# Patient Record
Sex: MALE | Race: WHITE | NOT HISPANIC OR LATINO | Employment: OTHER | URBAN - METROPOLITAN AREA
[De-identification: names, ages, dates, MRNs, and addresses within clinical notes are randomized per-mention and may not be internally consistent; named-entity substitution may affect disease eponyms.]

---

## 2017-01-21 ENCOUNTER — APPOINTMENT (OUTPATIENT)
Dept: CT IMAGING | Facility: CLINIC | Age: 57
DRG: 871 | End: 2017-01-21
Attending: EMERGENCY MEDICINE
Payer: MEDICARE

## 2017-01-21 ENCOUNTER — TRANSFERRED RECORDS (OUTPATIENT)
Dept: HEALTH INFORMATION MANAGEMENT | Facility: CLINIC | Age: 57
End: 2017-01-21

## 2017-01-21 ENCOUNTER — APPOINTMENT (OUTPATIENT)
Dept: GENERAL RADIOLOGY | Facility: CLINIC | Age: 57
DRG: 871 | End: 2017-01-21
Attending: EMERGENCY MEDICINE
Payer: MEDICARE

## 2017-01-21 ENCOUNTER — HOSPITAL ENCOUNTER (INPATIENT)
Facility: CLINIC | Age: 57
LOS: 2 days | Discharge: ANOTHER HOSPITAL | DRG: 871 | End: 2017-01-23
Attending: EMERGENCY MEDICINE | Admitting: INTERNAL MEDICINE
Payer: MEDICARE

## 2017-01-21 DIAGNOSIS — K59.00 CONSTIPATION, UNSPECIFIED CONSTIPATION TYPE: ICD-10-CM

## 2017-01-21 DIAGNOSIS — E03.9 HYPOTHYROIDISM, UNSPECIFIED TYPE: ICD-10-CM

## 2017-01-21 DIAGNOSIS — R45.1 AGITATION REQUIRING SEDATION PROTOCOL: ICD-10-CM

## 2017-01-21 DIAGNOSIS — J18.9 PNEUMONIA DUE TO INFECTIOUS ORGANISM, UNSPECIFIED LATERALITY, UNSPECIFIED PART OF LUNG: ICD-10-CM

## 2017-01-21 DIAGNOSIS — R65.21 SEPTIC SHOCK (H): ICD-10-CM

## 2017-01-21 DIAGNOSIS — K21.00 GASTROESOPHAGEAL REFLUX DISEASE WITH ESOPHAGITIS: ICD-10-CM

## 2017-01-21 DIAGNOSIS — G40.803 INTRACTABLE EPILEPSY DUE TO EXTERNAL CAUSES WITH STATUS EPILEPTICUS (H): Primary | ICD-10-CM

## 2017-01-21 DIAGNOSIS — A41.9 SEPTIC SHOCK (H): ICD-10-CM

## 2017-01-21 DIAGNOSIS — K85.90 ACUTE PANCREATITIS, UNSPECIFIED COMPLICATION STATUS, UNSPECIFIED PANCREATITIS TYPE: ICD-10-CM

## 2017-01-21 DIAGNOSIS — E23.0 PANHYPOPITUITARISM (H): ICD-10-CM

## 2017-01-21 LAB
ALBUMIN SERPL-MCNC: 2.1 G/DL (ref 3.4–5)
ALP SERPL-CCNC: 63 U/L (ref 40–150)
ALT SERPL W P-5'-P-CCNC: 23 U/L (ref 0–70)
ANION GAP SERPL CALCULATED.3IONS-SCNC: 15 MMOL/L (ref 3–14)
AST SERPL W P-5'-P-CCNC: 21 U/L (ref 0–45)
BILIRUB SERPL-MCNC: 1 MG/DL (ref 0.2–1.3)
BUN SERPL-MCNC: 24 MG/DL (ref 7–30)
CALCIUM SERPL-MCNC: 6.8 MG/DL (ref 8.5–10.1)
CHLORIDE SERPL-SCNC: 111 MMOL/L (ref 94–109)
CO2 BLDCOV-SCNC: 16 MMOL/L (ref 21–28)
CO2 SERPL-SCNC: 18 MMOL/L (ref 20–32)
CREAT SERPL-MCNC: 1.14 MG/DL (ref 0.66–1.25)
GFR SERPL CREATININE-BSD FRML MDRD: 67 ML/MIN/1.7M2
GLUCOSE BLDC GLUCOMTR-MCNC: 208 MG/DL (ref 70–99)
GLUCOSE BLDC GLUCOMTR-MCNC: 90 MG/DL (ref 70–99)
GLUCOSE SERPL-MCNC: 106 MG/DL (ref 70–99)
INTERPRETATION ECG - MUSE: NORMAL
LACTATE BLD-SCNC: 10 MMOL/L (ref 0.7–2.1)
LACTATE BLD-SCNC: 8.5 MMOL/L (ref 0.7–2.1)
LIPASE SERPL-CCNC: 789 U/L (ref 73–393)
PCO2 BLDV: 45 MM HG (ref 40–50)
PH BLDV: 7.17 PH (ref 7.32–7.43)
PLATELET # BLD AUTO: 224 10E9/L (ref 150–450)
PO2 BLDV: 28 MM HG (ref 25–47)
POTASSIUM SERPL-SCNC: 4.5 MMOL/L (ref 3.4–5.3)
PROT SERPL-MCNC: 6.1 G/DL (ref 6.8–8.8)
SAO2 % BLDV FROM PO2: 39 %
SODIUM SERPL-SCNC: 144 MMOL/L (ref 133–144)
TROPONIN I SERPL-MCNC: 0.03 UG/L (ref 0–0.04)

## 2017-01-21 PROCEDURE — 82330 ASSAY OF CALCIUM: CPT | Performed by: INTERNAL MEDICINE

## 2017-01-21 PROCEDURE — 80053 COMPREHEN METABOLIC PANEL: CPT | Performed by: EMERGENCY MEDICINE

## 2017-01-21 PROCEDURE — 74177 CT ABD & PELVIS W/CONTRAST: CPT

## 2017-01-21 PROCEDURE — 87640 STAPH A DNA AMP PROBE: CPT | Performed by: INTERNAL MEDICINE

## 2017-01-21 PROCEDURE — 87040 BLOOD CULTURE FOR BACTERIA: CPT | Performed by: EMERGENCY MEDICINE

## 2017-01-21 PROCEDURE — 82803 BLOOD GASES ANY COMBINATION: CPT

## 2017-01-21 PROCEDURE — 84484 ASSAY OF TROPONIN QUANT: CPT | Performed by: EMERGENCY MEDICINE

## 2017-01-21 PROCEDURE — 40000275 ZZH STATISTIC RCP TIME EA 10 MIN

## 2017-01-21 PROCEDURE — 36415 COLL VENOUS BLD VENIPUNCTURE: CPT

## 2017-01-21 PROCEDURE — 25500064 ZZH RX 255 OP 636: Performed by: EMERGENCY MEDICINE

## 2017-01-21 PROCEDURE — 25000132 ZZH RX MED GY IP 250 OP 250 PS 637: Mod: GY | Performed by: INTERNAL MEDICINE

## 2017-01-21 PROCEDURE — 25800025 ZZH RX 258: Performed by: INTERNAL MEDICINE

## 2017-01-21 PROCEDURE — 93010 ELECTROCARDIOGRAM REPORT: CPT | Performed by: INTERNAL MEDICINE

## 2017-01-21 PROCEDURE — 40000281 ZZH STATISTIC TRANSPORT TIME EA 15 MIN

## 2017-01-21 PROCEDURE — 3E043XZ INTRODUCTION OF VASOPRESSOR INTO CENTRAL VEIN, PERCUTANEOUS APPROACH: ICD-10-PCS | Performed by: INTERNAL MEDICINE

## 2017-01-21 PROCEDURE — A9270 NON-COVERED ITEM OR SERVICE: HCPCS | Mod: GY | Performed by: EMERGENCY MEDICINE

## 2017-01-21 PROCEDURE — 96365 THER/PROPH/DIAG IV INF INIT: CPT

## 2017-01-21 PROCEDURE — A9270 NON-COVERED ITEM OR SERVICE: HCPCS | Mod: GY | Performed by: INTERNAL MEDICINE

## 2017-01-21 PROCEDURE — 0F9G3ZX DRAINAGE OF PANCREAS, PERCUTANEOUS APPROACH, DIAGNOSTIC: ICD-10-PCS | Performed by: RADIOLOGY

## 2017-01-21 PROCEDURE — 82805 BLOOD GASES W/O2 SATURATION: CPT | Performed by: INTERNAL MEDICINE

## 2017-01-21 PROCEDURE — 25000125 ZZHC RX 250: Performed by: INTERNAL MEDICINE

## 2017-01-21 PROCEDURE — 85049 AUTOMATED PLATELET COUNT: CPT | Performed by: EMERGENCY MEDICINE

## 2017-01-21 PROCEDURE — 00000146 ZZHCL STATISTIC GLUCOSE BY METER IP

## 2017-01-21 PROCEDURE — 83605 ASSAY OF LACTIC ACID: CPT

## 2017-01-21 PROCEDURE — 93005 ELECTROCARDIOGRAM TRACING: CPT

## 2017-01-21 PROCEDURE — 5A1945Z RESPIRATORY VENTILATION, 24-96 CONSECUTIVE HOURS: ICD-10-PCS | Performed by: INTERNAL MEDICINE

## 2017-01-21 PROCEDURE — 25800025 ZZH RX 258: Performed by: EMERGENCY MEDICINE

## 2017-01-21 PROCEDURE — 71010 XR CHEST PORT 1 VW: CPT

## 2017-01-21 PROCEDURE — 96366 THER/PROPH/DIAG IV INF ADDON: CPT

## 2017-01-21 PROCEDURE — 36620 INSERTION CATHETER ARTERY: CPT | Performed by: INTERNAL MEDICINE

## 2017-01-21 PROCEDURE — 84484 ASSAY OF TROPONIN QUANT: CPT | Performed by: INTERNAL MEDICINE

## 2017-01-21 PROCEDURE — 83605 ASSAY OF LACTIC ACID: CPT | Performed by: INTERNAL MEDICINE

## 2017-01-21 PROCEDURE — 99291 CRITICAL CARE FIRST HOUR: CPT | Mod: 25 | Performed by: INTERNAL MEDICINE

## 2017-01-21 PROCEDURE — 20000003 ZZH R&B ICU

## 2017-01-21 PROCEDURE — 94002 VENT MGMT INPAT INIT DAY: CPT

## 2017-01-21 PROCEDURE — 25000132 ZZH RX MED GY IP 250 OP 250 PS 637: Mod: GY | Performed by: EMERGENCY MEDICINE

## 2017-01-21 PROCEDURE — 99285 EMERGENCY DEPT VISIT HI MDM: CPT | Mod: 25

## 2017-01-21 PROCEDURE — 87641 MR-STAPH DNA AMP PROBE: CPT | Performed by: INTERNAL MEDICINE

## 2017-01-21 PROCEDURE — 80048 BASIC METABOLIC PNL TOTAL CA: CPT | Performed by: INTERNAL MEDICINE

## 2017-01-21 PROCEDURE — 40000008 ZZH STATISTIC AIRWAY CARE

## 2017-01-21 PROCEDURE — 25000125 ZZHC RX 250: Performed by: EMERGENCY MEDICINE

## 2017-01-21 PROCEDURE — 83690 ASSAY OF LIPASE: CPT | Performed by: EMERGENCY MEDICINE

## 2017-01-21 RX ORDER — DESMOPRESSIN ACETATE 0.1 MG/1
100 TABLET ORAL DAILY
Status: DISCONTINUED | OUTPATIENT
Start: 2017-01-22 | End: 2017-01-23 | Stop reason: HOSPADM

## 2017-01-21 RX ORDER — AMINO ACIDS/PROTEIN HYDROLYS 15G-100/30
1 LIQUID (ML) ORAL 2 TIMES DAILY
Status: DISCONTINUED | OUTPATIENT
Start: 2017-01-21 | End: 2017-01-23 | Stop reason: HOSPADM

## 2017-01-21 RX ORDER — CHLORHEXIDINE GLUCONATE ORAL RINSE 1.2 MG/ML
15 SOLUTION DENTAL EVERY 12 HOURS
Status: DISCONTINUED | OUTPATIENT
Start: 2017-01-21 | End: 2017-01-23 | Stop reason: HOSPADM

## 2017-01-21 RX ORDER — IOPAMIDOL 755 MG/ML
60 INJECTION, SOLUTION INTRAVASCULAR ONCE
Status: COMPLETED | OUTPATIENT
Start: 2017-01-21 | End: 2017-01-21

## 2017-01-21 RX ORDER — BISACODYL 10 MG
10 SUPPOSITORY, RECTAL RECTAL DAILY
Status: DISCONTINUED | OUTPATIENT
Start: 2017-01-21 | End: 2017-01-23 | Stop reason: HOSPADM

## 2017-01-21 RX ORDER — DEXTROSE, SODIUM CHLORIDE, SODIUM LACTATE, POTASSIUM CHLORIDE, AND CALCIUM CHLORIDE 5; .6; .31; .03; .02 G/100ML; G/100ML; G/100ML; G/100ML; G/100ML
INJECTION, SOLUTION INTRAVENOUS CONTINUOUS
Status: DISCONTINUED | OUTPATIENT
Start: 2017-01-21 | End: 2017-01-22 | Stop reason: CLARIF

## 2017-01-21 RX ORDER — AMOXICILLIN 250 MG
1-2 CAPSULE ORAL 2 TIMES DAILY PRN
Status: DISCONTINUED | OUTPATIENT
Start: 2017-01-21 | End: 2017-01-23 | Stop reason: HOSPADM

## 2017-01-21 RX ORDER — HEPARIN SODIUM 5000 [USP'U]/.5ML
5000 INJECTION, SOLUTION INTRAVENOUS; SUBCUTANEOUS EVERY 8 HOURS
Status: DISCONTINUED | OUTPATIENT
Start: 2017-01-21 | End: 2017-01-21

## 2017-01-21 RX ORDER — POLYETHYLENE GLYCOL 3350 17 G/17G
17 POWDER, FOR SOLUTION ORAL DAILY PRN
Status: DISCONTINUED | OUTPATIENT
Start: 2017-01-21 | End: 2017-01-21 | Stop reason: DRUGHIGH

## 2017-01-21 RX ORDER — FENTANYL CITRATE 50 UG/ML
INJECTION, SOLUTION INTRAMUSCULAR; INTRAVENOUS
Status: DISPENSED
Start: 2017-01-21 | End: 2017-01-22

## 2017-01-21 RX ORDER — POLYETHYLENE GLYCOL 3350 17 G/17G
17 POWDER, FOR SOLUTION ORAL 2 TIMES DAILY PRN
Status: DISCONTINUED | OUTPATIENT
Start: 2017-01-21 | End: 2017-01-23 | Stop reason: HOSPADM

## 2017-01-21 RX ORDER — MINERAL OIL/HYDROPHIL PETROLAT
OINTMENT (GRAM) TOPICAL DAILY
Status: ON HOLD | COMMUNITY
End: 2017-01-25

## 2017-01-21 RX ORDER — TESTOSTERONE CYPIONATE 200 MG/ML
200 INJECTION, SOLUTION INTRAMUSCULAR WEEKLY
Status: DISCONTINUED | OUTPATIENT
Start: 2017-01-21 | End: 2017-01-23 | Stop reason: HOSPADM

## 2017-01-21 RX ORDER — LEVOTHYROXINE SODIUM 137 UG/1
137 TABLET ORAL
Status: DISCONTINUED | OUTPATIENT
Start: 2017-01-22 | End: 2017-01-23 | Stop reason: HOSPADM

## 2017-01-21 RX ORDER — SODIUM CHLORIDE, SODIUM LACTATE, POTASSIUM CHLORIDE, CALCIUM CHLORIDE 600; 310; 30; 20 MG/100ML; MG/100ML; MG/100ML; MG/100ML
INJECTION, SOLUTION INTRAVENOUS CONTINUOUS
Status: DISCONTINUED | OUTPATIENT
Start: 2017-01-21 | End: 2017-01-21

## 2017-01-21 RX ORDER — CHLORHEXIDINE GLUCONATE ORAL RINSE 1.2 MG/ML
15 SOLUTION DENTAL 2 TIMES DAILY
Status: DISCONTINUED | OUTPATIENT
Start: 2017-01-21 | End: 2017-01-21

## 2017-01-21 RX ORDER — MEROPENEM 500 MG/1
500 INJECTION, POWDER, FOR SOLUTION INTRAVENOUS EVERY 6 HOURS
Status: DISCONTINUED | OUTPATIENT
Start: 2017-01-21 | End: 2017-01-23 | Stop reason: HOSPADM

## 2017-01-21 RX ORDER — ACETAMINOPHEN 650 MG/1
650 SUPPOSITORY RECTAL ONCE
Status: COMPLETED | OUTPATIENT
Start: 2017-01-21 | End: 2017-01-21

## 2017-01-21 RX ORDER — HEPARIN SODIUM 5000 [USP'U]/.5ML
5000 INJECTION, SOLUTION INTRAVENOUS; SUBCUTANEOUS EVERY 8 HOURS
Status: DISCONTINUED | OUTPATIENT
Start: 2017-01-21 | End: 2017-01-22

## 2017-01-21 RX ORDER — FENTANYL CITRATE 50 UG/ML
50 INJECTION, SOLUTION INTRAMUSCULAR; INTRAVENOUS ONCE
Status: COMPLETED | OUTPATIENT
Start: 2017-01-21 | End: 2017-01-21

## 2017-01-21 RX ORDER — NALOXONE HYDROCHLORIDE 0.4 MG/ML
.1-.4 INJECTION, SOLUTION INTRAMUSCULAR; INTRAVENOUS; SUBCUTANEOUS
Status: DISCONTINUED | OUTPATIENT
Start: 2017-01-21 | End: 2017-01-23 | Stop reason: HOSPADM

## 2017-01-21 RX ORDER — CARBAMAZEPINE 100 MG/1
200 TABLET, CHEWABLE ORAL 4 TIMES DAILY
Status: DISCONTINUED | OUTPATIENT
Start: 2017-01-21 | End: 2017-01-23 | Stop reason: HOSPADM

## 2017-01-21 RX ORDER — VANCOMYCIN HYDROCHLORIDE 1 G/200ML
1000 INJECTION, SOLUTION INTRAVENOUS EVERY 12 HOURS
Status: DISCONTINUED | OUTPATIENT
Start: 2017-01-21 | End: 2017-01-23 | Stop reason: HOSPADM

## 2017-01-21 RX ORDER — POTASSIUM CHLORIDE 20MEQ/15ML
30 LIQUID (ML) ORAL 2 TIMES DAILY
Status: DISCONTINUED | OUTPATIENT
Start: 2017-01-21 | End: 2017-01-21

## 2017-01-21 RX ORDER — NALOXONE HYDROCHLORIDE 0.4 MG/ML
.1-.4 INJECTION, SOLUTION INTRAMUSCULAR; INTRAVENOUS; SUBCUTANEOUS
Status: DISCONTINUED | OUTPATIENT
Start: 2017-01-21 | End: 2017-01-22

## 2017-01-21 RX ADMIN — VANCOMYCIN HYDROCHLORIDE 1000 MG: 1 INJECTION, SOLUTION INTRAVENOUS at 23:12

## 2017-01-21 RX ADMIN — Medication 40 MG: at 20:55

## 2017-01-21 RX ADMIN — CHLORHEXIDINE GLUCONATE 15 ML: 1.2 RINSE ORAL at 20:51

## 2017-01-21 RX ADMIN — SODIUM CHLORIDE 60 ML: 9 INJECTION, SOLUTION INTRAVENOUS at 16:24

## 2017-01-21 RX ADMIN — SODIUM CHLORIDE, POTASSIUM CHLORIDE, SODIUM LACTATE AND CALCIUM CHLORIDE: 600; 310; 30; 20 INJECTION, SOLUTION INTRAVENOUS at 16:39

## 2017-01-21 RX ADMIN — SODIUM BICARBONATE 150 MEQ: 84 INJECTION, SOLUTION INTRAVENOUS at 20:21

## 2017-01-21 RX ADMIN — BISACODYL 10 MG: 10 SUPPOSITORY RECTAL at 20:50

## 2017-01-21 RX ADMIN — NOREPINEPHRINE BITARTRATE 0.06 MCG/KG/MIN: 1 INJECTION INTRAVENOUS at 15:56

## 2017-01-21 RX ADMIN — Medication 30 MEQ: at 20:52

## 2017-01-21 RX ADMIN — SODIUM BICARBONATE: 84 INJECTION, SOLUTION INTRAVENOUS at 23:54

## 2017-01-21 RX ADMIN — FENTANYL CITRATE 50 MCG: 50 INJECTION, SOLUTION INTRAMUSCULAR; INTRAVENOUS at 19:55

## 2017-01-21 RX ADMIN — SODIUM CHLORIDE, SODIUM LACTATE, POTASSIUM CHLORIDE, CALCIUM CHLORIDE AND DEXTROSE MONOHYDRATE: 5; 600; 310; 30; 20 INJECTION, SOLUTION INTRAVENOUS at 20:30

## 2017-01-21 RX ADMIN — MIDAZOLAM HYDROCHLORIDE 2 MG: 1 INJECTION, SOLUTION INTRAMUSCULAR; INTRAVENOUS at 19:55

## 2017-01-21 RX ADMIN — HYDROCORTISONE SODIUM SUCCINATE 100 MG: 100 INJECTION, POWDER, FOR SOLUTION INTRAMUSCULAR; INTRAVENOUS at 23:33

## 2017-01-21 RX ADMIN — ACETAMINOPHEN 650 MG: 160 SOLUTION ORAL at 21:16

## 2017-01-21 RX ADMIN — VASOPRESSIN 2.4 UNITS/HR: 20 INJECTION INTRAVENOUS at 19:45

## 2017-01-21 RX ADMIN — CARBAMAZEPINE 200 MG: 100 TABLET, CHEWABLE ORAL at 20:51

## 2017-01-21 RX ADMIN — MEROPENEM 500 MG: 500 INJECTION, POWDER, FOR SOLUTION INTRAVENOUS at 22:11

## 2017-01-21 RX ADMIN — IOPAMIDOL 60 ML: 755 INJECTION, SOLUTION INTRAVENOUS at 16:23

## 2017-01-21 RX ADMIN — BRIVARACETAM 50 MG: 50 INJECTION, SUSPENSION INTRAVENOUS at 21:37

## 2017-01-21 RX ADMIN — MIDAZOLAM HYDROCHLORIDE 2 MG: 1 INJECTION, SOLUTION INTRAMUSCULAR; INTRAVENOUS at 21:30

## 2017-01-21 RX ADMIN — SODIUM CHLORIDE, POTASSIUM CHLORIDE, SODIUM LACTATE AND CALCIUM CHLORIDE 1000 ML: 600; 310; 30; 20 INJECTION, SOLUTION INTRAVENOUS at 15:58

## 2017-01-21 RX ADMIN — CALCIUM CARBONATE 1250 MG: 1250 SUSPENSION ORAL at 20:50

## 2017-01-21 RX ADMIN — HEPARIN SODIUM 5000 UNITS: 10000 INJECTION, SOLUTION INTRAVENOUS; SUBCUTANEOUS at 21:17

## 2017-01-21 RX ADMIN — Medication 650 MG: at 16:01

## 2017-01-21 RX ADMIN — FENTANYL CITRATE 100 MCG/HR: 50 INJECTION, SOLUTION INTRAMUSCULAR; INTRAVENOUS at 21:44

## 2017-01-21 RX ADMIN — MIDAZOLAM HYDROCHLORIDE 2 MG: 1 INJECTION, SOLUTION INTRAMUSCULAR; INTRAVENOUS at 23:19

## 2017-01-21 ASSESSMENT — ENCOUNTER SYMPTOMS
ABDOMINAL PAIN: 0
CONSTIPATION: 0
DYSURIA: 0
FREQUENCY: 0
HEMATURIA: 0
DIARRHEA: 0
BLOOD IN STOOL: 0
DIFFICULTY URINATING: 0
SHORTNESS OF BREATH: 0
FEVER: 1
COUGH: 0

## 2017-01-21 NOTE — ED NOTES
[facility]  ED Nurse Handoff Report    ED Chief complaint: Hypotension and Fever      ED Diagnosis:   Final diagnoses:   Septic shock (H)       Code Status: Full Code    Allergies:   Allergies   Allergen Reactions     Dilantin [Phenytoin Sodium]        Activity level:  Total Care     Needed?: No    Isolation: No  Infection: Not Applicable    Bariatric?: No      Vital Signs:   Filed Vitals:    01/21/17 1534 01/21/17 1558   BP: 71/52 95/44   Temp: 100.7  F (38.2  C)    TempSrc: Rectal    Resp: 24 38   Weight: 54.432 kg (120 lb)    SpO2: 95% 94%       Cardiac Rhythm: ,   Cardiac  Cardiac Rhythm: Sinus tachycardia    Pain level: 0-10 Pain Scale: 5    Is this patient confused?: No    Patient Report: Initial Complaint: Pt transported from [facility] via EMS for fever and hypotension.  Pt has hx of seizures, TBI, V-arrest, Kidney injury. Pt BP dropped last night and pt administered 2000cc bolus NS last night.  This AM he spiked temp of 102 with  BP 90/60. Pt BP dropped as low as 60 systolic with HR in 150's  Throughout the night.  Pt was given another 2000 cc bolus and started on Levophed which he responded to.  BP before arrival to ED was 92/65. Pt also c/o abdominal pain. Septic shock suspected due to pneumonia and pt started on broad spectrum antibiotics.        Focused Assessment: Pt has trach and is on vent- pt is awake and alert but non-verbal. Pt has PICC line in left AC and 22G in right AC. Pt has feeding tube and catheter in place. Pt BP upon arrival to ED was 70's systolic and pt was started on  Levophed at 0.06. CT and chest xray pending. Vent settings: , Peep 5, Fio2 50%. Pt has G/J tube in place. G tube is hooked to drainage.   Tests Performed: Labs, EKG, CT, Xray  Abnormal Results: Lactic 8.5, VBG PH 7.17, Chest xray, CT showing possible pancreatitis   Treatments provided: LR bolus, Levophed, rectal tylenol    Family Comments: Mother here    OBS brochure/video  discussed/provided to patient: N/A    ED Medications:   Medications   lactated ringers infusion (not administered)   norepinephrine (LEVOPHED) 16 mg in D5W 250 mL infusion (0.06 mcg/kg/min × 54.4 kg Intravenous New Bag 1/21/17 9152)   iopamidol (ISOVUE-370) solution 60 mL (not administered)   sodium chloride 0.9 % for CT scan flush dose 60 mL (not administered)   acetaminophen (TYLENOL) Suppository 650 mg (650 mg Rectal Given 1/21/17 1601)   lactated ringers BOLUS 1,632 mL (1,000 mLs Intravenous New Bag 1/21/17 4896)       Drips infusing?:  Yes      ED NURSE PHONE NUMBER: STAB nurse

## 2017-01-21 NOTE — ED PROVIDER NOTES
History     Chief Complaint:  Sepsis     HPI   Keyon Farias is a 56 year old male who presents via [facility] for evaluation of symptoms concerning for sepsis. Per EMS report, the patient received 2,000 mL bolus normal saline last night before midnight. This morning he was brought to ICU at [facility] because at 3:45am the patient had a temperature of 102.0  F, hear rate of 135, and blood pressure of 90/60. The patient dropped as low as 60 systolic and had a heart rate in the 150's throughout the night. The patient was given 2,000 more of normal saline and started on Levophed via PICC line and the patient was responsive to this as his BP increased. Patient was then given a fifth liter. Last blood pressure per EMS was 92/65. Per EMS, there were complaints of abdominal pain and possibly some guarding, though here the patient denies any abdominal pain, chest pain, cough or shortness of breath. He has a GJ tube in place and they drained about 800 cc of bile fluid from it. Patient was already given Zosyn and Vancomycin just prior to arriving here in the ED. He has been producing normal, non-bloody stool and urine regularly.       [facility] Labs resulted at 10:00am today:  White cell count: 31   Hemoglobin: 11.6   Platelets: 245    Lactate: 5.8    Troponin: <0.045     Sodium: 141  Potassium: 4  Chloride: 104  CO2: 27  BUN: 26  Creatinine: 0.94  Glucose: 71  Calcium: 8.5  Anion Gap: 10  Albumin: 2.7  Total bilirubin: 2.5  Alkphos: 85  AST: 24  ALT: 35    UA: Positive for Nitrites, 1-4 white cells, bacteria present, and mucous threads     Chest Xray results from [facility]:   Slightly more prominent air space in the right midlung which may represent infiltrate. There are persistent opacities in both posterior medical bases which may represent atelectasis or infiltrate.       Mother is contact    This morning at 5:14    Allergies:  Dilantin     Medications:    valproic acid (DEPAKENE) 250  MG/5ML SYRP syrup   pantoprazole (PROTONIX) 2 mg/mL suspension   hydrocortisone sodium succinate PF (SOLU-CORTEF) 100 MG injection   hydrocortisone sodium succinate PF (SOLU-CORTEF) 100 MG injection   levothyroxine (SYNTHROID/LEVOTHROID) 137 MCG tablet   potassium & sodium phosphates (NEUTRA-PHOS) 280-160-250 MG Packet   melatonin 1 MG TABS tablet   HYDROcodone-acetaminophen (NORCO) 5-325 MG per tablet   aspirin 10 mg/mL   ipratropium - albuterol 0.5 mg/2.5 mg/3 mL (DUONEB) 0.5-2.5 (3) MG/3ML neb solution   Heparin Sodium, Porcine, (HEPARIN SODIUM PF) 5000 UNIT/0.5ML injection   miconazole (MICATIN; MICRO GUARD) 2 % powder   bisacodyl (DULCOLAX) 10 MG Suppository   polyethylene glycol (MIRALAX/GLYCOLAX) Packet   protein modular (PROSTAT SUGAR FREE)   pantoprazole (PROTONIX) 40 MG EC tablet   insulin aspart (NOVOLOG PEN) 100 UNIT/ML injection   hydrocortisone sodium succinate PF (SOLU-CORTEF) 100 MG injection   valproic acid (DEPAKENE) 250 MG/5ML SYRP syrup   carBAMazepine (TEGRETOL) 100 MG chewable tablet   Potassium Chloride 40 MEQ/15ML (20%) SOLN   ACETAMINOPHEN PO   calcium carbonate (TUMS) 500 MG chewable tablet   desmopressin (DDAVP) 4 MCG/ML injection   testosterone cypionate (DEPOTESTOTERONE CYPIONATE) 200 MG/ML injection   chlorhexidine (PERIDEX) 0.12 % solution   levothyroxine (SYNTHROID, LEVOTHROID) 137 MCG tablet     Past Medical History:    TBI  GERD  Seizures  Thyroid disease  Panhypopituitarism  Ventricular tachyarrhythmia  Ventricular fibrillation  Septic shock  Pneumonia  DVT  Tracheostomy  Spastic hemiplegia affecting dominant side  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   Cardiac arrest  Acute respiratory with hypoxia    Past Surgical History:    Past Surgical History   Procedure Laterality Date     Orthopedic surgery       right hand repair     Head & neck surgery       reconstructive facial surgery following accident in 1989     Vascular surgery       Laparoscopic appendectomy  7/30/2013     Procedure:  LAPAROSCOPIC APPENDECTOMY;  LAPAROSCOPIC APPENDECTOMY;  Surgeon: Manish Pierce MD;  Location:  OR     Esophagoscopy, gastroscopy, duodenoscopy (egd), combined  3/13/2014     Procedure: COMBINED ESOPHAGOSCOPY, GASTROSCOPY, DUODENOSCOPY (EGD), BIOPSY SINGLE OR MULTIPLE;  gastroscopy;  Surgeon: Digna Rhodes MD;  Location:  GI     Tracheostomy N/A 9/3/2016     Procedure: TRACHEOSTOMY;  Surgeon: João Ortiz MD;  Location:  OR     Laparoscopic assisted insertion tube gastrotomy N/A 9/7/2016     Procedure: LAPAROSCOPIC ASSISTED INSERTION TUBE GASTROSTOMY;  Surgeon: Manish Pierce MD;  Location:  OR     Tracheostomy N/A 12/2/2016     Procedure: TRACHEOSTOMY;  Surgeon: João Ortiz MD;  Location:  OR     Esophagoscopy, gastroscopy, duodenoscopy (egd), combined N/A 12/6/2016     Procedure: COMBINED ESOPHAGOSCOPY, GASTROSCOPY, DUODENOSCOPY (EGD);  Surgeon: Digna Rhodes MD;  Location:  GI       Family History:    History reviewed. No past pertinent family history.    Social History:  Tobacco Use: Former smoker   Alcohol Use: No   Marital status: Single   Presents alone via EMS  PCP:  Julee Roberson      Review of Systems   Constitutional: Positive for fever.   Respiratory: Negative for cough and shortness of breath.    Cardiovascular: Negative for chest pain.   Gastrointestinal: Negative for abdominal pain, diarrhea, constipation and blood in stool.   Genitourinary: Negative for dysuria, urgency, frequency, hematuria, decreased urine volume and difficulty urinating.   All other systems reviewed and are negative.    Physical Exam     Patient Vitals for the past 24 hrs:   BP Temp Temp src Heart Rate Resp SpO2 Weight   01/21/17 1816 93/59 mmHg - - 144 29 93 % -   01/21/17 1800 105/71 mmHg - - 144 22 99 % -   01/21/17 1745 (!) 87/58 mmHg - - 135 26 - -   01/21/17 1740 93/48 mmHg - - 138 30 - -   01/21/17 1730 92/70 mmHg - - 134 24 - -   01/21/17 1725 94/69  mmHg - - - - - -   01/21/17 1720 90/62 mmHg - - 135 (!) 33 97 % -   01/21/17 1715 91/62 mmHg - - 135 20 97 % -   01/21/17 1710 (!) 87/62 mmHg - - 135 - 98 % -   01/21/17 1705 93/73 mmHg - - 130 - (!) 84 % -   01/21/17 1700 105/61 mmHg - - 133 (!) 31 98 % -   01/21/17 1655 (!) 83/66 mmHg - - 135 - 94 % -   01/21/17 1645 (!) 89/60 mmHg - - 131 24 97 % -   01/21/17 1628 90/46 mmHg - - - - - -   01/21/17 1558 95/44 mmHg - - 147 (!) 38 94 % -   01/21/17 1534 (!) 71/52 mmHg 100.7  F (38.2  C) Rectal 143 24 95 % 54.432 kg (120 lb)        Physical Exam  General:  Laying on gurney. Cachetic and chronically debilitated-appearing.    HENT:  No obvious trauma to head. Tracheostomy in place and pt on vent.  Right Ear:  External ear normal.   Left Ear:  External ear normal.   Nose:  Nose normal.   Eyes:  Conjunctivae and EOM are normal. Pupils are equal, round, and reactive.   Neck: Normal range of motion. Neck supple. No tracheal deviation present.   CV:  Normal heart sounds. No murmur heard.  Pulm/Chest: Effort normal and breath sounds normal.   Abd: Soft. No distension. There is no tenderness. There is no rigidity, no rebound and no guarding. GJ tube in place and no surrounding erythema or drainage from site.  M/S: Normal range of motion.   Neuro: Alert., shakes head yes and know to questions.  Skin: Skin is warm and dry. No rash noted. Not diaphoretic.   Psych: Normal mood and affect. Behavior is normal.     Emergency Department Course   ECG:  Indication: tachycardia.  Rate 145 bpm. UT interval 124. QRS duration 80. QT/QTc 276/428. P-R-T axes 61 -66 78. Sinus tachycardia. Left axis deviation. Abnormal ECG. Agree with interpretation. Performed at 1601. Read by Tyler Epperson DO, at 1602.     Imaging:  Radiographic findings were communicated with the patient, family and Admitting MD who voiced understanding of the findings.   Chest XR, 1 view PORTABLE:  Patchy bibasilar opacities persist and may represent multifocal  pneumonia and/or atelectasis. Endotracheal tube in appropriate position. Left PICC line tip ends in the region of the right atrium. Consider retraction of this line by approximately 3.8 cm for placement in the region of the cavoatrial junction. Preliminary reading per Radiology. Final report to follow.    CT Abdomen Pelvis w/ contrast:   1. Development of prominent complex fluid collections replacing much of the pancreatic body and tail, along with a new fluid collection at the pancreatic head. This is consistent with pancreatitis and acute  peripancreatic necrotic fluid collections. There is some residual normal enhancing pancreatic parenchyma at the neck, regions of the head, and uncinate process.  2. Bilateral lower lobe patchy consolidation worrisome for multifocal pneumonia.  3. Ascites in the abdomen and pelvis is of small volume and is stable versus just slightly smaller.  4. Other fluid collections noted adjacent to the stomach related to pancreatitis.   Preliminary reading per Radiology. Final report to follow.    Laboratory:  BMP: Chloride: 111 (high), Carbon Dioxide: 18 (low), Anion Gap: 15 (high), Glucose: 106 (high), Calcium: 6.8 (low), Creatinine: 1.14 (WNL), O/W WNL.      ISTAT gases lactate venous POCT @1610: Ph Venous: 7.17 (very low), PCO2 Venous: 45, PO2 Venous: 28, Bicarbonate Venous: 16 (low), O2 Sat Venous: 39, Lactic Acid: 8.5 (very high)    Troponin I @1600: 0.028    Lipase: 789 (high)    Blood Culture: Pending  Blood Culture: Pending    Interventions:   1556 Levophed 0.06 mcg/kg/min  1558 Sodium chloride 0.9% 1,000 mL IV  1601 Tylenol 650 mg Rectal  1637  Levophed 0.007 mcg/kg/min  1639 Lactated ringers 150 mL/hr IV  1642 Levophed 0.009 mcg/kg/min    Emergency Department Course:   The patient was placed on pulse oximetry  The patient was placed on continuous cardiac monitoring  ECG was obtained upon patient's arrival.   IV access was established. Blood drawn for laboratory testing  1188  Pharmacist consulted  1622 Recheck. I spoke with the patient's mother and informed her of the critical lactate and increased risk for mortality.  1639 Recheck. I reviewed the results of the patient's chest Xray to the patient and mother  1652 I discussed the case with Dr. Green of ICU.  Findings and plan explained to the Patient and mother who consents to admission. Discussed the patient with Dr. Green, who will admit the patient to a adult ICU bed for further monitoring, evaluation, and treatment.     SEVERE SEPSIS and SEPTIC SHOCK EARLY MANAGEMENT BUNDLE    SEVERE SEPSIS:     Keyon Farias meets criteria for severe sepsis as evidenced by:     1. 2 SIRS criteria, AND    2. Suspected infection, AND     3.  ACUTE Organ dysfunction:  SBP <90 or MAP < 65, Lactic Acid >2, Cr >2, Plt count   < 100k, Bilirubin > 2, INR >1.5     Time severe sepsis present = on presentation per report    SEPTIC SHOCK:     Keyon Farias does meet criteria for septic shock (hypotension despite adequate fluid   administration or Lactic Acid >4).       Time septic shock present = on arrival based upon outside lactate, but lactate drawn at   1610 and resulted at 1639 found to be 8.5      3 Hour Bundle Completion (Severe Sepsis and Septic Shock):  1. Initial Lactic Acid Result: 5.8 at outside facility, here it is 8.5  2. Blood Cultures before Antibiotics: unknown, pt just received zosyn and vancomycin at  outside facility just PTA, but 2 cultures were obtained upon arriving in ED  3. Broad Spectrum Antibiotics Administered: Yes, Zosyn just given at outside facility.  4. Administer 30 mL/kg for hypotension or Lactate > 4 mmol/L: YES, at outside facility and  here a second 30 ml/kg fluid bolus was provided.      Impression & Plan    Medical Decision Making:  Keyon Farias is a 56 year old male who presents from [facility] where he is chronically trached and vented. The patient became hypotensive yesterday. He received two liters of IV fluids last  night, another two this morning, and a fifth liter of fluids was started just prior to transfer. The patient was also initiated on Levophed through his PICC line. The patient had a 31,000 white count. Chest Xray showed questionable infiltrate, so he was started on Zosyn and Vancomycin and had already received these just piror to arrival, so these do not need to be repeated. I cannot see if blood cultures were obtained, therefore two blood cultures were obtained immediately upon presentation. Screening ECG shows a sinus tachycardia. The patient had a negative troponin at the facility, and a potassium was normal, but the screening ECG also had some peaked T waves, so a repeat BMP and troponin were added-on and normal. The patient currently denies any pain. [facility] was concerned about possible abdominal pain. They drained 800 cc from the GJ tube. Here the patient has a benign non-surgical abdomen and does not indicate pain. Partly due to concern from outside facility and intensivist recommendation, I ordered a CT. This was performed a reveals evidence of what is likely pancreatitis. Lipase was then checked and found to be elevated. A chest Xray was repeated because the patient is more hydrated compared to the image obtained at [facility] earlier this morning. The chest Xray reveals patchy bibasilar opacities persist and may represent multifocal pneumonia and/or atelectasis. The patient is maintained on the Levophed. He is acutely sick. The patient will be sent to the ICU for continued evaluation and treatment. The lactate was repeated when he presented and after the outside facilities 5,000 ml of fluids and abx, the pts lactate increased to 8.5. The pts interventions here were then started and this can be recheck in the ICU. The patient is already vented and is doing well on the vent. He already has central access from his PICC line which is infusing well. EKG confirmed that this is a sinus rhythm and no SVT or a  fib RVR. A urinalysis has already been performed this morning that shows no evidence of UTI. The patient has no headache to suggest meningitis. I spoke to the intensivist, Dr. Green, who has agreed to admit the patient to the ICU for continued evaluation and treatment. I reviewed this with the patient and mother, and they both agreed. I discussed that with a lactate >4 patients have a 50% chance of mortality.      >>>Critical Care time:  Total critical care time exclusive of procedures was 40 minutes for this patient.<<<      Diagnosis:  Visit Diagnosis, Associated Orders, and Comments     ICD-10-CM    1. Septic shock (H) A41.9 Blood culture   2. Pneumonia due to infectious organism, unspecified laterality, unspecified part of lung J18.9    3. Acute pancreatitis, unspecified complication status, unspecified pancreatitis type K85.90        Disposition:  Admitted to the ICU as noted above.      I, Mark Renee, am serving as a Scribe on 1/21/2017 at 3:30 PM to personally document the services performed by Tyler Epperson DO, based upon my observations and the provider's statements to me.    1/21/2017    EMERGENCY DEPARTMENT        Tyler Epperson DO  01/21/17 1929

## 2017-01-21 NOTE — ED NOTES
Bed: [facility]  Expected date: 1/21/17  Expected time: 3:13 PM  Means of arrival: Ambulance  Comments:  [address] Sepsis  Vent pt/  66 male

## 2017-01-22 ENCOUNTER — APPOINTMENT (OUTPATIENT)
Dept: CT IMAGING | Facility: CLINIC | Age: 57
DRG: 871 | End: 2017-01-22
Attending: SURGERY
Payer: MEDICARE

## 2017-01-22 LAB
ANION GAP SERPL CALCULATED.3IONS-SCNC: 15 MMOL/L (ref 3–14)
APTT PPP: 52 SEC (ref 22–37)
BASE DEFICIT BLDA-SCNC: 2 MMOL/L
BASE DEFICIT BLDA-SCNC: 4.7 MMOL/L
BLD PROD TYP BPU: NORMAL
BLD UNIT ID BPU: 0
BLD UNIT ID BPU: 0
BLOOD PRODUCT CODE: NORMAL
BLOOD PRODUCT CODE: NORMAL
BPU ID: NORMAL
BPU ID: NORMAL
BUN SERPL-MCNC: 22 MG/DL (ref 7–30)
CA-I BLD-MCNC: 4.5 MG/DL (ref 4.4–5.2)
CA-I SERPL ISE-MCNC: 3.7 MG/DL (ref 4.4–5.2)
CALCIUM SERPL-MCNC: 6.1 MG/DL (ref 8.5–10.1)
CHLORIDE SERPL-SCNC: 109 MMOL/L (ref 94–109)
CO2 SERPL-SCNC: 20 MMOL/L (ref 20–32)
CREAT SERPL-MCNC: 0.91 MG/DL (ref 0.66–1.25)
CREAT SERPL-MCNC: 1.04 MG/DL (ref 0.66–1.25)
ERYTHROCYTE [DISTWIDTH] IN BLOOD BY AUTOMATED COUNT: 19.8 % (ref 10–15)
FIBRINOGEN PPP-MCNC: 456 MG/DL (ref 200–420)
GFR SERPL CREATININE-BSD FRML MDRD: 74 ML/MIN/1.7M2
GFR SERPL CREATININE-BSD FRML MDRD: 87 ML/MIN/1.7M2
GLUCOSE BLDC GLUCOMTR-MCNC: 108 MG/DL (ref 70–99)
GLUCOSE BLDC GLUCOMTR-MCNC: 111 MG/DL (ref 70–99)
GLUCOSE BLDC GLUCOMTR-MCNC: 116 MG/DL (ref 70–99)
GLUCOSE BLDC GLUCOMTR-MCNC: 117 MG/DL (ref 70–99)
GLUCOSE BLDC GLUCOMTR-MCNC: 119 MG/DL (ref 70–99)
GLUCOSE BLDC GLUCOMTR-MCNC: 130 MG/DL (ref 70–99)
GLUCOSE BLDC GLUCOMTR-MCNC: 130 MG/DL (ref 70–99)
GLUCOSE BLDC GLUCOMTR-MCNC: 135 MG/DL (ref 70–99)
GLUCOSE BLDC GLUCOMTR-MCNC: 135 MG/DL (ref 70–99)
GLUCOSE BLDC GLUCOMTR-MCNC: 150 MG/DL (ref 70–99)
GLUCOSE BLDC GLUCOMTR-MCNC: 151 MG/DL (ref 70–99)
GLUCOSE BLDC GLUCOMTR-MCNC: 166 MG/DL (ref 70–99)
GLUCOSE BLDC GLUCOMTR-MCNC: 176 MG/DL (ref 70–99)
GLUCOSE BLDC GLUCOMTR-MCNC: 185 MG/DL (ref 70–99)
GLUCOSE BLDC GLUCOMTR-MCNC: 191 MG/DL (ref 70–99)
GLUCOSE SERPL-MCNC: 202 MG/DL (ref 70–99)
GRAM STN SPEC: ABNORMAL
GRAM STN SPEC: NORMAL
HCO3 BLD-SCNC: 19 MMOL/L (ref 21–28)
HCO3 BLD-SCNC: 20 MMOL/L (ref 21–28)
HCT VFR BLD AUTO: 30.1 % (ref 40–53)
HGB BLD-MCNC: 8.9 G/DL (ref 13.3–17.7)
INR PPP: 2.48 (ref 0.86–1.14)
INR PPP: 2.58 (ref 0.86–1.14)
LACTATE BLD-SCNC: 4.2 MMOL/L (ref 0.7–2.1)
LACTATE BLD-SCNC: 7.1 MMOL/L (ref 0.7–2.1)
LACTATE BLD-SCNC: 9.4 MMOL/L (ref 0.7–2.1)
LIPASE SERPL-CCNC: 291 U/L (ref 73–393)
MAGNESIUM SERPL-MCNC: 1.6 MG/DL (ref 1.6–2.3)
MCH RBC QN AUTO: 23.5 PG (ref 26.5–33)
MCHC RBC AUTO-ENTMCNC: 29.6 G/DL (ref 31.5–36.5)
MCV RBC AUTO: 80 FL (ref 78–100)
MICRO REPORT STATUS: ABNORMAL
MICRO REPORT STATUS: NORMAL
MRSA DNA SPEC QL NAA+PROBE: NORMAL
NUM BPU REQUESTED: 2
OXYHGB MFR BLD: 98 % (ref 92–100)
OXYHGB MFR BLD: 99 % (ref 92–100)
PCO2 BLD: 22 MM HG (ref 35–45)
PCO2 BLD: 26 MM HG (ref 35–45)
PH BLD: 7.47 PH (ref 7.35–7.45)
PH BLD: 7.56 PH (ref 7.35–7.45)
PHOSPHATE SERPL-MCNC: 1.9 MG/DL (ref 2.5–4.5)
PLATELET # BLD AUTO: 153 10E9/L (ref 150–450)
PLATELET # BLD AUTO: 196 10E9/L (ref 150–450)
PO2 BLD: 117 MM HG (ref 80–105)
PO2 BLD: 133 MM HG (ref 80–105)
POTASSIUM SERPL-SCNC: 4.5 MMOL/L (ref 3.4–5.3)
RBC # BLD AUTO: 3.78 10E12/L (ref 4.4–5.9)
SODIUM SERPL-SCNC: 144 MMOL/L (ref 133–144)
SPECIMEN SOURCE: ABNORMAL
SPECIMEN SOURCE: NORMAL
SPECIMEN SOURCE: NORMAL
TRANSFUSION STATUS PATIENT QL: NORMAL
TROPONIN I SERPL-MCNC: 0.02 UG/L (ref 0–0.04)
TROPONIN I SERPL-MCNC: 0.03 UG/L (ref 0–0.04)
WBC # BLD AUTO: 24.1 10E9/L (ref 4–11)

## 2017-01-22 PROCEDURE — 40000344 ZZHCL STATISTIC THAWING COMPONENT: Performed by: SURGERY

## 2017-01-22 PROCEDURE — 87186 SC STD MICRODIL/AGAR DIL: CPT | Performed by: INTERNAL MEDICINE

## 2017-01-22 PROCEDURE — P9059 PLASMA, FRZ BETWEEN 8-24HOUR: HCPCS | Performed by: SURGERY

## 2017-01-22 PROCEDURE — 84100 ASSAY OF PHOSPHORUS: CPT | Performed by: INTERNAL MEDICINE

## 2017-01-22 PROCEDURE — 86901 BLOOD TYPING SEROLOGIC RH(D): CPT | Performed by: SURGERY

## 2017-01-22 PROCEDURE — 82565 ASSAY OF CREATININE: CPT | Performed by: INTERNAL MEDICINE

## 2017-01-22 PROCEDURE — 83735 ASSAY OF MAGNESIUM: CPT | Performed by: INTERNAL MEDICINE

## 2017-01-22 PROCEDURE — 86850 RBC ANTIBODY SCREEN: CPT | Performed by: SURGERY

## 2017-01-22 PROCEDURE — 25000132 ZZH RX MED GY IP 250 OP 250 PS 637: Mod: GY | Performed by: INTERNAL MEDICINE

## 2017-01-22 PROCEDURE — 40000257 ZZH STATISTIC CONSULT NO CHARGE VASC ACCESS

## 2017-01-22 PROCEDURE — 85610 PROTHROMBIN TIME: CPT | Performed by: INTERNAL MEDICINE

## 2017-01-22 PROCEDURE — 83605 ASSAY OF LACTIC ACID: CPT | Performed by: INTERNAL MEDICINE

## 2017-01-22 PROCEDURE — 25000125 ZZHC RX 250: Performed by: INTERNAL MEDICINE

## 2017-01-22 PROCEDURE — 40000275 ZZH STATISTIC RCP TIME EA 10 MIN

## 2017-01-22 PROCEDURE — 82330 ASSAY OF CALCIUM: CPT | Performed by: INTERNAL MEDICINE

## 2017-01-22 PROCEDURE — 85384 FIBRINOGEN ACTIVITY: CPT | Performed by: ANESTHESIOLOGY

## 2017-01-22 PROCEDURE — 40000008 ZZH STATISTIC AIRWAY CARE

## 2017-01-22 PROCEDURE — 85610 PROTHROMBIN TIME: CPT | Performed by: ANESTHESIOLOGY

## 2017-01-22 PROCEDURE — 20000003 ZZH R&B ICU

## 2017-01-22 PROCEDURE — 86900 BLOOD TYPING SEROLOGIC ABO: CPT | Performed by: SURGERY

## 2017-01-22 PROCEDURE — 94003 VENT MGMT INPAT SUBQ DAY: CPT

## 2017-01-22 PROCEDURE — 85049 AUTOMATED PLATELET COUNT: CPT | Performed by: ANESTHESIOLOGY

## 2017-01-22 PROCEDURE — 87040 BLOOD CULTURE FOR BACTERIA: CPT | Performed by: INTERNAL MEDICINE

## 2017-01-22 PROCEDURE — 84484 ASSAY OF TROPONIN QUANT: CPT | Performed by: INTERNAL MEDICINE

## 2017-01-22 PROCEDURE — 94640 AIRWAY INHALATION TREATMENT: CPT | Mod: 76

## 2017-01-22 PROCEDURE — 40000239 ZZH STATISTIC VAT ROUNDS

## 2017-01-22 PROCEDURE — 25000128 H RX IP 250 OP 636: Performed by: INTERNAL MEDICINE

## 2017-01-22 PROCEDURE — 87075 CULTR BACTERIA EXCEPT BLOOD: CPT | Performed by: INTERNAL MEDICINE

## 2017-01-22 PROCEDURE — 87070 CULTURE OTHR SPECIMN AEROBIC: CPT | Performed by: INTERNAL MEDICINE

## 2017-01-22 PROCEDURE — 82805 BLOOD GASES W/O2 SATURATION: CPT | Performed by: INTERNAL MEDICINE

## 2017-01-22 PROCEDURE — 83690 ASSAY OF LIPASE: CPT | Performed by: INTERNAL MEDICINE

## 2017-01-22 PROCEDURE — 86923 COMPATIBILITY TEST ELECTRIC: CPT | Performed by: SURGERY

## 2017-01-22 PROCEDURE — 87077 CULTURE AEROBIC IDENTIFY: CPT | Performed by: INTERNAL MEDICINE

## 2017-01-22 PROCEDURE — A9270 NON-COVERED ITEM OR SERVICE: HCPCS | Mod: GY | Performed by: INTERNAL MEDICINE

## 2017-01-22 PROCEDURE — 99222 1ST HOSP IP/OBS MODERATE 55: CPT | Performed by: SURGERY

## 2017-01-22 PROCEDURE — 40000281 ZZH STATISTIC TRANSPORT TIME EA 15 MIN

## 2017-01-22 PROCEDURE — 85027 COMPLETE CBC AUTOMATED: CPT | Performed by: INTERNAL MEDICINE

## 2017-01-22 PROCEDURE — 87205 SMEAR GRAM STAIN: CPT | Performed by: INTERNAL MEDICINE

## 2017-01-22 PROCEDURE — 85730 THROMBOPLASTIN TIME PARTIAL: CPT | Performed by: ANESTHESIOLOGY

## 2017-01-22 PROCEDURE — 49406 IMAGE CATH FLUID PERI/RETRO: CPT

## 2017-01-22 PROCEDURE — 00000146 ZZHCL STATISTIC GLUCOSE BY METER IP

## 2017-01-22 RX ORDER — HEPARIN SODIUM,PORCINE 10 UNIT/ML
2-5 VIAL (ML) INTRAVENOUS
Status: DISCONTINUED | OUTPATIENT
Start: 2017-01-22 | End: 2017-01-22 | Stop reason: CLARIF

## 2017-01-22 RX ORDER — SODIUM CHLORIDE 9 MG/ML
INJECTION, SOLUTION INTRAVENOUS CONTINUOUS
Status: DISCONTINUED | OUTPATIENT
Start: 2017-01-22 | End: 2017-01-23 | Stop reason: HOSPADM

## 2017-01-22 RX ORDER — LIDOCAINE HYDROCHLORIDE 10 MG/ML
17 INJECTION, SOLUTION EPIDURAL; INFILTRATION; INTRACAUDAL; PERINEURAL ONCE
Status: COMPLETED | OUTPATIENT
Start: 2017-01-22 | End: 2017-01-22

## 2017-01-22 RX ORDER — DEXTROSE MONOHYDRATE 25 G/50ML
25-50 INJECTION, SOLUTION INTRAVENOUS
Status: DISCONTINUED | OUTPATIENT
Start: 2017-01-22 | End: 2017-01-23 | Stop reason: HOSPADM

## 2017-01-22 RX ORDER — LIDOCAINE 40 MG/G
CREAM TOPICAL
Status: DISCONTINUED | OUTPATIENT
Start: 2017-01-22 | End: 2017-01-22 | Stop reason: CLARIF

## 2017-01-22 RX ORDER — NICOTINE POLACRILEX 4 MG
15-30 LOZENGE BUCCAL
Status: DISCONTINUED | OUTPATIENT
Start: 2017-01-22 | End: 2017-01-23 | Stop reason: HOSPADM

## 2017-01-22 RX ORDER — MAGNESIUM SULFATE HEPTAHYDRATE 40 MG/ML
4 INJECTION, SOLUTION INTRAVENOUS EVERY 4 HOURS PRN
Status: DISCONTINUED | OUTPATIENT
Start: 2017-01-22 | End: 2017-01-23

## 2017-01-22 RX ADMIN — HYDROCORTISONE SODIUM SUCCINATE 100 MG: 100 INJECTION, POWDER, FOR SOLUTION INTRAMUSCULAR; INTRAVENOUS at 07:42

## 2017-01-22 RX ADMIN — Medication 1 PACKET: at 21:40

## 2017-01-22 RX ADMIN — Medication 81 MG: at 09:05

## 2017-01-22 RX ADMIN — CALCIUM CHLORIDE 1 G: 100 INJECTION INTRAVENOUS; INTRAVENTRICULAR at 00:30

## 2017-01-22 RX ADMIN — CARBAMAZEPINE 200 MG: 100 TABLET, CHEWABLE ORAL at 06:08

## 2017-01-22 RX ADMIN — LEVOTHYROXINE SODIUM 137 MCG: 137 TABLET ORAL at 07:41

## 2017-01-22 RX ADMIN — DESMOPRESSIN ACETATE 100 MCG: 0.1 TABLET ORAL at 08:36

## 2017-01-22 RX ADMIN — Medication 0.2 MCG/KG/MIN: at 06:34

## 2017-01-22 RX ADMIN — CARBAMAZEPINE 200 MG: 100 TABLET, CHEWABLE ORAL at 19:03

## 2017-01-22 RX ADMIN — CHLORHEXIDINE GLUCONATE 15 ML: 1.2 RINSE ORAL at 07:42

## 2017-01-22 RX ADMIN — BISACODYL 10 MG: 10 SUPPOSITORY RECTAL at 08:36

## 2017-01-22 RX ADMIN — CALCIUM CARBONATE 1250 MG: 1250 SUSPENSION ORAL at 08:36

## 2017-01-22 RX ADMIN — Medication 40 MG: at 20:55

## 2017-01-22 RX ADMIN — SODIUM CHLORIDE: 9 INJECTION, SOLUTION INTRAVENOUS at 02:49

## 2017-01-22 RX ADMIN — MEROPENEM 500 MG: 500 INJECTION, POWDER, FOR SOLUTION INTRAVENOUS at 15:58

## 2017-01-22 RX ADMIN — FENTANYL CITRATE 100 MCG/HR: 50 INJECTION, SOLUTION INTRAMUSCULAR; INTRAVENOUS at 23:46

## 2017-01-22 RX ADMIN — CHLORHEXIDINE GLUCONATE 15 ML: 1.2 RINSE ORAL at 19:19

## 2017-01-22 RX ADMIN — HEPARIN SODIUM 5000 UNITS: 10000 INJECTION, SOLUTION INTRAVENOUS; SUBCUTANEOUS at 06:08

## 2017-01-22 RX ADMIN — CALCIUM CARBONATE 1250 MG: 1250 SUSPENSION ORAL at 11:45

## 2017-01-22 RX ADMIN — FENTANYL CITRATE 100 MCG/HR: 50 INJECTION, SOLUTION INTRAMUSCULAR; INTRAVENOUS at 10:18

## 2017-01-22 RX ADMIN — BRIVARACETAM 50 MG: 50 INJECTION, SUSPENSION INTRAVENOUS at 09:06

## 2017-01-22 RX ADMIN — SODIUM CHLORIDE: 9 INJECTION, SOLUTION INTRAVENOUS at 23:47

## 2017-01-22 RX ADMIN — MEROPENEM 500 MG: 500 INJECTION, POWDER, FOR SOLUTION INTRAVENOUS at 10:39

## 2017-01-22 RX ADMIN — VANCOMYCIN HYDROCHLORIDE 1000 MG: 1 INJECTION, SOLUTION INTRAVENOUS at 11:44

## 2017-01-22 RX ADMIN — HYDROCORTISONE SODIUM SUCCINATE 100 MG: 100 INJECTION, POWDER, FOR SOLUTION INTRAMUSCULAR; INTRAVENOUS at 23:31

## 2017-01-22 RX ADMIN — MEROPENEM 500 MG: 500 INJECTION, POWDER, FOR SOLUTION INTRAVENOUS at 21:46

## 2017-01-22 RX ADMIN — SODIUM PHOSPHATE, MONOBASIC, MONOHYDRATE 20 MMOL: 276; 142 INJECTION, SOLUTION INTRAVENOUS at 07:43

## 2017-01-22 RX ADMIN — CALCIUM CARBONATE 1250 MG: 1250 SUSPENSION ORAL at 18:02

## 2017-01-22 RX ADMIN — Medication 40 MG: at 08:36

## 2017-01-22 RX ADMIN — CARBAMAZEPINE 200 MG: 100 TABLET, CHEWABLE ORAL at 15:58

## 2017-01-22 RX ADMIN — MEROPENEM 500 MG: 500 INJECTION, POWDER, FOR SOLUTION INTRAVENOUS at 04:50

## 2017-01-22 RX ADMIN — LIDOCAINE HYDROCHLORIDE 17 ML: 10 INJECTION, SOLUTION EPIDURAL; INFILTRATION; INTRACAUDAL; PERINEURAL at 15:12

## 2017-01-22 RX ADMIN — VANCOMYCIN HYDROCHLORIDE 1000 MG: 1 INJECTION, SOLUTION INTRAVENOUS at 23:31

## 2017-01-22 RX ADMIN — Medication 1 PACKET: at 08:37

## 2017-01-22 RX ADMIN — BRIVARACETAM 50 MG: 50 INJECTION, SUSPENSION INTRAVENOUS at 21:14

## 2017-01-22 RX ADMIN — CARBAMAZEPINE 200 MG: 100 TABLET, CHEWABLE ORAL at 11:44

## 2017-01-22 RX ADMIN — VASOPRESSIN 2.4 UNITS/HR: 20 INJECTION INTRAVENOUS at 06:06

## 2017-01-22 RX ADMIN — HYDROCORTISONE SODIUM SUCCINATE 100 MG: 100 INJECTION, POWDER, FOR SOLUTION INTRAMUSCULAR; INTRAVENOUS at 15:57

## 2017-01-22 RX ADMIN — SODIUM CHLORIDE 2 UNITS/HR: 9 INJECTION, SOLUTION INTRAVENOUS at 00:22

## 2017-01-22 NOTE — PLAN OF CARE
Problem: Sepsis (Adult)  Goal: Signs and Symptoms of Listed Potential Problems Will be Absent or Manageable (Sepsis)  Signs and symptoms of listed potential problems will be absent or manageable by discharge/transition of care (reference Sepsis (Adult) CPG).   Outcome: Improving  Pt remains lightly sedated on fent gtt. Pt awakens and follows, denies pain. Abdomin much less distended today, palpation much less painful per pt. Afebrile SR-ST. Levo gtt weaned down to 0.05 from 0.25, vaso gtt remains at 2.4. Abdominal site intact post CT guided needle aspiration.LA decreased as well to 4.2 from 7.5. Insulin gtt back on at 0.5 units an hour. Plan continues to be to transfer pt to  of . Pt's mother has been updated though out the day. continue to monitor. csccrn

## 2017-01-22 NOTE — PLAN OF CARE
Problem: Sepsis (Adult)  Goal: Signs and Symptoms of Listed Potential Problems Will be Absent or Manageable (Sepsis)  Signs and symptoms of listed potential problems will be absent or manageable by discharge/transition of care (reference Sepsis (Adult) CPG).  Outcome: No Change  Afebrile, ST = 104's. Levo gtt @ 0.1, titrate to effect. csccrn

## 2017-01-22 NOTE — PROGRESS NOTES
[facility]  CRITICAL CARE PROGRESS NOTE    Keyon Farias MRN: [id number]  1960  Date of Admission:1/21/2017          Problem List:   1. Septic shock, lactic acidosis   2. Pancreatitis with necrotic/fluid filled cyst  3. Acute respiratory failure      24-Hour Goals   1. Cont supportive care  2. Plan transfer to [facility] when bed is available      Overnight Events   No acute issues overnight. Pressor weaning.        Lines/Tubes/Draines/Devices   CVC: Location PICC P  IBP: radial    Tracheostomy  GJ tube  Lerma: yes      ICU Prophylaxis:   1. DVT: Hep Subq  2. VAP: HOB 30 degrees, chlorhexidine rinse  3. Stress Ulcer: PPI  4. Restraints: Nonviolent soft two point restraints required and necessary for patient safety and continued cares and good effect as patient continues to pull at necessary lines, tubes despite education and distraction. Will readdress daily.    5. IV Access - central access required and necessary for continued patient cares  6. Feeding - NPO      Medications:       levothyroxine  137 mcg Per Feeding Tube QAM AC     testosterone cypionate  200 mg Intramuscular Weekly     carBAMazepine  200 mg Per G Tube 4x Daily     aspirin  81 mg Oral Daily     bisacodyl  10 mg Rectal Daily     protein modular  1 packet Per Feeding Tube BID     pantoprazole  40 mg Per Feeding Tube BID     calcium carbonate  1,250 mg Oral TID w/meals     desmopressin (DDAVP) tablet 100 mcg  100 mcg Oral Daily     heparin  5,000 Units Subcutaneous Q8H     chlorhexidine  15 mL Mouth/Throat Q12H     fentaNYL Citrate (PF)         midazolam         Brivaracetam  50 mg Intravenous BID     meropenem  500 mg Intravenous Q6H     vancomycin (VANCOCIN) IV  1,000 mg Intravenous Q12H     hydrocortisone sodium succinate PF  100 mg Intravenous Q8H     IV fluid REPLACEMENT ONLY, glucose **OR** dextrose **OR** glucagon, lidocaine, lidocaine 4%, sodium chloride (PF), heparin lock flush, acetaminophen, miconazole, polyethylene glycol,  naloxone, senna-docusate, naloxone, midazolam      Review of Systems:   Unable to assess due to critical illness          Physical Exam:   Temp:  [99.7  F (37.6  C)-102  F (38.9  C)] 100.6  F (38.1  C)  Heart Rate:  [] 110  Resp:  [15-38] 22  BP: ()/(44-73) 87/64 mmHg  MAP:  [65 mmHg-193 mmHg] 79 mmHg  Arterial Line BP: ()/() 113/26 mmHg  FiO2 (%):  [50 %] 50 %  SpO2:  [17 %-100 %] 100 %    Intake/Output Summary (Last 24 hours) at 01/22/17 0627  Last data filed at 01/22/17 0500   Gross per 24 hour   Intake 3402.8 ml   Output   1175 ml   Net 2227.8 ml     Wt Readings from Last 4 Encounters:   01/21/17 54.432 kg (120 lb)   12/09/16 79.5 kg (175 lb 4.3 oz)   10/31/16 72.661 kg (160 lb 3 oz)   09/09/16 93.8 kg (206 lb 12.7 oz)     Arterial Line BP: ()/() 113/26 mmHg  MAP:  [65 mmHg-193 mmHg] 79 mmHg  BP - Mean:  [48-85] 73  CVP:  [8 mmHg-9 mmHg] 8 mmHg  Location:  [-]   Ventilation Mode: CMV/AC  FiO2 (%): 50 %  Rate Set (breaths/minute): 22 breaths/min  Tidal Volume Set (mL): 550 mL  PEEP (cm H2O): 5 cmH2O  Oxygen Concentration (%): 50 %  Resp: 22    Recent Labs  Lab 01/22/17  0500 01/21/17  2348   PH 7.56* 7.47*   PCO2 22* 26*   PO2 133* 117*   HCO3 20* 19*       GEN: no acute distress   HEENT: head ncat, sclera anicteric, OP patent, trachea midline    PULM: unlabored synchronous with vent, clear anteriorly    CV/COR: RRR S1S2 no gallop,  No rub, no murmur  ABD: soft, diffusely tender to palpation, hypoactive bowel sounds, no mass  EXT:  Edema   warm  NEURO: grossly intact  SKIN: no obvious rash      Assessment and plan :     Neurology/Psychiatry/Pain/Sedation:    1. Sedation for vent synchrony. Cont versed/fentanyl for RASS -2.  2. TBI/seizure. Cont tegratol and briviact    3. Panhypopituitarism. Cont desmopressin, synthroid, testosterone. Will initiate stress dose steroids for shock.     Cardiovascular/Hemodynamics/Pulmonary/GI/ID/Renal:    1. Septic shock in setting of  necrotic/cystic pancreatitis. Currently on levophed/vasopressin. IVF TKO overnight due to concern for IAH. Bladder pressures 14, 15 overnight. Making urine. Easy to ventilate/oxygenate. Will decrease RR given resp alkalosis. Lactate improving. Broad spectrum abx pending cultures. NPO due to shock, will start jejunal feeds once pressor requirements improve. Will await GI and surgery recs to help optimize management while here.     Endocrine  1. ICU glucose protocol.    Disposition/Code Status/Other  1. Critically ill due to pancreatitis/septic shock. Plan to cont supportive care and transfer to [facility] for GI/surgery evaluation when bed is available.    2. Code: FullI have personally reviewed the daily labs, imaging studies, cultures and discussed the case with referring physician and consulting physicians.     This patient is critically ill and I have provided 30 minutes of critical care time on January 22, 2017.     Marquez Green MD   Critical Care Staff  [phone]      Data:   All data and imaging reviewed     ROUTINE ICU LABS (Last four results)  CMP  Recent Labs  Lab 01/22/17  0500 01/21/17 2348 01/21/17  1600   NA  --  144 144   POTASSIUM  --  4.5 4.5   CHLORIDE  --  109 111*   CO2  --  20 18*   ANIONGAP  --  15* 15*   GLC  --  202* 106*   BUN  --  22 24   CR 0.91 1.04 1.14   GFRESTIMATED 87 74 67   GFRESTBLACK >90African American GFR Calc 90 81   STEVE  --  6.1* 6.8*   MAG 1.6  --   --    PHOS 1.9*  --   --    PROTTOTAL  --   --  6.1*   ALBUMIN  --   --  2.1*   BILITOTAL  --   --  1.0   ALKPHOS  --   --  63   AST  --   --  21   ALT  --   --  23     CBC  Recent Labs  Lab 01/22/17  0500 01/21/17  1600   WBC 24.1*  --    RBC 3.78*  --    HGB 8.9*  --    HCT 30.1*  --    MCV 80  --    MCH 23.5*  --    MCHC 29.6*  --    RDW 19.8*  --     224     INR  Recent Labs  Lab 01/22/17  0500   INR 2.48*     Arterial Blood Gas  Recent Labs  Lab 01/22/17  0500 01/21/17 2348   PH 7.56* 7.47*   PCO2 22* 26*   PO2 133* 117*   HCO3  20* 19*       All cultures:    Recent Labs  Lab 01/21/17  1608 01/21/17  1600   CULT No growth after 6 hours No growth after 6 hours     Recent Results (from the past 24 hour(s))   CT Abdomen Pelvis w Contrast    Narrative    CT ABDOMEN PELVIS WITH CONTRAST   1/21/2017 4:27 PM     HISTORY: Diffuse abdominal pain.    TECHNIQUE:  CT abdomen and pelvis with 60 mL Isovue-370 IV. Radiation  dose for this scan was reduced using automated exposure control,  adjustment of the mA and/or kV according to patient size, or iterative  reconstruction technique.    COMPARISON: CT abdomen and pelvis 11/28/2016.    FINDINGS: There are patchy areas of consolidation at the bilateral  lower lobe bases series 2 image 6 and adjacent images. There is small  ascites again noted, slightly smaller in the interval at the abdominal  level. There is a percutaneous gastrostomy in place. Enteric feeding  tube tip ends at the proximal jejunum. There are multiple fluid  collections there are new or significantly larger involving the  pancreas. For example, a large complex septated fluid collection  occupies the majority of the pancreatic body and tail. There is no  definable discrete fluid in this region on the prior exam. It is  currently approximately 11.8 x 4.7 cm in transverse plane series 2  image 32. An additional new fluid collection at the pancreatic head is  2.3 x 2.1 cm series 2 image 39. Much of the pancreas parenchyma is  replaced by these collections, but there is normal enhancement of the  remaining solid appearing portions of the pancreas in the region of  the pancreatic neck and head, and uncinate process. Pancreatic duct is  obscured.    The liver, gallbladder, spleen, and kidneys show no acute findings.  There is additional focal fluid abutting the posterior aspect of the  stomach. One example measures 4.7 x 1.8 cm image 40. Another locule  can be seen towards the left image 33. There is edema throughout the  mesentery. There is no  bowel obstruction identified. No free air.      Impression    IMPRESSION:  1. Development of prominent complex fluid collections replacing much  of the pancreatic body and tail, along with a new fluid collection at  the pancreatic head. This is consistent with pancreatitis and acute  peripancreatic necrotic fluid collections. There is some residual  normal enhancing pancreatic parenchyma at the neck, regions of the  head, and uncinate process.  2. Bilateral lower lobe patchy consolidation worrisome for multifocal  pneumonia.  3. Ascites in the abdomen and pelvis is of small volume and is stable  versus just slightly smaller.  4. Other fluid collections noted adjacent to the stomach related to  pancreatitis.     ZAIRE HILL MD   Chest  XR, 1 view PORTABLE    Narrative    CHEST PORTABLE ONE VIEW    1/21/2017 4:38 PM     HISTORY: Vented patient, fever, sepsis, question aspiration. CXR  earlier prior to fluids showed questionable infiltrate in RML and  chronic posterior medial base infiltrates.    COMPARISON: Chest x-ray 11/28/2016.      Impression    IMPRESSION: Patchy bibasilar opacities persist and may represent  multifocal pneumonia and/or atelectasis. Endotracheal tube in  appropriate position. Left PICC line tip ends in the region of the  right atrium. Consider retraction of this line by approximately 3.8 cm  for placement in the region of the cavoatrial junction.     ZAIRE HILL MD

## 2017-01-22 NOTE — H&P
[facility]   CRITICAL CARE ADMISSION/CONSULTATION    Keyon Farias MRN: [id number]  1960  Date of Admission:1/21/2017          HPI   Keyon Farias IS a 56 year old male admitted on 1/21/2017 with significant history for TBI, seizure disorder, panhypopituitarism, CVA who transferred from [facility] due to fever, worsening respiratory failure ans hypotension. He was recently DC'd from [facility] on 12/9 following an extensive stay for seizure complicated by sepsis, non-ischemic vfib arrest (deferred AICD due to sepsis). Apparently, he has been progressing at [facility], whereby his vent was weaned to trach dome. Today, he had increasing emesis in addition to abdominal pain complicated by hypotension requiring IVF+levophed, acute respiratory failure requiring mechanical ventilation and fever with initiation of broad spectrum abx. He was sent to the ED to r/o an acute abdomen. ED reported soft abdomen and CT a/p demonstrates large pancreatic cysts and necrosis. Of note, he had CT back in November that was suggestive of pancreatitis. He received ~5L IVF.            Past Medical History:      Past Medical History   Diagnosis Date     Traumatic brain injury (H) 1989     Gastro-oesophageal reflux disease      Unspecified cerebral artery occlusion with cerebral infarction 1989     Seizures (H)      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     Thyroid disease      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      Ventricular tachyarrhythmia (H)      Ventricular fibrillation (H)      Septic shock (H)      Pneumonia      UTI (urinary tract infection)      DVT of upper extremity (deep vein thrombosis) (H)      Tracheostomy care (H)      Spastic hemiplegia affecting dominant side (H)      related to wil injury     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)      Patient has little porductive speech but at baseline can understand simple commands consistently              Past Surgical History:      Past Surgical History   Procedure Laterality Date     Orthopedic surgery       right hand repair     Head & neck surgery       reconstructive facial surgery following accident in 1989     Vascular surgery       Laparoscopic appendectomy  7/30/2013     Procedure: LAPAROSCOPIC APPENDECTOMY;  LAPAROSCOPIC APPENDECTOMY;  Surgeon: Manish Pierce MD;  Location:  OR     Esophagoscopy, gastroscopy, duodenoscopy (egd), combined  3/13/2014     Procedure: COMBINED ESOPHAGOSCOPY, GASTROSCOPY, DUODENOSCOPY (EGD), BIOPSY SINGLE OR MULTIPLE;  gastroscopy;  Surgeon: Digna Rhodes MD;  Location:  GI     Tracheostomy N/A 9/3/2016     Procedure: TRACHEOSTOMY;  Surgeon: João Ortiz MD;  Location:  OR     Laparoscopic assisted insertion tube gastrotomy N/A 9/7/2016     Procedure: LAPAROSCOPIC ASSISTED INSERTION TUBE GASTROSTOMY;  Surgeon: Manish Pierce MD;  Location:  OR     Tracheostomy N/A 12/2/2016     Procedure: TRACHEOSTOMY;  Surgeon: João Ortiz MD;  Location:  OR     Esophagoscopy, gastroscopy, duodenoscopy (egd), combined N/A 12/6/2016     Procedure: COMBINED ESOPHAGOSCOPY, GASTROSCOPY, DUODENOSCOPY (EGD);  Surgeon: Digna Rhodes MD;  Location:  GI            Social History:     Social History   Substance Use Topics     Smoking status: Former Smoker     Quit date: 04/23/1989     Smokeless tobacco: Not on file     Alcohol Use: No          Family History:   No family history on file.          Allergies:   Please see allergy list which was reviewed this admission.         Medications:      .  Current Facility-Administered Medications   Medication     lactated ringers infusion     norepinephrine (LEVOPHED) 16 mg in D5W 250 mL infusion     Current Outpatient Prescriptions   Medication     calcium carbonate 1250 (500 CA) MG/5ML SUSP suspension     DESMOPRESSIN ACETATE PO     Hydrocortisone Sod Succinate (SOLU-CORTEF IJ)      PREDNISONE PO     Brivaracetam (BRIVIACT) 10 MG/ML solution     pantoprazole (PROTONIX) 2 mg/mL suspension     potassium & sodium phosphates (NEUTRA-PHOS) 280-160-250 MG Packet     melatonin 1 MG TABS tablet     aspirin 10 mg/mL     Heparin Sodium, Porcine, (HEPARIN SODIUM PF) 5000 UNIT/0.5ML injection     miconazole (MICATIN; MICRO GUARD) 2 % powder     bisacodyl (DULCOLAX) 10 MG Suppository     polyethylene glycol (MIRALAX/GLYCOLAX) Packet     carBAMazepine (TEGRETOL) 100 MG chewable tablet     Potassium Chloride 40 MEQ/15ML (20%) SOLN     ACETAMINOPHEN PO     testosterone cypionate (DEPOTESTOTERONE CYPIONATE) 200 MG/ML injection     chlorhexidine (PERIDEX) 0.12 % solution     levothyroxine (SYNTHROID, LEVOTHROID) 137 MCG tablet     [DISCONTINUED] levothyroxine (SYNTHROID/LEVOTHROID) 137 MCG tablet     protein modular (PROSTAT SUGAR FREE)     insulin aspart (NOVOLOG PEN) 100 UNIT/ML injection          Review of Systems:   Unable to assess due to critical illness         Physical Examination:   Temp:  [100.7  F (38.2  C)] 100.7  F (38.2  C)  Heart Rate:  [130-147] 144  Resp:  [20-38] 22  BP: ()/(44-73) 105/71 mmHg  SpO2:  [84 %-99 %] 99 %    Intake/Output Summary (Last 24 hours) at 01/21/17 1808  Last data filed at 01/21/17 1732   Gross per 24 hour   Intake   1632 ml   Output      0 ml   Net   1632 ml     Wt Readings from Last 4 Encounters:   01/21/17 54.432 kg (120 lb)   12/09/16 79.5 kg (175 lb 4.3 oz)   10/31/16 72.661 kg (160 lb 3 oz)   09/09/16 93.8 kg (206 lb 12.7 oz)     BP - Mean:  [48-85] 81  Location:  [-]   Ventilation Mode: CMV/AC  Rate Set (breaths/minute): 12 breaths/min  Tidal Volume Set (mL): 550 mL  PEEP (cm H2O): 5 cmH2O  Oxygen Concentration (%): 50 %  Resp: 22  No lab results found in last 7 days.    GEN: no acute distress   HEENT: head ncat, sclera anicteric, OP patent, trachea midline   PULM: unlabored synchronous with vent, clear anteriorly    CV/COR: RRR S1S2 no gallop,  No rub, no  murmur  ABD: soft, diffusely tender to palpation, hypoactive bowel sounds, no mass  EXT:  Edema   warm  NEURO: grossly intact  SKIN: no obvious rash      Assessment and plan :     Neurology/Psychiatry/Pain/Sedation:   1. Sedation for vent synchrony. Cont versed/fentanyl for RASS -2.  2. TBI/seizure. Cont tegratol and briviact   3. Panhypopituitarism. Cont desmopressin, synthroid, testosterone. Will initiate stress dose steroids for shock.     Cardiovascular/Hemodynamics/Pulmonary/GI/ID/Renal:   1. Septic shock in setting of necrotic/cystic pancreatitis. Currently on levophed/vasopressin. Has modest response to volume. Cont D5LR at 200/hr. Check lactate q4 anabel. Discussed case with GI. Will transfer to [facility] for further evaluation/management of pancreatitis. Easy to ventilate/oxygenate. CXR in am. UOP adequate and cont to monitor. Initiate meropenem and vanco for sepsis pending cx. NPO due to shock, will start jejunal feeds once pressor requirements improve. Will consult GI and surgery here to help optimize management while here.    Endocrine  1. ICU glucose protocol.    Disposition/Code Status/Other  1. Critically ill due to pancreatitis/septic shock. Plan to cont supportive care and transfer to [facility] for GI/surgery evaluation.   2. Code: Full    ICU Prophylaxis:   1. DVT: Hep Subq  2. VAP: HOB 30 degrees, chlorhexidine rinse  3. Stress Ulcer: PPI  4. Restraints: Nonviolent soft two point restraints required and necessary for patient safety and continued cares and good effect as patient continues to pull at necessary lines, tubes despite education and distraction. Will readdress daily.   5. IV Access - central access required and necessary for continued patient cares  6. Feeding - NPO    I have personally reviewed the daily labs, imaging studies, cultures and discussed the case with referring physician and consulting physicians.     This patient is critically ill and I have provided 30 minutes of critical care time on  January 21, 2017.     Marquez Green MD   Critical Care Staff  [phone]         Data:   All data and imaging reviewed     ROUTINE ICU LABS (Last four results)  CMP  Recent Labs  Lab 01/21/17  1600      POTASSIUM 4.5   CHLORIDE 111*   CO2 18*   ANIONGAP 15*   *   BUN 24   CR 1.14   GFRESTIMATED 67   GFRESTBLACK 81   STEVE 6.8*     All cultures:  No results for input(s): CULT in the last 168 hours.  Recent Results (from the past 24 hour(s))   CT Abdomen Pelvis w Contrast    Narrative    CT ABDOMEN PELVIS WITH CONTRAST   1/21/2017 4:27 PM     HISTORY: Diffuse abdominal pain.    TECHNIQUE:  CT abdomen and pelvis with 60 mL Isovue-370 IV. Radiation  dose for this scan was reduced using automated exposure control,  adjustment of the mA and/or kV according to patient size, or iterative  reconstruction technique.    COMPARISON: CT abdomen and pelvis 11/28/2016.    FINDINGS: There are patchy areas of consolidation at the bilateral  lower lobe bases series 2 image 6 and adjacent images. There is small  ascites again noted, slightly smaller in the interval at the abdominal  level. There is a percutaneous gastrostomy in place. Enteric feeding  tube tip ends at the proximal jejunum. There are multiple fluid  collections there are new or significantly larger involving the  pancreas. For example, a large complex septated fluid collection  occupies the majority of the pancreatic body and tail. There is no  definable discrete fluid in this region on the prior exam. It is  currently approximately 11.8 x 4.7 cm in transverse plane series 2  image 32. An additional new fluid collection at the pancreatic head is  2.3 x 2.1 cm series 2 image 39. Much of the pancreas parenchyma is  replaced by these collections, but there is normal enhancement of the  remaining solid appearing portions of the pancreas in the region of  the pancreatic neck and head, and uncinate process. Pancreatic duct is  obscured.    The liver, gallbladder,  spleen, and kidneys show no acute findings.  There is additional focal fluid abutting the posterior aspect of the  stomach. One example measures 4.7 x 1.8 cm image 40. Another locule  can be seen towards the left image 33. There is edema throughout the  mesentery. There is no bowel obstruction identified. No free air.      Impression    IMPRESSION:  1. Development of prominent complex fluid collections replacing much  of the pancreatic body and tail, along with a new fluid collection at  the pancreatic head. This is consistent with pancreatitis and acute  peripancreatic necrotic fluid collections. There is some residual  normal enhancing pancreatic parenchyma at the neck, regions of the  head, and uncinate process.  2. Bilateral lower lobe patchy consolidation worrisome for multifocal  pneumonia.  3. Ascites in the abdomen and pelvis is of small volume and is stable  versus just slightly smaller.  4. Other fluid collections noted adjacent to the stomach related to  pancreatitis.    Chest  XR, 1 view PORTABLE    Narrative    CHEST PORTABLE ONE VIEW    1/21/2017 4:38 PM     HISTORY: Vented patient, fever, sepsis, question aspiration. CXR  earlier prior to fluids showed questionable infiltrate in RML and  chronic posterior medial base infiltrates.    COMPARISON: Chest x-ray 11/28/2016.      Impression    IMPRESSION: Patchy bibasilar opacities persist and may represent  multifocal pneumonia and/or atelectasis. Endotracheal tube in  appropriate position. Left PICC line tip ends in the region of the  right atrium. Consider retraction of this line by approximately 3.8 cm  for placement in the region of the cavoatrial junction.

## 2017-01-22 NOTE — PROVIDER NOTIFICATION
All labs reported to Dr Ash. Pt T/C then to receive FFP, plan for IR later today. Pt's mother updated through out the am. csccrn

## 2017-01-22 NOTE — PROGRESS NOTES
Pt remains on full vent support throughout the night. No PS this shift. Trach remains in place and trach site remains clean. No other significant events. Current vent settings Ventilation Mode: CMV/AC  FiO2 (%): 50 %  Rate Set (breaths/minute): 22 breaths/min  Tidal Volume Set (mL): 550 mL  PEEP (cm H2O): 5 cmH2O  Oxygen Concentration (%): 50 %  Resp: 22  Will continue to follow.    Bartolo Pepe

## 2017-01-22 NOTE — CONSULTS
[facility] General Surgery Consultation    Keyon Farias MRN# [id number]   YOB: 1960 Age: 56 year old      Date of Admission:  1/21/2017  Date of Consult: 1/22/2017         Assessment and Plan:   Patient is a 56 year old male with a complicated PMH including h/o TBI, seizures and recent cardiac arrest who presented from [facility] with fever, hypotension and abdominal pain. Abdominal CT reveals development of prominent complex fluid collections replacing much of the pancreatic body and tail, along with a new fluid collection at the pancreatic head, consistent with pancreatitis and acute peripancreatic necrotic fluid collections., He also has bilateral lung infiltrates concerning for pna. His LFTs are normal.    Surgery was consulted due to the new pancreatic necrosis and fluid collections. Unclear etiology of acute necrotic pancreatitis, recommend abdominal US for assessment of gallbladder. Consider IR consult for FNA to determine if infected vs sterile necrosis. Continue aggressive medical management in this critically ill patient. Surgery would be a last resort if no improvement with medical management. Agree with transfer to [facility] when bed available.     PLAN:  NPO, Gastrostomy tube to low intermittent suction  Abdominal US  Consult IR for FNA vs drain placement    Addendum: I spoke with Dr. Marquez with IR and plan for CT guided drainage of fluid with cultures for gram stain/aerobe/anaeobe. Pt INR is 2.5 - 2U FFP ordered stat - would like at least one transfused and 2nd unit infusing prior to procedure. Order placed for CT guided drainage.            Requesting Physician:      Dr. Green         Chief Complaint:     Chief Complaint   Patient presents with     Hypotension     sent from [facility] for low BP, fever      Fever          History of Present Illness:   Keyon Farias is a 56 year old male who was seen in consultation at the request of  who presented with  "fever, abdominal pain, hypotension from his LTACH. He has a very complicated PMH. All information gathered from chart. Pt able to respond yes/no to questions at bedside. He currently denies abdominal pain. No nausea. No further vomiting. .            Physical Exam:   Blood pressure 110/71, temperature 99.5  F (37.5  C), temperature source Rectal, resp. rate 13, height 1.651 m (5' 5\"), weight 54.432 kg (120 lb), SpO2 100 %.  120 lbs 0 oz  General: eyes closed, opens to voice  Psych: unable to assess  Eyes: Sclera clear  Neck: tracheostomy in place  Respiratory:  Mechanical ventilation  Cardiovascular:  Regular Rate and Rhythm   GI: abdomen firm, tender to palpation in epigastrium, no rebound or guarding, ecchymosis across lower abdomen, gastrostomy tube in place  Lymphatic/Hematologic/Immune:  No femoral or cervical lymphadenopathy.  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      Ventricular tachyarrhythmia (H)      Ventricular fibrillation (H)      Septic shock (H)      Pneumonia      UTI (urinary tract infection)      DVT of upper extremity (deep vein thrombosis) (H)      Tracheostomy care (H)      Spastic hemiplegia affecting dominant side (H)      related to wil injury     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)      Patient has little porductive speech but at baseline can understand simple commands consistently            Past Surgical History:     Past Surgical History   Procedure Laterality Date     Orthopedic surgery       right hand repair     Head & neck surgery       reconstructive facial surgery following accident " in 1989     Vascular surgery       Laparoscopic appendectomy  7/30/2013     Procedure: LAPAROSCOPIC APPENDECTOMY;  LAPAROSCOPIC APPENDECTOMY;  Surgeon: Manish Pierce MD;  Location:  OR     Esophagoscopy, gastroscopy, duodenoscopy (egd), combined  3/13/2014     Procedure: COMBINED ESOPHAGOSCOPY, GASTROSCOPY, DUODENOSCOPY (EGD), BIOPSY SINGLE OR MULTIPLE;  gastroscopy;  Surgeon: Digna Rhodes MD;  Location:  GI     Tracheostomy N/A 9/3/2016     Procedure: TRACHEOSTOMY;  Surgeon: João Ortiz MD;  Location:  OR     Laparoscopic assisted insertion tube gastrotomy N/A 9/7/2016     Procedure: LAPAROSCOPIC ASSISTED INSERTION TUBE GASTROSTOMY;  Surgeon: Manish Pierce MD;  Location:  OR     Tracheostomy N/A 12/2/2016     Procedure: TRACHEOSTOMY;  Surgeon: João Ortiz MD;  Location:  OR     Esophagoscopy, gastroscopy, duodenoscopy (egd), combined N/A 12/6/2016     Procedure: COMBINED ESOPHAGOSCOPY, GASTROSCOPY, DUODENOSCOPY (EGD);  Surgeon: Digna Rhodes MD;  Location:  GI            Current Medications:           levothyroxine  137 mcg Per Feeding Tube QAM AC     testosterone cypionate  200 mg Intramuscular Weekly     carBAMazepine  200 mg Per G Tube 4x Daily     aspirin  81 mg Oral Daily     bisacodyl  10 mg Rectal Daily     protein modular  1 packet Per Feeding Tube BID     pantoprazole  40 mg Per Feeding Tube BID     calcium carbonate  1,250 mg Oral TID w/meals     desmopressin (DDAVP) tablet 100 mcg  100 mcg Oral Daily     heparin  5,000 Units Subcutaneous Q8H     chlorhexidine  15 mL Mouth/Throat Q12H     Brivaracetam  50 mg Intravenous BID     meropenem  500 mg Intravenous Q6H     vancomycin (VANCOCIN) IV  1,000 mg Intravenous Q12H     hydrocortisone sodium succinate PF  100 mg Intravenous Q8H       IV fluid REPLACEMENT ONLY, glucose **OR** dextrose **OR** glucagon, sodium phosphate (NaPHOS) intermittent infusion, sodium phosphate (NaPHOS)  intermittent infusion, sodium phosphate (NaPHOS) intermittent infusion, magnesium sulfate, acetaminophen, miconazole, polyethylene glycol, senna-docusate, naloxone, midazolam         Home Medications:     Prior to Admission medications    Medication Sig Last Dose Taking? Auth Provider   calcium carbonate 1250 (500 CA) MG/5ML SUSP suspension Take 1,250 mg by mouth 3 times daily (with meals) 1/21/2017 at am Yes Unknown, Entered By History   DESMOPRESSIN ACETATE PO Take 100 mcg by mouth daily  1/21/2017 at am Yes Unknown, Entered By History   Hydrocortisone Sod Succinate (SOLU-CORTEF IJ) Inject 75 mg into the vein every 6 hours Ordered  but never given 1/21/2017 at 1203 Yes Unknown, Entered By History   PREDNISONE PO Take by mouth 2 times daily Take 20 mg every AM and 10 mg every pm     (discontinued 1/21/2017 AM) 1/21/2017 at 09:00 Yes Unknown, Entered By History   Brivaracetam (BRIVIACT) 10 MG/ML solution Take 50 mg by mouth 2 times daily 1/21/2017 at am Yes Unknown, Entered By History   mineral oil-hydrophilic petrolatum (AQUAPHOR) Apply topically daily 1/21/2017 at am Yes Unknown, Entered By History   Clotrimazole (DESENEX EX) Externally apply topically 2 times daily 1/21/2017 at am Yes Unknown, Entered By History   VITAMIN D, CHOLECALCIFEROL, PO Take 2,000 Units by mouth daily 1/21/2017 at am Yes Unknown, Entered By History   pantoprazole (PROTONIX) 2 mg/mL suspension 20 mLs (40 mg) by Per Feeding Tube route 2 times daily 1/21/2017 at am Yes Elen Carrillo MD   potassium & sodium phosphates (NEUTRA-PHOS) 280-160-250 MG Packet Take 1 packet by mouth 4 times daily 1/21/2017 at 1203 Yes Elen Carrillo MD   melatonin 1 MG TABS tablet Take 6 tablets (6 mg) by mouth At Bedtime 1/20/2017 at hs Yes Alicia Roca APRN CNP   aspirin 10 mg/mL Take 8.1 mLs (81 mg) by mouth daily 1/21/2017 at am Yes Alicia Roca APRN CNP   Heparin Sodium, Porcine, (HEPARIN SODIUM PF) 5000 UNIT/0.5ML injection  Inject 0.5 mLs (5,000 Units) Subcutaneous every 8 hours 1/21/2017 at 5 am Yes Alicia Roca APRN CNP   miconazole (MICATIN; MICRO GUARD) 2 % powder Apply topically every hour as needed for other (topical candidiasis) prn Yes Alicia Roca APRN CNP   bisacodyl (DULCOLAX) 10 MG Suppository Place 1 suppository (10 mg) rectally daily 1/21/2017 at am Yes Alicia Roca APRN CNP   polyethylene glycol (MIRALAX/GLYCOLAX) Packet Take 17 g by mouth 2 times daily as needed (constipation) prn Yes Alicia Roca APRN CNP   protein modular (PROSTAT SUGAR FREE) 1 packet by Per Feeding Tube route 2 times daily 1/21/2017 at am Yes Alicia Roca APRN CNP   carBAMazepine (TEGRETOL) 100 MG chewable tablet 200 mg by Per G Tube route 4 times daily 0600, 1100, 1500, 1900. Administer 1 hour before and 1 hour after tube feeding 1/21/2017 at 1202 Yes Unknown, Entered By History   Potassium Chloride 40 MEQ/15ML (20%) SOLN Take 30 mEq by mouth 2 times daily 1/21/2017 at am Yes Unknown, Entered By History   ACETAMINOPHEN  mg by Per Feeding Tube route every 4 hours as needed for pain prn Yes Unknown, Entered By History   testosterone cypionate (DEPOTESTOTERONE CYPIONATE) 200 MG/ML injection Inject 200 mg into the muscle once a week Friday 1/20/2017 at 0900 Yes Unknown, Entered By History   chlorhexidine (PERIDEX) 0.12 % solution Swish and spit 15 mLs in mouth 2 times daily 1/21/2017 at am Yes Shea Xiong MD   levothyroxine (SYNTHROID, LEVOTHROID) 137 MCG tablet 1 tablet (137 mcg) by Per Feeding Tube route every morning (before breakfast) 1/21/2017 at am Yes Shea Xiong MD            Allergies:     Allergies   Allergen Reactions     Dilantin [Phenytoin Sodium]             Family History:   No family history on file.        Social History:   Keyon Farias  reports that he quit smoking about 27 years ago. He does not have any smokeless tobacco history on file. He reports  that he does not drink alcohol or use illicit drugs.          Review of Systems:   The 10 point Review of Systems is negative other than noted in the HPI.         Labs/Imaging   All new lab and imaging data was reviewed.   I have personally reviewed the imaging studies  .  TIME SPENT:  60 minutes was spent with patient and greater than 50% of the time was spent counseling and coordination of care.      Diya Reilly MD

## 2017-01-22 NOTE — DISCHARGE SUMMARY
Please refer to DC summary on 1/23/17    Marquez Green MD  Pulmonary, Allergy and Critical Care Medicine  [facility]  [phone]

## 2017-01-22 NOTE — PROGRESS NOTES
GI    I received a call to see this patient in consultation for pancreatitis.  On reviewing his EHR, he appears to be followed by [facility]. I spoke with Dr. Jossie Gary, and she will see him in consultation.     Manish Pelayo MD

## 2017-01-22 NOTE — PHARMACY-ADMISSION MEDICATION HISTORY
Admission medication history interview status for the 1/21/2017  admission is complete. See EPIC admission navigator for prior to admission medications     Medication history source reliability:Good    Actions taken by pharmacist (provider contacted, etc): Reviewed meds with Anna RN and charge RN      Additional medication history information not noted on PTA med list : Anna RN stated patient had been hydrocortisone IV ordered this AM but did not start  Because poral prednsione had just been given.  RN identified patient was no longer taking insulin or valproic acid and patient was taking Briviact and . Desmopressin 0.1 mg daily     Medication reconciliation/reorder completed by provider prior to medication history? Yes    Time spent in this activity:  1 hour    Prior to Admission medications    Medication Sig Last Dose Taking? Auth Provider   calcium carbonate 1250 (500 CA) MG/5ML SUSP suspension Take 1,250 mg by mouth 3 times daily (with meals) 1/21/2017 at Unknown time Yes Unknown, Entered By History   DESMOPRESSIN ACETATE PO Take 100 mcg by mouth daily Per [facility] RN 1/21/2017 at am Yes Unknown, Entered By History   Hydrocortisone Sod Succinate (SOLU-CORTEF IJ) Inject 75 mg as directed every 6 hours Ordered  but never given  at Unknown time Yes Unknown, Entered By History   PREDNISONE PO Take by mouth 2 times daily Take 20 mg every AM and 10 mg every pm     (discontinued 1/21/2017 AM) 1/21/2017 at 09:00 Yes Unknown, Entered By History   Brivaracetam (BRIVIACT) 10 MG/ML solution Take 50 mg by mouth 2 times daily 1/21/2017 at Unknown time Yes Unknown, Entered By History   pantoprazole (PROTONIX) 2 mg/mL suspension 20 mLs (40 mg) by Per Feeding Tube route 2 times daily 1/21/2017 at am Yes Elen Carrillo MD   potassium & sodium phosphates (NEUTRA-PHOS) 280-160-250 MG Packet Take 1 packet by mouth 4 times daily 1/21/2017 at am Yes Elen Carrillo MD   melatonin 1 MG TABS tablet Take 6 tablets (6 mg)  by mouth At Bedtime 1/20/2017 at hs Yes Alicia Roca APRN CNP   aspirin 10 mg/mL Take 8.1 mLs (81 mg) by mouth daily 1/21/2017 at am Yes Alicia Roca APRN CNP   Heparin Sodium, Porcine, (HEPARIN SODIUM PF) 5000 UNIT/0.5ML injection Inject 0.5 mLs (5,000 Units) Subcutaneous every 8 hours 1/21/2017 at 5 am Yes Alicia Roca APRN CNP   miconazole (MICATIN; MICRO GUARD) 2 % powder Apply topically every hour as needed for other (topical candidiasis) prn Yes Alicia Roca APRN CNP   bisacodyl (DULCOLAX) 10 MG Suppository Place 1 suppository (10 mg) rectally daily 1/20/2017 at am Yes Alicia Roca APRN CNP   polyethylene glycol (MIRALAX/GLYCOLAX) Packet Take 17 g by mouth 2 times daily as needed (constipation) prn Yes Alicia Roca APRN CNP   carBAMazepine (TEGRETOL) 100 MG chewable tablet 200 mg by Per G Tube route 4 times daily 0600, 1100, 1500, 1900. Administer 1 hour before and 1 hour after tube feeding 1/21/2017 at am Yes Unknown, Entered By History   Potassium Chloride 40 MEQ/15ML (20%) SOLN Take 30 mEq by mouth 2 times daily 1/21/2017 at am Yes Unknown, Entered By History   ACETAMINOPHEN  mg by Per Feeding Tube route every 4 hours as needed for pain prn Yes Unknown, Entered By History   testosterone cypionate (DEPOTESTOTERONE CYPIONATE) 200 MG/ML injection Inject 200 mg into the muscle once a week Friday 1/13/2017 at Unknown time Yes Unknown, Entered By History   chlorhexidine (PERIDEX) 0.12 % solution Swish and spit 15 mLs in mouth 2 times daily 1/21/2017 at am Yes Shea Xiong MD   levothyroxine (SYNTHROID, LEVOTHROID) 137 MCG tablet 1 tablet (137 mcg) by Per Feeding Tube route every morning (before breakfast) 1/21/2017 at am Yes Shea Xiong MD   protein modular (PROSTAT SUGAR FREE) 1 packet by Per Feeding Tube route 2 times daily   Roca, NATHANIEL Bee CNP

## 2017-01-22 NOTE — PROGRESS NOTES
7:30 pm on 1/21/2017    Pt accepted for transfer to ICU at [facility].     Pending arrangement of transfer.     Smith Cordero MD  Pulmonary and Critical Care  [facility]  Pager:  [phone]

## 2017-01-22 NOTE — PROGRESS NOTES
Tele-ICU Progress Note:     Reason for rounding / chart review:  All by bedside nursing, routine rounding  Vital signs reviewed:  YES  Labs reviewed:  YES  Tele-ICU camera used:  NO    Keyon Farias is a 56 year old male with increasing abdominal discomfort found to have a large pancreatic cyst and pancreatic necrosis with worsening lactic acidosis.  PH 7.17.  He is currently on two vasopressors including Levophed and vasopressin.  Lactic acidosis worsening.  Plan at this time is to change fluids to D5 with sodium bicarbonate, increased respiratory rate on the ventilator, repeat ABG, serial troponins, INS calcium, obtain CVP.  Add stress dose steroids.     --> see above    Smith Cordero MD  Pulmonary and Critical Care  [facility]  Pager:  [phone]

## 2017-01-22 NOTE — PROCEDURES
RADIOLOGY PROCEDURE NOTE  Patient name: Keyon Farias  MRN: [id number]  : 1960    Pre-procedure diagnosis: pancreatic fluid collection  Post-procedure diagnosis: Same    Procedure Date/Time: 2017  3:01 PM  Procedure: CT guided CT aspiration of fluid collection at/near the pancreatic tail.  100 ml of beige fluid removed.  No apparent complication.  INR 2.5 earlier, treated with 2 units of FFP.  Did not place drain for concern this collection may be a pseudocyst.  Therefore, decision made for aspiration for diagnostic purposes.  Sample sent to lab for evaluation.  Estimated blood loss: < 5 ml  Specimen(s) collected with description: 100 ml of beige fluid  The patient tolerated the procedure well with no immediate complications.  Significant findings:  Please see above.    See imaging dictation for procedural details.    Provider name: Brandon Villafana  Assistant(s):None

## 2017-01-22 NOTE — PROCEDURES
Procedure/Surgery Information  [facility]    Bedside Procedure Note  Date of Service (when I performed the procedure): 01/22/2017    Keyon Farias is a 56 year old male patient.  1. Septic shock (H)    2. Pneumonia due to infectious organism, unspecified laterality, unspecified part of lung    3. 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      Ventricular tachyarrhythmia (H)      Ventricular fibrillation (H)      Septic shock (H)      Pneumonia      UTI (urinary tract infection)      DVT of upper extremity (deep vein thrombosis) (H)      Tracheostomy care (H)      Spastic hemiplegia affecting dominant side (H)      related to wil injury     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 Written consent obtained.  Risks and benefits: risks, benefits and alternatives were discussed  Consent given by: guardian  Relevant documents: relevant documents present and verified  Test results: test results available and properly labeled  Site marked: the operative site was marked  Imaging studies: imaging studies available  Required items: required blood products, implants, devices, and special equipment  "available  Patient identity confirmed: arm band, provided demographic data and hospital-assigned identification number  Time out: Immediately prior to procedure a \"time out\" was called to verify the correct patient, procedure, equipment, support staff and site/side marked as required.  Preparation: Patient was prepped and draped in the usual sterile fashion.  Indications: multiple ABGs, respiratory failure and hemodynamic monitoring  Location: right radial  Dominic's test normal: yes  Needle gauge: 20  Seldinger technique: Seldinger technique used  Number of attempts: 1  Post-procedure: line sutured and dressing applied         Marquez Green"

## 2017-01-22 NOTE — PHARMACY-VANCOMYCIN DOSING SERVICE
Pharmacy Vancomycin Initial Note  Date of Service 2017  Patient's  1960  56 year old, male    Indication: Sepsis    Current estimated CrCl = Estimated Creatinine Clearance: 57 mL/min (based on Cr of 1.14). - may not be accurate representation of renal function as patient is wheelchair bound  Creatinine for last 3 days  2017:  4:00 PM Creatinine 1.14 mg/dL    Nephrotoxins and other renal medications (Future)    Start     Dose/Rate Route Frequency Ordered Stop    17 2300  vancomycin (VANCOCIN) 1000 mg in dextrose 5% 200 mL PREMIX      1,000 mg Intravenous EVERY 12 HOURS 17 2231      17 1845  norepinephrine (LEVOPHED) 16 mg in D5W 250 mL infusion      0.03-0.4 mcg/kg/min × 54.4 kg  1.5-20.4 mL/hr  Intravenous CONTINUOUS 17 1833      17 1845  vasopressin (PITRESSIN) 40 Units in D5W 40 mL infusion      2.4 Units/hr  2.4 mL/hr  Intravenous CONTINUOUS 17 1833            Contrast Orders - past 72 hours (72h ago through future)    Start     Dose/Rate Route Frequency Ordered Stop    17 1607  iopamidol (ISOVUE-370) solution 60 mL      60 mL Intravenous ONCE 17 1606 17 1623         Plan:  1.  Patient had Vanco 1gm at [facility] at 0507 today. Start vancomycin 1000 mg IV q12h.   2.  Goal Trough Level: 15-20 mg/L   3.  Pharmacy will check trough levels as appropriate in 3-4 doses.    4. Serum creatinine levels will be ordered daily for the first week of therapy and at least twice weekly for subsequent weeks.    5. Barbeau method utilized to dose vancomycin therapy: Method 2    Jean Paul Sarabia

## 2017-01-22 NOTE — PROVIDER NOTIFICATION
Spoke with Dr Cordero in Tele Re: placing PIV in foot of patient because of limited access. Md domingo'd use of foot, IV to be removed after 12 hours, (1200 1/22/17).

## 2017-01-22 NOTE — CONSULTS
GASTROENTEROLOGY CONSULTATION     Keyon Farias  [address] [place] [address]  56 year old male    Admission Date/Time: 1/21/2017  Primary Care Provider: Julee Roberson    We were asked to see the patient in consultation by Dr. Green for evaluation of severe pancreatitis.        HPI:  Keyon Farias is a 56 year old male who has a complex past medical history.  He was initially admitted in November 2016 with a history of increasingly poorly controlled seizures.  Initially on that admission he was hypotensive and then underwent a vfib cardiac arrest. Subsequent coronary angiogram unrevealing.  EGD done 12/6/16 due to concern for GI bleeding revealed esophagitis.  He was discharged in December to a nursing home with a trach.  Yesterday he reportedly had emesis, abdominal pain, hypotension and respiratory failure requiring the ventilator again.    CT done in November during previous hospitalization for rising tube feed residuals showed stranding around the pancreas with a normal appearing gallbladder. CT yesterday showed concern for necrotizing pancreatitis.      ROS: A comprehensive ten point review of systems was negative aside from those in mentioned in the HPI.      MEDICATIONS:   No current facility-administered medications on file prior to encounter.  Current Outpatient Prescriptions on File Prior to Encounter:  pantoprazole (PROTONIX) 2 mg/mL suspension 20 mLs (40 mg) by Per Feeding Tube route 2 times daily   potassium & sodium phosphates (NEUTRA-PHOS) 280-160-250 MG Packet Take 1 packet by mouth 4 times daily   melatonin 1 MG TABS tablet Take 6 tablets (6 mg) by mouth At Bedtime   aspirin 10 mg/mL Take 8.1 mLs (81 mg) by mouth daily   Heparin Sodium, Porcine, (HEPARIN SODIUM PF) 5000 UNIT/0.5ML injection Inject 0.5 mLs (5,000 Units) Subcutaneous every 8 hours   miconazole (MICATIN; MICRO GUARD) 2 % powder Apply topically every hour as needed for other (topical candidiasis)   bisacodyl (DULCOLAX) 10 MG  Suppository Place 1 suppository (10 mg) rectally daily   polyethylene glycol (MIRALAX/GLYCOLAX) Packet Take 17 g by mouth 2 times daily as needed (constipation)   protein modular (PROSTAT SUGAR FREE) 1 packet by Per Feeding Tube route 2 times daily   carBAMazepine (TEGRETOL) 100 MG chewable tablet 200 mg by Per G Tube route 4 times daily 0600, 1100, 1500, 1900. Administer 1 hour before and 1 hour after tube feeding   Potassium Chloride 40 MEQ/15ML (20%) SOLN Take 30 mEq by mouth 2 times daily   ACETAMINOPHEN  mg by Per Feeding Tube route every 4 hours as needed for pain   testosterone cypionate (DEPOTESTOTERONE CYPIONATE) 200 MG/ML injection Inject 200 mg into the muscle once a week Friday   chlorhexidine (PERIDEX) 0.12 % solution Swish and spit 15 mLs in mouth 2 times daily   levothyroxine (SYNTHROID, LEVOTHROID) 137 MCG tablet 1 tablet (137 mcg) by Per Feeding Tube route every morning (before breakfast)       ALLERGIES:   Allergies   Allergen Reactions     Dilantin [Phenytoin Sodium]        Past Medical History   Diagnosis Date     Traumatic brain injury (H) 1989     Gastro-oesophageal reflux disease      Unspecified cerebral artery occlusion with cerebral infarction 1989     Seizures (H)      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     Thyroid disease      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      Ventricular tachyarrhythmia (H)      Ventricular fibrillation (H)      Septic shock (H)      Pneumonia      UTI (urinary tract infection)      DVT of upper extremity (deep vein thrombosis) (H)      Tracheostomy care (H)      Spastic hemiplegia affecting dominant side (H)      related to wil injury     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)      Patient has little porductive speech but at baseline can understand simple commands consistently       Past Surgical History   Procedure Laterality Date     Orthopedic surgery       right hand repair  "    Head & neck surgery       reconstructive facial surgery following accident in 1989     Vascular surgery       Laparoscopic appendectomy  7/30/2013     Procedure: LAPAROSCOPIC APPENDECTOMY;  LAPAROSCOPIC APPENDECTOMY;  Surgeon: Manish Pierce MD;  Location:  OR     Esophagoscopy, gastroscopy, duodenoscopy (egd), combined  3/13/2014     Procedure: COMBINED ESOPHAGOSCOPY, GASTROSCOPY, DUODENOSCOPY (EGD), BIOPSY SINGLE OR MULTIPLE;  gastroscopy;  Surgeon: Digna Rhodes MD;  Location:  GI     Tracheostomy N/A 9/3/2016     Procedure: TRACHEOSTOMY;  Surgeon: João Ortiz MD;  Location:  OR     Laparoscopic assisted insertion tube gastrotomy N/A 9/7/2016     Procedure: LAPAROSCOPIC ASSISTED INSERTION TUBE GASTROSTOMY;  Surgeon: Manish Pierce MD;  Location:  OR     Tracheostomy N/A 12/2/2016     Procedure: TRACHEOSTOMY;  Surgeon: João Ortiz MD;  Location:  OR     Esophagoscopy, gastroscopy, duodenoscopy (egd), combined N/A 12/6/2016     Procedure: COMBINED ESOPHAGOSCOPY, GASTROSCOPY, DUODENOSCOPY (EGD);  Surgeon: Digna Rhodes MD;  Location:  GI         SOCIAL HISTORY:  Social History   Substance Use Topics     Smoking status: Former Smoker     Quit date: 04/23/1989     Smokeless tobacco: Not on file     Alcohol Use: No       FAMILY HISTORY:  No known family history of GI diseas    PHYSICAL EXAM:   /84 mmHg  Temp(Src) 99  F (37.2  C) (Rectal)  Resp 13  Ht 1.651 m (5' 5\")  Wt 54.432 kg (120 lb)  BMI 19.97 kg/m2  SpO2 98%    Constitutional: intubated, sedated  Cardiovascular: RRR, normal S1 and S2, no r/c/g/m  Respiratory: coarse airsounds  Psychiatric:  sedated  Head: Normocephalic. Atraumatic.    Neck: tach in place  Eyes:   no icterus  ENT: dry mouth  Abdomen:   Auscultation: no bowel sounds, g-tube  Appearance: moderately distended  Palpation: no pain reaction on palpation   NEURO: cannot examine  SKIN: no suspicious lesions or " luis m            ADDITIONAL COMMENTS:   I reviewed the patient's new clinical lab test results.   Recent Labs   Lab Test  01/22/17   1113  01/22/17   1003  01/22/17   0500  01/21/17   1600  12/09/16   0418  12/08/16   0410  12/05/16   1415   12/01/16   0400   WBC   --    --   24.1*   --    --   19.8*  24.6*   < >  26.1*   HGB   --    --   8.9*   --   7.7*  7.9*  8.2*   < >  8.1*   MCV   --    --   80   --    --   77*  75*   < >  76*   PLT  153   --   196  224   --   491*  356   < >  224   INR   --   2.58*  2.48*   --    --    --    --    --   1.30*    < > = values in this interval not displayed.     Recent Labs   Lab Test  01/22/17   0500  01/21/17   2348  01/21/17   1600  12/09/16   0418  12/08/16   0410   NA   --   144  144   --   139   POTASSIUM   --   4.5  4.5  4.1  3.8   CHLORIDE   --   109  111*   --   105   CO2   --   20  18*   --   28   BUN   --   22  24   --   21   CR  0.91  1.04  1.14   --   0.59*   ANIONGAP   --   15*  15*   --   6   STEVE   --   6.1*  6.8*   --   6.0*   GLC   --   202*  106*  133*  144*     Recent Labs   Lab Test  01/22/17   0500  01/21/17   1600  12/08/16   0410  12/03/16   0300   11/23/16   1555   09/04/16   0630  08/23/16   1902   ALBUMIN   --   2.1*  2.0*  1.4*   < >   --    < >   --    --    BILITOTAL   --   1.0  0.7  1.3   < >   --    < >   --    --    ALT   --   23  37  39   < >   --    < >   --    --    AST   --   21  40  75*   < >   --    < >   --    --    ALKPHOS   --   63  152*  315*   < >   --    < >   --    --    PROTEIN   --    --    --    --    --   30*   --   30*  100*   LIPASE  291  789*   --   318   < >   --    --    --    --     < > = values in this interval not displayed.       CT Abdomen 1/21/17:  1. Development of prominent complex fluid collections replacing much  of the pancreatic body and tail, along with a new fluid collection at  the pancreatic head. This is consistent with pancreatitis and acute  peripancreatic necrotic fluid collections. There is some  residual  normal enhancing pancreatic parenchyma at the neck, regions of the  head, and uncinate process.  2. Bilateral lower lobe patchy consolidation worrisome for multifocal  pneumonia.  3. Ascites in the abdomen and pelvis is of small volume and is stable  versus just slightly smaller.  4. Other fluid collections noted adjacent to the stomach related to  pancreatitis.        .    CONSULTATION ASSESSMENT AND PLAN:    Active Problems:    Pancreatitis and necrotizing pancreatitis     Assessment: Patient complex past medical history and long hospitalization 6 weeks ago including uncontrolled seizures and vfib arrest now admitted from [facility] with fever, abdominal pain, respiratory distress and CT concerning for necrotizing pancreatitis.  Unlikely biliary obstruction with normal LFTs, gallbladder reported as normal on 11/16 CT.  Unclear what triggered this but during last hospitalization CT 11/23/17 with findings of acute pancreatitis.   Triglycerides checked at that time and were normal.  Has G-J tube, nurse reports J-limb will not flush.    Plan:   - agree with plan to transfer to [facility] if able as patient may need necrosectomy, in the meantime will await results of IR aspiration of peripancreatic fluid.         Jossie Gary MD  [facility]  Office:  [phone]  Cell/Pager:  [phone]

## 2017-01-22 NOTE — PROGRESS NOTES
Patient on vasopressin and levophed to keep MAP > 65, fentanyl gtt for comfort, patient denies pain, T max of 38.9 blood cultures drawn off PICC line, arterial line placed, mother updated through the night,

## 2017-01-23 ENCOUNTER — HOSPITAL ENCOUNTER (INPATIENT)
Facility: CLINIC | Age: 57
LOS: 18 days | Discharge: NURSING, REHAB OR LONG-TERM CARE | DRG: 405 | End: 2017-02-10
Attending: INTERNAL MEDICINE | Admitting: SURGERY
Payer: MEDICARE

## 2017-01-23 VITALS
RESPIRATION RATE: 17 BRPM | WEIGHT: 163.14 LBS | DIASTOLIC BLOOD PRESSURE: 74 MMHG | SYSTOLIC BLOOD PRESSURE: 115 MMHG | BODY MASS INDEX: 27.18 KG/M2 | HEIGHT: 65 IN | OXYGEN SATURATION: 100 % | TEMPERATURE: 98.1 F

## 2017-01-23 DIAGNOSIS — E23.0 PANHYPOPITUITARISM (H): ICD-10-CM

## 2017-01-23 DIAGNOSIS — R56.9 CONVULSIONS, UNSPECIFIED CONVULSION TYPE (H): ICD-10-CM

## 2017-01-23 DIAGNOSIS — Z00.00 ROUTINE ADULT HEALTH MAINTENANCE: ICD-10-CM

## 2017-01-23 DIAGNOSIS — E46 MALNUTRITION (H): ICD-10-CM

## 2017-01-23 DIAGNOSIS — K59.00 CONSTIPATION, UNSPECIFIED CONSTIPATION TYPE: ICD-10-CM

## 2017-01-23 DIAGNOSIS — G47.00 INSOMNIA, UNSPECIFIED TYPE: Primary | ICD-10-CM

## 2017-01-23 DIAGNOSIS — Z29.9 PROPHYLACTIC MEASURE: ICD-10-CM

## 2017-01-23 DIAGNOSIS — K85.91 NECROTIZING PANCREATITIS: ICD-10-CM

## 2017-01-23 LAB
ABO + RH BLD: NORMAL
ABO + RH BLD: NORMAL
ANION GAP SERPL CALCULATED.3IONS-SCNC: 10 MMOL/L (ref 3–14)
ANION GAP SERPL CALCULATED.3IONS-SCNC: 8 MMOL/L (ref 3–14)
BACTERIA SPEC CULT: NORMAL
BLD GP AB SCN SERPL QL: NORMAL
BLD PROD TYP BPU: NORMAL
BLD PROD TYP BPU: NORMAL
BLD UNIT ID BPU: 0
BLOOD BANK CMNT PATIENT-IMP: NORMAL
BLOOD PRODUCT CODE: NORMAL
BPU ID: NORMAL
BUN SERPL-MCNC: 26 MG/DL (ref 7–30)
BUN SERPL-MCNC: 28 MG/DL (ref 7–30)
CALCIUM SERPL-MCNC: 7 MG/DL (ref 8.5–10.1)
CALCIUM SERPL-MCNC: 7.2 MG/DL (ref 8.5–10.1)
CHLORIDE SERPL-SCNC: 106 MMOL/L (ref 94–109)
CHLORIDE SERPL-SCNC: 107 MMOL/L (ref 94–109)
CO2 SERPL-SCNC: 25 MMOL/L (ref 20–32)
CO2 SERPL-SCNC: 28 MMOL/L (ref 20–32)
CREAT SERPL-MCNC: 0.81 MG/DL (ref 0.66–1.25)
CREAT SERPL-MCNC: 0.82 MG/DL (ref 0.66–1.25)
CREAT SERPL-MCNC: 0.88 MG/DL (ref 0.66–1.25)
ERYTHROCYTE [DISTWIDTH] IN BLOOD BY AUTOMATED COUNT: 19.5 % (ref 10–15)
ERYTHROCYTE [DISTWIDTH] IN BLOOD BY AUTOMATED COUNT: 20.1 % (ref 10–15)
GFR SERPL CREATININE-BSD FRML MDRD: 90 ML/MIN/1.7M2
GFR SERPL CREATININE-BSD FRML MDRD: ABNORMAL ML/MIN/1.7M2
GFR SERPL CREATININE-BSD FRML MDRD: ABNORMAL ML/MIN/1.7M2
GLUCOSE BLDC GLUCOMTR-MCNC: 108 MG/DL (ref 70–99)
GLUCOSE BLDC GLUCOMTR-MCNC: 111 MG/DL (ref 70–99)
GLUCOSE BLDC GLUCOMTR-MCNC: 113 MG/DL (ref 70–99)
GLUCOSE BLDC GLUCOMTR-MCNC: 125 MG/DL (ref 70–99)
GLUCOSE BLDC GLUCOMTR-MCNC: 128 MG/DL (ref 70–99)
GLUCOSE BLDC GLUCOMTR-MCNC: 128 MG/DL (ref 70–99)
GLUCOSE BLDC GLUCOMTR-MCNC: 132 MG/DL (ref 70–99)
GLUCOSE BLDC GLUCOMTR-MCNC: 135 MG/DL (ref 70–99)
GLUCOSE BLDC GLUCOMTR-MCNC: 146 MG/DL (ref 70–99)
GLUCOSE BLDC GLUCOMTR-MCNC: 149 MG/DL (ref 70–99)
GLUCOSE BLDC GLUCOMTR-MCNC: 92 MG/DL (ref 70–99)
GLUCOSE SERPL-MCNC: 100 MG/DL (ref 70–99)
GLUCOSE SERPL-MCNC: 141 MG/DL (ref 70–99)
HCT VFR BLD AUTO: 19.4 % (ref 40–53)
HCT VFR BLD AUTO: 23.4 % (ref 40–53)
HGB BLD-MCNC: 6 G/DL (ref 13.3–17.7)
HGB BLD-MCNC: 6 G/DL (ref 13.3–17.7)
HGB BLD-MCNC: 7.2 G/DL (ref 13.3–17.7)
INR PPP: 1.53 (ref 0.86–1.14)
LACTATE BLD-SCNC: 1.6 MMOL/L (ref 0.7–2.1)
LACTATE BLD-SCNC: 2.4 MMOL/L (ref 0.7–2.1)
LACTATE BLD-SCNC: 2.5 MMOL/L (ref 0.7–2.1)
MAGNESIUM SERPL-MCNC: 1.7 MG/DL (ref 1.6–2.3)
MAGNESIUM SERPL-MCNC: 2.3 MG/DL (ref 1.6–2.3)
MCH RBC QN AUTO: 24.1 PG (ref 26.5–33)
MCH RBC QN AUTO: 24.2 PG (ref 26.5–33)
MCHC RBC AUTO-ENTMCNC: 30.8 G/DL (ref 31.5–36.5)
MCHC RBC AUTO-ENTMCNC: 30.9 G/DL (ref 31.5–36.5)
MCV RBC AUTO: 78 FL (ref 78–100)
MCV RBC AUTO: 79 FL (ref 78–100)
MICRO REPORT STATUS: NORMAL
MRSA DNA SPEC QL NAA+PROBE: NORMAL
NUM BPU REQUESTED: 1
PHOSPHATE SERPL-MCNC: 1.9 MG/DL (ref 2.5–4.5)
PHOSPHATE SERPL-MCNC: 2 MG/DL (ref 2.5–4.5)
PLATELET # BLD AUTO: 115 10E9/L (ref 150–450)
PLATELET # BLD AUTO: 115 10E9/L (ref 150–450)
POTASSIUM SERPL-SCNC: 3 MMOL/L (ref 3.4–5.3)
POTASSIUM SERPL-SCNC: 3 MMOL/L (ref 3.4–5.3)
RBC # BLD AUTO: 2.49 10E12/L (ref 4.4–5.9)
RBC # BLD AUTO: 2.98 10E12/L (ref 4.4–5.9)
SODIUM SERPL-SCNC: 142 MMOL/L (ref 133–144)
SODIUM SERPL-SCNC: 142 MMOL/L (ref 133–144)
SPECIMEN EXP DATE BLD: NORMAL
SPECIMEN SOURCE: NORMAL
SPECIMEN SOURCE: NORMAL
TRANSFUSION STATUS PATIENT QL: NORMAL
TRANSFUSION STATUS PATIENT QL: NORMAL
VANCOMYCIN SERPL-MCNC: 17.1 MG/L
WBC # BLD AUTO: 7.3 10E9/L (ref 4–11)
WBC # BLD AUTO: 8 10E9/L (ref 4–11)

## 2017-01-23 PROCEDURE — 40000239 ZZH STATISTIC VAT ROUNDS

## 2017-01-23 PROCEDURE — 25000132 ZZH RX MED GY IP 250 OP 250 PS 637: Mod: GY | Performed by: INTERNAL MEDICINE

## 2017-01-23 PROCEDURE — 80048 BASIC METABOLIC PNL TOTAL CA: CPT | Performed by: SURGERY

## 2017-01-23 PROCEDURE — 80202 ASSAY OF VANCOMYCIN: CPT | Performed by: INTERNAL MEDICINE

## 2017-01-23 PROCEDURE — 99291 CRITICAL CARE FIRST HOUR: CPT | Mod: GC | Performed by: SURGERY

## 2017-01-23 PROCEDURE — 80048 BASIC METABOLIC PNL TOTAL CA: CPT | Performed by: INTERNAL MEDICINE

## 2017-01-23 PROCEDURE — 25000125 ZZHC RX 250: Performed by: SURGERY

## 2017-01-23 PROCEDURE — 85027 COMPLETE CBC AUTOMATED: CPT | Performed by: SURGERY

## 2017-01-23 PROCEDURE — P9047 ALBUMIN (HUMAN), 25%, 50ML: HCPCS | Performed by: PHYSICIAN ASSISTANT

## 2017-01-23 PROCEDURE — 83735 ASSAY OF MAGNESIUM: CPT | Performed by: INTERNAL MEDICINE

## 2017-01-23 PROCEDURE — 25000132 ZZH RX MED GY IP 250 OP 250 PS 637: Mod: GY | Performed by: SURGERY

## 2017-01-23 PROCEDURE — 83605 ASSAY OF LACTIC ACID: CPT | Performed by: INTERNAL MEDICINE

## 2017-01-23 PROCEDURE — P9016 RBC LEUKOCYTES REDUCED: HCPCS | Performed by: SURGERY

## 2017-01-23 PROCEDURE — 25000128 H RX IP 250 OP 636: Performed by: PHYSICIAN ASSISTANT

## 2017-01-23 PROCEDURE — 40000008 ZZH STATISTIC AIRWAY CARE

## 2017-01-23 PROCEDURE — 82565 ASSAY OF CREATININE: CPT | Performed by: INTERNAL MEDICINE

## 2017-01-23 PROCEDURE — 40000275 ZZH STATISTIC RCP TIME EA 10 MIN

## 2017-01-23 PROCEDURE — A9270 NON-COVERED ITEM OR SERVICE: HCPCS | Mod: GY | Performed by: SURGERY

## 2017-01-23 PROCEDURE — 87640 STAPH A DNA AMP PROBE: CPT | Performed by: SURGERY

## 2017-01-23 PROCEDURE — 85027 COMPLETE CBC AUTOMATED: CPT | Performed by: PHYSICIAN ASSISTANT

## 2017-01-23 PROCEDURE — 84100 ASSAY OF PHOSPHORUS: CPT | Performed by: INTERNAL MEDICINE

## 2017-01-23 PROCEDURE — A9270 NON-COVERED ITEM OR SERVICE: HCPCS | Mod: GY | Performed by: INTERNAL MEDICINE

## 2017-01-23 PROCEDURE — 94003 VENT MGMT INPAT SUBQ DAY: CPT

## 2017-01-23 PROCEDURE — 20000004 ZZH R&B ICU UMMC

## 2017-01-23 PROCEDURE — 85018 HEMOGLOBIN: CPT | Performed by: PHYSICIAN ASSISTANT

## 2017-01-23 PROCEDURE — 94002 VENT MGMT INPAT INIT DAY: CPT

## 2017-01-23 PROCEDURE — 87641 MR-STAPH DNA AMP PROBE: CPT | Performed by: SURGERY

## 2017-01-23 PROCEDURE — 99238 HOSP IP/OBS DSCHRG MGMT 30/<: CPT | Performed by: INTERNAL MEDICINE

## 2017-01-23 PROCEDURE — 25000125 ZZHC RX 250: Performed by: INTERNAL MEDICINE

## 2017-01-23 PROCEDURE — 83735 ASSAY OF MAGNESIUM: CPT | Performed by: SURGERY

## 2017-01-23 PROCEDURE — 25000128 H RX IP 250 OP 636: Performed by: INTERNAL MEDICINE

## 2017-01-23 PROCEDURE — 00000146 ZZHCL STATISTIC GLUCOSE BY METER IP

## 2017-01-23 PROCEDURE — 25000125 ZZHC RX 250: Performed by: PHYSICIAN ASSISTANT

## 2017-01-23 PROCEDURE — 3E0H76Z INTRODUCTION OF NUTRITIONAL SUBSTANCE INTO LOWER GI, VIA NATURAL OR ARTIFICIAL OPENING: ICD-10-PCS | Performed by: SURGERY

## 2017-01-23 PROCEDURE — 84100 ASSAY OF PHOSPHORUS: CPT | Performed by: SURGERY

## 2017-01-23 PROCEDURE — 85610 PROTHROMBIN TIME: CPT | Performed by: SURGERY

## 2017-01-23 PROCEDURE — 25800025 ZZH RX 258: Performed by: SURGERY

## 2017-01-23 RX ORDER — NALOXONE HYDROCHLORIDE 0.4 MG/ML
.1-.4 INJECTION, SOLUTION INTRAMUSCULAR; INTRAVENOUS; SUBCUTANEOUS
Status: DISCONTINUED | OUTPATIENT
Start: 2017-01-23 | End: 2017-02-10 | Stop reason: HOSPADM

## 2017-01-23 RX ORDER — LEVOTHYROXINE SODIUM 137 UG/1
137 TABLET ORAL
Qty: 30 TABLET | Status: ON HOLD
Start: 2017-01-23 | End: 2017-02-10

## 2017-01-23 RX ORDER — MAGNESIUM SULFATE HEPTAHYDRATE 40 MG/ML
4 INJECTION, SOLUTION INTRAVENOUS EVERY 4 HOURS PRN
Status: DISCONTINUED | OUTPATIENT
Start: 2017-01-23 | End: 2017-02-10 | Stop reason: HOSPADM

## 2017-01-23 RX ORDER — POTASSIUM CHLORIDE 7.45 MG/ML
10 INJECTION INTRAVENOUS
Status: DISCONTINUED | OUTPATIENT
Start: 2017-01-23 | End: 2017-01-23 | Stop reason: HOSPADM

## 2017-01-23 RX ORDER — LEVOTHYROXINE SODIUM 137 UG/1
137 TABLET ORAL
Status: DISCONTINUED | OUTPATIENT
Start: 2017-01-24 | End: 2017-01-27

## 2017-01-23 RX ORDER — POTASSIUM CHLORIDE 29.8 MG/ML
20 INJECTION INTRAVENOUS
Status: DISCONTINUED | OUTPATIENT
Start: 2017-01-23 | End: 2017-02-10 | Stop reason: HOSPADM

## 2017-01-23 RX ORDER — ALBUTEROL SULFATE 0.83 MG/ML
2.5 SOLUTION RESPIRATORY (INHALATION)
Status: DISCONTINUED | OUTPATIENT
Start: 2017-01-23 | End: 2017-02-10 | Stop reason: HOSPADM

## 2017-01-23 RX ORDER — NALOXONE HYDROCHLORIDE 0.4 MG/ML
.1-.4 INJECTION, SOLUTION INTRAMUSCULAR; INTRAVENOUS; SUBCUTANEOUS
Status: DISCONTINUED | OUTPATIENT
Start: 2017-01-23 | End: 2017-01-23

## 2017-01-23 RX ORDER — HYDROMORPHONE HYDROCHLORIDE 1 MG/ML
.3-.5 INJECTION, SOLUTION INTRAMUSCULAR; INTRAVENOUS; SUBCUTANEOUS
Status: DISCONTINUED | OUTPATIENT
Start: 2017-01-23 | End: 2017-02-10 | Stop reason: HOSPADM

## 2017-01-23 RX ORDER — ALBUMIN (HUMAN) 12.5 G/50ML
12.5 SOLUTION INTRAVENOUS ONCE
Status: COMPLETED | OUTPATIENT
Start: 2017-01-23 | End: 2017-01-23

## 2017-01-23 RX ORDER — ACETAMINOPHEN 325 MG/1
650 TABLET ORAL EVERY 6 HOURS
Status: DISCONTINUED | OUTPATIENT
Start: 2017-01-23 | End: 2017-01-27

## 2017-01-23 RX ORDER — MAGNESIUM SULFATE HEPTAHYDRATE 40 MG/ML
4 INJECTION, SOLUTION INTRAVENOUS EVERY 4 HOURS PRN
Status: DISCONTINUED | OUTPATIENT
Start: 2017-01-23 | End: 2017-01-23 | Stop reason: HOSPADM

## 2017-01-23 RX ORDER — POTASSIUM CHLORIDE 1.5 G/1.58G
20-40 POWDER, FOR SOLUTION ORAL
Status: DISCONTINUED | OUTPATIENT
Start: 2017-01-23 | End: 2017-02-10 | Stop reason: HOSPADM

## 2017-01-23 RX ORDER — BISACODYL 10 MG
10 SUPPOSITORY, RECTAL RECTAL DAILY
Status: DISCONTINUED | OUTPATIENT
Start: 2017-01-24 | End: 2017-02-01

## 2017-01-23 RX ORDER — HEPARIN SODIUM 5000 [USP'U]/.5ML
5000 INJECTION, SOLUTION INTRAVENOUS; SUBCUTANEOUS EVERY 8 HOURS
Status: DISCONTINUED | OUTPATIENT
Start: 2017-01-23 | End: 2017-01-27

## 2017-01-23 RX ORDER — LANOLIN ALCOHOL/MO/W.PET/CERES
6 CREAM (GRAM) TOPICAL AT BEDTIME
Status: DISCONTINUED | OUTPATIENT
Start: 2017-01-23 | End: 2017-01-27

## 2017-01-23 RX ORDER — AMOXICILLIN 250 MG
1-2 CAPSULE ORAL 2 TIMES DAILY PRN
Qty: 100 TABLET | Status: ON HOLD | DISCHARGE
Start: 2017-01-23 | End: 2017-01-25

## 2017-01-23 RX ORDER — POTASSIUM CHLORIDE 7.45 MG/ML
10 INJECTION INTRAVENOUS
Status: DISCONTINUED | OUTPATIENT
Start: 2017-01-23 | End: 2017-02-10 | Stop reason: HOSPADM

## 2017-01-23 RX ORDER — MEROPENEM 500 MG/1
500 INJECTION, POWDER, FOR SOLUTION INTRAVENOUS EVERY 6 HOURS
Qty: 60 EACH | Status: ON HOLD
Start: 2017-01-23 | End: 2017-01-27

## 2017-01-23 RX ORDER — TESTOSTERONE CYPIONATE 200 MG/ML
200 INJECTION, SOLUTION INTRAMUSCULAR WEEKLY
Status: DISCONTINUED | OUTPATIENT
Start: 2017-01-27 | End: 2017-02-10 | Stop reason: HOSPADM

## 2017-01-23 RX ORDER — SODIUM CHLORIDE 9 MG/ML
INJECTION, SOLUTION INTRAVENOUS
Status: DISCONTINUED
Start: 2017-01-23 | End: 2017-02-04 | Stop reason: HOSPADM

## 2017-01-23 RX ORDER — ONDANSETRON 4 MG/1
4 TABLET, ORALLY DISINTEGRATING ORAL EVERY 6 HOURS PRN
Status: DISCONTINUED | OUTPATIENT
Start: 2017-01-23 | End: 2017-02-10 | Stop reason: HOSPADM

## 2017-01-23 RX ORDER — CARBAMAZEPINE 100 MG/1
200 TABLET, CHEWABLE ORAL 4 TIMES DAILY
Status: DISCONTINUED | OUTPATIENT
Start: 2017-01-23 | End: 2017-01-27

## 2017-01-23 RX ORDER — POTASSIUM CHLORIDE 29.8 MG/ML
20 INJECTION INTRAVENOUS
Status: DISCONTINUED | OUTPATIENT
Start: 2017-01-23 | End: 2017-01-23 | Stop reason: HOSPADM

## 2017-01-23 RX ORDER — VANCOMYCIN HYDROCHLORIDE 1 G/200ML
1000 INJECTION, SOLUTION INTRAVENOUS EVERY 12 HOURS
Status: DISCONTINUED | OUTPATIENT
Start: 2017-01-24 | End: 2017-01-24

## 2017-01-23 RX ORDER — PROCHLORPERAZINE 25 MG
25 SUPPOSITORY, RECTAL RECTAL EVERY 12 HOURS PRN
Status: DISCONTINUED | OUTPATIENT
Start: 2017-01-23 | End: 2017-02-10 | Stop reason: HOSPADM

## 2017-01-23 RX ORDER — POTASSIUM CHLORIDE 1.5 G/1.58G
20-40 POWDER, FOR SOLUTION ORAL
Status: DISCONTINUED | OUTPATIENT
Start: 2017-01-23 | End: 2017-01-23 | Stop reason: HOSPADM

## 2017-01-23 RX ORDER — MEROPENEM 500 MG/1
500 INJECTION, POWDER, FOR SOLUTION INTRAVENOUS EVERY 6 HOURS
Status: DISCONTINUED | OUTPATIENT
Start: 2017-01-23 | End: 2017-01-24

## 2017-01-23 RX ORDER — VANCOMYCIN HYDROCHLORIDE 1 G/200ML
1000 INJECTION, SOLUTION INTRAVENOUS EVERY 12 HOURS
Qty: 4000 ML | Status: ON HOLD | DISCHARGE
Start: 2017-01-23 | End: 2017-01-25

## 2017-01-23 RX ORDER — POTASSIUM CHLORIDE 750 MG/1
20-40 TABLET, EXTENDED RELEASE ORAL
Status: DISCONTINUED | OUTPATIENT
Start: 2017-01-23 | End: 2017-02-10 | Stop reason: HOSPADM

## 2017-01-23 RX ORDER — PROCHLORPERAZINE MALEATE 5 MG
5-10 TABLET ORAL EVERY 6 HOURS PRN
Status: DISCONTINUED | OUTPATIENT
Start: 2017-01-23 | End: 2017-02-10 | Stop reason: HOSPADM

## 2017-01-23 RX ORDER — POTASSIUM CHLORIDE 1500 MG/1
20-40 TABLET, EXTENDED RELEASE ORAL
Status: DISCONTINUED | OUTPATIENT
Start: 2017-01-23 | End: 2017-01-23 | Stop reason: HOSPADM

## 2017-01-23 RX ORDER — ONDANSETRON 2 MG/ML
4 INJECTION INTRAMUSCULAR; INTRAVENOUS EVERY 6 HOURS PRN
Status: DISCONTINUED | OUTPATIENT
Start: 2017-01-23 | End: 2017-02-10 | Stop reason: HOSPADM

## 2017-01-23 RX ADMIN — HEPARIN SODIUM 5000 UNITS: 5000 INJECTION, SOLUTION INTRAVENOUS; SUBCUTANEOUS at 20:12

## 2017-01-23 RX ADMIN — Medication 81 MG: at 11:03

## 2017-01-23 RX ADMIN — Medication 40 MG: at 09:20

## 2017-01-23 RX ADMIN — MEROPENEM 500 MG: 500 INJECTION, POWDER, FOR SOLUTION INTRAVENOUS at 11:05

## 2017-01-23 RX ADMIN — POTASSIUM CHLORIDE 40 MEQ: 1.5 POWDER, FOR SOLUTION ORAL at 20:17

## 2017-01-23 RX ADMIN — ALBUMIN (HUMAN) 12.5 G: 12.5 SOLUTION INTRAVENOUS at 14:10

## 2017-01-23 RX ADMIN — CALCIUM CARBONATE 1250 MG: 1250 SUSPENSION ORAL at 13:21

## 2017-01-23 RX ADMIN — VASOPRESSIN 2.4 UNITS/HR: 20 INJECTION INTRAVENOUS at 01:43

## 2017-01-23 RX ADMIN — HYDROCORTISONE SODIUM SUCCINATE 100 MG: 100 INJECTION, POWDER, FOR SOLUTION INTRAMUSCULAR; INTRAVENOUS at 09:05

## 2017-01-23 RX ADMIN — Medication 40 MG: at 20:13

## 2017-01-23 RX ADMIN — MEROPENEM 500 MG: 500 INJECTION, POWDER, FOR SOLUTION INTRAVENOUS at 04:15

## 2017-01-23 RX ADMIN — SODIUM CHLORIDE: 9 INJECTION, SOLUTION INTRAVENOUS at 09:17

## 2017-01-23 RX ADMIN — SODIUM CHLORIDE 0.5 UNITS/HR: 9 INJECTION, SOLUTION INTRAVENOUS at 09:13

## 2017-01-23 RX ADMIN — CARBAMAZEPINE 200 MG: 100 TABLET, CHEWABLE ORAL at 05:51

## 2017-01-23 RX ADMIN — MELATONIN TAB 3 MG 6 MG: 3 TAB at 22:31

## 2017-01-23 RX ADMIN — CARBAMAZEPINE 200 MG: 100 TABLET, CHEWABLE ORAL at 20:12

## 2017-01-23 RX ADMIN — CARBAMAZEPINE 200 MG: 100 TABLET, CHEWABLE ORAL at 14:28

## 2017-01-23 RX ADMIN — CALCIUM CARBONATE 1250 MG: 1250 SUSPENSION ORAL at 11:04

## 2017-01-23 RX ADMIN — VANCOMYCIN HYDROCHLORIDE 1000 MG: 1 INJECTION, SOLUTION INTRAVENOUS at 11:37

## 2017-01-23 RX ADMIN — Medication 2 G: at 12:59

## 2017-01-23 RX ADMIN — ACETAMINOPHEN 650 MG: 325 TABLET, FILM COATED ORAL at 20:17

## 2017-01-23 RX ADMIN — HYDROMORPHONE HYDROCHLORIDE 0.5 MG: 10 INJECTION, SOLUTION INTRAMUSCULAR; INTRAVENOUS; SUBCUTANEOUS at 20:49

## 2017-01-23 RX ADMIN — BRIVARACETAM 50 MG: 50 INJECTION, SUSPENSION INTRAVENOUS at 10:44

## 2017-01-23 RX ADMIN — HYDROCORTISONE SODIUM SUCCINATE 100 MG: 100 INJECTION, POWDER, FOR SOLUTION INTRAMUSCULAR; INTRAVENOUS at 18:59

## 2017-01-23 RX ADMIN — DEXTROSE MONOHYDRATE AND SODIUM CHLORIDE: 5; .45 INJECTION, SOLUTION INTRAVENOUS at 19:04

## 2017-01-23 RX ADMIN — MEROPENEM 500 MG: 500 INJECTION, POWDER, FOR SOLUTION INTRAVENOUS at 23:36

## 2017-01-23 RX ADMIN — Medication 1 PACKET: at 11:14

## 2017-01-23 RX ADMIN — POTASSIUM CHLORIDE 20 MEQ: 29.8 INJECTION, SOLUTION INTRAVENOUS at 13:06

## 2017-01-23 RX ADMIN — HYDROMORPHONE HYDROCHLORIDE 0.5 MG: 10 INJECTION, SOLUTION INTRAMUSCULAR; INTRAVENOUS; SUBCUTANEOUS at 22:34

## 2017-01-23 RX ADMIN — SODIUM PHOSPHATE, MONOBASIC, MONOHYDRATE 20 MMOL: 276; 142 INJECTION, SOLUTION INTRAVENOUS at 05:53

## 2017-01-23 RX ADMIN — POTASSIUM CHLORIDE 20 MEQ: 1.5 POWDER, FOR SOLUTION ORAL at 22:31

## 2017-01-23 RX ADMIN — BISACODYL 10 MG: 10 SUPPOSITORY RECTAL at 09:42

## 2017-01-23 RX ADMIN — MEROPENEM 500 MG: 500 INJECTION, POWDER, FOR SOLUTION INTRAVENOUS at 18:59

## 2017-01-23 RX ADMIN — DESMOPRESSIN ACETATE 100 MCG: 0.1 TABLET ORAL at 09:20

## 2017-01-23 RX ADMIN — LEVOTHYROXINE SODIUM 137 MCG: 137 TABLET ORAL at 09:00

## 2017-01-23 RX ADMIN — CARBAMAZEPINE 200 MG: 100 TABLET, CHEWABLE ORAL at 11:01

## 2017-01-23 RX ADMIN — SODIUM PHOSPHATE, MONOBASIC, MONOHYDRATE AND SODIUM PHOSPHATE, DIBASIC, ANHYDROUS 15 MMOL: 276; 142 INJECTION, SOLUTION INTRAVENOUS at 20:52

## 2017-01-23 RX ADMIN — CHLORHEXIDINE GLUCONATE 15 ML: 1.2 RINSE ORAL at 09:47

## 2017-01-23 RX ADMIN — BRIVARACETAM 50 MG: 50 INJECTION, SUSPENSION INTRAVENOUS at 22:31

## 2017-01-23 NOTE — DISCHARGE SUMMARY
[facility]    Discharge Summary  Intensive Care Unit     Date of Admission:  1/21/2017  Date of Discharge:  1/23/2017  Discharging Provider: Shaneka Clark  Date of Service (when I saw the patient): 01/23/2017    Discharge Diagnoses  Pancreatic cyst and concern for necrosis  Septic Shock  Acute hypoxic respiratory failure     History of Present Illness  Keyon Farias is a 56 year old male with PMHx of TBI resulting in seizure disorder,spastic hemiplegia, and aphasia, v-fib cardiac arrest, panhypopituitarism and chronic trach who was admitted on 1/21/2017 from his [facility] with increasingly abdominal discomfort found to have a large pancreatic cyst and pancreatic necrosis, hypotension with lactic acidosis and respiratory failure. Transferred to the [facility] ICU for resuscitation. The patient has been maintained on full vent support and on NE and vasopressin for BP support.  IR completed a FNA of the pancreatis fluid collection. During admission, there has also been concern for intra-abdominal pressures, with bladder pressures measuring around 14, but stable.     Hospital Course  Keyon Farias was admitted on 1/21/2017.  The following problems were addressed during his hospitalization:    Neuro  1. Hx of TBI/Hx of seizure disorder/ Spastic hemiplegia, and aphasia  2. Hx of panhypopituitarism    3. Acute pain  4. Sedation  Plan:  -- Sedation with versed and fentanyl gtt  -- Tegratol and briviact    -- Cont desmopressin, synthroid, testosterone for panhypopituitarism     CV  1. Hypotension secondary to septic shock.  Plan:  -- continue to wean vasopressin. Off of NE currently   -- Continue stress dose steroids     Resp:  1. Acute on chronic hypoxic respiratory failure s/p trach. Previously weaning to trach dome at [facility], now requiring full ventilator support.  2. Concern for pneumonia. CXR with patchy opacities   Plan:  -- Current settings are: CMV/AC 14/480/5/50%. Reduced TV given elevated IAP  -- Continue  abx as below   -- PST when hemodynamically stable    GI/Nutrition  1. Necrotic/cystic pancreatitis.  CT abdomen shows prominent fluid collection replacing much of the pancreatis body and tail consistent with pancreatitis and acute memo-pancreatic necrotic fluid collections. S/p IR FNA of peripancreatic fluid collections on 1/22  2. Concern for elevated intra-abdominal pressure. Bladder pressures 14-15  3. Protein/Calorie/ Malnutrition. Patient has a G-J tube in place   4. History of linear erosive esophagitis in mid and distal esophagus seen on EGD after 2 episodes of coffee-ground emesis during admission Nov 2016  Plan:  -- NPO, will need initiation of enteral feeding through J port once transferred   -- Holding IVF given concern for elevated IAP  -- Continue to monitor for elevated  IAP with bladder pressures   -- Transfer to [facility] for further evaluation of necrotizing pancreatitis     Renal  1. Oliguria. Decrease in UOP  2. Hypokalemia/ Hypomagnesemia/ hypophosphatemia   Plan:  -- Repeat electrolytes prior to transfer, on repletion protocol   -- give one dose of 25% albumin     Endocrine  1. Stress Hyperglycemia  2. Panhypopituitarism   Plan:  --  Insulin gtt per protocol, keep BG  <180 for optimal healing  -- Continue desmopressin, synthroid, testosterone for panhypopituitarism   -- Continue stress dose steroids     Heme:  1. Acute blood loss anemia. Hgb 6.0 (from 8.9 on 1/22). No signs of active bleeding.   2. Coagulopathy  Plan:  -- Give 1 unit of PRBCs and recheck Hgb on arrival  -- Monitor hemoglobin.  -- Transfuse to keep > 7.0    ID  1. Afebrile, WBC stable at 7.3  Plan:  -- F/U blood and sputum cx  -- F/U fluid collection cultures   -- Continue empiric abx with meropenem and vanco    Shaneka Clark PA-C  Pager: [phone]    Pending Results  These results will be followed up by [facility]  Unresulted Labs Ordered in the Past 30 Days of this Admission     Date and Time Order Name Status Description    1/22/2017 1500  Anaerobic bacterial culture Preliminary     1/22/2017 1500 Abscess Culture Aerobic Bacterial In process     1/22/2017 0020 Blood culture Preliminary     1/21/2017 1549 Blood culture Preliminary     1/21/2017 1549 Blood culture Preliminary           Code Status  Full Code    Primary Care Physician  Julee Roberson    GEN:  male, resting in bed   EYES: PERRL, Anicteric sclera.   HEENT:  Normocephalic, atraumatic, trachea midline, ETT secure  CV: RRR, no gallops, rubs, or murmurs  PULM/CHEST: Diminished breath sounds in the bases, some crackles in both lung fields.    GI:  Hypoactive bowel sounds, soft, non-tender   : aaron catheter in place, urine yellow and clear  EXTREMITIES: 1+ peripheral edema, moving all extremities on R side   NEURO: diminished movement on the left side   SKIN: warm and dry  ECG- NSR on tele       Time Spent on This Encounter  Shaneka DYKES, personally saw the patient today and spent greater than 30 minutes discharging this patient.    Discharge Disposition  Transferred to intensive care ([facility])  Condition at discharge: Stable    Consultations This Hospital Stay  PHARMACY TO DOSE VANCO  GASTROENTEROLOGY IP CONSULT  SURGERY GENERAL IP CONSULT  PHARMACY IP CONSULT  VASCULAR ACCESS ADULT IP CONSULT    Discharge Orders  No discharge procedures on file.  Discharge Medications  Current Discharge Medication List      CONTINUE these medications which have NOT CHANGED    Details   calcium carbonate 1250 (500 CA) MG/5ML SUSP suspension Take 1,250 mg by mouth 3 times daily (with meals)      DESMOPRESSIN ACETATE PO Take 100 mcg by mouth daily       Hydrocortisone Sod Succinate (SOLU-CORTEF IJ) Inject 75 mg into the vein every 6 hours Ordered  but never given      PREDNISONE PO Take by mouth 2 times daily Take 20 mg every AM and 10 mg every pm     (discontinued 1/21/2017 AM)      Brivaracetam (BRIVIACT) 10 MG/ML solution Take 50 mg by mouth 2 times daily      mineral oil-hydrophilic petrolatum  (AQUAPHOR) Apply topically daily      !! Clotrimazole (DESENEX EX) Externally apply topically 2 times daily      VITAMIN D, CHOLECALCIFEROL, PO Take 2,000 Units by mouth daily      pantoprazole (PROTONIX) 2 mg/mL suspension 20 mLs (40 mg) by Per Feeding Tube route 2 times daily    Associated Diagnoses: Gastroesophageal reflux disease with esophagitis      potassium & sodium phosphates (NEUTRA-PHOS) 280-160-250 MG Packet Take 1 packet by mouth 4 times daily  Qty: 120 each    Associated Diagnoses: Nutritional deficiency      melatonin 1 MG TABS tablet Take 6 tablets (6 mg) by mouth At Bedtime    Associated Diagnoses: Encephalopathy      aspirin 10 mg/mL Take 8.1 mLs (81 mg) by mouth daily    Associated Diagnoses: Cardiac arrest (H)      Heparin Sodium, Porcine, (HEPARIN SODIUM PF) 5000 UNIT/0.5ML injection Inject 0.5 mLs (5,000 Units) Subcutaneous every 8 hours    Associated Diagnoses: Prophylactic measure      !! miconazole (MICATIN; MICRO GUARD) 2 % powder Apply topically every hour as needed for other (topical candidiasis)    Associated Diagnoses: Rash      bisacodyl (DULCOLAX) 10 MG Suppository Place 1 suppository (10 mg) rectally daily  Qty: 30 suppository    Associated Diagnoses: Constipation, unspecified constipation type      polyethylene glycol (MIRALAX/GLYCOLAX) Packet Take 17 g by mouth 2 times daily as needed (constipation)  Qty: 7 packet    Associated Diagnoses: Constipation, unspecified constipation type      protein modular (PROSTAT SUGAR FREE) 1 packet by Per Feeding Tube route 2 times daily  Qty: 900 packet    Associated Diagnoses: Nutritional deficiency      carBAMazepine (TEGRETOL) 100 MG chewable tablet 200 mg by Per G Tube route 4 times daily 0600, 1100, 1500, 1900. Administer 1 hour before and 1 hour after tube feeding      Potassium Chloride 40 MEQ/15ML (20%) SOLN Take 30 mEq by mouth 2 times daily      ACETAMINOPHEN  mg by Per Feeding Tube route every 4 hours as needed for pain       testosterone cypionate (DEPOTESTOTERONE CYPIONATE) 200 MG/ML injection Inject 200 mg into the muscle once a week Friday      chlorhexidine (PERIDEX) 0.12 % solution Swish and spit 15 mLs in mouth 2 times daily    Associated Diagnoses: Tracheostomy care (H)      levothyroxine (SYNTHROID, LEVOTHROID) 137 MCG tablet 1 tablet (137 mcg) by Per Feeding Tube route every morning (before breakfast)  Qty: 30 tablet    Associated Diagnoses: Panhypopituitarism (H)       !! - Potential duplicate medications found. Please discuss with provider.        Allergies  Allergies   Allergen Reactions     Dilantin [Phenytoin Sodium]      Data  Most Recent 3 CBC's:  Recent Labs   Lab Test  01/23/17   1015  01/22/17   1113  01/22/17   0500   12/09/16   0418  12/08/16   0410   WBC  7.3   --   24.1*   --    --   19.8*   HGB  6.0*   --   8.9*   --   7.7*  7.9*   MCV  78   --   80   --    --   77*   PLT  115*  153  196   < >   --   491*    < > = values in this interval not displayed.      Most Recent 3 BMP's:  Recent Labs   Lab Test  01/23/17   1000  01/23/17   0412  01/22/17   0500  01/21/17   2348  01/21/17   1600   NA  142   --    --   144  144   POTASSIUM  3.0*   --    --   4.5  4.5   CHLORIDE  107   --    --   109  111*   CO2  25   --    --   20  18*   BUN  28   --    --   22  24   CR  0.81  0.88  0.91  1.04  1.14   ANIONGAP  10   --    --   15*  15*   STEVE  7.0*   --    --   6.1*  6.8*   GLC  141*   --    --   202*  106*     Most Recent 2 LFT's:  Recent Labs   Lab Test  01/21/17   1600  12/08/16   0410   AST  21  40   ALT  23  37   ALKPHOS  63  152*   BILITOTAL  1.0  0.7     Most Recent INR's and Anticoagulation Dosing History:  Anticoagulation Dose History     Recent Dosing and Labs Latest Ref Rng 1/4/2016 1/6/2016 11/23/2016 11/23/2016 12/1/2016 1/22/2017 1/22/2017    INR 0.86 - 1.14 1.10 1.31(H) 1.34(H) 1.34(H) 1.30(H) 2.48(H) 2.58(H)        Most Recent 3 Troponin's:  Recent Labs   Lab Test  01/22/17   0500  01/21/17   6399   01/21/17   1600   TROPI  0.017  0.025  0.028     Most Recent Cholesterol Panel:  Recent Labs   Lab Test  11/29/16   0430   TRIG  100     Most Recent 6 Bacteria Isolates From Any Culture (See EPIC Reports for Culture Details):  Recent Labs   Lab Test  01/22/17   1430  01/22/17   0038  01/21/17   1608  01/21/17   1600  11/29/16   0836  11/28/16   0530   CULT  Pending  No growth after 20 hours  No growth after 2 days  No growth after 2 days  Light growth Normal jaime  Heavy growth Pseudomonas aeruginosa  *  No growth  No growth     Most Recent TSH, T4 and A1c Labs:  Recent Labs   Lab Test  12/01/16   0400   11/24/16   0405   TSH  <0.01*   --    --    T4  0.73*   < >   --    A1C   --    --   5.1    < > = values in this interval not displayed.

## 2017-01-23 NOTE — PLAN OF CARE
Problem: Goal Outcome Summary  Goal: Goal Outcome Summary  Outcome: Improving  Neuro: patient follows simple commands, answers yes/no questions.  Hemiplegia right side.  Afebrile  CV: NSR, no ectopy noted.  MAP of 65 maintained with vasopressin drip, levophed drip weaned off overnight.   Pulm: full vent support.  Coarse lung sounds with diminished bases-intermittent inspiratory wheezes bilaterally.  GI/: adequate urine output.  NPO. Gtube to gravity drainage-150mL out overnight.

## 2017-01-23 NOTE — IP AVS SNAPSHOT
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 Care 10/31/16 1236    Primary Care MD Name  New: Dr Julee Roberson 01/06/16 1503   / CAREGIVER      LIVING ENVIRONMENT     Filed Complexity Screen Score  12 01/27/17 0846    Lives With  parent(s) 01/29/17 1413   ABUSE RISK SCREEN      Living Arrangements  house 01/29/17 1413   QUESTION TO PATIENT:  Has a member of your family or a partner(now or in the past) intimidated, hurt, manipulated, or controlled you in any way?  patient declined to answer or is unable to answer 01/25/17 1453    Provides Primary Care For  no one, unable/limited ability to care for self 08/25/16 1135   QUESTION TO PATIENT: Do you feel safe going back to the place where you are living?  patient declined to answer or is unable to answer 01/25/17 1453    COPING/STRESS     OBSERVATION: Is there reason to believe there has been maltreatment of a vulnerable adult (ie. Physical/Sexual/Emotional abuse, self neglect, lack of adequate food, shelter, medical care, or financial exploitation)?  no 01/25/17 1453    Major Change/Loss/Stressor  hospitalization 01/25/17 1451   (R) MENTAL HEALTH SUICIDE RISK      DISCHARGE PLANNING     Are you  depressed or being treated for depression?  No 01/25/17 1455    Transportation Available  family or friend will provide 01/29/17 1413                       [address] [facility] BANK: [phone]            Medication Administration Report for Keyon Farias as of 02/10/17 1254   Legend:    Given Hold Not Given Due Canceled Entry Other Actions    Time Time (Time) Time  Time-Action       Inactive    Active    Linked        Medications 02/04/17 02/05/17 02/06/17 02/07/17 02/08/17 02/09/17 02/10/17    acetaminophen (TYLENOL) solution 650 mg  Dose: 650 mg Freq: EVERY 6 HOURS PRN Route: PER J TUBE  PRN Reasons: mild pain,fever  Start: 01/30/17 0635   Admin Instructions: Maximum acetaminophen dose from all sources= 75 mg/kg/day not to exceed 4 grams/day.        1132-Auto Hold       1603-Unhold              albuterol neb solution 2.5 mg  Dose: 2.5 mg Freq: EVERY 2 HOURS PRN Route: NEBULIZATION  PRN Reason: shortness of breath / dyspnea  Start: 01/23/17 1927       1132-Auto Hold       1603-Unhold              amylase-lipase-protease (CREON 24) 12465 UNITS per EC capsule 24,000-48,000 Units  Dose: 1-2 capsule Freq: EVERY 4 HOURS Route: PER J TUBE  Start: 01/24/17 1045   Admin Instructions: --If TF rate is running @ 10-30 ml/hr, administer 1 capsule q 4 hrs  --Once TF rate advances 40-60 ml/hr, increase to 2 capsule q 4 hrs.  Open capsule and empty contents into 15 ml sodium bicarb solution (see sodium bicarb order), let dissolve for about 20-30 minutes and then add this solution to the amount of TF formula hung in TF bag every 4 hrs (i.e., once TF @ goal infusion 60 ml/hr will mix 2 capsules into 240 ml of TF formula every 4 hrs).     0351 (48,000 Units)-Given       0853 (24,000 Units)-Given       1254 (24,000 Units)-Given       1704 (48,000 Units)-Given       1945 (48,000 Units)-Given        0024 (48,000 Units)-Given       0435 (48,000 Units)-Given       0854 (24,000 Units)-Given       1318 (24,000 Units)-Given       1635  (48,000 Units)-Given       1930 (48,000 Units)-Given        0045 (48,000 Units)-Given       0423 (48,000 Units)-Given       0812 (48,000 Units)-Given       1310 (48,000 Units)-Given       1726 (48,000 Units)-Given       (2230)-Not Given [C]        (0120)-Not Given              (0858)-Not Given       1132-Auto Hold       1200-Automatically Held       1600-Automatically Held       1603-Unhold       1806 (48,000 Units)-Given               0045 (24,000 Units)-Given       0510 (48,000 Units)-Given       0846 (48,000 Units)-Given       1222 (48,000 Units)-Given       1603 (48,000 Units)-Given       2200 (48,000 Units)-Given        0038 (48,000 Units)-Given [C]       0459 (24,000 Units)-Given [C]       0933 (24,000 Units)-Given       1257 (24,000 Units)-Given       1632 (48,000 Units)-Given       2050 (48,000 Units)-Given        0042 (48,000 Units)-Given       0434 (48,000 Units)-Given       0941 (24,000 Units)-Given       (1236)-Not Given       [ ] 1600       [ ] 2000          And  sodium bicarbonate tablet 325 mg  Dose: 325 mg Freq: EVERY 4 HOURS Route: PER J TUBE  Start: 01/24/17 1045   Admin Instructions: Once begin TFs, begin the following pancreatic enzyme regimen and recommend order each of the following:   (please copy and paste administration instructions into each order)  Crush 1 tablet and mix into 15 ml of warm water and use this solution to mix with Creon pancreatic enzymes. DO NOT administer directly into Jtube (to be mixed into TF formula with Creon enzyme - see Creon enzyme order)     0356 (325 mg)-Given       0850 (325 mg)-Given       1256 (325 mg)-Given       1704 (325 mg)-Given       1945 (325 mg)-Given        0024 (325 mg)-Given       0435 (325 mg)-Given       0855 (325 mg)-Given       1319 (325 mg)-Given       1636 (325 mg)-Given       1931 (325 mg)-Given        0045 (325 mg)-Given       0423 (325 mg)-Given       0812 (325 mg)-Given       1309 (325 mg)-Given       1727 (325 mg)-Given       (2217)-Not  Given [C]        (0120)-Not Given              (0858)-Not Given       1132-Auto Hold       1200-Automatically Held       1600-Automatically Held       1603-Unhold       1810 (325 mg)-Given               0045 (325 mg)-Given       0510 (325 mg)-Given       0846 (325 mg)-Given       1224 (325 mg)-Given       1602 (325 mg)-Given       2200 (325 mg)-Given        0038 (325 mg)-Given       0458 (325 mg)-Given       0938 (325 mg)-Given       1257 (325 mg)-Given       1633 (325 mg)-Given       2050 (325 mg)-Given        0042 (325 mg)-Given       0434 (325 mg)-Given       0941 (325 mg)-Given       (1237)-Not Given       [ ] 1600       [ ] 2000           aspirin suspension 81 mg  Dose: 81 mg Freq: DAILY Route: PER J TUBE  Start: 01/30/17 0800   Admin Instructions: Shake well.     0852 (81 mg)-Given        0854 (81 mg)-Given        0815 (81 mg)-Given        0901-Hold       1132-Auto Hold       1603-Unhold        0847 (81 mg)-Given        0933 (81 mg)-Given        0940 (81 mg)-Given           Brivaracetam (BRIVIACT) injection 50 mg  Dose: 50 mg Freq: 2 TIMES DAILY Route: IV  Start: 01/26/17 2000   Admin Instructions: May administer undiluted over 2- 15 minutes.       1245 (50 mg)-Given       1948 (50 mg)-Given        (1748)-Not Given       1931 (50 mg)-Given        0816 (50 mg)-Given       2220 (50 mg)-Given        1132-Auto Hold       1603-Unhold       (1733)-Not Given       2117 (50 mg)-Given        0847 (50 mg)-Given       2049 (50 mg)-Given        0938 (50 mg)-Given       2050 (50 mg)-Given        0941 (50 mg)-Given       [ ] 2000           calcium carbonate suspension 1,250 mg  Dose: 1,250 mg Freq: 3 TIMES DAILY WITH MEALS Route: PER J TUBE  Start: 01/30/17 0800   Admin Instructions: Shake Well     0850 (1,250 mg)-Given       1253 (1,250 mg)-Given       1703 (1,250 mg)-Given        0855 (1,250 mg)-Given       1319 (1,250 mg)-Given       1700 (1,250 mg)-Given        0815 (1,250 mg)-Given       1310 (1,250 mg)-Given        1728 (1,250 mg)-Given        (0859)-Not Given       1132-Auto Hold       1200-Automatically Held       1603-Unhold       1808 (1,250 mg)-Given        0846 (1,250 mg)-Given       1223 (1,250 mg)-Given       2049 (1,250 mg)-Given        0936 (1,250 mg)-Given       1258 (1,250 mg)-Given       1633 (1,250 mg)-Given               0939 (1,250 mg)-Given       1246 (1,250 mg)-Given       [ ] 1800           carBAMazepine (TEGretol) suspension 200 mg  Dose: 200 mg Freq: 4 TIMES DAILY Route: PER J TUBE  Start: 01/30/17 0800   Admin Instructions: Shake well. Dilute carbamazepine suspension 1:1 with water if administered via a feeding tube     0851 (200 mg)-Given       1257 (200 mg)-Given       1703 (200 mg)-Given       1948 (200 mg)-Given        0854 (200 mg)-Given       1319 (200 mg)-Given       1637 (200 mg)-Given       1930 (200 mg)-Given        0816 (200 mg)-Given       1310 (200 mg)-Given       1727 (200 mg)-Given       2216 (200 mg)-Given        0923 (200 mg)-Given       1132-Auto Hold       1200-Automatically Held       1600-Automatically Held       1603-Unhold       1808 (200 mg)-Given       2117 (200 mg)-Given        0847 (200 mg)-Given       1223 (200 mg)-Given       1603 (200 mg)-Given       2049 (200 mg)-Given        0934 (200 mg)-Given       1258 (200 mg)-Given       1633 (200 mg)-Given       2049 (200 mg)-Given        0940 (200 mg)-Given       1246 (200 mg)-Given       [ ] 1600       [ ] 2000           ciprofloxacin (CIPRO) infusion 400 mg  Dose: 400 mg Freq: EVERY 12 HOURS Route: IV  Indications of Use: INTRA-ABDOMINAL INFECTION  Last Dose: 400 mg (02/10/17 0352)  Start: 02/08/17 1600   End: 02/15/17 1559        1646 (400 mg)-New Bag        0459 (400 mg)-New Bag       1632 (400 mg)-New Bag        0352 (400 mg)-New Bag       [ ] 1600           desmopressin (DDAVP) tablet 100 mcg  Dose: 100 mcg Freq: EVERY 8 HOURS Route: ORAL OR FEED  Start: 02/01/17 1045    0003 (100 mcg)-Given       0847 (100 mcg)-Given        1703 (100 mcg)-Given        0207 (100 mcg)-Given       0855 (100 mcg)-Given       1700 (100 mcg)-Given        0233 (100 mcg)-Given       1309 (100 mcg)-Given       1727 (100 mcg)-Given        0117 (100 mcg)-Given       0923 (100 mcg)-Given       1132-Auto Hold       1603-Unhold       1806 (100 mcg)-Given        0103 (100 mcg)-Given       0847 (100 mcg)-Given       2048 (100 mcg)-Given        0038 (100 mcg)-Given       0937 (100 mcg)-Given       1633 (100 mcg)-Given        0042 (100 mcg)-Given       0945 (100 mcg)-Given       [ ] 1730           dextrose 10 % 1,000 mL infusion  Freq: CONTINUOUS PRN Route: IV  PRN Comment: Hypoglycemia prevention  Last Dose: Stopped (01/30/17 1736)  Start: 01/24/17 1033   Admin Instructions: For Hypoglycemia Prevention for patients on long-acting subcutaneous basal insulin (Glargine, Detemir, NPH) or continuous insulin infusion. Whenever nutrition support is held or interrupted:   1) Infuse IV D10W at nutrition support rate  2) Notify MD for further instructions               fiber modular (NUTRISOURCE FIBER) packet 1 packet  Dose: 1 packet Freq: 3 TIMES DAILY Route: PER FEEDING   Start: 02/02/17 1400   Admin Instructions: Mix each packet with 60 mL water before administering. Supplied by nutrition department.     (1148)-Not Given [C]       1703 (1 packet)-Given       2024 (1 packet)-Given        0855 (1 packet)-Given       (1627)-Not Given       1930 (1 packet)-Given        0811 (1 packet)-Given       1444 (1 packet)-Given       2229 (1 packet)-Given        (0900)-Not Given       1132-Auto Hold       1400-Automatically Held       1603-Unhold       2117 (1 packet)-Given        0846 (1 packet)-Given       1602 (1 packet)-Given       2048 (1 packet)-Given        0936 (1 packet)-Given       1302 (1 packet)-Given       2051 (1 packet)-Given        0940 (1 packet)-Given       [ ] 1400       [ ] 2000           glucose 40 % gel 15-30 g  Dose: 15-30 g Freq: EVERY 15 MIN PRN Route: PO  PRN  Reason: low blood sugar  Start: 01/28/17 0903   Admin Instructions: Give 15 g for BG 51 to 69 mg/dL IF patient is conscious and able to swallow. Give 30 g for BG less than or equal to 50 mg/dL IF patient is conscious and able to swallow. Do NOT give glucose gel via enteral tube.  IF patient has enteral tube: give apple juice 120 mL (4 oz or 15 g of CHO) via enteral tube for BG 51 to 69 mg/dL.  Give apple juice 240 mL (8 oz or 30 g of CHO) via enteral tube for BG less than or equal to 50 mg/dL.        1132-Auto Hold       1603-Unhold             Or  dextrose 50 % injection 25-50 mL  Dose: 25-50 mL Freq: EVERY 15 MIN PRN Route: IV  PRN Reason: low blood sugar  Start: 01/28/17 0903   Admin Instructions: Use if have IV access, BG less than 70 mg/dL and meet dose criteria below:  Dose if conscious and alert (or disorientated) and NPO = 25 mL  Dose if unconscious / not alert = 50 mL        1132-Auto Hold       1603-Unhold             Or  glucagon injection 1 mg  Dose: 1 mg Freq: EVERY 15 MIN PRN Route: SC  PRN Reason: low blood sugar  PRN Comment: May repeat x 1 only  Start: 01/28/17 0903   Admin Instructions: May give SQ or IM. IF BG less than or equal to 50 mg/dL and no IV access.  ONLY use glucagon IF patient has NO IV access AND is UNABLE to swallow.        1132-Auto Hold       1603-Unhold              heparin lock flush 10 UNIT/ML injection 2-5 mL  Dose: 2-5 mL Freq: ONCE PRN Route: IK  PRN Reason: line flush  PRN Comment: for locking each dormant lumen with line placement  Start: 01/25/17 1132   Admin Instructions: May repeat x 1        1132-Auto Hold       1603-Unhold              heparin lock flush 10 UNIT/ML injection 5-10 mL  Dose: 5-10 mL Freq: EVERY 1 HOUR PRN Route: IK  PRN Reason: other  PRN Comment: to lock each CVC - Open Ended (Tunneled and Non-Tunneled) dormant lumen.  Start: 01/25/17 1705   Admin Instructions: MAX: 5 mL per lumen.      1323 (5 mL)-Given [C]       1325 (5 mL)-Given [C]       1326 (5  mL)-Given [C]        0223 (5 mL)-Given        1132-Auto Hold       1603-Unhold        0637 (5 mL)-Given             heparin lock flush 10 UNIT/ML injection 5-10 mL  Dose: 5-10 mL Freq: EVERY 24 HOURS Route: IK  Start: 01/25/17 1715   Admin Instructions: To lock each CVC - Open Ended (Tunneled and Non-Tunneled) dormant lumen. Check PRN heparin flush order to see when last dose of PRN heparin was given before administering.  MAX: 5 mL per lumen..     (1708)-Not Given        1636 (5 mL)-Given        0809 (5 mL)-Given [C]       2229 (5 mL)-Given [C]        1132-Auto Hold       1603-Unhold       1807 (15 mL)-Given [C]        2048 (5 mL)-Given        (1735)-Not Given        [ ] 1715           hydrocortisone (CORTEF) suspension 10 mg  Dose: 10 mg Freq: DAILY Route: PO  Start: 02/03/17 1630   Admin Instructions: Shake well.     1707 (10 mg)-Given [C]        1637 (10 mg)-Given        1728 (10 mg)-Given        1132-Auto Hold       1600-Automatically Held       1603-Unhold       1808 (10 mg)-Given        1605 (10 mg)-Given        1633 (10 mg)-Given        [ ] 1600           hydrocortisone (CORTEF) suspension 20 mg  Dose: 20 mg Freq: DAILY Route: PER J TUBE  Start: 02/04/17 0800   Admin Instructions: Shake well.     1127 (20 mg)-Given        1123 (20 mg)-Given        0816 (20 mg)-Given        0923 (20 mg)-Given       1132-Auto Hold       1603-Unhold        0847 (20 mg)-Given        0935 (20 mg)-Given        0940 (20 mg)-Given           HYDROmorphone (PF) (DILAUDID) injection 0.3-0.5 mg  Dose: 0.3-0.5 mg Freq: EVERY 2 HOURS PRN Route: IV  PRN Reason: severe pain  Start: 01/23/17 1802   Admin Instructions: Hold while on PCA.        1132-Auto Hold       1603-Unhold              levothyroxine (SYNTHROID) suspension 150 mcg  Dose: 150 mcg Freq: EVERY MORNING BEFORE BREAKFAST Route: PER J TUBE  Start: 01/30/17 0730    0701 (150 mcg)-Given        0854 (150 mcg)-Given        0816 (150 mcg)-Given        0923 (150 mcg)-Given      "  1132-Auto Hold       1603-Unhold        0846 (150 mcg)-Given        0936 (150 mcg)-Given        0940 (150 mcg)-Given           lidocaine (LMX4) kit  Freq: EVERY 1 HOUR PRN Route: Top  PRN Reason: pain  PRN Comment: with VAD insertion or accessing implanted port.  Start: 01/30/17 0731   Admin Instructions: Do NOT give if patient has a history of allergy to any local anesthetic or any \"doreen\" product.   Apply 30 minutes prior to VAD insertion or port access.  MAX Dose:  2.5 g (  of 5 g tube)        1132-Auto Hold       1603-Unhold              lidocaine 1 % 1 mL  Dose: 1 mL Freq: EVERY 1 HOUR PRN Route: OTHER  PRN Comment: mild pain with VAD insertion or accessing implanted port  Start: 01/30/17 0731   Admin Instructions: Do NOT give if patient has a history of allergy to any local anesthetic or any \"doreen\" product. MAX dose 1 mL subcutaneous OR intradermal in divided doses.        1132-Auto Hold       1603-Unhold              magnesium sulfate 2 g in NS intermittent infusion (PharMEDium or FV Cmpd)  Dose: 2 g Freq: DAILY PRN Route: IV  PRN Reason: magnesium supplementation  Start: 01/23/17 1803   Admin Instructions: For Serum Mg++ 1.6 - 2 mg/dL  Give 2 g and recheck magnesium level next AM.      1637 (2 g)-New Bag         1132-Auto Hold       1603-Unhold              magnesium sulfate 4 g in 100 mL sterile water (premade)  Dose: 4 g Freq: EVERY 4 HOURS PRN Route: IV  PRN Reason: magnesium supplementation  Start: 01/23/17 1803   Admin Instructions: For serum Mg++ less than 1.6 mg/dL  Give 4 g and recheck magnesium level 2 hours after dose, and next AM.        1132-Auto Hold       1603-Unhold              May continue current IV fluids if patient has IV fluids infusing.  Freq: CONTINUOUS PRN Route: XX  Start: 02/07/17 1654              melatonin liquid 6 mg  Dose: 6 mg Freq: EVERY EVENING Route: PER J TUBE  Start: 01/31/17 2100 2010 (6 mg)-Given        2115 (6 mg)-Given        2217 (6 mg)-Given        1132-Auto " Hold       1603-Unhold       2117 (6 mg)-Given        2049 (6 mg)-Given        2049 (6 mg)-Given        [ ] 2100           metoclopramide (REGLAN) tablet 10 mg  Dose: 10 mg Freq: EVERY 6 HOURS PRN Route: PO  PRN Comment: nausea and vomiting  Start: 01/30/17 1508   Admin Instructions: This is Step 3 of nausea and vomiting management.  Give if nausea not resolved 15 minutes after giving prochlorperazine (COMPAZINE).  If nausea not resolved in 15-30 minutes, Notify provider.        1132-Auto Hold       1603-Unhold             Or  metoclopramide (REGLAN) injection 10 mg  Dose: 10 mg Freq: EVERY 6 HOURS PRN Route: IV  PRN Comment: nausea and vomiting  Start: 01/30/17 1508   Admin Instructions: This is Step 3 of nausea and vomiting management.  Give if nausea not resolved 15 minutes after giving prochlorperazine (COMPAZINE).  If nausea not resolved in 15-30 minutes, Notify provider.        1132-Auto Hold       1603-Unhold              miconazole (MICATIN; MICRO GUARD) 2 % powder  Freq: EVERY 1 HOUR PRN Route: Top  PRN Reason: other  PRN Comment: topical candidiasis  Start: 01/23/17 1733   Admin Instructions: Apply to affected area        1132-Auto Hold       1603-Unhold              morphine 0.1% in solosite topical gel  Freq: EVERY 4 HOURS PRN Route: TD  PRN Reason: moderate pain  Start: 02/04/17 1130   Admin Instructions: Apply to perineum as needed for pain        1132-Auto Hold       1603-Unhold              multivitamins with minerals (CERTAVITE/CEROVITE) liquid 15 mL  Dose: 15 mL Freq: DAILY Route: PER J TUBE  Start: 01/24/17 1045    0859 (15 mL)-Given        0854 (15 mL)-Given        0815 (15 mL)-Given        (0900)-Not Given       1132-Auto Hold       1603-Unhold        0846 (15 mL)-Given        0934 (15 mL)-Given        0939 (15 mL)-Given           naloxone (NARCAN) injection 0.1-0.4 mg  Dose: 0.1-0.4 mg Freq: EVERY 2 MIN PRN Route: IV  PRN Reason: opioid reversal  Start: 01/23/17 2441   Admin Instructions: For  respiratory rate LESS than or EQUAL to 8.  Partial reversal dose:  0.1 mg titrated q 2 minutes for Analgesia Side Effects Monitoring Sedation Level of 3 (frequently drowsy, arousable, drifts to sleep during conversation).Full reversal dose:  0.4 mg bolus for Analgesia Side Effects Monitoring Sedation Level of 4 (somnolent, minimal or no response to stimulation).        1132-Auto Hold       1603-Unhold              ondansetron (ZOFRAN-ODT) ODT tab 4 mg  Dose: 4 mg Freq: EVERY 6 HOURS PRN Route: PO  PRN Reason: nausea  Start: 01/23/17 1802   Admin Instructions: This is Step 1 of nausea and vomiting management.  If nausea not resolved in 15 minutes, go to Step 2 prochlorperazine (COMPAZINE). Do not push through foil backing. Peel back foil and gently remove. Place on tongue immediately. Administration with liquid unnecessary        1132-Auto Hold       1603-Unhold             Or  ondansetron (ZOFRAN) injection 4 mg  Dose: 4 mg Freq: EVERY 6 HOURS PRN Route: IV  PRN Reasons: nausea,vomiting  Start: 01/23/17 1802   Admin Instructions: This is Step 1 of nausea and vomiting management.  If nausea not resolved in 15 minutes, go to Step 2 prochlorperazine (COMPAZINE).        1132-Auto Hold       1603-Unhold              oxyCODONE (ROXICODONE) solution 5-10 mg  Dose: 5-10 mg Freq: EVERY 4 HOURS PRN Route: PER J TUBE  PRN Reason: moderate to severe pain  Start: 01/30/17 0636       1132-Auto Hold       1603-Unhold              pantoprazole (PROTONIX) 2 mg/mL suspension 40 mg  Dose: 40 mg Freq: 2 TIMES DAILY Route: PER FEEDING   Start: 01/23/17 2000    0900 (40 mg)-Given       1948 (40 mg)-Given        0853 (40 mg)-Given       1930 (40 mg)-Given        0816 (40 mg)-Given       2216 (40 mg)-Given        (0911)-Not Given       1132-Auto Hold       1603-Unhold       2117 (40 mg)-Given        0846 (40 mg)-Given       2049 (40 mg)-Given        0936 (40 mg)-Given       2051 (40 mg)-Given        0940 (40 mg)-Given       [ ] 2000            phosphorus tablet 250 mg (K PHOS NEUTRAL) per tablet 500 mg  Dose: 500 mg Freq: 2 TIMES DAILY Route: PO  Start: 02/10/17 1130   Admin Instructions: 250 mg phosphorus per tablet.           1246 (500 mg)-Given       [ ] 2000           potassium chloride (KLOR-CON) Packet 20-40 mEq  Dose: 20-40 mEq Freq: EVERY 2 HOURS PRN Route: ORAL OR FEED  PRN Reason: potassium supplementation  Start: 01/23/17 1803   Admin Instructions: Use if unable to tolerate tablets.    If Serum K+ 3.4-4.0, dose = 20 mEq x1. Recheck K+ level the next AM.  If Serum K+ 3.0-3.3, dose = 60 mEq po total dose (40 mEq x 1 followed in 2 hours by 20 mEq X1). Recheck K+ level 4 hours after dose and the next AM.  If Serum K+ 2.5-2.9, dose = 80 mEq po total dose (40 mEq Q2H x2). Recheck K+ level 4 hours after dose and the next AM.  If Serum K+ less than 2.5, See IV order.  Dissolve packet contents in 4-8 ounces of cold water or juice.     0700 (20 mEq)-Given          1132-Auto Hold       1603-Unhold        1224 (20 mEq)-Given             potassium chloride 10 mEq in 100 mL intermittent infusion  Dose: 10 mEq Freq: EVERY 1 HOUR PRN Route: IV  PRN Reason: potassium supplementation  Start: 01/23/17 1803   Admin Instructions: Infuse via PERIPHERAL LINE or CENTRAL LINE. Use for central line replacement if patient weight less than 65 kg, if patient is on TPN with high potassium content or if unit does not stock 20 mEq bags.  If Serum K+ 3.4-4.0, dose = 10 mEq/hr x2 doses. Recheck K+ level the next AM.  If Serum K+ 3.0-3.3, dose = 10 mEq/hr x4 doses (40 mEq IV total dose). Recheck K+ level 2 hours after dose and the next AM.  If Serum K+ less than 3.0, dose = 10 mEq/hr x6 doses (60 mEq IV total dose). Recheck K+ level 2 hours after dose and the next AM.        1132-Auto Hold       1603-Unhold              potassium chloride 10 mEq in 100 mL intermittent infusion with 10 mg lidocaine  Dose: 10 mEq Freq: EVERY 1 HOUR PRN Route: IV  PRN Reason: potassium  supplementation  Start: 01/23/17 1803   Admin Instructions: Infuse via PERIPHERAL LINE. Use potassium with lidocaine for pain with peripheral administration.  If Serum K+ 3.4-4.0, dose = 10 mEq/hr x2 doses. Recheck K+ level the next AM.  If Serum K+ 3.0-3.3, dose = 10 mEq/hr x4 doses (40 mEq IV total dose). Recheck K+ level 2 hours after dose and the next AM.  If Serum K+ less than 3.0, dose = 10 mEq/hr x6 doses (60 mEq IV total dose). Recheck K+ level 2 hours after dose and the next AM.        1132-Auto Hold       1603-Unhold              potassium chloride 20 mEq in 50 mL intermittent infusion  Dose: 20 mEq Freq: EVERY 1 HOUR PRN Route: IV  PRN Reason: potassium supplementation  Last Dose: 20 mEq (01/31/17 0613)  Start: 01/23/17 1803   Admin Instructions: Infuse via CENTRAL LINE Only.  May need EKG if less than 65 kg or on TPN - Max rate is 0.3 mEq/kg/hr for patients not on EKG monitoring.    If Serum K+ 3.4-4.0, dose = 20 mEq/hr x1 doses. Recheck K+ level the next AM.  If Serum K+ 3.0-3.3, dose = 20 mEq/hr x2 doses (40 mEq IV total dose).  Recheck K+ level 2 hours after dose and the next AM.  If Serum K+ less than 3.0, dose = 20 mEq/hr x3 doses (60 mEq IV total dose). Recheck K+ level 2 hours after dose and the next AM.        1132-Auto Hold       1603-Unhold              potassium chloride SA (K-DUR/KLOR-CON M) CR tablet 20-40 mEq  Dose: 20-40 mEq Freq: EVERY 2 HOURS PRN Route: PO  PRN Reason: potassium supplementation  Start: 01/23/17 1803   Admin Instructions: Use if able to take PO.   If Serum K+ 3.4-4.0, dose = 20 mEq x1. Recheck K+ level the next AM.  If Serum K+ 3.0-3.3, dose = 60 mEq po total dose (40 mEq x1 followed in 2 hours by 20 mEq x1). Recheck K+ level 4 hours after dose and the next AM.  If Serum K+ 2.5-2.9, dose = 80 mEq po total dose (40 mEq Q2H x2). Recheck K+ level 4 hours after dose and the next AM.  If Serum K+ less than 2.5, See IV order.  DO NOT CRUSH.        1132-Auto Hold        1603-Unhold              potassium phosphate 10 mmol in D5W 250 mL intermittent infusion  Dose: 10 mmol Freq: DAILY PRN Route: IV  PRN Reason: phosphorous supplementation  Last Dose: 10 mmol (01/26/17 0651)  Start: 01/25/17 0844   Admin Instructions: For serum phosphorus level 2.5-2.7  Do not infuse Phosphorus in the same line as TPN.   Give 10 mmol and recheck phosphorus level the next AM.        1132-Auto Hold       1603-Unhold              potassium phosphate 15 mmol in D5W 250 mL intermittent infusion  Dose: 15 mmol Freq: DAILY PRN Route: IV  PRN Reason: phosphorous supplementation  Last Dose: 15 mmol (02/06/17 2305)  Start: 01/25/17 0844   Admin Instructions: For serum phosphorus level 2.0-2.4  Do not infuse Phosphorus in the same line as TPN.   Give 15 mmol and recheck phosphorus level next AM.     2112 (15 mmol)-New Bag [C]         2305 (15 mmol)-New Bag        1132-Auto Hold       1603-Unhold              potassium phosphate 20 mmol in D5W 250 mL intermittent infusion  Dose: 20 mmol Freq: EVERY 6 HOURS PRN Route: IV  PRN Reason: phosphorous supplementation  Last Dose: 20 mmol (01/25/17 1126)  Start: 01/25/17 0844   Admin Instructions: For serum phosphorus level 1.1-1.9  For CENTRAL Line ONLY  Do not infuse Phosphorus in the same line as TPN.   Give 20 mmol and recheck phosphorus level 2 hours after dose and next AM.      1931 (20 mmol)-New Bag         1132-Auto Hold       1603-Unhold              potassium phosphate 20 mmol in D5W 500 mL intermittent infusion  Dose: 20 mmol Freq: EVERY 6 HOURS PRN Route: IV  PRN Reason: phosphorous supplementation  Start: 01/25/17 0844   Admin Instructions: For serum phosphorus level 1.1-1.9  For peripheral line  Do not infuse Phosphorus in the same line as TPN.   Give 20 mmol and recheck phosphorus level 2 hours after dose and next AM. Repeat if necessary.        1132-Auto Hold       1603-Unhold              potassium phosphate 25 mmol in D5W 500 mL intermittent  infusion  Dose: 25 mmol Freq: EVERY 8 HOURS PRN Route: IV  PRN Reason: phosphorous supplementation  Start: 01/25/17 0844   Admin Instructions: For serum phosphorus level less than 1.1  Do not infuse Phosphorus in the same line as TPN.   Give 25 mmol and recheck phosphorus level 2 hours after dose and next AM.        1132-Auto Hold       1603-Unhold              potassium phosphate 3 mmole/ml oral solution 18 mmol  Dose: 18 mmol Freq: EVERY 8 HOURS SCHEDULED Route: PER J TUBE  Start: 02/01/17 1400    0701 (18 mmol)-Given       1703 (18 mmol)-Given       2112 (18 mmol)-Given        1123 (18 mmol)-Given       1319 (18 mmol)-Given       2115 (18 mmol)-Given        0815 (18 mmol)-Given       1444 (18 mmol)-Given       (2217)-Not Given [C]       2311 (18 mmol)-Given        (0900)-Not Given       1132-Auto Hold       1600-Automatically Held       1603-Unhold       1808 (18 mmol)-Given        0103 (18 mmol)-Given       0847 (18 mmol)-Given       1602 (18 mmol)-Given               0051 (18 mmol)-Given       0935 (18 mmol)-Given       1633 (18 mmol)-Given        0042 (18 mmol)-Given       0941 (18 mmol)-Given       [ ] 1600           prochlorperazine (COMPAZINE) injection 5-10 mg  Dose: 5-10 mg Freq: EVERY 6 HOURS PRN Route: IV  PRN Reasons: nausea,vomiting  Start: 01/23/17 1802   Admin Instructions: This is Step 2 of nausea and vomiting management.   If nausea not resolved in 15 minutes, give metoclopramide (REGLAN) if ordered (step 3 of nausea and vomiting management)        1132-Auto Hold       1603-Unhold             Or  prochlorperazine (COMPAZINE) tablet 5-10 mg  Dose: 5-10 mg Freq: EVERY 6 HOURS PRN Route: PO  PRN Reason: vomiting  Start: 01/23/17 1802   Admin Instructions: This is Step 2 of nausea and vomiting management.   If nausea not resolved in 15 minutes, give metoclopramide (REGLANI) if ordered (step 3 of nausea and vomiting management)        1132-Auto Hold       1603-Unhold             Or  prochlorperazine  (COMPAZINE) Suppository 25 mg  Dose: 25 mg Freq: EVERY 12 HOURS PRN Route: RE  PRN Reasons: nausea,vomiting  Start: 01/23/17 1802   Admin Instructions: This is Step 2 of nausea and vomiting management.   If nausea not resolved in 15 minutes, give metoclopramide (REGLAN) if ordered (step 3 of nausea and vomiting management)        1132-Auto Hold       1603-Unhold              QUEtiapine (SEROquel) tablet 50 mg  Dose: 50 mg Freq: 3 TIMES DAILY PRN Route: PER J TUBE  PRN Comment: agitation  Start: 01/30/17 0637       1132-Auto Hold       1603-Unhold              sodium chloride (PF) 0.9% PF flush 10-20 mL  Dose: 10-20 mL Freq: EVERY 1 HOUR PRN Route: IK  PRN Reasons: line flush,post meds or blood draw  Start: 01/25/17 1705   Admin Instructions: to flush CVC - Open Ended (Tunneled and Non-Tunneled).   10 mL post IV meds; 20 mL post blood draw.      1320 (20 mL)-Given        0223 (20 mL)-Given       0808 (30 mL)-Given [C]        1132-Auto Hold       1603-Unhold        0642 (20 mL)-Given         0636 (30 mL)-Given [C]           sodium chloride (PF) 0.9% PF flush 3 mL  Dose: 3 mL Freq: EVERY 1 MIN PRN Route: IV  PRN Reason: line flush  PRN Comment: after medication administration. For peripheral IV flush post IV meds  Start: 02/07/17 1654              sodium chloride (PF) 0.9% PF flush 3 mL  Dose: 3 mL Freq: EVERY 8 HOURS Route: IK  Start: 01/30/17 0731   Admin Instructions: And Q1H PRN, to lock peripheral IV dormant line.     1245 (3 mL)-Given       1708 (3 mL)-Given        0208 (3 mL)-Given       0856 (3 mL)-Given       1649 (3 mL)-Given        0000 (3 mL)-Given       0815 (3 mL)-Given       2313 (3 mL)-Given        0120 (3 mL)-Given       1132-Auto Hold       1600-Automatically Held       1603-Unhold       (1733)-Not Given        0112 (3 mL)-Given       1224 (3 mL)-Given       1651 (3 mL)-Given        0040 (3 mL)-Given       (1202)-Not Given       (1600)-Not Given        0043 (3 mL)-Given       [ ] 0800       [ ]  1600           sodium chloride (PF) 0.9% PF flush 3 mL  Dose: 3 mL Freq: EVERY 1 HOUR PRN Route: IK  PRN Reason: line flush  PRN Comment: for peripheral IV flush post IV meds  Start: 01/30/17 0731       1132-Auto Hold       1603-Unhold              sodium chloride (PF) 0.9% PF flush 5-50 mL  Dose: 5-50 mL Freq: ONCE PRN Route: IK  PRN Reason: line flush  PRN Comment: to flush each lumen with line placement  Start: 01/25/17 1132   Admin Instructions: May repeat x 1        1132-Auto Hold       1603-Unhold              testosterone cypionate (DEPOTESTOTERONE CYPIONATE) injection 200 mg  Dose: 200 mg Freq: WEEKLY Route: IM  Start: 01/27/17 0900       1132-Auto Hold       1603-Unhold          1249 (200 mg)-Given           zinc oxide (DESITIN) 40 % ointment  Freq: EVERY 1 HOUR PRN Route: Top  PRN Reason: irritation  PRN Comment: as needed to buttocks  Start: 02/04/17 1804   Admin Instructions: Apply to buttocks for irritation, redness     2330 ( )-Given        1701 ( )-Given         1132-Auto Hold       1603-Unhold        2255 ( )-Given            Discontinued Medications  Medications 02/04/17 02/05/17 02/06/17 02/07/17 02/08/17 02/09/17 02/10/17         Dose: 25-50 mcg Freq: EVERY 5 MIN PRN Route: IV  PRN Reason: other  PRN Comment: acute pain  Start: 02/07/17 1509   End: 02/07/17 1603   Admin Instructions: MAX cumulative dose = 250 mcg.    Use Fentanyl initially, as a short acting agent for acute pain control.  If insufficient, or a longer acting agent is needed, begin Morphine or Hydromorphone if ordered.        1603-Med Discontinued            Dose: 0.2 mg Freq: EVERY 1 MIN PRN Route: IV  PRN Reason: benzodiazepine reversal  PRN Comment: over sedation  Start: 02/07/17 1654   End: 02/08/17 0453   Admin Instructions: Give over 15 seconds. If inadequate response after 45 seconds, may repeat up to a MAX total dose of 1 mg. Continue monitoring until discharge criteria are met for a minimum of 2 hours         0453-Med  "Discontinued           Freq: PRN  Start: 02/07/17 1354   End: 02/07/17 1603       1354 (50 mL)-Given       1603-Med Discontinued            Rate: 100 mL/hr Freq: CONTINUOUS Route: IV  Start: 02/07/17 1515   End: 02/07/17 1603   Admin Instructions: Continue until IV catheter is weaned               1603-Med Discontinued            Freq: EVERY 1 HOUR PRN Route: Top  PRN Reason: pain  PRN Comment: with VAD insertion or accessing implanted port.  Start: 02/07/17 0147   End: 02/07/17 1500   Admin Instructions: Do NOT give if patient has a history of allergy to any local anesthetic or any \"doreen\" product.   Apply 30 minutes prior to VAD insertion or port access.  MAX Dose:  2.5 g (  of 5 g tube)        1500-Med Discontinued            Dose: 1 mL Freq: EVERY 1 HOUR PRN Route: OTHER  PRN Comment: mild pain with VAD insertion or accessing implanted port  Start: 02/07/17 0147   End: 02/07/17 1500   Admin Instructions: Do NOT give if patient has a history of allergy to any local anesthetic or any \"doreen\" product. MAX dose 1 mL subcutaneous OR intradermal in divided doses.        1500-Med Discontinued            Freq: CONTINUOUS PRN Route: XX  Start: 02/06/17 2322   End: 02/07/17 1500       1500-Med Discontinued            Freq: CONTINUOUS PRN Route: XX  Start: 02/06/17 2322   End: 02/07/17 1500   Admin Instructions: May take regular AM medications except those listed below        1500-Med Discontinued            Dose: 0.1-0.4 mg Freq: EVERY 2 MIN PRN Route: IV  PRN Reason: opioid reversal  Start: 02/07/17 1654   End: 02/08/17 1653   Admin Instructions: For apnea or imminent respiratory arrest: give 0.4 mg IV undiluted Q 2 minutes PRN until desired degree of reversal is obtained, stop opioid and notify provider. Continue monitoring until discharge criteria are met for a minimum of 2 hours.  For severe sedation, decrease in respiratory depth, quality or Respiratory Rate less than 8: give 0.1 mg IV Q 2 minutes x 3 doses, stop " opioid and notify provider.  Try to minimize reversal of analgesia especially in end-of-life patients.  Continue monitoring until discharge criteria are met for a minimum of 2 hours         1653-Med Discontinued           Dose: 4 mg Freq: EVERY 30 MIN PRN Route: PO  PRN Reason: nausea  Start: 02/07/17 1509   End: 02/07/17 1603   Admin Instructions: MAX total dose = 8 mg, including OR dosing. If not resolved in 15 minutes, then go to step 2 (Prochlorperazine if ordered).        1603-Med Discontinued         Or    Dose: 4 mg Freq: EVERY 30 MIN PRN Route: IV  PRN Reason: nausea  Start: 02/07/17 1509   End: 02/07/17 1603   Admin Instructions: MAX total dose = 8 mg, including OR dosing. If not resolved in 15 minutes, then go to step 2 (Prochlorperazine if ordered).        1603-Med Discontinued            Freq: CONTINUOUS PRN Route: XX  Start: 01/23/17 1801   End: 02/08/17 1507        1507-Med Discontinued           Freq: PRN  Start: 02/07/17 1353   End: 02/07/17 1603       1353 (4 mL)-Given [C]       1603-Med Discontinued            Dose: 3 mL Freq: EVERY 8 HOURS Route: IK  Start: 02/07/17 0147   End: 02/07/17 1500   Admin Instructions: And Q1H PRN, to lock peripheral IV dormant line.        (0431)-Not Given              1500-Med Discontinued            Dose: 3 mL Freq: EVERY 1 HOUR PRN Route: IK  PRN Reason: line flush  PRN Comment: for peripheral IV flush post IV meds  Start: 02/07/17 0147   End: 02/07/17 1500       1500-Med Discontinued            Dose: 3 mL Freq: EVERY 1 MIN PRN Route: IV  PRN Reason: line flush  PRN Comment: after medication administration. For peripheral IV flush post IV meds  Start: 01/30/17 1545   End: 02/08/17 1507       1132-Auto Hold       1603-Unhold        1507-Med Discontinued           Freq: PRN  Start: 02/07/17 1354   End: 02/07/17 1603       1354 (200 mL)-Given       1603-Med Discontinued       Medications 02/04/17 02/05/17 02/06/17 02/07/17 02/08/17 02/09/17 02/10/17                INTERAGENCY TRANSFER FORM - NOTES (H&P, Discharge Summary, Consults, Procedures, Therapies)   1/23/2017                       [address] [facility] [facility] BANK: [phone]               History & Physicals      H&P by Ankush Scott RN at 2/3/2017 10:04 AM     Author:  Ankush Scott RN Service:  (none) Author Type:  Care Coordinator    Filed:  2/3/2017 10:22 AM Note Time:  2/3/2017 10:04 AM Status:  Signed    :  Ankush Scott RN (Care Coordinator)             Care Coordinator Progress Note     Admission Date/Time:  1/23/2017  Attending MD:  Kenneth Obrien MD     Data  Chart reviewed, discussed with interdisciplinary team.   Patient transferred form [facility] due to Necrotizing pancreatitis and hypoxemic respiratory failure.      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  Pt transferred from [facility] with Necrotizing pancreatitis and hypoxemic respiratory failure.   Pt remain in ICU vented via trach.  Per morning report and chart review, plan for repeat EGD/necrosectomy on 2/7 with Dr. Marcus.      Plan  Anticipated Discharge Date:  TBD.  Anticipated Discharge Plan:   Transfer back to [facility] when medically stable.  CC will cont to follow plan of care.      Ankush Scott RN, BSN  4A and 4E/ ICU  Care Coordinator  Phone: [phone]  Pager: [phone]               H&P by Yoel Montgomery MD at 1/23/2017 12:33 PM     Author:  Yoel Montgomery MD Service:  Surgery Author Type:  Resident    Filed:  1/24/2017  6:52 AM Note Time:  1/23/2017 12:33 PM Status:  Attested    :  Yoel Montgomery MD (Resident) Cosigner:  Kenneth Obrien MD at 1/25/2017 12:46 PM    Attestation signed by Kenneth Obrien MD at 1/25/2017 12:46 PM        Attestation:  Physician Attestation  Kenneth DYKES, saw this patient with the resident and agree with the resident s findings and plan of care as  documented in the resident s note.      I personally reviewed vital signs, medications, labs and imaging.    Key findings:   1.  Necrotizing pancreatitis: concern for infected necrosis.  On antibiotics, GI to consult for possible internal drainage  2.  Seizures: antiseizure meds  3.  Hyperglycemia: insulin  4.  Protein calorie malnutrition: feeding tube, initiate postpyloric feeds    Evaluation and management time exclusive of procedures was 30 minutes critical care time including:  examination with the ICU team, discussion of the patient's condition with other physicians and members of the care team, reviewing all data related to the patient, and time utilizing the EMR for documentation of this patient's care.    Kenneth Obrien  Date of Service (when I saw the patient): Jan 23, 2017                          SURGICAL ICU ADMISSION NOTE  1/23/2017    PRIMARY TEAM: General surgery  PRIMARY PHYSICIAN: Dr. Lundberg    REASON FOR CRITICAL CARE ADMISSION: Necrotizing pancreatitis and hypoxemic respiratory failure   ADMITTING PHYSICIAN: Dr. Obrien    ASSESSMENT: 56yM with TBI resulting in seizure disorder,spastic hemiplegia, and aphasia, v-fib cardiac arrest, panhypopituitarism and chronic trach who presents as a transfer for necrotizing pancreatitis. Fluid collection at the tail of the pancreas was aspirated and grew enterobacter. Patient is doing much better after fluid was aspirated and on minimal vent settings.    PLAN:   Neuro/ pain/ sedation:  - Monitor neurological status. Notify the MD for any acute changes in exam.  - Anti-seizure: Carbamazepine and brivaracetam  - Panhypopituitarism: desmopressin, synthroid, testosterone     Pulmonary care:   -Wean FiO2 to keep saturation above 90%.  -Vent settings: /14/5/50%  -PST today once settled  -Will work toward wean to trach dome     Cardiovascular:    - Monitor hemodynamic status.   - Continue to wean vasopressin     GI care:   - NPO.  - Initiate tube feeds  per J tube if working. Will use J tube for meds too.     Fluids/ Electrolytes/ Nutrition:   - D5LR for IV fluid hydration  - ICU electrolyte replacement protocol  - No indication for parenteral nutrition.  - Nutrition consulted. Appreciate recs     Renal/ Fluid Balance:    - Will monitor intake and output.  - Continue aaron     Endocrine:    - Continue steroids 100mg TID. Will plan to wean tomorrow to 50 TID  - Arrived with insulin drip. Hold until tube feeds start, then resume     ID/ Antibiotics:  - Merrem and vanco for pancreatic necrosis. Will likely d/c vanco in AM.     Heme:     - Recheck cbc as patient needed 1u pRBC      Prophylaxis:    - Mechanical prophylaxis for DVT.  - Hep TID  - PPI BID     MSK:    - PT and OT consulted. Appreciate recs.     Lines/ tubes/ drains:  - galen Hernandez, PIV x2, G-J tube     Disposition:  - Surgical ICU.    Patient seen, findings and plan discussed with surgical ICU staff.    Yoel Montgomery MD  General Surgery PGY-2    - - - - - - - - - - - - - - - - - - - - - - - - - - - - - - - - - - - - - - - - - - - - - - - - - - - - - - - - - - - - - - - - - - - - - - - -     HISTORY PRESENTING ILLNESS: Taken from patient and chart as patient is vented with trach  Keyon Farias is a 56 year old male with a history of TBI resulting in seizure disorder and panhypopituitarism, CVA, and acute pancreatitis who was admitted to OSH for fever, worsening respiratory failure and hypotension. Of note, he was recently hospitalized at OSH in Nov/Dec for an extensive stay after seizure complicated by sepsis, non-ischemic vfib arrest (deferred AICD due to sepsis). At OSH, he was restarted on the ventilator from trach lamont, treated for sepsis. He was found on CT to have large pancreatic cysts with concern for necrosis. Of note, he had CT back in November that was suggestive of pancreatitis. GI and Surgery were consulted and recommended drainage of pancreatic fluid collection. IR elected to not leave a drain  and aspirated beige fluid with GPCs and GNRs. He was transferred here for further work up of pancreatic necrosis and concern for high intraabdominal pressure.    On arrival, patient complained of no pain or other symptoms. The blood pressor medication had been stopped prior to transfer and he was on minimal vent settings. His J tube was found to not be clogged. Patient was feeling well.    REVIEW OF SYSTEMS: 10 point ROS neg other than the symptoms noted above in the HPI.    PAST MEDICAL HISTORY:   Past Medical History   Diagnosis Date     Traumatic brain injury (H) 1989     Gastro-oesophageal reflux disease      Unspecified cerebral artery occlusion with cerebral infarction 1989     Seizures (H)      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     Thyroid disease      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      Ventricular tachyarrhythmia (H)      Ventricular fibrillation (H)      Septic shock (H)      Pneumonia      UTI (urinary tract infection)      DVT of upper extremity (deep vein thrombosis) (H)      Tracheostomy care (H)      Spastic hemiplegia affecting dominant side (H)      related to wil injury     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)      Patient has little porductive speech but at baseline can understand simple commands consistently       SURGICAL HISTORY:   Past Surgical History   Procedure Laterality Date     Orthopedic surgery       right hand repair     Head & neck surgery       reconstructive facial surgery following accident in 1989     Vascular surgery       Laparoscopic appendectomy  7/30/2013     Procedure: LAPAROSCOPIC APPENDECTOMY;  LAPAROSCOPIC APPENDECTOMY;  Surgeon: Manish Pierce MD;  Location:  OR     Esophagoscopy, gastroscopy, duodenoscopy (egd), combined  3/13/2014     Procedure: COMBINED ESOPHAGOSCOPY, GASTROSCOPY, DUODENOSCOPY (EGD), BIOPSY SINGLE OR MULTIPLE;  gastroscopy;  Surgeon: Digna Rhodes,  MD;  Location:  GI     Tracheostomy N/A 9/3/2016     Procedure: TRACHEOSTOMY;  Surgeon: João Ortiz MD;  Location:  OR     Laparoscopic assisted insertion tube gastrotomy N/A 9/7/2016     Procedure: LAPAROSCOPIC ASSISTED INSERTION TUBE GASTROSTOMY;  Surgeon: Manish Pierce MD;  Location:  OR     Tracheostomy N/A 12/2/2016     Procedure: TRACHEOSTOMY;  Surgeon: João Ortiz MD;  Location:  OR     Esophagoscopy, gastroscopy, duodenoscopy (egd), combined N/A 12/6/2016     Procedure: COMBINED ESOPHAGOSCOPY, GASTROSCOPY, DUODENOSCOPY (EGD);  Surgeon: Digna Rhodes MD;  Location:  GI       SOCIAL HISTORY:   Social History     Marital Status: Single     Spouse Name: N/A     Number of Children: N/A     Years of Education: N/A     Social History Main Topics     Smoking status: Former Smoker     Quit date: 04/23/1989     Smokeless tobacco: Not on file     Alcohol Use: No     Drug Use: No     Sexual Activity: No     FAMILY HISTORY: No bleeding/clotting disorders nor problems with anesthesia.    ALLERGIES:      Allergies   Allergen Reactions     Dilantin [Phenytoin Sodium]      MEDICATIONS:  Current Outpatient Prescriptions on File Prior to Encounter:  hydrocortisone sodium succinate PF (SOLU-CORTEF) 100 MG injection Inject 100 mg into the vein every 8 hours   Brivaracetam (BRIVIACT) 50 MG/5ML injection Inject 5 mLs (50 mg) into the vein 2 times daily   midazolam (VERSED) 1 MG/ML injection Inject 2-4 mLs (2-4 mg) into the vein every hour as needed for sedation   senna-docusate (SENOKOT-S;PERICOLACE) 8.6-50 MG per tablet Take 1-2 tablets by mouth 2 times daily as needed (constipation )   meropenem (MERREM) 500 MG vial Inject 500 mg into the vein every 6 hours   vancomycin (VANCOCIN) 1000 mg in dextrose 5% 200 mL PREMIX Inject 200 mLs (1,000 mg) into the vein every 12 hours   vasopressin (PITRESSIN) 40 Units in D5W 40 mL Inject 2.4 Units/hr into the vein continuous    norepinephrine (LEVOPHED) 0.064 mg/mL SOLN Inject 2.22-29.6 mcg/min into the vein continuous   levothyroxine (SYNTHROID/LEVOTHROID) 137 MCG tablet 1 tablet (137 mcg) by Per Feeding Tube route every morning (before breakfast)   pantoprazole (PROTONIX) 2 mg/mL suspension 20 mLs (40 mg) by Per Feeding Tube route 2 times daily   calcium carbonate 1250 (500 CA) MG/5ML SUSP suspension Take 1,250 mg by mouth 3 times daily (with meals)   Hydrocortisone Sod Succinate (SOLU-CORTEF IJ) Inject 75 mg into the vein every 6 hours Ordered  but never given   Brivaracetam (BRIVIACT) 10 MG/ML solution Take 50 mg by mouth 2 times daily   mineral oil-hydrophilic petrolatum (AQUAPHOR) Apply topically daily   Clotrimazole (DESENEX EX) Externally apply topically 2 times daily   melatonin 1 MG TABS tablet Take 6 tablets (6 mg) by mouth At Bedtime   aspirin 10 mg/mL Take 8.1 mLs (81 mg) by mouth daily   Heparin Sodium, Porcine, (HEPARIN SODIUM PF) 5000 UNIT/0.5ML injection Inject 0.5 mLs (5,000 Units) Subcutaneous every 8 hours   miconazole (MICATIN; MICRO GUARD) 2 % powder Apply topically every hour as needed for other (topical candidiasis)   bisacodyl (DULCOLAX) 10 MG Suppository Place 1 suppository (10 mg) rectally daily   polyethylene glycol (MIRALAX/GLYCOLAX) Packet Take 17 g by mouth 2 times daily as needed (constipation)   protein modular (PROSTAT SUGAR FREE) 1 packet by Per Feeding Tube route 2 times daily   carBAMazepine (TEGRETOL) 100 MG chewable tablet 200 mg by Per G Tube route 4 times daily 0600, 1100, 1500, 1900. Administer 1 hour before and 1 hour after tube feeding   ACETAMINOPHEN  mg by Per Feeding Tube route every 4 hours as needed for pain   testosterone cypionate (DEPOTESTOTERONE CYPIONATE) 200 MG/ML injection Inject 200 mg into the muscle once a week Friday   [DISCONTINUED] levothyroxine (SYNTHROID, LEVOTHROID) 137 MCG tablet 1 tablet (137 mcg) by Per Feeding Tube route every morning (before breakfast)        PHYSICAL EXAMINATION:  Temp:  [97.3  F (36.3  C)-99.3  F (37.4  C)] 97.9  F (36.6  C)  Heart Rate:  [] 71  Resp:  [10-26] 13  BP: ()/(51-84) 106/61 mmHg  FiO2 (%):  [50 %] 50 %  SpO2:  [95 %-100 %] 100 %    General: Awake and alert  HEENT: Trach in place  Neuro: Follows commands.  CV: RRR  Pulm: Ventilated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 CARE TEST, GLUCOSE    Specimen Information    Type Source Collected On     02/07/17 1141           Components       Value Reference Range Flag Lab   Glucose 96 70 - 99 mg/dL  170   Comment:  Dr/RN Notified            Phosphorus [[id number]]  Resulted: 02/07/17 0542, Result status: Final result    Ordering provider: Melanie Shankar MD  02/07/17 0452 Resulting lab: [facility]    Specimen Information    Type Source Collected On     02/07/17 0452          Components       Value Reference Range Flag Lab   Phosphorus 4.3 2.5 - 4.5 mg/dL  51            Urea nitrogen [[id number]]  Resulted: 02/07/17 0526, Result status: Final result    Ordering provider: Melanie Shankar MD  02/07/17 0426 Resulting lab: [facility]    Specimen Information    Type Source Collected On   Blood  02/07/17 0452          Components       Value Reference Range Flag Lab   Urea Nitrogen 20 7 - 30 mg/dL  51            Calcium [[id number]] (Abnormal)  Resulted: 02/07/17 0526, Result status: Final result    Ordering provider: Melanie Shankar MD  02/07/17 0452 Resulting lab: [facility]    Specimen Information    Type Source Collected On     02/07/17 0452          Components       Value Reference Range Flag Lab   Calcium 7.7 8.5 - 10.1 mg/dL L 51            Chloride [[id number]]  Resulted: 02/07/17 0526, Result status: Final result    Ordering provider: Melanie Shankar MD  02/07/17 0452 Resulting lab: [facility]    Specimen Information    Type Source Collected On     02/07/17 0452          Components       Value Reference Range Flag Lab   Chloride 100 94 - 109 mmol/L  51            Co2 Total [[id number]]  Resulted: 02/07/17 0526, Result status: Final result    Ordering provider: Melanie Shankar MD  02/07/17 0452 Resulting lab: [facility]    Specimen Information    Type Source Collected On     02/07/17 0452          Components       Value Reference Range Flag Lab   Carbon Dioxide 26 20 -  32 mmol/L  51            Creatinine [[id number]]  Resulted: 02/07/17 0526, Result status: Final result    Ordering provider: Melanie Shankar MD  02/07/17 0452 Resulting lab: [facility]    Specimen Information    Type Source Collected On     02/07/17 0452          Components       Value Reference Range Flag Lab   Creatinine 0.69 0.66 - 1.25 mg/dL  51   GFR Estimate -- >60 mL/min/1.7m2  51   Result:         >90  Non  GFR Calc     GFR Estimate If Black -- >60 mL/min/1.7m2  51   Result:         >90   GFR Calc              Glucose [[id number]]  Resulted: 02/07/17 0526, Result status: Final result    Ordering provider: Melanie Shankar MD  02/07/17 0452 Resulting lab: [facility]    Specimen Information    Type Source Collected On     02/07/17 0452          Components       Value Reference Range Flag Lab   Glucose 85 70 - 99 mg/dL  51            Potassium [[id number]]  Resulted: 02/07/17 0526, Result status: Final result    Ordering provider: Melanie Shankar MD  02/07/17 0452 Resulting lab: [facility]    Specimen Information    Type Source Collected On     02/07/17 0452          Components       Value Reference Range Flag Lab   Potassium 4.2 3.4 - 5.3 mmol/L  51            Sodium [[id number]]  Resulted: 02/07/17 0526, Result status: Final result    Ordering provider: Melanie Shankar MD  02/07/17 0452 Resulting lab: [facility]    Specimen Information    Type Source Collected On     02/07/17 0452          Components       Value Reference Range Flag Lab   Sodium 135 133 - 144 mmol/L  51            INR [[id number]] (Abnormal)  Resulted: 02/07/17 0523, Result status: Final result    Ordering provider: Melanie Shankar MD  02/07/17 0426 Resulting lab: [facility]    Specimen Information    Type Source Collected On   Blood  02/07/17  0452          Components       Value Reference Range Flag Lab   INR 1.27 0.86 - 1.14  H 51            CBC with platelets [[id number]] (Abnormal)  Resulted: 02/07/17 0504, Result status: Final result    Ordering provider: Melanie Shankar MD  02/07/17 0426 Resulting lab: [facility]    Specimen Information    Type Source Collected On   Blood  02/07/17 0452          Components       Value Reference Range Flag Lab   WBC 11.7 4.0 - 11.0 10e9/L H 51   RBC Count 3.86 4.4 - 5.9 10e12/L L 51   Hemoglobin 9.4 13.3 - 17.7 g/dL L 51   Hematocrit 31.8 40.0 - 53.0 % L 51   MCV 82 78 - 100 fl  51   MCH 24.4 26.5 - 33.0 pg L 51   MCHC 29.6 31.5 - 36.5 g/dL L 51   RDW 20.5 10.0 - 15.0 % H 51   Platelet Count 363 150 - 450 10e9/L  51            Glucose by meter [[id number]]  Resulted: 02/07/17 0256, Result status: Final result    Ordering provider: Kenneth Obrien MD  02/07/17 0242 Resulting lab: POINT OF CARE TEST, GLUCOSE    Specimen Information    Type Source Collected On     02/07/17 0242          Components       Value Reference Range Flag Lab   Glucose 97 70 - 99 mg/dL  170            Testing Performed By     Lab - Abbreviation Name Director Address Valid Date Range    51 - Unknown [facility] Unknown [address] [facility] [id number] 12/31/14 1010 - Present    75 - Unknown [facility] Unknown [address] [facility] [address] 01/15/15 1019 - Present    170 - Unknown POINT OF CARE TEST, GLUCOSE Unknown Unknown 10/31/11 1114 - Present    225 - Unknown [facility] Unknown [address] [facility] [address] 12/19/14 0954 - Present    226 - Unknown MICRO RAPID TESTING LAB Unknown [address] [facility] [address] 12/19/14 0955 - Present               Imaging Results - 3 Days (02/07/17 - 02/07/17)      XR Surgery FRANCISCA Fluoro L/T 5 Min w Stills [[id number]]  Resulted: 02/07/17 1502, Result  status: Final result    Ordering provider: Jack Marcus MD  02/07/17 1319 Performed: 02/07/17 1319 - 02/07/17 1501    Resulting lab: [facility]      Narrative:       This exam was marked as non-reportable because it will not be read by a   radiologist or a [place] non-radiologist provider.              Specimen Information    Type Source Collected On                  Testing Performed By     Lab - Abbreviation Name Director Address Valid Date Range    178 - [facility] Unknown Unknown 07/20/12 1135 - Present            Encounter-Level Documents:     There are no encounter-level documents.      Order-Level Documents:     There are no order-level documents.

## 2017-01-23 NOTE — PROGRESS NOTES
GI    Current plan to transfer the patient to the [facility] for further cares.  Will check back.    Yanely Crystal, PAC  [facility]  Office:  [phone] call if needed after 5PM  Cell:  [phone], not available after 5PM at this number

## 2017-01-23 NOTE — PROGRESS NOTES
Social Work Progress Note  Pt chart reviewed. Pt discussed in interdisciplinary rounds.     Intervention: Per [facility], pt will be transferring to the [facility] ICU 4A. Sw contacted [facility] and arranged transportation for 1400 per CM request. Per CM, pt is currently on a vent-full support, Insulin drip, and a VasoPressin drip. Sw completed PCS form, faxed, and placed in pt's chart.     Team members notified:CC    Plan:  Anticipated Discharge Placement: Transferred to the [facility].  Barriers: None identified at this time.   Follow-up plan:  No further plans to follow. Sw available should a need arise.     GILMER Mireles, [facility]  [phone] 1215

## 2017-01-23 NOTE — PROGRESS NOTES
Restraints were removed because patient demonstrated ability to cooperate, follow instructions and leave remaining therapeutic lines and tubes alone while awake, thus meeting discontinuation criteria. Mother also requested off.

## 2017-01-23 NOTE — PHARMACY-VANCOMYCIN DOSING SERVICE
Pharmacy Vancomycin Note  Date of Service 2017  Patient's  1960   56 year old, male    Indication: Sepsis  Goal Trough Level: 15-20 mg/L  Day of Therapy: 3  Current Vancomycin regimen:  1000 mg IV q12h    Current estimated CrCl = Estimated Creatinine Clearance: 98.1 mL/min (based on Cr of 0.81).    Creatinine for last 3 days  2017: 11:48 PM Creatinine 1.04 mg/dL;  4:00 PM Creatinine 1.14 mg/dL  2017:  5:00 AM Creatinine 0.91 mg/dL  2017: 10:00 AM Creatinine 0.81 mg/dL;  4:12 AM Creatinine 0.88 mg/dL    Recent Vancomycin Levels (past 3 days)  2017: 10:00 AM Vancomycin Level 17.1 mg/L    Vancomycin IV Administrations (past 72 hours)                   vancomycin (VANCOCIN) 1000 mg in dextrose 5% 200 mL PREMIX (mg) 1,000 mg New Bag 17 2331     1,000 mg New Bag  1144     1,000 mg New Bag 17 2312                Nephrotoxins and other renal medications (Future)    Start     Dose/Rate Route Frequency Ordered Stop    17 2300  vancomycin (VANCOCIN) 1000 mg in dextrose 5% 200 mL PREMIX      1,000 mg Intravenous EVERY 12 HOURS 17 22317 1845  norepinephrine (LEVOPHED) 16 mg in D5W 250 mL infusion      0.03-0.4 mcg/kg/min × 54.4 kg  1.5-20.4 mL/hr  Intravenous CONTINUOUS 17 1833      17 1845  vasopressin (PITRESSIN) 40 Units in D5W 40 mL infusion      2.4 Units/hr  2.4 mL/hr  Intravenous CONTINUOUS 17 1833               Contrast Orders - past 72 hours (72h ago through future)    Start     Dose/Rate Route Frequency Ordered Stop    17 1607  iopamidol (ISOVUE-370) solution 60 mL      60 mL Intravenous ONCE 17 1606 17 1623          Interpretation of levels and current regimen:  Trough level is  Therapeutic    Has serum creatinine changed > 50% in last 72 hours: No    Urine output:  good urine output    Renal Function: Stable    Plan:  1.  Continue Current Dose  2.  Pharmacy will check trough levels as appropriate in 1-3  Days.    3. Serum creatinine levels will be ordered daily for the first week of therapy and at least twice weekly for subsequent weeks.      Ingris [place] PharmD        .

## 2017-01-23 NOTE — PROGRESS NOTES
[facility] ICU RESPIRATORY NOTE  Date of Admission: 01/21/2017  Date of Intubation (most recent): Hx: trach  Reason for Mechanical Ventilation: Respiratory failure, septic shock  Number of Days on Mechanical Ventilation: 2  Met Criteria for Pressure Support Trial: no  Length of Pressure Support Trial: None  Reason for Stopping Pressure Support Trial:  Reason for No Pressure Support Trial: per MD    Significant Events Today: CT   Ventilation Mode: CMV/AC  FiO2 (%): 50 %  Rate Set (breaths/minute): 14 breaths/min  Tidal Volume Set (mL): 550 mL  PEEP (cm H2O): 5 cmH2O  Oxygen Concentration (%): 50 %  Resp: 13    ABG Results:     Recent Labs  Lab 01/22/17  0500 01/21/17  2348   PH 7.56* 7.47*   PCO2 22* 26*   PO2 133* 117*   HCO3 20* 19*     ETT appearance on chest x-ray: Trach site care done. Trach clean, secure and patent.    Plan:  Continue full mechanical ventilatory support.  1/22/2017  Brenda Mon
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
## 2017-01-23 NOTE — PROGRESS NOTES
Pt remains on full vent support throughout the night. No PS this shift. Trach remains in place; trach site remains clean. No other significant events. Current vent settings Ventilation Mode: CMV/AC  FiO2 (%): 50 %  Rate Set (breaths/minute): 14 breaths/min  Tidal Volume Set (mL): 550 mL  PEEP (cm H2O): 5 cmH2O  Oxygen Concentration (%): 50 %  Resp: 14  Will continue to follow.    Bartolo Pepe

## 2017-01-23 NOTE — IP AVS SNAPSHOT
[address] [address]    Phone:  [phone]                                       After Visit Summary   1/23/2017    Keyon Farias    MRN: [id number]           After Visit Summary Signature Page     I have received my discharge instructions, and my questions have been answered. I have discussed any challenges I see with this plan with the nurse or doctor.    ..........................................................................................................................................  Patient/Patient Representative Signature      ..........................................................................................................................................  Patient Representative Print Name and Relationship to Patient    ..................................................               ................................................  Date                                            Time    ..........................................................................................................................................  Reviewed by Signature/Title    ...................................................              ..............................................  Date                                                            Time

## 2017-01-23 NOTE — H&P
SURGICAL ICU ADMISSION NOTE  1/23/2017    PRIMARY TEAM: General surgery  PRIMARY PHYSICIAN: Dr. Lundberg    REASON FOR CRITICAL CARE ADMISSION: Necrotizing pancreatitis and hypoxemic respiratory failure   ADMITTING PHYSICIAN: Dr. Obrien    ASSESSMENT: 56yM with TBI resulting in seizure disorder,spastic hemiplegia, and aphasia, v-fib cardiac arrest, panhypopituitarism and chronic trach who presents as a transfer for necrotizing pancreatitis. Fluid collection at the tail of the pancreas was aspirated and grew enterobacter. Patient is doing much better after fluid was aspirated and on minimal vent settings.    PLAN:   Neuro/ pain/ sedation:  - Monitor neurological status. Notify the MD for any acute changes in exam.  - Anti-seizure: Carbamazepine and brivaracetam  - Panhypopituitarism: desmopressin, synthroid, testosterone     Pulmonary care:   -Wean FiO2 to keep saturation above 90%.  -Vent settings: /14/5/50%  -PST today once settled  -Will work toward wean to trach dome     Cardiovascular:    - Monitor hemodynamic status.   - Continue to wean vasopressin     GI care:   - NPO.  - Initiate tube feeds per J tube if working. Will use J tube for meds too.     Fluids/ Electrolytes/ Nutrition:   - D5LR for IV fluid hydration  - ICU electrolyte replacement protocol  - No indication for parenteral nutrition.  - Nutrition consulted. Appreciate recs     Renal/ Fluid Balance:    - Will monitor intake and output.  - Continue aaron     Endocrine:    - Continue steroids 100mg TID. Will plan to wean tomorrow to 50 TID  - Arrived with insulin drip. Hold until tube feeds start, then resume     ID/ Antibiotics:  - Merrem and vanco for pancreatic necrosis. Will likely d/c vanco in AM.     Heme:     - Recheck cbc as patient needed 1u pRBC      Prophylaxis:    - Mechanical prophylaxis for DVT.  - Hep TID  - PPI BID     MSK:    - PT and OT consulted. Appreciate recs.     Lines/ tubes/ drains:  - Trach, aaron, PIV x2, G-J tube      Disposition:  - Surgical ICU.    Patient seen, findings and plan discussed with surgical ICU staff.    Yoel Montgomery MD  General Surgery PGY-2    - - - - - - - - - - - - - - - - - - - - - - - - - - - - - - - - - - - - - - - - - - - - - - - - - - - - - - - - - - - - - - - - - - - - - - - -     HISTORY PRESENTING ILLNESS: Taken from patient and chart as patient is vented with trach  Keyon Farias is a 56 year old male with a history of TBI resulting in seizure disorder and panhypopituitarism, CVA, and acute pancreatitis who was admitted to OSH for fever, worsening respiratory failure and hypotension. Of note, he was recently hospitalized at OSH in Nov/Dec for an extensive stay after seizure complicated by sepsis, non-ischemic vfib arrest (deferred AICD due to sepsis). At OSH, he was restarted on the ventilator from [facility], treated for sepsis. He was found on CT to have large pancreatic cysts with concern for necrosis. Of note, he had CT back in November that was suggestive of pancreatitis. GI and Surgery were consulted and recommended drainage of pancreatic fluid collection. IR elected to not leave a drain and aspirated beige fluid with GPCs and GNRs. He was transferred here for further work up of pancreatic necrosis and concern for high intraabdominal pressure.    On arrival, patient complained of no pain or other symptoms. The blood pressor medication had been stopped prior to transfer and he was on minimal vent settings. His J tube was found to not be clogged. Patient was feeling well.    REVIEW OF SYSTEMS: 10 point ROS neg other than the symptoms noted above in the HPI.    PAST MEDICAL HISTORY:   Past Medical History   Diagnosis Date     Traumatic brain injury (H) 1989     Gastro-oesophageal reflux disease      Unspecified cerebral artery occlusion with cerebral infarction 1989     Seizures (H)      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     Thyroid disease      Panhypopituitarism  (H)      Secondary to Traumatic Brain Injury      Ventricular tachyarrhythmia (H)      Ventricular fibrillation (H)      Septic shock (H)      Pneumonia      UTI (urinary tract infection)      DVT of upper extremity (deep vein thrombosis) (H)      Tracheostomy care (H)      Spastic hemiplegia affecting dominant side (H)      related to wil injury     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)      Patient has little porductive speech but at baseline can understand simple commands consistently       SURGICAL HISTORY:   Past Surgical History   Procedure Laterality Date     Orthopedic surgery       right hand repair     Head & neck surgery       reconstructive facial surgery following accident in 1989     Vascular surgery       Laparoscopic appendectomy  7/30/2013     Procedure: LAPAROSCOPIC APPENDECTOMY;  LAPAROSCOPIC APPENDECTOMY;  Surgeon: Manish Pierce MD;  Location:  OR     Esophagoscopy, gastroscopy, duodenoscopy (egd), combined  3/13/2014     Procedure: COMBINED ESOPHAGOSCOPY, GASTROSCOPY, DUODENOSCOPY (EGD), BIOPSY SINGLE OR MULTIPLE;  gastroscopy;  Surgeon: Digna Rhodes MD;  Location:  GI     Tracheostomy N/A 9/3/2016     Procedure: TRACHEOSTOMY;  Surgeon: João Ortiz MD;  Location:  OR     Laparoscopic assisted insertion tube gastrotomy N/A 9/7/2016     Procedure: LAPAROSCOPIC ASSISTED INSERTION TUBE GASTROSTOMY;  Surgeon: Manish Pierce MD;  Location:  OR     Tracheostomy N/A 12/2/2016     Procedure: TRACHEOSTOMY;  Surgeon: João Ortiz MD;  Location:  OR     Esophagoscopy, gastroscopy, duodenoscopy (egd), combined N/A 12/6/2016     Procedure: COMBINED ESOPHAGOSCOPY, GASTROSCOPY, DUODENOSCOPY (EGD);  Surgeon: Digna Rhodes MD;  Location:  GI       SOCIAL HISTORY:   Social History     Marital Status: Single     Spouse Name: N/A     Number of Children: N/A     Years of Education: N/A     Social History Main Topics     Smoking  status: Former Smoker     Quit date: 04/23/1989     Smokeless tobacco: Not on file     Alcohol Use: No     Drug Use: No     Sexual Activity: No     FAMILY HISTORY: No bleeding/clotting disorders nor problems with anesthesia.    ALLERGIES:      Allergies   Allergen Reactions     Dilantin [Phenytoin Sodium]      MEDICATIONS:  Current Outpatient Prescriptions on File Prior to Encounter:  hydrocortisone sodium succinate PF (SOLU-CORTEF) 100 MG injection Inject 100 mg into the vein every 8 hours   Brivaracetam (BRIVIACT) 50 MG/5ML injection Inject 5 mLs (50 mg) into the vein 2 times daily   midazolam (VERSED) 1 MG/ML injection Inject 2-4 mLs (2-4 mg) into the vein every hour as needed for sedation   senna-docusate (SENOKOT-S;PERICOLACE) 8.6-50 MG per tablet Take 1-2 tablets by mouth 2 times daily as needed (constipation )   meropenem (MERREM) 500 MG vial Inject 500 mg into the vein every 6 hours   vancomycin (VANCOCIN) 1000 mg in dextrose 5% 200 mL PREMIX Inject 200 mLs (1,000 mg) into the vein every 12 hours   vasopressin (PITRESSIN) 40 Units in D5W 40 mL Inject 2.4 Units/hr into the vein continuous   norepinephrine (LEVOPHED) 0.064 mg/mL SOLN Inject 2.22-29.6 mcg/min into the vein continuous   levothyroxine (SYNTHROID/LEVOTHROID) 137 MCG tablet 1 tablet (137 mcg) by Per Feeding Tube route every morning (before breakfast)   pantoprazole (PROTONIX) 2 mg/mL suspension 20 mLs (40 mg) by Per Feeding Tube route 2 times daily   calcium carbonate 1250 (500 CA) MG/5ML SUSP suspension Take 1,250 mg by mouth 3 times daily (with meals)   Hydrocortisone Sod Succinate (SOLU-CORTEF IJ) Inject 75 mg into the vein every 6 hours Ordered  but never given   Brivaracetam (BRIVIACT) 10 MG/ML solution Take 50 mg by mouth 2 times daily   mineral oil-hydrophilic petrolatum (AQUAPHOR) Apply topically daily   Clotrimazole (DESENEX EX) Externally apply topically 2 times daily   melatonin 1 MG TABS tablet Take 6 tablets (6 mg) by mouth At Bedtime    aspirin 10 mg/mL Take 8.1 mLs (81 mg) by mouth daily   Heparin Sodium, Porcine, (HEPARIN SODIUM PF) 5000 UNIT/0.5ML injection Inject 0.5 mLs (5,000 Units) Subcutaneous every 8 hours   miconazole (MICATIN; MICRO GUARD) 2 % powder Apply topically every hour as needed for other (topical candidiasis)   bisacodyl (DULCOLAX) 10 MG Suppository Place 1 suppository (10 mg) rectally daily   polyethylene glycol (MIRALAX/GLYCOLAX) Packet Take 17 g by mouth 2 times daily as needed (constipation)   protein modular (PROSTAT SUGAR FREE) 1 packet by Per Feeding Tube route 2 times daily   carBAMazepine (TEGRETOL) 100 MG chewable tablet 200 mg by Per G Tube route 4 times daily 0600, 1100, 1500, 1900. Administer 1 hour before and 1 hour after tube feeding   ACETAMINOPHEN  mg by Per Feeding Tube route every 4 hours as needed for pain   testosterone cypionate (DEPOTESTOTERONE CYPIONATE) 200 MG/ML injection Inject 200 mg into the muscle once a week Friday   [DISCONTINUED] levothyroxine (SYNTHROID, LEVOTHROID) 137 MCG tablet 1 tablet (137 mcg) by Per Feeding Tube route every morning (before breakfast)       PHYSICAL EXAMINATION:  Temp:  [97.3  F (36.3  C)-99.3  F (37.4  C)] 97.9  F (36.6  C)  Heart Rate:  [] 71  Resp:  [10-26] 13  BP: ()/(51-84) 106/61 mmHg  FiO2 (%):  [50 %] 50 %  SpO2:  [95 %-100 %] 100 %    General: Awake and alert  HEENT: Trach in place  Neuro: Follows commands.  CV: RRR  Pulm: Ventilated  Abd: Soft; non-tender; non-distended; G-J tube in place; no peritoneal signs  Extremities: WWP    LABS: Reviewed.   Arterial Blood Gases     Recent Labs  Lab 01/22/17  0500 01/21/17  2348   PH 7.56* 7.47*   PCO2 22* 26*   PO2 133* 117*   HCO3 20* 19*     Complete Blood Count     Recent Labs  Lab 01/23/17  1130 01/23/17  1015 01/22/17  1113 01/22/17  0500 01/21/17  1600   WBC  --  7.3  --  24.1*  --    HGB 6.0* 6.0*  --  8.9*  --    PLT  --  115* 153 196 224     Basic Metabolic Panel    Recent Labs  Lab  01/23/17  1000 01/23/17  0412 01/22/17  0500 01/21/17  2348 01/21/17  1600     --   --  144 144   POTASSIUM 3.0*  --   --  4.5 4.5   CHLORIDE 107  --   --  109 111*   CO2 25  --   --  20 18*   BUN 28  --   --  22 24   CR 0.81 0.88 0.91 1.04 1.14   *  --   --  202* 106*     Liver Function Tests    Recent Labs  Lab 01/22/17  1003 01/22/17  0500 01/21/17  1600   AST  --   --  21   ALT  --   --  23   ALKPHOS  --   --  63   BILITOTAL  --   --  1.0   ALBUMIN  --   --  2.1*   INR 2.58* 2.48*  --      Pancreatic Enzymes    Recent Labs  Lab 01/22/17  0500 01/21/17  1600   LIPASE 291 789*     Coagulation Profile    Recent Labs  Lab 01/22/17  1003 01/22/17  0500   INR 2.58* 2.48*   PTT 52*  --        IMAGING:  Recent Results (from the past 24 hour(s))   CT Abdomen Peritoneum Abscess Drainage    Narrative    CT ABDOMEN PERITONEUM ABSCESS DRAIN WITH CATH PLACEMENT January 22, 2017 at 1544 hours    HISTORY: 56-year-old male with presentation of septic shock. Patient  has numerous scattered fluid collections in the upper abdomen. There  is some concern for pancreatic pseudocysts. For diagnostic purposes,  request made for sample of fluid from upper abdomen fluid collection.  Given possibility of pseudocyst, plan was not to proceed with  placement of drainage catheter at this point. This is due to possible  subsequent complications of fistulization persistent drain output with  difficulty in eventually removing the drain. Nevertheless, patient is  considerably ill and diagnostic information required to determine if  infection present. Therefore, determination was made aspiration of the  largest collection at/near the pancreatic tail.    TECHNIQUE: Patient was brought to the CT department and informed  consent obtained with patient's mother. Patient was placed in a supine  position. Skin overlying the left upper quadrant was prepped and  draped in standard sterile fashion. 1% lidocaine was used for local  anesthesia.  With CT guidance, a 19-gauge Yueh needle was advanced into  the largest fluid collection at the pancreatic tail. 100 mL of beige  fluid was aspirated. No significant residual fluid at completion. The  Yueh catheter was withdrawn. No apparent or visible complication at  completion. Patient's vital signs remained stable.    Sedation: No additional sedation administered. Patient's ICU nurse was  present throughout the procedure.  Contrast: None.  Please note the patient's vital signs were monitored and remained  stable throughout the procedure of ICU nursing staff, while in the CT  department.    FINDINGS: Noncontrast CT images were obtained throughout the upper  abdomen during placement of Yueh needle and catheter. Fluid collection  is considerably smaller by completion. Previous CT exam the day prior  demonstrates multiple small fluid collections throughout the upper  abdomen.      Impression    IMPRESSION: Successful CT-guided aspiration of largest  pancreatic/peripancreatic fluid collection at the pancreatic tail. 100  mL of beige fluid was removed and sent to the lab for evaluation.  Decision was made to not place a drain at this point given concern and  possibility for underlying pseudocyst.    EDER SINHA MD

## 2017-01-23 NOTE — PROGRESS NOTES
SP[facility]UAL [facility]  Progress Note  [facility] ICU    No family available today.  Patient sleeping.  Plan: SH will continue to be available    Lucia Carvalho  Chaplain Resident  Pager [phone]

## 2017-01-23 NOTE — PROGRESS NOTES
Care Coordination:    ALBERT Bird has requested care coordination to assist in transfer to the [facility], stating pt would require pancreatic and surgical specialties.  (This provider can be reached at pager [phone]-->provided to admission for [id number] report).    + I contacted [facility] admissions [phone] and presented the patient information  + The case will be reviewed by Dr. Obrien  + The case acceptance is approved, pt accepted to ICU unit 4A.  + Nurse-to-nurse report can be called to [phone].  + SW aware, ride will be set with ventilator/tele/insulin gtt/vasopressin drip originally 1230--changed to 1400 per MD request as 1u blood to be given (see notes)  + Care Coordinator has completed EMTALA transfer form (on chart)    Brooke Ivory RN, BSN  [facility] Care Coordinator   Mobile Phone: [phone]

## 2017-01-23 NOTE — PROVIDER NOTIFICATION
1045 A Amber PRICE notified of HGB 6, 1155 Redrawn with HGB cont 6.0. Still question dilution.  I: A Amber PRICE notified and 1 unit PRBC given.

## 2017-01-24 LAB
ANION GAP SERPL CALCULATED.3IONS-SCNC: 10 MMOL/L (ref 3–14)
BUN SERPL-MCNC: 20 MG/DL (ref 7–30)
CALCIUM SERPL-MCNC: 7.5 MG/DL (ref 8.5–10.1)
CHLORIDE SERPL-SCNC: 108 MMOL/L (ref 94–109)
CO2 SERPL-SCNC: 24 MMOL/L (ref 20–32)
CREAT SERPL-MCNC: 0.75 MG/DL (ref 0.66–1.25)
ERYTHROCYTE [DISTWIDTH] IN BLOOD BY AUTOMATED COUNT: 19.4 % (ref 10–15)
GFR SERPL CREATININE-BSD FRML MDRD: ABNORMAL ML/MIN/1.7M2
GLUCOSE BLDC GLUCOMTR-MCNC: 121 MG/DL (ref 70–99)
GLUCOSE BLDC GLUCOMTR-MCNC: 142 MG/DL (ref 70–99)
GLUCOSE BLDC GLUCOMTR-MCNC: 90 MG/DL (ref 70–99)
GLUCOSE BLDC GLUCOMTR-MCNC: 97 MG/DL (ref 70–99)
GLUCOSE SERPL-MCNC: 104 MG/DL (ref 70–99)
HCT VFR BLD AUTO: 24.9 % (ref 40–53)
HGB BLD-MCNC: 7.4 G/DL (ref 13.3–17.7)
INTERPRETATION ECG - MUSE: NORMAL
MAGNESIUM SERPL-MCNC: 2.3 MG/DL (ref 1.6–2.3)
MAGNESIUM SERPL-MCNC: 2.4 MG/DL (ref 1.6–2.3)
MAGNESIUM SERPL-MCNC: 2.5 MG/DL (ref 1.6–2.3)
MCH RBC QN AUTO: 23.5 PG (ref 26.5–33)
MCHC RBC AUTO-ENTMCNC: 29.7 G/DL (ref 31.5–36.5)
MCV RBC AUTO: 79 FL (ref 78–100)
PHOSPHATE SERPL-MCNC: 1.8 MG/DL (ref 2.5–4.5)
PHOSPHATE SERPL-MCNC: 2.3 MG/DL (ref 2.5–4.5)
PHOSPHATE SERPL-MCNC: 2.4 MG/DL (ref 2.5–4.5)
PLATELET # BLD AUTO: 117 10E9/L (ref 150–450)
POTASSIUM SERPL-SCNC: 3 MMOL/L (ref 3.4–5.3)
POTASSIUM SERPL-SCNC: 3.3 MMOL/L (ref 3.4–5.3)
POTASSIUM SERPL-SCNC: 3.3 MMOL/L (ref 3.4–5.3)
POTASSIUM SERPL-SCNC: 3.5 MMOL/L (ref 3.4–5.3)
RBC # BLD AUTO: 3.15 10E12/L (ref 4.4–5.9)
SODIUM SERPL-SCNC: 142 MMOL/L (ref 133–144)
WBC # BLD AUTO: 6.7 10E9/L (ref 4–11)

## 2017-01-24 PROCEDURE — 25000132 ZZH RX MED GY IP 250 OP 250 PS 637: Mod: GY | Performed by: SURGERY

## 2017-01-24 PROCEDURE — 99291 CRITICAL CARE FIRST HOUR: CPT | Mod: GC | Performed by: SURGERY

## 2017-01-24 PROCEDURE — 40000275 ZZH STATISTIC RCP TIME EA 10 MIN

## 2017-01-24 PROCEDURE — 25000125 ZZHC RX 250: Performed by: SURGERY

## 2017-01-24 PROCEDURE — 94003 VENT MGMT INPAT SUBQ DAY: CPT

## 2017-01-24 PROCEDURE — 83735 ASSAY OF MAGNESIUM: CPT | Performed by: SURGERY

## 2017-01-24 PROCEDURE — 25800025 ZZH RX 258

## 2017-01-24 PROCEDURE — A9270 NON-COVERED ITEM OR SERVICE: HCPCS | Mod: GY | Performed by: SURGERY

## 2017-01-24 PROCEDURE — A9270 NON-COVERED ITEM OR SERVICE: HCPCS | Mod: GY

## 2017-01-24 PROCEDURE — 27210437 ZZH NUTRITION PRODUCT SEMIELEM INTERMED LITER

## 2017-01-24 PROCEDURE — 25000132 ZZH RX MED GY IP 250 OP 250 PS 637: Mod: GY

## 2017-01-24 PROCEDURE — 85027 COMPLETE CBC AUTOMATED: CPT | Performed by: SURGERY

## 2017-01-24 PROCEDURE — 20000004 ZZH R&B ICU UMMC

## 2017-01-24 PROCEDURE — 84132 ASSAY OF SERUM POTASSIUM: CPT | Performed by: SURGERY

## 2017-01-24 PROCEDURE — 80048 BASIC METABOLIC PNL TOTAL CA: CPT | Performed by: SURGERY

## 2017-01-24 PROCEDURE — 00000146 ZZHCL STATISTIC GLUCOSE BY METER IP

## 2017-01-24 PROCEDURE — 84100 ASSAY OF PHOSPHORUS: CPT | Performed by: SURGERY

## 2017-01-24 PROCEDURE — 25000125 ZZHC RX 250

## 2017-01-24 RX ORDER — QUETIAPINE FUMARATE 50 MG/1
50 TABLET, FILM COATED ORAL 3 TIMES DAILY PRN
Status: DISCONTINUED | OUTPATIENT
Start: 2017-01-24 | End: 2017-01-30

## 2017-01-24 RX ORDER — ERTAPENEM 1 G/1
1 INJECTION, POWDER, LYOPHILIZED, FOR SOLUTION INTRAMUSCULAR; INTRAVENOUS EVERY 24 HOURS
Status: DISCONTINUED | OUTPATIENT
Start: 2017-01-24 | End: 2017-02-01

## 2017-01-24 RX ORDER — SODIUM BICARBONATE 325 MG/1
325 TABLET ORAL EVERY 4 HOURS
Status: DISCONTINUED | OUTPATIENT
Start: 2017-01-24 | End: 2017-02-10 | Stop reason: HOSPADM

## 2017-01-24 RX ADMIN — HYDROCORTISONE SODIUM SUCCINATE 100 MG: 100 INJECTION, POWDER, FOR SOLUTION INTRAMUSCULAR; INTRAVENOUS at 09:22

## 2017-01-24 RX ADMIN — ACETAMINOPHEN 650 MG: 325 TABLET, FILM COATED ORAL at 02:41

## 2017-01-24 RX ADMIN — CARBAMAZEPINE 200 MG: 100 TABLET, CHEWABLE ORAL at 16:12

## 2017-01-24 RX ADMIN — CALCIUM CARBONATE 1250 MG: 1250 SUSPENSION ORAL at 12:55

## 2017-01-24 RX ADMIN — QUETIAPINE 50 MG: 50 TABLET, FILM COATED ORAL at 20:09

## 2017-01-24 RX ADMIN — BRIVARACETAM 50 MG: 50 INJECTION, SUSPENSION INTRAVENOUS at 10:20

## 2017-01-24 RX ADMIN — Medication 15 ML: at 13:00

## 2017-01-24 RX ADMIN — MEROPENEM 500 MG: 500 INJECTION, POWDER, FOR SOLUTION INTRAVENOUS at 12:53

## 2017-01-24 RX ADMIN — HYDROCORTISONE SODIUM SUCCINATE 100 MG: 100 INJECTION, POWDER, FOR SOLUTION INTRAMUSCULAR; INTRAVENOUS at 01:40

## 2017-01-24 RX ADMIN — MEROPENEM 500 MG: 500 INJECTION, POWDER, FOR SOLUTION INTRAVENOUS at 05:16

## 2017-01-24 RX ADMIN — CALCIUM CARBONATE 1250 MG: 1250 SUSPENSION ORAL at 08:40

## 2017-01-24 RX ADMIN — VANCOMYCIN HYDROCHLORIDE 1000 MG: 1 INJECTION, SOLUTION INTRAVENOUS at 12:57

## 2017-01-24 RX ADMIN — CALCIUM CARBONATE 1250 MG: 1250 SUSPENSION ORAL at 17:35

## 2017-01-24 RX ADMIN — PANCRELIPASE 24000 UNITS: 24000; 76000; 120000 CAPSULE, DELAYED RELEASE PELLETS ORAL at 20:08

## 2017-01-24 RX ADMIN — Medication 40 MG: at 08:40

## 2017-01-24 RX ADMIN — HEPARIN SODIUM 5000 UNITS: 5000 INJECTION, SOLUTION INTRAVENOUS; SUBCUTANEOUS at 01:43

## 2017-01-24 RX ADMIN — POTASSIUM CHLORIDE 40 MEQ: 1.5 POWDER, FOR SOLUTION ORAL at 23:25

## 2017-01-24 RX ADMIN — POTASSIUM CHLORIDE 20 MEQ: 29.8 INJECTION, SOLUTION INTRAVENOUS at 01:37

## 2017-01-24 RX ADMIN — HYDROCORTISONE SODIUM SUCCINATE 50 MG: 100 INJECTION, POWDER, FOR SOLUTION INTRAMUSCULAR; INTRAVENOUS at 17:35

## 2017-01-24 RX ADMIN — SODIUM PHOSPHATE, MONOBASIC, MONOHYDRATE AND SODIUM PHOSPHATE, DIBASIC, ANHYDROUS 15 MMOL: 276; 142 INJECTION, SOLUTION INTRAVENOUS at 17:41

## 2017-01-24 RX ADMIN — SODIUM BICARBONATE 325 MG: 325 TABLET ORAL at 20:08

## 2017-01-24 RX ADMIN — HEPARIN SODIUM 5000 UNITS: 5000 INJECTION, SOLUTION INTRAVENOUS; SUBCUTANEOUS at 17:35

## 2017-01-24 RX ADMIN — CARBAMAZEPINE 200 MG: 100 TABLET, CHEWABLE ORAL at 08:41

## 2017-01-24 RX ADMIN — CARBAMAZEPINE 200 MG: 100 TABLET, CHEWABLE ORAL at 20:12

## 2017-01-24 RX ADMIN — POTASSIUM CHLORIDE 20 MEQ: 29.8 INJECTION, SOLUTION INTRAVENOUS at 17:07

## 2017-01-24 RX ADMIN — VANCOMYCIN HYDROCHLORIDE 1000 MG: 1 INJECTION, SOLUTION INTRAVENOUS at 00:30

## 2017-01-24 RX ADMIN — SODIUM CHLORIDE, POTASSIUM CHLORIDE, SODIUM LACTATE AND CALCIUM CHLORIDE: 600; 310; 30; 20 INJECTION, SOLUTION INTRAVENOUS at 11:26

## 2017-01-24 RX ADMIN — ACETAMINOPHEN 650 MG: 325 TABLET, FILM COATED ORAL at 08:40

## 2017-01-24 RX ADMIN — Medication 40 MG: at 20:12

## 2017-01-24 RX ADMIN — ACETAMINOPHEN 650 MG: 325 TABLET, FILM COATED ORAL at 20:09

## 2017-01-24 RX ADMIN — CARBAMAZEPINE 200 MG: 100 TABLET, CHEWABLE ORAL at 12:55

## 2017-01-24 RX ADMIN — SODIUM BICARBONATE 325 MG: 325 TABLET ORAL at 16:11

## 2017-01-24 RX ADMIN — POTASSIUM & SODIUM PHOSPHATES POWDER PACK 280-160-250 MG 1 PACKET: 280-160-250 PACK at 12:59

## 2017-01-24 RX ADMIN — Medication 81 MG: at 08:40

## 2017-01-24 RX ADMIN — ERTAPENEM SODIUM 1 G: 1 INJECTION, POWDER, LYOPHILIZED, FOR SOLUTION INTRAMUSCULAR; INTRAVENOUS at 16:10

## 2017-01-24 RX ADMIN — POTASSIUM & SODIUM PHOSPHATES POWDER PACK 280-160-250 MG 1 PACKET: 280-160-250 PACK at 20:09

## 2017-01-24 RX ADMIN — HEPARIN SODIUM 5000 UNITS: 5000 INJECTION, SOLUTION INTRAVENOUS; SUBCUTANEOUS at 09:22

## 2017-01-24 RX ADMIN — BRIVARACETAM 50 MG: 50 INJECTION, SUSPENSION INTRAVENOUS at 22:18

## 2017-01-24 RX ADMIN — LEVOTHYROXINE SODIUM 137 MCG: 137 TABLET ORAL at 08:40

## 2017-01-24 RX ADMIN — MELATONIN TAB 3 MG 6 MG: 3 TAB at 22:19

## 2017-01-24 RX ADMIN — ACETAMINOPHEN 650 MG: 325 TABLET, FILM COATED ORAL at 16:09

## 2017-01-24 RX ADMIN — PANCRELIPASE 24000 UNITS: 24000; 76000; 120000 CAPSULE, DELAYED RELEASE PELLETS ORAL at 16:10

## 2017-01-24 RX ADMIN — POTASSIUM CHLORIDE 20 MEQ: 29.8 INJECTION, SOLUTION INTRAVENOUS at 18:32

## 2017-01-24 RX ADMIN — POTASSIUM & SODIUM PHOSPHATES POWDER PACK 280-160-250 MG 1 PACKET: 280-160-250 PACK at 16:11

## 2017-01-24 RX ADMIN — POTASSIUM CHLORIDE 20 MEQ: 29.8 INJECTION, SOLUTION INTRAVENOUS at 06:31

## 2017-01-24 RX ADMIN — SODIUM PHOSPHATE, MONOBASIC, MONOHYDRATE AND SODIUM PHOSPHATE, DIBASIC, ANHYDROUS 20 MMOL: 276; 142 INJECTION, SOLUTION INTRAVENOUS at 06:51

## 2017-01-24 RX ADMIN — POTASSIUM CHLORIDE 20 MEQ: 29.8 INJECTION, SOLUTION INTRAVENOUS at 02:39

## 2017-01-24 NOTE — PHARMACY-VANCOMYCIN DOSING SERVICE
Pharmacy Vancomycin Initial Note  Date of Service 2017  Patient's  1960  56 year old, male    Indication: Intra-abdominal infection.     Current estimated CrCl = CrCl cannot be calculated (Unknown ideal weight.).    Creatinine for last 3 days  2017: 11:48 PM Creatinine 1.04 mg/dL;  4:00 PM Creatinine 1.14 mg/dL  2017:  5:00 AM Creatinine 0.91 mg/dL  2017:  5:20 PM Creatinine 0.82 mg/dL; 10:00 AM Creatinine 0.81 mg/dL;  4:12 AM Creatinine 0.88 mg/dL    Recent Vancomycin Level(s) for last 3 days  2017: 10:00 AM Vancomycin Level 17.1 mg/L      Vancomycin IV Administrations (past 72 hours)                   vancomycin (VANCOCIN) 1000 mg in dextrose 5% 200 mL PREMIX (mg) 1,000 mg New Bag 17 1137     1,000 mg New Bag 17 2331     1,000 mg New Bag  1144     1,000 mg New Bag 17 2312                Nephrotoxins and other renal medications (Future)    Start     Dose/Rate Route Frequency Ordered Stop    17 0000  vancomycin (VANCOCIN) 1000 mg in dextrose 5% 200 mL PREMIX      1,000 mg  over 1 Hours Intravenous EVERY 12 HOURS 17 1804            Contrast Orders - past 72 hours     None                Plan:  1.  Patient has been on vancomycin 1000 mg IV q12h since 17. Will continue vancomycin 1000 mg IV q12h due to improving serum creatinine.   2.  Goal Trough Level: 10-15 mg/L   3.  Pharmacy will check trough levels as appropriate in 1-3 Days.    4. Serum creatinine levels will be ordered a minimum of twice weekly.    5. [place] method utilized to dose vancomycin therapy: Based on previous dosing from [facility].    Brissa Petit, PharmD

## 2017-01-24 NOTE — CONSULTS
"  GASTROENTEROLOGY CONSULTATION      Date of Admission:  1/23/2017  Reason for Admission: Necrotizing pancreatitis  Date of Consult  1/24/2017   Requesting Physician:  Kenneth Obrien MD           ASSESSMENT AND RECOMMENDATIONS:   Assessment:  56 year old male with a history of TBI with resultant seizures, aphasia, right sided hemiplegia, v-fib cardiac arrest, panhypopituitarism, and chronic tracheostomy who is transferred from [facility] where he was admitted 1/21 from his LTACH with increasing abd discomfort, fevers and resp distress and was found to have necrotizing pancreatitis based on CT findings of multiple locations of walled off necrosis. He did undergo CT guided aspiration of the largest fluid collection in the pancreatic tail in which cultures grew enterobacter cloacae.     Clinically, the patient has improved, has remained afebrile since 1/22 and is currently denying abdominal pain. His PEG-J tube has been functioning since admission.     Recommendations:  Plan for EUS with cystgastrostomy placement (planned for tomorrow), possible PEG-J exchange (if it becomes dislodged)  Hold TF 6 hours prior to procedure  Cont abx for now  Continue PERT in the presence of PEG-J TF  Patient's mother in agreement with plan    Discussed with primary SICU team    Gastroenterology follow up recommendations: TBD    Thank you for involving us in this patient's care. Please do not hesitate to contact the GI service with any questions or concerns.     Pt care plan discussed with Dr. Marcus, GI staff physician.    Meliza Lofton PA-C  Therapeutic Endoscopy/Pancreaticobiliary [facility]  [facility]  Pager *[phone]  -------------------------------------------------------------------------------------------------------------------       Chief Complaint:   \"Nec panc; has anterior drain from OSH\"           History of Present Illness:   Patient seen and examined at 0930. Due to the " patient's medical history, information is mainly obtained from nursing staff, the medical chart and the patient's mother ([place]).    Keyon Farias is a 56 year old male with a PMH significant for TBI with resultant seizures, aphasia, right sided hemiplegia, v-fib cardiac arrest, panhypopituitarism, and chronic tracheostomy who is transferred from [facility] where he was admitted 1/21 from his LTACH with increasing abd discomfort, fevers and resp distress. CT of the abdomen and pelvis was completed on admission which showed development of prominent complex fluid collections replacing much of the pancreatic body and tail, along with new fluid collection at the pancreatic head, consistent with pancreatic necrosis.     Of note, the patient was hospitalized 11/23-12/9 for respiratory failure, seizures, hypotension, underwent CT scan of head, chest/abd/pelvis initially which did not show any s/s pancreatitis. Repeat CT scan of abd was completed 11/28 due to increased gastric residuals (patient has G-J tube), suggesting acute pancreatitis (fat stranding surrounding the pancreas and mild hypoechogenicity in the panc tail). There were no loculated fluid collections at that time. Etiology was thought to be related to trauma from CPR. Lipase was checked after the CT scan which was elevated to 871 but not >3xULN.        Previous Procedures:  EGD - 12/6 Dr. Rhodes ([facility])   - Moderately severe reflux esophagitis. Likley cause of bleeding. No active or recent bleeding    - No gross lesions in the stomach.    - Normal examined duodenum.            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     Thyroid disease       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      Ventricular tachyarrhythmia (H)      Ventricular fibrillation (H)      Septic shock (H)      Pneumonia      UTI (urinary tract infection)      DVT of upper extremity (deep vein thrombosis) (H)      Tracheostomy care (H)      Spastic hemiplegia affecting dominant side (H)      related to wil injury     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)      Patient has little porductive speech but at baseline can understand simple commands consistently            Past Surgical History:   Reviewed and edited as appropriate   Past Surgical History   Procedure Laterality Date     Orthopedic surgery       right hand repair     Head & neck surgery       reconstructive facial surgery following accident in 1989     Vascular surgery       Laparoscopic appendectomy  7/30/2013     Procedure: LAPAROSCOPIC APPENDECTOMY;  LAPAROSCOPIC APPENDECTOMY;  Surgeon: Manish Pierce MD;  Location:  OR     Esophagoscopy, gastroscopy, duodenoscopy (egd), combined  3/13/2014     Procedure: COMBINED ESOPHAGOSCOPY, GASTROSCOPY, DUODENOSCOPY (EGD), BIOPSY SINGLE OR MULTIPLE;  gastroscopy;  Surgeon: Digna Rohdes MD;  Location:  GI     Tracheostomy N/A 9/3/2016     Procedure: TRACHEOSTOMY;  Surgeon: João Ortiz MD;  Location:  OR     Laparoscopic assisted insertion tube gastrotomy N/A 9/7/2016     Procedure: LAPAROSCOPIC ASSISTED INSERTION TUBE GASTROSTOMY;  Surgeon: Manish Pierce MD;  Location:  OR     Tracheostomy N/A 12/2/2016     Procedure: TRACHEOSTOMY;  Surgeon: João Ortiz MD;  Location:  OR     Esophagoscopy, gastroscopy, duodenoscopy (egd), combined N/A 12/6/2016     Procedure: COMBINED ESOPHAGOSCOPY, GASTROSCOPY, DUODENOSCOPY (EGD);  Surgeon: Digna Rhodes MD;  Location:  GI              Social History:   The patient lives in [place], [place] with his mother who is his primary care taker  Employer: He is  disabled           Family History:   Unable to obtain due to mental status         Allergies:   Reviewed and edited as appropriate     Allergies   Allergen Reactions     Dilantin [Phenytoin Sodium]             Medications:     Current Facility-Administered Medications   Medication     QUEtiapine (SEROquel) tablet 50 mg     lactated ringers 1,000 mL infusion     dextrose 10 % 1,000 mL infusion     multivitamins with minerals (CERTAVITE/CEROVITE) liquid 15 mL     amylase-lipase-protease (CREON 24) 97403 UNITS per EC capsule 24,000-48,000 Units    And     sodium bicarbonate tablet 325 mg     hydrocortisone sodium succinate PF (Solu-CORTEF) injection 50 mg     potassium & sodium phosphates (NEUTRA-PHOS) Packet 1 packet     aspirin suspension 81 mg     acetaminophen (TYLENOL) tablet 650 mg     bisacodyl (DULCOLAX) Suppository 10 mg     Brivaracetam (BRIVIACT) injection 50 mg     carBAMazepine (TEGretol) chewable tablet 200 mg     calcium carbonate suspension 1,250 mg     heparin sodium PF injection 5,000 Units     levothyroxine (SYNTHROID/LEVOTHROID) tablet 137 mcg     melatonin tablet 6 mg     meropenem (MERREM) 500 mg vial to attach to  mL bag for ADULTS or 25 mL bag for PEDS     miconazole (MICATIN; MICRO GUARD) 2 % powder     pantoprazole (PROTONIX) 2 mg/mL suspension 40 mg     [START ON 1/27/2017] testosterone cypionate (DEPOTESTOTERONE CYPIONATE) injection 200 mg     vancomycin (VANCOCIN) 1000 mg in dextrose 5% 200 mL PREMIX     naloxone (NARCAN) injection 0.1-0.4 mg     albuterol neb solution 2.5 mg     Patient is already receiving anticoagulation with heparin, enoxaparin (LOVENOX), warfarin (COUMADIN)  or other anticoagulant medication     HYDROmorphone (PF) (DILAUDID) injection 0.3-0.5 mg     ondansetron (ZOFRAN-ODT) ODT tab 4 mg    Or     ondansetron (ZOFRAN) injection 4 mg     prochlorperazine (COMPAZINE) injection 5-10 mg    Or     prochlorperazine (COMPAZINE) tablet 5-10 mg    Or     prochlorperazine  (COMPAZINE) Suppository 25 mg     potassium chloride SA (K-DUR/KLOR-CON M) CR tablet 20-40 mEq     potassium chloride (KLOR-CON) Packet 20-40 mEq     potassium chloride 10 mEq in 100 mL intermittent infusion     potassium chloride 10 mEq in 100 mL intermittent infusion with 10 mg lidocaine     potassium chloride 20 mEq in 50 mL intermittent infusion     magnesium sulfate 2 g in NS intermittent infusion (PharMEDium or FV Cmpd)     magnesium sulfate 4 g in 100 mL sterile water (premade)     sodium phosphate 10 mmol in D5W intermittent infusion     sodium phosphate 15 mmol in D5W intermittent infusion     sodium phosphate 20 mmol in D5W intermittent infusion     sodium phosphate 25 mmol in D5W intermittent infusion     NaCl 0.9 % infusion             Review of Systems:   Unable to perform due to TBI         Physical Exam:   Temp: 96.9  F (36.1  C) Temp src: Axillary BP: 120/80 mmHg   Heart Rate: 90 Resp: 18 SpO2: 100 % O2 Device: Mechanical Ventilator    Wt:   Wt Readings from Last 2 Encounters:   01/24/17 74 kg (163 lb 2.3 oz)   01/23/17 74 kg (163 lb 2.3 oz)        General: Chronically ill appearing male in NAD.  He is alert, awake and follows commands  HEENT: Head is AT/NC. Sclera anicteric. No conjunctival injection.  Oropharynx is clear, moist and w/o exudate or lesions. Tracheostomy present  Lungs: Clear to auscultation anterior  Heart: Regular rate and rhythm.  No murmurs, gallops or rubs.  Normal S1 and S2.  Abdomen: Soft, non-tender, non-distended.  BS +.  PEG-J present  Extremities: No pedal edema.    Skin: No jaundice, rash  Neurologic: right spastic hemiplegia           Data:   Labs and imaging below were independently reviewed and interpreted    LAB WORK:    BMP  Recent Labs  Lab 01/24/17  0400 01/24/17  0025 01/23/17  1720 01/23/17  1000 01/23/17  0412  01/21/17  2348     --  142 142  --   --  144   POTASSIUM 3.5 3.0* 3.0* 3.0*  --   --  4.5   CHLORIDE 108  --  106 107  --   --  109   STEVE 7.5*  --   7.2* 7.0*  --   --  6.1*   CO2 24  --  28 25  --   --  20   BUN 20  --  26 28  --   --  22   CR 0.75  --  0.82 0.81 0.88  < > 1.04   *  --  100* 141*  --   --  202*   < > = values in this interval not displayed.  CBC  Recent Labs  Lab 01/24/17  0400 01/23/17  1720  01/23/17  1015 01/22/17  1113 01/22/17  0500   WBC 6.7 8.0  --  7.3  --  24.1*   RBC 3.15* 2.98*  --  2.49*  --  3.78*   HGB 7.4* 7.2*  < > 6.0*  --  8.9*   HCT 24.9* 23.4*  --  19.4*  --  30.1*   MCV 79 79  --  78  --  80   MCH 23.5* 24.2*  --  24.1*  --  23.5*   MCHC 29.7* 30.8*  --  30.9*  --  29.6*   RDW 19.4* 19.5*  --  20.1*  --  19.8*   * 115*  --  115* 153 196   < > = values in this interval not displayed.  INR  Recent Labs  Lab 01/23/17  1720 01/22/17  1003 01/22/17  0500   INR 1.53* 2.58* 2.48*     LFTs  Recent Labs  Lab 01/21/17  1600   ALKPHOS 63   AST 21   ALT 23   BILITOTAL 1.0   PROTTOTAL 6.1*   ALBUMIN 2.1*      PANC  Recent Labs  Lab 01/22/17  0500 01/21/17  1600   LIPASE 291 789*       IMAGING:  CT ABDOMEN PELVIS WITH CONTRAST   1/21/2017 4:27 PM       HISTORY: Diffuse abdominal pain.     TECHNIQUE:  CT abdomen and pelvis with 60 mL Isovue-370 IV. Radiation  dose for this scan was reduced using automated exposure control,  adjustment of the mA and/or kV according to patient size, or iterative  reconstruction technique.     COMPARISON: CT abdomen and pelvis 11/28/2016.     FINDINGS: There are patchy areas of consolidation at the bilateral  lower lobe bases series 2 image 6 and adjacent images. There is small  ascites again noted, slightly smaller in the interval at the abdominal  level. There is a percutaneous gastrostomy in place. Enteric feeding  tube tip ends at the proximal jejunum. There are multiple fluid  collections there are new or significantly larger involving the  pancreas. For example, a large complex septated fluid collection  occupies the majority of the pancreatic body and tail. There is no  definable  discrete fluid in this region on the prior exam. It is  currently approximately 11.8 x 4.7 cm in transverse plane series 2  image 32. An additional new fluid collection at the pancreatic head is  2.3 x 2.1 cm series 2 image 39. Much of the pancreas parenchyma is  replaced by these collections, but there is normal enhancement of the  remaining solid appearing portions of the pancreas in the region of  the pancreatic neck and head, and uncinate process. Pancreatic duct is  obscured.     The liver, gallbladder, spleen, and kidneys show no acute findings.  There is additional focal fluid abutting the posterior aspect of the  stomach. One example measures 4.7 x 1.8 cm image 40. Another locule  can be seen towards the left image 33. There is edema throughout the  mesentery. There is no bowel obstruction identified. No free air.                                                                       IMPRESSION:  1. Development of prominent complex fluid collections replacing much  of the pancreatic body and tail, along with a new fluid collection at  the pancreatic head. This is consistent with pancreatitis and acute  peripancreatic necrotic fluid collections. There is some residual  normal enhancing pancreatic parenchyma at the neck, regions of the  head, and uncinate process.  2. Bilateral lower lobe patchy consolidation worrisome for multifocal  pneumonia.  3. Ascites in the abdomen and pelvis is of small volume and is stable  versus just slightly smaller.  4. Other fluid collections noted adjacent to the stomach related to  pancreatitis.             =======================================================================     Pt seen with PA as joint consult. Agree w below findings. Key findings and recs as below       ASSESSMENT AND RECOMMENDATIONS:   Assessment:  56 year old male with a history of TBI with resultant seizures, aphasia, right sided hemiplegia, v-fib cardiac arrest, panhypopituitarism, and chronic  tracheostomy who is transferred from [facility] where he was admitted 1/21 from his LTACH with increasing abd discomfort, fevers and resp distress and was found to have necrotizing pancreatitis based on CT findings of multiple locations of walled off necrosis. He did undergo CT guided aspiration of the largest fluid collection in the pancreatic tail in which cultures grew enterobacter cloacae.     Clinically, the patient has improved, has remained afebrile since 1/22 and is currently denying abdominal pain. His PEG-J tube has been functioning since admission.     Recommendations:  Plan for EUS with cystgastrostomy placement (planned for tomorrow), possible PEG-J exchange (if it becomes dislodged)  Hold TF 6 hours prior to procedure  Cont abx for now  Continue PERT in the presence of PEG-J TF  Patient's mother in agreement with plan    Jack Marcus MD GI Staff

## 2017-01-24 NOTE — PHARMACY-CONSULT NOTE
Pharmacy Tube Feeding Consult    Medication reviewed for administration by feeding tube and for potential food/drug interactions.    Recommendation: No changes are needed at this time.     Pharmacy will continue to follow as new medications are ordered.    Ricky Rosales, JustinD

## 2017-01-24 NOTE — PLAN OF CARE
Problem: Patient Care Overview (Adult)  Goal: Plan of Care Review  S:  Pt admitted via ground transport from [facility] at 1700.  B:  Pt arrived on monitor ventilated via trach, VSS on arrival, pt appeared alert, followed commands on LUE and LLE.  PIV present in R AC and L foot w/ NS infusing TKO via R PIV, left ACC PICC capped.  Lerma in place.       Pt denied pain, abd slightly firm but not distended.  GJ tube in place----both ports irrigated easily with water then capped.      Labs sent per order.  See critical care flow sheet for remainder of assessment.  A:  Admission in progress  R:  Cont w/ care plan.

## 2017-01-24 NOTE — PLAN OF CARE
Problem: Goal Outcome Summary  Goal: Goal Outcome Summary  Neuro: R pupil 4/L pupil 3. Baseline R hemiplegia. Follows commands on L side. Patient swinging arms at times and pulling at trach tube, mitt placed on L hand. Patient able to be redirected. Restraint order placed if needed.      CV: HR NSR. BPS stable. Afebrile.    Resp; On vent, CMV/AC 14RR, 550TV, 5PEEP, 50 FiO2. Suctioning minimal secretions from trach tube. Attempted pressure support this am, but patient went apneic. Will re-attempt.     : Lerma output adequate.     GI: PEG-G-tube infusing Tube feeds at 30 ml/hr. Rectal tube patent with some leaking around tube.     Skin: Mepilix placed on coccyx and R heel.     Labs: K+/Phos replaced per protocol, continue to monitor.     Monitor patient status. Update MD as appropriate.

## 2017-01-24 NOTE — PROGRESS NOTES
CLINICAL NUTRITION SERVICES - ASSESSMENT NOTE     Nutrition Prescription    RECOMMENDATIONS FOR MDs/PROVIDERS TO ORDER:  1.  Change Nutrition Consult for TF to assess/ORDER to enable RDs to order/help manage TF therapy per all recs below.     2.  Order enteral Phos supplementation (NeutraPhos 1 pkt TID-QID; each 1 pkt provides 8 mmol Phos, 160 mg Na+ and 280 mg K+) in addition to IV replacement.     3.  If pt continues to require IVF supplementation in addition to TF/H2O flushes but Phos continues to decline < nrml value of 2.5 mg/dL, change to non-dextrose containing IVF    4.  Consult WOC if approp/suspect PIs/wounds (note this svc was following at previous OSH from 11-12/2016 but unclear if wounds resolved and noted RN comments below)    Malnutrition Status:    Non-severe (at least) malnutrition in the context of chronic illness.    Recommendations already ordered by Registered Dietitian (RD):  1.  Change TF regimen to the following given necrotizing pancreatitis (anticipate benefit from omega-3 FA, high PRO): begin @ 30 ml/hr and hold at this rate until Phos replaced >1.9 mg/dL and then proceed with adv by 10 ml q 8 hrs to  Impact Peptide @ goal 60 ml/hr (1440 ml/day) to provide 2160 kcals (29 kcal/kg), 135 g PRO (1.8 g/kg/day), 1109 ml free H2O, 92 g Fat (50% from MCTs), 202 g CHO and no Fiber daily.   --Order to HOLD advance of TF rate if K+/Mg++ < nrml and Phos <2 mg/dL.      2.  Order to obtain baseline acute phase PRO (CRP) and recheck every Monday to evaluate trend with duration/volumes of immune-modulating TF received and ongoing approp of specialized TF therapy vs. approp to change to maintenance-type formula.     3.  Order multivitamin/mineral (15 ml/day via FT) to help ensure micronutrient needs being met with suspected hypermetabolic demands and potential interruptions to TF infusions.     4.  Once begin TFs, begin the following  pancreatic enzyme replacement therapy (PERT)    A) Sodium Bicarb tablet  (325 mg), 1 tablet q 4 hrs via Jtube. Administration Instructions: Crush 1 tablet and mix into 15 ml of warm water and use this solution to mix with Creon pancreatic enzymes. DO NOT administer directly into Jtube (to be mixed into TF formula with Creon enzyme - see Creon enzyme order)   B) Creon 24, 1- 2 capsules q 4 hrs via Jtube. Administration Instructions: If TF rate is running @ 10-30 ml/hr, administer 1 capsule q 4 hrs; once TF rate advances 40-60 ml/hr, increase to 2 capsule q 4 hrs.  Open capsule and empty contents into 15 ml sodium bicarb solution (see sodium bicarb order), let dissolve for about 20-30 minutes and then add this solution to the amount of TF formula hung in TF bag every 4 hrs (i.e., once TF @ goal infusion 60 ml/hr will mix 2 capsules into 240 ml of TF formula every 4 hrs).   *Note: this enzyme regimen with TF @ goal infusion will provide approx 3130 units of lipase/gram of total Fat daily and approp dosing initially for pancreatic insufficiency with more elemental TF formula.      Future/Additional Recommendations:  If/when TF advances to goal and if pt remains on the vent, order metabolic cart study to better evaluate REE (kcal) needs and approp of energy provisions.        REASON FOR ASSESSMENT  Keyon Farias is a/an 56 year old male assessed by the dietitian for Provider Order - Registered Dietitian to Assess and MONITOR TF per Medical Nutrition Therapy Protocol    NUTRITION HISTORY  -Noted pt previously admitted to [facility] 11/23/16-12/9/17 and followed by RDs during that adm.  Per review of RD notes/information obtained the following: was on vent via trach (placed 12/2/16) with intermittent propofol gtt; initially had 18 Fr TORY Gtube placed prior to this adm on 10/31/16 but was converted to GJ on 12/2/16; per 12/5/16 RD note, described course of on/off TF due to ileus and pancreatitis but inability to adv >10-15 ml/hr; was prescribed Isosource 1.5 @ goal 55 ml/hr (1320 ml/day)  "to provide 1980 kcal (27 kcal per kg), 90 g protein (1.2 g per kg), 232 g CHO, 20 g fiber, 1003 mL H2O.  Also noted earlier in adm pt fed with Promote with Fiber (due to propofol kcal intakes).  WO had followed on 11-12/2016 previous adm for LUE wound appears to be superficial unroofed blister, clean, no drainage, no memo-wound erythema .    -Noted pt readmitted to [facility] where has been adm there 1/21-1/23 before transfer here and no nutrition/RD notes from this adm but per RD communication tools noted that RDs had received report from Anna SOMERS that prior to adm there pt had been transition to Jevity 1.5 @ 65 ml/hr x 16 hrs and infusion was stopped for med administration (did not specify which med TFs required holding for) + Prostat BID.  Also noted comments that d/t previous ileus with reductions in TF x 2 wks were trying to play \"catch up\" on nutritional intakes.     -Per H&P this adm, noted pt with dx of necrotizing pancreatitis and hypoxemic resp failure and remains on vent via trach.  Also noted hx of CVA and TBI with baseline R hemiplegia and per RN notes pt intermittently agitated/pulling at tube/swinging arms this AM and now restrained.    CURRENT NUTRITION ORDERS  -Diet: NPO    -Enteral access: GJ tube (converted to GJ on 12/2/16)    -EN: restarted TF last evening and currently @ 30 ml/hr; MDs ordered to restart Peptamen 1.5 20 ml/hr and adv by 10 mlq  4 hrs to goal 55 ml/hr (1320 ml/day) to provide 1980 kcals (27 kcal/kg/day), 90 g PRO (1.2 g/kg/day), 1016 ml free H2O, 74 g Fat (70% from MCTs), 248 g CHO and no Fiber daily (per dosing wt below of 74 kg)    -MIVF: D5 1/2 NS @ 100 ml/hr    LABS  -Phos 1.8, K+ 3.5 mg/dL (today) - Note receiving IV NaPhos and KCL replacement this AM    -Alb 2.1 (1/21/17), 1.4 (12/3/16) - suspect high degree of catabolism with depressed negative phase PROs (although no acute phase PRO markers currently available to help evaluate inflammatory response).  Would opt " "for high PRO/ensure LBM-sparing energy (see est needs below) given necrotizing pancreatitis.    -No fecal elastase results noted in EMRs but given nature of necrotizing pancreatitis now (i.e., suspect pancreatic exocrine deficiency), pt more appropriate for peptide/MCT based TF with pancreatic enzyme replacement therapy (PERT) and would likely benefit from omega-3 FA supplementation    MEDICATIONS  Medications reviewed   *No micronutrient supplementation nor PERT currently ordered  *Note: no meds currently ordered that this RD (nor Pharmacy) identifies as warranting holding of TF rate.    ANTHROPOMETRICS  Height: 0 cm (Data Unavailable) - per review of OSH RD notes, pt with height of:  Ht Readings from Last 2 Encounters:   01/21/17 1.651 m (5' 5\")   11/25/16 1.651 m (5' 5\")   *This RD entered this height into this adm.  Most Recent Weight: No weight checked yet this adm.  Reviewed OSH weight records and noted the following: (although anticipate 1/21/17 weight inaccurate given weight hx below)  01/23/17 0600 74 kg (163 lb 2.3 oz) AA     01/21/17 1534 54.432 kg (120 lb)       IBW: 62 kg (OSH adm wt of 74 kg @ 119%)  BMI: 27.1 kg/m2 (per OSH adm wt of 74 kg) = Overweight BMI 25-29.9  Weight History:   Wt Readings from Last 10 Encounters:   01/24/17 74 kg (163 lb 2.3 oz)   01/23/17 74 kg (163 lb 2.3 oz)   12/09/16 79.5 kg (175 lb 4.3 oz)   10/31/16 72.661 kg (160 lb 3 oz)   09/09/16 93.8 kg (206 lb 12.7 oz)   01/06/16 79.1 kg (174 lb 6.1 oz)   11/18/15 76.567 kg (168 lb 12.8 oz)   04/27/15 72.576 kg (160 lb)   03/08/15 70.308 kg (155 lb)   07/04/14 70.308 kg (155 lb)   11/23/16 72.7 kg     Dosing Weight: 74 kg (actual = OSH adm wt on 1/23/17)    ASSESSED NUTRITION NEEDS  Estimated Energy Needs: 9734-0748+ kcals/day (25 - 30+ kcals/kg)  Justification: Maintenance; (+) as anticipate pt may have increased needs with suspected hypermetabolism/catabolism assoc with necrotizing pancreatitis  Estimated Protein Needs: 111-148 " grams protein/day (1.5 - 2 grams of pro/kg)  Justification: Hypercatabolism with critical illness and hypercatabolism assoc with pancreatitis dx  Estimated Fluid Needs: (1 mL/kcal) or Per provider pending fluid status    PHYSICAL FINDINGS  See malnutrition section below.  -Skin: per RN notes, Mepilix placed on coccyx and R heel (no WOC consult ordered this adm)    MALNUTRITION  % Intake: Unable to assess (TF intakes @ OSH and previous LTAC unknown)  % Weight Loss: None noted (per available wt records)  Subcutaneous Fat Loss: Upper arm and Thoracic/intercostal:  Mild  Muscle Loss: Temporal, Scapular bone, Thoracic region (clavicle, acromium bone, deltoid, trapezius, pectoral), Upper arm (bicep, tricep), Upper leg (quadricep, hamstring), Patellar region and Posterior calf: all with at least Mild (note shoulder joint/squaring and leg/arms may be borderline Moderate)  Fluid Accumulation/Edema: Trace in LE (ankles/feet)  Malnutrition Diagnosis: Non-severe (at least) malnutrition in the context of chronic illness.    NUTRITION DIAGNOSIS  Inadequate protein-energy intake r/t hx of ileus lukaszley inhibiting overall TF intakes since 11/2016 and suspect possible inadequate absorption of EN with necrotizing pancreatitis AEB TF intakes @ OSH/LTAC unknown but noted possible sx of refeeding with TF @ only 30 ml/hr but also with IV dex in IVF and sx of fat/muscle wasting (mild to moderate)    INTERVENTIONS  Implementation  -Nutrition Education: Not appropriate at this time due to patient condition     -Collaboration and Referral of Nutrition care - Discussed plan for FEN/GI on rounds with MDs and given suspected necrotizing pancreatitis (noted low Bicarb on ABG), MDs agreed/approved RD recs above to change EN regimen to new TF formula (with omega-3)/begin PERT regimen with Na+ bicarb and to change IVF to LR/decrease rate as TF advances and ordered NeutraPhos and WOC consult per RD recs/RN input.    Goals  1.  Tolerate adv of TF to  goal infusion without sx of refeeding within the next 1-2 days.  2.  Total ave nutrition intakes (EN) to provide minimum 25 kcal/kg/day and 1.5 g PRO/kg/day (per 74 kg).     Monitoring/Evaluation  Progress toward goals will be monitored and evaluated per protocol.     Laine Will ,RD, LD, [facility] (pgr [phone])

## 2017-01-24 NOTE — SIGNIFICANT EVENT
01/24/17 1011   Visit Information   Visit Made By Priest Solis   Type of Visit Initial   Visited Patient   Interventions   Basic Spiritual Interventions    introduction/orientation to Spiritual Health Services;Prayer   Ritual/Sacramental Encounter  Worship anointing   Advanced Assessments/Interventions   Presenting Concerns/Issues Spiritual/Yazidism/emotional support   SPIRITUAL HEALTH SERVICES Significant Event  Worship Sacrament of ANOINTING  [facility] 4A    I anointed patient per request of patient  From chart notes, Keyon arrived here from OSH for further care management of complicated medical needs. Keyon is up in chair at time of visit. He has a trach, and so his communication is limited to hand and head gestures. He is alert, smiles when I enter. I note he lives in [place] and he affirms that he was raised there. His mother, his primary care giver, was not present at time of visit. I proposed prayer for strength and healing and he nodded in the affirmative. I subsequently anointed him.    Father Keyon Teri Solis

## 2017-01-24 NOTE — PROGRESS NOTES
SURGICAL ICU PROGRESS NOTE  January 24, 2017      CO-MORBIDITIES:   TBI  Seizure disorder  Hemiplegia  Panhypopituitarism  Chronic tracheostomy    ASSESSMENT: 56yM with TBI resulting in seizure disorder,spastic hemiplegia, and aphasia, v-fib cardiac arrest, panhypopituitarism and chronic trach who presents as a transfer for necrotizing pancreatitis. Fluid collection at the tail of the pancreas was aspirated and grew enterobacter. Patient is doing much better after fluid was aspirated and on minimal vent settings.    PLAN:   Neuro/ pain/ sedation:  - Monitor neurological status. Notify the MD for any acute changes in exam.  - Anti-seizure: Carbamazepine and brivaracetam  - Panhypopituitarism: desmopressin, synthroid, testosterone  - Seroquel for sedation    Pulmonary care:   - Wean FiO2 to keep saturation above 90%.  - Will work toward wean to trach dome     Cardiovascular:    - Monitor hemodynamic status.      GI care:   - NPO.  - Advance tube feeds to goal per protocol  - Meds per J tube  - Follow up GI recs     Fluids/ Electrolytes/ Nutrition:   - Stop IVF  - ICU electrolyte replacement protocol  - No indication for parenteral nutrition.  - Nutrition consulted. Appreciate recs    Renal/ Fluid Balance:     - Will monitor intake and output.  - Discontinue aaron     Endocrine:    - Decrease steroids to 50mg TID  - SSI     ID/ Antibiotics:  - Erta and vanco for pancreatic necrosis.   - Necrotic pancreatitis with fluid culture growing enterobacter and enterococcus     Heme:     - No indication for intervention     Prophylaxis:    - Mechanical prophylaxis for DVT.  - Hep TID  - PPI BID     MSK:     - PT and OT consulted. Appreciate recs.     Lines/ tubes/ drains:  - Trach, PIV x2, G-J tube     Disposition:  - Surgical ICU.    Patient seen, findings and plan discussed with surgical ICU staff.    Yoel Montgomery MD  General Surgery PGY-2    ====================================    TODAY'S PROGRESS:   SUBJECTIVE:   Feels  good. Tolerating tube feeds. Having BM. Voiding. No pain    OBJECTIVE:   1. VITAL SIGNS:   Temp:  [96.6  F (35.9  C)-98.1  F (36.7  C)] 96.9  F (36.1  C)  Heart Rate:  [] 90  Resp:  [12-29] 18  BP: ()/() 120/80 mmHg  FiO2 (%):  [50 %] 50 %  SpO2:  [99 %-100 %] 100 %  Ventilation Mode: CPAP/PS  FiO2 (%): 50 %  Rate Set (breaths/minute): 14 breaths/min  Tidal Volume Set (mL): 550 mL  PEEP (cm H2O): 5 cmH2O  Pressure Support (cm H2O): 10 cmH2O  Oxygen Concentration (%): 50 %  Resp: 18    2. INTAKE/ OUTPUT:   I/O last 3 completed shifts:  In: 2150 [I.V.:2000]  Out: 1680 [Urine:1680]    3. PHYSICAL EXAMINATION:   General: Awake and alert  HEENT: Trach in place, on pressure support  Neuro: Follows commands.  CV: RRR  Pulm: Ventilated  Abd: Soft; non-tender; non-distended; G-J tube in place; no peritoneal signs  Extremities: WWP    4. INVESTIGATIONS:   Arterial Blood Gases     Recent Labs  Lab 01/22/17  0500 01/21/17  2348   PH 7.56* 7.47*   PCO2 22* 26*   PO2 133* 117*   HCO3 20* 19*     Complete Blood Count     Recent Labs  Lab 01/24/17  0400 01/23/17  1720 01/23/17  1130 01/23/17  1015 01/22/17  1113 01/22/17  0500   WBC 6.7 8.0  --  7.3  --  24.1*   HGB 7.4* 7.2* 6.0* 6.0*  --  8.9*   * 115*  --  115* 153 196     Basic Metabolic Panel    Recent Labs  Lab 01/24/17  0400 01/24/17  0025 01/23/17  1720 01/23/17  1000 01/23/17  0412  01/21/17  2348     --  142 142  --   --  144   POTASSIUM 3.5 3.0* 3.0* 3.0*  --   --  4.5   CHLORIDE 108  --  106 107  --   --  109   CO2 24  --  28 25  --   --  20   BUN 20  --  26 28  --   --  22   CR 0.75  --  0.82 0.81 0.88  < > 1.04   *  --  100* 141*  --   --  202*   < > = values in this interval not displayed.  Liver Function Tests    Recent Labs  Lab 01/23/17  1720 01/22/17  1003 01/22/17  0500 01/21/17  1600   AST  --   --   --  21   ALT  --   --   --  23   ALKPHOS  --   --   --  63   BILITOTAL  --   --   --  1.0   ALBUMIN  --   --   --  2.1*   INR  1.53* 2.58* 2.48*  --      Pancreatic Enzymes    Recent Labs  Lab 01/22/17  0500 01/21/17  1600   LIPASE 291 789*     Coagulation Profile    Recent Labs  Lab 01/23/17  1720 01/22/17  1003 01/22/17  0500   INR 1.53* 2.58* 2.48*   PTT  --  52*  --      =========================================

## 2017-01-24 NOTE — PLAN OF CARE
Problem: Goal Outcome Summary  Goal: Goal Outcome Summary  Outcome: No Change  S;  Pressure supported, afebrile, no pain  B:  Pressure supported initially requiring 12 of PS then 10 to have MV in desired range.  Terminated after 3.5 hours when tackypneic.  Sux q 2 h, moderate amts, lungs remain coarse.       Afebrile, denies pain, does not appear in pain.       VSS. See critical care flow sheet.      Mother in most of day and updated by MDs.     Coccyx reddened w/ blanchable and nonblanchable areas, cushion ordered, wound care consult ordered.  A:  As noted.  R:  Cont w/ supportive care

## 2017-01-25 ENCOUNTER — APPOINTMENT (OUTPATIENT)
Dept: GENERAL RADIOLOGY | Facility: CLINIC | Age: 57
DRG: 405 | End: 2017-01-25
Attending: INTERNAL MEDICINE
Payer: MEDICARE

## 2017-01-25 ENCOUNTER — APPOINTMENT (OUTPATIENT)
Dept: CT IMAGING | Facility: CLINIC | Age: 57
DRG: 405 | End: 2017-01-25
Attending: PHYSICIAN ASSISTANT
Payer: MEDICARE

## 2017-01-25 LAB
ANION GAP SERPL CALCULATED.3IONS-SCNC: 6 MMOL/L (ref 3–14)
ANION GAP SERPL CALCULATED.3IONS-SCNC: 8 MMOL/L (ref 3–14)
BACTERIA SPEC CULT: ABNORMAL
BUN SERPL-MCNC: 15 MG/DL (ref 7–30)
BUN SERPL-MCNC: 16 MG/DL (ref 7–30)
CALCIUM SERPL-MCNC: 8.2 MG/DL (ref 8.5–10.1)
CALCIUM SERPL-MCNC: 8.6 MG/DL (ref 8.5–10.1)
CHLORIDE SERPL-SCNC: 135 MMOL/L (ref 94–109)
CHLORIDE SERPL-SCNC: 136 MMOL/L (ref 94–109)
CO2 SERPL-SCNC: 28 MMOL/L (ref 20–32)
CO2 SERPL-SCNC: 29 MMOL/L (ref 20–32)
CREAT SERPL-MCNC: 0.8 MG/DL (ref 0.66–1.25)
CREAT SERPL-MCNC: 0.82 MG/DL (ref 0.66–1.25)
CRP SERPL-MCNC: 170 MG/L (ref 0–8)
ERYTHROCYTE [DISTWIDTH] IN BLOOD BY AUTOMATED COUNT: 19.4 % (ref 10–15)
GFR SERPL CREATININE-BSD FRML MDRD: ABNORMAL ML/MIN/1.7M2
GFR SERPL CREATININE-BSD FRML MDRD: ABNORMAL ML/MIN/1.7M2
GLUCOSE BLDC GLUCOMTR-MCNC: 122 MG/DL (ref 70–99)
GLUCOSE SERPL-MCNC: 127 MG/DL (ref 70–99)
GLUCOSE SERPL-MCNC: 138 MG/DL (ref 70–99)
HCT VFR BLD AUTO: 26.5 % (ref 40–53)
HGB BLD-MCNC: 7.8 G/DL (ref 13.3–17.7)
INR PPP: 1.49 (ref 0.86–1.14)
INTERPRETATION ECG - MUSE: NORMAL
MAGNESIUM SERPL-MCNC: 2.3 MG/DL (ref 1.6–2.3)
MAGNESIUM SERPL-MCNC: 2.6 MG/DL (ref 1.6–2.3)
MAGNESIUM SERPL-MCNC: 2.8 MG/DL (ref 1.6–2.3)
MCH RBC QN AUTO: 23.7 PG (ref 26.5–33)
MCHC RBC AUTO-ENTMCNC: 29.4 G/DL (ref 31.5–36.5)
MCV RBC AUTO: 81 FL (ref 78–100)
MICRO REPORT STATUS: ABNORMAL
MICROORGANISM SPEC CULT: ABNORMAL
MICROORGANISM SPEC CULT: ABNORMAL
OSMOLALITY UR: 175 MMOL/KG (ref 100–1200)
PHOSPHATE SERPL-MCNC: 1.6 MG/DL (ref 2.5–4.5)
PHOSPHATE SERPL-MCNC: 1.8 MG/DL (ref 2.5–4.5)
PHOSPHATE SERPL-MCNC: 2.6 MG/DL (ref 2.5–4.5)
PLATELET # BLD AUTO: 162 10E9/L (ref 150–450)
POTASSIUM SERPL-SCNC: 3 MMOL/L (ref 3.4–5.3)
POTASSIUM SERPL-SCNC: 3.3 MMOL/L (ref 3.4–5.3)
POTASSIUM SERPL-SCNC: 3.4 MMOL/L (ref 3.4–5.3)
RBC # BLD AUTO: 3.29 10E12/L (ref 4.4–5.9)
SODIUM SERPL-SCNC: 164 MMOL/L (ref 133–144)
SODIUM SERPL-SCNC: 168 MMOL/L (ref 133–144)
SODIUM SERPL-SCNC: 170 MMOL/L (ref 133–144)
SODIUM SERPL-SCNC: 171 MMOL/L (ref 133–144)
SODIUM SERPL-SCNC: 172 MMOL/L (ref 133–144)
SODIUM SERPL-SCNC: 175 MMOL/L (ref 133–144)
SODIUM SERPL-SCNC: 179 MMOL/L (ref 133–144)
SPECIMEN SOURCE: ABNORMAL
WBC # BLD AUTO: 7.2 10E9/L (ref 4–11)

## 2017-01-25 PROCEDURE — 99291 CRITICAL CARE FIRST HOUR: CPT | Mod: GC | Performed by: SURGERY

## 2017-01-25 PROCEDURE — 93010 ELECTROCARDIOGRAM REPORT: CPT | Mod: 77 | Performed by: INTERNAL MEDICINE

## 2017-01-25 PROCEDURE — 25000125 ZZHC RX 250

## 2017-01-25 PROCEDURE — 25000132 ZZH RX MED GY IP 250 OP 250 PS 637: Mod: GY | Performed by: SURGERY

## 2017-01-25 PROCEDURE — 84295 ASSAY OF SERUM SODIUM: CPT | Performed by: SURGERY

## 2017-01-25 PROCEDURE — 00000146 ZZHCL STATISTIC GLUCOSE BY METER IP

## 2017-01-25 PROCEDURE — 84295 ASSAY OF SERUM SODIUM: CPT | Performed by: PHYSICIAN ASSISTANT

## 2017-01-25 PROCEDURE — 84100 ASSAY OF PHOSPHORUS: CPT | Performed by: PHYSICIAN ASSISTANT

## 2017-01-25 PROCEDURE — 40000281 ZZH STATISTIC TRANSPORT TIME EA 15 MIN

## 2017-01-25 PROCEDURE — 85027 COMPLETE CBC AUTOMATED: CPT | Performed by: SURGERY

## 2017-01-25 PROCEDURE — 85610 PROTHROMBIN TIME: CPT | Performed by: SURGERY

## 2017-01-25 PROCEDURE — 86923 COMPATIBILITY TEST ELECTRIC: CPT | Performed by: SURGERY

## 2017-01-25 PROCEDURE — 84100 ASSAY OF PHOSPHORUS: CPT | Performed by: SURGERY

## 2017-01-25 PROCEDURE — 93010 ELECTROCARDIOGRAM REPORT: CPT | Performed by: INTERNAL MEDICINE

## 2017-01-25 PROCEDURE — A9270 NON-COVERED ITEM OR SERVICE: HCPCS | Mod: GY

## 2017-01-25 PROCEDURE — 36584 COMPL RPLCMT PICC RS&I: CPT

## 2017-01-25 PROCEDURE — 20000004 ZZH R&B ICU UMMC

## 2017-01-25 PROCEDURE — A9270 NON-COVERED ITEM OR SERVICE: HCPCS | Mod: GY | Performed by: SURGERY

## 2017-01-25 PROCEDURE — 93005 ELECTROCARDIOGRAM TRACING: CPT

## 2017-01-25 PROCEDURE — 83935 ASSAY OF URINE OSMOLALITY: CPT | Performed by: SURGERY

## 2017-01-25 PROCEDURE — 86900 BLOOD TYPING SEROLOGIC ABO: CPT | Performed by: SURGERY

## 2017-01-25 PROCEDURE — 40000940 XR CHEST PORT 1 VW

## 2017-01-25 PROCEDURE — 74176 CT ABD & PELVIS W/O CONTRAST: CPT

## 2017-01-25 PROCEDURE — 94003 VENT MGMT INPAT SUBQ DAY: CPT

## 2017-01-25 PROCEDURE — 40000275 ZZH STATISTIC RCP TIME EA 10 MIN

## 2017-01-25 PROCEDURE — 86140 C-REACTIVE PROTEIN: CPT | Performed by: SURGERY

## 2017-01-25 PROCEDURE — 25000125 ZZHC RX 250: Performed by: SURGERY

## 2017-01-25 PROCEDURE — 84132 ASSAY OF SERUM POTASSIUM: CPT | Performed by: PHYSICIAN ASSISTANT

## 2017-01-25 PROCEDURE — 25000132 ZZH RX MED GY IP 250 OP 250 PS 637: Mod: GY

## 2017-01-25 PROCEDURE — 80048 BASIC METABOLIC PNL TOTAL CA: CPT | Performed by: SURGERY

## 2017-01-25 PROCEDURE — 86901 BLOOD TYPING SEROLOGIC RH(D): CPT | Performed by: SURGERY

## 2017-01-25 PROCEDURE — 25000125 ZZHC RX 250: Performed by: NURSE PRACTITIONER

## 2017-01-25 PROCEDURE — 83735 ASSAY OF MAGNESIUM: CPT | Performed by: PHYSICIAN ASSISTANT

## 2017-01-25 PROCEDURE — 83735 ASSAY OF MAGNESIUM: CPT | Performed by: SURGERY

## 2017-01-25 PROCEDURE — 27210197 ZZH KIT POWER PICC TRIPLE LUMEN

## 2017-01-25 PROCEDURE — 86850 RBC ANTIBODY SCREEN: CPT | Performed by: SURGERY

## 2017-01-25 PROCEDURE — 25000128 H RX IP 250 OP 636: Performed by: PHYSICIAN ASSISTANT

## 2017-01-25 PROCEDURE — 99211 OFF/OP EST MAY X REQ PHY/QHP: CPT

## 2017-01-25 PROCEDURE — 27210436 ZZH NUTRITION PRODUCT SEMIELEM INTERMED CAN

## 2017-01-25 RX ORDER — HEPARIN SODIUM,PORCINE 10 UNIT/ML
2-5 VIAL (ML) INTRAVENOUS
Status: DISCONTINUED | OUTPATIENT
Start: 2017-01-25 | End: 2017-02-10 | Stop reason: HOSPADM

## 2017-01-25 RX ORDER — HEPARIN SODIUM,PORCINE 10 UNIT/ML
5-10 VIAL (ML) INTRAVENOUS EVERY 24 HOURS
Status: DISCONTINUED | OUTPATIENT
Start: 2017-01-25 | End: 2017-02-10 | Stop reason: HOSPADM

## 2017-01-25 RX ORDER — QUETIAPINE FUMARATE 25 MG/1
25 TABLET, FILM COATED ORAL AT BEDTIME
Status: COMPLETED | OUTPATIENT
Start: 2017-01-25 | End: 2017-01-25

## 2017-01-25 RX ORDER — HEPARIN SODIUM,PORCINE 10 UNIT/ML
5-10 VIAL (ML) INTRAVENOUS
Status: DISCONTINUED | OUTPATIENT
Start: 2017-01-25 | End: 2017-02-10 | Stop reason: HOSPADM

## 2017-01-25 RX ORDER — LIDOCAINE 40 MG/G
CREAM TOPICAL
Status: DISCONTINUED | OUTPATIENT
Start: 2017-01-25 | End: 2017-01-31

## 2017-01-25 RX ORDER — DEXTROSE MONOHYDRATE 50 MG/ML
INJECTION, SOLUTION INTRAVENOUS CONTINUOUS
Status: DISCONTINUED | OUTPATIENT
Start: 2017-01-25 | End: 2017-01-26

## 2017-01-25 RX ADMIN — PANCRELIPASE 24000 UNITS: 24000; 76000; 120000 CAPSULE, DELAYED RELEASE PELLETS ORAL at 23:57

## 2017-01-25 RX ADMIN — HYDROCORTISONE SODIUM SUCCINATE 50 MG: 100 INJECTION, POWDER, FOR SOLUTION INTRAMUSCULAR; INTRAVENOUS at 11:10

## 2017-01-25 RX ADMIN — CARBAMAZEPINE 200 MG: 100 TABLET, CHEWABLE ORAL at 15:17

## 2017-01-25 RX ADMIN — CALCIUM CARBONATE 1250 MG: 1250 SUSPENSION ORAL at 11:17

## 2017-01-25 RX ADMIN — PANCRELIPASE 24000 UNITS: 24000; 76000; 120000 CAPSULE, DELAYED RELEASE PELLETS ORAL at 04:02

## 2017-01-25 RX ADMIN — CALCIUM CARBONATE 1250 MG: 1250 SUSPENSION ORAL at 17:54

## 2017-01-25 RX ADMIN — LEVOTHYROXINE SODIUM 137 MCG: 137 TABLET ORAL at 08:27

## 2017-01-25 RX ADMIN — POTASSIUM CHLORIDE 20 MEQ: 1.5 POWDER, FOR SOLUTION ORAL at 15:16

## 2017-01-25 RX ADMIN — CARBAMAZEPINE 200 MG: 100 TABLET, CHEWABLE ORAL at 11:17

## 2017-01-25 RX ADMIN — HEPARIN SODIUM 5000 UNITS: 5000 INJECTION, SOLUTION INTRAVENOUS; SUBCUTANEOUS at 17:59

## 2017-01-25 RX ADMIN — SODIUM BICARBONATE 325 MG: 325 TABLET ORAL at 20:12

## 2017-01-25 RX ADMIN — POTASSIUM CHLORIDE 40 MEQ: 1.5 POWDER, FOR SOLUTION ORAL at 11:06

## 2017-01-25 RX ADMIN — Medication 15 ML: at 08:22

## 2017-01-25 RX ADMIN — HYDROCORTISONE SODIUM SUCCINATE 25 MG: 100 INJECTION, POWDER, FOR SOLUTION INTRAMUSCULAR; INTRAVENOUS at 17:59

## 2017-01-25 RX ADMIN — LINEZOLID 600 MG: 600 INJECTION, SOLUTION INTRAVENOUS at 09:41

## 2017-01-25 RX ADMIN — ERTAPENEM SODIUM 1 G: 1 INJECTION, POWDER, LYOPHILIZED, FOR SOLUTION INTRAMUSCULAR; INTRAVENOUS at 17:54

## 2017-01-25 RX ADMIN — DEXTROSE MONOHYDRATE: 50 INJECTION, SOLUTION INTRAVENOUS at 06:16

## 2017-01-25 RX ADMIN — ACETAMINOPHEN 650 MG: 325 TABLET, FILM COATED ORAL at 02:20

## 2017-01-25 RX ADMIN — HEPARIN SODIUM 5000 UNITS: 5000 INJECTION, SOLUTION INTRAVENOUS; SUBCUTANEOUS at 11:06

## 2017-01-25 RX ADMIN — POTASSIUM CHLORIDE 20 MEQ: 1.5 POWDER, FOR SOLUTION ORAL at 23:14

## 2017-01-25 RX ADMIN — POTASSIUM CHLORIDE 40 MEQ: 1.5 POWDER, FOR SOLUTION ORAL at 21:33

## 2017-01-25 RX ADMIN — CALCIUM CARBONATE 1250 MG: 1250 SUSPENSION ORAL at 08:28

## 2017-01-25 RX ADMIN — MELATONIN TAB 3 MG 6 MG: 3 TAB at 21:35

## 2017-01-25 RX ADMIN — BRIVARACETAM 50 MG: 50 INJECTION, SUSPENSION INTRAVENOUS at 21:49

## 2017-01-25 RX ADMIN — SODIUM BICARBONATE 325 MG: 325 TABLET ORAL at 16:35

## 2017-01-25 RX ADMIN — CARBAMAZEPINE 200 MG: 100 TABLET, CHEWABLE ORAL at 20:13

## 2017-01-25 RX ADMIN — CARBAMAZEPINE 200 MG: 100 TABLET, CHEWABLE ORAL at 08:27

## 2017-01-25 RX ADMIN — SODIUM BICARBONATE 325 MG: 325 TABLET ORAL at 04:02

## 2017-01-25 RX ADMIN — Medication 81 MG: at 08:28

## 2017-01-25 RX ADMIN — POTASSIUM CHLORIDE 20 MEQ: 1.5 POWDER, FOR SOLUTION ORAL at 02:20

## 2017-01-25 RX ADMIN — PANCRELIPASE 24000 UNITS: 24000; 76000; 120000 CAPSULE, DELAYED RELEASE PELLETS ORAL at 12:35

## 2017-01-25 RX ADMIN — PANCRELIPASE 24000 UNITS: 24000; 76000; 120000 CAPSULE, DELAYED RELEASE PELLETS ORAL at 20:12

## 2017-01-25 RX ADMIN — BRIVARACETAM 50 MG: 50 INJECTION, SUSPENSION INTRAVENOUS at 12:29

## 2017-01-25 RX ADMIN — HYDROMORPHONE HYDROCHLORIDE 0.5 MG: 10 INJECTION, SOLUTION INTRAMUSCULAR; INTRAVENOUS; SUBCUTANEOUS at 12:29

## 2017-01-25 RX ADMIN — POTASSIUM PHOSPHATE, MONOBASIC AND POTASSIUM PHOSPHATE, DIBASIC 20 MMOL: 224; 236 INJECTION, SOLUTION INTRAVENOUS at 11:26

## 2017-01-25 RX ADMIN — PANCRELIPASE 24000 UNITS: 24000; 76000; 120000 CAPSULE, DELAYED RELEASE PELLETS ORAL at 08:20

## 2017-01-25 RX ADMIN — SODIUM BICARBONATE 325 MG: 325 TABLET ORAL at 12:35

## 2017-01-25 RX ADMIN — Medication 40 MG: at 20:13

## 2017-01-25 RX ADMIN — VASOPRESSIN 2.4 UNITS/HR: 20 INJECTION, SOLUTION INTRAMUSCULAR; SUBCUTANEOUS at 21:58

## 2017-01-25 RX ADMIN — DEXTROSE MONOHYDRATE: 50 INJECTION, SOLUTION INTRAVENOUS at 11:26

## 2017-01-25 RX ADMIN — SODIUM BICARBONATE 325 MG: 325 TABLET ORAL at 08:20

## 2017-01-25 RX ADMIN — VASOPRESSIN 2.4 UNITS/HR: 20 INJECTION, SOLUTION INTRAMUSCULAR; SUBCUTANEOUS at 09:06

## 2017-01-25 RX ADMIN — PANCRELIPASE 24000 UNITS: 24000; 76000; 120000 CAPSULE, DELAYED RELEASE PELLETS ORAL at 16:35

## 2017-01-25 RX ADMIN — POTASSIUM PHOSPHATE, MONOBASIC AND POTASSIUM PHOSPHATE, DIBASIC 10 MMOL: 224; 236 INJECTION, SOLUTION INTRAVENOUS at 23:11

## 2017-01-25 RX ADMIN — ACETAMINOPHEN 650 MG: 325 TABLET, FILM COATED ORAL at 20:12

## 2017-01-25 RX ADMIN — ACETAMINOPHEN 650 MG: 325 TABLET, FILM COATED ORAL at 08:19

## 2017-01-25 RX ADMIN — QUETIAPINE FUMARATE 25 MG: 25 TABLET, FILM COATED ORAL at 03:44

## 2017-01-25 RX ADMIN — QUETIAPINE 50 MG: 50 TABLET, FILM COATED ORAL at 21:35

## 2017-01-25 RX ADMIN — HEPARIN SODIUM 5000 UNITS: 5000 INJECTION, SOLUTION INTRAVENOUS; SUBCUTANEOUS at 02:20

## 2017-01-25 RX ADMIN — DEXTROSE MONOHYDRATE: 50 INJECTION, SOLUTION INTRAVENOUS at 21:39

## 2017-01-25 RX ADMIN — SODIUM BICARBONATE 325 MG: 325 TABLET ORAL at 00:38

## 2017-01-25 RX ADMIN — Medication 40 MG: at 08:29

## 2017-01-25 RX ADMIN — SODIUM BICARBONATE 325 MG: 325 TABLET ORAL at 23:57

## 2017-01-25 RX ADMIN — QUETIAPINE 50 MG: 50 TABLET, FILM COATED ORAL at 02:32

## 2017-01-25 RX ADMIN — POTASSIUM CHLORIDE 40 MEQ: 1.5 POWDER, FOR SOLUTION ORAL at 21:34

## 2017-01-25 RX ADMIN — HYDROMORPHONE HYDROCHLORIDE 0.5 MG: 10 INJECTION, SOLUTION INTRAMUSCULAR; INTRAVENOUS; SUBCUTANEOUS at 02:32

## 2017-01-25 RX ADMIN — PANCRELIPASE 24000 UNITS: 24000; 76000; 120000 CAPSULE, DELAYED RELEASE PELLETS ORAL at 00:38

## 2017-01-25 RX ADMIN — ACETAMINOPHEN 650 MG: 325 TABLET, FILM COATED ORAL at 15:16

## 2017-01-25 RX ADMIN — HYDROCORTISONE SODIUM SUCCINATE 50 MG: 100 INJECTION, POWDER, FOR SOLUTION INTRAMUSCULAR; INTRAVENOUS at 02:20

## 2017-01-25 RX ADMIN — LINEZOLID 600 MG: 600 INJECTION, SOLUTION INTRAVENOUS at 21:37

## 2017-01-25 RX ADMIN — LIDOCAINE HYDROCHLORIDE 2 ML: 10 INJECTION, SOLUTION INFILTRATION; PERINEURAL at 13:55

## 2017-01-25 ASSESSMENT — ACTIVITIES OF DAILY LIVING (ADL)
RETIRED_EATING: 2-->ASSISTIVE PERSON
SWALLOWING: 2-->DIFFICULTY SWALLOWING FOODS
TOILETING: 3-->ASSISTIVE EQUIPMENT AND PERSON
AMBULATION: 4-->COMPLETELY DEPENDENT
RETIRED_COMMUNICATION: 2-->DIFFICULTY SPEAKING (NOT RELATED TO LANGUAGE BARRIER)
BATHING: 3-->ASSISTIVE EQUIPMENT AND PERSON
COGNITION: 2 - DIFFICULTY WITH ORGANIZING THOUGHTS
DRESS: 3-->ASSISTIVE EQUIPMENT AND PERSON
TRANSFERRING: 3-->ASSISTIVE EQUIPMENT AND PERSON

## 2017-01-25 NOTE — PLAN OF CARE
Problem: Goal Outcome Summary  Goal: Goal Outcome Summary  PT 4A: PT holding per discussion with RN, pt is not following commands/actively participating. OT planning to see today. Will hold until pt's activity tolerance improves.

## 2017-01-25 NOTE — PROGRESS NOTES
"                                                  GASTROENTEROLOGY PROGRESS NOTE    Date of Admission: 1/23/2017  Reason for Admission: necrotizing pancreatitis    Assessment:  56 year old male with a history of TBI with resultant seizures, aphasia, right sided hemiplegia, v-fib cardiac arrest, panhypopituitarism, and chronic tracheostomy who is transferred from [facility] where he was admitted 1/21 from his LTACH with increasing abd discomfort, fevers and resp distress and was found to have necrotizing pancreatitis based on CT findings of multiple locations of walled off necrosis. He did undergo CT guided aspiration of the largest fluid collection in the pancreatic tail in which cultures grew enterobacter cloacae complex + VRE.     Clinically, the patient has improved, has remained afebrile since 1/22 and is currently denying abdominal pain. His PEG-J tube has been functioning since admission.    Patient profoundly hypernatremic, likely iatrogenic diabetes insipidus.       Recommendations:  Agree with repeat CT scan abd/pelv prior to EUS  Plan for EUS with cystgastrostomy placement, possible PEG-J exchange (if it becomes dislodged) - timing dependent on electrolyte correction  Hold TF 6 hours prior to procedure  Cont abx for now  Continue PERT in the presence of PEG-J TF  Patient's mother in agreement with plan    Discussed with primary SICU team    Gastroenterology follow up recommendations: TBD    Pt care plan discussed with Dr. Marcus, GI staff physician.    Meliza Lofton PA-C  Therapeutic Endoscopy/Pancreaticobiliary [facility]  [facility]  Pager *[phone]  _______________________________________________________________      Subjective: Patient seen and examined at 1000. Patient shakes head \"no\" when asked about abd pain. Mother not at bedside. Sodium profoundly elevated to 179 this morning (normal yesterday), mental status seems to be at baseline.    ROS:   10 pt ROS " "negative unless noted in subjective.     Objective:  Blood pressure 128/74, temperature 98.9  F (37.2  C), temperature source Axillary, resp. rate 14, height 1.651 m (5' 5\"), weight 71.7 kg (158 lb 1.1 oz), SpO2 100 %.  Gen: NAD  HEENT: No icertus  Abd: Soft, NT, ND. Not TTP  Ext: WWP  Skin: No jaundice        LABS:  BMP  Recent Labs  Lab 01/25/17  0727 01/25/17  0705 01/25/17  0521 01/25/17  0426 01/24/17  2135 01/24/17  1442 01/24/17  0400  01/23/17  1720   * 175* 171* 172*  --   --  142  --  142   POTASSIUM  --   --  3.3* 3.4 3.3* 3.3* 3.5  < > 3.0*   CHLORIDE  --   --  135* 136*  --   --  108  --  106   STEVE  --   --  8.6 8.2*  --   --  7.5*  --  7.2*   CO2  --   --  29 28  --   --  24  --  28   BUN  --   --  16 15  --   --  20  --  26   CR  --   --  0.80 0.82  --   --  0.75  --  0.82   GLC  --   --  138* 127*  --   --  104*  --  100*   < > = values in this interval not displayed.  CBC  Recent Labs  Lab 01/25/17  0426 01/24/17  0400 01/23/17  1720  01/23/17  1015   WBC 7.2 6.7 8.0  --  7.3   RBC 3.29* 3.15* 2.98*  --  2.49*   HGB 7.8* 7.4* 7.2*  < > 6.0*   HCT 26.5* 24.9* 23.4*  --  19.4*   MCV 81 79 79  --  78   MCH 23.7* 23.5* 24.2*  --  24.1*   MCHC 29.4* 29.7* 30.8*  --  30.9*   RDW 19.4* 19.4* 19.5*  --  20.1*    117* 115*  --  115*   < > = values in this interval not displayed.  INR  Recent Labs  Lab 01/25/17  0426 01/23/17  1720 01/22/17  1003 01/22/17  0500   INR 1.49* 1.53* 2.58* 2.48*     LFTs  Recent Labs  Lab 01/21/17  1600   ALKPHOS 63   AST 21   ALT 23   BILITOTAL 1.0   PROTTOTAL 6.1*   ALBUMIN 2.1*      PANC  Recent Labs  Lab 01/22/17  0500 01/21/17  1600   LIPASE 291 789*         IMAGING:  CT ABDOMEN PELVIS W/O CONTRAST 1/25/2017 9:34 AM     History: necrotizing pancreatitis, planning for EUS/necrosectomy     Comparison: 1/22/2017, 1/21/2017     Technique: Helical CT images were obtained from the dome of the liver  to the since the without contrast.  Images were reconstructed " in  multiple planes using multiple reconstruction algorithms.       Findings:  Lung bases: Micronodularity in the left lung base, with atelectasis  and/or consolidation in both lung bases. Small left pleural effusion.  Partially visualized central venous catheter with tip at the superior  atriocaval junction.     Abdomen and pelvis: GJ tube with tip in the proximal jejunum. The  majority of the pancreatic parenchyma is replaced by a heterogeneous  fat containing collection measuring approximately 11.5 x 4.8 x 5.7 cm  with multiple apparent loculations. In the pancreatic head, there  appears to be a second separate heterogeneous collection measuring 2.9  cm in diameter. At the root of the mesentery inferior to the duodenal  sweep (series 5 image 259), there is a 4.6 x 2.0 cm more simple  appearing fluid collection with intermediate density. Along the  inferior margin of the greater curvature of the stomach adjacent to  the G-tube, there is a 7.3 x 3.6 x 5.1 cm collection, which is larger  than on the prior exam, and shows intermediate density. No dilated  loops of small or large bowel. Mild, likely reactive lymphadenopathy  in the abdomen. Postoperative evidence of partial hepatectomy. Fluid  in the rectovesical pouch. Noncontrast evaluation kidneys (except for  a nonobstructing 2 mm stone which is stable since 7/30/2013), spleen,  urinary bladder, prostate, small bowel, large bowel are unremarkable.     Subcutaneous soft tissues: Tiny fat-containing umbilical hernia. Mild  anasarca. Partially visualized left-sided PICC.     Bones: Mild multilevel degenerative change of the visualized spine  without aggressive appearing bony lesion or acute fracture. Several  chronic appearing compression deformities of the superior endplates of  the upper lumbar spine                                                                       Impression:    1. The pancreatic parenchyma is nearly completely replaced by a  heterogeneous,  multilobulated necrotic collection which is not  significantly changed in size when compared to 1/21/2017.  2. Separate collection along the inferior/lateral margin of the  stomach at the level of the G-tube is slightly larger than on  1/21/2017, now measuring approximately 7.3 x 3.6 x 5.1 cm.  3. Other collections in the pancreatic head and root of the mesentery  are not significantly changed.  4. Mild ascites along the spleen and in the pelvis.  5. Reticulonodular appearance of the right lung base with associated  consolidation, concerning for infection or aspiration.  6. Small left pleural effusion with associated left basilar  atelectasis and/or consolidation.

## 2017-01-25 NOTE — PROGRESS NOTES
WOC Nurse Inpatient Adult Wound Assessment    Initial Assessment  Reason for consultation: Evaluate and treat coccyx wound    Assessment:   Coccyx wound due to Moisture Associated Skin Damage (MASD)    Status: initial assessment and is stable    Last Photo: n/a    TREATMENT  PLAN  Coccyx wound:   Start cleanse with microklenz and pat dry, apply sacral mepilex for protection.  Change every third day.  Orders Written  WOC Nurse follow-up plan: Will sign off, please re-consult if needed.   Nursing to notify the Provider(s) and re-consult the WOC Nurse if wound(s) deteriorates or new skin concern.    Patient History  According to provider note(s):  56 year old male with a history of TBI with resultant seizures, aphasia, right sided hemiplegia, v-fib cardiac arrest, panhypopituitarism, and chronic tracheostomy who is transferred from [facility] where he was admitted  from his LTACH with increasing abd discomfort, fevers and resp distress and was found to have necrotizing pancreatitis based on CT findings of multiple locations of walled off necrosis. He did undergo CT guided aspiration of the largest fluid collection in the pancreatic tail in which cultures grew enterobacter cloacae complex + VRE.     Objective Data   Containment of urine/stool: Incontinence Program and Indwelling catheter    Current Diet/ Nutrition    Active Diet Order    Active Diet Order  NPO    Output:  I/O last 3 completed shifts:  In: 3298.33 [I.V.:2213.33; NG/GT:365]  Out: 5300 [Urine:5250; Emesis/NG output:50]    Risk Assessment:   Ricci Ricci Score  Av  Min: 12  Max: 12  Ricci Q No Data Recorded     NSRAS No Data Recorded                                  Labs:     Recent Labs  Lab 17  0426   HGB 7.8*   INR 1.49*   WBC 7.2   .0*       Recent Labs  Lab 17  1720 17  1430 17  0038 17  1608 17  1600   CULT Canceled, Test credited Duplicate request PCR WAS ALREADY ORDERED Heavy  growth Enterobacter cloacae complexHeavy growth Enterococcus faecium (VRE)Critical Value/Significant Value called to and read back by Phylicia Lamar RN, @ 6414 01.24.2017 BL*  Culture negative monitoring continues No growth after 3 days No growth after 4 days No growth after 4 days       Physical Exam  Skin assessment:   Focused skin inspection: coccyx,   Full head-to-toe completed due to patient being high risk for pressure injury development secondary to hemiplegia    Wound Location:  coccyx  Wound History: charted as both blanchable and non-blanchable, only blanchable erythema seen on assessment   Measurements (length x width x depth, in cm) 2 small open areas 0.3 x 0.2 x 0 cm and 0.2 x 0.2 x 0 cm  Wound Base:  epidermis surrounded by blanchable erythema  Palpation of the wound bed: normal   Periwound skin: intact and erythema  Color: normal and consistent with surrounding tissue  Temperature: normal   Drainage: none  Description of drainage: none  Odor: none  Pain: no grimacing or signs of discomfort    Interventions  Current support surface: Standard  Atmos Air  Current off-loading measures: Chair cushion and Pillows under calves  Visual inspection of wounds(s) completed.  Wound Care: was done per plan of care.  Supplies: in room  Education provided today: n/a    Discussed plan of care with Nurse  Face to face time: 15 minutes

## 2017-01-25 NOTE — PROVIDER NOTIFICATION
Resident notified about pt's heart rate dropping to 45 bpm, orders received for an EKG that showed pt was in sinus bradycardia, blood pressure stable at 130/80

## 2017-01-25 NOTE — PLAN OF CARE
Problem: Goal Outcome Summary  Goal: Goal Outcome Summary  OT: attempt to see pt x3 today with pt busy in procedure of another caregiver each time.

## 2017-01-25 NOTE — PROGRESS NOTES
SURGICAL ICU PROGRESS NOTE  January 25, 2017      CO-MORBIDITIES:   TBI  Seizure disorder  Hemiplegia  Panhypopituitarism  Chronic tracheostomy    ASSESSMENT: 56yM with TBI resulting in seizure disorder,spastic hemiplegia, and aphasia, v-fib cardiac arrest, panhypopituitarism and chronic trach who presents as a transfer for necrotizing pancreatitis. Fluid collection at the tail of the pancreas was aspirated and grew enterobacter. Patient's home desmopressin was inadvertently not restarted and developed hypernatremia this AM. Will work to correct this with free water and resumption of vasopressin and eventual restarting of desmopressin    PLAN:   Neuro/ pain/ sedation:  - Monitor neurological status. Notify the MD for any acute changes in exam.  - Anti-seizure: Carbamazepine and brivaracetam  - Panhypopituitarism: synthroid, testosterone  - Seroquel for sedation    Pulmonary care:   - Wean FiO2 to keep saturation above 90%.  - Will work toward wean to trach dome     Cardiovascular:    - Monitor hemodynamic status.      GI care:   - NPO.  - Advance tube feeds to goal per protocol  - Meds per J tube  - Appreciate GI recs. Will get CT scan today and plan for cystgastrostomy once Na corrects.     Fluids/ Electrolytes/ Nutrition:   - Hypernatremia found today to 179. Home desmopressin was not restarted on admission.  - Start D5W at 200cc  - Check sodium levels q4h. Goal to decrease sodium by 10mmol per day. Will titrate D5W to achieve that goal.  - Vasopressin started at 2.4/hr  - ICU electrolyte replacement protocol  - No indication for parenteral nutrition.  - Nutrition consulted. Appreciate recs    Renal/ Fluid Balance:     - Will monitor intake and output.  - Condom catheter     Endocrine:    - Decrease steroids to 25mg TID  - SSI     ID/ Antibiotics:  - Erta and linezolid for pancreatic necrosis   - Necrotic pancreatitis with fluid culture growing enterobacter and VRE     Heme:     - No indication for  intervention     Prophylaxis:    - Mechanical prophylaxis for DVT.  - Hep TID  - PPI BID     MSK:     - PT and OT consulted. Appreciate recs.     Lines/ tubes/ drains:  - Trach, PIV x2, G-J tube, L PICC     Disposition:  - Surgical ICU.    Patient seen, findings and plan discussed with Dr. Obrien.    Yoel Montgomery MD  General Surgery PGY-2    ====================================    TODAY'S PROGRESS:   SUBJECTIVE:   Patient appears unchanged neurostatus despite hypernatremia. No pain. Tolerating tube feeds. Voiding to condom cath.    OBJECTIVE:   1. VITAL SIGNS:   Temp:  [97  F (36.1  C)-98.9  F (37.2  C)] 98.9  F (37.2  C)  Heart Rate:  [] 48  Resp:  [10-20] 14  BP: (105-141)/(63-86) 116/64 mmHg  FiO2 (%):  [30 %-50 %] 30 %  SpO2:  [96 %-100 %] 100 %  Ventilation Mode: CMV/AC  FiO2 (%): 30 %  Rate Set (breaths/minute): 14 breaths/min  Tidal Volume Set (mL): 550 mL  PEEP (cm H2O): 5 cmH2O  Pressure Support (cm H2O): 10 cmH2O  Oxygen Concentration (%): 50 %  Resp: 14    2. INTAKE/ OUTPUT:   I/O last 3 completed shifts:  In: 3298.33 [I.V.:2213.33; NG/GT:365]  Out: 5300 [Urine:5250; Emesis/NG output:50]    3. PHYSICAL EXAMINATION:   General: Awake and alert  HEENT: Trach in place  Neuro: Follows commands.  CV: RRR  Pulm: Ventilated on CMV  Abd: Soft; non-tender; non-distended; G-J tube in place; no peritoneal signs  Extremities: WWP    4. INVESTIGATIONS:   Arterial Blood Gases     Recent Labs  Lab 01/22/17  0500 01/21/17  2348   PH 7.56* 7.47*   PCO2 22* 26*   PO2 133* 117*   HCO3 20* 19*     Complete Blood Count     Recent Labs  Lab 01/25/17  0426 01/24/17  0400 01/23/17  1720 01/23/17  1130 01/23/17  1015   WBC 7.2 6.7 8.0  --  7.3   HGB 7.8* 7.4* 7.2* 6.0* 6.0*    117* 115*  --  115*     Basic Metabolic Panel    Recent Labs  Lab 01/25/17  0727 01/25/17  0705 01/25/17  0521 01/25/17  0426 01/24/17  2135 01/24/17  1442 01/24/17  0400  01/23/17  1720   * 175* 171* 172*  --   --  142  --  142    POTASSIUM  --   --  3.3* 3.4 3.3* 3.3* 3.5  < > 3.0*   CHLORIDE  --   --  135* 136*  --   --  108  --  106   CO2  --   --  29 28  --   --  24  --  28   BUN  --   --  16 15  --   --  20  --  26   CR  --   --  0.80 0.82  --   --  0.75  --  0.82   GLC  --   --  138* 127*  --   --  104*  --  100*   < > = values in this interval not displayed.  Liver Function Tests    Recent Labs  Lab 01/25/17  0426 01/23/17  1720 01/22/17  1003 01/22/17  0500 01/21/17  1600   AST  --   --   --   --  21   ALT  --   --   --   --  23   ALKPHOS  --   --   --   --  63   BILITOTAL  --   --   --   --  1.0   ALBUMIN  --   --   --   --  2.1*   INR 1.49* 1.53* 2.58* 2.48*  --      Pancreatic Enzymes    Recent Labs  Lab 01/22/17  0500 01/21/17  1600   LIPASE 291 789*     Coagulation Profile    Recent Labs  Lab 01/25/17  0426 01/23/17  1720 01/22/17  1003 01/22/17  0500   INR 1.49* 1.53* 2.58* 2.48*   PTT  --   --  52*  --          5. RADIOLOGY:   Recent Results (from the past 24 hour(s))   CT Abdomen Pelvis w/o Contrast    Narrative    CT ABDOMEN PELVIS W/O CONTRAST 1/25/2017 9:34 AM    History: necrotizing pancreatitis, planning for EUS/necrosectomy    Comparison: 1/22/2017, 1/21/2017    Technique: Helical CT images were obtained from the dome of the liver  to the since the without contrast.  Images were reconstructed in  multiple planes using multiple reconstruction algorithms.     Findings:  Lung bases: Micronodularity in the left lung base, with atelectasis  and/or consolidation in both lung bases. Small left pleural effusion.  Partially visualized central venous catheter with tip at the superior  atriocaval junction.    Abdomen and pelvis: GJ tube with tip in the proximal jejunum. The  majority of the pancreatic parenchyma is replaced by a heterogeneous  fat containing collection measuring approximately 11.5 x 4.8 x 5.7 cm  with multiple apparent loculations. In the pancreatic head, there  appears to be a second separate heterogeneous  collection measuring 2.9  cm in diameter. At the root of the mesentery inferior to the duodenal  sweep (series 5 image 259), there is a 4.6 x 2.0 cm more simple  appearing fluid collection with intermediate density. Along the  inferior margin of the greater curvature of the stomach adjacent to  the G-tube, there is a 7.3 x 3.6 x 5.1 cm collection, which is larger  than on the prior exam, and shows intermediate density. No dilated  loops of small or large bowel. Mild, likely reactive lymphadenopathy  in the abdomen. Postoperative evidence of partial hepatectomy. Fluid  in the rectovesical pouch. Noncontrast evaluation kidneys (except for  a nonobstructing 2 mm stone which is stable since 7/30/2013), spleen,  urinary bladder, prostate, small bowel, large bowel are unremarkable.    Subcutaneous soft tissues: Tiny fat-containing umbilical hernia. Mild  anasarca. Partially visualized left-sided PICC.    Bones: Mild multilevel degenerative change of the visualized spine  without aggressive appearing bony lesion or acute fracture. Several  chronic appearing compression deformities of the superior endplates of  the upper lumbar spine      Impression    Impression:   1. The pancreatic parenchyma is nearly completely replaced by a  heterogeneous, multilobulated necrotic collection which is not  significantly changed in size when compared to 1/21/2017.  2. Separate collection along the inferior/lateral margin of the  stomach at the level of the G-tube is slightly larger than on  1/21/2017, now measuring approximately 7.3 x 3.6 x 5.1 cm.  3. Other collections in the pancreatic head and root of the mesentery  are not significantly changed.  4. Mild ascites along the spleen and in the pelvis.  5. Reticulonodular appearance of the right lung base with associated  consolidation, concerning for infection or aspiration.  6. Small left pleural effusion with associated left basilar  atelectasis and/or consolidation.    KADE MENJIVAR        =========================================

## 2017-01-25 NOTE — PLAN OF CARE
Problem: Goal Outcome Summary  Goal: Goal Outcome Summary  D: Adm 1/23 from OSH w/necrotizing pancreatitis.  A/I:  Neuro: Restless, agitated, confused throughout night, given 2 doses of seroquel without any notable change, MD contacted for restraint, placed restraint on LUE and given extra dose seroquel. Pt had been swinging arm trying to hit, disconnect self from vent x2, took off pulse ox x2, and pulled off condom catheter. Mitt was unsuccessful, was still able to rub off equipment with mitt.   CV: Stable. EKG done after extra seroquel was ordered results=NSR w/sinus arrhythmia, QTc prolonged at 459ms.  Resp: CMV overnight. FiO2 weaned to 30%. Failed PS trial this morning, became immediately apneic.   GI: TF remain at 30mL/hr, unable to advance d/t lytes. 2 stools.   : Heavily incontinent during night, when finally able to successfully measure urine via condom cath, found pt is now producing up to 1L/hr of clear light yellow urine.  Labs: Sodium 172: Redrawn and MD notified: orders placed for urine osmolality, D5W@200mL/hr, Hourly Na checks, and monitor for seizure activity and changes in mental status.   Abscess culture from OSH growing MD BIN notified.  P: Await labs. Continue to monitor and provide critical cares.

## 2017-01-26 ENCOUNTER — APPOINTMENT (OUTPATIENT)
Dept: OCCUPATIONAL THERAPY | Facility: CLINIC | Age: 57
DRG: 405 | End: 2017-01-26
Attending: INTERNAL MEDICINE
Payer: MEDICARE

## 2017-01-26 LAB
ABO + RH BLD: NORMAL
ABO + RH BLD: NORMAL
ANION GAP SERPL CALCULATED.3IONS-SCNC: 9 MMOL/L (ref 3–14)
BLD GP AB SCN SERPL QL: NORMAL
BLD PROD TYP BPU: NORMAL
BLD PROD TYP BPU: NORMAL
BLD UNIT ID BPU: 0
BLOOD BANK CMNT PATIENT-IMP: NORMAL
BLOOD PRODUCT CODE: NORMAL
BPU ID: NORMAL
BUN SERPL-MCNC: 18 MG/DL (ref 7–30)
CALCIUM SERPL-MCNC: 7.2 MG/DL (ref 8.5–10.1)
CHLORIDE SERPL-SCNC: 127 MMOL/L (ref 94–109)
CO2 SERPL-SCNC: 26 MMOL/L (ref 20–32)
CREAT SERPL-MCNC: 0.75 MG/DL (ref 0.66–1.25)
ERYTHROCYTE [DISTWIDTH] IN BLOOD BY AUTOMATED COUNT: 20.2 % (ref 10–15)
GFR SERPL CREATININE-BSD FRML MDRD: ABNORMAL ML/MIN/1.7M2
GLUCOSE SERPL-MCNC: 108 MG/DL (ref 70–99)
HCT VFR BLD AUTO: 23.2 % (ref 40–53)
HGB BLD-MCNC: 6.6 G/DL (ref 13.3–17.7)
HGB BLD-MCNC: 7.6 G/DL (ref 13.3–17.7)
INTERPRETATION ECG - MUSE: NORMAL
MAGNESIUM SERPL-MCNC: 2 MG/DL (ref 1.6–2.3)
MAGNESIUM SERPL-MCNC: 2.2 MG/DL (ref 1.6–2.3)
MCH RBC QN AUTO: 23.3 PG (ref 26.5–33)
MCHC RBC AUTO-ENTMCNC: 28.4 G/DL (ref 31.5–36.5)
MCV RBC AUTO: 82 FL (ref 78–100)
NUM BPU REQUESTED: 1
PHOSPHATE SERPL-MCNC: 2.2 MG/DL (ref 2.5–4.5)
PHOSPHATE SERPL-MCNC: 2.6 MG/DL (ref 2.5–4.5)
PLATELET # BLD AUTO: 141 10E9/L (ref 150–450)
POTASSIUM SERPL-SCNC: 3 MMOL/L (ref 3.4–5.3)
POTASSIUM SERPL-SCNC: 3.4 MMOL/L (ref 3.4–5.3)
RBC # BLD AUTO: 2.83 10E12/L (ref 4.4–5.9)
SODIUM SERPL-SCNC: 153 MMOL/L (ref 133–144)
SODIUM SERPL-SCNC: 154 MMOL/L (ref 133–144)
SODIUM SERPL-SCNC: 156 MMOL/L (ref 133–144)
SODIUM SERPL-SCNC: 159 MMOL/L (ref 133–144)
SODIUM SERPL-SCNC: 162 MMOL/L (ref 133–144)
SODIUM SERPL-SCNC: NORMAL MMOL/L (ref 133–144)
SPECIMEN EXP DATE BLD: NORMAL
TRANSFUSION STATUS PATIENT QL: NORMAL
TRANSFUSION STATUS PATIENT QL: NORMAL
WBC # BLD AUTO: 8.1 10E9/L (ref 4–11)

## 2017-01-26 PROCEDURE — 85018 HEMOGLOBIN: CPT | Performed by: SURGERY

## 2017-01-26 PROCEDURE — 97167 OT EVAL HIGH COMPLEX 60 MIN: CPT | Mod: GO | Performed by: OCCUPATIONAL THERAPIST

## 2017-01-26 PROCEDURE — A9270 NON-COVERED ITEM OR SERVICE: HCPCS | Mod: GY

## 2017-01-26 PROCEDURE — 84132 ASSAY OF SERUM POTASSIUM: CPT | Performed by: SURGERY

## 2017-01-26 PROCEDURE — 20000004 ZZH R&B ICU UMMC

## 2017-01-26 PROCEDURE — 3E043XZ INTRODUCTION OF VASOPRESSOR INTO CENTRAL VEIN, PERCUTANEOUS APPROACH: ICD-10-PCS | Performed by: SURGERY

## 2017-01-26 PROCEDURE — P9016 RBC LEUKOCYTES REDUCED: HCPCS | Performed by: SURGERY

## 2017-01-26 PROCEDURE — 25000132 ZZH RX MED GY IP 250 OP 250 PS 637: Mod: GY | Performed by: SURGERY

## 2017-01-26 PROCEDURE — 83735 ASSAY OF MAGNESIUM: CPT | Performed by: SURGERY

## 2017-01-26 PROCEDURE — 25000125 ZZHC RX 250: Performed by: SURGERY

## 2017-01-26 PROCEDURE — A9270 NON-COVERED ITEM OR SERVICE: HCPCS | Mod: GY | Performed by: SURGERY

## 2017-01-26 PROCEDURE — 25000128 H RX IP 250 OP 636: Performed by: PHYSICIAN ASSISTANT

## 2017-01-26 PROCEDURE — 97530 THERAPEUTIC ACTIVITIES: CPT | Mod: GO | Performed by: OCCUPATIONAL THERAPIST

## 2017-01-26 PROCEDURE — 40000275 ZZH STATISTIC RCP TIME EA 10 MIN

## 2017-01-26 PROCEDURE — 40000133 ZZH STATISTIC OT WARD VISIT: Performed by: OCCUPATIONAL THERAPIST

## 2017-01-26 PROCEDURE — 94003 VENT MGMT INPAT SUBQ DAY: CPT

## 2017-01-26 PROCEDURE — 97110 THERAPEUTIC EXERCISES: CPT | Mod: GO | Performed by: OCCUPATIONAL THERAPIST

## 2017-01-26 PROCEDURE — 84295 ASSAY OF SERUM SODIUM: CPT | Performed by: SURGERY

## 2017-01-26 PROCEDURE — 85027 COMPLETE CBC AUTOMATED: CPT | Performed by: SURGERY

## 2017-01-26 PROCEDURE — 80048 BASIC METABOLIC PNL TOTAL CA: CPT | Performed by: SURGERY

## 2017-01-26 PROCEDURE — 99291 CRITICAL CARE FIRST HOUR: CPT | Mod: GC | Performed by: SURGERY

## 2017-01-26 PROCEDURE — 25000125 ZZHC RX 250: Performed by: NURSE PRACTITIONER

## 2017-01-26 PROCEDURE — 25000132 ZZH RX MED GY IP 250 OP 250 PS 637: Mod: GY

## 2017-01-26 PROCEDURE — 25000125 ZZHC RX 250

## 2017-01-26 PROCEDURE — 84100 ASSAY OF PHOSPHORUS: CPT | Performed by: SURGERY

## 2017-01-26 PROCEDURE — 27210436 ZZH NUTRITION PRODUCT SEMIELEM INTERMED CAN

## 2017-01-26 RX ORDER — OXYCODONE HCL 5 MG/5 ML
5-10 SOLUTION, ORAL ORAL EVERY 4 HOURS PRN
Status: DISCONTINUED | OUTPATIENT
Start: 2017-01-26 | End: 2017-01-30

## 2017-01-26 RX ORDER — LINEZOLID 100 MG/5ML
600 GRANULE, FOR SUSPENSION ORAL EVERY 12 HOURS SCHEDULED
Status: DISCONTINUED | OUTPATIENT
Start: 2017-01-26 | End: 2017-01-30

## 2017-01-26 RX ORDER — DEXTROSE MONOHYDRATE 50 MG/ML
INJECTION, SOLUTION INTRAVENOUS CONTINUOUS
Status: DISCONTINUED | OUTPATIENT
Start: 2017-01-26 | End: 2017-01-27

## 2017-01-26 RX ADMIN — POTASSIUM CHLORIDE 40 MEQ: 1.5 POWDER, FOR SOLUTION ORAL at 06:21

## 2017-01-26 RX ADMIN — ACETAMINOPHEN 650 MG: 325 TABLET, FILM COATED ORAL at 21:47

## 2017-01-26 RX ADMIN — POTASSIUM PHOSPHATE, MONOBASIC AND POTASSIUM PHOSPHATE, DIBASIC 15 MMOL: 224; 236 INJECTION, SOLUTION INTRAVENOUS at 23:17

## 2017-01-26 RX ADMIN — PANCRELIPASE 24000 UNITS: 24000; 76000; 120000 CAPSULE, DELAYED RELEASE PELLETS ORAL at 13:01

## 2017-01-26 RX ADMIN — HEPARIN SODIUM 5000 UNITS: 5000 INJECTION, SOLUTION INTRAVENOUS; SUBCUTANEOUS at 02:36

## 2017-01-26 RX ADMIN — Medication 40 MG: at 08:16

## 2017-01-26 RX ADMIN — ACETAMINOPHEN 650 MG: 325 TABLET, FILM COATED ORAL at 02:36

## 2017-01-26 RX ADMIN — VASOPRESSIN 2.4 UNITS/HR: 20 INJECTION, SOLUTION INTRAMUSCULAR; SUBCUTANEOUS at 13:00

## 2017-01-26 RX ADMIN — HEPARIN SODIUM 5000 UNITS: 5000 INJECTION, SOLUTION INTRAVENOUS; SUBCUTANEOUS at 18:15

## 2017-01-26 RX ADMIN — CALCIUM CARBONATE 1250 MG: 1250 SUSPENSION ORAL at 08:16

## 2017-01-26 RX ADMIN — PANCRELIPASE 24000 UNITS: 24000; 76000; 120000 CAPSULE, DELAYED RELEASE PELLETS ORAL at 04:03

## 2017-01-26 RX ADMIN — PANCRELIPASE 24000 UNITS: 24000; 76000; 120000 CAPSULE, DELAYED RELEASE PELLETS ORAL at 18:01

## 2017-01-26 RX ADMIN — SODIUM BICARBONATE 325 MG: 325 TABLET ORAL at 08:09

## 2017-01-26 RX ADMIN — DEXTROSE MONOHYDRATE: 50 INJECTION, SOLUTION INTRAVENOUS at 22:00

## 2017-01-26 RX ADMIN — SODIUM BICARBONATE 325 MG: 325 TABLET ORAL at 21:47

## 2017-01-26 RX ADMIN — Medication 2 G: at 21:47

## 2017-01-26 RX ADMIN — POTASSIUM CHLORIDE 20 MEQ: 1.5 POWDER, FOR SOLUTION ORAL at 21:47

## 2017-01-26 RX ADMIN — SODIUM BICARBONATE 325 MG: 325 TABLET ORAL at 04:03

## 2017-01-26 RX ADMIN — Medication 40 MG: at 22:01

## 2017-01-26 RX ADMIN — HYDROCORTISONE SODIUM SUCCINATE 25 MG: 100 INJECTION, POWDER, FOR SOLUTION INTRAMUSCULAR; INTRAVENOUS at 02:36

## 2017-01-26 RX ADMIN — POTASSIUM CHLORIDE 20 MEQ: 1.5 POWDER, FOR SOLUTION ORAL at 08:16

## 2017-01-26 RX ADMIN — CARBAMAZEPINE 200 MG: 100 TABLET, CHEWABLE ORAL at 08:16

## 2017-01-26 RX ADMIN — SODIUM BICARBONATE 325 MG: 325 TABLET ORAL at 18:01

## 2017-01-26 RX ADMIN — HEPARIN SODIUM 5000 UNITS: 5000 INJECTION, SOLUTION INTRAVENOUS; SUBCUTANEOUS at 13:02

## 2017-01-26 RX ADMIN — QUETIAPINE 50 MG: 50 TABLET, FILM COATED ORAL at 21:47

## 2017-01-26 RX ADMIN — ACETAMINOPHEN 650 MG: 325 TABLET, FILM COATED ORAL at 18:00

## 2017-01-26 RX ADMIN — PANCRELIPASE 48000 UNITS: 24000; 76000; 120000 CAPSULE, DELAYED RELEASE PELLETS ORAL at 21:56

## 2017-01-26 RX ADMIN — ACETAMINOPHEN 650 MG: 325 TABLET, FILM COATED ORAL at 08:16

## 2017-01-26 RX ADMIN — CARBAMAZEPINE 200 MG: 100 TABLET, CHEWABLE ORAL at 13:02

## 2017-01-26 RX ADMIN — POTASSIUM PHOSPHATE, MONOBASIC AND POTASSIUM PHOSPHATE, DIBASIC 9 MMOL: 224; 236 INJECTION, SOLUTION INTRAVENOUS at 18:02

## 2017-01-26 RX ADMIN — CALCIUM CARBONATE 1250 MG: 1250 SUSPENSION ORAL at 13:02

## 2017-01-26 RX ADMIN — MELATONIN TAB 3 MG 6 MG: 3 TAB at 21:47

## 2017-01-26 RX ADMIN — CARBAMAZEPINE 200 MG: 100 TABLET, CHEWABLE ORAL at 18:03

## 2017-01-26 RX ADMIN — POTASSIUM PHOSPHATE, MONOBASIC AND POTASSIUM PHOSPHATE, DIBASIC 10 MMOL: 224; 236 INJECTION, SOLUTION INTRAVENOUS at 06:51

## 2017-01-26 RX ADMIN — PANCRELIPASE 24000 UNITS: 24000; 76000; 120000 CAPSULE, DELAYED RELEASE PELLETS ORAL at 08:09

## 2017-01-26 RX ADMIN — Medication 10 MG: at 21:57

## 2017-01-26 RX ADMIN — BRIVARACETAM 50 MG: 50 INJECTION, SUSPENSION INTRAVENOUS at 20:15

## 2017-01-26 RX ADMIN — Medication 81 MG: at 08:16

## 2017-01-26 RX ADMIN — LINEZOLID 600 MG: 600 INJECTION, SOLUTION INTRAVENOUS at 08:19

## 2017-01-26 RX ADMIN — LEVOTHYROXINE SODIUM 137 MCG: 137 TABLET ORAL at 08:16

## 2017-01-26 RX ADMIN — POTASSIUM PHOSPHATE, MONOBASIC AND POTASSIUM PHOSPHATE, DIBASIC 9 MMOL: 224; 236 INJECTION, SOLUTION INTRAVENOUS at 21:58

## 2017-01-26 RX ADMIN — CALCIUM CARBONATE 1250 MG: 1250 SUSPENSION ORAL at 18:00

## 2017-01-26 RX ADMIN — ERTAPENEM SODIUM 1 G: 1 INJECTION, POWDER, LYOPHILIZED, FOR SOLUTION INTRAMUSCULAR; INTRAVENOUS at 19:16

## 2017-01-26 RX ADMIN — LINEZOLID 600 MG: 100 SUSPENSION ORAL at 21:58

## 2017-01-26 RX ADMIN — Medication 15 ML: at 08:16

## 2017-01-26 RX ADMIN — QUETIAPINE 50 MG: 50 TABLET, FILM COATED ORAL at 13:06

## 2017-01-26 RX ADMIN — SODIUM BICARBONATE 325 MG: 325 TABLET ORAL at 13:01

## 2017-01-26 RX ADMIN — CARBAMAZEPINE 200 MG: 100 TABLET, CHEWABLE ORAL at 21:59

## 2017-01-26 NOTE — PLAN OF CARE
Problem: Goal Outcome Summary  Goal: Goal Outcome Summary  OT 4A: OT eval completed and POC established. Pt instructed in bilateral rolling to don sling for dependent lift transfer to chair. Pt requires max A to roll R and total A to roll L. Able to maintain neck co-contraction with min A during transfer. Pt had difficulty tolerating hip flexion >80 resulting in spasticity and posterior lean; utilized reclined position with feet elevated and pt able to remain in chair at end of session. Instructed pt in use of call light and communication board; demonstrates ability to use standard call light with set-up however difficulty with using channel buttons noted. Pt able to write his name with min A using communication board. Encouraged pt to continue to express his needs.  Provided PROM with low-load long-duration technique to RUE to increase ROM and prevent further contractures; limited tolerance.  Recommend discharge to TCU, when medically appropriate, with continued OT in sub-acute setting.

## 2017-01-26 NOTE — PROGRESS NOTES
" 01/26/17 1338   Quick Adds   Type of Visit Initial Occupational Therapy Evaluation   Living Environment   Lives With parent(s)   Living Arrangements house   Transportation Available family or friend will provide;car   Living Environment Comment Will clarify with family when available. Pt able to confirm some information with yes/no nods or thumbs up/down.    Self-Care   Dominant Hand left   Usual Activity Tolerance fair   Current Activity Tolerance poor   Equipment Currently Used at Home bath bench;grab bar;power chair;wheelchair   Activity/Exercise/Self-Care Comment Pt has been participating with OT and PT at [facility] prio to admission.    Functional Level Prior   Ambulation 4-->completely dependent   Transferring 3-->assistive equipment and person   Toileting 3-->assistive equipment and person   Bathing 3-->assistive equipment and person   Dressing 3-->assistive equipment and person   Eating 4-->completely dependent   Communication 2-->difficulty speaking (not related to language barrier)   Swallowing 2-->difficulty swallowing foods   Which of the above functional risks had a recent onset or change? ambulation;transferring;toileting;bathing;dressing;eating;swallowing   Prior Functional Level Comment PLOF and therapy history to be verified with pt's family when available.  Per PT evaluation from [facility] 12/8/2016 Pt was admitted from TCU at that time \" Per mom, pt was transferring wtih SBA and use of grab bars for bed <> w/c and toilet transfers. Has roll in shower wt flip down bath bench, mom assists with washing hair and set up for showers and pt does body washing. Pt ablet o assist with clothing managment for dressing/memo cares but mom assists. Uses manual w/c indep wtih L UE/LE in the home, has a power chair for community\"  Pt was dependent with all mobility upon that evaluation and d/c to LTACH. Unclear progress made in LTACH. Pt may be progressing toward a new baseline functional status.    General " Information   Onset of Illness/Injury or Date of Surgery - Date 01/23/17   Referring Physician Yoel Montgomery MD   Patient/Family Goals Statement Per previous therapy evals, pt's mother would like to care for pt at home.  Will verify home set-up and goals for therapy with his mother. Pt reports today he wants less pain and spasticity.    Additional Occupational Profile Info/Pertinent History of Current Problem Per chart review: 56 year old male with a history of TBI with resultant seizures, aphasia, right sided hemiplegia, v-fib cardiac arrest, panhypopituitarism, and chronic tracheostomy who is transferred from [facility] where he was admitted 1/21 from his LTACH with increasing abd discomfort, fevers and resp distress and was found to have necrotizing pancreatitis based on CT findings of multiple locations of walled off necrosis. He did undergo CT guided aspiration of the largest fluid collection in the pancreatic tail in which cultures grew enterobacter cloacae complex + VRE.    Precautions/Limitations oxygen therapy device and L/min;fall precautions  (vented through trach on CPAP)   Cognitive Status Examination   Orientation person   Level of Consciousness alert;agitated  (agitated with pain and increased tone)   Able to Follow Commands success, 1-step commands;mild impairment   Cognitive Comment Hx of TBI   Pain Assessment   Patient Currently in Pain No   Integumentary/Edema   Integumentary/Edema no deficits were identifed   Range of Motion (ROM)   ROM Comment Contractures noted in R hand/UE at all DIP joints, thumb IP, elbow in 60 degrees flexion and shoulder in internal rotation. Pt tolerates limited PROM.    Strength   Strength Comments LUE grossly 4/5; RUE unable to test d/t hypertonicity   Muscle Tone Assessment   Muscle Tone Quick Adds RUE;RLE   Muscle Tone Assessment - RUE severely increased tone;hypertonic   Muscle Tone Assessment - RLE mildly increased tone;hypertonic    Mobility   Bed Mobility Bed mobility skill: Rolling/Turning   Bed Mobility Skill: Rolling/Turning   Level of [place] - Bed Mobility Skill Rolling Turning maximum assist (25% patients effort)   Assistive Device:  Rolling/Turning bed rails   Transfer Skill: Bed to Chair/Chair to Bed   Level of [place]: Bed to Chair dependent (less than 25% patients effort)   Balance   Balance Quick Add Sitting balance: static;Sitting balance: dynamic   Sitting Balance: Static poor balance   Sitting Balance: Dynamic poor balance   Balance Comments Limited d/t hypertonicity. Pt develops high tone with hip flexion in sitting and only tolerates chair with reclined position.    Grooming   Level of [place]: Grooming maximum assist (25% patients effort)   Instrumental Activities of Daily Living (IADL)   IADL Comments Pt's mother completes IADL at baseline.    Activities of Daily Living Analysis   Impairments Contributing to Impaired Activities of Daily Living motor control impaired;muscle tone abnormal;pain;strength decreased;ROM decreased;coordination impaired;cognition impaired;balance impaired   General Therapy Interventions   Planned Therapy Interventions ADL retraining;prosthetic fitting/training;ROM;stretching   Clinical Impression   Criteria for Skilled Therapeutic Interventions Met yes, treatment indicated   OT Diagnosis Decreased ADL independence   Influenced by the following impairments motor control impaired;muscle tone abnormal;pain;strength decreased;ROM decreased;coordination impaired;cognition impaired;balance impaired   Assessment of Occupational Performance 5 or more Performance Deficits   Identified Performance Deficits motor control impaired;muscle tone abnormal;pain;strength decreased;ROM decreased;coordination impaired;cognition impaired;balance impaired   Clinical Decision Making (Complexity) High complexity   Therapy Frequency 5 times/wk   Predicted Duration of Therapy Intervention (days/wks) 2 weeks  "  Anticipated Equipment Needs at Discharge (will continue to assess)   Anticipated Discharge Disposition Long Term Care Facility;Transitional Care Facility   Risks and Benefits of Treatment have been explained. Yes   Patient, Family & other staff in agreement with plan of care Yes   Clinical Impression Comments Will verify care plan with pt's mother when available. Recommend continued IP rehab at this time unless pt's home is accessible and his current equipment is appropriate for current level of function.    [facility] AM-PAC TM \"6 Clicks\"   2016, Trustees of [facility], under license to [employer].  All rights reserved.   6 Clicks Short Forms Daily Activity Inpatient Short Form   [facility] AM-PAC  \"6 Clicks\" Daily Activity Inpatient Short Form   1. Putting on and taking off regular lower body clothing? 1 - Total   2. Bathing (including washing, rinsing, drying)? 1 - Total   3. Toileting, which includes using toilet, bedpan or urinal? 1 - Total   4. Putting on and taking off regular upper body clothing? 1 - Total   5. Taking care of personal grooming such as brushing teeth? 1 - Total   6. Eating meals? 1 - Total   Daily Activity Raw Score (Score out of 24.Lower scores equate to lower levels of function) 6   Total Evaluation Time   Total Evaluation Time (Minutes) 14     "

## 2017-01-26 NOTE — PROGRESS NOTES
01/26/17 1300   Visit Information   Visit Made By Priest Solis   Type of Visit Follow-up;On-call;Staff consultation/triage   Visited Patient   Interventions   Plan of Care Review   With patient/family/proxy   Basic Spiritual Interventions   Assessment of spiritual needs/resources;Reflective conversation;Prayer   Advanced Assessments/Interventions   Presenting Concerns/Issues Spiritual/Yazidism/emotional support   SPIRITUAL HEALTH SERVICES Progress Note  [facility] 4A       DATA:    Follow up visit with Keyon (pt) per epic request of hospital . After consultation with his nurse, I visited with Keyon whose speech is limited because of trach. Keyon was alert during this visit and welcomed prayer.        INTERVENTION:    Consultation with pt's nurse; assessed pt's needs of SHS; offered spiritual support and shared a prayer.       OUTCOME:    Keyon expressed gratitude.       PLAN:    Yazidi priests will continue to f/u this pt 1-2x/wk while in the ICU.                                                                                                                                             Father Rolando Solis

## 2017-01-26 NOTE — PHARMACY-ADMISSION MEDICATION HISTORY
Admission medication history interview status for the 2017 admission is complete. See Epic admission navigator for allergy information, pharmacy, prior to admission medications and immunization status.     Medication history interview sources:  Patient's previous medication history note per [facility] request    Changes made to PTA medication list (reason)  Added:   - Neutra-phos  - Desmopressin  Deleted:   -Bricaracetam 50 mg/5 ml injection: inject 5 mls into the vein 2 times daily  - Clotrimazole: apply topically 2 times daily  - Hydrocortisone sodium succinate  mg injection: inject 100 mg into the vein every 8 hours  - meropenum 500 mg vial: inject 500 mg into the vein every 6 hours  - Midazolam 1mg/ml injection: inject 2-4 mls into the vein every hour as needed for sedation  - Aquaphor: apply topically daily  - Norepinephrine 0.064 mg/ml solution: inject 2.22-29.6 mcg/min into the vein continuously  - Senna-docusate 8.6-100 m-2 tablets by mouth 2 times daily as needed  - Vancomycin 1000 mg in dextrose 5% 200 ml: inject 200 mls into the vein every 12 hours  - Vasopressin 40 units in D5W 40 mls: inject 2.4 units/hr into the vein continuously    Changed:   - None    Additional medication history information (including reliability of information, actions taken by pharmacist):  - Was instructed to use the patients old medication history note by pharmacist      Prior to Admission medications    Medication Sig Last Dose Taking? Auth Provider   DESMOPRESSIN ACETATE PO Take 100 mcg by mouth once 2017 at Unknown time Yes Unknown, Entered By History   potassium & sodium phosphates (NEUTRA-PHOS) 280-160-250 MG Packet Take 1 packet by mouth 4 times daily 2017 at Unknown time Yes Unknown, Entered By History   levothyroxine (SYNTHROID/LEVOTHROID) 137 MCG tablet 1 tablet (137 mcg) by Per Feeding Tube route every morning (before breakfast) 2017 at Unknown time Yes Shaneka Clark PA-C   pantoprazole  (PROTONIX) 2 mg/mL suspension 20 mLs (40 mg) by Per Feeding Tube route 2 times daily 1/21/2017 at Unknown time Yes Shaneka Clark PA-C   calcium carbonate 1250 (500 CA) MG/5ML SUSP suspension Take 1,250 mg by mouth 3 times daily (with meals) 1/21/2017 at Unknown time Yes Unknown, Entered By History   Brivaracetam (BRIVIACT) 10 MG/ML solution Take 50 mg by mouth 2 times daily 1/21/2017 at Unknown time Yes Unknown, Entered By History   melatonin 1 MG TABS tablet Take 6 tablets (6 mg) by mouth At Bedtime 1/20/2017 at Unknown time Yes Alicia Roca APRN CNP   aspirin 10 mg/mL Take 8.1 mLs (81 mg) by mouth daily 1/21/2017 at Unknown time Yes Alicia Roca APRN CNP   Heparin Sodium, Porcine, (HEPARIN SODIUM PF) 5000 UNIT/0.5ML injection Inject 0.5 mLs (5,000 Units) Subcutaneous every 8 hours 1/21/2017 at Unknown time Yes Alicia Roca APRN CNP   bisacodyl (DULCOLAX) 10 MG Suppository Place 1 suppository (10 mg) rectally daily 1/20/2017 at Unknown time Yes Alicia Roca APRN CNP   carBAMazepine (TEGRETOL) 100 MG chewable tablet 200 mg by Per G Tube route 4 times daily 0600, 1100, 1500, 1900. Administer 1 hour before and 1 hour after tube feeding 1/21/2017 at Unknown time Yes Unknown, Entered By History   testosterone cypionate (DEPOTESTOTERONE CYPIONATE) 200 MG/ML injection Inject 200 mg into the muscle once a week Friday 1/13/2017 at Unknown time Yes Unknown, Entered By History   meropenem (MERREM) 500 MG vial Inject 500 mg into the vein every 6 hours   Shaneka Clark PA-C   Hydrocortisone Sod Succinate (SOLU-CORTEF IJ) Inject 75 mg into the vein every 6 hours Ordered  but never given Unknown at Not given  Unknown, Entered By History   miconazole (MICATIN; MICRO GUARD) 2 % powder Apply topically every hour as needed for other (topical candidiasis) Unknown at Unknown time  Roca, Alicia Anali, APRN CNP   polyethylene glycol (MIRALAX/GLYCOLAX) Packet Take 17 g by mouth 2 times daily as needed  (constipation) Unknown at Unknown time  Alicia Roca APRN CNP   protein modular (PROSTAT SUGAR FREE) 1 packet by Per Feeding Tube route 2 times daily Unknown at Unknown time  Alicia Roca APRN CNP   ACETAMINOPHEN  mg by Per Feeding Tube route every 4 hours as needed for pain Unknown at Unknown time  Unknown, Entered By History         Medication history completed by: KITTY

## 2017-01-26 NOTE — CONSULTS
Social Work: Assessment with Discharge Plan    Patient Name:  Keyon Farias  :  1960  Age:  56 year old  MRN:  [id number]  Risk/Complexity Score:     Completed assessment with:  Pt's mother Savannah, chart review    Presenting Information   Reason for Referral:  insurance questions  Date of Intake:  2017  Referral Source:  Nurse  Decision Maker:  Pt's mother Savannah Farias ([phone])  Alternate Decision Maker:  Pt's sister Stephania Babin is legal NOK following his mother.  Health Care Directive:  Health Care Directive Agent (if patient not able to make decisions)  Living Situation:  pt was re-admitted from [facility].  He has had several admissions since  and has been between [facility], [facility], [facility], and [facility].  Prior to recent admissions, pt was living with his mother in her house in [place] where she was his caregiver.  Previous Functional Status:  Assistance with all ADLs including ambulation (wheelchair), transfers, toileting, bathing, dressing, and eating.  Prior to recent admissions, pt was attending a day program during the week and was working two days a week as part of the day program.  Pt was able to wheel himself around in his wheelchair, per mother's report.  Patient and family understanding of hospitalization:  Restorative  Cultural/Language/Spiritual Considerations:  Yazidism  Adjustment to Illness:  Unable to assess pt; pt's mother Savannah appears to be coping appropriately though endorses feeling overwhelmed with lengthy admissions.    Physical Health  Reason for Admission:  No diagnosis found.  Services Needed/Recommended:  Other:  likely will need to return to [facility] since pt was placed back on the [facility].    Mental Health/Chemical Dependency  Diagnosis:  N/A  Support/Services in Place:  N/A  Services Needed/Recommended:  N/A    Support System  Significant relationship at present time:  N/A  Family of origin is available for  "support:  Yes, pt lives with his mother Savannah at baseline and she has been his caregiver.  She is pt's main support.  Pt's sister Stephania is also supportive and available.  Stephania lives three houses down from pt's mother.  Other support available:  N/A  Gaps in support system:  none  Patient is caregiver to:  None     Provider Information   Primary Care Physician:  Julee Roberson   [phone]   Clinic:  [place] [address]    / [facility] [address]      :  unknown    Financial   Income Source:  SSI, on disability  Financial Concerns:  None mentioned.  Though pt's mother does state that pt's insurance is changing from [employer] MA to straight MA around the beginning of April.  Pt  Has a $416 spend down from January and this will be his monthly premium amount.  Pt's [facility] case # is [id number] and team # is [id number].  Insurance:    Payor/Plan Subscriber Name Rel Member # Group #   MEDICARE - MEDICARE BERT BARAJAS  [id number]       ATTN CLAIMS, [address]   MEDICA - MEDICA ACCES* BERT BARAJAS  [id number]      [address] [address]       Discharge Plan   Patient and family discharge goal:  Did not specifically discuss, but likely will return to [facility].  Pt's mother states that [facility] provided \"terrible care\" and the rehab was a \"joke.\"    Provided education on discharge plan:  YES  Patient agreeable to discharge plan:  YES  Barriers to discharge:  Medical readiness, new pancreatitis.    Discharge Recommendations   Anticipated Disposition:  Facility:  likely will return to [facility]     Additional comments   Placed call to pt's mother to address insurance concerns.  She relays pt's insurance is switching from [employer] MA to straight MA, though appears that his ID # will remain the same.  Answered questions and offered support.  She is understandably struggling with pt's multiple readmissions, but states that she has good support from her dtr Stephania.    GILMER Byrd, " [facility]  Adult ICU Clinical   Pager [phone]

## 2017-01-26 NOTE — PROGRESS NOTES
SURGICAL ICU PROGRESS NOTE  January 26, 2017      CO-MORBIDITIES:   TBI  Seizure disorder  Hemiplegia  Panhypopituitarism  Chronic tracheostomy    ASSESSMENT: 56yM with TBI resulting in seizure disorder,spastic hemiplegia, and aphasia, v-fib cardiac arrest, panhypopituitarism and chronic trach who presents as a transfer for necrotizing pancreatitis. Fluid collection at the tail of the pancreas was aspirated and grew enterobacter. Patient's home desmopressin was inadvertently not restarted and developed hypernatremia this AM. Will continue to correct this with free water and vasopressin and eventual restarting of desmopressin. Patient otherwise doing well except new anemia today.    PLAN:   Neuro/ pain/ sedation:  - Monitor neurological status. Notify the MD for any acute changes in exam.  - Anti-seizure: Carbamazepine and brivaracetam  - Panhypopituitarism: synthroid, testosterone  - Seroquel for sedation    Pulmonary care:   - Wean FiO2 to keep saturation above 90%.  - Will work toward wean to trach dome     Cardiovascular:    - Monitor hemodynamic status.      GI care:   - NPO.  - Advance tube feeds to goal per protocol  - Meds per J tube  - Appreciate GI recs. Will get CT scan today and plan for cystgastrostomy once Na corrects; at this point planned for Monday     Fluids/ Electrolytes/ Nutrition:   - Hypernatremia improved to 162. Will continue to hold home desmopressin until normalized and treat with vasopressin  - D5W at 200cc  - Check sodium levels q4h. Goal to decrease sodium by 10mmol per day. Will titrate D5W to achieve that goal.  - Vasopressin started at 2.4/hr  - ICU electrolyte replacement protocol  - No indication for parenteral nutrition.  - Nutrition consulted. Appreciate recs    Renal/ Fluid Balance:     - Will monitor intake and output.  - Condom catheter     Endocrine:    - Switch steroids to home regimen: hydrocortisone 20mg in AM and 10 mg in PM  - SSI     ID/ Antibiotics:  - Ertapenem and  linezolid for pancreatic necrosis   - Necrotic pancreatitis with fluid culture growing enterobacter and VRE     Heme:     - Hgb 6.6. Will transfuse one unit an recheck this PM     Prophylaxis:    - Mechanical prophylaxis for DVT.  - Hep TID  - PPI BID     MSK:     - PT and OT consulted. Appreciate recs.     Lines/ tubes/ drains:  - Trach, PIV x2, G-J tube, L PICC     Disposition:  - Surgical ICU.    Patient seen, findings and plan discussed with Dr. Obrien.    Yoel Montgomery MD  General Surgery PGY-2    ====================================    TODAY'S PROGRESS:   SUBJECTIVE:   Patient appears unchanged neurostatus with improving hypernatremia. No pain. Tolerating tube feeds. Voiding to condom cath. Having BM    OBJECTIVE:   1. VITAL SIGNS:   Temp:  [97.1  F (36.2  C)-98.9  F (37.2  C)] 97.6  F (36.4  C)  Heart Rate:  [] 47  Resp:  [14-21] 16  BP: (109-141)/(61-84) 121/67 mmHg  FiO2 (%):  [30 %] 30 %  SpO2:  [97 %-100 %] 100 %  Ventilation Mode: CMV/AC  FiO2 (%): 30 %  Rate Set (breaths/minute): 14 breaths/min  Tidal Volume Set (mL): 550 mL  PEEP (cm H2O): 5 cmH2O  Pressure Support (cm H2O): 10 cmH2O  Oxygen Concentration (%): 30 %  Resp: 16    2. INTAKE/ OUTPUT:   I/O last 3 completed shifts:  In: 4555.96 [I.V.:3065.96; NG/GT:770]  Out: 6100 [Urine:6050; Emesis/NG output:50]    3. PHYSICAL EXAMINATION:   General: Awake and alert  HEENT: Trach in place  Neuro: Follows commands.  CV: RRR  Pulm: Ventilated on CMV  Abd: Soft; minimally-tender; non-distended; G-J tube in place; no peritoneal signs  Extremities: WWP    4. INVESTIGATIONS:   Arterial Blood Gases     Recent Labs  Lab 01/22/17  0500 01/21/17  2348   PH 7.56* 7.47*   PCO2 22* 26*   PO2 133* 117*   HCO3 20* 19*     Complete Blood Count     Recent Labs  Lab 01/26/17  0403 01/25/17  0426 01/24/17  0400 01/23/17  1720   WBC 8.1 7.2 6.7 8.0   HGB 6.6* 7.8* 7.4* 7.2*   * 162 117* 115*     Basic Metabolic Panel    Recent Labs  Lab 01/26/17  0403  01/25/17  2347 01/25/17  1959 01/25/17  1539  01/25/17  0521 01/25/17  0426  01/24/17  0400   * 154* 164* 168*  < > 171* 172*  --  142   POTASSIUM 3.0*  --  3.0*  --   --  3.3* 3.4  < > 3.5   CHLORIDE 127*  --   --   --   --  135* 136*  --  108   CO2 26  --   --   --   --  29 28  --  24   BUN 18  --   --   --   --  16 15  --  20   CR 0.75  --   --   --   --  0.80 0.82  --  0.75   *  --   --   --   --  138* 127*  --  104*   < > = values in this interval not displayed.  Liver Function Tests    Recent Labs  Lab 01/25/17  0426 01/23/17  1720 01/22/17  1003 01/22/17  0500 01/21/17  1600   AST  --   --   --   --  21   ALT  --   --   --   --  23   ALKPHOS  --   --   --   --  63   BILITOTAL  --   --   --   --  1.0   ALBUMIN  --   --   --   --  2.1*   INR 1.49* 1.53* 2.58* 2.48*  --      Pancreatic Enzymes    Recent Labs  Lab 01/22/17  0500 01/21/17  1600   LIPASE 291 789*     Coagulation Profile    Recent Labs  Lab 01/25/17  0426 01/23/17  1720 01/22/17  1003 01/22/17  0500   INR 1.49* 1.53* 2.58* 2.48*   PTT  --   --  52*  --          5. RADIOLOGY:   Recent Results (from the past 24 hour(s))   CT Abdomen Pelvis w/o Contrast    Narrative    CT ABDOMEN PELVIS W/O CONTRAST 1/25/2017 9:34 AM    History: necrotizing pancreatitis, planning for EUS/necrosectomy    Comparison: 1/22/2017, 1/21/2017    Technique: Helical CT images were obtained from the dome of the liver  to the since the without contrast.  Images were reconstructed in  multiple planes using multiple reconstruction algorithms.     Findings:  Lung bases: Micronodularity in the left lung base, with atelectasis  and/or consolidation in both lung bases. Small left pleural effusion.  Partially visualized central venous catheter with tip at the superior  atriocaval junction.    Abdomen and pelvis: GJ tube with tip in the proximal jejunum. The  majority of the pancreatic parenchyma is replaced by a heterogeneous  fat containing collection measuring  approximately 11.5 x 4.8 x 5.7 cm  with multiple apparent loculations. In the pancreatic head, there  appears to be a second separate heterogeneous collection measuring 2.9  cm in diameter. At the root of the mesentery inferior to the duodenal  sweep (series 5 image 259), there is a 4.6 x 2.0 cm more simple  appearing fluid collection with intermediate density. Along the  inferior margin of the greater curvature of the stomach adjacent to  the G-tube, there is a 7.3 x 3.6 x 5.1 cm collection, which is larger  than on the prior exam, and shows intermediate density. No dilated  loops of small or large bowel. Mild, likely reactive lymphadenopathy  in the abdomen. Postoperative evidence of partial hepatectomy. Fluid  in the rectovesical pouch. Noncontrast evaluation kidneys (except for  a nonobstructing 2 mm stone which is stable since 7/30/2013), spleen,  urinary bladder, prostate, small bowel, large bowel are unremarkable.    Subcutaneous soft tissues: Tiny fat-containing umbilical hernia. Mild  anasarca. Partially visualized left-sided PICC.    Bones: Mild multilevel degenerative change of the visualized spine  without aggressive appearing bony lesion or acute fracture. Several  chronic appearing compression deformities of the superior endplates of  the upper lumbar spine      Impression    Impression:   1. The pancreatic parenchyma is nearly completely replaced by a  heterogeneous, multilobulated necrotic collection which is not  significantly changed in size when compared to 1/21/2017.  2. Separate collection along the inferior/lateral margin of the  stomach at the level of the G-tube is slightly larger than on  1/21/2017, now measuring approximately 7.3 x 3.6 x 5.1 cm.  3. Other collections in the pancreatic head and root of the mesentery  are not significantly changed.  4. Mild ascites along the spleen and in the pelvis.  5. Reticulonodular appearance of the right lung base with associated  consolidation,  concerning for infection or aspiration.  6. Small left pleural effusion with associated left basilar  atelectasis and/or consolidation.    KADE MENJIVAR   XR Chest Port 1 View    Narrative    Exam:  XR CHEST PORT 1 VW, 1/25/2017 3:36 PM    History: RN placed PICC - verify tip placement    Comparison:  Chest radiograph from 1/21/2017.    Findings:  Single AP view of the chest is obtained. Stable  tracheostomy with tip in the midthoracic trachea. Left PICC tip  terminates at the superior intracaval junction. The cardiomediastinal  silhouette is within normal limits. Low lung volumes. No significant  change in perihilar and diffuse interstitial opacities likely  representing pulmonary edema. Small left pleural effusion. No  pneumothorax. Visualized upper abdomen is unremarkable.      Impression    Impression:    1. Left PICC tip terminates at the superior atriocaval junction.  2. Left greater than right bibasilar opacities may represent  atelectasis and/or consolidation.  3. Bilateral perihilar and diffuse interstitial opacities are  unchanged and likely represent pulmonary edema versus infection.  4. Small left pleural effusion.    I have personally reviewed the examination and initial interpretation  and I agree with the findings.    JACKIE GARCIA MD       =========================================

## 2017-01-26 NOTE — PROGRESS NOTES
GASTROENTEROLOGY PROGRESS NOTE    Date of Admission: 1/23/2017  Reason for Admission: necrotizing pancreatitis    Assessment:  56 year old male with a history of TBI with resultant seizures, aphasia, right sided hemiplegia, v-fib cardiac arrest, panhypopituitarism, and chronic tracheostomy who is transferred from [facility] where he was admitted 1/21 from his LTACH with increasing abd discomfort, fevers and resp distress and was found to have necrotizing pancreatitis based on CT findings of multiple locations of walled off necrosis. He did undergo CT guided aspiration of the largest fluid collection in the pancreatic tail in which cultures grew enterobacter cloacae complex + VRE.     Clinically, the patient has improved, has remained afebrile since 1/22 and notes LUQ/Epigastric pain but appetite is intact. His PEG-J tube has been functioning since admission.    Patient profoundly hypernatremic on 1/25/17, likely iatrogenic diabetes insipidus. Will need this corrected prior to necrosectomy.  Given stability, tentative plan for necrosectomy early next week.      Recommendations:  Plan for EUS with cystgastrostomy placement, possible PEG-J exchange (if it becomes dislodged) - timing dependent on electrolyte correction, likely early next week given stability.  Hold TF 6 hours prior to procedure  Cont abx for now  Continue PERT in the presence of PEG-J TF  Patient's mother in agreement with plan        Gastroenterology follow up recommendations: TBD    Pt care plan discussed with Dr. Marcus, GI staff physician.    Washington Pandey MD  GI fellow  [facility]  Pager *[phone]  _______________________________________________________________      Subjective: No acute events overnight.  Patient reports some LUQ and epigastric discomfort.  Appetite intact, no nausea, no vomiting, Stool output normal.   Mother not at bedside. mental status  "seems to be at baseline. No headache    ROS:   10 pt ROS negative unless noted in subjective.     Objective:  Blood pressure 122/63, temperature 97.9  F (36.6  C), temperature source Axillary, resp. rate 12, height 1.651 m (5' 5\"), weight 71.5 kg (157 lb 10.1 oz), SpO2 100 %.  Gen: NAD  HEENT: No icertus  Abd: Soft, NT, ND. TTP on epigastric and left upper quadrant.  No rebound  Ext: WWP  Skin: No jaundice        LABS:  BMP  Recent Labs  Lab 01/26/17  0936 01/26/17  0811 01/26/17  0403 01/25/17  2347 01/25/17  1959  01/25/17  0521 01/25/17  0426  01/24/17  0400   * Canceled, Test credited Unsatisfactory specimen - contaminated 162* 154* 164*  < > 171* 172*  --  142   POTASSIUM  --   --  3.0*  --  3.0*  --  3.3* 3.4  < > 3.5   CHLORIDE  --   --  127*  --   --   --  135* 136*  --  108   STEVE  --   --  7.2*  --   --   --  8.6 8.2*  --  7.5*   CO2  --   --  26  --   --   --  29 28  --  24   BUN  --   --  18  --   --   --  16 15  --  20   CR  --   --  0.75  --   --   --  0.80 0.82  --  0.75   GLC  --   --  108*  --   --   --  138* 127*  --  104*   < > = values in this interval not displayed.  CBC    Recent Labs  Lab 01/26/17  0403 01/25/17  0426 01/24/17  0400 01/23/17  1720   WBC 8.1 7.2 6.7 8.0   RBC 2.83* 3.29* 3.15* 2.98*   HGB 6.6* 7.8* 7.4* 7.2*   HCT 23.2* 26.5* 24.9* 23.4*   MCV 82 81 79 79   MCH 23.3* 23.7* 23.5* 24.2*   MCHC 28.4* 29.4* 29.7* 30.8*   RDW 20.2* 19.4* 19.4* 19.5*   * 162 117* 115*     INR    Recent Labs  Lab 01/25/17  0426 01/23/17  1720 01/22/17  1003 01/22/17  0500   INR 1.49* 1.53* 2.58* 2.48*     LFTs    Recent Labs  Lab 01/21/17  1600   ALKPHOS 63   AST 21   ALT 23   BILITOTAL 1.0   PROTTOTAL 6.1*   ALBUMIN 2.1*      PANC    Recent Labs  Lab 01/22/17  0500 01/21/17  1600   LIPASE 291 789*         IMAGING:  CT ABDOMEN PELVIS W/O CONTRAST 1/25/2017 9:34 AM     History: necrotizing pancreatitis, planning for EUS/necrosectomy     Comparison: 1/22/2017, 1/21/2017     Technique: " Helical CT images were obtained from the dome of the liver  to the since the without contrast.  Images were reconstructed in  multiple planes using multiple reconstruction algorithms.       Findings:  Lung bases: Micronodularity in the left lung base, with atelectasis  and/or consolidation in both lung bases. Small left pleural effusion.  Partially visualized central venous catheter with tip at the superior  atriocaval junction.     Abdomen and pelvis: GJ tube with tip in the proximal jejunum. The  majority of the pancreatic parenchyma is replaced by a heterogeneous  fat containing collection measuring approximately 11.5 x 4.8 x 5.7 cm  with multiple apparent loculations. In the pancreatic head, there  appears to be a second separate heterogeneous collection measuring 2.9  cm in diameter. At the root of the mesentery inferior to the duodenal  sweep (series 5 image 259), there is a 4.6 x 2.0 cm more simple  appearing fluid collection with intermediate density. Along the  inferior margin of the greater curvature of the stomach adjacent to  the G-tube, there is a 7.3 x 3.6 x 5.1 cm collection, which is larger  than on the prior exam, and shows intermediate density. No dilated  loops of small or large bowel. Mild, likely reactive lymphadenopathy  in the abdomen. Postoperative evidence of partial hepatectomy. Fluid  in the rectovesical pouch. Noncontrast evaluation kidneys (except for  a nonobstructing 2 mm stone which is stable since 7/30/2013), spleen,  urinary bladder, prostate, small bowel, large bowel are unremarkable.     Subcutaneous soft tissues: Tiny fat-containing umbilical hernia. Mild  anasarca. Partially visualized left-sided PICC.     Bones: Mild multilevel degenerative change of the visualized spine  without aggressive appearing bony lesion or acute fracture. Several  chronic appearing compression deformities of the superior endplates of  the upper lumbar  spine                                                                       Impression:    1. The pancreatic parenchyma is nearly completely replaced by a  heterogeneous, multilobulated necrotic collection which is not  significantly changed in size when compared to 1/21/2017.  2. Separate collection along the inferior/lateral margin of the  stomach at the level of the G-tube is slightly larger than on  1/21/2017, now measuring approximately 7.3 x 3.6 x 5.1 cm.  3. Other collections in the pancreatic head and root of the mesentery  are not significantly changed.  4. Mild ascites along the spleen and in the pelvis.  5. Reticulonodular appearance of the right lung base with associated  consolidation, concerning for infection or aspiration.  6. Small left pleural effusion with associated left basilar  atelectasis and/or consolidation.

## 2017-01-26 NOTE — PLAN OF CARE
Problem: Goal Outcome Summary  Goal: Goal Outcome Summary    D: Adm 1/23 from OSH w/necrotizing pancreatitis.  A/I:  Neuro: Restless & uncooperative overnight. Orientation unclear. Pt picks and chooses when to answer questions and cooperate with cares. Seroquel given x1 at bedtime. Aggressively attempts to harm staff with LUE when free during turns, therefore remains in restraint. Restraint added to RUE--this extremity with hemiplegia but is somehow able to get hand over to condom cath to try to remove.   CV: SB to sinus arrhythmia in 40s-60s. PACs more last evening. Lowest HR seen=37. Occasionally and briefly trended to 38-39 over the last few hours. K replaced with 60mEq last night, requiring same replacement this morning. Phos also replaced x2.   Resp: Immediately failed PS trial again this morning d/t apnea.  GI: TF remain at 30mL/hr, unable to advance d/t lytes. Rectal tube placed for frequent watery stools.  : Two urine occurrences of 340mL & 350mL of cloudy/hazy darell urine via condom cath.   Labs: Sodium 164, 154, this morning 162. D5W now at 50mL/hr. Phos replacement currently piggybacked on this at 62.5mL/hr.   P: Re-attempt pressure support. Closely monitor labs.

## 2017-01-26 NOTE — PLAN OF CARE
Problem: Goal Outcome Summary  Goal: Goal Outcome Summary  Outcome: Improving  Pt started on vasopressin today for high serum sodium levels and high urine output, urine output decreased throughout shift and went from clear to yellow in color.  Pt failed pressure support and continues on CMV. Electrolytes replaced.

## 2017-01-27 LAB
ANION GAP SERPL CALCULATED.3IONS-SCNC: 10 MMOL/L (ref 3–14)
BACTERIA SPEC CULT: NO GROWTH
BACTERIA SPEC CULT: NO GROWTH
BUN SERPL-MCNC: 22 MG/DL (ref 7–30)
CALCIUM SERPL-MCNC: 6.9 MG/DL (ref 8.5–10.1)
CHLORIDE SERPL-SCNC: 119 MMOL/L (ref 94–109)
CO2 SERPL-SCNC: 23 MMOL/L (ref 20–32)
CREAT SERPL-MCNC: 0.78 MG/DL (ref 0.66–1.25)
ERYTHROCYTE [DISTWIDTH] IN BLOOD BY AUTOMATED COUNT: 19.3 % (ref 10–15)
GFR SERPL CREATININE-BSD FRML MDRD: ABNORMAL ML/MIN/1.7M2
GLUCOSE SERPL-MCNC: 136 MG/DL (ref 70–99)
HCT VFR BLD AUTO: 25.6 % (ref 40–53)
HGB BLD-MCNC: 7.6 G/DL (ref 13.3–17.7)
Lab: NORMAL
Lab: NORMAL
MAGNESIUM SERPL-MCNC: 2.5 MG/DL (ref 1.6–2.3)
MCH RBC QN AUTO: 24.3 PG (ref 26.5–33)
MCHC RBC AUTO-ENTMCNC: 29.7 G/DL (ref 31.5–36.5)
MCV RBC AUTO: 82 FL (ref 78–100)
MICRO REPORT STATUS: NORMAL
MICRO REPORT STATUS: NORMAL
PHOSPHATE SERPL-MCNC: 2.8 MG/DL (ref 2.5–4.5)
PLATELET # BLD AUTO: 140 10E9/L (ref 150–450)
POTASSIUM SERPL-SCNC: 3.4 MMOL/L (ref 3.4–5.3)
RBC # BLD AUTO: 3.13 10E12/L (ref 4.4–5.9)
SODIUM SERPL-SCNC: 134 MMOL/L (ref 133–144)
SODIUM SERPL-SCNC: 145 MMOL/L (ref 133–144)
SODIUM SERPL-SCNC: 145 MMOL/L (ref 133–144)
SODIUM SERPL-SCNC: 150 MMOL/L (ref 133–144)
SODIUM SERPL-SCNC: 152 MMOL/L (ref 133–144)
SODIUM SERPL-SCNC: 152 MMOL/L (ref 133–144)
SPECIMEN SOURCE: NORMAL
SPECIMEN SOURCE: NORMAL
WBC # BLD AUTO: 9.9 10E9/L (ref 4–11)

## 2017-01-27 PROCEDURE — 83735 ASSAY OF MAGNESIUM: CPT | Performed by: SURGERY

## 2017-01-27 PROCEDURE — 99291 CRITICAL CARE FIRST HOUR: CPT | Mod: GC | Performed by: SURGERY

## 2017-01-27 PROCEDURE — 27210436 ZZH NUTRITION PRODUCT SEMIELEM INTERMED CAN

## 2017-01-27 PROCEDURE — A9270 NON-COVERED ITEM OR SERVICE: HCPCS | Mod: GY | Performed by: SURGERY

## 2017-01-27 PROCEDURE — 40000275 ZZH STATISTIC RCP TIME EA 10 MIN

## 2017-01-27 PROCEDURE — 20000004 ZZH R&B ICU UMMC

## 2017-01-27 PROCEDURE — 25000132 ZZH RX MED GY IP 250 OP 250 PS 637: Mod: GY

## 2017-01-27 PROCEDURE — 80048 BASIC METABOLIC PNL TOTAL CA: CPT | Performed by: SURGERY

## 2017-01-27 PROCEDURE — 84295 ASSAY OF SERUM SODIUM: CPT | Performed by: SURGERY

## 2017-01-27 PROCEDURE — 84100 ASSAY OF PHOSPHORUS: CPT | Performed by: SURGERY

## 2017-01-27 PROCEDURE — A9270 NON-COVERED ITEM OR SERVICE: HCPCS | Mod: GY | Performed by: ANESTHESIOLOGY

## 2017-01-27 PROCEDURE — 94003 VENT MGMT INPAT SUBQ DAY: CPT

## 2017-01-27 PROCEDURE — 25000132 ZZH RX MED GY IP 250 OP 250 PS 637: Mod: GY | Performed by: ANESTHESIOLOGY

## 2017-01-27 PROCEDURE — 25000132 ZZH RX MED GY IP 250 OP 250 PS 637: Mod: GY | Performed by: SURGERY

## 2017-01-27 PROCEDURE — 25000125 ZZHC RX 250: Performed by: SURGERY

## 2017-01-27 PROCEDURE — A9270 NON-COVERED ITEM OR SERVICE: HCPCS | Mod: GY

## 2017-01-27 PROCEDURE — 85027 COMPLETE CBC AUTOMATED: CPT | Performed by: SURGERY

## 2017-01-27 PROCEDURE — 25000125 ZZHC RX 250

## 2017-01-27 RX ORDER — ACETAMINOPHEN 325 MG/1
650 TABLET ORAL EVERY 6 HOURS PRN
Status: DISCONTINUED | OUTPATIENT
Start: 2017-01-27 | End: 2017-01-27

## 2017-01-27 RX ORDER — CARBAMAZEPINE 100 MG/5ML
200 SUSPENSION ORAL 4 TIMES DAILY
Status: DISCONTINUED | OUTPATIENT
Start: 2017-01-27 | End: 2017-01-30

## 2017-01-27 RX ORDER — DESMOPRESSIN ACETATE 0.1 MG/1
100 TABLET ORAL 2 TIMES DAILY
Status: DISCONTINUED | OUTPATIENT
Start: 2017-01-27 | End: 2017-01-28

## 2017-01-27 RX ADMIN — DESMOPRESSIN ACETATE 100 MCG: 0.1 TABLET ORAL at 20:07

## 2017-01-27 RX ADMIN — POTASSIUM CHLORIDE 20 MEQ: 1.5 POWDER, FOR SOLUTION ORAL at 05:49

## 2017-01-27 RX ADMIN — DEXTROSE MONOHYDRATE: 50 INJECTION, SOLUTION INTRAVENOUS at 09:45

## 2017-01-27 RX ADMIN — ENOXAPARIN SODIUM 40 MG: 40 INJECTION SUBCUTANEOUS at 10:14

## 2017-01-27 RX ADMIN — OXYCODONE HYDROCHLORIDE 5 MG: 5 SOLUTION ORAL at 10:15

## 2017-01-27 RX ADMIN — OXYCODONE HYDROCHLORIDE 5 MG: 5 SOLUTION ORAL at 01:33

## 2017-01-27 RX ADMIN — CARBAMAZEPINE 200 MG: 100 SUSPENSION ORAL at 12:56

## 2017-01-27 RX ADMIN — OXYCODONE HYDROCHLORIDE 5 MG: 5 SOLUTION ORAL at 20:03

## 2017-01-27 RX ADMIN — POTASSIUM PHOSPHATE, MONOBASIC AND POTASSIUM PHOSPHATE, DIBASIC 9 MMOL: 224; 236 INJECTION, SOLUTION INTRAVENOUS at 16:53

## 2017-01-27 RX ADMIN — Medication 6 MG: at 21:56

## 2017-01-27 RX ADMIN — TESTOSTERONE CYPIONATE 200 MG: 200 INJECTION, SOLUTION INTRAMUSCULAR at 18:37

## 2017-01-27 RX ADMIN — ERTAPENEM SODIUM 1 G: 1 INJECTION, POWDER, LYOPHILIZED, FOR SOLUTION INTRAMUSCULAR; INTRAVENOUS at 16:53

## 2017-01-27 RX ADMIN — SODIUM BICARBONATE 325 MG: 325 TABLET ORAL at 18:17

## 2017-01-27 RX ADMIN — BRIVARACETAM 50 MG: 50 INJECTION, SUSPENSION INTRAVENOUS at 20:17

## 2017-01-27 RX ADMIN — CARBAMAZEPINE 200 MG: 100 SUSPENSION ORAL at 16:53

## 2017-01-27 RX ADMIN — CALCIUM CARBONATE 1250 MG: 1250 SUSPENSION ORAL at 09:21

## 2017-01-27 RX ADMIN — SODIUM BICARBONATE 325 MG: 325 TABLET ORAL at 05:49

## 2017-01-27 RX ADMIN — Medication 15 ML: at 09:07

## 2017-01-27 RX ADMIN — CALCIUM CARBONATE 1250 MG: 1250 SUSPENSION ORAL at 18:38

## 2017-01-27 RX ADMIN — BRIVARACETAM 50 MG: 50 INJECTION, SUSPENSION INTRAVENOUS at 09:22

## 2017-01-27 RX ADMIN — CALCIUM CARBONATE 1250 MG: 1250 SUSPENSION ORAL at 12:56

## 2017-01-27 RX ADMIN — SODIUM BICARBONATE 325 MG: 325 TABLET ORAL at 01:49

## 2017-01-27 RX ADMIN — ACETAMINOPHEN 650 MG: 325 TABLET, FILM COATED ORAL at 01:33

## 2017-01-27 RX ADMIN — HEPARIN SODIUM 5000 UNITS: 5000 INJECTION, SOLUTION INTRAVENOUS; SUBCUTANEOUS at 01:33

## 2017-01-27 RX ADMIN — PANCRELIPASE 48000 UNITS: 24000; 76000; 120000 CAPSULE, DELAYED RELEASE PELLETS ORAL at 09:45

## 2017-01-27 RX ADMIN — PANCRELIPASE 48000 UNITS: 24000; 76000; 120000 CAPSULE, DELAYED RELEASE PELLETS ORAL at 01:49

## 2017-01-27 RX ADMIN — SODIUM BICARBONATE 325 MG: 325 TABLET ORAL at 09:46

## 2017-01-27 RX ADMIN — ACETAMINOPHEN 650 MG: 325 TABLET, FILM COATED ORAL at 09:07

## 2017-01-27 RX ADMIN — LEVOTHYROXINE SODIUM 137 MCG: 137 TABLET ORAL at 09:21

## 2017-01-27 RX ADMIN — Medication 81 MG: at 09:22

## 2017-01-27 RX ADMIN — CARBAMAZEPINE 200 MG: 100 TABLET, CHEWABLE ORAL at 09:22

## 2017-01-27 RX ADMIN — LINEZOLID 600 MG: 100 SUSPENSION ORAL at 09:21

## 2017-01-27 RX ADMIN — Medication 20 MG: at 09:21

## 2017-01-27 RX ADMIN — PANCRELIPASE 48000 UNITS: 24000; 76000; 120000 CAPSULE, DELAYED RELEASE PELLETS ORAL at 15:00

## 2017-01-27 RX ADMIN — POTASSIUM PHOSPHATE, MONOBASIC AND POTASSIUM PHOSPHATE, DIBASIC 9 MMOL: 224; 236 INJECTION, SOLUTION INTRAVENOUS at 20:08

## 2017-01-27 RX ADMIN — Medication 10 MG: at 20:07

## 2017-01-27 RX ADMIN — PANCRELIPASE 48000 UNITS: 24000; 76000; 120000 CAPSULE, DELAYED RELEASE PELLETS ORAL at 21:54

## 2017-01-27 RX ADMIN — LINEZOLID 600 MG: 100 SUSPENSION ORAL at 20:08

## 2017-01-27 RX ADMIN — PANCRELIPASE 48000 UNITS: 24000; 76000; 120000 CAPSULE, DELAYED RELEASE PELLETS ORAL at 05:49

## 2017-01-27 RX ADMIN — POTASSIUM PHOSPHATE, MONOBASIC AND POTASSIUM PHOSPHATE, DIBASIC 9 MMOL: 224; 236 INJECTION, SOLUTION INTRAVENOUS at 09:21

## 2017-01-27 RX ADMIN — SODIUM BICARBONATE 325 MG: 325 TABLET ORAL at 21:54

## 2017-01-27 RX ADMIN — SODIUM BICARBONATE 325 MG: 325 TABLET ORAL at 15:00

## 2017-01-27 RX ADMIN — Medication 40 MG: at 20:07

## 2017-01-27 RX ADMIN — Medication 40 MG: at 09:21

## 2017-01-27 RX ADMIN — PANCRELIPASE 48000 UNITS: 24000; 76000; 120000 CAPSULE, DELAYED RELEASE PELLETS ORAL at 18:17

## 2017-01-27 RX ADMIN — CARBAMAZEPINE 200 MG: 100 SUSPENSION ORAL at 20:07

## 2017-01-27 ASSESSMENT — ACTIVITIES OF DAILY LIVING (ADL): FALL_HISTORY_WITHIN_LAST_SIX_MONTHS: NO

## 2017-01-27 ASSESSMENT — PAIN DESCRIPTION - DESCRIPTORS: DESCRIPTORS: TENDER

## 2017-01-27 NOTE — PLAN OF CARE
Problem: Goal Outcome Summary  Goal: Goal Outcome Summary  PT 4A: Attempted to see pt to initiate PT this afternoon, per RN pt has just returned to bed after sitting up in chair for majority of day. PT will re-attempt tomorrow.

## 2017-01-27 NOTE — PLAN OF CARE
Problem: Pancreatitis, Acute/Chronic (Adult)  Goal: Signs and Symptoms of Listed Potential Problems Will be Absent or Manageable (Pancreatitis, Acute/Chronic)  Signs and symptoms of listed potential problems will be absent or manageable by discharge/transition of care (reference Pancreatitis, Acute/Chronic (Adult) CPG).   Outcome: No Change    01/25/17 2000   Pancreatitis, Acute/Chronic   Problems Assessed (Pancreatitis) all   Problems Present (Pancreatitis) diarrhea;fluid imbalance;infection         Problem: Goal Outcome Summary  Goal: Goal Outcome Summary  Outcome: No Change  D:  Upon taking over care of pt at 08:00, pt with hgb=6.9.  Pt with orders to transfuse with a unit of PRBC's.  I:  RN administered PrBC's per nursing/hospital orders.  A; Pt's hgb recheck came back at 7.9 no further blood transfusion necessary.  P:  Continue with current plan of care.    Problem: Restraint for Non-Violent/Non-Self-Destructive Behavior  Goal: Prevent/Manage Potential Problems  Maintain safety of patient and others during period of restraint.  Promote psychological and physical wellbeing.  Prevent injury to skin and involved body parts.  Promote nutrition, hydration, and elimination.   Outcome: No Change  Pt continues to grab onto his tubes, he pulled off his condom catheter x1 and attempted to pull out her dignishield rectal tube during the shift despite having his hand with soft wrist restraint.  Pt requires soft wrist restraint to left hand.

## 2017-01-27 NOTE — PROGRESS NOTES
1/27/2017 Gastroenterology Brief Note      Assessment:  56 year old male with a history of TBI with resultant seizures, aphasia, right sided hemiplegia, v-fib cardiac arrest, panhypopituitarism, and chronic tracheostomy who is transferred from [facility] where he was admitted 1/21 from his LTACH with increasing abd discomfort, fevers and resp distress and was found to have necrotizing pancreatitis based on CT findings of multiple locations of walled off necrosis. He did undergo CT guided aspiration of the largest fluid collection in the pancreatic tail in which cultures grew enterobacter cloacae complex + VRE.     Clinically, the patient has improved, has remained afebrile since 1/22 and notes LUQ/Epigastric pain but appetite is intact. His PEG-J tube has been functioning since admission.    Patient profoundly hypernatremic on 1/25/17, d/t home desmopressin not being administered on transfer. Will need this corrected prior to procedure.  Given stability, tentative plan for EUS with cystgastrostomy early next week.      Recommendations:  Plan for EUS with cystgastrostomy placement, possible PEG-J exchange scheduled for Monday 1/30/16  Please contact GI fellow over the weekend if patient shows s/s deterioration  Hold TF 6 hours prior to procedure  Cont abx for now  Continue PERT in the presence of PEG-J TF  Patient's mother in agreement with plan      Gastroenterology follow up recommendations: TBD    Pt care plan discussed with Dr. Marcus, GI staff physician.    Meliza Lofton PA-C  Interventional Gastroenterology/Pancreaticobiliary Service  Pager *[phone]    _____

## 2017-01-27 NOTE — PLAN OF CARE
Problem: Goal Outcome Summary  Goal: Goal Outcome Summary  D: Adm 1/23 from OSH w/necrotizing pancreatitis.  A/I:  Restless & uncooperative. Is very determined to remove condom cath and rectal tube. Requiring bilateral wrist restraints despite right sided hemiplegia.    C/o abdominal tenderness. Grimaces w/palpation. Oxycodone given x1 + scheduled tylenol. Seroquel given at bedtime. Slept most of the night.  HR 60-90s, mostly seen in a Sinus Arrhythmia, at times more regular in a SR w/rare-occasional PACs.  Tmax 99.9ax.  TF advanced to 50mL/hr (goal 60); met K/mag/phos goals as outlined in TF order. All still replaced per protocol.   Rectal tube w/400mL/hr watery stool.  Overnight voided total 450mL into condom cath + a large unmeasurable volume that leaked around cath.  No non-blanchable areas found on coccyx, all blanchable.  Sodiums trended down to 152. D5W currently at 200mL/hr.   P: Pressure support. Q4h sodiums.

## 2017-01-27 NOTE — PROGRESS NOTES
SURGICAL ICU PROGRESS NOTE  January 27, 2017      CO-MORBIDITIES:   TBI  Seizure disorder  Hemiplegia  Panhypopituitarism  Chronic tracheostomy    ASSESSMENT: 56yM with TBI resulting in seizure disorder,spastic hemiplegia, and aphasia, v-fib cardiac arrest, panhypopituitarism and chronic trach who presents as a transfer for necrotizing pancreatitis. Fluid collection at the tail of the pancreas was aspirated and grew enterobacter. Patient's home desmopressin was inadvertently not restarted and developed hypernatremia. Will continue to correct this with free water. Stop vasopressin and start of desmopressin. Patient otherwise doing well.    PLAN:   Neuro/ pain/ sedation:  - Monitor neurological status. Notify the MD for any acute changes in exam.  - Anti-seizure: Carbamazepine and brivaracetam  - Panhypopituitarism: synthroid, testosterone, desmopression  - Seroquel for sedation    Pulmonary care:   - Wean FiO2 to keep saturation above 90%.  - Will work toward wean to trach dome     Cardiovascular:    - Monitor hemodynamic status.      GI care:   - NPO.  - Tube feeds at goal  - Meds per J tube  - Appreciate GI recs. Plan for cystgastrostomy once Na corrects; at this point planned for Monday     Fluids/ Electrolytes/ Nutrition:   - Hypernatremia improved to 152. Will start home desmopressin and stop vasopressin  - D5W at 200cc/hr  - Check sodium levels q4h. Goal Na <145. Will titrate D5W to achieve that goal.  - ICU electrolyte replacement protocol  - No indication for parenteral nutrition.  - Nutrition consulted. Appreciate recs    Renal/ Fluid Balance:     - Will monitor intake and output.  - Condom catheter     Endocrine:    - Hydrocortisone 20mg in AM and 10 mg in PM  - SSI     ID/ Antibiotics:  - Ertapenem and linezolid for pancreatic necrosis   - Necrotic pancreatitis with fluid culture growing enterobacter and VRE     Heme:     - Hgb 7.6.      Prophylaxis:    - Mechanical prophylaxis for DVT.  - Lovenox  daily  - PPI BID     MSK:     - PT and OT consulted. Appreciate recs.     Lines/ tubes/ drains:  - Trach, PIV x2, G-J tube, L PICC     Disposition:  - Surgical ICU.    Patient seen, findings and plan discussed with Dr. Obrien.    Yoel Montgomery MD  General Surgery PGY-2    ====================================    TODAY'S PROGRESS:   SUBJECTIVE:   Patient appears unchanged neurostatus with improving hypernatremia. Denies pain, but has minimal tenderness on exam. Tolerating tube feeds. Voiding to condom cath. Having BM with rectal tube in place    OBJECTIVE:   1. VITAL SIGNS:   Temp:  [97.2  F (36.2  C)-99.8  F (37.7  C)] 99.8  F (37.7  C)  Heart Rate:  [47-89] 77  Resp:  [12-18] 14  BP: ()/(54-83) 128/74 mmHg  FiO2 (%):  [30 %] 30 %  SpO2:  [95 %-100 %] 100 %  Ventilation Mode: CPAP/PS  FiO2 (%): 30 %  Rate Set (breaths/minute): 14 breaths/min  Tidal Volume Set (mL): 500 mL  PEEP (cm H2O): 5 cmH2O  Pressure Support (cm H2O): 7 cmH2O  Oxygen Concentration (%): 30 %  Resp: 14    2. INTAKE/ OUTPUT:   I/O last 3 completed shifts:  In: 3440.1 [I.V.:1915.1; NG/GT:725]  Out: 1675 [Urine:975; Stool:700]    3. PHYSICAL EXAMINATION:   General: Awake and alert  HEENT: Trach in place  Neuro: Follows commands.  CV: RRR  Pulm: Ventilated on CPAP during exam  Abd: Soft; minimally-tender; non-distended; G-J tube in place; no peritoneal signs  Extremities: WWP    4. INVESTIGATIONS:   Arterial Blood Gases     Recent Labs  Lab 01/22/17  0500 01/21/17  2348   PH 7.56* 7.47*   PCO2 22* 26*   PO2 133* 117*   HCO3 20* 19*     Complete Blood Count     Recent Labs  Lab 01/27/17  0400 01/26/17  1757 01/26/17  0403 01/25/17  0426 01/24/17  0400   WBC 9.9  --  8.1 7.2 6.7   HGB 7.6* 7.6* 6.6* 7.8* 7.4*   *  --  141* 162 117*     Basic Metabolic Panel    Recent Labs  Lab 01/27/17  0400 01/27/17 01/26/17 2020 01/26/17  1757  01/26/17  0403  01/25/17  1959  01/25/17  0521 01/25/17  0426   * 152* 156* 153*  < > 162*  < > 164*  <  > 171* 172*   POTASSIUM 3.4  --  3.4  --   --  3.0*  --  3.0*  --  3.3* 3.4   CHLORIDE 119*  --   --   --   --  127*  --   --   --  135* 136*   CO2 23  --   --   --   --  26  --   --   --  29 28   BUN 22  --   --   --   --  18  --   --   --  16 15   CR 0.78  --   --   --   --  0.75  --   --   --  0.80 0.82   *  --   --   --   --  108*  --   --   --  138* 127*   < > = values in this interval not displayed.  Liver Function Tests    Recent Labs  Lab 01/25/17 0426 01/23/17 1720 01/22/17 1003 01/22/17  0500 01/21/17  1600   AST  --   --   --   --  21   ALT  --   --   --   --  23   ALKPHOS  --   --   --   --  63   BILITOTAL  --   --   --   --  1.0   ALBUMIN  --   --   --   --  2.1*   INR 1.49* 1.53* 2.58* 2.48*  --      Pancreatic Enzymes    Recent Labs  Lab 01/22/17 0500 01/21/17  1600   LIPASE 291 789*     Coagulation Profile    Recent Labs  Lab 01/25/17 0426 01/23/17 1720 01/22/17 1003 01/22/17  0500   INR 1.49* 1.53* 2.58* 2.48*   PTT  --   --  52*  --        =========================================

## 2017-01-28 LAB
ALBUMIN UR-MCNC: 10 MG/DL
ANION GAP SERPL CALCULATED.3IONS-SCNC: 10 MMOL/L (ref 3–14)
APPEARANCE UR: CLEAR
BACTERIA SPEC CULT: NO GROWTH
BILIRUB UR QL STRIP: NEGATIVE
BUN SERPL-MCNC: 22 MG/DL (ref 7–30)
CALCIUM SERPL-MCNC: 7.3 MG/DL (ref 8.5–10.1)
CHLORIDE SERPL-SCNC: 127 MMOL/L (ref 94–109)
CO2 SERPL-SCNC: 25 MMOL/L (ref 20–32)
COLOR UR AUTO: ABNORMAL
CREAT SERPL-MCNC: 0.88 MG/DL (ref 0.66–1.25)
ERYTHROCYTE [DISTWIDTH] IN BLOOD BY AUTOMATED COUNT: 19.8 % (ref 10–15)
GFR SERPL CREATININE-BSD FRML MDRD: 90 ML/MIN/1.7M2
GLUCOSE BLDC GLUCOMTR-MCNC: 108 MG/DL (ref 70–99)
GLUCOSE BLDC GLUCOMTR-MCNC: 126 MG/DL (ref 70–99)
GLUCOSE BLDC GLUCOMTR-MCNC: 209 MG/DL (ref 70–99)
GLUCOSE BLDC GLUCOMTR-MCNC: 228 MG/DL (ref 70–99)
GLUCOSE SERPL-MCNC: 122 MG/DL (ref 70–99)
GLUCOSE UR STRIP-MCNC: 300 MG/DL
HCT VFR BLD AUTO: 32.4 % (ref 40–53)
HGB BLD-MCNC: 9.6 G/DL (ref 13.3–17.7)
HGB UR QL STRIP: NEGATIVE
KETONES UR STRIP-MCNC: NEGATIVE MG/DL
LEUKOCYTE ESTERASE UR QL STRIP: NEGATIVE
Lab: NORMAL
MAGNESIUM SERPL-MCNC: 2.6 MG/DL (ref 1.6–2.3)
MAGNESIUM SERPL-MCNC: 2.7 MG/DL (ref 1.6–2.3)
MCH RBC QN AUTO: 24.2 PG (ref 26.5–33)
MCHC RBC AUTO-ENTMCNC: 29.6 G/DL (ref 31.5–36.5)
MCV RBC AUTO: 82 FL (ref 78–100)
MICRO REPORT STATUS: NORMAL
NITRATE UR QL: NEGATIVE
OSMOLALITY SERPL: 336 MMOL/KG (ref 275–295)
OSMOLALITY UR: 571 MMOL/KG (ref 100–1200)
PH UR STRIP: 7 PH (ref 5–7)
PHOSPHATE SERPL-MCNC: 1.8 MG/DL (ref 2.5–4.5)
PHOSPHATE SERPL-MCNC: 2 MG/DL (ref 2.5–4.5)
PLATELET # BLD AUTO: 198 10E9/L (ref 150–450)
POTASSIUM SERPL-SCNC: 3.6 MMOL/L (ref 3.4–5.3)
POTASSIUM SERPL-SCNC: 3.6 MMOL/L (ref 3.4–5.3)
RBC # BLD AUTO: 3.96 10E12/L (ref 4.4–5.9)
SODIUM SERPL-SCNC: 152 MMOL/L (ref 133–144)
SODIUM SERPL-SCNC: 154 MMOL/L (ref 133–144)
SODIUM SERPL-SCNC: 155 MMOL/L (ref 133–144)
SODIUM SERPL-SCNC: 160 MMOL/L (ref 133–144)
SODIUM SERPL-SCNC: 163 MMOL/L (ref 133–144)
SODIUM SERPL-SCNC: 167 MMOL/L (ref 133–144)
SODIUM UR-SCNC: 131 MMOL/L
SP GR UR STRIP: 1.01 (ref 1–1.03)
SPECIMEN SOURCE: NORMAL
URN SPEC COLLECT METH UR: ABNORMAL
UROBILINOGEN UR STRIP-MCNC: NORMAL MG/DL (ref 0–2)
WBC # BLD AUTO: 15.7 10E9/L (ref 4–11)

## 2017-01-28 PROCEDURE — 27210436 ZZH NUTRITION PRODUCT SEMIELEM INTERMED CAN

## 2017-01-28 PROCEDURE — 25000125 ZZHC RX 250: Performed by: SURGERY

## 2017-01-28 PROCEDURE — 84100 ASSAY OF PHOSPHORUS: CPT | Performed by: SURGERY

## 2017-01-28 PROCEDURE — 85027 COMPLETE CBC AUTOMATED: CPT | Performed by: SURGERY

## 2017-01-28 PROCEDURE — A9270 NON-COVERED ITEM OR SERVICE: HCPCS | Mod: GY | Performed by: SURGERY

## 2017-01-28 PROCEDURE — 81003 URINALYSIS AUTO W/O SCOPE: CPT | Performed by: ANESTHESIOLOGY

## 2017-01-28 PROCEDURE — 99291 CRITICAL CARE FIRST HOUR: CPT | Mod: GC | Performed by: SURGERY

## 2017-01-28 PROCEDURE — 94003 VENT MGMT INPAT SUBQ DAY: CPT

## 2017-01-28 PROCEDURE — 25000125 ZZHC RX 250: Performed by: STUDENT IN AN ORGANIZED HEALTH CARE EDUCATION/TRAINING PROGRAM

## 2017-01-28 PROCEDURE — A9270 NON-COVERED ITEM OR SERVICE: HCPCS | Mod: GY

## 2017-01-28 PROCEDURE — 83735 ASSAY OF MAGNESIUM: CPT | Performed by: ANESTHESIOLOGY

## 2017-01-28 PROCEDURE — 84295 ASSAY OF SERUM SODIUM: CPT | Performed by: SURGERY

## 2017-01-28 PROCEDURE — 25000132 ZZH RX MED GY IP 250 OP 250 PS 637: Mod: GY | Performed by: SURGERY

## 2017-01-28 PROCEDURE — 84300 ASSAY OF URINE SODIUM: CPT | Performed by: ANESTHESIOLOGY

## 2017-01-28 PROCEDURE — 20000004 ZZH R&B ICU UMMC

## 2017-01-28 PROCEDURE — 25000128 H RX IP 250 OP 636: Performed by: ANESTHESIOLOGY

## 2017-01-28 PROCEDURE — 84100 ASSAY OF PHOSPHORUS: CPT | Performed by: ANESTHESIOLOGY

## 2017-01-28 PROCEDURE — 25000125 ZZHC RX 250: Performed by: ANESTHESIOLOGY

## 2017-01-28 PROCEDURE — 40000275 ZZH STATISTIC RCP TIME EA 10 MIN

## 2017-01-28 PROCEDURE — 80048 BASIC METABOLIC PNL TOTAL CA: CPT | Performed by: SURGERY

## 2017-01-28 PROCEDURE — 25000132 ZZH RX MED GY IP 250 OP 250 PS 637: Mod: GY

## 2017-01-28 PROCEDURE — 83935 ASSAY OF URINE OSMOLALITY: CPT | Performed by: ANESTHESIOLOGY

## 2017-01-28 PROCEDURE — 84132 ASSAY OF SERUM POTASSIUM: CPT | Performed by: ANESTHESIOLOGY

## 2017-01-28 PROCEDURE — 00000146 ZZHCL STATISTIC GLUCOSE BY METER IP

## 2017-01-28 PROCEDURE — 25000125 ZZHC RX 250: Performed by: NURSE PRACTITIONER

## 2017-01-28 PROCEDURE — 84295 ASSAY OF SERUM SODIUM: CPT | Performed by: ANESTHESIOLOGY

## 2017-01-28 PROCEDURE — 83930 ASSAY OF BLOOD OSMOLALITY: CPT | Performed by: ANESTHESIOLOGY

## 2017-01-28 PROCEDURE — 83735 ASSAY OF MAGNESIUM: CPT | Performed by: SURGERY

## 2017-01-28 PROCEDURE — 25000125 ZZHC RX 250

## 2017-01-28 PROCEDURE — 25000132 ZZH RX MED GY IP 250 OP 250 PS 637: Mod: GY | Performed by: ANESTHESIOLOGY

## 2017-01-28 RX ORDER — DEXTROSE MONOHYDRATE 50 MG/ML
INJECTION, SOLUTION INTRAVENOUS CONTINUOUS
Status: DISCONTINUED | OUTPATIENT
Start: 2017-01-28 | End: 2017-01-29

## 2017-01-28 RX ORDER — NICOTINE POLACRILEX 4 MG
15-30 LOZENGE BUCCAL
Status: DISCONTINUED | OUTPATIENT
Start: 2017-01-28 | End: 2017-02-10 | Stop reason: HOSPADM

## 2017-01-28 RX ORDER — DESMOPRESSIN ACETATE 4 UG/ML
1 INJECTION, SOLUTION INTRAVENOUS; SUBCUTANEOUS 2 TIMES DAILY
Status: DISCONTINUED | OUTPATIENT
Start: 2017-01-29 | End: 2017-02-01

## 2017-01-28 RX ORDER — DEXTROSE MONOHYDRATE 25 G/50ML
25-50 INJECTION, SOLUTION INTRAVENOUS
Status: DISCONTINUED | OUTPATIENT
Start: 2017-01-28 | End: 2017-02-10 | Stop reason: HOSPADM

## 2017-01-28 RX ORDER — DESMOPRESSIN ACETATE 4 UG/ML
2 INJECTION, SOLUTION INTRAVENOUS; SUBCUTANEOUS 2 TIMES DAILY
Status: DISCONTINUED | OUTPATIENT
Start: 2017-01-28 | End: 2017-01-28

## 2017-01-28 RX ADMIN — DESMOPRESSIN ACETATE 100 MCG: 0.1 TABLET ORAL at 08:08

## 2017-01-28 RX ADMIN — LINEZOLID 600 MG: 100 SUSPENSION ORAL at 20:13

## 2017-01-28 RX ADMIN — PANCRELIPASE 48000 UNITS: 24000; 76000; 120000 CAPSULE, DELAYED RELEASE PELLETS ORAL at 10:24

## 2017-01-28 RX ADMIN — Medication 20 MG: at 08:02

## 2017-01-28 RX ADMIN — VASOPRESSIN 2.4 UNITS/HR: 20 INJECTION, SOLUTION INTRAMUSCULAR; SUBCUTANEOUS at 08:41

## 2017-01-28 RX ADMIN — PANCRELIPASE 48000 UNITS: 24000; 76000; 120000 CAPSULE, DELAYED RELEASE PELLETS ORAL at 21:52

## 2017-01-28 RX ADMIN — QUETIAPINE 50 MG: 50 TABLET, FILM COATED ORAL at 08:06

## 2017-01-28 RX ADMIN — SODIUM BICARBONATE 325 MG: 325 TABLET ORAL at 06:24

## 2017-01-28 RX ADMIN — LINEZOLID 600 MG: 100 SUSPENSION ORAL at 08:03

## 2017-01-28 RX ADMIN — ERTAPENEM SODIUM 1 G: 1 INJECTION, POWDER, LYOPHILIZED, FOR SOLUTION INTRAMUSCULAR; INTRAVENOUS at 18:05

## 2017-01-28 RX ADMIN — CALCIUM CARBONATE 1250 MG: 1250 SUSPENSION ORAL at 17:13

## 2017-01-28 RX ADMIN — Medication 40 MG: at 20:15

## 2017-01-28 RX ADMIN — ENOXAPARIN SODIUM 40 MG: 40 INJECTION SUBCUTANEOUS at 06:54

## 2017-01-28 RX ADMIN — POTASSIUM CHLORIDE 20 MEQ: 1.5 POWDER, FOR SOLUTION ORAL at 06:25

## 2017-01-28 RX ADMIN — POTASSIUM PHOSPHATE, MONOBASIC AND POTASSIUM PHOSPHATE, DIBASIC 9 MMOL: 224; 236 INJECTION, SOLUTION INTRAVENOUS at 08:07

## 2017-01-28 RX ADMIN — Medication 6 MG: at 21:53

## 2017-01-28 RX ADMIN — POTASSIUM PHOSPHATE, MONOBASIC AND POTASSIUM PHOSPHATE, DIBASIC 20 MMOL: 224; 236 INJECTION, SOLUTION INTRAVENOUS at 08:04

## 2017-01-28 RX ADMIN — Medication 137 MCG: at 06:31

## 2017-01-28 RX ADMIN — CARBAMAZEPINE 200 MG: 100 SUSPENSION ORAL at 20:12

## 2017-01-28 RX ADMIN — POTASSIUM PHOSPHATE, MONOBASIC AND POTASSIUM PHOSPHATE, DIBASIC 15 MMOL: 224; 236 INJECTION, SOLUTION INTRAVENOUS at 22:50

## 2017-01-28 RX ADMIN — BRIVARACETAM 50 MG: 50 INJECTION, SUSPENSION INTRAVENOUS at 20:12

## 2017-01-28 RX ADMIN — SODIUM BICARBONATE 325 MG: 325 TABLET ORAL at 10:24

## 2017-01-28 RX ADMIN — CARBAMAZEPINE 200 MG: 100 SUSPENSION ORAL at 08:05

## 2017-01-28 RX ADMIN — Medication 10 MG: at 20:14

## 2017-01-28 RX ADMIN — ACETAMINOPHEN 650 MG: 160 SOLUTION ORAL at 08:06

## 2017-01-28 RX ADMIN — POTASSIUM PHOSPHATE, MONOBASIC AND POTASSIUM PHOSPHATE, DIBASIC 9 MMOL: 224; 236 INJECTION, SOLUTION INTRAVENOUS at 20:12

## 2017-01-28 RX ADMIN — Medication 40 MG: at 08:05

## 2017-01-28 RX ADMIN — SODIUM BICARBONATE 325 MG: 325 TABLET ORAL at 21:52

## 2017-01-28 RX ADMIN — PANCRELIPASE 48000 UNITS: 24000; 76000; 120000 CAPSULE, DELAYED RELEASE PELLETS ORAL at 13:52

## 2017-01-28 RX ADMIN — POTASSIUM PHOSPHATE, MONOBASIC AND POTASSIUM PHOSPHATE, DIBASIC 9 MMOL: 224; 236 INJECTION, SOLUTION INTRAVENOUS at 13:03

## 2017-01-28 RX ADMIN — CALCIUM CARBONATE 1250 MG: 1250 SUSPENSION ORAL at 08:01

## 2017-01-28 RX ADMIN — SODIUM BICARBONATE 325 MG: 325 TABLET ORAL at 13:52

## 2017-01-28 RX ADMIN — PANCRELIPASE 48000 UNITS: 24000; 76000; 120000 CAPSULE, DELAYED RELEASE PELLETS ORAL at 06:24

## 2017-01-28 RX ADMIN — CALCIUM CARBONATE 1250 MG: 1250 SUSPENSION ORAL at 13:03

## 2017-01-28 RX ADMIN — SODIUM BICARBONATE 325 MG: 325 TABLET ORAL at 01:55

## 2017-01-28 RX ADMIN — SODIUM CHLORIDE, PRESERVATIVE FREE 10 ML: 5 INJECTION INTRAVENOUS at 17:02

## 2017-01-28 RX ADMIN — CARBAMAZEPINE 200 MG: 100 SUSPENSION ORAL at 13:02

## 2017-01-28 RX ADMIN — POTASSIUM CHLORIDE 20 MEQ: 29.8 INJECTION, SOLUTION INTRAVENOUS at 18:50

## 2017-01-28 RX ADMIN — PANCRELIPASE 48000 UNITS: 24000; 76000; 120000 CAPSULE, DELAYED RELEASE PELLETS ORAL at 01:55

## 2017-01-28 RX ADMIN — DESMOPRESSIN ACETATE 2 MCG: 4 INJECTION INTRAVENOUS at 20:12

## 2017-01-28 RX ADMIN — SODIUM BICARBONATE 325 MG: 325 TABLET ORAL at 17:03

## 2017-01-28 RX ADMIN — PANCRELIPASE 48000 UNITS: 24000; 76000; 120000 CAPSULE, DELAYED RELEASE PELLETS ORAL at 17:03

## 2017-01-28 RX ADMIN — Medication 15 ML: at 08:06

## 2017-01-28 RX ADMIN — INSULIN ASPART 2 UNITS: 100 INJECTION, SOLUTION INTRAVENOUS; SUBCUTANEOUS at 13:51

## 2017-01-28 RX ADMIN — Medication 81 MG: at 08:02

## 2017-01-28 RX ADMIN — CARBAMAZEPINE 200 MG: 100 SUSPENSION ORAL at 17:02

## 2017-01-28 RX ADMIN — DEXTROSE MONOHYDRATE: 50 INJECTION, SOLUTION INTRAVENOUS at 13:54

## 2017-01-28 RX ADMIN — DEXTROSE MONOHYDRATE: 50 INJECTION, SOLUTION INTRAVENOUS at 20:52

## 2017-01-28 RX ADMIN — BRIVARACETAM 50 MG: 50 INJECTION, SUSPENSION INTRAVENOUS at 08:31

## 2017-01-28 RX ADMIN — OXYCODONE HYDROCHLORIDE 5 MG: 5 SOLUTION ORAL at 03:14

## 2017-01-28 NOTE — PLAN OF CARE
Problem: Goal Outcome Summary  Goal: Goal Outcome Summary  Outcome: No Change  D/I:  Patient admitted with necrotizing pancreatitis.  Patient neuro right side is out, and left side has full movement.  Pupils equal and reactive.  Patient having large urine outputs, SICU notified, Vasopressin and D5 at 100 ml/hr started.  Patient has tube feeds with enzymes at goal.  Patient is trached at baseline on CMV 30% FiO2.  Patient did pressure support trial at 06:00 and was having low MV and apnea alarms at 06:15 was put back on CMV.  Patient getting oxycodone prn for pain control.  Potasium replaced per protocol.    A/P:  Continue to monitor respiratory status and labs.  Continue to monitor urinary output.

## 2017-01-28 NOTE — CONSULTS
Reason for visit/consult: panhypopituitarism and hypernatremia    Primary care provider: Julee Roberson    HPI:  56 year old Male with complicated medical history including panhypopituitarism due to TBI, seizure and spastic hemiplegia. He was admitted on 1/21/17 with increased emesis and respiratory failure requiring mechanical ventilation. CT scan was done showed concerning findings of necrotizing pancreatitis. He did undergo CT guided aspiration of the largest fluid collection in the pancreatic tail in which cultures grew enterobacter cloacae  Reportedly, DDAVP was not resumed on admission. It was restarted on 1/22 and 1/23/17, Dc'd again till 1/27/17. Meanwhile pt was given IV vasopressin.   Na is fluctuating. Highest level was 179. Now down to 167. Pt is unable to give any information due to confusion and mechanical ventilation.     Past Medical/Surgical History:  Past Medical History   Diagnosis Date     Traumatic brain injury (H) 1989     Gastro-oesophageal reflux disease      Unspecified cerebral artery occlusion with cerebral infarction 1989     Seizures (H)      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     Thyroid disease      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      Ventricular tachyarrhythmia (H)      Ventricular fibrillation (H)      Septic shock (H)      Pneumonia      UTI (urinary tract infection)      DVT of upper extremity (deep vein thrombosis) (H)      Tracheostomy care (H)      Spastic hemiplegia affecting dominant side (H)      related to wil injury     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)      Patient has little porductive speech but at baseline can understand simple commands consistently     Past Surgical History   Procedure Laterality Date     Orthopedic surgery       right hand repair     Head & neck surgery       reconstructive facial surgery following accident in 1989     Vascular surgery       Laparoscopic  appendectomy  7/30/2013     Procedure: LAPAROSCOPIC APPENDECTOMY;  LAPAROSCOPIC APPENDECTOMY;  Surgeon: Manish Pierce MD;  Location:  OR     Esophagoscopy, gastroscopy, duodenoscopy (egd), combined  3/13/2014     Procedure: COMBINED ESOPHAGOSCOPY, GASTROSCOPY, DUODENOSCOPY (EGD), BIOPSY SINGLE OR MULTIPLE;  gastroscopy;  Surgeon: Digna Rhodes MD;  Location:  GI     Tracheostomy N/A 9/3/2016     Procedure: TRACHEOSTOMY;  Surgeon: João Ortiz MD;  Location:  OR     Laparoscopic assisted insertion tube gastrotomy N/A 9/7/2016     Procedure: LAPAROSCOPIC ASSISTED INSERTION TUBE GASTROSTOMY;  Surgeon: Manish Pierce MD;  Location:  OR     Tracheostomy N/A 12/2/2016     Procedure: TRACHEOSTOMY;  Surgeon: João Ortiz MD;  Location:  OR     Esophagoscopy, gastroscopy, duodenoscopy (egd), combined N/A 12/6/2016     Procedure: COMBINED ESOPHAGOSCOPY, GASTROSCOPY, DUODENOSCOPY (EGD);  Surgeon: Digna Rhodes MD;  Location:  GI       Allergies:  Allergies   Allergen Reactions     Dilantin [Phenytoin Sodium]        Current Medications   Current Facility-Administered Medications   Medication     dextrose 5% infusion     vasopressin (PITRESSIN) 40 Units in D5W 40 mL infusion     glucose 40 % gel 15-30 g    Or     dextrose 50 % injection 25-50 mL    Or     glucagon injection 1 mg     insulin aspart (NovoLOG) inj (RAPID ACTING)     enoxaparin (LOVENOX) injection 40 mg     acetaminophen (TYLENOL) solution 650 mg     carBAMazepine (TEGretol) suspension 200 mg     levothyroxine (SYNTHROID) suspension 137 mcg     melatonin liquid 6 mg     desmopressin (DDAVP) tablet 100 mcg     hydrocortisone (CORTEF) suspension 20 mg     hydrocortisone (CORTEF) suspension 10 mg     potassium phosphate 3 mmole/ml oral solution 9 mmol     linezolid (ZYVOX) suspension 600 mg     oxyCODONE (ROXICODONE) solution 5-10 mg     Brivaracetam (BRIVIACT) injection 50 mg     potassium phosphate  10 mmol in D5W 250 mL intermittent infusion     potassium phosphate 15 mmol in D5W 250 mL intermittent infusion     potassium phosphate 20 mmol in D5W 500 mL intermittent infusion     potassium phosphate 20 mmol in D5W 250 mL intermittent infusion     potassium phosphate 25 mmol in D5W 500 mL intermittent infusion     lidocaine (LMX4) kit     sodium chloride (PF) 0.9% PF flush 5-50 mL     heparin lock flush 10 UNIT/ML injection 2-5 mL     sodium chloride (PF) 0.9% PF flush 10-20 mL     heparin lock flush 10 UNIT/ML injection 5-10 mL     heparin lock flush 10 UNIT/ML injection 5-10 mL     QUEtiapine (SEROquel) tablet 50 mg     dextrose 10 % 1,000 mL infusion     multivitamins with minerals (CERTAVITE/CEROVITE) liquid 15 mL     amylase-lipase-protease (CREON 24) 67322 UNITS per EC capsule 24,000-48,000 Units    And     sodium bicarbonate tablet 325 mg     ertapenem (INVanz) 1 g vial to attach to  mL bag     aspirin suspension 81 mg     bisacodyl (DULCOLAX) Suppository 10 mg     calcium carbonate suspension 1,250 mg     miconazole (MICATIN; MICRO GUARD) 2 % powder     pantoprazole (PROTONIX) 2 mg/mL suspension 40 mg     testosterone cypionate (DEPOTESTOTERONE CYPIONATE) injection 200 mg     naloxone (NARCAN) injection 0.1-0.4 mg     albuterol neb solution 2.5 mg     Patient is already receiving anticoagulation with heparin, enoxaparin (LOVENOX), warfarin (COUMADIN)  or other anticoagulant medication     HYDROmorphone (PF) (DILAUDID) injection 0.3-0.5 mg     ondansetron (ZOFRAN-ODT) ODT tab 4 mg    Or     ondansetron (ZOFRAN) injection 4 mg     prochlorperazine (COMPAZINE) injection 5-10 mg    Or     prochlorperazine (COMPAZINE) tablet 5-10 mg    Or     prochlorperazine (COMPAZINE) Suppository 25 mg     potassium chloride SA (K-DUR/KLOR-CON M) CR tablet 20-40 mEq     potassium chloride (KLOR-CON) Packet 20-40 mEq     potassium chloride 10 mEq in 100 mL intermittent infusion     potassium chloride 10 mEq in 100 mL  "intermittent infusion with 10 mg lidocaine     potassium chloride 20 mEq in 50 mL intermittent infusion     magnesium sulfate 2 g in NS intermittent infusion (PharMEDium or FV Cmpd)     magnesium sulfate 4 g in 100 mL sterile water (premade)       Family History:  No family history on file.    Social History:  Social History   Substance Use Topics     Smoking status: Former Smoker     Quit date: 04/23/1989     Smokeless tobacco: Not on file     Alcohol Use: No       ROS:  Unable to obtain due to confusion    Exam  Blood pressure 130/74, temperature 101.5  F (38.6  C), temperature source Oral, resp. rate 19, height 1.651 m (5' 5\"), weight 73 kg (160 lb 15 oz), SpO2 98 %.  General: confused  HEENT: Trach in place  Neuro: altered  CV: RRR  Pulm: Mechanically ventilated on exam  Abd: Soft; minimally-tender; non-distended; G-J tube in place; no peritoneal signs  Extremities: no edema  +rectal tube  +condom cath    Labs/Imaging    TSH   Date Value Ref Range Status   12/01/2016 <0.01* 0.40 - 4.00 mU/L Final   11/23/2016 <0.01* 0.40 - 4.00 mU/L Final   09/06/2016 <0.01* 0.40 - 4.00 mU/L Final   07/24/2016 <0.01* 0.40 - 4.00 mU/L Final   12/14/2011 <0.03* 0.4 - 5.0 mU/L Final     T4 FREE   Date Value Ref Range Status   12/01/2016 0.73* 0.76 - 1.46 ng/dL Final   11/24/2016 0.77 0.76 - 1.46 ng/dL Final   09/06/2016 0.71* 0.76 - 1.46 ng/dL Final   07/24/2016 0.91 0.76 - 1.46 ng/dL Final   12/14/2011 1.28 0.70 - 1.85 ng/dL Final     A1C      5.1   11/24/2016  A1C      5.8   8/24/2016      Assessment and Plan  This is a patient with history of TBI and subsequent panhypopituitarism. The pt is currently critically ill and has signs of severe hypernatremia due to under-treated DI and underlying dehydration from his pancreatitis. Patient is having hyperglycemia.    1-Obtain urine osm and specific gravity. Continue to record accurate I&O, consider changing condom cath to aaron for better monitoring. Check Na every 6 hrs.  2-Switch " DDAVP from PO to IV. Give 2 mcg BID  3-Would discontinue Vasopressin unless needed from the hemodynamic standpoint.    4-Check FT4 and continue with levothyroxine 137 mcg daily for now  5-Continue with hydrocortisone 20 mg in AM and 10 mg PM. We can consider stress dose if pt becomes hypotensive or hemodynamically unstable.  6- Continue sliding scale insulin. Will consider long acting insulin depending on BG data.    Patient seen and examined with staff endocrinologist Dr Amilcar Painter MD  Diabetes, Metabolism and Endocrinology Fellow  Pager: [phone]    ATTENDING NOTE    I have seen and examined the patient, reviewed and edited the fellow's note, and agree with the plan of care.    Kerri Fitzgerald MD PhD    Division of Endocrinology and Diabetes

## 2017-01-28 NOTE — PLAN OF CARE
Problem: Goal Outcome Summary  Goal: Goal Outcome Summary  Outcome: No Change  D;  Upon taking over care of pt at 07:00, pt with Vasopressin and D5W running @ 200cc/hour.  Pt's sodium levels continue to decrease.  I:  RN monitored pt's labs during the shift.  RN titrated gtt's per md orders .  A;  Pt's electrolytes are stabilized today which allowed the RN to increase pt's tube feedings to goal rate of 60 cc/hour (see lab values and flow pages in I and O)  Pt continues with low grade temperature of 100.2.  RN stopped pt's vasopressin as pt transitioned to DDAVP oral for feeding tube.  Last sodium level was 145.  Tube feeding free water was changed to 60 cc/4 hours.  Pt sating the chair today from 10:00 unit 13:00, pressure supported on fio2=30% 7/5.  P;  Pt to have an endoscopy on Monday per Dr Pandey from GI dependent on his continued lab values.Continue with current plan of care.    Problem: Restraint for Non-Violent/Non-Self-Destructive Behavior  Goal: Prevent/Manage Potential Problems  Maintain safety of patient and others during period of restraint.  Promote psychological and physical wellbeing.  Prevent injury to skin and involved body parts.  Promote nutrition, hydration, and elimination.   Outcome: No Change  Pt continues to want to pull at his condom catheter as well as trying to pull out his dignishield rectal tube.  Pt continues to have left hand restrained with soft wrist restraints.

## 2017-01-28 NOTE — PROGRESS NOTES
SURGICAL ICU PROGRESS NOTE  January 28, 2017      CO-MORBIDITIES:   TBI  Seizure disorder  Hemiplegia  Panhypopituitarism  Chronic tracheostomy    ASSESSMENT: 56yM with TBI resulting in seizure disorder,spastic hemiplegia, and aphasia, v-fib cardiac arrest, panhypopituitarism and chronic trach who presents as a transfer for necrotizing pancreatitis. Fluid collection at the tail of the pancreas was aspirated and grew enterobacter. Patient's home desmopressin was inadvertently not restarted and developed hypernatremia. Resolved gradually over 48 hours, now hypernatremic again, after discontinuing free water and vasopressin. Resumed vasopressin today, increased free water with goal to gradually correct, otherwise doing well.     PLAN:   Neuro/ pain/ sedation:  - Monitor neurological status. Notify the MD for any acute changes in exam.  - Anti-seizure: Carbamazepine and brivaracetam  - Panhypopituitarism: synthroid, testosterone, desmopression PO (Previously on 100mc PO once daily at [facility])  - Seroquel for sedation    Pulmonary care:   - Wean FiO2 to keep saturation above 90%.  - Will work toward wean to trach dome     Cardiovascular:    - Monitor hemodynamic status.      GI care:   - NPO.  - Tube feeds at goal  - Meds per J tube  - Appreciate GI recs. Plan for cystgastrostomy once Na corrects; at this point planned for Monday     Fluids/ Electrolytes/ Nutrition:   - Hypernatremia improved to 134 yesterday, however now 167 after discontinuation of D5W and vasopressin yesterday. Resumed Vasopressin 2.4 units/hr today as well as D5W at 200cc/hr.   - Endocrine consult pending, will see patient today and make recs regarding desmopressin management, concerned he is not absorbing PO in setting of pancreatitis given its already low oral bioavailability.   - Check sodium levels q4h. Goal Na 155 today. Will titrate D5W to achieve that goal.  - ICU electrolyte replacement protocol  - No indication for  parenteral nutrition.  - Nutrition consulted. Appreciate recs    Renal/ Fluid Balance:     - Will monitor intake and output.  - Condom catheter     Endocrine:    - Hydrocortisone 20mg in AM and 10 mg in PM  - SSI      ID/ Antibiotics:  - Ertapenem and linezolid for pancreatic necrosis   - Necrotic pancreatitis with fluid culture growing enterobacter and VRE     Heme:     - Hgb 9.6      Prophylaxis:    - Mechanical prophylaxis for DVT.  - Lovenox daily  - PPI BID     MSK:     - PT and OT consulted. Appreciate recs.     Lines/ tubes/ drains:  - Trach, PIV x2, G-J tube, L PICC, aaron     Disposition:  - Surgical ICU.    Patient seen, findings and plan discussed with Dr. Obrien.    Nguyễn Eugene MD  CA-1/PGY-2 Anesthesiology  [phone]    ====================================    TODAY'S PROGRESS:   SUBJECTIVE:   Patient appears unchanged neurostatus with now worsened hyponatremia. Denies pain, but has minimal tenderness on exam. Tolerating tube feeds. Voiding to condom cath. Having BM with rectal tube in place    OBJECTIVE:   1. VITAL SIGNS:   Temp:  [99.3  F (37.4  C)-101.7  F (38.7  C)] 101.5  F (38.6  C)  Heart Rate:  [] 145  Resp:  [18-24] 19  BP: ()/(52-91) 130/74 mmHg  FiO2 (%):  [30 %] 30 %  SpO2:  [98 %-100 %] 98 %  Ventilation Mode: CMV/AC  FiO2 (%): 30 %  Rate Set (breaths/minute): 14 breaths/min  Tidal Volume Set (mL): 500 mL  PEEP (cm H2O): 5 cmH2O  Pressure Support (cm H2O): 7 cmH2O  Oxygen Concentration (%): 30 %  Resp: 19    2. INTAKE/ OUTPUT:   I/O last 3 completed shifts:  In: 2872.09 [I.V.:902.09; NG/GT:770]  Out: 5650 [Urine:4850; Stool:800]    3. PHYSICAL EXAMINATION:   General: Awake and alert  HEENT: Trach in place  Neuro: Follows commands.  CV: RRR  Pulm: Mechanically ventilated on exam  Abd: Soft; minimally-tender; non-distended; G-J tube in place; no peritoneal signs  Extremities: WWP    4. INVESTIGATIONS:   Arterial Blood Gases     Recent Labs  Lab 01/22/17  0500  01/21/17  2348   PH 7.56* 7.47*   PCO2 22* 26*   PO2 133* 117*   HCO3 20* 19*     Complete Blood Count     Recent Labs  Lab 01/28/17  0355 01/27/17  0400 01/26/17  1757 01/26/17  0403 01/25/17  0426   WBC 15.7* 9.9  --  8.1 7.2   HGB 9.6* 7.6* 7.6* 6.6* 7.8*    140*  --  141* 162     Basic Metabolic Panel    Recent Labs  Lab 01/28/17  0759 01/28/17  0355 01/28/17  0013 01/27/17  2036  01/27/17  0400  01/26/17 2020 01/26/17 0403 01/25/17  0521   * 163* 152* 150*  < > 152*  < > 156*  < > 162*  < > 171*   POTASSIUM  --  3.6  --   --   --  3.4  --  3.4  --  3.0*  < > 3.3*   CHLORIDE  --  127*  --   --   --  119*  --   --   --  127*  --  135*   CO2  --  25  --   --   --  23  --   --   --  26  --  29   BUN  --  22  --   --   --  22  --   --   --  18  --  16   CR  --  0.88  --   --   --  0.78  --   --   --  0.75  --  0.80   GLC  --  122*  --   --   --  136*  --   --   --  108*  --  138*   < > = values in this interval not displayed.  Liver Function Tests    Recent Labs  Lab 01/25/17  0426 01/23/17  1720 01/22/17  1003 01/22/17  0500 01/21/17  1600   AST  --   --   --   --  21   ALT  --   --   --   --  23   ALKPHOS  --   --   --   --  63   BILITOTAL  --   --   --   --  1.0   ALBUMIN  --   --   --   --  2.1*   INR 1.49* 1.53* 2.58* 2.48*  --      Pancreatic Enzymes    Recent Labs  Lab 01/22/17  0500 01/21/17  1600   LIPASE 291 789*     Coagulation Profile    Recent Labs  Lab 01/25/17  0426 01/23/17  1720 01/22/17  1003 01/22/17  0500   INR 1.49* 1.53* 2.58* 2.48*   PTT  --   --  52*  --        =========================================

## 2017-01-28 NOTE — PROVIDER NOTIFICATION
0800 serum sodium called 167, Dr Obrien notified. Also notified him of pt maintaining HR approx 140 post pressure support trial and no prn med to give for it. He stated he has no orders at this time. Will monitor.

## 2017-01-29 ENCOUNTER — APPOINTMENT (OUTPATIENT)
Dept: PHYSICAL THERAPY | Facility: CLINIC | Age: 57
DRG: 405 | End: 2017-01-29
Attending: INTERNAL MEDICINE
Payer: MEDICARE

## 2017-01-29 ENCOUNTER — APPOINTMENT (OUTPATIENT)
Dept: GENERAL RADIOLOGY | Facility: CLINIC | Age: 57
DRG: 405 | End: 2017-01-29
Attending: INTERNAL MEDICINE
Payer: MEDICARE

## 2017-01-29 ENCOUNTER — APPOINTMENT (OUTPATIENT)
Dept: OCCUPATIONAL THERAPY | Facility: CLINIC | Age: 57
DRG: 405 | End: 2017-01-29
Attending: INTERNAL MEDICINE
Payer: MEDICARE

## 2017-01-29 LAB
ALBUMIN SERPL-MCNC: 1.6 G/DL (ref 3.4–5)
ANION GAP SERPL CALCULATED.3IONS-SCNC: 10 MMOL/L (ref 3–14)
BACTERIA SPEC CULT: NORMAL
BUN SERPL-MCNC: 19 MG/DL (ref 7–30)
CALCIUM SERPL-MCNC: 6.6 MG/DL (ref 8.5–10.1)
CHLORIDE SERPL-SCNC: 116 MMOL/L (ref 94–109)
CO2 SERPL-SCNC: 24 MMOL/L (ref 20–32)
CREAT SERPL-MCNC: 0.78 MG/DL (ref 0.66–1.25)
ERYTHROCYTE [DISTWIDTH] IN BLOOD BY AUTOMATED COUNT: 20 % (ref 10–15)
GFR SERPL CREATININE-BSD FRML MDRD: ABNORMAL ML/MIN/1.7M2
GLUCOSE BLDC GLUCOMTR-MCNC: 101 MG/DL (ref 70–99)
GLUCOSE BLDC GLUCOMTR-MCNC: 111 MG/DL (ref 70–99)
GLUCOSE BLDC GLUCOMTR-MCNC: 119 MG/DL (ref 70–99)
GLUCOSE BLDC GLUCOMTR-MCNC: 126 MG/DL (ref 70–99)
GLUCOSE BLDC GLUCOMTR-MCNC: 134 MG/DL (ref 70–99)
GLUCOSE BLDC GLUCOMTR-MCNC: 145 MG/DL (ref 70–99)
GLUCOSE BLDC GLUCOMTR-MCNC: 151 MG/DL (ref 70–99)
GLUCOSE SERPL-MCNC: 103 MG/DL (ref 70–99)
HCT VFR BLD AUTO: 27 % (ref 40–53)
HGB BLD-MCNC: 8.1 G/DL (ref 13.3–17.7)
Lab: NORMAL
MAGNESIUM SERPL-MCNC: 2.1 MG/DL (ref 1.6–2.3)
MCH RBC QN AUTO: 24.5 PG (ref 26.5–33)
MCHC RBC AUTO-ENTMCNC: 30 G/DL (ref 31.5–36.5)
MCV RBC AUTO: 82 FL (ref 78–100)
MICRO REPORT STATUS: NORMAL
OSMOLALITY UR: 663 MMOL/KG (ref 100–1200)
PHOSPHATE SERPL-MCNC: 1.4 MG/DL (ref 2.5–4.5)
PHOSPHATE SERPL-MCNC: 2.1 MG/DL (ref 2.5–4.5)
PLATELET # BLD AUTO: 168 10E9/L (ref 150–450)
POTASSIUM BLD-SCNC: 3.4 MMOL/L (ref 3.4–5.3)
POTASSIUM SERPL-SCNC: 3.3 MMOL/L (ref 3.4–5.3)
RBC # BLD AUTO: 3.31 10E12/L (ref 4.4–5.9)
SODIUM SERPL-SCNC: 140 MMOL/L (ref 133–144)
SODIUM SERPL-SCNC: 142 MMOL/L (ref 133–144)
SODIUM SERPL-SCNC: 144 MMOL/L (ref 133–144)
SODIUM SERPL-SCNC: 150 MMOL/L (ref 133–144)
SODIUM SERPL-SCNC: 150 MMOL/L (ref 133–144)
SPECIMEN SOURCE: NORMAL
T4 FREE SERPL-MCNC: 0.54 NG/DL (ref 0.76–1.46)
WBC # BLD AUTO: 13.3 10E9/L (ref 4–11)

## 2017-01-29 PROCEDURE — 40000275 ZZH STATISTIC RCP TIME EA 10 MIN

## 2017-01-29 PROCEDURE — 25000125 ZZHC RX 250: Performed by: INTERNAL MEDICINE

## 2017-01-29 PROCEDURE — 97530 THERAPEUTIC ACTIVITIES: CPT | Mod: GO | Performed by: OCCUPATIONAL THERAPIST

## 2017-01-29 PROCEDURE — A9270 NON-COVERED ITEM OR SERVICE: HCPCS | Mod: GY

## 2017-01-29 PROCEDURE — A9270 NON-COVERED ITEM OR SERVICE: HCPCS | Mod: GY | Performed by: SURGERY

## 2017-01-29 PROCEDURE — 97162 PT EVAL MOD COMPLEX 30 MIN: CPT | Mod: GP | Performed by: PHYSICAL THERAPIST

## 2017-01-29 PROCEDURE — 93010 ELECTROCARDIOGRAM REPORT: CPT | Performed by: INTERNAL MEDICINE

## 2017-01-29 PROCEDURE — 25000132 ZZH RX MED GY IP 250 OP 250 PS 637: Mod: GY | Performed by: SURGERY

## 2017-01-29 PROCEDURE — 97530 THERAPEUTIC ACTIVITIES: CPT | Mod: GP | Performed by: PHYSICAL THERAPIST

## 2017-01-29 PROCEDURE — 00000146 ZZHCL STATISTIC GLUCOSE BY METER IP

## 2017-01-29 PROCEDURE — 80048 BASIC METABOLIC PNL TOTAL CA: CPT | Performed by: SURGERY

## 2017-01-29 PROCEDURE — 84295 ASSAY OF SERUM SODIUM: CPT | Performed by: SURGERY

## 2017-01-29 PROCEDURE — 85027 COMPLETE CBC AUTOMATED: CPT | Performed by: SURGERY

## 2017-01-29 PROCEDURE — 74000 XR ABDOMEN PORT F1 VW: CPT

## 2017-01-29 PROCEDURE — 25000128 H RX IP 250 OP 636: Performed by: SURGERY

## 2017-01-29 PROCEDURE — 25000132 ZZH RX MED GY IP 250 OP 250 PS 637: Mod: GY

## 2017-01-29 PROCEDURE — 25000125 ZZHC RX 250: Performed by: STUDENT IN AN ORGANIZED HEALTH CARE EDUCATION/TRAINING PROGRAM

## 2017-01-29 PROCEDURE — 94003 VENT MGMT INPAT SUBQ DAY: CPT

## 2017-01-29 PROCEDURE — 84439 ASSAY OF FREE THYROXINE: CPT | Performed by: SURGERY

## 2017-01-29 PROCEDURE — 83935 ASSAY OF URINE OSMOLALITY: CPT | Performed by: SURGERY

## 2017-01-29 PROCEDURE — 25000125 ZZHC RX 250: Performed by: SURGERY

## 2017-01-29 PROCEDURE — 25000125 ZZHC RX 250

## 2017-01-29 PROCEDURE — 20000004 ZZH R&B ICU UMMC

## 2017-01-29 PROCEDURE — 84100 ASSAY OF PHOSPHORUS: CPT | Performed by: SURGERY

## 2017-01-29 PROCEDURE — 27210436 ZZH NUTRITION PRODUCT SEMIELEM INTERMED CAN

## 2017-01-29 PROCEDURE — 40000133 ZZH STATISTIC OT WARD VISIT: Performed by: OCCUPATIONAL THERAPIST

## 2017-01-29 PROCEDURE — 82040 ASSAY OF SERUM ALBUMIN: CPT | Performed by: SURGERY

## 2017-01-29 PROCEDURE — 84295 ASSAY OF SERUM SODIUM: CPT | Performed by: ANESTHESIOLOGY

## 2017-01-29 PROCEDURE — 84132 ASSAY OF SERUM POTASSIUM: CPT | Performed by: SURGERY

## 2017-01-29 PROCEDURE — 25000125 ZZHC RX 250: Performed by: NURSE PRACTITIONER

## 2017-01-29 PROCEDURE — 93005 ELECTROCARDIOGRAM TRACING: CPT

## 2017-01-29 PROCEDURE — 40000193 ZZH STATISTIC PT WARD VISIT: Performed by: PHYSICAL THERAPIST

## 2017-01-29 PROCEDURE — 99291 CRITICAL CARE FIRST HOUR: CPT | Mod: GC | Performed by: SURGERY

## 2017-01-29 PROCEDURE — 83735 ASSAY OF MAGNESIUM: CPT | Performed by: SURGERY

## 2017-01-29 RX ORDER — CHLORPROMAZINE HCL 50 MG
50 TABLET ORAL 3 TIMES DAILY
Status: DISCONTINUED | OUTPATIENT
Start: 2017-01-29 | End: 2017-01-30

## 2017-01-29 RX ORDER — CHLORPROMAZINE HCL 50 MG
50 TABLET ORAL 3 TIMES DAILY
Status: DISCONTINUED | OUTPATIENT
Start: 2017-01-29 | End: 2017-01-29

## 2017-01-29 RX ADMIN — POTASSIUM PHOSPHATE, MONOBASIC AND POTASSIUM PHOSPHATE, DIBASIC 9 MMOL: 224; 236 INJECTION, SOLUTION INTRAVENOUS at 08:34

## 2017-01-29 RX ADMIN — QUETIAPINE 50 MG: 50 TABLET, FILM COATED ORAL at 23:39

## 2017-01-29 RX ADMIN — INSULIN ASPART 1 UNITS: 100 INJECTION, SOLUTION INTRAVENOUS; SUBCUTANEOUS at 12:22

## 2017-01-29 RX ADMIN — BRIVARACETAM 50 MG: 50 INJECTION, SUSPENSION INTRAVENOUS at 21:03

## 2017-01-29 RX ADMIN — SODIUM BICARBONATE 325 MG: 325 TABLET ORAL at 19:42

## 2017-01-29 RX ADMIN — CALCIUM CARBONATE 1250 MG: 1250 SUSPENSION ORAL at 18:51

## 2017-01-29 RX ADMIN — Medication 81 MG: at 08:34

## 2017-01-29 RX ADMIN — POTASSIUM PHOSPHATE, MONOBASIC AND POTASSIUM PHOSPHATE, DIBASIC 9 MMOL: 224; 236 INJECTION, SOLUTION INTRAVENOUS at 19:46

## 2017-01-29 RX ADMIN — CARBAMAZEPINE 200 MG: 100 SUSPENSION ORAL at 08:34

## 2017-01-29 RX ADMIN — BRIVARACETAM 50 MG: 50 INJECTION, SUSPENSION INTRAVENOUS at 12:10

## 2017-01-29 RX ADMIN — PANCRELIPASE 48000 UNITS: 24000; 76000; 120000 CAPSULE, DELAYED RELEASE PELLETS ORAL at 06:22

## 2017-01-29 RX ADMIN — PHYTONADIONE 10 MG: 10 INJECTION, EMULSION INTRAMUSCULAR; INTRAVENOUS; SUBCUTANEOUS at 18:49

## 2017-01-29 RX ADMIN — ENOXAPARIN SODIUM 40 MG: 40 INJECTION SUBCUTANEOUS at 06:29

## 2017-01-29 RX ADMIN — POTASSIUM CHLORIDE 20 MEQ: 29.8 INJECTION, SOLUTION INTRAVENOUS at 06:41

## 2017-01-29 RX ADMIN — SODIUM CHLORIDE, PRESERVATIVE FREE 5 ML: 5 INJECTION INTRAVENOUS at 17:10

## 2017-01-29 RX ADMIN — PANCRELIPASE 48000 UNITS: 24000; 76000; 120000 CAPSULE, DELAYED RELEASE PELLETS ORAL at 15:45

## 2017-01-29 RX ADMIN — DEXTROSE MONOHYDRATE: 50 INJECTION, SOLUTION INTRAVENOUS at 20:22

## 2017-01-29 RX ADMIN — SODIUM BICARBONATE 325 MG: 325 TABLET ORAL at 08:31

## 2017-01-29 RX ADMIN — Medication 137 MCG: at 06:32

## 2017-01-29 RX ADMIN — SODIUM BICARBONATE 325 MG: 325 TABLET ORAL at 12:16

## 2017-01-29 RX ADMIN — CALCIUM CARBONATE 1250 MG: 1250 SUSPENSION ORAL at 08:33

## 2017-01-29 RX ADMIN — CARBAMAZEPINE 200 MG: 100 SUSPENSION ORAL at 19:45

## 2017-01-29 RX ADMIN — DESMOPRESSIN ACETATE 1 MCG: 4 SOLUTION INTRAVENOUS at 09:44

## 2017-01-29 RX ADMIN — Medication 10 MG: at 19:42

## 2017-01-29 RX ADMIN — POTASSIUM CHLORIDE 20 MEQ: 29.8 INJECTION, SOLUTION INTRAVENOUS at 08:38

## 2017-01-29 RX ADMIN — PROCHLORPERAZINE EDISYLATE 10 MG: 5 INJECTION INTRAMUSCULAR; INTRAVENOUS at 22:54

## 2017-01-29 RX ADMIN — Medication 50 MG: at 22:54

## 2017-01-29 RX ADMIN — ONDANSETRON 4 MG: 2 INJECTION INTRAMUSCULAR; INTRAVENOUS at 22:09

## 2017-01-29 RX ADMIN — POTASSIUM PHOSPHATE, MONOBASIC AND POTASSIUM PHOSPHATE, DIBASIC 15 MMOL: 224; 236 INJECTION, SOLUTION INTRAVENOUS at 09:47

## 2017-01-29 RX ADMIN — ACETAMINOPHEN 650 MG: 160 SOLUTION ORAL at 04:13

## 2017-01-29 RX ADMIN — PANCRELIPASE 48000 UNITS: 24000; 76000; 120000 CAPSULE, DELAYED RELEASE PELLETS ORAL at 01:58

## 2017-01-29 RX ADMIN — SODIUM BICARBONATE 325 MG: 325 TABLET ORAL at 01:58

## 2017-01-29 RX ADMIN — CARBAMAZEPINE 200 MG: 100 SUSPENSION ORAL at 12:16

## 2017-01-29 RX ADMIN — PANCRELIPASE 48000 UNITS: 24000; 76000; 120000 CAPSULE, DELAYED RELEASE PELLETS ORAL at 19:45

## 2017-01-29 RX ADMIN — LINEZOLID 600 MG: 100 SUSPENSION ORAL at 19:47

## 2017-01-29 RX ADMIN — INSULIN ASPART 1 UNITS: 100 INJECTION, SOLUTION INTRAVENOUS; SUBCUTANEOUS at 00:14

## 2017-01-29 RX ADMIN — CALCIUM CARBONATE 1250 MG: 1250 SUSPENSION ORAL at 12:18

## 2017-01-29 RX ADMIN — Medication 20 MG: at 08:34

## 2017-01-29 RX ADMIN — OXYCODONE HYDROCHLORIDE 5 MG: 5 SOLUTION ORAL at 23:39

## 2017-01-29 RX ADMIN — DESMOPRESSIN ACETATE 1 MCG: 4 SOLUTION INTRAVENOUS at 19:44

## 2017-01-29 RX ADMIN — Medication 40 MG: at 19:46

## 2017-01-29 RX ADMIN — Medication 15 ML: at 08:31

## 2017-01-29 RX ADMIN — PANCRELIPASE 48000 UNITS: 24000; 76000; 120000 CAPSULE, DELAYED RELEASE PELLETS ORAL at 12:16

## 2017-01-29 RX ADMIN — ERTAPENEM SODIUM 1 G: 1 INJECTION, POWDER, LYOPHILIZED, FOR SOLUTION INTRAMUSCULAR; INTRAVENOUS at 17:12

## 2017-01-29 RX ADMIN — Medication 40 MG: at 08:34

## 2017-01-29 RX ADMIN — DEXTROSE MONOHYDRATE: 50 INJECTION, SOLUTION INTRAVENOUS at 14:42

## 2017-01-29 RX ADMIN — POTASSIUM PHOSPHATE, MONOBASIC AND POTASSIUM PHOSPHATE, DIBASIC 9 MMOL: 224; 236 INJECTION, SOLUTION INTRAVENOUS at 14:43

## 2017-01-29 RX ADMIN — SODIUM BICARBONATE 325 MG: 325 TABLET ORAL at 15:46

## 2017-01-29 RX ADMIN — LINEZOLID 600 MG: 100 SUSPENSION ORAL at 08:36

## 2017-01-29 RX ADMIN — PANCRELIPASE 48000 UNITS: 24000; 76000; 120000 CAPSULE, DELAYED RELEASE PELLETS ORAL at 08:32

## 2017-01-29 RX ADMIN — Medication 6 MG: at 22:54

## 2017-01-29 RX ADMIN — SODIUM BICARBONATE 325 MG: 325 TABLET ORAL at 06:22

## 2017-01-29 RX ADMIN — CARBAMAZEPINE 200 MG: 100 SUSPENSION ORAL at 15:45

## 2017-01-29 RX ADMIN — DEXTROSE MONOHYDRATE: 50 INJECTION, SOLUTION INTRAVENOUS at 01:57

## 2017-01-29 NOTE — PLAN OF CARE
Problem: Goal Outcome Summary  Goal: Goal Outcome Summary  PT 4A: PT eval completed and treatment initiated. PROM performed to the LEs with increased tone noted in the bilateral hip extensors and ankles. Pt with an extensive BM during the PT session, time spent rolling bilaterally multiple times for clean up. Needs mod Ax1 to roll, often gets agitated. All VSS throughout with 35% FiO2 and 5 PEEP. PT to continue to follow to address ROM and transfers training to aide in recovery of ability to transfer to the w/c. Anticipate discharge back to LTACH vs home with 24 hour care when medically stable.

## 2017-01-29 NOTE — PLAN OF CARE
Problem: Goal Outcome Summary  Goal: Goal Outcome Summary  OT 4AB:  Pt. A X 2 with lift transf. bed- chair on PS at start of session.  Pt.s RRs increased to 50s up in the chair; RT changed back to vent 30% Fio2 RR back into 20s.  Recommend d/c to rehab.

## 2017-01-29 NOTE — PROGRESS NOTES
"                                                                   ENDOCRINE PROGRESS NOTE      Subject:  Doing better today. More alert and awake.    Objective:  Na has improved down to 150 today. Urine output is still high.     Exam  Blood pressure 104/65, temperature 101.7  F (38.7  C), temperature source Oral, resp. rate 18, height 1.651 m (5' 5\"), weight 73 kg (160 lb 15 oz), SpO2 98 %.  General: confused  HEENT: Trach in place  Neuro: altered  CV: RRR  Pulm: Mechanically ventilated on exam  Abd: Soft; minimally-tender; non-distended; G-J tube in place; no peritoneal signs  Extremities: no edema  +rectal tube  +condom cath    Labs/Imaging    TSH   Date Value Ref Range Status   12/01/2016 <0.01* 0.40 - 4.00 mU/L Final   11/23/2016 <0.01* 0.40 - 4.00 mU/L Final   09/06/2016 <0.01* 0.40 - 4.00 mU/L Final   07/24/2016 <0.01* 0.40 - 4.00 mU/L Final   12/14/2011 <0.03* 0.4 - 5.0 mU/L Final     T4 FREE   Date Value Ref Range Status   01/29/2017 0.54* 0.76 - 1.46 ng/dL Final   12/01/2016 0.73* 0.76 - 1.46 ng/dL Final   11/24/2016 0.77 0.76 - 1.46 ng/dL Final   09/06/2016 0.71* 0.76 - 1.46 ng/dL Final   07/24/2016 0.91 0.76 - 1.46 ng/dL Final   A1C      5.1   11/24/2016  A1C      5.8   8/24/2016      Assessment and Plan  This is a patient with history of TBI and subsequent panhypopituitarism. Severe hypernatremia is due to a combination of under-treated DI and underlying dehydration from his pancreatitis. Patient is also having hyperglycemia and other electrolytes abnormalities, most notably hypocalcemia due to pancreatitis and malnutrition during his acute illness.     1-We decreased IV DDAVP to 1 mcg BID, will continue to monitor Na and adjust free water and IV fluids to avoid hyponatremia.   2-Increase levothyroxine to 150 mcg daily  3-Continue with hydrocortisone 20 mg in AM and 10 mg PM.   4-Cont to replace calcium and phosphorus. Consider IV calcium for replacement instead of oral as pt has severe diarrhea and " malabsorption with his pancreatitis. Evaluate EKG.  5- Continue sliding scale insulin. Will consider long acting insulin depending on BG data      Patient seen and examined with staff endocrinologist Dr Amilcar Painter MD  Diabetes, Metabolism and Endocrinology Fellow  Pager: [phone]    ATTENDING NOTE    I have seen and examined the patient, reviewed and edited the fellow's note, and agree with the plan of care.    Kerri Fitzgerald MD PhD    Division of Endocrinology and Diabetes

## 2017-01-29 NOTE — PROGRESS NOTES
01/29/17 1400   Quick Adds   Type of Visit Initial PT Evaluation   Living Environment   Lives With parent(s)   Living Arrangements house   Home Accessibility no concerns   Transportation Available family or friend will provide   Living Environment Comment No family present for eval. However, as per chart review, pt is admitted from [facility]. Has assistance from his mom as needed at home   Self-Care   Dominant Hand left   Usual Activity Tolerance fair   Current Activity Tolerance poor   Regular Exercise yes   Activity/Exercise Type (PT/OT at [facility])   Equipment Currently Used at Home bath bench;grab bar;wheelchair   Activity/Exercise/Self-Care Comment As per chart review, pt had previously be able to stand and pivot with SBA to a w/c. House if handicap accessisble, has help with ADLs as needed   Functional Level Prior   Ambulation 4-->completely dependent   Transferring 2-->assistive person   Toileting 3-->assistive equipment and person   Bathing 3-->assistive equipment and person   Dressing 3-->assistive equipment and person   Cognition 2 - difficulty with organizing thoughts   Fall history within last six months no   Which of the above functional risks had a recent onset or change? transferring   Prior Functional Level Comment Unclear of progress made at [facility], pt may be dependent for all transfers at this time   General Information   Onset of Illness/Injury or Date of Surgery - Date 01/23/17   Referring Physician Yoel Montgomery MD   Patient/Family Goals Statement none stated   Pertinent History of Current Problem (include personal factors and/or comorbidities that impact the POC) This is a patient with history of TBI and subsequent panhypopituitarism. Severe hypernatremia is due to a combination of under-treated DI and underlying dehydration from his pancreatitis. Patient is also having hyperglycemia and other electrolytes abnormalities, most notably hypocalcemia due to pancreatitis and malnutrition during  his acute illness.    Precautions/Limitations fall precautions;oxygen therapy device and L/min   Cognitive Status Examination   Orientation not oriented to person, place or time   Level of Consciousness alert;agitated   Follows Commands and Answers Questions 50% of the time;able to follow single-step instructions   Personal Safety and Judgment impaired   Memory impaired   Cognitive Comment Pt at times agitated, not following all commands   Pain Assessment   Patient Currently in Pain No   Integumentary/Edema   Integumentary/Edema Comments mild LE edema noted, this may be baseline for the pt   Posture    Posture Comments unable to determine sitting posture   Range of Motion (ROM)   ROM Comment limited ankle DF noted, rooke boots ordered   Strength   Strength Comments LE strength of 3/5, able to move antigravity   Bed Mobility   Bed Mobility Comments needs mod Ax1 to roll bilaterally, needs cues for set up   Transfer Skills   Transfer Comments NT   Gait   Gait Comments NT   Balance   Balance Comments NT   Sensory Examination   Sensory Perception Comments appears to have normal sensation throughout   Coordination   Coordination no deficits were identified   Muscle Tone   Muscle Tone Comments increased tone noted in the ankle and hip flexors    General Therapy Interventions   Planned Therapy Interventions balance training;bed mobility training;neuromuscular re-education;strengthening;transfer training   Clinical Impression   Criteria for Skilled Therapeutic Intervention yes, treatment indicated   PT Diagnosis decreased functional mobility   Influenced by the following impairments increased tone and decreased strength affecting mobility   Functional limitations due to impairments inability to return to the home safely   Clinical Presentation Unstable/Unpredictable   Clinical Presentation Rationale PMH affecting mobility   Clinical Decision Making (Complexity) High complexity   Therapy Frequency` (4x/week)   Predicted  Duration of Therapy Intervention (days/wks) 2 weeks   Anticipated Discharge Disposition Long Term Care Facility;Home with Assist   Risk & Benefits of therapy have been explained Yes   Patient, Family & other staff in agreement with plan of care Yes   Clinical Impression Comments PT eval completed and tx initiated   Total Evaluation Time   Total Evaluation Time (Minutes) 5

## 2017-01-29 NOTE — PLAN OF CARE
Pt has been awake, interactive. Failed PST this AM quickly d/t rate in 40s and low volumes, able to occasionally take slower, deeper breaths with lots of prompting. Tried PST again later in day with more positive results - still on at this time. Pt sat in chair majority of afternoon. Endocrinology consulted for recommendations regarding sodium/DDAVP - changed dose to IV d/t suspected poor absorption with the pancreatitis, d/c vasopressin. Decrease D5W to 150 now that UOP has stabilized. Condom cath changed to Lerma for closer monitoring. Spoke with pt's mother regarding home DDAVP dose, made SICU team aware. Plan to monitor sodium q4h, watch potassium levels and UOP, LOC/confusion, continue trying PST trials.    Pt also continues to paw left hand at vent tubing and reach for condom cath, thus requiring continued use of restraint as he is not consistently redirected.

## 2017-01-29 NOTE — PROVIDER NOTIFICATION
SICU Resident Mima Medrano at bedside, verified that she is aware of pt's WBC and also notified her of pt's home DDAVP dose being 150mcg 1x/day and 100 mcg 2x/day. Pt's mother at bedside to discuss care.

## 2017-01-29 NOTE — PROGRESS NOTES
SURGICAL ICU PROGRESS NOTE  January 29, 2017      CO-MORBIDITIES:   TBI  Seizure disorder  Hemiplegia  Panhypopituitarism  Chronic tracheostomy    ASSESSMENT: 56yM with TBI resulting in seizure disorder,spastic hemiplegia, and aphasia, v-fib cardiac arrest, panhypopituitarism and chronic trach who presents as a transfer for necrotizing pancreatitis. Fluid collection at the tail of the pancreas was aspirated and grew enterobacter. Patient's home desmopressin was inadvertently not restarted and developed hypernatremia. Resolved gradually over 48 hours, now hypernatremic again, after discontinuing free water and vasopressin. Resumed vasopressin, titrating free water with goal to gradually correct to NA around 145. Somewhat more tender today and has been more febrile in the past couple days with new leukocytosis. Will benefit from cystgastrostomy tomorrow.    PLAN:   Neuro/ pain/ sedation:  - Monitor neurological status. Notify the MD for any acute changes in exam.  - Anti-seizure: Carbamazepine and brivaracetam  - Panhypopituitarism: synthroid, testosterone, desmopression IV; vasopression  - Seroquel for sedation    Pulmonary care:   - Wean FiO2 to keep saturation above 90%.  - Will work toward wean to trach dome  - Will pressure support again this AM once in chair     Cardiovascular:    - Monitor hemodynamic status.   - Not using vasopression for blood pressure, but rather for its therapeutic effects treating central DI.    GI care:   - NPO.  - Tube feeds at goal  - Meds per J tube  - Appreciate GI recs. Plan for cystgastrostomy once Na corrects; at this point planned for Monday     Fluids/ Electrolytes/ Nutrition:   - Hypernatremia worsened to 167 yesterday, and endocrine consulted. Na 150 this AM.   - Check sodium levels q6h. Goal Na 145 today. Will titrate D5W to achieve that goal.  - ICU electrolyte replacement protocol  - No indication for parenteral nutrition.  - Nutrition consulted. Appreciate recs    Renal/  Fluid Balance:     - Will monitor intake and output.  - Aaron in place     Endocrine:    - Appreciate endocrine recs  - Hydrocortisone 20mg in AM and 10 mg in PM  - SSI      ID/ Antibiotics:  - Ertapenem and linezolid for pancreatic necrosis   - Necrotic pancreatitis with fluid culture growing enterobacter and VRE     Heme:     - Hgb stable; no indication for treatment      Prophylaxis:    - Mechanical prophylaxis for DVT.  - Lovenox daily  - PPI BID     MSK:     - PT and OT consulted. Appreciate recs.     Lines/ tubes/ drains:  - Trach, PIV x2, G-J tube, L PICC, aaron, rectal tube     Disposition:  - Surgical ICU.    Patient seen, findings and plan discussed with Dr. Obrien.  Yoel Montgomery MD  General Surgery PGY-2    ====================================    TODAY'S PROGRESS:   SUBJECTIVE:   Denies pain again tender. No complaints. Tolerating tube feeds. Voiding and having BM. Fever yesterday.    OBJECTIVE:   1. VITAL SIGNS:   Temp:  [98.3  F (36.8  C)-101.7  F (38.7  C)] 101.7  F (38.7  C)  Heart Rate:  [] 98  Resp:  [14-27] 18  BP: ()/(48-90) 94/55 mmHg  FiO2 (%):  [30 %] 30 %  SpO2:  [98 %-100 %] 99 %  Ventilation Mode: CPAP/PS  FiO2 (%): 30 %  Rate Set (breaths/minute): 14 breaths/min  Tidal Volume Set (mL): 500 mL  PEEP (cm H2O): 5 cmH2O  Pressure Support (cm H2O): 7 cmH2O  Oxygen Concentration (%): 30 %  Resp: 18    2. INTAKE/ OUTPUT:   I/O last 3 completed shifts:  In: 6139.67 [I.V.:3779.67; NG/GT:920]  Out: 3950 [Urine:3350; Stool:600]    3. PHYSICAL EXAMINATION:   General: Awake and alert  HEENT: Trach in place  Neuro: Follows commands.  CV: RRR  Pulm: Mechanically ventilated on exam  Abd: Soft; moderately-tender; non-distended; G-J tube in place; no peritoneal signs  Extremities: WWP    4. INVESTIGATIONS:   Arterial Blood Gases   No lab results found in last 7 days.  Complete Blood Count     Recent Labs  Lab 01/29/17  0403 01/28/17  0355 01/27/17  0400 01/26/17  1757 01/26/17  0403   WBC  13.3* 15.7* 9.9  --  8.1   HGB 8.1* 9.6* 7.6* 7.6* 6.6*    198 140*  --  141*     Basic Metabolic Panel    Recent Labs  Lab 01/29/17  0403 01/29/17  0015 01/28/17 2022 01/28/17  1620  01/28/17  0355  01/27/17  0400  01/26/17  0403   * 150* 155* 154*  < > 163*  < > 152*  < > 162*   POTASSIUM 3.3*  --   --  3.6  --  3.6  --  3.4  < > 3.0*   CHLORIDE 116*  --   --   --   --  127*  --  119*  --  127*   CO2 24  --   --   --   --  25  --  23  --  26   BUN 19  --   --   --   --  22  --  22  --  18   CR 0.78  --   --   --   --  0.88  --  0.78  --  0.75   *  --   --   --   --  122*  --  136*  --  108*   < > = values in this interval not displayed.  Liver Function Tests    Recent Labs  Lab 01/25/17 0426 01/23/17  1720 01/22/17  1003   INR 1.49* 1.53* 2.58*     Coagulation Profile    Recent Labs  Lab 01/25/17 0426 01/23/17  1720 01/22/17  1003   INR 1.49* 1.53* 2.58*   PTT  --   --  52*       =========================================

## 2017-01-29 NOTE — PLAN OF CARE
Problem: Goal Outcome Summary  Goal: Goal Outcome Summary  Outcome: Improving  D/I:  Patient admitted with necrotizing pancreatitis.  Patient baseline neuro's of right side hemiplegia, left side purposeful movement.  Patient follows commands.  Patient is alert, and slept well throughout the shift.  Patient has a aaron with adequate urine output, after desmopressin urine output was 50-75 ml/hr.  Patient has a rectal tube with watery brown stools, moderate amount.  Patient's phosphorus was replaced and patients potassium was replaced.  Patient has a baseline trach #7 [place], remains on vent CMV overnight, pressure support trial initiated at 06:00 switched back to CMV at 06:55.  When asked about washing up this morning patient refused, indicated he wanted to get washed up later in the day.    A/P:  Continue to monitor labs and urinary output, replace labs as needed.  Continue to trial pressure support.  Encourage activity during the day.

## 2017-01-30 ENCOUNTER — ANESTHESIA EVENT (OUTPATIENT)
Dept: SURGERY | Facility: CLINIC | Age: 57
DRG: 405 | End: 2017-01-30
Payer: MEDICARE

## 2017-01-30 ENCOUNTER — APPOINTMENT (OUTPATIENT)
Dept: GENERAL RADIOLOGY | Facility: CLINIC | Age: 57
DRG: 405 | End: 2017-01-30
Attending: INTERNAL MEDICINE
Payer: MEDICARE

## 2017-01-30 ENCOUNTER — APPOINTMENT (OUTPATIENT)
Dept: GENERAL RADIOLOGY | Facility: CLINIC | Age: 57
DRG: 405 | End: 2017-01-30
Attending: SURGERY
Payer: MEDICARE

## 2017-01-30 ENCOUNTER — APPOINTMENT (OUTPATIENT)
Dept: OCCUPATIONAL THERAPY | Facility: CLINIC | Age: 57
DRG: 405 | End: 2017-01-30
Attending: INTERNAL MEDICINE
Payer: MEDICARE

## 2017-01-30 ENCOUNTER — ANESTHESIA (OUTPATIENT)
Dept: SURGERY | Facility: CLINIC | Age: 57
DRG: 405 | End: 2017-01-30
Payer: MEDICARE

## 2017-01-30 LAB
ABO + RH BLD: NORMAL
ABO + RH BLD: NORMAL
ANION GAP SERPL CALCULATED.3IONS-SCNC: 11 MMOL/L (ref 3–14)
BASE DEFICIT BLDA-SCNC: NORMAL MMOL/L
BASE DEFICIT BLDV-SCNC: 1.1 MMOL/L
BASE EXCESS BLDA CALC-SCNC: NORMAL MMOL/L
BLD GP AB SCN SERPL QL: NORMAL
BLOOD BANK CMNT PATIENT-IMP: NORMAL
BUN SERPL-MCNC: 18 MG/DL (ref 7–30)
CA-I BLD-MCNC: 4.8 MG/DL (ref 4.4–5.2)
CA-I BLD-MCNC: NORMAL MG/DL (ref 4.4–5.2)
CALCIUM SERPL-MCNC: 6.3 MG/DL (ref 8.5–10.1)
CHLORIDE SERPL-SCNC: 108 MMOL/L (ref 94–109)
CO2 SERPL-SCNC: 22 MMOL/L (ref 20–32)
CREAT SERPL-MCNC: 0.74 MG/DL (ref 0.66–1.25)
CRP SERPL-MCNC: 180 MG/L (ref 0–8)
ERYTHROCYTE [DISTWIDTH] IN BLOOD BY AUTOMATED COUNT: 19.8 % (ref 10–15)
GFR SERPL CREATININE-BSD FRML MDRD: ABNORMAL ML/MIN/1.7M2
GLUCOSE BLD-MCNC: 136 MG/DL (ref 70–99)
GLUCOSE BLD-MCNC: NORMAL MG/DL (ref 70–99)
GLUCOSE BLDC GLUCOMTR-MCNC: 122 MG/DL (ref 70–99)
GLUCOSE BLDC GLUCOMTR-MCNC: 124 MG/DL (ref 70–99)
GLUCOSE BLDC GLUCOMTR-MCNC: 128 MG/DL (ref 70–99)
GLUCOSE BLDC GLUCOMTR-MCNC: 89 MG/DL (ref 70–99)
GLUCOSE SERPL-MCNC: 106 MG/DL (ref 70–99)
GRAM STN SPEC: NORMAL
HCO3 BLD-SCNC: NORMAL MMOL/L (ref 21–28)
HCO3 BLDV-SCNC: 24 MMOL/L (ref 21–28)
HCT VFR BLD AUTO: 25.6 % (ref 40–53)
HGB BLD-MCNC: 7.1 G/DL (ref 13.3–17.7)
HGB BLD-MCNC: 7.6 G/DL (ref 13.3–17.7)
HGB BLD-MCNC: NORMAL G/DL (ref 13.3–17.7)
INR PPP: 1.32 (ref 0.86–1.14)
MAGNESIUM SERPL-MCNC: 1.8 MG/DL (ref 1.6–2.3)
MCH RBC QN AUTO: 23.9 PG (ref 26.5–33)
MCHC RBC AUTO-ENTMCNC: 29.7 G/DL (ref 31.5–36.5)
MCV RBC AUTO: 81 FL (ref 78–100)
MICRO REPORT STATUS: NORMAL
O2/TOTAL GAS SETTING VFR VENT: 40 %
O2/TOTAL GAS SETTING VFR VENT: NORMAL %
PCO2 BLD: NORMAL MM HG (ref 35–45)
PCO2 BLDV: 38 MM HG (ref 40–50)
PH BLD: NORMAL PH (ref 7.35–7.45)
PH BLDV: 7.4 PH (ref 7.32–7.43)
PHOSPHATE SERPL-MCNC: 2.6 MG/DL (ref 2.5–4.5)
PLATELET # BLD AUTO: 163 10E9/L (ref 150–450)
PO2 BLD: NORMAL MM HG (ref 80–105)
PO2 BLDV: 49 MM HG (ref 25–47)
POTASSIUM BLD-SCNC: 3.6 MMOL/L (ref 3.4–5.3)
POTASSIUM BLD-SCNC: NORMAL MMOL/L (ref 3.4–5.3)
POTASSIUM SERPL-SCNC: 3.8 MMOL/L (ref 3.4–5.3)
RBC # BLD AUTO: 3.18 10E12/L (ref 4.4–5.9)
SODIUM BLD-SCNC: 135 MMOL/L (ref 133–144)
SODIUM BLD-SCNC: NORMAL MMOL/L (ref 133–144)
SODIUM SERPL-SCNC: 140 MMOL/L (ref 133–144)
SODIUM SERPL-SCNC: 141 MMOL/L (ref 133–144)
SPECIMEN EXP DATE BLD: NORMAL
SPECIMEN SOURCE: NORMAL
UPPER EUS: NORMAL
WBC # BLD AUTO: 10.7 10E9/L (ref 4–11)

## 2017-01-30 PROCEDURE — 25800025 ZZH RX 258: Performed by: SURGERY

## 2017-01-30 PROCEDURE — 85027 COMPLETE CBC AUTOMATED: CPT | Performed by: SURGERY

## 2017-01-30 PROCEDURE — 82803 BLOOD GASES ANY COMBINATION: CPT | Performed by: ANESTHESIOLOGY

## 2017-01-30 PROCEDURE — 40000275 ZZH STATISTIC RCP TIME EA 10 MIN

## 2017-01-30 PROCEDURE — 25000132 ZZH RX MED GY IP 250 OP 250 PS 637: Mod: GY | Performed by: PHYSICIAN ASSISTANT

## 2017-01-30 PROCEDURE — 25000132 ZZH RX MED GY IP 250 OP 250 PS 637: Mod: GY | Performed by: SURGERY

## 2017-01-30 PROCEDURE — 87070 CULTURE OTHR SPECIMN AEROBIC: CPT | Performed by: INTERNAL MEDICINE

## 2017-01-30 PROCEDURE — 25000125 ZZHC RX 250: Performed by: SURGERY

## 2017-01-30 PROCEDURE — 82947 ASSAY GLUCOSE BLOOD QUANT: CPT | Performed by: ANESTHESIOLOGY

## 2017-01-30 PROCEDURE — A9270 NON-COVERED ITEM OR SERVICE: HCPCS | Mod: GY | Performed by: SURGERY

## 2017-01-30 PROCEDURE — 0F9G30Z DRAINAGE OF PANCREAS WITH DRAINAGE DEVICE, PERCUTANEOUS APPROACH: ICD-10-PCS | Performed by: INTERNAL MEDICINE

## 2017-01-30 PROCEDURE — A9270 NON-COVERED ITEM OR SERVICE: HCPCS | Mod: GY | Performed by: PHYSICIAN ASSISTANT

## 2017-01-30 PROCEDURE — 27210794 ZZH OR GENERAL SUPPLY STERILE: Performed by: INTERNAL MEDICINE

## 2017-01-30 PROCEDURE — 25500064 ZZH RX 255 OP 636: Performed by: INTERNAL MEDICINE

## 2017-01-30 PROCEDURE — 80048 BASIC METABOLIC PNL TOTAL CA: CPT | Performed by: SURGERY

## 2017-01-30 PROCEDURE — 25000132 ZZH RX MED GY IP 250 OP 250 PS 637: Mod: GY

## 2017-01-30 PROCEDURE — 87106 FUNGI IDENTIFICATION YEAST: CPT | Performed by: INTERNAL MEDICINE

## 2017-01-30 PROCEDURE — 25800025 ZZH RX 258

## 2017-01-30 PROCEDURE — 71010 XR CHEST PORT 1 VW: CPT

## 2017-01-30 PROCEDURE — 94003 VENT MGMT INPAT SUBQ DAY: CPT

## 2017-01-30 PROCEDURE — 84295 ASSAY OF SERUM SODIUM: CPT | Performed by: SURGERY

## 2017-01-30 PROCEDURE — 25000125 ZZHC RX 250: Performed by: NURSE ANESTHETIST, CERTIFIED REGISTERED

## 2017-01-30 PROCEDURE — 36000051 ZZH SURGERY LEVEL 2 1ST 30 MIN - UMMC: Performed by: INTERNAL MEDICINE

## 2017-01-30 PROCEDURE — 20000004 ZZH R&B ICU UMMC

## 2017-01-30 PROCEDURE — C1725 CATH, TRANSLUMIN NON-LASER: HCPCS | Performed by: INTERNAL MEDICINE

## 2017-01-30 PROCEDURE — 25000128 H RX IP 250 OP 636: Performed by: NURSE ANESTHETIST, CERTIFIED REGISTERED

## 2017-01-30 PROCEDURE — C1769 GUIDE WIRE: HCPCS | Performed by: INTERNAL MEDICINE

## 2017-01-30 PROCEDURE — A9270 NON-COVERED ITEM OR SERVICE: HCPCS | Mod: GY

## 2017-01-30 PROCEDURE — 36000053 ZZH SURGERY LEVEL 2 EA 15 ADDTL MIN - UMMC: Performed by: INTERNAL MEDICINE

## 2017-01-30 PROCEDURE — 83735 ASSAY OF MAGNESIUM: CPT | Performed by: SURGERY

## 2017-01-30 PROCEDURE — 86900 BLOOD TYPING SEROLOGIC ABO: CPT | Performed by: SURGERY

## 2017-01-30 PROCEDURE — C1876 STENT, NON-COA/NON-COV W/DEL: HCPCS | Performed by: INTERNAL MEDICINE

## 2017-01-30 PROCEDURE — 87186 SC STD MICRODIL/AGAR DIL: CPT | Performed by: INTERNAL MEDICINE

## 2017-01-30 PROCEDURE — 87075 CULTR BACTERIA EXCEPT BLOOD: CPT | Performed by: INTERNAL MEDICINE

## 2017-01-30 PROCEDURE — 25000132 ZZH RX MED GY IP 250 OP 250 PS 637: Mod: GY | Performed by: INTERNAL MEDICINE

## 2017-01-30 PROCEDURE — 37000008 ZZH ANESTHESIA TECHNICAL FEE, 1ST 30 MIN: Performed by: INTERNAL MEDICINE

## 2017-01-30 PROCEDURE — 27210436 ZZH NUTRITION PRODUCT SEMIELEM INTERMED CAN

## 2017-01-30 PROCEDURE — 25000125 ZZHC RX 250: Performed by: INTERNAL MEDICINE

## 2017-01-30 PROCEDURE — 97110 THERAPEUTIC EXERCISES: CPT | Mod: GO | Performed by: OCCUPATIONAL THERAPIST

## 2017-01-30 PROCEDURE — 87077 CULTURE AEROBIC IDENTIFY: CPT | Performed by: INTERNAL MEDICINE

## 2017-01-30 PROCEDURE — 40000133 ZZH STATISTIC OT WARD VISIT: Performed by: OCCUPATIONAL THERAPIST

## 2017-01-30 PROCEDURE — 00000146 ZZHCL STATISTIC GLUCOSE BY METER IP

## 2017-01-30 PROCEDURE — 82330 ASSAY OF CALCIUM: CPT | Performed by: ANESTHESIOLOGY

## 2017-01-30 PROCEDURE — 84100 ASSAY OF PHOSPHORUS: CPT | Performed by: SURGERY

## 2017-01-30 PROCEDURE — 99291 CRITICAL CARE FIRST HOUR: CPT | Mod: GC | Performed by: SURGERY

## 2017-01-30 PROCEDURE — 85610 PROTHROMBIN TIME: CPT | Performed by: SURGERY

## 2017-01-30 PROCEDURE — 40000277 XR SURGERY CARM FLUORO LESS THAN 5 MIN W STILLS: Mod: TC

## 2017-01-30 PROCEDURE — 84295 ASSAY OF SERUM SODIUM: CPT | Performed by: ANESTHESIOLOGY

## 2017-01-30 PROCEDURE — 86140 C-REACTIVE PROTEIN: CPT | Performed by: SURGERY

## 2017-01-30 PROCEDURE — P9041 ALBUMIN (HUMAN),5%, 50ML: HCPCS | Performed by: NURSE ANESTHETIST, CERTIFIED REGISTERED

## 2017-01-30 PROCEDURE — 86850 RBC ANTIBODY SCREEN: CPT | Performed by: SURGERY

## 2017-01-30 PROCEDURE — 0F7D3DZ DILATION OF PANCREATIC DUCT WITH INTRALUMINAL DEVICE, PERCUTANEOUS APPROACH: ICD-10-PCS | Performed by: INTERNAL MEDICINE

## 2017-01-30 PROCEDURE — 86901 BLOOD TYPING SEROLOGIC RH(D): CPT | Performed by: SURGERY

## 2017-01-30 PROCEDURE — 25000125 ZZHC RX 250: Performed by: NURSE PRACTITIONER

## 2017-01-30 PROCEDURE — 25000125 ZZHC RX 250

## 2017-01-30 PROCEDURE — 84132 ASSAY OF SERUM POTASSIUM: CPT | Performed by: ANESTHESIOLOGY

## 2017-01-30 PROCEDURE — 37000009 ZZH ANESTHESIA TECHNICAL FEE, EACH ADDTL 15 MIN: Performed by: INTERNAL MEDICINE

## 2017-01-30 PROCEDURE — 25000565 ZZH ISOFLURANE, EA 15 MIN: Performed by: INTERNAL MEDICINE

## 2017-01-30 DEVICE — STENT SOLUS BILIARY 10FRX01CM DBL PIGTAIL W/INTRO G26829: Type: IMPLANTABLE DEVICE | Status: FUNCTIONAL

## 2017-01-30 DEVICE — STENT ESU AXIOS W/DEL SYS 15MMX10MM 10.8FR 138CM M00553650
Type: IMPLANTABLE DEVICE | Status: NON-FUNCTIONAL
Removed: 2017-02-07

## 2017-01-30 RX ORDER — FENTANYL CITRATE 50 UG/ML
25-50 INJECTION, SOLUTION INTRAMUSCULAR; INTRAVENOUS
Status: DISCONTINUED | OUTPATIENT
Start: 2017-01-30 | End: 2017-01-30 | Stop reason: CLARIF

## 2017-01-30 RX ORDER — LINEZOLID 100 MG/5ML
600 GRANULE, FOR SUSPENSION ORAL EVERY 12 HOURS SCHEDULED
Status: DISCONTINUED | OUTPATIENT
Start: 2017-01-30 | End: 2017-02-03

## 2017-01-30 RX ORDER — QUETIAPINE FUMARATE 50 MG/1
50 TABLET, FILM COATED ORAL 3 TIMES DAILY PRN
Status: DISCONTINUED | OUTPATIENT
Start: 2017-01-30 | End: 2017-02-10 | Stop reason: HOSPADM

## 2017-01-30 RX ORDER — FENTANYL CITRATE 50 UG/ML
INJECTION, SOLUTION INTRAMUSCULAR; INTRAVENOUS PRN
Status: DISCONTINUED | OUTPATIENT
Start: 2017-01-30 | End: 2017-01-30

## 2017-01-30 RX ORDER — DEXAMETHASONE SODIUM PHOSPHATE 4 MG/ML
4 INJECTION, SOLUTION INTRA-ARTICULAR; INTRALESIONAL; INTRAMUSCULAR; INTRAVENOUS; SOFT TISSUE
Status: DISCONTINUED | OUTPATIENT
Start: 2017-01-30 | End: 2017-01-30 | Stop reason: CLARIF

## 2017-01-30 RX ORDER — ONDANSETRON 4 MG/1
4 TABLET, ORALLY DISINTEGRATING ORAL EVERY 30 MIN PRN
Status: DISCONTINUED | OUTPATIENT
Start: 2017-01-30 | End: 2017-01-30

## 2017-01-30 RX ORDER — OXYCODONE HCL 5 MG/5 ML
5-10 SOLUTION, ORAL ORAL EVERY 4 HOURS PRN
Status: DISCONTINUED | OUTPATIENT
Start: 2017-01-30 | End: 2017-02-10 | Stop reason: HOSPADM

## 2017-01-30 RX ORDER — LIDOCAINE 40 MG/G
CREAM TOPICAL
Status: DISCONTINUED | OUTPATIENT
Start: 2017-01-30 | End: 2017-02-10 | Stop reason: HOSPADM

## 2017-01-30 RX ORDER — ALBUMIN, HUMAN INJ 5% 5 %
SOLUTION INTRAVENOUS CONTINUOUS PRN
Status: DISCONTINUED | OUTPATIENT
Start: 2017-01-30 | End: 2017-01-30

## 2017-01-30 RX ORDER — IOPAMIDOL 408 MG/ML
INJECTION, SOLUTION INTRAVASCULAR PRN
Status: DISCONTINUED | OUTPATIENT
Start: 2017-01-30 | End: 2017-01-30

## 2017-01-30 RX ORDER — HYDROMORPHONE HYDROCHLORIDE 1 MG/ML
.3-.5 INJECTION, SOLUTION INTRAMUSCULAR; INTRAVENOUS; SUBCUTANEOUS EVERY 5 MIN PRN
Status: DISCONTINUED | OUTPATIENT
Start: 2017-01-30 | End: 2017-01-30 | Stop reason: CLARIF

## 2017-01-30 RX ORDER — NALOXONE HYDROCHLORIDE 0.4 MG/ML
.1-.4 INJECTION, SOLUTION INTRAMUSCULAR; INTRAVENOUS; SUBCUTANEOUS
Status: DISCONTINUED | OUTPATIENT
Start: 2017-01-30 | End: 2017-01-30

## 2017-01-30 RX ORDER — CARBAMAZEPINE 100 MG/5ML
200 SUSPENSION ORAL 4 TIMES DAILY
Status: DISCONTINUED | OUTPATIENT
Start: 2017-01-30 | End: 2017-02-10 | Stop reason: HOSPADM

## 2017-01-30 RX ORDER — HYDRALAZINE HYDROCHLORIDE 20 MG/ML
2.5-5 INJECTION INTRAMUSCULAR; INTRAVENOUS EVERY 10 MIN PRN
Status: DISCONTINUED | OUTPATIENT
Start: 2017-01-30 | End: 2017-01-30 | Stop reason: CLARIF

## 2017-01-30 RX ORDER — SODIUM CHLORIDE, SODIUM LACTATE, POTASSIUM CHLORIDE, CALCIUM CHLORIDE 600; 310; 30; 20 MG/100ML; MG/100ML; MG/100ML; MG/100ML
1000 INJECTION, SOLUTION INTRAVENOUS CONTINUOUS
Status: DISCONTINUED | OUTPATIENT
Start: 2017-01-30 | End: 2017-01-30

## 2017-01-30 RX ORDER — SODIUM CHLORIDE 9 MG/ML
INJECTION, SOLUTION INTRAVENOUS
Status: DISCONTINUED
Start: 2017-01-30 | End: 2017-02-04 | Stop reason: HOSPADM

## 2017-01-30 RX ORDER — METOCLOPRAMIDE HYDROCHLORIDE 5 MG/ML
10 INJECTION INTRAMUSCULAR; INTRAVENOUS EVERY 6 HOURS PRN
Status: DISCONTINUED | OUTPATIENT
Start: 2017-01-30 | End: 2017-02-10 | Stop reason: HOSPADM

## 2017-01-30 RX ORDER — CALCIUM CHLORIDE 100 MG/ML
INJECTION INTRAVENOUS; INTRAVENTRICULAR PRN
Status: DISCONTINUED | OUTPATIENT
Start: 2017-01-30 | End: 2017-01-30

## 2017-01-30 RX ORDER — DIMENHYDRINATE 50 MG/ML
25 INJECTION, SOLUTION INTRAMUSCULAR; INTRAVENOUS
Status: DISCONTINUED | OUTPATIENT
Start: 2017-01-30 | End: 2017-01-30 | Stop reason: CLARIF

## 2017-01-30 RX ORDER — FLUMAZENIL 0.1 MG/ML
0.2 INJECTION, SOLUTION INTRAVENOUS
Status: DISCONTINUED | OUTPATIENT
Start: 2017-01-30 | End: 2017-01-31

## 2017-01-30 RX ORDER — METOCLOPRAMIDE 10 MG/1
10 TABLET ORAL EVERY 6 HOURS PRN
Status: DISCONTINUED | OUTPATIENT
Start: 2017-01-30 | End: 2017-02-10 | Stop reason: HOSPADM

## 2017-01-30 RX ORDER — ONDANSETRON 2 MG/ML
4 INJECTION INTRAMUSCULAR; INTRAVENOUS EVERY 30 MIN PRN
Status: DISCONTINUED | OUTPATIENT
Start: 2017-01-30 | End: 2017-01-30

## 2017-01-30 RX ADMIN — SODIUM CHLORIDE, POTASSIUM CHLORIDE, SODIUM LACTATE AND CALCIUM CHLORIDE: 600; 310; 30; 20 INJECTION, SOLUTION INTRAVENOUS at 13:25

## 2017-01-30 RX ADMIN — Medication 6 MG: at 20:05

## 2017-01-30 RX ADMIN — Medication 50 MG: at 20:03

## 2017-01-30 RX ADMIN — ROCURONIUM BROMIDE 30 MG: 10 INJECTION INTRAVENOUS at 13:50

## 2017-01-30 RX ADMIN — POTASSIUM PHOSPHATE, MONOBASIC AND POTASSIUM PHOSPHATE, DIBASIC 15 MMOL: 224; 236 INJECTION, SOLUTION INTRAVENOUS at 15:12

## 2017-01-30 RX ADMIN — BRIVARACETAM 50 MG: 50 INJECTION, SUSPENSION INTRAVENOUS at 20:09

## 2017-01-30 RX ADMIN — ALBUMIN (HUMAN): 12.5 SOLUTION INTRAVENOUS at 14:19

## 2017-01-30 RX ADMIN — DESMOPRESSIN ACETATE 1 MCG: 4 SOLUTION INTRAVENOUS at 21:02

## 2017-01-30 RX ADMIN — HYDROCORTISONE SODIUM SUCCINATE 50 MG: 100 INJECTION, POWDER, FOR SOLUTION INTRAMUSCULAR; INTRAVENOUS at 15:18

## 2017-01-30 RX ADMIN — Medication 40 MG: at 07:58

## 2017-01-30 RX ADMIN — PHENYLEPHRINE HYDROCHLORIDE 100 MCG: 10 INJECTION, SOLUTION INTRAMUSCULAR; INTRAVENOUS; SUBCUTANEOUS at 14:08

## 2017-01-30 RX ADMIN — Medication 50 MG: at 08:02

## 2017-01-30 RX ADMIN — ERTAPENEM SODIUM 1 G: 1 INJECTION, POWDER, LYOPHILIZED, FOR SOLUTION INTRAMUSCULAR; INTRAVENOUS at 17:31

## 2017-01-30 RX ADMIN — FENTANYL CITRATE 100 MCG: 50 INJECTION, SOLUTION INTRAMUSCULAR; INTRAVENOUS at 13:50

## 2017-01-30 RX ADMIN — BRIVARACETAM 50 MG: 50 INJECTION, SUSPENSION INTRAVENOUS at 10:00

## 2017-01-30 RX ADMIN — DESMOPRESSIN ACETATE 1 MCG: 4 SOLUTION INTRAVENOUS at 08:15

## 2017-01-30 RX ADMIN — ROCURONIUM BROMIDE 10 MG: 10 INJECTION INTRAVENOUS at 14:57

## 2017-01-30 RX ADMIN — POTASSIUM PHOSPHATE, MONOBASIC AND POTASSIUM PHOSPHATE, DIBASIC 10 MMOL: 224; 236 INJECTION, SOLUTION INTRAVENOUS at 05:56

## 2017-01-30 RX ADMIN — LINEZOLID 600 MG: 100 SUSPENSION ORAL at 20:02

## 2017-01-30 RX ADMIN — CARBAMAZEPINE 200 MG: 100 SUSPENSION ORAL at 07:59

## 2017-01-30 RX ADMIN — Medication 10 MG: at 20:04

## 2017-01-30 RX ADMIN — PHENYLEPHRINE HYDROCHLORIDE 100 MCG: 10 INJECTION, SOLUTION INTRAMUSCULAR; INTRAVENOUS; SUBCUTANEOUS at 14:12

## 2017-01-30 RX ADMIN — MIDAZOLAM HYDROCHLORIDE 1 MG: 1 INJECTION, SOLUTION INTRAMUSCULAR; INTRAVENOUS at 13:37

## 2017-01-30 RX ADMIN — SODIUM CHLORIDE, POTASSIUM CHLORIDE, SODIUM LACTATE AND CALCIUM CHLORIDE 500 ML: 600; 310; 30; 20 INJECTION, SOLUTION INTRAVENOUS at 00:23

## 2017-01-30 RX ADMIN — DEXTROSE MONOHYDRATE: 100 INJECTION, SOLUTION INTRAVENOUS at 08:14

## 2017-01-30 RX ADMIN — SODIUM BICARBONATE 325 MG: 325 TABLET ORAL at 15:09

## 2017-01-30 RX ADMIN — SODIUM BICARBONATE 325 MG: 325 TABLET ORAL at 20:00

## 2017-01-30 RX ADMIN — Medication 81 MG: at 07:57

## 2017-01-30 RX ADMIN — POTASSIUM CHLORIDE 10 MEQ: 14.9 INJECTION, SOLUTION, CONCENTRATE PARENTERAL at 02:37

## 2017-01-30 RX ADMIN — POTASSIUM CHLORIDE 10 MEQ: 14.9 INJECTION, SOLUTION, CONCENTRATE PARENTERAL at 01:20

## 2017-01-30 RX ADMIN — CARBAMAZEPINE 200 MG: 100 SUSPENSION ORAL at 11:58

## 2017-01-30 RX ADMIN — POTASSIUM PHOSPHATE, MONOBASIC AND POTASSIUM PHOSPHATE, DIBASIC 20 MMOL: 224; 236 INJECTION, SOLUTION INTRAVENOUS at 01:15

## 2017-01-30 RX ADMIN — Medication 150 MCG: at 08:03

## 2017-01-30 RX ADMIN — ROCURONIUM BROMIDE 10 MG: 10 INJECTION INTRAVENOUS at 14:38

## 2017-01-30 RX ADMIN — Medication 9 MMOL: at 07:59

## 2017-01-30 RX ADMIN — CALCIUM CARBONATE 1250 MG: 1250 SUSPENSION ORAL at 17:31

## 2017-01-30 RX ADMIN — PANCRELIPASE 48000 UNITS: 24000; 76000; 120000 CAPSULE, DELAYED RELEASE PELLETS ORAL at 19:59

## 2017-01-30 RX ADMIN — Medication 20 MG: at 07:57

## 2017-01-30 RX ADMIN — LINEZOLID 600 MG: 100 SUSPENSION ORAL at 08:00

## 2017-01-30 RX ADMIN — CALCIUM CARBONATE 1250 MG: 1250 SUSPENSION ORAL at 11:59

## 2017-01-30 RX ADMIN — CALCIUM CARBONATE 1250 MG: 1250 SUSPENSION ORAL at 07:57

## 2017-01-30 RX ADMIN — Medication 50 MG: at 15:12

## 2017-01-30 RX ADMIN — POTASSIUM CHLORIDE 20 MEQ: 29.8 INJECTION, SOLUTION INTRAVENOUS at 04:45

## 2017-01-30 RX ADMIN — CARBAMAZEPINE 200 MG: 100 SUSPENSION ORAL at 20:03

## 2017-01-30 RX ADMIN — Medication 40 MG: at 20:02

## 2017-01-30 RX ADMIN — CALCIUM CHLORIDE 500 MG: 100 INJECTION, SOLUTION INTRAVENOUS at 14:23

## 2017-01-30 RX ADMIN — HYDROCORTISONE SODIUM SUCCINATE 50 MG: 100 INJECTION, POWDER, FOR SOLUTION INTRAMUSCULAR; INTRAVENOUS at 22:51

## 2017-01-30 RX ADMIN — PANCRELIPASE 24000 UNITS: 24000; 76000; 120000 CAPSULE, DELAYED RELEASE PELLETS ORAL at 15:11

## 2017-01-30 RX ADMIN — POTASSIUM PHOSPHATE, MONOBASIC AND POTASSIUM PHOSPHATE, DIBASIC 15 MMOL: 224; 236 INJECTION, SOLUTION INTRAVENOUS at 22:51

## 2017-01-30 RX ADMIN — Medication 15 ML: at 07:54

## 2017-01-30 RX ADMIN — Medication 2 G: at 05:19

## 2017-01-30 RX ADMIN — CALCIUM CHLORIDE 500 MG: 100 INJECTION, SOLUTION INTRAVENOUS at 14:30

## 2017-01-30 RX ADMIN — CARBAMAZEPINE 200 MG: 100 SUSPENSION ORAL at 15:13

## 2017-01-30 RX ADMIN — PHENYLEPHRINE HYDROCHLORIDE 100 MCG: 10 INJECTION, SOLUTION INTRAMUSCULAR; INTRAVENOUS; SUBCUTANEOUS at 14:02

## 2017-01-30 ASSESSMENT — ENCOUNTER SYMPTOMS: SEIZURES: 1

## 2017-01-30 NOTE — ANESTHESIA CARE TRANSFER NOTE
Patient: Keyon Farias    ENDOSCOPIC ULTRASOUND, ESOPHAGOSCOPY / UPPER GASTROINTESTINAL TRACT (GI) (N/A Esophagus)  Additional InformationProcedure(s):  Endoscopic Ultrasound With Cyst Gastrostomy and balloon dilation of the cyst Gastrostomy with Two stent Placement(vented) - Wound Class: II-Clean Contaminated    Diagnosis: Necrotizing Pancreatitis   Diagnosis Additional Information: No value filed.    Anesthesia Type:   No value filed.     Note:  Airway :Tracheostomy  Patient transferred to:ICU  Comments: Pt on ICU bed to 4A, with Biv[place] trach in place, VSS upon arrival, placed on previous vent settings, report to RN, care accepted.      Vitals: (Last set prior to Anesthesia Care Transfer)              Electronically Signed By: NATHANIEL Walker CRNA  January 30, 2017  3:26 PM

## 2017-01-30 NOTE — PROGRESS NOTES
SPIRITUAL HEALTH SERVICES  [facility] ([place] 4A   PRE-SURGERY VISIT    Had pre-surgery visit with pt Keyon and mother Savannah based on referral through 3C request list. Provided spiritual support, prayer. Priest hdz notified by voicemail of Keyon's surgery plans for today.    Carolynn Guy M.T.S.  Associate   Pager [phone]

## 2017-01-30 NOTE — ANESTHESIA PREPROCEDURE EVALUATION
Anesthesia Evaluation     .        ROS/MED HX    ENT/Pulmonary:     (+), recent URI . .    Neurologic:     (+)seizures CVA TBI, panhypoptuitarty.     Cardiovascular:         METS/Exercise Tolerance:     Hematologic:  - neg hematologic  ROS       Musculoskeletal:  - neg musculoskeletal ROS       GI/Hepatic:  - neg GI/hepatic ROS   (+) Other GI/Hepatic pancreatitis      Renal/Genitourinary:  - ROS Renal section negative       Endo:  - neg endo ROS   (+) Other Endocrine Disorder pan hypopituitary 2nd to TBI.      Psychiatric:        Psychiatric history: , hemiplegia.   Infectious Disease:   (+) Other Infectious Disease pneumonit, pancreatitis      Malignancy:         Other:                              Anesthesia Plan      History & Physical Review  History and physical reviewed and following examination; no interval change.    ASA Status:  3 .        Plan for General          Postoperative Care      Consents

## 2017-01-30 NOTE — ANESTHESIA POSTPROCEDURE EVALUATION
Patient: Keyon Farias    ENDOSCOPIC ULTRASOUND, ESOPHAGOSCOPY / UPPER GASTROINTESTINAL TRACT (GI) (N/A Esophagus)  Additional InformationProcedure(s):  Endoscopic Ultrasound With Cyst Gastrostomy and balloon dilation of the cyst Gastrostomy with Two stent Placement(vented) - Wound Class: II-Clean Contaminated    Diagnosis:Necrotizing Pancreatitis   Diagnosis Additional Information: No value filed.    Anesthesia Type:  No value filed.    Note:  Anesthesia Post Evaluation    Patient location during evaluation: ICU  Patient participation: Unable to evaluate secondary to administered sedation  Pain management: unable to assess  Airway patency: patent  Respiratory status: ventilator  Hydration status: acceptable  PONV: unable to assess     Anesthetic complications: None    Comments: Transported to ICU on ventilator. Care of patient handed to ICU staff after detailed report        Last vitals:  Filed Vitals:    01/30/17 1300 01/30/17 1530 01/30/17 1600   BP: 108/67 115/65 114/66   Temp:   36.2  C (97.2  F)   Resp:   14   SpO2: 100%  100%       Electronically Signed By: Chandrakant Rooney MD  January 30, 2017  5:34 PM

## 2017-01-30 NOTE — PLAN OF CARE
Problem: Restraint for Non-Violent/Non-Self-Destructive Behavior  Goal: Prevent/Manage Potential Problems  Maintain safety of patient and others during period of restraint.  Promote psychological and physical wellbeing.  Prevent injury to skin and involved body parts.  Promote nutrition, hydration, and elimination.   Outcome: No Change  Pt continues to try and pull at aaron and trach when repositioning. Will continue to reevaluate the need for restraints.

## 2017-01-30 NOTE — PLAN OF CARE
Problem: Pancreatitis, Acute/Chronic (Adult)  Goal: Signs and Symptoms of Listed Potential Problems Will be Absent or Manageable (Pancreatitis, Acute/Chronic)  Signs and symptoms of listed potential problems will be absent or manageable by discharge/transition of care (reference Pancreatitis, Acute/Chronic (Adult) CPG).   Outcome: No Change  Pt down to OR for ERCP today. Two stents placed. VSS, afebrile. EBL 5cc. Plan to pressure support again this afternoon when pt more awake. Will continue to monitor and notify MD of any acute concerns.

## 2017-01-30 NOTE — PROGRESS NOTES
[facility] Progress Note          Assessment and Plan:   55 yo male patient with remote history of TBI and subsequent panhypopituitarism admitted with necrotizing pancreatitis; has been on antibiotics and supportive treatment and plan for cystogastrostomy today. Consulted for panhypopituitarism management.       Plan:   Diabetes Insipidus:  Na stable now, urine osmolality in the 600's yesterday, net positive 2.6 liters yesterday.  He is currently on DDAVP 1 mcg BID. Continue same dose for now.  Will follow along.  Continue home dose at the time of discharge.     Secondary adrenal insufficiency: consider doubling or tripling maintenance dose of hydrocortisone prior to procedures or during acute clinical status changes (acute stress). Give hydrocortisone 50 mg IV prior to planned procedure today. Increase maintenance dose of hydrocortisone to 25 PO BID (instead of 20 and 10). Will reassess in am.     Secondary hypothyroidism: Continue Levothyroxine 150 mcg daily for now; dose increased from 137 mcg daily yesterday.  Recheck free T4 in two weeks.          Secondary hypogonadism: continue with PTA replacement dose.     Hyperglycemia in a setting of critical illness: stable.  Continue ISS.      Endocrinologist:  Dr. Adarsh Luu. Follow up as an outpatient with Endocrinology.         Interval History:   Met with patient's mother and patient.  Diagnosed 20+ years after TBI.  On replacement at home hydrocortisone 20 am and 10 PM, DDAVP oral 100 TID PRN, testosterone 200 mcg q two weeks, levothyroxine 137 mcg daily.    His mom asking if his steroid dose is appropriate and adequate?    Open to switch to DDAVP intranasal if needed.   Endocrinologist:  Dr. Adarsh Luu.               Medications:       sodium chloride (PF)  3 mL Intracatheter Q8H     linezolid  600 mg Oral or J tube Q12H [facility]     hydrocortisone  20 mg Per J Tube Daily     hydrocortisone  10 mg Per J Tube QPM     carBAMazepine  200 mg Per J  "Tube 4x Daily     calcium carbonate  1,250 mg Per J Tube TID w/meals     aspirin  81 mg Per J Tube Daily     levothyroxine  150 mcg Per J Tube QAM AC     potassium phosphate  15 mmol Per J Tube Q8H ALONDRA     melatonin  6 mg Per J Tube QPM     chlorproMAZINE  50 mg Per J Tube TID     insulin aspart  1-6 Units Subcutaneous Q4H     desmopressin  1 mcg Intravenous BID     enoxaparin  40 mg Subcutaneous Q24H     Brivaracetam  50 mg Intravenous BID     heparin lock flush  5-10 mL Intracatheter Q24H     multivitamins with minerals  15 mL Per J Tube Daily     amylase-lipase-protease  1-2 capsule Per J Tube Q4H    And     sodium bicarbonate  325 mg Per J Tube Q4H     ertapenem (INVanz) IV  1 g Intravenous Q24H     bisacodyl  10 mg Rectal Daily     pantoprazole  40 mg Per Feeding Tube BID     testosterone cypionate  200 mg Intramuscular Weekly        Physical Examinations:  BP 92/58 mmHg  Temp(Src) 98.1  F (36.7  C) (Axillary)  Resp 20  Ht 1.651 m (5' 5\")  Wt 73 kg (160 lb 15 oz)  BMI 26.78 kg/m2  SpO2 100%  Body mass index is 26.78 kg/(m^2).  Constitutional: pleasant, chronic trach.     Neurological: alert, follows instructions and responds to questions appropriately.           Data:   Last Basic Metabolic Panel:  NA      140   1/30/2017   POTASSIUM      3.8   1/30/2017  CHLORIDE      108   1/30/2017  STEVE      6.3   1/30/2017  CO2       22   1/30/2017  BUN       18   1/30/2017  CR     0.74   1/30/2017  GLC      106   1/30/2017    Patient seen and discussed with Endocrinology attending Dr. Miller.       Faizan Mclean MD  Endocrinology Fellow /[phone]      Patient discussed with fellow Dr Mclean.  Findings and plan as above.    Neville Miller MD   [phone]      "

## 2017-01-30 NOTE — PLAN OF CARE
Problem: Individualization  Goal: Patient Preferences  Outcome: No Change  D/I:Neuro status remains unchanged. Right side weak and spastic and left side works well. Patient is very impulsive and continues to grab at aaron and rectal tube. Afebrile throughout the day. Sinus tach. BPs stable: 100s/60s-70s. Patient pressure supported for 3 hrs-became tachypneic and was switched back to CMV. Minimal secretions throughout the day. Coarse lung sounds. Jtube difficult to flush but continues to be at goal with tube feeds. Patient had a medium emesis Rectal tube leaking- balloon reinflated and is currently working. Loose, seedy BMs. Adequate urine output approx 75ml/hr. Coccyx appears to be reddened-new mepilex applied. Left arm scab assessed- asked for wound consult.  Patient in chair for approx 3 hrs with lift. Restless throughout the day.     A/P: Assess neuro status. Promote independence. Promote respiratory toilet. Keep hemodynamically stable. Treat pain. Educate patient and family

## 2017-01-30 NOTE — PLAN OF CARE
Problem: Goal Outcome Summary  Goal: Goal Outcome Summary  Outcome: Improving  Neuro: Pupils equal and reactive. Follows commands. +5 on RUE, can wiggle toes on right side. Left side does not move at all and has no sensation per pt. Impulsive, grabs at Lerma and trach. Mitt and restraint on left arm.   CV: Pt became tachy. -140 after large emesis. MD notified. Per MD given 500 bolus of LR. Around 0300 HR decreased to 90-110s. BP stable. 90-100s SBP.   Resp: Lung sounds are clear, diminished and course at the bases. After emesis pt had some green secretions from his trach. After this was suctioned pt had clear secretions. Pt had hiccups throughout the night that is thought to possibly be the trigger for emesis.   GI: Pt has 2 emesis episodes overnight. 1 very large episode.EKG, Ab and Chest xray were done.  JG tube. G tube to continuous medium suction per MD. J tube TF. Prior to emesis pt TF setting was 60cc hr with 60 cc flushes Q4. Now per MD TF are 10 cc/hr and 10 flushes Q4. Active bowel sounds. Rectal tube in place. Seedy output. Ab is distended.   :Lerma in place. Minimal output about 25cc/hr.   Pain: After 2nd large emesis pt became increasingly agitated, non-verbal indicators of pain were present. Would nod head  yes  when asked if stomach hurt. Given Seroquel and Oxy overnight.  Non- verbal indicators of pain resolved and pt slept through the night.   Skin: Blanchable coccyx with mepliex. Left arm has eschar  scab.  IV/Gtts: Right triple lumen PICC.  TKO of D5 @10.   Plan: ERCP this AM. WOC consult for left eschar scab.

## 2017-01-30 NOTE — PROGRESS NOTES
SURGICAL ICU PROGRESS NOTE  January 30, 2017      CO-MORBIDITIES:   TBI  Seizure disorder  Hemiplegia  Panhypopituitarism  Chronic tracheostomy    ASSESSMENT: 56yM with TBI resulting in seizure disorder,spastic hemiplegia, and aphasia, v-fib cardiac arrest, panhypopituitarism and chronic trach who presents as a transfer for necrotizing pancreatitis. Fluid collection at the tail of the pancreas was aspirated and grew enterobacter. Patient's home desmopressin was inadvertently not restarted and developed hypernatremia. Now resolved and stable in the low 140s. Tender today and has been more febrile in the past couple days. Leukocytosis has resolved. Will benefit from cystgastrostomy today.    PLAN:   Neuro/ pain/ sedation:  - Monitor neurological status. Notify the MD for any acute changes in exam.  - Anti-seizure: Carbamazepine and brivaracetam  - Panhypopituitarism: synthroid, testosterone, desmopression IV  - Seroquel for sedation    Pulmonary care:   - Wean FiO2 to keep saturation above 90%.  - Will work toward wean to trach dome  - Will pressure support again this AM once in chair     Cardiovascular:    - Monitor hemodynamic status.      GI care:   - NPO.  - Tube feeds at goal  - Meds per J tube  - Appreciate GI recs. Plan for cystgastrostomy today     Fluids/ Electrolytes/ Nutrition:   - Hypernatremia resolved yesterday.    - Check sodium levels q6h. Goal Na 140 today.  - ICU electrolyte replacement protocol  - No indication for parenteral nutrition.  - Nutrition consulted. Appreciate recs    Renal/ Fluid Balance:     - Will monitor intake and output.  - Lerma in place     Endocrine:    - Appreciate endocrine recs  - Hydrocortisone 20mg in AM and 10 mg in PM  - SSI      ID/ Antibiotics:  - Ertapenem and linezolid for pancreatic necrosis   - Necrotic pancreatitis with fluid culture growing enterobacter and VRE     Heme:     - Hgb stable; no indication for treatment      Prophylaxis:    - Mechanical prophylaxis  for DVT.  - Lovenox daily - will hold today for GI procedure  - PPI BID     MSK:     - PT and OT consulted. Appreciate recs.     Lines/ tubes/ drains:  - Trach, PIV x2, G-J tube, L PICC, aaron, rectal tube     Disposition:  - Surgical ICU.    Patient seen, findings and plan discussed with Dr. Montez.    Yoel Montgomery MD  General Surgery PGY-2    ====================================    TODAY'S PROGRESS:   SUBJECTIVE:   Emesis x3 overnight. Tube feeds decreased. AXR and CXR unremarkable. Denies pain. No complaints. Voiding and having BM. Low grade fever yesterday.    OBJECTIVE:   1. VITAL SIGNS:   Temp:  [98.1  F (36.7  C)-99.8  F (37.7  C)] 98.1  F (36.7  C)  Heart Rate:  [] 85  Resp:  [15-24] 20  BP: ()/(55-81) 84/64 mmHg  FiO2 (%):  [30 %] 30 %  SpO2:  [98 %-100 %] 100 %  Ventilation Mode: CMV/AC  FiO2 (%): 30 %  Rate Set (breaths/minute): 14 breaths/min  Tidal Volume Set (mL): 500 mL  PEEP (cm H2O): 5 cmH2O  Pressure Support (cm H2O): 10 cmH2O  Oxygen Concentration (%): 30 %  Resp: 20    2. INTAKE/ OUTPUT:   I/O last 3 completed shifts:  In: 4525 [I.V.:2875; NG/GT:560]  Out: 2210 [Urine:1310; Emesis/NG output:400; Stool:500]    3. PHYSICAL EXAMINATION:   General: Awake and alert  HEENT: Trach in place  Neuro: Follows commands.  CV: RRR  Pulm: Mechanically ventilated on exam  Abd: Soft; moderately-tender; non-distended; G-J tube in place; no peritoneal signs  Extremities: WWP    4. INVESTIGATIONS:   Arterial Blood Gases   No lab results found in last 7 days.  Complete Blood Count     Recent Labs  Lab 01/30/17  0340 01/29/17  0403 01/28/17  0355 01/27/17  0400   WBC 10.7 13.3* 15.7* 9.9   HGB 7.6* 8.1* 9.6* 7.6*    168 198 140*     Basic Metabolic Panel    Recent Labs  Lab 01/30/17  0340 01/29/17  2150 01/29/17  1759 01/29/17  1255 01/29/17  0403  01/28/17  1620  01/28/17  0355  01/27/17  0400    140 142 144 150*  < > 154*  < > 163*  < > 152*   POTASSIUM 3.8 3.4  --   --  3.3*  --  3.6  --   3.6  --  3.4   CHLORIDE 108  --   --   --  116*  --   --   --  127*  --  119*   CO2 22  --   --   --  24  --   --   --  25  --  23   BUN 18  --   --   --  19  --   --   --  22  --  22   CR 0.74  --   --   --  0.78  --   --   --  0.88  --  0.78   *  --   --   --  103*  --   --   --  122*  --  136*   < > = values in this interval not displayed.  Liver Function Tests    Recent Labs  Lab 01/30/17  0340 01/29/17  1255 01/25/17  0426 01/23/17  1720   ALBUMIN  --  1.6*  --   --    INR 1.32*  --  1.49* 1.53*     Coagulation Profile    Recent Labs  Lab 01/30/17  0340 01/25/17  0426 01/23/17  1720   INR 1.32* 1.49* 1.53*     5. RADIOLOGY:   Recent Results (from the past 24 hour(s))   XR Abdomen Port 1 View    Narrative    EXAMINATION: XR ABDOMEN PORT F1 VW  1/29/2017 10:49 PM      CLINICAL HISTORY: hiccups, vomiting     COMPARISON: CT 1/25/2017        FINDINGS:  Frontal view of the abdomen. Gas tracheostomy tube is in place with  the tip projecting over proximal jejunum. Nonspecific paucity of air  within the small bowel with visualized gas within the colon. No  appreciable pneumatosis or portal venous gas. Partially visualized  lungs demonstrates bibasilar opacity.        Impression    IMPRESSION:  1. Nonspecific bowel gas pattern with paucity of air within the small  bowel and visualized gas within the colon.  2. Partially visualized bibasilar lung opacities.    I have personally reviewed the examination and initial interpretation  and I agree with the findings.    KADE KYLE MD   XR Chest Port 1 View    Narrative    EXAMINATION: XR CHEST PORT 1 VW  1/30/2017 1:27 AM      CLINICAL HISTORY: Vomiting with possible aspiration     COMPARISON: 1/25/2017        FINDINGS:  Single portable frontal view of the chest. Tracheostomy tube tip  projects over mid thoracic trachea. Left PICC tip over mid SVC. The  cardiac silhouette is largely obscured. Continued low lungs volumes.  No significant change in left greater  than right bilateral perihilar  and bibasilar opacities. A small left pleural effusion, unchanged.       Impression    IMPRESSION:  1. Low lung volumes. Bibasilar and perihilar opacities are unchanged.  2. Small left pleural effusion.    I have personally reviewed the examination and initial interpretation  and I agree with the findings.    YOVANY RODRIGUEZ MD       =========================================

## 2017-01-30 NOTE — PLAN OF CARE
Problem: Restraint for Non-Violent/Non-Self-Destructive Behavior  Goal: Prevent/Manage Potential Problems  Maintain safety of patient and others during period of restraint.  Promote psychological and physical wellbeing.  Prevent injury to skin and involved body parts.  Promote nutrition, hydration, and elimination.   Outcome: No Change  Pt continues to pull at lines.RN attempted to disguise lines and reorient pt without success. Mitt and restraint continued on left arm.

## 2017-01-30 NOTE — PROGRESS NOTES
01/30/17 1300   Visit Information   Visit Made By Priest Solis   Type of Visit Follow-up;On-call;Staff consultation/triage;Surgical   Visited Patient   Interventions   Basic Spiritual Interventions   Assessment of spiritual needs/resources;Prayer   Ritual/Sacramental Encounter  Other   Advanced Assessments/Interventions   Presenting Concerns/Issues Spiritual/Restoration/emotional support;Challenged coping   SPIRITUAL HEALTH SERVICES Progress Note  [facility] ([place] 4A       DATA:    Visited with pt per staff  referral. Pt was alert and in pain during this visit. This is a pt I had anointed just few days ago.       INTERVENTION:    Offered spiritual support and prayer.        OUTCOME:    Pt wasn't able to talk during this visit, but able to make a sign of the cross at the beginning and the end of the prayer. He obviously was happy that I stopped by and prayed with him.       PLAN:    I will continue to f/u 1-2x/wk while pt remains in ICU.                                                                                                                                             Father Rolando Solis

## 2017-01-30 NOTE — PLAN OF CARE
Problem: Goal Outcome Summary  Goal: Goal Outcome Summary  Outcome: Therapy, progress toward functional goals as expected  OT 4A: Provided PROM with low-load long-duration technique to RUE to increase ROM and prevent further contractures. Focus on R shoulder in external rotation, flexion, MCP extension and DIP extention and PIP flexion. Recommend discharge to TCU era medically appropriate.

## 2017-01-30 NOTE — BRIEF OP NOTE
[facility], [place]    Brief Operative Note    Pre-operative diagnosis: Necrotizing Pancreatitis   Post-operative diagnosis * No post-op diagnosis entered *  Procedure: Procedure(s):  Endoscopic Ultrasound With Cyst Gastrostomy and balloon dilation of the cyst Gastrostomy with Two stent Placement(vented) - Wound Class: II-Clean Contaminated  Surgeon: Surgeon(s) and Role:     * Jus Montana MD - Primary     * Melanie Shankar MD - Assisting  Anesthesia: General   Estimated blood loss: Minimal  Drains: None  Specimens:   ID Type Source Tests Collected by Time Destination   1 : Necrotic collection from Pancreas Fluid Other ANAEROBIC BACTERIAL CULTURE, FLUID CULTURE AEROBIC BACTERIAL Jus Montana MD 1/30/2017  2:53 PM      Findings:   Two complex cavities noted adjacent to pancreatic body and tail. Distal cavity had a pulsating mass and was avoided. Hot axios placed, dilated to 12. 10 FR x 1 cm DPT solus stent placed through it.     Complications: None.  Implants: None.

## 2017-01-31 ENCOUNTER — APPOINTMENT (OUTPATIENT)
Dept: PHYSICAL THERAPY | Facility: CLINIC | Age: 57
DRG: 405 | End: 2017-01-31
Attending: INTERNAL MEDICINE
Payer: MEDICARE

## 2017-01-31 ENCOUNTER — APPOINTMENT (OUTPATIENT)
Dept: OCCUPATIONAL THERAPY | Facility: CLINIC | Age: 57
DRG: 405 | End: 2017-01-31
Attending: INTERNAL MEDICINE
Payer: MEDICARE

## 2017-01-31 ENCOUNTER — APPOINTMENT (OUTPATIENT)
Dept: CT IMAGING | Facility: CLINIC | Age: 57
DRG: 405 | End: 2017-01-31
Attending: INTERNAL MEDICINE
Payer: MEDICARE

## 2017-01-31 LAB
ANION GAP SERPL CALCULATED.3IONS-SCNC: 7 MMOL/L (ref 3–14)
BUN SERPL-MCNC: 18 MG/DL (ref 7–30)
CALCIUM SERPL-MCNC: 6.6 MG/DL (ref 8.5–10.1)
CHLORIDE SERPL-SCNC: 106 MMOL/L (ref 94–109)
CO2 SERPL-SCNC: 24 MMOL/L (ref 20–32)
CREAT SERPL-MCNC: 0.63 MG/DL (ref 0.66–1.25)
ERYTHROCYTE [DISTWIDTH] IN BLOOD BY AUTOMATED COUNT: 19.7 % (ref 10–15)
GFR SERPL CREATININE-BSD FRML MDRD: ABNORMAL ML/MIN/1.7M2
GLUCOSE BLDC GLUCOMTR-MCNC: 109 MG/DL (ref 70–99)
GLUCOSE BLDC GLUCOMTR-MCNC: 113 MG/DL (ref 70–99)
GLUCOSE BLDC GLUCOMTR-MCNC: 119 MG/DL (ref 70–99)
GLUCOSE BLDC GLUCOMTR-MCNC: 121 MG/DL (ref 70–99)
GLUCOSE BLDC GLUCOMTR-MCNC: 152 MG/DL (ref 70–99)
GLUCOSE BLDC GLUCOMTR-MCNC: 91 MG/DL (ref 70–99)
GLUCOSE SERPL-MCNC: 142 MG/DL (ref 70–99)
HCT VFR BLD AUTO: 24.3 % (ref 40–53)
HGB BLD-MCNC: 7.4 G/DL (ref 13.3–17.7)
INTERPRETATION ECG - MUSE: NORMAL
MCH RBC QN AUTO: 24.3 PG (ref 26.5–33)
MCHC RBC AUTO-ENTMCNC: 30.5 G/DL (ref 31.5–36.5)
MCV RBC AUTO: 80 FL (ref 78–100)
PHOSPHATE SERPL-MCNC: 2.3 MG/DL (ref 2.5–4.5)
PLATELET # BLD AUTO: 167 10E9/L (ref 150–450)
POTASSIUM SERPL-SCNC: 3.8 MMOL/L (ref 3.4–5.3)
RBC # BLD AUTO: 3.05 10E12/L (ref 4.4–5.9)
SODIUM SERPL-SCNC: 136 MMOL/L (ref 133–144)
WBC # BLD AUTO: 8.1 10E9/L (ref 4–11)

## 2017-01-31 PROCEDURE — 80048 BASIC METABOLIC PNL TOTAL CA: CPT | Performed by: INTERNAL MEDICINE

## 2017-01-31 PROCEDURE — 25000128 H RX IP 250 OP 636: Performed by: NURSE PRACTITIONER

## 2017-01-31 PROCEDURE — 40000193 ZZH STATISTIC PT WARD VISIT: Performed by: PHYSICAL THERAPIST

## 2017-01-31 PROCEDURE — 85027 COMPLETE CBC AUTOMATED: CPT | Performed by: INTERNAL MEDICINE

## 2017-01-31 PROCEDURE — A9270 NON-COVERED ITEM OR SERVICE: HCPCS | Mod: GY

## 2017-01-31 PROCEDURE — 25000125 ZZHC RX 250: Performed by: INTERNAL MEDICINE

## 2017-01-31 PROCEDURE — 97110 THERAPEUTIC EXERCISES: CPT | Mod: GP | Performed by: PHYSICAL THERAPIST

## 2017-01-31 PROCEDURE — 99233 SBSQ HOSP IP/OBS HIGH 50: CPT | Performed by: NURSE PRACTITIONER

## 2017-01-31 PROCEDURE — 40000281 ZZH STATISTIC TRANSPORT TIME EA 15 MIN

## 2017-01-31 PROCEDURE — 25000132 ZZH RX MED GY IP 250 OP 250 PS 637: Mod: GY | Performed by: SURGERY

## 2017-01-31 PROCEDURE — A9270 NON-COVERED ITEM OR SERVICE: HCPCS | Mod: GY | Performed by: NURSE PRACTITIONER

## 2017-01-31 PROCEDURE — 25000132 ZZH RX MED GY IP 250 OP 250 PS 637: Mod: GY | Performed by: PHYSICIAN ASSISTANT

## 2017-01-31 PROCEDURE — 25000132 ZZH RX MED GY IP 250 OP 250 PS 637: Mod: GY | Performed by: NURSE PRACTITIONER

## 2017-01-31 PROCEDURE — A9270 NON-COVERED ITEM OR SERVICE: HCPCS | Mod: GY | Performed by: PHYSICIAN ASSISTANT

## 2017-01-31 PROCEDURE — 84100 ASSAY OF PHOSPHORUS: CPT | Performed by: INTERNAL MEDICINE

## 2017-01-31 PROCEDURE — 94003 VENT MGMT INPAT SUBQ DAY: CPT

## 2017-01-31 PROCEDURE — 97530 THERAPEUTIC ACTIVITIES: CPT | Mod: GO | Performed by: OCCUPATIONAL THERAPIST

## 2017-01-31 PROCEDURE — 94681 O2 UPTK CO2 OUTP % O2 XTRC: CPT

## 2017-01-31 PROCEDURE — 25000125 ZZHC RX 250: Performed by: SURGERY

## 2017-01-31 PROCEDURE — A9270 NON-COVERED ITEM OR SERVICE: HCPCS | Mod: GY | Performed by: SURGERY

## 2017-01-31 PROCEDURE — 40000275 ZZH STATISTIC RCP TIME EA 10 MIN

## 2017-01-31 PROCEDURE — 25000125 ZZHC RX 250

## 2017-01-31 PROCEDURE — 25000132 ZZH RX MED GY IP 250 OP 250 PS 637: Mod: GY

## 2017-01-31 PROCEDURE — 74174 CTA ABD&PLVS W/CONTRAST: CPT

## 2017-01-31 PROCEDURE — 27210436 ZZH NUTRITION PRODUCT SEMIELEM INTERMED CAN

## 2017-01-31 PROCEDURE — 25500064 ZZH RX 255 OP 636: Performed by: RADIOLOGY

## 2017-01-31 PROCEDURE — 25000125 ZZHC RX 250: Performed by: RADIOLOGY

## 2017-01-31 PROCEDURE — 20000004 ZZH R&B ICU UMMC

## 2017-01-31 PROCEDURE — 00000146 ZZHCL STATISTIC GLUCOSE BY METER IP

## 2017-01-31 PROCEDURE — 97530 THERAPEUTIC ACTIVITIES: CPT | Mod: GP | Performed by: PHYSICAL THERAPIST

## 2017-01-31 PROCEDURE — 40000133 ZZH STATISTIC OT WARD VISIT: Performed by: OCCUPATIONAL THERAPIST

## 2017-01-31 RX ORDER — IOPAMIDOL 755 MG/ML
100 INJECTION, SOLUTION INTRAVASCULAR ONCE
Status: COMPLETED | OUTPATIENT
Start: 2017-01-31 | End: 2017-01-31

## 2017-01-31 RX ORDER — GUAR GUM
1 PACKET (EA) ORAL EVERY 4 HOURS
Status: DISCONTINUED | OUTPATIENT
Start: 2017-01-31 | End: 2017-02-02

## 2017-01-31 RX ADMIN — POTASSIUM PHOSPHATE, MONOBASIC AND POTASSIUM PHOSPHATE, DIBASIC 15 MMOL: 224; 236 INJECTION, SOLUTION INTRAVENOUS at 13:41

## 2017-01-31 RX ADMIN — SODIUM BICARBONATE 325 MG: 325 TABLET ORAL at 12:24

## 2017-01-31 RX ADMIN — SODIUM CHLORIDE, PRESERVATIVE FREE 100 ML: 5 INJECTION INTRAVENOUS at 15:38

## 2017-01-31 RX ADMIN — PANCRELIPASE 48000 UNITS: 24000; 76000; 120000 CAPSULE, DELAYED RELEASE PELLETS ORAL at 12:23

## 2017-01-31 RX ADMIN — Medication 1 PACKET: at 12:19

## 2017-01-31 RX ADMIN — PANCRELIPASE 48000 UNITS: 24000; 76000; 120000 CAPSULE, DELAYED RELEASE PELLETS ORAL at 15:11

## 2017-01-31 RX ADMIN — QUETIAPINE FUMARATE 50 MG: 50 TABLET, FILM COATED ORAL at 00:52

## 2017-01-31 RX ADMIN — SODIUM BICARBONATE 325 MG: 325 TABLET ORAL at 00:47

## 2017-01-31 RX ADMIN — Medication 20 MG: at 10:13

## 2017-01-31 RX ADMIN — PANCRELIPASE 48000 UNITS: 24000; 76000; 120000 CAPSULE, DELAYED RELEASE PELLETS ORAL at 04:16

## 2017-01-31 RX ADMIN — LINEZOLID 600 MG: 100 SUSPENSION ORAL at 20:34

## 2017-01-31 RX ADMIN — Medication 40 MG: at 20:35

## 2017-01-31 RX ADMIN — Medication 150 MCG: at 08:26

## 2017-01-31 RX ADMIN — Medication 15 ML: at 08:09

## 2017-01-31 RX ADMIN — SODIUM BICARBONATE 325 MG: 325 TABLET ORAL at 17:48

## 2017-01-31 RX ADMIN — BRIVARACETAM 50 MG: 50 INJECTION, SUSPENSION INTRAVENOUS at 08:09

## 2017-01-31 RX ADMIN — CALCIUM CARBONATE 1250 MG: 1250 SUSPENSION ORAL at 08:11

## 2017-01-31 RX ADMIN — CARBAMAZEPINE 200 MG: 100 SUSPENSION ORAL at 20:34

## 2017-01-31 RX ADMIN — SODIUM BICARBONATE 325 MG: 325 TABLET ORAL at 04:16

## 2017-01-31 RX ADMIN — ERTAPENEM SODIUM 1 G: 1 INJECTION, POWDER, LYOPHILIZED, FOR SOLUTION INTRAMUSCULAR; INTRAVENOUS at 17:50

## 2017-01-31 RX ADMIN — PANCRELIPASE 48000 UNITS: 24000; 76000; 120000 CAPSULE, DELAYED RELEASE PELLETS ORAL at 00:47

## 2017-01-31 RX ADMIN — Medication 40 MG: at 08:11

## 2017-01-31 RX ADMIN — Medication 6 MG: at 22:22

## 2017-01-31 RX ADMIN — Medication 81 MG: at 08:26

## 2017-01-31 RX ADMIN — Medication 1 PACKET: at 13:40

## 2017-01-31 RX ADMIN — BRIVARACETAM 50 MG: 50 INJECTION, SUSPENSION INTRAVENOUS at 20:49

## 2017-01-31 RX ADMIN — IOPAMIDOL 100 ML: 755 INJECTION, SOLUTION INTRAVENOUS at 15:37

## 2017-01-31 RX ADMIN — LINEZOLID 600 MG: 100 SUSPENSION ORAL at 08:10

## 2017-01-31 RX ADMIN — Medication 1 PACKET: at 22:22

## 2017-01-31 RX ADMIN — CARBAMAZEPINE 200 MG: 100 SUSPENSION ORAL at 15:10

## 2017-01-31 RX ADMIN — POTASSIUM PHOSPHATE, MONOBASIC AND POTASSIUM PHOSPHATE, DIBASIC 15 MMOL: 224; 236 INJECTION, SOLUTION INTRAVENOUS at 22:26

## 2017-01-31 RX ADMIN — Medication 1 PACKET: at 17:49

## 2017-01-31 RX ADMIN — INSULIN ASPART 1 UNITS: 100 INJECTION, SOLUTION INTRAVENOUS; SUBCUTANEOUS at 04:19

## 2017-01-31 RX ADMIN — CALCIUM CARBONATE 1250 MG: 1250 SUSPENSION ORAL at 17:49

## 2017-01-31 RX ADMIN — DESMOPRESSIN ACETATE 1 MCG: 4 SOLUTION INTRAVENOUS at 20:49

## 2017-01-31 RX ADMIN — SODIUM BICARBONATE 325 MG: 325 TABLET ORAL at 08:09

## 2017-01-31 RX ADMIN — POTASSIUM CHLORIDE 20 MEQ: 29.8 INJECTION, SOLUTION INTRAVENOUS at 06:13

## 2017-01-31 RX ADMIN — SODIUM CHLORIDE, PRESERVATIVE FREE 5 ML: 5 INJECTION INTRAVENOUS at 17:48

## 2017-01-31 RX ADMIN — HYDROCORTISONE SODIUM SUCCINATE 50 MG: 100 INJECTION, POWDER, FOR SOLUTION INTRAMUSCULAR; INTRAVENOUS at 06:16

## 2017-01-31 RX ADMIN — CARBAMAZEPINE 200 MG: 100 SUSPENSION ORAL at 08:27

## 2017-01-31 RX ADMIN — POTASSIUM PHOSPHATE, MONOBASIC AND POTASSIUM PHOSPHATE, DIBASIC 15 MMOL: 224; 236 INJECTION, SOLUTION INTRAVENOUS at 06:25

## 2017-01-31 RX ADMIN — Medication 25 MG: at 22:53

## 2017-01-31 RX ADMIN — PANCRELIPASE 48000 UNITS: 24000; 76000; 120000 CAPSULE, DELAYED RELEASE PELLETS ORAL at 08:10

## 2017-01-31 RX ADMIN — CALCIUM CARBONATE 1250 MG: 1250 SUSPENSION ORAL at 12:19

## 2017-01-31 RX ADMIN — DESMOPRESSIN ACETATE 1 MCG: 4 SOLUTION INTRAVENOUS at 08:09

## 2017-01-31 RX ADMIN — CARBAMAZEPINE 200 MG: 100 SUSPENSION ORAL at 12:20

## 2017-01-31 RX ADMIN — SODIUM BICARBONATE 325 MG: 325 TABLET ORAL at 20:23

## 2017-01-31 RX ADMIN — PANCRELIPASE 48000 UNITS: 24000; 76000; 120000 CAPSULE, DELAYED RELEASE PELLETS ORAL at 20:23

## 2017-01-31 NOTE — PLAN OF CARE
"Problem: Goal Outcome Summary  Goal: Goal Outcome Summary  D/I= 55 yo male w/ pancreatitus had ERCP yesterday and 2 stents were placed. Skilled nursing assessments completed and medications adm as ordered. A/P= Denies pain. Suctioned infrequently for white secretions. HR sometimes bradys down to 40's and this is not new for pt. Leaks around rectal tube and has large amt green seedy loose stool out rectal tube . Continues cares. F/u noe; \"pulsating mass\" seen yesterday during surgery.        "

## 2017-01-31 NOTE — PROGRESS NOTES
SURGICAL ICU PROGRESS NOTE  January 31, 2017      CO-MORBIDITIES:   TBI  Seizure disorder  Hemiplegia  Panhypopituitarism  Chronic tracheostomy    ASSESSMENT: 56yM with TBI resulting in seizure disorder,spastic hemiplegia, and aphasia, v-fib cardiac arrest, panhypopituitarism and chronic trach who presents as a transfer for necrotizing pancreatitis. Fluid collection at the tail of the pancreas was aspirated and grew enterobacter. Patient's home desmopressin was inadvertently not restarted and developed hypernatremia. Now resolved and stable in the low 140s. Minimal pain although taut abd today, afebrile. Leukocytosis has resolved. PPD#1 s/p cystgastrostomy and doing well    PLAN:   Neuro/ pain/ sedation:  - Monitor neurological status. Notify the MD for any acute changes in exam.  - Anti-seizure: Carbamazepine and brivaracetam  - Panhypopituitarism: synthroid, testosterone, desmopression IV  - Seroquel for sedation  - Melatonin for sleep- adjust timing today     Pulmonary care:   - Wean FiO2 to keep saturation above 90%.  - Will work toward wean to trach dome  - Will pressure support again this AM once in chair     Cardiovascular:    - Monitor hemodynamic status.      GI care:   - NPO.  - Tube feeds at goal  - Meds per J tube  - Appreciate GI recs. S/p cystgastrostomy  - CTA today to further evaluate pulsatile mass seen during cystgastrostomy     Fluids/ Electrolytes/ Nutrition:   - Hypernatremia resolved. Will discuss with endocrine regarding route of desmopressin (IV v PO).   - Check sodium levels daily.  - ICU electrolyte replacement protocol  - No indication for parenteral nutrition.  - Nutrition consulted. Appreciate recs    Renal/ Fluid Balance:     - Will monitor intake and output.  - Lerma in place     Endocrine:    - Appreciate endocrine recs  - Hydrocortisone 25mg BID now  (Changed from 20mg AM and 10mg PM)   - SSI      ID/ Antibiotics:  - Ertapenem and linezolid for pancreatic necrosis; stop date 2/3  -  Necrotic pancreatitis with fluid culture growing enterobacter and VRE     Heme:     - Hgb stable; no indication for treatment      Prophylaxis:    - Mechanical prophylaxis for DVT.  - Lovenox hold for 3 days total per GI. Will discuss regrading starting anticoagulation earlier  - PPI BID     MSK:     - PT and OT consulted. Appreciate recs.     Lines/ tubes/ drains:  - Trach, PIV x2, G-J tube, L PICC, aaron, rectal tube     Disposition:  - Surgical ICU.    Patient seen, findings and plan discussed with Dr. Montez.    Yoni Raphael, NP-C   Critical Care Nurse Practitioner  ====================================    TODAY'S PROGRESS:   SUBJECTIVE:   More somnolent today. Reports not sleeping overnight. Having BM per rectal tube and voiding to aaron. Did well after procedure. Abd is taut but denies pain.     OBJECTIVE:   1. VITAL SIGNS:   Temp:  [96.6  F (35.9  C)-98  F (36.7  C)] 96.6  F (35.9  C)  Heart Rate:  [51-91] 72  Resp:  [14-18] 14  BP: ()/(45-73) 115/73 mmHg  FiO2 (%):  [30 %] 30 %  SpO2:  [99 %-100 %] 99 %  Ventilation Mode: CMV/AC  FiO2 (%): 30 %  Rate Set (breaths/minute): 14 breaths/min  Tidal Volume Set (mL): 500 mL  PEEP (cm H2O): 5 cmH2O  Pressure Support (cm H2O): 10 cmH2O  Oxygen Concentration (%): 30 %  Resp: 14    2. INTAKE/ OUTPUT:   I/O last 3 completed shifts:  In: 2774 [I.V.:754; NG/GT:410; IV Piggyback:500]  Out: 1685 [Urine:1120; Emesis/NG output:460; Stool:100; Blood:5]    3. PHYSICAL EXAMINATION:   General: Awake and alert  HEENT: Trach in place  Neuro: Follows commands.  CV: RRR  Pulm: Mechanically ventilated on exam  Abd: Soft; minimally tender; non-distended; G-J tube in place; no peritoneal signs  Extremities: WWP    4. INVESTIGATIONS:   Arterial Blood Gases     Recent Labs  Lab 01/30/17  1437   PH Wrong specimen type. Communicated with OR18 at 1439 1/30/17 kh.CORRECTED ON 01/30 AT 1451: PREVIOUSLY REPORTED AS 7.40   PCO2 Wrong specimen type. Communicated with OR18 at 1439 1/30/17  .CORRECTED ON 01/30 AT 1451: PREVIOUSLY REPORTED AS 38   PO2 Wrong specimen type. Communicated with OR18 at [facility] 1/30/17 .CORRECTED ON 01/30 AT 1451: PREVIOUSLY REPORTED AS 49   HCO3 Wrong specimen type. Communicated with OR18 at [facility] 1/30/17 .CORRECTED ON 01/30 AT 1451: PREVIOUSLY REPORTED AS 24     Complete Blood Count     Recent Labs  Lab 01/31/17  0340 01/30/17  1437 01/30/17  1430 01/30/17  0340 01/29/17  0403 01/28/17  0355   WBC 8.1  --   --  10.7 13.3* 15.7*   HGB 7.4* Wrong specimen type. Communicated with OR18 at [facility] 1/30/17 .CORRECTED ON 01/30 AT 1451: PREVIOUSLY REPORTED AS 7.1 7.1* 7.6* 8.1* 9.6*     --   --  163 168 198     Basic Metabolic Panel    Recent Labs  Lab 01/31/17  0340 01/30/17  1437 01/30/17  1430 01/30/17  1013 01/30/17  0340  01/29/17  0403  01/28/17  0355    Wrong specimen type. Communicated with OR18 at [facility] 1/30/17 .CORRECTED ON 01/30 AT 1451: PREVIOUSLY REPORTED  135 140 141  < > 150*  < > 163*   POTASSIUM 3.8 Wrong specimen type. Communicated with OR18 at [facility] 1/30/17 .CORRECTED ON 01/30 AT 1451: PREVIOUSLY REPORTED AS 3.6 3.6  --  3.8  < > 3.3*  < > 3.6   CHLORIDE 106  --   --   --  108  --  116*  --  127*   CO2 24  --   --   --  22  --  24  --  25   BUN 18  --   --   --  18  --  19  --  22   CR 0.63*  --   --   --  0.74  --  0.78  --  0.88   * Wrong specimen type. Communicated with OR18 at [facility] 1/30/17 .CORRECTED ON 01/30 AT 1451: PREVIOUSLY REPORTED  136*  --  106*  --  103*  --  122*   < > = values in this interval not displayed.  Liver Function Tests    Recent Labs  Lab 01/30/17  0340 01/29/17  1255 01/25/17  0426   ALBUMIN  --  1.6*  --    INR 1.32*  --  1.49*     Coagulation Profile    Recent Labs  Lab 01/30/17  0340 01/25/17  0426   INR 1.32* 1.49*         5. RADIOLOGY:   Recent Results (from the past 24 hour(s))   XR Surgery FRANCISCA Fluoro L/T 5 Min w Stills    Narrative    This exam was marked as non-reportable because it will not  be read by a   radiologist or a [place] non-radiologist provider.                 =========================================

## 2017-01-31 NOTE — PROGRESS NOTES
[facility] Progress Note          Assessment and Plan:   55 yo male patient with remote history of TBI and subsequent panhypopituitarism admitted with necrotizing pancreatitis; has been on antibiotics and supportive treatment and plan for cystogastrostomy today. Consulted for panhypopituitarism management.       Plan:   Diabetes Insipidus:  Na stable now, ok to switch to PTA oral dose.  Noted MAR re: oral medication given so far; he got a dose of oral 22, 23 and no dose 24, 25, again one dose on 1/28.  He was taking at home desmopressin 0.1 mg (100 mcg) three times per day.  Please start desmopressin at the same dose.  If available in solution form that might be the preferred form; PEG tube.      Secondary adrenal insufficiency: continue current dose of hydrocortisone to 25 PO BID (instead of 20 and 10).     Secondary hypothyroidism: Continue Levothyroxine 150 mcg daily for now; dose increased from 137 mcg daily.  Recheck free T4 in two weeks.            Secondary hypogonadism: continue with PTA replacement dose.     Hyperglycemia in a setting of critical illness: stable.  Continue ISS.      Endocrinologist:  Dr. Adarsh Luu. Follow up as an outpatient with Endocrinology.          Interval History:   Met with patient. No new events per nurse.            Medications:       melatonin  6 mg Per J Tube QPM     fiber modular  1 packet Per Feeding Tube Q4H     hydrocortisone  25 mg Per J Tube BID     sodium chloride (PF)  3 mL Intracatheter Q8H     linezolid  600 mg Oral or J tube Q12H ALONDRA     carBAMazepine  200 mg Per J Tube 4x Daily     calcium carbonate  1,250 mg Per J Tube TID w/meals     aspirin  81 mg Per J Tube Daily     levothyroxine  150 mcg Per J Tube QAM AC     potassium phosphate  15 mmol Per J Tube Q8H ALONDRA     insulin aspart  1-6 Units Subcutaneous Q4H     desmopressin  1 mcg Intravenous BID     Brivaracetam  50 mg Intravenous BID     heparin lock flush  5-10 mL Intracatheter Q24H      "multivitamins with minerals  15 mL Per J Tube Daily     amylase-lipase-protease  1-2 capsule Per J Tube Q4H    And     sodium bicarbonate  325 mg Per J Tube Q4H     ertapenem (INVanz) IV  1 g Intravenous Q24H     bisacodyl  10 mg Rectal Daily     pantoprazole  40 mg Per Feeding Tube BID     testosterone cypionate  200 mg Intramuscular Weekly        Physical Examinations:  /75 mmHg  Temp(Src) 97  F (36.1  C) (Axillary)  Resp 16  Ht 1.651 m (5' 5\")  Wt 73 kg (160 lb 15 oz)  BMI 26.78 kg/m2  SpO2 99%  Body mass index is 26.78 kg/(m^2).     Neurological: alert, follows instructions and responds to questions by nodding his head.             Data:   Last Basic Metabolic Panel:  NA      136   1/31/2017   POTASSIUM      3.8   1/31/2017  CHLORIDE      106   1/31/2017  STEVE      6.6   1/31/2017  CO2       24   1/31/2017  BUN       18   1/31/2017  CR     0.63   1/31/2017  GLC      142   1/31/2017    Patient seen and discussed with Endocrinology attending Dr. Miller.      Faizan Mclean MD  Endocrinology Fellow /[phone]        Patient seen by me with fellow Dr Mclean.  Pt appears adequately replaced in terms of pituitary replacement - plan to transition to oral DDAVP replacement starting at home regimen.  Findings and plan as above.    Neville Miller MD   [phone]  "

## 2017-01-31 NOTE — PLAN OF CARE
Problem: Goal Outcome Summary  Goal: Goal Outcome Summary  OT 4AB: pt.  A X 2 with ceiling lift transf. Bed-chair, tolerated RUE PROM/streteches well.  VSS throughout session on vent 30% Fio2.  Recommend d/c to rehab.

## 2017-01-31 NOTE — PROGRESS NOTES
CLINICAL NUTRITION SERVICES - REASSESSMENT NOTE     Nutrition Prescription    RECOMMENDATIONS FOR MDs/PROVIDERS TO ORDER:  1.  If Phos remains <2.7 mg/dL, continue with enteral KPhos and re-order to check Phos lab checks until see values consistently WNL.  Consider d/c enteral KPhos only once Phos trending towards middle to higher ends of nrml range.    2.  If loose stools persist would discontinue scheduled Dulcolax order to avoid administration.    Malnutrition Status:     Non-severe malnutrition in the context of acute on chronic illness.    Recommendations already ordered by Registered Dietitian (RD):  1.  Ordered metabolic cart study this AM  (see results and interpretation below)    2.  Ordered add on Phos to AM labs and to repeat daily the next 3 days to evaluate ongoing approp of enteral KPhos supplementation.    3.  Ordered the following initial fiber supplementation with fiber-free TF formula: Nutrisource Fiber 1 pkt q 4 hrs (6 pkts/day = 18 gm soluble fiber daily)    Future/Additional Recommendations:  If remains on the vent with poor vent wean progress, order to recheck metabolic cart study.     EVALUATION OF THE PROGRESS TOWARD GOALS   -Diet: NPO    -Enteral access: GJ tube (converted to GJ on 12/2/16)    -EN: changed to new immune-modulating (with omega-3 FA)/peptide/MCT based TF regimen on 1/24 and advanced to goal on 1/27 (noted briefly held for Synthroid dose on 1/27), held on 1/29 due to vomiting and restarted @ 10 ml/hr until help again for OR on 1/30 and then restarted @ 60 ml/hr postop ; currently receiving Impact Peptide @ goal 60 ml/hr (1440 ml/day) to provide 2160 kcals (29 kcal/kg or 161% of MREE? - see below), 135 g PRO (1.8 g/kg/day), 1109 ml free H2O, 92 g Fat (50% from MCTs), 202 g CHO and no Fiber daily.      -Meds: Certavite, Na+ Bicarb with Creon 24 (2 capsules q 4 hrs = 3130 units of lipase/gram of fat in daily goal TF volume), oral KPhos solution (increased on 1/30 to 15 mmol q 8  hrs)    -Intakes & Tolerance: Received ave of 1036 ml TF daily the last 5 days providing ave of 1554 kcals (21 kcal/kg/day or 116% MREE per results below) and 97 g PRO (1.3 g/kg/day).   (1/25) and re-check 180 (1/30 pre-op and anticipate could be even higher given now postop status - see GI below) and would continue with anti-inflammatory TF profile until see improvements/do not anticipate further GI procedures.  BUN 18/Cr 0.6 mg/dL with average TF intakes and higher PRO provisions and would continue with minimally 1.5 g PRO/kg/day given hypercatabolism with necrotizing pancreatitis and muscle wasting sx.     NEW FINDINGS   --FEN: Phos 1.4 (1/29), 2.6 (1/30) and noted no lab recheck today.  Noted increase enteral KPhos supplementation yesterday.    --Resp & Estimated Needs: Remains on the vent via trach postop with FiO2 @ 30%.  Obtained metabolic cart study today (1/31/2017) @ ~09:00 with the following results: MREE = 1343 kcals/day (equiv to 19 kcal/kg/day) with RQ = 0.81.  Pt received 1020 ml of TF (70% of goal volume) in 24 hours preceding the study providing 1530 kcals (114 % MREE) and 96 g PRO (1.3 g/kg/day).  RQ is within a physiologic range; RQ somewhat logical given PRO intakes and energy intakes modestly > MREE prior to study.  However, given possible weight declines with necrotizing pancreatitis dx, MREE is significantly lower than would have expected and would continue to aim energy intakes 25-30 kcal/kg/day of new dosing wt (see below) while maintaining higher PRO intakes (minimally 1.5 g/kg/day) and re-check metabolic cart study if observe sx of overfeeding or poor vent wean progress observed postop.    --GI: Noted 1/30/17, S/p Cyst Gastrostomy and balloon dilation of the cyst Gastrostomy with Two stent Placement.  Stool outputs 400-700 ml daily the last 3 days.  Also noted currently prescribed scheduled Dulcolax (ordered since 1/24 but has not been given since ordered per MAR).  Observed to be  watery/loose/brown.    --Weight: 74 kg upon adm (1/24/16), declined to 71.5 kg on 1/26 and 73 kg on 1/27 (last documented value); potentially  With some weight loss but may also have component of scale variation as documented 2 kg loss since OSH adm wt of 74 kg (3% loss x <1 wk).  Will change to lowest documented wt this adm of 72 kg and will need to recheck weights to see if ongoing trend downward.    MALNUTRITION  % Intake: Decreased intake does not meet criteria  % Weight Loss: > 2% in 1 week (severe) - suspect some but may not be severe (at least non-severe)  Subcutaneous Fat Loss: Upper arm and Thoracic/intercostal:  Mild  Muscle Loss: Temporal, Scapular bone, Thoracic region (clavicle, acromium bone, deltoid, trapezius, pectoral), Upper arm (bicep, tricep), Upper leg (quadricep, hamstring), Patellar region and Posterior calf: all with at least Mild (note shoulder joint/squaring and leg/arms may be borderline Moderate)  Fluid Accumulation/Edema: None noted  Malnutrition Diagnosis: Non-severe malnutrition in the context of acute on chronic illness.    Previous Goals   1.  Tolerate adv of TF to goal infusion without sx of refeeding within the next 1-2 days.  Evaluation: Not met (took >2 days to adv to goal)  2.  Total ave nutrition intakes (EN) to provide minimum 25 kcal/kg/day and 1.5 g PRO/kg/day (per 74 kg).   Evaluation: Not met    Previous Nutrition Diagnosis  Inadequate protein-energy intake  Evaluation: Improving but ongoing.    CURRENT NUTRITION DIAGNOSIS  Inadequate protein-energy intake r/t only recently advanced to goal TF and interruptions to TF with surgical procedures and sx of intol (N/V) AEB total ave TF intakes the last 5 days providing ave of 1554 kcals (21 kcal/kg/day) and 97 g PRO (1.3 g/kg/day) meeting < RD goals for intake and possible with wt declines over the course of [facility] adm (~2 kg loss)    INTERVENTIONS  Implementation  Collaboration and Referral of Nutrition care -  Discussed plan for FEN/GI on rounds with MDs who agree to continue with same/current energy provisions despite initial MREE and begin/order fiber supplementation.  RN to repeat IV Phos supplementation pending Phos recheck results.      Goals  Total ave nutrition intakes (EN) to provide minimum 25 kcal/kg/day or 80% of valid MREE (as repeat results available) and 1.5 g PRO/kg/day (per 72 kg).     Monitoring/Evaluation  Progress toward goals will be monitored and evaluated per protocol.     Laine Will ,YONIS, LD, CNSC (pgr [phone])

## 2017-01-31 NOTE — PLAN OF CARE
Problem: Goal Outcome Summary  Goal: Goal Outcome Summary  PT/4AB: Patient seen by PT for progression of functional mobility and strength. Performed dependent transfer from bed<>chair. Rolled in bed to B sides with Mod A x 1 and use of bed rail. Lots of cuing to avoid pulling at lines/tubes. Performed LE strengthening and stretching exercises in bed x 10-12 reps. Tolerated session but fatigued at end. Recommended d/c to TCU once medically ready.

## 2017-01-31 NOTE — PROGRESS NOTES
"  GASTROENTEROLOGY PROGRESS NOTE    Date of Admission: 1/23/2017  Reason for Admission: necrotizing pancreatitis      Assessment:  56 year old male with a history of TBI with resultant seizures, aphasia, right sided hemiplegia, v-fib cardiac arrest, panhypopituitarism, and chronic tracheostomy who is transferred from [facility] where he was admitted 1/21 from his LTACH with increasing abd discomfort, fevers and resp distress and was found to have necrotizing pancreatitis based on CT findings of multiple locations of walled off necrosis. He did undergo CT guided aspiration of the largest fluid collection in the pancreatic tail 1/22 in which cultures grew enterobacter cloacae complex + VRE.     Now s/p EUS with cystgastrostomy placement (hot axios) and 10Fr x 1cm solus stent placed through it. Fluid sent for culture. Due to distal cavity showing pulsating mass/vessel recommend CT angiogram of abdomen for further evaluation    Recommendations:  CT abdomen angiogram today  Cont abx  Follow up aerobic/anaerobic cultures   Continue PERT   Continue PEG-J TF      The patient was discussed and plan agreed upon with GI staff, Dr Marcus.      Meliza Lofton PA-C   Therapeutic Endoscopy/Pancreaticobiliary RADHA  [facility]  Pager *[phone]  _______________________________________________________________      Objective:  Blood pressure 115/73, temperature 96.6  F (35.9  C), temperature source Axillary, resp. rate 14, height 1.651 m (5' 5\"), weight 73 kg (160 lb 15 oz), SpO2 99 %.  Patient not seen         PROCEDURES:  EUS 1/30/2017 - Dr. Montana   - Focused exam during which two large complex walled collections were demonstrated in the expected regions of the pancreatic neck, body and tail, possibly communicating with one another; the downstream (more medial) collection was avoided due to a large pulsating   vessel seen within the collection and layering solid content suggesting " recent bleeding. The upstream collection was then approached, and cystogastrostomy created by hot technique and secured by Axios and Solus stent placement. Approximately 1L of infected solid and liquid contect was removed (a small amount of which was sent for cultures).         - Well positioned GJ not manipulated     LABS:  BMP  Recent Labs  Lab 01/31/17  0340 01/30/17  1437 01/30/17  1430 01/30/17  1013 01/30/17  0340  01/29/17  0403  01/28/17  0355    Wrong specimen type. Communicated with OR18 at 1439 1/30/17 .CORRECTED ON 01/30 AT 1451: PREVIOUSLY REPORTED  135 140 141  < > 150*  < > 163*   POTASSIUM 3.8 Wrong specimen type. Communicated with OR18 at [facility]9 1/30/17 .CORRECTED ON 01/30 AT 1451: PREVIOUSLY REPORTED AS 3.6 3.6  --  3.8  < > 3.3*  < > 3.6   CHLORIDE 106  --   --   --  108  --  116*  --  127*   STEVE 6.6*  --   --   --  6.3*  --  6.6*  --  7.3*   CO2 24  --   --   --  22  --  24  --  25   BUN 18  --   --   --  18  --  19  --  22   CR 0.63*  --   --   --  0.74  --  0.78  --  0.88   * Wrong specimen type. Communicated with OR18 at [facility] 1/30/17 .CORRECTED ON 01/30 AT 1451: PREVIOUSLY REPORTED  136*  --  106*  --  103*  --  122*   < > = values in this interval not displayed.  CBC  Recent Labs  Lab 01/31/17  0340  01/30/17  0340 01/29/17  0403 01/28/17  0355   WBC 8.1  --  10.7 13.3* 15.7*   RBC 3.05*  --  3.18* 3.31* 3.96*   HGB 7.4*  < > 7.6* 8.1* 9.6*   HCT 24.3*  --  25.6* 27.0* 32.4*   MCV 80  --  81 82 82   MCH 24.3*  --  23.9* 24.5* 24.2*   MCHC 30.5*  --  29.7* 30.0* 29.6*   RDW 19.7*  --  19.8* 20.0* 19.8*     --  163 168 198   < > = values in this interval not displayed.  INR  Recent Labs  Lab 01/30/17  0340 01/25/17  0426   INR 1.32* 1.49*     LFTs  Recent Labs  Lab 01/29/17  1255   ALBUMIN 1.6*        IMAGING:  Reviewed

## 2017-02-01 ENCOUNTER — APPOINTMENT (OUTPATIENT)
Dept: PHYSICAL THERAPY | Facility: CLINIC | Age: 57
DRG: 405 | End: 2017-02-01
Attending: INTERNAL MEDICINE
Payer: MEDICARE

## 2017-02-01 ENCOUNTER — APPOINTMENT (OUTPATIENT)
Dept: OCCUPATIONAL THERAPY | Facility: CLINIC | Age: 57
DRG: 405 | End: 2017-02-01
Attending: INTERNAL MEDICINE
Payer: MEDICARE

## 2017-02-01 LAB
ANION GAP SERPL CALCULATED.3IONS-SCNC: 9 MMOL/L (ref 3–14)
BUN SERPL-MCNC: 20 MG/DL (ref 7–30)
CALCIUM SERPL-MCNC: 6.6 MG/DL (ref 8.5–10.1)
CHLORIDE SERPL-SCNC: 108 MMOL/L (ref 94–109)
CO2 SERPL-SCNC: 24 MMOL/L (ref 20–32)
CREAT SERPL-MCNC: 0.66 MG/DL (ref 0.66–1.25)
ERYTHROCYTE [DISTWIDTH] IN BLOOD BY AUTOMATED COUNT: 19.7 % (ref 10–15)
GFR SERPL CREATININE-BSD FRML MDRD: ABNORMAL ML/MIN/1.7M2
GLUCOSE BLDC GLUCOMTR-MCNC: 103 MG/DL (ref 70–99)
GLUCOSE BLDC GLUCOMTR-MCNC: 107 MG/DL (ref 70–99)
GLUCOSE BLDC GLUCOMTR-MCNC: 111 MG/DL (ref 70–99)
GLUCOSE BLDC GLUCOMTR-MCNC: 117 MG/DL (ref 70–99)
GLUCOSE BLDC GLUCOMTR-MCNC: 85 MG/DL (ref 70–99)
GLUCOSE BLDC GLUCOMTR-MCNC: 92 MG/DL (ref 70–99)
GLUCOSE SERPL-MCNC: 121 MG/DL (ref 70–99)
HCT VFR BLD AUTO: 26 % (ref 40–53)
HGB BLD-MCNC: 7.9 G/DL (ref 13.3–17.7)
LACTATE BLD-SCNC: 1.9 MMOL/L (ref 0.7–2.1)
MCH RBC QN AUTO: 24.8 PG (ref 26.5–33)
MCHC RBC AUTO-ENTMCNC: 30.4 G/DL (ref 31.5–36.5)
MCV RBC AUTO: 82 FL (ref 78–100)
PHOSPHATE SERPL-MCNC: 1.7 MG/DL (ref 2.5–4.5)
PLATELET # BLD AUTO: 249 10E9/L (ref 150–450)
POTASSIUM SERPL-SCNC: 3.6 MMOL/L (ref 3.4–5.3)
RBC # BLD AUTO: 3.19 10E12/L (ref 4.4–5.9)
SODIUM SERPL-SCNC: 141 MMOL/L (ref 133–144)
WBC # BLD AUTO: 11.2 10E9/L (ref 4–11)

## 2017-02-01 PROCEDURE — 25000132 ZZH RX MED GY IP 250 OP 250 PS 637: Mod: GY | Performed by: SURGERY

## 2017-02-01 PROCEDURE — 20000004 ZZH R&B ICU UMMC

## 2017-02-01 PROCEDURE — 94003 VENT MGMT INPAT SUBQ DAY: CPT

## 2017-02-01 PROCEDURE — 25000132 ZZH RX MED GY IP 250 OP 250 PS 637: Mod: GY | Performed by: PHYSICIAN ASSISTANT

## 2017-02-01 PROCEDURE — 00000146 ZZHCL STATISTIC GLUCOSE BY METER IP

## 2017-02-01 PROCEDURE — A9270 NON-COVERED ITEM OR SERVICE: HCPCS | Mod: GY | Performed by: NURSE PRACTITIONER

## 2017-02-01 PROCEDURE — 40000133 ZZH STATISTIC OT WARD VISIT: Performed by: OCCUPATIONAL THERAPIST

## 2017-02-01 PROCEDURE — 97110 THERAPEUTIC EXERCISES: CPT | Mod: GO | Performed by: OCCUPATIONAL THERAPIST

## 2017-02-01 PROCEDURE — 85027 COMPLETE CBC AUTOMATED: CPT | Performed by: INTERNAL MEDICINE

## 2017-02-01 PROCEDURE — 25000132 ZZH RX MED GY IP 250 OP 250 PS 637: Mod: GY | Performed by: NURSE PRACTITIONER

## 2017-02-01 PROCEDURE — A9270 NON-COVERED ITEM OR SERVICE: HCPCS | Mod: GY | Performed by: SURGERY

## 2017-02-01 PROCEDURE — A9270 NON-COVERED ITEM OR SERVICE: HCPCS | Mod: GY | Performed by: PHYSICIAN ASSISTANT

## 2017-02-01 PROCEDURE — A9270 NON-COVERED ITEM OR SERVICE: HCPCS | Mod: GY

## 2017-02-01 PROCEDURE — 25000125 ZZHC RX 250: Performed by: SURGERY

## 2017-02-01 PROCEDURE — 25000132 ZZH RX MED GY IP 250 OP 250 PS 637: Mod: GY

## 2017-02-01 PROCEDURE — 99233 SBSQ HOSP IP/OBS HIGH 50: CPT | Performed by: NURSE PRACTITIONER

## 2017-02-01 PROCEDURE — 27210436 ZZH NUTRITION PRODUCT SEMIELEM INTERMED CAN

## 2017-02-01 PROCEDURE — 40000275 ZZH STATISTIC RCP TIME EA 10 MIN

## 2017-02-01 PROCEDURE — 40000193 ZZH STATISTIC PT WARD VISIT: Performed by: PHYSICAL THERAPIST

## 2017-02-01 PROCEDURE — 83605 ASSAY OF LACTIC ACID: CPT | Performed by: NURSE PRACTITIONER

## 2017-02-01 PROCEDURE — 97110 THERAPEUTIC EXERCISES: CPT | Mod: GP | Performed by: PHYSICAL THERAPIST

## 2017-02-01 PROCEDURE — 97530 THERAPEUTIC ACTIVITIES: CPT | Mod: GP | Performed by: PHYSICAL THERAPIST

## 2017-02-01 PROCEDURE — 25000125 ZZHC RX 250: Performed by: NURSE PRACTITIONER

## 2017-02-01 PROCEDURE — 25000125 ZZHC RX 250

## 2017-02-01 PROCEDURE — 84100 ASSAY OF PHOSPHORUS: CPT | Performed by: INTERNAL MEDICINE

## 2017-02-01 PROCEDURE — 25000128 H RX IP 250 OP 636: Performed by: SURGERY

## 2017-02-01 PROCEDURE — 80048 BASIC METABOLIC PNL TOTAL CA: CPT | Performed by: INTERNAL MEDICINE

## 2017-02-01 RX ORDER — DESMOPRESSIN ACETATE 0.1 MG/1
100 TABLET ORAL EVERY 8 HOURS
Status: DISCONTINUED | OUTPATIENT
Start: 2017-02-01 | End: 2017-02-10 | Stop reason: HOSPADM

## 2017-02-01 RX ADMIN — POTASSIUM PHOSPHATE, MONOBASIC AND POTASSIUM PHOSPHATE, DIBASIC 18 MMOL: 224; 236 INJECTION, SOLUTION INTRAVENOUS at 17:35

## 2017-02-01 RX ADMIN — Medication 100 MCG: at 12:06

## 2017-02-01 RX ADMIN — HYDROMORPHONE HYDROCHLORIDE 0.5 MG: 10 INJECTION, SOLUTION INTRAMUSCULAR; INTRAVENOUS; SUBCUTANEOUS at 18:13

## 2017-02-01 RX ADMIN — Medication 6 MG: at 20:45

## 2017-02-01 RX ADMIN — BRIVARACETAM 50 MG: 50 INJECTION, SUSPENSION INTRAVENOUS at 22:43

## 2017-02-01 RX ADMIN — Medication 25 MG: at 20:45

## 2017-02-01 RX ADMIN — QUETIAPINE FUMARATE 50 MG: 50 TABLET, FILM COATED ORAL at 03:05

## 2017-02-01 RX ADMIN — SODIUM CHLORIDE, PRESERVATIVE FREE 5 ML: 5 INJECTION INTRAVENOUS at 17:36

## 2017-02-01 RX ADMIN — OXYCODONE HYDROCHLORIDE 10 MG: 5 SOLUTION ORAL at 22:12

## 2017-02-01 RX ADMIN — POTASSIUM CHLORIDE 20 MEQ: 1.5 POWDER, FOR SOLUTION ORAL at 05:57

## 2017-02-01 RX ADMIN — CARBAMAZEPINE 200 MG: 100 SUSPENSION ORAL at 08:11

## 2017-02-01 RX ADMIN — POTASSIUM PHOSPHATE, MONOBASIC AND POTASSIUM PHOSPHATE, DIBASIC 18 MMOL: 224; 236 INJECTION, SOLUTION INTRAVENOUS at 22:10

## 2017-02-01 RX ADMIN — Medication 1 PACKET: at 15:04

## 2017-02-01 RX ADMIN — LINEZOLID 600 MG: 100 SUSPENSION ORAL at 20:39

## 2017-02-01 RX ADMIN — PANCRELIPASE 48000 UNITS: 24000; 76000; 120000 CAPSULE, DELAYED RELEASE PELLETS ORAL at 15:05

## 2017-02-01 RX ADMIN — CARBAMAZEPINE 200 MG: 100 SUSPENSION ORAL at 12:07

## 2017-02-01 RX ADMIN — PANCRELIPASE 48000 UNITS: 24000; 76000; 120000 CAPSULE, DELAYED RELEASE PELLETS ORAL at 04:11

## 2017-02-01 RX ADMIN — Medication 40 MG: at 20:40

## 2017-02-01 RX ADMIN — Medication 1 PACKET: at 22:08

## 2017-02-01 RX ADMIN — Medication 1 PACKET: at 02:59

## 2017-02-01 RX ADMIN — PANCRELIPASE 48000 UNITS: 24000; 76000; 120000 CAPSULE, DELAYED RELEASE PELLETS ORAL at 12:06

## 2017-02-01 RX ADMIN — PANCRELIPASE 48000 UNITS: 24000; 76000; 120000 CAPSULE, DELAYED RELEASE PELLETS ORAL at 00:11

## 2017-02-01 RX ADMIN — Medication 1 PACKET: at 17:35

## 2017-02-01 RX ADMIN — BRIVARACETAM 50 MG: 50 INJECTION, SUSPENSION INTRAVENOUS at 08:10

## 2017-02-01 RX ADMIN — Medication 25 MG: at 08:12

## 2017-02-01 RX ADMIN — SODIUM BICARBONATE 325 MG: 325 TABLET ORAL at 15:05

## 2017-02-01 RX ADMIN — SODIUM BICARBONATE 325 MG: 325 TABLET ORAL at 00:01

## 2017-02-01 RX ADMIN — Medication 15 ML: at 08:10

## 2017-02-01 RX ADMIN — CARBAMAZEPINE 200 MG: 100 SUSPENSION ORAL at 20:38

## 2017-02-01 RX ADMIN — Medication 150 MCG: at 08:11

## 2017-02-01 RX ADMIN — CARBAMAZEPINE 200 MG: 100 SUSPENSION ORAL at 15:04

## 2017-02-01 RX ADMIN — SODIUM BICARBONATE 325 MG: 325 TABLET ORAL at 20:37

## 2017-02-01 RX ADMIN — Medication 100 MCG: at 20:37

## 2017-02-01 RX ADMIN — LINEZOLID 600 MG: 100 SUSPENSION ORAL at 08:12

## 2017-02-01 RX ADMIN — Medication 40 MG: at 08:13

## 2017-02-01 RX ADMIN — CALCIUM CARBONATE 1250 MG: 1250 SUSPENSION ORAL at 08:12

## 2017-02-01 RX ADMIN — Medication 81 MG: at 08:13

## 2017-02-01 RX ADMIN — Medication 1 PACKET: at 06:00

## 2017-02-01 RX ADMIN — POTASSIUM PHOSPHATE, MONOBASIC AND POTASSIUM PHOSPHATE, DIBASIC 20 MMOL: 224; 236 INJECTION, SOLUTION INTRAVENOUS at 06:41

## 2017-02-01 RX ADMIN — MEROPENEM 2 G: 1 INJECTION, POWDER, FOR SOLUTION INTRAVENOUS at 18:13

## 2017-02-01 RX ADMIN — PANCRELIPASE 48000 UNITS: 24000; 76000; 120000 CAPSULE, DELAYED RELEASE PELLETS ORAL at 08:13

## 2017-02-01 RX ADMIN — SODIUM BICARBONATE 325 MG: 325 TABLET ORAL at 12:06

## 2017-02-01 RX ADMIN — POTASSIUM PHOSPHATE, MONOBASIC AND POTASSIUM PHOSPHATE, DIBASIC 15 MMOL: 224; 236 INJECTION, SOLUTION INTRAVENOUS at 06:02

## 2017-02-01 RX ADMIN — SODIUM BICARBONATE 325 MG: 325 TABLET ORAL at 08:10

## 2017-02-01 RX ADMIN — CALCIUM CARBONATE 1250 MG: 1250 SUSPENSION ORAL at 12:08

## 2017-02-01 RX ADMIN — ACETAMINOPHEN 650 MG: 160 SOLUTION ORAL at 03:05

## 2017-02-01 RX ADMIN — Medication 1 PACKET: at 12:10

## 2017-02-01 RX ADMIN — SODIUM BICARBONATE 325 MG: 325 TABLET ORAL at 04:11

## 2017-02-01 RX ADMIN — PANCRELIPASE 48000 UNITS: 24000; 76000; 120000 CAPSULE, DELAYED RELEASE PELLETS ORAL at 20:37

## 2017-02-01 RX ADMIN — CALCIUM CARBONATE 1250 MG: 1250 SUSPENSION ORAL at 17:37

## 2017-02-01 RX ADMIN — PROCHLORPERAZINE EDISYLATE 10 MG: 5 INJECTION INTRAMUSCULAR; INTRAVENOUS at 10:53

## 2017-02-01 NOTE — PROGRESS NOTES
"  GASTROENTEROLOGY PROGRESS NOTE    Date of Admission: 1/23/2017  Reason for Admission: necrotizing pancreatitis      Assessment:  56 year old male with a history of TBI with resultant seizures, aphasia, right sided hemiplegia, v-fib cardiac arrest, panhypopituitarism, and chronic tracheostomy who is transferred from [facility] where he was admitted 1/21 from his LTACH with increasing abd discomfort, fevers and resp distress and was found to have necrotizing pancreatitis based on CT findings of multiple locations of walled off necrosis. He did undergo CT guided aspiration of the largest fluid collection in the pancreatic tail 1/22 in which cultures grew enterobacter cloacae complex + VRE.     Now s/p EUS with cystgastrostomy placement (hot axios) and 10Fr x 1cm solus stent placed through it 1/30/2017. Fluid sent for culture -- prelim showing moderate growth pseudomonas aeruginosa, light growth Enterobacter cloacae complex, candida albicans and moderate growth enterococcus faecium (susceptibility testing in process)    Recommendations:  Plan for repeat EUS/necrosectomy early next week, timing TBD (sooner if shows s/s deterioration)  Cont abx and tailor towards culture sens  Continue PERT   Continue PEG-J TF      The patient was discussed and plan agreed upon with GI staff, Dr Marcus.      Meliza Lofton PA-C   Therapeutic Endoscopy/Pancreaticobiliary RADHA  [facility]  Pager *[phone]  _______________________________________________________________    Interim since last evaluated: Patient seen and examined at 1315. Denies abdominal pain (shakes head no). Remains afebrile, vitals stable.       Objective:  Blood pressure 98/59, temperature 97.8  F (36.6  C), temperature source Axillary, resp. rate 18, height 1.651 m (5' 5\"), weight 73 kg (160 lb 15 oz), SpO2 99 %.  General: NAD, cooperative  Abd: Soft, NT, ND         PROCEDURES:  EUS 1/30/2017 - Dr. Montana   - Focused exam " during which two large complex walled collections were demonstrated in the expected regions of the pancreatic neck, body and tail, possibly communicating with one another; the downstream (more medial) collection was avoided due to a large pulsating   vessel seen within the collection and layering solid content suggesting recent bleeding. The upstream collection was then approached, and cystogastrostomy created by hot technique and secured by Axios and Solus stent placement. Approximately 1L of infected solid and liquid contect was removed (a small amount of which was sent for cultures).         - Well positioned GJ not manipulated     LABS:  BMP  Recent Labs  Lab 02/01/17  0400 01/31/17  0340 01/30/17  1430  01/30/17  0340  01/29/17  0403    136 135  < > 141  < > 150*   POTASSIUM 3.6 3.8 3.6  --  3.8  < > 3.3*   CHLORIDE 108 106  --   --  108  --  116*   STEVE 6.6* 6.6*  --   --  6.3*  --  6.6*   CO2 24 24  --   --  22  --  24   BUN 20 18  --   --  18  --  19   CR 0.66 0.63*  --   --  0.74  --  0.78   * 142* 136*  --  106*  --  103*   < > = values in this interval not displayed.  CBC  Recent Labs  Lab 02/01/17  0400 01/31/17  0340 01/30/17  0340 01/29/17  0403   WBC 11.2* 8.1  --  10.7 13.3*   RBC 3.19* 3.05*  --  3.18* 3.31*   HGB 7.9* 7.4*  < > 7.6* 8.1*   HCT 26.0* 24.3*  --  25.6* 27.0*   MCV 82 80  --  81 82   MCH 24.8* 24.3*  --  23.9* 24.5*   MCHC 30.4* 30.5*  --  29.7* 30.0*   RDW 19.7* 19.7*  --  19.8* 20.0*    167  --  163 168   < > = values in this interval not displayed.  INR    Recent Labs  Lab 01/30/17  0340   INR 1.32*     LFTs    Recent Labs  Lab 01/29/17  1255   ALBUMIN 1.6*        IMAGING:  Reviewed

## 2017-02-01 NOTE — PLAN OF CARE
Problem: Goal Outcome Summary  Goal: Goal Outcome Summary    4A: Recommend discharge to TCU vs LTACH pending medical needs at discharge. Pt able to tolerate PROM/stretch to RUE for contracture management and resistance exercises to LUE for strengthening. Per RN, pt had already been up in chair per their agreement for the day. Discussed having family bring in pt's w/c for optimizing comfort and reducing pressure ulcers with OOB - RN has spoke with family regarding this issue.

## 2017-02-01 NOTE — PLAN OF CARE
Problem: Goal Outcome Summary  Goal: Goal Outcome Summary  Pt remains unchanged. Abdominal CT done today. Urine output is low. VSS. TF remains at goal. Lung sounds clear. Frequent suctioning. Continue to monitor, notify MD with any concerns.

## 2017-02-01 NOTE — PLAN OF CARE
Problem: Goal Outcome Summary  Goal: Goal Outcome Summary  PT 4A- pt seen for in bed LE exercise and stretching. Pt with spasticity in LE. Pt rolled with mod/max A of ! And positioned in universal sling. Pt was dependently lifted to chair and positioned for comfort. Pt will need TCU at discharge.

## 2017-02-01 NOTE — PROGRESS NOTES
SURGICAL ICU PROGRESS NOTE  February 1, 2017      CO-MORBIDITIES:   TBI  Seizure disorder  Hemiplegia  Panhypopituitarism  Chronic tracheostomy    ASSESSMENT: 56yM with TBI resulting in seizure disorder,spastic hemiplegia, and aphasia, v-fib cardiac arrest, panhypopituitarism and chronic trach who presents as a transfer for necrotizing pancreatitis. Fluid collection at the tail of the pancreas was aspirated and grew enterobacter. Patient's home desmopressin was inadvertently not restarted and developed hypernatremia. Now resolved and stable in the low 140s. Minimal pain although taut abd today, afebrile. Leukocytosis has resolved. PPD#2 s/p cystgastrostomy, doing well    PLAN:   Neuro/ pain/ sedation:  - Monitor neurological status. Notify the MD for any acute changes in exam.  - Anti-seizure: Carbamazepine and brivaracetam  - Panhypopituitarism: synthroid, testosterone, desmopression IV  - Seroquel for sedation  - Melatonin for sleep- adjust timing today      Pulmonary care:   - Wean FiO2 to keep saturation above 90%.  - Will work toward wean to trach dome  - PST BID  - Nebs     Cardiovascular:    - Monitor hemodynamic status.      GI care:   - NPO.  - Tube feeds at goal  - Meds per J tube  - Appreciate GI recs. S/p cystgastrostomy  - CTA negative for pulsatile mass  - G-tube gravity today      Fluids/ Electrolytes/ Nutrition:   - Hypernatremia resolved. Resume home DDAVP TID  - Check sodium levels daily.  - ICU electrolyte replacement protocol  - No indication for parenteral nutrition.  - Nutrition consulted. Appreciate recs    Renal/ Fluid Balance:     - Will monitor intake and output.  - Lerma in place  - Add oral phosphate for persistent hypophosphatemia      Endocrine:    - Appreciate endocrine recs  - Hydrocortisone 25mg BID now  (Changed from 20mg AM and 10mg PM)    - SSI   - DDAVP  mcg      ID/ Antibiotics:  - Ertapenem and linezolid for pancreatic necrosis; stop date 2/3  - Necrotic pancreatitis  with fluid culture growing enterobacter and VRE     Heme:     - Hgb stable; no indication for treatment      Prophylaxis:    - Mechanical prophylaxis for DVT.  - Lovenox hold for 3 days total per GI. Start tomorrow  - PPI BID     MSK:     - PT and OT consulted. Appreciate recs.     Lines/ tubes/ drains:  - Trach, PIV x2, G-J tube, L PICC, aaron, rectal tube     Disposition:  - Surgical ICU.  - Referral to [facility]     Patient seen, findings and plan discussed with Dr. Montez.    Yoni Raphael, NP-C    Critical Care Nurse Practitioner    ====================================    TODAY'S PROGRESS:   SUBJECTIVE:   No acute events note. No complaints per patient. Denies dyspnea, chest pain, palpitations, dizziness, vomiting.     OBJECTIVE:   1. VITAL SIGNS:   Temp:  [97.7  F (36.5  C)-98.5  F (36.9  C)] 97.8  F (36.6  C)  Heart Rate:  [] 70  Resp:  [14-22] 18  BP: ()/(47-85) 98/59 mmHg  FiO2 (%):  [30 %] 30 %  SpO2:  [99 %-100 %] 99 %  Ventilation Mode: CMV/AC  FiO2 (%): 30 %  Rate Set (breaths/minute): 14 breaths/min  Tidal Volume Set (mL): 500 mL  PEEP (cm H2O): 5 cmH2O  Pressure Support (cm H2O): 10 cmH2O  Oxygen Concentration (%): 30 %  Resp: 18    2. INTAKE/ OUTPUT:   I/O last 3 completed shifts:  In: 2210 [I.V.:170; NG/GT:660]  Out: 1849 [Urine:824; Emesis/NG output:375; Stool:650]    3. PHYSICAL EXAMINATION:   General: Awake and alert  HEENT: Trach in place  Neuro: Follows commands.  CV: RRR  Pulm: Mechanically ventilated on exam  Abd: Soft; minimally tender; non-distended; G-J tube in place; no peritoneal signs  Extremities: WWP    4. INVESTIGATIONS:   Arterial Blood Gases     Recent Labs  Lab 01/30/17  1437   PH Wrong specimen type. Communicated with OR18 at 1439 1/30/17 kh.CORRECTED ON 01/30 AT 1451: PREVIOUSLY REPORTED AS 7.40   PCO2 Wrong specimen type. Communicated with OR18 at 1439 1/30/17 kh.CORRECTED ON 01/30 AT 1451: PREVIOUSLY REPORTED AS 38   PO2 Wrong specimen type. Communicated with OR18 at  [facility] 1/30/17 .CORRECTED ON 01/30 AT 1451: PREVIOUSLY REPORTED AS 49   HCO3 Wrong specimen type. Communicated with OR18 at [facility] 1/30/17 .CORRECTED ON 01/30 AT 1451: PREVIOUSLY REPORTED AS 24     Complete Blood Count     Recent Labs  Lab 02/01/17  0400 01/31/17  0340 01/30/17  1437 01/30/17  1430 01/30/17  0340 01/29/17  0403   WBC 11.2* 8.1  --   --  10.7 13.3*   HGB 7.9* 7.4* Wrong specimen type. Communicated with OR18 at [facility] 1/30/17 .CORRECTED ON 01/30 AT 1451: PREVIOUSLY REPORTED AS 7.1 7.1* 7.6* 8.1*    167  --   --  163 168     Basic Metabolic Panel    Recent Labs  Lab 02/01/17  0400 01/31/17  0340 01/30/17  1437 01/30/17  1430  01/30/17  0340  01/29/17  0403    136 Wrong specimen type. Communicated with OR18 at [facility] 1/30/17 .CORRECTED ON 01/30 AT 1451: PREVIOUSLY REPORTED  135  < > 141  < > 150*   POTASSIUM 3.6 3.8 Wrong specimen type. Communicated with OR18 at [facility] 1/30/17 .CORRECTED ON 01/30 AT 1451: PREVIOUSLY REPORTED AS 3.6 3.6  --  3.8  < > 3.3*   CHLORIDE 108 106  --   --   --  108  --  116*   CO2 24 24  --   --   --  22  --  24   BUN 20 18  --   --   --  18  --  19   CR 0.66 0.63*  --   --   --  0.74  --  0.78   * 142* Wrong specimen type. Communicated with OR18 at [facility] 1/30/17 .CORRECTED ON 01/30 AT 1451: PREVIOUSLY REPORTED  136*  --  106*  --  103*   < > = values in this interval not displayed.  Liver Function Tests    Recent Labs  Lab 01/30/17  0340 01/29/17  1255   ALBUMIN  --  1.6*   INR 1.32*  --      Pancreatic Enzymes  No lab results found in last 7 days.  Coagulation Profile    Recent Labs  Lab 01/30/17  0340   INR 1.32*         5. RADIOLOGY:   Recent Results (from the past 24 hour(s))   CT Abdomen/Pelvis Angio wo & w Contrast    Narrative    CTA ANGIOGRAM ABDOMEN PELVIS, 1/31/2017 4:46 PM    TECHNIQUE:  Helical acquisition of CT images from the lung bases to  symphysis pubis were obtained with the use of IV contrast.  Three-dimensional (3D)  post-processed angiographic images were  reconstructed, archived to PACS and used in the interpretation of this  study.    COMPARISON: 1/25/2017    HISTORY: pulsatile mass within pancreatic fluid collection    FINDINGS:    Small left-sided pleural effusion. Micronodularity in the lung bases.  Consolidation in the posterior/medial lung bases.     The pancreatic parenchyma continues to be replaced by heterogeneous  necrosis. This is similar in appearance to 1/25/2017. Cystogastrostomy  tube from the necrotic collection in the pancreatic tail to the  stomach. This collection is smaller, now measuring 4.6 x 2.9 cm,  previously 6.1 x 4.6 cm. Collection in the pancreatic head is slightly  larger measuring 3.0 x 2.4 cm, previously 2.5 x 2.1 cm. Collection  adjacent the greater curvature of the stomach is not being drained,  measuring 7.3 x 4.1 cm compared to 6.6 x 3.5 cm.    Arterial phase imaging does not reveal any aneurysm or worrisome  arterial findings about the pancreas or adjacent the perigastric  collection. Normal branching pattern of the celiac axis. The proximal  celiac axis, SMA, bilateral renal arteries, and WILLIAM are patent.  Accessory superior right renal artery.     Percutaneous gastrojejunal jejunostomy tube with tip in the proximal  jejunum. Simple fluid within the right subphrenic space tracking along  the margins of the liver and down the right paracolic gutter and into  the pelvis, and within the left subphrenic space. The liver,  gallbladder, spleen, adrenal glands, and right kidney are  unremarkable. Tiny subcentimeter cortically based cyst within the left  kidney too small to adequately characterize. The catheter within a  decompressed bladder. Small amount of air within the bladder  consistent with catheterization. No focal bowel dilatation to suggest  obstruction. Scattered anasarca.    Nondisplaced left-sided 7th rib fracture.      Impression    IMPRESSION:   1. Necrotic pancreatitis replacing the  entire pancreatic parenchyma.  2. Decrease in size of collection in the pancreatic tail drained by  the cystogastrostomy tube. Collections in the pancreatic head and  along the greater curvature of the stomach are slightly larger. No  arterial abnormality or aneurysm is identified.  3. Continued reticulonodular opacities in the right base with  posterior medial consolidation worrisome for infection/aspiration.  4. Small left-sided pleural effusion.    I have personally reviewed the examination and initial interpretation  and I agree with the findings.    ALEJO THORPE MD       =========================================

## 2017-02-02 ENCOUNTER — APPOINTMENT (OUTPATIENT)
Dept: PHYSICAL THERAPY | Facility: CLINIC | Age: 57
DRG: 405 | End: 2017-02-02
Attending: INTERNAL MEDICINE
Payer: MEDICARE

## 2017-02-02 LAB
ANION GAP SERPL CALCULATED.3IONS-SCNC: 9 MMOL/L (ref 3–14)
BUN SERPL-MCNC: 17 MG/DL (ref 7–30)
CALCIUM SERPL-MCNC: 6.8 MG/DL (ref 8.5–10.1)
CHLORIDE SERPL-SCNC: 109 MMOL/L (ref 94–109)
CO2 SERPL-SCNC: 24 MMOL/L (ref 20–32)
CREAT SERPL-MCNC: 0.65 MG/DL (ref 0.66–1.25)
ERYTHROCYTE [DISTWIDTH] IN BLOOD BY AUTOMATED COUNT: 20.3 % (ref 10–15)
GFR SERPL CREATININE-BSD FRML MDRD: ABNORMAL ML/MIN/1.7M2
GLUCOSE BLDC GLUCOMTR-MCNC: 123 MG/DL (ref 70–99)
GLUCOSE BLDC GLUCOMTR-MCNC: 128 MG/DL (ref 70–99)
GLUCOSE BLDC GLUCOMTR-MCNC: 86 MG/DL (ref 70–99)
GLUCOSE SERPL-MCNC: 90 MG/DL (ref 70–99)
HCT VFR BLD AUTO: 28.2 % (ref 40–53)
HGB BLD-MCNC: 8.3 G/DL (ref 13.3–17.7)
MCH RBC QN AUTO: 23.9 PG (ref 26.5–33)
MCHC RBC AUTO-ENTMCNC: 29.4 G/DL (ref 31.5–36.5)
MCV RBC AUTO: 81 FL (ref 78–100)
PHOSPHATE SERPL-MCNC: 2 MG/DL (ref 2.5–4.5)
PLATELET # BLD AUTO: 344 10E9/L (ref 150–450)
POTASSIUM SERPL-SCNC: 4 MMOL/L (ref 3.4–5.3)
RBC # BLD AUTO: 3.47 10E12/L (ref 4.4–5.9)
SODIUM SERPL-SCNC: 142 MMOL/L (ref 133–144)
WBC # BLD AUTO: 12.4 10E9/L (ref 4–11)

## 2017-02-02 PROCEDURE — 25000125 ZZHC RX 250

## 2017-02-02 PROCEDURE — A9270 NON-COVERED ITEM OR SERVICE: HCPCS | Mod: GY | Performed by: PHYSICIAN ASSISTANT

## 2017-02-02 PROCEDURE — 25000132 ZZH RX MED GY IP 250 OP 250 PS 637: Mod: GY

## 2017-02-02 PROCEDURE — A9270 NON-COVERED ITEM OR SERVICE: HCPCS | Mod: GY | Performed by: NURSE PRACTITIONER

## 2017-02-02 PROCEDURE — 25000125 ZZHC RX 250: Performed by: NURSE PRACTITIONER

## 2017-02-02 PROCEDURE — 25000128 H RX IP 250 OP 636: Performed by: SURGERY

## 2017-02-02 PROCEDURE — 25000132 ZZH RX MED GY IP 250 OP 250 PS 637: Mod: GY | Performed by: NURSE PRACTITIONER

## 2017-02-02 PROCEDURE — 97530 THERAPEUTIC ACTIVITIES: CPT | Mod: GP

## 2017-02-02 PROCEDURE — 84100 ASSAY OF PHOSPHORUS: CPT | Performed by: INTERNAL MEDICINE

## 2017-02-02 PROCEDURE — 25000132 ZZH RX MED GY IP 250 OP 250 PS 637: Mod: GY | Performed by: PHYSICIAN ASSISTANT

## 2017-02-02 PROCEDURE — A9270 NON-COVERED ITEM OR SERVICE: HCPCS | Mod: GY | Performed by: SURGERY

## 2017-02-02 PROCEDURE — 85027 COMPLETE CBC AUTOMATED: CPT | Performed by: INTERNAL MEDICINE

## 2017-02-02 PROCEDURE — 80048 BASIC METABOLIC PNL TOTAL CA: CPT | Performed by: SURGERY

## 2017-02-02 PROCEDURE — A9270 NON-COVERED ITEM OR SERVICE: HCPCS | Mod: GY

## 2017-02-02 PROCEDURE — 25000132 ZZH RX MED GY IP 250 OP 250 PS 637: Mod: GY | Performed by: SURGERY

## 2017-02-02 PROCEDURE — 40000193 ZZH STATISTIC PT WARD VISIT

## 2017-02-02 PROCEDURE — 25000128 H RX IP 250 OP 636

## 2017-02-02 PROCEDURE — 99233 SBSQ HOSP IP/OBS HIGH 50: CPT | Performed by: NURSE PRACTITIONER

## 2017-02-02 PROCEDURE — 25000125 ZZHC RX 250: Performed by: SURGERY

## 2017-02-02 PROCEDURE — 20000004 ZZH R&B ICU UMMC

## 2017-02-02 PROCEDURE — 00000146 ZZHCL STATISTIC GLUCOSE BY METER IP

## 2017-02-02 PROCEDURE — 97110 THERAPEUTIC EXERCISES: CPT | Mod: GP

## 2017-02-02 PROCEDURE — 80048 BASIC METABOLIC PNL TOTAL CA: CPT | Performed by: INTERNAL MEDICINE

## 2017-02-02 PROCEDURE — 84100 ASSAY OF PHOSPHORUS: CPT | Performed by: SURGERY

## 2017-02-02 PROCEDURE — 27210436 ZZH NUTRITION PRODUCT SEMIELEM INTERMED CAN

## 2017-02-02 PROCEDURE — 94003 VENT MGMT INPAT SUBQ DAY: CPT

## 2017-02-02 PROCEDURE — 40000275 ZZH STATISTIC RCP TIME EA 10 MIN

## 2017-02-02 PROCEDURE — 83735 ASSAY OF MAGNESIUM: CPT | Performed by: SURGERY

## 2017-02-02 PROCEDURE — 25000128 H RX IP 250 OP 636: Performed by: ANESTHESIOLOGY

## 2017-02-02 RX ORDER — SODIUM CHLORIDE 9 MG/ML
INJECTION, SOLUTION INTRAVENOUS
Status: COMPLETED
Start: 2017-02-02 | End: 2017-02-02

## 2017-02-02 RX ORDER — GUAR GUM
1 PACKET (EA) ORAL 3 TIMES DAILY
Status: DISCONTINUED | OUTPATIENT
Start: 2017-02-02 | End: 2017-02-10 | Stop reason: HOSPADM

## 2017-02-02 RX ORDER — MEROPENEM 1 G/1
1 INJECTION, POWDER, FOR SOLUTION INTRAVENOUS EVERY 8 HOURS
Status: DISCONTINUED | OUTPATIENT
Start: 2017-02-02 | End: 2017-02-03

## 2017-02-02 RX ADMIN — CARBAMAZEPINE 200 MG: 100 SUSPENSION ORAL at 07:47

## 2017-02-02 RX ADMIN — PANCRELIPASE 48000 UNITS: 24000; 76000; 120000 CAPSULE, DELAYED RELEASE PELLETS ORAL at 23:31

## 2017-02-02 RX ADMIN — SODIUM BICARBONATE 325 MG: 325 TABLET ORAL at 00:09

## 2017-02-02 RX ADMIN — POTASSIUM PHOSPHATE, MONOBASIC AND POTASSIUM PHOSPHATE, DIBASIC 15 MMOL: 224; 236 INJECTION, SOLUTION INTRAVENOUS at 06:17

## 2017-02-02 RX ADMIN — Medication 100 MCG: at 16:45

## 2017-02-02 RX ADMIN — Medication 40 MG: at 07:47

## 2017-02-02 RX ADMIN — Medication 40 MG: at 19:38

## 2017-02-02 RX ADMIN — BRIVARACETAM 50 MG: 50 INJECTION, SUSPENSION INTRAVENOUS at 19:38

## 2017-02-02 RX ADMIN — SODIUM BICARBONATE 325 MG: 325 TABLET ORAL at 16:46

## 2017-02-02 RX ADMIN — LINEZOLID 600 MG: 100 SUSPENSION ORAL at 19:38

## 2017-02-02 RX ADMIN — Medication 1 PACKET: at 06:19

## 2017-02-02 RX ADMIN — MEROPENEM 1 G: 1 INJECTION, POWDER, FOR SOLUTION INTRAVENOUS at 17:21

## 2017-02-02 RX ADMIN — OXYCODONE HYDROCHLORIDE 10 MG: 5 SOLUTION ORAL at 19:38

## 2017-02-02 RX ADMIN — CARBAMAZEPINE 200 MG: 100 SUSPENSION ORAL at 16:45

## 2017-02-02 RX ADMIN — SODIUM BICARBONATE 325 MG: 325 TABLET ORAL at 04:02

## 2017-02-02 RX ADMIN — POTASSIUM PHOSPHATE, MONOBASIC AND POTASSIUM PHOSPHATE, DIBASIC 18 MMOL: 224; 236 INJECTION, SOLUTION INTRAVENOUS at 22:08

## 2017-02-02 RX ADMIN — CALCIUM CARBONATE 1250 MG: 1250 SUSPENSION ORAL at 17:20

## 2017-02-02 RX ADMIN — PANCRELIPASE 48000 UNITS: 24000; 76000; 120000 CAPSULE, DELAYED RELEASE PELLETS ORAL at 16:45

## 2017-02-02 RX ADMIN — ENOXAPARIN SODIUM 40 MG: 40 INJECTION SUBCUTANEOUS at 11:14

## 2017-02-02 RX ADMIN — PANCRELIPASE 48000 UNITS: 24000; 76000; 120000 CAPSULE, DELAYED RELEASE PELLETS ORAL at 00:08

## 2017-02-02 RX ADMIN — MEROPENEM 2 G: 1 INJECTION, POWDER, FOR SOLUTION INTRAVENOUS at 01:43

## 2017-02-02 RX ADMIN — Medication 15 ML: at 07:47

## 2017-02-02 RX ADMIN — PANCRELIPASE 48000 UNITS: 24000; 76000; 120000 CAPSULE, DELAYED RELEASE PELLETS ORAL at 04:02

## 2017-02-02 RX ADMIN — POTASSIUM PHOSPHATE, MONOBASIC AND POTASSIUM PHOSPHATE, DIBASIC 18 MMOL: 224; 236 INJECTION, SOLUTION INTRAVENOUS at 14:43

## 2017-02-02 RX ADMIN — Medication 1 PACKET: at 19:38

## 2017-02-02 RX ADMIN — Medication 6 MG: at 20:52

## 2017-02-02 RX ADMIN — LINEZOLID 600 MG: 100 SUSPENSION ORAL at 07:50

## 2017-02-02 RX ADMIN — SODIUM BICARBONATE 325 MG: 325 TABLET ORAL at 13:03

## 2017-02-02 RX ADMIN — POTASSIUM PHOSPHATE, MONOBASIC AND POTASSIUM PHOSPHATE, DIBASIC 18 MMOL: 224; 236 INJECTION, SOLUTION INTRAVENOUS at 06:19

## 2017-02-02 RX ADMIN — Medication 1 PACKET: at 02:10

## 2017-02-02 RX ADMIN — Medication 25 MG: at 13:09

## 2017-02-02 RX ADMIN — SODIUM BICARBONATE 325 MG: 325 TABLET ORAL at 08:05

## 2017-02-02 RX ADMIN — Medication 25 MG: at 19:38

## 2017-02-02 RX ADMIN — SODIUM CHLORIDE 1000 ML: 9 INJECTION, SOLUTION INTRAVENOUS at 11:18

## 2017-02-02 RX ADMIN — BRIVARACETAM 50 MG: 50 INJECTION, SUSPENSION INTRAVENOUS at 07:49

## 2017-02-02 RX ADMIN — PANCRELIPASE 48000 UNITS: 24000; 76000; 120000 CAPSULE, DELAYED RELEASE PELLETS ORAL at 13:02

## 2017-02-02 RX ADMIN — Medication 81 MG: at 07:48

## 2017-02-02 RX ADMIN — CALCIUM CARBONATE 1250 MG: 1250 SUSPENSION ORAL at 13:03

## 2017-02-02 RX ADMIN — SODIUM BICARBONATE 325 MG: 325 TABLET ORAL at 19:35

## 2017-02-02 RX ADMIN — PANCRELIPASE 48000 UNITS: 24000; 76000; 120000 CAPSULE, DELAYED RELEASE PELLETS ORAL at 08:06

## 2017-02-02 RX ADMIN — Medication 100 MCG: at 04:03

## 2017-02-02 RX ADMIN — CARBAMAZEPINE 200 MG: 100 SUSPENSION ORAL at 19:40

## 2017-02-02 RX ADMIN — PANCRELIPASE 48000 UNITS: 24000; 76000; 120000 CAPSULE, DELAYED RELEASE PELLETS ORAL at 19:35

## 2017-02-02 RX ADMIN — MEROPENEM 2 G: 1 INJECTION, POWDER, FOR SOLUTION INTRAVENOUS at 11:10

## 2017-02-02 RX ADMIN — POTASSIUM CHLORIDE 20 MEQ: 1.5 POWDER, FOR SOLUTION ORAL at 06:29

## 2017-02-02 RX ADMIN — Medication 100 MCG: at 23:32

## 2017-02-02 RX ADMIN — CALCIUM CARBONATE 1250 MG: 1250 SUSPENSION ORAL at 07:47

## 2017-02-02 RX ADMIN — Medication 1 PACKET: at 14:42

## 2017-02-02 RX ADMIN — CARBAMAZEPINE 200 MG: 100 SUSPENSION ORAL at 13:03

## 2017-02-02 RX ADMIN — Medication 150 MCG: at 07:48

## 2017-02-02 RX ADMIN — SODIUM BICARBONATE 325 MG: 325 TABLET ORAL at 23:31

## 2017-02-02 RX ADMIN — SODIUM CHLORIDE, PRESERVATIVE FREE 5 ML: 5 INJECTION INTRAVENOUS at 17:21

## 2017-02-02 NOTE — PROGRESS NOTES
Nutrition Progress Note - f/u for progress towards previous nutrition POC (see previous 1/31 reassessment for details)    -EN: currently receiving Impact Peptide @ goal 60 ml/hr (note this formula fiber-free) + Nutrisource Fiber (1 pkt q 4 hrs, started 1/31)  -GI: noted possibly decreasing stool outputs the last 2 days (350 ml out yesterday, 500 ml on 1/31) but per this writer's observation of rectal tube/containment system, increased gas in tubing and bag.  Ongoing watery/brown stool outputs (sediment on bottom/watery layer on top but did not appreciate any significant sx of steatorrhea).    Interventions:  Collaboration and Referral of Nutrition care - Discussed plan for FEN/GI on rounds with MDs who would like to continue with some fiber but decreased frequency to 1 pkt Nutrisource Fiber TID (3 pkts/day = 9 gm soluble fiber).  Will follow for improvements in sx and need to possibly consider anti-gas medications and/or decrease fiber further.    Laine Will ,RD, LD, CNSC (pgr [phone])

## 2017-02-02 NOTE — PLAN OF CARE
Problem: Goal Outcome Summary  Goal: Goal Outcome Summary  Outcome: No Change  Pt put on soft left wrist restrain. Pt pulling tube, informed pt not to pull tube, not compliant. Order placed for restrain.

## 2017-02-02 NOTE — PLAN OF CARE
Problem: Goal Outcome Summary  Goal: Goal Outcome Summary  Pt remains unchanged. Good urine output. Up in chair for 2 hours. Rectal tube in place. VSS. PS x2. Continue to monitor, notify MD with any concerns.

## 2017-02-02 NOTE — PROGRESS NOTES
GASTROENTEROLOGY PROGRESS NOTE    Date of Admission: 1/23/2017  Reason for Admission: necrotizing pancreatitis      Assessment:  56 year old male with a history of TBI with resultant seizures, aphasia, right sided hemiplegia, v-fib cardiac arrest, panhypopituitarism, and chronic tracheostomy who is transferred from [facility] where he was admitted 1/21 from his LTACH with increasing abd discomfort, fevers and resp distress and was found to have necrotizing pancreatitis based on CT findings of multiple locations of walled off necrosis. He underwent CT guided aspiration of the largest fluid collection in the pancreatic tail 1/22 in which cultures grew enterobacter cloacae complex + VRE.     Now s/p EUS with cystgastrostomy placement (hot axios) and 10Fr x 1cm solus stent placed through it 1/30/2017. Fluid sent for culture -- prelim showing moderate growth pseudomonas aeruginosa, light growth Enterobacter cloacae complex, candida albicans and moderate growth enterococcus faecium (susceptibility testing in process)    Recommendations:  Plan for repeat EGD/necrosectomy 2/7 with Dr. Marcus  Cont abx and tailor towards culture sens  Continue PERT   Continue PEG-J TF    Patient not seen today    PROCEDURES:  EUS 1/30/2017 - Dr. [place]   - Focused exam during which two large complex walled collections were demonstrated in the expected regions of the pancreatic neck, body and tail, possibly communicating with one another; the downstream (more medial) collection was avoided due to a large pulsating   vessel seen within the collection and layering solid content suggesting recent bleeding. The upstream collection was then approached, and cystogastrostomy created by hot technique and secured by Axios and Solus stent placement. Approximately 1L of infected solid and liquid contect was removed (a small amount of which was sent for cultures).         - Well positioned GJ not manipulated     LABS:  BMP  Recent  Labs  Lab 02/01/17  0400 01/31/17  0340 01/30/17  1430  01/30/17  0340  01/29/17  0403    136 135  < > 141  < > 150*   POTASSIUM 3.6 3.8 3.6  --  3.8  < > 3.3*   CHLORIDE 108 106  --   --  108  --  116*   STEVE 6.6* 6.6*  --   --  6.3*  --  6.6*   CO2 24 24  --   --  22  --  24   BUN 20 18  --   --  18  --  19   CR 0.66 0.63*  --   --  0.74  --  0.78   * 142* 136*  --  106*  --  103*   < > = values in this interval not displayed.  CBC  Recent Labs  Lab 02/02/17  0358 02/01/17 0400 01/31/17  0340 01/30/17  0340   WBC 12.4* 11.2* 8.1  --  10.7   RBC 3.47* 3.19* 3.05*  --  3.18*   HGB 8.3* 7.9* 7.4*  < > 7.6*   HCT 28.2* 26.0* 24.3*  --  25.6*   MCV 81 82 80  --  81   MCH 23.9* 24.8* 24.3*  --  23.9*   MCHC 29.4* 30.4* 30.5*  --  29.7*   RDW 20.3* 19.7* 19.7*  --  19.8*    249 167  --  163   < > = values in this interval not displayed.  INR    Recent Labs  Lab 01/30/17  0340   INR 1.32*     LFTs    Recent Labs  Lab 01/29/17  1255   ALBUMIN 1.6*        IMAGING:  Reviewed

## 2017-02-02 NOTE — PROGRESS NOTES
SURGICAL ICU PROGRESS NOTE  February 2, 2017      CO-MORBIDITIES:   TBI  Seizure disorder  Hemiplegia  Panhypopituitarism  Chronic tracheostomy    ASSESSMENT: 56yM with TBI resulting in seizure disorder,spastic hemiplegia, and aphasia, v-fib cardiac arrest, panhypopituitarism and chronic trach who presents as a transfer for necrotizing pancreatitis. Fluid collection at the tail of the pancreas was aspirated and grew enterobacter. Patient's home desmopressin was inadvertently not restarted and developed hypernatremia. Now resolved and stable in the low 140s. Minimal pain although taut abd today, afebrile. Leukocytosis has resolved. PPD#3 s/p cystgastrostomy, doing well    PLAN:   Neuro/ pain/ sedation:  - Monitor neurological status. Notify the MD for any acute changes in exam.  - Anti-seizure: Carbamazepine and brivaracetam  - Panhypopituitarism: synthroid, testosterone, desmopression PO  - Seroquel for sedation PRN  - Melatonin for sleep     Pulmonary care:   - Wean FiO2 to keep saturation above 90%.  - PST. Will trial trach dome today  - Duonebs     Cardiovascular:    - Monitor hemodynamic status.- Stable     GI care:   - NPO.  - Tube feeds at goal  - Meds per J tube  - G-tube gravity   - Appreciate GI recs. S/p cystgastrostomy  - CTA negative for pulsatile mass  - Plan for repeat cystgastrostomy on Friday. Possible repeat CT prior     Fluids/ Electrolytes/ Nutrition:   - Hypernatremia resolved. Resume home DDAVP TID  - Check sodium levels daily.  - ICU electrolyte replacement protocol  - No indication for parenteral nutrition.  - Nutrition consulted. Appreciate recs    Renal/ Fluid Balance:     - Will monitor intake and output.  - Lerma in place  - Oral phosphate for persistent hypophosphatemia      Endocrine:    - Appreciate endocrine recs  - Hydrocortisone 25mg BID now  (Changed from 20mg AM and 10mg PM)    - SSI    - DDAVP  mcg      ID/ Antibiotics:  - Pancreatic cultures now growing pseudomonas and  candida. Will defer treating candida now since doing well clinically.  - Meropenem for pseudomonas and linezolid for pancreatic necrosis; stop date 2/3  - Necrotic pancreatitis with fluid culture growing enterobacter and VRE     Heme:     - Hgb stable; no indication for treatment      Prophylaxis:    - Mechanical prophylaxis for DVT.  - Lovenox 40mg today. Will hold tomorrow morning  - PPI BID     MSK:     - PT and OT consulted. Appreciate recs.     Lines/ tubes/ drains:  - Trach, PIV x2, G-J tube, L PICC, aaron, rectal tube     Disposition:  - Surgical ICU.  - Referral to LTACH     Patient seen, findings and plan discussed with Dr. Montez.    Yoni Raphael, NP-C    Critical Care Nurse Practitioner  ====================================    TODAY'S PROGRESS:   SUBJECTIVE:   No acute events noted. VSS. Patient has no complaints this AM. Denies dyspnea, chest pain, palpitations, dizziness, vomiting.     OBJECTIVE:   1. VITAL SIGNS:   Temp:  [96.9  F (36.1  C)-98.6  F (37  C)] 96.9  F (36.1  C)  Heart Rate:  [] 76  Resp:  [14-22] 22  BP: ()/(47-93) 109/60 mmHg  FiO2 (%):  [30 %] 30 %  SpO2:  [99 %-100 %] 100 %  Ventilation Mode: CPAP/PS  FiO2 (%): 30 %  Rate Set (breaths/minute): 14 breaths/min  Tidal Volume Set (mL): 500 mL  PEEP (cm H2O): 5 cmH2O  Pressure Support (cm H2O): 10 cmH2O  Oxygen Concentration (%): 30 %  Resp: 22    2. INTAKE/ OUTPUT:   I/O last 3 completed shifts:  In: 2235 [I.V.:285; NG/GT:510]  Out: 2340 [Urine:2140; Stool:200]    3. PHYSICAL EXAMINATION:   General: Awake and alert  HEENT: Trach in place  Neuro: Follows commands.  CV: RRR  Pulm: Mechanically ventilated on exam  Abd: Soft; minimally tender; non-distended; G-J tube in place; no peritoneal signs  Extremities: WWP    4. INVESTIGATIONS:   Arterial Blood Gases     Recent Labs  Lab 01/30/17  1437   PH Wrong specimen type. Communicated with OR18 at 1439 1/30/17 kh.CORRECTED ON 01/30 AT 1451: PREVIOUSLY REPORTED AS 7.40   PCO2 Wrong  specimen type. Communicated with OR18 at [facility] 1/30/17 .CORRECTED ON 01/30 AT 1451: PREVIOUSLY REPORTED AS 38   PO2 Wrong specimen type. Communicated with OR18 at [facility] 1/30/17 .CORRECTED ON 01/30 AT 1451: PREVIOUSLY REPORTED AS 49   HCO3 Wrong specimen type. Communicated with OR18 at [facility] 1/30/17 .CORRECTED ON 01/30 AT 1451: PREVIOUSLY REPORTED AS 24     Complete Blood Count     Recent Labs  Lab 02/02/17  0358 02/01/17  0400 01/31/17  0340 01/30/17  1437  01/30/17  0340   WBC 12.4* 11.2* 8.1  --   --  10.7   HGB 8.3* 7.9* 7.4* Wrong specimen type. Communicated with OR18 at [facility] 1/30/17 .CORRECTED ON 01/30 AT 1451: PREVIOUSLY REPORTED AS 7.1  < > 7.6*    249 167  --   --  163   < > = values in this interval not displayed.  Basic Metabolic Panel    Recent Labs  Lab 02/02/17  0358 02/01/17  0400 01/31/17  0340 01/30/17  1437 01/30/17  0340    141 136 Wrong specimen type. Communicated with OR18 at [facility] 1/30/17 .CORRECTED ON 01/30 AT 1451: PREVIOUSLY REPORTED   < > 141   POTASSIUM 4.0 3.6 3.8 Wrong specimen type. Communicated with OR18 at [facility] 1/30/17 .CORRECTED ON 01/30 AT 1451: PREVIOUSLY REPORTED AS 3.6  < > 3.8   CHLORIDE 109 108 106  --   --  108   CO2 24 24 24  --   --  22   BUN 17 20 18  --   --  18   CR 0.65* 0.66 0.63*  --   --  0.74   GLC 90 121* 142* Wrong specimen type. Communicated with OR18 at [facility] 1/30/17 .CORRECTED ON 01/30 AT 1451: PREVIOUSLY REPORTED   < > 106*   < > = values in this interval not displayed.  Liver Function Tests    Recent Labs  Lab 01/30/17  0340 01/29/17  1255   ALBUMIN  --  1.6*   INR 1.32*  --      Coagulation Profile    Recent Labs  Lab 01/30/17  0340   INR 1.32*     =========================================

## 2017-02-02 NOTE — PLAN OF CARE
Problem: Goal Outcome Summary  Goal: Goal Outcome Summary  PT 4A: Supine<>sit with max Ax2, pt requires cues to remain calm while sitting EOB, as pt is often trying to grab onto something, reassurance provided that pt is supported. Sits EOB with mod-max A. Pt performed seated hip flex on L, but pt very protective of R LE appearing in pain when moved. Completed LE supine in bed, pt counts along reps on fingers and able to participate with RLE with encouragement in limited range. VSS throughout session.    Discharge recommendation: TCU when medically appropriate for increased safety and independence with functional mobility.

## 2017-02-03 LAB
ABO + RH BLD: NORMAL
ABO + RH BLD: NORMAL
ANION GAP SERPL CALCULATED.3IONS-SCNC: 10 MMOL/L (ref 3–14)
ANION GAP SERPL CALCULATED.3IONS-SCNC: 6 MMOL/L (ref 3–14)
BLD GP AB SCN SERPL QL: NORMAL
BLOOD BANK CMNT PATIENT-IMP: NORMAL
BUN SERPL-MCNC: 14 MG/DL (ref 7–30)
BUN SERPL-MCNC: 15 MG/DL (ref 7–30)
CALCIUM SERPL-MCNC: 7 MG/DL (ref 8.5–10.1)
CALCIUM SERPL-MCNC: 7.6 MG/DL (ref 8.5–10.1)
CHLORIDE SERPL-SCNC: 110 MMOL/L (ref 94–109)
CHLORIDE SERPL-SCNC: 110 MMOL/L (ref 94–109)
CO2 SERPL-SCNC: 23 MMOL/L (ref 20–32)
CO2 SERPL-SCNC: 25 MMOL/L (ref 20–32)
CREAT SERPL-MCNC: 0.65 MG/DL (ref 0.66–1.25)
CREAT SERPL-MCNC: 0.7 MG/DL (ref 0.66–1.25)
ERYTHROCYTE [DISTWIDTH] IN BLOOD BY AUTOMATED COUNT: 21 % (ref 10–15)
GFR SERPL CREATININE-BSD FRML MDRD: ABNORMAL ML/MIN/1.7M2
GFR SERPL CREATININE-BSD FRML MDRD: ABNORMAL ML/MIN/1.7M2
GLUCOSE BLDC GLUCOMTR-MCNC: 135 MG/DL (ref 70–99)
GLUCOSE SERPL-MCNC: 128 MG/DL (ref 70–99)
GLUCOSE SERPL-MCNC: 139 MG/DL (ref 70–99)
HCT VFR BLD AUTO: 28.9 % (ref 40–53)
HGB BLD-MCNC: 8.6 G/DL (ref 13.3–17.7)
MAGNESIUM SERPL-MCNC: 2.3 MG/DL (ref 1.6–2.3)
MCH RBC QN AUTO: 24.5 PG (ref 26.5–33)
MCHC RBC AUTO-ENTMCNC: 29.8 G/DL (ref 31.5–36.5)
MCV RBC AUTO: 82 FL (ref 78–100)
PHOSPHATE SERPL-MCNC: 2.2 MG/DL (ref 2.5–4.5)
PHOSPHATE SERPL-MCNC: 2.9 MG/DL (ref 2.5–4.5)
PLATELET # BLD AUTO: 351 10E9/L (ref 150–450)
POTASSIUM SERPL-SCNC: 3.9 MMOL/L (ref 3.4–5.3)
POTASSIUM SERPL-SCNC: 4 MMOL/L (ref 3.4–5.3)
RBC # BLD AUTO: 3.51 10E12/L (ref 4.4–5.9)
SODIUM SERPL-SCNC: 141 MMOL/L (ref 133–144)
SODIUM SERPL-SCNC: 144 MMOL/L (ref 133–144)
SPECIMEN EXP DATE BLD: NORMAL
WBC # BLD AUTO: 9.4 10E9/L (ref 4–11)

## 2017-02-03 PROCEDURE — 25000125 ZZHC RX 250: Performed by: NURSE PRACTITIONER

## 2017-02-03 PROCEDURE — 86900 BLOOD TYPING SEROLOGIC ABO: CPT | Performed by: SURGERY

## 2017-02-03 PROCEDURE — 25000132 ZZH RX MED GY IP 250 OP 250 PS 637: Mod: GY

## 2017-02-03 PROCEDURE — 25000128 H RX IP 250 OP 636: Performed by: SURGERY

## 2017-02-03 PROCEDURE — 25000132 ZZH RX MED GY IP 250 OP 250 PS 637: Mod: GY | Performed by: SURGERY

## 2017-02-03 PROCEDURE — A9270 NON-COVERED ITEM OR SERVICE: HCPCS | Mod: GY | Performed by: NURSE PRACTITIONER

## 2017-02-03 PROCEDURE — 25000125 ZZHC RX 250

## 2017-02-03 PROCEDURE — 86850 RBC ANTIBODY SCREEN: CPT | Performed by: SURGERY

## 2017-02-03 PROCEDURE — 84100 ASSAY OF PHOSPHORUS: CPT | Performed by: INTERNAL MEDICINE

## 2017-02-03 PROCEDURE — A9270 NON-COVERED ITEM OR SERVICE: HCPCS | Mod: GY | Performed by: SURGERY

## 2017-02-03 PROCEDURE — 27210436 ZZH NUTRITION PRODUCT SEMIELEM INTERMED CAN

## 2017-02-03 PROCEDURE — 25000132 ZZH RX MED GY IP 250 OP 250 PS 637: Mod: GY | Performed by: NURSE PRACTITIONER

## 2017-02-03 PROCEDURE — 80048 BASIC METABOLIC PNL TOTAL CA: CPT | Performed by: INTERNAL MEDICINE

## 2017-02-03 PROCEDURE — A9270 NON-COVERED ITEM OR SERVICE: HCPCS | Mod: GY | Performed by: PHYSICIAN ASSISTANT

## 2017-02-03 PROCEDURE — 25000132 ZZH RX MED GY IP 250 OP 250 PS 637: Mod: GY | Performed by: PHYSICIAN ASSISTANT

## 2017-02-03 PROCEDURE — 25000125 ZZHC RX 250: Performed by: SURGERY

## 2017-02-03 PROCEDURE — 40000275 ZZH STATISTIC RCP TIME EA 10 MIN

## 2017-02-03 PROCEDURE — A9270 NON-COVERED ITEM OR SERVICE: HCPCS | Mod: GY

## 2017-02-03 PROCEDURE — 20000004 ZZH R&B ICU UMMC

## 2017-02-03 PROCEDURE — 85027 COMPLETE CBC AUTOMATED: CPT | Performed by: INTERNAL MEDICINE

## 2017-02-03 PROCEDURE — 86901 BLOOD TYPING SEROLOGIC RH(D): CPT | Performed by: SURGERY

## 2017-02-03 PROCEDURE — 00000146 ZZHCL STATISTIC GLUCOSE BY METER IP

## 2017-02-03 PROCEDURE — 99233 SBSQ HOSP IP/OBS HIGH 50: CPT | Performed by: NURSE PRACTITIONER

## 2017-02-03 PROCEDURE — 94003 VENT MGMT INPAT SUBQ DAY: CPT

## 2017-02-03 RX ADMIN — BRIVARACETAM 50 MG: 50 INJECTION, SUSPENSION INTRAVENOUS at 21:24

## 2017-02-03 RX ADMIN — PANCRELIPASE 48000 UNITS: 24000; 76000; 120000 CAPSULE, DELAYED RELEASE PELLETS ORAL at 23:56

## 2017-02-03 RX ADMIN — CARBAMAZEPINE 200 MG: 100 SUSPENSION ORAL at 08:32

## 2017-02-03 RX ADMIN — QUETIAPINE FUMARATE 50 MG: 50 TABLET, FILM COATED ORAL at 21:29

## 2017-02-03 RX ADMIN — Medication 25 MG: at 08:33

## 2017-02-03 RX ADMIN — POTASSIUM CHLORIDE 20 MEQ: 1.5 POWDER, FOR SOLUTION ORAL at 12:55

## 2017-02-03 RX ADMIN — Medication 1 PACKET: at 14:53

## 2017-02-03 RX ADMIN — PANCRELIPASE 48000 UNITS: 24000; 76000; 120000 CAPSULE, DELAYED RELEASE PELLETS ORAL at 08:29

## 2017-02-03 RX ADMIN — Medication 1 PACKET: at 21:34

## 2017-02-03 RX ADMIN — Medication 1 PACKET: at 08:35

## 2017-02-03 RX ADMIN — Medication 15 ML: at 08:31

## 2017-02-03 RX ADMIN — LINEZOLID 600 MG: 100 SUSPENSION ORAL at 08:33

## 2017-02-03 RX ADMIN — Medication 40 MG: at 21:30

## 2017-02-03 RX ADMIN — CARBAMAZEPINE 200 MG: 100 SUSPENSION ORAL at 12:54

## 2017-02-03 RX ADMIN — POTASSIUM PHOSPHATE, MONOBASIC AND POTASSIUM PHOSPHATE, DIBASIC 18 MMOL: 224; 236 INJECTION, SOLUTION INTRAVENOUS at 21:29

## 2017-02-03 RX ADMIN — SODIUM BICARBONATE 325 MG: 325 TABLET ORAL at 16:14

## 2017-02-03 RX ADMIN — SODIUM BICARBONATE 325 MG: 325 TABLET ORAL at 23:55

## 2017-02-03 RX ADMIN — OXYCODONE HYDROCHLORIDE 10 MG: 5 SOLUTION ORAL at 01:59

## 2017-02-03 RX ADMIN — SODIUM BICARBONATE 325 MG: 325 TABLET ORAL at 04:01

## 2017-02-03 RX ADMIN — POTASSIUM PHOSPHATE, MONOBASIC AND POTASSIUM PHOSPHATE, DIBASIC 18 MMOL: 224; 236 INJECTION, SOLUTION INTRAVENOUS at 06:42

## 2017-02-03 RX ADMIN — POTASSIUM PHOSPHATE, MONOBASIC AND POTASSIUM PHOSPHATE, DIBASIC 18 MMOL: 224; 236 INJECTION, SOLUTION INTRAVENOUS at 14:54

## 2017-02-03 RX ADMIN — Medication 100 MCG: at 08:34

## 2017-02-03 RX ADMIN — PANCRELIPASE 48000 UNITS: 24000; 76000; 120000 CAPSULE, DELAYED RELEASE PELLETS ORAL at 04:01

## 2017-02-03 RX ADMIN — ENOXAPARIN SODIUM 40 MG: 40 INJECTION SUBCUTANEOUS at 10:38

## 2017-02-03 RX ADMIN — PANCRELIPASE 48000 UNITS: 24000; 76000; 120000 CAPSULE, DELAYED RELEASE PELLETS ORAL at 16:14

## 2017-02-03 RX ADMIN — SODIUM BICARBONATE 325 MG: 325 TABLET ORAL at 12:52

## 2017-02-03 RX ADMIN — PANCRELIPASE 48000 UNITS: 24000; 76000; 120000 CAPSULE, DELAYED RELEASE PELLETS ORAL at 21:20

## 2017-02-03 RX ADMIN — Medication 81 MG: at 08:32

## 2017-02-03 RX ADMIN — TESTOSTERONE CYPIONATE 200 MG: 200 INJECTION, SOLUTION INTRAMUSCULAR at 10:38

## 2017-02-03 RX ADMIN — SODIUM CHLORIDE, PRESERVATIVE FREE 5 ML: 5 INJECTION INTRAVENOUS at 18:20

## 2017-02-03 RX ADMIN — Medication 150 MCG: at 08:33

## 2017-02-03 RX ADMIN — CARBAMAZEPINE 200 MG: 100 SUSPENSION ORAL at 16:16

## 2017-02-03 RX ADMIN — Medication 100 MCG: at 16:14

## 2017-02-03 RX ADMIN — Medication 40 MG: at 08:32

## 2017-02-03 RX ADMIN — POTASSIUM PHOSPHATE, MONOBASIC AND POTASSIUM PHOSPHATE, DIBASIC 15 MMOL: 224; 236 INJECTION, SOLUTION INTRAVENOUS at 01:58

## 2017-02-03 RX ADMIN — SODIUM BICARBONATE 325 MG: 325 TABLET ORAL at 08:29

## 2017-02-03 RX ADMIN — BRIVARACETAM 50 MG: 50 INJECTION, SUSPENSION INTRAVENOUS at 08:31

## 2017-02-03 RX ADMIN — Medication 10 MG: at 21:22

## 2017-02-03 RX ADMIN — CALCIUM CARBONATE 1250 MG: 1250 SUSPENSION ORAL at 12:54

## 2017-02-03 RX ADMIN — PANCRELIPASE 48000 UNITS: 24000; 76000; 120000 CAPSULE, DELAYED RELEASE PELLETS ORAL at 12:53

## 2017-02-03 RX ADMIN — MEROPENEM 1 G: 1 INJECTION, POWDER, FOR SOLUTION INTRAVENOUS at 11:08

## 2017-02-03 RX ADMIN — Medication 6 MG: at 21:29

## 2017-02-03 RX ADMIN — CALCIUM CARBONATE 1250 MG: 1250 SUSPENSION ORAL at 18:20

## 2017-02-03 RX ADMIN — MEROPENEM 1 G: 1 INJECTION, POWDER, FOR SOLUTION INTRAVENOUS at 01:10

## 2017-02-03 RX ADMIN — CALCIUM CARBONATE 1250 MG: 1250 SUSPENSION ORAL at 08:34

## 2017-02-03 RX ADMIN — CARBAMAZEPINE 200 MG: 100 SUSPENSION ORAL at 21:28

## 2017-02-03 RX ADMIN — SODIUM BICARBONATE 325 MG: 325 TABLET ORAL at 21:21

## 2017-02-03 ASSESSMENT — PAIN DESCRIPTION - DESCRIPTORS: DESCRIPTORS: TENDER

## 2017-02-03 NOTE — PROGRESS NOTES
"  GASTROENTEROLOGY PROGRESS NOTE    Date of Admission: 1/23/2017  Reason for Admission: necrotizing pancreatitis      Assessment:  56 year old male with a history of TBI with resultant seizures, aphasia, right sided hemiplegia, v-fib cardiac arrest, panhypopituitarism, and chronic tracheostomy who is transferred from [facility] where he was admitted 1/21 from his LTACH with increasing abd discomfort, fevers and resp distress and was found to have necrotizing pancreatitis based on CT findings of multiple locations of walled off necrosis. He did undergo CT guided aspiration of the largest fluid collection in the pancreatic tail 1/22 in which cultures grew enterobacter cloacae complex + VRE.     Now s/p EUS with cystgastrostomy placement (hot axios) and 10Fr x 1cm solus stent placed through it 1/30/2017. Fluid sent for culture -- prelim showing moderate growth pseudomonas aeruginosa, light growth Enterobacter cloacae complex, candida albicans and moderate growth enterococcus faecium (VRE). Currently on Linezolid and Meropenem.    Recommendations:  Plan for repeat EUS/necrosectomy Tuesday 2/7 with Dr. Marcus  Repeat CT scan with IV contrast Monday  Cont abx and tailor towards culture sens  Continue PERT   Continue PEG-J TF      The patient was discussed and plan agreed upon with GI staff, Dr Marcus.      Meliza Lofton PA-C   Therapeutic Endoscopy/Pancreaticobiliary [facility]  [facility]  Pager *[phone]  _______________________________________________________________    Interim since last evaluated: Patient seen and examined at 0930. He nods his head \"yes\" when asked but abdominal pain today. No fevers, vitals stable. WBC decreased.    Objective:  Blood pressure 116/71, temperature 98.6  F (37  C), temperature source Axillary, resp. rate 21, height 1.651 m (5' 5\"), weight 74.7 kg (164 lb 10.9 oz), SpO2 100 %.  General: NAD, cooperative  Abd: Soft, tender upper " abdomen        PROCEDURES:  1/22/2017 - CT guided aspiration of necrotic collection ([facility])  1/30/2017 - EUS with cystgastrostomy, hot Axios and solus stent placement       LABS:  BMP  Recent Labs  Lab 02/01/17  0400 01/31/17  0340 01/30/17  1430  01/30/17  0340  01/29/17  0403    136 135  < > 141  < > 150*   POTASSIUM 3.6 3.8 3.6  --  3.8  < > 3.3*   CHLORIDE 108 106  --   --  108  --  116*   STEVE 6.6* 6.6*  --   --  6.3*  --  6.6*   CO2 24 24  --   --  22  --  24   BUN 20 18  --   --  18  --  19   CR 0.66 0.63*  --   --  0.74  --  0.78   * 142* 136*  --  106*  --  103*   < > = values in this interval not displayed.  CBC    Recent Labs  Lab 02/03/17  0408 02/02/17  0358 02/01/17  0400 01/31/17  0340   WBC 9.4 12.4* 11.2* 8.1   RBC 3.51* 3.47* 3.19* 3.05*   HGB 8.6* 8.3* 7.9* 7.4*   HCT 28.9* 28.2* 26.0* 24.3*   MCV 82 81 82 80   MCH 24.5* 23.9* 24.8* 24.3*   MCHC 29.8* 29.4* 30.4* 30.5*   RDW 21.0* 20.3* 19.7* 19.7*    344 249 167     INR    Recent Labs  Lab 01/30/17  0340   INR 1.32*     LFTs    Recent Labs  Lab 01/29/17  1255   ALBUMIN 1.6*        IMAGING:  Reviewed
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
## 2017-02-03 NOTE — PROGRESS NOTES
SURGICAL ICU PROGRESS NOTE  February 3, 2017      CO-MORBIDITIES:   TBI  Seizure disorder  Hemiplegia  Panhypopituitarism  Chronic tracheostomy    ASSESSMENT: 56yM with TBI resulting in seizure disorder,spastic hemiplegia, and aphasia, v-fib cardiac arrest, panhypopituitarism and chronic trach who presents as a transfer for necrotizing pancreatitis. Fluid collection at the tail of the pancreas was aspirated and grew enterobacter. Patient's home desmopressin was inadvertently not restarted and developed hypernatremia. Now resolved and stable in the low 140s. Minimal pain although taut abd today, afebrile. Leukocytosis has resolved. PPD#4 s/p cystgastrostomy, doing well    PLAN:   Neuro/ pain/ sedation:  - Monitor neurological status. Notify the MD for any acute changes in exam.  - Anti-seizure: Carbamazepine and brivaracetam  - Panhypopituitarism: synthroid, testosterone, desmopression PO  - Seroquel for sedation PRN  - Melatonin for sleep     Pulmonary care:   - Wean FiO2 to keep saturation above 90%.  - PST yesterday with period of apnea. PST today, goal to trach dome today  - Rest on CMV overnight  - Tristan     Cardiovascular:    - Monitor hemodynamic status.- Stable     GI care:   - NPO.  - Tube feeds at goal  - Meds per J tube  - G-tube gravity   - Appreciate GI recs. S/p cystgastrostomy  - CTA negative for pulsatile mass  - Plan for EGD/necrosectomy 2/7    Fluids/ Electrolytes/ Nutrition:   - Hypernatremia resolved. Resume home DDAVP TID  - Check sodium levels daily.  - ICU electrolyte replacement protocol  - No indication for parenteral nutrition.  - Nutrition consulted. Appreciate recs    Renal/ Fluid Balance:     - Will monitor intake and output.  - Lerma in place  - Oral phosphate for persistent hypophosphatemia. Phos improved today 2.9     Endocrine:    - Appreciate endocrine recs  - Hydrocortisone 25mg BID now  (Changed from 20mg AM and 10mg PM)    - SSI    - DDAVP  mcg      ID/ Antibiotics:  -  Pancreatic cultures now growing pseudomonas and candida. Will defer treating candida now since doing well clinically.  - Meropenem for pseudomonas and linezolid for pancreatic necrosis; Stop today   - Necrotic pancreatitis with fluid culture growing enterobacter and VRE (completed course of Merropenem and linezolid, end date 2/3)     Heme:     - Hgb stable; no indication for treatment      Prophylaxis:    - Mechanical prophylaxis for DVT.  - Lovenox 40mg qd. Hold pre-procedure on 2/7  - PPI BID      MSK:     - PT and OT consulted. Appreciate recs.     Lines/ tubes/ drains:  - Trach, PIV x2, G-J tube, L PICC, aaron     Disposition:  - Surgical ICU.  - Referral to [facility]     Patient seen, findings and plan discussed with Dr. Montez.    Katrin De La Mater    ====================================    TODAY'S PROGRESS:   SUBJECTIVE:   No acute events noted. VSS. Patient has no complaints this AM. Denies dyspnea, chest pain, palpitations, dizziness, vomiting.     OBJECTIVE:   1. VITAL SIGNS:   Temp:  [96.9  F (36.1  C)-98.6  F (37  C)] 98.6  F (37  C)  Heart Rate:  [56-93] 69  Resp:  [14-21] 21  BP: ()/(54-77) 116/71 mmHg  FiO2 (%):  [30 %] 30 %  SpO2:  [92 %-100 %] 100 %  Ventilation Mode: CPAP/PS  FiO2 (%): 30 %  Rate Set (breaths/minute): 14 breaths/min  Tidal Volume Set (mL): 500 mL  PEEP (cm H2O): 5 cmH2O  Pressure Support (cm H2O): 10 cmH2O  Oxygen Concentration (%): 30 %  Resp: 21    2. INTAKE/ OUTPUT:   I/O last 3 completed shifts:  In: 2650 [I.V.:570; NG/GT:700]  Out: 3895 [Urine:3770; Emesis/NG output:125]    3. PHYSICAL EXAMINATION:   General: Awake and alert, NAD  HEENT: Trach in place secured.  Neuro: Follows commands. Right hemiparesis.   CV: RRR, no murmurs.   Pulm: Mechanically ventilated, diminished in bilateral bases.   Abd: Soft; minimally tender; non-distended; G-J tube in place; no peritoneal signs. G tube open to gravity.   : aaron in place.   Extremities: WWP.   Skin: no lesions, rash      4.  INVESTIGATIONS:   Arterial Blood Gases     Recent Labs  Lab 01/30/17  1437   PH Wrong specimen type. Communicated with OR18 at 1439 1/30/17 kh.CORRECTED ON 01/30 AT 1451: PREVIOUSLY REPORTED AS 7.40   PCO2 Wrong specimen type. Communicated with OR18 at 1439 1/30/17 kh.CORRECTED ON 01/30 AT 1451: PREVIOUSLY REPORTED AS 38   PO2 Wrong specimen type. Communicated with OR18 at 1439 1/30/17 kh.CORRECTED ON 01/30 AT 1451: PREVIOUSLY REPORTED AS 49   HCO3 Wrong specimen type. Communicated with OR18 at 1439 1/30/17 kh.CORRECTED ON 01/30 AT 1451: PREVIOUSLY REPORTED AS 24     Complete Blood Count     Recent Labs  Lab 02/03/17  0408 02/02/17  0358 02/01/17  0400 01/31/17  0340   WBC 9.4 12.4* 11.2* 8.1   HGB 8.6* 8.3* 7.9* 7.4*    344 249 167     Basic Metabolic Panel    Recent Labs  Lab 02/03/17  0408 02/02/17  2306 02/02/17  0358 02/01/17  0400    141 142 141   POTASSIUM 3.9 4.0 4.0 3.6   CHLORIDE 110* 110* 109 108   CO2 23 25 24 24   BUN 15 14 17 20   CR 0.65* 0.70 0.65* 0.66   * 139* 90 121*     Liver Function Tests    Recent Labs  Lab 01/30/17  0340 01/29/17  1255   ALBUMIN  --  1.6*   INR 1.32*  --      Coagulation Profile    Recent Labs  Lab 01/30/17  0340   INR 1.32*     =========================================

## 2017-02-03 NOTE — PLAN OF CARE
Problem: Goal Outcome Summary  Goal: Goal Outcome Summary  D/I:  Pt alert and following commands. Pt back on restraints for pulling tube from trach. Pt on pressure support this morning and back to CMV/AC; going apneic when falling asleep. Rectal tube out at this time, no BM after tube out; pt refuses to have rectal tube back in place. Pt has some discomfort around abdomen and sacrum; repositioned and offered pain medication; pt refused.     A/P:  Continue abx, follow POC. Notify MD for any changes or conern.

## 2017-02-03 NOTE — PLAN OF CARE
Problem: Goal Outcome Summary  Goal: Goal Outcome Summary  Outcome: No Change  D: 56 M admitted with necrotizing pancreatitis.    I/A: Afebrile. SR; HR 40-80s. Soft BP; MAPs >65. Trached on 30% FiO2 and tolerating well. Cough when turning; scant amount of secretions. Uncooperative with cares. Neuros unchanged. TF at goal via G/J. Minimal output from g-tube. UOP trending down, high as 300 ml/hr (MD notified and BMP drawn). Incontinent of stool x1. Red and painful periarea; barrier cream provided. See flowsheets for full assessments.   P: Pressure support this morning with hope to trach dome today. Continue with skin care and repositioning. Update team with concerns.

## 2017-02-03 NOTE — H&P
Care Coordinator Progress Note     Admission Date/Time:  1/23/2017  Attending MD:  Kenneth Obrien MD     Data  Chart reviewed, discussed with interdisciplinary team.   Patient transferred form [facility] due to Necrotizing pancreatitis and hypoxemic respiratory failure.      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  Pt transferred from [facility] with Necrotizing pancreatitis and hypoxemic respiratory failure.   Pt remain in ICU vented via trach.  Per morning report and chart review, plan for repeat EGD/necrosectomy on 2/7 with Dr. Marcus.      Plan  Anticipated Discharge Date:  TBD.  Anticipated Discharge Plan:   Transfer back to [facility] when medically stable.  CC will cont to follow plan of care.      Ankush Scott RN, BSN  4A and 4E/ ICU  Care Coordinator  Phone: [phone]  Pager: [phone]

## 2017-02-04 ENCOUNTER — APPOINTMENT (OUTPATIENT)
Dept: PHYSICAL THERAPY | Facility: CLINIC | Age: 57
DRG: 405 | End: 2017-02-04
Attending: INTERNAL MEDICINE
Payer: MEDICARE

## 2017-02-04 LAB
ANION GAP SERPL CALCULATED.3IONS-SCNC: 9 MMOL/L (ref 3–14)
BUN SERPL-MCNC: 19 MG/DL (ref 7–30)
C DIFF TOX B STL QL: NORMAL
CALCIUM SERPL-MCNC: 7.4 MG/DL (ref 8.5–10.1)
CHLORIDE SERPL-SCNC: 105 MMOL/L (ref 94–109)
CO2 SERPL-SCNC: 25 MMOL/L (ref 20–32)
CREAT SERPL-MCNC: 0.67 MG/DL (ref 0.66–1.25)
ERYTHROCYTE [DISTWIDTH] IN BLOOD BY AUTOMATED COUNT: 20.7 % (ref 10–15)
GFR SERPL CREATININE-BSD FRML MDRD: ABNORMAL ML/MIN/1.7M2
GLUCOSE SERPL-MCNC: 104 MG/DL (ref 70–99)
HCT VFR BLD AUTO: 28.4 % (ref 40–53)
HGB BLD-MCNC: 8.4 G/DL (ref 13.3–17.7)
MAGNESIUM SERPL-MCNC: 2 MG/DL (ref 1.6–2.3)
MCH RBC QN AUTO: 23.9 PG (ref 26.5–33)
MCHC RBC AUTO-ENTMCNC: 29.6 G/DL (ref 31.5–36.5)
MCV RBC AUTO: 81 FL (ref 78–100)
PHOSPHATE SERPL-MCNC: 2.3 MG/DL (ref 2.5–4.5)
PLATELET # BLD AUTO: 357 10E9/L (ref 150–450)
POTASSIUM SERPL-SCNC: 4 MMOL/L (ref 3.4–5.3)
RBC # BLD AUTO: 3.51 10E12/L (ref 4.4–5.9)
SODIUM SERPL-SCNC: 139 MMOL/L (ref 133–144)
SPECIMEN SOURCE: NORMAL
WBC # BLD AUTO: 11 10E9/L (ref 4–11)

## 2017-02-04 PROCEDURE — 40000275 ZZH STATISTIC RCP TIME EA 10 MIN

## 2017-02-04 PROCEDURE — 25000132 ZZH RX MED GY IP 250 OP 250 PS 637: Mod: GY | Performed by: PHYSICIAN ASSISTANT

## 2017-02-04 PROCEDURE — 40000193 ZZH STATISTIC PT WARD VISIT: Performed by: PHYSICAL THERAPIST

## 2017-02-04 PROCEDURE — 84100 ASSAY OF PHOSPHORUS: CPT | Performed by: INTERNAL MEDICINE

## 2017-02-04 PROCEDURE — 25000132 ZZH RX MED GY IP 250 OP 250 PS 637: Mod: GY | Performed by: NURSE PRACTITIONER

## 2017-02-04 PROCEDURE — 80048 BASIC METABOLIC PNL TOTAL CA: CPT | Performed by: INTERNAL MEDICINE

## 2017-02-04 PROCEDURE — 27210436 ZZH NUTRITION PRODUCT SEMIELEM INTERMED CAN

## 2017-02-04 PROCEDURE — A9270 NON-COVERED ITEM OR SERVICE: HCPCS | Mod: GY | Performed by: PHYSICIAN ASSISTANT

## 2017-02-04 PROCEDURE — 25000125 ZZHC RX 250: Performed by: NURSE PRACTITIONER

## 2017-02-04 PROCEDURE — 25000125 ZZHC RX 250

## 2017-02-04 PROCEDURE — 40000047 ZZH STATISTIC CTO2 CONT OXYGEN TECH TIME EA 90 MIN

## 2017-02-04 PROCEDURE — A9270 NON-COVERED ITEM OR SERVICE: HCPCS | Mod: GY | Performed by: INTERNAL MEDICINE

## 2017-02-04 PROCEDURE — 12000001 ZZH R&B MED SURG/OB UMMC

## 2017-02-04 PROCEDURE — 25000132 ZZH RX MED GY IP 250 OP 250 PS 637: Mod: GY | Performed by: INTERNAL MEDICINE

## 2017-02-04 PROCEDURE — 97530 THERAPEUTIC ACTIVITIES: CPT | Mod: GP | Performed by: PHYSICAL THERAPIST

## 2017-02-04 PROCEDURE — 99232 SBSQ HOSP IP/OBS MODERATE 35: CPT | Mod: GC | Performed by: INTERNAL MEDICINE

## 2017-02-04 PROCEDURE — 25000132 ZZH RX MED GY IP 250 OP 250 PS 637: Mod: GY | Performed by: SURGERY

## 2017-02-04 PROCEDURE — 99291 CRITICAL CARE FIRST HOUR: CPT | Mod: GC | Performed by: SURGERY

## 2017-02-04 PROCEDURE — A9270 NON-COVERED ITEM OR SERVICE: HCPCS | Mod: GY

## 2017-02-04 PROCEDURE — 83735 ASSAY OF MAGNESIUM: CPT | Performed by: INTERNAL MEDICINE

## 2017-02-04 PROCEDURE — A9270 NON-COVERED ITEM OR SERVICE: HCPCS | Mod: GY | Performed by: SURGERY

## 2017-02-04 PROCEDURE — 85027 COMPLETE CBC AUTOMATED: CPT | Performed by: INTERNAL MEDICINE

## 2017-02-04 PROCEDURE — 25000132 ZZH RX MED GY IP 250 OP 250 PS 637: Mod: GY

## 2017-02-04 PROCEDURE — 87493 C DIFF AMPLIFIED PROBE: CPT | Performed by: SURGERY

## 2017-02-04 PROCEDURE — 25000128 H RX IP 250 OP 636: Performed by: SURGERY

## 2017-02-04 PROCEDURE — 40000014 ZZH STATISTIC ARTERIAL MONITORING DAILY

## 2017-02-04 PROCEDURE — A9270 NON-COVERED ITEM OR SERVICE: HCPCS | Mod: GY | Performed by: NURSE PRACTITIONER

## 2017-02-04 RX ORDER — ZINC OXIDE
OINTMENT (GRAM) TOPICAL
Status: DISCONTINUED | OUTPATIENT
Start: 2017-02-04 | End: 2017-02-10 | Stop reason: HOSPADM

## 2017-02-04 RX ADMIN — Medication: at 23:30

## 2017-02-04 RX ADMIN — Medication 100 MCG: at 17:03

## 2017-02-04 RX ADMIN — CALCIUM CARBONATE 1250 MG: 1250 SUSPENSION ORAL at 08:50

## 2017-02-04 RX ADMIN — Medication 6 MG: at 20:10

## 2017-02-04 RX ADMIN — Medication 10 MG: at 17:07

## 2017-02-04 RX ADMIN — SODIUM BICARBONATE 325 MG: 325 TABLET ORAL at 19:45

## 2017-02-04 RX ADMIN — POTASSIUM CHLORIDE 20 MEQ: 1.5 POWDER, FOR SOLUTION ORAL at 07:00

## 2017-02-04 RX ADMIN — ENOXAPARIN SODIUM 40 MG: 40 INJECTION SUBCUTANEOUS at 12:35

## 2017-02-04 RX ADMIN — PANCRELIPASE 48000 UNITS: 24000; 76000; 120000 CAPSULE, DELAYED RELEASE PELLETS ORAL at 19:45

## 2017-02-04 RX ADMIN — POTASSIUM PHOSPHATE, MONOBASIC AND POTASSIUM PHOSPHATE, DIBASIC 18 MMOL: 224; 236 INJECTION, SOLUTION INTRAVENOUS at 17:03

## 2017-02-04 RX ADMIN — SODIUM BICARBONATE 325 MG: 325 TABLET ORAL at 12:56

## 2017-02-04 RX ADMIN — Medication 100 MCG: at 08:47

## 2017-02-04 RX ADMIN — CARBAMAZEPINE 200 MG: 100 SUSPENSION ORAL at 12:57

## 2017-02-04 RX ADMIN — Medication 15 ML: at 08:59

## 2017-02-04 RX ADMIN — SODIUM BICARBONATE 325 MG: 325 TABLET ORAL at 03:56

## 2017-02-04 RX ADMIN — Medication 81 MG: at 08:52

## 2017-02-04 RX ADMIN — PANCRELIPASE 24000 UNITS: 24000; 76000; 120000 CAPSULE, DELAYED RELEASE PELLETS ORAL at 08:53

## 2017-02-04 RX ADMIN — POTASSIUM PHOSPHATE, MONOBASIC AND POTASSIUM PHOSPHATE, DIBASIC 15 MMOL: 224; 236 INJECTION, SOLUTION INTRAVENOUS at 21:12

## 2017-02-04 RX ADMIN — CALCIUM CARBONATE 1250 MG: 1250 SUSPENSION ORAL at 12:53

## 2017-02-04 RX ADMIN — Medication 40 MG: at 19:48

## 2017-02-04 RX ADMIN — POTASSIUM PHOSPHATE, MONOBASIC AND POTASSIUM PHOSPHATE, DIBASIC 18 MMOL: 224; 236 INJECTION, SOLUTION INTRAVENOUS at 21:12

## 2017-02-04 RX ADMIN — Medication 40 MG: at 09:00

## 2017-02-04 RX ADMIN — CALCIUM CARBONATE 1250 MG: 1250 SUSPENSION ORAL at 17:03

## 2017-02-04 RX ADMIN — Medication 150 MCG: at 07:01

## 2017-02-04 RX ADMIN — POTASSIUM PHOSPHATE, MONOBASIC AND POTASSIUM PHOSPHATE, DIBASIC 18 MMOL: 224; 236 INJECTION, SOLUTION INTRAVENOUS at 07:01

## 2017-02-04 RX ADMIN — SODIUM BICARBONATE 325 MG: 325 TABLET ORAL at 08:50

## 2017-02-04 RX ADMIN — CARBAMAZEPINE 200 MG: 100 SUSPENSION ORAL at 17:03

## 2017-02-04 RX ADMIN — CARBAMAZEPINE 200 MG: 100 SUSPENSION ORAL at 08:51

## 2017-02-04 RX ADMIN — Medication 100 MCG: at 00:03

## 2017-02-04 RX ADMIN — Medication 20 MG: at 11:27

## 2017-02-04 RX ADMIN — SODIUM BICARBONATE 325 MG: 325 TABLET ORAL at 17:04

## 2017-02-04 RX ADMIN — Medication 1 PACKET: at 17:03

## 2017-02-04 RX ADMIN — BRIVARACETAM 50 MG: 50 INJECTION, SUSPENSION INTRAVENOUS at 19:48

## 2017-02-04 RX ADMIN — CARBAMAZEPINE 200 MG: 100 SUSPENSION ORAL at 19:48

## 2017-02-04 RX ADMIN — PANCRELIPASE 24000 UNITS: 24000; 76000; 120000 CAPSULE, DELAYED RELEASE PELLETS ORAL at 12:54

## 2017-02-04 RX ADMIN — BRIVARACETAM 50 MG: 50 INJECTION, SUSPENSION INTRAVENOUS at 12:45

## 2017-02-04 RX ADMIN — PANCRELIPASE 48000 UNITS: 24000; 76000; 120000 CAPSULE, DELAYED RELEASE PELLETS ORAL at 17:04

## 2017-02-04 RX ADMIN — PANCRELIPASE 48000 UNITS: 24000; 76000; 120000 CAPSULE, DELAYED RELEASE PELLETS ORAL at 03:51

## 2017-02-04 RX ADMIN — Medication 1 PACKET: at 20:24

## 2017-02-04 ASSESSMENT — PAIN DESCRIPTION - DESCRIPTORS
DESCRIPTORS: TENDER
DESCRIPTORS: TENDER

## 2017-02-04 NOTE — PLAN OF CARE
Problem: Goal Outcome Summary  Goal: Goal Outcome Summary  Outcome: Improving  Patient remained on trach dome at 40% all night, tolerated very well. Patient slept comfortably throughout night. Patient heart rate dropped to low 50's/high 40's with deep sleep. No other symptoms presents, SICU notified, continue to monitor. Patient continues to have pain with stool cleanup, does get agitated with perineal cares. See flowsheets for further details.     Plan: Most likely transfer to  for further care, continue to trach dome, continue to manage bowel incontinence. Will update service with changes or concerns regarding patient condition.

## 2017-02-04 NOTE — PLAN OF CARE
Problem: Goal Outcome Summary  Goal: Goal Outcome Summary  PT 4A: Pt required MAXA x 2 for rolling. Sling used for supine>sit given increased assist level noted. Pt participated in edge of bed sitting x ~ 5 min with MODA x 2, cues for safe sitting position. VSS, 95+%, FiO2 40% on trach dome. Increased RR noted by RN with activity. Pt further transferred to chair due to fatigue and was boosted up in chair with cushion underneath. LE spasms noted. RN also cited concerns of skin integrity at buttocks/rectum and pt reported discomfort at site.  Given it was pt's first time up in the chair, encouraged shorter duration of sitting, with trial of sitting ~1/2 hr-1 hr max.     Given pt's reduced strength and current inability to pivot (pt able at baseline) along with medical care needs, recommend LTACH at discharge.

## 2017-02-04 NOTE — PLAN OF CARE
Problem: Goal Outcome Summary  Goal: Goal Outcome Summary  Outcome: Improving  D/I/A: Neuro: Alert and oriented, calls appropriately. Agitated at times, hitting staff with cares.   CV: HR 80s-90s. BP stable.  Pulm: Trach dome at 40% all day, 02 sats 100%.  GI: TF running through J at goal rate of 60/hr. 3x loose BMs this shift. G clamped per order.  : Lerma in place, output 150ml/hr  Skin: Bottom red and sore, nonblanchanble and blanchable redness. Barrier cream applied frequently. Powder applied to groin area.   Plan: Possible transfer to floor tomorrow.

## 2017-02-04 NOTE — PLAN OF CARE
Problem: Goal Outcome Summary  Goal: Goal Outcome Summary  OT 4A Cx Pt with other provider
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
## 2017-02-05 LAB
ANION GAP SERPL CALCULATED.3IONS-SCNC: 9 MMOL/L (ref 3–14)
BUN SERPL-MCNC: 25 MG/DL (ref 7–30)
CALCIUM SERPL-MCNC: 7.3 MG/DL (ref 8.5–10.1)
CHLORIDE SERPL-SCNC: 103 MMOL/L (ref 94–109)
CO2 SERPL-SCNC: 25 MMOL/L (ref 20–32)
CREAT SERPL-MCNC: 0.68 MG/DL (ref 0.66–1.25)
ERYTHROCYTE [DISTWIDTH] IN BLOOD BY AUTOMATED COUNT: 20.9 % (ref 10–15)
GFR SERPL CREATININE-BSD FRML MDRD: ABNORMAL ML/MIN/1.7M2
GLUCOSE BLDC GLUCOMTR-MCNC: 102 MG/DL (ref 70–99)
GLUCOSE BLDC GLUCOMTR-MCNC: 111 MG/DL (ref 70–99)
GLUCOSE SERPL-MCNC: 94 MG/DL (ref 70–99)
HCT VFR BLD AUTO: 30.2 % (ref 40–53)
HGB BLD-MCNC: 9 G/DL (ref 13.3–17.7)
MAGNESIUM SERPL-MCNC: 1.9 MG/DL (ref 1.6–2.3)
MCH RBC QN AUTO: 24.6 PG (ref 26.5–33)
MCHC RBC AUTO-ENTMCNC: 29.8 G/DL (ref 31.5–36.5)
MCV RBC AUTO: 83 FL (ref 78–100)
PHOSPHATE SERPL-MCNC: 1.8 MG/DL (ref 2.5–4.5)
PLATELET # BLD AUTO: 399 10E9/L (ref 150–450)
POTASSIUM SERPL-SCNC: 4.2 MMOL/L (ref 3.4–5.3)
RBC # BLD AUTO: 3.66 10E12/L (ref 4.4–5.9)
SODIUM SERPL-SCNC: 136 MMOL/L (ref 133–144)
WBC # BLD AUTO: 12.6 10E9/L (ref 4–11)

## 2017-02-05 PROCEDURE — 83735 ASSAY OF MAGNESIUM: CPT | Performed by: INTERNAL MEDICINE

## 2017-02-05 PROCEDURE — 80048 BASIC METABOLIC PNL TOTAL CA: CPT | Performed by: INTERNAL MEDICINE

## 2017-02-05 PROCEDURE — 84100 ASSAY OF PHOSPHORUS: CPT | Performed by: INTERNAL MEDICINE

## 2017-02-05 PROCEDURE — 25000132 ZZH RX MED GY IP 250 OP 250 PS 637: Mod: GY | Performed by: PHYSICIAN ASSISTANT

## 2017-02-05 PROCEDURE — A9270 NON-COVERED ITEM OR SERVICE: HCPCS | Mod: GY | Performed by: SURGERY

## 2017-02-05 PROCEDURE — A9270 NON-COVERED ITEM OR SERVICE: HCPCS | Mod: GY | Performed by: NURSE PRACTITIONER

## 2017-02-05 PROCEDURE — 25000125 ZZHC RX 250: Performed by: NURSE PRACTITIONER

## 2017-02-05 PROCEDURE — 25000125 ZZHC RX 250

## 2017-02-05 PROCEDURE — 85027 COMPLETE CBC AUTOMATED: CPT | Performed by: INTERNAL MEDICINE

## 2017-02-05 PROCEDURE — 27210436 ZZH NUTRITION PRODUCT SEMIELEM INTERMED CAN

## 2017-02-05 PROCEDURE — 25000125 ZZHC RX 250: Performed by: SURGERY

## 2017-02-05 PROCEDURE — 25000132 ZZH RX MED GY IP 250 OP 250 PS 637: Mod: GY | Performed by: NURSE PRACTITIONER

## 2017-02-05 PROCEDURE — 12000001 ZZH R&B MED SURG/OB UMMC

## 2017-02-05 PROCEDURE — A9270 NON-COVERED ITEM OR SERVICE: HCPCS | Mod: GY | Performed by: PHYSICIAN ASSISTANT

## 2017-02-05 PROCEDURE — 25000132 ZZH RX MED GY IP 250 OP 250 PS 637: Mod: GY

## 2017-02-05 PROCEDURE — 25000128 H RX IP 250 OP 636: Performed by: SURGERY

## 2017-02-05 PROCEDURE — 25000132 ZZH RX MED GY IP 250 OP 250 PS 637: Mod: GY | Performed by: SURGERY

## 2017-02-05 PROCEDURE — 00000146 ZZHCL STATISTIC GLUCOSE BY METER IP

## 2017-02-05 PROCEDURE — A9270 NON-COVERED ITEM OR SERVICE: HCPCS | Mod: GY

## 2017-02-05 PROCEDURE — 36592 COLLECT BLOOD FROM PICC: CPT | Performed by: INTERNAL MEDICINE

## 2017-02-05 PROCEDURE — 99232 SBSQ HOSP IP/OBS MODERATE 35: CPT | Mod: GC | Performed by: INTERNAL MEDICINE

## 2017-02-05 RX ADMIN — CARBAMAZEPINE 200 MG: 100 SUSPENSION ORAL at 13:19

## 2017-02-05 RX ADMIN — Medication 2 G: at 16:37

## 2017-02-05 RX ADMIN — CARBAMAZEPINE 200 MG: 100 SUSPENSION ORAL at 08:54

## 2017-02-05 RX ADMIN — Medication: at 17:01

## 2017-02-05 RX ADMIN — SODIUM CHLORIDE, PRESERVATIVE FREE 5 ML: 5 INJECTION INTRAVENOUS at 13:23

## 2017-02-05 RX ADMIN — Medication 15 ML: at 08:54

## 2017-02-05 RX ADMIN — PANCRELIPASE 48000 UNITS: 24000; 76000; 120000 CAPSULE, DELAYED RELEASE PELLETS ORAL at 04:35

## 2017-02-05 RX ADMIN — Medication 40 MG: at 08:53

## 2017-02-05 RX ADMIN — Medication 1 PACKET: at 19:30

## 2017-02-05 RX ADMIN — SODIUM CHLORIDE, PRESERVATIVE FREE 5 ML: 5 INJECTION INTRAVENOUS at 13:25

## 2017-02-05 RX ADMIN — Medication 6 MG: at 21:15

## 2017-02-05 RX ADMIN — SODIUM CHLORIDE, PRESERVATIVE FREE 5 ML: 5 INJECTION INTRAVENOUS at 16:36

## 2017-02-05 RX ADMIN — POTASSIUM PHOSPHATE, MONOBASIC AND POTASSIUM PHOSPHATE, DIBASIC 18 MMOL: 224; 236 INJECTION, SOLUTION INTRAVENOUS at 13:19

## 2017-02-05 RX ADMIN — Medication 150 MCG: at 08:54

## 2017-02-05 RX ADMIN — SODIUM BICARBONATE 325 MG: 325 TABLET ORAL at 16:36

## 2017-02-05 RX ADMIN — CARBAMAZEPINE 200 MG: 100 SUSPENSION ORAL at 16:37

## 2017-02-05 RX ADMIN — POTASSIUM PHOSPHATE, MONOBASIC AND POTASSIUM PHOSPHATE, DIBASIC 18 MMOL: 224; 236 INJECTION, SOLUTION INTRAVENOUS at 21:15

## 2017-02-05 RX ADMIN — SODIUM BICARBONATE 325 MG: 325 TABLET ORAL at 13:19

## 2017-02-05 RX ADMIN — SODIUM BICARBONATE 325 MG: 325 TABLET ORAL at 00:24

## 2017-02-05 RX ADMIN — Medication 1 PACKET: at 08:55

## 2017-02-05 RX ADMIN — CALCIUM CARBONATE 1250 MG: 1250 SUSPENSION ORAL at 08:55

## 2017-02-05 RX ADMIN — Medication 20 MG: at 11:23

## 2017-02-05 RX ADMIN — PANCRELIPASE 48000 UNITS: 24000; 76000; 120000 CAPSULE, DELAYED RELEASE PELLETS ORAL at 19:30

## 2017-02-05 RX ADMIN — PANCRELIPASE 48000 UNITS: 24000; 76000; 120000 CAPSULE, DELAYED RELEASE PELLETS ORAL at 00:24

## 2017-02-05 RX ADMIN — Medication 100 MCG: at 02:07

## 2017-02-05 RX ADMIN — CALCIUM CARBONATE 1250 MG: 1250 SUSPENSION ORAL at 13:19

## 2017-02-05 RX ADMIN — Medication 10 MG: at 16:37

## 2017-02-05 RX ADMIN — ENOXAPARIN SODIUM 40 MG: 40 INJECTION SUBCUTANEOUS at 11:23

## 2017-02-05 RX ADMIN — Medication 100 MCG: at 08:55

## 2017-02-05 RX ADMIN — PANCRELIPASE 48000 UNITS: 24000; 76000; 120000 CAPSULE, DELAYED RELEASE PELLETS ORAL at 16:35

## 2017-02-05 RX ADMIN — POTASSIUM PHOSPHATE, MONOBASIC AND POTASSIUM PHOSPHATE, DIBASIC 20 MMOL: 224; 236 INJECTION, SOLUTION INTRAVENOUS at 19:31

## 2017-02-05 RX ADMIN — CARBAMAZEPINE 200 MG: 100 SUSPENSION ORAL at 19:30

## 2017-02-05 RX ADMIN — Medication 40 MG: at 19:30

## 2017-02-05 RX ADMIN — CALCIUM CARBONATE 1250 MG: 1250 SUSPENSION ORAL at 17:00

## 2017-02-05 RX ADMIN — Medication 100 MCG: at 17:00

## 2017-02-05 RX ADMIN — SODIUM BICARBONATE 325 MG: 325 TABLET ORAL at 04:35

## 2017-02-05 RX ADMIN — SODIUM BICARBONATE 325 MG: 325 TABLET ORAL at 19:31

## 2017-02-05 RX ADMIN — SODIUM BICARBONATE 325 MG: 325 TABLET ORAL at 08:55

## 2017-02-05 RX ADMIN — PANCRELIPASE 24000 UNITS: 24000; 76000; 120000 CAPSULE, DELAYED RELEASE PELLETS ORAL at 08:54

## 2017-02-05 RX ADMIN — Medication 81 MG: at 08:54

## 2017-02-05 RX ADMIN — POTASSIUM PHOSPHATE, MONOBASIC AND POTASSIUM PHOSPHATE, DIBASIC 18 MMOL: 224; 236 INJECTION, SOLUTION INTRAVENOUS at 11:23

## 2017-02-05 RX ADMIN — SODIUM CHLORIDE, PRESERVATIVE FREE 5 ML: 5 INJECTION INTRAVENOUS at 13:26

## 2017-02-05 RX ADMIN — BRIVARACETAM 50 MG: 50 INJECTION, SUSPENSION INTRAVENOUS at 19:31

## 2017-02-05 RX ADMIN — PANCRELIPASE 24000 UNITS: 24000; 76000; 120000 CAPSULE, DELAYED RELEASE PELLETS ORAL at 13:18

## 2017-02-05 NOTE — PLAN OF CARE
Problem: Goal Outcome Summary  Goal: Goal Outcome Summary  Outcome: No Change  During the Noc shift the patient required to be suctioned x 1 this shift for increase secretions within the trach. O2 sats were observed to within mid to upper 90's throughout the entire shift patient is receiving 40% oxygen via Trach dome. Patient was turned and repositioned every 2 hours. Lerma catheter is clean dry and intact. Rectal tube bypass x 1 this shift although still in place. Currently patient is receiving tube feeding at the rate of 60 ml/hr without issue.

## 2017-02-05 NOTE — PLAN OF CARE
Problem: Goal Outcome Summary  Goal: Goal Outcome Summary  Outcome: Improving    Neuro: Alert and oriented, communicates with thumbs up or thumbs down. Agitated at times when cleaning around scrotal or rectal area, very sore.    CV: HR 80s-90s. BP stable.  Pulm: Trach dome at 40% all day, 02 sats 100%.  GI: TF running through J at goal rate of 60/hr. Multiple loose stools this am, complicating rash around scrotum and rectal area, pain was intolerable during cleaning. Rectal tube placed for patient comfort until loose stools resolve.   : Lerma in place, output 100-150ml/hr  Skin: Yeasty looking rash both legs around scrotum, very, very red escoriated rectal area. Cleaned barrier cream off, covered with powder & left open to air to dry. Rectal tube in place, area much less red by end of shift.      Plan: Possible transfer to floor 6B tomorrow.    Germaine Lancaster RN  [phone]

## 2017-02-05 NOTE — PLAN OF CARE
Problem: Goal Outcome Summary  Goal: Goal Outcome Summary  Outcome: No Change  Patient transferred upstairs around 1730 with RN and NST.  Report called to RN on 5B.  Patient's VSS, no c/o pain.  All belongings sent up with patient.  Patient's mother at bedside and aware of transfer.
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
## 2017-02-05 NOTE — PLAN OF CARE
Problem: Goal Outcome Summary  Goal: Goal Outcome Summary  Outcome: No Change  Pt transferred to floor from 4A at 1730. VSS, trach dome at 40%, pt nonverbal. Tube feeds running through J tube at goal of 60cc/hr, enzymes dissolved Q4 hours in tube feeds. All medications given through J tube. G tube clamped. Rectal tube came out and was reinserted without issue. Lerma catheter intact and draining adequate amounts. Endotracheal suctioning done at 2115. Phosphorus replacement currently infusing. Phos to be rechecked in AM with morning labs.

## 2017-02-05 NOTE — PLAN OF CARE
Problem: Goal Outcome Summary  Goal: Goal Outcome Summary  Outcome: No Change  D: VSS, Afeb. See Epic for full assessment. O2 sats at 96-98 % trach dome. Good productive cough. Nothing for pain this shift. Tried several times to get obtain briviact several times. Not sent. PM's will fallow up on. Tolerating TF at 60 cc/hr. Has yeast rash on groin, continue to use mycostatin powder.  A/P: Stable at present time, continue routine monitoring.     Gissel Dubois R.N.
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
## 2017-02-06 ENCOUNTER — APPOINTMENT (OUTPATIENT)
Dept: CT IMAGING | Facility: CLINIC | Age: 57
DRG: 405 | End: 2017-02-06
Attending: INTERNAL MEDICINE
Payer: MEDICARE

## 2017-02-06 ENCOUNTER — ANESTHESIA EVENT (OUTPATIENT)
Dept: SURGERY | Facility: CLINIC | Age: 57
DRG: 405 | End: 2017-02-06
Payer: MEDICARE

## 2017-02-06 LAB
BACTERIA SPEC CULT: NORMAL
CRP SERPL-MCNC: 87 MG/L (ref 0–8)
ERYTHROCYTE [DISTWIDTH] IN BLOOD BY AUTOMATED COUNT: 20.5 % (ref 10–15)
HCT VFR BLD AUTO: 32 % (ref 40–53)
HGB BLD-MCNC: 9.9 G/DL (ref 13.3–17.7)
LACTATE BLD-SCNC: 0.7 MMOL/L (ref 0.7–2.1)
Lab: NORMAL
MAGNESIUM SERPL-MCNC: 2.6 MG/DL (ref 1.6–2.3)
MCH RBC QN AUTO: 25.4 PG (ref 26.5–33)
MCHC RBC AUTO-ENTMCNC: 30.9 G/DL (ref 31.5–36.5)
MCV RBC AUTO: 82 FL (ref 78–100)
MICRO REPORT STATUS: NORMAL
PHOSPHATE SERPL-MCNC: 2.4 MG/DL (ref 2.5–4.5)
PHOSPHATE SERPL-MCNC: 3.4 MG/DL (ref 2.5–4.5)
PLATELET # BLD AUTO: 404 10E9/L (ref 150–450)
RBC # BLD AUTO: 3.9 10E12/L (ref 4.4–5.9)
SPECIMEN SOURCE: NORMAL
WBC # BLD AUTO: 12.8 10E9/L (ref 4–11)

## 2017-02-06 PROCEDURE — 25000132 ZZH RX MED GY IP 250 OP 250 PS 637: Mod: GY | Performed by: NURSE PRACTITIONER

## 2017-02-06 PROCEDURE — A9270 NON-COVERED ITEM OR SERVICE: HCPCS | Mod: GY | Performed by: NURSE PRACTITIONER

## 2017-02-06 PROCEDURE — 25000132 ZZH RX MED GY IP 250 OP 250 PS 637: Mod: GY

## 2017-02-06 PROCEDURE — A9270 NON-COVERED ITEM OR SERVICE: HCPCS | Mod: GY | Performed by: PHYSICIAN ASSISTANT

## 2017-02-06 PROCEDURE — 40000983 ZZH STATISTIC HFNC ADULT NON-CPAP

## 2017-02-06 PROCEDURE — A9270 NON-COVERED ITEM OR SERVICE: HCPCS | Mod: GY

## 2017-02-06 PROCEDURE — 25000125 ZZHC RX 250: Performed by: NURSE PRACTITIONER

## 2017-02-06 PROCEDURE — 36415 COLL VENOUS BLD VENIPUNCTURE: CPT | Performed by: INTERNAL MEDICINE

## 2017-02-06 PROCEDURE — 40000558 ZZH STATISTIC CVC DRESSING CHANGE

## 2017-02-06 PROCEDURE — 86140 C-REACTIVE PROTEIN: CPT | Performed by: INTERNAL MEDICINE

## 2017-02-06 PROCEDURE — 27210436 ZZH NUTRITION PRODUCT SEMIELEM INTERMED CAN

## 2017-02-06 PROCEDURE — 25000125 ZZHC RX 250: Performed by: SURGERY

## 2017-02-06 PROCEDURE — 99232 SBSQ HOSP IP/OBS MODERATE 35: CPT | Mod: GC | Performed by: INTERNAL MEDICINE

## 2017-02-06 PROCEDURE — 83605 ASSAY OF LACTIC ACID: CPT | Performed by: INTERNAL MEDICINE

## 2017-02-06 PROCEDURE — 25000132 ZZH RX MED GY IP 250 OP 250 PS 637: Mod: GY | Performed by: PHYSICIAN ASSISTANT

## 2017-02-06 PROCEDURE — 85027 COMPLETE CBC AUTOMATED: CPT | Performed by: INTERNAL MEDICINE

## 2017-02-06 PROCEDURE — 84100 ASSAY OF PHOSPHORUS: CPT | Performed by: INTERNAL MEDICINE

## 2017-02-06 PROCEDURE — 12000001 ZZH R&B MED SURG/OB UMMC

## 2017-02-06 PROCEDURE — 25000125 ZZHC RX 250

## 2017-02-06 PROCEDURE — 25000125 ZZHC RX 250: Performed by: STUDENT IN AN ORGANIZED HEALTH CARE EDUCATION/TRAINING PROGRAM

## 2017-02-06 PROCEDURE — 36592 COLLECT BLOOD FROM PICC: CPT | Performed by: INTERNAL MEDICINE

## 2017-02-06 PROCEDURE — 74177 CT ABD & PELVIS W/CONTRAST: CPT

## 2017-02-06 PROCEDURE — 25000132 ZZH RX MED GY IP 250 OP 250 PS 637: Mod: GY | Performed by: SURGERY

## 2017-02-06 PROCEDURE — 25500064 ZZH RX 255 OP 636: Performed by: STUDENT IN AN ORGANIZED HEALTH CARE EDUCATION/TRAINING PROGRAM

## 2017-02-06 PROCEDURE — A9270 NON-COVERED ITEM OR SERVICE: HCPCS | Mod: GY | Performed by: SURGERY

## 2017-02-06 PROCEDURE — 25000128 H RX IP 250 OP 636: Performed by: SURGERY

## 2017-02-06 PROCEDURE — 83735 ASSAY OF MAGNESIUM: CPT | Performed by: INTERNAL MEDICINE

## 2017-02-06 RX ORDER — IOPAMIDOL 755 MG/ML
93 INJECTION, SOLUTION INTRAVASCULAR ONCE
Status: COMPLETED | OUTPATIENT
Start: 2017-02-06 | End: 2017-02-06

## 2017-02-06 RX ADMIN — Medication 40 MG: at 22:16

## 2017-02-06 RX ADMIN — BRIVARACETAM 50 MG: 50 INJECTION, SUSPENSION INTRAVENOUS at 22:20

## 2017-02-06 RX ADMIN — Medication 20 MG: at 08:16

## 2017-02-06 RX ADMIN — PANCRELIPASE 48000 UNITS: 24000; 76000; 120000 CAPSULE, DELAYED RELEASE PELLETS ORAL at 00:45

## 2017-02-06 RX ADMIN — Medication 100 MCG: at 13:09

## 2017-02-06 RX ADMIN — BRIVARACETAM 50 MG: 50 INJECTION, SUSPENSION INTRAVENOUS at 08:16

## 2017-02-06 RX ADMIN — Medication 100 MCG: at 17:27

## 2017-02-06 RX ADMIN — Medication 40 MG: at 08:16

## 2017-02-06 RX ADMIN — Medication 81 MG: at 08:15

## 2017-02-06 RX ADMIN — CALCIUM CARBONATE 1250 MG: 1250 SUSPENSION ORAL at 13:10

## 2017-02-06 RX ADMIN — ENOXAPARIN SODIUM 40 MG: 40 INJECTION SUBCUTANEOUS at 08:16

## 2017-02-06 RX ADMIN — Medication 150 MCG: at 08:16

## 2017-02-06 RX ADMIN — SODIUM BICARBONATE 325 MG: 325 TABLET ORAL at 08:12

## 2017-02-06 RX ADMIN — Medication 15 ML: at 08:15

## 2017-02-06 RX ADMIN — Medication 1 PACKET: at 22:29

## 2017-02-06 RX ADMIN — CARBAMAZEPINE 200 MG: 100 SUSPENSION ORAL at 22:16

## 2017-02-06 RX ADMIN — IOPAMIDOL 93 ML: 755 INJECTION, SOLUTION INTRAVENOUS at 12:31

## 2017-02-06 RX ADMIN — PANCRELIPASE 48000 UNITS: 24000; 76000; 120000 CAPSULE, DELAYED RELEASE PELLETS ORAL at 04:23

## 2017-02-06 RX ADMIN — Medication 1 PACKET: at 08:11

## 2017-02-06 RX ADMIN — SODIUM BICARBONATE 325 MG: 325 TABLET ORAL at 04:23

## 2017-02-06 RX ADMIN — SODIUM CHLORIDE, PRESERVATIVE FREE 5 ML: 5 INJECTION INTRAVENOUS at 22:29

## 2017-02-06 RX ADMIN — Medication 10 MG: at 17:28

## 2017-02-06 RX ADMIN — SODIUM BICARBONATE 325 MG: 325 TABLET ORAL at 17:27

## 2017-02-06 RX ADMIN — Medication 100 MCG: at 02:33

## 2017-02-06 RX ADMIN — CARBAMAZEPINE 200 MG: 100 SUSPENSION ORAL at 17:27

## 2017-02-06 RX ADMIN — SODIUM CHLORIDE, PRESERVATIVE FREE 5 ML: 5 INJECTION INTRAVENOUS at 08:09

## 2017-02-06 RX ADMIN — Medication 6 MG: at 22:17

## 2017-02-06 RX ADMIN — CARBAMAZEPINE 200 MG: 100 SUSPENSION ORAL at 08:16

## 2017-02-06 RX ADMIN — POTASSIUM PHOSPHATE, MONOBASIC AND POTASSIUM PHOSPHATE, DIBASIC 15 MMOL: 224; 236 INJECTION, SOLUTION INTRAVENOUS at 23:05

## 2017-02-06 RX ADMIN — PANCRELIPASE 48000 UNITS: 24000; 76000; 120000 CAPSULE, DELAYED RELEASE PELLETS ORAL at 13:10

## 2017-02-06 RX ADMIN — CALCIUM CARBONATE 1250 MG: 1250 SUSPENSION ORAL at 08:15

## 2017-02-06 RX ADMIN — POTASSIUM PHOSPHATE, MONOBASIC AND POTASSIUM PHOSPHATE, DIBASIC 18 MMOL: 224; 236 INJECTION, SOLUTION INTRAVENOUS at 14:44

## 2017-02-06 RX ADMIN — POTASSIUM PHOSPHATE, MONOBASIC AND POTASSIUM PHOSPHATE, DIBASIC 18 MMOL: 224; 236 INJECTION, SOLUTION INTRAVENOUS at 23:11

## 2017-02-06 RX ADMIN — SODIUM BICARBONATE 325 MG: 325 TABLET ORAL at 00:45

## 2017-02-06 RX ADMIN — PANCRELIPASE 48000 UNITS: 24000; 76000; 120000 CAPSULE, DELAYED RELEASE PELLETS ORAL at 08:12

## 2017-02-06 RX ADMIN — CALCIUM CARBONATE 1250 MG: 1250 SUSPENSION ORAL at 17:28

## 2017-02-06 RX ADMIN — SODIUM BICARBONATE 325 MG: 325 TABLET ORAL at 13:09

## 2017-02-06 RX ADMIN — PANCRELIPASE 48000 UNITS: 24000; 76000; 120000 CAPSULE, DELAYED RELEASE PELLETS ORAL at 17:26

## 2017-02-06 RX ADMIN — SODIUM CHLORIDE, PRESERVATIVE FREE 5 ML: 5 INJECTION INTRAVENOUS at 02:23

## 2017-02-06 RX ADMIN — Medication 1 PACKET: at 14:44

## 2017-02-06 RX ADMIN — SODIUM CHLORIDE, PRESERVATIVE FREE 73 ML: 5 INJECTION INTRAVENOUS at 12:31

## 2017-02-06 RX ADMIN — CARBAMAZEPINE 200 MG: 100 SUSPENSION ORAL at 13:10

## 2017-02-06 RX ADMIN — POTASSIUM PHOSPHATE, MONOBASIC AND POTASSIUM PHOSPHATE, DIBASIC 18 MMOL: 224; 236 INJECTION, SOLUTION INTRAVENOUS at 08:15

## 2017-02-06 ASSESSMENT — ENCOUNTER SYMPTOMS: SEIZURES: 1

## 2017-02-06 NOTE — PLAN OF CARE
Problem: Goal Outcome Summary  Goal: Goal Outcome Summary  Outcome: No Change  VSS, pt on trach dome at 40%, nonverbal. Tube feeds running at goal of 60cc/hr through J tube, G tube clamped. Rectal tube removed and reinserted, draining adequately. Lerma patent and draining adequate amounts. Medications given through J tube. Desitin applied to reddened bottom, topical morphine also available PRN for bottom. Trach suctioned x 2. Magnesium replaced, phosphorus replacement currently infusing in left PICC line. Frequent rounding done to ensure pt not pulling at rectal tube, bed alarm on for safety.
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
## 2017-02-06 NOTE — PLAN OF CARE
Problem: Pancreatitis, Acute/Chronic (Adult)  Goal: Signs and Symptoms of Listed Potential Problems Will be Absent or Manageable (Pancreatitis, Acute/Chronic)  Signs and symptoms of listed potential problems will be absent or manageable by discharge/transition of care (reference Pancreatitis, Acute/Chronic (Adult) CPG).   Assumed care at 11:00. Patient suctioned once since then. Thick yellow sputum. Had Abd CT today, will be going for necrosectomy, tomorrow . TF will be off at midnight..Spouse was here earlier. Is aware of time of procedure. Routine pre-procedure cares..

## 2017-02-06 NOTE — PROGRESS NOTES
2/6/2017 Gastroenterology Brief Note    Chart reviewed  Repeat CT scan with IV contrast today showing increased size of fluid collection along inferior/posterior margin of stomach. Tmax 99.2, patient asymptomatic, CRP trending down.    Planning for EGD with necrosectomy tomorrow. Possibly will need further drainage (new stent placement) which would require EUS at the same time. Another option would be for percutaneous drainage of the above mentioned fluid collection and I have discussed with IR who will look at images.    NPO at midnight, hold AM Lovenox  Check INR, plt in AM      Above in collaboration with Dr. Guru Johnathon Lofton PA-C  Therapeutic Endoscopy/Pancreaticobiliary Service  Pager *[phone]

## 2017-02-06 NOTE — PLAN OF CARE
Problem: Goal Outcome Summary  Goal: Goal Outcome Summary  Throughout the Noc shift the patient received tube feeding at the rate of 60 ml/hr without issue via G/J tube. Patient removed his rectal tube this staff nurse opted not to replaced the rectal tube per patient head nodding. Lerma catheter patent and intact. Trach dome is oxygen level at 40% suctioned x 2 o2 sats upper 90's.
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
## 2017-02-07 ENCOUNTER — APPOINTMENT (OUTPATIENT)
Dept: GENERAL RADIOLOGY | Facility: CLINIC | Age: 57
DRG: 405 | End: 2017-02-07
Attending: INTERNAL MEDICINE
Payer: MEDICARE

## 2017-02-07 ENCOUNTER — ANESTHESIA (OUTPATIENT)
Dept: SURGERY | Facility: CLINIC | Age: 57
DRG: 405 | End: 2017-02-07
Payer: MEDICARE

## 2017-02-07 LAB
BUN SERPL-MCNC: 20 MG/DL (ref 7–30)
CALCIUM SERPL-MCNC: 7.7 MG/DL (ref 8.5–10.1)
CHLORIDE SERPL-SCNC: 100 MMOL/L (ref 94–109)
CO2 SERPL-SCNC: 26 MMOL/L (ref 20–32)
CREAT SERPL-MCNC: 0.69 MG/DL (ref 0.66–1.25)
ERYTHROCYTE [DISTWIDTH] IN BLOOD BY AUTOMATED COUNT: 20.5 % (ref 10–15)
GFR SERPL CREATININE-BSD FRML MDRD: NORMAL ML/MIN/1.7M2
GLUCOSE BLDC GLUCOMTR-MCNC: 96 MG/DL (ref 70–99)
GLUCOSE BLDC GLUCOMTR-MCNC: 97 MG/DL (ref 70–99)
GLUCOSE SERPL-MCNC: 85 MG/DL (ref 70–99)
HCT VFR BLD AUTO: 31.8 % (ref 40–53)
HGB BLD-MCNC: 9.4 G/DL (ref 13.3–17.7)
INR PPP: 1.27 (ref 0.86–1.14)
MCH RBC QN AUTO: 24.4 PG (ref 26.5–33)
MCHC RBC AUTO-ENTMCNC: 29.6 G/DL (ref 31.5–36.5)
MCV RBC AUTO: 82 FL (ref 78–100)
PHOSPHATE SERPL-MCNC: 4.3 MG/DL (ref 2.5–4.5)
PLATELET # BLD AUTO: 363 10E9/L (ref 150–450)
POTASSIUM SERPL-SCNC: 4.2 MMOL/L (ref 3.4–5.3)
RBC # BLD AUTO: 3.86 10E12/L (ref 4.4–5.9)
SODIUM SERPL-SCNC: 135 MMOL/L (ref 133–144)
WBC # BLD AUTO: 11.7 10E9/L (ref 4–11)

## 2017-02-07 PROCEDURE — 25000132 ZZH RX MED GY IP 250 OP 250 PS 637: Mod: GY | Performed by: NURSE PRACTITIONER

## 2017-02-07 PROCEDURE — 27210995 ZZH RX 272: Performed by: INTERNAL MEDICINE

## 2017-02-07 PROCEDURE — A9270 NON-COVERED ITEM OR SERVICE: HCPCS | Mod: GY | Performed by: NURSE PRACTITIONER

## 2017-02-07 PROCEDURE — C1769 GUIDE WIRE: HCPCS | Performed by: INTERNAL MEDICINE

## 2017-02-07 PROCEDURE — 25000132 ZZH RX MED GY IP 250 OP 250 PS 637: Mod: GY

## 2017-02-07 PROCEDURE — C1725 CATH, TRANSLUMIN NON-LASER: HCPCS | Performed by: INTERNAL MEDICINE

## 2017-02-07 PROCEDURE — 40000275 ZZH STATISTIC RCP TIME EA 10 MIN

## 2017-02-07 PROCEDURE — 12000008 ZZH R&B INTERMEDIATE UMMC

## 2017-02-07 PROCEDURE — 25000132 ZZH RX MED GY IP 250 OP 250 PS 637: Mod: GY | Performed by: PHYSICIAN ASSISTANT

## 2017-02-07 PROCEDURE — 25000125 ZZHC RX 250

## 2017-02-07 PROCEDURE — 25000566 ZZH SEVOFLURANE, EA 15 MIN: Performed by: INTERNAL MEDICINE

## 2017-02-07 PROCEDURE — 40000047 ZZH STATISTIC CTO2 CONT OXYGEN TECH TIME EA 90 MIN

## 2017-02-07 PROCEDURE — A9270 NON-COVERED ITEM OR SERVICE: HCPCS | Mod: GY | Performed by: PHYSICIAN ASSISTANT

## 2017-02-07 PROCEDURE — 0F9G3ZZ DRAINAGE OF PANCREAS, PERCUTANEOUS APPROACH: ICD-10-PCS | Performed by: INTERNAL MEDICINE

## 2017-02-07 PROCEDURE — 71000014 ZZH RECOVERY PHASE 1 LEVEL 2 FIRST HR: Performed by: INTERNAL MEDICINE

## 2017-02-07 PROCEDURE — 84100 ASSAY OF PHOSPHORUS: CPT | Performed by: INTERNAL MEDICINE

## 2017-02-07 PROCEDURE — 25000132 ZZH RX MED GY IP 250 OP 250 PS 637: Mod: GY | Performed by: INTERNAL MEDICINE

## 2017-02-07 PROCEDURE — 80048 BASIC METABOLIC PNL TOTAL CA: CPT | Performed by: INTERNAL MEDICINE

## 2017-02-07 PROCEDURE — 27210794 ZZH OR GENERAL SUPPLY STERILE: Performed by: INTERNAL MEDICINE

## 2017-02-07 PROCEDURE — 85610 PROTHROMBIN TIME: CPT | Performed by: INTERNAL MEDICINE

## 2017-02-07 PROCEDURE — 99233 SBSQ HOSP IP/OBS HIGH 50: CPT | Mod: GC | Performed by: INTERNAL MEDICINE

## 2017-02-07 PROCEDURE — 0F7D3DZ DILATION OF PANCREATIC DUCT WITH INTRALUMINAL DEVICE, PERCUTANEOUS APPROACH: ICD-10-PCS | Performed by: INTERNAL MEDICINE

## 2017-02-07 PROCEDURE — 37000009 ZZH ANESTHESIA TECHNICAL FEE, EACH ADDTL 15 MIN: Performed by: INTERNAL MEDICINE

## 2017-02-07 PROCEDURE — 40000170 ZZH STATISTIC PRE-PROCEDURE ASSESSMENT II: Performed by: INTERNAL MEDICINE

## 2017-02-07 PROCEDURE — 25000132 ZZH RX MED GY IP 250 OP 250 PS 637: Mod: GY | Performed by: SURGERY

## 2017-02-07 PROCEDURE — C1876 STENT, NON-COA/NON-COV W/DEL: HCPCS | Performed by: INTERNAL MEDICINE

## 2017-02-07 PROCEDURE — 36000059 ZZH SURGERY LEVEL 3 EA 15 ADDTL MIN UMMC: Performed by: INTERNAL MEDICINE

## 2017-02-07 PROCEDURE — 37000008 ZZH ANESTHESIA TECHNICAL FEE, 1ST 30 MIN: Performed by: INTERNAL MEDICINE

## 2017-02-07 PROCEDURE — 40000277 XR SURGERY CARM FLUORO LESS THAN 5 MIN W STILLS: Mod: TC

## 2017-02-07 PROCEDURE — 36415 COLL VENOUS BLD VENIPUNCTURE: CPT | Performed by: INTERNAL MEDICINE

## 2017-02-07 PROCEDURE — 25500064 ZZH RX 255 OP 636: Performed by: INTERNAL MEDICINE

## 2017-02-07 PROCEDURE — 25000125 ZZHC RX 250: Performed by: SURGERY

## 2017-02-07 PROCEDURE — 27210436 ZZH NUTRITION PRODUCT SEMIELEM INTERMED CAN

## 2017-02-07 PROCEDURE — 36000057 ZZH SURGERY LEVEL 3 1ST 30 MIN - UMMC: Performed by: INTERNAL MEDICINE

## 2017-02-07 PROCEDURE — 25000128 H RX IP 250 OP 636: Performed by: ANESTHESIOLOGY

## 2017-02-07 PROCEDURE — A9270 NON-COVERED ITEM OR SERVICE: HCPCS | Mod: GY | Performed by: SURGERY

## 2017-02-07 PROCEDURE — 25800025 ZZH RX 258: Performed by: ANESTHESIOLOGY

## 2017-02-07 PROCEDURE — 00000146 ZZHCL STATISTIC GLUCOSE BY METER IP

## 2017-02-07 PROCEDURE — 25000125 ZZHC RX 250: Performed by: ANESTHESIOLOGY

## 2017-02-07 PROCEDURE — 85027 COMPLETE CBC AUTOMATED: CPT | Performed by: INTERNAL MEDICINE

## 2017-02-07 DEVICE — STENT SOLUS BILIARY 10FRX03CM DBL PIGTAIL W/INTRO G25670: Type: IMPLANTABLE DEVICE | Site: STOMACH | Status: FUNCTIONAL

## 2017-02-07 RX ORDER — LIDOCAINE 40 MG/G
CREAM TOPICAL
Status: DISCONTINUED | OUTPATIENT
Start: 2017-02-07 | End: 2017-02-07 | Stop reason: HOSPADM

## 2017-02-07 RX ORDER — SODIUM CHLORIDE, SODIUM LACTATE, POTASSIUM CHLORIDE, CALCIUM CHLORIDE 600; 310; 30; 20 MG/100ML; MG/100ML; MG/100ML; MG/100ML
INJECTION, SOLUTION INTRAVENOUS CONTINUOUS PRN
Status: DISCONTINUED | OUTPATIENT
Start: 2017-02-07 | End: 2017-02-07

## 2017-02-07 RX ORDER — EPHEDRINE SULFATE 50 MG/ML
INJECTION, SOLUTION INTRAMUSCULAR; INTRAVENOUS; SUBCUTANEOUS PRN
Status: DISCONTINUED | OUTPATIENT
Start: 2017-02-07 | End: 2017-02-07

## 2017-02-07 RX ORDER — ONDANSETRON 2 MG/ML
4 INJECTION INTRAMUSCULAR; INTRAVENOUS EVERY 30 MIN PRN
Status: DISCONTINUED | OUTPATIENT
Start: 2017-02-07 | End: 2017-02-07 | Stop reason: HOSPADM

## 2017-02-07 RX ORDER — ONDANSETRON 4 MG/1
4 TABLET, ORALLY DISINTEGRATING ORAL EVERY 30 MIN PRN
Status: DISCONTINUED | OUTPATIENT
Start: 2017-02-07 | End: 2017-02-07 | Stop reason: HOSPADM

## 2017-02-07 RX ORDER — SODIUM CHLORIDE, SODIUM LACTATE, POTASSIUM CHLORIDE, CALCIUM CHLORIDE 600; 310; 30; 20 MG/100ML; MG/100ML; MG/100ML; MG/100ML
INJECTION, SOLUTION INTRAVENOUS CONTINUOUS
Status: DISCONTINUED | OUTPATIENT
Start: 2017-02-07 | End: 2017-02-07 | Stop reason: HOSPADM

## 2017-02-07 RX ORDER — FENTANYL CITRATE 50 UG/ML
INJECTION, SOLUTION INTRAMUSCULAR; INTRAVENOUS PRN
Status: DISCONTINUED | OUTPATIENT
Start: 2017-02-07 | End: 2017-02-07

## 2017-02-07 RX ORDER — FLUMAZENIL 0.1 MG/ML
0.2 INJECTION, SOLUTION INTRAVENOUS
Status: ACTIVE | OUTPATIENT
Start: 2017-02-07 | End: 2017-02-08

## 2017-02-07 RX ORDER — FENTANYL CITRATE 50 UG/ML
25-50 INJECTION, SOLUTION INTRAMUSCULAR; INTRAVENOUS EVERY 5 MIN PRN
Status: DISCONTINUED | OUTPATIENT
Start: 2017-02-07 | End: 2017-02-07 | Stop reason: HOSPADM

## 2017-02-07 RX ORDER — IOPAMIDOL 510 MG/ML
INJECTION, SOLUTION INTRAVASCULAR PRN
Status: DISCONTINUED | OUTPATIENT
Start: 2017-02-07 | End: 2017-02-07 | Stop reason: HOSPADM

## 2017-02-07 RX ORDER — NALOXONE HYDROCHLORIDE 0.4 MG/ML
.1-.4 INJECTION, SOLUTION INTRAMUSCULAR; INTRAVENOUS; SUBCUTANEOUS
Status: ACTIVE | OUTPATIENT
Start: 2017-02-07 | End: 2017-02-08

## 2017-02-07 RX ADMIN — Medication 100 MCG: at 01:17

## 2017-02-07 RX ADMIN — Medication 5 MG: at 13:56

## 2017-02-07 RX ADMIN — PANCRELIPASE 48000 UNITS: 24000; 76000; 120000 CAPSULE, DELAYED RELEASE PELLETS ORAL at 18:06

## 2017-02-07 RX ADMIN — PHENYLEPHRINE HYDROCHLORIDE 100 MCG: 10 INJECTION, SOLUTION INTRAMUSCULAR; INTRAVENOUS; SUBCUTANEOUS at 13:35

## 2017-02-07 RX ADMIN — PHENYLEPHRINE HYDROCHLORIDE 100 MCG: 10 INJECTION, SOLUTION INTRAMUSCULAR; INTRAVENOUS; SUBCUTANEOUS at 14:08

## 2017-02-07 RX ADMIN — POTASSIUM PHOSPHATE, MONOBASIC AND POTASSIUM PHOSPHATE, DIBASIC 18 MMOL: 224; 236 INJECTION, SOLUTION INTRAVENOUS at 18:08

## 2017-02-07 RX ADMIN — CARBAMAZEPINE 200 MG: 100 SUSPENSION ORAL at 09:23

## 2017-02-07 RX ADMIN — Medication 5 MG: at 14:20

## 2017-02-07 RX ADMIN — SODIUM CHLORIDE, POTASSIUM CHLORIDE, SODIUM LACTATE AND CALCIUM CHLORIDE: 600; 310; 30; 20 INJECTION, SOLUTION INTRAVENOUS at 12:55

## 2017-02-07 RX ADMIN — PHENYLEPHRINE HYDROCHLORIDE 100 MCG: 10 INJECTION, SOLUTION INTRAMUSCULAR; INTRAVENOUS; SUBCUTANEOUS at 13:50

## 2017-02-07 RX ADMIN — PHENYLEPHRINE HYDROCHLORIDE 100 MCG: 10 INJECTION, SOLUTION INTRAMUSCULAR; INTRAVENOUS; SUBCUTANEOUS at 13:40

## 2017-02-07 RX ADMIN — Medication 6 MG: at 21:17

## 2017-02-07 RX ADMIN — Medication 40 MG: at 21:17

## 2017-02-07 RX ADMIN — BRIVARACETAM 50 MG: 50 INJECTION, SUSPENSION INTRAVENOUS at 21:17

## 2017-02-07 RX ADMIN — PHENYLEPHRINE HYDROCHLORIDE 100 MCG: 10 INJECTION, SOLUTION INTRAMUSCULAR; INTRAVENOUS; SUBCUTANEOUS at 13:56

## 2017-02-07 RX ADMIN — Medication 5 MG: at 13:40

## 2017-02-07 RX ADMIN — Medication 10 MG: at 18:08

## 2017-02-07 RX ADMIN — CARBAMAZEPINE 200 MG: 100 SUSPENSION ORAL at 21:17

## 2017-02-07 RX ADMIN — CALCIUM CARBONATE 1250 MG: 1250 SUSPENSION ORAL at 18:08

## 2017-02-07 RX ADMIN — PHENYLEPHRINE HYDROCHLORIDE 100 MCG: 10 INJECTION, SOLUTION INTRAMUSCULAR; INTRAVENOUS; SUBCUTANEOUS at 14:19

## 2017-02-07 RX ADMIN — Medication 5 MG: at 13:32

## 2017-02-07 RX ADMIN — CARBAMAZEPINE 200 MG: 100 SUSPENSION ORAL at 18:08

## 2017-02-07 RX ADMIN — SODIUM BICARBONATE 325 MG: 325 TABLET ORAL at 18:10

## 2017-02-07 RX ADMIN — SODIUM CHLORIDE, PRESERVATIVE FREE 15 ML: 5 INJECTION INTRAVENOUS at 18:07

## 2017-02-07 RX ADMIN — Medication 20 MG: at 09:23

## 2017-02-07 RX ADMIN — Medication 1 PACKET: at 21:17

## 2017-02-07 RX ADMIN — FENTANYL CITRATE 50 MCG: 50 INJECTION, SOLUTION INTRAMUSCULAR; INTRAVENOUS at 13:25

## 2017-02-07 RX ADMIN — Medication 150 MCG: at 09:23

## 2017-02-07 RX ADMIN — FENTANYL CITRATE 50 MCG: 50 INJECTION, SOLUTION INTRAMUSCULAR; INTRAVENOUS at 14:03

## 2017-02-07 RX ADMIN — Medication 100 MCG: at 09:23

## 2017-02-07 RX ADMIN — PHENYLEPHRINE HYDROCHLORIDE 100 MCG: 10 INJECTION, SOLUTION INTRAMUSCULAR; INTRAVENOUS; SUBCUTANEOUS at 14:14

## 2017-02-07 RX ADMIN — Medication 100 MCG: at 18:06

## 2017-02-07 RX ADMIN — Medication 5 MG: at 14:08

## 2017-02-07 RX ADMIN — PHENYLEPHRINE HYDROCHLORIDE 100 MCG: 10 INJECTION, SOLUTION INTRAMUSCULAR; INTRAVENOUS; SUBCUTANEOUS at 14:32

## 2017-02-07 RX ADMIN — PHENYLEPHRINE HYDROCHLORIDE 100 MCG: 10 INJECTION, SOLUTION INTRAMUSCULAR; INTRAVENOUS; SUBCUTANEOUS at 13:46

## 2017-02-07 RX ADMIN — PHENYLEPHRINE HYDROCHLORIDE 100 MCG: 10 INJECTION, SOLUTION INTRAMUSCULAR; INTRAVENOUS; SUBCUTANEOUS at 14:24

## 2017-02-07 NOTE — ANESTHESIA CARE TRANSFER NOTE
Patient: Keyon Farias    Procedure(s):  Upper Endoscopy with Necrosectomy, stent placement, cystogastrostomy balloon dilation - Wound Class: II-Clean Contaminated  Endoscopic ultrasound with stent placement - Wound Class: II-Clean Contaminated    Diagnosis: Necrotizing Pancreatits  Diagnosis Additional Information: No value filed.    Anesthesia Type:   Trach, General     Note:  Airway :Tracheostomy  Patient transferred to:PACU  Comments: Anesthesia Care Transfer Note    Patient: Keyon Farias    Transferred to: PACU    Patient vital signs: stable    Airway: none    Monitors on, VSS, pt. Stable, Report given to PACU RN.     Ron Ashraf CRNA  2/7/2017 3:04 PM              Vitals: (Last set prior to Anesthesia Care Transfer)    CRNA VITALS  2/7/2017 1428 - 2/7/2017 1504      2/7/2017             Pulse: 71    SpO2: 100 %    Resp Rate (set): 10                Electronically Signed By: NATHANIEL Del Rio CRNA  February 7, 2017  3:04 PM
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
## 2017-02-07 NOTE — PLAN OF CARE
Problem: Goal Outcome Summary  Goal: Goal Outcome Summary  Outcome: No Change  Trach in place. Suctioned x2, sputum thick, yellow. Pt also able to clear secretions with cough intermittently. Trach cares performed. Lerma cath in place. Pt noted to be holding penis. When asked if having pain at cath site, pt nodded head yes. Lerma cath with adequate, clear yellow urine output. Securement device in place. Lerma cares done. Coccyx reddened, excoriated but blanchable. Redness and excoriation also present on upper, inner thighs. Pt declined to have sacral mepilex applied. Barrier cream applied. Pt plays with continuous pulse ox probe occasionally and sat monitor will alarm. Became frustrated during cares and was hitting at staff, also opened mouth as if to bite staff but did not. Pt requires redirection. TF off, NPO at midnight for necrosectomy tomorrow. Has signed/held orders to be released. Phos replacement currently infusing. Recheck in AM. Continue to assess respiratory status. Monitor for pulling at lines-may need unsecured mitts placed.
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
## 2017-02-07 NOTE — OR NURSING
Dr. Poe here to see Pt.  FSBS at 95.  Dr. Poe aware that Briviact has not been given on 5B due not being available.  Pt. To receive this medication once he has returned to 5B.  Pt. Is calm.  Telephone consent obtained by MD and witnessed by writer.  Pt's mother to be at hospital at 3pm today.

## 2017-02-07 NOTE — PLAN OF CARE
Problem: Goal Outcome Summary  Goal: Goal Outcome Summary  Outcome: No Change  Patient NPO overnight for necrosectomy and EGD today in OR. Both yesterday evening and today, patient was aggressive toward caregivers and took swing at 2 of them(both women). While attempting to wash patient today(pre-op)patient took swing at caregiver. Attempt to wash patient was stopped and patient calmed down. Attempt was made to put protective boots on patient's feet, and he looked angry and made a fist, but did not swing. Once offending boots removed, patient calmed down. MD's were informed about patient's behavior. They saw him before he left for procedure. Otherwise, patient NPO. Only received his most essential AM medications. TF off. Unable to administer Briavaracetam, as it was not available while patient on 5B today. Seizure pads placed on patient's bed, as he is on seizure precautions. Suctioned x1 prior to departure. Patient was picked up around 1115 to go to OR. Routine post-op cares.

## 2017-02-07 NOTE — PLAN OF CARE
"Problem: Goal Outcome Summary  Goal: Goal Outcome Summary  Outcome: No Change  Pt VSS overnight. Slept intermittently between cares. Is NPO for EGD and Necrosectomy at 1245 today. Has #7 Bivona trach. Dressing changed and site care done. Has thick white/yellow secretions. Was suctioned x 4 overnight. Once per RT and 3x per nursing. Sats 100% initially at start of shift on 40% TD. Weaned to 30% and continues to be 100%. Aaron with clear darell urine. Pt occasionally grabs at penis but does not pull at aaron. When asked if having pain pt shakes his head \"no\". Repositioned side to side q 2 hrs. Pt has reddened coccyx with dry skin. Barrier cream applied. No BM overnight. Will continue to monitor pt.         "
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
## 2017-02-07 NOTE — BRIEF OP NOTE
[facility] Brief Operative Note    Pre-operative diagnosis: Necrotizing Pancreatits   Post-operative diagnosis * No post-op diagnosis entered *   Procedure: Procedure(s):  Upper Endoscopy with Necrosectomy, stent placement, cystogastrostomy balloon dilation - Wound Class: II-Clean Contaminated  Endoscopic ultrasound with stent placement - Wound Class: II-Clean Contaminated   Surgeon: Guru Johnathon MD       Estimated blood loss: None    Specimens: None   Findings:    EUS showed a 5.6 cm necrotic collection  EUS guided cystgastrostomy was performed and two 10 Fr by 3 cm stents were placed    Recommenations  No anticoagulation for 3 days  To continue IV antibiotics

## 2017-02-07 NOTE — BRIEF OP NOTE
[facility], [place]    Brief Operative Note    Pre-operative diagnosis: Necrotizing Pancreatits  Post-operative diagnosis * No post-op diagnosis entered *  Procedure: Procedure(s):  Upper Endoscopy with Necrosectomy, stent placement, cystogastrostomy balloon dilation - Wound Class: II-Clean Contaminated  Endoscopic ultrasound with stent placement - Wound Class: II-Clean Contaminated  Surgeon: Surgeon(s) and Role:  Panel 1:     * Jack Marcus MD - Primary     * Melanie Shankar MD    Panel 2:     * Too Thakur MD - Primary     * Guru Jan Diego MD - Assisting  Anesthesia: General   Estimated blood loss: Minimal  Drains: None  Specimens: * No specimens in log *  Findings:   Axios stent removed with forceps. Solus stent remained in place. Guidewire passed through with balloon catheter and contrast injected, showed minimal amount of necrosis. The stent was not removed.   Please refer to EUS report as well.     Complications: None.  Implants: None.

## 2017-02-08 LAB
ALBUMIN SERPL-MCNC: 2 G/DL (ref 3.4–5)
ALP SERPL-CCNC: 59 U/L (ref 40–150)
ALT SERPL W P-5'-P-CCNC: 25 U/L (ref 0–70)
ANION GAP SERPL CALCULATED.3IONS-SCNC: 8 MMOL/L (ref 3–14)
AST SERPL W P-5'-P-CCNC: 17 U/L (ref 0–45)
BILIRUB SERPL-MCNC: 0.6 MG/DL (ref 0.2–1.3)
BUN SERPL-MCNC: 16 MG/DL (ref 7–30)
CALCIUM SERPL-MCNC: 7.6 MG/DL (ref 8.5–10.1)
CHLORIDE SERPL-SCNC: 100 MMOL/L (ref 94–109)
CO2 SERPL-SCNC: 24 MMOL/L (ref 20–32)
CREAT SERPL-MCNC: 0.65 MG/DL (ref 0.66–1.25)
ERYTHROCYTE [DISTWIDTH] IN BLOOD BY AUTOMATED COUNT: 20.1 % (ref 10–15)
GFR SERPL CREATININE-BSD FRML MDRD: ABNORMAL ML/MIN/1.7M2
GLUCOSE BLDC GLUCOMTR-MCNC: 118 MG/DL (ref 70–99)
GLUCOSE SERPL-MCNC: 105 MG/DL (ref 70–99)
HCT VFR BLD AUTO: 29.1 % (ref 40–53)
HGB BLD-MCNC: 8.5 G/DL (ref 13.3–17.7)
MCH RBC QN AUTO: 23.9 PG (ref 26.5–33)
MCHC RBC AUTO-ENTMCNC: 29.2 G/DL (ref 31.5–36.5)
MCV RBC AUTO: 82 FL (ref 78–100)
PLATELET # BLD AUTO: 313 10E9/L (ref 150–450)
POTASSIUM SERPL-SCNC: 3.7 MMOL/L (ref 3.4–5.3)
PROT SERPL-MCNC: 6.4 G/DL (ref 6.8–8.8)
RBC # BLD AUTO: 3.55 10E12/L (ref 4.4–5.9)
SODIUM SERPL-SCNC: 132 MMOL/L (ref 133–144)
WBC # BLD AUTO: 8.8 10E9/L (ref 4–11)

## 2017-02-08 PROCEDURE — A9270 NON-COVERED ITEM OR SERVICE: HCPCS | Mod: GY | Performed by: PHYSICIAN ASSISTANT

## 2017-02-08 PROCEDURE — 25000128 H RX IP 250 OP 636: Performed by: INTERNAL MEDICINE

## 2017-02-08 PROCEDURE — 85027 COMPLETE CBC AUTOMATED: CPT | Performed by: INTERNAL MEDICINE

## 2017-02-08 PROCEDURE — 27210436 ZZH NUTRITION PRODUCT SEMIELEM INTERMED CAN

## 2017-02-08 PROCEDURE — 25000132 ZZH RX MED GY IP 250 OP 250 PS 637: Mod: GY | Performed by: PHYSICIAN ASSISTANT

## 2017-02-08 PROCEDURE — 25000132 ZZH RX MED GY IP 250 OP 250 PS 637: Mod: GY | Performed by: NURSE PRACTITIONER

## 2017-02-08 PROCEDURE — 12000008 ZZH R&B INTERMEDIATE UMMC

## 2017-02-08 PROCEDURE — 00000146 ZZHCL STATISTIC GLUCOSE BY METER IP

## 2017-02-08 PROCEDURE — 25000125 ZZHC RX 250: Performed by: SURGERY

## 2017-02-08 PROCEDURE — A9270 NON-COVERED ITEM OR SERVICE: HCPCS | Mod: GY | Performed by: SURGERY

## 2017-02-08 PROCEDURE — 25000132 ZZH RX MED GY IP 250 OP 250 PS 637: Mod: GY

## 2017-02-08 PROCEDURE — 80053 COMPREHEN METABOLIC PANEL: CPT | Performed by: INTERNAL MEDICINE

## 2017-02-08 PROCEDURE — 99233 SBSQ HOSP IP/OBS HIGH 50: CPT | Mod: GC | Performed by: INTERNAL MEDICINE

## 2017-02-08 PROCEDURE — A9270 NON-COVERED ITEM OR SERVICE: HCPCS | Mod: GY

## 2017-02-08 PROCEDURE — 25000132 ZZH RX MED GY IP 250 OP 250 PS 637: Mod: GY | Performed by: SURGERY

## 2017-02-08 PROCEDURE — 40000047 ZZH STATISTIC CTO2 CONT OXYGEN TECH TIME EA 90 MIN

## 2017-02-08 PROCEDURE — 36592 COLLECT BLOOD FROM PICC: CPT | Performed by: INTERNAL MEDICINE

## 2017-02-08 PROCEDURE — 40000275 ZZH STATISTIC RCP TIME EA 10 MIN

## 2017-02-08 PROCEDURE — A9270 NON-COVERED ITEM OR SERVICE: HCPCS | Mod: GY | Performed by: NURSE PRACTITIONER

## 2017-02-08 PROCEDURE — 25000125 ZZHC RX 250

## 2017-02-08 RX ORDER — CIPROFLOXACIN 2 MG/ML
400 INJECTION, SOLUTION INTRAVENOUS EVERY 12 HOURS
Status: DISCONTINUED | OUTPATIENT
Start: 2017-02-08 | End: 2017-02-10 | Stop reason: HOSPADM

## 2017-02-08 RX ADMIN — Medication 100 MCG: at 08:47

## 2017-02-08 RX ADMIN — PANCRELIPASE 48000 UNITS: 24000; 76000; 120000 CAPSULE, DELAYED RELEASE PELLETS ORAL at 16:03

## 2017-02-08 RX ADMIN — Medication 81 MG: at 08:47

## 2017-02-08 RX ADMIN — PANCRELIPASE 48000 UNITS: 24000; 76000; 120000 CAPSULE, DELAYED RELEASE PELLETS ORAL at 22:00

## 2017-02-08 RX ADMIN — CALCIUM CARBONATE 1250 MG: 1250 SUSPENSION ORAL at 20:49

## 2017-02-08 RX ADMIN — SODIUM BICARBONATE 325 MG: 325 TABLET ORAL at 16:02

## 2017-02-08 RX ADMIN — SODIUM CHLORIDE, PRESERVATIVE FREE 5 ML: 5 INJECTION INTRAVENOUS at 06:37

## 2017-02-08 RX ADMIN — CARBAMAZEPINE 200 MG: 100 SUSPENSION ORAL at 08:47

## 2017-02-08 RX ADMIN — CARBAMAZEPINE 200 MG: 100 SUSPENSION ORAL at 16:03

## 2017-02-08 RX ADMIN — Medication 6 MG: at 20:49

## 2017-02-08 RX ADMIN — SODIUM BICARBONATE 325 MG: 325 TABLET ORAL at 05:10

## 2017-02-08 RX ADMIN — Medication 40 MG: at 20:49

## 2017-02-08 RX ADMIN — POTASSIUM PHOSPHATE, MONOBASIC AND POTASSIUM PHOSPHATE, DIBASIC 18 MMOL: 224; 236 INJECTION, SOLUTION INTRAVENOUS at 08:47

## 2017-02-08 RX ADMIN — Medication 15 ML: at 08:46

## 2017-02-08 RX ADMIN — POTASSIUM PHOSPHATE, MONOBASIC AND POTASSIUM PHOSPHATE, DIBASIC 18 MMOL: 224; 236 INJECTION, SOLUTION INTRAVENOUS at 16:02

## 2017-02-08 RX ADMIN — CALCIUM CARBONATE 1250 MG: 1250 SUSPENSION ORAL at 12:23

## 2017-02-08 RX ADMIN — Medication 10 MG: at 16:05

## 2017-02-08 RX ADMIN — CARBAMAZEPINE 200 MG: 100 SUSPENSION ORAL at 20:49

## 2017-02-08 RX ADMIN — PANCRELIPASE 48000 UNITS: 24000; 76000; 120000 CAPSULE, DELAYED RELEASE PELLETS ORAL at 12:22

## 2017-02-08 RX ADMIN — PANCRELIPASE 24000 UNITS: 24000; 76000; 120000 CAPSULE, DELAYED RELEASE PELLETS ORAL at 00:45

## 2017-02-08 RX ADMIN — CALCIUM CARBONATE 1250 MG: 1250 SUSPENSION ORAL at 08:46

## 2017-02-08 RX ADMIN — Medication 100 MCG: at 20:48

## 2017-02-08 RX ADMIN — PANCRELIPASE 48000 UNITS: 24000; 76000; 120000 CAPSULE, DELAYED RELEASE PELLETS ORAL at 05:10

## 2017-02-08 RX ADMIN — BRIVARACETAM 50 MG: 50 INJECTION, SUSPENSION INTRAVENOUS at 08:47

## 2017-02-08 RX ADMIN — Medication 100 MCG: at 01:03

## 2017-02-08 RX ADMIN — PANCRELIPASE 48000 UNITS: 24000; 76000; 120000 CAPSULE, DELAYED RELEASE PELLETS ORAL at 08:46

## 2017-02-08 RX ADMIN — CIPROFLOXACIN 400 MG: 2 INJECTION, SOLUTION INTRAVENOUS at 16:46

## 2017-02-08 RX ADMIN — Medication 20 MG: at 08:47

## 2017-02-08 RX ADMIN — CARBAMAZEPINE 200 MG: 100 SUSPENSION ORAL at 12:23

## 2017-02-08 RX ADMIN — SODIUM BICARBONATE 325 MG: 325 TABLET ORAL at 12:24

## 2017-02-08 RX ADMIN — Medication 40 MG: at 08:46

## 2017-02-08 RX ADMIN — Medication 1 PACKET: at 16:02

## 2017-02-08 RX ADMIN — POTASSIUM PHOSPHATE, MONOBASIC AND POTASSIUM PHOSPHATE, DIBASIC 18 MMOL: 224; 236 INJECTION, SOLUTION INTRAVENOUS at 01:03

## 2017-02-08 RX ADMIN — SODIUM BICARBONATE 325 MG: 325 TABLET ORAL at 22:00

## 2017-02-08 RX ADMIN — Medication: at 22:55

## 2017-02-08 RX ADMIN — SODIUM CHLORIDE, PRESERVATIVE FREE 5 ML: 5 INJECTION INTRAVENOUS at 20:48

## 2017-02-08 RX ADMIN — Medication 1 PACKET: at 20:48

## 2017-02-08 RX ADMIN — SODIUM BICARBONATE 325 MG: 325 TABLET ORAL at 00:45

## 2017-02-08 RX ADMIN — POTASSIUM CHLORIDE 20 MEQ: 1.5 POWDER, FOR SOLUTION ORAL at 12:24

## 2017-02-08 RX ADMIN — SODIUM BICARBONATE 325 MG: 325 TABLET ORAL at 08:46

## 2017-02-08 RX ADMIN — BRIVARACETAM 50 MG: 50 INJECTION, SUSPENSION INTRAVENOUS at 20:49

## 2017-02-08 RX ADMIN — Medication 1 PACKET: at 08:46

## 2017-02-08 RX ADMIN — Medication 150 MCG: at 08:46

## 2017-02-08 NOTE — PLAN OF CARE
"Problem: Goal Outcome Summary  Goal: Goal Outcome Summary  Outcome: No Change  Pt seems to be A&O. VSS. Pt came back from surgery near beginning of shift. Pt is calm and pleasant for shift. Seemed to express he didn't want trach suctioning but allowed it, suction x1. Patent aaron w/ darell output. Per MD recommendation, TF restarted at 2000 @ 20ml/hr, to increase 10ml/2hr as tolerated to goal of 60ml/hr, now running at 30ml/hr. Pt enjoyed a game of \"thumb war\" with nurse. Will continue to monitor and report changes.  "
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
## 2017-02-08 NOTE — PROGRESS NOTES
GASTROENTEROLOGY PROGRESS NOTE    Date of Admission: 1/23/2017  Reason for Admission: necrotizing pancreatitis      Assessment:  56 year old male with a history of TBI with resultant seizures, aphasia, right sided hemiplegia, v-fib cardiac arrest, panhypopituitarism, and chronic tracheostomy who is transferred from [facility] where he was admitted 1/21 from his LTACH with increasing abd discomfort, fevers and resp distress and was found to have necrotizing pancreatitis based on CT findings of multiple locations of walled off necrosis. He did undergo CT guided aspiration of the largest fluid collection in the pancreatic tail 1/22 in which cultures grew enterobacter cloacae complex + VRE.     Now s/p EUS with cystgastrostomy placement x 2, axios removed and now has three double pig tailed catheters into collections. Fluid from initial cystgastrostomy sent for culture -- mod growth pseudomonas aeruginosa, light growth Enterobacter cloacae, mod growth Candida and mod growth Enterococcus faecium (VRE). Treated with Ertapenem/Meropenem and Linezolid (stopped on 2/3) and clinically has been stable.    EUS drainage 2/7 showing likely infected necrosis - would recommend starting patient back on abx therapy at this time per Dr. Guru Diego.      Recommendations:  Plan for repeat EGD with necrosectomy in ~7 days (not scheduled yet)  Repeat CT scan with IV contrast prior to repeat procedure  Continue PERT   Continue PEG-J TF  Hold AC for 72hrs post procedure   Re-start abx as patient had obvious infected necrosis seen on procedure yesterday      The patient was discussed and plan agreed upon with GI staff, Dr Diego.      Meliza Lofton PA-C   Therapeutic Endoscopy/Pancreaticobiliary [facility]  [facility]  Pager *[phone]  _______________________________________________________________    Interim since last evaluated: Patient seen and examined at 0950. He denies abd pain,  "vitals are stable. WBC decreased.    Objective:  Blood pressure 92/43, pulse 91, temperature 96.1  F (35.6  C), temperature source Oral, resp. rate 18, height 1.651 m (5' 5\"), weight 71.215 kg (157 lb), SpO2 100 %.  General: NAD, cooperative  Abd: Soft, abd nontender        PROCEDURES:  1/22/2017 - CT guided aspiration of necrotic collection ([facility])  1/30/2017 - EUS with cystgastrostomy, hot Axios and solus stent placement  2/7/2017 - EGD with necrosectomy, axios removed and replaced with double pig tailed catheter collection #1    - EUS with placement of 2 double pigtail catheters into collection #2       LABS:  BMP  Recent Labs  Lab 02/01/17  0400 01/31/17  0340 01/30/17  1430  01/30/17  0340  01/29/17  0403    136 135  < > 141  < > 150*   POTASSIUM 3.6 3.8 3.6  --  3.8  < > 3.3*   CHLORIDE 108 106  --   --  108  --  116*   STEVE 6.6* 6.6*  --   --  6.3*  --  6.6*   CO2 24 24  --   --  22  --  24   BUN 20 18  --   --  18  --  19   CR 0.66 0.63*  --   --  0.74  --  0.78   * 142* 136*  --  106*  --  103*   < > = values in this interval not displayed.  CBC    Recent Labs  Lab 02/08/17  0642 02/07/17  0452 02/06/17  0811 02/05/17  1330   WBC 8.8 11.7* 12.8* 12.6*   RBC 3.55* 3.86* 3.90* 3.66*   HGB 8.5* 9.4* 9.9* 9.0*   HCT 29.1* 31.8* 32.0* 30.2*   MCV 82 82 82 83   MCH 23.9* 24.4* 25.4* 24.6*   MCHC 29.2* 29.6* 30.9* 29.8*   RDW 20.1* 20.5* 20.5* 20.9*    363 404 399     INR    Recent Labs  Lab 02/07/17  0452   INR 1.27*     LFTs    Recent Labs  Lab 02/08/17  0642   ALKPHOS 59   AST 17   ALT 25   BILITOTAL 0.6   PROTTOTAL 6.4*   ALBUMIN 2.0*        IMAGING:  Reviewed    "

## 2017-02-08 NOTE — PLAN OF CARE
Problem: Goal Outcome Summary  Goal: Goal Outcome Summary  Outcome: No Change  D/I: Pt is vitally stable. Oriented but non-verbal, however, he can communicates with the hand and with head movements. Follows directions and helps in repositioning. Sats 97-99% on 40% Trach dome.  suction x1. Patent aaron w/ darell output. TF at goal rate of 60ml/hr.  P: Will continue to monitor and care per plan.

## 2017-02-08 NOTE — PLAN OF CARE
Problem: Individualization  Goal: Patient Preferences  Outcome: No Change  Patient calm most of day. He resisted suctioning once by pulling this writer's hand and suction catheter away from him. He appeared to have a mucus plug that was partially obstructing his airway, as he was developing tachypnea with stridor. Sats stayed in upper 90's.  He was suctioned with minimal out once. He still sounded as if he was having difficulty, so he was lavaged and then suctioned, and a moderate amount of thick tan sputum came out. Afterwards, he was breathing effortlessly and gave this RN a thumbs up. TF at 60. Tolerating without nausea. Incontinent of stool x 1. Stool was loose but not watery. Continue TF as ordered. Monitor for fecal incontinence. Suction prn.

## 2017-02-09 ENCOUNTER — APPOINTMENT (OUTPATIENT)
Dept: PHYSICAL THERAPY | Facility: CLINIC | Age: 57
DRG: 405 | End: 2017-02-09
Attending: INTERNAL MEDICINE
Payer: MEDICARE

## 2017-02-09 ENCOUNTER — APPOINTMENT (OUTPATIENT)
Dept: OCCUPATIONAL THERAPY | Facility: CLINIC | Age: 57
DRG: 405 | End: 2017-02-09
Attending: INTERNAL MEDICINE
Payer: MEDICARE

## 2017-02-09 LAB
GLUCOSE BLDC GLUCOMTR-MCNC: 115 MG/DL (ref 70–99)
UPPER EUS: NORMAL
UPPER GI ENDOSCOPY: NORMAL

## 2017-02-09 PROCEDURE — 12000008 ZZH R&B INTERMEDIATE UMMC

## 2017-02-09 PROCEDURE — 00000146 ZZHCL STATISTIC GLUCOSE BY METER IP

## 2017-02-09 PROCEDURE — A9270 NON-COVERED ITEM OR SERVICE: HCPCS | Mod: GY

## 2017-02-09 PROCEDURE — A9270 NON-COVERED ITEM OR SERVICE: HCPCS | Mod: GY | Performed by: PHYSICIAN ASSISTANT

## 2017-02-09 PROCEDURE — 97110 THERAPEUTIC EXERCISES: CPT | Mod: GO

## 2017-02-09 PROCEDURE — A9270 NON-COVERED ITEM OR SERVICE: HCPCS | Mod: GY | Performed by: SURGERY

## 2017-02-09 PROCEDURE — 25000132 ZZH RX MED GY IP 250 OP 250 PS 637: Mod: GY | Performed by: SURGERY

## 2017-02-09 PROCEDURE — 25000132 ZZH RX MED GY IP 250 OP 250 PS 637: Mod: GY

## 2017-02-09 PROCEDURE — 25000132 ZZH RX MED GY IP 250 OP 250 PS 637: Mod: GY | Performed by: PHYSICIAN ASSISTANT

## 2017-02-09 PROCEDURE — 25000132 ZZH RX MED GY IP 250 OP 250 PS 637: Mod: GY | Performed by: NURSE PRACTITIONER

## 2017-02-09 PROCEDURE — A9270 NON-COVERED ITEM OR SERVICE: HCPCS | Mod: GY | Performed by: NURSE PRACTITIONER

## 2017-02-09 PROCEDURE — 40000193 ZZH STATISTIC PT WARD VISIT

## 2017-02-09 PROCEDURE — 40000047 ZZH STATISTIC CTO2 CONT OXYGEN TECH TIME EA 90 MIN

## 2017-02-09 PROCEDURE — 40000133 ZZH STATISTIC OT WARD VISIT

## 2017-02-09 PROCEDURE — 25000128 H RX IP 250 OP 636: Performed by: INTERNAL MEDICINE

## 2017-02-09 PROCEDURE — 40000802 ZZH SITE CHECK

## 2017-02-09 PROCEDURE — 97110 THERAPEUTIC EXERCISES: CPT | Mod: GP

## 2017-02-09 PROCEDURE — 97530 THERAPEUTIC ACTIVITIES: CPT | Mod: GP

## 2017-02-09 PROCEDURE — 99233 SBSQ HOSP IP/OBS HIGH 50: CPT | Mod: GC | Performed by: INTERNAL MEDICINE

## 2017-02-09 PROCEDURE — 27210436 ZZH NUTRITION PRODUCT SEMIELEM INTERMED CAN

## 2017-02-09 PROCEDURE — 25000125 ZZHC RX 250

## 2017-02-09 RX ADMIN — PANCRELIPASE 24000 UNITS: 24000; 76000; 120000 CAPSULE, DELAYED RELEASE PELLETS ORAL at 09:33

## 2017-02-09 RX ADMIN — Medication 20 MG: at 09:35

## 2017-02-09 RX ADMIN — Medication 1 PACKET: at 13:02

## 2017-02-09 RX ADMIN — Medication 40 MG: at 09:36

## 2017-02-09 RX ADMIN — SODIUM BICARBONATE 325 MG: 325 TABLET ORAL at 09:38

## 2017-02-09 RX ADMIN — PANCRELIPASE 48000 UNITS: 24000; 76000; 120000 CAPSULE, DELAYED RELEASE PELLETS ORAL at 20:50

## 2017-02-09 RX ADMIN — Medication 100 MCG: at 00:38

## 2017-02-09 RX ADMIN — Medication 81 MG: at 09:33

## 2017-02-09 RX ADMIN — CALCIUM CARBONATE 1250 MG: 1250 SUSPENSION ORAL at 09:36

## 2017-02-09 RX ADMIN — Medication 6 MG: at 20:49

## 2017-02-09 RX ADMIN — SODIUM BICARBONATE 325 MG: 325 TABLET ORAL at 16:33

## 2017-02-09 RX ADMIN — CALCIUM CARBONATE 1250 MG: 1250 SUSPENSION ORAL at 16:33

## 2017-02-09 RX ADMIN — CARBAMAZEPINE 200 MG: 100 SUSPENSION ORAL at 16:33

## 2017-02-09 RX ADMIN — PANCRELIPASE 48000 UNITS: 24000; 76000; 120000 CAPSULE, DELAYED RELEASE PELLETS ORAL at 16:32

## 2017-02-09 RX ADMIN — CARBAMAZEPINE 200 MG: 100 SUSPENSION ORAL at 20:49

## 2017-02-09 RX ADMIN — BRIVARACETAM 50 MG: 50 INJECTION, SUSPENSION INTRAVENOUS at 09:38

## 2017-02-09 RX ADMIN — Medication 15 ML: at 09:34

## 2017-02-09 RX ADMIN — CARBAMAZEPINE 200 MG: 100 SUSPENSION ORAL at 09:34

## 2017-02-09 RX ADMIN — Medication 40 MG: at 20:51

## 2017-02-09 RX ADMIN — SODIUM BICARBONATE 325 MG: 325 TABLET ORAL at 00:38

## 2017-02-09 RX ADMIN — CARBAMAZEPINE 200 MG: 100 SUSPENSION ORAL at 12:58

## 2017-02-09 RX ADMIN — SODIUM BICARBONATE 325 MG: 325 TABLET ORAL at 12:57

## 2017-02-09 RX ADMIN — SODIUM BICARBONATE 325 MG: 325 TABLET ORAL at 04:58

## 2017-02-09 RX ADMIN — CIPROFLOXACIN 400 MG: 2 INJECTION, SOLUTION INTRAVENOUS at 16:32

## 2017-02-09 RX ADMIN — Medication 150 MCG: at 09:36

## 2017-02-09 RX ADMIN — Medication 10 MG: at 16:33

## 2017-02-09 RX ADMIN — PANCRELIPASE 48000 UNITS: 24000; 76000; 120000 CAPSULE, DELAYED RELEASE PELLETS ORAL at 00:38

## 2017-02-09 RX ADMIN — Medication 100 MCG: at 09:37

## 2017-02-09 RX ADMIN — SODIUM BICARBONATE 325 MG: 325 TABLET ORAL at 20:50

## 2017-02-09 RX ADMIN — POTASSIUM PHOSPHATE, MONOBASIC AND POTASSIUM PHOSPHATE, DIBASIC 18 MMOL: 224; 236 INJECTION, SOLUTION INTRAVENOUS at 09:35

## 2017-02-09 RX ADMIN — PANCRELIPASE 24000 UNITS: 24000; 76000; 120000 CAPSULE, DELAYED RELEASE PELLETS ORAL at 12:57

## 2017-02-09 RX ADMIN — BRIVARACETAM 50 MG: 50 INJECTION, SUSPENSION INTRAVENOUS at 20:50

## 2017-02-09 RX ADMIN — POTASSIUM PHOSPHATE, MONOBASIC AND POTASSIUM PHOSPHATE, DIBASIC 18 MMOL: 224; 236 INJECTION, SOLUTION INTRAVENOUS at 00:51

## 2017-02-09 RX ADMIN — PANCRELIPASE 24000 UNITS: 24000; 76000; 120000 CAPSULE, DELAYED RELEASE PELLETS ORAL at 04:59

## 2017-02-09 RX ADMIN — POTASSIUM PHOSPHATE, MONOBASIC AND POTASSIUM PHOSPHATE, DIBASIC 18 MMOL: 224; 236 INJECTION, SOLUTION INTRAVENOUS at 16:33

## 2017-02-09 RX ADMIN — Medication 1 PACKET: at 09:36

## 2017-02-09 RX ADMIN — CALCIUM CARBONATE 1250 MG: 1250 SUSPENSION ORAL at 12:58

## 2017-02-09 RX ADMIN — Medication 1 PACKET: at 20:51

## 2017-02-09 RX ADMIN — Medication 100 MCG: at 16:33

## 2017-02-09 RX ADMIN — CIPROFLOXACIN 400 MG: 2 INJECTION, SOLUTION INTRAVENOUS at 04:59

## 2017-02-09 NOTE — PLAN OF CARE
Problem: Goal Outcome Summary  Goal: Goal Outcome Summary  5B-PT: Pt performed supine<>sit transfer with mod-max A x 2 and vc for sequencing. He sat EOB for ~10' while PT performed approximation, rhythmic rotations and PROM to promote inc ROM/dec tone in R LE. R UE painful to touch.       Continue to rec LTACH at dc secondary to pt's reduced strength and current inability to pivot (able to at baseline) as well as medical care needs.
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
## 2017-02-09 NOTE — PROGRESS NOTES
GASTROENTEROLOGY PROGRESS NOTE        ASSESSMENT/ RECOMMENDATIONS:    Assessment:  56 year old male with a history of TBI with resultant seizures, aphasia, right sided hemiplegia, v-fib cardiac arrest, panhypopituitarism, and chronic tracheostomy who is transferred from [facility] where he was admitted 1/21 from his LTACH with increasing abd discomfort, fevers and resp distress and was found to have necrotizing pancreatitis based on CT findings of multiple locations of walled off necrosis. He did undergo CT guided aspiration of the largest fluid collection in the pancreatic tail 1/22 in which cultures grew enterobacter cloacae complex + VRE.     PROCEDURES:  1/22/2017 - CT guided aspiration of necrotic collection ([facility])  1/30/2017 - EUS with cystgastrostomy, hot Axios and solus stent placement  2/7/2017 - EGD with necrosectomy, axios removed and replaced with double pig tailed catheter collection #1               - EUS with placement of 2 double pigtail catheters into collection #2    Fluid from initial cystgastrostomy sent for culture -- mod growth pseudomonas aeruginosa, light growth Enterobacter cloacae, mod growth Candida and mod growth Enterococcus faecium (VRE).     Recommendations:    Plan for repeat EGD with necrosectomy sometime next week. Repeat CT scan with IV contrast on Monday before the procedure.     Continue PEG-J TF    Hold anticoagulation for 72hrs post procedure.      Continue antibiotics, currently on ciprofloxacin, plan for total 7 days.     The patient was discussed and plan agreed upon with GI staff, Dr. Diego.     Jian Leyva  [facility]  Gastroenterology Fellow  _______________________________________________________________    S: denied any pain, look in no distress, afebrile, no vomiting or nausea.     O:  Blood pressure 98/60, pulse 86, temperature 97.3  F (36.3  C), temperature source Oral, resp. rate 16, height  "1.651 m (5' 5\"), weight 73.755 kg (162 lb 9.6 oz), SpO2 97 %.    Gen: no distress  CV: normal S1, S2, no murmur  Lungs: CTAB  Abd: soft, non tender, PEG tube present with no surrounding changes.     LABS:  BMP  Recent Labs  Lab 02/08/17  0642 02/07/17  0452 02/05/17  1330 02/04/17  0407   * 135 136 139   POTASSIUM 3.7 4.2 4.2 4.0   CHLORIDE 100 100 103 105   STEVE 7.6* 7.7* 7.3* 7.4*   CO2 24 26 25 25   BUN 16 20 25 19   CR 0.65* 0.69 0.68 0.67   * 85 94 104*     CBC  Recent Labs  Lab 02/08/17  0642 02/07/17  0452 02/06/17  0811 02/05/17  1330   WBC 8.8 11.7* 12.8* 12.6*   RBC 3.55* 3.86* 3.90* 3.66*   HGB 8.5* 9.4* 9.9* 9.0*   HCT 29.1* 31.8* 32.0* 30.2*   MCV 82 82 82 83   MCH 23.9* 24.4* 25.4* 24.6*   MCHC 29.2* 29.6* 30.9* 29.8*   RDW 20.1* 20.5* 20.5* 20.9*    363 404 399     INR  Recent Labs  Lab 02/07/17  0452   INR 1.27*     LFTs  Recent Labs  Lab 02/08/17  0642   ALKPHOS 59   AST 17   ALT 25   BILITOTAL 0.6   PROTTOTAL 6.4*   ALBUMIN 2.0*      PANCNo lab results found in last 7 days.           "

## 2017-02-09 NOTE — PLAN OF CARE
Problem: Goal Outcome Summary  Goal: Goal Outcome Summary  OT/5B: Pt tolerated PROM to RUE and exercises to LUE for increased strength.      Per plan established by the OT, the recommendation for dc location is discharge to TCU vs LTACH pending medical needs at discharge.

## 2017-02-09 NOTE — PROGRESS NOTES
Maintaining SpO2 95-99% on trach dome. Suctioned x3 for thick tan sputum. Lerma catheter removed. Incontinent of urine after catheter removal. Stool x1. Turned every two hours. Desonide applied to rash on buttocks. Denies pain or discomfort.  Tube feeds infusing at 60ml/hour. Bed alarm on.

## 2017-02-09 NOTE — PLAN OF CARE
Problem: Goal Outcome Summary  Goal: Goal Outcome Summary  Outcome: No Change  D/I: Pt is calm and vitally stable. Oriented but non-verbal, however, he can communicates with the hand and with head movements. Follows directions and helps in repositioning. Sats 97-99% on 40% Trach dome.  suction x1. Incontinent of urine x 2. Coccyx is raw and excoriated. Desitin cream applied. Last changed and turned at 0600. TF at goal rate of 60ml/hr.  P: Will continue to monitor and care per plan.

## 2017-02-10 ENCOUNTER — APPOINTMENT (OUTPATIENT)
Dept: OCCUPATIONAL THERAPY | Facility: CLINIC | Age: 57
DRG: 405 | End: 2017-02-10
Attending: INTERNAL MEDICINE
Payer: MEDICARE

## 2017-02-10 ENCOUNTER — APPOINTMENT (OUTPATIENT)
Dept: PHYSICAL THERAPY | Facility: CLINIC | Age: 57
DRG: 405 | End: 2017-02-10
Attending: INTERNAL MEDICINE
Payer: MEDICARE

## 2017-02-10 ENCOUNTER — CARE COORDINATION (OUTPATIENT)
Dept: CARDIOLOGY | Facility: CLINIC | Age: 57
End: 2017-02-10

## 2017-02-10 ENCOUNTER — TRANSFERRED RECORDS (OUTPATIENT)
Dept: HEALTH INFORMATION MANAGEMENT | Facility: CLINIC | Age: 57
End: 2017-02-10

## 2017-02-10 VITALS
DIASTOLIC BLOOD PRESSURE: 58 MMHG | SYSTOLIC BLOOD PRESSURE: 102 MMHG | WEIGHT: 161.5 LBS | TEMPERATURE: 97.8 F | BODY MASS INDEX: 26.91 KG/M2 | RESPIRATION RATE: 18 BRPM | HEART RATE: 61 BPM | OXYGEN SATURATION: 100 % | HEIGHT: 65 IN

## 2017-02-10 LAB
ANION GAP SERPL CALCULATED.3IONS-SCNC: 8 MMOL/L (ref 3–14)
BACTERIA SPEC CULT: ABNORMAL
BUN SERPL-MCNC: 15 MG/DL (ref 7–30)
CALCIUM SERPL-MCNC: 7.2 MG/DL (ref 8.5–10.1)
CHLORIDE SERPL-SCNC: 99 MMOL/L (ref 94–109)
CO2 SERPL-SCNC: 25 MMOL/L (ref 20–32)
CREAT SERPL-MCNC: 0.65 MG/DL (ref 0.66–1.25)
GFR SERPL CREATININE-BSD FRML MDRD: ABNORMAL ML/MIN/1.7M2
GLUCOSE BLDC GLUCOMTR-MCNC: 116 MG/DL (ref 70–99)
GLUCOSE SERPL-MCNC: 114 MG/DL (ref 70–99)
MICRO REPORT STATUS: ABNORMAL
MICROORGANISM SPEC CULT: ABNORMAL
PHOSPHATE SERPL-MCNC: 2.1 MG/DL (ref 2.5–4.5)
POTASSIUM SERPL-SCNC: 3.7 MMOL/L (ref 3.4–5.3)
SODIUM SERPL-SCNC: 132 MMOL/L (ref 133–144)
SPECIMEN SOURCE: ABNORMAL

## 2017-02-10 PROCEDURE — 27210436 ZZH NUTRITION PRODUCT SEMIELEM INTERMED CAN

## 2017-02-10 PROCEDURE — 25000132 ZZH RX MED GY IP 250 OP 250 PS 637: Mod: GY

## 2017-02-10 PROCEDURE — 97530 THERAPEUTIC ACTIVITIES: CPT | Mod: GP

## 2017-02-10 PROCEDURE — 25000132 ZZH RX MED GY IP 250 OP 250 PS 637: Mod: GY | Performed by: NURSE PRACTITIONER

## 2017-02-10 PROCEDURE — A9270 NON-COVERED ITEM OR SERVICE: HCPCS | Mod: GY | Performed by: PHYSICIAN ASSISTANT

## 2017-02-10 PROCEDURE — 25000132 ZZH RX MED GY IP 250 OP 250 PS 637: Mod: GY | Performed by: SURGERY

## 2017-02-10 PROCEDURE — 00000146 ZZHCL STATISTIC GLUCOSE BY METER IP

## 2017-02-10 PROCEDURE — 25000128 H RX IP 250 OP 636: Performed by: INTERNAL MEDICINE

## 2017-02-10 PROCEDURE — 25000125 ZZHC RX 250

## 2017-02-10 PROCEDURE — 25000128 H RX IP 250 OP 636: Performed by: SURGERY

## 2017-02-10 PROCEDURE — 40000133 ZZH STATISTIC OT WARD VISIT

## 2017-02-10 PROCEDURE — 97110 THERAPEUTIC EXERCISES: CPT | Mod: GP

## 2017-02-10 PROCEDURE — 84100 ASSAY OF PHOSPHORUS: CPT | Performed by: INTERNAL MEDICINE

## 2017-02-10 PROCEDURE — 80048 BASIC METABOLIC PNL TOTAL CA: CPT | Performed by: INTERNAL MEDICINE

## 2017-02-10 PROCEDURE — 25000132 ZZH RX MED GY IP 250 OP 250 PS 637: Mod: GY | Performed by: PHYSICIAN ASSISTANT

## 2017-02-10 PROCEDURE — A9270 NON-COVERED ITEM OR SERVICE: HCPCS | Mod: GY

## 2017-02-10 PROCEDURE — 40000047 ZZH STATISTIC CTO2 CONT OXYGEN TECH TIME EA 90 MIN

## 2017-02-10 PROCEDURE — 40000193 ZZH STATISTIC PT WARD VISIT

## 2017-02-10 PROCEDURE — 27210437 ZZH NUTRITION PRODUCT SEMIELEM INTERMED LITER

## 2017-02-10 PROCEDURE — A9270 NON-COVERED ITEM OR SERVICE: HCPCS | Mod: GY | Performed by: INTERNAL MEDICINE

## 2017-02-10 PROCEDURE — A9270 NON-COVERED ITEM OR SERVICE: HCPCS | Mod: GY | Performed by: SURGERY

## 2017-02-10 PROCEDURE — 97110 THERAPEUTIC EXERCISES: CPT | Mod: GO

## 2017-02-10 PROCEDURE — 36592 COLLECT BLOOD FROM PICC: CPT | Performed by: INTERNAL MEDICINE

## 2017-02-10 PROCEDURE — 25000132 ZZH RX MED GY IP 250 OP 250 PS 637: Mod: GY | Performed by: INTERNAL MEDICINE

## 2017-02-10 PROCEDURE — A9270 NON-COVERED ITEM OR SERVICE: HCPCS | Mod: GY | Performed by: NURSE PRACTITIONER

## 2017-02-10 PROCEDURE — 99238 HOSP IP/OBS DSCHRG MGMT 30/<: CPT | Mod: GC | Performed by: INTERNAL MEDICINE

## 2017-02-10 RX ORDER — CARBAMAZEPINE 100 MG/1
200 TABLET, CHEWABLE ORAL 4 TIMES DAILY
Status: ON HOLD | DISCHARGE
Start: 2017-02-10 | End: 2017-06-07

## 2017-02-10 RX ORDER — CIPROFLOXACIN 2 MG/ML
400 INJECTION, SOLUTION INTRAVENOUS EVERY 12 HOURS
Qty: 1600 ML | Refills: 0 | DISCHARGE
Start: 2017-02-10 | End: 2017-02-14

## 2017-02-10 RX ORDER — BISACODYL 10 MG
10 SUPPOSITORY, RECTAL RECTAL DAILY PRN
Qty: 30 SUPPOSITORY | Status: ON HOLD | DISCHARGE
Start: 2017-02-10 | End: 2018-09-02

## 2017-02-10 RX ORDER — DESMOPRESSIN ACETATE 0.1 MG/1
0.1 TABLET ORAL EVERY 8 HOURS
Status: ON HOLD | DISCHARGE
Start: 2017-02-10 | End: 2017-06-05

## 2017-02-10 RX ORDER — GUAR GUM
1 PACKET (EA) ORAL 3 TIMES DAILY
Status: ON HOLD | DISCHARGE
Start: 2017-02-10 | End: 2018-07-05

## 2017-02-10 RX ORDER — ALBUTEROL SULFATE 0.83 MG/ML
2.5 SOLUTION RESPIRATORY (INHALATION)
Qty: 360 ML | Status: ON HOLD | DISCHARGE
Start: 2017-02-10 | End: 2017-06-05

## 2017-02-10 RX ORDER — HEPARIN SODIUM 5000 [USP'U]/.5ML
5000 INJECTION, SOLUTION INTRAVENOUS; SUBCUTANEOUS EVERY 8 HOURS
Status: ON HOLD | DISCHARGE
Start: 2017-02-11 | End: 2017-06-05

## 2017-02-10 RX ORDER — SODIUM BICARBONATE 325 MG/1
325 TABLET ORAL EVERY 4 HOURS
Status: ON HOLD | DISCHARGE
Start: 2017-02-10 | End: 2017-06-05

## 2017-02-10 RX ADMIN — Medication 1 PACKET: at 09:40

## 2017-02-10 RX ADMIN — SODIUM BICARBONATE 325 MG: 325 TABLET ORAL at 09:41

## 2017-02-10 RX ADMIN — PANCRELIPASE 48000 UNITS: 24000; 76000; 120000 CAPSULE, DELAYED RELEASE PELLETS ORAL at 00:42

## 2017-02-10 RX ADMIN — Medication 15 ML: at 09:39

## 2017-02-10 RX ADMIN — PANCRELIPASE 24000 UNITS: 24000; 76000; 120000 CAPSULE, DELAYED RELEASE PELLETS ORAL at 09:41

## 2017-02-10 RX ADMIN — PANCRELIPASE 48000 UNITS: 24000; 76000; 120000 CAPSULE, DELAYED RELEASE PELLETS ORAL at 04:34

## 2017-02-10 RX ADMIN — SODIUM BICARBONATE 325 MG: 325 TABLET ORAL at 04:34

## 2017-02-10 RX ADMIN — Medication 81 MG: at 09:40

## 2017-02-10 RX ADMIN — SODIUM BICARBONATE 325 MG: 325 TABLET ORAL at 00:42

## 2017-02-10 RX ADMIN — Medication 20 MG: at 09:40

## 2017-02-10 RX ADMIN — Medication 150 MCG: at 09:40

## 2017-02-10 RX ADMIN — BRIVARACETAM 50 MG: 50 INJECTION, SUSPENSION INTRAVENOUS at 09:41

## 2017-02-10 RX ADMIN — Medication 100 MCG: at 00:42

## 2017-02-10 RX ADMIN — POTASSIUM PHOSPHATE, MONOBASIC AND POTASSIUM PHOSPHATE, DIBASIC 18 MMOL: 224; 236 INJECTION, SOLUTION INTRAVENOUS at 09:41

## 2017-02-10 RX ADMIN — CALCIUM CARBONATE 1250 MG: 1250 SUSPENSION ORAL at 12:46

## 2017-02-10 RX ADMIN — DIBASIC SODIUM PHOSPHATE, MONOBASIC POTASSIUM PHOSPHATE AND MONOBASIC SODIUM PHOSPHATE 500 MG: 852; 155; 130 TABLET ORAL at 12:46

## 2017-02-10 RX ADMIN — CALCIUM CARBONATE 1250 MG: 1250 SUSPENSION ORAL at 09:39

## 2017-02-10 RX ADMIN — CIPROFLOXACIN 400 MG: 2 INJECTION, SOLUTION INTRAVENOUS at 03:52

## 2017-02-10 RX ADMIN — Medication 100 MCG: at 09:45

## 2017-02-10 RX ADMIN — POTASSIUM PHOSPHATE, MONOBASIC AND POTASSIUM PHOSPHATE, DIBASIC 18 MMOL: 224; 236 INJECTION, SOLUTION INTRAVENOUS at 00:42

## 2017-02-10 RX ADMIN — TESTOSTERONE CYPIONATE 200 MG: 200 INJECTION, SOLUTION INTRAMUSCULAR at 12:49

## 2017-02-10 RX ADMIN — CARBAMAZEPINE 200 MG: 100 SUSPENSION ORAL at 12:46

## 2017-02-10 RX ADMIN — Medication 40 MG: at 09:40

## 2017-02-10 RX ADMIN — CARBAMAZEPINE 200 MG: 100 SUSPENSION ORAL at 09:40
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
## 2017-02-10 NOTE — PLAN OF CARE
Problem: Goal Outcome Summary  Goal: Goal Outcome Summary  Outcome: Improving  D: VSS, Afeb. See Epic for full assessment. Alert all shift. Difficult to assess orientation. Turned q 2 hrs.. Incontinent x3. No stool this shift. Report given to [facility]. Transport here at 1300. D/C'd around 1320.  A/P: Stable when sent with transport.     Gissel Dubois R.N.

## 2017-02-10 NOTE — PROGRESS NOTES
"[facility]  \"Hello, my name is Aurora Stormy , and I am calling from the [facility].  I want to check in and see how you are doing, after leaving the hospital.  You may also receive a call from your Care Coordinator (care team), but I want to make sure you don t have any urgent needs.  I have a couple questions to review with you:     Post-Discharge Outreach                                                    Keyon Farias is a 56 year old male     Follow-up Appointments      Follow Up and recommended labs and tests        BMP, Mg, Phos, CBC on Monday 2/12  CT abd/pevis with contrast on Thursday 2/16                    Care Team:    Patient Care Team       Relationship Specialty Notifications Start End    Julee Roberson MD PCP - General Family Practice  1/6/16     Phone: [phone] Fax: [phone]         [place] [id number]            Transition of Care Review                                                      Did you have a surgery or procedure during your hospital visit? Yes   If yes, do you have any of the following:     Signs of infection:  No:     Pain:  No     Pain Scale (0-10)      Location:     Wound/incision concerns? no    Do you have all of your medications/refills?  Yes    Are you having any side effects or questions about your medication(s)? No    Do you have any new or worsening symptoms?  No    Do you have any future appointments scheduled?   None - patient was transfered to [facility] from [facility] per patient Mother.              Plan                                                      Thanks for your time.  Your Care Coordinator may follow-up within the next couple days.  In the meantime if you have questions, concerns or problems call your care team.        Aurora Burrows      "

## 2017-02-10 NOTE — PLAN OF CARE
Problem: Goal Outcome Summary  Goal: Goal Outcome Summary  OT/5B: Pt tolerated PROM to RUE and exercises to LUE for increased strength.      Per plan established by the OT, the recommendation for dc location is discharge to TCU vs LTACH pending medical needs at discharge

## 2017-02-10 NOTE — PLAN OF CARE
Problem: Goal Outcome Summary  Goal: Goal Outcome Summary  Outcome: No Change  D/I Suctioned x3 for thick creamy sputum.  Maintaining SpO2 95-99% on 30% trach dome. Incontinent of urine (large amounts)  Stool x1.   Turned every two hours. Desonide applied to rash on buttocks. Denies pain or discomfort but swats at staff when cleaning him up.  Tube feeds continue infusing at 60ml/hour. Bed alarm on.  Left TL PICC intact.  Mom here most of the shift.   Continue with POC

## 2017-02-10 NOTE — PROGRESS NOTES
Care Coordinator Progress Note     Admission Date/Time:  1/23/2017  Attending MD:  Chen León MD     Data  Chart reviewed, discussed with interdisciplinary team.   Patient was admitted for: Data Unavailable.    Concerns with insurance coverage for discharge needs: None.  Current Living Situation: Patient came from [facility].  Support System: Supportive and Involved  Services Involved: LTACH  Transportation: Ambulance  Barriers to Discharge: No barriers identified at this time     Assessment  Per MD team, pt is medically stable for discharge today. Pt will discharge back to [facility]. Spoke with Regina Epperson, clinical liaison for [facility], who stated that [facility] did get insurance authorization and transport arranged with [facility] at 1 pm. Pt will go to room 225. MD to MD contact information given to Dr. Venegas and RN to RN contact information given to charge RN. PCS form also given to charge RN. Updated pt and pt's mother, Savannah, and Savannah stated that she plans to come to the hospital this morning.      Plan  Anticipated Discharge Date:  2/10/17  Anticipated Discharge Plan:  Pt will discharge back to [facility].     Cynthia Almazan RN

## 2017-02-10 NOTE — PLAN OF CARE
Problem: Goal Outcome Summary  Goal: Goal Outcome Summary  Physical Therapy Discharge Summary    Reason for therapy discharge:    Discharged to [facility]     Progress towards therapy goal(s). See goals on Care Plan in [facility] electronic health record for goal details.  Goals not met.  Barriers to achieving goals:   discharge from facility.    Therapy recommendation(s):    Continued therapy is recommended.  Rationale/Recommendations:  to progress safe functional mobility..

## 2017-02-10 NOTE — PLAN OF CARE
Problem: Goal Outcome Summary  Goal: Goal Outcome Summary  Outcome: No Change  D/I: Pt is calm and vitally stable. Oriented but non-verbal, however, he can communicates with the hand and with head movements. Follows directions and helps in repositioning. Sats 97-99% on 30% Trach dome.  suction x2. Incontinent of urine x 2. Coccyx is raw and excoriated but improving. Cream applied. Last changed and turned at 0600. TF at goal rate of 60ml/hr.  P: Will continue to monitor and care per plan.

## 2017-02-10 NOTE — PROGRESS NOTES
Mr. Farias is being discharged to LTACH today. Advanced endoscopy team is planning for repeat CT scan later next week (can be completed at his LTACH) and for Dr. Montana to review prior to deciding if patient needs further necrosectomy.    Discussed with primary team    Meliza Lofton PA-C

## 2017-02-10 NOTE — PLAN OF CARE
Problem: Goal Outcome Summary  Goal: Goal Outcome Summary  5B-PT: Pt performed supine<>sit with HOB elevated and mod -min A x 2. He sat EOB for ~ 15' and performed reaching tasks to progress dynamic stting balance (fwd, up, down and rotations). Pt performed seated and supine therEx to promote strengthening and inc ROM. Tolerated session well, but remains limited by activity tolerance and strength.    Continue to rec LTACH at dc secondary to pt's reduced strength and current inability to pivot (able to at baseline) as well as medical care needs.

## 2017-02-11 NOTE — PLAN OF CARE
Problem: Goal Outcome Summary  Goal: Goal Outcome Summary  Occupational Therapy Discharge Summary    Reason for therapy discharge:    Discharged to transitional care facility.    Progress towards therapy goal(s). See goals on Care Plan in [facility] electronic health record for goal details.  Goals not met.  Barriers to achieving goals:   discharge from facility.    Therapy recommendation(s):    Continued therapy is recommended.  Rationale/Recommendations:  To progress B UE function and independence.

## 2017-02-23 DIAGNOSIS — K85.90 ACUTE PANCREATITIS, UNSPECIFIED COMPLICATION STATUS, UNSPECIFIED PANCREATITIS TYPE: Primary | ICD-10-CM

## 2017-03-03 ENCOUNTER — TELEPHONE (OUTPATIENT)
Dept: GASTROENTEROLOGY | Facility: CLINIC | Age: 57
End: 2017-03-03

## 2017-03-03 NOTE — TELEPHONE ENCOUNTER
Patient has been added on to Dr. Montana's schedule on 06/12/2017 at 7:15 AM.  Mom/Guardian informed.    I called and spoke to his mom who is his guardian and she states that she is awaiting a call for results from a CT. I see that he had one on 02/07/2017    Sent that message to Dr. Montana.    SR 03/03/2017  248p

## 2017-03-06 ENCOUNTER — TELEPHONE (OUTPATIENT)
Dept: GASTROENTEROLOGY | Facility: CLINIC | Age: 57
End: 2017-03-06

## 2017-03-06 NOTE — TELEPHONE ENCOUNTER
Jus Montana MD Ramnaryan, Steffane Cc: Gege Soto LPN                   Please communicate that I spoke to his physician at the long [facility] hes at     Things looks stable to improving; no interventions indicated     Jus            Previous Messages       ----- Message -----      From: Susanna Fischer      Sent: 3/3/2017   2:45 PM        To: Gege Soto LPN, Jus Montana MD     Done.   I called and spoke to his mom who is his guardian and she states that she is awaiting a call for results from a CT. I see that he had one on 02/07/2017.     Thanks,   Julisa          Communicated the above with mom.    SR 03/06/2017  1056a

## 2017-05-04 ENCOUNTER — TELEPHONE (OUTPATIENT)
Dept: FAMILY MEDICINE | Facility: CLINIC | Age: 57
End: 2017-05-04

## 2017-05-04 NOTE — TELEPHONE ENCOUNTER
Reason for Call:  Mom (Savannah)    Detailed comments: states Dr.Sarah Xiong spoke to  and he  Approved to take on Keyon and be his PCP: Please verify this and call the pt to  schedule    Phone Number Patient can be reached at: Cell number on file:    Telephone Information:   Mobile [phone]       Best Time: anytime    Can we leave a detailed message on this number? YES    Call taken on 5/4/2017 at 1:19 PM by Ha Salinas

## 2017-05-05 NOTE — TELEPHONE ENCOUNTER
I would suggest that he be referred to the complex care clinic or with [facility].  They would best be able to manage his care as they could offer more time to his office visit appointments.    Brandon Palomares MD

## 2017-05-15 ENCOUNTER — HOSPITAL ENCOUNTER (EMERGENCY)
Facility: CLINIC | Age: 57
Discharge: HOME | End: 2017-05-15
Attending: EMERGENCY MEDICINE | Admitting: EMERGENCY MEDICINE
Payer: MEDICARE

## 2017-05-15 VITALS
OXYGEN SATURATION: 99 % | HEIGHT: 72 IN | BODY MASS INDEX: 22.35 KG/M2 | RESPIRATION RATE: 16 BRPM | DIASTOLIC BLOOD PRESSURE: 71 MMHG | WEIGHT: 165 LBS | TEMPERATURE: 96.8 F | HEART RATE: 51 BPM | SYSTOLIC BLOOD PRESSURE: 119 MMHG

## 2017-05-15 DIAGNOSIS — R56.9 CONVULSIONS, UNSPECIFIED CONVULSION TYPE (H): ICD-10-CM

## 2017-05-15 LAB
ANION GAP SERPL CALCULATED.3IONS-SCNC: 9 MMOL/L (ref 3–14)
BUN SERPL-MCNC: 14 MG/DL (ref 7–30)
CALCIUM SERPL-MCNC: 8.5 MG/DL (ref 8.5–10.1)
CARBAMAZEPINE SERPL-MCNC: 12.8 MG/L (ref 4–12)
CHLORIDE SERPL-SCNC: 91 MMOL/L (ref 94–109)
CO2 SERPL-SCNC: 30 MMOL/L (ref 20–32)
CREAT SERPL-MCNC: 0.95 MG/DL (ref 0.66–1.25)
ERYTHROCYTE [DISTWIDTH] IN BLOOD BY AUTOMATED COUNT: 18.3 % (ref 10–15)
GFR SERPL CREATININE-BSD FRML MDRD: 82 ML/MIN/1.7M2
GLUCOSE SERPL-MCNC: 107 MG/DL (ref 70–99)
HCT VFR BLD AUTO: 38.2 % (ref 40–53)
HGB BLD-MCNC: 12.4 G/DL (ref 13.3–17.7)
INTERPRETATION ECG - MUSE: NORMAL
MCH RBC QN AUTO: 23.9 PG (ref 26.5–33)
MCHC RBC AUTO-ENTMCNC: 32.5 G/DL (ref 31.5–36.5)
MCV RBC AUTO: 74 FL (ref 78–100)
PLATELET # BLD AUTO: 163 10E9/L (ref 150–450)
POTASSIUM SERPL-SCNC: 4.2 MMOL/L (ref 3.4–5.3)
RBC # BLD AUTO: 5.19 10E12/L (ref 4.4–5.9)
SODIUM SERPL-SCNC: 130 MMOL/L (ref 133–144)
WBC # BLD AUTO: 8 10E9/L (ref 4–11)

## 2017-05-15 PROCEDURE — 80048 BASIC METABOLIC PNL TOTAL CA: CPT | Performed by: EMERGENCY MEDICINE

## 2017-05-15 PROCEDURE — 80156 ASSAY CARBAMAZEPINE TOTAL: CPT | Performed by: EMERGENCY MEDICINE

## 2017-05-15 PROCEDURE — 93005 ELECTROCARDIOGRAM TRACING: CPT

## 2017-05-15 PROCEDURE — 99284 EMERGENCY DEPT VISIT MOD MDM: CPT

## 2017-05-15 PROCEDURE — 85027 COMPLETE CBC AUTOMATED: CPT | Performed by: EMERGENCY MEDICINE

## 2017-05-15 ASSESSMENT — ENCOUNTER SYMPTOMS
FEVER: 0
VOMITING: 0
COUGH: 0
SEIZURES: 1
CONSTIPATION: 1
DIARRHEA: 0

## 2017-05-15 NOTE — DISCHARGE INSTRUCTIONS
Increase Briviact to 100 mg twice per day    If find that he is too sleepy from this increase, can go back down to 50 mg in morning and 100 mg at night

## 2017-05-15 NOTE — ED NOTES
Bed: ED13  Expected date:   Expected time:   Means of arrival:   Comments:  jamie - 57 seizure eta 1010     David Parada  05/15/17 1015

## 2017-05-15 NOTE — ED PROVIDER NOTES
History     Chief Complaint:  Seizure      The history is provided by a caregiver. History limited by: patient's nonverbal status.      Keyon Farias is a 56 year old male who presents via EMS after a seizure.  The caregiver reports patient told him he had a bowel movement.  He then tensed his arms up three times in succession and was unresponsive, the episodes of tensing occurred over approximately 1 minute.  Home health nurse measured his heart rate on home monitor and found it in the 40's so she contacted EMS.   He remained post ictal for awhile so EMS called.  On EMS arrival patient was still post ictal.  EMS was unable to place a line or get blood to check sugar en route.  At time of history patient is behaving normally per mother and home health nurse.  Mother notes typical seizures involve him leaning off to the side though did have grand mal seizures late last year.  The patient has not missed any doses of his seizure medications and did not have any recent falls.  Home health nurse reports more frequent small and hard bowel movements of late and wonders if he may be constipated.  He has not had recent cough, fevers, vomiting, or other symptoms.  Family and nurse deny other concern.  Patient unable to give any history.    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 No pertinent family history.      Social History:  Presents with home health nurse, mother, and sister   Has home health care  Tobacco use: Former smoker, QD 1989  Alcohol use: Negative  PCP: Julee Roberson    Neurologist: Dr. Nguyen  Marital Status:  Single       Review of Systems   Unable to perform ROS: Patient nonverbal   Constitutional: Negative for fever.   Respiratory: Negative for cough.    Gastrointestinal: Positive for constipation. Negative for diarrhea and vomiting.   Neurological: Positive for seizures.   All other systems reviewed and are negative.      Physical Exam     Patient Vitals for the past 24 hrs:   BP Temp Temp src Pulse Heart Rate Resp SpO2 Height Weight   05/15/17 1329 119/71 - - - 56 16 99 % - -   05/15/17 1300 - - - - 53 13 98 % - -   05/15/17 1200 - - - - 49 11 100 % - -   05/15/17 1100 - - - - 55 14 - - -   05/15/17 1034 116/69 96.8  F (36  C) Temporal 51 - 18 96 % 1.829 m (6') 74.8 kg (165 lb)      Physical Exam  General: Resting comfortably on the gurney  Eyes:  The pupils are equal and round  ENT:    No head    Moist mucous membranes  Neck:  Normal range of motion  CV:  Bradycardic rate and rhythm    Skin warm and well perfused   Resp:  Lungs are clear    Non-labored    No rales    No wheezing   GI:  Abdomen is soft, there is no rigidity    No distension    No rebound tenderness     No abdominal tenderness  MS:  Normal muscular tone  Skin:  No rash or acute skin lesions noted  Neuro:   Awake, alert.      Does make some vocalizations but no clear words (baseline_    Face is symmetric.     Moves all extremities though moves right lower extremity less than others  (baseline)  Psych:  Normal affect.  Appropriate interactions for patient    Emergency Department Course   ECG (11:09:42):  Rate 53 bpm. DC interval 202. QRS duration 114. QT/QTc 444/416. P-R-T axes 48 -43 26.  Sinus bradycardia.  Left axis deviation.  Minimal voltage criteria for LVH, may be normal variant.  Abnormal ECG.  Agree with computer.  Interpreted at 1116 by Tamara Thomas MD.     Laboratory:  CBC: HGB 12.4 (L) ow WNL (WBC 8.0, )   BMP:  (L), Chloride 91 (L), Glucose 107 (H) ow WNL (Creatinine 0.95)   Carbamazepine total: 12.8 (H)    Emergency Department Course:  1013: The patient arrived in the emergency department via EMS and I received report from EMS.  Past medical records, nursing notes, and vitals reviewed.  1030: I performed an exam of the patient and obtained history, as documented above.   Above evaluation undertaken.  1326: Discussed patient with Dr. Valdes of neurology.   I personally reviewed the laboratory results with the caregiver and answered all related questions prior to discharge.   1349: I rechecked the patient.  Findings and plan explained to the mother. Patient discharged home with instructions regarding supportive care, medications, and reasons to return. The importance of close follow-up was reviewed.      Impression & Plan    Medical Decision Making:  Keyon Farias is a 56 year old male who presents to the emergency department with seizure.  He had what sounds like seizure at home with post ictal period.  The family and caregiver say that he is back to normal by the time I evaluated him.  He does have a seizure disorder history without new head trauma so will not obtain imaging of his head. No fever or recent illness to suggest meningitis. Well appearing in ED and back to baseline mental status. The carbamazepine level was obtained and slightly elevated, the remaining laboratory evaluation were unremarkable.  I did discuss the patient with   Lucio who recommended increasing his Briviact to 100 mg twice per day from 50 mg twice per day.  If he becomes too sleepy with that dose increase, they can go to 50 in the morning and 100 at night.  They will follow up with neurology for further recommendations.  Reasons to return to the emergency department were discussed.     Diagnosis:    ICD-10-CM    1. Convulsions, unspecified convulsion type (H) R56.9        Disposition:  Discharged to home.    Discharge Medications:  New Prescriptions    BRIVARACETAM (BRIVIACT) 100 MG TABLET    Take 1 tablet (100 mg) by mouth 2 times daily         David Parada  5/15/2017    EMERGENCY DEPARTMENT    IDavid am serving as a scribe at 10:32 AM on 5/15/2017 to document services personally performed by Tamara Thomas MD based on my observations and the provider's statements to me.       Tamara Thomas MD  05/15/17 1943

## 2017-05-15 NOTE — ED AVS SNAPSHOT
Emergency Department    [address] [place] [id number]    Phone:  [phone]    Fax:  [phone]                                       Keyon Farias   MRN: [id number]    Department:   Emergency Department   Date of Visit:  5/15/2017           After Visit Summary Signature Page     I have received my discharge instructions, and my questions have been answered. I have discussed any challenges I see with this plan with the nurse or doctor.    ..........................................................................................................................................  Patient/Patient Representative Signature      ..........................................................................................................................................  Patient Representative Print Name and Relationship to Patient    ..................................................               ................................................  Date                                            Time    ..........................................................................................................................................  Reviewed by Signature/Title    ...................................................              ..............................................  Date                                                            Time

## 2017-05-15 NOTE — ED AVS SNAPSHOT
Emergency Department    [address] [facility] [address]    Phone:  [phone]    Fax:  [phone]                                       Keyon Farias   MRN: [id number]    Department:   Emergency Department   Date of Visit:  5/15/2017           Patient Information     Date Of Birth          1960        Your diagnoses for this visit were:     Convulsions, unspecified convulsion type (H)        You were seen by Tamara Thomas MD.      Follow-up Information     Follow up with neurology.        Follow up with  Emergency Department.    Specialty:  EMERGENCY MEDICINE    Why:  If symptoms worsen    Contact information:    [address] [facility] [id number] [phone]        Discharge Instructions       Increase Briviact to 100 mg twice per day    If find that he is too sleepy from this increase, can go back down to 50 mg in morning and 100 mg at night    Future Appointments        Provider Department Dept Phone Center    6/12/2017 7:15 AM Jus Montana MD  [facility] [phone] [facility]      24 Hour Appointment Hotline       To make an appointment at any [facility], call [phone]). If you don't have a family doctor or clinic, we will help you find one. [facility] are conveniently located to serve the needs of you and your family.             Review of your medicines      START taking        Dose / Directions Last dose taken    Brivaracetam 100 MG tablet   Commonly known as:  BRIVIACT   Dose:  100 mg   Quantity:  60 tablet   Replaces:  Brivaracetam 10 MG/ML solution        Take 1 tablet (100 mg) by mouth 2 times daily   Refills:  0          Our records show that you are taking the medicines listed below. If these are incorrect, please call your family doctor or clinic.        Dose / Directions Last dose taken    ACETAMINOPHEN PO   Dose:  650 mg        650 mg by Per Feeding Tube route every 4 hours as  needed for pain   Refills:  0        albuterol (2.5 MG/3ML) 0.083% neb solution   Dose:  2.5 mg   Quantity:  360 mL        Take 1 vial (2.5 mg) by nebulization every 2 hours as needed for shortness of breath / dyspnea   Refills:  0        amylase-lipase-protease 06940 UNITS Cpep per EC capsule   Commonly known as:  CREON 24   Dose:  1-2 capsule   Quantity:  180 capsule        1-2 capsules (24,000-48,000 Units) by Per J Tube route every 4 hours   Refills:  0        aspirin 10 mg/mL Susp   Dose:  81 mg        8.1 mLs (81 mg) by Per J Tube route daily   Refills:  0        bisacodyl 10 MG Suppository   Commonly known as:  DULCOLAX   Dose:  10 mg   Quantity:  30 suppository        Place 1 suppository (10 mg) rectally daily as needed for constipation   Refills:  0        calcium carbonate 1250 (500 CA) MG/5ML Susp suspension   Dose:  1250 mg   Quantity:  450 mL        5 mLs (1,250 mg) by Per J Tube route 3 times daily (with meals)   Refills:  0        carBAMazepine 100 MG chewable tablet   Commonly known as:  TEGretol   Dose:  200 mg        2 tablets (200 mg) by Per J Tube route 4 times daily 0600, 1100, 1500, 1900. Administer 1 hour before and 1 hour after tube feeding   Refills:  0        desmopressin 0.1 MG tablet   Commonly known as:  DDAVP   Dose:  0.1 mg        1 tablet (100 mcg) by Oral or Feeding Tube route every 8 hours   Refills:  0        fiber modular packet   Dose:  1 packet        1 packet by Per Feeding Tube route 3 times daily   Refills:  0        heparin sodium PF 5000 UNIT/0.5ML injection   Dose:  5000 Units        Inject 0.5 mLs (5,000 Units) Subcutaneous every 8 hours   Refills:  0        * hydrocortisone 2 mg/mL Susp   Commonly known as:  CORTEF   Dose:  20 mg        10 mLs (20 mg) by Per J Tube route every morning   Refills:  0        * hydrocortisone 2 mg/mL Susp   Commonly known as:  CORTEF   Dose:  10 mg        Take 5 mLs (10 mg) by mouth every evening   Refills:  0        levothyroxine 25 mcg/mL  Susp   Commonly known as:  SYNTHROID   Dose:  150 mcg        6 mLs (150 mcg) by Per J Tube route every morning (before breakfast)   Refills:  0        melatonin 1 MG/ML Liqd liquid   Dose:  6 mg        6 mLs (6 mg) by Per J Tube route every evening   Refills:  0        miconazole 2 % powder   Commonly known as:  MICATIN; MICRO GUARD        Apply topically every hour as needed for other (topical candidiasis)   Refills:  0        multivitamins with minerals Liqd liquid   Dose:  15 mL        15 mLs by Per J Tube route daily   Refills:  0        pantoprazole Susp suspension   Commonly known as:  PROTONIX   Dose:  40 mg        20 mLs (40 mg) by Per Feeding Tube route 2 times daily   Refills:  0        polyethylene glycol Packet   Commonly known as:  MIRALAX/GLYCOLAX   Dose:  17 g   Quantity:  7 packet        Take 17 g by mouth 2 times daily as needed (constipation)   Refills:  0        potassium phosphate solution   Dose:  18 mmol   Quantity:  200 mL        6 mLs (18 mmol) by Per J Tube route every 8 hours   Refills:  0        sodium bicarbonate 325 MG tablet   Dose:  325 mg        1 tablet (325 mg) by Per J Tube route every 4 hours   Refills:  0        testosterone cypionate 200 MG/ML injection   Commonly known as:  DEPOTESTOTERONE   Dose:  200 mg        Inject 200 mg into the muscle once a week Friday   Refills:  0        * Notice:  This list has 2 medication(s) that are the same as other medications prescribed for you. Read the directions carefully, and ask your doctor or other care provider to review them with you.      STOP taking        Dose Reason for stopping Comments    Brivaracetam 10 MG/ML solution   Commonly known as:  BRIVIACT   Replaced by:  Brivaracetam 100 MG tablet                      Prescriptions were sent or printed at these locations (1 Prescription)                   Other Prescriptions                Printed at Department/Unit printer (1 of 1)         Brivaracetam (BRIVIACT) 100 MG tablet                 Procedures and tests performed during your visit     Basic metabolic panel    CBC (+ platelets, no diff)    Carbamazepine total    EKG 12 lead      Orders Needing Specimen Collection     None      Pending Results     No orders found from 5/13/2017 to 5/16/2017.            Pending Culture Results     No orders found from 5/13/2017 to 5/16/2017.            Pending Results Instructions     If you had any lab results that were not finalized at the time of your Discharge, you can call the ED Lab Result RN at [phone]. You will be contacted by this team for any positive Lab results or changes in treatment. The nurses are available 7 days a week from 10A to 6:30P.  You can leave a message 24 hours per day and they will return your call.        Test Results From Your Hospital Stay        5/15/2017 12:01 PM      Component Results     Component Value Ref Range & Units Status    Sodium 130 (L) 133 - 144 mmol/L Final    Potassium 4.2 3.4 - 5.3 mmol/L Final    Chloride 91 (L) 94 - 109 mmol/L Final    Carbon Dioxide 30 20 - 32 mmol/L Final    Anion Gap 9 3 - 14 mmol/L Final    Glucose 107 (H) 70 - 99 mg/dL Final    Urea Nitrogen 14 7 - 30 mg/dL Final    Creatinine 0.95 0.66 - 1.25 mg/dL Final    GFR Estimate 82 >60 mL/min/1.7m2 Final    Non  GFR Calc    GFR Estimate If Black >90   GFR Calc   >60 mL/min/1.7m2 Final    Calcium 8.5 8.5 - 10.1 mg/dL Final         5/15/2017 12:04 PM      Component Results     Component Value Ref Range & Units Status    Carbamazepine Level 12.8 (H) 4.0 - 12.0 mg/L Final         5/15/2017 12:21 PM      Component Results     Component Value Ref Range & Units Status    WBC 8.0 4.0 - 11.0 10e9/L Final    RBC Count 5.19 4.4 - 5.9 10e12/L Final    Hemoglobin 12.4 (L) 13.3 - 17.7 g/dL Final    Hematocrit 38.2 (L) 40.0 - 53.0 % Final    MCV 74 (L) 78 - 100 fl Final    MCH 23.9 (L) 26.5 - 33.0 pg Final    MCHC 32.5 31.5 - 36.5 g/dL Final    RDW 18.3 (H) 10.0 - 15.0 %  Final    Platelet Count 163 150 - 450 10e9/L Final                Clinical Quality Measure: Blood Pressure Screening     Your blood pressure was checked while you were in the emergency department today. The last reading we obtained was  BP: 119/71 . Please read the guidelines below about what these numbers mean and what you should do about them.  If your systolic blood pressure (the top number) is less than 120 and your diastolic blood pressure (the bottom number) is less than 80, then your blood pressure is normal. There is nothing more that you need to do about it.  If your systolic blood pressure (the top number) is 120-139 or your diastolic blood pressure (the bottom number) is 80-89, your blood pressure may be higher than it should be. You should have your blood pressure rechecked within a year by a primary care provider.  If your systolic blood pressure (the top number) is 140 or greater or your diastolic blood pressure (the bottom number) is 90 or greater, you may have high blood pressure. High blood pressure is treatable, but if left untreated over time it can put you at risk for heart attack, stroke, or kidney failure. You should have your blood pressure rechecked by a primary care provider within the next 4 weeks.  If your provider in the emergency department today gave you specific instructions to follow-up with your doctor or provider even sooner than that, you should follow that instruction and not wait for up to 4 weeks for your follow-up visit.        Thank you for choosing [facility]       Thank you for choosing [facility] for your care. Our goal is always to provide you with excellent care. Hearing back from our patients is one way we can continue to improve our services. Please take a few minutes to complete the written survey that you may receive in the mail after you visit with us. Thank you!        [employer] Information     [employer] lets you send messages to your doctor, view your test results, renew  "your prescriptions, schedule appointments and more. To sign up, go to [email or web address] . Click on \"Log in\" on the left side of the screen, which will take you to the Welcome page. Then click on \"Sign up Now\" on the right side of the page.     You will be asked to enter the access code listed below, as well as some personal information. Please follow the directions to create your username and password.     Your access code is: [email or web address]  Expires: 2017 10:50 AM     Your access code will  in 90 days. If you need help or a new code, please call your [facility] clinic or [phone].        Care EveryWhere ID     This is your Care EveryWhere ID. This could be used by other organizations to access your [facility] medical records  [id number]        After Visit Summary       This is your record. Keep this with you and show to your community pharmacist(s) and doctor(s) at your next visit.                  "

## 2017-06-01 ENCOUNTER — CARE COORDINATION (OUTPATIENT)
Dept: GASTROENTEROLOGY | Facility: CLINIC | Age: 57
End: 2017-06-01

## 2017-06-01 NOTE — PROGRESS NOTES
Confirmed appointment with Jordon ([facility]) for Dr. Montana at 7:15 am on 6/12/2017.    Dora Kemp LPN  [facility]~ Panc/Bili  Dr. Marcus, Dr. Diego & Dr. Montana  [phone]

## 2017-06-04 ENCOUNTER — APPOINTMENT (OUTPATIENT)
Dept: GENERAL RADIOLOGY | Facility: CLINIC | Age: 57
DRG: 177 | End: 2017-06-04
Attending: EMERGENCY MEDICINE
Payer: MEDICARE

## 2017-06-04 ENCOUNTER — HOSPITAL ENCOUNTER (INPATIENT)
Facility: CLINIC | Age: 57
LOS: 3 days | Discharge: HOME HEALTH | DRG: 177 | End: 2017-06-08
Attending: EMERGENCY MEDICINE | Admitting: INTERNAL MEDICINE
Payer: MEDICARE

## 2017-06-04 DIAGNOSIS — K59.01 SLOW TRANSIT CONSTIPATION: ICD-10-CM

## 2017-06-04 DIAGNOSIS — R78.89 ELEVATED TEGRETOL LEVEL: ICD-10-CM

## 2017-06-04 DIAGNOSIS — J18.9 PNEUMONIA OF RIGHT LOWER LOBE DUE TO INFECTIOUS ORGANISM: ICD-10-CM

## 2017-06-04 DIAGNOSIS — R11.2 NON-INTRACTABLE VOMITING WITH NAUSEA, UNSPECIFIED VOMITING TYPE: ICD-10-CM

## 2017-06-04 DIAGNOSIS — E87.1 HYPONATREMIA: ICD-10-CM

## 2017-06-04 LAB
ANION GAP SERPL CALCULATED.3IONS-SCNC: 10 MMOL/L (ref 3–14)
BASOPHILS # BLD AUTO: 0.1 10E9/L (ref 0–0.2)
BASOPHILS NFR BLD AUTO: 0.9 %
BUN SERPL-MCNC: 13 MG/DL (ref 7–30)
CALCIUM SERPL-MCNC: 8.9 MG/DL (ref 8.5–10.1)
CARBAMAZEPINE SERPL-MCNC: 26.8 MG/L (ref 4–12)
CHLORIDE SERPL-SCNC: 90 MMOL/L (ref 94–109)
CO2 SERPL-SCNC: 26 MMOL/L (ref 20–32)
CREAT SERPL-MCNC: 0.77 MG/DL (ref 0.66–1.25)
DIFFERENTIAL METHOD BLD: ABNORMAL
EOSINOPHIL # BLD AUTO: 0.8 10E9/L (ref 0–0.7)
EOSINOPHIL NFR BLD AUTO: 6.8 %
ERYTHROCYTE [DISTWIDTH] IN BLOOD BY AUTOMATED COUNT: 19.1 % (ref 10–15)
GFR SERPL CREATININE-BSD FRML MDRD: ABNORMAL ML/MIN/1.7M2
GLUCOSE SERPL-MCNC: 95 MG/DL (ref 70–99)
HCT VFR BLD AUTO: 42.2 % (ref 40–53)
HGB BLD-MCNC: 14.2 G/DL (ref 13.3–17.7)
IMM GRANULOCYTES # BLD: 0.1 10E9/L (ref 0–0.4)
IMM GRANULOCYTES NFR BLD: 0.5 %
LYMPHOCYTES # BLD AUTO: 3.7 10E9/L (ref 0.8–5.3)
LYMPHOCYTES NFR BLD AUTO: 33.1 %
MCH RBC QN AUTO: 24.5 PG (ref 26.5–33)
MCHC RBC AUTO-ENTMCNC: 33.6 G/DL (ref 31.5–36.5)
MCV RBC AUTO: 73 FL (ref 78–100)
MONOCYTES # BLD AUTO: 1.1 10E9/L (ref 0–1.3)
MONOCYTES NFR BLD AUTO: 10.1 %
NEUTROPHILS # BLD AUTO: 5.4 10E9/L (ref 1.6–8.3)
NEUTROPHILS NFR BLD AUTO: 48.6 %
NRBC # BLD AUTO: 0 10*3/UL
NRBC BLD AUTO-RTO: 0 /100
PLATELET # BLD AUTO: 264 10E9/L (ref 150–450)
POTASSIUM SERPL-SCNC: 4.1 MMOL/L (ref 3.4–5.3)
RBC # BLD AUTO: 5.8 10E12/L (ref 4.4–5.9)
SODIUM SERPL-SCNC: 126 MMOL/L (ref 133–144)
WBC # BLD AUTO: 11.2 10E9/L (ref 4–11)

## 2017-06-04 PROCEDURE — 25000128 H RX IP 250 OP 636

## 2017-06-04 PROCEDURE — 71020 XR CHEST 2 VW: CPT

## 2017-06-04 PROCEDURE — 96365 THER/PROPH/DIAG IV INF INIT: CPT

## 2017-06-04 PROCEDURE — 99285 EMERGENCY DEPT VISIT HI MDM: CPT | Mod: 25

## 2017-06-04 PROCEDURE — 25000128 H RX IP 250 OP 636: Performed by: EMERGENCY MEDICINE

## 2017-06-04 PROCEDURE — 80156 ASSAY CARBAMAZEPINE TOTAL: CPT | Performed by: EMERGENCY MEDICINE

## 2017-06-04 PROCEDURE — 74000 XR ABDOMEN 1 VW: CPT

## 2017-06-04 PROCEDURE — 96375 TX/PRO/DX INJ NEW DRUG ADDON: CPT

## 2017-06-04 PROCEDURE — 85025 COMPLETE CBC W/AUTO DIFF WBC: CPT | Performed by: EMERGENCY MEDICINE

## 2017-06-04 PROCEDURE — 80048 BASIC METABOLIC PNL TOTAL CA: CPT | Performed by: EMERGENCY MEDICINE

## 2017-06-04 RX ORDER — PIPERACILLIN SODIUM, TAZOBACTAM SODIUM 4; .5 G/20ML; G/20ML
4.5 INJECTION, POWDER, LYOPHILIZED, FOR SOLUTION INTRAVENOUS ONCE
Status: COMPLETED | OUTPATIENT
Start: 2017-06-04 | End: 2017-06-04

## 2017-06-04 RX ORDER — ONDANSETRON 2 MG/ML
INJECTION INTRAMUSCULAR; INTRAVENOUS
Status: COMPLETED
Start: 2017-06-04 | End: 2017-06-04

## 2017-06-04 RX ORDER — ONDANSETRON 2 MG/ML
4 INJECTION INTRAMUSCULAR; INTRAVENOUS ONCE
Status: COMPLETED | OUTPATIENT
Start: 2017-06-04 | End: 2017-06-04

## 2017-06-04 RX ADMIN — ONDANSETRON 4 MG: 2 INJECTION INTRAMUSCULAR; INTRAVENOUS at 22:35

## 2017-06-04 RX ADMIN — ONDANSETRON 4 MG: 2 SOLUTION INTRAMUSCULAR; INTRAVENOUS at 22:35

## 2017-06-04 RX ADMIN — PIPERACILLIN AND TAZOBACTAM 4.5 G: 4; .5 INJECTION, POWDER, FOR SOLUTION INTRAVENOUS at 23:02

## 2017-06-04 NOTE — IP AVS SNAPSHOT
[address], [address]    [facility] [address]    Phone:  [phone]                                       After Visit Summary   6/4/2017    Keyon Farias    MRN: [id number]           After Visit Summary Signature Page     I have received my discharge instructions, and my questions have been answered. I have discussed any challenges I see with this plan with the nurse or doctor.    ..........................................................................................................................................  Patient/Patient Representative Signature      ..........................................................................................................................................  Patient Representative Print Name and Relationship to Patient    ..................................................               ................................................  Date                                            Time    ..........................................................................................................................................  Reviewed by Signature/Title    ...................................................              ..............................................  Date                                                            Time

## 2017-06-04 NOTE — IP AVS SNAPSHOT
MRN:[id number]                      After Visit Summary   6/4/2017    Keyon Farias    MRN: [id number]           Thank you!     Thank you for choosing [facility] for your care. Our goal is always to provide you with excellent care. Hearing back from our patients is one way we can continue to improve our services. Please take a few minutes to complete the written survey that you may receive in the mail after you visit with us. Thank you!        Patient Information     Date Of Birth          1960        About your hospital stay     You were admitted on:  June 5, 2017 You last received care in the:  [place] [address] [facility]    You were discharged on:  June 8, 2017        Reason for your hospital stay       Aspiration pneumonia                  Who to Call     For medical emergencies, please call 911.  For non-urgent questions about your medical care, please call your primary care provider or clinic, None          Attending Provider     Provider Specialty    Katie Conley MD Emergency Medicine    [facility], Manish Millard MD Internal Medicine       Primary Care Provider    None Specified      After Care Instructions     Activity       Your activity upon discharge: activity as tolerated            Diet       Follow this diet upon discharge: Orders Placed This Encounter  NPO for Medical/Clinical Reasons Except for: Meds  INTERVENTIONS  Recommendations / Nutrition Prescription  Adult Formula Drip Feeding: Specified Time Isosource 1.5; Jejunostomy; Goal Rate: 110; mL/hr; From: 8:00 PM; 10:00 AM; Medication - Tube Feeding Flush Frequency: At least 15-30 mL water before and after medication administration and with tube clog                  Follow-up Appointments     Follow-up and recommended labs and tests        You are scheduled for a follow up with Dr. Carlos Gomez on Friday June 16th at 10 a.m. Located at:  [address]   Located in [place] [address]  CBC/BMP  recommended    You are scheduled for a Neurology follow up with Dr. Xiong on Wednesday July 26th at 10:45 a.m. Located at:  [facility]   [address], [address]   [place] [id number] [phone] (Neurology services)                  Your next 10 appointments already scheduled     Jun 12, 2017  7:15 AM CDT   (Arrive by 7:00 AM)   Return Visit with Jus Montana MD   [facility] ([facility] [place] [facility])    [address]  4th Floor  [facility] [address] [phone]              Additional Services     Home care nursing referral       Resumption of previous Homecare RN/HHA through [facility].     Your provider has ordered resumption of home care nursing services. If you have not been contacted within 2 days of your discharge please call [facility] at: [phone]                  Further instructions from your care team       CLINICAL NUTRITION SERVICES - REASSESSMENT NOTE        Recommendations Ordered by Registered Dietitian (RD): Increase TF to goal tonight -->   Isosource 1.5 at 110 mL/hr x 14 hours (hold 1 hour before and 1 hour after Tegretol administration) to provide 1320 mL TF product (5.5 cans for home), 1980 kcal (29 kcal per kg), 90 g protein (1.3 g per kg), 232 g CHO, 20 g fiber, 1003 mL H2O   Increase flushes to 120 mL every 4 hours now that IVF has been d/c'd       Patient should administer 5.5 cans of Isosource 1.5 (or equivalent) per night at home -- run at 110 mL/hr until gone (~14-15 hours)  Flush with 120 mL before TF cycle started and once completed.  Then flush with 120 mL 4x during the day.    Pending Results     No orders found from 6/2/2017 to 6/5/2017.            Statement of Approval     Ordered          06/07/17 8370  I have reviewed and agree with all the recommendations and orders detailed in this document.  EFFECTIVE NOW     Approved and electronically signed by:  Hayden Epperson DO            "  Admission Information     Date & Time Department Dept. Phone    2017 [facility]  Oncology [phone]      Your Vitals Were     Blood Pressure Pulse Temperature Respirations Height Weight    120/79 (BP Location: Right arm) 74 97.5  F (36.4  C) (Axillary) 18 1.778 m (5' 10\") 70.7 kg (155 lb 12.8 oz)    Pulse Oximetry BMI (Body Mass Index)                93% 22.35 kg/m2          [email or web address] Information     [employer] lets you send messages to your doctor, view your test results, renew your prescriptions, schedule appointments and more. To sign up, go to [email or web address] . Click on \"Log in\" on the left side of the screen, which will take you to the Welcome page. Then click on \"Sign up Now\" on the right side of the page.     You will be asked to enter the access code listed below, as well as some personal information. Please follow the directions to create your username and password.     Your access code is: [email or web address]  Expires: 2017 10:50 AM     Your access code will  in 90 days. If you need help or a new code, please call your [facility] clinic or [phone].        Care EveryWhere ID     This is your Care EveryWhere ID. This could be used by other organizations to access your [facility] medical records  [id number]           Review of your medicines      START taking        Dose / Directions    amoxicillin-clavulanate 400-57 MG/5ML suspension   Commonly known as:  AUGMENTIN   Indication:  Pneumonia caused by Inhaling a Substance Into the Lungs   Used for:  Pneumonia of right lower lobe due to infectious organism        Dose:  500 mg   Take 6.3 mLs (500 mg) by mouth every 8 hours for 7 days   Quantity:  132.3 mL   Refills:  0       carBAMazepine 100 MG/5ML suspension   Commonly known as:  TEGretol   Used for:  Pneumonia of right lower lobe due to infectious organism   Replaces:  carBAMazepine 100 MG chewable tablet        Dose:  150 mg   Take 7.5 mLs (150 mg) by mouth every 6 hours "   Quantity:  900 mL   Refills:  0         CONTINUE these medicines which may have CHANGED, or have new prescriptions. If we are uncertain of the size of tablets/capsules you have at home, strength may be listed as something that might have changed.        Dose / Directions    * Brivaracetam 10 MG/ML solution   Commonly known as:  BRIVIACT   This may have changed:    - how much to take  - how to take this  - when to take this   Used for:  Pneumonia of right lower lobe due to infectious organism        Dose:  100 mg   10 mLs (100 mg) by Per Feeding Tube route every evening   Quantity:  300 mL   Refills:  0       * Brivaracetam 10 MG/ML solution   Commonly known as:  BRIVIACT   This may have changed:    - how to take this  - when to take this   Used for:  Pneumonia of right lower lobe due to infectious organism        Dose:  100 mg   10 mLs (100 mg) by Per Feeding Tube route every morning   Quantity:  300 mL   Refills:  0       levothyroxine 25 mcg/mL Susp   Commonly known as:  SYNTHROID   This may have changed:  additional instructions   Used for:  Panhypopituitarism (H)        Dose:  150 mcg   6 mLs (150 mcg) by Per J Tube route every morning (before breakfast)   Refills:  0       * Notice:  This list has 2 medication(s) that are the same as other medications prescribed for you. Read the directions carefully, and ask your doctor or other care provider to review them with you.      CONTINUE these medicines which have NOT CHANGED        Dose / Directions    ACETAMINOPHEN PO        Dose:  650 mg   650 mg by Per Feeding Tube route every 4 hours as needed for pain   Refills:  0       aspirin 10 mg/mL Susp   Used for:  Routine adult health maintenance        Dose:  81 mg   8.1 mLs (81 mg) by Per J Tube route daily   Refills:  0       bacitracin ointment        Apply topically daily as needed for wound care To PEG site.   Refills:  0       bisacodyl 10 MG Suppository   Commonly known as:  DULCOLAX   Used for:  Constipation,  unspecified constipation type        Dose:  10 mg   Place 1 suppository (10 mg) rectally daily as needed for constipation   Quantity:  30 suppository   Refills:  0       calcium carbonate 1250 (500 CA) MG/5ML Susp suspension   Used for:  Malnutrition (H)        Dose:  1250 mg   5 mLs (1,250 mg) by Per J Tube route 3 times daily (with meals)   Quantity:  450 mL   Refills:  0       fiber modular packet   Used for:  Necrotizing pancreatitis        Dose:  1 packet   1 packet by Per Feeding Tube route 3 times daily   Refills:  0       * hydrocortisone 2 mg/mL Susp   Commonly known as:  CORTEF   Used for:  Panhypopituitarism (H)        Dose:  20 mg   10 mLs (20 mg) by Per J Tube route every morning   Refills:  0       * hydrocortisone 2 mg/mL Susp   Commonly known as:  CORTEF   Used for:  Panhypopituitarism (H)        Dose:  10 mg   Take 5 mLs (10 mg) by mouth every evening   Refills:  0       melatonin 1 MG/ML Liqd liquid   Used for:  Insomnia, unspecified type        Dose:  6 mg   6 mLs (6 mg) by Per J Tube route every evening   Refills:  0       miconazole 2 % powder   Commonly known as:  MICATIN; MICRO GUARD   Used for:  Rash        Apply topically every hour as needed for other (topical candidiasis)   Refills:  0       multivitamins with minerals Liqd liquid   Used for:  Necrotizing pancreatitis, Malnutrition (H)        Dose:  15 mL   15 mLs by Per J Tube route daily   Refills:  0       pantoprazole Susp suspension   Commonly known as:  PROTONIX   Used for:  Pneumonia of right lower lobe due to infectious organism        Dose:  40 mg   20 mLs (40 mg) by Per Feeding Tube route 2 times daily   Quantity:  1200 mL   Refills:  0       polyethylene glycol Packet   Commonly known as:  MIRALAX/GLYCOLAX   Used for:  Constipation, unspecified constipation type        Dose:  17 g   Take 17 g by mouth 2 times daily as needed (constipation)   Quantity:  7 packet   Refills:  0       potassium & sodium phosphates 280-160-250 MG  Packet   Commonly known as:  NEUTRA-PHOS        Dose:  1 packet   Take 1 packet by mouth 3 times daily 0900, 1500, 2100.   Refills:  0       testosterone cypionate 200 MG/ML injection   Commonly known as:  DEPOTESTOTERONE        Dose:  200 mg   Inject 200 mg into the muscle every 14 days Mondays   Refills:  0       VITAMIN D (CHOLECALCIFEROL) PO        Dose:  2000 Units   Take 2,000 Units by mouth daily   Refills:  0       * Notice:  This list has 2 medication(s) that are the same as other medications prescribed for you. Read the directions carefully, and ask your doctor or other care provider to review them with you.      STOP taking     carBAMazepine 100 MG chewable tablet   Commonly known as:  TEGretol   Replaced by:  carBAMazepine 100 MG/5ML suspension           OMEPRAZOLE PO                Where to get your medicines      These medications were sent to [facility] [place] - [address]  [address], [place] [address]     Phone:  [phone]     amoxicillin-clavulanate 400-57 MG/5ML suspension    pantoprazole Susp suspension         Some of these will need a paper prescription and others can be bought over the counter. Ask your nurse if you have questions.     Bring a paper prescription for each of these medications     Brivaracetam 10 MG/ML solution    Brivaracetam 10 MG/ML solution    carBAMazepine 100 MG/5ML suspension                Protect others around you: Learn how to safely use, store and throw away your medicines at www.disposemymeds.org.             Medication List: This is a list of all your medications and when to take them. Check marks below indicate your daily home schedule. Keep this list as a reference.      Medications           Morning Afternoon Evening Bedtime As Needed    ACETAMINOPHEN PO   650 mg by Per Feeding Tube route every 4 hours as needed for pain                                amoxicillin-clavulanate 400-57 MG/5ML suspension   Commonly known  as:  AUGMENTIN   Take 6.3 mLs (500 mg) by mouth every 8 hours for 7 days   Last time this was given:  500 mg on 6/8/2017 12:53 PM                                aspirin 10 mg/mL Susp   8.1 mLs (81 mg) by Per J Tube route daily                                bacitracin ointment   Apply topically daily as needed for wound care To PEG site.   Last time this was given:  6/8/2017  9:19 AM                                bisacodyl 10 MG Suppository   Commonly known as:  DULCOLAX   Place 1 suppository (10 mg) rectally daily as needed for constipation   Last time this was given:  10 mg on 6/5/2017 10:19 AM                                * Brivaracetam 10 MG/ML solution   Commonly known as:  BRIVIACT   10 mLs (100 mg) by Per Feeding Tube route every evening   Last time this was given:  100 mg on 6/8/2017 10:40 AM                                * Brivaracetam 10 MG/ML solution   Commonly known as:  BRIVIACT   10 mLs (100 mg) by Per Feeding Tube route every morning   Last time this was given:  100 mg on 6/8/2017 10:40 AM                                calcium carbonate 1250 (500 CA) MG/5ML Susp suspension   5 mLs (1,250 mg) by Per J Tube route 3 times daily (with meals)                                carBAMazepine 100 MG/5ML suspension   Commonly known as:  TEGretol   Take 7.5 mLs (150 mg) by mouth every 6 hours   Last time this was given:  150 mg on 6/8/2017 12:53 PM                                fiber modular packet   1 packet by Per Feeding Tube route 3 times daily                                * hydrocortisone 2 mg/mL Susp   Commonly known as:  CORTEF   10 mLs (20 mg) by Per J Tube route every morning                                * hydrocortisone 2 mg/mL Susp   Commonly known as:  CORTEF   Take 5 mLs (10 mg) by mouth every evening                                levothyroxine 25 mcg/mL Susp   Commonly known as:  SYNTHROID   6 mLs (150 mcg) by Per J Tube route every morning (before breakfast)                                 melatonin 1 MG/ML Liqd liquid   6 mLs (6 mg) by Per J Tube route every evening                                miconazole 2 % powder   Commonly known as:  MICATIN; MICRO GUARD   Apply topically every hour as needed for other (topical candidiasis)                                multivitamins with minerals Liqd liquid   15 mLs by Per J Tube route daily                                pantoprazole Susp suspension   Commonly known as:  PROTONIX   20 mLs (40 mg) by Per Feeding Tube route 2 times daily   Last time this was given:  40 mg on 6/8/2017  9:22 AM                                polyethylene glycol Packet   Commonly known as:  MIRALAX/GLYCOLAX   Take 17 g by mouth 2 times daily as needed (constipation)   Last time this was given:  17 g on 6/8/2017 12:53 PM                                potassium & sodium phosphates 280-160-250 MG Packet   Commonly known as:  NEUTRA-PHOS   Take 1 packet by mouth 3 times daily 0900, 1500, 2100.                                testosterone cypionate 200 MG/ML injection   Commonly known as:  DEPOTESTOTERONE   Inject 200 mg into the muscle every 14 days Mondays   Last time this was given:  200 mg on 6/5/2017  9:41 PM                                VITAMIN D (CHOLECALCIFEROL) PO   Take 2,000 Units by mouth daily   Last time this was given:  2,000 Units on 6/8/2017  9:06 AM                                * Notice:  This list has 4 medication(s) that are the same as other medications prescribed for you. Read the directions carefully, and ask your doctor or other care provider to review them with you.

## 2017-06-04 NOTE — IP AVS SNAPSHOT
` [place] [address] ONCOLOGY: [phone]            Medication Administration Report for Keyon Farias as of 06/08/17 1312   Legend:    Given Hold Not Given Due Canceled Entry Other Actions    Time Time (Time) Time  Time-Action       Inactive    Active    Linked        Medications 06/02/17 06/03/17 06/04/17 06/05/17 06/06/17 06/07/17 06/08/17    acetaminophen (TYLENOL) Suppository 650 mg  Dose: 650 mg Freq: EVERY 4 HOURS PRN Route: RE  PRN Reason: mild pain  Start: 06/05/17 0103   Admin Instructions: Alternate ibuprofen (if ordered) with acetaminophen.  Maximum acetaminophen dose from all sources = 75 mg/kg/day not to exceed 4 grams/day.               albuterol neb solution 2.5 mg  Dose: 2.5 mg Freq: EVERY 2 HOURS PRN Route: NEBULIZATION  PRN Reason: shortness of breath / dyspnea  Start: 06/05/17 0058              amoxicillin-clavulanate (AUGMENTIN) 400-57 MG/5ML suspension 500 mg  Dose: 500 mg Freq: EVERY 8 HOURS Route: PO  Indications of Use: ASPIRATION PNEUMONIA  Start: 06/06/17 1200   End: 06/10/17 1159        1419 (500 mg)-Given       2039 (500 mg)-Given        0451 (500 mg)-Given       1237 (500 mg)-Given       2032 (500 mg)-Given        0359 (500 mg)-Given       1253 (500 mg)-Given       [ ] 2000           aspirin chewable tablet 81 mg  Dose: 81 mg Freq: DAILY Route: PER J TUBE  Start: 06/05/17 0900   Admin Instructions: Nurse to crush tab for administration orally or per feeding tube.        1018 (81 mg)-Given        0925 (81 mg)-Given        0912 (81 mg)-Given        0906 (81 mg)-Given           bacitracin ointment  Freq: DAILY Route: Top  Start: 06/05/17 1200   Admin Instructions: Apply around PEG site.        1325 ( )-Given        0923 ( )-Given        0913 ( )-Given        0919 ( )-Given           Brivaracetam (BRIVIACT) solution 100 mg  Dose: 100 mg Freq: EVERY MORNING Route: PER FEEDING   Start: 06/06/17 0900   Admin Instructions: Patient may use own home med.<br>         6913 (103  mg)-Given        0913 (100 mg)-Given        1040 (100 mg)-Given           Brivaracetam (BRIVIACT) solution 100 mg  Dose: 100 mg Freq: EVERY EVENING Route: PER FEEDING   Start: 06/05/17 2000   Admin Instructions: Patient may use own home med.<br>        2140 (100 mg)-Given        2133 (100 mg)-Given        2032 (100 mg)-Given        [ ] 2000           carBAMazepine (TEGretol) suspension 150 mg  Dose: 150 mg Freq: EVERY 6 HOURS SCHEDULED Route: PO  Start: 06/07/17 1800   Admin Instructions: Shake well.  DO NOT need to hold TF before or after.  Dilute carbamazepine suspension 1:1 with water if administered via a feeding tube. Flush adequately after dose to minimize adherence to TF itself.          1854 (150 mg)-Given        0109 (150 mg)-Given       0633 (150 mg)-Given       1253 (150 mg)-Given       [ ] 1800           cholecalciferol (vitamin D) tablet 2,000 Units  Dose: 2,000 Units Freq: DAILY Route: ORAL OR FEED  Start: 06/05/17 1200       1325 (2,000 Units)-Given        0925 (2,000 Units)-Given        0912 (2,000 Units)-Given        0906 (2,000 Units)-Given           dextrose 10 % 1,000 mL infusion  Freq: CONTINUOUS PRN Route: IV  PRN Comment: Hypoglycemia prevention  Start: 06/06/17 1532   Admin Instructions: For Hypoglycemia Prevention for patients on long-acting subcutaneous basal insulin (Glargine, Detemir, NPH) or continuous insulin infusion. Whenever nutrition support is held or interrupted:   1) Infuse IV D10W at nutrition support rate  2) Notify provider for further instructions               docusate (COLACE) 50 MG/5ML liquid 100 mg  Dose: 100 mg Freq: 2 TIMES DAILY Route: PER J TUBE  Start: 06/08/17 0915   Admin Instructions: Should be administered in milk or fruit juice to mask the taste.  Hold for loose stools.           1040 (100 mg)-Given       [ ] 2100           heparin sodium PF injection 5,000 Units  Dose: 5,000 Units Freq: EVERY 8 HOURS Route: SC  Start: 06/05/17 0115   Admin Instructions: High  concentration HEParin. Not for line flush or cath care.        0219 (5,000 Units)-Given       1019 (5,000 Units)-Given       1731 (5,000 Units)-Given        0047 (5,000 Units)-Given       0925 (5,000 Units)-Given       1815 (5,000 Units)-Given        0020 (5,000 Units)-Given       0911 (5,000 Units)-Given       1854 (5,000 Units)-Given        0109 (5,000 Units)-Given       0917 (5,000 Units)-Given       [ ] 1700           hydrocortisone (CORTEF) tablet 10 mg  Dose: 10 mg Freq: EVERY EVENING Route: PER J TUBE  Start: 06/05/17 2000   Admin Instructions: Nurse to crush tab for administration orally or per feeding tube.<br>        2140 (10 mg)-Given        2038 (10 mg)-Given        2032 (10 mg)-Given        [ ] 2000           hydrocortisone (CORTEF) tablet 20 mg  Dose: 20 mg Freq: EVERY MORNING Route: PER J TUBE  Start: 06/05/17 0900   Admin Instructions: Nurse to crush tab for administration orally or per feeding tube.<br>        1019 (20 mg)-Given        0925 (20 mg)-Given        0912 (20 mg)-Given        0906 (20 mg)-Given           levothyroxine (SYNTHROID/LEVOTHROID) tablet 150 mcg  Dose: 150 mcg Freq: EVERY MORNING BEFORE BREAKFAST Route: PER J TUBE  Start: 06/05/17 0730   Admin Instructions: Nurse to crush tab for administration orally or per feeding tube.<br>        1018 (150 mcg)-Given        0636 (150 mcg)-Given        0642 (150 mcg)-Given        0633 (150 mcg)-Given           magnesium citrate solution 148 mL  Dose: 148 mL Freq: ONCE PRN Route: PER FEEDING   PRN Reason: constipation  Start: 06/05/17 0106   Admin Instructions: May repeat in one hour x 1 if insufficient results. Avoid in severe renal disease. Hold for loose stools.  This is the third step of a three step constipation treatment protocol.               melatonin tablet 6 mg  Dose: 6 mg Freq: EVERY EVENING Route: PER J TUBE  Start: 06/05/17 2000   Admin Instructions: Nurse to crush tab for administration orally or per feeding tube.<br>        2140  (6 mg)-Given        2038 (6 mg)-Given        2032 (6 mg)-Given        [ ] 2000           miconazole (MICATIN; MICRO GUARD) 2 % powder  Freq: EVERY 1 HOUR PRN Route: Top  PRN Reason: other  PRN Comment: topical candidiasis  Start: 06/05/17 0059   Admin Instructions: Apply to areas of redness               naloxone (NARCAN) injection 0.1-0.4 mg  Dose: 0.1-0.4 mg Freq: EVERY 2 MIN PRN Route: IV  PRN Reason: opioid reversal  Start: 06/05/17 0100   Admin Instructions: For respiratory rate LESS than or EQUAL to 8.  Partial reversal dose:  0.1 mg titrated q 2 minutes for Analgesia Side Effects Monitoring Sedation Level of 3 (frequently drowsy, arousable, drifts to sleep during conversation).Full reversal dose:  0.4 mg bolus for Analgesia Side Effects Monitoring Sedation Level of 4 (somnolent, minimal or no response to stimulation).               ondansetron (ZOFRAN-ODT) ODT tab 4 mg  Dose: 4 mg Freq: EVERY 6 HOURS PRN Route: PO  PRN Reason: nausea  Start: 06/05/17 0104   Admin Instructions: This is Step 1 of nausea and vomiting management.  If nausea not resolved in 15 minutes, go to Step 2 prochlorperazine (COMPAZINE). Do not push through foil backing. Peel back foil and gently remove. Place on tongue immediately. Administration with liquid unnecessary              Or  ondansetron (ZOFRAN) injection 4 mg  Dose: 4 mg Freq: EVERY 6 HOURS PRN Route: IV  PRN Reasons: nausea,vomiting  Start: 06/05/17 0104   Admin Instructions: This is Step 1 of nausea and vomiting management.  If nausea not resolved in 15 minutes, go to Step 2 prochlorperazine (COMPAZINE).  Irritant.               pantoprazole (PROTONIX) suspension 40 mg  Dose: 40 mg Freq: 2 TIMES DAILY Route: PER FEEDING   Start: 06/05/17 0115       0240 (40 mg)-Given       1019 (40 mg)-Given       2243 (40 mg)-Given        0925 (40 mg)-Given       2040 (40 mg)-Given        1027 (40 mg)-Given       2033 (40 mg)-Given        0922 (40 mg)-Given       [ ] 2100           Patient  is already receiving anticoagulation with heparin, enoxaparin (LOVENOX), warfarin (COUMADIN)  or other anticoagulant medication  Freq: CONTINUOUS PRN Route: XX  Start: 06/05/17 0103              polyethylene glycol (MIRALAX/GLYCOLAX) Packet 17 g  Dose: 17 g Freq: 3 TIMES DAILY Route: PO  Start: 06/05/17 1300   Admin Instructions: 1 Packet = 17 grams. Mixed prescribed dose in 8 ounces of water. Follow with 8 oz. of water.        1326 (17 g)-Given       (1912)-Not Given        (0835)-Not Given       (1332)-Not Given [C]       (1815)-Not Given        (0914)-Not Given       (1228)-Not Given       (1837)-Not Given        0906 (17 g)-Given       1253 (17 g)-Given       [ ] 1900           polyethylene glycol (MIRALAX/GLYCOLAX) Packet 17 g  Dose: 17 g Freq: DAILY PRN Route: PO  PRN Reason: constipation  Start: 06/05/17 0107   Admin Instructions: Give in 8oz of  water, juice, or soda. Hold for loose stools.  This is the second step of a three step constipation treatment protocol.  1 Packet = 17 grams. Mixed prescribed dose in 8 ounces of water. Follow with 8 oz. of water.               senna-docusate (SENOKOT-S;PERICOLACE) 8.6-50 MG per tablet 1-2 tablet  Dose: 1-2 tablet Freq: 2 TIMES DAILY PRN Route: PO  PRN Comment: constipation   Start: 06/05/17 0106   Admin Instructions: If no bowel movement in 24 hours, increase to 2 tablets PO BID.  Hold for loose stools.   This is the first step of a three step constipation treatment protocol.               testosterone cypionate (DEPOTESTOTERONE) injection 200 mg  Dose: 200 mg Freq: EVERY 14 DAYS Route: IM  Start: 06/05/17 2000   Admin Instructions: On Mondays 2141 (200 mg)-Given             Discontinued Medications  Medications 06/02/17 06/03/17 06/04/17 06/05/17 06/06/17 06/07/17 06/08/17         Rate: 100 mL/hr Freq: CONTINUOUS Route: IV  Start: 06/05/17 0115   End: 06/06/17 1813       0219 ( )-New Bag       1414 ( )-New Bag        0047 ( )-New Bag       1132 ( )-New  Bag       1813-Med Discontinued           Dose: 10 mg Freq: DAILY Route: RE  Start: 06/05/17 1000   End: 06/07/17 0859       1019 (10 mg)-Given        (0837)-Not Given [C]        0859-Med Discontinued          Dose: 50 mg Freq: EVERY MORNING Route: PER FEEDING   Start: 06/05/17 1200   End: 06/05/17 1421   Admin Instructions: Patient may use own home med.<br>        1327 (50 mg)-Given       1421-Med Discontinued            Dose: 150 mg Freq: EVERY 6 HOURS SCHEDULED Route: PO  Start: 06/06/17 1800   End: 06/07/17 1733   Admin Instructions: Shake well.         1713 (150 mg)-Given        0020 (150 mg)-Given       0628 (150 mg)-Given       1404 (150 mg)-Given       1733-Med Discontinued          Dose: 150 mg Freq: EVERY 8 HOURS SCHEDULED Route: PO  Start: 06/06/17 2200   End: 06/06/17 1438   Admin Instructions: Shake well.         1438-Med Discontinued           Dose: 150 mg Freq: EVERY 8 HOURS SCHEDULED Route: PO  Start: 06/06/17 1400   End: 06/06/17 1429   Admin Instructions: Shake well.         1419 (150 mg)-Given       1429-Med Discontinued           Dose: 200 mg Freq: EVERY 8 HOURS SCHEDULED Route: PO  Start: 06/06/17 2200   End: 06/06/17 1435   Admin Instructions: Shake well.         1435-Med Discontinued           Dose: 100 mg Freq: 2 TIMES DAILY Route: PO  Start: 06/05/17 0900   End: 06/08/17 0903   Admin Instructions: To prevent constipation.  Hold for loose stools.        1018 (100 mg)-Given       (2239)-Not Given        (0947)-Not Given [C]       (2008)-Not Given [C]        (0912)-Not Given       (2024)-Not Given               0903-Med Discontinued         Dose: 3.375 g Freq: EVERY 6 HOURS Route: IV  Indications of Use: ASPIRATION PNEUMONIA  Last Dose: 3.375 g (06/06/17 0635)  Start: 06/05/17 0500   End: 06/06/17 1156       0549 (3.375 g)-New Bag       1049 (3.375 g)-New Bag       1731 (3.375 g)-New Bag       2243 (3.375 g)-New Bag        0635 (3.375 g)-New Bag       1130 (3.375 g)-New Bag       1156-Med  Discontinued      Medications 06/02/17 06/03/17 06/04/17 06/05/17 06/06/17 06/07/17 06/08/17
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
## 2017-06-05 PROBLEM — J69.0 PNEUMONIA, ASPIRATION (H): Status: ACTIVE | Noted: 2017-06-05

## 2017-06-05 LAB
ANION GAP SERPL CALCULATED.3IONS-SCNC: 9 MMOL/L (ref 3–14)
BUN SERPL-MCNC: 16 MG/DL (ref 7–30)
CALCIUM SERPL-MCNC: 8.5 MG/DL (ref 8.5–10.1)
CHLORIDE SERPL-SCNC: 92 MMOL/L (ref 94–109)
CO2 SERPL-SCNC: 26 MMOL/L (ref 20–32)
CREAT SERPL-MCNC: 0.9 MG/DL (ref 0.66–1.25)
ERYTHROCYTE [DISTWIDTH] IN BLOOD BY AUTOMATED COUNT: 19.1 % (ref 10–15)
GFR SERPL CREATININE-BSD FRML MDRD: 87 ML/MIN/1.7M2
GLUCOSE SERPL-MCNC: 88 MG/DL (ref 70–99)
HCT VFR BLD AUTO: 39.9 % (ref 40–53)
HGB BLD-MCNC: 13.3 G/DL (ref 13.3–17.7)
LACTATE BLD-SCNC: 1.6 MMOL/L (ref 0.7–2.1)
MCH RBC QN AUTO: 24.3 PG (ref 26.5–33)
MCHC RBC AUTO-ENTMCNC: 33.3 G/DL (ref 31.5–36.5)
MCV RBC AUTO: 73 FL (ref 78–100)
PLATELET # BLD AUTO: 161 10E9/L (ref 150–450)
POTASSIUM SERPL-SCNC: 4.2 MMOL/L (ref 3.4–5.3)
RBC # BLD AUTO: 5.47 10E12/L (ref 4.4–5.9)
SODIUM SERPL-SCNC: 127 MMOL/L (ref 133–144)
WBC # BLD AUTO: 12.4 10E9/L (ref 4–11)

## 2017-06-05 PROCEDURE — 80339 ANTIEPILEPTICS NOS 1-3: CPT | Performed by: PSYCHIATRY & NEUROLOGY

## 2017-06-05 PROCEDURE — 36415 COLL VENOUS BLD VENIPUNCTURE: CPT | Performed by: INTERNAL MEDICINE

## 2017-06-05 PROCEDURE — 99207 ZZC APP CREDIT; MD BILLING SHARED VISIT: CPT | Performed by: INTERNAL MEDICINE

## 2017-06-05 PROCEDURE — 25000132 ZZH RX MED GY IP 250 OP 250 PS 637: Mod: GY | Performed by: INTERNAL MEDICINE

## 2017-06-05 PROCEDURE — 95813 EEG EXTND MNTR 61-119 MIN: CPT

## 2017-06-05 PROCEDURE — A9270 NON-COVERED ITEM OR SERVICE: HCPCS | Mod: GY | Performed by: INTERNAL MEDICINE

## 2017-06-05 PROCEDURE — 12000000 ZZH R&B MED SURG/OB

## 2017-06-05 PROCEDURE — 40000061 ZZH STATISTIC EEG TIME EA 10 MIN

## 2017-06-05 PROCEDURE — 25000128 H RX IP 250 OP 636: Performed by: INTERNAL MEDICINE

## 2017-06-05 PROCEDURE — 80156 ASSAY CARBAMAZEPINE TOTAL: CPT | Performed by: PSYCHIATRY & NEUROLOGY

## 2017-06-05 PROCEDURE — 36415 COLL VENOUS BLD VENIPUNCTURE: CPT | Performed by: PSYCHIATRY & NEUROLOGY

## 2017-06-05 PROCEDURE — A9270 NON-COVERED ITEM OR SERVICE: HCPCS | Mod: GY

## 2017-06-05 PROCEDURE — 85027 COMPLETE CBC AUTOMATED: CPT | Performed by: INTERNAL MEDICINE

## 2017-06-05 PROCEDURE — 25000132 ZZH RX MED GY IP 250 OP 250 PS 637: Mod: GY

## 2017-06-05 PROCEDURE — 83605 ASSAY OF LACTIC ACID: CPT | Performed by: INTERNAL MEDICINE

## 2017-06-05 PROCEDURE — 80339 ANTIEPILEPTICS NOS 1-3: CPT | Mod: 91 | Performed by: PSYCHIATRY & NEUROLOGY

## 2017-06-05 PROCEDURE — 99223 1ST HOSP IP/OBS HIGH 75: CPT | Mod: AI | Performed by: INTERNAL MEDICINE

## 2017-06-05 PROCEDURE — 80157 ASSAY CARBAMAZEPINE FREE: CPT | Performed by: PSYCHIATRY & NEUROLOGY

## 2017-06-05 PROCEDURE — 80048 BASIC METABOLIC PNL TOTAL CA: CPT | Performed by: INTERNAL MEDICINE

## 2017-06-05 RX ORDER — BISACODYL 10 MG
10 SUPPOSITORY, RECTAL RECTAL DAILY
Status: DISPENSED | OUTPATIENT
Start: 2017-06-05 | End: 2017-06-07

## 2017-06-05 RX ORDER — ALBUTEROL SULFATE 0.83 MG/ML
2.5 SOLUTION RESPIRATORY (INHALATION)
Status: DISCONTINUED | OUTPATIENT
Start: 2017-06-05 | End: 2017-06-08 | Stop reason: HOSPADM

## 2017-06-05 RX ORDER — POLYETHYLENE GLYCOL 3350 17 G/17G
17 POWDER, FOR SOLUTION ORAL DAILY PRN
Status: DISCONTINUED | OUTPATIENT
Start: 2017-06-05 | End: 2017-06-08 | Stop reason: HOSPADM

## 2017-06-05 RX ORDER — AMOXICILLIN 250 MG
1-2 CAPSULE ORAL 2 TIMES DAILY PRN
Status: DISCONTINUED | OUTPATIENT
Start: 2017-06-05 | End: 2017-06-08 | Stop reason: HOSPADM

## 2017-06-05 RX ORDER — HYDROCORTISONE 10 MG/1
10 TABLET ORAL EVERY EVENING
Status: DISCONTINUED | OUTPATIENT
Start: 2017-06-05 | End: 2017-06-08 | Stop reason: HOSPADM

## 2017-06-05 RX ORDER — HYDROCORTISONE 10 MG/1
10 TABLET ORAL EVERY EVENING
Status: DISCONTINUED | OUTPATIENT
Start: 2017-06-05 | End: 2017-06-05

## 2017-06-05 RX ORDER — DOCUSATE SODIUM 100 MG/1
100 CAPSULE, LIQUID FILLED ORAL 2 TIMES DAILY
Status: DISCONTINUED | OUTPATIENT
Start: 2017-06-05 | End: 2017-06-08

## 2017-06-05 RX ORDER — PIPERACILLIN SODIUM, TAZOBACTAM SODIUM 3; .375 G/15ML; G/15ML
3.38 INJECTION, POWDER, LYOPHILIZED, FOR SOLUTION INTRAVENOUS EVERY 6 HOURS
Status: DISCONTINUED | OUTPATIENT
Start: 2017-06-05 | End: 2017-06-06

## 2017-06-05 RX ORDER — HYDROCORTISONE 20 MG/1
20 TABLET ORAL EVERY MORNING
Status: DISCONTINUED | OUTPATIENT
Start: 2017-06-05 | End: 2017-06-08 | Stop reason: HOSPADM

## 2017-06-05 RX ORDER — DESMOPRESSIN ACETATE 0.1 MG/1
100 TABLET ORAL EVERY 8 HOURS
Status: DISCONTINUED | OUTPATIENT
Start: 2017-06-05 | End: 2017-06-05

## 2017-06-05 RX ORDER — BACITRACIN ZINC 500 [USP'U]/G
OINTMENT TOPICAL DAILY PRN
COMMUNITY
End: 2022-12-01

## 2017-06-05 RX ORDER — ONDANSETRON 4 MG/1
4 TABLET, ORALLY DISINTEGRATING ORAL EVERY 6 HOURS PRN
Status: DISCONTINUED | OUTPATIENT
Start: 2017-06-05 | End: 2017-06-08 | Stop reason: HOSPADM

## 2017-06-05 RX ORDER — ONDANSETRON 2 MG/ML
4 INJECTION INTRAMUSCULAR; INTRAVENOUS EVERY 6 HOURS PRN
Status: DISCONTINUED | OUTPATIENT
Start: 2017-06-05 | End: 2017-06-08 | Stop reason: HOSPADM

## 2017-06-05 RX ORDER — ASPIRIN 81 MG/1
81 TABLET, CHEWABLE ORAL DAILY
Status: DISCONTINUED | OUTPATIENT
Start: 2017-06-05 | End: 2017-06-08 | Stop reason: HOSPADM

## 2017-06-05 RX ORDER — HEPARIN SODIUM 5000 [USP'U]/.5ML
5000 INJECTION, SOLUTION INTRAVENOUS; SUBCUTANEOUS EVERY 8 HOURS
Status: DISCONTINUED | OUTPATIENT
Start: 2017-06-05 | End: 2017-06-08 | Stop reason: HOSPADM

## 2017-06-05 RX ORDER — ACETAMINOPHEN 650 MG/1
650 SUPPOSITORY RECTAL EVERY 4 HOURS PRN
Status: DISCONTINUED | OUTPATIENT
Start: 2017-06-05 | End: 2017-06-08 | Stop reason: HOSPADM

## 2017-06-05 RX ORDER — SODIUM BICARBONATE 325 MG/1
325 TABLET ORAL EVERY 4 HOURS
Status: DISCONTINUED | OUTPATIENT
Start: 2017-06-05 | End: 2017-06-05

## 2017-06-05 RX ORDER — MAGNESIUM CARB/ALUMINUM HYDROX 105-160MG
148 TABLET,CHEWABLE ORAL
Status: DISCONTINUED | OUTPATIENT
Start: 2017-06-05 | End: 2017-06-08 | Stop reason: HOSPADM

## 2017-06-05 RX ORDER — TESTOSTERONE CYPIONATE 200 MG/ML
200 INJECTION, SOLUTION INTRAMUSCULAR WEEKLY
Status: DISCONTINUED | OUTPATIENT
Start: 2017-06-09 | End: 2017-06-05 | Stop reason: CLARIF

## 2017-06-05 RX ORDER — NALOXONE HYDROCHLORIDE 0.4 MG/ML
.1-.4 INJECTION, SOLUTION INTRAMUSCULAR; INTRAVENOUS; SUBCUTANEOUS
Status: DISCONTINUED | OUTPATIENT
Start: 2017-06-05 | End: 2017-06-08 | Stop reason: HOSPADM

## 2017-06-05 RX ORDER — LANOLIN ALCOHOL/MO/W.PET/CERES
6 CREAM (GRAM) TOPICAL EVERY EVENING
Status: DISCONTINUED | OUTPATIENT
Start: 2017-06-05 | End: 2017-06-08 | Stop reason: HOSPADM

## 2017-06-05 RX ORDER — SODIUM CHLORIDE 9 MG/ML
INJECTION, SOLUTION INTRAVENOUS CONTINUOUS
Status: DISCONTINUED | OUTPATIENT
Start: 2017-06-05 | End: 2017-06-06

## 2017-06-05 RX ORDER — LEVOTHYROXINE SODIUM 150 UG/1
150 TABLET ORAL
Status: DISCONTINUED | OUTPATIENT
Start: 2017-06-05 | End: 2017-06-08 | Stop reason: HOSPADM

## 2017-06-05 RX ORDER — POLYETHYLENE GLYCOL 3350 17 G/17G
17 POWDER, FOR SOLUTION ORAL 3 TIMES DAILY
Status: DISCONTINUED | OUTPATIENT
Start: 2017-06-05 | End: 2017-06-08 | Stop reason: HOSPADM

## 2017-06-05 RX ORDER — GINSENG 100 MG
CAPSULE ORAL DAILY
Status: DISCONTINUED | OUTPATIENT
Start: 2017-06-05 | End: 2017-06-08 | Stop reason: HOSPADM

## 2017-06-05 RX ORDER — POLYETHYLENE GLYCOL 3350 17 G/17G
17 POWDER, FOR SOLUTION ORAL 2 TIMES DAILY
Status: DISCONTINUED | OUTPATIENT
Start: 2017-06-05 | End: 2017-06-05

## 2017-06-05 RX ORDER — TESTOSTERONE CYPIONATE 200 MG/ML
200 INJECTION, SOLUTION INTRAMUSCULAR
Status: DISCONTINUED | OUTPATIENT
Start: 2017-06-05 | End: 2017-06-08 | Stop reason: HOSPADM

## 2017-06-05 RX ORDER — BISACODYL 10 MG
10 SUPPOSITORY, RECTAL RECTAL DAILY PRN
Status: DISCONTINUED | OUTPATIENT
Start: 2017-06-05 | End: 2017-06-05

## 2017-06-05 RX ADMIN — SODIUM BICARBONATE 325 MG: 325 TABLET ORAL at 05:50

## 2017-06-05 RX ADMIN — VITAMIN D, TAB 1000IU (100/BT) 2000 UNITS: 25 TAB at 13:25

## 2017-06-05 RX ADMIN — TESTOSTERONE CYPIONATE 200 MG: 200 INJECTION, SOLUTION INTRAMUSCULAR at 21:41

## 2017-06-05 RX ADMIN — LEVOTHYROXINE SODIUM 150 MCG: 150 TABLET ORAL at 10:18

## 2017-06-05 RX ADMIN — POLYETHYLENE GLYCOL 3350 17 G: 17 POWDER, FOR SOLUTION ORAL at 02:19

## 2017-06-05 RX ADMIN — HEPARIN SODIUM 5000 UNITS: 5000 INJECTION, SOLUTION INTRAVENOUS; SUBCUTANEOUS at 02:19

## 2017-06-05 RX ADMIN — DESMOPRESSIN ACETATE 100 MCG: 0.1 TABLET ORAL at 02:41

## 2017-06-05 RX ADMIN — HYDROCORTISONE 10 MG: 10 TABLET ORAL at 21:40

## 2017-06-05 RX ADMIN — SODIUM BICARBONATE 325 MG: 325 TABLET ORAL at 10:18

## 2017-06-05 RX ADMIN — Medication 40 MG: at 22:43

## 2017-06-05 RX ADMIN — PIPERACILLIN SODIUM,TAZOBACTAM SODIUM 3.38 G: 3; .375 INJECTION, POWDER, FOR SOLUTION INTRAVENOUS at 10:49

## 2017-06-05 RX ADMIN — HYDROCORTISONE 20 MG: 20 TABLET ORAL at 10:19

## 2017-06-05 RX ADMIN — POLYETHYLENE GLYCOL 3350 17 G: 17 POWDER, FOR SOLUTION ORAL at 13:26

## 2017-06-05 RX ADMIN — HEPARIN SODIUM 5000 UNITS: 5000 INJECTION, SOLUTION INTRAVENOUS; SUBCUTANEOUS at 17:31

## 2017-06-05 RX ADMIN — PIPERACILLIN SODIUM,TAZOBACTAM SODIUM 3.38 G: 3; .375 INJECTION, POWDER, FOR SOLUTION INTRAVENOUS at 22:43

## 2017-06-05 RX ADMIN — PIPERACILLIN SODIUM,TAZOBACTAM SODIUM 3.38 G: 3; .375 INJECTION, POWDER, FOR SOLUTION INTRAVENOUS at 17:31

## 2017-06-05 RX ADMIN — Medication 40 MG: at 02:40

## 2017-06-05 RX ADMIN — DESMOPRESSIN ACETATE 100 MCG: 0.1 TABLET ORAL at 10:18

## 2017-06-05 RX ADMIN — HEPARIN SODIUM 5000 UNITS: 5000 INJECTION, SOLUTION INTRAVENOUS; SUBCUTANEOUS at 10:19

## 2017-06-05 RX ADMIN — BISACODYL 10 MG: 10 SUPPOSITORY RECTAL at 10:19

## 2017-06-05 RX ADMIN — BACITRACIN: 500 OINTMENT TOPICAL at 13:25

## 2017-06-05 RX ADMIN — DOCUSATE SODIUM 100 MG: 100 CAPSULE, LIQUID FILLED ORAL at 10:18

## 2017-06-05 RX ADMIN — Medication 40 MG: at 10:19

## 2017-06-05 RX ADMIN — ASPIRIN 81 MG 81 MG: 81 TABLET ORAL at 10:18

## 2017-06-05 RX ADMIN — SODIUM CHLORIDE: 9 INJECTION, SOLUTION INTRAVENOUS at 02:19

## 2017-06-05 RX ADMIN — SODIUM BICARBONATE 325 MG: 325 TABLET ORAL at 02:40

## 2017-06-05 RX ADMIN — SODIUM CHLORIDE: 9 INJECTION, SOLUTION INTRAVENOUS at 14:14

## 2017-06-05 RX ADMIN — MELATONIN 6 MG: 3 TAB ORAL at 21:40

## 2017-06-05 RX ADMIN — PIPERACILLIN SODIUM,TAZOBACTAM SODIUM 3.38 G: 3; .375 INJECTION, POWDER, FOR SOLUTION INTRAVENOUS at 05:49

## 2017-06-05 ASSESSMENT — ENCOUNTER SYMPTOMS
FATIGUE: 1
CONFUSION: 1
FEVER: 0
ABDOMINAL PAIN: 0

## 2017-06-05 NOTE — PLAN OF CARE
Problem: Goal Outcome Summary  Goal: Goal Outcome Summary     SLP - EPIC notes were reviewed, and RN was consulted.  Plan to defer a swallow evaluation at this time given patient's longstanding hx of mod-severe to severe dysphagia with high aspiration risk for all po intake (see last SLP note from 7/25/16).  Patient's mother has stated wishes in the past to continue po intake despite high aspiration risk.  Note patient now has a G tube.  Family is not present this am to provide information regarding po intake for pleasure; however, patient will continue to be a high risk for aspiration with all po intake per baseline dysphagia.  Will check patient status/EPIC notes on 6/6 prior to discontinuing SLP orders to determine if additional information desired per MD/family.
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
## 2017-06-05 NOTE — PROGRESS NOTES
[facility] Hospitalist Progress Note    Date of Service (when I saw the patient): 06/05/2017  (nonbilling as patient was admitted earlier today)  Keyon Farias is a 56 year old male who was admitted on 6/4/2017.     Assessment & Plan    1.  Aspiration pneumonia:  The patient has a long history of dysphagia and aspiration risk.  Does receive p.o. for pleasure at times.  Patient is afebrile, borderline leukocytosis and normal O2 sat, chest x-ray has questionable small infiltrate, with patient's new mental status changes and risk for aspiration he will be ill be treated with Zosyn.   Holding further tube feeds until he is having bowel movements.      2.  Constipation:  The patient has not had any bowel movements for 4 days despite being on continuous tube feeds.   Will initiate MiraLAX three times a day until results     3.  Seizure disorder with Tegretol toxicity:   Neurology consultation appreciated as they did a follow-up EEG and optimizing his seizure medication regimen.  Plan daily Tegretol levels.  Will optimize his Briviact.     4.  Chronic dysphagia on tube feeds:  Hold  Creon.  The patient's mother is also interested in cyclic feeding instead of continuous feeding, and so we may need to get our nutritionist to help with respect to bolus feeding, but this will need to wait until the patient is clearly having bowel movements again.     5.  History of panhypopituitarism:  The patient will be continued on his Cortef and DDAVP.       DVT Prophylaxis: Pneumatic Compression Devices  Code Status: Full Code    Disposition:  Expected discharge in 2-3 days once mental status has returned to baseline and bowels moving with reinstitution of tube feeding.    Gael Romero MD      Interval History   Tonight patient feels better, denies shortness of breath abdominal pain or nausea.    -Data reviewed today: I reviewed all new labs and imaging results over the last 24 hours.    Physical Exam   Temp: 98.8  F  (37.1  C) Temp src: Oral BP: 135/64 Pulse: 78 Heart Rate: 63 Resp: 16 SpO2: 95 % O2 Device: None (Room air)    Vitals:    06/05/17 0613   Weight: 70 kg (154 lb 5.2 oz)     Vital Signs with Ranges  Temp:  [97.4  F (36.3  C)-98.9  F (37.2  C)] 98.8  F (37.1  C)  Pulse:  [76-78] 78  Heart Rate:  [] 63  Resp:  [12-22] 16  BP: (106-148)/(54-95) 135/64  SpO2:  [92 %-96 %] 95 %  I/O last 3 completed shifts:  In: 349 [I.V.:349]  Out: -     LUNGS:  He has coarse breath sounds in both lung fields, greater on the right.   CARDIOVASCULAR:  S1, S2, regular rate and rhythm.   ABDOMEN:  He has a G-tube in place.  Abdomen is soft.   EXTREMITIES:  Shows no edema and atrophy.   NEUROLOGIC:  He is nonverbal, follows some commands, he does move his 4 extremities.  He has spasticity.     Medications     - MEDICATION INSTRUCTIONS -       NaCl 100 mL/hr at 06/05/17 1414       heparin sodium PF  5,000 Units Subcutaneous Q8H     hydrocortisone  20 mg Per J Tube QAM     levothyroxine  150 mcg Per J Tube QAM AC     melatonin  6 mg Per J Tube QPM     pantoprazole  40 mg Per Feeding Tube BID     piperacillin-tazobactam  3.375 g Intravenous Q6H     docusate sodium  100 mg Oral BID     aspirin  81 mg Per J Tube Daily     bisacodyl  10 mg Rectal Daily     polyethylene glycol  17 g Oral TID     Brivaracetam  100 mg Per Feeding Tube QPM     cholecalciferol  2,000 Units Oral or Feeding Tube Daily     bacitracin   Topical Daily     testosterone cypionate  200 mg Intramuscular Q14 Days     hydrocortisone  10 mg Per J Tube QPM     [START ON 6/6/2017] Brivaracetam  100 mg Per Feeding Tube QAM       Data     Recent Labs  Lab 06/05/17  0640 06/04/17  2051   WBC 12.4* 11.2*   HGB 13.3 14.2    264   * 126*   POTASSIUM 4.2 4.1   BUN 16 13   CR 0.90 0.77   STEVE 8.5 8.9   GLC 88 95       Imaging:   Recent Results (from the past 24 hour(s))   XR Chest 2 Views    Narrative    CHEST TWO VIEWS   6/4/2017 9:19 PM     HISTORY: Cough, concern for  possible pneumonia (right lower lobe  crackles auscultated).    COMPARISON: 1/30/2017.      Impression    IMPRESSION: Questionable minimal infiltrate in the right lung base.  Left lung is clear. Heart size and pulmonary vasculature are normal.    KADE JENSEN MD   Abdomen XR 1 vw    Narrative    ABDOMEN ONE VIEW   6/4/2017  10:14 PM     HISTORY: Question constipation.    COMPARISON: CT abdomen and pelvis 2/6/2017.      Impression    IMPRESSION: Multiple cyst gastrostomy tubes identified in place at the  left abdomen. Percutaneous gastrostomy also noted. No evidence for  bowel obstruction. A few gas distended small bowel loops could  represent a mild degree of ileus. No free air seen on the provided  images.    ZAIRE HILL MD
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
## 2017-06-05 NOTE — ED NOTES
Bed: ED24  Expected date: 6/4/17  Expected time: 8:30 PM  Means of arrival: Ambulance  Comments:  Kerri JONES 56M constipation

## 2017-06-05 NOTE — PHARMACY-ADMISSION MEDICATION HISTORY
Admission medication history interview status for the 6/4/2017  admission is complete. See EPIC admission navigator for prior to admission medications     Medication history source reliability:Good    Actions taken by pharmacist (provider contacted, etc): Interviewed patient's Mother who cares for patient at home regarding the patient's medications.      Additional medication history information not noted on PTA med list :None    Medication reconciliation/reorder completed by provider prior to medication history? Yes    Time spent in this activity:  45 minutes    Prior to Admission medications    Medication Sig Last Dose Taking? Auth Provider   Brivaracetam (BRIVIACT) 10 MG/ML solution Take 50 mg by mouth every morning  6/4/2017 at am Yes Unknown, Entered By History   Brivaracetam (BRIVIACT) 10 MG/ML solution Take 100 mg by mouth every evening 6/3/2017 at pm Yes Unknown, Entered By History   OMEPRAZOLE PO Take 20 mg by mouth 2 times daily (before meals) 6/4/2017 at am Yes Unknown, Entered By History   potassium & sodium phosphates (NEUTRA-PHOS) 280-160-250 MG Packet Take 1 packet by mouth 3 times daily 0900, 1500, 2100. 6/4/2017 at 1000 Yes Unknown, Entered By History   VITAMIN D, CHOLECALCIFEROL, PO Take 2,000 Units by mouth daily 6/4/2017 at am Yes Unknown, Entered By History   bacitracin ointment Apply topically daily as needed for wound care To PEG site. 6/4/2017 at Unknown time Yes Unknown, Entered By History   melatonin 1 MG/ML LIQD liquid 6 mLs (6 mg) by Per J Tube route every evening 6/3/2017 at 2200 Yes Mariana Venegas MD   aspirin 10 mg/mL 8.1 mLs (81 mg) by Per J Tube route daily 6/4/2017 at am Yes Mariana Venegas MD   multivitamins with minerals (CERTAVITE/CEROVITE) LIQD liquid 15 mLs by Per J Tube route daily 6/4/2017 at am Yes Mariana Venegas MD   carBAMazepine (TEGRETOL) 100 MG chewable tablet 2 tablets (200 mg) by Per J Tube route 4 times daily 0600, 1100, 1500, 1900.  Administer 1 hour before and 1 hour after tube feeding  Patient taking differently: 200 mg by Per J Tube route 4 times daily Hold tube feeding 1 hour prior and 1 hour after medication administration.  Give medication at 0700, 1300, 1900, 2300. 6/4/2017 at 1800 Yes Mariana Venegas MD   fiber modular (NUTRISOURCE FIBER) packet 1 packet by Per Feeding Tube route 3 times daily 6/4/2017 at am Yes Mariana Venegas MD   calcium carbonate 1250 (500 CA) MG/5ML SUSP suspension 5 mLs (1,250 mg) by Per J Tube route 3 times daily (with meals) 6/4/2017 at 1500 Yes Mariana Venegas MD   levothyroxine (SYNTHROID) 25 mcg/mL 6 mLs (150 mcg) by Per J Tube route every morning (before breakfast)  Patient taking differently: 150 mcg by Per J Tube route every morning (before breakfast) Hold tube feeding 1 hour prior and 1 hour after medication administered. 6/4/2017 at am Yes Mariana Venegas MD   hydrocortisone (CORTEF) 2 mg/mL 10 mLs (20 mg) by Per J Tube route every morning 6/4/2017 at am Yes Mariana Venegas MD   hydrocortisone (CORTEF) 2 mg/mL Take 5 mLs (10 mg) by mouth every evening 6/3/2017 at pm Yes Mariana Venegas MD   bisacodyl (DULCOLAX) 10 MG Suppository Place 1 suppository (10 mg) rectally daily as needed for constipation Past Month at Unknown time Yes Mariana Venegas MD   miconazole (MICATIN; MICRO GUARD) 2 % powder Apply topically every hour as needed for other (topical candidiasis) Past Month at Unknown time Yes Alicia Roca APRN CNP   polyethylene glycol (MIRALAX/GLYCOLAX) Packet Take 17 g by mouth 2 times daily as needed (constipation) 6/4/2017 at 10am Yes Alicia Roca APRN CNP   ACETAMINOPHEN  mg by Per Feeding Tube route every 4 hours as needed for pain Past Week at Unknown time Yes Unknown, Entered By History   testosterone cypionate (DEPOTESTOTERONE CYPIONATE) 200 MG/ML injection Inject 200 mg into the muscle every 14 days , on  Mondays 5/22/2017 Yes Unknown, Entered By History       Liz Rodriguez, PharmD, BCPS

## 2017-06-05 NOTE — PLAN OF CARE
Problem: Patient Care Overview (Adult)  Goal: Plan of Care Review  Outcome: No Change   Answers with yes/no answers. LS - LLL is diminished, RML has fine crackles, and RLL has coarse crackles. Red blanchable coccyx using reposition and barrier cream q2H. NPO except for medications. NS infusing with intermittent Abx. VSS. Sodium labs were low at 126 and Carbemazepine is elevated at 26.8. Neurology consulted. Will continue to monitor.
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
## 2017-06-05 NOTE — ED NOTES
"[facility]  ED Nurse Handoff Report    ED Chief complaint: Fatigue (pt \"not himself\" per mother (primary caregiver). difficult to assess 2/2 TBI hx, had an infection last time this happened)      ED Diagnosis:   Final diagnoses:   Pneumonia of right lower lobe due to infectious organism - Suspect aspiration   Non-intractable vomiting with nausea, unspecified vomiting type   Hyponatremia   Elevated Tegretol level       Code Status: Full Code in ER, to be addressed by admitting MD    Allergies:   Allergies   Allergen Reactions     Dilantin [Phenytoin Sodium]      Valproic Acid      Toxicity c bone marrow suspension, elevated ammonia levels        Activity level - Baseline/Home:  Total Care    Activity Level - Current:   Total Care     Needed?: No    Isolation: Yes  Infection: VRE    Bariatric?: No    Vital Signs:   Vitals:    06/04/17 2133 06/04/17 2145 06/04/17 2200 06/04/17 2245   BP: (!) 148/95      Pulse:       Resp: 16 16 20 16   Temp:       TempSrc:       SpO2: 96% 96% 95%        Cardiac Rhythm: ,   Cardiac  Cardiac Rhythm: Normal sinus rhythm    Pain level:      Is this patient confused?: Yes    Patient Report: Initial Complaint: fatigue/ \"not self\" per primary caregiver (mom)  Focused Assessment: hypertensive to 140's/90's, otherwise VSS on R/A. Cardiac sounds WNL, Tele NSR. Lung sounds exhibit coarse crackles in RLL, coarse congested coughing, gurgling respirations at times, otherwise LS diminished throughout. Pt has baseline TBI neuro deficits including RUE/RLE hemiparesis with RUE contraction and RUE/RLE muscular atrophy noted. Unable to vocalize concerns, can answer yes/no questions with head gestures. Large emesis x2, zofran given with good effect.   Tests Performed:  Labs, tegretol level, CXR, Abd XR,   Abnormal Results: WBC 11.2, Na 126, Carbamazepine level 26.8, CXR shows possible R infiltrate, Abd XR shows possible ileus  Treatments provided: zofran 4 mg IV x1, Zosyn 4.5 g " IV infusing    Family Comments:     OBS brochure/video discussed/provided to patient: N/A    ED Medications:   Medications   piperacillin-tazobactam (ZOSYN) 4.5 g vial to attach to  mL bag (4.5 g Intravenous New Bag 6/4/17 2308)   ondansetron (ZOFRAN) injection 4 mg (4 mg Intravenous Given 6/4/17 2235)       Drips infusing?:  No      ED NURSE PHONE NUMBER: [phone]

## 2017-06-05 NOTE — ED NOTES
Pt had 2 large brown emesis episodes, suctioned to maintain airway. Sheets changed, nausea medication to be given.

## 2017-06-05 NOTE — PLAN OF CARE
Problem: Goal Outcome Summary  Goal: Goal Outcome Summary  Outcome: Improving  Pt. A/O but confused. Answers with yes/no answers. No signs of pain. LS - LLL is diminished, RML fine crackles, and RLL coarse crackles. Red blanchable coccyx using reposition and barrier cream q2H. NPO except for medications through G tube. NS infusing with intermittent Abx. VSS on RA. Holding tegretol, Briviact dose increased from 50 to 100 mg. Sodium labs were low at 127. Neurology consulted. Nurse will continue to monitor.

## 2017-06-06 ENCOUNTER — APPOINTMENT (OUTPATIENT)
Dept: SPEECH THERAPY | Facility: CLINIC | Age: 57
DRG: 177 | End: 2017-06-06
Payer: MEDICARE

## 2017-06-06 PROBLEM — G93.40 ENCEPHALOPATHY: Status: ACTIVE | Noted: 2017-06-06

## 2017-06-06 LAB
ANION GAP SERPL CALCULATED.3IONS-SCNC: 6 MMOL/L (ref 3–14)
BUN SERPL-MCNC: 15 MG/DL (ref 7–30)
CALCIUM SERPL-MCNC: 7.5 MG/DL (ref 8.5–10.1)
CARBAMAZEPINE EP FREE SERPL-MCNC: 2.6 UG/ML
CARBAMAZEPINE EP SERPL-MCNC: 5.5 UG/ML
CARBAMAZEPINE FREE SERPL-MCNC: 3.3 UG/ML
CARBAMAZEPINE SERPL-MCNC: 13.7 UG/ML
CARBAMAZEPINE SERPL-MCNC: 9 MG/L (ref 4–12)
CHLORIDE SERPL-SCNC: 98 MMOL/L (ref 94–109)
CO2 SERPL-SCNC: 24 MMOL/L (ref 20–32)
CREAT SERPL-MCNC: 1.01 MG/DL (ref 0.66–1.25)
GFR SERPL CREATININE-BSD FRML MDRD: 77 ML/MIN/1.7M2
GLUCOSE SERPL-MCNC: 85 MG/DL (ref 70–99)
POTASSIUM SERPL-SCNC: 4.2 MMOL/L (ref 3.4–5.3)
PROCALCITONIN SERPL-MCNC: NORMAL NG/ML
SODIUM SERPL-SCNC: 128 MMOL/L (ref 133–144)

## 2017-06-06 PROCEDURE — 25000128 H RX IP 250 OP 636: Performed by: INTERNAL MEDICINE

## 2017-06-06 PROCEDURE — A9270 NON-COVERED ITEM OR SERVICE: HCPCS | Mod: GY | Performed by: PSYCHIATRY & NEUROLOGY

## 2017-06-06 PROCEDURE — G8996 SWALLOW CURRENT STATUS: HCPCS | Mod: GN,CM

## 2017-06-06 PROCEDURE — 25000132 ZZH RX MED GY IP 250 OP 250 PS 637: Mod: GY | Performed by: PSYCHIATRY & NEUROLOGY

## 2017-06-06 PROCEDURE — 36415 COLL VENOUS BLD VENIPUNCTURE: CPT | Performed by: PSYCHIATRY & NEUROLOGY

## 2017-06-06 PROCEDURE — 40000225 ZZH STATISTIC SLP WARD VISIT

## 2017-06-06 PROCEDURE — 80156 ASSAY CARBAMAZEPINE TOTAL: CPT | Performed by: PSYCHIATRY & NEUROLOGY

## 2017-06-06 PROCEDURE — 99233 SBSQ HOSP IP/OBS HIGH 50: CPT | Performed by: INTERNAL MEDICINE

## 2017-06-06 PROCEDURE — 80048 BASIC METABOLIC PNL TOTAL CA: CPT | Performed by: PSYCHIATRY & NEUROLOGY

## 2017-06-06 PROCEDURE — 84145 PROCALCITONIN (PCT): CPT | Performed by: PSYCHIATRY & NEUROLOGY

## 2017-06-06 PROCEDURE — G8997 SWALLOW GOAL STATUS: HCPCS | Mod: GN,CM

## 2017-06-06 PROCEDURE — 92610 EVALUATE SWALLOWING FUNCTION: CPT | Mod: GN

## 2017-06-06 PROCEDURE — 25000132 ZZH RX MED GY IP 250 OP 250 PS 637: Mod: GY

## 2017-06-06 PROCEDURE — 12000000 ZZH R&B MED SURG/OB

## 2017-06-06 PROCEDURE — A9270 NON-COVERED ITEM OR SERVICE: HCPCS | Mod: GY | Performed by: INTERNAL MEDICINE

## 2017-06-06 PROCEDURE — 25000132 ZZH RX MED GY IP 250 OP 250 PS 637: Mod: GY | Performed by: INTERNAL MEDICINE

## 2017-06-06 PROCEDURE — 92526 ORAL FUNCTION THERAPY: CPT | Mod: GN

## 2017-06-06 PROCEDURE — A9270 NON-COVERED ITEM OR SERVICE: HCPCS | Mod: GY

## 2017-06-06 RX ORDER — CARBAMAZEPINE 100 MG/5ML
150 SUSPENSION ORAL EVERY 8 HOURS SCHEDULED
Status: DISCONTINUED | OUTPATIENT
Start: 2017-06-06 | End: 2017-06-06

## 2017-06-06 RX ORDER — CARBAMAZEPINE 100 MG/5ML
200 SUSPENSION ORAL EVERY 8 HOURS SCHEDULED
Status: DISCONTINUED | OUTPATIENT
Start: 2017-06-06 | End: 2017-06-06

## 2017-06-06 RX ORDER — AMOXICILLIN AND CLAVULANATE POTASSIUM 400; 57 MG/5ML; MG/5ML
500 POWDER, FOR SUSPENSION ORAL EVERY 8 HOURS
Status: DISCONTINUED | OUTPATIENT
Start: 2017-06-06 | End: 2017-06-08 | Stop reason: HOSPADM

## 2017-06-06 RX ORDER — CARBAMAZEPINE 100 MG/5ML
150 SUSPENSION ORAL EVERY 6 HOURS SCHEDULED
Status: DISCONTINUED | OUTPATIENT
Start: 2017-06-06 | End: 2017-06-07

## 2017-06-06 RX ADMIN — HEPARIN SODIUM 5000 UNITS: 5000 INJECTION, SOLUTION INTRAVENOUS; SUBCUTANEOUS at 09:25

## 2017-06-06 RX ADMIN — HEPARIN SODIUM 5000 UNITS: 5000 INJECTION, SOLUTION INTRAVENOUS; SUBCUTANEOUS at 00:47

## 2017-06-06 RX ADMIN — AMOXICILLIN AND CLAVULANATE POTASSIUM 500 MG: 400; 57 POWDER, FOR SUSPENSION ORAL at 20:39

## 2017-06-06 RX ADMIN — CARBAMAZEPINE 150 MG: 100 SUSPENSION ORAL at 14:19

## 2017-06-06 RX ADMIN — BACITRACIN: 500 OINTMENT TOPICAL at 09:23

## 2017-06-06 RX ADMIN — LEVOTHYROXINE SODIUM 150 MCG: 150 TABLET ORAL at 06:36

## 2017-06-06 RX ADMIN — HYDROCORTISONE 20 MG: 20 TABLET ORAL at 09:25

## 2017-06-06 RX ADMIN — ASPIRIN 81 MG 81 MG: 81 TABLET ORAL at 09:25

## 2017-06-06 RX ADMIN — Medication 40 MG: at 09:25

## 2017-06-06 RX ADMIN — MELATONIN 6 MG: 3 TAB ORAL at 20:38

## 2017-06-06 RX ADMIN — PIPERACILLIN SODIUM,TAZOBACTAM SODIUM 3.38 G: 3; .375 INJECTION, POWDER, FOR SOLUTION INTRAVENOUS at 06:35

## 2017-06-06 RX ADMIN — HYDROCORTISONE 10 MG: 10 TABLET ORAL at 20:38

## 2017-06-06 RX ADMIN — CARBAMAZEPINE 150 MG: 100 SUSPENSION ORAL at 17:13

## 2017-06-06 RX ADMIN — VITAMIN D, TAB 1000IU (100/BT) 2000 UNITS: 25 TAB at 09:25

## 2017-06-06 RX ADMIN — PIPERACILLIN SODIUM,TAZOBACTAM SODIUM 3.38 G: 3; .375 INJECTION, POWDER, FOR SOLUTION INTRAVENOUS at 11:30

## 2017-06-06 RX ADMIN — HEPARIN SODIUM 5000 UNITS: 5000 INJECTION, SOLUTION INTRAVENOUS; SUBCUTANEOUS at 18:15

## 2017-06-06 RX ADMIN — SODIUM CHLORIDE: 9 INJECTION, SOLUTION INTRAVENOUS at 00:47

## 2017-06-06 RX ADMIN — SODIUM CHLORIDE: 9 INJECTION, SOLUTION INTRAVENOUS at 11:32

## 2017-06-06 RX ADMIN — Medication 40 MG: at 20:40

## 2017-06-06 RX ADMIN — AMOXICILLIN AND CLAVULANATE POTASSIUM 500 MG: 400; 57 POWDER, FOR SUSPENSION ORAL at 14:19

## 2017-06-06 NOTE — PROGRESS NOTES
SPIRITUAL HEALTH SERVICES Progress Note  [facility]    Met with pt and his mother.  Pt was alert, but is largely non-verbal, so I talked with pt's mother.  He has been sick for quite some time (at least 9 months), and pt's shared some of that journey with me.  Per chart, pt was seen by priest hdz at the , and I offered a visit from Fr. Rose here at [facility].  Pt gave me a thumbs up in agreement, so I left a request for Fr. Rose.  Pt's mother was appreciative of conversation, as she acknowledged feelings of exhaustion regarding the pt's on-going health issues.  I engaged in listening and provided support, and will continue to be available per need or request.                                                                                                                                                 Suki Kang M.A.  Staff   Pager [phone]  Phone [phone]

## 2017-06-06 NOTE — CONSULTS
"CLINICAL NUTRITION SERVICES  -  ASSESSMENT NOTE      Recommendations Ordered by Registered Dietitian (RD): Isosource 1.5 (substitution for Jevity 1.5) at 125 mL/hr x 16 hours per day (8 pm to 12 noon) hold TF 1 hour before and after Tegretol for a total of 1750 mL TF per day --> 2625 kcal (39 kcal per kg), 119 g protein (1.75 g per kg), 308 g CHO, 26 g fiber, 1330 mL flushes     Flushes 60 mL every 4 hours while patient receiving IVF   Malnutrition: Severe malnutrition  In Context of:  Chronic illness or disease        REASON FOR ASSESSMENT  Keyon Farias is a 56 year old male seen by Registered Dietitian for Provider Order - Registered Dietitian to Assess and Order TF per Medical Nutrition protocol (Would like to transition from continuous feeding to bolus feeding)      NUTRITION HISTORY  - Information obtained from mother Savannah who is his caretaker at home.  She states that he has a G-J tube but she is not sure if she provides the feeds via G or J tube (and the meds in the opposite tube).  Mother reports that she has been giving 4.5 cans per day of Jevity 1.5 continuously (1600 kcal/day and 68 g protein/day) with flushes of 60 mL whenever she connects or disconnects the tube and will flush with meds as well.  She usually runs TF rate to 65 mL/hr, maybe 85 mL/hr max.  She is interested in doing more of a cyclic feeding regimen in an effort to have patient off the pump for more hours per day.  She does hold TF 1 hour before and after the Tegretol dose daily (gets this 4x per day in hospital).  TF supplies are obtained from [employer].        CURRENT NUTRITION ORDERS  Diet Order:     NPO     Current Intake/Tolerance:  N/A      PHYSICAL FINDINGS  Observed  Muscle Wasting - Temporal, clavicle   Subcutaneous fat loss - Orbital   Obtained from Chart/Interdisciplinary Team  None noted    ANTHROPOMETRICS  Height: 6'0\"  Weight: 67.7 kg (149#)(6/6)  Body mass index is 20.24 kg/(m^2)  Weight Status:  Normal BMI  IBW: " "80.9 kg   % IBW: 84%  Weight History:   Wt Readings from Last 10 Encounters:   06/06/17 67.7 kg (149 lb 4 oz)   05/15/17 74.8 kg (165 lb)   02/09/17 73.3 kg (161 lb 8 oz)   01/23/17 74 kg (163 lb 2.3 oz)   12/09/16 79.5 kg (175 lb 4.3 oz)   10/31/16 72.7 kg (160 lb 3 oz)   09/09/16 93.8 kg (206 lb 12.7 oz)   01/06/16 79.1 kg (174 lb 6.1 oz)   11/18/15 76.6 kg (168 lb 12.8 oz)   04/27/15 72.6 kg (160 lb)   Per mother - patient has not weighed 149# \"for a long time\"  Per above, down 13# over the last 4 months (8%)      LABS  Labs reviewed    MEDICATIONS  Tegretol via FT 4x per day (0000, 0600, 1200, 1800)  IVF at 100 mL/hr    Dosing Weight 67.7 kg     ASSESSED NUTRITION NEEDS PER APPROVED PRACTICE GUIDELINES:  Estimated Energy Needs: 9112-0623 kcals (35-40 Kcal/Kg)  Justification: underweight  Estimated Protein Needs: 100-135 grams protein (1.5-2 g pro/Kg)  Justification: repletion  Estimated Fluid Needs: 4556-5632 mL (1 mL/Kcal)  Justification: maintenance    MALNUTRITION:  % Weight Loss:  > 7.5% in 3 months (severe malnutrition)  % Intake:  </= 75% for >/= 1 month (severe malnutrition)  Subcutaneous Fat Loss:  Orbital region mild depletion  Muscle Loss:  Temporal region mild depletion and Clavicle bone region mild depletion  Fluid Retention:  None noted    Malnutrition Diagnosis: Severe malnutrition  In Context of:  Chronic illness or disease    NUTRITION DIAGNOSIS:  Inadequate protein-energy intake related to NPO, TF to resume as evidenced by meeting 0% needs      NUTRITION INTERVENTIONS  Recommendations / Nutrition Prescription  Isosource 1.5 (substitution for Jevity 1.5) at 125 mL/hr x 16 hours per day (8 pm to 12 noon) hold TF 1 hour before and after Tegretol for a total of 1750 mL TF per day --> 2625 kcal (39 kcal per kg), 119 g protein (1.75 g per kg), 308 g CHO, 26 g fiber, 1330 mL flushes     Flushes 60 mL every 4 hours while patient receiving IVF       Implementation  Nutrition education: Not appropriate at " this time due to patient condition  EN Composition, EN Schedule, Feeding Tube Flush:  Start TF at 85 mL/hr x 16 hours tonight.  If tolerated, will advance by 20 mL every night to goal as above.  Flushes as above.  Collaboration and Referral of Nutrition care:  Discussed with RN.     Nutrition Goals  Patient will meet % needs via nocturnal TF    MONITORING AND EVALUATION:  Progress towards goals will be monitored and evaluated per protocol and Practice Guidelines      Swati Parrish RD, LD, CNSC   Clinical Dietitian - [facility]

## 2017-06-06 NOTE — PLAN OF CARE
Problem: Goal Outcome Summary  Goal: Goal Outcome Summary  Outcome: Improving  Patient alert, answers yes/no questions - appears oriented. VSS on room air. Bedrest, up with lift - pt's mother stated that she would like staff to transfer pt without using lift as this is how she would be transferring the patient at home. Patient denied pain when asked. Turned/repositioned every 2 hours. Incontinent of bowel and bladder, coccyx blanchable redness, barrier cream applied with perineal cares. Lung sounds diminished with coarse crackles at bases. Infrequent, productive cough. NPO, meds given per J-tube. Plan for nutrition consult and to resume tube feed. Discharge plan pending.

## 2017-06-06 NOTE — PROCEDURES
ONE-HOUR ELECTROENCEPHALOGRAM WITH VIDEO      ORDERING PHYSICIAN:  Caridad Valdes MD      EEG # [id number], one hour with video       NAME:  Keyon Farias        DATE OF STUDY:  06/05/2017      This is a 1-hour video EEG done in the standard [employer] 10-20 electrode system.  The background activity shows mainly alpha frequency activity in the right temporal region and a mixture of alpha and delta frequency activity in the left temporal region.  There is no clear posterior dominant rhythm in either hemisphere.  There are intermittent trains of right frontal epileptiform discharges.  There are sharp waves and spike and waves.  These rarely occur individually but when they do occur typically are in trains of 2-10 seconds.  At least one of these trains on this EEG appeared to have a morphology that changed during the episode which would be consistent with a focal seizure.  This particular episode lasted 10 seconds.  The discharges are typically high amplitude sharps with slow wave following at about 2 Hz.  In the example where there was morphology for focal seizure, the amplitude decreased and the frequency increased to about 4 Hz and then ended.  There was no slowing in that right hemisphere after the electrographic event.  The video was reviewed during these EEG changes and no clinical change was present.      IMPRESSION:  This is a markedly abnormal EEG due to   1.  Baseline mild slowing in the right hemisphere and moderate slowing in the left hemisphere consistent with the patient's known history of traumatic brain injury affecting the left hemisphere worse than the right.   2.  Intermittent right frontal epileptiform discharges, some of which frequently occur in trains.  At least 1 train organized into what appeared to be a brief focal seizure without clinical signs on the video recording.   This EEG is suggestive of #1, the chronic traumatic brain injury that is known for this patient and #2, frequent right  epileptiform discharges, some of which may be short focal seizure activity.  Clinical correlation is highly recommended in this complicated patient.         HOUSTON VALDES MD             D: 2017 14:57   T: 2017 08:49   MT: lavern      Name:     BERT BARAJAS   MRN:      [id number]        Account:        [id number]   :      1960           Procedure Date: 2017      Document: [id number]       cc: Houston Valdes MD

## 2017-06-06 NOTE — PLAN OF CARE
"Problem: Goal Outcome Summary  Goal: Goal Outcome Summary  SLP: Paged out to MD to discuss POC for speech therapy. Will wait for pt's mother to arrive. In the past mother has ok'd po for pleasure as pt cont to be high aspiration risk. Will hold bedside swallow eval. Pt receives TF via g-tube.     SLP: Pt seen for bedside swallow evaluation. Mother present and extensive education provided. Pt presents with significant oral pharyngeal dysphagia with s/s of aspiration. Pt is not appropriate for PO diet. Pt's mother states she is ok with small tastes of po on spoon (\"only honey thick liquids and some purees but definately no thin liquids\"). Mother verbalizes understanding of high aspiration risk. Mother also asking about bolus tube feedings vs continuous tube feedings- SLP instructed mom that both MD and nutrition must weigh in on this decision to assess whether it is possible pending pt's tolerance. Mother requested that honey thick liquids be trialed. Tspn dipped into liquid and presented to pt- who opened mouth ( tongue deviates to the right) max tongue pumping followed by delayed swallow with coughing and gurgley vocal quality following swallow reflex. Mom verbalized understanding of this episode. Recommend: Continue NPO. TF to cont pending MD's okay. Assess whether bolus vs cont. Tf is a possibility. Possible care team conf to educate mother on prognosis for this pt.       Discharge Planner SLP   Patient plan for discharge: NPO   Current status: NPO with tube feeding  Barriers to return to prior living situation: family's expectation of cont rehabilitation  Recommendations for discharge: consider bolus tf if pt can tolerate ( at request of mother), cont npo  Rationale for recommendations: high aspiration risk       Entered by: Marina Hyatt 06/06/2017 2:31 PM     "

## 2017-06-06 NOTE — PROGRESS NOTES
[facility] Hospitalist Progress Note    Date of Service (when I saw the patient): 06/06/2017    Keyon Farias is a 56 year old male who was admitted on 6/4/2017.     Assessment & Plan    1.  Aspiration pneumonia:  The patient has a long history of dysphagia and aspiration risk.  Does receive p.o. for pleasure at times.  Patient is afebrile, borderline leukocytosis and normal O2 sat, chest x-ray has questionable small infiltrate, procalcitonin level normal.  Due to patient's mental status changes initially treated with intravenous Zosyn.  Clinically do not feel patient has significant aspiration pneumonia so will switch to oral antibiotics for a total of five days.    2.  Constipation:   Patient had nausea and vomiting related to constipation at admission,   responded to MiraLAX three times a day, will switch to once daily.    3.  Seizure disorder with Tegretol toxicity:   Neurology consultation appreciated as they did a follow-up EEG and optimizing his seizure medication regimen.  Plan daily Tegretol levels now at appropriate level.  Patient's mental status is back to baseline.   Mother notes patient was discharged on 100 mg four times a day but approximate nine days prior to admission the prescription was changed to 200 mg four times a day.  A couple days after this increased dose patient's mental status started changing.  We'll restart dose at 150 mg every eight hours to help with overall compliance, further adjustments by neurology as needed.  Continue  Briviact.     4.  Chronic dysphagia on tube feeds:  The patient's mother is also interested in cyclic feeding instead of continuous feeding, and so we may need to get our nutritionist to help with respect to bolus feeding.    5.  History of panhypopituitarism:  The patient will be continued on his Cortef and DDAVP.       DVT Prophylaxis: Pneumatic Compression Devices  Code Status: Full Code    Disposition:  Expected discharge in 1 day as mental  status has returned to baseline and bowels moving,  will reinstitute tube feeding.    aGel Romero MD      Interval History   Patient is pleasant and communicates with yes and no and hand signals.  Mother confirms patient at his baseline mental status.  He has had several BMs since instituting three times a day MiraLAX.  Denies any abdominal pain.  Denies any cough pleurisy or chest pain. Mother notes patient was discharged on 100 mg four times a day but approximate nine days prior to admission the prescription was changed to 200 mg four times a day.  A couple days after this increased dose patient's mental status started changing.     -Data reviewed today: I reviewed all new labs and imaging results over the last 24 hours.    Physical Exam   Temp: 96.5  F (35.8  C) Temp src: Axillary BP: 102/51 Pulse: 78 Heart Rate: 50 Resp: 16 SpO2: 96 % O2 Device: None (Room air)    Vitals:    06/05/17 0613 06/06/17 0532   Weight: 70 kg (154 lb 5.2 oz) 67.7 kg (149 lb 4 oz)     Vital Signs with Ranges  Temp:  [95.7  F (35.4  C)-98.8  F (37.1  C)] 96.5  F (35.8  C)  Pulse:  [78] 78  Heart Rate:  [50-63] 50  Resp:  [16-18] 16  BP: (102-135)/(51-64) 102/51  SpO2:  [92 %-96 %] 96 %  I/O last 3 completed shifts:  In: 1518 [I.V.:1518]  Out: 250 [Urine:250]    LUNGS:  He has coarse breath sounds in both lung fields, with occasional brief wheeze.   CARDIOVASCULAR:  S1, S2, regular rate and rhythm.   ABDOMEN:  He has a G-tube in place.  Abdomen is soft.   EXTREMITIES:  Shows no edema and atrophy.   NEUROLOGIC:  He is nonverbal except yes,  no and hello, follows commands, he does move his 4 extremities.  He has spasticity.     Medications     - MEDICATION INSTRUCTIONS -       NaCl 100 mL/hr at 06/06/17 1132       heparin sodium PF  5,000 Units Subcutaneous Q8H     hydrocortisone  20 mg Per J Tube QAM     levothyroxine  150 mcg Per J Tube QAM AC     melatonin  6 mg Per J Tube QPM     pantoprazole  40 mg Per Feeding Tube BID      piperacillin-tazobactam  3.375 g Intravenous Q6H     docusate sodium  100 mg Oral BID     aspirin  81 mg Per J Tube Daily     bisacodyl  10 mg Rectal Daily     polyethylene glycol  17 g Oral TID     Brivaracetam  100 mg Per Feeding Tube QPM     cholecalciferol  2,000 Units Oral or Feeding Tube Daily     bacitracin   Topical Daily     testosterone cypionate  200 mg Intramuscular Q14 Days     hydrocortisone  10 mg Per J Tube QPM     Brivaracetam  100 mg Per Feeding Tube QAM       Data     Recent Labs  Lab 06/06/17  0710 06/05/17  0640 06/04/17 2051   WBC  --  12.4* 11.2*   HGB  --  13.3 14.2   PLT  --  161 264   * 127* 126*   POTASSIUM 4.2 4.2 4.1   BUN 15 16 13   CR 1.01 0.90 0.77   STEVE 7.5* 8.5 8.9   GLC 85 88 95       Imaging:   No results found for this or any previous visit (from the past 24 hour(s)).

## 2017-06-06 NOTE — PLAN OF CARE
Problem: Goal Outcome Summary  Goal: Goal Outcome Summary  Outcome: No Change  Pt understands/answers Y/N questions. Denied pain overnight although discomfort apparent when changing brief d/t skin breakdown near coccyx. Turn/repo q2hrs. Incont of bowel and bladder. Soft BMx1 overnight. Speech follow up with pt today. Per MD note tube feed will be resumed once pt is having bowel movements - multiple stools yesterday and overnight. Will need nutrition consult. D/c date pending. Continue to monitor.

## 2017-06-06 NOTE — PROGRESS NOTES
[facility]  Neurology Progress Note          Assessment and Plan:   1. TBI  2. Seizure d/o related to #1  3. Pneumonia    Pt well known to me I follow as outpatient.  Doing quite well today. Carbamazepine level high mother felt he was on 100mg Q6hrs my records suggets 200mg q6hrs. We will compromise at 150mg q 6 hrs, rx changed  Briviact increased due to active EEG           Attestation:  I have reviewed today's vital signs, medications, labs and imaging.          Interval History:   Pt seems alert, smiles and recognizes me.             Review of Systems:   Review of systems not obtained due to patient factors - language barrier          Medications:     Current Facility-Administered Medications Ordered in Epic   Medication Dose Route Frequency Last Rate Last Dose     amoxicillin-clavulanate (AUGMENTIN) 400-57 MG/5ML suspension 500 mg  500 mg Oral Q8H   500 mg at 06/06/17 1419     carBAMazepine (TEGretol) suspension 150 mg  150 mg Oral Q6H ALONDRA         albuterol neb solution 2.5 mg  2.5 mg Nebulization Q2H PRN         heparin sodium PF injection 5,000 Units  5,000 Units Subcutaneous Q8H   5,000 Units at 06/06/17 0925     hydrocortisone (CORTEF) tablet 20 mg  20 mg Per J Tube QAM   20 mg at 06/06/17 0925     levothyroxine (SYNTHROID/LEVOTHROID) tablet 150 mcg  150 mcg Per J Tube QAM AC   150 mcg at 06/06/17 0636     melatonin tablet 6 mg  6 mg Per J Tube QPM   6 mg at 06/05/17 2140     miconazole (MICATIN; MICRO GUARD) 2 % powder   Topical Q1H PRN         pantoprazole (PROTONIX) suspension 40 mg  40 mg Per Feeding Tube BID   40 mg at 06/06/17 0925     naloxone (NARCAN) injection 0.1-0.4 mg  0.1-0.4 mg Intravenous Q2 Min PRN         Patient is already receiving anticoagulation with heparin, enoxaparin (LOVENOX), warfarin (COUMADIN)  or other anticoagulant medication   Does not apply Continuous PRN         0.9% sodium chloride infusion   Intravenous Continuous 100 mL/hr at 06/06/17 7020        acetaminophen (TYLENOL) Suppository 650 mg  650 mg Rectal Q4H PRN         ondansetron (ZOFRAN-ODT) ODT tab 4 mg  4 mg Oral Q6H PRN        Or     ondansetron (ZOFRAN) injection 4 mg  4 mg Intravenous Q6H PRN         docusate sodium (COLACE) capsule 100 mg  100 mg Oral BID   100 mg at 06/05/17 1018     senna-docusate (SENOKOT-S;PERICOLACE) 8.6-50 MG per tablet 1-2 tablet  1-2 tablet Oral BID PRN         magnesium citrate solution 148 mL  148 mL Per Feeding Tube Once PRN         polyethylene glycol (MIRALAX/GLYCOLAX) Packet 17 g  17 g Oral Daily PRN         aspirin chewable tablet 81 mg  81 mg Per J Tube Daily   81 mg at 06/06/17 0925     bisacodyl (DULCOLAX) Suppository 10 mg  10 mg Rectal Daily   10 mg at 06/05/17 1019     polyethylene glycol (MIRALAX/GLYCOLAX) Packet 17 g  17 g Oral TID   17 g at 06/05/17 1326     Brivaracetam (BRIVIACT) solution 100 mg  100 mg Per Feeding Tube QPM   100 mg at 06/05/17 2140     cholecalciferol (vitamin D) tablet 2,000 Units  2,000 Units Oral or Feeding Tube Daily   2,000 Units at 06/06/17 0925     bacitracin ointment   Topical Daily         testosterone cypionate (DEPOTESTOTERONE) injection 200 mg  200 mg Intramuscular Q14 Days   200 mg at 06/05/17 2141     hydrocortisone (CORTEF) tablet 10 mg  10 mg Per J Tube QPM   10 mg at 06/05/17 2140     Brivaracetam (BRIVIACT) solution 100 mg  100 mg Per Feeding Tube QAM   100 mg at 06/06/17 0925     No current [facility]-ordered outpatient prescriptions on file.      PE  /57 (BP Location: Right arm)  Pulse 78  Temp 96.8  F (36  C) (Axillary)  Resp 16  Wt 67.7 kg (149 lb 4 oz)  SpO2 95%  BMI 20.24 kg/m2  Gen; awake, in BED NAD  Skin: red erythematous rash on face, dry skin  Neuro: Smiles, makes verbal noises, sometimes responds to yes/no. Right UMN facial droop. Does not move right arm, minimal movement right leg. Moves left arm and leg to command, has good strength in the left arm  CV: RRR  Chest: CTA B  Ext: No edema             Data:      Results for orders placed or performed during the hospital encounter of 06/04/17 (from the past 24 hour(s))   Basic metabolic panel   Result Value Ref Range    Sodium 128 (L) 133 - 144 mmol/L    Potassium 4.2 3.4 - 5.3 mmol/L    Chloride 98 94 - 109 mmol/L    Carbon Dioxide 24 20 - 32 mmol/L    Anion Gap 6 3 - 14 mmol/L    Glucose 85 70 - 99 mg/dL    Urea Nitrogen 15 7 - 30 mg/dL    Creatinine 1.01 0.66 - 1.25 mg/dL    GFR Estimate 77 >60 mL/min/1.7m2    GFR Estimate If Black >90   GFR Calc   >60 mL/min/1.7m2    Calcium 7.5 (L) 8.5 - 10.1 mg/dL   Carbamazepine total   Result Value Ref Range    Carbamazepine Level 9.0 4.0 - 12.0 mg/L   Procalcitonin   Result Value Ref Range    Procalcitonin  ng/ml     <0.05  <0.05 ng/ml  Normal  Recommendation: Very low risk of bacterial infection.   Discourage antibiotics unless strong clinical suspicion for serious infection.       -  -Shea Xiong MD  [facility]  6/6/2017 2:40 PM

## 2017-06-06 NOTE — PLAN OF CARE
Problem: Goal Outcome Summary  Goal: Goal Outcome Summary  Outcome: No Change  pt answers questions appropriately with yes and no answers and head nods. Denies pain. Bedrest, turn and repositioned every 2 hours. Right sided weakness. Incontinent of bowel and bladder. 3 BM's this shift. NPO. Plan is to continue IV antibiotics. Speech to f/o tomorrow. D/c date pending. Will continue to monitor.

## 2017-06-06 NOTE — PLAN OF CARE
"Problem: Goal Outcome Summary  Goal: Goal Outcome Summary  Outcome: No Change  TBI 26 years ago, total care, up with lift, nonverbal except for \"yes\" and \"no\", incont of bowel and bladder, tegretol levels back to normal this am with level of 9, tegretol restarted today, no seizure activity noted, tube feedings to start at 8pm, coccyx red and blanchable, repositioned Q2hrs, coarse crackles in bases of lungs, bowel meds held d/t loose stools      "

## 2017-06-07 ENCOUNTER — APPOINTMENT (OUTPATIENT)
Dept: SPEECH THERAPY | Facility: CLINIC | Age: 57
DRG: 177 | End: 2017-06-07
Payer: MEDICARE

## 2017-06-07 LAB
C DIFF TOX B STL QL: NORMAL
SPECIMEN SOURCE: NORMAL

## 2017-06-07 PROCEDURE — A9270 NON-COVERED ITEM OR SERVICE: HCPCS | Mod: GY

## 2017-06-07 PROCEDURE — 12000000 ZZH R&B MED SURG/OB

## 2017-06-07 PROCEDURE — A9270 NON-COVERED ITEM OR SERVICE: HCPCS | Mod: GY | Performed by: INTERNAL MEDICINE

## 2017-06-07 PROCEDURE — A9270 NON-COVERED ITEM OR SERVICE: HCPCS | Mod: GY | Performed by: PSYCHIATRY & NEUROLOGY

## 2017-06-07 PROCEDURE — A9270 NON-COVERED ITEM OR SERVICE: HCPCS | Mod: GY | Performed by: HOSPITALIST

## 2017-06-07 PROCEDURE — 87493 C DIFF AMPLIFIED PROBE: CPT | Performed by: HOSPITALIST

## 2017-06-07 PROCEDURE — 25000132 ZZH RX MED GY IP 250 OP 250 PS 637: Mod: GY

## 2017-06-07 PROCEDURE — 25000132 ZZH RX MED GY IP 250 OP 250 PS 637: Mod: GY | Performed by: HOSPITALIST

## 2017-06-07 PROCEDURE — 25000132 ZZH RX MED GY IP 250 OP 250 PS 637: Mod: GY | Performed by: INTERNAL MEDICINE

## 2017-06-07 PROCEDURE — 25000132 ZZH RX MED GY IP 250 OP 250 PS 637: Mod: GY | Performed by: PSYCHIATRY & NEUROLOGY

## 2017-06-07 PROCEDURE — 92526 ORAL FUNCTION THERAPY: CPT | Mod: GN | Performed by: SPEECH-LANGUAGE PATHOLOGIST

## 2017-06-07 PROCEDURE — 27210429 ZZH NUTRITION PRODUCT INTERMEDIATE LITER

## 2017-06-07 PROCEDURE — 25000128 H RX IP 250 OP 636: Performed by: INTERNAL MEDICINE

## 2017-06-07 PROCEDURE — 99232 SBSQ HOSP IP/OBS MODERATE 35: CPT | Performed by: HOSPITALIST

## 2017-06-07 PROCEDURE — 40000225 ZZH STATISTIC SLP WARD VISIT: Performed by: SPEECH-LANGUAGE PATHOLOGIST

## 2017-06-07 RX ORDER — CARBAMAZEPINE 100 MG/5ML
150 SUSPENSION ORAL EVERY 6 HOURS
Qty: 900 ML | Refills: 0 | Status: SHIPPED | OUTPATIENT
Start: 2017-06-07 | End: 2017-06-08

## 2017-06-07 RX ORDER — AMOXICILLIN AND CLAVULANATE POTASSIUM 400; 57 MG/5ML; MG/5ML
500 POWDER, FOR SUSPENSION ORAL EVERY 8 HOURS
Qty: 132.3 ML | Refills: 0 | Status: SHIPPED | OUTPATIENT
Start: 2017-06-07 | End: 2017-06-14

## 2017-06-07 RX ORDER — CARBAMAZEPINE 100 MG/5ML
150 SUSPENSION ORAL EVERY 6 HOURS SCHEDULED
Status: DISCONTINUED | OUTPATIENT
Start: 2017-06-07 | End: 2017-06-08 | Stop reason: HOSPADM

## 2017-06-07 RX ADMIN — Medication 40 MG: at 10:27

## 2017-06-07 RX ADMIN — HYDROCORTISONE 10 MG: 10 TABLET ORAL at 20:32

## 2017-06-07 RX ADMIN — CARBAMAZEPINE 150 MG: 100 SUSPENSION ORAL at 14:04

## 2017-06-07 RX ADMIN — Medication 40 MG: at 20:33

## 2017-06-07 RX ADMIN — HEPARIN SODIUM 5000 UNITS: 5000 INJECTION, SOLUTION INTRAVENOUS; SUBCUTANEOUS at 00:20

## 2017-06-07 RX ADMIN — VITAMIN D, TAB 1000IU (100/BT) 2000 UNITS: 25 TAB at 09:12

## 2017-06-07 RX ADMIN — CARBAMAZEPINE 150 MG: 100 SUSPENSION ORAL at 06:28

## 2017-06-07 RX ADMIN — CARBAMAZEPINE 150 MG: 100 SUSPENSION ORAL at 18:54

## 2017-06-07 RX ADMIN — AMOXICILLIN AND CLAVULANATE POTASSIUM 500 MG: 400; 57 POWDER, FOR SUSPENSION ORAL at 04:51

## 2017-06-07 RX ADMIN — LEVOTHYROXINE SODIUM 150 MCG: 150 TABLET ORAL at 06:42

## 2017-06-07 RX ADMIN — HEPARIN SODIUM 5000 UNITS: 5000 INJECTION, SOLUTION INTRAVENOUS; SUBCUTANEOUS at 09:11

## 2017-06-07 RX ADMIN — AMOXICILLIN AND CLAVULANATE POTASSIUM 500 MG: 400; 57 POWDER, FOR SUSPENSION ORAL at 20:32

## 2017-06-07 RX ADMIN — HEPARIN SODIUM 5000 UNITS: 5000 INJECTION, SOLUTION INTRAVENOUS; SUBCUTANEOUS at 18:54

## 2017-06-07 RX ADMIN — MELATONIN 6 MG: 3 TAB ORAL at 20:32

## 2017-06-07 RX ADMIN — HYDROCORTISONE 20 MG: 20 TABLET ORAL at 09:12

## 2017-06-07 RX ADMIN — BACITRACIN: 500 OINTMENT TOPICAL at 09:13

## 2017-06-07 RX ADMIN — CARBAMAZEPINE 150 MG: 100 SUSPENSION ORAL at 00:20

## 2017-06-07 RX ADMIN — AMOXICILLIN AND CLAVULANATE POTASSIUM 500 MG: 400; 57 POWDER, FOR SUSPENSION ORAL at 12:37

## 2017-06-07 RX ADMIN — ASPIRIN 81 MG 81 MG: 81 TABLET ORAL at 09:12

## 2017-06-07 NOTE — PROGRESS NOTES
SWS  D:  SW received consult request to assist pt with discharge planning.  Pt admitted to hospital with aspiration pneumonia.  CC spoke with pt's mother,who he resides with, and plan is for pt to return home with resumption of [facility] for home RN and HHA services and G tube/TF supplies provided by [facility].  P:  No current SWS needs identified, however, SWS remains available as needed to assist pt with coordination of appropriate discharge plan and/or communication with pt's involved home agencies;  [facility] and [facility].

## 2017-06-07 NOTE — PROVIDER NOTIFICATION
MD Notification    Notified Person:  MD    Notified Persons Name: Dr. Epperson    Notification Date/Time: 6/7/2017 11:44 AM     Notification Interaction:  Talked with Physician    Purpose of Notification: FYI pharmacy unable to fill meds until tomorrow & dietician increased goal rate for TF tonight. D/c tomorrow instead.

## 2017-06-07 NOTE — PLAN OF CARE
Problem: Goal Outcome Summary  Goal: Goal Outcome Summary  Outcome: No Change  Pt understands/answers Y/N questions. Pt denies pain, no signs that indicate pt is in pain. Turn/repo q2hrs. Incont of bowel and bladder. Speech to follow up with pt today. Per MD note tube feed restarted at 2130. Stop TF 1 hour before Tegretol and 1 hour after, TF stopped at 2300, Tegretol at 0000. Will need nutrition consult. D/c date pending. Continue to monitor.

## 2017-06-07 NOTE — PROGRESS NOTES
"CLINICAL NUTRITION SERVICES - REASSESSMENT NOTE      Recommendations Ordered by Registered Dietitian (RD): Increase TF to goal tonight -->   Isosource 1.5 at 110 mL/hr x 14 hours (hold 1 hour before and 1 hour after Tegretol administration) to provide 1320 mL TF product (5.5 cans for home), 1980 kcal (29 kcal per kg), 90 g protein (1.3 g per kg), 232 g CHO, 20 g fiber, 1003 mL H2O   Increase flushes to 120 mL every 4 hours now that IVF has been d/c'd        EVALUATION OF PROGRESS TOWARD GOALS   Diet:  NPO  Nutrition Support:  Patient started on cyclic TF yesterday and continues as follows ~    Nutrition Support Enteral:  Type of Feeding Tube: G-J tube   Enteral Frequency:  Cyclic  Enteral Regimen: Isosource 1.5 at 85 mL/hr x 16 hours (held 1 hour before and after Tegretol dose) = 1190 mL total   Total Enteral Provisions: 1785 kcal (26 kcal per kg) and 81 g protein (1.2 g per kg)  Free Water Flush: 60 mL every 4 hours while on IVF     Intake/Tolerance:    Patient tolerating above TF thus far.   Na 128 (L), Gluc 85  I/O incomplete  BM x 3 6/6      NEW FINDINGS:   Reviewed old RD notes from last fall and winter when patient was hospitalized -- noted those records indicate patient is 5'5\" (not 6'0\" as currently recorded).    Attempted to contact mother x 2 but she did not answer phone.  Patient is non-verbal but he does nod head \"yes\" and \"no\".  Asked patient if he is 6'0\" tall and he nodded \"no\" and then asked if he is 5'5\" tall and he nodded \"yes\".  Did get a tape measure and attempt to measure height with RN but patient had many pillows in bed and was somewhat contorted (legs and arms) making it difficult to measure.  Does appear that he is less than 6'0\" tall the tape measure and also patient confirmation that he is 5'5\".  Therefore, estimated needs adjusted based on Height of 5'5\" -->  Height:  5'5\"  Weight:  67.7 kg  IBW:  61.8 kg (110% IBW)  BMI:  25.93 kg/m^2    Patient has had significant weight loss (see " "yesterday's note for details)  Per mother, patient is not mobile (WC bound)    IVF was d/c'd yesterday    Dosing Weight 67.7 kg      ASSESSED NUTRITION NEEDS PER APPROVED PRACTICE GUIDELINES:  Estimated Energy Needs: 2790-7435 kcals (25-30 Kcal/Kg)  Justification: maintenance  Estimated Protein Needs:  grams protein (1.2-1.5 gram pro/Kg)  Justification: acute illness         Previous Goals (6/6):   Patient will meet % needs via nocturnal TF  Evaluation: Met    Previous Nutrition Diagnosis (6/6):   Inadequate protein-energy intake related to NPO, TF to resume as evidenced by meeting 0% needs  Evaluation: Resolved       CURRENT NUTRITION DIAGNOSIS  Inadequate enteral nutrition infusion related to TF not yet at goal as evidenced by TF at 75% goal rate for discharge    INTERVENTIONS  Recommendations / Nutrition Prescription  Increase TF to goal tonight -->   Isosource 1.5 at 110 mL/hr x 14 hours (hold 1 hour before and 1 hour after Tegretol administration) to provide 1320 mL TF product (5.5 cans for home), 1980 kcal (29 kcal per kg), 90 g protein (1.3 g per kg), 232 g CHO, 20 g fiber, 1003 mL H2O   Increase flushes to 120 mL every 4 hours now that IVF has been d/c'd     Implementation  EN Composition, EN Schedule, Feeding Tube Flush - Increased TF to goal as above    Collaboration and Referral of Nutrition care - Discussed above with RN     Goals  Isosource 1.5 at 110 mL/hr x 14 hours to meet % needs    MONITORING AND EVALUATION:  Progress towards goals will be monitored and evaluated per protocol and Practice Guidelines    Swati Parrish, RD, LD, [facility]   Clinical Dietitian - [facility]         Addendum:  Mother concerned about height of 5'5\" that is currently in EPIC.  We obtained a better tape measure and tried to measure arm span (although still difficult due to his contorted arms).  Measured as best as possible and came up with 5'10\" tall.  Based on this, patient is now 90% IBW but " would continue with above TF goal for home with 25-30 kcal per kg in patient that is WC bound.  Mother Savannah is in agreement with plan (TF for home will be increased from 4.5 cans per day to 5.5 cans per day).  Updated the care coordinator.    Swati Parrish, RD, LD, [facility]   Clinical Dietitian - [facility]

## 2017-06-07 NOTE — PROGRESS NOTES
Called patients mother Savannah at home to discuss d/c planning.  Per Savannah the patient is open to HC RN/HHA services through [facility].  2 RN's Monday 07:00-16:00 and Tuesday & Thursday 07:00-23:00.  TF and supplies through [facility].    Per Savannah she normally brings the patient to appointments with the assistance of HHA. She informed me that prior to Keyon's multiple hospitalizations, he was usually able to assist in repositioning and pivoting but has been so weak that she requires assist of HHA.  Discussed follow up's per Neurology 6-8 weeks with Inga and PCP follow up.  Savannah is okay with CC to schedule these f/up's Will continue to follow for d/c planning.   -Message into Dr. Xiong's CC to schedule Neurology f/up will enter in AVS prior to d/c  -Call into [facility] for f/up will enter in AVS prior to d/c

## 2017-06-07 NOTE — PLAN OF CARE
Problem: Goal Outcome Summary  Goal: Goal Outcome Summary  Discharge Planner SLP   Patient plan for discharge: Home with home health services  Current status: Honey thick liquids dipped in spoon trialed x4 with lingual pumping and delayed swallow. No overt s/sx of aspiration noted with vocal quality remaining clear. Pt requested trial to end. Recommended: continue NPO with comfort trials of honey thick liquids with SLP only. Standard oral cares.   Barriers to return to prior living situation: NA  Recommendations for discharge: Return home with home health services. Continued ST pending pt progress and goals of care.  Rationale for recommendations: Prognosis for improvement poor, however, pt may benefit from ST to determine options for comfort feeding and education for mother.       Entered by: Ryanne Hutton 06/07/2017 4:10 PM

## 2017-06-07 NOTE — PLAN OF CARE
Problem: Goal Outcome Summary  Goal: Goal Outcome Summary  Outcome: No Change  Pt. Alert, seems to understand yes/no questions. Denies pain, no signs that the pt is in pain. T&R q2h, barrier cream applied to blanchable area on coccyx x1. NPO, meds through G tube. Tube feed is running at 85 mL/hr. 120 mL flushes q4h. Stopped TF 1 hour before Tegretol and 1 hour after. L PIV is SL. Will continue to monitor.

## 2017-06-07 NOTE — PLAN OF CARE
Problem: Goal Outcome Summary  Goal: Goal Outcome Summary  Outcome: No Change  Pt alert, understands/answers Y/N questions. Pt denies pain, no signs that indicate pt is in pain. Turn/repo q2hrs, barrier cream applied to blanchable area on coccyx. Incont of bowel and bladder, voided x2, bowel movement x1. NPO, meds through J tube. Tube feed running at 85. Stopped TF 1 hour before Tegretol and 1 hour after. Speech to follow up with pt today. Plan: Expected discharge in 1 day as mental status has returned to baseline and bowels moving per MD note.

## 2017-06-07 NOTE — PROGRESS NOTES
[facility]  Neurology Progress Note          Assessment and Plan:   1. TBI  2. Seizure d/o related to #1  3. Pneumonia    Pt well known to me I follow as outpatient he seems close to baseline from neurological perspective  Carbamazepine level high on admission and his mother felt he was on 100mg Q6hrs my records suggets 200mg q6hrs. We will compromise at 150mg q 6 hrs, rx changed 6/6/17. Recheck level in one week.  Briviact increased due to active EEG from 50 mg BID to 100 mg BID I am fine with this decision. No level needed on this drug    From my perspective the patient is doing well. He can follow up with me as outpatient in 6-8 weeks. Fine to discharge when felt to be stable.         Attestation:  I have reviewed today's vital signs, medications, labs and imaging.          Interval History:   Seems to continue to improve. No seizure per nursing notes.              Review of Systems:   Review of systems not obtained due to patient factors - language barrier due to aphasia          Medications:     Current Facility-Administered Medications Ordered in Epic   Medication Dose Route Frequency Last Rate Last Dose     amoxicillin-clavulanate (AUGMENTIN) 400-57 MG/5ML suspension 500 mg  500 mg Oral Q8H   500 mg at 06/07/17 0451     carBAMazepine (TEGretol) suspension 150 mg  150 mg Oral Q6H ALONDRA   150 mg at 06/07/17 0628     dextrose 10 % 1,000 mL infusion   Intravenous Continuous PRN         albuterol neb solution 2.5 mg  2.5 mg Nebulization Q2H PRN         heparin sodium PF injection 5,000 Units  5,000 Units Subcutaneous Q8H   5,000 Units at 06/07/17 0020     hydrocortisone (CORTEF) tablet 20 mg  20 mg Per J Tube QAM   20 mg at 06/06/17 0925     levothyroxine (SYNTHROID/LEVOTHROID) tablet 150 mcg  150 mcg Per J Tube QAM AC   150 mcg at 06/07/17 0642     melatonin tablet 6 mg  6 mg Per J Tube QPM   6 mg at 06/06/17 2038     miconazole (MICATIN; MICRO GUARD) 2 % powder   Topical Q1H PRN          pantoprazole (PROTONIX) suspension 40 mg  40 mg Per Feeding Tube BID   40 mg at 06/06/17 2040     naloxone (NARCAN) injection 0.1-0.4 mg  0.1-0.4 mg Intravenous Q2 Min PRN         Patient is already receiving anticoagulation with heparin, enoxaparin (LOVENOX), warfarin (COUMADIN)  or other anticoagulant medication   Does not apply Continuous PRN         acetaminophen (TYLENOL) Suppository 650 mg  650 mg Rectal Q4H PRN         ondansetron (ZOFRAN-ODT) ODT tab 4 mg  4 mg Oral Q6H PRN        Or     ondansetron (ZOFRAN) injection 4 mg  4 mg Intravenous Q6H PRN         docusate sodium (COLACE) capsule 100 mg  100 mg Oral BID   100 mg at 06/05/17 1018     senna-docusate (SENOKOT-S;PERICOLACE) 8.6-50 MG per tablet 1-2 tablet  1-2 tablet Oral BID PRN         magnesium citrate solution 148 mL  148 mL Per Feeding Tube Once PRN         polyethylene glycol (MIRALAX/GLYCOLAX) Packet 17 g  17 g Oral Daily PRN         aspirin chewable tablet 81 mg  81 mg Per J Tube Daily   81 mg at 06/06/17 0925     bisacodyl (DULCOLAX) Suppository 10 mg  10 mg Rectal Daily   10 mg at 06/05/17 1019     polyethylene glycol (MIRALAX/GLYCOLAX) Packet 17 g  17 g Oral TID   17 g at 06/05/17 1326     Brivaracetam (BRIVIACT) solution 100 mg  100 mg Per Feeding Tube QPM   100 mg at 06/06/17 2133     cholecalciferol (vitamin D) tablet 2,000 Units  2,000 Units Oral or Feeding Tube Daily   2,000 Units at 06/06/17 0925     bacitracin ointment   Topical Daily         testosterone cypionate (DEPOTESTOTERONE) injection 200 mg  200 mg Intramuscular Q14 Days   200 mg at 06/05/17 2141     hydrocortisone (CORTEF) tablet 10 mg  10 mg Per J Tube QPM   10 mg at 06/06/17 2038     Brivaracetam (BRIVIACT) solution 100 mg  100 mg Per Feeding Tube QAM   100 mg at 06/06/17 0925     No current Epic-ordered outpatient prescriptions on file.      Physical Examination  /48 (BP Location: Left arm)  Pulse 91  Temp 97.1  F (36.2  C) (Oral)  Resp 18  Wt 70.7 kg (155 lb  12.8 oz)  SpO2 95%  BMI 21.13 kg/m2  Gen: in bed, asleep but alerts easily,  NAD  Skin: dry skin on face, less erythematous today  Neuro: Smiles, makes verbal noises, sometimes responds to yes/no. Significant aphasia and does better with verbal cues. Able to protrude tongue and it is midline. Right UMN facial droop. Does not move right arm, minimal movement right leg. Moves left arm and leg to command, has good strength in the left arm and follows commands if given visual cues.   CV: RRR  Chest: CTA B  Ext: No edema             Data:     Results for orders placed or performed during the hospital encounter of 06/04/17 (from the past 24 hour(s))   Nutrition Services Adult IP Consult    Narrative    Swati Parrish RD, LD     6/6/2017  3:31 PM  CLINICAL NUTRITION SERVICES  -  ASSESSMENT NOTE      Recommendations Ordered by Registered Dietitian (RD): Isosource   1.5 (substitution for Jevity 1.5) at 125 mL/hr x 16 hours per day   (8 pm to 12 noon) hold TF 1 hour before and after Tegretol for a   total of 1750 mL TF per day --> 2625 kcal (39 kcal per kg), 119 g   protein (1.75 g per kg), 308 g CHO, 26 g fiber, 1330 mL flushes     Flushes 60 mL every 4 hours while patient receiving IVF   Malnutrition: Severe malnutrition  In Context of:  Chronic illness or disease        REASON FOR ASSESSMENT  Keyon Farias is a 56 year old male seen by Registered Dietitian   for Provider Order - Registered Dietitian to Assess and Order TF   per Medical Nutrition protocol (Would like to transition from   continuous feeding to bolus feeding)      NUTRITION HISTORY  - Information obtained from mother Savannah who is his caretaker   at home.  She states that he has a G-J tube but she is not sure   if she provides the feeds via G or J tube (and the meds in the   opposite tube).  Mother reports that she has been giving 4.5 cans   per day of Jevity 1.5 continuously (1600 kcal/day and 68 g   protein/day) with flushes of 60 mL whenever she  "connects or   disconnects the tube and will flush with meds as well.  She   usually runs TF rate to 65 mL/hr, maybe 85 mL/hr max.  She is   interested in doing more of a cyclic feeding regimen in an effort   to have patient off the pump for more hours per day.  She does   hold TF 1 hour before and after the Tegretol dose daily (gets   this 4x per day in hospital).  TF supplies are obtained from   [facility].        CURRENT NUTRITION ORDERS  Diet Order:     NPO     Current Intake/Tolerance:  N/A      PHYSICAL FINDINGS  Observed  Muscle Wasting - Temporal, clavicle   Subcutaneous fat loss - Orbital   Obtained from Chart/Interdisciplinary Team  None noted    ANTHROPOMETRICS  Height: 6'0\"  Weight: 67.7 kg (149#)(6/6)  Body mass index is 20.24 kg/(m^2)  Weight Status:  Normal BMI  IBW: 80.9 kg   % IBW: 84%  Weight History:   Wt Readings from Last 10 Encounters:   06/06/17 67.7 kg (149 lb 4 oz)   05/15/17 74.8 kg (165 lb)   02/09/17 73.3 kg (161 lb 8 oz)   01/23/17 74 kg (163 lb 2.3 oz)   12/09/16 79.5 kg (175 lb 4.3 oz)   10/31/16 72.7 kg (160 lb 3 oz)   09/09/16 93.8 kg (206 lb 12.7 oz)   01/06/16 79.1 kg (174 lb 6.1 oz)   11/18/15 76.6 kg (168 lb 12.8 oz)   04/27/15 72.6 kg (160 lb)   Per mother - patient has not weighed 149# \"for a long time\"  Per above, down 13# over the last 4 months (8%)      LABS  Labs reviewed    MEDICATIONS  Tegretol via FT 4x per day (0000, 0600, 1200, 1800)  IVF at 100 mL/hr    Dosing Weight 67.7 kg     ASSESSED NUTRITION NEEDS PER APPROVED PRACTICE GUIDELINES:  Estimated Energy Needs: 2843-9415 kcals (35-40 Kcal/Kg)  Justification: underweight  Estimated Protein Needs: 100-135 grams protein (1.5-2 g pro/Kg)  Justification: repletion  Estimated Fluid Needs: 2000-6708 mL (1 mL/Kcal)  Justification: maintenance    MALNUTRITION:  % Weight Loss:  > 7.5% in 3 months (severe malnutrition)  % Intake:  </= 75% for >/= 1 month (severe malnutrition)  Subcutaneous Fat Loss:  Orbital region mild " depletion  Muscle Loss:  Temporal region mild depletion and Clavicle bone   region mild depletion  Fluid Retention:  None noted    Malnutrition Diagnosis: Severe malnutrition  In Context of:  Chronic illness or disease    NUTRITION DIAGNOSIS:  Inadequate protein-energy intake related to NPO, TF to resume as   evidenced by meeting 0% needs      NUTRITION INTERVENTIONS  Recommendations / Nutrition Prescription  Isosource 1.5 (substitution for Jevity 1.5) at 125 mL/hr x 16   hours per day (8 pm to 12 noon) hold TF 1 hour before and after   Tegretol for a total of 1750 mL TF per day --> 2625 kcal (39 kcal   per kg), 119 g protein (1.75 g per kg), 308 g CHO, 26 g fiber,   1330 mL flushes     Flushes 60 mL every 4 hours while patient receiving IVF       Implementation  Nutrition education: Not appropriate at this time due to patient   condition  EN Composition, EN Schedule, Feeding Tube Flush:  Start TF at 85   mL/hr x 16 hours tonight.  If tolerated, will advance by 20 mL   every night to goal as above.  Flushes as above.  Collaboration and Referral of Nutrition care:  Discussed with RN.       Nutrition Goals  Patient will meet % needs via nocturnal TF    MONITORING AND EVALUATION:  Progress towards goals will be monitored and evaluated per   protocol and Practice Guidelines      Swati Parrish RD, LD, CNSC   Clinical Dietitian - [facility]                    -      Shea Xiong MD  [facility]  6/7/2017 8:49 AM

## 2017-06-07 NOTE — PROGRESS NOTES
"[facility]  Hospitalist Progress Note        Hayden Nidhi Zhou, DO  06/07/2017        Interval History:      Discussed with nursing staff, updated on plan of care. Plan for pharmacy to fill medications, will not be able to achieve until tomorrow per report.          Assessment and Plan:        Keyon Farias was admitted on 6/4/2017.  The following problems were addressed during his hospitalization:    Aspiration pneumonia:  The patient has a long history of dysphagia and aspiration risk.  Does receive p.o. for pleasure at times. Due to patient's mental status changes initially treated with intravenous Zosyn.     - Switched to oral antibiotics for a total of five days.    Constipation:   Patient had nausea and vomiting related to constipation at admission, responded to MiraLAX three times a day.    - Continue miralax once daily.      Seizure disorder with Tegretol toxicity: Neurology consultation appreciated as they did a follow-up EEG and optimizing his seizure medication regimen. Patient's mental status back to baseline. Mother notes patient was discharged on 100 mg four times a day but approximate nine days prior to admission the prescription was changed to 200 mg four times a day.     - Tegretol dose adjusted.    - Continue  Briviact.    - Follow up with Neurology as directed.      Chronic dysphagia on tube feeds: Nutrition following.   - Increasing tube feeding to goal prior to discharge.      History of panhypopituitarism: Stable.    - Continued on Cortef and DDAVP.      DVT Prophylaxis: Pneumatic Compression Devices    Code: Full Code     Disposition:  Expected discharge in 1 day as pharmacy states prescriptions cannot be filled until tomorrow.                    Physical Exam:      Heart Rate: 62    Blood pressure 123/53, pulse 91, temperature 96.3  F (35.7  C), temperature source Oral, resp. rate 18, height 1.651 m (5' 5\"), weight 70.7 kg (155 lb 12.8 oz), SpO2 95 %.    Vitals:    06/05/17 " 0613 17 0532 17 0726   Weight: 70 kg (154 lb 5.2 oz) 67.7 kg (149 lb 4 oz) 70.7 kg (155 lb 12.8 oz)       Vital Sign Ranges  Temperature Temp  Av.9  F (36.1  C)  Min: 96.3  F (35.7  C)  Max: 97.2  F (36.2  C)   Blood pressure Systolic (24hrs), Av , Min:100 , Max:123        Diastolic (24hrs), Av, Min:48, Max:61      Pulse Pulse  Av  Min: 91  Max: 91   Respirations Resp  Av.6  Min: 16  Max: 18   Pulse oximetry SpO2  Av %  Min: 94 %  Max: 96 %     Vital Signs with Ranges  Temp:  [96.3  F (35.7  C)-97.2  F (36.2  C)] 96.3  F (35.7  C)  Pulse:  [91] 91  Heart Rate:  [51-64] 62  Resp:  [16-18] 18  BP: (100-123)/(48-61) 123/53  SpO2:  [94 %-96 %] 95 %    I/O Last 3 Shifts:   I/O last 3 completed shifts:  In: 360 [NG/GT:360]  Out: -     I/O past 24 hours:     Intake/Output Summary (Last 24 hours) at 17 1145  Last data filed at 17 1032   Gross per 24 hour   Intake              370 ml   Output                0 ml   Net              370 ml     GENERAL: Alert and oriented. NAD. Conversational, appropriate.   HEENT: Normocephalic. EOMI. No icterus or injection. Nares normal.   LUNGS: Coarse. No dyspnea at rest.   HEART: Regular rate. Extremities perfused.   ABDOMEN: Soft, nontender, and nondistended. Positive bowel sounds. G-tube in place.  EXTREMITIES: No LE edema noted.   NEUROLOGIC: Non-verbal, spasticity noted. Moves extremities x4.          Prior to Admission Medications:        Prescriptions Prior to Admission   Medication Sig Dispense Refill Last Dose     potassium & sodium phosphates (NEUTRA-PHOS) 280-160-250 MG Packet Take 1 packet by mouth 3 times daily 0900, 1500, 2100.   2017 at 1000     VITAMIN D, CHOLECALCIFEROL, PO Take 2,000 Units by mouth daily   2017 at am     bacitracin ointment Apply topically daily as needed for wound care To PEG site.   2017 at Unknown time     melatonin 1 MG/ML LIQD liquid 6 mLs (6 mg) by Per J Tube route every evening    6/3/2017 at 2200     aspirin 10 mg/mL 8.1 mLs (81 mg) by Per J Tube route daily   6/4/2017 at am     multivitamins with minerals (CERTAVITE/CEROVITE) LIQD liquid 15 mLs by Per J Tube route daily   6/4/2017 at am     fiber modular (NUTRISOURCE FIBER) packet 1 packet by Per Feeding Tube route 3 times daily   6/4/2017 at am     calcium carbonate 1250 (500 CA) MG/5ML SUSP suspension 5 mLs (1,250 mg) by Per J Tube route 3 times daily (with meals) 450 mL  6/4/2017 at 1500     levothyroxine (SYNTHROID) 25 mcg/mL 6 mLs (150 mcg) by Per J Tube route every morning (before breakfast) (Patient taking differently: 150 mcg by Per J Tube route every morning (before breakfast) Hold tube feeding 1 hour prior and 1 hour after medication administered.)   6/4/2017 at am     hydrocortisone (CORTEF) 2 mg/mL 10 mLs (20 mg) by Per J Tube route every morning   6/4/2017 at am     hydrocortisone (CORTEF) 2 mg/mL Take 5 mLs (10 mg) by mouth every evening   6/3/2017 at pm     bisacodyl (DULCOLAX) 10 MG Suppository Place 1 suppository (10 mg) rectally daily as needed for constipation 30 suppository  Past Month at Unknown time     miconazole (MICATIN; MICRO GUARD) 2 % powder Apply topically every hour as needed for other (topical candidiasis)   Past Month at Unknown time     polyethylene glycol (MIRALAX/GLYCOLAX) Packet Take 17 g by mouth 2 times daily as needed (constipation) 7 packet  6/4/2017 at 10am     ACETAMINOPHEN  mg by Per Feeding Tube route every 4 hours as needed for pain   Past Week at Unknown time     testosterone cypionate (DEPOTESTOTERONE CYPIONATE) 200 MG/ML injection Inject 200 mg into the muscle every 14 days Mondays 5/22/2017     [DISCONTINUED] Brivaracetam (BRIVIACT) 10 MG/ML solution Take 50 mg by mouth every morning    6/4/2017 at am     [DISCONTINUED] Brivaracetam (BRIVIACT) 10 MG/ML solution Take 100 mg by mouth every evening   6/3/2017 at pm     [DISCONTINUED] OMEPRAZOLE PO Take 20 mg by mouth 2 times daily  (before meals)   6/4/2017 at am     [DISCONTINUED] carBAMazepine (TEGRETOL) 100 MG chewable tablet 2 tablets (200 mg) by Per J Tube route 4 times daily 0600, 1100, 1500, 1900. Administer 1 hour before and 1 hour after tube feeding (Patient taking differently: 200 mg by Per J Tube route 4 times daily Hold tube feeding 1 hour prior and 1 hour after medication administration.  Give medication at 0700, 1300, 1900, 2300.)   6/4/2017 at 1800            Medications:        Current Facility-Administered Medications   Medication Last Rate     IV fluid REPLACEMENT ONLY       - MEDICATION INSTRUCTIONS -       Current Facility-Administered Medications   Medication Dose Route Frequency     amoxicillin-clavulanate  500 mg Oral Q8H     carBAMazepine  150 mg Oral Q6H ALONDRA     heparin sodium PF  5,000 Units Subcutaneous Q8H     hydrocortisone  20 mg Per J Tube QAM     levothyroxine  150 mcg Per J Tube QAM AC     melatonin  6 mg Per J Tube QPM     pantoprazole  40 mg Per Feeding Tube BID     docusate sodium  100 mg Oral BID     aspirin  81 mg Per J Tube Daily     polyethylene glycol  17 g Oral TID     Brivaracetam  100 mg Per Feeding Tube QPM     cholecalciferol  2,000 Units Oral or Feeding Tube Daily     bacitracin   Topical Daily     testosterone cypionate  200 mg Intramuscular Q14 Days     hydrocortisone  10 mg Per J Tube QPM     Brivaracetam  100 mg Per Feeding Tube QAM     Current Facility-Administered Medications   Medication Dose Route Frequency     IV fluid REPLACEMENT ONLY   Intravenous Continuous PRN     albuterol  2.5 mg Nebulization Q2H PRN     miconazole   Topical Q1H PRN     naloxone  0.1-0.4 mg Intravenous Q2 Min PRN     - MEDICATION INSTRUCTIONS -   Does not apply Continuous PRN     acetaminophen  650 mg Rectal Q4H PRN     ondansetron  4 mg Oral Q6H PRN    Or     ondansetron  4 mg Intravenous Q6H PRN     senna-docusate  1-2 tablet Oral BID PRN     magnesium citrate  148 mL Per Feeding Tube Once PRN     polyethylene  glycol  17 g Oral Daily PRN            Data:      Lab data reviewed.     Recent Labs  Lab 06/06/17  0710 06/05/17  0640 06/04/17 2051   HGB  --  13.3 14.2   MCV  --  73* 73*   PLT  --  161 264   * 127* 126*   POTASSIUM 4.2 4.2 4.1   CHLORIDE 98 92* 90*   CO2 24 26 26   BUN 15 16 13   CR 1.01 0.90 0.77   ANIONGAP 6 9 10   STEVE 7.5* 8.5 8.9   GLC 85 88 95           Imaging:      Imaging data reviewed.     JOHN FamO.  [place] Hospitalist  Pager [phone]

## 2017-06-07 NOTE — PROVIDER NOTIFICATION
MD Notification    Notified Person:  MD    Notified Persons Name: Dietician     Notification Date/Time: 6/7/2017 12:51 PM     Notification Interaction:  Talked with dietician    Purpose of Notification: Patient mother at bedside concerned about height & calorie intake; could you address?        Comment: Will address.

## 2017-06-08 VITALS
OXYGEN SATURATION: 93 % | HEART RATE: 74 BPM | RESPIRATION RATE: 18 BRPM | WEIGHT: 155.8 LBS | TEMPERATURE: 97.5 F | SYSTOLIC BLOOD PRESSURE: 120 MMHG | DIASTOLIC BLOOD PRESSURE: 79 MMHG | BODY MASS INDEX: 22.3 KG/M2 | HEIGHT: 70 IN

## 2017-06-08 LAB — GLUCOSE BLDC GLUCOMTR-MCNC: 151 MG/DL (ref 70–99)

## 2017-06-08 PROCEDURE — 99239 HOSP IP/OBS DSCHRG MGMT >30: CPT | Performed by: INTERNAL MEDICINE

## 2017-06-08 PROCEDURE — 25000132 ZZH RX MED GY IP 250 OP 250 PS 637: Mod: GY | Performed by: INTERNAL MEDICINE

## 2017-06-08 PROCEDURE — G8997 SWALLOW GOAL STATUS: HCPCS | Mod: GN,CM

## 2017-06-08 PROCEDURE — A9270 NON-COVERED ITEM OR SERVICE: HCPCS | Mod: GY | Performed by: HOSPITALIST

## 2017-06-08 PROCEDURE — G8996 SWALLOW CURRENT STATUS: HCPCS | Mod: GN,CM

## 2017-06-08 PROCEDURE — 25000128 H RX IP 250 OP 636: Performed by: INTERNAL MEDICINE

## 2017-06-08 PROCEDURE — A9270 NON-COVERED ITEM OR SERVICE: HCPCS | Mod: GY | Performed by: INTERNAL MEDICINE

## 2017-06-08 PROCEDURE — 00000146 ZZHCL STATISTIC GLUCOSE BY METER IP

## 2017-06-08 PROCEDURE — 27210429 ZZH NUTRITION PRODUCT INTERMEDIATE LITER

## 2017-06-08 PROCEDURE — 25000132 ZZH RX MED GY IP 250 OP 250 PS 637: Mod: GY | Performed by: HOSPITALIST

## 2017-06-08 RX ORDER — CARBAMAZEPINE 100 MG/5ML
150 SUSPENSION ORAL EVERY 6 HOURS
Qty: 900 ML | Refills: 0 | Status: ON HOLD | OUTPATIENT
Start: 2017-06-08 | End: 2017-10-07

## 2017-06-08 RX ADMIN — CARBAMAZEPINE 150 MG: 100 SUSPENSION ORAL at 06:33

## 2017-06-08 RX ADMIN — VITAMIN D, TAB 1000IU (100/BT) 2000 UNITS: 25 TAB at 09:06

## 2017-06-08 RX ADMIN — ASPIRIN 81 MG 81 MG: 81 TABLET ORAL at 09:06

## 2017-06-08 RX ADMIN — AMOXICILLIN AND CLAVULANATE POTASSIUM 500 MG: 400; 57 POWDER, FOR SUSPENSION ORAL at 12:53

## 2017-06-08 RX ADMIN — HEPARIN SODIUM 5000 UNITS: 5000 INJECTION, SOLUTION INTRAVENOUS; SUBCUTANEOUS at 01:09

## 2017-06-08 RX ADMIN — POLYETHYLENE GLYCOL 3350 17 G: 17 POWDER, FOR SOLUTION ORAL at 09:06

## 2017-06-08 RX ADMIN — HYDROCORTISONE 20 MG: 20 TABLET ORAL at 09:06

## 2017-06-08 RX ADMIN — POLYETHYLENE GLYCOL 3350 17 G: 17 POWDER, FOR SOLUTION ORAL at 12:53

## 2017-06-08 RX ADMIN — BACITRACIN: 500 OINTMENT TOPICAL at 09:19

## 2017-06-08 RX ADMIN — CARBAMAZEPINE 150 MG: 100 SUSPENSION ORAL at 12:53

## 2017-06-08 RX ADMIN — AMOXICILLIN AND CLAVULANATE POTASSIUM 500 MG: 400; 57 POWDER, FOR SUSPENSION ORAL at 03:59

## 2017-06-08 RX ADMIN — HEPARIN SODIUM 5000 UNITS: 5000 INJECTION, SOLUTION INTRAVENOUS; SUBCUTANEOUS at 09:17

## 2017-06-08 RX ADMIN — CARBAMAZEPINE 150 MG: 100 SUSPENSION ORAL at 01:09

## 2017-06-08 RX ADMIN — DOCUSATE SODIUM 100 MG: 50 LIQUID ORAL at 10:40

## 2017-06-08 RX ADMIN — LEVOTHYROXINE SODIUM 150 MCG: 150 TABLET ORAL at 06:33

## 2017-06-08 RX ADMIN — Medication 40 MG: at 09:22

## 2017-06-08 NOTE — PROGRESS NOTES
Sw:    I/P:  Per notes pt is open to [facility] (ph:  [phone]; fax:  [phone])  and receives TF supplies with [facility] (ph:  [phone]; fax:  [phone]).  Per rounds pt is ready to d/c back home today with mom with HC RN/HHA/SLP orders and new TF orders.  Stewart contacted [facility] and was told initially that pt had been discharged from their agency and that they cannot accept any pts that need HHA and SLP services due to staffing shortages.  Stewart contacted mom to inquire about a HC agency choice and mom stated that she was never told this and did not want to switch agencies due to the relationships her/pt had developed with the nursing staff.  Stewart contacted [facility] back to explain that pt's mom disputes pt being discharged and upon further review rep determined that pt was open to receiving RN and PCA services but stated that they cannot increase services to HHA and SLP.  Care team reviewed pt's record and determined that due to the amount of services pt was/is receiving with [facility] that it would be best to continue with this HC agency despite SLP shortages..  Stewart contacted pt's mom and she agreed that she would like to continue with [facility] despite SLP staffing shortages.  Stewart faxed orders and received verbal confirmation from Lakia at [facility] that they have been received.  [facility] is to reach out to pt's mom today regarding resuming RN/PCA services.  Stewart contacted [facility] and confirmed that pt does receive TF supplies from this agency and that pt recently received the full supply for June.  Stewart faxed the new order and received verbal confirmation that it was received.  Rep stated that this will be forwarded to the person that manages pt's account and this will be adjusted accordingly.  Per pt's mom pt would need a stretcher transport at discharge.  The medical reasons for the stretcher are due to pt's coccyx wound and pt's TBI history.  Pt has [place].  Stewart arranged stretcher  transport with Marina from  for today at 1430.  Per mom the home has a ramp, no stairs; updated [facility].      Update:  Sw updated pt's mom that medications will be filled at [facility]

## 2017-06-08 NOTE — PROVIDER NOTIFICATION
MD Notification    Notified Person:  MD    Notified Persons Name: JESSICA Crocker    Notification Date/Time: 6/8/17 8:39 a.m.    Notification Interaction:  Talked with Physician    Purpose of Notification: d/c orders entered yesterday asking for resumption of HC RN/HHA and addition of SLP with medicare f2f  And d/c order for today.    Orders Received: MD to complete orders Addendum: HC provider unable to provide SLP for d/c patient is currently NPO and SLP recc HC SLP for education will provide information to PCP for further recc.  Comments:

## 2017-06-08 NOTE — PLAN OF CARE
Problem: Goal Outcome Summary  Goal: Goal Outcome Summary  Outcome: No Change  Alert, able to answer yes/no questions. Bedrest. T&R q2h. NPO, TF increased at 2000 to goal rate of 110cc/hr. 120 q4h free water flushes. Oral cares done. G tube site CDI. Blanchable redness on coccyx, barrier cream applied.  Will continue to monitor.
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
## 2017-06-08 NOTE — PLAN OF CARE
Problem: Goal Outcome Summary  Goal: Goal Outcome Summary  Outcome: No Change  Alert, able to answer yes/no questions. VSS. Bedrest. T&R q2h. NPO, TF rate of 110cc/hr. G tube site CDI. Blanchable redness on coccyx, barrier cream applied. Will continue to monitor.

## 2017-06-08 NOTE — PROVIDER NOTIFICATION
MD Notification    Notified Person:  MD    Notified Persons Name: Zhou    Notification Date/Time: 6/8/17 11:31 a.m.    Notification Interaction:  Talked with Physician    Purpose of Notification: asking for rx to be signed for briviact     Orders Received: will deliver for signature to be billed by d/c pharmacy    Comments:

## 2017-06-08 NOTE — PLAN OF CARE
Problem: Goal Outcome Summary  Goal: Goal Outcome Summary  Outcome: Adequate for Discharge Date Met:  06/08/17  A&O, able to answer yes/no questions. VSS. Bedrest. T&R q2h. NPO, TF rate of 110cc/hr until 10:00AM, then flushed and clamped. G tube site CDI. Incontinent of bladder/bowels, changed frequently. Blanchable redness on coccyx, preventative mepilex patch and barrier cream applied. Discharge instructions for continuation of care reviewed with mother. Pt discharged with meds and supplies to home with transport.

## 2017-06-09 NOTE — PLAN OF CARE
Problem: Goal Outcome Summary  Goal: Goal Outcome Summary  Speech Language Therapy Discharge Summary     Reason for therapy discharge:    Discharged to home with home therapy.     Progress towards therapy goal(s). See goals on Care Plan in [facility] electronic health record for goal details.  Goals not met.  Barriers to achieving goals:   discharge from facility.     Therapy recommendation(s):    Continued therapy is recommended.  Rationale/Recommendations:  Pt currently NPO. Pt may benefit from ST to determine options for comfort feeding of HTL via teaspoon and education for mother..

## 2017-06-12 ENCOUNTER — OFFICE VISIT (OUTPATIENT)
Dept: GASTROENTEROLOGY | Facility: CLINIC | Age: 57
End: 2017-06-12

## 2017-06-12 VITALS
TEMPERATURE: 97.3 F | SYSTOLIC BLOOD PRESSURE: 105 MMHG | OXYGEN SATURATION: 92 % | HEIGHT: 70 IN | HEART RATE: 110 BPM | DIASTOLIC BLOOD PRESSURE: 65 MMHG

## 2017-06-12 DIAGNOSIS — K85.01 IDIOPATHIC ACUTE PANCREATITIS WITH UNINFECTED NECROSIS: Primary | ICD-10-CM

## 2017-06-12 ASSESSMENT — PAIN SCALES - GENERAL: PAINLEVEL: MODERATE PAIN (4)
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
## 2017-06-12 NOTE — LETTER
6/12/2017       RE: Keyon Farias  [address] [place] [address]     Dear Colleague,    Thank you for referring your patient, Keyon Farias, to the [facility] at [facility]. Please see a copy of my visit note below.    Therapeutic Endoscopy Clinic Visit    CC/Referring Physician:  Julee Roberson; Carlos Gomez  Question for Consultation:  Follow up for management of necrotizing pancreatitis    Assessment and Plan:  Mr Farias is a comorbid 57yo gentleman who we met for management of necrotizing pancreatitis earlier this year managed by dual cystogastrostomy. He has not had interval imaging since February so it is difficult to know how his abdominal findings have changed. That said we believe we addressed the two major pockets and his clinical course has been reassuring in regards to his abdomen. He has no abdominal complaints and has been without infection save for this known aspiration pneumonia. He does have diarrhea, but this is thought to be iatrogenic. I would be curious to know how much pancreatic parenchyma remains and this may aid in our consideration for pancreatic enzymes. That said, he is primarily getting his nutrition per his GJ tube as he continues to have swallowing issues. His renal function appears intact and we therefore will obtain an abdominal CT to evaluate for residual collections, the placement of the stents, and to view the pancreatic parenchyma. These findings will dictate any need for interval invasive procedures. He does not smoke or drink and is under the full care of his mother. He is not diabetic.    RTC as clinical course dictates.    Thank you for this consultation.  It was a pleasure to participate in the care of this patient; please contact us with any further questions.  A total of 40 minutes was spent with this patient, >50% of which was counseling regarding the above delineated issues.    Jus Montana MD  PhD  Director of Endoscopy   of Medicine  Interventional and Therapeutic Endoscopy    [facility]  Division of Gastroenterology and Hepatology  [facility] [address], [place] [address]    New Consultations  [phone]  Procedure Scheduling [phone]  Clinical Nurse Coordinator [phone]  Clinical Fax   [phone]  Administrative   [phone]  Administrative Fax  [phone]    -------------------------------------------------------------------------------------------------------------------  History of Presentation:   Mr Farias is a 57yo gentleman with a history of traumatic brain injury with resulting seizures, aphasia, right sided hemiplegia, as well as panhypopit who we met in January of this year for management of idiopathic necrotizing pancreatitis managed in large part by dual cystogastrostomy and placement of GJ tube. He last has three plastic cystogastrostomy stents in communication with two separate pockets. The collections were thought to be primarily fluid and subsequent necrosectomy was not indicated. We have not had interval imaging since early February.    He is here with his mother who is speaking for him. In the interval he was admitted once for what is thought to be aspiration pneumonia. He has no abdominal pain. He continues to be tube feed in large part due to swallowing issues not belly pain. He does not have diarrhea save for the past few weeks thought secondary to iatrogenic medicines for constipation. He is not on pancreatic enzymes, as these were stopped elsewhere. His weight is stable outside of his hospitalizations.    Review of systems:  A twelve point review was performed and was otherwise negative beyond what is mentioned in the history above.    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      Pneumonia      Seizures (H)     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     Septic shock (H)      Spastic hemiplegia affecting dominant side (H)     related to wil injury     Thyroid disease      Tracheostomy care (H)      Traumatic brain injury (H) 1989     Unspecified cerebral artery occlusion with cerebral infarction 1989     UTI (urinary tract infection)      Ventricular fibrillation (H)      Ventricular tachyarrhythmia (H)      Previous surgeries:  Past Surgical History:   Procedure Laterality Date     ENDOSCOPIC ULTRASOUND UPPER GASTROINTESTINAL TRACT (GI) N/A 1/30/2017    Procedure: ENDOSCOPIC ULTRASOUND, ESOPHAGOSCOPY / UPPER GASTROINTESTINAL TRACT (GI);  Surgeon: uJs Montana MD;  Location:  OR     ENDOSCOPIC ULTRASOUND, ESOPHAGOSCOPY, GASTROSCOPY, DUODENOSCOPY (EGD), NECROSECTOMY N/A 2/7/2017    Procedure: ENDOSCOPIC ULTRASOUND, ESOPHAGOSCOPY, GASTROSCOPY, DUODENOSCOPY (EGD), NECROSECTOMY;  Surgeon: Jack Marcus MD;  Location:  OR     ESOPHAGOSCOPY, GASTROSCOPY, DUODENOSCOPY (EGD), COMBINED  3/13/2014    Procedure: COMBINED ESOPHAGOSCOPY, GASTROSCOPY, DUODENOSCOPY (EGD), BIOPSY SINGLE OR MULTIPLE;  gastroscopy;  Surgeon: Digna Rhodes MD;  Location: [facility]     ESOPHAGOSCOPY, GASTROSCOPY, DUODENOSCOPY (EGD), COMBINED N/A 12/6/2016    Procedure: COMBINED ESOPHAGOSCOPY, GASTROSCOPY, DUODENOSCOPY (EGD);  Surgeon: Digna Rhodes MD;  Location: [facility]     ESOPHAGOSCOPY, GASTROSCOPY, DUODENOSCOPY (EGD), COMBINED N/A 2/7/2017    Procedure: COMBINED ENDOSCOPIC ULTRASOUND, ESOPHAGOSCOPY, GASTROSCOPY, DUODENOSCOPY (EGD), FINE NEEDLE ASPIRATE/BIOPSY;  Surgeon: Too Thakur MD;  Location:  OR     HEAD &  NECK SURGERY      reconstructive facial surgery following accident in 1989     LAPAROSCOPIC APPENDECTOMY  7/30/2013    Procedure: LAPAROSCOPIC APPENDECTOMY;  LAPAROSCOPIC APPENDECTOMY;  Surgeon: Manish Pierce MD;  Location:  OR     LAPAROSCOPIC ASSISTED INSERTION TUBE GASTROTOMY N/A 9/7/2016    Procedure: LAPAROSCOPIC ASSISTED INSERTION TUBE GASTROSTOMY;  Surgeon: Manish Pierce MD;  Location:  OR     ORTHOPEDIC SURGERY      right hand repair     TRACHEOSTOMY N/A 9/3/2016    Procedure: TRACHEOSTOMY;  Surgeon: João Ortiz MD;  Location:  OR     TRACHEOSTOMY N/A 12/2/2016    Procedure: TRACHEOSTOMY;  Surgeon: João Ortiz MD;  Location:  OR     VASCULAR SURGERY       Current mediations:  Current Outpatient Prescriptions   Medication     carBAMazepine (TEGRETOL) 100 MG/5ML suspension     Brivaracetam (BRIVIACT) 10 MG/ML solution     Brivaracetam (BRIVIACT) 10 MG/ML solution     amoxicillin-clavulanate (AUGMENTIN) 400-57 MG/5ML suspension     pantoprazole (PROTONIX) SUSP suspension     potassium & sodium phosphates (NEUTRA-PHOS) 280-160-250 MG Packet     VITAMIN D, CHOLECALCIFEROL, PO     bacitracin ointment     melatonin 1 MG/ML LIQD liquid     aspirin 10 mg/mL     multivitamins with minerals (CERTAVITE/CEROVITE) LIQD liquid     fiber modular (NUTRISOURCE FIBER) packet     calcium carbonate 1250 (500 CA) MG/5ML SUSP suspension     levothyroxine (SYNTHROID) 25 mcg/mL     hydrocortisone (CORTEF) 2 mg/mL     hydrocortisone (CORTEF) 2 mg/mL     bisacodyl (DULCOLAX) 10 MG Suppository     miconazole (MICATIN; MICRO GUARD) 2 % powder     polyethylene glycol (MIRALAX/GLYCOLAX) Packet     ACETAMINOPHEN PO     testosterone cypionate (DEPOTESTOTERONE CYPIONATE) 200 MG/ML injection     No current facility-administered medications for this visit.        Medications reviewed with patient today, see Medication List/Assessment for details.  No other NSAID/anticoagulation reported by  patient.  No other OTC/herbal/supplements reported by patient.    Social history:  Social History     Social History     Marital status: Single     Spouse name: N/A     Number of children: N/A     Years of education: N/A     Occupational History     Not on file.     Social History Main Topics     Smoking status: Former Smoker     Quit date: 4/23/1989     Smokeless tobacco: Not on file     Alcohol use No     Drug use: No     Sexual activity: No     Other Topics Concern     Not on file     Social History Narrative       Family history:  No family history on file.  Family history reviewed and updated in EPIC  No colon/panc/other GI CA, no other HNPCC-related [place].  No IBD/celiac, no AI/liver disease.    PHYSICAL EXAMINATION:  Vitals reviewed, AFVSS   Wt   Wt Readings from Last 2 Encounters:   06/07/17 70.7 kg (155 lb 12.8 oz)   05/15/17 74.8 kg (165 lb)      Gen: nontoxic, aaox3, cooperative, pleasant, not dyspneic/diaphoretic  HEENT: neck supple, normal op w/o ulcer/exudate, anicteric  Resp/CV unremarkable without significant finding  Abd: GJ tube in place left mid quad, clean, benign, soft, nondistended, intact bs, no hepatosplenomegaly, nontender, no peritoneal signs  Ext: no c/c/e  Skin: warm, perfused, no jaundice  Neuro: grossly intact    Pertinent outside studies:  None      Again, thank you for allowing me to participate in the care of your patient.      Sincerely,    Jus Montana MD

## 2017-06-12 NOTE — MR AVS SNAPSHOT
After Visit Summary   6/12/2017    Keyon Farias    MRN: [id number]           Patient Information     Date Of Birth          1960        Visit Information        Provider Department      6/12/2017 7:15 AM Jus Montana MD [facility] Pancreas and Biliary        Today's Diagnoses     Idiopathic acute pancreatitis with uninfected necrosis    -  1       Follow-ups after your visit        Your next 10 appointments already scheduled     Jun 12, 2017  9:40 AM CDT   (Arrive by 9:25 AM)   CT ABDOMEN PELVIS W CONTRAST with UCCT2   [facility] CT ([facility])    [address] [address] [phone]           Please bring any scans or X-rays taken at other hospitals, if similar tests were done. Also bring a list of your medicines, including vitamins, minerals and over-the-counter drugs. It is safest to leave personal items at home.  Be sure to tell your doctor:   If you have any allergies.   If there s any chance you are pregnant.   If you are breastfeeding.   If you have any special needs.  You may have contrast for this exam. To prepare:   Do not eat or drink for 2 hours before your exam. If you need to take medicine, you may take it with small sips of water. (We may ask you to take liquid medicine as well.)   The day before your exam, drink extra fluids at least six 8-ounce glasses (unless your doctor tells you to restrict your fluids).  Patients over 70 or patients with diabetes or kidney problems:   If you haven t had a blood test (creatinine test) within the last 30 days, go to your clinic or Diagnostic Imaging Department for this test.  If you have diabetes:   If your kidney function is normal, continue taking your metformin (Avandamet, Glucophage, Glucovance, Metaglip) on the day of your exam.   If your kidney function is abnormal, wait 48 hours before restarting this medicine.  You will have oral contrast for this exam:    "You will drink the contrast at home. Get this from your clinic or Diagnostic Imaging Department. Please follow the directions given.  Please wear loose clothing, such as a sweat suit or jogging clothes. Avoid snaps, zippers and other metal. We may ask you to undress and put on a hospital gown.  If you have any questions, please call the Imaging Department where you will have your exam.              Who to contact     Please call your clinic at [phone] to:    Ask questions about your health    Make or cancel appointments    Discuss your medicines    Learn about your test results    Speak to your doctor   If you have compliments or concerns about an experience at your clinic, or if you wish to file a complaint, please contact [facility] Patient Relations at [phone] or email us at [email or web address]         Additional Information About Your Visit        [employer] Information     [employer] is an electronic gateway that provides easy, online access to your medical records. With [employer], you can request a clinic appointment, read your test results, renew a prescription or communicate with your care team.     To sign up for [employer] visit the website at [email or web address]   You will be asked to enter the access code listed below, as well as some personal information. Please follow the directions to create your username and password.     Your access code is: [email or web address]  Expires: 2017 10:50 AM     Your access code will  in 90 days. If you need help or a new code, please contact your [facility] or call [phone] for assistance.        Care EveryWhere ID     This is your Care EveryWhere ID. This could be used by other organizations to access your [facility] medical records  [id number]        Your Vitals Were     Pulse Temperature Height Pulse Oximetry          110 97.3  F (36.3  C) (Oral) 1.778 m (5' 10\") 92%         Blood " Pressure from Last 3 Encounters:   06/12/17 105/65   06/08/17 120/79   05/15/17 119/71    Weight from Last 3 Encounters:   06/07/17 70.7 kg (155 lb 12.8 oz)   05/15/17 74.8 kg (165 lb)   02/09/17 73.3 kg (161 lb 8 oz)                 Today's Medication Changes          These changes are accurate as of: 6/12/17  8:44 AM.  If you have any questions, ask your nurse or doctor.               These medicines have changed or have updated prescriptions.        Dose/Directions    levothyroxine 25 mcg/mL Susp   Commonly known as:  SYNTHROID   This may have changed:  additional instructions   Used for:  Panhypopituitarism (H)        Dose:  150 mcg   6 mLs (150 mcg) by Per J Tube route every morning (before breakfast)   Refills:  0                Primary Care Provider    None Specified       No primary provider on file.        Thank you!     Thank you for choosing [facility] PANCREAS AND BILIARY  for your care. Our goal is always to provide you with excellent care. Hearing back from our patients is one way we can continue to improve our services. Please take a few minutes to complete the written survey that you may receive in the mail after your visit with us. Thank you!             Your Updated Medication List - Protect others around you: Learn how to safely use, store and throw away your medicines at www.disposemymeds.org.          This list is accurate as of: 6/12/17  8:44 AM.  Always use your most recent med list.                   Brand Name Dispense Instructions for use    ACETAMINOPHEN PO      650 mg by Per Feeding Tube route every 4 hours as needed for pain       amoxicillin-clavulanate 400-57 MG/5ML suspension    AUGMENTIN    132.3 mL    Take 6.3 mLs (500 mg) by mouth every 8 hours for 7 days       aspirin 10 mg/mL Susp      8.1 mLs (81 mg) by Per J Tube route daily       bacitracin ointment      Apply topically daily as needed for wound care To PEG site.       bisacodyl 10 MG Suppository    DULCOLAX    30 suppository     Place 1 suppository (10 mg) rectally daily as needed for constipation       * Brivaracetam 10 MG/ML solution    BRIVIACT    300 mL    10 mLs (100 mg) by Per Feeding Tube route every evening       * Brivaracetam 10 MG/ML solution    BRIVIACT    300 mL    10 mLs (100 mg) by Per Feeding Tube route every morning       calcium carbonate 1250 (500 CA) MG/5ML Susp suspension     450 mL    5 mLs (1,250 mg) by Per J Tube route 3 times daily (with meals)       carBAMazepine 100 MG/5ML suspension    TEGretol    900 mL    Take 7.5 mLs (150 mg) by mouth every 6 hours       fiber modular packet      1 packet by Per Feeding Tube route 3 times daily       * hydrocortisone 2 mg/mL Susp    CORTEF     10 mLs (20 mg) by Per J Tube route every morning       * hydrocortisone 2 mg/mL Susp    CORTEF     Take 5 mLs (10 mg) by mouth every evening       levothyroxine 25 mcg/mL Susp    SYNTHROID     6 mLs (150 mcg) by Per J Tube route every morning (before breakfast)       melatonin 1 MG/ML Liqd liquid      6 mLs (6 mg) by Per J Tube route every evening       miconazole 2 % powder    MICATIN; MICRO GUARD     Apply topically every hour as needed for other (topical candidiasis)       multivitamins with minerals Liqd liquid      15 mLs by Per J Tube route daily       pantoprazole Susp suspension    PROTONIX    1200 mL    20 mLs (40 mg) by Per Feeding Tube route 2 times daily       polyethylene glycol Packet    MIRALAX/GLYCOLAX    7 packet    Take 17 g by mouth 2 times daily as needed (constipation)       potassium & sodium phosphates 280-160-250 MG Packet    NEUTRA-PHOS     Take 1 packet by mouth 3 times daily 0900, 1500, 2100.       testosterone cypionate 200 MG/ML injection    DEPOTESTOTERONE     Inject 200 mg into the muscle every 14 days Mondays       VITAMIN D (CHOLECALCIFEROL) PO      Take 2,000 Units by mouth daily       * Notice:  This list has 4 medication(s) that are the same as other medications prescribed for you. Read the directions  carefully, and ask your doctor or other care provider to review them with you.

## 2017-06-13 ENCOUNTER — CARE COORDINATION (OUTPATIENT)
Dept: GASTROENTEROLOGY | Facility: CLINIC | Age: 57
End: 2017-06-13

## 2017-06-13 NOTE — PROGRESS NOTES
Difficult to determine, but given his recent admission for aspiration pneumonia he should inform his PCP.    Jus Montana MD PhD  Director of Endoscopy   of Medicine  Interventional and Therapeutic Endoscopy    [facility]  Division of Gastroenterology and Hepatology  [facility] [address], [place] [address]    New Consultations  [phone]  Procedure Scheduling [phone]  Clinical Nurse Coordinator [phone]  Clinical Fax   [phone]  Administrative   [phone]  Administrative Fax  [phone]

## 2017-06-15 ENCOUNTER — HOSPITAL ENCOUNTER (EMERGENCY)
Facility: CLINIC | Age: 57
Discharge: HOME | End: 2017-06-15
Attending: EMERGENCY MEDICINE | Admitting: EMERGENCY MEDICINE
Payer: MEDICARE

## 2017-06-15 ENCOUNTER — APPOINTMENT (OUTPATIENT)
Dept: GENERAL RADIOLOGY | Facility: CLINIC | Age: 57
End: 2017-06-15
Attending: EMERGENCY MEDICINE
Payer: MEDICARE

## 2017-06-15 VITALS
HEIGHT: 70 IN | DIASTOLIC BLOOD PRESSURE: 78 MMHG | SYSTOLIC BLOOD PRESSURE: 110 MMHG | TEMPERATURE: 97.9 F | BODY MASS INDEX: 22.19 KG/M2 | RESPIRATION RATE: 20 BRPM | WEIGHT: 155 LBS | OXYGEN SATURATION: 95 % | HEART RATE: 95 BPM

## 2017-06-15 DIAGNOSIS — Z93.1 GASTROSTOMY TUBE IN PLACE (H): ICD-10-CM

## 2017-06-15 PROCEDURE — 99283 EMERGENCY DEPT VISIT LOW MDM: CPT

## 2017-06-15 PROCEDURE — 74000 XR ABDOMEN 1 VW: CPT

## 2017-06-15 ASSESSMENT — ENCOUNTER SYMPTOMS
VOMITING: 0
DIARRHEA: 0
ACTIVITY CHANGE: 0

## 2017-06-15 NOTE — DISCHARGE INSTRUCTIONS
You will receive a call from interventional radiology tomorrow to schedule a change in Keyon's G-tube.  Otherwise, you may use it as normal.  Do not hesitate to return to the ER for any other emergent concerns.

## 2017-06-15 NOTE — ED AVS SNAPSHOT
Emergency Department    [address] [facility] [address]    Phone:  [phone]    Fax:  [phone]                                       Keyon Farias   MRN: [id number]    Department:   Emergency Department   Date of Visit:  6/15/2017           Patient Information     Date Of Birth          1960        Your diagnoses for this visit were:     Gastrostomy tube in place (H)        You were seen by Trierweiler, Chad A, MD.      Follow-up Information     Schedule an appointment as soon as possible for a visit with Anuj Dunn MD.    Specialty:  Radiology    Contact information:    [facility]  [address] [facility] [address]  [facility] [id number] [phone]          Follow up with  Emergency Department.    Specialty:  EMERGENCY MEDICINE    Why:  If symptoms worsen    Contact information:    [address] [facility] [id number] [phone]        Discharge Instructions       You will receive a call from interventional radiology tomorrow to schedule a change in Keyon's G-tube.  Otherwise, you may use it as normal.  Do not hesitate to return to the ER for any other emergent concerns.        Discharge References/Attachments     CARING FOR YOUR GASTROSTOMY TUBE (G-TUBE), DISCHARGE INSTRUCTIONS: (ENGLISH)      24 Hour Appointment Hotline       To make an appointment at any [facility], call [phone] ([phone]). If you don't have a family doctor or clinic, we will help you find one. [facility] clinics are conveniently located to serve the needs of you and your family.             Review of your medicines      Our records show that you are taking the medicines listed below. If these are incorrect, please call your family doctor or clinic.        Dose / Directions Last dose taken    ACETAMINOPHEN PO   Dose:  650 mg        650 mg by Per Feeding Tube route every 4 hours as needed for pain   Refills:  0        aspirin 10 mg/mL Susp   Dose:  81 mg        8.1  mLs (81 mg) by Per J Tube route daily   Refills:  0        bacitracin ointment        Apply topically daily as needed for wound care To PEG site.   Refills:  0        bisacodyl 10 MG Suppository   Commonly known as:  DULCOLAX   Dose:  10 mg   Quantity:  30 suppository        Place 1 suppository (10 mg) rectally daily as needed for constipation   Refills:  0        * Brivaracetam 10 MG/ML solution   Commonly known as:  BRIVIACT   Dose:  100 mg   Quantity:  300 mL        10 mLs (100 mg) by Per Feeding Tube route every evening   Refills:  0        * Brivaracetam 10 MG/ML solution   Commonly known as:  BRIVIACT   Dose:  100 mg   Quantity:  300 mL        10 mLs (100 mg) by Per Feeding Tube route every morning   Refills:  0        calcium carbonate 1250 (500 CA) MG/5ML Susp suspension   Dose:  1250 mg   Quantity:  450 mL        5 mLs (1,250 mg) by Per J Tube route 3 times daily (with meals)   Refills:  0        carBAMazepine 100 MG/5ML suspension   Commonly known as:  TEGretol   Dose:  150 mg   Quantity:  900 mL        Take 7.5 mLs (150 mg) by mouth every 6 hours   Refills:  0        fiber modular packet   Dose:  1 packet        1 packet by Per Feeding Tube route 3 times daily   Refills:  0        * hydrocortisone 2 mg/mL Susp   Commonly known as:  CORTEF   Dose:  20 mg        10 mLs (20 mg) by Per J Tube route every morning   Refills:  0        * hydrocortisone 2 mg/mL Susp   Commonly known as:  CORTEF   Dose:  10 mg        Take 5 mLs (10 mg) by mouth every evening   Refills:  0        levothyroxine 25 mcg/mL Susp   Commonly known as:  SYNTHROID   Dose:  150 mcg        6 mLs (150 mcg) by Per J Tube route every morning (before breakfast)   Refills:  0        melatonin 1 MG/ML Liqd liquid   Dose:  6 mg        6 mLs (6 mg) by Per J Tube route every evening   Refills:  0        miconazole 2 % powder   Commonly known as:  MICATIN; MICRO GUARD        Apply topically every hour as needed for other (topical candidiasis)    Refills:  0        multivitamins with minerals Liqd liquid   Dose:  15 mL        15 mLs by Per J Tube route daily   Refills:  0        pantoprazole Susp suspension   Commonly known as:  PROTONIX   Dose:  40 mg   Quantity:  1200 mL        20 mLs (40 mg) by Per Feeding Tube route 2 times daily   Refills:  0        polyethylene glycol Packet   Commonly known as:  MIRALAX/GLYCOLAX   Dose:  17 g   Quantity:  7 packet        Take 17 g by mouth 2 times daily as needed (constipation)   Refills:  0        potassium & sodium phosphates 280-160-250 MG Packet   Commonly known as:  NEUTRA-PHOS   Dose:  1 packet        Take 1 packet by mouth 3 times daily 0900, 1500, 2100.   Refills:  0        testosterone cypionate 200 MG/ML injection   Commonly known as:  DEPOTESTOTERONE   Dose:  200 mg        Inject 200 mg into the muscle every 14 days Mondays   Refills:  0        VITAMIN D (CHOLECALCIFEROL) PO   Dose:  2000 Units        Take 2,000 Units by mouth daily   Refills:  0        * Notice:  This list has 4 medication(s) that are the same as other medications prescribed for you. Read the directions carefully, and ask your doctor or other care provider to review them with you.      ASK your doctor about these medications        Dose / Directions Last dose taken    amoxicillin-clavulanate 400-57 MG/5ML suspension   Commonly known as:  AUGMENTIN   Dose:  500 mg   Indication:  Pneumonia caused by Inhaling a Substance Into the Lungs   Quantity:  132.3 mL   Ask about: Should I take this medication?        Take 6.3 mLs (500 mg) by mouth every 8 hours for 7 days   Refills:  0                Procedures and tests performed during your visit     Abdomen XR 1 vw      Orders Needing Specimen Collection     None      Pending Results     No orders found from 6/13/2017 to 6/16/2017.            Pending Culture Results     No orders found from 6/13/2017 to 6/16/2017.            Pending Results Instructions     If you had any lab results that were not  finalized at the time of your Discharge, you can call the ED Lab Result RN at [phone]. You will be contacted by this team for any positive Lab results or changes in treatment. The nurses are available 7 days a week from 10A to 6:30P.  You can leave a message 24 hours per day and they will return your call.        Test Results From Your Hospital Stay        6/15/2017 11:14 AM      Narrative     XR ABDOMEN 1 VW 6/15/2017 11:06 AM    HISTORY: Tube placement.    COMPARISON: June 4, 2017.        Impression     IMPRESSION: Multiple tubes overlying the left upper abdomen as before,  likely terminating within the stomach. The bowel gas pattern is  unremarkable.    JULISA CONWAY MD                Clinical Quality Measure: Blood Pressure Screening     Your blood pressure was checked while you were in the emergency department today. The last reading we obtained was  BP: 110/78 . Please read the guidelines below about what these numbers mean and what you should do about them.  If your systolic blood pressure (the top number) is less than 120 and your diastolic blood pressure (the bottom number) is less than 80, then your blood pressure is normal. There is nothing more that you need to do about it.  If your systolic blood pressure (the top number) is 120-139 or your diastolic blood pressure (the bottom number) is 80-89, your blood pressure may be higher than it should be. You should have your blood pressure rechecked within a year by a primary care provider.  If your systolic blood pressure (the top number) is 140 or greater or your diastolic blood pressure (the bottom number) is 90 or greater, you may have high blood pressure. High blood pressure is treatable, but if left untreated over time it can put you at risk for heart attack, stroke, or kidney failure. You should have your blood pressure rechecked by a primary care provider within the next 4 weeks.  If your provider in the emergency department today gave you specific  "instructions to follow-up with your doctor or provider even sooner than that, you should follow that instruction and not wait for up to 4 weeks for your follow-up visit.        Thank you for choosing [facility]       Thank you for choosing [facility] for your care. Our goal is always to provide you with excellent care. Hearing back from our patients is one way we can continue to improve our services. Please take a few minutes to complete the written survey that you may receive in the mail after you visit with us. Thank you!        [employer] Information     [employer] lets you send messages to your doctor, view your test results, renew your prescriptions, schedule appointments and more. To sign up, go to [email or web address] . Click on \"Log in\" on the left side of the screen, which will take you to the Welcome page. Then click on \"Sign up Now\" on the right side of the page.     You will be asked to enter the access code listed below, as well as some personal information. Please follow the directions to create your username and password.     Your access code is: [email or web address]  Expires: 2017 10:50 AM     Your access code will  in 90 days. If you need help or a new code, please call your [facility] clinic or [phone].        Care EveryWhere ID     This is your Care EveryWhere ID. This could be used by other organizations to access your [facility] medical records  [id number]        After Visit Summary       This is your record. Keep this with you and show to your community pharmacist(s) and doctor(s) at your next visit.                  "

## 2017-06-15 NOTE — ED PROVIDER NOTES
History     Chief Complaint:  G-tube Problem      HPI   Keyon Farias is a 56 year old male with past medical history significant for TBI with residual aphagia and hemiplegia and seizures who presents with G-tube problem.  The patient's mother reports the patient's G-tube started to come out 2 days ago and his home care nurse said it needed to be replaced.  This is used for all his medications as well as his nutrition.  The tube has continued to flush well and was about due to be changed out anyway since it was placed about 3 months ago.  He is otherwise at baseline and has no other new symptoms or concerns.    Allergies:  Dilantin   Valproic acid - bone marrow suppression, elevated ammonia     Medications:    Carbamazepine  Brivaracetam  Pantoprazole   Potassium & sodium phosphates  Levothyroxine   Hydrocortisone   Testosterone   Polyethylene glycol   Bisacodyl suppository   Aspirin  Tylenol   Multivitamin   Fiber   Calcium carbonate   Melatonin   Vitamin D    Past Medical History:    Aphasia  Spastic hemiplegia   Traumatic brain injury  Panhypopituitarism   Seizures  Cerebral artery occlusion with cerebral infarction  Ventricular fibrillation  Deep vein thrombosis   Necrotizing pancreatitis     Past Surgical History:    Esophagogastroduodenoscopy, endoscopic ultrasound 2/7/17  Endoscopic ultrasound 1/30/17  Esophagogastroduodenoscopy 12/6/16  Tracheostomy 12/2/16  Esophagogastroduodenoscopy 3/13/14  Appendectomy 7/30/13  Facial reconstruction surgery 1989  Vascular surgery    Family History:    History reviewed. No pertinent family history.     Social History:  Marital Status: single  Presents to the ED with his mother.  Tobacco Use: Former smoker, quit 1989  Alcohol Use: No alcohol use.      Review of Systems   Constitutional: Negative for activity change.   Gastrointestinal: Negative for diarrhea and vomiting.   Skin:        Positive for G-tube issue.   ROS limited by non-verbal state and is provided by the  patient's mother.    Physical Exam   First Vitals:  BP: 110/78 mmHg  Heart Rate: 95   Resp: 20  SpO2: 95% RA  Temp: 97.9  F (temporal)       Physical Exam  Eye:  Pupils are equal, round, and reactive.  Extraocular movements intact.    ENT:  No rhinorrhea.  Moist mucus membranes.  Normal tongue and tonsil.    Cardiac:  Regular rate and rhythm.  No murmurs, gallops, or rubs.    Pulmonary:  Clear to auscultation bilaterally.  No wheezes, rales, or rhonchi.    Abdomen:  Positive bowel sounds.  Abdomen is soft and non-distended, without focal tenderness.  G-tube well seated in the left upper abdomen without redness or drainage.      Musculoskeletal:  Normal movement of all extremities without evidence for deficit.    Skin:  Warm and dry without rashes.    Neurologic:  Non-focal exam without asymmetric weakness or numbness. Contractures throughout though at baseline.    Psychiatric:  Nonverbal though at baseline per caregiver.    Emergency Department Course     Imaging:  Abdominal x-ray (1 view):  Multiple tubes overlying the left upper abdomen as before, likely terminating within the stomach. The bowel gas pattern is unremarkable.  Report per radiology.     Radiographic findings were communicated with the family who voiced understanding of the findings.    Emergency Department Course:  Nursing notes and vitals reviewed.  I performed an exam of the patient as documented above.     The patient was sent for an abdominal x-ray while in the emergency department, findings above.      (1022) I spoke with Dr. Dunn of interventional radiology regarding the patient.      Findings and plan explained to the mother. Patient discharged home with instructions regarding supportive care, medications, and reasons to return. The importance of close follow-up was reviewed.    Impression & Plan      Medical Decision Making:  This traumatic brain injury gentleman presents to us for evaluation of his G-tube.  It has been working normally,  though a home health care nurse advised the mother that this should be changed.  The patient otherwise has no complaints and appears clinically well on exam.  An x-ray shows appropriate placement in the stomach without other abnormality.  I spoke with Mona of interventional radiology who notes that their service are too busy today to perform any non-emergent procedures and recommends the patient be discharged with continued use of the G-tube.  Interventional radiology will call the patient and schedule replacement of this G-tube sometime in the next week or 2.  The family is very comfortable with this plan and questions were answered.  They were advised to return to us for any other concerns.      Diagnosis:    ICD-10-CM    1. Gastrostomy tube in place (H) Z93.1      Disposition:  Discharged to home.     Discharge Medications:  None       I, Lisa Rodriguez, am serving as a scribe on 6/15/2017 at 9:50 AM to personally document services performed by Dr. Trierweiler based on my observations and the provider's statements to me.    Lisa Rodriguez  6/15/2017    EMERGENCY DEPARTMENT       Trierweiler, Chad A, MD  06/16/17 1014

## 2017-06-15 NOTE — PROGRESS NOTES
Savannah (mom) notified to update Keyon's PCP.  Verbalized understanding.    Dora Kemp LPN  Advanced Endoscopy~ Panc/Bili  Dr. Marcus, Dr. Diego & Dr. Montana  [phone]

## 2017-06-15 NOTE — ED AVS SNAPSHOT
Emergency Department    [address] [place] [address]    Phone:  [phone]    Fax:  [phone]                                       Keyon Farias   MRN: [id number]    Department:   Emergency Department   Date of Visit:  6/15/2017           After Visit Summary Signature Page     I have received my discharge instructions, and my questions have been answered. I have discussed any challenges I see with this plan with the nurse or doctor.    ..........................................................................................................................................  Patient/Patient Representative Signature      ..........................................................................................................................................  Patient Representative Print Name and Relationship to Patient    ..................................................               ................................................  Date                                            Time    ..........................................................................................................................................  Reviewed by Signature/Title    ...................................................              ..............................................  Date                                                            Time

## 2017-07-20 NOTE — PROGRESS NOTES
[facility]          OUTPATIENT SWALLOW  EVALUATION  PLAN OF TREATMENT FOR OUTPATIENT REHABILITATION  (COMPLETE FOR INITIAL CLAIMS ONLY)  Patient's Last Name, First Name, M.I.  YOB: 1960  SyedKeyon lee     Provider's Name   [facility]   Medical Record No.  [id number]     Start of Care Date:      Onset Date:   6/5/2017   /Type:     ___PT   ____OT  ___X_SLP Medical Diagnosis:        Treatment Diagnosis:    Visits from SOC:  1     _________________________________________________________________________________  Plan of Treatment/Functional Goals:                           Goals   1.                             2.                             3.                             4.                             5.                            6.                              7.                              8.                                      No name on file.       I CERTIFY THE NEED FOR THESE SERVICES FURNISHED UNDER        THIS PLAN OF TREATMENT AND WHILE UNDER MY CARE     (Physician co-signature of this document indicates review and certification of the therapy plan).                                      Initial Assessment        See Epic Evaluation

## 2017-07-26 ENCOUNTER — HOSPITAL ENCOUNTER (OUTPATIENT)
Dept: LAB | Facility: CLINIC | Age: 57
End: 2017-07-26
Attending: RADIOLOGY | Admitting: RADIOLOGY
Payer: MEDICARE

## 2017-07-26 ENCOUNTER — HOSPITAL ENCOUNTER (OUTPATIENT)
Facility: CLINIC | Age: 57
Discharge: HOME | End: 2017-07-26
Attending: RADIOLOGY | Admitting: RADIOLOGY
Payer: MEDICARE

## 2017-07-26 DIAGNOSIS — G40.211: Primary | ICD-10-CM

## 2017-07-26 DIAGNOSIS — Z51.89 TREATMENT: ICD-10-CM

## 2017-07-26 LAB — CARBAMAZEPINE SERPL-MCNC: 14.9 MG/L (ref 4–12)

## 2017-07-26 PROCEDURE — 36415 COLL VENOUS BLD VENIPUNCTURE: CPT | Performed by: PSYCHIATRY & NEUROLOGY

## 2017-07-26 PROCEDURE — 80156 ASSAY CARBAMAZEPINE TOTAL: CPT | Performed by: PSYCHIATRY & NEUROLOGY

## 2017-07-27 ENCOUNTER — APPOINTMENT (OUTPATIENT)
Dept: INTERVENTIONAL RADIOLOGY/VASCULAR | Facility: CLINIC | Age: 57
End: 2017-07-27
Attending: RADIOLOGY
Payer: MEDICARE

## 2017-07-27 ENCOUNTER — HOSPITAL ENCOUNTER (OUTPATIENT)
Facility: CLINIC | Age: 57
Discharge: HOME | End: 2017-07-27
Attending: RADIOLOGY | Admitting: RADIOLOGY
Payer: MEDICARE

## 2017-07-27 VITALS
WEIGHT: 163 LBS | RESPIRATION RATE: 16 BRPM | BODY MASS INDEX: 22.82 KG/M2 | SYSTOLIC BLOOD PRESSURE: 119 MMHG | DIASTOLIC BLOOD PRESSURE: 68 MMHG | TEMPERATURE: 95.3 F | HEIGHT: 71 IN | OXYGEN SATURATION: 95 % | HEART RATE: 64 BPM

## 2017-07-27 VITALS
SYSTOLIC BLOOD PRESSURE: 122 MMHG | DIASTOLIC BLOOD PRESSURE: 72 MMHG | HEART RATE: 82 BPM | RESPIRATION RATE: 18 BRPM | OXYGEN SATURATION: 98 %

## 2017-07-27 PROCEDURE — 27210815 ZZ H TUBE GASTRO CR13

## 2017-07-27 PROCEDURE — 27210742 ZZH CATH CR1

## 2017-07-27 PROCEDURE — 40000854 ZZH STATISTIC SIMPLE TUBE INSERTION/CHARGE, PORT, CATH, FISTULOGRAM

## 2017-07-27 PROCEDURE — C1769 GUIDE WIRE: HCPCS

## 2017-07-27 PROCEDURE — 27210905 ZZH KIT CR7

## 2017-07-27 PROCEDURE — 49452 REPLACE G-J TUBE PERC: CPT

## 2017-07-27 NOTE — PROGRESS NOTES
Pt returned to care suites. All DC instruction given to pt's mom. Pt dressed and left per wc with all belongings.

## 2017-07-27 NOTE — IP AVS SNAPSHOT
[place] [address]    [place] [address]    Phone:  [phone]                                       After Visit Summary   7/27/2017    Keyon Farias    MRN: [id number]           After Visit Summary Signature Page     I have received my discharge instructions, and my questions have been answered. I have discussed any challenges I see with this plan with the nurse or doctor.    ..........................................................................................................................................  Patient/Patient Representative Signature      ..........................................................................................................................................  Patient Representative Print Name and Relationship to Patient    ..................................................               ................................................  Date                                            Time    ..........................................................................................................................................  Reviewed by Signature/Title    ...................................................              ..............................................  Date                                                            Time

## 2017-07-27 NOTE — DISCHARGE INSTRUCTIONS
G Tube Insertion/Exchange Discharge Instructions     After you go home:      You may resume your normal diet    Drink plenty of fluids, especially water    Have an adult stay with you for 6 hours if you received sedation      Care of Tube Site:      For the first 48 hrs, check your puncture site every couple hours while you are awake     Check the tube site twice a day for signs of infection    Keep the dressing around the tube dry    Change the dressing every 2-3 days if it is gauze/tape. If there is a Stay Fix dressing intact - this should be changed weekly.    Change the dressing if it becomes wet or dirty    You may shower but do not get the dressing wet. Place a waterproof cover over the dressing (such as plastic wrap).     No tub baths, whirlpools or swimming until the tube is removed     Activity:      You may go back to normal activity in 24 hours    Wait 48 hours before lifting, straining, exercise or other strenuous activity    Medicines:      You may resume all medications    Resume your Warfarin/Coumadin at your regular dose today. Follow up with your provider to have your INR rechecked    Resume your Platelet Inhibitors and Aspirin tomorrow at your regular dose    For minor pain, you may take Acetaminophen (Tylenol) or Ibuprofen (Advil)               Call the provider who ordered this procedure if:      The site is red, swollen, hot or tender    There is foul-smelling drainage from the tube site    You have pain that is getting worse or that does not improve with pain medication    You have chills or a fever greater than 101 F (38 C)    Fluid stops draining or is leaking around the tube onto your skin    The tube falls out     Any questions or concerns    Call  911 or go to the Emergency Room if you have:      Severe pain or trouble breathing    Bleeding that you cannot control    Other Instructions:      If the tube falls out - cover the opening with gauze & tape    Record drainage output amounts &  bring sheet to return appointments    Take your temperature daily      If you have questions call:          [facility] @ [phone]      The provider who performed your procedure was _________________.

## 2017-07-27 NOTE — IP AVS SNAPSHOT
MRN:[id number]                      After Visit Summary   7/27/2017    Keyon Farias    MRN: [id number]           Visit Information        Department      7/27/2017 10:04 AM [facility]          Review of your medicines      UNREVIEWED medicines. Ask your doctor about these medicines        Dose / Directions    ACETAMINOPHEN PO        Dose:  650 mg   650 mg by Per Feeding Tube route every 4 hours as needed for pain   Refills:  0       aspirin 10 mg/mL Susp   Used for:  Routine adult health maintenance        Dose:  81 mg   8.1 mLs (81 mg) by Per J Tube route daily   Refills:  0       bacitracin ointment        Apply topically daily as needed for wound care To PEG site.   Refills:  0       bisacodyl 10 MG Suppository   Commonly known as:  DULCOLAX   Used for:  Constipation, unspecified constipation type        Dose:  10 mg   Place 1 suppository (10 mg) rectally daily as needed for constipation   Quantity:  30 suppository   Refills:  0       calcium carbonate 1250 (500 CA) MG/5ML Susp suspension   Used for:  Malnutrition (H)        Dose:  1250 mg   5 mLs (1,250 mg) by Per J Tube route 3 times daily (with meals)   Quantity:  450 mL   Refills:  0       carBAMazepine 100 MG/5ML suspension   Commonly known as:  TEGretol   Used for:  Pneumonia of right lower lobe due to infectious organism (H)        Dose:  150 mg   Take 7.5 mLs (150 mg) by mouth every 6 hours   Quantity:  900 mL   Refills:  0       fiber modular packet   Used for:  Necrotizing pancreatitis        Dose:  1 packet   1 packet by Per Feeding Tube route 3 times daily   Refills:  0       * hydrocortisone 2 mg/mL Susp   Commonly known as:  CORTEF   Used for:  Panhypopituitarism (H)        Dose:  20 mg   10 mLs (20 mg) by Per J Tube route every morning   Refills:  0       * hydrocortisone 2 mg/mL Susp   Commonly known as:  CORTEF   Used for:  Panhypopituitarism (H)        Dose:  10 mg   Take 5 mLs (10 mg) by mouth every evening    Refills:  0       levothyroxine 25 mcg/mL Susp   Commonly known as:  SYNTHROID   Used for:  Panhypopituitarism (H)        Dose:  150 mcg   6 mLs (150 mcg) by Per J Tube route every morning (before breakfast)   Refills:  0       melatonin 1 MG/ML Liqd liquid   Used for:  Insomnia, unspecified type        Dose:  6 mg   6 mLs (6 mg) by Per J Tube route every evening   Refills:  0       miconazole 2 % powder   Commonly known as:  MICATIN; MICRO GUARD   Used for:  Rash        Apply topically every hour as needed for other (topical candidiasis)   Refills:  0       multivitamins with minerals Liqd liquid   Used for:  Necrotizing pancreatitis, Malnutrition (H)        Dose:  15 mL   15 mLs by Per J Tube route daily   Refills:  0       pantoprazole Susp suspension   Commonly known as:  PROTONIX        Dose:  20 mg   Take 20 mg by mouth 2 times daily   Refills:  0       polyethylene glycol Packet   Commonly known as:  MIRALAX/GLYCOLAX   Used for:  Constipation, unspecified constipation type        Dose:  17 g   Take 17 g by mouth 2 times daily as needed (constipation)   Quantity:  7 packet   Refills:  0       potassium & sodium phosphates 280-160-250 MG Packet   Commonly known as:  NEUTRA-PHOS        Dose:  1 packet   Take 1 packet by mouth 3 times daily 0900, 1500, 2100.   Refills:  0       testosterone cypionate 200 MG/ML injection   Commonly known as:  DEPOTESTOTERONE        Dose:  200 mg   Inject 200 mg into the muscle every 14 days Mondays   Refills:  0       VITAMIN D (CHOLECALCIFEROL) PO        Dose:  2000 Units   Take 2,000 Units by mouth daily   Refills:  0       * Notice:  This list has 2 medication(s) that are the same as other medications prescribed for you. Read the directions carefully, and ask your doctor or other care provider to review them with you.             Protect others around you: Learn how to safely use, store and throw away your medicines at www.disposemymeds.org.         Follow-ups after your  visit        Your next 10 appointments already scheduled     Jul 27, 2017 11:00 AM CDT   IR GASTRO JEJUNOSTOMY TUBE CHANGE with SHIR1   [facility] ([facility])    [address] [address] [phone]           1. Laboratory test are to be obtained by your doctor prior to the exam (Hgb/Hct, platelet count, and INR) 2. If you are or may be pregnant, contact your doctor or a Radiology nurse prior to the day of the exam. 3. If you have diabetes, check with your doctor or a Radiology nurse to see if your insulin needs to be adjusted for the exam. 4. If you are taking Coumadin (to thin your blood) please contact your doctor or a Radiology nurse at least 5 days before the exam for special instructions. 5. If you are taking aspirin and/or Clopidegrel (Plavix) please contact your doctor at least 7 days before the exam for special instructions. 6. The day before your exam you may eat your regular diet. Drink no alcoholic beverages for 24 hours prior to the exam. 7. You will be asked to drink 1 bottle of oral CT contrast the night before your procedure (12 hours prior to procedure). You can obtain this contrast in the Imaging Department from your exam site 8. Do not eat or drink anything (or take any tube feedings) for 8 hours prior to the exam. It is very important that your stomach be totally empty for this procedure. 9. DO NOT take any medications the morning of the exam. 10. The morning of the exam you may brush your teeth. 11. Bring a list of all drugs you are taking; including supplements and over-the-counter medications. 12. Wear comfortable clothes and leave your valuables at home. 13. Tell the Radiology nurse if you have any allergies. 14. You will be asked to empty your bladder before the exam begins. 15. Following the exam you will be admitted to the hospital for 1-3 days. 16. Nothing to eat or drink for oral intake and gastric feedings for  4  hours after insertion of Percutaneous Gastrostomy Tube (G tube). 17. If the tube was placed for decompression then it will remain attached to a drainage bag. 18. You will be taught to care for your tube before you leave the hospital. You may need to obtain supplies from your local pharmacy.               Care Instructions        Further instructions from your care team         G Tube Insertion/Exchange Discharge Instructions     After you go home:      You may resume your normal diet    Drink plenty of fluids, especially water    Have an adult stay with you for 6 hours if you received sedation      Care of Tube Site:      For the first 48 hrs, check your puncture site every couple hours while you are awake     Check the tube site twice a day for signs of infection    Keep the dressing around the tube dry    Change the dressing every 2-3 days if it is gauze/tape. If there is a Stay Fix dressing intact - this should be changed weekly.    Change the dressing if it becomes wet or dirty    You may shower but do not get the dressing wet. Place a waterproof cover over the dressing (such as plastic wrap).     No tub baths, whirlpools or swimming until the tube is removed     Activity:      You may go back to normal activity in 24 hours    Wait 48 hours before lifting, straining, exercise or other strenuous activity    Medicines:      You may resume all medications    Resume your Warfarin/Coumadin at your regular dose today. Follow up with your provider to have your INR rechecked    Resume your Platelet Inhibitors and Aspirin tomorrow at your regular dose    For minor pain, you may take Acetaminophen (Tylenol) or Ibuprofen (Advil)               Call the provider who ordered this procedure if:      The site is red, swollen, hot or tender    There is foul-smelling drainage from the tube site    You have pain that is getting worse or that does not improve with pain medication    You have chills or a fever greater than 101 F (38  "C)    Fluid stops draining or is leaking around the tube onto your skin    The tube falls out     Any questions or concerns    Call  911 or go to the Emergency Room if you have:      Severe pain or trouble breathing    Bleeding that you cannot control    Other Instructions:      If the tube falls out - cover the opening with gauze & tape    Record drainage output amounts & bring sheet to return appointments    Take your temperature daily      If you have questions call:          [facility] Radiology Dept @ [phone]      The provider who performed your procedure was _________________.           Additional Information About Your Visit        [employer] Information     [employer] lets you send messages to your doctor, view your test results, renew your prescriptions, schedule appointments and more. To sign up, go to [email or web address] . Click on \"Log in\" on the left side of the screen, which will take you to the Welcome page. Then click on \"Sign up Now\" on the right side of the page.     You will be asked to enter the access code listed below, as well as some personal information. Please follow the directions to create your username and password.     Your access code is: [email or web address]  Expires: 2017 10:50 AM     Your access code will  in 90 days. If you need help or a new code, please call your [facility] clinic or [phone].        Care EveryWhere ID     This is your Care EveryWhere ID. This could be used by other organizations to access your [facility] medical records  [id number]        Your Vitals Were     Blood Pressure Pulse Temperature Respirations Height Weight    119/68 (BP Location: Left arm) 64 95.3  F (35.2  C) (Oral) 16 1.803 m (5' 11\") 73.9 kg (163 lb)    Pulse Oximetry BMI (Body Mass Index)                95% 22.73 kg/m2           Primary Care Provider    [facility]      Equal Access to Services     BRENDA LAO AH: Angely Rivas, krishna buenrostro, tobias nova " yaw sofiablanca ludwinmauricio fowler'aan ah. So [facility] [phone].    ATENCIÓN: Si dirkla bernice, tiene a king disposición servicios gratuitos de asistencia lingüística. Marsha al [phone].    We comply with applicable federal civil rights laws and [place] laws. We do not discriminate on the basis of race, color, national origin, age, disability sex, sexual orientation or gender identity.            Thank you!     Thank you for choosing [facility] for your care. Our goal is always to provide you with excellent care. Hearing back from our patients is one way we can continue to improve our services. Please take a few minutes to complete the written survey that you may receive in the mail after you visit with us. Thank you!             Medication List: This is a list of all your medications and when to take them. Check marks below indicate your daily home schedule. Keep this list as a reference.      Medications           Morning Afternoon Evening Bedtime As Needed    ACETAMINOPHEN PO   650 mg by Per Feeding Tube route every 4 hours as needed for pain                                aspirin 10 mg/mL Susp   8.1 mLs (81 mg) by Per J Tube route daily                                bacitracin ointment   Apply topically daily as needed for wound care To PEG site.                                bisacodyl 10 MG Suppository   Commonly known as:  DULCOLAX   Place 1 suppository (10 mg) rectally daily as needed for constipation                                calcium carbonate 1250 (500 CA) MG/5ML Susp suspension   5 mLs (1,250 mg) by Per J Tube route 3 times daily (with meals)                                carBAMazepine 100 MG/5ML suspension   Commonly known as:  TEGretol   Take 7.5 mLs (150 mg) by mouth every 6 hours                                fiber modular packet   1 packet by Per Feeding Tube route 3 times daily                                * hydrocortisone 2 mg/mL Susp   Commonly known as:  CORTEF   10 mLs (20  mg) by Per J Tube route every morning                                * hydrocortisone 2 mg/mL Susp   Commonly known as:  CORTEF   Take 5 mLs (10 mg) by mouth every evening                                levothyroxine 25 mcg/mL Susp   Commonly known as:  SYNTHROID   6 mLs (150 mcg) by Per J Tube route every morning (before breakfast)                                melatonin 1 MG/ML Liqd liquid   6 mLs (6 mg) by Per J Tube route every evening                                miconazole 2 % powder   Commonly known as:  MICATIN; MICRO GUARD   Apply topically every hour as needed for other (topical candidiasis)                                multivitamins with minerals Liqd liquid   15 mLs by Per J Tube route daily                                pantoprazole Susp suspension   Commonly known as:  PROTONIX   Take 20 mg by mouth 2 times daily                                polyethylene glycol Packet   Commonly known as:  MIRALAX/GLYCOLAX   Take 17 g by mouth 2 times daily as needed (constipation)                                potassium & sodium phosphates 280-160-250 MG Packet   Commonly known as:  NEUTRA-PHOS   Take 1 packet by mouth 3 times daily 0900, 1500, 2100.                                testosterone cypionate 200 MG/ML injection   Commonly known as:  DEPOTESTOTERONE   Inject 200 mg into the muscle every 14 days Mondays                                VITAMIN D (CHOLECALCIFEROL) PO   Take 2,000 Units by mouth daily                                * Notice:  This list has 2 medication(s) that are the same as other medications prescribed for you. Read the directions carefully, and ask your doctor or other care provider to review them with you.

## 2017-08-21 ENCOUNTER — HOSPITAL ENCOUNTER (OUTPATIENT)
Facility: CLINIC | Age: 57
Discharge: HOME | End: 2017-08-21
Attending: RADIOLOGY | Admitting: RADIOLOGY
Payer: MEDICARE

## 2017-08-21 ENCOUNTER — APPOINTMENT (OUTPATIENT)
Dept: INTERVENTIONAL RADIOLOGY/VASCULAR | Facility: CLINIC | Age: 57
End: 2017-08-21
Attending: RADIOLOGY
Payer: MEDICARE

## 2017-08-21 VITALS
TEMPERATURE: 97.9 F | OXYGEN SATURATION: 95 % | SYSTOLIC BLOOD PRESSURE: 134 MMHG | DIASTOLIC BLOOD PRESSURE: 74 MMHG | HEART RATE: 74 BPM | RESPIRATION RATE: 18 BRPM

## 2017-08-21 DIAGNOSIS — Z51.89 TREATMENT: ICD-10-CM

## 2017-08-21 PROCEDURE — 49465 FLUORO EXAM OF G/COLON TUBE: CPT

## 2017-08-21 PROCEDURE — 27210815 ZZ H TUBE GASTRO CR13

## 2017-08-21 PROCEDURE — 49452 REPLACE G-J TUBE PERC: CPT

## 2017-08-21 PROCEDURE — 40000854 ZZH STATISTIC SIMPLE TUBE INSERTION/CHARGE, PORT, CATH, FISTULOGRAM

## 2017-08-21 PROCEDURE — C1769 GUIDE WIRE: HCPCS

## 2017-08-21 PROCEDURE — 27210905 ZZH KIT CR7

## 2017-08-21 NOTE — IP AVS SNAPSHOT
[place] [address]    [place] [address]    Phone:  [phone]                                       After Visit Summary   8/21/2017    Keyon Farias    MRN: [id number]           After Visit Summary Signature Page     I have received my discharge instructions, and my questions have been answered. I have discussed any challenges I see with this plan with the nurse or doctor.    ..........................................................................................................................................  Patient/Patient Representative Signature      ..........................................................................................................................................  Patient Representative Print Name and Relationship to Patient    ..................................................               ................................................  Date                                            Time    ..........................................................................................................................................  Reviewed by Signature/Title    ...................................................              ..............................................  Date                                                            Time

## 2017-08-21 NOTE — IP AVS SNAPSHOT
MRN:[id number]                      After Visit Summary   8/21/2017    Keyon Farias    MRN: [id number]           Visit Information        Department      8/21/2017 12:05 PM [facility]          Review of your medicines      UNREVIEWED medicines. Ask your doctor about these medicines        Dose / Directions    ACETAMINOPHEN PO        Dose:  650 mg   650 mg by Per Feeding Tube route every 4 hours as needed for pain   Refills:  0       aspirin 10 mg/mL Susp   Used for:  Routine adult health maintenance        Dose:  81 mg   8.1 mLs (81 mg) by Per J Tube route daily   Refills:  0       bacitracin ointment        Apply topically daily as needed for wound care To PEG site.   Refills:  0       bisacodyl 10 MG Suppository   Commonly known as:  DULCOLAX   Used for:  Constipation, unspecified constipation type        Dose:  10 mg   Place 1 suppository (10 mg) rectally daily as needed for constipation   Quantity:  30 suppository   Refills:  0       BRIVIACT 10 MG/ML solution   Generic drug:  Brivaracetam        Dose:  100 mg   Take 100 mg by mouth 2 times daily   Refills:  0       calcium carbonate 1250 (500 CA) MG/5ML Susp suspension   Used for:  Malnutrition (H)        Dose:  1250 mg   5 mLs (1,250 mg) by Per J Tube route 3 times daily (with meals)   Quantity:  450 mL   Refills:  0       carBAMazepine 100 MG/5ML suspension   Commonly known as:  TEGretol   Used for:  Pneumonia of right lower lobe due to infectious organism (H)        Dose:  150 mg   Take 7.5 mLs (150 mg) by mouth every 6 hours   Quantity:  900 mL   Refills:  0       fiber modular packet   Used for:  Necrotizing pancreatitis        Dose:  1 packet   1 packet by Per Feeding Tube route 3 times daily   Refills:  0       * hydrocortisone 2 mg/mL Susp   Commonly known as:  CORTEF   Used for:  Panhypopituitarism (H)        Dose:  20 mg   10 mLs (20 mg) by Per J Tube route every morning   Refills:  0       * hydrocortisone 2  mg/mL Susp   Commonly known as:  CORTEF   Used for:  Panhypopituitarism (H)        Dose:  10 mg   Take 5 mLs (10 mg) by mouth every evening   Refills:  0       levothyroxine 25 mcg/mL Susp   Commonly known as:  SYNTHROID   Used for:  Panhypopituitarism (H)        Dose:  150 mcg   6 mLs (150 mcg) by Per J Tube route every morning (before breakfast)   Refills:  0       melatonin 1 MG/ML Liqd liquid   Used for:  Insomnia, unspecified type        Dose:  6 mg   6 mLs (6 mg) by Per J Tube route every evening   Refills:  0       miconazole 2 % powder   Commonly known as:  MICATIN; MICRO GUARD   Used for:  Rash        Apply topically every hour as needed for other (topical candidiasis)   Refills:  0       multivitamins with minerals Liqd liquid   Used for:  Necrotizing pancreatitis, Malnutrition (H)        Dose:  15 mL   15 mLs by Per J Tube route daily   Refills:  0       pantoprazole Susp suspension   Commonly known as:  PROTONIX        Dose:  20 mg   Take 20 mg by mouth 2 times daily   Refills:  0       polyethylene glycol Packet   Commonly known as:  MIRALAX/GLYCOLAX   Used for:  Constipation, unspecified constipation type        Dose:  17 g   Take 17 g by mouth 2 times daily as needed (constipation)   Quantity:  7 packet   Refills:  0       potassium & sodium phosphates 280-160-250 MG Packet   Commonly known as:  NEUTRA-PHOS        Dose:  1 packet   Take 1 packet by mouth 3 times daily 0900, 1500, 2100.   Refills:  0       testosterone cypionate 200 MG/ML injection   Commonly known as:  DEPOTESTOTERONE        Dose:  200 mg   Inject 200 mg into the muscle every 14 days Mondays   Refills:  0       VITAMIN D (CHOLECALCIFEROL) PO        Dose:  2000 Units   Take 2,000 Units by mouth daily   Refills:  0       * Notice:  This list has 2 medication(s) that are the same as other medications prescribed for you. Read the directions carefully, and ask your doctor or other care provider to review them with you.             Protect  others around you: Learn how to safely use, store and throw away your medicines at www.disposemymeds.org.         Follow-ups after your visit         Care Instructions        Further instructions from your care team       G-tube Exchange Discharge Instructions     After you go home:      You may resume your normal diet    Care of Insertion Site:      For the first 48 hrs, check your puncture site every couple hours while you are awake     You may remove/change the dressing tomorrow    You may shower tomorrow    No tub baths, whirlpools or swimming until your puncture site has fully healed     Activity       You may go back to normal activity in 24 hours    Wait 48 hours before lifting, straining, exercise or other strenuous activity    Medicines:      You may resume all medications    Resume your Warfarin/Coumadin at your regular dose today. Follow up with your provider to have your INR rechecked    Resume your Platelet Inhibitors and Aspirin tomorrow at your regular dose    For minor pain, you may take Acetaminophen (Tylenol) or Ibuprofen (Advil)                 Call the provider who ordered this procedure if:      Blood or fluid is draining from the site    The site is red, swollen, hot, tender or there is foul-smelling drainage    Chills or a fever greater than 101 F (38 C)    Increased pain at the site    Any questions or concerns    Call  911 or go to the Emergency Room if:      Severe pain or trouble breathing    Bleeding that you cannot control      If you have questions call:          [facility] Radiology Dept @ [phone]        The provider who performed your procedure was _________________.        Caring for your G-tube    Tube Maintenance:    Possible problems with your tube may include:      Clogged with medications or feedings - most obstructions can be cleared with a small (3cc) syringe and warm water. This may be repeated until the tube is unclogged. This can be prevented by frequently  flushing the tube with water (60cc) during the day and always after medications & feedings.      Tube pulls out or falls out -cover the opening with gauze & tape. Call [phone] for further instructions      Skin breakdown and/or yeast infections at the insertion site - use of skin barrier ointments and anti-fungal powders can treat most site irritations.  Ask your pharmacist or provider for assistance (a prescription is not necessary).    In general, tube problems (including pulled tubes) are NOT emergency situations. Unless the pulling out of a tube is accompanied by uncontrollable bleeding, please DO NOT GO TO THE EMERGENCY ROOM!  Call [phone] with problems.    Tube Care:      Change the gauze dressing every 24 hours and if soiled (dirty).  Stabilize all tubes securely by using gauze and tape.  Clean tube site with soap & water using a cotton applicator (Q-tip) as needed to prevent irritation.    Flush feeding tube with 60cc of warm or room temperature water before and after meds.  To prevent the tube from clogging, ask your provider or pharmacist if liquid forms of your medications are available. If not, crush the pills well & be sure to flush the tube before & after all medications.    Flush feeding tube a minimum of every 4 hours and when feeding is completed with 60cc of water to keep the tube clear and avoid clogging.    Pt may use an abdominal (waist) binder to protect the G-tube.    If there is continued oozing or bleeding, redness, yellow/green/foul smelling drainage    STOP the feedings & use of the tube immediately if there is:      Continued oozing or bleeding at the site    Redness    Yellow/green or foul smelling drainage at the site    Uncontrolled stomach pain    Many of the supplies mentioned above can be purchased at your local pharmacy      For issues with your tube, please call:    [facility] at [phone]               Additional Information About Your  "Visit        [employer] Information     [employer] lets you send messages to your doctor, view your test results, renew your prescriptions, schedule appointments and more. To sign up, go to [email or web address] . Click on \"Log in\" on the left side of the screen, which will take you to the Welcome page. Then click on \"Sign up Now\" on the right side of the page.     You will be asked to enter the access code listed below, as well as some personal information. Please follow the directions to create your username and password.     Your access code is: [email or web address]  Expires: 2017  1:10 PM     Your access code will  in 90 days. If you need help or a new code, please call your [facility] clinic or [phone].        Care EveryWhere ID     This is your Care EveryWhere ID. This could be used by other organizations to access your [facility] medical records  [id number]        Your Vitals Were     Blood Pressure Pulse Temperature Respirations Pulse Oximetry       155/76 (BP Location: Right arm) 80 97.9  F (36.6  C) (Axillary) 18 98%        Primary Care Provider    [facility]      Equal Access to Services     [facility]: Hadii aad ku hadasho Solayneali, waaxda luqadaha, qaybta kaalmada adeegyada, yaw moreland . So [facility] [phone].    ATENCIÓN: Si habla español, tiene a king disposición servicios gratuitos de asistencia lingüística. Llame al [phone].    We comply with applicable federal civil rights laws and [place] laws. We do not discriminate on the basis of race, color, national origin, age, disability sex, sexual orientation or gender identity.            Thank you!     Thank you for choosing [facility] for your care. Our goal is always to provide you with excellent care. Hearing back from our patients is one way we can continue to improve our services. Please take a few minutes to complete the written survey that you may receive in the mail after you visit with us. Thank you!   "           Medication List: This is a list of all your medications and when to take them. Check marks below indicate your daily home schedule. Keep this list as a reference.      Medications           Morning Afternoon Evening Bedtime As Needed    ACETAMINOPHEN PO   650 mg by Per Feeding Tube route every 4 hours as needed for pain                                aspirin 10 mg/mL Susp   8.1 mLs (81 mg) by Per J Tube route daily                                bacitracin ointment   Apply topically daily as needed for wound care To PEG site.                                bisacodyl 10 MG Suppository   Commonly known as:  DULCOLAX   Place 1 suppository (10 mg) rectally daily as needed for constipation                                BRIVIACT 10 MG/ML solution   Take 100 mg by mouth 2 times daily   Generic drug:  Brivaracetam                                calcium carbonate 1250 (500 CA) MG/5ML Susp suspension   5 mLs (1,250 mg) by Per J Tube route 3 times daily (with meals)                                carBAMazepine 100 MG/5ML suspension   Commonly known as:  TEGretol   Take 7.5 mLs (150 mg) by mouth every 6 hours                                fiber modular packet   1 packet by Per Feeding Tube route 3 times daily                                * hydrocortisone 2 mg/mL Susp   Commonly known as:  CORTEF   10 mLs (20 mg) by Per J Tube route every morning                                * hydrocortisone 2 mg/mL Susp   Commonly known as:  CORTEF   Take 5 mLs (10 mg) by mouth every evening                                levothyroxine 25 mcg/mL Susp   Commonly known as:  SYNTHROID   6 mLs (150 mcg) by Per J Tube route every morning (before breakfast)                                melatonin 1 MG/ML Liqd liquid   6 mLs (6 mg) by Per J Tube route every evening                                miconazole 2 % powder   Commonly known as:  MICATIN; MICRO GUARD   Apply topically every hour as needed for other (topical candidiasis)                                 multivitamins with minerals Liqd liquid   15 mLs by Per J Tube route daily                                pantoprazole Susp suspension   Commonly known as:  PROTONIX   Take 20 mg by mouth 2 times daily                                polyethylene glycol Packet   Commonly known as:  MIRALAX/GLYCOLAX   Take 17 g by mouth 2 times daily as needed (constipation)                                potassium & sodium phosphates 280-160-250 MG Packet   Commonly known as:  NEUTRA-PHOS   Take 1 packet by mouth 3 times daily 0900, 1500, 2100.                                testosterone cypionate 200 MG/ML injection   Commonly known as:  DEPOTESTOTERONE   Inject 200 mg into the muscle every 14 days Mondays                                VITAMIN D (CHOLECALCIFEROL) PO   Take 2,000 Units by mouth daily                                * Notice:  This list has 2 medication(s) that are the same as other medications prescribed for you. Read the directions carefully, and ask your doctor or other care provider to review them with you.

## 2017-08-21 NOTE — DISCHARGE INSTRUCTIONS
G-tube Exchange Discharge Instructions     After you go home:      You may resume your normal diet    Care of Insertion Site:      For the first 48 hrs, check your puncture site every couple hours while you are awake     You may remove/change the dressing tomorrow    You may shower tomorrow    No tub baths, whirlpools or swimming until your puncture site has fully healed     Activity       You may go back to normal activity in 24 hours    Wait 48 hours before lifting, straining, exercise or other strenuous activity    Medicines:      You may resume all medications    Resume your Warfarin/Coumadin at your regular dose today. Follow up with your provider to have your INR rechecked    Resume your Platelet Inhibitors and Aspirin tomorrow at your regular dose    For minor pain, you may take Acetaminophen (Tylenol) or Ibuprofen (Advil)                 Call the provider who ordered this procedure if:      Blood or fluid is draining from the site    The site is red, swollen, hot, tender or there is foul-smelling drainage    Chills or a fever greater than 101 F (38 C)    Increased pain at the site    Any questions or concerns    Call  911 or go to the Emergency Room if:      Severe pain or trouble breathing    Bleeding that you cannot control      If you have questions call:          [facility] Radiology Dept @ [phone]        The provider who performed your procedure was _________________.        Caring for your G-tube    Tube Maintenance:    Possible problems with your tube may include:      Clogged with medications or feedings - most obstructions can be cleared with a small (3cc) syringe and warm water. This may be repeated until the tube is unclogged. This can be prevented by frequently flushing the tube with water (60cc) during the day and always after medications & feedings.      Tube pulls out or falls out -cover the opening with gauze & tape. Call [phone] for further instructions      Skin breakdown  and/or yeast infections at the insertion site - use of skin barrier ointments and anti-fungal powders can treat most site irritations.  Ask your pharmacist or provider for assistance (a prescription is not necessary).    In general, tube problems (including pulled tubes) are NOT emergency situations. Unless the pulling out of a tube is accompanied by uncontrollable bleeding, please DO NOT GO TO THE EMERGENCY ROOM!  Call [phone] with problems.    Tube Care:      Change the gauze dressing every 24 hours and if soiled (dirty).  Stabilize all tubes securely by using gauze and tape.  Clean tube site with soap & water using a cotton applicator (Q-tip) as needed to prevent irritation.    Flush feeding tube with 60cc of warm or room temperature water before and after meds.  To prevent the tube from clogging, ask your provider or pharmacist if liquid forms of your medications are available. If not, crush the pills well & be sure to flush the tube before & after all medications.    Flush feeding tube a minimum of every 4 hours and when feeding is completed with 60cc of water to keep the tube clear and avoid clogging.    Pt may use an abdominal (waist) binder to protect the G-tube.    If there is continued oozing or bleeding, redness, yellow/green/foul smelling drainage    STOP the feedings & use of the tube immediately if there is:      Continued oozing or bleeding at the site    Redness    Yellow/green or foul smelling drainage at the site    Uncontrolled stomach pain    Many of the supplies mentioned above can be purchased at your local pharmacy      For issues with your tube, please call:    [facility] at [phone]

## 2017-08-21 NOTE — IR NOTE
Interventional Radiology Intra-procedural Nursing Note     Patient Name:  Keyon Farias    Medical Record Number: [id number]  Today's Date:  August 21, 2017  Start Time: 1335  End of procedure time: 1340  Procedure: G-J tube exchange  Report given to: [facility] Jeniffer CHRISTINE  Time pt departs:  1345       Notes:    Patient brought  into IR room# 2 at 1325.      Informed consent obtained by VI Stout MD.      Peripheral pulses checked and documented in Epic Flowsheet.     Patient prepped and draped in supine position with 2% Chlorhexidine.      Debrief was completed by VI Stout MD.       Patient received 0 mg Versed and 0 mcg fentanyl for sedation during procedure.       The patient tolerated the procedure well.         Wilman Dawson RN

## 2017-08-21 NOTE — PROGRESS NOTES
Here for G-tube change. Allergies noted. NPO for 2 days, but has had meds. Reviewed d/c instructions with pt's mom, no questions.

## 2017-08-30 NOTE — PROGRESS NOTES
[facility]      OUTPATIENT SPEECH LANGUAGE PATHOLOGY EVALUATION  PLAN OF TREATMENT FOR OUTPATIENT REHABILITATION  (COMPLETE FOR INITIAL CLAIMS ONLY)  Patient's Last Name, First Name, M.I.  YOB: 1960  Keyon Farias                        Provider's Name  [facility] Medical Record No.  [id number]                               Onset Date:  06/05/17       Start of Care Date: 06/06/17         Type:     ___PT   ___OT   _X_SLP Medical Diagnosis: Aspiration Pnemonia                     SLP Diagnosis:   Dysphagia        Visits from SOC:  1   ________________________________________________________________  Plan of Treatment/Functional Goals    Planned Interventions:   Dysphagia Treatment Modified diet education          follow up with care team and mother but not appropirate for ongoing speech therapy d/t poor prognosis                                                     Goals: See Speech Language Pathology Goals on Care Plan in [facility] electronic health record.    Therapy Frequency:other (see comments) (follow up with care team and mother x1)        Predicted Duration of Therapy Intervention:           ________________________________________________________________________________    I CERTIFY THE NEED FOR THESE SERVICES FURNISHED UNDER        THIS PLAN OF TREATMENT AND WHILE UNDER MY CARE     (Physician co-signature of this document indicates review and certification of the therapy plan).                  Certification date from: 06/06/17 Certification date to: 06/07/17                   Referring Physician: Kalia                     Initial Assessment        See Speech Language Pathology documentation in Epic electronic health record, evaluation dated  06/06/17

## 2017-10-03 ENCOUNTER — HOSPITAL ENCOUNTER (INPATIENT)
Facility: CLINIC | Age: 57
LOS: 3 days | Discharge: HOME HEALTH | DRG: 871 | End: 2017-10-07
Attending: EMERGENCY MEDICINE | Admitting: HOSPITALIST
Payer: MEDICARE

## 2017-10-03 ENCOUNTER — APPOINTMENT (OUTPATIENT)
Dept: GENERAL RADIOLOGY | Facility: CLINIC | Age: 57
DRG: 871 | End: 2017-10-03
Attending: EMERGENCY MEDICINE
Payer: MEDICARE

## 2017-10-03 ENCOUNTER — APPOINTMENT (OUTPATIENT)
Dept: CT IMAGING | Facility: CLINIC | Age: 57
DRG: 871 | End: 2017-10-03
Attending: EMERGENCY MEDICINE
Payer: MEDICARE

## 2017-10-03 DIAGNOSIS — G40.919 BREAKTHROUGH SEIZURE (H): ICD-10-CM

## 2017-10-03 DIAGNOSIS — K85.91 NECROTIZING PANCREATITIS: ICD-10-CM

## 2017-10-03 DIAGNOSIS — J18.9 PNEUMONIA OF BOTH LOWER LOBES DUE TO INFECTIOUS ORGANISM: ICD-10-CM

## 2017-10-03 DIAGNOSIS — R65.21 SEPTIC SHOCK (H): ICD-10-CM

## 2017-10-03 DIAGNOSIS — R56.9 SEIZURE (H): ICD-10-CM

## 2017-10-03 DIAGNOSIS — R56.9 SEIZURES (H): ICD-10-CM

## 2017-10-03 DIAGNOSIS — E87.20 ACIDOSIS: ICD-10-CM

## 2017-10-03 DIAGNOSIS — E23.0 PANHYPOPITUITARISM (H): Primary | ICD-10-CM

## 2017-10-03 DIAGNOSIS — J96.01 ACUTE RESPIRATORY FAILURE WITH HYPOXIA (H): ICD-10-CM

## 2017-10-03 DIAGNOSIS — J96.01 ACUTE RESPIRATORY FAILURE WITH HYPOXIA AND HYPERCAPNIA (H): ICD-10-CM

## 2017-10-03 DIAGNOSIS — E87.1 HYPONATREMIA: ICD-10-CM

## 2017-10-03 DIAGNOSIS — G93.40 ENCEPHALOPATHY: ICD-10-CM

## 2017-10-03 DIAGNOSIS — A41.9 SEPTIC SHOCK (H): ICD-10-CM

## 2017-10-03 DIAGNOSIS — G40.803 INTRACTABLE EPILEPSY DUE TO EXTERNAL CAUSES WITH STATUS EPILEPTICUS (H): ICD-10-CM

## 2017-10-03 DIAGNOSIS — K94.23 PEG TUBE MALFUNCTION (H): ICD-10-CM

## 2017-10-03 DIAGNOSIS — J96.02 ACUTE RESPIRATORY FAILURE WITH HYPOXIA AND HYPERCAPNIA (H): ICD-10-CM

## 2017-10-03 DIAGNOSIS — J18.9 PNEUMONIA OF RIGHT LOWER LOBE DUE TO INFECTIOUS ORGANISM: ICD-10-CM

## 2017-10-03 DIAGNOSIS — I46.9 CARDIAC ARREST (H): ICD-10-CM

## 2017-10-03 DIAGNOSIS — G40.909 RECURRENT SEIZURES (H): ICD-10-CM

## 2017-10-03 LAB
ALBUMIN SERPL-MCNC: 3.8 G/DL (ref 3.4–5)
ALBUMIN UR-MCNC: 30 MG/DL
ALP SERPL-CCNC: 91 U/L (ref 40–150)
ALT SERPL W P-5'-P-CCNC: 36 U/L (ref 0–70)
AMORPH CRY #/AREA URNS HPF: ABNORMAL /HPF
ANION GAP SERPL CALCULATED.3IONS-SCNC: 14 MMOL/L (ref 3–14)
APPEARANCE UR: CLEAR
AST SERPL W P-5'-P-CCNC: 43 U/L (ref 0–45)
BASE DEFICIT BLDA-SCNC: 0.9 MMOL/L
BASOPHILS # BLD AUTO: 0 10E9/L (ref 0–0.2)
BASOPHILS NFR BLD AUTO: 0 %
BILIRUB SERPL-MCNC: 0.4 MG/DL (ref 0.2–1.3)
BILIRUB UR QL STRIP: NEGATIVE
BUN SERPL-MCNC: 16 MG/DL (ref 7–30)
CALCIUM SERPL-MCNC: 9.7 MG/DL (ref 8.5–10.1)
CARBAMAZEPINE SERPL-MCNC: 13.4 MG/L (ref 4–12)
CHLORIDE SERPL-SCNC: 95 MMOL/L (ref 94–109)
CO2 BLDCOV-SCNC: 28 MMOL/L (ref 21–28)
CO2 SERPL-SCNC: 23 MMOL/L (ref 20–32)
COLOR UR AUTO: YELLOW
CREAT SERPL-MCNC: 0.95 MG/DL (ref 0.66–1.25)
DIFFERENTIAL METHOD BLD: ABNORMAL
EOSINOPHIL # BLD AUTO: 0.3 10E9/L (ref 0–0.7)
EOSINOPHIL NFR BLD AUTO: 2 %
ERYTHROCYTE [DISTWIDTH] IN BLOOD BY AUTOMATED COUNT: 17.1 % (ref 10–15)
GFR SERPL CREATININE-BSD FRML MDRD: 82 ML/MIN/1.7M2
GLUCOSE SERPL-MCNC: 70 MG/DL (ref 70–99)
GLUCOSE UR STRIP-MCNC: NEGATIVE MG/DL
HCO3 BLD-SCNC: 29 MMOL/L (ref 21–28)
HCT VFR BLD AUTO: 49.2 % (ref 40–53)
HGB BLD-MCNC: 16.9 G/DL (ref 13.3–17.7)
HGB UR QL STRIP: NEGATIVE
INTERPRETATION ECG - MUSE: NORMAL
KETONES UR STRIP-MCNC: NEGATIVE MG/DL
LACTATE BLD-SCNC: 10.1 MMOL/L (ref 0.7–2)
LEUKOCYTE ESTERASE UR QL STRIP: NEGATIVE
LIPASE SERPL-CCNC: 299 U/L (ref 73–393)
LYMPHOCYTES # BLD AUTO: 6 10E9/L (ref 0.8–5.3)
LYMPHOCYTES NFR BLD AUTO: 35 %
MCH RBC QN AUTO: 29 PG (ref 26.5–33)
MCHC RBC AUTO-ENTMCNC: 34.3 G/DL (ref 31.5–36.5)
MCV RBC AUTO: 85 FL (ref 78–100)
MONOCYTES # BLD AUTO: 0.9 10E9/L (ref 0–1.3)
MONOCYTES NFR BLD AUTO: 5 %
NEUTROPHILS # BLD AUTO: 9.9 10E9/L (ref 1.6–8.3)
NEUTROPHILS NFR BLD AUTO: 58 %
NITRATE UR QL: NEGATIVE
NON-SQ EPI CELLS #/AREA URNS LPF: ABNORMAL /LPF
OXYHGB MFR BLD: 26 % (ref 92–100)
PCO2 BLD: 67 MM HG (ref 35–45)
PCO2 BLDV: 80 MM HG (ref 40–50)
PH BLD: 7.24 PH (ref 7.35–7.45)
PH BLDV: 7.15 PH (ref 7.32–7.43)
PH UR STRIP: 7.5 PH (ref 5–7)
PLATELET # BLD AUTO: 175 10E9/L (ref 150–450)
PLATELET # BLD EST: ABNORMAL 10*3/UL
PO2 BLD: 22 MM HG (ref 80–105)
PO2 BLDV: 19 MM HG (ref 25–47)
POTASSIUM SERPL-SCNC: 5.2 MMOL/L (ref 3.4–5.3)
PROT SERPL-MCNC: 9.2 G/DL (ref 6.8–8.8)
RBC # BLD AUTO: 5.82 10E12/L (ref 4.4–5.9)
RBC #/AREA URNS AUTO: ABNORMAL /HPF
RBC MORPH BLD: NORMAL
SAO2 % BLDV FROM PO2: 17 %
SODIUM SERPL-SCNC: 132 MMOL/L (ref 133–144)
SOURCE: ABNORMAL
SP GR UR STRIP: 1.01 (ref 1–1.03)
UROBILINOGEN UR STRIP-ACNC: 0.2 EU/DL (ref 0.2–1)
WBC # BLD AUTO: 17 10E9/L (ref 4–11)
WBC #/AREA URNS AUTO: ABNORMAL /HPF

## 2017-10-03 PROCEDURE — 71260 CT THORAX DX C+: CPT

## 2017-10-03 PROCEDURE — 80053 COMPREHEN METABOLIC PANEL: CPT | Performed by: EMERGENCY MEDICINE

## 2017-10-03 PROCEDURE — 82803 BLOOD GASES ANY COMBINATION: CPT

## 2017-10-03 PROCEDURE — 74177 CT ABD & PELVIS W/CONTRAST: CPT

## 2017-10-03 PROCEDURE — 25000132 ZZH RX MED GY IP 250 OP 250 PS 637: Mod: GY | Performed by: EMERGENCY MEDICINE

## 2017-10-03 PROCEDURE — A9270 NON-COVERED ITEM OR SERVICE: HCPCS | Mod: GY | Performed by: EMERGENCY MEDICINE

## 2017-10-03 PROCEDURE — 71010 XR CHEST PORT 1 VW: CPT

## 2017-10-03 PROCEDURE — 40000281 ZZH STATISTIC TRANSPORT TIME EA 15 MIN

## 2017-10-03 PROCEDURE — 87040 BLOOD CULTURE FOR BACTERIA: CPT | Performed by: EMERGENCY MEDICINE

## 2017-10-03 PROCEDURE — 25000128 H RX IP 250 OP 636: Performed by: EMERGENCY MEDICINE

## 2017-10-03 PROCEDURE — 82803 BLOOD GASES ANY COMBINATION: CPT | Performed by: EMERGENCY MEDICINE

## 2017-10-03 PROCEDURE — 82810 BLOOD GASES O2 SAT ONLY: CPT | Performed by: EMERGENCY MEDICINE

## 2017-10-03 PROCEDURE — 94660 CPAP INITIATION&MGMT: CPT

## 2017-10-03 PROCEDURE — 83690 ASSAY OF LIPASE: CPT | Performed by: EMERGENCY MEDICINE

## 2017-10-03 PROCEDURE — 36600 WITHDRAWAL OF ARTERIAL BLOOD: CPT

## 2017-10-03 PROCEDURE — 85025 COMPLETE CBC W/AUTO DIFF WBC: CPT | Performed by: EMERGENCY MEDICINE

## 2017-10-03 PROCEDURE — 83605 ASSAY OF LACTIC ACID: CPT | Performed by: EMERGENCY MEDICINE

## 2017-10-03 PROCEDURE — 81001 URINALYSIS AUTO W/SCOPE: CPT | Performed by: EMERGENCY MEDICINE

## 2017-10-03 PROCEDURE — 40000275 ZZH STATISTIC RCP TIME EA 10 MIN

## 2017-10-03 PROCEDURE — 80156 ASSAY CARBAMAZEPINE TOTAL: CPT | Performed by: EMERGENCY MEDICINE

## 2017-10-03 PROCEDURE — 25000125 ZZHC RX 250: Performed by: EMERGENCY MEDICINE

## 2017-10-03 RX ORDER — CIPROFLOXACIN 2 MG/ML
400 INJECTION, SOLUTION INTRAVENOUS ONCE
Status: COMPLETED | OUTPATIENT
Start: 2017-10-03 | End: 2017-10-03

## 2017-10-03 RX ORDER — SODIUM CHLORIDE, SODIUM LACTATE, POTASSIUM CHLORIDE, CALCIUM CHLORIDE 600; 310; 30; 20 MG/100ML; MG/100ML; MG/100ML; MG/100ML
INJECTION, SOLUTION INTRAVENOUS CONTINUOUS
Status: DISCONTINUED | OUTPATIENT
Start: 2017-10-03 | End: 2017-10-04

## 2017-10-03 RX ORDER — LINEZOLID 2 MG/ML
600 INJECTION, SOLUTION INTRAVENOUS ONCE
Status: COMPLETED | OUTPATIENT
Start: 2017-10-03 | End: 2017-10-04

## 2017-10-03 RX ORDER — IOPAMIDOL 755 MG/ML
82 INJECTION, SOLUTION INTRAVASCULAR ONCE
Status: COMPLETED | OUTPATIENT
Start: 2017-10-03 | End: 2017-10-03

## 2017-10-03 RX ORDER — SODIUM CHLORIDE 9 MG/ML
1000 INJECTION, SOLUTION INTRAVENOUS CONTINUOUS
Status: DISCONTINUED | OUTPATIENT
Start: 2017-10-03 | End: 2017-10-03

## 2017-10-03 RX ORDER — ACETAMINOPHEN 650 MG/1
650 SUPPOSITORY RECTAL ONCE
Status: COMPLETED | OUTPATIENT
Start: 2017-10-03 | End: 2017-10-03

## 2017-10-03 RX ADMIN — SODIUM CHLORIDE, POTASSIUM CHLORIDE, SODIUM LACTATE AND CALCIUM CHLORIDE 1000 ML: 600; 310; 30; 20 INJECTION, SOLUTION INTRAVENOUS at 22:02

## 2017-10-03 RX ADMIN — AMIKACIN SULFATE 350 MG: 500 INJECTION, SOLUTION INTRAMUSCULAR; INTRAVENOUS at 23:44

## 2017-10-03 RX ADMIN — IOPAMIDOL 82 ML: 755 INJECTION, SOLUTION INTRAVENOUS at 23:04

## 2017-10-03 RX ADMIN — ACETAMINOPHEN 650 MG: 650 SUPPOSITORY RECTAL at 21:54

## 2017-10-03 RX ADMIN — SODIUM CHLORIDE 65 ML: 9 INJECTION, SOLUTION INTRAVENOUS at 23:04

## 2017-10-03 RX ADMIN — CIPROFLOXACIN 400 MG: 2 INJECTION, SOLUTION INTRAVENOUS at 22:31

## 2017-10-03 RX ADMIN — SODIUM CHLORIDE, POTASSIUM CHLORIDE, SODIUM LACTATE AND CALCIUM CHLORIDE 2217 ML: 600; 310; 30; 20 INJECTION, SOLUTION INTRAVENOUS at 22:20

## 2017-10-03 NOTE — IP AVS SNAPSHOT
MRN:[id number]                      After Visit Summary   10/3/2017    Keyon Farias    MRN: [id number]           Thank you!     Thank you for choosing [facility] for your care. Our goal is always to provide you with excellent care. Hearing back from our patients is one way we can continue to improve our services. Please take a few minutes to complete the written survey that you may receive in the mail after you visit with us. Thank you!        Patient Information     Date Of Birth          1960        Designated Caregiver       Most Recent Value    Caregiver    Will someone help with your care after discharge? yes    Name of designated caregiver Savannah    Phone number of caregiver [phone]    Caregiver address Home address      About your hospital stay     You were admitted on:  October 4, 2017 You last received care in the:  [place] [facility] [address] [facility]    You were discharged on:  October 7, 2017        Reason for your hospital stay       Rt sided PNA                  Who to Call     For medical emergencies, please call 911.  For non-urgent questions about your medical care, please call your primary care provider or clinic, [phone]          Attending Provider     Provider Specialty    Srikanth Ryan MD Emergency Medicine    Manish Meier MD Internal Medicine       Primary Care Provider Office Phone # Fax #    Shauna [facility] [phone]      After Care Instructions     Activity       Your activity upon discharge: activity as tolerated            Diet       Follow this diet upon discharge: Orders Placed This Encounter      Adult Formula Drip Feeding: Specified Time Isosource 1.5; Jejunostomy; Goal Rate: 90 mL/hr x 16 hrs (hold TF 1 hr before and 1 hr after Synthroid administration); mL/hr; From: 10:00 PM; 2:00 PM; Medication - Tube Feeding Flush Frequency: At least ...      NPO for Medical/Clinical Reasons Except for: No Exceptions                   "  Follow-up Appointments     Follow-up and recommended labs and tests       Follow up with primary care provider, Shauna [facility], within 7 days for hospital follow- up.  The following labs/tests are recommended: Carbamazepine level on 10/10/17.                  Additional Services     Home care nursing referral       Resume [facility]            Home care nursing referral       Resume Home Care    Your provider has ordered home care nursing services. If you have not been contacted within 2 days of your discharge please call the inpatient department phone number at [phone] .                  Further instructions from your care team       The following appointment has been scheduled for you:  Dr Gomez  Tuesday 10/10/17 at 2:45pm  [facility]  [phone]    [facility] phone: [phone] or [phone]    Pending Results     Date and Time Order Name Status Description    10/3/2017 2217 Blood culture Preliminary     10/3/2017 2150 Blood culture ONE site Preliminary             Statement of Approval     Ordered          10/07/17 1141  I have reviewed and agree with all the recommendations and orders detailed in this document.  EFFECTIVE NOW     Approved and electronically signed by:  Migel Stoddard MD             Admission Information     Date & Time Provider Department Dept. Phone    10/3/2017 Manish Meier MD [place] [address] [facility] [phone]      Your Vitals Were     Blood Pressure Pulse Temperature Respirations Weight Pulse Oximetry    133/58 (BP Location: Left arm) 61 98.5  F (36.9  C) (Oral) 18 77.3 kg (170 lb 6.7 oz) 96%    BMI (Body Mass Index)                   23.77 kg/m2           Simpler Information     [facility] lets you send messages to your doctor, view your test results, renew your prescriptions, schedule appointments and more. To sign up, go to [email or web address] . Click on \"Log in\" on the left side of the screen, which will take you to the " "Welcome page. Then click on \"Sign up Now\" on the right side of the page.     You will be asked to enter the access code listed below, as well as some personal information. Please follow the directions to create your username and password.     Your access code is: [email or web address]  Expires: 2017  1:10 PM     Your access code will  in 90 days. If you need help or a new code, please call your [facility] clinic or [phone].        Care EveryWhere ID     This is your Care EveryWhere ID. This could be used by other organizations to access your [facility] medical records  [id number]        Equal Access to Services     [facility] : Angely Rivas, krishna buenrostro, tobias sofia, yaw moreland . So [facility] [phone].    ATENCIÓN: Si habla español, tiene a king disposición servicios gratuitos de asistencia lingüística. Llame al [phone].    We comply with applicable federal civil rights laws and [place] laws. We do not discriminate on the basis of race, color, national origin, age, disability, sex, sexual orientation, or gender identity.               Review of your medicines      START taking        Dose / Directions    ciprofloxacin 500 MG tablet   Commonly known as:  CIPRO   Indication:  Healthcare-Associated Pneumonia   Used for:  Pneumonia of right lower lobe due to infectious organism (H)        Dose:  500 mg   1 tablet (500 mg) by Per Feeding Tube route every 12 hours   Quantity:  14 tablet   Refills:  0       minocycline 100 MG capsule   Commonly known as:  MINOCIN/DYNACIN   Indication:  Healthcare-Associated Pneumonia   Used for:  Pneumonia of right lower lobe due to infectious organism (H)        Dose:  100 mg   Take 1 capsule (100 mg) by mouth every 12 hours for 7 days   Quantity:  14 capsule   Refills:  0         CONTINUE these medicines which may have CHANGED, or have new prescriptions. If we are uncertain of the size of tablets/capsules you " have at home, strength may be listed as something that might have changed.        Dose / Directions    carBAMazepine 100 MG/5ML suspension   Commonly known as:  TEGretol   This may have changed:  when to take this   Used for:  Pneumonia of right lower lobe due to infectious organism (H)   Notes to Patient:  7 AM, 3 PM and 11 PM        Dose:  150 mg   Take 7.5 mLs (150 mg) by mouth every 8 hours   Quantity:  900 mL   Refills:  0       levothyroxine 25 mcg/mL Susp   Commonly known as:  SYNTHROID   This may have changed:  additional instructions   Used for:  Panhypopituitarism (H)        Dose:  150 mcg   6 mLs (150 mcg) by Per J Tube route every morning (before breakfast)   Refills:  0         CONTINUE these medicines which have NOT CHANGED        Dose / Directions    ACETAMINOPHEN PO        Dose:  650 mg   650 mg by Per Feeding Tube route every 4 hours as needed for pain   Refills:  0       aspirin 10 mg/mL Susp   Used for:  Routine adult health maintenance        Dose:  81 mg   8.1 mLs (81 mg) by Per J Tube route daily   Refills:  0       bacitracin ointment        Apply topically daily as needed for wound care To PEG site.   Refills:  0       bisacodyl 10 MG Suppository   Commonly known as:  DULCOLAX   Used for:  Constipation, unspecified constipation type        Dose:  10 mg   Place 1 suppository (10 mg) rectally daily as needed for constipation   Quantity:  30 suppository   Refills:  0       BRIVIACT 10 MG/ML solution   Generic drug:  Brivaracetam        Dose:  100 mg   Take 100 mg by mouth 2 times daily   Refills:  0       calcium carbonate 1250 (500 CA) MG/5ML Susp suspension   Used for:  Malnutrition (H)        Dose:  1250 mg   5 mLs (1,250 mg) by Per J Tube route 3 times daily (with meals)   Quantity:  450 mL   Refills:  0       fiber modular packet   Used for:  Necrotizing pancreatitis        Dose:  1 packet   1 packet by Per Feeding Tube route 3 times daily   Refills:  0       * hydrocortisone 2 mg/mL Susp    Commonly known as:  CORTEF   Used for:  Panhypopituitarism (H)        Dose:  20 mg   10 mLs (20 mg) by Per J Tube route every morning   Refills:  0       * hydrocortisone 2 mg/mL Susp   Commonly known as:  CORTEF   Used for:  Panhypopituitarism (H)        Dose:  10 mg   Take 5 mLs (10 mg) by mouth every evening   Refills:  0       melatonin 1 MG/ML Liqd liquid   Used for:  Insomnia, unspecified type        Dose:  6 mg   6 mLs (6 mg) by Per J Tube route every evening   Refills:  0       miconazole 2 % powder   Commonly known as:  MICATIN; MICRO GUARD   Used for:  Rash        Apply topically every hour as needed for other (topical candidiasis)   Refills:  0       multivitamins with minerals Liqd liquid   Used for:  Necrotizing pancreatitis, Malnutrition (H)        Dose:  15 mL   15 mLs by Per J Tube route daily   Refills:  0       pantoprazole Susp suspension   Commonly known as:  PROTONIX        Dose:  20 mg   Take 20 mg by mouth 2 times daily   Refills:  0       polyethylene glycol Packet   Commonly known as:  MIRALAX/GLYCOLAX   Used for:  Constipation, unspecified constipation type        Dose:  17 g   Take 17 g by mouth 2 times daily as needed (constipation)   Quantity:  7 packet   Refills:  0       potassium & sodium phosphates 280-160-250 MG Packet   Commonly known as:  NEUTRA-PHOS        Dose:  1 packet   Take 1 packet by mouth 2 times daily 0900, 1500, 2100.   Refills:  0       testosterone cypionate 200 MG/ML injection   Commonly known as:  DEPOTESTOTERONE        Dose:  100 mg   Inject 100 mg into the muscle once a week Fridays   Refills:  0       VITAMIN D (CHOLECALCIFEROL) PO        Dose:  2000 Units   Take 2,000 Units by mouth daily   Refills:  0       * Notice:  This list has 2 medication(s) that are the same as other medications prescribed for you. Read the directions carefully, and ask your doctor or other care provider to review them with you.         Where to get your medicines      These  medications were sent to [facility] [place], [place] - [address]  [address], [place] [address]     Phone:  [phone]     ciprofloxacin 500 MG tablet    minocycline 100 MG capsule         Some of these will need a paper prescription and others can be bought over the counter. Ask your nurse if you have questions.     You don't need a prescription for these medications     carBAMazepine 100 MG/5ML suspension               ANTIBIOTIC INSTRUCTION     You've Been Prescribed an Antibiotic - Now What?  Your healthcare team thinks that you or your loved one might have an infection. Some infections can be treated with antibiotics, which are powerful, life-saving drugs. Like all medications, antibiotics have side effects and should only be used when necessary. There are some important things you should know about your antibiotic treatment.      Your healthcare team may run tests before you start taking an antibiotic.    Your team may take samples (e.g., from your blood, urine or other areas) to run tests to look for bacteria. These test can be important to determine if you need an antibiotic at all and, if you do, which antibiotic will work best.      Within a few days, your healthcare team might change or even stop your antibiotic.    Your team may start you on an antibiotic while they are working to find out what is making you sick.    Your team might change your antibiotic because test results show that a different antibiotic would be better to treat your infection.    In some cases, once your team has more information, they learn that you do not need an antibiotic at all. They may find out that you don't have an infection, or that the antibiotic you're taking won't work against your infection. For example, an infection caused by a virus can't be treated with antibiotics. Staying on an antibiotic when you don't need it is more likely to be harmful than helpful.      You may experience  side effects from your antibiotic.    Like all medications, antibiotics have side effects. Some of these can be serious.    Let you healthcare team know if you have any known allergies when you are admitted to the hospital.    One significant side effect of nearly all antibiotics is the risk of severe and sometimes deadly diarrhea caused by Clostridium difficile (C. Difficile). This occurs when a person takes antibiotics because some good germs are destroyed. Antibiotic use allows C. diificile to take over, putting patients at high risk for this serious infection.    As a patient or caregiver, it is important to understand your or your loved one's antibiotic treatment. It is especially important for caregivers to speak up when patients can't speak for themselves. Here are some important questions to ask your healthcare team.    What infection is this antibiotic treating and how do you know I have that infection?    What side effects might occur from this antibiotic?    How long will I need to take this antibiotic?    Is it safe to take this antibiotic with other medications or supplements (e.g., vitamins) that I am taking?     Are there any special directions I need to know about taking this antibiotic? For example, should I take it with food?    How will I be monitored to know whether my infection is responding to the antibiotic?    What tests may help to make sure the right antibiotic is prescribed for me?      Information provided by:  www.cdc.gov/getsmart  U.S. Department of Health and Human Services  Centers for disease Control and Prevention  National Center for Emerging and Zoonotic Infectious Diseases  Division of Healthcare Quality Promotion         Protect others around you: Learn how to safely use, store and throw away your medicines at www.disposemymeds.org.             Medication List: This is a list of all your medications and when to take them. Check marks below indicate your daily home schedule. Keep  this list as a reference.      Medications           Morning Afternoon Evening Bedtime As Needed    ACETAMINOPHEN PO   650 mg by Per Feeding Tube route every 4 hours as needed for pain                                   aspirin 10 mg/mL Susp   8.1 mLs (81 mg) by Per J Tube route daily                                   bacitracin ointment   Apply topically daily as needed for wound care To PEG site.                                   bisacodyl 10 MG Suppository   Commonly known as:  DULCOLAX   Place 1 suppository (10 mg) rectally daily as needed for constipation                                   BRIVIACT 10 MG/ML solution   Take 100 mg by mouth 2 times daily   Generic drug:  Brivaracetam                                      calcium carbonate 1250 (500 CA) MG/5ML Susp suspension   5 mLs (1,250 mg) by Per J Tube route 3 times daily (with meals)                                         carBAMazepine 100 MG/5ML suspension   Commonly known as:  TEGretol   Take 7.5 mLs (150 mg) by mouth every 8 hours   Last time this was given:  150 mg on 10/7/2017 10:00 AM   Notes to Patient:  7 AM, 3 PM and 11 PM                                      ciprofloxacin 500 MG tablet   Commonly known as:  CIPRO   1 tablet (500 mg) by Per Feeding Tube route every 12 hours   Last time this was given:  500 mg on 10/7/2017 10:00 AM                                      fiber modular packet   1 packet by Per Feeding Tube route 3 times daily   Last time this was given:  1 packet on 10/7/2017 10:08 AM                                      * hydrocortisone 2 mg/mL Susp   Commonly known as:  CORTEF   10 mLs (20 mg) by Per J Tube route every morning                                   * hydrocortisone 2 mg/mL Susp   Commonly known as:  CORTEF   Take 5 mLs (10 mg) by mouth every evening                                   levothyroxine 25 mcg/mL Susp   Commonly known as:  SYNTHROID   6 mLs (150 mcg) by Per J Tube route every morning (before breakfast)                                    melatonin 1 MG/ML Liqd liquid   6 mLs (6 mg) by Per J Tube route every evening                                   miconazole 2 % powder   Commonly known as:  MICATIN; MICRO GUARD   Apply topically every hour as needed for other (topical candidiasis)                                   minocycline 100 MG capsule   Commonly known as:  MINOCIN/DYNACIN   Take 1 capsule (100 mg) by mouth every 12 hours for 7 days   Last time this was given:  100 mg on 10/7/2017 10:01 AM                                      multivitamins with minerals Liqd liquid   15 mLs by Per J Tube route daily   Last time this was given:  15 mLs on 10/7/2017 10:00 AM                                   pantoprazole Susp suspension   Commonly known as:  PROTONIX   Take 20 mg by mouth 2 times daily   Last time this was given:  40 mg on 10/7/2017 10:00 AM                                      polyethylene glycol Packet   Commonly known as:  MIRALAX/GLYCOLAX   Take 17 g by mouth 2 times daily as needed (constipation)                                   potassium & sodium phosphates 280-160-250 MG Packet   Commonly known as:  NEUTRA-PHOS   Take 1 packet by mouth 2 times daily 0900, 1500, 2100.   Last time this was given:  1 packet on 10/7/2017 10:01 AM                                      testosterone cypionate 200 MG/ML injection   Commonly known as:  DEPOTESTOTERONE   Inject 100 mg into the muscle once a week Fridays                                VITAMIN D (CHOLECALCIFEROL) PO   Take 2,000 Units by mouth daily   Last time this was given:  2,000 Units on 10/7/2017 10:01 AM                                   * Notice:  This list has 2 medication(s) that are the same as other medications prescribed for you. Read the directions carefully, and ask your doctor or other care provider to review them with you.

## 2017-10-03 NOTE — IP AVS SNAPSHOT
"` `           [place] [address] [facility]: [phone]                                              INTERAGENCY TRANSFER FORM - NURSING   10/3/2017                    Hospital Admission Date: 10/3/2017  BERT BARAJAS   : 1960  Sex: Male        Attending Provider: Manish Meier MD     Allergies:  Dilantin [Phenytoin Sodium], Valproic Acid    Infection:  MRSA, VRE   Service:  HOSPITALIST    Ht:  1.803 m (5' 11\")   Wt:  77.3 kg (170 lb 6.7 oz)   Admission Wt:  76.5 kg (168 lb 10.4 oz)    BMI:  23.77 kg/m 2   BSA:  1.97 m 2            Patient PCP Information     Provider PCP Type    [facility]      Current Code Status     Date Active Code Status Order ID Comments User Context       Prior      Code Status History     Date Active Date Inactive Code Status Order ID Comments User Context    10/7/2017 10:09 AM  Full Code [id number]  Migel Stoddard MD Outpatient    10/4/2017  1:08 AM 10/7/2017 10:09 AM Full Code [id number]  Manish Meier MD Inpatient    2017 10:05 AM 10/4/2017  1:08 AM Full Code [id number]  Hayden Epperson DO Outpatient    2017  1:07 AM 2017 10:05 AM Full Code [id number]  Manish Motta MD Inpatient    2017  6:04 PM 2/10/2017  3:21 PM Full Code [id number]  Yoel Montgomery MD Inpatient    2017 12:35 PM 2017  6:04 PM Full Code [id number]  Shaneka Clark PA-C Outpatient    2017  6:33 PM 2017 12:35 PM Full Code [id number]  Marquez Green MD Inpatient    2016 10:25 AM 2017  6:33 PM Full Code [id number]  Elen Carrillo MD Outpatient    2016 11:30 AM 2016 10:25 AM Full Code [id number]  Alicia Roca APRN CNP Outpatient    2016  6:52 PM 2016 11:30 AM Full Code [id number]  Shea Wei MD Inpatient    10/31/2016  1:24 PM 2016  6:52 PM Full Code [id number]  Bindu Barillas PA-C Outpatient    10/31/2016  4:53 AM 10/31/2016  1:24 PM Full Code [id number]  " Regino Valenzuela MD ED    9/9/2016  7:48 AM 10/31/2016  4:53 AM Full Code [id number]  Shea Xiong MD Outpatient    8/23/2016  9:05 PM 9/9/2016  7:48 AM Full Code [id number]  Adonay Fernandez MD Inpatient    7/24/2016 12:45 PM 7/25/2016  7:34 PM Full Code [id number]  Jossie Higgins MD Inpatient    1/6/2016  2:05 PM 7/24/2016 12:45 PM Full Code [id number]  Romero Zamarripa MD Outpatient    1/5/2016 12:18 AM 1/6/2016  2:05 PM Full Code [id number]  Israel Christianson MD Inpatient    11/18/2015 11:13 AM 1/5/2016 12:18 AM Full Code [id number]  Starr Champion MD Outpatient    11/15/2015 11:37 AM 11/18/2015 11:13 AM Full Code [id number]  Abhi Cruz MD Inpatient    3/9/2015 10:35 AM 11/15/2015 11:37 AM Full Code [id number]  Danyelle Pelayo MD Outpatient    7/5/2014 11:32 AM 3/9/2015 10:35 AM Full Code [id number]  Shea Phillips MD Outpatient    7/4/2014 11:49 AM 7/5/2014 11:32 AM Full Code [id number]  Starr Champion MD ED    3/14/2014  2:01 PM 7/4/2014 11:49 AM Full Code [id number]  Starr Champion MD Outpatient    3/13/2014  6:06 PM 3/14/2014  2:01 PM Full Code [id number]  Carrie Hanson RN Inpatient    7/30/2013  9:12 PM 8/1/2013  4:35 PM Full Code [id number]  Sharon Zeng RN Inpatient      Advance Directives        Does patient have a scanned Advance Directive/ACP document in EPIC?           No        Hospital Problems as of 10/7/2017              Priority Class Noted POA    Sepsis (H) Medium  11/15/2015 Yes      Non-Hospital Problems as of 10/7/2017              Priority Class Noted    Appendicitis Medium  7/30/2013    Panhypopituitarism (H) Medium  Unknown    Hematemesis Medium  3/13/2014    Seizure (H) Medium  7/4/2014    Seizures (H) Medium  10/4/2014    Recurrent seizures (H) Medium  3/8/2015    Septic shock (H) Medium  1/6/2016    Pneumonia of both lower lobes due to infectious organism Medium  1/6/2016    Community acquired pneumonia Medium   1/6/2016    Breakthrough seizure (H) Medium  7/24/2016    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  8/23/2016    Hyponatremia Medium  9/9/2016    PEG tube malfunction (H) Medium  10/31/2016    Pneumonia Medium  11/23/2016    Cardiac arrest (H) Medium  11/26/2016    Acute respiratory failure with hypoxia (H) Medium  11/26/2016    Necrotizing pancreatitis Medium  1/23/2017    Pneumonia, aspiration (H) Medium  6/5/2017    Encephalopathy Medium  6/6/2017      Immunizations     Name Date      Influenza (IIV3) 01/23/13     Influenza Vaccine IM 3yrs+ 4 Valent IIV4 10/05/17     Influenza Vaccine IM 3yrs+ 4 Valent IIV4 12/09/16          END      ASSESSMENT     Discharge Profile Flowsheet     EXPECTED DISCHARGE     Referrals Placed  Home Care;Transportation;Other *** (home medical) 06/08/17 1203    Expected Discharge Date  10/07/17 (Home pending Tegretol level) 10/06/17 1033   SKIN      DISCHARGE NEEDS ASSESSMENT     Inspection of bony prominences  Full 10/07/17 1044    # of Referrals Placed by CTS  Homecare;Transportation 06/08/17 1203   Skin WDL  ex 10/07/17 1044    Does Patient Need a Referral for Clinic CC  Yes (No CC at this clinic) 01/06/16 1503   Skin Color/Characteristics  bruised (ecchymotic) 10/07/17 1044    GASTROINTESTINAL (ADULT,PEDIATRIC,OB)     Skin Temperature  warm 10/07/17 1044    GI WDL  WDL 10/07/17 1044   Skin Moisture  dry 10/07/17 1044    Last Bowel Movement  10/07/17 10/07/17 0756   Skin Elasticity  quick return to original state 10/07/17 1044    Passing flatus  yes 10/07/17 1044   Skin Integrity  wound(s) 10/07/17 1044    COMMUNICATION ASSESSMENT     SAFETY      Patient's communication style  spoken language (English or Bilingual) 10/04/17 0116   Safety WDL  WDL 10/07/17 1044    FINAL RESOURCES     All Alarms  alarm(s) activated and audible 10/07/17 1044    Resources List  Transitional Care 10/31/16 1236                      Assessment WDL (Within Defined Limits)  "Definitions           Safety WDL     Effective: 09/28/15    Row Information: <b>WDL Definition:</b> Bed in low position, wheels locked; call light in reach; upper side rails up x 2; ID band on<br> <font color=\"gray\"><i>Item=AS safety wdl>>List=AS safety wdl>>Version=F14</i></font>      Skin WDL     Effective: 09/28/15    Row Information: <b>WDL Definition:</b> Warm; dry; intact; elastic; without discoloration; pressure points without redness<br> <font color=\"gray\"><i>Item=AS skin wdl>>List=AS skin wdl>>Version=F14</i></font>      Vitals     Vital Signs Flowsheet     VITAL SIGNS     Compliance w/ventilator (intubated patients)  Extubated 10/04/17 1553    Temp  98.5  F (36.9  C) 10/07/17 0804   Vocalization (extubated patients)  0 10/04/17 1553    Temp src  Oral 10/07/17 0804   Muscle Tension  0 10/04/17 1553    Resp  18 10/07/17 0804   Total  0 10/04/17 1553    Pulse  61 10/06/17 2357   HEIGHT AND WEIGHT      Heart Rate  74 10/07/17 0804   Weight  77.3 kg (170 lb 6.7 oz) 10/07/17 0601    Pulse/Heart Rate Source  Monitor 10/07/17 0804   POSITIONING      BP  133/58 10/07/17 0804   Body Position  side-lying, right 10/07/17 1243    BP Location  Left arm 10/07/17 0804   Head of Bed (HOB)  HOB at 30 degrees 10/07/17 1243    OXYGEN THERAPY     Positioning/Transfer Devices  pillows;in use 10/07/17 1243    SpO2  96 % 10/07/17 0804   ECG      O2 Device  None (Room air) 10/07/17 0804   ECG Rhythm  Sinus rhythm 10/04/17 1553    FiO2 (%)  50 % 10/04/17 0318   Ectopy  None 10/04/17 1553    Oxygen Delivery  2 LPM 10/04/17 0858   Lead Monitored  Lead II;V 1 10/04/17 1553    PAIN/COMFORT     IVANA COMA SCALE      Patient Currently in Pain  denies 10/07/17 1006   Best Eye Response  3-->(E3) to speech 10/04/17 1553    Preferred Pain Scale  CPOT (Critical-Care Pain Observation Tool) 10/04/17 1553   Best Motor Response  6-->(M6) obeys commands 10/04/17 1553    CRITICAL-CARE PAIN OBSERVATION TOOL (CPOT)     DAILY CARE      Facial " Expression  0 10/04/17 1553   Activity Management  activity adjusted per tolerance;bedrest 10/07/17 1243    Body Movements  0 10/04/17 1553   Activity Assistance Provided  assistance, 2 people 10/07/17 1243            Patient Lines/Drains/Airways Status    Active LINES/DRAINS/AIRWAYS     Name: Placement date: Placement time: Site: Days: Last dressing change:    Gastrostomy/Enterostomy Gastrojejunostomy LUQ 1 18 fr Festus (TORY gastric-jejunal feeding tube) Lot #: [id number] - Expiration: 2018-12-01 08/21/17   1340   LUQ   47     Rectal Tube With balloon 02/04/17   0900      245     Urethral Catheter Non-latex 16 fr 10/03/17   2220   Non-latex   3     Peripheral IV 10/03/17 Left 10/03/17   2202      3     Pressure Injury 01/21/17 Posterior Coccyx reddened 01/21/17       259     Wound 01/23/17 Posterior Coccyx non blanchable area as well as blanchable area  01/23/17   1700   Coccyx   256     Wound 01/25/17 Left;Upper Arm Other (comment) Thick scab 01/25/17   1558   Arm   254             Patient Lines/Drains/Airways Status    Active PICC/CVC     None            Intake/Output Detail Report     Date Intake           Output   Net    Shift P.O. I.V. Other NG/GT IV Piggyback Enteral Total Urine Emesis/NG output Total       Noc 10/05/17 2300 - 10/06/17 0659 -- 350 -- 150 -- 611 1111 850 -- 850 261    Day 10/06/17 0700 - 10/06/17 1459 0 -- -- 270 -- -- 270 750 -- 750 -480    Alejandra 10/06/17 1500 - 10/06/17 2259 -- 750 -- 250 -- -- 1000 400 -- 400 600    Noc 10/06/17 2300 - 10/07/17 0659 -- -- -- 887 -- -- 887 1850 -- 1850 -963    Day 10/07/17 0700 - 10/07/17 1459 -- -- -- 475 --  -- 1550 -850      Last Void/BM       Most Recent Value    Urine Occurrence     Stool Occurrence 2 at 10/05/2017 1400      Case Management/Discharge Planning     Case Management/Discharge Planning Flowsheet     REFERRAL INFORMATION     DISCHARGE PLANNING      Arrived From  home or self-care 06/07/17 1039   Does Patient Need a Referral for  Clinic CC  Yes (No CC at this clinic) 01/06/16 1503    # of Referrals Placed by CTS  Homecare;Transportation 06/08/17 1203   FINAL NOTE      CTS Assigned to Case  -- (Gary Carty) 10/07/17 1249   Final Note  -- (Home with PCA and Home RN) 10/07/17 1249    Primary Care Clinic Name  [facility] 06/07/17 1039   FINAL RESOURCES      Primary Care MD Name  New: Dr Julee Roberson 01/06/16 1503   Resources List  Transitional Care 10/31/16 1236    LIVING ENVIRONMENT     Referrals Placed  Home Care;Transportation;Other *** (home medical) 06/08/17 1203    Lives With  parent(s) 10/06/17 1504   ABUSE RISK SCREEN      Living Arrangements  house 10/06/17 1504   QUESTION TO PATIENT:  Has a member of your family or a partner(now or in the past) intimidated, hurt, manipulated, or controlled you in any way?  patient declined to answer or is unable to answer 10/04/17 0121    COPING/STRESS     QUESTION TO PATIENT: Do you feel safe going back to the place where you are living?  patient declined to answer or is unable to answer 10/04/17 0121    Major Change/Loss/Stressor  hospitalization 10/05/17 1539   OBSERVATION: Is there reason to believe there has been maltreatment of a vulnerable adult (ie. Physical/Sexual/Emotional abuse, self neglect, lack of adequate food, shelter, medical care, or financial exploitation)?  no 10/04/17 0121    EXPECTED DISCHARGE     (R) MENTAL HEALTH SUICIDE RISK      Expected Discharge Date  10/07/17 (Home pending Tegretol level) 10/06/17 1033   Are you depressed or being treated for depression?  No 10/05/17 1538

## 2017-10-03 NOTE — IP AVS SNAPSHOT
[place] [address] [facility]    [address] [place] [address]    Phone:  [phone]                                       After Visit Summary   10/3/2017    Keyon Farias    MRN: [id number]           After Visit Summary Signature Page     I have received my discharge instructions, and my questions have been answered. I have discussed any challenges I see with this plan with the nurse or doctor.    ..........................................................................................................................................  Patient/Patient Representative Signature      ..........................................................................................................................................  Patient Representative Print Name and Relationship to Patient    ..................................................               ................................................  Date                                            Time    ..........................................................................................................................................  Reviewed by Signature/Title    ...................................................              ..............................................  Date                                                            Time

## 2017-10-03 NOTE — IP AVS SNAPSHOT
"    [place] [address] [facility]: [phone]                                              INTERAGENCY TRANSFER FORM - PHYSICIAN ORDERS   10/3/2017                    Hospital Admission Date: 10/3/2017  BERT BARAJAS   : 1960  Sex: Male        Attending Provider: Manish Meier MD     Allergies:  Dilantin [Phenytoin Sodium], Valproic Acid    Infection:  MRSA, VRE   Service:  HOSPITALIST    Ht:  1.803 m (5' 11\")   Wt:  77.3 kg (170 lb 6.7 oz)   Admission Wt:  76.5 kg (168 lb 10.4 oz)    BMI:  23.77 kg/m 2   BSA:  1.97 m 2            Patient PCP Information     Provider PCP Type    [facility]      ED Clinical Impression     Diagnosis Description Comment Added By Time Added    Septic shock (H) [A41.9, R65.21] Septic shock (H) [A41.9, R65.21]  Srikanth Ryan MD 10/3/2017 11:10 PM    Acute respiratory failure with hypoxia and hypercapnia (H) [J96.01, J96.02] Acute respiratory failure with hypoxia and hypercapnia (H) [J96.01, J96.02]  Srikanth Ryan MD 10/3/2017 11:10 PM    Acidosis [E87.2] Acidosis [E87.2]  Srikanth Ryan MD 10/3/2017 11:11 PM      Hospital Problems as of 10/7/2017              Priority Class Noted POA    Sepsis (H) Medium  11/15/2015 Yes      Non-Hospital Problems as of 10/7/2017              Priority Class Noted    Appendicitis Medium  2013    Panhypopituitarism (H) Medium  Unknown    Hematemesis Medium  3/13/2014    Seizure (H) Medium  2014    Seizures (H) Medium  10/4/2014    Recurrent seizures (H) Medium  3/8/2015    Septic shock (H) Medium  2016    Pneumonia of both lower lobes due to infectious organism Medium  2016    Community acquired pneumonia Medium  2016    Breakthrough seizure (H) Medium  2016    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  2016    Hyponatremia Medium  2016    PEG tube malfunction (H) Medium  10/31/2016    Pneumonia Medium  2016    Cardiac arrest (H) " Medium  11/26/2016    Acute respiratory failure with hypoxia (H) Medium  11/26/2016    Necrotizing pancreatitis Medium  1/23/2017    Pneumonia, aspiration (H) Medium  6/5/2017    Encephalopathy Medium  6/6/2017      Code Status History     Date Active Date Inactive Code Status Order ID Comments User Context    10/7/2017 10:09 AM  Full Code [id number]  Migel Stoddard MD Outpatient    10/4/2017  1:08 AM 10/7/2017 10:09 AM Full Code [id number]  Manish Meier MD Inpatient    6/7/2017 10:05 AM 10/4/2017  1:08 AM Full Code [id number]  Hayden Epperson DO Outpatient    6/5/2017  1:07 AM 6/7/2017 10:05 AM Full Code [id number]  Manish Motta MD Inpatient    1/23/2017  6:04 PM 2/10/2017  3:21 PM Full Code [id number]  Yoel Montgomery MD Inpatient    1/23/2017 12:35 PM 1/23/2017  6:04 PM Full Code [id number]  Shaneka Clark PA-C Outpatient    1/21/2017  6:33 PM 1/23/2017 12:35 PM Full Code [id number]  Marquez Green MD Inpatient    12/9/2016 10:25 AM 1/21/2017  6:33 PM Full Code [id number]  Elen Carrillo MD Outpatient    12/8/2016 11:30 AM 12/9/2016 10:25 AM Full Code [id number]  Alicia Roca APRN [facility] Outpatient    11/23/2016  6:52 PM 12/8/2016 11:30 AM Full Code [id number]  Shea Wei MD Inpatient    10/31/2016  1:24 PM 11/23/2016  6:52 PM Full Code [id number]  Bindu Barillas PA-C Outpatient    10/31/2016  4:53 AM 10/31/2016  1:24 PM Full Code [id number]  Regino Valenzuela MD ED    9/9/2016  7:48 AM 10/31/2016  4:53 AM Full Code [id number]  Shea Xiong MD Outpatient    8/23/2016  9:05 PM 9/9/2016  7:48 AM Full Code [id number]  Adonay Fernandez MD Inpatient    7/24/2016 12:45 PM 7/25/2016  7:34 PM Full Code [id number]  Jossie Higgins MD Inpatient    1/6/2016  2:05 PM 7/24/2016 12:45 PM Full Code [id number]  Romero Zamarripa MD Outpatient    1/5/2016 12:18 AM 1/6/2016  2:05 PM Full Code [id number]  Israel Christianson MD Inpatient     11/18/2015 11:13 AM 1/5/2016 12:18 AM Full Code [id number]  Starr Champion MD Outpatient    11/15/2015 11:37 AM 11/18/2015 11:13 AM Full Code [id number]  Abhi Cruz MD Inpatient    3/9/2015 10:35 AM 11/15/2015 11:37 AM Full Code [id number]  Danyelle Pelayo MD Outpatient    7/5/2014 11:32 AM 3/9/2015 10:35 AM Full Code [id number]  Shea Phillips MD Outpatient    7/4/2014 11:49 AM 7/5/2014 11:32 AM Full Code [id number]  Starr Champion MD ED    3/14/2014  2:01 PM 7/4/2014 11:49 AM Full Code [id number]  Starr Champion MD Outpatient    3/13/2014  6:06 PM 3/14/2014  2:01 PM Full Code [id number]  Carrie Hanson RN Inpatient    7/30/2013  9:12 PM 8/1/2013  4:35 PM Full Code [id number]  Sharon Zeng RN Inpatient         Medication Review      START taking        Dose / Directions Comments    ciprofloxacin 500 MG tablet   Commonly known as:  CIPRO   Indication:  Healthcare-Associated Pneumonia   Used for:  Pneumonia of right lower lobe due to infectious organism (H)        Dose:  500 mg   1 tablet (500 mg) by Per Feeding Tube route every 12 hours   Quantity:  14 tablet   Refills:  0        minocycline 100 MG capsule   Commonly known as:  MINOCIN/DYNACIN   Indication:  Healthcare-Associated Pneumonia   Used for:  Pneumonia of right lower lobe due to infectious organism (H)        Dose:  100 mg   Take 1 capsule (100 mg) by mouth every 12 hours for 7 days   Quantity:  14 capsule   Refills:  0          CONTINUE these medications which may have CHANGED, or have new prescriptions. If we are uncertain of the size of tablets/capsules you have at home, strength may be listed as something that might have changed.        Dose / Directions Comments    carBAMazepine 100 MG/5ML suspension   Commonly known as:  TEGretol   This may have changed:  when to take this   Used for:  Pneumonia of right lower lobe due to infectious organism (H)   Notes to Patient:  7 AM, 3 PM and 11 PM        Dose:  150 mg   Take 7.5  mLs (150 mg) by mouth every 8 hours   Quantity:  900 mL   Refills:  0        levothyroxine 25 mcg/mL Susp   Commonly known as:  SYNTHROID   This may have changed:  additional instructions   Used for:  Panhypopituitarism (H)        Dose:  150 mcg   6 mLs (150 mcg) by Per J Tube route every morning (before breakfast)   Refills:  0          CONTINUE these medications which have NOT CHANGED        Dose / Directions Comments    ACETAMINOPHEN PO        Dose:  650 mg   650 mg by Per Feeding Tube route every 4 hours as needed for pain   Refills:  0        aspirin 10 mg/mL Susp   Used for:  Routine adult health maintenance        Dose:  81 mg   8.1 mLs (81 mg) by Per J Tube route daily   Refills:  0        bacitracin ointment        Apply topically daily as needed for wound care To PEG site.   Refills:  0        bisacodyl 10 MG Suppository   Commonly known as:  DULCOLAX   Used for:  Constipation, unspecified constipation type        Dose:  10 mg   Place 1 suppository (10 mg) rectally daily as needed for constipation   Quantity:  30 suppository   Refills:  0        BRIVIACT 10 MG/ML solution   Generic drug:  Brivaracetam        Dose:  100 mg   Take 100 mg by mouth 2 times daily   Refills:  0        calcium carbonate 1250 (500 CA) MG/5ML Susp suspension   Used for:  Malnutrition (H)        Dose:  1250 mg   5 mLs (1,250 mg) by Per J Tube route 3 times daily (with meals)   Quantity:  450 mL   Refills:  0        fiber modular packet   Used for:  Necrotizing pancreatitis        Dose:  1 packet   1 packet by Per Feeding Tube route 3 times daily   Refills:  0        * hydrocortisone 2 mg/mL Susp   Commonly known as:  CORTEF   Used for:  Panhypopituitarism (H)        Dose:  20 mg   10 mLs (20 mg) by Per J Tube route every morning   Refills:  0        * hydrocortisone 2 mg/mL Susp   Commonly known as:  CORTEF   Used for:  Panhypopituitarism (H)        Dose:  10 mg   Take 5 mLs (10 mg) by mouth every evening   Refills:  0         melatonin 1 MG/ML Liqd liquid   Used for:  Insomnia, unspecified type        Dose:  6 mg   6 mLs (6 mg) by Per J Tube route every evening   Refills:  0        miconazole 2 % powder   Commonly known as:  MICATIN; MICRO GUARD   Used for:  Rash        Apply topically every hour as needed for other (topical candidiasis)   Refills:  0        multivitamins with minerals Liqd liquid   Used for:  Necrotizing pancreatitis, Malnutrition (H)        Dose:  15 mL   15 mLs by Per J Tube route daily   Refills:  0        pantoprazole Susp suspension   Commonly known as:  PROTONIX        Dose:  20 mg   Take 20 mg by mouth 2 times daily   Refills:  0        polyethylene glycol Packet   Commonly known as:  MIRALAX/GLYCOLAX   Used for:  Constipation, unspecified constipation type        Dose:  17 g   Take 17 g by mouth 2 times daily as needed (constipation)   Quantity:  7 packet   Refills:  0        potassium & sodium phosphates 280-160-250 MG Packet   Commonly known as:  NEUTRA-PHOS        Dose:  1 packet   Take 1 packet by mouth 2 times daily 0900, 1500, 2100.   Refills:  0        testosterone cypionate 200 MG/ML injection   Commonly known as:  DEPOTESTOTERONE        Dose:  100 mg   Inject 100 mg into the muscle once a week Fridays   Refills:  0        VITAMIN D (CHOLECALCIFEROL) PO        Dose:  2000 Units   Take 2,000 Units by mouth daily   Refills:  0        * Notice:  This list has 2 medication(s) that are the same as other medications prescribed for you. Read the directions carefully, and ask your doctor or other care provider to review them with you.              Further instructions from your care team       The following appointment has been scheduled for you:  Dr Gomez  Tuesday 10/10/17 at 2:45pm  [facility]  [phone]    [facility] phone: [phone] or [phone]    Summary of Visit     Reason for your hospital stay       Rt sided PNA             After Care     Activity       Your activity upon  discharge: activity as tolerated       Diet       Follow this diet upon discharge: Orders Placed This Encounter      Adult Formula Drip Feeding: Specified Time Isosource 1.5; Jejunostomy; Goal Rate: 90 mL/hr x 16 hrs (hold TF 1 hr before and 1 hr after Synthroid administration); mL/hr; From: 10:00 PM; 2:00 PM; Medication - Tube Feeding Flush Frequency: At least ...      NPO for Medical/Clinical Reasons Except for: No Exceptions               Referrals     Home care nursing referral       Resume [facility]       Home care nursing referral       Resume Home Care    Your provider has ordered home care nursing services. If you have not been contacted within 2 days of your discharge please call the inpatient department phone number at [phone] .             Follow-Up Appointment Instructions     Future Labs/Procedures    Follow-up and recommended labs and tests     Comments:    Follow up with primary care provider, [facility], within 7 days for hospital follow- up.  The following labs/tests are recommended: Carbamazepine level on 10/10/17.      Follow-Up Appointment Instructions     Follow-up and recommended labs and tests       Follow up with primary care provider, [facility], within 7 days for hospital follow- up.  The following labs/tests are recommended: Carbamazepine level on 10/10/17.             Statement of Approval     Ordered          10/07/17 1141  I have reviewed and agree with all the recommendations and orders detailed in this document.  EFFECTIVE NOW     Approved and electronically signed by:  Migel Stoddard MD

## 2017-10-03 NOTE — IP AVS SNAPSHOT
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 was getting RN vists and PCA services previously. The Home Care agency is [facility], p. [phone] or [phone] and f.[phone].  I) Spoke with patient's mother Savannah, who provided the PCA's name and phone, as he needs to be present when patient arrives. Reached PCA Keyon Dover at [phone]. He will be present when patient arrives home this afternoon. [facility] transport at 1400. BLS is needed due to patient's TBI and Seizure Disorder which require monitoring, and his right spastic hemiplegia which limits his ability to sit in a standard wheelchair. The PCS form was completed and faxed.  Called [facility] and spoke with Raissa. Faxed the orders and other information as requested.  A) Patient's mother agrees to this plan.  P) D/C home today with continued HHC and PCA coverage.[RH1.1]     Revision History        User Key Date/Time User Provider Type Action    > RH1.1 10/7/2017 12:48 PM Gary Carty  Sign            Progress Notes by Yoni Gaitan MD at 10/7/2017  9:14 AM     Author:  Yoni Gaitan MD Service:   Infectious Disease Author Type:  Physician    Filed:  10/7/2017  9:14 AM Date of Service:  10/7/2017  9:14 AM Creation Time:  10/7/2017  9:14 AM    Status:  Signed :  Yoni Gaitan MD (Physician)         [facility]  Infectious Disease Progress Note          Assessment and Plan:   IMPRESSION:   1.  A 57-year-old male with traumatic brain injury and spastic hemiplegia, admitted with acute fever, hypoxia and found to have infiltrates, consistent with pneumonia, very likely aspiration pneumonia.   2.  Prior history of dysphagia and aspiration pneumonia, previous pneumonias with pseudomonas lung colonization but last was late in 2016.   3.  Necrotizing pancreatitis earlier this year with resistant organisms including a highly resistant pseudomonas and vancomycin-resistant enterococcus, no symptoms of that currently.   4.  Traumatic brain injury with significant aphagia, has a G-tube in place.   5.  Seizure disorder.  6 MRSA colonized incl now       RECOMMENDATIONS:  1 Cultures neg, rapidly better, same tx while here  But OK with me disposition enteral cipro 500 bid and minocin 100 bid for 7 days        Interval History:   no new complaints and doing well; no + cxs, MRSA + nares, fever resolved off O2, WBC 6 K              Medications:       ciprofloxacin  500 mg Per Feeding Tube Q12H ALONDRA     minocycline  100 mg Oral Q12H ALONDRA     calcium carbonate  1,250 mg Oral TID w/meals     carBAMazepine  150 mg Per Feeding Tube Q8H ALONDRA     hydrocortisone  10 mg Oral or Feeding Tube QPM     hydrocortisone  20 mg Per J Tube QAM     aspirin  81 mg Per J Tube Daily     Brivaracetam  100 mg Oral BID     fiber modular  1 packet Per Feeding Tube TID     levothyroxine  150 mcg Per J Tube QAM AC     melatonin  6 mg Per J Tube QPM     multivitamins with minerals  15 mL Per J Tube Daily     potassium & sodium phosphates  1 packet Oral BID     cholecalciferol  2,000 Units Per G Tube Daily     enoxaparin  40 mg  Subcutaneous Q24H     pantoprazole  40 mg Per NG tube Daily     sodium chloride (PF)  3 mL Intracatheter Q8H                  Physical Exam:   Blood pressure 133/58, pulse 61, temperature 98.5  F (36.9  C), temperature source Oral, resp. rate 18, weight 77.3 kg (170 lb 6.7 oz), SpO2 96 %.  Wt Readings from Last 2 Encounters:   10/07/17 77.3 kg (170 lb 6.7 oz)   07/27/17 73.9 kg (163 lb)     Vital Signs with Ranges  Temp:  [97.4  F (36.3  C)-98.5  F (36.9  C)] 98.5  F (36.9  C)  Pulse:  [55-61] 61  Heart Rate:  [74-80] 74  Resp:  [18-20] 18  BP: (110-133)/(58-66) 133/58  SpO2:  [95 %-98 %] 96 %    Constitutional: Awake, alert, baseline neuro   Lungs: Clear to auscultation bilaterally, no crackles or wheezing   Cardiovascular: Regular rate and rhythm, normal S1 and S2, and no murmur noted   Abdomen: Normal bowel sounds, soft, non-distended, non-tender   Skin: No rashes, no cyanosis, no edema   Other:           Data:   All microbiology laboratory data reviewed.  Recent Labs   Lab Test  10/07/17   0752  10/06/17   0825  10/05/17   0815   WBC  5.4  6.2  13.6*   HGB  12.4*  12.3*  12.9*   HCT  37.0*  37.6*  39.3*   MCV  85  86  85   PLT  129*  120*  111*     Recent Labs   Lab Test  10/07/17   0752  10/06/17   0825  10/05/17   0815   CR  0.82  0.85  0.84     No lab results found.  Recent Labs   Lab Test  10/03/17   2205  10/03/17   2155  01/30/17   1453  01/23/17   1720  01/22/17   1430  01/22/17   0038  01/21/17   1608  01/21/17   1600  11/29/16   0836   CULT  No growth after 3 days  No growth after 3 days  Moderate growth Pseudomonas aeruginosa  Light growth Enterobacter cloacae complex  Moderate growth Candida albicans / dubliniensis Candida albicans and Candida   dubliniensis are not routinely speciated Susceptibility testing not routinely   done  Moderate growth Enterococcus faecium (VRE)  *  No anaerobes isolated  Canceled, Test credited Duplicate request PCR WAS ALREADY ORDERED  No anaerobes isolated  Heavy growth  Enterobacter cloacae complex  Heavy growth Enterococcus faecium (VRE)  Critical Value/Significant Value called to and read back by Phylicia Lamar RN, @   6140 01.24.2017 BL  *  No growth  No growth  No growth  Light growth Normal jaime  Heavy growth Pseudomonas aeruginosa  *[SD1.1]          Revision History        User Key Date/Time User Provider Type Action    > SD1.1 10/7/2017  9:14 AM Yoni Gaitan MD Physician Sign            Progress Notes by Marika Villalpando PT at 10/6/2017  6:22 PM     Author:  Marika Villalpando PT Service:  (none) Author Type:  Physical Therapist    Filed:  10/6/2017  6:22 PM Date of Service:  10/6/2017  6:22 PM Creation Time:  10/6/2017  6:22 PM    Status:  Signed :  aMrika Villalpando PT (Physical Therapist)            10/06/17 1501   Quick Adds   Type of Visit Initial PT Evaluation   Living Environment   Lives With parent(s)   Living Arrangements house   Home Accessibility no concerns   Living Environment Comment Patients mom reports Keyon could transfers with assist of just her until her was hospitalized last year for a long time and a lift was used at the hospital to transfer him all the time. Since he has been home he has 11.5 hours of PCA and nursing assist/day and the male caregivers transfer him with assist of one and female caregivers need assist of 2.    Self-Care   Dominant Hand left   Usual Activity Tolerance fair   Current Activity Tolerance fair   Functional Level Prior   Ambulation 4-->completely dependent   Transferring 2-->assistive person   Toileting 2-->assistive person   Bathing 3-->assistive equipment and person   Communication 3-->unable to speak (not related to language barrier)   Cognition 2 - difficulty with organizing thoughts   General Information   Onset of Illness/Injury or Date of Surgery - Date 10/04/17   Referring Physician Manish Meier   Patient/Family Goals Statement Per patients mom, she will take him home with continued assist from  nursing and  PCA's.    Pertinent History of Current Problem (include personal factors and/or comorbidities that impact the POC) Keyon Farias is a 57 year old male with a history of TBI with resultant seizure disorder, aphasia, right sided hemiplegia, and recent treatment for necrotizing pancreatitis who presents to the emergency department today for evaluation of respiratory distress, fever and change in mental status    Precautions/Limitations fall precautions   Weight-Bearing Status - LUE full weight-bearing   Weight-Bearing Status - RUE full weight-bearing   Weight-Bearing Status - LLE full weight-bearing   Weight-Bearing Status - RLE full weight-bearing   General Observations Patient unable to verbalize but can make some of his needs known through actions. Appears to understand to some degrees.    Cognitive Status Examination   Level of Consciousness alert   Follows Commands and Answers Questions able to follow single-step instructions   Cognitive Comment Difficult to assess orientation and memory due to inability to speak, but patient able to follow some very simple commands with left UE and LE and nodded yes and no appropriately.    Posture    Posture Comments Spine rounded and forward head. Tends to lean right.    Range of Motion (ROM)   ROM Comment Keeps right arm flexed by his side and indicated pain if therapist moved it at all. Bilateral LE's with limited DF.    Strength   Strength Comments Able to move left UE and LE against gravity. Did not dorsiflex left foot so question if able. Appears somewhat stuck in plantarflexion.    Bed Mobility   Bed Mobility Comments Max assist of 1 for sup to sit.    Transfer Skills   Transfer Comments Transferred via stand pivot from bed to w/c with max assist of 1 and mod assist of 1. Difficult to get into partial standing position.    Balance   Balance Comments Able to maintain static sitting balance for brief periods but unable to regain balance if he loses it.    Muscle Tone  "  Muscle Tone Comments Increased in right UE and LE.    Clinical Impression   Criteria for Skilled Therapeutic Intervention evaluation only   PT Diagnosis Impaired independence with all mobility   Influenced by the following impairments Generalized weakness throughout. Right hemiparesis, impaired ability to follow all commands   Functional limitations due to impairments Needs assist for all mobility   Clinical Presentation Stable/Uncomplicated   Clinical Decision Making (Complexity) Low complexity   Therapy Frequency` (No further inpatient therapy as patient d/c planned for jae)   Predicted Duration of Therapy Intervention (days/wks) No further inpatient PT as patient anticipated discharge is for tomorrow.   Anticipated Discharge Disposition Home with Home Therapy  (Feel pateint would benefit from home PT to maximize independ)   Risk & Benefits of therapy have been explained Yes   Patient, Family & other staff in agreement with plan of care Yes   [facility] \"6 Clicks\"   2016, Trustees of [facility], under license to [employer].  All rights reserved.   6 Clicks Short Forms Basic Mobility Inpatient Short Form   [facility]  \"6 Clicks\" V.2 Basic Mobility Inpatient Short Form   1. Turning from your back to your side while in a flat bed without using bedrails? 2 - A Lot   2. Moving from lying on your back to sitting on the side of a flat bed without using bedrails? 2 - A Lot   3. Moving to and from a bed to a chair (including a wheelchair)? 2 - A Lot   4. Standing up from a chair using your arms (e.g., wheelchair, or bedside chair)? 2 - A Lot   5. To walk in hospital room? 1 - Total   6. Climbing 3-5 steps with a railing? 1 - Total   Basic Mobility Raw Score (Score out of 24.Lower scores equate to lower levels of function) 10   Total Evaluation Time   Total Evaluation Time (Minutes) 30[SL1.1]        Revision History        User Key Date/Time User Provider Type Action    > SL1.1 " 10/6/2017  6:22 PM Marika Villalpando, PT Physical Therapist Sign            Progress Notes by Migel Stoddard MD at 10/6/2017 11:13 AM     Author:  Migel Stoddard MD Service:  Hospitalist Author Type:  Physician    Filed:  10/6/2017 11:50 AM Date of Service:  10/6/2017 11:13 AM Creation Time:  10/6/2017 11:13 AM    Status:  Signed :  Migel Stoddard MD (Physician)         [facility]    Hospitalist Progress Note[NB1.1]    Assessment & Plan[NB1.2]   Keyon Farias is a 57 year old male with past history TBI with right spastic hemiplegia, aphasia, dysphagia s/p G-tube, seizure disorder, panhypopit and V-tach/V-fib who presented with sepsis due to right middle and lower lobe pneumonia.     Severe sepsis due to right middle and lower lobe pneumonia  Acute hypoxic and hypercapnic respiratory failure  -Presented with fever (102), tachycardia (150's), tachypnea (40's), leukocytosis (17 with left shift) with -ABG demonstrating hypoxia and hypercapnea with CT chest showing right middle and lower lobe infiltrates.    -Lactate elevated at 10.  Given amikacin, linezolid and ciprofloxacin in ED given history of drug-resistant organisms (including VRE and Psuedomonas resistant to Zosyn and meropenem though these were isolated from pancreatic fluid).  -suspect this is most likely a typical aspiration pneumonia given dysphagia and mother reporting she gives him 3-4 bites daily[NB1.1]  -Cultures negative[NB1.3]  -Appreciate infectious disease consult[NB1.1]; switch to oral Cipro and minocycline at discharge[NB1.3]  -Off oxygen[NB1.1]   -Discontinue telemetry[NB1.3]     TBI with aphasia, dysphagia s/p G-tube, right spastic hemiplegia  Able to respond to simple questions.  Nonverbal at baseline  -Continue tube feedings per nutrition consult     Seizure disorder[NB1.1]/supratherapeutic carbamazepine level[NB1.3]  Thought due to TBI.  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  -continue  PTA hydrocortisone and PTA levothyroxine    Mild thrombocytopenia:  -Likely due to the septic process, platelets[NB1.1] stable at 120[NB1.3]      D/W: RN  DVT Prophylaxis: Pneumatic Compression Devices  Code Status:[NB1.1] Full Code[NB1.2]    Disposition: Expected discharge 10/[NB1.1]7[NB1.3]    Migel Stoddard[NB1.2], MD[NB1.1]    Interval History[NB1.2]   Afebrile, no new complaints.  Tegretol level high[NB1.3]    -Data reviewed today: I reviewed all new labs and imaging results over the last 24 hours. I personally reviewed no images or EKG's today.[NB1.1]    Physical Exam   Temp: 97.4  F (36.3  C) Temp src: Oral BP: 122/62 Pulse: 73 Heart Rate: 87 Resp: 18 SpO2: 95 % O2 Device: None (Room air)    Vitals:    10/04/17 0200   Weight: 76.5 kg (168 lb 10.4 oz)[NB1.2]     Vital Signs with Ranges[NB1.1]  Temp:  [97.4  F (36.3  C)-98  F (36.7  C)] 97.4  F (36.3  C)  Pulse:  [65-73] 73  Heart Rate:  [87] 87  Resp:  [18] 18  BP: (116-122)/(56-62) 122/62  SpO2:  [94 %-97 %] 95 %  I/O last 3 completed shifts:  In: 2486 [I.V.:900; NG/GT:975]  Out: 2325 [Urine:2325][NB1.2]    Constitutional: Awake,[NB1.1] verbalizes one or two and comprehensible words-Baseline[NB1.3]  HEENT: Moist oral mucosa, No pallor or icterus  Neck- Supple, Good ROM, No JVD  Respiratory:[NB1.1]  Clear to auscultation[NB1.3]; Normal WOB  Cardiovascular: RRR, No murmur  GI: Soft, Non- tender, feeding tube in place  Skin/Integument: Warm and dry, no rashes  MSK: No joint deformity or swelling, no edema  Neuro: CN- grossly intact[NB1.1]      Medications        hydrocortisone  10 mg Oral or Feeding Tube QPM     hydrocortisone  20 mg Per J Tube QAM     vancomycin (VANCOCIN) IV  1,500 mg Intravenous Q18H      aspirin  81 mg Per J Tube Daily     Brivaracetam  100 mg Oral BID     fiber modular  1 packet Per Feeding Tube TID     levothyroxine  150 mcg Per J Tube QAM AC     melatonin  6 mg Per J Tube QPM     multivitamins with minerals  15 mL Per J Tube Daily     potassium & sodium phosphates  1 packet Oral BID     cholecalciferol  2,000 Units Per G Tube Daily     enoxaparin  40 mg Subcutaneous Q24H     pantoprazole  40 mg Per NG tube Daily     calcium carbonate  1,250 mg Oral TID w/meals     ciprofloxacin  400 mg Intravenous Q12H     meropenem  500 mg Intravenous Q6H     sodium chloride (PF)  3 mL Intracatheter Q8H       Data     Recent Labs  Lab 10/06/17  0825 10/05/17  0815 10/04/17  0610 10/03/17  2151   WBC 6.2 13.6* 17.6* 17.0*   HGB 12.3* 12.9* 14.6 16.9   MCV 86 85 83 85   * 111* 122* 175    146* 135 132*   POTASSIUM 3.8 4.2 4.2 5.2   CHLORIDE 110* 113* 101 95   CO2 27 26 25 23   BUN 15 14 14 16   CR 0.85 0.84 0.98 0.95   ANIONGAP 6 7 9 14   STEVE 8.3* 8.3* 8.7 9.7   * 140* 98 70   ALBUMIN  --   --   --  3.8   PROTTOTAL  --   --   --  9.2*   BILITOTAL  --   --   --  0.4   ALKPHOS  --   --   --  91   ALT  --   --   --  36   AST  --   --   --  43   LIPASE  --   --   --  299       No results found for this or any previous visit (from the past 24 hour(s)).[NB1.2]     Revision History        User Key Date/Time User Provider Type Action    > NB1.3 10/6/2017 11:50 AM Migel Stoddard MD Physician Sign     NB1.2 10/6/2017 11:14 AM Migel Stoddard MD Physician      NB1.1 10/6/2017 11:13 AM Migel Stoddard MD Physician             Progress Notes by Unique Schultz RD, KECIA at 10/6/2017 10:38 AM     Author:  Unique Schultz RD, LD Service:  Nutrition Author Type:  Registered Dietitian    Filed:  10/6/2017 10:49 AM Date of Service:  10/6/2017 10:38 AM Creation Time:  10/6/2017 10:38 AM    Status:  Signed :  Unique Schultz RD, KECIA (Registered Dietitian)         CLINICAL NUTRITION SERVICES -  REASSESSMENT NOTE      EVALUATION OF PROGRESS TOWARD GOALS   Diet:  NPO  Nutrition Support:  - Nocturnal TF Isosource 1.5 at 90 mL/hr x 14 hrs (from 10 pm - 2 pm + hold 1 hr before and 1 hr after Synthroid per mother's request) = 1890 kcals, 86 gm pro (1.1 gm/kg and 93% protein needs), 958 mL H20, 19 gm fiber, 222 gm CHO  - NutriSource Fiber 3 packets per day = 45 kcals, 9 gm fiber  - Total (TF + NutriSource Fiber):  1935 kcals (25 kcal/kg), 28 gm fiber.  - H2O flushes to 150 mL q 4 hours for total fluid (TF + flushes) = 1850 mL/day.    Intake:  Pt continues to tolerate noc TF without issues.  BM x 2 past 24 hours      ASSESSED NUTRITION NEEDS  (Dosing Weight:  76.5 kg - admit wt)  Estimated Energy Needs: 6247-2521 kcals (25-30 Kcal/Kg) - maintenance  Estimated Protein Needs:   grams protein (1.2-1.5 g pro/Kg) - Repletion  Estimated Fluid Needs:  4522-6496 mL (1 mL/Kcal) - maintenance    NEW FINDINGS:   WOCN 10/5: Coccyx: Ricci risk but no identified PI @ this time  No recent weight  Pt continues on VIt D, Certavite, and Neutra-Phos    Previous Goals:   Nocturnal TF will meet % estimated needs.  Evaluation: Met    Previous Nutrition Diagnosis:   Inadequate protein-energy intake related to TF held and severe dysphagia as evidenced by meeting 0% needs with plan for TF resumption   Evaluation: Resolved      CURRENT NUTRITION DIAGNOSIS  No nutrition diagnosis identified at this time     INTERVENTIONS  Recommendations / Nutrition Prescription  Continue current TF, as at home    Implementation  No interventions at this time    Goals  Same: Nocturnal TF will meet % estimated needs.      MONITORING AND EVALUATION:  Progress towards goals will be monitored and evaluated per protocol and Practice Guidelines    Unique Schultz RD  Pager [phone] (M-F)            [phone] (W/E & Hol)[CM1.1]             Revision History        User Key Date/Time User Provider Type Action    > CM1.1 10/6/2017 10:49 AM [place]  Unique ROBIN RD, LD Registered Dietitian Sign            Progress Notes by Yoni Gaitan MD at 10/6/2017 10:44 AM     Author:  Yoni Gaitan MD Service:  Infectious Disease Author Type:  Physician    Filed:  10/6/2017 10:46 AM Date of Service:  10/6/2017 10:44 AM Creation Time:  10/6/2017 10:44 AM    Status:  Signed :  Yoni Gaitan MD (Physician)         [facility]  Infectious Disease Progress Note          Assessment and Plan:   IMPRESSION:   1.  A 57-year-old male with traumatic brain injury and spastic hemiplegia, admitted with acute fever, hypoxia and found to have infiltrates, consistent with pneumonia, very likely aspiration pneumonia.   2.  Prior history of dysphagia and aspiration pneumonia, previous pneumonias with pseudomonas lung colonization but last was late in 2016.   3.  Necrotizing pancreatitis earlier this year with resistant organisms including a highly resistant pseudomonas and vancomycin-resistant enterococcus, no symptoms of that currently.   4.  Traumatic brain injury with significant aphagia, has a G-tube in place.   5.  Seizure disorder.  6 MRSA colonized incl now       RECOMMENDATIONS:  1 Cultures neg, rapidly better, same while here  But OK with me disposition enteral cipro 500 bid and minocin 100 bid for 7 days        Interval History:   no new complaints and doing well; no + cxs, MRSA + nares, fever resolved off O2, WBC 6 K              Medications:       hydrocortisone  10 mg Oral or Feeding Tube QPM     hydrocortisone  20 mg Per J Tube QAM     vancomycin (VANCOCIN) IV  1,500 mg Intravenous Q18H     aspirin  81 mg Per J Tube Daily     Brivaracetam  100 mg Oral BID     fiber modular  1 packet Per Feeding Tube TID     levothyroxine  150 mcg Per J Tube QAM AC     melatonin  6 mg Per J Tube QPM     multivitamins with minerals  15 mL Per J Tube Daily     potassium & sodium phosphates  1 packet Oral BID     cholecalciferol  2,000 Units Per G Tube Daily      enoxaparin  40 mg Subcutaneous Q24H     pantoprazole  40 mg Per NG tube Daily     calcium carbonate  1,250 mg Oral TID w/meals     ciprofloxacin  400 mg Intravenous Q12H     meropenem  500 mg Intravenous Q6H     sodium chloride (PF)  3 mL Intracatheter Q8H                  Physical Exam:   Blood pressure 122/62, pulse 73, temperature 97.4  F (36.3  C), resp. rate 18, weight 76.5 kg (168 lb 10.4 oz), SpO2 95 %.  Wt Readings from Last 2 Encounters:   10/04/17 76.5 kg (168 lb 10.4 oz)   07/27/17 73.9 kg (163 lb)     Vital Signs with Ranges  Temp:  [97.4  F (36.3  C)-98  F (36.7  C)] 97.4  F (36.3  C)  Pulse:  [65-73] 73  Heart Rate:  [87] 87  Resp:  [18] 18  BP: (116-122)/(56-62) 122/62  SpO2:  [94 %-97 %] 95 %    Constitutional: Awake, alert, baseline neuro   Lungs: Clear to auscultation bilaterally, no crackles or wheezing   Cardiovascular: Regular rate and rhythm, normal S1 and S2, and no murmur noted   Abdomen: Normal bowel sounds, soft, non-distended, non-tender   Skin: No rashes, no cyanosis, no edema   Other:           Data:   All microbiology laboratory data reviewed.  Recent Labs   Lab Test  10/06/17   0825  10/05/17   0815  10/04/17   0610   WBC  6.2  13.6*  17.6*   HGB  12.3*  12.9*  14.6   HCT  37.6*  39.3*  42.7   MCV  86  85  83   PLT  120*  111*  122*     Recent Labs   Lab Test  10/06/17   0825  10/05/17   0815  10/04/17   0610   CR  0.85  0.84  0.98     No lab results found.  Recent Labs   Lab Test  10/03/17   2205  10/03/17   2155  01/30/17   1453  01/23/17   1720  01/22/17   1430  01/22/17   0038  01/21/17   1608  01/21/17   1600  11/29/16   0836   CULT  No growth after 2 days  No growth after 2 days  Moderate growth Pseudomonas aeruginosa  Light growth Enterobacter cloacae complex  Moderate growth Candida albicans / dubliniensis Candida albicans and Candida   dubliniensis are not routinely speciated Susceptibility testing not routinely   done  Moderate growth Enterococcus faecium (VRE)  *  No  "anaerobes isolated  Canceled, Test credited Duplicate request PCR WAS ALREADY ORDERED  No anaerobes isolated  Heavy growth Enterobacter cloacae complex  Heavy growth Enterococcus faecium (VRE)  Critical Value/Significant Value called to and read back by Phylicia Lamar RN, @   8187 01.24.2017 BL  *  No growth  No growth  No growth  Light growth Normal jaime  Heavy growth Pseudomonas aeruginosa  *[SD1.1]          Revision History        User Key Date/Time User Provider Type Action    > SD1.1 10/6/2017 10:46 AM Yoni Gaitan MD Physician Sign            Progress Notes by Karen Collins, RN at 10/6/2017  8:04 AM     Author:  Karen Collins, RN Service:  Care Coordinator Author Type:      Filed:  10/6/2017  9:26 AM Date of Service:  10/6/2017  8:04 AM Creation Time:  10/6/2017  8:04 AM    Status:  Addendum :  Karen Collins RN ()         Care Coordinator reviewed pt's records and then called his pt's Mother Savannah yesterday afternoon.  She is aware that pt may discharge today.  A little bit about pt's history is he was able to walk independently with a walker up until 8/2016 when started having more seizure activity.  His baseline is now able to bear weight and pivot transfer.  Savannah shared that pt is allotted 11.5 hrs per day for Nursing and PCA thru [facility] and his home agency is [facility].  Savannah shared that they have had difficulty staffing, so some days go unfilled and she is his caregiver.  She transports pt to appointments in her car with the help of the home care nurse.  Nsg is noting up with a lift.  Pt will need to pivot transfer, as they do not have a lift at home.  [place] is not interested in short term rehab, as she noted \"they practically killed him\".  Will await PT evaluation and connect with his home care agency this AM.[HN1.1]    Addendum:  Care Coordinator has alerted pt's mother that pt will not discharge today due to high Tegretol level.  " She would like to talk with [facility] Hospitalist when she visits this afternoon.[HN1.2]     Revision History        User Key Date/Time User Provider Type Action    > HN1.2 10/6/2017  9:26 AM Karen Collins RN Case Manager Addend     HN1.1 10/6/2017  8:13 AM Karen Collins RN Case Manager Sign            Progress Notes by Mirna Conley RN at 10/5/2017  3:49 PM     Author:  Mirna Conley RN Service:  [facility] Nurse Author Type:  Enterstomal Therapist    Filed:  10/5/2017  4:09 PM Date of Service:  10/5/2017  3:49 PM Creation Time:  10/5/2017  3:49 PM    Status:  Signed :  Mirna Conley RN (Enterstomal Therapist)         [facility] Nurse Inpatient Wound Assessment     Initial Assessment of wound(s) :  Linear area base of coccyx - suspected PI - community acquired                                                                                    Data:   Patient History:       Keyon Farias is a 57 year old male with past history TBI with right spastic hemiplegia, aphasia, dysphagia s/p G-tube, seizure disorder, panhypopit and V-tach/V-fib who presented with sepsis due to right middle and lower lobe pneumonia.        Moisture Management:  Lerma for UIC / Incontinence protocol for FIC    Catheter secured? Yes      Current Diet / Nutrition:            Active Diet Order: TF    Ricci Assessment and sub scores:  Ricci Score  Av.8  Min: 12  Max: 14         Labs          Recent Labs   Lab Test  10/05/17   0815   10/03/17   2151   17   0452   17   0228   16   0405   ALBUMIN   --    --   3.8   < >   --    --    --    < >  2.0*   HGB  12.9*   < >  16.9   < >  9.4*   < >   --    < >  7.6*   INR   --    --    --    --   1.27*   --    --    < >   --    WBC  13.6*   < >  17.0*   < >  11.7*   < >   --    < >  18.0*   A1C   --    --    --    --    --    --    --    --   5.1   CRP   --    --    --    --    --    --   87.0*   < >   --     < > = values in this interval not  displayed.       Wound Assessment (location #2  Coccyx - community acquired  Wound History:       Wound Base: 100% pink dermis    Specific Dimensions (length x width x depth, in cm) butterfly-shaped area of dry  peeling epidermis, slightly blanchable over distal coccyx, remainder is blanchable.     Tunneling:  N/A    Undermining: N/A    Palpation of the wound bed:  firm    Slough appearance:  none    Eschar appearance:  none    Periwound Skin: intact,      Color:consistent with surrounding tissue    Temperature  normal    Drainage:  none    Odor: none        Intervention:     Patient's chart evaluated.      Wound(s) was assessed    Wound Care: was done:   All Cleaned MicroKlenz                                                  Coccyx: Mepilex sacral                                                        Supplies  In floor supply room            Assessment:     Coccyx: Ricci risk but no identified PI @ this time.          Plan:     Nursing to notify the Provider(s) and re-consult the WOC Nurse if wound(s) deteriorate(s) or if the wound care plan needs reevaluation.       Coccyx:           -Change dressing on odd days         - Mepilex sacral         -PIP:   reinforce Rt/LT positioning in bed, HOB @ 30 degrees for TF; Heel suspension @ all times in bed;                    Ricci risk: document interventions; consider Pulsate mattress for extended stay: full skin                     Inspections BID including under tubes and reposition, monitor skin for development candida                          WOC Nurse will return: weekly and prn     Face to face time: 15 minutes[JP1.1]            Revision History        User Key Date/Time User Provider Type Action    > JP1.1 10/5/2017  4:09 PM Mirna Conley RN Enterstomal Therapist Sign            Progress Notes by Migel Stoddard MD at 10/5/2017  9:38 AM     Author:  Migel Stoddard MD Service:  Hospitalist Author Type:  Physician    Filed:  10/5/2017  9:57 AM Date of  Service:  10/5/2017  9:38 AM Creation Time:  10/5/2017  9:38 AM    Status:  Signed :  Migel Stoddard MD (Physician)         [facility]    Hospitalist Progress Note[NB1.1]    Assessment & Plan[NB1.2]   Keyon Farias is a 57 year old male with past history TBI with right spastic hemiplegia, aphasia, dysphagia s/p G-tube, seizure disorder, panhypopit and V-tach/V-fib who present[NB1.1]ed[NB1.3] with sepsis due to right middle and lower lobe pneumonia.     Severe sepsis due to right middle and lower lobe pneumonia  Acute hypoxic and hypercapnic respiratory failure  -Present[NB1.1]ed[NB1.3] with fever (102), tachycardia (150's), tachypnea (40's), leukocytosis (17 with left shift) with -ABG demonstrating hypoxia and hypercapnea with CT chest showing right middle and lower lobe infiltrates.    -Lactate elevated at 10.  Given amikacin, linezolid and ciprofloxacin in ED given history of drug-resistant organisms (including VRE and Psuedomonas resistant to Zosyn and meropenem though these were isolated from pancreatic fluid).  -suspect this is most likely a typical aspiration pneumonia given dysphagia and mother reporting she gives him 3-4 bites daily  -continue with cipro, meropenem and vancomycin  -Appreciate infectious disease consult  -Lactate improved  -[NB1.1]Discontinue stress dose steroids[NB1.3]-[NB1.1]resume prior to admission steroid regimen  -Off oxygen[NB1.3]      Hyponatremia  Admission Na 132, this is slightly improved from recent baseline.  -Improved     TBI with aphasia, dysphagia s/p G-tube, right spastic hemiplegia  Able to respond to simple questions.  Nonverbal at baseline  -[NB1.1]Continue tube feedings per nutrition consult[NB1.3]     Seizure disorder  Thought due to TBI.  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  -continue[NB1.1]  PTA[NB1.3] hydrocortisone and  PTA[NB1.1] l[NB1.3]evothyroxine[NB1.1]    Mild thrombocytopenia:  -Likely due to the septic process, platelets decreased to 111 today, monitor[NB1.3]      D/W: RN  DVT Prophylaxis: Pneumatic Compression Devices  Code Status:[NB1.1] Full Code[NB1.2]    Disposition: Expected discharge[NB1.1] 10/6[NB1.3]    Migel Stoddard[NB1.2], MD[NB1.1]    Interval History[NB1.2]   Continues to be afebrile.  Off oxygen.  No new complaints[NB1.3]    -Data reviewed today: I reviewed all new labs and imaging results over the last 24 hours. I personally reviewed no images or EKG's today.[NB1.1]    Physical Exam   Temp: 97.9  F (36.6  C) Temp src: Oral BP: 114/60 Pulse: 68 Heart Rate: 101 Resp: 18 SpO2: 94 % O2 Device: None (Room air)    Vitals:    10/04/17 0200   Weight: 76.5 kg (168 lb 10.4 oz)[NB1.2]     Vital Signs with Ranges[NB1.1]  Temp:  [97.4  F (36.3  C)-98.4  F (36.9  C)] 97.9  F (36.6  C)  Pulse:  [68-90] 68  Heart Rate:  [] 101  Resp:  [16-29] 18  BP: ()/(59-80) 114/60  SpO2:  [91 %-98 %] 94 %  I/O last 3 completed shifts:  In: 7042.5 [I.V.:6181.5; Other:250]  Out: 4450 [Urine:4450][NB1.2]    Constitutional: Awake, non-verbal  HEENT: Moist oral mucosa, No pallor or icterus  Neck- Supple, Good ROM, No JVD  Respiratory:  Few crackles bilaterally; Normal WOB  Cardiovascular: RRR, No murmur  GI: Soft, Non- tender, feeding tube in place  Skin/Integument: Warm and dry, no rashes  MSK: No joint deformity or swelling, no edema  Neuro: CN- grossly intact[NB1.1]      Medications        aspirin  81 mg Per J Tube Daily     Brivaracetam  100 mg Oral BID     carBAMazepine  150 mg Oral Q6H     fiber modular  1 packet Per Feeding Tube TID     levothyroxine  150 mcg Per J Tube QAM AC     melatonin  6 mg Per J Tube QPM     multivitamins with minerals  15 mL Per J Tube Daily     potassium & sodium phosphates  1 packet Oral BID     cholecalciferol  2,000 Units Per G Tube Daily     enoxaparin  40 mg Subcutaneous Q24H      pantoprazole  40 mg Per NG tube Daily     calcium carbonate  1,250 mg Oral TID w/meals     ciprofloxacin  400 mg Intravenous Q12H     meropenem  500 mg Intravenous Q6H     vancomycin (VANCOCIN) IV  1,500 mg Intravenous Q12H     hydrocortisone sodium succinate PF  50 mg Intravenous Q8H     influenza quadrivalent (PF) vacc age 3 yrs and older  0.5 mL Intramuscular Prior to discharge     sodium chloride (PF)  3 mL Intracatheter Q8H       Data     Recent Labs  Lab 10/05/17  0815 10/04/17  0610 10/03/17  2151   WBC 13.6* 17.6* 17.0*   HGB 12.9* 14.6 16.9   MCV 85 83 85   * 122* 175   * 135 132*   POTASSIUM 4.2 4.2 5.2   CHLORIDE 113* 101 95   CO2 26 25 23   BUN 14 14 16   CR 0.84 0.98 0.95   ANIONGAP 7 9 14   STEVE 8.3* 8.7 9.7   * 98 70   ALBUMIN  --   --  3.8   PROTTOTAL  --   --  9.2*   BILITOTAL  --   --  0.4   ALKPHOS  --   --  91   ALT  --   --  36   AST  --   --  43   LIPASE  --   --  299       No results found for this or any previous visit (from the past 24 hour(s)).[NB1.2]     Revision History        User Key Date/Time User Provider Type Action    > NB1.3 10/5/2017  9:57 AM Migel Stoddard MD Physician Sign     NB1.2 10/5/2017  9:39 AM Migel Stoddard MD Physician      NB1.1 10/5/2017  9:38 AM Migel Stoddard MD Physician             Progress Notes by Yoni Gaitan MD at 10/5/2017  8:40 AM     Author:  Yoni Gaitan MD Service:  Infectious Disease Author Type:  Physician    Filed:  10/5/2017  8:42 AM Date of Service:  10/5/2017  8:40 AM Creation Time:  10/5/2017  8:40 AM    Status:  Signed :  Yoni Gaitan MD (Physician)         [facility]  Infectious Disease Progress Note          Assessment and Plan:   IMPRESSION:   1.  A 57-year-old male with traumatic brain injury and spastic hemiplegia, admitted with acute fever, hypoxia and found to have infiltrates, consistent with pneumonia, very likely aspiration pneumonia.   2.  Prior history of dysphagia  and aspiration pneumonia, previous pneumonias with pseudomonas lung colonization but last was late in 2016.   3.  Necrotizing pancreatitis earlier this year with resistant organisms including a highly resistant pseudomonas and vancomycin-resistant enterococcus, no symptoms of that currently.   4.  Traumatic brain injury with significant aphagia, has a G-tube in place.   5.  Seizure disorder.  6 MRSA colonized incl now       RECOMMENDATIONS:  1 Continue meropenem, vancomycin and Cipro for now based on prior micro and likely micro, is clinically rapidly improved and likely not to have cultures, so probably simplify as clinical picture allows, po as soon as tomorrow assuming still doing well and BC neg.             Interval History:   no new complaints and doing well; no + cxs, MRSA + nares, fever resolved off O2, WBC 17 K              Medications:       aspirin  81 mg Per J Tube Daily     Brivaracetam  100 mg Oral BID     carBAMazepine  150 mg Oral Q6H     fiber modular  1 packet Per Feeding Tube TID     levothyroxine  150 mcg Per J Tube QAM AC     melatonin  6 mg Per J Tube QPM     multivitamins with minerals  15 mL Per J Tube Daily     potassium & sodium phosphates  1 packet Oral BID     cholecalciferol  2,000 Units Per G Tube Daily     enoxaparin  40 mg Subcutaneous Q24H     pantoprazole  40 mg Per NG tube Daily     calcium carbonate  1,250 mg Oral TID w/meals     ciprofloxacin  400 mg Intravenous Q12H     meropenem  500 mg Intravenous Q6H     vancomycin (VANCOCIN) IV  1,500 mg Intravenous Q12H     hydrocortisone sodium succinate PF  50 mg Intravenous Q8H     influenza quadrivalent (PF) vacc age 3 yrs and older  0.5 mL Intramuscular Prior to discharge     sodium chloride (PF)  3 mL Intracatheter Q8H                  Physical Exam:   Blood pressure 114/60, pulse 68, temperature 97.9  F (36.6  C), temperature source Oral, resp. rate 18, weight 76.5 kg (168 lb 10.4 oz), SpO2 94 %.  Wt Readings from Last 2 Encounters:    10/04/17 76.5 kg (168 lb 10.4 oz)   07/27/17 73.9 kg (163 lb)     Vital Signs with Ranges  Temp:  [97.4  F (36.3  C)-98.4  F (36.9  C)] 97.9  F (36.6  C)  Pulse:  [68-90] 68  Heart Rate:  [] 101  Resp:  [14-29] 18  BP: ()/(59-80) 114/60  SpO2:  [91 %-98 %] 94 %    Constitutional: Awake, alert, baseline neuro   Lungs: Clear to auscultation bilaterally, no crackles or wheezing   Cardiovascular: Regular rate and rhythm, normal S1 and S2, and no murmur noted   Abdomen: Normal bowel sounds, soft, non-distended, non-tender   Skin: No rashes, no cyanosis, no edema   Other:           Data:   All microbiology laboratory data reviewed.  Recent Labs   Lab Test  10/04/17   0610  10/03/17   2151  06/05/17   0640   WBC  17.6*  17.0*  12.4*   HGB  14.6  16.9  13.3   HCT  42.7  49.2  39.9*   MCV  83  85  73*   PLT  122*  175  161     Recent Labs   Lab Test  10/04/17   0610  10/03/17   2151 06/06/17   0710   CR  0.98  0.95  1.01     No lab results found.  Recent Labs   Lab Test  10/03/17   2205  10/03/17   2155  01/30/17   1453  01/23/17   1720  01/22/17   1430  01/22/17   0038  01/21/17   1608  01/21/17   1600  11/29/16   0836   CULT  No growth after 1 day  No growth after 1 day  Moderate growth Pseudomonas aeruginosa  Light growth Enterobacter cloacae complex  Moderate growth Candida albicans / dubliniensis Candida albicans and Candida   dubliniensis are not routinely speciated Susceptibility testing not routinely   done  Moderate growth Enterococcus faecium (VRE)  *  No anaerobes isolated  Canceled, Test credited Duplicate request PCR WAS ALREADY ORDERED  No anaerobes isolated  Heavy growth Enterobacter cloacae complex  Heavy growth Enterococcus faecium (VRE)  Critical Value/Significant Value called to and read back by Phylicia Lamar RN, @   5647 01.24.2017 BL  *  No growth  No growth  No growth  Light growth Normal jaime  Heavy growth Pseudomonas aeruginosa  *[SD1.1]          Revision History        User Key  Date/Time User Provider Type Action    > SD1.1 10/5/2017  8:42 AM Yoni Gaitan MD Physician Sign            Progress Notes by Steven Bowles RT at 10/5/2017 12:01 AM     Author:  Steven Bowles RT Service:  (none) Author Type:  Respiratory Therapist    Filed:  10/5/2017 12:01 AM Date of Service:  10/5/2017 12:01 AM Creation Time:  10/5/2017 12:01 AM    Status:  Signed :  Steven Bowles RT (Respiratory Therapist)         Patient refused to placed on CPAP unit and RT is following.    Steven Bowles RT  10/5/2017[SA1.1]       Revision History        User Key Date/Time User Provider Type Action    > SA1.1 10/5/2017 12:01 AM Steven Bowles RT Respiratory Therapist Sign            Progress Notes by Migel Stoddard MD at 10/4/2017  2:35 PM     Author:  Migel Stoddard MD Service:  Hospitalist Author Type:  Physician    Filed:  10/4/2017  2:39 PM Date of Service:  10/4/2017  2:35 PM Creation Time:  10/4/2017  2:35 PM    Status:  Signed :  Migel Stoddard MD (Physician)         [facility]    Hospitalist Progress Note    Assessment & Plan   Keyon Farias is a 57 year old male with past history TBI with right spastic hemiplegia, aphasia, dysphagia s/p G-tube, seizure disorder, panhypopit and V-tach/V-fib who presents with sepsis due to right middle and lower lobe pneumonia.     Severe sepsis due to right middle and lower lobe pneumonia  Acute hypoxic and hypercapnic respiratory failure  -Presents with fever (102), tachycardia (150's), tachypnea (40's), leukocytosis (17 with left shift) with -ABG demonstrating hypoxia and hypercapnea with CT chest showing right middle and lower lobe infiltrates.    -Lactate elevated at 10.  Given amikacin, linezolid and ciprofloxacin in ED given history of drug-resistant organisms (including VRE and Psuedomonas resistant to Zosyn and meropenem though these were isolated from pancreatic fluid).  -suspect this is most likely a typical  aspiration pneumonia given dysphagia and mother reporting she gives him 3-4 bites daily  -continue with cipro, meropenem and vancomycin  -Appreciate infectious disease consult  -Lactate improved  -stress dose steroids for 24 hours  -follow blood cultures  -Off BiPAP  -Transfer to floor      Hyponatremia  Admission Na 132, this is slightly improved from recent baseline.  -Improved     TBI with aphasia, dysphagia s/p G-tube, right spastic hemiplegia  Able to respond to simple questions.  Nonverbal at baseline  -Nutrition consult for nocturnal feedings  -SLP consult     Seizure disorder  Thought due to TBI.  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  -continue stress dose hydrocortisone and PTA evothyroxine      D/W: RN  DVT Prophylaxis: Pneumatic Compression Devices  Code Status: Full Code    Disposition: Expected discharge in 2 + days  Migel Stoddard MD    Interval History   Afebrile this morning.  Oxygen requirement improved to 2 L.     -Data reviewed today: I reviewed all new labs and imaging results over the last 24 hours. I personally reviewed no images or EKG's today.    Physical Exam   Temp: 98.2  F (36.8  C) Temp src: Axillary BP: 107/59 Pulse: 124 Heart Rate: 96 Resp: 29 SpO2: 93 % O2 Device: None (Room air) Oxygen Delivery: 2 LPM  Vitals:    10/04/17 0200   Weight: 76.5 kg (168 lb 10.4 oz)     Vital Signs with Ranges  Temp:  [98.2  F (36.8  C)-102  F (38.9  C)] 98.2  F (36.8  C)  Pulse:  [122-156] 124  Heart Rate:  [] 96  Resp:  [11-42] 29  BP: ()/(43-95) 107/59  FiO2 (%):  [50 %] 50 %  SpO2:  [91 %-100 %] 93 %  I/O last 3 completed shifts:  In: 1175 [I.V.:1175]  Out: 1600 [Urine:1600]    Constitutional: Awake, non-verbal  HEENT: Moist oral mucosa, no oral lesions, No pallor or icterus  Neck- Supple, Good ROM, No JVD  Respiratory:  Few crackles bilaterally; Normal WOB  Cardiovascular: RRR, No murmur  GI:  Soft, Non- tender, feeding tube in place  Skin/Integument: Warm and dry, no rashes  MSK: No joint deformity or swelling, no edema  Neuro: CN- grossly intact      Medications     NaCl 100 mL/hr at 10/04/17 1251       aspirin  81 mg Per J Tube Daily     Brivaracetam  100 mg Oral BID     carBAMazepine  150 mg Oral Q6H     fiber modular  1 packet Per Feeding Tube TID     levothyroxine  150 mcg Per J Tube QAM AC     melatonin  6 mg Per J Tube QPM     multivitamins with minerals  15 mL Per J Tube Daily     potassium & sodium phosphates  1 packet Oral BID     cholecalciferol  2,000 Units Per G Tube Daily     enoxaparin  40 mg Subcutaneous Q24H     pantoprazole  40 mg Per NG tube Daily     calcium carbonate  1,250 mg Oral TID w/meals     ciprofloxacin  400 mg Intravenous Q12H     meropenem  500 mg Intravenous Q6H     vancomycin (VANCOCIN) IV  1,500 mg Intravenous Q12H     hydrocortisone sodium succinate PF  50 mg Intravenous Q8H       Data     Recent Labs  Lab 10/04/17  0610 10/03/17  2151   WBC 17.6* 17.0*   HGB 14.6 16.9   MCV 83 85   * 175    132*   POTASSIUM 4.2 5.2   CHLORIDE 101 95   CO2 25 23   BUN 14 16   CR 0.98 0.95   ANIONGAP 9 14   STEVE 8.7 9.7   GLC 98 70   ALBUMIN  --  3.8   PROTTOTAL  --  9.2*   BILITOTAL  --  0.4   ALKPHOS  --  91   ALT  --  36   AST  --  43   LIPASE  --  299       Recent Results (from the past 24 hour(s))   CT Chest/Abdomen/Pelvis w Contrast    Narrative    CT CHEST/ABDOMEN/PELVIS W CONTRAST  10/3/2017 11:13 PM     HISTORY: Severe sepsis, history of necrotizing pancreatitis, possible  respiratory infection.    TECHNIQUE: Volumetric acquisition through chest, abdomen and pelvis  with IV contrast. 82 mL Isovue-370. Radiation dose for this scan was  reduced using automated exposure control, adjustment of the mA and/or  kV according to patient size, or iterative reconstruction technique.    COMPARISON: None.    FINDINGS:   Chest: Moderate hazy infiltrate in the right mid and lower  lung likely  due to pneumonia. Mild infiltrate or atelectasis at the left lung base  medially. No pleural effusions. Borderline prominent right  paratracheal and subcarinal lymph nodes. Heart size is within normal  limits.    Abdomen and pelvis: Liver, spleen, gallbladder, adrenal glands and  kidneys demonstrate no worrisome findings. Well-circumscribed 2.5 cm  rounded fluid pocket/cystic mass abutting the undersurface of the  pancreatic tail which is likely a pseudocyst. This is new since the  previous study and is at the site where there was previously a drain  in place communicating with the stomach. No significant peripancreatic  inflammatory change to suggest acute pancreatitis. No other fluid  collections. Percutaneous gastrostomy tube.    Lerma catheter in the bladder. Coiled opaque tube in the ascending  colon which should represent migration of the patient's  cystogastrostomy tube.      Impression    IMPRESSION:  1. Fairly extensive right mid and lower lung infiltrate likely  pneumonia. Small area of infiltrate or atelectasis left base medially.  2. Small cystic lesion abutting the pancreatic tail likely a  pseudocyst at the site of a previous drainage catheter. No  peripancreatic inflammatory change or other acute findings.  3. Opaque tubing in the right hemicolon which should represent  migration of the patient's cystogastrostomy tube.    Findings discussed with ER physician at 11:45 PM    PANFILO KING MD   Chest  XR, 1 view PORTABLE    Narrative    XR CHEST PORT 1 VW   10/3/2017 11:15 PM     INDICATION: Dyspnea.    COMPARISON: 6/4/2017.      Impression    IMPRESSION: Shallow inspiration. Mild infiltrate at the right base  better seen on the CT. There is also a small area of infiltrate or  atelectasis at the left base medially. Opaque tubing projecting over  the right colon.    PANFILO KING MD[NB1.1]        Revision History        User Key Date/Time User Provider Type Action    > NB1.1 10/4/2017   2:39 PM Migel Stoddard MD Physician Sign            Progress Notes by Shaneka Clark PA-C at 10/4/2017 11:45 AM     Author:  Shaneka Clark PA-C Service:  ICU Author Type:  Physician Assistant - MATTHEW    Filed:  10/4/2017 11:47 AM Date of Service:  10/4/2017 11:45 AM Creation Time:  10/4/2017 11:45 AM    Status:  Signed :  Shaneka Clark PA-C (Physician Assistant - C)         ICU Multi-Disciplinary Note  57 year old male with past history TBI with right spastic hemiplegia, aphasia, dysphagia s/p G-tube, seizure disorder, panhypopit and V-tach/V-fib who presents with sepsis due to right middle and lower lobe pneumonia. Remains hemodynamically stable, stable from respiratory status. LA down trending.  Patient condition reviewed and discussed while on multidisciplinary rounds today.   Please note these minor interventions that were initiated:  1. D/C home oral hydrocortisone as patient on stress dose IV hydrocortisone  The Critical Care service will continue to follow peripherally while patient is within the ICU. We are readily available should issues arise.  Please feel free to contact us for anything with which we may be of assistance.    Shaneka Clark[AS1.1]        Revision History        User Key Date/Time User Provider Type Action    > AS1.1 10/4/2017 11:47 AM Shaneka Clark PA-C Physician Assistant - MTATHEW Sign            ED Notes signed by Ayala Non-Provider at 10/4/2017  4:54 AM      Author:  Scan Non-Provider Service:  (none) Author Type:  (none)    Filed:  10/4/2017  4:54 AM Date of Service:  10/3/2017 10:30 PM Creation Time:  10/4/2017  4:54 AM    Status:  Signed :  Ayala Non-Provider     Scan on 10/4/2017  4:54 AM by Ayala Non-Provider : PERNELL [facility] DEPARTMENT 1          Revision History        User Key Date/Time User Provider Type Action    > [N/A] 10/4/2017  4:54 AM Scan, Non-Provider (none) Sign            Progress Notes by Mihri Catherine RT at 10/4/2017  4:03 AM     Author:  Mihir Catherine RT  Service:  Respiratory Therapy Author Type:  Respiratory Therapist    Filed:  10/4/2017  4:04 AM Date of Service:  10/4/2017  4:03 AM Creation Time:  10/4/2017  4:03 AM    Status:  Signed :  Mihir Catherine RT (Respiratory Therapist)         Pt stable on Bipap 12/5 50% all shift. BBS diminished. Total facemask in place. Will continue to follow.[TK1.1]     Mihir Catherine[TK1.2] RT[TK1.1]      Revision History        User Key Date/Time User Provider Type Action    > TK1.2 10/4/2017  4:04 AM Mihir Catherine, RT Respiratory Therapist Sign     TK1.1 10/4/2017  4:03 AM Mihir Catherine RT Respiratory Therapist             ED Provider Notes by Srikanth Ryan MD at 10/3/2017  9:38 PM     Author:  Srikanth Ryan MD Service:  Emergency Medicine Author Type:  Physician    Filed: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            Progress Notes by Mihir Catherine RT at 10/4/2017  4:03 AM     Author:  Mihir Catherine RT Service:  Respiratory Therapy Author Type:  Respiratory Therapist    Filed:  10/4/2017  4:04 AM Date of Service:  10/4/2017  4:03 AM Creation Time:  10/4/2017  4:03 AM    " Status:  Signed :  Mihir Catherine, RT (Respiratory Therapist)         Pt stable on Bipap 12/5 50% all shift. BBS diminished. Total facemask in place. Will continue to follow.[TK1.1]     Mihir Catherine[TK1.2] RT[TK1.1]      Revision History        User Key Date/Time User Provider Type Action    > TK1.2 10/4/2017  4:04 AM Mihir Catherine RT Respiratory Therapist Sign     TK1.1 10/4/2017  4:03 AM Mihir Catherine RT Respiratory Therapist

## 2017-10-03 NOTE — IP AVS SNAPSHOT
` [place] [address] [facility]: [phone]            Medication Administration Report for Keyon Farias as of 10/07/17 1908   Legend:    Given Hold Not Given Due Canceled Entry Other Actions    Time Time (Time) Time  Time-Action       Inactive    Active    Linked        Medications 10/01/17 10/02/17 10/03/17 10/04/17 10/05/17 10/06/17 10/07/17    0.9% sodium chloride BOLUS  Dose: 500 mL Freq: ONCE PRN Route: IV  PRN Reason: other  PRN Comment: IF hypotension or hemorrhage.  Start: 10/04/17 0108   Admin Instructions: Administer quickly to patient tolerance IF positioning and increased IV rate are ineffective in patient with massive hemorrhage and hypotension. Discontinue upon discharge from ICU.               acetaminophen (TYLENOL) solution 650 mg  Dose: 650 mg Freq: EVERY 4 HOURS PRN Route: PER FEEDING   PRN Comment: fever or mild pain  Start: 10/06/17 0807   Admin Instructions: Maximum acetaminophen dose from all sources= 75 mg/kg/day not to exceed 4 grams/day.               albuterol neb solution 2.5 mg  Dose: 2.5 mg Freq: EVERY 4 HOURS PRN Route: NEBULIZATION  PRN Reason: shortness of breath / dyspnea  Start: 10/04/17 0108              aspirin chewable tablet 81 mg  Dose: 81 mg Freq: DAILY Route: PER J TUBE  Start: 10/04/17 0900   Admin Instructions: Shake well.        0831 (81 mg)-Given        0801 (81 mg)-Given        0813 (81 mg)-Given        1001 (81 mg)-Given           bisacodyl (DULCOLAX) Suppository 10 mg  Dose: 10 mg Freq: DAILY PRN Route: RE  PRN Reason: constipation  Start: 10/04/17 0108   Admin Instructions: Hold for loose stools.  This is the third step of a three step constipation treatment protocol.               Brivaracetam (BRIVIACT) tablet 100 mg  Dose: 100 mg Freq: 2 TIMES DAILY Route: PO  Start: 10/04/17 0900   Admin Instructions: Swallow tablets whole.  Do not chew or crush.        0829 (100 mg)-Given       2043 (100 mg)-Given        0801 (100 mg)-Given       2027  (100 mg)-Given        0813 (100 mg)-Given       2008 (100 mg)-Given        1000 (100 mg)-Given       [ ] 2100           calcium carbonate suspension 1,250 mg  Dose: 1,250 mg Freq: 3 TIMES DAILY WITH MEALS Route: PO  Start: 10/07/17 1000   Admin Instructions: Shake Well           1000 (1,250 mg)-Given       1403 (1,250 mg)-Given       [ ] 1800           carBAMazepine (TEGretol) suspension 150 mg  Dose: 150 mg Freq: EVERY 8 HOURS SCHEDULED Route: PER FEEDING   Start: 10/07/17 0915   Admin Instructions: Shake well.           1000 (150 mg)-Given       1403 (150 mg)-Given       [ ] 2200           cholecalciferol (vitamin D) tablet 2,000 Units  Dose: 2,000 Units Freq: DAILY Route: PER G TUBE  Start: 10/04/17 0900       0830 (2,000 Units)-Given        0801 (2,000 Units)-Given        0813 (2,000 Units)-Given        1001 (2,000 Units)-Given           ciprofloxacin (CIPRO) tablet 500 mg  Dose: 500 mg Freq: EVERY 12 HOURS SCHEDULED Route: PER FEEDING   Indications of Use: HEALTHCARE-ASSOCIATED PNEUMONIA  Start: 10/07/17 0800   Admin Instructions: Administer at least 2 hours before or 4 hours after aluminum, calcium, iron, zinc or magnesium containing medications. May be taken with food or on an empty stomach.  Do not administer alone with a dairy product like milk or yogurt or calcium-fortified juice,  but may be administered with a meal containing dairy.<br>           1000 (500 mg)-Given       [ ] 2000           enoxaparin (LOVENOX) injection 40 mg  Dose: 40 mg Freq: EVERY 24 HOURS Route: SC  Start: 10/04/17 0900   Admin Instructions: HOLD if platelet count falls below 50% of baseline or less than 100,000/ L and notify provider.        0829 (40 mg)-Given        0802 (40 mg)-Given        0813 (40 mg)-Given        1001 (40 mg)-Given           fiber modular (NUTRISOURCE FIBER) packet 1 packet  Dose: 1 packet Freq: 3 TIMES DAILY Route: PER FEEDING   Start: 10/04/17 0900   Admin Instructions: Mix each packet with 60 mL water  before administering. SUPPLIED BY NUTRITION DEPARTMENT.        (1000)-Not Given       (1643)-Not Given       (2241)-Not Given        1149 (1 packet)-Given       1620 (1 packet)-Given       2203 (1 packet)-Given        0814 (1 packet)-Given       1651 (1 packet)-Given       2227 (1 packet)-Given               1008 (1 packet)-Given       [ ] 1600       [ ] 2200           hydrocortisone (CORTEF) tablet 10 mg  Dose: 10 mg Freq: EVERY EVENING Route: ORAL OR FEED  Start: 10/06/17 2000   Admin Instructions: Shake well.          2008 (10 mg)-Given        [ ] 2000           hydrocortisone (CORTEF) tablet 20 mg  Dose: 20 mg Freq: EVERY MORNING Route: PER J TUBE  Start: 10/06/17 0900   Admin Instructions: Shake well.          0813 (20 mg)-Given        1001 (20 mg)-Given           levothyroxine (SYNTHROID/LEVOTHROID) tablet 150 mcg  Dose: 150 mcg Freq: EVERY MORNING BEFORE BREAKFAST Route: PER J TUBE  Start: 10/04/17 0730   Admin Instructions: HOLD Tube feeding 1 hour before and 1 hour after each dose (Per family request)        0831 (150 mcg)-Given        0645 (150 mcg)-Given        0635 (150 mcg)-Given        0645 (150 mcg)-Given           magnesium sulfate 2 g in NS intermittent infusion ([facility] or [facility])  Dose: 2 g Freq: DAILY PRN Route: IV  PRN Reason: magnesium supplementation  Start: 10/04/17 0108   Admin Instructions: For Serum Mg++ 1.6 - 2 mg/dL  Give 2 g and recheck magnesium level next AM.        0953 (2 g)-New Bag        1701 (2 g)-New Bag         1109 (2 g)-New Bag           magnesium sulfate 4 g in 100 mL sterile water (premade)  Dose: 4 g Freq: EVERY 4 HOURS PRN Route: IV  PRN Reason: magnesium supplementation  Start: 10/04/17 0108   Admin Instructions: For serum Mg++ less than 1.6 mg/dL  Give 4 g and recheck magnesium level 2 hours after dose, and next AM.               melatonin tablet 6 mg  Dose: 6 mg Freq: EVERY EVENING Route: PER J TUBE  Start: 10/04/17 2000       2048 (6 mg)-Given        2027 (6  mg)-Given        2008 (6 mg)-Given        [ ] 2000           minocycline (MINOCIN/DYNACIN) capsule 100 mg  Dose: 100 mg Freq: EVERY 12 HOURS SCHEDULED Route: PO  Indications of Use: HEALTHCARE-ASSOCIATED PNEUMONIA  Start: 10/07/17 0800   Admin Instructions: Administer at least 2 hours before or after aluminum, calcium, iron, zinc or magnesium containing products.           1001 (100 mg)-Given       [ ] 2000           multivitamins with minerals (CERTAVITE/CEROVITE) liquid 15 mL  Dose: 15 mL Freq: DAILY Route: PER J TUBE  Start: 10/04/17 0900       0829 (15 mL)-Given        0802 (15 mL)-Given        0814 (15 mL)-Given        1000 (15 mL)-Given           naloxone (NARCAN) injection 0.1-0.4 mg  Dose: 0.1-0.4 mg Freq: EVERY 2 MIN PRN Route: IV  PRN Reason: opioid reversal  Start: 10/04/17 0108   Admin Instructions: For respiratory rate LESS than or EQUAL to 8.  Partial reversal dose:  0.1 mg titrated q 2 minutes for Analgesia Side Effects Monitoring Sedation Level of 3 (frequently drowsy, arousable, drifts to sleep during conversation).Full reversal dose:  0.4 mg bolus for Analgesia Side Effects Monitoring Sedation Level of 4 (somnolent, minimal or no response to stimulation).               ondansetron (ZOFRAN-ODT) ODT tab 4 mg  Dose: 4 mg Freq: EVERY 6 HOURS PRN Route: PO  PRN Reasons: nausea,vomiting  Start: 10/04/17 0108   Admin Instructions: This is Step 1 of nausea and vomiting management.  If nausea not resolved in 15 minutes, go to Step 2 prochlorperazine (COMPAZINE). Do not push through foil backing. Peel back foil and gently remove. Place on tongue immediately. Administration with liquid unnecessary              Or  ondansetron (ZOFRAN) injection 4 mg  Dose: 4 mg Freq: EVERY 6 HOURS PRN Route: IV  PRN Reasons: nausea,vomiting  Start: 10/04/17 0108   Admin Instructions: This is Step 1 of nausea and vomiting management.  If nausea not resolved in 15 minutes, go to Step 2 prochlorperazine (COMPAZINE).  Irritant.                pantoprazole (PROTONIX) suspension 40 mg  Dose: 40 mg Freq: DAILY Route: PER NG TUBE  Start: 10/04/17 0900       0830 (40 mg)-Given        0801 (40 mg)-Given        1140 (40 mg)-Given        1000 (40 mg)-Given           polyethylene glycol (MIRALAX/GLYCOLAX) Packet 17 g  Dose: 17 g Freq: DAILY PRN Route: PO/GT  PRN Reason: constipation  Start: 10/04/17 0108   Admin Instructions: Give in 8oz of  water, juice, or soda. Hold for loose stools.  This is the second step of a three step constipation treatment protocol.  1 Packet = 17 grams. Mixed prescribed dose in 8 ounces of water. Follow with 8 oz. of water.               potassium & sodium phosphates (NEUTRA-PHOS) Packet 1 packet  Dose: 1 packet Freq: 2 TIMES DAILY Route: PO  Start: 10/04/17 0900       0830 (1 packet)-Given       2050 (1 packet)-Given [C]        0801 (1 packet)-Given       2027 (1 packet)-Given        0813 (1 packet)-Given       2008 (1 packet)-Given        1001 (1 packet)-Given       [ ] 2100           potassium chloride (KLOR-CON) Packet 20-40 mEq  Dose: 20-40 mEq Freq: EVERY 2 HOURS PRN Route: ORAL OR FEED  PRN Reason: potassium supplementation  Start: 10/04/17 0108   Admin Instructions: Use if unable to tolerate tablets.    If Serum K+ 3.4-4.0, dose = 20 mEq x1. Recheck K+ level the next AM.  If Serum K+ 3.0-3.3, dose = 60 mEq po total dose (40 mEq x 1 followed in 2 hours by 20 mEq X1). Recheck K+ level 4 hours after dose and the next AM.  If Serum K+ 2.5-2.9, dose = 80 mEq po total dose (40 mEq Q2H x2). Recheck K+ level 4 hours after dose and the next AM.  If Serum K+ less than 2.5, See IV order.  Dissolve packet contents in 4-8 ounces of cold water or juice.           1001 (20 mEq)-Given           potassium chloride 10 mEq in 100 mL intermittent infusion  Dose: 10 mEq Freq: EVERY 1 HOUR PRN Route: IV  PRN Reason: potassium supplementation  Start: 10/04/17 0108   Admin Instructions: Infuse via PERIPHERAL LINE or CENTRAL LINE. Use for  central line replacement if patient weight less than 65 kg, if patient is on TPN with high potassium content or if unit does not stock 20 mEq bags.  If Serum K+ 3.4-4.0, dose = 10 mEq/hr x2 doses. Recheck K+ level the next AM.  If Serum K+ 3.0-3.3, dose = 10 mEq/hr x4 doses (40 mEq IV total dose). Recheck K+ level 2 hours after dose and the next AM.  If Serum K+ less than 3.0, dose = 10 mEq/hr x6 doses (60 mEq IV total dose). Recheck K+ level 2 hours after dose and the next AM.               potassium chloride 10 mEq in 100 mL intermittent infusion with 10 mg lidocaine  Dose: 10 mEq Freq: EVERY 1 HOUR PRN Route: IV  PRN Reason: potassium supplementation  Start: 10/04/17 0108   Admin Instructions: Infuse via PERIPHERAL LINE. Use potassium with lidocaine for pain with peripheral administration.  If Serum K+ 3.4-4.0, dose = 10 mEq/hr x2 doses. Recheck K+ level the next AM.  If Serum K+ 3.0-3.3, dose = 10 mEq/hr x4 doses (40 mEq IV total dose). Recheck K+ level 2 hours after dose and the next AM.  If Serum K+ less than 3.0, dose = 10 mEq/hr x6 doses (60 mEq IV total dose). Recheck K+ level 2 hours after dose and the next AM.               potassium chloride 20 mEq in 50 mL intermittent infusion  Dose: 20 mEq Freq: EVERY 1 HOUR PRN Route: IV  PRN Reason: potassium supplementation  Start: 10/04/17 0108   Admin Instructions: Infuse via CENTRAL LINE Only.  May need EKG if less than 65 kg or on TPN - Max rate is 0.3 mEq/kg/hr for patients not on EKG monitoring.    If Serum K+ 3.4-4.0, dose = 20 mEq/hr x1 doses. Recheck K+ level the next AM.  If Serum K+ 3.0-3.3, dose = 20 mEq/hr x2 doses (40 mEq IV total dose).  Recheck K+ level 2 hours after dose and the next AM.  If Serum K+ less than 3.0, dose = 20 mEq/hr x3 doses (60 mEq IV total dose). Recheck K+ level 2 hours after dose and the next AM.               potassium chloride SA (K-DUR/KLOR-CON M) CR tablet 20-40 mEq  Dose: 20-40 mEq Freq: EVERY 2 HOURS PRN Route: PO  PRN  Reason: potassium supplementation  Start: 10/04/17 0108   Admin Instructions: Use if able to take PO.   If Serum K+ 3.4-4.0, dose = 20 mEq x1. Recheck K+ level the next AM.  If Serum K+ 3.0-3.3, dose = 60 mEq po total dose (40 mEq x1 followed in 2 hours by 20 mEq x1). Recheck K+ level 4 hours after dose and the next AM.  If Serum K+ 2.5-2.9, dose = 80 mEq po total dose (40 mEq Q2H x2). Recheck K+ level 4 hours after dose and the next AM.  If Serum K+ less than 2.5, See IV order.  DO NOT CRUSH               prochlorperazine (COMPAZINE) injection 5-10 mg  Dose: 5-10 mg Freq: EVERY 6 HOURS PRN Route: IV  PRN Reasons: nausea,vomiting  Start: 10/04/17 0108   Admin Instructions: This is Step 2 of nausea and vomiting management.   If nausea not resolved in 15 minutes, give metoclopramide (REGLAN) if ordered (step 3 of nausea and vomiting management)              Or  prochlorperazine (COMPAZINE) tablet 5-10 mg  Dose: 5-10 mg Freq: EVERY 6 HOURS PRN Route: PO  PRN Reason: vomiting  Start: 10/04/17 0108   Admin Instructions: This is Step 2 of nausea and vomiting management.   If nausea not resolved in 15 minutes, give metoclopramide (REGLANI) if ordered (step 3 of nausea and vomiting management)              Or  prochlorperazine (COMPAZINE) Suppository 25 mg  Dose: 25 mg Freq: EVERY 12 HOURS PRN Route: RE  PRN Reasons: nausea,vomiting  Start: 10/04/17 0108   Admin Instructions: This is Step 2 of nausea and vomiting management.   If nausea not resolved in 15 minutes, give metoclopramide (REGLAN) if ordered (step 3 of nausea and vomiting management)               senna-docusate (SENOKOT-S;PERICOLACE) 8.6-50 MG per tablet 1-2 tablet  Dose: 1-2 tablet Freq: 2 TIMES DAILY PRN Route: PER G TUBE  PRN Comment: constipation   Start: 10/04/17 0108   Admin Instructions: If no bowel movement in 24 hours, increase to 2 tablets PO BID.  Hold for loose stools.   This is the first step of a three step constipation treatment protocol.                sodium chloride (PF) 0.9% PF flush 3 mL  Dose: 3 mL Freq: EVERY 8 HOURS Route: IK  Start: 10/04/17 2245   Admin Instructions: And Q1H PRN, to lock peripheral IV dormant line.        2241 (3 mL)-Given        0607 (3 mL)-Given       (1526)-Not Given       (2203)-Not Given        0633 (3 mL)-Given              2228 (3 mL)-Given        0645 (3 mL)-Given       [ ] 1445       [ ] 2245           sodium chloride (PF) 0.9% PF flush 3 mL  Dose: 3 mL Freq: EVERY 1 HOUR PRN Route: IK  PRN Reason: line flush  Start: 10/04/17 2240   Admin Instructions: for peripheral IV flush post IV meds          1448 (3 mL)-Given            sodium phosphate 10 mmol in D5W intermittent infusion  Dose: 10 mmol Freq: DAILY PRN Route: IV  PRN Reason: phosphorous supplementation  Start: 10/04/17 0108   Admin Instructions: For serum phosphorus level 2.5-2.7  Do not infuse Phosphorus in the same line as TPN.   Give 10 mmol and recheck phosphorus level next AM.               sodium phosphate 15 mmol in D5W intermittent infusion  Dose: 15 mmol Freq: DAILY PRN Route: IV  PRN Reason: phosphorous supplementation  Start: 10/04/17 0108   Admin Instructions: For serum phosphorus level 2.0-2.4  Do not infuse Phosphorus in the same line as TPN.   Give 15 mmol and recheck phosphorus level next AM.         1215 (15 mmol)-New Bag             sodium phosphate 20 mmol in D5W intermittent infusion  Dose: 20 mmol Freq: EVERY 6 HOURS PRN Route: IV  PRN Reason: phosphorous supplementation  PRN Comment: serum phosphorus level 1.1-1.9  Start: 10/04/17 0108   Admin Instructions: For serum phosphorus level 1.1-1.9  Do not infuse Phosphorus in the same line as TPN.   Give 20 mmol and recheck phosphorus level 2 hours after last dose and next AM.               sodium phosphate 25 mmol in D5W intermittent infusion  Dose: 25 mmol Freq: EVERY 8 HOURS PRN Route: IV  PRN Reason: phosphorous supplementation  Start: 10/04/17 0108   Admin Instructions: For serum phosphorus level  less than 1.1  Do not infuse Phosphorus in the same line as TPN.   Give 25 mmol and recheck phosphorus level 2 hours after last dose and next AM.              Completed Medications  Medications 10/01/17 10/02/17 10/03/17 10/04/17 10/05/17 10/06/17 10/07/17         Dose: 0.5 mL Freq: PRIOR TO DISCHARGE Route: IM  Start: 10/05/17 1000   End: 10/05/17 1057   Admin Instructions: Administer when afebrile (less than 100.4 F) x 24 hours, or Prior to Discharge. If not administering when scheduled , change the due time by following the instructions in the reference link below. If patient refuses vaccine, chart as Vaccine Refused.         1057 (0.5 mL)-Given            Discontinued Medications  Medications 10/01/17 10/02/17 10/03/17 10/04/17 10/05/17 10/06/17 10/07/17         Rate: 100 mL/hr Freq: CONTINUOUS Route: IV  Start: 10/04/17 0115   End: 10/05/17 0739       0212 ( )-New Bag       0846 ( )-New Bag       1251 ( )-Rate/Dose Change       1756 ( )-Rate/Dose Verify       1846 ( )-New Bag        0603 ( )-New Bag       0739-Med Discontinued           Dose: 650 mg Freq: EVERY 4 HOURS PRN Route: PER FEEDING   PRN Comment: fever or mild pain  Start: 10/04/17 0108   End: 10/06/17 0807   Admin Instructions: Maximum acetaminophen dose from all sources = 75 mg/kg/day not to exceed 4 grams/day.          0807-Med Discontinued          Dose: 1,250 mg Freq: 3 TIMES DAILY WITH MEALS Route: PO  Start: 10/04/17 0900   End: 10/07/17 0823   Admin Instructions: Shake Well        0829 (1,250 mg)-Given       1122 (1,250 mg)-Given       2040 (1,250 mg)-Given        0802 (1,250 mg)-Given       1321 (1,250 mg)-Given       1702 (1,250 mg)-Given        0813 (1,250 mg)-Given       1405 (1,250 mg)-Given       1838 (1,250 mg)-Given        0823-Med Discontinued         Dose: 150 mg Freq: EVERY 6 HOURS Route: PO  Start: 10/04/17 0200   End: 10/06/17 0917   Admin Instructions: Shake well.        0209 (150 mg)-Given       0831 (150 mg)-Given       1337  (150 mg)-Given       2042 (150 mg)-Given        0101 (150 mg)-Given       0642 (150 mg)-Given       1321 (150 mg)-Given       1923 (150 mg)-Given        0023 (150 mg)-Given       0654 (150 mg)-Given       0917-Med Discontinued          Dose: 400 mg Freq: EVERY 12 HOURS Route: IV  Indications of Use: SEPSIS  Last Dose: 400 mg (10/04/17 2243)  Start: 10/04/17 1000   End: 10/07/17 0738   Admin Instructions: Irritant.        0957 (400 mg)-New Bag       2243 (400 mg)-New Bag        1058 (400 mg)-New Bag       2234 (400 mg)-New Bag        1140 (400 mg)-New Bag       2227 (400 mg)-New Bag        0738-Med Discontinued         Dose: 10 mg Freq: EVERY EVENING Route: PO  Start: 10/05/17 2000   End: 10/06/17 0806   Admin Instructions: Shake well.         2027 (10 mg)-Given        0806-Med Discontinued          Dose: 50 mg Freq: EVERY 8 HOURS Route: IV  Start: 10/04/17 1630   End: 10/05/17 0950       1640 (50 mg)-New Bag       2355 (50 mg)-New Bag        0756 (50 mg)-New Bag       0950-Med Discontinued           Dose: 500 mg Freq: EVERY 6 HOURS Route: IV  Indications of Use: SEPSIS  Last Dose: 500 mg (10/07/17 0342)  Start: 10/04/17 0300   End: 10/07/17 0738       0209 (500 mg)-New Bag       0831 (500 mg)-New Bag       1511 (500 mg)-New Bag       2044 (500 mg)-New Bag        0257 (500 mg)-New Bag       0801 (500 mg)-New Bag       1619 (500 mg)-New Bag       2203 (500 mg)-New Bag        0315 (500 mg)-New Bag       0814 (500 mg)-New Bag       1448 (500 mg)-New Bag       2022 (500 mg)-New Bag        0342 (500 mg)-New Bag       0738-Med Discontinued         Dose: 20 mg Freq: 2 TIMES DAILY Route: PO  Start: 10/05/17 1000   End: 10/05/17 0951        0951-Med Discontinued           Dose: 1,500 mg Freq: EVERY 18 HOURS Route: IV  Indications of Use: SEPSIS  Last Dose: 1,500 mg (10/06/17 1838)  Start: 10/06/17 0000   End: 10/07/17 0738   Admin Instructions: For an adult with peripheral catheter and dose of 2-2.5 g, infuse over 2  hours.  [employer].          0022 (1,500 mg)-New Bag       1838 (1,500 mg)-New Bag        0738-Med Discontinued         Dose: 1,500 mg Freq: EVERY 12 HOURS Route: IV  Indications of Use: SEPSIS  Last Dose: 1,500 mg (10/05/17 0438)  Start: 10/04/17 0400   End: 10/05/17 1648   Admin Instructions: For an adult with peripheral catheter and dose of 2-2.5 g, infuse over 2 hours.  [employer]ant.        0420 (1,500 mg)-New Bag       1654 (1,500 mg)-New Bag       1757 (1,500 mg)-Rate/Dose Verify        0438 (1,500 mg)-New Bag              1648-Med Discontinued      Medications 10/01/17 10/02/17 10/03/17 10/04/17 10/05/17 10/06/17 10/07/17

## 2017-10-03 NOTE — IP AVS SNAPSHOT
` ` Patient Information     Patient Name Sex     Keyon Farias ([id number]) Male 1960       Room Bed    [facility] 6602-01      Patient Demographics     Address Phone    [address] [place] [address] [phone] (Home)  [phone] (Mobile) *Preferred*      Patient Ethnicity & Race     Ethnic Group Patient Race    American White      Emergency Contact(s)     Name Relation Home Work Mobile    Savannah Farias Mother [phone]      Documents on File        Status Date Received Description       Documents for the Patient    Privacy Notice - [place] Received 11     Insurance Card Received 14     Face Sheet Received () 07/27/10     External Medication Information Consent       Patient ID Received 14     Consent for Services - Hospital/Clinic Received () 11/08/10     Face Sheet Received () 10/27/10     Insurance Card Received 10/28/10     Consent for EHR Access  13 Copied from existing Consent for services - IP/ED collected on 2011    [facility] Specified Other       Insurance Card Received 13     Privacy Notice - [place] Received 13     Consent for EHR Access Received 13     Consent for Services - Hospital/Clinic Received () 13     HIM ZORAIDA Authorization  13     Consent for Services - Hospital/Clinic Received () 14     Insurance Card Received 03/08/15     Consent for Services - Hospital/Clinic Received () 09/25/15     Consent for Services/Privacy Notice - Hospital/Clinic Received () 16     HIM ZORAIDA Authorization  16 [facility]    HIM ZORAIDA Authorization  12/10/16 [facility]    Consent for Services/Privacy Notice - Hospital/Clinic Received 17     Consent for Services - [facility]       Speech Therapy Certification Received 17 Eval Only    Speech Therapy Certification Sent (Deleted) 14     Patient Photo   Photo of Patient       Documents for the Encounter     CMS IM for Patient Signature       EMS/Ambulance Record  10/04/17 [place] FIRE DEPARTMENT      Admission Information     Attending Provider Admitting Provider Admission Type Admission Date/Time    Manish Meier MD Bechard, Paul, MD Emergency 10/03/17  2142    Discharge Date Hospital Service Auth/Cert Status Service Area     Hospitalist Incomplete [facility] SERVICES    Unit Room/Bed Admission Status        66 MED SPEC UNIT 6602/6602-01 Admission (Confirmed)       Admission     Complaint    Sepsis (H)      Hospital Account     Name Acct ID Class Status Primary Coverage    Keyon Farias [id number] Inpatient Open MEDICARE - MEDICARE            Guarantor Account (for Hospital Account #[id number])     Name Relation to Pt Service Area Active? Acct Type    Keyon Farias  FCS Yes Personal/Family    Address Phone          [address] [facility] [place] [address] [phone](H)              Coverage Information (for Hospital Account #[id number])     1. MEDICARE/MEDICARE     F/O Payor/Plan Precert #    MEDICARE/MEDICARE     Subscriber Subscriber #    Keyon Farias [id number]    Address Phone    [employer] CLAIMS  [address]  [place] [address] [phone]          2. [place]     F[place]     Subscriber Subscriber #    Keyon Farias [id number]    Address Phone    [address] [address]  [place] [address] [phone]

## 2017-10-03 NOTE — IP AVS SNAPSHOT
"          [place] [address] MEDICAL SPECIALTY UNIT: [phone]                                              INTERAGENCY TRANSFER FORM - LAB / IMAGING / EKG / EMG RESULTS   10/3/2017                    Hospital Admission Date: 10/3/2017  BERT BARAJAS   : 1960  Sex: Male        Attending Provider: Manish Meier MD     Allergies:  Dilantin [Phenytoin Sodium], Valproic Acid    Infection:  MRSA, VRE   Service:  HOSPITALIST    Ht:  1.803 m (5' 11\")   Wt:  77.3 kg (170 lb 6.7 oz)   Admission Wt:  76.5 kg (168 lb 10.4 oz)    BMI:  23.77 kg/m 2   BSA:  1.97 m 2            Patient PCP Information     Provider PCP Type    [facility]         Lab Results - 3 Days      Carbamazepine total [[id number]]  Resulted: 10/07/17 0833, Result status: Final result    Ordering provider: Migel Stoddard MD  10/07/17 0000 Resulting lab: [facility]    Specimen Information    Type Source Collected On   Blood  10/07/17 0752          Components       Value Reference Range Flag Lab   Carbamazepine Level 8.6 4.0 - 12.0 mg/L  FrStHsLb            Basic metabolic panel [[id number]] (Abnormal)  Resulted: 10/07/17 0829, Result status: Final result    Ordering provider: Manish Meier MD  10/07/17 0000 Resulting lab: [facility]    Specimen Information    Type Source Collected On   Blood  10/07/17 0752          Components       Value Reference Range Flag Lab   Sodium 141 133 - 144 mmol/L  FrStHsLb   Potassium 3.9 3.4 - 5.3 mmol/L  FrStHsLb   Chloride 108 94 - 109 mmol/L  FrStHsLb   Carbon Dioxide 28 20 - 32 mmol/L  FrStHsLb   Anion Gap 5 3 - 14 mmol/L  FrStHsLb   Glucose 156 70 - 99 mg/dL H FrStHsLb   Urea Nitrogen 16 7 - 30 mg/dL  FrStHsLb   Creatinine 0.82 0.66 - 1.25 mg/dL  FrStHsLb   GFR Estimate >90 >60 mL/min/1.7m2  FrStHsLb   Comment:  Non  GFR Calc   GFR Estimate If Black >90 >60 mL/min/1.7m2  FrStHsLb   Comment:  African American GFR Calc   Calcium 8.1 8.5 - 10.1 " mg/dL L FrStHsLb            Magnesium [[id number]]  Resulted: 10/07/17 0829, Result status: Final result    Ordering provider: Manish Meier MD  10/07/17 0000 Resulting lab: [facility]    Specimen Information    Type Source Collected On   Blood  10/07/17 0752          Components       Value Reference Range Flag Lab   Magnesium 1.9 1.6 - 2.3 mg/dL  FrStHsLb            Phosphorus [[id number]]  Resulted: 10/07/17 0829, Result status: Final result    Ordering provider: Manish Meier MD  10/07/17 0000 Resulting lab: [facility]    Specimen Information    Type Source Collected On   Blood  10/07/17 0752          Components       Value Reference Range Flag Lab   Phosphorus 2.5 2.5 - 4.5 mg/dL  FrStHsLb            CBC with platelets [[id number]] (Abnormal)  Resulted: 10/07/17 0812, Result status: Final result    Ordering provider: Manish Meier MD  10/07/17 0000 Resulting lab: [facility]    Specimen Information    Type Source Collected On   Blood  10/07/17 0752          Components       Value Reference Range Flag Lab   WBC 5.4 4.0 - 11.0 10e9/L  FrStHsLb   RBC Count 4.38 4.4 - 5.9 10e12/L L FrStHsLb   Hemoglobin 12.4 13.3 - 17.7 g/dL L FrStHsLb   Hematocrit 37.0 40.0 - 53.0 % L FrStHsLb   MCV 85 78 - 100 fl  FrStHsLb   MCH 28.3 26.5 - 33.0 pg  FrStHsLb   MCHC 33.5 31.5 - 36.5 g/dL  FrStHsLb   RDW 17.8 10.0 - 15.0 % H FrStHsLb   Platelet Count 129 150 - 450 10e9/L L FrStHsLb            Blood culture ONE site [[id number]]  Resulted: 10/07/17 0805, Result status: Preliminary result    Ordering provider: Kenneth Estrada MD  10/03/17 2150 Resulting lab: [facility]    Specimen Information    Type Source Collected On   Blood Arm, Left 10/03/17 2155   Comment:  Left Arm          Components       Value Reference Range Flag Lab   Specimen Description Blood Left Arm      Special Requests Aerobic and anaerobic bottles received   [id number]   Culture Micro  No growth after 3 days   75            Blood culture [[id number]]  Resulted: 10/07/17 0805, Result status: Preliminary result    Ordering provider: Srikanth Ryan MD  10/03/17 2217 Resulting lab: [facility]    Specimen Information    Type Source Collected On   Blood Arm, Left 10/03/17 2205   Comment:  Left Arm          Components       Value Reference Range Flag Lab   Specimen Description Blood Left Arm      Special Requests Aerobic and anaerobic bottles received   [id number]   Culture Micro No growth after 3 days   75            Glucose by meter [[id number]] (Abnormal)  Resulted: 10/06/17 1331, Result status: Final result    Ordering provider: Manish Meier MD  10/06/17 1323 Resulting lab: POINT OF CARE TEST, GLUCOSE    Specimen Information    Type Source Collected On     10/06/17 1323          Components       Value Reference Range Flag Lab   Glucose 172 70 - 99 mg/dL H 170            Basic metabolic panel [[id number]] (Abnormal)  Resulted: 10/06/17 0907, Result status: Edited Result - FINAL    Ordering provider: Manish Meier MD  10/06/17 0000 Resulting lab: [facility]    Specimen Information    Type Source Collected On   Blood  10/06/17 0825          Components       Value Reference Range Flag Lab   Sodium 143 133 - 144 mmol/L  FrStHsLb   Potassium 3.8 3.4 - 5.3 mmol/L  FrStHsLb   Chloride 110 94 - 109 mmol/L H FrStHsLb   Carbon Dioxide 27 20 - 32 mmol/L  FrStHsLb   Anion Gap 6 3 - 14 mmol/L  FrStHsLb   Glucose 158 70 - 99 mg/dL H FrStHsLb   Urea Nitrogen 15 7 - 30 mg/dL  FrStHsLb   Creatinine 0.85 0.66 - 1.25 mg/dL  FrStHsLb   GFR Estimate >90 >60 mL/min/1.7m2  FrStHsLb   Comment:         CORRECTED ON 10/06 AT 0906: PREVIOUSLY REPORTED AS >90 Non    GFR Calc     GFR Estimate If Black >90 >60 mL/min/1.7m2  FrStHsLb   Comment:         CORRECTED ON 10/06 AT 0906: PREVIOUSLY REPORTED AS >90  GFR   Calc     Calcium 8.3 8.5 - 10.1 mg/dL L  FrStHsLb            Carbamazepine total [[id number]] (Abnormal)  Resulted: 10/06/17 0907, Result status: Edited Result - FINAL    Ordering provider: Migel Stoddard MD  10/06/17 0000 Resulting lab: [facility]    Specimen Information    Type Source Collected On   Blood  10/06/17 0825          Components       Value Reference Range Flag Lab   Carbamazepine Level 16.0 4.0 - 12.0 mg/L HH FrStHsLb   Comment:         Critical Value called to and read back by  JACQUIE VITAL ON STA 66 AT 0906. AC              Magnesium [[id number]]  Resulted: 10/06/17 0902, Result status: Final result    Ordering provider: Manish Meier MD  10/06/17 0000 Resulting lab: [facility]    Specimen Information    Type Source Collected On   Blood  10/06/17 0825          Components       Value Reference Range Flag Lab   Magnesium 2.3 1.6 - 2.3 mg/dL  FrStHsLb            Phosphorus [[id number]]  Resulted: 10/06/17 0902, Result status: Final result    Ordering provider: Manish Meier MD  10/06/17 0000 Resulting lab: [facility]    Specimen Information    Type Source Collected On   Blood  10/06/17 0825          Components       Value Reference Range Flag Lab   Phosphorus 3.2 2.5 - 4.5 mg/dL  FrStHsLb            CBC with platelets [[id number]] (Abnormal)  Resulted: 10/06/17 0841, Result status: Final result    Ordering provider: Manish Meier MD  10/06/17 0000 Resulting lab: [facility]    Specimen Information    Type Source Collected On   Blood  10/06/17 0825          Components       Value Reference Range Flag Lab   WBC 6.2 4.0 - 11.0 10e9/L  FrStHsLb   RBC Count 4.39 4.4 - 5.9 10e12/L L FrStHsLb   Hemoglobin 12.3 13.3 - 17.7 g/dL L FrStHsLb   Hematocrit 37.6 40.0 - 53.0 % L FrStHsLb   MCV 86 78 - 100 fl  FrStHsLb   MCH 28.0 26.5 - 33.0 pg  FrStHsLb   MCHC 32.7 31.5 - 36.5 g/dL  FrStHsLb   RDW 18.3 10.0 - 15.0 % H FrStHsLb   Platelet Count 120 150 - 450 10e9/L L FrStHsLb             Glucose by meter [[id number]] (Abnormal)  Resulted: 10/05/17 1757, Result status: Final result    Ordering provider: Srikanth Ryan MD  10/05/17 1752 Resulting lab: POINT OF CARE TEST, GLUCOSE    Specimen Information    Type Source Collected On     10/05/17 1752          Components       Value Reference Range Flag Lab   Glucose 110 70 - 99 mg/dL H 170            Vancomycin level [[id number]]  Resulted: 10/05/17 1621, Result status: Final result    Ordering provider: Manish Meier MD  10/05/17 0900 Resulting lab: [facility]    Specimen Information    Type Source Collected On   Blood  10/05/17 1515          Components       Value Reference Range Flag Lab   Vancomycin Level 21.0 mg/L  FrStHsLb   Comment:         Traditional Dosing therapeutic Range:         Trough 8-20 mg/L         Peak 20-50 mg/L              Carbamazepine total [[id number]] (Abnormal)  Resulted: 10/05/17 1101, Result status: Final result    Ordering provider: Manish Meier MD  10/05/17 1003 Resulting lab: [facility]    Specimen Information    Type Source Collected On     10/05/17 1030          Components       Value Reference Range Flag Lab   Carbamazepine Level 14.2 4.0 - 12.0 mg/L H FrStHsLb            Carbamazepine total [[id number]]  Resulted: 10/05/17 1001, Result status: In process    Ordering provider: Manish Meier MD  10/05/17 0916 Resulting lab: [facility]    Specimen Information    Type Source Collected On     10/05/17 0815            CBC with platelets [[id number]] (Abnormal)  Resulted: 10/05/17 0938, Result status: Final result    Ordering provider: Manish Meier MD  10/05/17 0001 Resulting lab: [facility]    Specimen Information    Type Source Collected On   Blood  10/05/17 0815          Components       Value Reference Range Flag Lab   WBC 13.6 4.0 - 11.0 10e9/L H FrStHsLb   RBC Count 4.60 4.4 - 5.9 10e12/L  FrStHsLb   Hemoglobin 12.9 13.3 - 17.7 g/dL L FrStHsLb   Hematocrit 39.3 40.0 -  53.0 % L FrStHsLb   MCV 85 78 - 100 fl  FrStHsLb   MCH 28.0 26.5 - 33.0 pg  FrStHsLb   MCHC 32.8 31.5 - 36.5 g/dL  FrStHsLb   RDW 18.3 10.0 - 15.0 % H FrStHsLb   Platelet Count 111 150 - 450 10e9/L L FrStHsLb            Magnesium [[id number]]  Resulted: 10/05/17 0929, Result status: Final result    Ordering provider: Manish Meier MD  10/05/17 0001 Resulting lab: [facility]    Specimen Information    Type Source Collected On   Blood  10/05/17 0815          Components       Value Reference Range Flag Lab   Magnesium 2.0 1.6 - 2.3 mg/dL  FrStHsLb            Phosphorus [[id number]] (Abnormal)  Resulted: 10/05/17 0929, Result status: Final result    Ordering provider: Manish Meier MD  10/05/17 0001 Resulting lab: [facility]    Specimen Information    Type Source Collected On   Blood  10/05/17 0815          Components       Value Reference Range Flag Lab   Phosphorus 2.0 2.5 - 4.5 mg/dL L FrStHsLb            Basic metabolic panel [[id number]] (Abnormal)  Resulted: 10/05/17 0929, Result status: Final result    Ordering provider: Manish Meier MD  10/05/17 0001 Resulting lab: [facility]    Specimen Information    Type Source Collected On   Blood  10/05/17 0815          Components       Value Reference Range Flag Lab   Sodium 146 133 - 144 mmol/L H FrStHsLb   Potassium 4.2 3.4 - 5.3 mmol/L  FrStHsLb   Chloride 113 94 - 109 mmol/L H FrStHsLb   Carbon Dioxide 26 20 - 32 mmol/L  FrStHsLb   Anion Gap 7 3 - 14 mmol/L  FrStHsLb   Glucose 140 70 - 99 mg/dL H FrStHsLb   Urea Nitrogen 14 7 - 30 mg/dL  FrStHsLb   Creatinine 0.84 0.66 - 1.25 mg/dL  FrStHsLb   GFR Estimate >90 >60 mL/min/1.7m2  FrStHsLb   Comment:  Non  GFR Calc   GFR Estimate If Black >90 >60 mL/min/1.7m2  FrStHsLb   Comment:  African American GFR Calc   Calcium 8.3 8.5 - 10.1 mg/dL L FrStHsLb            Carbamazepine total [[id number]]  Resulted: 10/05/17 0916, Result status: Final result    Ordering  provider: Migel Stoddard MD  10/05/17 0001 Resulting lab: [facility]    Specimen Information    Type Source Collected On   Blood  10/05/17 0815          Components       Value Reference Range Flag Lab   Carbamazepine Level Canceled, Test credited 4.0 - 12.0 mg/L  FrStHsLb   Comment:  Unsatisfactory specimen - collected in wrong container            Lactic acid level STAT [[id number]] (Abnormal)  Resulted: 10/05/17 0040, Result status: Final result    Ordering provider: Migel Stoddard MD  10/04/17 2359 Resulting lab: [facility]    Specimen Information    Type Source Collected On   Blood  10/05/17 0025          Components       Value Reference Range Flag Lab   Lactic Acid 2.1 0.7 - 2.0 mmol/L H FrStHsLb            Methicillin Resistant Staph Aureus PCR [[id number]] (Abnormal)  Resulted: 10/04/17 1222, Result status: Edited Result - FINAL    Ordering provider: Manish Meier MD  10/04/17 0108 Resulting lab: [facility]    Specimen Information    Type Source Collected On   Nares  10/04/17 0600          Components       Value Reference Range Flag Lab   Specimen Description Nares   FrStHsLb   S Aur Meth Resis PCR Positive NEG^Negative A 75   Comment:         MRSA Positive: SA Positive  MRSA and Staphylococcus aureus target DNA   detected, presumed positive for MRSA and SA colonization. A positive test does   not necessarily indicate the presence of viable organisms. It is,however,   presumptive for the presence of MRSA or SA. FDA approved assay performed using   [employer] GeneXpert(R) real-time PCR.  Critical Value/Significant Value called to and read back by  Laine Martinez R.N. on [facility] at 12:21pm on 10.04.17 JT.              Basic metabolic panel [[id number]]  Resulted: 10/04/17 0639, Result status: Final result    Ordering provider: Manish Meier MD  10/04/17 0108 Resulting lab: [facility]    Specimen Information    Type Source  Collected On   Blood  10/04/17 0610          Components       Value Reference Range Flag Lab   Sodium 135 133 - 144 mmol/L  FrStHsLb   Potassium 4.2 3.4 - 5.3 mmol/L  FrStHsLb   Chloride 101 94 - 109 mmol/L  FrStHsLb   Carbon Dioxide 25 20 - 32 mmol/L  FrStHsLb   Anion Gap 9 3 - 14 mmol/L  FrStHsLb   Glucose 98 70 - 99 mg/dL  FrStHsLb   Urea Nitrogen 14 7 - 30 mg/dL  FrStHsLb   Creatinine 0.98 0.66 - 1.25 mg/dL  FrStHsLb   GFR Estimate 79 >60 mL/min/1.7m2  FrStHsLb   Comment:  Non  GFR Calc   GFR Estimate If Black >90 >60 mL/min/1.7m2  FrStHsLb   Comment:  African American GFR Calc   Calcium 8.7 8.5 - 10.1 mg/dL  FrStHsLb            Magnesium [[id number]]  Resulted: 10/04/17 0639, Result status: Final result    Ordering provider: Manish Meier MD  10/04/17 0108 Resulting lab: [facility]    Specimen Information    Type Source Collected On   Blood  10/04/17 0610          Components       Value Reference Range Flag Lab   Magnesium 1.6 1.6 - 2.3 mg/dL  FrStHsLb            Phosphorus [[id number]]  Resulted: 10/04/17 0639, Result status: Final result    Ordering provider: Manish Meier MD  10/04/17 0108 Resulting lab: [facility]    Specimen Information    Type Source Collected On   Blood  10/04/17 0610          Components       Value Reference Range Flag Lab   Phosphorus 2.8 2.5 - 4.5 mg/dL  FrStHsLb            CBC with platelets [[id number]] (Abnormal)  Resulted: 10/04/17 0632, Result status: Final result    Ordering provider: Manish Meier MD  10/04/17 0108 Resulting lab: [facility]    Specimen Information    Type Source Collected On   Blood  10/04/17 0610          Components       Value Reference Range Flag Lab   WBC 17.6 4.0 - 11.0 10e9/L H FrStHsLb   RBC Count 5.15 4.4 - 5.9 10e12/L  FrStHsLb   Hemoglobin 14.6 13.3 - 17.7 g/dL  FrStHsLb   Hematocrit 42.7 40.0 - 53.0 %  FrStHsLb   MCV 83 78 - 100 fl  FrStHsLb   MCH 28.3 26.5 - 33.0 pg  FrStHsLb   MCHC  34.2 31.5 - 36.5 g/dL  FrStHsLb   RDW 17.2 10.0 - 15.0 % H FrStHsLb   Platelet Count 122 150 - 450 10e9/L L FrStHsLb            Lactic acid whole blood [[id number]] (Abnormal)  Resulted: 10/04/17 0627, Result status: Final result    Ordering provider: Manish Meier MD  10/04/17 0515 Resulting lab: [facility]    Specimen Information    Type Source Collected On   Blood  10/04/17 0610          Components       Value Reference Range Flag Lab   Lactic Acid 3.1 0.7 - 2.0 mmol/L H FrStHsLb            Lactic acid whole blood [[id number]] (Abnormal)  Resulted: 10/04/17 0147, Result status: Final result    Ordering provider: Manish Meier MD  10/04/17 0108 Resulting lab: [facility]    Specimen Information    Type Source Collected On   Blood  10/04/17 0135          Components       Value Reference Range Flag Lab   Lactic Acid 3.5 0.7 - 2.0 mmol/L H FrStHsLb            Testing Performed By     Lab - Abbreviation Name Director Address Valid Date Range    14 - FrStHsLb [facility] Unknown [address] [place] [address] 05/08/15 1057 - Present    45 - [id number] [facility] Unknown Unknown 01/28/02 0000 - Present    75 - Unknown [facility] Unknown [address] [facility] [address] 01/15/15 1019 - Present    170 - Unknown POINT OF CARE TEST, GLUCOSE Unknown Unknown 10/31/11 1114 - Present    28[address] - Unknown [facility] Unknown [address] [place] [address] 07/23/15 1146 - Present            Unresulted Labs (24h ago through future)    Start       Ordered    10/09/17 0600  Magnesium  EVERY MONDAY,   Routine     Comments:  Every Monday while on enteral tube feeding.    10/04/17 1443    10/09/17 0600  Phosphorus  EVERY MONDAY,   Routine     Comments:  Every Monday while on enteral tube feeding.    10/04/17 1443    10/09/17 0600  Prealbumin  EVERY MONDAY,   Routine     Comments:  Every Monday while on enteral tube feeding.    10/04/17  "1443    10/07/17 0600  Platelet count  (Pharmacological Prophylaxis - enoxaparin (LOVENOX) *Use only if creatinine clearance is greater than 30 mL/min)  EVERY THREE DAYS,   Routine     Comments:  Repeat every 3 days while on VTE prophylaxis.  Notify provider and hold enoxaparin if platelet count falls by 50% of baseline. If no result is listed, this lab has not been done the past 365 days. LATEST LAB RESULT: Platelet Count (10e9/L)       Date                     Value                 10/03/2017               175              ----------    10/04/17 0108    10/07/17 0000  Creatinine  (Pharmacological Prophylaxis - enoxaparin (LOVENOX) *Use only if creatinine clearance is greater than 30 mL/min)  EVERY THREE DAYS,   Routine     Comments:  Repeat every 3 days while on VTE prophylaxis.    10/04/17 0108    10/04/17 0600  CBC with platelets  DAILY,   Routine     Comments:  Last Lab Result: Hemoglobin (g/dL)       Date                     Value                 10/03/2017               16.9             ----------    10/04/17 0108    10/04/17 0600  Basic metabolic panel  DAILY,   Routine      10/04/17 0108    10/04/17 0600  Magnesium  DAILY,   Routine      10/04/17 0108    10/04/17 0600  Phosphorus  DAILY,   Routine      10/04/17 0108    Unscheduled  Potassium  (Potassium Replacement - \"High\" - Replacement for all levels less than 4.1 mmol/L - UU,UR,UA,RH,SH,PH,WY )  CONDITIONAL (SPECIFY),   Routine     Comments:  Obtain Potassium Level for these conditions:  *IF no potassium result within 24 hrs before initiation of order set, draw potassium level with next lab collect.    *2 HOURS AFTER last IV potassium replacement dose and 4 hours after an oral replacement dose when potassium replacement given for level less than 3.4.  *Next morning after potassium dose.     Repeat Potassium Replacement if necessary.    10/04/17 0108    Unscheduled  Magnesium  (Magnesium Replacement - Adult - \"High\" - Replacement for all levels less than " "or equal to 2 mg/dL)  CONDITIONAL (SPECIFY),   Routine     Comments:  Obtain Magnesium Level for these conditions:  *IF no magnesium result within 24 hrs before initiation of order set, draw magnesium level with next lab collect.    *2 HOURS AFTER last magnesium replacement dose when magnesium replacement given for level less than 1.6  *Next morning after magnesium dose.     Repeat Magnesium Replacement if necessary.    10/04/17 0108    Unscheduled  Phosphorus  (or    SODIUM Phosphate - \"High\" - Replacement for all levels less than 2.8 mg/dL)  CONDITIONAL (SPECIFY),   Routine     Comments:  Obtain Phosphorus Level for these conditions:  *IF no phosphorus result within 24 hrs before initiation of order set, draw phosphorus level with next lab collect.    *2 HOURS AFTER last phosphorus replacement dose when phosphorus replacement given for level less than 2.0  *Next morning after phosphorus dose.     Repeat Phosphorus Replacement if necessary.    10/04/17 0108      Encounter-Level Documents:     There are no encounter-level documents.      Order-Level Documents:     There are no order-level documents.      "

## 2017-10-04 LAB
ANION GAP SERPL CALCULATED.3IONS-SCNC: 9 MMOL/L (ref 3–14)
BUN SERPL-MCNC: 14 MG/DL (ref 7–30)
CALCIUM SERPL-MCNC: 8.7 MG/DL (ref 8.5–10.1)
CHLORIDE SERPL-SCNC: 101 MMOL/L (ref 94–109)
CO2 SERPL-SCNC: 25 MMOL/L (ref 20–32)
CREAT SERPL-MCNC: 0.98 MG/DL (ref 0.66–1.25)
ERYTHROCYTE [DISTWIDTH] IN BLOOD BY AUTOMATED COUNT: 17.2 % (ref 10–15)
GFR SERPL CREATININE-BSD FRML MDRD: 79 ML/MIN/1.7M2
GLUCOSE BLDC GLUCOMTR-MCNC: 95 MG/DL (ref 70–99)
GLUCOSE SERPL-MCNC: 98 MG/DL (ref 70–99)
HCT VFR BLD AUTO: 42.7 % (ref 40–53)
HGB BLD-MCNC: 14.6 G/DL (ref 13.3–17.7)
LACTATE BLD-SCNC: 3.1 MMOL/L (ref 0.7–2)
LACTATE BLD-SCNC: 3.5 MMOL/L (ref 0.7–2)
MAGNESIUM SERPL-MCNC: 1.6 MG/DL (ref 1.6–2.3)
MCH RBC QN AUTO: 28.3 PG (ref 26.5–33)
MCHC RBC AUTO-ENTMCNC: 34.2 G/DL (ref 31.5–36.5)
MCV RBC AUTO: 83 FL (ref 78–100)
MRSA DNA SPEC QL NAA+PROBE: POSITIVE
PHOSPHATE SERPL-MCNC: 2.8 MG/DL (ref 2.5–4.5)
PLATELET # BLD AUTO: 122 10E9/L (ref 150–450)
POTASSIUM SERPL-SCNC: 4.2 MMOL/L (ref 3.4–5.3)
RBC # BLD AUTO: 5.15 10E12/L (ref 4.4–5.9)
SODIUM SERPL-SCNC: 135 MMOL/L (ref 133–144)
SPECIMEN SOURCE: ABNORMAL
WBC # BLD AUTO: 17.6 10E9/L (ref 4–11)

## 2017-10-04 PROCEDURE — 40000281 ZZH STATISTIC TRANSPORT TIME EA 15 MIN

## 2017-10-04 PROCEDURE — 25000128 H RX IP 250 OP 636: Performed by: INTERNAL MEDICINE

## 2017-10-04 PROCEDURE — 25000132 ZZH RX MED GY IP 250 OP 250 PS 637: Mod: GY | Performed by: HOSPITALIST

## 2017-10-04 PROCEDURE — 99207 ZZC APP CREDIT; MD BILLING SHARED VISIT: CPT | Performed by: INTERNAL MEDICINE

## 2017-10-04 PROCEDURE — 83605 ASSAY OF LACTIC ACID: CPT | Performed by: HOSPITALIST

## 2017-10-04 PROCEDURE — 27210429 ZZH NUTRITION PRODUCT INTERMEDIATE LITER

## 2017-10-04 PROCEDURE — 83735 ASSAY OF MAGNESIUM: CPT | Performed by: HOSPITALIST

## 2017-10-04 PROCEDURE — 80048 BASIC METABOLIC PNL TOTAL CA: CPT | Performed by: HOSPITALIST

## 2017-10-04 PROCEDURE — A9270 NON-COVERED ITEM OR SERVICE: HCPCS | Mod: GY | Performed by: HOSPITALIST

## 2017-10-04 PROCEDURE — 25000125 ZZHC RX 250: Performed by: HOSPITALIST

## 2017-10-04 PROCEDURE — 25000128 H RX IP 250 OP 636: Performed by: EMERGENCY MEDICINE

## 2017-10-04 PROCEDURE — 94660 CPAP INITIATION&MGMT: CPT

## 2017-10-04 PROCEDURE — 12000000 ZZH R&B MED SURG/OB

## 2017-10-04 PROCEDURE — 85027 COMPLETE CBC AUTOMATED: CPT | Performed by: HOSPITALIST

## 2017-10-04 PROCEDURE — 99223 1ST HOSP IP/OBS HIGH 75: CPT | Mod: AI | Performed by: HOSPITALIST

## 2017-10-04 PROCEDURE — 00000146 ZZHCL STATISTIC GLUCOSE BY METER IP

## 2017-10-04 PROCEDURE — 25000128 H RX IP 250 OP 636: Performed by: HOSPITALIST

## 2017-10-04 PROCEDURE — 36415 COLL VENOUS BLD VENIPUNCTURE: CPT | Performed by: HOSPITALIST

## 2017-10-04 PROCEDURE — 84100 ASSAY OF PHOSPHORUS: CPT | Performed by: HOSPITALIST

## 2017-10-04 PROCEDURE — 87641 MR-STAPH DNA AMP PROBE: CPT | Performed by: HOSPITALIST

## 2017-10-04 PROCEDURE — 40000275 ZZH STATISTIC RCP TIME EA 10 MIN

## 2017-10-04 PROCEDURE — 87640 STAPH A DNA AMP PROBE: CPT | Performed by: HOSPITALIST

## 2017-10-04 RX ORDER — AMOXICILLIN 250 MG
1-2 CAPSULE ORAL 2 TIMES DAILY PRN
Status: DISCONTINUED | OUTPATIENT
Start: 2017-10-04 | End: 2017-10-07 | Stop reason: HOSPADM

## 2017-10-04 RX ORDER — ACETAMINOPHEN 325 MG/1
650 TABLET ORAL EVERY 4 HOURS PRN
Status: DISCONTINUED | OUTPATIENT
Start: 2017-10-04 | End: 2017-10-06

## 2017-10-04 RX ORDER — GUAR GUM
1 PACKET (EA) ORAL 3 TIMES DAILY
Status: DISCONTINUED | OUTPATIENT
Start: 2017-10-04 | End: 2017-10-07 | Stop reason: HOSPADM

## 2017-10-04 RX ORDER — POTASSIUM CHLORIDE 1.5 G/1.58G
20-40 POWDER, FOR SOLUTION ORAL
Status: DISCONTINUED | OUTPATIENT
Start: 2017-10-04 | End: 2017-10-07 | Stop reason: HOSPADM

## 2017-10-04 RX ORDER — POTASSIUM CHLORIDE 7.45 MG/ML
10 INJECTION INTRAVENOUS
Status: DISCONTINUED | OUTPATIENT
Start: 2017-10-04 | End: 2017-10-07 | Stop reason: HOSPADM

## 2017-10-04 RX ORDER — PROCHLORPERAZINE 25 MG
25 SUPPOSITORY, RECTAL RECTAL EVERY 12 HOURS PRN
Status: DISCONTINUED | OUTPATIENT
Start: 2017-10-04 | End: 2017-10-07 | Stop reason: HOSPADM

## 2017-10-04 RX ORDER — BISACODYL 10 MG
10 SUPPOSITORY, RECTAL RECTAL DAILY PRN
Status: DISCONTINUED | OUTPATIENT
Start: 2017-10-04 | End: 2017-10-07 | Stop reason: HOSPADM

## 2017-10-04 RX ORDER — MEROPENEM 500 MG/1
500 INJECTION, POWDER, FOR SOLUTION INTRAVENOUS EVERY 6 HOURS
Status: DISCONTINUED | OUTPATIENT
Start: 2017-10-04 | End: 2017-10-07

## 2017-10-04 RX ORDER — MAGNESIUM SULFATE HEPTAHYDRATE 40 MG/ML
4 INJECTION, SOLUTION INTRAVENOUS EVERY 4 HOURS PRN
Status: DISCONTINUED | OUTPATIENT
Start: 2017-10-04 | End: 2017-10-07 | Stop reason: HOSPADM

## 2017-10-04 RX ORDER — HYDROCORTISONE 20 MG/1
20 TABLET ORAL EVERY MORNING
Status: DISCONTINUED | OUTPATIENT
Start: 2017-10-04 | End: 2017-10-04

## 2017-10-04 RX ORDER — HYDROCORTISONE 10 MG/1
10 TABLET ORAL EVERY EVENING
Status: DISCONTINUED | OUTPATIENT
Start: 2017-10-04 | End: 2017-10-04

## 2017-10-04 RX ORDER — ONDANSETRON 2 MG/ML
4 INJECTION INTRAMUSCULAR; INTRAVENOUS EVERY 6 HOURS PRN
Status: DISCONTINUED | OUTPATIENT
Start: 2017-10-04 | End: 2017-10-07 | Stop reason: HOSPADM

## 2017-10-04 RX ORDER — NALOXONE HYDROCHLORIDE 0.4 MG/ML
.1-.4 INJECTION, SOLUTION INTRAMUSCULAR; INTRAVENOUS; SUBCUTANEOUS
Status: DISCONTINUED | OUTPATIENT
Start: 2017-10-04 | End: 2017-10-07 | Stop reason: HOSPADM

## 2017-10-04 RX ORDER — CARBAMAZEPINE 100 MG/5ML
150 SUSPENSION ORAL EVERY 6 HOURS
Status: DISCONTINUED | OUTPATIENT
Start: 2017-10-04 | End: 2017-10-06

## 2017-10-04 RX ORDER — POTASSIUM CL/LIDO/0.9 % NACL 10MEQ/0.1L
10 INTRAVENOUS SOLUTION, PIGGYBACK (ML) INTRAVENOUS
Status: DISCONTINUED | OUTPATIENT
Start: 2017-10-04 | End: 2017-10-07 | Stop reason: HOSPADM

## 2017-10-04 RX ORDER — POLYETHYLENE GLYCOL 3350 17 G/17G
17 POWDER, FOR SOLUTION ORAL DAILY PRN
Status: DISCONTINUED | OUTPATIENT
Start: 2017-10-04 | End: 2017-10-07 | Stop reason: HOSPADM

## 2017-10-04 RX ORDER — PROCHLORPERAZINE MALEATE 5 MG
5-10 TABLET ORAL EVERY 6 HOURS PRN
Status: DISCONTINUED | OUTPATIENT
Start: 2017-10-04 | End: 2017-10-07 | Stop reason: HOSPADM

## 2017-10-04 RX ORDER — CALCIUM CARBONATE 1250 MG/5ML
1250 SUSPENSION ORAL
Status: DISCONTINUED | OUTPATIENT
Start: 2017-10-04 | End: 2017-10-07

## 2017-10-04 RX ORDER — CIPROFLOXACIN 2 MG/ML
400 INJECTION, SOLUTION INTRAVENOUS EVERY 12 HOURS
Status: DISCONTINUED | OUTPATIENT
Start: 2017-10-04 | End: 2017-10-07

## 2017-10-04 RX ORDER — ALBUTEROL SULFATE 0.83 MG/ML
2.5 SOLUTION RESPIRATORY (INHALATION) EVERY 4 HOURS PRN
Status: DISCONTINUED | OUTPATIENT
Start: 2017-10-04 | End: 2017-10-07 | Stop reason: HOSPADM

## 2017-10-04 RX ORDER — LEVOTHYROXINE SODIUM 150 UG/1
150 TABLET ORAL
Status: DISCONTINUED | OUTPATIENT
Start: 2017-10-04 | End: 2017-10-07 | Stop reason: HOSPADM

## 2017-10-04 RX ORDER — ONDANSETRON 4 MG/1
4 TABLET, ORALLY DISINTEGRATING ORAL EVERY 6 HOURS PRN
Status: DISCONTINUED | OUTPATIENT
Start: 2017-10-04 | End: 2017-10-07 | Stop reason: HOSPADM

## 2017-10-04 RX ORDER — ASPIRIN 81 MG/1
81 TABLET, CHEWABLE ORAL DAILY
Status: DISCONTINUED | OUTPATIENT
Start: 2017-10-04 | End: 2017-10-07 | Stop reason: HOSPADM

## 2017-10-04 RX ORDER — POTASSIUM CHLORIDE 29.8 MG/ML
20 INJECTION INTRAVENOUS
Status: DISCONTINUED | OUTPATIENT
Start: 2017-10-04 | End: 2017-10-07 | Stop reason: HOSPADM

## 2017-10-04 RX ORDER — POTASSIUM CHLORIDE 1500 MG/1
20-40 TABLET, EXTENDED RELEASE ORAL
Status: DISCONTINUED | OUTPATIENT
Start: 2017-10-04 | End: 2017-10-07 | Stop reason: HOSPADM

## 2017-10-04 RX ORDER — LANOLIN ALCOHOL/MO/W.PET/CERES
6 CREAM (GRAM) TOPICAL EVERY EVENING
Status: DISCONTINUED | OUTPATIENT
Start: 2017-10-04 | End: 2017-10-07 | Stop reason: HOSPADM

## 2017-10-04 RX ORDER — SODIUM CHLORIDE 9 MG/ML
INJECTION, SOLUTION INTRAVENOUS CONTINUOUS
Status: DISCONTINUED | OUTPATIENT
Start: 2017-10-04 | End: 2017-10-05

## 2017-10-04 RX ADMIN — CIPROFLOXACIN 400 MG: 2 INJECTION, SOLUTION INTRAVENOUS at 09:57

## 2017-10-04 RX ADMIN — SODIUM CHLORIDE: 9 INJECTION, SOLUTION INTRAVENOUS at 08:46

## 2017-10-04 RX ADMIN — CALCIUM CARBONATE 1250 MG: 1250 SUSPENSION ORAL at 08:29

## 2017-10-04 RX ADMIN — CALCIUM CARBONATE 1250 MG: 1250 SUSPENSION ORAL at 20:40

## 2017-10-04 RX ADMIN — HYDROCORTISONE 20 MG: 20 TABLET ORAL at 08:30

## 2017-10-04 RX ADMIN — POTASSIUM & SODIUM PHOSPHATES POWDER PACK 280-160-250 MG 1 PACKET: 280-160-250 PACK at 20:50

## 2017-10-04 RX ADMIN — POTASSIUM & SODIUM PHOSPHATES POWDER PACK 280-160-250 MG 1 PACKET: 280-160-250 PACK at 08:30

## 2017-10-04 RX ADMIN — SODIUM CHLORIDE: 9 INJECTION, SOLUTION INTRAVENOUS at 02:12

## 2017-10-04 RX ADMIN — VITAMIN D, TAB 1000IU (100/BT) 2000 UNITS: 25 TAB at 08:30

## 2017-10-04 RX ADMIN — MEROPENEM 500 MG: 500 INJECTION, POWDER, FOR SOLUTION INTRAVENOUS at 02:09

## 2017-10-04 RX ADMIN — HYDROCORTISONE SODIUM SUCCINATE 50 MG: 100 INJECTION, POWDER, FOR SOLUTION INTRAMUSCULAR; INTRAVENOUS at 02:09

## 2017-10-04 RX ADMIN — Medication 2 G: at 09:53

## 2017-10-04 RX ADMIN — SODIUM CHLORIDE, POTASSIUM CHLORIDE, SODIUM LACTATE AND CALCIUM CHLORIDE: 600; 310; 30; 20 INJECTION, SOLUTION INTRAVENOUS at 00:30

## 2017-10-04 RX ADMIN — CIPROFLOXACIN 400 MG: 2 INJECTION, SOLUTION INTRAVENOUS at 22:43

## 2017-10-04 RX ADMIN — VANCOMYCIN HYDROCHLORIDE 1500 MG: 5 INJECTION, POWDER, LYOPHILIZED, FOR SOLUTION INTRAVENOUS at 16:54

## 2017-10-04 RX ADMIN — BRIVARACETAM 100 MG: 50 TABLET, FILM COATED ORAL at 20:43

## 2017-10-04 RX ADMIN — CALCIUM CARBONATE 1250 MG: 1250 SUSPENSION ORAL at 11:22

## 2017-10-04 RX ADMIN — HYDROCORTISONE SODIUM SUCCINATE 50 MG: 100 INJECTION, POWDER, FOR SOLUTION INTRAMUSCULAR; INTRAVENOUS at 08:30

## 2017-10-04 RX ADMIN — ASPIRIN 81 MG 81 MG: 81 TABLET ORAL at 08:31

## 2017-10-04 RX ADMIN — CARBAMAZEPINE 150 MG: 100 SUSPENSION ORAL at 20:42

## 2017-10-04 RX ADMIN — HYDROCORTISONE SODIUM SUCCINATE 50 MG: 100 INJECTION, POWDER, FOR SOLUTION INTRAMUSCULAR; INTRAVENOUS at 16:40

## 2017-10-04 RX ADMIN — CARBAMAZEPINE 150 MG: 100 SUSPENSION ORAL at 08:31

## 2017-10-04 RX ADMIN — ENOXAPARIN SODIUM 40 MG: 40 INJECTION SUBCUTANEOUS at 08:29

## 2017-10-04 RX ADMIN — SODIUM CHLORIDE, POTASSIUM CHLORIDE, SODIUM LACTATE AND CALCIUM CHLORIDE 2217 ML: 600; 310; 30; 20 INJECTION, SOLUTION INTRAVENOUS at 00:16

## 2017-10-04 RX ADMIN — Medication 40 MG: at 08:30

## 2017-10-04 RX ADMIN — MEROPENEM 500 MG: 500 INJECTION, POWDER, FOR SOLUTION INTRAVENOUS at 15:11

## 2017-10-04 RX ADMIN — MELATONIN 6 MG: 3 TAB ORAL at 20:48

## 2017-10-04 RX ADMIN — CARBAMAZEPINE 150 MG: 100 SUSPENSION ORAL at 13:37

## 2017-10-04 RX ADMIN — SODIUM CHLORIDE: 9 INJECTION, SOLUTION INTRAVENOUS at 18:46

## 2017-10-04 RX ADMIN — MEROPENEM 500 MG: 500 INJECTION, POWDER, FOR SOLUTION INTRAVENOUS at 20:44

## 2017-10-04 RX ADMIN — CARBAMAZEPINE 150 MG: 100 SUSPENSION ORAL at 02:09

## 2017-10-04 RX ADMIN — BRIVARACETAM 100 MG: 50 TABLET, FILM COATED ORAL at 08:29

## 2017-10-04 RX ADMIN — HYDROCORTISONE SODIUM SUCCINATE 50 MG: 100 INJECTION, POWDER, FOR SOLUTION INTRAMUSCULAR; INTRAVENOUS at 23:55

## 2017-10-04 RX ADMIN — Medication 15 ML: at 08:29

## 2017-10-04 RX ADMIN — LEVOTHYROXINE SODIUM 150 MCG: 150 TABLET ORAL at 08:31

## 2017-10-04 RX ADMIN — MEROPENEM 500 MG: 500 INJECTION, POWDER, FOR SOLUTION INTRAVENOUS at 08:31

## 2017-10-04 RX ADMIN — VANCOMYCIN HYDROCHLORIDE 1500 MG: 5 INJECTION, POWDER, LYOPHILIZED, FOR SOLUTION INTRAVENOUS at 04:20

## 2017-10-04 RX ADMIN — LINEZOLID 600 MG: 600 INJECTION, SOLUTION INTRAVENOUS at 00:27

## 2017-10-04 NOTE — PROGRESS NOTES
Pt stable on Bipap 12/5 50% all shift. BBS diminished. Total facemask in place. Will continue to follow.     Mihir Catherine RT

## 2017-10-04 NOTE — PROGRESS NOTES
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  Other Pseudomonas cultures in this patient have been sensitive to Meropenem.  - Agree w Meropenem and Vancomycin for now. VRE less likely to be a pathogen than colonizer so agree with not starting Linezolid.  - Will add Ciprofloxacin.  - Agree with ID consult. Complicated patient.

## 2017-10-04 NOTE — PROGRESS NOTES
Attempted ABG, but blood drawn is VBG.       Recent Labs  Lab 10/03/17  2226   PH 7.24*   PCO2 67*   PO2 22*   HCO3 29*         Venous Blood Gas    Recent Labs  Lab 10/03/17  2203   PHV 7.15*   PCO2V 80*   PO2V 19*   HCO3V 28     Bipap settings adjusted to 17/7 RR15 50% per VBG results. Pt much more alert after Bipap placement, color less dusky. Will cont to follow.     10/3/2017  Germaine Zapata RT

## 2017-10-04 NOTE — PLAN OF CARE
Problem: Patient Care Overview  Goal: Individualization & Mutuality     Patient has been ok on room air, allowed oral cares, aaron patent, IV abx given, expressive aphasic, /73 (BP Location: Right arm)  Pulse 90  Temp 97.9  F (36.6  C) (Oral)  Resp 18  Wt 76.5 kg (168 lb 10.4 oz)  SpO2 97%  BMI 23.52 kg/m2, Mother bedside and involved in cares. Transferred to 6th floor

## 2017-10-04 NOTE — ED NOTES
DATE:  10/3/2017   TIME OF RECEIPT FROM LAB:  2206  LAB TEST:  PCO2  LAB VALUE:  80.0  RESULTS GIVEN WITH READ-BACK TO (PROVIDER):  Srikanth Ryan MD  TIME LAB VALUE REPORTED TO PROVIDER:   6299

## 2017-10-04 NOTE — PROGRESS NOTES
ICU Multi-Disciplinary Note  57 year old male with past history TBI with right spastic hemiplegia, aphasia, dysphagia s/p G-tube, seizure disorder, panhypopit and V-tach/V-fib who presents with sepsis due to right middle and lower lobe pneumonia. Remains hemodynamically stable, stable from respiratory status. LA down trending.  Patient condition reviewed and discussed while on multidisciplinary rounds today.   Please note these minor interventions that were initiated:  1. D/C home oral hydrocortisone as patient on stress dose IV hydrocortisone  The Critical Care service will continue to follow peripherally while patient is within the ICU. We are readily available should issues arise.  Please feel free to contact us for anything with which we may be of assistance.    Shaneka Clark

## 2017-10-04 NOTE — PLAN OF CARE
"Problem: Patient Care Overview  Goal: Plan of Care/Patient Progress Review  Outcome: Improving  Patient is alert.  Nonverbal, but will nod head or give thumbs up or down to questions.  Patient does not appear to have pain.  Patient does refuse cares and gets angry when mouth care is done.  Lungs are diminished.  Weaned to RA with O2Sats low to mid 90s. Nonproductive cough.  On abx.  ID following.  Afebrile.  Possible aspiration pneumonia-mother, who is caregiver, visited patient and verbalized that she gives the patient \"tiny bits of food, even though I'm not supposed to, I do it for his comfort.\"  Speech evaluated chart and feel patient should remain NPO.  G-J tube in place and flushes well.  Nutrition consulted to resume TF.  Remains SR without ectopy.  BP stable. Weak pulses.  Mild foot edema.  PCDs were on and patient was able to get one off, so will allow patient a break from them (started at 1400).  On scheduled Lovenox.  Lerma in place with good to high UO.  K WNL.  Mg low and replaced per protocol.  Patient does have reddened, but blanchable, coccyx-mepelex in place.          "

## 2017-10-04 NOTE — PROGRESS NOTES
[facility]    Hospitalist Progress Note    Assessment & Plan   Keyon Farias is a 57 year old male with past history TBI with right spastic hemiplegia, aphasia, dysphagia s/p G-tube, seizure disorder, panhypopit and V-tach/V-fib who presents with sepsis due to right middle and lower lobe pneumonia.     Severe sepsis due to right middle and lower lobe pneumonia  Acute hypoxic and hypercapnic respiratory failure  -Presents with fever (102), tachycardia (150's), tachypnea (40's), leukocytosis (17 with left shift) with -ABG demonstrating hypoxia and hypercapnea with CT chest showing right middle and lower lobe infiltrates.    -Lactate elevated at 10.  Given amikacin, linezolid and ciprofloxacin in ED given history of drug-resistant organisms (including VRE and Psuedomonas resistant to Zosyn and meropenem though these were isolated from pancreatic fluid).  -suspect this is most likely a typical aspiration pneumonia given dysphagia and mother reporting she gives him 3-4 bites daily  -continue with cipro, meropenem and vancomycin  -Appreciate infectious disease consult  -Lactate improved  -stress dose steroids for 24 hours  -follow blood cultures  -Off BiPAP  -Transfer to floor      Hyponatremia  Admission Na 132, this is slightly improved from recent baseline.  -Improved     TBI with aphasia, dysphagia s/p G-tube, right spastic hemiplegia  Able to respond to simple questions.  Nonverbal at baseline  -Nutrition consult for nocturnal feedings  -SLP consult     Seizure disorder  Thought due to TBI.  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  -continue stress dose hydrocortisone and PTA evothyroxine      D/W: RN  DVT Prophylaxis: Pneumatic Compression Devices  Code Status: Full Code    Disposition: Expected discharge in 2 + days  Migel Stoddard MD    Interval History   Afebrile this morning.  Oxygen requirement  improved to 2 L.     -Data reviewed today: I reviewed all new labs and imaging results over the last 24 hours. I personally reviewed no images or EKG's today.    Physical Exam   Temp: 98.2  F (36.8  C) Temp src: Axillary BP: 107/59 Pulse: 124 Heart Rate: 96 Resp: 29 SpO2: 93 % O2 Device: None (Room air) Oxygen Delivery: 2 LPM  Vitals:    10/04/17 0200   Weight: 76.5 kg (168 lb 10.4 oz)     Vital Signs with Ranges  Temp:  [98.2  F (36.8  C)-102  F (38.9  C)] 98.2  F (36.8  C)  Pulse:  [122-156] 124  Heart Rate:  [] 96  Resp:  [11-42] 29  BP: ()/(43-95) 107/59  FiO2 (%):  [50 %] 50 %  SpO2:  [91 %-100 %] 93 %  I/O last 3 completed shifts:  In: 1175 [I.V.:1175]  Out: 1600 [Urine:1600]    Constitutional: Awake, non-verbal  HEENT: Moist oral mucosa, no oral lesions, No pallor or icterus  Neck- Supple, Good ROM, No JVD  Respiratory:  Few crackles bilaterally; Normal WOB  Cardiovascular: RRR, No murmur  GI: Soft, Non- tender, feeding tube in place  Skin/Integument: Warm and dry, no rashes  MSK: No joint deformity or swelling, no edema  Neuro: CN- grossly intact      Medications     NaCl 100 mL/hr at 10/04/17 1251       aspirin  81 mg Per J Tube Daily     Brivaracetam  100 mg Oral BID     carBAMazepine  150 mg Oral Q6H     fiber modular  1 packet Per Feeding Tube TID     levothyroxine  150 mcg Per J Tube QAM AC     melatonin  6 mg Per J Tube QPM     multivitamins with minerals  15 mL Per J Tube Daily     potassium & sodium phosphates  1 packet Oral BID     cholecalciferol  2,000 Units Per G Tube Daily     enoxaparin  40 mg Subcutaneous Q24H     pantoprazole  40 mg Per NG tube Daily     calcium carbonate  1,250 mg Oral TID w/meals     ciprofloxacin  400 mg Intravenous Q12H     meropenem  500 mg Intravenous Q6H     vancomycin (VANCOCIN) IV  1,500 mg Intravenous Q12H     hydrocortisone sodium succinate PF  50 mg Intravenous Q8H       Data     Recent Labs  Lab 10/04/17  0610 10/03/17  2151   WBC 17.6* 17.0*   HGB  14.6 16.9   MCV 83 85   * 175    132*   POTASSIUM 4.2 5.2   CHLORIDE 101 95   CO2 25 23   BUN 14 16   CR 0.98 0.95   ANIONGAP 9 14   STEVE 8.7 9.7   GLC 98 70   ALBUMIN  --  3.8   PROTTOTAL  --  9.2*   BILITOTAL  --  0.4   ALKPHOS  --  91   ALT  --  36   AST  --  43   LIPASE  --  299       Recent Results (from the past 24 hour(s))   CT Chest/Abdomen/Pelvis w Contrast    Narrative    CT CHEST/ABDOMEN/PELVIS W CONTRAST  10/3/2017 11:13 PM     HISTORY: Severe sepsis, history of necrotizing pancreatitis, possible  respiratory infection.    TECHNIQUE: Volumetric acquisition through chest, abdomen and pelvis  with IV contrast. 82 mL Isovue-370. Radiation dose for this scan was  reduced using automated exposure control, adjustment of the mA and/or  kV according to patient size, or iterative reconstruction technique.    COMPARISON: None.    FINDINGS:   Chest: Moderate hazy infiltrate in the right mid and lower lung likely  due to pneumonia. Mild infiltrate or atelectasis at the left lung base  medially. No pleural effusions. Borderline prominent right  paratracheal and subcarinal lymph nodes. Heart size is within normal  limits.    Abdomen and pelvis: Liver, spleen, gallbladder, adrenal glands and  kidneys demonstrate no worrisome findings. Well-circumscribed 2.5 cm  rounded fluid pocket/cystic mass abutting the undersurface of the  pancreatic tail which is likely a pseudocyst. This is new since the  previous study and is at the site where there was previously a drain  in place communicating with the stomach. No significant peripancreatic  inflammatory change to suggest acute pancreatitis. No other fluid  collections. Percutaneous gastrostomy tube.    Lerma catheter in the bladder. Coiled opaque tube in the ascending  colon which should represent migration of the patient's  cystogastrostomy tube.      Impression    IMPRESSION:  1. Fairly extensive right mid and lower lung infiltrate likely  pneumonia. Small area  of infiltrate or atelectasis left base medially.  2. Small cystic lesion abutting the pancreatic tail likely a  pseudocyst at the site of a previous drainage catheter. No  peripancreatic inflammatory change or other acute findings.  3. Opaque tubing in the right hemicolon which should represent  migration of the patient's cystogastrostomy tube.    Findings discussed with ER physician at 11:45 PM    PANFILO KING MD   Chest  XR, 1 view PORTABLE    Narrative    XR CHEST PORT 1 VW   10/3/2017 11:15 PM     INDICATION: Dyspnea.    COMPARISON: 6/4/2017.      Impression    IMPRESSION: Shallow inspiration. Mild infiltrate at the right base  better seen on the CT. There is also a small area of infiltrate or  atelectasis at the left base medially. Opaque tubing projecting over  the right colon.    PANFILO KING MD

## 2017-10-04 NOTE — PROVIDER NOTIFICATION
MD Notification    Notified Person:  MD    Notified Persons Name: Dr. Stoddard    Notification Date/Time: 10/4/17 1335    Notification Interaction:  Talked with Physician    Purpose of Notification: Need clarification on continuous cardiac monitoring order.  Did you want telemetry ordered?    Orders Received: Telemetry for sepsis.    Comments:

## 2017-10-04 NOTE — ED PROVIDER NOTES
History     Chief Complaint:  Respiratory Distress    The history is provided by a parent. The history is limited by the condition of the patient (Patient is nonverbal and critically ill.).      Keyon Farias is a 57 year old male with a history of TBI with resultant seizure disorder, aphasia, right sided hemiplegia, and recent treatment for necrotizing pancreatitis who presents to the emergency department today for evaluation of respiratory distress, fever and change in mental status and is accompanied by his mother. The patient's mother reports several days of a wet cough, and at 1700 after the patient woke from a nap today, noticed that the patient was having difficulty breathing with a congested cough, blue colored skin, and altered mental status from baseline. Here, the patient has needed 10-15 L of oxygen to maintain oxygen saturation. She denies any skin rashes or ulcers. Denies recent vomiting, abdominal pain or problems with the G tube or feedings. No recent worsening diarrhea or change in urination.    Allergies:  Dilantin [Phenytoin Sodium]  Valproic Acid    Medications:    Calcium carbonate  Carbamazepine  Hydrocortisone  Levothyroxine  Brivaracetam  Pantoprazole  Miralax  Potassium & sodium phosphates  Potassium phosphate monobasic  Testosterone cypionate  Triamcinolone  Vitamin D3    Past Medical History:    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)   DVT of upper extremity (deep vein thrombosis) (H)   Gastro-oesophageal reflux disease   Panhypopituitarism (H)   Pneumonia   Seizures (H)   Septic shock (H)   Spastic hemiplegia affecting dominant side (H)   Thyroid disease   Tracheostomy care (H)   Traumatic brain injury (H)   Unspecified cerebral artery occlusion with cerebral infarction   UTI (urinary tract infection)   Ventricular fibrillation (H)   Ventricular tachyarrhythmia (H)     Past Surgical History:    Endoscopic ultrasound upper gastrointestinal tract  EGD combined, x3  Endoscopic  ultrasound, EGD, necrosectomy, combined  Reconstructive facial injury  Laparoscopic appendectomy  Laparoscopic assisted insertion tube gastrostomy  Right hand repair  Tracheostomy z2  Vascular surgery    Family History:    History reviewed. No pertinent family history.     Social History:  The patient was accompanied to the ED by his mother and family.  Smoking Status: Former smoker  Smokeless Tobacco: Never used  Alcohol Use: No  Marital Status:  Single [1]     Review of Systems   Reason unable to perform ROS: Condition of the patient and patient is nonverbal.     Physical Exam   Vitals:  Patient Vitals for the past 24 hrs:   BP Temp Temp src Pulse Heart Rate Resp SpO2   10/04/17 0039 - - - - 117 17 -   10/04/17 0030 126/76 - - - 112 21 100 %   10/04/17 0027 - 100.9  F (38.3  C) Rectal - - - -   10/04/17 0015 122/79 - - - 116 19 97 %   10/04/17 0000 105/75 - - - 118 20 97 %   10/03/17 2345 125/77 - - - 121 19 99 %   10/03/17 2330 (!) 122/95 - - - 122 13 -   10/03/17 2324 - - - - 121 15 -   10/03/17 2323 127/77 - - - - - -   10/03/17 2315 124/67 - - 124 - 20 100 %   10/03/17 2255 116/61 - - 122 - 26 99 %   10/03/17 2230 113/83 - - - 140 (!) 34 97 %   10/03/17 2224 - - - - 142 (!) 32 97 %   10/03/17 2220 - - - - 149 29 96 %   10/03/17 2208 122/63 - - - 147 24 100 %   10/03/17 2200 - - - - 161 (!) 31 99 %   10/03/17 2159 129/89 - - - - - -   10/03/17 2157 - 102  F (38.9  C) Rectal - - - -   10/03/17 2150 - - - - 161 (!) 42 99 %   10/03/17 2144 120/88 101  F (38.3  C) Temporal 156 - (!) 36 95 %     Physical Exam  General: Increased respiratory effort. Patient appears ill with poor perfusion  Head:  Scalp, face, and head appear normal  Eyes:  Pupils are equal, round, and reactive to light    No nystagmus    Conjunctivae non-injected and sclerae white  ENT:    The external nose is normal    Pinnae are normal    The oropharynx is normal, mucous membranes moist    Uvula is in the midline  Neck:  Normal range of  motion    There is no rigidity noted    Trachea is in the midline  CV:  Tachycardic rate, regular rhythm    Normal S1/S2, no S3/S4    No murmur or rub    Capillary refill 7-9 seconds, skin mottled  Resp:  Patient with rapid shallow breathing. Breath sounds diminished bilaterally. Crackles at the bases.    + tachypnea    Mild increased work of breathing, no accessory muscle use or belly breathing    No wheezing, or rhonchi  GI:  Abdomen is soft, no rigidity or guarding    GJ tube present LUQ, no erythema induration or discharge or bleeding. Yellow gastric contents seen in the tube.    No distension, or mass    No tenderness or rebound tenderness   :  Normal circumcised male genitalia. Urethral meatus normal without bleeding or discharge. No lesions or rash. No masses or swelling. No erythema.  MS:  RUE with contracture    Patient spontaneously moves LUE and LLE.    No lower extremity edema  Skin:  No rash or acute skin lesions noted. Mild erythema noted over sacrum no skin breakdown or ulcers. No evidence of skin or soft tissue infection  Neuro: Awake and alert, aphasic, follows commands, responds with 1-2 word responses.    LUE and LLE with 5/5 strength. SILT left side.    Right hemiplegia    Emergency Department Course     ECG:  ECG taken at 2147, ECG read at 2204  Sinus tachycardia with short MN  Right superior axis deviation  Pulmonary disease pattern  ST & T wave abnormality, consider lateral ischemia  Abnormal ECG  Rate 151 bpm. MN interval 100. QRS duration 86. QT/QTc 280/443. P-R-T axes * 263 105.    Imaging:  Radiology findings were communicated with the family and Admitting MD who voiced understanding of the findings.    CT Chest/Abdomen/Pelvis w Contrast  1. Fairly extensive right mid and lower lung infiltrate likely  pneumonia. Small area of infiltrate or atelectasis left base medially.  2. Small cystic lesion abutting the pancreatic tail likely a  pseudocyst at the site of a previous drainage catheter.  No  peripancreatic inflammatory change or other acute findings.  3. Opaque tubing in the right hemicolon which should represent the  patient's cystogastrostomy tube.  Reading per radiology    Chest XR, 1 view portable  Shallow inspiration. Mild infiltrate at the right base  better seen on the CT. There is also a small area of infiltrate or  atelectasis at the left base medially. Opaque tubing projecting over  the right colon.  Reading per radiology    Laboratory:  Laboratory findings were communicated with the family and Admitting MD who voiced understanding of the findings.    CBC: WBC 17.0 o/w WNL. (HGB 16.9, )   CMP: Na 132 (L), Protein Total 9.1 (H) o/w WNL (Creatinine 0.95)  ABG: pH 7.24 (L), pCO2 67 (H), pO2 22 (LL), HCO3 29 (H), BE 0.9  OxyHGB: 26 (L)  ISTAT gases venous POCT: pH 7.15 (LL), HCO3 28, PCO2 80 (HH), PO2 19 (L), O2 sat 17   Lactic Acid (Collected 2151): 10.1  Blood cultures x2: Pending    Lipase: 299  Carbamazepine Total: 13.4 (H)    UA with micro: pH 7.5, yellow, Albumin 30, Amorphous crystals moderate (A) OWNL    Interventions:  2154: Acetaminophen 650 mg, IR  2202: Lactated ringers 1,000 mL, IV  2220: Lactated ringers 2,217 mL, IV   2231: Ciprofloxacin 400 mg, IV   2344: Amikacin 350 mg, IV  0027: Linezolid 600 mg, IV  0030: Lactated ringers 150 mL/hr, IV drip     Emergency Department Course:  Nursing notes and vitals reviewed.  I performed an exam of the patient as documented above.   IV was inserted and blood was drawn for laboratory testing, results above.  The patient provided a urine sample here in the emergency department. This was sent for laboratory testing, findings above.  The patient was sent for a CT Chest/Abdomen/Pelvis w Contrast while in the emergency department, results above.   At 2301 the patient was rechecked.   2343: I spoke with Dr. Sevilla of the radiology service from [facility] regarding patient's presentation, findings, and plan of care.  2359: I spoke  with Dr. Meier of the intensivist service from [facility] regarding patient's presentation, findings, and plan of care.  At 0001 the patient was rechecked and patient's family was updated on the results and plan of care.   I discussed the treatment plan with the patient's family. They expressed understanding of this plan and consented to admission. I discussed the patient with Dr. Meier, who will admit the patient to a monitored ICU bed for further evaluation and treatment.  I personally reviewed the laboratory and imaging results with the patient's family and answered all related questions prior to admission.    Impression & Plan      CMS Diagnoses:   The patient has signs of Septic Shock as evidenced by:    1. Presence of Sepsis, AND  2. Lactic Acid level >4    Time sepsis diagnosis confirmed = 2151 as this was the time whenLactate was resulted and the level was >4      3 Hour Septic Shock Bundle Completion:  1. Initial Lactic Acid Result:   Recent Labs   Lab Test  10/03/17   2151 06/05/17   0855  02/06/17   0811   LACT  10.1*  1.6  0.7     2. Blood Cultures before Antibiotics: Yes  3. Broad Spectrum Antibiotics Administered: Yes, Cipro, Amikacin, Linezolid in the ED.     Anti-infectives (Future)    Start     Dose/Rate Route Frequency Ordered Stop    10/04/17 0115  ciprofloxacin (CIPRO) infusion 400 mg      400 mg  over 1 Hours Intravenous EVERY 12 HOURS 10/04/17 0112      10/04/17 0115  meropenem (MERREM) 500 mg vial to attach to  mL bag for ADULTS or 25 mL bag for PEDS      500 mg  over 30 Minutes Intravenous EVERY 6 HOURS 10/04/17 0112      10/03/17 2223  linezolid (ZYVOX) infusion 600 mg      600 mg  over 60 Minutes Intravenous ONCE 10/03/17 2222          4. 3217 ml of IV fluids.      6 Hour Severe Sepsis Bundle Completion:  1. Repeat Lactic Acid Level: Ordered at 0115 and pending at the time the patient was admitted to the ICU  2. No vasopressors needed  I attest to having  performed a repeat sepsis exam and assessment of perfusion at 2347 and the results demonstrate improved perfusion.    Medical Decision Making:  Keyon Farias is a 57 year old male with a history of TBI with resultant seizure disorder, aphasia, right sided hemiplegia, who was recently treated in June for necrotizing pancreatitis which caused severe illness and infection and looking back at the cultures, has grown multiple multi-drug resistant organisms including enterococcus, pseudomonas, as well as others. Patient presents today with clear severe sepsis resulting in poor perfusion, respiratory failure, severe mixed respiratory and metabolic acidosis. Clinically, patient is following commands with the left side of his body and does respond with short 1-2 word answers. Possible sources are considered including, respiratory, urine, skin, intraabdominal. Will obtain CT scan of the chest/abdomen/pelvis to evaluate for sources of infection given his recent history of necrotizing pancreatitis and multiple abdominal fluid collections. His belly is non peritoneal on examination. He has no wounds that appear to be infected, including no sacral decubitus ulcers. Sepsis protocol initiated. Initial antibiotics were determined based on prior culture data. I selected amikacin, ciprofloxacin, linezolid given his multi-drug resistant organism history. Will monitor frequently. Patient will likely require admission to the ICU. BiPAP was initiated given his hypercapnic respiratory failure and he is tolerating this well.    After initial fluid bolus the patient's HR and perfusion improved. He is tolerating the Bipap well and repeat blood gas shows improved pCO2. Blood pressure has remained stable. CT with no evidence of significant intra-abdominal infection. Note made of tubing present in the colon from history of cystogastrostomy tube.    Critical Care time was 32 minutes for this patient excluding procedures.     Diagnosis:     ICD-10-CM    1. Septic shock (H) A41.9 Carbamazepine total    R65.21 Carbamazepine total     Lipase     Lipase     CANCELED: Lipase     CANCELED: Carbamazepine total   2. Acute respiratory failure with hypoxia and hypercapnia (H) J96.01     J96.02    3. Acidosis E87.2      Disposition:   Admission to ICU    Scribe Disclosure:  Olya DYKES, am serving as a scribe at 10:04 PM on 10/3/2017 to document services personally performed by Srikanth Ryan MD, based on my observations and the provider's statements to me.    10/3/2017    EMERGENCY DEPARTMENT       Srikanth Ryan MD  10/04/17 0333

## 2017-10-04 NOTE — CONSULTS
INFECTIOUS DISEASE CONSULTATION       REQUESTING PHYSICIAN:  Dr. Manish Meier      IMPRESSION:   1.  A 57-year-old male with traumatic brain injury and spastic hemiplegia, admitted with acute fever, hypoxia and found to have infiltrates, consistent with pneumonia, very likely aspiration pneumonia.   2.  Prior history of dysphagia and aspiration pneumonia, previous pneumonias with pseudomonas lung colonization but last was late in 2016.   3.  Necrotizing pancreatitis earlier this year with resistant organisms including a highly resistant pseudomonas and vancomycin-resistant enterococcus, no symptoms of that currently.   4.  Traumatic brain injury with significant aphagia, has a G-tube in place.   5.  Seizure disorder.      RECOMMENDATIONS:  Agree with meropenem, vancomycin and Cipro for now based on prior micro and likely micro, is clinically rapidly improved and likely not to have cultures, so probably simplify as clinical picture allows.      HISTORY OF PRESENT ILLNESS:  Keyon Farias is a 57-year-old male seen in consultation.  He has a complex medical history.  We have not seen the patient previously.  He has a history of traumatic brain injury and spastic paraparesis.  He has a G-tube in place with known dysphagia and aspiration.  He has had previous presumed aspiration pneumonia that has included a pseudomonas lung colonization, last positive sputum cultures in late 2016, it was a sensitive Pseudomonas.  He had a major necrotizing pancreatitis episode earlier this year that required drainage and extended antibiotics that included vancomycin-resistant enterococcus and a highly resistant pseudomonas including carbapenem and piperacillin resistance.  He got over that problem, has not had recurrent issues related to that.  He currently is admitted with acute hypoxia, fever and definite new infiltrates on imaging.  He was started on broad-spectrum antibiotics in the ER and then meropenem and vancomycin in the hospital.   He is already rapidly better down to just 2 liters nasal cannula oxygen and does not really communicate well, so difficult to know from a symptom standpoint but not having major fevers or sepsis symptoms currently.      PAST MEDICAL HISTORY:  Recurrent aspiration pneumonia with known dysphagia, history of traumatic brain injury and spastic paraparesis history of necrotizing pancreatitis earlier this year, prior history of hyponatremia, both now and prior episodes, history of panhypopituitarism.      ALLERGIES:  No antimicrobial allergies listed, Dilantin.      SOCIAL AND  FAMILY HISTORY:  Substantial resistant pathogens as noted above.  No family history obtainable currently.       MEDICATIONS:  As listed.      REVIEW OF SYSTEMS:  Largely unobtainable from the patient.  He appears well, denies any pain or discomfort as best he is able.   HEENT:  No significant focal lesions, no intraoral lesions.   NECK:  Supple, nontender, no meningismus or thyromegaly.   HEART:  Regular rhythm, no particular murmur.   LUNGS:  Relatively clear but rhonchi in the right lateral lung fields.   ABDOMEN:  Soft, nontender, G-tube site unremarkable.   EXTREMITIES:  No particular skin lesions noted.      LABORATORY:  Cultures are pending.  No sputum obtainable.  Lactic acid 10.1 at admission, now down to 3.1.  White count 17,000.      Thank you much for consultation.  Will follow the patient with you.         SAMEER MITTAL MD             D: 10/04/2017 09:19   T: 10/04/2017 09:43   MT: CELIA      Name:     BERT BARAJAS   MRN:      [id number]        Account:       [id number]   :      1960           Consult Date:  10/04/2017      Document: [id number]

## 2017-10-04 NOTE — ED NOTES
DATE:  10/3/2017   TIME OF RECEIPT FROM LAB:  2213  LAB TEST:  Lactate  LAB VALUE:  10.1  RESULTS GIVEN WITH READ-BACK TO (PROVIDER):  Srikanth Ryan MD  TIME LAB VALUE REPORTED TO PROVIDER:   5446

## 2017-10-04 NOTE — CONSULTS
"CLINICAL NUTRITION SERVICES  -  ASSESSMENT NOTE      RECOMMENDATIONS FOR MD/PROVIDER TO ORDER:   Recommend D/C IVF with resumption of nocturnal TF and increase free H20 flushes as appropriate.   Recommendations Ordered by Registered Dietitian (RD):   - Nocturnal TF Isosource 1.5 (substitution for Jevity 1.5) at 90 mL/hr x 16 hrs from 10 pm - 2 pm (hold 1 hr before and 1 hr after Synthroid per mother's request) = 1890 kcals, 86 gm pro (1.1 gm/kg and 93% protein needs), 958 mL H20, 19 gm fiber, 222 gm CHO  - NutriSource Fiber 3 packets per day = 45 kcals, 9 gm fiber  - Total (TF + NutriSource Fiber):  1935 kcals (25 kcal/kg), 28 gm fiber.  - Free H20 60 mL every 4 hrs   Malnutrition:  Non-Severe malnutrition  In Context of:  Chronic illness or disease        REASON FOR ASSESSMENT  Keyon Farias is a 57 year old male seen by Registered Dietitian for Provider Order - Registered Dietitian to Assess and Order TF per Medical Nutrition protocol      NUTRITION HISTORY  - Information obtained from mother Savannah and EPIC records  - Patient is on a pureed with honey thick liquids for pleasure diet at home (bites only).  - Typical food/fluid intake is poor.  - Diet history:  SLP quizzed pt's mother about his eating pattern who stated she is ok with small tastes of po on spoon (\"only honey thick liquids and some purees but definately no thin liquids\"). Mother verbalized understanding of high aspiration risk. She told MD that pt has only 3-4 bites daily.  - Tube feeding history:  Mother states she tries to get 5-5 1/2 cans of Jevity 1.5 per night in via starting TF at 10 pm at 85 mL/hr and increasing the rate to 100 mL/hr in the morning until all cans infused (holds TF 1 hr before and 1 hr after Synthroid administration).  She most commonly gives him 5 cans per night, providin kcals, 76 gm pro (1 gm/kg and 83% pro needs), 256 gm CHO, 912 mL H20, 26 gm fiber.  Pt also receives NutriSource Fiber 3 packets per day = 45 kcals, " 9 gm fiber.  Total (TF + NutriSource Fiber):  1820 kcals (24 kcal/kg and 96% energy needs).  Pt also receives Neutra Phos 2x per day.  Free H20 is 60 mL before and after medications.  - Supplements:  None   - No food allergies or intolerances.  Mom states that pt has gained weight and looks much better than when he was at [facility] in June as he's put on some weight.    CURRENT NUTRITION ORDERS  Diet Order:     NPO   Was asked to resume nocturnal TF via G-J FT    Current Intake/Tolerance:  10/4:  SLP bedside swallow evaluation --> Pt presents with significant oral pharyngeal dysphagia with s/s of aspiration. Pt is not appropriate for PO diet.    PHYSICAL FINDINGS  Observed  Muscle Wasting - clavicle, temporal  Obtained from Chart/Interdisciplinary Team  Edema - trace in feet    ANTHROPOMETRICS  Height:  5 ft 11 in  Weight:  76.5 kg  Body mass index is 23.52 kg/(m^2).  Weight Status:  Normal BMI  IBW:  78.2 kg  % IBW:  99%  Weight History:  Pt had lost 13 lbs from Feb --> June.  Since June, however, he has regained 19 lbs.    Wt Readings from Last 20 Encounters:   10/04/17 76.5 kg (168 lb 10.4 oz)   07/27/17 73.9 kg (163 lb)   06/15/17 70.3 kg (155 lb)   06/07/17 70.7 kg (155 lb 12.8 oz)   05/15/17 74.8 kg (165 lb)   02/09/17 73.3 kg (161 lb 8 oz)   01/23/17 74 kg (163 lb 2.3 oz)   12/09/16 79.5 kg (175 lb 4.3 oz)   10/31/16 72.7 kg (160 lb 3 oz)   09/09/16 93.8 kg (206 lb 12.7 oz)   01/06/16 79.1 kg (174 lb 6.1 oz)   11/18/15 76.6 kg (168 lb 12.8 oz)   04/27/15 72.6 kg (160 lb)   03/08/15 70.3 kg (155 lb)   07/04/14 70.3 kg (155 lb)   03/13/14 70.7 kg (155 lb 14.4 oz)   07/31/13 75.1 kg (165 lb 9.1 oz)   04/23/13 74.8 kg (165 lb)   12/14/11 65.8 kg (145 lb)       LABS  Labs reviewed  Phos 2.8 (NL)    MEDICATIONS  Medications reviewed  Synthroid  Solucortef  IVF NaCl at 100 mL/hr   Neutra Phos BID  Certavite daily via FT  NutriSource Fiber 3 packets per day    Dosing Weight:  76.5 kg (admit wt)    ASSESSED NUTRITION NEEDS  PER APPROVED PRACTICE GUIDELINES:  Estimated Energy Needs: 5682-5981 kcals (25-30 Kcal/Kg)  Justification: maintenance  Estimated Protein Needs:   grams protein (1.2-1.5 g pro/Kg)  Justification: Repletion  Estimated Fluid Needs:  4524-2442 mL (1 mL/Kcal)  Justification: maintenance    MALNUTRITION:  % Weight Loss:  Weight loss does not meet criteria for malnutrition   % Intake:  No decreased intake noted  Subcutaneous Fat Loss:  None observed  Muscle Loss:  Temporal region mild depletion and Clavicle bone region mil depletion  Fluid Retention:  Mild - could be partly nutritional as not quite meeting protein needs    Malnutrition Diagnosis: Non-Severe malnutrition  In Context of:  Chronic illness or disease    NUTRITION DIAGNOSIS:  Inadequate protein-energy intake related to TF held and severe dysphagia as evidenced by meeting 0% needs with plan for TF resumption    NUTRITION INTERVENTIONS  Recommendations / Nutrition Prescription  - Nocturnal TF Isosource 1.5 (substitution for Jevity 1.5) at 90 mL/hr x 16 hrs from 10 pm - 2 pm (hold 1 hr before and 1 hr after Synthroid per mother's request) = 1890 kcals, 86 gm pro (1.1 gm/kg and 93% protein needs), 958 mL H20, 19 gm fiber, 222 gm CHO  - NutriSource Fiber 3 packets per day = 45 kcals, 9 gm fiber  - Total (TF + NutriSource Fiber):  1935 kcals (25 kcal/kg), 28 gm fiber.  - Free H20 60 mL every 4 hrs    - Recommend D/C IVF with resumption of nocturnal TF and increase free H20 flushes as appropriate.    Implementation  Nutrition education: Not appropriate at this time due to patient condition and Provided education to patient's mother on plan for TF resumption and change in product and TF rate/schedule.  Collaboration and Referral of Nutrition care - Discussed pt during ICU interdisciplinary rounds this morning.  EN Composition, EN Schedule, Feeding Tube Flush - Entered TF and H20 flushes in EPIC as above.    Nutrition Goals  Nocturnal TF will meet %  estimated needs.    MONITORING AND EVALUATION:  Progress towards goals will be monitored and evaluated per protocol and Practice Guidelines    Lisa Garcia RD, CNSC
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
## 2017-10-04 NOTE — PHARMACY-ADMISSION MEDICATION HISTORY
Admission Medication History    Admission medication history interview status for the 10/3/2017 admission is complete. See EPIC admission navigator for prior to admission medications     Medication history source reliability:Good    Actions taken by pharmacist (provider contacted, etc):None     Additional medication history information not noted on PTA med list :None    Medication reconciliation/reorder completed by provider prior to medication history? No    Time spent in this activity: 15 minutes    Prior to Admission medications    Medication Sig Last Dose Taking? Auth Provider   Brivaracetam (BRIVIACT) 10 MG/ML solution Take 100 mg by mouth 2 times daily 10/3/2017 at 2100 Yes Reported, Patient   pantoprazole (PROTONIX) SUSP suspension Take 20 mg by mouth 2 times daily 10/3/2017 at x1 Yes Reported, Patient   carBAMazepine (TEGRETOL) 100 MG/5ML suspension Take 7.5 mLs (150 mg) by mouth every 6 hours 10/3/2017 at 1800 Yes Reny Crocker MD   potassium & sodium phosphates (NEUTRA-PHOS) 280-160-250 MG Packet Take 1 packet by mouth 2 times daily 0900, 1500, 2100.  10/3/2017 at x1 Yes Unknown, Entered By History   VITAMIN D, CHOLECALCIFEROL, PO Take 2,000 Units by mouth daily 10/3/2017 at Unknown time Yes Unknown, Entered By History   bacitracin ointment Apply topically daily as needed for wound care To PEG site.  at PRN Yes Unknown, Entered By History   melatonin 1 MG/ML LIQD liquid 6 mLs (6 mg) by Per J Tube route every evening 10/2/2017 at HS Yes Mariana Venegas MD   aspirin 10 mg/mL 8.1 mLs (81 mg) by Per J Tube route daily 10/3/2017 at 0900 Yes Mariana Venegas MD   multivitamins with minerals (CERTAVITE/CEROVITE) LIQD liquid 15 mLs by Per J Tube route daily 10/3/2017 at Unknown time Yes Mariana Venegas MD   fiber modular (NUTRISOURCE FIBER) packet 1 packet by Per Feeding Tube route 3 times daily 10/3/2017 at 0900 Yes Mariana Venegas MD   calcium carbonate 1250 (500  CA) MG/5ML SUSP suspension 5 mLs (1,250 mg) by Per J Tube route 3 times daily (with meals) 10/3/2017 at 0900 Yes Mariana Venegas MD   levothyroxine (SYNTHROID) 25 mcg/mL 6 mLs (150 mcg) by Per J Tube route every morning (before breakfast)  Patient taking differently: 150 mcg by Per J Tube route every morning (before breakfast) Hold tube feeding 1 hour prior and 1 hour after medication administered. 10/3/2017 at 0600 Yes Mariana Venegas MD   hydrocortisone (CORTEF) 2 mg/mL 10 mLs (20 mg) by Per J Tube route every morning 10/3/2017 at 0900 Yes Mariana Venegas MD   hydrocortisone (CORTEF) 2 mg/mL Take 5 mLs (10 mg) by mouth every evening 10/2/2017 at Unknown time Yes Mariana Venegas MD   bisacodyl (DULCOLAX) 10 MG Suppository Place 1 suppository (10 mg) rectally daily as needed for constipation  at PRN Yes Mariana Venegas MD   miconazole (MICATIN; MICRO GUARD) 2 % powder Apply topically every hour as needed for other (topical candidiasis)  at PRN Yes Alicia Roca APRN CNP   polyethylene glycol (MIRALAX/GLYCOLAX) Packet Take 17 g by mouth 2 times daily as needed (constipation)  at PRN Yes Alicia Roca APRN CNP   ACETAMINOPHEN  mg by Per Feeding Tube route every 4 hours as needed for pain  at prn Yes Unknown, Entered By History   testosterone cypionate (DEPOTESTOTERONE CYPIONATE) 200 MG/ML injection Inject 100 mg into the muscle once a week Fridays 9/29/2017 at Friday Yes Unknown, Entered By History       Adarsh Washington, PharmD

## 2017-10-04 NOTE — PLAN OF CARE
"Problem: Patient Care Overview  Goal: Plan of Care/Patient Progress Review  SLP: Pt familiar with ST team. Mother not present at this time. Bedside evaluation held. Please page SLP at [phone] when pt's mother arrives to further educate on profound dysphagia with poor prognosis previously documented and recurrent aspiration pneumonia.      Please see last ST note on 6/6/17 during previous hospitalization:     Problem: Goal Outcome Summary  Goal: Goal Outcome Summary  SLP: Paged out to MD to discuss POC for speech therapy. Will wait for pt's mother to arrive. In the past mother has ok'd po for pleasure as pt cont to be high aspiration risk. Will hold bedside swallow eval. Pt receives TF via g-tube.      SLP: Pt seen for bedside swallow evaluation. Mother present and extensive education provided. Pt presents with significant oral pharyngeal dysphagia with s/s of aspiration. Pt is not appropriate for PO diet. Pt's mother states she is ok with small tastes of po on spoon (\"only honey thick liquids and some purees but definately no thin liquids\"). Mother verbalizes understanding of high aspiration risk. Mother also asking about bolus tube feedings vs continuous tube feedings- SLP instructed mom that both MD and nutrition must weigh in on this decision to assess whether it is possible pending pt's tolerance. Mother requested that honey thick liquids be trialed. Tspn dipped into liquid and presented to pt- who opened mouth ( tongue deviates to the right) max tongue pumping followed by delayed swallow with coughing and gurgley vocal quality following swallow reflex. Mom verbalized understanding of this episode. Recommend: Continue NPO. TF to cont pending MD's okay. Assess whether bolus vs cont. Tf is a possibility. Possible care team conf to educate mother on prognosis for this pt.      Discharge Planner SLP   Patient plan for discharge: NPO   Current status: NPO with tube feeding  Barriers to return to prior living " situation: family's expectation of cont rehabilitation  Recommendations for discharge: consider bolus tf if pt can tolerate ( at request of mother), cont npo  Rationale for recommendations: high aspiration risk       Entered by: Marina Hyatt 06/06/2017 2:31 PM

## 2017-10-04 NOTE — CONSULTS
ID consult dictated IMP 1 58 yo male with complex overall and infection hx, first we have seen him, likely aspiration pneumonia    REc 1 seems rapidly better, same for now

## 2017-10-04 NOTE — PHARMACY-VANCOMYCIN DOSING SERVICE
Pharmacy Vancomycin Initial Note  Date of Service 2017  Patient's  1960  57 year old, male    Indication: Sepsis    Current estimated CrCl = Estimated Creatinine Clearance: 91.8 mL/min (based on Cr of 0.95).    Creatinine for last 3 days  10/3/2017:  9:51 PM Creatinine 0.95 mg/dL    Recent Vancomycin Level(s) for last 3 days  No results found for requested labs within last 72 hours.      Vancomycin IV Administrations (past 72 hours)      No vancomycin orders with administrations in past 72 hours.                Nephrotoxins and other renal medications (Future)    Start     Dose/Rate Route Frequency Ordered Stop    10/04/17 0400  vancomycin (VANCOCIN) 1500 mg in 0.9% NaCl 250 mL PREMIX      1,500 mg  166.7 mL/hr over 90 Minutes Intravenous EVERY 12 HOURS 10/04/17 0137            Contrast Orders - past 72 hours (72h ago through future)    Start     Dose/Rate Route Frequency Ordered Stop    10/03/17 223  iopamidol (ISOVUE-370) solution 82 mL      82 mL Intravenous ONCE 10/03/17 2233 10/03/17 2304                Plan:  1.  Start vancomycin  1500 mg IV q12h.   2.  Goal Trough Level: 15-20 mg/L   3.  Pharmacy will check trough levels as appropriate in 1-3 Days.    4. Serum creatinine levels will be ordered daily for the first week of therapy and at least twice weekly for subsequent weeks.    5. [place] method utilized to dose vancomycin therapy: Method 1    Klever Rodriguez
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
## 2017-10-04 NOTE — ED NOTES
Patient back from CT, patient interacting more appropriately at this time.  Patient tolerate CT well.  Patient's color has improved.  Patient's work of breathing decreased, tolerating BiPap well.  Patient giving thumbs up or squeezing fingers to communicate.  Patient's mother is at bedside, very involved in care.

## 2017-10-04 NOTE — PLAN OF CARE
Pt has been stable since arriving to the floor at 0100.  Wearing full face bipap most of the night until he requested a break this morning at 0600, now maintaining his O2 sats on 2L NC without signs of SOB.  VSS, temp now normalized, good urine output.  Mother updated at bedside last night and over the phone this AM.  Will continue to monitor.

## 2017-10-04 NOTE — ED NOTES
DATE:  10/3/2017   TIME OF RECEIPT FROM LAB:  2206  LAB TEST:  Ph  LAB VALUE:  7.15  RESULTS GIVEN WITH READ-BACK TO (PROVIDER):  Srikanth Ryan MD  TIME LAB VALUE REPORTED TO PROVIDER:   7565

## 2017-10-05 LAB
ANION GAP SERPL CALCULATED.3IONS-SCNC: 7 MMOL/L (ref 3–14)
BUN SERPL-MCNC: 14 MG/DL (ref 7–30)
CALCIUM SERPL-MCNC: 8.3 MG/DL (ref 8.5–10.1)
CARBAMAZEPINE SERPL-MCNC: 14.2 MG/L (ref 4–12)
CARBAMAZEPINE SERPL-MCNC: NORMAL MG/L (ref 4–12)
CHLORIDE SERPL-SCNC: 113 MMOL/L (ref 94–109)
CO2 SERPL-SCNC: 26 MMOL/L (ref 20–32)
CREAT SERPL-MCNC: 0.84 MG/DL (ref 0.66–1.25)
ERYTHROCYTE [DISTWIDTH] IN BLOOD BY AUTOMATED COUNT: 18.3 % (ref 10–15)
GFR SERPL CREATININE-BSD FRML MDRD: >90 ML/MIN/1.7M2
GLUCOSE BLDC GLUCOMTR-MCNC: 110 MG/DL (ref 70–99)
GLUCOSE SERPL-MCNC: 140 MG/DL (ref 70–99)
HCT VFR BLD AUTO: 39.3 % (ref 40–53)
HGB BLD-MCNC: 12.9 G/DL (ref 13.3–17.7)
LACTATE BLD-SCNC: 2.1 MMOL/L (ref 0.7–2)
MAGNESIUM SERPL-MCNC: 2 MG/DL (ref 1.6–2.3)
MCH RBC QN AUTO: 28 PG (ref 26.5–33)
MCHC RBC AUTO-ENTMCNC: 32.8 G/DL (ref 31.5–36.5)
MCV RBC AUTO: 85 FL (ref 78–100)
PHOSPHATE SERPL-MCNC: 2 MG/DL (ref 2.5–4.5)
PLATELET # BLD AUTO: 111 10E9/L (ref 150–450)
POTASSIUM SERPL-SCNC: 4.2 MMOL/L (ref 3.4–5.3)
RBC # BLD AUTO: 4.6 10E12/L (ref 4.4–5.9)
SODIUM SERPL-SCNC: 146 MMOL/L (ref 133–144)
VANCOMYCIN SERPL-MCNC: 21 MG/L
WBC # BLD AUTO: 13.6 10E9/L (ref 4–11)

## 2017-10-05 PROCEDURE — 25000128 H RX IP 250 OP 636: Performed by: INTERNAL MEDICINE

## 2017-10-05 PROCEDURE — 80156 ASSAY CARBAMAZEPINE TOTAL: CPT | Performed by: HOSPITALIST

## 2017-10-05 PROCEDURE — 12000000 ZZH R&B MED SURG/OB

## 2017-10-05 PROCEDURE — A9270 NON-COVERED ITEM OR SERVICE: HCPCS | Mod: GY | Performed by: INTERNAL MEDICINE

## 2017-10-05 PROCEDURE — 99233 SBSQ HOSP IP/OBS HIGH 50: CPT | Performed by: INTERNAL MEDICINE

## 2017-10-05 PROCEDURE — 25000125 ZZHC RX 250: Performed by: HOSPITALIST

## 2017-10-05 PROCEDURE — A9270 NON-COVERED ITEM OR SERVICE: HCPCS | Mod: GY | Performed by: HOSPITALIST

## 2017-10-05 PROCEDURE — 99207 ZZC CDG-MDM COMPONENT: MEETS MODERATE - UP CODED: CPT | Performed by: INTERNAL MEDICINE

## 2017-10-05 PROCEDURE — 25000132 ZZH RX MED GY IP 250 OP 250 PS 637: Mod: GY | Performed by: HOSPITALIST

## 2017-10-05 PROCEDURE — 00000146 ZZHCL STATISTIC GLUCOSE BY METER IP

## 2017-10-05 PROCEDURE — 25000128 H RX IP 250 OP 636: Performed by: HOSPITALIST

## 2017-10-05 PROCEDURE — 83735 ASSAY OF MAGNESIUM: CPT | Performed by: INTERNAL MEDICINE

## 2017-10-05 PROCEDURE — 80048 BASIC METABOLIC PNL TOTAL CA: CPT | Performed by: INTERNAL MEDICINE

## 2017-10-05 PROCEDURE — 25000132 ZZH RX MED GY IP 250 OP 250 PS 637: Mod: GY | Performed by: INTERNAL MEDICINE

## 2017-10-05 PROCEDURE — 85027 COMPLETE CBC AUTOMATED: CPT | Performed by: INTERNAL MEDICINE

## 2017-10-05 PROCEDURE — 99211 OFF/OP EST MAY X REQ PHY/QHP: CPT

## 2017-10-05 PROCEDURE — 36415 COLL VENOUS BLD VENIPUNCTURE: CPT | Performed by: HOSPITALIST

## 2017-10-05 PROCEDURE — 90686 IIV4 VACC NO PRSV 0.5 ML IM: CPT | Performed by: INTERNAL MEDICINE

## 2017-10-05 PROCEDURE — 80202 ASSAY OF VANCOMYCIN: CPT | Performed by: HOSPITALIST

## 2017-10-05 PROCEDURE — 36415 COLL VENOUS BLD VENIPUNCTURE: CPT | Performed by: INTERNAL MEDICINE

## 2017-10-05 PROCEDURE — 83605 ASSAY OF LACTIC ACID: CPT | Performed by: INTERNAL MEDICINE

## 2017-10-05 PROCEDURE — 27210429 ZZH NUTRITION PRODUCT INTERMEDIATE LITER

## 2017-10-05 PROCEDURE — 84100 ASSAY OF PHOSPHORUS: CPT | Performed by: INTERNAL MEDICINE

## 2017-10-05 PROCEDURE — 40000894 ZZH STATISTIC OT IP EVAL DEFER: Performed by: OCCUPATIONAL THERAPIST

## 2017-10-05 RX ORDER — HYDROCORTISONE 10 MG/1
10 TABLET ORAL EVERY EVENING
Status: DISCONTINUED | OUTPATIENT
Start: 2017-10-05 | End: 2017-10-06

## 2017-10-05 RX ORDER — HYDROCORTISONE 20 MG/1
20 TABLET ORAL EVERY MORNING
Status: DISCONTINUED | OUTPATIENT
Start: 2017-10-06 | End: 2017-10-07 | Stop reason: HOSPADM

## 2017-10-05 RX ADMIN — ENOXAPARIN SODIUM 40 MG: 40 INJECTION SUBCUTANEOUS at 08:02

## 2017-10-05 RX ADMIN — CIPROFLOXACIN 400 MG: 2 INJECTION, SOLUTION INTRAVENOUS at 10:58

## 2017-10-05 RX ADMIN — BRIVARACETAM 100 MG: 50 TABLET, FILM COATED ORAL at 20:27

## 2017-10-05 RX ADMIN — CARBAMAZEPINE 150 MG: 100 SUSPENSION ORAL at 19:23

## 2017-10-05 RX ADMIN — VITAMIN D, TAB 1000IU (100/BT) 2000 UNITS: 25 TAB at 08:01

## 2017-10-05 RX ADMIN — HYDROCORTISONE 10 MG: 10 TABLET ORAL at 20:27

## 2017-10-05 RX ADMIN — CARBAMAZEPINE 150 MG: 100 SUSPENSION ORAL at 01:01

## 2017-10-05 RX ADMIN — CALCIUM CARBONATE 1250 MG: 1250 SUSPENSION ORAL at 13:21

## 2017-10-05 RX ADMIN — MEROPENEM 500 MG: 500 INJECTION, POWDER, FOR SOLUTION INTRAVENOUS at 16:19

## 2017-10-05 RX ADMIN — Medication 1 PACKET: at 22:03

## 2017-10-05 RX ADMIN — Medication 15 ML: at 08:02

## 2017-10-05 RX ADMIN — SODIUM PHOSPHATE, MONOBASIC, MONOHYDRATE 15 MMOL: 276; 142 INJECTION, SOLUTION INTRAVENOUS at 12:15

## 2017-10-05 RX ADMIN — POTASSIUM & SODIUM PHOSPHATES POWDER PACK 280-160-250 MG 1 PACKET: 280-160-250 PACK at 08:01

## 2017-10-05 RX ADMIN — MEROPENEM 500 MG: 500 INJECTION, POWDER, FOR SOLUTION INTRAVENOUS at 08:01

## 2017-10-05 RX ADMIN — MEROPENEM 500 MG: 500 INJECTION, POWDER, FOR SOLUTION INTRAVENOUS at 02:57

## 2017-10-05 RX ADMIN — SODIUM CHLORIDE: 9 INJECTION, SOLUTION INTRAVENOUS at 06:03

## 2017-10-05 RX ADMIN — Medication 1 PACKET: at 16:20

## 2017-10-05 RX ADMIN — CARBAMAZEPINE 150 MG: 100 SUSPENSION ORAL at 06:42

## 2017-10-05 RX ADMIN — Medication 40 MG: at 08:01

## 2017-10-05 RX ADMIN — LEVOTHYROXINE SODIUM 150 MCG: 150 TABLET ORAL at 06:45

## 2017-10-05 RX ADMIN — CARBAMAZEPINE 150 MG: 100 SUSPENSION ORAL at 13:21

## 2017-10-05 RX ADMIN — CALCIUM CARBONATE 1250 MG: 1250 SUSPENSION ORAL at 17:02

## 2017-10-05 RX ADMIN — BRIVARACETAM 100 MG: 50 TABLET, FILM COATED ORAL at 08:01

## 2017-10-05 RX ADMIN — HYDROCORTISONE SODIUM SUCCINATE 50 MG: 100 INJECTION, POWDER, FOR SOLUTION INTRAMUSCULAR; INTRAVENOUS at 07:56

## 2017-10-05 RX ADMIN — CALCIUM CARBONATE 1250 MG: 1250 SUSPENSION ORAL at 08:02

## 2017-10-05 RX ADMIN — CIPROFLOXACIN 400 MG: 2 INJECTION, SOLUTION INTRAVENOUS at 22:34

## 2017-10-05 RX ADMIN — ASPIRIN 81 MG 81 MG: 81 TABLET ORAL at 08:01

## 2017-10-05 RX ADMIN — Medication 1 PACKET: at 11:49

## 2017-10-05 RX ADMIN — MELATONIN 6 MG: 3 TAB ORAL at 20:27

## 2017-10-05 RX ADMIN — VANCOMYCIN HYDROCHLORIDE 1500 MG: 5 INJECTION, POWDER, LYOPHILIZED, FOR SOLUTION INTRAVENOUS at 04:38

## 2017-10-05 RX ADMIN — MEROPENEM 500 MG: 500 INJECTION, POWDER, FOR SOLUTION INTRAVENOUS at 22:03

## 2017-10-05 RX ADMIN — POTASSIUM & SODIUM PHOSPHATES POWDER PACK 280-160-250 MG 1 PACKET: 280-160-250 PACK at 20:27

## 2017-10-05 RX ADMIN — Medication 2 G: at 17:01

## 2017-10-05 RX ADMIN — INFLUENZA A VIRUS A/MICHIGAN/45/2015 X-275 (H1N1) ANTIGEN (FORMALDEHYDE INACTIVATED), INFLUENZA A VIRUS A/HONG KONG/4801/2014 X-263B (H3N2) ANTIGEN (FORMALDEHYDE INACTIVATED), INFLUENZA B VIRUS B/PHUKET/3073/2013 ANTIGEN (FORMALDEHYDE INACTIVATED), AND INFLUENZA B VIRUS B/BRISBANE/60/2008 ANTIGEN (FORMALDEHYDE INACTIVATED) 0.5 ML: 15; 15; 15; 15 INJECTION, SUSPENSION INTRAMUSCULAR at 10:57

## 2017-10-05 NOTE — PROGRESS NOTES
[facility]    Hospitalist Progress Note    Assessment & Plan   Keyon Farias is a 57 year old male with past history TBI with right spastic hemiplegia, aphasia, dysphagia s/p G-tube, seizure disorder, panhypopit and V-tach/V-fib who presented with sepsis due to right middle and lower lobe pneumonia.     Severe sepsis due to right middle and lower lobe pneumonia  Acute hypoxic and hypercapnic respiratory failure  -Presented with fever (102), tachycardia (150's), tachypnea (40's), leukocytosis (17 with left shift) with -ABG demonstrating hypoxia and hypercapnea with CT chest showing right middle and lower lobe infiltrates.    -Lactate elevated at 10.  Given amikacin, linezolid and ciprofloxacin in ED given history of drug-resistant organisms (including VRE and Psuedomonas resistant to Zosyn and meropenem though these were isolated from pancreatic fluid).  -suspect this is most likely a typical aspiration pneumonia given dysphagia and mother reporting she gives him 3-4 bites daily  -continue with cipro, meropenem and vancomycin  -Appreciate infectious disease consult  -Lactate improved  -Discontinue stress dose steroids-resume prior to admission steroid regimen  -Off oxygen      Hyponatremia  Admission Na 132, this is slightly improved from recent baseline.  -Improved     TBI with aphasia, dysphagia s/p G-tube, right spastic hemiplegia  Able to respond to simple questions.  Nonverbal at baseline  -Continue tube feedings per nutrition consult     Seizure disorder  Thought due to TBI.  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  -continue  PTA hydrocortisone and PTA levothyroxine    Mild thrombocytopenia:  -Likely due to the septic process, platelets decreased to 111 today, monitor      D/W: RN  DVT Prophylaxis: Pneumatic Compression Devices  Code Status: Full Code    Disposition: Expected discharge  10/6    Migel Stoddard MD    Interval History   Continues to be afebrile.  Off oxygen.  No new complaints    -Data reviewed today: I reviewed all new labs and imaging results over the last 24 hours. I personally reviewed no images or EKG's today.    Physical Exam   Temp: 97.9  F (36.6  C) Temp src: Oral BP: 114/60 Pulse: 68 Heart Rate: 101 Resp: 18 SpO2: 94 % O2 Device: None (Room air)    Vitals:    10/04/17 0200   Weight: 76.5 kg (168 lb 10.4 oz)     Vital Signs with Ranges  Temp:  [97.4  F (36.3  C)-98.4  F (36.9  C)] 97.9  F (36.6  C)  Pulse:  [68-90] 68  Heart Rate:  [] 101  Resp:  [16-29] 18  BP: ()/(59-80) 114/60  SpO2:  [91 %-98 %] 94 %  I/O last 3 completed shifts:  In: 7042.5 [I.V.:6181.5; Other:250]  Out: 4450 [Urine:4450]    Constitutional: Awake, non-verbal  HEENT: Moist oral mucosa, No pallor or icterus  Neck- Supple, Good ROM, No JVD  Respiratory:  Few crackles bilaterally; Normal WOB  Cardiovascular: RRR, No murmur  GI: Soft, Non- tender, feeding tube in place  Skin/Integument: Warm and dry, no rashes  MSK: No joint deformity or swelling, no edema  Neuro: CN- grossly intact      Medications        aspirin  81 mg Per J Tube Daily     Brivaracetam  100 mg Oral BID     carBAMazepine  150 mg Oral Q6H     fiber modular  1 packet Per Feeding Tube TID     levothyroxine  150 mcg Per J Tube QAM AC     melatonin  6 mg Per J Tube QPM     multivitamins with minerals  15 mL Per J Tube Daily     potassium & sodium phosphates  1 packet Oral BID     cholecalciferol  2,000 Units Per G Tube Daily     enoxaparin  40 mg Subcutaneous Q24H     pantoprazole  40 mg Per NG tube Daily     calcium carbonate  1,250 mg Oral TID w/meals     ciprofloxacin  400 mg Intravenous Q12H     meropenem  500 mg Intravenous Q6H     vancomycin (VANCOCIN) IV  1,500 mg Intravenous Q12H     hydrocortisone sodium succinate PF  50 mg Intravenous Q8H     influenza quadrivalent (PF) vacc age 3 yrs and older  0.5 mL Intramuscular Prior  to discharge     sodium chloride (PF)  3 mL Intracatheter Q8H       Data     Recent Labs  Lab 10/05/17  0815 10/04/17  0610 10/03/17  2151   WBC 13.6* 17.6* 17.0*   HGB 12.9* 14.6 16.9   MCV 85 83 85   * 122* 175   * 135 132*   POTASSIUM 4.2 4.2 5.2   CHLORIDE 113* 101 95   CO2 26 25 23   BUN 14 14 16   CR 0.84 0.98 0.95   ANIONGAP 7 9 14   STEVE 8.3* 8.7 9.7   * 98 70   ALBUMIN  --   --  3.8   PROTTOTAL  --   --  9.2*   BILITOTAL  --   --  0.4   ALKPHOS  --   --  91   ALT  --   --  36   AST  --   --  43   LIPASE  --   --  299       No results found for this or any previous visit (from the past 24 hour(s)).

## 2017-10-05 NOTE — PROGRESS NOTES
[facility]  Infectious Disease Progress Note          Assessment and Plan:   IMPRESSION:   1.  A 57-year-old male with traumatic brain injury and spastic hemiplegia, admitted with acute fever, hypoxia and found to have infiltrates, consistent with pneumonia, very likely aspiration pneumonia.   2.  Prior history of dysphagia and aspiration pneumonia, previous pneumonias with pseudomonas lung colonization but last was late in 2016.   3.  Necrotizing pancreatitis earlier this year with resistant organisms including a highly resistant pseudomonas and vancomycin-resistant enterococcus, no symptoms of that currently.   4.  Traumatic brain injury with significant aphagia, has a G-tube in place.   5.  Seizure disorder.  6 MRSA colonized incl now       RECOMMENDATIONS:  1 Continue meropenem, vancomycin and Cipro for now based on prior micro and likely micro, is clinically rapidly improved and likely not to have cultures, so probably simplify as clinical picture allows, po as soon as tomorrow assuming still doing well and BC neg.             Interval History:   no new complaints and doing well; no + cxs, MRSA + nares, fever resolved off O2, WBC 17 K              Medications:       aspirin  81 mg Per J Tube Daily     Brivaracetam  100 mg Oral BID     carBAMazepine  150 mg Oral Q6H     fiber modular  1 packet Per Feeding Tube TID     levothyroxine  150 mcg Per J Tube QAM AC     melatonin  6 mg Per J Tube QPM     multivitamins with minerals  15 mL Per J Tube Daily     potassium & sodium phosphates  1 packet Oral BID     cholecalciferol  2,000 Units Per G Tube Daily     enoxaparin  40 mg Subcutaneous Q24H     pantoprazole  40 mg Per NG tube Daily     calcium carbonate  1,250 mg Oral TID w/meals     ciprofloxacin  400 mg Intravenous Q12H     meropenem  500 mg Intravenous Q6H     vancomycin (VANCOCIN) IV  1,500 mg Intravenous Q12H     hydrocortisone sodium succinate PF  50 mg Intravenous Q8H     influenza  quadrivalent (PF) vacc age 3 yrs and older  0.5 mL Intramuscular Prior to discharge     sodium chloride (PF)  3 mL Intracatheter Q8H                  Physical Exam:   Blood pressure 114/60, pulse 68, temperature 97.9  F (36.6  C), temperature source Oral, resp. rate 18, weight 76.5 kg (168 lb 10.4 oz), SpO2 94 %.  Wt Readings from Last 2 Encounters:   10/04/17 76.5 kg (168 lb 10.4 oz)   07/27/17 73.9 kg (163 lb)     Vital Signs with Ranges  Temp:  [97.4  F (36.3  C)-98.4  F (36.9  C)] 97.9  F (36.6  C)  Pulse:  [68-90] 68  Heart Rate:  [] 101  Resp:  [14-29] 18  BP: ()/(59-80) 114/60  SpO2:  [91 %-98 %] 94 %    Constitutional: Awake, alert, baseline neuro   Lungs: Clear to auscultation bilaterally, no crackles or wheezing   Cardiovascular: Regular rate and rhythm, normal S1 and S2, and no murmur noted   Abdomen: Normal bowel sounds, soft, non-distended, non-tender   Skin: No rashes, no cyanosis, no edema   Other:           Data:   All microbiology laboratory data reviewed.  Recent Labs   Lab Test  10/04/17   0610  10/03/17   2151  06/05/17   0640   WBC  17.6*  17.0*  12.4*   HGB  14.6  16.9  13.3   HCT  42.7  49.2  39.9*   MCV  83  85  73*   PLT  122*  175  161     Recent Labs   Lab Test  10/04/17   0610  10/03/17   2151  06/06/17   0710   CR  0.98  0.95  1.01     No lab results found.  Recent Labs   Lab Test  10/03/17   2205  10/03/17   2155  01/30/17   1453  01/23/17   1720  01/22/17   1430  01/22/17   0038  01/21/17   1608  01/21/17   1600  11/29/16   0836   CULT  No growth after 1 day  No growth after 1 day  Moderate growth Pseudomonas aeruginosa  Light growth Enterobacter cloacae complex  Moderate growth Candida albicans / dubliniensis Candida albicans and Candida   dubliniensis are not routinely speciated Susceptibility testing not routinely   done  Moderate growth Enterococcus faecium (VRE)  *  No anaerobes isolated  Canceled, Test credited Duplicate request PCR WAS ALREADY ORDERED  No anaerobes  isolated  Heavy growth Enterobacter cloacae complex  Heavy growth Enterococcus faecium (VRE)  Critical Value/Significant Value called to and read back by Phylicia Lamar RN, @   9404 01.24.2017 BL  *  No growth  No growth  No growth  Light growth Normal jaime  Heavy growth Pseudomonas aeruginosa  *

## 2017-10-05 NOTE — PLAN OF CARE
Problem: Patient Care Overview  Goal: Plan of Care/Patient Progress Review  PT-Attempted to see. Patient unable to provide prior level of assist and no family present. Attempted to call patients mom but unable to reach her today. Will call mother tomorrow for prior living situation.

## 2017-10-05 NOTE — PLAN OF CARE
Problem: Infection, Risk/Actual (Adult)  Goal: Identify Related Risk Factors and Signs and Symptoms  Related risk factors and signs and symptoms are identified upon initiation of Human Response Clinical Practice Guideline (CPG).   Outcome: Improving  Afebrile, no pain. Tele NSR. Fall risk-total care, turned and repositioned q 2hrs. Lerma is intact. Phosphorus is replaced. Receiving IV abx. TF runs til 2pm. Coccyx wound-WOCN consulted. Right sided hemiplegia. Heals are suspended. One soft brown stool today.

## 2017-10-05 NOTE — PHARMACY-VANCOMYCIN DOSING SERVICE
Pharmacy Vancomycin Note  Date of Service 2017  Patient's  1960   57 year old, male    Indication: Sepsis  Goal Trough Level: 15-20 mg/L  Day of Therapy: 2  Current Vancomycin regimen:  1500 mg IV q12h    Current estimated CrCl = Estimated Creatinine Clearance: 107.5 mL/min (based on Cr of 0.84).    Creatinine for last 3 days  10/3/2017:  9:51 PM Creatinine 0.95 mg/dL  10/4/2017:  6:10 AM Creatinine 0.98 mg/dL  10/5/2017:  8:15 AM Creatinine 0.84 mg/dL    Recent Vancomycin Levels (past 3 days)  10/5/2017:  3:15 PM Vancomycin Level 21.0 mg/L    Vancomycin IV Administrations (past 72 hours)                   vancomycin (VANCOCIN) 1500 mg in 0.9% NaCl 250 mL PREMIX (mg) 1,500 mg New Bag 10/05/17 0438     1,500 mg New Bag 10/04/17 1654     1,500 mg New Bag  0420                Nephrotoxins and other renal medications (Future)    Start     Dose/Rate Route Frequency Ordered Stop    10/06/17 0000  vancomycin (VANCOCIN) 1500 mg in 0.9% NaCl 250 mL PREMIX      1,500 mg  166.7 mL/hr over 90 Minutes Intravenous EVERY 18 HOURS 10/05/17 1651               Contrast Orders - past 72 hours (72h ago through future)    Start     Dose/Rate Route Frequency Ordered Stop    10/03/17 2234  iopamidol (ISOVUE-370) solution 82 mL      82 mL Intravenous ONCE 10/03/17 2233 10/03/17 2304          Interpretation of levels and current regimen:  Trough level is  Supratherapeutic    Has serum creatinine changed > 50% in last 72 hours: No    Urine output:  unable to determine    Renal Function: Stable    Plan:  1.  Decrease Dose to 1500 mg IV q18h  2.  Pharmacy will check trough levels as appropriate in 1-3 Days.    3. Serum creatinine levels will be ordered daily for the first week of therapy and at least twice weekly for subsequent weeks.      Jhoana Gil [facility]        .

## 2017-10-05 NOTE — PROGRESS NOTES
[facility] Nurse Inpatient Wound Assessment     Initial Assessment of wound(s) :  Linear area base of coccyx - suspected PI - community acquired                                                                                    Data:   Patient History:       Keyon Farias is a 57 year old male with past history TBI with right spastic hemiplegia, aphasia, dysphagia s/p G-tube, seizure disorder, panhypopit and V-tach/V-fib who presented with sepsis due to right middle and lower lobe pneumonia.        Moisture Management:  Lerma for UIC / Incontinence protocol for FIC    Catheter secured? Yes      Current Diet / Nutrition:            Active Diet Order: TF    Ricci Assessment and sub scores:  Ricci Score  Av.8  Min: 12  Max: 14         Labs          Recent Labs   Lab Test  10/05/17   0815   10/03/17   2151   17   0452   17   0228   16   0405   ALBUMIN   --    --   3.8   < >   --    --    --    < >  2.0*   HGB  12.9*   < >  16.9   < >  9.4*   < >   --    < >  7.6*   INR   --    --    --    --   1.27*   --    --    < >   --    WBC  13.6*   < >  17.0*   < >  11.7*   < >   --    < >  18.0*   A1C   --    --    --    --    --    --    --    --   5.1   CRP   --    --    --    --    --    --   87.0*   < >   --     < > = values in this interval not displayed.       Wound Assessment (location #2  Coccyx - community acquired  Wound History:       Wound Base: 100% pink dermis    Specific Dimensions (length x width x depth, in cm) butterfly-shaped area of dry  peeling epidermis, slightly blanchable over distal coccyx, remainder is blanchable.     Tunneling:  N/A    Undermining: N/A    Palpation of the wound bed:  firm    Slough appearance:  none    Eschar appearance:  none    Periwound Skin: intact,      Color:consistent with surrounding tissue    Temperature  normal    Drainage:  none    Odor: none        Intervention:     Patient's chart evaluated.      Wound(s) was assessed    Wound  Care: was done:   All Cleaned MicroKlenz                                                  Coccyx: Mepilex sacral                                                        Supplies  In floor supply room            Assessment:     Coccyx: Ricci risk but no identified PI @ this time.          Plan:     Nursing to notify the Provider(s) and re-consult the WOC Nurse if wound(s) deteriorate(s) or if the wound care plan needs reevaluation.       Coccyx:           -Change dressing on odd days         - Mepilex sacral         -PIP:   reinforce Rt/LT positioning in bed, HOB @ 30 degrees for TF; Heel suspension @ all times in bed;                    Ricci risk: document interventions; consider Pulsate mattress for extended stay: full skin                     Inspections BID including under tubes and reposition, monitor skin for development candida                          WOC Nurse will return: weekly and prn     Face to face time: 15 minutes

## 2017-10-05 NOTE — PROVIDER NOTIFICATION
MD Notification    Notified Person:  MD    Notified Persons Name: Dr. Costa    Notification Date/Time: 10/4/17 2006    Notification Interaction:  Talked with Physician    Purpose of Notification: Patient to be started on tube feeding tonight.  Do you want ongoing blood sugar orders beyond the 48 hour order?    Orders Received: No need for tonight.  Has BMP ordered for the morning.    Comments:

## 2017-10-05 NOTE — PLAN OF CARE
Problem: Patient Care Overview  Goal: Plan of Care/Patient Progress Review  Outcome: No Change  Alert.  Orientation unclear secondary to expressive aphasia.  Does yell out at times and during cares.  Left-sided neglect.  Total lift.  Seizure precautions initiated.  NPO.  On tube feeding via J-tube started at 2240.  IV's to left AC and left forearm running antibiotics and fluids respectively.  Indwelling Lerma with good output.  Turn repo Q2H.  Blanchable redness to coccyx; Mepilex in place for preventative protection.   VSS on RA.  Tele: NSR.  Cont pulse ox tolerated.  ID, PT, OT, and ST following.  Discharge pending.

## 2017-10-05 NOTE — PLAN OF CARE
Problem: Patient Care Overview  Goal: Plan of Care/Patient Progress Review  OT: Orders received and chart reviewed. Pt admitted due to sepsis with pneumonia. Spoke with pt's mother by phone. Pt has 24-hour supervision and assist and has 1 PCA and 3 nurses who care for him. Pt requires assist of 1 for transfers to w/c and back. Pt has assist with most all ADLs, except pt is able to brush teeth and wash face and helps with UB dressing. Otherwise pt has assistance with bathing, dressing, and all IADLs. Pt's mother reports that pt's splint for R hand is no longer functional and would like to have a new splint made for pt. Recommend OT at next level of care to assist with baseline with basic grooming and splint fabrication. Will complete IP OT orders and defer to next level of care.

## 2017-10-05 NOTE — PLAN OF CARE
Problem: Patient Care Overview  Goal: Plan of Care/Patient Progress Review  Outcome: No Change  Pt alert with expressive aphasia. VSS on RA ex tachy. Total cares, up with a lift. Turn and repo q2h. Mepilex on coccyx for protection,CDI, blanchable redness. Seizure precuations.  Lerma intact and patent. Sepsis BPA fired. LA 2.1- down from 3.1. Tube feeding via J-tube. Tele sinus tach. IVF infusing. IV antibiotics. ID, PT/OT, speech following. Continue to monitor.

## 2017-10-05 NOTE — PLAN OF CARE
Problem: Patient Care Overview  Goal: Plan of Care/Patient Progress Review  SLP: Patient admitted multiple times for aspiration pneumonia and well known to this department. He has a G-tube for nutrition and hydration and should remain NPO. Spoke with MD, and he is ok with completing the orders.

## 2017-10-06 ENCOUNTER — APPOINTMENT (OUTPATIENT)
Dept: PHYSICAL THERAPY | Facility: CLINIC | Age: 57
DRG: 871 | End: 2017-10-06
Attending: INTERNAL MEDICINE
Payer: MEDICARE

## 2017-10-06 LAB
ANION GAP SERPL CALCULATED.3IONS-SCNC: 6 MMOL/L (ref 3–14)
BUN SERPL-MCNC: 15 MG/DL (ref 7–30)
CALCIUM SERPL-MCNC: 8.3 MG/DL (ref 8.5–10.1)
CARBAMAZEPINE SERPL-MCNC: 16 MG/L (ref 4–12)
CHLORIDE SERPL-SCNC: 110 MMOL/L (ref 94–109)
CO2 SERPL-SCNC: 27 MMOL/L (ref 20–32)
CREAT SERPL-MCNC: 0.85 MG/DL (ref 0.66–1.25)
ERYTHROCYTE [DISTWIDTH] IN BLOOD BY AUTOMATED COUNT: 18.3 % (ref 10–15)
GFR SERPL CREATININE-BSD FRML MDRD: >90 ML/MIN/1.7M2
GLUCOSE BLDC GLUCOMTR-MCNC: 172 MG/DL (ref 70–99)
GLUCOSE SERPL-MCNC: 158 MG/DL (ref 70–99)
HCT VFR BLD AUTO: 37.6 % (ref 40–53)
HGB BLD-MCNC: 12.3 G/DL (ref 13.3–17.7)
MAGNESIUM SERPL-MCNC: 2.3 MG/DL (ref 1.6–2.3)
MCH RBC QN AUTO: 28 PG (ref 26.5–33)
MCHC RBC AUTO-ENTMCNC: 32.7 G/DL (ref 31.5–36.5)
MCV RBC AUTO: 86 FL (ref 78–100)
PHOSPHATE SERPL-MCNC: 3.2 MG/DL (ref 2.5–4.5)
PLATELET # BLD AUTO: 120 10E9/L (ref 150–450)
POTASSIUM SERPL-SCNC: 3.8 MMOL/L (ref 3.4–5.3)
RBC # BLD AUTO: 4.39 10E12/L (ref 4.4–5.9)
SODIUM SERPL-SCNC: 143 MMOL/L (ref 133–144)
WBC # BLD AUTO: 6.2 10E9/L (ref 4–11)

## 2017-10-06 PROCEDURE — 12000000 ZZH R&B MED SURG/OB

## 2017-10-06 PROCEDURE — 40000193 ZZH STATISTIC PT WARD VISIT: Performed by: PHYSICAL THERAPIST

## 2017-10-06 PROCEDURE — 85027 COMPLETE CBC AUTOMATED: CPT | Performed by: INTERNAL MEDICINE

## 2017-10-06 PROCEDURE — 83735 ASSAY OF MAGNESIUM: CPT | Performed by: INTERNAL MEDICINE

## 2017-10-06 PROCEDURE — A9270 NON-COVERED ITEM OR SERVICE: HCPCS | Mod: GY | Performed by: INTERNAL MEDICINE

## 2017-10-06 PROCEDURE — 25000128 H RX IP 250 OP 636: Performed by: INTERNAL MEDICINE

## 2017-10-06 PROCEDURE — 25000132 ZZH RX MED GY IP 250 OP 250 PS 637: Mod: GY | Performed by: HOSPITALIST

## 2017-10-06 PROCEDURE — 36415 COLL VENOUS BLD VENIPUNCTURE: CPT | Performed by: INTERNAL MEDICINE

## 2017-10-06 PROCEDURE — 97161 PT EVAL LOW COMPLEX 20 MIN: CPT | Mod: GP | Performed by: PHYSICAL THERAPIST

## 2017-10-06 PROCEDURE — 27210429 ZZH NUTRITION PRODUCT INTERMEDIATE LITER

## 2017-10-06 PROCEDURE — 25000128 H RX IP 250 OP 636: Performed by: HOSPITALIST

## 2017-10-06 PROCEDURE — 84100 ASSAY OF PHOSPHORUS: CPT | Performed by: INTERNAL MEDICINE

## 2017-10-06 PROCEDURE — A9270 NON-COVERED ITEM OR SERVICE: HCPCS | Mod: GY | Performed by: HOSPITALIST

## 2017-10-06 PROCEDURE — 80156 ASSAY CARBAMAZEPINE TOTAL: CPT | Performed by: INTERNAL MEDICINE

## 2017-10-06 PROCEDURE — 25000132 ZZH RX MED GY IP 250 OP 250 PS 637: Mod: GY | Performed by: INTERNAL MEDICINE

## 2017-10-06 PROCEDURE — 00000146 ZZHCL STATISTIC GLUCOSE BY METER IP

## 2017-10-06 PROCEDURE — 80048 BASIC METABOLIC PNL TOTAL CA: CPT | Performed by: INTERNAL MEDICINE

## 2017-10-06 PROCEDURE — 99207 ZZC CDG-MDM COMPONENT: MEETS MODERATE - UP CODED: CPT | Performed by: INTERNAL MEDICINE

## 2017-10-06 PROCEDURE — 99233 SBSQ HOSP IP/OBS HIGH 50: CPT | Performed by: INTERNAL MEDICINE

## 2017-10-06 RX ORDER — HYDROCORTISONE 10 MG/1
10 TABLET ORAL EVERY EVENING
Status: DISCONTINUED | OUTPATIENT
Start: 2017-10-06 | End: 2017-10-07 | Stop reason: HOSPADM

## 2017-10-06 RX ADMIN — CARBAMAZEPINE 150 MG: 100 SUSPENSION ORAL at 00:23

## 2017-10-06 RX ADMIN — ENOXAPARIN SODIUM 40 MG: 40 INJECTION SUBCUTANEOUS at 08:13

## 2017-10-06 RX ADMIN — Medication 15 ML: at 08:14

## 2017-10-06 RX ADMIN — CALCIUM CARBONATE 1250 MG: 1250 SUSPENSION ORAL at 08:13

## 2017-10-06 RX ADMIN — POTASSIUM & SODIUM PHOSPHATES POWDER PACK 280-160-250 MG 1 PACKET: 280-160-250 PACK at 08:13

## 2017-10-06 RX ADMIN — CIPROFLOXACIN 400 MG: 2 INJECTION, SOLUTION INTRAVENOUS at 11:40

## 2017-10-06 RX ADMIN — MEROPENEM 500 MG: 500 INJECTION, POWDER, FOR SOLUTION INTRAVENOUS at 20:22

## 2017-10-06 RX ADMIN — Medication 1 PACKET: at 22:27

## 2017-10-06 RX ADMIN — VITAMIN D, TAB 1000IU (100/BT) 2000 UNITS: 25 TAB at 08:13

## 2017-10-06 RX ADMIN — MELATONIN 6 MG: 3 TAB ORAL at 20:08

## 2017-10-06 RX ADMIN — MEROPENEM 500 MG: 500 INJECTION, POWDER, FOR SOLUTION INTRAVENOUS at 08:14

## 2017-10-06 RX ADMIN — MEROPENEM 500 MG: 500 INJECTION, POWDER, FOR SOLUTION INTRAVENOUS at 14:48

## 2017-10-06 RX ADMIN — VANCOMYCIN HYDROCHLORIDE 1500 MG: 5 INJECTION, POWDER, LYOPHILIZED, FOR SOLUTION INTRAVENOUS at 18:38

## 2017-10-06 RX ADMIN — CALCIUM CARBONATE 1250 MG: 1250 SUSPENSION ORAL at 14:05

## 2017-10-06 RX ADMIN — Medication 1 PACKET: at 16:51

## 2017-10-06 RX ADMIN — CARBAMAZEPINE 150 MG: 100 SUSPENSION ORAL at 06:54

## 2017-10-06 RX ADMIN — ASPIRIN 81 MG 81 MG: 81 TABLET ORAL at 08:13

## 2017-10-06 RX ADMIN — MEROPENEM 500 MG: 500 INJECTION, POWDER, FOR SOLUTION INTRAVENOUS at 03:15

## 2017-10-06 RX ADMIN — BRIVARACETAM 100 MG: 50 TABLET, FILM COATED ORAL at 20:08

## 2017-10-06 RX ADMIN — HYDROCORTISONE 20 MG: 20 TABLET ORAL at 08:13

## 2017-10-06 RX ADMIN — Medication 1 PACKET: at 08:14

## 2017-10-06 RX ADMIN — CIPROFLOXACIN 400 MG: 2 INJECTION, SOLUTION INTRAVENOUS at 22:27

## 2017-10-06 RX ADMIN — HYDROCORTISONE 10 MG: 10 TABLET ORAL at 20:08

## 2017-10-06 RX ADMIN — Medication 40 MG: at 11:40

## 2017-10-06 RX ADMIN — VANCOMYCIN HYDROCHLORIDE 1500 MG: 5 INJECTION, POWDER, LYOPHILIZED, FOR SOLUTION INTRAVENOUS at 00:22

## 2017-10-06 RX ADMIN — LEVOTHYROXINE SODIUM 150 MCG: 150 TABLET ORAL at 06:35

## 2017-10-06 RX ADMIN — CALCIUM CARBONATE 1250 MG: 1250 SUSPENSION ORAL at 18:38

## 2017-10-06 RX ADMIN — POTASSIUM & SODIUM PHOSPHATES POWDER PACK 280-160-250 MG 1 PACKET: 280-160-250 PACK at 20:08

## 2017-10-06 RX ADMIN — BRIVARACETAM 100 MG: 50 TABLET, FILM COATED ORAL at 08:13

## 2017-10-06 NOTE — PROGRESS NOTES
[facility]  Infectious Disease Progress Note          Assessment and Plan:   IMPRESSION:   1.  A 57-year-old male with traumatic brain injury and spastic hemiplegia, admitted with acute fever, hypoxia and found to have infiltrates, consistent with pneumonia, very likely aspiration pneumonia.   2.  Prior history of dysphagia and aspiration pneumonia, previous pneumonias with pseudomonas lung colonization but last was late in 2016.   3.  Necrotizing pancreatitis earlier this year with resistant organisms including a highly resistant pseudomonas and vancomycin-resistant enterococcus, no symptoms of that currently.   4.  Traumatic brain injury with significant aphagia, has a G-tube in place.   5.  Seizure disorder.  6 MRSA colonized incl now       RECOMMENDATIONS:  1 Cultures neg, rapidly better, same while here  But OK with me disposition enteral cipro 500 bid and minocin 100 bid for 7 days        Interval History:   no new complaints and doing well; no + cxs, MRSA + nares, fever resolved off O2, WBC 6 K              Medications:       hydrocortisone  10 mg Oral or Feeding Tube QPM     hydrocortisone  20 mg Per J Tube QAM     vancomycin (VANCOCIN) IV  1,500 mg Intravenous Q18H     aspirin  81 mg Per J Tube Daily     Brivaracetam  100 mg Oral BID     fiber modular  1 packet Per Feeding Tube TID     levothyroxine  150 mcg Per J Tube QAM AC     melatonin  6 mg Per J Tube QPM     multivitamins with minerals  15 mL Per J Tube Daily     potassium & sodium phosphates  1 packet Oral BID     cholecalciferol  2,000 Units Per G Tube Daily     enoxaparin  40 mg Subcutaneous Q24H     pantoprazole  40 mg Per NG tube Daily     calcium carbonate  1,250 mg Oral TID w/meals     ciprofloxacin  400 mg Intravenous Q12H     meropenem  500 mg Intravenous Q6H     sodium chloride (PF)  3 mL Intracatheter Q8H                  Physical Exam:   Blood pressure 122/62, pulse 73, temperature 97.4  F (36.3  C), resp. rate 18,  weight 76.5 kg (168 lb 10.4 oz), SpO2 95 %.  Wt Readings from Last 2 Encounters:   10/04/17 76.5 kg (168 lb 10.4 oz)   07/27/17 73.9 kg (163 lb)     Vital Signs with Ranges  Temp:  [97.4  F (36.3  C)-98  F (36.7  C)] 97.4  F (36.3  C)  Pulse:  [65-73] 73  Heart Rate:  [87] 87  Resp:  [18] 18  BP: (116-122)/(56-62) 122/62  SpO2:  [94 %-97 %] 95 %    Constitutional: Awake, alert, baseline neuro   Lungs: Clear to auscultation bilaterally, no crackles or wheezing   Cardiovascular: Regular rate and rhythm, normal S1 and S2, and no murmur noted   Abdomen: Normal bowel sounds, soft, non-distended, non-tender   Skin: No rashes, no cyanosis, no edema   Other:           Data:   All microbiology laboratory data reviewed.  Recent Labs   Lab Test  10/06/17   0825  10/05/17   0815  10/04/17   0610   WBC  6.2  13.6*  17.6*   HGB  12.3*  12.9*  14.6   HCT  37.6*  39.3*  42.7   MCV  86  85  83   PLT  120*  111*  122*     Recent Labs   Lab Test  10/06/17   0825  10/05/17   0815  10/04/17   0610   CR  0.85  0.84  0.98     No lab results found.  Recent Labs   Lab Test  10/03/17   2205  10/03/17   2155  01/30/17   1453  01/23/17   1720  01/22/17   1430  01/22/17   0038  01/21/17   1608  01/21/17   1600  11/29/16   0836   CULT  No growth after 2 days  No growth after 2 days  Moderate growth Pseudomonas aeruginosa  Light growth Enterobacter cloacae complex  Moderate growth Candida albicans / dubliniensis Candida albicans and Candida   dubliniensis are not routinely speciated Susceptibility testing not routinely   done  Moderate growth Enterococcus faecium (VRE)  *  No anaerobes isolated  Canceled, Test credited Duplicate request PCR WAS ALREADY ORDERED  No anaerobes isolated  Heavy growth Enterobacter cloacae complex  Heavy growth Enterococcus faecium (VRE)  Critical Value/Significant Value called to and read back by Phylicia Lamar RN, @   6666 01.24.2017 BL  *  No growth  No growth  No growth  Light growth Normal jaime  Heavy growth  Pseudomonas aeruginosa  *

## 2017-10-06 NOTE — PLAN OF CARE
Problem: Patient Care Overview  Goal: Plan of Care/Patient Progress Review  Outcome: No Change  Afebrile, no signs of pain. Fall risk- up with a ceiling lift. Tele discontinued. Right sided hemiplegia. Turned and repositioned q 2hrs. Lerma is intact. Carbamazepine discontinued as levels were high. TF clamped. Diminished lung sounds. No signs of SOB. On room air. Receiving abx. Dressing on coccyx intact.

## 2017-10-06 NOTE — PLAN OF CARE
Problem: Patient Care Overview  Goal: Plan of Care/Patient Progress Review  PT-Read CC's note about home situation and after evaluation called patients mother to verify if one person or 2 transfer Keyon typically. His mom reported patient was able to transfer with just assist of her until about a year ago. It sounds like he was hospitalized for a long time and a lift was used consistently while he was hospitalized. Therefore when he returned home he needed more assist to transfer. She reports his male caregivers can transfer him alone but he has a female caregiver on Mondays and she needs a second person to assist with the transfer. She reports once up, patient used to propel his w/c with his left arm and left leg with his right leg propped over the left but is just now getting back to doing that again.   Discharge Planner PT   Patient plan for discharge: Home with assist of mom as well and 11.5 hours a day of nursing care and PCA per mom. Would be agreeable to HHPT also.   Current status: Patient needed max assist of 1 to get to sitting position. Performed pivot transfer to the w/c with max assist of 1 and mod assist of 1. Left up in chair with chair alarm under him.   Barriers to return to prior living situation: With the amount of assist he has at home and the fact that the caregivers are very familiar with Keyon do not feel there are barriers to home.   Recommendations for discharge: Home with continued assist of PCA and nurse and additional assist of HHPT.   Rationale for recommendations: Feel patient would benefit from HHPT to address strengthening and transfers as he has been more independent in the past and strengthening and transfer training would decrease caregiver burden and maximize patients ability to assist.      Entered by: Marika Villalpando 10/06/2017 2:55 PM

## 2017-10-06 NOTE — PROGRESS NOTES
10/06/17 1501   Quick Adds   Type of Visit Initial PT Evaluation   Living Environment   Lives With parent(s)   Living Arrangements house   Home Accessibility no concerns   Living Environment Comment Patients mom reports Keyon could transfers with assist of just her until her was hospitalized last year for a long time and a lift was used at the hospital to transfer him all the time. Since he has been home he has 11.5 hours of PCA and nursing assist/day and the male caregivers transfer him with assist of one and female caregivers need assist of 2.    Self-Care   Dominant Hand left   Usual Activity Tolerance fair   Current Activity Tolerance fair   Functional Level Prior   Ambulation 4-->completely dependent   Transferring 2-->assistive person   Toileting 2-->assistive person   Bathing 3-->assistive equipment and person   Communication 3-->unable to speak (not related to language barrier)   Cognition 2 - difficulty with organizing thoughts   General Information   Onset of Illness/Injury or Date of Surgery - Date 10/04/17   Referring Physician Manish Meier   Patient/Family Goals Statement Per patients mom, she will take him home with continued assist from  nursing and PCA's.    Pertinent History of Current Problem (include personal factors and/or comorbidities that impact the POC) Keyon Farias is a 57 year old male with a history of TBI with resultant seizure disorder, aphasia, right sided hemiplegia, and recent treatment for necrotizing pancreatitis who presents to the emergency department today for evaluation of respiratory distress, fever and change in mental status    Precautions/Limitations fall precautions   Weight-Bearing Status - LUE full weight-bearing   Weight-Bearing Status - RUE full weight-bearing   Weight-Bearing Status - LLE full weight-bearing   Weight-Bearing Status - RLE full weight-bearing   General Observations Patient unable to verbalize but can make some of his needs known through actions.  Appears to understand to some degrees.    Cognitive Status Examination   Level of Consciousness alert   Follows Commands and Answers Questions able to follow single-step instructions   Cognitive Comment Difficult to assess orientation and memory due to inability to speak, but patient able to follow some very simple commands with left UE and LE and nodded yes and no appropriately.    Posture    Posture Comments Spine rounded and forward head. Tends to lean right.    Range of Motion (ROM)   ROM Comment Keeps right arm flexed by his side and indicated pain if therapist moved it at all. Bilateral LE's with limited DF.    Strength   Strength Comments Able to move left UE and LE against gravity. Did not dorsiflex left foot so question if able. Appears somewhat stuck in plantarflexion.    Bed Mobility   Bed Mobility Comments Max assist of 1 for sup to sit.    Transfer Skills   Transfer Comments Transferred via stand pivot from bed to w/c with max assist of 1 and mod assist of 1. Difficult to get into partial standing position.    Balance   Balance Comments Able to maintain static sitting balance for brief periods but unable to regain balance if he loses it.    Muscle Tone   Muscle Tone Comments Increased in right UE and LE.    Clinical Impression   Criteria for Skilled Therapeutic Intervention evaluation only   PT Diagnosis Impaired independence with all mobility   Influenced by the following impairments Generalized weakness throughout. Right hemiparesis, impaired ability to follow all commands   Functional limitations due to impairments Needs assist for all mobility   Clinical Presentation Stable/Uncomplicated   Clinical Decision Making (Complexity) Low complexity   Therapy Frequency` (No further inpatient therapy as patient d/c planned for jae)   Predicted Duration of Therapy Intervention (days/wks) No further inpatient PT as patient anticipated discharge is for tomorrow.   Anticipated Discharge Disposition Home with  "Home Therapy  (Feel pateint would benefit from home PT to maximize independ)   Risk & Benefits of therapy have been explained Yes   Patient, Family & other staff in agreement with plan of care Yes   [facility] TM \"6 Clicks\"   2016, Trustees of [facility], under license to [employer].  All rights reserved.   6 Clicks Short Forms Basic Mobility Inpatient Short Form   [facility]  \"6 Clicks\" V.2 Basic Mobility Inpatient Short Form   1. Turning from your back to your side while in a flat bed without using bedrails? 2 - A Lot   2. Moving from lying on your back to sitting on the side of a flat bed without using bedrails? 2 - A Lot   3. Moving to and from a bed to a chair (including a wheelchair)? 2 - A Lot   4. Standing up from a chair using your arms (e.g., wheelchair, or bedside chair)? 2 - A Lot   5. To walk in hospital room? 1 - Total   6. Climbing 3-5 steps with a railing? 1 - Total   Basic Mobility Raw Score (Score out of 24.Lower scores equate to lower levels of function) 10   Total Evaluation Time   Total Evaluation Time (Minutes) 30     "

## 2017-10-06 NOTE — PROGRESS NOTES
CLINICAL NUTRITION SERVICES - REASSESSMENT NOTE      EVALUATION OF PROGRESS TOWARD GOALS   Diet:  NPO  Nutrition Support:  - Nocturnal TF Isosource 1.5 at 90 mL/hr x 14 hrs (from 10 pm - 2 pm + hold 1 hr before and 1 hr after Synthroid per mother's request) = 1890 kcals, 86 gm pro (1.1 gm/kg and 93% protein needs), 958 mL H20, 19 gm fiber, 222 gm CHO  - NutriSource Fiber 3 packets per day = 45 kcals, 9 gm fiber  - Total (TF + NutriSource Fiber):  1935 kcals (25 kcal/kg), 28 gm fiber.  - H2O flushes to 150 mL q 4 hours for total fluid (TF + flushes) = 1850 mL/day.    Intake:  Pt continues to tolerate noc TF without issues.  BM x 2 past 24 hours      ASSESSED NUTRITION NEEDS  (Dosing Weight:  76.5 kg - admit wt)  Estimated Energy Needs: 1916-7260 kcals (25-30 Kcal/Kg) - maintenance  Estimated Protein Needs:   grams protein (1.2-1.5 g pro/Kg) - Repletion  Estimated Fluid Needs:  4013-7633 mL (1 mL/Kcal) - maintenance    NEW FINDINGS:   WOCN 10/5: Coccyx: Ricci risk but no identified PI @ this time  No recent weight  Pt continues on VIt D, Certavite, and Neutra-Phos    Previous Goals:   Nocturnal TF will meet % estimated needs.  Evaluation: Met    Previous Nutrition Diagnosis:   Inadequate protein-energy intake related to TF held and severe dysphagia as evidenced by meeting 0% needs with plan for TF resumption   Evaluation: Resolved      CURRENT NUTRITION DIAGNOSIS  No nutrition diagnosis identified at this time     INTERVENTIONS  Recommendations / Nutrition Prescription  Continue current TF, as at home    Implementation  No interventions at this time    Goals  Same: Nocturnal TF will meet % estimated needs.      MONITORING AND EVALUATION:  Progress towards goals will be monitored and evaluated per protocol and Practice Guidelines    Unique Schultz RD  Pager [phone] (M-F)            [phone] (W/E & Hol)

## 2017-10-06 NOTE — PROGRESS NOTES
"Care Coordinator reviewed pt's records and then called his pt's Mother Savannah yesterday afternoon.  She is aware that pt may discharge today.  A little bit about pt's history is he was able to walk independently with a walker up until 8/2016 when started having more seizure activity.  His baseline is now able to bear weight and pivot transfer.  Savannah shared that pt is allotted 11.5 hrs per day for Nursing and PCA thru [facility] and his home agency is [facility].  Savannah shared that they have had difficulty staffing, so some days go unfilled and she is his caregiver.  She transports pt to appointments in her car with the help of the home care nurse.  Nsg is noting up with a lift.  Pt will need to pivot transfer, as they do not have a lift at home.  Savannah is not interested in short term rehab, as she noted \"they practically killed him\".  Will await PT evaluation and connect with his home care agency this AM.    Addendum:  Care Coordinator has alerted pt's mother that pt will not discharge today due to high Tegretol level.  She would like to talk with [facility] Hospitalist when she visits this afternoon.  "

## 2017-10-06 NOTE — PLAN OF CARE
Problem: Patient Care Overview  Goal: Plan of Care/Patient Progress Review  Outcome: No Change  Pt alert, non-verbal, cooperative. VSS on RA, afebrile. denies pain. Fall risk-total care, up with a lift, turn and repo q2h. NPO, TF running until 2pm. Coccyx wound covered with mepilex. Heels suspended. Lerma intact and patent. Tele NSR. ID/PT/OT. IV abx. Possible discharge home with home care today.

## 2017-10-06 NOTE — PLAN OF CARE
Problem: Patient Care Overview  Goal: Plan of Care/Patient Progress Review  Outcome: Improving  Pt non-verbal, calm/cooperative. Right sided hemiplegia.Lift. Fall risk-total care, turned and repositioned q 2hrs. Afebrile, no pain. Tele NSR. Lerma is intact with good output. Phosphorus and Mg replaced; labs at am. Receiving IV abx. TF runs 22-14, NPO. Coccyx wound-WOCN consulted. Heals are suspended. D/c pending home with RN/PCA.

## 2017-10-06 NOTE — DISCHARGE INSTRUCTIONS
The following appointment has been scheduled for you:  Dr Gomez  Tuyazday 10/10/17 at 2:45pm  [facility]  [phone]    [facility] phone: [phone] or [phone]

## 2017-10-06 NOTE — PROGRESS NOTES
[facility]    Hospitalist Progress Note    Assessment & Plan   Keyon Farias is a 57 year old male with past history TBI with right spastic hemiplegia, aphasia, dysphagia s/p G-tube, seizure disorder, panhypopit and V-tach/V-fib who presented with sepsis due to right middle and lower lobe pneumonia.     Severe sepsis due to right middle and lower lobe pneumonia  Acute hypoxic and hypercapnic respiratory failure  -Presented with fever (102), tachycardia (150's), tachypnea (40's), leukocytosis (17 with left shift) with -ABG demonstrating hypoxia and hypercapnea with CT chest showing right middle and lower lobe infiltrates.    -Lactate elevated at 10.  Given amikacin, linezolid and ciprofloxacin in ED given history of drug-resistant organisms (including VRE and Psuedomonas resistant to Zosyn and meropenem though these were isolated from pancreatic fluid).  -suspect this is most likely a typical aspiration pneumonia given dysphagia and mother reporting she gives him 3-4 bites daily  -Cultures negative  -Appreciate infectious disease consult; switch to oral Cipro and minocycline at discharge  -Off oxygen   -Discontinue telemetry     TBI with aphasia, dysphagia s/p G-tube, right spastic hemiplegia  Able to respond to simple questions.  Nonverbal at baseline  -Continue tube feedings per nutrition consult     Seizure disorder/supratherapeutic carbamazepine level  Thought due to TBI.  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  -continue  PTA hydrocortisone and PTA levothyroxine    Mild thrombocytopenia:  -Likely due to the septic process, platelets stable at 120      D/W: RN  DVT Prophylaxis: Pneumatic Compression Devices  Code Status: Full Code    Disposition: Expected  discharge 10/7    Migel Stoddard MD    Interval History   Afebrile, no new complaints.  Tegretol level high    -Data reviewed today: I reviewed all new labs and imaging results over the last 24 hours. I personally reviewed no images or EKG's today.    Physical Exam   Temp: 97.4  F (36.3  C) Temp src: Oral BP: 122/62 Pulse: 73 Heart Rate: 87 Resp: 18 SpO2: 95 % O2 Device: None (Room air)    Vitals:    10/04/17 0200   Weight: 76.5 kg (168 lb 10.4 oz)     Vital Signs with Ranges  Temp:  [97.4  F (36.3  C)-98  F (36.7  C)] 97.4  F (36.3  C)  Pulse:  [65-73] 73  Heart Rate:  [87] 87  Resp:  [18] 18  BP: (116-122)/(56-62) 122/62  SpO2:  [94 %-97 %] 95 %  I/O last 3 completed shifts:  In: 2486 [I.V.:900; NG/GT:975]  Out: 2325 [Urine:2325]    Constitutional: Awake, verbalizes one or two and comprehensible words-Baseline  HEENT: Moist oral mucosa, No pallor or icterus  Neck- Supple, Good ROM, No JVD  Respiratory:  Clear to auscultation; Normal WOB  Cardiovascular: RRR, No murmur  GI: Soft, Non- tender, feeding tube in place  Skin/Integument: Warm and dry, no rashes  MSK: No joint deformity or swelling, no edema  Neuro: CN- grossly intact      Medications        hydrocortisone  10 mg Oral or Feeding Tube QPM     hydrocortisone  20 mg Per J Tube QAM     vancomycin (VANCOCIN) IV  1,500 mg Intravenous Q18H     aspirin  81 mg Per J Tube Daily     Brivaracetam  100 mg Oral BID     fiber modular  1 packet Per Feeding Tube TID     levothyroxine  150 mcg Per J Tube QAM AC     melatonin  6 mg Per J Tube QPM     multivitamins with minerals  15 mL Per J Tube Daily     potassium & sodium phosphates  1 packet Oral BID     cholecalciferol  2,000 Units Per G Tube Daily     enoxaparin  40 mg Subcutaneous Q24H     pantoprazole  40 mg Per NG tube Daily     calcium carbonate  1,250 mg Oral TID w/meals     ciprofloxacin  400 mg Intravenous Q12H     meropenem  500 mg Intravenous Q6H     sodium chloride (PF)  3 mL Intracatheter Q8H       Data      Recent Labs  Lab 10/06/17  0825 10/05/17  0815 10/04/17  0610 10/03/17  2151   WBC 6.2 13.6* 17.6* 17.0*   HGB 12.3* 12.9* 14.6 16.9   MCV 86 85 83 85   * 111* 122* 175    146* 135 132*   POTASSIUM 3.8 4.2 4.2 5.2   CHLORIDE 110* 113* 101 95   CO2 27 26 25 23   BUN 15 14 14 16   CR 0.85 0.84 0.98 0.95   ANIONGAP 6 7 9 14   STEVE 8.3* 8.3* 8.7 9.7   * 140* 98 70   ALBUMIN  --   --   --  3.8   PROTTOTAL  --   --   --  9.2*   BILITOTAL  --   --   --  0.4   ALKPHOS  --   --   --  91   ALT  --   --   --  36   AST  --   --   --  43   LIPASE  --   --   --  299       No results found for this or any previous visit (from the past 24 hour(s)).

## 2017-10-07 VITALS
DIASTOLIC BLOOD PRESSURE: 58 MMHG | WEIGHT: 170.42 LBS | SYSTOLIC BLOOD PRESSURE: 133 MMHG | RESPIRATION RATE: 18 BRPM | OXYGEN SATURATION: 96 % | HEART RATE: 61 BPM | TEMPERATURE: 98.5 F | BODY MASS INDEX: 23.77 KG/M2

## 2017-10-07 LAB
ANION GAP SERPL CALCULATED.3IONS-SCNC: 5 MMOL/L (ref 3–14)
BUN SERPL-MCNC: 16 MG/DL (ref 7–30)
CALCIUM SERPL-MCNC: 8.1 MG/DL (ref 8.5–10.1)
CARBAMAZEPINE SERPL-MCNC: 8.6 MG/L (ref 4–12)
CHLORIDE SERPL-SCNC: 108 MMOL/L (ref 94–109)
CO2 SERPL-SCNC: 28 MMOL/L (ref 20–32)
CREAT SERPL-MCNC: 0.82 MG/DL (ref 0.66–1.25)
ERYTHROCYTE [DISTWIDTH] IN BLOOD BY AUTOMATED COUNT: 17.8 % (ref 10–15)
GFR SERPL CREATININE-BSD FRML MDRD: >90 ML/MIN/1.7M2
GLUCOSE SERPL-MCNC: 156 MG/DL (ref 70–99)
HCT VFR BLD AUTO: 37 % (ref 40–53)
HGB BLD-MCNC: 12.4 G/DL (ref 13.3–17.7)
MAGNESIUM SERPL-MCNC: 1.9 MG/DL (ref 1.6–2.3)
MCH RBC QN AUTO: 28.3 PG (ref 26.5–33)
MCHC RBC AUTO-ENTMCNC: 33.5 G/DL (ref 31.5–36.5)
MCV RBC AUTO: 85 FL (ref 78–100)
PHOSPHATE SERPL-MCNC: 2.5 MG/DL (ref 2.5–4.5)
PLATELET # BLD AUTO: 129 10E9/L (ref 150–450)
POTASSIUM SERPL-SCNC: 3.9 MMOL/L (ref 3.4–5.3)
RBC # BLD AUTO: 4.38 10E12/L (ref 4.4–5.9)
SODIUM SERPL-SCNC: 141 MMOL/L (ref 133–144)
WBC # BLD AUTO: 5.4 10E9/L (ref 4–11)

## 2017-10-07 PROCEDURE — 25000132 ZZH RX MED GY IP 250 OP 250 PS 637: Mod: GY | Performed by: HOSPITALIST

## 2017-10-07 PROCEDURE — 25000128 H RX IP 250 OP 636: Performed by: HOSPITALIST

## 2017-10-07 PROCEDURE — 80048 BASIC METABOLIC PNL TOTAL CA: CPT | Performed by: INTERNAL MEDICINE

## 2017-10-07 PROCEDURE — 36415 COLL VENOUS BLD VENIPUNCTURE: CPT | Performed by: INTERNAL MEDICINE

## 2017-10-07 PROCEDURE — 80156 ASSAY CARBAMAZEPINE TOTAL: CPT | Performed by: INTERNAL MEDICINE

## 2017-10-07 PROCEDURE — 85027 COMPLETE CBC AUTOMATED: CPT | Performed by: INTERNAL MEDICINE

## 2017-10-07 PROCEDURE — 83735 ASSAY OF MAGNESIUM: CPT | Performed by: INTERNAL MEDICINE

## 2017-10-07 PROCEDURE — A9270 NON-COVERED ITEM OR SERVICE: HCPCS | Mod: GY | Performed by: HOSPITALIST

## 2017-10-07 PROCEDURE — A9270 NON-COVERED ITEM OR SERVICE: HCPCS | Mod: GY | Performed by: INTERNAL MEDICINE

## 2017-10-07 PROCEDURE — 84100 ASSAY OF PHOSPHORUS: CPT | Performed by: INTERNAL MEDICINE

## 2017-10-07 PROCEDURE — 25000128 H RX IP 250 OP 636: Performed by: INTERNAL MEDICINE

## 2017-10-07 PROCEDURE — 25000132 ZZH RX MED GY IP 250 OP 250 PS 637: Mod: GY | Performed by: INTERNAL MEDICINE

## 2017-10-07 PROCEDURE — 25000125 ZZHC RX 250: Performed by: HOSPITALIST

## 2017-10-07 PROCEDURE — 99239 HOSP IP/OBS DSCHRG MGMT >30: CPT | Performed by: INTERNAL MEDICINE

## 2017-10-07 RX ORDER — CARBAMAZEPINE 100 MG/5ML
150 SUSPENSION ORAL EVERY 8 HOURS SCHEDULED
Status: DISCONTINUED | OUTPATIENT
Start: 2017-10-07 | End: 2017-10-07 | Stop reason: HOSPADM

## 2017-10-07 RX ORDER — MINOCYCLINE HYDROCHLORIDE 100 MG/1
100 CAPSULE ORAL EVERY 12 HOURS
Qty: 14 CAPSULE | Refills: 0 | Status: SHIPPED | OUTPATIENT
Start: 2017-10-07 | End: 2017-10-14

## 2017-10-07 RX ORDER — CIPROFLOXACIN 500 MG/1
500 TABLET, FILM COATED ORAL EVERY 12 HOURS
Qty: 14 TABLET | Refills: 0 | Status: ON HOLD | OUTPATIENT
Start: 2017-10-07 | End: 2017-10-19

## 2017-10-07 RX ORDER — CIPROFLOXACIN 250 MG/1
500 TABLET, FILM COATED ORAL EVERY 12 HOURS SCHEDULED
Status: DISCONTINUED | OUTPATIENT
Start: 2017-10-07 | End: 2017-10-07 | Stop reason: HOSPADM

## 2017-10-07 RX ORDER — MINOCYCLINE HYDROCHLORIDE 100 MG/1
100 CAPSULE ORAL EVERY 12 HOURS SCHEDULED
Status: DISCONTINUED | OUTPATIENT
Start: 2017-10-07 | End: 2017-10-07 | Stop reason: HOSPADM

## 2017-10-07 RX ORDER — CALCIUM CARBONATE 1250 MG/5ML
1250 SUSPENSION ORAL
Status: DISCONTINUED | OUTPATIENT
Start: 2017-10-07 | End: 2017-10-07 | Stop reason: HOSPADM

## 2017-10-07 RX ORDER — CARBAMAZEPINE 100 MG/5ML
150 SUSPENSION ORAL EVERY 8 HOURS
Qty: 900 ML | Refills: 0
Start: 2017-10-07 | End: 2017-12-26

## 2017-10-07 RX ADMIN — MEROPENEM 500 MG: 500 INJECTION, POWDER, FOR SOLUTION INTRAVENOUS at 03:42

## 2017-10-07 RX ADMIN — ENOXAPARIN SODIUM 40 MG: 40 INJECTION SUBCUTANEOUS at 10:01

## 2017-10-07 RX ADMIN — Medication 40 MG: at 10:00

## 2017-10-07 RX ADMIN — Medication 1 PACKET: at 10:08

## 2017-10-07 RX ADMIN — CIPROFLOXACIN HYDROCHLORIDE 500 MG: 250 TABLET, FILM COATED ORAL at 10:00

## 2017-10-07 RX ADMIN — CARBAMAZEPINE 150 MG: 100 SUSPENSION ORAL at 10:00

## 2017-10-07 RX ADMIN — POTASSIUM CHLORIDE 20 MEQ: 1.5 POWDER, FOR SOLUTION ORAL at 10:01

## 2017-10-07 RX ADMIN — Medication 15 ML: at 10:00

## 2017-10-07 RX ADMIN — LEVOTHYROXINE SODIUM 150 MCG: 150 TABLET ORAL at 06:45

## 2017-10-07 RX ADMIN — MINOCYCLINE HYDROCHLORIDE 100 MG: 100 CAPSULE ORAL at 10:01

## 2017-10-07 RX ADMIN — CALCIUM CARBONATE 1250 MG: 1250 SUSPENSION ORAL at 10:00

## 2017-10-07 RX ADMIN — VITAMIN D, TAB 1000IU (100/BT) 2000 UNITS: 25 TAB at 10:01

## 2017-10-07 RX ADMIN — CARBAMAZEPINE 150 MG: 100 SUSPENSION ORAL at 14:03

## 2017-10-07 RX ADMIN — BRIVARACETAM 100 MG: 50 TABLET, FILM COATED ORAL at 10:00

## 2017-10-07 RX ADMIN — Medication 2 G: at 11:09

## 2017-10-07 RX ADMIN — POTASSIUM & SODIUM PHOSPHATES POWDER PACK 280-160-250 MG 1 PACKET: 280-160-250 PACK at 10:01

## 2017-10-07 RX ADMIN — HYDROCORTISONE 20 MG: 20 TABLET ORAL at 10:01

## 2017-10-07 RX ADMIN — ASPIRIN 81 MG 81 MG: 81 TABLET ORAL at 10:01

## 2017-10-07 RX ADMIN — CALCIUM CARBONATE 1250 MG: 1250 SUSPENSION ORAL at 14:03

## 2017-10-07 NOTE — PROGRESS NOTES
[facility]  Infectious Disease Progress Note          Assessment and Plan:   IMPRESSION:   1.  A 57-year-old male with traumatic brain injury and spastic hemiplegia, admitted with acute fever, hypoxia and found to have infiltrates, consistent with pneumonia, very likely aspiration pneumonia.   2.  Prior history of dysphagia and aspiration pneumonia, previous pneumonias with pseudomonas lung colonization but last was late in 2016.   3.  Necrotizing pancreatitis earlier this year with resistant organisms including a highly resistant pseudomonas and vancomycin-resistant enterococcus, no symptoms of that currently.   4.  Traumatic brain injury with significant aphagia, has a G-tube in place.   5.  Seizure disorder.  6 MRSA colonized incl now       RECOMMENDATIONS:  1 Cultures neg, rapidly better, same tx while here  But OK with me disposition enteral cipro 500 bid and minocin 100 bid for 7 days        Interval History:   no new complaints and doing well; no + cxs, MRSA + nares, fever resolved off O2, WBC 6 K              Medications:       ciprofloxacin  500 mg Per Feeding Tube Q12H ALONDRA     minocycline  100 mg Oral Q12H ALONDRA     calcium carbonate  1,250 mg Oral TID w/meals     carBAMazepine  150 mg Per Feeding Tube Q8H ALONDRA     hydrocortisone  10 mg Oral or Feeding Tube QPM     hydrocortisone  20 mg Per J Tube QAM     aspirin  81 mg Per J Tube Daily     Brivaracetam  100 mg Oral BID     fiber modular  1 packet Per Feeding Tube TID     levothyroxine  150 mcg Per J Tube QAM AC     melatonin  6 mg Per J Tube QPM     multivitamins with minerals  15 mL Per J Tube Daily     potassium & sodium phosphates  1 packet Oral BID     cholecalciferol  2,000 Units Per G Tube Daily     enoxaparin  40 mg Subcutaneous Q24H     pantoprazole  40 mg Per NG tube Daily     sodium chloride (PF)  3 mL Intracatheter Q8H                  Physical Exam:   Blood pressure 133/58, pulse 61, temperature 98.5  F (36.9  C),  temperature source Oral, resp. rate 18, weight 77.3 kg (170 lb 6.7 oz), SpO2 96 %.  Wt Readings from Last 2 Encounters:   10/07/17 77.3 kg (170 lb 6.7 oz)   07/27/17 73.9 kg (163 lb)     Vital Signs with Ranges  Temp:  [97.4  F (36.3  C)-98.5  F (36.9  C)] 98.5  F (36.9  C)  Pulse:  [55-61] 61  Heart Rate:  [74-80] 74  Resp:  [18-20] 18  BP: (110-133)/(58-66) 133/58  SpO2:  [95 %-98 %] 96 %    Constitutional: Awake, alert, baseline neuro   Lungs: Clear to auscultation bilaterally, no crackles or wheezing   Cardiovascular: Regular rate and rhythm, normal S1 and S2, and no murmur noted   Abdomen: Normal bowel sounds, soft, non-distended, non-tender   Skin: No rashes, no cyanosis, no edema   Other:           Data:   All microbiology laboratory data reviewed.  Recent Labs   Lab Test  10/07/17   0752  10/06/17   0825  10/05/17   0815   WBC  5.4  6.2  13.6*   HGB  12.4*  12.3*  12.9*   HCT  37.0*  37.6*  39.3*   MCV  85  86  85   PLT  129*  120*  111*     Recent Labs   Lab Test  10/07/17   0752  10/06/17   0825  10/05/17   0815   CR  0.82  0.85  0.84     No lab results found.  Recent Labs   Lab Test  10/03/17   2205  10/03/17   2155  01/30/17   1453  01/23/17   1720  01/22/17   1430  01/22/17   0038  01/21/17   1608  01/21/17   1600  11/29/16   0836   CULT  No growth after 3 days  No growth after 3 days  Moderate growth Pseudomonas aeruginosa  Light growth Enterobacter cloacae complex  Moderate growth Candida albicans / dubliniensis Candida albicans and Candida   dubliniensis are not routinely speciated Susceptibility testing not routinely   done  Moderate growth Enterococcus faecium (VRE)  *  No anaerobes isolated  Canceled, Test credited Duplicate request PCR WAS ALREADY ORDERED  No anaerobes isolated  Heavy growth Enterobacter cloacae complex  Heavy growth Enterococcus faecium (VRE)  Critical Value/Significant Value called to and read back by Phylicia Lamar RN, @   1188 01.24.2017 BL  *  No growth  No growth  No  growth  Light growth Normal jaime  Heavy growth Pseudomonas aeruginosa  *

## 2017-10-07 NOTE — PLAN OF CARE
Problem: Patient Care Overview  Goal: Plan of Care/Patient Progress Review  Outcome: No Change  Pt non-verbal. Alert. Right sided weakness. VSS on RA. Afebrile. Denies pain. Denies SBO. L/s diminshed. TF running at 90 cc/hr, 150 ml free water flushes q 4hrs. NPO. +BS. Had BM, smear x1. Lerma cath intact, good UOP. Foam dressing to coccyx, CDI. Up with ceiling lift. Fall risk. Turned and repo q 2hrs. Receiving iv abx. Exp d/c to home with home PCA today. Nursing continue to monitor.

## 2017-10-07 NOTE — PLAN OF CARE
Problem: Patient Care Overview  Goal: Plan of Care/Patient Progress Review  Outcome: Improving  Pt non-verbal, calm/cooperative. Right sided hemiplegia.Lift. Fall risk-total care, turned and repositioned q 2hrs. Afebrile, no pain. Lerma is intact with good output. Receiving IV abx. TF runs 22-14, NPO. Coccyx wound, dressing on. Heals are suspended. tegretol on hold, lab level was elevated. D/c pending home with RN/PCA tomorrow.

## 2017-10-07 NOTE — PROVIDER NOTIFICATION
MD Notification    Notified Person:  MD    Notified Persons Name: Dr. Stoddard    Notification Date/Time: 11:35 AM     Notification Interaction:  Talked with Physician    Purpose of Notification: Regarding discharge. Pt. Mother has question regarding medication for MD, please call her #[phone]    Orders Received:    Comments:

## 2017-10-07 NOTE — PROVIDER NOTIFICATION
MD Notification    Notified Person:  MD    Notified Persons Name: Dr. Stoddard     Notification Date/Time: 9 AM 10/07/2017    Notification Interaction:  Talked with Physician    Purpose of Notification: Carbamazepine 8.6, replacing electrolytes. high protocol. K 3.9 Mg 1.9 and Phosphorus 2.5    Orders Received: Pending    Comments:

## 2017-10-07 NOTE — PROGRESS NOTES
CHE GRAHAM) MD has discharged patient home, with orders to resume home care. Patient was getting RN vists and PCA services previously. The Home Care agency is [facility], p. [phone] or [phone] and [phone].  I) Spoke with patient's mother Savannah, who provided the PCA's name and phone, as he needs to be present when patient arrives. Reached PCA Keyon Dover at [phone]. He will be present when patient arrives home this afternoon[facility] BLS is needed due to patient's TBI and Seizure Disorder which require monitoring, and his right spastic hemiplegia which limits his ability to sit in a standard wheelchair. The PCS form was completed and faxed.  Called [facility] and spoke with Raissa. Faxed the orders and other information as requested.  A) Patient's mother agrees to this plan.  P) D/C home today with continued HHC and PCA coverage.

## 2017-10-07 NOTE — PLAN OF CARE
Problem: Patient Care Overview  Goal: Plan of Care/Patient Progress Review  Outcome: Adequate for Discharge Date Met:  10/07/17  Pt. Transferred home via [facility]. Medications being  to his home. Spoke to Mother and UZIEL Ta via phone regarding prescription medication sent home.

## 2017-10-07 NOTE — DISCHARGE SUMMARY
[facility]    Discharge Summary  Hospitalist    Date of Admission:  10/3/2017  Date of Discharge:  10/7/2017  Discharging Provider: Migel Stoddard MD    Discharge Diagnoses         Severe sepsis due to right middle and lower lobe pneumonia  Acute hypoxic and hypercapnic respiratory failure  TBI with aphasia  Dysphagia s/p G-tube,   Right spastic hemiplegia  Seizure disorder/supratherapeutic carbamazepine level   Panhypopituitarism  Mild thrombocytopenia    Hospital Course   Keyon Farias is a 57 year old male with past history TBI with right spastic hemiplegia, aphasia, dysphagia s/p G-tube, seizure disorder, panhypopit and V-tach/V-fib who presented with sepsis due to right middle and lower lobe pneumonia.      Severe sepsis due to right middle and lower lobe pneumonia  Acute hypoxic and hypercapnic respiratory failure  -Presented with fever (102), tachycardia (150's), tachypnea (40's), leukocytosis (17 with left shift) with   --ABG demonstrating hypoxia and hypercapnea with CT chest showing right middle and lower lobe infiltrates.    -Lactate elevated at 10.  Given amikacin, linezolid and ciprofloxacin in ED given history of drug-resistant organisms (including VRE and Psuedomonas resistant to Zosyn and meropenem though these were isolated from pancreatic fluid).  -suspect this was most likely a typical aspiration pneumonia given dysphagia and mother reporting she gives him 34 bites daily  -Cultures negative  -Infectious disease consulted;  initially maintained on IV meropenem, IV Cipro and vancomycin later switched to oral Cipro and minocycline for 1 more week at discharge  -Off oxygen      TBI with aphasia, dysphagia s/p G-tube, right spastic hemiplegia  Able to respond to simple questions.  Nonverbal at baseline  -Continue tube feedings       Seizure disorder/supratherapeutic carbamazepine level  Thought due to TBI.  -Carbamazepine level at presentation was more than 12, increased up to 16,  likely due to concomitant Cipro use  -Held for a day and started at a lower frequency of q. 8 hour versus prior to admission q.6 hours  -Given the concomitant need for Cipro, advised to continue carbamazepine at 150 MG every eight hours  -re- check carbamazepine level with PCP on Tuesday i.e. in three days  -After ciprofloxacin dosing is complete, might need to go back on previous dose of q. six hours.  Explained at length to the mother who voiced understanding of my plan.  -Continue prior to admission Briviact     Panhypopituitarism  Also felt due to TBI.  -Initially placed on stress dose steroids, and transitioned to prior to admission regimen before DC  -continue  PTA hydrocortisone and PTA levothyroxine     Mild thrombocytopenia:  -Likely due to the septic process, platelets stable at 120      Migel Stoddard MD    Significant Results and Procedures   See below    Pending Results     Unresulted Labs Ordered in the Past 30 Days of this Admission     Date and Time Order Name Status Description    10/3/2017 2217 Blood culture Preliminary     10/3/2017 2150 Blood culture ONE site Preliminary           Code Status   Full Code       Primary Care Physician   [facility]    Physical Exam   Temp: 98.5  F (36.9  C) Temp src: Oral BP: 133/58 Pulse: 61 Heart Rate: 74 Resp: 18 SpO2: 96 % O2 Device: None (Room air)      Constitutional: AA; follow one or two simple questions appropriately  Respiratory: CTA B/L, Normal WOB, No crackles or wheezes  Cardiovascular: RRR, No murmur  GI: Soft, Non- tender, PEG tube in place  Skin/Integument: Warm and dry, no rashes  MSK:  spastic hemiplegia     Discharge Disposition   Discharged to home  Condition at discharge: Stable    Consultations This Hospital Stay   PHARMACY IP CONSULT  PHYSICAL THERAPY ADULT IP CONSULT  OCCUPATIONAL THERAPY ADULT IP CONSULT  NUTRITION SERVICES ADULT IP CONSULT  SWALLOW EVAL SPEECH PATH AT BEDSIDE IP CONSULT  INFECTIOUS DISEASES IP CONSULT  PHARMACY  TO DOSE [facility]  NUTRITION SERVICES ADULT IP CONSULT  PHARMACY IP CONSULT  WOUND OSTOMY CONTINENCE NURSE  IP CONSULT    Time Spent on this Encounter   I, Migel Stoddard, personally saw the patient today and spent greater than 30 minutes discharging this patient.    Discharge Orders     Reason for your hospital stay   Rt sided PNA     Follow-up and recommended labs and tests   [facility]- up.  The following labs/tests are recommended: Carbamazepine level on 10/10/17.     Activity   Your activity upon discharge: activity as tolerated     Full Code     Diet   Follow this diet upon discharge: Orders Placed This Encounter     Adult Formula Drip Feeding: Specified Time Isosource 1.5; Jejunostomy; Goal Rate: 90 mL/hr x 16 hrs (hold TF 1 hr before and 1 hr after Synthroid administration); mL/hr; From: 10:00 PM; 2:00 PM; Medication - Tube Feeding Flush Frequency: At least ...     NPO for Medical/Clinical Reasons Except for: No Exceptions         Discharge Medications   Current Discharge Medication List      START taking these medications    Details   ciprofloxacin (CIPRO) 500 MG tablet 1 tablet (500 mg) by Per Feeding Tube route every 12 hours  Qty: 14 tablet, Refills: 0    Associated Diagnoses: Pneumonia of right lower lobe due to infectious organism (H)      minocycline (MINOCIN/DYNACIN) 100 MG capsule Take 1 capsule (100 mg) by mouth every 12 hours for 7 days  Qty: 14 capsule, Refills: 0    Associated Diagnoses: Pneumonia of right lower lobe due to infectious organism (H)         CONTINUE these medications which have CHANGED    Details   carBAMazepine (TEGRETOL) 100 MG/5ML suspension Take 7.5 mLs (150 mg) by mouth every 8 hours  Qty: 900 mL, Refills: 0    Associated Diagnoses: Pneumonia of right lower lobe due to infectious organism (H)         CONTINUE these medications which have NOT CHANGED    Details   Brivaracetam (BRIVIACT) 10 MG/ML  solution Take 100 mg by mouth 2 times daily      pantoprazole (PROTONIX) SUSP suspension Take 20 mg by mouth 2 times daily      potassium & sodium phosphates (NEUTRA-PHOS) 280-160-250 MG Packet Take 1 packet by mouth 2 times daily 0900, 1500, 2100.       VITAMIN D, CHOLECALCIFEROL, PO Take 2,000 Units by mouth daily      bacitracin ointment Apply topically daily as needed for wound care To PEG site.      melatonin 1 MG/ML LIQD liquid 6 mLs (6 mg) by Per J Tube route every evening    Associated Diagnoses: Insomnia, unspecified type      aspirin 10 mg/mL 8.1 mLs (81 mg) by Per J Tube route daily    Associated Diagnoses: Routine adult health maintenance      multivitamins with minerals (CERTAVITE/CEROVITE) LIQD liquid 15 mLs by Per J Tube route daily    Associated Diagnoses: Necrotizing pancreatitis; Malnutrition (H)      fiber modular (NUTRISOURCE FIBER) packet 1 packet by Per Feeding Tube route 3 times daily    Associated Diagnoses: Necrotizing pancreatitis      calcium carbonate 1250 (500 CA) MG/5ML SUSP suspension 5 mLs (1,250 mg) by Per J Tube route 3 times daily (with meals)  Qty: 450 mL    Associated Diagnoses: Malnutrition (H)      levothyroxine (SYNTHROID) 25 mcg/mL 6 mLs (150 mcg) by Per J Tube route every morning (before breakfast)    Associated Diagnoses: Panhypopituitarism (H)      !! hydrocortisone (CORTEF) 2 mg/mL 10 mLs (20 mg) by Per J Tube route every morning    Associated Diagnoses: Panhypopituitarism (H)      !! hydrocortisone (CORTEF) 2 mg/mL Take 5 mLs (10 mg) by mouth every evening    Associated Diagnoses: Panhypopituitarism (H)      bisacodyl (DULCOLAX) 10 MG Suppository Place 1 suppository (10 mg) rectally daily as needed for constipation  Qty: 30 suppository    Associated Diagnoses: Constipation, unspecified constipation type      miconazole (MICATIN; MICRO GUARD) 2 % powder Apply topically every hour as needed for other (topical candidiasis)    Associated Diagnoses: Rash      polyethylene  glycol (MIRALAX/GLYCOLAX) Packet Take 17 g by mouth 2 times daily as needed (constipation)  Qty: 7 packet    Associated Diagnoses: Constipation, unspecified constipation type      ACETAMINOPHEN  mg by Per Feeding Tube route every 4 hours as needed for pain      testosterone cypionate (DEPOTESTOTERONE CYPIONATE) 200 MG/ML injection Inject 100 mg into the muscle once a week Fridays       !! - Potential duplicate medications found. Please discuss with provider.        Allergies   Allergies   Allergen Reactions     Dilantin [Phenytoin Sodium]      Valproic Acid      Toxicity c bone marrow suspension, elevated ammonia levels      Data   Most Recent 3 CBC's:  Recent Labs   Lab Test  10/07/17   0752  10/06/17   0825  10/05/17   0815   WBC  5.4  6.2  13.6*   HGB  12.4*  12.3*  12.9*   MCV  85  86  85   PLT  129*  120*  111*      Most Recent 3 BMP's:  Recent Labs   Lab Test  10/07/17   0752  10/06/17   0825  10/05/17   0815   NA  141  143  146*   POTASSIUM  3.9  3.8  4.2   CHLORIDE  108  110*  113*   CO2  28  27  26   BUN  16  15  14   CR  0.82  0.85  0.84   ANIONGAP  5  6  7   STEVE  8.1*  8.3*  8.3*   GLC  156*  158*  140*     Most Recent 2 LFT's:  Recent Labs   Lab Test  10/03/17   2151  02/08/17   0642   AST  43  17   ALT  36  25   ALKPHOS  91  59   BILITOTAL  0.4  0.6     Most Recent INR's and Anticoagulation Dosing History:  Anticoagulation Dose History     Recent Dosing and Labs Latest Ref Rng & Units 12/1/2016 1/22/2017 1/22/2017 1/23/2017 1/25/2017 1/30/2017 2/7/2017    INR 0.86 - 1.14 1.30(H) 2.48(H) 2.58(H) 1.53(H) 1.49(H) 1.32(H) 1.27(H)        Most Recent 3 Troponin's:  Recent Labs   Lab Test  01/22/17   0500  01/21/17   2348  01/21/17   1600   TROPI  0.017  0.025  0.028     Most Recent Cholesterol Panel:  Recent Labs   Lab Test  11/29/16   0430   TRIG  100     Most Recent 6 Bacteria Isolates From Any Culture (See EPIC Reports for Culture Details):  Recent Labs   Lab Test  10/03/17   2202  10/03/17   6451   01/30/17   1453  01/23/17   1720  01/22/17   1430  01/22/17   0038   CULT  No growth after 3 days  No growth after 3 days  Moderate growth Pseudomonas aeruginosa  Light growth Enterobacter cloacae complex  Moderate growth Candida albicans / dubliniensis Candida albicans and Candida   dubliniensis are not routinely speciated Susceptibility testing not routinely   done  Moderate growth Enterococcus faecium (VRE)  *  No anaerobes isolated  Canceled, Test credited Duplicate request PCR WAS ALREADY ORDERED  No anaerobes isolated  Heavy growth Enterobacter cloacae complex  Heavy growth Enterococcus faecium (VRE)  Critical Value/Significant Value called to and read back by Phylicia Lamar RN, @   2326 01.24.2017 BL  *  No growth     Most Recent TSH, T4 and A1c Labs:  Recent Labs   Lab Test  01/29/17   0403  12/01/16   0400   11/24/16   0405   TSH   --   <0.01*   --    --    T4  0.54*  0.73*   < >   --    A1C   --    --    --   5.1    < > = values in this interval not displayed.       Results for orders placed or performed during the hospital encounter of 10/03/17   Chest  XR, 1 view PORTABLE    Narrative    XR CHEST PORT 1 VW   10/3/2017 11:15 PM     INDICATION: Dyspnea.    COMPARISON: 6/4/2017.      Impression    IMPRESSION: Shallow inspiration. Mild infiltrate at the right base  better seen on the CT. There is also a small area of infiltrate or  atelectasis at the left base medially. Opaque tubing projecting over  the right colon.    PANFILO KING MD   CT Chest/Abdomen/Pelvis w Contrast    Narrative    CT CHEST/ABDOMEN/PELVIS W CONTRAST  10/3/2017 11:13 PM     HISTORY: Severe sepsis, history of necrotizing pancreatitis, possible  respiratory infection.    TECHNIQUE: Volumetric acquisition through chest, abdomen and pelvis  with IV contrast. 82 mL Isovue-370. Radiation dose for this scan was  reduced using automated exposure control, adjustment of the mA and/or  kV according to patient size, or iterative  reconstruction technique.    COMPARISON: None.    FINDINGS:   Chest: Moderate hazy infiltrate in the right mid and lower lung likely  due to pneumonia. Mild infiltrate or atelectasis at the left lung base  medially. No pleural effusions. Borderline prominent right  paratracheal and subcarinal lymph nodes. Heart size is within normal  limits.    Abdomen and pelvis: Liver, spleen, gallbladder, adrenal glands and  kidneys demonstrate no worrisome findings. Well-circumscribed 2.5 cm  rounded fluid pocket/cystic mass abutting the undersurface of the  pancreatic tail which is likely a pseudocyst. This is new since the  previous study and is at the site where there was previously a drain  in place communicating with the stomach. No significant peripancreatic  inflammatory change to suggest acute pancreatitis. No other fluid  collections. Percutaneous gastrostomy tube.    Lerma catheter in the bladder. Coiled opaque tube in the ascending  colon which should represent migration of the patient's  cystogastrostomy tube.      Impression    IMPRESSION:  1. Fairly extensive right mid and lower lung infiltrate likely  pneumonia. Small area of infiltrate or atelectasis left base medially.  2. Small cystic lesion abutting the pancreatic tail likely a  pseudocyst at the site of a previous drainage catheter. No  peripancreatic inflammatory change or other acute findings.  3. Opaque tubing in the right hemicolon which should represent  migration of the patient's cystogastrostomy tube.    Findings discussed with ER physician at 11:45 PM    PANFILO KING MD

## 2017-10-10 LAB
BACTERIA SPEC CULT: NO GROWTH
BACTERIA SPEC CULT: NO GROWTH
Lab: NORMAL
Lab: NORMAL
SPECIMEN SOURCE: NORMAL
SPECIMEN SOURCE: NORMAL

## 2017-10-12 ENCOUNTER — CARE COORDINATION (OUTPATIENT)
Dept: CARE COORDINATION | Facility: CLINIC | Age: 57
End: 2017-10-12

## 2017-10-12 NOTE — PROGRESS NOTES
Clinic Care Coordination Contact  [place] [facility]/Michelle Care Coordination Outreach    Patient was enrolled in [facility] upon Discharged from: Hospital    Program Type: Pneumonia    Program Length: 30 days post discharge    Clinical Data:  Outreach Type: Initial post discharge     (Insert screen shot from [facility] if applicable)      Action: Actions: Left Voicemail                   Clinic Care Coordinator RN reviewed chart. Patient has a TBI. Is non verbal. Patient's mother is caregiver.        Plan: RN CC will continue to monitor patients responses. Will plan to outreach as needed and with any new variances or concerns.

## 2017-10-18 ENCOUNTER — HOSPITAL ENCOUNTER (OUTPATIENT)
Facility: CLINIC | Age: 57
End: 2017-10-18
Admitting: RADIOLOGY

## 2017-10-18 DIAGNOSIS — C18.9 MALIGNANT NEOPLASM OF COLON, UNSPECIFIED PART OF COLON (H): Primary | ICD-10-CM

## 2017-10-19 ENCOUNTER — APPOINTMENT (OUTPATIENT)
Dept: INTERVENTIONAL RADIOLOGY/VASCULAR | Facility: CLINIC | Age: 57
End: 2017-10-19
Attending: NURSE PRACTITIONER
Payer: MEDICARE

## 2017-10-19 ENCOUNTER — HOSPITAL ENCOUNTER (OUTPATIENT)
Facility: CLINIC | Age: 57
Discharge: HOME | End: 2017-10-19
Attending: RADIOLOGY | Admitting: RADIOLOGY
Payer: MEDICARE

## 2017-10-19 VITALS
HEART RATE: 69 BPM | DIASTOLIC BLOOD PRESSURE: 84 MMHG | OXYGEN SATURATION: 99 % | TEMPERATURE: 97 F | SYSTOLIC BLOOD PRESSURE: 153 MMHG | RESPIRATION RATE: 20 BRPM

## 2017-10-19 DIAGNOSIS — C18.9 MALIGNANT NEOPLASM OF COLON, UNSPECIFIED PART OF COLON (H): ICD-10-CM

## 2017-10-19 PROCEDURE — 27210815 ZZ H TUBE GASTRO CR13

## 2017-10-19 PROCEDURE — 27210905 ZZH KIT CR7

## 2017-10-19 PROCEDURE — 49452 REPLACE G-J TUBE PERC: CPT

## 2017-10-19 PROCEDURE — 40000854 ZZH STATISTIC SIMPLE TUBE INSERTION/CHARGE, PORT, CATH, FISTULOGRAM

## 2017-10-19 PROCEDURE — C1769 GUIDE WIRE: HCPCS

## 2017-10-19 NOTE — PROCEDURES
RADIOLOGY PROCEDURE NOTE  Patient name: Keyon Farias  MRN: [id number]  : 1960    Pre-procedure diagnosis: Nutrition needs  Post-procedure diagnosis: Same    Procedure Date/Time: 2017  1:36 PM  Procedure: 18 Fr GJ tube exchange.  In good position, ready for use.  Estimated blood loss: < 5 ml  Specimen(s) collected with description: Existing tube  The patient tolerated the procedure well with no immediate complications.  Significant findings:  Please see above.    See imaging dictation for procedural details.    Provider name: Brandon Villafana  Assistant(s):None

## 2017-10-19 NOTE — IR NOTE
Interventional Radiology Intra-procedural Nursing Note     Patient Name:  Keyon Farias    Medical Record Number: [id number]  Today's Date:  October 19, 2017  Start Time: 1323  End of procedure time: 1331  Procedure: GJ tube replacement  Report given to: care suites RN  Time pt departs:  1340       Notes:    Patient brought  into IR room# 1 at 1300.      Informed consent obtained by CHONG Marquez MD.      Neuro assessment completed and found to be WNL, patient at his baseline.        No sedation was used for the procedure.        Debrief was completed by CHONG Marquez MD.       The patient tolerated the procedure well.     Neuro assessment remains unchanged at procedure end.         Wilman Dawson RN

## 2017-10-19 NOTE — PROGRESS NOTES
Patient post GJ tube exchange.  No sedation given. VS WNL and abdominal site CDI with dressing.  Patients caregivers have no questions regarding tube cares or tube maintenance.  DC home

## 2017-10-23 ENCOUNTER — CARE COORDINATION (OUTPATIENT)
Dept: CARE COORDINATION | Facility: CLINIC | Age: 57
End: 2017-10-23

## 2017-10-23 NOTE — PROGRESS NOTES
Clinic Care Coordination Contact  [place] [facility]/Michelle Care Coordination Outreach    Patient was enrolled in [facility] upon Discharged from: Hospital    Program Type: Pneumonia    Program Length: 30 days post discharge    Clinical Data:  Outreach Type: Variance follow up call     (Insert screen shot from [facility] if applicable)      Action: Actions: Left Voicemail                           Plan: RN CC will continue to monitor patients responses. Will plan to outreach as needed and with any new variances or concerns.

## 2017-11-07 ENCOUNTER — CARE COORDINATION (OUTPATIENT)
Dept: CARE COORDINATION | Facility: CLINIC | Age: 57
End: 2017-11-07

## 2017-11-07 NOTE — PROGRESS NOTES
Clinic Care Coordination Contact  [place] [facility]/Michelle Care Coordination Outreach    Patient was enrolled in [facility] upon Discharged from: Hospital    Program Type: Pneumonia    Program Length: 30 days post discharge    Clinical Data:  Outreach Type: Variance follow up call     (Insert screen shot from [facility] if applicable)      Action: Actions: Left Voicemail             Plan: RN CC will continue to monitor patients responses. Will plan to outreach as needed and with any new variances or concerns.

## 2017-11-25 ENCOUNTER — HOSPITAL ENCOUNTER (OUTPATIENT)
Dept: INTERVENTIONAL RADIOLOGY/VASCULAR | Facility: CLINIC | Age: 57
Discharge: HOME | End: 2017-11-25
Attending: RADIOLOGY | Admitting: RADIOLOGY
Payer: MEDICARE

## 2017-11-25 DIAGNOSIS — C18.9 MALIGNANT NEOPLASM OF COLON, UNSPECIFIED PART OF COLON (H): ICD-10-CM

## 2017-11-25 PROCEDURE — C1769 GUIDE WIRE: HCPCS

## 2017-11-25 PROCEDURE — 27210815 ZZ H TUBE GASTRO CR13

## 2017-11-25 PROCEDURE — 49452 REPLACE G-J TUBE PERC: CPT

## 2017-11-25 PROCEDURE — 27210905 ZZH KIT CR7

## 2017-11-25 NOTE — PROCEDURES
RADIOLOGY PROCEDURE NOTE  Patient name: Keyon Farias  MRN: [id number]  : 1960    Pre-procedure diagnosis: GJ broken  Post-procedure diagnosis: Same    Procedure Date/Time: 2017  10:16 AM  Procedure: GJ replacement  Estimated blood loss: None  Specimen(s) collected with description: none  The patient tolerated the procedure well with no immediate complications.  Significant findings:none    See imaging dictation for procedural details.    Provider name: Yoel Stevenson  Assistant(s):None

## 2017-12-11 ENCOUNTER — APPOINTMENT (OUTPATIENT)
Dept: GENERAL RADIOLOGY | Facility: CLINIC | Age: 57
End: 2017-12-11
Attending: EMERGENCY MEDICINE
Payer: MEDICARE

## 2017-12-11 ENCOUNTER — HOSPITAL ENCOUNTER (EMERGENCY)
Facility: CLINIC | Age: 57
Discharge: HOME | End: 2017-12-11
Attending: EMERGENCY MEDICINE | Admitting: EMERGENCY MEDICINE
Payer: MEDICARE

## 2017-12-11 VITALS
OXYGEN SATURATION: 97 % | DIASTOLIC BLOOD PRESSURE: 75 MMHG | SYSTOLIC BLOOD PRESSURE: 114 MMHG | RESPIRATION RATE: 18 BRPM | HEART RATE: 74 BPM | TEMPERATURE: 98.5 F

## 2017-12-11 DIAGNOSIS — J69.0 ASPIRATION PNEUMONITIS (H): ICD-10-CM

## 2017-12-11 LAB
ALBUMIN SERPL-MCNC: 3.5 G/DL (ref 3.4–5)
ALP SERPL-CCNC: 90 U/L (ref 40–150)
ALT SERPL W P-5'-P-CCNC: 31 U/L (ref 0–70)
ANION GAP SERPL CALCULATED.3IONS-SCNC: 7 MMOL/L (ref 3–14)
AST SERPL W P-5'-P-CCNC: 24 U/L (ref 0–45)
BASOPHILS # BLD AUTO: 0.1 10E9/L (ref 0–0.2)
BASOPHILS NFR BLD AUTO: 0.8 %
BILIRUB SERPL-MCNC: 0.4 MG/DL (ref 0.2–1.3)
BUN SERPL-MCNC: 16 MG/DL (ref 7–30)
CALCIUM SERPL-MCNC: 9.1 MG/DL (ref 8.5–10.1)
CHLORIDE SERPL-SCNC: 101 MMOL/L (ref 94–109)
CO2 SERPL-SCNC: 30 MMOL/L (ref 20–32)
CREAT SERPL-MCNC: 0.86 MG/DL (ref 0.66–1.25)
DIFFERENTIAL METHOD BLD: ABNORMAL
EOSINOPHIL # BLD AUTO: 0.8 10E9/L (ref 0–0.7)
EOSINOPHIL NFR BLD AUTO: 9.4 %
ERYTHROCYTE [DISTWIDTH] IN BLOOD BY AUTOMATED COUNT: 15.2 % (ref 10–15)
GFR SERPL CREATININE-BSD FRML MDRD: >90 ML/MIN/1.7M2
GLUCOSE SERPL-MCNC: 81 MG/DL (ref 70–99)
HCT VFR BLD AUTO: 45.7 % (ref 40–53)
HGB BLD-MCNC: 15.6 G/DL (ref 13.3–17.7)
IMM GRANULOCYTES # BLD: 0 10E9/L (ref 0–0.4)
IMM GRANULOCYTES NFR BLD: 0.2 %
LACTATE BLD-SCNC: 1.5 MMOL/L (ref 0.7–2)
LYMPHOCYTES # BLD AUTO: 3.3 10E9/L (ref 0.8–5.3)
LYMPHOCYTES NFR BLD AUTO: 39.2 %
MCH RBC QN AUTO: 30.5 PG (ref 26.5–33)
MCHC RBC AUTO-ENTMCNC: 34.1 G/DL (ref 31.5–36.5)
MCV RBC AUTO: 89 FL (ref 78–100)
MONOCYTES # BLD AUTO: 0.9 10E9/L (ref 0–1.3)
MONOCYTES NFR BLD AUTO: 11.2 %
NEUTROPHILS # BLD AUTO: 3.3 10E9/L (ref 1.6–8.3)
NEUTROPHILS NFR BLD AUTO: 39.2 %
NRBC # BLD AUTO: 0 10*3/UL
NRBC BLD AUTO-RTO: 0 /100
PLATELET # BLD AUTO: 140 10E9/L (ref 150–450)
POTASSIUM SERPL-SCNC: 3.9 MMOL/L (ref 3.4–5.3)
PROT SERPL-MCNC: 8.3 G/DL (ref 6.8–8.8)
RBC # BLD AUTO: 5.11 10E12/L (ref 4.4–5.9)
SODIUM SERPL-SCNC: 138 MMOL/L (ref 133–144)
WBC # BLD AUTO: 8.4 10E9/L (ref 4–11)

## 2017-12-11 PROCEDURE — 71020 XR CHEST 2 VW: CPT

## 2017-12-11 PROCEDURE — 83605 ASSAY OF LACTIC ACID: CPT | Performed by: EMERGENCY MEDICINE

## 2017-12-11 PROCEDURE — 99284 EMERGENCY DEPT VISIT MOD MDM: CPT | Mod: 25

## 2017-12-11 PROCEDURE — 85025 COMPLETE CBC W/AUTO DIFF WBC: CPT | Performed by: EMERGENCY MEDICINE

## 2017-12-11 PROCEDURE — 80053 COMPREHEN METABOLIC PANEL: CPT | Performed by: EMERGENCY MEDICINE

## 2017-12-11 ASSESSMENT — ENCOUNTER SYMPTOMS: FEVER: 0

## 2017-12-11 NOTE — ED AVS SNAPSHOT
Emergency Department    [address] [place] [id number]    Phone:  [phone]    Fax:  [phone]                                       Keyon Farias   MRN: [id number]    Department:   Emergency Department   Date of Visit:  12/11/2017           After Visit Summary Signature Page     I have received my discharge instructions, and my questions have been answered. I have discussed any challenges I see with this plan with the nurse or doctor.    ..........................................................................................................................................  Patient/Patient Representative Signature      ..........................................................................................................................................  Patient Representative Print Name and Relationship to Patient    ..................................................               ................................................  Date                                            Time    ..........................................................................................................................................  Reviewed by Signature/Title    ...................................................              ..............................................  Date                                                            Time

## 2017-12-11 NOTE — ED AVS SNAPSHOT
Emergency Department    [address] [facility] [address]    Phone:  [phone]    Fax:  [phone]                                       Keyon Farias   MRN: [id number]    Department:   Emergency Department   Date of Visit:  12/11/2017           Patient Information     Date Of Birth          1960        Your diagnoses for this visit were:     Eval for possible aspiration pneumonitis (H)        You were seen by Fam Farnsworth MD.      Follow-up Information     [facility].    Why:  As needed    Contact information:    [address]  [place] [address]  [phone]          Follow up with  Emergency Department.    Specialty:  EMERGENCY MEDICINE    Why:  If symptoms worsen    Contact information:    [address] [facility] [address] [phone]        Discharge Instructions         Pneumonia (Adult)  Pneumonia is an infection deep within the lungs. It is in the small air sacs (alveoli). Pneumonia may be caused by a virus or bacteria. Pneumonia caused by bacteria is usually treated with an antibiotic. Severe cases may need to be treated in the hospital. Milder cases can be treated at home. Symptoms usually start to get better during the first 2 days of treatment.    Home care  Follow these guidelines when caring for yourself at home:    Rest at home for the first 2 to 3 days, or until you feel stronger. Don t let yourself get overly tired when you go back to your activities.    Stay away from cigarette smoke - yours or other people s.    You may use acetaminophen or ibuprofen to control fever or pain, unless another medicine was prescribed. If you have chronic liver or kidney disease, talk with your healthcare provider before using these medicines. Also talk with your provider if you ve had a stomach ulcer or gastrointestinal bleeding. Don t give aspirin to anyone younger than 18 years of age who is ill with a fever. It may  cause severe liver damage.    Your appetite may be poor, so a light diet is fine.    Drink 6 to 8 glasses of fluids every day to make sure you are getting enough fluids. Beverages can include water, sport drinks, sodas without caffeine, juices, tea, or soup. Fluids will help loosen secretions in the lung. This will make it easier for you to cough up the phlegm (sputum). If you also have heart or kidney disease, check with your healthcare provider before you drink extra fluids.    Take antibiotic medicine prescribed until it is all gone, even if you are feeling better after a few days.  Follow-up care  Follow up with your healthcare provider in the next 2 to 3 days, or as advised. This is to be sure the medicine is helping you get better.  If you are 65 or older, you should get a pneumococcal vaccine and a yearly flu (influenza) shot. You should also get these vaccines if you have chronic lung disease like asthma, emphysema, or COPD. Recently, a second type of pneumonia vaccine has become available for everyone over 65 years old. This is in addition to the previous vaccine. Ask your provider about this.  When to seek medical advice  Call your healthcare provider right away if any of these occur:    You don t get better within the first 48 hours of treatment    Shortness of breath gets worse    Rapid breathing (more than 25 breaths per minute)    Coughing up blood    Chest pain gets worse with breathing    Fever of 100.4 F (38 C) or higher that doesn t get better with fever medicine    Weakness, dizziness, or fainting that gets worse    Thirst or dry mouth that gets worse    Sinus pain, headache, or a stiff neck    Chest pain not caused by coughing  Date Last Reviewed: 1/1/2017 2000-2017 [employer]. [address] [place] [address]. All rights reserved. This information is not intended as a substitute for professional medical care. Always follow your healthcare professional's  instructions.          24 Hour Appointment Hotline       To make an appointment at any [facility], call [phone] ([phone]). If you don't have a family doctor or clinic, we will help you find one. [place] clinics are conveniently located to serve the needs of you and your family.             Review of your medicines      Our records show that you are taking the medicines listed below. If these are incorrect, please call your family doctor or clinic.        Dose / Directions Last dose taken    ACETAMINOPHEN PO   Dose:  650 mg        650 mg by Per Feeding Tube route every 4 hours as needed for pain   Refills:  0        aspirin 10 mg/mL Susp   Dose:  81 mg        8.1 mLs (81 mg) by Per J Tube route daily   Refills:  0        bacitracin ointment        Apply topically daily as needed for wound care To PEG site.   Refills:  0        bisacodyl 10 MG Suppository   Commonly known as:  DULCOLAX   Dose:  10 mg   Quantity:  30 suppository        Place 1 suppository (10 mg) rectally daily as needed for constipation   Refills:  0        BRIVIACT 10 MG/ML solution   Dose:  100 mg   Generic drug:  Brivaracetam        Take 100 mg by mouth 2 times daily   Refills:  0        calcium carbonate 1250 (500 CA) MG/5ML Susp suspension   Dose:  1250 mg   Quantity:  450 mL        5 mLs (1,250 mg) by Per J Tube route 3 times daily (with meals)   Refills:  0        carBAMazepine 100 MG/5ML suspension   Commonly known as:  TEGretol   Dose:  150 mg   Quantity:  900 mL        Take 7.5 mLs (150 mg) by mouth every 8 hours   Refills:  0        fiber modular packet   Dose:  1 packet        1 packet by Per Feeding Tube route 3 times daily   Refills:  0        * hydrocortisone 2 mg/mL Susp   Commonly known as:  CORTEF   Dose:  20 mg        10 mLs (20 mg) by Per J Tube route every morning   Refills:  0        * hydrocortisone 2 mg/mL Susp   Commonly known as:  CORTEF   Dose:  10 mg        Take 5 mLs (10 mg) by mouth every evening    Refills:  0        levothyroxine 25 mcg/mL Susp   Commonly known as:  SYNTHROID   Dose:  150 mcg        6 mLs (150 mcg) by Per J Tube route every morning (before breakfast)   Refills:  0        melatonin 1 MG/ML Liqd liquid   Dose:  6 mg        6 mLs (6 mg) by Per J Tube route every evening   Refills:  0        miconazole 2 % powder   Commonly known as:  MICATIN; MICRO GUARD        Apply topically every hour as needed for other (topical candidiasis)   Refills:  0        multivitamins with minerals Liqd liquid   Dose:  15 mL        15 mLs by Per J Tube route daily   Refills:  0        pantoprazole Susp suspension   Commonly known as:  PROTONIX   Dose:  20 mg        Take 20 mg by mouth 2 times daily   Refills:  0        polyethylene glycol Packet   Commonly known as:  MIRALAX/GLYCOLAX   Dose:  17 g   Quantity:  7 packet        Take 17 g by mouth 2 times daily as needed (constipation)   Refills:  0        potassium & sodium phosphates 280-160-250 MG Packet   Commonly known as:  NEUTRA-PHOS   Dose:  1 packet        Take 1 packet by mouth 2 times daily 0900, 1500, 2100.   Refills:  0        testosterone cypionate 200 MG/ML injection   Commonly known as:  DEPOTESTOTERONE   Dose:  100 mg        Inject 100 mg into the muscle once a week Fridays   Refills:  0        VITAMIN D (CHOLECALCIFEROL) PO   Dose:  2000 Units        Take 2,000 Units by mouth daily   Refills:  0        * Notice:  This list has 2 medication(s) that are the same as other medications prescribed for you. Read the directions carefully, and ask your doctor or other care provider to review them with you.            Procedures and tests performed during your visit     CBC with platelets differential    Comprehensive metabolic panel    Draw and hold blood cultures    Lactic acid whole blood    Peripheral IV: Standard    XR Chest 2 Views      Orders Needing Specimen Collection     None      Pending Results     No orders found from 12/9/2017 to 12/12/2017.             Pending Culture Results     No orders found from 12/9/2017 to 12/12/2017.            Pending Results Instructions     If you had any lab results that were not finalized at the time of your Discharge, you can call the ED Lab Result RN at [phone]. You will be contacted by this team for any positive Lab results or changes in treatment. The nurses are available 7 days a week from 10A to 6:30P.  You can leave a message 24 hours per day and they will return your call.        Test Results From Your Hospital Stay        12/11/2017  9:33 AM      Component Results     Component Value Ref Range & Units Status    WBC 8.4 4.0 - 11.0 10e9/L Final    RBC Count 5.11 4.4 - 5.9 10e12/L Final    Hemoglobin 15.6 13.3 - 17.7 g/dL Final    Hematocrit 45.7 40.0 - 53.0 % Final    MCV 89 78 - 100 fl Final    MCH 30.5 26.5 - 33.0 pg Final    MCHC 34.1 31.5 - 36.5 g/dL Final    RDW 15.2 (H) 10.0 - 15.0 % Final    Platelet Count 140 (L) 150 - 450 10e9/L Final    Diff Method Automated Method  Final    % Neutrophils 39.2 % Final    % Lymphocytes 39.2 % Final    % Monocytes 11.2 % Final    % Eosinophils 9.4 % Final    % Basophils 0.8 % Final    % Immature Granulocytes 0.2 % Final    Nucleated RBCs 0 0 /100 Final    Absolute Neutrophil 3.3 1.6 - 8.3 10e9/L Final    Absolute Lymphocytes 3.3 0.8 - 5.3 10e9/L Final    Absolute Monocytes 0.9 0.0 - 1.3 10e9/L Final    Absolute Eosinophils 0.8 (H) 0.0 - 0.7 10e9/L Final    Absolute Basophils 0.1 0.0 - 0.2 10e9/L Final    Abs Immature Granulocytes 0.0 0 - 0.4 10e9/L Final    Absolute Nucleated RBC 0.0  Final         12/11/2017  9:56 AM      Component Results     Component Value Ref Range & Units Status    Sodium 138 133 - 144 mmol/L Final    Potassium 3.9 3.4 - 5.3 mmol/L Final    Chloride 101 94 - 109 mmol/L Final    Carbon Dioxide 30 20 - 32 mmol/L Final    Anion Gap 7 3 - 14 mmol/L Final    Glucose 81 70 - 99 mg/dL Final    Urea Nitrogen 16 7 - 30 mg/dL Final    Creatinine 0.86 0.66 -  1.25 mg/dL Final    GFR Estimate >90 >60 mL/min/1.7m2 Final    Non  GFR Calc    GFR Estimate If Black >90 >60 mL/min/1.7m2 Final    African American GFR Calc    Calcium 9.1 8.5 - 10.1 mg/dL Final    Bilirubin Total 0.4 0.2 - 1.3 mg/dL Final    Albumin 3.5 3.4 - 5.0 g/dL Final    Protein Total 8.3 6.8 - 8.8 g/dL Final    Alkaline Phosphatase 90 40 - 150 U/L Final    ALT 31 0 - 70 U/L Final    AST 24 0 - 45 U/L Final         12/11/2017  9:32 AM      Component Results     Component Value Ref Range & Units Status    Lactic Acid 1.5 0.7 - 2.0 mmol/L Final         12/11/2017  9:36 AM      Narrative     XR CHEST 2 VW 12/11/2017 9:32 AM    HISTORY: Cough.    COMPARISON: 10/3/2017    FINDINGS: Low lung volume. No consolidation, effusion or pneumothorax.  Normal heart size.        Impression     IMPRESSION: No specific evidence of pneumonia.    SAMEER DELGADO MD                Clinical Quality Measure: Blood Pressure Screening     Your blood pressure was checked while you were in the emergency department today. The last reading we obtained was  BP: 114/75 . Please read the guidelines below about what these numbers mean and what you should do about them.  If your systolic blood pressure (the top number) is less than 120 and your diastolic blood pressure (the bottom number) is less than 80, then your blood pressure is normal. There is nothing more that you need to do about it.  If your systolic blood pressure (the top number) is 120-139 or your diastolic blood pressure (the bottom number) is 80-89, your blood pressure may be higher than it should be. You should have your blood pressure rechecked within a year by a primary care provider.  If your systolic blood pressure (the top number) is 140 or greater or your diastolic blood pressure (the bottom number) is 90 or greater, you may have high blood pressure. High blood pressure is treatable, but if left untreated over time it can put you at risk for heart attack,  "stroke, or kidney failure. You should have your blood pressure rechecked by a primary care provider within the next 4 weeks.  If your provider in the emergency department today gave you specific instructions to follow-up with your doctor or provider even sooner than that, you should follow that instruction and not wait for up to 4 weeks for your follow-up visit.        Thank you for choosing [facility]       Thank you for choosing [facility] for your care. Our goal is always to provide you with excellent care. Hearing back from our patients is one way we can continue to improve our services. Please take a few minutes to complete the written survey that you may receive in the mail after you visit with us. Thank you!        [facility] Information     [email or web address] lets you send messages to your doctor, view your test results, renew your prescriptions, schedule appointments and more. To sign up, go to [email or web address] . Click on \"Log in\" on the left side of the screen, which will take you to the Welcome page. Then click on \"Sign up Now\" on the right side of the page.     You will be asked to enter the access code listed below, as well as some personal information. Please follow the directions to create your username and password.     Your access code is: [email or web address]  Expires: 3/11/2018 12:43 PM     Your access code will  in 90 days. If you need help or a new code, please call your [facility] clinic or [phone].        Care EveryWhere ID     This is your Care EveryWhere ID. This could be used by other organizations to access your [facility] medical records  [id number]        Equal Access to Services     [facility] TASHIA : Hadii timi styles Sonikunj, waaxda luqadaha, qaybta kaalmada bismark, yaw contreras. So [facility] [phone].    ATENCIÓN: Si habla español, tiene a king disposición servicios gratuitos de asistencia lingüística. Llame al [phone].    We comply with applicable federal " civil rights laws and [place] laws. We do not discriminate on the basis of race, color, national origin, age, disability, sex, sexual orientation, or gender identity.            After Visit Summary       This is your record. Keep this with you and show to your community pharmacist(s) and doctor(s) at your next visit.

## 2017-12-11 NOTE — ED NOTES
Bed: ED28  Expected date:   Expected time:   Means of arrival:   Comments:  Kerri 2 - 42 F weakness eta 0863

## 2017-12-11 NOTE — ED PROVIDER NOTES
"  History     Chief Complaint:  Possible aspiration      HPI   History is limited due to patient condition  Keyon Farias is a 57 year old male who was brought in by ambulance due to his PCA's concern for aspiration pneumonia. Patient has \"gurgled\" in the past which has signaled possible illness and was noted to be gurgling by his PCA last night. Patient does not have a fever and he has not apparently had a cough. He indicates he feels fine.     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)                DVT of upper extremity (deep vein thrombosis) (H)              Gastro-esophageal reflux disease                Panhypopituitarism (H)                       Pneumonia                  Seizures (H)                Septic shock (H)                     Spastic hemiplegia affecting dominant side (H)                     Thyroid disease                      Tracheostomy care (H)                       Traumatic brain injury (H)                   Unspecified cerebral artery occlusion with cerebral infarction                      UTI (urinary tract infection)                 Ventricular fibrillation (H)                    Ventricular tachyarrhythmia (H)               Past Surgical History:    Endoscopic ultrasound upper gastrointestinal tract  EGD combined, x3  Endoscopic ultrasound, EGD, necronectomy, combined  Reconstructive facial injury  Laparoscopic appendectomy  Laparoscopic assisted insertion tube gastrostomy  Right hand repair  Tracheostomy z2  Vascular surgery     Family History:    History reviewed. No pertinent family history.      Social History:  The patient was accompanied to the ED by his " mother and family.  Smoking Status: Former smoker  Smokeless Tobacco: Never used  Alcohol Use: No  Marital Status:  Single [1]     Review of Systems   Constitutional: Negative for fever.   All other systems reviewed and are negative.    Reason unable to perform ROS: Condition of the patient and patient is nonverbal.     Physical Exam   First Vitals:  BP: 109/77  Pulse: 74  Temp: 98.5  F (36.9  C)  Resp: 18  SpO2: 92 %      Physical Exam  Nursing note and vitals reviewed.  Constitutional:  Awake and alert but non-verbal.    HENT:   Nose:    Nose normal.   Mouth/Throat:   Mucous membranes are normal.   Eyes:    Conjunctivae normal and EOM are normal.      Pupils are equal, round, and reactive to light.   Neck:    Trachea normal.   Cardiovascular:  Normal rate, regular rhythm, normal heart sounds and normal pulses. No murmur heard.  Pulmonary/Chest:  Faint rales in the bases, but normal effort and clear elsewhere.    Abdominal:   Soft. Normal appearance and bowel sounds are normal.      There is no tenderness.      There is no rebound and no CVA tenderness.   Musculoskeletal:  Extremities atraumatic x 4.   Lymphadenopathy:  No cervical adenopathy.   Neurological:   Awake and alert but nonverbal. Able to follow some simple commands but chronic, spastic paralysis to the right upper extremity and bilateral lower extremities.   Skin:    Skin is intact. No rash noted.   Psychiatric:   Normal mood and affect.      Emergency Department Course   Imaging:  XR Chest 2 Views   Final Result   IMPRESSION: No specific evidence of pneumonia.      SAMEER DELGADO MD           Laboratory:  CBC: RDW 15.2 H,  L Absolute eosinophils 0.8 H  CMP: WNL (Creat 0.86)  Lactic Acid 1.5     Emergency Department Course:  Past medical records, nursing notes, and vitals reviewed.  0829: I performed an exam of the patient and obtained history, as documented above.     IV inserted and blood drawn for the above work up to be conducted.     Findings  and plan explained to the Patient. Patient discharged home with instructions regarding supportive care, medications, and reasons to return. The importance of close follow-up was reviewed.      Impression & Plan    Medical Decision Making:  Keyon Farias is a 57 year old male sent in by ambulance due to concerns that he might be having an aspiration pneumonia. He is unable to provide any history himself due to his condition, however he had normal vital signs here and his work up here is unremarkable as well, including blood work and chest X ray. Therefore at this time he does not appear to have pneumonia. I think he is safe for discharge. I recommended follow up with his doctor and return for any worsening symptoms.       Diagnosis:    ICD-10-CM    1. Eval for possible aspiration pneumonitis (H) J69.0        Disposition:  discharged to home    Discharge Medications:  Discharge Medication List as of 12/11/2017 12:43 PM        Amy DYKES am serving as a scribe at 8:29 AM on 12/11/2017 to document services personally performed by Fam Farnsworth MD based on my observations and the provider's statements to me.      EMERGENCY DEPARTMENT       Fam Farnsworth MD  12/11/17 3024

## 2017-12-11 NOTE — DISCHARGE INSTRUCTIONS
Pneumonia (Adult)  Pneumonia is an infection deep within the lungs. It is in the small air sacs (alveoli). Pneumonia may be caused by a virus or bacteria. Pneumonia caused by bacteria is usually treated with an antibiotic. Severe cases may need to be treated in the hospital. Milder cases can be treated at home. Symptoms usually start to get better during the first 2 days of treatment.    Home care  Follow these guidelines when caring for yourself at home:    Rest at home for the first 2 to 3 days, or until you feel stronger. Don t let yourself get overly tired when you go back to your activities.    Stay away from cigarette smoke - yours or other people s.    You may use acetaminophen or ibuprofen to control fever or pain, unless another medicine was prescribed. If you have chronic liver or kidney disease, talk with your healthcare provider before using these medicines. Also talk with your provider if you ve had a stomach ulcer or gastrointestinal bleeding. Don t give aspirin to anyone younger than 18 years of age who is ill with a fever. It may cause severe liver damage.    Your appetite may be poor, so a light diet is fine.    Drink 6 to 8 glasses of fluids every day to make sure you are getting enough fluids. Beverages can include water, sport drinks, sodas without caffeine, juices, tea, or soup. Fluids will help loosen secretions in the lung. This will make it easier for you to cough up the phlegm (sputum). If you also have heart or kidney disease, check with your healthcare provider before you drink extra fluids.    Take antibiotic medicine prescribed until it is all gone, even if you are feeling better after a few days.  Follow-up care  Follow up with your healthcare provider in the next 2 to 3 days, or as advised. This is to be sure the medicine is helping you get better.  If you are 65 or older, you should get a pneumococcal vaccine and a yearly flu (influenza) shot. You should also get these vaccines if  you have chronic lung disease like asthma, emphysema, or COPD. Recently, a second type of pneumonia vaccine has become available for everyone over 65 years old. This is in addition to the previous vaccine. Ask your provider about this.  When to seek medical advice  Call your healthcare provider right away if any of these occur:    You don t get better within the first 48 hours of treatment    Shortness of breath gets worse    Rapid breathing (more than 25 breaths per minute)    Coughing up blood    Chest pain gets worse with breathing    Fever of 100.4 F (38 C) or higher that doesn t get better with fever medicine    Weakness, dizziness, or fainting that gets worse    Thirst or dry mouth that gets worse    Sinus pain, headache, or a stiff neck    Chest pain not caused by coughing  Date Last Reviewed: 1/1/2017 2000-2017 [employer]. [address] [place] [address], [place] [address]. All rights reserved. This information is not intended as a substitute for professional medical care. Always follow your healthcare professional's instructions.

## 2017-12-26 ENCOUNTER — HOSPITAL ENCOUNTER (INPATIENT)
Facility: CLINIC | Age: 57
LOS: 5 days | Discharge: HOME HEALTH | DRG: 178 | End: 2017-12-31
Attending: EMERGENCY MEDICINE | Admitting: INTERNAL MEDICINE
Payer: MEDICARE

## 2017-12-26 ENCOUNTER — APPOINTMENT (OUTPATIENT)
Dept: GENERAL RADIOLOGY | Facility: CLINIC | Age: 57
DRG: 178 | End: 2017-12-26
Attending: EMERGENCY MEDICINE
Payer: MEDICARE

## 2017-12-26 DIAGNOSIS — R50.9 FEVER, UNSPECIFIED FEVER CAUSE: ICD-10-CM

## 2017-12-26 DIAGNOSIS — R65.20 SEVERE SEPSIS (H): ICD-10-CM

## 2017-12-26 DIAGNOSIS — T85.598A OBSTRUCTION OF FEEDING TUBE, INITIAL ENCOUNTER: ICD-10-CM

## 2017-12-26 DIAGNOSIS — A41.9 SEVERE SEPSIS (H): ICD-10-CM

## 2017-12-26 DIAGNOSIS — R05.9 COUGH: ICD-10-CM

## 2017-12-26 LAB
ALBUMIN SERPL-MCNC: 3.6 G/DL (ref 3.4–5)
ALBUMIN UR-MCNC: 10 MG/DL
ALP SERPL-CCNC: 98 U/L (ref 40–150)
ALT SERPL W P-5'-P-CCNC: 35 U/L (ref 0–70)
ANION GAP SERPL CALCULATED.3IONS-SCNC: 6 MMOL/L (ref 3–14)
APPEARANCE UR: CLEAR
AST SERPL W P-5'-P-CCNC: 26 U/L (ref 0–45)
BASOPHILS # BLD AUTO: 0.1 10E9/L (ref 0–0.2)
BASOPHILS NFR BLD AUTO: 0.7 %
BILIRUB DIRECT SERPL-MCNC: 0.1 MG/DL (ref 0–0.2)
BILIRUB SERPL-MCNC: 0.5 MG/DL (ref 0.2–1.3)
BILIRUB UR QL STRIP: NEGATIVE
BUN SERPL-MCNC: 18 MG/DL (ref 7–30)
CALCIUM SERPL-MCNC: 9.4 MG/DL (ref 8.5–10.1)
CHLORIDE SERPL-SCNC: 102 MMOL/L (ref 94–109)
CO2 SERPL-SCNC: 32 MMOL/L (ref 20–32)
COLOR UR AUTO: YELLOW
CREAT SERPL-MCNC: 1.09 MG/DL (ref 0.66–1.25)
DIFFERENTIAL METHOD BLD: NORMAL
EOSINOPHIL # BLD AUTO: 0.6 10E9/L (ref 0–0.7)
EOSINOPHIL NFR BLD AUTO: 5.9 %
ERYTHROCYTE [DISTWIDTH] IN BLOOD BY AUTOMATED COUNT: 14.6 % (ref 10–15)
FLUAV+FLUBV AG SPEC QL: NEGATIVE
FLUAV+FLUBV AG SPEC QL: NEGATIVE
GFR SERPL CREATININE-BSD FRML MDRD: 70 ML/MIN/1.7M2
GLUCOSE SERPL-MCNC: 86 MG/DL (ref 70–99)
GLUCOSE UR STRIP-MCNC: NEGATIVE MG/DL
HCT VFR BLD AUTO: 47.9 % (ref 40–53)
HGB BLD-MCNC: 16.2 G/DL (ref 13.3–17.7)
HGB UR QL STRIP: NEGATIVE
IMM GRANULOCYTES # BLD: 0 10E9/L (ref 0–0.4)
IMM GRANULOCYTES NFR BLD: 0.3 %
KETONES UR STRIP-MCNC: NEGATIVE MG/DL
LACTATE BLD-SCNC: 2 MMOL/L (ref 0.7–2)
LACTATE BLD-SCNC: 2.3 MMOL/L (ref 0.7–2)
LEUKOCYTE ESTERASE UR QL STRIP: NEGATIVE
LIPASE SERPL-CCNC: 363 U/L (ref 73–393)
LYMPHOCYTES # BLD AUTO: 2.9 10E9/L (ref 0.8–5.3)
LYMPHOCYTES NFR BLD AUTO: 28.8 %
MCH RBC QN AUTO: 31 PG (ref 26.5–33)
MCHC RBC AUTO-ENTMCNC: 33.8 G/DL (ref 31.5–36.5)
MCV RBC AUTO: 92 FL (ref 78–100)
MONOCYTES # BLD AUTO: 1.3 10E9/L (ref 0–1.3)
MONOCYTES NFR BLD AUTO: 12.4 %
NEUTROPHILS # BLD AUTO: 5.3 10E9/L (ref 1.6–8.3)
NEUTROPHILS NFR BLD AUTO: 51.9 %
NITRATE UR QL: NEGATIVE
NRBC # BLD AUTO: 0 10*3/UL
NRBC BLD AUTO-RTO: 0 /100
PH UR STRIP: 7 PH (ref 5–7)
PLATELET # BLD AUTO: 155 10E9/L (ref 150–450)
POTASSIUM SERPL-SCNC: 3.8 MMOL/L (ref 3.4–5.3)
PROCALCITONIN SERPL-MCNC: 0.05 NG/ML
PROT SERPL-MCNC: 8.7 G/DL (ref 6.8–8.8)
RBC # BLD AUTO: 5.23 10E12/L (ref 4.4–5.9)
RBC #/AREA URNS AUTO: 2 /HPF (ref 0–2)
SODIUM SERPL-SCNC: 140 MMOL/L (ref 133–144)
SOURCE: ABNORMAL
SP GR UR STRIP: 1.02 (ref 1–1.03)
SPECIMEN SOURCE: NORMAL
UROBILINOGEN UR STRIP-MCNC: 4 MG/DL (ref 0–2)
WBC # BLD AUTO: 10.2 10E9/L (ref 4–11)
WBC #/AREA URNS AUTO: <1 /HPF (ref 0–2)

## 2017-12-26 PROCEDURE — 99207 ZZC NO CHARGE VISIT/PATIENT NOT SEEN: CPT | Performed by: PHYSICIAN ASSISTANT

## 2017-12-26 PROCEDURE — 80048 BASIC METABOLIC PNL TOTAL CA: CPT | Performed by: EMERGENCY MEDICINE

## 2017-12-26 PROCEDURE — 83690 ASSAY OF LIPASE: CPT | Performed by: EMERGENCY MEDICINE

## 2017-12-26 PROCEDURE — 25000125 ZZHC RX 250: Performed by: PHYSICIAN ASSISTANT

## 2017-12-26 PROCEDURE — 25000132 ZZH RX MED GY IP 250 OP 250 PS 637: Mod: GY | Performed by: EMERGENCY MEDICINE

## 2017-12-26 PROCEDURE — 83690 ASSAY OF LIPASE: CPT | Performed by: PHYSICIAN ASSISTANT

## 2017-12-26 PROCEDURE — 96367 TX/PROPH/DG ADDL SEQ IV INF: CPT

## 2017-12-26 PROCEDURE — 87804 INFLUENZA ASSAY W/OPTIC: CPT | Performed by: EMERGENCY MEDICINE

## 2017-12-26 PROCEDURE — 71020 XR CHEST 2 VW: CPT

## 2017-12-26 PROCEDURE — 36415 COLL VENOUS BLD VENIPUNCTURE: CPT

## 2017-12-26 PROCEDURE — 99285 EMERGENCY DEPT VISIT HI MDM: CPT | Mod: 25

## 2017-12-26 PROCEDURE — 96366 THER/PROPH/DIAG IV INF ADDON: CPT

## 2017-12-26 PROCEDURE — A9270 NON-COVERED ITEM OR SERVICE: HCPCS | Mod: GY | Performed by: EMERGENCY MEDICINE

## 2017-12-26 PROCEDURE — 87086 URINE CULTURE/COLONY COUNT: CPT | Performed by: EMERGENCY MEDICINE

## 2017-12-26 PROCEDURE — A9270 NON-COVERED ITEM OR SERVICE: HCPCS | Mod: GY | Performed by: PHYSICIAN ASSISTANT

## 2017-12-26 PROCEDURE — 84145 PROCALCITONIN (PCT): CPT | Performed by: EMERGENCY MEDICINE

## 2017-12-26 PROCEDURE — 81001 URINALYSIS AUTO W/SCOPE: CPT | Performed by: EMERGENCY MEDICINE

## 2017-12-26 PROCEDURE — 25000132 ZZH RX MED GY IP 250 OP 250 PS 637: Mod: GY | Performed by: INTERNAL MEDICINE

## 2017-12-26 PROCEDURE — 96375 TX/PRO/DX INJ NEW DRUG ADDON: CPT

## 2017-12-26 PROCEDURE — 83605 ASSAY OF LACTIC ACID: CPT | Performed by: EMERGENCY MEDICINE

## 2017-12-26 PROCEDURE — 96365 THER/PROPH/DIAG IV INF INIT: CPT

## 2017-12-26 PROCEDURE — 25000125 ZZHC RX 250: Performed by: EMERGENCY MEDICINE

## 2017-12-26 PROCEDURE — 85025 COMPLETE CBC W/AUTO DIFF WBC: CPT | Performed by: EMERGENCY MEDICINE

## 2017-12-26 PROCEDURE — 25000132 ZZH RX MED GY IP 250 OP 250 PS 637: Mod: GY | Performed by: PHYSICIAN ASSISTANT

## 2017-12-26 PROCEDURE — 12000007 ZZH R&B INTERMEDIATE

## 2017-12-26 PROCEDURE — 96361 HYDRATE IV INFUSION ADD-ON: CPT

## 2017-12-26 PROCEDURE — 87040 BLOOD CULTURE FOR BACTERIA: CPT | Performed by: EMERGENCY MEDICINE

## 2017-12-26 PROCEDURE — A9270 NON-COVERED ITEM OR SERVICE: HCPCS | Mod: GY | Performed by: INTERNAL MEDICINE

## 2017-12-26 PROCEDURE — 80076 HEPATIC FUNCTION PANEL: CPT | Performed by: EMERGENCY MEDICINE

## 2017-12-26 PROCEDURE — 99223 1ST HOSP IP/OBS HIGH 75: CPT | Mod: AI | Performed by: PHYSICIAN ASSISTANT

## 2017-12-26 PROCEDURE — 25000128 H RX IP 250 OP 636: Performed by: PHYSICIAN ASSISTANT

## 2017-12-26 PROCEDURE — 25000128 H RX IP 250 OP 636: Performed by: EMERGENCY MEDICINE

## 2017-12-26 RX ORDER — NALOXONE HYDROCHLORIDE 0.4 MG/ML
.1-.4 INJECTION, SOLUTION INTRAMUSCULAR; INTRAVENOUS; SUBCUTANEOUS
Status: DISCONTINUED | OUTPATIENT
Start: 2017-12-26 | End: 2017-12-31 | Stop reason: HOSPADM

## 2017-12-26 RX ORDER — SODIUM CHLORIDE 9 MG/ML
INJECTION, SOLUTION INTRAVENOUS CONTINUOUS
Status: DISCONTINUED | OUTPATIENT
Start: 2017-12-26 | End: 2017-12-27

## 2017-12-26 RX ORDER — CARBAMAZEPINE 100 MG/5ML
150 SUSPENSION ORAL ONCE
Status: DISCONTINUED | OUTPATIENT
Start: 2017-12-26 | End: 2017-12-26

## 2017-12-26 RX ORDER — ONDANSETRON 4 MG/1
4 TABLET, ORALLY DISINTEGRATING ORAL EVERY 6 HOURS PRN
Status: DISCONTINUED | OUTPATIENT
Start: 2017-12-26 | End: 2017-12-31 | Stop reason: HOSPADM

## 2017-12-26 RX ORDER — BACITRACIN ZINC 500 [USP'U]/G
OINTMENT TOPICAL DAILY PRN
Status: DISCONTINUED | OUTPATIENT
Start: 2017-12-26 | End: 2017-12-31 | Stop reason: HOSPADM

## 2017-12-26 RX ORDER — HYDROCORTISONE 10 MG/1
20 TABLET ORAL EVERY MORNING
Status: DISCONTINUED | OUTPATIENT
Start: 2017-12-27 | End: 2017-12-31 | Stop reason: HOSPADM

## 2017-12-26 RX ORDER — GUAR GUM
1 PACKET (EA) ORAL 3 TIMES DAILY
Status: DISCONTINUED | OUTPATIENT
Start: 2017-12-26 | End: 2017-12-31 | Stop reason: HOSPADM

## 2017-12-26 RX ORDER — PROCHLORPERAZINE 25 MG
25 SUPPOSITORY, RECTAL RECTAL EVERY 12 HOURS PRN
Status: DISCONTINUED | OUTPATIENT
Start: 2017-12-26 | End: 2017-12-31 | Stop reason: HOSPADM

## 2017-12-26 RX ORDER — CARBAMAZEPINE 100 MG/5ML
150 SUSPENSION ORAL EVERY 6 HOURS
Status: DISCONTINUED | OUTPATIENT
Start: 2017-12-26 | End: 2017-12-31 | Stop reason: HOSPADM

## 2017-12-26 RX ORDER — ASPIRIN 81 MG/1
81 TABLET, CHEWABLE ORAL DAILY
Status: DISCONTINUED | OUTPATIENT
Start: 2017-12-26 | End: 2017-12-31 | Stop reason: HOSPADM

## 2017-12-26 RX ORDER — CARBAMAZEPINE 100 MG/5ML
150 SUSPENSION ORAL EVERY 6 HOURS
Status: ON HOLD | COMMUNITY
End: 2018-09-21

## 2017-12-26 RX ORDER — ACETAMINOPHEN 325 MG/1
650 TABLET ORAL EVERY 4 HOURS PRN
Status: DISCONTINUED | OUTPATIENT
Start: 2017-12-26 | End: 2017-12-28

## 2017-12-26 RX ORDER — CARBAMAZEPINE 100 MG/5ML
150 SUSPENSION ORAL ONCE
Status: COMPLETED | OUTPATIENT
Start: 2017-12-26 | End: 2017-12-26

## 2017-12-26 RX ORDER — BISACODYL 10 MG
10 SUPPOSITORY, RECTAL RECTAL DAILY PRN
Status: DISCONTINUED | OUTPATIENT
Start: 2017-12-26 | End: 2017-12-31 | Stop reason: HOSPADM

## 2017-12-26 RX ORDER — HYDROCORTISONE 10 MG/1
10 TABLET ORAL EVERY EVENING
Status: DISCONTINUED | OUTPATIENT
Start: 2017-12-26 | End: 2017-12-31 | Stop reason: HOSPADM

## 2017-12-26 RX ORDER — LANOLIN ALCOHOL/MO/W.PET/CERES
6 CREAM (GRAM) TOPICAL
Status: DISCONTINUED | OUTPATIENT
Start: 2017-12-26 | End: 2017-12-31 | Stop reason: HOSPADM

## 2017-12-26 RX ORDER — ONDANSETRON 2 MG/ML
4 INJECTION INTRAMUSCULAR; INTRAVENOUS EVERY 6 HOURS PRN
Status: DISCONTINUED | OUTPATIENT
Start: 2017-12-26 | End: 2017-12-31 | Stop reason: HOSPADM

## 2017-12-26 RX ORDER — CIPROFLOXACIN 2 MG/ML
400 INJECTION, SOLUTION INTRAVENOUS ONCE
Status: COMPLETED | OUTPATIENT
Start: 2017-12-26 | End: 2017-12-26

## 2017-12-26 RX ORDER — LEVOTHYROXINE SODIUM 150 UG/1
150 TABLET ORAL
Status: DISCONTINUED | OUTPATIENT
Start: 2017-12-27 | End: 2017-12-31 | Stop reason: HOSPADM

## 2017-12-26 RX ORDER — PROCHLORPERAZINE MALEATE 10 MG
10 TABLET ORAL EVERY 6 HOURS PRN
Status: DISCONTINUED | OUTPATIENT
Start: 2017-12-26 | End: 2017-12-31 | Stop reason: HOSPADM

## 2017-12-26 RX ORDER — MEROPENEM AND SODIUM CHLORIDE 1 G/50ML
1 INJECTION, SOLUTION INTRAVENOUS ONCE
Status: COMPLETED | OUTPATIENT
Start: 2017-12-26 | End: 2017-12-26

## 2017-12-26 RX ORDER — SODIUM CHLORIDE 9 MG/ML
INJECTION, SOLUTION INTRAVENOUS CONTINUOUS
Status: DISCONTINUED | OUTPATIENT
Start: 2017-12-26 | End: 2017-12-26

## 2017-12-26 RX ADMIN — CIPROFLOXACIN 400 MG: 2 INJECTION, SOLUTION INTRAVENOUS at 11:56

## 2017-12-26 RX ADMIN — BRIVARACETAM 100 MG: 50 INJECTION, SUSPENSION INTRAVENOUS at 14:41

## 2017-12-26 RX ADMIN — SODIUM CHLORIDE: 9 INJECTION, SOLUTION INTRAVENOUS at 17:53

## 2017-12-26 RX ADMIN — SODIUM CHLORIDE: 9 INJECTION, SOLUTION INTRAVENOUS at 21:54

## 2017-12-26 RX ADMIN — SODIUM CHLORIDE 1000 ML: 9 INJECTION, SOLUTION INTRAVENOUS at 10:17

## 2017-12-26 RX ADMIN — SODIUM CHLORIDE 500 ML: 9 INJECTION, SOLUTION INTRAVENOUS at 16:54

## 2017-12-26 RX ADMIN — MEROPENEM AND SODIUM CHLORIDE 1 G: 1 INJECTION, SOLUTION INTRAVENOUS at 11:21

## 2017-12-26 RX ADMIN — VANCOMYCIN HYDROCHLORIDE 1500 MG: 5 INJECTION, POWDER, LYOPHILIZED, FOR SOLUTION INTRAVENOUS at 13:21

## 2017-12-26 RX ADMIN — ASPIRIN 81 MG 81 MG: 81 TABLET ORAL at 17:53

## 2017-12-26 RX ADMIN — ACETAMINOPHEN 650 MG: 325 TABLET, FILM COATED ORAL at 21:42

## 2017-12-26 RX ADMIN — CARBAMAZEPINE 150 MG: 100 SUSPENSION ORAL at 14:34

## 2017-12-26 RX ADMIN — SODIUM CHLORIDE 1000 ML: 9 INJECTION, SOLUTION INTRAVENOUS at 11:55

## 2017-12-26 RX ADMIN — BRIVARACETAM 100 MG: 50 INJECTION, SUSPENSION INTRAVENOUS at 21:41

## 2017-12-26 RX ADMIN — CARBAMAZEPINE 150 MG: 100 SUSPENSION ORAL at 21:42

## 2017-12-26 RX ADMIN — HYDROCORTISONE 10 MG: 10 TABLET ORAL at 21:42

## 2017-12-26 RX ADMIN — CARBAMAZEPINE 150 MG: 100 SUSPENSION ORAL at 17:53

## 2017-12-26 ASSESSMENT — ENCOUNTER SYMPTOMS
VOMITING: 1
FEVER: 1
COUGH: 1

## 2017-12-26 NOTE — ED PROVIDER NOTES
History     Chief Complaint:  Cough and G-J tube problem      HPI  History limited secondary to aphasia (history of TBI)  Keyon Farias is a 57 year old male with a history of sepsis and septic shock who presents to the ED for evaluation of cough.  The patient has had a non-productive cough since 12/24, with an objective fever of 100.8F. Last night, the patient had one episode of vomiting, and this morning, his feeding tube became clogged, prompting his visit to the ED. Of note, the patient was admitted from 10/3-10/7 for severe sepsis secondary to pneumonia and does have history of aspiration.    Allergies:  Dilantin  Valproic acid     Medications:    carBAMazepine (TEGRETOL) 100 MG/5ML suspension  Brivaracetam (BRIVIACT) 10 MG/ML solution  pantoprazole (PROTONIX) SUSP suspension  potassium & sodium phosphates (NEUTRA-PHOS) 280-160-250 MG Packet  VITAMIN D, CHOLECALCIFEROL, PO  bacitracin ointment  melatonin 1 MG/ML LIQD liquid  aspirin 10 mg/mL  multivitamins with minerals (CERTAVITE/CEROVITE) LIQD liquid  fiber modular (NUTRISOURCE FIBER) packet  calcium carbonate 1250 (500 CA) MG/5ML SUSP suspension  levothyroxine (SYNTHROID) 25 mcg/mL  hydrocortisone (CORTEF) 2 mg/mL  hydrocortisone (CORTEF) 2 mg/mL  bisacodyl (DULCOLAX) 10 MG Suppository  miconazole (MICATIN; MICRO GUARD) 2 % powder  polyethylene glycol (MIRALAX/GLYCOLAX) Packet  ACETAMINOPHEN PO  testosterone cypionate (DEPOTESTOTERONE CYPIONATE) 200 MG/ML injection    Past Medical History:    Acute respiratory failure with hypoxia  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)   DVT of upper extremity (deep vein thrombosis) (H)   Gastro-oesophageal reflux disease   Panhypopituitarism (H)   Pneumonia   Seizures (H)   Septic shock (H)   Spastic hemiplegia affecting dominant side (H)   Thyroid disease   Tracheostomy care (H)   Traumatic brain injury (H)   Unspecified cerebral artery occlusion with cerebral infarction   UTI (urinary tract  "infection)   Ventricular fibrillation (H)   Ventricular tachyarrhythmia (H)  PEG tube malfunction    Past Surgical History:    Head and neck surgery  Laparoscopic appendectomy  Laparoscopic assisted insertion tube gastrotomy  Right hand repair  Tracheostomy  Vascular surgery    Family History:    No family history on file.    Social History:  Presents alone.  Former smoker: quit in 1989  Negative for alcohol use.  Marital Status:  Single [1]    Review of Systems   Unable to perform ROS: Other   Constitutional: Positive for fever.   Respiratory: Positive for cough.    Gastrointestinal: Positive for vomiting.        Positive for G-tube problem.      Physical Exam   First Vitals:  BP: 104/69  Pulse: 126  Temp: 100  F (37.8  C)  Resp: 20  Height: 180.3 cm (5' 11\")  Weight: 77.1 kg (170 lb)  SpO2: 94 %    Physical Exam  General: Alert and cooperative with exam. Patient in moderate distress.  Baseline mentation.  Aphasic  Head:  Scalp is NC/AT  Eyes:  No scleral icterus, PERRL  ENT:  The external nose and ears are normal. The oropharynx is normal and without erythema; mucus membranes are somewhat dry. Uvula midline, no evidence of deep space infection.  Neck:  Normal range of motion without rigidity.  CV:  Tachycardic   Resp:  Breath sounds are clear bilaterally, dry cough on exam, exam limited by poor effort    Respirations are labored  GI:  Abdomen is soft, no distension, no tenderness. No peritoneal signs. JG-tube in place, 18 Greek, thick tan sediment in tube.   MS:  No lower extremity edema   Skin:  Warm and dry, No rash or lesions noted.  Neuro: Alert and cooperative with exam.  Expressive aphasia.  Baseline right-sided hemiparesis      Emergency Department Course   Imaging:  Radiographic findings were communicated with the patient who voiced understanding of the findings.    XR Chest, PA and LAT:   IMPRESSION: Minimal right basilar atelectasis. No specific evidence of  pneumonia. As per radiology. "     Laboratory:  1031 Lactic acid whole blood: 2.3 (H)  1352 Lactic acid whole blood: 2.0    CBC: WBC: 10.2, HGB: 16.2, PLT: 155  BMP: Glucose 86, o/w WNL (Creatinine: 1.09)    Influenza A/B antigen: Negative for A/B  Hepatic panel: WNL    Procalcitonin: 0.05    UA with micro: albumin 10 (A), urobilinogen 4.0 (H), o/w negative  Urine culture aerobic bacteria: pending  Blood culture x2: pending    Interventions:  1017 NS 1L IV  1155 NS 1L IV  1121 Merrem 1g IV  1321 Vancocin 1,500 mg IV  1156 Cipro 400 mg IV    Emergency Department Course:  Nursing notes and vitals reviewed. 1015 I performed an exam of the patient as documented above.     IV inserted. Medicine administered as documented above. Blood drawn. This was sent to the lab for further testing, results above. The patient provided a urine sample here in the emergency department. This was sent for laboratory testing, findings above.     The patient was sent for a Chest XR while in the emergency department, findings above.     1104 I rechecked the patient and discussed the results of his workup thus far.     1404  I consulted with Dr. Christianson of the hospitalist services. They are in agreement to accept the patient for admission.    Findings and plan explained to the Patient who consents to admission. Discussed the patient with Dr. Christianson, who will admit the patient to a [facility] bed with contact isolation bed for further monitoring, evaluation, and treatment.    Impression & Plan      CMS Diagnoses:  The patient has signs of Severe Sepsis as evidenced by:    1. 2 SIRS criteria, AND  2. Suspected infection, AND   3. Organ dysfunction: Lactic Acid >2    Time severe sepsis diagnosis confirmed = 1031 as this was the time when Lactate resulted, and the level was >2      3 Hour Severe Sepsis Bundle Completion:  1. Initial Lactic Acid Result:   Recent Labs   Lab Test  12/26/17   0930  12/11/17   0856  10/05/17   0025   LACT  2.3*  1.5  2.1*     2. Blood Cultures before  Antibiotics: Yes  3. Broad Spectrum Antibiotics Administered: Yes - meropenem     Anti-infectives (Future)    Start     Dose/Rate Route Frequency Ordered Stop    12/26/17 1122  vancomycin (VANCOCIN) 1500 mg in 0.9% NaCl 250 mL PREMIX      1,500 mg  166.7 mL/hr over 90 Minutes Intravenous ONCE 12/26/17 1121          4.  Received 2 L IV fluid in the ED prior to admission    6 Hour Severe Sepsis Bundle Completion:  1. Repeat Lactic Acid Level: 2.0  2. MAP>65 after initial IVF bolus, will continue to monitor fluid status and vital signs  I attest to having performed a repeat sepsis exam and assessment of perfusion at 1300 and the results demonstrate improved perfusion.      Medical Decision Making:  Keyon Farias is a 57 year old male who presents with a G-J tube obstruction, productive cough, tachycardia, and reported fever. Patient's medical history and records were reviewed. Initial consideration for, but not limited to, feeding tube obstruction, pneumonia, UTI, other infectious process, dehydration, among others. Labs and imaging were obtained. UA without evidence of infection; urine sent for culture. Blood work was essentially unremarkable, as noted above, with exception of elevated lactic acid (2.3). Procalcitonin demonstrates low risk for systemic infection (0.05). Chest XR demonstrates minimal right basilar atelectasis, however, given the patient's clinical presentation of fever, productive cough, and tachycardia, there is of concern for severe sepsis secondary to developing pneumonia/aspiration. Patient was provided meropenum, vancomycin, and Cipro in the ED, as well as IV fluids. Clogged Zapper was placed in G-J tube with resolution of blockage. Patient will be admitted to the hospital service for further care and evaluation.       Diagnosis:    ICD-10-CM    1. Fever, unspecified fever cause R50.9 Blood culture     Blood culture     Lactic acid whole blood     Lipase     Lipase     CANCELED: Lipase   2. Cough  R05    3. Obstruction of feeding tube, initial encounter T85.598A    4. Severe sepsis (H) A41.9     R65.20        Disposition:  Admitted to Dr. Christianson of the hospitalist service.      I, Caridad Mascorro, am serving as a scribe on 12/26/2017 at 10:15 AM to personally document services performed by Edgar Miles DO based on my observations and the provider's statements to me.       Caridad Mascorro  12/26/2017    EMERGENCY DEPARTMENT       Edgar Miles DO  12/26/17 1832

## 2017-12-26 NOTE — PHARMACY-ADMISSION MEDICATION HISTORY
Admission medication history interview status for the 12/26/2017  admission is complete. See EPIC admission navigator for prior to admission medications     Medication history source reliability:Moderate    Actions taken by pharmacist (provider contacted, etc):discussed med list with mother     Additional medication history information not noted on PTA med list :carbamazepine increased to q6h per mother    Medication reconciliation/reorder completed by provider prior to medication history? No    Time spent in this activity: 20 minutes    Prior to Admission medications    Medication Sig Last Dose Taking? Auth Provider   carBAMazepine (TEGRETOL) 100 MG/5ML suspension Take 150 mg by mouth every 6 hours 12/26/2017 at 0600 Yes Unknown, Entered By History   melatonin (MELATONIN) 1 MG/ML LIQD liquid 6 mg by Jejunal Tube route nightly as needed for sleep  Yes Unknown, Entered By History   Brivaracetam (BRIVIACT) 10 MG/ML solution Take 100 mg by mouth 2 times daily 12/25/2017 at pm Yes Reported, Patient   pantoprazole (PROTONIX) SUSP suspension Take 20 mg by mouth 2 times daily Past Week at Unknown time Yes Reported, Patient   potassium & sodium phosphates (NEUTRA-PHOS) 280-160-250 MG Packet Take 1 packet by mouth 3 times daily 0900, 1500, 2100.  12/25/2017 at Unknown time Yes Unknown, Entered By History   VITAMIN D, CHOLECALCIFEROL, PO Take 2,000 Units by mouth daily 12/25/2017 at Unknown time Yes Unknown, Entered By History   bacitracin ointment Apply topically daily as needed for wound care To PEG site.  Yes Unknown, Entered By History   aspirin 10 mg/mL 8.1 mLs (81 mg) by Per J Tube route daily 12/25/2017 at Unknown time Yes Mariana Venegas MD   multivitamins with minerals (CERTAVITE/CEROVITE) LIQD liquid 15 mLs by Per J Tube route daily 12/25/2017 at am Yes Mariana Venegas MD   calcium carbonate 1250 (500 CA) MG/5ML SUSP suspension 5 mLs (1,250 mg) by Per J Tube route 3 times daily (with meals)  Yes  Mariana Venegas MD   levothyroxine (SYNTHROID) 25 mcg/mL 6 mLs (150 mcg) by Per J Tube route every morning (before breakfast)  Patient taking differently: 150 mcg by Per J Tube route every morning (before breakfast) Hold tube feeding 1 hour prior and 1 hour after medication administered. 12/26/2017 at 0500 Yes Mariana Venegas MD   hydrocortisone (CORTEF) 2 mg/mL 10 mLs (20 mg) by Per J Tube route every morning 12/25/2017 at am Yes Mariana Venegas MD   hydrocortisone (CORTEF) 2 mg/mL Take 5 mLs (10 mg) by mouth every evening 12/25/2017 at pm Yes Mariana Venegas MD   bisacodyl (DULCOLAX) 10 MG Suppository Place 1 suppository (10 mg) rectally daily as needed for constipation  Yes Mariana Venegas MD   miconazole (MICATIN; MICRO GUARD) 2 % powder Apply topically every hour as needed for other (topical candidiasis)  Yes Alicia Roca APRN CNP   ACETAMINOPHEN  mg by Per Feeding Tube route every 4 hours as needed for pain 12/26/2017 at 0600 Yes Unknown, Entered By History   testosterone cypionate (DEPOTESTOTERONE CYPIONATE) 200 MG/ML injection Inject 100 mg into the muscle See Admin Instructions Every three weeks. Unknown due date.  Yes Unknown, Entered By History   fiber modular (NUTRISOURCE FIBER) packet 1 packet by Per Feeding Tube route 3 times daily   Mariana Venegas MD

## 2017-12-26 NOTE — IP AVS SNAPSHOT
MRN:[id number]                      After Visit Summary   12/26/2017    Keyon Farias    MRN: [id number]           Thank you!     Thank you for choosing [facility] for your care. Our goal is always to provide you with excellent care. Hearing back from our patients is one way we can continue to improve our services. Please take a few minutes to complete the written survey that you may receive in the mail after you visit with us. Thank you!        Patient Information     Date Of Birth          1960        Designated Caregiver       Most Recent Value    Caregiver    Will someone help with your care after discharge? yes    Name of designated caregiver [facility]    Phone number of caregiver [phone]    Caregiver address [address]      About your hospital stay     You were admitted on:  December 26, 2017 You last received care in the:  [address] [facility]    You were discharged on:  December 31, 2017        Reason for your hospital stay       Admitted for respiratory distress, diagnosed with RSV.  Now improved and able to go back to home living situation.                  Who to Call     For medical emergencies, please call 911.  For non-urgent questions about your medical care, please call your primary care provider or clinic, [phone]          Attending Provider     Provider Specialty    Edgar Miles DO Emergency Medicine    Israel Chrisitanson MD Internal Medicine       Primary Care Provider Office Phone # Fax #    [facility] [phone]      After Care Instructions     Diet       Follow this diet upon discharge: Orders Placed This Encounter      Adult Formula Drip Feeding: Continuous Isosource 1.5; Jejunostomy; Goal Rate: 60; mL/hr; Medication - Tube Feeding Flush Frequency: At least 15-30 mL water before and after medication administration and with tube clogging; Amout to Send (Nutrition...      NPO for  Medical/Clinical Reasons Except for: Other; Specify: NPO For oral intake and gastric feedings for 6 hours post insertio            Discharge Instructions       If questions or problems arise regarding tube function (e.g. leaking, dislodges, etc.) Contact Interventional Radiology department 24 hours a day.     For procedures that were done at [facility]:    8 AM - 4 PM Monday through Friday Contact the Nurse Line [phone]  For afterhours and weekends [phone]    Ask for the Interventional Radiologist-on call.     For procedures that were done at [facility]:  8 AM - 4 PM Monday through Friday Contact   [phone]  For afterhours and weekends: [phone]   Ask for the Interventional Radiologist on call.     For procedures that were done at [facility]:  Contact  Interventional Radiology -  [phone]  Ask for the Interventional Radiologist on call    If DIRECTED by the RADIOLOGIST, related to specific problems with the tube functioning,  go to the Emergency Department.                  Follow-up Appointments     Follow-up and recommended labs and tests        Follow up with primary care provider, [facility], within 7 days for hospital follow- up.  No follow up labs or test are needed.                  Pending Results     Date and Time Order Name Status Description    12/26/2017 1114 Blood culture Preliminary     12/26/2017 1114 Blood culture Preliminary             Statement of Approval     Ordered          12/31/17 1218  I have reviewed and agree with all the recommendations and orders detailed in this document.  EFFECTIVE NOW     Approved and electronically signed by:  Jossie Higgins MD             Admission Information     Date & Time Provider Department Dept. Phone    12/26/2017 Israel Christianson MD [facility] [address] [facility] [phone]      Your Vitals Were     Blood Pressure Pulse Temperature Respirations Height Weight    99/59 (BP  "Location: Left arm) 86 97.8  F (36.6  C) (Axillary) 20 1.803 m (5' 11\") 76.2 kg (168 lb)    Pulse Oximetry BMI (Body Mass Index)                96% 23.43 kg/m2          Phigital Information     Phigital lets you send messages to your doctor, view your test results, renew your prescriptions, schedule appointments and more. To sign up, go to [email or web address] . Click on \"Log in\" on the left side of the screen, which will take you to the Welcome page. Then click on \"Sign up Now\" on the right side of the page.     You will be asked to enter the access code listed below, as well as some personal information. Please follow the directions to create your username and password.     Your access code is: [email or web address]  Expires: 3/11/2018 12:43 PM     Your access code will  in 90 days. If you need help or a new code, please call your [facility] clinic or [phone].        Care EveryWhere ID     This is your Care EveryWhere ID. This could be used by other organizations to access your [facility] medical records  [id number]        Equal Access to Services     BRENDA LAO AH: Angely Rivas, krishna buenrostro, tobias sofia, yaw contreras. So [facility] [phone].    ATENCIÓN: Si habla español, tiene a king disposición servicios gratuitos de asistencia lingüística. Marsha al [phone].    We comply with applicable federal civil rights laws and [place] laws. We do not discriminate on the basis of race, color, national origin, age, disability, sex, sexual orientation, or gender identity.               Review of your medicines      CONTINUE these medicines which may have CHANGED, or have new prescriptions. If we are uncertain of the size of tablets/capsules you have at home, strength may be listed as something that might have changed.        Dose / Directions    levothyroxine 25 mcg/mL Susp   Commonly known as:  SYNTHROID   This may have changed:  additional instructions "   Used for:  Panhypopituitarism (H)        Dose:  150 mcg   6 mLs (150 mcg) by Per J Tube route every morning (before breakfast)   Refills:  0         CONTINUE these medicines which have NOT CHANGED        Dose / Directions    ACETAMINOPHEN PO        Dose:  650 mg   650 mg by Per Feeding Tube route every 4 hours as needed for pain   Refills:  0       aspirin 10 mg/mL Susp   Used for:  Routine adult health maintenance        Dose:  81 mg   8.1 mLs (81 mg) by Per J Tube route daily   Refills:  0       bacitracin ointment        Apply topically daily as needed for wound care To PEG site.   Refills:  0       bisacodyl 10 MG Suppository   Commonly known as:  DULCOLAX   Used for:  Constipation, unspecified constipation type        Dose:  10 mg   Place 1 suppository (10 mg) rectally daily as needed for constipation   Quantity:  30 suppository   Refills:  0       BRIVIACT 10 MG/ML solution   Generic drug:  Brivaracetam        Dose:  100 mg   Take 100 mg by mouth 2 times daily   Refills:  0       calcium carbonate 1250 (500 CA) MG/5ML Susp suspension   Used for:  Malnutrition (H)        Dose:  1250 mg   5 mLs (1,250 mg) by Per J Tube route 3 times daily (with meals)   Quantity:  450 mL   Refills:  0       carBAMazepine 100 MG/5ML suspension   Commonly known as:  TEGretol        Dose:  150 mg   Take 150 mg by mouth every 6 hours   Refills:  0       fiber modular packet   Used for:  Necrotizing pancreatitis        Dose:  1 packet   1 packet by Per Feeding Tube route 3 times daily   Refills:  0       * hydrocortisone 2 mg/mL Susp   Commonly known as:  CORTEF   Used for:  Panhypopituitarism (H)        Dose:  20 mg   10 mLs (20 mg) by Per J Tube route every morning   Refills:  0       * hydrocortisone 2 mg/mL Susp   Commonly known as:  CORTEF   Used for:  Panhypopituitarism (H)        Dose:  10 mg   Take 5 mLs (10 mg) by mouth every evening   Refills:  0       melatonin 1 MG/ML Liqd liquid        Dose:  6 mg   6 mg by Jejunal  Tube route nightly as needed for sleep   Refills:  0       miconazole 2 % powder   Commonly known as:  MICATIN; MICRO GUARD   Used for:  Rash        Apply topically every hour as needed for other (topical candidiasis)   Refills:  0       multivitamins with minerals Liqd liquid   Used for:  Necrotizing pancreatitis, Malnutrition (H)        Dose:  15 mL   15 mLs by Per J Tube route daily   Refills:  0       pantoprazole Susp suspension   Commonly known as:  PROTONIX        Dose:  20 mg   Take 20 mg by mouth 2 times daily   Refills:  0       potassium & sodium phosphates 280-160-250 MG Packet   Commonly known as:  NEUTRA-PHOS        Dose:  1 packet   Take 1 packet by mouth 3 times daily 0900, 1500, 2100.   Refills:  0       testosterone cypionate 200 MG/ML injection   Commonly known as:  DEPOTESTOTERONE        Dose:  100 mg   Inject 100 mg into the muscle See Admin Instructions Every three weeks. Unknown due date.   Refills:  0       VITAMIN D (CHOLECALCIFEROL) PO        Dose:  2000 Units   Take 2,000 Units by mouth daily   Refills:  0       * Notice:  This list has 2 medication(s) that are the same as other medications prescribed for you. Read the directions carefully, and ask your doctor or other care provider to review them with you.             Protect others around you: Learn how to safely use, store and throw away your medicines at www.disposemymeds.org.             Medication List: This is a list of all your medications and when to take them. Check marks below indicate your daily home schedule. Keep this list as a reference.      Medications           Morning Afternoon Evening Bedtime As Needed    ACETAMINOPHEN PO   650 mg by Per Feeding Tube route every 4 hours as needed for pain   Last time this was given:  650 mg on 12/28/2017 10:32 AM   Next Dose Due:  As needed                                  aspirin 10 mg/mL Susp   8.1 mLs (81 mg) by Per J Tube route daily   Next Dose Due:  1/1                                 bacitracin ointment   Apply topically daily as needed for wound care To PEG site.   Next Dose Due:  As needed                                  bisacodyl 10 MG Suppository   Commonly known as:  DULCOLAX   Place 1 suppository (10 mg) rectally daily as needed for constipation   Next Dose Due:  As needed                                  BRIVIACT 10 MG/ML solution   Take 100 mg by mouth 2 times daily   Last time this was given:  100 mg on 12/31/2017 10:18 AM   Generic drug:  Brivaracetam   Next Dose Due:  12/31                                   calcium carbonate 1250 (500 CA) MG/5ML Susp suspension   5 mLs (1,250 mg) by Per J Tube route 3 times daily (with meals)   Next Dose Due:  Resume                                  carBAMazepine 100 MG/5ML suspension   Commonly known as:  TEGretol   Take 150 mg by mouth every 6 hours   Last time this was given:  150 mg on 12/31/2017 10:18 AM   Next Dose Due:  12/31 4:30 pm                                 fiber modular packet   1 packet by Per Feeding Tube route 3 times daily   Last time this was given:  1 packet on 12/30/2017 10:40 PM   Next Dose Due:  Resume                                  * hydrocortisone 2 mg/mL Susp   Commonly known as:  CORTEF   10 mLs (20 mg) by Per J Tube route every morning   Last time this was given:  12/31 8 am   Next Dose Due:  1/1                                * hydrocortisone 2 mg/mL Susp   Commonly known as:  CORTEF   Take 5 mLs (10 mg) by mouth every evening   Next Dose Due:  12/31                                   levothyroxine 25 mcg/mL Susp   Commonly known as:  SYNTHROID   6 mLs (150 mcg) by Per J Tube route every morning (before breakfast)   Last time this was given:  12/31 7 am   Next Dose Due:  1/1                                melatonin 1 MG/ML Liqd liquid   6 mg by Jejunal Tube route nightly as needed for sleep   Next Dose Due:  12/31                                miconazole 2 % powder   Commonly known as:  MICATIN; MICRO GUARD   Apply  topically every hour as needed for other (topical candidiasis)   Next Dose Due:  As needed                                  multivitamins with minerals Liqd liquid   15 mLs by Per J Tube route daily   Next Dose Due:  1/1                                pantoprazole Susp suspension   Commonly known as:  PROTONIX   Take 20 mg by mouth 2 times daily   Last time this was given:  20 mg on 12/31/2017  8:19 AM   Next Dose Due:  12/31                                potassium & sodium phosphates 280-160-250 MG Packet   Commonly known as:  NEUTRA-PHOS   Take 1 packet by mouth 3 times daily 0900, 1500, 2100.   Next Dose Due:  12/31  1500                                testosterone cypionate 200 MG/ML injection   Commonly known as:  DEPOTESTOTERONE   Inject 100 mg into the muscle See Admin Instructions Every three weeks. Unknown due date.   Next Dose Due:  Resume                                  VITAMIN D (CHOLECALCIFEROL) PO   Take 2,000 Units by mouth daily   Next Dose Due:  1/1                                * Notice:  This list has 2 medication(s) that are the same as other medications prescribed for you. Read the directions carefully, and ask your doctor or other care provider to review them with you.
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
## 2017-12-26 NOTE — IP AVS SNAPSHOT
[address] [facility]    [address] [place] [address]    Phone:  [phone]                                       After Visit Summary   12/26/2017    Keyon Farias    MRN: [id number]           After Visit Summary Signature Page     I have received my discharge instructions, and my questions have been answered. I have discussed any challenges I see with this plan with the nurse or doctor.    ..........................................................................................................................................  Patient/Patient Representative Signature      ..........................................................................................................................................  Patient Representative Print Name and Relationship to Patient    ..................................................               ................................................  Date                                            Time    ..........................................................................................................................................  Reviewed by Signature/Title    ...................................................              ..............................................  Date                                                            Time

## 2017-12-26 NOTE — ED NOTES
"Pt's mother hit call button multiple times. She is requesting pt be \"repositioned\" which pt was via ERT. She is now requesting a new TV remote; will look once I have a second. REG is now @ pt's side.  "

## 2017-12-26 NOTE — ED NOTES
[facility]  ED Nurse Handoff Report    ED Chief complaint: Cough (sick for 2 days with cough, fever at home 100.8, feeding tube became clogged early this morning, vomited last night); Fever; and Gtube Problem      ED Diagnosis:   Final diagnoses:   None       Code Status: to be addressed upon admission    Allergies:   Allergies   Allergen Reactions     Dilantin [Phenytoin Sodium]      Valproic Acid      Toxicity c bone marrow suspension, elevated ammonia levels        Activity level - Baseline/Home:  Total Care    Activity Level - Current:   Total Care     Needed?: No    Isolation: Yes  Infection: MRSA  VRE    Bariatric?: No    Vital Signs:   Vitals:    12/26/17 1104 12/26/17 1130 12/26/17 1200 12/26/17 1201   BP:  110/64 111/63    Pulse:  118  116   Resp:       Temp:    99.9  F (37.7  C)   TempSrc:    Temporal   SpO2: 96% 95%  93%   Weight:       Height:           Cardiac Rhythm: ,        Pain level:      Is this patient confused?: No    Patient Report: Initial Complaint: Fever, cough, clogged GJ tube  Focused Assessment: Pt has been sick for a couple days. Has had a cough. Did vomit last night and mom is afraid he aspirated. Pt has hx of TBI and strokes and lives with his mother who cares for him with assist of home health. Not currently taking anything by mouth. Thinks feeding tube became clogged early this morning as pump for feeding shuts off. Used clog zapper and obstruction cleared and tube functioning again. Pt does have some wheezing and noted to be tachypneic. Unable to say more than yes or no. Has general ill appearance but improved over arrival now, more awake and alert. Of note, pt has not had his morning doses of seizure meds yet and mom is very concerned he get them asap. Pharmacy verifying meds currently.  Tests Performed: Labs, Chest xray, Blood cultures; repeat lactic acid sent no results yet  Abnormal Results: Lactic 2.3  Treatments provided: 2L NS bolus, Merrem 1 g  IVPB, Cipro 400mg IVPB, Vanco 1500mg IVPB running    Family Comments: mother at bedside    OBS brochure/video discussed/provided to patient: N/A    ED Medications:   Medications   0.9% sodium chloride BOLUS (0 mLs Intravenous Stopped 12/26/17 1154)     Followed by   0.9% sodium chloride BOLUS (1,000 mLs Intravenous New Bag 12/26/17 1155)     Followed by   0.9% sodium chloride infusion (not administered)   ciprofloxacin (CIPRO) infusion 400 mg (400 mg Intravenous New Bag 12/26/17 1156)   vancomycin (VANCOCIN) 1500 mg in 0.9% NaCl 250 mL PREMIX (not administered)   meropenem (MERREM) intermittent 1g in NS PREMIX (1 g Intravenous Given 12/26/17 1121)       Drips infusing?:  No      ED NURSE PHONE NUMBER: [phone]

## 2017-12-26 NOTE — ED NOTES
Pt's mother hit call light and is requesting that we wash pt's head; I told her this will be addressed upstairs once pt is admitted.

## 2017-12-26 NOTE — ED NOTES
Bed: ED30  Expected date:   Expected time:   Means of arrival:   Comments:  Kerri  57 M fever/chills  0913

## 2017-12-27 ENCOUNTER — APPOINTMENT (OUTPATIENT)
Dept: GENERAL RADIOLOGY | Facility: CLINIC | Age: 57
DRG: 178 | End: 2017-12-27
Attending: INTERNAL MEDICINE
Payer: MEDICARE

## 2017-12-27 ENCOUNTER — APPOINTMENT (OUTPATIENT)
Dept: PHYSICAL THERAPY | Facility: CLINIC | Age: 57
DRG: 178 | End: 2017-12-27
Attending: PHYSICIAN ASSISTANT
Payer: MEDICARE

## 2017-12-27 LAB
ANION GAP SERPL CALCULATED.3IONS-SCNC: 4 MMOL/L (ref 3–14)
BACTERIA SPEC CULT: NO GROWTH
BASOPHILS # BLD AUTO: 0.1 10E9/L (ref 0–0.2)
BASOPHILS NFR BLD AUTO: 0.9 %
BUN SERPL-MCNC: 14 MG/DL (ref 7–30)
CALCIUM SERPL-MCNC: 8 MG/DL (ref 8.5–10.1)
CHLORIDE SERPL-SCNC: 110 MMOL/L (ref 94–109)
CO2 SERPL-SCNC: 26 MMOL/L (ref 20–32)
CREAT SERPL-MCNC: 0.94 MG/DL (ref 0.66–1.25)
DIFFERENTIAL METHOD BLD: ABNORMAL
EOSINOPHIL # BLD AUTO: 0.6 10E9/L (ref 0–0.7)
EOSINOPHIL NFR BLD AUTO: 10.7 %
ERYTHROCYTE [DISTWIDTH] IN BLOOD BY AUTOMATED COUNT: 14.5 % (ref 10–15)
GFR SERPL CREATININE-BSD FRML MDRD: 84 ML/MIN/1.7M2
GLUCOSE SERPL-MCNC: 103 MG/DL (ref 70–99)
HCT VFR BLD AUTO: 40.6 % (ref 40–53)
HGB BLD-MCNC: 13.5 G/DL (ref 13.3–17.7)
IMM GRANULOCYTES # BLD: 0 10E9/L (ref 0–0.4)
IMM GRANULOCYTES NFR BLD: 0.2 %
LACTATE BLD-SCNC: 1 MMOL/L (ref 0.7–2)
LYMPHOCYTES # BLD AUTO: 1.8 10E9/L (ref 0.8–5.3)
LYMPHOCYTES NFR BLD AUTO: 31.1 %
Lab: NORMAL
MCH RBC QN AUTO: 30.8 PG (ref 26.5–33)
MCHC RBC AUTO-ENTMCNC: 33.3 G/DL (ref 31.5–36.5)
MCV RBC AUTO: 93 FL (ref 78–100)
MONOCYTES # BLD AUTO: 0.7 10E9/L (ref 0–1.3)
MONOCYTES NFR BLD AUTO: 12.8 %
NEUTROPHILS # BLD AUTO: 2.6 10E9/L (ref 1.6–8.3)
NEUTROPHILS NFR BLD AUTO: 44.3 %
NRBC # BLD AUTO: 0 10*3/UL
NRBC BLD AUTO-RTO: 0 /100
PLATELET # BLD AUTO: 107 10E9/L (ref 150–450)
POTASSIUM SERPL-SCNC: 4 MMOL/L (ref 3.4–5.3)
PROCALCITONIN SERPL-MCNC: 0.05 NG/ML
RBC # BLD AUTO: 4.39 10E12/L (ref 4.4–5.9)
SODIUM SERPL-SCNC: 140 MMOL/L (ref 133–144)
SPECIMEN SOURCE: NORMAL
WBC # BLD AUTO: 5.8 10E9/L (ref 4–11)

## 2017-12-27 PROCEDURE — 25000132 ZZH RX MED GY IP 250 OP 250 PS 637: Mod: GY | Performed by: INTERNAL MEDICINE

## 2017-12-27 PROCEDURE — 97110 THERAPEUTIC EXERCISES: CPT | Mod: GP | Performed by: PHYSICAL THERAPIST

## 2017-12-27 PROCEDURE — 83605 ASSAY OF LACTIC ACID: CPT | Performed by: INTERNAL MEDICINE

## 2017-12-27 PROCEDURE — 97161 PT EVAL LOW COMPLEX 20 MIN: CPT | Mod: GP | Performed by: PHYSICAL THERAPIST

## 2017-12-27 PROCEDURE — 71010 XR CHEST PORT 1 VW: CPT

## 2017-12-27 PROCEDURE — 25000125 ZZHC RX 250: Performed by: INTERNAL MEDICINE

## 2017-12-27 PROCEDURE — 40000275 ZZH STATISTIC RCP TIME EA 10 MIN

## 2017-12-27 PROCEDURE — 25000132 ZZH RX MED GY IP 250 OP 250 PS 637: Mod: GY | Performed by: PHYSICIAN ASSISTANT

## 2017-12-27 PROCEDURE — 94640 AIRWAY INHALATION TREATMENT: CPT | Mod: 76

## 2017-12-27 PROCEDURE — 40000193 ZZH STATISTIC PT WARD VISIT: Performed by: PHYSICAL THERAPIST

## 2017-12-27 PROCEDURE — 25000128 H RX IP 250 OP 636: Performed by: PHYSICIAN ASSISTANT

## 2017-12-27 PROCEDURE — 25000125 ZZHC RX 250: Performed by: PHYSICIAN ASSISTANT

## 2017-12-27 PROCEDURE — A9270 NON-COVERED ITEM OR SERVICE: HCPCS | Mod: GY | Performed by: INTERNAL MEDICINE

## 2017-12-27 PROCEDURE — 36415 COLL VENOUS BLD VENIPUNCTURE: CPT | Performed by: INTERNAL MEDICINE

## 2017-12-27 PROCEDURE — 27210995 ZZH RX 272: Performed by: PHYSICIAN ASSISTANT

## 2017-12-27 PROCEDURE — 85025 COMPLETE CBC W/AUTO DIFF WBC: CPT | Performed by: PHYSICIAN ASSISTANT

## 2017-12-27 PROCEDURE — 12000007 ZZH R&B INTERMEDIATE

## 2017-12-27 PROCEDURE — 84145 PROCALCITONIN (PCT): CPT | Performed by: PHYSICIAN ASSISTANT

## 2017-12-27 PROCEDURE — 25000128 H RX IP 250 OP 636: Performed by: INTERNAL MEDICINE

## 2017-12-27 PROCEDURE — 99233 SBSQ HOSP IP/OBS HIGH 50: CPT | Performed by: INTERNAL MEDICINE

## 2017-12-27 PROCEDURE — A9270 NON-COVERED ITEM OR SERVICE: HCPCS | Mod: GY | Performed by: PHYSICIAN ASSISTANT

## 2017-12-27 PROCEDURE — 80048 BASIC METABOLIC PNL TOTAL CA: CPT | Performed by: PHYSICIAN ASSISTANT

## 2017-12-27 PROCEDURE — 27210429 ZZH NUTRITION PRODUCT INTERMEDIATE LITER

## 2017-12-27 PROCEDURE — 36415 COLL VENOUS BLD VENIPUNCTURE: CPT | Performed by: PHYSICIAN ASSISTANT

## 2017-12-27 RX ORDER — MEROPENEM AND SODIUM CHLORIDE 1 G/50ML
1 INJECTION, SOLUTION INTRAVENOUS EVERY 8 HOURS
Status: DISCONTINUED | OUTPATIENT
Start: 2017-12-27 | End: 2017-12-29

## 2017-12-27 RX ORDER — FUROSEMIDE 10 MG/ML
20 INJECTION INTRAMUSCULAR; INTRAVENOUS
Status: COMPLETED | OUTPATIENT
Start: 2017-12-27 | End: 2017-12-28

## 2017-12-27 RX ORDER — IPRATROPIUM BROMIDE AND ALBUTEROL SULFATE 2.5; .5 MG/3ML; MG/3ML
3 SOLUTION RESPIRATORY (INHALATION)
Status: DISCONTINUED | OUTPATIENT
Start: 2017-12-27 | End: 2017-12-31 | Stop reason: HOSPADM

## 2017-12-27 RX ADMIN — CARBAMAZEPINE 150 MG: 100 SUSPENSION ORAL at 04:26

## 2017-12-27 RX ADMIN — VANCOMYCIN HYDROCHLORIDE 1500 MG: 5 INJECTION, POWDER, LYOPHILIZED, FOR SOLUTION INTRAVENOUS at 14:30

## 2017-12-27 RX ADMIN — HYDROCORTISONE 10 MG: 10 TABLET ORAL at 20:36

## 2017-12-27 RX ADMIN — MEROPENEM AND SODIUM CHLORIDE 1 G: 1 INJECTION, SOLUTION INTRAVENOUS at 20:46

## 2017-12-27 RX ADMIN — PANTOPRAZOLE SODIUM 20 MG: 40 TABLET, DELAYED RELEASE ORAL at 08:21

## 2017-12-27 RX ADMIN — IPRATROPIUM BROMIDE AND ALBUTEROL SULFATE 3 ML: .5; 3 SOLUTION RESPIRATORY (INHALATION) at 23:36

## 2017-12-27 RX ADMIN — IPRATROPIUM BROMIDE AND ALBUTEROL SULFATE 3 ML: .5; 3 SOLUTION RESPIRATORY (INHALATION) at 15:00

## 2017-12-27 RX ADMIN — SODIUM CHLORIDE: 9 INJECTION, SOLUTION INTRAVENOUS at 06:56

## 2017-12-27 RX ADMIN — ASPIRIN 81 MG 81 MG: 81 TABLET ORAL at 08:20

## 2017-12-27 RX ADMIN — Medication 1 PACKET: at 15:32

## 2017-12-27 RX ADMIN — FUROSEMIDE 20 MG: 10 INJECTION, SOLUTION INTRAVENOUS at 15:32

## 2017-12-27 RX ADMIN — Medication 1 PACKET: at 09:55

## 2017-12-27 RX ADMIN — PANTOPRAZOLE SODIUM 20 MG: 40 TABLET, DELAYED RELEASE ORAL at 20:36

## 2017-12-27 RX ADMIN — ONDANSETRON 4 MG: 2 INJECTION INTRAMUSCULAR; INTRAVENOUS at 08:41

## 2017-12-27 RX ADMIN — HYDROCORTISONE 20 MG: 10 TABLET ORAL at 08:21

## 2017-12-27 RX ADMIN — IPRATROPIUM BROMIDE AND ALBUTEROL SULFATE 3 ML: .5; 3 SOLUTION RESPIRATORY (INHALATION) at 20:04

## 2017-12-27 RX ADMIN — MEROPENEM AND SODIUM CHLORIDE 1 G: 1 INJECTION, SOLUTION INTRAVENOUS at 13:29

## 2017-12-27 RX ADMIN — CARBAMAZEPINE 150 MG: 100 SUSPENSION ORAL at 21:30

## 2017-12-27 RX ADMIN — BRIVARACETAM 100 MG: 50 INJECTION, SUSPENSION INTRAVENOUS at 08:46

## 2017-12-27 RX ADMIN — Medication 1 PACKET: at 21:29

## 2017-12-27 RX ADMIN — CARBAMAZEPINE 150 MG: 100 SUSPENSION ORAL at 15:32

## 2017-12-27 RX ADMIN — ACETAMINOPHEN 650 MG: 325 TABLET, FILM COATED ORAL at 13:37

## 2017-12-27 RX ADMIN — LEVOTHYROXINE SODIUM 150 MCG: 150 TABLET ORAL at 06:56

## 2017-12-27 RX ADMIN — ACETAMINOPHEN 650 MG: 325 TABLET, FILM COATED ORAL at 21:28

## 2017-12-27 RX ADMIN — BRIVARACETAM 100 MG: 10 SOLUTION ORAL at 21:29

## 2017-12-27 RX ADMIN — CARBAMAZEPINE 150 MG: 100 SUSPENSION ORAL at 09:55

## 2017-12-27 NOTE — PLAN OF CARE
Problem: Patient Care Overview  Goal: Plan of Care/Patient Progress Review  Outcome: No Change  Unable to assess orientation status but per family patient is fully oriented. VSS, HR borderline tachycardic. Pt desatted to 88% overnight, placed on  2L O2 satting 92-93%. R sided hemiplegia, expressive aphasia. Meds through g tube, total cares. Incontinent of urine x2 overnight. Pt had one episode of small emesis at start of shift. Denied pain. Plan pending, continue to monitor.

## 2017-12-27 NOTE — PLAN OF CARE
Tachycardic, slightly febrile, hemiplegia, expressive aphasia, A + O x ?, knows his name and reacts to you. Mom says he is cognizant. Acetaminophen given as 99.9 and felt warm. Meds through tube, full cares. Incontinent of urine x3 this shift.

## 2017-12-27 NOTE — PROGRESS NOTES
[facility]    Hospitalist Progress Note    Date of Service (when I saw the patient): 12/27/2017    Assessment & Plan   Keyon Farias is a 57-year-old gentleman with past medical history of TBI with right spastic hemiplegia, aphasia, dysphagia, status G-tube placement, seizure disorder, panhypopituitarism and history of recurrent aspiration pneumonia as well as necrotizing pancreatitis who presents to the Emergency Department after an episode of vomiting last night with subsequent development of fever and cough with concern for aspiration pneumonia.  T-max 100 degrees Fahrenheit with a tachycardia of 113 beats per minute, all other vitals within normal limits.  The patient is currently stable.      Aspiration pneumonitis Vs developing PNA   Patient with 1 transient episode of vomiting the evening PTA with subsequent development of a fever, T-max of 100.8 degrees Fahrenheit and cough.  History of aspiration pneumonia with pseudomonas lung colonization (Sens to Cipro and Meropenem).  Last hospitalization for this was in 10/2017.  WBC within normal limits.  Procalcitonin 0.05.  Chest x-ray shows right basilar atelectasis.  CMP, UA, influenza negative.  Lactic acid was initially 2.3 but improved to 2.0 after IV fluid hydration resuscitation.  The patient was given a dose of Cipro, meropenem and vancomycin consistent with prior treatment for aspiration pneumonia during last hospitalization in the ER.   -- Given initial procalcitonin 0.05, no fever documented, No leukocytosis and no clear evidence of bacterial infection his Abx were not continued on admit.  -- Repeat Procal unchanged but he has spike a fever of 102.3 12/27 and is noted on my exam to be hypoxic at 87% on RA, tachycardic, with increased work of breathing and coarse BS. Rn reports and episode of vomiting this am and inability to clear secretions. He was also noted to be lying essentially flat on my exam. I suspect he may have aspirated this am.     -- Will hold his tube feeds. HOB > 35 degrees.   -- CXR stat.   -- Will initiate broad abx coverage with Vanco and Meropenem. Follow Sputum Cx.     -- Blood cultures NGTD  -- UA without signs of infection.  -- Duonebs       Traumatic brain injury with aphasia, dysphasia, status post G-tube placement and right spastic hemiplegia:    Patient initially presented with a clogged G-tube, but this was resolved after ED treatment.   -- Nutrition consult for nocturnal feeding.   -- TF on hold as of 12/27 for suspected aspiration event.      Seizure disorder:    -- Thought due to TBI.  Continue PTA Tegretol.      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  Continue PTA hydrocortisone and Synthroid.    -- BP noted to be on the lower side this afternoon (99/52). If no improvement seen will give him a stress dose of steroids.     History of necrotizing pancreatitis:    Hospitalized in 2/2017.  The patient had a cystogastrostomy tube placed at that time with cultures growing highly resistant pseudomonas and vancomycin resistant enterococcus.  This has been stable.   -- Lipase normal and with a normal abd exam.     Thrombocytopenia  -- Plt's 155 on admit and 107 12/27. Have been low in the past as well during times of infection.   Likely diluted and consumption related to the above.   -- Will follow cbc in am.        DVT Prophylaxis: Pneumatic Compression Devices. Consider Hep Sq if Plt's improve.   Code Status: Full Code    Disposition:  CXR now. Start empiric broad spectrum Abx and hold his TF. Check for residuals.  I suspect he aspirated this morning. He may have aspirated slightly PTA but this was a more significant event.  Follow Resp status closely. Will need to consider stress dose steroids if BP's do not improve.           Addendum:  CXR with Pulm vascular congestion. IVF Stopped. Will start iv lasix 20 mg bid x 1 day and reassess tomorrow. Lerma placed for output monitoring. Chart reviewed and EF in 11/2016 was  "30-35% and felt to be related to stress cardiomyopathy. No repeat echo completed since. Echo ordered. Still feel he may have slightly aspirated given his fever. Will continue to follow and consider adjusting Abx/tpering off as coarse progresses.       Israel Christianson       Interval History   Afebrile overnight but did spike to 102.3 this morning. Also had an episode of vomiting per RN. Noted with increased work of breathing since and coarse BS with hypoxia. Makes eye contact but does not speak due to TBI. Moves his hand in a \" so so\" type of manner when asked how he is doing.         -Data reviewed today: I reviewed all new labs and imaging results over the last 24 hours. I personally reviewed no images or EKG's today.    Physical Exam   Temp: 102.3  F (39.1  C) Temp src: Axillary BP: 99/52 Pulse: 100   Resp: 20 SpO2: 90 % O2 Device: None (Room air) Oxygen Delivery: 2 LPM  Vitals:    12/26/17 0935 12/27/17 0648   Weight: 77.1 kg (170 lb) 78 kg (172 lb)     Vital Signs with Ranges  Temp:  [98.8  F (37.1  C)-102.3  F (39.1  C)] 102.3  F (39.1  C)  Pulse:  [] 100  Resp:  [20-32] 20  BP: ()/() 99/52  SpO2:  [88 %-95 %] 90 %       Gen: Patient in mild resp distress with increased work of breathing and saturating at 87% on RA. Coarse cough with secretions noted in upper airway.   Heart:  S1S2+, regular rhythm, tachycardia, No murmurs.  Lungs:  Very coarse BS b/l, faint exp wheeze. Increased work of breathing. + rales.   Abdomen:  Soft, non tender, non distended, bowel sounds positive. G-tube in place without signs of local infection.   Extremities:  No edema. Warm extremities.     Medications     IV fluid REPLACEMENT ONLY       NaCl 100 mL/hr at 12/27/17 0656       aspirin  81 mg Per J Tube Daily     carBAMazepine  150 mg Oral Q6H     fiber modular  1 packet Per Feeding Tube TID     levothyroxine  150 mcg Per J Tube QAM AC     pantoprazole  20 mg Oral BID     hydrocortisone  10 mg Oral QPM     " hydrocortisone  20 mg Oral QAM     Brivaracetam  100 mg Oral BID       Data     Recent Labs  Lab 12/27/17  0643 12/26/17  0930   WBC 5.8 10.2   HGB 13.5 16.2   MCV 93 92   * 155    140   POTASSIUM 4.0 3.8   CHLORIDE 110* 102   CO2 26 32   BUN 14 18   CR 0.94 1.09   ANIONGAP 4 6   STEVE 8.0* 9.4   * 86   ALBUMIN  --  3.6   PROTTOTAL  --  8.7   BILITOTAL  --  0.5   ALKPHOS  --  98   ALT  --  35   AST  --  26   LIPASE  --  363       No results found for this or any previous visit (from the past 24 hour(s)).

## 2017-12-27 NOTE — PROGRESS NOTES
Cross Cover Note    Spoke with pharmacist.  Briviact solution not currently available. Will give IV tonight and tomorrow am until solution is available.    Marivel Douglas PA-C

## 2017-12-27 NOTE — PHARMACY-VANCOMYCIN DOSING SERVICE
Pharmacy Vancomycin Initial Note  Date of Service 2017  Patient's  1960  57 year old, male    Indication: Aspiration Pneumonia    Current estimated CrCl = Estimated Creatinine Clearance: 98 mL/min (based on Cr of 0.94).    Creatinine for last 3 days  2017:  9:30 AM Creatinine 1.09 mg/dL  2017:  6:43 AM Creatinine 0.94 mg/dL    Recent Vancomycin Level(s) for last 3 days  No results found for requested labs within last 72 hours.      Vancomycin IV Administrations (past 72 hours)                   vancomycin (VANCOCIN) 1500 mg in 0.9% NaCl 250 mL PREMIX (mg) 1,500 mg New Bag 17 1321                Nephrotoxins and other renal medications (Future)    Start     Dose/Rate Route Frequency Ordered Stop    17 1400  vancomycin (VANCOCIN) 1500 mg in 0.9% NaCl 250 mL PREMIX      1,500 mg  166.7 mL/hr over 90 Minutes Intravenous EVERY 18 HOURS 17 1318            Contrast Orders - past 72 hours     None                Plan:  1.  Start vancomycin  1500 mg IV q18h.   2.  Goal Trough Level: 15-20 mg/L   3.  Pharmacy will check trough levels as appropriate in 1-3 Days.    4. Serum creatinine levels will be ordered daily for the first week of therapy and at least twice weekly for subsequent weeks.    5. Trimble method utilized to dose vancomycin therapy: Method 2    Mayela Catherine, PharmD

## 2017-12-27 NOTE — PROGRESS NOTES
"   12/27/17 1030   Quick Adds   Type of Visit Initial PT Evaluation   Living Environment   Lives With parent(s)   Living Arrangements house   Home Accessibility no concerns   Living Environment Comment Per chart review pt lives in handicapped acessible home, no family present for home environment information.   Self-Care   Usual Activity Tolerance fair   Current Activity Tolerance fair   Regular Exercise yes   Activity/Exercise Type (per chart was receiving HHPT/OT/SLP)   Activity/Exercise/Self-Care Comment Per chart review from evaluation in October 2017, pt requires max A x 1-2 for stand pivot transfers to w/c at baseline. Has 11.5 hours of PCA cares and assist for ADLs. Was working with HH therapies to progress indep with transfers and mobility as approx a year ago was able to stand pivot with mother's help and self propel w/c with LUE/LE.    Functional Level Prior   Ambulation 4-->completely dependent   Transferring 3-->assistive equipment and person   Toileting 3-->assistive equipment and person   Bathing 3-->assistive equipment and person   Communication 2-->difficulty speaking (not related to language barrier)   Swallowing 2-->difficulty swallowing liquids/foods   Cognition 2 - difficulty with organizing thoughts   Fall history within last six months (unable to assess, no caregiver present)   Which of the above functional risks had a recent onset or change? none  (close to baseline with requiring A x 2 for mobility/transfer)   General Information   Onset of Illness/Injury or Date of Surgery - Date 12/26/17   Referring Physician Lakia Kellogg, PA-C   Patient/Family Goals Statement None stated   Pertinent History of Current Problem (include personal factors and/or comorbidities that impact the POC) Per chart: \" 57-year-old gentleman with past medical history of TBI with right spastic hemiplegia, aphasia, dysphagia with G-tube placement, seizure disorder, panhypopituitarism, GERD and history of " "necrotizing pancreatitis who presented to the Emergency Department today after an episode of vomiting last evening with subsequent fever and cough with mother's concern for aspiration pneumonia given history.  The patient was last hospitalized at [facility] from 10/04 to 10/07 for sepsis secondary to right middle and lower lobe pneumonia.  Infectious Disease followed with him as he has a complicated history of pseudomonas lung colonization as well as necrotizing pancreatitis with resistant organisms including a highly resistant pseudomonas and vancomycin resistant enterococcus. \"   Precautions/Limitations fall precautions   General Info Comments Activity: up with assist   Cognitive Status Examination   Orientation (unable to assess secondary to expressive aphasia)   Level of Consciousness alert   Follows Commands and Answers Questions able to follow single-step instructions;75% of the time   Pain Assessment   Patient Currently in Pain No   Integumentary/Edema   Integumentary/Edema Comments no edema observed in LEs   Posture    Posture Forward head position   Range of Motion (ROM)   ROM Comment Demos B LE contractures with DF on R 10-15 degrees from neutral and DF on L 5 degrees from neutral, B HS tightness with SLR   Strength   Strength Comments R LE hemiplegia, no active contraction observed with attempts for AAROM of RLE; weakness observed in L hip flexion with less then antigravity strength, and 3+/5 in hip abduction/adduction, 4-/5 in L knee extension   Bed Mobility   Bed Mobility Comments max A x 1 for repositioning in supine and roll to R with L UE pull on bed rail   Transfer Skills   Transfer Comments N/A   Gait   Gait Comments N/A   Balance   Balance Comments N/A   Sensory Examination   Sensory Perception Comments unable to assess secondary to expressive aphasia   General Therapy Interventions   Planned Therapy Interventions bed mobility training;ROM;strengthening;stretching;transfer training   Clinical " "Impression   Criteria for Skilled Therapeutic Intervention yes, treatment indicated   PT Diagnosis Difficulty with functional transfers and deconditioning   Influenced by the following impairments contractures, weakness, expressive aphasia, balance impairments   Functional limitations due to impairments Difficulty with functional transfers and decreased tolerance to functional activities   Clinical Presentation Stable/Uncomplicated   Clinical Presentation Rationale see above   Clinical Decision Making (Complexity) Low complexity   Therapy Frequency` 3 times/week   Predicted Duration of Therapy Intervention (days/wks) 5 days   Anticipated Discharge Disposition Home with Assist;Home with Home Therapy   Risk & Benefits of therapy have been explained Yes   Patient, Family & other staff in agreement with plan of care Yes   Clinical Impression Comments Pt likely able to discharge home as has caregivers 11.5 hours a day who are familiar with his transfer techniques/baseline mobility, pending further discussion with family when present.   [facility] [employer] TM \"6 Clicks\"   2016, Trustees of [facility], under license to [employer].  All rights reserved.   6 Clicks Short Forms Basic Mobility Inpatient Short Form   [facility]  \"6 Clicks\" V.2 Basic Mobility Inpatient Short Form   1. Turning from your back to your side while in a flat bed without using bedrails? 2 - A Lot   2. Moving from lying on your back to sitting on the side of a flat bed without using bedrails? 2 - A Lot   3. Moving to and from a bed to a chair (including a wheelchair)? 1 - Total   4. Standing up from a chair using your arms (e.g., wheelchair, or bedside chair)? 1 - Total   5. To walk in hospital room? 1 - Total   6. Climbing 3-5 steps with a railing? 1 - Total   Basic Mobility Raw Score (Score out of 24.Lower scores equate to lower levels of function) 8   Total Evaluation Time   Total Evaluation Time (Minutes) 10     "

## 2017-12-27 NOTE — PLAN OF CARE
Problem: Patient Care Overview  Goal: Plan of Care/Patient Progress Review  PT: PT eval completed, assessment limited as no family present to provide PLOF and pt with expressive aphasia and unable to provide info on baseline mobility. Per chart review pt receives 11.5 hrs PCA services, HHPT/OT/SLP, mother is primary caregiver and have handicapped accessible home. However, no family present to confirm or deny home set up or PLOF at eval this am.    Discharge Planner PT   Patient plan for discharge: none stated  Current status: pt demos R hemiplegia, no active participation in AAROM of R LE this am. LLE demos proximal weakness requiring max A for SLR, min A for heel slide and hip abduction/adduction. Demos B LE contractures with DF on R 10-15 degrees from neutral and DF on L 5 degrees from neutral, B HS tightness with SLR. A x 2 for bed mobility. Holding on OOB assessment until able to determine baseline function from family, recommend use of ceiling lift and  A x2 for OOB with nursing at this time.  Barriers to return to prior living situation: Level of assist for mobility, weakness and LE contractures, unsure of baseline mobility/PLOF and home set up  Recommendations for discharge: pending further mobility assessment and PLOF report from family   Rationale for recommendations: anticipate pt would require continued PT/OT/SLP at discharge, per chart was receiving HH services and may be appropriate to discharge home with HH resumption if lift available/caregivers able to provide level of assist for mobility, otherwise would recommend TCU for daily therapies to progress functional strength and ROM for decreased caregiver burden prior to return home.         Entered by: Gely Banks 12/27/2017 11:23 AM

## 2017-12-27 NOTE — CONSULTS
CLINICAL NUTRITION SERVICES  -  ASSESSMENT NOTE      Recommendations Ordered by Registered Dietitian (RD):    Isosource 1.5 @ 60 mL/hr (hold 1 hr before and after Synthroid administration):   1980 jamel, 90 gm pro (1.2 gm/kg), 20 gm fiber 1000 mL free water  Nutrisource Fiber TID: 45 jamel, 9 gm fiber  TOTAL:  2025 jamel (26 jamel/kg), 29 gm fiber  Free Water Flush: 60 mL q 4 hours.      Future/Additional Recommendations:   Once off IVF, rec increase H2O flushes to 160 mL q 4 hr.     Malnutrition:   Patient does not meet two of the above criteria necessary for diagnosing malnutrition        REASON FOR ASSESSMENT  Keyon Farias is a 57 year old male seen by Registered Dietitian for Provider Order - manage GJ tube feeding      NUTRITION HISTORY  - Information obtained from EMR and pt's mother, Savannah.  Pt lives at home with his mother, he has 3-4 home care RNs that assist with care.  Pt has been on TF since August 2016. He had a G-tube placed 9/7/16 but was later converted to a GJ tube on 12/2/16.  The GJ tube was most recently changed on 11/25/17.    Savannah states that at home they have been changing over from a noc feeding to mostly a daytime feeding.  He gets Jevity 1.5, total of 5.5 cans/day. He starts at 10 AM and runs at 100 mL/hr, providing 1955 jamel, 83 gm protein.  He also gets Nutrisource Fiber TID which provides add'l 45 jamel and 9 gm fiber daily.  Savannah is unsure how much H2O flushes the nurses give.    Savannah reports pt has a good bowel movement every day.    In the past pt was eating small amounts for pleasure but has recently been NPO and SLP has been working with him.    Home meds include the following: Ca, Vit D, Neutra Phos TID, Certavite and Synthroid.  TF is held I hr before and 1 hr after Synthroid administration because that is what was done when pt was at [facility].      CURRENT NUTRITION ORDERS  Diet Order:  None      PHYSICAL FINDINGS  Observed  No nutrition-related physical findings  "observed  Obtained from Chart/Interdisciplinary Team  None noted    ANTHROPOMETRICS  Height: 5' 11\" - There is some question of pt's actual ht: 5' 5\" vs 6'. Will ask nursing to measure him if possible.  Weight: 172 lbs 0 oz (78 kg)  Body mass index is 23.99 kg/(m^2).  Weight Status:  Normal BMI  IBW: 78.2 kg+/- 10%  % IBW: 100%  Weight History:   Weight has been trending up. His current wt is within IBW range if his ht of 5' 11\" is accurate.  Wt Readings from Last 10 Encounters:   12/27/17 78 kg (172 lb)   10/07/17 77.3 kg (170 lb 6.7 oz)   07/27/17 73.9 kg (163 lb)   06/15/17 70.3 kg (155 lb)   06/07/17 70.7 kg (155 lb 12.8 oz)   05/15/17 74.8 kg (165 lb)   02/09/17 73.3 kg (161 lb 8 oz)   01/23/17 74 kg (163 lb 2.3 oz)   12/09/16 79.5 kg (175 lb 4.3 oz)   10/31/16 72.7 kg (160 lb 3 oz)       LABS  Alb 3.6    MEDICATIONS  NS IVF @ 100 mL/hr  Synthroid - hold TF 1 hr before and 1 hr after      Dosing Weight 78 kg - current wt    ASSESSED NUTRITION NEEDS PER APPROVED PRACTICE GUIDELINES:  Estimated Energy Needs: 0737-5951 kcals (25-30 Kcal/Kg)  Justification: maintenance  Estimated Protein Needs:  grams protein (1.2-1.4 g pro/Kg)  Justification: preservation of lean body mass  Estimated Fluid Needs: 0329-0455  mL (1 mL/Kcal)  Justification: maintenance    MALNUTRITION:  % Weight Loss:  None noted  % Intake:  No decreased intake noted  Subcutaneous Fat Loss:  None observed  Muscle Loss:  None observed  Fluid Retention:  None noted    Malnutrition Diagnosis: Patient does not meet two of the above criteria necessary for diagnosing malnutrition      NUTRITION DIAGNOSIS:  No nutrition diagnosis identified at this time       NUTRITION INTERVENTIONS  Recommendations / Nutrition Prescription    Nutrition Support Enteral:  Type of Feeding Tube: GJ tube   Enteral Frequency:  Continuous  Enteral Regimen: Isosource 1.5 @ 60 mL/hr (hold 1 hr before and after Synthroid administration)  Total Enteral Provisions: 1980 jamel, 90 " gm pro (1.2 gm/kg), 20 gm fiber 1000 mL free water  Nutrisource Fiber TID: 45 jamel, 9 gm fiber  TOTAL:  2025 jamel (26 jamel/kg), 29 gm fiber  Free Water Flush: 60 mL q 4 hours. Once off IVF, rec increase to 160 mL q 4 hr.  .      Implementation  Nutrition education: No education needs at this time  EN Schedule - ordered the above TF  Collaboration and Referral of Nutrition care - discussed holding Synthroid with pharmacy      Nutrition Goals  Isosource 1.5  at goal of 60 mL/hr (x22 hr/day) will meet assessed needs      MONITORING AND EVALUATION:  Progress towards goals will be monitored and evaluated per protocol and Practice Guidelines    Unique Schultz RD  Pager [phone] (M-F)            [phone] (W/E & Hol)

## 2017-12-28 ENCOUNTER — APPOINTMENT (OUTPATIENT)
Dept: CARDIOLOGY | Facility: CLINIC | Age: 57
DRG: 178 | End: 2017-12-28
Attending: INTERNAL MEDICINE
Payer: MEDICARE

## 2017-12-28 ENCOUNTER — APPOINTMENT (OUTPATIENT)
Dept: GENERAL RADIOLOGY | Facility: CLINIC | Age: 57
DRG: 178 | End: 2017-12-28
Attending: INTERNAL MEDICINE
Payer: MEDICARE

## 2017-12-28 LAB
CREAT SERPL-MCNC: 1.11 MG/DL (ref 0.66–1.25)
ERYTHROCYTE [DISTWIDTH] IN BLOOD BY AUTOMATED COUNT: 14.4 % (ref 10–15)
FLUAV+FLUBV RNA SPEC QL NAA+PROBE: NEGATIVE
FLUAV+FLUBV RNA SPEC QL NAA+PROBE: NEGATIVE
GFR SERPL CREATININE-BSD FRML MDRD: 69 ML/MIN/1.7M2
HCT VFR BLD AUTO: 39.8 % (ref 40–53)
HGB BLD-MCNC: 13.3 G/DL (ref 13.3–17.7)
MCH RBC QN AUTO: 31 PG (ref 26.5–33)
MCHC RBC AUTO-ENTMCNC: 33.4 G/DL (ref 31.5–36.5)
MCV RBC AUTO: 93 FL (ref 78–100)
PLATELET # BLD AUTO: 114 10E9/L (ref 150–450)
RBC # BLD AUTO: 4.29 10E12/L (ref 4.4–5.9)
RSV RNA SPEC NAA+PROBE: POSITIVE
SPECIMEN SOURCE: ABNORMAL
WBC # BLD AUTO: 5.3 10E9/L (ref 4–11)

## 2017-12-28 PROCEDURE — 40000275 ZZH STATISTIC RCP TIME EA 10 MIN

## 2017-12-28 PROCEDURE — 25000132 ZZH RX MED GY IP 250 OP 250 PS 637: Mod: GY | Performed by: PHYSICIAN ASSISTANT

## 2017-12-28 PROCEDURE — 36415 COLL VENOUS BLD VENIPUNCTURE: CPT | Performed by: INTERNAL MEDICINE

## 2017-12-28 PROCEDURE — 25000132 ZZH RX MED GY IP 250 OP 250 PS 637: Mod: GY | Performed by: INTERNAL MEDICINE

## 2017-12-28 PROCEDURE — 25000128 H RX IP 250 OP 636: Performed by: INTERNAL MEDICINE

## 2017-12-28 PROCEDURE — A9270 NON-COVERED ITEM OR SERVICE: HCPCS | Mod: GY | Performed by: INTERNAL MEDICINE

## 2017-12-28 PROCEDURE — 99233 SBSQ HOSP IP/OBS HIGH 50: CPT | Performed by: INTERNAL MEDICINE

## 2017-12-28 PROCEDURE — 27210429 ZZH NUTRITION PRODUCT INTERMEDIATE LITER

## 2017-12-28 PROCEDURE — 85027 COMPLETE CBC AUTOMATED: CPT | Performed by: INTERNAL MEDICINE

## 2017-12-28 PROCEDURE — 82565 ASSAY OF CREATININE: CPT | Performed by: INTERNAL MEDICINE

## 2017-12-28 PROCEDURE — 25500064 ZZH RX 255 OP 636: Performed by: INTERNAL MEDICINE

## 2017-12-28 PROCEDURE — 94640 AIRWAY INHALATION TREATMENT: CPT | Mod: 76

## 2017-12-28 PROCEDURE — 40000894 ZZH STATISTIC OT IP EVAL DEFER

## 2017-12-28 PROCEDURE — 87631 RESP VIRUS 3-5 TARGETS: CPT | Performed by: INTERNAL MEDICINE

## 2017-12-28 PROCEDURE — 93306 TTE W/DOPPLER COMPLETE: CPT | Mod: 26 | Performed by: INTERNAL MEDICINE

## 2017-12-28 PROCEDURE — 94640 AIRWAY INHALATION TREATMENT: CPT

## 2017-12-28 PROCEDURE — 40000264 ECHO COMPLETE WITH OPTISON

## 2017-12-28 PROCEDURE — 12000007 ZZH R&B INTERMEDIATE

## 2017-12-28 PROCEDURE — A9270 NON-COVERED ITEM OR SERVICE: HCPCS | Mod: GY | Performed by: PHYSICIAN ASSISTANT

## 2017-12-28 PROCEDURE — 27210995 ZZH RX 272: Performed by: PHYSICIAN ASSISTANT

## 2017-12-28 PROCEDURE — 25000125 ZZHC RX 250: Performed by: INTERNAL MEDICINE

## 2017-12-28 PROCEDURE — 71010 XR CHEST PORT 1 VW: CPT

## 2017-12-28 RX ADMIN — CARBAMAZEPINE 150 MG: 100 SUSPENSION ORAL at 05:26

## 2017-12-28 RX ADMIN — Medication 1 PACKET: at 21:59

## 2017-12-28 RX ADMIN — VANCOMYCIN HYDROCHLORIDE 1500 MG: 5 INJECTION, POWDER, LYOPHILIZED, FOR SOLUTION INTRAVENOUS at 08:42

## 2017-12-28 RX ADMIN — IPRATROPIUM BROMIDE AND ALBUTEROL SULFATE 3 ML: .5; 3 SOLUTION RESPIRATORY (INHALATION) at 11:57

## 2017-12-28 RX ADMIN — CARBAMAZEPINE 150 MG: 100 SUSPENSION ORAL at 10:32

## 2017-12-28 RX ADMIN — HYDROCORTISONE 10 MG: 10 TABLET ORAL at 21:58

## 2017-12-28 RX ADMIN — Medication 1 PACKET: at 08:42

## 2017-12-28 RX ADMIN — IPRATROPIUM BROMIDE AND ALBUTEROL SULFATE 3 ML: .5; 3 SOLUTION RESPIRATORY (INHALATION) at 07:33

## 2017-12-28 RX ADMIN — PANTOPRAZOLE SODIUM 20 MG: 40 TABLET, DELAYED RELEASE ORAL at 08:42

## 2017-12-28 RX ADMIN — PANTOPRAZOLE SODIUM 20 MG: 40 TABLET, DELAYED RELEASE ORAL at 21:59

## 2017-12-28 RX ADMIN — HYDROCORTISONE 20 MG: 10 TABLET ORAL at 08:42

## 2017-12-28 RX ADMIN — MEROPENEM AND SODIUM CHLORIDE 1 G: 1 INJECTION, SOLUTION INTRAVENOUS at 05:30

## 2017-12-28 RX ADMIN — IPRATROPIUM BROMIDE AND ALBUTEROL SULFATE 3 ML: .5; 3 SOLUTION RESPIRATORY (INHALATION) at 23:35

## 2017-12-28 RX ADMIN — BRIVARACETAM 100 MG: 10 SOLUTION ORAL at 21:59

## 2017-12-28 RX ADMIN — HUMAN ALBUMIN MICROSPHERES AND PERFLUTREN 2 ML: 10; .22 INJECTION, SOLUTION INTRAVENOUS at 09:55

## 2017-12-28 RX ADMIN — CARBAMAZEPINE 150 MG: 100 SUSPENSION ORAL at 21:59

## 2017-12-28 RX ADMIN — LEVOTHYROXINE SODIUM 150 MCG: 150 TABLET ORAL at 06:21

## 2017-12-28 RX ADMIN — MEROPENEM AND SODIUM CHLORIDE 1 G: 1 INJECTION, SOLUTION INTRAVENOUS at 22:00

## 2017-12-28 RX ADMIN — CARBAMAZEPINE 150 MG: 100 SUSPENSION ORAL at 15:48

## 2017-12-28 RX ADMIN — ACETAMINOPHEN 650 MG: 325 TABLET, FILM COATED ORAL at 10:32

## 2017-12-28 RX ADMIN — BRIVARACETAM 100 MG: 10 SOLUTION ORAL at 09:00

## 2017-12-28 RX ADMIN — ASPIRIN 81 MG 81 MG: 81 TABLET ORAL at 08:42

## 2017-12-28 RX ADMIN — FUROSEMIDE 20 MG: 10 INJECTION, SOLUTION INTRAVENOUS at 08:41

## 2017-12-28 RX ADMIN — MEROPENEM AND SODIUM CHLORIDE 1 G: 1 INJECTION, SOLUTION INTRAVENOUS at 13:35

## 2017-12-28 RX ADMIN — IPRATROPIUM BROMIDE AND ALBUTEROL SULFATE 3 ML: .5; 3 SOLUTION RESPIRATORY (INHALATION) at 15:51

## 2017-12-28 RX ADMIN — Medication 1 PACKET: at 15:48

## 2017-12-28 RX ADMIN — IPRATROPIUM BROMIDE AND ALBUTEROL SULFATE 3 ML: .5; 3 SOLUTION RESPIRATORY (INHALATION) at 20:15

## 2017-12-28 NOTE — PROGRESS NOTES
[facility]    Hospitalist Progress Note    Assessment & Plan   Keyon Farias is a 57 year old male who was admitted on 12/26/2017.         Keyon Farias is a 57-year-old gentleman with past medical history of TBI with right spastic hemiplegia, aphasia, dysphagia, status G-tube placement, seizure disorder, panhypopituitarism and history of recurrent aspiration pneumonia as well as necrotizing pancreatitis who presented to the Emergency Department after an episode of vomiting nith subsequent development of fever and cough with concern for aspiration pneumonia.  T-max 100 degrees Fahrenheit with a tachycardia of 113 beats per minute, all other vitals within normal limits.       Aspiration pneumonitis Vs developing PNA   Patient with 1 transient episode of vomiting the evening PTA with subsequent development of a fever, T-max of 100.8 degrees Fahrenheit and cough.  History of aspiration pneumonia with pseudomonas lung colonization (Sens to Cipro and Meropenem).  Last hospitalization for this was in 10/2017.  WBC within normal limits.  Procalcitonin 0.05.  Chest x-ray shows right basilar atelectasis.  CMP, UA, influenza negative.  Lactic acid was initially 2.3 but improved to 2.0 after IV fluid hydration resuscitation.  The patient was given a dose of Cipro, meropenem and vancomycin consistent with prior treatment for aspiration pneumonia during last hospitalization in the ER.   -- Given initial procalcitonin 0.05, no fever documented, No leukocytosis and no clear evidence of bacterial infection his Abx were not continued on admit.  -- Repeat Procal unchanged but he has spike a fever of 102.3 12/27,  exam  hypoxic at 87% on RA, tachycardic, with increased work of breathing and coarse BS. Rn reports and episode of vomiting 12/27 am and inability to clear secretions, may have aspirated this am 12/27    -- continue HOB > 35 degrees.   -tube feeds held yesterday, would like to restart at 10-15 cc/hr, will have  nutrition see   -- CXR 12/27  With pulm vasc congestion and mild pulm edema  -received 2 doses of iv lasix 20 mg   -- weight is down 75.8<78<77.1  -- 12/27  initiated broad abx coverage with Vanco and Meropenem. Follow Sputum Cx.     -- Blood cultures NGTD  -- UA without signs of infection.  -- Duonebs    -plan to repeat CXR today, hold on further lasix for now until CXR reviewed  -echo pending  -continue daily weights     Traumatic brain injury with aphasia, dysphasia, status post G-tube placement and right spastic hemiplegia:    Patient initially presented with a clogged G-tube, but this was resolved after ED treatment.   -- TF on hold as of 12/27 for suspected aspiration event, would like to restart at 10-15 cc/hr and monitor      Seizure disorder:    -- Thought due to TBI.  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  Continue PTA hydrocortisone and Synthroid.    -- if BP low will start stress dose of steroids but BP ok  For now      History of necrotizing pancreatitis:    Hospitalized in 2/2017.  The patient had a cystogastrostomy tube placed at that time with cultures growing highly resistant pseudomonas and vancomycin resistant enterococcus.  This has been stable.   -- Lipase normal and with a normal abd exam.      Thrombocytopenia  -- Plt's 155 on admit and 107 12/27. Have been low in the past as well during times of infection.   Likely diluted and consumption related to the above.   -- Will follow cbc in am.        DVT Prophylaxis: Pneumatic Compression Devices. Consider Hep Sq if Plt's improve.   Code Status: Full Code          Jossie Higgins MD    Interval History   Alert, interactive.  Coarse cough    -Data reviewed today: I reviewed all new labs and imaging results over the last 24 hours. I personally reviewed the chest CT image(s) showing prom vasc congestion and mild pulm edema, 12/27.    Physical Exam   Temp: 100.6  F (38.1  C) Temp src: Oral BP:  102/58 Pulse: 95   Resp: 20 SpO2: 96 % O2 Device: Nasal cannula Oxygen Delivery: 2 LPM  Vitals:    12/26/17 0935 12/27/17 0648 12/28/17 0028   Weight: 77.1 kg (170 lb) 78 kg (172 lb) 75.8 kg (167 lb)     Vital Signs with Ranges  Temp:  [97.5  F (36.4  C)-102.3  F (39.1  C)] 100.6  F (38.1  C)  Pulse:  [] 95  Resp:  [16-24] 20  BP: ()/(52-70) 102/58  SpO2:  [90 %-99 %] 96 %  I/O last 3 completed shifts:  In: 3187 [I.V.:2535; Other:580]  Out: 2200 [Urine:2200]    Constitutional: alert   Respiratory: coarse rhonchi throughout  Cardiovascular: regular rate and rhythm without murmer or rub   GI:positive bowel sounds, non-tender   Skin/Integumen: no rashes   Other:        Medications     IV fluid REPLACEMENT ONLY         meropenem  1 g Intravenous Q8H     vancomycin (VANCOCIN) IV  1,500 mg Intravenous Q18H     ipratropium - albuterol 0.5 mg/2.5 mg/3 mL  3 mL Nebulization Q4H While awake     aspirin  81 mg Per J Tube Daily     carBAMazepine  150 mg Oral Q6H     fiber modular  1 packet Per Feeding Tube TID     levothyroxine  150 mcg Per J Tube QAM AC     pantoprazole  20 mg Oral BID     hydrocortisone  10 mg Oral QPM     hydrocortisone  20 mg Oral QAM     Brivaracetam  100 mg Oral BID       Data     Recent Labs  Lab 12/28/17  0715 12/27/17  0643 12/26/17  0930   WBC 5.3 5.8 10.2   HGB 13.3 13.5 16.2   MCV 93 93 92   * 107* 155   NA  --  140 140   POTASSIUM  --  4.0 3.8   CHLORIDE  --  110* 102   CO2  --  26 32   BUN  --  14 18   CR 1.11 0.94 1.09   ANIONGAP  --  4 6   STEVE  --  8.0* 9.4   GLC  --  103* 86   ALBUMIN  --   --  3.6   PROTTOTAL  --   --  8.7   BILITOTAL  --   --  0.5   ALKPHOS  --   --  98   ALT  --   --  35   AST  --   --  26   LIPASE  --   --  363       Recent Results (from the past 24 hour(s))   XR Chest Port 1 View    Narrative    XR CHEST PORT 1 VW 12/27/2017 1:25 PM    COMPARISON: 12/26/2017    HISTORY: Hypoxia.      Impression    IMPRESSION: Prominent vascular congestion and mild  pulmonary edema. No  significant pleural effusion on either side. No pneumothorax.    JESSICA JORDAN MD

## 2017-12-28 NOTE — PLAN OF CARE
Problem: Patient Care Overview  Goal: Plan of Care/Patient Progress Review  OT: Order received, chart reviewed, eval attempted. Per chart review, pt receives A for all ADLs from family or PCA. Defer transfer training and discharge recommendations to PT during hospitalization. OT better suited in next level of care.

## 2017-12-28 NOTE — PLAN OF CARE
Problem: Patient Care Overview  Goal: Plan of Care/Patient Progress Review  Outcome: No Change  Unable to assess orientation, patient able to answer yes/no questions with thumbs up/down nonverbal at baseline. On  4L of O2 able to wean to 2L by end of shift. Frequent harsh/moist unproductive cough. Patient able to clear own secretions, suction at bedside. Lung sounds course. Repositioned q2h patients bottom is blanchable red. Contact precautions for MRSA/VRE. Lerma in place, adequate output after IV lasix. IV SL and Tube feedings on hold for night. Meds given through G tube, pt NPO. Tylenol given for temp of 99.8 axillary. Denied pain. Right side Hemiplegia, chronic.

## 2017-12-28 NOTE — PLAN OF CARE
Problem: Patient Care Overview  Goal: Plan of Care/Patient Progress Review  PT: Patient's mom not present for session. Per discussion with RN, patient is awaiting STAT x-ray, will hold PT session. Plan for RN to assist patient to bedside recliner with use of ceiling lift after xray complete.

## 2017-12-28 NOTE — PLAN OF CARE
Problem: Patient Care Overview  Goal: Plan of Care/Patient Progress Review  HECTOR pt orientation, per mother a/o x 4. Non verbal baseline, able to communicate with thumbs up with yes no questions. Elevated temp this afternoon, gave tylenol. Frequent cough increased shortly after addition of TF. IVF and TF have been stopped and waiting to resume per hospitalist. Lungs very coarse crackles. Now placed on 4 L O2 NC.This AM had emesis episode x 1, gave prn zofran. Incontinent of bowel and bladder. Lerma placed as was started on IV lasix and strict I/O. R sided hemiplegic, contractures. Reposition every 2 hours, total care.

## 2017-12-28 NOTE — CONSULTS
"Received Provider Order for Consult - \"would like to resume TF at 10-15 mL/hr\".  Patient was seen yesterday for a complete assessment ---> See RD note dated 12/27 for details.    Orders written to begin Isosource 1.5 at 15 mL/hr to provide:  540 kcal (7 kcal/kg), 24 g protein (0.3 g/kg), 63 g CHO, 5 g fiber, 274 mL H2O  Also receiving Nutrisource Fiber 1 pkt TID= 45 kcal, 9 g fiber  Total= 585 kcal (8 kcal/kg), 14 g fiber     If above tolerated, recommend advance slowly to eventual goal (as documented in yesterday's note) of Isosource 1.5 at 60 mL/hr to provide:  1980 kcal, 90 g protein (1.2 g/kg), 20 g fiber, 1000 mL H2O  Total with Nutrisource Fiber= 2025 kcal (26 kcal/kg), 29 g fiber    Swati Parrish RD, LD, [facility]   Clinical Dietitian - [facility]         "

## 2017-12-29 ENCOUNTER — APPOINTMENT (OUTPATIENT)
Dept: INTERVENTIONAL RADIOLOGY/VASCULAR | Facility: CLINIC | Age: 57
DRG: 178 | End: 2017-12-29
Attending: INTERNAL MEDICINE
Payer: MEDICARE

## 2017-12-29 LAB
ANION GAP SERPL CALCULATED.3IONS-SCNC: 6 MMOL/L (ref 3–14)
BUN SERPL-MCNC: 11 MG/DL (ref 7–30)
CALCIUM SERPL-MCNC: 8.8 MG/DL (ref 8.5–10.1)
CHLORIDE SERPL-SCNC: 101 MMOL/L (ref 94–109)
CO2 SERPL-SCNC: 33 MMOL/L (ref 20–32)
CREAT SERPL-MCNC: 1.02 MG/DL (ref 0.66–1.25)
ERYTHROCYTE [DISTWIDTH] IN BLOOD BY AUTOMATED COUNT: 14.2 % (ref 10–15)
GFR SERPL CREATININE-BSD FRML MDRD: 76 ML/MIN/1.7M2
GLUCOSE SERPL-MCNC: 106 MG/DL (ref 70–99)
HCT VFR BLD AUTO: 41.6 % (ref 40–53)
HGB BLD-MCNC: 14.1 G/DL (ref 13.3–17.7)
MCH RBC QN AUTO: 31.1 PG (ref 26.5–33)
MCHC RBC AUTO-ENTMCNC: 33.9 G/DL (ref 31.5–36.5)
MCV RBC AUTO: 92 FL (ref 78–100)
PLATELET # BLD AUTO: 128 10E9/L (ref 150–450)
POTASSIUM SERPL-SCNC: 3.6 MMOL/L (ref 3.4–5.3)
PROCALCITONIN SERPL-MCNC: <0.05 NG/ML
RBC # BLD AUTO: 4.53 10E12/L (ref 4.4–5.9)
SODIUM SERPL-SCNC: 140 MMOL/L (ref 133–144)
VANCOMYCIN SERPL-MCNC: 22 MG/L
WBC # BLD AUTO: 6 10E9/L (ref 4–11)

## 2017-12-29 PROCEDURE — 27210815 ZZ H TUBE GASTRO CR13

## 2017-12-29 PROCEDURE — 49452 REPLACE G-J TUBE PERC: CPT

## 2017-12-29 PROCEDURE — A9270 NON-COVERED ITEM OR SERVICE: HCPCS | Mod: GY | Performed by: INTERNAL MEDICINE

## 2017-12-29 PROCEDURE — A9270 NON-COVERED ITEM OR SERVICE: HCPCS | Mod: GY | Performed by: PHYSICIAN ASSISTANT

## 2017-12-29 PROCEDURE — 12000007 ZZH R&B INTERMEDIATE

## 2017-12-29 PROCEDURE — 80048 BASIC METABOLIC PNL TOTAL CA: CPT | Performed by: INTERNAL MEDICINE

## 2017-12-29 PROCEDURE — 0D2DXUZ CHANGE FEEDING DEVICE IN LOWER INTESTINAL TRACT, EXTERNAL APPROACH: ICD-10-PCS | Performed by: RADIOLOGY

## 2017-12-29 PROCEDURE — 25000132 ZZH RX MED GY IP 250 OP 250 PS 637: Mod: GY | Performed by: PHYSICIAN ASSISTANT

## 2017-12-29 PROCEDURE — 40000275 ZZH STATISTIC RCP TIME EA 10 MIN

## 2017-12-29 PROCEDURE — C1769 GUIDE WIRE: HCPCS

## 2017-12-29 PROCEDURE — 25000128 H RX IP 250 OP 636: Performed by: INTERNAL MEDICINE

## 2017-12-29 PROCEDURE — 27210429 ZZH NUTRITION PRODUCT INTERMEDIATE LITER

## 2017-12-29 PROCEDURE — 94640 AIRWAY INHALATION TREATMENT: CPT | Mod: 76

## 2017-12-29 PROCEDURE — 99232 SBSQ HOSP IP/OBS MODERATE 35: CPT | Performed by: INTERNAL MEDICINE

## 2017-12-29 PROCEDURE — 25000132 ZZH RX MED GY IP 250 OP 250 PS 637: Mod: GY | Performed by: INTERNAL MEDICINE

## 2017-12-29 PROCEDURE — 80202 ASSAY OF VANCOMYCIN: CPT | Performed by: INTERNAL MEDICINE

## 2017-12-29 PROCEDURE — 85027 COMPLETE CBC AUTOMATED: CPT | Performed by: INTERNAL MEDICINE

## 2017-12-29 PROCEDURE — 27210905 ZZH KIT CR7

## 2017-12-29 PROCEDURE — 36415 COLL VENOUS BLD VENIPUNCTURE: CPT | Performed by: INTERNAL MEDICINE

## 2017-12-29 PROCEDURE — 84145 PROCALCITONIN (PCT): CPT | Performed by: INTERNAL MEDICINE

## 2017-12-29 PROCEDURE — 27210995 ZZH RX 272: Performed by: PHYSICIAN ASSISTANT

## 2017-12-29 PROCEDURE — 25000125 ZZHC RX 250: Performed by: INTERNAL MEDICINE

## 2017-12-29 RX ADMIN — BRIVARACETAM 100 MG: 10 SOLUTION ORAL at 22:02

## 2017-12-29 RX ADMIN — Medication 1 PACKET: at 09:21

## 2017-12-29 RX ADMIN — Medication 1 PACKET: at 17:15

## 2017-12-29 RX ADMIN — CARBAMAZEPINE 150 MG: 100 SUSPENSION ORAL at 17:15

## 2017-12-29 RX ADMIN — CARBAMAZEPINE 150 MG: 100 SUSPENSION ORAL at 11:16

## 2017-12-29 RX ADMIN — IPRATROPIUM BROMIDE AND ALBUTEROL SULFATE 3 ML: .5; 3 SOLUTION RESPIRATORY (INHALATION) at 21:29

## 2017-12-29 RX ADMIN — CARBAMAZEPINE 150 MG: 100 SUSPENSION ORAL at 22:02

## 2017-12-29 RX ADMIN — ASPIRIN 81 MG 81 MG: 81 TABLET ORAL at 09:18

## 2017-12-29 RX ADMIN — PANTOPRAZOLE SODIUM 20 MG: 40 TABLET, DELAYED RELEASE ORAL at 20:44

## 2017-12-29 RX ADMIN — IPRATROPIUM BROMIDE AND ALBUTEROL SULFATE 3 ML: .5; 3 SOLUTION RESPIRATORY (INHALATION) at 07:31

## 2017-12-29 RX ADMIN — VANCOMYCIN HYDROCHLORIDE 1500 MG: 5 INJECTION, POWDER, LYOPHILIZED, FOR SOLUTION INTRAVENOUS at 09:00

## 2017-12-29 RX ADMIN — CARBAMAZEPINE 150 MG: 100 SUSPENSION ORAL at 05:40

## 2017-12-29 RX ADMIN — BRIVARACETAM 100 MG: 10 SOLUTION ORAL at 09:19

## 2017-12-29 RX ADMIN — IPRATROPIUM BROMIDE AND ALBUTEROL SULFATE 3 ML: .5; 3 SOLUTION RESPIRATORY (INHALATION) at 23:38

## 2017-12-29 RX ADMIN — HYDROCORTISONE 20 MG: 10 TABLET ORAL at 09:18

## 2017-12-29 RX ADMIN — LEVOTHYROXINE SODIUM 150 MCG: 150 TABLET ORAL at 09:18

## 2017-12-29 RX ADMIN — Medication 1 PACKET: at 22:02

## 2017-12-29 RX ADMIN — IPRATROPIUM BROMIDE AND ALBUTEROL SULFATE 3 ML: .5; 3 SOLUTION RESPIRATORY (INHALATION) at 11:31

## 2017-12-29 RX ADMIN — MEROPENEM AND SODIUM CHLORIDE 1 G: 1 INJECTION, SOLUTION INTRAVENOUS at 05:40

## 2017-12-29 RX ADMIN — HYDROCORTISONE 10 MG: 10 TABLET ORAL at 20:43

## 2017-12-29 NOTE — PROVIDER NOTIFICATION
Paged hospitalist regarding call from [facility] for positive lab for RSV.     Update to add droplet precaution

## 2017-12-29 NOTE — PROVIDER NOTIFICATION
Brief update:    Paged re: positive RSV    Contact and droplet isolation added    Note pt remains on antibiotics for suspected aspiration event. Will defer discontinuation to rounding provider as pt did have vomiting episodes prior to admission (could have both viral illness and aspiration).    Jerald Villavicencio MD  11:19 PM

## 2017-12-29 NOTE — PLAN OF CARE
Problem: Patient Care Overview  Goal: Plan of Care/Patient Progress Review  Outcome: No Change  Alert to self, non verbal, admitted for fever and aspiration pneumonia, up with ceiling lift, on tube feeding goal advanced to 60 cc, incontinent of bowels, has aaron in place, reposition in bed, on oxygen at 2 L NC, patient refuses to put it at times, on duo nebs.

## 2017-12-29 NOTE — PROGRESS NOTES
[facility]    Hospitalist Progress Note    Assessment & Plan   Keyon Farias is a 57 year old male who was admitted on 12/26/2017.       Keyon Farias is a 57-year-old gentleman with past medical history of TBI with right spastic hemiplegia, aphasia, dysphagia, status G-tube placement, seizure disorder, panhypopituitarism and history of recurrent aspiration pneumonia as well as necrotizing pancreatitis who presented to the Emergency Department after an episode of vomiting nith subsequent development of fever and cough with concern for aspiration pneumonia.  T-max 100 degrees Fahrenheit with a tachycardia of 113 beats per minute, all other vitals within normal limits.       Aspiration pneumonitis Vs developing PNA vs viral pneumonitis  Patient with 1 transient episode of vomiting the evening PTA with subsequent development of a fever, T-max of 100.8 degrees Fahrenheit and cough.  History of aspiration pneumonia with pseudomonas lung colonization (Sens to Cipro and Meropenem).  Last hospitalization for this was in 10/2017.  WBC within normal limits.  Procalcitonin 0.05.  Chest x-ray shows right basilar atelectasis.  CMP, UA, influenza negative.  Lactic acid was initially 2.3 but improved to 2.0 after IV fluid hydration resuscitation.  The patient was given a dose of Cipro, meropenem and vancomycin consistent with prior treatment for aspiration pneumonia during last hospitalization in the ER.   -- Given initial procalcitonin 0.05, no fever documented, No leukocytosis and no clear evidence of bacterial infection his Abx were not continued on admit.  -- Repeat Procal unchanged but he has spike a fever of 102.3 12/27,  exam  hypoxic at 87% on RA, tachycardic, with increased work of breathing and coarse BS. Rn reports and episode of vomiting 12/27 am and inability to clear secretions, may have aspirated this am 12/27    -- continue HOB > 35 degrees.   -tube feeds held 12/27, 12/28restarted at 10-15 cc/hr, will  have nutrition see   -- CXR 12/27  With pulm vasc congestion and mild pulm edema  -received 2 doses of iv lasix 20 mg 12/27-12/28   -- weight is down 75.8<78<77.1  -- 12/27  initiated broad abx coverage with Vanco and Meropenem. Follow Sputum Cx.     -- Blood cultures NGTD  -- UA without signs of infection.  -- Duonebs    -CXR 12/28 with some streaky atelectasis, but no infiltrate  -12/29, afebrile today, procalcitonin <0.05m low risk for bacterial infectio  -influenza neg but RSV did return positive  -due to afebrile, low procalcitonin, normal wbc and pos RSV, will d/c antibiotics 10/29     Traumatic brain injury with aphasia, dysphasia, status post G-tube placement and right spastic hemiplegia:    Patient initially presented with a clogged G-tube, but this was resolved after ED treatment.       Seizure disorder:    -- Thought due to TBI.  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  Continue PTA hydrocortisone and Synthroid.    -- if BP low will start stress dose of steroids but BP ok  For now      History of necrotizing pancreatitis:    Hospitalized in 2/2017.  The patient had a cystogastrostomy tube placed at that time with cultures growing highly resistant pseudomonas and vancomycin resistant enterococcus.  This has been stable.   -- Lipase normal and with a normal abd exam.       Thrombocytopenia  -- Plt's 155 on admit and 107 12/27. Have been low in the past as well during times of infection.   Likely diluted and consumption related to the above.   -- Will follow cbc in am.      FEN  -feeding tube clogged today  -order for IR to replace       DVT Prophylaxis: Pneumatic Compression Devices. Consider Hep Sq if Plt's improve.   Code Status: Full Code         Jossie Higgins MD    Interval History   Appears more alert and cheerful today    -Data reviewed today: I reviewed all new labs and imaging results over the last 24 hours. I personally reviewed no images or  EKG's today.    Physical Exam   Temp: 98.3  F (36.8  C) Temp src: Axillary BP: 93/57 Pulse: 87   Resp: 20 SpO2: 92 % O2 Device: Nasal cannula Oxygen Delivery: 2 LPM  Vitals:    12/26/17 0935 12/27/17 0648 12/28/17 0028   Weight: 77.1 kg (170 lb) 78 kg (172 lb) 75.8 kg (167 lb)     Vital Signs with Ranges  Temp:  [97.3  F (36.3  C)-98.3  F (36.8  C)] 98.3  F (36.8  C)  Pulse:  [] 87  Resp:  [18-20] 20  BP: ()/(57-72) 93/57  SpO2:  [88 %-97 %] 92 %  I/O last 3 completed shifts:  In: 855 [I.V.:50; Other:670]  Out: 1800 [Urine:1800]    Constitutional: alert  Respiratory:  Diffuse rhonchi throughout  Cardiovascular: regular rate and rhythm without murmer or rub   GI:positive bowel sounds, non-tender   Skin/Integumen: no rashes    Other:        Medications     IV fluid REPLACEMENT ONLY         vancomycin (VANCOCIN) IV  1,500 mg Intravenous Q24H     meropenem  1 g Intravenous Q8H     ipratropium - albuterol 0.5 mg/2.5 mg/3 mL  3 mL Nebulization Q4H While awake     aspirin  81 mg Per J Tube Daily     carBAMazepine  150 mg Oral Q6H     fiber modular  1 packet Per Feeding Tube TID     levothyroxine  150 mcg Per J Tube QAM AC     pantoprazole  20 mg Oral BID     hydrocortisone  10 mg Oral QPM     hydrocortisone  20 mg Oral QAM     Brivaracetam  100 mg Oral BID       Data     Recent Labs  Lab 12/29/17  0705 12/28/17  0715 12/27/17  0643 12/26/17  0930   WBC 6.0 5.3 5.8 10.2   HGB 14.1 13.3 13.5 16.2   MCV 92 93 93 92   * 114* 107* 155     --  140 140   POTASSIUM 3.6  --  4.0 3.8   CHLORIDE 101  --  110* 102   CO2 33*  --  26 32   BUN 11  --  14 18   CR 1.02 1.11 0.94 1.09   ANIONGAP 6  --  4 6   STEVE 8.8  --  8.0* 9.4   *  --  103* 86   ALBUMIN  --   --   --  3.6   PROTTOTAL  --   --   --  8.7   BILITOTAL  --   --   --  0.5   ALKPHOS  --   --   --  98   ALT  --   --   --  35   AST  --   --   --  26   LIPASE  --   --   --  363       Recent Results (from the past 24 hour(s))   XR Chest Port 1 View     Narrative    XR CHEST PORT 1 VW 12/28/2017 12:02 PM    HISTORY: Vascular congestion.    COMPARISON: December 27, 2017.      Impression    IMPRESSION: Lung volumes are low with streaky bibasilar opacities,  likely atelectasis. No pleural effusions or pneumothorax appreciated.  Stable heart and mediastinum.    JULISA CONWAY MD

## 2017-12-29 NOTE — PLAN OF CARE
Problem: Patient Care Overview  Goal: Plan of Care/Patient Progress Review  Pt is nonverbal at baseline, HECTOR orientation. Communicates well with thumbs up. Seems much more joyful this AM. Temp of 100.6 this AM soft BPs. Denies any pain. Lungs have improved over yesterday, still crackles and expiratory wheezes. Less frequent coughing through shift. TF restarted at 15 ml /hr, free water flushes set for 60 ml every four hours. Pt was up in recliner with lift. Collect RSV swab, results pending. NPO for intake, GJ tube in place.

## 2017-12-29 NOTE — PROCEDURES
RADIOLOGY PROCEDURE NOTE  Patient name: Keyon Farias  MRN: [id number]  : 1960    Pre-procedure diagnosis: Nutrition needs  Post-procedure diagnosis: Same    Procedure Date/Time: 2017  1:52 PM  Procedure: 18 Fr GJ tube exchange.  No complications.  Ready for immediate use, as before.  Estimated blood loss: < 5ml  Specimen(s) collected with description: Existing GJ tube  The patient tolerated the procedure well with no immediate complications.  Significant findings:  Please see above.    See imaging dictation for procedural details.    Provider name: Brandon Villafana  Assistant(s):None

## 2017-12-29 NOTE — PROVIDER NOTIFICATION
Patient tube feeding not working, clogged, could not get it to open, MD notified, waiting call back.

## 2017-12-29 NOTE — PROGRESS NOTES
CLINICAL NUTRITION SERVICES - REASSESSMENT NOTE      Recommendations Ordered by Registered Dietitian (RD): Goal TF of Isosource 1.5 at 60 mL/hr (x22 hours per day) to provide:  1980 kcal, 90 g protein (1.2 g/kg), 20 g fiber, 1000 mL H2O  NutriSource Fiber TID = 45 kcal, 9 g fiber  Total = 2025 kcal (26 kcal/kg), 29 g fiber        EVALUATION OF PROGRESS TOWARD GOALS   Diet:  NPO  Nutrition Support:  TF off for G-J tube exchanged (was clogged).  Patient was previously receiving TF at low rate as follows ~    Nutrition Support Enteral:  Type of Feeding Tube: G-J tube   Enteral Frequency:  Continuous  Enteral Regimen: Isosource 1.5 at 15 mL/hr  Total Enteral Provisions: 540 kcal (7 kcal/kg), 24 g protein (0.3 g/kg), 63 g CHO, 5 g fiber, 274 mL H2O  Free Water Flush: 160 mL every 4 hours   Also receiving NutriSource Fiber 1 pkt TID= 45 kcal, 9 g fiber  Total (TF + NutriSource Fiber)= 585 kcal (8 kcal/kg), 14 g fiber     Intake/Tolerance:    Labs noted and acceptable  Glucose 106  No stool recorded thus far       Dosing Weight 78 kg - current wt     ASSESSED NUTRITION NEEDS PER APPROVED PRACTICE GUIDELINES:  Estimated Energy Needs: 0652-9371 kcals (25-30 Kcal/Kg)  Justification: maintenance  Estimated Protein Needs:  grams protein (1.2-1.4 g pro/Kg)  Justification: preservation of lean body mass    NEW FINDINGS:   Patient s/p G-J tube exchange in IR today   Holding TF 1 hour before and 1 hour after Synthroid administration     Previous Goals (12/27):   Isosource 1.5  at goal of 60 mL/hr (x22 hr/day) will meet assessed needs  Evaluation: Not met    Previous Nutrition Diagnosis (12/27):   No nutrition diagnosis identified at this time   Evaluation: Declining      CURRENT NUTRITION DIAGNOSIS  Inadequate enteral nutrition infusion related to TF on hold due to clogged tube as evidenced by meeting 0% needs    INTERVENTIONS  Recommendations / Nutrition Prescription  Goal TF of Isosource 1.5 at 60 mL/hr (x22 hours per day)  to provide:  1980 kcal, 90 g protein (1.2 g/kg), 20 g fiber, 1000 mL H2O  NutriSource Fiber TID = 45 kcal, 9 g fiber  Total = 2025 kcal (26 kcal/kg), 29 g fiber     Implementation  EN Composition:  TF orders written to resume Isosource 1.5 at 15 mL/hr and increase every 8 hours by 15 mL to goal     Goals  Isosource 1.5 at 60 mL/hr (x22 hours) will meet % needs    MONITORING AND EVALUATION:  Progress towards goals will be monitored and evaluated per protocol and Practice Guidelines    Swati Parrish RD, LD, [facility]   Clinical Dietitian - [facility]

## 2017-12-29 NOTE — PLAN OF CARE
Problem: Patient Care Overview  Goal: Plan of Care/Patient Progress Review  Outcome: No Change  HECTOR orientation due to nonverbal at baseline. Pt will reply with thumbs up to yes or no. Droplet and Contact precautions. LS fine crackles with expiratory wheeezing at times. Intermittent nonproductiove cough. Tachy HR . 2L NC 96%, tried to wean but O2 86-91%, pt refused NC at times and takes off NC at times. Tube feed at 15ml/hr with scheduled 60ml flushes into pump, GJ tube patent. Pt is NPO due to TF. Lerma patent, incontinent of stool. Total care, mechanical lift,  t/r q 2hrs. Blanchable redness on coccyx. IV SL, infused abx x2 this shift. IV Vanco held until 0900 today due to vanco lab result. Continue to monitor.

## 2017-12-29 NOTE — PHARMACY-VANCOMYCIN DOSING SERVICE
Pharmacy Vancomycin Note  Date of Service 2017  Patient's  [id number]   57 year old, male    Indication: Aspiration Pneumonia  Goal Trough Level: 15-20 mg/L  Day of Therapy: 3  Current Vancomycin regimen:  1500 mg IV q18h    Current estimated CrCl = Estimated Creatinine Clearance: 80.6 mL/min (based on Cr of 1.11).    Creatinine for last 3 days  2017:  9:30 AM Creatinine 1.09 mg/dL  2017:  6:43 AM Creatinine 0.94 mg/dL  2017:  7:15 AM Creatinine 1.11 mg/dL    Recent Vancomycin Levels (past 3 days)  2017: 12:45 AM Vancomycin Level 22.0 mg/L    Vancomycin IV Administrations (past 72 hours)                   vancomycin (VANCOCIN) 1500 mg in 0.9% NaCl 250 mL PREMIX (mg) 1,500 mg New Bag 17 0842     1,500 mg New Bag 17 1430                Nephrotoxins and other renal medications (Future)    Start     Dose/Rate Route Frequency Ordered Stop    17 1400  vancomycin (VANCOCIN) 1500 mg in 0.9% NaCl 250 mL PREMIX      1,500 mg  166.7 mL/hr over 90 Minutes Intravenous EVERY 18 HOURS 17 1318               Contrast Orders - past 72 hours (72h ago through future)    Start     Dose/Rate Route Frequency Ordered Stop    17 1000  perflutren diluted 1mL to 1mL with saline (OPTISON) diluted injection 2 mL      2 mL Intravenous ONCE 17 0954 17 0955          Interpretation of levels and current regimen:  Trough level is  Supratherapeutic    Has serum creatinine changed > 50% in last 72 hours: No    Urine output:      Renal Function: Stable    Plan:  1.  Decrease Dose to 1500mg q24h  2.  Pharmacy will check trough levels as appropriate in 1-3 Days.    3. Serum creatinine levels will be ordered daily for the first week of therapy and at least twice weekly for subsequent weeks.      Adarsh Moncada        .

## 2017-12-29 NOTE — IR NOTE
Interventional Radiology Intra-procedural Nursing Note    Patient Name: Keyon Farias  Medical Record Number: [id number]  Today's Date: December 29, 2017    Start Time: 1340  End of procedure time: 1347  Procedure: G/J TUBE REPLACEMENT  Report given to: NANCI ALLEN ON STATION [address]. NO QUESTIONS AT THIS TIME.  Time pt departs: 1352    Other Notes: NO SEDATION USED. NEW G/J TUBE IN PLACE.   MARIBETH LOVELL

## 2017-12-29 NOTE — PLAN OF CARE
Problem: Patient Care Overview  Goal: Plan of Care/Patient Progress Review  PT: Attempted to see for PT session. Pt recently back from a procedure and received sedation, unable to participate.

## 2017-12-30 LAB
ANION GAP SERPL CALCULATED.3IONS-SCNC: 6 MMOL/L (ref 3–14)
BUN SERPL-MCNC: 10 MG/DL (ref 7–30)
CALCIUM SERPL-MCNC: 8.4 MG/DL (ref 8.5–10.1)
CHLORIDE SERPL-SCNC: 101 MMOL/L (ref 94–109)
CO2 SERPL-SCNC: 30 MMOL/L (ref 20–32)
CREAT SERPL-MCNC: 0.82 MG/DL (ref 0.66–1.25)
ERYTHROCYTE [DISTWIDTH] IN BLOOD BY AUTOMATED COUNT: 13.8 % (ref 10–15)
GFR SERPL CREATININE-BSD FRML MDRD: >90 ML/MIN/1.7M2
GLUCOSE SERPL-MCNC: 90 MG/DL (ref 70–99)
HCT VFR BLD AUTO: 37.7 % (ref 40–53)
HGB BLD-MCNC: 12.9 G/DL (ref 13.3–17.7)
MCH RBC QN AUTO: 30.9 PG (ref 26.5–33)
MCHC RBC AUTO-ENTMCNC: 34.2 G/DL (ref 31.5–36.5)
MCV RBC AUTO: 90 FL (ref 78–100)
PLATELET # BLD AUTO: 132 10E9/L (ref 150–450)
POTASSIUM SERPL-SCNC: 3.4 MMOL/L (ref 3.4–5.3)
RBC # BLD AUTO: 4.18 10E12/L (ref 4.4–5.9)
SODIUM SERPL-SCNC: 137 MMOL/L (ref 133–144)
WBC # BLD AUTO: 6.4 10E9/L (ref 4–11)

## 2017-12-30 PROCEDURE — 12000007 ZZH R&B INTERMEDIATE

## 2017-12-30 PROCEDURE — 25000132 ZZH RX MED GY IP 250 OP 250 PS 637: Mod: GY | Performed by: PHYSICIAN ASSISTANT

## 2017-12-30 PROCEDURE — 25000132 ZZH RX MED GY IP 250 OP 250 PS 637: Mod: GY | Performed by: INTERNAL MEDICINE

## 2017-12-30 PROCEDURE — 36415 COLL VENOUS BLD VENIPUNCTURE: CPT | Performed by: INTERNAL MEDICINE

## 2017-12-30 PROCEDURE — 80048 BASIC METABOLIC PNL TOTAL CA: CPT | Performed by: INTERNAL MEDICINE

## 2017-12-30 PROCEDURE — 40000275 ZZH STATISTIC RCP TIME EA 10 MIN

## 2017-12-30 PROCEDURE — 99232 SBSQ HOSP IP/OBS MODERATE 35: CPT | Performed by: INTERNAL MEDICINE

## 2017-12-30 PROCEDURE — A9270 NON-COVERED ITEM OR SERVICE: HCPCS | Mod: GY | Performed by: INTERNAL MEDICINE

## 2017-12-30 PROCEDURE — 85027 COMPLETE CBC AUTOMATED: CPT | Performed by: INTERNAL MEDICINE

## 2017-12-30 PROCEDURE — 94640 AIRWAY INHALATION TREATMENT: CPT

## 2017-12-30 PROCEDURE — 25000125 ZZHC RX 250: Performed by: INTERNAL MEDICINE

## 2017-12-30 PROCEDURE — 94640 AIRWAY INHALATION TREATMENT: CPT | Mod: 76

## 2017-12-30 PROCEDURE — A9270 NON-COVERED ITEM OR SERVICE: HCPCS | Mod: GY | Performed by: PHYSICIAN ASSISTANT

## 2017-12-30 PROCEDURE — 27210995 ZZH RX 272: Performed by: PHYSICIAN ASSISTANT

## 2017-12-30 RX ADMIN — HYDROCORTISONE 10 MG: 10 TABLET ORAL at 21:14

## 2017-12-30 RX ADMIN — IPRATROPIUM BROMIDE AND ALBUTEROL SULFATE 3 ML: .5; 3 SOLUTION RESPIRATORY (INHALATION) at 07:20

## 2017-12-30 RX ADMIN — LEVOTHYROXINE SODIUM 150 MCG: 150 TABLET ORAL at 09:58

## 2017-12-30 RX ADMIN — CARBAMAZEPINE 150 MG: 100 SUSPENSION ORAL at 05:17

## 2017-12-30 RX ADMIN — CARBAMAZEPINE 150 MG: 100 SUSPENSION ORAL at 22:46

## 2017-12-30 RX ADMIN — PANTOPRAZOLE SODIUM 20 MG: 40 TABLET, DELAYED RELEASE ORAL at 21:14

## 2017-12-30 RX ADMIN — Medication 1 PACKET: at 22:40

## 2017-12-30 RX ADMIN — IPRATROPIUM BROMIDE AND ALBUTEROL SULFATE 3 ML: .5; 3 SOLUTION RESPIRATORY (INHALATION) at 20:52

## 2017-12-30 RX ADMIN — CARBAMAZEPINE 150 MG: 100 SUSPENSION ORAL at 12:57

## 2017-12-30 RX ADMIN — BRIVARACETAM 100 MG: 10 SOLUTION ORAL at 10:24

## 2017-12-30 RX ADMIN — Medication 1 PACKET: at 09:59

## 2017-12-30 RX ADMIN — PANTOPRAZOLE SODIUM 20 MG: 40 TABLET, DELAYED RELEASE ORAL at 12:57

## 2017-12-30 RX ADMIN — HYDROCORTISONE 20 MG: 10 TABLET ORAL at 09:59

## 2017-12-30 RX ADMIN — BRIVARACETAM 100 MG: 10 SOLUTION ORAL at 21:28

## 2017-12-30 RX ADMIN — CARBAMAZEPINE 150 MG: 100 SUSPENSION ORAL at 17:48

## 2017-12-30 RX ADMIN — ASPIRIN 81 MG 81 MG: 81 TABLET ORAL at 09:59

## 2017-12-30 RX ADMIN — IPRATROPIUM BROMIDE AND ALBUTEROL SULFATE 3 ML: .5; 3 SOLUTION RESPIRATORY (INHALATION) at 16:11

## 2017-12-30 RX ADMIN — IPRATROPIUM BROMIDE AND ALBUTEROL SULFATE 3 ML: .5; 3 SOLUTION RESPIRATORY (INHALATION) at 11:44

## 2017-12-30 NOTE — PLAN OF CARE
Problem: Patient Care Overview  Goal: Plan of Care/Patient Progress Review  Outcome: No Change  VSS, alert to self only, nonverbal. Lerma patent. Tube feed increased from 30 to 45cc. Repositioned in bed. Will continue to monitor.

## 2017-12-30 NOTE — PLAN OF CARE
Problem: Pneumonia (Adult)  Goal: Signs and Symptoms of Listed Potential Problems Will be Absent, Minimized or Managed (Pneumonia)  Signs and symptoms of listed potential problems will be absent, minimized or managed by discharge/transition of care (reference Pneumonia (Adult) CPG).   Outcome: Improving  Alert. Nonverbal. Turned/repositioned q3-4 hours. POD1 G/J tube replacement. Hx of TBI; R hemiplegia. Lerma patent. TF infusing at 60 mL/hr. VSS, LS coarse. Unable to produce sputum for sample. Blanchable reddened area on coccyx covered with mepilex.  Yeast-like substance cleaned from groin area and barrier applied to the red area where substance was. Tap pad for call light. Incontinent of BM. Plan to d/c tomorrow.

## 2017-12-30 NOTE — PROGRESS NOTES
[facility]    Hospitalist Progress Note    Assessment & Plan   Keyon Farias is a 57 year old male who was admitted on 12/26/2017.     Keyon Farias is a 57-year-old gentleman with past medical history of TBI with right spastic hemiplegia, aphasia, dysphagia, status G-tube placement, seizure disorder, panhypopituitarism and history of recurrent aspiration pneumonia as well as necrotizing pancreatitis who presented to the Emergency Department after an episode of vomiting nith subsequent development of fever and cough with concern for aspiration pneumonia.  T-max 100 degrees Fahrenheit with a tachycardia of 113 beats per minute, all other vitals within normal limits.       Aspiration pneumonitis Vs developing PNA vs viral pneumonitis  Patient with 1 transient episode of vomiting the evening PTA with subsequent development of a fever, T-max of 100.8 degrees Fahrenheit and cough.  History of aspiration pneumonia with pseudomonas lung colonization (Sens to Cipro and Meropenem).  Last hospitalization for this was in 10/2017.  WBC within normal limits.  Procalcitonin 0.05.  Chest x-ray shows right basilar atelectasis.  CMP, UA, influenza negative.  Lactic acid was initially 2.3 but improved to 2.0 after IV fluid hydration resuscitation.  The patient was given a dose of Cipro, meropenem and vancomycin consistent with prior treatment for aspiration pneumonia during last hospitalization in the ER.   -- Given initial procalcitonin 0.05, no fever documented, No leukocytosis and no clear evidence of bacterial infection his Abx were not continued on admit.  -- Repeat Procal unchanged but he has spike a fever of 102.3 12/27,  exam  hypoxic at 87% on RA, tachycardic, with increased work of breathing and coarse BS. Rn reports and episode of vomiting 12/27 am and inability to clear secretions, may have aspirated this am 12/27    -- continue HOB > 35 degrees.   -tube feeds held 12/27, 12/28restarted at 10-15 cc/hr,   nutrition following  -- CXR 12/27  With pulm vasc congestion and mild pulm edema  -received 2 doses of iv lasix 20 mg 12/27-12/28   -- weight is down 75.8<78<77.1  -- 12/27  initiated broad abx coverage with Vanco and Meropenem. Follow Sputum Cx.     -- Blood cultures NGTD  -- UA without signs of infection.  -- Duonebs    -CXR 12/28 with some streaky atelectasis, but no infiltrate  -12/29, afebrile  procalcitonin <0.05m low risk for bacterial infectio  -influenza neg but RSV did return positive  -due to afebrile, low procalcitonin, normal wbc and pos RSV, d/c antibiotics 10/29  -today is afebrile, off supplemental oxygen, but still with congestion and rhonchi  -called pt mother for update, she is ill at the present time      Traumatic brain injury with aphasia, dysphasia, status post G-tube placement and right spastic hemiplegia:    Patient initially presented with a clogged G-tube, but this was resolved after ED treatment.   -clogged again 12/29, replaced by IR without incident, now on full tube feeds      Seizure disorder:    -- Thought due to TBI.  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  Continue PTA hydrocortisone and Synthroid.    -- if BP low will start stress dose of steroids but BP ok  For now      History of necrotizing pancreatitis:    Hospitalized in 2/2017.  The patient had a cystogastrostomy tube placed at that time with cultures growing highly resistant pseudomonas and vancomycin resistant enterococcus.  This has been stable.   -- Lipase normal and with a normal abd exam.       Thrombocytopenia  -- Plt's 155 on admit and 107 12/27. Have been low in the past as well during times of infection.   Likely diluted and consumption related to the above.   -- Will follow cbc in am.       FEN  -feeding tube clogged 12/29  - IR replaced 18 Yoruba TORY gastrojejunostomy tube  -now back on full feeds      DVT Prophylaxis: Pneumatic Compression Devices. Consider Hep Sq  if Plt's improve.   Code Status: Full Code           Jossie Higgins MD    Interval History   Very interactive, more energy, still with some coughing, wheezing and rhonchi on exam    -Data reviewed today: I reviewed all new labs and imaging results over the last 24 hours. I personally reviewed no images or EKG's today.    Physical Exam   Temp: 98  F (36.7  C) Temp src: Oral BP: 120/58 Pulse: 98   Resp: 20 SpO2: 96 % O2 Device: None (Room air)    Vitals:    12/27/17 0648 12/28/17 0028 12/30/17 0540   Weight: 78 kg (172 lb) 75.8 kg (167 lb) 78 kg (171 lb 14.4 oz)     Vital Signs with Ranges  Temp:  [97.3  F (36.3  C)-98  F (36.7  C)] 98  F (36.7  C)  Pulse:  [] 98  Resp:  [20] 20  BP: ()/(55-69) 120/58  SpO2:  [90 %-96 %] 96 %  I/O last 3 completed shifts:  In: 764 [NG/GT:764]  Out: 650 [Urine:650]    Constitutional: alert   Respiratory: occasional cough, coarse rhonchi throughout R>L  Cardiovascular: regular rate and rhythm without murmer or rub   GI:positive bowel sounds, non-tender   Skin/Integumen: no rashes   Other:        Medications     IV fluid REPLACEMENT ONLY         ipratropium - albuterol 0.5 mg/2.5 mg/3 mL  3 mL Nebulization Q4H While awake     aspirin  81 mg Per J Tube Daily     carBAMazepine  150 mg Oral Q6H     fiber modular  1 packet Per Feeding Tube TID     levothyroxine  150 mcg Per J Tube QAM AC     pantoprazole  20 mg Oral BID     hydrocortisone  10 mg Oral QPM     hydrocortisone  20 mg Oral QAM     Brivaracetam  100 mg Oral BID       Data     Recent Labs  Lab 12/30/17  0735 12/29/17  0705 12/28/17  0715 12/27/17  0643 12/26/17  0930   WBC 6.4 6.0 5.3 5.8 10.2   HGB 12.9* 14.1 13.3 13.5 16.2   MCV 90 92 93 93 92   * 128* 114* 107* 155    140  --  140 140   POTASSIUM 3.4 3.6  --  4.0 3.8   CHLORIDE 101 101  --  110* 102   CO2 30 33*  --  26 32   BUN 10 11  --  14 18   CR 0.82 1.02 1.11 0.94 1.09   ANIONGAP 6 6  --  4 6   STEVE 8.4* 8.8  --  8.0* 9.4   GLC 90 106*  --   103* 86   ALBUMIN  --   --   --   --  3.6   PROTTOTAL  --   --   --   --  8.7   BILITOTAL  --   --   --   --  0.5   ALKPHOS  --   --   --   --  98   ALT  --   --   --   --  35   AST  --   --   --   --  26   LIPASE  --   --   --   --  363       Recent Results (from the past 24 hour(s))   IR Gastro Jejunostomy Tube Change    Narrative    INTERVENTIONAL RADIOGRAPHY  GASTROJEJUNOSTOMY TUBE CHANGE 12/29/2017  1:47 PM    HISTORY: 57-year-old patient with obstructed gastrojejunostomy feeding  tube.    COMPARISON: November 25, 2017.    TECHNIQUE: Patient was brought to the interventional radiology  department and placed supine on the fluoroscopy table. The existing GJ  tube and surrounding skin were prepped and draped in standard sterile  fashion. Attempt was made to inject contrast through the jejunal limb,  though unsuccessful. Contrast was injected through the gastric limb to  confirm appropriate position within the gastric lumen. Glidewire was  advanced through the gastric port and into the jejunum. The balloon  was deflated and existing tube removed. A new 18 [place]ian TORY  gastrojejunostomy tube was then advanced over the Glidewire until tip  in the jejunum. Balloon was inflated and secured in position. Contrast  was injected through both gastric and jejunal ports to confirm  appropriate position.    Sedation: None.  Fluoroscopic time: 0.9 minutes.  Total fluoroscopic dose: 6.8 mGy.  Contrast: 25 mL of Omnipaque 240 administered into the enteric tract  without complication.  Local anesthetic: None.    FINDINGS: Total of 3 spot fluoroscopic images obtained confirming  appropriate position of existing and new gastrojejunostomy tube.      Impression    IMPRESSION: Uncomplicated exchange of 18 Albanian TORY gastrojejunostomy  tube. The tube is ready for immediate use, as before.    EDER SINHA MD

## 2017-12-30 NOTE — PROGRESS NOTES
Patient on room air, SpO2 low 90's.  Lung sounds coarse with exp. Wheezes.  Congested cough.  Getting scheduled nebs. Will continue to follow.

## 2017-12-30 NOTE — PLAN OF CARE
Problem: Patient Care Overview  Goal: Plan of Care/Patient Progress Review  Outcome: No Change  Alert to self, non verbal, admitted for fever and aspiration pneumonia, up with ceiling lift, on tube feeding goal advanced to 60 cc, incontinent of bowels, has aaron in place, reposition in bed, on duo nebs, will continue to monitor.

## 2017-12-31 VITALS
DIASTOLIC BLOOD PRESSURE: 59 MMHG | WEIGHT: 168 LBS | HEIGHT: 71 IN | RESPIRATION RATE: 20 BRPM | OXYGEN SATURATION: 96 % | SYSTOLIC BLOOD PRESSURE: 99 MMHG | BODY MASS INDEX: 23.52 KG/M2 | TEMPERATURE: 97.8 F | HEART RATE: 86 BPM

## 2017-12-31 LAB
CREAT SERPL-MCNC: 0.72 MG/DL (ref 0.66–1.25)
GFR SERPL CREATININE-BSD FRML MDRD: >90 ML/MIN/1.7M2

## 2017-12-31 PROCEDURE — 94640 AIRWAY INHALATION TREATMENT: CPT | Mod: 76

## 2017-12-31 PROCEDURE — 40000275 ZZH STATISTIC RCP TIME EA 10 MIN

## 2017-12-31 PROCEDURE — 99238 HOSP IP/OBS DSCHRG MGMT 30/<: CPT | Performed by: INTERNAL MEDICINE

## 2017-12-31 PROCEDURE — 25000132 ZZH RX MED GY IP 250 OP 250 PS 637: Mod: GY | Performed by: PHYSICIAN ASSISTANT

## 2017-12-31 PROCEDURE — 25000125 ZZHC RX 250: Performed by: INTERNAL MEDICINE

## 2017-12-31 PROCEDURE — A9270 NON-COVERED ITEM OR SERVICE: HCPCS | Mod: GY | Performed by: PHYSICIAN ASSISTANT

## 2017-12-31 PROCEDURE — 25000132 ZZH RX MED GY IP 250 OP 250 PS 637: Mod: GY | Performed by: INTERNAL MEDICINE

## 2017-12-31 PROCEDURE — A9270 NON-COVERED ITEM OR SERVICE: HCPCS | Mod: GY | Performed by: INTERNAL MEDICINE

## 2017-12-31 PROCEDURE — 27210995 ZZH RX 272: Performed by: PHYSICIAN ASSISTANT

## 2017-12-31 PROCEDURE — 36415 COLL VENOUS BLD VENIPUNCTURE: CPT | Performed by: INTERNAL MEDICINE

## 2017-12-31 PROCEDURE — 82565 ASSAY OF CREATININE: CPT | Performed by: INTERNAL MEDICINE

## 2017-12-31 PROCEDURE — 94640 AIRWAY INHALATION TREATMENT: CPT

## 2017-12-31 RX ADMIN — CARBAMAZEPINE 150 MG: 100 SUSPENSION ORAL at 06:06

## 2017-12-31 RX ADMIN — IPRATROPIUM BROMIDE AND ALBUTEROL SULFATE 3 ML: .5; 3 SOLUTION RESPIRATORY (INHALATION) at 00:24

## 2017-12-31 RX ADMIN — IPRATROPIUM BROMIDE AND ALBUTEROL SULFATE 3 ML: .5; 3 SOLUTION RESPIRATORY (INHALATION) at 11:17

## 2017-12-31 RX ADMIN — CARBAMAZEPINE 150 MG: 100 SUSPENSION ORAL at 10:18

## 2017-12-31 RX ADMIN — LEVOTHYROXINE SODIUM 150 MCG: 150 TABLET ORAL at 06:47

## 2017-12-31 RX ADMIN — PANTOPRAZOLE SODIUM 20 MG: 40 TABLET, DELAYED RELEASE ORAL at 08:19

## 2017-12-31 RX ADMIN — BRIVARACETAM 100 MG: 10 SOLUTION ORAL at 10:18

## 2017-12-31 RX ADMIN — IPRATROPIUM BROMIDE AND ALBUTEROL SULFATE 3 ML: .5; 3 SOLUTION RESPIRATORY (INHALATION) at 07:38

## 2017-12-31 RX ADMIN — HYDROCORTISONE 20 MG: 10 TABLET ORAL at 08:17

## 2017-12-31 RX ADMIN — ASPIRIN 81 MG 81 MG: 81 TABLET ORAL at 08:17

## 2017-12-31 NOTE — PROGRESS NOTES
SW    D: Re-faxed discharge orders to include removing aaron catheter. Fax number for [facility] agency is [phone].

## 2017-12-31 NOTE — PROGRESS NOTES
[facility]    Hospitalist Progress Note    Assessment & Plan   Keyon Farias is a 57 year old male who was admitted on 12/26/2017.     Keyon Farias is a 57-year-old gentleman with past medical history of TBI with right spastic hemiplegia, aphasia, dysphagia, status G-tube placement, seizure disorder, panhypopituitarism and history of recurrent aspiration pneumonia as well as necrotizing pancreatitis who presented to the Emergency Department after an episode of vomiting nith subsequent development of fever and cough with concern for aspiration pneumonia.  T-max 100 degrees Fahrenheit with a tachycardia of 113 beats per minute, all other vitals within normal limits.       Aspiration pneumonitis Vs developing PNA vs viral pneumonitis  Patient with 1 transient episode of vomiting the evening PTA with subsequent development of a fever, T-max of 100.8 degrees Fahrenheit and cough.  History of aspiration pneumonia with pseudomonas lung colonization (Sens to Cipro and Meropenem).  Last hospitalization for this was in 10/2017.  WBC within normal limits.  Procalcitonin 0.05.  Chest x-ray shows right basilar atelectasis.  CMP, UA, influenza negative.  Lactic acid was initially 2.3 but improved to 2.0 after IV fluid hydration resuscitation.  The patient was given a dose of Cipro, meropenem and vancomycin consistent with prior treatment for aspiration pneumonia during last hospitalization in the ER.   -- Given initial procalcitonin 0.05, no fever documented, No leukocytosis and no clear evidence of bacterial infection his Abx were not continued on admit.  -- Repeat Procal unchanged but he has spike a fever of 102.3 12/27,  exam  hypoxic at 87% on RA, tachycardic, with increased work of breathing and coarse BS. Rn reports and episode of vomiting 12/27 am and inability to clear secretions, may have aspirated this am 12/27    -- continue HOB > 35 degrees.   -tube feeds held 12/27, 12/28restarted at 10-15 cc/hr,   nutrition following  -- CXR 12/27  With pulm vasc congestion and mild pulm edema  -received 2 doses of iv lasix 20 mg 12/27-12/28   -- weight is down 75.8<78<77.1  -- 12/27  initiated broad abx coverage with Vanco and Meropenem. Follow Sputum Cx.     -- Blood cultures NGTD  -- UA without signs of infection.  -- Duonebs    -CXR 12/28 with some streaky atelectasis, but no infiltrate  -12/29, afebrile  procalcitonin <0.05m low risk for bacterial infectio  -influenza neg but RSV did return positive  -due to afebrile, low procalcitonin, normal wbc and pos RSV, d/c antibiotics 10/29  -today is afebrile, off supplemental oxygen, plan to discharge back to home  -will call mother who is his primary caregiver      Traumatic brain injury with aphasia, dysphasia, status post G-tube placement and right spastic hemiplegia:    Patient initially presented with a clogged G-tube, but this was resolved after ED treatment.   -clogged again 12/29, replaced by IR without incident, now on full tube feeds      Seizure disorder:    -- Thought due to TBI.  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  Continue PTA hydrocortisone and Synthroid.          History of necrotizing pancreatitis:    Hospitalized in 2/2017.  The patient had a cystogastrostomy tube placed at that time with cultures growing highly resistant pseudomonas and vancomycin resistant enterococcus.  This has been stable.   -- Lipase normal and with a normal abd exam.       Thrombocytopenia  -- Plt's 155 on admit and 107 12/27. Have been low in the past as well during times of infection.   Likely diluted and consumption related to the above.   -- Will follow cbc in am.        FEN  -feeding tube clogged 12/29  - IR replaced 18 Czech TORY gastrojejunostomy tube  -now back on full feeds      DVT Prophylaxis: Pneumatic Compression Devices. Consider Hep Sq if Plt's improve.   Code Status: Full Code    Discharge planned for  today    Jossie Higgins MD    Interval History   Looks good, happy that he is going home    -Data reviewed today: I reviewed all new labs and imaging results over the last 24 hours. I personally reviewed no images or EKG's today.    Physical Exam   Temp: 97.5  F (36.4  C) Temp src: Axillary BP: 112/65 Pulse: 66   Resp: 20 SpO2: 96 % O2 Device: None (Room air)    Vitals:    12/28/17 0028 12/30/17 0540 12/31/17 0045   Weight: 75.8 kg (167 lb) 78 kg (171 lb 14.4 oz) 76.2 kg (168 lb)     Vital Signs with Ranges  Temp:  [97.5  F (36.4  C)-98  F (36.7  C)] 97.5  F (36.4  C)  Pulse:  [66-97] 66  Resp:  [20] 20  BP: ()/(57-72) 112/65  SpO2:  [93 %-96 %] 96 %  I/O last 3 completed shifts:  In: 420 [NG/GT:420]  Out: 1500 [Urine:1500]    Constitutional: alert   Respiratory: clear to auscultation, occasional cough, not on oxygen  Cardiovascular: regular rate and rhythm without murmer or rub   GI:positive bowel sounds, non-tender   Skin/Integumen: no rashes    Other:        Medications     IV fluid REPLACEMENT ONLY         ipratropium - albuterol 0.5 mg/2.5 mg/3 mL  3 mL Nebulization Q4H While awake     aspirin  81 mg Per J Tube Daily     carBAMazepine  150 mg Oral Q6H     fiber modular  1 packet Per Feeding Tube TID     levothyroxine  150 mcg Per J Tube QAM AC     pantoprazole  20 mg Oral BID     hydrocortisone  10 mg Oral QPM     hydrocortisone  20 mg Oral QAM     Brivaracetam  100 mg Oral BID       Data     Recent Labs  Lab 12/31/17  0716 12/30/17  0735 12/29/17  0705 12/28/17  0715 12/27/17  0643 12/26/17  0930   WBC  --  6.4 6.0 5.3 5.8 10.2   HGB  --  12.9* 14.1 13.3 13.5 16.2   MCV  --  90 92 93 93 92   PLT  --  132* 128* 114* 107* 155   NA  --  137 140  --  140 140   POTASSIUM  --  3.4 3.6  --  4.0 3.8   CHLORIDE  --  101 101  --  110* 102   CO2  --  30 33*  --  26 32   BUN  --  10 11  --  14 18   CR 0.72 0.82 1.02 1.11 0.94 1.09   ANIONGAP  --  6 6  --  4 6   STEVE  --  8.4* 8.8  --  8.0* 9.4   GLC  --  90  106*  --  103* 86   ALBUMIN  --   --   --   --   --  3.6   PROTTOTAL  --   --   --   --   --  8.7   BILITOTAL  --   --   --   --   --  0.5   ALKPHOS  --   --   --   --   --  98   ALT  --   --   --   --   --  35   AST  --   --   --   --   --  26   LIPASE  --   --   --   --   --  363       No results found for this or any previous visit (from the past 24 hour(s)).

## 2017-12-31 NOTE — PROGRESS NOTES
Spoke with Pt mother over the phone regarding AVS.  Verbalized understanding.  Paperwork sent with belongings to pts home.

## 2017-12-31 NOTE — PROGRESS NOTES
Pt mother called - pt discharged with galen but mother states pt does not normally have it.  Was told pt had it on admission.  Spoke with Dr. Higgins regarding this matter.  Home care to [facility] when seeing pt.  Spoke with Christa BOLTON - she is to relay this to home care agency.

## 2017-12-31 NOTE — PLAN OF CARE
Problem: Patient Care Overview  Goal: Plan of Care/Patient Progress Review  Outcome: No Change  Pt nonverbal. Reposition q 2 hours. Vital sings stable. Meiplex on coccyx area CDI. Lerma patent. Tube feeding stopped for 1 hour prior and after synthroid. Tube feeding should resume by 0800.  Will continue to monitor

## 2017-12-31 NOTE — PROGRESS NOTES
CHE    D: Spoke to patient's mother, who requested patient be transported via [employer] stretcher due to paraplegia and TBI. Faxed PCS form. Patient will be transported home at 1430. CHE also faxed discharge orders to [facility] to resume home nursing.    P: Continue to follow.

## 2018-01-01 NOTE — DISCHARGE SUMMARY
DATE OF ADMISSION:  12/26/2017   DATE OF DISCHARGE:  12/31/2017       PRIMARY CARE PROVIDER:  [facility]       DISCHARGE DIAGNOSES:   1.  Viral pneumonitis respiratory syncytial virus.   2.  Possible aspiration pneumonitis.   3.  History of traumatic brain injury with aphasia, dysphasia.   4.  History of G-tube placement and spastic right hemiplegia.   5.  Replacement of gastrojejunostomy tube this admission.   6.  History of seizure disorder.   7.  History of panhypopituitarism rhythm.   8.  History of necrotizing pancreatitis in the past.   9.  History of thrombocytopenia.   10.  Chronic tube feeds.      DISCHARGE  MEDICATIONS:    Medications which may have changed:    1.  Levothyroxine 25 mcg/mL.  The patient takes 6 mg or 150 mcg per J tube every morning before breakfast.     Medications which have not changed:   1.  Acetaminophen 60 mg per feeding tube q. 4 hours p.r.n. pain.   2.  Aspirin 10 mg/mL suspension, 81 mg or 8.1 mL per J tube daily.   3.  Bacitracin ointment apply topically for wound care at PEG site   4.  Dulcolax suppository 10 mg rectally daily p.r.n. constipation.     5.  Briviact 10 mg/mL, 100 mg b.i.d.   6.  Calcium carbonate 1250 mg by J tube 3 times daily with meals.     7.  Carbamazepine suspension 150 mg q. 6 hours.     8.  Fiber modular packet 1 packet per feeding tube 3 times daily.   9.  Hydrocortisone 2 mg/mL suspension, takes 10 mL or 20 mg per J tube every morning.     10.  Hydrocortisone 2 mg/mL suspension, 10 mg or 5 mL by J tube every evening.   11.  Melatonin 6 mg by jejunal tube nightly p.r.n. sleep.   12.  Miconazole 2% powder, apply every hour p.r.n.   13.  Multivitamin with mineral liquid 15 mL per J tube daily.   14.  Protonix suspension 20 mg down the J tube daily.   15.  Potassium and sodium phosphorus 1 packet 3 times daily.   16.  Testosterone 200 mg/mL, 100 mg IM q. 3 weeks.   17.  Vitamin D 2000 units by GJ tube daily.      The patient is to follow up with  primary care physician within 7 days for hospital followup.      PENDING TEST RESULTS:  Blood culture 12/26/2017:  Final report is pending, no growth to date.      PERTINENT LABORATORY AND IMAGING STUDIES DURING HOSPITALIZATION:  His creatinine on the day of discharge was 0.72.  His last CBC was from 12/30/2017 with a white count 6.4, hemoglobin 12.9, platelet count 132,000.  On 12/29/2017, he had a procalcitonin which was less than 0.05, or very low risk.  Influenza A and B were negative, but RSV was positive on PCR.  Gastrojejunostomy feeding tube was replaced by Interventional Radiology on 12/29/2017.       Chest x-ray 12/28/2017 showed volumes with streaky bibasilar opacities, likely atelectasis.  No pleural effusions or pneumothorax appreciated.  Stable heart and mediastinum.  Chest x-ray 12/27/2017 showed prominent vascular congestion, mild pulmonary edema, no significant pleural effusion on either side, no pneumothorax.  Chest x-ray 12/26/2017 showed minimal right basilar atelectasis.  No specific evidence of pneumonia.      HOSPITAL COURSE:  Please see dictated history and physical for patient's initial presentation.  Keyon Farias is a 57-year-old male with past medical history of traumatic brain injury with right spastic hemiplegia, aphasia, dysphasia status G-tube placement, seizure disorder, panhypopituitarism and history of recurrent aspiration pneumonia as well as a history of necrotizing pancreatitis, who presented to the emergency room on 12/26/2017 after an episode of vomiting with subsequent development of fever and cough with concern for aspiration pneumonia.  His T-max was 100 degrees Fahrenheit in the emergency room and tachycardia with a rate of 113 beats per minute.  His other vital signs were within normal limits.     1.  RSV viral pneumonitis versus aspiration pneumonitis, most likely related to RSV.  The patient did have an episode of transient vomiting on the evening prior to admission with  subsequent development of fever.  He also has a cough.  He does have a history of aspiration pneumonia with pseudomonas lung colonization sensitive to Cipro and meropenem.  He was last hospitalized for this in 10/2017.  The patient had an initial x-ray which showed a right basal atelectasis.  He had a lactic acid initially elevated at 2.3, but improved to 2.0 after IV hydration and resuscitation.  He was initially given a dose of Cipro, meropenem and vancomycin consistent with prior treatment for aspiration pneumonia during his last hospitalization.  Given his very low initial procalcitonin of 0.05, no leukocytosis and no clear evidence of bacterial infection, his antibiotics were not continued after ER admission.  However, the patient then had an episode of vomiting on 12/27/2017 and inability to clear his secretions, and he may have aspirated.  Because of this, the patient was restarted on vancomycin and meropenem.  He also had a chest x-ray on 12/27/2017 which showed mild pulmonary vascular congestion.  He received 2 doses of Lasix IV 20 mg.  The patient's follow-up chest x-ray showed his pulmonary edema had cleared.  He had no infiltrate.  Blood cultures were done, which showed no growth to date.  The UA showed no signs of infection.  The patient did have influenza A, B and RSV PCR; this returned with RSV positive, but influenza was negative.  The patient was afebrile and a low calcitonin, normal WBC and positive RSV, and his antibiotics were discontinued on 12/29/2017.  The last 2 days, the patient has been afebrile.  He is off of supplemental oxygen, and he is able to be discharged back to home.   2.  History of traumatic brain injury with aphagia, dysphagia status post G-tube placement, and right spastic hemiplegia.  The patient initially had a clogged G-tube, which was resolved after ED treatment; however, it clogged again on 12/29/2017 and was replaced by Interventional Radiology without incident.  He is  now back on his full tube feeds.   3.  History of seizure disorder, thought to be secondary to traumatic brain injury.  He was continued on yntcq-cu-clsyktteb Tegretol and brivaracetam.    4.  History of panhypopituitarism.  The patient was on tmoga-nh-irntupyry hydrocortisone and Synthroid.   5.  History of necrotizing pancreatitis.  The patient had a normal lipase and normal abdominal exam this admission.   6.  History of thrombocytopenia.  The patient has had thrombocytopenia in the past.   7.  Fluids and electrolytes.  The patient's feeding tube clogged on 2017, and Interventional Radiology replaced with an 18 [place] TORY gastrojejunostomy tube without any complications.  The patient is now back on full feeds.   8.  CODE STATUS:  Full code.        DISPOSITION:  The patient is being discharged back to his prior setting, which was with his mother with home nursing.      Time for this discharge:  Approximately 30 minutes.         JULIA CELIS MD             D: 2017 12:36   T: 2017 20:53   MT: [id number]      Name:     BERT BARAJAS   MRN:      [id number]        Account:        [id number]   :      1960           Admit Date:                                       Discharge Date: 2017      Document: [id number]       cc: [facility]

## 2018-01-02 NOTE — PLAN OF CARE
Problem: Patient Care Overview  Goal: Plan of Care/Patient Progress Review  Physical Therapy Discharge Summary    Reason for therapy discharge:    Discharged to home. HHPT was recommended.     Progress towards therapy goal(s). See goals on Care Plan in [facility] electronic health record for goal details.  Goals not met.  Barriers to achieving goals:   discharge from facility.    Therapy recommendation(s):    HHPT was recommended on evaluation

## 2018-01-06 ENCOUNTER — APPOINTMENT (OUTPATIENT)
Dept: CT IMAGING | Facility: CLINIC | Age: 58
End: 2018-01-06
Attending: EMERGENCY MEDICINE
Payer: MEDICARE

## 2018-01-06 ENCOUNTER — HOSPITAL ENCOUNTER (EMERGENCY)
Facility: CLINIC | Age: 58
Discharge: HOME | End: 2018-01-06
Attending: EMERGENCY MEDICINE | Admitting: EMERGENCY MEDICINE
Payer: MEDICARE

## 2018-01-06 ENCOUNTER — APPOINTMENT (OUTPATIENT)
Dept: GENERAL RADIOLOGY | Facility: CLINIC | Age: 58
End: 2018-01-06
Attending: EMERGENCY MEDICINE
Payer: MEDICARE

## 2018-01-06 VITALS
SYSTOLIC BLOOD PRESSURE: 106 MMHG | RESPIRATION RATE: 24 BRPM | TEMPERATURE: 98.9 F | OXYGEN SATURATION: 96 % | HEART RATE: 97 BPM | DIASTOLIC BLOOD PRESSURE: 63 MMHG

## 2018-01-06 DIAGNOSIS — J21.9 BRONCHIOLITIS: ICD-10-CM

## 2018-01-06 DIAGNOSIS — D72.829 LEUKOCYTOSIS, UNSPECIFIED TYPE: ICD-10-CM

## 2018-01-06 DIAGNOSIS — R05.9 COUGH: ICD-10-CM

## 2018-01-06 DIAGNOSIS — K62.89 PROCTITIS: ICD-10-CM

## 2018-01-06 LAB
ALBUMIN UR-MCNC: 30 MG/DL
ANION GAP SERPL CALCULATED.3IONS-SCNC: 8 MMOL/L (ref 3–14)
APPEARANCE UR: CLEAR
BASOPHILS # BLD AUTO: 0.1 10E9/L (ref 0–0.2)
BASOPHILS NFR BLD AUTO: 0.8 %
BILIRUB UR QL STRIP: NEGATIVE
BUN SERPL-MCNC: 19 MG/DL (ref 7–30)
CALCIUM SERPL-MCNC: 8.5 MG/DL (ref 8.5–10.1)
CHLORIDE SERPL-SCNC: 102 MMOL/L (ref 94–109)
CO2 SERPL-SCNC: 30 MMOL/L (ref 20–32)
COLOR UR AUTO: YELLOW
CREAT SERPL-MCNC: 1.04 MG/DL (ref 0.66–1.25)
DIFFERENTIAL METHOD BLD: ABNORMAL
EOSINOPHIL # BLD AUTO: 1.2 10E9/L (ref 0–0.7)
EOSINOPHIL NFR BLD AUTO: 9.8 %
ERYTHROCYTE [DISTWIDTH] IN BLOOD BY AUTOMATED COUNT: 14.1 % (ref 10–15)
FLUAV+FLUBV AG SPEC QL: NEGATIVE
FLUAV+FLUBV AG SPEC QL: NEGATIVE
GFR SERPL CREATININE-BSD FRML MDRD: 74 ML/MIN/1.7M2
GLUCOSE SERPL-MCNC: 95 MG/DL (ref 70–99)
GLUCOSE UR STRIP-MCNC: NEGATIVE MG/DL
HCT VFR BLD AUTO: 39.3 % (ref 40–53)
HGB BLD-MCNC: 13.2 G/DL (ref 13.3–17.7)
HGB UR QL STRIP: NEGATIVE
IMM GRANULOCYTES # BLD: 0.1 10E9/L (ref 0–0.4)
IMM GRANULOCYTES NFR BLD: 0.6 %
KETONES UR STRIP-MCNC: NEGATIVE MG/DL
LACTATE BLD-SCNC: 1.5 MMOL/L (ref 0.7–2)
LEUKOCYTE ESTERASE UR QL STRIP: NEGATIVE
LYMPHOCYTES # BLD AUTO: 3.4 10E9/L (ref 0.8–5.3)
LYMPHOCYTES NFR BLD AUTO: 28.6 %
MCH RBC QN AUTO: 31.2 PG (ref 26.5–33)
MCHC RBC AUTO-ENTMCNC: 33.6 G/DL (ref 31.5–36.5)
MCV RBC AUTO: 93 FL (ref 78–100)
MONOCYTES # BLD AUTO: 1.2 10E9/L (ref 0–1.3)
MONOCYTES NFR BLD AUTO: 10.3 %
NEUTROPHILS # BLD AUTO: 5.9 10E9/L (ref 1.6–8.3)
NEUTROPHILS NFR BLD AUTO: 49.9 %
NITRATE UR QL: NEGATIVE
NRBC # BLD AUTO: 0 10*3/UL
NRBC BLD AUTO-RTO: 0 /100
PH UR STRIP: 8 PH (ref 5–7)
PLATELET # BLD AUTO: 190 10E9/L (ref 150–450)
POTASSIUM SERPL-SCNC: 4 MMOL/L (ref 3.4–5.3)
RBC # BLD AUTO: 4.23 10E12/L (ref 4.4–5.9)
RBC #/AREA URNS AUTO: 2 /HPF (ref 0–2)
SODIUM SERPL-SCNC: 140 MMOL/L (ref 133–144)
SOURCE: ABNORMAL
SP GR UR STRIP: 1.02 (ref 1–1.03)
SPECIMEN SOURCE: NORMAL
UROBILINOGEN UR STRIP-MCNC: 2 MG/DL (ref 0–2)
WBC # BLD AUTO: 11.8 10E9/L (ref 4–11)
WBC #/AREA URNS AUTO: 2 /HPF (ref 0–2)

## 2018-01-06 PROCEDURE — 81001 URINALYSIS AUTO W/SCOPE: CPT | Performed by: EMERGENCY MEDICINE

## 2018-01-06 PROCEDURE — 96361 HYDRATE IV INFUSION ADD-ON: CPT

## 2018-01-06 PROCEDURE — 85025 COMPLETE CBC W/AUTO DIFF WBC: CPT | Performed by: EMERGENCY MEDICINE

## 2018-01-06 PROCEDURE — 25000128 H RX IP 250 OP 636: Performed by: EMERGENCY MEDICINE

## 2018-01-06 PROCEDURE — 80048 BASIC METABOLIC PNL TOTAL CA: CPT | Performed by: EMERGENCY MEDICINE

## 2018-01-06 PROCEDURE — 96360 HYDRATION IV INFUSION INIT: CPT | Mod: 59

## 2018-01-06 PROCEDURE — 94640 AIRWAY INHALATION TREATMENT: CPT

## 2018-01-06 PROCEDURE — 99285 EMERGENCY DEPT VISIT HI MDM: CPT | Mod: 25

## 2018-01-06 PROCEDURE — 25000125 ZZHC RX 250: Performed by: EMERGENCY MEDICINE

## 2018-01-06 PROCEDURE — 71046 X-RAY EXAM CHEST 2 VIEWS: CPT

## 2018-01-06 PROCEDURE — 74177 CT ABD & PELVIS W/CONTRAST: CPT

## 2018-01-06 PROCEDURE — 83605 ASSAY OF LACTIC ACID: CPT | Performed by: EMERGENCY MEDICINE

## 2018-01-06 PROCEDURE — 87804 INFLUENZA ASSAY W/OPTIC: CPT | Performed by: EMERGENCY MEDICINE

## 2018-01-06 RX ORDER — IOPAMIDOL 755 MG/ML
84 INJECTION, SOLUTION INTRAVASCULAR ONCE
Status: COMPLETED | OUTPATIENT
Start: 2018-01-06 | End: 2018-01-06

## 2018-01-06 RX ORDER — IPRATROPIUM BROMIDE AND ALBUTEROL SULFATE 2.5; .5 MG/3ML; MG/3ML
3 SOLUTION RESPIRATORY (INHALATION) ONCE
Status: COMPLETED | OUTPATIENT
Start: 2018-01-06 | End: 2018-01-06

## 2018-01-06 RX ORDER — ALBUTEROL SULFATE 0.83 MG/ML
1 SOLUTION RESPIRATORY (INHALATION) EVERY 4 HOURS PRN
Qty: 1 BOX | Refills: 0 | Status: SHIPPED | OUTPATIENT
Start: 2018-01-06 | End: 2019-01-14

## 2018-01-06 RX ADMIN — SODIUM CHLORIDE 66 ML: 9 INJECTION, SOLUTION INTRAVENOUS at 13:08

## 2018-01-06 RX ADMIN — IOPAMIDOL 84 ML: 755 INJECTION, SOLUTION INTRAVENOUS at 13:04

## 2018-01-06 RX ADMIN — SODIUM CHLORIDE 1000 ML: 9 INJECTION, SOLUTION INTRAVENOUS at 11:29

## 2018-01-06 RX ADMIN — IPRATROPIUM BROMIDE AND ALBUTEROL SULFATE 3 ML: .5; 3 SOLUTION RESPIRATORY (INHALATION) at 13:45

## 2018-01-06 NOTE — ED PROVIDER NOTES
History     Chief Complaint:  Cough     HPI Limited due to patient mental status and provided by mother with whom patient lives   Keyon Farias is a 57 year old male who presents with cough and dry mouth.  Patient with history of traumatic brain injury, and G tube for frequent aspiration who has vomited several times in the last 5 days who now has increasing cough and temperature pre mom.  Patient with recent admission for RSV pneumonitis and still recovering from that.  Patient mom states some improvement but last few days continued cough and difficulty sleeping.  Tolerates some tube feeds but other times vomits.  Mom sick with similar symptoms.  No complaints of pain to mom or me on examination.      Allergies:  Dilantin [Phenytoin Sodium]  Valproic Acid     Medications:    Testosterone   Carbamazepine  brivaracetam  Levothyroxine   Pantoprazole  potassium and Na phosphate  Vitamin D  Aspirin  Calcium carbonate  Melatonin     Past Medical History:    Recurrent seizures  Panhypopituitarism   TBI with latent aphasia  CVA  DVT upper extremity  GERD  Thyroid disease   Hx v-fib / v-tach  Hx necrotizing pancreatitis, 1/23/17  Hx aspiration pneumonia, 6/5/17  Hx acute respiratory failure with hypoxia, 11/26/16  Hx cardiac arrest, 11/26/16  Hx septic shock 1/2016  MRSA  VRE    Past Surgical History:    Tracheostomy placement  G-tube insertion  Appendectomy     Family History:    History reviewed. No pertinent family history.      Social History:  Former smoker, quit 1989. Non-drinker.  Marital Status:  Single [1]       Review of Systems  Resp: cough  General:moaning and not sleeping  GI: vomiting  Remainder of symptoms reviewed and negative.     Physical Exam     Patient Vitals for the past 24 hrs:   BP Temp Temp src Pulse Resp SpO2   01/06/18 1231 - - - - - 94 %   01/06/18 1220 - - - - - 95 %   01/06/18 1215 - - - - - 91 %   01/06/18 1211 - - - - - 91 %   01/06/18 1210 - - - - - 95 %   01/06/18 1209 124/68 - - - - (!)  87 %   01/06/18 1141 - - - - - 94 %   01/06/18 1134 - 98.9  F (37.2  C) Axillary - - -   01/06/18 1131 - 98.2  F (36.8  C) Oral - - -   01/06/18 1125 - - - - - 93 %   01/06/18 1103 127/68 99.2  F (37.3  C) Tympanic 97 24 92 %   01/06/18 1102 - - - - - 96 %        Physical Exam  General: Patient is alert and gives yes/no answers, lots of moaning and course cough  HEENT: Head atraumatic    Eyes: pupils equal and reactive. Conjunctiva clear   Nares: patent   Oropharynx: no lesions, uvula midline, no palatal draping, normal voice, no trismus  Neck: Supple without lymphadenopathy, no meningismus  Chest: Heart regular rate and rhythm.   Lungs:rales bilaterally with equal air exchange  Abdomen: Soft, non tender, nondistended, normal bowel sounds  Back: No costovertebral angle tenderness, no midline C, T or L spine tenderness  Neuro: Grossly nonfocal, normal speech, strength equal bilaterally, CN 2-12 intact  Extremities: No deformities, equal radial and DP pulses. No clubbing, cyanosis.  No edema  Skin: Warm and dry with no rash.       Emergency Department Course   Imaging:  Radiographic findings were communicated with the patient who voiced understanding of the findings.  XR Chest 2 views:   Shallow inspiration. Streaky basilar opacities consistent with atelectasis. No significant change since 12/28/2017.     Results per Radiology.      CT Abdomen/Pelvis w/ contrast:    1. Circumferential bowel wall thickening in the rectum is new since the previous exam. This finding could represent an infectious or inflammatory proctitis, and clinical correlation is recommended. Rectal neoplasm cannot be excluded from this appearance.  2. Hazy interstitial and groundglass opacities at both lung bases posteriorly are nonspecific, but could be infectious in etiology. Results per Radiology.      Laboratory:  Laboratory findings were communicated with the patient who voiced understanding of the findings.  CBC w/diff: WBC 11.8 (H), RBC 4.23  (L), Hgb 13.2 (L), Hct 39.3 (L), AEC 1.2 (H), o/w WNL (Plt 190)  BMP: WNL (Cr 1.04)  Influenza A/B Antigen: negative    Lactic acid (1053): 1.5 (WNL)     Interventions:  1129: Normal Saline 0.9% 1L, IV   1345: ipratropium - albuterol 0.5mg/2.5mg/3ml (Duoneb), neb     Emergency Department Course:  Past medical records, nursing notes, and vitals reviewed.   1102: I performed an exam of the patient as documented above.   Contact isolation precautions taken.    Peripheral IV access established. Blood drawn and sent. The above EKG, imaging study, and labs were obtained. Results above.  The patient was given the above interventions with improvement.    I rechecked patient. Clinical findings and plan explained to the Patient. Patient discharged home with instructions regarding supportive care, medications, and reasons to return as well as the importance of close follow-up were reviewed.         Impression & Plan      Medical Decision Making:  Keyon Farias is a 57 year old male with continued cough since discharge one week ago. No ongoing fever.  Patient with difficulty sleeping due to cough.  Patient improved with duoneb in ED.  Patient's chest X-ray with bibasilar opacities likely related to atelectasis.  On CT of abdomen done due to recent vomiting the ground glass basilar opacities are improved on right and stable on the left.  Patient with no true fever noted here.  There is some elevation in his WBC.  No UTI.  Do not suspect new aspiration pneumonia based on imaging at this time.  Patient mother understands this may be early in the disease process and if he gets worse he will need to return for antibiotics and admission. Return precautions reviewed. .  CT did show proctitis.  Recommend outpatient colonoscopy and GI evaluation with mother is aware of.  Patient with no diarrhea in ED.  Large BM today prior to arrival per mom.  Patient discharged with neb machine and albuterol to use as needed at home.      Diagnosis:     ICD-10-CM    1. Cough R05    2. Leukocytosis, unspecified type D72.829    3. Bronchiolitis J21.9        Disposition:   discharged to home     Scribe Disclosure:  I, Giancarlo Avila, am serving as a scribe at 11:03 AM on 1/6/2018 to document services personally performed by Tamara Mcwilliams MD based on my observations and the provider's statements to me.    Giancarlo Avila  1/6/2018   EMERGENCY DEPARTMENT      Tamara Mcwilliams MD  01/06/18 [id number]

## 2018-01-06 NOTE — DISCHARGE INSTRUCTIONS
REturn to ER for increasing fever, shortness of breath or cough.    You need to follow up with GI for thickened rectum for outpatient colonoscopy.

## 2018-01-06 NOTE — ED NOTES
Bed: ED06  Expected date:   Expected time:   Means of arrival:   Comments:  Kerri 1 to rm 6   Cough  5 min   yellow

## 2018-01-06 NOTE — ED NOTES
Patients mother verbalized understanding of using the nebulizer machine after a teaching demonstration.

## 2018-01-06 NOTE — ED AVS SNAPSHOT
Emergency Department    [address] [facility] [address]    Phone:  [phone]    Fax:  [phone]                                       Keyon Farias   MRN: [id number]    Department:   Emergency Department   Date of Visit:  1/6/2018           Patient Information     Date Of Birth          1960        Your diagnoses for this visit were:     Cough     Leukocytosis, unspecified type     Bronchiolitis     Proctitis        You were seen by Tamara Mcwilliams MD.      Follow-up Information     Follow up with [facility]    Contact information:    [address] [place] [address] [phone]          Follow up with  Emergency Department.    Specialty:  EMERGENCY MEDICINE    Why:  If symptoms worsen    Contact information:    [address] [facility] [address] [phone]        Follow up with Beny Davis MD.    Specialty:  Gastroenterology    Contact information:    [facility]  [address] [place] [address] [address] [place] [address] [phone]          Discharge Instructions       REturn to ER for increasing fever, shortness of breath or cough.    You need to follow up with GI for thickened rectum for outpatient colonoscopy.    Discharge References/Attachments     VIRAL RESPIRATORY ILLNESS IN CHILDREN, TREATING (ENGLISH)      24 Hour Appointment Hotline       To make an appointment at any [facility], call [phone] ([phone]). If you don't have a family doctor or clinic, we will help you find one. [facility] clinics are conveniently located to serve the needs of you and your family.             Review of your medicines      Our records show that you are taking the medicines listed below. If these are incorrect, please call your family doctor or clinic.        Dose / Directions Last dose taken    ACETAMINOPHEN PO   Dose:  650 mg        650 mg by Per Feeding Tube route every 4 hours as needed for pain   Refills:  0         aspirin 10 mg/mL Susp   Dose:  81 mg        8.1 mLs (81 mg) by Per J Tube route daily   Refills:  0        bacitracin ointment        Apply topically daily as needed for wound care To PEG site.   Refills:  0        bisacodyl 10 MG Suppository   Commonly known as:  DULCOLAX   Dose:  10 mg   Quantity:  30 suppository        Place 1 suppository (10 mg) rectally daily as needed for constipation   Refills:  0        BRIVIACT 10 MG/ML solution   Dose:  100 mg   Generic drug:  Brivaracetam        Take 100 mg by mouth 2 times daily   Refills:  0        calcium carbonate 1250 (500 CA) MG/5ML Susp suspension   Dose:  1250 mg   Quantity:  450 mL        5 mLs (1,250 mg) by Per J Tube route 3 times daily (with meals)   Refills:  0        carBAMazepine 100 MG/5ML suspension   Commonly known as:  TEGretol   Dose:  150 mg        Take 150 mg by mouth every 6 hours   Refills:  0        fiber modular packet   Dose:  1 packet        1 packet by Per Feeding Tube route 3 times daily   Refills:  0        * hydrocortisone 2 mg/mL Susp   Commonly known as:  CORTEF   Dose:  20 mg        10 mLs (20 mg) by Per J Tube route every morning   Refills:  0        * hydrocortisone 2 mg/mL Susp   Commonly known as:  CORTEF   Dose:  10 mg        Take 5 mLs (10 mg) by mouth every evening   Refills:  0        levothyroxine 25 mcg/mL Susp   Commonly known as:  SYNTHROID   Dose:  150 mcg        6 mLs (150 mcg) by Per J Tube route every morning (before breakfast)   Refills:  0        melatonin 1 MG/ML Liqd liquid   Dose:  6 mg        6 mg by Jejunal Tube route nightly as needed for sleep   Refills:  0        miconazole 2 % powder   Commonly known as:  MICATIN; MICRO GUARD        Apply topically every hour as needed for other (topical candidiasis)   Refills:  0        multivitamins with minerals Liqd liquid   Dose:  15 mL        15 mLs by Per J Tube route daily   Refills:  0        pantoprazole Susp suspension   Commonly known as:  PROTONIX   Dose:  20 mg         Take 20 mg by mouth 2 times daily   Refills:  0        potassium & sodium phosphates 280-160-250 MG Packet   Commonly known as:  NEUTRA-PHOS   Dose:  1 packet        Take 1 packet by mouth 3 times daily 0900, 1500, 2100.   Refills:  0        testosterone cypionate 200 MG/ML injection   Commonly known as:  DEPOTESTOTERONE   Dose:  100 mg        Inject 100 mg into the muscle See Admin Instructions Every three weeks. Unknown due date.   Refills:  0        VITAMIN D (CHOLECALCIFEROL) PO   Dose:  2000 Units        Take 2,000 Units by mouth daily   Refills:  0        * Notice:  This list has 2 medication(s) that are the same as other medications prescribed for you. Read the directions carefully, and ask your doctor or other care provider to review them with you.            Procedures and tests performed during your visit     Basic metabolic panel    CBC with platelets + differential    CT Abdomen Pelvis w Contrast    Chest XR,  PA & LAT    Influenza A/B antigen    Lactic acid    UA reflex to Microscopic and Culture      Orders Needing Specimen Collection     None      Pending Results     No orders found from 1/4/2018 to 1/7/2018.            Pending Culture Results     No orders found from 1/4/2018 to 1/7/2018.            Pending Results Instructions     If you had any lab results that were not finalized at the time of your Discharge, you can call the ED Lab Result RN at [phone]. You will be contacted by this team for any positive Lab results or changes in treatment. The nurses are available 7 days a week from 10A to 6:30P.  You can leave a message 24 hours per day and they will return your call.        Test Results From Your Hospital Stay        1/6/2018  1:39 PM      Narrative     XR CHEST 2 VW   1/6/2018 11:55 AM     HISTORY: cough;     COMPARISON: Film dated 12/28/2017    FINDINGS: The heart is negative.  Patient is taking a shallow  inspiration. There are streaky basilar opacities. These are probably  due to  atelectasis. Patient had a similar finding on 12/28/2017. The  pulmonary vasculature is normal.  The bones and soft tissues are  unremarkable.        Impression     IMPRESSION: Shallow inspiration. Streaky basilar opacities consistent  with atelectasis. No significant change since 12/28/2017.        ASHOK MCBRIDE MD         1/6/2018 11:31 AM      Component Results     Component Value Ref Range & Units Status    Lactic Acid 1.5 0.7 - 2.0 mmol/L Final         1/6/2018 11:30 AM      Component Results     Component Value Ref Range & Units Status    WBC 11.8 (H) 4.0 - 11.0 10e9/L Final    RBC Count 4.23 (L) 4.4 - 5.9 10e12/L Final    Hemoglobin 13.2 (L) 13.3 - 17.7 g/dL Final    Hematocrit 39.3 (L) 40.0 - 53.0 % Final    MCV 93 78 - 100 fl Final    MCH 31.2 26.5 - 33.0 pg Final    MCHC 33.6 31.5 - 36.5 g/dL Final    RDW 14.1 10.0 - 15.0 % Final    Platelet Count 190 150 - 450 10e9/L Final    Diff Method Automated Method  Final    % Neutrophils 49.9 % Final    % Lymphocytes 28.6 % Final    % Monocytes 10.3 % Final    % Eosinophils 9.8 % Final    % Basophils 0.8 % Final    % Immature Granulocytes 0.6 % Final    Nucleated RBCs 0 0 /100 Final    Absolute Neutrophil 5.9 1.6 - 8.3 10e9/L Final    Absolute Lymphocytes 3.4 0.8 - 5.3 10e9/L Final    Absolute Monocytes 1.2 0.0 - 1.3 10e9/L Final    Absolute Eosinophils 1.2 (H) 0.0 - 0.7 10e9/L Final    Absolute Basophils 0.1 0.0 - 0.2 10e9/L Final    Abs Immature Granulocytes 0.1 0 - 0.4 10e9/L Final    Absolute Nucleated RBC 0.0  Final         1/6/2018 11:53 AM      Component Results     Component Value Ref Range & Units Status    Sodium 140 133 - 144 mmol/L Final    Potassium 4.0 3.4 - 5.3 mmol/L Final    Chloride 102 94 - 109 mmol/L Final    Carbon Dioxide 30 20 - 32 mmol/L Final    Anion Gap 8 3 - 14 mmol/L Final    Glucose 95 70 - 99 mg/dL Final    Urea Nitrogen 19 7 - 30 mg/dL Final    Creatinine 1.04 0.66 - 1.25 mg/dL Final    GFR Estimate 74 >60 mL/min/1.7m2 Final    Non   GFR Calc    GFR Estimate If Black 90 >60 mL/min/1.7m2 Final    African American GFR Calc    Calcium 8.5 8.5 - 10.1 mg/dL Final         1/6/2018 11:32 AM      Component Results     Component Value Ref Range & Units Status    Influenza A/B Agn Specimen Left Nares  Final    Influenza A Negative NEG^Negative Final    Influenza B Negative NEG^Negative Final    Test results must be correlated with clinical data. If necessary, results   should be confirmed by a molecular assay or viral culture.           1/6/2018  1:44 PM      Narrative     CT ABDOMEN AND PELVIS WITH CONTRAST   1/6/2018 1:10 PM     HISTORY: Abdominal pain. Leukocytosis.    TECHNIQUE:  84mL Isovue-370 IV were administered. After contrast  administration, volumetric helical sections were acquired from the  lung bases to the ischial tuberosities. Coronal images were also  reconstructed. Radiation dose for this scan was reduced using  automated exposure control, adjustment of the mA and/or kV according  to patient size, or iterative reconstruction technique.    COMPARISON: CT of the abdomen and pelvis performed 10/3/2017.     FINDINGS: There is circumferential bowel wall thickening in the rectum  (series 2 image 94). Few scattered colonic diverticula are noted,  without convincing evidence for diverticulitis.  Tubing is again noted  within the ascending colon, likely representing a migrated  cystogastrostomy tube. Gastrojejunostomy tube is in place. No bowel  obstruction.  No free fluid in the pelvis. Mild atherosclerotic  aortoiliac calcification. The liver, gallbladder, spleen, adrenal  glands, and kidneys are unremarkable. No hydronephrosis.  Hazy  interstitial and groundglass opacities at both lung bases posteriorly  have a similar appearance on the left, but are improved on the right.        Impression     IMPRESSION:   1. Circumferential bowel wall thickening in the rectum is new since  the previous exam. This finding could represent an  infectious or  inflammatory proctitis, and clinical correlation is recommended.  Rectal neoplasm cannot be excluded from this appearance.  2. Hazy interstitial and groundglass opacities at both lung bases  posteriorly are nonspecific, but could be infectious in etiology.    LEANDRA RIVERA MD         1/6/2018 12:37 PM      Component Results     Component Value Ref Range & Units Status    Color Urine Yellow  Final    Appearance Urine Clear  Final    Glucose Urine Negative NEG^Negative mg/dL Final    Bilirubin Urine Negative NEG^Negative Final    Ketones Urine Negative NEG^Negative mg/dL Final    Specific Gravity Urine 1.017 1.003 - 1.035 Final    Blood Urine Negative NEG^Negative Final    pH Urine 8.0 (H) 5.0 - 7.0 pH Final    Protein Albumin Urine 30 (A) NEG^Negative mg/dL Final    Urobilinogen mg/dL 2.0 0.0 - 2.0 mg/dL Final    Nitrite Urine Negative NEG^Negative Final    Leukocyte Esterase Urine Negative NEG^Negative Final    Source Catheterized Urine  Final    RBC Urine 2 0 - 2 /HPF Final    WBC Urine 2 0 - 2 /HPF Final                Clinical Quality Measure: Blood Pressure Screening     Your blood pressure was checked while you were in the emergency department today. The last reading we obtained was  BP: 124/68 . Please read the guidelines below about what these numbers mean and what you should do about them.  If your systolic blood pressure (the top number) is less than 120 and your diastolic blood pressure (the bottom number) is less than 80, then your blood pressure is normal. There is nothing more that you need to do about it.  If your systolic blood pressure (the top number) is 120-139 or your diastolic blood pressure (the bottom number) is 80-89, your blood pressure may be higher than it should be. You should have your blood pressure rechecked within a year by a primary care provider.  If your systolic blood pressure (the top number) is 140 or greater or your diastolic blood pressure (the bottom number)  "is 90 or greater, you may have high blood pressure. High blood pressure is treatable, but if left untreated over time it can put you at risk for heart attack, stroke, or kidney failure. You should have your blood pressure rechecked by a primary care provider within the next 4 weeks.  If your provider in the emergency department today gave you specific instructions to follow-up with your doctor or provider even sooner than that, you should follow that instruction and not wait for up to 4 weeks for your follow-up visit.        Thank you for choosing [facility]       Thank you for choosing [facility] for your care. Our goal is always to provide you with excellent care. Hearing back from our patients is one way we can continue to improve our services. Please take a few minutes to complete the written survey that you may receive in the mail after you visit with us. Thank you!        [employer] Information     [employer] lets you send messages to your doctor, view your test results, renew your prescriptions, schedule appointments and more. To sign up, go to [email or web address] . Click on \"Log in\" on the left side of the screen, which will take you to the Welcome page. Then click on \"Sign up Now\" on the right side of the page.     You will be asked to enter the access code listed below, as well as some personal information. Please follow the directions to create your username and password.     Your access code is: [email or web address]  Expires: 3/11/2018 12:43 PM     Your access code will  in 90 days. If you need help or a new code, please call your [facility] clinic or [phone].        Care EveryWhere ID     This is your Care EveryWhere ID. This could be used by other organizations to access your [facility] medical records  [id number]        Equal Access to Services     BRENDA LAO : krishna Haynes qaybta kaalmada adeegyada, waxay idiin hayaan adeeg kharash la'aan ah. So watiffanie " [phone].    ATENCIÓN: Si habla español, tiene a king disposición servicios gratuitos de asistencia lingüística. Llame al [phone].    We comply with applicable federal civil rights laws and [place] laws. We do not discriminate on the basis of race, color, national origin, age, disability, sex, sexual orientation, or gender identity.            After Visit Summary       This is your record. Keep this with you and show to your community pharmacist(s) and doctor(s) at your next visit.

## 2018-01-06 NOTE — ED AVS SNAPSHOT
Emergency Department    [address] [place] [address]    Phone:  [phone]    Fax:  [phone]                                       Keyon Farias   MRN: [id number]    Department:   Emergency Department   Date of Visit:  1/6/2018           After Visit Summary Signature Page     I have received my discharge instructions, and my questions have been answered. I have discussed any challenges I see with this plan with the nurse or doctor.    ..........................................................................................................................................  Patient/Patient Representative Signature      ..........................................................................................................................................  Patient Representative Print Name and Relationship to Patient    ..................................................               ................................................  Date                                            Time    ..........................................................................................................................................  Reviewed by Signature/Title    ...................................................              ..............................................  Date                                                            Time

## 2018-02-08 ENCOUNTER — HOSPITAL ENCOUNTER (OUTPATIENT)
Facility: CLINIC | Age: 58
End: 2018-02-08
Admitting: RADIOLOGY
Payer: MEDICAID

## 2018-02-14 ENCOUNTER — APPOINTMENT (OUTPATIENT)
Dept: GENERAL RADIOLOGY | Facility: CLINIC | Age: 58
DRG: 871 | End: 2018-02-14
Attending: EMERGENCY MEDICINE
Payer: MEDICARE

## 2018-02-14 ENCOUNTER — HOSPITAL ENCOUNTER (INPATIENT)
Facility: CLINIC | Age: 58
LOS: 7 days | Discharge: HOME HEALTH | DRG: 871 | End: 2018-02-21
Attending: EMERGENCY MEDICINE | Admitting: ANESTHESIOLOGY
Payer: MEDICARE

## 2018-02-14 DIAGNOSIS — B37.2 CANDIDIASIS OF SKIN: Primary | ICD-10-CM

## 2018-02-14 DIAGNOSIS — A41.9 SEPTIC SHOCK (H): ICD-10-CM

## 2018-02-14 DIAGNOSIS — J18.9 PNEUMONIA OF RIGHT LOWER LOBE DUE TO INFECTIOUS ORGANISM: ICD-10-CM

## 2018-02-14 DIAGNOSIS — R65.21 SEPTIC SHOCK (H): ICD-10-CM

## 2018-02-14 LAB
ALBUMIN SERPL-MCNC: 2.3 G/DL (ref 3.4–5)
ALBUMIN SERPL-MCNC: 3.3 G/DL (ref 3.4–5)
ALBUMIN UR-MCNC: 10 MG/DL
ALP SERPL-CCNC: 66 U/L (ref 40–150)
ALP SERPL-CCNC: 98 U/L (ref 40–150)
ALT SERPL W P-5'-P-CCNC: 25 U/L (ref 0–70)
ALT SERPL W P-5'-P-CCNC: 36 U/L (ref 0–70)
ANION GAP SERPL CALCULATED.3IONS-SCNC: 7 MMOL/L (ref 3–14)
ANION GAP SERPL CALCULATED.3IONS-SCNC: 7 MMOL/L (ref 3–14)
APPEARANCE UR: CLEAR
AST SERPL W P-5'-P-CCNC: 18 U/L (ref 0–45)
AST SERPL W P-5'-P-CCNC: 33 U/L (ref 0–45)
BASE DEFICIT BLDA-SCNC: 0.8 MMOL/L
BASE EXCESS BLDV CALC-SCNC: 0 MMOL/L
BASE EXCESS BLDV CALC-SCNC: 3.1 MMOL/L
BASOPHILS # BLD AUTO: 0.1 10E9/L (ref 0–0.2)
BASOPHILS NFR BLD AUTO: 0.3 %
BILIRUB SERPL-MCNC: 0.5 MG/DL (ref 0.2–1.3)
BILIRUB SERPL-MCNC: 0.6 MG/DL (ref 0.2–1.3)
BILIRUB UR QL STRIP: NEGATIVE
BUN SERPL-MCNC: 16 MG/DL (ref 7–30)
BUN SERPL-MCNC: 20 MG/DL (ref 7–30)
CA-I BLD-MCNC: 4 MG/DL (ref 4.4–5.2)
CA-I BLD-MCNC: 4.3 MG/DL (ref 4.4–5.2)
CALCIUM SERPL-MCNC: 7.2 MG/DL (ref 8.5–10.1)
CALCIUM SERPL-MCNC: 8.8 MG/DL (ref 8.5–10.1)
CHLORIDE SERPL-SCNC: 100 MMOL/L (ref 94–109)
CHLORIDE SERPL-SCNC: 110 MMOL/L (ref 94–109)
CO2 SERPL-SCNC: 25 MMOL/L (ref 20–32)
CO2 SERPL-SCNC: 31 MMOL/L (ref 20–32)
COLOR UR AUTO: YELLOW
CREAT SERPL-MCNC: 1.09 MG/DL (ref 0.66–1.25)
CREAT SERPL-MCNC: 1.16 MG/DL (ref 0.66–1.25)
DIFFERENTIAL METHOD BLD: ABNORMAL
EOSINOPHIL # BLD AUTO: 0.7 10E9/L (ref 0–0.7)
EOSINOPHIL NFR BLD AUTO: 4.5 %
ERYTHROCYTE [DISTWIDTH] IN BLOOD BY AUTOMATED COUNT: 13.1 % (ref 10–15)
ERYTHROCYTE [DISTWIDTH] IN BLOOD BY AUTOMATED COUNT: 13.6 % (ref 10–15)
FLUAV+FLUBV AG SPEC QL: NEGATIVE
FLUAV+FLUBV AG SPEC QL: NEGATIVE
GFR SERPL CREATININE-BSD FRML MDRD: 65 ML/MIN/1.7M2
GFR SERPL CREATININE-BSD FRML MDRD: 70 ML/MIN/1.7M2
GLUCOSE BLDC GLUCOMTR-MCNC: 104 MG/DL (ref 70–99)
GLUCOSE BLDC GLUCOMTR-MCNC: 108 MG/DL (ref 70–99)
GLUCOSE SERPL-MCNC: 116 MG/DL (ref 70–99)
GLUCOSE SERPL-MCNC: 95 MG/DL (ref 70–99)
GLUCOSE UR STRIP-MCNC: NEGATIVE MG/DL
HCO3 BLD-SCNC: 24 MMOL/L (ref 21–28)
HCO3 BLDV-SCNC: 25 MMOL/L (ref 21–28)
HCO3 BLDV-SCNC: 28 MMOL/L (ref 21–28)
HCT VFR BLD AUTO: 35.9 % (ref 40–53)
HCT VFR BLD AUTO: 46.3 % (ref 40–53)
HGB BLD-MCNC: 12 G/DL (ref 13.3–17.7)
HGB BLD-MCNC: 16 G/DL (ref 13.3–17.7)
HGB UR QL STRIP: NEGATIVE
IMM GRANULOCYTES # BLD: 0.1 10E9/L (ref 0–0.4)
IMM GRANULOCYTES NFR BLD: 0.3 %
INTERPRETATION ECG - MUSE: NORMAL
KETONES UR STRIP-MCNC: NEGATIVE MG/DL
LACTATE BLD-SCNC: 2 MMOL/L (ref 0.7–2)
LACTATE BLD-SCNC: 2.8 MMOL/L (ref 0.7–2)
LACTATE BLD-SCNC: 3.4 MMOL/L (ref 0.7–2)
LACTATE BLD-SCNC: 4.1 MMOL/L (ref 0.7–2)
LEUKOCYTE ESTERASE UR QL STRIP: ABNORMAL
LYMPHOCYTES # BLD AUTO: 3 10E9/L (ref 0.8–5.3)
LYMPHOCYTES NFR BLD AUTO: 20.6 %
MCH RBC QN AUTO: 30.8 PG (ref 26.5–33)
MCH RBC QN AUTO: 31.4 PG (ref 26.5–33)
MCHC RBC AUTO-ENTMCNC: 33.4 G/DL (ref 31.5–36.5)
MCHC RBC AUTO-ENTMCNC: 34.6 G/DL (ref 31.5–36.5)
MCV RBC AUTO: 91 FL (ref 78–100)
MCV RBC AUTO: 92 FL (ref 78–100)
MONOCYTES # BLD AUTO: 1 10E9/L (ref 0–1.3)
MONOCYTES NFR BLD AUTO: 6.6 %
NEUTROPHILS # BLD AUTO: 10 10E9/L (ref 1.6–8.3)
NEUTROPHILS NFR BLD AUTO: 67.7 %
NITRATE UR QL: NEGATIVE
NRBC # BLD AUTO: 0 10*3/UL
NRBC BLD AUTO-RTO: 0 /100
O2/TOTAL GAS SETTING VFR VENT: NORMAL %
OXYHGB MFR BLD: 96 % (ref 92–100)
OXYHGB MFR BLDV: 74 %
PCO2 BLD: 38 MM HG (ref 35–45)
PCO2 BLDV: 41 MM HG (ref 40–50)
PCO2 BLDV: 42 MM HG (ref 40–50)
PH BLD: 7.4 PH (ref 7.35–7.45)
PH BLDV: 7.38 PH (ref 7.32–7.43)
PH BLDV: 7.44 PH (ref 7.32–7.43)
PH UR STRIP: 5.5 PH (ref 5–7)
PLATELET # BLD AUTO: 111 10E9/L (ref 150–450)
PLATELET # BLD AUTO: 140 10E9/L (ref 150–450)
PO2 BLD: 87 MM HG (ref 80–105)
PO2 BLDV: 40 MM HG (ref 25–47)
PO2 BLDV: 56 MM HG (ref 25–47)
POTASSIUM SERPL-SCNC: 3.8 MMOL/L (ref 3.4–5.3)
POTASSIUM SERPL-SCNC: 3.9 MMOL/L (ref 3.4–5.3)
POTASSIUM SERPL-SCNC: 4.1 MMOL/L (ref 3.4–5.3)
PROT SERPL-MCNC: 5.8 G/DL (ref 6.8–8.8)
PROT SERPL-MCNC: 8 G/DL (ref 6.8–8.8)
RBC # BLD AUTO: 3.89 10E12/L (ref 4.4–5.9)
RBC # BLD AUTO: 5.09 10E12/L (ref 4.4–5.9)
RBC #/AREA URNS AUTO: 1 /HPF (ref 0–2)
SODIUM SERPL-SCNC: 138 MMOL/L (ref 133–144)
SODIUM SERPL-SCNC: 142 MMOL/L (ref 133–144)
SODIUM SERPL-SCNC: 142 MMOL/L (ref 133–144)
SOURCE: ABNORMAL
SP GR UR STRIP: 1.02 (ref 1–1.03)
SPECIMEN SOURCE: NORMAL
SQUAMOUS #/AREA URNS AUTO: <1 /HPF (ref 0–1)
UROBILINOGEN UR STRIP-MCNC: NORMAL MG/DL (ref 0–2)
WBC # BLD AUTO: 14.8 10E9/L (ref 4–11)
WBC # BLD AUTO: 18.7 10E9/L (ref 4–11)
WBC #/AREA URNS AUTO: <1 /HPF (ref 0–2)

## 2018-02-14 PROCEDURE — 51702 INSERT TEMP BLADDER CATH: CPT

## 2018-02-14 PROCEDURE — 87804 INFLUENZA ASSAY W/OPTIC: CPT | Performed by: EMERGENCY MEDICINE

## 2018-02-14 PROCEDURE — 25000125 ZZHC RX 250: Performed by: ANESTHESIOLOGY

## 2018-02-14 PROCEDURE — 99291 CRITICAL CARE FIRST HOUR: CPT | Performed by: ANESTHESIOLOGY

## 2018-02-14 PROCEDURE — 81001 URINALYSIS AUTO W/SCOPE: CPT | Performed by: EMERGENCY MEDICINE

## 2018-02-14 PROCEDURE — 96366 THER/PROPH/DIAG IV INF ADDON: CPT

## 2018-02-14 PROCEDURE — 84100 ASSAY OF PHOSPHORUS: CPT | Performed by: ANESTHESIOLOGY

## 2018-02-14 PROCEDURE — 94640 AIRWAY INHALATION TREATMENT: CPT

## 2018-02-14 PROCEDURE — 25000128 H RX IP 250 OP 636: Performed by: EMERGENCY MEDICINE

## 2018-02-14 PROCEDURE — 36415 COLL VENOUS BLD VENIPUNCTURE: CPT

## 2018-02-14 PROCEDURE — 36415 COLL VENOUS BLD VENIPUNCTURE: CPT | Performed by: EMERGENCY MEDICINE

## 2018-02-14 PROCEDURE — 83605 ASSAY OF LACTIC ACID: CPT | Performed by: EMERGENCY MEDICINE

## 2018-02-14 PROCEDURE — 94660 CPAP INITIATION&MGMT: CPT

## 2018-02-14 PROCEDURE — 27210179 ZZH KIT MONITOR CVP

## 2018-02-14 PROCEDURE — 84295 ASSAY OF SERUM SODIUM: CPT | Performed by: ANESTHESIOLOGY

## 2018-02-14 PROCEDURE — 71045 X-RAY EXAM CHEST 1 VIEW: CPT

## 2018-02-14 PROCEDURE — 25000132 ZZH RX MED GY IP 250 OP 250 PS 637: Mod: GY | Performed by: EMERGENCY MEDICINE

## 2018-02-14 PROCEDURE — 87086 URINE CULTURE/COLONY COUNT: CPT | Performed by: EMERGENCY MEDICINE

## 2018-02-14 PROCEDURE — 27210219 ZZH KIT SHRLOCK 5FR DBL LUM PWR P

## 2018-02-14 PROCEDURE — 93005 ELECTROCARDIOGRAM TRACING: CPT

## 2018-02-14 PROCEDURE — 40000275 ZZH STATISTIC RCP TIME EA 10 MIN

## 2018-02-14 PROCEDURE — 83605 ASSAY OF LACTIC ACID: CPT | Performed by: ANESTHESIOLOGY

## 2018-02-14 PROCEDURE — 99292 CRITICAL CARE ADDL 30 MIN: CPT

## 2018-02-14 PROCEDURE — 25000125 ZZHC RX 250: Performed by: NURSE PRACTITIONER

## 2018-02-14 PROCEDURE — 99291 CRITICAL CARE FIRST HOUR: CPT | Mod: 25

## 2018-02-14 PROCEDURE — 85027 COMPLETE CBC AUTOMATED: CPT | Performed by: ANESTHESIOLOGY

## 2018-02-14 PROCEDURE — 82805 BLOOD GASES W/O2 SATURATION: CPT | Performed by: ANESTHESIOLOGY

## 2018-02-14 PROCEDURE — 82803 BLOOD GASES ANY COMBINATION: CPT | Performed by: EMERGENCY MEDICINE

## 2018-02-14 PROCEDURE — 25000125 ZZHC RX 250: Performed by: EMERGENCY MEDICINE

## 2018-02-14 PROCEDURE — 96375 TX/PRO/DX INJ NEW DRUG ADDON: CPT

## 2018-02-14 PROCEDURE — 85025 COMPLETE CBC W/AUTO DIFF WBC: CPT | Performed by: EMERGENCY MEDICINE

## 2018-02-14 PROCEDURE — 25000132 ZZH RX MED GY IP 250 OP 250 PS 637: Mod: GY | Performed by: ANESTHESIOLOGY

## 2018-02-14 PROCEDURE — 80053 COMPREHEN METABOLIC PANEL: CPT | Performed by: ANESTHESIOLOGY

## 2018-02-14 PROCEDURE — A9270 NON-COVERED ITEM OR SERVICE: HCPCS | Mod: GY | Performed by: ANESTHESIOLOGY

## 2018-02-14 PROCEDURE — 36600 WITHDRAWAL OF ARTERIAL BLOOD: CPT

## 2018-02-14 PROCEDURE — 96361 HYDRATE IV INFUSION ADD-ON: CPT

## 2018-02-14 PROCEDURE — 82330 ASSAY OF CALCIUM: CPT | Performed by: ANESTHESIOLOGY

## 2018-02-14 PROCEDURE — 00000146 ZZHCL STATISTIC GLUCOSE BY METER IP

## 2018-02-14 PROCEDURE — 80053 COMPREHEN METABOLIC PANEL: CPT | Performed by: EMERGENCY MEDICINE

## 2018-02-14 PROCEDURE — 25000128 H RX IP 250 OP 636: Performed by: ANESTHESIOLOGY

## 2018-02-14 PROCEDURE — 94640 AIRWAY INHALATION TREATMENT: CPT | Mod: 76

## 2018-02-14 PROCEDURE — 87040 BLOOD CULTURE FOR BACTERIA: CPT | Performed by: EMERGENCY MEDICINE

## 2018-02-14 PROCEDURE — 36556 INSERT NON-TUNNEL CV CATH: CPT

## 2018-02-14 PROCEDURE — 20000003 ZZH R&B ICU

## 2018-02-14 PROCEDURE — A9270 NON-COVERED ITEM OR SERVICE: HCPCS | Mod: GY | Performed by: EMERGENCY MEDICINE

## 2018-02-14 PROCEDURE — 96365 THER/PROPH/DIAG IV INF INIT: CPT

## 2018-02-14 PROCEDURE — 36569 INSJ PICC 5 YR+ W/O IMAGING: CPT

## 2018-02-14 PROCEDURE — 84132 ASSAY OF SERUM POTASSIUM: CPT | Performed by: ANESTHESIOLOGY

## 2018-02-14 PROCEDURE — 3E043XZ INTRODUCTION OF VASOPRESSOR INTO CENTRAL VEIN, PERCUTANEOUS APPROACH: ICD-10-PCS | Performed by: EMERGENCY MEDICINE

## 2018-02-14 RX ORDER — LANOLIN ALCOHOL/MO/W.PET/CERES
6 CREAM (GRAM) TOPICAL
Status: DISCONTINUED | OUTPATIENT
Start: 2018-02-14 | End: 2018-02-21 | Stop reason: HOSPADM

## 2018-02-14 RX ORDER — NOREPINEPHRINE BITARTRATE/D5W 16MG/250ML
0.03-0.4 PLASTIC BAG, INJECTION (ML) INTRAVENOUS CONTINUOUS
Status: DISCONTINUED | OUTPATIENT
Start: 2018-02-14 | End: 2018-02-14

## 2018-02-14 RX ORDER — LIDOCAINE 40 MG/G
CREAM TOPICAL
Status: DISCONTINUED | OUTPATIENT
Start: 2018-02-14 | End: 2018-02-21 | Stop reason: HOSPADM

## 2018-02-14 RX ORDER — POTASSIUM CHLORIDE 7.45 MG/ML
10 INJECTION INTRAVENOUS
Status: DISCONTINUED | OUTPATIENT
Start: 2018-02-14 | End: 2018-02-21 | Stop reason: HOSPADM

## 2018-02-14 RX ORDER — MAGNESIUM SULFATE HEPTAHYDRATE 40 MG/ML
4 INJECTION, SOLUTION INTRAVENOUS EVERY 4 HOURS PRN
Status: DISCONTINUED | OUTPATIENT
Start: 2018-02-14 | End: 2018-02-21 | Stop reason: HOSPADM

## 2018-02-14 RX ORDER — CARBAMAZEPINE 100 MG/5ML
150 SUSPENSION ORAL ONCE
Status: COMPLETED | OUTPATIENT
Start: 2018-02-14 | End: 2018-02-14

## 2018-02-14 RX ORDER — LEVOTHYROXINE SODIUM 150 UG/1
150 TABLET ORAL DAILY
Status: DISCONTINUED | OUTPATIENT
Start: 2018-02-14 | End: 2018-02-21 | Stop reason: HOSPADM

## 2018-02-14 RX ORDER — HYDROCORTISONE 10 MG/1
10 TABLET ORAL EVERY EVENING
Status: DISCONTINUED | OUTPATIENT
Start: 2018-02-15 | End: 2018-02-15

## 2018-02-14 RX ORDER — BISACODYL 10 MG
10 SUPPOSITORY, RECTAL RECTAL DAILY PRN
Status: DISCONTINUED | OUTPATIENT
Start: 2018-02-14 | End: 2018-02-14

## 2018-02-14 RX ORDER — ASPIRIN 81 MG/1
81 TABLET, CHEWABLE ORAL DAILY
Status: DISCONTINUED | OUTPATIENT
Start: 2018-02-14 | End: 2018-02-19

## 2018-02-14 RX ORDER — BISACODYL 10 MG
10 SUPPOSITORY, RECTAL RECTAL DAILY PRN
Status: DISCONTINUED | OUTPATIENT
Start: 2018-02-14 | End: 2018-02-21 | Stop reason: HOSPADM

## 2018-02-14 RX ORDER — IPRATROPIUM BROMIDE AND ALBUTEROL SULFATE 2.5; .5 MG/3ML; MG/3ML
3 SOLUTION RESPIRATORY (INHALATION) EVERY 6 HOURS
Status: DISCONTINUED | OUTPATIENT
Start: 2018-02-14 | End: 2018-02-14

## 2018-02-14 RX ORDER — POTASSIUM CHLORIDE 1.5 G/1.58G
20-40 POWDER, FOR SOLUTION ORAL
Status: DISCONTINUED | OUTPATIENT
Start: 2018-02-14 | End: 2018-02-21 | Stop reason: HOSPADM

## 2018-02-14 RX ORDER — GUAR GUM
1 PACKET (EA) ORAL 3 TIMES DAILY
Status: DISCONTINUED | OUTPATIENT
Start: 2018-02-14 | End: 2018-02-21 | Stop reason: HOSPADM

## 2018-02-14 RX ORDER — SODIUM CHLORIDE 9 MG/ML
INJECTION, SOLUTION INTRAVENOUS CONTINUOUS
Status: DISCONTINUED | OUTPATIENT
Start: 2018-02-14 | End: 2018-02-18

## 2018-02-14 RX ORDER — IPRATROPIUM BROMIDE AND ALBUTEROL SULFATE 2.5; .5 MG/3ML; MG/3ML
3 SOLUTION RESPIRATORY (INHALATION) ONCE
Status: COMPLETED | OUTPATIENT
Start: 2018-02-14 | End: 2018-02-14

## 2018-02-14 RX ORDER — NALOXONE HYDROCHLORIDE 0.4 MG/ML
.1-.4 INJECTION, SOLUTION INTRAMUSCULAR; INTRAVENOUS; SUBCUTANEOUS
Status: DISCONTINUED | OUTPATIENT
Start: 2018-02-14 | End: 2018-02-21 | Stop reason: HOSPADM

## 2018-02-14 RX ORDER — HYDROMORPHONE HYDROCHLORIDE 1 MG/ML
0.2 INJECTION, SOLUTION INTRAMUSCULAR; INTRAVENOUS; SUBCUTANEOUS
Status: ACTIVE | OUTPATIENT
Start: 2018-02-14 | End: 2018-02-16

## 2018-02-14 RX ORDER — POLYETHYLENE GLYCOL 3350 17 G/17G
17 POWDER, FOR SOLUTION ORAL DAILY PRN
Status: DISCONTINUED | OUTPATIENT
Start: 2018-02-14 | End: 2018-02-21 | Stop reason: HOSPADM

## 2018-02-14 RX ORDER — POTASSIUM CHLORIDE 1500 MG/1
20-40 TABLET, EXTENDED RELEASE ORAL
Status: DISCONTINUED | OUTPATIENT
Start: 2018-02-14 | End: 2018-02-21 | Stop reason: HOSPADM

## 2018-02-14 RX ORDER — HYDROCORTISONE 10 MG/1
20 TABLET ORAL EVERY MORNING
Status: DISCONTINUED | OUTPATIENT
Start: 2018-02-15 | End: 2018-02-21 | Stop reason: HOSPADM

## 2018-02-14 RX ORDER — TESTOSTERONE CYPIONATE 200 MG/ML
100 INJECTION, SOLUTION INTRAMUSCULAR SEE ADMIN INSTRUCTIONS
Status: DISCONTINUED | OUTPATIENT
Start: 2018-02-14 | End: 2018-02-14

## 2018-02-14 RX ORDER — IPRATROPIUM BROMIDE AND ALBUTEROL SULFATE 2.5; .5 MG/3ML; MG/3ML
3 SOLUTION RESPIRATORY (INHALATION) 4 TIMES DAILY
Status: DISCONTINUED | OUTPATIENT
Start: 2018-02-14 | End: 2018-02-21 | Stop reason: HOSPADM

## 2018-02-14 RX ORDER — POTASSIUM CL/LIDO/0.9 % NACL 10MEQ/0.1L
10 INTRAVENOUS SOLUTION, PIGGYBACK (ML) INTRAVENOUS
Status: DISCONTINUED | OUTPATIENT
Start: 2018-02-14 | End: 2018-02-21 | Stop reason: HOSPADM

## 2018-02-14 RX ORDER — KETOROLAC TROMETHAMINE 15 MG/ML
15 INJECTION, SOLUTION INTRAMUSCULAR; INTRAVENOUS ONCE
Status: COMPLETED | OUTPATIENT
Start: 2018-02-14 | End: 2018-02-14

## 2018-02-14 RX ORDER — CARBAMAZEPINE 100 MG/5ML
150 SUSPENSION ORAL EVERY 6 HOURS
Status: DISCONTINUED | OUTPATIENT
Start: 2018-02-14 | End: 2018-02-16

## 2018-02-14 RX ORDER — NOREPINEPHRINE BITARTRATE/D5W 16MG/250ML
.03-.2 PLASTIC BAG, INJECTION (ML) INTRAVENOUS CONTINUOUS
Status: DISCONTINUED | OUTPATIENT
Start: 2018-02-14 | End: 2018-02-16

## 2018-02-14 RX ORDER — HEPARIN SODIUM,PORCINE 10 UNIT/ML
2-5 VIAL (ML) INTRAVENOUS
Status: DISCONTINUED | OUTPATIENT
Start: 2018-02-14 | End: 2018-02-21 | Stop reason: HOSPADM

## 2018-02-14 RX ORDER — POTASSIUM CHLORIDE 29.8 MG/ML
20 INJECTION INTRAVENOUS
Status: DISCONTINUED | OUTPATIENT
Start: 2018-02-14 | End: 2018-02-21 | Stop reason: HOSPADM

## 2018-02-14 RX ADMIN — HYDROCORTISONE SODIUM SUCCINATE 100 MG: 100 INJECTION, POWDER, FOR SOLUTION INTRAMUSCULAR; INTRAVENOUS at 18:28

## 2018-02-14 RX ADMIN — LEVOTHYROXINE SODIUM 150 MCG: 150 TABLET ORAL at 21:42

## 2018-02-14 RX ADMIN — FAMOTIDINE 20 MG: 10 INJECTION, SOLUTION INTRAVENOUS at 15:19

## 2018-02-14 RX ADMIN — NOREPINEPHRINE BITARTRATE 0.06 MCG/KG/MIN: 1 INJECTION INTRAVENOUS at 11:47

## 2018-02-14 RX ADMIN — SODIUM CHLORIDE 1000 ML: 9 INJECTION, SOLUTION INTRAVENOUS at 07:12

## 2018-02-14 RX ADMIN — CARBAMAZEPINE 150 MG: 100 SUSPENSION ORAL at 18:26

## 2018-02-14 RX ADMIN — KETOROLAC TROMETHAMINE 15 MG: 15 INJECTION, SOLUTION INTRAMUSCULAR; INTRAVENOUS at 04:35

## 2018-02-14 RX ADMIN — SODIUM CHLORIDE 1000 ML: 9 INJECTION, SOLUTION INTRAVENOUS at 08:07

## 2018-02-14 RX ADMIN — SODIUM CHLORIDE 1000 ML: 9 INJECTION, SOLUTION INTRAVENOUS at 06:16

## 2018-02-14 RX ADMIN — TAZOBACTAM SODIUM AND PIPERACILLIN SODIUM 4.5 G: 500; 4 INJECTION, SOLUTION INTRAVENOUS at 15:27

## 2018-02-14 RX ADMIN — SODIUM CHLORIDE 1000 ML: 9 INJECTION, SOLUTION INTRAVENOUS at 04:36

## 2018-02-14 RX ADMIN — CARBAMAZEPINE 150 MG: 100 SUSPENSION ORAL at 10:11

## 2018-02-14 RX ADMIN — TAZOBACTAM SODIUM AND PIPERACILLIN SODIUM 4.5 G: 500; 4 INJECTION, SOLUTION INTRAVENOUS at 21:43

## 2018-02-14 RX ADMIN — IPRATROPIUM BROMIDE AND ALBUTEROL SULFATE 3 ML: .5; 3 SOLUTION RESPIRATORY (INHALATION) at 15:37

## 2018-02-14 RX ADMIN — VITAMIN D, TAB 1000IU (100/BT) 2000 UNITS: 25 TAB at 18:26

## 2018-02-14 RX ADMIN — TAZOBACTAM SODIUM AND PIPERACILLIN SODIUM 4.5 G: 500; 4 INJECTION, SOLUTION INTRAVENOUS at 05:32

## 2018-02-14 RX ADMIN — SODIUM CHLORIDE 2244 ML: 9 INJECTION, SOLUTION INTRAVENOUS at 04:07

## 2018-02-14 RX ADMIN — LIDOCAINE HYDROCHLORIDE 1 ML: 10 INJECTION, SOLUTION EPIDURAL; INFILTRATION; INTRACAUDAL; PERINEURAL at 14:40

## 2018-02-14 RX ADMIN — IPRATROPIUM BROMIDE AND ALBUTEROL SULFATE 3 ML: .5; 3 SOLUTION RESPIRATORY (INHALATION) at 04:35

## 2018-02-14 RX ADMIN — BRIVARACETAM 100 MG: 10 SOLUTION ORAL at 21:43

## 2018-02-14 RX ADMIN — BRIVARACETAM 100 MG: 10 SOLUTION ORAL at 10:10

## 2018-02-14 RX ADMIN — NOREPINEPHRINE BITARTRATE 0.03 MCG/KG/MIN: 1 INJECTION INTRAVENOUS at 08:42

## 2018-02-14 RX ADMIN — SODIUM CHLORIDE: 9 INJECTION, SOLUTION INTRAVENOUS at 12:41

## 2018-02-14 RX ADMIN — IPRATROPIUM BROMIDE AND ALBUTEROL SULFATE 3 ML: .5; 3 SOLUTION RESPIRATORY (INHALATION) at 19:48

## 2018-02-14 RX ADMIN — ASPIRIN 81 MG 81 MG: 81 TABLET ORAL at 18:28

## 2018-02-14 NOTE — PROCEDURES
Central Line Placement       PROCEDURE:  Central Line Placement with Ultrasound Guidance.    INDICATIONS: Sepsis (monitoring of mixed venous oxygen saturations)    CONSENT: Risks (including but not limited to: pneumothorax,  bleeding, infection or artery puncture), benefits and alternatives were discussed with  parent(s) and consent for procedure was obtained.    TIMEOUT: Universal protocol was followed. TIME OUT conducted just prior to starting procedure confirmed patient identity, site/side, procedure, patient position, and availability of correct equipment, and implants.?  Yes      MEDICATION: Lidocaine    PROCEDURAL NOTE: L Internal Jugular approach was selected and the L anterior neck was prepped, cleansed and draped in a sterile fashion.  Mask, gown and gloves were used per sterile protocol.  Patient was then placed into Trendelenburg position and lidocaine was used for local anesthesia.  Vascular probe with ultrasound was used in a sterile fashion for guidence.  Introducer needle was then used to gain access to the central venous circulation.  Venous blood returned and attempted to advance wire but unable to given downstream resistance.  Attempted the same technique as previously noted more proximally which was unsuccessful as well.  No further attempts were made at this location, and the L femoral vein was subsequently chosen with the aforementioned process repeated.  Using Seldinger technique the  Triple lumen catheter was placed.  The wire was viewed on ultrasound and noted to be in the vein prior to dilation consistent with venous blood flow noted prior to wire placement.  Catheter port(s) were aspirated and flushed.  Central line was sutured in place and sterile dressing applied.         PATIENT STATUS: Patient tolerated the procedure   moderately well. There were  no complications, 2 failed attempts at L IJ.          Edgar Garcia MD

## 2018-02-14 NOTE — ED NOTES
Bed: ED25  Expected date:   Expected time:   Means of arrival:   Comments:  E1  57M  Fever, TBI hx

## 2018-02-14 NOTE — IP AVS SNAPSHOT
MRN:[id number]                      After Visit Summary   2/14/2018    Keyon Farias    MRN: [id number]           Thank you!     Thank you for choosing [facility] for your care. Our goal is always to provide you with excellent care. Hearing back from our patients is one way we can continue to improve our services. Please take a few minutes to complete the written survey that you may receive in the mail after you visit with us. Thank you!        Patient Information     Date Of Birth          1960        Designated Caregiver       Most Recent Value    Caregiver    Will someone help with your care after discharge? yes    Name of designated caregiver Savannah Farias    Phone number of caregiver [phone]    Caregiver address [address] [place]      About your hospital stay     You were admitted on:  February 14, 2018 You last received care in the:  [place] [address] [facility]    You were discharged on:  February 21, 2018       Who to Call     For medical emergencies, please call 911.  For non-urgent questions about your medical care, please call your primary care provider or clinic, [phone]          Attending Provider     Provider Specialty    Robbie Miller MD Emergency Medicine    [facility], Kenneth Quevedo MD Critical Care Medicine    Demarco Ash MD Anesthesiology       Primary Care Provider Office Phone # Fax #    Carlos Gomez [phone]      After Care Instructions     Activity       Your activity upon discharge: activity as tolerated            Diet       Follow this diet upon discharge: nothing by mouth. Tube feeding            Discharge Instructions       If questions or problems arise regarding tube function (e.g. leaking, dislodges, etc.) Contact Interventional Radiology department 24 hours a day.    For procedures that were done at [facility]:    8 AM - 4 PM Monday through Friday Contact the Nurse Line [phone]  For afterhours and  weekends [phone]    Ask for the Interventional Radiologist-on call.     For procedures that were done at [facility]:  8 AM - 4 PM Monday through Friday Contact   [phone]  For afterhours and weekends: [phone]   Ask for the Interventional Radiologist on call.       IF DIRECTED by the RADIOLOGIST, related to specific problems with the tube functioning,  go to the Emergency Department.                  Follow-up Appointments     Follow-up and recommended labs and tests        [facility], within 3-5 days for hospital follow- up.  The following labs/tests are recommended: basic metabolic panel in 2 days                  Your next 10 appointments already scheduled     Mar 02, 2018 12:30 PM CST   (Arrive by 12:15 PM)   CT ABDOMEN PELVIS W CONTRAST with SHCT1   [facility] CT ([facility])    [address] [place] [id number] [phone]           Please bring any scans or X-rays taken at other hospitals, if similar tests were done. Also bring a list of your medicines, including vitamins, minerals and over-the-counter drugs. It is safest to leave personal items at home.  Be sure to tell your doctor:   If you have any allergies.   If there s any chance you are pregnant.   If you are breastfeeding.  You may have contrast for this exam. To prepare:   Do not eat or drink for 2 hours before your exam. If you need to take medicine, you may take it with small sips of water. (We may ask you to take liquid medicine as well.)   The day before your exam, drink extra fluids at least six 8-ounce glasses (unless your doctor tells you to restrict your fluids).   You will be given instructions on how to drink the contrast.  Patients over 70 or patients with diabetes or kidney problems:   If you haven t had a blood test (creatinine test) within the last 30 days, the Cardiologist/Radiologist may require you to get this test prior to  your exam.  If you have diabetes:   Continue to take your metformin medication on the day of your exam  Please wear loose clothing, such as a sweat suit or jogging clothes. Avoid snaps, zippers and other metal. We may ask you to undress and put on a hospital gown.  If you have any questions, please call the Imaging Department where you will have your exam.              Additional Services     Home care nursing referral       RN skilled nursing visit. RN to assess hydration, nutrition and bowel status.  RN to teach medication management and tube feedings.  RN to provide tube site care and management.    Also resume prior to admission PT/OT and speech    Your provider has ordered home care nursing services. If you have not been contacted within 2 days of your discharge please call the inpatient department phone number at [phone] .                  Further instructions from your care team       [facility] will be following you at home for RN, PT/OT & speech services.  Please call [phone] if you have any questions.    [facility] will be following for TFs supplies and suction machine.  Please call [phone] ext. [phone] if you have any questions.    TF regimen:    Jevity 1.5, total of 5.5 cans/day. He starts at 10 AM and runs at 100 mL/hr, providing 1955 jamel, 83 gm protein  [employer] Fiber 1 packet TID = 45 kcal, 9 fiber       Water flushes: 120 mL every 4 hours      Pending Results     No orders found from 2/12/2018 to 2/15/2018.            Statement of Approval     Ordered          02/21/18 1303  I have reviewed and agree with all the recommendations and orders detailed in this document.  EFFECTIVE NOW     Approved and electronically signed by:  Yogi Irizarry MD             Admission Information     Date & Time Provider Department Dept. Phone    2/14/2018 Demarco Ash MD [place] [address] [facility] [phone]      Your Vitals Were     Blood Pressure Pulse Temperature Respirations Weight  "Pulse Oximetry    107/59 (BP Location: Left arm) 64 96.4  F (35.8  C) (Oral) 18 73 kg (161 lb) 96%    BMI (Body Mass Index)                   22.45 kg/m2           MyChart Information     [employer] lets you send messages to your doctor, view your test results, renew your prescriptions, schedule appointments and more. To sign up, go to [email or web address] . Click on \"Log in\" on the left side of the screen, which will take you to the Welcome page. Then click on \"Sign up Now\" on the right side of the page.     You will be asked to enter the access code listed below, as well as some personal information. Please follow the directions to create your username and password.     Your access code is: [email or web address]  Expires: 3/11/2018 12:43 PM     Your access code will  in 90 days. If you need help or a new code, please call your [place] clinic or [phone].        Care EveryWhere ID     This is your Care EveryWhere ID. This could be used by other organizations to access your [place] medical records  [id number]        Equal Access to Services     BRENDA LAO AH: Hadii timi Rivas, waadolph buenrostro, qatavon kaalmada bismark, yaw contreras. So [facility] [phone].    ATENCIÓN: Si habla español, tiene a king disposición servicios gratuitos de asistencia lingüística. Llame al [phone].    We comply with applicable federal civil rights laws and [place] laws. We do not discriminate on the basis of race, color, national origin, age, disability, sex, sexual orientation, or gender identity.               Review of your medicines      START taking        Dose / Directions    nystatin 361091 UNIT/GM Powd   Commonly known as:  MYCOSTATIN   Used for:  Candidiasis of skin        Apply three times daily to the groin for 5 days.   Quantity:  15 g   Refills:  0         CONTINUE these medicines which may have CHANGED, or have new prescriptions. If we are uncertain of the size of " tablets/capsules you have at home, strength may be listed as something that might have changed.        Dose / Directions    levothyroxine 25 mcg/mL Susp   Commonly known as:  SYNTHROID   This may have changed:  additional instructions   Used for:  Panhypopituitarism (H)        Dose:  150 mcg   6 mLs (150 mcg) by Per J Tube route every morning (before breakfast)   Refills:  0         CONTINUE these medicines which have NOT CHANGED        Dose / Directions    ACETAMINOPHEN PO        Dose:  650 mg   650 mg by Per Feeding Tube route every 4 hours as needed for pain   Refills:  0       albuterol (2.5 MG/3ML) 0.083% neb solution        Dose:  1 vial   Take 1 vial (2.5 mg) by nebulization every 4 hours as needed for shortness of breath / dyspnea   Quantity:  1 Box   Refills:  0       aspirin 10 mg/mL Susp   Used for:  Routine adult health maintenance        Dose:  81 mg   8.1 mLs (81 mg) by Per J Tube route daily   Refills:  0       bacitracin ointment        Apply topically daily as needed for wound care To PEG site.   Refills:  0       bisacodyl 10 MG Suppository   Commonly known as:  DULCOLAX   Used for:  Constipation, unspecified constipation type        Dose:  10 mg   Place 1 suppository (10 mg) rectally daily as needed for constipation   Quantity:  30 suppository   Refills:  0       BRIVIACT 10 MG/ML solution   Generic drug:  Brivaracetam        Dose:  100 mg   Take 100 mg by mouth 2 times daily   Refills:  0       calcium carbonate 1250 (500 CA) MG/5ML Susp suspension   Used for:  Malnutrition (H)        Dose:  1250 mg   5 mLs (1,250 mg) by Per J Tube route 3 times daily (with meals)   Quantity:  450 mL   Refills:  0       carBAMazepine 100 MG/5ML suspension   Commonly known as:  TEGretol        Dose:  150 mg   Take 150 mg by mouth every 6 hours   Refills:  0       fiber modular packet   Used for:  Necrotizing pancreatitis        Dose:  1 packet   1 packet by Per Feeding Tube route 3 times daily   Refills:  0        * hydrocortisone 2 mg/mL Susp   Commonly known as:  CORTEF   Used for:  Panhypopituitarism (H)        Dose:  20 mg   10 mLs (20 mg) by Per J Tube route every morning   Refills:  0       * hydrocortisone 2 mg/mL Susp   Commonly known as:  CORTEF   Used for:  Panhypopituitarism (H)        Dose:  10 mg   Take 5 mLs (10 mg) by mouth every evening   Refills:  0       melatonin 1 MG/ML Liqd liquid        Dose:  6 mg   6 mg by Jejunal Tube route nightly as needed for sleep   Refills:  0       miconazole 2 % powder   Commonly known as:  MICATIN; MICRO GUARD   Used for:  Rash        Apply topically every hour as needed for other (topical candidiasis)   Refills:  0       multivitamins with minerals Liqd liquid   Used for:  Necrotizing pancreatitis, Malnutrition (H)        Dose:  15 mL   15 mLs by Per J Tube route daily   Refills:  0       pantoprazole Susp suspension   Commonly known as:  PROTONIX        Dose:  20 mg   Take 20 mg by mouth daily   Refills:  0       potassium & sodium phosphates 280-160-250 MG Packet   Commonly known as:  NEUTRA-PHOS        Dose:  1 packet   Take 1 packet by mouth 3 times daily 0900, 1500, 2100.   Refills:  0       testosterone cypionate 200 MG/ML injection   Commonly known as:  DEPOTESTOTERONE        Dose:  100 mg   Inject 100 mg into the muscle See Admin Instructions Every 2-3 weeks.   Refills:  0       VITAMIN D (CHOLECALCIFEROL) PO        Dose:  2000 Units   Take 2,000 Units by mouth daily   Refills:  0       * Notice:  This list has 2 medication(s) that are the same as other medications prescribed for you. Read the directions carefully, and ask your doctor or other care provider to review them with you.         Where to get your medicines      These medications were sent to [facility] - [address]  [address], [place] [address]     Phone:  [phone]     nystatin [id number] UNIT/GM Powd                Protect others around you: Learn how to  safely use, store and throw away your medicines at www.disposemymeds.org.             Medication List: This is a list of all your medications and when to take them. Check marks below indicate your daily home schedule. Keep this list as a reference.      Medications           Morning Afternoon Evening Bedtime As Needed    ACETAMINOPHEN PO   650 mg by Per Feeding Tube route every 4 hours as needed for pain                                   albuterol (2.5 MG/3ML) 0.083% neb solution   Take 1 vial (2.5 mg) by nebulization every 4 hours as needed for shortness of breath / dyspnea                                   aspirin 10 mg/mL Susp   8.1 mLs (81 mg) by Per J Tube route daily                                   bacitracin ointment   Apply topically daily as needed for wound care To PEG site.                                   bisacodyl 10 MG Suppository   Commonly known as:  DULCOLAX   Place 1 suppository (10 mg) rectally daily as needed for constipation                                   BRIVIACT 10 MG/ML solution   Take 100 mg by mouth 2 times daily   Last time this was given:  100 mg on 2/21/2018  9:48 AM   Generic drug:  Brivaracetam                                calcium carbonate 1250 (500 CA) MG/5ML Susp suspension   5 mLs (1,250 mg) by Per J Tube route 3 times daily (with meals)                                         carBAMazepine 100 MG/5ML suspension   Commonly known as:  TEGretol   Take 150 mg by mouth every 6 hours   Last time this was given:  150 mg on 2/21/2018 11:57 AM                                            fiber modular packet   1 packet by Per Feeding Tube route 3 times daily   Last time this was given:  1 packet on 2/21/2018 11:58 AM                                         * hydrocortisone 2 mg/mL Susp   Commonly known as:  CORTEF   10 mLs (20 mg) by Per J Tube route every morning                                   * hydrocortisone 2 mg/mL Susp   Commonly known as:  CORTEF   Take 5 mLs (10 mg) by  mouth every evening                                   levothyroxine 25 mcg/mL Susp   Commonly known as:  SYNTHROID   6 mLs (150 mcg) by Per J Tube route every morning (before breakfast)                                melatonin 1 MG/ML Liqd liquid   6 mg by Jejunal Tube route nightly as needed for sleep                                miconazole 2 % powder   Commonly known as:  MICATIN; MICRO GUARD   Apply topically every hour as needed for other (topical candidiasis)   Last time this was given:  2/18/2018  6:41 PM                                multivitamins with minerals Liqd liquid   15 mLs by Per J Tube route daily   Last time this was given:  15 mLs on 2/21/2018  9:47 AM                                nystatin [id number] UNIT/GM Powd   Commonly known as:  MYCOSTATIN   Apply three times daily to the groin for 5 days.                                            pantoprazole Susp suspension   Commonly known as:  PROTONIX   Take 20 mg by mouth daily   Last time this was given:  40 mg on 2/21/2018 11:57 AM                                   potassium & sodium phosphates 280-160-250 MG Packet   Commonly known as:  NEUTRA-PHOS   Take 1 packet by mouth 3 times daily 0900, 1500, 2100.                                         testosterone cypionate 200 MG/ML injection   Commonly known as:  DEPOTESTOTERONE   Inject 100 mg into the muscle See Admin Instructions Every 2-3 weeks.                                VITAMIN D (CHOLECALCIFEROL) PO   Take 2,000 Units by mouth daily   Last time this was given:  2,000 Units on 2/21/2018  9:48 AM                                * Notice:  This list has 2 medication(s) that are the same as other medications prescribed for you. Read the directions carefully, and ask your doctor or other care provider to review them with you.              More Information        Discharge Instructions: Caring for Your Gastrostomy Tube (G-Tube)  You have been discharged with a gastrostomy tube, or G-tube. The G-tube  was inserted through your belly (abdominal) wall and into your stomach. The tube will provide you with food, fluids, and medicine. Your G-tube may move in and out slightly. If the tube comes out all the way in the first few weeks after placement, don t put it back in. Call your healthcare provider right away. Don t wait until the next day. This is important because the G-tube tract through the skin may close very quickly, often within 24 hours. After the first few weeks, if the tube comes out, ask your provider how to get a spare tube. Ask your provider how to replace it at home.   General guidelines for use    Wash your hands thoroughly with soap and warm water before starting your feeding.    During the feeding and for 1 hour after, sit in a chair or sit up in bed.    Before feeding begins, check to see that your stomach is empty. You will need a syringe for the following steps:     Insert the tip of an empty syringe into the end of the G-tube.    Pull back on the syringe to withdraw contents of the stomach.    Don t begin the feeding if more than 100 mL remains from the previous feeding.    Clean the area around the tube with mild soap and water.    Pat the area dry during bathing and as needed.    Clean the area more often if it gets wet or is leaking some discharge (weeping).    Keep the disk (flange) a few millimeters off the skin. This should leave just enough room for a gauze sponge. Pulling the flange too tightly can damage the skin. But leaving the flange too loose leads to leaking around the G-tube. Your healthcare team will go over these guidelines before you leave the hospital.     The name of my feeding supplement/formula is:  ___________________________________________     Amount per feeding:   ___________________________________________     Times per day:  ___________________________________________     Amount of water used to flush tube:  ___________________________________________   Gravity feeding  method    Fill the feeding bag with the prescribed amount of formula. Run the fluid to the end of the tube to clear out any air. Clamp the tube.    Connect the end of the feeding bag tubing to the G-tube.    Hang the bag at least 18 inches above the level of your G-tube.    Open the clamp and allow the formula to flow into the G-tube.    Follow with the prescribed amount of water.    After each feeding, rinse the bag and tubing. Every 24 hours, wash the bag and tubing with soapy water and rinse thoroughly.  Pump feeding method    Fill the feeding bag with the prescribed amount of formula. Run the fluid to the end of the tube to clear out any air. Clamp the tube.    Connect the end of the feeding bag tubing to the G-tube. Set the pump rate of flow to the prescribed rate per hour.    Open the clamp on the tubing; press the start button on your pump.    When feeding is complete, disconnect the feeding set.    Connect the tip of an empty syringe to the feeding tube and slowly push in the prescribed amount of water.    After each feeding, rinse the bag and tubing. Every 24 hours, wash the bag and tubing with soapy water and rinse thoroughly.  Syringe feeding method    Remove the plunger from a syringe and connect the syringe to the G-tube.    Hold the syringe upright and pour the formula into the syringe.    Refill the syringe as the formula reaches the bottom of the syringe.    Repeat the process until the prescribed amount of formula is given.    Follow the feeding with the prescribed amount of water.    After each feeding, rinse the bag and tubing. Every 24 hours, wash the bag and tubing with soapy water and rinse thoroughly.  When to call your provider  Call your healthcare provider right away if you have any of the following:    The tube comes out     The tube is blocked    Vomiting    Fever above 100.4 F (38.0 C)    Diarrhea that lasts more than 2 days    Signs of infection (redness, swelling, or warmth at the tube  site)    Drainage from the tube site   Date Last Reviewed: 8/1/2016 2000-2017 [employer]. [address] [place] [address], [place] [address]. All rights reserved. This information is not intended as a substitute for professional medical care. Always follow your healthcare professional's instructions.                Understanding Aspiration from Dysphagia  Aspiration is when something enters your airway or lungs by accident. It may be food, liquid, or some other material. This can cause serious health problems, such as pneumonia. Aspiration can happen when you have trouble swallowing normally. This is known as dysphagia.  What happens when you swallow?  When you swallow food, it passes from your mouth down into your throat (pharynx). From there, the food moves down through a long tube (esophagus) and into your stomach. This journey is made possible by a series of actions from the muscles in these areas.  The pharynx is also part of the system that brings air into your lungs. When you breathe, air enters your mouth and moves into the pharynx. The air then goes down into your main airway (trachea) and into your lungs. A flap of tissue called the epiglottis sits over the top of the trachea. This flap blocks food and drink from going down into the trachea when you swallow.  What causes aspiration?  Aspiration from dysphagia is caused when the muscles in your throat don t work normally. This lets food or drink enter the trachea when you swallow. This can happen as food goes down when you swallow. Or it can happen if food comes back up from your stomach.  When a person has dysphagia, aspiration is always a risk. You may be at risk for aspiration from dysphagia if you have any of these medical conditions:    Stroke    Severe dental problems    Conditions that lead to less saliva, such as Sjogren s syndrome    Mouth sores    Parkinson disease or other neurologic conditions    Muscular dystrophies    Blockage in the  esophagus, such as a growth from cancer    History of radiation or surgery to treat throat cancer  Symptoms of aspiration from dysphagia  Some people who aspirate do not have any signs or symptoms. This is called silent aspiration. But aspiration can cause symptoms such as:    Sense of food sticking in your throat or coming back into your mouth    Pain when swallowing    Trouble starting a swallow    Coughing or wheezing after eating    Chest discomfort or heartburn    Fever 30 minutes to an hour after eating    Too much saliva    Feeling congested after eating or drinking    Having a wet-sounding voice during or after eating or drinking    Shortness of breath or tiredness while eating    Vomiting blood    Pneumonia that comes back again and again  Symptoms can happen right after eating. Or they may happen over time. You may not have all of these symptoms. They may depend on how often and how much food or drink you aspirate.  Diagnosing aspiration from dysphagia  You will need to be checked for aspiration from dysphagia if you have symptoms. You may also need to be checked if you have had a stroke or other health problem that can cause trouble swallowing. If your healthcare provider thinks you may be aspirating, you may be told to not eat or drink until you are tested.  Your healthcare provider will ask about your medical history and symptoms. This may be done by a speech-language pathologist (SLP). The SLP will try to find out if you have problems with the lower or upper part of your swallowing muscles. The SLP may ask about what foods or drink cause problems, and when your symptoms occur.  You may have a physical exam. This may include an exam of your teeth, lips, jaws, tongue, and cheeks. You may be asked to move these areas in certain ways and make certain sounds. Your SLP may also test how you swallow different types of liquids and solids.  You may also need 1 or more tests. These can help to find the cause of  your dysphagia. Tests are often very helpful in showing cases of silent aspiration. The test may include:    Modified barium swallow test (MBS). This is done to show if material is going into your lungs.    Fiberoptic endoscopic evaluation of swallowing (FEES). This test can also show if material is going into your lungs    Pharyngeal manometry. This test checks the pressure inside your esophagus  Date Last Reviewed: 3/1/2017    [id number] [facility]. [address] [place] [address], [place] [address]. All rights reserved. This information is not intended as a substitute for professional medical care. Always follow your healthcare professional's instructions.
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
## 2018-02-14 NOTE — PHARMACY-ADMISSION MEDICATION HISTORY
Admission medication history interview status for the 2/14/2018  admission is complete. See EPIC admission navigator for prior to admission medications     Medication history source reliability:Good    Actions taken by pharmacist (provider contacted, etc):None     Additional medication history information not noted on PTA med list :None    Medication reconciliation/reorder completed by provider prior to medication history? No    Time spent in this activity: 20    Prior to Admission medications    Medication Sig Last Dose Taking? Auth Provider   carBAMazepine (TEGRETOL) 100 MG/5ML suspension Take 150 mg by mouth every 6 hours 2/14/2018 at Unknown time Yes Unknown, Entered By History   melatonin (MELATONIN) 1 MG/ML LIQD liquid 6 mg by Jejunal Tube route nightly as needed for sleep 2/13/2018 at Unknown time Yes Unknown, Entered By History   Brivaracetam (BRIVIACT) 10 MG/ML solution Take 100 mg by mouth 2 times daily 2/14/2018 at Unknown time Yes Reported, Patient   pantoprazole (PROTONIX) SUSP suspension Take 20 mg by mouth daily  2/13/2018 at Unknown time Yes Reported, Patient   potassium & sodium phosphates (NEUTRA-PHOS) 280-160-250 MG Packet Take 1 packet by mouth 3 times daily 0900, 1500, 2100.  2/13/2018 at Unknown time Yes Unknown, Entered By History   VITAMIN D, CHOLECALCIFEROL, PO Take 2,000 Units by mouth daily 2/13/2018 at Unknown time Yes Unknown, Entered By History   bacitracin ointment Apply topically daily as needed for wound care To PEG site.  Yes Unknown, Entered By History   aspirin 10 mg/mL 8.1 mLs (81 mg) by Per J Tube route daily 2/13/2018 at Unknown time Yes Mariana Venegas MD   multivitamins with minerals (CERTAVITE/CEROVITE) LIQD liquid 15 mLs by Per J Tube route daily 2/13/2018 at Unknown time Yes Mariana Venegas MD   fiber modular (NUTRISOURCE FIBER) packet 1 packet by Per Feeding Tube route 3 times daily  Yes Mariana Venegas MD   calcium carbonate 1250 (500 CA)  MG/5ML SUSP suspension 5 mLs (1,250 mg) by Per J Tube route 3 times daily (with meals) 2/13/2018 at Unknown time Yes Mariana Venegas MD   levothyroxine (SYNTHROID) 25 mcg/mL 6 mLs (150 mcg) by Per J Tube route every morning (before breakfast)  Patient taking differently: 150 mcg by Per J Tube route every morning (before breakfast) Hold tube feeding 1 hour prior and 1 hour after medication administered. 2/13/2018 at Unknown time Yes Mariana Venegas MD   hydrocortisone (CORTEF) 2 mg/mL 10 mLs (20 mg) by Per J Tube route every morning 2/13/2018 at Unknown time Yes Mariana Venegas MD   hydrocortisone (CORTEF) 2 mg/mL Take 5 mLs (10 mg) by mouth every evening 2/13/2018 at Unknown time Yes Mariana Venegas MD   bisacodyl (DULCOLAX) 10 MG Suppository Place 1 suppository (10 mg) rectally daily as needed for constipation  Yes Mariana Venegas MD   miconazole (MICATIN; MICRO GUARD) 2 % powder Apply topically every hour as needed for other (topical candidiasis)  Yes Alicia Roca APRN CNP   ACETAMINOPHEN  mg by Per Feeding Tube route every 4 hours as needed for pain  Yes Unknown, Entered By History   testosterone cypionate (DEPOTESTOTERONE CYPIONATE) 200 MG/ML injection Inject 100 mg into the muscle See Admin Instructions Every 2-3 weeks. 2/13/2018 at Unknown time Yes Unknown, Entered By History   albuterol (2.5 MG/3ML) 0.083% neb solution Take 1 vial (2.5 mg) by nebulization every 4 hours as needed for shortness of breath / dyspnea   Tamara Mcwilliams MD

## 2018-02-14 NOTE — ED PROVIDER NOTES
History     Chief Complaint:  Fever    HPI     The history is limited secondary to nonverbal patient.    Keyon Farias is a 57-year-old male with complicated past medical history noted below who presents for evaluation of fever and shortness of breath.  The patient presents to emergency department with his mother reports that she recorded a temperature of 104 F this morning at 0230.  She states that she then crushed up to regular strength Tylenol and put them through his G-tube at 0315.  She states that he has been coughing as well.  No vomiting.  She has been nebulizing the patient every 4 hours.  She reports he had a pneumonia at this winter, and has had cough intermittently since then, worse in the past couple of days.  The patient denies any pain.    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 No pertinent family history.     Social History:  Marital Status: Single  Presents to the ED via EMS, accompanied by mother  Tobacco Use: Former smoker, quit 1989 no  Alcohol Use: No   PCP: [facility]      Review of Systems   Unable to perform ROS: Patient nonverbal     Physical Exam     Patient Vitals for the past 24 hrs:   BP Temp Temp src Heart Rate Resp SpO2 Weight   02/14/18 0630 (!) 89/55 - - 125 (!) 32 94 % -   02/14/18 0620 101/57 - - 124 27 95 % -   02/14/18 0614 (!) 82/53 - - 125 20 94 % -    02/14/18 0610 (!) 73/57 - - 128 29 94 % -   02/14/18 0600 (!) 82/50 - - - - - -   02/14/18 0550 92/86 - - 126 18 98 % -   02/14/18 0540 98/50 - - 125 (!) 34 95 % -   02/14/18 0530 (!) 89/50 - - 129 24 96 % -   02/14/18 0517 90/59 - - 137 (!) 31 90 % -   02/14/18 0508 (!) 75/58 98.7  F (37.1  C) Temporal 140 17 93 % -   02/14/18 0500 (!) 77/57 - - 137 (!) 32 92 % -   02/14/18 0456 (!) 81/71 - - 138 (!) 34 - -   02/14/18 0449 (!) 81/63 - - 130 15 - -   02/14/18 0445 (!) 81/64 - - 133 23 - -   02/14/18 0430 91/64 - - - - - 74.8 kg (165 lb)   02/14/18 0415 93/65 - - 140 15 93 % -   02/14/18 0400 90/60 - - 138 10 - -   02/14/18 0355 (!) 89/65 - - 137 27 - -   02/14/18 0346 95/56 101.4  F (38.6  C) Temporal 147 (!) 44 (!) 88 % -        Physical Exam  Constitutional:  Appears well-developed and well-nourished. Cooperative. non-verbal. Nods yes/no. Appears dyspenic  HENT:   Head:    Atraumatic.   Mouth/Throat:   Oropharynx is without erythema or exudate and mucous     membranes are dry.   Eyes:    Conjunctivae normal and EOM are normal.      Pupils are equal, round, and reactive to light.   Neck:    Normal range of motion. Neck supple.   Cardiovascular:  Tacky rate, regular rhythm, normal heart sounds and radial and    dorsalis pedis pulses are 2+ and symmetric.    Pulmonary/Chest:  Tachypnea, rales both lung bases right greater than left, rhonchi on the right that change with cough.  Abdominal:   Soft. Bowel sounds are normal.      No splenomegaly or hepatomegaly. No tenderness. No rebound.   Musculoskeletal:  Normal range of motion. No edema and no tenderness.   Neurological:  Alert. Normal strength. No cranial nerve deficit.   Skin:    Skin is warm and dry.   Psychiatric:   Good eye contact, follows commands, difficult to assess due to the patient's nonverbal status      Emergency Department Course   ECG:  @ 0639  Indication: Tachycardia   Vent. Rate 127 bpm. NH interval 132 ms. QRS duration 90 ms. QT/QTc 336/488 ms.  P-R-T axis 49 -46 39.   Sinus tachycardia. Left anterior fascicular block. Abnormal ECG.  Rate improved and lateral ST changes improved when compared to previous ECG from 10/3/17   Read @ 0655 by Dr. Miller.     Imaging:  Chest XR, 1 view Portable  A small to moderate-sized region of hazy opacities in the inferior right lung, new since 1/6/2018.  This likely represents pneumonia.  Report per radiology: Vinny Burrows MD (02/14/18 05:45:21)    Radiographic findings were communicated with the Admitting MD who voiced understanding of the findings.    Laboratory:  Blood:  CBC:  WBC 14.8, HGB 16.0, , otherwise WNL   CMP: Glc 116, Albumin 3.3, otherwise WNL (Creatinine 1.09)   (0350) Lactate: 4.1   Blood Culture: Pending ×2.    (0618) Lactate: 3.4    Venous Blood Gas    Recent Labs  Lab 02/14/18  0450   PHV 7.44*   PCO2V 41   PO2V 56*   HCO3V 28   JAMI 3.1        Nasal Swab:  Influenza A/B Antigen: Negative.      Procedures:  Central line was placed by Dr. Garcia. See procedure note.      Interventions:  (0435) DuoNeb, 3 mL, nebulizer    (0435) Toradol, 15 mg, IV injection   (0406) Normal Saline, 125 mL/hr, IV infusion    (0407) Normal Saline, 2.244 liter, IV bolus   (0532) Zosyn, 4.5 g, IV   (0616) Normal Saline, 1 liter, IV bolus   (0712) Normal Saline, 1 liter, IV bolus     Emergency Department Course:  Nursing notes and vitals reviewed.    (6453) I entered the room with my scribe, obtained the history, and performed an exam of the patient as documented above.    A peripheral IV was established. Blood was drawn from the patient. This was sent for laboratory testing, findings above.      The patient had a portable chest x-ray done in the emergency department.     The patient's nose was swabbed and this sample was sent for influenza screen, findings above.      Findings and plan explained to the mother of the patient who consents to admission.     (9703) I discussed the patient with Dr. Rowe of the  intensivist service, who will admit the patient to an ICU bed for further monitoring, evaluation, and treatment.     (3067) Central line placement was performed in the emergency department by Dr. Garcia.     Impression & Plan      CMS Diagnoses:   The patient has signs of Severe Sepsis as evidenced by:    1. 2 SIRS criteria, AND  2. Suspected infection, AND   3. Organ dysfunction:  SBP <90, MAP < 65, or SBP decrease of >40 from baseline due to infection, Lactic Acid >2 and Urine output <0.5/kg/hr for more than 2 hours despite fluid resuscitation      Time severe sepsis diagnosis confirmed = 0437 as this was the time when Lactate resulted, and the level was >2      3 Hour Severe Sepsis Bundle Completion:  1. Initial Lactic Acid Result:   Recent Labs   Lab Test  02/14/18   0618  02/14/18   0350  01/06/18   1053   LACT  3.4*  4.1*  1.5     2. Blood Cultures before Antibiotics: Yes  3. Broad Spectrum Antibiotics Administered: Yes     4. 4500 ml of IV fluids.  5. Zosyn 4.5 g IV for health care associated pneumonia    6 Hour Severe Sepsis Bundle Completion:  1. Repeat Lactic Acid Level: 3.4  2. Vasopressors started for Persistent Hypotension defined by 2 or more consecutive SBP <90 or MAP <65 in the ONE Hour after 30cc/kg IVF bolus completed, pressures intermittently below MAP of 65. Post fluid admin. Central line and Levophed started in ED.  I attest to having performed a repeat sepsis exam and assessment of perfusion at 0700 and the results demonstrate improved perfusion.           Medical Decision Making:  The patient is a 57-year-old man with a history of TBI, dysphasia and NG tube.  He presents with fever, cough, hypoxemia and increased work of breathing.  He is noted to be tachycardic.  He already had Tylenol per G-tube just prior to his ED arrival.    2 large-bore IVs were started and a 30 cc/kg bolus was ordered with concern for severe sepsis.     2 large-bore IVs were started, and fluid bolus was ordered with  concern for sepsis.  Lactate returned positive at 4.1.  Chest x-ray suggest pneumonia.  IV Zosyn was ordered for likely healthcare associated pneumonia.  The patient also has risk for aspiration pneumonia.    EKG showed sinus tachycardia.  It does not look acutely ischemic.  The patient has no complaint of chest pain.    White cell count returned elevated.  Platelets are borderline low at 140.  There is no report of recent bleeding.  VBG looks fairly unremarkable on supplemental oxygen.  Hemoglobin is normal.  Conference of metabolic panel looks unremarkable.    The patient has not had any urine output despite fluid resuscitation so urinalysis is not obtained.  There is no low abdominal distention or tenderness.  I asked the nurse to start a Lerma catheter.    Despite aggressive fluid resuscitation, fever control, the patient remains tachycardic and blood pressures are intermittently falling below and an MAP of 65.  Because of this a central line was placed by Dr. Garcia.  And Levophed is ordered.  Prior to placing the central line I obtained verbal consent from the patient's mother, who is his healthcare power of .    The patient looks clinically improved from the time of his arrival, with improved color, perfusion and alertness.      I discussed test results and the plan for treatment with the patient's mother first in person and then by phone after she left.  The ICU doctor accepted the patient for admission.          Critical Care time:  70 minutes exclusive of procedures    Diagnosis:    ICD-10-CM    1. Septic shock (H) A41.9 UA with Microscopic    R65.21 Urine Culture Aerobic Bacterial   2. Pneumonia of right lower lobe due to infectious organism (H) J18.1        Disposition:  Admitted to ICU      [facility] EMERGENCY DEPARTMENT       Scribe disclosure:  Sammy DYKES, am serving as a scribe on 2/14/2018 at 4:13 AM to personally document services performed by Dr. Miller based on my  observations and the provider's statements to me.                  Robbie Miller MD  02/14/18 0959

## 2018-02-14 NOTE — H&P
Critical Care  Note      02/14/2018    Name: Keyon Farias MRN#: [id number]   Age: 57 year old YOB: 1960     Hsptl Day# 0  ICU DAY #    MV DAY #             Problem List:   Active Problems:    * No active hospital problems. 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   A central line was placed and the patient was started on pressors     Assessment and plan :     Keyon Farias IS a 57 year old male admitted on 2/14/2018 for sepsis with possible source as lungs   I have personally reviewed the daily labs, imaging studies, cultures and discussed the case with referring physician and consulting physicians.     My assessment and plan by system for this patient is as follows:    Neurology/Psychiatry:   1. Awake and alert and follows commands  2. Analgesia  3. Anxiety  Plan  Currently does not need sedation medications and is cooperative    Cardiovascular:   1.Hemodynamics -started on nor epinephrine  2.Rhythm - sinus tachycardia  3. Ischemia - unlikley  Plan  Fluid resuscitation and pressors as needed  Left ventricular function on echo done was 60-65% in December 2017    Pulmonary/Ventilator Management:   1. Airway native  2. Oxygenation/ventilation/mechanics: consider BIPAP 12/7 as necessary  Will need duonebs QID  Plan  - Currently not in so much of distress that he needs intubation. He may benefit from BIPAP.   Consider duonebs    GI and Nutrition :   1.  Gtube feeds on hold      Renal/Fluids/Electrolytes:   1. Continue to monitor fluid status  2. Electrolyte abnormality  3. Acid/base status, normal  4. Volume status: euvolemic  Plan  -- monitor function and electrolytes as needed with replacement per ICU protocols. - generally avoid nephrotoxic agents such as NSAID, IV contrast unless specifically required  - adjust medications as needed for renal clearance  - follow I/O's as appropriate.    Infectious Disease:   1. Continue zosyn      Endocrine:   1.  Stress induced hyperglycemia    Plan  - ICU insulin protocol, goal sugar <180    Hematology/Oncology:   1. Anemia, no signs, symptoms of active blood loss    Plan  -Continue monitoring daily     IV/Access:   1. Venous access - PICC Line   2. Arterial access - None  3.  Plan  - central access required and necessary      ICU Prophylaxis:   1. DVT: Hep Subq/ LMWH/mechanical  2. VAP: HOB 30 degrees, chlorhexidine rinse  3. Stress Ulcer: PPI/H2 blocker  4. Restraints: Nonviolent soft two point restraints required and necessary for patient safety and continued cares and good effect as patient continues to pull at necessary lines, tubes despite education and distraction. Will readdress daily.   5. Wound care  -   6. Feeding -will start feeding if pressor requirements decrease  7. Family Update:none seen so far  8. Disposition -ICU        Key goals for next 24 hours:   1.  2.  3.               Medical History:     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      Pneumonia      Seizures (H)     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     Septic shock (H)      Spastic hemiplegia affecting  dominant side (H)     related to wil injury     Thyroid disease      Tracheostomy care (H)      Traumatic brain injury (H) 1989     Unspecified cerebral artery occlusion with cerebral infarction 1989     UTI (urinary tract infection)      Ventricular fibrillation (H)      Ventricular tachyarrhythmia (H)      Past Surgical History:   Procedure Laterality Date     ENDOSCOPIC ULTRASOUND UPPER GASTROINTESTINAL TRACT (GI) N/A 1/30/2017    Procedure: ENDOSCOPIC ULTRASOUND, ESOPHAGOSCOPY / UPPER GASTROINTESTINAL TRACT (GI);  Surgeon: Jus Montana MD;  [facility]: [facility]     ENDOSCOPIC ULTRASOUND, ESOPHAGOSCOPY, GASTROSCOPY, DUODENOSCOPY (EGD), NECROSECTOMY N/A 2/7/2017    Procedure: ENDOSCOPIC ULTRASOUND, ESOPHAGOSCOPY, GASTROSCOPY, DUODENOSCOPY (EGD), NECROSECTOMY;  Surgeon: Jack Marcus MD;  Location:  OR     ESOPHAGOSCOPY, GASTROSCOPY, DUODENOSCOPY (EGD), COMBINED  3/13/2014    Procedure: COMBINED ESOPHAGOSCOPY, GASTROSCOPY, DUODENOSCOPY (EGD), BIOPSY SINGLE OR MULTIPLE;  gastroscopy;  Surgeon: Digna Rhodes MD;  Location: [facility]     ESOPHAGOSCOPY, GASTROSCOPY, DUODENOSCOPY (EGD), COMBINED N/A 12/6/2016    Procedure: COMBINED ESOPHAGOSCOPY, GASTROSCOPY, DUODENOSCOPY (EGD);  Surgeon: Digna Rhodes MD;  Location: [facility]     ESOPHAGOSCOPY, GASTROSCOPY, DUODENOSCOPY (EGD), COMBINED N/A 2/7/2017    Procedure: COMBINED ENDOSCOPIC ULTRASOUND, ESOPHAGOSCOPY, GASTROSCOPY, DUODENOSCOPY (EGD), FINE NEEDLE ASPIRATE/BIOPSY;  Surgeon: Too Thakur MD;  Location:  OR     HEAD & NECK SURGERY      reconstructive facial surgery following accident in 1989     LAPAROSCOPIC APPENDECTOMY  7/30/2013    Procedure: LAPAROSCOPIC APPENDECTOMY;  LAPAROSCOPIC APPENDECTOMY;  Surgeon: Manish Pierce MD;  Location:  OR     LAPAROSCOPIC ASSISTED INSERTION TUBE GASTROTOMY N/A 9/7/2016    Procedure: LAPAROSCOPIC ASSISTED INSERTION TUBE GASTROSTOMY;  Surgeon: Manish Pierce MD;  Location:   OR     ORTHOPEDIC SURGERY      right hand repair     TRACHEOSTOMY N/A 9/3/2016    Procedure: TRACHEOSTOMY;  Surgeon: João Ortiz MD;  Location: [facility] OR     TRACHEOSTOMY N/A 12/2/2016    Procedure: TRACHEOSTOMY;  Surgeon: João Ortiz MD;  Location:  OR     VASCULAR SURGERY       Social History     Social History     Marital status: Single     Spouse name: N/A     Number of children: N/A     Years of education: N/A     Occupational History     Not on file.     Social History Main Topics     Smoking status: Former Smoker     Quit date: 4/23/1989     Smokeless tobacco: Never Used     Alcohol use No     Drug use: No     Sexual activity: No     Other Topics Concern     Not on file     Social History Narrative        Allergies   Allergen Reactions     Dilantin [Phenytoin Sodium]      Valproic Acid      Toxicity c bone marrow suspension, elevated ammonia levels               Key Medications:       NaCl 1,000 mL (02/14/18 7136)     norepinephrine 0.06 mcg/kg/min (02/14/18 1147)        Home Meds    No current facility-administered medications on file prior to encounter.   Current Outpatient Prescriptions on File Prior to Encounter:  albuterol (2.5 MG/3ML) 0.083% neb solution Take 1 vial (2.5 mg) by nebulization every 4 hours as needed for shortness of breath / dyspnea   carBAMazepine (TEGRETOL) 100 MG/5ML suspension Take 150 mg by mouth every 6 hours   melatonin (MELATONIN) 1 MG/ML LIQD liquid 6 mg by Jejunal Tube route nightly as needed for sleep   Brivaracetam (BRIVIACT) 10 MG/ML solution Take 100 mg by mouth 2 times daily   pantoprazole (PROTONIX) SUSP suspension Take 20 mg by mouth 2 times daily   potassium & sodium phosphates (NEUTRA-PHOS) 280-160-250 MG Packet Take 1 packet by mouth 3 times daily 0900, 1500, 2100.    VITAMIN D, CHOLECALCIFEROL, PO Take 2,000 Units by mouth daily   bacitracin ointment Apply topically daily as needed for wound care To PEG site.   aspirin 10 mg/mL 8.1 mLs (81 mg)  by Per J Tube route daily   multivitamins with minerals (CERTAVITE/CEROVITE) LIQD liquid 15 mLs by Per J Tube route daily   fiber modular (NUTRISOURCE FIBER) packet 1 packet by Per Feeding Tube route 3 times daily   calcium carbonate 1250 (500 CA) MG/5ML SUSP suspension 5 mLs (1,250 mg) by Per J Tube route 3 times daily (with meals)   levothyroxine (SYNTHROID) 25 mcg/mL 6 mLs (150 mcg) by Per J Tube route every morning (before breakfast) (Patient taking differently: 150 mcg by Per J Tube route every morning (before breakfast) Hold tube feeding 1 hour prior and 1 hour after medication administered.)   hydrocortisone (CORTEF) 2 mg/mL 10 mLs (20 mg) by Per J Tube route every morning   hydrocortisone (CORTEF) 2 mg/mL Take 5 mLs (10 mg) by mouth every evening   bisacodyl (DULCOLAX) 10 MG Suppository Place 1 suppository (10 mg) rectally daily as needed for constipation   miconazole (MICATIN; MICRO GUARD) 2 % powder Apply topically every hour as needed for other (topical candidiasis)   ACETAMINOPHEN  mg by Per Feeding Tube route every 4 hours as needed for pain   testosterone cypionate (DEPOTESTOTERONE CYPIONATE) 200 MG/ML injection Inject 100 mg into the muscle See Admin Instructions Every three weeks. Unknown due date.              Physical Examination:   Temp:  [98.7  F (37.1  C)-101.4  F (38.6  C)] 100  F (37.8  C)  Heart Rate:  [112-147] 113  Resp:  [8-44] 18  BP: ()/(43-86) 91/68  FiO2 (%):  [40 %] 40 %  SpO2:  [88 %-100 %] 95 %  No intake or output data in the 24 hours ending 02/14/18 1224  Wt Readings from Last 4 Encounters:   02/14/18 74.8 kg (165 lb)   12/31/17 76.2 kg (168 lb)   10/07/17 77.3 kg (170 lb 6.7 oz)   07/27/17 73.9 kg (163 lb)     BP - Mean:  [50-94] 77  FiO2 (%): 40 %  Resp: 18  No lab results found in last 7 days.    GEN: no acute distress   HEENT: head ncat, sclera anicteric, OP patent, trachea midline   PULM: unlabored synchronous with vent, clear anteriorly    CV/COR: RRR S1S2 no  gallop,  No rub, no murmur  ABD: soft nontender, hypoactive bowel sounds, no mass  EXT:  Edema   warm  NEURO: grossly intact  SKIN: no obvious rash  LINES: clean, dry intact         Data:   All data and imaging reviewed     ROUTINE ICU LABS (Last four results)  CMP  Recent Labs  Lab 02/14/18  0350      POTASSIUM 3.8   CHLORIDE 100   CO2 31   ANIONGAP 7   *   BUN 20   CR 1.09   GFRESTIMATED 70   GFRESTBLACK 85   STEVE 8.8   PROTTOTAL 8.0   ALBUMIN 3.3*   BILITOTAL 0.5   ALKPHOS 98   AST 33   ALT 36     CBC  Recent Labs  Lab 02/14/18  0350   WBC 14.8*   RBC 5.09   HGB 16.0   HCT 46.3   MCV 91   MCH 31.4   MCHC 34.6   RDW 13.1   *     INRNo lab results found in last 7 days.  Arterial Blood GasNo lab results found in last 7 days.    All cultures:    Recent Labs  Lab 02/14/18  0450 02/14/18  0350   CULT No growth after 2 hours No growth after 2 hours     Recent Results (from the past 24 hour(s))   Chest  XR, 1 view PORTABLE    Narrative    CHEST SINGLE VIEW PORTABLE  2/14/2018 5:30 AM     HISTORY: Shortness of breath, fever and hypoxia.    COMPARISON: 1/6/2018.    FINDINGS: Hypoinflated lungs. A small to moderate-sized region of hazy  opacities in the inferior right lung. The lungs are otherwise clear.  Normal-sized cardiac silhouette.      Impression    IMPRESSION: A small to moderate-sized region of hazy opacities in the  inferior right lung, new since 1/6/2018. This likely represents  pneumonia.    ELODIA ANN MD         Billing: This patient is critically ill: Yes. Total critical care time today 38 min.

## 2018-02-14 NOTE — ED NOTES
Pt has hx of TBI lives at home with his mother. Parent noted increased SOB and temp of 104. Pt given tylenol per Gtube PTA by family. Pt has hx of pneumonia and influenza. SAO2 88% on room air

## 2018-02-14 NOTE — IP AVS SNAPSHOT
` [place] [address] ONCOLOGY: [phone]            Medication Administration Report for Keyon Farias as of 02/21/18 1332   Legend:    Given Hold Not Given Due Canceled Entry Other Actions    Time Time (Time) Time  Time-Action       Inactive    Active    Linked        Medications 02/15/18 02/16/18 02/17/18 02/18/18 02/19/18 02/20/18 02/21/18    artificial saliva (BIOTENE MT) spray 1 spray  Dose: 1 spray  Freq: EVERY 6 HOURS PRN Route: MT  PRN Reason: dry mouth  Start: 02/18/18 1544       1845 (1 spray)-Given              bisacodyl (DULCOLAX) Suppository 10 mg  Dose: 10 mg  Freq: DAILY PRN Route: RE  PRN Reason: constipation  Start: 02/14/18 1234   Admin Instructions: Hold for loose stools.  This is the third step of a three step constipation treatment.               Brivaracetam (BRIVIACT) solution 100 mg  Dose: 100 mg  Freq: 2 TIMES DAILY Route: PER J TUBE  Start: 02/15/18 2100    2050 (100 mg)-Given        0920 (100 mg)-Given       2202 (100 mg)-Given        0945 (100 mg)-Given       2203 (100 mg)-Given [C]        0831 (100 mg)-Given       2130 (100 mg)-Given        1113 (100 mg)-Given       2326 (100 mg)-Given        1040 (100 mg)-Given       2242 (100 mg)-Given        0948 (100 mg)-Given       [ ] 2100           carBAMazepine (TEGretol) suspension 150 mg  Dose: 150 mg  Freq: EVERY 6 HOURS Route: PER J TUBE  Start: 02/16/18 1200   Admin Instructions: Shake well.      1159 (150 mg)-Given       1700 (150 mg)-Given        0010 (150 mg)-Given       0527 (150 mg)-Given       1226 (150 mg)-Given       1843 (150 mg)-Given        0109 (150 mg)-Given       0651 (150 mg)-Given       1553 (150 mg)-Given       1846 (150 mg)-Given        0120 (150 mg)-Given       0612 (150 mg)-Given       1146 (150 mg)-Given       1805 (150 mg)-Given        0030 (150 mg)-Given       0611 (150 mg)-Given       1347 (150 mg)-Given       1839 (150 mg)-Given        0104 (150 mg)-Given       0603 (150 mg)-Given       1157 (150  mg)-Given       [ ] 1800           cholecalciferol (vitamin D3) tablet 2,000 Units  Dose: 2,000 Units  Freq: DAILY Route: PER J TUBE  Start: 02/16/18 0900     0801 (2,000 Units)-Given        0943 (2,000 Units)-Given        0831 (2,000 Units)-Given        1145 (2,000 Units)-Given        1016 (2,000 Units)-Given        0948 (2,000 Units)-Given           dextrose 10 % 1,000 mL infusion  Freq: CONTINUOUS PRN Route: IV  PRN Comment: Hypoglycemia prevention  Start: 02/15/18 1252   Admin Instructions: For Hypoglycemia Prevention for patients on long-acting subcutaneous basal insulin (Glargine, Detemir, NPH) or continuous insulin infusion. Whenever nutrition support is held or interrupted:   1) Infuse IV D10W at nutrition support rate  2) Notify provider for further instructions               fiber modular (NUTRISOURCE FIBER) packet 1 packet  Dose: 1 packet  Freq: 3 TIMES DAILY Route: PER FEEDING   Start: 02/14/18 1645   Admin Instructions: Mix each packet with 60 mL water before administering. SUPPLIED BY NUTRITION DEPARTMENT.     (1341)-Not Given       1453 (1 packet)-Given              2103 (1 packet)-Given        0800 (1 packet)-Given       1501 (1 packet)-Given       2209 (1 packet)-Given               1229 (1 packet)-Given       1638 (1 packet)-Given       2143 (1 packet)-Given        (0900)-Not Given [C]       1557 (1 packet)-Given       2122 (1 packet)-Given        (0900)-Not Given       1801 (1 packet)-Given       (2330)-Not Given        (0900)-Not Given       1839 (1 packet)-Given       2242 (1 packet)-Given        1158 (1 packet)-Given       [ ] 1800       [ ] 2200           glucose 40 % gel 15-30 g  Dose: 15-30 g  Freq: EVERY 15 MIN PRN Route: PO  PRN Reason: low blood sugar  Start: 02/15/18 1106   Admin Instructions: Give 15 g for BG 51 to 69 mg/dL IF patient is conscious and able to swallow. Give 30 g for BG less than or equal to 50 mg/dL IF patient is conscious and able to swallow. Do NOT give glucose gel via  enteral tube.  IF patient has enteral tube: give apple juice 120 mL (4 oz or 15 g of CHO) via enteral tube for BG 51 to 69 mg/dL.  Give apple juice 240 mL (8 oz or 30 g of CHO) via enteral tube for BG less than or equal to 50 mg/dL.    ~Oral gel is preferable for conscious and able to swallow patient.   ~IF gel unavailable or patient refuses may provide apple juice 120 mL (4 oz or 15 g of CHO). Document juice on I and O flowsheet.              Or  dextrose 50 % injection 25-50 mL  Dose: 25-50 mL  Freq: EVERY 15 MIN PRN Route: IV  PRN Reason: low blood sugar  Start: 02/15/18 1106   Admin Instructions: Use if have IV access, BG less than 70 mg/dL and meet dose criteria below:  Dose if conscious and alert (or disorientated) and NPO = 25 mL  Dose if unconscious / not alert = 50 mL  Vesicant. For ordered doses up to 25 mg, give IV Push undiluted. Give each 5g over 1 minute.              Or  glucagon injection 1 mg  Dose: 1 mg  Freq: EVERY 15 MIN PRN Route: SC  PRN Reason: low blood sugar  PRN Comment: May repeat x 1 only  Start: 02/15/18 1106   Admin Instructions: May give SQ or IM. ONLY use glucagon IF patient has NO IV access AND is UNABLE to swallow AND blood glucose is LESS than or EQUAL to 50 mg/dL.  Give IV Push over 1 minute. Reconstitute with 1mL sterile water.               heparin lock flush 10 UNIT/ML injection 2-5 mL  Dose: 2-5 mL  Freq: ONCE PRN Route: IK  PRN Reason: line flush  PRN Comment: for locking each dormant lumen with line placement  Start: 02/14/18 1224   Admin Instructions: May repeat x 1               hydrocortisone (CORTEF) tablet 10 mg  Dose: 10 mg  Freq: EVERY EVENING Route: PER J TUBE  Start: 02/15/18 1800    1701 (10 mg)-Given        1700 (10 mg)-Given        1843 (10 mg)-Given        1846 (10 mg)-Given        1808 (10 mg)-Given        2106 (10 mg)-Given        [ ] 1800           hydrocortisone (CORTEF) tablet 20 mg  Dose: 20 mg  Freq: EVERY MORNING Route: PER J TUBE  Start: 02/15/18 0900     0850 (20 mg)-Given        0800 (20 mg)-Given        0943 (20 mg)-Given        0831 (20 mg)-Given        1145 (20 mg)-Given        1017 (20 mg)-Given        0949 (20 mg)-Given           insulin aspart (NovoLOG) inj (RAPID ACTING)  Dose: 1-6 Units  Freq: EVERY 4 HOURS Route: SC  Start: 02/15/18 1115   Admin Instructions: Correction Scale - MEDIUM INSULIN RESISTANCE DOSING     Do Not give Correction Insulin if BG less than 140.  For  - 189 give 1 unit.  For  - 239 give 2 units.  For  - 289 give 3 units.  For  - 339 give 4 units.  For  - 399 give 5 units.  For BG greater than or equal to 400 give 6 units.  Check blood glucose Q4H and administer based on blood glucose.  Notify provider if glucose greater than or equal to 350 mg/dL after administration of correction dose.  If given at mealtime, must be administered 5 min before meal or immediately after.     (1341)-Not Given       (1647)-Not Given       1958 (1 Units)-Given       (2320)-Not Given        0403 (1 Units)-Given       (0751)-Not Given       1159 (1 Units)-Given       1504 (1 Units)-Given       2028 (1 Units)-Given        0012 (1 Units)-Given [C]       0437 (1 Units)-Given       (0941)-Not Given       1226 (1 Units)-Given [C]       (1623)-Not Given [C]       2142 (1 Units)-Given        0109 (1 Units)-Given [C]       (0507)-Not Given [C]       (0832)-Not Given       (1200)-Not Given       (1803)-Not Given       (2124)-Not Given [C]        0121 (1 Units)-Given [C]       (0422)-Not Given [C]       (0800)-Not Given       (1215)-Not Given [C]       (1834)-Not Given       (2308)-Not Given        (0043)-Not Given       (0347)-Not Given       (0800)-Not Given [C]       (1348)-Not Given [C]       (1723)-Not Given       (2120)-Not Given [C]        (0047)-Not Given [C]       0414 (1 Units)-Given [C]       (1000)-Not Given       [ ] 1200       [ ] 1600       [ ] 2000           ipratropium - albuterol 0.5 mg/2.5 mg/3 mL (DUONEB) neb solution  3 mL  Dose: 3 mL  Freq: 4 TIMES DAILY Route: NEBULIZATION  Start: 02/14/18 1545    0655 (3 mL)-Given       (1140)-Not Given       (1553)-Not Given       (1858)-Not Given        (0658)-Not Given       1207 (3 mL)-Given       1534 (3 mL)-Given       2001 (3 mL)-Given        0712 (3 mL)-Given       1130 (3 mL)-Given       1623 (3 mL)-Given       (2000)-Not Given        0656 (3 mL)-Given       1151 (3 mL)-Given       1713 (3 mL)-Given       1909 (3 mL)-Given        0719 (3 mL)-Given       1042 (3 mL)-Given       1513 (3 mL)-Given       1929 (3 mL)-Given        0726 (3 mL)-Given       1148 (3 mL)-Given       1510 (3 mL)-Given       1948 (3 mL)-Given        0748 (3 mL)-Given       1109 (3 mL)-Given       [ ] 1500       [ ] 1900           lactated ringers BOLUS 500 mL  Dose: 500 mL  Freq: ONCE PRN Route: IV  PRN Reason: other  PRN Comment: IF hypotension or hemorrhage.  Start: 02/14/18 1231   Admin Instructions: Administer quickly to patient tolerance IF positioning and increased IV rate are ineffective in patient with massive hemorrhage and hypotension. Discontinue upon discharge from ICU.               levothyroxine (SYNTHROID/LEVOTHROID) tablet 150 mcg  Dose: 150 mcg  Freq: DAILY Route: PER J TUBE  Start: 02/14/18 1800   Admin Instructions: Separate oral administration of iron- or calcium-containing products and levothyroxine by at least 4 hours.     0600 (150 mcg)-Given        0525 (150 mcg)-Given               0657 (150 mcg)-Given        0831 (150 mcg)-Given        0612 (150 mcg)-Given        0611 (150 mcg)-Given        0603 (150 mcg)-Given           lidocaine (LMX4) cream  Freq: ONCE PRN Route: Top  PRN Reason: moderate pain  PRN Comment: for local anesthetic during PICC insertion  Start: 02/14/18 1224   Admin Instructions: Apply to PICC Insertion Site. Apply 30 minutes prior to procedure. MAX Dose: 2.5 gm  (1/2 of 5 gm tube)                 lidocaine 1 % 0.5-5 mL  Dose: 0.5-5 mL  Freq: ONCE PRN Route: OTHER  PRN  Comment: mild pain For local anesthetic during PICC insertion.  Start: 02/14/18 1224   Admin Instructions: Give Sub-Q/Intradermal.  Give in divided doses as needed.               magnesium sulfate 2 g in NS intermittent infusion (PharMEDium or [facility] Cmpd)  Dose: 2 g  Freq: DAILY PRN Route: IV  PRN Reason: magnesium supplementation  Start: 02/14/18 1236   Admin Instructions: For Serum Mg++ 1.6 - 2 mg/dL  Give 2 g and recheck magnesium level next AM.     0329 (2 g)-New Bag           0613 (2 g)-New Bag             magnesium sulfate 4 g in 100 mL sterile water (premade)  Dose: 4 g  Freq: EVERY 4 HOURS PRN Route: IV  PRN Reason: magnesium supplementation  Start: 02/14/18 1236   Admin Instructions: For serum Mg++ less than 1.6 mg/dL  Give 4 g and recheck magnesium level 2 hours after dose, and next AM.               melatonin tablet 6 mg  Dose: 6 mg  Freq: AT BEDTIME PRN Route: PO  PRN Reason: sleep  Start: 02/14/18 1631              miconazole (MICATIN; MICRO GUARD) 2 % powder  Freq: EVERY 1 HOUR PRN Route: Top  PRN Reason: other  PRN Comment: topical candidiasis  Start: 02/18/18 1544   Admin Instructions: Apply to affected area.          1841 ( )-Given              multivitamins with minerals (CERTAVITE/CEROVITE) liquid 15 mL  Dose: 15 mL  Freq: DAILY Route: PER FEEDING   Start: 02/15/18 1300    1454 (15 mL)-Given        0801 (15 mL)-Given        1226 (15 mL)-Given        0831 (15 mL)-Given        1114 (15 mL)-Given        1020 (15 mL)-Given        0947 (15 mL)-Given           naloxone (NARCAN) injection 0.1-0.4 mg  Dose: 0.1-0.4 mg  Freq: EVERY 2 MIN PRN Route: IV  PRN Reason: opioid reversal  Start: 02/14/18 1226   Admin Instructions: For respiratory rate LESS than or EQUAL to 8.  Partial reversal dose:  0.1 mg titrated q 2 minutes for Analgesia Side Effects Monitoring Sedation Level of 3 (frequently drowsy, arousable, drifts to sleep during conversation).Full reversal dose:  0.4 mg bolus for Analgesia Side Effects  Monitoring Sedation Level of 4 (somnolent, minimal or no response to stimulation).  For ordered doses up to 2mg give IVP. Give each 0.4mg over 15 seconds in emergency situations. For non-emergent situations further dilute in 9mL of NS to facilitate titration of response.               nitroGLYcerin (NITROSTAT) sublingual tablet 0.4 mg  Dose: 0.4 mg  Freq: EVERY 5 MIN PRN Route: SL  PRN Reason: chest pain  Start: 02/16/18 1616   Admin Instructions: Maximum 3 doses in 15 minutes.  Notify provider if no relief after 3 doses.    Do NOT give nitroglycerin SL IF the patient has taken avanafil (STENDRA), sildenafil (VIAGRA) or (REVATIO) within the last 8 hours, vardenafil (LEVITRA) or (STAXYN) within the last 18 hours, tadalafil (CIALIS) or (ADCIRCA) within the last 36 hours. Inform provider if patient has taken one of these medications.  If patient is still having acute angina requiring treatment, an alternative treatment option may be used such as: IV beta-blocker [2.5 mg - 5 mg metoprolol (LOPRESSOR)] if ordered by a provider.               ondansetron (ZOFRAN) injection 4 mg  Dose: 4 mg  Freq: EVERY 6 HOURS PRN Route: IV  PRN Reasons: nausea,vomiting  Start: 02/16/18 0540   Admin Instructions: Irritant. For ordered doses up to 4 mg, give IV Push undiluted over 2-5 minutes.      0733 (4 mg)-Given          1752 (4 mg)-Given       2325 (4 mg)-Given             pantoprazole (PROTONIX) suspension 40 mg  Dose: 40 mg  Freq: 2 TIMES DAILY Route: ORAL OR FEED  Start: 02/21/18 0900   Admin Instructions: Pharmacist Dose Change per: IV-PO Policy.           1157 (40 mg)-Given       [ ] 2100           piperacillin-tazobactam (ZOSYN) infusion 3.375 g  Dose: 3.375 g  Freq: EVERY 6 HOURS Route: IV  Indications of Use: SEPSIS  Last Dose: 3.375 g (02/21/18 0624)  Start: 02/16/18 2200     2202 (3.375 g)-New Bag        0437 (3.375 g)-New Bag       1050 (3.375 g)-New Bag       1635 (3.375 g)-New Bag       2130 (3.375 g)-New Bag        0507  (3.375 g)-New Bag       1115 (3.375 g)-New Bag       1554 (3.375 g)-New Bag       2122 (3.375 g)-New Bag        0424 (3.375 g)-New Bag       1146 (3.375 g)-New Bag       1618 (3.375 g)-New Bag       2330 (3.375 g)-New Bag        0559 (3.375 g)-New Bag       1348 (3.375 g)-New Bag       1832 (3.375 g)-New Bag        0104 (3.375 g)-New Bag       0624 (3.375 g)-New Bag       (1231)-Not Given       [ ] 1800           polyethylene glycol (MIRALAX/GLYCOLAX) Packet 17 g  Dose: 17 g  Freq: DAILY PRN Route: PO  PRN Reason: constipation  Start: 02/14/18 1234   Admin Instructions: Give in 8oz of  water, juice, or soda. Hold for loose stools.  This is the second step of a three step constipation treatment.  1 Packet = 17 grams. Mixed prescribed dose in 8 ounces of water. Follow with 8 oz. of water.               potassium chloride (KLOR-CON) Packet 20-40 mEq  Dose: 20-40 mEq  Freq: EVERY 2 HOURS PRN Route: ORAL OR FEED  PRN Reason: potassium supplementation  Start: 02/14/18 1236   Admin Instructions: Use if unable to tolerate tablets.    If Serum K+ 3.4-4.0, dose = 20 mEq x1. Recheck K+ level the next AM.  If Serum K+ 3.0-3.3, dose = 60 mEq po total dose (40 mEq x 1 followed in 2 hours by 20 mEq X1). Recheck K+ level 4 hours after dose and the next AM.  If Serum K+ 2.5-2.9, dose = 80 mEq po total dose (40 mEq Q2H x2). Recheck K+ level 4 hours after dose and the next AM.  If Serum K+ less than 2.5, See IV order.  Dissolve packet contents in 4-8 ounces of cold water or juice.     2058 (20 mEq)-Given        0801 (20 mEq)-Given       1659 (20 mEq)-Given        1226 (20 mEq)-Given        2121 (20 mEq)-Given        0613 (40 mEq)-Given       1111 (20 mEq)-Given [C]             potassium chloride 10 mEq in 100 mL intermittent infusion with 10 mg lidocaine  Dose: 10 mEq  Freq: EVERY 1 HOUR PRN Route: IV  PRN Reason: potassium supplementation  Start: 02/14/18 1236   Admin Instructions: Infuse via PERIPHERAL LINE. Use potassium with  lidocaine for pain with peripheral administration.  If Serum K+ 3.4-4.0, dose = 10 mEq/hr x2 doses. Recheck K+ level the next AM.  If Serum K+ 3.0-3.3, dose = 10 mEq/hr x4 doses (40 mEq IV total dose). Recheck K+ level 2 hours after dose and the next AM.  If Serum K+ less than 3.0, dose = 10 mEq/hr x6 doses (60 mEq IV total dose). Recheck K+ level 2 hours after dose and the next AM.               potassium chloride 10 mEq in 100 mL sterile water intermittent infusion (premix)  Dose: 10 mEq  Freq: EVERY 1 HOUR PRN Route: IV  PRN Reason: potassium supplementation  Start: 02/14/18 1236   Admin Instructions: Infuse via PERIPHERAL LINE or CENTRAL LINE. Use for central line replacement if patient weight less than 65 kg, if patient is on TPN with high potassium content or if unit does not stock 20 mEq bags.  If Serum K+ 3.4-4.0, dose = 10 mEq/hr x2 doses. Recheck K+ level the next AM.  If Serum K+ 3.0-3.3, dose = 10 mEq/hr x4 doses (40 mEq IV total dose). Recheck K+ level 2 hours after dose and the next AM.  If Serum K+ less than 3.0, dose = 10 mEq/hr x6 doses (60 mEq IV total dose). Recheck K+ level 2 hours after dose and the next AM.               potassium chloride 20 mEq in 50 mL intermittent infusion  Dose: 20 mEq  Freq: EVERY 1 HOUR PRN Route: IV  PRN Reason: potassium supplementation  Start: 02/14/18 1236   Admin Instructions: Infuse via CENTRAL LINE Only.  May need EKG if less than 65 kg or on TPN - Max rate is 0.3 mEq/kg/hr for patients not on EKG monitoring.    If Serum K+ 3.4-4.0, dose = 20 mEq/hr x1 doses. Recheck K+ level the next AM.  If Serum K+ 3.0-3.3, dose = 20 mEq/hr x2 doses (40 mEq IV total dose).  Recheck K+ level 2 hours after dose and the next AM.  If Serum K+ less than 3.0, dose = 20 mEq/hr x3 doses (60 mEq IV total dose). Recheck K+ level 2 hours after dose and the next AM.          0045 (20 mEq)-New Bag       1021 (20 mEq)-New Bag [C]            potassium chloride SA (K-DUR/KLOR-CON M) CR tablet  20-40 mEq  Dose: 20-40 mEq  Freq: EVERY 2 HOURS PRN Route: PO  PRN Reason: potassium supplementation  Start: 02/14/18 1236   Admin Instructions: Use if able to take PO.   If Serum K+ 3.4-4.0, dose = 20 mEq x1. Recheck K+ level the next AM.  If Serum K+ 3.0-3.3, dose = 60 mEq po total dose (40 mEq x1 followed in 2 hours by 20 mEq x1). Recheck K+ level 4 hours after dose and the next AM.  If Serum K+ 2.5-2.9, dose = 80 mEq po total dose (40 mEq Q2H x2). Recheck K+ level 4 hours after dose and the next AM.  If Serum K+ less than 2.5, See IV order.  DO NOT CRUSH           (1217)-Not Given [C]           potassium phosphate 10 mmol in D5W 250 mL intermittent infusion  Dose: 10 mmol  Freq: DAILY PRN Route: IV  PRN Reason: phosphorous supplementation  Start: 02/14/18 1236   Admin Instructions: For serum phosphorus level 2.5-2.7  Do not infuse Phosphorus in the same line as TPN.   Give 10 mmol and recheck phosphorus level the next AM.        2207 (10 mmol)-New Bag              potassium phosphate 15 mmol in D5W 250 mL intermittent infusion  Dose: 15 mmol  Freq: DAILY PRN Route: IV  PRN Reason: phosphorous supplementation  Start: 02/14/18 1236   Admin Instructions: For serum phosphorus level 2.0-2.4  Do not infuse Phosphorus in the same line as TPN.   Give 15 mmol and recheck phosphorus level next AM.     0333 (15 mmol)-New Bag                 potassium phosphate 20 mmol in D5W 250 mL intermittent infusion  Dose: 20 mmol  Freq: EVERY 6 HOURS PRN Route: IV  PRN Reason: phosphorous supplementation  Start: 02/14/18 1236   Admin Instructions: For serum phosphorus level 1.1-1.9  For CENTRAL Line ONLY  Do not infuse Phosphorus in the same line as TPN.   Give 20 mmol and recheck phosphorus level 2 hours after dose and next AM.               potassium phosphate 20 mmol in D5W 500 mL intermittent infusion  Dose: 20 mmol  Freq: EVERY 6 HOURS PRN Route: IV  PRN Reason: phosphorous supplementation  Start: 02/14/18 1236   Admin  Instructions: For serum phosphorus level 1.1-1.9  For peripheral line  Do not infuse Phosphorus in the same line as TPN.   Give 20 mmol and recheck phosphorus level 2 hours after dose and next AM. Repeat if necessary.               potassium phosphate 25 mmol in D5W 500 mL intermittent infusion  Dose: 25 mmol  Freq: EVERY 8 HOURS PRN Route: IV  PRN Reason: phosphorous supplementation  Start: 02/14/18 1236   Admin Instructions: For serum phosphorus level less than 1.1  Do not infuse Phosphorus in the same line as TPN.   Give 25 mmol and recheck phosphorus level 2 hours after dose and next AM.               sodium chloride (PF) 0.9% PF flush 10 mL  Dose: 10 mL  Freq: EVERY 8 HOURS Route: IK  Start: 02/14/18 1330   Admin Instructions: And Q1H PRN, to lock CVC - Open Ended (Tunneled and Non-Tunneled) dormant lumen.     0557 (10 mL)-Given       (1304)-Not Given       2031 (10 mL)-Given        0507 (10 mL)-Given       (1251)-Not Given       2209 (10 mL)-Given        (0648)-Not Given       1054 (10 mL)-Given              2143 (10 mL)-Given        0510 (10 mL)-Given       (1556)-Not Given       2122 (10 mL)-Given        (0431)-Not Given       (1400)-Not Given [C]       2329 (20 mL)-Given        0601 (20 mL)-Given       0619 (10 mL)-Given       1349 (10 mL)-Given       2107 (10 mL)-Given        0608 (10 mL)-Given       [ ] 1400       [ ] 2200           sodium chloride (PF) 0.9% PF flush 10 mL  Dose: 10 mL  Freq: EVERY 8 HOURS Route: IK  Start: 02/14/18 1245   Admin Instructions: And Q1H PRN, to lock CVC - Open Ended (Tunneled and Non-Tunneled) dormant lumen.     0347 (10 mL)-Given       (1303)-Not Given       1955 (10 mL)-Given        0403 (10 mL)-Given       1200 (10 mL)-Given       2031 (10 mL)-Given        (0648)-Not Given                      0510 (10 mL)-Given       (1556)-Not Given       (2123)-Not Given        0424 (10 mL)-Given       1147 (10 mL)-Given       (2308)-Not Given        0619 (10 mL)-Given       1349 (10  mL)-Given       2242 (10 mL)-Given        0608 (10 mL)-Given       [ ] 1400       [ ] 2200           sodium chloride (PF) 0.9% PF flush 10-20 mL  Dose: 10-20 mL  Freq: EVERY 1 HOUR PRN Route: IK  PRN Reasons: line flush,post meds or blood draw  Start: 02/14/18 1233   Admin Instructions: to flush CVC - Open Ended (Tunneled and Non-Tunneled).  10 mL post IV meds; 20 mL post blood draw.         2328 (20 mL)-Given        0020 (10 mL)-Given            sodium chloride (PF) 0.9% PF flush 5-50 mL  Dose: 5-50 mL  Freq: ONCE PRN Route: IK  PRN Reason: line flush  PRN Comment: to flush each lumen with line placement  Start: 02/14/18 1224   Admin Instructions: May repeat x 1              Completed Medications  Medications 02/15/18 02/16/18 02/17/18 02/18/18 02/19/18 02/20/18 02/21/18         Dose: 50 mL  Freq: ONCE Route: PER G TUBE  Start: 02/19/18 0830   End: 02/19/18 0830        0830 (10 mL)-Given [C]            Discontinued Medications  Medications 02/15/18 02/16/18 02/17/18 02/18/18 02/19/18 02/20/18 02/21/18         Rate: 10 mL/hr   Freq: CONTINUOUS Route: IV  Last Dose: Stopped (02/18/18 1554)  Start: 02/14/18 0403   End: 02/18/18 1538   Admin Instructions: Start after NS bolus.     0931 ( )-Rate/Dose Verify        1204 ( )-New Bag        0016 ( )-Rate/Dose Verify       1053 ( )-New Bag       1100 ( )-Rate/Dose Verify        0000 ( )-Rate/Dose Verify       0400 ( )-Rate/Dose Verify       0700 ( )-Rate/Dose Verify       1538-Med Discontinued  1554-Stopped                Dose: 2 mg  Freq: EVERY 2 HOURS Route: IV  Start: 02/19/18 1800   End: 02/19/18 2159   Admin Instructions: Use 10 mL syringe to draw up 2 ML into clotted catheter & allow to dwell for 30 mins, then DRAW BACK and discard contents to assess catheter function. If still occluded, allow mixture to dwell for an additional 90 mins, then DRAW BACK and discard contents to reassess catheter function.  May repeat dose if occlusion persists.  Contact MD if 2nd dose is  unsuccessful. Only use a 10 ml syringe to administer the diluted solution into each lumen. For catheters with lumen volumes greater than the volume dispensed, request additional syringe(s) from pharmacy. To prevent inadvertent embolization of thrombus from the catheter, ALWAYS WITHDRAW tPA at the end of the dwell time before administering any solution through the catheter.         2159-Med Discontinued  2231 (4 mg)-Given [C]       2159-Med Discontinued                 Dose: 81 mg  Freq: DAILY Route: PER J TUBE  Start: 02/14/18 1800   End: 02/19/18 1558    0852 (81 mg)-Given        0801 (81 mg)-Given        0942 (81 mg)-Given        0831 (81 mg)-Given        1144 (81 mg)-Given       1558-Med Discontinued           Rate: 100 mL/hr   Freq: CONTINUOUS Route: IV  Start: 02/18/18 1545   End: 02/19/18 1018       1600 ( )-New Bag       2121 ( )-Rate/Dose Verify        0426 ( )-New Bag       1018-Med Discontinued           Start: 02/19/18 0734   End: 02/19/18 0829   Admin Instructions: Corinna Medley : cabinet override         0829-Med Discontinued           Dose: 40 mg  Freq: 2 TIMES DAILY Route: IV  Start: 02/19/18 2100   End: 02/21/18 0742   Admin Instructions: Irritant. For ordered doses up to 40 mg, give IV Push over 2 minutes when reconstituted with 10mL NS.         2028 (40 mg)-Given        1015 (40 mg)-Given       2106 (40 mg)-Given        0742-Med Discontinued         Dose: 150 mg  Freq: 2 TIMES DAILY Route: ORAL OR FEED  Start: 02/18/18 2100   End: 02/20/18 1605       2121 (150 mg)-Given        1146 (150 mg)-Given       2028 (150 mg)-Given        (1018)-Not Given       1605-Med Discontinued     Medications 02/15/18 02/16/18 02/17/18 02/18/18 02/19/18 02/20/18 02/21/18

## 2018-02-14 NOTE — IP AVS SNAPSHOT
[address], [address]    [facility] [address]    Phone:  [phone]                                       After Visit Summary   2/14/2018    Keyon Farias    MRN: [id number]           After Visit Summary Signature Page     I have received my discharge instructions, and my questions have been answered. I have discussed any challenges I see with this plan with the nurse or doctor.    ..........................................................................................................................................  Patient/Patient Representative Signature      ..........................................................................................................................................  Patient Representative Print Name and Relationship to Patient    ..................................................               ................................................  Date                                            Time    ..........................................................................................................................................  Reviewed by Signature/Title    ...................................................              ..............................................  Date                                                            Time

## 2018-02-15 ENCOUNTER — APPOINTMENT (OUTPATIENT)
Dept: PHYSICAL THERAPY | Facility: CLINIC | Age: 58
DRG: 871 | End: 2018-02-15
Attending: ANESTHESIOLOGY
Payer: MEDICARE

## 2018-02-15 LAB
ANION GAP SERPL CALCULATED.3IONS-SCNC: 5 MMOL/L (ref 3–14)
BACTERIA SPEC CULT: NO GROWTH
BASE EXCESS BLDA CALC-SCNC: 0.8 MMOL/L
BASE EXCESS BLDV CALC-SCNC: 2.2 MMOL/L
BASE EXCESS BLDV CALC-SCNC: 2.4 MMOL/L
BASE EXCESS BLDV CALC-SCNC: 2.9 MMOL/L
BASE EXCESS BLDV CALC-SCNC: 2.9 MMOL/L
BASOPHILS # BLD AUTO: 0.1 10E9/L (ref 0–0.2)
BASOPHILS NFR BLD AUTO: 0.3 %
BUN SERPL-MCNC: 12 MG/DL (ref 7–30)
CA-I BLD-MCNC: 4.3 MG/DL (ref 4.4–5.2)
CA-I BLD-MCNC: 4.6 MG/DL (ref 4.4–5.2)
CA-I BLD-MCNC: 4.7 MG/DL (ref 4.4–5.2)
CA-I BLD-MCNC: 5 MG/DL (ref 4.4–5.2)
CA-I BLD-MCNC: 5.2 MG/DL (ref 4.4–5.2)
CALCIUM SERPL-MCNC: 8.2 MG/DL (ref 8.5–10.1)
CHLORIDE SERPL-SCNC: 115 MMOL/L (ref 94–109)
CO2 SERPL-SCNC: 26 MMOL/L (ref 20–32)
CREAT SERPL-MCNC: 1.15 MG/DL (ref 0.66–1.25)
DIFFERENTIAL METHOD BLD: ABNORMAL
EOSINOPHIL # BLD AUTO: 0.1 10E9/L (ref 0–0.7)
EOSINOPHIL NFR BLD AUTO: 0.8 %
ERYTHROCYTE [DISTWIDTH] IN BLOOD BY AUTOMATED COUNT: 13.5 % (ref 10–15)
GFR SERPL CREATININE-BSD FRML MDRD: 66 ML/MIN/1.7M2
GLUCOSE BLDC GLUCOMTR-MCNC: 123 MG/DL (ref 70–99)
GLUCOSE BLDC GLUCOMTR-MCNC: 134 MG/DL (ref 70–99)
GLUCOSE BLDC GLUCOMTR-MCNC: 157 MG/DL (ref 70–99)
GLUCOSE BLDC GLUCOMTR-MCNC: 172 MG/DL (ref 70–99)
GLUCOSE BLDC GLUCOMTR-MCNC: 173 MG/DL (ref 70–99)
GLUCOSE BLDC GLUCOMTR-MCNC: 216 MG/DL (ref 70–99)
GLUCOSE SERPL-MCNC: 123 MG/DL (ref 70–99)
GLUCOSE SERPL-MCNC: 135 MG/DL (ref 70–99)
GLUCOSE SERPL-MCNC: 142 MG/DL (ref 70–99)
GLUCOSE SERPL-MCNC: 223 MG/DL (ref 70–99)
HBA1C MFR BLD: 6.6 % (ref 4.3–6)
HCO3 BLD-SCNC: 26 MMOL/L (ref 21–28)
HCO3 BLDV-SCNC: 28 MMOL/L (ref 21–28)
HCO3 BLDV-SCNC: 29 MMOL/L (ref 21–28)
HCT VFR BLD AUTO: 37 % (ref 40–53)
HGB BLD-MCNC: 12.2 G/DL (ref 13.3–17.7)
IMM GRANULOCYTES # BLD: 0 10E9/L (ref 0–0.4)
IMM GRANULOCYTES NFR BLD: 0.2 %
LACTATE BLD-SCNC: 1.4 MMOL/L (ref 0.7–2)
LACTATE BLD-SCNC: 1.6 MMOL/L (ref 0.7–2)
LACTATE BLD-SCNC: 2.1 MMOL/L (ref 0.7–2)
LACTATE BLD-SCNC: 2.1 MMOL/L (ref 0.7–2)
LACTATE BLD-SCNC: 2.6 MMOL/L (ref 0.7–2)
LYMPHOCYTES # BLD AUTO: 2.6 10E9/L (ref 0.8–5.3)
LYMPHOCYTES NFR BLD AUTO: 17.8 %
Lab: NORMAL
MAGNESIUM SERPL-MCNC: 1.8 MG/DL (ref 1.6–2.3)
MAGNESIUM SERPL-MCNC: 2.5 MG/DL (ref 1.6–2.3)
MCH RBC QN AUTO: 30.7 PG (ref 26.5–33)
MCHC RBC AUTO-ENTMCNC: 33 G/DL (ref 31.5–36.5)
MCV RBC AUTO: 93 FL (ref 78–100)
MONOCYTES # BLD AUTO: 1 10E9/L (ref 0–1.3)
MONOCYTES NFR BLD AUTO: 7.1 %
MRSA DNA SPEC QL NAA+PROBE: POSITIVE
NEUTROPHILS # BLD AUTO: 10.6 10E9/L (ref 1.6–8.3)
NEUTROPHILS NFR BLD AUTO: 73.8 %
NRBC # BLD AUTO: 0 10*3/UL
NRBC BLD AUTO-RTO: 0 /100
OXYHGB MFR BLD: 91 % (ref 92–100)
OXYHGB MFR BLDV: 76 %
OXYHGB MFR BLDV: 77 %
OXYHGB MFR BLDV: 78 %
OXYHGB MFR BLDV: 89 %
PCO2 BLD: 44 MM HG (ref 35–45)
PCO2 BLDV: 44 MM HG (ref 40–50)
PCO2 BLDV: 46 MM HG (ref 40–50)
PCO2 BLDV: 46 MM HG (ref 40–50)
PCO2 BLDV: 47 MM HG (ref 40–50)
PH BLD: 7.39 PH (ref 7.35–7.45)
PH BLDV: 7.39 PH (ref 7.32–7.43)
PH BLDV: 7.39 PH (ref 7.32–7.43)
PH BLDV: 7.4 PH (ref 7.32–7.43)
PH BLDV: 7.4 PH (ref 7.32–7.43)
PHOSPHATE SERPL-MCNC: 2.3 MG/DL (ref 2.5–4.5)
PHOSPHATE SERPL-MCNC: 2.6 MG/DL (ref 2.5–4.5)
PLATELET # BLD AUTO: 117 10E9/L (ref 150–450)
PO2 BLD: 61 MM HG (ref 80–105)
PO2 BLDV: 41 MM HG (ref 25–47)
PO2 BLDV: 43 MM HG (ref 25–47)
PO2 BLDV: 43 MM HG (ref 25–47)
PO2 BLDV: 56 MM HG (ref 25–47)
POTASSIUM SERPL-SCNC: 3.5 MMOL/L (ref 3.4–5.3)
POTASSIUM SERPL-SCNC: 3.6 MMOL/L (ref 3.4–5.3)
POTASSIUM SERPL-SCNC: 3.9 MMOL/L (ref 3.4–5.3)
POTASSIUM SERPL-SCNC: 3.9 MMOL/L (ref 3.4–5.3)
POTASSIUM SERPL-SCNC: 4.5 MMOL/L (ref 3.4–5.3)
RBC # BLD AUTO: 3.97 10E12/L (ref 4.4–5.9)
SODIUM SERPL-SCNC: 145 MMOL/L (ref 133–144)
SODIUM SERPL-SCNC: 146 MMOL/L (ref 133–144)
SODIUM SERPL-SCNC: 147 MMOL/L (ref 133–144)
SPECIMEN SOURCE: ABNORMAL
SPECIMEN SOURCE: NORMAL
WBC # BLD AUTO: 14.4 10E9/L (ref 4–11)

## 2018-02-15 PROCEDURE — 25000128 H RX IP 250 OP 636: Performed by: ANESTHESIOLOGY

## 2018-02-15 PROCEDURE — 82330 ASSAY OF CALCIUM: CPT | Performed by: ANESTHESIOLOGY

## 2018-02-15 PROCEDURE — 83036 HEMOGLOBIN GLYCOSYLATED A1C: CPT | Performed by: ANESTHESIOLOGY

## 2018-02-15 PROCEDURE — 40000239 ZZH STATISTIC VAT ROUNDS

## 2018-02-15 PROCEDURE — 94640 AIRWAY INHALATION TREATMENT: CPT

## 2018-02-15 PROCEDURE — 40000894 ZZH STATISTIC OT IP EVAL DEFER: Performed by: OCCUPATIONAL THERAPIST

## 2018-02-15 PROCEDURE — 25000132 ZZH RX MED GY IP 250 OP 250 PS 637: Mod: GY | Performed by: NURSE PRACTITIONER

## 2018-02-15 PROCEDURE — 83735 ASSAY OF MAGNESIUM: CPT | Performed by: ANESTHESIOLOGY

## 2018-02-15 PROCEDURE — 25000131 ZZH RX MED GY IP 250 OP 636 PS 637: Mod: GY | Performed by: NURSE PRACTITIONER

## 2018-02-15 PROCEDURE — 85025 COMPLETE CBC W/AUTO DIFF WBC: CPT | Performed by: ANESTHESIOLOGY

## 2018-02-15 PROCEDURE — 20000003 ZZH R&B ICU

## 2018-02-15 PROCEDURE — 27210429 ZZH NUTRITION PRODUCT INTERMEDIATE LITER

## 2018-02-15 PROCEDURE — G0463 HOSPITAL OUTPT CLINIC VISIT: HCPCS

## 2018-02-15 PROCEDURE — 40000257 ZZH STATISTIC CONSULT NO CHARGE VASC ACCESS

## 2018-02-15 PROCEDURE — 82805 BLOOD GASES W/O2 SATURATION: CPT | Performed by: ANESTHESIOLOGY

## 2018-02-15 PROCEDURE — 40000275 ZZH STATISTIC RCP TIME EA 10 MIN

## 2018-02-15 PROCEDURE — 87077 CULTURE AEROBIC IDENTIFY: CPT | Performed by: ANESTHESIOLOGY

## 2018-02-15 PROCEDURE — 25000125 ZZHC RX 250: Performed by: ANESTHESIOLOGY

## 2018-02-15 PROCEDURE — 84295 ASSAY OF SERUM SODIUM: CPT | Performed by: ANESTHESIOLOGY

## 2018-02-15 PROCEDURE — 40000901 ZZH STATISTIC WOC PT EDUCATION, 0-15 MIN

## 2018-02-15 PROCEDURE — 25000132 ZZH RX MED GY IP 250 OP 250 PS 637: Mod: GY | Performed by: ANESTHESIOLOGY

## 2018-02-15 PROCEDURE — 87641 MR-STAPH DNA AMP PROBE: CPT | Performed by: ANESTHESIOLOGY

## 2018-02-15 PROCEDURE — P9041 ALBUMIN (HUMAN),5%, 50ML: HCPCS | Performed by: ANESTHESIOLOGY

## 2018-02-15 PROCEDURE — 87186 SC STD MICRODIL/AGAR DIL: CPT | Performed by: ANESTHESIOLOGY

## 2018-02-15 PROCEDURE — 87640 STAPH A DNA AMP PROBE: CPT | Performed by: ANESTHESIOLOGY

## 2018-02-15 PROCEDURE — 84132 ASSAY OF SERUM POTASSIUM: CPT | Performed by: ANESTHESIOLOGY

## 2018-02-15 PROCEDURE — 40000193 ZZH STATISTIC PT WARD VISIT: Performed by: PHYSICAL THERAPIST

## 2018-02-15 PROCEDURE — 82947 ASSAY GLUCOSE BLOOD QUANT: CPT | Performed by: ANESTHESIOLOGY

## 2018-02-15 PROCEDURE — 00000146 ZZHCL STATISTIC GLUCOSE BY METER IP

## 2018-02-15 PROCEDURE — A9270 NON-COVERED ITEM OR SERVICE: HCPCS | Mod: GY | Performed by: NURSE PRACTITIONER

## 2018-02-15 PROCEDURE — 97530 THERAPEUTIC ACTIVITIES: CPT | Mod: GP | Performed by: PHYSICAL THERAPIST

## 2018-02-15 PROCEDURE — 84100 ASSAY OF PHOSPHORUS: CPT | Performed by: ANESTHESIOLOGY

## 2018-02-15 PROCEDURE — 25000125 ZZHC RX 250: Performed by: NURSE PRACTITIONER

## 2018-02-15 PROCEDURE — A9270 NON-COVERED ITEM OR SERVICE: HCPCS | Mod: GY | Performed by: ANESTHESIOLOGY

## 2018-02-15 PROCEDURE — 83605 ASSAY OF LACTIC ACID: CPT | Performed by: ANESTHESIOLOGY

## 2018-02-15 PROCEDURE — 99291 CRITICAL CARE FIRST HOUR: CPT | Performed by: ANESTHESIOLOGY

## 2018-02-15 PROCEDURE — 80048 BASIC METABOLIC PNL TOTAL CA: CPT | Performed by: ANESTHESIOLOGY

## 2018-02-15 RX ORDER — DEXTROSE MONOHYDRATE 25 G/50ML
25-50 INJECTION, SOLUTION INTRAVENOUS
Status: DISCONTINUED | OUTPATIENT
Start: 2018-02-15 | End: 2018-02-21 | Stop reason: HOSPADM

## 2018-02-15 RX ORDER — NICOTINE POLACRILEX 4 MG
15-30 LOZENGE BUCCAL
Status: DISCONTINUED | OUTPATIENT
Start: 2018-02-15 | End: 2018-02-21 | Stop reason: HOSPADM

## 2018-02-15 RX ORDER — HYDROCORTISONE 10 MG/1
10 TABLET ORAL EVERY EVENING
Status: DISCONTINUED | OUTPATIENT
Start: 2018-02-15 | End: 2018-02-21 | Stop reason: HOSPADM

## 2018-02-15 RX ORDER — ALBUMIN, HUMAN INJ 5% 5 %
25 SOLUTION INTRAVENOUS ONCE
Status: COMPLETED | OUTPATIENT
Start: 2018-02-15 | End: 2018-02-15

## 2018-02-15 RX ADMIN — Medication 1 PACKET: at 14:53

## 2018-02-15 RX ADMIN — Medication 15 ML: at 14:54

## 2018-02-15 RX ADMIN — POTASSIUM CHLORIDE 20 MEQ: 1.5 POWDER, FOR SOLUTION ORAL at 20:58

## 2018-02-15 RX ADMIN — VITAMIN D, TAB 1000IU (100/BT) 2000 UNITS: 25 TAB at 08:50

## 2018-02-15 RX ADMIN — RANITIDINE HYDROCHLORIDE 150 MG: 150 SOLUTION ORAL at 20:00

## 2018-02-15 RX ADMIN — CARBAMAZEPINE 150 MG: 100 SUSPENSION ORAL at 05:56

## 2018-02-15 RX ADMIN — POTASSIUM PHOSPHATE, MONOBASIC AND POTASSIUM PHOSPHATE, DIBASIC 15 MMOL: 224; 236 INJECTION, SOLUTION INTRAVENOUS at 03:33

## 2018-02-15 RX ADMIN — TAZOBACTAM SODIUM AND PIPERACILLIN SODIUM 4.5 G: 500; 4 INJECTION, SOLUTION INTRAVENOUS at 16:59

## 2018-02-15 RX ADMIN — TAZOBACTAM SODIUM AND PIPERACILLIN SODIUM 4.5 G: 500; 4 INJECTION, SOLUTION INTRAVENOUS at 02:00

## 2018-02-15 RX ADMIN — CARBAMAZEPINE 150 MG: 100 SUSPENSION ORAL at 13:06

## 2018-02-15 RX ADMIN — CARBAMAZEPINE 150 MG: 100 SUSPENSION ORAL at 23:14

## 2018-02-15 RX ADMIN — INSULIN ASPART 1 UNITS: 100 INJECTION, SOLUTION INTRAVENOUS; SUBCUTANEOUS at 19:58

## 2018-02-15 RX ADMIN — ASPIRIN 81 MG 81 MG: 81 TABLET ORAL at 08:52

## 2018-02-15 RX ADMIN — BRIVARACETAM 100 MG: 10 SOLUTION ORAL at 20:50

## 2018-02-15 RX ADMIN — FAMOTIDINE 20 MG: 10 INJECTION, SOLUTION INTRAVENOUS at 13:06

## 2018-02-15 RX ADMIN — BRIVARACETAM 100 MG: 10 SOLUTION ORAL at 08:53

## 2018-02-15 RX ADMIN — Medication 1 PACKET: at 21:03

## 2018-02-15 RX ADMIN — IPRATROPIUM BROMIDE AND ALBUTEROL SULFATE 3 ML: .5; 3 SOLUTION RESPIRATORY (INHALATION) at 06:55

## 2018-02-15 RX ADMIN — TAZOBACTAM SODIUM AND PIPERACILLIN SODIUM 4.5 G: 500; 4 INJECTION, SOLUTION INTRAVENOUS at 19:54

## 2018-02-15 RX ADMIN — FAMOTIDINE 20 MG: 10 INJECTION, SOLUTION INTRAVENOUS at 00:38

## 2018-02-15 RX ADMIN — ALBUMIN (HUMAN) 25 G: 12.5 SOLUTION INTRAVENOUS at 12:44

## 2018-02-15 RX ADMIN — CARBAMAZEPINE 150 MG: 100 SUSPENSION ORAL at 17:01

## 2018-02-15 RX ADMIN — LEVOTHYROXINE SODIUM 150 MCG: 150 TABLET ORAL at 06:00

## 2018-02-15 RX ADMIN — CARBAMAZEPINE 150 MG: 100 SUSPENSION ORAL at 00:12

## 2018-02-15 RX ADMIN — HYDROCORTISONE 20 MG: 10 TABLET ORAL at 08:50

## 2018-02-15 RX ADMIN — Medication 2 G: at 03:29

## 2018-02-15 RX ADMIN — HYDROCORTISONE 10 MG: 10 TABLET ORAL at 17:01

## 2018-02-15 RX ADMIN — TAZOBACTAM SODIUM AND PIPERACILLIN SODIUM 4.5 G: 500; 4 INJECTION, SOLUTION INTRAVENOUS at 08:51

## 2018-02-15 NOTE — PROGRESS NOTES
End of Shift Nursing Summary  02/14/18 1230 to 1930   Neuro:  Right side weak-less use of right arm. Alerts to voice and name. Able to shake head yes or no if having pain. Aphasia.  Pain: Denies.  CV:  Levophed to maintain MAP >65. Sinus tachycardia.  Pulm: On 4 L NC. Congested cough-unable to expectorate. Tachypneic.  GI/: Urine output adequate. No BM.  Endocrine: BG within goal range.  Skin: Reddened bottom, multiple scabs. [facility] nurse consulted.   Fever: Afebrile.   Lines/drips: PiCC line this afternoon. Left femoral line removed.     *See EPIC flowsheet for full nursing assessment findings    Mariana Grimm

## 2018-02-15 NOTE — PROGRESS NOTES
SPIRITUAL HEALTH SERVICES Progress Note  [facility] ICU    Brief visit with pt, whom I first met last June when he was in the hospital.  Pt was alone, and unable to have conversation, although he communicated with his eyes, hand gestures and vocal expression.  Pt agreed to offer of a visit from Fr. Rose (which he has appreciated in the past).  SH team will continue to be available for pt/family support, per need or request.                                                                                                                                                 Suki Kang M.A.  Staff   Pager [phone]  Phone [phone]

## 2018-02-15 NOTE — PLAN OF CARE
Problem: Patient Care Overview  Goal: Plan of Care/Patient Progress Review  Discharge Planner PT   Patient plan for discharge: Patient unable to communicate verbally and mother not present.   Current status: Patient transferred sup to sit with mod assist of 2. Needs mod assist to maintain sitting balance. Patient indicated no both times when therapist asked if he wanted to get up to chair. Returned to supine with mod assist of 2.    Barriers to return to prior living situation: Unable to get a hold of patients mom on the phone but from previous admissions aware patient lives in a handicap accessible home with his mother and has 24 hour hired assist so don't anticipate any barriers to returning home.   Recommendations for discharge: Anticipate patient will be able to return home with resuming prior level of assist but will need to confirm with patients mother amount of assist he needed and if this can continue to be provided.  Rationale for recommendations: Based on information available, anticipate patient is close to baseline.        Entered by: Marika Villalpando 02/15/2018 12:45 PM

## 2018-02-15 NOTE — PROVIDER NOTIFICATION
Discussed hemoglobin results (drop from 16.0 to 12.0) with Dr. Ash. No need to recheck later tonight, recheck in morning. Ionized calcium ok, no orders to replace.

## 2018-02-15 NOTE — PLAN OF CARE
Problem: Patient Care Overview  Goal: Plan of Care/Patient Progress Review  Discharge Planner OT   Patient plan for discharge: unknown  Current status: Orders received and chart reviewed. Pt.w/ h/o TBI w/resultant R sided weakenss, aphasia;admitted due to fever, SOB, sepsis. Per chart, pt. WC bound at baseline;per PT, pt. has no skilled OT needs. Spoke w/ nursing, in agreement. Will complete order/sign-off.  Barriers to return to prior living situation: defer to PT  Recommendations for discharge: defer to PT  Rationale for recommendations: defer to PT       Entered by: Annie Gonzalez 02/15/2018 10:55 AM

## 2018-02-15 NOTE — PROGRESS NOTES
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   A central line was placed and the patient was started on pressors      Ricci Assessment and sub scores:   Ricci Score  Av.5  Min: 12  Max: 13    Positioning: Mepilex dressing and Pillows    Mattress:  Standard , Atmos Air mattress    Moisture Management:  Urinary Catheter    Catheter secured? Yes    Current Diet / Nutrition:           Active Diet Order      NPO for Medical/Clinical Reasons Except for: Meds    Labs:   Recent Labs   Lab Test  02/15/18   0610  18   1300   17   0452   17   0228   16   0405   ALBUMIN   --   2.3*   < >   --    --    --    < >  2.0*   HGB  12.2*  12.0*   < >  9.4*   < >   --    < >  7.6*   INR   --    --    --   1.27*   --    --    < >   --    WBC  14.4*  18.7*   < >  11.7*   < >   --    < >  18.0*   A1C   --    --    --    --    --    --    --   5.1   CRP   --    --    --    --    --   87.0*   < >   --     < > = values in this interval not displayed.                                                                                                                          Pressure Injury Assessment  (location):   Coccyx/Buttock    Wound History:   Pt has traumatic brain injury with right spastic hemiplegia. He spends most of his time in a wheel chair    Wound Base: Epidermis intact, red blanchable erythema, dry    Specific Dimensions (length x width x depth, in cm) :   5cm x 6cm x 0cm    Tunneling:  N/A    Undermining:  "N/A    Palpation of the wound bed:  normal    Slough appearance:  none    Eschar appearance:  none    Periwound Skin: intact,      Color: normal and consistent with surrounding tissue    Temperature  normal     Drainage:  none         Odor: none    Pain:  absent         Intervention:     Patient's chart evaluated.      Ricci Interventions:  Current Ricci Interventions and Care Plan reviewed and updated, appropriate at this time.    Orders  Written    Supplies  at bedside    Discussed plan of care with Patient and Nurse           Assessment:     Pt does not currently have a pressure injury. Pt has blanchable redness over coccyx. Pt is able to assist with repositioning but does need assistance to offload. Will apply mepilex for protection and practice strict PIP.          Plan:     Nursing to notify the Provider(s) and re-consult the [facility] Nurse if wound(s) deteriorate(s)or if the wound care plan needs reevaluation.    If pt is refusing to turn or reposition they must be educated on the  potential injury from not off loading pressure.  Then this \"educated refusal\" needs to be documented as an \"educated refusal to turn/ reposition\" and document if alert, etc.  Plan for wound care to wound located on coccyx: change dressing every 2 to 3 days  1. Clean intact skin with gentle soap and water, dry and dry again.  2. Paint with No Sting Skin Prep (#[id number]) and allow to dry thoroughly  3. Press a Mepilex  Border Dressing (#[id number]) to the area, making sure to conform nicely to skin curvatures  4. Time and date dressing change and diamond with a \"P\" for prevention.  5. Reposition pt every 1 to 2 hours when in bed and hourly when up to the chair to relieve pressure and promote perfusion to tissue    NOTE** make sure to continue to assess under the Mepilex Dressing BID and document findings.  If epidermis  opens notify CWOCN's and change dressing to \"T\" for treatment:    [facility] Nurse will return: weekly and PRN    Sammy Rubin " CWOCN

## 2018-02-15 NOTE — CONSULTS
CLINICAL NUTRITION SERVICES  -  ASSESSMENT NOTE      Recommendations Ordered by Registered Dietitian (RD):   Isosource 1.5 @ 60 ml/hr (x 22 hr/day) to provide: 1980 kcal (26 kcal/kg), 90 g pro (1.2 g/kg), 20 g fiber, 1000 ml H2O, standard flushes @ 60 ml/hr q4h   NutriSource Fiber TID = 45 kcal, 9 fiber   Total = 2025 kcal (27 kcal/kg), 29 g fiber  Certavite ordered    Malnutrition:  Patient does not meet two of the above criteria necessary for diagnosing malnutrition.       REASON FOR ASSESSMENT  Keyon Farias is a 57 year old male seen by Registered Dietitian for Provider Order - Registered Dietitian to Assess and Order TF per Medical Nutrition protocol.      NUTRITION HISTORY  Unable to obtain current nutrition history d/t mother not available at the time of visit to provide.    Following information obtained from past RD note (12/27):  -Pt lives at home with his mother, he has 3-4 home care RNs that assist with care.  -Pt has been on TF since August 2016. He had a G-tube placed 9/7/16 but was later converted to a GJ tube on 12/2/16.  -The GJ tube was most recently changed on 11/25/17.     -Savannah (mother) states that at home they have been changing over from a noc feeding to mostly a daytime feeding.  -He gets Jevity 1.5, total of 5.5 cans/day. He starts at 10 AM and runs at 100 mL/hr, providing 1955 jamel, 83 gm protein.  -He also gets Nutrisource Fiber TID which provides add'l 45 jamel and 9 gm fiber daily.  -Savannah is unsure how much H2O flushes the nurses give.  -Savannah reports pt has a good bowel movement every day.     In the past pt was eating small amounts for pleasure but has recently been NPO and SLP has been working with him.     Home meds include the following: Ca, Vit D, Neutra Phos TID, Certavite and Synthroid.  TF is held I hr before and 1 hr after Synthroid administration because that is what was done when pt was at [facility].    Last admit in December TF is as follows:  Isosource 1.5 @ 60 ml/hr (x  "22 hr/day) to provide: 1980 kcal (26 kcal/kg), 90 g pro (1.2 g/kg), 20 g fiber, 1000 ml H2O, 160 ml q4h flush water   NutriSource Fiber TID = 45 kcal, 9 fiber   Total = 2025 kcal (27 kcal/kg), 29 g fiber    CURRENT NUTRITION ORDERS  Diet Order:     NPO       PHYSICAL FINDINGS  Observed  No nutrition-related physical findings observed  Obtained from Chart/Interdisciplinary Team  Blanchable redness over coccyx. -No pressure injury per WOCN eval.     ANTHROPOMETRICS  Height: 5' 11\"   Weight: 74.8 kg   Body mass index is 23.01 kg/(m^2).  Weight Status:  Normal BMI  IBW: 75.5 kg   % IBW: 99%   Weight History:  2% wt loss in 2 months.   3% wt loss in 4 months.    Wt Readings from Last 5 Encounters:   18 74.8 kg (165 lb)   17 76.2 kg (168 lb)   10/07/17 77.3 kg (170 lb 6.7 oz)   17 73.9 kg (163 lb)   06/15/17 70.3 kg (155 lb)       LABS  M.5 (H)   Phos: 2.6 (Low-end of NL)   K+: 3.9 (WNL)  BGMs: 172, 216, 223  (H)- High Resistant SSI        MEDICATIONS  Norepinephrine   Synthroid- hold TF for 1 hr before and after       Dosing Weight 74.8 kg ()     ASSESSED NUTRITION NEEDS PER APPROVED PRACTICE GUIDELINES:  Estimated Energy Needs: 2912-0642 kcals (25-30 Kcal/Kg)  Justification: Increased needs   Estimated Protein Needs:  grams protein (1.2-1.5 g pro/Kg)  Justification: preservation of lean body mass  Estimated Fluid Needs: 0309-7991  mL (1 mL/Kcal)  Justification: per provider pending fluid status    MALNUTRITION:  % Weight Loss:  Weight loss does not meet criteria for malnutrition- 2% wt loss in 2 months.   % Intake:  Suspect no decreased intake noted- pt on home TF regimen.  Subcutaneous Fat Loss:  None observed  Muscle Loss:  None observed  Fluid Retention:  None noted    Malnutrition Diagnosis: Patient does not meet two of the above criteria necessary for diagnosing malnutrition      NUTRITION DIAGNOSIS:  Inadequate enteral nutrition infusion related to TF on hold since admit as " evidenced by 0% of needs met.       NUTRITION INTERVENTIONS  Recommendations / Nutrition Prescription  Resume as follows as pt was receiving last admission:   Isosource 1.5 @ 60 ml/hr (x 22 hr/day) to provide: 1980 kcal (26 kcal/kg), 90 g pro (1.2 g/kg), 20 g fiber, 1000 ml H2O, 60 ml q4h flush water   NutriSource Fiber TID = 45 kcal, 9 fiber   Total = 2025 kcal (27 kcal/kg), 29 g fiber  Certavite daily as per home     Implementation  Nutrition education: educated pt on restarting TF regimen.   Collaboration and Referral of Nutrition care- pt discussed in ICU disciplinary rounds- TF consult ordered.   Enteral Nutrition - Initiate: Start 20 ml/hr q8hr, increase by 20 ml/hr to goal rate.   Medical food supplement therapy- order NutriSource Fiber TID  Certavite ordered     Nutrition Goals  Isosource 1.5 @ 60 ml/hr (x 22 hr/day) to meet % of estimated needs.   BG levels will be monitored to be less than 150 mg/dL.       MONITORING AND EVALUATION:  Progress towards goals will be monitored and evaluated per protocol and Practice Guidelines    Shea Lombardo   Dietetic Intern

## 2018-02-15 NOTE — PLAN OF CARE
Problem: Patient Care Overview  Goal: Plan of Care/Patient Progress Review  Pt remains R sided weakness. Follows some commands. MAP above 65 on Levophed 0.06. Other VSS on 4 LNC. LS coarse. Febrile at the beginning of shift, now resolved. Tele ST/SR. Increased urine output. Foam on coccyx applied. Will cont to monitor.

## 2018-02-15 NOTE — PROGRESS NOTES
Patient on 4L NC.  Lung sounds coarse w/ rhonchi.  Getting scheduled nebs.  Non productive cough.  Will continue to follow.

## 2018-02-15 NOTE — PROGRESS NOTES
02/15/18 1253   Quick Adds   Type of Visit Initial PT Evaluation   Living Environment   Lives With parent(s)  (mom)   Living Arrangements house   Living Environment Comment Per chart review and knowing pateint from prior admissions, patient lives in a handicap accessible home with his mom. Unsure how much hired assist patient gets. Will need to verify with pateints mom but couldn't get a hold of her on the phone.    Self-Care   Dominant Hand left   Usual Activity Tolerance fair   Current Activity Tolerance fair   Functional Level Prior   Ambulation 4-->completely dependent   Transferring 4-->completely dependent   Toileting 4-->completely dependent   Bathing 4-->completely dependent   Dressing 4-->completely dependent   Eating 4-->completely dependent   Communication 2-->difficulty speaking (not related to language barrier)   Prior Functional Level Comment Unsure if pateint still performing stand pivot or using a lift at home. Will need to verify with patients mom.    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    Precautions/Limitations fall precautions   Weight-Bearing Status - LUE full weight-bearing   Weight-Bearing Status - RUE full weight-bearing   Weight-Bearing Status - LLE full weight-bearing   Weight-Bearing Status - RLE full weight-bearing   General Observations Pateint able to make his needs know  thorugh pointing, thumbs up and grunting.    Cognitive Status Examination   Orientation (Difficult to assess since patient non-verbal )   Level of Consciousness alert   Follows Commands and Answers Questions (Needs additional manual cues but can follow some single step)   Personal Safety and Judgment intact   Pain Assessment   Patient Currently in Pain Yes, see Vital Sign flowsheet  (Indicating pain in left arm. )   Integumentary/Edema   Integumentary/Edema Comments Noted scratches up right forearm.    Posture    Posture Comments Keeps right arm across his abdomen.    Range of Motion (ROM)   ROM Comment Right UE limited by tone. Patient indicated for therapist not to move it due to pain. Bilateral DF limited and PRAFO's on.    Strength   Strength Comments Strangth in left UE and LE grossly 4/5. Lifting both off the bed easliy. Rigth UE extremely limited by tone and did not note any movement in right UE. Able to lift right LE off the bed but with diffilculty.    Bed Mobility   Bed Mobility Comments Sup to sit with mod assist of 2.    Transfer Skills   Transfer Comments Patient non verbally indicating he did not want to get up to the chair.    Balance   Balance Comments Mod assist needed for sitting balance.    Muscle Tone   Muscle Tone Comments Increased tone right UE limiting range.    General Therapy Interventions   Planned Therapy Interventions balance training;bed mobility training;transfer training   Clinical Impression   Criteria for Skilled Therapeutic Intervention yes, treatment indicated   PT Diagnosis Impaired functional tolerance and independence   Influenced by the following impairments Impaired bilaterl DF range, unable to use right UE due to weakness and tone.    Functional limitations due to impairments Needs assist with bed mobility, transfers.    Clinical Presentation Stable/Uncomplicated   Clinical Decision Making (Complexity) Low complexity   Therapy Frequency` 5 times/week   Predicted Duration of  "Therapy Intervention (days/wks) 5 days   Anticipated Equipment Needs at Discharge (Anticipate equipment needs already met. )   Anticipated Discharge Disposition Other (see comments)  (Home with resuming prior assist at home. )   Risk & Benefits of therapy have been explained Yes   Patient, Family & other staff in agreement with plan of care Yes   [facility] TM \"6 Clicks\"   2016, Trustees of [facility], under license to [employer].  All rights reserved.   6 Clicks Short Forms Basic Mobility Inpatient Short Form   [facility]  \"6 Clicks\" V.2 Basic Mobility Inpatient Short Form   1. Turning from your back to your side while in a flat bed without using bedrails? 1 - Total   2. Moving from lying on your back to sitting on the side of a flat bed without using bedrails? 1 - Total   3. Moving to and from a bed to a chair (including a wheelchair)? 1 - Total   4. Standing up from a chair using your arms (e.g., wheelchair, or bedside chair)? 1 - Total   5. To walk in hospital room? 1 - Total   6. Climbing 3-5 steps with a railing? 1 - Total   Basic Mobility Raw Score (Score out of 24.Lower scores equate to lower levels of function) 6   Total Evaluation Time   Total Evaluation Time (Minutes) 20     "

## 2018-02-16 LAB
ANION GAP SERPL CALCULATED.3IONS-SCNC: 4 MMOL/L (ref 3–14)
BASE EXCESS BLDV CALC-SCNC: 4.2 MMOL/L
BASOPHILS # BLD AUTO: 0.1 10E9/L (ref 0–0.2)
BASOPHILS NFR BLD AUTO: 0.8 %
BUN SERPL-MCNC: 9 MG/DL (ref 7–30)
CA-I BLD-MCNC: 5.1 MG/DL (ref 4.4–5.2)
CALCIUM SERPL-MCNC: 8.5 MG/DL (ref 8.5–10.1)
CHLORIDE SERPL-SCNC: 113 MMOL/L (ref 94–109)
CO2 SERPL-SCNC: 28 MMOL/L (ref 20–32)
CREAT SERPL-MCNC: 0.99 MG/DL (ref 0.66–1.25)
DIFFERENTIAL METHOD BLD: ABNORMAL
EOSINOPHIL # BLD AUTO: 0.8 10E9/L (ref 0–0.7)
EOSINOPHIL NFR BLD AUTO: 10.7 %
ERYTHROCYTE [DISTWIDTH] IN BLOOD BY AUTOMATED COUNT: 13.6 % (ref 10–15)
GFR SERPL CREATININE-BSD FRML MDRD: 78 ML/MIN/1.7M2
GLUCOSE BLDC GLUCOMTR-MCNC: 146 MG/DL (ref 70–99)
GLUCOSE BLDC GLUCOMTR-MCNC: 152 MG/DL (ref 70–99)
GLUCOSE BLDC GLUCOMTR-MCNC: 160 MG/DL (ref 70–99)
GLUCOSE BLDC GLUCOMTR-MCNC: 170 MG/DL (ref 70–99)
GLUCOSE BLDC GLUCOMTR-MCNC: 81 MG/DL (ref 70–99)
GLUCOSE SERPL-MCNC: 110 MG/DL (ref 70–99)
GLUCOSE SERPL-MCNC: 133 MG/DL (ref 70–99)
HCO3 BLDV-SCNC: 30 MMOL/L (ref 21–28)
HCT VFR BLD AUTO: 34.2 % (ref 40–53)
HGB BLD-MCNC: 11.4 G/DL (ref 13.3–17.7)
IMM GRANULOCYTES # BLD: 0 10E9/L (ref 0–0.4)
IMM GRANULOCYTES NFR BLD: 0.1 %
LACTATE BLD-SCNC: 1.2 MMOL/L (ref 0.7–2)
LYMPHOCYTES # BLD AUTO: 1.8 10E9/L (ref 0.8–5.3)
LYMPHOCYTES NFR BLD AUTO: 23.1 %
MCH RBC QN AUTO: 30.9 PG (ref 26.5–33)
MCHC RBC AUTO-ENTMCNC: 33.3 G/DL (ref 31.5–36.5)
MCV RBC AUTO: 93 FL (ref 78–100)
MONOCYTES # BLD AUTO: 0.5 10E9/L (ref 0–1.3)
MONOCYTES NFR BLD AUTO: 7.1 %
NEUTROPHILS # BLD AUTO: 4.4 10E9/L (ref 1.6–8.3)
NEUTROPHILS NFR BLD AUTO: 58.2 %
NRBC # BLD AUTO: 0 10*3/UL
NRBC BLD AUTO-RTO: 0 /100
OXYHGB MFR BLDV: 68 %
PCO2 BLDV: 47 MM HG (ref 40–50)
PH BLDV: 7.41 PH (ref 7.32–7.43)
PHOSPHATE SERPL-MCNC: 2.7 MG/DL (ref 2.5–4.5)
PLATELET # BLD AUTO: 96 10E9/L (ref 150–450)
PO2 BLDV: 36 MM HG (ref 25–47)
POTASSIUM SERPL-SCNC: 3.6 MMOL/L (ref 3.4–5.3)
POTASSIUM SERPL-SCNC: 3.7 MMOL/L (ref 3.4–5.3)
PROCALCITONIN SERPL-MCNC: 1.85 NG/ML
RBC # BLD AUTO: 3.69 10E12/L (ref 4.4–5.9)
SODIUM SERPL-SCNC: 143 MMOL/L (ref 133–144)
SODIUM SERPL-SCNC: 145 MMOL/L (ref 133–144)
WBC # BLD AUTO: 7.6 10E9/L (ref 4–11)

## 2018-02-16 PROCEDURE — 25000128 H RX IP 250 OP 636: Performed by: INTERNAL MEDICINE

## 2018-02-16 PROCEDURE — 25000128 H RX IP 250 OP 636: Performed by: ANESTHESIOLOGY

## 2018-02-16 PROCEDURE — A9270 NON-COVERED ITEM OR SERVICE: HCPCS | Mod: GY | Performed by: NURSE PRACTITIONER

## 2018-02-16 PROCEDURE — 84132 ASSAY OF SERUM POTASSIUM: CPT | Performed by: ANESTHESIOLOGY

## 2018-02-16 PROCEDURE — 84145 PROCALCITONIN (PCT): CPT | Performed by: ANESTHESIOLOGY

## 2018-02-16 PROCEDURE — 99223 1ST HOSP IP/OBS HIGH 75: CPT | Performed by: INTERNAL MEDICINE

## 2018-02-16 PROCEDURE — 82330 ASSAY OF CALCIUM: CPT | Performed by: ANESTHESIOLOGY

## 2018-02-16 PROCEDURE — 82947 ASSAY GLUCOSE BLOOD QUANT: CPT | Performed by: ANESTHESIOLOGY

## 2018-02-16 PROCEDURE — 25000132 ZZH RX MED GY IP 250 OP 250 PS 637: Mod: GY | Performed by: ANESTHESIOLOGY

## 2018-02-16 PROCEDURE — 25000128 H RX IP 250 OP 636: Performed by: PHYSICIAN ASSISTANT

## 2018-02-16 PROCEDURE — 84295 ASSAY OF SERUM SODIUM: CPT | Performed by: ANESTHESIOLOGY

## 2018-02-16 PROCEDURE — 85025 COMPLETE CBC W/AUTO DIFF WBC: CPT | Performed by: ANESTHESIOLOGY

## 2018-02-16 PROCEDURE — 40000275 ZZH STATISTIC RCP TIME EA 10 MIN

## 2018-02-16 PROCEDURE — 82805 BLOOD GASES W/O2 SATURATION: CPT | Performed by: ANESTHESIOLOGY

## 2018-02-16 PROCEDURE — 84100 ASSAY OF PHOSPHORUS: CPT | Performed by: ANESTHESIOLOGY

## 2018-02-16 PROCEDURE — 94640 AIRWAY INHALATION TREATMENT: CPT | Mod: 76

## 2018-02-16 PROCEDURE — 80048 BASIC METABOLIC PNL TOTAL CA: CPT | Performed by: ANESTHESIOLOGY

## 2018-02-16 PROCEDURE — 83605 ASSAY OF LACTIC ACID: CPT | Performed by: ANESTHESIOLOGY

## 2018-02-16 PROCEDURE — 25000125 ZZHC RX 250: Performed by: NURSE PRACTITIONER

## 2018-02-16 PROCEDURE — 40000239 ZZH STATISTIC VAT ROUNDS

## 2018-02-16 PROCEDURE — 25000132 ZZH RX MED GY IP 250 OP 250 PS 637: Mod: GY | Performed by: NURSE PRACTITIONER

## 2018-02-16 PROCEDURE — A9270 NON-COVERED ITEM OR SERVICE: HCPCS | Mod: GY | Performed by: ANESTHESIOLOGY

## 2018-02-16 PROCEDURE — 27210429 ZZH NUTRITION PRODUCT INTERMEDIATE LITER

## 2018-02-16 PROCEDURE — 21000000 ZZH R&B IMCU HEART CARE

## 2018-02-16 PROCEDURE — 00000146 ZZHCL STATISTIC GLUCOSE BY METER IP

## 2018-02-16 PROCEDURE — 94640 AIRWAY INHALATION TREATMENT: CPT

## 2018-02-16 RX ORDER — NITROGLYCERIN 0.4 MG/1
0.4 TABLET SUBLINGUAL EVERY 5 MIN PRN
Status: DISCONTINUED | OUTPATIENT
Start: 2018-02-16 | End: 2018-02-21 | Stop reason: HOSPADM

## 2018-02-16 RX ORDER — AMOXICILLIN AND CLAVULANATE POTASSIUM 562.5; 437.5; 62.5 MG/1; MG/1; MG/1
2 TABLET, MULTILAYER, EXTENDED RELEASE ORAL EVERY 12 HOURS SCHEDULED
Status: DISCONTINUED | OUTPATIENT
Start: 2018-02-16 | End: 2018-02-16 | Stop reason: RX

## 2018-02-16 RX ORDER — AMOXICILLIN AND CLAVULANATE POTASSIUM 250; 62.5 MG/5ML; MG/5ML
500 POWDER, FOR SUSPENSION ORAL EVERY 8 HOURS
Status: DISCONTINUED | OUTPATIENT
Start: 2018-02-16 | End: 2018-02-16

## 2018-02-16 RX ORDER — CARBAMAZEPINE 100 MG/5ML
150 SUSPENSION ORAL EVERY 6 HOURS
Status: DISCONTINUED | OUTPATIENT
Start: 2018-02-16 | End: 2018-02-21 | Stop reason: HOSPADM

## 2018-02-16 RX ORDER — ONDANSETRON 2 MG/ML
4 INJECTION INTRAMUSCULAR; INTRAVENOUS EVERY 6 HOURS PRN
Status: DISCONTINUED | OUTPATIENT
Start: 2018-02-16 | End: 2018-02-21 | Stop reason: HOSPADM

## 2018-02-16 RX ADMIN — POTASSIUM CHLORIDE 20 MEQ: 1.5 POWDER, FOR SOLUTION ORAL at 08:01

## 2018-02-16 RX ADMIN — TAZOBACTAM SODIUM AND PIPERACILLIN SODIUM 4.5 G: 500; 4 INJECTION, SOLUTION INTRAVENOUS at 07:52

## 2018-02-16 RX ADMIN — HYDROCORTISONE 20 MG: 10 TABLET ORAL at 08:00

## 2018-02-16 RX ADMIN — HYDROCORTISONE 10 MG: 10 TABLET ORAL at 17:00

## 2018-02-16 RX ADMIN — INSULIN ASPART 1 UNITS: 100 INJECTION, SOLUTION INTRAVENOUS; SUBCUTANEOUS at 20:28

## 2018-02-16 RX ADMIN — ONDANSETRON 4 MG: 2 INJECTION INTRAMUSCULAR; INTRAVENOUS at 07:33

## 2018-02-16 RX ADMIN — CARBAMAZEPINE 150 MG: 100 SUSPENSION ORAL at 05:25

## 2018-02-16 RX ADMIN — SODIUM CHLORIDE: 9 INJECTION, SOLUTION INTRAVENOUS at 12:04

## 2018-02-16 RX ADMIN — BRIVARACETAM 100 MG: 10 SOLUTION ORAL at 22:02

## 2018-02-16 RX ADMIN — AMOXICILLIN AND CLAVULANATE POTASSIUM 500 MG: 250; 62.5 POWDER, FOR SUSPENSION ORAL at 16:59

## 2018-02-16 RX ADMIN — INSULIN ASPART 1 UNITS: 100 INJECTION, SOLUTION INTRAVENOUS; SUBCUTANEOUS at 11:59

## 2018-02-16 RX ADMIN — CHOLECALCIFEROL TAB 25 MCG (1000 UNIT) 2000 UNITS: 25 TAB at 08:01

## 2018-02-16 RX ADMIN — Medication 1 PACKET: at 22:09

## 2018-02-16 RX ADMIN — TAZOBACTAM SODIUM AND PIPERACILLIN SODIUM 4.5 G: 500; 4 INJECTION, SOLUTION INTRAVENOUS at 02:43

## 2018-02-16 RX ADMIN — Medication 1 PACKET: at 08:00

## 2018-02-16 RX ADMIN — INSULIN ASPART 1 UNITS: 100 INJECTION, SOLUTION INTRAVENOUS; SUBCUTANEOUS at 04:03

## 2018-02-16 RX ADMIN — LEVOTHYROXINE SODIUM 150 MCG: 150 TABLET ORAL at 05:25

## 2018-02-16 RX ADMIN — IPRATROPIUM BROMIDE AND ALBUTEROL SULFATE 3 ML: .5; 3 SOLUTION RESPIRATORY (INHALATION) at 15:34

## 2018-02-16 RX ADMIN — POTASSIUM CHLORIDE 20 MEQ: 1.5 POWDER, FOR SOLUTION ORAL at 16:59

## 2018-02-16 RX ADMIN — Medication 1 PACKET: at 15:01

## 2018-02-16 RX ADMIN — TAZOBACTAM SODIUM AND PIPERACILLIN SODIUM 3.38 G: 375; 3 INJECTION, SOLUTION INTRAVENOUS at 22:02

## 2018-02-16 RX ADMIN — RANITIDINE HYDROCHLORIDE 150 MG: 150 SOLUTION ORAL at 22:03

## 2018-02-16 RX ADMIN — BRIVARACETAM 100 MG: 10 SOLUTION ORAL at 09:20

## 2018-02-16 RX ADMIN — CARBAMAZEPINE 150 MG: 100 SUSPENSION ORAL at 17:00

## 2018-02-16 RX ADMIN — Medication 15 ML: at 08:01

## 2018-02-16 RX ADMIN — ASPIRIN 81 MG 81 MG: 81 TABLET ORAL at 08:01

## 2018-02-16 RX ADMIN — IPRATROPIUM BROMIDE AND ALBUTEROL SULFATE 3 ML: .5; 3 SOLUTION RESPIRATORY (INHALATION) at 12:07

## 2018-02-16 RX ADMIN — IPRATROPIUM BROMIDE AND ALBUTEROL SULFATE 3 ML: .5; 3 SOLUTION RESPIRATORY (INHALATION) at 20:01

## 2018-02-16 RX ADMIN — RANITIDINE HYDROCHLORIDE 150 MG: 150 SOLUTION ORAL at 08:01

## 2018-02-16 RX ADMIN — INSULIN ASPART 1 UNITS: 100 INJECTION, SOLUTION INTRAVENOUS; SUBCUTANEOUS at 15:04

## 2018-02-16 RX ADMIN — CARBAMAZEPINE 150 MG: 100 SUSPENSION ORAL at 11:59

## 2018-02-16 NOTE — PLAN OF CARE
Problem: Patient Care Overview  Goal: Plan of Care/Patient Progress Review  Outcome: Improving  End of Shift Nursing Summary  02/15/18 0700 to 1930   Neuro:  Unchanged.   Pain: Denies  CV:  SR on tele. Levophed titrated off. Maintaining MAP >60 without vasopressor support  Pulm: coarse right mid and lower lung. On 3 L NC.  GI/: Urine output adequate. Small BM today.   Endocrine: Elevated BG this a.m.  Skin: Rash in pelvic region and blanchable erythema on bilateral buttocks. Multiple small scabs on right arm and left leg.  Fever: AFebrile.  Lines/drips: Left PICC. Lerma catheter.     *See EPIC flowsheet for full nursing assessment findings    Mariana Grimm

## 2018-02-16 NOTE — PROGRESS NOTES
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   A central line was placed and the patient was started on pressors     2/16/2018: No issues overnight.      Assessment and plan :      Keyon Farias IS a 57 year old male admitted on 2/14/2018 for sepsis with possible source as lungs   I have personally reviewed the daily labs, imaging studies, cultures and discussed the case with referring physician and consulting physicians.      My assessment and plan by system for this patient is as follows:     Neurology/Psychiatry:   1. Awake and alert and follows commands    Traumatic brain injury, aphasic, seizure disorder, panhypopituitarism.    Plan  Currently does not need sedation medications and is cooperative     Cardiovascular:   1.Hemodynamics -started on nor epinephrine  2.Rhythm - sinus tachycardia  3. Ischemia - unlikely  Plan  Fluid resuscitation and pressors as needed  Left ventricular function on echo done was 60-65% in December 2017     Pulmonary/Ventilator Management:   1. Airway native  2. Oxygenation/ventilation/mechanics: consider BIPAP 12/7 as necessary  Will need duo nebs QID but shows no distress, but some challenges with secretions  Plan  - Currently not in so much of distress that he needs intubation.        GI and Nutrition :   1. Tube feeds currently at goal  CT scan on 1/6/18 (not this admission)  1. Circumferential bowel wall thickening in the  rectum is new since  the previous exam. This finding could represent an infectious or  inflammatory proctitis, and clinical correlation is recommended.  Rectal neoplasm cannot be excluded from this appearance.  2. Hazy interstitial and groundglass opacities at both lung bases  posteriorly are nonspecific, but could be infectious in etiology.         Renal/Fluids/Electrolytes:   1. Continue to monitor fluid status  2. Electrolyte abnormality  3. Acid/base status, normal  4. Volume status: euosmic  Plan  -- monitor function and electrolytes as needed with replacement per ICU protocols. - generally avoid nephrotoxic agents such as NSAID, IV contrast unless specifically required  - adjust medications as needed for renal clearance  - follow I/O's as appropriate.     Infectious Disease:   1. We will stop Zosyn, no growth noted. Discussed with hospital ist in transition of care. Will consider Augmentin and continue for 4 days (which will be a total of 7 days of antibiotics).   2. Order pro calcitonin     Endocrine:   1.  Stress induced hyperglycemia     Plan  - ICU insulin protocol, goal sugar <180     Hematology/Oncology:   1. Anemia, no signs, symptoms of active blood loss     Plan  -Continue monitoring daily      IV/Access:   1. Venous access - PICC Line   2. Arterial access - None  3.  Plan  - central access required and necessary        ICU Prophylaxis:   1. DVT: Hep Subq/ LMWH/mechanical  2. VAP: HOB 30 degrees, chlorhexidine rinse  3. Stress Ulcer: PPI/H2 blocker  4. Restraints: Nonviolent soft two point restraints required and necessary for patient safety and continued cares and good effect as patient continues to pull at necessary lines, tubes despite education and distraction. Will readdress daily.   5. Wound care  -   6. Feeding -On tube feeds at goal  7. Family Update:  8. Disposition -ICU           Key goals for next 24 hours:   1. Stopped zosyn   2.Transfer care to Hospital ist  3. Continue home medications  for panhypopituitarism               Key Medications:       carBAMazepine  150 mg Per J Tube Q6H     insulin aspart  1-6 Units Subcutaneous Q4H     multivitamins with minerals  15 mL Per Feeding Tube Daily     hydrocortisone  10 mg Per J Tube QPM     cholecalciferol  2,000 Units Per J Tube Daily     Brivaracetam  100 mg Per J Tube BID     rantidine  150 mg Oral BID     sodium chloride (PF)  10 mL Intracatheter Q8H     sodium chloride (PF)  10 mL Intracatheter Q8H     ipratropium - albuterol 0.5 mg/2.5 mg/3 mL  3 mL Nebulization 4x Daily     aspirin  81 mg Per J Tube Daily     fiber modular  1 packet Per Feeding Tube TID     hydrocortisone  20 mg Per J Tube QAM     levothyroxine  150 mcg Per J Tube Daily       IV fluid REPLACEMENT ONLY       sodium chloride 10 mL/hr at 02/16/18 1204     norepinephrine Stopped (02/15/18 1230)     vasopressin (PITRESSIN) infusion ADULT (40 mL) Stopped (02/14/18 1403)               Physical Examination:   Temp:  [97.8  F (36.6  C)-100.8  F (38.2  C)] 100.2  F (37.9  C)  Heart Rate:  [] 90  Resp:  [8-29] 8  BP: ()/() 104/57  SpO2:  [90 %-100 %] 92 %    Intake/Output Summary (Last 24 hours) at 02/16/18 1320  Last data filed at 02/16/18 1200   Gross per 24 hour   Intake             1800 ml   Output             2735 ml   Net             -935 ml     Wt Readings from Last 4 Encounters:   02/16/18 73 kg (160 lb 15 oz)   12/31/17 76.2 kg (168 lb)   10/07/17 77.3 kg (170 lb 6.7 oz)   07/27/17 73.9 kg (163 lb)     BP - Mean:  [] 72  Resp: 8    Recent Labs  Lab 02/15/18  0020 02/14/18  1245   PH 7.39 7.40   PCO2 44 38   PO2 61* 87   HCO3 26 24   O2PER  --  4L       GEN: no acute distress   HEENT: head ncat, sclera anicteric, OP patent, trachea midline   PULM: unlabored synchronous breathing, secretions present    CV/COR: RRR S1S2 no gallop,  No rub, no murmur  ABD: soft nontender, hypoactive bowel sounds, no mass, G tube in place    EXT:  Edema   warm  NEURO: grossly  intact  SKIN: no obvious rash  LINES: clean, dry intact         Data:   All data and imaging reviewed     ROUTINE ICU LABS (Last four results)  CMP  Recent Labs  Lab 02/16/18  0515 02/15/18  2315 02/15/18  1835 02/15/18  1300 02/15/18  0610 02/15/18  0020  02/14/18  1300 02/14/18  0350   * 146* 146* 147* 146* 145*  < > 142 138   POTASSIUM 3.6 3.9 3.5 3.6 3.9 4.5  < > 4.1 3.8   CHLORIDE 113*  --   --   --  115*  --   --  110* 100   CO2 28  --   --   --  26  --   --  25 31   ANIONGAP 4  --   --   --  5  --   --  7 7   * 123* 135* 142* 223*  --   --  95 116*   BUN 9  --   --   --  12  --   --  16 20   CR 0.99  --   --   --  1.15  --   --  1.16 1.09   GFRESTIMATED 78  --   --   --  66  --   --  65 70   GFRESTBLACK >90  --   --   --  80  --   --  79 85   STEVE 8.5  --   --   --  8.2*  --   --  7.2* 8.8   MAG  --   --   --   --  2.5* 1.8  --   --   --    PHOS  --   --   --   --  2.6 2.3*  --   --   --    PROTTOTAL  --   --   --   --   --   --   --  5.8* 8.0   ALBUMIN  --   --   --   --   --   --   --  2.3* 3.3*   BILITOTAL  --   --   --   --   --   --   --  0.6 0.5   ALKPHOS  --   --   --   --   --   --   --  66 98   AST  --   --   --   --   --   --   --  18 33   ALT  --   --   --   --   --   --   --  25 36   < > = values in this interval not displayed.  CBC  Recent Labs  Lab 02/16/18  0515 02/15/18  0610 02/14/18  1300 02/14/18  0350   WBC 7.6 14.4* 18.7* 14.8*   RBC 3.69* 3.97* 3.89* 5.09   HGB 11.4* 12.2* 12.0* 16.0   HCT 34.2* 37.0* 35.9* 46.3   MCV 93 93 92 91   MCH 30.9 30.7 30.8 31.4   MCHC 33.3 33.0 33.4 34.6   RDW 13.6 13.5 13.6 13.1   PLT 96* 117* 111* 140*     INRNo lab results found in last 7 days.  Arterial Blood Gas  Recent Labs  Lab 02/15/18  0020 02/14/18  1245   PH 7.39 7.40   PCO2 44 38   PO2 61* 87   HCO3 26 24   O2PER  --  4L       All cultures:    Recent Labs  Lab 02/15/18  0020 02/14/18  0854 02/14/18  0450 02/14/18  0350   CULT Culture in progress No growth No growth after 2 days No  growth after 2 days     No results found for this or any previous visit (from the past 24 hour(s)).      Billing: This patient is critically ill: No. Total critical care time today 32 min.

## 2018-02-16 NOTE — PLAN OF CARE
Problem: Patient Care Overview  Goal: Plan of Care/Patient Progress Review  Outcome: No Change  Spastic right sided hemiplegia at baseline. Follows some commands. MAP maintained  >60 without gtt. Other VSS on 2 LNC. LS coarse. Tele SR. Episode of vomit this AM. [place] urine output. BM x1. Blanchable erythema on bilateral buttocks, foam on and q 2 hr repositioning provided. Rash in pelvic region and other multiple scabs on arms and legs. Frequent oral cares provided. Will continue to monitor.

## 2018-02-17 ENCOUNTER — APPOINTMENT (OUTPATIENT)
Dept: PHYSICAL THERAPY | Facility: CLINIC | Age: 58
DRG: 871 | End: 2018-02-17
Payer: MEDICARE

## 2018-02-17 LAB
ANION GAP SERPL CALCULATED.3IONS-SCNC: 5 MMOL/L (ref 3–14)
BACTERIA SPEC CULT: ABNORMAL
BASOPHILS # BLD AUTO: 0.1 10E9/L (ref 0–0.2)
BASOPHILS NFR BLD AUTO: 0.8 %
BUN SERPL-MCNC: 12 MG/DL (ref 7–30)
CALCIUM SERPL-MCNC: 8.3 MG/DL (ref 8.5–10.1)
CHLORIDE SERPL-SCNC: 107 MMOL/L (ref 94–109)
CO2 SERPL-SCNC: 30 MMOL/L (ref 20–32)
CREAT SERPL-MCNC: 0.96 MG/DL (ref 0.66–1.25)
DIFFERENTIAL METHOD BLD: ABNORMAL
EOSINOPHIL # BLD AUTO: 0.9 10E9/L (ref 0–0.7)
EOSINOPHIL NFR BLD AUTO: 13.1 %
ERYTHROCYTE [DISTWIDTH] IN BLOOD BY AUTOMATED COUNT: 13.4 % (ref 10–15)
GFR SERPL CREATININE-BSD FRML MDRD: 82 ML/MIN/1.7M2
GLUCOSE BLDC GLUCOMTR-MCNC: 120 MG/DL (ref 70–99)
GLUCOSE BLDC GLUCOMTR-MCNC: 124 MG/DL (ref 70–99)
GLUCOSE BLDC GLUCOMTR-MCNC: 149 MG/DL (ref 70–99)
GLUCOSE BLDC GLUCOMTR-MCNC: 157 MG/DL (ref 70–99)
GLUCOSE BLDC GLUCOMTR-MCNC: 169 MG/DL (ref 70–99)
GLUCOSE BLDC GLUCOMTR-MCNC: 179 MG/DL (ref 70–99)
GLUCOSE SERPL-MCNC: 176 MG/DL (ref 70–99)
HCT VFR BLD AUTO: 35.4 % (ref 40–53)
HGB BLD-MCNC: 11.7 G/DL (ref 13.3–17.7)
IMM GRANULOCYTES # BLD: 0 10E9/L (ref 0–0.4)
IMM GRANULOCYTES NFR BLD: 0.2 %
LYMPHOCYTES # BLD AUTO: 2.2 10E9/L (ref 0.8–5.3)
LYMPHOCYTES NFR BLD AUTO: 32.9 %
MCH RBC QN AUTO: 30.5 PG (ref 26.5–33)
MCHC RBC AUTO-ENTMCNC: 33.1 G/DL (ref 31.5–36.5)
MCV RBC AUTO: 92 FL (ref 78–100)
MONOCYTES # BLD AUTO: 0.6 10E9/L (ref 0–1.3)
MONOCYTES NFR BLD AUTO: 9.4 %
NEUTROPHILS # BLD AUTO: 2.9 10E9/L (ref 1.6–8.3)
NEUTROPHILS NFR BLD AUTO: 43.6 %
NRBC # BLD AUTO: 0 10*3/UL
NRBC BLD AUTO-RTO: 0 /100
PHOSPHATE SERPL-MCNC: 3.3 MG/DL (ref 2.5–4.5)
PLATELET # BLD AUTO: 131 10E9/L (ref 150–450)
POTASSIUM SERPL-SCNC: 3.7 MMOL/L (ref 3.4–5.3)
RBC # BLD AUTO: 3.84 10E12/L (ref 4.4–5.9)
SODIUM SERPL-SCNC: 142 MMOL/L (ref 133–144)
SPECIMEN SOURCE: ABNORMAL
WBC # BLD AUTO: 6.6 10E9/L (ref 4–11)

## 2018-02-17 PROCEDURE — 25000125 ZZHC RX 250: Performed by: NURSE PRACTITIONER

## 2018-02-17 PROCEDURE — 21000000 ZZH R&B IMCU HEART CARE

## 2018-02-17 PROCEDURE — 25000132 ZZH RX MED GY IP 250 OP 250 PS 637: Mod: GY | Performed by: ANESTHESIOLOGY

## 2018-02-17 PROCEDURE — 97110 THERAPEUTIC EXERCISES: CPT | Mod: GP | Performed by: PHYSICAL THERAPIST

## 2018-02-17 PROCEDURE — A9270 NON-COVERED ITEM OR SERVICE: HCPCS | Mod: GY | Performed by: ANESTHESIOLOGY

## 2018-02-17 PROCEDURE — 40000193 ZZH STATISTIC PT WARD VISIT: Performed by: PHYSICAL THERAPIST

## 2018-02-17 PROCEDURE — 99232 SBSQ HOSP IP/OBS MODERATE 35: CPT | Performed by: HOSPITALIST

## 2018-02-17 PROCEDURE — 40000895 ZZH STATISTIC SLP IP EVAL DEFER: Performed by: SPEECH-LANGUAGE PATHOLOGIST

## 2018-02-17 PROCEDURE — 40000275 ZZH STATISTIC RCP TIME EA 10 MIN

## 2018-02-17 PROCEDURE — 25000128 H RX IP 250 OP 636: Performed by: PHYSICIAN ASSISTANT

## 2018-02-17 PROCEDURE — 25000132 ZZH RX MED GY IP 250 OP 250 PS 637: Mod: GY | Performed by: NURSE PRACTITIONER

## 2018-02-17 PROCEDURE — A9270 NON-COVERED ITEM OR SERVICE: HCPCS | Mod: GY | Performed by: NURSE PRACTITIONER

## 2018-02-17 PROCEDURE — 80048 BASIC METABOLIC PNL TOTAL CA: CPT | Performed by: PHYSICIAN ASSISTANT

## 2018-02-17 PROCEDURE — 27210429 ZZH NUTRITION PRODUCT INTERMEDIATE LITER

## 2018-02-17 PROCEDURE — 94640 AIRWAY INHALATION TREATMENT: CPT | Mod: 76

## 2018-02-17 PROCEDURE — 94640 AIRWAY INHALATION TREATMENT: CPT

## 2018-02-17 PROCEDURE — 84100 ASSAY OF PHOSPHORUS: CPT | Performed by: PHYSICIAN ASSISTANT

## 2018-02-17 PROCEDURE — 40000239 ZZH STATISTIC VAT ROUNDS

## 2018-02-17 PROCEDURE — 00000146 ZZHCL STATISTIC GLUCOSE BY METER IP

## 2018-02-17 PROCEDURE — 85025 COMPLETE CBC W/AUTO DIFF WBC: CPT | Performed by: PHYSICIAN ASSISTANT

## 2018-02-17 PROCEDURE — 25000128 H RX IP 250 OP 636: Performed by: ANESTHESIOLOGY

## 2018-02-17 PROCEDURE — 40000884 ZZH STATISTIC STEP DOWN HRS NIGHT

## 2018-02-17 RX ADMIN — BRIVARACETAM 100 MG: 10 SOLUTION ORAL at 22:03

## 2018-02-17 RX ADMIN — CARBAMAZEPINE 150 MG: 100 SUSPENSION ORAL at 12:26

## 2018-02-17 RX ADMIN — RANITIDINE HYDROCHLORIDE 150 MG: 150 SOLUTION ORAL at 21:43

## 2018-02-17 RX ADMIN — TAZOBACTAM SODIUM AND PIPERACILLIN SODIUM 3.38 G: 375; 3 INJECTION, SOLUTION INTRAVENOUS at 10:50

## 2018-02-17 RX ADMIN — CARBAMAZEPINE 150 MG: 100 SUSPENSION ORAL at 05:27

## 2018-02-17 RX ADMIN — CHOLECALCIFEROL TAB 25 MCG (1000 UNIT) 2000 UNITS: 25 TAB at 09:43

## 2018-02-17 RX ADMIN — LEVOTHYROXINE SODIUM 150 MCG: 150 TABLET ORAL at 06:57

## 2018-02-17 RX ADMIN — INSULIN ASPART 1 UNITS: 100 INJECTION, SOLUTION INTRAVENOUS; SUBCUTANEOUS at 21:42

## 2018-02-17 RX ADMIN — IPRATROPIUM BROMIDE AND ALBUTEROL SULFATE 3 ML: .5; 3 SOLUTION RESPIRATORY (INHALATION) at 07:12

## 2018-02-17 RX ADMIN — TAZOBACTAM SODIUM AND PIPERACILLIN SODIUM 3.38 G: 375; 3 INJECTION, SOLUTION INTRAVENOUS at 16:35

## 2018-02-17 RX ADMIN — INSULIN ASPART 1 UNITS: 100 INJECTION, SOLUTION INTRAVENOUS; SUBCUTANEOUS at 00:12

## 2018-02-17 RX ADMIN — HYDROCORTISONE 20 MG: 10 TABLET ORAL at 09:43

## 2018-02-17 RX ADMIN — POTASSIUM CHLORIDE 20 MEQ: 1.5 POWDER, FOR SOLUTION ORAL at 12:26

## 2018-02-17 RX ADMIN — IPRATROPIUM BROMIDE AND ALBUTEROL SULFATE 3 ML: .5; 3 SOLUTION RESPIRATORY (INHALATION) at 16:23

## 2018-02-17 RX ADMIN — RANITIDINE HYDROCHLORIDE 150 MG: 150 SOLUTION ORAL at 09:42

## 2018-02-17 RX ADMIN — TAZOBACTAM SODIUM AND PIPERACILLIN SODIUM 3.38 G: 375; 3 INJECTION, SOLUTION INTRAVENOUS at 04:37

## 2018-02-17 RX ADMIN — Medication 15 ML: at 12:26

## 2018-02-17 RX ADMIN — TAZOBACTAM SODIUM AND PIPERACILLIN SODIUM 3.38 G: 375; 3 INJECTION, SOLUTION INTRAVENOUS at 21:30

## 2018-02-17 RX ADMIN — Medication 1 PACKET: at 16:38

## 2018-02-17 RX ADMIN — Medication 1 PACKET: at 12:29

## 2018-02-17 RX ADMIN — BRIVARACETAM 100 MG: 10 SOLUTION ORAL at 09:45

## 2018-02-17 RX ADMIN — SODIUM CHLORIDE: 9 INJECTION, SOLUTION INTRAVENOUS at 10:53

## 2018-02-17 RX ADMIN — CARBAMAZEPINE 150 MG: 100 SUSPENSION ORAL at 18:43

## 2018-02-17 RX ADMIN — INSULIN ASPART 1 UNITS: 100 INJECTION, SOLUTION INTRAVENOUS; SUBCUTANEOUS at 04:37

## 2018-02-17 RX ADMIN — CARBAMAZEPINE 150 MG: 100 SUSPENSION ORAL at 00:10

## 2018-02-17 RX ADMIN — INSULIN ASPART 1 UNITS: 100 INJECTION, SOLUTION INTRAVENOUS; SUBCUTANEOUS at 12:26

## 2018-02-17 RX ADMIN — HYDROCORTISONE 10 MG: 10 TABLET ORAL at 18:43

## 2018-02-17 RX ADMIN — IPRATROPIUM BROMIDE AND ALBUTEROL SULFATE 3 ML: .5; 3 SOLUTION RESPIRATORY (INHALATION) at 11:30

## 2018-02-17 RX ADMIN — ASPIRIN 81 MG 81 MG: 81 TABLET ORAL at 09:42

## 2018-02-17 RX ADMIN — Medication 1 PACKET: at 21:43

## 2018-02-17 ASSESSMENT — ACTIVITIES OF DAILY LIVING (ADL)
SWALLOWING: 2-->DIFFICULTY SWALLOWING FOODS
RETIRED_EATING: 3-->ASSISTIVE EQUIPMENT AND PERSON
WHICH_OF_THE_ABOVE_FUNCTIONAL_RISKS_HAD_A_RECENT_ONSET_OR_CHANGE?: FALL HISTORY
DRESS: 3-->ASSISTIVE EQUIPMENT AND PERSON
FALL_HISTORY_WITHIN_LAST_SIX_MONTHS: YES
TRANSFERRING: 3-->ASSISTIVE EQUIPMENT AND PERSON
COGNITION: 2 - DIFFICULTY WITH ORGANIZING THOUGHTS
TOILETING: 3-->ASSISTIVE EQUIPMENT AND PERSON
BATHING: 3-->ASSISTIVE EQUIPMENT AND PERSON
AMBULATION: 3-->ASSISTIVE EQUIPMENT AND PERSON
NUMBER_OF_TIMES_PATIENT_HAS_FALLEN_WITHIN_LAST_SIX_MONTHS: 1
RETIRED_COMMUNICATION: 2-->DIFFICULTY SPEAKING (NOT RELATED TO LANGUAGE BARRIER)

## 2018-02-17 NOTE — PLAN OF CARE
Problem: Patient Care Overview  Goal: Plan of Care/Patient Progress Review  Pt transferred to floor from ICU. VSS. Tele SR. Denies pain. Episode of coughing, attempted suction but pt refused. SaO2 93-98% on 5LPM NC. Tube feeding @ 40ml/hr. NS TKO. LS coarse. Continue to monitor.

## 2018-02-17 NOTE — PLAN OF CARE
Problem: Patient Care Overview  Goal: Plan of Care/Patient Progress Review  Discharge Planner SLP   Patient plan for discharge: Mom plans for pt to return home with  SLP/PT/OT/RN services  Current status: SLP: Orders received. Chart reviewed and discussed with RN. Pt familiar to SLP with history of recurrent pneumonia, profound dysphagia, and pt's mom feeding pt for comfort despite risk of aspiration. Lengthy discussion with pt's mom via phone today. Mom reports that since pt's last pneumonia, she has not fed pt orally due to aspiration risk. Home Health SLP is coming 2x/wk and working on oral cares and strengthening with no PO trials attempted. Mom hopeful that pt can progress back to comfort feedings, but does understand that pt will likely need PEG for nutrition. Educated mom on Right Care, Right Time principles. Goals in acute care setting are to improve medically to return home with aggressive therapy. SLP services in hospital are not indicated due to severity of deficits at baseline, no change in baseline status and pt's medical status with difficulty maintaining level of alertness. Mom verbalized agreement with recommended: continue strict NPO with aggressive oral cares. Encourage pt to swallow secretions frequently. Please write orders on discharge for continued Home Health SLP services. ST will sign off at this time. Please page SLP at [phone] if questions.  Barriers to return to prior living situation: None  Recommendations for discharge: Home with  SLP  Rationale for recommendations: Will defer evaluation and treatment to kaur  SLP       Entered by: Ryanne Hutton 02/17/2018 3:07 PM

## 2018-02-17 NOTE — PROGRESS NOTES
[facility]    Hospitalist Progress Note    Assessment & Plan   Keyon Farias is a 57 year old male who was admitted on 2/14/2018 with sepsis, acute hypoxemic respiratory failure in the setting of RLL pneumonia. He was in the ICU for hypotension, but quickly improved with levophed, fluids and antibiotics and transferred to the floor on 2/16 for further cares    subsequently improving and the hospitalist service was consulted for transfer of care.   Sepsis due to right lower lobe pneumonia.  Chest x-ray on February 14th shows a small to moderate-sized hazy opacity in the inferior right lung  Zosyn, Procalcitonin is elevated at 1.85.    - now on room air with WBC normal  - continue zosyn today given his elevated procal awaiting cultures  - possible discharge in the next few days  - there is concern for aspiration, I informed mother that there is not likely much to do given his significant TBI and he will always be at risk, despite the tube feeds. ST consulted but right now they are planning palliative feedings despite aspiration risk. He is npo for now.    Traumatic brain injury with aphasia, dysphasia, status post G-tube placement and right spastic hemiplegia.   - continue the plan with the tube feeds    Chronic medical conditions  Seizure disorder thought due to traumatic brain injury.  Continue prior to admission Tegretol.   Panhypopituitarism: on steroids but back to baseline  History of necrotizing pancreatitis.  noted  Anemia no e/o bleeding, actually increasing  Deep venous thrombosis prophylaxis.  This is with pneumatic compression devices.   Code status:  The patient is listed as FULL CODE  historically.        # Pain Assessment:   Current Pain Score 2/17/2018 2/17/2018 2/17/2018   Patient currently in pain? denies no denies   Pain score (0-10) - - -   Pain location - - -   Pain descriptors - - -   rFLACC pain score - - -   CPOT pain score - - -   - Keyon is unable to participate in a  collaborative plan for pain management due to aphasia.   - Please see the plan for pain management as documented above      DVT Prophylaxis: Pneumatic Compression Devices  Code Status: Full Code    Disposition: Expected discharge in 1-2 days days once he shows continued stability.    Regino Lazcano, DO  Text Page  (7am to 6pm)  Interval History   Transferred to floor  No bleeding  No on room air  History unobtainable secondary to his TBI and aphasi    -Data reviewed today: I reviewed all new labs and imaging results over the last 24 hours. I personally reviewed no images or EKG's today.    Physical Exam   Temp: 98.7  F (37.1  C) Temp src: Axillary BP: 131/84   Heart Rate: 87 Resp: (!) 31 SpO2: 90 % O2 Device: None (Room air) Oxygen Delivery: 5 LPM  Vitals:    02/15/18 1400 02/16/18 0400 02/17/18 0700   Weight: 71.7 kg (158 lb 1.1 oz) 73 kg (160 lb 15 oz) 76 kg (167 lb 8.8 oz)     Vital Signs with Ranges  Temp:  [97.4  F (36.3  C)-100.2  F (37.9  C)] 98.7  F (37.1  C)  Heart Rate:  [] 87  Resp:  [8-37] 31  BP: ()/(56-84) 131/84  SpO2:  [75 %-100 %] 90 %  I/O last 3 completed shifts:  In: 1860 [I.V.:140; NG/GT:600]  Out: 1780 [Urine:1780]    Constitutional: Awake, alert, cooperative, no apparent distress  Respiratory: Clear to auscultation bilaterally, no crackles or wheezing   Cardiovascular: Regular rate and rhythm, normal S1 and S2, and no murmur noted  GI: Normal bowel sounds, soft, non-distended, non-tender  Skin/Integumen: No rashes, no cyanosis, no edema  Other:     Medications     IV fluid REPLACEMENT ONLY       sodium chloride 10 mL/hr at 02/17/18 1100       carBAMazepine  150 mg Per J Tube Q6H     piperacillin-tazobactam  3.375 g Intravenous Q6H     insulin aspart  1-6 Units Subcutaneous Q4H     multivitamins with minerals  15 mL Per Feeding Tube Daily     hydrocortisone  10 mg Per J Tube QPM     cholecalciferol  2,000 Units Per J Tube Daily     Brivaracetam  100 mg Per J Tube BID      rantidine  150 mg Oral BID     sodium chloride (PF)  10 mL Intracatheter Q8H     sodium chloride (PF)  10 mL Intracatheter Q8H     ipratropium - albuterol 0.5 mg/2.5 mg/3 mL  3 mL Nebulization 4x Daily     aspirin  81 mg Per J Tube Daily     fiber modular  1 packet Per Feeding Tube TID     hydrocortisone  20 mg Per J Tube QAM     levothyroxine  150 mcg Per J Tube Daily       Data     Recent Labs  Lab 02/17/18  0530 02/16/18  1536 02/16/18  0515  02/15/18  0610  02/14/18  1300 02/14/18  0350   WBC 6.6  --  7.6  --  14.4*  --  18.7* 14.8*   HGB 11.7*  --  11.4*  --  12.2*  --  12.0* 16.0   MCV 92  --  93  --  93  --  92 91   *  --  96*  --  117*  --  111* 140*    143 145*  < > 146*  < > 142 138   POTASSIUM 3.7 3.7 3.6  < > 3.9  < > 4.1 3.8   CHLORIDE 107  --  113*  --  115*  --  110* 100   CO2 30  --  28  --  26  --  25 31   BUN 12  --  9  --  12  --  16 20   CR 0.96  --  0.99  --  1.15  --  1.16 1.09   ANIONGAP 5  --  4  --  5  --  7 7   STEVE 8.3*  --  8.5  --  8.2*  --  7.2* 8.8   * 110* 133*  < > 223*  --  95 116*   ALBUMIN  --   --   --   --   --   --  2.3* 3.3*   PROTTOTAL  --   --   --   --   --   --  5.8* 8.0   BILITOTAL  --   --   --   --   --   --  0.6 0.5   ALKPHOS  --   --   --   --   --   --  66 98   ALT  --   --   --   --   --   --  25 36   AST  --   --   --   --   --   --  18 33   < > = values in this interval not displayed.    Imaging:   No results found for this or any previous visit (from the past 24 hour(s)).

## 2018-02-17 NOTE — PLAN OF CARE
Problem: Patient Care Overview  Goal: Plan of Care/Patient Progress Review  Baseline neuro status, following commands with left arm. 3L NC sats 91-97%. Will desatt with oxygen off to 87%. At times very strong cough especially when oob to chair. Encouraged to cough and attemped to [place] back of mouth and nasally suction but unable to as patient not compliant. Mother in today and updated on plan of care. Continue with aggressive pulmonary toilet.

## 2018-02-17 NOTE — CONSULTS
Consult Date:  02/16/2018      TRANSFER OF CARE SUMMARY      DATE OF SUMMARY:  02/16/2018      PRIMARY CARE PHYSICIAN:  [facility].      REQUESTING PHYSICIAN:  Demarco Ash MD, of the intensivist service.      REASON FOR CONSULTATION:  Transfer of care.      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  The patient lives with his mother.  She had recorded a temperature of 104 the morning of February 14th. She gave him Tylenol through the G-tube.  She had also noted that he had been coughing a lot.  No vomiting.  The patient has also been receiving frequent nebulizers.  She was concerned as patient had pneumonia this past winter and has had a cough intermittently since then, but it worsened in the previous days. The patient denied any pain.  In the emergency department the patient was found to have a temperature of 101.4 with heart rate in 130s to 140s.  He was hypoxic.  His blood pressures were hypotensive in the 70s to 80s systolic.  His lactic acid was elevated at 4.1.  WBC was 14.8 with hemoglobin 16.0, platelet count 140.  His CMP was relatively unremarkable.  Blood cultures were obtained.  Chest x-ray showed a small to moderate-sized region of hazy opacities in the inferior right lung which were new since previous, representative of pneumonia.  EKG showed sinus tachycardia with left anterior fascicular block.  The patient was admitted to the ICU for further cares.      The patient was managed in the intensive care unit and was placed on IV Zosyn.  He has been encephalopathic but reportedly has been becoming more awake.  He was treated with IV Levophed which was stopped approximately  24 hours ago.  His blood pressure has been soft, but stable.  His temperature is still mildly elevated with T-max of 100.8.  His oxygen saturations are improving with decreasing amounts of oxygen.  Heart rate has also improved.  He is being transferred to [facility] for further cares.      PAST MEDICAL HISTORY:   1.  Ventricular tachy arrhythmia/ventricular fibrillation.   2.  Urinary tract infections.   3.  History of traumatic brain injury with right-sided spasticity and hemiplegia.   4.  Dysphasia with G-tube placement.   5.  Aphasia.    6.  History of tracheostomy with subsequent closure.   7.  Thyroid disease.   8.  History of septic shock and pneumonia.   9.  Partial seizures.   10.  Panhypopituitarism.   11.  GERD.   12.  History of DVT.      PAST SURGICAL HISTORY:    Past Surgical History:   Procedure Laterality Date     ENDOSCOPIC ULTRASOUND UPPER GASTROINTESTINAL TRACT (GI) N/A 1/30/2017    Procedure: ENDOSCOPIC ULTRASOUND, ESOPHAGOSCOPY / UPPER GASTROINTESTINAL TRACT (GI);  Surgeon: Jus Montana MD;  Location: [facility] OR     ENDOSCOPIC ULTRASOUND, ESOPHAGOSCOPY, GASTROSCOPY, DUODENOSCOPY (EGD), NECROSECTOMY N/A 2/7/2017    Procedure: ENDOSCOPIC ULTRASOUND, ESOPHAGOSCOPY, GASTROSCOPY, DUODENOSCOPY (EGD), NECROSECTOMY;  Surgeon: Jack Marcus MD;  Location: [facility] OR     ESOPHAGOSCOPY, GASTROSCOPY, DUODENOSCOPY (EGD), COMBINED  3/13/2014    Procedure: COMBINED ESOPHAGOSCOPY, GASTROSCOPY, DUODENOSCOPY (EGD), BIOPSY SINGLE OR MULTIPLE;  gastroscopy;  Surgeon: Digna Rhodes MD;  Location:  GI     ESOPHAGOSCOPY, GASTROSCOPY, DUODENOSCOPY (EGD), COMBINED N/A 12/6/2016    Procedure: COMBINED ESOPHAGOSCOPY, GASTROSCOPY, DUODENOSCOPY (EGD);  Surgeon: Digna Rhodes MD;  Location:  GI     ESOPHAGOSCOPY, GASTROSCOPY, DUODENOSCOPY (EGD), COMBINED N/A 2/7/2017    Procedure: COMBINED ENDOSCOPIC ULTRASOUND, ESOPHAGOSCOPY, GASTROSCOPY, DUODENOSCOPY (EGD), FINE NEEDLE ASPIRATE/BIOPSY;   Surgeon: Too Thakur MD;  Location:  OR     HEAD & NECK SURGERY      reconstructive facial surgery following accident in 1989     LAPAROSCOPIC APPENDECTOMY  7/30/2013    Procedure: LAPAROSCOPIC APPENDECTOMY;  LAPAROSCOPIC APPENDECTOMY;  Surgeon: Manish Pierce MD;  Location:  OR     LAPAROSCOPIC ASSISTED INSERTION TUBE GASTROTOMY N/A 9/7/2016    Procedure: LAPAROSCOPIC ASSISTED INSERTION TUBE GASTROSTOMY;  Surgeon: Manish Pierce MD;  Location:  OR     ORTHOPEDIC SURGERY      right hand repair     TRACHEOSTOMY N/A 9/3/2016    Procedure: TRACHEOSTOMY;  Surgeon: João Ortiz MD;  Location:  OR     TRACHEOSTOMY N/A 12/2/2016    Procedure: TRACHEOSTOMY;  Surgeon: João Ortiz MD;  Location:  OR     VASCULAR SURGERY           FAMILY HISTORY:  Reviewed and noncontributory to this presentation.      SOCIAL HISTORY:  The patient is a former smoker and quit in 1999.  No alcohol use.  Resides in his mother's home where she is the primary caregiver.      PRIOR TO ADMISSION MEDICATIONS:    Prior to Admission medications    Medication Sig Last Dose Taking? Auth Provider   nystatin (MYCOSTATIN) 612029 UNIT/GM POWD Apply three times daily to the groin for 5 days.  Yes Yogi Irizarry MD   carBAMazepine (TEGRETOL) 100 MG/5ML suspension Take 150 mg by mouth every 6 hours 2/14/2018 at Unknown time Yes Unknown, Entered By History   melatonin (MELATONIN) 1 MG/ML LIQD liquid 6 mg by Jejunal Tube route nightly as needed for sleep 2/13/2018 at Unknown time Yes Unknown, Entered By History   Brivaracetam (BRIVIACT) 10 MG/ML solution Take 100 mg by mouth 2 times daily 2/14/2018 at Unknown time Yes Reported, Patient   pantoprazole (PROTONIX) SUSP suspension Take 20 mg by mouth daily  2/13/2018 at Unknown time Yes Reported, Patient   potassium & sodium phosphates (NEUTRA-PHOS) 280-160-250 MG Packet Take 1 packet by mouth 3 times daily 0900, 1500, 2100.  2/13/2018 at Unknown time Yes  Unknown, Entered By History   VITAMIN D, CHOLECALCIFEROL, PO Take 2,000 Units by mouth daily 2/13/2018 at Unknown time Yes Unknown, Entered By History   bacitracin ointment Apply topically daily as needed for wound care To PEG site.  Yes Unknown, Entered By History   aspirin 10 mg/mL 8.1 mLs (81 mg) by Per J Tube route daily 2/13/2018 at Unknown time Yes Mariana Venegas MD   multivitamins with minerals (CERTAVITE/CEROVITE) LIQD liquid 15 mLs by Per J Tube route daily 2/13/2018 at Unknown time Yes Mariana Venegas MD   fiber modular (NUTRISOURCE FIBER) packet 1 packet by Per Feeding Tube route 3 times daily  Yes Mariana Venegas MD   calcium carbonate 1250 (500 CA) MG/5ML SUSP suspension 5 mLs (1,250 mg) by Per J Tube route 3 times daily (with meals) 2/13/2018 at Unknown time Yes Mariana Venegas MD   levothyroxine (SYNTHROID) 25 mcg/mL 6 mLs (150 mcg) by Per J Tube route every morning (before breakfast)  Patient taking differently: 150 mcg by Per J Tube route every morning (before breakfast) Hold tube feeding 1 hour prior and 1 hour after medication administered. 2/13/2018 at Unknown time Yes Mariana Venegas MD   hydrocortisone (CORTEF) 2 mg/mL 10 mLs (20 mg) by Per J Tube route every morning 2/13/2018 at Unknown time Yes Mariana Venegas MD   hydrocortisone (CORTEF) 2 mg/mL Take 5 mLs (10 mg) by mouth every evening 2/13/2018 at Unknown time Yes Mariana Venegas MD   bisacodyl (DULCOLAX) 10 MG Suppository Place 1 suppository (10 mg) rectally daily as needed for constipation  Yes Mariana Venegas MD   miconazole (MICATIN; MICRO GUARD) 2 % powder Apply topically every hour as needed for other (topical candidiasis)  Yes Alicia Roca APRN CNP   ACETAMINOPHEN  mg by Per Feeding Tube route every 4 hours as needed for pain  Yes Unknown, Entered By History   testosterone cypionate (DEPOTESTOTERONE CYPIONATE) 200 MG/ML injection Inject 100 mg  into the muscle See Admin Instructions Every 2-3 weeks. 2/13/2018 at Unknown time Yes Unknown, Entered By History   albuterol (2.5 MG/3ML) 0.083% neb solution Take 1 vial (2.5 mg) by nebulization every 4 hours as needed for shortness of breath / dyspnea   Tamara Mcwilliams MD     ALLERGIES:  DILANTIN AND VALPROIC ACID.      REVIEW OF SYSTEMS:  A complete 10-point review of systems was performed and is negative other than the items previously mentioned above HPI.      PHYSICAL EXAMINATION:   VITAL SIGNS:  Blood pressure 98/63, heart rate 84 beats per minute, temperature 99.5, respiratory rate 22, oxygen saturation 96% on 3 L of oxygen by nasal cannula.   GENERAL:  The patient is somnolent but arouses to voice.  He is chronically ill-appearing.  Otherwise, calm, lying in bed in no apparent distress.   EYES:  Pupils equal, round and reactive to light.  Extraocular movements grossly intact.   HENT:  Head: Normocephalic.  Throat:  Lips, mucosa and tongue appear dry.   NECK:  Supple.   CARDIOVASCULAR:  Heart regular rate and rhythm, no murmurs, rubs or gallops.  Distal pulses are intact.   PULMONARY:  Coarse breath sounds bilaterally, normal work of breathing.   GASTROINTESTINAL:  Abdomen is soft, mildly distended, nontender with normoactive bowel sounds.  G-tube is present.   MUSCULOSKELETAL:  Spastic right hemiplegia noted otherwise limbs appear atraumatic.   SKIN:  Warm, dry, scattered excoriations but no other rashes.   NEUROLOGIC:  Somnolent right hemiplegia.   PSYCHIATRIC:  Normal mood and affect.  Follows simple 1-2 step commands. Opens eyes to verbal cues.      LABORATORY DATA:  As reviewed in Epic and detailed above.      IMAGING:  Chest x-ray with new right-sided opacity since January 6th.       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  He was found to have a new right-sided opacity on x-ray, new since January 6th and was septic.  Initially he was admitted to the intensive care unit requiring pressors and broad-spectrum IV antibiotics.  He is subsequently improving and the hospitalist service was consulted for transfer of care.     1.  Sepsis due to right lower lobe pneumonia. The patient presenting with high fevers and increased cough from baseline.  Chest x-ray on February 14th shows a small to moderate-sized hazy opacity in the inferior right lung, new since January 6th, likely representative of pneumonia.  The patient is being treated with IV Zosyn, which we will continue for now.  Procalcitonin is elevated at 1.85.  White blood cell count has improved.  His oxygen requirements are decreasing.  Blood pressure is soft, but stable.  T-max of 100.8.  Will continue to monitor.  Pulmonary toilet as able.  Also, continue with nebulizers.     2.  Traumatic brain injury with aphasia, dysphasia, status post G-tube placement and right spastic hemiplegia.  The patient has a G-tube present.  He has been reintroduced on tube feedings.  Will continue these with a nutrition consult.     3.  Seizure disorder thought due to traumatic brain injury.  Continue prior to admission Tegretol.     4.  Panhypopituitarism.  Felt secondary to traumatic brain injury.  Continue with prior to admission hydrocortisone and Synthroid. Stress dose steroids appear to have been given initially but he is now back down to his home dosing, so I would not increase at this point.     5.  History of necrotizing pancreatitis.  The patient was hospitalized in 02/2017.  The patient had a cystogastrostomy tube placed at that time with cultures growing highly resistant pseudomonas and vancomycin-resistant enterococcus.  This has been stable.      6.  Hypernatremia. Improving.  Monitor while on tube feeds.     7.  Anemia.  The patient's hemoglobin on admission was 16.0, now down to 11.4 as of 2016.  No signs of active bleeding.  Potentially this drop could be at least in part dilutional, but will repeat a CBC in the morning and monitor for any other signs of bleeding.     8.  Deep venous thrombosis prophylaxis.  This is with pneumatic compression devices.     9.  Code status:  The patient is listed as FULL CODE  historically.      This patient was discussed with Dr[facility] Jerald Stoddard [facility].  He is in agreement with my assessment and plan of care.         JERALD STODDARD MD       As dictated by EDER BOUDREAUX PA-C            D: 2018   T: 2018   MT: KAYA      Name:     BERT BARAJAS   MRN:      [id number]        Account:       [id number]   :      1960           Consult Date:  2018      Document: [id number]       cc: [facility]

## 2018-02-17 NOTE — PLAN OF CARE
Problem: Patient Care Overview  Goal: Plan of Care/Patient Progress Review  Outcome: Improving  Room air today. Mouth breathing noted when pt sleeping, 2 L oxymask applied intermittently. Speech and PT seeing pt. Turned q2hrs, TF infusing, coccynx, dressing changed with stooling, pink, intact skin. Heel protector boots in place. Removed for skin check. SS consult to resume home cares. Mother would like him home at NC.

## 2018-02-17 NOTE — PLAN OF CARE
Problem: Patient Care Overview  Goal: Plan of Care/Patient Progress Review  Discharge Planner PT   Patient plan for discharge: per mother would like to return home with HHPT ([facility])   Current status: completed supine UE and LE strengthening, stretching and ROM to prevent contracture in setting of immobility and to progress functional strength for bed mobility and transfers. Pt indicating no ROM to R UE despite encouragement, appears too painful with spasticity. Completed PROM/AAROM in all planes of motion to R LE, AROM/AAROM to LLE and resisted exercise to LUE. Pt nods head yes when asked if activity fatiguing, VSS throughout. Called pt's mother at end of session to clarify pt's baseline for functional transfer. Mother reports pt completes sit <> stand and stand pivot with A x 1-2 at baseline, does not use any lift equipment  Barriers to return to prior living situation: none  Recommendations for discharge: close to baseline, appropriate to return home with prior level of assist and resumption of HHPT  Rationale for recommendations: HHPT to continue progression of functional strength and ROM and increased indep with transfers       Entered by: Gely Banks 02/17/2018 4:07 PM

## 2018-02-17 NOTE — PLAN OF CARE
Problem: Patient Care Overview  Goal: Plan of Care/Patient Progress Review  Outcome: Improving  Pt on 5L NC following coughing episode.  KOLTON Coarse.  Report called to Breanna CHRISTINE in CCU.  Pt transferred with flying squad to [facility].  Family updated on pt tx.

## 2018-02-17 NOTE — PROGRESS NOTES
Physician Attestation   I, Migel Stoddard, saw and evaluated Keyon Farias as part of a shared visit.  I have reviewed and discussed with the advanced practice provider their history, physical and plan.    I personally reviewed the vital signs, medications, labs and imaging.    My key history or physical exam findings:   57 year old male with past history TBI with right spastic hemiplegia, aphasia, dysphagia s/p G-tube, seizure disorder, panhypopituitarism with sepsis picture.  Initially on pressors; now off; BP still soft in the high 90s  CXR- new  Rt sided opacity new since 1/6      Temp: 99.5  F (37.5  C) Temp src: Bladder BP: 98/63   Heart Rate: 84 Resp: 22 SpO2: 96 % O2 Device: Nasal cannula Oxygen Delivery: 3 LPM    Gen: Awake; somnolent; opens eyes to verbal cues, follows 1-2 simple commands  HEENT: Supple neck  Resp: coarse BS, nl WOB  CVS- RRR, no murmur, no edema  Abd/GI: mild distension, non-tender. BS- normoactive, G-tube  Skin- Warm  MSK/Neuro- spastic rt hemiplegia      Key management decisions made by me:     D/w intensivist; transfer out of ICU  Procal still high, still with low grade fever;  favor to continue zosyn; hx of infection with resistant organisms.  If improving and cultures continue to stay negative; PO abx in the next 24-48 hrs.   OP f/u recent abnormal CT abd(ER visit)    Migel Stoddard  Date of Service (when I saw the patient): 02/16/18

## 2018-02-17 NOTE — PROGRESS NOTES
Per PT, Patient has [facility] PCA/RN/PT services. Mom would like to continue with same services and same Therapists.  # [phone]

## 2018-02-18 LAB
ANION GAP SERPL CALCULATED.3IONS-SCNC: 7 MMOL/L (ref 3–14)
BASOPHILS # BLD AUTO: 0 10E9/L (ref 0–0.2)
BASOPHILS NFR BLD AUTO: 0.6 %
BUN SERPL-MCNC: 12 MG/DL (ref 7–30)
CALCIUM SERPL-MCNC: 7.9 MG/DL (ref 8.5–10.1)
CHLORIDE SERPL-SCNC: 105 MMOL/L (ref 94–109)
CO2 SERPL-SCNC: 26 MMOL/L (ref 20–32)
CREAT SERPL-MCNC: 0.76 MG/DL (ref 0.66–1.25)
DIFFERENTIAL METHOD BLD: ABNORMAL
EOSINOPHIL # BLD AUTO: 1.2 10E9/L (ref 0–0.7)
EOSINOPHIL NFR BLD AUTO: 16 %
ERYTHROCYTE [DISTWIDTH] IN BLOOD BY AUTOMATED COUNT: 12.9 % (ref 10–15)
GFR SERPL CREATININE-BSD FRML MDRD: >90 ML/MIN/1.7M2
GLUCOSE BLDC GLUCOMTR-MCNC: 107 MG/DL (ref 70–99)
GLUCOSE BLDC GLUCOMTR-MCNC: 120 MG/DL (ref 70–99)
GLUCOSE BLDC GLUCOMTR-MCNC: 124 MG/DL (ref 70–99)
GLUCOSE BLDC GLUCOMTR-MCNC: 159 MG/DL (ref 70–99)
GLUCOSE BLDC GLUCOMTR-MCNC: 166 MG/DL (ref 70–99)
GLUCOSE SERPL-MCNC: 120 MG/DL (ref 70–99)
HCT VFR BLD AUTO: 35.5 % (ref 40–53)
HGB BLD-MCNC: 12.1 G/DL (ref 13.3–17.7)
IMM GRANULOCYTES # BLD: 0 10E9/L (ref 0–0.4)
IMM GRANULOCYTES NFR BLD: 0.4 %
LYMPHOCYTES # BLD AUTO: 2.5 10E9/L (ref 0.8–5.3)
LYMPHOCYTES NFR BLD AUTO: 34.2 %
MCH RBC QN AUTO: 30.8 PG (ref 26.5–33)
MCHC RBC AUTO-ENTMCNC: 34.1 G/DL (ref 31.5–36.5)
MCV RBC AUTO: 90 FL (ref 78–100)
MONOCYTES # BLD AUTO: 0.9 10E9/L (ref 0–1.3)
MONOCYTES NFR BLD AUTO: 11.7 %
NEUTROPHILS # BLD AUTO: 2.7 10E9/L (ref 1.6–8.3)
NEUTROPHILS NFR BLD AUTO: 37.1 %
NRBC # BLD AUTO: 0 10*3/UL
NRBC BLD AUTO-RTO: 0 /100
PHOSPHATE SERPL-MCNC: 2.5 MG/DL (ref 2.5–4.5)
PLATELET # BLD AUTO: 159 10E9/L (ref 150–450)
POTASSIUM SERPL-SCNC: 3.6 MMOL/L (ref 3.4–5.3)
RBC # BLD AUTO: 3.93 10E12/L (ref 4.4–5.9)
SODIUM SERPL-SCNC: 138 MMOL/L (ref 133–144)
WBC # BLD AUTO: 7.3 10E9/L (ref 4–11)

## 2018-02-18 PROCEDURE — 25000125 ZZHC RX 250: Performed by: NURSE PRACTITIONER

## 2018-02-18 PROCEDURE — 40000885 ZZH STATISTIC STEP DOWN HRS EVENING

## 2018-02-18 PROCEDURE — 84100 ASSAY OF PHOSPHORUS: CPT | Performed by: PHYSICIAN ASSISTANT

## 2018-02-18 PROCEDURE — 99232 SBSQ HOSP IP/OBS MODERATE 35: CPT | Performed by: INTERNAL MEDICINE

## 2018-02-18 PROCEDURE — 00000146 ZZHCL STATISTIC GLUCOSE BY METER IP

## 2018-02-18 PROCEDURE — 40000239 ZZH STATISTIC VAT ROUNDS

## 2018-02-18 PROCEDURE — 94640 AIRWAY INHALATION TREATMENT: CPT | Mod: 76

## 2018-02-18 PROCEDURE — A9270 NON-COVERED ITEM OR SERVICE: HCPCS | Mod: GY | Performed by: ANESTHESIOLOGY

## 2018-02-18 PROCEDURE — A9270 NON-COVERED ITEM OR SERVICE: HCPCS | Mod: GY | Performed by: NURSE PRACTITIONER

## 2018-02-18 PROCEDURE — 94640 AIRWAY INHALATION TREATMENT: CPT

## 2018-02-18 PROCEDURE — 40000275 ZZH STATISTIC RCP TIME EA 10 MIN

## 2018-02-18 PROCEDURE — 25000132 ZZH RX MED GY IP 250 OP 250 PS 637: Mod: GY | Performed by: NURSE PRACTITIONER

## 2018-02-18 PROCEDURE — 40000886 ZZH STATISTIC STEP DOWN HRS DAY

## 2018-02-18 PROCEDURE — 25000128 H RX IP 250 OP 636: Performed by: PHYSICIAN ASSISTANT

## 2018-02-18 PROCEDURE — 85025 COMPLETE CBC W/AUTO DIFF WBC: CPT | Performed by: PHYSICIAN ASSISTANT

## 2018-02-18 PROCEDURE — 80048 BASIC METABOLIC PNL TOTAL CA: CPT | Performed by: PHYSICIAN ASSISTANT

## 2018-02-18 PROCEDURE — A9270 NON-COVERED ITEM OR SERVICE: HCPCS | Mod: GY | Performed by: INTERNAL MEDICINE

## 2018-02-18 PROCEDURE — 25000132 ZZH RX MED GY IP 250 OP 250 PS 637: Mod: GY | Performed by: ANESTHESIOLOGY

## 2018-02-18 PROCEDURE — 25000125 ZZHC RX 250: Performed by: ANESTHESIOLOGY

## 2018-02-18 PROCEDURE — 25800025 ZZH RX 258: Performed by: INTERNAL MEDICINE

## 2018-02-18 PROCEDURE — 40000884 ZZH STATISTIC STEP DOWN HRS NIGHT

## 2018-02-18 PROCEDURE — 21000000 ZZH R&B IMCU HEART CARE

## 2018-02-18 PROCEDURE — 25000132 ZZH RX MED GY IP 250 OP 250 PS 637: Mod: GY | Performed by: INTERNAL MEDICINE

## 2018-02-18 PROCEDURE — 99207 ZZC CDG-MDM COMPONENT: MEETS LOW - DOWN CODED: CPT | Performed by: INTERNAL MEDICINE

## 2018-02-18 PROCEDURE — 25000128 H RX IP 250 OP 636: Performed by: ANESTHESIOLOGY

## 2018-02-18 RX ADMIN — Medication 1 PACKET: at 21:22

## 2018-02-18 RX ADMIN — CARBAMAZEPINE 150 MG: 100 SUSPENSION ORAL at 18:46

## 2018-02-18 RX ADMIN — Medication 1 PACKET: at 15:57

## 2018-02-18 RX ADMIN — TAZOBACTAM SODIUM AND PIPERACILLIN SODIUM 3.38 G: 375; 3 INJECTION, SOLUTION INTRAVENOUS at 15:54

## 2018-02-18 RX ADMIN — RANITIDINE HYDROCHLORIDE 150 MG: 150 SOLUTION ORAL at 08:31

## 2018-02-18 RX ADMIN — MICONAZOLE NITRATE: 2 POWDER TOPICAL at 18:41

## 2018-02-18 RX ADMIN — POTASSIUM CHLORIDE 20 MEQ: 1.5 POWDER, FOR SOLUTION ORAL at 21:21

## 2018-02-18 RX ADMIN — CHOLECALCIFEROL TAB 25 MCG (1000 UNIT) 2000 UNITS: 25 TAB at 08:31

## 2018-02-18 RX ADMIN — IPRATROPIUM BROMIDE AND ALBUTEROL SULFATE 3 ML: .5; 3 SOLUTION RESPIRATORY (INHALATION) at 11:51

## 2018-02-18 RX ADMIN — TAZOBACTAM SODIUM AND PIPERACILLIN SODIUM 3.38 G: 375; 3 INJECTION, SOLUTION INTRAVENOUS at 11:15

## 2018-02-18 RX ADMIN — DEXTROSE MONOHYDRATE AND SODIUM CHLORIDE: 5; .45 INJECTION, SOLUTION INTRAVENOUS at 16:00

## 2018-02-18 RX ADMIN — POTASSIUM PHOSPHATE, MONOBASIC AND POTASSIUM PHOSPHATE, DIBASIC 10 MMOL: 224; 236 INJECTION, SOLUTION INTRAVENOUS at 22:07

## 2018-02-18 RX ADMIN — IPRATROPIUM BROMIDE AND ALBUTEROL SULFATE 3 ML: .5; 3 SOLUTION RESPIRATORY (INHALATION) at 06:56

## 2018-02-18 RX ADMIN — TAZOBACTAM SODIUM AND PIPERACILLIN SODIUM 3.38 G: 375; 3 INJECTION, SOLUTION INTRAVENOUS at 05:07

## 2018-02-18 RX ADMIN — LEVOTHYROXINE SODIUM 150 MCG: 150 TABLET ORAL at 08:31

## 2018-02-18 RX ADMIN — ASPIRIN 81 MG 81 MG: 81 TABLET ORAL at 08:31

## 2018-02-18 RX ADMIN — CARBAMAZEPINE 150 MG: 100 SUSPENSION ORAL at 15:53

## 2018-02-18 RX ADMIN — RANITIDINE HYDROCHLORIDE 150 MG: 150 SOLUTION ORAL at 21:21

## 2018-02-18 RX ADMIN — Medication 1 SPRAY: at 18:45

## 2018-02-18 RX ADMIN — TAZOBACTAM SODIUM AND PIPERACILLIN SODIUM 3.38 G: 375; 3 INJECTION, SOLUTION INTRAVENOUS at 21:22

## 2018-02-18 RX ADMIN — BRIVARACETAM 100 MG: 10 SOLUTION ORAL at 21:30

## 2018-02-18 RX ADMIN — IPRATROPIUM BROMIDE AND ALBUTEROL SULFATE 3 ML: .5; 3 SOLUTION RESPIRATORY (INHALATION) at 19:09

## 2018-02-18 RX ADMIN — IPRATROPIUM BROMIDE AND ALBUTEROL SULFATE 3 ML: .5; 3 SOLUTION RESPIRATORY (INHALATION) at 17:13

## 2018-02-18 RX ADMIN — HYDROCORTISONE 20 MG: 10 TABLET ORAL at 08:31

## 2018-02-18 RX ADMIN — CARBAMAZEPINE 150 MG: 100 SUSPENSION ORAL at 01:09

## 2018-02-18 RX ADMIN — INSULIN ASPART 1 UNITS: 100 INJECTION, SOLUTION INTRAVENOUS; SUBCUTANEOUS at 01:09

## 2018-02-18 RX ADMIN — BRIVARACETAM 100 MG: 10 SOLUTION ORAL at 08:31

## 2018-02-18 RX ADMIN — CARBAMAZEPINE 150 MG: 100 SUSPENSION ORAL at 06:51

## 2018-02-18 RX ADMIN — HYDROCORTISONE 10 MG: 10 TABLET ORAL at 18:46

## 2018-02-18 RX ADMIN — Medication 15 ML: at 08:31

## 2018-02-18 NOTE — PROGRESS NOTES
[facility]    Hospitalist Progress Note    Date of Service (when I saw the patient): 02/18/2018    Assessment & Plan   Keyon Farias is a pleasant 57 year old gentlema who was admitted 2/14/2018 with sepsis and acute hypoxemic respiratory failure in the setting of RLL pneumonia. He was initially in the ICU due to hypotension, but quickly improved with levophed, fluids and antibiotics and was transferred to the floor on 2/16 for further care.  Current problems include:     Sepsis due to right lower lobe pneumonia; improving.  - continue zosyn ; sputum culture not sent.  Consider changing to non-IV ABx via G-port prior to discharge  - concern re. Risk aspiration - ongoing.  - appreciate eval. By SLT: continue palliative feedings despite aspiration risk; continue NPO     Traumatic brain injury with aphasia, dysphasia, and increased aspiration risk; stable.  Unable to continue w/ J-port TF today as RN's have been unable to unclog Keyon's J-port, in spite of usual attempts  - Int. Radiology to do GJ tube exchange tomorrow, 2/19  - IVF overnight until able to use GJ or new GJ tube     Chronic medical conditions; stable.    Seizure disorder thought due to traumatic brain injury; stable.  - continue prior to admission Tegretol; have restarted Brevaracitam    Panhypopituitarism; stable.  - continue hydrocortisone 20 mg Q a.m. And 10 mg Q p.m.    Anemia; Hgb stable.    Deep venous thrombosis prophylaxis: continue pneumatic compression devices.   Code status:  FULL CODE     Disposition: Expected discharge in 1-2 days.      JOHN Garcia MD, [facility]     Internal Medicine Hospitalist  Text Page (7am - 6pm)    Interval History   Reviewed care saniya Ta and his RNs at bedside.  No new problems o/n.  No f/c/sob; no chest or abdom. Sx.  RNs trouble-shooting GJ tube issues - not able to infuse TF via J-port.  Had 1 sm. Incontinent stool this a.m.  Occ. Cough; non-productive.  Not up in chair yet; has not been able to  stand/participate in ADL's as prior to admit yet.    -Data reviewed today: I reviewed all new labs and imaging results over the last 24 hours. I personally reviewed no images or EKG's today.    Physical Exam   Temp: 97.2  F (36.2  C) Temp src: Axillary BP: 110/74   Heart Rate: 73 Resp: 20 SpO2: 97 % O2 Device: None (Room air)    Vitals:    02/16/18 0400 02/17/18 0700 02/18/18 0500   Weight: 73 kg (160 lb 15 oz) 76 kg (167 lb 8.8 oz) 76 kg (167 lb 8.8 oz)     Vital Signs with Ranges  Temp:  [97.2  F (36.2  C)-98.9  F (37.2  C)] 97.2  F (36.2  C)  Heart Rate:  [] 73  Resp:  [11-26] 20  BP: ()/(54-87) 110/74  SpO2:  [90 %-97 %] 97 %  I/O last 3 completed shifts:  In: 1170 [I.V.:370; NG/GT:200]  Out: 1400 [Urine:1400]    Constitutional: no acute distress; lying in bed.  HEENT: sclerae clear; EOMI.  Tongue sl. Dry.  Neck: old trache. Site scar.  Respiratory: Good air entry bilaterally; faint inspir. Rhonchi R. Base; no wheezing; strong cough  Cardiovascular: S1, S2 present, regular rate and rhythm, without murmur, rubs or gallops  GI: abd. flat; positive bowel sounds, non-tender, non-distended  Skin/Integument: GJ site c/d/I; no erythema; no cyanosis, no rash  Musculoskeletal: no edema;   Neurologic: awake, answers ?'s w/ L. Thumbs up or down; follows directions well.  R. Arm flaccid; moves pillow around using his L. Arm.     Medications     IV infusion builder WITH additives 100 mL/hr at 02/18/18 2121     IV fluid REPLACEMENT ONLY         rantidine  150 mg Oral or Feeding Tube BID     carBAMazepine  150 mg Per J Tube Q6H     piperacillin-tazobactam  3.375 g Intravenous Q6H     insulin aspart  1-6 Units Subcutaneous Q4H     multivitamins with minerals  15 mL Per Feeding Tube Daily     hydrocortisone  10 mg Per J Tube QPM     cholecalciferol  2,000 Units Per J Tube Daily     Brivaracetam  100 mg Per J Tube BID     sodium chloride (PF)  10 mL Intracatheter Q8H     sodium chloride (PF)  10 mL Intracatheter Q8H      ipratropium - albuterol 0.5 mg/2.5 mg/3 mL  3 mL Nebulization 4x Daily     aspirin  81 mg Per J Tube Daily     fiber modular  1 packet Per Feeding Tube TID     hydrocortisone  20 mg Per J Tube QAM     levothyroxine  150 mcg Per J Tube Daily       Data     Recent Labs  Lab 02/18/18  0515 02/17/18  0530 02/16/18  1536 02/16/18  0515  02/14/18  1300 02/14/18  0350   WBC 7.3 6.6  --  7.6  < > 18.7* 14.8*   HGB 12.1* 11.7*  --  11.4*  < > 12.0* 16.0   MCV 90 92  --  93  < > 92 91    131*  --  96*  < > 111* 140*    142 143 145*  < > 142 138   POTASSIUM 3.6 3.7 3.7 3.6  < > 4.1 3.8   CHLORIDE 105 107  --  113*  < > 110* 100   CO2 26 30  --  28  < > 25 31   BUN 12 12  --  9  < > 16 20   CR 0.76 0.96  --  0.99  < > 1.16 1.09   ANIONGAP 7 5  --  4  < > 7 7   STEVE 7.9* 8.3*  --  8.5  < > 7.2* 8.8   * 176* 110* 133*  < > 95 116*   ALBUMIN  --   --   --   --   --  2.3* 3.3*   PROTTOTAL  --   --   --   --   --  5.8* 8.0   BILITOTAL  --   --   --   --   --  0.6 0.5   ALKPHOS  --   --   --   --   --  66 98   ALT  --   --   --   --   --  25 36   AST  --   --   --   --   --  18 33   < > = values in this interval not displayed.    No results found for this or any previous visit (from the past 24 hour(s)).

## 2018-02-18 NOTE — PROVIDER NOTIFICATION
MD Notification    Notified Person:  MD    Notified Persons Name: Dr. Garcia    Notification Date/Time: 2/18/18 1100 and 1450    Notification Interaction:  Talked with Physician    Purpose of Notification: Pt J tube is clogged. RN tried clot buster kit and coke. G tube flushes, J tube does not.    Orders Received: Ok to use G tube for meds. Hold Tube feed until J tube is replaced. WIll order IR to replace.     Comments:

## 2018-02-18 NOTE — PLAN OF CARE
Problem: Patient Care Overview  Goal: Plan of Care/Patient Progress Review  VSS. Tele SR. Denies pain. Turned repositioned q2h. Pt had loose BM x1. Lerma patent with good UO. TF continues @ 60ml/hr. Frequent cough, used yaunker x1. Pt refused further suctioning attempts. SaO2 92-97% on RA. Continue to monitor.

## 2018-02-18 NOTE — PLAN OF CARE
Problem: Patient Care Overview  Goal: Plan of Care/Patient Progress Review  Outcome: No Change  VSS. Pt denies pain. SR 60-70's. Nonverbal but can follow commands appropriately. R sided hemipelgia. Turned/repositioned q2hrs. Red blanchable coccyx. NPO. Tube feedings Stopped around 1000 due to flow error. J tube clogged. Tried clog zapper kit and coke with no success. G tube flushes and used for meds temporarily. Tube feed stopped. High risk for aspiration. New GJ tube exchange to happen tomorrow in IR. Pt mom is aware. Lerma patent w/ adequate UO, possible to DC tomorrow?. Intermittent cough w/ difficulty clearing mucus at times; suction at bedside. Continue w/ PT/OT w/ goal of returning home w/ assistance. Sat in chair today, up with lift. PT to try and pivot tomorrow. Continue to monitor.

## 2018-02-18 NOTE — PLAN OF CARE
Problem: Pneumonia (Adult)  Goal: Signs and Symptoms of Listed Potential Problems Will be Absent, Minimized or Managed (Pneumonia)  Signs and symptoms of listed potential problems will be absent, minimized or managed by discharge/transition of care (reference Pneumonia (Adult) CPG).   Outcome: No Change  VSS. Pt denies pain. SR 60-70's. Nonverbal but can follow commands appropriately. R sided hemipelgia. Turned/repositioned q2hrs. Red blanchable coccyx. NPO. Tube feedings maintained at 60 mL/hr through J-tube w/ G-tube clamped. Lerma patent w/ adequate UO. Intermittent cough w/ difficulty clearing mucus; suction at bedside. Continue w/ PT/OT w/ goal of returning home w/ assistance. Continue to monitor.

## 2018-02-18 NOTE — PROVIDER NOTIFICATION
2/18/18 7419 Spoke to PT regarding seeing pt today. PT is unable to see until Monday. RN expressed the mother's want to take pt home and not TCU. MD would like PT to see for possible DC Monday.

## 2018-02-19 ENCOUNTER — APPOINTMENT (OUTPATIENT)
Dept: PHYSICAL THERAPY | Facility: CLINIC | Age: 58
DRG: 871 | End: 2018-02-19
Payer: MEDICARE

## 2018-02-19 ENCOUNTER — APPOINTMENT (OUTPATIENT)
Dept: INTERVENTIONAL RADIOLOGY/VASCULAR | Facility: CLINIC | Age: 58
DRG: 871 | End: 2018-02-19
Attending: INTERNAL MEDICINE
Payer: MEDICARE

## 2018-02-19 LAB
ANION GAP SERPL CALCULATED.3IONS-SCNC: 7 MMOL/L (ref 3–14)
BASOPHILS # BLD AUTO: 0.1 10E9/L (ref 0–0.2)
BASOPHILS NFR BLD AUTO: 1.3 %
BUN SERPL-MCNC: 9 MG/DL (ref 7–30)
CALCIUM SERPL-MCNC: 7.2 MG/DL (ref 8.5–10.1)
CHLORIDE SERPL-SCNC: 97 MMOL/L (ref 94–109)
CO2 SERPL-SCNC: 24 MMOL/L (ref 20–32)
CREAT SERPL-MCNC: 0.72 MG/DL (ref 0.66–1.25)
DIFFERENTIAL METHOD BLD: ABNORMAL
EOSINOPHIL # BLD AUTO: 1 10E9/L (ref 0–0.7)
EOSINOPHIL NFR BLD AUTO: 12.3 %
ERYTHROCYTE [DISTWIDTH] IN BLOOD BY AUTOMATED COUNT: 12.7 % (ref 10–15)
GFR SERPL CREATININE-BSD FRML MDRD: >90 ML/MIN/1.7M2
GLUCOSE BLDC GLUCOMTR-MCNC: 101 MG/DL (ref 70–99)
GLUCOSE BLDC GLUCOMTR-MCNC: 103 MG/DL (ref 70–99)
GLUCOSE BLDC GLUCOMTR-MCNC: 109 MG/DL (ref 70–99)
GLUCOSE BLDC GLUCOMTR-MCNC: 112 MG/DL (ref 70–99)
GLUCOSE BLDC GLUCOMTR-MCNC: 116 MG/DL (ref 70–99)
GLUCOSE BLDC GLUCOMTR-MCNC: 132 MG/DL (ref 70–99)
GLUCOSE BLDC GLUCOMTR-MCNC: 139 MG/DL (ref 70–99)
GLUCOSE BLDC GLUCOMTR-MCNC: 153 MG/DL (ref 70–99)
GLUCOSE BLDC GLUCOMTR-MCNC: 97 MG/DL (ref 70–99)
GLUCOSE SERPL-MCNC: 432 MG/DL (ref 70–99)
HCT VFR BLD AUTO: 33.5 % (ref 40–53)
HGB BLD-MCNC: 11.5 G/DL (ref 13.3–17.7)
HGB BLD-MCNC: 14.3 G/DL (ref 13.3–17.7)
IMM GRANULOCYTES # BLD: 0.1 10E9/L (ref 0–0.4)
IMM GRANULOCYTES NFR BLD: 1.2 %
LYMPHOCYTES # BLD AUTO: 3.1 10E9/L (ref 0.8–5.3)
LYMPHOCYTES NFR BLD AUTO: 37 %
MAGNESIUM SERPL-MCNC: 1.7 MG/DL (ref 1.6–2.3)
MCH RBC QN AUTO: 30.5 PG (ref 26.5–33)
MCHC RBC AUTO-ENTMCNC: 34.3 G/DL (ref 31.5–36.5)
MCV RBC AUTO: 89 FL (ref 78–100)
MONOCYTES # BLD AUTO: 0.8 10E9/L (ref 0–1.3)
MONOCYTES NFR BLD AUTO: 9.9 %
NEUTROPHILS # BLD AUTO: 3.2 10E9/L (ref 1.6–8.3)
NEUTROPHILS NFR BLD AUTO: 38.3 %
NRBC # BLD AUTO: 0 10*3/UL
NRBC BLD AUTO-RTO: 0 /100
PHOSPHATE SERPL-MCNC: 2.8 MG/DL (ref 2.5–4.5)
PLATELET # BLD AUTO: 163 10E9/L (ref 150–450)
POTASSIUM SERPL-SCNC: 3.2 MMOL/L (ref 3.4–5.3)
POTASSIUM SERPL-SCNC: 4 MMOL/L (ref 3.4–5.3)
RBC # BLD AUTO: 3.77 10E12/L (ref 4.4–5.9)
SODIUM SERPL-SCNC: 128 MMOL/L (ref 133–144)
WBC # BLD AUTO: 8.4 10E9/L (ref 4–11)

## 2018-02-19 PROCEDURE — 94640 AIRWAY INHALATION TREATMENT: CPT

## 2018-02-19 PROCEDURE — C1769 GUIDE WIRE: HCPCS

## 2018-02-19 PROCEDURE — 85025 COMPLETE CBC W/AUTO DIFF WBC: CPT | Performed by: PHYSICIAN ASSISTANT

## 2018-02-19 PROCEDURE — 00000146 ZZHCL STATISTIC GLUCOSE BY METER IP

## 2018-02-19 PROCEDURE — 84100 ASSAY OF PHOSPHORUS: CPT | Performed by: PHYSICIAN ASSISTANT

## 2018-02-19 PROCEDURE — 85018 HEMOGLOBIN: CPT | Performed by: INTERNAL MEDICINE

## 2018-02-19 PROCEDURE — 25000125 ZZHC RX 250: Performed by: NURSE PRACTITIONER

## 2018-02-19 PROCEDURE — 40000275 ZZH STATISTIC RCP TIME EA 10 MIN

## 2018-02-19 PROCEDURE — 27210815 ZZ H TUBE GASTRO CR13

## 2018-02-19 PROCEDURE — 21000000 ZZH R&B IMCU HEART CARE

## 2018-02-19 PROCEDURE — A9270 NON-COVERED ITEM OR SERVICE: HCPCS | Mod: GY | Performed by: NURSE PRACTITIONER

## 2018-02-19 PROCEDURE — 83735 ASSAY OF MAGNESIUM: CPT | Performed by: PHYSICIAN ASSISTANT

## 2018-02-19 PROCEDURE — 80048 BASIC METABOLIC PNL TOTAL CA: CPT | Performed by: PHYSICIAN ASSISTANT

## 2018-02-19 PROCEDURE — 25800025 ZZH RX 258: Performed by: INTERNAL MEDICINE

## 2018-02-19 PROCEDURE — 94640 AIRWAY INHALATION TREATMENT: CPT | Mod: 76

## 2018-02-19 PROCEDURE — 25000128 H RX IP 250 OP 636: Performed by: HOSPITALIST

## 2018-02-19 PROCEDURE — 84132 ASSAY OF SERUM POTASSIUM: CPT | Performed by: INTERNAL MEDICINE

## 2018-02-19 PROCEDURE — 49452 REPLACE G-J TUBE PERC: CPT

## 2018-02-19 PROCEDURE — 27210742 ZZH CATH CR1

## 2018-02-19 PROCEDURE — 25000132 ZZH RX MED GY IP 250 OP 250 PS 637: Mod: GY | Performed by: ANESTHESIOLOGY

## 2018-02-19 PROCEDURE — 25000132 ZZH RX MED GY IP 250 OP 250 PS 637: Mod: GY | Performed by: NURSE PRACTITIONER

## 2018-02-19 PROCEDURE — 25000128 H RX IP 250 OP 636: Performed by: INTERNAL MEDICINE

## 2018-02-19 PROCEDURE — 0D2DXUZ CHANGE FEEDING DEVICE IN LOWER INTESTINAL TRACT, EXTERNAL APPROACH: ICD-10-PCS | Performed by: RADIOLOGY

## 2018-02-19 PROCEDURE — 99232 SBSQ HOSP IP/OBS MODERATE 35: CPT | Performed by: HOSPITALIST

## 2018-02-19 PROCEDURE — A9270 NON-COVERED ITEM OR SERVICE: HCPCS | Mod: GY | Performed by: ANESTHESIOLOGY

## 2018-02-19 PROCEDURE — 25000125 ZZHC RX 250: Performed by: HOSPITALIST

## 2018-02-19 PROCEDURE — 25000128 H RX IP 250 OP 636: Performed by: PHYSICIAN ASSISTANT

## 2018-02-19 PROCEDURE — 36415 COLL VENOUS BLD VENIPUNCTURE: CPT | Performed by: INTERNAL MEDICINE

## 2018-02-19 PROCEDURE — 40000886 ZZH STATISTIC STEP DOWN HRS DAY

## 2018-02-19 PROCEDURE — 25000125 ZZHC RX 250: Performed by: ANESTHESIOLOGY

## 2018-02-19 PROCEDURE — 40000193 ZZH STATISTIC PT WARD VISIT

## 2018-02-19 PROCEDURE — 97530 THERAPEUTIC ACTIVITIES: CPT | Mod: GP

## 2018-02-19 PROCEDURE — 40000239 ZZH STATISTIC VAT ROUNDS

## 2018-02-19 PROCEDURE — 99207 ZZC CDG-MDM COMPONENT: MEETS LOW - DOWN CODED: CPT | Performed by: HOSPITALIST

## 2018-02-19 PROCEDURE — 27210905 ZZH KIT CR7

## 2018-02-19 RX ORDER — LIDOCAINE HYDROCHLORIDE 10 MG/ML
INJECTION, SOLUTION INFILTRATION; PERINEURAL
Status: DISCONTINUED
Start: 2018-02-19 | End: 2018-02-19 | Stop reason: WASHOUT

## 2018-02-19 RX ADMIN — DEXTROSE MONOHYDRATE AND SODIUM CHLORIDE: 5; .45 INJECTION, SOLUTION INTRAVENOUS at 04:26

## 2018-02-19 RX ADMIN — RANITIDINE HYDROCHLORIDE 150 MG: 150 SOLUTION ORAL at 20:28

## 2018-02-19 RX ADMIN — IPRATROPIUM BROMIDE AND ALBUTEROL SULFATE 3 ML: .5; 3 SOLUTION RESPIRATORY (INHALATION) at 10:42

## 2018-02-19 RX ADMIN — BRIVARACETAM 100 MG: 10 SOLUTION ORAL at 23:26

## 2018-02-19 RX ADMIN — LEVOTHYROXINE SODIUM 150 MCG: 150 TABLET ORAL at 06:12

## 2018-02-19 RX ADMIN — ONDANSETRON 4 MG: 2 INJECTION INTRAMUSCULAR; INTRAVENOUS at 23:25

## 2018-02-19 RX ADMIN — TAZOBACTAM SODIUM AND PIPERACILLIN SODIUM 3.38 G: 375; 3 INJECTION, SOLUTION INTRAVENOUS at 11:46

## 2018-02-19 RX ADMIN — HYDROCORTISONE 10 MG: 10 TABLET ORAL at 18:08

## 2018-02-19 RX ADMIN — ONDANSETRON 4 MG: 2 INJECTION INTRAMUSCULAR; INTRAVENOUS at 17:52

## 2018-02-19 RX ADMIN — PANTOPRAZOLE SODIUM 40 MG: 40 INJECTION, POWDER, FOR SOLUTION INTRAVENOUS at 20:28

## 2018-02-19 RX ADMIN — CARBAMAZEPINE 150 MG: 100 SUSPENSION ORAL at 18:05

## 2018-02-19 RX ADMIN — POTASSIUM CHLORIDE 40 MEQ: 1.5 POWDER, FOR SOLUTION ORAL at 06:13

## 2018-02-19 RX ADMIN — Medication 1 PACKET: at 18:01

## 2018-02-19 RX ADMIN — HYDROCORTISONE 20 MG: 10 TABLET ORAL at 11:45

## 2018-02-19 RX ADMIN — TAZOBACTAM SODIUM AND PIPERACILLIN SODIUM 3.38 G: 375; 3 INJECTION, SOLUTION INTRAVENOUS at 16:18

## 2018-02-19 RX ADMIN — IPRATROPIUM BROMIDE AND ALBUTEROL SULFATE 3 ML: .5; 3 SOLUTION RESPIRATORY (INHALATION) at 15:13

## 2018-02-19 RX ADMIN — Medication 15 ML: at 11:14

## 2018-02-19 RX ADMIN — Medication 2 G: at 06:13

## 2018-02-19 RX ADMIN — IPRATROPIUM BROMIDE AND ALBUTEROL SULFATE 3 ML: .5; 3 SOLUTION RESPIRATORY (INHALATION) at 19:29

## 2018-02-19 RX ADMIN — CARBAMAZEPINE 150 MG: 100 SUSPENSION ORAL at 11:46

## 2018-02-19 RX ADMIN — TAZOBACTAM SODIUM AND PIPERACILLIN SODIUM 3.38 G: 375; 3 INJECTION, SOLUTION INTRAVENOUS at 23:30

## 2018-02-19 RX ADMIN — IPRATROPIUM BROMIDE AND ALBUTEROL SULFATE 3 ML: .5; 3 SOLUTION RESPIRATORY (INHALATION) at 07:19

## 2018-02-19 RX ADMIN — ALTEPLASE 4 MG: 2.2 INJECTION, POWDER, LYOPHILIZED, FOR SOLUTION INTRAVENOUS at 22:31

## 2018-02-19 RX ADMIN — TAZOBACTAM SODIUM AND PIPERACILLIN SODIUM 3.38 G: 375; 3 INJECTION, SOLUTION INTRAVENOUS at 04:24

## 2018-02-19 RX ADMIN — RANITIDINE HYDROCHLORIDE 150 MG: 150 SOLUTION ORAL at 11:46

## 2018-02-19 RX ADMIN — BRIVARACETAM 100 MG: 10 SOLUTION ORAL at 11:13

## 2018-02-19 RX ADMIN — POTASSIUM CHLORIDE 20 MEQ: 1.5 POWDER, FOR SOLUTION ORAL at 11:11

## 2018-02-19 RX ADMIN — CARBAMAZEPINE 150 MG: 100 SUSPENSION ORAL at 01:20

## 2018-02-19 RX ADMIN — CHOLECALCIFEROL TAB 25 MCG (1000 UNIT) 2000 UNITS: 25 TAB at 11:45

## 2018-02-19 RX ADMIN — INSULIN ASPART 1 UNITS: 100 INJECTION, SOLUTION INTRAVENOUS; SUBCUTANEOUS at 01:21

## 2018-02-19 RX ADMIN — CARBAMAZEPINE 150 MG: 100 SUSPENSION ORAL at 06:12

## 2018-02-19 RX ADMIN — ASPIRIN 81 MG 81 MG: 81 TABLET ORAL at 11:44

## 2018-02-19 NOTE — PLAN OF CARE
Problem: Patient Care Overview  Goal: Plan of Care/Patient Progress Review  Discharge Planner PT   Patient plan for discharge: Per mother, would like to return home with HHPT   Current status: Supine to sit with HOB elevated and mod A x 2. Poor seated balance EOB (CGA to mod A x 2). Stand-pivot transfer to chair with max A x 2.   Barriers to return to prior living situation: none  Recommendations for discharge:  Appropriate to return home with prior level of assist (if family can consistently provide Ax2) and resumption of HHPT  Rationale for recommendations: Pt reports moving is more difficult than usual. Anticipate being in a familiar environment with familiar caregivers makes a big difference on transfer ability. Per chart, family also has a nidia lift, although they prefer to not use it with the patient. Pt would benefit from continued HHPT to progress functional strength and to increase independence with transfers.        Entered by: Jenny Greco 02/19/2018 10:14 AM

## 2018-02-19 NOTE — PROGRESS NOTES
Patient tolerated procedure well. New 18F GJ tube placed, flushed with water and checked with fluoro. No sedation given. Dressing clean, dry and intact. Patient back to floor via cart. Report given to Yasmin MCELROY RN.    Corinna Medley RN

## 2018-02-19 NOTE — PLAN OF CARE
Problem: Patient Care Overview  Goal: Plan of Care/Patient Progress Review  Outcome: No Change  Pt has communicated by shaking his head that he has no pain with V/sign stable and maintaining his O2 sats in the mid 90's on Rm air Tube feeding was exchanged this morning and Tube feedings were resumed at original dose 60 cc/ hour with 60 cc of free flush every 4 hours. No stools during the day. Pt did work with PT this morning and was assisted of 2 with lift to the chair. I shared with Pt's mom that patient was very weak and need lift to the chair this morning. Pt had spontaneously emesis when medication were being given per ISA. Pt's mom told me he does that at home also. K+ was replaced and K+ check due at 16:00.  Remains in NSR

## 2018-02-19 NOTE — PLAN OF CARE
Problem: Patient Care Overview  Goal: Plan of Care/Patient Progress Review  Outcome: No Change  Pt alert and communicates nonverbally when spoken to. VSS, RA, SR 60-70's. NPO with D5 1/2NS 100mL/hr d/t clogged GJ tube, meds administered through J tube. GJ exchange in IR in the AM. Lerma patent w/ good output. Cough with trouble clearing mucus, suction at bedside, but pt resistant to use. Discharge pending PT work to return to baseline stand/pivot. Continue to monitor.

## 2018-02-19 NOTE — PROGRESS NOTES
[facility]    Hospitalist Progress Note  Name: Keyon Farias    MRN: [id number]  Provider:  Keyon Mac DO, MPH  Date of Service: 02/19/2018    Summary of Stay: Keyon Farias is a pleasant 57 year old gentlema who was admitted 2/14/2018 with sepsis and acute hypoxemic respiratory failure in the setting of RLL pneumonia. He was initially in the ICU due to hypotension, but quickly improved with levophed, fluids and antibiotics and was transferred to the floor on 2/16 for further care.  Current problems include:      Sepsis due to right lower lobe pneumonia; improving.  - change zosyn to augmentin via FT.  - concern regarding risk aspiration - ongoing.  - appreciate evaluation by SLT: continue palliative feedings despite aspiration risk; continue NPO      Traumatic brain injury with aphasia, dysphasia, and increased aspiration risk; stable.  Unable to continue w/ J-port TF yesterday as RN's have been unable to unclog Keyon's J-port, in spite of usual attempts.  - IR relieved obstruction today  - Stop IVF  - Resume TF    Hypernatremia; Na drop from 138 to 128, likely related to NPO status and hypotonic IVF.  - Stop IVF  - Resume TF  - AM BMP      Chronic medical conditions; stable.     Seizure disorder thought due to traumatic brain injury; stable.  - continue prior to admission Tegretol; have restarted Brevaracitam     Panhypopituitarism; stable.  - continue hydrocortisone 20 mg Q a.m. And 10 mg Q p.m.     Anemia; Hgb stable.    # Pain Assessment:   Current Pain Score 2/19/2018 2/19/2018 2/18/2018   Patient currently in pain? HECTOR denies denies   Pain score (0-10) - - -   Pain location - - -   Pain descriptors - - -   rFLACC pain score - - -   CPOT pain score - - -   Keyon liriano pain level was assessed and he currently denies pain.      DVT Prophylaxis: Pneumatic Compression Devices  Code Status: Full Code  Disposition: Expected discharge in 1 days to home. Goals prior to discharge include monitor Na, restart TF.  "  Family updated today: Yes, updated mother by phone.    Addendum: Informed that patient had \"coffee ground emesis\". Stop TF, continue NPO, d/c ASA, serial Hb. Hold off on GI consult for now but consider pending course overnight.     Interval History   Assumed care from previous hospitalist. The history was fully reviewed.  Patient non verbal but appears well. Had FT unclogged this AM by IR.    -Data reviewed today: I reviewed all new labs and imaging results over the last 24 hours.     Physical Exam   Temp: 97.9  F (36.6  C) Temp src: Oral BP: 130/81   Heart Rate: 67 Resp: 14 SpO2: 97 % O2 Device: Nasal cannula    Vitals:    02/16/18 0400 02/17/18 0700 02/18/18 0500   Weight: 73 kg (160 lb 15 oz) 76 kg (167 lb 8.8 oz) 76 kg (167 lb 8.8 oz)     Vital Signs with Ranges  Temp:  [97  F (36.1  C)-97.9  F (36.6  C)] 97.9  F (36.6  C)  Heart Rate:  [] 67  Resp:  [11-20] 14  BP: (108-142)/(68-87) 130/81  SpO2:  [94 %-100 %] 97 %  I/O last 3 completed shifts:  In: 2123.33 [I.V.:1273.33; NG/GT:140]  Out: 950 [Urine:950]    GENERAL: No apparent distress. Awake, alert, but non verbal.  HEENT: Normocephalic, atraumatic. Extraocular movements intact.  CARDIOVASCULAR: Regular rate and rhythm without murmurs or rubs. No S3.  PULMONARY: Clear bilaterally.  GASTROINTESTINAL: Soft, non-tender, non-distended. Bowel sounds normoactive.   EXTREMITIES: No cyanosis or clubbing. No edema.  NEUROLOGICAL: Rt arm flaccid. No other focal complaints.   DERMATOLOGICAL: No rash, ulcer, bruising, nor jaundice. Atrophic lower extremities.    Medications     IV fluid REPLACEMENT ONLY         rantidine  150 mg Oral or Feeding Tube BID     carBAMazepine  150 mg Per J Tube Q6H     piperacillin-tazobactam  3.375 g Intravenous Q6H     insulin aspart  1-6 Units Subcutaneous Q4H     multivitamins with minerals  15 mL Per Feeding Tube Daily     hydrocortisone  10 mg Per J Tube QPM     cholecalciferol  2,000 Units Per J Tube Daily     Brivaracetam  100 " mg Per J Tube BID     sodium chloride (PF)  10 mL Intracatheter Q8H     sodium chloride (PF)  10 mL Intracatheter Q8H     ipratropium - albuterol 0.5 mg/2.5 mg/3 mL  3 mL Nebulization 4x Daily     aspirin  81 mg Per J Tube Daily     fiber modular  1 packet Per Feeding Tube TID     hydrocortisone  20 mg Per J Tube QAM     levothyroxine  150 mcg Per J Tube Daily     Data     Laboratory:    Recent Labs  Lab 02/19/18  0440 02/18/18  0515 02/17/18  0530   WBC 8.4 7.3 6.6   HGB 11.5* 12.1* 11.7*   HCT 33.5* 35.5* 35.4*   MCV 89 90 92    159 131*       Recent Labs  Lab 02/19/18  0440 02/18/18  0515 02/17/18  0530   * 138 142   POTASSIUM 3.2* 3.6 3.7   CHLORIDE 97 105 107   CO2 24 26 30   ANIONGAP 7 7 5   * 120* 176*   BUN 9 12 12   CR 0.72 0.76 0.96   GFRESTIMATED >90 >90 82   GFRESTBLACK >90 >90 >90   STEVE 7.2* 7.9* 8.3*       Recent Labs  Lab 02/15/18  0020 02/14/18  0854 02/14/18  0450 02/14/18  0350   CULT Light growthMethicillin resistant Staphylococcus aureus (MRSA)This isolate is presumed to be clindamycin resistant based on detection of inducible clindamycin resistance. Erythromycin and clindamycin are resistant, therefore, they are not recommended for use.* No growth No growth after 5 days No growth after 5 days       Imaging:  Recent Results (from the past 24 hour(s))   IR Gastro Jejunostomy Tube Change    Narrative    INTERVENTIONAL RADIOLOGY GASTRO JEJUNOSTOMY TUBE CHANGE  2/19/2018  8:32 AM    HISTORY:  Clogged gastrojejunostomy feeding tube.    FLUOROSCOPY TIME: 1.4 minutes    TOTAL FLUOROSCOPY DOSE: 37 mGy (air kerma)    TECHNIQUE:  Informed consent was obtained from the patient's family. A  timeout was performed.  The indwelling gastrojejunostomy tube and  surrounding skin were prepped and draped in a sterile manner. Contrast  could not be injected through the jejunal limb because it was  completely clogged, so the retention balloon was deflated and the tube  was removed. A 5 Divehi Kumpe  catheter was used to regain access from  the access site to the jejunum. Over a Glidewire, a new 18 Serbian  gastrojejunostomy feeding tube was placed. The balloon was inflated  with 10 mL of dilute contrast. A final image was obtained.    FINDINGS:  Final fluoroscopic image demonstrates appropriate  positioning of the gastrojejunostomy tube with the tip beyond the  ligament of Treitz.      Impression    IMPRESSION:  Replacement of a clogged feeding tube.         Keyon Mac DO MPH  [facility] Hospitalist  [facility] [place] [address]  Pager: [phone]  02/19/2018

## 2018-02-19 NOTE — PROCEDURES
RADIOLOGY PROCEDURE NOTE  Patient name: Keyon Farias  MRN: [id number]  : 1960    Pre-procedure diagnosis: clogged GJ tube  Post-procedure diagnosis: Same    Procedure Date/Time: 2018  8:26 AM  Procedure: GJ replacement  Estimated blood loss: None  Specimen(s) collected with description: none  The patient tolerated the procedure well with no immediate complications.  Significant findings:none    See imaging dictation for procedural details.    Provider name: Yoel Stevenson  Assistant(s):None

## 2018-02-19 NOTE — PROGRESS NOTES
CLINICAL NUTRITION SERVICES - REASSESSMENT NOTE      Recommendations Ordered by Registered Dietitian (RD):     Will increase water flushes to 150 mL every 4 hrs (900 mL)  TOTAL (TF + flushes) = 1900 mL/day     Malnutrition: (2/15)  % Weight Loss:  Weight loss does not meet criteria for malnutrition- 2% wt loss in 2 months.   % Intake:  Suspect no decreased intake noted- pt on home TF regimen.  Subcutaneous Fat Loss:  None observed  Muscle Loss:  None observed  Fluid Retention:  None noted     Malnutrition Diagnosis: Patient does not meet two of the above criteria necessary for diagnosing malnutrition       EVALUATION OF PROGRESS TOWARD GOALS   Diet:  NPO      Nutrition Support Enteral:  Type of Feeding Tube: New 18F GJ tube placed  Enteral Frequency:  Continuous  Enteral Regimen: Isosource 1.5 @ 60 mL/hr (x22 hrs - TF held for synthroid administration)  Total Enteral Provisions: 1980 cals (26 cals/kg), 90 gm pro (1.2 gm/kg), 20 gm fiber, 1000 mL free water  Free Water Flush: 60 mL every 4 hrs  Nutrisource Fiber 1 packet TID = 45 cals, 9 gm fiber  TOTAL: 2025 cals (27 cals/kg), 29 gm fiber    +1 BM past 24 hrs    BGM: 153, 432, 112  (Medium SSI ordered)    J-port was clogged - TF on hold starting yesterday morning - has now been replaced and TF resumed    Daily multivitamin       Dosing Weight 74.8 kg (2/14 - admit)      ASSESSED NUTRITION NEEDS PER APPROVED PRACTICE GUIDELINES:  Estimated Energy Needs: 6264-0241 kcals (25-30 Kcal/Kg)  Justification: Increased needs   Estimated Protein Needs:  grams protein (1.2-1.5 g pro/Kg)  Justification: preservation of lean body mass  Estimated Fluid Needs: 2013-6012  mL (1 mL/Kcal)  Justification: per provider pending fluid status         NEW FINDINGS:   2/19: New 18F GJ tube placed    Vitals:    02/14/18 0430 02/15/18 0400 02/15/18 1400 02/16/18 0400   Weight: 74.8 kg (165 lb) 74.7 kg (164 lb 10.9 oz) 71.7 kg (158 lb 1.1 oz) 73 kg (160 lb 15 oz)    02/17/18 0700 02/18/18  0500   Weight: 76 kg (167 lb 8.8 oz) 76 kg (167 lb 8.8 oz)     Labs: Na 128 (H)            K 3.2 (L)            Mg 1.7            Phos 2.8      Previous Goals (2/15):   Isosource 1.5 @ 60 ml/hr (x 22 hr/day) to meet % of estimated needs.   Evaluation: Met    BG levels will be monitored to be less than 150 mg/dL.   Evaluation: Not met    Previous Nutrition Diagnosis (2/15):   Inadequate enteral nutrition infusion related to TF on hold since admit as evidenced by 0% of needs met.   Evaluation: Improving          CURRENT NUTRITION DIAGNOSIS  No nutrition diagnosis identified at this time       INTERVENTIONS  Recommendations / Nutrition Prescription  NPO  Continue TF as above    Will increase water flushes to 150 mL every 4 hrs (900 mL)  TOTAL (TF + flushes) = 1900 mL/day    Implementation  Updated water flushes in EPIC    Goals  EN of Isosource 1.5 @ 60 mL/hr x 22 hrs to meet % est needs      MONITORING AND EVALUATION:  Progress towards goals will be monitored and evaluated per protocol and Practice Guidelines

## 2018-02-20 ENCOUNTER — APPOINTMENT (OUTPATIENT)
Dept: PHYSICAL THERAPY | Facility: CLINIC | Age: 58
DRG: 871 | End: 2018-02-20
Payer: MEDICARE

## 2018-02-20 LAB
ANION GAP SERPL CALCULATED.3IONS-SCNC: 6 MMOL/L (ref 3–14)
BACTERIA SPEC CULT: NO GROWTH
BACTERIA SPEC CULT: NO GROWTH
BASOPHILS # BLD AUTO: 0.1 10E9/L (ref 0–0.2)
BASOPHILS NFR BLD AUTO: 1 %
BUN SERPL-MCNC: 10 MG/DL (ref 7–30)
CALCIUM SERPL-MCNC: 8.4 MG/DL (ref 8.5–10.1)
CHLORIDE SERPL-SCNC: 97 MMOL/L (ref 94–109)
CO2 SERPL-SCNC: 27 MMOL/L (ref 20–32)
CREAT SERPL-MCNC: 0.98 MG/DL (ref 0.66–1.25)
DIFFERENTIAL METHOD BLD: ABNORMAL
EOSINOPHIL # BLD AUTO: 1.1 10E9/L (ref 0–0.7)
EOSINOPHIL NFR BLD AUTO: 9.8 %
ERYTHROCYTE [DISTWIDTH] IN BLOOD BY AUTOMATED COUNT: 12.8 % (ref 10–15)
GFR SERPL CREATININE-BSD FRML MDRD: 79 ML/MIN/1.7M2
GLUCOSE BLDC GLUCOMTR-MCNC: 105 MG/DL (ref 70–99)
GLUCOSE BLDC GLUCOMTR-MCNC: 132 MG/DL (ref 70–99)
GLUCOSE SERPL-MCNC: 77 MG/DL (ref 70–99)
HCT VFR BLD AUTO: 38.8 % (ref 40–53)
HGB BLD-MCNC: 13.5 G/DL (ref 13.3–17.7)
IMM GRANULOCYTES # BLD: 0.2 10E9/L (ref 0–0.4)
IMM GRANULOCYTES NFR BLD: 1.5 %
LYMPHOCYTES # BLD AUTO: 3.5 10E9/L (ref 0.8–5.3)
LYMPHOCYTES NFR BLD AUTO: 31.8 %
Lab: NORMAL
Lab: NORMAL
MAGNESIUM SERPL-MCNC: 2.4 MG/DL (ref 1.6–2.3)
MCH RBC QN AUTO: 30.8 PG (ref 26.5–33)
MCHC RBC AUTO-ENTMCNC: 34.8 G/DL (ref 31.5–36.5)
MCV RBC AUTO: 88 FL (ref 78–100)
MONOCYTES # BLD AUTO: 1.2 10E9/L (ref 0–1.3)
MONOCYTES NFR BLD AUTO: 11.1 %
NEUTROPHILS # BLD AUTO: 4.9 10E9/L (ref 1.6–8.3)
NEUTROPHILS NFR BLD AUTO: 44.8 %
NRBC # BLD AUTO: 0 10*3/UL
NRBC BLD AUTO-RTO: 0 /100
PLATELET # BLD AUTO: 219 10E9/L (ref 150–450)
POTASSIUM SERPL-SCNC: 4 MMOL/L (ref 3.4–5.3)
RBC # BLD AUTO: 4.39 10E12/L (ref 4.4–5.9)
SODIUM SERPL-SCNC: 130 MMOL/L (ref 133–144)
SPECIMEN SOURCE: NORMAL
SPECIMEN SOURCE: NORMAL
WBC # BLD AUTO: 11 10E9/L (ref 4–11)

## 2018-02-20 PROCEDURE — 00000146 ZZHCL STATISTIC GLUCOSE BY METER IP

## 2018-02-20 PROCEDURE — 94640 AIRWAY INHALATION TREATMENT: CPT

## 2018-02-20 PROCEDURE — 12000000 ZZH R&B MED SURG/OB

## 2018-02-20 PROCEDURE — 25000128 H RX IP 250 OP 636: Performed by: PHYSICIAN ASSISTANT

## 2018-02-20 PROCEDURE — 25000125 ZZHC RX 250: Performed by: NURSE PRACTITIONER

## 2018-02-20 PROCEDURE — 40000239 ZZH STATISTIC VAT ROUNDS

## 2018-02-20 PROCEDURE — 99232 SBSQ HOSP IP/OBS MODERATE 35: CPT | Performed by: INTERNAL MEDICINE

## 2018-02-20 PROCEDURE — 25000128 H RX IP 250 OP 636: Performed by: ANESTHESIOLOGY

## 2018-02-20 PROCEDURE — 80048 BASIC METABOLIC PNL TOTAL CA: CPT | Performed by: PHYSICIAN ASSISTANT

## 2018-02-20 PROCEDURE — 40000275 ZZH STATISTIC RCP TIME EA 10 MIN

## 2018-02-20 PROCEDURE — 85025 COMPLETE CBC W/AUTO DIFF WBC: CPT | Performed by: PHYSICIAN ASSISTANT

## 2018-02-20 PROCEDURE — 25000125 ZZHC RX 250: Performed by: HOSPITALIST

## 2018-02-20 PROCEDURE — 25000132 ZZH RX MED GY IP 250 OP 250 PS 637: Mod: GY | Performed by: NURSE PRACTITIONER

## 2018-02-20 PROCEDURE — 97530 THERAPEUTIC ACTIVITIES: CPT | Mod: GP

## 2018-02-20 PROCEDURE — 25000132 ZZH RX MED GY IP 250 OP 250 PS 637: Mod: GY | Performed by: ANESTHESIOLOGY

## 2018-02-20 PROCEDURE — A9270 NON-COVERED ITEM OR SERVICE: HCPCS | Mod: GY | Performed by: NURSE PRACTITIONER

## 2018-02-20 PROCEDURE — 40000193 ZZH STATISTIC PT WARD VISIT

## 2018-02-20 PROCEDURE — A9270 NON-COVERED ITEM OR SERVICE: HCPCS | Mod: GY | Performed by: ANESTHESIOLOGY

## 2018-02-20 PROCEDURE — 94640 AIRWAY INHALATION TREATMENT: CPT | Mod: 76

## 2018-02-20 PROCEDURE — 83735 ASSAY OF MAGNESIUM: CPT | Performed by: PHYSICIAN ASSISTANT

## 2018-02-20 RX ADMIN — CARBAMAZEPINE 150 MG: 100 SUSPENSION ORAL at 06:11

## 2018-02-20 RX ADMIN — POTASSIUM CHLORIDE 20 MEQ: 400 INJECTION, SOLUTION INTRAVENOUS at 10:21

## 2018-02-20 RX ADMIN — CARBAMAZEPINE 150 MG: 100 SUSPENSION ORAL at 00:30

## 2018-02-20 RX ADMIN — PANTOPRAZOLE SODIUM 40 MG: 40 INJECTION, POWDER, FOR SOLUTION INTRAVENOUS at 10:15

## 2018-02-20 RX ADMIN — PANTOPRAZOLE SODIUM 40 MG: 40 INJECTION, POWDER, FOR SOLUTION INTRAVENOUS at 21:06

## 2018-02-20 RX ADMIN — HYDROCORTISONE 10 MG: 10 TABLET ORAL at 21:06

## 2018-02-20 RX ADMIN — BRIVARACETAM 100 MG: 10 SOLUTION ORAL at 10:40

## 2018-02-20 RX ADMIN — POTASSIUM CHLORIDE 20 MEQ: 400 INJECTION, SOLUTION INTRAVENOUS at 00:45

## 2018-02-20 RX ADMIN — TAZOBACTAM SODIUM AND PIPERACILLIN SODIUM 3.38 G: 375; 3 INJECTION, SOLUTION INTRAVENOUS at 05:59

## 2018-02-20 RX ADMIN — IPRATROPIUM BROMIDE AND ALBUTEROL SULFATE 3 ML: .5; 3 SOLUTION RESPIRATORY (INHALATION) at 11:48

## 2018-02-20 RX ADMIN — HYDROCORTISONE 20 MG: 10 TABLET ORAL at 10:17

## 2018-02-20 RX ADMIN — Medication 1 PACKET: at 22:42

## 2018-02-20 RX ADMIN — CARBAMAZEPINE 150 MG: 100 SUSPENSION ORAL at 13:47

## 2018-02-20 RX ADMIN — TAZOBACTAM SODIUM AND PIPERACILLIN SODIUM 3.38 G: 375; 3 INJECTION, SOLUTION INTRAVENOUS at 13:48

## 2018-02-20 RX ADMIN — IPRATROPIUM BROMIDE AND ALBUTEROL SULFATE 3 ML: .5; 3 SOLUTION RESPIRATORY (INHALATION) at 07:26

## 2018-02-20 RX ADMIN — Medication 1 PACKET: at 18:39

## 2018-02-20 RX ADMIN — BRIVARACETAM 100 MG: 10 SOLUTION ORAL at 22:42

## 2018-02-20 RX ADMIN — CARBAMAZEPINE 150 MG: 100 SUSPENSION ORAL at 18:39

## 2018-02-20 RX ADMIN — IPRATROPIUM BROMIDE AND ALBUTEROL SULFATE 3 ML: .5; 3 SOLUTION RESPIRATORY (INHALATION) at 15:10

## 2018-02-20 RX ADMIN — LEVOTHYROXINE SODIUM 150 MCG: 150 TABLET ORAL at 06:11

## 2018-02-20 RX ADMIN — TAZOBACTAM SODIUM AND PIPERACILLIN SODIUM 3.38 G: 375; 3 INJECTION, SOLUTION INTRAVENOUS at 18:32

## 2018-02-20 RX ADMIN — IPRATROPIUM BROMIDE AND ALBUTEROL SULFATE 3 ML: .5; 3 SOLUTION RESPIRATORY (INHALATION) at 19:48

## 2018-02-20 RX ADMIN — Medication 15 ML: at 10:20

## 2018-02-20 RX ADMIN — CHOLECALCIFEROL TAB 25 MCG (1000 UNIT) 2000 UNITS: 25 TAB at 10:16

## 2018-02-20 NOTE — PLAN OF CARE
Problem: Patient Care Overview  Goal: Plan of Care/Patient Progress Review  Outcome: No Change  Pt has appeared comfortable today with V/signs stable and maintaining his O2 sats in the mid 90's on Rm air. Ax temps 98.6 X 2. Tube feedings remain off and Pt has had no emesis today after Protonix was started yesterday. Zantac was DC per Dr. Irizarry when she was making rounds this afternoon. Pt 's mother was updated this morning per phone and when she came to visit around 13:30. PT did work with Pt and placed him in the chair with assist of 2 and lift. Pt very weak per PT person. Chair alarm on.  Pic- line flushes well. Am K+ 4.0  and replacement was given ( orders are to be over 4.0) with recheck in the morning. Remains in NSR

## 2018-02-20 NOTE — PROGRESS NOTES
[facility]    Hospitalist Progress Note      Assessment & Plan   Keyon Farias is a pleasant 57 year old gentlema who was admitted 2/14/2018 with sepsis and acute hypoxemic respiratory failure in the setting of RLL pneumonia. He was initially in the ICU due to hypotension, but quickly improved with levophed, fluids and antibiotics and was transferred to the floor on 2/16 for further care. Current problems include:      Sepsis due to right lower lobe pneumonia; improving.  - continue with Zosyn today  - concern regarding risk aspiration - ongoing.  - appreciate evaluation by SLT: recommend continued NPO      Traumatic brain injury with aphasia, dysphasia, and increased aspiration risk; stable.    Unable to continue w/ J-port TF yesterday as RN's have been unable to unclog Keyon's J-port, in spite of usual attempts s/p GJ tube replacement on 2/19  - restart TF today and advance as tolerated. which was held due to emesis yesterday  - Resume TF     Hyponatremia; Na drop from 138 to 128, likely related to NPO status and hypotonic IVF.  - Resume TF as above today  - Na at 130 today  - AM BMP ordered      Chronic medical conditions; stable.      Seizure disorder thought due to traumatic brain injury; stable.  - continue prior to admission Tegretol; have restarted Brevaracitam      Panhypopituitarism; stable.  - continue hydrocortisone 20 mg Q a.m. And 10 mg Q p.m.      Anemia; Hgb stable.  # Pain Assessment  Current Pain Score 2/20/2018 2/20/2018 2/20/2018   Patient currently in pain? denies denies denies   Pain score (0-10) - - -   Pain location - - -   Pain descriptors - - -   rFLACC pain score - - -   CPOT pain score - - -   Keyon liriano pain level was assessed and he currently denies pain.      DVT Prophylaxis: Anti-embolisim stockings (TEDs)  Code Status: Full Code    Disposition: Expected discharge in 1-2 day if tolerated feeding tube    Yogi Irizarry    Interval History   Denies pain and responds by  nodding or showing thumbs up.    Breathing is ok. Denies vomiting.   TF still on hold since yesterday.     -Data reviewed today: I reviewed all new labs and imaging results over the last 24 hours. I personally reviewed no images or EKG's today.    Physical Exam   Temp: 98.5  F (36.9  C) Temp src: Oral BP: 105/71   Heart Rate: 80 Resp: 16 SpO2: 98 % O2 Device: None (Room air)    Vitals:    02/17/18 0700 02/18/18 0500 02/20/18 0600   Weight: 76 kg (167 lb 8.8 oz) 76 kg (167 lb 8.8 oz) 72.4 kg (159 lb 11.2 oz)     Vital Signs with Ranges  Temp:  [98.1  F (36.7  C)-98.5  F (36.9  C)] 98.5  F (36.9  C)  Heart Rate:  [70-90] 80  Resp:  [12-36] 16  BP: (105-128)/(65-80) 105/71  SpO2:  [94 %-99 %] 98 %  I/O last 3 completed shifts:  In: 674 [I.V.:229; NG/GT:445]  Out: 1850 [Urine:1450; Emesis/NG output:400]    Constitutional: Alert, awake and no apparent distress  Respiratory: CTA bilaterally  Cardiovascular: regular rate and rhythm  GI: soft and non-tender  Skin/Integumen: right arm flaccid/contracted      Medications     IV fluid REPLACEMENT ONLY         pantoprazole (PROTONIX) IV  40 mg Intravenous BID     carBAMazepine  150 mg Per J Tube Q6H     piperacillin-tazobactam  3.375 g Intravenous Q6H     insulin aspart  1-6 Units Subcutaneous Q4H     multivitamins with minerals  15 mL Per Feeding Tube Daily     hydrocortisone  10 mg Per J Tube QPM     cholecalciferol  2,000 Units Per J Tube Daily     Brivaracetam  100 mg Per J Tube BID     sodium chloride (PF)  10 mL Intracatheter Q8H     sodium chloride (PF)  10 mL Intracatheter Q8H     ipratropium - albuterol 0.5 mg/2.5 mg/3 mL  3 mL Nebulization 4x Daily     fiber modular  1 packet Per Feeding Tube TID     hydrocortisone  20 mg Per J Tube QAM     levothyroxine  150 mcg Per J Tube Daily       Data     Recent Labs  Lab 02/20/18  0558 02/19/18  2305 02/19/18  0440 02/18/18  0515  02/14/18  1300 02/14/18  0350   WBC 11.0  --  8.4 7.3  < > 18.7* 14.8*   HGB 13.5 14.3 11.5* 12.1*   < > 12.0* 16.0   MCV 88  --  89 90  < > 92 91     --  163 159  < > 111* 140*   *  --  128* 138  < > 142 138   POTASSIUM 4.0 4.0 3.2* 3.6  < > 4.1 3.8   CHLORIDE 97  --  97 105  < > 110* 100   CO2 27  --  24 26  < > 25 31   BUN 10  --  9 12  < > 16 20   CR 0.98  --  0.72 0.76  < > 1.16 1.09   ANIONGAP 6  --  7 7  < > 7 7   STEVE 8.4*  --  7.2* 7.9*  < > 7.2* 8.8   GLC 77  --  432* 120*  < > 95 116*   ALBUMIN  --   --   --   --   --  2.3* 3.3*   PROTTOTAL  --   --   --   --   --  5.8* 8.0   BILITOTAL  --   --   --   --   --  0.6 0.5   ALKPHOS  --   --   --   --   --  66 98   ALT  --   --   --   --   --  25 36   AST  --   --   --   --   --  18 33   < > = values in this interval not displayed.    No results found for this or any previous visit (from the past 24 hour(s)).

## 2018-02-20 NOTE — PROGRESS NOTES
Patient is on room air and SpO2 mid to high 90`s. Neb tx given as scheduled. BBS coarse/crackles. Congested cough. Will continue to follow.
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
## 2018-02-20 NOTE — PLAN OF CARE
Problem: Pneumonia (Adult)  Goal: Signs and Symptoms of Listed Potential Problems Will be Absent, Minimized or Managed (Pneumonia)  Signs and symptoms of listed potential problems will be absent, minimized or managed by discharge/transition of care (reference Pneumonia (Adult) CPG).   VSS, alert, can respond to questions with one word answers.  Answers no to pain question.  No emesis or stools overnight--pt has slept well.  Productive cough, can occasionally suction out with yankaur.  Urine output adequate per aaron.  PICC line now working after alteplase.  J-G tube site intact.  Tele is SR.

## 2018-02-20 NOTE — PROVIDER NOTIFICATION
Sent page to MD.  Talked to Dr. Maldonado- stated patient had another coffee ground emesis.  Mother didn't want writer to give G0tube seizure meds until writer spoke to doctor- Mother was nervous patient would throw it up.    Dr. Maldonado stated ok to give seizure med after giving another dose of zofran.

## 2018-02-20 NOTE — PLAN OF CARE
Problem: Patient Care Overview  Goal: Plan of Care/Patient Progress Review  Discharge Planner PT   Patient plan for discharge: Per mother, would like to return home with HHPT   Current status: BP supine 97/72, O2 on RA 93%, HR 82. Supine to sit with HOB elevated, use of bed rail and mod assist x 2 at trunk. Pt unable to maintain sitting balance EOB today without mod/max assist x 1-2 therefore utilized ceiling lift to transfer from EOB to chair at bedside for safety. Pt sitting up with all needs in reach and chair alarm on upon PT exit. RN aware.   Barriers to return to prior living situation: currently requiring lift for transfers and pt's mother states pt is usually Ax1-2 at home and they don't use a lift  Recommendations for discharge: Per the plan established by the Physical Therapist, anticipate return home with prior level of assist if family can consistently provide Ax2 and resumption of HHPT. However, if pt still requiring lift for transfer, pt will likely need TCU.  Rationale for recommendations: Pt reports moving is more difficult than usual. Anticipate being in a familiar environment with familiar caregivers makes a big difference on transfer ability. Pt would benefit from continued HHPT to progress functional strength and to increase independence with transfers.       Entered by: Jody Scott 02/20/2018 2:26 PM

## 2018-02-21 VITALS
BODY MASS INDEX: 22.45 KG/M2 | HEART RATE: 64 BPM | SYSTOLIC BLOOD PRESSURE: 107 MMHG | RESPIRATION RATE: 18 BRPM | TEMPERATURE: 96.4 F | WEIGHT: 161 LBS | DIASTOLIC BLOOD PRESSURE: 59 MMHG | OXYGEN SATURATION: 96 %

## 2018-02-21 LAB
ANION GAP SERPL CALCULATED.3IONS-SCNC: 8 MMOL/L (ref 3–14)
BASOPHILS # BLD AUTO: 0.1 10E9/L (ref 0–0.2)
BASOPHILS NFR BLD AUTO: 0.9 %
BUN SERPL-MCNC: 11 MG/DL (ref 7–30)
CALCIUM SERPL-MCNC: 8.5 MG/DL (ref 8.5–10.1)
CHLORIDE SERPL-SCNC: 95 MMOL/L (ref 94–109)
CO2 SERPL-SCNC: 26 MMOL/L (ref 20–32)
CREAT SERPL-MCNC: 1.02 MG/DL (ref 0.66–1.25)
DIFFERENTIAL METHOD BLD: ABNORMAL
EOSINOPHIL # BLD AUTO: 1 10E9/L (ref 0–0.7)
EOSINOPHIL NFR BLD AUTO: 9.2 %
ERYTHROCYTE [DISTWIDTH] IN BLOOD BY AUTOMATED COUNT: 13 % (ref 10–15)
GFR SERPL CREATININE-BSD FRML MDRD: 76 ML/MIN/1.7M2
GLUCOSE BLDC GLUCOMTR-MCNC: 122 MG/DL (ref 70–99)
GLUCOSE BLDC GLUCOMTR-MCNC: 154 MG/DL (ref 70–99)
GLUCOSE BLDC GLUCOMTR-MCNC: 93 MG/DL (ref 70–99)
GLUCOSE SERPL-MCNC: 88 MG/DL (ref 70–99)
HCT VFR BLD AUTO: 38.4 % (ref 40–53)
HGB BLD-MCNC: 13.3 G/DL (ref 13.3–17.7)
IMM GRANULOCYTES # BLD: 0.2 10E9/L (ref 0–0.4)
IMM GRANULOCYTES NFR BLD: 2.1 %
LYMPHOCYTES # BLD AUTO: 4 10E9/L (ref 0.8–5.3)
LYMPHOCYTES NFR BLD AUTO: 37.9 %
MAGNESIUM SERPL-MCNC: 2.4 MG/DL (ref 1.6–2.3)
MCH RBC QN AUTO: 30.5 PG (ref 26.5–33)
MCHC RBC AUTO-ENTMCNC: 34.6 G/DL (ref 31.5–36.5)
MCV RBC AUTO: 88 FL (ref 78–100)
MONOCYTES # BLD AUTO: 1.1 10E9/L (ref 0–1.3)
MONOCYTES NFR BLD AUTO: 10.6 %
NEUTROPHILS # BLD AUTO: 4.1 10E9/L (ref 1.6–8.3)
NEUTROPHILS NFR BLD AUTO: 39.3 %
NRBC # BLD AUTO: 0 10*3/UL
NRBC BLD AUTO-RTO: 0 /100
PHOSPHATE SERPL-MCNC: 3.3 MG/DL (ref 2.5–4.5)
PLATELET # BLD AUTO: 224 10E9/L (ref 150–450)
POTASSIUM SERPL-SCNC: 3.9 MMOL/L (ref 3.4–5.3)
RBC # BLD AUTO: 4.36 10E12/L (ref 4.4–5.9)
SODIUM SERPL-SCNC: 129 MMOL/L (ref 133–144)
WBC # BLD AUTO: 10.4 10E9/L (ref 4–11)

## 2018-02-21 PROCEDURE — 40000275 ZZH STATISTIC RCP TIME EA 10 MIN

## 2018-02-21 PROCEDURE — 99239 HOSP IP/OBS DSCHRG MGMT >30: CPT | Performed by: INTERNAL MEDICINE

## 2018-02-21 PROCEDURE — A9270 NON-COVERED ITEM OR SERVICE: HCPCS | Mod: GY | Performed by: ANESTHESIOLOGY

## 2018-02-21 PROCEDURE — 00000146 ZZHCL STATISTIC GLUCOSE BY METER IP

## 2018-02-21 PROCEDURE — 94640 AIRWAY INHALATION TREATMENT: CPT

## 2018-02-21 PROCEDURE — A9270 NON-COVERED ITEM OR SERVICE: HCPCS | Mod: GY | Performed by: NURSE PRACTITIONER

## 2018-02-21 PROCEDURE — 94640 AIRWAY INHALATION TREATMENT: CPT | Mod: 76

## 2018-02-21 PROCEDURE — 84100 ASSAY OF PHOSPHORUS: CPT | Performed by: INTERNAL MEDICINE

## 2018-02-21 PROCEDURE — 25000132 ZZH RX MED GY IP 250 OP 250 PS 637: Mod: GY | Performed by: NURSE PRACTITIONER

## 2018-02-21 PROCEDURE — 83735 ASSAY OF MAGNESIUM: CPT | Performed by: INTERNAL MEDICINE

## 2018-02-21 PROCEDURE — 40000239 ZZH STATISTIC VAT ROUNDS

## 2018-02-21 PROCEDURE — 80048 BASIC METABOLIC PNL TOTAL CA: CPT | Performed by: INTERNAL MEDICINE

## 2018-02-21 PROCEDURE — 40000257 ZZH STATISTIC CONSULT NO CHARGE VASC ACCESS

## 2018-02-21 PROCEDURE — 25000132 ZZH RX MED GY IP 250 OP 250 PS 637: Mod: GY | Performed by: ANESTHESIOLOGY

## 2018-02-21 PROCEDURE — 25000128 H RX IP 250 OP 636: Performed by: PHYSICIAN ASSISTANT

## 2018-02-21 PROCEDURE — 85025 COMPLETE CBC W/AUTO DIFF WBC: CPT | Performed by: INTERNAL MEDICINE

## 2018-02-21 PROCEDURE — 25000125 ZZHC RX 250: Performed by: NURSE PRACTITIONER

## 2018-02-21 PROCEDURE — 27210429 ZZH NUTRITION PRODUCT INTERMEDIATE LITER

## 2018-02-21 RX ORDER — NYSTATIN 100000 [USP'U]/G
POWDER TOPICAL
Qty: 15 G | Refills: 0 | Status: SHIPPED | OUTPATIENT
Start: 2018-02-21 | End: 2018-05-01

## 2018-02-21 RX ADMIN — IPRATROPIUM BROMIDE AND ALBUTEROL SULFATE 3 ML: .5; 3 SOLUTION RESPIRATORY (INHALATION) at 11:09

## 2018-02-21 RX ADMIN — LEVOTHYROXINE SODIUM 150 MCG: 150 TABLET ORAL at 06:03

## 2018-02-21 RX ADMIN — Medication 1 PACKET: at 11:58

## 2018-02-21 RX ADMIN — IPRATROPIUM BROMIDE AND ALBUTEROL SULFATE 3 ML: .5; 3 SOLUTION RESPIRATORY (INHALATION) at 07:48

## 2018-02-21 RX ADMIN — HYDROCORTISONE 20 MG: 10 TABLET ORAL at 09:49

## 2018-02-21 RX ADMIN — TAZOBACTAM SODIUM AND PIPERACILLIN SODIUM 3.38 G: 375; 3 INJECTION, SOLUTION INTRAVENOUS at 01:04

## 2018-02-21 RX ADMIN — Medication 40 MG: at 11:57

## 2018-02-21 RX ADMIN — TAZOBACTAM SODIUM AND PIPERACILLIN SODIUM 3.38 G: 375; 3 INJECTION, SOLUTION INTRAVENOUS at 06:24

## 2018-02-21 RX ADMIN — BRIVARACETAM 100 MG: 10 SOLUTION ORAL at 09:48

## 2018-02-21 RX ADMIN — INSULIN ASPART 1 UNITS: 100 INJECTION, SOLUTION INTRAVENOUS; SUBCUTANEOUS at 04:14

## 2018-02-21 RX ADMIN — CHOLECALCIFEROL TAB 25 MCG (1000 UNIT) 2000 UNITS: 25 TAB at 09:48

## 2018-02-21 RX ADMIN — Medication 15 ML: at 09:47

## 2018-02-21 RX ADMIN — CARBAMAZEPINE 150 MG: 100 SUSPENSION ORAL at 01:04

## 2018-02-21 RX ADMIN — CARBAMAZEPINE 150 MG: 100 SUSPENSION ORAL at 11:57

## 2018-02-21 RX ADMIN — CARBAMAZEPINE 150 MG: 100 SUSPENSION ORAL at 06:03

## 2018-02-21 NOTE — PLAN OF CARE
Problem: Patient Care Overview  Goal: Plan of Care/Patient Progress Review  Outcome: No Change  Contact isolation maintained.  Pt unable to speak clearly due to TBI, nods yes/shakes head no, oriented to self only.  Denies pain.  VSS.  Tele: NSR.  Rash in groin, powder applied, buttocks have erythema, blanchable.  Positioning off buttocks.  Turning/repo q2h.  Skin irritation on right arm, powder applied.  Bowel sounds WDL. Incontinent of stool. GJ tube is WDL with enteral feedings at 60mls-tolerating, denies nausea. 150cc free water flushes q4hrs. Lerma is patent, good output.  L PICC is WDL, saline locked, both lumens.  On IV abx. Good blood return noted.  Turn and reposition with assist of 2, pt has wheelchair from home.  Alarm active and audible.  Right sided hemiplegia.  Plan of care: continue to monitor.

## 2018-02-21 NOTE — PLAN OF CARE
Problem: Patient Care Overview  Goal: Plan of Care/Patient Progress Review  PT: Pt is discharging this date.  Will defer further PT to next level of care.  PT does continue to recommend TCU as pt is not at his baseline and is requiring mod-max Ax2 for sitting balance and maxAx2 for SPT from bed > chair.    Physical Therapy Discharge Summary    Reason for therapy discharge:    Discharged to home with home therapy.    Progress towards therapy goal(s). See goals on Care Plan in [facility] electronic health record for goal details.  Goals not met.  Barriers to achieving goals:   discharge from facility.    Therapy recommendation(s):    Continued therapy is recommended.  Rationale/Recommendations:  Strengthening, progression of participation with bed mobility and SPT.

## 2018-02-21 NOTE — PLAN OF CARE
Problem: Patient Care Overview  Goal: Plan of Care/Patient Progress Review  Outcome: Adequate for Discharge Date Met: 02/21/18  Pt A/O x 1-2, non-verbal, hard to assess. Able to answer yes and no questions. PICC line removed by PICC nurse. Incontinent of bowel this shift. MASD, miconazole powder applied to groin. GJ tube in place. Tolerating tube feeds @ 60/hr with 150 Q 4hr flushes. Up with 2 to the chair with ceiling lift. Dressed. Placed in own wheelchair. Discharge packet with pt. Pt to leave @ 1400.

## 2018-02-21 NOTE — PROGRESS NOTES
1700 pt transferred to [id number]. Report called to RN. All belongings sent with pt.Stable at transfer

## 2018-02-21 NOTE — DISCHARGE SUMMARY
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 He was initially in the ICU due to hypotension, but quickly improved with levophed, fluids and antibiotics and was transferred to the floor on 2/16 for further care. Current problems include: See H & P for detail     Hospital Course   Keyon Farias was admitted on 2/14/2018.  The following problems were addressed during his hospitalization:    Sepsis due to right lower lobe pneumonia;   Upon admission, he required brief levophed due to hypotension in the ICU. This quickly  Improved with antibiotic and IVF.  He is not requiring oxygen at time of discharge.   - treated with Zosyn in hospital and will transition to Augmentin for 3 more days to complete a total 10 days course.      Traumatic brain injury with aphasia, dysphagia, and increased aspiration risk; stable.    s/p GJ tube replacement on 2/19 due to clogged tube  Unable to continue w/ J-port TF  On 2/19 and have been unable to unclog Keyon's J-port, in spite of usual attempts. He is now s/p GJ tube replacement on 2/19  - tolerating TF at this time. Continue PTA regimen at discharge.   - continue NPO.   - home RN needed to monitor TF and ongoing tolerance and speech therapy for further addressing aphasia and dysphagia  - also ordered suctioning equipment due to his dysphagia      Hyponatremia; Na drop from 138 to 128, likely related to NPO status and  hypotonic IVF. Na at 129 on day of discharge.   - recommend f/u BMP in 2 days post discharge.     Candidal intertrigo  Rash noted in the groin most consistent with candidal infection.    - Nystatin powder TID    Deconditioning:  Patient is below his baseline functional status with transfers at time of discharge. He will benefit from ongoing PT/OT for improving his functional status at home.     Chronic medical conditions; stable.    Seizure disorder thought due to traumatic brain injury; stable.  - continue prior to admission Tegretol; have restarted Brevaracitam      Panhypopituitarism; stable.  - continue hydrocortisone 20 mg Q a.m. And 10 mg Q p.m.    # Discharge Pain Plan:   - Patient currently has NO PAIN and is not being prescribed pain medications on discharge.    Yogi Irizarry    Significant Results and Procedures   See labs and imaging below    Pending Results     Unresulted Labs Ordered in the Past 30 Days of this Admission     No orders found from 12/16/2017 to 2/15/2018.          Code Status   Full Code       Primary Care Physician   Carlos Gomez    Physical Exam   Temp: 96.4  F (35.8  C) Temp src: Oral BP: 107/59 Pulse: 64 Heart Rate: 69 Resp: 18 SpO2: 96 % O2 Device: None (Room air)    Vitals:    02/18/18 0500 02/20/18 0600 02/21/18 0553   Weight: 76 kg (167 lb 8.8 oz) 72.4 kg (159 lb 11.2 oz) 73 kg (161 lb)     Vital Signs with Ranges  Temp:  [96.3  F (35.7  C)-98.5  F (36.9  C)] 96.4  F (35.8  C)  Pulse:  [64-78] 64  Heart Rate:  [66-80] 69  Resp:  [18-23] 18  BP: (107-118)/(59-76) 107/59  SpO2:  [94 %-98 %] 96 %  I/O last 3 completed shifts:  In: 1218 [P.O.:1; I.V.:300; NG/GT:842]  Out: 1200 [Urine:1200]    Constitutional:  Alert, awake and no apparent distress  Respiratory:   CTA bilaterally  Cardiovascular: regular rate and rhythm  GI: soft and non-tender  Skin/Integumen: right arm flaccid/contracted    Discharge Disposition   Discharged to home  Condition at discharge:  Stable    Consultations This Hospital Stay   VASCULAR ACCESS ADULT IP CONSULT  PHYSICAL THERAPY ADULT IP CONSULT  OCCUPATIONAL THERAPY ADULT IP CONSULT  VASCULAR ACCESS ADULT IP CONSULT  WOUND OSTOMY CONTINENCE NURSE  IP CONSULT  NUTRITION SERVICES ADULT IP CONSULT  SOCIAL WORK IP CONSULT  PHARMACY IP CONSULT  SPEECH LANGUAGE PATH ADULT IP CONSULT  SOCIAL WORK IP CONSULT    Time Spent on this Encounter   Yogi DYKES, personally saw the patient today and spent 45 minutes discharging this patient.    Discharge Orders     Home care nursing referral     Discharge Instructions   If questions or problems arise regarding tube function (e.g. leaking, dislodges, etc.) Contact Interventional Radiology department 24 hours a day.    For procedures that were done at [facility]:    8 AM - 4 PM Monday through Friday Contact the Nurse Line [phone]  For afterhours and weekends [phone]    Ask for the Interventional Radiologist-on call.     For procedures that were done at [facility]:  8 AM - 4 PM Monday through Friday Contact   [phone]  For afterhours and weekends: [phone]   Ask for the Interventional Radiologist on call.       IF DIRECTED by the RADIOLOGIST, related to specific problems with the tube functioning,  go to the Emergency Department.     Follow-up and recommended labs and tests    Follow up with primary care provider, [facility], within 3-5 days for hospital follow- up.  The following labs/tests are recommended: basic metabolic panel in 2 days     Activity   Your activity upon discharge: activity as tolerated     MD face to face encounter   Documentation of Face to Face and Certification for Home Health Services    I certify that patient: Keyon Farias is under my care and that I, or a nurse practitioner or physician's assistant working with me, had a face-to-face encounter that meets the physician face-to-face encounter requirements with this patient on:  2/21/2018.    This encounter with the patient was in whole, or in part, for the following medical condition, which is the primary reason for home health care: pneumonia.    I certify that, based on my findings, the following services are medically necessary home health services: Nursing.    My clinical findings support the need for the above services because: Nurse is needed: To provide assessment and oversight required in the home to assure adherence to the medical plan due to: patient ongoing on tube feeding and ongoing assessment of tolerance and advancement..    Further, I certify that my clinical findings support that this patient is homebound (i.e. absences from home require considerable and taxing effort and are for medical reasons or [employer] services or infrequently or of short duration when for other reasons) because: Requires assistance of another person or specialized equipment to access medical services because patient: Requires supervision of another for safe transfer...    Based on the above findings. I certify that this patient is confined to the home and needs intermittent skilled nursing care, physical therapy and/or speech therapy.  The patient is under my care, and I have initiated the establishment of the plan of care.  This patient will be followed by a physician who will periodically review the plan of care.  Physician/Provider to provide follow up care: Shauna Riley [facility] physician (the Medicare certified [place] provider): Yogi Irizarry  Physician Signature: See electronic signature associated with these discharge orders.  Date: 2/21/2018     Full Code     Diet   Follow this diet upon discharge: nothing by mouth. Tube feeding       Discharge Medications   Current Discharge Medication List      START taking these medications    Details   nystatin (MYCOSTATIN) 183984 UNIT/GM POWD Apply three times daily to the groin for 5 days.  Qty: 15 g, Refills: 0    Associated  Diagnoses: Candidiasis of skin         CONTINUE these medications which have NOT CHANGED    Details   carBAMazepine (TEGRETOL) 100 MG/5ML suspension Take 150 mg by mouth every 6 hours      melatonin (MELATONIN) 1 MG/ML LIQD liquid 6 mg by Jejunal Tube route nightly as needed for sleep      Brivaracetam (BRIVIACT) 10 MG/ML solution Take 100 mg by mouth 2 times daily      pantoprazole (PROTONIX) SUSP suspension Take 20 mg by mouth daily       potassium & sodium phosphates (NEUTRA-PHOS) 280-160-250 MG Packet Take 1 packet by mouth 3 times daily 0900, 1500, 2100.       VITAMIN D, CHOLECALCIFEROL, PO Take 2,000 Units by mouth daily      bacitracin ointment Apply topically daily as needed for wound care To PEG site.      aspirin 10 mg/mL 8.1 mLs (81 mg) by Per J Tube route daily    Associated Diagnoses: Routine adult health maintenance      multivitamins with minerals (CERTAVITE/CEROVITE) LIQD liquid 15 mLs by Per J Tube route daily    Associated Diagnoses: Necrotizing pancreatitis; Malnutrition (H)      fiber modular (NUTRISOURCE FIBER) packet 1 packet by Per Feeding Tube route 3 times daily    Associated Diagnoses: Necrotizing pancreatitis      calcium carbonate 1250 (500 CA) MG/5ML SUSP suspension 5 mLs (1,250 mg) by Per J Tube route 3 times daily (with meals)  Qty: 450 mL    Associated Diagnoses: Malnutrition (H)      levothyroxine (SYNTHROID) 25 mcg/mL 6 mLs (150 mcg) by Per J Tube route every morning (before breakfast)    Associated Diagnoses: Panhypopituitarism (H)      !! hydrocortisone (CORTEF) 2 mg/mL 10 mLs (20 mg) by Per J Tube route every morning    Associated Diagnoses: Panhypopituitarism (H)      !! hydrocortisone (CORTEF) 2 mg/mL Take 5 mLs (10 mg) by mouth every evening    Associated Diagnoses: Panhypopituitarism (H)      bisacodyl (DULCOLAX) 10 MG Suppository Place 1 suppository (10 mg) rectally daily as needed for constipation  Qty: 30 suppository    Associated Diagnoses: Constipation, unspecified  constipation type      miconazole (MICATIN; MICRO GUARD) 2 % powder Apply topically every hour as needed for other (topical candidiasis)    Associated Diagnoses: Rash      ACETAMINOPHEN  mg by Per Feeding Tube route every 4 hours as needed for pain      testosterone cypionate (DEPOTESTOTERONE CYPIONATE) 200 MG/ML injection Inject 100 mg into the muscle See Admin Instructions Every 2-3 weeks.      albuterol (2.5 MG/3ML) 0.083% neb solution Take 1 vial (2.5 mg) by nebulization every 4 hours as needed for shortness of breath / dyspnea  Qty: 1 Box, Refills: 0       !! - Potential duplicate medications found. Please discuss with provider.        Allergies   Allergies   Allergen Reactions     Dilantin [Phenytoin Sodium]      Valproic Acid      Toxicity c bone marrow suspension, elevated ammonia levels      Data   Most Recent 3 CBC's:  Recent Labs   Lab Test  02/21/18   0626  02/20/18   0558  02/19/18   2305  02/19/18   0440   WBC  10.4  11.0   --   8.4   HGB  13.3  13.5  14.3  11.5*   MCV  88  88   --   89   PLT  224  219   --   163      Most Recent 3 BMP's:  Recent Labs   Lab Test  02/21/18   0626  02/20/18   0558  02/19/18   2305  02/19/18   0440   NA  129*  130*   --   128*   POTASSIUM  3.9  4.0  4.0  3.2*   CHLORIDE  95  97   --   97   CO2  26  27   --   24   BUN  11  10   --   9   CR  1.02  0.98   --   0.72   ANIONGAP  8  6   --   7   STEVE  8.5  8.4*   --   7.2*   GLC  88  77   --   432*     Most Recent 2 LFT's:  Recent Labs   Lab Test  02/14/18   1300  02/14/18   0350   AST  18  33   ALT  25  36   ALKPHOS  66  98   BILITOTAL  0.6  0.5     Most Recent INR's and Anticoagulation Dosing History:  Anticoagulation Dose History     Recent Dosing and Labs Latest Ref Rng & Units 12/1/2016 1/22/2017 1/22/2017 1/23/2017 1/25/2017 1/30/2017 2/7/2017    INR 0.86 - 1.14 1.30(H) 2.48(H) 2.58(H) 1.53(H) 1.49(H) 1.32(H) 1.27(H)        Most Recent 3 Troponin's:  Recent Labs   Lab Test  01/22/17   0500  01/21/17   9905   01/21/17   1600   TROPI  0.017  0.025  0.028     Most Recent Cholesterol Panel:  Recent Labs   Lab Test  11/29/16   0430   TRIG  100     Most Recent 6 Bacteria Isolates From Any Culture (See EPIC Reports for Culture Details):  Recent Labs   Lab Test  02/15/18   0020  02/14/18   0854  02/14/18   0450  02/14/18   0350  12/26/17   1115  12/26/17   1105   CULT  Light growth  Methicillin resistant Staphylococcus aureus (MRSA)  This isolate is presumed to be clindamycin resistant based on detection of inducible   clindamycin resistance. Erythromycin and clindamycin are resistant, therefore, they are   not recommended for use.  *  No growth  No growth  No growth  No growth  No growth     Most Recent TSH, T4 and A1c Labs:  Recent Labs   Lab Test  02/15/18   0610  01/29/17   0403  12/01/16   0400   TSH   --    --   <0.01*   T4   --   0.54*  0.73*   A1C  6.6*   --    --      Results for orders placed or performed during the hospital encounter of 02/14/18   Chest  XR, 1 view PORTABLE    Narrative    CHEST SINGLE VIEW PORTABLE  2/14/2018 5:30 AM     HISTORY: Shortness of breath, fever and hypoxia.    COMPARISON: 1/6/2018.    FINDINGS: Hypoinflated lungs. A small to moderate-sized region of hazy  opacities in the inferior right lung. The lungs are otherwise clear.  Normal-sized cardiac silhouette.      Impression    IMPRESSION: A small to moderate-sized region of hazy opacities in the  inferior right lung, new since 1/6/2018. This likely represents  pneumonia.    ELODIA ANN MD   IR Gastro Jejunostomy Tube Change    Narrative    INTERVENTIONAL RADIOLOGY GASTRO JEJUNOSTOMY TUBE CHANGE  2/19/2018  8:32 AM    HISTORY:  Clogged gastrojejunostomy feeding tube.    FLUOROSCOPY TIME: 1.4 minutes    TOTAL FLUOROSCOPY DOSE: 37 mGy (air kerma)    TECHNIQUE:  Informed consent was obtained from the patient's family. A  timeout was performed.  The indwelling gastrojejunostomy tube and  surrounding skin were prepped and draped in a sterile  manner. Contrast  could not be injected through the jejunal limb because it was  completely clogged, so the retention balloon was deflated and the tube  was removed. A 5 Japanese Kumpe catheter was used to regain access from  the access site to the jejunum. Over a Glidewire, a new 18 Japanese  gastrojejunostomy feeding tube was placed. The balloon was inflated  with 10 mL of dilute contrast. A final image was obtained.    FINDINGS:  Final fluoroscopic image demonstrates appropriate  positioning of the gastrojejunostomy tube with the tip beyond the  ligament of Treitz.      Impression    IMPRESSION:  Replacement of a clogged feeding tube.    ANNETTE CASTRO MD

## 2018-02-21 NOTE — PROGRESS NOTES
CHE  D) Patient is set to discharge home today.  I) RN Care Coordinator asked CHE to arrange wheelchair ride home today. Set up [facility] ride at 1400, per his mother's request. His mother, Savannah will be present at home when patient arrives. There is a ramp. Savannah asks that patient's clothes accompany him and new meds be filled here. Patient's wheelchair is in his room. He has medicaid, which typically covers transport from the hospital.  RN CC has arranged the home care.   P) D/C home today at 1400.

## 2018-02-21 NOTE — DISCHARGE INSTRUCTIONS
[facility] will be following you at home for RN, PT/OT & speech services.  Please call [phone] if you have any questions.    [facility] will be following for TFs supplies and suction machine.  Please call [phone] ext. [phone] if you have any questions.    TF regimen:    Jevity 1.5, total of 5.5 cans/day. He starts at 10 AM and runs at 100 mL/hr, providing 1955 jamel, 83 gm protein  NutriSource Fiber 1 packet TID = 45 kcal, 9 fiber       Water flushes: 120 mL every 4 hours

## 2018-02-21 NOTE — PLAN OF CARE
Problem: Patient Care Overview  Goal: Plan of Care/Patient Progress Review  Outcome: No Change  Contact isolation maintained.  Pt unable to speak clearly due to TBI, nods yes/shakes head no, oriented to self only.  Denies pain.  VSS.  Tele: NSR.  Rash in groin, powder applied, buttocks have erythema, blanchable.  Positioning off buttocks.  Turning/repo q2h.  Skin irritation on right arm, powder applied.  Bowel sounds WDL.  Pt unsure if passing flatus.  Large incontinent stool this evening.  GJ tube is WDL with enteral feedings at 20mls/hr, goal is 60. Lerma is patent, good output.  L PICC is WDL, saline locked, both lumens.  Good blood return noted.  Turn and reposition with assist of 2, pt has wheelchair from home.  Alarm active and audible.  Right sided hemiplegia.  Plan of care: continue to monitor.

## 2018-02-21 NOTE — PROGRESS NOTES
Per Md orders for resumption of home care and TFs at discharge called and spoke to pt's Mother Savannah.  Informed Savannah that pt will be discharging home with resumption of home care with [facility] and that pt requires assist of lift to transfer and mentioned that it was noted in chart that she no longer has nidia lift a home.  Offered to have nidia lift ordered for pt, but Savannah declined stating the she has the support at home to     Asked Savannah which agency supports pt for TFs and Savannah stated [facility].  Savannah requested a suction machine for pt at home.  Called [facility] and spoke to Sofia regarding pt discharging home with resumption of TFs and that pt will need suction machine for at home.  Per Sofia, form for Md to sign will be faxed to [facility] and requested form and md note indicating reason for suction be faxed to [phone].  Information requested was faxed to number listed.   Received form for suction machine and had Md complete and sign and faxed back to [facility] at [phone].  Called [facility] to inform agency that pt will be discharging home with resumption of orders. [facility] requested orders and md note indicating why pt needs Home Care be faxed to [phone]. Orders and note faxed to number listed.    Called pt's mother back to inform her the home care and [facility] informed of pt's returning home and suction machine was ordered.  Per Pt's mother she would call and schedule pt's f/u with his PCP.

## 2018-02-22 LAB — GLUCOSE BLDC GLUCOMTR-MCNC: 100 MG/DL (ref 70–99)

## 2018-03-02 ENCOUNTER — HOSPITAL ENCOUNTER (OUTPATIENT)
Dept: CT IMAGING | Facility: CLINIC | Age: 58
Discharge: HOME | End: 2018-03-02
Attending: INTERNAL MEDICINE | Admitting: INTERNAL MEDICINE
Payer: MEDICARE

## 2018-03-02 DIAGNOSIS — K63.9 MURAL THICKENING OF COLON: ICD-10-CM

## 2018-03-02 PROCEDURE — 74177 CT ABD & PELVIS W/CONTRAST: CPT

## 2018-03-02 PROCEDURE — 25000125 ZZHC RX 250: Performed by: INTERNAL MEDICINE

## 2018-03-02 PROCEDURE — 25000128 H RX IP 250 OP 636: Performed by: INTERNAL MEDICINE

## 2018-03-02 RX ORDER — IOPAMIDOL 755 MG/ML
79 INJECTION, SOLUTION INTRAVASCULAR ONCE
Status: COMPLETED | OUTPATIENT
Start: 2018-03-02 | End: 2018-03-02

## 2018-03-02 RX ADMIN — IOPAMIDOL 79 ML: 755 INJECTION, SOLUTION INTRAVENOUS at 12:37

## 2018-03-02 RX ADMIN — SODIUM CHLORIDE 63 ML: 9 INJECTION, SOLUTION INTRAVENOUS at 12:37

## 2018-03-13 ENCOUNTER — APPOINTMENT (OUTPATIENT)
Dept: INTERVENTIONAL RADIOLOGY/VASCULAR | Facility: CLINIC | Age: 58
End: 2018-03-13
Attending: INTERNAL MEDICINE
Payer: MEDICARE

## 2018-03-13 ENCOUNTER — HOSPITAL ENCOUNTER (OUTPATIENT)
Facility: CLINIC | Age: 58
Discharge: HOME | End: 2018-03-13
Attending: RADIOLOGY | Admitting: RADIOLOGY
Payer: MEDICARE

## 2018-03-13 VITALS
OXYGEN SATURATION: 98 % | BODY MASS INDEX: 20.99 KG/M2 | RESPIRATION RATE: 16 BRPM | HEIGHT: 72 IN | WEIGHT: 155 LBS | DIASTOLIC BLOOD PRESSURE: 63 MMHG | SYSTOLIC BLOOD PRESSURE: 106 MMHG | TEMPERATURE: 96.6 F

## 2018-03-13 DIAGNOSIS — Z51.89 TREATMENT: ICD-10-CM

## 2018-03-13 PROCEDURE — 40000854 ZZH STATISTIC SIMPLE TUBE INSERTION/CHARGE, PORT, CATH, FISTULOGRAM

## 2018-03-13 PROCEDURE — 49452 REPLACE G-J TUBE PERC: CPT

## 2018-03-13 PROCEDURE — 27210905 ZZH KIT CR7

## 2018-03-13 PROCEDURE — C1769 GUIDE WIRE: HCPCS

## 2018-03-13 PROCEDURE — 27210815 ZZ H TUBE GASTRO CR13

## 2018-03-13 NOTE — DISCHARGE INSTRUCTIONS
G-tube Exchange Discharge Instructions     After you go home:      You may resume your normal diet    Care of Insertion Site:      For the first 48 hrs, check your puncture site every couple hours while you are awake     You may remove/change the dressing tomorrow    You may shower tomorrow    No tub baths, whirlpools or swimming until your puncture site has fully healed     Activity       You may go back to normal activity in 24 hours    Wait 48 hours before lifting, straining, exercise or other strenuous activity    Medicines:      You may resume all medications    Resume your Warfarin/Coumadin at your regular dose today. Follow up with your provider to have your INR rechecked    Resume your Platelet Inhibitors and Aspirin tomorrow at your regular dose    For minor pain, you may take Acetaminophen (Tylenol) or Ibuprofen (Advil)                 Call the provider who ordered this procedure if:      Blood or fluid is draining from the site    The site is red, swollen, hot, tender or there is foul-smelling drainage    Chills or a fever greater than 101 F (38 C)    Increased pain at the site    Any questions or concerns    Call  911 or go to the Emergency Room if:      Severe pain or trouble breathing    Bleeding that you cannot control      If you have questions call:          [facility] Radiology Dept @ [phone]        The provider who performed your procedure was _________________.        Caring for your G-tube    Tube Maintenance:    Possible problems with your tube may include:      Clogged with medications or feedings - most obstructions can be cleared with a small (3cc) syringe and warm water. This may be repeated until the tube is unclogged. This can be prevented by frequently flushing the tube with water (60cc) during the day and always after medications & feedings.      Tube pulls out or falls out -cover the opening with gauze & tape. Call [phone] for further instructions      Skin breakdown  and/or yeast infections at the insertion site - use of skin barrier ointments and anti-fungal powders can treat most site irritations.  Ask your pharmacist or provider for assistance (a prescription is not necessary).    In general, tube problems (including pulled tubes) are NOT emergency situations. Unless the pulling out of a tube is accompanied by uncontrollable bleeding, please DO NOT GO TO THE EMERGENCY ROOM!  Call [phone] with problems.    Tube Care:      Change the gauze dressing every 24 hours and if soiled (dirty).  Stabilize all tubes securely by using gauze and tape.  Clean tube site with soap & water using a cotton applicator (Q-tip) as needed to prevent irritation.    Flush feeding tube with 60cc of warm or room temperature water before and after meds.  To prevent the tube from clogging, ask your provider or pharmacist if liquid forms of your medications are available. If not, crush the pills well & be sure to flush the tube before & after all medications.    Flush feeding tube a minimum of every 4 hours and when feeding is completed with 60cc of water to keep the tube clear and avoid clogging.    Pt may use an abdominal (waist) binder to protect the G-tube.    If there is continued oozing or bleeding, redness, yellow/green/foul smelling drainage    STOP the feedings & use of the tube immediately if there is:      Continued oozing or bleeding at the site    Redness    Yellow/green or foul smelling drainage at the site    Uncontrolled stomach pain    Many of the supplies mentioned above can be purchased at your local pharmacy      For issues with your tube, please call:    [place] Interventional Radiology Dept at [phone]

## 2018-03-13 NOTE — IP AVS SNAPSHOT
[place] [address]    [place] [address]    Phone:  [phone]                                       After Visit Summary   3/13/2018    Keyon Farias    MRN: [id number]           After Visit Summary Signature Page     I have received my discharge instructions, and my questions have been answered. I have discussed any challenges I see with this plan with the nurse or doctor.    ..........................................................................................................................................  Patient/Patient Representative Signature      ..........................................................................................................................................  Patient Representative Print Name and Relationship to Patient    ..................................................               ................................................  Date                                            Time    ..........................................................................................................................................  Reviewed by Signature/Title    ...................................................              ..............................................  Date                                                            Time

## 2018-03-13 NOTE — PROGRESS NOTES
Here with care givers for G-tube exchange. Care giver states it has came out on Sunday, Has not had food since then, only essential meds through the tube. Pt is non-verbal. Information from Keyon PAULA. Pt has had tube for a long time and care givers are very familiar with tube and the care of it. D/c instructions given to care givers.

## 2018-03-13 NOTE — PROGRESS NOTES
1605 Pt returned from IR. G-tube intact & clamped. Drsg around site CDI. Surrounding skin pink - irritated by tape used prior to tube exchange. No skin tears noted.  1630 Pt discharged per w/c to private vehicle. Accompanied by care giver. All personal belongings taken with pt.

## 2018-03-13 NOTE — IP AVS SNAPSHOT
MRN:[id number]                      After Visit Summary   3/13/2018    Keyon Farias    MRN: [id number]           Visit Information        Department      3/13/2018  1:51 PM [facility]          Review of your medicines      UNREVIEWED medicines. Ask your doctor about these medicines        Dose / Directions    ACETAMINOPHEN PO        Dose:  650 mg   650 mg by Per Feeding Tube route every 4 hours as needed for pain   Refills:  0       albuterol (2.5 MG/3ML) 0.083% neb solution        Dose:  1 vial   Take 1 vial (2.5 mg) by nebulization every 4 hours as needed for shortness of breath / dyspnea   Quantity:  1 Box   Refills:  0       aspirin 10 mg/mL Susp   Used for:  Routine adult health maintenance        Dose:  81 mg   8.1 mLs (81 mg) by Per J Tube route daily   Refills:  0       bacitracin ointment        Apply topically daily as needed for wound care To PEG site.   Refills:  0       bisacodyl 10 MG Suppository   Commonly known as:  DULCOLAX   Used for:  Constipation, unspecified constipation type        Dose:  10 mg   Place 1 suppository (10 mg) rectally daily as needed for constipation   Quantity:  30 suppository   Refills:  0       BRIVIACT 10 MG/ML solution   Generic drug:  Brivaracetam        Dose:  100 mg   Take 100 mg by mouth 2 times daily   Refills:  0       calcium carbonate 1250 (500 CA) MG/5ML Susp suspension   Used for:  Malnutrition (H)        Dose:  1250 mg   5 mLs (1,250 mg) by Per J Tube route 3 times daily (with meals)   Quantity:  450 mL   Refills:  0       carBAMazepine 100 MG/5ML suspension   Commonly known as:  TEGretol        Dose:  150 mg   Take 150 mg by mouth every 6 hours   Refills:  0       fiber modular packet   Used for:  Necrotizing pancreatitis        Dose:  1 packet   1 packet by Per Feeding Tube route 3 times daily   Refills:  0       * hydrocortisone 2 mg/mL Susp   Commonly known as:  CORTEF   Used for:  Panhypopituitarism (H)        Dose:  20  mg   10 mLs (20 mg) by Per J Tube route every morning   Refills:  0       * hydrocortisone 2 mg/mL Susp   Commonly known as:  CORTEF   Used for:  Panhypopituitarism (H)        Dose:  10 mg   Take 5 mLs (10 mg) by mouth every evening   Refills:  0       levothyroxine 25 mcg/mL Susp   Commonly known as:  SYNTHROID   Used for:  Panhypopituitarism (H)        Dose:  150 mcg   6 mLs (150 mcg) by Per J Tube route every morning (before breakfast)   Refills:  0       melatonin 1 MG/ML Liqd liquid        Dose:  6 mg   6 mg by Jejunal Tube route nightly as needed for sleep   Refills:  0       miconazole 2 % powder   Commonly known as:  MICATIN; MICRO GUARD   Used for:  Rash        Apply topically every hour as needed for other (topical candidiasis)   Refills:  0       multivitamins with minerals Liqd liquid   Used for:  Necrotizing pancreatitis, Malnutrition (H)        Dose:  15 mL   15 mLs by Per J Tube route daily   Refills:  0       nystatin 172831 UNIT/GM Powd   Commonly known as:  MYCOSTATIN   Used for:  Candidiasis of skin        Apply three times daily to the groin for 5 days.   Quantity:  15 g   Refills:  0       pantoprazole Susp suspension   Commonly known as:  PROTONIX        Dose:  20 mg   Take 20 mg by mouth daily   Refills:  0       potassium & sodium phosphates 280-160-250 MG Packet   Commonly known as:  NEUTRA-PHOS        Dose:  1 packet   Take 1 packet by mouth 3 times daily 0900, 1500, 2100.   Refills:  0       testosterone cypionate 200 MG/ML injection   Commonly known as:  DEPOTESTOTERONE        Dose:  100 mg   Inject 100 mg into the muscle See Admin Instructions Every 2-3 weeks.   Refills:  0       VITAMIN D (CHOLECALCIFEROL) PO        Dose:  2000 Units   Take 2,000 Units by mouth daily   Refills:  0       * Notice:  This list has 2 medication(s) that are the same as other medications prescribed for you. Read the directions carefully, and ask your doctor or other care provider to review them with you.              Protect others around you: Learn how to safely use, store and throw away your medicines at www.disposemymeds.org.         Follow-ups after your visit         Care Instructions        Further instructions from your care team       G-tube Exchange Discharge Instructions     After you go home:      You may resume your normal diet    Care of Insertion Site:      For the first 48 hrs, check your puncture site every couple hours while you are awake     You may remove/change the dressing tomorrow    You may shower tomorrow    No tub baths, whirlpools or swimming until your puncture site has fully healed     Activity       You may go back to normal activity in 24 hours    Wait 48 hours before lifting, straining, exercise or other strenuous activity    Medicines:      You may resume all medications    Resume your Warfarin/Coumadin at your regular dose today. Follow up with your provider to have your INR rechecked    Resume your Platelet Inhibitors and Aspirin tomorrow at your regular dose    For minor pain, you may take Acetaminophen (Tylenol) or Ibuprofen (Advil)                 Call the provider who ordered this procedure if:      Blood or fluid is draining from the site    The site is red, swollen, hot, tender or there is foul-smelling drainage    Chills or a fever greater than 101 F (38 C)    Increased pain at the site    Any questions or concerns    Call  911 or go to the Emergency Room if:      Severe pain or trouble breathing    Bleeding that you cannot control      If you have questions call:          [facility] Radiology Dept @ [phone]        The provider who performed your procedure was _________________.        Caring for your G-tube    Tube Maintenance:    Possible problems with your tube may include:      Clogged with medications or feedings - most obstructions can be cleared with a small (3cc) syringe and warm water. This may be repeated until the tube is unclogged. This can be prevented  by frequently flushing the tube with water (60cc) during the day and always after medications & feedings.      Tube pulls out or falls out -cover the opening with gauze & tape. Call [phone] for further instructions      Skin breakdown and/or yeast infections at the insertion site - use of skin barrier ointments and anti-fungal powders can treat most site irritations.  Ask your pharmacist or provider for assistance (a prescription is not necessary).    In general, tube problems (including pulled tubes) are NOT emergency situations. Unless the pulling out of a tube is accompanied by uncontrollable bleeding, please DO NOT GO TO THE EMERGENCY ROOM!  Call [phone] with problems.    Tube Care:      Change the gauze dressing every 24 hours and if soiled (dirty).  Stabilize all tubes securely by using gauze and tape.  Clean tube site with soap & water using a cotton applicator (Q-tip) as needed to prevent irritation.    Flush feeding tube with 60cc of warm or room temperature water before and after meds.  To prevent the tube from clogging, ask your provider or pharmacist if liquid forms of your medications are available. If not, crush the pills well & be sure to flush the tube before & after all medications.    Flush feeding tube a minimum of every 4 hours and when feeding is completed with 60cc of water to keep the tube clear and avoid clogging.    Pt may use an abdominal (waist) binder to protect the G-tube.    If there is continued oozing or bleeding, redness, yellow/green/foul smelling drainage    STOP the feedings & use of the tube immediately if there is:      Continued oozing or bleeding at the site    Redness    Yellow/green or foul smelling drainage at the site    Uncontrolled stomach pain    Many of the supplies mentioned above can be purchased at your local pharmacy      For issues with your tube, please call:    [facility] at [phone]               Additional Information  "About Your Visit        [employer] Information     [employer] lets you send messages to your doctor, view your test results, renew your prescriptions, schedule appointments and more. To sign up, go to [email or web address] . Click on \"Log in\" on the left side of the screen, which will take you to the Welcome page. Then click on \"Sign up Now\" on the right side of the page.     You will be asked to enter the access code listed below, as well as some personal information. Please follow the directions to create your username and password.     Your access code is: [email or web address]  Expires: 2018  2:31 PM     Your access code will  in 90 days. If you need help or a new code, please call your [facility] clinic or [phone].        Care EveryWhere ID     This is your Care EveryWhere ID. This could be used by other organizations to access your [facility] medical records  [id number]        Your Vitals Were     Blood Pressure Temperature Respirations Height Weight Pulse Oximetry    106/63 (BP Location: Left arm) 96.6  F (35.9  C) (Oral) 16 1.829 m (6') 70.3 kg (155 lb) 98%    BMI (Body Mass Index)                   21.02 kg/m2            Primary Care Provider Office Phone # Fax #    Carlos Gomez [phone]      Equal Access to Services     [facility]: Hadii timi grijalva hadkristeno Sonikunj, waaxda luqadaha, qaybta kaalmalynn sofia, yaw [place]land . So [facility] [phone].    ATENCIÓN: Si habla español, tiene a king disposición servicios gratuitos de asistencia lingüística. Marsha al [phone].    We comply with applicable federal civil rights laws and [place] laws. We do not discriminate on the basis of race, color, national origin, age, disability, sex, sexual orientation, or gender identity.            Thank you!     Thank you for choosing [facility] for your care. Our goal is always to provide you with excellent care. Hearing back from our patients is one way we can continue to " improve our services. Please take a few minutes to complete the written survey that you may receive in the mail after you visit with us. Thank you!             Medication List: This is a list of all your medications and when to take them. Check marks below indicate your daily home schedule. Keep this list as a reference.      Medications           Morning Afternoon Evening Bedtime As Needed    ACETAMINOPHEN PO   650 mg by Per Feeding Tube route every 4 hours as needed for pain                                albuterol (2.5 MG/3ML) 0.083% neb solution   Take 1 vial (2.5 mg) by nebulization every 4 hours as needed for shortness of breath / dyspnea                                aspirin 10 mg/mL Susp   8.1 mLs (81 mg) by Per J Tube route daily                                bacitracin ointment   Apply topically daily as needed for wound care To PEG site.                                bisacodyl 10 MG Suppository   Commonly known as:  DULCOLAX   Place 1 suppository (10 mg) rectally daily as needed for constipation                                BRIVIACT 10 MG/ML solution   Take 100 mg by mouth 2 times daily   Generic drug:  Brivaracetam                                calcium carbonate 1250 (500 CA) MG/5ML Susp suspension   5 mLs (1,250 mg) by Per J Tube route 3 times daily (with meals)                                carBAMazepine 100 MG/5ML suspension   Commonly known as:  TEGretol   Take 150 mg by mouth every 6 hours                                fiber modular packet   1 packet by Per Feeding Tube route 3 times daily                                * hydrocortisone 2 mg/mL Susp   Commonly known as:  CORTEF   10 mLs (20 mg) by Per J Tube route every morning                                * hydrocortisone 2 mg/mL Susp   Commonly known as:  CORTEF   Take 5 mLs (10 mg) by mouth every evening                                levothyroxine 25 mcg/mL Susp   Commonly known as:  SYNTHROID   6 mLs (150 mcg) by Per J Tube route  every morning (before breakfast)                                melatonin 1 MG/ML Liqd liquid   6 mg by Jejunal Tube route nightly as needed for sleep                                miconazole 2 % powder   Commonly known as:  MICATIN; MICRO GUARD   Apply topically every hour as needed for other (topical candidiasis)                                multivitamins with minerals Liqd liquid   15 mLs by Per J Tube route daily                                nystatin [id number] UNIT/GM Powd   Commonly known as:  MYCOSTATIN   Apply three times daily to the groin for 5 days.                                pantoprazole Susp suspension   Commonly known as:  PROTONIX   Take 20 mg by mouth daily                                potassium & sodium phosphates 280-160-250 MG Packet   Commonly known as:  NEUTRA-PHOS   Take 1 packet by mouth 3 times daily 0900, 1500, 2100.                                testosterone cypionate 200 MG/ML injection   Commonly known as:  DEPOTESTOTERONE   Inject 100 mg into the muscle See Admin Instructions Every 2-3 weeks.                                VITAMIN D (CHOLECALCIFEROL) PO   Take 2,000 Units by mouth daily                                * Notice:  This list has 2 medication(s) that are the same as other medications prescribed for you. Read the directions carefully, and ask your doctor or other care provider to review them with you.

## 2018-03-13 NOTE — PROCEDURES
RADIOLOGY PROCEDURE NOTE  Patient name: Keyon Farias  MRN: [id number]  : 1960    Pre-procedure diagnosis: GJ tube exchange  Post-procedure diagnosis: Same    Procedure Date/Time: 2018  4:02 PM  Procedure: GJ tube exchange.  No complications.  Tolerated well.  Ready for use.  Estimated blood loss: < 5 ml  Specimen(s) collected with description: GJ tube exchange  The patient tolerated the procedure well with no immediate complications.  Significant findings:  Please see above.    See imaging dictation for procedural details.    Provider name: Brandon Villafana  Assistant(s):None

## 2018-03-31 ENCOUNTER — APPOINTMENT (OUTPATIENT)
Dept: GENERAL RADIOLOGY | Facility: CLINIC | Age: 58
DRG: 872 | End: 2018-03-31
Attending: EMERGENCY MEDICINE
Payer: MEDICARE

## 2018-03-31 ENCOUNTER — HOSPITAL ENCOUNTER (INPATIENT)
Facility: CLINIC | Age: 58
LOS: 3 days | Discharge: HOME HEALTH | DRG: 872 | End: 2018-04-04
Attending: EMERGENCY MEDICINE | Admitting: HOSPITALIST
Payer: MEDICARE

## 2018-03-31 DIAGNOSIS — A41.9 SEPSIS, DUE TO UNSPECIFIED ORGANISM: Primary | ICD-10-CM

## 2018-03-31 DIAGNOSIS — R65.20 SEVERE SEPSIS (H): ICD-10-CM

## 2018-03-31 DIAGNOSIS — A41.9 SEVERE SEPSIS (H): ICD-10-CM

## 2018-03-31 DIAGNOSIS — R50.81 FEVER IN OTHER DISEASES: ICD-10-CM

## 2018-03-31 LAB
ALBUMIN SERPL-MCNC: 3.6 G/DL (ref 3.4–5)
ALBUMIN UR-MCNC: 10 MG/DL
ALP SERPL-CCNC: 109 U/L (ref 40–150)
ALT SERPL W P-5'-P-CCNC: 26 U/L (ref 0–70)
ANION GAP SERPL CALCULATED.3IONS-SCNC: 6 MMOL/L (ref 3–14)
APPEARANCE UR: CLEAR
AST SERPL W P-5'-P-CCNC: 19 U/L (ref 0–45)
BASOPHILS # BLD AUTO: 0.1 10E9/L (ref 0–0.2)
BASOPHILS NFR BLD AUTO: 0.4 %
BILIRUB SERPL-MCNC: 0.3 MG/DL (ref 0.2–1.3)
BILIRUB UR QL STRIP: NEGATIVE
BUN SERPL-MCNC: 16 MG/DL (ref 7–30)
CALCIUM SERPL-MCNC: 9 MG/DL (ref 8.5–10.1)
CHLORIDE SERPL-SCNC: 95 MMOL/L (ref 94–109)
CO2 SERPL-SCNC: 31 MMOL/L (ref 20–32)
COLOR UR AUTO: YELLOW
CREAT SERPL-MCNC: 0.95 MG/DL (ref 0.66–1.25)
DIFFERENTIAL METHOD BLD: ABNORMAL
EOSINOPHIL # BLD AUTO: 0.8 10E9/L (ref 0–0.7)
EOSINOPHIL NFR BLD AUTO: 5.4 %
ERYTHROCYTE [DISTWIDTH] IN BLOOD BY AUTOMATED COUNT: 13.7 % (ref 10–15)
FLUAV+FLUBV AG SPEC QL: NEGATIVE
FLUAV+FLUBV AG SPEC QL: NEGATIVE
GFR SERPL CREATININE-BSD FRML MDRD: 82 ML/MIN/1.7M2
GLUCOSE SERPL-MCNC: 92 MG/DL (ref 70–99)
GLUCOSE UR STRIP-MCNC: NEGATIVE MG/DL
HCT VFR BLD AUTO: 39.9 % (ref 40–53)
HGB BLD-MCNC: 13.5 G/DL (ref 13.3–17.7)
HGB UR QL STRIP: NEGATIVE
IMM GRANULOCYTES # BLD: 0 10E9/L (ref 0–0.4)
IMM GRANULOCYTES NFR BLD: 0.3 %
KETONES UR STRIP-MCNC: NEGATIVE MG/DL
LACTATE BLD-SCNC: 2.7 MMOL/L (ref 0.7–2)
LEUKOCYTE ESTERASE UR QL STRIP: NEGATIVE
LYMPHOCYTES # BLD AUTO: 2.9 10E9/L (ref 0.8–5.3)
LYMPHOCYTES NFR BLD AUTO: 20.9 %
MCH RBC QN AUTO: 30.1 PG (ref 26.5–33)
MCHC RBC AUTO-ENTMCNC: 33.8 G/DL (ref 31.5–36.5)
MCV RBC AUTO: 89 FL (ref 78–100)
MONOCYTES # BLD AUTO: 1.1 10E9/L (ref 0–1.3)
MONOCYTES NFR BLD AUTO: 8.2 %
MUCOUS THREADS #/AREA URNS LPF: PRESENT /LPF
NEUTROPHILS # BLD AUTO: 9 10E9/L (ref 1.6–8.3)
NEUTROPHILS NFR BLD AUTO: 64.8 %
NITRATE UR QL: NEGATIVE
NRBC # BLD AUTO: 0 10*3/UL
NRBC BLD AUTO-RTO: 0 /100
PH UR STRIP: 8 PH (ref 5–7)
PLATELET # BLD AUTO: 137 10E9/L (ref 150–450)
POTASSIUM SERPL-SCNC: 4.4 MMOL/L (ref 3.4–5.3)
PROCALCITONIN SERPL-MCNC: <0.05 NG/ML
PROT SERPL-MCNC: 8.4 G/DL (ref 6.8–8.8)
RBC # BLD AUTO: 4.48 10E12/L (ref 4.4–5.9)
RBC #/AREA URNS AUTO: 1 /HPF (ref 0–2)
SODIUM SERPL-SCNC: 132 MMOL/L (ref 133–144)
SOURCE: ABNORMAL
SP GR UR STRIP: 1.02 (ref 1–1.03)
SPECIMEN SOURCE: NORMAL
UROBILINOGEN UR STRIP-MCNC: NORMAL MG/DL (ref 0–2)
WBC # BLD AUTO: 13.8 10E9/L (ref 4–11)
WBC #/AREA URNS AUTO: <1 /HPF (ref 0–5)

## 2018-03-31 PROCEDURE — 87040 BLOOD CULTURE FOR BACTERIA: CPT | Performed by: EMERGENCY MEDICINE

## 2018-03-31 PROCEDURE — 99285 EMERGENCY DEPT VISIT HI MDM: CPT | Mod: 25

## 2018-03-31 PROCEDURE — 96365 THER/PROPH/DIAG IV INF INIT: CPT

## 2018-03-31 PROCEDURE — 96367 TX/PROPH/DG ADDL SEQ IV INF: CPT

## 2018-03-31 PROCEDURE — 87804 INFLUENZA ASSAY W/OPTIC: CPT | Performed by: EMERGENCY MEDICINE

## 2018-03-31 PROCEDURE — 83605 ASSAY OF LACTIC ACID: CPT | Performed by: EMERGENCY MEDICINE

## 2018-03-31 PROCEDURE — A9270 NON-COVERED ITEM OR SERVICE: HCPCS | Mod: GY | Performed by: EMERGENCY MEDICINE

## 2018-03-31 PROCEDURE — 84145 PROCALCITONIN (PCT): CPT | Performed by: EMERGENCY MEDICINE

## 2018-03-31 PROCEDURE — 87086 URINE CULTURE/COLONY COUNT: CPT | Performed by: EMERGENCY MEDICINE

## 2018-03-31 PROCEDURE — 25000132 ZZH RX MED GY IP 250 OP 250 PS 637: Mod: GY | Performed by: EMERGENCY MEDICINE

## 2018-03-31 PROCEDURE — 81001 URINALYSIS AUTO W/SCOPE: CPT | Performed by: EMERGENCY MEDICINE

## 2018-03-31 PROCEDURE — 96361 HYDRATE IV INFUSION ADD-ON: CPT

## 2018-03-31 PROCEDURE — 87631 RESP VIRUS 3-5 TARGETS: CPT | Performed by: EMERGENCY MEDICINE

## 2018-03-31 PROCEDURE — 80053 COMPREHEN METABOLIC PANEL: CPT | Performed by: EMERGENCY MEDICINE

## 2018-03-31 PROCEDURE — 71046 X-RAY EXAM CHEST 2 VIEWS: CPT

## 2018-03-31 PROCEDURE — 85025 COMPLETE CBC W/AUTO DIFF WBC: CPT | Performed by: EMERGENCY MEDICINE

## 2018-03-31 PROCEDURE — 99223 1ST HOSP IP/OBS HIGH 75: CPT | Mod: AI | Performed by: HOSPITALIST

## 2018-03-31 PROCEDURE — 25000128 H RX IP 250 OP 636: Performed by: EMERGENCY MEDICINE

## 2018-03-31 RX ORDER — SODIUM CHLORIDE, SODIUM LACTATE, POTASSIUM CHLORIDE, CALCIUM CHLORIDE 600; 310; 30; 20 MG/100ML; MG/100ML; MG/100ML; MG/100ML
INJECTION, SOLUTION INTRAVENOUS CONTINUOUS
Status: DISCONTINUED | OUTPATIENT
Start: 2018-03-31 | End: 2018-04-01

## 2018-03-31 RX ORDER — PIPERACILLIN SODIUM, TAZOBACTAM SODIUM 4; .5 G/20ML; G/20ML
4.5 INJECTION, POWDER, LYOPHILIZED, FOR SOLUTION INTRAVENOUS ONCE
Status: COMPLETED | OUTPATIENT
Start: 2018-03-31 | End: 2018-03-31

## 2018-03-31 RX ADMIN — ACETAMINOPHEN 650 MG: 160 SUSPENSION ORAL at 22:05

## 2018-03-31 RX ADMIN — PIPERACILLIN SODIUM,TAZOBACTAM SODIUM 4.5 G: 4; .5 INJECTION, POWDER, FOR SOLUTION INTRAVENOUS at 22:39

## 2018-03-31 RX ADMIN — VANCOMYCIN HYDROCHLORIDE 1500 MG: 5 INJECTION, POWDER, LYOPHILIZED, FOR SOLUTION INTRAVENOUS at 23:35

## 2018-03-31 RX ADMIN — SODIUM CHLORIDE, POTASSIUM CHLORIDE, SODIUM LACTATE AND CALCIUM CHLORIDE 2190 ML: 600; 310; 30; 20 INJECTION, SOLUTION INTRAVENOUS at 22:03

## 2018-03-31 NOTE — IP AVS SNAPSHOT
[place] [address] [facility]: [phone]                                              INTERAGENCY TRANSFER FORM - PHYSICIAN ORDERS   3/31/2018                    Hospital Admission Date: 3/31/2018  BERT BARAJAS   : 1960  Sex: Male        Attending Provider: Rupali Costa MD     Allergies:  Dilantin [Phenytoin Sodium], Valproic Acid    Infection:  MRSA, VRE   Service:  HOSPITALIST    Ht:  1.829 m (6')   Wt:  76.1 kg (167 lb 12.3 oz)   Admission Wt:  73 kg (161 lb)    BMI:  22.75 kg/m 2   BSA:  1.97 m 2            Patient PCP Information     Provider PCP Type    Carlos Gomez MD General      ED Clinical Impression     Diagnosis Description Comment Added By Time Added    Severe sepsis (H) [A41.9, R65.20] Severe sepsis (H) [A41.9, R65.20]  Megan Carbone MD 3/31/2018 11:09 PM    Fever in other diseases [R50.81] Fever in other diseases [R50.81]  Megan Carbone MD 3/31/2018 11:09 PM      Hospital Problems as of 2018              Priority Class Noted POA    Sepsis (H) Medium  11/15/2015 Yes      Non-Hospital Problems as of 2018              Priority Class Noted    Appendicitis Medium  2013    Panhypopituitarism (H) Medium  Unknown    Hematemesis Medium  3/13/2014    Seizure (H) Medium  2014    Seizures (H) Medium  10/4/2014    Recurrent seizures (H) Medium  3/8/2015    Septic shock (H) Medium  2016    Pneumonia of both lower lobes due to infectious organism Medium  2016    Community acquired pneumonia Medium  2016    Breakthrough seizure (H) Medium  2016    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  2016    Hyponatremia Medium  2016    PEG tube malfunction (H) Medium  10/31/2016    Pneumonia Medium  2016    Cardiac arrest (H) Medium  2016    Acute respiratory failure with hypoxia (H) Medium  2016    Necrotizing pancreatitis Medium  2017    Pneumonia, aspiration (H) Medium   6/5/2017    Encephalopathy Medium  6/6/2017    Fever Medium  12/26/2017      Code Status History     Date Active Date Inactive Code Status Order ID Comments User Context    4/4/2018  9:53 AM  Full Code [id number]  Migel Stoddard MD Outpatient    4/1/2018 12:47 AM 4/4/2018  9:53 AM Full Code [id number]  Rupali Costa MD Inpatient    2/21/2018 11:38 AM 4/1/2018 12:47 AM Full Code [id number]  Yogi Irizarry MD Outpatient    2/14/2018 12:36 PM 2/21/2018 11:38 AM Full Code [id number]  Demarco Ash MD Inpatient    12/31/2017 12:16 PM 2/14/2018 12:36 PM Full Code [id number]  Jossie Higgins MD Outpatient    12/26/2017  4:20 PM 12/31/2017 12:16 PM Full Code [id number]  Lakia Kellogg PA-C Inpatient    10/7/2017 10:09 AM 12/26/2017  4:20 PM Full Code [id number]  Migel Stoddard MD Outpatient    10/4/2017  1:08 AM 10/7/2017 10:09 AM Full Code [id number]  Manish Meier MD Inpatient    6/7/2017 10:05 AM 10/4/2017  1:08 AM Full Code [id number]  Hayden Epperson DO Outpatient    6/5/2017  1:07 AM 6/7/2017 10:05 AM Full Code [id number]  Manish Motta MD Inpatient    1/23/2017  6:04 PM 2/10/2017  3:21 PM Full Code [id number]  Yoel Montgomery MD Inpatient    1/23/2017 12:35 PM 1/23/2017  6:04 PM Full Code [id number]  Shaneka Clark PA-C Outpatient    1/21/2017  6:33 PM 1/23/2017 12:35 PM Full Code [id number]  Marquez Green MD Inpatient    12/9/2016 10:25 AM 1/21/2017  6:33 PM Full Code [id number]  Elen Carrillo MD Outpatient    12/8/2016 11:30 AM 12/9/2016 10:25 AM Full Code [id number]  Alicia Roca APRN CNP Outpatient    11/23/2016  6:52 PM 12/8/2016 11:30 AM Full Code [id number]  Shea Wei MD Inpatient    10/31/2016  1:24 PM 11/23/2016  6:52 PM Full Code [id number]  Bindu Barillas PA-C Outpatient    10/31/2016  4:53 AM 10/31/2016  1:24 PM Full Code [id number]  Regino Valenzuela MD ED    9/9/2016  7:48 AM 10/31/2016  4:53 AM Full Code [id number]   Shea Xiong MD Outpatient    8/23/2016  9:05 PM 9/9/2016  7:48 AM Full Code [id number]  Adonay Fernandez MD Inpatient    7/24/2016 12:45 PM 7/25/2016  7:34 PM Full Code [id number]  Jossie Higgins MD Inpatient    1/6/2016  2:05 PM 7/24/2016 12:45 PM Full Code [id number]  Romero Zamarripa MD Outpatient    1/5/2016 12:18 AM 1/6/2016  2:05 PM Full Code [id number]  Israel Christianson MD Inpatient    11/18/2015 11:13 AM 1/5/2016 12:18 AM Full Code [id number]  Starr Champion MD Outpatient    11/15/2015 11:37 AM 11/18/2015 11:13 AM Full Code [id number]  Abhi Cruz MD Inpatient    3/9/2015 10:35 AM 11/15/2015 11:37 AM Full Code [id number]  Danyelle Pelayo MD Outpatient    7/5/2014 11:32 AM 3/9/2015 10:35 AM Full Code [id number]  Shea Phillips MD Outpatient    7/4/2014 11:49 AM 7/5/2014 11:32 AM Full Code [id number]  Starr Champion MD ED    3/14/2014  2:01 PM 7/4/2014 11:49 AM Full Code [id number]  Starr Champion MD Outpatient    3/13/2014  6:06 PM 3/14/2014  2:01 PM Full Code [id number]  Carrie Hanson RN Inpatient    7/30/2013  9:12 PM 8/1/2013  4:35 PM Full Code [id number]  Sharon Zeng, RN Inpatient         Medication Review      START taking        Dose / Directions Comments    amoxicillin-clavulanate 400-57 MG/5ML suspension   Commonly known as:  AUGMENTIN   Indication:  Infection with Dysregulated Inflammatory Response        Dose:  600 mg   Take 7.5 mLs (600 mg) by mouth every 12 hours for 3 days   Quantity:  45 mL   Refills:  0          CONTINUE these medications which may have CHANGED, or have new prescriptions. If we are uncertain of the size of tablets/capsules you have at home, strength may be listed as something that might have changed.        Dose / Directions Comments    levothyroxine 25 mcg/mL Susp   Commonly known as:  SYNTHROID   This may have changed:  additional instructions   Used for:  Panhypopituitarism (H)        Dose:  150 mcg   6  mLs (150 mcg) by Per J Tube route every morning (before breakfast)   Refills:  0          CONTINUE these medications which have NOT CHANGED        Dose / Directions Comments    ACETAMINOPHEN PO        Dose:  650 mg   650 mg by Per Feeding Tube route every 4 hours as needed for pain   Refills:  0        * albuterol (2.5 MG/3ML) 0.083% neb solution        Dose:  1 vial   Take 1 vial by nebulization every 4 hours as needed for shortness of breath / dyspnea or wheezing   Refills:  0        * albuterol (2.5 MG/3ML) 0.083% neb solution        Dose:  1 vial   Take 1 vial (2.5 mg) by nebulization every 4 hours as needed for shortness of breath / dyspnea   Quantity:  1 Box   Refills:  0        aspirin 10 mg/mL Susp   Used for:  Routine adult health maintenance        Dose:  81 mg   8.1 mLs (81 mg) by Per J Tube route daily   Refills:  0        bacitracin ointment        Apply topically daily as needed for wound care To PEG site.   Refills:  0        bisacodyl 10 MG Suppository   Commonly known as:  DULCOLAX   Used for:  Constipation, unspecified constipation type        Dose:  10 mg   Place 1 suppository (10 mg) rectally daily as needed for constipation   Quantity:  30 suppository   Refills:  0        BRIVIACT 10 MG/ML solution   Generic drug:  Brivaracetam        Dose:  100 mg   Take 100 mg by mouth 2 times daily   Refills:  0        calcium carbonate 1250 (500 CA) MG/5ML Susp suspension   Used for:  Malnutrition (H)        Dose:  1250 mg   5 mLs (1,250 mg) by Per J Tube route 3 times daily (with meals)   Quantity:  450 mL   Refills:  0        carBAMazepine 100 MG/5ML suspension   Commonly known as:  TEGretol        Dose:  150 mg   Take 150 mg by mouth every 6 hours   Refills:  0        fiber modular packet   Used for:  Necrotizing pancreatitis        Dose:  1 packet   1 packet by Per Feeding Tube route 3 times daily   Refills:  0        * hydrocortisone 2 mg/mL Susp   Commonly known as:  CORTEF   Used for:   Panhypopituitarism (H)        Dose:  20 mg   10 mLs (20 mg) by Per J Tube route every morning   Refills:  0        * hydrocortisone 2 mg/mL Susp   Commonly known as:  CORTEF   Used for:  Panhypopituitarism (H)        Dose:  10 mg   Take 5 mLs (10 mg) by mouth every evening   Refills:  0        melatonin 1 MG/ML Liqd liquid        Dose:  6 mg   6 mg by Jejunal Tube route nightly as needed for sleep   Refills:  0        miconazole 2 % powder   Commonly known as:  MICATIN; MICRO GUARD   Used for:  Rash        Apply topically every hour as needed for other (topical candidiasis)   Refills:  0        multivitamins with minerals Liqd liquid   Used for:  Necrotizing pancreatitis, Malnutrition (H)        Dose:  15 mL   15 mLs by Per J Tube route daily   Refills:  0        nystatin [id number] UNIT/GM Powd   Commonly known as:  MYCOSTATIN   Used for:  Candidiasis of skin        Apply three times daily to the groin for 5 days.   Quantity:  15 g   Refills:  0        pantoprazole Susp suspension   Commonly known as:  PROTONIX        Dose:  20 mg   Take 20 mg by mouth daily   Refills:  0        potassium & sodium phosphates 280-160-250 MG Packet   Commonly known as:  NEUTRA-PHOS        Dose:  1 packet   Take 1 packet by mouth 3 times daily 0900, 1500, 2100.   Refills:  0        testosterone cypionate 200 MG/ML injection   Commonly known as:  DEPOTESTOTERONE        Dose:  100 mg   Inject 100 mg into the muscle See Admin Instructions Every 2-3 weeks.   Refills:  0        VITAMIN D (CHOLECALCIFEROL) PO        Dose:  2000 Units   Take 2,000 Units by mouth daily   Refills:  0        * Notice:  This list has 4 medication(s) that are the same as other medications prescribed for you. Read the directions carefully, and ask your doctor or other care provider to review them with you.            Summary of Visit     Reason for your hospital stay       Sepsis; unclear source             After Care     Activity       Your activity upon discharge:  activity as tolerated       Diet       Follow this diet upon discharge: Orders Placed This Encounter      Adult Formula Drip Feeding: Continuous Isosource 1.5; Jejunostomy; Goal Rate: 60; mL/hr; Medication - Tube Feeding Flush Frequency: At least 15-30 mL water before and after medication administration and with tube clogging; Amout to Send (Nutrition...      NPO for Medical/Clinical Reasons Except for: No Exceptions               Referrals     Home care nursing referral       Resume all previous home care and pca services-pt has tube fdg  Your provider has ordered home care nursing services. If you have not been contacted within 2 days of your discharge please call the inpatient department phone number at [phone] .             Your next 10 appointments already scheduled     Apr 17, 2018  1:30 PM CDT   XR VIDEO SPEECH EVALUATION WITH ESOPHAGRAM with SHXR5   [facility] ([facility])    [address] [place] [id number] [phone]           Please bring a list of your current medicines to your exam. (Include vitamins, minerals and over-the-counter medicines.) Leave your valuables at home.  Tell the doctor if there is a chance you could be pregnant.  Do not eat for 4 hours before the exam. Keep drinking clear liquids until 2 hours before the exam.  You may take pain medicine (with a sip of water) up to 4 hours before the exam.  Do not swallow any other medicines unless your doctor tells you to. Talk to your doctor to be sure it s safe to stop your medicines.  Please call the Imaging Department at your exam site with any questions.            Apr 17, 2018  1:30 PM CDT   Video Swallow with SH SLP OP VFSS   [facility] ([facility])    [facility] [address], [address] [address] [phone]              Follow-Up Appointment Instructions     Future Labs/Procedures    Follow-up and recommended labs and tests     Comments:     Follow up with primary care provider, Carlos Gomez, within 7 days for hospital follow- up.      Follow-Up Appointment Instructions     Follow-up and recommended labs and tests       Follow up with primary care provider, Carlos Gomez, within 7 days for hospital follow- up.             Statement of Approval     Ordered          04/04/18 0953  I have reviewed and agree with all the recommendations and orders detailed in this document.  EFFECTIVE NOW     Approved and electronically signed by:  Migel Stoddard MD

## 2018-03-31 NOTE — IP AVS SNAPSHOT
[place] [address] [facility]    [address] [place] [address]    Phone:  [phone]                                       After Visit Summary   3/31/2018    Keyon Farias    MRN: [id number]           After Visit Summary Signature Page     I have received my discharge instructions, and my questions have been answered. I have discussed any challenges I see with this plan with the nurse or doctor.    ..........................................................................................................................................  Patient/Patient Representative Signature      ..........................................................................................................................................  Patient Representative Print Name and Relationship to Patient    ..................................................               ................................................  Date                                            Time    ..........................................................................................................................................  Reviewed by Signature/Title    ...................................................              ..............................................  Date                                                            Time

## 2018-03-31 NOTE — IP AVS SNAPSHOT
MRN:[id number]                      After Visit Summary   3/31/2018    Keyon Farias    MRN: [id number]           Thank you!     Thank you for choosing [facility] for your care. Our goal is always to provide you with excellent care. Hearing back from our patients is one way we can continue to improve our services. Please take a few minutes to complete the written survey that you may receive in the mail after you visit with us. Thank you!        Patient Information     Date Of Birth          1960        Designated Caregiver       Most Recent Value    Caregiver    Will someone help with your care after discharge? yes    Name of designated caregiver Savannah    Phone number of caregiver     Caregiver address same as pt      About your hospital stay     You were admitted on:  April 1, 2018 You last received care in the:  [place] [address] [facility]    You were discharged on:  April 4, 2018        Reason for your hospital stay       Sepsis; unclear source                  Who to Call     For medical emergencies, please call 911.  For non-urgent questions about your medical care, please call your primary care provider or clinic, [phone]          Attending Provider     Provider Specialty    Megan Carbone MD Emergency Medicine    [facility]       Primary Care Provider Office Phone # Fax #    Carlos Gomez [phone]      After Care Instructions     Activity       Your activity upon discharge: activity as tolerated            Diet       Follow this diet upon discharge: Orders Placed This Encounter      Adult Formula Drip Feeding: Continuous Isosource 1.5; Jejunostomy; Goal Rate: 60; mL/hr; Medication - Tube Feeding Flush Frequency: At least 15-30 mL water before and after medication administration and with tube clogging; Amout to Send (Nutrition...      NPO for Medical/Clinical Reasons Except for: No Exceptions                     Follow-up Appointments     Follow-up and recommended labs and tests       Follow up with primary care provider, Carlos Gomez, within 7 days for hospital follow- up.                  Your next 10 appointments already scheduled     Apr 17, 2018  1:30 PM CDT   XR VIDEO SPEECH EVALUATION WITH ESOPHAGRAM with SHXR5   [facility] ([facility])    [address] [id number] [phone]           Please bring a list of your current medicines to your exam. (Include vitamins, minerals and over-the-counter medicines.) Leave your valuables at home.  Tell the doctor if there is a chance you could be pregnant.  Do not eat for 4 hours before the exam. Keep drinking clear liquids until 2 hours before the exam.  You may take pain medicine (with a sip of water) up to 4 hours before the exam.  Do not swallow any other medicines unless your doctor tells you to. Talk to your doctor to be sure it s safe to stop your medicines.  Please call the Imaging Department at your exam site with any questions.            Apr 17, 2018  1:30 PM CDT   Video Swallow with SH SLP OP VFSS   [facility] Speech Therapy ([facility])    [address] [place], [place] [address], [address] [address] [phone]              Additional Services     Home care nursing referral       Resume all previous home care and pca services-pt has tube fdg  Your provider has ordered home care nursing services. If you have not been contacted within 2 days of your discharge please call the inpatient department phone number at [phone] .                  Further instructions from your care team       Patient has clothing, ring and wheelchair - sent home with patient at discharge.    Pending Results     Date and Time Order Name Status Description    3/31/2018 2153 Blood culture Preliminary     3/31/2018 2153 Blood culture Preliminary             Statement of Approval     Ordered           "[id number]  I have reviewed and agree with all the recommendations and orders detailed in this document.  EFFECTIVE NOW     Approved and electronically signed by:  Migel Stoddard MD             Admission Information     Date & Time Provider Department Dept. Phone    3/31/2018 Rupali Costa MD [place] [address] [facility] [phone]      Your Vitals Were     Blood Pressure Pulse Temperature Respirations Weight Pulse Oximetry    107/60 (BP Location: Left arm) 60 97.7  F (36.5  C) (Oral) 16 76.1 kg (167 lb 12.3 oz) 97%    BMI (Body Mass Index)                   22.75 kg/m2           MyChart Information     "[employer]" lets you send messages to your doctor, view your test results, renew your prescriptions, schedule appointments and more. To sign up, go to [email or web address]" . Click on \"Log in\" on the left side of the screen, which will take you to the Welcome page. Then click on \"Sign up Now\" on the right side of the page.     You will be asked to enter the access code listed below, as well as some personal information. Please follow the directions to create your username and password.     Your access code is: [email or web address]  Expires: 2018  2:31 PM     Your access code will  in 90 days. If you need help or a new code, please call your [place] clinic or [phone].        Care EveryWhere ID     This is your Care EveryWhere ID. This could be used by other organizations to access your [facility] medical records  [id number]        Equal Access to Services     [facility]: Hadii timi styles Sonikunj, waaxda luqadaha, qaybta kaalmayaw guzman . So [facility] [phone].    ATENCIÓN: Si habla español, tiene a king disposición servicios gratuitos de asistencia lingüística. Llame al [phone].    We comply with applicable federal civil rights laws and [place] laws. We do not discriminate on the basis of race, color, national origin, age, " disability, sex, sexual orientation, or gender identity.               Review of your medicines      START taking        Dose / Directions    amoxicillin-clavulanate 400-57 MG/5ML suspension   Commonly known as:  AUGMENTIN   Indication:  Infection with Dysregulated Inflammatory Response        Dose:  600 mg   Take 7.5 mLs (600 mg) by mouth every 12 hours for 3 days   Quantity:  45 mL   Refills:  0         CONTINUE these medicines which may have CHANGED, or have new prescriptions. If we are uncertain of the size of tablets/capsules you have at home, strength may be listed as something that might have changed.        Dose / Directions    levothyroxine 25 mcg/mL Susp   Commonly known as:  SYNTHROID   This may have changed:  additional instructions   Used for:  Panhypopituitarism (H)        Dose:  150 mcg   6 mLs (150 mcg) by Per J Tube route every morning (before breakfast)   Refills:  0         CONTINUE these medicines which have NOT CHANGED        Dose / Directions    ACETAMINOPHEN PO        Dose:  650 mg   650 mg by Per Feeding Tube route every 4 hours as needed for pain   Refills:  0       * albuterol (2.5 MG/3ML) 0.083% neb solution        Dose:  1 vial   Take 1 vial by nebulization every 4 hours as needed for shortness of breath / dyspnea or wheezing   Refills:  0       * albuterol (2.5 MG/3ML) 0.083% neb solution        Dose:  1 vial   Take 1 vial (2.5 mg) by nebulization every 4 hours as needed for shortness of breath / dyspnea   Quantity:  1 Box   Refills:  0       aspirin 10 mg/mL Susp   Used for:  Routine adult health maintenance        Dose:  81 mg   8.1 mLs (81 mg) by Per J Tube route daily   Refills:  0       bacitracin ointment        Apply topically daily as needed for wound care To PEG site.   Refills:  0       bisacodyl 10 MG Suppository   Commonly known as:  DULCOLAX   Used for:  Constipation, unspecified constipation type        Dose:  10 mg   Place 1 suppository (10 mg) rectally daily as needed for  constipation   Quantity:  30 suppository   Refills:  0       BRIVIACT 10 MG/ML solution   Generic drug:  Brivaracetam        Dose:  100 mg   Take 100 mg by mouth 2 times daily   Refills:  0       calcium carbonate 1250 (500 CA) MG/5ML Susp suspension   Used for:  Malnutrition (H)        Dose:  1250 mg   5 mLs (1,250 mg) by Per J Tube route 3 times daily (with meals)   Quantity:  450 mL   Refills:  0       carBAMazepine 100 MG/5ML suspension   Commonly known as:  TEGretol        Dose:  150 mg   Take 150 mg by mouth every 6 hours   Refills:  0       fiber modular packet   Used for:  Necrotizing pancreatitis        Dose:  1 packet   1 packet by Per Feeding Tube route 3 times daily   Refills:  0       * hydrocortisone 2 mg/mL Susp   Commonly known as:  CORTEF   Used for:  Panhypopituitarism (H)        Dose:  20 mg   10 mLs (20 mg) by Per J Tube route every morning   Refills:  0       * hydrocortisone 2 mg/mL Susp   Commonly known as:  CORTEF   Used for:  Panhypopituitarism (H)        Dose:  10 mg   Take 5 mLs (10 mg) by mouth every evening   Refills:  0       melatonin 1 MG/ML Liqd liquid        Dose:  6 mg   6 mg by Jejunal Tube route nightly as needed for sleep   Refills:  0       miconazole 2 % powder   Commonly known as:  MICATIN; MICRO GUARD   Used for:  Rash        Apply topically every hour as needed for other (topical candidiasis)   Refills:  0       multivitamins with minerals Liqd liquid   Used for:  Necrotizing pancreatitis, Malnutrition (H)        Dose:  15 mL   15 mLs by Per J Tube route daily   Refills:  0       nystatin 034747 UNIT/GM Powd   Commonly known as:  MYCOSTATIN   Used for:  Candidiasis of skin        Apply three times daily to the groin for 5 days.   Quantity:  15 g   Refills:  0       pantoprazole Susp suspension   Commonly known as:  PROTONIX        Dose:  20 mg   Take 20 mg by mouth daily   Refills:  0       potassium & sodium phosphates 280-160-250 MG Packet   Commonly known as:   NEUTRA-PHOS        Dose:  1 packet   Take 1 packet by mouth 3 times daily 0900, 1500, 2100.   Refills:  0       testosterone cypionate 200 MG/ML injection   Commonly known as:  DEPOTESTOTERONE        Dose:  100 mg   Inject 100 mg into the muscle See Admin Instructions Every 2-3 weeks.   Refills:  0       VITAMIN D (CHOLECALCIFEROL) PO        Dose:  2000 Units   Take 2,000 Units by mouth daily   Refills:  0       * Notice:  This list has 4 medication(s) that are the same as other medications prescribed for you. Read the directions carefully, and ask your doctor or other care provider to review them with you.         Where to get your medicines      These medications were sent to [facility] [place], [place] - [address]  [address], [place] [address]     Phone:  [phone]     amoxicillin-clavulanate 400-57 MG/5ML suspension                Protect others around you: Learn how to safely use, store and throw away your medicines at www.disposemymeds.org.        ANTIBIOTIC INSTRUCTION     You've Been Prescribed an Antibiotic - Now What?  Your healthcare team thinks that you or your loved one might have an infection. Some infections can be treated with antibiotics, which are powerful, life-saving drugs. Like all medications, antibiotics have side effects and should only be used when necessary. There are some important things you should know about your antibiotic treatment.      Your healthcare team may run tests before you start taking an antibiotic.    Your team may take samples (e.g., from your blood, urine or other areas) to run tests to look for bacteria. These test can be important to determine if you need an antibiotic at all and, if you do, which antibiotic will work best.      Within a few days, your healthcare team might change or even stop your antibiotic.    Your team may start you on an antibiotic while they are working to find out what is making you sick.    Your team might  change your antibiotic because test results show that a different antibiotic would be better to treat your infection.    In some cases, once your team has more information, they learn that you do not need an antibiotic at all. They may find out that you don't have an infection, or that the antibiotic you're taking won't work against your infection. For example, an infection caused by a virus can't be treated with antibiotics. Staying on an antibiotic when you don't need it is more likely to be harmful than helpful.      You may experience side effects from your antibiotic.    Like all medications, antibiotics have side effects. Some of these can be serious.    Let you healthcare team know if you have any known allergies when you are admitted to the hospital.    One significant side effect of nearly all antibiotics is the risk of severe and sometimes deadly diarrhea caused by Clostridium difficile (C. Difficile). This occurs when a person takes antibiotics because some good germs are destroyed. Antibiotic use allows C. diificile to take over, putting patients at high risk for this serious infection.    As a patient or caregiver, it is important to understand your or your loved one's antibiotic treatment. It is especially important for caregivers to speak up when patients can't speak for themselves. Here are some important questions to ask your healthcare team.    What infection is this antibiotic treating and how do you know I have that infection?    What side effects might occur from this antibiotic?    How long will I need to take this antibiotic?    Is it safe to take this antibiotic with other medications or supplements (e.g., vitamins) that I am taking?     Are there any special directions I need to know about taking this antibiotic? For example, should I take it with food?    How will I be monitored to know whether my infection is responding to the antibiotic?    What tests may help to make sure the right  antibiotic is prescribed for me?      Information provided by:  www.cdc.gov/getsmart  U.S. Department of Health and Human Services  Centers for disease Control and Prevention  National Center for Emerging and Zoonotic Infectious Diseases  Division of Healthcare Quality Promotion             Medication List: This is a list of all your medications and when to take them. Check marks below indicate your daily home schedule. Keep this list as a reference.      Medications           Morning Afternoon Evening Bedtime As Needed    ACETAMINOPHEN PO   650 mg by Per Feeding Tube route every 4 hours as needed for pain   Last time this was given:  650 mg on 4/2/2018 11:51 AM                                   * albuterol (2.5 MG/3ML) 0.083% neb solution   Take 1 vial by nebulization every 4 hours as needed for shortness of breath / dyspnea or wheezing                                   * albuterol (2.5 MG/3ML) 0.083% neb solution   Take 1 vial (2.5 mg) by nebulization every 4 hours as needed for shortness of breath / dyspnea                                   amoxicillin-clavulanate 400-57 MG/5ML suspension   Commonly known as:  AUGMENTIN   Take 7.5 mLs (600 mg) by mouth every 12 hours for 3 days   Last time this was given:  600 mg on 4/4/2018 12:50 PM                                      aspirin 10 mg/mL Susp   8.1 mLs (81 mg) by Per J Tube route daily                                   bacitracin ointment   Apply topically daily as needed for wound care To PEG site.                                   bisacodyl 10 MG Suppository   Commonly known as:  DULCOLAX   Place 1 suppository (10 mg) rectally daily as needed for constipation                                   BRIVIACT 10 MG/ML solution   Take 100 mg by mouth 2 times daily   Last time this was given:  100 mg on 4/4/2018  9:16 AM   Generic drug:  Brivaracetam                                      calcium carbonate 1250 (500 CA) MG/5ML Susp suspension   5 mLs (1,250 mg) by Per J  Tube route 3 times daily (with meals)                                         carBAMazepine 100 MG/5ML suspension   Commonly known as:  TEGretol   Take 150 mg by mouth every 6 hours   Last time this was given:  150 mg on 4/4/2018  8:46 AM                                         fiber modular packet   1 packet by Per Feeding Tube route 3 times daily   Last time this was given:  1 packet on 4/4/2018  8:46 AM                                         * hydrocortisone 2 mg/mL Susp   Commonly known as:  CORTEF   10 mLs (20 mg) by Per J Tube route every morning                                   * hydrocortisone 2 mg/mL Susp   Commonly known as:  CORTEF   Take 5 mLs (10 mg) by mouth every evening                                   levothyroxine 25 mcg/mL Susp   Commonly known as:  SYNTHROID   6 mLs (150 mcg) by Per J Tube route every morning (before breakfast)                                   melatonin 1 MG/ML Liqd liquid   6 mg by Jejunal Tube route nightly as needed for sleep                                   miconazole 2 % powder   Commonly known as:  MICATIN; MICRO GUARD   Apply topically every hour as needed for other (topical candidiasis)                                   multivitamins with minerals Liqd liquid   15 mLs by Per J Tube route daily   Last time this was given:  15 mLs on 4/4/2018  8:46 AM                                   nystatin [id number] UNIT/GM Powd   Commonly known as:  MYCOSTATIN   Apply three times daily to the groin for 5 days.                                         pantoprazole Susp suspension   Commonly known as:  PROTONIX   Take 20 mg by mouth daily   Last time this was given:  20 mg on 4/4/2018  8:46 AM                                   potassium & sodium phosphates 280-160-250 MG Packet   Commonly known as:  NEUTRA-PHOS   Take 1 packet by mouth 3 times daily 0900, 1500, 2100.                                         testosterone cypionate 200 MG/ML injection   Commonly known as:  DEPOTESTOTERONE    Inject 100 mg into the muscle See Admin Instructions Every 2-3 weeks.            As previously directed                       VITAMIN D (CHOLECALCIFEROL) PO   Take 2,000 Units by mouth daily   Last time this was given:  2,000 Units on 4/4/2018  8:46 AM                                   * Notice:  This list has 4 medication(s) that are the same as other medications prescribed for you. Read the directions carefully, and ask your doctor or other care provider to review them with you.              More Information        Methicillin-Resistant Staphylococcus aureus (MRSA)  What is MRSA?  Staphylococcus aureus bacteria or  staph  is a very common germ. It is found on the skin or in the nose of many people. Sometimes the bacteria causes no problem, or it causes a mild infection. But it can also cause severe infections of the skin, lungs, blood, or other organs or tissues. Some staph infections can be easily treated with antibiotics. But one type of staph, Methicillin-Resistant staphylococcus areas (MRSA) can t. It s called Methicillin-Resistant because the antibiotic methicillin, which used to be effective treatment, no longer works. MRSA is common in hospitals and nursing homes or long-term care facilities. It is also spreading among healthy children and adults outside the health care system. There are two different ways MRSA can appear in the body:    Colonization: When a person carries the MRSA bacteria (often in nose or on skin), but is healthy. This person can spread MRSA to others.    Infection: When a person gets sick because of the bacteria, it's called being infected with MRSA. This person can also spread MRSA to others. If not treated properly, MRSA infections can be very serious.    What are the symptoms of MRSA infection?  MRSA skin infections start as small red bumps on the skin that look like pimples or spider bites. The small bumps usually get larger and become swollen, painful, warm to the touch and  filled with pus. MRSA can also start in other ways, and it can spread deeper into the body. Common places and symptoms include:    Urinary tract: pain and burning when urinating, the need to urinate more often, fever    Blood: high fever, chills, nausea and vomiting, shortness of breath, confusion    Bone, muscle, or other tissue infections    Lungs: pneumonia in one or both lungs    Surgical wound infections    Heart: Infection of the lining of the heart called endocarditis  Who s at risk?  Anyone can get a staph infection. But certain factors make infection more likely, including:    A current or recent stay in the hospital    Living in a nursing home or long-term care facility or other crowded areas, like  barracks or [facility]    Taking antibiotics    Having certain health conditions, such as diabetes or HIV    Getting kidney dialysis    Sharing sports equipment, razors or other sharp objects  How does MRSA spread?  MRSA usually spreads through skin-to-skin contact, whether through contact with an infected person or on the hands of health care workers who work with infected patients. MRSA can also spread through contact with contaminated objects, such as cart handles and bedrails or shared towels or athletic equipment.  How is MRSA treated?  MRSA infections are usually treated with antibiotics. These may be in pill form or through a vein (intravenous or IV). If you have a skin abscess, we may drain it.   Patients who test positive for MRSA colonization may go through a process called decolonization. We apply a topical antibiotic inside your nose to kill the bacteria. We may also wash your skin with a special soap.  Controlling and Preventing MRSA: In the hospital  Hospitals and nursing homes control and prevent MRSA by doing the following:    Handwashing. This is the single most important way to prevent the spread of germs.    Protective clothing. Health care workers and visitors may wear gloves and a gown  when entering the room of a patient with MRSA. They remove these items before leaving.    Avoid antibiotic overuse. Too much use can cause germs to resist some antibiotics.    Monitoring. Hospitals monitor the spread of MRSA and educate all staff on the best ways to prevent it.  What you can do as a patient    Ask all hospital staff to wash their hands before touching you. Don t be afraid to speak up!    Wash your own hands often with soap and warm water, or use an alcohol-based hand gel. This is especially important:    After using the bathroom    After touching a bandage    Before eating    Encourage family and friends to wash hands as well    If you need to have a test done, such as an X-ray, follow instructions from staff. You may need to change into a clean hospital gown and wash your hands just before leaving your room.  Controlling and Preventing MRSA: at Home  Patients:    Wash your hands often with soap and warm water, or use an alcohol-based hand gel. This is especially important:    After using the bathroom    After touching a bandage    Before eating    Follow instructions we give you for caring for surgical wounds or any tubes that you have, such as a catheter or dialysis port.    Keep cuts and scrapes clean and covered until they heal.    Do not share towels, razors, clothing or athletic equipment.    Take all antibiotics your doctor prescribed. Don t take half doses or stop the antibiotics, even if you feel better.  Caregivers:    Wash your hands well with soap and warm water before and after any contact with the patient. You can use an alcohol-based hand gel if your hands aren t visibly dirty.    Wear disposable gloves when changing a bandage, touching an infected wound or handling dirty laundry. Throw away the gloves after each use. Then wash your hands well.    Change the patient s bedding once a week, or more often if it s soiled with feces or body fluids. Wash and dry it alone in a washer and  dryer using the warmest temperatures recommended on the labels. Use liquid bleach during the wash cycle if the label permits.    Clean surfaces like tabletops and sinks really well. Keep bathrooms, toilets and bedside commodes clean. A mixture of 1/4 cup of bleach to 1 quart of water works great for this.  Understanding drug resistance  Hard-to-kill (resistant) germs, such as MRSA, develop when antibiotics are taken longer than needed. They can also develop when antibiotics are taken when they aren't needed, or are not taken exactly as directed. This is because any germs that survive treatment with an antibiotic can multiply and thus create more resistant germs. The more often antibiotics are used, the more chances resistant germs have to develop. This is why your care team may not prescribe antibiotics unless he or she is certain that they are needed.  Date Last Reviewed: 10/1/2016    [id number] [employer]. [address] [place] [address], [place] [address]. All rights reserved. This information is not intended as a substitute for professional medical care. Always follow your healthcare professional's instructions.  This information has been modified by your health care provider with permission from the publisher.

## 2018-04-01 LAB
ANION GAP SERPL CALCULATED.3IONS-SCNC: 7 MMOL/L (ref 3–14)
BASOPHILS # BLD AUTO: 0.1 10E9/L (ref 0–0.2)
BASOPHILS NFR BLD AUTO: 0.7 %
BUN SERPL-MCNC: 14 MG/DL (ref 7–30)
CALCIUM SERPL-MCNC: 8.3 MG/DL (ref 8.5–10.1)
CHLORIDE SERPL-SCNC: 103 MMOL/L (ref 94–109)
CO2 SERPL-SCNC: 25 MMOL/L (ref 20–32)
CREAT SERPL-MCNC: 0.97 MG/DL (ref 0.66–1.25)
DIFFERENTIAL METHOD BLD: ABNORMAL
EOSINOPHIL # BLD AUTO: 1 10E9/L (ref 0–0.7)
EOSINOPHIL NFR BLD AUTO: 9.6 %
ERYTHROCYTE [DISTWIDTH] IN BLOOD BY AUTOMATED COUNT: 13.7 % (ref 10–15)
FLUAV+FLUBV RNA SPEC QL NAA+PROBE: NEGATIVE
FLUAV+FLUBV RNA SPEC QL NAA+PROBE: NEGATIVE
GFR SERPL CREATININE-BSD FRML MDRD: 80 ML/MIN/1.7M2
GLUCOSE SERPL-MCNC: 75 MG/DL (ref 70–99)
HCT VFR BLD AUTO: 35.8 % (ref 40–53)
HGB BLD-MCNC: 12.1 G/DL (ref 13.3–17.7)
IMM GRANULOCYTES # BLD: 0 10E9/L (ref 0–0.4)
IMM GRANULOCYTES NFR BLD: 0.2 %
LACTATE BLD-SCNC: 2.1 MMOL/L (ref 0.7–2)
LACTATE SERPL-SCNC: 2.6 MMOL/L (ref 0.4–2)
LYMPHOCYTES # BLD AUTO: 2.6 10E9/L (ref 0.8–5.3)
LYMPHOCYTES NFR BLD AUTO: 26 %
MCH RBC QN AUTO: 30 PG (ref 26.5–33)
MCHC RBC AUTO-ENTMCNC: 33.8 G/DL (ref 31.5–36.5)
MCV RBC AUTO: 89 FL (ref 78–100)
MONOCYTES # BLD AUTO: 0.9 10E9/L (ref 0–1.3)
MONOCYTES NFR BLD AUTO: 9 %
NEUTROPHILS # BLD AUTO: 5.4 10E9/L (ref 1.6–8.3)
NEUTROPHILS NFR BLD AUTO: 54.5 %
NRBC # BLD AUTO: 0 10*3/UL
NRBC BLD AUTO-RTO: 0 /100
PLATELET # BLD AUTO: 115 10E9/L (ref 150–450)
POTASSIUM SERPL-SCNC: 4.2 MMOL/L (ref 3.4–5.3)
RBC # BLD AUTO: 4.04 10E12/L (ref 4.4–5.9)
RSV RNA SPEC NAA+PROBE: NEGATIVE
SODIUM SERPL-SCNC: 135 MMOL/L (ref 133–144)
SPECIMEN SOURCE: NORMAL
WBC # BLD AUTO: 10 10E9/L (ref 4–11)

## 2018-04-01 PROCEDURE — 25000128 H RX IP 250 OP 636: Performed by: HOSPITALIST

## 2018-04-01 PROCEDURE — 85025 COMPLETE CBC W/AUTO DIFF WBC: CPT | Performed by: HOSPITALIST

## 2018-04-01 PROCEDURE — 36415 COLL VENOUS BLD VENIPUNCTURE: CPT | Performed by: HOSPITALIST

## 2018-04-01 PROCEDURE — 25000132 ZZH RX MED GY IP 250 OP 250 PS 637: Mod: GY

## 2018-04-01 PROCEDURE — 25000125 ZZHC RX 250: Performed by: HOSPITALIST

## 2018-04-01 PROCEDURE — 12000000 ZZH R&B MED SURG/OB

## 2018-04-01 PROCEDURE — 80048 BASIC METABOLIC PNL TOTAL CA: CPT | Performed by: HOSPITALIST

## 2018-04-01 PROCEDURE — 83605 ASSAY OF LACTIC ACID: CPT | Performed by: HOSPITALIST

## 2018-04-01 PROCEDURE — 99232 SBSQ HOSP IP/OBS MODERATE 35: CPT | Performed by: INTERNAL MEDICINE

## 2018-04-01 PROCEDURE — 25000132 ZZH RX MED GY IP 250 OP 250 PS 637: Mod: GY | Performed by: HOSPITALIST

## 2018-04-01 PROCEDURE — 27210995 ZZH RX 272: Performed by: HOSPITALIST

## 2018-04-01 PROCEDURE — 27210429 ZZH NUTRITION PRODUCT INTERMEDIATE LITER

## 2018-04-01 PROCEDURE — A9270 NON-COVERED ITEM OR SERVICE: HCPCS | Mod: GY | Performed by: HOSPITALIST

## 2018-04-01 RX ORDER — ACETAMINOPHEN 325 MG/1
650 TABLET ORAL EVERY 4 HOURS PRN
Status: DISCONTINUED | OUTPATIENT
Start: 2018-04-01 | End: 2018-04-04 | Stop reason: HOSPADM

## 2018-04-01 RX ORDER — POTASSIUM CL/LIDO/0.9 % NACL 10MEQ/0.1L
10 INTRAVENOUS SOLUTION, PIGGYBACK (ML) INTRAVENOUS
Status: DISCONTINUED | OUTPATIENT
Start: 2018-04-01 | End: 2018-04-04 | Stop reason: HOSPADM

## 2018-04-01 RX ORDER — POTASSIUM CHLORIDE 1500 MG/1
20-40 TABLET, EXTENDED RELEASE ORAL
Status: DISCONTINUED | OUTPATIENT
Start: 2018-04-01 | End: 2018-04-04 | Stop reason: HOSPADM

## 2018-04-01 RX ORDER — NALOXONE HYDROCHLORIDE 0.4 MG/ML
.1-.4 INJECTION, SOLUTION INTRAMUSCULAR; INTRAVENOUS; SUBCUTANEOUS
Status: DISCONTINUED | OUTPATIENT
Start: 2018-04-01 | End: 2018-04-04 | Stop reason: HOSPADM

## 2018-04-01 RX ORDER — ACETAMINOPHEN 650 MG/1
650 SUPPOSITORY RECTAL EVERY 4 HOURS PRN
Status: DISCONTINUED | OUTPATIENT
Start: 2018-04-01 | End: 2018-04-04 | Stop reason: HOSPADM

## 2018-04-01 RX ORDER — POLYETHYLENE GLYCOL 3350 17 G/17G
17 POWDER, FOR SOLUTION ORAL DAILY PRN
Status: DISCONTINUED | OUTPATIENT
Start: 2018-04-01 | End: 2018-04-04 | Stop reason: HOSPADM

## 2018-04-01 RX ORDER — SODIUM CHLORIDE AND POTASSIUM CHLORIDE 150; 900 MG/100ML; MG/100ML
INJECTION, SOLUTION INTRAVENOUS CONTINUOUS
Status: DISCONTINUED | OUTPATIENT
Start: 2018-04-01 | End: 2018-04-03

## 2018-04-01 RX ORDER — HYDROCORTISONE 10 MG/1
10 TABLET ORAL EVERY EVENING
Status: DISCONTINUED | OUTPATIENT
Start: 2018-04-01 | End: 2018-04-04 | Stop reason: HOSPADM

## 2018-04-01 RX ORDER — BISACODYL 10 MG
10 SUPPOSITORY, RECTAL RECTAL DAILY PRN
Status: DISCONTINUED | OUTPATIENT
Start: 2018-04-01 | End: 2018-04-04 | Stop reason: HOSPADM

## 2018-04-01 RX ORDER — LANOLIN ALCOHOL/MO/W.PET/CERES
6 CREAM (GRAM) TOPICAL
Status: DISCONTINUED | OUTPATIENT
Start: 2018-04-01 | End: 2018-04-04 | Stop reason: HOSPADM

## 2018-04-01 RX ORDER — POTASSIUM CHLORIDE 7.45 MG/ML
10 INJECTION INTRAVENOUS
Status: DISCONTINUED | OUTPATIENT
Start: 2018-04-01 | End: 2018-04-04 | Stop reason: HOSPADM

## 2018-04-01 RX ORDER — BACITRACIN ZINC 500 [USP'U]/G
OINTMENT TOPICAL DAILY PRN
Status: DISCONTINUED | OUTPATIENT
Start: 2018-04-01 | End: 2018-04-04 | Stop reason: HOSPADM

## 2018-04-01 RX ORDER — ASPIRIN 81 MG/1
81 TABLET, CHEWABLE ORAL DAILY
Status: DISCONTINUED | OUTPATIENT
Start: 2018-04-01 | End: 2018-04-04 | Stop reason: HOSPADM

## 2018-04-01 RX ORDER — ONDANSETRON 4 MG/1
4 TABLET, ORALLY DISINTEGRATING ORAL EVERY 6 HOURS PRN
Status: DISCONTINUED | OUTPATIENT
Start: 2018-04-01 | End: 2018-04-04 | Stop reason: HOSPADM

## 2018-04-01 RX ORDER — ONDANSETRON 2 MG/ML
4 INJECTION INTRAMUSCULAR; INTRAVENOUS EVERY 6 HOURS PRN
Status: DISCONTINUED | OUTPATIENT
Start: 2018-04-01 | End: 2018-04-04 | Stop reason: HOSPADM

## 2018-04-01 RX ORDER — PIPERACILLIN SODIUM, TAZOBACTAM SODIUM 3; .375 G/15ML; G/15ML
3.38 INJECTION, POWDER, LYOPHILIZED, FOR SOLUTION INTRAVENOUS EVERY 6 HOURS
Status: DISCONTINUED | OUTPATIENT
Start: 2018-04-01 | End: 2018-04-03

## 2018-04-01 RX ORDER — POTASSIUM CHLORIDE 29.8 MG/ML
20 INJECTION INTRAVENOUS
Status: DISCONTINUED | OUTPATIENT
Start: 2018-04-01 | End: 2018-04-04 | Stop reason: HOSPADM

## 2018-04-01 RX ORDER — LEVOTHYROXINE SODIUM 150 UG/1
150 TABLET ORAL
Status: DISCONTINUED | OUTPATIENT
Start: 2018-04-01 | End: 2018-04-04 | Stop reason: HOSPADM

## 2018-04-01 RX ORDER — GUAR GUM
1 PACKET (EA) ORAL 3 TIMES DAILY
Status: DISCONTINUED | OUTPATIENT
Start: 2018-04-01 | End: 2018-04-04 | Stop reason: HOSPADM

## 2018-04-01 RX ORDER — POTASSIUM CHLORIDE 1.5 G/1.58G
20-40 POWDER, FOR SOLUTION ORAL
Status: DISCONTINUED | OUTPATIENT
Start: 2018-04-01 | End: 2018-04-04 | Stop reason: HOSPADM

## 2018-04-01 RX ORDER — ALBUTEROL SULFATE 0.83 MG/ML
2.5 SOLUTION RESPIRATORY (INHALATION)
Status: DISCONTINUED | OUTPATIENT
Start: 2018-04-01 | End: 2018-04-02 | Stop reason: DRUGHIGH

## 2018-04-01 RX ORDER — CARBAMAZEPINE 100 MG/5ML
150 SUSPENSION ORAL EVERY 6 HOURS
Status: DISCONTINUED | OUTPATIENT
Start: 2018-04-01 | End: 2018-04-04 | Stop reason: HOSPADM

## 2018-04-01 RX ORDER — HYDROCORTISONE 10 MG/1
20 TABLET ORAL EVERY MORNING
Status: DISCONTINUED | OUTPATIENT
Start: 2018-04-01 | End: 2018-04-04 | Stop reason: HOSPADM

## 2018-04-01 RX ADMIN — PIPERACILLIN SODIUM,TAZOBACTAM SODIUM 3.38 G: 3; .375 INJECTION, POWDER, FOR SOLUTION INTRAVENOUS at 17:19

## 2018-04-01 RX ADMIN — HYDROCORTISONE 10 MG: 10 TABLET ORAL at 20:28

## 2018-04-01 RX ADMIN — CARBAMAZEPINE 150 MG: 100 SUSPENSION ORAL at 01:48

## 2018-04-01 RX ADMIN — ENOXAPARIN SODIUM 40 MG: 40 INJECTION SUBCUTANEOUS at 08:20

## 2018-04-01 RX ADMIN — Medication 1 PACKET: at 12:26

## 2018-04-01 RX ADMIN — POTASSIUM CHLORIDE AND SODIUM CHLORIDE: 900; 150 INJECTION, SOLUTION INTRAVENOUS at 09:49

## 2018-04-01 RX ADMIN — FAMOTIDINE 20 MG: 10 INJECTION, SOLUTION INTRAVENOUS at 01:55

## 2018-04-01 RX ADMIN — Medication 1 PACKET: at 21:16

## 2018-04-01 RX ADMIN — CARBAMAZEPINE 150 MG: 100 SUSPENSION ORAL at 20:28

## 2018-04-01 RX ADMIN — PIPERACILLIN SODIUM,TAZOBACTAM SODIUM 3.38 G: 3; .375 INJECTION, POWDER, FOR SOLUTION INTRAVENOUS at 11:37

## 2018-04-01 RX ADMIN — POTASSIUM CHLORIDE AND SODIUM CHLORIDE: 900; 150 INJECTION, SOLUTION INTRAVENOUS at 20:27

## 2018-04-01 RX ADMIN — LEVOTHYROXINE SODIUM 150 MCG: 150 TABLET ORAL at 06:26

## 2018-04-01 RX ADMIN — Medication 1 PACKET: at 17:19

## 2018-04-01 RX ADMIN — VITAMIN D, TAB 1000IU (100/BT) 2000 UNITS: 25 TAB at 08:19

## 2018-04-01 RX ADMIN — VANCOMYCIN HYDROCHLORIDE 1500 MG: 5 INJECTION, POWDER, LYOPHILIZED, FOR SOLUTION INTRAVENOUS at 12:27

## 2018-04-01 RX ADMIN — ASPIRIN 81 MG 81 MG: 81 TABLET ORAL at 08:19

## 2018-04-01 RX ADMIN — HYDROCORTISONE 20 MG: 10 TABLET ORAL at 08:18

## 2018-04-01 RX ADMIN — BRIVARACETAM 100 MG: 10 SOLUTION ORAL at 21:16

## 2018-04-01 RX ADMIN — POTASSIUM CHLORIDE AND SODIUM CHLORIDE: 900; 150 INJECTION, SOLUTION INTRAVENOUS at 01:36

## 2018-04-01 RX ADMIN — CARBAMAZEPINE 150 MG: 100 SUSPENSION ORAL at 14:04

## 2018-04-01 RX ADMIN — PANTOPRAZOLE SODIUM 20 MG: 40 TABLET, DELAYED RELEASE ORAL at 09:36

## 2018-04-01 RX ADMIN — CARBAMAZEPINE 150 MG: 100 SUSPENSION ORAL at 09:36

## 2018-04-01 RX ADMIN — PIPERACILLIN SODIUM,TAZOBACTAM SODIUM 3.38 G: 3; .375 INJECTION, POWDER, FOR SOLUTION INTRAVENOUS at 06:22

## 2018-04-01 RX ADMIN — BRIVARACETAM 100 MG: 10 SOLUTION ORAL at 10:15

## 2018-04-01 RX ADMIN — MULTIVITAMIN 15 ML: LIQUID ORAL at 09:37

## 2018-04-01 NOTE — PHARMACY-VANCOMYCIN DOSING SERVICE
Pharmacy Vancomycin Initial Note  Date of Service 2018  Patient's  1960  57 year old, male    Indication: Sepsis    Current estimated CrCl = Estimated Creatinine Clearance: 90.7 mL/min (based on Cr of 0.95).    Creatinine for last 3 days  3/31/2018:  9:44 PM Creatinine 0.95 mg/dL    Recent Vancomycin Level(s) for last 3 days  No results found for requested labs within last 72 hours.      Vancomycin IV Administrations (past 72 hours)                   vancomycin (VANCOCIN) 1500 mg in 0.9% NaCl 250 mL PREMIX (mg) 1,500 mg New Bag 18 2335                Nephrotoxins and other renal medications (Future)    Start     Dose/Rate Route Frequency Ordered Stop    18 1200  vancomycin (VANCOCIN) 1500 mg in 0.9% NaCl 250 mL PREMIX      1,500 mg  166.7 mL/hr over 90 Minutes Intravenous EVERY 12 HOURS 18 0150      18 0600  piperacillin-tazobactam (ZOSYN) 3.375 g vial to attach to  mL bag     Comments:  Pharmacy can adjust dose based on renal function.    3.375 g  over 30 Minutes Intravenous EVERY 6 HOURS 18 0047            Contrast Orders - past 72 hours     None                Plan:  1.  Start vancomycin 1500 mg IV q12h.   2.  Goal Trough Level: 15-20 mg/L   3.  Pharmacy will check trough levels as appropriate in 1-3 Days.    4. Serum creatinine levels will be ordered daily for the first week of therapy and at least twice weekly for subsequent weeks.    5. Alpha method utilized to dose vancomycin therapy: Method 1    Klever Rodriguez

## 2018-04-01 NOTE — ED PROVIDER NOTES
History     Chief Complaint:  Fever       HPI     History given by wife.     Keyon Farias is a 57 year old male who presents with fever, highest of 101.3 axillary. Tonight he was about to go to sleep, when he wife noticed he was warm and took his temperature. She states he had pneumonia in the past with fevers, last of which he was admitted 2/14/18-2/21/18. Patient also has a history of MRSA and VRE. Patient does not get any food through his mouth (uses a g tube) and he has not aspirated recently. Patient is aphasic at baseline from previous stroke. Patient has had a flu vaccination. Wife denies change in BMs or rashes. No other acute changes.  No abdominal pain.  No diarrhea or vomiting.  Patient is at his mental baseline.      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 No pertinent family history.    Social History:  Marital Status:   Presents to the ED with his wife.   Tobacco Use: former smoker (Quit: 4/23/1989)  Alcohol Use: No  PCP: Carlos Gomez      Review of Systems   All other systems reviewed and are negative.      Physical Exam   First Vitals:  BP: 110/63  Pulse: 116  Heart Rate: 116  Temp: 100.1  F (37.8  C)  Resp: 16  Weight: 73 kg (161  lb)  SpO2: 97 %      Physical Exam  General: Resting on the bed.  Ears, Nose, Throat:  External ears normal.  Nose normal.  No pharyngeal erythema, swelling or exudate.  Midline uvula.    Eyes:  Conjunctivae clear.  Pupils are equal, round, and reactive.   Neck: Normal range of motion.  Neck supple.   CV: Regular rate and rhythm.  No murmurs.      Respiratory: Effort normal and breath sounds normal.  No wheezing or crackles.   Gastrointestinal: Soft.  No distension. There is no tenderness.  G tube in place in the LUQ  Neuro: Alert. Moving all extremities appropriately. Non verbal.  2+ patellar reflexes, no clonus  Skin: Skin is warm and dry.  No rash noted.     Emergency Department Course     Imaging:  Radiographic findings were communicated with the patient who voiced understanding of the findings.    XR Chest 2 Views   Final Result   IMPRESSION: Low volume inspiration. There are tubes projecting in the   right upper quadrant. Heart size is normal. Pulmonary vasculature does   not appear distended. No focal infiltrates or definite pleural fluid.      SAMEER WRIGHT MD        Laboratory:  UA: Clear yellow urine, pH 8.0, protein 10, mucous present, otherwise WNL  Urine culture: Pending  CBC:  WBC 13.8 (H), HGB 13.5,  (L)  CMP:  (L), otherwise WNL (Creatinine 0.95)  (2202) Lactic acid: 2.7 (H)  (0009) Lactic acid: 2.6  Influenza A/B antigen: Negative  Influenza A and B and RSV PCR: Pending  Procalcitonin: <0.05  Blood cultures: Pending    Interventions:  2203: Lactated ringers, 2190 mL, IV bolus  2205: Tylenol, 650 mg, oral   2239: Zosyn, 4.5 g, IV injection  2335: Vancomycin, 1500 mg, IV injection    Emergency Department Course:  The patient arrived in triage where vitals were measured and recorded.   The patient was then escorted back to the emergency department.   The patient's medical records were reviewed.  Nursing notes and vitals were reviewed.  2141: I performed an exam of the patient as documented  above.  The above workup was undertaken.  2302: I rechecked the patient and discussed results.   Findings and plan explained to the patient and wife who consent to admission.   2335: I discussed the patient with Dr. Costa of the hospitalist service, who will admit the patient to a [facility] bed for further monitoring, evaluation, and treatment.    Impression & Plan      CMS Diagnoses:   The patient has signs of Severe Sepsis evidenced by:    1. 2 SIRS criteria, AND  2. Suspected infection, AND   3. Organ dysfunction: Lactic Acid > 1.9    Time severe sepsis diagnosis confirmed = 2202 as this was the time when Lactate resulted, and the level was > 1.9      3 Hour Severe Sepsis Bundle Completion:  1. Initial Lactic Acid Result:   Recent Labs   Lab Test  03/31/18   2144  02/16/18   0515  02/15/18   2315   LACT  2.7*  1.2  1.6     2. Blood Cultures before Antibiotics: Yes  3. Broad Spectrum Antibiotics Administered: Yes     Anti-infectives (Future)    Start     Dose/Rate Route Frequency Ordered Stop    03/31/18 2228  vancomycin (VANCOCIN) 1500 mg in 0.9% NaCl 250 mL PREMIX      1,500 mg  166.7 mL/hr over 90 Minutes Intravenous ONCE 03/31/18 2227          4. 2190 ml of IV fluids.  Ideal body weight: 77.6 kg (171 lb 1.2 oz)    Severe Sepsis reassessment:  1. Repeat Lactic Acid Level: 2.6  2. MAP>65 after initial IVF bolus, will continue to monitor fluid status and vital signs  I attest to having performed a repeat sepsis exam and assessment of perfusion at 0010 and the results demonstrate improved perfusion.      and None    Medical Decision Making:  Keyon Farias is a 57 year old male, with a history of TBI and stroke, who presents with fever. Patient was febrile to 102.6 and mildly tachycardic. Broad differential pursuit including, but not limited to, sepsis, infection, influenza, electrolyte or metabolic abnormalities, dehydration, seizure, etc. He is a rather complicated individual as noted above. Initial white count in 13.8  with no anemia. Lactate is elevated to 2.7. With this notable fever, he does meet SIRS criteria and meet severe sepsis given his lactate elevation. Broad spectrum antibiotics were started (vancomycin and Zosyn given history of current aspiration pneumonia and recurrent severe sepsis and septic shock). Blood pressures remain stable therefore he does not meet septic shock criteria. No indication for pressors.  After fluid bolus, repeat lactate shows 2.6 thus slowly improving.  His HR and temperature improved. Urinanalysis returned negative; No evidence of acute infection. Chest XR clear without evidence of pneumonia. Procalcitonin is low. Influenza swab is preliminarily negative. Patient has a non-tender abdomen, not concerning for acute surgical process.  No indication for CT abdomen. BMP shows no acute electrolyte, metabolic or renal abnormalities. LFTs are unremarkable. Patient does have a true fever, leukocytosis and lactic acidosis. Concern with his risk of deterioration that he likely needs observation and serial examination to see if there is any return or worsening symptoms that develop. Discussed with hospitalist who graciously accepts. Added on PCR for Influenza and RSV as in the past this has been an issue for him. Patient appears to be somewhat immunocompromised at baseline and does appear to have recurrent, concerning infections. Patient will be admitted for further fluid hydration, culture analysis and reassessment to see if any other symptoms develop.     Diagnosis:    ICD-10-CM    1. Severe sepsis (H) A41.9 UA with Microscopic    R65.20 Urine Culture Aerobic Bacterial     Blood culture     Influenza A and B and RSV PCR     Influenza A and B and RSV PCR     CANCELED: Influenza A and B and RSV PCR   2. Fever in other diseases R50.81        Disposition:  Admitted to an adult med bed.         Annie DYKES, yobani serving as a scribe on 3/31/2018 at 9:41 PM to personally document services performed by   Raf based on my observations and the provider's statements to me.    EMERGENCY DEPARTMENT       Megan Carbone MD  04/01/18 0045

## 2018-04-01 NOTE — PLAN OF CARE
Problem: Patient Care Overview  Goal: Plan of Care/Patient Progress Review  Outcome: No Change  Pt alert. Aphasic, but follows basic directions/commands. VSS on RA. Afebrile. Total cares. Turn and repo q2h. NPO- history of dysphagia, GJ tube patent, Nutrition consult. Blanchable redness on buttocks. Scabbing/rash on L shin and R forearm. LS diminished.  Tele NSR. IVF infusing @ 125. IV abx. SW consult.

## 2018-04-01 NOTE — ED NOTES
[facility]  ED Nurse Handoff Report    ED Chief complaint: Fever (wife reports fever of 101.3 axillary at home; reports possibly seeming nauseated; aphasic from old stroke at baseline)      ED Diagnosis:   Final diagnoses:   Severe sepsis (H)   Fever in other diseases       Code Status: Full Code    Allergies:   Allergies   Allergen Reactions     Dilantin [Phenytoin Sodium]      Valproic Acid      Toxicity c bone marrow suspension, elevated ammonia levels        Activity level - Baseline/Home:  Total Care    Activity Level - Current:   Total Care     Needed?: No    Isolation: Yes  Infection: Not Applicable  VRE, MRSA    Bariatric?: No    Vital Signs:   Vitals:    03/31/18 2240 03/31/18 2242 03/31/18 2300 03/31/18 2320   BP: 116/74  129/72 122/72   Pulse:       Resp:  23  16   Temp:    99.9  F (37.7  C)   TempSrc:    Oral   SpO2: 97% 96%     Weight:           Cardiac Rhythm:NSR,        Pain level:      Is this patient confused?: No     Patient Report: Initial Complaint: Pt with hx CVA and baseline aphasia presents to ED after feeling warm and exhibiting signs of nausea by opening mouth and sticking out tongue at home per Mother. Pt was last hospitalized on 2/14-21 for pneumonia. Pt had a fever of 102 on arrival. Pt has improved in his time in ED, temperature has declined and pt smiling. Pt able to follow commands.   Focused Assessment: Breathing, easy non-labored. Skin w/d. No distress. Aphasia. Gtube.   Tests Performed: labs,   Abnormal Results: elevated WBC and lactic  Treatments provided: 2190L LR bolus-- pt is receiving the last 200cc of this fluid bolus at this time and after the fluid bolus is finished can receive maintenance fluids, vanco, zosyn, tylenol,,     Family Comments: mother    OBS brochure/video discussed/provided to patient: N/A    ED Medications:   Medications   lactated ringers infusion (not administered)   vancomycin (VANCOCIN) 1500 mg in 0.9% NaCl 250 mL PREMIX (1,500  mg Intravenous New Bag 3/31/18 2335)   lactated ringers BOLUS 2,190 mL (2,190 mLs Intravenous New Bag 3/31/18 2203)   acetaminophen (TYLENOL) solution 650 mg (650 mg Oral Given 3/31/18 2205)   piperacillin-tazobactam (ZOSYN) 4.5 g vial to attach to  mL bag (0 g Intravenous Stopped 3/31/18 2331)       Drips infusing?:  Yes    For the majority of the shift this patient was Green.   Interventions performed were .    Severe Sepsis OR Septic Shock Diagnosis Present:   Yes    Per the ED Provider, Time Zero for severe sepsis or septic shock is:  severe    3 Hour Severe Sepsis Bundle Completion:  1. Initial Lactic Acid Result:   Recent Labs   Lab Test  03/31/18   2144  02/16/18   0515  02/15/18   2315   LACT  2.7*  1.2  1.6     2. Blood Cultures before Antibiotics: Yes  3. Broad Spectrum Antibiotics Administered:     Anti-infectives (Future)    Start     Dose/Rate Route Frequency Ordered Stop    03/31/18 2228  vancomycin (VANCOCIN) 1500 mg in 0.9% NaCl 250 mL PREMIX      1,500 mg  166.7 mL/hr over 90 Minutes Intravenous ONCE 03/31/18 2227          4. 1800 ml of IV fluids have been given so far      6 Hour Severe Sepsis Bundle Completion:    1. Repeat Lactic Acid Level:   Last result   Lab Results   Component Value Date    LACT 2.7 (H) 03/31/2018     2. Patient currently on Vasopressors =  No      ED NURSE PHONE NUMBER: [phone]

## 2018-04-01 NOTE — CONSULTS
"CLINICAL NUTRITION SERVICES  -  ASSESSMENT NOTE      Recommendations Ordered by Registered Dietitian (RD):     TF regimen ---> Isosource 1.5 @ 60 mL/hr x 22 hrs (hold TF for 1 hr before and 1 hr after synthroid administration) = 1980 cals (26 cals/kg), 90 gm pro (1.2 gm/kg), 20 gm fiber, 1003 mL free water  Nutrisource Fiber - 1 pkt TID = 45 cals, 9 gm fiber  TOTAL = 2025 cals (26 cals/kg), 29 gm fiber  Water flushes of 60 mL every 4 hrs (while on IVF)     Malnutrition:   % Weight Loss:  None noted  % Intake:  No decreased intake noted (anticipate pt has been receiving his EN at home)  Subcutaneous Fat Loss:  None observed  Muscle Loss:  None observed  Fluid Retention:  None noted    Malnutrition Diagnosis: Patient does not meet two of the above criteria necessary for diagnosing malnutrition          REASON FOR ASSESSMENT  Keyon Farias is a 57 year old male seen by Registered Dietitian for Provider Order - Registered Dietitian to Assess and Order TF per Medical Nutrition protocol      NUTRITION HISTORY  Unable to obtain diet/TF history from pt (aphasic)  Mother (Savannah) not here - attempted to call Savannah at home, no answer    Per chart review of Feb admit, pt was on Isosource 1.5 @ 60 mL/hr x 22 hrs (TF held for synthroid administration)  Received Nutrisource Fiber 1 packet TID  Water flushes of 150 mL every 4 hrs (900 mL)  Pt had a new 18 Fr G-J tube placed on 2/19/18 (previous tube was clogged)    Pt has home RN services to help with TF      CURRENT NUTRITION ORDERS  Diet Order:     NPO     Mother reported on admit that pt has been tolerating his TF - no diarrhea        PHYSICAL FINDINGS  Observed  No nutrition-related physical findings observed  Obtained from Chart/Interdisciplinary Team  Paraplegia with also contracted upper extremities, upper extremity is spastic  Aphasia       ANTHROPOMETRICS  Height: 6'0\"  Weight:(4/1) 76.1 kg / 167 lbs 12.32 oz  Body mass index is 22.75 kg/(m^2).  Weight Status:  Normal " BMI  IBW: 80.9 kg  % IBW: 94%  Weight History:   Wt Readings from Last 10 Encounters:   04/01/18 76.1 kg (167 lb 12.3 oz)   03/13/18 70.3 kg (155 lb)   02/21/18 73 kg (161 lb)   12/31/17 76.2 kg (168 lb)   10/07/17 77.3 kg (170 lb 6.7 oz)   07/27/17 73.9 kg (163 lb)   06/15/17 70.3 kg (155 lb)   06/07/17 70.7 kg (155 lb 12.8 oz)   05/15/17 74.8 kg (165 lb)   02/09/17 73.3 kg (161 lb 8 oz)         LABS  Labs reviewed    MEDICATIONS  Synthroid once daily  Certavite  Nutrisource Fiber - 1 pkt TID  IVF (NaCl) at 125 mL/hr    Dosing Weight: (4/1) 76.1 kg    ASSESSED NUTRITION NEEDS PER APPROVED PRACTICE GUIDELINES:  Estimated Energy Needs: 5916-9546 kcals (25-30 Kcal/Kg)  Justification: maintenance  Estimated Protein Needs:  grams protein (1.2-1.5 g pro/Kg)  Justification: preservation of lean body mass  Estimated Fluid Needs: 1662-0717  mL (1 mL/Kcal)  Justification: maintenance    MALNUTRITION:  % Weight Loss:  None noted  % Intake:  No decreased intake noted (anticipate pt has been receiving his EN at home)  Subcutaneous Fat Loss:  None observed  Muscle Loss:  None observed  Fluid Retention:  None noted    Malnutrition Diagnosis: Patient does not meet two of the above criteria necessary for diagnosing malnutrition      NUTRITION DIAGNOSIS:  Inadequate protein-energy intake related to TF currently not running as evidenced by pt meeting 0% est needs      NUTRITION INTERVENTIONS  Recommendations / Nutrition Prescription  NPO    TF regimen ---> Isosource 1.5 @ 60 mL/hr x 22 hrs (hold TF for 1 hr before and 1 hr after synthroid administration) = 1980 cals (26 cals/kg), 90 gm pro (1.2 gm/kg), 20 gm fiber, 1003 mL free water  Nutrisource Fiber - 1 pkt TID = 45 cals, 9 gm fiber  TOTAL = 2025 cals (26 cals/kg), 29 gm fiber  Water flushes of 60 mL every 4 hrs (while on IVF)  .      Implementation  Nutrition education: No education needs assessed at this time  EN Composition, EN Schedule: EN ordered in EPIC   Collaboration  and Referral of Nutrition care: reviewed with RN plans to start TF  .      Nutrition Goals  EN of Isosource 1.5 @ 60 mL/hr x 22 hrs to meet % est needs  .      MONITORING AND EVALUATION:  Progress towards goals will be monitored and evaluated per protocol and Practice Guidelines   Follow up with family to determine home TF regimen

## 2018-04-01 NOTE — H&P
[facility]    History and Physical  Hospitalist       Date of Admission:  3/31/2018  Date of Service (when I saw the patient): 3/31/18    Assessment & Plan   Keyon Farias is a 57 year old male with PMH significant for TBI and seizure disorder and dysphagia with G-tube for feeding, panhypopituitarism, hyponatremia, recent hospitalization due to sepsis from right lower lobe pneumonia was brought to the ER by EMS for evaluation of fever, and is being admitted to manage sepsis due to unclear source.    Sepsis unclear source  Presents with fever 102.6, tachycardic to 110s, has leukocytosis but no specific system related symptoms noted.  Unclear source at this point. Chest x-ray, influenza screen, UA unremarkable.   -Blood cultures ×2 sent from ER, he started on vancomycin and Zosyn IV, continue  -Follow blood cultures and narrow antibiotics as able.  -Influenza and RSV PCR pending, follow  -Continue IV hydration, recheck lactic acid in the morning.  -Continue to monitor on telemetry, and with continuous pulse ox.    TBI and seizure disorder  Dysphagia with G-tube for feeding  Extremity contrature  -Prior to admission Briviact and carbamazepine continued.  -Tube feeding to continue  -Nutrition consult for assessment of tube feeding and any further recommendation.  -SW and PT eval for dispo planning    Panhypopituitarism  -Patient is on steroid and thyroid replacement, resumed  -His blood pressure is stable, if becomes hypotensive, will place him on stress dose steroid.    Hyponatremia  Mild, likely due to dehydration.  Sodium was low during his hospitalization last month as well.  -Monitor with IV hydration.     Thrombocytopenia: mild and is likely related to sepsis, monitor.    GERD: PTA PPI continued.    DVT Prophylaxis: Pneumatic Compression Devices and Lovenox     Code Status: Full Code    Disposition: Expected discharge: likely 3-4 days once fever resolves, source of infection identified, and abx  narrowed down/changed to PO, inpatient orders placed.    Above plan was discussed with patient's mom present at bedside.    Rupali Costa MD  Hospitalist  Pager: [phone]    Primary Care Physician   Carlos Gomez    Chief Complaint   Fever    History of Present Illness   Keyon Farias is a 57 year old male with PMH significant for TBI and seizure disorder and dysphagia with G-tube for feeding, panhypopituitarism, hyponatremia, recent hospitalization due to sepsis from right lower lobe pneumonia was brought to the ER by EMS for evaluation of fever. History obtained from patient, discussion with the ED physician Dr Carbone, and chart review.    According to the patient's mom, he had fever the night before with an axillary temperature of 101.3, it subsided and he was afebrile throughout the day yesterday.  See felt he was warm again last night and was febrile again.  Given its recent admission with sepsis and pneumonia, she was concerned and brought him to ER.  Patient has TBI and is not able to verbalize symptoms.  He only expresses yes or no.  He denies pain or dyspnea.  No nausea or vomiting.  No diarrhea.  No runny nose.  He was tolerating tube feeding.  No diarrhea.    In ER patient was evaluated by Dr Carbone.  Patient was febrile with a T-max of 102.6, he was tachycardic to 110s, blood pressure and pulse ox was normal.  He had mild leukocytosis of 13.8, but pro calcitonin was negative, lactic acid was elevated to 2.7.  UA was mostly unremarkable and chest x-ray also did not show any infiltrates or consolidation.  Influenza screen was negative, influenza and RSV PCR was sent.  Blood cultures ×2 were sent and was started on vancomycin and Zosyn IV.  Patient received 3 L of IV normal saline bolus.     Review of Systems   All 10 point systems reviewed and is negative other than noted in the HPI or here.    Past Medical History    I have reviewed this patient's medical history and updated it with pertinent  information as needed.  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      Pneumonia      Seizures (H)     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     Septic shock (H)      Spastic hemiplegia affecting dominant side (H)     related to wil injury     Thyroid disease      Tracheostomy care (H)      Traumatic brain injury (H) 1989     Unspecified cerebral artery occlusion with cerebral infarction 1989     UTI (urinary tract infection)      Ventricular fibrillation (H)      Ventricular tachyarrhythmia (H)        Past Surgical History   I have reviewed this patient's surgical history and updated it with pertinent information as needed.  Past Surgical History:   Procedure Laterality Date     ENDOSCOPIC ULTRASOUND UPPER GASTROINTESTINAL TRACT (GI) N/A 1/30/2017    Procedure: ENDOSCOPIC ULTRASOUND, ESOPHAGOSCOPY / UPPER GASTROINTESTINAL TRACT (GI);  Surgeon: Jus Montana MD;  Location: [facility] OR     ENDOSCOPIC ULTRASOUND, ESOPHAGOSCOPY, GASTROSCOPY, DUODENOSCOPY (EGD), NECROSECTOMY N/A 2/7/2017    Procedure: ENDOSCOPIC ULTRASOUND, ESOPHAGOSCOPY, GASTROSCOPY, DUODENOSCOPY (EGD), NECROSECTOMY;  Surgeon: Jack Marcus MD;  Location: [facility] OR     ESOPHAGOSCOPY, GASTROSCOPY, DUODENOSCOPY (EGD), COMBINED  3/13/2014    Procedure: COMBINED ESOPHAGOSCOPY, GASTROSCOPY, DUODENOSCOPY (EGD), BIOPSY SINGLE OR MULTIPLE;  gastroscopy;  Surgeon: Digna Rhodes MD;  Location: [facility]     ESOPHAGOSCOPY, GASTROSCOPY, DUODENOSCOPY (EGD), COMBINED N/A 12/6/2016    Procedure: COMBINED ESOPHAGOSCOPY, GASTROSCOPY, DUODENOSCOPY (EGD);  Surgeon: Digna Rhodes MD;  Location: [facility]      ESOPHAGOSCOPY, GASTROSCOPY, DUODENOSCOPY (EGD), COMBINED N/A 2017    Procedure: COMBINED ENDOSCOPIC ULTRASOUND, ESOPHAGOSCOPY, GASTROSCOPY, DUODENOSCOPY (EGD), FINE NEEDLE ASPIRATE/BIOPSY;  Surgeon: Too Thakur MD;  Location:  OR     HEAD & NECK SURGERY      reconstructive facial surgery following accident in      LAPAROSCOPIC APPENDECTOMY  2013    Procedure: LAPAROSCOPIC APPENDECTOMY;  LAPAROSCOPIC APPENDECTOMY;  Surgeon: Manish Pierce MD;  Location:  OR     LAPAROSCOPIC ASSISTED INSERTION TUBE GASTROTOMY N/A 2016    Procedure: LAPAROSCOPIC ASSISTED INSERTION TUBE GASTROSTOMY;  Surgeon: Manish Pierce MD;  Location:  OR     ORTHOPEDIC SURGERY      right hand repair     TRACHEOSTOMY N/A 9/3/2016    Procedure: TRACHEOSTOMY;  Surgeon: João Ortiz MD;  Location:  OR     TRACHEOSTOMY N/A 2016    Procedure: TRACHEOSTOMY;  Surgeon: João Ortiz MD;  Location:  OR     VASCULAR SURGERY         Prior to Admission Medications   Prior to Admission Medications   Prescriptions Last Dose Informant Patient Reported? Taking?   ACETAMINOPHEN PO  Mother Yes No   Si mg by Per Feeding Tube route every 4 hours as needed for pain   Brivaracetam (BRIVIACT) 10 MG/ML solution  Mother Yes No   Sig: Take 100 mg by mouth 2 times daily   VITAMIN D, CHOLECALCIFEROL, PO  Mother Yes No   Sig: Take 2,000 Units by mouth daily   albuterol (2.5 MG/3ML) 0.083% neb solution   No No   Sig: Take 1 vial (2.5 mg) by nebulization every 4 hours as needed for shortness of breath / dyspnea   aspirin 10 mg/mL  Mother No No   Si.1 mLs (81 mg) by Per J Tube route daily   bacitracin ointment  Mother Yes No   Sig: Apply topically daily as needed for wound care To PEG site.   bisacodyl (DULCOLAX) 10 MG Suppository  Mother No No   Sig: Place 1 suppository (10 mg) rectally daily as needed for constipation   calcium carbonate 1250 (500 CA) MG/5ML SUSP suspension  Mother No No    Si mLs (1,250 mg) by Per J Tube route 3 times daily (with meals)   carBAMazepine (TEGRETOL) 100 MG/5ML suspension  Mother Yes No   Sig: Take 150 mg by mouth every 6 hours   fiber modular (NUTRISOURCE FIBER) packet  Mother No No   Si packet by Per Feeding Tube route 3 times daily   hydrocortisone (CORTEF) 2 mg/mL  Mother No No   Sig: 10 mLs (20 mg) by Per J Tube route every morning   hydrocortisone (CORTEF) 2 mg/mL  Mother No No   Sig: Take 5 mLs (10 mg) by mouth every evening   levothyroxine (SYNTHROID) 25 mcg/mL  Mother No No   Si mLs (150 mcg) by Per J Tube route every morning (before breakfast)   Patient taking differently: 150 mcg by Per J Tube route every morning (before breakfast) Hold tube feeding 1 hour prior and 1 hour after medication administered.   melatonin (MELATONIN) 1 MG/ML LIQD liquid  Mother Yes No   Si mg by Jejunal Tube route nightly as needed for sleep   miconazole (MICATIN; MICRO GUARD) 2 % powder  Mother No No   Sig: Apply topically every hour as needed for other (topical candidiasis)   multivitamins with minerals (CERTAVITE/CEROVITE) LIQD liquid  Mother No No   Sig: 15 mLs by Per J Tube route daily   nystatin (MYCOSTATIN) 891064 UNIT/GM POWD   No No   Sig: Apply three times daily to the groin for 5 days.   pantoprazole (PROTONIX) SUSP suspension  Mother Yes No   Sig: Take 20 mg by mouth daily    potassium & sodium phosphates (NEUTRA-PHOS) 280-160-250 MG Packet  Mother Yes No   Sig: Take 1 packet by mouth 3 times daily 0900, 1500, 2100.    testosterone cypionate (DEPOTESTOTERONE CYPIONATE) 200 MG/ML injection  Mother Yes No   Sig: Inject 100 mg into the muscle See Admin Instructions Every 2-3 weeks.      Facility-Administered Medications: None     Allergies   Allergies   Allergen Reactions     Dilantin [Phenytoin Sodium]      Valproic Acid      Toxicity c bone marrow suspension, elevated ammonia levels        Social History   I have reviewed this patient's social history and  updated it with pertinent information as needed. Keyon Farias  reports that he quit smoking about 28 years ago. He has never used smokeless tobacco. He reports that he does not drink alcohol or use illicit drugs.    Family History   I have reviewed this patient's family history and is non contributory to his presentation at this time.    Physical Exam   Temp: 99.9  F (37.7  C) Temp src: Oral BP: 122/72 Pulse: 116 Heart Rate: 95 Resp: 16 SpO2: 96 % O2 Device: None (Room air)    Vital Signs with Ranges  Temp:  [99.9  F (37.7  C)-102.6  F (39.2  C)] 99.9  F (37.7  C)  Pulse:  [116] 116  Heart Rate:  [] 95  Resp:  [16-23] 16  BP: (110-129)/(63-74) 122/72  SpO2:  [96 %-97 %] 96 %  161 lbs 0 oz    Constitutional: Alert, awake, aphasic, does not seem to be in distress.  HEENT: PERRLA, EOMI. Oral mucosa is dry.  Healed tracheostomy scar noted. No neck stiffness noted.  Respiratory: Bilateral equal air entry, no wheezing or crackles. Normal work of breathing. On room air.  Cardiovascular: S1S2 regular, No murmur, mildly tachycardic between 9210.  GI: Abdomen is soft, feeding tube site is normal, abdomen is non distended, no guarding, rigidity. Bowel tones active.  Skin: No rash.   Musculoskeletal: Legs appear puffy  Neurologic: Alert and awake, aphasic, but noting his head for yes and no, follows verbal commands appropriately, CN 2-12 intact.  Paraplegia with also contracted upper extremities, upper extremity is spastic.  Psychiatric: Calm, co-operative, appropriate.    Data   Data reviewed today:  I personally reviewed the CXR image(s) showing No infiltrates/consolidation.    Recent Labs  Lab 03/31/18  2144   WBC 13.8*   HGB 13.5   MCV 89   *   *   POTASSIUM 4.4   CHLORIDE 95   CO2 31   BUN 16   CR 0.95   ANIONGAP 6   STEVE 9.0   GLC 92   ALBUMIN 3.6   PROTTOTAL 8.4   BILITOTAL 0.3   ALKPHOS 109   ALT 26   AST 19       Recent Results (from the past 24 hour(s))   XR Chest 2 Views    Narrative    CHEST TWO  VIEWS    3/31/2018 10:41 PM     HISTORY: Fever.    COMPARISON: 2/14/2018 chest x-ray.      Impression    IMPRESSION: Low volume inspiration. There are tubes projecting in the  right upper quadrant. Heart size is normal. Pulmonary vasculature does  not appear distended. No focal infiltrates or definite pleural fluid.    SAMEER WRIGHT MD

## 2018-04-01 NOTE — PLAN OF CARE
Problem: Patient Care Overview  Goal: Plan of Care/Patient Progress Review  Outcome: No Change  Pt alert. Aphasic, but follows basic directions/commands. Hx of TBI. VSS on RA. Total cares. Turn and repo q2h. NPO, GJ tube patent, tube feeding started at 1330, see MAR. Blanchable redness on buttocks, no open areas, mepilex border applied. Scabbing on L shin and R forearm d/t scratching per patients Mom. LS diminished.  Tele NSR. IVF infusing @ 125. IV abx. Mother stopped by for about 20 min. Will continue to monitor.

## 2018-04-01 NOTE — PROGRESS NOTES
[facility]  Hospitalist Progress Note        Erika Mccoy MD  04/01/2018        Interval History:      Fever is going down  Patient is feeling better  She usually responds by nodding his head and with facial expressions  Patient is aphasic         Assessment and Plan:      57 year old male with PMH significant for TBI and seizure disorder and dysphagia with G-tube for feeding, panhypopituitarism, hyponatremia, recent hospitalization on 2/14-2/21 due to sepsis from right lower lobe pneumonia was brought to the ER on 3/31 by EMS for evaluation of fever, and is being admitted to manage sepsis due to unclear source.  Patient also has a history of MRSA and VRE     Sepsis unclear source(but it could be from pneumonia as he has a past history and he is on feeding tube) clinically he is improving  Presented with fever 102.6, tachycardic to 110s, had leukocytosis but no specific system related symptoms noted.  Unclear source at this point. Chest x-ray, influenza screen, UA unremarkable.   -Blood cultures ×2 sent from ER NGTD he was started on vancomycin and Zosyn IV, continue  -Follow blood cultures and narrow antibiotics as able.  -Influenza negative  -Continue IV hydration,  -Continue to monitor on telemetry, and with continuous pulse ox.  -Clinically improving and fever is going down  -Lactic acid numbers have improved slightly and white count has gone down  -We will recheck tomorrow     TBI and seizure disorder  Dysphagia with G-tube for feeding  Extremity contrature  -Prior to admission Briviact and carbamazepine continued.  -Tube feeding to continue  -Nutrition consult for assessment of tube feeding and any further recommendation.  -SW and PT eval for dispo planning     Panhypopituitarism  -Patient is on steroid and thyroid replacement, resumed  -His blood pressure is stable, if becomes hypotensive, will place him on stress dose steroid.     Hyponatremia  Mild, likely due to dehydration.     -Sodium was low during his hospitalization last month as well.  -Monitor with IV hydration.      Thrombocytopenia:   -mild and is likely related to sepsis, monitor.     GERD:   -PTA PPI continued.     DVT Prophylaxis:   -Pneumatic Compression Devices and Lovenox    -Subcutaneous Lovenox    Code Status:   -Full Code     Disposition: Expected discharge: likely 3-4 days once fever resolves, source of infection identified, and abx narrowed down/changed to PO, inpatient orders placed.      His care was discussed with patient's RN                   Physical Exam:      Heart Rate: 66, Blood pressure 108/64, pulse 87, temperature 98.4  F (36.9  C), temperature source Axillary, resp. rate 18, weight 76.1 kg (167 lb 12.3 oz), SpO2 99 %.  Vitals:    03/31/18 2117 04/01/18 0643   Weight: 73 kg (161 lb) 76.1 kg (167 lb 12.3 oz)     Vital Signs with Ranges  Temp:  [98.2  F (36.8  C)-102.6  F (39.2  C)] 98.4  F (36.9  C)  Pulse:  [] 87  Heart Rate:  [] 66  Resp:  [16-23] 18  BP: (108-134)/(63-74) 108/64  SpO2:  [96 %-99 %] 99 %  I/O's Last 24 hours  I/O last 3 completed shifts:  In: 602 [I.V.:602]  Out: -     Constitutional:  He is alert awake and appears comfortable  He has a aphasia and communicate by nodding his head and via his facial expression   Lungs: Good air entry on both sides of lungs     Cardiovascular: S1 and S2 no S3      Abdomen: Abdomen soft no guarding ,no rigidity, bowel sounds are positive     Skin:  He has a scratch diamond on his lower extremity and upper extremity IV site and tube feeding site looks good    No other rashes noted   Other:           Medications:          piperacillin-tazobactam  3.375 g Intravenous Q6H     aspirin  81 mg Per J Tube Daily     carBAMazepine  150 mg Oral Q6H     fiber modular  1 packet Per Feeding Tube TID     levothyroxine  150 mcg Per J Tube QAM AC     pantoprazole  20 mg Per Feeding Tube Daily     cholecalciferol  2,000 Units Oral Daily     multivitamins with  minerals  15 mL Per J Tube Daily     hydrocortisone  10 mg Per J Tube QPM     hydrocortisone  20 mg Per J Tube QAM     vancomycin (VANCOCIN) IV  1,500 mg Intravenous Q12H     enoxaparin  40 mg Subcutaneous Q24H     Brivaracetam  100 mg Per Feeding Tube BID     PRN Meds: naloxone, melatonin, potassium chloride, potassium chloride, potassium chloride, potassium chloride with lidocaine, potassium chloride, acetaminophen, acetaminophen, polyethylene glycol, ondansetron **OR** ondansetron, albuterol, bacitracin, bisacodyl, melatonin         Data:      All new lab and imaging data was reviewed.   Recent Labs   Lab Test  04/01/18   0855  03/31/18   2144  02/21/18   0626   02/07/17   0452   01/30/17   0340   01/25/17   0426   WBC  10.0  13.8*  10.4   < >  11.7*   < >  10.7   < >  7.2   HGB  12.1*  13.5  13.3   < >  9.4*   < >  7.6*   < >  7.8*   MCV  89  89  88   < >  82   < >  81   < >  81   PLT  115*  137*  224   < >  363   < >  163   < >  162   INR   --    --    --    --   1.27*   --   1.32*   --   1.49*    < > = values in this interval not displayed.      Recent Labs   Lab Test  04/01/18   0855  03/31/18 2144 02/21/18   0626   NA  135  132*  129*   POTASSIUM  4.2  4.4  3.9   CHLORIDE  103  95  95   CO2  25  31  26   BUN  14  16  11   CR  0.97  0.95  1.02   ANIONGAP  7  6  8   STEVE  8.3*  9.0  8.5   GLC  75  92  88     Recent Labs   Lab Test  01/22/17   0500  01/21/17   2348  01/21/17   1600   TROPI  0.017  0.025  0.028

## 2018-04-02 ENCOUNTER — APPOINTMENT (OUTPATIENT)
Dept: GENERAL RADIOLOGY | Facility: CLINIC | Age: 58
DRG: 872 | End: 2018-04-02
Attending: INTERNAL MEDICINE
Payer: MEDICARE

## 2018-04-02 LAB
ANION GAP SERPL CALCULATED.3IONS-SCNC: 8 MMOL/L (ref 3–14)
BACTERIA SPEC CULT: NORMAL
BASOPHILS # BLD AUTO: 0.1 10E9/L (ref 0–0.2)
BASOPHILS NFR BLD AUTO: 1.1 %
BUN SERPL-MCNC: 13 MG/DL (ref 7–30)
CALCIUM SERPL-MCNC: 8.3 MG/DL (ref 8.5–10.1)
CHLORIDE SERPL-SCNC: 103 MMOL/L (ref 94–109)
CO2 SERPL-SCNC: 25 MMOL/L (ref 20–32)
CREAT SERPL-MCNC: 0.96 MG/DL (ref 0.66–1.25)
DIFFERENTIAL METHOD BLD: ABNORMAL
EOSINOPHIL # BLD AUTO: 0.9 10E9/L (ref 0–0.7)
EOSINOPHIL NFR BLD AUTO: 14.1 %
ERYTHROCYTE [DISTWIDTH] IN BLOOD BY AUTOMATED COUNT: 13.6 % (ref 10–15)
GFR SERPL CREATININE-BSD FRML MDRD: 81 ML/MIN/1.7M2
GLUCOSE SERPL-MCNC: 102 MG/DL (ref 70–99)
HCT VFR BLD AUTO: 35.5 % (ref 40–53)
HGB BLD-MCNC: 11.9 G/DL (ref 13.3–17.7)
IMM GRANULOCYTES # BLD: 0 10E9/L (ref 0–0.4)
IMM GRANULOCYTES NFR BLD: 0.2 %
LACTATE BLD-SCNC: 2.1 MMOL/L (ref 0.7–2)
LYMPHOCYTES # BLD AUTO: 1.9 10E9/L (ref 0.8–5.3)
LYMPHOCYTES NFR BLD AUTO: 30.6 %
Lab: NORMAL
MAGNESIUM SERPL-MCNC: 2.1 MG/DL (ref 1.6–2.3)
MCH RBC QN AUTO: 29.9 PG (ref 26.5–33)
MCHC RBC AUTO-ENTMCNC: 33.5 G/DL (ref 31.5–36.5)
MCV RBC AUTO: 89 FL (ref 78–100)
MONOCYTES # BLD AUTO: 0.8 10E9/L (ref 0–1.3)
MONOCYTES NFR BLD AUTO: 13.3 %
NEUTROPHILS # BLD AUTO: 2.5 10E9/L (ref 1.6–8.3)
NEUTROPHILS NFR BLD AUTO: 40.7 %
NRBC # BLD AUTO: 0 10*3/UL
NRBC BLD AUTO-RTO: 0 /100
PHOSPHATE SERPL-MCNC: 3.2 MG/DL (ref 2.5–4.5)
PLATELET # BLD AUTO: 118 10E9/L (ref 150–450)
POTASSIUM SERPL-SCNC: 4.1 MMOL/L (ref 3.4–5.3)
RBC # BLD AUTO: 3.98 10E12/L (ref 4.4–5.9)
SODIUM SERPL-SCNC: 136 MMOL/L (ref 133–144)
SPECIMEN SOURCE: NORMAL
WBC # BLD AUTO: 6.2 10E9/L (ref 4–11)

## 2018-04-02 PROCEDURE — 99207 ZZC CDG-MDM COMPONENT: MEETS LOW - DOWN CODED: CPT | Performed by: INTERNAL MEDICINE

## 2018-04-02 PROCEDURE — 27210995 ZZH RX 272: Performed by: HOSPITALIST

## 2018-04-02 PROCEDURE — 25000132 ZZH RX MED GY IP 250 OP 250 PS 637: Mod: GY

## 2018-04-02 PROCEDURE — 85025 COMPLETE CBC W/AUTO DIFF WBC: CPT | Performed by: INTERNAL MEDICINE

## 2018-04-02 PROCEDURE — 83735 ASSAY OF MAGNESIUM: CPT | Performed by: INTERNAL MEDICINE

## 2018-04-02 PROCEDURE — 25000132 ZZH RX MED GY IP 250 OP 250 PS 637: Mod: GY | Performed by: HOSPITALIST

## 2018-04-02 PROCEDURE — 71045 X-RAY EXAM CHEST 1 VIEW: CPT

## 2018-04-02 PROCEDURE — 27210429 ZZH NUTRITION PRODUCT INTERMEDIATE LITER

## 2018-04-02 PROCEDURE — 36415 COLL VENOUS BLD VENIPUNCTURE: CPT | Performed by: INTERNAL MEDICINE

## 2018-04-02 PROCEDURE — 80048 BASIC METABOLIC PNL TOTAL CA: CPT | Performed by: INTERNAL MEDICINE

## 2018-04-02 PROCEDURE — 25000128 H RX IP 250 OP 636: Performed by: HOSPITALIST

## 2018-04-02 PROCEDURE — 99232 SBSQ HOSP IP/OBS MODERATE 35: CPT | Performed by: INTERNAL MEDICINE

## 2018-04-02 PROCEDURE — 12000000 ZZH R&B MED SURG/OB

## 2018-04-02 PROCEDURE — 80202 ASSAY OF VANCOMYCIN: CPT | Performed by: HOSPITALIST

## 2018-04-02 PROCEDURE — 83605 ASSAY OF LACTIC ACID: CPT | Performed by: INTERNAL MEDICINE

## 2018-04-02 PROCEDURE — 84100 ASSAY OF PHOSPHORUS: CPT | Performed by: INTERNAL MEDICINE

## 2018-04-02 PROCEDURE — A9270 NON-COVERED ITEM OR SERVICE: HCPCS | Mod: GY | Performed by: HOSPITALIST

## 2018-04-02 RX ORDER — ALBUTEROL SULFATE 0.83 MG/ML
1 SOLUTION RESPIRATORY (INHALATION) EVERY 4 HOURS PRN
Status: DISCONTINUED | OUTPATIENT
Start: 2018-04-02 | End: 2018-04-04 | Stop reason: HOSPADM

## 2018-04-02 RX ORDER — ALBUTEROL SULFATE 0.83 MG/ML
1 SOLUTION RESPIRATORY (INHALATION) EVERY 4 HOURS PRN
Status: ON HOLD | COMMUNITY
End: 2019-03-12

## 2018-04-02 RX ADMIN — HYDROCORTISONE 20 MG: 10 TABLET ORAL at 08:33

## 2018-04-02 RX ADMIN — CARBAMAZEPINE 150 MG: 100 SUSPENSION ORAL at 21:13

## 2018-04-02 RX ADMIN — CARBAMAZEPINE 150 MG: 100 SUSPENSION ORAL at 02:09

## 2018-04-02 RX ADMIN — ENOXAPARIN SODIUM 40 MG: 40 INJECTION SUBCUTANEOUS at 08:49

## 2018-04-02 RX ADMIN — PIPERACILLIN SODIUM,TAZOBACTAM SODIUM 3.38 G: 3; .375 INJECTION, POWDER, FOR SOLUTION INTRAVENOUS at 17:59

## 2018-04-02 RX ADMIN — BRIVARACETAM 100 MG: 10 SOLUTION ORAL at 21:12

## 2018-04-02 RX ADMIN — ACETAMINOPHEN 650 MG: 325 TABLET, FILM COATED ORAL at 11:51

## 2018-04-02 RX ADMIN — PIPERACILLIN SODIUM,TAZOBACTAM SODIUM 3.38 G: 3; .375 INJECTION, POWDER, FOR SOLUTION INTRAVENOUS at 11:59

## 2018-04-02 RX ADMIN — LEVOTHYROXINE SODIUM 150 MCG: 150 TABLET ORAL at 06:29

## 2018-04-02 RX ADMIN — POTASSIUM CHLORIDE AND SODIUM CHLORIDE: 900; 150 INJECTION, SOLUTION INTRAVENOUS at 09:06

## 2018-04-02 RX ADMIN — PANTOPRAZOLE SODIUM 20 MG: 40 TABLET, DELAYED RELEASE ORAL at 08:34

## 2018-04-02 RX ADMIN — CARBAMAZEPINE 150 MG: 100 SUSPENSION ORAL at 13:44

## 2018-04-02 RX ADMIN — Medication 1 PACKET: at 16:45

## 2018-04-02 RX ADMIN — Medication 1 PACKET: at 11:45

## 2018-04-02 RX ADMIN — Medication 1 PACKET: at 21:13

## 2018-04-02 RX ADMIN — MULTIVITAMIN 15 ML: LIQUID ORAL at 08:34

## 2018-04-02 RX ADMIN — PIPERACILLIN SODIUM,TAZOBACTAM SODIUM 3.38 G: 3; .375 INJECTION, POWDER, FOR SOLUTION INTRAVENOUS at 05:58

## 2018-04-02 RX ADMIN — VANCOMYCIN HYDROCHLORIDE 1500 MG: 5 INJECTION, POWDER, LYOPHILIZED, FOR SOLUTION INTRAVENOUS at 01:00

## 2018-04-02 RX ADMIN — HYDROCORTISONE 10 MG: 10 TABLET ORAL at 21:13

## 2018-04-02 RX ADMIN — PIPERACILLIN SODIUM,TAZOBACTAM SODIUM 3.38 G: 3; .375 INJECTION, POWDER, FOR SOLUTION INTRAVENOUS at 00:15

## 2018-04-02 RX ADMIN — PIPERACILLIN SODIUM,TAZOBACTAM SODIUM 3.38 G: 3; .375 INJECTION, POWDER, FOR SOLUTION INTRAVENOUS at 23:31

## 2018-04-02 RX ADMIN — ASPIRIN 81 MG 81 MG: 81 TABLET ORAL at 08:33

## 2018-04-02 RX ADMIN — BRIVARACETAM 100 MG: 10 SOLUTION ORAL at 08:52

## 2018-04-02 RX ADMIN — CARBAMAZEPINE 150 MG: 100 SUSPENSION ORAL at 08:34

## 2018-04-02 RX ADMIN — POTASSIUM CHLORIDE AND SODIUM CHLORIDE: 900; 150 INJECTION, SOLUTION INTRAVENOUS at 17:58

## 2018-04-02 RX ADMIN — VITAMIN D, TAB 1000IU (100/BT) 2000 UNITS: 25 TAB at 08:33

## 2018-04-02 ASSESSMENT — ACTIVITIES OF DAILY LIVING (ADL)
DRESS: 3-->ASSISTIVE EQUIPMENT AND PERSON
BATHING: 3-->ASSISTIVE EQUIPMENT AND PERSON
TRANSFERRING: 4-->COMPLETELY DEPENDENT
TOILETING: 4-->COMPLETELY DEPENDENT
RETIRED_EATING: 3-->ASSISTIVE EQUIPMENT AND PERSON
RETIRED_COMMUNICATION: 2-->DIFFICULTY SPEAKING (NOT RELATED TO LANGUAGE BARRIER)
AMBULATION: 4-->COMPLETELY DEPENDENT
SWALLOWING: 2-->DIFFICULTY SWALLOWING LIQUIDS
COGNITION: 2 - DIFFICULTY WITH ORGANIZING THOUGHTS
FALL_HISTORY_WITHIN_LAST_SIX_MONTHS: NO

## 2018-04-02 NOTE — PROGRESS NOTES
[facility]    Hospitalist Progress Note    Assessment & Plan   57 year old male with PMH significant for TBI and seizure disorder and dysphagia with G-tube for feeding, panhypopituitarism, hyponatremia, recent hospitalization on 2/14-2/21 due to sepsis from right lower lobe pneumonia was brought to the ER on 3/31 by EMS for evaluation of fever, and is being admitted to manage sepsis due to unclear source.     Sepsis from unclear source:  Presented with fever 102.6, tachycardic to 110s, had leukocytosis but no specific system related symptoms.    -Unclear source at this point.   -Chest x-ray, influenza screen, UA unremarkable.   -Blood cultures-NGTD  -Repeat chest x-ray today (after hydration)  -Clinically improving; afebrile  -Leukocytosis resolved  -Discontinue vancomycin  -Continue Zosyn for today      TBI and seizure disorder  Dysphagia with G-tube for feeding  Extremity contrature  -Prior to admission Briviact and carbamazepine continued.  -Continue tube feeding  -SW and PT eval for dispo planning      Panhypopituitarism  -Continue prior to admission steroid and thyroid replacement      Hyponatremia  Mild, likely due to dehydration.    -Resolved      Thrombocytopenia:   -mild and is likely related to sepsis, monitor.      GERD:   -PTA PPI continued.    # Pain Assessment:  Current Pain Score 4/2/2018   Patient currently in pain? denies   Pain score (0-10) -   Pain location -   Pain descriptors -   rFLACC pain score -   CPOT pain score -   Keyon s pain level was assessed and he currently denies pain.        D/W: RN  DVT Prophylaxis: Pneumatic Compression Devices  Code Status: Full Code    Disposition: Expected discharge in 1-2 days    Migel Stoddard MD    Interval History   Afebrile.  No new culture data.  Patient is nonverbal, no acute concerns per nursing report    -Data reviewed today: I reviewed all new labs and imaging results over the last 24 hours. I personally reviewed no images or EKG's  today.      Physical Exam   Temp: 98  F (36.7  C) Temp src: Oral BP: 114/55 Pulse: 53 Heart Rate: 67 Resp: 16 SpO2: 99 % O2 Device: None (Room air)    Vitals:    03/31/18 2117 04/01/18 0643   Weight: 73 kg (161 lb) 76.1 kg (167 lb 12.3 oz)     Vital Signs with Ranges  Temp:  [97.8  F (36.6  C)-98  F (36.7  C)] 98  F (36.7  C)  Pulse:  [53] 53  Heart Rate:  [67] 67  Resp:  [16-18] 16  BP: (101-114)/(51-63) 114/55  SpO2:  [95 %-99 %] 99 %  I/O last 3 completed shifts:  In: 2978 [I.V.:1843; NG/GT:270]  Out: 1150 [Urine:1150]    Constitutional: Awake, aphasic, follows 1 or 2 simple commands, appears to be comfortable  HEENT: Moist oral mucosa, no oral lesions, No pallor or icterus  Neck- Supple, Good ROM, No JVD  Respiratory: Poor inspiratory effort, normal work of breathing  Cardiovascular: RRR, No murmur  GI: Soft, Non- tender, G-tube in place  Skin/Integument: Warm and dry, no rashes  MSK: Contractures of right upper extremity  Neuro:        Medications       0.9% sodium chloride + KCl 20 mEq/L 125 mL/hr at 04/02/18 0906         piperacillin-tazobactam  3.375 g Intravenous Q6H     aspirin  81 mg Per J Tube Daily     carBAMazepine  150 mg Oral Q6H     fiber modular  1 packet Per Feeding Tube TID     levothyroxine  150 mcg Per J Tube QAM AC     pantoprazole  20 mg Per Feeding Tube Daily     cholecalciferol  2,000 Units Oral Daily     multivitamins with minerals  15 mL Per J Tube Daily     hydrocortisone  10 mg Per J Tube QPM     hydrocortisone  20 mg Per J Tube QAM     vancomycin (VANCOCIN) IV  1,500 mg Intravenous Q12H     enoxaparin  40 mg Subcutaneous Q24H     Brivaracetam  100 mg Per Feeding Tube BID       Data       Recent Labs  Lab 04/02/18  0850 04/01/18  0855 03/31/18  2144   WBC 6.2 10.0 13.8*   HGB 11.9* 12.1* 13.5   MCV 89 89 89   * 115* 137*    135 132*   POTASSIUM 4.1 4.2 4.4   CHLORIDE 103 103 95   CO2 25 25 31   BUN 13 14 16   CR 0.96 0.97 0.95   ANIONGAP 8 7 6   STEVE 8.3* 8.3* 9.0   *  75 92   ALBUMIN  --   --  3.6   PROTTOTAL  --   --  8.4   BILITOTAL  --   --  0.3   ALKPHOS  --   --  109   ALT  --   --  26   AST  --   --  19       No results found for this or any previous visit (from the past 24 hour(s)).

## 2018-04-02 NOTE — PROGRESS NOTES
[facility] Progress Note  [facility]    SH, referral visit. The patient gave a thumbs up to the 's offer to have Father Fehn visit for  support.     SH provided care in presence and processed referral for .       will follow as LOS persists.       Rikki Willams  Chaplain Resident

## 2018-04-02 NOTE — PLAN OF CARE
Problem: Patient Care Overview  Goal: Plan of Care/Patient Progress Review  Outcome: No Change  Pt alert. Aphasic, but follows basic directions/commands. VSS on RA, ex BP soft. Afebrile. Total cares. Turn and repo q2h.  Incontinent of urine, external catheter applied. NPO-GJ tube patent, Continuous feedings. Blanchable redness on buttocks, mepilex CDI. LS diminished.  Tele NSR. IVF infusing @ 125. IV abx. Contact Iso maintained.

## 2018-04-02 NOTE — PHARMACY-ADMISSION MEDICATION HISTORY
Admission medication history interview status for the 3/31/2018  admission is complete. See EPIC admission navigator for prior to admission medications     Medication history source reliability:Moderate    Actions taken by pharmacist (provider contacted, etc): information from discharge avs 3-13-18     Additional medication history information not noted on PTA med list :None    Medication reconciliation/reorder completed by provider prior to medication history? No    Time spent in this activity: 15 min    Prior to Admission medications    Medication Sig Last Dose Taking? Auth Provider   albuterol (2.5 MG/3ML) 0.083% neb solution Take 1 vial by nebulization every 4 hours as needed for shortness of breath / dyspnea or wheezing  Yes Unknown, Entered By History   nystatin (MYCOSTATIN) 553050 UNIT/GM POWD Apply three times daily to the groin for 5 days.  Yes Yogi Irizarry MD   carBAMazepine (TEGRETOL) 100 MG/5ML suspension Take 150 mg by mouth every 6 hours  Yes Unknown, Entered By History   melatonin (MELATONIN) 1 MG/ML LIQD liquid 6 mg by Jejunal Tube route nightly as needed for sleep  Yes Unknown, Entered By History   Brivaracetam (BRIVIACT) 10 MG/ML solution Take 100 mg by mouth 2 times daily  Yes Reported, Patient   pantoprazole (PROTONIX) SUSP suspension Take 20 mg by mouth daily   Yes Reported, Patient   potassium & sodium phosphates (NEUTRA-PHOS) 280-160-250 MG Packet Take 1 packet by mouth 3 times daily 0900, 1500, 2100.   Yes Unknown, Entered By History   VITAMIN D, CHOLECALCIFEROL, PO Take 2,000 Units by mouth daily  Yes Unknown, Entered By History   bacitracin ointment Apply topically daily as needed for wound care To PEG site.  Yes Unknown, Entered By History   aspirin 10 mg/mL 8.1 mLs (81 mg) by Per J Tube route daily  Yes Mariana Venegas MD   multivitamins with minerals (CERTAVITE/CEROVITE) LIQD liquid 15 mLs by Per J Tube route daily  Yes Mariana Venegas MD   calcium carbonate  1250 (500 CA) MG/5ML SUSP suspension 5 mLs (1,250 mg) by Per J Tube route 3 times daily (with meals)  Yes Mariana Venegas MD   levothyroxine (SYNTHROID) 25 mcg/mL 6 mLs (150 mcg) by Per J Tube route every morning (before breakfast)  Patient taking differently: 150 mcg by Per J Tube route every morning (before breakfast) Hold tube feeding 1 hour prior and 1 hour after medication administered.  Yes Mariana Venegas MD   hydrocortisone (CORTEF) 2 mg/mL 10 mLs (20 mg) by Per J Tube route every morning  Yes Mariana Venegas MD   hydrocortisone (CORTEF) 2 mg/mL Take 5 mLs (10 mg) by mouth every evening  Yes Mariana Venegas MD   bisacodyl (DULCOLAX) 10 MG Suppository Place 1 suppository (10 mg) rectally daily as needed for constipation  Yes Mariana Venegas MD   miconazole (MICATIN; MICRO GUARD) 2 % powder Apply topically every hour as needed for other (topical candidiasis)  Yes Alicia Roca APRN CNP   ACETAMINOPHEN  mg by Per Feeding Tube route every 4 hours as needed for pain  Yes Unknown, Entered By History   testosterone cypionate (DEPOTESTOTERONE CYPIONATE) 200 MG/ML injection Inject 100 mg into the muscle See Admin Instructions Every 2-3 weeks.  Yes Unknown, Entered By History   fiber modular (NUTRISOURCE FIBER) packet 1 packet by Per Feeding Tube route 3 times daily   Mariana Venegas MD

## 2018-04-02 NOTE — PLAN OF CARE
Problem: Patient Care Overview  Goal: Plan of Care/Patient Progress Review  Outcome: No Change  HECTOR orientation- pt is aphasic, but able to make needs known with responses to yes/no questions. VSS on RA. Denies pain. T/R q2h; total cares. IVF infusing at 125ml/hr; continues on IV abx. LS diminished. NPO; GJ tube running continuous feedings. Coccyx dressing CDI. Contact iso maintained. Tele NSR. Continue to monitor.

## 2018-04-02 NOTE — PLAN OF CARE
Problem: Patient Care Overview  Goal: Plan of Care/Patient Progress Review  Outcome: No Change  Pt aphasic, but can say yes or no through hand signals. Blanchable redness with blister on coccyx. mepilex applied. GJ tube CDI on continuous feed with flushes q4hr. Position q2hr. Repeat chest x-ray completed to find source of infection. Lung sounds diminished but clear. NS at 125ml/hr

## 2018-04-03 PROCEDURE — 25000128 H RX IP 250 OP 636: Performed by: HOSPITALIST

## 2018-04-03 PROCEDURE — 12000000 ZZH R&B MED SURG/OB

## 2018-04-03 PROCEDURE — 27210995 ZZH RX 272: Performed by: HOSPITALIST

## 2018-04-03 PROCEDURE — A9270 NON-COVERED ITEM OR SERVICE: HCPCS | Mod: GY | Performed by: HOSPITALIST

## 2018-04-03 PROCEDURE — 25000132 ZZH RX MED GY IP 250 OP 250 PS 637: Mod: GY

## 2018-04-03 PROCEDURE — 25000132 ZZH RX MED GY IP 250 OP 250 PS 637: Mod: GY | Performed by: HOSPITALIST

## 2018-04-03 PROCEDURE — 27210429 ZZH NUTRITION PRODUCT INTERMEDIATE LITER

## 2018-04-03 PROCEDURE — 99232 SBSQ HOSP IP/OBS MODERATE 35: CPT | Performed by: INTERNAL MEDICINE

## 2018-04-03 PROCEDURE — G0463 HOSPITAL OUTPT CLINIC VISIT: HCPCS

## 2018-04-03 RX ORDER — AMOXICILLIN AND CLAVULANATE POTASSIUM 400; 57 MG/5ML; MG/5ML
600 POWDER, FOR SUSPENSION ORAL EVERY 12 HOURS
Status: DISCONTINUED | OUTPATIENT
Start: 2018-04-03 | End: 2018-04-04 | Stop reason: HOSPADM

## 2018-04-03 RX ORDER — AMOXICILLIN AND CLAVULANATE POTASSIUM 600; 42.9 MG/5ML; MG/5ML
600 POWDER, FOR SUSPENSION ORAL EVERY 12 HOURS SCHEDULED
Status: DISCONTINUED | OUTPATIENT
Start: 2018-04-03 | End: 2018-04-03

## 2018-04-03 RX ADMIN — Medication 1 PACKET: at 16:41

## 2018-04-03 RX ADMIN — ASPIRIN 81 MG 81 MG: 81 TABLET ORAL at 08:22

## 2018-04-03 RX ADMIN — BRIVARACETAM 100 MG: 10 SOLUTION ORAL at 08:57

## 2018-04-03 RX ADMIN — POTASSIUM CHLORIDE AND SODIUM CHLORIDE: 900; 150 INJECTION, SOLUTION INTRAVENOUS at 04:11

## 2018-04-03 RX ADMIN — CARBAMAZEPINE 150 MG: 100 SUSPENSION ORAL at 14:11

## 2018-04-03 RX ADMIN — CARBAMAZEPINE 150 MG: 100 SUSPENSION ORAL at 20:51

## 2018-04-03 RX ADMIN — VITAMIN D, TAB 1000IU (100/BT) 2000 UNITS: 25 TAB at 08:22

## 2018-04-03 RX ADMIN — BRIVARACETAM 100 MG: 10 SOLUTION ORAL at 20:51

## 2018-04-03 RX ADMIN — HYDROCORTISONE 10 MG: 10 TABLET ORAL at 20:51

## 2018-04-03 RX ADMIN — LEVOTHYROXINE SODIUM 150 MCG: 150 TABLET ORAL at 06:35

## 2018-04-03 RX ADMIN — AMOXICILLIN AND CLAVULANATE POTASSIUM 600 MG: 400; 57 POWDER, FOR SUSPENSION ORAL at 16:41

## 2018-04-03 RX ADMIN — MULTIVITAMIN 15 ML: LIQUID ORAL at 08:23

## 2018-04-03 RX ADMIN — CARBAMAZEPINE 150 MG: 100 SUSPENSION ORAL at 02:07

## 2018-04-03 RX ADMIN — HYDROCORTISONE 20 MG: 10 TABLET ORAL at 08:22

## 2018-04-03 RX ADMIN — PANTOPRAZOLE SODIUM 20 MG: 40 TABLET, DELAYED RELEASE ORAL at 08:23

## 2018-04-03 RX ADMIN — PIPERACILLIN SODIUM,TAZOBACTAM SODIUM 3.38 G: 3; .375 INJECTION, POWDER, FOR SOLUTION INTRAVENOUS at 05:02

## 2018-04-03 RX ADMIN — ENOXAPARIN SODIUM 40 MG: 40 INJECTION SUBCUTANEOUS at 08:32

## 2018-04-03 RX ADMIN — Medication 1 PACKET: at 08:57

## 2018-04-03 RX ADMIN — CARBAMAZEPINE 150 MG: 100 SUSPENSION ORAL at 08:23

## 2018-04-03 RX ADMIN — Medication 1 PACKET: at 21:08

## 2018-04-03 NOTE — PROGRESS NOTES
[facility]    Hospitalist Progress Note    Assessment & Plan   57 year old male with PMH significant for TBI and seizure disorder and dysphagia with G-tube for feeding, panhypopituitarism, hyponatremia, recent hospitalization on 2/14-2/21 due to sepsis from right lower lobe pneumonia was brought to the ER on 3/31 by EMS for evaluation of fever, and is being admitted to manage sepsis due to unclear source.     Sepsis from unclear source:  Presented with fever 102.6, tachycardic to 110s, had leukocytosis but no specific system related symptoms.    -Unclear source at this point.   -Chest x-ray, influenza screen, UA unremarkable.   -Blood cultures-NGTD  -Clinically improving; afebrile  -?  Viral illness  -Leukocytosis resolved  -Initially on vancomycin and Zosyn  -Discontinue Zosyn today, transition to oral Augmentin, if no source apparent anticipate total 7 days course    TBI and seizure disorder  Dysphagia with G-tube for feeding  -Prior to admission Briviact and carbamazepine continued.  -Continue tube feeding      Panhypopituitarism  -Continue prior to admission steroid and thyroid replacement      Thrombocytopenia:   -mild and is likely related to sepsis, stable      GERD:   -continue PTA PPI     # Pain Assessment:  Current Pain Score 4/3/2018   Patient currently in pain? yes   Pain score (0-10) -   Pain location Leg   Pain descriptors -   rFLACC pain score -   CPOT pain score -   Keyon s pain level was assessed and he currently denies pain.        D/W: RN  DVT Prophylaxis: Pneumatic Compression Devices  Code Status: Full Code    Disposition: Expected discharge 4/4    Migel Stoddard MD    Interval History   Afebrile.  No new complaints.      -Data reviewed today: I reviewed all new labs and imaging results over the last 24 hours. I personally reviewed no images or EKG's today.      Physical Exam   Temp: 97.9  F (36.6  C) Temp src: Oral BP: 124/73 Pulse: 70 Heart Rate: 51 Resp: 12 SpO2: 97 % O2  Device: None (Room air)    Vitals:    03/31/18 2117 04/01/18 0643   Weight: 73 kg (161 lb) 76.1 kg (167 lb 12.3 oz)     Vital Signs with Ranges  Temp:  [96.8  F (36  C)-97.9  F (36.6  C)] 97.9  F (36.6  C)  Pulse:  [70] 70  Heart Rate:  [51-55] 51  Resp:  [12-19] 12  BP: (117-142)/(62-73) 124/73  SpO2:  [97 %-99 %] 97 %  I/O last 3 completed shifts:  In: 1972 [I.V.:1134; NG/GT:570]  Out: -     Constitutional: Awake, aphasic, follows 1 or 2 simple commands, appears to be comfortable  Respiratory: Poor inspiratory effort, normal work of breathing  Cardiovascular: RRR, No murmur  GI: Soft, Non- tender, G-tube in place  Skin/Integument: Warm and dry, no rashes  MSK: Contractures of right upper extremity  Neuro: Awake, nonverbal at baseline      Medications           amoxicillin-clavulanate  600 mg Oral Q12H     aspirin  81 mg Per J Tube Daily     carBAMazepine  150 mg Oral Q6H     fiber modular  1 packet Per Feeding Tube TID     levothyroxine  150 mcg Per J Tube QAM AC     pantoprazole  20 mg Per Feeding Tube Daily     cholecalciferol  2,000 Units Oral Daily     multivitamins with minerals  15 mL Per J Tube Daily     hydrocortisone  10 mg Per J Tube QPM     hydrocortisone  20 mg Per J Tube QAM     enoxaparin  40 mg Subcutaneous Q24H     Brivaracetam  100 mg Per Feeding Tube BID       Data       Recent Labs  Lab 04/02/18  0850 04/01/18  0855 03/31/18  2144   WBC 6.2 10.0 13.8*   HGB 11.9* 12.1* 13.5   MCV 89 89 89   * 115* 137*    135 132*   POTASSIUM 4.1 4.2 4.4   CHLORIDE 103 103 95   CO2 25 25 31   BUN 13 14 16   CR 0.96 0.97 0.95   ANIONGAP 8 7 6   STEVE 8.3* 8.3* 9.0   * 75 92   ALBUMIN  --   --  3.6   PROTTOTAL  --   --  8.4   BILITOTAL  --   --  0.3   ALKPHOS  --   --  109   ALT  --   --  26   AST  --   --  19       No results found for this or any previous visit (from the past 24 hour(s)).

## 2018-04-03 NOTE — PLAN OF CARE
Problem: Patient Care Overview  Goal: Plan of Care/Patient Progress Review  Outcome: Improving  Alert but non verbal at baseline. Able to shake head yes and no to questions. Denies pain, VSS on RA. Tele was SB. On continuous tube feeding at 60mL/hr. J-tube patent and the site look c/d/i. On IV abx and IVF running at 125mL/hr. Foam dressing on the coccyx. Turned and repoed Q 2hrs. WOC consult pending. Second PIV saline locked. Incontinent of urine this shift. D/c pending PT and social work eval. Continue to monitor.

## 2018-04-03 NOTE — PROGRESS NOTES
"D;  Coccyx, sacrum intact epidermis with persistent red erythema in a mirrored pattern on, fleshy buttocks of the distal coccyx area.  Some pain with palpation, assessment.   I;; assessed with RN as noted above  A:  No evidence of pressure injury at this time.   P;;    Pressure INJURY Prevention Measures (PIP):  If pt is refusing to turn or reposition they must be educated on the  potential injury from not off loading pressure.  Then this \"educated refusal\" needs to be documented as an \"educated refusal to turn/ reposition\" and document if alert, etc.    1. Repositioning:  Pt must be repositioned for good skin health.  If pt refusing or this is not being done then the charge nurse, nurse manager and md need to be notified to help intervene and to know that there is an issue    Bed:  Reposition pt MINIMALLY every 1-2 hours in bed to relieve pressure and promote perfusion to tissue. Also try to position to get airflow to pt s backside, etc. If necessary consider use of Fluidized Positioner Pads ((190031 small/ [id number] med/ [id number] large) to help maintain pt in good offloading-position.               Use pillows in the lower back and upper thighs to support postioning but keep pillows off butt to minimize any pressure.    Chair:  When up to chair pt should not sit for longer than one hour total before either standing or returning to bed for at least 10 minutes, again to relieve pressure and promote perfusion to tissue.     o Pt should also sit on a chair cushion (#[id number]) when up to the chair.  o Do NOT use a donut to sit on.  This concentrates pressure in a smaller area and creates a higher potential for injury where the cushion is.   o When pt returns to bed he/she should be positioned on side  o Do not sit on a Z-Flow Pad.  This is not a chair cushion, it is for positioning  If pt is refusing to turn or reposition they must be educated on the  potential injury from not off loading pressure.  Then this \"educated " "refusal\" needs to be documented as an \"educated refusal to turn/ reposition\" and document if alert, etc.  2.  Patient's skin must be kept clean and dry- the areas are only clean when you see the base of the skin fold, especially the perineal area.  Moist, macerated tissue is more susceptible to injury.           Follow Incontinence Protocol found in [employer].  Dust with baby powder BID to help with chaffing, decrease warmth from friction and increase comfort.         NO BRIEFS WITH CATHETERS  3.  Follow the bed algorithm to consider a specialty mattress  4.  If able keep head of bed below 30 degrees.   5.  Follow Ricci Risk recommendations  6.  Be aware of the bony prominences!    Elevate heels at all times, consider using heel lift boots, Farhan or Rooke    Apply skin prep to bony prominences such as elbows, heels, hips- and consider wearing long sleeves and/or pants at al times  7.  Remember to perform full skin inspection 2 x / day- unless ordered NOT to order an area skin must be assessed.  If not able to do a full skin inspection then document full inspection along with the exception of what was not assessed.       "

## 2018-04-03 NOTE — PLAN OF CARE
Problem: Patient Care Overview  Goal: Plan of Care/Patient Progress Review  Outcome: Improving  Pt alert. Aphasic, but follows basic directions/commands, baseline for pt. VSS on RA. Total cares. Turn and repo q2h. NPO, GJ tube patent, tube feeding continuous, stopped at 0530 to administer synthroid (see MAR). Mepilex border CDI at sacrum. Scabbing on L shin and R forearm present, no new areas. Mits applied at 0145 d/t pt grabbing penis out of brief and urinating on self. LS diminished.  Tele NSR. IVF infusing @ 125. IV Zosyn given. Contact Iso maintained. Will continue to monitor.

## 2018-04-04 VITALS
TEMPERATURE: 97.6 F | RESPIRATION RATE: 14 BRPM | SYSTOLIC BLOOD PRESSURE: 110 MMHG | WEIGHT: 160 LBS | BODY MASS INDEX: 21.7 KG/M2 | OXYGEN SATURATION: 98 % | DIASTOLIC BLOOD PRESSURE: 69 MMHG | HEART RATE: 60 BPM

## 2018-04-04 PROCEDURE — 25000132 ZZH RX MED GY IP 250 OP 250 PS 637: Mod: GY | Performed by: HOSPITALIST

## 2018-04-04 PROCEDURE — 27210995 ZZH RX 272: Performed by: HOSPITALIST

## 2018-04-04 PROCEDURE — 25000128 H RX IP 250 OP 636: Performed by: HOSPITALIST

## 2018-04-04 PROCEDURE — 27210429 ZZH NUTRITION PRODUCT INTERMEDIATE LITER

## 2018-04-04 PROCEDURE — 25000132 ZZH RX MED GY IP 250 OP 250 PS 637: Mod: GY

## 2018-04-04 PROCEDURE — A9270 NON-COVERED ITEM OR SERVICE: HCPCS | Mod: GY | Performed by: HOSPITALIST

## 2018-04-04 PROCEDURE — 99239 HOSP IP/OBS DSCHRG MGMT >30: CPT | Performed by: INTERNAL MEDICINE

## 2018-04-04 RX ORDER — AMOXICILLIN AND CLAVULANATE POTASSIUM 400; 57 MG/5ML; MG/5ML
600 POWDER, FOR SUSPENSION ORAL EVERY 12 HOURS
Qty: 45 ML | Refills: 0 | Status: SHIPPED | OUTPATIENT
Start: 2018-04-04 | End: 2019-01-31

## 2018-04-04 RX ADMIN — LEVOTHYROXINE SODIUM 150 MCG: 150 TABLET ORAL at 06:40

## 2018-04-04 RX ADMIN — MULTIVITAMIN 15 ML: LIQUID ORAL at 08:46

## 2018-04-04 RX ADMIN — VITAMIN D, TAB 1000IU (100/BT) 2000 UNITS: 25 TAB at 08:46

## 2018-04-04 RX ADMIN — AMOXICILLIN AND CLAVULANATE POTASSIUM 600 MG: 400; 57 POWDER, FOR SUSPENSION ORAL at 12:50

## 2018-04-04 RX ADMIN — ENOXAPARIN SODIUM 40 MG: 40 INJECTION SUBCUTANEOUS at 08:45

## 2018-04-04 RX ADMIN — Medication 1 PACKET: at 14:33

## 2018-04-04 RX ADMIN — Medication 1 PACKET: at 08:46

## 2018-04-04 RX ADMIN — HYDROCORTISONE 20 MG: 10 TABLET ORAL at 08:45

## 2018-04-04 RX ADMIN — PANTOPRAZOLE SODIUM 20 MG: 40 TABLET, DELAYED RELEASE ORAL at 08:46

## 2018-04-04 RX ADMIN — ASPIRIN 81 MG 81 MG: 81 TABLET ORAL at 08:46

## 2018-04-04 RX ADMIN — BRIVARACETAM 100 MG: 10 SOLUTION ORAL at 09:16

## 2018-04-04 RX ADMIN — CARBAMAZEPINE 150 MG: 100 SUSPENSION ORAL at 01:32

## 2018-04-04 RX ADMIN — AMOXICILLIN AND CLAVULANATE POTASSIUM 600 MG: 400; 57 POWDER, FOR SUSPENSION ORAL at 00:36

## 2018-04-04 RX ADMIN — CARBAMAZEPINE 150 MG: 100 SUSPENSION ORAL at 08:46

## 2018-04-04 RX ADMIN — CARBAMAZEPINE 150 MG: 100 SUSPENSION ORAL at 14:33

## 2018-04-04 NOTE — PROGRESS NOTES
Discharge    Patient discharged to home via .  Care plan note:  Afebrile; LS diminished with O2sats 97-98% RA; no SOB or wheezes.  Patient denies pain/N/V.  NPO with frequent oral cares.  TF is infusing at goal rate of 60cc/hr.  Patient is aphasic but responds appropriately by shaking/nodding head; follows simple directions.  Blanchable redness on coccyx and scabs on BUE/BLE noted; otherwise, skin is intact.  DC instructions and prescriptions were provided.  Patient's mom Savannah verbalized understanding of all information received.  Patient is DC'd home via .    Listed belongings gathered and returned to patient. Yes  Care Plan and Patient education resolved: Yes  Prescriptions if needed, hard copies sent with patient  Yes   Home and hospital acquired medications returned to patient:  NA  Medication Bin checked and emptied on discharge Yes  Follow up appointment made for patient: No - patient's mother Savannah will make the appointment.

## 2018-04-04 NOTE — PROGRESS NOTES
Spoke with patient's mother Savannah.  He is open to [facility] [phone]-has tube fdg therapies RN and pca. Savannah has called them to notify the patient's  Return home. She wants transportation provided and I arranged [facility]-cost given at $65 /$4.60 per mile. Savannah agreeable to this. Patient has ramp into the house from the garage-ride set up for 5:45 pm tonight. I will fax orders to [facility] and to his pmd.

## 2018-04-04 NOTE — PLAN OF CARE
Problem: Patient Care Overview  Goal: Plan of Care/Patient Progress Review  Outcome: No Change  Alert but aphasic at baseline. Able to shake head yes and no to questions. Denies pain, VSS on RA. Tele was SB with occasional PACs. On continuous tube feeding at 60mL/hr. J-tube patent and the site look c/d/i. On PO Augmentin now and 2 PIVs saline locked. Blanchable redness on the coccyx covered with powder. Turned and repoed Q 2hrs. WOC orders in. Incontinent of urine with large amount each time requiring linen change. Up with ceiling lift. D/c expected tomorrow. Continue to monitor.

## 2018-04-04 NOTE — DISCHARGE SUMMARY
[facility]    Discharge Summary  Hospitalist    Date of Admission:  3/31/2018  Date of Discharge:  4/4/2018  Discharging Provider: Migel Stoddard MD    Discharge Diagnoses      Sepsis from unclear source   TBI   Seizure disorder  Dysphagia with G-tube for feeding  Panhypopituitarism  Thrombocytopenia    Hospital Course   57 year old male with PMH significant for TBI and seizure disorder and dysphagia with G-tube for feeding, panhypopituitarism, hyponatremia, recent hospitalization on 2/14-2/21 due to sepsis from right lower lobe pneumonia was brought to the ER on 3/31 by EMS for evaluation of fever, and is being admitted to manage sepsis due to unclear source.      Sepsis from unclear source:  Presented with fever 102.6, tachycardic to 110s, had leukocytosis but no specific system related symptoms.    -Unclear source    -Chest x-ray, influenza screen, UA unremarkable.   -Blood cultures-NGTD  -Clinically improving; afebrile  -?  Viral illness  -Leukocytosis resolved  -Initially on vancomycin and Zosyn  -Discontinue Zosyn today, transition to oral Augmentin, total 7 days course.(3 more days)     TBI and seizure disorder  Dysphagia with G-tube for feeding  -Prior to admission Briviact and carbamazepine continued.  -Continue tube feeding      Panhypopituitarism  -Continue prior to admission steroid and thyroid replacement      Thrombocytopenia:   -mild and is likely related to sepsis, stable      # Discharge Pain Plan:   - Patient currently has NO PAIN and is not being prescribed pain medications on discharge.      Migel Stoddard MD    Significant Results and Procedures   See bwlow    Pending Results   none  Unresulted Labs Ordered in the Past 30 Days of this Admission     Date and Time Order Name Status Description    3/31/2018 2153 Blood culture Preliminary     3/31/2018 2153 Blood culture Preliminary           Code Status   Full Code       Primary Care Physician   Carlos Gomez    Physical Exam    Temp: 97.7  F (36.5  C) Temp src: Oral BP: 107/60 Pulse: 60 Heart Rate: 62 Resp: 16 SpO2: 97 % O2 Device: None (Room air)      Constitutional: Non-verbal, NAD  Respiratory: CTA B/L, Normal WOB  Cardiovascular: RRR, No murmur  GI: Soft, Non- tender, BS- normoactive, G-tube in place; site without evidence of infection  Skin/Integument: Warm and dry, no rashes      Discharge Disposition   Discharged to home  Condition at discharge: Stable    Consultations This Hospital Stay   PHARMACY TO DOSE [facility]  SOCIAL WORK IP CONSULT  PHARMACY TO DOSE [facility]  NUTRITION SERVICES ADULT IP CONSULT  PHARMACY IP CONSULT  WOUND OSTOMY CONTINENCE NURSE  IP CONSULT    Time Spent on this Encounter   I, Migel Stoddard, personally saw the patient today and spent greater than 30 minutes discharging this patient.    Discharge Orders     Reason for your hospital stay   Sepsis; unclear source     Follow-up and recommended labs and tests   Follow up with primary care provider, Carlos Gomez, within 7 days for hospital follow- up.     Activity   Your activity upon discharge: activity as tolerated     Full Code     Diet   Follow this diet upon discharge: Orders Placed This Encounter     Adult Formula Drip Feeding: Continuous Isosource 1.5; Jejunostomy; Goal Rate: 60; mL/hr; Medication - Tube Feeding Flush Frequency: At least 15-30 mL water before and after medication administration and with tube clogging; Amout to Send (Nutrition...     NPO for Medical/Clinical Reasons Except for: No Exceptions         Discharge Medications   Current Discharge Medication List      START taking these medications    Details   amoxicillin-clavulanate (AUGMENTIN) 400-57 MG/5ML suspension Take 7.5 mLs (600 mg) by mouth every 12 hours for 3 days  Qty: 45 mL, Refills: 0    Associated Diagnoses: Sepsis, due to unspecified organism (H)         CONTINUE these medications which have NOT CHANGED    Details   albuterol (2.5 MG/3ML) 0.083% neb solution Take 1 vial by  nebulization every 4 hours as needed for shortness of breath / dyspnea or wheezing      nystatin (MYCOSTATIN) 751888 UNIT/GM POWD Apply three times daily to the groin for 5 days.  Qty: 15 g, Refills: 0    Associated Diagnoses: Candidiasis of skin      carBAMazepine (TEGRETOL) 100 MG/5ML suspension Take 150 mg by mouth every 6 hours      melatonin (MELATONIN) 1 MG/ML LIQD liquid 6 mg by Jejunal Tube route nightly as needed for sleep      Brivaracetam (BRIVIACT) 10 MG/ML solution Take 100 mg by mouth 2 times daily      pantoprazole (PROTONIX) SUSP suspension Take 20 mg by mouth daily       potassium & sodium phosphates (NEUTRA-PHOS) 280-160-250 MG Packet Take 1 packet by mouth 3 times daily 0900, 1500, 2100.       VITAMIN D, CHOLECALCIFEROL, PO Take 2,000 Units by mouth daily      bacitracin ointment Apply topically daily as needed for wound care To PEG site.      aspirin 10 mg/mL 8.1 mLs (81 mg) by Per J Tube route daily    Associated Diagnoses: Routine adult health maintenance      multivitamins with minerals (CERTAVITE/CEROVITE) LIQD liquid 15 mLs by Per J Tube route daily    Associated Diagnoses: Necrotizing pancreatitis; Malnutrition (H)      calcium carbonate 1250 (500 CA) MG/5ML SUSP suspension 5 mLs (1,250 mg) by Per J Tube route 3 times daily (with meals)  Qty: 450 mL    Associated Diagnoses: Malnutrition (H)      levothyroxine (SYNTHROID) 25 mcg/mL 6 mLs (150 mcg) by Per J Tube route every morning (before breakfast)    Associated Diagnoses: Panhypopituitarism (H)      !! hydrocortisone (CORTEF) 2 mg/mL 10 mLs (20 mg) by Per J Tube route every morning    Associated Diagnoses: Panhypopituitarism (H)      !! hydrocortisone (CORTEF) 2 mg/mL Take 5 mLs (10 mg) by mouth every evening    Associated Diagnoses: Panhypopituitarism (H)      bisacodyl (DULCOLAX) 10 MG Suppository Place 1 suppository (10 mg) rectally daily as needed for constipation  Qty: 30 suppository    Associated Diagnoses: Constipation, unspecified  constipation type      miconazole (MICATIN; MICRO GUARD) 2 % powder Apply topically every hour as needed for other (topical candidiasis)    Associated Diagnoses: Rash      ACETAMINOPHEN  mg by Per Feeding Tube route every 4 hours as needed for pain      testosterone cypionate (DEPOTESTOTERONE CYPIONATE) 200 MG/ML injection Inject 100 mg into the muscle See Admin Instructions Every 2-3 weeks.      fiber modular (NUTRISOURCE FIBER) packet 1 packet by Per Feeding Tube route 3 times daily    Associated Diagnoses: Necrotizing pancreatitis       !! - Potential duplicate medications found. Please discuss with provider.        Allergies   Allergies   Allergen Reactions     Dilantin [Phenytoin Sodium]      Valproic Acid      Toxicity c bone marrow suspension, elevated ammonia levels      Data   Most Recent 3 CBC's:  Recent Labs   Lab Test  04/02/18   0850  04/01/18   0855  03/31/18   2144   WBC  6.2  10.0  13.8*   HGB  11.9*  12.1*  13.5   MCV  89  89  89   PLT  118*  115*  137*      Most Recent 3 BMP's:  Recent Labs   Lab Test  04/02/18   0850  04/01/18   0855  03/31/18   2144   NA  136  135  132*   POTASSIUM  4.1  4.2  4.4   CHLORIDE  103  103  95   CO2  25  25  31   BUN  13  14  16   CR  0.96  0.97  0.95   ANIONGAP  8  7  6   STEVE  8.3*  8.3*  9.0   GLC  102*  75  92     Most Recent 2 LFT's:  Recent Labs   Lab Test  03/31/18   2144  02/14/18   1300   AST  19  18   ALT  26  25   ALKPHOS  109  66   BILITOTAL  0.3  0.6     Most Recent INR's and Anticoagulation Dosing History:  Anticoagulation Dose History     Recent Dosing and Labs Latest Ref Rng & Units 12/1/2016 1/22/2017 1/22/2017 1/23/2017 1/25/2017 1/30/2017 2/7/2017    INR 0.86 - 1.14 1.30(H) 2.48(H) 2.58(H) 1.53(H) 1.49(H) 1.32(H) 1.27(H)        Most Recent 3 Troponin's:  Recent Labs   Lab Test  01/22/17   0500  01/21/17   2348  01/21/17   1600   TROPI  0.017  0.025  0.028     Most Recent Cholesterol Panel:  Recent Labs   Lab Test  11/29/16   0430   TRIG  100      Most Recent 6 Bacteria Isolates From Any Culture (See EPIC Reports for Culture Details):  Recent Labs   Lab Test  03/31/18   2304  03/31/18   2241  03/31/18   2144  02/15/18   0020  02/14/18   0854  02/14/18   0450   CULT  <1000 colonies/mL  urogenital jaime  Susceptibility testing not routinely done    No growth after 3 days  No growth after 3 days  Light growth  Methicillin resistant Staphylococcus aureus (MRSA)  This isolate is presumed to be clindamycin resistant based on detection of inducible   clindamycin resistance. Erythromycin and clindamycin are resistant, therefore, they are   not recommended for use.  *  No growth  No growth     Most Recent TSH, T4 and A1c Labs:  Recent Labs   Lab Test  02/15/18   0610  01/29/17   0403  12/01/16   0400   TSH   --    --   <0.01*   T4   --   0.54*  0.73*   A1C  6.6*   --    --        Results for orders placed or performed during the hospital encounter of 03/31/18   XR Chest 2 Views    Narrative    CHEST TWO VIEWS    3/31/2018 10:41 PM     HISTORY: Fever.    COMPARISON: 2/14/2018 chest x-ray.      Impression    IMPRESSION: Low volume inspiration. There are tubes projecting in the  right upper quadrant. Heart size is normal. Pulmonary vasculature does  not appear distended. No focal infiltrates or definite pleural fluid.    SAMEER WRIGHT MD   XR Chest Port 1 View    Narrative    XR CHEST PORT 1 VW 4/2/2018 10:44 AM    HISTORY: Febrile episode.    COMPARISON: 3/31/2018, 1/6/2018, 1/30/2017    FINDINGS: No airspace consolidation, pleural effusion or pneumothorax.  Stable heart size.      Impression    IMPRESSION: No specific evidence of pneumonia.    SAMEER DELGADO MD

## 2018-04-04 NOTE — PLAN OF CARE
Problem: Patient Care Overview  Goal: Plan of Care/Patient Progress Review  Outcome: Improving  Pt alert. Aphasic, but follows basic directions/commands, baseline for pt. VSS on RA. Total cares. Turn and repo q2h. NPO, GJ tube patent, tube feeding continuous, stopped at 0530 to administer synthroid (see MAR). Coccyx has blanchable redness, new mepilex border applied, no open areas. +BS in all four quadrants, passing flatus. Incontinent of B/B. Scabbing on L shin and R forearm present, no new areas. LS diminished throughout.  Tele NSR. Abx given via GJ tube. Contact Iso maintained. Will continue to monitor.

## 2018-04-04 NOTE — PLAN OF CARE
Problem: Patient Care Overview  Goal: Plan of Care/Patient Progress Review  Outcome: Adequate for Discharge Date Met: 04/04/18  Afebrile; LS diminished with O2sats 97-98% RA; no SOB or wheezes.  Patient denies pain/N/V.  NPO with frequent oral cares.  TF is infusing at goal rate of 60cc/hr.  Patient is aphasic but responds appropriately by shaking/nodding head; follows simple directions.  Blanchable redness on coccyx and scabs on BUE/BLE noted; otherwise, skin is intact.  DC instructions and prescriptions were provided.  Patient's mom Savannah verbalized understanding of all information received.  Patient is DC'd home via [facility].

## 2018-04-04 NOTE — PROGRESS NOTES
Spoke with [facility] and they have received the discharge orders. They are ok with information faxed. His mother requested to make his appointment with his pmd.

## 2018-05-01 ENCOUNTER — HOSPITAL ENCOUNTER (INPATIENT)
Facility: CLINIC | Age: 58
LOS: 4 days | Discharge: HOME HEALTH | DRG: 871 | End: 2018-05-05
Attending: EMERGENCY MEDICINE | Admitting: INTERNAL MEDICINE
Payer: MEDICARE

## 2018-05-01 ENCOUNTER — APPOINTMENT (OUTPATIENT)
Dept: GENERAL RADIOLOGY | Facility: CLINIC | Age: 58
DRG: 871 | End: 2018-05-01
Attending: EMERGENCY MEDICINE
Payer: MEDICARE

## 2018-05-01 DIAGNOSIS — A41.9 SEPSIS (H): ICD-10-CM

## 2018-05-01 DIAGNOSIS — A41.9 SEVERE SEPSIS (H): ICD-10-CM

## 2018-05-01 DIAGNOSIS — Z76.89 HEALTH CARE HOME: ICD-10-CM

## 2018-05-01 DIAGNOSIS — J69.0 ASPIRATION PNEUMONIA, UNSPECIFIED ASPIRATION PNEUMONIA TYPE, UNSPECIFIED LATERALITY, UNSPECIFIED PART OF LUNG (H): Primary | ICD-10-CM

## 2018-05-01 DIAGNOSIS — Z00.00 ROUTINE ADULT HEALTH MAINTENANCE: ICD-10-CM

## 2018-05-01 DIAGNOSIS — R65.20 SEVERE SEPSIS (H): ICD-10-CM

## 2018-05-01 DIAGNOSIS — J18.9 PNEUMONIA OF LOWER LOBE DUE TO INFECTIOUS ORGANISM, UNSPECIFIED LATERALITY: ICD-10-CM

## 2018-05-01 LAB
ALBUMIN SERPL-MCNC: 3.7 G/DL (ref 3.4–5)
ALBUMIN UR-MCNC: 30 MG/DL
ALP SERPL-CCNC: 112 U/L (ref 40–150)
ALT SERPL W P-5'-P-CCNC: 30 U/L (ref 0–70)
AMORPH CRY #/AREA URNS HPF: ABNORMAL /HPF
ANION GAP SERPL CALCULATED.3IONS-SCNC: 9 MMOL/L (ref 3–14)
APPEARANCE UR: ABNORMAL
AST SERPL W P-5'-P-CCNC: 20 U/L (ref 0–45)
BASOPHILS # BLD AUTO: 0.1 10E9/L (ref 0–0.2)
BASOPHILS NFR BLD AUTO: 0.5 %
BILIRUB SERPL-MCNC: 0.2 MG/DL (ref 0.2–1.3)
BILIRUB UR QL STRIP: NEGATIVE
BUN SERPL-MCNC: 20 MG/DL (ref 7–30)
CALCIUM SERPL-MCNC: 9.1 MG/DL (ref 8.5–10.1)
CHLORIDE SERPL-SCNC: 97 MMOL/L (ref 94–109)
CO2 SERPL-SCNC: 30 MMOL/L (ref 20–32)
COLOR UR AUTO: YELLOW
CREAT SERPL-MCNC: 0.82 MG/DL (ref 0.66–1.25)
DIFFERENTIAL METHOD BLD: ABNORMAL
EOSINOPHIL # BLD AUTO: 0.8 10E9/L (ref 0–0.7)
EOSINOPHIL NFR BLD AUTO: 5.7 %
ERYTHROCYTE [DISTWIDTH] IN BLOOD BY AUTOMATED COUNT: 13.9 % (ref 10–15)
GFR SERPL CREATININE-BSD FRML MDRD: >90 ML/MIN/1.7M2
GLUCOSE SERPL-MCNC: 114 MG/DL (ref 70–99)
GLUCOSE UR STRIP-MCNC: 30 MG/DL
HCT VFR BLD AUTO: 41 % (ref 40–53)
HGB BLD-MCNC: 14.2 G/DL (ref 13.3–17.7)
HGB UR QL STRIP: NEGATIVE
IMM GRANULOCYTES # BLD: 0 10E9/L (ref 0–0.4)
IMM GRANULOCYTES NFR BLD: 0.2 %
INTERPRETATION ECG - MUSE: NORMAL
KETONES UR STRIP-MCNC: NEGATIVE MG/DL
LACTATE BLD-SCNC: 3.3 MMOL/L (ref 0.7–2)
LACTATE SERPL-SCNC: 2.3 MMOL/L (ref 0.4–2)
LEUKOCYTE ESTERASE UR QL STRIP: NEGATIVE
LYMPHOCYTES # BLD AUTO: 3 10E9/L (ref 0.8–5.3)
LYMPHOCYTES NFR BLD AUTO: 21.2 %
MCH RBC QN AUTO: 30.7 PG (ref 26.5–33)
MCHC RBC AUTO-ENTMCNC: 34.6 G/DL (ref 31.5–36.5)
MCV RBC AUTO: 89 FL (ref 78–100)
MONOCYTES # BLD AUTO: 1.1 10E9/L (ref 0–1.3)
MONOCYTES NFR BLD AUTO: 7.9 %
NEUTROPHILS # BLD AUTO: 9 10E9/L (ref 1.6–8.3)
NEUTROPHILS NFR BLD AUTO: 64.5 %
NITRATE UR QL: NEGATIVE
NRBC # BLD AUTO: 0 10*3/UL
NRBC BLD AUTO-RTO: 0 /100
PH UR STRIP: 7.5 PH (ref 5–7)
PLATELET # BLD AUTO: 123 10E9/L (ref 150–450)
POTASSIUM SERPL-SCNC: 3.9 MMOL/L (ref 3.4–5.3)
PROT SERPL-MCNC: 9 G/DL (ref 6.8–8.8)
RBC # BLD AUTO: 4.63 10E12/L (ref 4.4–5.9)
RBC #/AREA URNS AUTO: 0 /HPF (ref 0–2)
SODIUM SERPL-SCNC: 136 MMOL/L (ref 133–144)
SOURCE: ABNORMAL
SP GR UR STRIP: 1.02 (ref 1–1.03)
UROBILINOGEN UR STRIP-MCNC: 2 MG/DL (ref 0–2)
WBC # BLD AUTO: 14 10E9/L (ref 4–11)
WBC #/AREA URNS AUTO: 0 /HPF (ref 0–5)

## 2018-05-01 PROCEDURE — 93005 ELECTROCARDIOGRAM TRACING: CPT

## 2018-05-01 PROCEDURE — 71046 X-RAY EXAM CHEST 2 VIEWS: CPT

## 2018-05-01 PROCEDURE — 25000132 ZZH RX MED GY IP 250 OP 250 PS 637: Mod: GY | Performed by: EMERGENCY MEDICINE

## 2018-05-01 PROCEDURE — 99285 EMERGENCY DEPT VISIT HI MDM: CPT | Mod: 25

## 2018-05-01 PROCEDURE — A9270 NON-COVERED ITEM OR SERVICE: HCPCS | Mod: GY | Performed by: EMERGENCY MEDICINE

## 2018-05-01 PROCEDURE — 25000128 H RX IP 250 OP 636: Performed by: EMERGENCY MEDICINE

## 2018-05-01 PROCEDURE — 96365 THER/PROPH/DIAG IV INF INIT: CPT

## 2018-05-01 PROCEDURE — 36415 COLL VENOUS BLD VENIPUNCTURE: CPT

## 2018-05-01 PROCEDURE — 87040 BLOOD CULTURE FOR BACTERIA: CPT | Performed by: EMERGENCY MEDICINE

## 2018-05-01 PROCEDURE — 83605 ASSAY OF LACTIC ACID: CPT | Performed by: INTERNAL MEDICINE

## 2018-05-01 PROCEDURE — 96361 HYDRATE IV INFUSION ADD-ON: CPT

## 2018-05-01 PROCEDURE — 81001 URINALYSIS AUTO W/SCOPE: CPT | Performed by: EMERGENCY MEDICINE

## 2018-05-01 PROCEDURE — 99223 1ST HOSP IP/OBS HIGH 75: CPT | Mod: AI | Performed by: INTERNAL MEDICINE

## 2018-05-01 PROCEDURE — 96375 TX/PRO/DX INJ NEW DRUG ADDON: CPT

## 2018-05-01 PROCEDURE — 85025 COMPLETE CBC W/AUTO DIFF WBC: CPT | Performed by: EMERGENCY MEDICINE

## 2018-05-01 PROCEDURE — 87086 URINE CULTURE/COLONY COUNT: CPT | Performed by: EMERGENCY MEDICINE

## 2018-05-01 PROCEDURE — 83690 ASSAY OF LIPASE: CPT | Performed by: INTERNAL MEDICINE

## 2018-05-01 PROCEDURE — 80053 COMPREHEN METABOLIC PANEL: CPT | Performed by: EMERGENCY MEDICINE

## 2018-05-01 PROCEDURE — 12000000 ZZH R&B MED SURG/OB

## 2018-05-01 PROCEDURE — 83605 ASSAY OF LACTIC ACID: CPT | Performed by: EMERGENCY MEDICINE

## 2018-05-01 RX ORDER — LEVOTHYROXINE SODIUM 150 UG/1
150 TABLET ORAL
Status: DISCONTINUED | OUTPATIENT
Start: 2018-05-02 | End: 2018-05-05 | Stop reason: HOSPADM

## 2018-05-01 RX ORDER — CALCIUM CARBONATE 1250 MG/5ML
1250 SUSPENSION ORAL 3 TIMES DAILY
Status: DISCONTINUED | OUTPATIENT
Start: 2018-05-02 | End: 2018-05-05 | Stop reason: HOSPADM

## 2018-05-01 RX ORDER — POTASSIUM CHLORIDE 7.45 MG/ML
10 INJECTION INTRAVENOUS
Status: DISCONTINUED | OUTPATIENT
Start: 2018-05-01 | End: 2018-05-05 | Stop reason: HOSPADM

## 2018-05-01 RX ORDER — ALBUTEROL SULFATE 0.83 MG/ML
1 SOLUTION RESPIRATORY (INHALATION) EVERY 4 HOURS PRN
Status: DISCONTINUED | OUTPATIENT
Start: 2018-05-01 | End: 2018-05-05 | Stop reason: HOSPADM

## 2018-05-01 RX ORDER — BACITRACIN ZINC 500 [USP'U]/G
OINTMENT TOPICAL DAILY PRN
Status: DISCONTINUED | OUTPATIENT
Start: 2018-05-01 | End: 2018-05-05 | Stop reason: HOSPADM

## 2018-05-01 RX ORDER — BISACODYL 10 MG
10 SUPPOSITORY, RECTAL RECTAL DAILY PRN
Status: DISCONTINUED | OUTPATIENT
Start: 2018-05-01 | End: 2018-05-01

## 2018-05-01 RX ORDER — POTASSIUM CHLORIDE 1500 MG/1
20-40 TABLET, EXTENDED RELEASE ORAL
Status: DISCONTINUED | OUTPATIENT
Start: 2018-05-01 | End: 2018-05-05 | Stop reason: HOSPADM

## 2018-05-01 RX ORDER — ONDANSETRON 4 MG/1
4 TABLET, ORALLY DISINTEGRATING ORAL EVERY 6 HOURS PRN
Status: DISCONTINUED | OUTPATIENT
Start: 2018-05-01 | End: 2018-05-05 | Stop reason: HOSPADM

## 2018-05-01 RX ORDER — POTASSIUM CHLORIDE 1.5 G/1.58G
20-40 POWDER, FOR SOLUTION ORAL
Status: DISCONTINUED | OUTPATIENT
Start: 2018-05-01 | End: 2018-05-05 | Stop reason: HOSPADM

## 2018-05-01 RX ORDER — ONDANSETRON 2 MG/ML
4 INJECTION INTRAMUSCULAR; INTRAVENOUS EVERY 6 HOURS PRN
Status: DISCONTINUED | OUTPATIENT
Start: 2018-05-01 | End: 2018-05-05 | Stop reason: HOSPADM

## 2018-05-01 RX ORDER — POLYETHYLENE GLYCOL 3350 17 G/17G
17 POWDER, FOR SOLUTION ORAL DAILY PRN
Status: DISCONTINUED | OUTPATIENT
Start: 2018-05-01 | End: 2018-05-05 | Stop reason: HOSPADM

## 2018-05-01 RX ORDER — BISACODYL 10 MG
10 SUPPOSITORY, RECTAL RECTAL DAILY PRN
Status: DISCONTINUED | OUTPATIENT
Start: 2018-05-01 | End: 2018-05-05 | Stop reason: HOSPADM

## 2018-05-01 RX ORDER — GUAR GUM
1 PACKET (EA) ORAL 3 TIMES DAILY
Status: DISCONTINUED | OUTPATIENT
Start: 2018-05-02 | End: 2018-05-05 | Stop reason: HOSPADM

## 2018-05-01 RX ORDER — PIPERACILLIN SODIUM, TAZOBACTAM SODIUM 4; .5 G/20ML; G/20ML
4.5 INJECTION, POWDER, LYOPHILIZED, FOR SOLUTION INTRAVENOUS ONCE
Status: COMPLETED | OUTPATIENT
Start: 2018-05-01 | End: 2018-05-01

## 2018-05-01 RX ORDER — SODIUM CHLORIDE, SODIUM LACTATE, POTASSIUM CHLORIDE, CALCIUM CHLORIDE 600; 310; 30; 20 MG/100ML; MG/100ML; MG/100ML; MG/100ML
INJECTION, SOLUTION INTRAVENOUS CONTINUOUS
Status: DISCONTINUED | OUTPATIENT
Start: 2018-05-01 | End: 2018-05-01

## 2018-05-01 RX ORDER — HYDROCORTISONE 10 MG/1
10 TABLET ORAL ONCE
Status: COMPLETED | OUTPATIENT
Start: 2018-05-01 | End: 2018-05-01

## 2018-05-01 RX ORDER — PIPERACILLIN SODIUM, TAZOBACTAM SODIUM 4; .5 G/20ML; G/20ML
4.5 INJECTION, POWDER, LYOPHILIZED, FOR SOLUTION INTRAVENOUS EVERY 6 HOURS
Status: DISCONTINUED | OUTPATIENT
Start: 2018-05-02 | End: 2018-05-05 | Stop reason: HOSPADM

## 2018-05-01 RX ORDER — LANOLIN ALCOHOL/MO/W.PET/CERES
6 CREAM (GRAM) TOPICAL
Status: DISCONTINUED | OUTPATIENT
Start: 2018-05-01 | End: 2018-05-05 | Stop reason: HOSPADM

## 2018-05-01 RX ORDER — PROCHLORPERAZINE MALEATE 5 MG
10 TABLET ORAL EVERY 6 HOURS PRN
Status: DISCONTINUED | OUTPATIENT
Start: 2018-05-01 | End: 2018-05-05 | Stop reason: HOSPADM

## 2018-05-01 RX ORDER — CARBAMAZEPINE 100 MG/5ML
150 SUSPENSION ORAL EVERY 6 HOURS
Status: DISCONTINUED | OUTPATIENT
Start: 2018-05-01 | End: 2018-05-05 | Stop reason: HOSPADM

## 2018-05-01 RX ORDER — TRIAMCINOLONE ACETONIDE 5 MG/G
CREAM TOPICAL 2 TIMES DAILY
Status: DISCONTINUED | OUTPATIENT
Start: 2018-05-02 | End: 2018-05-02

## 2018-05-01 RX ORDER — POTASSIUM CHLORIDE 29.8 MG/ML
20 INJECTION INTRAVENOUS
Status: DISCONTINUED | OUTPATIENT
Start: 2018-05-01 | End: 2018-05-05 | Stop reason: HOSPADM

## 2018-05-01 RX ORDER — PROCHLORPERAZINE 25 MG
25 SUPPOSITORY, RECTAL RECTAL EVERY 12 HOURS PRN
Status: DISCONTINUED | OUTPATIENT
Start: 2018-05-01 | End: 2018-05-05 | Stop reason: HOSPADM

## 2018-05-01 RX ORDER — SODIUM CHLORIDE 9 MG/ML
INJECTION, SOLUTION INTRAVENOUS CONTINUOUS
Status: DISCONTINUED | OUTPATIENT
Start: 2018-05-01 | End: 2018-05-02

## 2018-05-01 RX ORDER — NALOXONE HYDROCHLORIDE 0.4 MG/ML
.1-.4 INJECTION, SOLUTION INTRAMUSCULAR; INTRAVENOUS; SUBCUTANEOUS
Status: DISCONTINUED | OUTPATIENT
Start: 2018-05-01 | End: 2018-05-05 | Stop reason: HOSPADM

## 2018-05-01 RX ORDER — ACETAMINOPHEN 325 MG/1
650 TABLET ORAL EVERY 4 HOURS PRN
Status: DISCONTINUED | OUTPATIENT
Start: 2018-05-01 | End: 2018-05-02

## 2018-05-01 RX ORDER — ASPIRIN 81 MG/1
81 TABLET, CHEWABLE ORAL DAILY
Status: DISCONTINUED | OUTPATIENT
Start: 2018-05-02 | End: 2018-05-05 | Stop reason: HOSPADM

## 2018-05-01 RX ORDER — POTASSIUM CL/LIDO/0.9 % NACL 10MEQ/0.1L
10 INTRAVENOUS SOLUTION, PIGGYBACK (ML) INTRAVENOUS
Status: DISCONTINUED | OUTPATIENT
Start: 2018-05-01 | End: 2018-05-05 | Stop reason: HOSPADM

## 2018-05-01 RX ORDER — CARBAMAZEPINE 100 MG/5ML
150 SUSPENSION ORAL ONCE
Status: COMPLETED | OUTPATIENT
Start: 2018-05-02 | End: 2018-05-02

## 2018-05-01 RX ADMIN — HYDROCORTISONE SODIUM SUCCINATE 50 MG: 100 INJECTION, POWDER, FOR SOLUTION INTRAMUSCULAR; INTRAVENOUS at 23:22

## 2018-05-01 RX ADMIN — SODIUM CHLORIDE, POTASSIUM CHLORIDE, SODIUM LACTATE AND CALCIUM CHLORIDE 2244 ML: 600; 310; 30; 20 INJECTION, SOLUTION INTRAVENOUS at 21:07

## 2018-05-01 RX ADMIN — SODIUM CHLORIDE 1000 ML: 9 INJECTION, SOLUTION INTRAVENOUS at 20:21

## 2018-05-01 RX ADMIN — SODIUM CHLORIDE 1250 ML: 9 INJECTION, SOLUTION INTRAVENOUS at 21:22

## 2018-05-01 RX ADMIN — HYDROCORTISONE 10 MG: 10 TABLET ORAL at 22:24

## 2018-05-01 RX ADMIN — PIPERACILLIN SODIUM,TAZOBACTAM SODIUM 4.5 G: 4; .5 INJECTION, POWDER, FOR SOLUTION INTRAVENOUS at 21:07

## 2018-05-01 RX ADMIN — VANCOMYCIN HYDROCHLORIDE 1750 MG: 5 INJECTION, POWDER, LYOPHILIZED, FOR SOLUTION INTRAVENOUS at 21:46

## 2018-05-01 RX ADMIN — BRIVARACETAM 100 MG: 10 SOLUTION ORAL at 22:24

## 2018-05-01 ASSESSMENT — ENCOUNTER SYMPTOMS
VOMITING: 1
COUGH: 1
CHILLS: 1
FEVER: 1

## 2018-05-01 NOTE — IP AVS SNAPSHOT
[facility] [address] [facility]    [address] [place] [address]    Phone:  [phone]                                       After Visit Summary   5/1/2018    Keyon Farias    MRN: [id number]           After Visit Summary Signature Page     I have received my discharge instructions, and my questions have been answered. I have discussed any challenges I see with this plan with the nurse or doctor.    ..........................................................................................................................................  Patient/Patient Representative Signature      ..........................................................................................................................................  Patient Representative Print Name and Relationship to Patient    ..................................................               ................................................  Date                                            Time    ..........................................................................................................................................  Reviewed by Signature/Title    ...................................................              ..............................................  Date                                                            Time

## 2018-05-01 NOTE — IP AVS SNAPSHOT
MRN:[id number]                      After Visit Summary   5/1/2018    Keyon Farias    MRN: [id number]           Thank you!     Thank you for choosing [facility] for your care. Our goal is always to provide you with excellent care. Hearing back from our patients is one way we can continue to improve our services. Please take a few minutes to complete the written survey that you may receive in the mail after you visit with us. Thank you!        Patient Information     Date Of Birth          1960        Designated Caregiver       Most Recent Value    Caregiver    Will someone help with your care after discharge? yes    Name of designated caregiver niharika    Phone number of caregiver [phone]    Caregiver address same at pt      About your hospital stay     You were admitted on:  May 1, 2018 You last received care in the:  [facility] [address] [facility]    You were discharged on:  May 5, 2018        Reason for your hospital stay       [facility] Pneumonia                  Who to Call     For medical emergencies, please call 911.  For non-urgent questions about your medical care, please call your primary care provider or clinic, [phone]          Attending Provider     Provider Specialty    Rome Trejo MD Emergency Medicine    Leticia Santiago MD Internal Medicine       Primary Care Provider Office Phone # Fax #    Carlos Gomez [phone]       When to contact your care team       Call your primary doctor or return to ER if you have any of the following: temperature greater than 100.5 or less than 96, chills,  increased shortness of breath, chest pain, lethargy, loss of consciousness, severe diarrhea.                  After Care Instructions     Diet       Follow this diet upon discharge: Orders Placed This Encounter      Adult Formula Drip Feeding: Continuous Isosource 1.5; Jejunostomy; Goal Rate: 60; mL/hr; Medication - Tube Feeding Flush  Frequency: At least 15-30 mL water before and after medication administration and with tube clogging; Amout to Send (Nutrition...      NPO for Medical/Clinical Reasons Except for: No Exceptions            Wound care and dressings       Instructions to care for your wound at home:    - Plan for skin/wound care to coccyx:  BID and prn:  - cleanse with Rita perineal lotion and soft wipes  - apply cortisone cream to reddened area per MD orders  - leave open to air, no dressings at this time  - reposition pt every 1-2 hrs, side to side only  - HOB as low as tolerated                  Follow-up Appointments     Follow-up and recommended labs and tests        Appointment with Dr Gomez   5/9 at 12 noon at the [facility]            Follow-up and recommended labs and tests        Follow up with primary care provider, Carlos Gomez, within 7 days for hospital follow- up.  No follow up labs or test are needed.    Follow up with Video swallow eval as scheduled.                  Additional Services     Home care nursing referral       Resume [facility] services    Your provider has ordered home care nursing services. If you have not been contacted within 2 days of your discharge please call the inpatient department phone number at [phone] .                  Pending Results     Date and Time Order Name Status Description    5/3/2018 1045 EKG 12-lead, tracing only Preliminary     5/1/2018 2011 Blood culture Preliminary     5/1/2018 2011 Blood culture Preliminary             Statement of Approval     Ordered          05/05/18 1104  I have reviewed and agree with all the recommendations and orders detailed in this document.  EFFECTIVE NOW     Approved and electronically signed by:  Cele Maldonado MD             Admission Information     Date & Time Provider Department Dept. Phone    5/1/2018 Leticia Santiago MD [facility] [address] [facility] [phone]      Your Vitals Were     Blood Pressure  "Temperature Respirations Height Weight Pulse Oximetry    112/69 (BP Location: Right arm) 98  F (36.7  C) (Axillary) 20 1.829 m (6') 77.4 kg (170 lb 10.2 oz) 97%    BMI (Body Mass Index)                   23.14 kg/m2           [email or web address] Information     [email or web address] lets you send messages to your doctor, view your test results, renew your prescriptions, schedule appointments and more. To sign up, go to [email or web address] . Click on \"Log in\" on the left side of the screen, which will take you to the Welcome page. Then click on \"Sign up Now\" on the right side of the page.     You will be asked to enter the access code listed below, as well as some personal information. Please follow the directions to create your username and password.     Your access code is: [email or web address]  Expires: 2018  2:31 PM     Your access code will  in 90 days. If you need help or a new code, please call your [facility] clinic or [phone].        Care EveryWhere ID     This is your Care EveryWhere ID. This could be used by other organizations to access your [facility] medical records  [id number]        Equal Access to Services     BRENDA LAO AH: Angely burnetto Sonikunj, wazachda luqadaha, qaybta kaalmada adeegyada, yaw contreras. So [facility] [phone].    ATENCIÓN: Si habla español, tiene a king disposición servicios gratuitos de asistencia lingüística. Llame al [phone].    We comply with applicable federal civil rights laws and [place] laws. We do not discriminate on the basis of race, color, national origin, age, disability, sex, sexual orientation, or gender identity.               Review of your medicines      START taking        Dose / Directions    amoxicillin-clavulanate 875-125 MG per tablet   Commonly known as:  AUGMENTIN        Dose:  1 tablet   1 tablet by Per J Tube route 2 times daily   Quantity:  12 tablet   Refills:  0         CONTINUE these medicines which may have CHANGED, or have new " prescriptions. If we are uncertain of the size of tablets/capsules you have at home, strength may be listed as something that might have changed.        Dose / Directions    levothyroxine 25 mcg/mL Susp   Commonly known as:  SYNTHROID   This may have changed:  additional instructions   Used for:  Panhypopituitarism (H)        Dose:  150 mcg   6 mLs (150 mcg) by Per J Tube route every morning (before breakfast)   Refills:  0         CONTINUE these medicines which have NOT CHANGED        Dose / Directions    ACETAMINOPHEN PO        Dose:  650 mg   650 mg by Per Feeding Tube route every 4 hours as needed for pain   Refills:  0       albuterol (2.5 MG/3ML) 0.083% neb solution        Dose:  1 vial   Take 1 vial by nebulization every 4 hours as needed for shortness of breath / dyspnea or wheezing   Refills:  0       aspirin 10 mg/mL Susp   Used for:  Routine adult health maintenance        Dose:  81 mg   8.1 mLs (81 mg) by Per J Tube route daily   Refills:  0       bacitracin ointment        Apply topically daily as needed for wound care To PEG site.   Refills:  0       bisacodyl 10 MG Suppository   Commonly known as:  DULCOLAX   Used for:  Constipation, unspecified constipation type        Dose:  10 mg   Place 1 suppository (10 mg) rectally daily as needed for constipation   Quantity:  30 suppository   Refills:  0       BRIVIACT 10 MG/ML solution   Generic drug:  Brivaracetam        Dose:  100 mg   Take 100 mg by mouth 2 times daily   Refills:  0       calcium carbonate 1250 (500 Ca) MG/5ML Susp suspension   Used for:  Malnutrition (H)        Dose:  1250 mg   5 mLs (1,250 mg) by Per J Tube route 3 times daily (with meals)   Quantity:  450 mL   Refills:  0       carBAMazepine 100 MG/5ML suspension   Commonly known as:  TEGretol        Dose:  150 mg   Take 150 mg by mouth every 6 hours   Refills:  0       fiber modular packet   Used for:  Necrotizing pancreatitis        Dose:  1 packet   1 packet by Per Feeding Tube route  3 times daily   Refills:  0       * hydrocortisone 2 mg/mL Susp   Commonly known as:  CORTEF   Used for:  Panhypopituitarism (H)        Dose:  20 mg   10 mLs (20 mg) by Per J Tube route every morning   Refills:  0       * hydrocortisone 2 mg/mL Susp   Commonly known as:  CORTEF   Used for:  Panhypopituitarism (H)        Dose:  10 mg   Take 5 mLs (10 mg) by mouth every evening   Refills:  0       melatonin 1 MG/ML Liqd liquid        Dose:  6 mg   6 mg by Jejunal Tube route nightly as needed for sleep   Refills:  0       multivitamins with minerals Liqd liquid   Used for:  Necrotizing pancreatitis, Malnutrition (H)        Dose:  15 mL   15 mLs by Per J Tube route daily   Refills:  0       * pantoprazole Susp suspension   Commonly known as:  PROTONIX        Dose:  10 mg   Take 10 mg by mouth daily   Refills:  0       * pantoprazole Susp suspension   Commonly known as:  PROTONIX        Dose:  10 mg   Take 10 mg by mouth every evening   Refills:  0       potassium & sodium phosphates 280-160-250 MG Packet   Commonly known as:  NEUTRA-PHOS        Dose:  1 packet   Take 1 packet by mouth 3 times daily 0900, 1500, 2100.   Refills:  0       testosterone cypionate 200 MG/ML injection   Commonly known as:  DEPOTESTOTERONE        Dose:  100 mg   Inject 100 mg into the muscle See Admin Instructions Every 2 weeks.   Refills:  0       VITAMIN D (CHOLECALCIFEROL) PO        Dose:  2000 Units   Take 2,000 Units by mouth daily   Refills:  0       * Notice:  This list has 4 medication(s) that are the same as other medications prescribed for you. Read the directions carefully, and ask your doctor or other care provider to review them with you.         Where to get your medicines      These medications were sent to [facility] - [address]  [address], [place] [address]     Phone:  [phone]     amoxicillin-clavulanate 875-125 MG per tablet                Protect others around you: Learn  how to safely use, store and throw away your medicines at www.disposemymeds.org.        ANTIBIOTIC INSTRUCTION     You've Been Prescribed an Antibiotic - Now What?  Your healthcare team thinks that you or your loved one might have an infection. Some infections can be treated with antibiotics, which are powerful, life-saving drugs. Like all medications, antibiotics have side effects and should only be used when necessary. There are some important things you should know about your antibiotic treatment.      Your healthcare team may run tests before you start taking an antibiotic.    Your team may take samples (e.g., from your blood, urine or other areas) to run tests to look for bacteria. These test can be important to determine if you need an antibiotic at all and, if you do, which antibiotic will work best.      Within a few days, your healthcare team might change or even stop your antibiotic.    Your team may start you on an antibiotic while they are working to find out what is making you sick.    Your team might change your antibiotic because test results show that a different antibiotic would be better to treat your infection.    In some cases, once your team has more information, they learn that you do not need an antibiotic at all. They may find out that you don't have an infection, or that the antibiotic you're taking won't work against your infection. For example, an infection caused by a virus can't be treated with antibiotics. Staying on an antibiotic when you don't need it is more likely to be harmful than helpful.      You may experience side effects from your antibiotic.    Like all medications, antibiotics have side effects. Some of these can be serious.    Let you healthcare team know if you have any known allergies when you are admitted to the hospital.    One significant side effect of nearly all antibiotics is the risk of severe and sometimes deadly diarrhea caused by Clostridium difficile (C.  Difficile). This occurs when a person takes antibiotics because some good germs are destroyed. Antibiotic use allows C. diificile to take over, putting patients at high risk for this serious infection.    As a patient or caregiver, it is important to understand your or your loved one's antibiotic treatment. It is especially important for caregivers to speak up when patients can't speak for themselves. Here are some important questions to ask your healthcare team.    What infection is this antibiotic treating and how do you know I have that infection?    What side effects might occur from this antibiotic?    How long will I need to take this antibiotic?    Is it safe to take this antibiotic with other medications or supplements (e.g., vitamins) that I am taking?     Are there any special directions I need to know about taking this antibiotic? For example, should I take it with food?    How will I be monitored to know whether my infection is responding to the antibiotic?    What tests may help to make sure the right antibiotic is prescribed for me?      Information provided by:  www.cdc.gov/getsmart  U.S. Department of Health and Human Services  Centers for disease Control and Prevention  National Center for Emerging and Zoonotic Infectious Diseases  Division of Healthcare Quality Promotion             Medication List: This is a list of all your medications and when to take them. Check marks below indicate your daily home schedule. Keep this list as a reference.      Medications           Morning Afternoon Evening Bedtime As Needed    ACETAMINOPHEN PO   650 mg by Per Feeding Tube route every 4 hours as needed for pain   Last time this was given:  650 mg on 5/2/2018 12:56 AM                                   albuterol (2.5 MG/3ML) 0.083% neb solution   Take 1 vial by nebulization every 4 hours as needed for shortness of breath / dyspnea or wheezing                                   amoxicillin-clavulanate 875-125 MG  per tablet   Commonly known as:  AUGMENTIN   1 tablet by Per J Tube route 2 times daily   Next Dose Due:  Tomorrow morning                                      aspirin 10 mg/mL Susp   8.1 mLs (81 mg) by Per J Tube route daily   Next Dose Due:  Tomorrow morning                                   bacitracin ointment   Apply topically daily as needed for wound care To PEG site.                                   bisacodyl 10 MG Suppository   Commonly known as:  DULCOLAX   Place 1 suppository (10 mg) rectally daily as needed for constipation                                   BRIVIACT 10 MG/ML solution   Take 100 mg by mouth 2 times daily   Last time this was given:  100 mg on 5/5/2018  9:49 AM   Generic drug:  Brivaracetam   Next Dose Due:  Tonight at bedtime                                      calcium carbonate 1250 (500 Ca) MG/5ML Susp suspension   5 mLs (1,250 mg) by Per J Tube route 3 times daily (with meals)   Next Dose Due:  Resume at home                                         carBAMazepine 100 MG/5ML suspension   Commonly known as:  TEGretol   Take 150 mg by mouth every 6 hours   Last time this was given:  150 mg on 5/5/2018 11:42 AM   Last time this was given:  At bedtime today                                         fiber modular packet   1 packet by Per Feeding Tube route 3 times daily   Last time this was given:  1 packet on 5/3/2018  9:12 AM   Next Dose Due:  Restart at home                                         * hydrocortisone 2 mg/mL Susp   Commonly known as:  CORTEF   10 mLs (20 mg) by Per J Tube route every morning   Next Dose Due:  Restart at home                                   * hydrocortisone 2 mg/mL Susp   Commonly known as:  CORTEF   Take 5 mLs (10 mg) by mouth every evening   Next Dose Due:  Restart at home                                     levothyroxine 25 mcg/mL Susp   Commonly known as:  SYNTHROID   6 mLs (150 mcg) by Per J Tube route every morning (before breakfast)   Next Dose Due:   Tomorrow morning                                   melatonin 1 MG/ML Liqd liquid   6 mg by Jejunal Tube route nightly as needed for sleep                                   multivitamins with minerals Liqd liquid   15 mLs by Per J Tube route daily   Next Dose Due:  Restart at home                                   * pantoprazole Susp suspension   Commonly known as:  PROTONIX   Take 10 mg by mouth daily   Last time this was given:  10 mg on 5/5/2018  9:36 AM   Next Dose Due:  Tomorrow morning                                   * pantoprazole Susp suspension   Commonly known as:  PROTONIX   Take 10 mg by mouth every evening   Last time this was given:  10 mg on 5/5/2018  9:36 AM   Next Dose Due:  Today in the  evening                                   potassium & sodium phosphates 280-160-250 MG Packet   Commonly known as:  NEUTRA-PHOS   Take 1 packet by mouth 3 times daily 0900, 1500, 2100.   Last time this was given:  1 packet on 5/5/2018  9:36 AM   Next Dose Due:  Today in the evening                                         testosterone cypionate 200 MG/ML injection   Commonly known as:  DEPOTESTOTERONE   Inject 100 mg into the muscle See Admin Instructions Every 2 weeks.   Next Dose Due:  Restart at home                                VITAMIN D (CHOLECALCIFEROL) PO   Take 2,000 Units by mouth daily   Next Dose Due:  Restart at home                                   * Notice:  This list has 4 medication(s) that are the same as other medications prescribed for you. Read the directions carefully, and ask your doctor or other care provider to review them with you.

## 2018-05-02 LAB
ALBUMIN UR-MCNC: 10 MG/DL
AMORPH CRY #/AREA URNS HPF: ABNORMAL /HPF
ANION GAP SERPL CALCULATED.3IONS-SCNC: 6 MMOL/L (ref 3–14)
APPEARANCE UR: ABNORMAL
BACTERIA SPEC CULT: NO GROWTH
BILIRUB UR QL STRIP: NEGATIVE
BUN SERPL-MCNC: 15 MG/DL (ref 7–30)
CALCIUM SERPL-MCNC: 8.6 MG/DL (ref 8.5–10.1)
CHLORIDE SERPL-SCNC: 110 MMOL/L (ref 94–109)
CO2 SERPL-SCNC: 25 MMOL/L (ref 20–32)
COLOR UR AUTO: ABNORMAL
CREAT SERPL-MCNC: 0.76 MG/DL (ref 0.66–1.25)
ERYTHROCYTE [DISTWIDTH] IN BLOOD BY AUTOMATED COUNT: 14.2 % (ref 10–15)
GFR SERPL CREATININE-BSD FRML MDRD: >90 ML/MIN/1.7M2
GLUCOSE SERPL-MCNC: 144 MG/DL (ref 70–99)
GLUCOSE UR STRIP-MCNC: NEGATIVE MG/DL
HCT VFR BLD AUTO: 36.5 % (ref 40–53)
HGB BLD-MCNC: 12.2 G/DL (ref 13.3–17.7)
HGB UR QL STRIP: NEGATIVE
KETONES UR STRIP-MCNC: NEGATIVE MG/DL
LACTATE BLD-SCNC: 2.2 MMOL/L (ref 0.7–2)
LACTATE BLD-SCNC: 2.6 MMOL/L (ref 0.7–2)
LACTATE BLD-SCNC: 2.8 MMOL/L (ref 0.7–2)
LACTATE BLD-SCNC: 3.2 MMOL/L (ref 0.7–2)
LACTATE BLD-SCNC: 3.5 MMOL/L (ref 0.7–2)
LACTATE SERPL-SCNC: 3.3 MMOL/L (ref 0.4–2)
LEUKOCYTE ESTERASE UR QL STRIP: NEGATIVE
LIPASE SERPL-CCNC: 283 U/L (ref 73–393)
Lab: NORMAL
MCH RBC QN AUTO: 29.9 PG (ref 26.5–33)
MCHC RBC AUTO-ENTMCNC: 33.4 G/DL (ref 31.5–36.5)
MCV RBC AUTO: 90 FL (ref 78–100)
NITRATE UR QL: NEGATIVE
PH UR STRIP: 7.5 PH (ref 5–7)
PLATELET # BLD AUTO: 119 10E9/L (ref 150–450)
POTASSIUM SERPL-SCNC: 4.3 MMOL/L (ref 3.4–5.3)
RBC # BLD AUTO: 4.08 10E12/L (ref 4.4–5.9)
RBC #/AREA URNS AUTO: 0 /HPF (ref 0–2)
SODIUM SERPL-SCNC: 141 MMOL/L (ref 133–144)
SOURCE: ABNORMAL
SP GR UR STRIP: 1 (ref 1–1.03)
SPECIMEN SOURCE: NORMAL
UROBILINOGEN UR STRIP-MCNC: NORMAL MG/DL (ref 0–2)
WBC # BLD AUTO: 15.6 10E9/L (ref 4–11)
WBC #/AREA URNS AUTO: 0 /HPF (ref 0–5)

## 2018-05-02 PROCEDURE — 25000132 ZZH RX MED GY IP 250 OP 250 PS 637: Mod: GY | Performed by: INTERNAL MEDICINE

## 2018-05-02 PROCEDURE — 36415 COLL VENOUS BLD VENIPUNCTURE: CPT | Performed by: INTERNAL MEDICINE

## 2018-05-02 PROCEDURE — A9270 NON-COVERED ITEM OR SERVICE: HCPCS | Mod: GY | Performed by: INTERNAL MEDICINE

## 2018-05-02 PROCEDURE — 36415 COLL VENOUS BLD VENIPUNCTURE: CPT | Performed by: NURSE PRACTITIONER

## 2018-05-02 PROCEDURE — 85027 COMPLETE CBC AUTOMATED: CPT | Performed by: INTERNAL MEDICINE

## 2018-05-02 PROCEDURE — 27210429 ZZH NUTRITION PRODUCT INTERMEDIATE LITER

## 2018-05-02 PROCEDURE — 99232 SBSQ HOSP IP/OBS MODERATE 35: CPT | Performed by: INTERNAL MEDICINE

## 2018-05-02 PROCEDURE — 25000128 H RX IP 250 OP 636

## 2018-05-02 PROCEDURE — 27210995 ZZH RX 272: Performed by: INTERNAL MEDICINE

## 2018-05-02 PROCEDURE — 83605 ASSAY OF LACTIC ACID: CPT | Performed by: NURSE PRACTITIONER

## 2018-05-02 PROCEDURE — A9270 NON-COVERED ITEM OR SERVICE: HCPCS | Mod: GY | Performed by: EMERGENCY MEDICINE

## 2018-05-02 PROCEDURE — 81001 URINALYSIS AUTO W/SCOPE: CPT | Performed by: NURSE PRACTITIONER

## 2018-05-02 PROCEDURE — 25000128 H RX IP 250 OP 636: Performed by: NURSE PRACTITIONER

## 2018-05-02 PROCEDURE — 25000132 ZZH RX MED GY IP 250 OP 250 PS 637: Mod: GY | Performed by: EMERGENCY MEDICINE

## 2018-05-02 PROCEDURE — A9270 NON-COVERED ITEM OR SERVICE: HCPCS | Mod: GY | Performed by: NURSE PRACTITIONER

## 2018-05-02 PROCEDURE — G0463 HOSPITAL OUTPT CLINIC VISIT: HCPCS

## 2018-05-02 PROCEDURE — 80048 BASIC METABOLIC PNL TOTAL CA: CPT | Performed by: INTERNAL MEDICINE

## 2018-05-02 PROCEDURE — 12000000 ZZH R&B MED SURG/OB

## 2018-05-02 PROCEDURE — 25000132 ZZH RX MED GY IP 250 OP 250 PS 637: Mod: GY | Performed by: NURSE PRACTITIONER

## 2018-05-02 PROCEDURE — 25000128 H RX IP 250 OP 636: Performed by: INTERNAL MEDICINE

## 2018-05-02 RX ORDER — HYDROCORTISONE 10 MG/1
10 TABLET ORAL EVERY EVENING
Status: DISCONTINUED | OUTPATIENT
Start: 2018-05-02 | End: 2018-05-05 | Stop reason: HOSPADM

## 2018-05-02 RX ORDER — HYDROCORTISONE 10 MG/1
20 TABLET ORAL EVERY MORNING
Status: DISCONTINUED | OUTPATIENT
Start: 2018-05-03 | End: 2018-05-05 | Stop reason: HOSPADM

## 2018-05-02 RX ORDER — NYSTATIN AND TRIAMCINOLONE ACETONIDE 100000; 1 [USP'U]/G; MG/G
CREAM TOPICAL 2 TIMES DAILY
Status: DISCONTINUED | OUTPATIENT
Start: 2018-05-02 | End: 2018-05-05 | Stop reason: HOSPADM

## 2018-05-02 RX ORDER — BENZOCAINE/MENTHOL 6 MG-10 MG
LOZENGE MUCOUS MEMBRANE 2 TIMES DAILY
Status: DISCONTINUED | OUTPATIENT
Start: 2018-05-02 | End: 2018-05-05 | Stop reason: HOSPADM

## 2018-05-02 RX ORDER — SODIUM CHLORIDE, SODIUM LACTATE, POTASSIUM CHLORIDE, CALCIUM CHLORIDE 600; 310; 30; 20 MG/100ML; MG/100ML; MG/100ML; MG/100ML
INJECTION, SOLUTION INTRAVENOUS CONTINUOUS
Status: DISCONTINUED | OUTPATIENT
Start: 2018-05-02 | End: 2018-05-04

## 2018-05-02 RX ORDER — ACETAMINOPHEN 325 MG/1
650 TABLET ORAL EVERY 4 HOURS PRN
Status: DISCONTINUED | OUTPATIENT
Start: 2018-05-02 | End: 2018-05-05 | Stop reason: HOSPADM

## 2018-05-02 RX ADMIN — POTASSIUM & SODIUM PHOSPHATES POWDER PACK 280-160-250 MG 1 PACKET: 280-160-250 PACK at 22:52

## 2018-05-02 RX ADMIN — HYDROCORTISONE 10 MG: 10 TABLET ORAL at 22:52

## 2018-05-02 RX ADMIN — ENOXAPARIN SODIUM 40 MG: 40 INJECTION SUBCUTANEOUS at 08:24

## 2018-05-02 RX ADMIN — CALCIUM CARBONATE 1250 MG: 1250 SUSPENSION ORAL at 12:35

## 2018-05-02 RX ADMIN — PIPERACILLIN SODIUM,TAZOBACTAM SODIUM 4.5 G: 4; .5 INJECTION, POWDER, FOR SOLUTION INTRAVENOUS at 16:27

## 2018-05-02 RX ADMIN — PIPERACILLIN SODIUM,TAZOBACTAM SODIUM 4.5 G: 4; .5 INJECTION, POWDER, FOR SOLUTION INTRAVENOUS at 04:00

## 2018-05-02 RX ADMIN — BRIVARACETAM 100 MG: 10 SOLUTION ORAL at 09:00

## 2018-05-02 RX ADMIN — SODIUM CHLORIDE, POTASSIUM CHLORIDE, SODIUM LACTATE AND CALCIUM CHLORIDE: 600; 310; 30; 20 INJECTION, SOLUTION INTRAVENOUS at 20:26

## 2018-05-02 RX ADMIN — Medication 2000 UNITS: at 08:23

## 2018-05-02 RX ADMIN — CARBAMAZEPINE 150 MG: 100 SUSPENSION ORAL at 18:26

## 2018-05-02 RX ADMIN — BRIVARACETAM 100 MG: 10 SOLUTION ORAL at 22:52

## 2018-05-02 RX ADMIN — VANCOMYCIN HYDROCHLORIDE 1500 MG: 5 INJECTION, POWDER, LYOPHILIZED, FOR SOLUTION INTRAVENOUS at 11:08

## 2018-05-02 RX ADMIN — CARBAMAZEPINE 150 MG: 100 SUSPENSION ORAL at 00:56

## 2018-05-02 RX ADMIN — HYDROCORTISONE: 1 CREAM TOPICAL at 22:42

## 2018-05-02 RX ADMIN — SODIUM CHLORIDE, POTASSIUM CHLORIDE, SODIUM LACTATE AND CALCIUM CHLORIDE: 600; 310; 30; 20 INJECTION, SOLUTION INTRAVENOUS at 04:46

## 2018-05-02 RX ADMIN — PIPERACILLIN SODIUM,TAZOBACTAM SODIUM 4.5 G: 4; .5 INJECTION, POWDER, FOR SOLUTION INTRAVENOUS at 10:15

## 2018-05-02 RX ADMIN — POTASSIUM & SODIUM PHOSPHATES POWDER PACK 280-160-250 MG 1 PACKET: 280-160-250 PACK at 08:23

## 2018-05-02 RX ADMIN — CARBAMAZEPINE 150 MG: 100 SUSPENSION ORAL at 12:35

## 2018-05-02 RX ADMIN — LEVOTHYROXINE SODIUM 150 MCG: 150 TABLET ORAL at 08:24

## 2018-05-02 RX ADMIN — NYSTATIN AND TRIAMCINOLONE ACETONIDE: 100000; 1 CREAM TOPICAL at 22:43

## 2018-05-02 RX ADMIN — PANTOPRAZOLE SODIUM 10 MG: 40 TABLET, DELAYED RELEASE ORAL at 00:57

## 2018-05-02 RX ADMIN — Medication 1 PACKET: at 22:52

## 2018-05-02 RX ADMIN — PANTOPRAZOLE SODIUM 10 MG: 40 TABLET, DELAYED RELEASE ORAL at 08:23

## 2018-05-02 RX ADMIN — SODIUM CHLORIDE 500 ML: 9 INJECTION, SOLUTION INTRAVENOUS at 18:27

## 2018-05-02 RX ADMIN — CALCIUM CARBONATE 1250 MG: 1250 SUSPENSION ORAL at 22:52

## 2018-05-02 RX ADMIN — CARBAMAZEPINE 150 MG: 100 SUSPENSION ORAL at 05:41

## 2018-05-02 RX ADMIN — SODIUM CHLORIDE 500 ML: 9 INJECTION, SOLUTION INTRAVENOUS at 00:57

## 2018-05-02 RX ADMIN — PIPERACILLIN SODIUM,TAZOBACTAM SODIUM 4.5 G: 4; .5 INJECTION, POWDER, FOR SOLUTION INTRAVENOUS at 22:40

## 2018-05-02 RX ADMIN — POTASSIUM & SODIUM PHOSPHATES POWDER PACK 280-160-250 MG 1 PACKET: 280-160-250 PACK at 16:31

## 2018-05-02 RX ADMIN — HYDROCORTISONE SODIUM SUCCINATE 50 MG: 100 INJECTION, POWDER, FOR SOLUTION INTRAMUSCULAR; INTRAVENOUS at 05:25

## 2018-05-02 RX ADMIN — SODIUM CHLORIDE, POTASSIUM CHLORIDE, SODIUM LACTATE AND CALCIUM CHLORIDE 1000 ML: 600; 310; 30; 20 INJECTION, SOLUTION INTRAVENOUS at 03:43

## 2018-05-02 RX ADMIN — NYSTATIN AND TRIAMCINOLONE ACETONIDE: 100000; 1 CREAM TOPICAL at 08:23

## 2018-05-02 RX ADMIN — PANTOPRAZOLE SODIUM 10 MG: 40 TABLET, DELAYED RELEASE ORAL at 22:51

## 2018-05-02 RX ADMIN — SODIUM CHLORIDE, POTASSIUM CHLORIDE, SODIUM LACTATE AND CALCIUM CHLORIDE: 600; 310; 30; 20 INJECTION, SOLUTION INTRAVENOUS at 08:57

## 2018-05-02 RX ADMIN — SODIUM CHLORIDE, POTASSIUM CHLORIDE, SODIUM LACTATE AND CALCIUM CHLORIDE 500 ML: 600; 310; 30; 20 INJECTION, SOLUTION INTRAVENOUS at 01:45

## 2018-05-02 RX ADMIN — CALCIUM CARBONATE 1250 MG: 1250 SUSPENSION ORAL at 16:30

## 2018-05-02 RX ADMIN — ASPIRIN 81 MG 81 MG: 81 TABLET ORAL at 08:23

## 2018-05-02 RX ADMIN — ACETAMINOPHEN 650 MG: 325 TABLET, FILM COATED ORAL at 00:56

## 2018-05-02 RX ADMIN — HYDROCORTISONE SODIUM SUCCINATE 50 MG: 100 INJECTION, POWDER, FOR SOLUTION INTRAMUSCULAR; INTRAVENOUS at 13:33

## 2018-05-02 ASSESSMENT — ACTIVITIES OF DAILY LIVING (ADL)
NUMBER_OF_TIMES_PATIENT_HAS_FALLEN_WITHIN_LAST_SIX_MONTHS: 1
TRANSFERRING: 4-->COMPLETELY DEPENDENT
SWALLOWING: 2-->DIFFICULTY SWALLOWING FOODS
RETIRED_COMMUNICATION: 2-->DIFFICULTY UNDERSTANDING AND SPEAKING (NOT RELATED TO LANGUAGE BARRIER)
BATHING: 3-->ASSISTIVE EQUIPMENT AND PERSON
COGNITION: 2 - DIFFICULTY WITH ORGANIZING THOUGHTS
AMBULATION: 4-->COMPLETELY DEPENDENT
TOILETING: 4-->COMPLETELY DEPENDENT
FALL_HISTORY_WITHIN_LAST_SIX_MONTHS: YES
DRESS: 3-->ASSISTIVE EQUIPMENT AND PERSON
RETIRED_EATING: 1-->ASSISTIVE EQUIPMENT

## 2018-05-02 NOTE — PROVIDER NOTIFICATION
Spoke to [facility] Officer regarding lactic acid of 2.8-she is aware, will repeat lactic acid at 1600 today.

## 2018-05-02 NOTE — PHARMACY-ADMISSION MEDICATION HISTORY
Admission medication history interview status for the 5/1/2018  admission is complete. See EPIC admission navigator for prior to admission medications     Medication history source reliability:Moderate    Actions taken by pharmacist (provider contacted, etc):  Reviewed medication list with patient's mother.      Additional medication history information not noted on PTA med list :None    Medication reconciliation/reorder completed by provider prior to medication history? No    Time spent in this activity: 15 minutes    Prior to Admission medications    Medication Sig Last Dose Taking? Auth Provider   ACETAMINOPHEN  mg by Per Feeding Tube route every 4 hours as needed for pain 5/1/2018 at Unknown time Yes Unknown, Entered By History   albuterol (2.5 MG/3ML) 0.083% neb solution Take 1 vial by nebulization every 4 hours as needed for shortness of breath / dyspnea or wheezing 5/1/2018 at Unknown time Yes Unknown, Entered By History   aspirin 10 mg/mL 8.1 mLs (81 mg) by Per J Tube route daily 5/1/2018 at 0900 Yes Mariana Venegas MD   bacitracin ointment Apply topically daily as needed for wound care To PEG site.  at prn Yes Unknown, Entered By History   bisacodyl (DULCOLAX) 10 MG Suppository Place 1 suppository (10 mg) rectally daily as needed for constipation  at prn Yes Mariana Venegas MD   Brivaracetam (BRIVIACT) 10 MG/ML solution Take 100 mg by mouth 2 times daily 5/1/2018 at x 2 doses Yes Reported, Patient   calcium carbonate 1250 (500 CA) MG/5ML SUSP suspension 5 mLs (1,250 mg) by Per J Tube route 3 times daily (with meals) 5/1/2018 at x 2 doses Yes Mariana Venegas MD   carBAMazepine (TEGRETOL) 100 MG/5ML suspension Take 150 mg by mouth every 6 hours 5/1/2018 at x 3 doses Yes Unknown, Entered By History   fiber modular (NUTRISOURCE FIBER) packet 1 packet by Per Feeding Tube route 3 times daily 5/1/2018 at x 2 doses Yes Mariana Venegas MD   hydrocortisone (CORTEF) 2 mg/mL 10  mLs (20 mg) by Per J Tube route every morning 5/1/2018 at 0900 Yes Mariana Venegas MD   hydrocortisone (CORTEF) 2 mg/mL Take 5 mLs (10 mg) by mouth every evening 5/1/2018 at administerd in ED Yes Mariana Venegas MD   levothyroxine (SYNTHROID) 25 mcg/mL 6 mLs (150 mcg) by Per J Tube route every morning (before breakfast)  Patient taking differently: 150 mcg by Per J Tube route every morning (before breakfast) Hold tube feeding 1 hour prior and 1 hour after medication administered. 5/1/2018 at 0500 Yes Mariana Venegas MD   melatonin (MELATONIN) 1 MG/ML LIQD liquid 6 mg by Jejunal Tube route nightly as needed for sleep  at prn Yes Unknown, Entered By History   multivitamins with minerals (CERTAVITE/CEROVITE) LIQD liquid 15 mLs by Per J Tube route daily 5/1/2018 at 0900 Yes Mariana Venegas MD   pantoprazole (PROTONIX) SUSP suspension Take 10 mg by mouth daily 5/1/2018 at 0900 Yes Unknown, Entered By History   pantoprazole (PROTONIX) SUSP suspension Take 10 mg by mouth every evening 4/30/2018 at 2100 Yes Unknown, Entered By History   potassium & sodium phosphates (NEUTRA-PHOS) 280-160-250 MG Packet Take 1 packet by mouth 3 times daily 0900, 1500, 2100.  5/1/2018 at x 2 doses Yes Unknown, Entered By History   testosterone cypionate (DEPOTESTOTERONE CYPIONATE) 200 MG/ML injection Inject 100 mg into the muscle See Admin Instructions Every 2 weeks. 4/23/2018 Yes Unknown, Entered By History   VITAMIN D, CHOLECALCIFEROL, PO Take 2,000 Units by mouth daily 5/1/2018 at 0900 Yes Unknown, Entered By History     Mayela Catherine, PharmD, BCPS

## 2018-05-02 NOTE — ED PROVIDER NOTES
History     Chief Complaint:  Fever    HPI   HPI is limited secondary to the patient is nonverbal.  Keyon Farias is a 57 year old male who presents to the ED for the evaluation of a fever. The patient had a fever of 102 at home along with vomiting and chills. The patient's mother reports the patient has a history of frequent infections, where he has had pneumonia previously. She notes the last time he was hospitalized it was never really determined what caused his symptoms. She reports he has been congested over the past few days and suddenly became much more ill today.    Allergies:  Dilantin  Valproic Acid     Medications:    Tylenol  Albuterol  Aspirin  Dulcolax  Briviact  Tegretol  Cortef  Synthroid  Protonix  Depotestoterone    Past Medical History:    Aphasia  DVT  GERD  Panhypopituitarism  Seizures  Septic shock  Spastic hemiplegia  Thyroid disease  Tracheostomy care  TBI  Cerebral artery occlusion  Ventricular fibrillation  Ventricular tachyarrhythmia    Past Surgical History:    Appendectomy  Facial Surgery  Tracheostomy  Vascular Surgery    Family History:    History reviewed. No pertinent family history.     Social History:  Smoking status: Former, quit 1989  Alcohol use: No  Marital Status:  Single [1]     Review of Systems   Unable to perform ROS: Patient nonverbal   Constitutional: Positive for chills and fever.   Respiratory: Positive for cough.    Gastrointestinal: Positive for vomiting.     Physical Exam     Patient Vitals for the past 24 hrs:   BP Temp Temp src Heart Rate Resp SpO2 Height Weight   05/01/18 2148 - 99.2  F (37.3  C) Oral - - - - -   05/01/18 2130 128/66 - - 118 20 97 % - -   05/01/18 2115 107/62 - - 122 20 98 % - -   05/01/18 2045 123/80 - - 123 14 97 % - -   05/01/18 2004 - 102.9  F (39.4  C) Temporal - - - - -   05/01/18 1948 121/63 98.8  F (37.1  C) Oral 141 18 96 % 1.829 m (6') 74.8 kg (165 lb)         Physical Exam  General: Warm to touch  Eyes:  The pupils are equal and  round    Conjunctivae and sclerae are normal  ENT:    The nose is normal    Pinnae are normal  Neck:  Normal range of motion    There is no rigidity noted    Trachea is in the midline  CV:  Tachycardic and regular    No edema  Resp:  Reduced breath sounds at bases and clear elsewhere    Non-labored    No rales  GI:  Abdomen is soft and non-tender, there is no rigidity    No distension  MS:  Normal muscular tone    No asymmetric leg swelling  Skin:  Scabs present on bilateral lower extremities  Neuro:   Awake, alert.      Speech difficult to understand.    Face is symmetric.     Moves all extremities    Emergency Department Course   ECG (20:05:27):  Rate 132 bpm. UT interval 138. QRS duration 88. QT/QTc 308/456. P-R-T axes 46 -71 52. Sinus tachycardia. Left anterior fascicular block. Abnormal ECG. Interpreted at 2007 by Rome Trejo MD.    Imaging:  Radiographic findings were communicated with the patient who voiced understanding of the findings.    X-ray Chest, 2 views:  Lung volumes are low. Mild streaky opacities at the lung  bases best seen on the lateral view could represent atelectasis,  although pneumonia is not excluded. Blunting of the costophrenic  angles which could represent pleural thickening or small pleural  effusions.    Stents partially visualized in the right upper quadrant. Presumed  surgical hardware over the cervical spine.  As read by Radiology.    Laboratory:  CBC: WBC 14.0(H), (L), o/w WNL (HGB 14.2)   CMP: Glucose 114(H), Protein 9.0(H), o/w WNL (Creatinine 0.82)  Lactic Acid (20:29): 3.3(H)  Lactic Acid (22:30): 2.3(H)  UA: GLC 30, pH 7.5(H), Protein Albumin 30, Amorphous Crystals many, o/w Negative    Blood Cultures: Pending  Urine Culture: Pending    Interventions:  2021: NS 1L IV Bolus  2107: Zosyn 4.5 g, IV  2122: NS 1.2L IV Bolus  2146: Vancocin 1750 mg, IV  2224: Cortef 10 mg, Oral  2224: Briviact 100 mg, Oral    Emergency Department Course:  Past medical records,  nursing notes, and vitals reviewed.  8:01pm: I performed an exam of the patient and obtained history, as documented above.     8:29pm: I discussed the patient with his mother of whom is now in the room.  9:48pm: I rechecked the patient. Explained findings to the patient and mother.  10:32pm: I spoke to Dr. Santiago of the hospitalist service who accepts the patient for admission.     Findings and plan explained to the Patient and mother who consents to admission. Discussed the patient with Dr. Santiago, who will admit the patient to a med/surg bed for further monitoring, evaluation, and treatment.     Impression & Plan    CMS Diagnoses:   The patient has signs of Severe Sepsisas evidenced by:    1. 2 SIRS criteria, AND  2. Suspected infection, AND   3. Organ dysfunction: Lactic Acid > 1.9    Time severe sepsis diagnosis confirmed = 20:29 as this was the time when Lactate resulted, and the level was > 1.9      3 Hour Severe Sepsis Bundle Completion:  1. Initial Lactic Acid Result:   Recent Labs   Lab Test  05/01/18   2019  04/02/18   0850  04/01/18   0855   LACT  3.3*  2.1*  2.1*     2. Blood Cultures before Antibiotics: Yes  3. Broad Spectrum Antibiotics Administered: Yes     2107: Zosyn 4.5 g, IV  2146: Vancocin 1750 mg, IV    4. 30 mL/kg ml of IV fluids.  Ideal body weight: 77.6 kg (171 lb 1.2 oz)    Severe Sepsis reassessment:  1. Repeat Lactic Acid Level: 2.3       Medical Decision Making:  Keyon Farias is a 57 year old male with a history of previous of head injury, has been admitted to the hospital and rehabilitation hospitals after having infections. He has had previous pneumonia, urinary tract infections, previous history of VRE and MRSA. On arrival here he is febrile and tachycardic. He feels warm to the touch. He is coughing. Blood pressure has been within the normal ranges here. Heart rate has been in the 1-teens to the 120s, oxygen saturations are normal. Labs returned showing a leukocytosis and elevated  lactic acid level. His lactic acid is 3.3. He was given the bolus of IV fluids of 30 CC's/kg and administered IV antibiotics after blood cultures were obtained. His repeat lactate was performed and clinically he was reassessed and appeared to be improving. He has chest xray which shows some possible early pneumonia and given his history of difficulty swallowing it is possible that he has had aspiration. Given sepsis had a discussion with Dr. Santiago and will administer hydrocortisone IV as he has panhypopituitarism. He was also given his nightly seizure medications. He will be admitted to the hospital.    Diagnosis:    ICD-10-CM   1. Severe sepsis (H) A41.9    R65.20   2. Pneumonia of lower lobe due to infectious organism, unspecified laterality (H) J18.1   3. Sepsis (H) A41.9       Disposition:  Admitted to med/surg    Megan Conway  5/1/2018    EMERGENCY DEPARTMENT  Megan DYKES, am serving as a scribe at 8:01 PM on 5/1/2018 to document services personally performed by Rome Trejo MD based on my observations and the provider's statements to me.        Rome Trejo MD  05/01/18 5259

## 2018-05-02 NOTE — PHARMACY-VANCOMYCIN DOSING SERVICE
Pharmacy Vancomycin Initial Note  Date of Service May 2, 2018  Patient's  1960  57 year old, male    Indication: [facility] Pneumonia    Current estimated CrCl = Estimated Creatinine Clearance: 107.7 mL/min (based on Cr of 0.82).    Creatinine for last 3 days  2018:  8:19 PM Creatinine 0.82 mg/dL    Recent Vancomycin Level(s) for last 3 days  No results found for requested labs within last 72 hours.      Vancomycin IV Administrations (past 72 hours)                   vancomycin (VANCOCIN) 1,750 mg in sodium chloride 0.9 % 500 mL intermittent infusion (mg) 1,750 mg New Bag 18 2146                Nephrotoxins and other renal medications (Future)    Start     Dose/Rate Route Frequency Ordered Stop    18 1000  vancomycin (VANCOCIN) 1,500 mg in sodium chloride 0.9 % 250 mL intermittent infusion      1,500 mg  over 90 Minutes Intravenous EVERY 12 HOURS 18 0023      18 0300  piperacillin-tazobactam (ZOSYN) 4.5 g vial to attach to  mL bag      4.5 g  over 30 Minutes Intravenous EVERY 6 HOURS 18 2343            Contrast Orders - past 72 hours     None                Plan:  1.  Vancomycin  1750 mg IV x 1 given in [facility] ED.  Continue with Vancomycin 1500 mg IV q12h.   2.  Goal Trough Level: 15-20 mg/L   3.  Pharmacy will check trough levels as appropriate in 1-3 Days.    4. Serum creatinine levels will be ordered daily for the first week of therapy and at least twice weekly for subsequent weeks.    5. [place] method utilized to dose vancomycin therapy: Method 2     Adarsh Washington PharmD

## 2018-05-02 NOTE — PROGRESS NOTES
MD Notification    Notified Person: MD    Notified Person Name: Rex MD    Notification Date/Time: 5:28 PM May 2, 2018    Notification Interaction: Paged    Purpose of Notification: Lactic acid recheck is 2.6 @ 1655    Orders Received: Orders placed for bolus and recheck @ 2100     Comments:

## 2018-05-02 NOTE — PROGRESS NOTES
[facility] brief note:    I was paged regarding Mr. Keyon Farias's repeat lactic acid, which appears to still be elevated.     Lactic Acid   Date Value Ref Range Status   05/02/2018 0305 3.2 (H) 0.7 - 2.0 mmol/L Final   05/01/2018 2355 3.3 (H) 0.4 - 2.0 mmol/L Final   05/01/2018 2220 2.3 (H) 0.4 - 2.0 mmol/L Final     I ordered an additional bolus of LR.     Patient has recived 2750 mL of NS and 500 mL of LR, for a total of 3250 mL crystalloid. Patient is now making adequate urine, which was not the case when he arrived from the ED. He received the initial fluid bolus around 8pm last night. His heart rate is decreasing with the additional fluid he is receiving. He is hemodynamically stable with a systolic pressure around 100 mmHg. I will send a repeat lactic acid in 2 hours.    NATHANIEL Cano, CNP  Hospitalist - [place] [facility]  [phone]

## 2018-05-02 NOTE — PROGRESS NOTES
[facility]    Hospitalist Progress Note      Assessment & Plan   Keyon Farias with h/o TBI, aphasia, dysphagia with g-tube, seizure disorder, panhypopit admitted in Feb with sepsis secondary to right lower lobe pneumonia and in April with sepsis of unclear source. He lives with his mother who cares for him appeared in  baseline state of health he developed sudden onset of shaking chills and a fever of 102 and cough.     Sepsis with lactic acidosis due to probable aspiration PNA   - presented with fever and cough  - At presentation his T was 102.9,  (sinus tach) /63, O2 96% on RA.  - CXR - showed streaky basilar infiltrates on the lateral film; UA negative for infection (0 WBC), WBC 14, lactic acid 3.3  - He has been working with a speech therapist with trial of small spoon fulls of food, with no evidence of dysphagia or respiratory problems following. He receives TF from 7 AM to 9 PM and mom keeps HOF up 30 degrees. Suspect aspiration occurs with TFs. 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   - Started on vancomycin and zosyn in the ED.  - S/p 30 cc/kg bolus.  - Lactic acid trending down 3.5--2.8; BC 05/01- pending  - only low grade fever today 99.3  - tachycardia improving   - WBC on admission 14- today up to 15.6 (but he was started on stress doses of steroids)  - continue Zosyn/Vanco for now; consider d/c Vanco if cultures remain negative  - monitor fever curve and WBCs trend  - Follow strict I and Os.   - Continue IVF at 125 cc/h; Further boluses ordered for low UOP or low BP PRN.      Dysphagia, on TF - question recurring aspiration with TF's, leading to recurring presentations for sepsis.   - Nutrition consulted regarding TF's  - HOB up 30 degrees with TF   - Otherwise NPO.   - Hold on further trials with speech therapy at this time given acute infection.  - sister asking for video swallow (aparently was scheduled as outpatient) -but  again- would hold it at this time given acute illness; would try to do it as outpatient in 1 week after d/c once recovered from current infection      Panhypopituitarism    - Place on stress does steroids with 50 mcg TID IV in the context of sepsis- got 3 doses  - will resume PTA  hydrocortisone 20 mg via GT qam and 10 mg qpm  - Continue levothyroxine.   - Resume q 2 weeks Testosterone injections at discharge.      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      H/o necrotizing pancreatitis  - no acute issues, abdominal exam benign  - lipase 283       GERD  - continue PTA PPI     Chronic pruritis with excoriations over left shin and right arm  - Ordered triamcinolone ointment BID with Eucerin PRN for dry skin.      # Pain Assessment:  Current Pain Score 5/2/2018   Patient currently in pain? no   Pain score (0-10) -   Pain location -   Pain descriptors -   rFLACC pain score -   CPOT pain score -   Keyon liriano pain level was assessed and he currently denies pain.      DVT Prophylaxis: Enoxaparin (Lovenox) SQ  Code Status: Full Code    Disposition: Expected discharge in couple of days once clinically better.    Cele Maldonado MD    Interval History   Doing OK, not able to provide any history, looks comfortable, smiling, does not seem to be in pain; discussed with bedside RN    -Data reviewed today: I reviewed all new labs and imaging results over the last 24 hours. I personally reviewed the chest x-ray image(s) showing as above.    Physical Exam   Temp: 97.3  F (36.3  C) Temp src: Axillary BP: 117/64   Heart Rate: 71 Resp: 16 SpO2: 96 % O2 Device: None (Room air)    Vitals:    05/01/18 1948 05/01/18 2342   Weight: 74.8 kg (165 lb) 76 kg (167 lb 8.8 oz)     Vital Signs with Ranges  Temp:  [97.3  F (36.3  C)-102.9  F (39.4  C)] 97.3  F (36.3  C)  Heart Rate:  [] 71  Resp:  [14-22] 16  BP: ()/(49-80) 117/64  SpO2:  [92 %-98 %] 96 %  I/O last 3 completed shifts:  In: 3180  [I.V.:3180]  Out: -     Constitutional: Awake, alert, NAD  Respiratory: Bilateral air entry, no wheezing, no rales, no crackles  Cardiovascular: S1S2, RRR, no murmurs, no rubs  GI: abd- soft, nonT, nonD, BS present; GT in place  Skin/Integumen: excoriations noted over left shin and right forearm without erythema or warmth, no bruising or bleeding, no redness, warmth, or swelling and no rashes  Neuro: AA, unable to do complete neuro exam; follows some commands      Medications     IV fluid REPLACEMENT ONLY       lactated ringers 125 mL/hr at 05/02/18 0857       aspirin  81 mg Per J Tube Daily     Brivaracetam  100 mg Oral BID     calcium carbonate  1,250 mg Oral TID     carBAMazepine  150 mg Oral Q6H     enoxaparin  40 mg Subcutaneous Q24H     ergocalciferol  2,000 Units Oral Daily     fiber modular  1 packet Per Feeding Tube TID     hydrocortisone sodium succinate PF  50 mg Intravenous Q8H     levothyroxine  150 mcg Per J Tube QAM AC     nystatin-triamcinolone   Topical BID     pantoprazole  10 mg Oral BID     piperacillin-tazobactam  4.5 g Intravenous Q6H     potassium & sodium phosphates  1 packet Oral TID     vancomycin (VANCOCIN) IV  1,500 mg Intravenous Q12H       Data     Recent Labs  Lab 05/02/18  0835 05/01/18  2355 05/01/18 2019   WBC 15.6*  --  14.0*   HGB 12.2*  --  14.2   MCV 90  --  89   *  --  123*     --  136   POTASSIUM 4.3  --  3.9   CHLORIDE 110*  --  97   CO2 25  --  30   BUN 15  --  20   CR 0.76  --  0.82   ANIONGAP 6  --  9   STEVE 8.6  --  9.1   *  --  114*   ALBUMIN  --   --  3.7   PROTTOTAL  --   --  9.0*   BILITOTAL  --   --  0.2   ALKPHOS  --   --  112   ALT  --   --  30   AST  --   --  20   LIPASE  --  283  --        Recent Results (from the past 24 hour(s))   XR Chest 2 Views    Narrative    CHEST TWO VIEWS   5/1/2018 8:37 PM     HISTORY: Fever.    COMPARISON: 4/2/2018.      Impression    IMPRESSION: Lung volumes are low. Mild streaky opacities at the lung  bases best  seen on the lateral view could represent atelectasis,  although pneumonia is not excluded. Blunting of the costophrenic  angles which could represent pleural thickening or small pleural  effusions.    Stents partially visualized in the right upper quadrant. Presumed  surgical hardware over the cervical spine.    YELENA MAE MD

## 2018-05-02 NOTE — PROGRESS NOTES
RECEIVING UNIT ED HANDOFF REVIEW    ED Nurse Handoff Report was reviewed by: Oswald Luis on May 1, 2018 at 11:10 PM

## 2018-05-02 NOTE — PROGRESS NOTES
[facility] Nurse Inpatient Adult Pressure Injury (PI) Assessment     Initial Assessment of PI(s) on pt's:   Coccyx/inner buttocks    Data:   Patient History:      per MD note(s): Keyon Farias with h/o TBI, aphasia, dysphagia with g-tube, seizure disorder, panhypopit admitted in Feb with sepsis secondary to right lower lobe pneumonia and in April with sepsis of unclear source. He lives with his mother who cares for him appeared in  baseline state of health he developed sudden onset of shaking chills and a fever of 102. Mother also noted that he has been coughing more during the last couple of days, which she attributed to post nasal drainage. At presentation his T was 102.9,  (sinus tach) /63, O2 96% on RA. CXR personally reviewed showed streaky basilar infiltrates on the lateral film, UA negative for infection (0 WBC), WBC 14, lactic acid 3.3.        Ricci Assessment and sub scores:   Ricci Score  Av.5  Min: 9  Max: 15    Positioning: Pillows    Mattress:  Standard , Atmos Air mattress       Moisture Management:  Incontinence Protocol      Current Diet / Nutrition:           Active Diet Order      NPO for Medical/Clinical Reasons Except for: No Exceptions    Labs:   Recent Labs   Lab Test  18   0835  05/01/18   2019   02/15/18   0610   17   0452   17   0228   ALBUMIN   --   3.7   < >   --    < >   --    --    --    HGB  12.2*  14.2   < >  12.2*   < >  9.4*   < >   --    INR   --    --    --    --    --   1.27*   --    --    WBC  15.6*  14.0*   < >  14.4*   < >  11.7*   < >   --    A1C   --    --    --   6.6*   --    --    --    --    CRP   --    --    --    --    --    --    --   87.0*    < > = values in this interval not displayed.                                                                                                                        Pressure Injury Assessment  (location):   Coccyx/inner buttocks    Wound History:   Redness present on  "admission and is a chronic issue for pt. Pt's mother states they usually use a cortisone cream and it clears up right away.  She does not like to use barrier paste since is often gets globbed on and is difficult to remove.  She also does not like dressings since they get easily soiled if pt does not get to urinal in time etc.  Mother states pt usually is compliant with turns but will wiggle out if not comfortable.      Wound Base: tissue around coccyx and inner upper buttocks is deep pink and slightly dry/rough but is intact and blanchable throughout.  Just above perianal area is a tiny superficial linear fissure.      Drainage:  n/a         Odor: n/a    Pain:  Seems fairly minimal but pt unhappy with palpation of area and was trying to scratch and hit WO during assessment         Intervention:     Patient's chart evaluated.      Ricci Interventions:  Current Ricci Interventions and Care Plan reviewed and updated, appropriate at this time.    Skin assessed, pt repositioned    Orders  Written    Supplies  reviewed    Discussed plan of care with Patient, Family and Nurse           Assessment:     Pressure Injury (PI) located on coccyx: not an active pressure injury, tissue remains intact and blanchable.  Pt at risk for breakdown due to occasional non-compliance and need for nearly total care.    Pt's mother states cortisone cream is what works best for pt and does not want barrier paste or dressings.  Reasonable to try this for now and will monitor for any further issues.           Plan:     Nursing to notify the Provider(s) and re-consult the [facility] Nurse if wound(s) deteriorate(s)or if the wound care plan needs reevaluation.    If pt is refusing to turn or reposition they must be educated on the  potential injury from not off loading pressure.  Then this \"educated refusal\" needs to be documented as an \"educated refusal to turn/ reposition\" and document if alert, etc.      Plan for skin/wound care to coccyx:  BID and " prn:  - cleanse with Rita perineal lotion and soft wipes  - apply cortisone cream to reddened area per MD orders  - leave open to air, no dressings at this time  - reposition pt every 1-2 hrs, side to side only  - HOB as low as tolerated  - consider Z-flow pad (# [id number]) to help keep pt on side  - notify WOC if skin worsening    WOC Nurse will return: weekly and prn

## 2018-05-02 NOTE — ED NOTES
[facility]  ED Nurse Handoff Report    ED Chief complaint: Fever (fever of 102 at home, also c/o vomiting and chills)      ED Diagnosis:   Final diagnoses:   Severe sepsis (H)   Pneumonia of lower lobe due to infectious organism, unspecified laterality (H)   Sepsis (H)       Code Status: Full Code    Allergies:   Allergies   Allergen Reactions     Dilantin [Phenytoin Sodium]      Valproic Acid      Toxicity c bone marrow suspension, elevated ammonia levels        Activity level - Baseline/Home:  Total Care    Activity Level - Current:   Total Care     Needed?: No    Isolation: Yes  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 Mother reports the pt had a fever of 102F at home. Dry cough is noted in the ED. Fever initially 102 in ED and decreases to 99.2 after fluid resuscitation. Pt given total of 2250mL 0.9NS bolus for sepsis protocol.   Focused Assessment: Unable to assess orientation, however, pt is able to follow commands. Skin hot/dry. Non-labored breathing. Pt is baseline aphasic. Pt is happy and smiling.   Tests Performed: cxr, labs, ekg  Abnormal Results: elevated lactic and WBC, crystals in urine, decreased platelets  Treatments provided: 2250mL 0.9NS bolus, briviact and cortef given through Gtube and flushed with water, zosyn IV, vancomycin IV     Family Comments: mother at bedside is helpful with care    OBS brochure/video discussed/provided to patient: N/A    ED Medications:   Medications   lactated ringers infusion (not administered)    vancomycin (VANCOCIN) 1,750 mg in sodium chloride 0.9 % 500 mL intermittent infusion (1,750 mg Intravenous New Bag 5/1/18 2146)   carBAMazepine (TEGretol) suspension 150 mg (not administered)   hydrocortisone sodium succinate PF (solu-CORTEF) injection 50 mg (not administered)   0.9% sodium chloride BOLUS (0 mLs Intravenous Stopped 5/1/18 2121)   lactated ringers BOLUS 2,244 mL (0 mL/kg × 74.8 kg Intravenous Stopped 5/1/18 2119)   piperacillin-tazobactam (ZOSYN) 4.5 g vial to attach to  mL bag (0 g Intravenous Stopped 5/1/18 2146)   0.9% sodium chloride BOLUS (0 mLs Intravenous Stopped 5/1/18 2220)   hydrocortisone (CORTEF) tablet 10 mg (10 mg Oral Given 5/1/18 2224)   Brivaracetam (BRIVIACT) solution 100 mg (100 mg Oral Given 5/1/18 2224)       Drips infusing?:  Yes    For the majority of the shift this patient was Green.   Interventions performed were .    Severe Sepsis OR Septic Shock Diagnosis Present:   Yes    Per the ED Provider, Time Zero for severe sepsis or septic shock is:      3 Hour Severe Sepsis Bundle Completion:  1. Initial Lactic Acid Result:   Recent Labs   Lab Test  05/01/18 2220 05/01/18 2019 04/02/18   0850   LACT  2.3*  3.3*  2.1*     2. Blood Cultures before Antibiotics: Yes  3. Broad Spectrum Antibiotics Administered:     Anti-infectives (Future)    Start     Dose/Rate Route Frequency Ordered Stop    05/01/18 2052  vancomycin (VANCOCIN) 1,750 mg in sodium chloride 0.9 % 500 mL intermittent infusion      1,750 mg  over 2 Hours Intravenous ONCE 05/01/18 2051          4. 2250 ml of IV fluids have been given so far      6 Hour Severe Sepsis Bundle Completion:    1. Repeat Lactic Acid Level:   Last result   Lab Results   Component Value Date    LACT 2.3 (H) 05/01/2018     2. Patient currently on Vasopressors =  No      ED NURSE PHONE NUMBER: [phone]

## 2018-05-02 NOTE — H&P
History and Physical     Keyon Farias MRN# [id number]   YOB: 1960 Age: 57 year old      Date of Admission:  5/1/2018    Primary care provider: Carlos Gomez          Assessment and Plan:     Keyon Farias with h/o TBI, aphasia, dysphagia with g-tube, seizure disorder, panhypopit admitted in Feb with sepsis secondary to right lower lobe pneumonia and in April with sepsis of unclear source. He lives with his mother who cares for him appeared in hs baseline state of health he developed sudden onset of shaking chills and a fever of 102. Mother also noted that he has been coughing more during the last couple of days, which she attributed to post nasal drainage. At presentation his T was 102.9,  (sinus tach) /63, O2 96% on RA. CXR personally reviewed showed streaky basilar infiltrates on the lateral film, UA negative for infection (0 WBC), WBC 14, lactic acid 3.3.     Sepsis with elevated lactic acidosis due to probable aspiration PNA - He has been working with a speech therapist with trial of small spoon fulls of food, with no evidence of dysphagia or respiratory problems following. He receives TF from 7 AM to 9 PM and mom keeps HOF up 30 degrees. Suspect aspiration occurs with TFs. 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   - Started on vancomycin and zosyn in the ED.  - S/p 30 cc/kg bolus.  - Lactic acid trending down, MAP > 65, tachycardia resolving. Follow up lactic acid in 2 hours.   - Follow strict I and Os.   - Continue IVF at 125 cc/h. Further boluses ordered for low UOP or low BP PRN.     Dysphagia, on TF - question recurring aspiration with TF's, leading to recurring presentations for sepsis.   - Nutrition consulted regarding TF's. ? Decreasing rate and prolonging course to decrease risk of aspiration.   - HOB up 30 degrees with TF   - Otherwise NPO.   - Hold on further trials with speech therapy at this time.      Panhypopituitarism    -  Place on stress does steroids with 50 mcg TID IV in the context of sepsis > then resume PO hydrocortisone   - Continue levothyroxine.   - Resume q 2 weeks Testosterone injections at discharge.     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     H/o necrotizing pancreatitis - abdominal exam benign, but I am going to check lipase given minimal infiltrates on CXR and recurring presentations of sepsis with fever.     GERD  - continue PTA PPI    Chronic pruritis with excoriations over left shin and right arm  - Ordered triamcinolone ointment BID with Eucerin PRN for dry skin.     DVT prophylaxis  - Start Lovenox SQ, h/o UE DVT.     Code Status   - Discussed with mother. She would like him to be full.     Dispo  - Admit to inpatient. Mother has home health nurses coming in 3-4 x a week and would like to bring him home.                   Chief Complaint:   Fevers, chills, cough.          History of Present Illness:     Keyon Farias is a 57 year old male who presents to the ED for the evaluation of a fever. The patient had a fever of 102 at home along with vomiting and chills. The patient's mother reports the patient has a history of frequent infections. He was admitted for RLL PNA in February and sepsis of unclear etiology, treated as aspriation PNA in April. He is on chronic tube feeds from 7 AM to 9 PM, and she has not noted any difficulty with this of evidence of aspiration. She keeps HOB elevated. Has been working with a speech therapist with trials of spoon fulls with speech therapist only, but otherwise is NPO. She had noted that he seemed to have more secretions with a cough the last couple of days but otherwise was in his normal state of health until he developed the acute onset of fevers with chills this evening. He had an episode of vomiting following the development of fever, but otherwise has had no recent vomiting or stomach discomfort. He has chronic pruritis with  excoriations over left leg and right arm, without signs of developing cellulitis.     ECG (20:05:27):  Rate 132 bpm. IL interval 138. QRS duration 88. QT/QTc 308/456. P-R-T axes 46 -71 52. Sinus tachycardia. Left anterior fascicular block. Abnormal ECG. Interpreted at 2007 by Rome Trejo MD.     Imaging:  Radiographic findings were communicated with the patient who voiced understanding of the findings.     X-ray Chest, 2 views:  Lung volumes are low. Mild streaky opacities at the lung  bases best seen on the lateral view could represent atelectasis,  although pneumonia is not excluded. Blunting of the costophrenic  angles which could represent pleural thickening or small pleural  effusions.    Stents partially visualized in the right upper quadrant. Presumed  surgical hardware over the cervical spine.  As read by Radiology.     Laboratory:  CBC: WBC 14.0(H), (L), o/w WNL (HGB 14.2)   CMP: Glucose 114(H), Protein 9.0(H), o/w WNL (Creatinine 0.82)  Lactic Acid (20:29): 3.3(H)  Lactic Acid (3.3).   UA: GLC 30, pH 7.5(H), Protein Albumin 30, Amorphous Crystals many, o/w Negative     Blood Cultures: Pending  Urine Culture: Pending     Interventions:  Medications   lactated ringers infusion (not administered)   vancomycin (VANCOCIN) 1,750 mg in sodium chloride 0.9 % 500 mL intermittent infusion (1,750 mg Intravenous New Bag 5/1/18 2146)   0.9% sodium chloride BOLUS (0 mLs Intravenous Stopped 5/1/18 2121)   lactated ringers BOLUS 2,244 mL (0 mL/kg × 74.8 kg Intravenous Stopped 5/1/18 2119)   piperacillin-tazobactam (ZOSYN) 4.5 g vial to attach to  mL bag (0 g Intravenous Stopped 5/1/18 2146)   0.9% sodium chloride BOLUS (1,200 mLs Intravenous New Bag 5/1/18 2122)   2021: NS 1L IV Bolus  2107: Zosyn 4.5 g, IV  2122: NS 1.2L IV Bolus  2146: Vancocin 1750 mg, IV      # Pain Assessment:  Current Pain Score 4/4/2018   Patient currently in pain? denies   Pain score (0-10) -   Pain location -   Pain  descriptors -   rFLACC pain score -   CPOT pain score -                Past Medical History:       1.  TBI with right spastic hemiplegia.   2.  Aphasia secondary #1.   3.  Dysphagia with G-tube placement, s/p trach  4.  Seizure disorder secondary to #1.   5.  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   6.  History of V-fib/V-tach arrest.   7.  GERD.   8.  History of right upper extremity DVT.   9.  History of necrotizing pancreatitis status post cystogastrostomy tube placement with most recent hospitalization in 02/2017.   10. GERD  11. H/o DVT of the upper extremity  12. H/o recurrent presentations for sepsis with UTI and pneumonia          Past Surgical History:     Past Surgical History:   Procedure Laterality Date     ENDOSCOPIC ULTRASOUND UPPER GASTROINTESTINAL TRACT (GI) N/A 1/30/2017    Procedure: ENDOSCOPIC ULTRASOUND, ESOPHAGOSCOPY / UPPER GASTROINTESTINAL TRACT (GI);  Surgeon: Jus Montana MD;  [facility]: [facility]     ENDOSCOPIC ULTRASOUND, ESOPHAGOSCOPY, GASTROSCOPY, DUODENOSCOPY (EGD), NECROSECTOMY N/A 2/7/2017    Procedure: ENDOSCOPIC ULTRASOUND, ESOPHAGOSCOPY, GASTROSCOPY, DUODENOSCOPY (EGD), NECROSECTOMY;  Surgeon: Jack Marcus MD;  Location:  OR     ESOPHAGOSCOPY, GASTROSCOPY, DUODENOSCOPY (EGD), COMBINED  3/13/2014    Procedure: COMBINED ESOPHAGOSCOPY, GASTROSCOPY, DUODENOSCOPY (EGD), BIOPSY SINGLE OR MULTIPLE;  gastroscopy;  Surgeon: Digna Rhodes MD;  Location:  GI     ESOPHAGOSCOPY, GASTROSCOPY, DUODENOSCOPY (EGD), COMBINED N/A 12/6/2016    Procedure: COMBINED ESOPHAGOSCOPY, GASTROSCOPY, DUODENOSCOPY (EGD);  Surgeon: Digna Rhodes MD;  Location: [facility]     ESOPHAGOSCOPY, GASTROSCOPY, DUODENOSCOPY (EGD), COMBINED N/A 2/7/2017    Procedure: COMBINED ENDOSCOPIC ULTRASOUND, ESOPHAGOSCOPY, GASTROSCOPY, DUODENOSCOPY (EGD), FINE NEEDLE ASPIRATE/BIOPSY;  Surgeon: Too Thakur MD;  Location: [facility] OR     HEAD & NECK  SURGERY      reconstructive facial surgery following accident in 1989     LAPAROSCOPIC APPENDECTOMY  7/30/2013    Procedure: LAPAROSCOPIC APPENDECTOMY;  LAPAROSCOPIC APPENDECTOMY;  Surgeon: Manish Pierce MD;  Location:  OR     LAPAROSCOPIC ASSISTED INSERTION TUBE GASTROTOMY N/A 9/7/2016    Procedure: LAPAROSCOPIC ASSISTED INSERTION TUBE GASTROSTOMY;  Surgeon: Manish Pierce MD;  Location:  OR     ORTHOPEDIC SURGERY      right hand repair     TRACHEOSTOMY N/A 9/3/2016    Procedure: TRACHEOSTOMY;  Surgeon: João Ortiz MD;  Location:  OR     TRACHEOSTOMY N/A 12/2/2016    Procedure: TRACHEOSTOMY;  Surgeon: João Ortiz MD;  Location:  OR     VASCULAR SURGERY              Social History:     Social History   Substance Use Topics     Smoking status: Former Smoker     Quit date: 4/23/1989     Smokeless tobacco: Never Used     Alcohol use No   Lives with mom who is primary care giver. Home health nurses come in 3-4 times a week. She hospitalization for seizures in 2016, he was actually participating in work program, and she is hopeful he will be able to again.           Family History:   Reviewed and noncontributory           Immunizations:     Immunization History   Administered Date(s) Administered     Influenza (IIV3) PF 01/23/2013     Influenza Vaccine IM 3yrs+ 4 Valent IIV4 12/09/2016, 10/05/2017            Allergies:     Allergies   Allergen Reactions     Dilantin [Phenytoin Sodium]      Valproic Acid      Toxicity c bone marrow suspension, elevated ammonia levels              Medications:     Prescription Medications as of 5/1/2018             ACETAMINOPHEN  mg by Per Feeding Tube route every 4 hours as needed for pain    albuterol (2.5 MG/3ML) 0.083% neb solution Take 1 vial by nebulization every 4 hours as needed for shortness of breath / dyspnea or wheezing    aspirin 10 mg/mL 8.1 mLs (81 mg) by Per J Tube route daily    bacitracin ointment Apply topically daily as  needed for wound care To PEG site.    bisacodyl (DULCOLAX) 10 MG Suppository Place 1 suppository (10 mg) rectally daily as needed for constipation    Brivaracetam (BRIVIACT) 10 MG/ML solution Take 100 mg by mouth 2 times daily    calcium carbonate 1250 (500 CA) MG/5ML SUSP suspension 5 mLs (1,250 mg) by Per J Tube route 3 times daily (with meals)    carBAMazepine (TEGRETOL) 100 MG/5ML suspension Take 150 mg by mouth every 6 hours    fiber modular (NUTRISOURCE FIBER) packet 1 packet by Per Feeding Tube route 3 times daily    hydrocortisone (CORTEF) 2 mg/mL 10 mLs (20 mg) by Per J Tube route every morning    hydrocortisone (CORTEF) 2 mg/mL Take 5 mLs (10 mg) by mouth every evening    levothyroxine (SYNTHROID) 25 mcg/mL 6 mLs (150 mcg) by Per J Tube route every morning (before breakfast)    melatonin (MELATONIN) 1 MG/ML LIQD liquid 6 mg by Jejunal Tube route nightly as needed for sleep    miconazole (MICATIN; MICRO GUARD) 2 % powder Apply topically every hour as needed for other (topical candidiasis)    multivitamins with minerals (CERTAVITE/CEROVITE) LIQD liquid 15 mLs by Per J Tube route daily    nystatin (MYCOSTATIN) 769362 UNIT/GM POWD Apply three times daily to the groin for 5 days.    pantoprazole (PROTONIX) SUSP suspension Take 20 mg by mouth daily     potassium & sodium phosphates (NEUTRA-PHOS) 280-160-250 MG Packet Take 1 packet by mouth 3 times daily 0900, 1500, 2100.     testosterone cypionate (DEPOTESTOTERONE CYPIONATE) 200 MG/ML injection Inject 100 mg into the muscle See Admin Instructions Every 2-3 weeks.    VITAMIN D, CHOLECALCIFEROL, PO Take 2,000 Units by mouth daily      Facility Administered Medications as of 5/1/2018             0.9% sodium chloride BOLUS Inject 1,000 mLs into the vein once    0.9% sodium chloride BOLUS Inject 1,200 mLs into the vein once    Brivaracetam (BRIVIACT) solution 100 mg Take 10 mLs (100 mg) by mouth once    carBAMazepine (TEGretol) suspension 150 mg Starting on 5/2/2018.  Take 7.5 mLs (150 mg) by mouth once    hydrocortisone (CORTEF) tablet 10 mg Take 1 tablet (10 mg) by mouth once    lactated ringers BOLUS 2,244 mL Inject 2,244 mLs into the vein once    lactated ringers infusion Inject into the vein continuous    piperacillin-tazobactam (ZOSYN) 4.5 g vial to attach to  mL bag Inject 4.5 g into the vein once    vancomycin (VANCOCIN) 1,750 mg in sodium chloride 0.9 % 500 mL intermittent infusion Inject 1,750 mg into the vein once                Review of Systems:   The 10 point Review of Systems is negative other than noted in the HPI              Physical Exam:   Blood pressure 128/66, temperature 99.2  F (37.3  C), temperature source Oral, resp. rate 20, height 1.829 m (6'), weight 74.8 kg (165 lb), SpO2 97 %.    Constitutional:   awake, alert, cooperative, no apparent distress, and appears stated age, smiles, unable to answer questions due to aphasia. Follows simple commands.      Eyes:   Lids and lashes normal, pupils equal, round and reactive to light, extra ocular muscles intact, sclera clear, conjunctiva normal     ENT:   Normocephalic, without obvious abnormality, atramatic, oral pharynx with dry mucus membranes, tonsils without erythema or exudates     Neck:   Supple, symmetrical, trachea midline, no adenopathy, thyroid symmetric, not enlarged and no tenderness, skin normal     Lungs:   No increased work of breathing, good air exchange, clear to auscultation bilaterally, actually no crackles or wheezing     Cardiovascular:   Regular rate and rhythm, normal S1 and S2, no S3 or S4, and systolic murmur noted. Extremities are warm. No edema.      Abdomen:   Normal bowel sounds, soft, non-distended, non-tender, no masses palpated, no hepatosplenomegally     Musculoskeletal:   There is no redness, warmth, or swelling of the joints. Diffuse atrophy of the muscles.       Neurologic:   Severe aphasia and only able to follow simple commands.  Cranial nerves II-XII are grossly  intact.  Minimal movement 4 extremities. Unable to perform full motor exam.      Neuropsychiatric:   General: smiles back but unable to answer questions and follow most commands.      Skin:   excoriations noted over left shin and right forearm without erythema or warmth, no bruising or bleeding, no redness, warmth, or swelling and no rashes            Data:     Results for orders placed or performed during the hospital encounter of 05/01/18   XR Chest 2 Views    Narrative    CHEST TWO VIEWS   5/1/2018 8:37 PM     HISTORY: Fever.    COMPARISON: 4/2/2018.      Impression    IMPRESSION: Lung volumes are low. Mild streaky opacities at the lung  bases best seen on the lateral view could represent atelectasis,  although pneumonia is not excluded. Blunting of the costophrenic  angles which could represent pleural thickening or small pleural  effusions.    Stents partially visualized in the right upper quadrant. Presumed  surgical hardware over the cervical spine.    YELENA MAE MD   Comprehensive metabolic panel   Result Value Ref Range    Sodium 136 133 - 144 mmol/L    Potassium 3.9 3.4 - 5.3 mmol/L    Chloride 97 94 - 109 mmol/L    Carbon Dioxide 30 20 - 32 mmol/L    Anion Gap 9 3 - 14 mmol/L    Glucose 114 (H) 70 - 99 mg/dL    Urea Nitrogen 20 7 - 30 mg/dL    Creatinine 0.82 0.66 - 1.25 mg/dL    GFR Estimate >90 >60 mL/min/1.7m2    GFR Estimate If Black >90 >60 mL/min/1.7m2    Calcium 9.1 8.5 - 10.1 mg/dL    Bilirubin Total 0.2 0.2 - 1.3 mg/dL    Albumin 3.7 3.4 - 5.0 g/dL    Protein Total 9.0 (H) 6.8 - 8.8 g/dL    Alkaline Phosphatase 112 40 - 150 U/L    ALT 30 0 - 70 U/L    AST 20 0 - 45 U/L   CBC with platelets differential   Result Value Ref Range    WBC 14.0 (H) 4.0 - 11.0 10e9/L    RBC Count 4.63 4.4 - 5.9 10e12/L    Hemoglobin 14.2 13.3 - 17.7 g/dL    Hematocrit 41.0 40.0 - 53.0 %    MCV 89 78 - 100 fl    MCH 30.7 26.5 - 33.0 pg    MCHC 34.6 31.5 - 36.5 g/dL    RDW 13.9 10.0 - 15.0 %    Platelet Count 123 (L)  150 - 450 10e9/L    Diff Method Automated Method     % Neutrophils 64.5 %    % Lymphocytes 21.2 %    % Monocytes 7.9 %    % Eosinophils 5.7 %    % Basophils 0.5 %    % Immature Granulocytes 0.2 %    Nucleated RBCs 0 0 /100    Absolute Neutrophil 9.0 (H) 1.6 - 8.3 10e9/L    Absolute Lymphocytes 3.0 0.8 - 5.3 10e9/L    Absolute Monocytes 1.1 0.0 - 1.3 10e9/L    Absolute Eosinophils 0.8 (H) 0.0 - 0.7 10e9/L    Absolute Basophils 0.1 0.0 - 0.2 10e9/L    Abs Immature Granulocytes 0.0 0 - 0.4 10e9/L    Absolute Nucleated RBC 0.0    Lactic acid whole blood   Result Value Ref Range    Lactic Acid 3.3 (H) 0.7 - 2.0 mmol/L   UA with Microscopic   Result Value Ref Range    Color Urine Yellow     Appearance Urine Cloudy     Glucose Urine 30 (A) NEG^Negative mg/dL    Bilirubin Urine Negative NEG^Negative    Ketones Urine Negative NEG^Negative mg/dL    Specific Gravity Urine 1.017 1.003 - 1.035    Blood Urine Negative NEG^Negative    pH Urine 7.5 (H) 5.0 - 7.0 pH    Protein Albumin Urine 30 (A) NEG^Negative mg/dL    Urobilinogen mg/dL 2.0 0.0 - 2.0 mg/dL    Nitrite Urine Negative NEG^Negative    Leukocyte Esterase Urine Negative NEG^Negative    Source Catheterized Urine     WBC Urine 0 0 - 5 /HPF    RBC Urine 0 0 - 2 /HPF    Amorphous Crystals Many (A) NEG^Negative /HPF   Lactic acid   Result Value Ref Range    Lactic Acid 2.3 (H) 0.4 - 2.0 mmol/L   EKG 12-lead, tracing only   Result Value Ref Range    Interpretation ECG Click View Image link to view waveform and result

## 2018-05-02 NOTE — PLAN OF CARE
Problem: Patient Care Overview  Goal: Plan of Care/Patient Progress Review  Outcome: Improving  Afebrile, no pain. Total care. Nonverbal. Alert. Sleeping between cares. One formed brown BM. K 4.3. Cr 0.76. Lactic acid 3.5 and 2.8-monitoring-recheck at 1600. Blood cultures NTD. Lerma draining clear yellow urine. Scabs, rash is various areas of body. WOCN consulted for coccyx wound. Coccyx blanchable red. Turned and repositioned from side to side. Tube feed started 1245-running at 60cc/hr. On room air. Diminished lung sounds. Nonproductive cough. Tele NSR. Receiving abx and IV fluids. Informed Dr Maldonado that pt has an outpt video swallow scheduled and sister would like it to be done while he's here-recommended to wait to perform outpt as pt came in with sepsis and may not eval accurately. Sister left before admit doc req could be completed, pt not able to answer questions.

## 2018-05-02 NOTE — PROGRESS NOTES
Notified Ricky SETHI, NP, LA abnormal and 3.3... NP to assess pt at bedside.. Will continue to monitor       Update 0305... LA 3.2, NP Ricky SETHI Notified, additional Bolus ordered. Will continue to monitor.     Update 0500..  B/p now decreased in the 80s, Pt HR and temp improved 80s and 98.0s ... NP Ricky SETHI Notified and will come to assess Pt.. Will continue to monitor.

## 2018-05-02 NOTE — PROGRESS NOTES
House NP Brief Note:  Paged regarding lactate 3.5 this morning. He has received 3200- cc crystalloid and is now making urine. Vital signs are stable, per bedside nurse his mentation is at baseline. - cc/hour. Antibiotic coverage is appropriate.    Plan:  Appears to be improving. Will repeat lactate at 10: 00 AM today and reassess if further boluses are needed.    NATHANIEL Bryan, CNP  Hospitalist Service, House Officer  [facility]     Text Page  Pager: [phone]

## 2018-05-02 NOTE — PLAN OF CARE
Problem: Pneumonia (Adult)  Goal: Signs and Symptoms of Listed Potential Problems Will be Absent, Minimized or Managed (Pneumonia)  Signs and symptoms of listed potential problems will be absent, minimized or managed by discharge/transition of care (reference Pneumonia (Adult) CPG).  Outcome: Declining  Pt alert, nonverbal at baseline. VSS erratic, Soft B/Ps 90s and 80s, Hr tachy 115's, temp 100.7. Multiple boluses ordered.  LS diminished and coarse on Rt side.  Pt on 2x of IV Anbx, Vanco initiated. Lerma placed for Strict I & Os now. Incontinent multiple times and making U/O. Wound on coccyx, Rt arm, Rash in groin and on Lt leg. Strict NPO, Pt has PEG tube from home and is usually on TF. Turn and repo every 2 hours. Mother  Is POA  And will be here this AM to help with admission. Contact ISO, Seizure precautions.  D/C pending, Up with lift

## 2018-05-02 NOTE — CONSULTS
CLINICAL NUTRITION SERVICES  -  ASSESSMENT NOTE      Recommendations Ordered by Registered Dietitian (RD):   Isosource 1.5 @ 60 mL/hr, continuous  Total Enteral Provisions: 2160 jamel (28 jamel/kg), 98 gm pro (1.3 gm/kg), 22 gm fiber, 253 gm CHO, 1100 mL H2O  Nutrisource Fiber, 1 packet TID = 9 gm fiber  Free Water Flush: Std 60 mL q 4 hours while on IVF.     Malnutrition:   % Weight Loss:  None noted  % Intake:  No decreased intake noted  Subcutaneous Fat Loss:  None observed  Muscle Loss:  None observed  Fluid Retention:  None noted    Malnutrition Diagnosis: Patient does not meet two of the above criteria necessary for diagnosing malnutrition       REASON FOR ASSESSMENT  Keyon Farias is a 57 year old male seen by Registered Dietitian for Provider Order - Registered Dietitian to Assess and Order TF per Medical Nutrition protocol      NUTRITION HISTORY  - Information obtained from the EMR and pt's mother, Savannah, with whom he lives.  Pt is well-known to our service. He has been on TF since August 2016, his G-J tube was last exchanged 3/13/18, 18FR TORY.  He has been on a daytime feeding schedule from 7 AM to 9 PM:  Isosource 1.5, 5.5 cans daily. Savannah is not sure of the rate or H2O flushes.  He also receives Nutrisource Fiber, 1 packet TID.  This regimen provides 2063 jamel (27 jamel/kg), 94 gm protein (1.2 gm/kg), 1045 mL H2O, 30 gm fiber, 242 gm CHO.  Per H&P, SLP has been working with pt but he is otherwise NPO.    Other home meds include Ca, Vit D, Neutraphos TID, Certavite and Synthroid.   Savannah used to hold the TF 1 hr before and after Synthroid administration, as per [facility]'s routine but now the Synthroid is given before the daytime feeding starts.      CURRENT NUTRITION ORDERS  Diet Order:     NPO       PHYSICAL FINDINGS  Observed  No nutrition-related physical findings observed  Obtained from Chart/Interdisciplinary Team  Wound on coccyx per nsg notes - WOCN consult ordered  Diffuse muscle atrophy  "    ANTHROPOMETRICS  Height: 6' 0\"  Weight: 167 lbs 8.79 oz (76 kg  Body mass index is 22.72 kg/(m^2).  Weight Status:  Normal BMI  IBW: 81 kg +/- 10%  % IBW: 94%  Weight History: Wt has been stable at ~168# for > 6 months.  Wt Readings from Last 10 Encounters:   05/01/18 76 kg (167 lb 8.8 oz)   04/04/18 72.6 kg (160 lb)   03/13/18 70.3 kg (155 lb)   02/21/18 73 kg (161 lb)   12/31/17 76.2 kg (168 lb)   10/07/17 77.3 kg (170 lb 6.7 oz)   07/27/17 73.9 kg (163 lb)   06/15/17 70.3 kg (155 lb)   06/07/17 70.7 kg (155 lb 12.8 oz)   05/15/17 74.8 kg (165 lb)       LABS  Albe 3.7    MEDICATIONS  LR  @ 125 mL/hr  Nutrasource Fiber TID  Nutraphos      ASSESSED NUTRITION NEEDS PER APPROVED PRACTICE GUIDELINES:  Dosing Weight 76 kg - admit wt  Estimated Energy Needs: 2483-5052 kcals (25-30 Kcal/Kg)  Justification: maintenance  Estimated Protein Needs:  grams protein (1.2-1.5 g pro/Kg)  Justification: preservation of lean body mass      MALNUTRITION:  % Weight Loss:  None noted  % Intake:  No decreased intake noted  Subcutaneous Fat Loss:  None observed  Muscle Loss:  None observed  Fluid Retention:  None noted    Malnutrition Diagnosis: Patient does not meet two of the above criteria necessary for diagnosing malnutrition      NUTRITION DIAGNOSIS:  No nutrition diagnosis identified at this time       NUTRITION INTERVENTIONS  Recommendations / Nutrition Prescription  Nutrition Support Enteral:  Type of Feeding Tube: 18Fr GJ tube  Enteral Frequency:  Continuous  Enteral Regimen: Isosource 1.5 @ 60 mL/hr  Total Enteral Provisions: 2160 jamel (28 jamel/kg), 98 gm pro (1.3 gm/kg), 22 gm fiber, 253 gm CHO, 1100 mL H2O  Nutrisource Fiber, 1 packet TID = 9 gm fiber  Free Water Flush: Std 60 mL q 4 hours while on IVF.      Implementation  Nutrition education: No education needs assessed at this time  EN Schedule - ordered the above TF      Nutrition Goals  Isosource 1.5 @ goal of 60 mL/hr will meet 100% of assessed " needs      MONITORING AND EVALUATION:  Progress towards goals will be monitored and evaluated per protocol and Practice Guidelines    Unique Schultz RD  Pager [phone] (M-F)            [phone] (W/E & Hol)

## 2018-05-02 NOTE — PLAN OF CARE
Problem: Patient Care Overview  Goal: Plan of Care/Patient Progress Review  Outcome: Declining  Pt alert, nonverbal at baseline. VSS erratic, Soft B/Ps 90s and 80s, Hr tachy 115's, temp 100.7. Multiple boluses ordered.  LS diminished and coarse on Rt side.  Pt on 2x of IV Anbx, Vanco initiated. Lerma placed for Strict I & Os now. Incontinent multiple times and making U/O. Wound on coccyx, Rt arm, Rash in groin and on Lt leg. Strict NPO, Pt has PEG tube from home and is usually on TF. Turn and repo every 2 hours. Mother  Is POA  And will be here this AM to help with admission. Contact ISO, Seizure precautions.  D/C pending, Up with lift

## 2018-05-02 NOTE — PROGRESS NOTES
Admission    Patient arrives to room 608 via cart from ED.  Care plan note: Pt alert, nonverbal at baseline. VSS erratic, Soft B/Ps 90s and 80s, Hr tachy 115's, temp 100.7. Multiple boluses ordered.  LS diminished and coarse on Rt side.  Pt on 2x of IV Anbx, Vanco initiated. Lerma placed for Strict I & Os now. Incontinent multiple times and making U/O. Wound on coccyx, Rt arm, Rash in groin and on Lt leg. Strict NPO, Pt has PEG tube from home and is usually on TF. Turn and repo every 2 hours. Mother  Is POA  And will be here this AM to help with admission. Contact ISO, Seizure precautions.  D/C pending, Up with lift     Inpatient nursing criteria listed below were met:    PCD's Documented: Yes  Skin issues/needs documented :Yes  Isolation education started/completed No  Fall Prevention: Care plan updated, Education given and documented Yes  Care Plan initiated: Yes  Home medications documented in belongings flowsheet: NA  Patient belongings documented in AVIcodes flowsheet: Yes  Reminder note (belongings/ medications) placed in discharge instructions:No  Admission profile/ required documentation complete: No

## 2018-05-03 LAB
ANION GAP SERPL CALCULATED.3IONS-SCNC: 7 MMOL/L (ref 3–14)
BUN SERPL-MCNC: 13 MG/DL (ref 7–30)
CALCIUM SERPL-MCNC: 8.3 MG/DL (ref 8.5–10.1)
CHLORIDE SERPL-SCNC: 111 MMOL/L (ref 94–109)
CO2 SERPL-SCNC: 26 MMOL/L (ref 20–32)
CREAT SERPL-MCNC: 0.77 MG/DL (ref 0.66–1.25)
ERYTHROCYTE [DISTWIDTH] IN BLOOD BY AUTOMATED COUNT: 14.6 % (ref 10–15)
GFR SERPL CREATININE-BSD FRML MDRD: >90 ML/MIN/1.7M2
GLUCOSE BLDC GLUCOMTR-MCNC: 173 MG/DL (ref 70–99)
GLUCOSE SERPL-MCNC: 138 MG/DL (ref 70–99)
HCT VFR BLD AUTO: 33.5 % (ref 40–53)
HGB BLD-MCNC: 10.9 G/DL (ref 13.3–17.7)
MCH RBC QN AUTO: 29.6 PG (ref 26.5–33)
MCHC RBC AUTO-ENTMCNC: 32.5 G/DL (ref 31.5–36.5)
MCV RBC AUTO: 91 FL (ref 78–100)
PLATELET # BLD AUTO: 98 10E9/L (ref 150–450)
POTASSIUM SERPL-SCNC: 3.7 MMOL/L (ref 3.4–5.3)
RBC # BLD AUTO: 3.68 10E12/L (ref 4.4–5.9)
SODIUM SERPL-SCNC: 144 MMOL/L (ref 133–144)
VANCOMYCIN SERPL-MCNC: 28.9 MG/L
WBC # BLD AUTO: 6.3 10E9/L (ref 4–11)

## 2018-05-03 PROCEDURE — 85027 COMPLETE CBC AUTOMATED: CPT | Performed by: INTERNAL MEDICINE

## 2018-05-03 PROCEDURE — A9270 NON-COVERED ITEM OR SERVICE: HCPCS | Mod: GY | Performed by: INTERNAL MEDICINE

## 2018-05-03 PROCEDURE — 27210429 ZZH NUTRITION PRODUCT INTERMEDIATE LITER

## 2018-05-03 PROCEDURE — 25000132 ZZH RX MED GY IP 250 OP 250 PS 637: Mod: GY | Performed by: INTERNAL MEDICINE

## 2018-05-03 PROCEDURE — 25000128 H RX IP 250 OP 636: Performed by: INTERNAL MEDICINE

## 2018-05-03 PROCEDURE — 27210995 ZZH RX 272: Performed by: INTERNAL MEDICINE

## 2018-05-03 PROCEDURE — 99232 SBSQ HOSP IP/OBS MODERATE 35: CPT | Performed by: INTERNAL MEDICINE

## 2018-05-03 PROCEDURE — 93005 ELECTROCARDIOGRAM TRACING: CPT

## 2018-05-03 PROCEDURE — 36415 COLL VENOUS BLD VENIPUNCTURE: CPT | Performed by: INTERNAL MEDICINE

## 2018-05-03 PROCEDURE — 12000000 ZZH R&B MED SURG/OB

## 2018-05-03 PROCEDURE — 40000895 ZZH STATISTIC SLP IP EVAL DEFER: Performed by: SPEECH-LANGUAGE PATHOLOGIST

## 2018-05-03 PROCEDURE — 00000146 ZZHCL STATISTIC GLUCOSE BY METER IP

## 2018-05-03 PROCEDURE — 80048 BASIC METABOLIC PNL TOTAL CA: CPT | Performed by: INTERNAL MEDICINE

## 2018-05-03 PROCEDURE — 25000128 H RX IP 250 OP 636: Performed by: NURSE PRACTITIONER

## 2018-05-03 PROCEDURE — 25000128 H RX IP 250 OP 636

## 2018-05-03 PROCEDURE — 93010 ELECTROCARDIOGRAM REPORT: CPT | Performed by: INTERNAL MEDICINE

## 2018-05-03 PROCEDURE — 80202 ASSAY OF VANCOMYCIN: CPT | Performed by: INTERNAL MEDICINE

## 2018-05-03 PROCEDURE — 40000893 ZZH STATISTIC PT IP EVAL DEFER: Performed by: PHYSICAL THERAPIST

## 2018-05-03 RX ADMIN — POTASSIUM & SODIUM PHOSPHATES POWDER PACK 280-160-250 MG 1 PACKET: 280-160-250 PACK at 22:14

## 2018-05-03 RX ADMIN — PANTOPRAZOLE SODIUM 10 MG: 40 TABLET, DELAYED RELEASE ORAL at 22:13

## 2018-05-03 RX ADMIN — CALCIUM CARBONATE 1250 MG: 1250 SUSPENSION ORAL at 22:13

## 2018-05-03 RX ADMIN — NYSTATIN AND TRIAMCINOLONE ACETONIDE: 100000; 1 CREAM TOPICAL at 22:12

## 2018-05-03 RX ADMIN — Medication 1 PACKET: at 09:12

## 2018-05-03 RX ADMIN — LEVOTHYROXINE SODIUM 150 MCG: 150 TABLET ORAL at 09:12

## 2018-05-03 RX ADMIN — CALCIUM CARBONATE 1250 MG: 1250 SUSPENSION ORAL at 16:47

## 2018-05-03 RX ADMIN — POTASSIUM & SODIUM PHOSPHATES POWDER PACK 280-160-250 MG 1 PACKET: 280-160-250 PACK at 16:47

## 2018-05-03 RX ADMIN — BRIVARACETAM 100 MG: 10 SOLUTION ORAL at 22:14

## 2018-05-03 RX ADMIN — HYDROCORTISONE: 1 CREAM TOPICAL at 22:12

## 2018-05-03 RX ADMIN — CARBAMAZEPINE 150 MG: 100 SUSPENSION ORAL at 13:07

## 2018-05-03 RX ADMIN — Medication 2000 UNITS: at 09:13

## 2018-05-03 RX ADMIN — BRIVARACETAM 100 MG: 10 SOLUTION ORAL at 09:12

## 2018-05-03 RX ADMIN — CALCIUM CARBONATE 1250 MG: 1250 SUSPENSION ORAL at 13:06

## 2018-05-03 RX ADMIN — CARBAMAZEPINE 150 MG: 100 SUSPENSION ORAL at 19:10

## 2018-05-03 RX ADMIN — PANTOPRAZOLE SODIUM 10 MG: 40 TABLET, DELAYED RELEASE ORAL at 09:17

## 2018-05-03 RX ADMIN — CARBAMAZEPINE 150 MG: 100 SUSPENSION ORAL at 00:32

## 2018-05-03 RX ADMIN — VANCOMYCIN HYDROCHLORIDE 1500 MG: 5 INJECTION, POWDER, LYOPHILIZED, FOR SOLUTION INTRAVENOUS at 00:32

## 2018-05-03 RX ADMIN — CARBAMAZEPINE 150 MG: 100 SUSPENSION ORAL at 05:37

## 2018-05-03 RX ADMIN — NYSTATIN AND TRIAMCINOLONE ACETONIDE: 100000; 1 CREAM TOPICAL at 09:33

## 2018-05-03 RX ADMIN — SODIUM CHLORIDE, POTASSIUM CHLORIDE, SODIUM LACTATE AND CALCIUM CHLORIDE: 600; 310; 30; 20 INJECTION, SOLUTION INTRAVENOUS at 16:24

## 2018-05-03 RX ADMIN — PIPERACILLIN SODIUM,TAZOBACTAM SODIUM 4.5 G: 4; .5 INJECTION, POWDER, FOR SOLUTION INTRAVENOUS at 22:13

## 2018-05-03 RX ADMIN — PIPERACILLIN SODIUM,TAZOBACTAM SODIUM 4.5 G: 4; .5 INJECTION, POWDER, FOR SOLUTION INTRAVENOUS at 05:00

## 2018-05-03 RX ADMIN — HYDROCORTISONE 10 MG: 10 TABLET ORAL at 22:12

## 2018-05-03 RX ADMIN — PIPERACILLIN SODIUM,TAZOBACTAM SODIUM 4.5 G: 4; .5 INJECTION, POWDER, FOR SOLUTION INTRAVENOUS at 16:47

## 2018-05-03 RX ADMIN — HYDROCORTISONE 20 MG: 10 TABLET ORAL at 09:16

## 2018-05-03 RX ADMIN — PIPERACILLIN SODIUM,TAZOBACTAM SODIUM 4.5 G: 4; .5 INJECTION, POWDER, FOR SOLUTION INTRAVENOUS at 10:56

## 2018-05-03 RX ADMIN — POTASSIUM & SODIUM PHOSPHATES POWDER PACK 280-160-250 MG 1 PACKET: 280-160-250 PACK at 09:12

## 2018-05-03 RX ADMIN — HYDROCORTISONE: 1 CREAM TOPICAL at 09:29

## 2018-05-03 RX ADMIN — ASPIRIN 81 MG 81 MG: 81 TABLET ORAL at 09:11

## 2018-05-03 NOTE — PLAN OF CARE
Problem: Patient Care Overview  Goal: Plan of Care/Patient Progress Review  Outcome: Improving  Pt. Nonverbal and Alert, able to indicate by gestures and sounds of needs. Denies pain. Total care, Ax2, Turn and Repo q2hrs. Lactic acid @ 2125 2.2. Bradycardic at times. Lerma catheter, patent, good urine output. Incontinent of stool, BM x2. Scabs, rash is various areas of body. WOCN consulted for coccyx wound, orders placed, Coccyx blanchable red. Tube feeding @  60cc/hr. Tele NSR. Receiving IV abx and LR @ 125 ml/hr. Continue to monitor.

## 2018-05-03 NOTE — PROGRESS NOTES
House NP note    I was paged by bedside nurse regarding a lactic acid of 2.2.  This is trending down from peak of 3.5 earlier today.  He is on treatment for aspiration pneumonia with Zosyn and vancomycin.  Vital signs were reviewed which aside from bradycardia all are within normal limits.  Patient was briefly evaluated at the bedside he is awake showing thumbs up fist bumping nontoxic appearing.  Unable to definitively ascertain if he is symptomatic from the bradycardia 50 year beats per minute are not because of his baseline mental status he is able to follow commands    Plan  Will forego checking any more LA levels given his stable vitals and clinically well-appearing status.  Can certainly revisit this if there is any change in his condition or vital signs.    Joy Miramontes, CNP  [phone]

## 2018-05-03 NOTE — PROGRESS NOTES
MD Notification    Notified Person: MD    Notified Person Name: Rex MD    Notification Date/Time: 10:19 PM May 2, 2018    Notification Interaction: Paged    Purpose of Notification: Recheck of Lactic Acid, 2.2 @ 2125    Orders Received: Set of VS    Comments:

## 2018-05-03 NOTE — PLAN OF CARE
Problem: Patient Care Overview  Goal: Plan of Care/Patient Progress Review  PT:   Discharge Planner PT   Patient plan for discharge: Family plan is to return to home  Current status: Orders received and chart reviewed. Patient with  h/o TBI, aphasia, dysphagia with g-tube, seizure disorder, panhypopit admitted in Feb with sepsis secondary to right lower lobe pneumonia and in April with sepsis of unclear source. Pt lives at home with mother who provides all his care. Pt dependent for mobility, ADLs and self cares. Per nursing patient presently resisting assistance with cares, demonstrating decreased ability to follow commands and cooperate with cares. Per review of chart history from previous admission patient is at baseline for mobility and ADLs, receiving assistance for all when at home. No further PT/OT needs, PT orders will be completed.   Barriers to return to prior living situation: None, family provides all assistance at baseline  Recommendations for discharge: home with previous support  Rationale for recommendations: Pt is at baseline for functional mobility with cognitive deficits from TBI that limit patient's ability to participate in therapeutic interventions.        Entered by: Joanna Montgomery 05/03/2018 11:51 AM

## 2018-05-03 NOTE — PLAN OF CARE
"Problem: Patient Care Overview  Goal: Plan of Care/Patient Progress Review  Discharge Planner SLP   Patient plan for discharge: Pt unable to state; family not present    Current status: SLP: Chart reviewed and discussed with RN and RD. Pt familiar to this SLP with multiple conversations with pt's mother. During pt's admission in February, mother agreed that PO feedings for comfort were not appropriate due to high risk of aspiration and recurrent pneumonia. Per chart review, pt has since been working with a home health SLP that has trialed PO. SLP recommended an OP video swallow study, however, pt unable to complete due to admission for sepsis from unclear source.     Lengthy discussion with pt's mother over the phone. Per MD note, \"sister asking for video swallow (aparently was scheduled as outpatient) -but again- would hold it at this time given acute illness; would try to do it as outpatient in 1 week after d/c once recovered from current infection.\" Educated mother that performing a video in the inpatient setting with long standing, severe dysphagia and acute infection would not be appropriate and wouldn't represent pt's true swallowing ability. Mother verbalized understanding and agreement. Several questions discussed. Mother stated, \"my long term goal is to keep him out of the hospital.\" Mother instructed to stop all PO trials and strictly target exercise program and oral cares. Outpatient video swallow [place]18 [place] [facility] Mother plans to keep appointment and reported that in the past, pt \"recovers quite quickly\" from pneumonia and believes this will be good timing.     Mother and RN verbalized agreement with recommended: continue strict NPO with oral cares. If pt able to attend, Video swallow study on 5/8/18 at [facility] to determine if pt can return to PO diet.    Barriers to return to prior living situation: None  Recommendations for discharge: Return home with  SLP and STRICT NPO until video " swallow study  Rationale for recommendations: Pt at baseline severe dysphagia with inpatient SLP services not indicated. Will sign off.        Entered by: Ryanne Hutton 05/03/2018 10:34 AM

## 2018-05-03 NOTE — PLAN OF CARE
Problem: Patient Care Overview  Goal: Plan of Care/Patient Progress Review  Outcome: Improving  Afebrile. No signs of pain. Alert and interactive. Aphasic. One loose stool. TF infusing-tolerated. Turned and repositioned q 2hrs. Coccyx is blanchable and red-monitoring. Lerma draining clear yellow urine. Receiving abx and IV fluids.

## 2018-05-03 NOTE — PLAN OF CARE
Problem: Patient Care Overview  Goal: Plan of Care/Patient Progress Review  Discharge Planner OT   Patient plan for discharge: Unknown.  Current status: Order rec'd and chart reviewed. Discussed with PT. Patient is DEP for ADL at baseline.   Barriers to return to prior living situation: Defer to team.  Recommendations for discharge: Defer to team.  Rationale for recommendations: OT eval not indicated as patient is DEP for ADL at baseline, lift dependant for mobility.        Entered by: Megan Wood 05/03/2018 1:31 PM

## 2018-05-03 NOTE — PROGRESS NOTES
[facility]    Hospitalist Progress Note      Assessment & Plan   Keyon Farias with h/o TBI, aphasia, dysphagia with g-tube, seizure disorder, panhypopit admitted in Feb with sepsis secondary to right lower lobe pneumonia and in April with sepsis of unclear source. He lives with his mother who cares for him appeared in  baseline state of health he developed sudden onset of shaking chills and a fever of 102 and cough.     Sepsis with lactic acidosis due to probable aspiration PNA   - presented with fever and cough  - At presentation his T was 102.9,  (sinus tach) /63, O2 96% on RA.  - CXR - showed streaky basilar infiltrates on the lateral film; UA negative for infection (0 WBC), WBC 14, lactic acid 3.3  - He has been working with a speech therapist with trial of small spoon fulls of food, with no evidence of dysphagia or respiratory problems following. He receives TF from 7 AM to 9 PM and mom keeps HOF up 30 degrees. Suspect aspiration occurs with TFs. 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   - Started on vancomycin and zosyn in the ED.  - S/p 30 cc/kg bolus.  - Lactic acid trending down 3.5--2.8; BC 05/01- pending  - only low grade fever today 99.3  - tachycardia improving   - WBC on admission 14-today back to normal  - continue Zosyn/Vanco for now  -discontinue  vanco 5/3  - monitor fever curve and WBCs trend  - Follow strict I and Os.   - change fluids to 75 ml/hr .      Dysphagia, on TF - question recurring aspiration with TF's, leading to recurring presentations for sepsis.   - Nutrition consulted regarding TF's  - HOB up 30 degrees with TF   - Otherwise NPO.   - Hold on further trials with speech therapy at this time given acute infection.  - discussed with  Family, video swallow on Tuesday outpatient       Panhypopituitarism    - Place on stress does steroids with 50 mcg TID IV in the context of sepsis- got 3 doses  - will resume PTA   hydrocortisone 20 mg via GT qam and 10 mg qpm  - Continue levothyroxine.   - Resume q 2 weeks Testosterone injections at discharge.      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      H/o necrotizing pancreatitis  - no acute issues, abdominal exam benign  - lipase 283       GERD  - continue PTA PPI     Chronic pruritis with excoriations over left shin and right arm  - Ordered triamcinolone ointment BID with Eucerin PRN for dry skin.      # Pain Assessment:  Current Pain Score 5/2/2018   Patient currently in pain? denies   Pain score (0-10) -   Pain location -   Pain descriptors -   rFLACC pain score -   CPOT pain score -   Keyon liriano pain level was assessed and he currently denies pain.      DVT Prophylaxis: Enoxaparin (Lovenox) SQ  Code Status: Full Code    Disposition: Expected discharge in couple of days once clinically better.  Called mother and updated  Melisa Ash MD    Interval History   Afebrile, nonverbal but appears to be pleasant,no nursing concerns noted, discussed with  Mother on the phone.she is worried about recurrent aspiration pneumonia, she tries to keep his head up after tube feeds >30 degrees.    -Data reviewed today: I reviewed all new labs and imaging results over the last 24 hours. I personally reviewed the chest x-ray image(s) showing as above.    Physical Exam   Temp: 97.5  F (36.4  C) Temp src: Axillary BP: 120/53   Heart Rate: 55 Resp: 16 SpO2: 98 % O2 Device: None (Room air)    Vitals:    05/01/18 1948 05/01/18 2342 05/03/18 0704   Weight: 74.8 kg (165 lb) 76 kg (167 lb 8.8 oz) 77.3 kg (170 lb 6.7 oz)     Vital Signs with Ranges  Temp:  [97.3  F (36.3  C)-97.5  F (36.4  C)] 97.5  F (36.4  C)  Heart Rate:  [50-71] 55  Resp:  [16] 16  BP: (117-129)/(53-68) 120/53  SpO2:  [96 %-98 %] 98 %  I/O last 3 completed shifts:  In: 3273 [I.V.:1381; NG/GT:950; IV Piggyback:500]  Out: 4300 [Urine:4300]    Constitutional: Awake, alert, NAD  Respiratory: Bilateral air  entry, no wheezing, no rales, no crackles  Cardiovascular: S1S2, RRR, no murmurs, no rubs  GI: abd- soft, nonT, nonD, BS present; GT in place  Skin/Integumen: excoriations noted over left shin and right forearm without erythema or warmth, no bruising or bleeding, no redness, warmth, or swelling and no rashes  Neuro: AA, unable to do complete neuro exam; follows some commands      Medications     IV fluid REPLACEMENT ONLY       lactated ringers 125 mL/hr at 05/02/18 2026       aspirin  81 mg Per J Tube Daily     Brivaracetam  100 mg Oral BID     calcium carbonate  1,250 mg Oral TID     carBAMazepine  150 mg Oral Q6H     enoxaparin  40 mg Subcutaneous Q24H     ergocalciferol  2,000 Units Oral Daily     fiber modular  1 packet Per Feeding Tube TID     hydrocortisone   Topical BID     hydrocortisone  10 mg Oral QPM     hydrocortisone  20 mg Per J Tube QAM     levothyroxine  150 mcg Per J Tube QAM AC     nystatin-triamcinolone   Topical BID     pantoprazole  10 mg Oral BID     piperacillin-tazobactam  4.5 g Intravenous Q6H     potassium & sodium phosphates  1 packet Oral TID     vancomycin (VANCOCIN) IV  1,500 mg Intravenous Q12H       Data     Recent Labs  Lab 05/02/18  0835 05/01/18  2355 05/01/18 2019   WBC 15.6*  --  14.0*   HGB 12.2*  --  14.2   MCV 90  --  89   *  --  123*     --  136   POTASSIUM 4.3  --  3.9   CHLORIDE 110*  --  97   CO2 25  --  30   BUN 15  --  20   CR 0.76  --  0.82   ANIONGAP 6  --  9   STEVE 8.6  --  9.1   *  --  114*   ALBUMIN  --   --  3.7   PROTTOTAL  --   --  9.0*   BILITOTAL  --   --  0.2   ALKPHOS  --   --  112   ALT  --   --  30   AST  --   --  20   LIPASE  --  283  --        No results found for this or any previous visit (from the past 24 hour(s)).

## 2018-05-03 NOTE — PLAN OF CARE
Problem: Patient Care Overview  Goal: Plan of Care/Patient Progress Review  Outcome: No Change  Alert, HECTOR orientation. VSS on RA. Tele Sinus Eliezer. Pt grimaces and becomes agitated when turning for memo care, otherwise happy/calm/cooperative. Coccyx wound improved vs yesterday. incontinent of bowel overnight. Lerma patent, U/O improved in color, clear today. Iv anbx infused, IVF at 125. Pt still NPO. LA remained elevated at 2.2, House NP aware,  recheck this AM. Peg tube Infusing at gaol rate + flushes.  D/C pending, total care and up with lift

## 2018-05-04 LAB
CREAT SERPL-MCNC: 0.96 MG/DL (ref 0.66–1.25)
ERYTHROCYTE [DISTWIDTH] IN BLOOD BY AUTOMATED COUNT: 14.6 % (ref 10–15)
ERYTHROCYTE [DISTWIDTH] IN BLOOD BY AUTOMATED COUNT: NORMAL % (ref 10–15)
GFR SERPL CREATININE-BSD FRML MDRD: 81 ML/MIN/1.7M2
GLUCOSE BLDC GLUCOMTR-MCNC: 144 MG/DL (ref 70–99)
HCT VFR BLD AUTO: 35.8 % (ref 40–53)
HCT VFR BLD AUTO: NORMAL % (ref 40–53)
HGB BLD-MCNC: 11.7 G/DL (ref 13.3–17.7)
HGB BLD-MCNC: NORMAL G/DL (ref 13.3–17.7)
MCH RBC QN AUTO: 29.8 PG (ref 26.5–33)
MCH RBC QN AUTO: NORMAL PG (ref 26.5–33)
MCHC RBC AUTO-ENTMCNC: 32.7 G/DL (ref 31.5–36.5)
MCHC RBC AUTO-ENTMCNC: NORMAL G/DL (ref 31.5–36.5)
MCV RBC AUTO: 91 FL (ref 78–100)
MCV RBC AUTO: NORMAL FL (ref 78–100)
PLATELET # BLD AUTO: 108 10E9/L (ref 150–450)
PLATELET # BLD AUTO: NORMAL 10E9/L (ref 150–450)
RBC # BLD AUTO: 3.93 10E12/L (ref 4.4–5.9)
RBC # BLD AUTO: NORMAL 10E12/L (ref 4.4–5.9)
WBC # BLD AUTO: 7.4 10E9/L (ref 4–11)
WBC # BLD AUTO: NORMAL 10E9/L (ref 4–11)

## 2018-05-04 PROCEDURE — 36415 COLL VENOUS BLD VENIPUNCTURE: CPT | Performed by: INTERNAL MEDICINE

## 2018-05-04 PROCEDURE — 12000000 ZZH R&B MED SURG/OB

## 2018-05-04 PROCEDURE — A9270 NON-COVERED ITEM OR SERVICE: HCPCS | Mod: GY | Performed by: INTERNAL MEDICINE

## 2018-05-04 PROCEDURE — 99232 SBSQ HOSP IP/OBS MODERATE 35: CPT | Performed by: INTERNAL MEDICINE

## 2018-05-04 PROCEDURE — 25000128 H RX IP 250 OP 636: Performed by: INTERNAL MEDICINE

## 2018-05-04 PROCEDURE — 00000146 ZZHCL STATISTIC GLUCOSE BY METER IP

## 2018-05-04 PROCEDURE — 27210429 ZZH NUTRITION PRODUCT INTERMEDIATE LITER

## 2018-05-04 PROCEDURE — 85027 COMPLETE CBC AUTOMATED: CPT | Performed by: INTERNAL MEDICINE

## 2018-05-04 PROCEDURE — 82565 ASSAY OF CREATININE: CPT | Performed by: INTERNAL MEDICINE

## 2018-05-04 PROCEDURE — 25000132 ZZH RX MED GY IP 250 OP 250 PS 637: Mod: GY | Performed by: INTERNAL MEDICINE

## 2018-05-04 PROCEDURE — 27210995 ZZH RX 272: Performed by: INTERNAL MEDICINE

## 2018-05-04 RX ADMIN — POTASSIUM & SODIUM PHOSPHATES POWDER PACK 280-160-250 MG 1 PACKET: 280-160-250 PACK at 17:38

## 2018-05-04 RX ADMIN — BRIVARACETAM 100 MG: 10 SOLUTION ORAL at 08:21

## 2018-05-04 RX ADMIN — POTASSIUM & SODIUM PHOSPHATES POWDER PACK 280-160-250 MG 1 PACKET: 280-160-250 PACK at 21:57

## 2018-05-04 RX ADMIN — CALCIUM CARBONATE 1250 MG: 1250 SUSPENSION ORAL at 21:57

## 2018-05-04 RX ADMIN — CARBAMAZEPINE 150 MG: 100 SUSPENSION ORAL at 00:43

## 2018-05-04 RX ADMIN — Medication 2000 UNITS: at 08:21

## 2018-05-04 RX ADMIN — PIPERACILLIN SODIUM,TAZOBACTAM SODIUM 4.5 G: 4; .5 INJECTION, POWDER, FOR SOLUTION INTRAVENOUS at 03:52

## 2018-05-04 RX ADMIN — PANTOPRAZOLE SODIUM 10 MG: 40 TABLET, DELAYED RELEASE ORAL at 08:21

## 2018-05-04 RX ADMIN — PIPERACILLIN SODIUM,TAZOBACTAM SODIUM 4.5 G: 4; .5 INJECTION, POWDER, FOR SOLUTION INTRAVENOUS at 17:37

## 2018-05-04 RX ADMIN — CALCIUM CARBONATE 1250 MG: 1250 SUSPENSION ORAL at 17:38

## 2018-05-04 RX ADMIN — HYDROCORTISONE 10 MG: 10 TABLET ORAL at 21:56

## 2018-05-04 RX ADMIN — PANTOPRAZOLE SODIUM 10 MG: 40 TABLET, DELAYED RELEASE ORAL at 21:57

## 2018-05-04 RX ADMIN — HYDROCORTISONE: 1 CREAM TOPICAL at 17:38

## 2018-05-04 RX ADMIN — HYDROCORTISONE: 1 CREAM TOPICAL at 08:22

## 2018-05-04 RX ADMIN — LEVOTHYROXINE SODIUM 150 MCG: 150 TABLET ORAL at 08:20

## 2018-05-04 RX ADMIN — BRIVARACETAM 100 MG: 10 SOLUTION ORAL at 21:57

## 2018-05-04 RX ADMIN — CARBAMAZEPINE 150 MG: 100 SUSPENSION ORAL at 06:21

## 2018-05-04 RX ADMIN — NYSTATIN AND TRIAMCINOLONE ACETONIDE: 100000; 1 CREAM TOPICAL at 21:57

## 2018-05-04 RX ADMIN — POTASSIUM & SODIUM PHOSPHATES POWDER PACK 280-160-250 MG 1 PACKET: 280-160-250 PACK at 08:19

## 2018-05-04 RX ADMIN — CALCIUM CARBONATE 1250 MG: 1250 SUSPENSION ORAL at 12:52

## 2018-05-04 RX ADMIN — SODIUM CHLORIDE, POTASSIUM CHLORIDE, SODIUM LACTATE AND CALCIUM CHLORIDE: 600; 310; 30; 20 INJECTION, SOLUTION INTRAVENOUS at 12:51

## 2018-05-04 RX ADMIN — HYDROCORTISONE 20 MG: 10 TABLET ORAL at 08:19

## 2018-05-04 RX ADMIN — ASPIRIN 81 MG 81 MG: 81 TABLET ORAL at 08:20

## 2018-05-04 RX ADMIN — NYSTATIN AND TRIAMCINOLONE ACETONIDE: 100000; 1 CREAM TOPICAL at 08:23

## 2018-05-04 RX ADMIN — PIPERACILLIN SODIUM,TAZOBACTAM SODIUM 4.5 G: 4; .5 INJECTION, POWDER, FOR SOLUTION INTRAVENOUS at 10:06

## 2018-05-04 RX ADMIN — CARBAMAZEPINE 150 MG: 100 SUSPENSION ORAL at 12:52

## 2018-05-04 RX ADMIN — CARBAMAZEPINE 150 MG: 100 SUSPENSION ORAL at 17:38

## 2018-05-04 NOTE — PLAN OF CARE
Problem: Patient Care Overview  Goal: Plan of Care/Patient Progress Review  Outcome: Improving  5427-7307: Pt alert and interactive. HR bradycardic at times; all other VSS on room air. Total assist; turn and reposition q2h. No signs of pain or SOB. Lung sounds diminished. NPO; tube feed running at goal rate of 60; q4h flushes. PEG tube site ANANYA; C/D/I. Lerma patent with adequate output. Incontinent of stool once this shift. Coccyx red/blanchable; ANANYA. IVF running. On IV Zosyn and Vancomycin. Discharge plan pending improvement. Nursing will continue to monitor.

## 2018-05-04 NOTE — PLAN OF CARE
Problem: Patient Care Overview  Goal: Plan of Care/Patient Progress Review  Outcome: Improving  Alert, HECTOR  Orientation as Pt mostly nonverbal at baseline . VSS on RA except tele Sinus Eliezer in 30s and 40s. Afebrile. Infrequent cough. LS diminished and coarse. Strict NPO. Total care, Lift, Turn repo. Coccyx wound pink but blanchable. Lerma patent. IV anbx, IVF@ 125.. TF through PEG tube at goal rate. D/C pending

## 2018-05-04 NOTE — PROGRESS NOTES
Alert, unable to assess orientation. Patient nonverbal. Afebrile, VSS, on room air. Total care, up with lift. Turned and repositioned side to side as indicated. Patient calm and cooperative during all cares except for minor agitation during memo care. Lerma patent, draining clear pale urine. Coccyx wound is red, blanchable, with yellow slough. Rash with scabs on extremities. Wheezing auscultated in anterior JOSE MARIA during morning; lung sounds clear in afternoon. PEG tube intact. Tube feeding running at 60 mL/hr, receiving IV antibiotics and IV fluids.

## 2018-05-04 NOTE — PROGRESS NOTES
[facility]    Hospitalist Progress Note      Assessment & Plan   Keyon Farias with h/o TBI, aphasia, dysphagia with g-tube, seizure disorder, panhypopit admitted in Feb with sepsis secondary to right lower lobe pneumonia and in April with sepsis of unclear source. He lives with his mother who cares for him appeared in  baseline state of health he developed sudden onset of shaking chills and a fever of 102 and cough.     Sepsis with lactic acidosis due to probable aspiration PNA   - presented with fever and cough  - At presentation his T was 102.9,  (sinus tach) /63, O2 96% on RA.  - CXR - showed streaky basilar infiltrates on the lateral film; UA negative for infection (0 WBC), WBC 14, lactic acid 3.3  - He has been working with a speech therapist with trial of small spoon fulls of food, with no evidence of dysphagia or respiratory problems following. He receives TF from 7 AM to 9 PM and mom keeps HOF up 30 degrees. Suspect aspiration occurs with TFs. 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   - Started on vancomycin and zosyn in the ED.  - S/p 30 cc/kg bolus.  - Lactic acid trending down 3.5--2.8--2.2; BC 05/01-NGTD  - WBC on admission 14- then 15.6- then normalized 6.3  - Vanco was d/c on 05/03  - continue Zosyn for now  - afebrile, BP improved, no hypoxic  - stop iv fluids today  - d/c Lerma (placed in ER)     Dysphagia, on TF - question recurring aspiration with TF's, leading to recurring presentations for sepsis.   - Nutrition consulted regarding TF's  - HOB up 30 degrees with TF   - SLP eval appreciated   As per SLP: During pt's admission in February, mother agreed that PO feedings for comfort were not appropriate due to high risk of aspiration and recurrent pneumonia. Per chart review, pt has since been working with a home health SLP that has trialed PO. SLP recommended an OP video swallow study, however, pt unable to complete due to  current admission; mother was asking for video swallowing test while he is here; she was educated that performing a video in the inpatient setting with long standing, severe dysphagia and acute infection would not be appropriate and wouldn't represent pt's true swallowing ability. Mother verbalized understanding and agreement; Mother instructed to stop all PO trials     - discussed with his mother- video swallow scheduled on Tuesday as outpatient; she states that her goal is to keep him out of hospital       Panhypopituitarism    - Placed on stress doses steroids on admission with 50 mcg TID IV in the context of sepsis- got 3 doses  - resumed PTA  hydrocortisone 20 mg via GT qam and 10 mg qpm  - Continue levothyroxine.   - Resume q 2 weeks Testosterone injections at discharge.      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      H/o necrotizing pancreatitis  - no acute issues, abdominal exam benign  - lipase 283       GERD  - continue PTA PPI     Chronic pruritis with excoriations over left shin and right arm  - Ordered triamcinolone ointment BID with Eucerin PRN for dry skin.      # Pain Assessment:  Current Pain Score 5/4/2018   Patient currently in pain? no   Pain score (0-10) -   Pain location -   Pain descriptors -   rFLACC pain score -   CPOT pain score -   Keyon liriano pain level was assessed and he currently denies pain.      DVT Prophylaxis: Enoxaparin (Lovenox) SQ  Code Status: Full Code    Disposition: Anticipate d/c home tomorrow with resuming C    Cele Maldonado MD    Interval History   Afebrile, nonverbal, NAD; discussed with bedside RN and updated the mother over the phone    -Data reviewed today: I reviewed all new labs and imaging results over the last 24 hours. I personally reviewed no images or EKG's today.    Physical Exam   Temp: 97.9  F (36.6  C) Temp src: Axillary BP: 151/74   Heart Rate: 45 Resp: 20 SpO2: 97 % O2 Device: None (Room air)    Vitals:     05/01/18 1948 05/01/18 2342 05/03/18 0704   Weight: 74.8 kg (165 lb) 76 kg (167 lb 8.8 oz) 77.3 kg (170 lb 6.7 oz)     Vital Signs with Ranges  Temp:  [97.4  F (36.3  C)-98  F (36.7  C)] 97.9  F (36.6  C)  Heart Rate:  [41-45] 45  Resp:  [18-20] 20  BP: (127-151)/(62-95) 151/74  SpO2:  [97 %-99 %] 97 %  I/O last 3 completed shifts:  In: 4538 [I.V.:2851; NG/GT:922]  Out: 7375 [Urine:7375]    Constitutional: Awake, alert, NAD  Respiratory: Bilateral air entry, no wheezing, no rales, no crackles  Cardiovascular: S1S2, RRR, no murmurs, no rubs  GI: abd- soft, nonT, nonD, BS present; GT in place  Skin/Integumen: excoriations noted over left shin and right forearm without erythema or warmth, no bruising or bleeding, no redness, warmth, or swelling and no rashes  Neuro: AA, old right side hemiparesis, unable to do complete neuro exam; follows some commands      Medications     IV fluid REPLACEMENT ONLY       lactated ringers 100 mL/hr at 05/04/18 1301       aspirin  81 mg Per J Tube Daily     Brivaracetam  100 mg Oral BID     calcium carbonate  1,250 mg Oral TID     carBAMazepine  150 mg Oral Q6H     enoxaparin  40 mg Subcutaneous Q24H     ergocalciferol  2,000 Units Oral Daily     fiber modular  1 packet Per Feeding Tube TID     hydrocortisone   Topical BID     hydrocortisone  10 mg Oral QPM     hydrocortisone  20 mg Per J Tube QAM     levothyroxine  150 mcg Per J Tube QAM AC     nystatin-triamcinolone   Topical BID     pantoprazole  10 mg Oral BID     piperacillin-tazobactam  4.5 g Intravenous Q6H     potassium & sodium phosphates  1 packet Oral TID       Data     Recent Labs  Lab 05/04/18  1402 05/04/18  0815 05/03/18  0806 05/02/18  0835 05/01/18  2355 05/01/18  2019   WBC 7.4 Canceled, Test credited 6.3 15.6*  --  14.0*   HGB 11.7* Canceled, Test credited 10.9* 12.2*  --  14.2   MCV 91 Canceled, Test credited 91 90  --  89   * Canceled, Test credited 98* 119*  --  123*   NA  --   --  144 141  --  136    POTASSIUM  --   --  3.7 4.3  --  3.9   CHLORIDE  --   --  111* 110*  --  97   CO2  --   --  26 25  --  30   BUN  --   --  13 15  --  20   CR  --  0.96 0.77 0.76  --  0.82   ANIONGAP  --   --  7 6  --  9   STEVE  --   --  8.3* 8.6  --  9.1   GLC  --   --  138* 144*  --  114*   ALBUMIN  --   --   --   --   --  3.7   PROTTOTAL  --   --   --   --   --  9.0*   BILITOTAL  --   --   --   --   --  0.2   ALKPHOS  --   --   --   --   --  112   ALT  --   --   --   --   --  30   AST  --   --   --   --   --  20   LIPASE  --   --   --   --  283  --        No results found for this or any previous visit (from the past 24 hour(s)).

## 2018-05-04 NOTE — PLAN OF CARE
Problem: Patient Care Overview  Goal: Plan of Care/Patient Progress Review  Outcome: Improving  Afebrile, no signs of pain. TF running at 60cc/hr-tolerating. JOSE MARIA expir wheezes. Nonproductive cough. No signs of SOB. Turned and repositioned q 2hrs. Tele SB. No BM this shift.

## 2018-05-04 NOTE — PLAN OF CARE
Problem: Patient Care Overview  Goal: Plan of Care/Patient Progress Review  Outcome: No Change  [id number] Patient Alert, HECTOR orientation, patient non verbal. DBP elevated in 90's, bradycardic other VSS on RA. Lerma patent, adequate OP. Rash on arms and legs, medication administered per MAR. T/R q2h, incontinent of bowels. Coccyx blanchable red. HOB 30 degree. NPO, TF at goal 60, flushes programmed. LR 125ml/h. LS diminished infrequent non productive cough. Tele: SB. Contact precautions maintained.

## 2018-05-04 NOTE — PROGRESS NOTES
Care Transition Initial Assessment - RN  Met with: Patient and Family.  DATA   Active Problems:    Aspiration pneumonia (H)       Cognitive Status: alert and oriented.  Contact information and PCP information verified: Yes  Lives With: parent(s)   Insurance concerns: No Insurance issues identified  ASSESSMENT  Patient currently receives the following services:  Home PCA and RN services  through [facility] [phone].       Identified issues/concerns regarding health management:  Lives with Mother,pt is total care ,receives home care services through [facility]  [phone] .Pleasee call at time of dc. Patient receives TF and meds through [facility]. Mother states patient has enough TF and supplies at home.  When dc mother would prefer wheel chair van transportation. Patient will have his own wheelchair.. Mother anticipates patient returning to his own home with resumption of care. Mother states she is going to reschedule his Video Swallow appointment. Would like to wait till he is stronger.   PLAN  Financial costs for the patient include None  Patient given options and choices for discharge  yes.  Patient/family is agreeable to the plan?  Yes:   Patient anticipates discharging to home with home care .        Patient anticipates needs for home equipment: Yes  Plan/Disposition: Home   Appointments:  Appointment with Dr Gomez   5/9 at 12 noon  Care  (CTS) will continue to follow as needed.

## 2018-05-05 VITALS
TEMPERATURE: 98 F | WEIGHT: 170.64 LBS | SYSTOLIC BLOOD PRESSURE: 112 MMHG | OXYGEN SATURATION: 97 % | HEIGHT: 72 IN | BODY MASS INDEX: 23.11 KG/M2 | DIASTOLIC BLOOD PRESSURE: 69 MMHG | RESPIRATION RATE: 20 BRPM

## 2018-05-05 LAB
CREAT SERPL-MCNC: 0.93 MG/DL (ref 0.66–1.25)
GFR SERPL CREATININE-BSD FRML MDRD: 84 ML/MIN/1.7M2

## 2018-05-05 PROCEDURE — 25000128 H RX IP 250 OP 636: Performed by: INTERNAL MEDICINE

## 2018-05-05 PROCEDURE — 36415 COLL VENOUS BLD VENIPUNCTURE: CPT | Performed by: INTERNAL MEDICINE

## 2018-05-05 PROCEDURE — 82565 ASSAY OF CREATININE: CPT | Performed by: INTERNAL MEDICINE

## 2018-05-05 PROCEDURE — 25000132 ZZH RX MED GY IP 250 OP 250 PS 637: Mod: GY | Performed by: INTERNAL MEDICINE

## 2018-05-05 PROCEDURE — 99239 HOSP IP/OBS DSCHRG MGMT >30: CPT | Performed by: INTERNAL MEDICINE

## 2018-05-05 PROCEDURE — 27210995 ZZH RX 272: Performed by: INTERNAL MEDICINE

## 2018-05-05 PROCEDURE — A9270 NON-COVERED ITEM OR SERVICE: HCPCS | Mod: GY | Performed by: INTERNAL MEDICINE

## 2018-05-05 RX ADMIN — PIPERACILLIN SODIUM,TAZOBACTAM SODIUM 4.5 G: 4; .5 INJECTION, POWDER, FOR SOLUTION INTRAVENOUS at 01:17

## 2018-05-05 RX ADMIN — NYSTATIN AND TRIAMCINOLONE ACETONIDE: 100000; 1 CREAM TOPICAL at 09:36

## 2018-05-05 RX ADMIN — LEVOTHYROXINE SODIUM 150 MCG: 150 TABLET ORAL at 06:45

## 2018-05-05 RX ADMIN — PANTOPRAZOLE SODIUM 10 MG: 40 TABLET, DELAYED RELEASE ORAL at 09:36

## 2018-05-05 RX ADMIN — CARBAMAZEPINE 150 MG: 100 SUSPENSION ORAL at 11:42

## 2018-05-05 RX ADMIN — BRIVARACETAM 100 MG: 10 SOLUTION ORAL at 09:49

## 2018-05-05 RX ADMIN — CARBAMAZEPINE 150 MG: 100 SUSPENSION ORAL at 01:18

## 2018-05-05 RX ADMIN — PIPERACILLIN SODIUM,TAZOBACTAM SODIUM 4.5 G: 4; .5 INJECTION, POWDER, FOR SOLUTION INTRAVENOUS at 11:42

## 2018-05-05 RX ADMIN — HYDROCORTISONE: 1 CREAM TOPICAL at 09:36

## 2018-05-05 RX ADMIN — Medication 2000 UNITS: at 09:36

## 2018-05-05 RX ADMIN — POTASSIUM & SODIUM PHOSPHATES POWDER PACK 280-160-250 MG 1 PACKET: 280-160-250 PACK at 09:36

## 2018-05-05 RX ADMIN — CALCIUM CARBONATE 1250 MG: 1250 SUSPENSION ORAL at 09:36

## 2018-05-05 RX ADMIN — HYDROCORTISONE 20 MG: 10 TABLET ORAL at 09:35

## 2018-05-05 RX ADMIN — PIPERACILLIN SODIUM,TAZOBACTAM SODIUM 4.5 G: 4; .5 INJECTION, POWDER, FOR SOLUTION INTRAVENOUS at 06:45

## 2018-05-05 RX ADMIN — CARBAMAZEPINE 150 MG: 100 SUSPENSION ORAL at 06:45

## 2018-05-05 RX ADMIN — ASPIRIN 81 MG 81 MG: 81 TABLET ORAL at 09:36

## 2018-05-05 NOTE — PROGRESS NOTES
D: SW following for DC planning per protocol. SW reviewed chart.   I: Pt is discharging home to day with mother, as planned. RN CC will resume home care.   P: HE WC ride set for 1245.    GILMER Santamaria, LGSW  [phone]

## 2018-05-05 NOTE — PLAN OF CARE
Problem: Patient Care Overview  Goal: Plan of Care/Patient Progress Review  Outcome: No Change  Alert but aphasic at baseline. Denies pain, n&V. On tube feeding, clamped before d/c. VSS except for little hector. LS with crackles. D/c home via [facility] at 1245. Belongings and medications sent with pt.

## 2018-05-05 NOTE — PLAN OF CARE
Problem: Patient Care Overview  Goal: Plan of Care/Patient Progress Review  Outcome: No Change  Patient is nonverbal. Opens eyes to voice and gentle touch. NPO. Tube feeding in progress at 60 ml/hr and water flushes at 175 ml/hr. Tolerating feed. IV x 2 SL. Incontinent of urine. Turned and repositioned. Slept well. Continues on IV Zosyn. Possibly for discharge today. Will continue to monitor.

## 2018-05-05 NOTE — DISCHARGE SUMMARY
[facility]    Discharge Summary  Hospitalist    Date of Admission:  5/1/2018  Date of Discharge:  5/5/2018 12:52 PM  Discharging Provider: Cele Maldonado  Date of Service (when I saw the patient): 05/05/18    Discharge Diagnoses   Sepsis due to suspected Aspiration Pneumonia  Dysphagia s/p tube feeding  Panhypopituitarism  Seizure disorder  H/o TBI   Spastic hemiplegia- on right side  Aphasia    History of Present Illness   Keyon Farias is an unfortunate 58 y/o male with h/o TBI, aphasia, right sided spastic hemiplegia, dysphagia with G-tube, seizure disorder, panhypopituitarism and multiple admission at [facility] for sepsis due to recurrent PNAs- admitted for evaluation of sudden onset of shaking chills, fever of 102 and cough. He lives with his mother who cares for him; for a detailed HPI- please refer to H&P done by Dr Leticia Santiago on 05/01/2018.       Hospital Course   Keyon Farias was admitted on 5/1/2018.  The following problems were addressed during his hospitalization:    Sepsis with lactic acidosis due to probable aspiration PNA   - had multiple admissions for Pneumonia in the past   - presented with fever and cough  - At presentation his temp was 102.9,  (sinus tach), /63, O2 96% on RA.  - CXR - showed streaky basilar infiltrates on the lateral film; UA negative for infection (0 WBC), WBC 14, lactic acid 3.3  - he has dysphagia, getting tube feedings; he has been working with a speech therapist with trial of small spoon fulls of food, with no evidence of dysphagia or respiratory problems following as per mother. He receives TF from 7 AM to 9 PM and mom keeps HOF up 30 degrees.  - h/o infections with MRSA and VRE  - Started initially on Zosyn/vancomycin; received bolus 30 cc/kg bolus in ER  - Lactic acid trending down 3.5--2.8--2.2; BC 05/01- remained negative; WBC on admission 14- then 15.6- then normalized 6.3  - Vanco was d/c on 05/03 and he was continued on Zosyn while he was  in the hospital  - afebrile, BP improved, no hypoxic  - he will be d/c home on Augmentin BID for 6 more days  - aspiration precautions discussed again with his mother  - will resume HHC services after d/c      Dysphagia, on TF - question recurring aspiration with TF's, leading to recurring presentations for sepsis.   - Nutrition consulted regarding TF's  - HOB up 30 degrees with TF   - SLP eval appreciated   As per SLP: During pt's admission in February, mother agreed that PO feedings for comfort were not appropriate due to high risk of aspiration and recurrent pneumonia. Per chart review, pt has since been working with a home health SLP that has trialed PO. SLP recommended an OP video swallow study, however, pt unable to complete due to current admission; mother was asking for video swallowing test while he is here; she was educated that performing a video in the inpatient setting with long standing, severe dysphagia and acute infection would not be appropriate and wouldn't represent pt's true swallowing ability. Mother verbalized understanding and agreement; Mother instructed to stop all PO trials      - discussed with his mother- video swallow scheduled on Tuesday as outpatient; she states that her goal is to keep him out of hospital .      Panhypopituitarism    - Placed on stress doses steroids on admission in the context of sepsis then resumed PTA  hydrocortisone 20 mg via GT qam and 10 mg qpm  - Continue PTA levothyroxine.   - Resume q 2 weeks Testosterone injections at discharge.       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       H/o necrotizing pancreatitis  - no acute issues, abdominal exam benign  - lipase 283        GERD  - continue PTA PPI      Chronic pruritis with excoriations over left shin and right arm  - Ordered triamcinolone ointment BID with Eucerin PRN for dry skin.         # Discharge Pain Plan:   - Patient currently has NO PAIN and is not being  prescribed pain medications on discharge.    Cele Maldonado MD    Significant Results and Procedures   See below.    Pending Results   These results will be followed up by PMD  Unresulted Labs Ordered in the Past 30 Days of this Admission     Date and Time Order Name Status Description    5/5/2018 0000 Creatinine In process     5/1/2018 2011 Blood culture Preliminary     5/1/2018 2011 Blood culture Preliminary           Code Status   Full Code       Primary Care Physician   Carlos Gomez    Physical Exam   Temp: 98  F (36.7  C) Temp src: Axillary BP: 112/69   Heart Rate: 50 Resp: 20 SpO2: 97 % O2 Device: None (Room air)    Vitals:    05/01/18 2342 05/03/18 0704 05/05/18 0603   Weight: 76 kg (167 lb 8.8 oz) 77.3 kg (170 lb 6.7 oz) 77.4 kg (170 lb 10.2 oz)     Vital Signs with Ranges  Temp:  [97.2  F (36.2  C)-98  F (36.7  C)] 98  F (36.7  C)  Heart Rate:  [43-50] 50  Resp:  [20] 20  BP: (112-151)/(66-74) 112/69  SpO2:  [96 %-97 %] 97 %  I/O last 3 completed shifts:  In: 3339 [I.V.:1701; NG/GT:1270]  Out: 6400 [Urine:6400]    Constitutional:  Awake, alert, NAD, smiling  Respiratory:  Bilateral air entry, no wheezing, no rales, no crackles  Cardiovascular: S1S2, RRR, no murmurs, no rubs  GI: abd- soft, nonT, nonD, BS present; GT in place  Skin/Integumen: excoriations noted over left shin and right forearm without erythema or warmth, no bruising or bleeding, no redness, warmth, or swelling and no rashes  Neuro: AA, old right side hemiparesis, unable to do complete neuro exam; follows some commands      Discharge Disposition   Discharged to home  Condition at discharge: Satisfactory    Consultations This Hospital Stay   PHARMACY TO DOSE VANCO  PHARMACY TO DOSE VANCO  NUTRITION SERVICES ADULT IP CONSULT  WOUND OSTOMY CONTINENCE NURSE  IP CONSULT  PHARMACY IP CONSULT  PHYSICAL THERAPY ADULT IP CONSULT  OCCUPATIONAL THERAPY ADULT IP CONSULT  SPEECH LANGUAGE PATH ADULT IP CONSULT    Time Spent on this Encounter   I,  Cele Maldonado, personally saw the patient today and spent greater than 30 minutes discharging this patient.    Discharge Orders     Home care nursing referral     Follow-up and recommended labs and tests    Appointment with Dr Gomez   5/9 at 12 noon at the [facility]     Reason for your hospital stay   Aspiration Pneumonia     Follow-up and recommended labs and tests    Follow up with primary care provider, Carlos Gomez, within 7 days for hospital follow- up.  No follow up labs or test are needed.    Follow up with Video swallow eval as scheduled.     When to contact your care team   Call your primary doctor or return to ER if you have any of the following: temperature greater than 100.5 or less than 96, chills,  increased shortness of breath, chest pain, lethargy, loss of consciousness, severe diarrhea.     Wound care and dressings   Instructions to care for your wound at home:    - Plan for skin/wound care to coccyx:  BID and prn:  - cleanse with Rita perineal lotion and soft wipes  - apply cortisone cream to reddened area per MD orders  - leave open to air, no dressings at this time  - reposition pt every 1-2 hrs, side to side only  - HOB as low as tolerated     Full Code     Diet   Follow this diet upon discharge: Orders Placed This Encounter     Adult Formula Drip Feeding: Continuous Isosource 1.5; Jejunostomy; Goal Rate: 60; mL/hr; Medication - Tube Feeding Flush Frequency: At least 15-30 mL water before and after medication administration and with tube clogging; Amout to Send (Nutrition...     NPO for Medical/Clinical Reasons Except for: No Exceptions       Discharge Medications   Current Discharge Medication List      START taking these medications    Details   amoxicillin-clavulanate (AUGMENTIN) 875-125 MG per tablet 1 tablet by Per J Tube route 2 times daily  Qty: 12 tablet, Refills: 0    Associated Diagnoses: Aspiration pneumonia, unspecified aspiration pneumonia type, unspecified  laterality, unspecified part of lung (H)         CONTINUE these medications which have NOT CHANGED    Details   ACETAMINOPHEN  mg by Per Feeding Tube route every 4 hours as needed for pain      albuterol (2.5 MG/3ML) 0.083% neb solution Take 1 vial by nebulization every 4 hours as needed for shortness of breath / dyspnea or wheezing      aspirin 10 mg/mL 8.1 mLs (81 mg) by Per J Tube route daily    Associated Diagnoses: Routine adult health maintenance      bacitracin ointment Apply topically daily as needed for wound care To PEG site.      bisacodyl (DULCOLAX) 10 MG Suppository Place 1 suppository (10 mg) rectally daily as needed for constipation  Qty: 30 suppository    Associated Diagnoses: Constipation, unspecified constipation type      Brivaracetam (BRIVIACT) 10 MG/ML solution Take 100 mg by mouth 2 times daily      calcium carbonate 1250 (500 CA) MG/5ML SUSP suspension 5 mLs (1,250 mg) by Per J Tube route 3 times daily (with meals)  Qty: 450 mL    Associated Diagnoses: Malnutrition (H)      carBAMazepine (TEGRETOL) 100 MG/5ML suspension Take 150 mg by mouth every 6 hours      fiber modular (NUTRISOURCE FIBER) packet 1 packet by Per Feeding Tube route 3 times daily    Associated Diagnoses: Necrotizing pancreatitis      !! hydrocortisone (CORTEF) 2 mg/mL 10 mLs (20 mg) by Per J Tube route every morning    Associated Diagnoses: Panhypopituitarism (H)      !! hydrocortisone (CORTEF) 2 mg/mL Take 5 mLs (10 mg) by mouth every evening    Associated Diagnoses: Panhypopituitarism (H)      levothyroxine (SYNTHROID) 25 mcg/mL 6 mLs (150 mcg) by Per J Tube route every morning (before breakfast)    Associated Diagnoses: Panhypopituitarism (H)      melatonin (MELATONIN) 1 MG/ML LIQD liquid 6 mg by Jejunal Tube route nightly as needed for sleep      multivitamins with minerals (CERTAVITE/CEROVITE) LIQD liquid 15 mLs by Per J Tube route daily    Associated Diagnoses: Necrotizing pancreatitis; Malnutrition (H)      !!  pantoprazole (PROTONIX) SUSP suspension Take 10 mg by mouth daily      !! pantoprazole (PROTONIX) SUSP suspension Take 10 mg by mouth every evening      potassium & sodium phosphates (NEUTRA-PHOS) 280-160-250 MG Packet Take 1 packet by mouth 3 times daily 0900, 1500, 2100.       testosterone cypionate (DEPOTESTOTERONE CYPIONATE) 200 MG/ML injection Inject 100 mg into the muscle See Admin Instructions Every 2 weeks.      VITAMIN D, CHOLECALCIFEROL, PO Take 2,000 Units by mouth daily       !! - Potential duplicate medications found. Please discuss with provider.        Allergies   Allergies   Allergen Reactions     Dilantin [Phenytoin Sodium]      Valproic Acid      Toxicity c bone marrow suspension, elevated ammonia levels      Data   Most Recent 3 CBC's:  Recent Labs   Lab Test  05/04/18   1402  05/04/18   0815  05/03/18   0806   WBC  7.4  Canceled, Test credited  6.3   HGB  11.7*  Canceled, Test credited  10.9*   MCV  91  Canceled, Test credited  91   PLT  108*  Canceled, Test credited  98*      Most Recent 3 BMP's:  Recent Labs   Lab Test  05/05/18   1005  05/04/18   0815  05/03/18   0806  05/02/18   0835  05/01/18 2019   NA   --    --   144  141  136   POTASSIUM   --    --   3.7  4.3  3.9   CHLORIDE   --    --   111*  110*  97   CO2   --    --   26  25  30   BUN   --    --   13  15  20   CR  0.93  0.96  0.77  0.76  0.82   ANIONGAP   --    --   7  6  9   STEVE   --    --   8.3*  8.6  9.1   GLC   --    --   138*  144*  114*     Most Recent 2 LFT's:  Recent Labs   Lab Test  05/01/18   2019  03/31/18   2144   AST  20  19   ALT  30  26   ALKPHOS  112  109   BILITOTAL  0.2  0.3     Most Recent INR's and Anticoagulation Dosing History:  Anticoagulation Dose History     Recent Dosing and Labs Latest Ref Rng & Units 12/1/2016 1/22/2017 1/22/2017 1/23/2017 1/25/2017 1/30/2017 2/7/2017    INR 0.86 - 1.14 1.30(H) 2.48(H) 2.58(H) 1.53(H) 1.49(H) 1.32(H) 1.27(H)        Most Recent 3 Troponin's:  Recent Labs   Lab Test   01/22/17   0500  01/21/17   2348  01/21/17   1600   TROPI  0.017  0.025  0.028     Most Recent Cholesterol Panel:  Recent Labs   Lab Test  11/29/16   0430   TRIG  100     Most Recent 6 Bacteria Isolates From Any Culture (See EPIC Reports for Culture Details):  Recent Labs   Lab Test  05/01/18   2111  05/01/18   2054  05/01/18   2019  03/31/18   2304  03/31/18   2241  03/31/18   2144   CULT  No growth after 4 days  No growth  No growth after 4 days  <1000 colonies/mL  urogenital jaime  Susceptibility testing not routinely done    No growth  No growth     Most Recent TSH, T4 and A1c Labs:  Recent Labs   Lab Test  02/15/18   0610  01/29/17   0403  12/01/16   0400   TSH   --    --   <0.01*   T4   --   0.54*  0.73*   A1C  6.6*   --    --      Results for orders placed or performed during the hospital encounter of 05/01/18   XR Chest 2 Views    Narrative    CHEST TWO VIEWS   5/1/2018 8:37 PM     HISTORY: Fever.    COMPARISON: 4/2/2018.      Impression    IMPRESSION: Lung volumes are low. Mild streaky opacities at the lung  bases best seen on the lateral view could represent atelectasis,  although pneumonia is not excluded. Blunting of the costophrenic  angles which could represent pleural thickening or small pleural  effusions.    Stents partially visualized in the right upper quadrant. Presumed  surgical hardware over the cervical spine.    YELENA MAE MD

## 2018-05-05 NOTE — PROGRESS NOTES
Notified [facility] [phone] and faxed requested information to [phone].    1527 refaxed information and discharge summary to [phone].

## 2018-05-05 NOTE — PROGRESS NOTES
Crosscover note   platelet count has been low, fluctuates between 90,000 - 100,000. This evening 108K  Nursing team concerned regarding Lovenox. Will hold off Lovenox today, recheck platelet count a.m.   plan of care discussed with patient's RN.

## 2018-05-05 NOTE — PLAN OF CARE
Problem: Patient Care Overview  Goal: Plan of Care/Patient Progress Review  Pt alert; HECTOR orientation. Nonverbal. Calls out at times. Calm and cooperative, mostly. TF 60ml/hr; Flushes 175ml/4H. LS diminished with exp wheezes in JOSE MARIA. IVF d/c'd. Appears comfortable. T/R q2h. Lerma removed; voiding adequately. Large BM x 1. Incontinent of B and B. Tele SB. Total care. Possible d/c tomorrow. Nursing will continue to monitor.

## 2018-05-08 LAB — INTERPRETATION ECG - MUSE: NORMAL

## 2018-05-14 ENCOUNTER — HOSPITAL ENCOUNTER (OUTPATIENT)
Facility: CLINIC | Age: 58
Discharge: HOME | DRG: 871 | End: 2018-05-14
Attending: RADIOLOGY | Admitting: RADIOLOGY
Payer: MEDICARE

## 2018-05-14 ENCOUNTER — APPOINTMENT (OUTPATIENT)
Dept: INTERVENTIONAL RADIOLOGY/VASCULAR | Facility: CLINIC | Age: 58
DRG: 871 | End: 2018-05-14
Attending: NURSE PRACTITIONER
Payer: MEDICARE

## 2018-05-14 VITALS
SYSTOLIC BLOOD PRESSURE: 108 MMHG | DIASTOLIC BLOOD PRESSURE: 64 MMHG | OXYGEN SATURATION: 100 % | RESPIRATION RATE: 18 BRPM | TEMPERATURE: 96.3 F

## 2018-05-14 DIAGNOSIS — Z51.89 TREATMENT: ICD-10-CM

## 2018-05-14 PROCEDURE — C1769 GUIDE WIRE: HCPCS

## 2018-05-14 PROCEDURE — 27210815 ZZ H TUBE GASTRO CR13

## 2018-05-14 PROCEDURE — 40000854 ZZH STATISTIC SIMPLE TUBE INSERTION/CHARGE, PORT, CATH, FISTULOGRAM

## 2018-05-14 PROCEDURE — 27210905 ZZH KIT CR7

## 2018-05-14 PROCEDURE — 49452 REPLACE G-J TUBE PERC: CPT

## 2018-05-14 NOTE — PROGRESS NOTES
Care Suites Discharge Summary    Discharge Criteria:   Discharge Criteria met per MD orders: Yes. Gtube site dry and intact with new tube at discharge.   Vital signs stable.     Pt demonstrates ability to ambulate safely: Yes.  (See discharge questionnaire for additional information)    Discharge instructions & education:   Discharge instructions reviewed with patient and mother. Patient's Mother verbalizes understanding.   Additional patient education provided:  Gtube exchange    Medications:   Patient will be discharging on new medications- No. Patient verbalizes reason for use, start date, and side effects NA.    Items returned to patient:   Home and hospital acquired medications returned to patient NA   Listed belongings gathered and returned to patient: Yes    Patient discharged to home with mother and RALEXANDER Henao

## 2018-05-14 NOTE — PROGRESS NOTES
Pt here for G tube exchange. Mom and nurse here with pt. States understanding of procedure. Pt alert, aphasic.

## 2018-05-14 NOTE — IR NOTE
Patient tolerated procedure. Dressing applied to site. Patient returned to Care Suites and report given to RN

## 2018-05-14 NOTE — DISCHARGE INSTRUCTIONS
G-tube Exchange Discharge Instructions     After you go home:      You may resume your normal diet    Care of Insertion Site:      For the first 48 hrs, check your puncture site every couple hours while you are awake     You may remove/change the dressing tomorrow    You may shower tomorrow    No tub baths, whirlpools or swimming until your puncture site has fully healed     Activity       You may go back to normal activity in 24 hours    Wait 48 hours before lifting, straining, exercise or other strenuous activity    Medicines:      You may resume all medications    Resume your Warfarin/Coumadin at your regular dose today. Follow up with your provider to have your INR rechecked    Resume your Platelet Inhibitors and Aspirin tomorrow at your regular dose    For minor pain, you may take Acetaminophen (Tylenol) or Ibuprofen (Advil)                 Call the provider who ordered this procedure if:      Blood or fluid is draining from the site    The site is red, swollen, hot, tender or there is foul-smelling drainage    Chills or a fever greater than 101 F (38 C)    Increased pain at the site    Any questions or concerns    Call  911 or go to the Emergency Room if:      Severe pain or trouble breathing    Bleeding that you cannot control      If you have questions call:          [facility] Radiology Dept @ [phone]        The provider who performed your procedure was _________________.        Caring for your G-tube    Tube Maintenance:    Possible problems with your tube may include:      Clogged with medications or feedings - most obstructions can be cleared with a small (3cc) syringe and warm water. This may be repeated until the tube is unclogged. This can be prevented by frequently flushing the tube with water (60cc) during the day and always after medications & feedings.      Tube pulls out or falls out -cover the opening with gauze & tape. Call [phone] for further instructions      Skin breakdown  and/or yeast infections at the insertion site - use of skin barrier ointments and anti-fungal powders can treat most site irritations.  Ask your pharmacist or provider for assistance (a prescription is not necessary).    In general, tube problems (including pulled tubes) are NOT emergency situations. Unless the pulling out of a tube is accompanied by uncontrollable bleeding, please DO NOT GO TO THE EMERGENCY ROOM!  Call [phone] with problems.    Tube Care:      Change the gauze dressing every 24 hours and if soiled (dirty).  Stabilize all tubes securely by using gauze and tape.  Clean tube site with soap & water using a cotton applicator (Q-tip) as needed to prevent irritation.    Flush feeding tube with 60cc of warm or room temperature water before and after meds.  To prevent the tube from clogging, ask your provider or pharmacist if liquid forms of your medications are available. If not, crush the pills well & be sure to flush the tube before & after all medications.    Flush feeding tube a minimum of every 4 hours and when feeding is completed with 60cc of water to keep the tube clear and avoid clogging.    Pt may use an abdominal (waist) binder to protect the G-tube.    If there is continued oozing or bleeding, redness, yellow/green/foul smelling drainage    STOP the feedings & use of the tube immediately if there is:      Continued oozing or bleeding at the site    Redness    Yellow/green or foul smelling drainage at the site    Uncontrolled stomach pain    Many of the supplies mentioned above can be purchased at your local pharmacy      For issues with your tube, please call:    [facility] at [phone]

## 2018-05-14 NOTE — IP AVS SNAPSHOT
[facility] [address]    [place] [address]    Phone:  [phone]                                       After Visit Summary   5/14/2018    Keoyn Farias    MRN: [id number]           After Visit Summary Signature Page     I have received my discharge instructions, and my questions have been answered. I have discussed any challenges I see with this plan with the nurse or doctor.    ..........................................................................................................................................  Patient/Patient Representative Signature      ..........................................................................................................................................  Patient Representative Print Name and Relationship to Patient    ..................................................               ................................................  Date                                            Time    ..........................................................................................................................................  Reviewed by Signature/Title    ...................................................              ..............................................  Date                                                            Time

## 2018-05-14 NOTE — PROCEDURES
RADIOLOGY PROCEDURE NOTE  Patient name: Keyon Farias  MRN: [id number]  : 1960    Pre-procedure diagnosis: GJ tube exchange  Post-procedure diagnosis: Same    Procedure Date/Time: May 14, 2018  2:32 PM  Procedure: Exchange of existing GJ tube.  Good position and ready for use.  No complications.  Tolerated well.  Estimated blood loss: < 5 ml  Specimen(s) collected with description: Existing GJ tube  The patient tolerated the procedure well with no immediate complications.  Significant findings:  Please see above  See imaging dictation for procedural details.    Provider name: Brandon Villafana  Assistant(s):None

## 2018-05-15 ENCOUNTER — APPOINTMENT (OUTPATIENT)
Dept: GENERAL RADIOLOGY | Facility: CLINIC | Age: 58
DRG: 871 | End: 2018-05-15
Attending: EMERGENCY MEDICINE
Payer: MEDICARE

## 2018-05-15 ENCOUNTER — HOSPITAL ENCOUNTER (INPATIENT)
Facility: CLINIC | Age: 58
LOS: 3 days | Discharge: HOME HEALTH | DRG: 871 | End: 2018-05-19
Attending: EMERGENCY MEDICINE | Admitting: INTERNAL MEDICINE
Payer: MEDICARE

## 2018-05-15 DIAGNOSIS — R65.20 SEVERE SEPSIS (H): Primary | ICD-10-CM

## 2018-05-15 DIAGNOSIS — J69.0 ASPIRATION PNEUMONIA, UNSPECIFIED ASPIRATION PNEUMONIA TYPE, UNSPECIFIED LATERALITY, UNSPECIFIED PART OF LUNG (H): ICD-10-CM

## 2018-05-15 DIAGNOSIS — R19.7 DIARRHEA, UNSPECIFIED TYPE: ICD-10-CM

## 2018-05-15 DIAGNOSIS — A41.9 SEVERE SEPSIS (H): Primary | ICD-10-CM

## 2018-05-15 LAB
ALBUMIN UR-MCNC: 10 MG/DL
ANION GAP SERPL CALCULATED.3IONS-SCNC: 10 MMOL/L (ref 3–14)
APPEARANCE UR: CLEAR
BASOPHILS # BLD AUTO: 0.1 10E9/L (ref 0–0.2)
BASOPHILS NFR BLD AUTO: 0.3 %
BILIRUB UR QL STRIP: NEGATIVE
BUN SERPL-MCNC: 20 MG/DL (ref 7–30)
CALCIUM SERPL-MCNC: 8.5 MG/DL (ref 8.5–10.1)
CHLORIDE SERPL-SCNC: 96 MMOL/L (ref 94–109)
CO2 SERPL-SCNC: 26 MMOL/L (ref 20–32)
COLOR UR AUTO: YELLOW
CREAT SERPL-MCNC: 0.8 MG/DL (ref 0.66–1.25)
DIFFERENTIAL METHOD BLD: ABNORMAL
EOSINOPHIL # BLD AUTO: 0.5 10E9/L (ref 0–0.7)
EOSINOPHIL NFR BLD AUTO: 2.9 %
ERYTHROCYTE [DISTWIDTH] IN BLOOD BY AUTOMATED COUNT: 13.8 % (ref 10–15)
GFR SERPL CREATININE-BSD FRML MDRD: >90 ML/MIN/1.7M2
GLUCOSE SERPL-MCNC: 86 MG/DL (ref 70–99)
GLUCOSE UR STRIP-MCNC: NEGATIVE MG/DL
HCT VFR BLD AUTO: 39.3 % (ref 40–53)
HGB BLD-MCNC: 13.8 G/DL (ref 13.3–17.7)
HGB UR QL STRIP: NEGATIVE
HYALINE CASTS #/AREA URNS LPF: 1 /LPF (ref 0–2)
IMM GRANULOCYTES # BLD: 0 10E9/L (ref 0–0.4)
IMM GRANULOCYTES NFR BLD: 0.2 %
INTERPRETATION ECG - MUSE: NORMAL
KETONES UR STRIP-MCNC: NEGATIVE MG/DL
LACTATE BLD-SCNC: 2.5 MMOL/L (ref 0.7–2)
LEUKOCYTE ESTERASE UR QL STRIP: NEGATIVE
LYMPHOCYTES # BLD AUTO: 2.3 10E9/L (ref 0.8–5.3)
LYMPHOCYTES NFR BLD AUTO: 13.9 %
MCH RBC QN AUTO: 30.7 PG (ref 26.5–33)
MCHC RBC AUTO-ENTMCNC: 35.1 G/DL (ref 31.5–36.5)
MCV RBC AUTO: 87 FL (ref 78–100)
MONOCYTES # BLD AUTO: 0.7 10E9/L (ref 0–1.3)
MONOCYTES NFR BLD AUTO: 4.3 %
NEUTROPHILS # BLD AUTO: 13.1 10E9/L (ref 1.6–8.3)
NEUTROPHILS NFR BLD AUTO: 78.4 %
NITRATE UR QL: NEGATIVE
NRBC # BLD AUTO: 0 10*3/UL
NRBC BLD AUTO-RTO: 0 /100
PH UR STRIP: 6.5 PH (ref 5–7)
PLATELET # BLD AUTO: 131 10E9/L (ref 150–450)
POTASSIUM SERPL-SCNC: 4.2 MMOL/L (ref 3.4–5.3)
RBC # BLD AUTO: 4.5 10E12/L (ref 4.4–5.9)
RBC #/AREA URNS AUTO: <1 /HPF (ref 0–2)
SODIUM SERPL-SCNC: 132 MMOL/L (ref 133–144)
SOURCE: ABNORMAL
SP GR UR STRIP: 1.02 (ref 1–1.03)
UROBILINOGEN UR STRIP-MCNC: 2 MG/DL (ref 0–2)
WBC # BLD AUTO: 16.7 10E9/L (ref 4–11)
WBC #/AREA URNS AUTO: <1 /HPF (ref 0–5)

## 2018-05-15 PROCEDURE — 80076 HEPATIC FUNCTION PANEL: CPT | Performed by: EMERGENCY MEDICINE

## 2018-05-15 PROCEDURE — 87040 BLOOD CULTURE FOR BACTERIA: CPT | Performed by: EMERGENCY MEDICINE

## 2018-05-15 PROCEDURE — 84145 PROCALCITONIN (PCT): CPT | Performed by: EMERGENCY MEDICINE

## 2018-05-15 PROCEDURE — 93005 ELECTROCARDIOGRAM TRACING: CPT

## 2018-05-15 PROCEDURE — 81001 URINALYSIS AUTO W/SCOPE: CPT | Performed by: EMERGENCY MEDICINE

## 2018-05-15 PROCEDURE — 87086 URINE CULTURE/COLONY COUNT: CPT | Performed by: EMERGENCY MEDICINE

## 2018-05-15 PROCEDURE — 83605 ASSAY OF LACTIC ACID: CPT | Performed by: EMERGENCY MEDICINE

## 2018-05-15 PROCEDURE — 85025 COMPLETE CBC W/AUTO DIFF WBC: CPT | Performed by: EMERGENCY MEDICINE

## 2018-05-15 PROCEDURE — 99285 EMERGENCY DEPT VISIT HI MDM: CPT | Mod: 25

## 2018-05-15 PROCEDURE — 25000128 H RX IP 250 OP 636: Performed by: EMERGENCY MEDICINE

## 2018-05-15 PROCEDURE — 96361 HYDRATE IV INFUSION ADD-ON: CPT

## 2018-05-15 PROCEDURE — 80048 BASIC METABOLIC PNL TOTAL CA: CPT | Performed by: EMERGENCY MEDICINE

## 2018-05-15 PROCEDURE — 36415 COLL VENOUS BLD VENIPUNCTURE: CPT

## 2018-05-15 PROCEDURE — 71046 X-RAY EXAM CHEST 2 VIEWS: CPT

## 2018-05-15 RX ADMIN — SODIUM CHLORIDE 1000 ML: 9 INJECTION, SOLUTION INTRAVENOUS at 22:40

## 2018-05-15 RX ADMIN — SODIUM CHLORIDE 1000 ML: 9 INJECTION, SOLUTION INTRAVENOUS at 23:50

## 2018-05-15 ASSESSMENT — ENCOUNTER SYMPTOMS: FEVER: 1

## 2018-05-15 NOTE — IP AVS SNAPSHOT
MRN:[id number]                      After Visit Summary   5/15/2018    Keyon Farias    MRN: [id number]           Thank you!     Thank you for choosing [facility] for your care. Our goal is always to provide you with excellent care. Hearing back from our patients is one way we can continue to improve our services. Please take a few minutes to complete the written survey that you may receive in the mail after you visit with us. Thank you!        Patient Information     Date Of Birth          1960        Designated Caregiver       Most Recent Value    Caregiver    Will someone help with your care after discharge? yes    Name of designated caregiver Savannah Farias [mother]    Phone number of caregiver [phone]    Caregiver address same as pt      About your hospital stay     You were admitted on:  May 16, 2018 You last received care in the:  [place] [address] [facility]    You were discharged on:  May 19, 2018        Reason for your hospital stay       Aspiration PNA                  Who to Call     For medical emergencies, please call 911.  For non-urgent questions about your medical care, please call your primary care provider or clinic, [phone]          Attending Provider     Provider Specialty    Too Rodarte MD Emergency Medicine    Christianson, Israel Lubin MD Internal Medicine       Primary Care Provider Office Phone # Fax #    Carlos Gomez [phone]      After Care Instructions     Activity       Your activity upon discharge: activity as tolerated            Diet       Follow this diet upon discharge: Orders Placed This Encounter      Adult Formula Drip Feeding: Continuous Isosource 1.5; Jejunostomy; Goal Rate: 60 ml/hr; mL/hr; Medication - Tube Feeding Flush Frequency: At least 15-30 mL water before and after medication administration and with tube clogging; resuming home tube...      NPO for Medical/Clinical Reasons Except for: No Exceptions                   Follow-up Appointments     Follow-up and recommended labs and tests        Follow up with primary care provider, Carlos Gomez, within 7 days for hospital follow- up.  The following labs/tests are recommended: CBC/BMP.  CXR in 4 weeks to document clearance  FU with Surgery at [facility] for possible colonoscopy and removal of the stent                  Additional Services     Home Care OT Referral for Hospital Discharge       OT to [facility] and treat    Your provider has ordered home care - occupational therapy. If you have not been contacted within 2 days of your discharge please call the department phone number listed on the top of this document.            Home Care PT Referral for Hospital Discharge       PT to [facility] and treat    Your provider has ordered home care - physical therapy. If you have not been contacted within 2 days of your discharge please call the department phone number listed on the top of this document.            Home Care SLP Referral for Hospital Discharge       SLP to [facility] and treat    Your provider has ordered home care - speech therapy. If you have not been contacted within 2 days of your discharge please call the department phone number listed on the top of this document.            Home care nursing referral       RN skilled nursing visit. RN to assess vital signs and weight, respiratory and cardiac status and hydration, nutrition and bowel status.  RN to teach tube feedings and oral hygiene , aspiration precautions .  RN to provide tube site care and management.    Your provider has ordered home care nursing services. If you have not been contacted within 2 days of your discharge please call the inpatient department phone number at [phone] .                  Pending Results     Date and Time Order Name Status Description    5/15/2018 2211 Blood culture Preliminary     5/15/2018 2211 Blood culture Preliminary             Statement of Approval     Ordered          05/19/18 1012  I have  "reviewed and agree with all the recommendations and orders detailed in this document.  EFFECTIVE NOW     Approved and electronically signed by:  Pamela Soliz MD             Admission Information     Date & Time Provider Department Dept. Phone    5/15/2018 Israel Christianson MD [place] [address] [facility] [phone]      Your Vitals Were     Blood Pressure Pulse Temperature Respirations Weight Pulse Oximetry    102/59 (BP Location: Left arm) 111 97.6  F (36.4  C) (Oral) 16 71.8 kg (158 lb 3.2 oz) 96%    BMI (Body Mass Index)                   21.46 kg/m2           MyChart Information     [employer] lets you send messages to your doctor, view your test results, renew your prescriptions, schedule appointments and more. To sign up, go to [email or web address] . Click on \"Log in\" on the left side of the screen, which will take you to the Welcome page. Then click on \"Sign up Now\" on the right side of the page.     You will be asked to enter the access code listed below, as well as some personal information. Please follow the directions to create your username and password.     Your access code is: [email or web address]  Expires: 2018  2:31 PM     Your access code will  in 90 days. If you need help or a new code, please call your [place] clinic or [phone].        Care EveryWhere ID     This is your Care EveryWhere ID. This could be used by other organizations to access your [place] medical records  [id number]        Equal Access to Services     [facility] : Hadii aad ku hadasho Soomaali, waaxda luqadaha, qaybta kaalmada adeegyada, yaw moreland . So [facility] [phone].    ATENCIÓN: Si habla español, tiene a king disposición servicios gratuitos de asistencia lingüística. Llame al [phone].    We comply with applicable federal civil rights laws and [place] laws. We do not discriminate on the basis of race, color, national origin, age, disability, sex, sexual " orientation, or gender identity.               Review of your medicines      START taking        Dose / Directions    amoxicillin-clavulanate 400-57 MG/5ML suspension   Commonly known as:  AUGMENTIN   Indication:  Pneumonia caused by Inhaling a Substance Into the Lungs   Used for:  Aspiration pneumonia, unspecified aspiration pneumonia type, unspecified laterality, unspecified part of lung (H)   Replaces:  amoxicillin-clavulanate 875-125 MG per tablet        Dose:  500 mg   6.3 mLs (500 mg) by Per Feeding Tube route every 8 hours for 5 days   Quantity:  94.5 mL   Refills:  0         CONTINUE these medicines which may have CHANGED, or have new prescriptions. If we are uncertain of the size of tablets/capsules you have at home, strength may be listed as something that might have changed.        Dose / Directions    levothyroxine 25 mcg/mL Susp   Commonly known as:  SYNTHROID   This may have changed:  additional instructions   Used for:  Panhypopituitarism (H)        Dose:  150 mcg   6 mLs (150 mcg) by Per J Tube route every morning (before breakfast)   Refills:  0         CONTINUE these medicines which have NOT CHANGED        Dose / Directions    ACETAMINOPHEN PO        Dose:  650 mg   650 mg by Per Feeding Tube route every 4 hours as needed for pain   Refills:  0       albuterol (2.5 MG/3ML) 0.083% neb solution        Dose:  1 vial   Take 1 vial by nebulization every 4 hours as needed for shortness of breath / dyspnea or wheezing   Refills:  0       aspirin 10 mg/mL Susp   Used for:  Routine adult health maintenance        Dose:  81 mg   8.1 mLs (81 mg) by Per J Tube route daily   Refills:  0       bacitracin ointment        Apply topically daily as needed for wound care To PEG site.   Refills:  0       bisacodyl 10 MG Suppository   Commonly known as:  DULCOLAX   Used for:  Constipation, unspecified constipation type        Dose:  10 mg   Place 1 suppository (10 mg) rectally daily as needed for constipation    Quantity:  30 suppository   Refills:  0       BRIVIACT 10 MG/ML solution   Generic drug:  Brivaracetam        Dose:  100 mg   Take 100 mg by mouth 2 times daily   Refills:  0       calcium carbonate 1250 (500 Ca) MG/5ML Susp suspension   Used for:  Malnutrition (H)        Dose:  1250 mg   5 mLs (1,250 mg) by Per J Tube route 3 times daily (with meals)   Quantity:  450 mL   Refills:  0       carBAMazepine 100 MG/5ML suspension   Commonly known as:  TEGretol        Dose:  150 mg   Take 150 mg by mouth every 6 hours   Refills:  0       fiber modular packet   Used for:  Necrotizing pancreatitis        Dose:  1 packet   1 packet by Per Feeding Tube route 3 times daily   Refills:  0       * hydrocortisone 2 mg/mL Susp   Commonly known as:  CORTEF   Used for:  Panhypopituitarism (H)        Dose:  20 mg   10 mLs (20 mg) by Per J Tube route every morning   Refills:  0       * hydrocortisone 2 mg/mL Susp   Commonly known as:  CORTEF   Used for:  Panhypopituitarism (H)        Dose:  10 mg   Take 5 mLs (10 mg) by mouth every evening   Refills:  0       melatonin 1 MG/ML Liqd liquid        Dose:  6 mg   6 mg by Jejunal Tube route nightly as needed for sleep   Refills:  0       multivitamins with minerals Liqd liquid   Used for:  Necrotizing pancreatitis, Malnutrition (H)        Dose:  15 mL   15 mLs by Per J Tube route daily   Refills:  0       * pantoprazole Susp suspension   Commonly known as:  PROTONIX        Dose:  10 mg   Take 10 mg by mouth daily   Refills:  0       * pantoprazole Susp suspension   Commonly known as:  PROTONIX        Dose:  10 mg   Take 10 mg by mouth every evening   Refills:  0       potassium & sodium phosphates 280-160-250 MG Packet   Commonly known as:  NEUTRA-PHOS        Dose:  1 packet   Take 1 packet by mouth 3 times daily 0900, 1500, 2100.   Refills:  0       testosterone cypionate 200 MG/ML injection   Commonly known as:  DEPOTESTOTERONE        Dose:  100 mg   Inject 100 mg into the muscle See  Admin Instructions Every 2 weeks.   Refills:  0       VITAMIN D (CHOLECALCIFEROL) PO        Dose:  4000 Units   Take 4,000 Units by mouth   Refills:  0       * Notice:  This list has 4 medication(s) that are the same as other medications prescribed for you. Read the directions carefully, and ask your doctor or other care provider to review them with you.      STOP taking     amoxicillin-clavulanate 875-125 MG per tablet   Commonly known as:  AUGMENTIN   Replaced by:  amoxicillin-clavulanate 400-57 MG/5ML suspension                Where to get your medicines      These medications were sent to [facility] [place], [place] - [address]  [address], [place] [address]     Phone:  [phone]     amoxicillin-clavulanate 400-57 MG/5ML suspension                Protect others around you: Learn how to safely use, store and throw away your medicines at www.disposemymeds.org.        ANTIBIOTIC INSTRUCTION     You've Been Prescribed an Antibiotic - Now What?  Your healthcare team thinks that you or your loved one might have an infection. Some infections can be treated with antibiotics, which are powerful, life-saving drugs. Like all medications, antibiotics have side effects and should only be used when necessary. There are some important things you should know about your antibiotic treatment.      Your healthcare team may run tests before you start taking an antibiotic.    Your team may take samples (e.g., from your blood, urine or other areas) to run tests to look for bacteria. These test can be important to determine if you need an antibiotic at all and, if you do, which antibiotic will work best.      Within a few days, your healthcare team might change or even stop your antibiotic.    Your team may start you on an antibiotic while they are working to find out what is making you sick.    Your team might change your antibiotic because test results show that a different antibiotic would be  better to treat your infection.    In some cases, once your team has more information, they learn that you do not need an antibiotic at all. They may find out that you don't have an infection, or that the antibiotic you're taking won't work against your infection. For example, an infection caused by a virus can't be treated with antibiotics. Staying on an antibiotic when you don't need it is more likely to be harmful than helpful.      You may experience side effects from your antibiotic.    Like all medications, antibiotics have side effects. Some of these can be serious.    Let you healthcare team know if you have any known allergies when you are admitted to the hospital.    One significant side effect of nearly all antibiotics is the risk of severe and sometimes deadly diarrhea caused by Clostridium difficile (C. Difficile). This occurs when a person takes antibiotics because some good germs are destroyed. Antibiotic use allows C. diificile to take over, putting patients at high risk for this serious infection.    As a patient or caregiver, it is important to understand your or your loved one's antibiotic treatment. It is especially important for caregivers to speak up when patients can't speak for themselves. Here are some important questions to ask your healthcare team.    What infection is this antibiotic treating and how do you know I have that infection?    What side effects might occur from this antibiotic?    How long will I need to take this antibiotic?    Is it safe to take this antibiotic with other medications or supplements (e.g., vitamins) that I am taking?     Are there any special directions I need to know about taking this antibiotic? For example, should I take it with food?    How will I be monitored to know whether my infection is responding to the antibiotic?    What tests may help to make sure the right antibiotic is prescribed for me?      Information provided by:  www.cdc.gov/getsmart  U.S.  Department of Health and Human Services  Centers for disease Control and Prevention  National Center for Emerging and Zoonotic Infectious Diseases  Division of Healthcare Quality Promotion             Medication List: This is a list of all your medications and when to take them. Check marks below indicate your daily home schedule. Keep this list as a reference.      Medications           Morning Afternoon Evening Bedtime As Needed    ACETAMINOPHEN PO   650 mg by Per Feeding Tube route every 4 hours as needed for pain                                albuterol (2.5 MG/3ML) 0.083% neb solution   Take 1 vial by nebulization every 4 hours as needed for shortness of breath / dyspnea or wheezing                                amoxicillin-clavulanate 400-57 MG/5ML suspension   Commonly known as:  AUGMENTIN   6.3 mLs (500 mg) by Per Feeding Tube route every 8 hours for 5 days   Last time this was given:  500 mg on 5/19/2018  8:05 AM                                aspirin 10 mg/mL Susp   8.1 mLs (81 mg) by Per J Tube route daily                                bacitracin ointment   Apply topically daily as needed for wound care To PEG site.                                bisacodyl 10 MG Suppository   Commonly known as:  DULCOLAX   Place 1 suppository (10 mg) rectally daily as needed for constipation                                BRIVIACT 10 MG/ML solution   Take 100 mg by mouth 2 times daily   Last time this was given:  100 mg on 5/19/2018  9:39 AM   Generic drug:  Brivaracetam                                calcium carbonate 1250 (500 Ca) MG/5ML Susp suspension   5 mLs (1,250 mg) by Per J Tube route 3 times daily (with meals)                                carBAMazepine 100 MG/5ML suspension   Commonly known as:  TEGretol   Take 150 mg by mouth every 6 hours   Last time this was given:  150 mg on 5/19/2018  3:36 AM                                fiber modular packet   1 packet by Per Feeding Tube route 3 times daily   Last  time this was given:  1 packet on 5/18/2018  9:47 PM                                * hydrocortisone 2 mg/mL Susp   Commonly known as:  CORTEF   10 mLs (20 mg) by Per J Tube route every morning                                * hydrocortisone 2 mg/mL Susp   Commonly known as:  CORTEF   Take 5 mLs (10 mg) by mouth every evening                                levothyroxine 25 mcg/mL Susp   Commonly known as:  SYNTHROID   6 mLs (150 mcg) by Per J Tube route every morning (before breakfast)                                melatonin 1 MG/ML Liqd liquid   6 mg by Jejunal Tube route nightly as needed for sleep                                multivitamins with minerals Liqd liquid   15 mLs by Per J Tube route daily   Last time this was given:  15 mLs on 5/19/2018  8:05 AM                                * pantoprazole Susp suspension   Commonly known as:  PROTONIX   Take 10 mg by mouth daily   Last time this was given:  10 mg on 5/19/2018  8:05 AM                                * pantoprazole Susp suspension   Commonly known as:  PROTONIX   Take 10 mg by mouth every evening   Last time this was given:  10 mg on 5/19/2018  8:05 AM                                potassium & sodium phosphates 280-160-250 MG Packet   Commonly known as:  NEUTRA-PHOS   Take 1 packet by mouth 3 times daily 0900, 1500, 2100.   Last time this was given:  1 packet on 5/19/2018  8:04 AM                                testosterone cypionate 200 MG/ML injection   Commonly known as:  DEPOTESTOTERONE   Inject 100 mg into the muscle See Admin Instructions Every 2 weeks.                                VITAMIN D (CHOLECALCIFEROL) PO   Take 4,000 Units by mouth                                * Notice:  This list has 4 medication(s) that are the same as other medications prescribed for you. Read the directions carefully, and ask your doctor or other care provider to review them with you.

## 2018-05-15 NOTE — IP AVS SNAPSHOT
[address], [address]    [facility] [address]    Phone:  [phone]                                       After Visit Summary   5/15/2018    Keyon Farias    MRN: [id number]           After Visit Summary Signature Page     I have received my discharge instructions, and my questions have been answered. I have discussed any challenges I see with this plan with the nurse or doctor.    ..........................................................................................................................................  Patient/Patient Representative Signature      ..........................................................................................................................................  Patient Representative Print Name and Relationship to Patient    ..................................................               ................................................  Date                                            Time    ..........................................................................................................................................  Reviewed by Signature/Title    ...................................................              ..............................................  Date                                                            Time

## 2018-05-16 ENCOUNTER — APPOINTMENT (OUTPATIENT)
Dept: CT IMAGING | Facility: CLINIC | Age: 58
DRG: 871 | End: 2018-05-16
Attending: INTERNAL MEDICINE
Payer: MEDICARE

## 2018-05-16 LAB
ALBUMIN SERPL-MCNC: 3.5 G/DL (ref 3.4–5)
ALP SERPL-CCNC: 107 U/L (ref 40–150)
ALT SERPL W P-5'-P-CCNC: 26 U/L (ref 0–70)
ANION GAP SERPL CALCULATED.3IONS-SCNC: 5 MMOL/L (ref 3–14)
AST SERPL W P-5'-P-CCNC: 22 U/L (ref 0–45)
BASOPHILS # BLD AUTO: 0 10E9/L (ref 0–0.2)
BASOPHILS NFR BLD AUTO: 0.2 %
BILIRUB DIRECT SERPL-MCNC: 0.1 MG/DL (ref 0–0.2)
BILIRUB SERPL-MCNC: 0.2 MG/DL (ref 0.2–1.3)
BUN SERPL-MCNC: 14 MG/DL (ref 7–30)
CALCIUM SERPL-MCNC: 8.2 MG/DL (ref 8.5–10.1)
CHLORIDE SERPL-SCNC: 105 MMOL/L (ref 94–109)
CO2 SERPL-SCNC: 27 MMOL/L (ref 20–32)
CREAT SERPL-MCNC: 0.84 MG/DL (ref 0.66–1.25)
DIFFERENTIAL METHOD BLD: ABNORMAL
EOSINOPHIL # BLD AUTO: 0.3 10E9/L (ref 0–0.7)
EOSINOPHIL NFR BLD AUTO: 1.8 %
ERYTHROCYTE [DISTWIDTH] IN BLOOD BY AUTOMATED COUNT: 13.9 % (ref 10–15)
GFR SERPL CREATININE-BSD FRML MDRD: >90 ML/MIN/1.7M2
GLUCOSE SERPL-MCNC: 99 MG/DL (ref 70–99)
HCT VFR BLD AUTO: 35.4 % (ref 40–53)
HGB BLD-MCNC: 11.8 G/DL (ref 13.3–17.7)
IMM GRANULOCYTES # BLD: 0.1 10E9/L (ref 0–0.4)
IMM GRANULOCYTES NFR BLD: 0.4 %
LACTATE BLD-SCNC: 2.3 MMOL/L (ref 0.7–2)
LACTATE BLD-SCNC: 2.9 MMOL/L (ref 0.7–2)
LACTATE SERPL-SCNC: 1.7 MMOL/L (ref 0.4–2)
LACTATE SERPL-SCNC: 2.6 MMOL/L (ref 0.4–2)
LIPASE SERPL-CCNC: 289 U/L (ref 73–393)
LYMPHOCYTES # BLD AUTO: 1.8 10E9/L (ref 0.8–5.3)
LYMPHOCYTES NFR BLD AUTO: 9.6 %
MCH RBC QN AUTO: 29.6 PG (ref 26.5–33)
MCHC RBC AUTO-ENTMCNC: 33.3 G/DL (ref 31.5–36.5)
MCV RBC AUTO: 89 FL (ref 78–100)
MONOCYTES # BLD AUTO: 0.7 10E9/L (ref 0–1.3)
MONOCYTES NFR BLD AUTO: 3.6 %
NEUTROPHILS # BLD AUTO: 15.4 10E9/L (ref 1.6–8.3)
NEUTROPHILS NFR BLD AUTO: 84.4 %
NRBC # BLD AUTO: 0 10*3/UL
NRBC BLD AUTO-RTO: 0 /100
PLATELET # BLD AUTO: 104 10E9/L (ref 150–450)
POTASSIUM SERPL-SCNC: 4.5 MMOL/L (ref 3.4–5.3)
PROCALCITONIN SERPL-MCNC: 0.05 NG/ML
PROT SERPL-MCNC: 8.1 G/DL (ref 6.8–8.8)
RBC # BLD AUTO: 3.99 10E12/L (ref 4.4–5.9)
SODIUM SERPL-SCNC: 137 MMOL/L (ref 133–144)
WBC # BLD AUTO: 18.3 10E9/L (ref 4–11)

## 2018-05-16 PROCEDURE — 40000893 ZZH STATISTIC PT IP EVAL DEFER: Performed by: PHYSICAL THERAPIST

## 2018-05-16 PROCEDURE — A9270 NON-COVERED ITEM OR SERVICE: HCPCS | Mod: GY | Performed by: INTERNAL MEDICINE

## 2018-05-16 PROCEDURE — 36415 COLL VENOUS BLD VENIPUNCTURE: CPT | Performed by: INTERNAL MEDICINE

## 2018-05-16 PROCEDURE — 27210995 ZZH RX 272: Performed by: INTERNAL MEDICINE

## 2018-05-16 PROCEDURE — 25000128 H RX IP 250 OP 636: Performed by: INTERNAL MEDICINE

## 2018-05-16 PROCEDURE — 83605 ASSAY OF LACTIC ACID: CPT | Performed by: INTERNAL MEDICINE

## 2018-05-16 PROCEDURE — 12000000 ZZH R&B MED SURG/OB

## 2018-05-16 PROCEDURE — 96365 THER/PROPH/DIAG IV INF INIT: CPT

## 2018-05-16 PROCEDURE — 36415 COLL VENOUS BLD VENIPUNCTURE: CPT | Performed by: EMERGENCY MEDICINE

## 2018-05-16 PROCEDURE — 25000132 ZZH RX MED GY IP 250 OP 250 PS 637: Mod: GY | Performed by: INTERNAL MEDICINE

## 2018-05-16 PROCEDURE — 85025 COMPLETE CBC W/AUTO DIFF WBC: CPT | Performed by: INTERNAL MEDICINE

## 2018-05-16 PROCEDURE — 25000125 ZZHC RX 250: Performed by: INTERNAL MEDICINE

## 2018-05-16 PROCEDURE — 25000132 ZZH RX MED GY IP 250 OP 250 PS 637: Performed by: EMERGENCY MEDICINE

## 2018-05-16 PROCEDURE — 83690 ASSAY OF LIPASE: CPT | Performed by: INTERNAL MEDICINE

## 2018-05-16 PROCEDURE — 25000128 H RX IP 250 OP 636: Performed by: EMERGENCY MEDICINE

## 2018-05-16 PROCEDURE — 74177 CT ABD & PELVIS W/CONTRAST: CPT

## 2018-05-16 PROCEDURE — 83605 ASSAY OF LACTIC ACID: CPT | Performed by: EMERGENCY MEDICINE

## 2018-05-16 PROCEDURE — 99223 1ST HOSP IP/OBS HIGH 75: CPT | Mod: AI | Performed by: INTERNAL MEDICINE

## 2018-05-16 PROCEDURE — 96361 HYDRATE IV INFUSION ADD-ON: CPT

## 2018-05-16 PROCEDURE — 80048 BASIC METABOLIC PNL TOTAL CA: CPT | Performed by: INTERNAL MEDICINE

## 2018-05-16 RX ORDER — POTASSIUM CHLORIDE 1500 MG/1
20-40 TABLET, EXTENDED RELEASE ORAL
Status: DISCONTINUED | OUTPATIENT
Start: 2018-05-16 | End: 2018-05-19 | Stop reason: HOSPADM

## 2018-05-16 RX ORDER — ACETAMINOPHEN 325 MG/1
650 TABLET ORAL EVERY 4 HOURS PRN
Status: DISCONTINUED | OUTPATIENT
Start: 2018-05-16 | End: 2018-05-19 | Stop reason: HOSPADM

## 2018-05-16 RX ORDER — TRIAMCINOLONE ACETONIDE 5 MG/G
OINTMENT TOPICAL 2 TIMES DAILY
Status: DISCONTINUED | OUTPATIENT
Start: 2018-05-16 | End: 2018-05-16

## 2018-05-16 RX ORDER — POLYETHYLENE GLYCOL 3350 17 G/17G
17 POWDER, FOR SOLUTION ORAL DAILY PRN
Status: DISCONTINUED | OUTPATIENT
Start: 2018-05-16 | End: 2018-05-19 | Stop reason: HOSPADM

## 2018-05-16 RX ORDER — ONDANSETRON 2 MG/ML
4 INJECTION INTRAMUSCULAR; INTRAVENOUS EVERY 6 HOURS PRN
Status: DISCONTINUED | OUTPATIENT
Start: 2018-05-16 | End: 2018-05-19 | Stop reason: HOSPADM

## 2018-05-16 RX ORDER — POTASSIUM CHLORIDE 29.8 MG/ML
20 INJECTION INTRAVENOUS
Status: DISCONTINUED | OUTPATIENT
Start: 2018-05-16 | End: 2018-05-19 | Stop reason: HOSPADM

## 2018-05-16 RX ORDER — POTASSIUM CHLORIDE 7.45 MG/ML
10 INJECTION INTRAVENOUS
Status: DISCONTINUED | OUTPATIENT
Start: 2018-05-16 | End: 2018-05-19 | Stop reason: HOSPADM

## 2018-05-16 RX ORDER — HEPARIN SODIUM 5000 [USP'U]/.5ML
5000 INJECTION, SOLUTION INTRAVENOUS; SUBCUTANEOUS EVERY 12 HOURS
Status: DISCONTINUED | OUTPATIENT
Start: 2018-05-16 | End: 2018-05-19 | Stop reason: HOSPADM

## 2018-05-16 RX ORDER — OXYCODONE AND ACETAMINOPHEN 5; 325 MG/1; MG/1
1-2 TABLET ORAL EVERY 4 HOURS PRN
Status: DISCONTINUED | OUTPATIENT
Start: 2018-05-16 | End: 2018-05-19 | Stop reason: HOSPADM

## 2018-05-16 RX ORDER — IOPAMIDOL 755 MG/ML
85 INJECTION, SOLUTION INTRAVASCULAR ONCE
Status: COMPLETED | OUTPATIENT
Start: 2018-05-16 | End: 2018-05-16

## 2018-05-16 RX ORDER — BISACODYL 10 MG
10 SUPPOSITORY, RECTAL RECTAL DAILY PRN
Status: DISCONTINUED | OUTPATIENT
Start: 2018-05-16 | End: 2018-05-19 | Stop reason: HOSPADM

## 2018-05-16 RX ORDER — POTASSIUM CL/LIDO/0.9 % NACL 10MEQ/0.1L
10 INTRAVENOUS SOLUTION, PIGGYBACK (ML) INTRAVENOUS
Status: DISCONTINUED | OUTPATIENT
Start: 2018-05-16 | End: 2018-05-19 | Stop reason: HOSPADM

## 2018-05-16 RX ORDER — LEVOTHYROXINE SODIUM 150 UG/1
150 TABLET ORAL
Status: DISCONTINUED | OUTPATIENT
Start: 2018-05-16 | End: 2018-05-19 | Stop reason: HOSPADM

## 2018-05-16 RX ORDER — SODIUM CHLORIDE 9 MG/ML
INJECTION, SOLUTION INTRAVENOUS CONTINUOUS
Status: DISCONTINUED | OUTPATIENT
Start: 2018-05-16 | End: 2018-05-17 | Stop reason: ALTCHOICE

## 2018-05-16 RX ORDER — POTASSIUM CHLORIDE 1.5 G/1.58G
20-40 POWDER, FOR SOLUTION ORAL
Status: DISCONTINUED | OUTPATIENT
Start: 2018-05-16 | End: 2018-05-19 | Stop reason: HOSPADM

## 2018-05-16 RX ORDER — BISACODYL 10 MG
10 SUPPOSITORY, RECTAL RECTAL DAILY PRN
Status: DISCONTINUED | OUTPATIENT
Start: 2018-05-16 | End: 2018-05-16

## 2018-05-16 RX ORDER — LIDOCAINE 40 MG/G
CREAM TOPICAL
Status: DISCONTINUED | OUTPATIENT
Start: 2018-05-16 | End: 2018-05-19 | Stop reason: HOSPADM

## 2018-05-16 RX ORDER — ONDANSETRON 4 MG/1
4 TABLET, ORALLY DISINTEGRATING ORAL EVERY 6 HOURS PRN
Status: DISCONTINUED | OUTPATIENT
Start: 2018-05-16 | End: 2018-05-19 | Stop reason: HOSPADM

## 2018-05-16 RX ORDER — ALBUTEROL SULFATE 0.83 MG/ML
1 SOLUTION RESPIRATORY (INHALATION) EVERY 4 HOURS PRN
Status: DISCONTINUED | OUTPATIENT
Start: 2018-05-16 | End: 2018-05-19 | Stop reason: HOSPADM

## 2018-05-16 RX ORDER — GUAR GUM
1 PACKET (EA) ORAL 3 TIMES DAILY
Status: DISCONTINUED | OUTPATIENT
Start: 2018-05-16 | End: 2018-05-19 | Stop reason: HOSPADM

## 2018-05-16 RX ORDER — TRIAMCINOLONE ACETONIDE 5 MG/G
CREAM TOPICAL 2 TIMES DAILY
Status: DISCONTINUED | OUTPATIENT
Start: 2018-05-16 | End: 2018-05-19 | Stop reason: HOSPADM

## 2018-05-16 RX ORDER — NALOXONE HYDROCHLORIDE 0.4 MG/ML
.1-.4 INJECTION, SOLUTION INTRAMUSCULAR; INTRAVENOUS; SUBCUTANEOUS
Status: DISCONTINUED | OUTPATIENT
Start: 2018-05-16 | End: 2018-05-19 | Stop reason: HOSPADM

## 2018-05-16 RX ORDER — PIPERACILLIN SODIUM, TAZOBACTAM SODIUM 4; .5 G/20ML; G/20ML
4.5 INJECTION, POWDER, LYOPHILIZED, FOR SOLUTION INTRAVENOUS EVERY 6 HOURS
Status: DISCONTINUED | OUTPATIENT
Start: 2018-05-16 | End: 2018-05-17

## 2018-05-16 RX ORDER — PIPERACILLIN SODIUM, TAZOBACTAM SODIUM 3; .375 G/15ML; G/15ML
3.38 INJECTION, POWDER, LYOPHILIZED, FOR SOLUTION INTRAVENOUS EVERY 6 HOURS
Status: DISCONTINUED | OUTPATIENT
Start: 2018-05-16 | End: 2018-05-16 | Stop reason: DRUGHIGH

## 2018-05-16 RX ORDER — CARBAMAZEPINE 100 MG/5ML
150 SUSPENSION ORAL EVERY 6 HOURS
Status: DISCONTINUED | OUTPATIENT
Start: 2018-05-16 | End: 2018-05-19 | Stop reason: HOSPADM

## 2018-05-16 RX ORDER — MAGNESIUM SULFATE HEPTAHYDRATE 40 MG/ML
4 INJECTION, SOLUTION INTRAVENOUS EVERY 4 HOURS PRN
Status: DISCONTINUED | OUTPATIENT
Start: 2018-05-16 | End: 2018-05-19 | Stop reason: HOSPADM

## 2018-05-16 RX ORDER — PIPERACILLIN SODIUM, TAZOBACTAM SODIUM 3; .375 G/15ML; G/15ML
3.38 INJECTION, POWDER, LYOPHILIZED, FOR SOLUTION INTRAVENOUS ONCE
Status: COMPLETED | OUTPATIENT
Start: 2018-05-16 | End: 2018-05-16

## 2018-05-16 RX ORDER — ASPIRIN 81 MG/1
81 TABLET, CHEWABLE ORAL DAILY
Status: DISCONTINUED | OUTPATIENT
Start: 2018-05-16 | End: 2018-05-19 | Stop reason: HOSPADM

## 2018-05-16 RX ORDER — CARBAMAZEPINE 100 MG/5ML
150 SUSPENSION ORAL ONCE
Status: DISCONTINUED | OUTPATIENT
Start: 2018-05-16 | End: 2018-05-19 | Stop reason: HOSPADM

## 2018-05-16 RX ADMIN — SODIUM CHLORIDE 500 ML: 9 INJECTION, SOLUTION INTRAVENOUS at 12:44

## 2018-05-16 RX ADMIN — SODIUM CHLORIDE 500 ML: 9 INJECTION, SOLUTION INTRAVENOUS at 04:51

## 2018-05-16 RX ADMIN — MULTIVITAMIN 15 ML: LIQUID ORAL at 09:03

## 2018-05-16 RX ADMIN — PANTOPRAZOLE SODIUM 10 MG: 40 TABLET, DELAYED RELEASE ORAL at 20:38

## 2018-05-16 RX ADMIN — HEPARIN SODIUM 5000 UNITS: 5000 INJECTION, SOLUTION INTRAVENOUS; SUBCUTANEOUS at 17:29

## 2018-05-16 RX ADMIN — CARBAMAZEPINE 150 MG: 100 SUSPENSION ORAL at 04:00

## 2018-05-16 RX ADMIN — PIPERACILLIN SODIUM,TAZOBACTAM SODIUM 4.5 G: 4; .5 INJECTION, POWDER, FOR SOLUTION INTRAVENOUS at 20:38

## 2018-05-16 RX ADMIN — PIPERACILLIN SODIUM,TAZOBACTAM SODIUM 3.38 G: 3; .375 INJECTION, POWDER, FOR SOLUTION INTRAVENOUS at 02:37

## 2018-05-16 RX ADMIN — PIPERACILLIN SODIUM,TAZOBACTAM SODIUM 3.38 G: 3; .375 INJECTION, POWDER, FOR SOLUTION INTRAVENOUS at 08:53

## 2018-05-16 RX ADMIN — Medication 1 PACKET: at 09:16

## 2018-05-16 RX ADMIN — LEVOTHYROXINE SODIUM 150 MCG: 150 TABLET ORAL at 06:51

## 2018-05-16 RX ADMIN — Medication 125 MG: at 09:03

## 2018-05-16 RX ADMIN — BRIVARACETAM 100 MG: 10 SOLUTION ORAL at 22:03

## 2018-05-16 RX ADMIN — PANTOPRAZOLE SODIUM 10 MG: 40 TABLET, DELAYED RELEASE ORAL at 09:16

## 2018-05-16 RX ADMIN — CARBAMAZEPINE 150 MG: 100 SUSPENSION ORAL at 16:04

## 2018-05-16 RX ADMIN — POTASSIUM & SODIUM PHOSPHATES POWDER PACK 280-160-250 MG 1 PACKET: 280-160-250 PACK at 21:01

## 2018-05-16 RX ADMIN — SODIUM CHLORIDE: 9 INJECTION, SOLUTION INTRAVENOUS at 15:06

## 2018-05-16 RX ADMIN — PIPERACILLIN SODIUM,TAZOBACTAM SODIUM 4.5 G: 4; .5 INJECTION, POWDER, FOR SOLUTION INTRAVENOUS at 15:06

## 2018-05-16 RX ADMIN — VANCOMYCIN 125 MG: KIT at 02:00

## 2018-05-16 RX ADMIN — HYDROCORTISONE SODIUM SUCCINATE 50 MG: 100 INJECTION, POWDER, FOR SOLUTION INTRAMUSCULAR; INTRAVENOUS at 21:01

## 2018-05-16 RX ADMIN — SODIUM CHLORIDE: 9 INJECTION, SOLUTION INTRAVENOUS at 04:53

## 2018-05-16 RX ADMIN — CARBAMAZEPINE 150 MG: 100 SUSPENSION ORAL at 21:31

## 2018-05-16 RX ADMIN — IOPAMIDOL 85 ML: 755 INJECTION, SOLUTION INTRAVENOUS at 12:13

## 2018-05-16 RX ADMIN — SODIUM CHLORIDE 66 ML: 9 INJECTION, SOLUTION INTRAVENOUS at 12:14

## 2018-05-16 RX ADMIN — TRIAMCINOLONE ACETONIDE: 5 CREAM TOPICAL at 20:59

## 2018-05-16 RX ADMIN — SODIUM CHLORIDE, POTASSIUM CHLORIDE, SODIUM LACTATE AND CALCIUM CHLORIDE 1000 ML: 600; 310; 30; 20 INJECTION, SOLUTION INTRAVENOUS at 00:58

## 2018-05-16 RX ADMIN — TRIAMCINOLONE ACETONIDE: 5 CREAM TOPICAL at 09:03

## 2018-05-16 RX ADMIN — Medication 1 PACKET: at 16:04

## 2018-05-16 RX ADMIN — BRIVARACETAM 100 MG: 10 SOLUTION ORAL at 11:04

## 2018-05-16 RX ADMIN — CARBAMAZEPINE 150 MG: 100 SUSPENSION ORAL at 09:03

## 2018-05-16 RX ADMIN — SODIUM CHLORIDE: 9 INJECTION, SOLUTION INTRAVENOUS at 09:02

## 2018-05-16 RX ADMIN — POTASSIUM & SODIUM PHOSPHATES POWDER PACK 280-160-250 MG 1 PACKET: 280-160-250 PACK at 15:06

## 2018-05-16 RX ADMIN — HYDROCORTISONE SODIUM SUCCINATE 50 MG: 100 INJECTION, POWDER, FOR SOLUTION INTRAMUSCULAR; INTRAVENOUS at 05:03

## 2018-05-16 RX ADMIN — HEPARIN SODIUM 5000 UNITS: 5000 INJECTION, SOLUTION INTRAVENOUS; SUBCUTANEOUS at 05:03

## 2018-05-16 RX ADMIN — ASPIRIN 81 MG 81 MG: 81 TABLET ORAL at 08:52

## 2018-05-16 RX ADMIN — SODIUM CHLORIDE: 9 INJECTION, SOLUTION INTRAVENOUS at 23:50

## 2018-05-16 RX ADMIN — POTASSIUM & SODIUM PHOSPHATES POWDER PACK 280-160-250 MG 1 PACKET: 280-160-250 PACK at 08:53

## 2018-05-16 RX ADMIN — HYDROCORTISONE SODIUM SUCCINATE 50 MG: 100 INJECTION, POWDER, FOR SOLUTION INTRAMUSCULAR; INTRAVENOUS at 15:06

## 2018-05-16 NOTE — PHARMACY-ADMISSION MEDICATION HISTORY
Admission medication history interview status for the 5/15/2018  admission is complete. See EPIC admission navigator for prior to admission medications     Medication history source reliability:Good    Actions taken by pharmacist (provider contacted, etc):called his mom     Additional medication history information not noted on PTA med list :None    Medication reconciliation/reorder completed by provider prior to medication history? No    Time spent in this activity: 25min    Prior to Admission medications    Medication Sig Last Dose Taking? Auth Provider   ACETAMINOPHEN  mg by Per Feeding Tube route every 4 hours as needed for pain 5/15/2018 at Unknown time Yes Unknown, Entered By History   albuterol (2.5 MG/3ML) 0.083% neb solution Take 1 vial by nebulization every 4 hours as needed for shortness of breath / dyspnea or wheezing 5/15/2018 at Unknown time Yes Unknown, Entered By History   amoxicillin-clavulanate (AUGMENTIN) 875-125 MG per tablet 1 tablet by Per J Tube route 2 times daily 5/15/2018 at Unknown time Yes Cele Maldonado MD   aspirin 10 mg/mL 8.1 mLs (81 mg) by Per J Tube route daily 5/15/2018 at Unknown time Yes Mariana Venegas MD   bacitracin ointment Apply topically daily as needed for wound care To PEG site.  Yes Unknown, Entered By History   bisacodyl (DULCOLAX) 10 MG Suppository Place 1 suppository (10 mg) rectally daily as needed for constipation  Yes Mariana Venegas MD   Brivaracetam (BRIVIACT) 10 MG/ML solution Take 100 mg by mouth 2 times daily 5/15/2018 at Unknown time Yes Reported, Patient   calcium carbonate 1250 (500 CA) MG/5ML SUSP suspension 5 mLs (1,250 mg) by Per J Tube route 3 times daily (with meals) 5/15/2018 at Unknown time Yes Mariana Venegas MD   carBAMazepine (TEGRETOL) 100 MG/5ML suspension Take 150 mg by mouth every 6 hours 5/15/2018 at Unknown time Yes Unknown, Entered By History   fiber modular (NUTRISOURCE FIBER) packet 1 packet by Per  Feeding Tube route 3 times daily 5/15/2018 at Unknown time Yes Mariana Venegas MD   hydrocortisone (CORTEF) 2 mg/mL 10 mLs (20 mg) by Per J Tube route every morning 5/15/2018 at Unknown time Yes Mariana Venegas MD   hydrocortisone (CORTEF) 2 mg/mL Take 5 mLs (10 mg) by mouth every evening 5/15/2018 at Unknown time Yes Mariana Venegas MD   levothyroxine (SYNTHROID) 25 mcg/mL 6 mLs (150 mcg) by Per J Tube route every morning (before breakfast)  Patient taking differently: 150 mcg by Per J Tube route every morning (before breakfast) Hold tube feeding 1 hour prior and 1 hour after medication administered. 5/15/2018 at Unknown time Yes Mariana Venegas MD   melatonin (MELATONIN) 1 MG/ML LIQD liquid 6 mg by Jejunal Tube route nightly as needed for sleep  Yes Unknown, Entered By History   multivitamins with minerals (CERTAVITE/CEROVITE) LIQD liquid 15 mLs by Per J Tube route daily 5/15/2018 at Unknown time Yes Mariana Venegas MD   pantoprazole (PROTONIX) SUSP suspension Take 10 mg by mouth daily 5/15/2018 at Unknown time Yes Unknown, Entered By History   pantoprazole (PROTONIX) SUSP suspension Take 10 mg by mouth every evening 5/15/2018 at Unknown time Yes Unknown, Entered By History   potassium & sodium phosphates (NEUTRA-PHOS) 280-160-250 MG Packet Take 1 packet by mouth 3 times daily 0900, 1500, 2100.  5/15/2018 at Unknown time Yes Unknown, Entered By History   testosterone cypionate (DEPOTESTOTERONE CYPIONATE) 200 MG/ML injection Inject 100 mg into the muscle See Admin Instructions Every 2 weeks. 5/14/2018 Yes Unknown, Entered By History   VITAMIN D, CHOLECALCIFEROL, PO Take 4,000 Units by mouth  5/15/2018 at Unknown time Yes Unknown, Entered By History

## 2018-05-16 NOTE — PLAN OF CARE
Problem: Patient Care Overview  Goal: Plan of Care/Patient Progress Review  Outcome: No Change  ED admit overnight.  HE[facility] orientation, pt non verbal; per mother understands yes and no questions and will respond w/thumbs up or thumbs down. VSS, no s/sx of pain, history of stroke and TBI, has R sided hemiplegia. LS diminished, non productive cough, unable to get sputum.  Got 500 mL NS bolus, now NS infusing at 100 mL/hr. NPO, has GJ tube, dressing is CDI. Need to r/o c-diff, has not had any diarrhea since arriving to the floor so unable to get sample. Incont of urine mult times and pt likes to do his own wipes and have cares explained prior to doing them, turn and repo q 2 hours; coccyx red, but blanchable. IV ABX.

## 2018-05-16 NOTE — PROGRESS NOTES
[facility]    Hospitalist Progress Note    Date of Service (when I saw the patient): 05/16/2018     Assessment & Plan    Keyon Farias is a 57 year old male with a past medical hx of TBI, aphasia, dysphagia with g-tube, seizure disorder, panhypopituitarism and recurrent admissions with sepsis and aspiration PNA. He was admitted in Feb with sepsis secondary to right lower lobe pneumonia and in April with sepsis of unclear source. His most recent admission was earlier this month from 5/1-5/5 with sepsis 2/2 Asp Pna. He was discharged home on 5/5 with 6 days if Augmentin. He lives with his mother who cares for him and had been doing well per her report up until today when he developed sudden onset of shaking chills, fever of 102.      Sepsis  Unclear source at this time. He is at risk for recurrent aspiration PNA but other than maybe a mild cough he is not hypoxic nor does he appear to have any resp complaints. He has not coughed once on my interview and his lung exam was unimpressive. His CXR shows a possible new mid LLL infiltrate vs atelectasis but his procal is normal indicating this is not a bacterial PNA if a PNA at all. He did report loose stools on Abx but the last one was Saturday as far as his mother can tell. He also did have his G-tube replaced by IR yesterday so a possible leak could be the source but unclear. His LA was 2.5 on admit and 2.9 s/p 3 L of fluid.    - For now I will cover him for possible PNA although not clear by CXR   - He had some diarrhea PTA and was started on PO vancomycin for possible c.diff, but now no BM's so hold on further PO vancomycin. Collect and send stool sample, and resume if positive.      - H/o VRE and MRSA. Hodl on IV Vanco for now but reconsider if clinical picture does not improve.     - CT scan of the chest / abdomen / pelvis given recurrent presentations of sepsis of unclear etiology.   -  Obtain stool culture if able.    - IVF, increased to 125 cc/h  given hypotension at admission with lactic acid elevation.    - BP stable, mental status at baseline, very interactive so repeat lactic acid in AM.    - Follow wbc and fever curve, and cultures.    - No sputum production for sputum sample.      Dysphagia, on TF   Question recurring aspiration with TF's, leading to recurring presentations for sepsis. G-Tube replaced 5/15/18.   - HOB up 30 degrees with TF   - Otherwise NPO.  - Underwent a recent video swallow after his recent admission and continued NPO was advised.    - Hold on further trials with speech therapy at this time.        Panhypopituitarism    - Placed on stress does steroids with Solu-cortef 50 mcg TID IV in the setting of sepsis.   - Can likely resume his PTA hydrocortisone in the next 48-72 hours if things improve.   - Continue levothyroxine.   - Resume q 2 weeks Testosterone injections at discharge.       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       H/o necrotizing pancreatitis  Abdominal exam benign so low suspicion this is a source.   - Nl lipase. CT as above.      Chronic pruritis   with excoriations over left shin and right arm  - Ordered triamcinolone ointment BID     DVT Prophylaxis: Heparin SQ  Code Status: Full Code was discussed.      Disposition:  Pending clinical coarse.     # Pain Assessment:  Current Pain Score 5/16/2018   Patient currently in pain? denies   Pain score (0-10) -   Pain location -   Pain descriptors -   rFLACC pain score -   CPOT pain score -   Keyon liriano pain level was assessed and he currently denies pain.      Communication: Discussed with RN on 05/16/18      Leticia Santiago  [phone] (p)  Text Page (7AM - 6PM)     Interval History   Patient is no longer having fevers, smiling very interactive. BP and HR normalized.  No BM, no sputum production.     -Data reviewed today: I reviewed all new labs and imaging results over the last 24 hours. I personally reviewed no images or EKG's  today.    Physical Exam   Temp: 98.1  F (36.7  C) Temp src: Oral BP: 108/53 Pulse: 111 Heart Rate: 73 Resp: 16 SpO2: 95 % O2 Device: None (Room air)    There were no vitals filed for this visit.  Vital Signs with Ranges  Temp:  [98.1  F (36.7  C)-101.3  F (38.5  C)] 98.1  F (36.7  C)  Pulse:  [111] 111  Heart Rate:  [] 73  Resp:  [15-23] 16  BP: ()/(44-66) 108/53  SpO2:  [91 %-100 %] 95 %  I/O last 3 completed shifts:  In: 682 [I.V.:562; NG/GT:120]  Out: -     Constitutional:  NAD,   Neuropsyche:  Alert, smiling and laughing, interactive, due to baseline cognitive impairment, not able to answer questions appropriately.   Respiratory:  Breathing comfortably, good air exchange, no wheezes, no crackles.   Cardiovascular:  Regular rate and rhythm, no edema.   GI:  soft, NT/ND, G-tube site looks good, BS normal  Skin/Integumen:  No acute rash or sign of bleeding.     Medications   All medications reviewed on 05/16/18      sodium chloride 100 mL/hr at 05/16/18 0902       aspirin  81 mg Per J Tube Daily     Brivaracetam  100 mg Oral BID     carBAMazepine  150 mg Oral Once     carBAMazepine  150 mg Oral Q6H     fiber modular  1 packet Per Feeding Tube TID     heparin  5,000 Units Subcutaneous Q12H     hydrocortisone sodium succinate PF  50 mg Intravenous Q8H     levothyroxine  150 mcg Per J Tube QAM AC     multivitamins with minerals  15 mL Per J Tube Daily     pantoprazole  10 mg Oral Daily     pantoprazole  10 mg Oral QPM     piperacillin-tazobactam  3.375 g Intravenous Q6H     potassium & sodium phosphates  1 packet Oral TID     sodium chloride (PF)  3 mL Intracatheter Q8H     sodium chloride (PF)  3 mL Intracatheter Q8H     triamcinolone   Topical BID     vancomycin  125 mg Per G Tube 4x Daily       Data     Recent Labs  Lab 05/16/18  0655 05/15/18  2237   WBC 18.3* 16.7*   HGB 11.8* 13.8   MCV 89 87   * 131*    132*   POTASSIUM 4.5 4.2   CHLORIDE 105 96   CO2 27 26   BUN 14 20   CR 0.84 0.80    ANIONGAP 5 10   STEVE 8.2* 8.5   GLC 99 86   ALBUMIN  --  3.5   PROTTOTAL  --  8.1   BILITOTAL  --  0.2   ALKPHOS  --  107   ALT  --  26   AST  --  22   LIPASE 289  --        Recent Results (from the past 24 hour(s))   XR Chest 2 Views    Narrative    XR CHEST 2 VW  5/15/2018 10:59 PM     INDICATION: Fever.    COMPARISON: 5/1/2018.      Impression    IMPRESSION: Shallow inspiration. New mild left lower lung atelectasis  or infiltrate. Otherwise no significant change. Normal heart size.  Postoperative changes cervical spine.    PANFILO KING MD

## 2018-05-16 NOTE — PROGRESS NOTES
RECEIVING UNIT ED HANDOFF REVIEW    ED Nurse Handoff Report was reviewed by: Bonny Draper on May 16, 2018 at 2:27 AM

## 2018-05-16 NOTE — H&P
[facility]    History and Physical  Hospitalist       Date of Admission:  5/15/2018  Date of Service (when I saw the patient): 05/16/18    Assessment & Plan   Keyon Farias is a 57 year old male with a past medical hx of TBI, aphasia, dysphagia with g-tube, seizure disorder, panhypopituitarism and recurrent admissions with sepsis and aspiration PNA. He was admitted in Feb with sepsis secondary to right lower lobe pneumonia and in April with sepsis of unclear source. His most recent admission was earlier this month from 5/1-5/5 with sepsis 2/2 Asp Pna. He was discharged home on 5/5 with 6 days if Augmentin. He lives with his mother who cares for him and had been doing well per her report up until today when he developed sudden onset of shaking chills, fever of 102.     Sepsis  Unclear source at this time. He is at risk for recurrent aspiration PNA but other than maybe a mild cough he is not hypoxic nor does he appear to have any resp complaints. He has not coughed once on my interview and his lung exam was unimpressive. His CXR shows a possible new mid LLL infiltrate vs atelectasis but his procal is normal indicating this is not a bacterial PNA if a PNA at all. He did report loose stools on Abx but the last one was Saturday as far as his mother can tell. He also did have his G-tube replaced by IR yesterday so a possible leak could be the source but unclear. His LA was 2.5 on admit and 2.9 s/p 3 L of fluid.    - For now I will cover him for possible PNA and possible C-Diff although I am not entirely convinced of either.    - Start Zosyn and po Vanco. Will avoid dbl gram neg coverage for HCAP for now.   - H/o VRE and MRSA. Hodl on IV Vanco for now but reconsider if clinical picture does not improve.     - I will get a CT scan of his C/A/P to get a better look at his lung and to eval his abd. He denies any abd pain.    - Send stool for c-diff. Sputum cx if able.      - IVF   - Follow cx's   - Repeat LA  in 4 hours.    - Follow wbc and fever curve.     Dysphagia, on TF   Question recurring aspiration with TF's, leading to recurring presentations for sepsis. G-Tube replaced 5/15/18.   - HOB up 30 degrees with TF   - Otherwise NPO.  - Underwent a recent video swallow after his recent admission and continued NPO was advised.    - Hold on further trials with speech therapy at this time.       Panhypopituitarism    - Placed on stress does steroids with Solu-cortef 50 mcg TID IV in the setting of sepsis.   - Can likely resume his PTA hydrocortisone in the next 48-72 hours if things improve.   - Continue levothyroxine.   - Resume q 2 weeks Testosterone injections at discharge.      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      H/o necrotizing pancreatitis  Abdominal exam benign so low suspicion this is a source.    - Lipase pending.    - CT as above.     Chronic pruritis   with excoriations over left shin and right arm  - Ordered triamcinolone ointment BID    DVT Prophylaxis: Heparin SQ  Code Status: Full Code was discussed.     Disposition:  Pending clinical coarse.       Israel Christianson       Primary Care Physician   *Carlos Gomez    Chief Complaint   Fever    History is obtained from the electronic health record and patient's mother as he is non-verbal.     History of Present Illness    Keyon Farias is a 57 year old male with a past medical hx of TBI, aphasia, dysphagia with g-tube, seizure disorder, panhypopituitarism and recurrent admissions with sepsis and aspiration PNA. He was admitted in Feb with sepsis secondary to right lower lobe pneumonia and in April with sepsis of unclear source. His most recent admission was earlier this month from 5/1-5/5 with sepsis 2/2 Asp Pna. He was discharged home on 5/5 with 6 days if Augmentin. He lives with his mother who cares for him and had been doing well per her report up until today when he developed sudden onset of shaking  "chills, fever of 102 and making a \"funny noise\" with his mouth as he does when he does not feel good. This sounds fairly similar to how he presented earlier this month. His mother reports a mild cough but no SOB. There were also reports of some \"loose stools\" when he was on his Abx but his last loose BM was reported on Saturday.  He denies any dysuria, abd pain, nausea, vomiting, chest pain, sob, HA, runny nose, sore throat.  He does have a rash on his LLE and RUE but this is stable and nothing new as he has had this for months. He did have his gastric tube replaced yesterday in IR and this has been working well.   Initial vital signs on presentation to the emergency room showed a blood pressure 105/58 with the lowest blood pressure being recorded at 81/60 and a pulse of 124.  His temperature was 101.3 with room air saturations of 95% and respiratory rate of 17.  Labs are notable for sodium of 132 but otherwise an unremarkable BMP and liver profile.  His lactic acid is elevated at 2.5 and mildly up to 2.9 despite fluid resuscitation.  His pro calcitonin interestingly is negative at 0.05.  Glucose is 86, white blood cell count 16.7, platelets 131 and he is a mild left shift.  His urinalysis is negative.  Chest x-ray shows a new mild left lower lung atelectasis or infiltrate.  Otherwise no significant change compared to the x-ray on May 1.  EKG shows a sinus tachycardia with left anterior fascicular block.  Blood and urine cultures were drawn and he was initiated on IV Zosyn and given a oral dose of vancomycin through his PEG tube.  Admission was requested for ongoing management.      Past Medical History    I have reviewed this patient's medical history and updated it with pertinent information if needed.   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)     Patient has little porductive speech but at baseline can understand simple commands consistently     DVT of upper " extremity (deep vein thrombosis) (H)      Gastro-oesophageal reflux disease      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      Pneumonia      Seizures (H)     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     Septic shock (H)      Spastic hemiplegia affecting dominant side (H)     related to wil injury     Thyroid disease      Tracheostomy care (H)      Traumatic brain injury (H) 1989     Unspecified cerebral artery occlusion with cerebral infarction 1989     UTI (urinary tract infection)      Ventricular fibrillation (H)      Ventricular tachyarrhythmia (H)        Past Surgical History   I have reviewed this patient's surgical history and updated it with pertinent information if needed.  Past Surgical History:   Procedure Laterality Date     ENDOSCOPIC ULTRASOUND UPPER GASTROINTESTINAL TRACT (GI) N/A 1/30/2017    Procedure: ENDOSCOPIC ULTRASOUND, ESOPHAGOSCOPY / UPPER GASTROINTESTINAL TRACT (GI);  Surgeon: Jus Montana MD;  Location: [facility] OR     ENDOSCOPIC ULTRASOUND, ESOPHAGOSCOPY, GASTROSCOPY, DUODENOSCOPY (EGD), NECROSECTOMY N/A 2/7/2017    Procedure: ENDOSCOPIC ULTRASOUND, ESOPHAGOSCOPY, GASTROSCOPY, DUODENOSCOPY (EGD), NECROSECTOMY;  Surgeon: Jack Marcus MD;  Location: [facility] OR     ESOPHAGOSCOPY, GASTROSCOPY, DUODENOSCOPY (EGD), COMBINED  3/13/2014    Procedure: COMBINED ESOPHAGOSCOPY, GASTROSCOPY, DUODENOSCOPY (EGD), BIOPSY SINGLE OR MULTIPLE;  gastroscopy;  Surgeon: Digna Rhodes MD;  Location: [facility]     ESOPHAGOSCOPY, GASTROSCOPY, DUODENOSCOPY (EGD), COMBINED N/A 12/6/2016    Procedure: COMBINED ESOPHAGOSCOPY, GASTROSCOPY, DUODENOSCOPY (EGD);  Surgeon: Digna Rhodes MD;  Location: [facility]     ESOPHAGOSCOPY, GASTROSCOPY, DUODENOSCOPY (EGD), COMBINED N/A 2/7/2017    Procedure: COMBINED ENDOSCOPIC ULTRASOUND, ESOPHAGOSCOPY, GASTROSCOPY, DUODENOSCOPY (EGD), FINE NEEDLE ASPIRATE/BIOPSY;  Surgeon: Too Thakur MD;   Location: [facility] OR     HEAD & NECK SURGERY      reconstructive facial surgery following accident in      LAPAROSCOPIC APPENDECTOMY  2013    Procedure: LAPAROSCOPIC APPENDECTOMY;  LAPAROSCOPIC APPENDECTOMY;  Surgeon: Manish Pierce MD;  Location:  OR     LAPAROSCOPIC ASSISTED INSERTION TUBE GASTROTOMY N/A 2016    Procedure: LAPAROSCOPIC ASSISTED INSERTION TUBE GASTROSTOMY;  Surgeon: Manish Pierce MD;  Location:  OR     ORTHOPEDIC SURGERY      right hand repair     TRACHEOSTOMY N/A 9/3/2016    Procedure: TRACHEOSTOMY;  Surgeon: João Ortiz MD;  Location:  OR     TRACHEOSTOMY N/A 2016    Procedure: TRACHEOSTOMY;  Surgeon: João Ortiz MD;  Location:  OR     VASCULAR SURGERY         Prior to Admission Medications   Prior to Admission Medications   Prescriptions Last Dose Informant Patient Reported? Taking?   ACETAMINOPHEN PO  Mother Yes No   Si mg by Per Feeding Tube route every 4 hours as needed for pain   Brivaracetam (BRIVIACT) 10 MG/ML solution  Mother Yes No   Sig: Take 100 mg by mouth 2 times daily   VITAMIN D, CHOLECALCIFEROL, PO  Mother Yes No   Sig: Take 4,000 Units by mouth    albuterol (2.5 MG/3ML) 0.083% neb solution  Mother Yes No   Sig: Take 1 vial by nebulization every 4 hours as needed for shortness of breath / dyspnea or wheezing   amoxicillin-clavulanate (AUGMENTIN) 875-125 MG per tablet   No No   Si tablet by Per J Tube route 2 times daily   aspirin 10 mg/mL  Mother No No   Si.1 mLs (81 mg) by Per J Tube route daily   bacitracin ointment  Mother Yes No   Sig: Apply topically daily as needed for wound care To PEG site.   bisacodyl (DULCOLAX) 10 MG Suppository  Mother No No   Sig: Place 1 suppository (10 mg) rectally daily as needed for constipation   calcium carbonate 1250 (500 CA) MG/5ML SUSP suspension  Mother No No   Si mLs (1,250 mg) by Per J Tube route 3 times daily (with meals)   carBAMazepine (TEGRETOL) 100 MG/5ML  suspension  Mother Yes No   Sig: Take 150 mg by mouth every 6 hours   fiber modular (NUTRISOURCE FIBER) packet  Mother No No   Si packet by Per Feeding Tube route 3 times daily   hydrocortisone (CORTEF) 2 mg/mL  Mother No No   Sig: 10 mLs (20 mg) by Per J Tube route every morning   hydrocortisone (CORTEF) 2 mg/mL  Mother No No   Sig: Take 5 mLs (10 mg) by mouth every evening   levothyroxine (SYNTHROID) 25 mcg/mL  Mother No No   Si mLs (150 mcg) by Per J Tube route every morning (before breakfast)   Patient taking differently: 150 mcg by Per J Tube route every morning (before breakfast) Hold tube feeding 1 hour prior and 1 hour after medication administered.   melatonin (MELATONIN) 1 MG/ML LIQD liquid  Mother Yes No   Si mg by Jejunal Tube route nightly as needed for sleep   multivitamins with minerals (CERTAVITE/CEROVITE) LIQD liquid  Mother No No   Sig: 15 mLs by Per J Tube route daily   pantoprazole (PROTONIX) SUSP suspension  Mother Yes No   Sig: Take 10 mg by mouth daily   pantoprazole (PROTONIX) SUSP suspension  Mother Yes No   Sig: Take 10 mg by mouth every evening   potassium & sodium phosphates (NEUTRA-PHOS) 280-160-250 MG Packet  Mother Yes No   Sig: Take 1 packet by mouth 3 times daily 0900, 1500, 2100.    testosterone cypionate (DEPOTESTOTERONE CYPIONATE) 200 MG/ML injection  Mother Yes No   Sig: Inject 100 mg into the muscle See Admin Instructions Every 2 weeks.      Facility-Administered Medications: None     Allergies   Allergies   Allergen Reactions     Dilantin [Phenytoin Sodium]      Valproic Acid      Toxicity c bone marrow suspension, elevated ammonia levels        Social History   I have reviewed this patient's social history and updated it with pertinent information if needed. Keyon Farias  reports that he quit smoking about 29 years ago. He has never used smokeless tobacco. He reports that he does not drink alcohol or use illicit drugs.    Family History   I have reviewed this  patient's family history and updated it with pertinent information if needed.    - Reviewed and not pertinent.     Review of Systems   The 10 point Review of Systems is negative other than noted in the HPI or here.     Physical Exam   Temp: 98.3  F (36.8  C) Temp src: Axillary BP: 119/55 Pulse: 111 Heart Rate: 104 Resp: 19 SpO2: 97 % O2 Device: None (Room air)    Vital Signs with Ranges  Temp:  [98.3  F (36.8  C)-101.3  F (38.5  C)] 98.3  F (36.8  C)  Pulse:  [111] 111  Heart Rate:  [] 104  Resp:  [15-22] 19  BP: ()/(49-66) 119/55  SpO2:  [91 %-99 %] 97 %  0 lbs 0 oz    Constitutional: Middle aged white male. Awake, alert, cooperative, no apparent distress. Non-verbal.   Eyes: Conjunctiva and pupils examined and normal.  HEENT: Dry mucous membranes, normal dentition.   Respiratory: Clear to auscultation bilaterally at apices, no crackles or wheezing. Reduced at bases but no clear rhonchi heard.   Cardiovascular: Regular rate and rhythm, normal S1 and S2, and no murmur noted.  GI: Soft, non-distended, non-tender, normal bowel sounds. G-tube in place without signs of infection.   Lymph/Hematologic: No anterior cervical or supraclavicular adenopathy.  Skin: No cyanosis, no edema. Rash noted on his LLE and RUE that does not appear infections and without signs of cellulitis. Excoriations presents from scratching.    Musculoskeletal: No joint swelling, erythema or tenderness.  Neurologic: Non-verbal. Alert. Generalized weakness. No clear focal deficit.  Psychiatric: Alert, oriented to person, place, no obvious anxiety or depression.    Data   Data reviewed today:  I personally reviewed the chest x-ray image(s) showing Findings listed above. .    Recent Labs  Lab 05/15/18  2237   WBC 16.7*   HGB 13.8   MCV 87   *   *   POTASSIUM 4.2   CHLORIDE 96   CO2 26   BUN 20   CR 0.80   ANIONGAP 10   STEVE 8.5   GLC 86       Recent Results (from the past 24 hour(s))   XR Chest 2 Views    Narrative    XR CHEST 2  VW  5/15/2018 10:59 PM     INDICATION: Fever.    COMPARISON: 5/1/2018.      Impression    IMPRESSION: Shallow inspiration. New mild left lower lung atelectasis  or infiltrate. Otherwise no significant change. Normal heart size.  Postoperative changes cervical spine.    PANFILO KING MD

## 2018-05-16 NOTE — PROGRESS NOTES
[facility]    Sepsis Evaluation Progress Note    Date of Service: 05/16/2018    I was called to see Keyon GLADYS Farias due to follow up lactic acid 2.6. He is known to have an infection.     Physical Exam    Vital Signs:  Temp: 98.1  F (36.7  C) Temp src: Oral BP: 108/53 Pulse: 111 Heart Rate: 73 Resp: 16 SpO2: 95 % O2 Device: None (Room air)      Lab:  Lactic Acid   Date Value Ref Range Status   05/16/2018 2.6 (H) 0.4 - 2.0 mmol/L Final       The patient is at baseline mental status.    The rest of their physical exam is significant for clear lungs bilaterally, soft abdomen, skin clear. HR and blood pressure has normalized.    Assessment and Plan    The SIRS and exam findings are likely due to   sepsis.     ID: The patient is currently on the following antibiotics:  Anti-infectives (Future)    Start     Dose/Rate Route Frequency Ordered Stop    05/16/18 0800  piperacillin-tazobactam (ZOSYN) 3.375 g vial to attach to  mL bag     Comments:  Pharmacy can adjust dose based on renal function.    3.375 g  over 30 Minutes Intravenous EVERY 6 HOURS 05/16/18 0447          Current antibiotic coverage is appropriate for source of infection.    Fluid: Fluid bolus not indicated due to normalization of BP and pulse, and mental status. Will order PRN bolus for low UOP and increased IVF..    Lab: Repeat lactic acid is not indicated.    Disposition: The patient will remain on the current unit. We will continue to monitor this patient closely.    Leticia Santiago MD

## 2018-05-16 NOTE — ED NOTES
"Bed: ED01  Expected date: 5/15/18  Expected time: 10:00 PM  Means of arrival: Ambulance  Comments:  [facility] 57M \"not feeling well\"  "

## 2018-05-16 NOTE — PLAN OF CARE
Problem: Patient Care Overview  Goal: Plan of Care/Patient Progress Review  Discharge Planner PT   Patient plan for discharge: Pt unable to state.  Current status: Orders received and chart reviewed. Patient admitted with sepsis. At baseline, patient is dependent for mobility, ADLs and self cares. Pt receives Home cares including PT/OT/SLP/RN and PCA services. Patient lives with his mother in a handicap accessible. Pt would benefit from transferring up to chair with assist from nursing with use of ceiling lift and sling.   Barriers to return to prior living situation: None indicated.   Recommendations for discharge: Return home with assist from mother and Home cares.   Rationale for recommendations: No IP PT needs indicated as pt is at baseline in regards to mobility.       Entered by: Laine Montez 05/16/2018 11:25 AM

## 2018-05-16 NOTE — ED PROVIDER NOTES
History     Chief Complaint:  Fever     History limited due to patient being nonverbal and subsequently provided by EMS.   HPI   Keyon Farias is a 57 year old male who presents to the emergency department today via EMS for evaluation of a fever. Per EMS report the patient was found to be febrile to 102 this evening by a home health aide. The patient was recently admitted to the hospital on 5/1/2018 for pneumonia so the fever concerned them so he was brought to the emergency department for evaluation. En route the patient had systolic blood pressure in the 130's. Patient is nonverbal at baseline secondary to a stroke and previous TBI.       Allergies:  Dilantin [Phenytoin Sodium]  Valproic Acid      Medications:    ACETAMINOPHEN PO  albuterol (2.5 MG/3ML) 0.083% neb solution  amoxicillin-clavulanate (AUGMENTIN) 875-125 MG per tablet  aspirin 10 mg/mL  bacitracin ointment  bisacodyl (DULCOLAX) 10 MG Suppository  Brivaracetam (BRIVIACT) 10 MG/ML solution  calcium carbonate 1250 (500 CA) MG/5ML SUSP suspension  carBAMazepine (TEGRETOL) 100 MG/5ML suspension  fiber modular (NUTRISOURCE FIBER) packet  hydrocortisone (CORTEF) 2 mg/mL  hydrocortisone (CORTEF) 2 mg/mL  levothyroxine (SYNTHROID) 25 mcg/mL  melatonin (MELATONIN) 1 MG/ML LIQD liquid  multivitamins with minerals (CERTAVITE/CEROVITE) LIQD liquid  pantoprazole (PROTONIX) SUSP suspension  pantoprazole (PROTONIX) SUSP suspension  potassium & sodium phosphates (NEUTRA-PHOS) 280-160-250 MG Packet  testosterone cypionate (DEPOTESTOTERONE CYPIONATE) 200 MG/ML injection  VITAMIN D, CHOLECALCIFEROL, PO    Past Medical History:    Aphasia   DVT   GERD   Panhypopituitarism   Pneumonia   Seizures   Septic shock   Thyroid disease   Spastic hemiplegia on dominant side   Traumatic brain injury   UTI   Ventricular  tachycardia     Past Surgical History:    Endoscopic upper GI ultrasound, multiple   EGD combined, multiple   Reconstructive facial surgery   Appendectomy    Tracheostomy     Family History:    History reviewed. No pertinent family history.        Social History:  The patient was accompanied to the ED by EMS.  Smoking Status: Former smoker  Smokeless Tobacco: No  Alcohol Use: No    Marital Status:  Single      Review of Systems   Constitutional: Positive for fever.   All other systems reviewed and are negative.    Physical Exam   /50 (BP Location: Left arm)  Pulse 111  Temp 96.7  F (35.9  C) (Axillary)  Resp 16  Wt 75.3 kg (166 lb)  SpO2 98%  BMI 22.51 kg/m2     Physical Exam  SKIN:  Warm, dry.  Superficial abrasions/excoriaitons of limbs, non-inflamed.  HEMATOLOGIC/IMMUNOLOGIC/LYMPHATIC:  No pallor.  No edema.  No petechia or purpura.  HENT:  Moist oral mucosa.  No JVD.  EYES:  Conjunctivae normal.  CARDIOVASCULAR:  Tachycardic rate and regular rhythm.  No murmur.  RESPIRATORY:  No respiratory distress, breath sounds equal and normal.  GASTROINTESTINAL:  Soft, nontender abdomen with active bowel sounds.  No mass or distension.  MUSCULOSKELETAL:  Full seemingly painless upper and lower extremity range of motion.  No large limb joint inflammation.  NEUROLOGIC:  Alert, non-verbal.    Emergency Department Course     ECG:  Indication: Fever  Completed at 2222.  Read at 2226.   Sinus tachycardia. Left anterior fascicular block. Abnormal ECG.   Rate 131 bpm. UT interval 132. QRS duration 96. QT/QTc 330/487. P-R-T axes 50 -74 57.     Imaging:  Radiology findings were communicated with the patient who voiced understanding of the findings.    XR Chest 2 Views:  IMPRESSION: Shallow inspiration. New mild left lower lung atelectasis  or infiltrate. Otherwise no significant change. Normal heart size.  Postoperative changes cervical spine.  Report per radiology     Laboratory:  Laboratory findings were communicated with the patient who voiced understanding of the findings.    CBC: WBC 16.7 (H), HGB 13.8,  (L)  BMP: Na 132 (L), o/w WNL (Creatinine 0.80)   Lactic  Acid: 2.5 (H)   Procalcitonin: 0.05    UA: Yellow and clear urine. Protein albumin urine 10, o/w WNL      Blood cultures: Pending   Urine Culture Aerobic Bacterial: Pending     Interventions:  2240 NS Bolus 1,000mL IV   2350 LR 1,000mL IV  0058 LR 1,000mL IV    0200 Vancomycin 125mg PO      Emergency Department Course:  Nursing notes and vitals reviewed.  2205 I performed an exam of the patient as documented above.   The patient was sent for a chest x-ray while in the emergency department, results above.    The patient provided a urine sample here in the emergency department. This was sent for laboratory testing, findings above.   IV was inserted and blood was drawn for laboratory testing, results above.   EKG obtained in the ED, see results above.    0000 Patient rechecked and updated.  Patient's mother in room. She notes the patient has had some recent diarrhea since beginning an antibiotic.   0101 I spoke with Dr. Christianson of the Hospitalist service regarding patient's presentation, findings, and plan of care.   I discussed the treatment plan with the patient. They expressed understanding of this plan and consented to admission. I discussed the patient with Dr. Christianson, who will admit the patient to a monitored bed for further evaluation and treatment. I personally reviewed the laboratory and imaging results with the Patient and answered all related questions prior to admission.   Impression & Plan      The patient has signs of Severe Sepsis as evidenced by:    1. 2 SIRS criteria, AND  2. Suspected infection, AND   3. Organ dysfunction: Lactic Acid > 1.9    Time severe sepsis diagnosis confirmed = 2255 as this was the time when Lactate resulted, and the level was > 1.9      3 Hour Severe Sepsis Bundle Completion:  1. Initial Lactic Acid Result:   Recent Labs   Lab Test  05/16/18 2025 05/16/18   1126  05/16/18   0655   LACT  1.7  2.6*  2.3*     2. Blood Cultures before Antibiotics: Yes  3. Broad Spectrum  Antibiotics Administered: Yes     Anti-infectives (Future)    Start     Dose/Rate Route Frequency Ordered Stop    05/16/18 1400  piperacillin-tazobactam (ZOSYN) 4.5 g vial to attach to  mL bag     Comments:  Pharmacy can adjust dose based on renal function.    4.5 g  over 30 Minutes Intravenous EVERY 6 HOURS 05/16/18 1258          4. 3000 ml of IV fluids.  Ideal body weight: 77.6 kg (171 lb 1.2 oz)    Medical Decision Making:  This patient presents with a febrile illness and has a history of sepsis secondary to presumed aspiration pneumonia. New diarrhea . Here he did have a brief drop in blood pressure to 80's systolic but that recovered without significant fluid resuscitation. He received aggressive sepsis fluid resuscitation and did not become hypotensive again, hemodynamically stable in the ED.  Lactic acid elevated as was WBC and given his presentation and low blood pressure he is suffering severe sepsis. Zosyn administered as a broad spectrum option with consideration of recurrent aspiration pneumonia given the xray result. Also given empiric treatment with Vancomycin for possible Clostridium difficile.    Diagnosis:    ICD-10-CM    1. Severe sepsis (H) A41.9 Urine Culture    R65.20 Blood culture     Blood culture     Procalcitonin     Procalcitonin     Lactic acid     Basic metabolic panel     CBC with platelets differential     Lactic acid whole blood     Hepatic panel     Hepatic panel     Lipase     Lipase     Lactic acid     Lactic acid     CBC with platelets     Basic metabolic panel     CANCELED: Procalcitonin     CANCELED: Hepatic panel     CANCELED: Platelet count     CANCELED: Lipase   2. Diarrhea, unspecified type R19.7        Disposition:  Admitted under the supervision of Dr. Sammy Germain Disclosure:  Ruben DYKES, am serving as a scribe at 10:08 PM on 5/15/2018 to document services personally performed by Too Rodarte MD based on my observations and the provider's  statements to me.    5/15/2018    EMERGENCY DEPARTMENT       Too Rodarte MD  05/17/18 0852

## 2018-05-16 NOTE — CONSULTS
CLINICAL NUTRITION SERVICES  -  ASSESSMENT NOTE      Future/Additional Recommendations:     When able to begin TF, rec:  Type of Feeding Tube: 18 Fr TORY GJ tube  Enteral Frequency:  Continuous while hospitalized  Enteral Regimen: Isosource 1.5 @ 60 mL/hr + 1 packet of Nutrisource Fiber TID  Total Enteral Provisions: 2205 cals (29 cals/kg), 98 gm pro (1.3 gm/kg), 31 gm fiber, 253 gm CHO, 1094 mL free water  Free Water Flush: 60 mL every 4 hrs (while on IVF)     Malnutrition: (5/16)  % Weight Loss:  None noted  % Intake:  No decreased intake noted  Subcutaneous Fat Loss:  None observed  Muscle Loss:  None observed  Fluid Retention:  None noted    Malnutrition Diagnosis: Patient does not meet two of the above criteria necessary for diagnosing malnutrition        REASON FOR ASSESSMENT  Keyon Farias is a 57 year old male seen by Registered Dietitian for Admission Nutrition Risk Screen -  TF or Parenteral Nutrition        NUTRITION HISTORY  Pt well known to our service    Lives with his mother, Savannah, who is his caretaker    TF via GJ tube  (replaced 5/14/18 - 18 Fr TORY GJ tube)  Has been on TF since 8/2016  TF provided by [facility] meds:   Nutrisource Fiber - 1 pkt TID  Neutra-phos  Multivitamin    Visited with pt this morning (mother not here)  He gave me a thumbs up that he has been running his TF everyday at home and that his usual regimen has not changed    Per last RD note: (5/2/18)  He has been on a daytime feeding schedule from 7 AM to 9 PM:  Isosource 1.5, 5.5 cans daily.   Savannah is not sure of the rate or H2O flushes.  He also receives Nutrisource Fiber, 1 packet TID.  This regimen provides 2063 jamel (27 jamel/kg), 94 gm protein (1.2 gm/kg), 1045 mL H2O, 30 gm fiber, 242 gm CHO.    SLP has followed pt (home care visits) and last recommendation was for continued NPO      CURRENT NUTRITION ORDERS  Diet Order:     NPO       PHYSICAL FINDINGS  Observed  No nutrition-related physical findings  "observed  Obtained from Chart/Interdisciplinary Team  Non verbal  No edema  18 Fr TORY GJ tube    ANTHROPOMETRICS  Height: 6'0\"  Weight: (last wt in Care Everywhere - 5/9/18) 74.8 kg  BMI:  22.3  Weight Status:  Normal BMI  IBW: 80.9 kg  % IBW: 92%  Weight History: stable  Wt Readings from Last 10 Encounters:   05/05/18 77.4 kg (170 lb 10.2 oz)   04/04/18 72.6 kg (160 lb)   03/13/18 70.3 kg (155 lb)   02/21/18 73 kg (161 lb)   12/31/17 76.2 kg (168 lb)   10/07/17 77.3 kg (170 lb 6.7 oz)   07/27/17 73.9 kg (163 lb)   06/15/17 70.3 kg (155 lb)   06/07/17 70.7 kg (155 lb 12.8 oz)   05/15/17 74.8 kg (165 lb)         LABS  Labs reviewed    MEDICATIONS  Nutrisource Fiber - 1 pkt TID  Neutra-phos  Multivitamin  IVF (NaCl) @ 100 mL/hr      ASSESSED NUTRITION NEEDS PER APPROVED PRACTICE GUIDELINES:    Dosing Weight: (5/9) 74.8 kg    Estimated Energy Needs: 7829-6357 kcals (25-30 Kcal/Kg)  Justification: maintenance  Estimated Protein Needs:  grams protein (1.2-1.5 g pro/Kg)  Justification: preservation of lean body mass  Estimated Fluid Needs: 2932-6906  mL (1 mL/Kcal)  Justification: maintenance    MALNUTRITION:  % Weight Loss:  None noted  % Intake:  No decreased intake noted  Subcutaneous Fat Loss:  None observed  Muscle Loss:  None observed  Fluid Retention:  None noted    Malnutrition Diagnosis: Patient does not meet two of the above criteria necessary for diagnosing malnutrition      NUTRITION DIAGNOSIS:  Inadequate protein-energy intake related to current NPO status and TF not running as evidenced by pt meeting 0% est needs      NUTRITION INTERVENTIONS  Recommendations / Nutrition Prescription  NPO    When able to begin TF, rec:  Type of Feeding Tube: 18 Fr TORY GJ tube  Enteral Frequency:  Continuous while hospitalized  Enteral Regimen: Isosource 1.5 @ 60 mL/hr + 1 packet of Nutrisource Fiber TID  Total Enteral Provisions: 2205 cals (29 cals/kg), 98 gm pro (1.3 gm/kg), 31 gm fiber, 253 gm CHO, 1094 mL free " water  Free Water Flush: 60 mL every 4 hrs (while on IVF)  .      Implementation  Nutrition education ---> explained to pt that we will begin TF when cleared by MD  .    Nutrition Goals  Pt to resume TF in the next 48 hrs  .      MONITORING AND EVALUATION:  Progress towards goals will be monitored and evaluated per protocol and Practice Guidelines

## 2018-05-16 NOTE — PROGRESS NOTES
Care Transition Initial Assessment - RN        Met with: Patient and mother.  DATA   Active Problems:    Fever       Cognitive Status: non-verbal        Contact information and PCP information verified: Yes  Lives With: mother                    Insurance concerns: no  ASSESSMENT  Patient currently receives the following services:  [facility]. [phone]. Uses PT/OT/SLP/RN.  PCA 12 hrs/day. Mother cares for him 12 hrs/day        Identified issues/concerns regarding health management: no    PLAN  Financial costs for the patient include none .  Patient given options and choices for discharge yes  Patient/family is agreeable to the plan?  Yes  Patient anticipates discharging to  Home w resumed HHC .        Patient anticipates needs for home equipment: no. Mother declines a lift- the caregivers transfer him to chair heavy assist x1.   Plan/Disposition: home resume HHC   Appointments: TBD     Care  (CTS) will continue to follow as needed.

## 2018-05-16 NOTE — ED NOTES
[facility]  ED Nurse Handoff Report    ED Chief complaint: Fever (pt had temp of 102 at home. per EMS pt has been battling pneumonia x1 month. pt non-verbal )      ED Diagnosis:   Final diagnoses:   Severe sepsis (H)   Diarrhea, unspecified type       Code Status: Full Code    Allergies:   Allergies   Allergen Reactions     Dilantin [Phenytoin Sodium]      Valproic Acid      Toxicity c bone marrow suspension, elevated ammonia levels        Activity level - Baseline/Home:  Total Care    Activity Level - Current:   Total Care     Needed?: No- pt non verbal. 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   Cardiac: pt tachycardic and hypotensive upon arrival. Pressure and heart rate have improved with fluids  Neuro: pt at his baseline. Smiles and gives thumbs up. Pt is non verbal from prior stroke and TBI   GI: pt has had multiple episodes of diarrhea recently. Has been on antibiotics recently for pneumonia.     Of note: Pt has GJ tube. Is a very difficult IV start. Pt is incontinent of bowel and bladder. Had temp of 102 prior to arrival.   Tests Performed: labs, xray  Abnormal Results: lactate 2.5, WBC 16.7  Treatments provided: vanco in g tube. IV abx. Fluids.    Family Comments: mother at bedside    OBS brochure/video discussed/provided to patient: N/A    ED Medications:   Medications   sodium chloride (PF) 0.9% PF flush 3 mL (not administered)   sodium chloride (PF) 0.9% PF flush 3 mL (not  administered)   piperacillin-tazobactam (ZOSYN) 3.375 g vial to attach to  mL bag (not administered)   0.9% sodium chloride BOLUS (0 mLs Intravenous Stopped 5/16/18 0058)   0.9% sodium chloride BOLUS (0 mLs Intravenous Stopped 5/15/18 2350)   lactated ringers BOLUS 1,000 mL (1,000 mLs Intravenous New Bag 5/16/18 0058)   vancomycin (FIRST) solution 125 mg (125 mg Oral Given 5/16/18 0200)       Drips infusing?:  Yes    For the majority of the shift this patient was Green.   Interventions performed were n/a.    Severe Sepsis OR Septic Shock Diagnosis Present:   Yes    Per the ED Provider, Time Zero for severe sepsis or septic shock is:  2255    3 Hour Severe Sepsis Bundle Completion:  1. Initial Lactic Acid Result:   Recent Labs   Lab Test  05/15/18   2237  05/02/18   2125  05/02/18   1655   LACT  2.5*  2.2*  2.6*     2. Blood Cultures before Antibiotics: Yes  3. Broad Spectrum Antibiotics Administered:     Anti-infectives (Future)    Start     Dose/Rate Route Frequency Ordered Stop    05/16/18 0129  piperacillin-tazobactam (ZOSYN) 3.375 g vial to attach to  mL bag      3.375 g  over 30 Minutes Intravenous ONCE 05/16/18 0128          4. 3000 ml of IV fluids have been given so far      6 Hour Severe Sepsis Bundle Completion:    1. Repeat Lactic Acid Level: Not drawn- lab draw ordered. Lab states on the way.   2. Patient currently on Vasopressors =  No      ED NURSE PHONE NUMBER: [phone]

## 2018-05-16 NOTE — PLAN OF CARE
Problem: Patient Care Overview  Goal: Plan of Care/Patient Progress Review  Outcome: No Change  Pt is alert to self, non verbal. VSS, denies pain. Lactic acid fired at 2.6, 500ml bolus given. CT done today. Unable to get stool or sputum specimen. NPO. Meds given via g tube. Repo q2. Inc of B&B. Pt on IV ABX.

## 2018-05-17 ENCOUNTER — ANESTHESIA EVENT (OUTPATIENT)
Dept: ONCOLOGY | Facility: CLINIC | Age: 58
DRG: 871 | End: 2018-05-17
Payer: MEDICARE

## 2018-05-17 ENCOUNTER — ANESTHESIA (OUTPATIENT)
Dept: ONCOLOGY | Facility: CLINIC | Age: 58
DRG: 871 | End: 2018-05-17
Payer: MEDICARE

## 2018-05-17 LAB
ANION GAP SERPL CALCULATED.3IONS-SCNC: 7 MMOL/L (ref 3–14)
BACTERIA SPEC CULT: NO GROWTH
BUN SERPL-MCNC: 9 MG/DL (ref 7–30)
CALCIUM SERPL-MCNC: 8.8 MG/DL (ref 8.5–10.1)
CHLORIDE SERPL-SCNC: 114 MMOL/L (ref 94–109)
CO2 SERPL-SCNC: 28 MMOL/L (ref 20–32)
CREAT SERPL-MCNC: 0.86 MG/DL (ref 0.66–1.25)
ERYTHROCYTE [DISTWIDTH] IN BLOOD BY AUTOMATED COUNT: 14.5 % (ref 10–15)
GFR SERPL CREATININE-BSD FRML MDRD: >90 ML/MIN/1.7M2
GLUCOSE SERPL-MCNC: 91 MG/DL (ref 70–99)
HCT VFR BLD AUTO: 35.8 % (ref 40–53)
HGB BLD-MCNC: 11.9 G/DL (ref 13.3–17.7)
Lab: NORMAL
MCH RBC QN AUTO: 30.4 PG (ref 26.5–33)
MCHC RBC AUTO-ENTMCNC: 33.2 G/DL (ref 31.5–36.5)
MCV RBC AUTO: 92 FL (ref 78–100)
PLATELET # BLD AUTO: 116 10E9/L (ref 150–450)
POTASSIUM SERPL-SCNC: 3.7 MMOL/L (ref 3.4–5.3)
RBC # BLD AUTO: 3.91 10E12/L (ref 4.4–5.9)
SODIUM SERPL-SCNC: 146 MMOL/L (ref 133–144)
SODIUM SERPL-SCNC: 149 MMOL/L (ref 133–144)
SPECIMEN SOURCE: NORMAL
WBC # BLD AUTO: 9 10E9/L (ref 4–11)

## 2018-05-17 PROCEDURE — A9270 NON-COVERED ITEM OR SERVICE: HCPCS | Mod: GY | Performed by: INTERNAL MEDICINE

## 2018-05-17 PROCEDURE — 25000128 H RX IP 250 OP 636: Performed by: INTERNAL MEDICINE

## 2018-05-17 PROCEDURE — 25000132 ZZH RX MED GY IP 250 OP 250 PS 637: Mod: GY | Performed by: INTERNAL MEDICINE

## 2018-05-17 PROCEDURE — 80048 BASIC METABOLIC PNL TOTAL CA: CPT | Performed by: INTERNAL MEDICINE

## 2018-05-17 PROCEDURE — 99221 1ST HOSP IP/OBS SF/LOW 40: CPT | Performed by: SURGERY

## 2018-05-17 PROCEDURE — 84295 ASSAY OF SERUM SODIUM: CPT | Performed by: INTERNAL MEDICINE

## 2018-05-17 PROCEDURE — 36415 COLL VENOUS BLD VENIPUNCTURE: CPT | Performed by: INTERNAL MEDICINE

## 2018-05-17 PROCEDURE — 12000000 ZZH R&B MED SURG/OB

## 2018-05-17 PROCEDURE — 27210429 ZZH NUTRITION PRODUCT INTERMEDIATE LITER

## 2018-05-17 PROCEDURE — 85027 COMPLETE CBC AUTOMATED: CPT | Performed by: INTERNAL MEDICINE

## 2018-05-17 PROCEDURE — 37000011 ZZH ANESTHESIA WARD SERVICE: Performed by: NURSE ANESTHETIST, CERTIFIED REGISTERED

## 2018-05-17 PROCEDURE — 40000671 ZZH STATISTIC ANESTHESIA CASE

## 2018-05-17 PROCEDURE — 99233 SBSQ HOSP IP/OBS HIGH 50: CPT | Performed by: INTERNAL MEDICINE

## 2018-05-17 PROCEDURE — 27210995 ZZH RX 272: Performed by: INTERNAL MEDICINE

## 2018-05-17 RX ORDER — HYDROCORTISONE 10 MG/1
20 TABLET ORAL EVERY MORNING
Status: DISCONTINUED | OUTPATIENT
Start: 2018-05-18 | End: 2018-05-19 | Stop reason: HOSPADM

## 2018-05-17 RX ORDER — HYDROCORTISONE 10 MG/1
10 TABLET ORAL EVERY EVENING
Status: DISCONTINUED | OUTPATIENT
Start: 2018-05-17 | End: 2018-05-19 | Stop reason: HOSPADM

## 2018-05-17 RX ORDER — TESTOSTERONE CYPIONATE 200 MG/ML
100 INJECTION, SOLUTION INTRAMUSCULAR ONCE
Status: DISCONTINUED | OUTPATIENT
Start: 2018-05-17 | End: 2018-05-19 | Stop reason: HOSPADM

## 2018-05-17 RX ORDER — PIPERACILLIN SODIUM, TAZOBACTAM SODIUM 4; .5 G/20ML; G/20ML
4.5 INJECTION, POWDER, LYOPHILIZED, FOR SOLUTION INTRAVENOUS EVERY 6 HOURS
Status: COMPLETED | OUTPATIENT
Start: 2018-05-17 | End: 2018-05-18

## 2018-05-17 RX ORDER — AMOXICILLIN AND CLAVULANATE POTASSIUM 400; 57 MG/5ML; MG/5ML
500 POWDER, FOR SUSPENSION ORAL EVERY 8 HOURS
Status: DISCONTINUED | OUTPATIENT
Start: 2018-05-18 | End: 2018-05-19 | Stop reason: HOSPADM

## 2018-05-17 RX ADMIN — PIPERACILLIN SODIUM,TAZOBACTAM SODIUM 4.5 G: 4; .5 INJECTION, POWDER, FOR SOLUTION INTRAVENOUS at 14:39

## 2018-05-17 RX ADMIN — BRIVARACETAM 100 MG: 10 SOLUTION ORAL at 21:56

## 2018-05-17 RX ADMIN — SODIUM CHLORIDE: 9 INJECTION, SOLUTION INTRAVENOUS at 08:12

## 2018-05-17 RX ADMIN — Medication 1 PACKET: at 21:56

## 2018-05-17 RX ADMIN — Medication 1 PACKET: at 16:32

## 2018-05-17 RX ADMIN — PANTOPRAZOLE SODIUM 10 MG: 40 TABLET, DELAYED RELEASE ORAL at 08:12

## 2018-05-17 RX ADMIN — PIPERACILLIN SODIUM,TAZOBACTAM SODIUM 4.5 G: 4; .5 INJECTION, POWDER, FOR SOLUTION INTRAVENOUS at 08:13

## 2018-05-17 RX ADMIN — POTASSIUM CHLORIDE: 149 INJECTION, SOLUTION, CONCENTRATE INTRAVENOUS at 10:22

## 2018-05-17 RX ADMIN — MULTIVITAMIN 15 ML: LIQUID ORAL at 08:13

## 2018-05-17 RX ADMIN — POTASSIUM & SODIUM PHOSPHATES POWDER PACK 280-160-250 MG 1 PACKET: 280-160-250 PACK at 21:56

## 2018-05-17 RX ADMIN — TRIAMCINOLONE ACETONIDE: 5 CREAM TOPICAL at 08:12

## 2018-05-17 RX ADMIN — LEVOTHYROXINE SODIUM 150 MCG: 150 TABLET ORAL at 06:31

## 2018-05-17 RX ADMIN — PIPERACILLIN SODIUM,TAZOBACTAM SODIUM 4.5 G: 4; .5 INJECTION, POWDER, FOR SOLUTION INTRAVENOUS at 20:17

## 2018-05-17 RX ADMIN — ASPIRIN 81 MG 81 MG: 81 TABLET ORAL at 08:12

## 2018-05-17 RX ADMIN — CARBAMAZEPINE 150 MG: 100 SUSPENSION ORAL at 21:56

## 2018-05-17 RX ADMIN — CARBAMAZEPINE 150 MG: 100 SUSPENSION ORAL at 03:47

## 2018-05-17 RX ADMIN — TRIAMCINOLONE ACETONIDE: 5 CREAM TOPICAL at 20:28

## 2018-05-17 RX ADMIN — PIPERACILLIN SODIUM,TAZOBACTAM SODIUM 4.5 G: 4; .5 INJECTION, POWDER, FOR SOLUTION INTRAVENOUS at 01:32

## 2018-05-17 RX ADMIN — CARBAMAZEPINE 150 MG: 100 SUSPENSION ORAL at 16:32

## 2018-05-17 RX ADMIN — HYDROCORTISONE 10 MG: 10 TABLET ORAL at 20:17

## 2018-05-17 RX ADMIN — Medication 1 PACKET: at 08:13

## 2018-05-17 RX ADMIN — HYDROCORTISONE SODIUM SUCCINATE 50 MG: 100 INJECTION, POWDER, FOR SOLUTION INTRAMUSCULAR; INTRAVENOUS at 06:28

## 2018-05-17 RX ADMIN — BRIVARACETAM 100 MG: 10 SOLUTION ORAL at 09:16

## 2018-05-17 RX ADMIN — POTASSIUM & SODIUM PHOSPHATES POWDER PACK 280-160-250 MG 1 PACKET: 280-160-250 PACK at 16:32

## 2018-05-17 RX ADMIN — HEPARIN SODIUM 5000 UNITS: 5000 INJECTION, SOLUTION INTRAVENOUS; SUBCUTANEOUS at 06:28

## 2018-05-17 RX ADMIN — CARBAMAZEPINE 150 MG: 100 SUSPENSION ORAL at 09:16

## 2018-05-17 RX ADMIN — HEPARIN SODIUM 5000 UNITS: 5000 INJECTION, SOLUTION INTRAVENOUS; SUBCUTANEOUS at 17:27

## 2018-05-17 RX ADMIN — POTASSIUM & SODIUM PHOSPHATES POWDER PACK 280-160-250 MG 1 PACKET: 280-160-250 PACK at 08:13

## 2018-05-17 NOTE — PROGRESS NOTES
CT shows:     IMPRESSION:  1. The foreign body in the ascending colon/cecum likely representing a  migrated G-tube projects at least partially outside the lumen,  perforation of the ascending colon/cecum is a potential source of  sepsis.  2. Bibasilar infiltrates in the lungs compatible with pneumonia.    I went back to re-evaluate patient. He is smiling giving me a thumbs up. No abdominal pain with palpation throughout. G-tube looks good.   Based on current clinical picture, no indication for urgent surgical intervention.   Discussed with Dr. Hummel, general surgeon, who is going to review films and determine if we should do exploratory surgery to determine if this is a potential source of his recurring sepsis.     Repeat lactic acid. Given his stable vital signs and good appearance, would not  if remains stable, under 3.

## 2018-05-17 NOTE — PROGRESS NOTES
[facility]    Hospitalist Progress Note    Date of Service (when I saw the patient): 05/17/2018     Assessment & Plan    Keyon Farias is a 57 year old male with a past medical hx of TBI, aphasia, dysphagia with g-tube, seizure disorder, panhypopituitarism and recurrent admissions with sepsis and aspiration PNA. He was admitted in Feb with sepsis secondary to right lower lobe pneumonia and in April with sepsis of unclear source. His most recent admission was earlier this month from 5/1-5/5 with sepsis 2/2 Asp Pna. He was discharged home on 5/5 with 6 days if Augmentin. He lives with his mother who cares for him and had been doing well per her report up until today when he developed sudden onset of shaking chills, fever of 102.      Recurrent Sepsis likely secondary to aspiration PNA CT chest abdomen pelvis on 5/16: an apparent migrated G-tube projects at least partially outside the lumen of the ascending colon,suggesting possible perforation as source of sepsis. Also showed bilateral infiltrates compatible with PNA/ He is at risk for recurrent aspiration PNA but no clear sx to suggest acute PNA on presentations no new cough or hypoxia. G-tube replaced by IR on 5/15 so a possible leak could be the source but unclear. But originally g-tube placed in 2016 and question if this is a prior g-tube that was ingested now causing problems.   - Covering with Zosyn. If remains afebrile tomorrow, cx negative. Switch to PO Augmentin and follow x 24 hours.    - Discussed with Gen Surgeon, Dr. Hummel on 05/16/18 who reviewed films and will be seeing patient today.   - On 05/17/18, afebrile, hemodynamically stable, good oxygenation. Looks well, benign abdominal exam.   - Hold tube feeds until seen by surgery. He can continue all medications through g-tubes as he is tolerating these well with resolution of sepsis, no abdominal pain.     Addendum: Appreciate surgery consult. Foreign body on abdominal CT is likely from  retained stent placed for drainage necrotic pancreatitis, same in same location on prior films. No evidence of acute perforation.   - Care coordinator consult placed to set up follow up with [facility] gastroenterology, Dr. Meza regarding removal, possibly by colonoscopy. Likelihood that this is causing recurring sepsis appears low.     Dysphagia, on TF   Question recurring aspiration with TF's, leading to recurring presentations for sepsis. G-Tube replaced 5/15/18.   - HOB up 30 degrees with TF via J tube.   - Resume TF's on 05/17/18   - Underwent a recent video swallow after his recent admission and continued NPO was advised.    - Hold on further trials with speech therapy at this time.      Addendum: - Discussed with mother twice today by phone. She is frustrated by recurrent sepsis, suspected aspiration. She wants to advance his diet, relating that PNA's have been much more common since he started on tube feeds. He already has a J-tube. HOB is always up 30 degrees during feeding.     - Reviewed video swallow on 5/8 through [place] in [facility] Everywhere showing penetration and aspiration with honey thick barium and applesauce mixed with barium without cough response. Nasopharyngeal reflux.     Explained that further challenges with PO are not recommended at this time and would likely cause worsening.     Hypernatremia due to NPO / no TFs or fluid flushes on NS.     Recent Labs  Lab 05/17/18  0645 05/16/18  0655 05/15/18  2237   * 137 132*   - Change IVF to D5 W + 20 KCl @ 120 cc/h. Follow at noon.  - Start fluid flushes with TF's once resumed.     Addendum: F/u Na 146. D/c'd IVF with initiation of TF with free water flushes.     Panhypopituitarism    - Convert stress dose steroids > PTA doses of hydrocortisone on 05/17/18.   - Continue levothyroxine.   - Resume q 2 weeks Testosterone injections. Mother is concerned as he is overdue. Mother can bring from vial from home for pharmacy to verify before  administration.       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     Mild thrombocytopenia secondary to sepsis - periodic monitoring    Recent Labs  Lab 05/17/18  0645 05/16/18  0655 05/15/18  2237   * 104* 131*       H/o necrotizing pancreatitis  Abdominal exam benign, no evidence of pancreatitis on CT. Nl lipase.      Chronic pruritis   - Continue PTA triamcinolone ointment BID to areas of itching.    # Pain Assessment:  Current Pain Score 5/16/2018   Patient currently in pain? denies   Pain score (0-10) -   Pain location -   Pain descriptors -   rFLACC pain score -   CPOT pain score -   Keyon liriano pain level was assessed and he currently denies pain.       DVT Prophylaxis: Heparin SQ  Code Status: Full Code was discussed.      Disposition:  Hopeful discharge on Saturday if does well after switch to PO antibiotics.     Communication: Discussed with RN, called mother no answer, I would be happy to speak with her when she comes in on 05/16/18    Leticia HENDERSONMaurizio Santiago  [phone] (p)  Text Page (7AM - 6PM)     Interval History   Patient is no longer having fevers, smiling very interactive. BP and HR normalized.  No BM, no sputum production.     -Data reviewed today: I reviewed all new labs and imaging results over the last 24 hours. I personally reviewed no images or EKG's today.    Physical Exam   Temp: 97.8  F (36.6  C) Temp src: Axillary BP: 130/51   Heart Rate: 62 Resp: 16 SpO2: 99 % O2 Device: None (Room air)    Vitals:    05/17/18 0535   Weight: 75.3 kg (166 lb)     Vital Signs with Ranges  Temp:  [95.7  F (35.4  C)-98.2  F (36.8  C)] 97.8  F (36.6  C)  Heart Rate:  [50-70] 62  Resp:  [16-18] 16  BP: (115-137)/(39-66) 130/51  SpO2:  [98 %-100 %] 99 %  I/O last 3 completed shifts:  In: 180 [NG/GT:180]  Out: -     Constitutional:  NAD,   Neuropsyche:  Sleeping initially. Awakes with exam, smiles, gives me the thumbs up, interactive, due to baseline cognitive impairment, not able  to answer questions appropriately.   Respiratory:  Breathing comfortably, good air exchange, no wheezes, no crackles.   Cardiovascular:  Regular rate and rhythm, no edema.   GI:  soft, NT/ND, G-tube site looks good, BS normal  Skin/Integumen:  No acute rash or sign of bleeding.     Medications   All medications reviewed on 05/17/18      IV infusion builder WITH additives         aspirin  81 mg Per J Tube Daily     Brivaracetam  100 mg Oral BID     carBAMazepine  150 mg Oral Once     carBAMazepine  150 mg Oral Q6H     fiber modular  1 packet Per Feeding Tube TID     heparin  5,000 Units Subcutaneous Q12H     hydrocortisone  10 mg Oral QPM     [START ON 5/18/2018] hydrocortisone  20 mg Per J Tube QAM     levothyroxine  150 mcg Per J Tube QAM AC     multivitamins with minerals  15 mL Per J Tube Daily     pantoprazole  10 mg Oral BID     piperacillin-tazobactam  4.5 g Intravenous Q6H     potassium & sodium phosphates  1 packet Oral TID     sodium chloride (PF)  3 mL Intracatheter Q8H     sodium chloride (PF)  3 mL Intracatheter Q8H     testosterone cypionate  100 mg Intramuscular Once     triamcinolone   Topical BID       Data     Recent Labs  Lab 05/17/18  0645 05/16/18  0655 05/15/18  2237   WBC 9.0 18.3* 16.7*   HGB 11.9* 11.8* 13.8   MCV 92 89 87   * 104* 131*   * 137 132*   POTASSIUM 3.7 4.5 4.2   CHLORIDE 114* 105 96   CO2 28 27 26   BUN 9 14 20   CR 0.86 0.84 0.80   ANIONGAP 7 5 10   STEVE 8.8 8.2* 8.5   GLC 91 99 86   ALBUMIN  --   --  3.5   PROTTOTAL  --   --  8.1   BILITOTAL  --   --  0.2   ALKPHOS  --   --  107   ALT  --   --  26   AST  --   --  22   LIPASE  --  289  --        Recent Results (from the past 24 hour(s))   CT Chest/Abdomen/Pelvis w Contrast    Narrative    CT CHEST, ABDOMEN, AND PELVIS WITH CONTRAST May 16, 2018 12:19 PM     HISTORY: 57 year-old here with sepsis of unclear source. Possible  pneumonia. G-tube replaced by IR yesterday. Rule out intraabdominal  source.     COMPARISON:  March 2, 2018.    TECHNIQUE: Volumetric helical acquisition of CT images from the lung  apices through the symphysis pubis after the administration of 85mL  Isovue-370 intravenous contrast. Radiation dose for this scan was  reduced using automated exposure control, adjustment of the mA and/or  kV according to patient size, or iterative reconstruction technique.    FINDINGS:     Chest: Bibasilar infiltrates which are nonspecific. No pleural or  pericardial effusions. Normal caliber aorta. Normal heart size. No  adenopathy in the chest.    Abdomen and pelvis: The liver, spleen, adrenal glands, and kidneys  demonstrate no worrisome focal lesion. Stable cystic lesion in the  tail of the pancreas. There are no dilated loops of small intestine or  large bowel to suggest ileus or obstruction. No free fluid. No free  air. There are no lymph nodes that are abnormal by size criteria.  There is a foreign body in the region of the cecum, a portion of this  foreign body projects outside the lumen, perforation of the colon  could be the source of sepsis. This foreign body was present in March  and January. GJ tube noted with tip in the proximal jejunum. No small  bowel obstruction.    Survey of the visualized bony structures demonstrates no destructive  bony lesions.      Impression    IMPRESSION:  1. The foreign body in the ascending colon/cecum likely representing a  migrated G-tube projects at least partially outside the lumen,  perforation of the ascending colon/cecum is a potential source of  sepsis.  2. Bibasilar infiltrates in the lungs compatible with pneumonia.    BERT ESPINOZA MD

## 2018-05-17 NOTE — PLAN OF CARE
Problem: Patient Care Overview  Goal: Discharge Needs Assessment  Outcome: No Change  Unable to assess orientation. Calm and cooperative throughout shift. VSS. Nonverbal pain scale utilized, some pain noted with palpitation to abdomen. Right sided heipelegia. Nonproductive cough noted. NPO, GJ tube intact. No loose stools. Incontinent of urine. Turn and repo every 2 hours. Continue IV antibiotics.

## 2018-05-17 NOTE — PLAN OF CARE
Problem: Patient Care Overview  Goal: Plan of Care/Patient Progress Review  Pt is alert, cooperative. VSS denies pain when asked (shakes head). Pt is non-verbal. Na 149, NS changed to D5 with 20K @ 125ml/hr. IV fluids D/C. Pt starting TF tonight. Pt tolerating meds. Turned and repositioned q2. Inc of B&B.

## 2018-05-17 NOTE — CONSULTS
[facility]  General Surgery Consultation         Mark Hummel MD    Keyon Farias MRN# [id number]   YOB: 1960 Age: 57 year old      Date of Admission:  5/15/2018  Date of Consult: 5/17/2018         Assessment and Plan:   57-year-old gentleman with traumatic brain injury and complex medical and surgical history presents with recurrent fevers and sepsis.  CT scan of the abdomen revealed a foreign body in the right colon near the hepatic flexure.  I think this is likely a previous stent used in drainage of a pancreatic fluid collection.  This has been present for at least one year.  do not see any evidence for acute perforation or abscess.  Patient may eat and this matter was discussed with the hospitalist team.  I recommend the patient follow-up with his GI team at [facility] for consideration of possible colonoscopy to remove the foreign body.  Given the patient's previous pancreatic necrosis and multiple abdominal stent placements, abdominal surgery for foreign body removal or colon resection would likely be very challenging and carry significant morbidity.    Surgical co-morbities include previous traumatic brain injury, previous pancreatic necrosis.            Code Status:   Full Code         Primary Care Physician:   Carlos Gomez [phone]         Requesting Physician:      Leticia Santiago MD         Chief Complaint:   Fevers, leukocytosis    History is obtained from the patient         History of Present Illness:   Keyon Farias is a 57 year old male who presented with recurrent fevers and possible bacteremia.  Patient has a traumatic brain injury and is given tube feeds for his nutrition.  He has been admitted currently with probable aspiration pneumonia.  Upon evaluation during this hospital stay CT scan was obtained of the abdomen and pelvis.  Abdominal CT scan was noted for a foreign body that appeared consistent with a drain or feeding tube near the  hepatic flexure of the colon.  We are asked to weigh in on the significance of this.           Past Medical History:   Keyon Farias  has a past medical history of 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); DVT of upper extremity (deep vein thrombosis) (H); Gastro-oesophageal reflux disease; Panhypopituitarism (H); Pneumonia; Seizures (H); Septic shock (H); Spastic hemiplegia affecting dominant side (H); Thyroid disease; Tracheostomy care (H); Traumatic brain injury (H) (1989); Unspecified cerebral artery occlusion with cerebral infarction (1989); UTI (urinary tract infection); Ventricular fibrillation (H); and Ventricular tachyarrhythmia (H).         Past Surgical History:   Keyon Farias  has a past surgical history that includes orthopedic surgery; Head and neck surgery; vascular surgery; Laparoscopic appendectomy (7/30/2013); Esophagoscopy, gastroscopy, duodenoscopy (EGD), combined (3/13/2014); Tracheostomy (N/A, 9/3/2016); Laparoscopic assisted insertion tube gastrotomy (N/A, 9/7/2016); Tracheostomy (N/A, 12/2/2016); Esophagoscopy, gastroscopy, duodenoscopy (EGD), combined (N/A, 12/6/2016); Endoscopic ultrasound upper gastrointestinal tract (GI) (N/A, 1/30/2017); Endoscopic Ultrasound, Esophagoscopy, Gastroscopy, Duodenoscopy (Egd), Necrosectomy (N/A, 2/7/2017); and Esophagoscopy, gastroscopy, duodenoscopy (EGD), combined (N/A, 2/7/2017).         Home Medications:     Prior to Admission medications    Medication Sig Last Dose Taking? Auth Provider   ACETAMINOPHEN  mg by Per Feeding Tube route every 4 hours as needed for pain 5/15/2018 at Unknown time Yes Unknown, Entered By History   albuterol (2.5 MG/3ML) 0.083% neb solution Take 1 vial by nebulization every 4 hours as needed for shortness of breath / dyspnea or wheezing 5/15/2018 at Unknown time Yes Unknown, Entered By History   amoxicillin-clavulanate (AUGMENTIN) 875-125 MG per tablet 1 tablet by Per J Tube route 2 times daily 5/15/2018 at  Unknown time Yes Cele Maldonado MD   aspirin 10 mg/mL 8.1 mLs (81 mg) by Per J Tube route daily 5/15/2018 at Unknown time Yes Mariana Venegas MD   bacitracin ointment Apply topically daily as needed for wound care To PEG site.  Yes Unknown, Entered By History   bisacodyl (DULCOLAX) 10 MG Suppository Place 1 suppository (10 mg) rectally daily as needed for constipation  Yes Mariana Venegas MD   Brivaracetam (BRIVIACT) 10 MG/ML solution Take 100 mg by mouth 2 times daily 5/15/2018 at Unknown time Yes Reported, Patient   calcium carbonate 1250 (500 CA) MG/5ML SUSP suspension 5 mLs (1,250 mg) by Per J Tube route 3 times daily (with meals) 5/15/2018 at Unknown time Yes Mariana Venegas MD   carBAMazepine (TEGRETOL) 100 MG/5ML suspension Take 150 mg by mouth every 6 hours 5/15/2018 at Unknown time Yes Unknown, Entered By History   fiber modular (NUTRISOURCE FIBER) packet 1 packet by Per Feeding Tube route 3 times daily 5/15/2018 at Unknown time Yes Mariana Venegas MD   hydrocortisone (CORTEF) 2 mg/mL 10 mLs (20 mg) by Per J Tube route every morning 5/15/2018 at Unknown time Yes Mariana Venegas MD   hydrocortisone (CORTEF) 2 mg/mL Take 5 mLs (10 mg) by mouth every evening 5/15/2018 at Unknown time Yes Mariana Venegas MD   levothyroxine (SYNTHROID) 25 mcg/mL 6 mLs (150 mcg) by Per J Tube route every morning (before breakfast)  Patient taking differently: 150 mcg by Per J Tube route every morning (before breakfast) Hold tube feeding 1 hour prior and 1 hour after medication administered. 5/15/2018 at Unknown time Yes Mariana Venegas MD   melatonin (MELATONIN) 1 MG/ML LIQD liquid 6 mg by Jejunal Tube route nightly as needed for sleep  Yes Unknown, Entered By History   multivitamins with minerals (CERTAVITE/CEROVITE) LIQD liquid 15 mLs by Per J Tube route daily 5/15/2018 at Unknown time Yes Mariana Venegas MD   pantoprazole (PROTONIX) SUSP suspension  Take 10 mg by mouth daily 5/15/2018 at Unknown time Yes Unknown, Entered By History   pantoprazole (PROTONIX) SUSP suspension Take 10 mg by mouth every evening 5/15/2018 at Unknown time Yes Unknown, Entered By History   potassium & sodium phosphates (NEUTRA-PHOS) 280-160-250 MG Packet Take 1 packet by mouth 3 times daily 0900, 1500, 2100.  5/15/2018 at Unknown time Yes Unknown, Entered By History   testosterone cypionate (DEPOTESTOTERONE CYPIONATE) 200 MG/ML injection Inject 100 mg into the muscle See Admin Instructions Every 2 weeks. was due 5/14 Yes Unknown, Entered By History   VITAMIN D, CHOLECALCIFEROL, PO Take 4,000 Units by mouth  5/15/2018 at Unknown time Yes Unknown, Entered By History            Current Medications:           aspirin  81 mg Per J Tube Daily     Brivaracetam  100 mg Oral BID     carBAMazepine  150 mg Oral Once     carBAMazepine  150 mg Oral Q6H     fiber modular  1 packet Per Feeding Tube TID     heparin  5,000 Units Subcutaneous Q12H     hydrocortisone  10 mg Per J Tube QPM     [START ON 5/18/2018] hydrocortisone  20 mg Per J Tube QAM     levothyroxine  150 mcg Per J Tube QAM AC     multivitamins with minerals  15 mL Per J Tube Daily     pantoprazole  10 mg Oral BID     piperacillin-tazobactam  4.5 g Intravenous Q6H     potassium & sodium phosphates  1 packet Oral TID     sodium chloride (PF)  3 mL Intracatheter Q8H     sodium chloride (PF)  3 mL Intracatheter Q8H     testosterone cypionate  100 mg Intramuscular Once     triamcinolone   Topical BID     sodium chloride 0.9%, acetaminophen (TYLENOL) tablet 650 mg, albuterol, bisacodyl, lidocaine 4%, lidocaine (buffered or not buffered), magnesium sulfate, melatonin, naloxone, ondansetron **OR** ondansetron, oxyCODONE-acetaminophen, polyethylene glycol, potassium chloride, potassium chloride with lidocaine, potassium chloride, potassium chloride, potassium chloride, sodium chloride (PF), sodium chloride (PF)         Allergies:     Allergies    Allergen Reactions     Dilantin [Phenytoin Sodium]      Valproic Acid      Toxicity c bone marrow suspension, elevated ammonia levels             Social History:   Keyon Farias  reports that he quit smoking about 29 years ago. He has never used smokeless tobacco. He reports that he does not drink alcohol or use illicit drugs.          Family History:   Keyon Farias family history is not on file.          Review of Systems:   The 10 point Review of Systems is negative other than noted in the HPI.  Given the patient's traumatic brain injury, he was limited in his participation of the review of systems.           Physical Exam:   Blood pressure 130/51, pulse 111, temperature 97.8  F (36.6  C), temperature source Axillary, resp. rate 16, weight 75.3 kg (166 lb), SpO2 99 %.  166 lbs 0 oz    Pleasant mentally challenged gentleman in no distress.  Patient has a pleasant affect.  Pupils equal round and reactive to light.   No cervical lymphadenopathy or thyromegaly.   Lung fields clear, breathing comfortably.   Heart normal sinus rhythm.  No murmurs rubs or gallops.  Abdomen soft, nontender, nondistended.  No abdominal pain or hernias.  Well-healed upper midline scar.  Skin warm, dry.  No obvious rashes or lesions.           Data:   All new lab and imaging data was reviewed.   Spent significant time reviewing previous GI clinic notes and operative reports.  Also went over several of the patient's previous CT scans.  Recent Labs   Lab Test  05/17/18   0645  05/16/18   0655   02/07/17   0452   01/30/17   0340   WBC  9.0  18.3*   < >  11.7*   < >  10.7   HGB  11.9*  11.8*   < >  9.4*   < >  7.6*   MCV  92  89   < >  82   < >  81   PLT  116*  104*   < >  363   < >  163   INR   --    --    --   1.27*   --   1.32*    < > = values in this interval not displayed.      Recent Labs   Lab Test  05/17/18   1145  05/17/18   0645  05/16/18   0655   NA  146*  149*  137   POTASSIUM   --   3.7  4.5   CHLORIDE   --   114*  105   CO2   --    28  27   BUN   --   9  14   CR   --   0.86  0.84   ANIONGAP   --   7  5   STEVE   --   8.8  8.2*   GLC   --   91  99

## 2018-05-18 LAB
ANION GAP SERPL CALCULATED.3IONS-SCNC: 9 MMOL/L (ref 3–14)
BUN SERPL-MCNC: 12 MG/DL (ref 7–30)
CALCIUM SERPL-MCNC: 8.6 MG/DL (ref 8.5–10.1)
CHLORIDE SERPL-SCNC: 109 MMOL/L (ref 94–109)
CO2 SERPL-SCNC: 27 MMOL/L (ref 20–32)
CREAT SERPL-MCNC: 0.88 MG/DL (ref 0.66–1.25)
ERYTHROCYTE [DISTWIDTH] IN BLOOD BY AUTOMATED COUNT: 14.3 % (ref 10–15)
GFR SERPL CREATININE-BSD FRML MDRD: >90 ML/MIN/1.7M2
GLUCOSE SERPL-MCNC: 100 MG/DL (ref 70–99)
HCT VFR BLD AUTO: 36 % (ref 40–53)
HGB BLD-MCNC: 11.9 G/DL (ref 13.3–17.7)
MCH RBC QN AUTO: 30.1 PG (ref 26.5–33)
MCHC RBC AUTO-ENTMCNC: 33.1 G/DL (ref 31.5–36.5)
MCV RBC AUTO: 91 FL (ref 78–100)
PLATELET # BLD AUTO: 114 10E9/L (ref 150–450)
POTASSIUM SERPL-SCNC: 3.5 MMOL/L (ref 3.4–5.3)
RBC # BLD AUTO: 3.96 10E12/L (ref 4.4–5.9)
SODIUM SERPL-SCNC: 145 MMOL/L (ref 133–144)
WBC # BLD AUTO: 7 10E9/L (ref 4–11)

## 2018-05-18 PROCEDURE — 85027 COMPLETE CBC AUTOMATED: CPT | Performed by: INTERNAL MEDICINE

## 2018-05-18 PROCEDURE — 27210429 ZZH NUTRITION PRODUCT INTERMEDIATE LITER

## 2018-05-18 PROCEDURE — A9270 NON-COVERED ITEM OR SERVICE: HCPCS | Mod: GY | Performed by: INTERNAL MEDICINE

## 2018-05-18 PROCEDURE — 25000132 ZZH RX MED GY IP 250 OP 250 PS 637: Mod: GY | Performed by: INTERNAL MEDICINE

## 2018-05-18 PROCEDURE — 12000000 ZZH R&B MED SURG/OB

## 2018-05-18 PROCEDURE — 99232 SBSQ HOSP IP/OBS MODERATE 35: CPT | Performed by: INTERNAL MEDICINE

## 2018-05-18 PROCEDURE — 25000128 H RX IP 250 OP 636: Performed by: INTERNAL MEDICINE

## 2018-05-18 PROCEDURE — 80048 BASIC METABOLIC PNL TOTAL CA: CPT | Performed by: INTERNAL MEDICINE

## 2018-05-18 PROCEDURE — 27210995 ZZH RX 272: Performed by: INTERNAL MEDICINE

## 2018-05-18 PROCEDURE — 36415 COLL VENOUS BLD VENIPUNCTURE: CPT | Performed by: INTERNAL MEDICINE

## 2018-05-18 RX ADMIN — TRIAMCINOLONE ACETONIDE: 5 CREAM TOPICAL at 21:48

## 2018-05-18 RX ADMIN — CARBAMAZEPINE 150 MG: 100 SUSPENSION ORAL at 06:03

## 2018-05-18 RX ADMIN — CARBAMAZEPINE 150 MG: 100 SUSPENSION ORAL at 17:30

## 2018-05-18 RX ADMIN — CARBAMAZEPINE 150 MG: 100 SUSPENSION ORAL at 09:30

## 2018-05-18 RX ADMIN — PANTOPRAZOLE SODIUM 10 MG: 40 TABLET, DELAYED RELEASE ORAL at 22:14

## 2018-05-18 RX ADMIN — TRIAMCINOLONE ACETONIDE: 5 CREAM TOPICAL at 11:11

## 2018-05-18 RX ADMIN — POTASSIUM & SODIUM PHOSPHATES POWDER PACK 280-160-250 MG 1 PACKET: 280-160-250 PACK at 09:30

## 2018-05-18 RX ADMIN — AMOXICILLIN AND CLAVULANATE POTASSIUM 500 MG: 400; 57 POWDER, FOR SUSPENSION ORAL at 09:30

## 2018-05-18 RX ADMIN — HYDROCORTISONE 20 MG: 10 TABLET ORAL at 09:31

## 2018-05-18 RX ADMIN — POTASSIUM & SODIUM PHOSPHATES POWDER PACK 280-160-250 MG 1 PACKET: 280-160-250 PACK at 17:30

## 2018-05-18 RX ADMIN — PIPERACILLIN SODIUM,TAZOBACTAM SODIUM 4.5 G: 4; .5 INJECTION, POWDER, FOR SOLUTION INTRAVENOUS at 02:29

## 2018-05-18 RX ADMIN — BRIVARACETAM 100 MG: 10 SOLUTION ORAL at 21:48

## 2018-05-18 RX ADMIN — Medication 1 PACKET: at 17:30

## 2018-05-18 RX ADMIN — BRIVARACETAM 100 MG: 10 SOLUTION ORAL at 11:11

## 2018-05-18 RX ADMIN — Medication 1 PACKET: at 21:47

## 2018-05-18 RX ADMIN — POTASSIUM & SODIUM PHOSPHATES POWDER PACK 280-160-250 MG 1 PACKET: 280-160-250 PACK at 21:47

## 2018-05-18 RX ADMIN — PANTOPRAZOLE SODIUM 10 MG: 40 TABLET, DELAYED RELEASE ORAL at 09:30

## 2018-05-18 RX ADMIN — HYDROCORTISONE 10 MG: 10 TABLET ORAL at 21:47

## 2018-05-18 RX ADMIN — MULTIVITAMIN 15 ML: LIQUID ORAL at 09:30

## 2018-05-18 RX ADMIN — CARBAMAZEPINE 150 MG: 100 SUSPENSION ORAL at 21:47

## 2018-05-18 RX ADMIN — Medication 1 PACKET: at 09:29

## 2018-05-18 RX ADMIN — HEPARIN SODIUM 5000 UNITS: 5000 INJECTION, SOLUTION INTRAVENOUS; SUBCUTANEOUS at 06:03

## 2018-05-18 RX ADMIN — AMOXICILLIN AND CLAVULANATE POTASSIUM 500 MG: 400; 57 POWDER, FOR SUSPENSION ORAL at 17:30

## 2018-05-18 RX ADMIN — HEPARIN SODIUM 5000 UNITS: 5000 INJECTION, SOLUTION INTRAVENOUS; SUBCUTANEOUS at 17:30

## 2018-05-18 RX ADMIN — LEVOTHYROXINE SODIUM 150 MCG: 150 TABLET ORAL at 06:04

## 2018-05-18 RX ADMIN — PANTOPRAZOLE SODIUM 10 MG: 40 TABLET, DELAYED RELEASE ORAL at 00:05

## 2018-05-18 RX ADMIN — ASPIRIN 81 MG 81 MG: 81 TABLET ORAL at 09:31

## 2018-05-18 NOTE — PROGRESS NOTES
Surgery  Pt still without abdomen pain  I think re-evaluation by GI team at the [facility] is appropriate next step for consideration of colonoscopy.  May resume tube feeds.  No current surgical needs.  Call with questions.  David Hummel MD  General Surgery, Office [phone]

## 2018-05-18 NOTE — ANESTHESIA CARE TRANSFER NOTE
Patient: Keyon Farias    * No procedures listed *    Diagnosis: * No pre-op diagnosis entered *  Diagnosis Additional Information: No value filed.    Anesthesia Type:   No value filed.     Note:  Airway :Room Air  Patient transferred to:Medical/Surgical Unit  Handoff Report: Identifed the Patient, Identified the Reponsible Provider, Reviewed the pertinent medical history, Discussed the surgical course, Reviewed Intra-OP anesthesia mangement and issues during anesthesia, Set expectations for post-procedure period and Allowed opportunity for questions and acknowledgement of understanding      Vitals: (Last set prior to Anesthesia Care Transfer)              Electronically Signed By: NATHANIEL Neal CRNA  May 17, 2018  9:20 PM

## 2018-05-18 NOTE — PROGRESS NOTES
[facility]    Hospitalist Progress Note    Assessment & Plan   Keyon Farias is a 57 year old male with a past medical hx of TBI, aphasia, dysphagia with g-tube, seizure disorder, panhypopituitarism and recurrent admissions with sepsis and aspiration PNA. He was admitted in Feb with sepsis secondary to right lower lobe pneumonia and in April with sepsis of unclear source. His most recent admission was earlier this month from 5/1-5/5 with sepsis 2/2 Asp Pna. He was discharged home on 5/5 with 6 days if Augmentin. He lives with his mother who cares for him and had been doing well per her report up until the day of presentation when he developed sudden onset of shaking chills, fever of 102.       Recurrent Sepsis likely secondary to aspiration PNA   CT chest abdomen pelvis on 5/16: an apparent migrated G-tube projects at least partially outside the lumen of the ascending colon,suggesting possible perforation as source of sepsis. Also showed bilateral infiltrates compatible with PNA/ He is at risk for recurrent aspiration PNA but no clear sx to suggest acute PNA on presentations no new cough or hypoxia.  -Started on Zosyn, patient has been afebrile and doing well, so switched to PO Augmentin today  Per surgery-  Foreign body on abdominal CT is likely from retained stent placed for drainage necrotic pancreatitis, same in same location on prior films. No evidence of acute perforation.  Patient to follow up with [facility] gastroenterology, Dr. Meza regarding removal, possibly by colonoscopy.    Dysphagia, on TF   Question recurring aspiration with TF's, leading to recurring presentations for sepsis. G-Tube replaced 5/15/18.   - HOB up 30 degrees with TF via J tube.   - Resume TF's on 05/17/18   - Underwent a recent video swallow after his recent admission and continued NPO was advised.    - Hold on further trials with speech therapy at this time.    -Updated mother and patient, both are agreeable to hold  off on further oral intake until further evaluation by speech therapy as an outpatient    Hypernatremia   -Increase tube feeding flushes to 150 cc every 3 hour, monitor intermittently as an outpatient        Panhypopituitarism    - Convert stress dose steroids > PTA doses of hydrocortisone on 05/17/18.   - Continue levothyroxine.   - Resume q 2 weeks Testosterone injections. Mother is concerned as he is overdue. Mother can bring from vial from home for pharmacy to verify before administration.       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      Mild thrombocytopenia secondary to sepsis - periodic monitoring     H/o necrotizing pancreatitis  Abdominal exam benign, no evidence of pancreatitis on CT. Nl lipase.       Chronic pruritis   - Continue PTA triamcinolone ointment BID to areas of itching.    Pain assessment :  Current Pain Score 5/16/2018   Patient currently in pain? denies   Pain score (0-10) -   Pain location -   Pain descriptors -   rFLACC pain score -   CPOT pain score -   Keyon liriano pain level was assessed and he currently denies pain.      DVT Prophylaxis: Heparin SQ  Code Status: Full Code    Disposition: Expected discharge in 1 days once continued clinical improvement.    Pamela Soliz MD  Text page (7am - 6pm)    Interval History   Patient without any new complaints.  No new nursing concerns.  Discussed with mother at bedside.  She is worried about his recurrent presentation with aspiration.    -Data reviewed today: I reviewed all new labs and imaging results over the last 24 hours. I personally reviewed no images or EKG's today.    Physical Exam   Temp: 96.7  F (35.9  C) Temp src: Axillary BP: 105/55   Heart Rate: 70 Resp: 16 SpO2: 98 % O2 Device: None (Room air)    Vitals:    05/17/18 0535 05/18/18 0611   Weight: 75.3 kg (166 lb) 72.8 kg (160 lb 6.4 oz)     Vital Signs with Ranges  Temp:  [96  F (35.6  C)-97.6  F (36.4  C)] 96.7  F (35.9  C)  Heart Rate:  [47-70]  70  Resp:  [14-16] 16  BP: (105-131)/(47-55) 105/55  SpO2:  [97 %-100 %] 98 %  I/O last 3 completed shifts:  In: 1209.17 [I.V.:829.17; NG/GT:380]  Out: -     Exam:  Constitutional: Awake, alert and no distress. Appears comfortable  Head: Normocephalic. No masses, lesions, tenderness or abnormalities  ENT: no neck nodes or sinus tenderness  Cardiovascular: RRR.  2+ murmurs, no rub or gallop no JVD  Respiratory: Normal WOB, no wheezes or crackles   Gastrointestinal: Abdomen soft, non-tender, G-tube in place. BS normal. No masses, organomegaly  : Deferred  Extremities: Minimal bilateral edema , no clubbing /cyanosis   Skin: Warm and dry, no rash        Medications       amoxicillin-clavulanate  500 mg Oral Q8H     aspirin  81 mg Per J Tube Daily     Brivaracetam  100 mg Oral BID     carBAMazepine  150 mg Oral Once     carBAMazepine  150 mg Oral Q6H     fiber modular  1 packet Per Feeding Tube TID     heparin  5,000 Units Subcutaneous Q12H     hydrocortisone  10 mg Per J Tube QPM     hydrocortisone  20 mg Per J Tube QAM     levothyroxine  150 mcg Per J Tube QAM AC     multivitamins with minerals  15 mL Per J Tube Daily     pantoprazole  10 mg Oral BID     potassium & sodium phosphates  1 packet Oral TID     sodium chloride (PF)  3 mL Intracatheter Q8H     sodium chloride (PF)  3 mL Intracatheter Q8H     testosterone cypionate  100 mg Intramuscular Once     triamcinolone   Topical BID       Data     Recent Labs  Lab 05/18/18  0756 05/17/18  1145 05/17/18  0645 05/16/18  0655 05/15/18  2237   WBC 7.0  --  9.0 18.3* 16.7*   HGB 11.9*  --  11.9* 11.8* 13.8   MCV 91  --  92 89 87   *  --  116* 104* 131*   * 146* 149* 137 132*   POTASSIUM 3.5  --  3.7 4.5 4.2   CHLORIDE 109  --  114* 105 96   CO2 27  --  28 27 26   BUN 12  --  9 14 20   CR 0.88  --  0.86 0.84 0.80   ANIONGAP 9  --  7 5 10   STEVE 8.6  --  8.8 8.2* 8.5   *  --  91 99 86   ALBUMIN  --   --   --   --  3.5   PROTTOTAL  --   --   --   --  8.1    BILITOTAL  --   --   --   --  0.2   ALKPHOS  --   --   --   --  107   ALT  --   --   --   --  26   AST  --   --   --   --  22   LIPASE  --   --   --  289  --        Imaging:  No results found for this or any previous visit (from the past 24 hour(s)).

## 2018-05-18 NOTE — PLAN OF CARE
Problem: Patient Care Overview  Goal: Plan of Care/Patient Progress Review  Outcome: No Change  Patient alert, britt orientation, pt nonverbal. VSS on RA. Restarted tube feeding on eves, tolerating well at goal rate. Faint, fine crackles in LL, otherwise lung sounds diminished. Blanchable redness to coccyx, turned and repositioned q2h. Pt strict NPO. Up with lift. Plan on discharge to home today with follow up appointment with [facility] for colonoscopy as outpatient.

## 2018-05-18 NOTE — PROGRESS NOTES
Chart reviewed  TF started last evening - homero goal rate (Isosource 1.5 @ 60 mL/hr, water flushes of 60 mL every 4 hrs)  5/18: Na 145 (H)  Note that IVF has been dc'd    PLAN:  Will increase water flushes to 120 mL every 3 hrs (960 mL).  TF + flushes = 2054 mL/day.    Aurora Yousif RD, LD  Clinical Dietitian - [facility]  Pager - [phone]

## 2018-05-19 VITALS
DIASTOLIC BLOOD PRESSURE: 59 MMHG | TEMPERATURE: 97.6 F | BODY MASS INDEX: 21.46 KG/M2 | HEART RATE: 111 BPM | SYSTOLIC BLOOD PRESSURE: 102 MMHG | WEIGHT: 158.2 LBS | OXYGEN SATURATION: 96 % | RESPIRATION RATE: 16 BRPM

## 2018-05-19 LAB — PLATELET # BLD AUTO: 126 10E9/L (ref 150–450)

## 2018-05-19 PROCEDURE — 25000128 H RX IP 250 OP 636: Performed by: INTERNAL MEDICINE

## 2018-05-19 PROCEDURE — 25000132 ZZH RX MED GY IP 250 OP 250 PS 637: Mod: GY | Performed by: INTERNAL MEDICINE

## 2018-05-19 PROCEDURE — 99239 HOSP IP/OBS DSCHRG MGMT >30: CPT | Performed by: INTERNAL MEDICINE

## 2018-05-19 PROCEDURE — A9270 NON-COVERED ITEM OR SERVICE: HCPCS | Mod: GY | Performed by: INTERNAL MEDICINE

## 2018-05-19 PROCEDURE — 85049 AUTOMATED PLATELET COUNT: CPT | Performed by: INTERNAL MEDICINE

## 2018-05-19 PROCEDURE — 27210995 ZZH RX 272: Performed by: INTERNAL MEDICINE

## 2018-05-19 PROCEDURE — 36415 COLL VENOUS BLD VENIPUNCTURE: CPT | Performed by: INTERNAL MEDICINE

## 2018-05-19 RX ORDER — AMOXICILLIN AND CLAVULANATE POTASSIUM 400; 57 MG/5ML; MG/5ML
500 POWDER, FOR SUSPENSION ORAL EVERY 8 HOURS
Qty: 94.5 ML | Refills: 0 | Status: SHIPPED | OUTPATIENT
Start: 2018-05-19 | End: 2019-01-31

## 2018-05-19 RX ADMIN — TRIAMCINOLONE ACETONIDE: 5 CREAM TOPICAL at 08:13

## 2018-05-19 RX ADMIN — ASPIRIN 81 MG 81 MG: 81 TABLET ORAL at 08:04

## 2018-05-19 RX ADMIN — AMOXICILLIN AND CLAVULANATE POTASSIUM 500 MG: 400; 57 POWDER, FOR SUSPENSION ORAL at 08:05

## 2018-05-19 RX ADMIN — BRIVARACETAM 100 MG: 10 SOLUTION ORAL at 09:39

## 2018-05-19 RX ADMIN — AMOXICILLIN AND CLAVULANATE POTASSIUM 500 MG: 400; 57 POWDER, FOR SUSPENSION ORAL at 01:02

## 2018-05-19 RX ADMIN — CARBAMAZEPINE 150 MG: 100 SUSPENSION ORAL at 03:36

## 2018-05-19 RX ADMIN — PANTOPRAZOLE SODIUM 10 MG: 40 TABLET, DELAYED RELEASE ORAL at 08:05

## 2018-05-19 RX ADMIN — HYDROCORTISONE 20 MG: 10 TABLET ORAL at 08:04

## 2018-05-19 RX ADMIN — MULTIVITAMIN 15 ML: LIQUID ORAL at 08:05

## 2018-05-19 RX ADMIN — CARBAMAZEPINE 150 MG: 100 SUSPENSION ORAL at 10:34

## 2018-05-19 RX ADMIN — POTASSIUM & SODIUM PHOSPHATES POWDER PACK 280-160-250 MG 1 PACKET: 280-160-250 PACK at 08:04

## 2018-05-19 RX ADMIN — LEVOTHYROXINE SODIUM 150 MCG: 150 TABLET ORAL at 05:52

## 2018-05-19 RX ADMIN — HEPARIN SODIUM 5000 UNITS: 5000 INJECTION, SOLUTION INTRAVENOUS; SUBCUTANEOUS at 05:51

## 2018-05-19 NOTE — PROGRESS NOTES
Patient discharged to home today with resumption of Home Care through [facility]. Spoke with [facility] ([phone]) to inform them patient was discharged today and that orders will be faxed to them at Fax # [phone]. No further discharge needs at this time.

## 2018-05-19 NOTE — PLAN OF CARE
Problem: Patient Care Overview  Goal: Plan of Care/Patient Progress Review  Outcome: No Change  Pt is alert. Unable to assess orientation, pt is nonverbal. Will moan and cry out during cares. Appears to be comfortable when resting. VSS. NPO. Frequent oral cared encouraged. Tube feeding running through GJ tube at 60cc/hr with 150cc flushes q3hr. R sided hemiplegia. Incontinent. Turned/repositioned q2hr. Plan: transition to oral antibiotics and discharge home today with mother and with RN home care. Will continue to monitor.

## 2018-05-19 NOTE — DISCHARGE SUMMARY
[facility]    Discharge Summary  Hospitalist    Date of Admission:  5/15/2018  Date of Discharge:  5/19/2018 10:56 AM  Discharging Provider: Pamela Soliz MD    Discharge Diagnoses   Recurrent sepsis likely secondary to aspiration pneumonia  History of dysphasia  Hyponatremia  Panhypopituitarism  History of seizure disorder  Thrombocytopenia chronic  History of necrotizing pancreatitis  Chronic pruritus    History of Present Illness    Keyon Farias is a 57 year old male with a past medical hx of TBI, aphasia, dysphagia with g-tube, seizure disorder, panhypopituitarism and recurrent admissions with sepsis and aspiration PNA. He was admitted in Feb with sepsis secondary to right lower lobe pneumonia and in April with sepsis of unclear source. His most recent admission was earlier this month from 5/1-5/5 with sepsis 2/2 Asp Pna. He was discharged home on 5/5 with 6 days if Augmentin. He lives with his mother who cares for him and had been doing well per her report up until the day of presentation when he developed sudden onset of shaking chills, fever of 102.  Please see admission H&P for details    Hospital Course   Keyon Farias was admitted on 5/15/2018.  The following problems were addressed during his hospitalization:    Recurrent Sepsis likely secondary to aspiration PNA   CT chest abdomen pelvis on 5/16: an apparent migrated G-tube projects at least partially outside the lumen of the ascending colon,suggesting possible perforation as source of sepsis. Also showed bilateral infiltrates compatible with PNA/ He is at risk for recurrent aspiration PNA  -Started on Zosyn, patient remained afebrile and did well, so switched to PO Augmentin and monitored for another 24 hours.  He continued to improve.  Patient was discharged home with home health, advised to follow aspiration precautions  He was also noted to have  Foreign body on abdominal CT, surgery was consulted and per surgery opinion eat is likely  from retained stent placed for drainage necrotic pancreatitis, same in same location on prior films. No evidence of acute perforation.  Patient to follow up with [facility] gastroenterology, Dr. Meza regarding removal, possibly by colonoscopy.     Dysphagia, on TF   Question recurring aspiration with TF's, leading to recurring presentations for sepsis. G-Tube replaced 5/15/18.   - HOB up 30 degrees with TF via J tube.   - Resumed TF's on 05/17/18   - Underwent a recent video swallow after his recent admission and continued NPO was advised.    - Hold on further trials with speech therapy at this time.    -Updated mother and patient, both are agreeable to hold off on further oral intake until further evaluation by speech therapy as an outpatient     Hypernatremia   -Increased tube feeding flushes to 150 cc every 3 hour, monitor intermittently as an outpatient      Panhypopituitarism    - Convert stress dose steroids > PTA doses of hydrocortisone on 05/17/18.   - Continued levothyroxine.   - Resume q 2 weeks Testosterone injections.  No changes made to his chronic medication at the time of discharge      Seizure Disorder secondary to TBI  - Continue PTA brivaracetam, carbamazepine.       Mild thrombocytopenia secondary to sepsis - periodic monitoring      H/o necrotizing pancreatitis  Abdominal exam benign, no evidence of pancreatitis on CT. Nl lipase.       Chronic pruritis   - Continue PTA triamcinolone ointment BID to areas of itching.    # Discharge Pain Plan:   - Patient currently has NO PAIN and is not being prescribed pain medications on discharge.    Active Problems:    Fever      Pamela Soliz MD    Significant Results and Procedures   See below    Pending Results   These results will be followed up by PCP  Unresulted Labs Ordered in the Past 30 Days of this Admission     Date and Time Order Name Status Description    5/15/2018 2211 Blood culture Preliminary     5/15/2018 2211 Blood culture Preliminary            Code Status   Full Code       Primary Care Physician   Carlos Gomez    Physical Exam   Temp: 97.6  F (36.4  C) Temp src: Oral BP: 102/59   Heart Rate: 65 Resp: 16 SpO2: 96 % O2 Device: None (Room air)    Vitals:    05/17/18 0535 05/18/18 0611 05/19/18 0559   Weight: 75.3 kg (166 lb) 72.8 kg (160 lb 6.4 oz) 71.8 kg (158 lb 3.2 oz)     Vital Signs with Ranges  Temp:  [96.7  F (35.9  C)-97.6  F (36.4  C)] 97.6  F (36.4  C)  Heart Rate:  [65-81] 65  Resp:  [14-18] 16  BP: (102-129)/(49-59) 102/59  SpO2:  [96 %-98 %] 96 %  I/O last 3 completed shifts:  In: 340 [NG/GT:180]  Out: -     Exam:  Constitutional: Awake, alert and no distress. Appears comfortable  Head: Normocephalic. No masses, lesions, tenderness or abnormalities  ENT: ENT exam normal, no neck nodes or sinus tenderness  Cardiovascular: RRR.  No murmurs, no rubs or JVD  Respiratory: Normal WOB,b/l equal air entry, no wheezes or crackles   Gastrointestinal: Abdomen soft, G-tube in place, nontender. BS normal. No masses, organomegaly  : Deferred   extremities : Minimal bilateral edema , no clubbing or cyanosis.  Contractures in bilateral upper and lower extremity   Skin: Warm and dry, no rash    Discharge Disposition   Discharged to home  Condition at discharge: Stable    Consultations This Hospital Stay   PHYSICAL THERAPY ADULT IP CONSULT  SURGERY GENERAL IP CONSULT  NUTRITION SERVICES ADULT IP CONSULT  CARE COORDINATOR IP CONSULT    Time Spent on this Encounter   Pamela DYKES, personally saw the patient today and spent greater than 30 minutes discharging this patient.    Discharge Orders     Home care nursing referral     Home Care PT Referral for Hospital Discharge     Home Care OT Referral for Hospital Discharge     Home Care SLP Referral for Hospital Discharge     Reason for your hospital stay   Aspiration PNA     Activity   Your activity upon discharge: activity as tolerated     Follow-up and recommended labs and tests    Follow up with primary  care provider, Carlos Gomez, within 7 days for hospital follow- up.  The following labs/tests are recommended: CBC/BMP.  CXR in 4 weeks to document clearance  FU with Surgery at [facility] for possible colonoscopy and removal of the stent     Full Code     Diet   Follow this diet upon discharge: Orders Placed This Encounter     Adult Formula Drip Feeding: Continuous Isosource 1.5; Jejunostomy; Goal Rate: 60 ml/hr; mL/hr; Medication - Tube Feeding Flush Frequency: At least 15-30 mL water before and after medication administration and with tube clogging; resuming home tube...     NPO for Medical/Clinical Reasons Except for: No Exceptions       Discharge Medications   Current Discharge Medication List      START taking these medications    Details   amoxicillin-clavulanate (AUGMENTIN) 400-57 MG/5ML suspension 6.3 mLs (500 mg) by Per Feeding Tube route every 8 hours for 5 days  Qty: 94.5 mL, Refills: 0    Associated Diagnoses: Aspiration pneumonia, unspecified aspiration pneumonia type, unspecified laterality, unspecified part of lung (H)         CONTINUE these medications which have NOT CHANGED    Details   ACETAMINOPHEN  mg by Per Feeding Tube route every 4 hours as needed for pain      albuterol (2.5 MG/3ML) 0.083% neb solution Take 1 vial by nebulization every 4 hours as needed for shortness of breath / dyspnea or wheezing      aspirin 10 mg/mL 8.1 mLs (81 mg) by Per J Tube route daily    Associated Diagnoses: Routine adult health maintenance      bacitracin ointment Apply topically daily as needed for wound care To PEG site.      bisacodyl (DULCOLAX) 10 MG Suppository Place 1 suppository (10 mg) rectally daily as needed for constipation  Qty: 30 suppository    Associated Diagnoses: Constipation, unspecified constipation type      Brivaracetam (BRIVIACT) 10 MG/ML solution Take 100 mg by mouth 2 times daily      calcium carbonate 1250 (500 CA) MG/5ML SUSP suspension 5 mLs (1,250 mg) by Per J Tube route 3 times  daily (with meals)  Qty: 450 mL    Associated Diagnoses: Malnutrition (H)      carBAMazepine (TEGRETOL) 100 MG/5ML suspension Take 150 mg by mouth every 6 hours      fiber modular (NUTRISOURCE FIBER) packet 1 packet by Per Feeding Tube route 3 times daily    Associated Diagnoses: Necrotizing pancreatitis      !! hydrocortisone (CORTEF) 2 mg/mL 10 mLs (20 mg) by Per J Tube route every morning    Associated Diagnoses: Panhypopituitarism (H)      !! hydrocortisone (CORTEF) 2 mg/mL Take 5 mLs (10 mg) by mouth every evening    Associated Diagnoses: Panhypopituitarism (H)      levothyroxine (SYNTHROID) 25 mcg/mL 6 mLs (150 mcg) by Per J Tube route every morning (before breakfast)    Associated Diagnoses: Panhypopituitarism (H)      melatonin (MELATONIN) 1 MG/ML LIQD liquid 6 mg by Jejunal Tube route nightly as needed for sleep      multivitamins with minerals (CERTAVITE/CEROVITE) LIQD liquid 15 mLs by Per J Tube route daily    Associated Diagnoses: Necrotizing pancreatitis; Malnutrition (H)      !! pantoprazole (PROTONIX) SUSP suspension Take 10 mg by mouth daily      !! pantoprazole (PROTONIX) SUSP suspension Take 10 mg by mouth every evening      potassium & sodium phosphates (NEUTRA-PHOS) 280-160-250 MG Packet Take 1 packet by mouth 3 times daily 0900, 1500, 2100.       testosterone cypionate (DEPOTESTOTERONE CYPIONATE) 200 MG/ML injection Inject 100 mg into the muscle See Admin Instructions Every 2 weeks.      VITAMIN D, CHOLECALCIFEROL, PO Take 4,000 Units by mouth        !! - Potential duplicate medications found. Please discuss with provider.      STOP taking these medications       amoxicillin-clavulanate (AUGMENTIN) 875-125 MG per tablet Comments:   Reason for Stopping:             Allergies   Allergies   Allergen Reactions     Dilantin [Phenytoin Sodium]      Valproic Acid      Toxicity c bone marrow suspension, elevated ammonia levels      Data   Most Recent 3 CBC's:  Recent Labs   Lab Test  05/19/18   0630   05/18/18   0756  05/17/18   0645  05/16/18   0655   WBC   --   7.0  9.0  18.3*   HGB   --   11.9*  11.9*  11.8*   MCV   --   91  92  89   PLT  126*  114*  116*  104*      Most Recent 3 BMP's:  Recent Labs   Lab Test  05/18/18   0756  05/17/18   1145  05/17/18   0645  05/16/18   0655   NA  145*  146*  149*  137   POTASSIUM  3.5   --   3.7  4.5   CHLORIDE  109   --   114*  105   CO2  27   --   28  27   BUN  12   --   9  14   CR  0.88   --   0.86  0.84   ANIONGAP  9   --   7  5   STEVE  8.6   --   8.8  8.2*   GLC  100*   --   91  99     Most Recent 2 LFT's:  Recent Labs   Lab Test  05/15/18   2237  05/01/18   2019   AST  22  20   ALT  26  30   ALKPHOS  107  112   BILITOTAL  0.2  0.2     Most Recent INR's and Anticoagulation Dosing History:  Anticoagulation Dose History     Recent Dosing and Labs Latest Ref Rng & Units 12/1/2016 1/22/2017 1/22/2017 1/23/2017 1/25/2017 1/30/2017 2/7/2017    INR 0.86 - 1.14 1.30(H) 2.48(H) 2.58(H) 1.53(H) 1.49(H) 1.32(H) 1.27(H)        Most Recent 3 Troponin's:  Recent Labs   Lab Test  01/22/17   0500  01/21/17   2348  01/21/17   1600   TROPI  0.017  0.025  0.028     Most Recent Cholesterol Panel:  Recent Labs   Lab Test  11/29/16   0430   TRIG  100     Most Recent 6 Bacteria Isolates From Any Culture (See EPIC Reports for Culture Details):  Recent Labs   Lab Test  05/15/18   2323  05/15/18   2315  05/15/18   2237  05/01/18   2111 05/01/18 2054  05/01/18   2019   CULT  No growth  No growth after 3 days  No growth after 3 days  No growth  No growth  No growth     Most Recent TSH, T4 and A1c Labs:  Recent Labs   Lab Test  02/15/18   0610  01/29/17   0403  12/01/16   0400   TSH   --    --   <0.01*   T4   --   0.54*  0.73*   A1C  6.6*   --    --      Results for orders placed or performed during the hospital encounter of 05/15/18   XR Chest 2 Views    Narrative    XR CHEST 2 VW  5/15/2018 10:59 PM     INDICATION: Fever.    COMPARISON: 5/1/2018.      Impression    IMPRESSION: Shallow  inspiration. New mild left lower lung atelectasis  or infiltrate. Otherwise no significant change. Normal heart size.  Postoperative changes cervical spine.    PANFILO KING MD   CT Chest/Abdomen/Pelvis w Contrast    Narrative    CT CHEST, ABDOMEN, AND PELVIS WITH CONTRAST May 16, 2018 12:19 PM     HISTORY: 57 year-old here with sepsis of unclear source. Possible  pneumonia. G-tube replaced by IR yesterday. Rule out intraabdominal  source.     COMPARISON: March 2, 2018.    TECHNIQUE: Volumetric helical acquisition of CT images from the lung  apices through the symphysis pubis after the administration of 85mL  Isovue-370 intravenous contrast. Radiation dose for this scan was  reduced using automated exposure control, adjustment of the mA and/or  kV according to patient size, or iterative reconstruction technique.    FINDINGS:     Chest: Bibasilar infiltrates which are nonspecific. No pleural or  pericardial effusions. Normal caliber aorta. Normal heart size. No  adenopathy in the chest.    Abdomen and pelvis: The liver, spleen, adrenal glands, and kidneys  demonstrate no worrisome focal lesion. Stable cystic lesion in the  tail of the pancreas. There are no dilated loops of small intestine or  large bowel to suggest ileus or obstruction. No free fluid. No free  air. There are no lymph nodes that are abnormal by size criteria.  There is a foreign body in the region of the cecum, a portion of this  foreign body projects outside the lumen, perforation of the colon  could be the source of sepsis. This foreign body was present in March  and January. GJ tube noted with tip in the proximal jejunum. No small  bowel obstruction.    Survey of the visualized bony structures demonstrates no destructive  bony lesions.      Impression    IMPRESSION:  1. The foreign body in the ascending colon/cecum likely representing a  migrated G-tube projects at least partially outside the lumen,  perforation of the ascending colon/cecum is  a potential source of  sepsis.  2. Bibasilar infiltrates in the lungs compatible with pneumonia.    BERT ESPINOZA MD

## 2018-05-19 NOTE — PROGRESS NOTES
Discharge paperwork and meds gone over with mother, primary care provider. Belongings, paperwork and meds sent with mother. Transported down via wheelchair. Discharging home.

## 2018-05-19 NOTE — PROGRESS NOTES
Care Transition Initial Assessment -      Met with: mother  Active Problems:    Fever       DATA  Lives With: parent(s)   Patient is disabled and lives with his mother  Mother explains she and one other person are patient's PCA thru [facility].  She also reports receiving nursig and therapy services thru [facility] and service thru [facility].  She has all the necessary DME and feeding supplies at home.  Identified issues/concerns regarding health management:  Patient medically stable for d/c today.  Mother initially requested transportation however [employer] was not available till 1500.  Mother chose to transport patient home with the assistance of patient's second PCA.  [facility] and [facility] offices are not open today.  Writer will contact offices on Monday to fax resumption of orders.  Mother has the direct phone numbers for caregiver nurse and will contact him directly.    ASSESSMENT  Cognitive Status:  Per nursing notes, patient is alert ane non verbal  Concerns to be addressed: none   PLAN  Financial costs for the patient includes none, patient is on Med[employer]  Patient given options and choices for discharge yes  Patient/family is agreeable to the plan? Yes  Patient anticipates discharging to:  D/c back home with previous level of care

## 2018-05-19 NOTE — PLAN OF CARE
Problem: Patient Care Overview  Goal: Plan of Care/Patient Progress Review  Outcome: No Change  Unable to assess orientation, tends to cry out frequently during cares. Calm and cooperative throughout shift. VSS. Nonverbal pain scale utilized, no pain noted.  Right sided hemiplegia. Nonproductive cough noted, green sputum buildup in mouth, frequent oral cares as patient tolerates with swabs and suction. NPO, GJ tube intact with TF infusing 60cc/hr, free water flushes 150cc every 3 hours.  Incontinent of urine & stool. Had 1 large BM this shift. Turn and repo every 2 hours. Plan: transition to oral antibiotics and discharge home tomorrow with RN home care. Will continue to monitor.

## 2018-05-24 ENCOUNTER — CARE COORDINATION (OUTPATIENT)
Dept: GASTROENTEROLOGY | Facility: CLINIC | Age: 58
End: 2018-05-24

## 2018-05-24 NOTE — PROGRESS NOTES
Called and spoke to mom, she is advised per Dr. Diego: Will need clinic visit in future . No stents seen     She seemed to have more questions as well and advised her to schedule a clinic visit with Dr. Diego. Number given to her to schedule.     Keena FARIAS RN Coordinator  Dr. Marcus, Dr. Montana & Dr. Diego   [facility]  [phone]

## 2018-07-05 ENCOUNTER — HOSPITAL ENCOUNTER (INPATIENT)
Facility: CLINIC | Age: 58
LOS: 3 days | Discharge: HOME HEALTH | DRG: 871 | End: 2018-07-08
Attending: EMERGENCY MEDICINE | Admitting: INTERNAL MEDICINE
Payer: MEDICARE

## 2018-07-05 ENCOUNTER — APPOINTMENT (OUTPATIENT)
Dept: GENERAL RADIOLOGY | Facility: CLINIC | Age: 58
DRG: 871 | End: 2018-07-05
Attending: EMERGENCY MEDICINE
Payer: MEDICARE

## 2018-07-05 DIAGNOSIS — A41.9 SEVERE SEPSIS (H): ICD-10-CM

## 2018-07-05 DIAGNOSIS — J69.0 ASPIRATION PNEUMONIA OF RIGHT LOWER LOBE, UNSPECIFIED ASPIRATION PNEUMONIA TYPE (H): ICD-10-CM

## 2018-07-05 DIAGNOSIS — R65.20 SEVERE SEPSIS (H): ICD-10-CM

## 2018-07-05 DIAGNOSIS — Z76.89 HEALTH CARE HOME: Primary | ICD-10-CM

## 2018-07-05 DIAGNOSIS — J69.0 ASPIRATION PNEUMONIA (H): ICD-10-CM

## 2018-07-05 DIAGNOSIS — E23.0 PANHYPOPITUITARISM (H): ICD-10-CM

## 2018-07-05 LAB
ALBUMIN SERPL-MCNC: 3.3 G/DL (ref 3.4–5)
ALBUMIN UR-MCNC: 10 MG/DL
ALP SERPL-CCNC: 96 U/L (ref 40–150)
ALT SERPL W P-5'-P-CCNC: 28 U/L (ref 0–70)
AMORPH CRY #/AREA URNS HPF: ABNORMAL /HPF
ANION GAP SERPL CALCULATED.3IONS-SCNC: 9 MMOL/L (ref 3–14)
APPEARANCE UR: ABNORMAL
AST SERPL W P-5'-P-CCNC: 17 U/L (ref 0–45)
BASE EXCESS BLDV CALC-SCNC: 6.1 MMOL/L
BASOPHILS # BLD AUTO: 0.1 10E9/L (ref 0–0.2)
BASOPHILS NFR BLD AUTO: 0.4 %
BILIRUB SERPL-MCNC: 0.5 MG/DL (ref 0.2–1.3)
BILIRUB UR QL STRIP: NEGATIVE
BUN SERPL-MCNC: 15 MG/DL (ref 7–30)
CALCIUM SERPL-MCNC: 8.7 MG/DL (ref 8.5–10.1)
CHLORIDE SERPL-SCNC: 97 MMOL/L (ref 94–109)
CO2 SERPL-SCNC: 30 MMOL/L (ref 20–32)
COLOR UR AUTO: YELLOW
CREAT SERPL-MCNC: 0.87 MG/DL (ref 0.66–1.25)
DIFFERENTIAL METHOD BLD: ABNORMAL
EOSINOPHIL # BLD AUTO: 0.3 10E9/L (ref 0–0.7)
EOSINOPHIL NFR BLD AUTO: 2.1 %
ERYTHROCYTE [DISTWIDTH] IN BLOOD BY AUTOMATED COUNT: 13 % (ref 10–15)
GFR SERPL CREATININE-BSD FRML MDRD: >90 ML/MIN/1.7M2
GLUCOSE BLDC GLUCOMTR-MCNC: 96 MG/DL (ref 70–99)
GLUCOSE SERPL-MCNC: 101 MG/DL (ref 70–99)
GLUCOSE UR STRIP-MCNC: NEGATIVE MG/DL
HCO3 BLDV-SCNC: 32 MMOL/L (ref 21–28)
HCT VFR BLD AUTO: 40.9 % (ref 40–53)
HGB BLD-MCNC: 14.2 G/DL (ref 13.3–17.7)
HGB UR QL STRIP: NEGATIVE
IMM GRANULOCYTES # BLD: 0 10E9/L (ref 0–0.4)
IMM GRANULOCYTES NFR BLD: 0.2 %
KETONES UR STRIP-MCNC: NEGATIVE MG/DL
LACTATE BLD-SCNC: 2.6 MMOL/L (ref 0.7–2)
LACTATE BLD-SCNC: 2.8 MMOL/L (ref 0.7–2)
LACTATE BLD-SCNC: 3 MMOL/L (ref 0.4–1.9)
LACTATE BLD-SCNC: 4.6 MMOL/L (ref 0.7–2)
LACTATE SERPL-SCNC: 2.9 MMOL/L (ref 0.4–2)
LEUKOCYTE ESTERASE UR QL STRIP: ABNORMAL
LYMPHOCYTES # BLD AUTO: 2.6 10E9/L (ref 0.8–5.3)
LYMPHOCYTES NFR BLD AUTO: 20.9 %
MAGNESIUM SERPL-MCNC: 2 MG/DL (ref 1.6–2.3)
MCH RBC QN AUTO: 30.7 PG (ref 26.5–33)
MCHC RBC AUTO-ENTMCNC: 34.7 G/DL (ref 31.5–36.5)
MCV RBC AUTO: 89 FL (ref 78–100)
MONOCYTES # BLD AUTO: 0.8 10E9/L (ref 0–1.3)
MONOCYTES NFR BLD AUTO: 6.9 %
MUCOUS THREADS #/AREA URNS LPF: PRESENT /LPF
NEUTROPHILS # BLD AUTO: 8.5 10E9/L (ref 1.6–8.3)
NEUTROPHILS NFR BLD AUTO: 69.5 %
NITRATE UR QL: NEGATIVE
PCO2 BLDV: 50 MM HG (ref 40–50)
PH BLDV: 7.42 PH (ref 7.32–7.43)
PH UR STRIP: 7 PH (ref 5–7)
PHOSPHATE SERPL-MCNC: 1.9 MG/DL (ref 2.5–4.5)
PLATELET # BLD AUTO: 147 10E9/L (ref 150–450)
PO2 BLDV: 29 MM HG (ref 25–47)
POTASSIUM SERPL-SCNC: 4 MMOL/L (ref 3.4–5.3)
PROCALCITONIN SERPL-MCNC: 0.16 NG/ML
PROT SERPL-MCNC: 7.8 G/DL (ref 6.8–8.8)
RBC # BLD AUTO: 4.62 10E12/L (ref 4.4–5.9)
RBC #/AREA URNS AUTO: 1 /HPF (ref 0–2)
SODIUM SERPL-SCNC: 136 MMOL/L (ref 133–144)
SOURCE: ABNORMAL
SP GR UR STRIP: 1.02 (ref 1–1.03)
SQUAMOUS #/AREA URNS AUTO: <1 /HPF (ref 0–1)
T4 FREE SERPL-MCNC: 1.66 NG/DL (ref 0.76–1.46)
TSH SERPL DL<=0.005 MIU/L-ACNC: <0.01 MU/L (ref 0.4–4)
UROBILINOGEN UR STRIP-MCNC: 2 MG/DL (ref 0–2)
WBC # BLD AUTO: 12.2 10E9/L (ref 4–11)
WBC #/AREA URNS AUTO: 2 /HPF (ref 0–5)

## 2018-07-05 PROCEDURE — 40000986 XR CHEST PORT 1 VW

## 2018-07-05 PROCEDURE — 84443 ASSAY THYROID STIM HORMONE: CPT | Performed by: EMERGENCY MEDICINE

## 2018-07-05 PROCEDURE — 25000128 H RX IP 250 OP 636: Performed by: INTERNAL MEDICINE

## 2018-07-05 PROCEDURE — 83735 ASSAY OF MAGNESIUM: CPT | Performed by: EMERGENCY MEDICINE

## 2018-07-05 PROCEDURE — 25000132 ZZH RX MED GY IP 250 OP 250 PS 637: Mod: GY | Performed by: INTERNAL MEDICINE

## 2018-07-05 PROCEDURE — 82803 BLOOD GASES ANY COMBINATION: CPT | Performed by: EMERGENCY MEDICINE

## 2018-07-05 PROCEDURE — 12000000 ZZH R&B MED SURG/OB

## 2018-07-05 PROCEDURE — 76937 US GUIDE VASCULAR ACCESS: CPT

## 2018-07-05 PROCEDURE — 83605 ASSAY OF LACTIC ACID: CPT | Performed by: INTERNAL MEDICINE

## 2018-07-05 PROCEDURE — 96361 HYDRATE IV INFUSION ADD-ON: CPT

## 2018-07-05 PROCEDURE — 71046 X-RAY EXAM CHEST 2 VIEWS: CPT

## 2018-07-05 PROCEDURE — A9270 NON-COVERED ITEM OR SERVICE: HCPCS | Mod: GY | Performed by: INTERNAL MEDICINE

## 2018-07-05 PROCEDURE — A9270 NON-COVERED ITEM OR SERVICE: HCPCS | Mod: GY | Performed by: EMERGENCY MEDICINE

## 2018-07-05 PROCEDURE — 25000128 H RX IP 250 OP 636: Performed by: EMERGENCY MEDICINE

## 2018-07-05 PROCEDURE — 25000125 ZZHC RX 250: Performed by: EMERGENCY MEDICINE

## 2018-07-05 PROCEDURE — 87086 URINE CULTURE/COLONY COUNT: CPT | Performed by: EMERGENCY MEDICINE

## 2018-07-05 PROCEDURE — 96366 THER/PROPH/DIAG IV INF ADDON: CPT

## 2018-07-05 PROCEDURE — 99233 SBSQ HOSP IP/OBS HIGH 50: CPT | Performed by: INTERNAL MEDICINE

## 2018-07-05 PROCEDURE — 83605 ASSAY OF LACTIC ACID: CPT | Performed by: EMERGENCY MEDICINE

## 2018-07-05 PROCEDURE — 81001 URINALYSIS AUTO W/SCOPE: CPT | Performed by: EMERGENCY MEDICINE

## 2018-07-05 PROCEDURE — 25000132 ZZH RX MED GY IP 250 OP 250 PS 637: Mod: GY | Performed by: EMERGENCY MEDICINE

## 2018-07-05 PROCEDURE — 3E033XZ INTRODUCTION OF VASOPRESSOR INTO PERIPHERAL VEIN, PERCUTANEOUS APPROACH: ICD-10-PCS | Performed by: INTERNAL MEDICINE

## 2018-07-05 PROCEDURE — 84439 ASSAY OF FREE THYROXINE: CPT | Performed by: EMERGENCY MEDICINE

## 2018-07-05 PROCEDURE — 85025 COMPLETE CBC W/AUTO DIFF WBC: CPT | Performed by: EMERGENCY MEDICINE

## 2018-07-05 PROCEDURE — 00000146 ZZHCL STATISTIC GLUCOSE BY METER IP

## 2018-07-05 PROCEDURE — 96365 THER/PROPH/DIAG IV INF INIT: CPT

## 2018-07-05 PROCEDURE — P9047 ALBUMIN (HUMAN), 25%, 50ML: HCPCS | Performed by: INTERNAL MEDICINE

## 2018-07-05 PROCEDURE — 27210429 ZZH NUTRITION PRODUCT INTERMEDIATE LITER

## 2018-07-05 PROCEDURE — 84100 ASSAY OF PHOSPHORUS: CPT | Performed by: EMERGENCY MEDICINE

## 2018-07-05 PROCEDURE — 25000125 ZZHC RX 250: Performed by: INTERNAL MEDICINE

## 2018-07-05 PROCEDURE — 99291 CRITICAL CARE FIRST HOUR: CPT | Mod: 25

## 2018-07-05 PROCEDURE — 87040 BLOOD CULTURE FOR BACTERIA: CPT | Performed by: EMERGENCY MEDICINE

## 2018-07-05 PROCEDURE — 99207 ZZC APP CREDIT; MD BILLING SHARED VISIT: CPT | Performed by: INTERNAL MEDICINE

## 2018-07-05 PROCEDURE — 36556 INSERT NON-TUNNEL CV CATH: CPT

## 2018-07-05 PROCEDURE — 80053 COMPREHEN METABOLIC PANEL: CPT | Performed by: EMERGENCY MEDICINE

## 2018-07-05 PROCEDURE — 84145 PROCALCITONIN (PCT): CPT | Performed by: EMERGENCY MEDICINE

## 2018-07-05 RX ORDER — SODIUM CHLORIDE 9 MG/ML
INJECTION, SOLUTION INTRAVENOUS CONTINUOUS
Status: DISCONTINUED | OUTPATIENT
Start: 2018-07-05 | End: 2018-07-06

## 2018-07-05 RX ORDER — SODIUM CHLORIDE 9 MG/ML
INJECTION, SOLUTION INTRAVENOUS CONTINUOUS
Status: DISCONTINUED | OUTPATIENT
Start: 2018-07-05 | End: 2018-07-05

## 2018-07-05 RX ORDER — ASPIRIN 81 MG/1
81 TABLET, CHEWABLE ORAL DAILY
Status: DISCONTINUED | OUTPATIENT
Start: 2018-07-06 | End: 2018-07-08 | Stop reason: HOSPADM

## 2018-07-05 RX ORDER — SODIUM CHLORIDE 9 MG/ML
INJECTION, SOLUTION INTRAVENOUS ONCE
Status: COMPLETED | OUTPATIENT
Start: 2018-07-05 | End: 2018-07-05

## 2018-07-05 RX ORDER — GUAR GUM
1 PACKET (EA) ORAL 3 TIMES DAILY
Status: DISCONTINUED | OUTPATIENT
Start: 2018-07-05 | End: 2018-07-08 | Stop reason: HOSPADM

## 2018-07-05 RX ORDER — LEVOTHYROXINE SODIUM 150 UG/1
150 TABLET ORAL
Status: DISCONTINUED | OUTPATIENT
Start: 2018-07-06 | End: 2018-07-08 | Stop reason: HOSPADM

## 2018-07-05 RX ORDER — HYDROCORTISONE 10 MG/1
10 TABLET ORAL EVERY EVENING
Status: DISCONTINUED | OUTPATIENT
Start: 2018-07-06 | End: 2018-07-08 | Stop reason: HOSPADM

## 2018-07-05 RX ORDER — ALBUMIN (HUMAN) 12.5 G/50ML
12.5 SOLUTION INTRAVENOUS ONCE
Status: COMPLETED | OUTPATIENT
Start: 2018-07-05 | End: 2018-07-05

## 2018-07-05 RX ORDER — CEFTRIAXONE 2 G/1
2 INJECTION, POWDER, FOR SOLUTION INTRAMUSCULAR; INTRAVENOUS EVERY 24 HOURS
Status: DISCONTINUED | OUTPATIENT
Start: 2018-07-05 | End: 2018-07-08 | Stop reason: HOSPADM

## 2018-07-05 RX ORDER — WHEAT DEXTRIN 3 G/3.8 G
2 POWDER (GRAM) ORAL DAILY
Status: ON HOLD | COMMUNITY
End: 2019-03-12

## 2018-07-05 RX ORDER — NOREPINEPHRINE BITARTRATE/D5W 16MG/250ML
0.03-0.4 PLASTIC BAG, INJECTION (ML) INTRAVENOUS CONTINUOUS
Status: DISCONTINUED | OUTPATIENT
Start: 2018-07-05 | End: 2018-07-05

## 2018-07-05 RX ORDER — ALBUTEROL SULFATE 0.83 MG/ML
2.5 SOLUTION RESPIRATORY (INHALATION) EVERY 4 HOURS PRN
Status: DISCONTINUED | OUTPATIENT
Start: 2018-07-05 | End: 2018-07-08 | Stop reason: HOSPADM

## 2018-07-05 RX ORDER — CARBAMAZEPINE 100 MG/5ML
150 SUSPENSION ORAL EVERY 6 HOURS
Status: DISCONTINUED | OUTPATIENT
Start: 2018-07-05 | End: 2018-07-07

## 2018-07-05 RX ORDER — NALOXONE HYDROCHLORIDE 0.4 MG/ML
.1-.4 INJECTION, SOLUTION INTRAMUSCULAR; INTRAVENOUS; SUBCUTANEOUS
Status: DISCONTINUED | OUTPATIENT
Start: 2018-07-05 | End: 2018-07-08 | Stop reason: HOSPADM

## 2018-07-05 RX ORDER — PIPERACILLIN SODIUM, TAZOBACTAM SODIUM 4; .5 G/20ML; G/20ML
4.5 INJECTION, POWDER, LYOPHILIZED, FOR SOLUTION INTRAVENOUS ONCE
Status: COMPLETED | OUTPATIENT
Start: 2018-07-05 | End: 2018-07-05

## 2018-07-05 RX ORDER — LIDOCAINE 40 MG/G
CREAM TOPICAL
Status: DISCONTINUED | OUTPATIENT
Start: 2018-07-05 | End: 2018-07-05 | Stop reason: CLARIF

## 2018-07-05 RX ORDER — HYDROCORTISONE 20 MG/1
20 TABLET ORAL EVERY MORNING
Status: DISCONTINUED | OUTPATIENT
Start: 2018-07-06 | End: 2018-07-08 | Stop reason: HOSPADM

## 2018-07-05 RX ORDER — PIPERACILLIN SODIUM, TAZOBACTAM SODIUM 4; .5 G/20ML; G/20ML
INJECTION, POWDER, LYOPHILIZED, FOR SOLUTION INTRAVENOUS
Status: DISCONTINUED
Start: 2018-07-05 | End: 2018-07-05 | Stop reason: HOSPADM

## 2018-07-05 RX ORDER — ACETAMINOPHEN 500 MG
1000 TABLET ORAL ONCE
Status: COMPLETED | OUTPATIENT
Start: 2018-07-05 | End: 2018-07-05

## 2018-07-05 RX ADMIN — ACETAMINOPHEN 1000 MG: 500 TABLET, FILM COATED ORAL at 00:39

## 2018-07-05 RX ADMIN — CARBAMAZEPINE 150 MG: 100 SUSPENSION ORAL at 23:21

## 2018-07-05 RX ADMIN — PIPERACILLIN SODIUM,TAZOBACTAM SODIUM 4.5 G: 4; .5 INJECTION, POWDER, FOR SOLUTION INTRAVENOUS at 01:08

## 2018-07-05 RX ADMIN — POTASSIUM & SODIUM PHOSPHATES POWDER PACK 280-160-250 MG 1 PACKET: 280-160-250 PACK at 21:10

## 2018-07-05 RX ADMIN — Medication 1 PACKET: at 15:43

## 2018-07-05 RX ADMIN — HYDROCORTISONE SODIUM SUCCINATE 50 MG: 100 INJECTION, POWDER, FOR SOLUTION INTRAMUSCULAR; INTRAVENOUS at 11:49

## 2018-07-05 RX ADMIN — CARBAMAZEPINE 150 MG: 100 SUSPENSION ORAL at 11:44

## 2018-07-05 RX ADMIN — CEFTRIAXONE SODIUM 2 G: 2 INJECTION, POWDER, FOR SOLUTION INTRAMUSCULAR; INTRAVENOUS at 11:50

## 2018-07-05 RX ADMIN — NOREPINEPHRINE BITARTRATE 0.03 MCG/KG/MIN: 1 INJECTION INTRAVENOUS at 03:40

## 2018-07-05 RX ADMIN — CARBAMAZEPINE 150 MG: 100 SUSPENSION ORAL at 17:39

## 2018-07-05 RX ADMIN — Medication 10 MG: at 20:08

## 2018-07-05 RX ADMIN — LIDOCAINE HYDROCHLORIDE 10 ML: 20 JELLY TOPICAL at 05:27

## 2018-07-05 RX ADMIN — ENOXAPARIN SODIUM 40 MG: 40 INJECTION SUBCUTANEOUS at 16:26

## 2018-07-05 RX ADMIN — ALBUMIN HUMAN 12.5 G: 0.25 SOLUTION INTRAVENOUS at 06:07

## 2018-07-05 RX ADMIN — SODIUM CHLORIDE 2154 ML: 9 INJECTION, SOLUTION INTRAVENOUS at 00:26

## 2018-07-05 RX ADMIN — Medication 1 PACKET: at 21:11

## 2018-07-05 RX ADMIN — SODIUM CHLORIDE: 9 INJECTION, SOLUTION INTRAVENOUS at 02:46

## 2018-07-05 RX ADMIN — HYDROCORTISONE SODIUM SUCCINATE 50 MG: 100 INJECTION, POWDER, FOR SOLUTION INTRAMUSCULAR; INTRAVENOUS at 05:56

## 2018-07-05 RX ADMIN — SODIUM CHLORIDE: 9 INJECTION, SOLUTION INTRAVENOUS at 03:50

## 2018-07-05 RX ADMIN — SODIUM CHLORIDE: 9 INJECTION, SOLUTION INTRAVENOUS at 08:25

## 2018-07-05 RX ADMIN — BRIVARACETAM 100 MG: 10 SOLUTION ORAL at 21:00

## 2018-07-05 RX ADMIN — SODIUM CHLORIDE 1000 ML: 9 INJECTION, SOLUTION INTRAVENOUS at 20:23

## 2018-07-05 RX ADMIN — SODIUM CHLORIDE: 9 INJECTION, SOLUTION INTRAVENOUS at 06:47

## 2018-07-05 RX ADMIN — SODIUM CHLORIDE 1000 ML: 9 INJECTION, SOLUTION INTRAVENOUS at 02:01

## 2018-07-05 ASSESSMENT — ACTIVITIES OF DAILY LIVING (ADL)
BATHING: 4-->COMPLETELY DEPENDENT
RETIRED_COMMUNICATION: 3-->UNABLE TO SPEAK (NOT RELATED TO LANGUAGE BARRIER)
SWALLOWING: 2-->DIFFICULTY SWALLOWING LIQUIDS/FOODS
COGNITION: 2 - DIFFICULTY WITH ORGANIZING THOUGHTS
TOILETING: 4-->COMPLETELY DEPENDENT
FALL_HISTORY_WITHIN_LAST_SIX_MONTHS: NO
AMBULATION: 4-->COMPLETELY DEPENDENT
TRANSFERRING: 4-->COMPLETELY DEPENDENT
DRESS: 4-->COMPLETELY DEPENDENT
RETIRED_EATING: 4-->COMPLETELY DEPENDENT

## 2018-07-05 ASSESSMENT — ENCOUNTER SYMPTOMS
MYALGIAS: 0
FEVER: 1
COUGH: 1

## 2018-07-05 NOTE — ED NOTES
[facility]  ED Nurse Handoff Report    ED Chief complaint: Fever (Pt presents from home with temp of 103 at home. Pt denies pain)      ED Diagnosis:   Final diagnoses:   None       Code Status: Hospital MD to address     Allergies:   Allergies   Allergen Reactions     Dilantin [Phenytoin Sodium]      Valproic Acid      Toxicity c bone marrow suspension, elevated ammonia levels        Activity level - Baseline/Home:  Total Care    Activity Level - Current:   Total Care     Needed?: No    Isolation: yes   Infection: Not Applicable  MRSA  Bariatric?: No    Vital Signs:   Vitals:    07/05/18 0340 07/05/18 0343 07/05/18 0345 07/05/18 0350   BP: (!) 79/52  (!) 82/52 136/58   Pulse:       Resp: 15 27 30 17   Temp:       TempSrc:       SpO2: 98% 98% 98% 99%   Weight:       Height:           Cardiac Rhythm: ,   Cardiac  Cardiac Rhythm: Sinus tachycardia    Pain level:      Is this patient confused?: No - pt nonverbal but able to nod head yes/no appropriately   [place] - Suicide Severity Rating Scale Completed? yes  If yes, what color did the patient score?  White    Patient Report: Initial Complaint: Fever   Focused Assessment: Pt presents from home where he lives with mother and caregivers. Pt has hx of TBI in 1988 and is essentially nonverbal. Pt can answer yes/no to questions but otherwise just making groaning sounds. Mother reports pt began having a cough today and spiked a temp late this evening with tmax 104. Here, pt tachycardic in the 130s and initial pressures soft. Temp 102.4 and tachypnic with audible gurgling and nonproductive cough. Lung sounds course- CXR showing R PNA. Cath urine performed which was negative for UTI. Pt has jtube which tylenol was given through. Pt lactic 3.0 and cultures x2 sent to lab. WBC 12. Pt given 2 liters NS and repeat lactic to be performed. IV zosyn infusing and pt given 1g tylenol. Current vitals- sinus tach 121 bpm, 102/57 and 95% on RA. Mother at  bedside. Pt having 3rd liter of NS and pressures remain soft so pt will get central line and pressors. Repeat lactic 2.6   Tests Performed: Blood work, cultures x2, CXR, UA  Abnormal Results: lactic 3.0, wbc 12  Treatments provided: See above and MAR     Family Comments: Present     OBS brochure/video discussed/provided to patient: No    ED Medications:   Medications   sodium chloride (PF) 0.9% PF flush 3 mL (not administered)   sodium chloride 0.9% infusion ( Intravenous Stopped 7/5/18 0349)   norepinephrine (LEVOPHED) 16 mg in D5W 250 mL infusion (0.08 mcg/kg/min × 77.1 kg Intravenous Rate/Dose Change 7/5/18 0352)   0.9% sodium chloride BOLUS (0 mLs Intravenous Stopped 7/5/18 0243)   0.9% sodium chloride BOLUS (0 mLs Intravenous Stopped 7/5/18 0200)   acetaminophen (TYLENOL) tablet 1,000 mg (1,000 mg Oral Given 7/5/18 0039)   piperacillin-tazobactam (ZOSYN) 4.5 g vial to attach to  mL bag (0 g Intravenous Stopped 7/5/18 0141)   sodium chloride 0.9% infusion ( Intravenous New Bag 7/5/18 0350)       Drips infusing?:  Yes    For the majority of the shift this patient was Green.   Interventions performed were meds, repo, updates .    Severe Sepsis OR Septic Shock Diagnosis Present: Yes   3 liters NS infused, IV zosyn, plan to place central line and start vasopressors       ED NURSE PHONE NUMBER: [phone]

## 2018-07-05 NOTE — PROGRESS NOTES
Patient remains off pressors and elevated LA is now near normal.      I restarted his home dose of hydrocortisone and decreased the iv dose to q 12 and can probably stop tomorrow.     Patient ok for transfer to floor--I gave tele handoff to Dr Keita.

## 2018-07-05 NOTE — ED NOTES
Pt moved to stabilzation room 2 with plan for Dr. Odonnell to place central line and start vasopressors. Pt just finished 3rd liter NS- heart rate remains tachycardic at 118-121 bpm and blood pressure 99/51

## 2018-07-05 NOTE — PLAN OF CARE
Problem: Patient Care Overview  Goal: Plan of Care/Patient Progress Review  Outcome: No Change  Nonverbal at baseline. RUE and RLE moderately impaired and withdraws, LUE with 4/5 strength. Tachycardic at times with movement otherwise VSS on RA. Tele SR. LS coarse. Nonproductive, congested cough. J-tube in place. Denies pain.TF stared at 40 mL/hr, to be increased to goal rate of 60 mL/hr at 2200, 60 mL Free water flushes every 4 hours. Lerma removed, incontinent. Transfer to station 66, Report given to NANCI Vega. Belongings sent with patient at time of transfer. Mother, Savannah called and updated.

## 2018-07-05 NOTE — PROGRESS NOTES
[facility]    Hospitalist Progress Note    Assessment & Plan   Keyon Farias is a 57 year old male with PMHx of TBI with aphagia, R sided spastic hemiplegia, dysphagia with G-tube for TFs/meds, seizure disorder, panhypopituitarism and frequent hospital admissions for recurrent pneumonia (most recently from 5/1/18 - 5/5/18, then again from 5/15/18 - 5/19/18) who was admitted to the ICU on 7/5/2018 for management of septic shock suspected secondary to recurrent pneumonia requiring vasopressors. Was able to wean off Levophed after several hours and is now stable for transfer out of the ICU.     Sepsis secondary to Recurrent HCAP:  Brought to ED for evaluation of fever which was first noted at 2000 on 7/4 PM with Tmax 103. Has known history of recurrent pneumonias prompting frequent hospitalizations. Febrile in the ED with T 103, tachycardic and hypotensive. WBC 12.2, Lactate 2.6 on presentation but peaked at 4.6. Procal 0.16 (low risk systemic infection). Scattered rhonchi heard on exam and CXR showed a mild R basilar infiltrate vs atelectasis. Ua bland. Started on Zosyn in the ED. Given 5L IVFs and ultimately started on pressors (Levophed). Abx initially held per intensitivist given clinical picture of possible pneumonitis but was ultimately placed on Rocephin.  -- cont Rocephin  -- lactate trending back down -- was 2.9 on most recent recheck, will repeat this evening  -- monitor fever curve and blood and urine cultures (remain neg thus far)  -- cont supportive cares including prn albuterol nebs, not presently requiring supplemental O2     TBI with aphasia  Dysphagia with G-tube for TFs  Patient's mother is his caregiver, along with home care attendants. Has frequent hospitalization for recurrent pneumonias.  -- conts on TFs    Seizure Disorder  Secondary to TBI. Chronic and stable on PTA AEDs including brivaracetml and carbamazepine which have been continued.     Panhypopituitarism:  Placed on stress  dosed steroids   -- will wean stress dosed steroids (decreased from q6h to q12h today), can stop tomorrow  -- PTA hydrocortisone has been resumed (takes 20mg qAM and 10mg qPM)  -- cont levothyroxine  -- takes testosterone q2 wks    GERD:  Chronic and stable on PPI which has been resumed    Pain Assessment:  Current Pain Score 7/5/2018   Patient currently in pain? sleeping: patient not able to self report   Pain score (0-10) -   Pain location -   Pain descriptors -   rFLACC pain score -   CPOT pain score -   Keyon liriano pain level was assessed and he currently denies pain.      FEN: cont NS @75ml/h, phos low at 1.9 -- will resume neutraphos, lytes otherwise stable, cont TFs via g-tube  DVT Prophylaxis: Lovenox  Code Status: Full Code    Disposition: Can transfer out of ICU to general medical floor. Hospitalist service will assume care. Discharge pending continued clinical improvement    Sun Keita    Interval History   Patient admitted to the ICU earlier this morning with sepsis secondary to suspected recurrent pneumonia. Has hx of pneumonia prompting hospitalization. Was initially requiring pressors. Improved with IVFs and abx and Levophed was weaned at 1100. Mostly nonverbal which is his baseline. Breathing well and remains off supplemental O2.     -Data reviewed today: I reviewed all new labs and imaging results over the last 24 hours. I personally reviewed no images or EKG's today.    Physical Exam   Temp: 98.8  F (37.1  C) Temp src: Bladder BP: 128/63 Pulse: 126 Heart Rate: 104 Resp: 24 SpO2: 96 % O2 Device: None (Room air)    Vitals:    07/05/18 0023 07/05/18 0515   Weight: 77.1 kg (170 lb) 75.9 kg (167 lb 5.3 oz)     Vital Signs with Ranges  Temp:  [98.8  F (37.1  C)-102.4  F (39.1  C)] 98.8  F (37.1  C)  Pulse:  [126] 126  Heart Rate:  [] 104  Resp:  [12-31] 24  BP: ()/(40-86) 128/63  SpO2:  [91 %-100 %] 96 %  I/O last 3 completed shifts:  In: 4511.18 [I.V.:1307.18; IV  Piggyback:3154]  Out: 2900 [Urine:2900]    Constitutional: Resting comfortably, alert but nonverbal, NAD  Respiratory: +faint bibasilar crackles (R>L), no rhonchi, no wheeze, no increased work of breathing  Cardiovascular: HR tachycardic with RR, no MGR, no LE edema  GI: S, NT, ND, +BS, +g-tube  Skin/Integumen: warm/dry, scattered excoriations  Other:      Medications     norepinephrine Stopped (07/05/18 1121)     sodium chloride 75 mL/hr at 07/05/18 0825       Brivaracetam  100 mg Oral BID     carBAMazepine  150 mg Oral Q6H     cefTRIAXone  2 g Intravenous Q24H     enoxaparin  40 mg Subcutaneous Q24H     fiber modular  1 packet Per Feeding Tube TID     hydrocortisone  10 mg Oral QPM     [START ON 7/6/2018] hydrocortisone  20 mg Per J Tube QAM     hydrocortisone sodium succinate PF  50 mg Intravenous Q12H     [START ON 7/6/2018] levothyroxine  150 mcg Per J Tube QAM AC     lidocaine 2 %  10 mL Urethral Once     sodium chloride (PF)  10 mL Intracatheter Q8H     sodium chloride (PF)  3 mL Intravenous Q8H       Data     Recent Labs  Lab 07/05/18  0021   WBC 12.2*   HGB 14.2   MCV 89   *      POTASSIUM 4.0   CHLORIDE 97   CO2 30   BUN 15   CR 0.87   ANIONGAP 9   STEVE 8.7   *   ALBUMIN 3.3*   PROTTOTAL 7.8   BILITOTAL 0.5   ALKPHOS 96   ALT 28   AST 17       Recent Results (from the past 24 hour(s))   Chest XR,  PA & LAT    Narrative    XR CHEST 2 VW  7/5/2018 1:11 AM     INDICATION: Fever, history of aspiration pneumonia.    COMPARISON: 5/15/2018.      Impression    IMPRESSION: Shallow inspiration. Mild right base atelectasis or  infiltrate. Normal heart size.    PANFILO KING MD   Chest  XR, 1 view PORTABLE    Narrative    XR CHEST PORT 1 VW  7/5/2018 3:40 AM     INDICATION: Central line placement.    COMPARISON: 7/5/2018 at 0106 hours.      Impression    IMPRESSION: New right IJ line with tip in SVC. Otherwise no change.  Mild infiltrate or atelectasis at the right base.    PANFILO KING MD

## 2018-07-05 NOTE — H&P
[facility] Intensive Care Unit  History and Physical  July 5, 2018      Keyon Farias MRN# [id number]   Age: 57 year old YOB: 1960     Date of Admission: 7/5/2018    Primary care provider: Carlos Gomez    Problem List:  1. Presumed sepsis; hypotension, fever, mildly elevated lactate  2. 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 H/O pancreatitis  4. H/O TBI-aphasia/dysphagi/sz DO            Assessment and Plan:     Keyon Farias is a 57 year old male admitted on 7/5/2018 for   Chief Complaint   Patient presents with     Fever     Pt presents from home with temp of 103 at home. Pt denies pain   .      Neurology:  Awake, alert and responsive  Appears at baseline    Pulmonary:  Concern for aspiration pneumonia/pneumonitis however-minimal infiltrate RLL.   Saturations mid 90's on RA  Plan:  1. 02 to keep Sp02 >92% if needed    Cardiovascular system:   Hypotension/mild elevation of lactic acid (3.0) which decreased to 2.6 but then increased to 4.6 although now off pressors and seems well perfused.   Received ~ 5 L fluid (crystalloid) and on norepinephrine gtt with MAP 90 mm Hg  mg  Plan:  Repeat LA   IVF at 75    Renal:   Renal parameters wnl  Aaron placed with large UOP  Plan:  1. Remove aaron     ID:   Febrile at home and initially in ED  Mild increase in WBC  Above still possible secondary to adrenal insufficiency  Given Zosyn in ED  Plan:  Ceftriaxone 2 gms     GI:   No known issues      Endocrine:   Panhypopituitary-on enteral HCT, testosterone  HC 50 q 6 and restart home dose of enteral hydrocortisone.    Synthroid       Heme/Onc:   Mild increase WBC  Hgb/plts stable  Follow counts    Nutrition:  Restart home tube feeds       Billing: Total time spend providing critical care was 40 min         Treatment goals for next 24 hours:   Repeat LA  Ceftriaxone  Remove aaron            History of Present Illness: (from ED)     57 year old male with a  history of pneumonia, pancreatitis, sepsis, and a TBI who presents to the ED for evaluation of a fever. The patient's caretaker reports that the she checked the patients temperature around 2000 because the patient was coughing but stated that the patient was afebrile at this time. Later in the night around 2300, the patient's caretaker  took his temperature again but this time the patient was febrile with a temperature of 103 on their home thermometer. Apparently he did have a couple episodes of loose but it was reported be secondary to some prune juice and metamucil. The patient denies feeling any symptoms of pain at this time. He has not received any medication for his fever prior to presenting to the ED.            Physical Examination:   GEN: Alert, not in distress, cooperative    HEENT: Anicteric  CHEST: Scattered rhonchi, no crackles   CVS: Normal S1 and S2, no additional heart sounds, murmur, rub,   ABDO: Bowel sounds present, soft, non tender, G tube site ok   EXTREM musculoskeletal: no peripheral edema, deformity, cyanosis, clubbing  Neurology: alert, non verbal   Skin: no rash, warm and well perfused   Right IJ ok           Labs:       CBC RESULTS:       Recent Labs  Lab 07/05/18  0021   WBC 12.2*   RBC 4.62   HGB 14.2   HCT 40.9   *       Basic Metabolic Panel:    Recent Labs  Lab 07/05/18  0021      POTASSIUM 4.0   CHLORIDE 97   CO2 30   BUN 15   CR 0.87   *   STEVE 8.7       INRNo lab results found in last 7 days.           Imaging:     XR CHEST PORT 1 VW  7/5/2018 3:40 AM      INDICATION: Central line placement.     COMPARISON: 7/5/2018 at 0106 hours.         IMPRESSION: New right IJ line with tip in SVC. Otherwise no change.  Mild infiltrate or atelectasis at the right base.

## 2018-07-05 NOTE — PROGRESS NOTES
[facility]  Critical Care Service  Progress Note  Date of Service (when I saw the patient): 07/05/2018  Main Plans for Today    1. Recheck lactate  2. Restart home seizure and thyroid meds  3. Wean levophed    Assessment & Plan   Keyon Farias is a 57 year old male w/ a pmhx significant for TBI, panhypopituitarism, epilepsy w/ hx of SE, hx of necrotizing pancreatitis, and hx of aspiration pneumonia who was admitted on 7/5/2018 for hypotension, fever, and elevated lactate concerning for sepsis.     Neuro  Patient is alert and smiling on exam today. Per past notes, this appears to be at baseline.   1. Epilepsy  -continue home brivaracetam   -continue home carbamazepine     Resp  The patient is saturating in high 90's on room air. CXR this am showed potential RLL infiltrate. With only mildly elevated WBC and normal procalcitonin, there is some question of whether this new or old infiltrate. However, presentation with fever and signs of sepsis do point toward new or worsening bacterial infection. He did receive one dose of zosyn in the ED which was discontinued by the overnight intensivist.   1. Bilateral lower lobe atelectasis  2. RLL infiltrate  -start rocephin for treatment of sepsis with suspicion for pulmonary etiology    CV  Questioning if hypotension is related to infection & septic shock vs adrenal insufficiency. Likely, both contributing. Patient was hypotensive on presentation to the ED, concerning for septic shock. qSOFA = 2 at that time. He met criteria for septic shock without multiple organ dysfunction at that time. Did not respond to 5L of fluids but did respond to stress dosing of hydrocortisone and levophed. Lactic acid elevated to 4.6 on recheck this morning, was 3.0 then 2.6 in the ED. He has been weaned off the levophed and has maintained blood pressures in the 100-110's/60's-70's. qSOFA now is 0.  1. Hypotension -- likely 2/2 sepsis  -lactate on recheck this afternoon =  2.9  -Continue IV fluids 75 ml/hr    GI/Nutrition  Abdomen was tender on exam in LLQ. Per ED notes, patient had loose stool but did have metamucil and prune juice prior which can account for this. No diarrhea since arriving in the ICU.  -continue to follow for diarrhea; consider stool panel  -restart Jtube feeding     Renal  No signs of ALMAS. Stable.    ID  The CXR is relatively unchanged from 5/15 with questionable new/worsening RLL infiltrate. WBC was mildly elevated at 12.2 and procalcitonin was 0.16. Urine culture pending but UA only showed small leuk esterase so less concerning for UTI. Blood cultures negative on preliminary result. However, patient was febrile to 103F at home, has a history of aspiration pneumonia, and has some questionable changes to xray making lungs the most likely source.   1. Hypotension- questioning sepsis vs adrenal insufficiency  2. RLL infiltrate  See above for abx.    Endocrine  Patient has a history of panhypopituitarism from TBI. He is on levothyroxine, hydrocortisone, and testosterone at home.   -continue home levothyroxine  -stress steroid dosing - solucortef  -hold testosterone shots    MSK  Moves all limbs freely. No acute concerns at this time.     Skin  No acute concerns at this time.       General cares:  DVT Prophylaxis: Ambulate every shift  GI Prophylaxis: PPI  Restraints: Restraints for medical healing needed: NO  Family update by me today: Patient's family was not in the room when I went in to exam him. Will try again later.     Kristi Frankeljyoti      Interval History   Patient has done well this morning. Alert and smiling. Blood pressure stable with reduction in levophed.     Physical Exam   Temp: 99.5  F (37.5  C) Temp src: Bladder Temp  Min: 99  F (37.2  C)  Max: 102.4  F (39.1  C) BP: 105/68 Pulse: 126 Heart Rate: 105 Resp: 24 SpO2: 91 % O2 Device: None (Room air)    Vitals:    07/05/18 0023 07/05/18 0515   Weight: 77.1 kg (170 lb) 75.9 kg (167 lb 5.3 oz)      I/O last 3 completed shifts:  In: 4227.04 [I.V.:1023.04; IV Piggyback:3154]  Out: 600 [Urine:600]    GEN: Alert, no acute distress.   EYES: Pupil dilation bilaterally.   HEENT:  Normocephalic. Oropharynx pink and moist w/o exudate or erythema.   CV: RRR. No murmurs.   PULM/CHEST: Clear bilaterally w/o crackles, rales, or wheezes. Poor air movement in bases bilaterally.   GI: Bowel sounds active. Soft, nondistended abdomen w/o masses or organomegaly. Minor tenderness to palpation in LLQ.   : aaron catheter in place, urine yellow and clear  EXTREMITIES: Pedal pulses 2+. No edema.  SKIN: No rashes, sores or ulcerations      Data     Recent Labs  Lab 07/05/18  0021   WBC 12.2*   HGB 14.2   MCV 89   *      POTASSIUM 4.0   CHLORIDE 97   CO2 30   BUN 15   CR 0.87   ANIONGAP 9   STEVE 8.7   *   ALBUMIN 3.3*   PROTTOTAL 7.8   BILITOTAL 0.5   ALKPHOS 96   ALT 28   AST 17     Scribe Disclosure:   I, Kristi Can, am serving as a scribe; to document services personally performed by Dr. Hank Dalton - -based on data collection and the provider's statements to me.     Provider Disclosure:  I agree with above History, Review of Systems, Physical exam and Plan.  I have reviewed the content of the documentation and have edited it as needed. I have personally performed the services documented here and the documentation accurately represents those services and the decisions I have made.      Electronically signed by:  {PL providers :[id number]}

## 2018-07-05 NOTE — H&P
"[facility] Intensive Care Unit  History and Physical  July 5, 2018      Keyon Farias MRN# [id number]   Age: 57 year old YOB: 1960     Date of Admission: 7/5/2018    Primary care provider: Carlos Gomez    Problem List:  1. Presumed sepsis; hypotension, fever, mildly elevated lactate  2. 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 H/O pancreatitis  4. 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 Pt denies pain   .      Neurology:  Awake, alert and responsive  Appears at baseline    Pulmonary:  Concern for aspiration pneumonia/pneumonitis however-I am unimpressed with any major change in his imaging/CXR  Baseline low lung volumes  Saturations mid 90's on RA  Plan:  1. 02 to keep Sp02 >92% if needed    Cardiovascular system:   Hypotension/mild elevation of lactic acid (3.0) which is now 2.6  Received ~ 5 L fluid (crystalloid) and on norepinephrine gtt with MAP 90 mm Hg  mg  PCT low  Suspect above (and previous similar admissions) secondary to adrenal insufficiency perhaps exacerbated when patient \"dry\".  Plan:  1. 25% albumin  2. IV Solucortef-stress dose  3. Wean off norepinephrine gtt  4. Place aaron-monitor I/Os  5. Follow lactic acid    Renal:   Renal parameters wnl  Aaron placed with large UOP  Plan:  1. Follow UOP/renal parameters    ID:   Febrile apparently at home and initially in ED  Mild increase in WBC  PCT 0.16 (low risk systemic infection)  No obvious source  Do not feel aspiration pneumonia (pneumonitis at best-but not obvious)  Above still possible secondary to adrenal insufficiency  Given Zosyn in ED  Plan:  1. Will hold further antimicrobial  2. Follow cultures    GI:   No known issues  ? Loose stool per mother      Endocrine:   Panhypopituitary-on " enteral HCT, testosterone      Heme/Onc:   Mild increase WBC  Hgb/plts stable  Follow counts      Billing: Total time spend providing critical care was 40 min         Treatment goals for next 24 hours:   1. IV hydrocortisone  2. 25% albumin  3. Wean down/off norepinephrine gtt  4. Consider fluorinef  5. Likely transfer to Hospitalist service-later today        Elen Carrillo MD  #[id number]           History of Present Illness: (from ED)     57 year old male with a history of pneumonia, pancreatitis, sepsis, and a TBI who presents to the ED for evaluation of a fever. The patient's caretaker reports that the she checked the patients temperature around 2000 because the patient was coughing but stated that the patient was afebrile at this time. Later in the night around 2300, the patient's caretaker  took his temperature again but this time the patient was febrile with a temperature of 103 on their home thermometer. Apparently he did have a couple episodes of loose sbut it was reported be secondary to some prune juice and metamucil. The patient denies feeling any symptoms of pain at this time. He has not received any medication for his fever prior to presenting to the ED.            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      Pneumonia      Seizures (H)     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     Septic shock (H)      Spastic hemiplegia affecting dominant side (H)     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     Thyroid disease      Tracheostomy care (H)      Traumatic brain injury (H) 1989     Unspecified cerebral artery occlusion with cerebral infarction 1989     UTI (urinary tract infection)      Ventricular fibrillation (H)      Ventricular tachyarrhythmia (H)              Past Surgical History:     Past Surgical History:   Procedure Laterality Date     ENDOSCOPIC ULTRASOUND UPPER GASTROINTESTINAL TRACT (GI) N/A 1/30/2017    Procedure: ENDOSCOPIC ULTRASOUND, ESOPHAGOSCOPY / UPPER GASTROINTESTINAL TRACT (GI);  Surgeon: Jus Montana MD;  Location: [facility] OR     ENDOSCOPIC ULTRASOUND, ESOPHAGOSCOPY, GASTROSCOPY, DUODENOSCOPY (EGD), NECROSECTOMY N/A 2/7/2017    Procedure: ENDOSCOPIC ULTRASOUND, ESOPHAGOSCOPY, GASTROSCOPY, DUODENOSCOPY (EGD), NECROSECTOMY;  Surgeon: Jack Marcus MD;  Location:  OR     ESOPHAGOSCOPY, GASTROSCOPY, DUODENOSCOPY (EGD), COMBINED  3/13/2014    Procedure: COMBINED ESOPHAGOSCOPY, GASTROSCOPY, DUODENOSCOPY (EGD), BIOPSY SINGLE OR MULTIPLE;  gastroscopy;  Surgeon: Digna Rhodes MD;  Location: [facility]     ESOPHAGOSCOPY, GASTROSCOPY, DUODENOSCOPY (EGD), COMBINED N/A 12/6/2016    Procedure: COMBINED ESOPHAGOSCOPY, GASTROSCOPY, DUODENOSCOPY (EGD);  Surgeon: Digna Rhodes MD;  Location: [facility]     ESOPHAGOSCOPY, GASTROSCOPY, DUODENOSCOPY (EGD), COMBINED N/A 2/7/2017    Procedure: COMBINED ENDOSCOPIC ULTRASOUND, ESOPHAGOSCOPY, GASTROSCOPY, DUODENOSCOPY (EGD), FINE NEEDLE ASPIRATE/BIOPSY;  Surgeon: Too Thakur MD;  Location:  OR     HEAD & NECK SURGERY      reconstructive facial surgery following accident in 1989     LAPAROSCOPIC APPENDECTOMY  7/30/2013    Procedure: LAPAROSCOPIC APPENDECTOMY;  LAPAROSCOPIC APPENDECTOMY;  Surgeon: Manish Pierce MD;  Location:  OR     LAPAROSCOPIC ASSISTED INSERTION TUBE GASTROTOMY N/A 9/7/2016    Procedure: LAPAROSCOPIC ASSISTED INSERTION TUBE GASTROSTOMY;  Surgeon: Manish Pierce MD;  Location:  [facility] OR     ORTHOPEDIC SURGERY      right hand repair     TRACHEOSTOMY N/A 9/3/2016    Procedure: TRACHEOSTOMY;  Surgeon: João Ortiz MD;  Location: [facility] OR     TRACHEOSTOMY N/A 12/2/2016    Procedure: TRACHEOSTOMY;  Surgeon: João Ortiz MD;  Location:  OR     VASCULAR SURGERY               Social History:     Social History     Social History     Marital status: Single     Spouse name: N/A     Number of children: N/A     Years of education: N/A     Occupational History     Not on file.     Social History Main Topics     Smoking status: Former Smoker     Quit date: 4/23/1989     Smokeless tobacco: Never Used     Alcohol use No     Drug use: No     Sexual activity: No     Other Topics Concern     Not on file     Social History Narrative         Smoking:   Social History   Substance Use Topics     Smoking status: Former Smoker     Quit date: 4/23/1989     Smokeless tobacco: Never Used     Alcohol use No               Family History:   No family history on file.             Medications:     Current Facility-Administered Medications   Medication     hydrocortisone sodium succinate PF (solu-CORTEF) injection 50 mg     naloxone (NARCAN) injection 0.1-0.4 mg     norepinephrine (LEVOPHED) 16 mg in D5W 250 mL infusion     sodium chloride (PF) 0.9% PF flush 3 mL     sodium chloride 0.9% infusion             Review of Systems:   Unable to perform            Physical Examination:   GEN: Alert, not in distress  VS: Tmax: 99.7 F,  BP: 11/79  mmHg, P:  96/min, Resp rate:23  /min, SpO2: 93-97 %,RA   HEENT: Anicteric  CHEST: Scattered rhonchi, decreased air entry bilat posteriorly, no wheeze.  CVS: Normal S1 and S2, no additional heart sounds, murmur, rub,   ABDO: Bowel sounds present, soft, non tender, mildly distended, no organomegaly, ascitis, mass  EXTREM musculoskeletal: no peripheral edema, deformity, cyanosis, clubbing  Neurology: alert, orientedx3, no motor/sensorial deficits, cranial nerves grossly  normal  Skin: no rash          Labs:       CBC RESULTS:     Recent Labs  Lab 07/05/18  0021   WBC 12.2*   RBC 4.62   HGB 14.2   HCT 40.9   *       Basic Metabolic Panel:    Recent Labs  Lab 07/05/18  0021      POTASSIUM 4.0   CHLORIDE 97   CO2 30   BUN 15   CR 0.87   *   STEVE 8.7       INRNo lab results found in last 7 days.           Imaging:     XR CHEST PORT 1 VW  7/5/2018 3:40 AM      INDICATION: Central line placement.     COMPARISON: 7/5/2018 at 0106 hours.         IMPRESSION: New right IJ line with tip in SVC. Otherwise no change.  Mild infiltrate or atelectasis at the right base.

## 2018-07-05 NOTE — IP AVS SNAPSHOT
MRN:[id number]                      After Visit Summary   7/5/2018    Keyon Farias    MRN: [id number]           Thank you!     Thank you for choosing [facility] for your care. Our goal is always to provide you with excellent care. Hearing back from our patients is one way we can continue to improve our services. Please take a few minutes to complete the written survey that you may receive in the mail after you visit with us. Thank you!        Patient Information     Date Of Birth          1960        Designated Caregiver       Most Recent Value    Caregiver    Will someone help with your care after discharge? yes    Name of designated caregiver Savannah    Phone number of caregiver listed    Caregiver address same as pt      About your hospital stay     You were admitted on:  July 5, 2018 You last received care in the:  [facility] [address] [facility]    You were discharged on:  July 8, 2018        Reason for your hospital stay       Pneumonia                  Who to Call     For medical emergencies, please call 911.  For non-urgent questions about your medical care, please call your primary care provider or clinic, [phone]          Attending Provider     Provider Specialty    Robbie Miller MD Emergency Medicine    Wells TanneryElen MD Internal Medicine       Primary Care Provider Office Phone # Fax #    Dinodevyn MD Patricia [phone]       When to contact your care team       Call your primary doctor or return to ER if you have any of the following: temperature greater than 100.5 or less than 96, increased shortness of breath, increased productive cough, confusion, loss of consciousness, abdominal pain, severe diarrhea, leg swellings.                  After Care Instructions     Activity       Your activity upon discharge: activity as tolerated            Diet       Follow this diet upon discharge: Orders Placed This Encounter      Adult Formula Drip  "Feeding: Continuous Isosource 1.5; Jejunostomy; Goal Rate: 60; mL/hr; Medication - Tube Feeding Flush Frequency: At least 15-30 mL water before and after medication administration and with tube clogging; Amount to Send (Nutritio...      NPO for Medical/Clinical Reasons Except for: Ice Chips                  Follow-up Appointments     Follow-up and recommended labs and tests        Follow up with primary care provider, Carlos Gomez, within 7 days for hospital follow- up.  Repeat Thyroid function tests in 4-6 weeks.                  Additional Services     Home care nursing referral       Resume prior home care services.    Your provider has ordered home care nursing services. If you have not been contacted within 2 days of your discharge please call the inpatient department phone number at [phone] .                  Pending Results     Date and Time Order Name Status Description    7/5/2018 0018 Blood culture Preliminary     7/5/2018 0018 Blood culture Preliminary             Statement of Approval     Ordered          07/08/18 1137  I have reviewed and agree with all the recommendations and orders detailed in this document.  EFFECTIVE NOW     Approved and electronically signed by:  Cele Maldonado MD             Admission Information     Date & Time Provider Department Dept. Phone    7/5/2018 Elen Carrillo MD [facility] [address] [facility] [phone]      Your Vitals Were     Blood Pressure Pulse Temperature Respirations Height Weight    99/61 (BP Location: Left arm) 61 98  F (36.7  C) (Oral) 16 1.829 m (6') 74.7 kg (164 lb 10.9 oz)    Pulse Oximetry BMI (Body Mass Index)                93% 22.34 kg/m2          AddSearch Information     AddSearch lets you send messages to your doctor, view your test results, renew your prescriptions, schedule appointments and more. To sign up, go to [email or web address] . Click on \"Log in\" on the left side of the screen, which will take you to the " "Welcome page. Then click on \"Sign up Now\" on the right side of the page.     You will be asked to enter the access code listed below, as well as some personal information. Please follow the directions to create your username and password.     Your access code is: [email or web address]  Expires: 10/6/2018  9:33 AM     Your access code will  in 90 days. If you need help or a new code, please call your [place] clinic or [phone].        Care EveryWhere ID     This is your Care EveryWhere ID. This could be used by other organizations to access your [place] medical records  [id number]        Equal Access to Services     [facility] : Angely Rivas, krishna buenrostro, tobias sofia, yaw moreland . So [facility] [phone].    ATENCIÓN: Si habla español, tiene a king disposición servicios gratuitos de asistencia lingüística. Llame al [phone].    We comply with applicable federal civil rights laws and [place] laws. We do not discriminate on the basis of race, color, national origin, age, disability, sex, sexual orientation, or gender identity.               Review of your medicines      START taking        Dose / Directions    amoxicillin-clavulanate 500-125 MG per tablet   Commonly known as:  AUGMENTIN   Used for:  Aspiration pneumonia of right lower lobe, unspecified aspiration pneumonia type (H)        Dose:  1 tablet   1 tablet by Per G Tube route 3 times daily for 6 days   Quantity:  18 tablet   Refills:  0         CONTINUE these medicines which may have CHANGED, or have new prescriptions. If we are uncertain of the size of tablets/capsules you have at home, strength may be listed as something that might have changed.        Dose / Directions    levothyroxine 25 mcg/mL Susp   Commonly known as:  SYNTHROID   This may have changed:  how much to take   Used for:  Panhypopituitarism (H)        Dose:  125 mcg   5 mLs (125 mcg) by Per J Tube route every morning " (before breakfast)   Refills:  0         CONTINUE these medicines which have NOT CHANGED        Dose / Directions    ACETAMINOPHEN PO        Dose:  650 mg   650 mg by Per Feeding Tube route every 4 hours as needed for pain   Refills:  0       albuterol (2.5 MG/3ML) 0.083% neb solution        Dose:  1 vial   Take 1 vial by nebulization every 4 hours as needed for shortness of breath / dyspnea or wheezing   Refills:  0       aspirin 10 mg/mL Susp   Used for:  Routine adult health maintenance        Dose:  81 mg   8.1 mLs (81 mg) by Per J Tube route daily   Refills:  0       bacitracin ointment        Apply topically daily as needed for wound care To PEG site.   Refills:  0       BENEFIBER PO        Take by mouth daily   Refills:  0       bisacodyl 10 MG Suppository   Commonly known as:  DULCOLAX   Used for:  Constipation, unspecified constipation type        Dose:  10 mg   Place 1 suppository (10 mg) rectally daily as needed for constipation   Quantity:  30 suppository   Refills:  0       BRIVIACT 10 MG/ML solution   Generic drug:  Brivaracetam        Dose:  100 mg   Take 100 mg by mouth 2 times daily   Refills:  0       calcium carbonate 1250 (500 Ca) MG/5ML Susp suspension   Used for:  Malnutrition (H)        Dose:  1250 mg   5 mLs (1,250 mg) by Per J Tube route 3 times daily (with meals)   Quantity:  450 mL   Refills:  0       carBAMazepine 100 MG/5ML suspension   Commonly known as:  TEGretol        Dose:  150 mg   Take 150 mg by mouth every 6 hours   Refills:  0       * hydrocortisone 2 mg/mL Susp   Commonly known as:  CORTEF   Used for:  Panhypopituitarism (H)        Dose:  20 mg   10 mLs (20 mg) by Per J Tube route every morning   Refills:  0       * hydrocortisone 2 mg/mL Susp   Commonly known as:  CORTEF   Used for:  Panhypopituitarism (H)        Dose:  10 mg   Take 5 mLs (10 mg) by mouth every evening   Refills:  0       melatonin 1 MG/ML Liqd liquid        Dose:  6 mg   6 mg by Jejunal Tube route nightly as  needed for sleep   Refills:  0       multivitamin  peds with iron 60 MG chewable tablet        Dose:  1 chew tab   Take 1 chew tab by mouth daily   Refills:  0       * pantoprazole 2 mg/mL Susp suspension   Commonly known as:  PROTONIX        Dose:  10 mg   Take 10 mg by mouth daily   Refills:  0       * pantoprazole 2 mg/mL Susp suspension   Commonly known as:  PROTONIX        Dose:  10 mg   Take 10 mg by mouth every evening   Refills:  0       potassium & sodium phosphates 280-160-250 MG Packet   Commonly known as:  NEUTRA-PHOS        Dose:  1 packet   Take 1 packet by mouth 3 times daily 0900, 1500, 2100.   Refills:  0       testosterone cypionate 200 MG/ML injection   Commonly known as:  DEPOTESTOTERONE        Dose:  100 mg   Inject 100 mg into the muscle See Admin Instructions Every 2 weeks.   Refills:  0       VITAMIN D (CHOLECALCIFEROL) PO        Dose:  4000 Units   Take 4,000 Units by mouth   Refills:  0       * Notice:  This list has 4 medication(s) that are the same as other medications prescribed for you. Read the directions carefully, and ask your doctor or other care provider to review them with you.         Where to get your medicines      These medications were sent to [facility] [place], [place] - [address]  [address] [facility] [address], [place] [address]     Phone:  [phone]     amoxicillin-clavulanate 500-125 MG per tablet                Protect others around you: Learn how to safely use, store and throw away your medicines at www.disposemymeds.org.        ANTIBIOTIC INSTRUCTION     You've Been Prescribed an Antibiotic - Now What?  Your healthcare team thinks that you or your loved one might have an infection. Some infections can be treated with antibiotics, which are powerful, life-saving drugs. Like all medications, antibiotics have side effects and should only be used when necessary. There are some important things you should know about your antibiotic treatment.      Your  healthcare team may run tests before you start taking an antibiotic.    Your team may take samples (e.g., from your blood, urine or other areas) to run tests to look for bacteria. These test can be important to determine if you need an antibiotic at all and, if you do, which antibiotic will work best.      Within a few days, your healthcare team might change or even stop your antibiotic.    Your team may start you on an antibiotic while they are working to find out what is making you sick.    Your team might change your antibiotic because test results show that a different antibiotic would be better to treat your infection.    In some cases, once your team has more information, they learn that you do not need an antibiotic at all. They may find out that you don't have an infection, or that the antibiotic you're taking won't work against your infection. For example, an infection caused by a virus can't be treated with antibiotics. Staying on an antibiotic when you don't need it is more likely to be harmful than helpful.      You may experience side effects from your antibiotic.    Like all medications, antibiotics have side effects. Some of these can be serious.    Let you healthcare team know if you have any known allergies when you are admitted to the hospital.    One significant side effect of nearly all antibiotics is the risk of severe and sometimes deadly diarrhea caused by Clostridium difficile (C. Difficile). This occurs when a person takes antibiotics because some good germs are destroyed. Antibiotic use allows C. diificile to take over, putting patients at high risk for this serious infection.    As a patient or caregiver, it is important to understand your or your loved one's antibiotic treatment. It is especially important for caregivers to speak up when patients can't speak for themselves. Here are some important questions to ask your healthcare team.    What infection is this antibiotic treating and how  do you know I have that infection?    What side effects might occur from this antibiotic?    How long will I need to take this antibiotic?    Is it safe to take this antibiotic with other medications or supplements (e.g., vitamins) that I am taking?     Are there any special directions I need to know about taking this antibiotic? For example, should I take it with food?    How will I be monitored to know whether my infection is responding to the antibiotic?    What tests may help to make sure the right antibiotic is prescribed for me?      Information provided by:  www.cdc.gov/getsmart  U.S. Department of Health and Human Services  Centers for disease Control and Prevention  National Center for Emerging and Zoonotic Infectious Diseases  Division of Healthcare Quality Promotion             Medication List: This is a list of all your medications and when to take them. Check marks below indicate your daily home schedule. Keep this list as a reference.      Medications           Morning Afternoon Evening Bedtime As Needed    ACETAMINOPHEN PO   650 mg by Per Feeding Tube route every 4 hours as needed for pain   Last time this was given:  1,000 mg on 7/5/2018 12:39 AM                                albuterol (2.5 MG/3ML) 0.083% neb solution   Take 1 vial by nebulization every 4 hours as needed for shortness of breath / dyspnea or wheezing                                amoxicillin-clavulanate 500-125 MG per tablet   Commonly known as:  AUGMENTIN   1 tablet by Per G Tube route 3 times daily for 6 days                                aspirin 10 mg/mL Susp   8.1 mLs (81 mg) by Per J Tube route daily                                bacitracin ointment   Apply topically daily as needed for wound care To PEG site.                                BENEFIBER PO   Take by mouth daily                                bisacodyl 10 MG Suppository   Commonly known as:  DULCOLAX   Place 1 suppository (10 mg) rectally daily as needed for  constipation                                BRIVIACT 10 MG/ML solution   Take 100 mg by mouth 2 times daily   Last time this was given:  100 mg on 7/8/2018 10:01 AM   Generic drug:  Brivaracetam                                calcium carbonate 1250 (500 Ca) MG/5ML Susp suspension   5 mLs (1,250 mg) by Per J Tube route 3 times daily (with meals)                                carBAMazepine 100 MG/5ML suspension   Commonly known as:  TEGretol   Take 150 mg by mouth every 6 hours   Last time this was given:  150 mg on 7/8/2018  8:41 AM                                * hydrocortisone 2 mg/mL Susp   Commonly known as:  CORTEF   10 mLs (20 mg) by Per J Tube route every morning                                * hydrocortisone 2 mg/mL Susp   Commonly known as:  CORTEF   Take 5 mLs (10 mg) by mouth every evening                                levothyroxine 25 mcg/mL Susp   Commonly known as:  SYNTHROID   5 mLs (125 mcg) by Per J Tube route every morning (before breakfast)                                melatonin 1 MG/ML Liqd liquid   6 mg by Jejunal Tube route nightly as needed for sleep                                multivitamin  peds with iron 60 MG chewable tablet   Take 1 chew tab by mouth daily                                * pantoprazole 2 mg/mL Susp suspension   Commonly known as:  PROTONIX   Take 10 mg by mouth daily   Last time this was given:  10 mg on 7/8/2018  6:54 AM                                * pantoprazole 2 mg/mL Susp suspension   Commonly known as:  PROTONIX   Take 10 mg by mouth every evening   Last time this was given:  10 mg on 7/8/2018  6:54 AM                                potassium & sodium phosphates 280-160-250 MG Packet   Commonly known as:  NEUTRA-PHOS   Take 1 packet by mouth 3 times daily 0900, 1500, 2100.   Last time this was given:  1 packet on 7/8/2018  8:39 AM                                testosterone cypionate 200 MG/ML injection   Commonly known as:  DEPOTESTOTERONE   Inject 100 mg  into the muscle See Admin Instructions Every 2 weeks.                                VITAMIN D (CHOLECALCIFEROL) PO   Take 4,000 Units by mouth                                * Notice:  This list has 4 medication(s) that are the same as other medications prescribed for you. Read the directions carefully, and ask your doctor or other care provider to review them with you.

## 2018-07-05 NOTE — PROGRESS NOTES
[place] - Suicide Severity Rating Scale Completed? Yes  If yes, what color did the patient score? Negative

## 2018-07-05 NOTE — CONSULTS
CLINICAL NUTRITION SERVICES  -  ASSESSMENT NOTE      RECOMMENDATIONS FOR MD/PROVIDER TO ORDER:   When IVF off, recommend increase H20 flushes via FT to meet fluid needs, ie 240 mL every 6 hrs (960 mL) for total fluid 2054 mL (27 mL/kg).   Recommendations Ordered by Registered Dietitian (RD):   Restart TF Isosource 1.5 at 40 mL/hr;  After 6 hrs increase to 60 mL/hr = 2160 kcals, 98 gm pro, 1094 mL H20, 22 gm fiber, 253 gm CHO  Nutrisource Fiber 1 packet TID = 45 kcals, 9 gm fiber.  Total (TF + NUtrisource Fiber):  2205 kcals (29 kcal/kg), 31 gm fiber.  Free H20 flushes 60 mL every 4 hrs (while on IVF)   Malnutrition:   % Weight Loss:  None noted  % Intake:  Unable to determine without nutrition history  Subcutaneous Fat Loss:  None observed  Muscle Loss:  None observed  Fluid Retention:  None noted    Malnutrition Diagnosis: Patient does not meet two of the above criteria necessary for diagnosing malnutrition        REASON FOR ASSESSMENT  Keyon Farias is a 57 year old male seen by Registered Dietitian for Provider Order - Registered Dietitian to Assess and Order TF per Medical Nutrition protocol      NUTRITION HISTORY  - Information obtained from EPIC records as pt was sleeping and mother Savannah not available.  Patient is well known to our services.  He has been on TF since August 2016, and his G-J tube was exchanged 3/13/18 (18 Fr TORY).  Per RN note in May 2018, pt has been on a daytime feeding schedule from 7 AM to 9 PM:  Isosource 1.5, 5.5 cans daily. Savannah was not sure of the rate or H2O flushes.  He also received Nutrisource Fiber, 1 packet TID.  This regimen provided 2063 jamel (27 jamel/kg), 94 gm protein (1.2 gm/kg), 1045 mL H2O, 30 gm fiber, 242 gm CHO.    Per Med Rec done by Pharmacy, pt also on the following:  MVI Peds with Fe - 1 tab daily  Neutra-Phos 1 packet TID  Vit D 4,000 Internation Units daily  Benefiber daily    SLP has followed pt in the past (home care visits) and has recommended NPO.    Per ED  "note, his mom stated that the patient did have a couple episodes of loose stool but stated that this could be secondary to some prune juice and metamucil.      CURRENT NUTRITION ORDERS  Diet Order:     NPO   Was asked to resume TF via J-FT today.    Current Intake/Tolerance:  Stool x1 today.      PHYSICAL FINDINGS  Observed  No nutrition-related physical findings observed  Obtained from Chart/Interdisciplinary Team  None noted    ANTHROPOMETRICS  Height: 6' 0\"  Weight:  75.9 kg (167 lbs 5.27 oz)  Body mass index is 22.69 kg/(m^2).  Weight Status:  Normal BMI  IBW:  80.9 kg  % IBW:  94%  Weight History:  Weight ranges between 155-170 lbs and current wt is at upper end of usual range.    Wt Readings from Last 10 Encounters:   07/05/18 75.9 kg (167 lb 5.3 oz)   05/19/18 71.8 kg (158 lb 3.2 oz)   05/05/18 77.4 kg (170 lb 10.2 oz)   04/04/18 72.6 kg (160 lb)   03/13/18 70.3 kg (155 lb)   02/21/18 73 kg (161 lb)   12/31/17 76.2 kg (168 lb)   10/07/17 77.3 kg (170 lb 6.7 oz)   07/27/17 73.9 kg (163 lb)   06/15/17 70.3 kg (155 lb)       LABS  Labs reviewed  No record Mg or Phos so have ordered add ons.    MEDICATIONS  Medications reviewed  IVF NaCl at 75 mL/hr - for fluid delivery  Norepinephrine off today  Solucorfet    ASSESSED NUTRITION NEEDS PER APPROVED PRACTICE GUIDELINES:    Dosing Weight:  75.9 kg (admit wt)   Estimated Energy Needs:  9215-0814 kcals (25-30 Kcal/Kg)  Justification: maintenance  Estimated Protein Needs:   grams protein (1.2-1.5 g pro/Kg)  Justification: hypercatabolism with acute illness  Estimated Fluid Needs:  9970-5431 mL (1 mL/Kcal)  Justification: maintenance    MALNUTRITION:  % Weight Loss:  None noted  % Intake:  Unable to determine without nutrition history  Subcutaneous Fat Loss:  None observed  Muscle Loss:  None observed  Fluid Retention:  None noted    Malnutrition Diagnosis: Patient does not meet two of the above criteria necessary for diagnosing malnutrition    NUTRITION " DIAGNOSIS:  Inadequate enteral nutrition infusion related to TF on hold as evidenced by TF meeting 0% estimated needs with plan to resume today      NUTRITION INTERVENTIONS  Recommendations / Nutrition Prescription  Restart TF Isosource 1.5 at 40 mL/hr;  After 6 hrs increase to 60 mL/hr = 2160 kcals, 98 gm pro, 1094 mL H20, 22 gm fiber, 253 gm CHO  Nutrisource Fiber 1 packet TID = 45 kcals, 9 gm fiber.  Total (TF + NUtrisource Fiber):  2205 kcals (29 kcal/kg), 31 gm fiber.  Free H20 flushes 60 mL every 4 hrs (while on IVF)    When IVF off, recommend increase H20 flushes via FT to meet fluid needs, ie 240 mL every 6 hrs (960 mL) for total fluid 2054 mL (27 mL/kg).      Implementation  Nutrition education: Not appropriate at this time due to patient condition  Collaboration and Referral of Nutrition care - Discussed pt during ICU interdisciplinary rounds this morning and received approval from Dr. Dalton to resume TF today.  EN Composition, EN Schedule - Entered TF orders as above  Feeding Tube Flush - Ordered standard flushes for now.    Nutrition Goals  TF Isosource 1.5 at 60 mL/hr will gloria % estimated needs.    MONITORING AND EVALUATION:  Progress towards goals will be monitored and evaluated per protocol and Practice Guidelines    Lisa Garcia, RD, LD, CNSC

## 2018-07-05 NOTE — IP AVS SNAPSHOT
[facility] [address] [facility]    [address] [place] [address]    Phone:  [phone]                                       After Visit Summary   7/5/2018    Keyon Farias    MRN: [id number]           After Visit Summary Signature Page     I have received my discharge instructions, and my questions have been answered. I have discussed any challenges I see with this plan with the nurse or doctor.    ..........................................................................................................................................  Patient/Patient Representative Signature      ..........................................................................................................................................  Patient Representative Print Name and Relationship to Patient    ..................................................               ................................................  Date                                            Time    ..........................................................................................................................................  Reviewed by Signature/Title    ...................................................              ..............................................  Date                                                            Time

## 2018-07-05 NOTE — ED PROVIDER NOTES
History     Chief Complaint:    Fever       HPI   Keyon Farias is a 57 year old male with a history of pneumonia, pancreatitis, sepsis, and a TBI who presents to the ED for evaluation of a fever. The patient's mom reports that the she checked the patients temperature around 2000 because the patient was coughing but stated that the patient was afebrile at this time. Later in the night around 2300, the patient's mom took his temperature again but this time the patient was febrile with a temperature of 103 on their home thermometer. His mom states that the patient did have a couple episodes of loose stool but states that this could be secondary to some prune juice and metamucil. The patient denies feeling any symptoms of pain at this time. He has not received any medication for his fever prior to presenting to the ED.     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    Social History:  Former smoker, quit 4/23/1989.  Negative for alcohol use.  Marital Status:  Single [1]       Review of Systems   Unable to perform ROS: Other   Constitutional: Positive for fever.   Respiratory: Positive for cough.    Musculoskeletal: Negative for myalgias.         Physical Exam   First  Vitals:  BP: 109/62  Pulse: 126  Heart Rate: 126  Temp: 102.4  F (39.1  C)  Resp: 22  Height: 182.9 cm (6')  Weight: 77.1 kg (170 lb)  SpO2: 95 %      Physical Exam   Constitutional:  Appears well-developed and well-nourished. Cooperative.   HENT:   Head:    Atraumatic.   Mouth/Throat:   Oropharynx is without erythema or exudate and mucous     membranes are moist.   Eyes:    Conjunctivae normal and EOM are normal.      Pupils are equal, round, and reactive to light.   Neck:    Normal range of motion. Neck supple.   Cardiovascular:  Tachycardic, regular rhythm, normal heart sounds and radial and    dorsalis pedis pulses are 2+ and symmetric.    Pulmonary/Chest:  Dim breath sounds both lung bases with right worse than left.   Abdominal:   Soft. Bowel sounds are normal.      No splenomegaly or hepatomegaly. No tenderness. No rebound.   Musculoskeletal:  Normal range of motion. No Calf tenderness. Extension      contractures both ankles. Trace edema to both lower extremities.    Neurological:  Alert. Nonverbal. Good eye contact.  Skin:    Skin is warm and dry. Psoriatic rash on right forearm and bilaterally on    Shins. Healed tracheostomy scar on his anterior neck.   Psychiatric:   Normal mood and affect.         Emergency Department Course   Imaging:  Radiographic findings were communicated with the patient who voiced understanding of the findings.  XR Chest PA & LAT:   Shallow inspiration. Mild right base atelectasis or  infiltrate. Normal heart size as per radiology.       Laboratory:  CBC: WBC: 12.2 (H), HGB: 14.2, PLT: 147 (L)  CMP: Glucose 101 (H), Albumin 3.3 (L), o/w WNL (Creatinine: 0.87)    UA with Microscopic: Protein albumin 10 (A), Leukocyte esterase Trace (A), Mucous Present (A), Amorphous crystals Few (A), o/w WNL  Urine culture: pending  0036 Lactate: 3.0 (HH)  0207 Lactic acid: 2.6 (H)  Blood gas venous: Bicarbonate 32 (H), o/w WNL  Blood cultures (X2): pending  TSH: <0.01 (L)  T4 Free: 1.66  (H)    Procedures:    Central Line Placement     Procedure:  Central Line Placement with Ultrasound Guidance.    Indications: Sepsis, vasopressors     Consent:  Risks (including but not limited to: pneumothorax,bleeding, infection or artery puncture), benefits and alternatives were discussed with  Patient and his mom and consent for procedure was obtained.    Timeout:  Universal protocol was followed. TIME OUT conducted just prior to starting procedure confirmed patient identity, site/side, procedure, patient position, and availability of correct equipment and implants.?  Yes    Procedure note:  Right Internal Jugular approach was selected and the right anterior neck was prepped, cleansed and draped in a sterile fashion.  Mask, gown and gloves were used per sterile protocol.  Patient was then placed into Trendelenburg position and lidocaine was used for local anesthesia.  Vascular probe with ultrasound was used in a sterile fashion for guidence.  Introducer needle was then used to gain access to the central venous circulation.  Using Seldinger technique the  Triple lumen catheter was placed.  Catheter port(s) were aspirated and flushed.  Central line was sutured in place and sterile dressing applied.   Appropriate placement was confirmed by x-ray and no pneumothorax was visualized.    Patient Status:  Patient tolerated the procedure well.  There were no complications.              Interventions:  0026 NS 2,154 mLs IV  0039 Tylenol 1,000 mg IV  0141 Zosyn 4.5g IV  0200 NS 1,000 mLs IV  0246  mLs IV  0349 Levophed 16 mg in D5W (0.1 mcg/kg/min x 77.1 kg) IV  0350  mLs IB  Please see MAR for full list of medications administered in the ED.    Emergency Department Course:  Nursing notes and vitals reviewed. (0020) I performed an exam of the patient as documented above.     IV inserted. Medicine administered as documented above. Blood drawn. This was sent to the lab for further testing, results above.    The  patient provided a urine sample here in the emergency department. This was sent for laboratory testing, findings above.     The patient was sent for a Chest XR while in the emergency department, findings above.     (0300) I rechecked the patient and discussed the results of his workup thus far.     A central line was placed as noted above.     (0355)  I consulted with Dr. Carrillo, Intensivist. They are in agreement to accept the patient for admission.    Findings and plan explained to the Patient and his mother who consents to admission. Discussed the patient with Dr. Carrillo, who will admit the patient to a ICU bed for further monitoring, evaluation, and treatment.          Impression & Plan    CMS Diagnoses:   The patient has signs of Severe Sepsis as evidenced by:    1. 2 SIRS criteria, AND  2. Suspected infection, AND   3. Organ dysfunction: Lactic Acid > 1.9    Time severe sepsis diagnosis confirmed = 0036 as this was the time when Lactate resulted, and the level was > 1.9      3 Hour Severe Sepsis Bundle Completion:  1. Initial Lactic Acid Result:   Recent Labs   Lab Test  07/05/18   0620  07/05/18   0201  05/16/18 2025   LACT  4.6*  2.6*  1.7     2. Blood Cultures before Antibiotics: Yes  3. Broad Spectrum Antibiotics Administered: Yes     Anti-infectives     None        4. 3500+ ml of IV fluids.  Ideal body weight: 77.6 kg (171 lb 1.2 oz)    Severe Sepsis reassessment:  1. Repeat Lactic Acid Level: 2.6  2. Vasopressors started for Persistent Hypotension defined by the last 2 BP readings within the ONE HOUR follwing completion of the 30mL/kg bolus being low (SBP <90 or MAP <65).  I attest to having performed a repeat sepsis exam and assessment of perfusion at 0345 and the results demonstrate improved perfusion.            Medical Decision Making:  Patient presents with fever and cough. The patient has a history of TBI and gets his feedings through a G-tube. He's had recurrent bouts of aspiration pneumonia.  His mom is concerned that this is what has happened again.    On initial exam he is tachycardic and febrile with soft blood pressures. Sepsis labs were ordered including blood culture, urinalysis, and urine cultures. The UA does not suggest infection. White cell count is appropriately elevated. Initial serum lactate is elevated at 3.0. After his 30 cc/kg bolus, lactate has improved but only to 2.6. CMP has returned looking fairly unremarkable except for a borderline low albumin. Chest XR shows streaky atelectasis at the right lung base.     Patient's O2 saturations are borderline low in the 90-93 range. I'll treat him with Zosyn for aspiration pneumonia. His blood pressures remain low despite aggressive fluid resuscitation. I discussed placing a central line with the patient's mother and she agreed. Central line was placed without difficulty and levophed was started. Blood pressure is improved.    I talked to Dr. Carrillo, on call for ICU, and she agreed to accept the patient for admission. Patient is admitted in guarded condition.   Critical Care time:  was 40 minutes for this patient excluding procedures.    Diagnosis:    ICD-10-CM    1. Aspiration pneumonia (H) J69.0 Blood culture     Blood culture     Procalcitonin     Glucose by meter     Glucose by meter     Lactic acid whole blood   2. Severe sepsis (H) A41.9     R65.20        Disposition:  Admitted to ICU      Scribe Disclosure:  I,  Yaron Aldridge, am serving as a scribe on 7/5/2018 at 12:20 AM to personally document services performed by Robbie Miller MD based on my observations and the provider's statements to me.          Yaron Aldridge  7/5/2018    EMERGENCY DEPARTMENT       Robbie Miller MD  07/05/18 0912

## 2018-07-05 NOTE — PHARMACY-ADMISSION MEDICATION HISTORY
Admission medication history interview status for the 7/5/2018  admission is complete. See EPIC admission navigator for prior to admission medications     Medication history source reliability:Good    Actions taken by pharmacist (provider contacted, etc): spoke to mother     Additional medication history information not noted on PTA med list :None    Medication reconciliation/reorder completed by provider prior to medication history? No    Time spent in this activity: 20 minutes    Prior to Admission medications    Medication Sig Last Dose Taking? Auth Provider   ACETAMINOPHEN  mg by Per Feeding Tube route every 4 hours as needed for pain prn med Yes Unknown, Entered By History   albuterol (2.5 MG/3ML) 0.083% neb solution Take 1 vial by nebulization every 4 hours as needed for shortness of breath / dyspnea or wheezing prn med Yes Unknown, Entered By History   aspirin 10 mg/mL 8.1 mLs (81 mg) by Per J Tube route daily 7/4/2018 at Unknown time Yes Mariana Venegas MD   bacitracin ointment Apply topically daily as needed for wound care To PEG site. prn med Yes Unknown, Entered By History   bisacodyl (DULCOLAX) 10 MG Suppository Place 1 suppository (10 mg) rectally daily as needed for constipation prn med Yes Mariana Venegas MD   Brivaracetam (BRIVIACT) 10 MG/ML solution Take 100 mg by mouth 2 times daily 7/4/2018 at x2 Yes Reported, Patient   calcium carbonate 1250 (500 CA) MG/5ML SUSP suspension 5 mLs (1,250 mg) by Per J Tube route 3 times daily (with meals) 7/4/2018 at x3 Yes Mariana Venegas MD   carBAMazepine (TEGRETOL) 100 MG/5ML suspension Take 150 mg by mouth every 6 hours 7/5/2018 at [place] Yes Unknown, Entered By History   hydrocortisone (CORTEF) 2 mg/mL 10 mLs (20 mg) by Per J Tube route every morning 7/4/2018 at Unknown time Yes Mariana Venegas MD   hydrocortisone (CORTEF) 2 mg/mL Take 5 mLs (10 mg) by mouth every evening 7/4/2018 at Unknown time Yes Mariana Venegas  MD [place]   levothyroxine (SYNTHROID) 25 mcg/mL 6 mLs (150 mcg) by Per J Tube route every morning (before breakfast)  Patient taking differently: 150 mcg by Per J Tube route every morning (before breakfast) Hold tube feeding 1 hour prior and 1 hour after medication administered. 7/4/2018 at Unknown time Yes Mariana Venegas MD   melatonin (MELATONIN) 1 MG/ML LIQD liquid 6 mg by Jejunal Tube route nightly as needed for sleep prn med Yes Unknown, Entered By History   multivitamin  peds with iron (FLINTSTONES COMPLETE) 60 MG chewable tablet Take 1 chew tab by mouth daily 7/4/2018 at Unknown time Yes Unknown, Entered By History   pantoprazole (PROTONIX) SUSP suspension Take 10 mg by mouth daily 7/4/2018 at Unknown time Yes Unknown, Entered By History   pantoprazole (PROTONIX) SUSP suspension Take 10 mg by mouth every evening 7/4/2018 at Unknown time Yes Unknown, Entered By History   potassium & sodium phosphates (NEUTRA-PHOS) 280-160-250 MG Packet Take 1 packet by mouth 3 times daily 0900, 1500, 2100.  7/4/2018 at x3 Yes Unknown, Entered By History   testosterone cypionate (DEPOTESTOTERONE CYPIONATE) 200 MG/ML injection Inject 100 mg into the muscle See Admin Instructions Every 2 weeks. 6/22/2018 Yes Unknown, Entered By History   VITAMIN D, CHOLECALCIFEROL, PO Take 4,000 Units by mouth  7/4/2018 at Unknown time Yes Unknown, Entered By History   Wheat Dextrin (BENEFIBER PO) Take by mouth daily 7/4/2018 at Unknown time Yes Unknown, Entered By History   Ingris Montana, JustinD

## 2018-07-06 ENCOUNTER — APPOINTMENT (OUTPATIENT)
Dept: INTERVENTIONAL RADIOLOGY/VASCULAR | Facility: CLINIC | Age: 58
DRG: 871 | End: 2018-07-06
Attending: INTERNAL MEDICINE
Payer: MEDICARE

## 2018-07-06 PROBLEM — Z71.89 ACP (ADVANCE CARE PLANNING): Chronic | Status: ACTIVE | Noted: 2018-07-06

## 2018-07-06 LAB
ANION GAP SERPL CALCULATED.3IONS-SCNC: 8 MMOL/L (ref 3–14)
ANION GAP SERPL CALCULATED.3IONS-SCNC: 9 MMOL/L (ref 3–14)
BACTERIA SPEC CULT: NO GROWTH
BUN SERPL-MCNC: 6 MG/DL (ref 7–30)
BUN SERPL-MCNC: 7 MG/DL (ref 7–30)
CALCIUM SERPL-MCNC: 8.5 MG/DL (ref 8.5–10.1)
CALCIUM SERPL-MCNC: 8.7 MG/DL (ref 8.5–10.1)
CHLORIDE SERPL-SCNC: 114 MMOL/L (ref 94–109)
CHLORIDE SERPL-SCNC: 119 MMOL/L (ref 94–109)
CO2 SERPL-SCNC: 24 MMOL/L (ref 20–32)
CO2 SERPL-SCNC: 30 MMOL/L (ref 20–32)
CREAT SERPL-MCNC: 0.74 MG/DL (ref 0.66–1.25)
CREAT SERPL-MCNC: 0.82 MG/DL (ref 0.66–1.25)
ERYTHROCYTE [DISTWIDTH] IN BLOOD BY AUTOMATED COUNT: 13.8 % (ref 10–15)
GFR SERPL CREATININE-BSD FRML MDRD: >90 ML/MIN/1.7M2
GFR SERPL CREATININE-BSD FRML MDRD: >90 ML/MIN/1.7M2
GLUCOSE SERPL-MCNC: 111 MG/DL (ref 70–99)
GLUCOSE SERPL-MCNC: 139 MG/DL (ref 70–99)
HCT VFR BLD AUTO: 36.5 % (ref 40–53)
HGB BLD-MCNC: 12 G/DL (ref 13.3–17.7)
LACTATE BLD-SCNC: 1.9 MMOL/L (ref 0.7–2)
LACTATE BLD-SCNC: 2.6 MMOL/L (ref 0.4–1.9)
Lab: NORMAL
MCH RBC QN AUTO: 30.3 PG (ref 26.5–33)
MCHC RBC AUTO-ENTMCNC: 32.9 G/DL (ref 31.5–36.5)
MCV RBC AUTO: 92 FL (ref 78–100)
PLATELET # BLD AUTO: 130 10E9/L (ref 150–450)
POTASSIUM SERPL-SCNC: 3.4 MMOL/L (ref 3.4–5.3)
POTASSIUM SERPL-SCNC: 3.8 MMOL/L (ref 3.4–5.3)
RBC # BLD AUTO: 3.96 10E12/L (ref 4.4–5.9)
SODIUM SERPL-SCNC: 152 MMOL/L (ref 133–144)
SODIUM SERPL-SCNC: 152 MMOL/L (ref 133–144)
SPECIMEN SOURCE: NORMAL
WBC # BLD AUTO: 13 10E9/L (ref 4–11)

## 2018-07-06 PROCEDURE — 25800025 ZZH RX 258: Performed by: INTERNAL MEDICINE

## 2018-07-06 PROCEDURE — 25000128 H RX IP 250 OP 636: Performed by: INTERNAL MEDICINE

## 2018-07-06 PROCEDURE — 36415 COLL VENOUS BLD VENIPUNCTURE: CPT | Performed by: INTERNAL MEDICINE

## 2018-07-06 PROCEDURE — 12000000 ZZH R&B MED SURG/OB

## 2018-07-06 PROCEDURE — 25000132 ZZH RX MED GY IP 250 OP 250 PS 637: Mod: GY | Performed by: INTERNAL MEDICINE

## 2018-07-06 PROCEDURE — 83605 ASSAY OF LACTIC ACID: CPT | Performed by: INTERNAL MEDICINE

## 2018-07-06 PROCEDURE — C1769 GUIDE WIRE: HCPCS

## 2018-07-06 PROCEDURE — 85027 COMPLETE CBC AUTOMATED: CPT | Performed by: INTERNAL MEDICINE

## 2018-07-06 PROCEDURE — A9270 NON-COVERED ITEM OR SERVICE: HCPCS | Mod: GY | Performed by: INTERNAL MEDICINE

## 2018-07-06 PROCEDURE — 25000132 ZZH RX MED GY IP 250 OP 250 PS 637: Mod: GY | Performed by: NURSE PRACTITIONER

## 2018-07-06 PROCEDURE — 99232 SBSQ HOSP IP/OBS MODERATE 35: CPT | Performed by: INTERNAL MEDICINE

## 2018-07-06 PROCEDURE — 27210905 ZZH KIT CR7

## 2018-07-06 PROCEDURE — 0D20XUZ CHANGE FEEDING DEVICE IN UPPER INTESTINAL TRACT, EXTERNAL APPROACH: ICD-10-PCS | Performed by: RADIOLOGY

## 2018-07-06 PROCEDURE — 80048 BASIC METABOLIC PNL TOTAL CA: CPT | Performed by: INTERNAL MEDICINE

## 2018-07-06 PROCEDURE — 27210815 ZZ H TUBE GASTRO CR13

## 2018-07-06 PROCEDURE — 49452 REPLACE G-J TUBE PERC: CPT

## 2018-07-06 PROCEDURE — A9270 NON-COVERED ITEM OR SERVICE: HCPCS | Mod: GY | Performed by: NURSE PRACTITIONER

## 2018-07-06 RX ORDER — POLYETHYLENE GLYCOL 3350 17 G/17G
17 POWDER, FOR SOLUTION ORAL DAILY
Status: DISCONTINUED | OUTPATIENT
Start: 2018-07-06 | End: 2018-07-08 | Stop reason: HOSPADM

## 2018-07-06 RX ORDER — BISACODYL 10 MG
10 SUPPOSITORY, RECTAL RECTAL DAILY PRN
Status: DISCONTINUED | OUTPATIENT
Start: 2018-07-06 | End: 2018-07-08 | Stop reason: HOSPADM

## 2018-07-06 RX ORDER — LIDOCAINE HYDROCHLORIDE 10 MG/ML
INJECTION, SOLUTION INFILTRATION; PERINEURAL
Status: DISCONTINUED
Start: 2018-07-06 | End: 2018-07-06 | Stop reason: HOSPADM

## 2018-07-06 RX ORDER — DEXTROSE MONOHYDRATE, SODIUM CHLORIDE, AND POTASSIUM CHLORIDE 50; 1.49; 4.5 G/1000ML; G/1000ML; G/1000ML
INJECTION, SOLUTION INTRAVENOUS CONTINUOUS
Status: DISCONTINUED | OUTPATIENT
Start: 2018-07-06 | End: 2018-07-07

## 2018-07-06 RX ADMIN — POTASSIUM & SODIUM PHOSPHATES POWDER PACK 280-160-250 MG 1 PACKET: 280-160-250 PACK at 22:37

## 2018-07-06 RX ADMIN — POTASSIUM & SODIUM PHOSPHATES POWDER PACK 280-160-250 MG 1 PACKET: 280-160-250 PACK at 08:48

## 2018-07-06 RX ADMIN — CEFTRIAXONE SODIUM 2 G: 2 INJECTION, POWDER, FOR SOLUTION INTRAMUSCULAR; INTRAVENOUS at 09:51

## 2018-07-06 RX ADMIN — BRIVARACETAM 100 MG: 10 SOLUTION ORAL at 08:49

## 2018-07-06 RX ADMIN — Medication 1 PACKET: at 19:03

## 2018-07-06 RX ADMIN — HYDROCORTISONE 20 MG: 20 TABLET ORAL at 08:48

## 2018-07-06 RX ADMIN — POTASSIUM & SODIUM PHOSPHATES POWDER PACK 280-160-250 MG 1 PACKET: 280-160-250 PACK at 16:41

## 2018-07-06 RX ADMIN — CARBAMAZEPINE 150 MG: 100 SUSPENSION ORAL at 04:55

## 2018-07-06 RX ADMIN — CARBAMAZEPINE 150 MG: 100 SUSPENSION ORAL at 11:16

## 2018-07-06 RX ADMIN — SODIUM CHLORIDE: 9 INJECTION, SOLUTION INTRAVENOUS at 00:42

## 2018-07-06 RX ADMIN — CARBAMAZEPINE 150 MG: 100 SUSPENSION ORAL at 16:41

## 2018-07-06 RX ADMIN — CARBAMAZEPINE 150 MG: 100 SUSPENSION ORAL at 22:37

## 2018-07-06 RX ADMIN — BRIVARACETAM 100 MG: 10 SOLUTION ORAL at 20:41

## 2018-07-06 RX ADMIN — ENOXAPARIN SODIUM 40 MG: 40 INJECTION SUBCUTANEOUS at 14:29

## 2018-07-06 RX ADMIN — Medication 10 MG: at 08:50

## 2018-07-06 RX ADMIN — HYDROCORTISONE 10 MG: 10 TABLET ORAL at 20:31

## 2018-07-06 RX ADMIN — LEVOTHYROXINE SODIUM 150 MCG: 150 TABLET ORAL at 07:17

## 2018-07-06 RX ADMIN — Medication 1 PACKET: at 08:50

## 2018-07-06 RX ADMIN — ASPIRIN 81 MG 81 MG: 81 TABLET ORAL at 08:48

## 2018-07-06 RX ADMIN — POTASSIUM CHLORIDE, DEXTROSE MONOHYDRATE AND SODIUM CHLORIDE: 150; 5; 450 INJECTION, SOLUTION INTRAVENOUS at 13:21

## 2018-07-06 RX ADMIN — Medication 1 PACKET: at 22:38

## 2018-07-06 RX ADMIN — HYDROCORTISONE SODIUM SUCCINATE 50 MG: 100 INJECTION, POWDER, FOR SOLUTION INTRAMUSCULAR; INTRAVENOUS at 01:01

## 2018-07-06 RX ADMIN — POLYETHYLENE GLYCOL 3350 17 G: 17 POWDER, FOR SOLUTION ORAL at 20:31

## 2018-07-06 RX ADMIN — Medication 4000 UNITS: at 08:50

## 2018-07-06 RX ADMIN — Medication 10 MG: at 20:31

## 2018-07-06 NOTE — PROGRESS NOTES
Patient is open to RN only with [phone]. When we know he is going to discharge we need to call them and orders can be faxed to [phone]. I did tell them he could be released over the weekend      Patient is open to [facility] health for [phone]. When d/c fax orders to [phone]

## 2018-07-06 NOTE — PROGRESS NOTES
Pt's Mother (Savannah Farias) was called relating to missing Health Care Agent documents. She confirms that she is the Healthcare agent and will search for the documents at home and during her visit tomorrow will bring them so they can be scanned into his chart.

## 2018-07-06 NOTE — PLAN OF CARE
Problem: Patient Care Overview  Goal: Plan of Care/Patient Progress Review  Outcome: No Change  HECTOR orientation. VSS on RA. Pt appears happy, giving high fives to staff. Crackles at bases of lungs, infrequent nonproductive cough. Incontinent, adequate output. No BM, +BS in all four quadrants, passing flatus. NPO. Enteral feed in J tube at 60ml/hr and free water flush at 60ml q4hr, at 0900 J-tube clogged, attempts with coke and hot tea to unclog but unsuccessful. IR exchanged J-tube. Meds given through G-tube. Tele Sinus tach. L PIV infusing. Total cares, T/R Q2H. Coccyx red, blanchable, skin intact. Dressing on R neck, CDI. Contact Iso, seizure precautions maintained. Will  continue to monitor.

## 2018-07-06 NOTE — PROGRESS NOTES
[facility]    Internal Medicine Hospitalist Progress Note  07/06/2018  I evaluated patient on the above date.    Luis Westfall Jr., MD  [phone] (p)  Text Page      Assessment & Plan   Keyon Farias is a 57 year old male with history including TBI with aphasia, R sided spastic hemiplegia and dysphagia with G-tube for TFs/meds; seizure disorder; panhypopituitarism; and frequent hospital admissions for recurrent pneumonia (most recently from 5/1/18 - 5/5/18, then again from 5/15/18 - 5/19/18); who was admitted to the ICU on 7/5/2018 for management of septic shock suspected secondary to recurrent pneumonia requiring vasopressors. Was able to wean off Levophed after several hours and transferred to Hospitalist service 7/5.     Recurrent HCAP with septic shock.  Brought to ED for evaluation 7/5 for fever which was first noted at 2000 on 7/4 PM with Tmax 103. Has known history of recurrent pneumonias prompting frequent hospitalizations. Febrile in the ED with T 103, tachycardic and hypotensive; WBC 12.2, Lactate 2.6 on presentation but peaked at 4.6; procal 0.16 (low risk systemic infection); CXR showed a mild R basilar infiltrate vs atelectasis. Started on Zosyn 7/5. Given 5L IVFs and ultimately started on pressors (Levophed); also given stress dose steroids. Abx initially held per intensitivist given clinical picture of possible pneumonitis but was ultimately placed on Rocephin. Afebrile since early am 7/5.  Micro: BC's 7/5 NGTD.  7/5 NG.  - Continue ceftriaxone (started 7/5).  - Continue PRN nebs.  - Monitor cultures.    Hypernatremia, suspect related to hypertonic fluids and decreased water intake.  Sodium increased to 152 on 7/6, was normal on 7/5. GJ tube clogged as below 7/6.   Recent Labs  Lab 07/06/18  1035 07/05/18  0021   * 136   - D/C NS.  - Start D5 1/2 NS with 20 meq KCl at 100 ml/hr for now.  - GJ tube to be exchanged as below.  - Monitor BMP.      TBI with aphasia.  Dysphagia with  GJ-tube for TFs.  Patient's mother is his caregiver, along with home care attendants. Has frequent hospitalization for recurrent pneumonias. GJ clogged 7/6 despite declogging measures.   - IR GJ tube evaluation/exchange.  - Resume TF's as able.      Seizure disorder.  Secondary to TBI. Chronic and stable on PTA AEDs.  -  Continue brivaracetml and carbamazepine.      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 Placed on stress dosed steroids in ICU. As above.  - Resume PTA hydrocortisone (20mg qAM and 10mg qPM) today 7/6.  - Continue levothyroxine.  - Takes testosterone q2 wks.     GERD.  - Continue Protonix.    Prophylaxis.  - PCD's, ambulation.  - Lovenox.    CODE STATUS: FULL.    Dispo.  - 1-2 more days.    # Pain Assessment:  Current Pain Score 7/6/2018   Patient currently in pain? HECTOR   Pain score (0-10) -   Pain location -   Pain descriptors -   rFLACC pain score -   CPOT pain score -   Keyon liriano pain level was assessed and he currently denies pain.        Interval History   Non verbal, but able to respond to questions.    -Data reviewed today: I reviewed all new labs and imaging over the last 24 hours. I personally reviewed no images or EKG's today.    Physical Exam   Heart Rate: 117, Blood pressure 146/88, pulse 101, temperature 98.8  F (37.1  C), temperature source Oral, resp. rate 20, height 1.829 m (6'), weight 74.5 kg (164 lb 3.9 oz), SpO2 97 %.  Vitals:    07/05/18 0023 07/05/18 0515 07/06/18 0645   Weight: 77.1 kg (170 lb) 75.9 kg (167 lb 5.3 oz) 74.5 kg (164 lb 3.9 oz)     Vital Signs with Ranges  Temp:  [97.3  F (36.3  C)-99.1  F (37.3  C)] 98.8  F (37.1  C)  Pulse:  [101] 101  Heart Rate:  [] 117  Resp:  [11-28] 20  BP: ()/(56-88) 146/88  SpO2:  [95 %-99 %] 97 %  Patient Vitals for the past 24 hrs:   BP Temp Temp src Pulse Heart Rate Resp SpO2 Weight   07/06/18 0807 146/88 98.8  F (37.1  C) Oral - 117 20 97 % -   07/06/18 0645 - - - - - - - 74.5 kg (164 lb 3.9 oz)   07/06/18 0000 130/78 99.1  F  (37.3  C) Rectal - 106 22 97 % -   07/05/18 1804 121/71 97.3  F (36.3  C) Axillary 101 - 20 97 % -   07/05/18 1715 107/67 - - - 97 22 95 % -   07/05/18 1700 112/62 - - - 97 23 98 % -   07/05/18 1645 104/65 - - - 93 21 98 % -   07/05/18 1630 97/64 - - - 100 22 99 % -   07/05/18 1615 114/72 - - - 101 20 96 % -   07/05/18 1530 126/60 - - - 99 22 97 % -   07/05/18 1515 122/68 - - - 102 16 97 % -   07/05/18 1500 113/70 - - - 105 27 98 % -   07/05/18 1445 107/68 - - - 101 28 95 % -   07/05/18 1430 111/66 - - - 101 22 98 % -   07/05/18 1415 118/68 - - - 108 11 97 % -   07/05/18 1400 127/68 - - - 99 17 99 % -   07/05/18 1345 119/57 - - - 89 13 97 % -   07/05/18 1330 109/63 - - - 102 20 97 % -   07/05/18 1315 110/66 - - - 95 18 97 % -   07/05/18 1300 99/64 - - - 96 24 96 % -   07/05/18 1245 118/64 - - - 102 13 98 % -   07/05/18 1230 109/58 - - - 116 15 97 % -   07/05/18 1215 107/57 - - - 106 23 99 % -   07/05/18 1200 104/56 - - - 103 22 97 % -   07/05/18 1145 92/67 98.8  F (37.1  C) - - 116 18 98 % -   07/05/18 1130 103/66 98.8  F (37.1  C) - - 107 27 96 % -   07/05/18 1115 128/63 98.8  F (37.1  C) - - 104 24 96 % -   07/05/18 1100 111/66 99  F (37.2  C) - - 102 27 96 % -     I/O's Last 24 hours  I/O last 3 completed shifts:  In: 4863.14 [I.V.:4040.14; NG/GT:220]  Out: 4850 [Urine:4850]    Constitutional: Awake, alert, nonverbal.  Respiratory: Diminished in bases. No crackles or wheezes.  Cardiovascular: RRR no m/r/g.  GI: Soft, nt, nd, +BS.  Skin/Integumen:   Other:        Data     Recent Labs  Lab 07/05/18  0021   WBC 12.2*   HGB 14.2   MCV 89   *      POTASSIUM 4.0   CHLORIDE 97   CO2 30   BUN 15   CR 0.87   ANIONGAP 9   STEVE 8.7   *   ALBUMIN 3.3*   PROTTOTAL 7.8   BILITOTAL 0.5   ALKPHOS 96   ALT 28   AST 17     Recent Labs   Lab Test  07/05/18   0510  07/05/18   0021  05/18/18   0756  05/17/18   0645  05/16/18   0655  05/15/18   2237  05/04/18   0345   05/03/18   0219   02/21/18   0954   02/21/18    0405   GLC   --   101*  100*  91  99  86   --    < >   --    < >   --    < >   --    BGM  96   --    --    --    --    --   144*   --   173*   --   122*   --   154*    < > = values in this interval not displayed.         No results found for this or any previous visit (from the past 24 hour(s)).    Medications   All medications were reviewed.    sodium chloride 75 mL/hr at 07/06/18 0042       aspirin  81 mg Per J Tube Daily     Brivaracetam  100 mg Per J Tube BID     carBAMazepine  150 mg Oral Q6H     cefTRIAXone  2 g Intravenous Q24H     cholecalciferol  4,000 Units Per Feeding Tube Daily     enoxaparin  40 mg Subcutaneous Q24H     fiber modular  1 packet Per Feeding Tube TID     hydrocortisone  10 mg Per J Tube QPM     hydrocortisone  20 mg Per J Tube QAM     levothyroxine  150 mcg Per J Tube QAM AC     pantoprazole  10 mg Per Feeding Tube Daily     pantoprazole  10 mg Per Feeding Tube QPM     potassium & sodium phosphates  1 packet Per Feeding Tube TID     sodium chloride (PF)  10 mL Intracatheter Q8H     sodium chloride (PF)  3 mL Intravenous Q8H

## 2018-07-06 NOTE — PLAN OF CARE
Problem: Patient Care Overview  Goal: Plan of Care/Patient Progress Review  Outcome: No Change  Nonverbal, T/R Q2H, incont of urine. Tele NSR. TF patent. Lactic 2.6, house officer notified. Cont to monitor.

## 2018-07-06 NOTE — PROGRESS NOTES
[facility]    Sepsis Evaluation Progress Note    Date of Service: 07/06/2018    I was called to see Keyon Farias due to abnormal vital signs triggering the Sepsis SIRS screening alert. He is known to have an infection w/ aspiration pna on ceftriaxone.  Triggered by  & WBC 13K  Physical Exam    Vital Signs:  Temp: 98.5  F (36.9  C) Temp src: Oral BP: 129/74   Heart Rate: 94 Resp: 18 SpO2: 93 % O2 Device: None (Room air)      Lab:  Lactic Acid   Date Value Ref Range Status   07/06/2018 1.9 0.7 - 2.0 mmol/L Final     Lactate for Sepsis Protocol   Date Value Ref Range Status   07/06/2018 2.6 (HH) 0.4 - 1.9 mmol/L Final     Comment:     Critical Value called to and read back by  THIERNO BLANKENSHIP IN 66 AT 1755 KS         The patient is at baseline mental status. As per RN staff who know pt from frequent hospitalizations    The rest of their physical exam is significant for   CNS: awake, interactie, nonverbal as is his baseline  CV: S1S2 no murmur, normotensive    Lungs: RR 18, poor cough, few rhonchi JOSE MARIA/LLL  Abd: nabs s/NT, ND GJ tube LUQ  Ext warm, no mottling, no edema    qSOFA is 0  SIRS 2 for WBC (is on chronic steroids & has been on stress dose) and   LA is <4  No evidence of end organ dysfunction .    No seizure per RN, unknown last BM time, no diarrhea, no fevers+uo    Assessment and Plan    The SIRS and exam findings are likely due to dehydration, constipation, there is no sign of sepsis at this time.  No offending meds, no resp distress, already on appropriate atbx w/ ceftriaxone  Disposition: The patient will remain on the current unit. We will continue to monitor this patient closely.    Joy Miramontes, APRN CNP    Total time is 20 minutes from [phone], [phone], [phone]

## 2018-07-06 NOTE — PLAN OF CARE
Problem: Patient Care Overview  Goal: Plan of Care/Patient Progress Review  Outcome: Improving  HECTOR orientation. Patient uses facial expressions and thumbs/thumbs down to communicate. Full code. tx from ICU around 7pm. LA rechecked at 8pm: 2.8, 1L bolus given-to finish around midnight. Recheck LA at this time. Incontinent at times, but can use urinal. NPO. Enteral feed in J tube at 60ml/hr and free water flush at 60ml q4hr. IJ in place, PCU collect. Tele is sinus tach. Some edema noted in BLE. VSS-no fevers noted. seizure precautions in place. Turned and repositioned q2hr. Blanchable redness on buttocks. Continue to monitor.

## 2018-07-06 NOTE — IR NOTE
Interventional Radiology Intra-procedural Nursing Note    Patient Name: Keyon Farias  Medical Record Number: [id number]  Today's Date: July 6, 2018    Start Time: 1400  End of procedure time: 1404  Procedure: gj tube exchange  Report given to: NANCI Kolb   Time pt departs:  1415    Other Notes: procedure complete with no complications. Pt tolerated well. Site covered with gauze dressing c/d/i    Melanie Cardoso RN

## 2018-07-06 NOTE — PROGRESS NOTES
[facility]    Sepsis Evaluation Progress Note    Date of Service: 07/05/2018    I was called to see Keyon GRAHAM Jarrett due to follow up of lactate. He is known to have an infection.     Physical Exam    Vital Signs:  Temp: 97.3  F (36.3  C) Temp src: Axillary BP: 121/71 Pulse: 101 Heart Rate: 97 Resp: 20 SpO2: 97 % O2 Device: None (Room air)      Lab:  Lactic Acid   Date Value Ref Range Status   07/05/2018 2.8 (H) 0.7 - 2.0 mmol/L Final     Lactate for Sepsis Protocol   Date Value Ref Range Status   07/05/2018 3.0 (HH) 0.4 - 1.9 mmol/L Final     Comment:     Critical Value called to and read back by  MARIELA HUDDLESTON IN ED @ 0036          The patient is at baseline mental status.    The rest of their physical exam is significant for unchanged from exam earlier tonight.    Assessment and Plan    The SIRS and exam findings are likely due to   sepsis.     ID: The patient is currently on the following antibiotics:  Anti-infectives (Future)    Start     Dose/Rate Route Frequency Ordered Stop    07/05/18 1030  cefTRIAXone (ROCEPHIN) 2 g vial to attach to  ml bag for ADULTS or NS 50 ml bag for PEDS      2 g  over 30 Minutes Intravenous EVERY 24 HOURS 07/05/18 1017          Current antibiotic coverage is appropriate for source of infection.    Fluid: Fluid bolus ordered -- 1L NS over 4h    Lab: Repeat lactic acid ordered for 4 hours from now.    Disposition: The patient will remain on the current unit. We will continue to monitor this patient closely.    Sun Keita,

## 2018-07-06 NOTE — PROGRESS NOTES
Admission    Patient arrives to room 619-1 via cart from ICU  Care plan note: yes    Inpatient nursing criteria listed below were met:    PCD's Documented: yes  Skin issues/needs documented :yes  Isolation education started/completed yes  Patient allergies verified with patient: NA  Verified completion of [place] Risk Assessment Tool:  yes  Verified completion of Guardianship screening tool: called pt's rep and documents will come tomorrow  Fall Prevention: Care plan updated, Education given and documented yes  Care Plan initiated: yes  Home medications documented in belongings flowsheet: NA  Patient belongings documented in belongings flowsheet: yes  Reminder note (belongings/ medications) placed in discharge instructions:NA  Admission profile/ required documentation complete: yes

## 2018-07-06 NOTE — PLAN OF CARE
Problem: Patient Care Overview  Goal: Plan of Care/Patient Progress Review  Outcome: Improving    Pt sounded like he had a lot of sputum he was having trouble with spitting up or gets left in back of throat; orally suctioned him twice; after that he was doing a good job of coughing and swallowing the sputum.     Pt here with sepsis from pneumonia. LS fine crackles. Sputum light yellow; hangs out in side of the throat; cleaned out with wet swabs; small amount. Tube feeding; j-tube; free flushes q4 hrs @60ml. Bedrest. Total cares. Isolation; contact; VRE and MRSA. Nonverbal, will do thumbs up or down. Blanchable redness on coccyx. Red spots/bumps on LE. PCD's on. Lactic draw @0000 from rt central line; 1.6. Pt pulled out central line; IVF running through peripheral in left arm; switched to lab collect for morning LABS needed. Tele: sinus tachycardia. DC pending clinical improvement. Nursing will continue to monitor.

## 2018-07-07 LAB
ANION GAP SERPL CALCULATED.3IONS-SCNC: 7 MMOL/L (ref 3–14)
BASOPHILS # BLD AUTO: 0 10E9/L (ref 0–0.2)
BASOPHILS NFR BLD AUTO: 0.4 %
BUN SERPL-MCNC: 7 MG/DL (ref 7–30)
CALCIUM SERPL-MCNC: 8.1 MG/DL (ref 8.5–10.1)
CHLORIDE SERPL-SCNC: 109 MMOL/L (ref 94–109)
CO2 SERPL-SCNC: 27 MMOL/L (ref 20–32)
CREAT SERPL-MCNC: 0.79 MG/DL (ref 0.66–1.25)
DIFFERENTIAL METHOD BLD: ABNORMAL
EOSINOPHIL # BLD AUTO: 0.7 10E9/L (ref 0–0.7)
EOSINOPHIL NFR BLD AUTO: 9 %
ERYTHROCYTE [DISTWIDTH] IN BLOOD BY AUTOMATED COUNT: 13.6 % (ref 10–15)
GFR SERPL CREATININE-BSD FRML MDRD: >90 ML/MIN/1.7M2
GLUCOSE SERPL-MCNC: 162 MG/DL (ref 70–99)
HCT VFR BLD AUTO: 34.5 % (ref 40–53)
HGB BLD-MCNC: 11.3 G/DL (ref 13.3–17.7)
IMM GRANULOCYTES # BLD: 0 10E9/L (ref 0–0.4)
IMM GRANULOCYTES NFR BLD: 0.4 %
LYMPHOCYTES # BLD AUTO: 2.2 10E9/L (ref 0.8–5.3)
LYMPHOCYTES NFR BLD AUTO: 28 %
MCH RBC QN AUTO: 30.1 PG (ref 26.5–33)
MCHC RBC AUTO-ENTMCNC: 32.8 G/DL (ref 31.5–36.5)
MCV RBC AUTO: 92 FL (ref 78–100)
MONOCYTES # BLD AUTO: 0.8 10E9/L (ref 0–1.3)
MONOCYTES NFR BLD AUTO: 9.8 %
NEUTROPHILS # BLD AUTO: 4.2 10E9/L (ref 1.6–8.3)
NEUTROPHILS NFR BLD AUTO: 52.4 %
NRBC # BLD AUTO: 0 10*3/UL
NRBC BLD AUTO-RTO: 0 /100
PLATELET # BLD AUTO: 119 10E9/L (ref 150–450)
POTASSIUM SERPL-SCNC: 3.3 MMOL/L (ref 3.4–5.3)
RBC # BLD AUTO: 3.76 10E12/L (ref 4.4–5.9)
SODIUM SERPL-SCNC: 143 MMOL/L (ref 133–144)
WBC # BLD AUTO: 8 10E9/L (ref 4–11)

## 2018-07-07 PROCEDURE — 36415 COLL VENOUS BLD VENIPUNCTURE: CPT | Performed by: INTERNAL MEDICINE

## 2018-07-07 PROCEDURE — 25000132 ZZH RX MED GY IP 250 OP 250 PS 637: Mod: GY | Performed by: INTERNAL MEDICINE

## 2018-07-07 PROCEDURE — 27210429 ZZH NUTRITION PRODUCT INTERMEDIATE LITER

## 2018-07-07 PROCEDURE — 25000128 H RX IP 250 OP 636: Performed by: INTERNAL MEDICINE

## 2018-07-07 PROCEDURE — A9270 NON-COVERED ITEM OR SERVICE: HCPCS | Mod: GY | Performed by: INTERNAL MEDICINE

## 2018-07-07 PROCEDURE — 85025 COMPLETE CBC W/AUTO DIFF WBC: CPT | Performed by: INTERNAL MEDICINE

## 2018-07-07 PROCEDURE — 25000132 ZZH RX MED GY IP 250 OP 250 PS 637: Mod: GY | Performed by: NURSE PRACTITIONER

## 2018-07-07 PROCEDURE — 12000000 ZZH R&B MED SURG/OB

## 2018-07-07 PROCEDURE — A9270 NON-COVERED ITEM OR SERVICE: HCPCS | Mod: GY | Performed by: NURSE PRACTITIONER

## 2018-07-07 PROCEDURE — 80048 BASIC METABOLIC PNL TOTAL CA: CPT | Performed by: INTERNAL MEDICINE

## 2018-07-07 PROCEDURE — 99232 SBSQ HOSP IP/OBS MODERATE 35: CPT | Performed by: INTERNAL MEDICINE

## 2018-07-07 PROCEDURE — 25800025 ZZH RX 258: Performed by: INTERNAL MEDICINE

## 2018-07-07 RX ORDER — CARBAMAZEPINE 100 MG/5ML
150 SUSPENSION ORAL EVERY 6 HOURS
Status: DISCONTINUED | OUTPATIENT
Start: 2018-07-07 | End: 2018-07-08 | Stop reason: HOSPADM

## 2018-07-07 RX ORDER — SODIUM CHLORIDE AND POTASSIUM CHLORIDE 150; 450 MG/100ML; MG/100ML
INJECTION, SOLUTION INTRAVENOUS CONTINUOUS
Status: DISCONTINUED | OUTPATIENT
Start: 2018-07-07 | End: 2018-07-08

## 2018-07-07 RX ADMIN — POTASSIUM CHLORIDE, DEXTROSE MONOHYDRATE AND SODIUM CHLORIDE: 150; 5; 450 INJECTION, SOLUTION INTRAVENOUS at 00:32

## 2018-07-07 RX ADMIN — POLYETHYLENE GLYCOL 3350 17 G: 17 POWDER, FOR SOLUTION ORAL at 08:38

## 2018-07-07 RX ADMIN — SODIUM CHLORIDE AND POTASSIUM CHLORIDE: 4.5; 1.49 INJECTION, SOLUTION INTRAVENOUS at 16:29

## 2018-07-07 RX ADMIN — CARBAMAZEPINE 150 MG: 100 SUSPENSION ORAL at 06:32

## 2018-07-07 RX ADMIN — HYDROCORTISONE 20 MG: 20 TABLET ORAL at 08:38

## 2018-07-07 RX ADMIN — Medication 10 MG: at 21:26

## 2018-07-07 RX ADMIN — POTASSIUM CHLORIDE, DEXTROSE MONOHYDRATE AND SODIUM CHLORIDE: 150; 5; 450 INJECTION, SOLUTION INTRAVENOUS at 11:14

## 2018-07-07 RX ADMIN — POTASSIUM & SODIUM PHOSPHATES POWDER PACK 280-160-250 MG 1 PACKET: 280-160-250 PACK at 21:31

## 2018-07-07 RX ADMIN — ASPIRIN 81 MG 81 MG: 81 TABLET ORAL at 08:38

## 2018-07-07 RX ADMIN — Medication 10 MG: at 08:57

## 2018-07-07 RX ADMIN — Medication 10 MG: at 08:58

## 2018-07-07 RX ADMIN — Medication 1 PACKET: at 21:31

## 2018-07-07 RX ADMIN — CARBAMAZEPINE 150 MG: 100 SUSPENSION ORAL at 17:58

## 2018-07-07 RX ADMIN — POTASSIUM & SODIUM PHOSPHATES POWDER PACK 280-160-250 MG 1 PACKET: 280-160-250 PACK at 16:29

## 2018-07-07 RX ADMIN — BRIVARACETAM 100 MG: 10 SOLUTION ORAL at 20:58

## 2018-07-07 RX ADMIN — BRIVARACETAM 100 MG: 10 SOLUTION ORAL at 08:53

## 2018-07-07 RX ADMIN — CEFTRIAXONE SODIUM 2 G: 2 INJECTION, POWDER, FOR SOLUTION INTRAMUSCULAR; INTRAVENOUS at 09:46

## 2018-07-07 RX ADMIN — Medication 1 PACKET: at 08:38

## 2018-07-07 RX ADMIN — HYDROCORTISONE 10 MG: 10 TABLET ORAL at 20:57

## 2018-07-07 RX ADMIN — Medication 4000 UNITS: at 08:37

## 2018-07-07 RX ADMIN — CARBAMAZEPINE 150 MG: 100 SUSPENSION ORAL at 11:52

## 2018-07-07 RX ADMIN — ENOXAPARIN SODIUM 40 MG: 40 INJECTION SUBCUTANEOUS at 14:00

## 2018-07-07 RX ADMIN — POTASSIUM & SODIUM PHOSPHATES POWDER PACK 280-160-250 MG 1 PACKET: 280-160-250 PACK at 08:38

## 2018-07-07 RX ADMIN — LEVOTHYROXINE SODIUM 150 MCG: 150 TABLET ORAL at 06:40

## 2018-07-07 RX ADMIN — Medication 1 PACKET: at 16:30

## 2018-07-07 NOTE — PLAN OF CARE
Problem: Patient Care Overview  Goal: Plan of Care/Patient Progress Review  Outcome: No Change  Patient aphasic/non verbal at baseline. Cooperative and comfortable throughout the shift. VSS, afebrile, on room air. No signs of pain or distress. Total care, up with lift and assist of two, turn and repo Q2hrs. Voiding adequately, no BM this shift. NPO with tube feed running into the GJ at 60 ml/hr. Site CDI, tube patent. Tele:NSR. Contact precautions maintained. Na still elevated 152. Lactic 2.6, MD assessed and ruled out sepsis at this time, levels likely due to dehydration and constipation. Receiving IV antibiotics, for aspiration pneumonia. Nursing continue to monitor.

## 2018-07-07 NOTE — PROGRESS NOTES
[facility]    Internal Medicine Hospitalist Progress Note  07/07/2018  I evaluated patient on the above date.      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 Was able to wean off Levophed after several hours and transferred to Hospitalist service 7/5.     Recurrent HCAP with septic shock.  Brought to ED for evaluation 7/5 for fever which was first noted at 2000 on 7/4 PM with Tmax 103. Has known history of recurrent pneumonias prompting frequent hospitalizations. Febrile in the ED with T 103, tachycardic and hypotensive; WBC 12.2, Lactate 2.6 on presentation but peaked at 4.6; procal 0.16 (low risk systemic infection); CXR showed a mild R basilar infiltrate vs atelectasis. Started on Zosyn 7/5. Given 5L IVFs and ultimately started on pressors (Levophed); also given stress dose steroids.   - switched to Rocephin.   - Afebrile since early am 7/5; WBCs normalized from 12.2--13--8 today 7/7  Micro: BC's 7/5 NGTD.  7/5 NG.  - Continue ceftriaxone (started 7/5).  - Continue PRN nebs.  - Monitor cultures.    Hypernatremia, suspect related to hypertonic fluids and decreased water intake- resoved.  Sodium increased to 152 on 7/6, was normal on 7/5. GJ tube clogged as below 7/6.     Recent Labs  Lab 07/07/18  0835 07/06/18  1740 07/06/18  1035 07/05/18  0021    152* 152* 136   - started on D5 1/2 NS with 20 meq KCl on 7/6  - GJ tube to be exchanged as below.  - Na today 143  - change iv fluids to 1/2NS with KCl at 75cc/h  - Monitor BMP.      TBI with aphasia.  Residual right sided spastic hemiplegia  Dysphagia with GJ-tube for TFs.  Patient's mother is his caregiver, along with home care attendants. Has frequent hospitalization for recurrent pneumonias. GJ clogged 7/6 despite declogging measures.   - IR GJ tube replaced on 7/6  - Resumed TF's     Seizure disorder.  Secondary to TBI. Chronic and stable on PTA AEDs.  -  Continue brivaracetam and carbamazepine.      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 Placed on stress dosed steroids in ICU. As above.  - Resumed PTA hydrocortisone (20mg qAM and 10mg qPM) on 7/6.  - Continue levothyroxine.  - Takes testosterone q2 wks.     GERD.  - Continue Protonix.    Prophylaxis.  - PCD's, ambulation.  - Lovenox.    CODE STATUS: FULL.    Dispo.  - anticipate d/c home tomorrow with resuming HHC    # Pain Assessment:  Current Pain Score 7/7/2018   Patient currently in pain? denies   Pain score (0-10) -   Pain location -   Pain descriptors -   rFLACC pain score 0   CPOT pain score -   Keyon liriano pain level was assessed and he currently denies pain.      Cele Maldonado MD      Interval History   Non verbal, looks comfortable, able to respond to simple questions, denies chest pain, no SOB; discussed with mother at bedside, RN and SW    -Data reviewed today: I reviewed all new labs and imaging over the last 24 hours. I personally reviewed no images or EKG's today.    Physical Exam   Heart Rate: 82, Blood pressure 103/54, pulse 101, temperature 98.6  F (37  C), temperature source Axillary, resp. rate 18, height 1.829 m (6'), weight 74.7 kg (164 lb 10.9 oz), SpO2 95 %.  Vitals:    07/05/18 0515 07/06/18 0645 07/07/18 0625   Weight: 75.9 kg (167 lb 5.3 oz) 74.5 kg (164 lb 3.9 oz) 74.7 kg (164 lb 10.9 oz)     Vital Signs with Ranges  Temp:  [97.1  F (36.2  C)-98.8  F (37.1  C)] 98.6  F (37  C)  Heart Rate:  [] 82  Resp:  [18] 18  BP: (103-132)/(54-82) 103/54  SpO2:  [93 %-95 %] 95 %  Patient Vitals for the past 24 hrs:   BP Temp Temp src  Heart Rate Resp SpO2 Weight   07/07/18 0726 103/54 98.6  F (37  C) Axillary 82 18 95 % -   07/07/18 0625 - - - - - - 74.7 kg (164 lb 10.9 oz)   07/07/18 0033 132/70 98.8  F (37.1  C) Oral 95 18 94 % -   07/06/18 1756 129/74 98.5  F (36.9  C) Oral 94 18 93 % -   07/06/18 1622 124/82 97.1  F (36.2  C) Axillary 103 18 93 % -     I/O's Last 24 hours  I/O last 3 completed shifts:  In: 2839.6 [I.V.:859.6; NG/GT:1520]  Out: -     Constitutional: Awake, alert, nonverbal, looks comfortable, smiles..  Respiratory: Diminished in bases. No crackles or wheezes.  Cardiovascular: RRR no m/r/g.  GI: Soft, nt, nd, +BS; GT in place  Skin/Integumen: no rashes, no cyanosis  Extremities- no leg swellings       Data     Recent Labs  Lab 07/07/18  0835 07/06/18 1740 07/06/18  1035 07/05/18  0021   WBC 8.0  --  13.0* 12.2*   HGB 11.3*  --  12.0* 14.2   MCV 92  --  92 89   *  --  130* 147*    152* 152* 136   POTASSIUM 3.3* 3.4 3.8 4.0   CHLORIDE 109 114* 119* 97   CO2 27 30 24 30   BUN 7 7 6* 15   CR 0.79 0.82 0.74 0.87   ANIONGAP 7 8 9 9   STEVE 8.1* 8.7 8.5 8.7   * 139* 111* 101*   ALBUMIN  --   --   --  3.3*   PROTTOTAL  --   --   --  7.8   BILITOTAL  --   --   --  0.5   ALKPHOS  --   --   --  96   ALT  --   --   --  28   AST  --   --   --  17     Recent Labs   Lab Test  07/07/18   0835  07/06/18   1740  07/06/18   1035  07/05/18   0510  07/05/18   0021  05/18/18   0756   05/04/18   0345   05/03/18   0219   02/21/18   0954   02/21/18   0405   GLC  162*  139*  111*   --   101*  100*   < >   --    < >   --    < >   --    < >   --    BGM   --    --    --   96   --    --    --   144*   --   173*   --   122*   --   154*    < > = values in this interval not displayed.         No results found for this or any previous visit (from the past 24 hour(s)).    Medications   All medications were reviewed.    IV infusion builder WITH LARGE additive list         aspirin  81 mg Per J Tube Daily     Brivaracetam  100 mg Per J Tube BID      carBAMazepine  150 mg Oral Q6H     cefTRIAXone  2 g Intravenous Q24H     cholecalciferol  4,000 Units Per Feeding Tube Daily     enoxaparin  40 mg Subcutaneous Q24H     fiber modular  1 packet Per Feeding Tube TID     hydrocortisone  10 mg Per J Tube QPM     hydrocortisone  20 mg Per J Tube QAM     levothyroxine  150 mcg Per J Tube QAM AC     pantoprazole  10 mg Per Feeding Tube Daily     pantoprazole  10 mg Per Feeding Tube QPM     polyethylene glycol  17 g Per GJ tube Daily     potassium & sodium phosphates  1 packet Per Feeding Tube TID     sodium chloride (PF)  3 mL Intravenous Q8H

## 2018-07-07 NOTE — PLAN OF CARE
Problem: Patient Care Overview  Goal: Plan of Care/Patient Progress Review  Outcome: Improving  Pt nonverbal d/t TBI, baseline. Cooperative with cares. Pt appears comfortable, smiles and slept on and off throughout shift. VSS on RA. LS crackles at bases. +BS in all four quadrants, passing flatus, abdomen soft. No BM throughout shift. Incontinent of urine, voiding adequately. NPO, GJ feed running at 60 ml/hr, water flush Q4H 60 ml. Site CDI, tube patent. L PIV infusing, Abx administered. Tele SR. Most recent . Total care, T/R Q2H, blanchable redness on coccyx, no open wounds. Bed bath given. Seizure/Contact precautions maintained. Will continue to monitor.

## 2018-07-07 NOTE — PLAN OF CARE
Problem: Patient Care Overview  Goal: Plan of Care/Patient Progress Review  Outcome: No Change  Nonverbal at baseline. VSS. No indication of pain noted. Tele: SR. NPO. GJ tube replaced today, site WDL. TF running at 60 ml/hr. Incontinent of urine this shift. Turn/repo q2h. Pt refusing oral cares, will continue encouraging. Lactic 2.6, house officer assessed pt and ruled out sepsis, constipation medication started, will continue to monitor. Plan to discharge 1-2 days.

## 2018-07-08 VITALS
BODY MASS INDEX: 22.31 KG/M2 | WEIGHT: 164.68 LBS | TEMPERATURE: 98 F | HEART RATE: 61 BPM | RESPIRATION RATE: 16 BRPM | DIASTOLIC BLOOD PRESSURE: 61 MMHG | HEIGHT: 72 IN | SYSTOLIC BLOOD PRESSURE: 99 MMHG | OXYGEN SATURATION: 93 %

## 2018-07-08 LAB
ANION GAP SERPL CALCULATED.3IONS-SCNC: 6 MMOL/L (ref 3–14)
BUN SERPL-MCNC: 9 MG/DL (ref 7–30)
CALCIUM SERPL-MCNC: 8.1 MG/DL (ref 8.5–10.1)
CHLORIDE SERPL-SCNC: 101 MMOL/L (ref 94–109)
CO2 SERPL-SCNC: 27 MMOL/L (ref 20–32)
CREAT SERPL-MCNC: 0.67 MG/DL (ref 0.66–1.25)
GFR SERPL CREATININE-BSD FRML MDRD: >90 ML/MIN/1.7M2
GLUCOSE SERPL-MCNC: 156 MG/DL (ref 70–99)
POTASSIUM SERPL-SCNC: 4 MMOL/L (ref 3.4–5.3)
SODIUM SERPL-SCNC: 134 MMOL/L (ref 133–144)

## 2018-07-08 PROCEDURE — A9270 NON-COVERED ITEM OR SERVICE: HCPCS | Mod: GY | Performed by: INTERNAL MEDICINE

## 2018-07-08 PROCEDURE — 25000132 ZZH RX MED GY IP 250 OP 250 PS 637: Mod: GY | Performed by: INTERNAL MEDICINE

## 2018-07-08 PROCEDURE — 25000128 H RX IP 250 OP 636: Performed by: INTERNAL MEDICINE

## 2018-07-08 PROCEDURE — 99239 HOSP IP/OBS DSCHRG MGMT >30: CPT | Performed by: INTERNAL MEDICINE

## 2018-07-08 PROCEDURE — 36415 COLL VENOUS BLD VENIPUNCTURE: CPT | Performed by: INTERNAL MEDICINE

## 2018-07-08 PROCEDURE — 80048 BASIC METABOLIC PNL TOTAL CA: CPT | Performed by: INTERNAL MEDICINE

## 2018-07-08 RX ORDER — CEPHALEXIN 500 MG/1
500 CAPSULE ORAL 4 TIMES DAILY
Qty: 24 CAPSULE | Refills: 0 | Status: SHIPPED | OUTPATIENT
Start: 2018-07-08 | End: 2018-07-08

## 2018-07-08 RX ORDER — AMOXICILLIN AND CLAVULANATE POTASSIUM 500; 125 MG/1; MG/1
1 TABLET, FILM COATED ORAL 3 TIMES DAILY
Qty: 18 TABLET | Refills: 0 | Status: SHIPPED | OUTPATIENT
Start: 2018-07-08 | End: 2019-01-31

## 2018-07-08 RX ADMIN — POTASSIUM & SODIUM PHOSPHATES POWDER PACK 280-160-250 MG 1 PACKET: 280-160-250 PACK at 08:39

## 2018-07-08 RX ADMIN — HYDROCORTISONE 20 MG: 20 TABLET ORAL at 08:40

## 2018-07-08 RX ADMIN — PANTOPRAZOLE SODIUM 10 MG: 40 TABLET, DELAYED RELEASE ORAL at 06:54

## 2018-07-08 RX ADMIN — CEFTRIAXONE SODIUM 2 G: 2 INJECTION, POWDER, FOR SOLUTION INTRAMUSCULAR; INTRAVENOUS at 10:07

## 2018-07-08 RX ADMIN — BRIVARACETAM 100 MG: 10 SOLUTION ORAL at 10:01

## 2018-07-08 RX ADMIN — SODIUM CHLORIDE AND POTASSIUM CHLORIDE: 4.5; 1.49 INJECTION, SOLUTION INTRAVENOUS at 04:58

## 2018-07-08 RX ADMIN — CARBAMAZEPINE 150 MG: 100 SUSPENSION ORAL at 08:41

## 2018-07-08 RX ADMIN — LEVOTHYROXINE SODIUM 150 MCG: 150 TABLET ORAL at 06:54

## 2018-07-08 RX ADMIN — CARBAMAZEPINE 150 MG: 100 SUSPENSION ORAL at 01:43

## 2018-07-08 RX ADMIN — Medication 4000 UNITS: at 08:41

## 2018-07-08 RX ADMIN — ASPIRIN 81 MG 81 MG: 81 TABLET ORAL at 08:40

## 2018-07-08 RX ADMIN — Medication 1 PACKET: at 08:39

## 2018-07-08 NOTE — PLAN OF CARE
Problem: Patient Care Overview  Goal: Plan of Care/Patient Progress Review  Patient aphasic/non verbal at baseline. Cooperative for cares. VSS on RA. No signs of pain or distress observed. NPO with tube feed running into the GJ at 60 cc/hr with 60cc water flush Q 4hours. Tele:NSR. Total care, up with lift, turn and reposition Q2hrs. incontinent to bladder, No BM on this shift. Potassium 3.3, MD is aware and pt receiving scheduled potassium phosphate. Recheck tomorrow AM. Possible discharge tomorrow. Will continue to monitor.

## 2018-07-08 NOTE — PLAN OF CARE
Problem: Patient Care Overview  Goal: Plan of Care/Patient Progress Review  Outcome: Adequate for Discharge Date Met: 07/08/18  Pt alert, nonverbal r/t TBI, cooperative with cares. Crackles in lower bases of lungs. VSS on RA. A2 w/lift. T/R Q4H. NPO, tube feeding running at 60 ml/hr, site CDI. IV d/c and pulled. Contact/seizure precautions maintained. Will continue to monitor.     Discharge    Patient discharged to home via [facility] with [place] (Mother)  Care plan note    Listed belongings gathered and returned to patient. Yes  Care Plan and Patient education resolved: Yes  Prescriptions if needed, hard copies sent with patient  Yes, medications given to Mother, [place]   Home and hospital acquired medications returned to patient: No  Medication Bin checked and emptied on discharge Yes  Follow up appointment made for patient: No

## 2018-07-08 NOTE — PROGRESS NOTES
Care Transition Initial Assessment -   Reason For Consult: discharge planning  Met with: mother his guardian and caregiver.  Active Problems:    Sepsis (H)       DATA  Lives With: parent(s)  Mother and PCA thru [facility] provide 24 hour care to patient.  Patient has a home RN thru [facility].    Patient has been hospitalized multiple times and during previous assessments mother has indicated she has all the necessary durable medical equipment to provide for patient's care.    Patient's mother is his legal guardian and papers are scanned into EPIC.  Identified issues/concerns regarding health management: Patient treated for sepsis.  Discharging on oral antibiotics  D/C orders faxed to [facility] and [facility].   Transportation:  Mother requesting [facility] with use of patient's w/c.  Arranged for 1300  ASSESSMENT  Cognitive Status:  Patient non verbal  Concerns to be addressed: none, mother brought patient in timely when she noted he had a fever.   PLAN  Financial costs for the patient includes none.  Patient given options and choices for discharge n/a  Patient/family is agreeable to the plan? yes  Patient Goals and Preferences: home  Patient anticipates discharging to:  Home with resumption of care provided by mom, PCA and [facility] RN

## 2018-07-08 NOTE — PLAN OF CARE
Problem: Patient Care Overview  Goal: Plan of Care/Patient Progress Review  Outcome: Improving  Patient non verbal at baseline. Cooperative and comfortable throughout the shift. Crackles in the lower bases. VSS on room air. Up with lift AX2, turn and repo Q2hrs. 2 soft BM's  this shift. NPO with tube feed running into the GJ at 60 ml/hr Site CDI, tube patent. Tele:NSR.  1/2 NS with KCL at 75ml/hr.No signs of pain or distress. Contact precautions maintained. Na 143. Plan to D/C home today with home health care.

## 2018-07-08 NOTE — DISCHARGE SUMMARY
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  Dysphagia- on GJ tube feedings  Sizure disorder  Panhypopituitarism  GERD      History of Present Illness   Keyon Farias is a 57 year old male with history including TBI and stroke with aphasia and residual R sided spastic hemiplegia and dysphagia with G-tube for TFs/meds; seizure disorder; panhypopituitarism; and frequent hospital admissions for recurrent pneumonia (most recently from 5/1/18 - 5/5/18, then again from 5/15/18 - 5/19/18); who was admitted to the ICU on 7/5/2018 for management of septic shock suspected secondary to recurrent pneumonia requiring vasopressors; for a detailed HPI- please refer to H&P done by Dr Carrillo on 07/05/2018.    Hospital Course   Keyon Farias was admitted on 7/5/2018.  The following problems were addressed during his hospitalization:    Recurrent PNA- Aspiration vs HCAP   Septic shock.  Brought to ED for evaluation 7/5 for fever with Tmax 103. Has known history of recurrent pneumonias prompting frequent hospitalizations. Febrile in the ED with T 103, tachycardic and hypotensive; WBC 12.2, Lactate 2.6 on presentation but peaked at 4.6; procal 0.16; CXR showed a mild R basilar infiltrate vs atelectasis. Started on Zosyn 7/5. Given 5L IVFs and ultimately started on pressors (Levophed); also given stress dose steroids.   - switched to Rocephin on 7/5  - Afebrile since early am 7/5; WBCs normalized from 12.2--13--8 on 7/7  Micro: BC's 7/5 NGTD. UC 7/5 NG.  - plan to d/c home on Augmentin for 6 more days     Hypernatremia, suspect  related to hypertonic fluids and decreased water intake- resoved.  Sodium increased to 152 on 7/6, was normal on 7/5. GJ tube clogged as below 7/6.      Recent Labs  Lab 07/07/18  0835 07/06/18  1740 07/06/18  1035 07/05/18  0021    152* 152* 136   - started on D5 1/2 NS with 20 meq KCl on 7/6  - GJ tube to be exchanged as below.  - Na improved to 143--134      TBI with aphasia. Residual right sided spastic hemiplegia  Dysphagia with GJ-tube for TFs.  Patient's mother is his caregiver, along with home care attendants. Has frequent hospitalization for recurrent pneumonias. GJ clogged 7/6 despite declogging measures.   - IR GJ tube replaced on 7/6  - Resumed TF's  - will resume prior [facility] services      Seizure disorder.  Secondary to TBI. Chronic and stable on PTA AEDs.  -  Continue brivaracetam and carbamazepine.       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 Placed on stress dosed steroids in ICU. As above.  - Resumed PTA hydrocortisone (20mg qAM and 10mg qPM) on 7/6.  - noted with elevated fT4 1.66; will decrease PTA levothyroxine from 150 mcg daily to 125 mcg daily with plan to repeat TFT in 4-6 weeks  - Takes testosterone q2 wks.      GERD.  - Continue Protonix.         # Discharge Pain Plan:  - Patient currently has NO PAIN and is not being prescribed pain medications on discharge.    Cele Maldonado    Significant Results and Procedures   See below    Pending Results   These results will be followed up by PMD  Unresulted Labs Ordered in the Past 30 Days of this Admission     Date and Time Order Name Status Description    7/5/2018 0018 Blood culture Preliminary     7/5/2018 0018 Blood culture Preliminary           Code Status   Full Code       Primary Care Physician   Carlos Gomez    Physical Exam   Temp: 98  F (36.7  C) Temp src: Oral BP: 99/61 Pulse: 61 Heart Rate: 61 Resp: 16 SpO2: 93 % O2 Device: None (Room air)    Vitals:    07/05/18 0515 07/06/18 0645 07/07/18 0625   Weight: 75.9 kg (167 lb 5.3 oz)  74.5 kg (164 lb 3.9 oz) 74.7 kg (164 lb 10.9 oz)     Vital Signs with Ranges  Temp:  [97.8  F (36.6  C)-98  F (36.7  C)] 98  F (36.7  C)  Pulse:  [61] 61  Heart Rate:  [61-72] 61  Resp:  [16-18] 16  BP: ()/(59-61) 99/61  SpO2:  [90 %-93 %] 93 %  I/O last 3 completed shifts:  In: 100 [NG/GT:100]  Out: 175 [Urine:175]     Constitutional: Awake, alert, nonverbal, looks comfortable, smiles..  Respiratory: Diminished in bases. No crackles or wheezes.  Cardiovascular: RRR no m/r/g.  GI: Soft, nt, nd, +BS; GT in place  Skin/Integumen: no rashes, no cyanosis  Extremities- no leg swellings        Discharge Disposition   Discharged to home  Condition at discharge: Stable    Consultations This Hospital Stay   NUTRITION SERVICES ADULT IP CONSULT  PHARMACY IP CONSULT    Time Spent on this Encounter   ICele, personally saw the patient today and spent greater than 30 minutes discharging this patient.    Discharge Orders     Home care nursing referral     Reason for your hospital stay   Pneumonia     Activity   Your activity upon discharge: activity as tolerated     When to contact your care team   Call your primary doctor or return to ER if you have any of the following: temperature greater than 100.5 or less than 96, increased shortness of breath, increased productive cough, confusion, loss of consciousness, abdominal pain, severe diarrhea, leg swellings.     Follow-up and recommended labs and tests    Follow up with primary care provider, Carlos Gomez, within 7 days for hospital follow- up.  Repeat Thyroid function tests in 4-6 weeks.     Full Code     Diet   Follow this diet upon discharge: Orders Placed This Encounter     Adult Formula Drip Feeding: Continuous Isosource 1.5; Jejunostomy; Goal Rate: 60; mL/hr; Medication - Tube Feeding Flush Frequency: At least 15-30 mL water before and after medication administration and with tube clogging; Amount to Send (Nutritio...     NPO for Medical/Clinical  Reasons Except for: Ice Chips       Discharge Medications   Current Discharge Medication List      START taking these medications    Details   amoxicillin-clavulanate (AUGMENTIN) 500-125 MG per tablet 1 tablet by Per G Tube route 3 times daily for 6 days  Qty: 18 tablet, Refills: 0    Comments: 1 tab  Associated Diagnoses: Aspiration pneumonia of right lower lobe, unspecified aspiration pneumonia type (H)         CONTINUE these medications which have CHANGED    Details   levothyroxine (SYNTHROID) 25 mcg/mL SUSP 5 mLs (125 mcg) by Per J Tube route every morning (before breakfast)    Associated Diagnoses: Panhypopituitarism (H)         CONTINUE these medications which have NOT CHANGED    Details   ACETAMINOPHEN  mg by Per Feeding Tube route every 4 hours as needed for pain      albuterol (2.5 MG/3ML) 0.083% neb solution Take 1 vial by nebulization every 4 hours as needed for shortness of breath / dyspnea or wheezing      aspirin 10 mg/mL 8.1 mLs (81 mg) by Per J Tube route daily    Associated Diagnoses: Routine adult health maintenance      bacitracin ointment Apply topically daily as needed for wound care To PEG site.      bisacodyl (DULCOLAX) 10 MG Suppository Place 1 suppository (10 mg) rectally daily as needed for constipation  Qty: 30 suppository    Associated Diagnoses: Constipation, unspecified constipation type      Brivaracetam (BRIVIACT) 10 MG/ML solution Take 100 mg by mouth 2 times daily      calcium carbonate 1250 (500 CA) MG/5ML SUSP suspension 5 mLs (1,250 mg) by Per J Tube route 3 times daily (with meals)  Qty: 450 mL    Associated Diagnoses: Malnutrition (H)      carBAMazepine (TEGRETOL) 100 MG/5ML suspension Take 150 mg by mouth every 6 hours      !! hydrocortisone (CORTEF) 2 mg/mL 10 mLs (20 mg) by Per J Tube route every morning    Associated Diagnoses: Panhypopituitarism (H)      !! hydrocortisone (CORTEF) 2 mg/mL Take 5 mLs (10 mg) by mouth every evening    Associated Diagnoses:  Panhypopituitarism (H)      melatonin (MELATONIN) 1 MG/ML LIQD liquid 6 mg by Jejunal Tube route nightly as needed for sleep      multivitamin  peds with iron (FLINTSTONES COMPLETE) 60 MG chewable tablet Take 1 chew tab by mouth daily      !! pantoprazole (PROTONIX) SUSP suspension Take 10 mg by mouth daily      !! pantoprazole (PROTONIX) SUSP suspension Take 10 mg by mouth every evening      potassium & sodium phosphates (NEUTRA-PHOS) 280-160-250 MG Packet Take 1 packet by mouth 3 times daily 0900, 1500, 2100.       testosterone cypionate (DEPOTESTOTERONE CYPIONATE) 200 MG/ML injection Inject 100 mg into the muscle See Admin Instructions Every 2 weeks.      VITAMIN D, CHOLECALCIFEROL, PO Take 4,000 Units by mouth       Wheat Dextrin (BENEFIBER PO) Take by mouth daily       !! - Potential duplicate medications found. Please discuss with provider.        Allergies   Allergies   Allergen Reactions     Dilantin [Phenytoin Sodium]      Valproic Acid      Toxicity c bone marrow suspension, elevated ammonia levels      Data   Most Recent 3 CBC's:  Recent Labs   Lab Test  07/07/18   0835  07/06/18   1035  07/05/18   0021   WBC  8.0  13.0*  12.2*   HGB  11.3*  12.0*  14.2   MCV  92  92  89   PLT  119*  130*  147*      Most Recent 3 BMP's:  Recent Labs   Lab Test  07/08/18   1045  07/07/18   0835  07/06/18   1740   NA  134  143  152*   POTASSIUM  4.0  3.3*  3.4   CHLORIDE  101  109  114*   CO2  27  27  30   BUN  9  7  7   CR  0.67  0.79  0.82   ANIONGAP  6  7  8   STEVE  8.1*  8.1*  8.7   GLC  156*  162*  139*     Most Recent 2 LFT's:  Recent Labs   Lab Test  07/05/18   0021  05/15/18   2237   AST  17  22   ALT  28  26   ALKPHOS  96  107   BILITOTAL  0.5  0.2     Most Recent INR's and Anticoagulation Dosing History:  Anticoagulation Dose History     Recent Dosing and Labs Latest Ref Rng & Units 12/1/2016 1/22/2017 1/22/2017 1/23/2017 1/25/2017 1/30/2017 2/7/2017    INR 0.86 - 1.14 1.30(H) 2.48(H) 2.58(H) 1.53(H) 1.49(H)  1.32(H) 1.27(H)        Most Recent 3 Troponin's:  Recent Labs   Lab Test  01/22/17   0500  01/21/17   2348  01/21/17   1600   TROPI  0.017  0.025  0.028     Most Recent Cholesterol Panel:  Recent Labs   Lab Test  11/29/16   0430   TRIG  100     Most Recent 6 Bacteria Isolates From Any Culture (See EPIC Reports for Culture Details):  Recent Labs   Lab Test  07/05/18   0045  07/05/18   0034  07/05/18   0021  05/15/18   2323  05/15/18   2315  05/15/18   2237   CULT  No growth after 3 days  No growth  No growth after 3 days  No growth  No growth  No growth     Most Recent TSH, T4 and A1c Labs:  Recent Labs   Lab Test  07/05/18   0021  02/15/18   0610   TSH  <0.01*   --    T4  1.66*   --    A1C   --   6.6*     Results for orders placed or performed during the hospital encounter of 07/05/18   Chest XR,  PA & LAT    Narrative    XR CHEST 2 VW  7/5/2018 1:11 AM     INDICATION: Fever, history of aspiration pneumonia.    COMPARISON: 5/15/2018.      Impression    IMPRESSION: Shallow inspiration. Mild right base atelectasis or  infiltrate. Normal heart size.    PANFILO KING MD   Chest  XR, 1 view PORTABLE    Narrative    XR CHEST PORT 1 VW  7/5/2018 3:40 AM     INDICATION: Central line placement.    COMPARISON: 7/5/2018 at 0106 hours.      Impression    IMPRESSION: New right IJ line with tip in SVC. Otherwise no change.  Mild infiltrate or atelectasis at the right base.    PANFILO KING MD   IR Gastro Jejunostomy Tube Change    Narrative    IR GASTRO JEJUNOSTOMY TUBE CHANGE  7/6/2018 2:14 PM     HISTORY:  Broken GJ tube.    COMPARISON: None.    DESCRIPTION OF PROCEDURE: After obtaining informed consent, the  patient was placed in a supine position on the     After obtaining informed consent, the patient was placed in a supine  position on the fluoroscopy table. The external portions and skin  entry site of existing GJ tube were prepped and draped in the usual  sterile manner. A stiff Glidewire was passed through the  tube and the  tube was removed. Over the wire, a new 16 [place] TORY GJ tube was  placed. Contrast was injected through the tube and showed it to be in  satisfactory position. The balloon of the tube was inflated.    The patient tolerated the procedure well. There were no immediate  postprocedure complications. The patient's vital signs were monitored  by radiology nursing staff under my supervision and remained stable  throughout the study.    MEDICATIONS: None    FLUOROSCOPY TIME: 0.7 minutes    RADIATION DOSE: 10.78 mg    CONTRAST: Please see radiology technician report.      Impression    IMPRESSION: GJ tube replaced as above.    KATIUSKA MAI MD

## 2018-08-09 ENCOUNTER — APPOINTMENT (OUTPATIENT)
Dept: GENERAL RADIOLOGY | Facility: CLINIC | Age: 58
End: 2018-08-09
Attending: EMERGENCY MEDICINE
Payer: MEDICARE

## 2018-08-09 ENCOUNTER — HOSPITAL ENCOUNTER (EMERGENCY)
Facility: CLINIC | Age: 58
Discharge: HOME | End: 2018-08-09
Attending: EMERGENCY MEDICINE | Admitting: EMERGENCY MEDICINE
Payer: MEDICARE

## 2018-08-09 VITALS
TEMPERATURE: 97.5 F | DIASTOLIC BLOOD PRESSURE: 60 MMHG | HEART RATE: 99 BPM | HEIGHT: 72 IN | SYSTOLIC BLOOD PRESSURE: 106 MMHG | OXYGEN SATURATION: 98 % | RESPIRATION RATE: 19 BRPM

## 2018-08-09 DIAGNOSIS — J20.9 ACUTE BRONCHITIS, UNSPECIFIED ORGANISM: ICD-10-CM

## 2018-08-09 LAB
ALBUMIN SERPL-MCNC: 3.3 G/DL (ref 3.4–5)
ALBUMIN UR-MCNC: 10 MG/DL
ALP SERPL-CCNC: 100 U/L (ref 40–150)
ALT SERPL W P-5'-P-CCNC: 27 U/L (ref 0–70)
AMORPH CRY #/AREA URNS HPF: ABNORMAL /HPF
ANION GAP SERPL CALCULATED.3IONS-SCNC: 8 MMOL/L (ref 3–14)
APPEARANCE UR: ABNORMAL
AST SERPL W P-5'-P-CCNC: 21 U/L (ref 0–45)
BASOPHILS # BLD AUTO: 0.1 10E9/L (ref 0–0.2)
BASOPHILS NFR BLD AUTO: 0.4 %
BILIRUB SERPL-MCNC: 0.4 MG/DL (ref 0.2–1.3)
BILIRUB UR QL STRIP: NEGATIVE
BUN SERPL-MCNC: 12 MG/DL (ref 7–30)
CALCIUM SERPL-MCNC: 8.6 MG/DL (ref 8.5–10.1)
CHLORIDE SERPL-SCNC: 94 MMOL/L (ref 94–109)
CO2 SERPL-SCNC: 28 MMOL/L (ref 20–32)
COLOR UR AUTO: YELLOW
CREAT SERPL-MCNC: 0.74 MG/DL (ref 0.66–1.25)
DIFFERENTIAL METHOD BLD: ABNORMAL
EOSINOPHIL # BLD AUTO: 0.3 10E9/L (ref 0–0.7)
EOSINOPHIL NFR BLD AUTO: 2.2 %
ERYTHROCYTE [DISTWIDTH] IN BLOOD BY AUTOMATED COUNT: 12.9 % (ref 10–15)
GFR SERPL CREATININE-BSD FRML MDRD: >90 ML/MIN/1.7M2
GLUCOSE SERPL-MCNC: 89 MG/DL (ref 70–99)
GLUCOSE UR STRIP-MCNC: NEGATIVE MG/DL
HCT VFR BLD AUTO: 37.7 % (ref 40–53)
HGB BLD-MCNC: 13 G/DL (ref 13.3–17.7)
HGB UR QL STRIP: NEGATIVE
IMM GRANULOCYTES # BLD: 0.1 10E9/L (ref 0–0.4)
IMM GRANULOCYTES NFR BLD: 0.3 %
KETONES UR STRIP-MCNC: NEGATIVE MG/DL
LACTATE BLD-SCNC: 1.8 MMOL/L (ref 0.7–2)
LEUKOCYTE ESTERASE UR QL STRIP: NEGATIVE
LYMPHOCYTES # BLD AUTO: 2.5 10E9/L (ref 0.8–5.3)
LYMPHOCYTES NFR BLD AUTO: 15.8 %
MCH RBC QN AUTO: 30.2 PG (ref 26.5–33)
MCHC RBC AUTO-ENTMCNC: 34.5 G/DL (ref 31.5–36.5)
MCV RBC AUTO: 88 FL (ref 78–100)
MONOCYTES # BLD AUTO: 1.4 10E9/L (ref 0–1.3)
MONOCYTES NFR BLD AUTO: 9.2 %
MUCOUS THREADS #/AREA URNS LPF: PRESENT /LPF
NEUTROPHILS # BLD AUTO: 11.3 10E9/L (ref 1.6–8.3)
NEUTROPHILS NFR BLD AUTO: 72.1 %
NITRATE UR QL: NEGATIVE
NRBC # BLD AUTO: 0 10*3/UL
NRBC BLD AUTO-RTO: 0 /100
PH UR STRIP: 8 PH (ref 5–7)
PLATELET # BLD AUTO: 150 10E9/L (ref 150–450)
POTASSIUM SERPL-SCNC: 4.3 MMOL/L (ref 3.4–5.3)
PROCALCITONIN SERPL-MCNC: 0.05 NG/ML
PROT SERPL-MCNC: 7.8 G/DL (ref 6.8–8.8)
RBC # BLD AUTO: 4.31 10E12/L (ref 4.4–5.9)
RBC #/AREA URNS AUTO: 0 /HPF (ref 0–2)
SODIUM SERPL-SCNC: 130 MMOL/L (ref 133–144)
SOURCE: ABNORMAL
SP GR UR STRIP: 1.01 (ref 1–1.03)
UROBILINOGEN UR STRIP-MCNC: 2 MG/DL (ref 0–2)
WBC # BLD AUTO: 15.6 10E9/L (ref 4–11)
WBC #/AREA URNS AUTO: 0 /HPF (ref 0–5)

## 2018-08-09 PROCEDURE — 71046 X-RAY EXAM CHEST 2 VIEWS: CPT

## 2018-08-09 PROCEDURE — 36415 COLL VENOUS BLD VENIPUNCTURE: CPT

## 2018-08-09 PROCEDURE — 80053 COMPREHEN METABOLIC PANEL: CPT | Performed by: EMERGENCY MEDICINE

## 2018-08-09 PROCEDURE — 84145 PROCALCITONIN (PCT): CPT | Performed by: EMERGENCY MEDICINE

## 2018-08-09 PROCEDURE — 36415 COLL VENOUS BLD VENIPUNCTURE: CPT | Performed by: EMERGENCY MEDICINE

## 2018-08-09 PROCEDURE — 83605 ASSAY OF LACTIC ACID: CPT | Performed by: EMERGENCY MEDICINE

## 2018-08-09 PROCEDURE — 81001 URINALYSIS AUTO W/SCOPE: CPT | Performed by: EMERGENCY MEDICINE

## 2018-08-09 PROCEDURE — 85025 COMPLETE CBC W/AUTO DIFF WBC: CPT | Performed by: EMERGENCY MEDICINE

## 2018-08-09 PROCEDURE — 99284 EMERGENCY DEPT VISIT MOD MDM: CPT | Mod: 25

## 2018-08-09 PROCEDURE — 87040 BLOOD CULTURE FOR BACTERIA: CPT | Mod: 91 | Performed by: EMERGENCY MEDICINE

## 2018-08-09 RX ORDER — GLYCOPYRROLATE 1 MG/5ML
1 SOLUTION ORAL 3 TIMES DAILY PRN
Qty: 473 ML | Refills: 0 | Status: ON HOLD | OUTPATIENT
Start: 2018-08-09 | End: 2018-09-02

## 2018-08-09 RX ORDER — AMOXICILLIN AND CLAVULANATE POTASSIUM 600; 42.9 MG/5ML; MG/5ML
875 POWDER, FOR SUSPENSION ORAL 2 TIMES DAILY
Qty: 146 ML | Refills: 0 | Status: SHIPPED | OUTPATIENT
Start: 2018-08-09 | End: 2019-01-31

## 2018-08-09 ASSESSMENT — ENCOUNTER SYMPTOMS
DIARRHEA: 0
WEAKNESS: 1
CONSTIPATION: 0
VOMITING: 0
FEVER: 1

## 2018-08-09 NOTE — ED AVS SNAPSHOT
Emergency Department    [address] [place] [address]    Phone:  [phone]    Fax:  [phone]                                       Keyon Farias   MRN: [id number]    Department:   Emergency Department   Date of Visit:  8/9/2018           After Visit Summary Signature Page     I have received my discharge instructions, and my questions have been answered. I have discussed any challenges I see with this plan with the nurse or doctor.    ..........................................................................................................................................  Patient/Patient Representative Signature      ..........................................................................................................................................  Patient Representative Print Name and Relationship to Patient    ..................................................               ................................................  Date                                            Time    ..........................................................................................................................................  Reviewed by Signature/Title    ...................................................              ..............................................  Date                                                            Time

## 2018-08-09 NOTE — ED AVS SNAPSHOT
Emergency Department    [address] [facility] [address]    Phone:  [phone]    Fax:  [phone]                                       Keyon Farias   MRN: [id number]    Department:   Emergency Department   Date of Visit:  8/9/2018           Patient Information     Date Of Birth          1960        Your diagnoses for this visit were:     Acute bronchitis, unspecified organism        You were seen by Randy Avila MD.      Follow-up Information     Follow up with Evangelist Stock MD.    Specialty:  Internal Medicine    Contact information:    [address] [facility] [address]  [phone]          Discharge Instructions         Bronchitis, Antibiotic Treatment (Adult)    Bronchitis is an infection of the air passages (bronchial tubes) in your lungs. It often occurs when you have a cold. This illness is contagious during the first few days and is spread through the air by coughing and sneezing, or by direct contact (touching the sick person and then touching your own eyes, nose, or mouth).  Symptoms of bronchitis include cough with mucus (phlegm) and low-grade fever. Bronchitis usually lasts 7 to 14 days. Mild cases can be treated with simple home remedies. More severe infection is treated with an antibiotic.  Home care  Follow these guidelines when caring for yourself at home:    If your symptoms are severe, rest at home for the first 2 to 3 days. When you go back to your usual activities, don't let yourself get too tired.    Do not smoke. Also avoid being exposed to secondhand smoke.    You may use over-the-counter medicines to control fever or pain, unless another medicine was prescribed. If you have chronic liver or kidney disease or have ever had a stomach ulcer or gastrointestinal bleeding, talk with your healthcare provider before using these medicines. Also talk to your provider if you are taking medicine to prevent blood clots. Aspirin should never be given to anyone younger  than 18 years of age who is ill with a viral infection or fever. It may cause severe liver or brain damage.    Your appetite may be poor, so a light diet is fine. Avoid dehydration by drinking 6 to 8 glasses of fluids per day (such as water, soft drinks, sports drinks, juices, tea, or soup). Extra fluids will help loosen secretions in the nose and lungs.    Over-the-counter cough, cold, and sore-throat medicines will not shorten the length of the illness, but they may be helpful to reduce symptoms. (Note: Do not use decongestants if you have high blood pressure.)    Finish all antibiotic medicine. Do this even if you are feeling better after only a few days.  Follow-up care  Follow up with your healthcare provider, or as advised. If you had an X-ray or ECG (electrocardiogram), a specialist will review it. You will be notified of any new findings that may affect your care.  If you are age 65 or older, or if you have a chronic lung disease or condition that affects your immune system, or you smoke, ask your healthcare provider about getting a pneumococcal vaccine and a yearly flu shot (influenza vaccine).  When to seek medical advice  Call your healthcare provider right away if any of these occur:    Fever of 100.4 F (38 C) or higher, or as directed by your healthcare provider    Coughing up increased amounts of colored sputum    Weakness, drowsiness, headache, facial pain, ear pain, or a stiff neck  Call 911  Call 911 if any of these occur.    Coughing up blood    Worsening weakness, drowsiness, headache, or stiff neck    Trouble breathing, wheezing, or pain with breathing  Date Last Reviewed: 9/13/2015 2000-2017 [employer]. [address] [place] [address]. All rights reserved. This information is not intended as a substitute for professional medical care. Always follow your healthcare professional's instructions.          24 Hour Appointment Hotline       To make an appointment at any  [facility], call [phone] ([phone]). If you don't have a family doctor or clinic, we will help you find one. [facility] are conveniently located to serve the needs of you and your family.             Review of your medicines      START taking        Dose / Directions Last dose taken    amoxicillin-clavulanate 600-42.9 MG/5ML suspension   Commonly known as:  AUGMENTIN-ES   Dose:  875 mg   Quantity:  146 mL        Take 7.3 mLs (875 mg) by mouth 2 times daily for 10 days   Refills:  0        glycopyrrolate 1 MG/5ML solution   Commonly known as:  CUVPOSA   Dose:  1 mg   Quantity:  473 mL        Take 5 mLs (1 mg) by mouth 3 times daily as needed   Refills:  0          Our records show that you are taking the medicines listed below. If these are incorrect, please call your family doctor or clinic.        Dose / Directions Last dose taken    ACETAMINOPHEN PO   Dose:  650 mg        650 mg by Per Feeding Tube route every 4 hours as needed for pain   Refills:  0        albuterol (2.5 MG/3ML) 0.083% neb solution   Dose:  1 vial        Take 1 vial by nebulization every 4 hours as needed for shortness of breath / dyspnea or wheezing   Refills:  0        aspirin 10 mg/mL Susp   Dose:  81 mg        8.1 mLs (81 mg) by Per J Tube route daily   Refills:  0        bacitracin ointment        Apply topically daily as needed for wound care To PEG site.   Refills:  0        BENEFIBER PO        Take by mouth daily   Refills:  0        bisacodyl 10 MG Suppository   Commonly known as:  DULCOLAX   Dose:  10 mg   Quantity:  30 suppository        Place 1 suppository (10 mg) rectally daily as needed for constipation   Refills:  0        BRIVIACT 10 MG/ML solution   Dose:  100 mg   Generic drug:  Brivaracetam        Take 100 mg by mouth 2 times daily   Refills:  0        calcium carbonate 1250 (500 Ca) MG/5ML Susp suspension   Dose:  1250 mg   Quantity:  450 mL        5 mLs (1,250 mg) by Per J Tube route 3 times daily  (with meals)   Refills:  0        carBAMazepine 100 MG/5ML suspension   Commonly known as:  TEGretol   Dose:  150 mg        Take 150 mg by mouth every 6 hours   Refills:  0        * hydrocortisone 2 mg/mL Susp   Commonly known as:  CORTEF   Dose:  20 mg        10 mLs (20 mg) by Per J Tube route every morning   Refills:  0        * hydrocortisone 2 mg/mL Susp   Commonly known as:  CORTEF   Dose:  10 mg        Take 5 mLs (10 mg) by mouth every evening   Refills:  0        levothyroxine 25 mcg/mL Susp   Commonly known as:  SYNTHROID   Dose:  125 mcg        5 mLs (125 mcg) by Per J Tube route every morning (before breakfast)   Refills:  0        melatonin 1 MG/ML Liqd liquid   Dose:  6 mg        6 mg by Jejunal Tube route nightly as needed for sleep   Refills:  0        multivitamin  peds with iron 60 MG chewable tablet   Dose:  1 chew tab        Take 1 chew tab by mouth daily   Refills:  0        * pantoprazole 2 mg/mL Susp suspension   Commonly known as:  PROTONIX   Dose:  10 mg        Take 10 mg by mouth daily   Refills:  0        * pantoprazole 2 mg/mL Susp suspension   Commonly known as:  PROTONIX   Dose:  10 mg        Take 10 mg by mouth every evening   Refills:  0        potassium & sodium phosphates 280-160-250 MG Packet   Commonly known as:  NEUTRA-PHOS   Dose:  1 packet        Take 1 packet by mouth 3 times daily 0900, 1500, 2100.   Refills:  0        testosterone cypionate 200 MG/ML injection   Commonly known as:  DEPOTESTOTERONE   Dose:  100 mg        Inject 100 mg into the muscle See Admin Instructions Every 2 weeks.   Refills:  0        VITAMIN D (CHOLECALCIFEROL) PO   Dose:  4000 Units        Take 4,000 Units by mouth   Refills:  0        * Notice:  This list has 4 medication(s) that are the same as other medications prescribed for you. Read the directions carefully, and ask your doctor or other care provider to review them with you.            Prescriptions were sent or printed at these locations (2  Prescriptions)                   Other Prescriptions                Printed at Department/Unit printer (2 of 2)         amoxicillin-clavulanate (AUGMENTIN-ES) 600-42.9 MG/5ML suspension               glycopyrrolate (CUVPOSA) 1 MG/5ML solution                Procedures and tests performed during your visit     Procedure/Test Number of Times Performed    Blood culture 2    CBC with platelets differential 1    Comprehensive metabolic panel 1    Lactic acid whole blood 1    Procalcitonin 1    UA reflex to Microscopic and Culture 1    XR Chest 2 Views 1      Orders Needing Specimen Collection     None      Pending Results     Date and Time Order Name Status Description    8/9/2018 1417 Blood culture In process     8/9/2018 1344 Blood culture In process             Pending Culture Results     Date and Time Order Name Status Description    8/9/2018 1417 Blood culture In process     8/9/2018 1344 Blood culture In process             Pending Results Instructions     If you had any lab results that were not finalized at the time of your Discharge, you can call the ED Lab Result RN at [phone]. You will be contacted by this team for any positive Lab results or changes in treatment. The nurses are available 7 days a week from 10A to 6:30P.  You can leave a message 24 hours per day and they will return your call.        Test Results From Your Hospital Stay        8/9/2018  2:33 PM      Component Results     Component Value Ref Range & Units Status    WBC 15.6 (H) 4.0 - 11.0 10e9/L Final    RBC Count 4.31 (L) 4.4 - 5.9 10e12/L Final    Hemoglobin 13.0 (L) 13.3 - 17.7 g/dL Final    Hematocrit 37.7 (L) 40.0 - 53.0 % Final    MCV 88 78 - 100 fl Final    MCH 30.2 26.5 - 33.0 pg Final    MCHC 34.5 31.5 - 36.5 g/dL Final    RDW 12.9 10.0 - 15.0 % Final    Platelet Count 150 150 - 450 10e9/L Final    Diff Method Automated Method  Final    % Neutrophils 72.1 % Final    % Lymphocytes 15.8 % Final    % Monocytes 9.2 % Final    %  Eosinophils 2.2 % Final    % Basophils 0.4 % Final    % Immature Granulocytes 0.3 % Final    Nucleated RBCs 0 0 /100 Final    Absolute Neutrophil 11.3 (H) 1.6 - 8.3 10e9/L Final    Absolute Lymphocytes 2.5 0.8 - 5.3 10e9/L Final    Absolute Monocytes 1.4 (H) 0.0 - 1.3 10e9/L Final    Absolute Eosinophils 0.3 0.0 - 0.7 10e9/L Final    Absolute Basophils 0.1 0.0 - 0.2 10e9/L Final    Abs Immature Granulocytes 0.1 0 - 0.4 10e9/L Final    Absolute Nucleated RBC 0.0  Final         8/9/2018  2:49 PM      Component Results     Component Value Ref Range & Units Status    Sodium 130 (L) 133 - 144 mmol/L Final    Potassium 4.3 3.4 - 5.3 mmol/L Final    Chloride 94 94 - 109 mmol/L Final    Carbon Dioxide 28 20 - 32 mmol/L Final    Anion Gap 8 3 - 14 mmol/L Final    Glucose 89 70 - 99 mg/dL Final    Urea Nitrogen 12 7 - 30 mg/dL Final    Creatinine 0.74 0.66 - 1.25 mg/dL Final    GFR Estimate >90 >60 mL/min/1.7m2 Final    Non  GFR Calc    GFR Estimate If Black >90 >60 mL/min/1.7m2 Final    African American GFR Calc    Calcium 8.6 8.5 - 10.1 mg/dL Final    Bilirubin Total 0.4 0.2 - 1.3 mg/dL Final    Albumin 3.3 (L) 3.4 - 5.0 g/dL Final    Protein Total 7.8 6.8 - 8.8 g/dL Final    Alkaline Phosphatase 100 40 - 150 U/L Final    ALT 27 0 - 70 U/L Final    AST 21 0 - 45 U/L Final         8/9/2018  3:16 PM      Component Results     Component Value Ref Range & Units Status    Procalcitonin 0.05 ng/ml Final    0.05-0.24 ng/ml Low risk of systemic bacterial infection. Local bacterial   infection possible.  Recommendation: Assess other clinical features of   infection. Discourage antibiotics unless strong clinical suspicion for serious   infection.           8/9/2018  2:25 PM      Component Results     Component Value Ref Range & Units Status    Lactic Acid 1.8 0.7 - 2.0 mmol/L Final         8/9/2018  4:35 PM      Component Results     Component Value Ref Range & Units Status    Color Urine Yellow  Final    Appearance  Urine Slightly Cloudy  Final    Glucose Urine Negative NEG^Negative mg/dL Final    Bilirubin Urine Negative NEG^Negative Final    Ketones Urine Negative NEG^Negative mg/dL Final    Specific Gravity Urine 1.012 1.003 - 1.035 Final    Blood Urine Negative NEG^Negative Final    pH Urine 8.0 (H) 5.0 - 7.0 pH Final    Protein Albumin Urine 10 (A) NEG^Negative mg/dL Final    Urobilinogen mg/dL 2.0 0.0 - 2.0 mg/dL Final    Nitrite Urine Negative NEG^Negative Final    Leukocyte Esterase Urine Negative NEG^Negative Final    Source Catheterized Urine  Final    RBC Urine 0 0 - 2 /HPF Final    WBC Urine 0 0 - 5 /HPF Final    Mucous Urine Present (A) NEG^Negative /LPF Final    Amorphous Crystals Few (A) NEG^Negative /HPF Final         8/9/2018  2:19 PM         8/9/2018  3:16 PM      Narrative     CHEST TWO VIEWS 8/9/2018 2:23 PM     HISTORY: Shortness of breath     COMPARISON: 7/5/2018    FINDINGS: Hazy nonspecific bibasilar opacities, left greater than  right. No pneumothorax or pleural effusion. Heart size stable.         Impression     IMPRESSION: Bibasilar airspace opacities may be related to atelectasis  from low lung volumes. Early pneumonia not excluded.     ANNETTE CASTRO MD         8/9/2018  3:54 PM                Clinical Quality Measure: Blood Pressure Screening     Your blood pressure was checked while you were in the emergency department today. The last reading we obtained was  BP: 106/60 . Please read the guidelines below about what these numbers mean and what you should do about them.  If your systolic blood pressure (the top number) is less than 120 and your diastolic blood pressure (the bottom number) is less than 80, then your blood pressure is normal. There is nothing more that you need to do about it.  If your systolic blood pressure (the top number) is 120-139 or your diastolic blood pressure (the bottom number) is 80-89, your blood pressure may be higher than it should be. You should have your blood  "pressure rechecked within a year by a primary care provider.  If your systolic blood pressure (the top number) is 140 or greater or your diastolic blood pressure (the bottom number) is 90 or greater, you may have high blood pressure. High blood pressure is treatable, but if left untreated over time it can put you at risk for heart attack, stroke, or kidney failure. You should have your blood pressure rechecked by a primary care provider within the next 4 weeks.  If your provider in the emergency department today gave you specific instructions to follow-up with your doctor or provider even sooner than that, you should follow that instruction and not wait for up to 4 weeks for your follow-up visit.        Thank you for choosing [facility]       Thank you for choosing [facility] for your care. Our goal is always to provide you with excellent care. Hearing back from our patients is one way we can continue to improve our services. Please take a few minutes to complete the written survey that you may receive in the mail after you visit with us. Thank you!        [employer] Information     [employer] lets you send messages to your doctor, view your test results, renew your prescriptions, schedule appointments and more. To sign up, go to [email or web address] . Click on \"Log in\" on the left side of the screen, which will take you to the Welcome page. Then click on \"Sign up Now\" on the right side of the page.     You will be asked to enter the access code listed below, as well as some personal information. Please follow the directions to create your username and password.     Your access code is: [email or web address]  Expires: 10/6/2018  9:33 AM     Your access code will  in 90 days. If you need help or a new code, please call your [facility] clinic or [phone].        Care EveryWhere ID     This is your Care EveryWhere ID. This could be used by other organizations to access your [facility] medical records  [id number]        Equal " Access to Services     [facility] : Angely Rivas, krishna buenrostro, qayaw berry. So [facility] [phone].    ATENCIÓN: Si habla español, tiene a king disposición servicios gratuitos de asistencia lingüística. Llame al [phone].    We comply with applicable federal civil rights laws and [place] laws. We do not discriminate on the basis of race, color, national origin, age, disability, sex, sexual orientation, or gender identity.            After Visit Summary       This is your record. Keep this with you and show to your community pharmacist(s) and doctor(s) at your next visit.

## 2018-08-09 NOTE — ED NOTES
Patient comes in with mom and home health nurse. Patient had fever of 99.9 axillary at home. Patient was given tylenol at 0915. Patient has a history of aspiration pneumonia and sepsis.     Patient was unable to get out of bed due to weakness. Per home health nurse patient had coarse lung sounds.

## 2018-08-09 NOTE — ED NOTES
Pt unable to void in urinal so straight cath done and clear yellow urine obtained and sent to lab for UA. Pt tolerated well.

## 2018-08-09 NOTE — DISCHARGE INSTRUCTIONS
Bronchitis, Antibiotic Treatment (Adult)    Bronchitis is an infection of the air passages (bronchial tubes) in your lungs. It often occurs when you have a cold. This illness is contagious during the first few days and is spread through the air by coughing and sneezing, or by direct contact (touching the sick person and then touching your own eyes, nose, or mouth).  Symptoms of bronchitis include cough with mucus (phlegm) and low-grade fever. Bronchitis usually lasts 7 to 14 days. Mild cases can be treated with simple home remedies. More severe infection is treated with an antibiotic.  Home care  Follow these guidelines when caring for yourself at home:    If your symptoms are severe, rest at home for the first 2 to 3 days. When you go back to your usual activities, don't let yourself get too tired.    Do not smoke. Also avoid being exposed to secondhand smoke.    You may use over-the-counter medicines to control fever or pain, unless another medicine was prescribed. If you have chronic liver or kidney disease or have ever had a stomach ulcer or gastrointestinal bleeding, talk with your healthcare provider before using these medicines. Also talk to your provider if you are taking medicine to prevent blood clots. Aspirin should never be given to anyone younger than 18 years of age who is ill with a viral infection or fever. It may cause severe liver or brain damage.    Your appetite may be poor, so a light diet is fine. Avoid dehydration by drinking 6 to 8 glasses of fluids per day (such as water, soft drinks, sports drinks, juices, tea, or soup). Extra fluids will help loosen secretions in the nose and lungs.    Over-the-counter cough, cold, and sore-throat medicines will not shorten the length of the illness, but they may be helpful to reduce symptoms. (Note: Do not use decongestants if you have high blood pressure.)    Finish all antibiotic medicine. Do this even if you are feeling better after only a few  days.  Follow-up care  Follow up with your healthcare provider, or as advised. If you had an X-ray or ECG (electrocardiogram), a specialist will review it. You will be notified of any new findings that may affect your care.  If you are age 65 or older, or if you have a chronic lung disease or condition that affects your immune system, or you smoke, ask your healthcare provider about getting a pneumococcal vaccine and a yearly flu shot (influenza vaccine).  When to seek medical advice  Call your healthcare provider right away if any of these occur:    Fever of 100.4 F (38 C) or higher, or as directed by your healthcare provider    Coughing up increased amounts of colored sputum    Weakness, drowsiness, headache, facial pain, ear pain, or a stiff neck  Call 911  Call 911 if any of these occur.    Coughing up blood    Worsening weakness, drowsiness, headache, or stiff neck    Trouble breathing, wheezing, or pain with breathing  Date Last Reviewed: 9/13/2015 2000-2017 [employer]. [address] [place] [address], [place] [address]. All rights reserved. This information is not intended as a substitute for professional medical care. Always follow your healthcare professional's instructions.

## 2018-08-09 NOTE — ED PROVIDER NOTES
History     Chief Complaint:  Fever    HPI   Patient is nonverbal; Full history obtained from patient's mother and at-home nurse.    Keyon Farias is a 57 year old male with a history of traumatic brain injury who presents with his mother and at-home nurse with concern for fever. The patient's mother reports that yesterday the patient began feeling fatigued and intermittently producing coarse breath sounds. Today, he was running a low garde fever, and so decided to present to the ED. They state that the patient was unable to get out of bed, however he cannot walk normally. The patient's mother denies vomiting, diarrhea, constipation. She denies all other concerns here in the ER today.    Allergies:  Dilantin  Valproic acid     Medications:    Albuterol  Dulcolax  Briviact  Tegretol  Cortef  Levothyroxine  Melatonin  Protonix  Neutra-phos  Testosterone  Vitamin D    Past Medical History:    VT  Encephalopathy  Cardiac Arrest  Vent. Fibrillation  Pancreatitis  PEG tube malfunction  Encephalopathy  UTI  Cerebral artery occlusion with cerebral infarction  TBI  Tracheotomy  Thyroid disease  Hemiplegia  Septic shock  Seizures  Pneumonia  Panhypopituitarism  GERD  DVT    Past Surgical History:    Tracheotomy  Vascular surgery  Reconstructive facial surgery  Appendectomy  Right hand repair    Family History:    No past pertinent family history.    Social History:  Marital Status:  Single [1]  Former smoker: quit 1989  Negative for alcohol use.    Review of Systems   Constitutional: Positive for fever.   Gastrointestinal: Negative for constipation, diarrhea and vomiting.   Neurological: Positive for weakness.   All other systems reviewed and are negative.      Physical Exam     Patient Vitals for the past 24 hrs:   BP Temp Temp src Pulse Heart Rate Resp SpO2 Height   08/09/18 1730 106/60 - - - 74 19 - -   08/09/18 1715 - - - - 80 16 98 % -   08/09/18 1700 107/67 - - - 75 16 99 % -   08/09/18 1631 - - - - 73 13 94 % -    08/09/18 1630 103/61 - - - 75 16 - -   08/09/18 1619 - - - - 85 15 97 % -   08/09/18 1600 100/67 - - - 78 13 - -   08/09/18 1530 104/64 - - - 78 17 - -   08/09/18 1521 - - - - 85 19 97 % -   08/09/18 1515 - - - - 78 16 99 % -   08/09/18 1500 106/63 - - - 79 18 - -   08/09/18 1445 - - - - 79 17 (!) 86 % -   08/09/18 1430 105/65 - - - - - - -   08/09/18 1400 98/64 - - - 86 20 - -   08/09/18 1330 95/65 - - - 93 16 - -   08/09/18 1309 (!) 83/58 97.5  F (36.4  C) Temporal 99 - 18 99 % 1.829 m (6')         Physical Exam    GENERAL: well developed, pleasant  HEAD: atraumatic  EYES: pupils reactive, extraocular muscles intact, conjunctivae normal  ENT:  mucus membranes moist. Old tracheostomy scar.  NECK:  trachea midline, normal range of motion  RESPIRATORY: no tachypnea, breath sounds clear to auscultation   CVS: normal S1/S2, no murmurs, intact distal pulses  ABDOMEN: soft, nontender, non-distention. G tube.  MUSCULOSKELETAL: no deformities. Decreased muscle mass in upper and lower extremities.   SKIN: warm and dry, no acute rashes or ulceration  NEURO: Eyes and mouth open, follow simple commands. Weakness in upper and lower extremities. Unable to sit up in bed.  PSYCH:  Difficult to assess.    Emergency Department Course     Imaging:  Radiographic findings were communicated with the family who voiced understanding of the findings.    XR Chest 2 views:   Bibasilar airspace opacities may be related to atelectasis  from low lung volumes. Early pneumonia not excluded.  As per radiology.         Laboratory:  CBC: WBC: 15.6 (H), HGB: 13.0 (L), PLT: 150  CMP: NA: 130 (L), Albumin: 3.3 (L), o/w WNL (Creatinine: 0.74)    Lactic acid: 1.8    Procalcitonin: 0.05    UA with Microscopic: pH: 8.0 (H), Albumin: 10 (A), Mucous: present (A), amorphous crystals: few (A), o/w WNL    Blood culture X 2: PENDING    Emergency Department Course:  Nursing notes and vitals reviewed. (0532) I performed an exam of the patient as documented  above.     IV inserted. Medicine administered as documented above. Blood drawn. This was sent to the lab for further testing, results above.    The patient was sent for a chest x-ray while in the emergency department, findings above.     (1638) I rechecked the patient and discussed the results of his workup thus far.     (1731) I rechecked the patient and discussed the results of his workup thus far.     The patient provided a urine sample here in the emergency department. This was sent for laboratory testing, findings above.     Findings and plan explained to the Patient. Patient discharged home with instructions regarding supportive care, medications, and reasons to return. The importance of close follow-up was reviewed. The patient was prescribed glycopyrrolate, and Augmentin.    I personally reviewed the laboratory results with the Patient and answered all related questions prior to discharge.       Impression & Plan      Medical Decision Making:  Jarrett presents with fever and a history of reoccurring  pneumonia. His family notes that the temperature was not as high as it has been in the past, and didn't want to take a chance and so brought him in early. Here, his pulse-ox is normal and lung exam is clear. Laboratory shows an elevated white count although shows a normal lactic acid and low pro-calcitonin. Chest x-ray is indeterminate and difficult given his degree of atelectasis. I discussed outpatient management with them which they were comfortable with. Blood pressures here are baseline for him as he runs in the upper 90s to low 100s. The patient does not look septic or toxic and he will try outpatient management with antibiotics. His family is wondering about adding glycopyrrolate to decrease oral secretions and to see if that will decrease some of his aspiration risk.     Diagnosis:    ICD-10-CM    1. Acute bronchitis, unspecified organism J20.9        Disposition:  discharged to home    Discharge  Medications:  Discharge Medication List as of 8/9/2018  6:04 PM      START taking these medications    Details   amoxicillin-clavulanate (AUGMENTIN-ES) 600-42.9 MG/5ML suspension Take 7.3 mLs (875 mg) by mouth 2 times daily for 10 days, Disp-146 mL, R-0, Local Print      glycopyrrolate (CUVPOSA) 1 MG/5ML solution Take 5 mLs (1 mg) by mouth 3 times daily as needed, Disp-473 mL, R-0, Local Print           Scribe Disclosure:  IPola, am serving as a scribe on 8/9/2018 at 1:25 PM to personally document services performed by Randy Avila MD based on my observations and the provider's statements to me.       Pola Wheeler  8/9/2018    EMERGENCY DEPARTMENT       Randy Avila MD  08/09/18 4475

## 2018-08-17 ENCOUNTER — TELEPHONE (OUTPATIENT)
Dept: INTERNAL MEDICINE | Facility: CLINIC | Age: 58
End: 2018-08-17

## 2018-08-17 NOTE — TELEPHONE ENCOUNTER
" Pt on my schedule  as new pt for 8/24/18. Chart reviewed for \"schedule review. Pt has been getting care through [facility] system previously including Endo for panhypopituitary issues with TBI, etc. Spoke with pt's mother Savannah Farias to discuss AINSLEY since nothing in  ER note from [facility] but reason for visit was \"AINSLEY per rec of Dr Avila\" from [facility] ER. Mother informed me that she was unhappy some with triage system and getting RFs on time from current primary clinic. She wants all Endo care to remain with  Dr Shea ([facility] End[facility]) along with other specialists.  Informed pt's mother that I would be happy to act as pt's primary MD if she wishes but explained that if referrals necessary, I would have be using  MDs within  referral network (including Endo) and  [facility] outside our referral network.   Pt's mother stated that she would then prefer to keep pt's care within the [facility] system and that she was aware of Int Med clinic by Dr Torrez's office  that she would plan to try. Appt with me will be canceled at this time but I told her that if she changes her mind and wishes to have pt receive  his care all through the  system, she may schedule future appt again with me or other   provider  "

## 2018-09-01 ENCOUNTER — HOSPITAL ENCOUNTER (INPATIENT)
Facility: CLINIC | Age: 58
LOS: 2 days | Discharge: HOME HEALTH | DRG: 871 | End: 2018-09-04
Attending: EMERGENCY MEDICINE | Admitting: INTERNAL MEDICINE
Payer: MEDICARE

## 2018-09-01 ENCOUNTER — APPOINTMENT (OUTPATIENT)
Dept: GENERAL RADIOLOGY | Facility: CLINIC | Age: 58
DRG: 871 | End: 2018-09-01
Attending: EMERGENCY MEDICINE
Payer: MEDICARE

## 2018-09-01 DIAGNOSIS — Z87.01 HISTORY OF ASPIRATION PNEUMONIA: ICD-10-CM

## 2018-09-01 DIAGNOSIS — R65.20 SEVERE SEPSIS (H): ICD-10-CM

## 2018-09-01 DIAGNOSIS — E87.1 HYPONATREMIA: ICD-10-CM

## 2018-09-01 DIAGNOSIS — R50.9 FEVER, UNSPECIFIED FEVER CAUSE: ICD-10-CM

## 2018-09-01 DIAGNOSIS — A41.9 SEVERE SEPSIS (H): ICD-10-CM

## 2018-09-01 LAB
ANION GAP SERPL CALCULATED.3IONS-SCNC: 9 MMOL/L (ref 3–14)
BASOPHILS # BLD AUTO: 0 10E9/L (ref 0–0.2)
BASOPHILS NFR BLD AUTO: 0.3 %
BUN SERPL-MCNC: 12 MG/DL (ref 7–30)
CALCIUM SERPL-MCNC: 8.7 MG/DL (ref 8.5–10.1)
CHLORIDE SERPL-SCNC: 92 MMOL/L (ref 94–109)
CO2 SERPL-SCNC: 28 MMOL/L (ref 20–32)
CREAT SERPL-MCNC: 0.77 MG/DL (ref 0.66–1.25)
DIFFERENTIAL METHOD BLD: ABNORMAL
EOSINOPHIL # BLD AUTO: 0.4 10E9/L (ref 0–0.7)
EOSINOPHIL NFR BLD AUTO: 2.8 %
ERYTHROCYTE [DISTWIDTH] IN BLOOD BY AUTOMATED COUNT: 12.8 % (ref 10–15)
GFR SERPL CREATININE-BSD FRML MDRD: >90 ML/MIN/1.7M2
GLUCOSE SERPL-MCNC: 104 MG/DL (ref 70–99)
HCT VFR BLD AUTO: 40.4 % (ref 40–53)
HGB BLD-MCNC: 14.1 G/DL (ref 13.3–17.7)
IMM GRANULOCYTES # BLD: 0 10E9/L (ref 0–0.4)
IMM GRANULOCYTES NFR BLD: 0.3 %
LACTATE BLD-SCNC: 2.6 MMOL/L (ref 0.7–2)
LYMPHOCYTES # BLD AUTO: 1.8 10E9/L (ref 0.8–5.3)
LYMPHOCYTES NFR BLD AUTO: 14 %
MCH RBC QN AUTO: 30.5 PG (ref 26.5–33)
MCHC RBC AUTO-ENTMCNC: 34.9 G/DL (ref 31.5–36.5)
MCV RBC AUTO: 87 FL (ref 78–100)
MONOCYTES # BLD AUTO: 0.7 10E9/L (ref 0–1.3)
MONOCYTES NFR BLD AUTO: 5.7 %
NEUTROPHILS # BLD AUTO: 9.7 10E9/L (ref 1.6–8.3)
NEUTROPHILS NFR BLD AUTO: 76.9 %
NRBC # BLD AUTO: 0 10*3/UL
NRBC BLD AUTO-RTO: 0 /100
PLATELET # BLD AUTO: 152 10E9/L (ref 150–450)
POTASSIUM SERPL-SCNC: 4.5 MMOL/L (ref 3.4–5.3)
RBC # BLD AUTO: 4.63 10E12/L (ref 4.4–5.9)
SODIUM SERPL-SCNC: 129 MMOL/L (ref 133–144)
WBC # BLD AUTO: 12.7 10E9/L (ref 4–11)

## 2018-09-01 PROCEDURE — 25000132 ZZH RX MED GY IP 250 OP 250 PS 637: Mod: GY | Performed by: EMERGENCY MEDICINE

## 2018-09-01 PROCEDURE — A9270 NON-COVERED ITEM OR SERVICE: HCPCS | Mod: GY | Performed by: EMERGENCY MEDICINE

## 2018-09-01 PROCEDURE — 87040 BLOOD CULTURE FOR BACTERIA: CPT | Performed by: EMERGENCY MEDICINE

## 2018-09-01 PROCEDURE — 83605 ASSAY OF LACTIC ACID: CPT | Performed by: EMERGENCY MEDICINE

## 2018-09-01 PROCEDURE — 85025 COMPLETE CBC W/AUTO DIFF WBC: CPT | Performed by: EMERGENCY MEDICINE

## 2018-09-01 PROCEDURE — 71046 X-RAY EXAM CHEST 2 VIEWS: CPT

## 2018-09-01 PROCEDURE — 80048 BASIC METABOLIC PNL TOTAL CA: CPT | Performed by: EMERGENCY MEDICINE

## 2018-09-01 PROCEDURE — 99285 EMERGENCY DEPT VISIT HI MDM: CPT | Mod: 25

## 2018-09-01 RX ORDER — SODIUM CHLORIDE 9 MG/ML
INJECTION, SOLUTION INTRAVENOUS CONTINUOUS
Status: DISCONTINUED | OUTPATIENT
Start: 2018-09-01 | End: 2018-09-02

## 2018-09-01 RX ADMIN — ACETAMINOPHEN 975 MG: 160 SUSPENSION ORAL at 23:25

## 2018-09-01 NOTE — LETTER
Transition Communication Hand-off for Care Transitions to Next Level of Care Provider    Name: Keyon Farias  : 1960  MRN #: [id number]  Primary Care Provider: Carlos Gomez     Primary Clinic: [address] [address]     Reason for Hospitalization:  Hyponatremia [E87.1]  Severe sepsis (H) [A41.9, R65.20]  Fever, unspecified fever cause [R50.9]  History of aspiration pneumonia [Z87.01]  Admit Date/Time: 2018 10:56 PM  Discharge Date: 2018  Payor Source: Payor: MEDICARE / Plan: MEDICARE / Product Type: Medicare /     Readmission Assessment Measure (HANS) Risk Score/category:  HIGH    Reason for Communication Hand-off Referral: Admission diagnoses: PN  Fragility    Discharge Plan: Discharge to home        Concern for non-adherence with plan of care:   Y/N  NO  Discharge Needs Assessment: Resume Home Care Services       Follow-up specialty is recommended:  NO  Follow-up plan:   Appointment with PCP made for  10:00 AM    Any outstanding tests or procedures:  NO      Referrals     Future Labs/Procedures    Home care nursing referral     Comments:    Resume home care    Home care nursing referral     Comments:    Your provider has ordered home care nursing services. If you have not been contacted within 2 days of your discharge please call the inpatient department phone number at [phone]    Home Care Services will be resumed.  [facility] ([facility] [place]  [phone]  (Nurses provided for Tube Feedings)    [facility]  Provides the Feedings-(delivered once a month)      [facility]  Provides PCA Services-( from 12:00 am to 7 am)    [facility]  Provides Speech Therapy and Occupational Therapy            Key Recommendations:  Discharge home with resumption of Home care Services. His mother is his primary caregiver. He was admitted on  with a fever.    Sepsis, mild, suspect aspiration pneumonitis: Patient with recurrent episodes of  sepsis, most often attributed to ongoing aspiration.  Fever to 103  on presentation with associated tachycardia to the 120 range, lactic acid elevated at 2.8.  Patient also with a mild leukocytosis at 12.7.  Chest x-ray without clear infiltrate, though poor lung volumes with atelectasis.  With no pain, no rash, normal urine, and known history of silent aspiration with reported intermittent cough, aspiration pneumonia/pneumonitis seems most likely etiology of presentation.  -Speech pathology consulted for ongoing swallowing exercises.  No plan to advance diet at this time. - Patient will continue with outpatient speech pathology upon discharge.  -Strict n.p.o.  -Blood culture negative to date  -IV clindamycin and levofloxacin for aspiration pneumonia with Pseudomonas coverage given last hospitalization with antibiotics in July 2018.  These medications will be able to be converted to oral/J-tube at discharge.  -Patient clinically back to baseline in less than 24 hours  - he was kept additional day to make sure he is stable  - discussed with mother who is also the care giver.  - will discontinue abx as he is back to baseline and this is likely chemical pneumonitis      Tube feeding: Patient receives Jevity 1.5 at 100 mL/h until he completes 4 cans at home.  Current regimen is being uptitrated to 5 cans.  Takes place during daytime to ensure patient can be seated upright.  Mother is uncertain as to free water dosing, though she believes patient receives 120 mL every hour.  -Nutrition  to verify and order tube feeds as indicated; currently ordered as Isosource 1.5, 4.5 cans per day.  -Patient to be sitting up during tube feed administration.  -Remains strict n.p.o. as above      Thank you for following this patient.    Kell Nolasco RN  Care Coordinator            AVS/Discharge Summary is the source of truth; this is a helpful guide for improved communication of patient story

## 2018-09-01 NOTE — Clinical Note
Admitting Physician: CHARLES MCKEON [[id number]]   Special needs: No Special Needs [20]   Bed request comments: Inpt- Med bed

## 2018-09-01 NOTE — IP AVS SNAPSHOT
[facility] [address] [facility]    [address] [place] [address]    Phone:  [phone]                                       After Visit Summary   9/1/2018    Keyon Farias    MRN: [id number]           After Visit Summary Signature Page     I have received my discharge instructions, and my questions have been answered. I have discussed any challenges I see with this plan with the nurse or doctor.    ..........................................................................................................................................  Patient/Patient Representative Signature      ..........................................................................................................................................  Patient Representative Print Name and Relationship to Patient    ..................................................               ................................................  Date                                            Time    ..........................................................................................................................................  Reviewed by Signature/Title    ...................................................              ..............................................  Date                                                            Time          22EPIC Rev 08/18

## 2018-09-02 LAB
ALBUMIN UR-MCNC: 10 MG/DL
APPEARANCE UR: CLEAR
BILIRUB UR QL STRIP: NEGATIVE
COLOR UR AUTO: YELLOW
GLUCOSE UR STRIP-MCNC: NEGATIVE MG/DL
HGB UR QL STRIP: NEGATIVE
KETONES UR STRIP-MCNC: NEGATIVE MG/DL
LACTATE BLD-SCNC: 1.8 MMOL/L (ref 0.7–2)
LEUKOCYTE ESTERASE UR QL STRIP: NEGATIVE
MUCOUS THREADS #/AREA URNS LPF: PRESENT /LPF
NITRATE UR QL: NEGATIVE
PH UR STRIP: 7.5 PH (ref 5–7)
RBC #/AREA URNS AUTO: <1 /HPF (ref 0–2)
SOURCE: ABNORMAL
SP GR UR STRIP: 1.02 (ref 1–1.03)
UROBILINOGEN UR STRIP-MCNC: 2 MG/DL (ref 0–2)
WBC #/AREA URNS AUTO: <1 /HPF (ref 0–5)

## 2018-09-02 PROCEDURE — 81001 URINALYSIS AUTO W/SCOPE: CPT | Performed by: EMERGENCY MEDICINE

## 2018-09-02 PROCEDURE — 96361 HYDRATE IV INFUSION ADD-ON: CPT

## 2018-09-02 PROCEDURE — 25000128 H RX IP 250 OP 636: Performed by: INTERNAL MEDICINE

## 2018-09-02 PROCEDURE — 96365 THER/PROPH/DIAG IV INF INIT: CPT

## 2018-09-02 PROCEDURE — 25000125 ZZHC RX 250: Performed by: INTERNAL MEDICINE

## 2018-09-02 PROCEDURE — 40000895 ZZH STATISTIC SLP IP EVAL DEFER: Performed by: SPEECH-LANGUAGE PATHOLOGIST

## 2018-09-02 PROCEDURE — 25000128 H RX IP 250 OP 636: Performed by: EMERGENCY MEDICINE

## 2018-09-02 PROCEDURE — 12000000 ZZH R&B MED SURG/OB

## 2018-09-02 PROCEDURE — 83605 ASSAY OF LACTIC ACID: CPT | Performed by: EMERGENCY MEDICINE

## 2018-09-02 PROCEDURE — 25000132 ZZH RX MED GY IP 250 OP 250 PS 637: Mod: GY | Performed by: INTERNAL MEDICINE

## 2018-09-02 PROCEDURE — 87040 BLOOD CULTURE FOR BACTERIA: CPT | Performed by: EMERGENCY MEDICINE

## 2018-09-02 PROCEDURE — 99223 1ST HOSP IP/OBS HIGH 75: CPT | Mod: AI | Performed by: INTERNAL MEDICINE

## 2018-09-02 PROCEDURE — A9270 NON-COVERED ITEM OR SERVICE: HCPCS | Mod: GY | Performed by: INTERNAL MEDICINE

## 2018-09-02 PROCEDURE — 40000893 ZZH STATISTIC PT IP EVAL DEFER: Performed by: PHYSICAL THERAPIST

## 2018-09-02 PROCEDURE — 87086 URINE CULTURE/COLONY COUNT: CPT | Performed by: EMERGENCY MEDICINE

## 2018-09-02 RX ORDER — POTASSIUM CHLORIDE 7.45 MG/ML
10 INJECTION INTRAVENOUS
Status: DISCONTINUED | OUTPATIENT
Start: 2018-09-02 | End: 2018-09-04 | Stop reason: HOSPADM

## 2018-09-02 RX ORDER — CARBAMAZEPINE 100 MG/5ML
150 SUSPENSION ORAL EVERY 6 HOURS
Status: DISCONTINUED | OUTPATIENT
Start: 2018-09-02 | End: 2018-09-04 | Stop reason: HOSPADM

## 2018-09-02 RX ORDER — LEVOTHYROXINE SODIUM 125 UG/1
125 TABLET ORAL
Status: DISCONTINUED | OUTPATIENT
Start: 2018-09-02 | End: 2018-09-04 | Stop reason: HOSPADM

## 2018-09-02 RX ORDER — HYDROCORTISONE 10 MG/1
20 TABLET ORAL EVERY MORNING
Status: DISCONTINUED | OUTPATIENT
Start: 2018-09-02 | End: 2018-09-04 | Stop reason: HOSPADM

## 2018-09-02 RX ORDER — NALOXONE HYDROCHLORIDE 0.4 MG/ML
.1-.4 INJECTION, SOLUTION INTRAMUSCULAR; INTRAVENOUS; SUBCUTANEOUS
Status: DISCONTINUED | OUTPATIENT
Start: 2018-09-02 | End: 2018-09-04 | Stop reason: HOSPADM

## 2018-09-02 RX ORDER — POTASSIUM CHLORIDE 1.5 G/1.58G
20-40 POWDER, FOR SOLUTION ORAL
Status: DISCONTINUED | OUTPATIENT
Start: 2018-09-02 | End: 2018-09-04 | Stop reason: HOSPADM

## 2018-09-02 RX ORDER — LANOLIN ALCOHOL/MO/W.PET/CERES
6 CREAM (GRAM) TOPICAL
Status: DISCONTINUED | OUTPATIENT
Start: 2018-09-02 | End: 2018-09-04 | Stop reason: HOSPADM

## 2018-09-02 RX ORDER — POTASSIUM CHLORIDE 1500 MG/1
20-40 TABLET, EXTENDED RELEASE ORAL
Status: DISCONTINUED | OUTPATIENT
Start: 2018-09-02 | End: 2018-09-04 | Stop reason: HOSPADM

## 2018-09-02 RX ORDER — CLINDAMYCIN PHOSPHATE 900 MG/50ML
900 INJECTION, SOLUTION INTRAVENOUS EVERY 8 HOURS
Status: DISCONTINUED | OUTPATIENT
Start: 2018-09-02 | End: 2018-09-04 | Stop reason: HOSPADM

## 2018-09-02 RX ORDER — LEVOFLOXACIN 5 MG/ML
750 INJECTION, SOLUTION INTRAVENOUS EVERY 24 HOURS
Status: DISCONTINUED | OUTPATIENT
Start: 2018-09-02 | End: 2018-09-04 | Stop reason: HOSPADM

## 2018-09-02 RX ORDER — CARBAMAZEPINE 100 MG/5ML
150 SUSPENSION ORAL ONCE
Status: COMPLETED | OUTPATIENT
Start: 2018-09-02 | End: 2018-09-02

## 2018-09-02 RX ORDER — MULTIVIT WITH MINERALS/LUTEIN
1 TABLET ORAL DAILY
Status: ON HOLD | COMMUNITY
End: 2019-09-14

## 2018-09-02 RX ORDER — ACETAMINOPHEN 325 MG/1
650 TABLET ORAL EVERY 4 HOURS PRN
Status: DISCONTINUED | OUTPATIENT
Start: 2018-09-02 | End: 2018-09-02

## 2018-09-02 RX ORDER — POTASSIUM CHLORIDE 29.8 MG/ML
20 INJECTION INTRAVENOUS
Status: DISCONTINUED | OUTPATIENT
Start: 2018-09-02 | End: 2018-09-04 | Stop reason: HOSPADM

## 2018-09-02 RX ORDER — POTASSIUM CL/LIDO/0.9 % NACL 10MEQ/0.1L
10 INTRAVENOUS SOLUTION, PIGGYBACK (ML) INTRAVENOUS
Status: DISCONTINUED | OUTPATIENT
Start: 2018-09-02 | End: 2018-09-04 | Stop reason: HOSPADM

## 2018-09-02 RX ORDER — CEFTRIAXONE 1 G/1
1 INJECTION, POWDER, FOR SOLUTION INTRAMUSCULAR; INTRAVENOUS ONCE
Status: COMPLETED | OUTPATIENT
Start: 2018-09-02 | End: 2018-09-02

## 2018-09-02 RX ORDER — ASPIRIN 81 MG/1
81 TABLET, CHEWABLE ORAL DAILY
Status: DISCONTINUED | OUTPATIENT
Start: 2018-09-02 | End: 2018-09-04 | Stop reason: HOSPADM

## 2018-09-02 RX ORDER — BISACODYL 10 MG
10 SUPPOSITORY, RECTAL RECTAL DAILY PRN
Status: DISCONTINUED | OUTPATIENT
Start: 2018-09-02 | End: 2018-09-04 | Stop reason: HOSPADM

## 2018-09-02 RX ORDER — HYDROCORTISONE 10 MG/1
10 TABLET ORAL EVERY EVENING
Status: DISCONTINUED | OUTPATIENT
Start: 2018-09-02 | End: 2018-09-04 | Stop reason: HOSPADM

## 2018-09-02 RX ORDER — LIDOCAINE 40 MG/G
CREAM TOPICAL
Status: DISCONTINUED | OUTPATIENT
Start: 2018-09-02 | End: 2018-09-04 | Stop reason: HOSPADM

## 2018-09-02 RX ORDER — ALBUTEROL SULFATE 0.83 MG/ML
2.5 SOLUTION RESPIRATORY (INHALATION) EVERY 4 HOURS PRN
Status: DISCONTINUED | OUTPATIENT
Start: 2018-09-02 | End: 2018-09-04 | Stop reason: HOSPADM

## 2018-09-02 RX ORDER — MAGNESIUM SULFATE HEPTAHYDRATE 40 MG/ML
4 INJECTION, SOLUTION INTRAVENOUS EVERY 4 HOURS PRN
Status: DISCONTINUED | OUTPATIENT
Start: 2018-09-02 | End: 2018-09-04 | Stop reason: HOSPADM

## 2018-09-02 RX ADMIN — CLINDAMYCIN PHOSPHATE 900 MG: 900 INJECTION, SOLUTION INTRAVENOUS at 07:18

## 2018-09-02 RX ADMIN — HYDROCORTISONE 10 MG: 10 TABLET ORAL at 20:04

## 2018-09-02 RX ADMIN — SODIUM CHLORIDE, POTASSIUM CHLORIDE, SODIUM LACTATE AND CALCIUM CHLORIDE 1000 ML: 600; 310; 30; 20 INJECTION, SOLUTION INTRAVENOUS at 06:05

## 2018-09-02 RX ADMIN — CARBAMAZEPINE 150 MG: 100 SUSPENSION ORAL at 12:40

## 2018-09-02 RX ADMIN — POTASSIUM & SODIUM PHOSPHATES POWDER PACK 280-160-250 MG 1 PACKET: 280-160-250 PACK at 21:30

## 2018-09-02 RX ADMIN — CARBAMAZEPINE 150 MG: 100 SUSPENSION ORAL at 00:18

## 2018-09-02 RX ADMIN — SODIUM CHLORIDE: 9 INJECTION, SOLUTION INTRAVENOUS at 01:57

## 2018-09-02 RX ADMIN — POTASSIUM & SODIUM PHOSPHATES POWDER PACK 280-160-250 MG 1 PACKET: 280-160-250 PACK at 17:41

## 2018-09-02 RX ADMIN — POTASSIUM & SODIUM PHOSPHATES POWDER PACK 280-160-250 MG 1 PACKET: 280-160-250 PACK at 08:34

## 2018-09-02 RX ADMIN — CEFTRIAXONE SODIUM 1 G: 1 INJECTION, POWDER, FOR SOLUTION INTRAMUSCULAR; INTRAVENOUS at 01:27

## 2018-09-02 RX ADMIN — SODIUM CHLORIDE: 9 INJECTION, SOLUTION INTRAVENOUS at 01:20

## 2018-09-02 RX ADMIN — ASPIRIN 81 MG 81 MG: 81 TABLET ORAL at 08:34

## 2018-09-02 RX ADMIN — CARBAMAZEPINE 150 MG: 100 SUSPENSION ORAL at 17:41

## 2018-09-02 RX ADMIN — SODIUM CHLORIDE 1000 ML: 9 INJECTION, SOLUTION INTRAVENOUS at 00:40

## 2018-09-02 RX ADMIN — CLINDAMYCIN PHOSPHATE 900 MG: 900 INJECTION, SOLUTION INTRAVENOUS at 13:53

## 2018-09-02 RX ADMIN — HYDROCORTISONE 20 MG: 10 TABLET ORAL at 08:34

## 2018-09-02 RX ADMIN — LEVOTHYROXINE SODIUM 125 MCG: 125 TABLET ORAL at 06:59

## 2018-09-02 RX ADMIN — CLINDAMYCIN PHOSPHATE 900 MG: 900 INJECTION, SOLUTION INTRAVENOUS at 21:30

## 2018-09-02 RX ADMIN — SODIUM CHLORIDE: 9 INJECTION, SOLUTION INTRAVENOUS at 01:14

## 2018-09-02 RX ADMIN — LEVOFLOXACIN 750 MG: 5 INJECTION, SOLUTION INTRAVENOUS at 08:34

## 2018-09-02 RX ADMIN — CARBAMAZEPINE 150 MG: 100 SUSPENSION ORAL at 06:59

## 2018-09-02 RX ADMIN — SIMETHICONE 10 MG: 20 EMULSION ORAL at 17:41

## 2018-09-02 RX ADMIN — SIMETHICONE 10 MG: 20 EMULSION ORAL at 06:59

## 2018-09-02 ASSESSMENT — ACTIVITIES OF DAILY LIVING (ADL)
ADLS_ACUITY_SCORE: 44

## 2018-09-02 NOTE — PROGRESS NOTES
RECEIVING UNIT ED HANDOFF REVIEW    ED Nurse Handoff Report was reviewed by: Megan Bolanos on September 2, 2018 at 3:23 AM

## 2018-09-02 NOTE — ED NOTES
[facility]  ED Nurse Handoff Report    ED Chief complaint: Fever (Started approx 1 hour ago. Mother reports temp of 100.1 axillary. Reports he is acting differently.)      ED Diagnosis:   Final diagnoses:   Fever, unspecified fever cause   History of aspiration pneumonia   Hyponatremia   Severe sepsis (H)       Code Status: Full Code    Allergies:   Allergies   Allergen Reactions     Dilantin [Phenytoin Sodium]      Valproic Acid      Toxicity c bone marrow suspension, elevated ammonia levels        Activity level - Baseline/Home:  Total Care    Activity Level - Current:   Total Care     Needed?: No    Isolation: Yes  Infection: VRE/MRSA  Bariatric?: No    Vital Signs:   Vitals:    09/02/18 0100 09/02/18 0112 09/02/18 0145 09/02/18 0159   BP: 111/64  103/54    Pulse:       Resp: 22 18 11    Temp:    98.7  F (37.1  C)   TempSrc:    Oral   SpO2: 95% 95% 97%        Cardiac Rhythm: ,   Cardiac  Cardiac Rhythm: Normal sinus rhythm    Pain level:      Is this patient confused?: No   [place] - Suicide Severity Rating Scale Completed?  Yes  If yes, what color did the patient score?  White    Patient Report: Initial Complaint: Fever. Patient is a total care patient due to hx of TBI. Patient's mother took temp around 9pm and slowly elevated over the evening. Came to [facility] for eval; mother reports he was acting differently at home. On arrival, patient had fever of 103 rectally. Mother reports no changes in urine or stole patterns. Cough noted on assessment.  Focused Assessment: Patient has intermittent cough, chest Xray done, see results. UA collected by quick cath. Patient denies pain; able to verbalize yes or no. Has G tube for feedings and meds.   Tests Performed: Chest Xray, Ultrasound IV, IV fluids, Tylenol by G tube  Abnormal Results: Lactic, WBCs, see results tab  Treatments provided: IV fluids, G tube meds, IV abx, and labs/blood cultures    Family Comments: Mother at bedside    OBS  brochure/video discussed/provided to patient: No    ED Medications:   Medications   sodium chloride 0.9% infusion ( Intravenous New Bag 9/2/18 0157)   acetaminophen (TYLENOL) solution 975 mg (975 mg Oral Given 9/1/18 2325)   0.9% sodium chloride BOLUS (0 mLs Intravenous Stopped 9/2/18 0132)   carBAMazepine (TEGretol) suspension 150 mg (150 mg Oral Given 9/2/18 0018)   cefTRIAXone (ROCEPHIN) 1 g vial to attach to  mL bag for ADULTS or NS 50 mL bag for PEDS (0 g Intravenous Stopped 9/2/18 0200)       Drips infusing?:  No    For the majority of the shift this patient was Green.   Interventions performed were n/a.    Severe Sepsis OR Septic Shock Diagnosis Present:   Yes    Per the ED Provider, Time Zero for severe sepsis or septic shock is:  n/a    3 Hour Severe Sepsis Bundle Completion:  1. Initial Lactic Acid Result:   Recent Labs   Lab Test  09/02/18   0218  09/01/18   2330  08/09/18   1403   LACT  1.8  2.6*  1.8     2. Blood Cultures before Antibiotics: Yes  3. Broad Spectrum Antibiotics Administered:     Anti-infectives     None        4. 2500 ml of IV fluids have been given so far      6 Hour Severe Sepsis Bundle Completion:    1. Repeat Lactic Acid Level:   Last result   Lab Results   Component Value Date    LACT 1.8 09/02/2018     2. Patient currently on Vasopressors =  No      ED NURSE PHONE NUMBER: [phone]
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
## 2018-09-02 NOTE — ED PROVIDER NOTES
History     Chief Complaint:  Fever     HPI The history is limited due to the patient's non-verbal status and is obtained through the patient's mother.     Keyon Fraias is a non-verbal 58 year old male with a traumatic brain injury who presents accompanied by his mother for evaluation of a fever. Recently, the patient has had a cough. Tonight around 2200, the patient's mother noticed that he had a fever with a temperature of 100.1 at home, prompting her to bring him into the ED for evaluation. He has a history of aspiration pneumonia and urinary tract infections. He has not had any recent rash or skin redness.      Allergies:  Dilantin   Valproic acid     Medications:    ACETAMINOPHEN PO  albuterol (2.5 MG/3ML) 0.083% neb solution  aspirin 10 mg/mL  bacitracin ointment  bisacodyl (DULCOLAX) 10 MG Suppository  Brivaracetam (BRIVIACT) 10 MG/ML solution  calcium carbonate 1250 (500 CA) MG/5ML SUSP suspension  carBAMazepine (TEGRETOL) 100 MG/5ML suspension  glycopyrrolate (CUVPOSA) 1 MG/5ML solution  hydrocortisone (CORTEF) 2 mg/mL  levothyroxine (SYNTHROID) 25 mcg/mL SUSP  melatonin (MELATONIN) 1 MG/ML LIQD liquid  multivitamin  peds with iron (FLINTSTONES COMPLETE) 60 MG chewable tablet  pantoprazole (PROTONIX) SUSP suspension  potassium & sodium phosphates (NEUTRA-PHOS) 280-160-250 MG Packet  testosterone cypionate (DEPOTESTOTERONE CYPIONATE) 200 MG/ML injection  VITAMIN D, CHOLECALCIFEROL, PO  Wheat Dextrin (BENEFIBER PO)    Past Medical History:    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  DVT of upper extremity   GERD   Panhypopituitarism   Pneumonia  Seizures  Septic shock  Spastic hemiplegia affecting dominant side  Thyroid disease  Tracheostomy care  Traumatic brain injury  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 No pertinent family history.     Social History:  Tobacco use:    Former smoker, quit 1989  Alcohol use:    Negative  Marital status:    Single   Accompanied to ED by:  Mother      Review of Systems   Unable to perform ROS: Patient nonverbal     Physical Exam     Physical Exam    Patient Vitals for the past 24 hrs:   BP Temp Temp src Pulse Heart Rate Resp SpO2   09/02/18 0159 - 98.7  F (37.1  C) Oral - - - -   09/02/18 0145 103/54 - - - 109 11 97 %   09/02/18 0112 - - - - 110 18 95 %   09/02/18 0100 111/64 - - - 110 22 95 %   09/01/18 2329 101/70 - - - - - -   09/01/18 2325 - - - - - - 99 %   09/01/18 2303 - 103  F (39.4  C) Oral - - - -   09/01/18 2302 - 103  F (39.4  C) Rectal - - - -   09/01/18 2300 97/74 100.8  F (38.2  C) Temporal 126 - 20 98 %       General: Alert and Interactive. Wearing a diaper. Intermittent cough.   Head: No signs of trauma.   Mouth/Throat: Oropharynx is clear. Mucous membranes are dry.   Eyes: Conjunctivae are normal. Pupils are equal, round, and reactive to light.   Neck: Normal range of motion. No nuchal rigidity.   CV: Normal rate and regular rhythm.    Resp: Effort normal and breath sounds normal. No respiratory distress.   GI: Soft. There is no tenderness or guarding. GJ tube left upper quadrant.   MSK: Normal range of motion. no edema.   Neuro: PERRLA, EOMI, strength in upper/lower extremities normal and symmetrical.   Sensation normal.   GCS eye subscore is 4. GCS verbal subscore is 5. GCS motor subscore is 6.   Skin: Skin is warm and dry. No rash noted.   Psych: Behavior is normal.     Emergency Department Course     Imaging:  Radiographic findings were communicated with the family who voiced understanding of the findings.    XR Chest:  IMPRESSION: Shallow  inspiration with minimal bibasilar opacity, likely due to atelectasis and less prominent than on the previous. No new focal airspace disease or significant change. Stable heart size.  Per radiology.     Laboratory:  CBC: WBC 12.7 high, o/w WNL (HGB 14.1, )  BMP:  low, Chloride 92 low, Glucose 104 high, o/w WNL (Creatinine 0.77)  Lactic acid whole blood (23:30): 2.6 high   Lactic acid (02:18): 1.8   Blood culture: Pending x2   UA with Microscopic: pH 7.5 high, Protein albumin 10, Mucous present, o/w Negative   Urine Culture Aerobic Bacterial: Pending      Interventions:  2325 Tylenol 975 mg PO  0018 Tegretol 150 mg PO   0040 NS 1,000 mL IV   0114  mL/hr IV   0127 Rocephin 1 g IV     Emergency Department Course:  Nursing notes and vitals reviewed.  2348: I performed an exam of the patient as documented above.     0239: I spoke with Dr. Villavicencio of the hospitalist service regarding patient's presentation, findings, and plan of care.     0245: I reassessed the patient and updated the mother.     Findings and plan explained to the mother who consents to admission. Discussed the patient with Dr. Villavicencio, who will admit the patient to a [facility] bed for further monitoring, evaluation, and treatment.     Impression & Plan      CMS Diagnoses:  The patient has signs of Severe Sepsis as evidenced by:    1. 2 SIRS criteria, AND  2. Suspected infection, AND   3. Organ dysfunction: Lactic Acid > 1.9    Time severe sepsis diagnosis confirmed = 2345 as this was the time when Lactate resulted, and the level was > 1.9      3 Hour Severe Sepsis Bundle Completion:  1. Initial Lactic Acid Result:   Recent Labs   Lab Test  09/02/18   0218  09/01/18   2330  08/09/18   1403   LACT  1.8  2.6*  1.8     2. Blood Cultures before Antibiotics: Yes  3. Broad Spectrum Antibiotics Administered: Yes     Anti-infectives (Future)    Start     Dose/Rate Route Frequency Ordered Stop    09/02/18 0054  cefTRIAXone (ROCEPHIN) 1 g vial to attach to   mL bag for ADULTS or NS 50 mL bag for PEDS      1 g  over 15-30 Minutes Intravenous ONCE 09/02/18 0053          4. 2,500 ml of IV fluids.  Ideal body weight: 77.6 kg (171 lb 1.2 oz)    Severe Sepsis reassessment:  1. Repeat Lactic Acid Level: 1.8  2. MAP>65 after initial IVF bolus, will continue to monitor fluid status and vital signs  I attest to having performed a repeat sepsis exam and assessment of perfusion at 0239 and the results demonstrate improved perfusion.    Medical Decision Making:  Keyon Farias is a 58 year old male who is presenting to the ER with a fever. He has a history of aspiration pneumonia, however his X-ray is negative. No evidence for a urinary tract infection. Blood cultures drawn. Urine cultures sent. The patient qualified for sepsis initially with a fever and tachycardia and elevated lactate. The patient responded to fluid resuscitation and was given broad spectrum antibiotics. Repeat lactate shows a normal level. The patient will be admitted to the hospital for further evaluation and treatment.     Diagnosis:    ICD-10-CM   1. Fever, unspecified fever cause R50.9   2. History of aspiration pneumonia Z87.01   3. Hyponatremia E87.1   4. Severe sepsis (H) A41.9    R65.20       Disposition:  Admitted to Dr. Villavicencio.       I, Samuel Casillas, am serving as a scribe at 11:48 PM on 9/1/2018 to document services personally performed by Dr. Castle, based on my observations and the provider's statements to me.     EMERGENCY DEPARTMENT       Levi Castle MD  09/02/18 0689

## 2018-09-02 NOTE — PROGRESS NOTES
Vitally stable on room air. HECTOR orientation as pt is non verbal. Pt can give thumbs up and thumbs down. Fine crackles heard in bilat lower lobes. Blanchable redness on coccyx. Incontinent of bladder and bowel. GJ tube patent, received meds in gj.  Pt NPO. Tube feed ordered for days. Savannah (mother) is caregiver/gaurdian- see chart for her information.

## 2018-09-02 NOTE — CONSULTS
CLINICAL NUTRITION SERVICES  -  ASSESSMENT NOTE      Recommendations Ordered by Registered Dietitian (RD):   EN Composition: Modified: Isosource 1.5 at 100 mL/hr for 12hrs (7:00am to 7:00pm), provides 1800 kcal (25kcal/kg), 82g protein (1.2g/kg), 211g CHO, 18g fiber and 912mL water.    Malnutrition:   % Weight Loss:  None noted  % Intake:  No decreased intake noted  Subcutaneous Fat Loss:  None observed  Muscle Loss:  Temporal region mild depletion  Fluid Retention:  None noted    Malnutrition Diagnosis: Patient does not meet two of the above criteria necessary for diagnosing malnutrition        REASON FOR ASSESSMENT  Keyon Farias is a 58 year old male seen by Registered Dietitian for Provider Order - Registered Dietitian to Assess and Order TF per Medical Nutrition protocol      NUTRITION HISTORY  -Pt known to our service   -Information obtained from Chart review and mother, Savannah   From Chart review:  -Per RD note    -Pt Lives with his mother, Savannah, who is his caretaker   -TF via GJ tube  (replaced 18 - 16 Fr TORY GJ tube)   -Has been on TF since 2016   -TF provided by [facility]  -Per mother Pt currently doing 4.5 cartons at home. She states that the would like to eventually increase to 5 cartons or even 5.5 cartons, which she states was recommend by recent hospital stay (outside of [place]). Pt receives TF at a rate of 100mL/hr starting at 7:00am and goes until completely finished    Jevity 1.5 (4.5 cartons) provides, 1600 kcal (22kcal/kg), 68g protein (0.9g/kg), 230g CHO, 24g fiber and 810mL free water  -TF given during the day during daytime to ensue upright seated position. Mother reports that when pt wants to lay down, TF will  to 60mL/hr       When pt was here in July he was on Isosource 1.5 at 60 mL/hr = 2160 kcals, 98 gm pro, 1094 mL H20, 22 gm fiber, 253 gm CHO  Nutrisource Fiber 1 packet TID = 45 kcals, 9 gm fiber.  Total (TF + NUtrisource Fiber):  2205 kcals (29 kcal/kg), 31  "gm fiber.      CURRENT NUTRITION ORDERS  Diet Order:     NPO       Current Intake/Tolerance:  -Home enteral nutrition meeting 89% of energy needs and 78% of protein needs. Spoke with mother about increasing amount of TF. Mother would not like to exceed 100mL/hr as a rate and is ok with extending duration of feeding to 12hrs vs 10hrs      PHYSICAL FINDINGS  Observed  Muscle Wasting   Obtained from Chart/Interdisciplinary Team  -GJ tube, 16Fr TORY ---replaced 7/6/18   -9/2: SLP \"Based on chart review, profound dysphagia has not improved despite several years SLP services. Pt unable to make progress toward functional goals at this time. Will sign off\"  -Per RN not \"Blanchable redness on coccyx\" - WOC RN consulted     ANTHROPOMETRICS  Height: 6'0\"  Weight: 160 lbs .86 oz (72.6 kg)  Body mass index is 21.71 kg/(m^2).  Weight Status:  Normal BMI  IBW: 80.9 kg   % IBW: 90%  Weight History: wt fairly stable over the last 6 months   Wt Readings from Last 10 Encounters:   09/02/18 72.6 kg (160 lb 0.9 oz)   07/07/18 74.7 kg (164 lb 10.9 oz)   05/19/18 71.8 kg (158 lb 3.2 oz)   05/05/18 77.4 kg (170 lb 10.2 oz)   04/04/18 72.6 kg (160 lb)   03/13/18 70.3 kg (155 lb)   02/21/18 73 kg (161 lb)   12/31/17 76.2 kg (168 lb)   10/07/17 77.3 kg (170 lb 6.7 oz)   07/27/17 73.9 kg (163 lb)         LABS  Labs reviewed    MEDICATIONS  Neutra-Phos 1 pkt TID      ASSESSED NUTRITION NEEDS PER APPROVED PRACTICE GUIDELINES:    Dosing Weight 72.6 kg (actual wt from 9/1)  Estimated Energy Needs: 3676-2402 kcals (25-30 Kcal/Kg)  Justification: maintenance  Estimated Protein Needs:  grams protein (1.2-1.5 g pro/Kg)  Justification: hypercatabolism with acute illness  Estimated Fluid Needs: 3750-0800  mL (1 mL/Kcal)  Justification: maintenance    MALNUTRITION:  % Weight Loss:  None noted  % Intake:  No decreased intake noted  Subcutaneous Fat Loss:  None observed  Muscle Loss:  Temporal region mild depletion  Fluid Retention:  None " noted    Malnutrition Diagnosis: Patient does not meet two of the above criteria necessary for diagnosing malnutrition      NUTRITION DIAGNOSIS:  Inadequate protein-energy intake related to reliant of enteral nutrition to meet assessed needs as evidenced by home enteral nutrition meeting 89% of energy needs and 78% of protein needs      NUTRITION INTERVENTIONS  Recommendations / Nutrition Prescription  Intermittent TF- Isosource 1.5 for 12hrs (7:00am to 7:00pm)  Water flushes: 120mL Q4hrs      Implementation  1. Nutrition education: Provided education on enteral nutrition and pt assessed needs. Discussed with pt intermittent TF at a rate of 100mL / 12hrs. Discussed with mother that TF be run during the day while pt is upright   2. EN Composition: Modified: Isosource 1.5 at 100 mL/hr for 12hrs (7:00am to 7:00pm), provides 1800 kcal (25kcal/kg), 82g protein (1.2g/kg), 211g CHO, 18g fiber and 912mL water.       Nutrition Goals  Enteral nutrition to meet 90%-110% of assessed needs       MONITORING AND EVALUATION:  Progress towards goals will be monitored and evaluated per protocol and Practice Guidelines      Meliza Washburn RD, LD

## 2018-09-02 NOTE — ED NOTES
Charge RN and additional RN at bedside assisting with IV access. Patient's mother states that he previously had a port placed to vascular access, however the port was d/c'd due to complications. IV access obtained at this time.

## 2018-09-02 NOTE — PLAN OF CARE
"Problem: Patient Care Overview  Goal: Plan of Care/Patient Progress Review  Discharge Planner SLP   Patient plan for discharge: Did not meet with pt; per chart review and pt known from previous admissions, will likely DC home with mother and home care services  Current status: SLP: ST orders received with comment \"for strengthening exercises.\" Pt and mother familiar to ST services with frequent, recurrent aspiration pneumonia. During conversation with mother and this SLP in May 2018, mother decided to stop feeding patient for comfort and remain strict NPO per SLP recommendations. Pt has been working for several years with inpatient and home health SLPs. Latest repeat VFSS in 5/2018 with radiology report, \"Penetration and aspiration with honey thick barium and applesauce mixed with barium without cough response. No epiglottic inversion. Stasis of contrast in the vallecula and piriform sinuses. Nasopharyngeal reflux.\" Based on chart review, profound dysphagia has not improved despite several years SLP services. Pt unable to make progress toward functional goals at this time. Will sign off.   Barriers to return to prior living situation: None from SLP  Recommendations for discharge: home with mother  Rationale for recommendations: No SLP services indicated at this level of care       Entered by: Ryanne Hutton 09/02/2018 8:45 AM         "

## 2018-09-02 NOTE — PLAN OF CARE
Problem: Patient Care Overview  Goal: Plan of Care/Patient Progress Review  Outcome: No Change  Incoherent speech, HECTOR orientation. Contact precautions in place for MRSA and VRE. Full code. VSS on RA. No signs/symptoms of pain. Tube feeding infusing at goal rate of 100ml/hr. On IV Abx. New IV in place in LUE. Incontinent of bladder. Hemiplegia on rt. Side d/t TBI. Turn and repo Q2. Strict NPO maintained. Fall precautions in place. Will cont to monitor.

## 2018-09-02 NOTE — PLAN OF CARE
Problem: Patient Care Overview  Goal: Plan of Care/Patient Progress Review  Discharge Planner PT   Patient plan for discharge: -  Current status: Orders received and chart reviewed. Pt familiar from previous admits. Adm with recurrent sepsis likely secondary to aspiration pna. At baseline, patient is dependent for mobility, ADLs and self cares. Pt receives Home cares including PT/OT/SLP/RN and PCA services. Patient lives with his mother in a handicap accessible home. Pt would benefit from transferring up to chair with assist from nursing with use of ceiling lift and sling and frequent position changes for pressure relief.   Barriers to return to prior living situation: None indicated.   Recommendations for discharge: Return home with assist from mother and Home cares.   Rationale for recommendations: No IP PT needs indicated as pt is at baseline in regards to mobility.       Entered by: Sue Almendarez 09/02/2018 8:41 AM

## 2018-09-03 LAB
ALBUMIN SERPL-MCNC: 2.8 G/DL (ref 3.4–5)
ALP SERPL-CCNC: 79 U/L (ref 40–150)
ALT SERPL W P-5'-P-CCNC: 22 U/L (ref 0–70)
ANION GAP SERPL CALCULATED.3IONS-SCNC: 6 MMOL/L (ref 3–14)
AST SERPL W P-5'-P-CCNC: 18 U/L (ref 0–45)
BACTERIA SPEC CULT: NO GROWTH
BASOPHILS # BLD AUTO: 0.1 10E9/L (ref 0–0.2)
BASOPHILS NFR BLD AUTO: 0.8 %
BILIRUB SERPL-MCNC: 0.3 MG/DL (ref 0.2–1.3)
BUN SERPL-MCNC: 11 MG/DL (ref 7–30)
CALCIUM SERPL-MCNC: 8.3 MG/DL (ref 8.5–10.1)
CHLORIDE SERPL-SCNC: 101 MMOL/L (ref 94–109)
CO2 SERPL-SCNC: 27 MMOL/L (ref 20–32)
CREAT SERPL-MCNC: 0.79 MG/DL (ref 0.66–1.25)
DIFFERENTIAL METHOD BLD: ABNORMAL
EOSINOPHIL # BLD AUTO: 0.7 10E9/L (ref 0–0.7)
EOSINOPHIL NFR BLD AUTO: 10 %
ERYTHROCYTE [DISTWIDTH] IN BLOOD BY AUTOMATED COUNT: 13.1 % (ref 10–15)
GFR SERPL CREATININE-BSD FRML MDRD: >90 ML/MIN/1.7M2
GLUCOSE SERPL-MCNC: 143 MG/DL (ref 70–99)
HCT VFR BLD AUTO: 34.3 % (ref 40–53)
HGB BLD-MCNC: 11.6 G/DL (ref 13.3–17.7)
IMM GRANULOCYTES # BLD: 0 10E9/L (ref 0–0.4)
IMM GRANULOCYTES NFR BLD: 0.3 %
LACTATE BLD-SCNC: 1.5 MMOL/L (ref 0.7–2)
LYMPHOCYTES # BLD AUTO: 2.3 10E9/L (ref 0.8–5.3)
LYMPHOCYTES NFR BLD AUTO: 33.9 %
Lab: NORMAL
MAGNESIUM SERPL-MCNC: 1.9 MG/DL (ref 1.6–2.3)
MCH RBC QN AUTO: 29.6 PG (ref 26.5–33)
MCHC RBC AUTO-ENTMCNC: 33.8 G/DL (ref 31.5–36.5)
MCV RBC AUTO: 88 FL (ref 78–100)
MONOCYTES # BLD AUTO: 0.7 10E9/L (ref 0–1.3)
MONOCYTES NFR BLD AUTO: 10.3 %
NEUTROPHILS # BLD AUTO: 3 10E9/L (ref 1.6–8.3)
NEUTROPHILS NFR BLD AUTO: 44.7 %
NRBC # BLD AUTO: 0 10*3/UL
NRBC BLD AUTO-RTO: 0 /100
PLATELET # BLD AUTO: 130 10E9/L (ref 150–450)
POTASSIUM SERPL-SCNC: 3.7 MMOL/L (ref 3.4–5.3)
PROT SERPL-MCNC: 6.6 G/DL (ref 6.8–8.8)
RBC # BLD AUTO: 3.92 10E12/L (ref 4.4–5.9)
SODIUM SERPL-SCNC: 134 MMOL/L (ref 133–144)
SPECIMEN SOURCE: NORMAL
WBC # BLD AUTO: 6.6 10E9/L (ref 4–11)

## 2018-09-03 PROCEDURE — 25000128 H RX IP 250 OP 636: Performed by: INTERNAL MEDICINE

## 2018-09-03 PROCEDURE — 85025 COMPLETE CBC W/AUTO DIFF WBC: CPT | Performed by: INTERNAL MEDICINE

## 2018-09-03 PROCEDURE — 36415 COLL VENOUS BLD VENIPUNCTURE: CPT | Performed by: INTERNAL MEDICINE

## 2018-09-03 PROCEDURE — 83735 ASSAY OF MAGNESIUM: CPT | Performed by: INTERNAL MEDICINE

## 2018-09-03 PROCEDURE — 27210429 ZZH NUTRITION PRODUCT INTERMEDIATE LITER

## 2018-09-03 PROCEDURE — 99232 SBSQ HOSP IP/OBS MODERATE 35: CPT | Performed by: INTERNAL MEDICINE

## 2018-09-03 PROCEDURE — 25000125 ZZHC RX 250: Performed by: INTERNAL MEDICINE

## 2018-09-03 PROCEDURE — 25000132 ZZH RX MED GY IP 250 OP 250 PS 637: Mod: GY | Performed by: INTERNAL MEDICINE

## 2018-09-03 PROCEDURE — 80053 COMPREHEN METABOLIC PANEL: CPT | Performed by: INTERNAL MEDICINE

## 2018-09-03 PROCEDURE — A9270 NON-COVERED ITEM OR SERVICE: HCPCS | Mod: GY | Performed by: INTERNAL MEDICINE

## 2018-09-03 PROCEDURE — 12000000 ZZH R&B MED SURG/OB

## 2018-09-03 PROCEDURE — 83605 ASSAY OF LACTIC ACID: CPT | Performed by: INTERNAL MEDICINE

## 2018-09-03 RX ADMIN — ASPIRIN 81 MG 81 MG: 81 TABLET ORAL at 09:09

## 2018-09-03 RX ADMIN — CARBAMAZEPINE 150 MG: 100 SUSPENSION ORAL at 17:56

## 2018-09-03 RX ADMIN — HYDROCORTISONE 20 MG: 10 TABLET ORAL at 09:09

## 2018-09-03 RX ADMIN — SIMETHICONE 10 MG: 20 EMULSION ORAL at 06:34

## 2018-09-03 RX ADMIN — CLINDAMYCIN PHOSPHATE 900 MG: 900 INJECTION, SOLUTION INTRAVENOUS at 06:26

## 2018-09-03 RX ADMIN — CARBAMAZEPINE 150 MG: 100 SUSPENSION ORAL at 00:01

## 2018-09-03 RX ADMIN — CLINDAMYCIN PHOSPHATE 900 MG: 900 INJECTION, SOLUTION INTRAVENOUS at 21:20

## 2018-09-03 RX ADMIN — POTASSIUM & SODIUM PHOSPHATES POWDER PACK 280-160-250 MG 1 PACKET: 280-160-250 PACK at 09:09

## 2018-09-03 RX ADMIN — POTASSIUM & SODIUM PHOSPHATES POWDER PACK 280-160-250 MG 1 PACKET: 280-160-250 PACK at 21:13

## 2018-09-03 RX ADMIN — CARBAMAZEPINE 150 MG: 100 SUSPENSION ORAL at 11:39

## 2018-09-03 RX ADMIN — SIMETHICONE 10 MG: 20 EMULSION ORAL at 15:45

## 2018-09-03 RX ADMIN — HYDROCORTISONE 10 MG: 10 TABLET ORAL at 21:13

## 2018-09-03 RX ADMIN — CARBAMAZEPINE 150 MG: 100 SUSPENSION ORAL at 06:25

## 2018-09-03 RX ADMIN — CLINDAMYCIN PHOSPHATE 900 MG: 900 INJECTION, SOLUTION INTRAVENOUS at 14:04

## 2018-09-03 RX ADMIN — LEVOFLOXACIN 750 MG: 5 INJECTION, SOLUTION INTRAVENOUS at 09:13

## 2018-09-03 RX ADMIN — LEVOTHYROXINE SODIUM 125 MCG: 125 TABLET ORAL at 06:34

## 2018-09-03 RX ADMIN — POTASSIUM & SODIUM PHOSPHATES POWDER PACK 280-160-250 MG 1 PACKET: 280-160-250 PACK at 15:45

## 2018-09-03 ASSESSMENT — ACTIVITIES OF DAILY LIVING (ADL)
ADLS_ACUITY_SCORE: 44

## 2018-09-03 NOTE — PLAN OF CARE
Problem: Patient Care Overview  Goal: Plan of Care/Patient Progress Review  Outcome: Improving  Nonverbal, right side hemiplegic, vss, on RA, LS diminished, total cares,turn and repo q 2hrs, fired for sepsis, LA 1.5, BP low side 90s- 100s/ 60s, NPO, TF at 100 ml/ hr with 120 ml flush Q 4hrs,Na 134, continues on iv abx, maintained contact isolation, Mother at bed side, possible discharge  tomorrow on po abx.
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
## 2018-09-03 NOTE — PLAN OF CARE
Problem: Patient Care Overview  Goal: Plan of Care/Patient Progress Review  Outcome: No Change  Pt has incoherent speech, unable to assess orientation.  VSS on RA.  Denies pain.  Right-sided hemiparesis.  Up w/A2 and lift.  Incontinent of bowel and bladder.  Tube feeding stopped at 1900 per plan of care, free water flushes every four hours continue.  Contact precautions maintained.  Turned and repositioned every two hours.  NPO.  Non-blanchable redness on coccyx.  Nursing to continue to monitor.

## 2018-09-03 NOTE — PLAN OF CARE
Problem: Patient Care Overview  Goal: Plan of Care/Patient Progress Review  Outcome: Improving  Incoherent speech, per mother orientation intact. Contact precautions in place for MRSA and VRE. VSS on RA, afebrile. No signs/symptoms of pain. Tube feeding infusing at goal rate of 100ml/hr, will stop at 1900 per plan, free water flashes q4h. On IV Abx. Incontinent of bladder. Hemiplegia on rt. Turn and repo Q2. Strict NPO maintained. Fall precautions in place. Na 129, decreased free water flushes, monitor. Will cont to monitor.

## 2018-09-03 NOTE — PHARMACY-ADMISSION MEDICATION HISTORY
"Admission medication history interview status for the 9/1/2018  admission is complete. See EPIC admission navigator for prior to admission medications     Medication history source reliability:Good    Actions taken by pharmacist (provider contacted, etc): called pt's mother to obtain home med list     Additional medication history information not noted on PTA med list : pt's mother states she is not administering aspirin suspension to patient and the medication does not sound familiar- she is unsure if this is a medication the pt should be on and wants to talk to the physician in the hospital if the pt should take it for heart health. I left the medication in the med list for physician to review, did not diamond the \"taking\" check box.     Medication reconciliation/reorder completed by provider prior to medication history? No    Time spent in this activity: 35 min    Prior to Admission medications    Medication Sig Last Dose Taking? Auth Provider   ACETAMINOPHEN  mg by Per Feeding Tube route every 4 hours as needed for pain  at prn Yes Unknown, Entered By History   albuterol (2.5 MG/3ML) 0.083% neb solution Take 1 vial by nebulization every 4 hours as needed for shortness of breath / dyspnea or wheezing  at prn Yes Unknown, Entered By History   bacitracin ointment Apply topically daily as needed for wound care To PEG site. 9/2/2018 at am Yes Unknown, Entered By History   Brivaracetam (BRIVIACT) 10 MG/ML solution Take 100 mg by mouth 2 times daily 9/1/2018 at 2100 Yes Reported, Patient   calcium carbonate 1250 (500 CA) MG/5ML SUSP suspension 5 mLs (1,250 mg) by Per J Tube route 3 times daily (with meals) 9/1/2018 at 2100 Yes Mariana Venegas MD   carBAMazepine (TEGRETOL) 100 MG/5ML suspension Take 150 mg by mouth every 6 hours 9/1/2018 at 2100 Yes Unknown, Entered By History   hydrocortisone (CORTEF) 2 mg/mL SUSP Take 15 mg by mouth every morning 9/1/2018 at am Yes Unknown, Entered By History "   hydrocortisone (CORTEF) 2 mg/mL Take 5 mLs (10 mg) by mouth every evening 9/1/2018 at pm  Yes Mariana Venegas MD   levothyroxine (SYNTHROID) 25 mcg/mL SUSP 5 mLs (125 mcg) by Per J Tube route every morning (before breakfast) 9/1/2018 at am Yes Cele Maldonado MD   melatonin (MELATONIN) 1 MG/ML LIQD liquid 6 mg by Jejunal Tube route nightly as needed for sleep  at prn Yes Unknown, Entered By History   Multiple Vitamins-Minerals (CENTRUM SILVER) per tablet Take 1 tablet by mouth daily Crush and feed via j-tube 9/1/2018 at am Yes Unknown, Entered By History   pantoprazole (PROTONIX) SUSP suspension Take 20 mg by mouth every morning  9/1/2018 at am Yes Unknown, Entered By History   potassium & sodium phosphates (NEUTRA-PHOS) 280-160-250 MG Packet Take 1 packet by mouth 3 times daily 0900, 1500, 2100.  9/1/2018 at 2100 Yes Unknown, Entered By History   testosterone cypionate (DEPOTESTOTERONE CYPIONATE) 200 MG/ML injection Inject 100 mg into the muscle See Admin Instructions Every 2 weeks. 8/24/2018 at Unknown time Yes Unknown, Entered By History   VITAMIN D, CHOLECALCIFEROL, PO Take 2,000 Units by mouth daily  9/1/2018 at am Yes Unknown, Entered By History   Wheat Dextrin (BENEFIBER PO) Take 2 teaspoonful by mouth daily  9/1/2018 at am Yes Unknown, Entered By History   aspirin 10 mg/mL 8.1 mLs (81 mg) by Per J Tube route daily   Mariana Venegas MD

## 2018-09-04 VITALS
WEIGHT: 159.39 LBS | BODY MASS INDEX: 21.62 KG/M2 | HEART RATE: 81 BPM | SYSTOLIC BLOOD PRESSURE: 97 MMHG | TEMPERATURE: 97.4 F | OXYGEN SATURATION: 97 % | RESPIRATION RATE: 16 BRPM | DIASTOLIC BLOOD PRESSURE: 56 MMHG

## 2018-09-04 PROCEDURE — 25000128 H RX IP 250 OP 636: Performed by: INTERNAL MEDICINE

## 2018-09-04 PROCEDURE — 27210429 ZZH NUTRITION PRODUCT INTERMEDIATE LITER

## 2018-09-04 PROCEDURE — 25000132 ZZH RX MED GY IP 250 OP 250 PS 637: Mod: GY | Performed by: INTERNAL MEDICINE

## 2018-09-04 PROCEDURE — A9270 NON-COVERED ITEM OR SERVICE: HCPCS | Mod: GY | Performed by: INTERNAL MEDICINE

## 2018-09-04 PROCEDURE — 99238 HOSP IP/OBS DSCHRG MGMT 30/<: CPT | Performed by: INTERNAL MEDICINE

## 2018-09-04 PROCEDURE — 25000125 ZZHC RX 250: Performed by: INTERNAL MEDICINE

## 2018-09-04 RX ADMIN — CARBAMAZEPINE 150 MG: 100 SUSPENSION ORAL at 05:34

## 2018-09-04 RX ADMIN — POTASSIUM & SODIUM PHOSPHATES POWDER PACK 280-160-250 MG 1 PACKET: 280-160-250 PACK at 08:08

## 2018-09-04 RX ADMIN — ASPIRIN 81 MG 81 MG: 81 TABLET ORAL at 08:08

## 2018-09-04 RX ADMIN — CARBAMAZEPINE 150 MG: 100 SUSPENSION ORAL at 11:40

## 2018-09-04 RX ADMIN — SIMETHICONE 10 MG: 20 EMULSION ORAL at 07:01

## 2018-09-04 RX ADMIN — CARBAMAZEPINE 150 MG: 100 SUSPENSION ORAL at 00:40

## 2018-09-04 RX ADMIN — LEVOFLOXACIN 750 MG: 5 INJECTION, SOLUTION INTRAVENOUS at 08:08

## 2018-09-04 RX ADMIN — LEVOTHYROXINE SODIUM 125 MCG: 125 TABLET ORAL at 07:01

## 2018-09-04 RX ADMIN — HYDROCORTISONE 20 MG: 10 TABLET ORAL at 08:08

## 2018-09-04 RX ADMIN — CLINDAMYCIN PHOSPHATE 900 MG: 900 INJECTION, SOLUTION INTRAVENOUS at 05:34

## 2018-09-04 ASSESSMENT — ACTIVITIES OF DAILY LIVING (ADL)
ADLS_ACUITY_SCORE: 44
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
## 2018-09-04 NOTE — PROGRESS NOTES
Discharge    Patient discharged to home via wheelchair with mother.  Care plan note: Alert, HECTOR orientation d/t non-verbal disposition. Full code. VSS on RA. Denied pain. Turn and repo Q2. Incontinent of B/B. Up w/ lift. TF infusing. IV Abx ordered. IV now removed. Pt. Discharged with all belongings.     Listed belongings gathered and returned to patient: Yes  Care Plan and Patient education resolved: YES  Prescriptions if needed, hard copies sent with patient: NA  Home and hospital acquired medications returned to patient: YES  Medication Bin checked and emptied on discharge: YES  Follow up appointment made for patient: YES

## 2018-09-04 NOTE — PLAN OF CARE
Problem: Patient Care Overview  Goal: Plan of Care/Patient Progress Review  Outcome: Improving  A&Ox2. VSS. Denies pain. Ls dim, with coarse sounds that clear with cough. T/R q2 Bottom red, blanchable. Dressing in place. Plan to discharge pending.

## 2018-09-04 NOTE — CONSULTS
Care Transition Initial Assessment - RN        Met with: Patient's mother Savannah who is the Primary Caregiver for her son.  DATA   Active Problems:    Aspiration pneumonia (H)       Cognitive Status: alert, non-verbal (TBI)        Contact information and PCP information verified: Yes  Lives With: his mother who is patient's Legal Guardian and Primary Caregiver        Insurance concerns: Insurance Plan    ASSESSMENT  Patient currently receives the following services:   Home Care Services with [facility] [place] T[facility] Fe[facility], [facility], [facility]-for Speech and OT Therapies. All Home Health Service-for PCA (12:00 am till 7 am)    Identified issues/concerns regarding health management:  Recurrent hospitalizations secondary to aspiration pneumonia/pneumonitis     PLAN  Financial costs for the patient include: N/A .  Patient given options and choices for discharge N/A .  Patient/family is agreeable to the plan?  Yes, spoke with patient's Primary Caregiver and Legal Guardian-who is patient's mother Savannah.  Patient anticipates discharging to his home.        Patient anticipates needs for home equipment: Yes (he already has everything he needs)  Plan/Disposition: Home   Appointments: PCP appointment made for him on 9/11 10:00 AM       Care  (CTS) will continue to follow as needed.

## 2018-09-11 ENCOUNTER — HOSPITAL ENCOUNTER (OUTPATIENT)
Dept: LAB | Facility: CLINIC | Age: 58
Discharge: HOME | End: 2018-09-11
Attending: PSYCHIATRY & NEUROLOGY | Admitting: PSYCHIATRY & NEUROLOGY
Payer: MEDICARE

## 2018-09-11 DIAGNOSIS — G81.10 SPASTIC HEMIPLEGIA (H): ICD-10-CM

## 2018-09-11 DIAGNOSIS — G40.011 LOCALIZATION-RELATED IDIOPATHIC EPILEPSY AND EPILEPTIC SYNDROMES WITH SEIZURES OF LOCALIZED ONSET, INTRACTABLE, WITH STATUS EPILEPTICUS (H): Primary | ICD-10-CM

## 2018-09-11 DIAGNOSIS — Z79.899 HIGH RISK MEDICATION USE: ICD-10-CM

## 2018-09-11 LAB
ALBUMIN SERPL-MCNC: 3.7 G/DL (ref 3.4–5)
ALP SERPL-CCNC: 109 U/L (ref 40–150)
ALT SERPL W P-5'-P-CCNC: 38 U/L (ref 0–70)
ANION GAP SERPL CALCULATED.3IONS-SCNC: 6 MMOL/L (ref 3–14)
AST SERPL W P-5'-P-CCNC: 24 U/L (ref 0–45)
BILIRUB SERPL-MCNC: 0.3 MG/DL (ref 0.2–1.3)
BUN SERPL-MCNC: 13 MG/DL (ref 7–30)
CALCIUM SERPL-MCNC: 9.2 MG/DL (ref 8.5–10.1)
CARBAMAZEPINE SERPL-MCNC: 14.8 MG/L (ref 4–12)
CHLORIDE SERPL-SCNC: 95 MMOL/L (ref 94–109)
CO2 SERPL-SCNC: 30 MMOL/L (ref 20–32)
CREAT SERPL-MCNC: 0.76 MG/DL (ref 0.66–1.25)
ERYTHROCYTE [DISTWIDTH] IN BLOOD BY AUTOMATED COUNT: 12.8 % (ref 10–15)
GFR SERPL CREATININE-BSD FRML MDRD: >90 ML/MIN/1.7M2
GLUCOSE SERPL-MCNC: 86 MG/DL (ref 70–99)
HCT VFR BLD AUTO: 40.8 % (ref 40–53)
HGB BLD-MCNC: 13.9 G/DL (ref 13.3–17.7)
MCH RBC QN AUTO: 29.7 PG (ref 26.5–33)
MCHC RBC AUTO-ENTMCNC: 34.1 G/DL (ref 31.5–36.5)
MCV RBC AUTO: 87 FL (ref 78–100)
PLATELET # BLD AUTO: 205 10E9/L (ref 150–450)
POTASSIUM SERPL-SCNC: 4.3 MMOL/L (ref 3.4–5.3)
PROT SERPL-MCNC: 8.4 G/DL (ref 6.8–8.8)
RBC # BLD AUTO: 4.68 10E12/L (ref 4.4–5.9)
SODIUM SERPL-SCNC: 131 MMOL/L (ref 133–144)
WBC # BLD AUTO: 7 10E9/L (ref 4–11)

## 2018-09-11 PROCEDURE — 80053 COMPREHEN METABOLIC PANEL: CPT | Performed by: PSYCHIATRY & NEUROLOGY

## 2018-09-11 PROCEDURE — 80156 ASSAY CARBAMAZEPINE TOTAL: CPT | Performed by: PSYCHIATRY & NEUROLOGY

## 2018-09-11 PROCEDURE — 85027 COMPLETE CBC AUTOMATED: CPT | Performed by: PSYCHIATRY & NEUROLOGY

## 2018-09-11 PROCEDURE — 36415 COLL VENOUS BLD VENIPUNCTURE: CPT | Performed by: PSYCHIATRY & NEUROLOGY

## 2018-09-15 ENCOUNTER — HOSPITAL ENCOUNTER (INPATIENT)
Facility: CLINIC | Age: 58
LOS: 6 days | Discharge: HOME HEALTH | DRG: 871 | End: 2018-09-21
Attending: NURSE PRACTITIONER | Admitting: INTERNAL MEDICINE
Payer: MEDICARE

## 2018-09-15 ENCOUNTER — APPOINTMENT (OUTPATIENT)
Dept: GENERAL RADIOLOGY | Facility: CLINIC | Age: 58
DRG: 871 | End: 2018-09-15
Attending: NURSE PRACTITIONER
Payer: MEDICARE

## 2018-09-15 DIAGNOSIS — K21.9 GASTROESOPHAGEAL REFLUX DISEASE WITHOUT ESOPHAGITIS: ICD-10-CM

## 2018-09-15 DIAGNOSIS — G40.909 SEIZURE DISORDER (H): ICD-10-CM

## 2018-09-15 DIAGNOSIS — R13.10 DYSPHAGIA, UNSPECIFIED TYPE: ICD-10-CM

## 2018-09-15 DIAGNOSIS — R78.81 BACTEREMIA: ICD-10-CM

## 2018-09-15 DIAGNOSIS — R19.8 ALTERED BOWEL FUNCTION: ICD-10-CM

## 2018-09-15 DIAGNOSIS — E87.0 HYPERNATREMIA: ICD-10-CM

## 2018-09-15 DIAGNOSIS — E87.6 HYPOKALEMIA: ICD-10-CM

## 2018-09-15 DIAGNOSIS — K11.7 DISTURBANCE OF SALIVARY SECRETION: ICD-10-CM

## 2018-09-15 DIAGNOSIS — A41.9 SEPSIS, DUE TO UNSPECIFIED ORGANISM: ICD-10-CM

## 2018-09-15 DIAGNOSIS — J69.0 ASPIRATION PNEUMONIA OF BOTH LOWER LOBES DUE TO GASTRIC SECRETIONS (H): Primary | ICD-10-CM

## 2018-09-15 LAB
ALBUMIN SERPL-MCNC: 3.1 G/DL (ref 3.4–5)
ALBUMIN UR-MCNC: 10 MG/DL
ALP SERPL-CCNC: 94 U/L (ref 40–150)
ALT SERPL W P-5'-P-CCNC: 24 U/L (ref 0–70)
AMORPH CRY #/AREA URNS HPF: ABNORMAL /HPF
ANION GAP SERPL CALCULATED.3IONS-SCNC: 8 MMOL/L (ref 3–14)
APPEARANCE UR: CLEAR
AST SERPL W P-5'-P-CCNC: 14 U/L (ref 0–45)
BASOPHILS # BLD AUTO: 0 10E9/L (ref 0–0.2)
BASOPHILS NFR BLD AUTO: 0.3 %
BILIRUB SERPL-MCNC: 0.3 MG/DL (ref 0.2–1.3)
BILIRUB UR QL STRIP: NEGATIVE
BUN SERPL-MCNC: 15 MG/DL (ref 7–30)
CALCIUM SERPL-MCNC: 8.5 MG/DL (ref 8.5–10.1)
CARBAMAZEPINE SERPL-MCNC: 14.6 MG/L (ref 4–12)
CHLORIDE SERPL-SCNC: 96 MMOL/L (ref 94–109)
CO2 SERPL-SCNC: 28 MMOL/L (ref 20–32)
COLOR UR AUTO: YELLOW
CREAT SERPL-MCNC: 0.76 MG/DL (ref 0.66–1.25)
DIFFERENTIAL METHOD BLD: ABNORMAL
EOSINOPHIL # BLD AUTO: 0.3 10E9/L (ref 0–0.7)
EOSINOPHIL NFR BLD AUTO: 2.2 %
ERYTHROCYTE [DISTWIDTH] IN BLOOD BY AUTOMATED COUNT: 12.8 % (ref 10–15)
GFR SERPL CREATININE-BSD FRML MDRD: >90 ML/MIN/1.7M2
GLUCOSE SERPL-MCNC: 92 MG/DL (ref 70–99)
GLUCOSE UR STRIP-MCNC: NEGATIVE MG/DL
HCT VFR BLD AUTO: 37.7 % (ref 40–53)
HGB BLD-MCNC: 12.8 G/DL (ref 13.3–17.7)
HGB UR QL STRIP: NEGATIVE
IMM GRANULOCYTES # BLD: 0 10E9/L (ref 0–0.4)
IMM GRANULOCYTES NFR BLD: 0.2 %
KETONES UR STRIP-MCNC: NEGATIVE MG/DL
LACTATE BLD-SCNC: 4.5 MMOL/L (ref 0.7–2)
LACTATE BLD-SCNC: 5 MMOL/L (ref 0.7–2)
LEUKOCYTE ESTERASE UR QL STRIP: NEGATIVE
LYMPHOCYTES # BLD AUTO: 1.8 10E9/L (ref 0.8–5.3)
LYMPHOCYTES NFR BLD AUTO: 15.1 %
MCH RBC QN AUTO: 29.6 PG (ref 26.5–33)
MCHC RBC AUTO-ENTMCNC: 34 G/DL (ref 31.5–36.5)
MCV RBC AUTO: 87 FL (ref 78–100)
MONOCYTES # BLD AUTO: 0.5 10E9/L (ref 0–1.3)
MONOCYTES NFR BLD AUTO: 4.5 %
NEUTROPHILS # BLD AUTO: 9.3 10E9/L (ref 1.6–8.3)
NEUTROPHILS NFR BLD AUTO: 77.7 %
NITRATE UR QL: NEGATIVE
NRBC # BLD AUTO: 0 10*3/UL
NRBC BLD AUTO-RTO: 0 /100
PH UR STRIP: 8 PH (ref 5–7)
PLATELET # BLD AUTO: 158 10E9/L (ref 150–450)
POTASSIUM SERPL-SCNC: 4.7 MMOL/L (ref 3.4–5.3)
PROT SERPL-MCNC: 7 G/DL (ref 6.8–8.8)
RBC # BLD AUTO: 4.32 10E12/L (ref 4.4–5.9)
RBC #/AREA URNS AUTO: <1 /HPF (ref 0–2)
SODIUM SERPL-SCNC: 132 MMOL/L (ref 133–144)
SOURCE: ABNORMAL
SP GR UR STRIP: 1.02 (ref 1–1.03)
UROBILINOGEN UR STRIP-MCNC: 2 MG/DL (ref 0–2)
WBC # BLD AUTO: 12 10E9/L (ref 4–11)
WBC #/AREA URNS AUTO: 2 /HPF (ref 0–5)

## 2018-09-15 PROCEDURE — 51702 INSERT TEMP BLADDER CATH: CPT

## 2018-09-15 PROCEDURE — 99285 EMERGENCY DEPT VISIT HI MDM: CPT | Mod: 25

## 2018-09-15 PROCEDURE — 96375 TX/PRO/DX INJ NEW DRUG ADDON: CPT

## 2018-09-15 PROCEDURE — 25000132 ZZH RX MED GY IP 250 OP 250 PS 637: Mod: GY | Performed by: NURSE PRACTITIONER

## 2018-09-15 PROCEDURE — 25000128 H RX IP 250 OP 636: Performed by: NURSE PRACTITIONER

## 2018-09-15 PROCEDURE — 87186 SC STD MICRODIL/AGAR DIL: CPT | Performed by: NURSE PRACTITIONER

## 2018-09-15 PROCEDURE — 87040 BLOOD CULTURE FOR BACTERIA: CPT | Performed by: NURSE PRACTITIONER

## 2018-09-15 PROCEDURE — 87800 DETECT AGNT MULT DNA DIREC: CPT | Performed by: NURSE PRACTITIONER

## 2018-09-15 PROCEDURE — 81001 URINALYSIS AUTO W/SCOPE: CPT | Performed by: NURSE PRACTITIONER

## 2018-09-15 PROCEDURE — 80156 ASSAY CARBAMAZEPINE TOTAL: CPT | Performed by: NURSE PRACTITIONER

## 2018-09-15 PROCEDURE — 96367 TX/PROPH/DG ADDL SEQ IV INF: CPT

## 2018-09-15 PROCEDURE — 87077 CULTURE AEROBIC IDENTIFY: CPT | Performed by: NURSE PRACTITIONER

## 2018-09-15 PROCEDURE — 36415 COLL VENOUS BLD VENIPUNCTURE: CPT | Performed by: NURSE PRACTITIONER

## 2018-09-15 PROCEDURE — 83605 ASSAY OF LACTIC ACID: CPT | Performed by: NURSE PRACTITIONER

## 2018-09-15 PROCEDURE — 20000003 ZZH R&B ICU

## 2018-09-15 PROCEDURE — 71046 X-RAY EXAM CHEST 2 VIEWS: CPT

## 2018-09-15 PROCEDURE — 80053 COMPREHEN METABOLIC PANEL: CPT | Performed by: NURSE PRACTITIONER

## 2018-09-15 PROCEDURE — 96366 THER/PROPH/DIAG IV INF ADDON: CPT

## 2018-09-15 PROCEDURE — 25000128 H RX IP 250 OP 636

## 2018-09-15 PROCEDURE — 96365 THER/PROPH/DIAG IV INF INIT: CPT

## 2018-09-15 PROCEDURE — A9270 NON-COVERED ITEM OR SERVICE: HCPCS | Mod: GY | Performed by: NURSE PRACTITIONER

## 2018-09-15 PROCEDURE — 85025 COMPLETE CBC W/AUTO DIFF WBC: CPT | Performed by: NURSE PRACTITIONER

## 2018-09-15 PROCEDURE — 96361 HYDRATE IV INFUSION ADD-ON: CPT

## 2018-09-15 PROCEDURE — 87086 URINE CULTURE/COLONY COUNT: CPT | Performed by: NURSE PRACTITIONER

## 2018-09-15 RX ORDER — ONDANSETRON 2 MG/ML
4 INJECTION INTRAMUSCULAR; INTRAVENOUS ONCE
Status: COMPLETED | OUTPATIENT
Start: 2018-09-15 | End: 2018-09-15

## 2018-09-15 RX ORDER — ONDANSETRON 2 MG/ML
INJECTION INTRAMUSCULAR; INTRAVENOUS
Status: COMPLETED
Start: 2018-09-15 | End: 2018-09-15

## 2018-09-15 RX ORDER — LEVOTHYROXINE SODIUM 137 UG/1
137 TABLET ORAL DAILY
Status: ON HOLD | COMMUNITY
End: 2019-06-18

## 2018-09-15 RX ORDER — MULTIVIT-MIN/IRON/FOLIC ACID/K 18-600-40
2000 CAPSULE ORAL EVERY MORNING
COMMUNITY
End: 2019-08-12

## 2018-09-15 RX ORDER — PIPERACILLIN SODIUM, TAZOBACTAM SODIUM 4; .5 G/20ML; G/20ML
4.5 INJECTION, POWDER, LYOPHILIZED, FOR SOLUTION INTRAVENOUS ONCE
Status: COMPLETED | OUTPATIENT
Start: 2018-09-15 | End: 2018-09-15

## 2018-09-15 RX ORDER — SODIUM CHLORIDE, SODIUM LACTATE, POTASSIUM CHLORIDE, CALCIUM CHLORIDE 600; 310; 30; 20 MG/100ML; MG/100ML; MG/100ML; MG/100ML
1000 INJECTION, SOLUTION INTRAVENOUS CONTINUOUS
Status: DISCONTINUED | OUTPATIENT
Start: 2018-09-15 | End: 2018-09-16

## 2018-09-15 RX ORDER — ACETAMINOPHEN 500 MG
1000 TABLET ORAL EVERY 6 HOURS PRN
Status: ON HOLD | COMMUNITY
End: 2019-09-14

## 2018-09-15 RX ORDER — HYDROCORTISONE 10 MG/1
20 TABLET ORAL EVERY MORNING
COMMUNITY
End: 2021-01-20

## 2018-09-15 RX ORDER — HYDROCORTISONE 10 MG/1
10 TABLET ORAL
Status: ON HOLD | COMMUNITY
End: 2021-02-16

## 2018-09-15 RX ORDER — SODIUM CHLORIDE, SODIUM LACTATE, POTASSIUM CHLORIDE, CALCIUM CHLORIDE 600; 310; 30; 20 MG/100ML; MG/100ML; MG/100ML; MG/100ML
INJECTION, SOLUTION INTRAVENOUS CONTINUOUS
Status: DISCONTINUED | OUTPATIENT
Start: 2018-09-15 | End: 2018-09-16

## 2018-09-15 RX ADMIN — PIPERACILLIN SODIUM,TAZOBACTAM SODIUM 4.5 G: 4; .5 INJECTION, POWDER, FOR SOLUTION INTRAVENOUS at 21:21

## 2018-09-15 RX ADMIN — AZITHROMYCIN MONOHYDRATE 500 MG: 500 INJECTION, POWDER, LYOPHILIZED, FOR SOLUTION INTRAVENOUS at 21:59

## 2018-09-15 RX ADMIN — HYDROCORTISONE SODIUM SUCCINATE 100 MG: 100 INJECTION, POWDER, FOR SOLUTION INTRAMUSCULAR; INTRAVENOUS at 23:40

## 2018-09-15 RX ADMIN — ONDANSETRON 4 MG: 2 INJECTION INTRAMUSCULAR; INTRAVENOUS at 20:23

## 2018-09-15 RX ADMIN — SODIUM CHLORIDE, POTASSIUM CHLORIDE, SODIUM LACTATE AND CALCIUM CHLORIDE 2169 ML: 600; 310; 30; 20 INJECTION, SOLUTION INTRAVENOUS at 20:58

## 2018-09-15 RX ADMIN — SODIUM CHLORIDE, POTASSIUM CHLORIDE, SODIUM LACTATE AND CALCIUM CHLORIDE 1000 ML: 600; 310; 30; 20 INJECTION, SOLUTION INTRAVENOUS at 23:54

## 2018-09-15 RX ADMIN — SODIUM CHLORIDE, POTASSIUM CHLORIDE, SODIUM LACTATE AND CALCIUM CHLORIDE: 600; 310; 30; 20 INJECTION, SOLUTION INTRAVENOUS at 21:58

## 2018-09-15 RX ADMIN — SODIUM CHLORIDE, POTASSIUM CHLORIDE, SODIUM LACTATE AND CALCIUM CHLORIDE 1000 ML: 600; 310; 30; 20 INJECTION, SOLUTION INTRAVENOUS at 19:58

## 2018-09-15 RX ADMIN — ACETAMINOPHEN 1000 MG: 160 SUSPENSION ORAL at 20:46

## 2018-09-15 ASSESSMENT — ENCOUNTER SYMPTOMS
FEVER: 1
ACTIVITY CHANGE: 1
CHILLS: 1
TREMORS: 1

## 2018-09-15 NOTE — Clinical Note
Admitting Physician: ROSELIA SCANLON [[id number]]   Special needs: Isolation [1]   Bed request comments: Inpt- [facility]

## 2018-09-15 NOTE — IP AVS SNAPSHOT
[place] [address] [facility]: [phone]                                              INTERAGENCY TRANSFER FORM - PHYSICIAN ORDERS   9/15/2018                    Hospital Admission Date: 9/15/2018  BERT BARAJAS   : 1960  Sex: Male        Attending Provider: Yanely Liriano MD     Allergies:  Dilantin [Phenytoin Sodium], Valproic Acid    Infection:  MRSA, VRE   Service:  HOSPITALIST    Ht:  1.829 m (6')   Wt:  78 kg (171 lb 15.3 oz)   Admission Wt:  76.2 kg (167 lb 15.9 oz)    BMI:  23.32 kg/m 2   BSA:  1.99 m 2            Patient PCP Information     Provider PCP Type    Carlos Gomez MD General      ED Clinical Impression     Diagnosis Description Comment Added By Time Added    Aspiration pneumonia of both lower lobes due to gastric secretions (H) [J69.0] Aspiration pneumonia of both lower lobes due to gastric secretions (H) [J69.0]  Lakia Beebe PA-C 2018 12:09 PM    Sepsis, due to unspecified organism (H) [A41.9] Sepsis, due to unspecified organism (H) [A41.9]  Lakia Beebe PA-C 2018 12:09 PM    Bacteremia [R78.81] Bacteremia [R78.81]  Lakia Beebe PA-C 2018 12:09 PM    Hypernatremia [E87.0] Hypernatremia [E87.0]  Lakia Beebe PA-C 2018 12:09 PM    Hypokalemia [E87.6] Hypokalemia [E87.6]  Lakia Beebe PA-C 2018 12:09 PM    Seizure disorder (H) [G40.909] Seizure disorder (H) [G40.909]  Lakia Beebe PA-C 2018 12:10 PM    Altered bowel function [R19.4] Altered bowel function [R19.4]  Lakia Beebe PA-C 2018 12:12 PM    Disturbance of salivary secretion [K11.7] Disturbance of salivary secretion [K11.7]  Lakia Beebe PA-C 2018 12:13 PM    Gastroesophageal reflux disease without esophagitis [K21.9] Gastroesophageal reflux disease without esophagitis [K21.9]  Lakia Beebe  Valdez Lezama PA-C 9/21/2018 12:15 PM    Dysphagia, unspecified type [R13.10] Dysphagia, unspecified type [R13.10]  Lakia Beebe PA-C 9/21/2018 12:20 PM      Hospital Problems as of 9/21/2018              Priority Class Noted POA    Sepsis (H) Medium  11/15/2015 Yes    Aspiration pneumonia (H) Medium  5/1/2018 Yes      Non-Hospital Problems as of 9/21/2018              Priority Class Noted    Appendicitis Medium  7/30/2013    Panhypopituitarism (H) Medium  Unknown    Hematemesis Medium  3/13/2014    Seizure (H) Medium  7/4/2014    Seizures (H) Medium  10/4/2014    Recurrent seizures (H) Medium  3/8/2015    Septic shock (H) Medium  1/6/2016    Pneumonia of both lower lobes due to infectious organism Medium  1/6/2016    Community acquired pneumonia Medium  1/6/2016    Breakthrough seizure (H) Medium  7/24/2016    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  8/23/2016    Hyponatremia Medium  9/9/2016    PEG tube malfunction (H) Medium  10/31/2016    Pneumonia Medium  11/23/2016    Cardiac arrest (H) Medium  11/26/2016    Acute respiratory failure with hypoxia (H) Medium  11/26/2016    Necrotizing pancreatitis Medium  1/23/2017    Pneumonia, aspiration (H) Medium  6/5/2017    Encephalopathy Medium  6/6/2017    Fever Medium  12/26/2017    ACP (advance care planning) Medium  7/6/2018      Code Status History     Date Active Date Inactive Code Status Order ID Comments User Context    9/21/2018 12:21 PM  Full Code [id number]  Lakia Beebe PA-C Outpatient    9/16/2018  4:03 AM 9/21/2018 12:21 PM Full Code [id number]  Manish Motta MD Inpatient    9/4/2018 11:13 AM 9/16/2018  4:03 AM Full Code [id number]  Manish Motta MD Outpatient    9/2/2018  4:48 AM 9/4/2018 11:13 AM Full Code [id number]  Jerald Villavicencio MD Inpatient    7/8/2018 10:53 AM 9/2/2018  4:48 AM Full Code [id number]  Cele Maldonado MD Outpatient    7/5/2018   5:17 AM 7/8/2018 10:53 AM Full Code [id number]  Elen Carrillo MD Inpatient    5/19/2018 10:10 AM 7/5/2018  5:17 AM Full Code [id number]  Pamela Soliz MD Outpatient    5/16/2018  3:51 AM 5/19/2018 10:10 AM Full Code [id number]  Isreal Christianson MD Inpatient    5/5/2018 10:55 AM 5/16/2018  3:51 AM Full Code [id number]  Cele Maldonado MD Outpatient    5/1/2018 11:43 PM 5/5/2018 10:55 AM Full Code [id number]  Leticia Santiago MD Inpatient    4/4/2018  9:53 AM 5/1/2018 11:43 PM Full Code [id number]  Migel Stoddard MD Outpatient    4/1/2018 12:47 AM 4/4/2018  9:53 AM Full Code [id number]  Rupali Costa MD Inpatient    2/21/2018 11:38 AM 4/1/2018 12:47 AM Full Code [id number]  Yogi Irizarry MD Outpatient    2/14/2018 12:36 PM 2/21/2018 11:38 AM Full Code [id number]  Demarco Ash MD Inpatient    12/31/2017 12:16 PM 2/14/2018 12:36 PM Full Code [id number]  Jossie Higgins MD Outpatient    12/26/2017  4:20 PM 12/31/2017 12:16 PM Full Code [id number]  Lakia Jimenez PA-C Inpatient    10/7/2017 10:09 AM 12/26/2017  4:20 PM Full Code [id number]  Migel Stoddard MD Outpatient    10/4/2017  1:08 AM 10/7/2017 10:09 AM Full Code [id number]  Manish Meier MD Inpatient    6/7/2017 10:05 AM 10/4/2017  1:08 AM Full Code [id number]  Hayden Epperson DO Outpatient    6/5/2017  1:07 AM 6/7/2017 10:05 AM Full Code [id number]  Manish Motta MD Inpatient    1/23/2017  6:04 PM 2/10/2017  3:21 PM Full Code [id number]  Yoel Montgomery MD Inpatient    1/23/2017 12:35 PM 1/23/2017  6:04 PM Full Code [id number]  Shaneka Clark PA-C Outpatient    1/21/2017  6:33 PM 1/23/2017 12:35 PM Full Code [id number]  Marquez Green MD Inpatient    12/9/2016 10:25 AM 1/21/2017  6:33 PM Full Code [id number]  Elen Carrillo MD Outpatient    12/8/2016 11:30 AM 12/9/2016 10:25 AM Full Code [id number]  Alicia Roca APRN [facility] Outpatient    11/23/2016  6:52 PM 12/8/2016 11:30 AM  Full Code [id number]  Shea Wei MD Inpatient    10/31/2016  1:24 PM 11/23/2016  6:52 PM Full Code [id number]  Bindu Barillas PA-C Outpatient    10/31/2016  4:53 AM 10/31/2016  1:24 PM Full Code [id number]  Regino Valenzuela MD ED    9/9/2016  7:48 AM 10/31/2016  4:53 AM Full Code [id number]  Shea Xiong MD Outpatient    8/23/2016  9:05 PM 9/9/2016  7:48 AM Full Code [id number]  Adonay Fernandez MD Inpatient    7/24/2016 12:45 PM 7/25/2016  7:34 PM Full Code [id number]  Jossie Higgins MD Inpatient    1/6/2016  2:05 PM 7/24/2016 12:45 PM Full Code [id number]  Romero Zamarripa MD Outpatient    1/5/2016 12:18 AM 1/6/2016  2:05 PM Full Code [id number]  Israel Christianson MD Inpatient    11/18/2015 11:13 AM 1/5/2016 12:18 AM Full Code [id number]  Starr Champion MD Outpatient    11/15/2015 11:37 AM 11/18/2015 11:13 AM Full Code [id number]  Abhi Cruz MD Inpatient    3/9/2015 10:35 AM 11/15/2015 11:37 AM Full Code [id number]  Danyelle Pelayo MD Outpatient    7/5/2014 11:32 AM 3/9/2015 10:35 AM Full Code [id number]  Shea Phillips MD Outpatient    7/4/2014 11:49 AM 7/5/2014 11:32 AM Full Code [id number]  Starr Champion MD ED    3/14/2014  2:01 PM 7/4/2014 11:49 AM Full Code [id number]  Starr Champion MD Outpatient    3/13/2014  6:06 PM 3/14/2014  2:01 PM Full Code [id number]  Carrie Hanson RN Inpatient    7/30/2013  9:12 PM 8/1/2013  4:35 PM Full Code [id number]  Sharon Zeng RN Inpatient         Medication Review      START taking        Dose / Directions Comments    amoxicillin-clavulanate 400-57 MG/5ML suspension   Commonly known as:  AUGMENTIN   Indication:  Pneumonia caused by Inhaling a Substance Into the Lungs   Used for:  Aspiration pneumonia of both lower lobes due to gastric secretions (H)        Dose:  10 mL   Take 10 mLs by mouth 2 times daily for 4 days   Quantity:  80 mL   Refills:  0        saccharomyces boulardii 250  MG capsule   Commonly known as:  FLORASTOR   Used for:  Altered bowel function        Dose:  250 mg   1 capsule (250 mg) by Oral or Feeding Tube route 2 times daily   Quantity:  60 capsule   Refills:  0        scopolamine 72 hr patch   Commonly known as:  TRANSDERM   Used for:  Aspiration pneumonia of both lower lobes due to gastric secretions (H), Disturbance of salivary secretion        Dose:  1 patch   Start taking on:  9/22/2018   Place 1 patch onto the skin every 72 hours   Quantity:  10 patch   Refills:  0          CONTINUE these medications which may have CHANGED, or have new prescriptions. If we are uncertain of the size of tablets/capsules you have at home, strength may be listed as something that might have changed.        Dose / Directions Comments    carBAMazepine 100 MG/5ML suspension   Commonly known as:  TEGretol   This may have changed:    - how much to take  - how to take this   Used for:  Seizure disorder (H)        Dose:  125 mg   6.25 mLs (125 mg) by Per GJ tube route every 6 hours   Quantity:  600 mL   Refills:  0    Resume prior to admission dosing of 150 mg by mouth after antibiotic course is completed.         CONTINUE these medications which have NOT CHANGED        Dose / Directions Comments    acetaminophen 500 MG tablet   Commonly known as:  TYLENOL        Dose:  1000 mg   1,000 mg by Oral or FT or NG tube route every 6 hours as needed for mild pain   Refills:  0        albuterol (2.5 MG/3ML) 0.083% neb solution        Dose:  1 vial   Take 1 vial by nebulization every 4 hours as needed for shortness of breath / dyspnea or wheezing   Refills:  0        bacitracin ointment        Apply topically daily as needed for wound care To PEG site.   Refills:  0        BENEFIBER PO        Dose:  2 teaspoonful   Take 2 teaspoonful by mouth daily   Refills:  0        BRIVIACT 10 MG/ML solution   Generic drug:  Brivaracetam        Dose:  100 mg   100 mg by Oral or FT or NG tube route 2 times daily @0900  and 2100   Refills:  0        calcium carbonate 1250 (500 Ca) MG/5ML Susp suspension   Used for:  Malnutrition (H)        Dose:  1250 mg   5 mLs (1,250 mg) by Per J Tube route 3 times daily (with meals)   Quantity:  450 mL   Refills:  0        CENTRUM SILVER per tablet        Dose:  1 tablet   Take 1 tablet by mouth daily Crush and feed via j-tube   Refills:  0        * hydrocortisone 5 MG tablet   Commonly known as:  CORTEF        Dose:  10 mg   10 mg by Oral or FT or NG tube route every evening 2 tablets x 5mg=10mg   Refills:  0        * hydrocortisone 5 MG tablet   Commonly known as:  CORTEF        Dose:  15 mg   15 mg by Oral or FT or NG tube route every morning 3 tablets x 5mg=15mg   Refills:  0        levothyroxine 137 MCG tablet   Commonly known as:  SYNTHROID/LEVOTHROID        Dose:  137 mcg   137 mcg by Oral or FT or NG tube route daily   Refills:  0        melatonin 1 MG/ML Liqd liquid        Dose:  6 mg   6 mg by Jejunal Tube route nightly as needed for sleep   Refills:  0        pantoprazole 2 mg/mL Susp suspension   Commonly known as:  PROTONIX   Used for:  Gastroesophageal reflux disease without esophagitis        Dose:  20 mg   Start taking on:  9/22/2018   Take 10 mLs (20 mg) by mouth every morning   Quantity:  300 mL   Refills:  0        potassium & sodium phosphates 280-160-250 MG Packet   Commonly known as:  NEUTRA-PHOS        Dose:  1 packet   Take 1 packet by mouth 3 times daily 0900, 1500, 2100.   Refills:  0        testosterone cypionate 200 MG/ML injection   Commonly known as:  DEPOTESTOTERONE        Dose:  100 mg   Inject 100 mg into the muscle See Admin Instructions Every 2 weeks.   Refills:  0        Vitamin D (Cholecalciferol) 1000 units Tabs        Dose:  2 tablet   Take 2 tablets by mouth every morning   Refills:  0        * Notice:  This list has 2 medication(s) that are the same as other medications prescribed for you. Read the directions carefully, and ask your doctor or other care  provider to review them with you.      STOP taking     ranitidine 15 MG/ML syrup   Commonly known as:  ZANTAC                     Further instructions from your care team       Isosource 1.5 @ 100 mL/hr from 7 am to 7 pm.  H2O flushes of 200 mL q 3 hr or per MD.    Summary of Visit     Reason for your hospital stay       You were hospitalized for further evaluation and treatment of aspiration pneumonia and bacteremia.             After Care     Activity       Your activity upon discharge: activity as tolerated       Diet       Follow this diet upon discharge: NPO status, resume home tube feeds. Please increase free fluid to 200 mL every 4 hours for a total of 1200 mL/day.       Discharge Instructions       1) Follow-up with your primary care provider in the next week to discuss hospitalization   2) Resume home RN, HHA, PT, OT   3) Augmentin prescribed at discharge, complete 4 day supply   4) Scopolamine patches prescribed at discharge for secretion management, take as prescribed   5) Please reduce dose of Tegretol to 125 mg every 6 hours until antibiotic course completed, then can resume prior to admission dosing of 150 mg every 6 hours   6) Probiotic ordered at discharge per family request   7) Increase free fluid to 200 ml every 4 hours with tube feeds for total of 1200 mL/day  8) Protonix solution prescribed at discharge for reflux symptoms             Referrals     Home Care OT Referral for Hospital Discharge       OT to eval and treat    Your provider has ordered home care - occupational therapy. If you have not been contacted within 2 days of your discharge please call the department phone number listed on the top of this document.       Home Care PT Referral for Hospital Discharge       PT to eval and treat    Your provider has ordered home care - physical therapy. If you have not been contacted within 2 days of your discharge please call the department phone number listed on the top of this document.        Home Care SLP Referral for Hospital Discharge       SLP to eval and treat    Your provider has ordered home care - speech therapy. If you have not been contacted within 2 days of your discharge please call the department phone number listed on the top of this document.       Home care nursing referral       RN-- Resume current cares  Home health aide to assist with ADLs    Your provider has ordered home care nursing services. If you have not been contacted within 2 days of your discharge please call the inpatient department phone number at [phone] .             Follow-Up Appointment Instructions     Future Labs/Procedures    Follow-up and recommended labs and tests      Comments:    [facility]  [phone]    Bring photo ID and insurance card    Follow-up and recommended labs and tests      Comments:    Follow up with primary care provider, Carlso Gomez, within 7 days for hospital follow- up.  The following labs/tests are recommended: BMP.      Follow-Up Appointment Instructions     Follow-up and recommended labs and tests        [facility]  [phone]    Bring photo ID and insurance card       Follow-up and recommended labs and tests        Follow up with primary care provider, Carlos Gomez, within 7 days for hospital follow- up.  The following labs/tests are recommended: BMP.             Statement of Approval     Ordered          09/21/18 1255  I have reviewed and agree with all the recommendations and orders detailed in this document.  EFFECTIVE NOW     Approved and electronically signed by:  Lakia Beebe PA-C

## 2018-09-15 NOTE — IP AVS SNAPSHOT
[place] [address] [facility]    [address] [place] [address]    Phone:  [phone]                                       After Visit Summary   9/15/2018    Keyon Farias    MRN: [id number]           After Visit Summary Signature Page     I have received my discharge instructions, and my questions have been answered. I have discussed any challenges I see with this plan with the nurse or doctor.    ..........................................................................................................................................  Patient/Patient Representative Signature      ..........................................................................................................................................  Patient Representative Print Name and Relationship to Patient    ..................................................               ................................................  Date                                   Time    ..........................................................................................................................................  Reviewed by Signature/Title    ...................................................              ..............................................  Date                                               Time          22EPIC Rev 08/18

## 2018-09-15 NOTE — IP AVS SNAPSHOT
MRN:[id number]                      After Visit Summary   9/15/2018    Keyon Farias    MRN: [id number]           Thank you!     Thank you for choosing [facility] for your care. Our goal is always to provide you with excellent care. Hearing back from our patients is one way we can continue to improve our services. Please take a few minutes to complete the written survey that you may receive in the mail after you visit with us. Thank you!        Patient Information     Date Of Birth          1960        Designated Caregiver       Most Recent Value    Caregiver    Will someone help with your care after discharge? yes    Name of designated caregiver Savannah     Phone number of caregiver [phone]    Caregiver address [address]      About your hospital stay     You were admitted on:  September 15, 2018 You last received care in the:  [facility] [address] [facility]    You were discharged on:  September 21, 2018        Reason for your hospital stay       You were hospitalized for further evaluation and treatment of aspiration pneumonia and bacteremia.                  Who to Call     For medical emergencies, please call 911.  For non-urgent questions about your medical care, please call your primary care provider or clinic, [phone]          Attending Provider     Provider Specialty    Brooke Mcallister APRN CNP Nurse Practitioner    Robbie Miller MD Emergency Medicine    [facility], Manish Millard MD Internal Medicine    [facility], MD [facility] Internal Medicine       Primary Care Provider Office Phone # Fax #    Carlos Gomez [phone]      After Care Instructions     Activity       Your activity upon discharge: activity as tolerated            Diet       Follow this diet upon discharge: NPO status, resume home tube feeds. Please increase free fluid to 200 mL every 4 hours for a total of 1200 mL/day.            Discharge Instructions       1)  Follow-up with your primary care provider in the next week to discuss hospitalization   2) Resume home RN, HHA, PT, OT   3) Augmentin prescribed at discharge, complete 4 day supply   4) Scopolamine patches prescribed at discharge for secretion management, take as prescribed   5) Please reduce dose of Tegretol to 125 mg every 6 hours until antibiotic course completed, then can resume prior to admission dosing of 150 mg every 6 hours   6) Probiotic ordered at discharge per family request   7) Increase free fluid to 200 ml every 4 hours with tube feeds for total of 1200 mL/day  8) Protonix solution prescribed at discharge for reflux symptoms                  Follow-up Appointments     Follow-up and recommended labs and tests        Follow up with Dr Gomez on Sept 28th at 1pm  [facility]  [phone]    Bring photo ID and insurance card            Follow-up and recommended labs and tests        Follow up with primary care provider, Carlos Gomez, within 7 days for hospital follow- up.  The following labs/tests are recommended: BMP.                  Additional Services     Home Care OT Referral for Hospital Discharge       OT to [facility] and treat    Your provider has ordered home care - occupational therapy. If you have not been contacted within 2 days of your discharge please call the department phone number listed on the top of this document.            Home Care PT Referral for Hospital Discharge       PT to al and treat    Your provider has ordered home care - physical therapy. If you have not been contacted within 2 days of your discharge please call the department phone number listed on the top of this document.            Home Care SLP Referral for Hospital Discharge       SLP to al and treat    Your provider has ordered home care - speech therapy. If you have not been contacted within 2 days of your discharge please call the department phone number listed on the top of this document.             "Home care nursing referral       RN-- Resume current cares  Home health aide to assist with ADLs    Your provider has ordered home care nursing services. If you have not been contacted within 2 days of your discharge please call the inpatient department phone number at [phone] .                  Further instructions from your care team       Isosource 1.5 @ 100 mL/hr from 7 am to 7 pm.  H2O flushes of 200 mL q 3 hr or per MD.    Pending Results     Date and Time Order Name Status Description    2018 1048 Blood culture Preliminary     2018 1048 Blood culture Preliminary     2018 1340 Blood culture Preliminary             Statement of Approval     Ordered          18 1255  I have reviewed and agree with all the recommendations and orders detailed in this document.  EFFECTIVE NOW     Approved and electronically signed by:  Lakia Beebe PA-C             Admission Information     Date & Time Provider Department Dept. Phone    9/15/2018 Yanely Liriano MD [place] [address] [facility] [phone]      Your Vitals Were     Blood Pressure Pulse Temperature Respirations Weight Pulse Oximetry    100/63 (BP Location: Left arm) 87 97.8  F (36.6  C) (Axillary) 18 78 kg (171 lb 15.3 oz) 94%    BMI (Body Mass Index)                   23.32 kg/m2           [facility] Information     [employer] lets you send messages to your doctor, view your test results, renew your prescriptions, schedule appointments and more. To sign up, go to [email or web address] . Click on \"Log in\" on the left side of the screen, which will take you to the Welcome page. Then click on \"Sign up Now\" on the right side of the page.     You will be asked to enter the access code listed below, as well as some personal information. Please follow the directions to create your username and password.     Your access code is: [email or web address]  Expires: 10/6/2018  9:33 AM     Your access code will  in 90 days. " If you need help or a new code, please call your [facility] clinic or [phone].        Care EveryWhere ID     This is your Care EveryWhere ID. This could be used by other organizations to access your [facility] medical records  [id number]        Equal Access to Services     SARBJITJAYLEN TASHIA : Hadii aad ku hadkristenjess Rob, wazachda luqadaha, qaybta kaalmada adetrudy, waxgrant crispin charlottereji cabelloblanca louisdestiny contreras. So [facility] [phone].    ATENCIÓN: Si habla español, tiene a king disposición servicios gratuitos de asistencia lingüística. Llame al [phone].    We comply with applicable federal civil rights laws and [place] laws. We do not discriminate on the basis of race, color, national origin, age, disability, sex, sexual orientation, or gender identity.               Review of your medicines      START taking        Dose / Directions    amoxicillin-clavulanate 400-57 MG/5ML suspension   Commonly known as:  AUGMENTIN   Indication:  Pneumonia caused by Inhaling a Substance Into the Lungs   Used for:  Aspiration pneumonia of both lower lobes due to gastric secretions (H)        Dose:  10 mL   Take 10 mLs by mouth 2 times daily for 4 days   Quantity:  80 mL   Refills:  0       saccharomyces boulardii 250 MG capsule   Commonly known as:  FLORASTOR   Used for:  Altered bowel function        Dose:  250 mg   1 capsule (250 mg) by Oral or Feeding Tube route 2 times daily   Quantity:  60 capsule   Refills:  0       scopolamine 72 hr patch   Commonly known as:  TRANSDERM   Used for:  Aspiration pneumonia of both lower lobes due to gastric secretions (H), Disturbance of salivary secretion        Dose:  1 patch   Start taking on:  9/22/2018   Place 1 patch onto the skin every 72 hours   Quantity:  10 patch   Refills:  0         CONTINUE these medicines which may have CHANGED, or have new prescriptions. If we are uncertain of the size of tablets/capsules you have at home, strength may be listed as something that might have changed.         Dose / Directions    carBAMazepine 100 MG/5ML suspension   Commonly known as:  TEGretol   This may have changed:    - how much to take  - how to take this   Used for:  Seizure disorder (H)        Dose:  125 mg   6.25 mLs (125 mg) by Per GJ tube route every 6 hours   Quantity:  600 mL   Refills:  0         CONTINUE these medicines which have NOT CHANGED        Dose / Directions    acetaminophen 500 MG tablet   Commonly known as:  TYLENOL        Dose:  1000 mg   1,000 mg by Oral or FT or NG tube route every 6 hours as needed for mild pain   Refills:  0       albuterol (2.5 MG/3ML) 0.083% neb solution        Dose:  1 vial   Take 1 vial by nebulization every 4 hours as needed for shortness of breath / dyspnea or wheezing   Refills:  0       bacitracin ointment        Apply topically daily as needed for wound care To PEG site.   Refills:  0       BENEFIBER PO        Dose:  2 teaspoonful   Take 2 teaspoonful by mouth daily   Refills:  0       BRIVIACT 10 MG/ML solution   Generic drug:  Brivaracetam        Dose:  100 mg   100 mg by Oral or FT or NG tube route 2 times daily @0900 and 2100   Refills:  0       calcium carbonate 1250 (500 Ca) MG/5ML Susp suspension   Used for:  Malnutrition (H)        Dose:  1250 mg   5 mLs (1,250 mg) by Per J Tube route 3 times daily (with meals)   Quantity:  450 mL   Refills:  0       CENTRUM SILVER per tablet        Dose:  1 tablet   Take 1 tablet by mouth daily Crush and feed via j-tube   Refills:  0       * hydrocortisone 5 MG tablet   Commonly known as:  CORTEF        Dose:  10 mg   10 mg by Oral or FT or NG tube route every evening 2 tablets x 5mg=10mg   Refills:  0       * hydrocortisone 5 MG tablet   Commonly known as:  CORTEF        Dose:  15 mg   15 mg by Oral or FT or NG tube route every morning 3 tablets x 5mg=15mg   Refills:  0       levothyroxine 137 MCG tablet   Commonly known as:  SYNTHROID/LEVOTHROID        Dose:  137 mcg   137 mcg by Oral or FT or NG tube route daily    Refills:  0       melatonin 1 MG/ML Liqd liquid        Dose:  6 mg   6 mg by Jejunal Tube route nightly as needed for sleep   Refills:  0       pantoprazole 2 mg/mL Susp suspension   Commonly known as:  PROTONIX   Used for:  Gastroesophageal reflux disease without esophagitis        Dose:  20 mg   Start taking on:  9/22/2018   Take 10 mLs (20 mg) by mouth every morning   Quantity:  300 mL   Refills:  0       potassium & sodium phosphates 280-160-250 MG Packet   Commonly known as:  NEUTRA-PHOS        Dose:  1 packet   Take 1 packet by mouth 3 times daily 0900, 1500, 2100.   Refills:  0       testosterone cypionate 200 MG/ML injection   Commonly known as:  DEPOTESTOTERONE        Dose:  100 mg   Inject 100 mg into the muscle See Admin Instructions Every 2 weeks.   Refills:  0       Vitamin D (Cholecalciferol) 1000 units Tabs        Dose:  2 tablet   Take 2 tablets by mouth every morning   Refills:  0       * Notice:  This list has 2 medication(s) that are the same as other medications prescribed for you. Read the directions carefully, and ask your doctor or other care provider to review them with you.      STOP taking     ranitidine 15 MG/ML syrup   Commonly known as:  ZANTAC                Where to get your medicines      These medications were sent to [facility] [place], [place] - [address]  [address] [facility] [address], [facility] [address]     Phone:  [phone]     amoxicillin-clavulanate 400-57 MG/5ML suspension    pantoprazole 2 mg/mL Susp suspension    saccharomyces boulardii 250 MG capsule    scopolamine 72 hr patch         Some of these will need a paper prescription and others can be bought over the counter. Ask your nurse if you have questions.     You don't need a prescription for these medications     carBAMazepine 100 MG/5ML suspension                Protect others around you: Learn how to safely use, store and throw away your medicines at www.disposemymeds.org.        ANTIBIOTIC  INSTRUCTION     You've Been Prescribed an Antibiotic - Now What?  Your healthcare team thinks that you or your loved one might have an infection. Some infections can be treated with antibiotics, which are powerful, life-saving drugs. Like all medications, antibiotics have side effects and should only be used when necessary. There are some important things you should know about your antibiotic treatment.      Your healthcare team may run tests before you start taking an antibiotic.    Your team may take samples (e.g., from your blood, urine or other areas) to run tests to look for bacteria. These test can be important to determine if you need an antibiotic at all and, if you do, which antibiotic will work best.      Within a few days, your healthcare team might change or even stop your antibiotic.    Your team may start you on an antibiotic while they are working to find out what is making you sick.    Your team might change your antibiotic because test results show that a different antibiotic would be better to treat your infection.    In some cases, once your team has more information, they learn that you do not need an antibiotic at all. They may find out that you don't have an infection, or that the antibiotic you're taking won't work against your infection. For example, an infection caused by a virus can't be treated with antibiotics. Staying on an antibiotic when you don't need it is more likely to be harmful than helpful.      You may experience side effects from your antibiotic.    Like all medications, antibiotics have side effects. Some of these can be serious.    Let you healthcare team know if you have any known allergies when you are admitted to the hospital.    One significant side effect of nearly all antibiotics is the risk of severe and sometimes deadly diarrhea caused by Clostridium difficile (C. Difficile). This occurs when a person takes antibiotics because some good germs are destroyed.  Antibiotic use allows C. diificile to take over, putting patients at high risk for this serious infection.    As a patient or caregiver, it is important to understand your or your loved one's antibiotic treatment. It is especially important for caregivers to speak up when patients can't speak for themselves. Here are some important questions to ask your healthcare team.    What infection is this antibiotic treating and how do you know I have that infection?    What side effects might occur from this antibiotic?    How long will I need to take this antibiotic?    Is it safe to take this antibiotic with other medications or supplements (e.g., vitamins) that I am taking?     Are there any special directions I need to know about taking this antibiotic? For example, should I take it with food?    How will I be monitored to know whether my infection is responding to the antibiotic?    What tests may help to make sure the right antibiotic is prescribed for me?      Information provided by:  www.cdc.gov/getsmart  U.S. Department of Health and Human Services  Centers for disease Control and Prevention  National Center for Emerging and Zoonotic Infectious Diseases  Division of Healthcare Quality Promotion             Medication List: This is a list of all your medications and when to take them. Check marks below indicate your daily home schedule. Keep this list as a reference.      Medications           Morning Afternoon Evening Bedtime As Needed    acetaminophen 500 MG tablet   Commonly known as:  TYLENOL   1,000 mg by Oral or FT or NG tube route every 6 hours as needed for mild pain   Last time this was given:  1,000 mg on 9/17/2018  6:00 AM                                   albuterol (2.5 MG/3ML) 0.083% neb solution   Take 1 vial by nebulization every 4 hours as needed for shortness of breath / dyspnea or wheezing                                   amoxicillin-clavulanate 400-57 MG/5ML suspension   Commonly known as:   AUGMENTIN   Take 10 mLs by mouth 2 times daily for 4 days   Last time this was given:  10 mLs on 9/21/2018  8:45 AM   Next Dose Due:  Take this evening 9/21 9/22 9/21               bacitracin ointment   Apply topically daily as needed for wound care To PEG site.                                BENEFIBER PO   Take 2 teaspoonful by mouth daily   Next Dose Due:  Resume home schedule                                BRIVIACT 10 MG/ML solution   100 mg by Oral or FT or NG tube route 2 times daily @0900 and 2100   Last time this was given:  100 mg on 9/21/2018  9:45 AM   Generic drug:  Brivaracetam   Next Dose Due:  Take this evening 9/21 9/22 9/21               calcium carbonate 1250 (500 Ca) MG/5ML Susp suspension   5 mLs (1,250 mg) by Per J Tube route 3 times daily (with meals)   Next Dose Due:  Resume home schedule                                carBAMazepine 100 MG/5ML suspension   Commonly known as:  TEGretol   6.25 mLs (125 mg) by Per GJ tube route every 6 hours   Last time this was given:  125 mg on 9/21/2018 11:58 AM   Next Dose Due:  Take this evening and bedtime 9/21 9/22 9/22 9/21 9/21           CENTRUM SILVER per tablet   Take 1 tablet by mouth daily Crush and feed via j-tube   Next Dose Due:  Resume home schedule                                * hydrocortisone 5 MG tablet   Commonly known as:  CORTEF   10 mg by Oral or FT or NG tube route every evening 2 tablets x 5mg=10mg   Last time this was given:  15 mg on 9/21/2018  8:45 AM   Next Dose Due:  Take this evening 9/21 9/21               * hydrocortisone 5 MG tablet   Commonly known as:  CORTEF   15 mg by Oral or FT or NG tube route every morning 3 tablets x 5mg=15mg   Last time this was given:  15 mg on 9/21/2018  8:45 AM   Next Dose Due:  Take tomorrow AM 9/22 9/22                       levothyroxine 137 MCG tablet   Commonly known as:  SYNTHROID/LEVOTHROID    137 mcg by Oral or FT or NG tube route daily   Last time this was given:  137 mcg on 9/21/2018  8:45 AM   Next Dose Due:  Take tomorrow AM 9/22 9/22                       melatonin 1 MG/ML Liqd liquid   6 mg by Jejunal Tube route nightly as needed for sleep                                   pantoprazole 2 mg/mL Susp suspension   Commonly known as:  PROTONIX   Take 10 mLs (20 mg) by mouth every morning   Start taking on:  9/22/2018   Last time this was given:  20 mg on 9/21/2018  8:45 AM   Next Dose Due:  Take tomorrow AM 9/22 9/22                       potassium & sodium phosphates 280-160-250 MG Packet   Commonly known as:  NEUTRA-PHOS   Take 1 packet by mouth 3 times daily 0900, 1500, 2100.   Last time this was given:  1 packet on 9/21/2018  8:45 AM   Next Dose Due:  Take at 2100 tonight 9/21 9/22 9/22 9/21           saccharomyces boulardii 250 MG capsule   Commonly known as:  FLORASTOR   1 capsule (250 mg) by Oral or Feeding Tube route 2 times daily   Last time this was given:  250 mg on 9/21/2018  8:45 AM   Next Dose Due:  Take this evening 9/21 9/22 9/21               scopolamine 72 hr patch   Commonly known as:  TRANSDERM   Place 1 patch onto the skin every 72 hours   Start taking on:  9/22/2018   Last time this was given:  1 patch on 9/20/2018 12:54 PM   Next Dose Due:  Change patch 9/23 in AM            9/23                       testosterone cypionate 200 MG/ML injection   Commonly known as:  DEPOTESTOTERONE   Inject 100 mg into the muscle See Admin Instructions Every 2 weeks.   Next Dose Due:  Resume home schedule                                Vitamin D (Cholecalciferol) 1000 units Tabs   Take 2 tablets by mouth every morning   Next Dose Due:  Resume home schedule                                * Notice:  This list has 2 medication(s) that are the same as other medications prescribed for you. Read the directions carefully, and ask  your doctor or other care provider to review them with you.              More Information        Understanding Aspiration from Dysphagia  Aspiration is when something enters your airway or lungs by accident. It may be food, liquid, or some other material. This can cause serious health problems, such as pneumonia. Aspiration can happen when you have trouble swallowing normally. This is known as dysphagia.  What happens when you swallow?  When you swallow food, it passes from your mouth down into your throat (pharynx). From there, the food moves down through a long tube (esophagus) and into your stomach. This journey is made possible by a series of actions from the muscles in these areas.  The pharynx is also part of the system that brings air into your lungs. When you breathe, air enters your mouth and moves into the pharynx. The air then goes down into your main airway (trachea) and into your lungs. A flap of tissue called the epiglottis sits over the top of the trachea. This flap blocks food and drink from going down into the trachea when you swallow.  What causes aspiration?  Aspiration from dysphagia is caused when the muscles in your throat don t work normally. This lets food or drink enter the trachea when you swallow. This can happen as food goes down when you swallow. Or it can happen if food comes back up from your stomach.  When a person has dysphagia, aspiration is always a risk. You may be at risk for aspiration from dysphagia if you have any of these medical conditions:    Stroke    Severe dental problems    Conditions that lead to less saliva, such as Sjogren s syndrome    Mouth sores    Parkinson disease or other neurologic conditions    Muscular dystrophies    Blockage in the esophagus, such as a growth from cancer    History of radiation or surgery to treat throat cancer  Symptoms of aspiration from dysphagia  Some people who aspirate do not have any signs or symptoms. This is called silent  aspiration. But aspiration can cause symptoms such as:    Sense of food sticking in your throat or coming back into your mouth    Pain when swallowing    Trouble starting a swallow    Coughing or wheezing after eating    Chest discomfort or heartburn    Fever 30 minutes to an hour after eating    Too much saliva    Feeling congested after eating or drinking    Having a wet-sounding voice during or after eating or drinking    Shortness of breath or tiredness while eating    Vomiting blood    Pneumonia that comes back again and again  Symptoms can happen right after eating. Or they may happen over time. You may not have all of these symptoms. They may depend on how often and how much food or drink you aspirate.  Diagnosing aspiration from dysphagia  You will need to be checked for aspiration from dysphagia if you have symptoms. You may also need to be checked if you have had a stroke or other health problem that can cause trouble swallowing. If your healthcare provider thinks you may be aspirating, you may be told to not eat or drink until you are tested.  Your healthcare provider will ask about your medical history and symptoms. This may be done by a speech-language pathologist (SLP). The SLP will try to find out if you have problems with the lower or upper part of your swallowing muscles. The SLP may ask about what foods or drink cause problems, and when your symptoms occur.  You may have a physical exam. This may include an exam of your teeth, lips, jaws, tongue, and cheeks. You may be asked to move these areas in certain ways and make certain sounds. Your SLP may also test how you swallow different types of liquids and solids.  You may also need 1 or more tests. These can help to find the cause of your dysphagia. Tests are often very helpful in showing cases of silent aspiration. The test may include:    Modified barium swallow test (MBS). This is done to show if material is going into your lungs.    Fiberoptic  endoscopic evaluation of swallowing (FEES). This test can also show if material is going into your lungs    Pharyngeal manometry. This test checks the pressure inside your esophagus  Date Last Reviewed: 3/1/2017    6769-1238 [employer]. [address] [place] [address], [place] [address]. All rights reserved. This information is not intended as a substitute for professional medical care. Always follow your healthcare professional's instructions.

## 2018-09-16 LAB
ANION GAP SERPL CALCULATED.3IONS-SCNC: 6 MMOL/L (ref 3–14)
BACTERIA SPEC CULT: NO GROWTH
BUN SERPL-MCNC: 11 MG/DL (ref 7–30)
CALCIUM SERPL-MCNC: 8 MG/DL (ref 8.5–10.1)
CHLORIDE SERPL-SCNC: 101 MMOL/L (ref 94–109)
CO2 SERPL-SCNC: 28 MMOL/L (ref 20–32)
CREAT SERPL-MCNC: 0.85 MG/DL (ref 0.66–1.25)
ERYTHROCYTE [DISTWIDTH] IN BLOOD BY AUTOMATED COUNT: 13.1 % (ref 10–15)
GFR SERPL CREATININE-BSD FRML MDRD: >90 ML/MIN/1.7M2
GLUCOSE BLDC GLUCOMTR-MCNC: 149 MG/DL (ref 70–99)
GLUCOSE SERPL-MCNC: 142 MG/DL (ref 70–99)
HCT VFR BLD AUTO: 32.5 % (ref 40–53)
HGB BLD-MCNC: 11.1 G/DL (ref 13.3–17.7)
LACTATE BLD-SCNC: 1.5 MMOL/L (ref 0.7–2)
LACTATE BLD-SCNC: 1.8 MMOL/L (ref 0.7–2)
LACTATE BLD-SCNC: 2.8 MMOL/L (ref 0.7–2)
LACTATE BLD-SCNC: 3.8 MMOL/L (ref 0.7–2)
LACTATE BLD-SCNC: 4.8 MMOL/L (ref 0.7–2)
Lab: NORMAL
MCH RBC QN AUTO: 29.8 PG (ref 26.5–33)
MCHC RBC AUTO-ENTMCNC: 34.2 G/DL (ref 31.5–36.5)
MCV RBC AUTO: 87 FL (ref 78–100)
PLATELET # BLD AUTO: 154 10E9/L (ref 150–450)
POTASSIUM SERPL-SCNC: 5.1 MMOL/L (ref 3.4–5.3)
RBC # BLD AUTO: 3.72 10E12/L (ref 4.4–5.9)
SODIUM SERPL-SCNC: 135 MMOL/L (ref 133–144)
SPECIMEN SOURCE: NORMAL
WBC # BLD AUTO: 18.8 10E9/L (ref 4–11)

## 2018-09-16 PROCEDURE — 36415 COLL VENOUS BLD VENIPUNCTURE: CPT | Performed by: INTERNAL MEDICINE

## 2018-09-16 PROCEDURE — 00000146 ZZHCL STATISTIC GLUCOSE BY METER IP

## 2018-09-16 PROCEDURE — 83605 ASSAY OF LACTIC ACID: CPT | Performed by: INTERNAL MEDICINE

## 2018-09-16 PROCEDURE — C9113 INJ PANTOPRAZOLE SODIUM, VIA: HCPCS | Performed by: INTERNAL MEDICINE

## 2018-09-16 PROCEDURE — 85027 COMPLETE CBC AUTOMATED: CPT | Performed by: INTERNAL MEDICINE

## 2018-09-16 PROCEDURE — 80048 BASIC METABOLIC PNL TOTAL CA: CPT | Performed by: INTERNAL MEDICINE

## 2018-09-16 PROCEDURE — 96375 TX/PRO/DX INJ NEW DRUG ADDON: CPT

## 2018-09-16 PROCEDURE — 12000000 ZZH R&B MED SURG/OB

## 2018-09-16 PROCEDURE — 25000128 H RX IP 250 OP 636: Performed by: EMERGENCY MEDICINE

## 2018-09-16 PROCEDURE — P9047 ALBUMIN (HUMAN), 25%, 50ML: HCPCS | Performed by: EMERGENCY MEDICINE

## 2018-09-16 PROCEDURE — A9270 NON-COVERED ITEM OR SERVICE: HCPCS | Mod: GY | Performed by: INTERNAL MEDICINE

## 2018-09-16 PROCEDURE — 25000132 ZZH RX MED GY IP 250 OP 250 PS 637: Mod: GY | Performed by: INTERNAL MEDICINE

## 2018-09-16 PROCEDURE — 99223 1ST HOSP IP/OBS HIGH 75: CPT | Mod: AI | Performed by: INTERNAL MEDICINE

## 2018-09-16 PROCEDURE — 25000128 H RX IP 250 OP 636: Performed by: INTERNAL MEDICINE

## 2018-09-16 RX ORDER — ALBUTEROL SULFATE 0.83 MG/ML
2.5 SOLUTION RESPIRATORY (INHALATION) EVERY 4 HOURS PRN
Status: DISCONTINUED | OUTPATIENT
Start: 2018-09-16 | End: 2018-09-21 | Stop reason: HOSPADM

## 2018-09-16 RX ORDER — ONDANSETRON 4 MG/1
4 TABLET, ORALLY DISINTEGRATING ORAL EVERY 6 HOURS PRN
Status: DISCONTINUED | OUTPATIENT
Start: 2018-09-16 | End: 2018-09-21 | Stop reason: HOSPADM

## 2018-09-16 RX ORDER — SODIUM CHLORIDE, SODIUM LACTATE, POTASSIUM CHLORIDE, CALCIUM CHLORIDE 600; 310; 30; 20 MG/100ML; MG/100ML; MG/100ML; MG/100ML
INJECTION, SOLUTION INTRAVENOUS CONTINUOUS
Status: DISCONTINUED | OUTPATIENT
Start: 2018-09-16 | End: 2018-09-18

## 2018-09-16 RX ORDER — VANCOMYCIN HYDROCHLORIDE 1 G/200ML
1000 INJECTION, SOLUTION INTRAVENOUS EVERY 8 HOURS
Status: DISCONTINUED | OUTPATIENT
Start: 2018-09-16 | End: 2018-09-18

## 2018-09-16 RX ORDER — NITROGLYCERIN 0.4 MG/1
0.4 TABLET SUBLINGUAL EVERY 5 MIN PRN
Status: DISCONTINUED | OUTPATIENT
Start: 2018-09-16 | End: 2018-09-21 | Stop reason: HOSPADM

## 2018-09-16 RX ORDER — CARBAMAZEPINE 100 MG/5ML
150 SUSPENSION ORAL EVERY 6 HOURS SCHEDULED
Status: DISCONTINUED | OUTPATIENT
Start: 2018-09-16 | End: 2018-09-18

## 2018-09-16 RX ORDER — ONDANSETRON 2 MG/ML
4 INJECTION INTRAMUSCULAR; INTRAVENOUS EVERY 6 HOURS PRN
Status: DISCONTINUED | OUTPATIENT
Start: 2018-09-16 | End: 2018-09-16

## 2018-09-16 RX ORDER — PROCHLORPERAZINE MALEATE 10 MG
10 TABLET ORAL EVERY 6 HOURS PRN
Status: DISCONTINUED | OUTPATIENT
Start: 2018-09-16 | End: 2018-09-21 | Stop reason: HOSPADM

## 2018-09-16 RX ORDER — LEVOFLOXACIN 5 MG/ML
750 INJECTION, SOLUTION INTRAVENOUS EVERY 24 HOURS
Status: DISCONTINUED | OUTPATIENT
Start: 2018-09-16 | End: 2018-09-17

## 2018-09-16 RX ORDER — LANOLIN ALCOHOL/MO/W.PET/CERES
6 CREAM (GRAM) TOPICAL
Status: DISCONTINUED | OUTPATIENT
Start: 2018-09-16 | End: 2018-09-21 | Stop reason: HOSPADM

## 2018-09-16 RX ORDER — NALOXONE HYDROCHLORIDE 0.4 MG/ML
.1-.4 INJECTION, SOLUTION INTRAMUSCULAR; INTRAVENOUS; SUBCUTANEOUS
Status: DISCONTINUED | OUTPATIENT
Start: 2018-09-16 | End: 2018-09-21 | Stop reason: HOSPADM

## 2018-09-16 RX ORDER — PIPERACILLIN SODIUM, TAZOBACTAM SODIUM 3; .375 G/15ML; G/15ML
3.38 INJECTION, POWDER, LYOPHILIZED, FOR SOLUTION INTRAVENOUS EVERY 6 HOURS
Status: DISCONTINUED | OUTPATIENT
Start: 2018-09-16 | End: 2018-09-16

## 2018-09-16 RX ORDER — ALBUMIN (HUMAN) 12.5 G/50ML
12.5 SOLUTION INTRAVENOUS ONCE
Status: COMPLETED | OUTPATIENT
Start: 2018-09-16 | End: 2018-09-16

## 2018-09-16 RX ORDER — ONDANSETRON 4 MG/1
4 TABLET, ORALLY DISINTEGRATING ORAL EVERY 6 HOURS PRN
Status: DISCONTINUED | OUTPATIENT
Start: 2018-09-16 | End: 2018-09-16

## 2018-09-16 RX ORDER — LIDOCAINE 40 MG/G
CREAM TOPICAL
Status: DISCONTINUED | OUTPATIENT
Start: 2018-09-16 | End: 2018-09-21 | Stop reason: HOSPADM

## 2018-09-16 RX ORDER — PIPERACILLIN SODIUM, TAZOBACTAM SODIUM 4; .5 G/20ML; G/20ML
4.5 INJECTION, POWDER, LYOPHILIZED, FOR SOLUTION INTRAVENOUS EVERY 6 HOURS
Status: DISCONTINUED | OUTPATIENT
Start: 2018-09-16 | End: 2018-09-20

## 2018-09-16 RX ORDER — PROCHLORPERAZINE 25 MG
25 SUPPOSITORY, RECTAL RECTAL EVERY 12 HOURS PRN
Status: DISCONTINUED | OUTPATIENT
Start: 2018-09-16 | End: 2018-09-21 | Stop reason: HOSPADM

## 2018-09-16 RX ORDER — ONDANSETRON 2 MG/ML
4 INJECTION INTRAMUSCULAR; INTRAVENOUS EVERY 6 HOURS PRN
Status: DISCONTINUED | OUTPATIENT
Start: 2018-09-16 | End: 2018-09-21 | Stop reason: HOSPADM

## 2018-09-16 RX ORDER — NALOXONE HYDROCHLORIDE 0.4 MG/ML
.1-.4 INJECTION, SOLUTION INTRAMUSCULAR; INTRAVENOUS; SUBCUTANEOUS
Status: DISCONTINUED | OUTPATIENT
Start: 2018-09-16 | End: 2018-09-16

## 2018-09-16 RX ORDER — LEVOTHYROXINE SODIUM 137 UG/1
137 TABLET ORAL DAILY
Status: DISCONTINUED | OUTPATIENT
Start: 2018-09-16 | End: 2018-09-21 | Stop reason: HOSPADM

## 2018-09-16 RX ORDER — ALBUMIN (HUMAN) 12.5 G/50ML
12.5 SOLUTION INTRAVENOUS ONCE
Status: DISCONTINUED | OUTPATIENT
Start: 2018-09-16 | End: 2018-09-16

## 2018-09-16 RX ORDER — ACETAMINOPHEN 500 MG
1000 TABLET ORAL EVERY 6 HOURS PRN
Status: DISCONTINUED | OUTPATIENT
Start: 2018-09-16 | End: 2018-09-21 | Stop reason: HOSPADM

## 2018-09-16 RX ADMIN — SODIUM CHLORIDE, POTASSIUM CHLORIDE, SODIUM LACTATE AND CALCIUM CHLORIDE: 600; 310; 30; 20 INJECTION, SOLUTION INTRAVENOUS at 04:28

## 2018-09-16 RX ADMIN — HYDROCORTISONE SODIUM SUCCINATE 50 MG: 100 INJECTION, POWDER, FOR SOLUTION INTRAMUSCULAR; INTRAVENOUS at 18:42

## 2018-09-16 RX ADMIN — PANTOPRAZOLE SODIUM 40 MG: 40 INJECTION, POWDER, FOR SOLUTION INTRAVENOUS at 08:18

## 2018-09-16 RX ADMIN — HYDROCORTISONE SODIUM SUCCINATE 50 MG: 100 INJECTION, POWDER, FOR SOLUTION INTRAMUSCULAR; INTRAVENOUS at 08:18

## 2018-09-16 RX ADMIN — SODIUM CHLORIDE, POTASSIUM CHLORIDE, SODIUM LACTATE AND CALCIUM CHLORIDE: 600; 310; 30; 20 INJECTION, SOLUTION INTRAVENOUS at 11:03

## 2018-09-16 RX ADMIN — SODIUM CHLORIDE, POTASSIUM CHLORIDE, SODIUM LACTATE AND CALCIUM CHLORIDE 1000 ML: 600; 310; 30; 20 INJECTION, SOLUTION INTRAVENOUS at 01:32

## 2018-09-16 RX ADMIN — POTASSIUM & SODIUM PHOSPHATES POWDER PACK 280-160-250 MG 1 PACKET: 280-160-250 PACK at 21:13

## 2018-09-16 RX ADMIN — CARBAMAZEPINE 150 MG: 100 SUSPENSION ORAL at 06:04

## 2018-09-16 RX ADMIN — VANCOMYCIN HYDROCHLORIDE 1000 MG: 1 INJECTION, SOLUTION INTRAVENOUS at 11:03

## 2018-09-16 RX ADMIN — BRIVARACETAM 100 MG: 10 SOLUTION ORAL at 21:13

## 2018-09-16 RX ADMIN — SODIUM CHLORIDE, POTASSIUM CHLORIDE, SODIUM LACTATE AND CALCIUM CHLORIDE 500 ML: 600; 310; 30; 20 INJECTION, SOLUTION INTRAVENOUS at 08:23

## 2018-09-16 RX ADMIN — PIPERACILLIN SODIUM,TAZOBACTAM SODIUM 4.5 G: 4; .5 INJECTION, POWDER, FOR SOLUTION INTRAVENOUS at 12:47

## 2018-09-16 RX ADMIN — ALBUMIN HUMAN 12.5 G: 0.25 SOLUTION INTRAVENOUS at 01:22

## 2018-09-16 RX ADMIN — LEVOTHYROXINE SODIUM 137 MCG: 137 TABLET ORAL at 21:12

## 2018-09-16 RX ADMIN — CARBAMAZEPINE 150 MG: 100 SUSPENSION ORAL at 18:41

## 2018-09-16 RX ADMIN — PIPERACILLIN SODIUM,TAZOBACTAM SODIUM 3.38 G: 3; .375 INJECTION, POWDER, FOR SOLUTION INTRAVENOUS at 06:04

## 2018-09-16 RX ADMIN — LEVOFLOXACIN 750 MG: 5 INJECTION, SOLUTION INTRAVENOUS at 09:30

## 2018-09-16 RX ADMIN — VANCOMYCIN HYDROCHLORIDE 1000 MG: 1 INJECTION, SOLUTION INTRAVENOUS at 21:12

## 2018-09-16 RX ADMIN — CARBAMAZEPINE 150 MG: 100 SUSPENSION ORAL at 12:31

## 2018-09-16 RX ADMIN — SODIUM CHLORIDE, POTASSIUM CHLORIDE, SODIUM LACTATE AND CALCIUM CHLORIDE: 600; 310; 30; 20 INJECTION, SOLUTION INTRAVENOUS at 21:13

## 2018-09-16 RX ADMIN — PIPERACILLIN SODIUM,TAZOBACTAM SODIUM 4.5 G: 4; .5 INJECTION, POWDER, FOR SOLUTION INTRAVENOUS at 18:42

## 2018-09-16 ASSESSMENT — ACTIVITIES OF DAILY LIVING (ADL)
ADLS_ACUITY_SCORE: 40
ADLS_ACUITY_SCORE: 44
ADLS_ACUITY_SCORE: 40

## 2018-09-16 NOTE — ED NOTES
Pt alert indicating needs. Provided Hygiene cares. Pt incontinent of scant amount of light brown soft stool

## 2018-09-16 NOTE — ED NOTES
NP Doten aware of B/P 86/52 bedsied. Pt has IV LR infusing with antibiotic. Re-check 96/56 continue to monitor

## 2018-09-16 NOTE — ED PROVIDER NOTES
The patient was signed out to me awaiting bed placement in [facility] [place] with a diagnosis of septic shock.  Blood pressures had been adequate after fluid bolus and his lactate was improving.    While awaiting bed placement his blood pressures dropped.  Additional fluids did not improve pressures.  I discussed with the patient's mother is his medical decision maker central line placement.  It is her strong preference not to have a central line.  I reviewed the patient's records and with previous admissions his blood pressure had rebounded with hydration and albumin.  He ordered a dose of albumin.  Pressures initially rebounded but after about an hour and began trending downward again.  At this point the mother was agreeable to central line placement.    The patient was moved from his room to stabilization room in preparation for the line.  When he was awakened his pressures improved to the 90s-100 systolic with an adequate MAP.  He is mentating at baseline.  He is observed to be making urine.  His heart rate has improved from the 130s to the mid 80s.  He had several good blood pressures in a row, and his mother and I again discussed central line.  At this point she hopes to hold off.  The patient will go to ICU for close monitoring.  I discussed this with the intensivist and he will consult as needed.    DR Motta from the hospitalist service agreed to accept for admission.     Robbie Miller MD  09/16/18 1136

## 2018-09-16 NOTE — ED NOTES
DATE:  9/15/2018   TIME OF RECEIPT FROM LAB:  2301  LAB TEST:  Lactic acid  LAB VALUE:  4.5  RESULTS GIVEN WITH READ-BACK TO (PROVIDER):  JASON Mcallister  TIME LAB VALUE REPORTED TO PROVIDER:   0617

## 2018-09-16 NOTE — ED NOTES
[facility]  ED Nurse Handoff Report    ED Chief complaint: Fever (Mother states around 5:45 pt was shaking, saw Axillary temp 99.5, states concerned abou possible seizure upon rooming. 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  Yes  If yes, what color did the patient score?  White    Patient Report: Initial Complaint: pt had fever with rigors mother reported axillary temp 99.7, pt rectal temp 104.2 pt is wheelchair bound with home health staff. Pt has skin breakdown on coccyx with excoriated perineum and scrotum. Pt has wet weak cough. While in ED pt had large brown liquid emesis noted decreased SpO2 of 88%, placed on NC 5 LPM, suctioned mouth and provided Hygiene cares. Since pt has been titrated down to 2 LPm O2 per NC with sats 96%patient more alert to self and situation.     Tests Performed:labs chest X ray IV and G-tube medications  Abnormal Results: Sodium 132, WBC 12, Hg 12.8,   Treatments provided: IV LR bous, IV antibiotics, G-tube Tylenol and Home medications as noted in ED  note    Family Comments: mother bedside    OBS brochure/video discussed/provided to patient: No    ED Medications:   Medications   lactated ringers BOLUS 1,000 mL (0 mLs Intravenous Stopped 9/15/18 2056)     Followed by   lactated ringers infusion (not administered)   lactated ringers infusion ( Intravenous New Bag 9/15/18 2158)   azithromycin (ZITHROMAX) 500 mg in sodium chloride 0.9 % 250 mL intermittent infusion (500 mg Intravenous New Bag 9/15/18 2159)   lidocaine 2 % (URO-JET) jelly 10 mL (not administered)   ondansetron (ZOFRAN) injection 4 mg (4 mg Intravenous Given 9/15/18 2023)   acetaminophen (TYLENOL) solution 1,000 mg (1,000 mg Oral Given 9/15/18 2046)   lactated ringers BOLUS 2,169 mL (0 mLs Intravenous Stopped 9/15/18 2155)   piperacillin-tazobactam (ZOSYN) 4.5 g vial to attach to  mL bag (0 g Intravenous Stopped 9/15/18 2155)       Drips infusing?:  Yes    For the majority of the shift this patient was Green.   Interventions performed were none.    Severe Sepsis OR Septic Shock Diagnosis Present:   Yes    Per the ED Provider, Time Zero for severe sepsis or septic shock is:  1916  3 Hour Severe Sepsis Bundle Completion:  1. Initial Lactic Acid Result:   Recent Labs   Lab Test  09/15/18   2010  09/02/18   0218  09/01/18   2330   LACT  5.0*  1.8  2.6*     2. Blood Cultures before Antibiotics: Yes  3. Broad Spectrum Antibiotics Administered:     Anti-infectives (Future)    Start     Dose/Rate Route Frequency Ordered Stop    09/15/18 2033  azithromycin (ZITHROMAX) 500 mg in sodium chloride 0.9 % 250 mL intermittent infusion      500 mg  over 1 Hours Intravenous ONCE 09/15/18 2032          4. 2169 ml of IV fluids have been given so far      6 Hour Severe Sepsis Bundle Completion:    1. Repeat Lactic Acid Level: awaiting Lab draw at this time   Last result   Lab Results   Component Value Date    LACT 5.0 (HH) 09/15/2018     2. Patient currently on Vasopressors =  No      ED NURSE PHONE NUMBER: *[id number]
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
## 2018-09-16 NOTE — PLAN OF CARE
Problem: Patient Care Overview  Goal: Plan of Care/Patient Progress Review  Outcome: No Change  Pt arrived from ED at 0345. VSS. Denies pain. Tele shows SR. Pt arouses to voice; non-verbal at baseline. LR infusing at 125 mL/hr. No family present this shift. Will continue to monitor.

## 2018-09-16 NOTE — ED NOTES
NP Ary yang and pt given home (9 PM) scheduled liquid medications through G tube. Pt received Briviact 100 mg, Calcium 1250 mg, Zantac, Melatonin 6 mg, and Neutra-phos 280/160/250 mg at this time.

## 2018-09-16 NOTE — PHARMACY-VANCOMYCIN DOSING SERVICE
Pharmacy Vancomycin Initial Note  Date of Service 2018  Patient's  1960  58 year old, male    Indication: Sepsis    Current estimated CrCl = Estimated Creatinine Clearance: 104.6 mL/min (based on Cr of 0.85).    Creatinine for last 3 days  9/15/2018:  8:55 PM Creatinine 0.76 mg/dL  2018:  5:10 AM Creatinine 0.85 mg/dL    Recent Vancomycin Level(s) for last 3 days  No results found for requested labs within last 72 hours.      Vancomycin IV Administrations (past 72 hours)      No vancomycin orders with administrations in past 72 hours.                Nephrotoxins and other renal medications (Future)    Start     Dose/Rate Route Frequency Ordered Stop    18 1200  piperacillin-tazobactam (ZOSYN) 4.5 g vial to attach to  mL bag     Comments:  Pharmacy can adjust dose based on renal function.    4.5 g  over 30 Minutes Intravenous EVERY 6 HOURS 18 0804      18 1000  vancomycin (VANCOCIN) 1000 mg in dextrose 5% 200 mL PREMIX      1,000 mg  200 mL/hr over 1 Hours Intravenous EVERY 8 HOURS 18 0804            Contrast Orders - past 72 hours     None                Plan:  1.  Start vancomycin  1000 mg IV q8h.   2.  Goal Trough Level: 15-20 mg/L   3.  Pharmacy will check trough levels as appropriate in 1-3 Days.    4. Serum creatinine levels will be ordered a minimum of twice weekly.    5. Sartell method utilized to dose vancomycin therapy: Method 1    Autumn Pickering

## 2018-09-16 NOTE — PHARMACY-ADMISSION MEDICATION HISTORY
Admission medication history interview status for the 9/15/2018  admission is complete. See EPIC admission navigator for prior to admission medications     Medication history source reliability:Good    Actions taken by pharmacist (provider contacted, etc):  PTA list verified with home nurse     Additional medication history information not noted on PTA med list :  -Patient has a feeding tube. All the medication were either given as crushed form or liquid.   -Mother reported that patient was not able to take protonix suspension due to insurance issue. They switched to Zantac solution, but mother reported Zantac was not effective to control heartburn.   -Levothyroxine dose was 125mcg-> 137mcg once daily  -Mother had a question whether Keyon still need to take Benefiber every day?      Medication reconciliation/reorder completed by provider prior to medication history? No    Time spent in this activity:  20 minutes    Prior to Admission medications    Medication Sig Last Dose Taking? Auth Provider   acetaminophen (TYLENOL) 500 MG tablet 1,000 mg by Oral or FT or NG tube route every 6 hours as needed for mild pain 9/15/2018 at 2100 Yes Unknown, Entered By History   bacitracin ointment Apply topically daily as needed for wound care To PEG site. 9/15/2018 at PRN Yes Unknown, Entered By History   Brivaracetam (BRIVIACT) 10 MG/ML solution 100 mg by Oral or FT or NG tube route 2 times daily @0900 and 2100 9/15/2018 at 2100 Yes Reported, Patient   calcium carbonate 1250 (500 CA) MG/5ML SUSP suspension 5 mLs (1,250 mg) by Per J Tube route 3 times daily (with meals) 9/15/2018 at 2100 Yes Mariana Venegas MD   carBAMazepine (TEGRETOL) 100 MG/5ML suspension 150 mg by Oral or FT or NG tube route every 6 hours  9/15/2018 at 1800 Yes Unknown, Entered By History   hydrocortisone (CORTEF) 5 MG tablet 10 mg by Oral or FT or NG tube route every evening 2 tablets x 5mg=10mg 9/15/2018 at 1500 Yes Unknown, Entered By History    hydrocortisone (CORTEF) 5 MG tablet 15 mg by Oral or FT or NG tube route every morning 3 tablets x 5mg=15mg 9/15/2018 at 0900 Yes Unknown, Entered By History   levothyroxine (SYNTHROID/LEVOTHROID) 137 MCG tablet 137 mcg by Oral or FT or NG tube route daily 9/15/2018 at 0500 Yes Unknown, Entered By History   melatonin (MELATONIN) 1 MG/ML LIQD liquid 6 mg by Jejunal Tube route nightly as needed for sleep 9/15/2018 at 2100 Yes Unknown, Entered By History   Multiple Vitamins-Minerals (CENTRUM SILVER) per tablet Take 1 tablet by mouth daily Crush and feed via j-tube 9/15/2018 Yes Unknown, Entered By History   potassium & sodium phosphates (NEUTRA-PHOS) 280-160-250 MG Packet Take 1 packet by mouth 3 times daily 0900, 1500, 2100.  9/15/2018 at 2100 Yes Unknown, Entered By History   ranitidine (ZANTAC) 15 MG/ML syrup Take 10 mLs by mouth 2 times daily @@ 0900 and 2100 9/15/2018 at 2100 Yes Unknown, Entered By History   testosterone cypionate (DEPOTESTOTERONE CYPIONATE) 200 MG/ML injection Inject 100 mg into the muscle See Admin Instructions Every 2 weeks.  Yes Unknown, Entered By History   Vitamin D, Cholecalciferol, 1000 units TABS Take 2 tablets by mouth every morning 9/15/2018 at AM Yes Unknown, Entered By History   Wheat Dextrin (BENEFIBER PO) Take 2 teaspoonful by mouth daily  9/15/2018 at AM Yes Unknown, Entered By History   albuterol (2.5 MG/3ML) 0.083% neb solution Take 1 vial by nebulization every 4 hours as needed for shortness of breath / dyspnea or wheezing PRN  Unknown, Entered By History   pantoprazole (PROTONIX) SUSP suspension Take 20 mg by mouth every morning    Unknown, Entered By History     Kiarra Arango, Pharm.D IV Student
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
## 2018-09-17 LAB
ANION GAP SERPL CALCULATED.3IONS-SCNC: 7 MMOL/L (ref 3–14)
BUN SERPL-MCNC: 10 MG/DL (ref 7–30)
CALCIUM SERPL-MCNC: 8.2 MG/DL (ref 8.5–10.1)
CHLORIDE SERPL-SCNC: 107 MMOL/L (ref 94–109)
CO2 SERPL-SCNC: 28 MMOL/L (ref 20–32)
CREAT SERPL-MCNC: 0.87 MG/DL (ref 0.66–1.25)
ERYTHROCYTE [DISTWIDTH] IN BLOOD BY AUTOMATED COUNT: 13.6 % (ref 10–15)
GFR SERPL CREATININE-BSD FRML MDRD: >90 ML/MIN/1.7M2
GLUCOSE SERPL-MCNC: 121 MG/DL (ref 70–99)
HCT VFR BLD AUTO: 29.2 % (ref 40–53)
HGB BLD-MCNC: 9.7 G/DL (ref 13.3–17.7)
MCH RBC QN AUTO: 29.6 PG (ref 26.5–33)
MCHC RBC AUTO-ENTMCNC: 33.2 G/DL (ref 31.5–36.5)
MCV RBC AUTO: 89 FL (ref 78–100)
PLATELET # BLD AUTO: 133 10E9/L (ref 150–450)
POTASSIUM SERPL-SCNC: 3.5 MMOL/L (ref 3.4–5.3)
RBC # BLD AUTO: 3.28 10E12/L (ref 4.4–5.9)
SODIUM SERPL-SCNC: 142 MMOL/L (ref 133–144)
VANCOMYCIN SERPL-MCNC: 20.3 MG/L
VANCOMYCIN SERPL-MCNC: NORMAL MG/L
WBC # BLD AUTO: 10.1 10E9/L (ref 4–11)

## 2018-09-17 PROCEDURE — 87040 BLOOD CULTURE FOR BACTERIA: CPT | Performed by: PHYSICIAN ASSISTANT

## 2018-09-17 PROCEDURE — A9270 NON-COVERED ITEM OR SERVICE: HCPCS | Mod: GY | Performed by: PHYSICIAN ASSISTANT

## 2018-09-17 PROCEDURE — 25000128 H RX IP 250 OP 636: Performed by: INTERNAL MEDICINE

## 2018-09-17 PROCEDURE — 36415 COLL VENOUS BLD VENIPUNCTURE: CPT | Performed by: PHYSICIAN ASSISTANT

## 2018-09-17 PROCEDURE — 99233 SBSQ HOSP IP/OBS HIGH 50: CPT | Performed by: PHYSICIAN ASSISTANT

## 2018-09-17 PROCEDURE — 27210429 ZZH NUTRITION PRODUCT INTERMEDIATE LITER

## 2018-09-17 PROCEDURE — 25000132 ZZH RX MED GY IP 250 OP 250 PS 637: Mod: GY | Performed by: INTERNAL MEDICINE

## 2018-09-17 PROCEDURE — 25000132 ZZH RX MED GY IP 250 OP 250 PS 637: Mod: GY | Performed by: PHYSICIAN ASSISTANT

## 2018-09-17 PROCEDURE — 80048 BASIC METABOLIC PNL TOTAL CA: CPT | Performed by: INTERNAL MEDICINE

## 2018-09-17 PROCEDURE — 80202 ASSAY OF VANCOMYCIN: CPT | Performed by: INTERNAL MEDICINE

## 2018-09-17 PROCEDURE — 12000000 ZZH R&B MED SURG/OB

## 2018-09-17 PROCEDURE — A9270 NON-COVERED ITEM OR SERVICE: HCPCS | Mod: GY | Performed by: INTERNAL MEDICINE

## 2018-09-17 PROCEDURE — 85027 COMPLETE CBC AUTOMATED: CPT | Performed by: INTERNAL MEDICINE

## 2018-09-17 PROCEDURE — 36415 COLL VENOUS BLD VENIPUNCTURE: CPT | Performed by: INTERNAL MEDICINE

## 2018-09-17 PROCEDURE — 25000128 H RX IP 250 OP 636: Performed by: PHYSICIAN ASSISTANT

## 2018-09-17 RX ORDER — HYDROCORTISONE 10 MG/1
10 TABLET ORAL EVERY EVENING
Status: DISCONTINUED | OUTPATIENT
Start: 2018-09-18 | End: 2018-09-21 | Stop reason: HOSPADM

## 2018-09-17 RX ORDER — ATROPINE SULFATE 10 MG/ML
2-4 SOLUTION/ DROPS OPHTHALMIC
Status: DISCONTINUED | OUTPATIENT
Start: 2018-09-17 | End: 2018-09-21 | Stop reason: HOSPADM

## 2018-09-17 RX ORDER — SACCHAROMYCES BOULARDII 250 MG
250 CAPSULE ORAL 2 TIMES DAILY
Status: DISCONTINUED | OUTPATIENT
Start: 2018-09-17 | End: 2018-09-21 | Stop reason: HOSPADM

## 2018-09-17 RX ADMIN — SODIUM CHLORIDE, POTASSIUM CHLORIDE, SODIUM LACTATE AND CALCIUM CHLORIDE: 600; 310; 30; 20 INJECTION, SOLUTION INTRAVENOUS at 13:13

## 2018-09-17 RX ADMIN — HYDROCORTISONE SODIUM SUCCINATE 50 MG: 100 INJECTION, POWDER, FOR SOLUTION INTRAMUSCULAR; INTRAVENOUS at 09:13

## 2018-09-17 RX ADMIN — CARBAMAZEPINE 150 MG: 100 SUSPENSION ORAL at 18:30

## 2018-09-17 RX ADMIN — BRIVARACETAM 100 MG: 10 SOLUTION ORAL at 09:13

## 2018-09-17 RX ADMIN — VANCOMYCIN HYDROCHLORIDE 1000 MG: 1 INJECTION, SOLUTION INTRAVENOUS at 05:41

## 2018-09-17 RX ADMIN — VANCOMYCIN HYDROCHLORIDE 1000 MG: 1 INJECTION, SOLUTION INTRAVENOUS at 13:12

## 2018-09-17 RX ADMIN — SODIUM CHLORIDE, POTASSIUM CHLORIDE, SODIUM LACTATE AND CALCIUM CHLORIDE: 600; 310; 30; 20 INJECTION, SOLUTION INTRAVENOUS at 20:37

## 2018-09-17 RX ADMIN — POTASSIUM & SODIUM PHOSPHATES POWDER PACK 280-160-250 MG 1 PACKET: 280-160-250 PACK at 22:03

## 2018-09-17 RX ADMIN — LEVOFLOXACIN 750 MG: 5 INJECTION, SOLUTION INTRAVENOUS at 09:58

## 2018-09-17 RX ADMIN — POTASSIUM & SODIUM PHOSPHATES POWDER PACK 280-160-250 MG 1 PACKET: 280-160-250 PACK at 09:13

## 2018-09-17 RX ADMIN — Medication 250 MG: at 20:37

## 2018-09-17 RX ADMIN — CARBAMAZEPINE 150 MG: 100 SUSPENSION ORAL at 12:17

## 2018-09-17 RX ADMIN — PIPERACILLIN SODIUM,TAZOBACTAM SODIUM 4.5 G: 4; .5 INJECTION, POWDER, FOR SOLUTION INTRAVENOUS at 09:06

## 2018-09-17 RX ADMIN — PIPERACILLIN SODIUM,TAZOBACTAM SODIUM 4.5 G: 4; .5 INJECTION, POWDER, FOR SOLUTION INTRAVENOUS at 14:41

## 2018-09-17 RX ADMIN — ACETAMINOPHEN 1000 MG: 500 TABLET, FILM COATED ORAL at 06:00

## 2018-09-17 RX ADMIN — PANTOPRAZOLE SODIUM 20 MG: 40 TABLET, DELAYED RELEASE ORAL at 10:00

## 2018-09-17 RX ADMIN — MULTIVITAMIN 15 ML: LIQUID ORAL at 09:13

## 2018-09-17 RX ADMIN — VANCOMYCIN HYDROCHLORIDE 1000 MG: 1 INJECTION, SOLUTION INTRAVENOUS at 22:02

## 2018-09-17 RX ADMIN — CARBAMAZEPINE 150 MG: 100 SUSPENSION ORAL at 05:47

## 2018-09-17 RX ADMIN — HYDROCORTISONE SODIUM SUCCINATE 25 MG: 100 INJECTION, POWDER, FOR SOLUTION INTRAMUSCULAR; INTRAVENOUS at 16:10

## 2018-09-17 RX ADMIN — HYDROCORTISONE SODIUM SUCCINATE 50 MG: 100 INJECTION, POWDER, FOR SOLUTION INTRAMUSCULAR; INTRAVENOUS at 00:45

## 2018-09-17 RX ADMIN — PIPERACILLIN SODIUM,TAZOBACTAM SODIUM 4.5 G: 4; .5 INJECTION, POWDER, FOR SOLUTION INTRAVENOUS at 20:37

## 2018-09-17 RX ADMIN — LEVOTHYROXINE SODIUM 137 MCG: 137 TABLET ORAL at 09:16

## 2018-09-17 RX ADMIN — BRIVARACETAM 100 MG: 10 SOLUTION ORAL at 20:37

## 2018-09-17 RX ADMIN — PIPERACILLIN SODIUM,TAZOBACTAM SODIUM 4.5 G: 4; .5 INJECTION, POWDER, FOR SOLUTION INTRAVENOUS at 01:44

## 2018-09-17 RX ADMIN — CARBAMAZEPINE 150 MG: 100 SUSPENSION ORAL at 00:39

## 2018-09-17 RX ADMIN — Medication 250 MG: at 14:41

## 2018-09-17 RX ADMIN — POTASSIUM & SODIUM PHOSPHATES POWDER PACK 280-160-250 MG 1 PACKET: 280-160-250 PACK at 16:11

## 2018-09-17 ASSESSMENT — ACTIVITIES OF DAILY LIVING (ADL)
ADLS_ACUITY_SCORE: 40
ADLS_ACUITY_SCORE: 38
ADLS_ACUITY_SCORE: 40
ADLS_ACUITY_SCORE: 38
ADLS_ACUITY_SCORE: 40
ADLS_ACUITY_SCORE: 40
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
## 2018-09-17 NOTE — PLAN OF CARE
Problem: Patient Care Overview  Goal: Plan of Care/Patient Progress Review  Outcome: Improving  Patient alert & pleasant, unable to assess orientation due to TBI.  Lungs clear on RA.  Tele NSR.  Vss, denied pain when asked.  Patient puts thumb up or down for yes/no questions.  Turned [id number], aaron patent. Gtube intact. Getting IV LR at 125, and IV antibiotics.  Fall risk/bed alarm on. Maintained isolation for MRSA/VRE.

## 2018-09-17 NOTE — CONSULTS
CLINICAL NUTRITION SERVICES  -  ASSESSMENT NOTE      Recommendations Ordered by Registered Dietitian (RD):   1. EN Composition: Isosource 1.5 at 100 mL/hr for 12hrs (7:00am to 7:00pm), provides 1800 kcal (24kcal/kg), 82g protein (1.1g/kg), 211g CHO, 18g fiber and 912mL water. - Pt must be upright for TF  2. Feeding Tube Flush: 30mL before and after TF  Total fluids = TF (12hr at 100mL/hr) + flushes (30mL before and after) + IVF (100mL/hr) = 3372 mL/ day   Future/Additional Recommendations:   1. Continue to monitor IVF and need to increase free water flushes once IVF are off  2. Continue to monitor need for Certavite     Malnutrition:   % Weight Loss:  None noted  % Intake:  No decreased intake noted  Subcutaneous Fat Loss:  None observed  Muscle Loss:  Temporal region mild depletion  Fluid Retention:  None noted    Malnutrition Diagnosis: Patient does not meet two of the above criteria necessary for diagnosing malnutrition        REASON FOR ASSESSMENT  Keyon Farias is a 58 year old male seen by Registered Dietitian for Provider Order - Registered Dietitian to Assess and Order TF per Medical Nutrition protocol      NUTRITION HISTORY  - Information obtained from chart review   -Pt known to our service  -Per last visit:  From Chart review:  -Per RD previous RD notes:                        -Pt Lives with his mother, Savannah, who is his caretaker                        -TF via GJ tube  (replaced 18 - 18 Fr TORY GJ tube)                        -Has been on TF since 2016                        -TF provided by [facility]  -Pt receives TF at a rate of 100mL/hr starting at 7:00am and goes until completely finished    Jevity 1.5 (4.5 cartons) provides, 1600 kcal (22kcal/kg), 68g protein (0.9g/kg), 230g CHO, 24g fiber and 810mL free water  -TF given during the day during daytime to ensue upright seated position. Mother reports that when pt wants to lay down, TF will  to 60mL/hr       -Per phone call with  Savannah (mother) Pt on Jevity 1.5 (5 cans daily)     And free water given with medication about 120mL 4 times daily, however mother unclear of exact fluids due to multiple people giving care.    CURRENT NUTRITION ORDERS  Diet Order:     NPO       Current Intake/Tolerance:  -Jevity 1.5 (5cans daily) during the day while pt is awake. Providing 1778kcal (23kcal/kg), 76g protein (1g/kg), 255g CHO, 27g fiber and 900mL free water  -Mother would like to keep rate at no more than 100mL/hr   -Certavite  -Neutra-Phos, 1 pkt TID  -IVF at 100mL/hr  -BM x 1      PHYSICAL FINDINGS  Observed  Muscle Wasting   Obtained from Chart/Interdisciplinary Team  G-tube    ANTHROPOMETRICS  Height: 6'  Weight: 170 lbs 13.7 oz (77.5 kg)  Body mass index is 23.17 kg/(m^2).  Weight Status:  Normal BMI  IBW: 80.9 kg   % IBW: 96%  Weight History: wt fairly stable over the last year  Wt Readings from Last 10 Encounters:   09/17/18 77.5 kg (170 lb 13.7 oz)   09/03/18 72.3 kg (159 lb 6.3 oz)   07/07/18 74.7 kg (164 lb 10.9 oz)   05/19/18 71.8 kg (158 lb 3.2 oz)   05/05/18 77.4 kg (170 lb 10.2 oz)   04/04/18 72.6 kg (160 lb)   03/13/18 70.3 kg (155 lb)   02/21/18 73 kg (161 lb)   12/31/17 76.2 kg (168 lb)   10/07/17 77.3 kg (170 lb 6.7 oz)       LABS  Labs reviewed    MEDICATIONS  Medications reviewed      ASSESSED NUTRITION NEEDS PER APPROVED PRACTICE GUIDELINES:    Dosing Weight 76.2 kg (admit wt on 9/16)  Estimated Energy Needs: 3769-6022 kcals (25-30 Kcal/Kg)  Justification: maintenance  Estimated Protein Needs:  grams protein (1.2-1.5 g pro/Kg)  Justification: hypercatabolism with acute illness  Estimated Fluid Needs: 3151-7528 mL (1 mL/Kcal)  Justification: maintenance    MALNUTRITION:  % Weight Loss:  None noted  % Intake:  No decreased intake noted  Subcutaneous Fat Loss:  None observed  Muscle Loss:  Temporal region mild depletion  Fluid Retention:  None noted    Malnutrition Diagnosis: Patient does not meet two of the above criteria  necessary for diagnosing malnutrition      NUTRITION DIAGNOSIS:  Inadequate protein-energy intake related to reliant on enteral nutrition to meet 100% of assessed needs as evidenced by currently no TF running       NUTRITION INTERVENTIONS  Recommendations / Nutrition Prescription  Cyclic TF: Isosource 1.5 at rate of 100mL per hour x 12 hours (7:00am-7:pm)  Free water flushes: 30mL before and after TF  Continue Certavite for now       Implementation  1. Nutrition education: Not appropriate at this time due to patient condition  2. EN Composition: Isosource 1.5 at 100 mL/hr for 12hrs (7:00am to 7:00pm), provides 1800 kcal (24kcal/kg), 82g protein (1.1g/kg), 211g CHO, 18g fiber and 912mL water.   3. Feeding Tube Flush: 30mL before and after TF  Total fluids = TF (12hr at 100mL/hr) + flushes (30mL before and after) + IVF (100mL/hr) = 3372 mL/ day      Nutrition Goals  Enteral nutrition to initiate within 24 hrs     MONITORING AND EVALUATION:  Progress towards goals will be monitored and evaluated per protocol and Practice Guidelines        Meliza Washburn RD, LD

## 2018-09-17 NOTE — PHARMACY-VANCOMYCIN DOSING SERVICE
Pharmacy Vancomycin Note  Date of Service 2018  Patient's  1960   58 year old, male    Indication: Sepsis  Goal Trough Level: 15-20 mg/L  Day of Therapy: 2  Current Vancomycin regimen:  1000 mg IV q8h    Current estimated CrCl = Estimated Creatinine Clearance: 103.9 mL/min (based on Cr of 0.87).    Creatinine for last 3 days  9/15/2018:  8:55 PM Creatinine 0.76 mg/dL  2018:  5:10 AM Creatinine 0.85 mg/dL  2018:  8:15 AM Creatinine 0.87 mg/dL    Recent Vancomycin Levels (past 3 days)  2018: 10:40 AM Vancomycin Level Canceled, Test credited mg/L;  1:17 PM Vancomycin Level 20.3 mg/L    Vancomycin IV Administrations (past 72 hours)                   vancomycin (VANCOCIN) 1000 mg in dextrose 5% 200 mL PREMIX (mg) 1,000 mg New Bag 18 1312     1,000 mg New Bag  0541     1,000 mg New Bag 18 2112     1,000 mg New Bag  1103                Nephrotoxins and other renal medications (Future)    Start     Dose/Rate Route Frequency Ordered Stop    18 1200  piperacillin-tazobactam (ZOSYN) 4.5 g vial to attach to  mL bag     Comments:  Pharmacy can adjust dose based on renal function.    4.5 g  over 30 Minutes Intravenous EVERY 6 HOURS 18 0804      18 1100  vancomycin (VANCOCIN) 1000 mg in dextrose 5% 200 mL PREMIX      1,000 mg  200 mL/hr over 1 Hours Intravenous EVERY 8 HOURS 18 0804               Contrast Orders - past 72 hours     None          Interpretation of levels and current regimen:  Trough level is  Therapeutic    Has serum creatinine changed > 50% in last 72 hours: No    Urine output:  unable to determine    Renal Function: Stable    Plan:  1.  Continue Current Dose  2.  Pharmacy will check trough levels as appropriate in 1-3 Days.    3. Serum creatinine levels will be ordered daily for the first week of therapy and at least twice weekly for subsequent weeks.      Gary Singh        .

## 2018-09-17 NOTE — PROGRESS NOTES
X cover 2321    Called for blood culture positive with GPC in clusters  Patient already on vancomycin/zosyn and levaquin  Await final cultures

## 2018-09-17 NOTE — PROVIDER NOTIFICATION
Hospitalists contacted for positive BC on LUE of Gram + Cocci in clusters. PCR to be run.   Continue with current abx treatment.

## 2018-09-17 NOTE — PHARMACY-CONSULT NOTE
Pharmacy Tube Feeding Consult    Medication reviewed for administration by feeding tube and for potential food/drug interactions.    Recommendation: No changes are needed at this time.  OK to crush hydrocortisone tablets.  Levothyroxine absorption decreased when  given with enteral nutrition however, currently timed tube feed is stopped     Pharmacy will continue to follow as new medications are ordered.    Adarsh AndersonD
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
## 2018-09-17 NOTE — PLAN OF CARE
Problem: Patient Care Overview  Goal: Plan of Care/Patient Progress Review  Outcome: Improving  Pt Alert and pleasant. None verbal, but responds to questions with thumbs up, thumbs down. VSS with 's/50's. On RA now with SaO2 in the mid 90's. NPO. G tube patent and WDL. Lerma in place, patent and collecting good out put. LR running at 125ml/hr. Pt turned Q2h and able to assist with turns. Contact precautions maintained. Bed alarm on for safety. Rounded on freq.

## 2018-09-17 NOTE — PLAN OF CARE
Problem: Patient Care Overview  Goal: Plan of Care/Patient Progress Review  Outcome: Improving  Pt Alert and pleasant. None verbal, but responds to questions with thumbs up, thumbs down. VSS ,RA. NPO. G tube patent and WDL. Lerma in place, patent and collecting good out put. LR running at 125ml/hr. Pt turned Q2h and able to assist with turns. Contact precautions maintained. Bed alarm on for safety. Rounded on freq. Blood culture positive, MD aware. On different antibiotics. Tele SB.

## 2018-09-18 ENCOUNTER — APPOINTMENT (OUTPATIENT)
Dept: CT IMAGING | Facility: CLINIC | Age: 58
DRG: 871 | End: 2018-09-18
Attending: PHYSICIAN ASSISTANT
Payer: MEDICARE

## 2018-09-18 ENCOUNTER — APPOINTMENT (OUTPATIENT)
Dept: SPEECH THERAPY | Facility: CLINIC | Age: 58
DRG: 871 | End: 2018-09-18
Attending: PHYSICIAN ASSISTANT
Payer: MEDICARE

## 2018-09-18 LAB
ANION GAP SERPL CALCULATED.3IONS-SCNC: 6 MMOL/L (ref 3–14)
BASOPHILS # BLD AUTO: 0.1 10E9/L (ref 0–0.2)
BASOPHILS NFR BLD AUTO: 0.4 %
BUN SERPL-MCNC: 11 MG/DL (ref 7–30)
C DIFF TOX B STL QL: NEGATIVE
CALCIUM SERPL-MCNC: 8.8 MG/DL (ref 8.5–10.1)
CARBAMAZEPINE SERPL-MCNC: 16 MG/L (ref 4–12)
CHLORIDE SERPL-SCNC: 111 MMOL/L (ref 94–109)
CO2 SERPL-SCNC: 30 MMOL/L (ref 20–32)
CREAT SERPL-MCNC: 0.91 MG/DL (ref 0.66–1.25)
DIFFERENTIAL METHOD BLD: ABNORMAL
EOSINOPHIL # BLD AUTO: 0 10E9/L (ref 0–0.7)
EOSINOPHIL NFR BLD AUTO: 0.3 %
ERYTHROCYTE [DISTWIDTH] IN BLOOD BY AUTOMATED COUNT: 13.6 % (ref 10–15)
GFR SERPL CREATININE-BSD FRML MDRD: 86 ML/MIN/1.7M2
GLUCOSE BLDC GLUCOMTR-MCNC: 170 MG/DL (ref 70–99)
GLUCOSE BLDC GLUCOMTR-MCNC: 78 MG/DL (ref 70–99)
GLUCOSE SERPL-MCNC: 128 MG/DL (ref 70–99)
HCT VFR BLD AUTO: 39 % (ref 40–53)
HGB BLD-MCNC: 13.2 G/DL (ref 13.3–17.7)
IMM GRANULOCYTES # BLD: 0.1 10E9/L (ref 0–0.4)
IMM GRANULOCYTES NFR BLD: 0.6 %
LYMPHOCYTES # BLD AUTO: 1.3 10E9/L (ref 0.8–5.3)
LYMPHOCYTES NFR BLD AUTO: 11.6 %
MCH RBC QN AUTO: 30.4 PG (ref 26.5–33)
MCHC RBC AUTO-ENTMCNC: 33.8 G/DL (ref 31.5–36.5)
MCV RBC AUTO: 90 FL (ref 78–100)
MONOCYTES # BLD AUTO: 0.7 10E9/L (ref 0–1.3)
MONOCYTES NFR BLD AUTO: 6.1 %
NEUTROPHILS # BLD AUTO: 9 10E9/L (ref 1.6–8.3)
NEUTROPHILS NFR BLD AUTO: 81 %
NRBC # BLD AUTO: 0 10*3/UL
NRBC BLD AUTO-RTO: 0 /100
PLATELET # BLD AUTO: 169 10E9/L (ref 150–450)
POTASSIUM SERPL-SCNC: 3.1 MMOL/L (ref 3.4–5.3)
POTASSIUM SERPL-SCNC: 4.6 MMOL/L (ref 3.4–5.3)
RBC # BLD AUTO: 4.34 10E12/L (ref 4.4–5.9)
SODIUM SERPL-SCNC: 147 MMOL/L (ref 133–144)
SPECIMEN SOURCE: NORMAL
VANCOMYCIN SERPL-MCNC: 27.2 MG/L
WBC # BLD AUTO: 11.1 10E9/L (ref 4–11)

## 2018-09-18 PROCEDURE — 36415 COLL VENOUS BLD VENIPUNCTURE: CPT | Performed by: PHYSICIAN ASSISTANT

## 2018-09-18 PROCEDURE — A9270 NON-COVERED ITEM OR SERVICE: HCPCS | Mod: GY | Performed by: INTERNAL MEDICINE

## 2018-09-18 PROCEDURE — 25000125 ZZHC RX 250: Performed by: INTERNAL MEDICINE

## 2018-09-18 PROCEDURE — 71250 CT THORAX DX C-: CPT

## 2018-09-18 PROCEDURE — 12000000 ZZH R&B MED SURG/OB

## 2018-09-18 PROCEDURE — 37000011 ZZH ANESTHESIA WARD SERVICE: Performed by: NURSE ANESTHETIST, CERTIFIED REGISTERED

## 2018-09-18 PROCEDURE — 87040 BLOOD CULTURE FOR BACTERIA: CPT | Performed by: INTERNAL MEDICINE

## 2018-09-18 PROCEDURE — 92610 EVALUATE SWALLOWING FUNCTION: CPT | Mod: GN | Performed by: SPEECH-LANGUAGE PATHOLOGIST

## 2018-09-18 PROCEDURE — 25000128 H RX IP 250 OP 636: Performed by: INTERNAL MEDICINE

## 2018-09-18 PROCEDURE — 36415 COLL VENOUS BLD VENIPUNCTURE: CPT | Performed by: INTERNAL MEDICINE

## 2018-09-18 PROCEDURE — 87493 C DIFF AMPLIFIED PROBE: CPT | Performed by: INTERNAL MEDICINE

## 2018-09-18 PROCEDURE — 40000225 ZZH STATISTIC SLP WARD VISIT: Performed by: SPEECH-LANGUAGE PATHOLOGIST

## 2018-09-18 PROCEDURE — 80202 ASSAY OF VANCOMYCIN: CPT | Performed by: INTERNAL MEDICINE

## 2018-09-18 PROCEDURE — 99232 SBSQ HOSP IP/OBS MODERATE 35: CPT | Performed by: PHYSICIAN ASSISTANT

## 2018-09-18 PROCEDURE — 85025 COMPLETE CBC W/AUTO DIFF WBC: CPT | Performed by: PHYSICIAN ASSISTANT

## 2018-09-18 PROCEDURE — 25000132 ZZH RX MED GY IP 250 OP 250 PS 637: Mod: GY | Performed by: INTERNAL MEDICINE

## 2018-09-18 PROCEDURE — 84132 ASSAY OF SERUM POTASSIUM: CPT | Performed by: PHYSICIAN ASSISTANT

## 2018-09-18 PROCEDURE — 80048 BASIC METABOLIC PNL TOTAL CA: CPT | Performed by: PHYSICIAN ASSISTANT

## 2018-09-18 PROCEDURE — 25000132 ZZH RX MED GY IP 250 OP 250 PS 637: Mod: GY | Performed by: PHYSICIAN ASSISTANT

## 2018-09-18 PROCEDURE — 40000893 ZZH STATISTIC PT IP EVAL DEFER: Performed by: PHYSICAL THERAPIST

## 2018-09-18 PROCEDURE — 00000146 ZZHCL STATISTIC GLUCOSE BY METER IP

## 2018-09-18 PROCEDURE — 99222 1ST HOSP IP/OBS MODERATE 55: CPT | Performed by: NURSE PRACTITIONER

## 2018-09-18 PROCEDURE — A9270 NON-COVERED ITEM OR SERVICE: HCPCS | Mod: GY | Performed by: PHYSICIAN ASSISTANT

## 2018-09-18 PROCEDURE — 27210429 ZZH NUTRITION PRODUCT INTERMEDIATE LITER

## 2018-09-18 PROCEDURE — 80156 ASSAY CARBAMAZEPINE TOTAL: CPT | Performed by: PHYSICIAN ASSISTANT

## 2018-09-18 RX ORDER — POTASSIUM CHLORIDE 1.5 G/1.58G
20-40 POWDER, FOR SOLUTION ORAL
Status: DISCONTINUED | OUTPATIENT
Start: 2018-09-18 | End: 2018-09-21 | Stop reason: HOSPADM

## 2018-09-18 RX ORDER — POTASSIUM CL/LIDO/0.9 % NACL 10MEQ/0.1L
10 INTRAVENOUS SOLUTION, PIGGYBACK (ML) INTRAVENOUS
Status: DISCONTINUED | OUTPATIENT
Start: 2018-09-18 | End: 2018-09-21 | Stop reason: HOSPADM

## 2018-09-18 RX ORDER — CEFAZOLIN SODIUM 1 G/50ML
1250 SOLUTION INTRAVENOUS EVERY 12 HOURS
Status: DISCONTINUED | OUTPATIENT
Start: 2018-09-18 | End: 2018-09-19

## 2018-09-18 RX ORDER — POTASSIUM CHLORIDE 7.45 MG/ML
10 INJECTION INTRAVENOUS
Status: DISCONTINUED | OUTPATIENT
Start: 2018-09-18 | End: 2018-09-21 | Stop reason: HOSPADM

## 2018-09-18 RX ORDER — POTASSIUM CHLORIDE 29.8 MG/ML
20 INJECTION INTRAVENOUS
Status: DISCONTINUED | OUTPATIENT
Start: 2018-09-18 | End: 2018-09-21 | Stop reason: HOSPADM

## 2018-09-18 RX ORDER — POTASSIUM CHLORIDE 1500 MG/1
20-40 TABLET, EXTENDED RELEASE ORAL
Status: DISCONTINUED | OUTPATIENT
Start: 2018-09-18 | End: 2018-09-21 | Stop reason: HOSPADM

## 2018-09-18 RX ADMIN — HYDROCORTISONE SODIUM SUCCINATE 25 MG: 100 INJECTION, POWDER, FOR SOLUTION INTRAMUSCULAR; INTRAVENOUS at 05:03

## 2018-09-18 RX ADMIN — PIPERACILLIN SODIUM,TAZOBACTAM SODIUM 4.5 G: 4; .5 INJECTION, POWDER, FOR SOLUTION INTRAVENOUS at 05:04

## 2018-09-18 RX ADMIN — BRIVARACETAM 100 MG: 10 SOLUTION ORAL at 21:26

## 2018-09-18 RX ADMIN — POTASSIUM CHLORIDE 40 MEQ: 1.5 POWDER, FOR SOLUTION ORAL at 11:14

## 2018-09-18 RX ADMIN — BRIVARACETAM 100 MG: 10 SOLUTION ORAL at 10:08

## 2018-09-18 RX ADMIN — VANCOMYCIN HYDROCHLORIDE 1250 MG: 10 INJECTION, POWDER, LYOPHILIZED, FOR SOLUTION INTRAVENOUS at 21:27

## 2018-09-18 RX ADMIN — CARBAMAZEPINE 150 MG: 100 SUSPENSION ORAL at 06:20

## 2018-09-18 RX ADMIN — PIPERACILLIN SODIUM,TAZOBACTAM SODIUM 4.5 G: 4; .5 INJECTION, POWDER, FOR SOLUTION INTRAVENOUS at 16:49

## 2018-09-18 RX ADMIN — HYDROCORTISONE 10 MG: 10 TABLET ORAL at 21:08

## 2018-09-18 RX ADMIN — PIPERACILLIN SODIUM,TAZOBACTAM SODIUM 4.5 G: 4; .5 INJECTION, POWDER, FOR SOLUTION INTRAVENOUS at 10:36

## 2018-09-18 RX ADMIN — Medication 250 MG: at 21:08

## 2018-09-18 RX ADMIN — MULTIVITAMIN 15 ML: LIQUID ORAL at 10:08

## 2018-09-18 RX ADMIN — LEVOTHYROXINE SODIUM 137 MCG: 137 TABLET ORAL at 10:08

## 2018-09-18 RX ADMIN — Medication 250 MG: at 10:08

## 2018-09-18 RX ADMIN — POTASSIUM & SODIUM PHOSPHATES POWDER PACK 280-160-250 MG 1 PACKET: 280-160-250 PACK at 16:49

## 2018-09-18 RX ADMIN — PANTOPRAZOLE SODIUM 20 MG: 40 TABLET, DELAYED RELEASE ORAL at 10:08

## 2018-09-18 RX ADMIN — POTASSIUM & SODIUM PHOSPHATES POWDER PACK 280-160-250 MG 1 PACKET: 280-160-250 PACK at 21:08

## 2018-09-18 RX ADMIN — POTASSIUM CHLORIDE 20 MEQ: 1.5 POWDER, FOR SOLUTION ORAL at 13:13

## 2018-09-18 RX ADMIN — CARBAMAZEPINE 150 MG: 100 SUSPENSION ORAL at 00:41

## 2018-09-18 RX ADMIN — VANCOMYCIN HYDROCHLORIDE 1000 MG: 1 INJECTION, SOLUTION INTRAVENOUS at 06:20

## 2018-09-18 RX ADMIN — HYDROCORTISONE 15 MG: 10 TABLET ORAL at 10:07

## 2018-09-18 RX ADMIN — POTASSIUM & SODIUM PHOSPHATES POWDER PACK 280-160-250 MG 1 PACKET: 280-160-250 PACK at 10:07

## 2018-09-18 ASSESSMENT — ACTIVITIES OF DAILY LIVING (ADL)
ADLS_ACUITY_SCORE: 40

## 2018-09-18 NOTE — PLAN OF CARE
Problem: Patient Care Overview  Goal: Plan of Care/Patient Progress Review  Discharge Planner OT   Patient plan for discharge: Unknown  Current status: Orders received and chart reviewed. Pt. admitted w/ fever, rigors, increased oral secretions. Pt. had a recent admit for aspiration pneumonia. PMH:TBI. at baseline, pt. is dependent w/ all ADL's + functional transfers. No skilled inpatient OT intervention warranted. Per chart, pt. is getting Home OT/PT/SLP/RN + PCA servies. Pt. resides w/ his mother in a hadicap asccessible home.  Barriers to return to prior living situation: None indictaed  Recommendations for discharge: Antiicpate pt. will return home w/ continued HHC/Home care services/assist from mother.  Rationale for recommendations: Pt. Is currently at baseline for ADL's;no skilled inpt. OT intervention needed/rec. at this time. Will complete order/sign-off.       Entered by: Annie Gonzalez 09/18/2018 12:45 PM

## 2018-09-18 NOTE — PLAN OF CARE
Problem: Patient Care Overview  Goal: Plan of Care/Patient Progress Review  Outcome: No Change  Pt is alert, non-verbal with some gesture responses (nods yes/no, thumbs up). VSS on RA except occasional bradycardia. Tele NSR. Denies pain. T/R Q 2 in bed. Strict NPO. TF @100ml/hr. . Incontinent of bowel with 1 large loose stool. Lerma patent with good UOP. K+ 3.1, replaced, redraw @1700. Critical Tegretol 16.0 (dose held per MD), Neuro consult pending. Critical Vanco 27.2. BC pending. Chest CT with bilateral opacities (increased from previous study) R>L and bilat pleural effusions R>L. DC pending progress.

## 2018-09-18 NOTE — PROGRESS NOTES
09/18/18 1020   General Information   Onset Date 09/17/18   Start of Care Date 09/18/18   Referring Physician Concepc[place] PRICE   Patient Profile Review/OT: Additional Occupational Profile Info See Profile for full history and prior level of function   Patient/Family Goals Statement Unable to state and no family present.    Swallowing Evaluation Bedside swallow evaluation   Behaviorial Observations Alert   Mode of current nutrition NPO;PEG   Respiratory Status Room air   Comments Keyon Farias is a 58-year-old gentleman with a history of traumatic brain injury and recurrent aspiration pneumonias and sepsis with chronic feeding tube placement who has panhypopituitarism, some seizure disorder, who is admitted with sepsis, recurrent aspiration pneumonia, septic shock, being admitted for further treatment and stabilization. Patient has been seen by this department on multiple admissions.    Clinical Swallow Evaluation   Oral Musculature anomalies present   Structural Abnormalities present   Dentition present and adequate   Secretion Management problems swallowing secretions   Mucosal Quality dry;sticky   Mandibular Strength and Mobility intact   Oral Labial Strength and Mobility impaired retraction;impaired pursing;impaired seal;impaired coordination   Lingual Strength and Mobility impaired protrusion;impaired anterior elevation;impaired right lateral movement;impaired coordination   Velar Elevation impaired   Buccal Strength and Mobility impaired   Laryngeal Function Voicing initiated   Oral Musculature Comments Moderately impaired.    Additional Documentation Yes   Clinical Swallow Eval: Honey Thick Liquid Texture Trial   Mode of Presentation, Honey spoon;fed by clinician   Volume of Honey Presented 2 1/2 teaspoon amounts of water   Oral Phase, Honey Poor AP movement;Residue in oral cavity;Premature pharyngeal entry;other (see comments)  (Tongue thrusting)   Pharyngeal Phase, Honey impaired;reduction in laryngeal  movement   Diagnostic Statement Suspect penetration silent aspiration.   Clinical Swallow Eval: Puree Solid Texture Trial   Mode of Presentation, Puree spoon;fed by clinician   Volume of Puree Presented 1/8    Oral Phase, Puree Poor AP movement;Residue in oral cavity;Premature pharyngeal entry   Oral Residue, Puree soft palate   Pharyngeal Phase, Puree impaired;coughing/choking;reduction in laryngeal movement   Diagnostic Statement Overt Aspiration with delayed cough.    Swallow Compensations   Swallow Compensations Reduce amounts;Pacing   Results Aspiration;Suspect silent aspiration;Oral difficulties only   Swallow Eval: Clinical Impressions   Skilled Criteria for Therapy Intervention No significant expected  improvement in functional status   Functional Assessment Scale (FAS) 1   Treatment Diagnosis Severe oral and pharyngeal dysphagia   Diet texture recommendations NPO   Anticipated Discharge Disposition home w/ home health  (Nursing)   Risks and Benefits of Treatment have been explained. Yes   Patient, family and/or staff in agreement with Plan of Care Yes   Clinical Impression Comments Patient presents with severe oral and pharyngeal dysphagia at bedside secondary to TBI, CVA, seizures and infection in the setting of pneumonia. Patient with moderate to severe oral function. Patient not tolerating own secretions and were dried throughout his oral cavity. Very small amount of honey thick liquid demonstrated poor bolus awareness and needed verbal cues to close his mouth around the spoon. Tongue thrusting and no bolus control, spilling over the back of the tongue and severely reduced laryngeal elevation with suspect silent aspiration. 1/8 amount of pudding given with poor oral control, tongue thrusting and spilled into the airway with overt Sx of aspiration. These bedside results mirror recent video swallow study completed at outside hospital on 5/8/18, with silent aspiration of honey thick liquids and pureed  texture, due to no epiglottic inversion with some nasopharyngeal reflux. Patient is considered a severe to profound risk for aspiration with any PO intake and even on his own secretions. Do not anticipate any further gains can be made in regards to his swallow function. Recommend: 1. Strict NPO with frequent oral cares with suctioning. 2. No further skilled intervention indicated at this time.    Total Evaluation Time   Total Evaluation Time (Minutes) 20

## 2018-09-18 NOTE — PLAN OF CARE
Problem: Patient Care Overview  Goal: Plan of Care/Patient Progress Review  Discharge Planner SLP   Patient plan for discharge: Not able to state.   Current status: Bedside swallow evaluation completed. Patient presents with severe oral and pharyngeal dysphagia at bedside secondary to TBI, CVA, seizures and infection in the setting of pneumonia. Patient with moderate to severe oral function. Patient not tolerating own secretions and were dried throughout his oral cavity. Very small amount of honey thick liquid demonstrated poor bolus awareness and needed verbal cues to close his mouth around the spoon. Tongue thrusting and no bolus control, spilling over the back of the tongue and severely reduced laryngeal elevation with suspect silent aspiration. 1/8 amount of pudding given with poor oral control, tongue thrusting and spilled into the airway with overt Sx of aspiration. These bedside results mirror recent video swallow study completed at outside hospital on 5/8/18, with silent aspiration of honey thick liquids and pureed texture, due to no epiglottic inversion with some nasopharyngeal reflux. Patient is considered a severe to profound risk for aspiration with any PO intake and even on his own secretions. Do not anticipate any further gains can be made in regards to his swallow function. Recommend: 1. Strict NPO with frequent oral cares with suctioning. 2. No further skilled intervention indicated at this time.   Barriers to return to prior living situation: None  Recommendations for discharge: Return to home with previous services.   Rationale for recommendations: No further skilled intervention is indicated at this time. Will sign off.        Entered by: Heather Adler 09/18/2018 10:55 AM

## 2018-09-18 NOTE — PROGRESS NOTES
Pt is tolerating 12-hr daytime TF: Isosource 1.5 @ 100 mL/hr from 7:00 am to 7:00 pm - provides 1800 kcal (24kcal/kg), 82g protein (1.1g/kg), 211g CHO, 18g fiber and 912mL water.    Maintenance IVF d/c'd, will increase H2O flushes to 150 mL q 4 hr for total (TF + flushes) = 1800 mL/day.  Net I/O -3,404. Na 147 (H).    Unique Schultz, YONIS  Pager [phone] (M-F)            [phone] (W/E & Hol)

## 2018-09-18 NOTE — PLAN OF CARE
Problem: Patient Care Overview  Goal: Plan of Care/Patient Progress Review  Outcome: No Change  Pt alert, nonverbal baseline. VSS on RA except bradycardic. Turn/repo q2, blanchable redness to coccyx, mepilex changed, CD&I. Pt denies pain. NPO. Nutrition placed oreders for TF 7AM-7PM, pt received 2 hours of TB this evening. Lerma patent, good output. 1 large incontinent BM this shift. IV LR infusing 100ml/hr. Zosyn & vanco abx. Tele SR hector. Contact precautions maintained. Nursing continue to monitor.

## 2018-09-18 NOTE — PROGRESS NOTES
[facility]    Infectious Disease Progress Note    Date of Service (when I saw the patient): 09/18/2018     Assessment & Plan   Keyon Farias is a 58 year old male who was admitted on 9/15/2018.     Impression:  1. 58 y.o male with TBI.   2. Right sided hemiparesis.   3. Contractures.   4. Recurrent pneumonia.   5. PEG tube.   6. Admitted with fevers, all the regular symptoms of aspiration pneumonia.   7. 2/2 cultures positive for staph epi. No intravascular devices.      Recommendations;   Continue vancomycin, do follow up blood cultures, avoid any longterm IV lines.   Consider Ct chest to further define the lung process.   SLP/ GI eval for etiology of recurrent aspiration with feeding tube.   Continue on zosyn.       Senait Orona MD    Interval History   He is afebrile   Please obtain 2 cultures at a time       Physical Exam   Temp: 97.7  F (36.5  C) Temp src: Axillary BP: 99/60 Pulse: 87 Heart Rate: 47 Resp: 16 SpO2: 100 % O2 Device: None (Room air)    Vitals:    09/16/18 0600 09/17/18 0724 09/18/18 0702   Weight: 76.2 kg (167 lb 15.9 oz) 77.5 kg (170 lb 13.7 oz) 75.6 kg (166 lb 10.7 oz)     Vital Signs with Ranges  Temp:  [97.5  F (36.4  C)-97.9  F (36.6  C)] 97.7  F (36.5  C)  Pulse:  [53-87] 87  Heart Rate:  [47] 47  Resp:  [16] 16  BP: ()/(60-66) 99/60  SpO2:  [97 %-100 %] 100 %    Constitutional: non verbal, not in distress   Lungs: Clear to auscultation bilaterally, no crackles or wheezing  Cardiovascular: Regular rate and rhythm, normal S1 and S2, and no murmur noted  Abdomen: Normal bowel sounds, soft, non-distended, non-tender, PEG tube in place  Skin: No rashes, no cyanosis, no edema  Other:    Medications     IV fluid REPLACEMENT ONLY         Brivaracetam  100 mg Per PEG tube BID     carBAMazepine  150 mg Oral or G tube Q6H ALONDRA     hydrocortisone  10 mg Oral QPM     hydrocortisone  15 mg Oral QAM     levothyroxine  137 mcg Per Feeding Tube Daily     lidocaine 2 %  10 mL Urethral  Once     multivitamins with minerals  15 mL Per Feeding Tube Daily     pantoprazole  20 mg Oral QAM     piperacillin-tazobactam  4.5 g Intravenous Q6H     potassium & sodium phosphates  1 packet Per Feeding Tube TID     saccharomyces boulardii  250 mg Oral or Feeding Tube BID     sodium chloride (PF)  3 mL Intracatheter Q8H     vancomycin (VANCOCIN) IV  1,000 mg Intravenous Q8H       Data   All microbiology laboratory data reviewed.  Recent Labs   Lab Test  09/18/18   0917  09/17/18   0815  09/16/18   0510   WBC  11.1*  10.1  18.8*   HGB  13.2*  9.7*  11.1*   HCT  39.0*  29.2*  32.5*   MCV  90  89  87   PLT  169  133*  154     Recent Labs   Lab Test  09/18/18   0917  09/17/18   0815  09/16/18   0510   CR  0.91  0.87  0.85     No lab results found.  Recent Labs   Lab Test  09/17/18   1433  09/15/18   2126  09/15/18   2055  09/15/18   2050  09/02/18   0123  09/02/18   0100  09/01/18   2330  08/09/18   1549  08/09/18   1403   CULT  No growth after 9 hours  No growth  Cultured on the 2nd day of incubation:  Gram positive cocci in clusters  *  Critical Value/Significant Value, preliminary result only, called to and read back by  CEDRICK SPICER RN @0010 9/17/18. SCG    Susceptibility testing done on previous specimen  Cultured on the 1st day of incubation:  Staphylococcus epidermidis  *  Critical Value/Significant Value, preliminary result only, called to and read back by  Edgar Vidales RN at 2313 on 9/16/18 by FELTON.    (Note)  POSITIVE for STAPHYLOCOCCUS EPIDERMIDIS and POSITIVE for the mecA  gene (resistant to methicillin) by [employer] multiplex nucleic acid  test. Final identification and antimicrobial susceptibility testing  will be verified by standard methods.    Specimen tested with [employer] multiplex, gram-positive blood culture  nucleic acid test for the following targets: Staph aureus, Staph  epidermidis, Staph lugdunensis, other Staph species, Enterococcus  faecalis, Enterococcus faecium, Streptococcus  species, S. agalactiae,  S. anginosus grp., S. pneumoniae, S. pyogenes, Listeria sp., mecA  (methicillin resistance) and Alberto/B (vancomycin resistance).    Critical Value/Significant Value called to and read back by CEDRICK SPICER RN @0206 9/17/18. SCG      No growth  No growth  No growth  No growth  No growth

## 2018-09-18 NOTE — PROGRESS NOTES
MD Notification    Notified Person: MD    Notified Person Name: Lakia Levineashish RICHEY    Notification Date/Time: 9/18 @1230    Notification Interaction: Web paged    Purpose of Notification: Critical Tegretol level 16.0. Dose due now, Neuro hasn't seen yet. Please advise.    Orders Received: pending    Comments: Hold Tegretol dose. Neuro consult pending.

## 2018-09-18 NOTE — PLAN OF CARE
Problem: Patient Care Overview  Goal: Plan of Care/Patient Progress Review  Outcome: Improving  Patient is alert, nonverbal, does nod yes or no to some questions. VSS on room air except bradycardic at times. Tele NSR. Blood glucose 78. Lerma is patent with adequate output, had one loose BM. Continues on IVF and IV antibiotics. TF will be turned on at 0700. Blanchable redness on coccyx, turn & reposition every two hours.

## 2018-09-18 NOTE — PROVIDER NOTIFICATION
MD Notification    Notified Person: MD    Notified Person Name: Lakia Beebe KAIDEN    Notification Date/Time: 9/18 @ 1424    Notification Interaction: Web paged    Purpose of Notification: FYI: Echo Smith 27.2. Pharmacist aware.    Orders Received:     Comments: Pharmacist aware and will dose

## 2018-09-18 NOTE — CONSULTS
[facility]    Neurology Consultation Note     Keyon Farias MRN# [id number]   YOB: 1960 Age: 58 year old    Code Status:Full Code   Date of Admission: 9/15/2018  Date of Consult: 09/18/2018    _________________________________   Primary Care Physician   Carlos Gomez      ______________________________________________         Assessment & Plan     Reason for consult: I was asked by Lakia Beebe PA-C to evaluate this patient for supratherapeutic carbamazepine level        ______________________________________________  #. (R56.9) Seizures (H)  --complicated seizure history  --PTA on carbamazepine 150 mg q6h & brivaracetam 100 mg BID  --history of elevated carbamazepine levels while on antibiotics  -----currently admitted for pneumonia/sepsis on multiple antibiotics  --hold carbamazepine today and recheck level in the morning  -----as in the past, will likely need decreased dose of carbamazepine while on antibiotics  #. (J18.9) Pneumonia due to infectious organism, unspecified laterality, unspecified part of lung  (primary encounter diagnosis)  --management per primary service  --on multiple antibiotics  #. (A41.9,  R65.21) Septic shock (H)  --management per primary service  --on multiple antibiotics  #. DVT Prophylaxis  --per primary service        #. Code Status: Full Code      Chief Complaint   ______________________________________________  supratherapeutic carbamazepine level  History is obtained from the electronic health record    History of Present Illness   ______________________________________________  Keyon Farias is a 58 year old male who presented 9/16/18 with fever, rigors, increased oral secretions. Patient has a complicated medical history with TBI, seizures, right hemiparesis. Mother is his primary caregiver. He has had multiple admissions this year, multiple times related to pneumonia and sepsis. Patient is nonverbal. Evening of admission had increased oral  secretions, and mother found him shaking and slightly altered with a fever. [facility]. Found again to be septic and started on antibiotics. Drug levels checked - carbamazepine found to be high. Neurology was consulted for management.    On exam, patient is right hemiplegic at baseline with contractures, nonverbal at baseline. He communicates with nodding/shaking head and thumbs up. Very pleasant, follows commands. No seizures. Holding carbamazepine - recheck carbamazepine level in the morning and will restart medication likely at a lower dose, as has been done in the past. History of high carbamazepine levels while on antibiotics, requiring holding and decreasing dose while on antibiotics.     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      Pneumonia      Seizures (H)     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     Septic shock (H)      Spastic hemiplegia affecting dominant side (H)     related to wil injury     Thyroid disease      Tracheostomy care (H)      Traumatic brain injury (H) 1989     Unspecified cerebral artery occlusion with cerebral infarction 1989     UTI (urinary tract infection)      Ventricular fibrillation (H)      Ventricular tachyarrhythmia (H)      Past Surgical History   ______________________________________________  Past Surgical History:   Procedure Laterality Date     ENDOSCOPIC ULTRASOUND UPPER GASTROINTESTINAL TRACT (GI) N/A 1/30/2017    Procedure: ENDOSCOPIC ULTRASOUND, ESOPHAGOSCOPY / UPPER GASTROINTESTINAL TRACT (GI);  Surgeon: Nan  Jus Sinclair MD;  Location:  OR     ENDOSCOPIC ULTRASOUND, ESOPHAGOSCOPY, GASTROSCOPY, DUODENOSCOPY (EGD), NECROSECTOMY N/A 2/7/2017    Procedure: ENDOSCOPIC ULTRASOUND, ESOPHAGOSCOPY, GASTROSCOPY, DUODENOSCOPY (EGD), NECROSECTOMY;  Surgeon: Jack Marcus MD;  Location:  OR     ESOPHAGOSCOPY, GASTROSCOPY, DUODENOSCOPY (EGD), COMBINED  3/13/2014    Procedure: COMBINED ESOPHAGOSCOPY, GASTROSCOPY, DUODENOSCOPY (EGD), BIOPSY SINGLE OR MULTIPLE;  gastroscopy;  Surgeon: Digna Rhodes MD;  Location: [facility]     ESOPHAGOSCOPY, GASTROSCOPY, DUODENOSCOPY (EGD), COMBINED N/A 12/6/2016    Procedure: COMBINED ESOPHAGOSCOPY, GASTROSCOPY, DUODENOSCOPY (EGD);  Surgeon: Digna Rhodes MD;  Location: [facility]     ESOPHAGOSCOPY, GASTROSCOPY, DUODENOSCOPY (EGD), COMBINED N/A 2/7/2017    Procedure: COMBINED ENDOSCOPIC ULTRASOUND, ESOPHAGOSCOPY, GASTROSCOPY, DUODENOSCOPY (EGD), FINE NEEDLE ASPIRATE/BIOPSY;  Surgeon: Too Thakur MD;  Location:  OR     HEAD & NECK SURGERY      reconstructive facial surgery following accident in 1989     LAPAROSCOPIC APPENDECTOMY  7/30/2013    Procedure: LAPAROSCOPIC APPENDECTOMY;  LAPAROSCOPIC APPENDECTOMY;  Surgeon: Manish Pierce MD;  Location:  OR     LAPAROSCOPIC ASSISTED INSERTION TUBE GASTROTOMY N/A 9/7/2016    Procedure: LAPAROSCOPIC ASSISTED INSERTION TUBE GASTROSTOMY;  Surgeon: Manish Pierce MD;  Location:  OR     ORTHOPEDIC SURGERY      right hand repair     TRACHEOSTOMY N/A 9/3/2016    Procedure: TRACHEOSTOMY;  Surgeon: João Ortiz MD;  Location:  OR     TRACHEOSTOMY N/A 12/2/2016    Procedure: TRACHEOSTOMY;  Surgeon: João Ortiz MD;  Location:  OR     VASCULAR SURGERY       Prior to Admission Medications   ______________________________________________  Prior to Admission Medications   Prescriptions Last Dose Informant Patient Reported? Taking?   Brivaracetam (BRIVIACT) 10 MG/ML solution 9/15/2018 at 2100  Mother Yes Yes   Si mg by Oral or FT or NG tube route 2 times daily @0900 and 2100   Multiple Vitamins-Minerals (CENTRUM SILVER) per tablet 9/15/2018 Mother Yes Yes   Sig: Take 1 tablet by mouth daily Crush and feed via j-tube   Vitamin D, Cholecalciferol, 1000 units TABS 9/15/2018 at AM Mother Yes Yes   Sig: Take 2 tablets by mouth every morning   Wheat Dextrin (BENEFIBER PO) 9/15/2018 at AM Mother Yes Yes   Sig: Take 2 teaspoonful by mouth daily    acetaminophen (TYLENOL) 500 MG tablet 9/15/2018 at 2100 Mother Yes Yes   Si,000 mg by Oral or FT or NG tube route every 6 hours as needed for mild pain   albuterol (2.5 MG/3ML) 0.083% neb solution PRN Mother Yes No   Sig: Take 1 vial by nebulization every 4 hours as needed for shortness of breath / dyspnea or wheezing   bacitracin ointment 9/15/2018 at PRN Mother Yes Yes   Sig: Apply topically daily as needed for wound care To PEG site.   calcium carbonate 1250 (500 CA) MG/5ML SUSP suspension 9/15/2018 at 2100 Mother No Yes   Si mLs (1,250 mg) by Per J Tube route 3 times daily (with meals)   carBAMazepine (TEGRETOL) 100 MG/5ML suspension 9/15/2018 at 1800 Mother Yes Yes   Si mg by Oral or FT or NG tube route every 6 hours    hydrocortisone (CORTEF) 5 MG tablet 9/15/2018 at 1500 Mother Yes Yes   Sig: 10 mg by Oral or FT or NG tube route every evening 2 tablets x 5mg=10mg   hydrocortisone (CORTEF) 5 MG tablet 9/15/2018 at 0900 Mother Yes Yes   Sig: 15 mg by Oral or FT or NG tube route every morning 3 tablets x 5mg=15mg   levothyroxine (SYNTHROID/LEVOTHROID) 137 MCG tablet 9/15/2018 at 0500 Mother Yes Yes   Si mcg by Oral or FT or NG tube route daily   melatonin (MELATONIN) 1 MG/ML LIQD liquid 9/15/2018 at 2100 Mother Yes Yes   Si mg by Jejunal Tube route nightly as needed for sleep   pantoprazole (PROTONIX) SUSP suspension  Mother Yes No   Sig: Take 20 mg by mouth every morning    potassium & sodium phosphates (NEUTRA-PHOS) 280-160-250 MG  Packet 9/15/2018 at 2100 Mother Yes Yes   Sig: Take 1 packet by mouth 3 times daily 0900, 1500, 2100.    ranitidine (ZANTAC) 15 MG/ML syrup 9/15/2018 at 2100 Mother Yes Yes   Sig: Take 10 mLs by mouth 2 times daily @@ 0900 and 2100   testosterone cypionate (DEPOTESTOTERONE CYPIONATE) 200 MG/ML injection  Mother Yes Yes   Sig: Inject 100 mg into the muscle See Admin Instructions Every 2 weeks.      Facility-Administered Medications: None     Allergies   Allergies   Allergen Reactions     Dilantin [Phenytoin Sodium]      Valproic Acid      Toxicity c bone marrow suspension, elevated ammonia levels        Social History   ______________________________________________  Social History     Social History     Marital status: Single     Spouse name: N/A     Number of children: N/A     Years of education: N/A     Social History Main Topics     Smoking status: Former Smoker     Quit date: 4/23/1989     Smokeless tobacco: Never Used     Alcohol use No     Drug use: No     Sexual activity: No     Other Topics Concern     None     Social History Narrative     Reviewed in chart - patient is nonverbal    Family History   ______________________________________________  Reviewed and not felt to be contributory.     Reviewed in chart - patient is nonverbal    Review of Systems   ______________________________________________  Review of systems is limited by patient factors - patient is nonverbal      Physical Exam   ______________________________________________  Weight:166 lbs 10.68 oz; Height:Data Unavailable  Temp: 97.7  F (36.5  C) Temp src: Axillary BP: 99/60 Pulse: 87 Heart Rate: 47 Resp: 16 SpO2: 100 % O2 Device: None (Room air)    General Appearance:  No acute distress  Neuro:       Mental Status Exam:   Awake, alert, unable to assess orientation. Nonverbal at baseline, nods/shakes head and thumbs up to communicate. Mental status is normal       Cranial Nerves:  Pupils 3 mm, reactive. EOMI. Face is symmetric. Tongue and  uvula are midline. Other CN are normal           Motor:  Left weakness, right hemiplegia with contractures. Slightly increased tone on the right           Reflexes:  Normal DTR. Toes equivocal.        Sensory:  Unable to assess         Coordination:   Intact finger-to-nose on the left, unable to assess on the right       Gait:  Unable to assess  Neck: no nuchal rigidity, normal thyroid. No carotid bruits.    Cardiovascular: Regular rate and rhythm, no m/r/g  Lungs: Clear to auscultation  Abdomen: Soft, not tender, not distended  Extremities: No clubbing, no cyanosis, no edema    Data   ______________________________________________  All Data personally reviewed:       Labs:   CBC RESULTS:     Recent Labs  Lab 09/18/18  0917 09/17/18  0815 09/16/18  0510   WBC 11.1* 10.1 18.8*   RBC 4.34* 3.28* 3.72*   HGB 13.2* 9.7* 11.1*   HCT 39.0* 29.2* 32.5*    133* 154     Basic Metabolic Panel:   Recent Labs   Lab Test  09/18/18   0917  09/17/18   0815  09/16/18   0510   NA  147*  142  135   POTASSIUM  3.1*  3.5  5.1   CHLORIDE  111*  107  101   CO2  30  28  28   BUN  11  10  11   CR  0.91  0.87  0.85   GLC  128*  121*  142*   STEVE  8.8  8.2*  8.0*     Liver panel:  Recent Labs   Lab Test  09/15/18   2055  09/11/18   1202  09/03/18   0822  08/09/18   1403  07/05/18   0021   PROTTOTAL  7.0  8.4  6.6*  7.8  7.8   ALBUMIN  3.1*  3.7  2.8*  3.3*  3.3*   BILITOTAL  0.3  0.3  0.3  0.4  0.5   ALKPHOS  94  109  79  100  96   AST  14  24  18  21  17   ALT  24  38  22  27  28     INR:  Recent Labs   Lab Test  02/07/17   0452  01/30/17   0340  01/25/17   0426  01/23/17   1720  01/22/17   1003   INR  1.27*  1.32*  1.49*  1.53*  2.58*      Lipid Profile:  Recent Labs   Lab Test  11/29/16   0430   TRIG  100     A1C:   Recent Labs   Lab Test  02/15/18   0610  11/24/16   0405  08/24/16   0425   A1C  6.6*  5.1  5.8     Troponin I:   Recent Labs   Lab Test  01/22/17   0500  01/21/17   2348  01/21/17   1600  11/27/16   0400  11/26/16    2234  11/26/16   1150  11/26/16   1024  11/26/16   0745   TROPI  0.017  0.025  0.028  0.789*  1.282*  0.593*  0.434*  0.017     Ammonia:   Recent Labs   Lab Test  12/01/16   1015   KAMLA  20     CK:   Recent Labs   Lab Test  11/23/16   1525  12/14/11   0755   CKT  101  38*        CRP inflammation:   Recent Labs   Lab Test  02/06/17   0228  01/30/17   0340  01/25/17   0426   CRP  87.0*  180.0*  170.0*     UA Results:  Recent Labs   Lab Test  09/15/18   2126   10/03/17   2224   COLOR  Yellow   < >  Yellow   APPEARANCE  Clear   < >  Clear   URINEGLC  Negative   < >  Negative   URINEBILI  Negative   < >  Negative   URINEKETONE  Negative   < >  Negative   SG  1.016   < >  1.015   UBLD  Negative   < >  Negative   URINEPH  8.0*   < >  7.5*   PROTEIN  10*   < >  30*   UROBILINOGEN   --    --   0.2   NITRITE  Negative   < >  Negative   LEUKEST  Negative   < >  Negative   RBCU  <1   < >  O - 2   WBCU  2   < >  O - 2    < > = values in this interval not displayed.     Most Recent 6 Bacteria Isolates From Any Culture (See EPIC Reports for Culture Details):  Recent Labs   Lab Test  09/18/18   1308  09/17/18   1433  09/15/18   2126  09/15/18   2055  09/15/18   2050  09/02/18   0123   CULT  No growth after 1 hour  No growth after 21 hours  No growth  Cultured on the 2nd day of incubation:  Staphylococcus epidermidis  *  Critical Value/Significant Value, preliminary result only, called to and read back by  CEDRICK SPICER RN @0010 9/17/18. SCG    Susceptibility testing done on previous specimen  Cultured on the 1st day of incubation:  Staphylococcus epidermidis  *  Critical Value/Significant Value, preliminary result only, called to and read back by  Edgar Vidales RN at 2319 on 9/16/18 by FELTON.    (Note)  POSITIVE for STAPHYLOCOCCUS EPIDERMIDIS and POSITIVE for the mecA  gene (resistant to methicillin) by [employer] multiplex nucleic acid  test. Final identification and antimicrobial susceptibility testing  will be verified by  standard methods.    Specimen tested with [employer] multiplex, gram-positive blood culture  nucleic acid test for the following targets: Staph aureus, Staph  epidermidis, Staph lugdunensis, other Staph species, Enterococcus  faecalis, Enterococcus faecium, Streptococcus species, S. agalactiae,  S. anginosus grp., S. pneumoniae, S. pyogenes, Listeria sp., mecA  (methicillin resistance) and Alberto/B (vancomycin resistance).    Critical Value/Significant Value called to and read back by CEDRICK SPICER RN @0206 9/17/18. SCG      No growth        Cardiac US:   --       Neurophysiology:   --       Imaging:   No neuroimaging this admission      Text Page    Vee Augustin, MSN, FNP-BC, RN CNRN

## 2018-09-18 NOTE — CONSULTS
Care Transition Initial Assessment - RN        Met with: Patient and then called his Mother Savannah yesterday.  His mother is also primary caregiver and Guardian.  DATA   Active Problems:    Sepsis (H)    Aspiration pneumonia (H)       Cognitive Status: awake and alert, unable to assess orientation        Contact information and PCP information verified: Yes  Lives With: parent(s)        Insurance concerns: No Insurance issues identified  ASSESSMENT  Patient currently receives the following services:  Pt has four different nurses through [facility] that do 12 hr alternating shifts and then pt has a PCA during the night hours through [facility].  [employer] is one of pt's PCAs.  Pt is on daytime TF and product is through [email or web address].        Identified issues/concerns regarding health management:  Pt has +blood cultures.  Will await further antibiotic recommendations from ID.  Savannah plans for pt to transition back home.  She noted there was no way he was going to another nursing home setting.    PLAN  Financial costs for the patient include NA  Patient anticipates discharging to: Home        Patient anticipates needs for home equipment: Does not know, await ID recommendations  Plan/Disposition: Home   Appointments:     Will schedule once we are closer to hospital discharge.  Care  (CTS) will continue to follow as needed.

## 2018-09-18 NOTE — CODE/RAPID RESPONSE
Brief house [facility] note:    Nursing staff unable to obtain IV access x multiple attempts by multiple nurses (including ICU nursing staff). I placed a 22G IV placed in the left forearm.     Please page me with any concerns related to his vascular access.    NATHANIEL Cano, CNP  Hospitalist - House [facility]  Text Page  [phone])

## 2018-09-18 NOTE — PROVIDER NOTIFICATION
MD Notification    Notified Person: MD    Notified Person Name: ALBERT Shaw    Notification Date/Time: 9/18/18 1008    Notification Interaction: Paged MD    Purpose of Notification: Critical lab value, carbamazepine level 16.0    Orders Received: Spoke with MD, will place neuro consult    Comments:
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
## 2018-09-18 NOTE — PHARMACY-VANCOMYCIN DOSING SERVICE
Pharmacy Vancomycin Note  Date of Service 2018  Patient's  1960   58 year old, male    Indication: Sepsis  Goal Trough Level: 15-20 mg/L  Day of Therapy: 3  Current Vancomycin regimen:  1000 mg IV q8h    Current estimated CrCl = Estimated Creatinine Clearance: 96.9 mL/min (based on Cr of 0.91).    Creatinine for last 3 days  9/15/2018:  8:55 PM Creatinine 0.76 mg/dL  2018:  5:10 AM Creatinine 0.85 mg/dL  2018:  8:15 AM Creatinine 0.87 mg/dL  2018:  9:17 AM Creatinine 0.91 mg/dL    Recent Vancomycin Levels (past 3 days)  2018: 10:40 AM Vancomycin Level Canceled, Test credited mg/L;  1:17 PM Vancomycin Level 20.3 mg/L  2018:  1:08 PM Vancomycin Level 27.2 mg/L    Vancomycin IV Administrations (past 72 hours)                   vancomycin (VANCOCIN) 1000 mg in dextrose 5% 200 mL PREMIX (mg) 1,000 mg New Bag 18 0620     1,000 mg New Bag 18 2202     1,000 mg New Bag  1312     1,000 mg New Bag  0541     1,000 mg New Bag 18 2112     1,000 mg New Bag  1103                Nephrotoxins and other renal medications (Future)    Start     Dose/Rate Route Frequency Ordered Stop    18 2200  vancomycin (VANCOCIN) 1,250 mg in sodium chloride 0.9 % 250 mL intermittent infusion      1,250 mg  over 90 Minutes Intravenous EVERY 12 HOURS 18 1403      18 1200  piperacillin-tazobactam (ZOSYN) 4.5 g vial to attach to  mL bag     Comments:  Pharmacy can adjust dose based on renal function.    4.5 g  over 30 Minutes Intravenous EVERY 6 HOURS 18 0804               Contrast Orders - past 72 hours     None          Interpretation of levels and current regimen:  Trough level is  Supratherapeutic    Has serum creatinine changed > 50% in last 72 hours: No    Urine output:  unable to determine    Renal Function: Worsening    Plan:  1.  Decrease Dose to 1.25 gm q12h  2.  Pharmacy will check trough levels as appropriate in 1-3 Days.    3. Serum creatinine  levels will be ordered daily for the first week of therapy and at least twice weekly for subsequent weeks.      Gary Singh        .

## 2018-09-18 NOTE — PLAN OF CARE
Problem: Patient Care Overview  Goal: Plan of Care/Patient Progress Review  Discharge Planner PT   Patient plan for discharge: -  Current status: Orders received and chart reviewed. Pt familiar from previous admits. 59 yo male adm with fever, rigors, increased oral secretions, had a recent admit for aspiration pna. PMH includes TBI. The patient is dependent for mobility, ADLs and self cares. Pt receives Home cares including PT/OT/SLP/RN and PCA services. Patient lives with his mother in a handicap accessible home. Pt would benefit from transferring up to chair with assist from nursing with use of ceiling lift and sling and frequent position changes for pressure relief.   Barriers to return to prior living situation: None indicated.   Recommendations for discharge: Return home with assist from mother and Home cares.   Rationale for recommendations: No IP PT needs indicated as pt is at baseline in regards to mobility.       Entered by: Sue Almendarez 09/18/2018 12:09 PM

## 2018-09-19 ENCOUNTER — ANESTHESIA (OUTPATIENT)
Dept: MEDSURG UNIT | Facility: CLINIC | Age: 58
DRG: 871 | End: 2018-09-19
Payer: MEDICARE

## 2018-09-19 ENCOUNTER — ANESTHESIA EVENT (OUTPATIENT)
Dept: MEDSURG UNIT | Facility: CLINIC | Age: 58
DRG: 871 | End: 2018-09-19
Payer: MEDICARE

## 2018-09-19 LAB
ANION GAP SERPL CALCULATED.3IONS-SCNC: 7 MMOL/L (ref 3–14)
BACTERIA SPEC CULT: ABNORMAL
BASOPHILS # BLD AUTO: 0.1 10E9/L (ref 0–0.2)
BASOPHILS NFR BLD AUTO: 0.7 %
BUN SERPL-MCNC: 13 MG/DL (ref 7–30)
CALCIUM SERPL-MCNC: 8.7 MG/DL (ref 8.5–10.1)
CARBAMAZEPINE SERPL-MCNC: 10 MG/L (ref 4–12)
CHLORIDE SERPL-SCNC: 112 MMOL/L (ref 94–109)
CO2 SERPL-SCNC: 31 MMOL/L (ref 20–32)
CREAT SERPL-MCNC: 1.04 MG/DL (ref 0.66–1.25)
DIFFERENTIAL METHOD BLD: ABNORMAL
EOSINOPHIL # BLD AUTO: 0.3 10E9/L (ref 0–0.7)
EOSINOPHIL NFR BLD AUTO: 3.6 %
ERYTHROCYTE [DISTWIDTH] IN BLOOD BY AUTOMATED COUNT: 13.8 % (ref 10–15)
GFR SERPL CREATININE-BSD FRML MDRD: 74 ML/MIN/1.7M2
GLUCOSE BLDC GLUCOMTR-MCNC: 84 MG/DL (ref 70–99)
GLUCOSE SERPL-MCNC: 137 MG/DL (ref 70–99)
HCT VFR BLD AUTO: 39.7 % (ref 40–53)
HGB BLD-MCNC: 12.9 G/DL (ref 13.3–17.7)
IMM GRANULOCYTES # BLD: 0 10E9/L (ref 0–0.4)
IMM GRANULOCYTES NFR BLD: 0.4 %
LYMPHOCYTES # BLD AUTO: 2.8 10E9/L (ref 0.8–5.3)
LYMPHOCYTES NFR BLD AUTO: 36 %
Lab: ABNORMAL
MCH RBC QN AUTO: 29.5 PG (ref 26.5–33)
MCHC RBC AUTO-ENTMCNC: 32.5 G/DL (ref 31.5–36.5)
MCV RBC AUTO: 91 FL (ref 78–100)
MONOCYTES # BLD AUTO: 0.8 10E9/L (ref 0–1.3)
MONOCYTES NFR BLD AUTO: 10.7 %
NEUTROPHILS # BLD AUTO: 3.7 10E9/L (ref 1.6–8.3)
NEUTROPHILS NFR BLD AUTO: 48.6 %
NRBC # BLD AUTO: 0 10*3/UL
NRBC BLD AUTO-RTO: 0 /100
PLATELET # BLD AUTO: 209 10E9/L (ref 150–450)
POTASSIUM SERPL-SCNC: 3.4 MMOL/L (ref 3.4–5.3)
RBC # BLD AUTO: 4.37 10E12/L (ref 4.4–5.9)
SODIUM SERPL-SCNC: 150 MMOL/L (ref 133–144)
SPECIMEN SOURCE: ABNORMAL
VANCOMYCIN SERPL-MCNC: 25.1 MG/L
WBC # BLD AUTO: 7.7 10E9/L (ref 4–11)

## 2018-09-19 PROCEDURE — 25000132 ZZH RX MED GY IP 250 OP 250 PS 637: Mod: GY | Performed by: INTERNAL MEDICINE

## 2018-09-19 PROCEDURE — 25000132 ZZH RX MED GY IP 250 OP 250 PS 637: Mod: GY | Performed by: NURSE PRACTITIONER

## 2018-09-19 PROCEDURE — 99232 SBSQ HOSP IP/OBS MODERATE 35: CPT | Performed by: NURSE PRACTITIONER

## 2018-09-19 PROCEDURE — 80202 ASSAY OF VANCOMYCIN: CPT | Performed by: INTERNAL MEDICINE

## 2018-09-19 PROCEDURE — 36415 COLL VENOUS BLD VENIPUNCTURE: CPT | Performed by: INTERNAL MEDICINE

## 2018-09-19 PROCEDURE — 25000128 H RX IP 250 OP 636: Performed by: INTERNAL MEDICINE

## 2018-09-19 PROCEDURE — 36415 COLL VENOUS BLD VENIPUNCTURE: CPT | Performed by: PHYSICIAN ASSISTANT

## 2018-09-19 PROCEDURE — 12000000 ZZH R&B MED SURG/OB

## 2018-09-19 PROCEDURE — 80156 ASSAY CARBAMAZEPINE TOTAL: CPT | Performed by: PHYSICIAN ASSISTANT

## 2018-09-19 PROCEDURE — 99207 ZZC CDG-MDM COMPONENT: MEETS MODERATE - UP CODED: CPT | Performed by: INTERNAL MEDICINE

## 2018-09-19 PROCEDURE — A9270 NON-COVERED ITEM OR SERVICE: HCPCS | Mod: GY | Performed by: INTERNAL MEDICINE

## 2018-09-19 PROCEDURE — 80048 BASIC METABOLIC PNL TOTAL CA: CPT | Performed by: PHYSICIAN ASSISTANT

## 2018-09-19 PROCEDURE — 25000132 ZZH RX MED GY IP 250 OP 250 PS 637: Mod: GY | Performed by: PHYSICIAN ASSISTANT

## 2018-09-19 PROCEDURE — 25000125 ZZHC RX 250: Performed by: INTERNAL MEDICINE

## 2018-09-19 PROCEDURE — 27210429 ZZH NUTRITION PRODUCT INTERMEDIATE LITER

## 2018-09-19 PROCEDURE — A9270 NON-COVERED ITEM OR SERVICE: HCPCS | Mod: GY | Performed by: PHYSICIAN ASSISTANT

## 2018-09-19 PROCEDURE — 00000146 ZZHCL STATISTIC GLUCOSE BY METER IP

## 2018-09-19 PROCEDURE — 40000671 ZZH STATISTIC ANESTHESIA CASE

## 2018-09-19 PROCEDURE — 85025 COMPLETE CBC W/AUTO DIFF WBC: CPT | Performed by: PHYSICIAN ASSISTANT

## 2018-09-19 PROCEDURE — A9270 NON-COVERED ITEM OR SERVICE: HCPCS | Mod: GY | Performed by: NURSE PRACTITIONER

## 2018-09-19 PROCEDURE — 25000125 ZZHC RX 250: Performed by: PHYSICIAN ASSISTANT

## 2018-09-19 PROCEDURE — 99233 SBSQ HOSP IP/OBS HIGH 50: CPT | Performed by: INTERNAL MEDICINE

## 2018-09-19 RX ORDER — SCOLOPAMINE TRANSDERMAL SYSTEM 1 MG/1
1 PATCH, EXTENDED RELEASE TRANSDERMAL
Status: DISCONTINUED | OUTPATIENT
Start: 2018-09-19 | End: 2018-09-21 | Stop reason: HOSPADM

## 2018-09-19 RX ORDER — CARBAMAZEPINE 100 MG/5ML
125 SUSPENSION ORAL EVERY 6 HOURS SCHEDULED
Status: DISCONTINUED | OUTPATIENT
Start: 2018-09-19 | End: 2018-09-21 | Stop reason: HOSPADM

## 2018-09-19 RX ADMIN — POTASSIUM & SODIUM PHOSPHATES POWDER PACK 280-160-250 MG 1 PACKET: 280-160-250 PACK at 21:48

## 2018-09-19 RX ADMIN — HYDROCORTISONE 15 MG: 10 TABLET ORAL at 09:03

## 2018-09-19 RX ADMIN — PIPERACILLIN SODIUM,TAZOBACTAM SODIUM 4.5 G: 4; .5 INJECTION, POWDER, FOR SOLUTION INTRAVENOUS at 12:50

## 2018-09-19 RX ADMIN — PIPERACILLIN SODIUM,TAZOBACTAM SODIUM 4.5 G: 4; .5 INJECTION, POWDER, FOR SOLUTION INTRAVENOUS at 18:00

## 2018-09-19 RX ADMIN — LEVOTHYROXINE SODIUM 137 MCG: 137 TABLET ORAL at 09:02

## 2018-09-19 RX ADMIN — CARBAMAZEPINE 125 MG: 100 SUSPENSION ORAL at 23:47

## 2018-09-19 RX ADMIN — SCOPALAMINE 1 PATCH: 1 PATCH, EXTENDED RELEASE TRANSDERMAL at 13:54

## 2018-09-19 RX ADMIN — POTASSIUM & SODIUM PHOSPHATES POWDER PACK 280-160-250 MG 1 PACKET: 280-160-250 PACK at 15:32

## 2018-09-19 RX ADMIN — CARBAMAZEPINE 125 MG: 100 SUSPENSION ORAL at 17:55

## 2018-09-19 RX ADMIN — PANTOPRAZOLE SODIUM 20 MG: 40 TABLET, DELAYED RELEASE ORAL at 09:06

## 2018-09-19 RX ADMIN — PIPERACILLIN SODIUM,TAZOBACTAM SODIUM 4.5 G: 4; .5 INJECTION, POWDER, FOR SOLUTION INTRAVENOUS at 01:05

## 2018-09-19 RX ADMIN — BRIVARACETAM 100 MG: 10 SOLUTION ORAL at 09:03

## 2018-09-19 RX ADMIN — CARBAMAZEPINE 125 MG: 100 SUSPENSION ORAL at 12:50

## 2018-09-19 RX ADMIN — VANCOMYCIN HYDROCHLORIDE 1250 MG: 10 INJECTION, POWDER, LYOPHILIZED, FOR SOLUTION INTRAVENOUS at 10:38

## 2018-09-19 RX ADMIN — MULTIVITAMIN 15 ML: LIQUID ORAL at 09:03

## 2018-09-19 RX ADMIN — POTASSIUM & SODIUM PHOSPHATES POWDER PACK 280-160-250 MG 1 PACKET: 280-160-250 PACK at 09:03

## 2018-09-19 RX ADMIN — BRIVARACETAM 100 MG: 10 SOLUTION ORAL at 21:48

## 2018-09-19 RX ADMIN — HYDROCORTISONE 10 MG: 10 TABLET ORAL at 21:48

## 2018-09-19 RX ADMIN — Medication 250 MG: at 09:02

## 2018-09-19 RX ADMIN — Medication 250 MG: at 21:48

## 2018-09-19 RX ADMIN — PIPERACILLIN SODIUM,TAZOBACTAM SODIUM 4.5 G: 4; .5 INJECTION, POWDER, FOR SOLUTION INTRAVENOUS at 06:51

## 2018-09-19 ASSESSMENT — ACTIVITIES OF DAILY LIVING (ADL)
ADLS_ACUITY_SCORE: 40

## 2018-09-19 NOTE — PROGRESS NOTES
[facility]    Infectious Disease Progress Note    Date of Service (when I saw the patient): 09/19/2018     Assessment & Plan   Keyon Farias is a 58 year old male who was admitted on 9/15/2018.     Impression:  1. 58 y.o male with TBI.   2. Right sided hemiparesis.   3. Contractures.   4. Recurrent pneumonia.   5. PEG tube.   6. Admitted with fevers, all the regular symptoms of aspiration pneumonia.   7. 2/2 cultures positive for staph epi. No intravascular devices.      Recommendations;   Continue vancomycin, do follow up blood cultures, avoid any longterm IV lines. So far repeat blood cultures are negative for 2 days.   Continue on zosyn.       Senait Orona MD    Interval History   He is afebrile   Cultures as above        Physical Exam   Temp: 97.4  F (36.3  C) Temp src: Oral BP: 94/54   Heart Rate: 104 Resp: 18 SpO2: 99 % O2 Device: None (Room air)    Vitals:    09/17/18 0724 09/18/18 0702 09/19/18 0709   Weight: 77.5 kg (170 lb 13.7 oz) 75.6 kg (166 lb 10.7 oz) 74.9 kg (165 lb 2 oz)     Vital Signs with Ranges  Temp:  [97.4  F (36.3  C)-98.4  F (36.9  C)] 97.4  F (36.3  C)  Heart Rate:  [] 104  Resp:  [16-18] 18  BP: ()/(50-63) 94/54  SpO2:  [97 %-99 %] 99 %    Constitutional: non verbal, not in distress   Lungs: Clear to auscultation bilaterally, no crackles or wheezing  Cardiovascular: Regular rate and rhythm, normal S1 and S2, and no murmur noted  Abdomen: Normal bowel sounds, soft, non-distended, non-tender, PEG tube in place  Skin: No rashes, no cyanosis, no edema  Other:    Medications     IV fluid REPLACEMENT ONLY         Brivaracetam  100 mg Per PEG tube BID     carBAMazepine  125 mg Oral Q6H ALONDRA     hydrocortisone  10 mg Oral QPM     hydrocortisone  15 mg Oral QAM     [START ON 9/20/2018] influenza vaccine adult (product based on age)  0.5 mL Intramuscular Prior to discharge     levothyroxine  137 mcg Per Feeding Tube Daily     lidocaine 2 %  10 mL Urethral Once      multivitamins with minerals  15 mL Per Feeding Tube Daily     pantoprazole  20 mg Oral QAM     piperacillin-tazobactam  4.5 g Intravenous Q6H     potassium & sodium phosphates  1 packet Per Feeding Tube TID     saccharomyces boulardii  250 mg Oral or Feeding Tube BID     scopolamine  1 patch Transdermal Q72H    And     scopolamine   Transdermal Q8H    And     [START ON 9/22/2018] scopolamine   Transdermal Q72H     sodium chloride (PF)  3 mL Intracatheter Q8H     vancomycin (VANCOCIN) IV  1,250 mg Intravenous Q12H       Data   All microbiology laboratory data reviewed.  Recent Labs   Lab Test  09/19/18   0818 09/18/18   0917  09/17/18   0815   WBC  7.7  11.1*  10.1   HGB  12.9*  13.2*  9.7*   HCT  39.7*  39.0*  29.2*   MCV  91  90  89   PLT  209  169  133*     Recent Labs   Lab Test  09/19/18   0818  09/18/18   0917  09/17/18   0815   CR  1.04  0.91  0.87     No lab results found.  Recent Labs   Lab Test  09/18/18   1715  09/18/18   1308  09/17/18   1433  09/15/18   2126  09/15/18   2055  09/15/18   2050  09/02/18   0123  09/02/18   0100  09/01/18   2330   CULT  No growth after 8 hours  No growth after 14 hours  No growth after 2 days  No growth  Cultured on the 2nd day of incubation:  Staphylococcus epidermidis  *  Critical Value/Significant Value, preliminary result only, called to and read back by  CEDRICK SPICER RN @0010 9/17/18. SCG    Susceptibility testing done on previous specimen  Cultured on the 1st day of incubation:  Staphylococcus epidermidis  *  Critical Value/Significant Value, preliminary result only, called to and read back by  Edgar Vidales RN at 2319 on 9/16/18 by FELTON.    (Note)  POSITIVE for STAPHYLOCOCCUS EPIDERMIDIS and POSITIVE for the mecA  gene (resistant to methicillin) by [employer] nucleic acid  test. Final identification and antimicrobial susceptibility testing  will be verified by standard methods.    Specimen tested with [employer] multiplex, gram-positive blood  culture  nucleic acid test for the following targets: Staph aureus, Staph  epidermidis, Staph lugdunensis, other Staph species, Enterococcus  faecalis, Enterococcus faecium, Streptococcus species, S. agalactiae,  S. anginosus grp., S. pneumoniae, S. pyogenes, Listeria sp., mecA  (methicillin resistance) and Alberto/B (vancomycin resistance).    Critical Value/Significant Value called to and read back by CEDRICK SPICER RN @0206 9/17/18. SCG      No growth  No growth  No growth
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
## 2018-09-19 NOTE — PLAN OF CARE
Problem: Patient Care Overview  Goal: Plan of Care/Patient Progress Review  Outcome: Improving  Alert, nonverbal, nod yes/no. VSS on RA. Tele: ST. Up with lift, turn/reposition. No nonverbal s/s of pain, nausea, SOB. TF ran from 0516-4345. PEG site CDI. Scopalamine patch in place. IV left foot patent, SL. Continue IV abx vanco/zosyn. Lerma patent wit good UOP. Patient refuses oral cares however with do himself with his toothbrush. Coccyx red, barrier applied. BM x2, incontinent. Nursing will continue to monitor.

## 2018-09-19 NOTE — PLAN OF CARE
Problem: Patient Care Overview  Goal: Plan of Care/Patient Progress Review  Outcome: Improving  Pt nonverbal baseline. HECTOR orientation. VSS on RA. Turn/repo q2. Blanchable redness to coccyx. Barrier cream applied. 3x watery stools, C-Diff sample sent. Enteric precautions initiated. Pt denies pain. NPO. TF stopped at 1900. PEG dressing CD&I. Maria Guadalupe patent. K+ recheck 4.6. Neuro consulted for elevated tegretol level -- tegretol held and recheck level in AM. Tele sinus hector. Contact precautions maintained. Nursing continue to monitor.

## 2018-09-19 NOTE — PROGRESS NOTES
[facility] [place]  Neurology Daily Note      Admission Date:9/15/2018   Date of service: 09/19/2018   Hospital Day: 5      Assessment & Plan   _______________________________  #. (R56.9) Seizures (H)  --complicated seizure history  --PTA on carbamazepine 150 mg q6h & brivaracetam 100 mg BID  --history of elevated carbamazepine levels while on antibiotics  -----currently admitted for pneumonia/sepsis on multiple antibiotics  --held carbamazepine 9/18   -----as in the past, will likely need decreased dose of carbamazepine while on antibiotics  ---recheck level on 9/19 is 10, back in therapeutic range  -----restart carbamazepine at 125 mg q6h while on antibiotics  -----recheck level tomorrow morning  -------dose will need to be readjusted to baseline dose once off antibiotics  #. (J18.9) Pneumonia due to infectious organism, unspecified laterality, unspecified part of lung  (primary encounter diagnosis)  --management per primary service  --on multiple antibiotics  #. (A41.9,  R65.21) Septic shock (H)  --management per primary service  --on multiple antibiotics  #. DVT Prophylaxis  --per primary service    Code Status: Full Code    Disposition: pending     Interval History   _______________________________  Patient presented on 9/16/18 with fever, rigors, increased oral secretions. Complicated medical history with TBI, seizures, right hemiparesis. Patient is nonverbal at baseline. Found to be septic on admission and started on antibiotics. Historically, patient has had high carbamazepine levels while on antibiotics, and was again found to be supratherapeutic this admission. Held carbamazepine yesterday. This morning's level back in therapeutic range. Will start carbamazepine slightly lower dose while on antibiotics. Recheck level in the morning.     Review of Systems   _______________________________  Review of systems is limited by patient factors - patient is nonverbal  Physical Exam    _______________________________  Vitals: Temp: 97.4  F (36.3  C) Temp src: Oral BP: 94/54   Heart Rate: 104 Resp: 18 SpO2: 99 % O2 Device: None (Room air)    Vital Signs with Ranges: Temp:  [97.4  F (36.3  C)-98.4  F (36.9  C)] 97.4  F (36.3  C)  Heart Rate:  [] 104  Resp:  [16-18] 18  BP: ()/(50-63) 94/54  SpO2:  [97 %-99 %] 99 %    General Appearance:  No acute distress  Neuro:       Mental Status Exam:   Awake, alert, unable to assess orientation. Nonverbal at baseline, nods/shakes head and thumbs up to communicate. Mental status is normal       Cranial Nerves:  Pupils 3 mm, reactive. EOMI. Face is symmetric. Tongue and uvula are midline. Other CN are normal           Motor:  Left weakness, right hemiplegia with contractures. Slightly increased tone on the right           Reflexes:  Normal DTR. Toes equivocal.        Sensory:  Unable to assess         Coordination:   Intact finger-to-nose on the left, unable to assess on the right       Gait:  Unable to assess  Abdomen: Soft, not tender, not distended  Extremities: No clubbing, no cyanosis, no edema    Medications   _______________________________    IV fluid REPLACEMENT ONLY         Brivaracetam  100 mg Per PEG tube BID     carBAMazepine  125 mg Oral Q6H ALONDRA     hydrocortisone  10 mg Oral QPM     hydrocortisone  15 mg Oral QAM     levothyroxine  137 mcg Per Feeding Tube Daily     lidocaine 2 %  10 mL Urethral Once     multivitamins with minerals  15 mL Per Feeding Tube Daily     pantoprazole  20 mg Oral QAM     piperacillin-tazobactam  4.5 g Intravenous Q6H     potassium & sodium phosphates  1 packet Per Feeding Tube TID     saccharomyces boulardii  250 mg Oral or Feeding Tube BID     sodium chloride (PF)  3 mL Intracatheter Q8H     vancomycin (VANCOCIN) IV  1,250 mg Intravenous Q12H       Data   _______________________________      Lab Data:   All data was reviewed by me personally  CBC RESULTS:  Recent Labs   Lab Test  09/19/18   0818  09/18/18    0917  18   0815   WBC  7.7  11.1*  10.1   RBC  4.37*  4.34*  3.28*   HGB  12.9*  13.2*  9.7*   HCT  39.7*  39.0*  29.2*   PLT  209  169  133*     Basic Metabolic Panel:  Recent Labs   Lab Test  18   0818  18   1715  18   0917  18   0815   NA  150*   --   147*  142   POTASSIUM  3.4  4.6  3.1*  3.5   CHLORIDE  112*   --   111*  107   CO2  31   --   30  28   BUN  13   --   11  10   CR  1.04   --   0.91  0.87   GLC  137*   --   128*  121*   STEVE  8.7   --   8.8  8.2*     Liver panel:  Recent Labs   Lab Test  09/15/18   2055  18   1202  18   0822  18   1403  18   0021   PROTTOTAL  7.0  8.4  6.6*  7.8  7.8   ALBUMIN  3.1*  3.7  2.8*  3.3*  3.3*   BILITOTAL  0.3  0.3  0.3  0.4  0.5   ALKPHOS  94  109  79  100  96   AST  14  24  18  21  17   ALT  24  38  22  27  28     Coagulation  Recent Labs   Lab Test  17   0452  17   0340  17   0426  17   1720  17   1003   16   0400   16   0407  16   0440  16   0525  16   0320  16   1510   16   0708  16   2106  11/15/15   1137   INR  1.27*  1.32*  1.49*  1.53*  2.58*   < >  1.30*   < >   --    --    --    --    --    --   1.31*  1.10  1.23*   PTT   --    --    --    --   52*   --   36   --    --    --    --    --    --    --   44*  29  34   AXA   --    --    --    --    --    --    --    --   <0.10  Therapeutic Range:     UFH:   0.15-0.35 IU/mL for low              intensity dosing            0.30-0.70 IU/mL for high              intensity dosing for              DVT and PE     Enoxaparin: If administered              once daily with dose              1.5mg/k.0-2.0 IU/mL                 If administered              twice daily with dose              1mg/k.60-1.0 IU/mL    <0.10  Therapeutic Range:     UFH:   0.15-0.35 IU/mL for low              intensity dosing            0.30-0.70 IU/mL for high              intensity dosing for              DVT and  PE     Enoxaparin: If administered              once daily with dose              1.5mg/k.0-2.0 IU/mL                 If administered              twice daily with dose              1mg/k.60-1.0 IU/mL    <0.10  Therapeutic Range:     UFH:   0.15-0.35 IU/mL for low              intensity dosing            0.30-0.70 IU/mL for high              intensity dosing for              DVT and PE     Enoxaparin: If administered              once daily with dose              1.5mg/k.0-2.0 IU/mL                 If administered              twice daily with dose              1mg/k.60-1.0 IU/mL    0.12  0.28   < >   --    --    --     < > = values in this interval not displayed.      Lipid Profile:  Recent Labs   Lab Test  16   0430   TRIG  100     Thyroid Panel:  Recent Labs   Lab Test  18   0021  17   0403  16   0400  16   1003  16   2000   16   1450   TSH  <0.01*   --   <0.01*   --   <0.01*   < >  <0.01*   T4  1.66*  0.54*  0.73*  0.77   --    < >  0.91   FT3   --    --    --   0.9*   --    --   1.7*    < > = values in this interval not displayed.      Vitamin B12:   Recent Labs   Lab Test  16   1450   B12  549      Vitamin D level:   Recent Labs   Lab Test  16   1450   VITDT  49     A1C:   Recent Labs   Lab Test  02/15/18   0610  16   0405  16   0425   A1C  6.6*  5.1  5.8     Troponin I:   Recent Labs   Lab Test  17   0500  17   2348  17   1600  16   0400  16   2234  16   1150  16   1024  16   0745   TROPI  0.017  0.025  0.028  0.789*  1.282*  0.593*  0.434*  0.017     CK:   Recent Labs   Lab Test  16   1525  11   0755   CKT  101  38*       CRP inflammation:   Recent Labs   Lab Test  17   0228  17   0340  17   0426   CRP  87.0*  180.0*  170.0*     Ammonia:   Recent Labs   Lab Test  16   1015   KAMLA  20     UA Results:  Recent Labs   Lab Test  09/15/18   2126    10/03/17   2224   COLOR  Yellow   < >  Yellow   APPEARANCE  Clear   < >  Clear   URINEGLC  Negative   < >  Negative   URINEBILI  Negative   < >  Negative   URINEKETONE  Negative   < >  Negative   SG  1.016   < >  1.015   UBLD  Negative   < >  Negative   URINEPH  8.0*   < >  7.5*   PROTEIN  10*   < >  30*   UROBILINOGEN   --    --   0.2   NITRITE  Negative   < >  Negative   LEUKEST  Negative   < >  Negative   RBCU  <1   < >  O - 2   WBCU  2   < >  O - 2    < > = values in this interval not displayed.        Cardiac US:   --     Neurophysiology:   --     Imaging:   No neuroimaging this admission      Text Page    Vee Augustin, MSN, FNP-BC, RN CNRN

## 2018-09-19 NOTE — PLAN OF CARE
Problem: Patient Care Overview  Goal: Plan of Care/Patient Progress Review  Outcome: Improving  Patient is alert, nonverbal, able to nod yes or no to some questions. VSS on room air except bradycardic at times. Tele NSR. Blood glucose 84. NPO, TF to be started at 0700 this morning. PIV in left foot is patent, continues on IV antibiotics. Blanchable redness to coccyx, turning & repositioning. C diff negative, removed from enteric precautions, contact precautions maintained.

## 2018-09-20 LAB
ANION GAP SERPL CALCULATED.3IONS-SCNC: 7 MMOL/L (ref 3–14)
BASOPHILS # BLD AUTO: 0.1 10E9/L (ref 0–0.2)
BASOPHILS NFR BLD AUTO: 0.8 %
BUN SERPL-MCNC: 14 MG/DL (ref 7–30)
CALCIUM SERPL-MCNC: 8.1 MG/DL (ref 8.5–10.1)
CARBAMAZEPINE SERPL-MCNC: 10.4 MG/L (ref 4–12)
CHLORIDE SERPL-SCNC: 109 MMOL/L (ref 94–109)
CO2 SERPL-SCNC: 31 MMOL/L (ref 20–32)
CREAT SERPL-MCNC: 1.1 MG/DL (ref 0.66–1.25)
DIFFERENTIAL METHOD BLD: ABNORMAL
EOSINOPHIL # BLD AUTO: 0.5 10E9/L (ref 0–0.7)
EOSINOPHIL NFR BLD AUTO: 5.7 %
ERYTHROCYTE [DISTWIDTH] IN BLOOD BY AUTOMATED COUNT: 13.6 % (ref 10–15)
GFR SERPL CREATININE-BSD FRML MDRD: 69 ML/MIN/1.7M2
GLUCOSE BLDC GLUCOMTR-MCNC: 78 MG/DL (ref 70–99)
GLUCOSE SERPL-MCNC: 87 MG/DL (ref 70–99)
HCT VFR BLD AUTO: 38.5 % (ref 40–53)
HGB BLD-MCNC: 12.7 G/DL (ref 13.3–17.7)
IMM GRANULOCYTES # BLD: 0 10E9/L (ref 0–0.4)
IMM GRANULOCYTES NFR BLD: 0.3 %
LYMPHOCYTES # BLD AUTO: 2.9 10E9/L (ref 0.8–5.3)
LYMPHOCYTES NFR BLD AUTO: 32.3 %
MCH RBC QN AUTO: 29.6 PG (ref 26.5–33)
MCHC RBC AUTO-ENTMCNC: 33 G/DL (ref 31.5–36.5)
MCV RBC AUTO: 90 FL (ref 78–100)
MONOCYTES # BLD AUTO: 1.1 10E9/L (ref 0–1.3)
MONOCYTES NFR BLD AUTO: 12.5 %
NEUTROPHILS # BLD AUTO: 4.3 10E9/L (ref 1.6–8.3)
NEUTROPHILS NFR BLD AUTO: 48.4 %
NRBC # BLD AUTO: 0 10*3/UL
NRBC BLD AUTO-RTO: 0 /100
PLATELET # BLD AUTO: 194 10E9/L (ref 150–450)
POTASSIUM SERPL-SCNC: 3.4 MMOL/L (ref 3.4–5.3)
RBC # BLD AUTO: 4.29 10E12/L (ref 4.4–5.9)
SODIUM SERPL-SCNC: 147 MMOL/L (ref 133–144)
VANCOMYCIN SERPL-MCNC: 17.8 MG/L
WBC # BLD AUTO: 8.8 10E9/L (ref 4–11)

## 2018-09-20 PROCEDURE — 80156 ASSAY CARBAMAZEPINE TOTAL: CPT | Performed by: PHYSICIAN ASSISTANT

## 2018-09-20 PROCEDURE — 36415 COLL VENOUS BLD VENIPUNCTURE: CPT | Performed by: PHYSICIAN ASSISTANT

## 2018-09-20 PROCEDURE — 25000128 H RX IP 250 OP 636: Performed by: INTERNAL MEDICINE

## 2018-09-20 PROCEDURE — 99233 SBSQ HOSP IP/OBS HIGH 50: CPT | Performed by: INTERNAL MEDICINE

## 2018-09-20 PROCEDURE — 80048 BASIC METABOLIC PNL TOTAL CA: CPT | Performed by: PHYSICIAN ASSISTANT

## 2018-09-20 PROCEDURE — 25000132 ZZH RX MED GY IP 250 OP 250 PS 637: Mod: GY | Performed by: INTERNAL MEDICINE

## 2018-09-20 PROCEDURE — 90682 RIV4 VACC RECOMBINANT DNA IM: CPT | Performed by: INTERNAL MEDICINE

## 2018-09-20 PROCEDURE — 40000556 ZZH STATISTIC PERIPHERAL IV START W US GUIDANCE

## 2018-09-20 PROCEDURE — 25000125 ZZHC RX 250: Performed by: PHYSICIAN ASSISTANT

## 2018-09-20 PROCEDURE — 25000132 ZZH RX MED GY IP 250 OP 250 PS 637: Mod: GY | Performed by: NURSE PRACTITIONER

## 2018-09-20 PROCEDURE — 99231 SBSQ HOSP IP/OBS SF/LOW 25: CPT | Performed by: NURSE PRACTITIONER

## 2018-09-20 PROCEDURE — 80202 ASSAY OF VANCOMYCIN: CPT | Performed by: INTERNAL MEDICINE

## 2018-09-20 PROCEDURE — A9270 NON-COVERED ITEM OR SERVICE: HCPCS | Mod: GY | Performed by: INTERNAL MEDICINE

## 2018-09-20 PROCEDURE — 00000146 ZZHCL STATISTIC GLUCOSE BY METER IP

## 2018-09-20 PROCEDURE — 25000125 ZZHC RX 250: Performed by: INTERNAL MEDICINE

## 2018-09-20 PROCEDURE — 25000132 ZZH RX MED GY IP 250 OP 250 PS 637: Mod: GY | Performed by: PHYSICIAN ASSISTANT

## 2018-09-20 PROCEDURE — 27210429 ZZH NUTRITION PRODUCT INTERMEDIATE LITER

## 2018-09-20 PROCEDURE — 85025 COMPLETE CBC W/AUTO DIFF WBC: CPT | Performed by: PHYSICIAN ASSISTANT

## 2018-09-20 PROCEDURE — 12000000 ZZH R&B MED SURG/OB

## 2018-09-20 PROCEDURE — A9270 NON-COVERED ITEM OR SERVICE: HCPCS | Mod: GY | Performed by: NURSE PRACTITIONER

## 2018-09-20 PROCEDURE — A9270 NON-COVERED ITEM OR SERVICE: HCPCS | Mod: GY | Performed by: PHYSICIAN ASSISTANT

## 2018-09-20 RX ORDER — CEFAZOLIN SODIUM 1 G/50ML
1250 SOLUTION INTRAVENOUS
Status: DISCONTINUED | OUTPATIENT
Start: 2018-09-20 | End: 2018-09-20

## 2018-09-20 RX ORDER — AMOXICILLIN AND CLAVULANATE POTASSIUM 400; 57 MG/5ML; MG/5ML
10 POWDER, FOR SUSPENSION ORAL 2 TIMES DAILY
Status: DISCONTINUED | OUTPATIENT
Start: 2018-09-20 | End: 2018-09-21 | Stop reason: HOSPADM

## 2018-09-20 RX ADMIN — BRIVARACETAM 100 MG: 10 SOLUTION ORAL at 09:17

## 2018-09-20 RX ADMIN — MULTIVITAMIN 15 ML: LIQUID ORAL at 08:57

## 2018-09-20 RX ADMIN — SCOPALAMINE 1 PATCH: 1 PATCH, EXTENDED RELEASE TRANSDERMAL at 12:54

## 2018-09-20 RX ADMIN — CARBAMAZEPINE 125 MG: 100 SUSPENSION ORAL at 12:51

## 2018-09-20 RX ADMIN — INFLUENZA A VIRUS A/MICHIGAN/45/2015 (H1N1) RECOMBINANT HEMAGGLUTININ ANTIGEN, INFLUENZA A VIRUS A/SINGAPORE/INFIMH-16-0019/2016 (H3N2) RECOMBINANT HEMAGGLUTININ ANTIGEN, INFLUENZA B VIRUS B/MARYLAND/15/2016 RECOMBINANT HEMAGGLUTININ ANTIGEN, AND INFLUENZA B VIRUS B/PHUKET/3073/2013 RECOMBINANT HEMAGGLUTININ ANTIGEN 0.5 ML: 45; 45; 45; 45 INJECTION INTRAMUSCULAR at 10:43

## 2018-09-20 RX ADMIN — POTASSIUM & SODIUM PHOSPHATES POWDER PACK 280-160-250 MG 1 PACKET: 280-160-250 PACK at 08:57

## 2018-09-20 RX ADMIN — CARBAMAZEPINE 125 MG: 100 SUSPENSION ORAL at 18:13

## 2018-09-20 RX ADMIN — BRIVARACETAM 100 MG: 10 SOLUTION ORAL at 21:15

## 2018-09-20 RX ADMIN — CARBAMAZEPINE 125 MG: 100 SUSPENSION ORAL at 05:11

## 2018-09-20 RX ADMIN — POTASSIUM & SODIUM PHOSPHATES POWDER PACK 280-160-250 MG 1 PACKET: 280-160-250 PACK at 20:17

## 2018-09-20 RX ADMIN — VANCOMYCIN HYDROCHLORIDE 1250 MG: 5 INJECTION, POWDER, LYOPHILIZED, FOR SOLUTION INTRAVENOUS at 08:57

## 2018-09-20 RX ADMIN — PIPERACILLIN SODIUM,TAZOBACTAM SODIUM 4.5 G: 4; .5 INJECTION, POWDER, FOR SOLUTION INTRAVENOUS at 00:03

## 2018-09-20 RX ADMIN — PIPERACILLIN SODIUM,TAZOBACTAM SODIUM 4.5 G: 4; .5 INJECTION, POWDER, FOR SOLUTION INTRAVENOUS at 06:03

## 2018-09-20 RX ADMIN — Medication 250 MG: at 20:17

## 2018-09-20 RX ADMIN — AMOXICILLIN AND CLAVULANATE POTASSIUM 10 ML: 400; 57 POWDER, FOR SUSPENSION ORAL at 20:17

## 2018-09-20 RX ADMIN — POTASSIUM & SODIUM PHOSPHATES POWDER PACK 280-160-250 MG 1 PACKET: 280-160-250 PACK at 16:34

## 2018-09-20 RX ADMIN — LEVOTHYROXINE SODIUM 137 MCG: 137 TABLET ORAL at 08:58

## 2018-09-20 RX ADMIN — Medication 250 MG: at 08:58

## 2018-09-20 RX ADMIN — HYDROCORTISONE 15 MG: 10 TABLET ORAL at 08:58

## 2018-09-20 RX ADMIN — PANTOPRAZOLE SODIUM 20 MG: 40 TABLET, DELAYED RELEASE ORAL at 08:57

## 2018-09-20 RX ADMIN — HYDROCORTISONE 10 MG: 10 TABLET ORAL at 20:17

## 2018-09-20 RX ADMIN — AMOXICILLIN AND CLAVULANATE POTASSIUM 10 ML: 400; 57 POWDER, FOR SUSPENSION ORAL at 12:50

## 2018-09-20 ASSESSMENT — ACTIVITIES OF DAILY LIVING (ADL)
ADLS_ACUITY_SCORE: 40

## 2018-09-20 NOTE — PROGRESS NOTES
[facility]  Neurology Daily Note      Admission Date:9/15/2018   Date of service: 09/20/2018   Hospital Day: 6      Assessment & Plan   _______________________________  #. (R56.9) Seizures (H)  --complicated seizure history  --PTA on carbamazepine 150 mg q6h & brivaracetam 100 mg BID  --history of elevated carbamazepine levels while on antibiotics  -----currently admitted for pneumonia/sepsis on multiple antibiotics  --held carbamazepine 9/18   -----as in the past, will likely need decreased dose of carbamazepine while on antibiotics  ---recheck level on 9/19 is 10, back in therapeutic range  -----restarted carbamazepine at 125 mg q6h   -----recheck level this morning is 10.4  -------continue decreased dose while on antibiotics. Once antibiotics stopped, increase carbamazapine back to 150 mg q6 and recheck level in 5 days  #. (J18.9) Pneumonia due to infectious organism, unspecified laterality, unspecified part of lung  (primary encounter diagnosis)  --management per primary service  --on multiple antibiotics  #. (A41.9,  R65.21) Septic shock (H)  --management per primary service  --on multiple antibiotics  #. DVT Prophylaxis  --per primary service    Code Status: Full Code    Disposition: pending   No further neurological workup recommended at this time. Dose recommendations as above.    Interval History   _______________________________  Patient presented on 9/16/18 with fever, rigors, increased oral secretions. Complicated medical history with TBI, seizures, right hemiparesis. Patient is nonverbal at baseline. Found to be septic on admission and started on antibiotics. Historically, patient has had high carbamazepine levels while on antibiotics, and was again found to be supratherapeutic this admission. Held carbamazepine yesterday. This morning's level back in therapeutic range. Started carbamazepine at decreased dose of 125 mg q6h. Recheck level this morning 10.4. Keep this dose while on  antibiotics.    Review of Systems   _______________________________  Review of systems is limited by patient factors - patient is nonverbal  Physical Exam   _______________________________  Vitals: Temp: 98.3  F (36.8  C) Temp src: Oral BP: 131/65   Heart Rate: 54 Resp: 18 SpO2: 99 % O2 Device: None (Room air)    Vital Signs with Ranges: Temp:  [97.5  F (36.4  C)-98.3  F (36.8  C)] 98.3  F (36.8  C)  Heart Rate:  [54-66] 54  Resp:  [18] 18  BP: (122-131)/(65-68) 131/65  SpO2:  [95 %-99 %] 99 %    General Appearance:  No acute distress  Neuro:       Mental Status Exam:   Awake, alert, unable to assess orientation. Nonverbal at baseline, nods/shakes head and thumbs up to communicate. Mental status is normal       Cranial Nerves:  Pupils 3 mm, reactive. EOMI. Face is symmetric. Tongue and uvula are midline. Other CN are normal           Motor:  Left weakness, right hemiplegia with contractures. Slightly increased tone on the right           Reflexes:  Normal DTR. Toes equivocal.        Sensory:  Unable to assess         Coordination:   Intact finger-to-nose on the left, unable to assess on the right       Gait:  Unable to assess  Abdomen: Soft, not tender, not distended  Extremities: No clubbing, no cyanosis, no edema    Medications   _______________________________    IV fluid REPLACEMENT ONLY         Brivaracetam  100 mg Per PEG tube BID     carBAMazepine  125 mg Oral Q6H ALONDRA     hydrocortisone  10 mg Oral QPM     hydrocortisone  15 mg Oral QAM     influenza vaccine adult (product based on age)  0.5 mL Intramuscular Prior to discharge     levothyroxine  137 mcg Per Feeding Tube Daily     lidocaine 2 %  10 mL Urethral Once     multivitamins with minerals  15 mL Per Feeding Tube Daily     pantoprazole  20 mg Oral QAM     piperacillin-tazobactam  4.5 g Intravenous Q6H     potassium & sodium phosphates  1 packet Per Feeding Tube TID     saccharomyces boulardii  250 mg Oral or Feeding Tube BID     scopolamine  1 patch  Transdermal Q72H    And     scopolamine   Transdermal Q8H    And     [START ON 9/22/2018] scopolamine   Transdermal Q72H     sodium chloride (PF)  3 mL Intracatheter Q8H     vancomycin (VANCOCIN) IV  1,250 mg Intravenous Q18H     vancomycin place lundberg - receiving intermittent dosing  1 each Does not apply See Admin Instructions       Data   _______________________________      Lab Data:   All data was reviewed by me personally  CBC RESULTS:  Recent Labs   Lab Test  09/20/18   0410  09/19/18   0818  09/18/18   0917   WBC  8.8  7.7  11.1*   RBC  4.29*  4.37*  4.34*   HGB  12.7*  12.9*  13.2*   HCT  38.5*  39.7*  39.0*   PLT  194  209  169     Basic Metabolic Panel:  Recent Labs   Lab Test  09/20/18   0410  09/19/18   0818  09/18/18   1715  09/18/18   0917   NA  147*  150*   --   147*   POTASSIUM  3.4  3.4  4.6  3.1*   CHLORIDE  109  112*   --   111*   CO2  31  31   --   30   BUN  14  13   --   11   CR  1.10  1.04   --   0.91   GLC  87  137*   --   128*   STEVE  8.1*  8.7   --   8.8     Liver panel:  Recent Labs   Lab Test  09/15/18   2055  09/11/18   1202  09/03/18   0822  08/09/18   1403  07/05/18   0021   PROTTOTAL  7.0  8.4  6.6*  7.8  7.8   ALBUMIN  3.1*  3.7  2.8*  3.3*  3.3*   BILITOTAL  0.3  0.3  0.3  0.4  0.5   ALKPHOS  94  109  79  100  96   AST  14  24  18  21  17   ALT  24  38  22  27  28     Coagulation  Recent Labs   Lab Test  02/07/17   0452  01/30/17   0340  01/25/17   0426  01/23/17   1720  01/22/17   1003   12/01/16   0400   09/08/16   0407  09/07/16   0440  09/06/16   0525  09/05/16   0320  09/04/16   1510   01/06/16   0708  01/04/16   2106  11/15/15   1137   INR  1.27*  1.32*  1.49*  1.53*  2.58*   < >  1.30*   < >   --    --    --    --    --    --   1.31*  1.10  1.23*   PTT   --    --    --    --   52*   --   36   --    --    --    --    --    --    --   44*  29  34   AXA   --    --    --    --    --    --    --    --   <0.10  Therapeutic Range:     UFH:   0.15-0.35 IU/mL for low               intensity dosing            0.30-0.70 IU/mL for high              intensity dosing for              DVT and PE     Enoxaparin: If administered              once daily with dose              1.5mg/k.0-2.0 IU/mL                 If administered              twice daily with dose              1mg/k.60-1.0 IU/mL    <0.10  Therapeutic Range:     UFH:   0.15-0.35 IU/mL for low              intensity dosing            0.30-0.70 IU/mL for high              intensity dosing for              DVT and PE     Enoxaparin: If administered              once daily with dose              1.5mg/k.0-2.0 IU/mL                 If administered              twice daily with dose              1mg/k.60-1.0 IU/mL    <0.10  Therapeutic Range:     UFH:   0.15-0.35 IU/mL for low              intensity dosing            0.30-0.70 IU/mL for high              intensity dosing for              DVT and PE     Enoxaparin: If administered              once daily with dose              1.5mg/k.0-2.0 IU/mL                 If administered              twice daily with dose              1mg/k.60-1.0 IU/mL    0.12  0.28   < >   --    --    --     < > = values in this interval not displayed.      Lipid Profile:  Recent Labs   Lab Test  16   0430   TRIG  100     Thyroid Panel:  Recent Labs   Lab Test  18   0021  17   0403  16   0400  16   1003  16   2000   16   1450   TSH  <0.01*   --   <0.01*   --   <0.01*   < >  <0.01*   T4  1.66*  0.54*  0.73*  0.77   --    < >  0.91   FT3   --    --    --   0.9*   --    --   1.7*    < > = values in this interval not displayed.      Vitamin B12:   Recent Labs   Lab Test  16   1450   B12  549      Vitamin D level:   Recent Labs   Lab Test  16   1450   VITDT  49     A1C:   Recent Labs   Lab Test  02/15/18   0610  16   0405  16   0425   A1C  6.6*  5.1  5.8     Troponin I:   Recent Labs   Lab Test  17   0500  17   7997   01/21/17   1600  11/27/16   0400  11/26/16   2234  11/26/16   1150  11/26/16   1024  11/26/16   0745   TROPI  0.017  0.025  0.028  0.789*  1.282*  0.593*  0.434*  0.017     CK:   Recent Labs   Lab Test  11/23/16   1525  12/14/11   0755   CKT  101  38*       CRP inflammation:   Recent Labs   Lab Test  02/06/17   0228  01/30/17   0340  01/25/17   0426   CRP  87.0*  180.0*  170.0*     Ammonia:   Recent Labs   Lab Test  12/01/16   1015   KAMLA  20     UA Results:  Recent Labs   Lab Test  09/15/18   2126   10/03/17   2224   COLOR  Yellow   < >  Yellow   APPEARANCE  Clear   < >  Clear   URINEGLC  Negative   < >  Negative   URINEBILI  Negative   < >  Negative   URINEKETONE  Negative   < >  Negative   SG  1.016   < >  1.015   UBLD  Negative   < >  Negative   URINEPH  8.0*   < >  7.5*   PROTEIN  10*   < >  30*   UROBILINOGEN   --    --   0.2   NITRITE  Negative   < >  Negative   LEUKEST  Negative   < >  Negative   RBCU  <1   < >  O - 2   WBCU  2   < >  O - 2    < > = values in this interval not displayed.        Cardiac US:   --     Neurophysiology:   --     Imaging:   No neuroimaging this admission      Text Page    Vee Augustin, MSN, FNP-BC, RN CNRN SCRN

## 2018-09-20 NOTE — PROGRESS NOTES
CLINICAL NUTRITION SERVICES - REASSESSMENT NOTE      Malnutrition:   (9/17)  % Weight Loss:  None noted  % Intake:  No decreased intake noted  Subcutaneous Fat Loss:  None observed  Muscle Loss:  Temporal region mild depletion  Fluid Retention:  None noted     Malnutrition Diagnosis: Patient does not meet two of the above criteria necessary for diagnosing malnutrition       EVALUATION OF PROGRESS TOWARD GOALS   Diet:  NPO  Nutrition Support:  Patient initiated on nocturnal TF 9/17 and continues as follows ~    Nutrition Support Enteral:  Type of Feeding Tube: G-J tube   Enteral Frequency:  Cyclic  Enteral Regimen: Isosource 1.5 at 100 mL/hr x 12 hours  Total Enteral Provisions: 1800 kcal (24 kcal/kg and 95% needs), 82 g protein (1.1 g/kg and 90% needs), 211 g CHO, 18 g fiber, 912 mL H2O  Free Water Flush: 200 mL every 3 hours   Total fluid ~2500 mL     Intake/Tolerance:    Na 147 (High but improved with increase in flushes yesterday)  Glucose 87 - No sliding scale insulin but receiving Hydrocortisone   BM x 1 today and x 3 yesterday - Receiving Saccharomyces boulardii for bowel health   I/O 2228/5155, weight down slightly to 74.9 kg (down 1.3 kg from admit) - Free water increased yesterday by MD as above         ASSESSED NUTRITION NEEDS:  Dosing Weight 76.2 kg (admit wt on 9/16)  Estimated Energy Needs: 9498-7228 kcals (25-30 Kcal/Kg)  Justification: maintenance  Estimated Protein Needs:  grams protein (1.2-1.5 g pro/Kg)  Justification: hypercatabolism with acute illness      NEW FINDINGS:   Patient receiving Neutraphos and MVI with minerals daily for supplementation     Previous Goals (9/17):   Enteral nutrition to initiate within 24 hrs   Evaluation: Met     Previous Nutrition Diagnosis (9/17):   Inadequate protein-energy intake related to reliant on enteral nutrition to meet 100% of assessed needs as evidenced by currently no TF running   Evaluation: Resolved       CURRENT NUTRITION DIAGNOSIS  No nutrition  diagnosis identified at this time    INTERVENTIONS  Recommendations / Nutrition Prescription  Continue nocturnal TF of Isosource 1.5 at 100 mL/hr x 12 hours per day     Implementation  None     Goals  Isosource 1.5 at 100 mL/hr x 12 hours per day will continue to meet % needs    MONITORING AND EVALUATION:  Progress towards goals will be monitored and evaluated per protocol and Practice Guidelines    Swati Parrish, YONIS, LD, CNSC   Clinical Dietitian - [facility]

## 2018-09-20 NOTE — PROGRESS NOTES
Alert, orientated x2, nonverbal, can communicate hand signals and yes or no. Agitated easily at times. Lerma in place. Repo q2hrs. Placed new PIV on left arm, removed infiltrated IV on left foot. VS stable. Continuous tube feeds going.     Stacey Foley

## 2018-09-20 NOTE — PROGRESS NOTES
[facility]    Infectious Disease Progress Note    Date of Service (when I saw the patient): 09/20/2018     Assessment & Plan   Keyon Farias is a 58 year old male who was admitted on 9/15/2018.     Impression:  1. 58 y.o male with TBI.   2. Right sided hemiparesis.   3. Contractures.   4. Recurrent pneumonia.   5. PEG tube.   6. Admitted with fevers, all the regular symptoms of aspiration pneumonia.   7. 2/2 cultures positive for staph epi. No intravascular devices.      Recommendations;   Creat up, vanco level high. Stop vanco should have enough vanco in the system that would cover for 7 plus days. Has 2 strains of staph epi but 2/2 positive cultures, nothing to indicate deep infection, repeat cultures are negative, enough treatment for this.   Day 6 of therapy for aspiration pneumonia, switch to oral Augmentin for 4 more days.     I will sign off please call if ques.     Senait Orona MD    Interval History   He is afebrile   Cultures as above        Physical Exam   Temp: 98.2  F (36.8  C) Temp src: Oral BP: 119/62   Heart Rate: 56 Resp: 18 SpO2: 100 % O2 Device: None (Room air)    Vitals:    09/18/18 0702 09/19/18 0709 09/20/18 0930   Weight: 75.6 kg (166 lb 10.7 oz) 74.9 kg (165 lb 2 oz) 79.7 kg (175 lb 11.3 oz)     Vital Signs with Ranges  Temp:  [97.5  F (36.4  C)-98.3  F (36.8  C)] 98.2  F (36.8  C)  Heart Rate:  [54-66] 56  Resp:  [18] 18  BP: (119-131)/(62-68) 119/62  SpO2:  [95 %-100 %] 100 %    Constitutional: non verbal, not in distress   Lungs: Clear to auscultation bilaterally, no crackles or wheezing  Cardiovascular: Regular rate and rhythm, normal S1 and S2, and no murmur noted  Abdomen: Normal bowel sounds, soft, non-distended, non-tender, PEG tube in place  Skin: No rashes, no cyanosis, no edema  Other:    Medications     IV fluid REPLACEMENT ONLY         Brivaracetam  100 mg Per PEG tube BID     carBAMazepine  125 mg Oral Q6H ALONDRA     hydrocortisone  10 mg Oral QPM     hydrocortisone   15 mg Oral QAM     levothyroxine  137 mcg Per Feeding Tube Daily     lidocaine 2 %  10 mL Urethral Once     multivitamins with minerals  15 mL Per Feeding Tube Daily     pantoprazole  20 mg Oral QAM     piperacillin-tazobactam  4.5 g Intravenous Q6H     potassium & sodium phosphates  1 packet Per Feeding Tube TID     saccharomyces boulardii  250 mg Oral or Feeding Tube BID     scopolamine  1 patch Transdermal Q72H    And     scopolamine   Transdermal Q8H    And     [START ON 9/22/2018] scopolamine   Transdermal Q72H     sodium chloride (PF)  3 mL Intracatheter Q8H     vancomycin (VANCOCIN) IV  1,250 mg Intravenous Q18H     vancomycin place lundberg - receiving intermittent dosing  1 each Does not apply See Admin Instructions       Data   All microbiology laboratory data reviewed.  Recent Labs   Lab Test  09/20/18   0410  09/19/18   0818  09/18/18   0917   WBC  8.8  7.7  11.1*   HGB  12.7*  12.9*  13.2*   HCT  38.5*  39.7*  39.0*   MCV  90  91  90   PLT  194  209  169     Recent Labs   Lab Test  09/20/18   0410  09/19/18   0818  09/18/18   0917   CR  1.10  1.04  0.91     No lab results found.  Recent Labs   Lab Test  09/18/18   1715  09/18/18   1308  09/17/18   1433  09/15/18   2126  09/15/18   2055  09/15/18   2050  09/02/18   0123  09/02/18   0100  09/01/18   2330   CULT  No growth after 2 days  No growth after 2 days  No growth after 3 days  No growth  Cultured on the 2nd day of incubation:  Staphylococcus epidermidis  *  Critical Value/Significant Value, preliminary result only, called to and read back by  CEDRICK SPICER RN @0010 9/17/18. SCG    Susceptibility testing done on previous specimen  Cultured on the 1st day of incubation:  Staphylococcus epidermidis  *  Critical Value/Significant Value, preliminary result only, called to and read back by  Edgar Vidales RN at 2312 on 9/16/18 by FELTON.    Cultured on the 1st day of incubation:  Strain 2  Staphylococcus epidermidis  *  (Note)  POSITIVE for  STAPHYLOCOCCUS EPIDERMIDIS and POSITIVE for the mecA  gene (resistant to methicillin) by [facility] multiplex nucleic acid  test. Final identification and antimicrobial susceptibility testing  will be verified by standard methods.    Specimen tested with [employer] multiplex, gram-positive blood culture  nucleic acid test for the following targets: Staph aureus, Staph  epidermidis, Staph lugdunensis, other Staph species, Enterococcus  faecalis, Enterococcus faecium, Streptococcus species, S. agalactiae,  S. anginosus grp., S. pneumoniae, S. pyogenes, Listeria sp., mecA  (methicillin resistance) and Alberto/B (vancomycin resistance).    Critical Value/Significant Value called to and read back by CEDRICK SPICER RN @0206 9/17/18. SCG      No growth  No growth  No growth

## 2018-09-20 NOTE — PLAN OF CARE
Problem: Patient Care Overview  Goal: Plan of Care/Patient Progress Review  Outcome: Improving  Alert, nonverbal, nod yes/no and follows commands. VSS on RA. Tele: NSR. Up with lift, turn/reposition. No nonverbal s/s of pain, nausea, SOB. TF ran from 5307-9408. PEG site CDI. Scopalamine patch in place left ear. New IV in left arm.  IV abx vanco/zosyn discontinued, transitioned to PO augmentin. Lerma patent wit good UOP. Oral cares as patient allows. Coccyx red, barrier applied and miconazole ordered. Incontinent of stool, no BM today yet. Nursing will continue to monitor. Discharge tomorrow likely back to care facility.

## 2018-09-20 NOTE — PHARMACY-VANCOMYCIN DOSING SERVICE
Pharmacy Vancomycin Note  Date of Service 2018  Patient's  1960   58 year old, male    Indication: Sepsis  Goal Trough Level: 15-20 mg/L  Day of Therapy: 2  Current Vancomycin regimen: 1250 mg IV once    Current estimated CrCl = Estimated Creatinine Clearance: 79.4 mL/min (based on Cr of 1.1).    Creatinine for last 3 days  2018:  8:15 AM Creatinine 0.87 mg/dL  2018:  9:17 AM Creatinine 0.91 mg/dL  2018:  8:18 AM Creatinine 1.04 mg/dL  2018:  4:10 AM Creatinine 1.10 mg/dL    Recent Vancomycin Levels (past 3 days)  2018: 10:40 AM Vancomycin Level Canceled, Test credited mg/L;  1:17 PM Vancomycin Level 20.3 mg/L  2018:  1:08 PM Vancomycin Level 27.2 mg/L  2018:  9:05 PM Vancomycin Level 25.1 mg/L  2018:  4:10 AM Vancomycin Level 17.8 mg/L    Vancomycin IV Administrations (past 72 hours)                   vancomycin (VANCOCIN) 1,250 mg in sodium chloride 0.9 % 250 mL intermittent infusion (mg) 1,250 mg New Bag 18 1038     1,250 mg Restarted 18 2303     1,250 mg New Bag  2127    vancomycin (VANCOCIN) 1000 mg in dextrose 5% 200 mL PREMIX (mg) 1,000 mg New Bag 18 0620     1,000 mg New Bag 18 2202     1,000 mg New Bag  1312     1,000 mg New Bag  0541                Nephrotoxins and other renal medications (Future)    Start     Dose/Rate Route Frequency Ordered Stop    18 0600  vancomycin (VANCOCIN) 1,250 mg in sodium chloride 0.9 % 250 mL intermittent infusion      1,250 mg  over 90 Minutes Intravenous EVERY 18 HOURS 18 0441      18  vancomycin place lundberg - receiving intermittent dosing      1 each Does not apply SEE ADMIN INSTRUCTIONS 18 1200  piperacillin-tazobactam (ZOSYN) 4.5 g vial to attach to  mL bag     Comments:  Pharmacy can adjust dose based on renal function.    4.5 g  over 30 Minutes Intravenous EVERY 6 HOURS 18 0804               Contrast Orders - past 72 hours      None          Interpretation of levels and current regimen:  Trough level is  17.8    Has serum creatinine changed > 50% in last 72 hours: No    Urine output:  good urine output    Renal Function: Stable    Plan:  1.  Increase Dose to 1250mg q18h  2.  Pharmacy will check trough levels as appropriate in 1-3 Days.    3. Serum creatinine levels will be ordered daily for the first week of therapy and at least twice weekly for subsequent weeks.      Klever Rodriguez        .

## 2018-09-20 NOTE — PHARMACY-VANCOMYCIN DOSING SERVICE
Pharmacy Vancomycin Note  Date of Service 2018  Patient's  1960   58 year old, male    Indication: Sepsis  Goal Trough Level: 15-20 mg/L  Day of Therapy: 4  Current Vancomycin regimen:  1250 mg IV q12h    Current estimated CrCl = Estimated Creatinine Clearance: 84 mL/min (based on Cr of 1.04).    Creatinine for last 3 days  2018:  8:15 AM Creatinine 0.87 mg/dL  2018:  9:17 AM Creatinine 0.91 mg/dL  2018:  8:18 AM Creatinine 1.04 mg/dL    Recent Vancomycin Levels (past 3 days)  2018: 10:40 AM Vancomycin Level Canceled, Test credited mg/L;  1:17 PM Vancomycin Level 20.3 mg/L  2018:  1:08 PM Vancomycin Level 27.2 mg/L  2018:  9:05 PM Vancomycin Level 25.1 mg/L    Vancomycin IV Administrations (past 72 hours)                   vancomycin (VANCOCIN) 1,250 mg in sodium chloride 0.9 % 250 mL intermittent infusion (mg) 1,250 mg New Bag 18 1038     1,250 mg Restarted 18 2303     1,250 mg New Bag  2127    vancomycin (VANCOCIN) 1000 mg in dextrose 5% 200 mL PREMIX (mg) 1,000 mg New Bag 18 0620     1,000 mg New Bag 18 2202     1,000 mg New Bag  1312     1,000 mg New Bag  0541                Nephrotoxins and other renal medications (Future)    Start     Dose/Rate Route Frequency Ordered Stop    18  vancomycin place lundberg - receiving intermittent dosing      1 each Does not apply SEE ADMIN INSTRUCTIONS 18 1200  piperacillin-tazobactam (ZOSYN) 4.5 g vial to attach to  mL bag     Comments:  Pharmacy can adjust dose based on renal function.    4.5 g  over 30 Minutes Intravenous EVERY 6 HOURS 18 0804               Contrast Orders - past 72 hours     None          Interpretation of levels and current regimen:  Trough level is  Supratherapeutic    Has serum creatinine changed > 50% in last 72 hours: No. However, SCr has been steadily increasing. (Baseline 0.84. Now 1.04)    Urine output:  good urine  output    Renal Function: Worsening    Plan:  1.  Moved to intermittant dosing and will obtain another level at 0400. (Post 18 hours last dose) I am concerned about the patient's steady increase in creatinine so we will see where he is trending in the AM.   2.  Pharmacy will check trough levels as appropriate in 1-3 Days.    3. Serum creatinine levels will be ordered daily for the first week of therapy and at least twice weekly for subsequent weeks.      Myah Whittaker        .

## 2018-09-20 NOTE — PROGRESS NOTES
[facility] [phone] notified of poss discharge tomorrow. Currently he is only receiving therapy. I will check with his mother regarding ay skilled nursing needs. Patient does get nursing needs through [facility]. Their fax is [phone]
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
## 2018-09-21 VITALS
BODY MASS INDEX: 23.32 KG/M2 | HEART RATE: 87 BPM | OXYGEN SATURATION: 94 % | RESPIRATION RATE: 18 BRPM | SYSTOLIC BLOOD PRESSURE: 100 MMHG | TEMPERATURE: 97.8 F | DIASTOLIC BLOOD PRESSURE: 63 MMHG | WEIGHT: 171.96 LBS

## 2018-09-21 LAB
CREAT SERPL-MCNC: 0.84 MG/DL (ref 0.66–1.25)
GFR SERPL CREATININE-BSD FRML MDRD: >90 ML/MIN/1.7M2
SODIUM SERPL-SCNC: 140 MMOL/L (ref 133–144)

## 2018-09-21 PROCEDURE — 84295 ASSAY OF SERUM SODIUM: CPT | Performed by: INTERNAL MEDICINE

## 2018-09-21 PROCEDURE — 99239 HOSP IP/OBS DSCHRG MGMT >30: CPT | Performed by: PHYSICIAN ASSISTANT

## 2018-09-21 PROCEDURE — 25000132 ZZH RX MED GY IP 250 OP 250 PS 637: Mod: GY | Performed by: PHYSICIAN ASSISTANT

## 2018-09-21 PROCEDURE — 36415 COLL VENOUS BLD VENIPUNCTURE: CPT | Performed by: INTERNAL MEDICINE

## 2018-09-21 PROCEDURE — 82565 ASSAY OF CREATININE: CPT | Performed by: INTERNAL MEDICINE

## 2018-09-21 PROCEDURE — 25000132 ZZH RX MED GY IP 250 OP 250 PS 637: Mod: GY | Performed by: INTERNAL MEDICINE

## 2018-09-21 PROCEDURE — 25000132 ZZH RX MED GY IP 250 OP 250 PS 637: Mod: GY | Performed by: NURSE PRACTITIONER

## 2018-09-21 PROCEDURE — A9270 NON-COVERED ITEM OR SERVICE: HCPCS | Mod: GY | Performed by: PHYSICIAN ASSISTANT

## 2018-09-21 PROCEDURE — A9270 NON-COVERED ITEM OR SERVICE: HCPCS | Mod: GY | Performed by: NURSE PRACTITIONER

## 2018-09-21 PROCEDURE — A9270 NON-COVERED ITEM OR SERVICE: HCPCS | Mod: GY | Performed by: INTERNAL MEDICINE

## 2018-09-21 PROCEDURE — 25000125 ZZHC RX 250: Performed by: INTERNAL MEDICINE

## 2018-09-21 RX ORDER — AMOXICILLIN AND CLAVULANATE POTASSIUM 400; 57 MG/5ML; MG/5ML
10 POWDER, FOR SUSPENSION ORAL 2 TIMES DAILY
Qty: 80 ML | Refills: 0 | Status: SHIPPED | OUTPATIENT
Start: 2018-09-21 | End: 2019-01-31

## 2018-09-21 RX ORDER — SCOLOPAMINE TRANSDERMAL SYSTEM 1 MG/1
1 PATCH, EXTENDED RELEASE TRANSDERMAL
Qty: 10 PATCH | Refills: 0 | Status: SHIPPED | OUTPATIENT
Start: 2018-09-22 | End: 2019-01-31

## 2018-09-21 RX ORDER — CARBAMAZEPINE 100 MG/5ML
125 SUSPENSION ORAL EVERY 6 HOURS
Qty: 600 ML | Status: ON HOLD
Start: 2018-09-21 | End: 2018-11-02

## 2018-09-21 RX ORDER — SACCHAROMYCES BOULARDII 250 MG
250 CAPSULE ORAL 2 TIMES DAILY
Qty: 60 CAPSULE | Refills: 0 | Status: SHIPPED | OUTPATIENT
Start: 2018-09-21 | End: 2019-01-31

## 2018-09-21 RX ADMIN — HYDROCORTISONE 15 MG: 10 TABLET ORAL at 08:45

## 2018-09-21 RX ADMIN — LEVOTHYROXINE SODIUM 137 MCG: 137 TABLET ORAL at 08:45

## 2018-09-21 RX ADMIN — Medication 250 MG: at 08:45

## 2018-09-21 RX ADMIN — CARBAMAZEPINE 125 MG: 100 SUSPENSION ORAL at 05:57

## 2018-09-21 RX ADMIN — POTASSIUM & SODIUM PHOSPHATES POWDER PACK 280-160-250 MG 1 PACKET: 280-160-250 PACK at 08:45

## 2018-09-21 RX ADMIN — AMOXICILLIN AND CLAVULANATE POTASSIUM 10 ML: 400; 57 POWDER, FOR SUSPENSION ORAL at 08:45

## 2018-09-21 RX ADMIN — BRIVARACETAM 100 MG: 10 SOLUTION ORAL at 09:45

## 2018-09-21 RX ADMIN — PANTOPRAZOLE SODIUM 20 MG: 40 TABLET, DELAYED RELEASE ORAL at 08:45

## 2018-09-21 RX ADMIN — CARBAMAZEPINE 125 MG: 100 SUSPENSION ORAL at 00:24

## 2018-09-21 RX ADMIN — MULTIVITAMIN 15 ML: LIQUID ORAL at 08:45

## 2018-09-21 RX ADMIN — CARBAMAZEPINE 125 MG: 100 SUSPENSION ORAL at 11:58

## 2018-09-21 ASSESSMENT — ACTIVITIES OF DAILY LIVING (ADL)
ADLS_ACUITY_SCORE: 40

## 2018-09-21 NOTE — PROGRESS NOTES
Orders faxed to [facility] home care fx # [phone] and [facility]- fx #[phone].

## 2018-09-21 NOTE — PLAN OF CARE
Problem: Patient Care Overview  Goal: Plan of Care/Patient Progress Review  Outcome: Adequate for Discharge Date Met: 09/21/18  Discharge    Patient discharged to home via PCA and mom with home care resumed  Care plan note: Patient was alert, nonverbal, nod yes/no and follows commands. VSS on RA. Tele: ST. Up with lift, turn/reposition. No nonverbal s/s of pain, nausea, SOB. TF ran until patient discharged. PEG site CDI. Scopalamine patch in place left ear. Lerma removed, incontinent and voided. Coccyx red, barrier applied and miconazole. Incontinent of stool, no BM today yet. Discharged this afternoon. No patient belongings to send home with patient, mom brought clothes. IV removed. Discharge AVS reviewed, educated on aspiration PNA and medications. Mom verbalized understanding and had no further questions. Follow up appointment made. Discharge meds sent home with mom.        Listed belongings gathered and returned to patient. Yes  Care Plan and Patient education resolved: Yes  Prescriptions if needed, hard copies sent with patient  Yes  Home and hospital acquired medications returned to patient: NA  Medication Bin checked and emptied on discharge Yes  Follow up appointment made for patient: Yes    Addendum: Our pharmacy does not make compounded solutions. Protonix will have to be filled at another pharmacy. Writer called [facility] [address] in [place] [phone] and called in this prescription. Patients mother aware and will  medication. All other medications mother will  on 66 after she drops son off at home.
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
## 2018-09-21 NOTE — PROGRESS NOTES
Spoke with his mother Savannah on the phone-patient will discharge home today with resumption of [facility] home care [facility] and therapy [facility]. Savannah will be over with his PCA to pick him up. They will take him in a van.    Spoke with Zoe at [facility]-I will fax orders/h/p to her at [phone]

## 2018-09-21 NOTE — PLAN OF CARE
Problem: Patient Care Overview  Goal: Plan of Care/Patient Progress Review  Outcome: No Change  HECTOR orientation, nonverbal - able to answer yes/no questions at times. VSS on RA. No nonverbal signs of pain. Lerma patent. Coccyx with blanchable erythema. Turn and repo q2h. Plan to discharge to care facility today.

## 2018-09-22 ENCOUNTER — APPOINTMENT (OUTPATIENT)
Dept: GENERAL RADIOLOGY | Facility: CLINIC | Age: 58
End: 2018-09-22
Attending: PHYSICIAN ASSISTANT
Payer: MEDICARE

## 2018-09-22 ENCOUNTER — HOSPITAL ENCOUNTER (EMERGENCY)
Facility: CLINIC | Age: 58
Discharge: HOME | End: 2018-09-22
Attending: PHYSICIAN ASSISTANT | Admitting: PHYSICIAN ASSISTANT
Payer: MEDICARE

## 2018-09-22 VITALS
DIASTOLIC BLOOD PRESSURE: 69 MMHG | BODY MASS INDEX: 20.99 KG/M2 | TEMPERATURE: 97 F | HEART RATE: 81 BPM | RESPIRATION RATE: 18 BRPM | SYSTOLIC BLOOD PRESSURE: 98 MMHG | WEIGHT: 155 LBS | OXYGEN SATURATION: 98 % | HEIGHT: 72 IN

## 2018-09-22 DIAGNOSIS — M79.5 FOREIGN BODY (FB) IN SOFT TISSUE: ICD-10-CM

## 2018-09-22 PROCEDURE — 99283 EMERGENCY DEPT VISIT LOW MDM: CPT

## 2018-09-22 PROCEDURE — 73552 X-RAY EXAM OF FEMUR 2/>: CPT | Mod: LT

## 2018-09-22 ASSESSMENT — ENCOUNTER SYMPTOMS: WOUND: 1

## 2018-09-22 NOTE — DISCHARGE INSTRUCTIONS
*Return for any signs of infection including redness, increasing pain, etc.       Foreign Object Under the Skin, Not Removed  Very small particles that remain under the skin usually don t cause problems or need further treatment. But occasionally they can cause an infection. Sometimes they work their way to the surface on their own without any problems. If you see this happening, you can remove any particles with tweezers, but be careful not to dig up the skin and make things worse.  Home care  Wound care    Keep the wound clean and dry.    If there is a dressing or bandage, change it when it gets wet or dirty. Otherwise, leave it on for the first 24 hours, then change it once a day or as often as you were instructed.    If stitches or staples were used, clean the wound every day:  ? After taking off the dressing, wash the area gently with soap and water.  ? Apply a thin layer of antibiotic ointment to the cut. This will keep the wound clean and make it easier to remove the stitches. If it is oozing a lot, you can put a nonstick dressing over it. Then reapply the bandage or dressing as you were instructed.  ? You can get it wet, just like when you clean it. This means you can shower as usual for the first 24 hours, but do not soak the area in water (no baths or swimming) until the stitches or staples are taken out.    If surgical tape or strips were used, keep the area clean and dry. If it becomes wet, blot it dry with a towel.  Medicine    You can take over-the-counter medicine for pain, unless you were given a different pain medicine to use. If you have chronic liver or kidney disease or ever had a stomach ulcer, or gastrointestinal bleeding or are taking blood thinner medicines, talk with your healthcare provider before using these medicines.    If you were given antibiotics, take them until they are used up. It is important to finish the antibiotics even if the wound looks better to make sure the infection  aye.  Follow-up care  Follow up your healthcare provider, or as advised. Keep in mind the following:    Watch for any signs of infection, such as increasing pain, redness, swelling, or pus drainage. If this happens, don t wait for your scheduled visit, rather see your healthcare provider sooner.    Stitches or staples are usually taken out within 5 to 14 days. This varies depending on what part of your body they are on, and the type of wound. The healthcare provider will tell you how long they should be left in.    If surgical tape or strips were used, they are usually left on for 7 to 10 days. You can remove them after that unless you were told otherwise. If you try to remove them, and it is too difficult, soaking can help. If the edges of the cut pull apart, then stop removing the tape, and follow up with your healthcare provider.    If X-rays were taken, you will be told if there are new findings that may affect your care.  When to seek medical advice  Call your healthcare provider right away if any of these occur:    Fever of 100.4 F (38 C) or higher, or as directed by your healthcare provider    Increasing pain in the wound    Redness, swelling or pus coming from the wound  Date Last Reviewed: 10/1/2016    5216-6313 [employer]. [address] [place] [address], [place] [address]. All rights reserved. This information is not intended as a substitute for professional medical care. Always follow your healthcare professional's instructions.

## 2018-09-22 NOTE — ED AVS SNAPSHOT
Emergency Department    [address] [facility] [address]    Phone:  [phone]    Fax:  [phone]                                       Keyon Farias   MRN: [id number]    Department:   Emergency Department   Date of Visit:  9/22/2018           Patient Information     Date Of Birth          1960        Your diagnoses for this visit were:     Foreign body (FB) in soft tissue suspected, none found       You were seen by Germaine Delgado PA-C.      Follow-up Information     Follow up with Carlos Gomez MD In 3 days.    Why:  As needed    Contact information:    [address] [address]  [phone]          Follow up with  Emergency Department.    Specialty:  EMERGENCY MEDICINE    Why:  As needed, If symptoms worsen    Contact information:    [address] [facility] [id number] [phone]        Discharge Instructions       *Return for any signs of infection including redness, increasing pain, etc.       Foreign Object Under the Skin, Not Removed  Very small particles that remain under the skin usually don t cause problems or need further treatment. But occasionally they can cause an infection. Sometimes they work their way to the surface on their own without any problems. If you see this happening, you can remove any particles with tweezers, but be careful not to dig up the skin and make things worse.  Home care  Wound care    Keep the wound clean and dry.    If there is a dressing or bandage, change it when it gets wet or dirty. Otherwise, leave it on for the first 24 hours, then change it once a day or as often as you were instructed.    If stitches or staples were used, clean the wound every day:  ? After taking off the dressing, wash the area gently with soap and water.  ? Apply a thin layer of antibiotic ointment to the cut. This will keep the wound clean and make it easier to remove the stitches. If it is oozing a lot, you can  put a nonstick dressing over it. Then reapply the bandage or dressing as you were instructed.  ? You can get it wet, just like when you clean it. This means you can shower as usual for the first 24 hours, but do not soak the area in water (no baths or swimming) until the stitches or staples are taken out.    If surgical tape or strips were used, keep the area clean and dry. If it becomes wet, blot it dry with a towel.  Medicine    You can take over-the-counter medicine for pain, unless you were given a different pain medicine to use. If you have chronic liver or kidney disease or ever had a stomach ulcer, or gastrointestinal bleeding or are taking blood thinner medicines, talk with your healthcare provider before using these medicines.    If you were given antibiotics, take them until they are used up. It is important to finish the antibiotics even if the wound looks better to make sure the infection clears.  Follow-up care  Follow up your healthcare provider, or as advised. Keep in mind the following:    Watch for any signs of infection, such as increasing pain, redness, swelling, or pus drainage. If this happens, don t wait for your scheduled visit, rather see your healthcare provider sooner.    Stitches or staples are usually taken out within 5 to 14 days. This varies depending on what part of your body they are on, and the type of wound. The healthcare provider will tell you how long they should be left in.    If surgical tape or strips were used, they are usually left on for 7 to 10 days. You can remove them after that unless you were told otherwise. If you try to remove them, and it is too difficult, soaking can help. If the edges of the cut pull apart, then stop removing the tape, and follow up with your healthcare provider.    If X-rays were taken, you will be told if there are new findings that may affect your care.  When to seek medical advice  Call your healthcare provider right away if any of these  occur:    Fever of 100.4 F (38 C) or higher, or as directed by your healthcare provider    Increasing pain in the wound    Redness, swelling or pus coming from the wound  Date Last Reviewed: 10/1/2016    6368-9123 [employer]. [address] [place] [address]. All rights reserved. This information is not intended as a substitute for professional medical care. Always follow your healthcare professional's instructions.          24 Hour Appointment Hotline       To make an appointment at any [facility], call [phone] ([phone]). If you don't have a family doctor or clinic, we will help you find one. [facility] clinics are conveniently located to serve the needs of you and your family.             Review of your medicines      Our records show that you are taking the medicines listed below. If these are incorrect, please call your family doctor or clinic.        Dose / Directions Last dose taken    acetaminophen 500 MG tablet   Commonly known as:  TYLENOL   Dose:  1000 mg        1,000 mg by Oral or FT or NG tube route every 6 hours as needed for mild pain   Refills:  0        albuterol (2.5 MG/3ML) 0.083% neb solution   Dose:  1 vial        Take 1 vial by nebulization every 4 hours as needed for shortness of breath / dyspnea or wheezing   Refills:  0        amoxicillin-clavulanate 400-57 MG/5ML suspension   Commonly known as:  AUGMENTIN   Dose:  10 mL   Indication:  Pneumonia caused by Inhaling a Substance Into the Lungs   Quantity:  80 mL        Take 10 mLs by mouth 2 times daily for 4 days   Refills:  0        bacitracin ointment        Apply topically daily as needed for wound care To PEG site.   Refills:  0        BENEFIBER PO   Dose:  2 teaspoonful        Take 2 teaspoonful by mouth daily   Refills:  0        BRIVIACT 10 MG/ML solution   Dose:  100 mg   Generic drug:  Brivaracetam        100 mg by Oral or FT or NG tube route 2 times daily @0900 and 2100   Refills:  0         calcium carbonate 1250 (500 Ca) MG/5ML Susp suspension   Dose:  1250 mg   Quantity:  450 mL        5 mLs (1,250 mg) by Per J Tube route 3 times daily (with meals)   Refills:  0        carBAMazepine 100 MG/5ML suspension   Commonly known as:  TEGretol   Dose:  125 mg   Quantity:  600 mL        6.25 mLs (125 mg) by Per GJ tube route every 6 hours   Refills:  0        CENTRUM SILVER per tablet   Dose:  1 tablet        Take 1 tablet by mouth daily Crush and feed via j-tube   Refills:  0        * hydrocortisone 5 MG tablet   Commonly known as:  CORTEF   Dose:  10 mg        10 mg by Oral or FT or NG tube route every evening 2 tablets x 5mg=10mg   Refills:  0        * hydrocortisone 5 MG tablet   Commonly known as:  CORTEF   Dose:  15 mg        15 mg by Oral or FT or NG tube route every morning 3 tablets x 5mg=15mg   Refills:  0        levothyroxine 137 MCG tablet   Commonly known as:  SYNTHROID/LEVOTHROID   Dose:  137 mcg        137 mcg by Oral or FT or NG tube route daily   Refills:  0        melatonin 1 MG/ML Liqd liquid   Dose:  6 mg        6 mg by Jejunal Tube route nightly as needed for sleep   Refills:  0        pantoprazole 2 mg/mL Susp suspension   Commonly known as:  PROTONIX   Dose:  20 mg   Quantity:  300 mL        Take 10 mLs (20 mg) by mouth every morning   Refills:  0        potassium & sodium phosphates 280-160-250 MG Packet   Commonly known as:  NEUTRA-PHOS   Dose:  1 packet        Take 1 packet by mouth 3 times daily 0900, 1500, 2100.   Refills:  0        saccharomyces boulardii 250 MG capsule   Commonly known as:  FLORASTOR   Dose:  250 mg   Quantity:  60 capsule        1 capsule (250 mg) by Oral or Feeding Tube route 2 times daily   Refills:  0        scopolamine 72 hr patch   Commonly known as:  TRANSDERM   Dose:  1 patch   Quantity:  10 patch        Place 1 patch onto the skin every 72 hours   Refills:  0        testosterone cypionate 200 MG/ML injection   Commonly known as:  DEPOTESTOTERONE   Dose:   100 mg        Inject 100 mg into the muscle See Admin Instructions Every 2 weeks.   Refills:  0        Vitamin D (Cholecalciferol) 1000 units Tabs   Dose:  2 tablet        Take 2 tablets by mouth every morning   Refills:  0        * Notice:  This list has 2 medication(s) that are the same as other medications prescribed for you. Read the directions carefully, and ask your doctor or other care provider to review them with you.            Procedures and tests performed during your visit     XR Femur Left 2 Views      Orders Needing Specimen Collection     None      Pending Results     No orders found from 9/20/2018 to 9/23/2018.            Pending Culture Results     No orders found from 9/20/2018 to 9/23/2018.            Pending Results Instructions     If you had any lab results that were not finalized at the time of your Discharge, you can call the ED Lab Result RN at [phone]. You will be contacted by this team for any positive Lab results or changes in treatment. The nurses are available 7 days a week from 10A to 6:30P.  You can leave a message 24 hours per day and they will return your call.        Test Results From Your Hospital Stay        9/22/2018  2:54 PM      Narrative     XR FEMUR LT 2 VW   9/22/2018 2:46 PM     HISTORY:  foreign body- needle proximal, anterior thigh;         Impression     IMPRESSION: Unremarkable exam.    GILBERTO RODRIGUES MD                Clinical Quality Measure: Blood Pressure Screening     Your blood pressure was checked while you were in the emergency department today. The last reading we obtained was  BP: 98/69 . Please read the guidelines below about what these numbers mean and what you should do about them.  If your systolic blood pressure (the top number) is less than 120 and your diastolic blood pressure (the bottom number) is less than 80, then your blood pressure is normal. There is nothing more that you need to do about it.  If your systolic blood pressure (the top number)  "is 120-139 or your diastolic blood pressure (the bottom number) is 80-89, your blood pressure may be higher than it should be. You should have your blood pressure rechecked within a year by a primary care provider.  If your systolic blood pressure (the top number) is 140 or greater or your diastolic blood pressure (the bottom number) is 90 or greater, you may have high blood pressure. High blood pressure is treatable, but if left untreated over time it can put you at risk for heart attack, stroke, or kidney failure. You should have your blood pressure rechecked by a primary care provider within the next 4 weeks.  If your provider in the emergency department today gave you specific instructions to follow-up with your doctor or provider even sooner than that, you should follow that instruction and not wait for up to 4 weeks for your follow-up visit.        Thank you for choosing [facility]       Thank you for choosing [facility] for your care. Our goal is always to provide you with excellent care. Hearing back from our patients is one way we can continue to improve our services. Please take a few minutes to complete the written survey that you may receive in the mail after you visit with us. Thank you!        [employer] Information     [facility] lets you send messages to your doctor, view your test results, renew your prescriptions, schedule appointments and more. To sign up, go to [email or web address] . Click on \"Log in\" on the left side of the screen, which will take you to the Welcome page. Then click on \"Sign up Now\" on the right side of the page.     You will be asked to enter the access code listed below, as well as some personal information. Please follow the directions to create your username and password.     Your access code is: [email or web address]  Expires: 10/6/2018  9:33 AM     Your access code will  in 90 days. If you need help or a new code, please call your [facility] clinic or [phone].        Care " EveryWhere ID     This is your Care EveryWhere ID. This could be used by other organizations to access your [facility] medical records  [id number]        Equal Access to Services     BRENDA LAO : Angely Rivas, krishna buenrostro, tobias sofia, yaw contreras. So [facility] [phone].    ATENCIÓN: Si habla español, tiene a king disposición servicios gratuitos de asistencia lingüística. Llame al [phone].    We comply with applicable federal civil rights laws and [place] laws. We do not discriminate on the basis of race, color, national origin, age, disability, sex, sexual orientation, or gender identity.            After Visit Summary       This is your record. Keep this with you and show to your community pharmacist(s) and doctor(s) at your next visit.

## 2018-09-22 NOTE — ED AVS SNAPSHOT
Emergency Department    [address] [place] [address]    Phone:  [phone]    Fax:  [phone]                                       Keyon Farias   MRN: [id number]    Department:   Emergency Department   Date of Visit:  9/22/2018           After Visit Summary Signature Page     I have received my discharge instructions, and my questions have been answered. I have discussed any challenges I see with this plan with the nurse or doctor.    ..........................................................................................................................................  Patient/Patient Representative Signature      ..........................................................................................................................................  Patient Representative Print Name and Relationship to Patient    ..................................................               ................................................  Date                                   Time    ..........................................................................................................................................  Reviewed by Signature/Title    ...................................................              ..............................................  Date                                               Time          22EPIC Rev 08/18

## 2018-09-22 NOTE — ED PROVIDER NOTES
History     Chief Complaint:  Wound check    HPI   Keyon Farias is a 58 year old male with a history of TBI who presents with his home nurse to the ER with concern for a wound. The patient's home nurse reports that he was administering the patient's regular testosterone shot, when the needle became entirely imbedded in the patients proximal anterior thigh. The patient reports some pain. All other concerns are denies here in the ER today.      Allergies:  Dilantin  Valproic acid      Medications:    Benefiber  Vitamin D  Testosterone  Transderm  Florastor  Levothyroxine  Tegretol   Briviact    Past Medical History:    Vfib  UTI  Appendicitis  Intractable epilepsy  Artery occlusion  Cerebral infarction  TBI  Tracheostomy  Thyroid disease  Spastic hemiplegia affecting dominant side  Septic shock  Seizures  Pneumonia  Panhypopituitarism  GERD  DVT  Aphasia   Necrotizing pancreatitis    Past Surgical History:    Vascular surgery  Tracheostomy  Right hand repair  Reconstructive facial surgery  Laparoscopic appendectomy  Necrosectomy    Family History:    No past pertinent family history.    Social History:  Marital Status:  Single [1]  Former smoker: quit 1989  Negative for alcohol use.      Review of Systems   Skin: Positive for wound.   All other systems reviewed and are negative.      Physical Exam     Patient Vitals for the past 24 hrs:   BP Temp Temp src Pulse Resp SpO2 Height Weight   09/22/18 1325 98/69 97  F (36.1  C) Temporal 81 18 98 % 1.829 m (6') 70.3 kg (155 lb)       Physical Exam  Constitutional: Alert, non-verbal, baseline per caregiver.   CV:  brisk distal cap refill  Pulm: No respiratory distress  MSK: No foreign body palpated.  Skin: Skin is warm and dry. Pin-point injection site to right anterior proximal thigh. No surrounding erythema or lesions.   Psych: Normal mood and affect     Emergency Department Course       Imaging:  Radiographic findings were communicated with the patient and caregiver who  voiced understanding of the findings.    XR Femur 2 views, left:   Unremarkable exam as per radiology.       Emergency Department Course:    Nursing notes and vitals reviewed. (1346) I performed an exam of the patient as documented above.     IV inserted. Medicine administered as documented above. Blood drawn. This was sent to the lab for further testing, results above.    The patient was sent for a femur XR while in the emergency department, findings above.     (1501) I rechecked the patient and discussed the results of his imaging study.     (1516) Patient's family arrives here in emergency department.    Findings and plan explained to the Patient. Patient discharged home with instructions regarding supportive care, medications, and reasons to return. The importance of close follow-up was reviewed.     I personally reviewed the laboratory results with the Patient and answered all related questions prior to discharge.     Impression & Plan      Medical Decision Making:  Keyon Farias is a 58 year old male who presents with possible foreign body to the left upper leg.  The patient's home nurse reports he was injecting the patient's testosterone into the left upper leg and when he went to remove the needle it was not connected to the syringe.  He suspected the needle broke off in the patient's skin.  On my exam, there is no foreign body palpated and was unable to visualize a foreign body.  X-ray completed that was negative for foreign body.  I discussed with the patient's home nurse my findings.  At this time there is no indication for searching for the needle as I believe there will be more harm than benefit in doing so.  Home nurse plans to watch the area closely for any signs of redness.  I recommended looking closely for the needle in the the area where the procedure was completed as it may have fallen to the floor. The home nurse agrees with the plan and all questions/concerns were addressed prior to discharge.  Return precautions discussed.     Diagnosis:    ICD-10-CM    1. Foreign body (FB) in soft tissue M79.5     suspected, none found       Disposition:  discharged to home    Scribe Disclosure:  I, Pola Wheeler, am serving as a scribe on 9/22/2018 at 1:44 PM to personally document services performed by No att. providers found based on my observations and the provider's statements to me.       Pola Wheeler  9/22/2018    EMERGENCY DEPARTMENT       Germaine Delgado PA-C  09/22/18 1948

## 2018-09-23 LAB
BACTERIA SPEC CULT: ABNORMAL
BACTERIA SPEC CULT: NO GROWTH
Lab: ABNORMAL
Lab: NORMAL
SPECIMEN SOURCE: ABNORMAL
SPECIMEN SOURCE: NORMAL

## 2018-10-01 ENCOUNTER — MEDICAL CORRESPONDENCE (OUTPATIENT)
Dept: HEALTH INFORMATION MANAGEMENT | Facility: CLINIC | Age: 58
End: 2018-10-01

## 2018-10-17 ENCOUNTER — HOSPITAL ENCOUNTER (OUTPATIENT)
Dept: LAB | Facility: CLINIC | Age: 58
Discharge: HOME | End: 2018-10-17
Attending: PSYCHIATRY & NEUROLOGY | Admitting: PSYCHIATRY & NEUROLOGY
Payer: MEDICARE

## 2018-10-17 DIAGNOSIS — Z79.899 LONG-TERM USE OF HIGH-RISK MEDICATION: ICD-10-CM

## 2018-10-17 DIAGNOSIS — G40.011 PARTIAL IDIOPATHIC EPILEPSY WITH SEIZURES OF LOCALIZED ONSET, INTRACTABLE, WITH STATUS EPILEPTICUS (H): ICD-10-CM

## 2018-10-17 DIAGNOSIS — G40.011 LOCALIZATION-RELATED IDIOPATHIC EPILEPSY AND EPILEPTIC SYNDROMES WITH SEIZURES OF LOCALIZED ONSET, INTRACTABLE, WITH STATUS EPILEPTICUS (H): ICD-10-CM

## 2018-10-17 LAB
ALBUMIN SERPL-MCNC: 3.4 G/DL (ref 3.4–5)
ALP SERPL-CCNC: 109 U/L (ref 40–150)
ALT SERPL W P-5'-P-CCNC: 33 U/L (ref 0–70)
ANION GAP SERPL CALCULATED.3IONS-SCNC: 6 MMOL/L (ref 3–14)
AST SERPL W P-5'-P-CCNC: 23 U/L (ref 0–45)
BILIRUB SERPL-MCNC: 0.2 MG/DL (ref 0.2–1.3)
BUN SERPL-MCNC: 14 MG/DL (ref 7–30)
CALCIUM SERPL-MCNC: 8.8 MG/DL (ref 8.5–10.1)
CARBAMAZEPINE SERPL-MCNC: 17.6 MG/L (ref 4–12)
CHLORIDE SERPL-SCNC: 94 MMOL/L (ref 94–109)
CO2 SERPL-SCNC: 30 MMOL/L (ref 20–32)
CREAT SERPL-MCNC: 0.75 MG/DL (ref 0.66–1.25)
ERYTHROCYTE [DISTWIDTH] IN BLOOD BY AUTOMATED COUNT: 13.4 % (ref 10–15)
GFR SERPL CREATININE-BSD FRML MDRD: >90 ML/MIN/1.7M2
GLUCOSE SERPL-MCNC: 93 MG/DL (ref 70–99)
HCT VFR BLD AUTO: 38.3 % (ref 40–53)
HGB BLD-MCNC: 13.2 G/DL (ref 13.3–17.7)
MCH RBC QN AUTO: 30 PG (ref 26.5–33)
MCHC RBC AUTO-ENTMCNC: 34.5 G/DL (ref 31.5–36.5)
MCV RBC AUTO: 87 FL (ref 78–100)
PHENYTOIN SERPL-MCNC: <0.4 MG/L (ref 10–20)
PLATELET # BLD AUTO: 157 10E9/L (ref 150–450)
POTASSIUM SERPL-SCNC: 4.5 MMOL/L (ref 3.4–5.3)
PROT SERPL-MCNC: 8.2 G/DL (ref 6.8–8.8)
RBC # BLD AUTO: 4.4 10E12/L (ref 4.4–5.9)
SODIUM SERPL-SCNC: 130 MMOL/L (ref 133–144)
WBC # BLD AUTO: 9.6 10E9/L (ref 4–11)

## 2018-10-17 PROCEDURE — 36415 COLL VENOUS BLD VENIPUNCTURE: CPT | Performed by: PSYCHIATRY & NEUROLOGY

## 2018-10-17 PROCEDURE — 85027 COMPLETE CBC AUTOMATED: CPT | Performed by: PSYCHIATRY & NEUROLOGY

## 2018-10-17 PROCEDURE — 80053 COMPREHEN METABOLIC PANEL: CPT | Performed by: PSYCHIATRY & NEUROLOGY

## 2018-10-17 PROCEDURE — 80186 ASSAY OF PHENYTOIN FREE: CPT | Performed by: PSYCHIATRY & NEUROLOGY

## 2018-10-17 PROCEDURE — 80185 ASSAY OF PHENYTOIN TOTAL: CPT | Performed by: PSYCHIATRY & NEUROLOGY

## 2018-10-17 PROCEDURE — 80156 ASSAY CARBAMAZEPINE TOTAL: CPT | Performed by: PSYCHIATRY & NEUROLOGY

## 2018-10-19 LAB — PHENYTOIN FREE SERPL-MCNC: <0.5 UG/ML

## 2018-11-01 ENCOUNTER — TELEPHONE (OUTPATIENT)
Dept: INTERVENTIONAL RADIOLOGY/VASCULAR | Facility: CLINIC | Age: 58
End: 2018-11-01

## 2018-11-01 NOTE — TELEPHONE ENCOUNTER
Interventional Radiology    Received a call from Keyon's mother and caregiver Savannah today regarding his GJ tube. She and the home care RN felt it might be infected and wanted the GJ tube to be exchanged. The GJ tube was functioning without any problems. She spoke with the IR RN who suggested that the patient see his PCP for possible antibiotics. Savannah called the PCP who felt that IR should evaluate the tube site.     I spoke with the home care RN Seun today at 1145 (). He stated that he was concerned that there were more secretions coming from around the tube and that it appeared slightly reddened and swollen at the top half of the tube. The secretions were normally sero sang, but this week appeared purulent and then bloody at times. He did not feel it was granulation tissue. The patient denied pain at the tube site.     Reviewed with Dr Dunn, IR. The tube doesn't need exchanging as it is working well. The RN and family have not noticed any change in functioning with the tube. Feel that this would be more appropriately evaluated in the ED as the patient may need lab work and a CT scan if it is truly felt to be an abscess. This was relayed to the home care RN who would let the mother know. It was also suggested they could watch it to see if there is improvement but the RN felt this was a change and didn't want to just wait.     Thanks [facility] Interventional Radiology CNP ([phone]) (phone [phone])

## 2018-11-02 ENCOUNTER — APPOINTMENT (OUTPATIENT)
Dept: GENERAL RADIOLOGY | Facility: CLINIC | Age: 58
DRG: 871 | End: 2018-11-02
Attending: EMERGENCY MEDICINE
Payer: MEDICARE

## 2018-11-02 ENCOUNTER — HOSPITAL ENCOUNTER (INPATIENT)
Facility: CLINIC | Age: 58
LOS: 2 days | Discharge: HOME HEALTH | DRG: 871 | End: 2018-11-04
Attending: EMERGENCY MEDICINE | Admitting: HOSPITALIST
Payer: MEDICARE

## 2018-11-02 DIAGNOSIS — J69.0 ASPIRATION PNEUMONIA OF RIGHT LUNG, UNSPECIFIED ASPIRATION PNEUMONIA TYPE, UNSPECIFIED PART OF LUNG (H): Primary | ICD-10-CM

## 2018-11-02 DIAGNOSIS — J18.9 PNEUMONIA DUE TO INFECTIOUS ORGANISM, UNSPECIFIED LATERALITY, UNSPECIFIED PART OF LUNG: ICD-10-CM

## 2018-11-02 DIAGNOSIS — R65.20 SEVERE SEPSIS (H): ICD-10-CM

## 2018-11-02 DIAGNOSIS — A41.9 SEVERE SEPSIS (H): ICD-10-CM

## 2018-11-02 DIAGNOSIS — S06.9X9D TRAUMATIC BRAIN INJURY WITH LOSS OF CONSCIOUSNESS, SUBSEQUENT ENCOUNTER: ICD-10-CM

## 2018-11-02 LAB
ALBUMIN SERPL-MCNC: 3.6 G/DL (ref 3.4–5)
ALBUMIN UR-MCNC: 10 MG/DL
ALP SERPL-CCNC: 109 U/L (ref 40–150)
ALT SERPL W P-5'-P-CCNC: 31 U/L (ref 0–70)
AMORPH CRY #/AREA URNS HPF: ABNORMAL /HPF
ANION GAP SERPL CALCULATED.3IONS-SCNC: 8 MMOL/L (ref 3–14)
ANION GAP SERPL CALCULATED.3IONS-SCNC: 8 MMOL/L (ref 3–14)
APPEARANCE UR: ABNORMAL
AST SERPL W P-5'-P-CCNC: 17 U/L (ref 0–45)
BASOPHILS # BLD AUTO: 0.1 10E9/L (ref 0–0.2)
BASOPHILS NFR BLD AUTO: 0.4 %
BILIRUB SERPL-MCNC: 0.2 MG/DL (ref 0.2–1.3)
BILIRUB UR QL STRIP: NEGATIVE
BUN SERPL-MCNC: 11 MG/DL (ref 7–30)
BUN SERPL-MCNC: 14 MG/DL (ref 7–30)
CALCIUM SERPL-MCNC: 8.4 MG/DL (ref 8.5–10.1)
CALCIUM SERPL-MCNC: 9.1 MG/DL (ref 8.5–10.1)
CHLORIDE SERPL-SCNC: 103 MMOL/L (ref 94–109)
CHLORIDE SERPL-SCNC: 93 MMOL/L (ref 94–109)
CO2 BLDCOV-SCNC: 32 MMOL/L (ref 21–28)
CO2 SERPL-SCNC: 25 MMOL/L (ref 20–32)
CO2 SERPL-SCNC: 29 MMOL/L (ref 20–32)
COLOR UR AUTO: YELLOW
CREAT SERPL-MCNC: 0.71 MG/DL (ref 0.66–1.25)
CREAT SERPL-MCNC: 0.74 MG/DL (ref 0.66–1.25)
DIFFERENTIAL METHOD BLD: ABNORMAL
EOSINOPHIL # BLD AUTO: 0.8 10E9/L (ref 0–0.7)
EOSINOPHIL NFR BLD AUTO: 5.3 %
ERYTHROCYTE [DISTWIDTH] IN BLOOD BY AUTOMATED COUNT: 13.2 % (ref 10–15)
FLUAV+FLUBV AG SPEC QL: NEGATIVE
FLUAV+FLUBV AG SPEC QL: NEGATIVE
GFR SERPL CREATININE-BSD FRML MDRD: >90 ML/MIN/1.7M2
GFR SERPL CREATININE-BSD FRML MDRD: >90 ML/MIN/1.7M2
GLUCOSE SERPL-MCNC: 100 MG/DL (ref 70–99)
GLUCOSE SERPL-MCNC: 102 MG/DL (ref 70–99)
GLUCOSE UR STRIP-MCNC: 70 MG/DL
HCT VFR BLD AUTO: 40.1 % (ref 40–53)
HGB BLD-MCNC: 13.8 G/DL (ref 13.3–17.7)
HGB UR QL STRIP: NEGATIVE
IMM GRANULOCYTES # BLD: 0 10E9/L (ref 0–0.4)
IMM GRANULOCYTES NFR BLD: 0.3 %
KETONES UR STRIP-MCNC: NEGATIVE MG/DL
LACTATE BLD-SCNC: 3.1 MMOL/L (ref 0.7–2)
LACTATE BLD-SCNC: 3.8 MMOL/L (ref 0.7–2)
LACTATE BLD-SCNC: 6.8 MMOL/L (ref 0.7–2.1)
LACTATE SERPL-SCNC: 3.9 MMOL/L (ref 0.4–2)
LEUKOCYTE ESTERASE UR QL STRIP: NEGATIVE
LYMPHOCYTES # BLD AUTO: 3.7 10E9/L (ref 0.8–5.3)
LYMPHOCYTES NFR BLD AUTO: 24.7 %
MCH RBC QN AUTO: 30.2 PG (ref 26.5–33)
MCHC RBC AUTO-ENTMCNC: 34.4 G/DL (ref 31.5–36.5)
MCV RBC AUTO: 88 FL (ref 78–100)
MONOCYTES # BLD AUTO: 0.5 10E9/L (ref 0–1.3)
MONOCYTES NFR BLD AUTO: 3.1 %
NEUTROPHILS # BLD AUTO: 9.9 10E9/L (ref 1.6–8.3)
NEUTROPHILS NFR BLD AUTO: 66.2 %
NITRATE UR QL: NEGATIVE
NRBC # BLD AUTO: 0 10*3/UL
NRBC BLD AUTO-RTO: 0 /100
PCO2 BLDV: 56 MM HG (ref 40–50)
PH BLDV: 7.35 PH (ref 7.32–7.43)
PH UR STRIP: 7 PH (ref 5–7)
PLATELET # BLD AUTO: 217 10E9/L (ref 150–450)
PO2 BLDV: 15 MM HG (ref 25–47)
POTASSIUM SERPL-SCNC: 4.1 MMOL/L (ref 3.4–5.3)
POTASSIUM SERPL-SCNC: 4.5 MMOL/L (ref 3.4–5.3)
PROCALCITONIN SERPL-MCNC: <0.05 NG/ML
PROT SERPL-MCNC: 8.3 G/DL (ref 6.8–8.8)
RBC # BLD AUTO: 4.57 10E12/L (ref 4.4–5.9)
RBC #/AREA URNS AUTO: 0 /HPF (ref 0–2)
SAO2 % BLDV FROM PO2: 17 %
SODIUM SERPL-SCNC: 130 MMOL/L (ref 133–144)
SODIUM SERPL-SCNC: 136 MMOL/L (ref 133–144)
SOURCE: ABNORMAL
SP GR UR STRIP: 1.01 (ref 1–1.03)
SPECIMEN SOURCE: NORMAL
UROBILINOGEN UR STRIP-MCNC: NORMAL MG/DL (ref 0–2)
WBC # BLD AUTO: 15 10E9/L (ref 4–11)
WBC #/AREA URNS AUTO: 1 /HPF (ref 0–5)

## 2018-11-02 PROCEDURE — 96375 TX/PRO/DX INJ NEW DRUG ADDON: CPT

## 2018-11-02 PROCEDURE — 96365 THER/PROPH/DIAG IV INF INIT: CPT

## 2018-11-02 PROCEDURE — 83605 ASSAY OF LACTIC ACID: CPT

## 2018-11-02 PROCEDURE — 36415 COLL VENOUS BLD VENIPUNCTURE: CPT | Performed by: HOSPITALIST

## 2018-11-02 PROCEDURE — 83605 ASSAY OF LACTIC ACID: CPT | Performed by: EMERGENCY MEDICINE

## 2018-11-02 PROCEDURE — 25000132 ZZH RX MED GY IP 250 OP 250 PS 637: Mod: GY | Performed by: HOSPITALIST

## 2018-11-02 PROCEDURE — 84145 PROCALCITONIN (PCT): CPT | Performed by: EMERGENCY MEDICINE

## 2018-11-02 PROCEDURE — 82803 BLOOD GASES ANY COMBINATION: CPT

## 2018-11-02 PROCEDURE — 99223 1ST HOSP IP/OBS HIGH 75: CPT | Mod: AI | Performed by: HOSPITALIST

## 2018-11-02 PROCEDURE — 25000128 H RX IP 250 OP 636: Performed by: EMERGENCY MEDICINE

## 2018-11-02 PROCEDURE — G0463 HOSPITAL OUTPT CLINIC VISIT: HCPCS

## 2018-11-02 PROCEDURE — 71046 X-RAY EXAM CHEST 2 VIEWS: CPT

## 2018-11-02 PROCEDURE — 12000000 ZZH R&B MED SURG/OB

## 2018-11-02 PROCEDURE — 96367 TX/PROPH/DG ADDL SEQ IV INF: CPT

## 2018-11-02 PROCEDURE — 40000886 ZZH STATISTIC STEP DOWN HRS DAY

## 2018-11-02 PROCEDURE — 85025 COMPLETE CBC W/AUTO DIFF WBC: CPT | Performed by: EMERGENCY MEDICINE

## 2018-11-02 PROCEDURE — 81001 URINALYSIS AUTO W/SCOPE: CPT | Performed by: EMERGENCY MEDICINE

## 2018-11-02 PROCEDURE — 25000128 H RX IP 250 OP 636: Performed by: HOSPITALIST

## 2018-11-02 PROCEDURE — 87040 BLOOD CULTURE FOR BACTERIA: CPT | Performed by: EMERGENCY MEDICINE

## 2018-11-02 PROCEDURE — 83605 ASSAY OF LACTIC ACID: CPT | Performed by: HOSPITALIST

## 2018-11-02 PROCEDURE — A9270 NON-COVERED ITEM OR SERVICE: HCPCS | Mod: GY | Performed by: EMERGENCY MEDICINE

## 2018-11-02 PROCEDURE — 96366 THER/PROPH/DIAG IV INF ADDON: CPT

## 2018-11-02 PROCEDURE — 87804 INFLUENZA ASSAY W/OPTIC: CPT | Performed by: EMERGENCY MEDICINE

## 2018-11-02 PROCEDURE — 25000132 ZZH RX MED GY IP 250 OP 250 PS 637: Mod: GY

## 2018-11-02 PROCEDURE — 80053 COMPREHEN METABOLIC PANEL: CPT | Performed by: EMERGENCY MEDICINE

## 2018-11-02 PROCEDURE — A9270 NON-COVERED ITEM OR SERVICE: HCPCS | Mod: GY | Performed by: HOSPITALIST

## 2018-11-02 PROCEDURE — 25000132 ZZH RX MED GY IP 250 OP 250 PS 637: Mod: GY | Performed by: EMERGENCY MEDICINE

## 2018-11-02 PROCEDURE — 27210429 ZZH NUTRITION PRODUCT INTERMEDIATE LITER

## 2018-11-02 PROCEDURE — 80048 BASIC METABOLIC PNL TOTAL CA: CPT | Performed by: HOSPITALIST

## 2018-11-02 PROCEDURE — A9270 NON-COVERED ITEM OR SERVICE: HCPCS | Mod: GY

## 2018-11-02 PROCEDURE — 99285 EMERGENCY DEPT VISIT HI MDM: CPT | Mod: 25

## 2018-11-02 RX ORDER — POLYETHYLENE GLYCOL 3350 17 G/17G
17 POWDER, FOR SOLUTION ORAL DAILY PRN
Status: DISCONTINUED | OUTPATIENT
Start: 2018-11-02 | End: 2018-11-04 | Stop reason: HOSPADM

## 2018-11-02 RX ORDER — LEVOTHYROXINE SODIUM 137 UG/1
137 TABLET ORAL DAILY
Status: DISCONTINUED | OUTPATIENT
Start: 2018-11-02 | End: 2018-11-04 | Stop reason: HOSPADM

## 2018-11-02 RX ORDER — BACITRACIN ZINC 500 [USP'U]/G
OINTMENT TOPICAL DAILY PRN
Status: DISCONTINUED | OUTPATIENT
Start: 2018-11-02 | End: 2018-11-04 | Stop reason: HOSPADM

## 2018-11-02 RX ORDER — VANCOMYCIN HYDROCHLORIDE 1 G/200ML
1000 INJECTION, SOLUTION INTRAVENOUS EVERY 8 HOURS
Status: DISCONTINUED | OUTPATIENT
Start: 2018-11-02 | End: 2018-11-03 | Stop reason: DRUGHIGH

## 2018-11-02 RX ORDER — AMOXICILLIN 250 MG
1 CAPSULE ORAL 2 TIMES DAILY PRN
Status: DISCONTINUED | OUTPATIENT
Start: 2018-11-02 | End: 2018-11-04 | Stop reason: HOSPADM

## 2018-11-02 RX ORDER — CARBAMAZEPINE 100 MG/5ML
150 SUSPENSION ORAL EVERY 6 HOURS
Status: DISCONTINUED | OUTPATIENT
Start: 2018-11-02 | End: 2018-11-04 | Stop reason: HOSPADM

## 2018-11-02 RX ORDER — ACETAMINOPHEN 325 MG/1
TABLET ORAL
Status: COMPLETED
Start: 2018-11-02 | End: 2018-11-02

## 2018-11-02 RX ORDER — ALBUTEROL SULFATE 0.83 MG/ML
2.5 SOLUTION RESPIRATORY (INHALATION) EVERY 4 HOURS PRN
Status: DISCONTINUED | OUTPATIENT
Start: 2018-11-02 | End: 2018-11-04 | Stop reason: HOSPADM

## 2018-11-02 RX ORDER — LANOLIN ALCOHOL/MO/W.PET/CERES
6 CREAM (GRAM) TOPICAL
Status: DISCONTINUED | OUTPATIENT
Start: 2018-11-02 | End: 2018-11-04 | Stop reason: HOSPADM

## 2018-11-02 RX ORDER — WHEAT DEXTRIN 3 G/3.8 G
POWDER (GRAM) ORAL DAILY
Status: DISCONTINUED | OUTPATIENT
Start: 2018-11-02 | End: 2018-11-02

## 2018-11-02 RX ORDER — AMOXICILLIN 250 MG
2 CAPSULE ORAL 2 TIMES DAILY PRN
Status: DISCONTINUED | OUTPATIENT
Start: 2018-11-02 | End: 2018-11-04 | Stop reason: HOSPADM

## 2018-11-02 RX ORDER — LIDOCAINE 40 MG/G
CREAM TOPICAL
Status: DISCONTINUED | OUTPATIENT
Start: 2018-11-02 | End: 2018-11-04 | Stop reason: HOSPADM

## 2018-11-02 RX ORDER — CEFEPIME HYDROCHLORIDE 1 G/1
1 INJECTION, POWDER, FOR SOLUTION INTRAMUSCULAR; INTRAVENOUS EVERY 8 HOURS
Status: DISCONTINUED | OUTPATIENT
Start: 2018-11-02 | End: 2018-11-04 | Stop reason: HOSPADM

## 2018-11-02 RX ORDER — GUAR GUM
1 PACKET (EA) ORAL DAILY
Status: DISCONTINUED | OUTPATIENT
Start: 2018-11-02 | End: 2018-11-04 | Stop reason: HOSPADM

## 2018-11-02 RX ORDER — CARBAMAZEPINE 100 MG/5ML
125 SUSPENSION ORAL EVERY 6 HOURS
Status: DISCONTINUED | OUTPATIENT
Start: 2018-11-02 | End: 2018-11-02

## 2018-11-02 RX ORDER — NITROGLYCERIN 0.4 MG/1
0.4 TABLET SUBLINGUAL EVERY 5 MIN PRN
Status: DISCONTINUED | OUTPATIENT
Start: 2018-11-02 | End: 2018-11-02

## 2018-11-02 RX ORDER — SODIUM CHLORIDE 9 MG/ML
INJECTION, SOLUTION INTRAVENOUS CONTINUOUS
Status: ACTIVE | OUTPATIENT
Start: 2018-11-02 | End: 2018-11-02

## 2018-11-02 RX ORDER — IBUPROFEN 100 MG/5ML
600 SUSPENSION, ORAL (FINAL DOSE FORM) ORAL ONCE
Status: COMPLETED | OUTPATIENT
Start: 2018-11-02 | End: 2018-11-02

## 2018-11-02 RX ORDER — TESTOSTERONE CYPIONATE 200 MG/ML
100 INJECTION, SOLUTION INTRAMUSCULAR
Status: DISCONTINUED | OUTPATIENT
Start: 2018-11-09 | End: 2018-11-02

## 2018-11-02 RX ORDER — PIPERACILLIN SODIUM, TAZOBACTAM SODIUM 4; .5 G/20ML; G/20ML
4.5 INJECTION, POWDER, LYOPHILIZED, FOR SOLUTION INTRAVENOUS ONCE
Status: COMPLETED | OUTPATIENT
Start: 2018-11-02 | End: 2018-11-02

## 2018-11-02 RX ORDER — SACCHAROMYCES BOULARDII 250 MG
250 CAPSULE ORAL 2 TIMES DAILY
Status: DISCONTINUED | OUTPATIENT
Start: 2018-11-02 | End: 2018-11-04 | Stop reason: HOSPADM

## 2018-11-02 RX ORDER — SODIUM CHLORIDE, SODIUM LACTATE, POTASSIUM CHLORIDE, CALCIUM CHLORIDE 600; 310; 30; 20 MG/100ML; MG/100ML; MG/100ML; MG/100ML
INJECTION, SOLUTION INTRAVENOUS CONTINUOUS
Status: DISCONTINUED | OUTPATIENT
Start: 2018-11-02 | End: 2018-11-02

## 2018-11-02 RX ORDER — ACETAMINOPHEN 325 MG/1
975 TABLET ORAL ONCE
Status: DISCONTINUED | OUTPATIENT
Start: 2018-11-02 | End: 2018-11-02

## 2018-11-02 RX ORDER — CARBAMAZEPINE 100 MG/5ML
125 SUSPENSION ORAL ONCE
Status: COMPLETED | OUTPATIENT
Start: 2018-11-02 | End: 2018-11-02

## 2018-11-02 RX ORDER — ONDANSETRON 2 MG/ML
4 INJECTION INTRAMUSCULAR; INTRAVENOUS ONCE
Status: COMPLETED | OUTPATIENT
Start: 2018-11-02 | End: 2018-11-02

## 2018-11-02 RX ORDER — TESTOSTERONE CYPIONATE 200 MG/ML
76 INJECTION, SOLUTION INTRAMUSCULAR
Status: DISCONTINUED | OUTPATIENT
Start: 2018-11-09 | End: 2018-11-04 | Stop reason: HOSPADM

## 2018-11-02 RX ORDER — ACETAMINOPHEN 500 MG
1000 TABLET ORAL EVERY 6 HOURS PRN
Status: DISCONTINUED | OUTPATIENT
Start: 2018-11-02 | End: 2018-11-04 | Stop reason: HOSPADM

## 2018-11-02 RX ORDER — CEFEPIME HYDROCHLORIDE 1 G/1
1 INJECTION, POWDER, FOR SOLUTION INTRAMUSCULAR; INTRAVENOUS EVERY 12 HOURS
Status: DISCONTINUED | OUTPATIENT
Start: 2018-11-02 | End: 2018-11-02

## 2018-11-02 RX ORDER — NALOXONE HYDROCHLORIDE 0.4 MG/ML
.1-.4 INJECTION, SOLUTION INTRAMUSCULAR; INTRAVENOUS; SUBCUTANEOUS
Status: DISCONTINUED | OUTPATIENT
Start: 2018-11-02 | End: 2018-11-04 | Stop reason: HOSPADM

## 2018-11-02 RX ORDER — DOCUSATE SODIUM 100 MG/1
100 CAPSULE, LIQUID FILLED ORAL 2 TIMES DAILY
Status: DISCONTINUED | OUTPATIENT
Start: 2018-11-02 | End: 2018-11-02

## 2018-11-02 RX ORDER — HYDROCORTISONE 10 MG/1
10 TABLET ORAL EVERY EVENING
Status: DISCONTINUED | OUTPATIENT
Start: 2018-11-02 | End: 2018-11-04 | Stop reason: HOSPADM

## 2018-11-02 RX ORDER — CARBAMAZEPINE 100 MG/5ML
150 SUSPENSION ORAL 3 TIMES DAILY
COMMUNITY
End: 2023-10-10

## 2018-11-02 RX ADMIN — DOCUSATE SODIUM 100 MG: 50 LIQUID ORAL at 21:12

## 2018-11-02 RX ADMIN — VANCOMYCIN HYDROCHLORIDE 1000 MG: 1 INJECTION, SOLUTION INTRAVENOUS at 19:15

## 2018-11-02 RX ADMIN — BRIVARACETAM 100 MG: 10 SOLUTION ORAL at 21:33

## 2018-11-02 RX ADMIN — CARBAMAZEPINE 125 MG: 100 SUSPENSION ORAL at 05:36

## 2018-11-02 RX ADMIN — CEFEPIME HYDROCHLORIDE 1 G: 1 INJECTION, POWDER, FOR SOLUTION INTRAMUSCULAR; INTRAVENOUS at 17:41

## 2018-11-02 RX ADMIN — SODIUM CHLORIDE: 9 INJECTION, SOLUTION INTRAVENOUS at 09:59

## 2018-11-02 RX ADMIN — CEFEPIME HYDROCHLORIDE 1 G: 1 INJECTION, POWDER, FOR SOLUTION INTRAMUSCULAR; INTRAVENOUS at 09:59

## 2018-11-02 RX ADMIN — HYDROCORTISONE 10 MG: 10 TABLET ORAL at 21:12

## 2018-11-02 RX ADMIN — ACETAMINOPHEN 975 MG: 325 TABLET, FILM COATED ORAL at 01:14

## 2018-11-02 RX ADMIN — POTASSIUM & SODIUM PHOSPHATES POWDER PACK 280-160-250 MG 1 PACKET: 280-160-250 PACK at 09:00

## 2018-11-02 RX ADMIN — RANITIDINE HYDROCHLORIDE 150 MG: 150 SOLUTION ORAL at 11:45

## 2018-11-02 RX ADMIN — Medication 250 MG: at 21:12

## 2018-11-02 RX ADMIN — POTASSIUM & SODIUM PHOSPHATES POWDER PACK 280-160-250 MG 1 PACKET: 280-160-250 PACK at 21:12

## 2018-11-02 RX ADMIN — VANCOMYCIN HYDROCHLORIDE 1000 MG: 1 INJECTION, SOLUTION INTRAVENOUS at 11:44

## 2018-11-02 RX ADMIN — VITAMIN D, TAB 1000IU (100/BT) 2000 UNITS: 25 TAB at 09:00

## 2018-11-02 RX ADMIN — SODIUM CHLORIDE, POTASSIUM CHLORIDE, SODIUM LACTATE AND CALCIUM CHLORIDE 2244 ML: 600; 310; 30; 20 INJECTION, SOLUTION INTRAVENOUS at 01:57

## 2018-11-02 RX ADMIN — SODIUM CHLORIDE, POTASSIUM CHLORIDE, SODIUM LACTATE AND CALCIUM CHLORIDE: 600; 310; 30; 20 INJECTION, SOLUTION INTRAVENOUS at 06:14

## 2018-11-02 RX ADMIN — Medication 250 MG: at 09:01

## 2018-11-02 RX ADMIN — ONDANSETRON 4 MG: 2 INJECTION INTRAMUSCULAR; INTRAVENOUS at 01:36

## 2018-11-02 RX ADMIN — SODIUM CHLORIDE 1000 ML: 9 INJECTION, SOLUTION INTRAVENOUS at 04:50

## 2018-11-02 RX ADMIN — PIPERACILLIN SODIUM,TAZOBACTAM SODIUM 4.5 G: 4; .5 INJECTION, POWDER, FOR SOLUTION INTRAVENOUS at 02:01

## 2018-11-02 RX ADMIN — CARBAMAZEPINE 150 MG: 100 SUSPENSION ORAL at 11:44

## 2018-11-02 RX ADMIN — VANCOMYCIN HYDROCHLORIDE 1750 MG: 10 INJECTION, POWDER, LYOPHILIZED, FOR SOLUTION INTRAVENOUS at 02:52

## 2018-11-02 RX ADMIN — HYDROCORTISONE SODIUM SUCCINATE 100 MG: 100 INJECTION, POWDER, FOR SOLUTION INTRAMUSCULAR; INTRAVENOUS at 02:48

## 2018-11-02 RX ADMIN — HYDROCORTISONE 15 MG: 5 TABLET ORAL at 09:00

## 2018-11-02 RX ADMIN — BRIVARACETAM 100 MG: 10 SOLUTION ORAL at 10:30

## 2018-11-02 RX ADMIN — RANITIDINE HYDROCHLORIDE 150 MG: 150 SOLUTION ORAL at 21:12

## 2018-11-02 RX ADMIN — IBUPROFEN 600 MG: 200 SUSPENSION ORAL at 02:59

## 2018-11-02 RX ADMIN — POTASSIUM & SODIUM PHOSPHATES POWDER PACK 280-160-250 MG 1 PACKET: 280-160-250 PACK at 15:13

## 2018-11-02 RX ADMIN — Medication 1 PACKET: at 11:44

## 2018-11-02 RX ADMIN — ENOXAPARIN SODIUM 40 MG: 40 INJECTION SUBCUTANEOUS at 09:03

## 2018-11-02 RX ADMIN — CARBAMAZEPINE 150 MG: 100 SUSPENSION ORAL at 17:42

## 2018-11-02 RX ADMIN — LEVOTHYROXINE SODIUM 137 MCG: 137 TABLET ORAL at 09:01

## 2018-11-02 RX ADMIN — SODIUM CHLORIDE 1000 ML: 9 INJECTION, SOLUTION INTRAVENOUS at 01:36

## 2018-11-02 ASSESSMENT — ENCOUNTER SYMPTOMS
FEVER: 1
VOMITING: 1
NAUSEA: 1
COUGH: 1

## 2018-11-02 ASSESSMENT — ACTIVITIES OF DAILY LIVING (ADL)
ADLS_ACUITY_SCORE: 44

## 2018-11-02 NOTE — CONSULTS
"CLINICAL NUTRITION SERVICES  -  ASSESSMENT NOTE      Recommendations Ordered by Registered Dietitian (RD):    Isosource 1.5 via GJ tube at 100 mL/hr for 12hrs (7:00am to 7:00pm), to provide: 1800 kcal (24kcal/kg), 82g protein (1.1g/kg), 211g CHO, 18g fiber and 912mL water.  H2O flushes: 120 mL before and after feeding.    Continue Nutrasource Fiber per MD order. Total fiber (including TF) = 21 gm/day.     Future/Additional Recommendations:   Once off IVF, will need add'l H2O flushes of 110 mL q 4 hours. Total fluid = 1800 mL/day.      Malnutrition (11/2):   % Weight Loss:  None noted  % Intake:  No decreased intake noted  Subcutaneous Fat Loss:  None observed  Muscle Loss:  None observed  Fluid Retention:  None noted    Malnutrition Diagnosis: Patient does not meet two of the above criteria necessary for diagnosing malnutrition        REASON FOR ASSESSMENT  Keyon Farias is a 58 year old male seen by Registered Dietitian for Provider Order - tube feeding - mom reports 5 cans jevity daily. 100/hr while upright, 60 while semirecumbent. thanks      NUTRITION HISTORY  - Information obtained from the EMR.  Pt is well-known to our service from previous admissions.  Pt has been on TF via GJ tube since 8/2016; his last tube was placed 7/6/18, 16Fr TORY.  He lives with his mother, who is his caretaker.  He has been on Jevity 1.5 (5 cans daily) x 12 hr during the day, 7am - 7pm, at 100 ml/hr unless he is semirecumbent at which time it is decreased to 60 mL/hr.  This provides 1778kcal, 76g protein, 255g CHO, 27g fiber and 900mL free water  -Mother would like to keep rate at no more than 100mL/hr     H2O flushes with meds and 120 mL 4 times daily.    Pt also on Nutrasource Fiber daily, and Neutraphos TID      PHYSICAL FINDINGS  Observed  No nutrition-related physical findings observed  Obtained from Chart/Interdisciplinary Team   Nonblanchable redness to buttocks, WOC consulted    ANTHROPOMETRICS  Height: 6' 0\"  Weight: 164 lbs " 3.88 oz (74.5) - 11/2  Body mass index is 22.28 kg/(m^2).  Weight Status:  Normal BMI  IBW: 81 kg +/- 10%  % IBW: 92%  Weight History: Inconsistent weights but his usual wt appears to be ~165-169.  Wt Readings from Last 10 Encounters:   11/02/18 74.5 kg (164 lb 3.9 oz)   09/22/18 70.3 kg (155 lb)   09/21/18 78 kg (171 lb 15.3 oz)   09/03/18 72.3 kg (159 lb 6.3 oz)   07/07/18 74.7 kg (164 lb 10.9 oz)   05/19/18 71.8 kg (158 lb 3.2 oz)   05/05/18 77.4 kg (170 lb 10.2 oz)   04/04/18 72.6 kg (160 lb)   03/13/18 70.3 kg (155 lb)   02/21/18 73 kg (161 lb)         LABS  Alb 3.6 (WNL)    MEDICATIONS  LR IVF at 150 mL/hr  Neutraphos TID  Nutrasource Fiber daily  Colace, Miralax, Senokot      ASSESSED NUTRITION NEEDS PER APPROVED PRACTICE GUIDELINES:  Dosing Weight 74.5 kg - 11/2  Estimated Energy Needs: 1582-7752 kcals (25-30 Kcal/Kg)  Justification: maintenance  Estimated Protein Needs:  grams protein (1.2-1.5 g pro/Kg)  Justification: preservation of lean body mass  Estimated Fluid Needs: 5398-7166  mL (1 mL/Kcal)  Justification: maintenance    MALNUTRITION:  % Weight Loss:  None noted  % Intake:  No decreased intake noted  Subcutaneous Fat Loss:  None observed  Muscle Loss:  None observed  Fluid Retention:  None noted    Malnutrition Diagnosis: Patient does not meet two of the above criteria necessary for diagnosing malnutrition      NUTRITION DIAGNOSIS:  No nutrition diagnosis identified at this time      NUTRITION INTERVENTIONS  Recommendations / Nutrition Prescription   Isosource 1.5 via GJ tube at 100 mL/hr for 12hrs (7:00am to 7:00pm), to provide: 1800 kcal (24kcal/kg), 82g protein (1.1g/kg), 211g CHO, 18g fiber and 912mL water.  H2O flushes: 120 mL before and after feeding.    Continue Nutrasource Fiber per MD order. Total fiber (including TF) = 21 gm/day.      Implementation  Nutrition education: No education needs  EN Schedule - ordered the above TF.      Nutrition Goals  EN will continue to meet  needs.      MONITORING AND EVALUATION:  Progress towards goals will be monitored and evaluated per protocol and Practice Guidelines    Unique Schultz RD  Pager [phone] (M-F)            [phone] (W/E & Hol)

## 2018-11-02 NOTE — ED NOTES
Patient incontinent of urine. Patient cleaned up and linens changed. New brief applied. Patient has redness to sacrum/coccyx area. Pillow placed under patient on left buttock. Patient repositioned in bed. Call light within reach.
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
## 2018-11-02 NOTE — IP AVS SNAPSHOT
[facility]    [address] [place] [address]    Phone:  [phone]                                       After Visit Summary   11/2/2018    Keyon Farias    MRN: [id number]           After Visit Summary Signature Page     I have received my discharge instructions, and my questions have been answered. I have discussed any challenges I see with this plan with the nurse or doctor.    ..........................................................................................................................................  Patient/Patient Representative Signature      ..........................................................................................................................................  Patient Representative Print Name and Relationship to Patient    ..................................................               ................................................  Date                                   Time    ..........................................................................................................................................  Reviewed by Signature/Title    ...................................................              ..............................................  Date                                               Time          22EPIC Rev 08/18

## 2018-11-02 NOTE — PLAN OF CARE
Problem: Patient Care Overview  Goal: Plan of Care/Patient Progress Review  Pt is nonverbal at baseline. Pt was laughing/smiling and giving the thumbs up most of the shift. VSS. Tele was NSR. Does not seem to be in pain. IMC was discontinued, lactic acid was trending down. Pt is incontinent of bowel and bladder. IVF infusing. WOC saw pt this AM, orders placed. Tolerating TF at 60 ml/ hr. Up with A2 and lift.

## 2018-11-02 NOTE — IP AVS SNAPSHOT
MRN:[id number]                      After Visit Summary   11/2/2018    Keyon Farias    MRN: [id number]           Thank you!     Thank you for choosing [facility] for your care. Our goal is always to provide you with excellent care. Hearing back from our patients is one way we can continue to improve our services. Please take a few minutes to complete the written survey that you may receive in the mail after you visit with us. Thank you!        Patient Information     Date Of Birth          1960        Designated Caregiver       Most Recent Value    Caregiver    Will someone help with your care after discharge? yes    Name of designated caregiver niharika    Phone number of caregiver see chart    Caregiver address same as pt      About your hospital stay     You were admitted on:  November 2, 2018 You last received care in the:  [place] [facility] [address] [facility]    You were discharged on:  November 4, 2018        Reason for your hospital stay       You were admitted with fevers and were found to likely have recurrent aspiration pneumonia. You were started on IV antibiotics and given fluids. You eventually did not require oxygen supplementation and were able to be transitioned to medication via your PEG. Your feeding tube did require some manipulation on day of discharge at the J portion was clogged.                  Who to Call     For medical emergencies, please call 911.  For non-urgent questions about your medical care, please call your primary care provider or clinic, [phone]          Attending Provider     Provider Specialty    Katie Adair MD Emergency Medicine    Waylon, Washington Cruz MD Internal Medicine    Keeley Claros DO Internal Medicine       Primary Care Provider Office Phone # Fax #    Carlos Gomez [phone]      After Care Instructions     Activity       Your activity upon discharge: Bedrest at baseline            Diet        Follow this diet upon discharge:Isosource 1.5; Jejunostomy; Goal Rate: 100; mL/hr; From: 7:00 AM; 7:00 PM; Medication - Tube Feeding Flush Free water 15-30 mL water before and after medication administration and with tube clogged                  Follow-up Appointments     Follow-up and recommended labs and tests        Follow up with primary care provider, Carlos Gomez, within 7 days for hospital follow- up.  No follow up labs or test are needed.                  Additional Services     Home care nursing referral       Resume previous home services    RN skilled nursing visit. RN to assess respiratory and cardiac status and hydration, nutrition and bowel status.  Home health aide to assist with tube feeds, activities of daily life    Your provider has ordered home care nursing services. If you have not been contacted within 2 days of your discharge please call the inpatient department phone number at [phone] .                  Pending Results     Date and Time Order Name Status Description    11/2/2018 0116 Blood culture Preliminary     11/2/2018 0116 Blood culture Preliminary             Statement of Approval     Ordered          11/04/18 1148  I have reviewed and agree with all the recommendations and orders detailed in this document.  EFFECTIVE NOW     Approved and electronically signed by:  Keeley Claros DO             Admission Information     Date & Time Provider Department Dept. Phone    11/2/2018 Keeley Claros DO [place] [address] [facility] [phone]      Your Vitals Were     Blood Pressure Pulse Temperature Respirations Height Weight    108/68 (BP Location: Right arm) 145 98.5  F (36.9  C) (Oral) 18 1.829 m (6') 71.5 kg (157 lb 10.1 oz)    Pulse Oximetry BMI (Body Mass Index)                95% 21.38 kg/m2          MyChart Information     [employer] lets you send messages to your doctor, view your test results, renew your prescriptions, schedule appointments  "and more. To sign up, go to [email or web address] . Click on \"Log in\" on the left side of the screen, which will take you to the Welcome page. Then click on \"Sign up Now\" on the right side of the page.     You will be asked to enter the access code listed below, as well as some personal information. Please follow the directions to create your username and password.     Your access code is: [email or web address]  Expires: 2019 11:58 AM     Your access code will  in 90 days. If you need help or a new code, please call your [facility] clinic or [phone].        Care EveryWhere ID     This is your Care EveryWhere ID. This could be used by other organizations to access your [facility] medical records  [id number]        Equal Access to Services     BRENDA LAO : Angely Rivas, krishna buenrostro, tobias sofia, yaw moreland . So [facility] [phone].    ATENCIÓN: Si habla español, tiene a king disposición servicios gratuitos de asistencia lingüística. Marsha al [phone].    We comply with applicable federal civil rights laws and [place] laws. We do not discriminate on the basis of race, color, national origin, age, disability, sex, sexual orientation, or gender identity.               Review of your medicines      START taking        Dose / Directions    levofloxacin 750 MG tablet   Commonly known as:  LEVAQUIN   Used for:  Pneumonia due to infectious organism, unspecified laterality, unspecified part of lung        Dose:  750 mg   Take 1 tablet (750 mg) by mouth daily   Quantity:  5 tablet   Refills:  0         CONTINUE these medicines which have NOT CHANGED        Dose / Directions    acetaminophen 500 MG tablet   Commonly known as:  TYLENOL        Dose:  1000 mg   1,000 mg by Oral or FT or NG tube route every 6 hours as needed for mild pain   Refills:  0       albuterol (2.5 MG/3ML) 0.083% neb solution        Dose:  1 vial   Take 1 vial by nebulization every 4 " hours as needed for shortness of breath / dyspnea or wheezing   Refills:  0       bacitracin ointment        Apply topically daily as needed for wound care To PEG site.   Refills:  0       BENEFIBER PO        Dose:  2 teaspoonful   Take 2 teaspoonful by mouth daily   Refills:  0       BRIVIACT 10 MG/ML solution   Generic drug:  Brivaracetam        Dose:  100 mg   100 mg by Oral or FT or NG tube route 2 times daily @0900 and 2100   Refills:  0       calcium carbonate 1250 (500 Ca) MG/5ML Susp suspension   Used for:  Malnutrition (H)        Dose:  1250 mg   5 mLs (1,250 mg) by Per J Tube route 3 times daily (with meals)   Quantity:  450 mL   Refills:  0       carBAMazepine 100 MG/5ML suspension   Commonly known as:  TEGretol        Dose:  150 mg   Take 150 mg by mouth every 6 hours At 06:00, 12:00, 18:00 and 24:00 for seizures   Refills:  0       CENTRUM SILVER per tablet        Dose:  1 tablet   Take 1 tablet by mouth daily Crush and feed via j-tube   Refills:  0       * hydrocortisone 5 MG tablet   Commonly known as:  CORTEF        Dose:  10 mg   10 mg by Oral or FT or NG tube route every evening 2 tablets x 5mg=10mg   Refills:  0       * hydrocortisone 5 MG tablet   Commonly known as:  CORTEF        Dose:  15 mg   15 mg by Oral or FT or NG tube route every morning 3 tablets x 5mg=15mg   Refills:  0       levothyroxine 137 MCG tablet   Commonly known as:  SYNTHROID/LEVOTHROID        Dose:  137 mcg   137 mcg by Oral or FT or NG tube route daily   Refills:  0       melatonin 1 MG/ML Liqd liquid        Dose:  6 mg   6 mg by Jejunal Tube route At Bedtime   Refills:  0       potassium & sodium phosphates 280-160-250 MG Packet   Commonly known as:  NEUTRA-PHOS        Dose:  1 packet   Take 1 packet by mouth 3 times daily 0900, 1500, 2100.   Refills:  0       ranitidine 75 MG/5ML syrup   Commonly known as:  ZANTAC        Dose:  150 mg   Take 150 mg by mouth 2 times daily   Refills:  0       saccharomyces boulardii 250 MG  capsule   Commonly known as:  FLORASTOR   Used for:  Altered bowel function        Dose:  250 mg   1 capsule (250 mg) by Oral or Feeding Tube route 2 times daily   Quantity:  60 capsule   Refills:  0       testosterone cypionate 200 MG/ML injection   Commonly known as:  DEPOTESTOSTERONE        Dose:  76 mg   Inject 76 mg into the muscle See Admin Instructions Every 2 weeks. 76 mg or 0.38 mL   Refills:  0       TRANSDERM-SCOP (1.5 MG) 72 hr patch   Indication:  Increased Production of Saliva   Generic drug:  scopolamine        Dose:  1 patch   Place 1 patch onto the skin every 72 hours   Refills:  0       Vitamin D (Cholecalciferol) 1000 units Tabs        Dose:  2 tablet   Take 2 tablets by mouth every morning   Refills:  0       * Notice:  This list has 2 medication(s) that are the same as other medications prescribed for you. Read the directions carefully, and ask your doctor or other care provider to review them with you.         Where to get your medicines      These medications were sent to [facility] [place] - [address]  [address], [facility] [address]     Phone:  [phone]     levofloxacin 750 MG tablet                Protect others around you: Learn how to safely use, store and throw away your medicines at www.disposemymeds.org.        ANTIBIOTIC INSTRUCTION     You've Been Prescribed an Antibiotic - Now What?  Your healthcare team thinks that you or your loved one might have an infection. Some infections can be treated with antibiotics, which are powerful, life-saving drugs. Like all medications, antibiotics have side effects and should only be used when necessary. There are some important things you should know about your antibiotic treatment.      Your healthcare team may run tests before you start taking an antibiotic.    Your team may take samples (e.g., from your blood, urine or other areas) to run tests to look for bacteria. These test can be important to  determine if you need an antibiotic at all and, if you do, which antibiotic will work best.      Within a few days, your healthcare team might change or even stop your antibiotic.    Your team may start you on an antibiotic while they are working to find out what is making you sick.    Your team might change your antibiotic because test results show that a different antibiotic would be better to treat your infection.    In some cases, once your team has more information, they learn that you do not need an antibiotic at all. They may find out that you don't have an infection, or that the antibiotic you're taking won't work against your infection. For example, an infection caused by a virus can't be treated with antibiotics. Staying on an antibiotic when you don't need it is more likely to be harmful than helpful.      You may experience side effects from your antibiotic.    Like all medications, antibiotics have side effects. Some of these can be serious.    Let you healthcare team know if you have any known allergies when you are admitted to the hospital.    One significant side effect of nearly all antibiotics is the risk of severe and sometimes deadly diarrhea caused by Clostridium difficile (C. Difficile). This occurs when a person takes antibiotics because some good germs are destroyed. Antibiotic use allows C. diificile to take over, putting patients at high risk for this serious infection.    As a patient or caregiver, it is important to understand your or your loved one's antibiotic treatment. It is especially important for caregivers to speak up when patients can't speak for themselves. Here are some important questions to ask your healthcare team.    What infection is this antibiotic treating and how do you know I have that infection?    What side effects might occur from this antibiotic?    How long will I need to take this antibiotic?    Is it safe to take this antibiotic with other medications or  supplements (e.g., vitamins) that I am taking?     Are there any special directions I need to know about taking this antibiotic? For example, should I take it with food?    How will I be monitored to know whether my infection is responding to the antibiotic?    What tests may help to make sure the right antibiotic is prescribed for me?      Information provided by:  www.cdc.gov/getsmart  U.S. Department of Health and Human Services  Centers for disease Control and Prevention  National Center for Emerging and Zoonotic Infectious Diseases  Division of Healthcare Quality Promotion             Medication List: This is a list of all your medications and when to take them. Check marks below indicate your daily home schedule. Keep this list as a reference.      Medications           Morning Afternoon Evening Bedtime As Needed    acetaminophen 500 MG tablet   Commonly known as:  TYLENOL   1,000 mg by Oral or FT or NG tube route every 6 hours as needed for mild pain   Last time this was given:  975 mg on 11/2/2018  1:14 AM                                   albuterol (2.5 MG/3ML) 0.083% neb solution   Take 1 vial by nebulization every 4 hours as needed for shortness of breath / dyspnea or wheezing                                   bacitracin ointment   Apply topically daily as needed for wound care To PEG site.                                   BENEFIBER PO   Take 2 teaspoonful by mouth daily                                      BRIVIACT 10 MG/ML solution   100 mg by Oral or FT or NG tube route 2 times daily @0900 and 2100   Last time this was given:  100 mg on 11/4/2018  9:47 AM   Generic drug:  Brivaracetam                                      calcium carbonate 1250 (500 Ca) MG/5ML Susp suspension   5 mLs (1,250 mg) by Per J Tube route 3 times daily (with meals)                                         carBAMazepine 100 MG/5ML suspension   Commonly known as:  TEGretol   Take 150 mg by mouth every 6 hours At 06:00, 12:00,  18:00 and 24:00 for seizures   Last time this was given:  150 mg on 11/4/2018  1:12 PM                                            CENTRUM SILVER per tablet   Take 1 tablet by mouth daily Crush and feed via j-tube                                   * hydrocortisone 5 MG tablet   Commonly known as:  CORTEF   10 mg by Oral or FT or NG tube route every evening 2 tablets x 5mg=10mg   Last time this was given:  15 mg on 11/4/2018  9:49 AM                                   * hydrocortisone 5 MG tablet   Commonly known as:  CORTEF   15 mg by Oral or FT or NG tube route every morning 3 tablets x 5mg=15mg   Last time this was given:  15 mg on 11/4/2018  9:49 AM                                   levofloxacin 750 MG tablet   Commonly known as:  LEVAQUIN   Take 1 tablet (750 mg) by mouth daily                                   levothyroxine 137 MCG tablet   Commonly known as:  SYNTHROID/LEVOTHROID   137 mcg by Oral or FT or NG tube route daily   Last time this was given:  137 mcg on 11/4/2018  9:48 AM                                   melatonin 1 MG/ML Liqd liquid   6 mg by Jejunal Tube route At Bedtime                                   potassium & sodium phosphates 280-160-250 MG Packet   Commonly known as:  NEUTRA-PHOS   Take 1 packet by mouth 3 times daily 0900, 1500, 2100.   Last time this was given:  1 packet on 11/4/2018  9:48 AM                                         ranitidine 75 MG/5ML syrup   Commonly known as:  ZANTAC   Take 150 mg by mouth 2 times daily   Last time this was given:  150 mg on 11/4/2018  9:48 AM                                      saccharomyces boulardii 250 MG capsule   Commonly known as:  FLORASTOR   1 capsule (250 mg) by Oral or Feeding Tube route 2 times daily   Last time this was given:  250 mg on 11/4/2018  9:48 AM                                      testosterone cypionate 200 MG/ML injection   Commonly known as:  DEPOTESTOSTERONE   Inject 76 mg into the muscle See Admin Instructions Every 2  weeks. 76 mg or 0.38 mL                                TRANSDERM-SCOP (1.5 MG) 72 hr patch   Place 1 patch onto the skin every 72 hours   Last time this was given:  1 patch on 11/4/2018 12:56 AM   Generic drug:  scopolamine                                Vitamin D (Cholecalciferol) 1000 units Tabs   Take 2 tablets by mouth every morning   Last time this was given:  2,000 Units on 11/4/2018  9:48 AM                                   * Notice:  This list has 2 medication(s) that are the same as other medications prescribed for you. Read the directions carefully, and ask your doctor or other care provider to review them with you.

## 2018-11-02 NOTE — PLAN OF CARE
Problem: Patient Care Overview  Goal: Plan of Care/Patient Progress Review  Outcome: No Change  Patient up to floor at 0600. Low grade temp, otherwise VSS on 2 LPM NC. Ls diminished in all fields, infrequent nonproductive cough. Pt incontinent, cares provided, nonblanchable redness to buttocks, WOC consulted. Nursing to continue to monitor.

## 2018-11-02 NOTE — ED NOTES
[facility]  ED Nurse Handoff Report    ED Chief complaint: Fever (fever that began last night. pt has had cough for past few days )      ED Diagnosis:   Final diagnoses:   None       Code Status: Hospital MD to address     Allergies:   Allergies   Allergen Reactions     Dilantin [Phenytoin Sodium]      Valproic Acid      Toxicity c bone marrow suspension, elevated ammonia levels        Activity level - Baseline/Home:  Total Care    Activity Level - Current:   Total Care     Needed?: No    Isolation: Yes  Infection: MRSA  VRE  Bariatric?: No    Vital Signs:   Vitals:    11/02/18 0218 11/02/18 0249 11/02/18 0254 11/02/18 0300   BP:  115/83  110/67   Pulse:       Resp: 17 20 18 18   Temp:       TempSrc:       SpO2: 98% 99% 99% 99%   Weight:       Height:           Cardiac Rhythm: ,   Cardiac  Cardiac Rhythm: Sinus tachycardia    Pain level:      Is this patient confused?: No   [place] - Suicide Severity Rating Scale Completed?  No, secondary to nonverbal  If yes, what color did the patient score?  N/A    Patient Report: Initial Complaint: Fever, cough  Focused Assessment: Pt is a male with hx of brain injury whom received 24/7 care in his home. Extensive hx of hospitalization with fever, infections, aspiration pna and sepsis. Pt also has hx of seizures. Pt is wheelchair bound at home with peg. At home, pt febrile at 99 so mother became concerned. Initial vitals had a temp of 99.6 and heart rate in the 140s. Pt has gotten 2 liters LR, iv antibiotics, tylenol and ibuprofen. Pt heart rate now 115 bpm and normotensive. Pt placed on 2 liters NC for an o2 saturation of 91%. Initial lactic 6, wbc 15 and influenza negative. cxr showing R PNA. Mother present at bedside  Tests Performed: Blood work, cxr, influenza, urine   Abnormal Results: wbc 15, lactic 6  Treatments provided: See above and MAR     Family Comments: mother present    OBS brochure/video discussed/provided to patient/family: N/A               Name of person given brochure if not patient: n/a              Relationship to patient: n/a    ED Medications:   Medications   lidocaine 1 % 1 mL (not administered)   lidocaine (LMX4) cream (not administered)   sodium chloride (PF) 0.9% PF flush 3 mL (not administered)   sodium chloride (PF) 0.9% PF flush 3 mL (not administered)   0.9% sodium chloride BOLUS (not administered)   lactated ringers infusion (not administered)   vancomycin (VANCOCIN) 1,750 mg in sodium chloride 0.9 % 500 mL intermittent infusion (1,750 mg Intravenous New Bag 11/2/18 0252)   acetaminophen (TYLENOL) 325 MG tablet (975 mg  Given 11/2/18 0114)   ondansetron (ZOFRAN) injection 4 mg (4 mg Intravenous Given 11/2/18 0136)   0.9% sodium chloride BOLUS (0 mLs Intravenous Stopped 11/2/18 0159)   lactated ringers BOLUS 2,244 mL (2,244 mLs Intravenous New Bag 11/2/18 0157)   piperacillin-tazobactam (ZOSYN) 4.5 g vial to attach to  mL bag (0 g Intravenous Stopped 11/2/18 0244)   hydrocortisone sodium succinate PF (solu-CORTEF) injection 100 mg (100 mg Intravenous Given 11/2/18 0248)   ibuprofen (ADVIL/MOTRIN) suspension 600 mg (600 mg Oral or GJ tube Given 11/2/18 0259)       Drips infusing?:  No    For the majority of the shift this patient was Green.   Interventions performed were meds, repo, updates.    Severe Sepsis OR Septic Shock Diagnosis Present: severe sepsis    To be done/followed up on inpatient unit:  inpt orders    ED NURSE PHONE NUMBER *[phone]

## 2018-11-02 NOTE — PHARMACY-ADMISSION MEDICATION HISTORY
Admission medication history interview status for the 11/2/2018  admission is complete. See EPIC admission navigator for prior to admission medications     Medication history source reliability:Good    Actions taken by pharmacist (provider contacted, etc):  Called pt's mother, Savannah ([phone]), for med list.  Called [facility] to clarify testosterone dose.    Additional medication history information not noted on PTA med list :    --  Pt is on hydrocortisone for panhypopituitarism and carbamazepine/brivaracetam for seizures.    Medication reconciliation/reorder completed by provider prior to medication history? Yes    Time spent in this activity: 30 minutes      Prior to Admission medications    Medication Sig Last Dose Taking? Auth Provider   acetaminophen (TYLENOL) 500 MG tablet 1,000 mg by Oral or FT or NG tube route every 6 hours as needed for mild pain prn Yes Unknown, Entered By History   albuterol (2.5 MG/3ML) 0.083% neb solution Take 1 vial by nebulization every 4 hours as needed for shortness of breath / dyspnea or wheezing prn Yes Unknown, Entered By History   bacitracin ointment Apply topically daily as needed for wound care To PEG site. prn Yes Unknown, Entered By History   Brivaracetam (BRIVIACT) 10 MG/ML solution 100 mg by Oral or FT or NG tube route 2 times daily @0900 and 2100 11/1/2018 Yes Reported, Patient   calcium carbonate 1250 (500 CA) MG/5ML SUSP suspension 5 mLs (1,250 mg) by Per J Tube route 3 times daily (with meals) 11/1/2018 Yes Mariana Venegas MD   carBAMazepine (TEGRETOL) 100 MG/5ML suspension Take 150 mg by mouth every 6 hours At 06:00, 12:00, 18:00 and 24:00 for seizures 11/1/2018 at 24:00 Yes Unknown, Entered By History   hydrocortisone (CORTEF) 5 MG tablet 10 mg by Oral or FT or NG tube route every evening 2 tablets x 5mg=10mg 11/1/2018 Yes Unknown, Entered By History   hydrocortisone (CORTEF) 5 MG tablet 15 mg by Oral or FT or NG tube route every morning 3  tablets x 5mg=15mg 11/1/2018 Yes Unknown, Entered By History   levothyroxine (SYNTHROID/LEVOTHROID) 137 MCG tablet 137 mcg by Oral or FT or NG tube route daily 11/1/2018 Yes Unknown, Entered By History   melatonin (MELATONIN) 1 MG/ML LIQD liquid 6 mg by Jejunal Tube route At Bedtime  11/1/2018 Yes Unknown, Entered By History   Multiple Vitamins-Minerals (CENTRUM SILVER) per tablet Take 1 tablet by mouth daily Crush and feed via j-tube 11/1/2018 Yes Unknown, Entered By History   potassium & sodium phosphates (NEUTRA-PHOS) 280-160-250 MG Packet Take 1 packet by mouth 3 times daily 0900, 1500, 2100.  11/1/2018 Yes Unknown, Entered By History   ranitidine (ZANTAC) 75 MG/5ML syrup Take 150 mg by mouth 2 times daily 11/1/2018 Yes Unknown, Entered By History   saccharomyces boulardii (FLORASTOR) 250 MG capsule 1 capsule (250 mg) by Oral or Feeding Tube route 2 times daily 11/1/2018 Yes Lakia Beebe PA-C   testosterone cypionate (DEPOTESTOTERONE CYPIONATE) 200 MG/ML injection Inject 76 mg into the muscle See Admin Instructions Every 2 weeks.  76 mg or 0.38 mL 10/26/2018 Yes Unknown, Entered By History   Vitamin D, Cholecalciferol, 1000 units TABS Take 2 tablets by mouth every morning 11/1/2018 Yes Unknown, Entered By History   Wheat Dextrin (BENEFIBER PO) Take 2 teaspoonful by mouth daily  11/1/2018 Yes Unknown, Entered By History

## 2018-11-02 NOTE — PROGRESS NOTES
[facility] Nurse Inpatient Skin Assessment     Initial Assessment of:   Coccyx/buttocks; G-tube site        Data:   Patient History:      per MD note(s):  Keyon Farias is a 58 year old male with history of TBI in  and PEG tube dependent and multiple hospitalizations for aspiration pneumonia who presents with low grade fever from home.  He has been found to have a likely aspiration pneumonia with RLL infiltrate, leukocytosis, and elevated lactic acid.        Ricci Assessment and sub scores:   Ricci Score  Av  Min: 13  Max: 13    Positioning: Pillows    Mattress:  Standard , Atmos Air mattress       Moisture Management:  Incontinence Protocol and Diaper    Catheter secured? Not applicable      Current Diet / Nutrition:       None    TF    Labs:   Recent Labs   Lab Test  18   0116   02/15/18   0610   17   0452   17   0228   ALBUMIN  3.6   < >   --    < >   --    --    --    HGB  13.8   < >  12.2*   < >  9.4*   < >   --    INR   --    --    --    --   1.27*   --    --    WBC  15.0*   < >  14.4*   < >  11.7*   < >   --    A1C   --    --   6.6*   --    --    --    --    CRP   --    --    --    --    --    --   87.0*    < > = values in this interval not displayed.         Skin Assessment (location):   Coccyx/inner buttocks  History:  Pt well-known to [facility] service from previous admissions, frequently has redness to coccyx area due to incontinence and limited mobility.      Skin: very reddened throughout inner buttocks and coccyx area, but appears intact and blanchable throughout.  Mildly macerated along gluteal cleft.  Appears borderline rashy but not obviously fungal yet.      Drainage:  n/a    Odor: n/a    Pain:  minimal      G-tube site:  Slight erythema but no open areas and no s/s rash.  Scant creamy drainage to old dressing.            Intervention:     Patient's chart evaluated.      Assessed: skin    Orders  Written    Supplies  reviewed    Discussed plan of care  with Patient and Nurse        Assessment:        Coccyx/buttocks with the start of IAD (incontinence-associated dermatitis).  Tissue remains blanchable and intact, no active pressure injury, but pt at high risk for further breakdown.       No active concerns to g-tube site.         Plan:   Nursing to notify the Provider(s) and re-consult the WO Nurse if skin deteriorate(s).      Skin care plan to coccyx/inner buttocks: BID and prn:  1.  Cleanse with mild cleanser, dry well.  2.  Apply thin layer of Critic-aid barrier paste.   3.  Leave open to air; no dressings.  4.  Check need for pericares/ brief change with each turn.  5.  Rigorous PIP measures.  Turn pt side to side only, every 1-2 hrs.  HOB as low as tolerated.  Float heels at all times.  Consider air mattress if extended stay expected.         WO Nurse will return: weekly and prn

## 2018-11-02 NOTE — PHARMACY-VANCOMYCIN DOSING SERVICE
Pharmacy Vancomycin Initial Note  Date of Service 2018  Patient's  1960  58 year old, male    Indication: Aspiration Pneumonia and Sepsis    Current estimated CrCl = Estimated Creatinine Clearance: 122.9 mL/min (based on Cr of 0.71).    Creatinine for last 3 days  2018:  1:16 AM Creatinine 0.71 mg/dL    Recent Vancomycin Level(s) for last 3 days  No results found for requested labs within last 72 hours.      Vancomycin IV Administrations (past 72 hours)                   vancomycin (VANCOCIN) 1,750 mg in sodium chloride 0.9 % 500 mL intermittent infusion (mg) 1,750 mg New Bag 18 0252                Nephrotoxins and other renal medications (Future)    Start     Dose/Rate Route Frequency Ordered Stop    18 1100  vancomycin (VANCOCIN) 1000 mg in dextrose 5% 200 mL PREMIX      1,000 mg  200 mL/hr over 1 Hours Intravenous EVERY 8 HOURS 18 0622            Contrast Orders - past 72 hours     None                Plan:  1.  Start vancomycin  1000 mg IV q8h.   2.  Goal Trough Level: 15-20 mg/L   3.  Pharmacy will check trough levels as appropriate in 1-3 Days.    4. Serum creatinine levels will be ordered daily for the first week of therapy and at least twice weekly for subsequent weeks.    5. [place] method utilized to dose vancomycin therapy: Method 1    Adarsh Moncada

## 2018-11-02 NOTE — ED NOTES
Mother took all of patient's belongings home including his wheelchair. Patient just has ring to left hand

## 2018-11-02 NOTE — ED PROVIDER NOTES
"  History     Chief Complaint:  Fever       HPI   Keyon Farias is a 58 year old male with history of severe TBI, aspiration pneumonia, and seizures who presents with his mother to the Emergency Department for evaluation of fever. The patient was last seen here on 05/01/2018 for aspiration pneumonia The patient's mother reports that he began to have a fever last night, and 1 hour prior to arrival, he had an axillary temperature of 98F which is elevated for him. She also notes red coloration in his G-tube, and recent gurgling sounds in his throat. When he last saw his Urologist, he noted \"hearing something in his chest\". He had no episodes of emesis at home, though is vomiting in the ED.  Throughout the day, he was at his baseline without complaints.  He received hydrocortisone daily.    Allergies:  Dilantin [Phenytoin Sodium]  Valproic Acid     Medications:    acetaminophen (TYLENOL) 500 MG tablet  albuterol (2.5 MG/3ML) 0.083% neb solution  Brivaracetam (BRIVIACT) 10 MG/ML solution  calcium carbonate 1250 (500 CA) MG/5ML SUSP suspension  carBAMazepine (TEGRETOL) 100 MG/5ML suspension  hydrocortisone (CORTEF) 5 MG tablet  levothyroxine (SYNTHROID/LEVOTHROID) 137 MCG tablet  potassium & sodium phosphates (NEUTRA-PHOS) 280-160-250 MG Packet  saccharomyces boulardii (FLORASTOR) 250 MG capsule  testosterone cypionate (DEPOTESTOTERONE CYPIONATE) 200 MG/ML injection    Past Medical History:    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)   DVT of upper extremity (deep vein thrombosis) (H)   Gastro-oesophageal reflux disease   Panhypopituitarism (H)   Pneumonia   Seizures (H)   Septic shock (H)   Spastic hemiplegia affecting dominant side (H)   Thyroid disease   Tracheostomy care (H)   Traumatic brain injury (H)   Unspecified cerebral artery occlusion with cerebral infarction   UTI (urinary tract infection)   Ventricular fibrillation (H)   Ventricular tachyarrhythmia (H)     Past Surgical History:    LAPAROSCOPIC " APPENDECTOMY  EGD x4   TRACHEOSTOMY x2  VASCULAR SURGERY  HEAD & NECK SURGERY   ENDOSCOPIC ULTRASOUND UPPER GASTROINTESTINAL TRACT (GI)    Family History:    History reviewed. No pertinent family history.      Social History:  Marital Status:  Single   The patient was accompanied to the ED by his mother.  Smoking Status: Former Smoker. Quit 1989  Smokeless Tobacco: Never  Alcohol Use: No     Review of Systems   Constitutional: Positive for fever.   Respiratory: Positive for cough.    Gastrointestinal: Positive for nausea and vomiting.   All other systems reviewed and are negative.      Physical Exam   First Vitals:    .  Patient Vitals for the past 24 hrs:   BP Temp Temp src Pulse Heart Rate Resp SpO2 Height Weight   11/02/18 0300 110/67 - - - 115 18 99 % - -   11/02/18 0254 - - - - 113 18 99 % - -   11/02/18 0249 115/83 - - - 114 20 99 % - -   11/02/18 0218 - - - - 120 17 98 % - -   11/02/18 0216 - - - - 121 19 98 % - -   11/02/18 0215 128/71 - - - - - - - -   11/02/18 0207 - - - - 128 18 98 % - -   11/02/18 0200 130/65 - - - 129 19 97 % - -   11/02/18 0130 - - - - 134 23 96 % - -   11/02/18 0117 - - - 145 - 19 96 % - -   11/02/18 0042 118/63 99.6  F (37.6  C) Oral 115 - 22 99 % 1.829 m (6') 74.8 kg (165 lb)         Physical Exam    Gen:  Vomiting. Nonverbal at baseline    Eye:   Pupils are equal, round, and reactive.     Sclera non-injected.    ENT:   Moist mucus membranes.     Normal tongue.    Oropharynx without lesions.    Cardiac:     Tachycardic rate and regular rhythm.    No murmurs, gallops, or rubs.    Pulmonary:     Course breaths sounds bilaterally     No wheezes, rales, or rhonchi.    Abdomen:     Normal active bowel sounds.     Abdomen is soft and non-distended, without focal tenderness.   GT in RUQ.  Granulomatous tissue surrounding the tube.  No surrounding redness or discharge.  Area is non-tender.    Musculoskeletal:     Normal movement of all extremities without evidence for  deficit.    Extremities:    No edema   No surrounding redness    Skin:   Warm and dry.  No rash.    Neurologic:    Non-focal exam without asymmetric weakness or numbness.    Normal tone     Psychiatric:     Nonverbal baseline      Emergency Department Course     Imaging:  Radiology findings were communicated with the patient who voiced understanding of the findings.      XR Chest 2 Views:   Probable right basilar pneumonia.  Report per radiology      Laboratory:  Laboratory findings were communicated with the patient who voiced understanding of the findings.      UA: Glucose 70, Protein Albumin 10, Amorphous Crystals Few o/w WNL     Influenza: Negative A, Negative B     ISTAT gases lactate gabe: PCO2 56 (H), PO2 15 (L), Bicarbonate 32 (H) o/w WNL  Blood Culture: Pending  Blood Culture: pending   Lactic Acid: 3.9 (H)     CBC:WBC 15 (H) o/w WNL. (HGB 13.8, )    CMP: Glucose 102 (H),  (L), Chloride 93 (L) o/w WNL (Creatinine 0.71)       Interventions:  0114: Tylenol 975 mg PO  0136: NS BOLUS 1000 mL IV  0136: Zofran 4 mg IV   0157: LR BOLUS 2244 mL   0201: Zosyn 4.5 g IV  0249: Solu-CORTEF 100 mg IV  0252: Vancomycin 1750 mg IV  0259: Advil 600 mg PO       Emergency Department Course:  The patient was sent for a XR while in the emergency department, results above.   updated on the imaging and laboratory results.   IV was inserted and blood was drawn for laboratory testing, results above.    Nursing notes and vitals reviewed.  0109: I performed an exam of the patient as documented above.   0240: Patient rechecked and updated.   0400: I spoke with Dr. Connors of the Hospitalist service  regarding patient's presentation, findings, and plan of care.  0500: Mother updated, patient re-evaluated.  He appears improved, smiling.  HR improved, blood pressure stable.  Mother plans to give dose of levothyroxine from home; tegretol ordered.    Findings and plan explained to the Patient and mother who consents to  admission. Discussed the patient with Dr. Wong, who will admit the patient to a [facility] bed for further monitoring, evaluation, and treatment.      Impression & Plan    CMS Diagnoses:   The patient has signs of Severe Sepsis as evidenced by:    1. 2 SIRS criteria, AND  2. Suspected infection, AND   3. Organ dysfunction: Lactic Acid > 1.9    Time severe sepsis diagnosis confirmed = 0133 as this was the time when Lactate resulted, and the level was > 1.9      3 Hour Severe Sepsis Bundle Completion:  1. Initial Lactic Acid Result:   Recent Labs   Lab Test  11/02/18   0338  11/02/18   0133  09/16/18   2230   LACT  3.9*  6.8*  1.5     2. Blood Cultures before Antibiotics: Yes  3. Broad Spectrum Antibiotics Administered: Yes     Anti-infectives (Future)    Start     Dose/Rate Route Frequency Ordered Stop    11/02/18 0204  vancomycin (VANCOCIN) 1,750 mg in sodium chloride 0.9 % 500 mL intermittent infusion      1,750 mg  over 2 Hours Intravenous ONCE 11/02/18 0203          4. 3244 ml of IV fluids.  Ideal body weight: 77.6 kg (171 lb 1.2 oz)    Severe Sepsis reassessment:  1. Repeat Lactic Acid Level: 3.8  2. MAP>65 after initial IVF bolus, will continue to monitor fluid status and vital signs         Medical Decision Making:  Keyon Farias is a 58 year old male with a history of traumatic brain history, panhypopituitarism, aspiration pneumonia  who presents today with low grade temperature and vomiting. On exam, patient was tachycardiac with low grade temperature but did not have a fever. Given his history of severe and rapidly evolving septic shock, we pursed sepsis workup. There patient does have leukocytosis and very elevated lactate, initially at 6.8. He responded well to fluid resuscitation. It does appear that he does have a right basilar pneumonia on chest radiograph. He was started on broad spectrum antibiotics. The source of infection seems likely to be pulmonary. UA is negative for evidence of infection. He does not  have recent diarrhea, nor does he abdominal pain on exam. No change in mental status from baseline. Follow-up swab was negative. The patient was given a stress dose of steroids given his history of panhypopituitarism, and chronic steroid  dependence. Overall, he has responded well to fluid resuscitation, antibiotics and steroids. Vitals have improved.  Lactate still is elevated, but much improved. At this point, I think the patient is stable for [facility] for continued resuscitation, IV antibiotics, and evaluation.     Diagnosis:    ICD-10-CM    1. Severe sepsis (H) A41.9     R65.20    2. Pneumonia due to infectious organism, unspecified laterality, unspecified part of lung J18.9        Disposition:  Admitted to [facility] bed        Melizakiersten Wing  11/2/2018    EMERGENCY DEPARTMENT      Scribe Disclosure:  IMeliza, am serving as a scribe at 1:09 AM on 11/2/2018 to document services personally performed by Katie Adair MD based on my observations and the provider's statements to me.      Katie Adair MD  11/02/18 0765

## 2018-11-02 NOTE — PROGRESS NOTES
Patient admitted this AM. Evaluated after repeat lactate came back at 3.8.  Patient currently infusing lactated ringers, may be affecting lactic acid level. discontinue lactated ringers and order normal saline @125ml/hr x1 bag  Resume tube feedings today, only feed with patient upright  Repeat lactate at noon.  Changed Cefepime to 1g q8h. Reviewed previous culture data and has history of pseudomonas and staph aureus in sputum cx from 2016.    Discussed patient with nursing. Will no longer be IMC as repeat lactate came down to 3.1. NO signs of poor tissue perfusion, blood pressure/heart rate appropriate. No longer requiring supplemental oxygen. Patient appearing closer to his baseline, interactive, good spirits.

## 2018-11-03 LAB
ANION GAP SERPL CALCULATED.3IONS-SCNC: 4 MMOL/L (ref 3–14)
BUN SERPL-MCNC: 9 MG/DL (ref 7–30)
CALCIUM SERPL-MCNC: 8.3 MG/DL (ref 8.5–10.1)
CHLORIDE SERPL-SCNC: 105 MMOL/L (ref 94–109)
CO2 SERPL-SCNC: 29 MMOL/L (ref 20–32)
CREAT SERPL-MCNC: 0.71 MG/DL (ref 0.66–1.25)
ERYTHROCYTE [DISTWIDTH] IN BLOOD BY AUTOMATED COUNT: 13.9 % (ref 10–15)
GFR SERPL CREATININE-BSD FRML MDRD: >90 ML/MIN/1.7M2
GLUCOSE SERPL-MCNC: 115 MG/DL (ref 70–99)
HCT VFR BLD AUTO: 32.5 % (ref 40–53)
HGB BLD-MCNC: 10.9 G/DL (ref 13.3–17.7)
LACTATE BLD-SCNC: 1.6 MMOL/L (ref 0.7–2)
MCH RBC QN AUTO: 29.7 PG (ref 26.5–33)
MCHC RBC AUTO-ENTMCNC: 33.5 G/DL (ref 31.5–36.5)
MCV RBC AUTO: 89 FL (ref 78–100)
PLATELET # BLD AUTO: 169 10E9/L (ref 150–450)
POTASSIUM SERPL-SCNC: 3.7 MMOL/L (ref 3.4–5.3)
RBC # BLD AUTO: 3.67 10E12/L (ref 4.4–5.9)
SODIUM SERPL-SCNC: 138 MMOL/L (ref 133–144)
VANCOMYCIN SERPL-MCNC: 26.4 MG/L
WBC # BLD AUTO: 10.8 10E9/L (ref 4–11)

## 2018-11-03 PROCEDURE — A9270 NON-COVERED ITEM OR SERVICE: HCPCS | Mod: GY | Performed by: HOSPITALIST

## 2018-11-03 PROCEDURE — 12000000 ZZH R&B MED SURG/OB

## 2018-11-03 PROCEDURE — 25000132 ZZH RX MED GY IP 250 OP 250 PS 637: Mod: GY | Performed by: HOSPITALIST

## 2018-11-03 PROCEDURE — 36415 COLL VENOUS BLD VENIPUNCTURE: CPT | Performed by: HOSPITALIST

## 2018-11-03 PROCEDURE — 25000128 H RX IP 250 OP 636: Performed by: HOSPITALIST

## 2018-11-03 PROCEDURE — 80202 ASSAY OF VANCOMYCIN: CPT | Performed by: HOSPITALIST

## 2018-11-03 PROCEDURE — 80048 BASIC METABOLIC PNL TOTAL CA: CPT | Performed by: HOSPITALIST

## 2018-11-03 PROCEDURE — 99232 SBSQ HOSP IP/OBS MODERATE 35: CPT | Performed by: HOSPITALIST

## 2018-11-03 PROCEDURE — 83605 ASSAY OF LACTIC ACID: CPT | Performed by: HOSPITALIST

## 2018-11-03 PROCEDURE — 85027 COMPLETE CBC AUTOMATED: CPT | Performed by: HOSPITALIST

## 2018-11-03 PROCEDURE — 27210429 ZZH NUTRITION PRODUCT INTERMEDIATE LITER

## 2018-11-03 RX ORDER — SCOLOPAMINE TRANSDERMAL SYSTEM 1 MG/1
1 PATCH, EXTENDED RELEASE TRANSDERMAL
Status: DISCONTINUED | OUTPATIENT
Start: 2018-11-03 | End: 2018-11-04 | Stop reason: HOSPADM

## 2018-11-03 RX ORDER — SCOLOPAMINE TRANSDERMAL SYSTEM 1 MG/1
1 PATCH, EXTENDED RELEASE TRANSDERMAL
COMMUNITY
End: 2019-01-31

## 2018-11-03 RX ORDER — VANCOMYCIN HYDROCHLORIDE 1 G/200ML
1000 INJECTION, SOLUTION INTRAVENOUS EVERY 12 HOURS
Status: DISCONTINUED | OUTPATIENT
Start: 2018-11-03 | End: 2018-11-03

## 2018-11-03 RX ADMIN — ENOXAPARIN SODIUM 40 MG: 40 INJECTION SUBCUTANEOUS at 09:10

## 2018-11-03 RX ADMIN — LEVOTHYROXINE SODIUM 137 MCG: 137 TABLET ORAL at 09:11

## 2018-11-03 RX ADMIN — BRIVARACETAM 100 MG: 10 SOLUTION ORAL at 21:39

## 2018-11-03 RX ADMIN — RANITIDINE HYDROCHLORIDE 150 MG: 150 SOLUTION ORAL at 21:14

## 2018-11-03 RX ADMIN — CEFEPIME HYDROCHLORIDE 1 G: 1 INJECTION, POWDER, FOR SOLUTION INTRAMUSCULAR; INTRAVENOUS at 16:41

## 2018-11-03 RX ADMIN — BRIVARACETAM 100 MG: 10 SOLUTION ORAL at 09:09

## 2018-11-03 RX ADMIN — POTASSIUM & SODIUM PHOSPHATES POWDER PACK 280-160-250 MG 1 PACKET: 280-160-250 PACK at 16:40

## 2018-11-03 RX ADMIN — CARBAMAZEPINE 150 MG: 100 SUSPENSION ORAL at 13:22

## 2018-11-03 RX ADMIN — CEFEPIME HYDROCHLORIDE 1 G: 1 INJECTION, POWDER, FOR SOLUTION INTRAMUSCULAR; INTRAVENOUS at 09:09

## 2018-11-03 RX ADMIN — Medication 250 MG: at 21:14

## 2018-11-03 RX ADMIN — CARBAMAZEPINE 150 MG: 100 SUSPENSION ORAL at 00:32

## 2018-11-03 RX ADMIN — Medication 1 PACKET: at 13:22

## 2018-11-03 RX ADMIN — CARBAMAZEPINE 150 MG: 100 SUSPENSION ORAL at 06:27

## 2018-11-03 RX ADMIN — VANCOMYCIN HYDROCHLORIDE 1000 MG: 1 INJECTION, SOLUTION INTRAVENOUS at 02:52

## 2018-11-03 RX ADMIN — VITAMIN D, TAB 1000IU (100/BT) 2000 UNITS: 25 TAB at 09:10

## 2018-11-03 RX ADMIN — POTASSIUM & SODIUM PHOSPHATES POWDER PACK 280-160-250 MG 1 PACKET: 280-160-250 PACK at 09:10

## 2018-11-03 RX ADMIN — DOCUSATE SODIUM 100 MG: 50 LIQUID ORAL at 09:10

## 2018-11-03 RX ADMIN — Medication 250 MG: at 09:11

## 2018-11-03 RX ADMIN — DOCUSATE SODIUM 100 MG: 50 LIQUID ORAL at 21:14

## 2018-11-03 RX ADMIN — HYDROCORTISONE 15 MG: 5 TABLET ORAL at 09:11

## 2018-11-03 RX ADMIN — CARBAMAZEPINE 150 MG: 100 SUSPENSION ORAL at 17:13

## 2018-11-03 RX ADMIN — CEFEPIME HYDROCHLORIDE 1 G: 1 INJECTION, POWDER, FOR SOLUTION INTRAMUSCULAR; INTRAVENOUS at 01:40

## 2018-11-03 RX ADMIN — HYDROCORTISONE 10 MG: 10 TABLET ORAL at 21:14

## 2018-11-03 RX ADMIN — POTASSIUM & SODIUM PHOSPHATES POWDER PACK 280-160-250 MG 1 PACKET: 280-160-250 PACK at 21:14

## 2018-11-03 RX ADMIN — RANITIDINE HYDROCHLORIDE 150 MG: 150 SOLUTION ORAL at 09:10

## 2018-11-03 ASSESSMENT — ACTIVITIES OF DAILY LIVING (ADL)
ADLS_ACUITY_SCORE: 44
BATHING: 4-->COMPLETELY DEPENDENT
ADLS_ACUITY_SCORE: 44
SWALLOWING: 2-->DIFFICULTY SWALLOWING FOODS
FALL_HISTORY_WITHIN_LAST_SIX_MONTHS: YES
TOILETING: 3-->ASSISTIVE EQUIPMENT AND PERSON
ADLS_ACUITY_SCORE: 44
WHICH_OF_THE_ABOVE_FUNCTIONAL_RISKS_HAD_A_RECENT_ONSET_OR_CHANGE?: TOILETING
TRANSFERRING: 4-->COMPLETELY DEPENDENT
ADLS_ACUITY_SCORE: 44
ADLS_ACUITY_SCORE: 41
ADLS_ACUITY_SCORE: 44
NUMBER_OF_TIMES_PATIENT_HAS_FALLEN_WITHIN_LAST_SIX_MONTHS: 1
COGNITION: 2 - DIFFICULTY WITH ORGANIZING THOUGHTS
DRESS: 4-->COMPLETELY DEPENDENT
AMBULATION: 4-->COMPLETELY DEPENDENT
RETIRED_COMMUNICATION: 3-->UNABLE TO SPEAK (NOT RELATED TO LANGUAGE BARRIER)
RETIRED_EATING: 4-->COMPLETELY DEPENDENT

## 2018-11-03 NOTE — PLAN OF CARE
Problem: Patient Care Overview  Goal: Plan of Care/Patient Progress Review  Outcome: Improving  Patient is nonverbal, does follow some commands & can sometimes nod yes or no. Total care, turn & reposition every two hours, Ax2, up with lift into chair today. Incontinent of B&B. VSS on room air. TF running. PIVs SL. On IV antibiotics. Continue to monitor.
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
## 2018-11-03 NOTE — PHARMACY-VANCOMYCIN DOSING SERVICE
Pharmacy Vancomycin Note  Date of Service November 3, 2018  Patient's  1960   58 year old, male    Indication: Aspiration Pneumonia and Sepsis  Goal Trough Level: 15-20 mg/L  Day of Therapy: 2  Current Vancomycin regimen:  1000 mg IV q8h    Current estimated CrCl = Estimated Creatinine Clearance: 124.7 mL/min (based on Cr of 0.71).    Creatinine for last 3 days  2018:  1:16 AM Creatinine 0.71 mg/dL;  8:49 AM Creatinine 0.74 mg/dL  11/3/2018:  9:43 AM Creatinine 0.71 mg/dL    Recent Vancomycin Levels (past 3 days)  11/3/2018:  9:43 AM Vancomycin Level 26.4 mg/L    Vancomycin IV Administrations (past 72 hours)                   vancomycin (VANCOCIN) 1000 mg in dextrose 5% 200 mL PREMIX (mg) 1,000 mg New Bag 18 0252     1,000 mg New Bag 18 1915     1,000 mg New Bag  1144    vancomycin (VANCOCIN) 1,750 mg in sodium chloride 0.9 % 500 mL intermittent infusion (mg) 1,750 mg New Bag 18 0252                Nephrotoxins and other renal medications (Future)    Start     Dose/Rate Route Frequency Ordered Stop    18 1800  vancomycin (VANCOCIN) 1000 mg in dextrose 5% 200 mL PREMIX      1,000 mg  200 mL/hr over 1 Hours Intravenous EVERY 12 HOURS 18 1410               Contrast Orders - past 72 hours     None          Interpretation of levels and current regimen:  Trough level is  Supratherapeutic    Has serum creatinine changed > 50% in last 72 hours: No    Urine output:  unable to determine    Renal Function: Stable    Plan:  1.  Decrease Dose to 1000mg [id number]. Start new interval at 15 hours post last q8hour dose to ensure patient has cleared vanco and level headed toward therapeutic range. This allows for more convenient dosing time as well (0600 and 1800).  2.  Pharmacy will check trough levels as appropriate in 1-3 Days.    3. Serum creatinine levels will be ordered daily for the first week of therapy and at least twice weekly for subsequent weeks.      Juana Real        .

## 2018-11-03 NOTE — PLAN OF CARE
Problem: Patient Care Overview  Goal: Plan of Care/Patient Progress Review  Outcome: Improving  Patient is nonverbal, does follow some commands & can sometimes nod yes or no. Total care, turn & reposition every two hours. VSS on room air. Tele: NSR. TF off, to be on at 0700 this morning. On IV antibiotics.

## 2018-11-03 NOTE — PROGRESS NOTES
Admission    Patient arrives to room 626 via cart from 33.  Care plan note: pt is mostly nonverbal, able to say yes/no at times and uses facial expressions to communicate. Total care. Incontinent B&B. Redness on buttocks tx with barrier paste and open to air. Dry cough. Family to bring prescription for scopolamine tomorrow as PTA lists do not include this at this time. Tube feed running at 60. And NS running at 125. NPO. Declined mouth swabs this adarsh. Mother is HCA. She was updated on care today.     Inpatient nursing criteria listed below were met:    PCD's Documented: NA  Skin issues/needs documented :Yes  Isolation education started/completed Yes  Patient allergies verified with patient: Yes  Verified completion of [place] Risk Assessment Tool:  Yes  Verified completion of Guardianship screening tool: Yes  Fall Prevention: Care plan updated, Education given and documented NA  Care Plan initiated: Yes  Home medications documented in belongings flowsheet: NA  Patient belongings documented in belongings flowsheet: Yes  Reminder note (belongings/ medications) placed in discharge instructions:NA  Admission profile/ required documentation complete: No

## 2018-11-03 NOTE — PROVIDER NOTIFICATION
MD Notification    Notified Person: MD    Notified Person Name: Dr. Claros    Notification Date/Time: 10:51 AM November 3, 2018    Notification Interaction: paged    Purpose of Notification: Lab call critical level, Vanco: 26.4    Orders Received:     Comments: Pharmacist notified as well.

## 2018-11-04 VITALS
OXYGEN SATURATION: 95 % | WEIGHT: 157.63 LBS | BODY MASS INDEX: 21.35 KG/M2 | HEART RATE: 145 BPM | TEMPERATURE: 98.5 F | RESPIRATION RATE: 18 BRPM | HEIGHT: 72 IN | DIASTOLIC BLOOD PRESSURE: 68 MMHG | SYSTOLIC BLOOD PRESSURE: 108 MMHG

## 2018-11-04 LAB
ANION GAP SERPL CALCULATED.3IONS-SCNC: 6 MMOL/L (ref 3–14)
BUN SERPL-MCNC: 13 MG/DL (ref 7–30)
CALCIUM SERPL-MCNC: 8.7 MG/DL (ref 8.5–10.1)
CHLORIDE SERPL-SCNC: 101 MMOL/L (ref 94–109)
CO2 SERPL-SCNC: 28 MMOL/L (ref 20–32)
CREAT SERPL-MCNC: 0.72 MG/DL (ref 0.66–1.25)
ERYTHROCYTE [DISTWIDTH] IN BLOOD BY AUTOMATED COUNT: 13.5 % (ref 10–15)
GFR SERPL CREATININE-BSD FRML MDRD: >90 ML/MIN/1.7M2
GLUCOSE SERPL-MCNC: 76 MG/DL (ref 70–99)
HCT VFR BLD AUTO: 34.5 % (ref 40–53)
HGB BLD-MCNC: 11.6 G/DL (ref 13.3–17.7)
MCH RBC QN AUTO: 29.4 PG (ref 26.5–33)
MCHC RBC AUTO-ENTMCNC: 33.6 G/DL (ref 31.5–36.5)
MCV RBC AUTO: 88 FL (ref 78–100)
PLATELET # BLD AUTO: 184 10E9/L (ref 150–450)
POTASSIUM SERPL-SCNC: 3.8 MMOL/L (ref 3.4–5.3)
RBC # BLD AUTO: 3.94 10E12/L (ref 4.4–5.9)
SODIUM SERPL-SCNC: 135 MMOL/L (ref 133–144)
WBC # BLD AUTO: 8.1 10E9/L (ref 4–11)

## 2018-11-04 PROCEDURE — 36415 COLL VENOUS BLD VENIPUNCTURE: CPT | Performed by: HOSPITALIST

## 2018-11-04 PROCEDURE — 85027 COMPLETE CBC AUTOMATED: CPT | Performed by: HOSPITALIST

## 2018-11-04 PROCEDURE — 25000125 ZZHC RX 250: Performed by: INTERNAL MEDICINE

## 2018-11-04 PROCEDURE — 25000132 ZZH RX MED GY IP 250 OP 250 PS 637: Mod: GY | Performed by: HOSPITALIST

## 2018-11-04 PROCEDURE — 99239 HOSP IP/OBS DSCHRG MGMT >30: CPT | Performed by: HOSPITALIST

## 2018-11-04 PROCEDURE — 25000128 H RX IP 250 OP 636: Performed by: HOSPITALIST

## 2018-11-04 PROCEDURE — 80048 BASIC METABOLIC PNL TOTAL CA: CPT | Performed by: HOSPITALIST

## 2018-11-04 PROCEDURE — A9270 NON-COVERED ITEM OR SERVICE: HCPCS | Mod: GY | Performed by: HOSPITALIST

## 2018-11-04 RX ORDER — LEVOFLOXACIN 750 MG/1
750 TABLET, FILM COATED ORAL DAILY
Qty: 5 TABLET | Refills: 0
Start: 2018-11-04 | End: 2018-11-04

## 2018-11-04 RX ORDER — LEVOFLOXACIN 750 MG/1
750 TABLET, FILM COATED ORAL DAILY
Qty: 5 TABLET | Refills: 0 | Status: ON HOLD | OUTPATIENT
Start: 2018-11-04 | End: 2018-11-22

## 2018-11-04 RX ADMIN — DOCUSATE SODIUM 100 MG: 50 LIQUID ORAL at 09:48

## 2018-11-04 RX ADMIN — POTASSIUM & SODIUM PHOSPHATES POWDER PACK 280-160-250 MG 1 PACKET: 280-160-250 PACK at 09:48

## 2018-11-04 RX ADMIN — Medication 250 MG: at 09:48

## 2018-11-04 RX ADMIN — VITAMIN D, TAB 1000IU (100/BT) 2000 UNITS: 25 TAB at 09:48

## 2018-11-04 RX ADMIN — HYDROCORTISONE 15 MG: 5 TABLET ORAL at 09:49

## 2018-11-04 RX ADMIN — LEVOTHYROXINE SODIUM 137 MCG: 137 TABLET ORAL at 09:48

## 2018-11-04 RX ADMIN — CARBAMAZEPINE 150 MG: 100 SUSPENSION ORAL at 13:12

## 2018-11-04 RX ADMIN — RANITIDINE HYDROCHLORIDE 150 MG: 150 SOLUTION ORAL at 09:48

## 2018-11-04 RX ADMIN — CEFEPIME HYDROCHLORIDE 1 G: 1 INJECTION, POWDER, FOR SOLUTION INTRAMUSCULAR; INTRAVENOUS at 01:17

## 2018-11-04 RX ADMIN — CEFEPIME HYDROCHLORIDE 1 G: 1 INJECTION, POWDER, FOR SOLUTION INTRAMUSCULAR; INTRAVENOUS at 09:47

## 2018-11-04 RX ADMIN — Medication 1 PACKET: at 09:48

## 2018-11-04 RX ADMIN — CARBAMAZEPINE 150 MG: 100 SUSPENSION ORAL at 06:27

## 2018-11-04 RX ADMIN — SCOPALAMINE 1 PATCH: 1 PATCH, EXTENDED RELEASE TRANSDERMAL at 00:56

## 2018-11-04 RX ADMIN — CARBAMAZEPINE 150 MG: 100 SUSPENSION ORAL at 00:58

## 2018-11-04 RX ADMIN — ENOXAPARIN SODIUM 40 MG: 40 INJECTION SUBCUTANEOUS at 09:48

## 2018-11-04 RX ADMIN — BRIVARACETAM 100 MG: 10 SOLUTION ORAL at 09:47

## 2018-11-04 ASSESSMENT — ACTIVITIES OF DAILY LIVING (ADL)
ADLS_ACUITY_SCORE: 41

## 2018-11-04 NOTE — PLAN OF CARE
Problem: Patient Care Overview  Goal: Plan of Care/Patient Progress Review  Outcome: Improving   pt is mostly nonverbal, able to say yes/no at times and uses facial expressions to communicate. Total care. Incontinent B&B. Redness on buttocks tx with barrier paste and open to air. Dry cough, difficulty Tube feed running at 100 to meet daily quota of 1200. NPO. Declined mouth swabs. Mother is HCA. She was updated on care today. scopolamine patches ordered, patch to left neck behind ear. IV abx ordered. LA down to 1.6. Possible discharge tomorrow. Continue to monitor.

## 2018-11-04 NOTE — PROGRESS NOTES
Orders faxed to [facility] (fax [phone]).    Contacted [facility] ([phone]), agency that provides his PCA services.  They were aware patient was discharging to home and did not need anything.

## 2018-11-04 NOTE — PROGRESS NOTES
CHE  D) Patient is discharging home today. Mother/guardian. Savannah requests wheelchair transport home using patient's wheelchair, which she will be bringing to the hospital.  I) [facility] wheelchair ride to patient's home at 1515 today. Savannah will have home care staff available to meet patient when he arrives at home. There is a ramp to the house.  RN Care Coordinator will fax orders to patient's home care agencies.  P) SW will continue to be available if needed.
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
## 2018-11-04 NOTE — PLAN OF CARE
Problem: Patient Care Overview  Goal: Plan of Care/Patient Progress Review  Outcome: Improving  Patient is nonverbal, has been somnolent, able to follow commands.Total care, turn & repostion every two hours. VSS on room air. TF off once patient reached 1200mL, to be back on at 0700 this morning. Continues on IV antibiotics. Discharge pending.

## 2018-11-04 NOTE — PLAN OF CARE
Problem: Patient Care Overview  Goal: Plan of Care/Patient Progress Review  Outcome: Adequate for Discharge Date Met: 11/04/18  Discharge    Patient discharged to home via HE in his own wheelchair @ 1515  Care plan note: Patient is nonverbal, does follow some commands & can sometimes nod yes or no. Total care, turn & reposition every two hours, Ax2, up with lift into chair today. Incontinent of B&B. VSS on room air. TF running. PIVs SL. On IV antibiotics. Continue to monitor.       Listed belongings gathered and returned to patient. Yes  Care Plan and Patient education resolved: Yes  Prescriptions if needed, hard copies sent with patient  NA  Home and hospital acquired medications returned to patient: NA  Medication Bin checked and emptied on discharge Yes  Follow up appointment made for patient: No, pt. Mother/caregiver will make appointment.

## 2018-11-22 ENCOUNTER — APPOINTMENT (OUTPATIENT)
Dept: GENERAL RADIOLOGY | Facility: CLINIC | Age: 58
DRG: 871 | End: 2018-11-22
Attending: EMERGENCY MEDICINE
Payer: MEDICARE

## 2018-11-22 ENCOUNTER — HOSPITAL ENCOUNTER (INPATIENT)
Facility: CLINIC | Age: 58
LOS: 3 days | Discharge: HOME | DRG: 871 | End: 2018-11-25
Attending: EMERGENCY MEDICINE | Admitting: INTERNAL MEDICINE
Payer: MEDICARE

## 2018-11-22 DIAGNOSIS — J69.0 ASPIRATION PNEUMONIA OF RIGHT LUNG, UNSPECIFIED ASPIRATION PNEUMONIA TYPE, UNSPECIFIED PART OF LUNG (H): ICD-10-CM

## 2018-11-22 DIAGNOSIS — B37.0 THRUSH: Primary | ICD-10-CM

## 2018-11-22 DIAGNOSIS — A41.9 SEPSIS, DUE TO UNSPECIFIED ORGANISM: ICD-10-CM

## 2018-11-22 DIAGNOSIS — Z76.89 HEALTH CARE HOME: ICD-10-CM

## 2018-11-22 DIAGNOSIS — J18.9 PNEUMONIA OF RIGHT LUNG DUE TO INFECTIOUS ORGANISM, UNSPECIFIED PART OF LUNG: ICD-10-CM

## 2018-11-22 LAB
ALBUMIN SERPL-MCNC: 2.6 G/DL (ref 3.4–5)
ALBUMIN SERPL-MCNC: 3.2 G/DL (ref 3.4–5)
ALBUMIN UR-MCNC: 10 MG/DL
ALP SERPL-CCNC: 72 U/L (ref 40–150)
ALP SERPL-CCNC: 93 U/L (ref 40–150)
ALT SERPL W P-5'-P-CCNC: 19 U/L (ref 0–70)
ALT SERPL W P-5'-P-CCNC: 23 U/L (ref 0–70)
ANION GAP SERPL CALCULATED.3IONS-SCNC: 8 MMOL/L (ref 3–14)
ANION GAP SERPL CALCULATED.3IONS-SCNC: 8 MMOL/L (ref 3–14)
APPEARANCE UR: CLEAR
AST SERPL W P-5'-P-CCNC: 15 U/L (ref 0–45)
AST SERPL W P-5'-P-CCNC: 19 U/L (ref 0–45)
BASOPHILS # BLD AUTO: 0.1 10E9/L (ref 0–0.2)
BASOPHILS NFR BLD AUTO: 0.4 %
BILIRUB SERPL-MCNC: 0.5 MG/DL (ref 0.2–1.3)
BILIRUB SERPL-MCNC: 0.5 MG/DL (ref 0.2–1.3)
BILIRUB UR QL STRIP: NEGATIVE
BUN SERPL-MCNC: 12 MG/DL (ref 7–30)
BUN SERPL-MCNC: 9 MG/DL (ref 7–30)
CALCIUM SERPL-MCNC: 8.1 MG/DL (ref 8.5–10.1)
CALCIUM SERPL-MCNC: 8.5 MG/DL (ref 8.5–10.1)
CHLORIDE SERPL-SCNC: 110 MMOL/L (ref 94–109)
CHLORIDE SERPL-SCNC: 94 MMOL/L (ref 94–109)
CO2 BLDCOV-SCNC: 23 MMOL/L (ref 21–28)
CO2 BLDCOV-SCNC: 25 MMOL/L (ref 21–28)
CO2 SERPL-SCNC: 23 MMOL/L (ref 20–32)
CO2 SERPL-SCNC: 26 MMOL/L (ref 20–32)
COLOR UR AUTO: YELLOW
CREAT SERPL-MCNC: 0.71 MG/DL (ref 0.66–1.25)
CREAT SERPL-MCNC: 0.72 MG/DL (ref 0.66–1.25)
CREAT SERPL-MCNC: 0.72 MG/DL (ref 0.66–1.25)
DIFFERENTIAL METHOD BLD: ABNORMAL
EOSINOPHIL # BLD AUTO: 0.5 10E9/L (ref 0–0.7)
EOSINOPHIL NFR BLD AUTO: 3.9 %
ERYTHROCYTE [DISTWIDTH] IN BLOOD BY AUTOMATED COUNT: 13.4 % (ref 10–15)
FLUAV+FLUBV AG SPEC QL: NEGATIVE
FLUAV+FLUBV AG SPEC QL: NEGATIVE
GFR SERPL CREATININE-BSD FRML MDRD: >90 ML/MIN/1.7M2
GLUCOSE BLDC GLUCOMTR-MCNC: 112 MG/DL (ref 70–99)
GLUCOSE BLDC GLUCOMTR-MCNC: 192 MG/DL (ref 70–99)
GLUCOSE BLDC GLUCOMTR-MCNC: 200 MG/DL (ref 70–99)
GLUCOSE SERPL-MCNC: 102 MG/DL (ref 70–99)
GLUCOSE SERPL-MCNC: 116 MG/DL (ref 70–99)
GLUCOSE UR STRIP-MCNC: NEGATIVE MG/DL
HBA1C MFR BLD: 6.3 % (ref 0–5.6)
HCT VFR BLD AUTO: 36.3 % (ref 40–53)
HGB BLD-MCNC: 12.4 G/DL (ref 13.3–17.7)
HGB UR QL STRIP: NEGATIVE
IMM GRANULOCYTES # BLD: 0 10E9/L (ref 0–0.4)
IMM GRANULOCYTES NFR BLD: 0.3 %
INTERPRETATION ECG - MUSE: NORMAL
KETONES UR STRIP-MCNC: NEGATIVE MG/DL
LACTATE BLD-SCNC: 2.6 MMOL/L (ref 0.7–2.1)
LACTATE BLD-SCNC: 2.7 MMOL/L (ref 0.7–2)
LACTATE BLD-SCNC: 3 MMOL/L (ref 0.7–2)
LACTATE BLD-SCNC: 3.2 MMOL/L (ref 0.7–2)
LACTATE BLD-SCNC: 3.6 MMOL/L (ref 0.7–2)
LACTATE BLD-SCNC: 3.6 MMOL/L (ref 0.7–2.1)
LACTATE SERPL-SCNC: 2.5 MMOL/L (ref 0.4–2)
LACTATE SERPL-SCNC: 9 MMOL/L (ref 0.4–2)
LEUKOCYTE ESTERASE UR QL STRIP: NEGATIVE
LYMPHOCYTES # BLD AUTO: 2.4 10E9/L (ref 0.8–5.3)
LYMPHOCYTES NFR BLD AUTO: 20.4 %
MAGNESIUM SERPL-MCNC: 1.9 MG/DL (ref 1.6–2.3)
MCH RBC QN AUTO: 29.1 PG (ref 26.5–33)
MCHC RBC AUTO-ENTMCNC: 34.2 G/DL (ref 31.5–36.5)
MCV RBC AUTO: 85 FL (ref 78–100)
MONOCYTES # BLD AUTO: 0.8 10E9/L (ref 0–1.3)
MONOCYTES NFR BLD AUTO: 7 %
NEUTROPHILS # BLD AUTO: 8 10E9/L (ref 1.6–8.3)
NEUTROPHILS NFR BLD AUTO: 68 %
NITRATE UR QL: NEGATIVE
NRBC # BLD AUTO: 0 10*3/UL
NRBC BLD AUTO-RTO: 0 /100
PCO2 BLDV: 34 MM HG (ref 40–50)
PCO2 BLDV: 37 MM HG (ref 40–50)
PH BLDV: 7.43 PH (ref 7.32–7.43)
PH BLDV: 7.45 PH (ref 7.32–7.43)
PH UR STRIP: 8 PH (ref 5–7)
PLATELET # BLD AUTO: 158 10E9/L (ref 150–450)
PLATELET # BLD AUTO: 164 10E9/L (ref 150–450)
PO2 BLDV: 38 MM HG (ref 25–47)
PO2 BLDV: 65 MM HG (ref 25–47)
POTASSIUM SERPL-SCNC: 4.3 MMOL/L (ref 3.4–5.3)
POTASSIUM SERPL-SCNC: 4.6 MMOL/L (ref 3.4–5.3)
PROT SERPL-MCNC: 6.7 G/DL (ref 6.8–8.8)
PROT SERPL-MCNC: 7.7 G/DL (ref 6.8–8.8)
RBC # BLD AUTO: 4.26 10E12/L (ref 4.4–5.9)
RBC #/AREA URNS AUTO: 1 /HPF (ref 0–2)
SAO2 % BLDV FROM PO2: 73 %
SAO2 % BLDV FROM PO2: 94 %
SODIUM SERPL-SCNC: 128 MMOL/L (ref 133–144)
SODIUM SERPL-SCNC: 141 MMOL/L (ref 133–144)
SODIUM SERPL-SCNC: 142 MMOL/L (ref 133–144)
SOURCE: ABNORMAL
SP GR UR STRIP: 1.01 (ref 1–1.03)
SPECIMEN SOURCE: NORMAL
SQUAMOUS #/AREA URNS AUTO: <1 /HPF (ref 0–1)
UROBILINOGEN UR STRIP-MCNC: 2 MG/DL (ref 0–2)
WBC # BLD AUTO: 11.7 10E9/L (ref 4–11)
WBC #/AREA URNS AUTO: 1 /HPF (ref 0–5)

## 2018-11-22 PROCEDURE — 12000000 ZZH R&B MED SURG/OB

## 2018-11-22 PROCEDURE — 25000131 ZZH RX MED GY IP 250 OP 636 PS 637: Performed by: INTERNAL MEDICINE

## 2018-11-22 PROCEDURE — 25000128 H RX IP 250 OP 636: Performed by: EMERGENCY MEDICINE

## 2018-11-22 PROCEDURE — 96375 TX/PRO/DX INJ NEW DRUG ADDON: CPT

## 2018-11-22 PROCEDURE — 83036 HEMOGLOBIN GLYCOSYLATED A1C: CPT | Performed by: INTERNAL MEDICINE

## 2018-11-22 PROCEDURE — 00000146 ZZHCL STATISTIC GLUCOSE BY METER IP

## 2018-11-22 PROCEDURE — 80053 COMPREHEN METABOLIC PANEL: CPT | Performed by: INTERNAL MEDICINE

## 2018-11-22 PROCEDURE — 96365 THER/PROPH/DIAG IV INF INIT: CPT

## 2018-11-22 PROCEDURE — 82803 BLOOD GASES ANY COMBINATION: CPT

## 2018-11-22 PROCEDURE — A9270 NON-COVERED ITEM OR SERVICE: HCPCS | Performed by: INTERNAL MEDICINE

## 2018-11-22 PROCEDURE — 25000128 H RX IP 250 OP 636: Performed by: INTERNAL MEDICINE

## 2018-11-22 PROCEDURE — 36415 COLL VENOUS BLD VENIPUNCTURE: CPT | Performed by: INTERNAL MEDICINE

## 2018-11-22 PROCEDURE — 25000132 ZZH RX MED GY IP 250 OP 250 PS 637: Performed by: INTERNAL MEDICINE

## 2018-11-22 PROCEDURE — 87804 INFLUENZA ASSAY W/OPTIC: CPT | Performed by: EMERGENCY MEDICINE

## 2018-11-22 PROCEDURE — 96361 HYDRATE IV INFUSION ADD-ON: CPT

## 2018-11-22 PROCEDURE — 99285 EMERGENCY DEPT VISIT HI MDM: CPT | Mod: 25

## 2018-11-22 PROCEDURE — 99223 1ST HOSP IP/OBS HIGH 75: CPT | Mod: AI | Performed by: INTERNAL MEDICINE

## 2018-11-22 PROCEDURE — 82565 ASSAY OF CREATININE: CPT | Performed by: INTERNAL MEDICINE

## 2018-11-22 PROCEDURE — 84295 ASSAY OF SERUM SODIUM: CPT | Performed by: INTERNAL MEDICINE

## 2018-11-22 PROCEDURE — A9270 NON-COVERED ITEM OR SERVICE: HCPCS | Performed by: EMERGENCY MEDICINE

## 2018-11-22 PROCEDURE — 85049 AUTOMATED PLATELET COUNT: CPT | Performed by: INTERNAL MEDICINE

## 2018-11-22 PROCEDURE — 25000125 ZZHC RX 250: Performed by: INTERNAL MEDICINE

## 2018-11-22 PROCEDURE — 83605 ASSAY OF LACTIC ACID: CPT

## 2018-11-22 PROCEDURE — 87086 URINE CULTURE/COLONY COUNT: CPT | Performed by: EMERGENCY MEDICINE

## 2018-11-22 PROCEDURE — 83605 ASSAY OF LACTIC ACID: CPT | Performed by: EMERGENCY MEDICINE

## 2018-11-22 PROCEDURE — 83605 ASSAY OF LACTIC ACID: CPT | Performed by: INTERNAL MEDICINE

## 2018-11-22 PROCEDURE — 87040 BLOOD CULTURE FOR BACTERIA: CPT | Performed by: EMERGENCY MEDICINE

## 2018-11-22 PROCEDURE — 85025 COMPLETE CBC W/AUTO DIFF WBC: CPT | Performed by: EMERGENCY MEDICINE

## 2018-11-22 PROCEDURE — 96367 TX/PROPH/DG ADDL SEQ IV INF: CPT

## 2018-11-22 PROCEDURE — 25000132 ZZH RX MED GY IP 250 OP 250 PS 637: Performed by: EMERGENCY MEDICINE

## 2018-11-22 PROCEDURE — 96366 THER/PROPH/DIAG IV INF ADDON: CPT

## 2018-11-22 PROCEDURE — 81001 URINALYSIS AUTO W/SCOPE: CPT | Performed by: EMERGENCY MEDICINE

## 2018-11-22 PROCEDURE — 71046 X-RAY EXAM CHEST 2 VIEWS: CPT

## 2018-11-22 PROCEDURE — 83735 ASSAY OF MAGNESIUM: CPT | Performed by: INTERNAL MEDICINE

## 2018-11-22 PROCEDURE — 80053 COMPREHEN METABOLIC PANEL: CPT | Performed by: EMERGENCY MEDICINE

## 2018-11-22 PROCEDURE — 99207 ZZC NON-BILLABLE SERV PER CHARTING: CPT | Performed by: NURSE PRACTITIONER

## 2018-11-22 RX ORDER — SODIUM CHLORIDE 9 MG/ML
INJECTION, SOLUTION INTRAVENOUS CONTINUOUS
Status: DISCONTINUED | OUTPATIENT
Start: 2018-11-22 | End: 2018-11-22

## 2018-11-22 RX ORDER — ONDANSETRON 2 MG/ML
4 INJECTION INTRAMUSCULAR; INTRAVENOUS EVERY 6 HOURS PRN
Status: DISCONTINUED | OUTPATIENT
Start: 2018-11-22 | End: 2018-11-25 | Stop reason: HOSPADM

## 2018-11-22 RX ORDER — POTASSIUM CHLORIDE 1.5 G/1.58G
20-40 POWDER, FOR SOLUTION ORAL
Status: DISCONTINUED | OUTPATIENT
Start: 2018-11-22 | End: 2018-11-25 | Stop reason: HOSPADM

## 2018-11-22 RX ORDER — SCOLOPAMINE TRANSDERMAL SYSTEM 1 MG/1
1 PATCH, EXTENDED RELEASE TRANSDERMAL
Status: DISCONTINUED | OUTPATIENT
Start: 2018-11-22 | End: 2018-11-25 | Stop reason: HOSPADM

## 2018-11-22 RX ORDER — ALBUTEROL SULFATE 0.83 MG/ML
2.5 SOLUTION RESPIRATORY (INHALATION) EVERY 4 HOURS PRN
Status: DISCONTINUED | OUTPATIENT
Start: 2018-11-22 | End: 2018-11-25 | Stop reason: HOSPADM

## 2018-11-22 RX ORDER — LEVOFLOXACIN 5 MG/ML
750 INJECTION, SOLUTION INTRAVENOUS EVERY 24 HOURS
Status: DISCONTINUED | OUTPATIENT
Start: 2018-11-23 | End: 2018-11-24

## 2018-11-22 RX ORDER — POTASSIUM CHLORIDE 7.45 MG/ML
10 INJECTION INTRAVENOUS
Status: DISCONTINUED | OUTPATIENT
Start: 2018-11-22 | End: 2018-11-25 | Stop reason: HOSPADM

## 2018-11-22 RX ORDER — NALOXONE HYDROCHLORIDE 0.4 MG/ML
.1-.4 INJECTION, SOLUTION INTRAMUSCULAR; INTRAVENOUS; SUBCUTANEOUS
Status: DISCONTINUED | OUTPATIENT
Start: 2018-11-22 | End: 2018-11-25 | Stop reason: HOSPADM

## 2018-11-22 RX ORDER — CARBAMAZEPINE 100 MG/5ML
150 SUSPENSION ORAL EVERY 6 HOURS
Status: DISCONTINUED | OUTPATIENT
Start: 2018-11-22 | End: 2018-11-25 | Stop reason: HOSPADM

## 2018-11-22 RX ORDER — PROCHLORPERAZINE 25 MG
25 SUPPOSITORY, RECTAL RECTAL EVERY 12 HOURS PRN
Status: DISCONTINUED | OUTPATIENT
Start: 2018-11-22 | End: 2018-11-25 | Stop reason: HOSPADM

## 2018-11-22 RX ORDER — MAGNESIUM SULFATE HEPTAHYDRATE 40 MG/ML
4 INJECTION, SOLUTION INTRAVENOUS EVERY 4 HOURS PRN
Status: DISCONTINUED | OUTPATIENT
Start: 2018-11-22 | End: 2018-11-25 | Stop reason: HOSPADM

## 2018-11-22 RX ORDER — POTASSIUM CHLORIDE 1500 MG/1
20-40 TABLET, EXTENDED RELEASE ORAL
Status: DISCONTINUED | OUTPATIENT
Start: 2018-11-22 | End: 2018-11-25 | Stop reason: HOSPADM

## 2018-11-22 RX ORDER — AMOXICILLIN 250 MG
1 CAPSULE ORAL 2 TIMES DAILY PRN
Status: DISCONTINUED | OUTPATIENT
Start: 2018-11-22 | End: 2018-11-25 | Stop reason: HOSPADM

## 2018-11-22 RX ORDER — GUAR GUM
PACKET (EA) ORAL DAILY
Status: DISCONTINUED | OUTPATIENT
Start: 2018-11-22 | End: 2018-11-22

## 2018-11-22 RX ORDER — AMOXICILLIN 250 MG
1 CAPSULE ORAL 2 TIMES DAILY PRN
Status: DISCONTINUED | OUTPATIENT
Start: 2018-11-22 | End: 2018-11-22

## 2018-11-22 RX ORDER — SODIUM CHLORIDE, SODIUM LACTATE, POTASSIUM CHLORIDE, CALCIUM CHLORIDE 600; 310; 30; 20 MG/100ML; MG/100ML; MG/100ML; MG/100ML
INJECTION, SOLUTION INTRAVENOUS CONTINUOUS
Status: DISCONTINUED | OUTPATIENT
Start: 2018-11-22 | End: 2018-11-23

## 2018-11-22 RX ORDER — LEVOFLOXACIN 5 MG/ML
750 INJECTION, SOLUTION INTRAVENOUS ONCE
Status: DISCONTINUED | OUTPATIENT
Start: 2018-11-22 | End: 2018-11-23

## 2018-11-22 RX ORDER — POLYETHYLENE GLYCOL 3350 17 G/17G
17 POWDER, FOR SOLUTION ORAL DAILY PRN
Status: DISCONTINUED | OUTPATIENT
Start: 2018-11-22 | End: 2018-11-23

## 2018-11-22 RX ORDER — VANCOMYCIN HYDROCHLORIDE 1 G/200ML
1000 INJECTION, SOLUTION INTRAVENOUS EVERY 8 HOURS
Status: DISCONTINUED | OUTPATIENT
Start: 2018-11-22 | End: 2018-11-23

## 2018-11-22 RX ORDER — DEXTROSE MONOHYDRATE 25 G/50ML
25-50 INJECTION, SOLUTION INTRAVENOUS
Status: DISCONTINUED | OUTPATIENT
Start: 2018-11-22 | End: 2018-11-25 | Stop reason: HOSPADM

## 2018-11-22 RX ORDER — BISACODYL 10 MG
10 SUPPOSITORY, RECTAL RECTAL DAILY PRN
Status: DISCONTINUED | OUTPATIENT
Start: 2018-11-22 | End: 2018-11-25 | Stop reason: HOSPADM

## 2018-11-22 RX ORDER — LIDOCAINE 40 MG/G
CREAM TOPICAL
Status: DISCONTINUED | OUTPATIENT
Start: 2018-11-22 | End: 2018-11-25 | Stop reason: HOSPADM

## 2018-11-22 RX ORDER — PROCHLORPERAZINE 25 MG
25 SUPPOSITORY, RECTAL RECTAL EVERY 12 HOURS PRN
Status: DISCONTINUED | OUTPATIENT
Start: 2018-11-22 | End: 2018-11-22

## 2018-11-22 RX ORDER — CALCIUM CARBONATE 1250 MG/5ML
1250 SUSPENSION ORAL
Status: DISCONTINUED | OUTPATIENT
Start: 2018-11-22 | End: 2018-11-25 | Stop reason: HOSPADM

## 2018-11-22 RX ORDER — GUAR GUM
1 PACKET (EA) ORAL DAILY
Status: DISCONTINUED | OUTPATIENT
Start: 2018-11-22 | End: 2018-11-25 | Stop reason: HOSPADM

## 2018-11-22 RX ORDER — NICOTINE POLACRILEX 4 MG
15-30 LOZENGE BUCCAL
Status: DISCONTINUED | OUTPATIENT
Start: 2018-11-22 | End: 2018-11-25 | Stop reason: HOSPADM

## 2018-11-22 RX ORDER — PROCHLORPERAZINE MALEATE 5 MG
10 TABLET ORAL EVERY 6 HOURS PRN
Status: DISCONTINUED | OUTPATIENT
Start: 2018-11-22 | End: 2018-11-25 | Stop reason: HOSPADM

## 2018-11-22 RX ORDER — AMOXICILLIN 250 MG
2 CAPSULE ORAL 2 TIMES DAILY PRN
Status: DISCONTINUED | OUTPATIENT
Start: 2018-11-22 | End: 2018-11-25 | Stop reason: HOSPADM

## 2018-11-22 RX ORDER — CALCIUM CARBONATE 1250 MG/5ML
1250 SUSPENSION ORAL
Status: DISCONTINUED | OUTPATIENT
Start: 2018-11-22 | End: 2018-11-22

## 2018-11-22 RX ORDER — AMOXICILLIN 250 MG
2 CAPSULE ORAL 2 TIMES DAILY PRN
Status: DISCONTINUED | OUTPATIENT
Start: 2018-11-22 | End: 2018-11-22

## 2018-11-22 RX ORDER — SACCHAROMYCES BOULARDII 250 MG
250 CAPSULE ORAL 2 TIMES DAILY
Status: DISCONTINUED | OUTPATIENT
Start: 2018-11-22 | End: 2018-11-25 | Stop reason: HOSPADM

## 2018-11-22 RX ORDER — POTASSIUM CHLORIDE 29.8 MG/ML
20 INJECTION INTRAVENOUS
Status: DISCONTINUED | OUTPATIENT
Start: 2018-11-22 | End: 2018-11-25 | Stop reason: HOSPADM

## 2018-11-22 RX ORDER — SODIUM CHLORIDE, SODIUM LACTATE, POTASSIUM CHLORIDE, CALCIUM CHLORIDE 600; 310; 30; 20 MG/100ML; MG/100ML; MG/100ML; MG/100ML
INJECTION, SOLUTION INTRAVENOUS CONTINUOUS
Status: DISCONTINUED | OUTPATIENT
Start: 2018-11-22 | End: 2018-11-22

## 2018-11-22 RX ORDER — ACETAMINOPHEN 650 MG/1
650 SUPPOSITORY RECTAL EVERY 4 HOURS PRN
Status: DISCONTINUED | OUTPATIENT
Start: 2018-11-22 | End: 2018-11-25 | Stop reason: HOSPADM

## 2018-11-22 RX ORDER — CARBAMAZEPINE 100 MG/5ML
150 SUSPENSION ORAL ONCE
Status: COMPLETED | OUTPATIENT
Start: 2018-11-22 | End: 2018-11-22

## 2018-11-22 RX ORDER — ONDANSETRON 4 MG/1
4 TABLET, ORALLY DISINTEGRATING ORAL EVERY 6 HOURS PRN
Status: DISCONTINUED | OUTPATIENT
Start: 2018-11-22 | End: 2018-11-25 | Stop reason: HOSPADM

## 2018-11-22 RX ORDER — PROCHLORPERAZINE MALEATE 5 MG
10 TABLET ORAL EVERY 6 HOURS PRN
Status: DISCONTINUED | OUTPATIENT
Start: 2018-11-22 | End: 2018-11-22

## 2018-11-22 RX ORDER — LANOLIN ALCOHOL/MO/W.PET/CERES
6 CREAM (GRAM) TOPICAL AT BEDTIME
Status: DISCONTINUED | OUTPATIENT
Start: 2018-11-22 | End: 2018-11-25 | Stop reason: HOSPADM

## 2018-11-22 RX ORDER — POTASSIUM CL/LIDO/0.9 % NACL 10MEQ/0.1L
10 INTRAVENOUS SOLUTION, PIGGYBACK (ML) INTRAVENOUS
Status: DISCONTINUED | OUTPATIENT
Start: 2018-11-22 | End: 2018-11-25 | Stop reason: HOSPADM

## 2018-11-22 RX ADMIN — BRIVARACETAM 100 MG: 10 SOLUTION ORAL at 12:50

## 2018-11-22 RX ADMIN — HYDROCORTISONE SODIUM SUCCINATE 100 MG: 100 INJECTION, POWDER, FOR SOLUTION INTRAMUSCULAR; INTRAVENOUS at 15:38

## 2018-11-22 RX ADMIN — ENOXAPARIN SODIUM 40 MG: 40 INJECTION SUBCUTANEOUS at 05:57

## 2018-11-22 RX ADMIN — POTASSIUM & SODIUM PHOSPHATES POWDER PACK 280-160-250 MG 1 PACKET: 280-160-250 PACK at 22:38

## 2018-11-22 RX ADMIN — CEFEPIME HYDROCHLORIDE 2 G: 2 INJECTION, POWDER, FOR SOLUTION INTRAVENOUS at 13:35

## 2018-11-22 RX ADMIN — Medication 250 MG: at 13:40

## 2018-11-22 RX ADMIN — SODIUM CHLORIDE, POTASSIUM CHLORIDE, SODIUM LACTATE AND CALCIUM CHLORIDE: 600; 310; 30; 20 INJECTION, SOLUTION INTRAVENOUS at 13:35

## 2018-11-22 RX ADMIN — SODIUM CHLORIDE, POTASSIUM CHLORIDE, SODIUM LACTATE AND CALCIUM CHLORIDE 2136 ML: 600; 310; 30; 20 INJECTION, SOLUTION INTRAVENOUS at 01:26

## 2018-11-22 RX ADMIN — LEVOTHYROXINE SODIUM 137 MCG: 112 TABLET ORAL at 11:42

## 2018-11-22 RX ADMIN — VANCOMYCIN HYDROCHLORIDE 1000 MG: 1 INJECTION, SOLUTION INTRAVENOUS at 11:41

## 2018-11-22 RX ADMIN — CARBAMAZEPINE 150 MG: 100 SUSPENSION ORAL at 09:05

## 2018-11-22 RX ADMIN — Medication 250 MG: at 22:30

## 2018-11-22 RX ADMIN — VANCOMYCIN HYDROCHLORIDE 1000 MG: 1 INJECTION, SOLUTION INTRAVENOUS at 18:38

## 2018-11-22 RX ADMIN — CALCIUM CARBONATE 1250 MG: 1250 SUSPENSION ORAL at 17:49

## 2018-11-22 RX ADMIN — SODIUM CHLORIDE 1000 ML: 9 INJECTION, SOLUTION INTRAVENOUS at 09:02

## 2018-11-22 RX ADMIN — SCOPALAMINE 1 PATCH: 1 PATCH, EXTENDED RELEASE TRANSDERMAL at 09:06

## 2018-11-22 RX ADMIN — CEFEPIME HYDROCHLORIDE 2 G: 2 INJECTION, POWDER, FOR SOLUTION INTRAVENOUS at 02:37

## 2018-11-22 RX ADMIN — VANCOMYCIN HYDROCHLORIDE 1750 MG: 5 INJECTION, POWDER, LYOPHILIZED, FOR SOLUTION INTRAVENOUS at 03:09

## 2018-11-22 RX ADMIN — SODIUM CHLORIDE 1000 ML: 9 INJECTION, SOLUTION INTRAVENOUS at 15:38

## 2018-11-22 RX ADMIN — BRIVARACETAM 100 MG: 10 SOLUTION ORAL at 22:36

## 2018-11-22 RX ADMIN — Medication 1 PACKET: at 17:52

## 2018-11-22 RX ADMIN — INSULIN ASPART 2 UNITS: 100 INJECTION, SOLUTION INTRAVENOUS; SUBCUTANEOUS at 18:38

## 2018-11-22 RX ADMIN — CALCIUM CARBONATE 1250 MG: 1250 SUSPENSION ORAL at 13:39

## 2018-11-22 RX ADMIN — ACETAMINOPHEN 650 MG: 160 SUSPENSION ORAL at 00:55

## 2018-11-22 RX ADMIN — PANTOPRAZOLE SODIUM 40 MG: 40 TABLET, DELAYED RELEASE ORAL at 22:35

## 2018-11-22 RX ADMIN — POTASSIUM & SODIUM PHOSPHATES POWDER PACK 280-160-250 MG 1 PACKET: 280-160-250 PACK at 15:38

## 2018-11-22 RX ADMIN — HYDROCORTISONE SODIUM SUCCINATE 100 MG: 100 INJECTION, POWDER, FOR SOLUTION INTRAMUSCULAR; INTRAVENOUS at 10:21

## 2018-11-22 RX ADMIN — CARBAMAZEPINE 150 MG: 100 SUSPENSION ORAL at 13:39

## 2018-11-22 RX ADMIN — HYDROCORTISONE SODIUM SUCCINATE 100 MG: 100 INJECTION, POWDER, FOR SOLUTION INTRAMUSCULAR; INTRAVENOUS at 01:26

## 2018-11-22 RX ADMIN — CARBAMAZEPINE 150 MG: 100 SUSPENSION ORAL at 17:49

## 2018-11-22 RX ADMIN — INSULIN ASPART 2 UNITS: 100 INJECTION, SOLUTION INTRAVENOUS; SUBCUTANEOUS at 22:25

## 2018-11-22 RX ADMIN — SODIUM CHLORIDE, POTASSIUM CHLORIDE, SODIUM LACTATE AND CALCIUM CHLORIDE: 600; 310; 30; 20 INJECTION, SOLUTION INTRAVENOUS at 07:03

## 2018-11-22 RX ADMIN — SODIUM CHLORIDE 1000 ML: 9 INJECTION, SOLUTION INTRAVENOUS at 05:57

## 2018-11-22 RX ADMIN — BRIVARACETAM 100 MG: 10 SOLUTION ORAL at 09:05

## 2018-11-22 RX ADMIN — MELATONIN 6 MG: 3 TAB ORAL at 22:26

## 2018-11-22 ASSESSMENT — ACTIVITIES OF DAILY LIVING (ADL)
AMBULATION: 4-->COMPLETELY DEPENDENT
DRESS: 4-->COMPLETELY DEPENDENT
ADLS_ACUITY_SCORE: 41
TOILETING: 4-->COMPLETELY DEPENDENT
RETIRED_COMMUNICATION: 3-->UNABLE TO SPEAK (NOT RELATED TO LANGUAGE BARRIER)
RETIRED_EATING: 4-->COMPLETELY DEPENDENT
BATHING: 4-->COMPLETELY DEPENDENT
FALL_HISTORY_WITHIN_LAST_SIX_MONTHS: NO
SWALLOWING: 2-->DIFFICULTY SWALLOWING LIQUIDS/FOODS
ADLS_ACUITY_SCORE: 45
COGNITION: 2 - DIFFICULTY WITH ORGANIZING THOUGHTS
ADLS_ACUITY_SCORE: 45
TRANSFERRING: 4-->COMPLETELY DEPENDENT
ADLS_ACUITY_SCORE: 41

## 2018-11-22 ASSESSMENT — ENCOUNTER SYMPTOMS
FEVER: 1
SHORTNESS OF BREATH: 1
VOMITING: 0
DIARRHEA: 0
ABDOMINAL PAIN: 0

## 2018-11-22 NOTE — ED NOTES
[facility]  ED Nurse Handoff Report    ED Chief complaint: Aspiration (Patients wife called EMS for possible aspiration that occured around 11pm tonight. Patient has TBI and feeding tube. 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  HECTOR  If yes, what color did the patient score?  HECTOR    Patient Report: Initial Complaint: Patient with history of sepsis, aspiration with pneumonia, and traumatic brain injury who presents with his mother via EMS for evaluation of aspiration and fever. The patient's mother reports that he was fine at 9 PM tonight and was at his baseline. When she went in to give him his midnight medication she noticed he did not look right and was concerned for a possible aspiration. His temp at home was 104. No vomiting or diarrhea. No abdominal pain or chest pain. He reports his breathing is not good. She called an ambulance immediately. She reports that this is typical for the start of his episodes of pneumonia.     Focused Assessment: Cards:  Tachy  Resp: diminished LS and pneumonia  [place]: HECTOR, nonverbal related to TBI  GI: Feeding tube  Tests Performed: Labs, BC, UA/UC, CXR & EKG  Abnormal Results:   Results for orders placed or performed during the hospital encounter of 11/22/18   XR Chest 2 Views    Narrative    XR CHEST 2 VW  11/22/2018 2:24 AM     INDICATION: History of recurrent aspiration, sepsis.    COMPARISON: 11/2/2018.      Impression    IMPRESSION: Shallow inspiration. New mild infiltrate in the right  perihilar region. On the lateral view there is increasing opacity at  one of the lung bases, likely the right base. Findings are likely due  to increasing pneumonia, which could relate to aspiration. Stable  minimal left basilar atelectasis or infiltrate. Normal-sized cardiac  silhouette.    PANFILO KING MD   Comprehensive metabolic panel   Result Value Ref Range    Sodium 128 (L) 133 - 144 mmol/L    Potassium 4.3 3.4 - 5.3 mmol/L    Chloride 94 94 - 109 mmol/L    Carbon Dioxide 26 20 - 32 mmol/L    Anion Gap 8 3 - 14 mmol/L    Glucose 116 (H) 70 - 99 mg/dL    Urea Nitrogen 12 7 - 30 mg/dL    Creatinine 0.72 0.66 - 1.25 mg/dL    GFR Estimate >90 >60 mL/min/1.7m2    GFR Estimate If Black >90 >60 mL/min/1.7m2    Calcium 8.5 8.5 - 10.1 mg/dL    Bilirubin Total 0.5 0.2 - 1.3 mg/dL    Albumin 3.2 (L) 3.4 - 5.0 g/dL    Protein Total 7.7 6.8 - 8.8 g/dL    Alkaline Phosphatase 93 40 - 150 U/L    ALT 23 0 - 70 U/L    AST 15 0 - 45 U/L   Lactic acid whole blood   Result Value Ref Range    Lactic Acid 3.6 (H) 0.7 - 2.0 mmol/L   UA with Microscopic   Result Value Ref Range    Color Urine Yellow     Appearance Urine Clear     Glucose Urine Negative NEG^Negative mg/dL    Bilirubin Urine Negative NEG^Negative    Ketones Urine Negative NEG^Negative mg/dL    Specific Gravity Urine 1.012 1.003 - 1.035    Blood Urine Negative NEG^Negative    pH Urine 8.0 (H) 5.0 - 7.0 pH    Protein Albumin Urine 10 (A) NEG^Negative mg/dL    Urobilinogen mg/dL 2.0 0.0 - 2.0  mg/dL    Nitrite Urine Negative NEG^Negative    Leukocyte Esterase Urine Negative NEG^Negative    Source Catheterized Urine     WBC Urine 1 0 - 5 /HPF    RBC Urine 1 0 - 2 /HPF    Squamous Epithelial /HPF Urine <1 0 - 1 /HPF   Lactic acid   Result Value Ref Range    Lactic Acid 9.0 (HH) 0.4 - 2.0 mmol/L   Lactic acid   Result Value Ref Range    Lactic Acid 2.5 (H) 0.4 - 2.0 mmol/L   EKG 12 lead   Result Value Ref Range    Interpretation ECG Click View Image link to view waveform and result    ISTAT gases lactate gabe POCT   Result Value Ref Range    Ph Venous 7.43 7.32 - 7.43 pH    PCO2 Venous 37 (L) 40 - 50 mm Hg    PO2 Venous 38 25 - 47 mm Hg    Bicarbonate Venous 25 21 - 28 mmol/L    O2 Sat Venous 73 %    Lactic Acid 3.6 (H) 0.7 - 2.1 mmol/L   Influenza A/B antigen   Result Value Ref Range    Influenza A/B Agn Specimen Nasal     Influenza A Negative NEG^Negative    Influenza B Negative NEG^Negative     Treatments provided: 2L LR, Antibiotics, Tylenol, Steroids    Family Comments: Mother has gone home    OBS brochure/video discussed/provided to patient/family: N/A              Name of person given brochure if not patient: na              Relationship to patient: na    ED Medications:   Medications   lactated ringers infusion (not administered)   ceFEPIme (MAXIPIME) 2 g vial to attach to  ml bag for ADULTS or 50 ml bag for PEDS (0 g Intravenous Stopped 11/22/18 0308)   levofloxacin (LEVAQUIN) infusion 750 mg (not administered)   vancomycin (VANCOCIN) 1,750 mg in sodium chloride 0.9 % 500 mL intermittent infusion (1,750 mg Intravenous New Bag 11/22/18 0309)   lactated ringers BOLUS 2,136 mL (0 mLs Intravenous Stopped 11/22/18 0235)   acetaminophen (TYLENOL) solution 650 mg (650 mg Oral Given 11/22/18 0055)   hydrocortisone sodium succinate PF (solu-CORTEF) injection 100 mg (100 mg Intravenous Given 11/22/18 0126)       Drips infusing?:  Yes    For the majority of the shift this patient was Green.    Interventions performed were na.    Severe Sepsis OR Septic Shock Diagnosis Present:   Yes    Per the ED Provider, Time Zero for severe sepsis or septic shock is:  0110    3 Hour Severe Sepsis Bundle Completion:  1. Initial Lactic Acid Result:   Recent Labs   Lab Test  11/22/18   0300  11/22/18   0240  11/22/18   0126   LACT  2.5*  9.0*  3.6*     2. Blood Cultures before Antibiotics: yes  3. Broad Spectrum Antibiotics Administered:     Anti-infectives (Future)    Start     Dose/Rate Route Frequency Ordered Stop    11/22/18 0059  vancomycin (VANCOCIN) 1,750 mg in sodium chloride 0.9 % 500 mL intermittent infusion      1,750 mg  over 2 Hours Intravenous ONCE 11/22/18 0058      11/22/18 0054  levofloxacin (LEVAQUIN) infusion 750 mg      750 mg  100 mL/hr over 90 Minutes Intravenous ONCE 11/22/18 0053      11/22/18 0052  ceFEPIme (MAXIPIME) 2 g vial to attach to  ml bag for ADULTS or 50 ml bag for PEDS      2 g  over 30 Minutes Intravenous EVERY 12 HOURS 11/22/18 0053          4. 2100 ml of IV fluids have been given so far      6 Hour Severe Sepsis Bundle Completion:    1. Repeat Lactic Acid Level:   Last result   Lab Results   Component Value Date    LACT 2.5 (H) 11/22/2018     2. Patient currently on Vasopressors =  No    To be done/followed up on inpatient unit:  Iv antibiotics    ED NURSE PHONE NUMBER: *[phone]

## 2018-11-22 NOTE — IP AVS SNAPSHOT
[facility] [address] [facility]    [address] [place] [address]    Phone:  [phone]                                       After Visit Summary   11/22/2018    Keyon Farias    MRN: [id number]           After Visit Summary Signature Page     I have received my discharge instructions, and my questions have been answered. I have discussed any challenges I see with this plan with the nurse or doctor.    ..........................................................................................................................................  Patient/Patient Representative Signature      ..........................................................................................................................................  Patient Representative Print Name and Relationship to Patient    ..................................................               ................................................  Date                                   Time    ..........................................................................................................................................  Reviewed by Signature/Title    ...................................................              ..............................................  Date                                               Time          22EPIC Rev 08/18

## 2018-11-22 NOTE — PLAN OF CARE
Problem: Patient Care Overview  Goal: Plan of Care/Patient Progress Review  Outcome: No Change  Non-verbal, nods head and gives thumbs up or down to some questions.  Total care. Turn/repo q 2hrs. Follows commands.  NPO.  Feeding tube clamped.  R side hemiparesis.  Incontinent of bowel/bladder.  VSS except for tachy at times on room air.  Contact isolation.  Tele NSR.  Blanchable redness on coccyx; barrier cream applied.  LS diminished.  Lactic is now 2.7, up from 2.6.  Continue to monitor.

## 2018-11-22 NOTE — PLAN OF CARE
Problem: Patient Care Overview  Goal: Individualization & Mutuality  Outcome: No Change  Pt A&O to self, non-verbal, occasionally can follow commands. VSS on room air. Pt slightly combative/ uncooperative with cares this morning, requiring multiple staff. 1L bolus given this AM, lactic acid recheck this afternoon. Na+141. LR @ 100/hr. IV antibiotics. TF initiated, pt tolerating. q4 flushes, strict NPO. Pt incontinent bladder/ bowel. Condom cath in place. Turn repo q 2 hours, pt non blanchable redness on coccyx mepliex applied. Discharge pending.
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
## 2018-11-22 NOTE — PHARMACY-VANCOMYCIN DOSING SERVICE
Pharmacy Vancomycin Initial Note  Date of Service 2018  Patient's  1960  58 year old, male    Indication: Healthcare-Associated Pneumonia    Current estimated CrCl = Estimated Creatinine Clearance: 115.4 mL/min (based on Cr of 0.72).    Creatinine for last 3 days  2018:  1:20 AM Creatinine 0.72 mg/dL    Recent Vancomycin Level(s) for last 3 days  No results found for requested labs within last 72 hours.      Vancomycin IV Administrations (past 72 hours)                   vancomycin (VANCOCIN) 1,750 mg in sodium chloride 0.9 % 500 mL intermittent infusion (mg) 1,750 mg New Bag 18 0309                Nephrotoxins and other renal medications (Future)    Start     Dose/Rate Route Frequency Ordered Stop    18 1100  vancomycin (VANCOCIN) 1000 mg in dextrose 5% 200 mL PREMIX      1,000 mg  200 mL/hr over 1 Hours Intravenous EVERY 8 HOURS 18 0529            Contrast Orders - past 72 hours     None                Plan:  1.  Start vancomycin  1000 mg IV q8h.   2.  Goal Trough Level: 15-20 mg/L   3.  Pharmacy will check trough levels as appropriate in 1-3 Days.    4. Serum creatinine levels will be ordered daily for the first week of therapy and at least twice weekly for subsequent weeks.    5. [place] method utilized to dose vancomycin therapy: Method 1    Adarsh Moncada

## 2018-11-22 NOTE — CONSULTS
"CLINICAL NUTRITION SERVICES  -  ASSESSMENT NOTE      Recommendations Ordered by Registered Dietitian (RD):     Will run TF similar to home regimen  Isosource 1.5 (formula substitution for Jevity 1.5) at 100 mL/hr x 12 hrs (7am-7pm).  This provides 1200 mL/d = 1800 cals (25 cals/kg), 82 gm pro (1.2 gm/kg), 18 gm fiber, 912 mL free water    Will run 30 mL water flushes every 4 hrs (for tube patency) - MD to adjust flushes as able     Malnutrition: (11/22)  % Weight Loss:  None noted  % Intake:  No decreased intake noted  Subcutaneous Fat Loss:  None observed  Muscle Loss:  None observed  Fluid Retention:  None noted    Malnutrition Diagnosis: Patient does not meet two of the above criteria necessary for diagnosing malnutrition        REASON FOR ASSESSMENT  Keyon Farias is a 58 year old male seen by Registered Dietitian for Provider Order - Registered Dietitian to Assess and Order TF per Medical Nutrition protocol      NUTRITION HISTORY  Chart reviewed (mother not here and pt non-verbal)  Pt is known to our group from previous admits    Per Nutrition Assessment from (11/2/18):  Pt has been on TF via GJ tube since 8/2016; his last tube was placed 7/6/18, 16Fr TORY.  He lives with his mother, who is his caretaker.  He has been on Jevity 1.5 (5 cans daily) x 12 hr during the day, 7am - 7pm, at 100 ml/hr unless he is semi-recumbent, at which time it is decreased to 60 mL/hr.  This provides 1778kcal, 76g protein, 255g CHO, 27g fiber and 900mL free water  -Mother would like to keep rate at no more than 100mL/hr   H2O flushes with meds and 120 mL 4 times daily.    Home meds: multivitamin, neutra-phos, florastor, benefiber    Pt nods his head \"yes\" when asked if the TF was going well at home.        CURRENT NUTRITION ORDERS  Diet Order:     NPO    Per MD note - \"Holding free water flushes through PEG tube at this time while awaiting osmolality studies\"      PHYSICAL FINDINGS  Observed  No nutrition-related physical findings " "observed  Obtained from Chart/Interdisciplinary Team  16 [place] GJ tube  Blanchable redness on coccyx - WOCN consult placed    ANTHROPOMETRICS  Height: 6' 0\"  Weight:(11/22) 71.2 kg / 157 lbs 0 oz  Body mass index is 21.29 kg/(m^2).  Weight Status:  Normal BMI  IBW: 80.9 kg  % IBW: 88%  Weight History: stable  Wt Readings from Last 10 Encounters:   11/22/18 71.2 kg (157 lb)   11/04/18 71.5 kg (157 lb 10.1 oz)   09/22/18 70.3 kg (155 lb)   09/21/18 78 kg (171 lb 15.3 oz)   09/03/18 72.3 kg (159 lb 6.3 oz)   07/07/18 74.7 kg (164 lb 10.9 oz)   05/19/18 71.8 kg (158 lb 3.2 oz)   05/05/18 77.4 kg (170 lb 10.2 oz)   04/04/18 72.6 kg (160 lb)   03/13/18 70.3 kg (155 lb)         LABS  11/22: Na 128 (L)    MEDICATIONS  Lactated ringers 150 mL/hr      ASSESSED NUTRITION NEEDS PER APPROVED PRACTICE GUIDELINES:    Dosing Weight (11/22) 71.2 kg  Estimated Energy Needs: 2901-3478 kcals (25-30 Kcal/Kg)  Justification: maintenance  Estimated Protein Needs:  grams protein (1.2-1.5 g pro/Kg)  Justification: preservation of lean body mass  Estimated Fluid Needs: 3360-7929  mL (1 mL/Kcal)  Justification: maintenance OR per provider pending fluid status    MALNUTRITION:  % Weight Loss:  None noted  % Intake:  No decreased intake noted  Subcutaneous Fat Loss:  None observed  Muscle Loss:  None observed  Fluid Retention:  None noted    Malnutrition Diagnosis: Patient does not meet two of the above criteria necessary for diagnosing malnutrition      NUTRITION DIAGNOSIS:  No nutrition diagnosis identified at this time       NUTRITION INTERVENTIONS  Recommendations / Nutrition Prescription  NPO    Will run TF similar to home regimen  Isosource 1.5 (formula substitution for Jevity 1.5) at 100 mL/hr x 12 hrs (7am-7pm).  This provides 1200 mL/d = 1800 cals (25 cals/kg), 82 gm pro (1.2 gm/kg), 18 gm fiber, 912 mL free water    Will run 30 mL water flushes every 4 hrs (for tube patency) - MD to adjust flushes as " able  .      Implementation  Nutrition education: No education needs assessed at this time  EN Composition and EN Schedule: orders completed in EPIC  .      Nutrition Goals  EN to meet % est needs  .      MONITORING AND EVALUATION:  Progress towards goals will be monitored and evaluated per protocol and Practice Guidelines

## 2018-11-22 NOTE — ED NOTES
Bed: ED04  Expected date: 11/22/18  Expected time: 12:30 AM  Means of arrival: Ambulance  Comments:  Kerri M1 58M pneumonia symptoms

## 2018-11-22 NOTE — IP AVS SNAPSHOT
MRN:[id number]                      After Visit Summary   11/22/2018    Keyon Farias    MRN: [id number]           Thank you!     Thank you for choosing [facility] for your care. Our goal is always to provide you with excellent care. Hearing back from our patients is one way we can continue to improve our services. Please take a few minutes to complete the written survey that you may receive in the mail after you visit with us. Thank you!        Patient Information     Date Of Birth          1960        Designated Caregiver       Most Recent Value    Caregiver    Will someone help with your care after discharge? yes    Name of designated caregiver sina Nuñez    Phone number of caregiver     Caregiver address same as pt      About your hospital stay     You were admitted on:  November 22, 2018 You last received care in the:  [facility] [facility]    You were discharged on:  November 25, 2018        Reason for your hospital stay       Aspiration Pneumonia                  Who to Call     For medical emergencies, please call 911.  For non-urgent questions about your medical care, please call your primary care provider or clinic, [phone]          Attending Provider     Provider Specialty    Jose G Jones MD Emergency Medicine    Villavicencio, Jerald Cruz MD Internal Medicine       Primary Care Provider Office Phone # Fax #    Carlos Gomez [phone]       When to contact your care team       Call your primary doctor or contact your PMD if you have any of the following: temperature greater than 100.5 or less than 96, shortness of breath, lethargy, confusion, abdominal pain, severe diarrhea.                  After Care Instructions     Activity       Your activity upon discharge: activity as tolerated            Diet       Follow this diet upon discharge: Orders Placed This Encounter      Adult Formula Drip Feeding: Specified Time Isosource  "1.5; Other - Specify in Comment; Goal Rate: 100 mL/hr x 12 hrs; mL/hr; From: 7:00 AM; 7:00 PM; Medication - Tube Feeding Flush Frequency: At least 15-30 mL water before and after medication admi...      NPO for Medical/Clinical Reasons Except for: No Exceptions                  Follow-up Appointments     Follow-up and recommended labs and tests        Follow up with primary care provider, Carlos Gomez, within 7 days for hospital follow- up.  No follow up labs or test are needed.                  Additional Services     Home care nursing referral       Resume prior Home Care Services.                  Pending Results     Date and Time Order Name Status Description    11/22/2018 0050 Blood culture Preliminary     11/22/2018 0050 Blood culture Preliminary             Statement of Approval     Ordered          11/25/18 1109  I have reviewed and agree with all the recommendations and orders detailed in this document.  EFFECTIVE NOW     Approved and electronically signed by:  Cele Maldonado MD             Admission Information     Date & Time Provider Department Dept. Phone    11/22/2018 Villavicencio, Jerald Cruz MD [facility] [address] [facility] [phone]      Your Vitals Were     Blood Pressure Temperature Respirations Height Weight Pulse Oximetry    151/82 (BP Location: Right arm) 97.4  F (36.3  C) (Axillary) 20 1.829 m (6') 71.7 kg (158 lb 1.1 oz) 98%    BMI (Body Mass Index)                   21.44 kg/m2           [employer] Information     [employer] lets you send messages to your doctor, view your test results, renew your prescriptions, schedule appointments and more. To sign up, go to [email or web address] . Click on \"Log in\" on the left side of the screen, which will take you to the Welcome page. Then click on \"Sign up Now\" on the right side of the page.     You will be asked to enter the access code listed below, as well as some personal information. Please follow the directions to create your " username and password.     Your access code is: [email or web address]  Expires: 2019 11:58 AM     Your access code will  in 90 days. If you need help or a new code, please call your [facility] clinic or [phone].        Care EveryWhere ID     This is your Care EveryWhere ID. This could be used by other organizations to access your [facility] medical records  [id number]        Equal Access to Services     BRENDA LAO : Hadii timi ku hadasho Soomaali, waaxda luqadaha, qaybta kaalmada adeegyada, yaw parhamin haymagedn marlene moreland . So [facility] [phone].    ATENCIÓN: Si dirkla espдмитрий, tiene a king disposición servicios gratuitos de asistencia lingüística. Llame al [phone].    We comply with applicable federal civil rights laws and [place] laws. We do not discriminate on the basis of race, color, national origin, age, disability, sex, sexual orientation, or gender identity.               Review of your medicines      START taking        Dose / Directions    amoxicillin-clavulanate 500-125 MG per tablet   Commonly known as:  AUGMENTIN   Used for:  Aspiration pneumonia of right lung, unspecified aspiration pneumonia type, unspecified part of lung (H)   Notes to Patient:  Give every 8 hours        Dose:  1 tablet   1 tablet by Per G Tube route 3 times daily   Quantity:  18 tablet   Refills:  0       nystatin 636573 UNIT/ML suspension   Commonly known as:  MYCOSTATIN   Used for:  Thrush        Dose:  532128 Units   Swish and spit 5 mLs (500,000 Units) in mouth 4 times daily for 10 days   Quantity:  200 mL   Refills:  0         CONTINUE these medicines which have NOT CHANGED        Dose / Directions    acetaminophen 500 MG tablet   Commonly known as:  TYLENOL        Dose:  1000 mg   1,000 mg by Oral or FT or NG tube route every 6 hours as needed for mild pain   Refills:  0       albuterol (2.5 MG/3ML) 0.083% neb solution   Commonly known as:  PROVENTIL        Dose:  1 vial   Take 1 vial by nebulization every 4  hours as needed for shortness of breath / dyspnea or wheezing   Refills:  0       bacitracin ointment        Apply topically daily as needed for wound care To PEG site.   Refills:  0       BENEFIBER PO        Dose:  2 teaspoonful   Take 2 teaspoonful by mouth daily   Refills:  0       BRIVIACT 10 MG/ML solution   Generic drug:  Brivaracetam        Dose:  100 mg   100 mg by Oral or FT or NG tube route 2 times daily @0900 and 2100   Refills:  0       calcium carbonate 1250 (500 Ca) MG/5ML Susp suspension   Used for:  Malnutrition (H)        Dose:  1250 mg   5 mLs (1,250 mg) by Per J Tube route 3 times daily (with meals)   Quantity:  450 mL   Refills:  0       carBAMazepine 100 MG/5ML suspension   Commonly known as:  TEGretol        Dose:  150 mg   Take 150 mg by mouth every 6 hours At 06:00, 12:00, 18:00 and 24:00 for seizures   Refills:  0       CENTRUM SILVER tablet        Dose:  1 tablet   Take 1 tablet by mouth daily Crush and feed via j-tube   Refills:  0       * hydrocortisone 5 MG tablet   Commonly known as:  CORTEF        Dose:  10 mg   10 mg by Oral or FT or NG tube route every evening 2 tablets x 5mg=10mg   Refills:  0       * hydrocortisone 5 MG tablet   Commonly known as:  CORTEF        Dose:  15 mg   15 mg by Oral or FT or NG tube route every morning 3 tablets x 5mg=15mg   Refills:  0       levothyroxine 137 MCG tablet   Commonly known as:  SYNTHROID/LEVOTHROID        Dose:  137 mcg   137 mcg by Oral or FT or NG tube route daily   Refills:  0       melatonin 1 MG/ML Liqd liquid        Dose:  6 mg   6 mg by Jejunal Tube route At Bedtime   Refills:  0       pantoprazole 2 mg/mL Susp suspension   Commonly known as:  PROTONIX        Dose:  40 mg   40 mg by Per NG tube route daily   Refills:  0       potassium & sodium phosphates 280-160-250 MG Packet   Commonly known as:  NEUTRA-PHOS        Dose:  1 packet   Take 1 packet by mouth 3 times daily 0900, 1500, 2100.   Refills:  0       saccharomyces boulardii 250  MG capsule   Commonly known as:  FLORASTOR   Used for:  Altered bowel function        Dose:  250 mg   1 capsule (250 mg) by Oral or Feeding Tube route 2 times daily   Quantity:  60 capsule   Refills:  0       testosterone cypionate 200 MG/ML injection   Commonly known as:  DEPOTESTOSTERONE        Dose:  76 mg   Inject 76 mg into the muscle See Admin Instructions Every 2 weeks. 76 mg or 0.38 mL   Refills:  0       TRANSDERM-SCOP (1.5 MG) 1 MG/3DAYS 72 hr patch   Indication:  Increased Production of Saliva   Generic drug:  scopolamine        Dose:  1 patch   Place 1 patch onto the skin every 72 hours   Refills:  0       Vitamin D (Cholecalciferol) 1000 units Tabs        Dose:  2 tablet   Take 2 tablets by mouth every morning   Refills:  0       * Notice:  This list has 2 medication(s) that are the same as other medications prescribed for you. Read the directions carefully, and ask your doctor or other care provider to review them with you.         Where to get your medicines      These medications were sent to [facility] [place] - [address]  [address] [facility] [address], [place] [address]     Phone:  [phone]     amoxicillin-clavulanate 500-125 MG per tablet    nystatin 057881 UNIT/ML suspension                Protect others around you: Learn how to safely use, store and throw away your medicines at www.disposemymeds.org.        ANTIBIOTIC INSTRUCTION     You've Been Prescribed an Antibiotic - Now What?  Your healthcare team thinks that you or your loved one might have an infection. Some infections can be treated with antibiotics, which are powerful, life-saving drugs. Like all medications, antibiotics have side effects and should only be used when necessary. There are some important things you should know about your antibiotic treatment.      Your healthcare team may run tests before you start taking an antibiotic.    Your team may take samples (e.g., from your blood, urine or other areas)  to run tests to look for bacteria. These test can be important to determine if you need an antibiotic at all and, if you do, which antibiotic will work best.      Within a few days, your healthcare team might change or even stop your antibiotic.    Your team may start you on an antibiotic while they are working to find out what is making you sick.    Your team might change your antibiotic because test results show that a different antibiotic would be better to treat your infection.    In some cases, once your team has more information, they learn that you do not need an antibiotic at all. They may find out that you don't have an infection, or that the antibiotic you're taking won't work against your infection. For example, an infection caused by a virus can't be treated with antibiotics. Staying on an antibiotic when you don't need it is more likely to be harmful than helpful.      You may experience side effects from your antibiotic.    Like all medications, antibiotics have side effects. Some of these can be serious.    Let you healthcare team know if you have any known allergies when you are admitted to the hospital.    One significant side effect of nearly all antibiotics is the risk of severe and sometimes deadly diarrhea caused by Clostridium difficile (C. Difficile). This occurs when a person takes antibiotics because some good germs are destroyed. Antibiotic use allows C. diificile to take over, putting patients at high risk for this serious infection.    As a patient or caregiver, it is important to understand your or your loved one's antibiotic treatment. It is especially important for caregivers to speak up when patients can't speak for themselves. Here are some important questions to ask your healthcare team.    What infection is this antibiotic treating and how do you know I have that infection?    What side effects might occur from this antibiotic?    How long will I need to take this antibiotic?    Is  it safe to take this antibiotic with other medications or supplements (e.g., vitamins) that I am taking?     Are there any special directions I need to know about taking this antibiotic? For example, should I take it with food?    How will I be monitored to know whether my infection is responding to the antibiotic?    What tests may help to make sure the right antibiotic is prescribed for me?      Information provided by:  www.cdc.gov/getsmart  U.S. Department of Health and Human Services  Centers for disease Control and Prevention  National Center for Emerging and Zoonotic Infectious Diseases  Division of Healthcare Quality Promotion             Medication List: This is a list of all your medications and when to take them. Check marks below indicate your daily home schedule. Keep this list as a reference.      Medications           Morning Afternoon Evening Bedtime As Needed    acetaminophen 500 MG tablet   Commonly known as:  TYLENOL   1,000 mg by Oral or FT or NG tube route every 6 hours as needed for mild pain                                   albuterol (2.5 MG/3ML) 0.083% neb solution   Commonly known as:  PROVENTIL   Take 1 vial by nebulization every 4 hours as needed for shortness of breath / dyspnea or wheezing                                   amoxicillin-clavulanate 500-125 MG per tablet   Commonly known as:  AUGMENTIN   1 tablet by Per G Tube route 3 times daily   Next Dose Due:  8pm 11/25/18   Notes to Patient:  Give every 8 hours            4am       12pm       8pm               bacitracin ointment   Apply topically daily as needed for wound care To PEG site.                                   BENEFIBER PO   Take 2 teaspoonful by mouth daily                                   BRIVIACT 10 MG/ML solution   100 mg by Oral or FT or NG tube route 2 times daily @0900 and 2100   Last time this was given:  100 mg on 11/25/2018  9:55 AM   Generic drug:  Brivaracetam                                      calcium  carbonate 1250 (500 Ca) MG/5ML Susp suspension   5 mLs (1,250 mg) by Per J Tube route 3 times daily (with meals)                                         carBAMazepine 100 MG/5ML suspension   Commonly known as:  TEGretol   Take 150 mg by mouth every 6 hours At 06:00, 12:00, 18:00 and 24:00 for seizures   Last time this was given:  150 mg on 11/25/2018 11:10 AM   Next Dose Due:  6pm 11/25/18                                CENTRUM SILVER tablet   Take 1 tablet by mouth daily Crush and feed via j-tube                                   * hydrocortisone 5 MG tablet   Commonly known as:  CORTEF   10 mg by Oral or FT or NG tube route every evening 2 tablets x 5mg=10mg   Last time this was given:  15 mg on 11/25/2018  9:55 AM                                   * hydrocortisone 5 MG tablet   Commonly known as:  CORTEF   15 mg by Oral or FT or NG tube route every morning 3 tablets x 5mg=15mg   Last time this was given:  15 mg on 11/25/2018  9:55 AM                                   levothyroxine 137 MCG tablet   Commonly known as:  SYNTHROID/LEVOTHROID   137 mcg by Oral or FT or NG tube route daily   Last time this was given:  137 mcg on 11/25/2018  8:20 AM                                   melatonin 1 MG/ML Liqd liquid   6 mg by Jejunal Tube route At Bedtime                                   nystatin 263165 UNIT/ML suspension   Commonly known as:  MYCOSTATIN   Swish and spit 5 mLs (500,000 Units) in mouth 4 times daily for 10 days   Last time this was given:  500,000 Units on 11/25/2018  1:09 PM   Next Dose Due:  6pm 11/25/18            6am       12pm       6pm       12am           pantoprazole 2 mg/mL Susp suspension   Commonly known as:  PROTONIX   40 mg by Per NG tube route daily   Last time this was given:  40 mg on 11/24/2018 10:04 PM   Next Dose Due:  10pm 11/25/18                                   potassium & sodium phosphates 280-160-250 MG Packet   Commonly known as:  NEUTRA-PHOS   Take 1 packet by mouth 3 times daily  0900, 1500, 2100.   Last time this was given:  1 packet on 11/25/2018  2:43 PM                                         saccharomyces boulardii 250 MG capsule   Commonly known as:  FLORASTOR   1 capsule (250 mg) by Oral or Feeding Tube route 2 times daily   Last time this was given:  250 mg on 11/25/2018  8:21 AM                                      testosterone cypionate 200 MG/ML injection   Commonly known as:  DEPOTESTOSTERONE   Inject 76 mg into the muscle See Admin Instructions Every 2 weeks. 76 mg or 0.38 mL                                TRANSDERM-SCOP (1.5 MG) 1 MG/3DAYS 72 hr patch   Place 1 patch onto the skin every 72 hours   Last time this was given:  1 patch on 11/24/2018  3:23 PM   Generic drug:  scopolamine   Next Dose Due:  11/27/18 3pm                                Vitamin D (Cholecalciferol) 1000 units Tabs   Take 2 tablets by mouth every morning                                   * Notice:  This list has 2 medication(s) that are the same as other medications prescribed for you. Read the directions carefully, and ask your doctor or other care provider to review them with you.              More Information        Methicillin-Resistant Staphylococcus aureus (MRSA)  What is MRSA?  Staphylococcus aureus bacteria or  staph  is a very common germ. It is found on the skin or in the nose of many people. Sometimes the bacteria causes no problem, or it causes a mild infection. But it can also cause severe infections of the skin, lungs, blood, or other organs or tissues. Some staph infections can be easily treated with antibiotics. But one type of staph, Methicillin-Resistant staphylococcus areas (MRSA) can t. It s called Methicillin-Resistant because the antibiotic methicillin, which used to be effective treatment, no longer works. MRSA is common in hospitals and nursing homes or long-term care facilities. It is also spreading among healthy children and adults outside the health care system. There are two  different ways MRSA can appear in the body:      Colonization: When a person carries the MRSA bacteria (often in nose or on skin), but is healthy. This person can spread MRSA to others.    Infection: When a person gets sick because of the bacteria, it's called being infected with MRSA. This person can also spread MRSA to others. If not treated properly, MRSA infections can be very serious.  What are the symptoms of MRSA infection?  MRSA skin infections start as small red bumps on the skin that look like pimples or spider bites. The small bumps usually get larger and become swollen, painful, warm to the touch and filled with pus. MRSA can also start in other ways, and it can spread deeper into the body. Common places and symptoms include:    Urinary tract: pain and burning when urinating, the need to urinate more often, fever    Blood: high fever, chills, nausea and vomiting, shortness of breath, confusion    Bone, muscle, or other tissue infections    Lungs: pneumonia in one or both lungs    Surgical wound infections    Heart: Infection of the lining of the heart called endocarditis  Who s at risk?  Anyone can get a staph infection. But certain factors make infection more likely, including:    A current or recent stay in the hospital    Living in a nursing home or long-term care facility or other crowded areas, like  barracks or long-term    Taking antibiotics    Having certain health conditions, such as diabetes or HIV    Getting kidney dialysis    Sharing sports equipment, razors or other sharp objects  How does MRSA spread?  MRSA usually spreads through skin-to-skin contact, whether through contact with an infected person or on the hands of health care workers who work with infected patients. MRSA can also spread through contact with contaminated objects, such as cart handles and bedrails or shared towels or athletic equipment.  How is MRSA treated?  MRSA infections are usually treated with antibiotics. These  may be in pill form or through a vein (intravenous or IV). If you have a skin abscess, we may drain it.   Patients who test positive for MRSA colonization may go through a process called decolonization. We apply a topical antibiotic inside your nose to kill the bacteria. We may also wash your skin with a special soap.  Controlling and Preventing MRSA: In the hospital  Hospitals and nursing homes control and prevent MRSA by doing the following:    Handwashing. This is the single most important way to prevent the spread of germs.    Protective clothing. Health care workers and visitors may wear gloves and a gown when entering the room of a patient with MRSA. They remove these items before leaving.    Private rooms. Patients with MRSA infections are placed in private rooms or in a room with others who have the same infection.    Avoid antibiotic overuse. Too much use can cause germs to resist some antibiotics.    Monitoring. Hospitals monitor the spread of MRSA and educate all staff on the best ways to prevent it.  What you can do as a patient    Ask all hospital staff to wash their hands before touching you. Don t be afraid to speak up!    Wash your own hands often with soap and warm water, or use an alcohol-based hand gel. This is especially important:  ? After using the bathroom  ? After touching a bandage  ? Before eating    Encourage family and friends to wash hands as well    If you need to have a test done, such as an X-ray, follow instructions from staff. You may need to change into a clean hospital gown and wash your hands just before leaving your room.  Controlling and Preventing MRSA: at Home  Patients:    Wash your hands often with soap and warm water, or use an alcohol-based hand gel. This is especially important:  ? After using the bathroom  ? After touching a bandage  ? Before eating    Follow instructions we give you for caring for surgical wounds or any tubes that you have, such as a catheter or  dialysis port.    Keep cuts and scrapes clean and covered until they heal.    Do not share towels, razors, clothing or athletic equipment.    Take all antibiotics your doctor prescribed. Don t take half doses or stop the antibiotics, even if you feel better.  Caregivers:    Wash your hands well with soap and warm water before and after any contact with the patient. You can use an alcohol-based hand gel if your hands aren t visibly dirty.    Wear disposable gloves when changing a bandage, touching an infected wound or handling dirty laundry. Throw away the gloves after each use. Then wash your hands well.    Change the patient s bedding once a week, or more often if it s soiled with feces or body fluids. Wash and dry it alone in a washer and dryer using the warmest temperatures recommended on the labels. Use liquid bleach during the wash cycle if the label permits.    Clean surfaces like tabletops and sinks really well. Keep bathrooms, toilets and bedside commodes clean. A mixture of 1/4 cup of bleach to 1 quart of water works great for this.  Understanding drug resistance  Hard-to-kill (resistant) germs, such as MRSA, develop when antibiotics are taken longer than needed. They can also develop when antibiotics are taken when they aren't needed, or are not taken exactly as directed. This is because any germs that survive treatment with an antibiotic can multiply and thus create more resistant germs. The more often antibiotics are used, the more chances resistant germs have to develop. This is why your care team may not prescribe antibiotics unless he or she is certain that they are needed.  Date Last Reviewed: 10/1/2016    6405-7918 [employer]. [address] [place] [address], [place] [address]. All rights reserved. This information is not intended as a substitute for professional medical care. Always follow your healthcare professional's instructions.  This information has been modified by your health care  provider with permission from the publisher.                Vancomycin-resistant enterococci (VRE)    What is VRE?  Enterococcus bacteria is a germ that lives in the intestinal tract and the mouth. Sometimes the bacteria causes no problem, or it causes a mild infection. Some Enterococcus infections can be easily treated with antibiotics. But vancomycin-resistant enterococci (VRE) can t. It s called vancomycin-resistant because the antibiotic vancomycin, which used to be effective treatment, no longer works. VRE germs are often resistant to many other antibiotics as well. There are two different ways VRE can appear in the body:     Colonization: When a person carries the VRE bacteria but is healthy. This person can spread VRE to others.    Infection: When a person gets sick because of the bacteria, it's called being infected with VRE. This person can also spread VRE to others. If not treated properly, VRE infections can be very serious.  What are the symptoms of VRE infection?  VRE causes different symptoms, depending on where the infection is. Common places and symptoms include    Urinary tract: pain and burning when urinating, the need to urinate more often, fever    Skin wound: redness of the skin around the wound and oozing of fluid from the wound    Blood: high fever, chills, nausea and vomiting, shortness of breath, confusion    Heart: Infection of the lining of the heart (endocarditis)  Who s at risk?  Anyone can get a VRE infection. But certain factors make infection more likely, including:    Current or recent care at a healthcare facility    A recent surgery    Having the VRE germ in your system and getting sick with something else    Using antibiotics for a long period of time    Having a weakened immune system    Having a medical device that stays in the body for a time, such as urinary or intravenous (IV) catheters  How does VRE spread?  Ways that VRE can spread include:     Someone who is colonized or  infected with VRE touches you with unwashed hands.    You touch objects or surfaces that have the germs.    Healthcare workers touch you without washing their hands properly after contact with an infected patient, object, or surface.  How is VRE treated?  Because VRE germs are resistant to many kinds of antibiotics, your care team will tell you how you ll be treated. Most likely you will take a combination of several antibiotics.  Controlling and Preventing VRE: In the hospital  Hospitals and nursing homes control and prevent VRE by doing the following:    Handwashing. This is the single most important way to prevent the spread of germs.    Protective clothing. Health care workers and visitors may wear gloves and a gown when entering the room of a patient with VRE. They remove these items before leaving.    Private rooms. Patients with VRE infections are placed in private rooms or in a room with others who have the same infection.    Avoid antibiotic overuse. Too much use can cause germs to resist some antibiotics.    Monitoring. Hospitals monitor the spread of VRE and educate all staff on the best ways to prevent it.  What you can do as a patient?    Ask all hospital staff to wash their hands before touching you. Don t be afraid to speak up!    Wash your own hands often with soap and warm water, or use an alcohol-based hand gel. This is especially important:  ? After using the bathroom  ? After touching a bandage  ? Before eating    Encourage family and friends to wash hands as well    If you need to have a test done, such as an X-ray, follow instructions from staff. You may need to change into a clean hospital gown and wash your hands just before leaving your room.  Controlling and Preventing VRE: at Home  Patients:    Wash your hands often with soap and warm water, or use an alcohol-based hand gel. This is especially important:  ? After using the bathroom  ? After touching a bandage  ? Before eating    Follow  instructions we give you for caring for surgical wounds or any tubes that you have, such as a catheter or dialysis port.    Keep cuts and scrapes clean and covered until they heal.    Do not share towels, razors, clothing or athletic equipment.    Take all antibiotics your doctor prescribed. Don t take half doses or stop the antibiotics, even if you feel better.  Caregivers:    Wash your hands well with soap and warm water before and after any contact with the patient. You can use an alcohol-based hand gel if your hands aren t visibly dirty.    Wear disposable gloves when changing a bandage, touching an infected wound or handling dirty laundry. Throw away the gloves after each use. Then wash your hands well.    Change the patient s bedding once a week, or more often if it s soiled with feces or body fluids. Wash and dry it alone in a washer and dryer using the warmest temperatures recommended on the labels. Use liquid bleach during the wash cycle if the label permits.    Clean surfaces like tabletops and sinks really well. Keep bathrooms, toilets and bedside commodes clean. A mixture of 1/4 cup of bleach to 1 quart of water works great for this.  Understanding drug resistance  Hard-to-kill (resistant) germs, such as VRE, develop when antibiotics are taken longer than needed. They can also develop when antibiotics are taken when they aren't needed, or are not taken exactly as directed. This is because any germs that survive treatment with an antibiotic can multiply and thus create more resistant germs. The more often antibiotics are used, the more chances resistant germs have to develop. This is why your care team may not prescribe antibiotics unless he or she is certain that they are needed.  Date Last Reviewed: 2/1/2017 2000-2017 [employer]. [address] [place] [address], [place] [address]. All rights reserved. This information is not intended as a substitute for professional medical care. Always follow  your healthcare professional's instructions.  This information has been modified by your health care provider with permission from the publisher.

## 2018-11-22 NOTE — PLAN OF CARE
Admission    Patient arrives to room 608 via cart from ED.      Inpatient nursing criteria listed below were met:    PCD's Documented: NA-none ordered  Skin issues/needs documented : Yes  Isolation education started/completed: No  Patient allergies verified with patient: No  Verified completion of [facility] Risk Assessment Tool:  No-non verbal  Verified completion of Guardianship screening tool: Yes  Fall Prevention: Care plan updated, Education given and documented Yes  Care Plan initiated: Yes  Home medications documented in belongings flowsheet: NA  Patient belongings documented in belongings flowsheet: Yes  Reminder note (belongings/ medications) placed in discharge instructions:NA  Admission profile/ required documentation complete: No- non verbal

## 2018-11-22 NOTE — PLAN OF CARE
RECEIVING UNIT ED HANDOFF REVIEW    ED Nurse Handoff Report was reviewed by: Nidia Oconnor on November 22, 2018 at 4:43 AM
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
## 2018-11-22 NOTE — ED NOTES
DATE:  11/22/2018   TIME OF RECEIPT FROM LAB:  0255  LAB TEST:  Lactic  LAB VALUE:  9.0  RESULTS GIVEN WITH READ-BACK TO (PROVIDER):  Dr. Jones  TIME LAB VALUE REPORTED TO PROVIDER:   0256

## 2018-11-22 NOTE — ED PROVIDER NOTES
History     Chief Complaint:  Aspiration    HPI   Keyon Farias is a 58 year old male with a history of sepsis, aspiration with pneumonia, pneumonia, and traumatic brain injury who presents with his wife via EMS to the Emergency Department today for evaluation of aspiration. The patient's wife reports that he was fine at 9 PM tonight and was at his baseline. When she went in to give him his midnight medication she noticed he did not look right and was concerned for a possible aspiration. She touched his neck and forehead and noticed he was warm. His temp at home was 104. No vomiting or diarrhea. No abdominal pain or chest pain. He reports his breathing is not good. She called an ambulance immediately. She reports that this is typical for the start of his episodes of pneumonia.    Allergies:  Dilantin  Valproic acid: toxicity c bone marrow suspension, elevated ammonia levels     Medications:    Albuterol neb  Briviact solution  Tegretol   Cortef  Levaquin  Levothyroxine  Melatonin  Multivitamin  Neutra-phos  Zantac  Florastor  Testosterone cypionate  Vitamin D  Benefiber     Past Medical History:    Sepsis  Aspiration pneumonia  Fever  Encephalopathy  Necrotizing pancreatitis  Cardiac arrest  Acute respiratory failure with hypoxia  Pneumonia  PEG tube malfunction  Hyponatremia  Intractable epilepsy  Breakthrough seizure  Septic shock  Seizures  Hematemesis   Appendicitis  Panhypopituitarism   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 No pertinent family history.     Social History:  Smoking status: Former smoker quit date 4/23/1989  Alcohol  use: No  Marital Status:  Single [1]     Review of Systems   Constitutional: Positive for fever.   Respiratory: Positive for shortness of breath.    Cardiovascular: Negative for chest pain.   Gastrointestinal: Negative for abdominal pain, diarrhea and vomiting.   All other systems reviewed and are negative.    Physical Exam     Patient Vitals for the past 24 hrs:   BP Temp Temp src Heart Rate Resp SpO2 Height Weight   11/22/18 0400 120/72 - - 113 24 94 % - -   11/22/18 0330 128/70 - - 116 18 92 % - -   11/22/18 0245 - 100.9  F (38.3  C) Axillary 115 18 93 % - -   11/22/18 0230 125/64 - - - - - - -   11/22/18 0200 118/58 - - 123 (!) 32 93 % - -   11/22/18 0145 122/64 - - 124 (!) 35 94 % - -   11/22/18 0130 112/66 - - 131 26 95 % - -   11/22/18 0115 106/61 - - 134 19 95 % - -   11/22/18 0100 117/75 - - 133 26 97 % - -   11/22/18 0045 - - - - - 97 % - -   11/22/18 0040 111/68 103.7  F (39.8  C) Axillary 127 18 96 % 1.829 m (6') 71.2 kg (157 lb)       Physical Exam  VS: Reviewed per above  HENT: Mucous membranes moist  EYES: sclera anicteric  CV: Rate as noted, regular rhythm.   RESP: Effort normal. Breath sounds are normal bilaterally.  GI: no tenderness, not distended.  G-tube site intact.  NEURO: Alert, nods head yes or no to intermittent questions.  Limited movement of all 4 extremities/difficult to assess due to underlying cognitive impairment.  MSK: No deformity of the extremities  SKIN: Warm and dry    Emergency Department Course     ECG (1:02:10):  Rate 131 bpm. LA interval 146. QRS duration 86. QT/QTc 310/457. P-R-T axes 67 -48 65. Sinus tachycardia. Left anterior fascicular block. Abnormal ECG. No significant change when compared to EKG dated 5/15/18. Interpreted at 0105 by Jose G Jones MD.    Imaging:  Radiographic findings were communicated with the patient and family who voiced understanding of the findings.  XR Chest 2 Views  IMPRESSION: Shallow inspiration. New mild infiltrate in the right  perihilar  region. On the lateral view there is increasing opacity at  one of the lung bases, likely the right base. Findings are likely due  to increasing pneumonia, which could relate to aspiration. Stable  minimal left basilar atelectasis or infiltrate. Normal-sized cardiac  silhouette. As read by radiology.    Laboratory:  CBC: WBC 11.7 (H), HGB 12.4 (L) o/w WNL ()  CMP:  (L), Glucose 116 (H), Albumin 3.2 (L) o/w WNL (Creatinine 0.72)  0126: ISTAT gases lactate gabe POCT: PCO2 37 (L), Lactic acid 3.6 (H) o/w WNL  0304: ISTAT gases lactate gabe POCT: pH 7.45 (H), PCO2 65 (H), Lactic acid 2.6 (H) o/w WNL    0137: Lactic acid whole blood: 3.6 (H)  0255: Lactic acid: 9.0 (HH)  0312: Lactic acid: 2.5 (H)    Blood culture: In process  Blood culture: In process    Influenza A/B antigen: Negative    UA: pH 8.0 (H), Albumin 10, o/w negative  Urine Culture: In process    Interventions:  0055: Acetaminophen 650 MG PO  0126: lactated ringer bolus 2,136 mL IV  0126: solu-Cortef 100 MG IV  0237: Maxipime 2 G IV  0309: Vancocin 1750 MG IV  Pending: Levaquin 750 MG IV    Emergency Department Course:  Past medical records, nursing notes, and vitals reviewed.  0112: I performed an exam of the patient and obtained history, as documented above.    IV inserted and blood drawn.    The patient was sent for a chest x-ray while in the emergency department, findings above.    Findings and plan explained to the Patient and spouse who consents to admission.     0240: Discussed the patient with Dr. Villavicencio, who will admit the patient to a [facility] bed for further monitoring, evaluation, and treatment.     Impression & Plan      CMS Diagnoses:   The patient has signs of Severe Sepsis as evidenced by:    1. 2 SIRS criteria, AND  2. Suspected infection, AND   3. Organ dysfunction: Lactic Acid > 1.9    Time severe sepsis diagnosis confirmed = 137AM as this was the time when Lactate resulted, and the level was > 1.9      3 Hour Severe Sepsis Bundle  Completion:  1. Initial Lactic Acid Result:   Recent Labs   Lab Test  11/22/18   0304  11/22/18   0300  11/22/18   0240   LACT  2.6*  2.5*  9.0*     2. Blood Cultures before Antibiotics: Yes  3. Broad Spectrum Antibiotics Administered: Yes     Anti-infectives (Future)    Start     Dose/Rate Route Frequency Ordered Stop    11/22/18 0054  levofloxacin (LEVAQUIN) infusion 750 mg      750 mg  100 mL/hr over 90 Minutes Intravenous ONCE 11/22/18 0053 11/22/18 0052  ceFEPIme (MAXIPIME) 2 g vial to attach to  ml bag for ADULTS or 50 ml bag for PEDS      2 g  over 30 Minutes Intravenous EVERY 12 HOURS 11/22/18 0053          4. 2136 ml of IV fluids.  Ideal body weight: 77.6 kg (171 lb 1.2 oz)    Severe Sepsis reassessment:  1. Repeat Lactic Acid Level: 2.5  2. MAP>65 after initial IVF bolus, will continue to monitor fluid status and vital signs  I attest to having performed a repeat sepsis exam and assessment of perfusion at 315 and the results demonstrate improved perfusion.      Medical Decision Making:    Patient presents to the ER with his mother for evaluation of fever.  Initial vital signs are notable for heart rate of 133, temperature of 103.7  F.  He was normotensive with normal SPO2 on room air.  It sounds as though symptoms started rather acutely this evening.  Per mother and chart review he has a history of recurrent aspiration pneumonia.  On exam there is no nuchal rigidity to suggest meningitis, no evidence of skin or soft tissue infections, abdomen is soft making infectious intra-abdominal process less likely.  Urinalysis was notable for no evidence of infection.  Chest x-ray revealed new right perihilar infiltrate concerning for pneumonia.  Sepsis order set was initiated per above, he was given broad-spectrum antibiotics including vancomycin, cefepime, Levaquin.  He was given stress dose steroids- 100 mg of hydrocortisone given history of panhypopituitarism and chronic steroid use.  Repeat lactate  after IV fluids improved to 2.5 (repeat was initially 9 but this was thought to be spurious and recheck immediately after this resulted as 2.5 on both i-STAT and lab samples).  He was admitted to the hospital in stable condition.      Diagnosis:    ICD-10-CM    1. Pneumonia of right lung due to infectious organism, unspecified part of lung J18.9 Lactic acid     Lactic acid   2. Sepsis, due to unspecified organism (H) A41.9      Disposition:  Admitted    Shea Ramos  11/22/2018    EMERGENCY DEPARTMENT  Scribe Disclosure:  Josemanuel DYKESah Richard, am serving as a scribe at 1:12 AM on 11/22/2018 to document services personally performed by Jose G Jones MD based on my observations and the provider's statements to me.        Jose G Jones MD  11/22/18 0514

## 2018-11-22 NOTE — PROVIDER NOTIFICATION
Brief update, full H&P pending:    Patient admitted this morning with severe sepsis mostly notable for lactic acid elevation to 3.6, tachycardia.  Following signout with plan for medicine floor admission, patient's lactic acid jumped to 9.0.  Improvement back to 2.5 on recheck.    patient is well-known to the hospital with recurrent episodes of aspiration, recently discharged for the same.    Patient received stress dose steroids for panhypopituitarism as well as broad-spectrum anti-infectives in the emergency department including vancomycin, cefepime, levofloxacin.    Continuing anti-infectives  Stress dose steroids have been ordered at 100 mg Solu-Cortef every 8 hours for a total of 4 doses followed by 50 mg every 8 hours x3 doses.  Taper will then include 50 mg q12 hr. Dosing to be reassessed and adjusted as needed, likely prior to q12 hr timing of schedule.    Patient remains n.p.o.  Has a feeding tube in place.    One-time dose via feeding tube for both 9 AM bradycardia act and 6 AM Tegretol have been ordered while awaiting pharmacy reconciliation of medications.  Home medications will need to be reordered following reconciliation.    Jerald Villavicencio MD  5:59 AM

## 2018-11-22 NOTE — PROGRESS NOTES
[facility]    Sepsis Evaluation Progress Note    Date of Service: 11/22/2018    I was called to see Keyon GRAHAM Jarrett due to abnormal vital signs triggering the Sepsis SIRS screening alert. He is known to have an infection.     Physical Exam    Vital Signs:  Temp: 98  F (36.7  C) Temp src: Axillary BP: 108/64   Heart Rate: 95 Resp: 20 SpO2: 100 % O2 Device: None (Room air)      Lab:  Lactic Acid   Date Value Ref Range Status   11/22/2018 3.2 (H) 0.7 - 2.0 mmol/L Final     Lactate for Sepsis Protocol   Date Value Ref Range Status   09/03/2018 1.5 0.7 - 2.0 mmol/L Final       The patient is at baseline mental status.    The rest of their physical exam is significant for mild tachycardia    Assessment and Plan    The SIRS and exam findings are likely due to   sepsis.     ID: The patient is currently on the following antibiotics:  Anti-infectives (Future)    Start     Dose/Rate Route Frequency Ordered Stop    11/23/18 0000  levofloxacin (LEVAQUIN) infusion 750 mg      750 mg  100 mL/hr over 90 Minutes Intravenous EVERY 24 HOURS 11/22/18 0518      11/22/18 1100  vancomycin (VANCOCIN) 1000 mg in dextrose 5% 200 mL PREMIX      1,000 mg  200 mL/hr over 1 Hours Intravenous EVERY 8 HOURS 11/22/18 0529      11/22/18 0054  levofloxacin (LEVAQUIN) infusion 750 mg      750 mg  100 mL/hr over 90 Minutes Intravenous ONCE 11/22/18 0053      11/22/18 0052  ceFEPIme (MAXIPIME) 2 g vial to attach to  ml bag for ADULTS or 50 ml bag for PEDS      2 g  over 30 Minutes Intravenous EVERY 12 HOURS 11/22/18 0053          Current antibiotic coverage is appropriate for source of infection.    Fluid: Fluid bolus ordered.    Lab: Repeat lactic acid ordered for 2 hours from now.    Disposition: The patient will remain on the current unit. We will continue to monitor this patient closely.    Cele Maldonado MD

## 2018-11-22 NOTE — PHARMACY-ADMISSION MEDICATION HISTORY
"Admission medication history interview status for the 11/22/2018  admission is complete. See EPIC admission navigator for prior to admission medications     Medication history source reliability:Good    Actions taken by pharmacist (provider contacted, etc):Interviewed mother- Savannah- over phone. Reviewed recent office visit info from [facility].      Additional medication history information not noted on PTA med list :  Mother notes patients is not taking ranitidine- now using pantoprazole.  Mother unsure when last testosterone injection was- she thinks his next scheduled dose would be tomorrow (11/23/18). She will check and discuss with nurse upon her arrival to [facility] today.   Mother states pt takes off scopolamine patch each night so she is wondering if this can be replaced daily. She is hoping to also receive a Rx to have it applied and replaced daily (instead of [id number]).  Mother also notes medication should be delivered to patient via \"side tube,\" not the tube used for tube feeding.     Medication reconciliation/reorder completed by provider prior to medication history? Yes    Time spent in this activity: 30 minutes    Prior to Admission medications    Medication Sig Last Dose Taking? Auth Provider   acetaminophen (TYLENOL) 500 MG tablet 1,000 mg by Oral or FT or NG tube route every 6 hours as needed for mild pain prn at prn Yes Unknown, Entered By History   albuterol (2.5 MG/3ML) 0.083% neb solution Take 1 vial by nebulization every 4 hours as needed for shortness of breath / dyspnea or wheezing prn at prn Yes Unknown, Entered By History   bacitracin ointment Apply topically daily as needed for wound care To PEG site. prn at prn Yes Unknown, Entered By History   Brivaracetam (BRIVIACT) 10 MG/ML solution 100 mg by Oral or FT or NG tube route 2 times daily @0900 and 2100 11/21/2018 at 2100 Yes Reported, Patient   calcium carbonate 1250 (500 CA) MG/5ML SUSP suspension 5 mLs (1,250 mg) by Per J Tube " route 3 times daily (with meals)  0900, 1500, 2100 11/21/2018 at 2100 Yes Mariana Venegas MD   carBAMazepine (TEGRETOL) 100 MG/5ML suspension Take 150 mg by mouth every 6 hours At 06:00, 12:00, 18:00 and 24:00 for seizures 11/22/2018 at 0000 Yes Unknown, Entered By History   hydrocortisone (CORTEF) 5 MG tablet 10 mg by Oral or FT or NG tube route every evening 2 tablets x 5mg=10mg at 1500 11/21/2018 at 1500 Yes Unknown, Entered By History   hydrocortisone (CORTEF) 5 MG tablet 15 mg by Oral or FT or NG tube route every morning 3 tablets x 5mg=15mg 11/21/2018 at AM Yes Unknown, Entered By History   levothyroxine (SYNTHROID/LEVOTHROID) 137 MCG tablet 137 mcg by Oral or FT or NG tube route daily at 0500 11/21/2018 at 0500 Yes Unknown, Entered By History   melatonin (MELATONIN) 1 MG/ML LIQD liquid 6 mg by Jejunal Tube route At Bedtime  11/21/2018 at HS Yes Unknown, Entered By History   Multiple Vitamins-Minerals (CENTRUM SILVER) per tablet Take 1 tablet by mouth daily Crush and feed via j-tube 11/21/2018 at AM Yes Unknown, Entered By History   pantoprazole (PROTONIX) 2 mg/mL SUSP suspension 40 mg by Per NG tube route daily  11/21/2018 at PM Yes Unknown, Entered By History   potassium & sodium phosphates (NEUTRA-PHOS) 280-160-250 MG Packet Take 1 packet by mouth 3 times daily 0900, 1500, 2100.  11/21/2018 at 2100 Yes Unknown, Entered By History   saccharomyces boulardii (FLORASTOR) 250 MG capsule 1 capsule (250 mg) by Oral or Feeding Tube route 2 times daily 11/21/2018 at PM Yes Lakia Beebe PA-C   scopolamine (TRANSDERM-SCOP, 1.5 MG,) 72 hr patch Place 1 patch onto the skin every 72 hours Past Week at Unknown time Yes Reported, Patient   testosterone cypionate (DEPOTESTOTERONE CYPIONATE) 200 MG/ML injection Inject 76 mg into the muscle See Admin Instructions Every 2 weeks.  76 mg or 0.38 mL Past Month at Unknown time Yes Unknown, Entered By History   Vitamin D, Cholecalciferol, 1000 units  TABS Take 2 tablets by mouth every morning 11/21/2018 at AM Yes Unknown, Entered By History   Wheat Dextrin (BENEFIBER PO) Take 2 teaspoonful by mouth daily  11/21/2018 at AM Yes Unknown, Entered By History

## 2018-11-23 LAB
ALBUMIN SERPL-MCNC: 2.8 G/DL (ref 3.4–5)
ALP SERPL-CCNC: 72 U/L (ref 40–150)
ALT SERPL W P-5'-P-CCNC: 29 U/L (ref 0–70)
ANION GAP SERPL CALCULATED.3IONS-SCNC: 6 MMOL/L (ref 3–14)
AST SERPL W P-5'-P-CCNC: 23 U/L (ref 0–45)
BACTERIA SPEC CULT: NO GROWTH
BILIRUB SERPL-MCNC: 0.4 MG/DL (ref 0.2–1.3)
BUN SERPL-MCNC: 11 MG/DL (ref 7–30)
CALCIUM SERPL-MCNC: 8.6 MG/DL (ref 8.5–10.1)
CHLORIDE SERPL-SCNC: 111 MMOL/L (ref 94–109)
CO2 SERPL-SCNC: 28 MMOL/L (ref 20–32)
CREAT SERPL-MCNC: 0.69 MG/DL (ref 0.66–1.25)
ERYTHROCYTE [DISTWIDTH] IN BLOOD BY AUTOMATED COUNT: 14.2 % (ref 10–15)
GFR SERPL CREATININE-BSD FRML MDRD: >90 ML/MIN/1.7M2
GLUCOSE BLDC GLUCOMTR-MCNC: 137 MG/DL (ref 70–99)
GLUCOSE BLDC GLUCOMTR-MCNC: 140 MG/DL (ref 70–99)
GLUCOSE BLDC GLUCOMTR-MCNC: 189 MG/DL (ref 70–99)
GLUCOSE BLDC GLUCOMTR-MCNC: 197 MG/DL (ref 70–99)
GLUCOSE BLDC GLUCOMTR-MCNC: 215 MG/DL (ref 70–99)
GLUCOSE BLDC GLUCOMTR-MCNC: 216 MG/DL (ref 70–99)
GLUCOSE BLDC GLUCOMTR-MCNC: 91 MG/DL (ref 70–99)
GLUCOSE SERPL-MCNC: 179 MG/DL (ref 70–99)
HCT VFR BLD AUTO: 34.2 % (ref 40–53)
HGB BLD-MCNC: 11.4 G/DL (ref 13.3–17.7)
LACTATE BLD-SCNC: 1.9 MMOL/L (ref 0.7–2)
Lab: NORMAL
MCH RBC QN AUTO: 29.6 PG (ref 26.5–33)
MCHC RBC AUTO-ENTMCNC: 33.3 G/DL (ref 31.5–36.5)
MCV RBC AUTO: 89 FL (ref 78–100)
OSMOLALITY UR: 486 MMOL/KG (ref 100–1200)
PLATELET # BLD AUTO: 175 10E9/L (ref 150–450)
POTASSIUM SERPL-SCNC: 3.9 MMOL/L (ref 3.4–5.3)
PROT SERPL-MCNC: 7.2 G/DL (ref 6.8–8.8)
RBC # BLD AUTO: 3.85 10E12/L (ref 4.4–5.9)
SODIUM SERPL-SCNC: 145 MMOL/L (ref 133–144)
SODIUM UR-SCNC: 84 MMOL/L
SPECIMEN SOURCE: NORMAL
VANCOMYCIN SERPL-MCNC: 23.7 MG/L
WBC # BLD AUTO: 14 10E9/L (ref 4–11)

## 2018-11-23 PROCEDURE — 85027 COMPLETE CBC AUTOMATED: CPT | Performed by: INTERNAL MEDICINE

## 2018-11-23 PROCEDURE — 99232 SBSQ HOSP IP/OBS MODERATE 35: CPT | Performed by: INTERNAL MEDICINE

## 2018-11-23 PROCEDURE — 80202 ASSAY OF VANCOMYCIN: CPT | Performed by: INTERNAL MEDICINE

## 2018-11-23 PROCEDURE — 25000132 ZZH RX MED GY IP 250 OP 250 PS 637: Performed by: INTERNAL MEDICINE

## 2018-11-23 PROCEDURE — 80053 COMPREHEN METABOLIC PANEL: CPT | Performed by: INTERNAL MEDICINE

## 2018-11-23 PROCEDURE — 36415 COLL VENOUS BLD VENIPUNCTURE: CPT | Performed by: INTERNAL MEDICINE

## 2018-11-23 PROCEDURE — 83605 ASSAY OF LACTIC ACID: CPT | Performed by: NURSE PRACTITIONER

## 2018-11-23 PROCEDURE — A9270 NON-COVERED ITEM OR SERVICE: HCPCS | Performed by: INTERNAL MEDICINE

## 2018-11-23 PROCEDURE — 25000125 ZZHC RX 250: Performed by: INTERNAL MEDICINE

## 2018-11-23 PROCEDURE — 12000000 ZZH R&B MED SURG/OB

## 2018-11-23 PROCEDURE — 00000146 ZZHCL STATISTIC GLUCOSE BY METER IP

## 2018-11-23 PROCEDURE — 25000128 H RX IP 250 OP 636: Performed by: EMERGENCY MEDICINE

## 2018-11-23 PROCEDURE — G0463 HOSPITAL OUTPT CLINIC VISIT: HCPCS

## 2018-11-23 PROCEDURE — 83935 ASSAY OF URINE OSMOLALITY: CPT | Performed by: INTERNAL MEDICINE

## 2018-11-23 PROCEDURE — 25000128 H RX IP 250 OP 636: Performed by: INTERNAL MEDICINE

## 2018-11-23 PROCEDURE — 84300 ASSAY OF URINE SODIUM: CPT | Performed by: INTERNAL MEDICINE

## 2018-11-23 PROCEDURE — 27210429 ZZH NUTRITION PRODUCT INTERMEDIATE LITER

## 2018-11-23 RX ORDER — VANCOMYCIN HYDROCHLORIDE 1 G/200ML
1000 INJECTION, SOLUTION INTRAVENOUS EVERY 12 HOURS
Status: DISCONTINUED | OUTPATIENT
Start: 2018-11-23 | End: 2018-11-23

## 2018-11-23 RX ORDER — POLYETHYLENE GLYCOL 3350 17 G/17G
17 POWDER, FOR SOLUTION ORAL DAILY PRN
Status: DISCONTINUED | OUTPATIENT
Start: 2018-11-23 | End: 2018-11-25 | Stop reason: HOSPADM

## 2018-11-23 RX ADMIN — CARBAMAZEPINE 150 MG: 100 SUSPENSION ORAL at 00:14

## 2018-11-23 RX ADMIN — Medication 250 MG: at 09:10

## 2018-11-23 RX ADMIN — HYDROCORTISONE SODIUM SUCCINATE 50 MG: 100 INJECTION, POWDER, FOR SOLUTION INTRAMUSCULAR; INTRAVENOUS at 15:39

## 2018-11-23 RX ADMIN — LEVOFLOXACIN 750 MG: 5 INJECTION, SOLUTION INTRAVENOUS at 00:06

## 2018-11-23 RX ADMIN — Medication 250 MG: at 22:19

## 2018-11-23 RX ADMIN — BRIVARACETAM 100 MG: 10 SOLUTION ORAL at 22:42

## 2018-11-23 RX ADMIN — INSULIN ASPART 1 UNITS: 100 INJECTION, SOLUTION INTRAVENOUS; SUBCUTANEOUS at 11:40

## 2018-11-23 RX ADMIN — CEFEPIME HYDROCHLORIDE 2 G: 2 INJECTION, POWDER, FOR SOLUTION INTRAVENOUS at 02:19

## 2018-11-23 RX ADMIN — LEVOTHYROXINE SODIUM 137 MCG: 112 TABLET ORAL at 09:09

## 2018-11-23 RX ADMIN — Medication 1 PACKET: at 15:38

## 2018-11-23 RX ADMIN — POTASSIUM & SODIUM PHOSPHATES POWDER PACK 280-160-250 MG 1 PACKET: 280-160-250 PACK at 22:38

## 2018-11-23 RX ADMIN — INSULIN ASPART 2 UNITS: 100 INJECTION, SOLUTION INTRAVENOUS; SUBCUTANEOUS at 18:28

## 2018-11-23 RX ADMIN — VANCOMYCIN HYDROCHLORIDE 1000 MG: 1 INJECTION, SOLUTION INTRAVENOUS at 02:54

## 2018-11-23 RX ADMIN — CALCIUM CARBONATE 1250 MG: 1250 SUSPENSION ORAL at 09:15

## 2018-11-23 RX ADMIN — PANTOPRAZOLE SODIUM 40 MG: 40 TABLET, DELAYED RELEASE ORAL at 22:40

## 2018-11-23 RX ADMIN — POTASSIUM & SODIUM PHOSPHATES POWDER PACK 280-160-250 MG 1 PACKET: 280-160-250 PACK at 15:38

## 2018-11-23 RX ADMIN — CARBAMAZEPINE 150 MG: 100 SUSPENSION ORAL at 11:40

## 2018-11-23 RX ADMIN — MELATONIN 6 MG: 3 TAB ORAL at 22:19

## 2018-11-23 RX ADMIN — CALCIUM CARBONATE 1250 MG: 1250 SUSPENSION ORAL at 18:27

## 2018-11-23 RX ADMIN — CARBAMAZEPINE 150 MG: 100 SUSPENSION ORAL at 06:05

## 2018-11-23 RX ADMIN — INSULIN ASPART 2 UNITS: 100 INJECTION, SOLUTION INTRAVENOUS; SUBCUTANEOUS at 14:56

## 2018-11-23 RX ADMIN — SODIUM CHLORIDE, POTASSIUM CHLORIDE, SODIUM LACTATE AND CALCIUM CHLORIDE: 600; 310; 30; 20 INJECTION, SOLUTION INTRAVENOUS at 02:21

## 2018-11-23 RX ADMIN — POTASSIUM & SODIUM PHOSPHATES POWDER PACK 280-160-250 MG 1 PACKET: 280-160-250 PACK at 09:08

## 2018-11-23 RX ADMIN — HYDROCORTISONE SODIUM SUCCINATE 100 MG: 100 INJECTION, POWDER, FOR SOLUTION INTRAMUSCULAR; INTRAVENOUS at 00:11

## 2018-11-23 RX ADMIN — SCOPALAMINE 1 PATCH: 1 PATCH, EXTENDED RELEASE TRANSDERMAL at 22:49

## 2018-11-23 RX ADMIN — CEFEPIME HYDROCHLORIDE 2 G: 2 INJECTION, POWDER, FOR SOLUTION INTRAVENOUS at 12:00

## 2018-11-23 RX ADMIN — BRIVARACETAM 100 MG: 10 SOLUTION ORAL at 09:08

## 2018-11-23 RX ADMIN — CARBAMAZEPINE 150 MG: 100 SUSPENSION ORAL at 18:27

## 2018-11-23 RX ADMIN — HYDROCORTISONE SODIUM SUCCINATE 50 MG: 100 INJECTION, POWDER, FOR SOLUTION INTRAMUSCULAR; INTRAVENOUS at 09:09

## 2018-11-23 RX ADMIN — INSULIN ASPART 1 UNITS: 100 INJECTION, SOLUTION INTRAVENOUS; SUBCUTANEOUS at 02:55

## 2018-11-23 RX ADMIN — SCOPALAMINE 1 PATCH: 1 PATCH, EXTENDED RELEASE TRANSDERMAL at 15:46

## 2018-11-23 RX ADMIN — CALCIUM CARBONATE 1250 MG: 1250 SUSPENSION ORAL at 11:40

## 2018-11-23 ASSESSMENT — ACTIVITIES OF DAILY LIVING (ADL)
ADLS_ACUITY_SCORE: 45

## 2018-11-23 NOTE — PHARMACY-VANCOMYCIN DOSING SERVICE
Pharmacy Vancomycin Note  Date of Service 2018  Patient's  1960   58 year old, male    Indication: Healthcare-Associated Pneumonia  Goal Trough Level: 15-20 mg/L  Day of Therapy: 2  Current Vancomycin regimen:  1000 mg IV q8h    Current estimated CrCl = Estimated Creatinine Clearance: 122.1 mL/min (based on Cr of 0.71).    Creatinine for last 3 days  2018:  1:20 AM Creatinine 0.72 mg/dL;  5:48 AM Creatinine 0.72 mg/dL; 10:43 AM Creatinine 0.71 mg/dL    Recent Vancomycin Levels (past 3 days)  2018:  2:00 AM Vancomycin Level 23.7 mg/L    Vancomycin IV Administrations (past 72 hours)                   vancomycin (VANCOCIN) 1000 mg in dextrose 5% 200 mL PREMIX (mg) 1,000 mg New Bag 18 0254     1,000 mg New Bag 18 1838     1,000 mg New Bag  1141    vancomycin (VANCOCIN) 1,750 mg in sodium chloride 0.9 % 500 mL intermittent infusion (mg) 1,750 mg New Bag 18 0309                Nephrotoxins and other renal medications (Future)    Start     Dose/Rate Route Frequency Ordered Stop    18 1800  vancomycin (VANCOCIN) 1000 mg in dextrose 5% 200 mL PREMIX      1,000 mg  200 mL/hr over 1 Hours Intravenous EVERY 12 HOURS 18 0745               Contrast Orders - past 72 hours     None          Interpretation of levels and current regimen:  Trough level is  Supratherapeutic    Has serum creatinine changed > 50% in last 72 hours: No      Renal Function: Stable    Plan:  1.  Decrease Dose to 1000mg IV Q12hr   2.  Pharmacy will check trough levels as appropriate in 1-3 Days.    3. Serum creatinine levels will be ordered daily for the first week of therapy and at least twice weekly for subsequent weeks.      Alicia Becerra        .
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
## 2018-11-23 NOTE — PLAN OF CARE
Problem: Patient Care Overview  Goal: Plan of Care/Patient Progress Review  Outcome: No Change  Alert, non-verbal.  Calm and cooperative for cares.  Strict NPO.  Tube feed to begin at 0700 and run @100 mL/hr until 1900.  VSS on room air.   and 137.  LS diminished. No signs of pain.  Condom catheter in place with 400 mL urine output.  Mepilex on coccyx CDI.  Turn/repo q 2 hrs.  Tele NSR. PIV with IVF infusing @100.  IV abx.  Continue to monitor.
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
## 2018-11-23 NOTE — PLAN OF CARE
Problem: Patient Care Overview  Goal: Plan of Care/Patient Progress Review  Outcome: No Change  Pt A/O to self, non verbal. Lift, fall risk. VSS on RA. Lung sounds diminished with infrequent nonproductive cough. Lactic acid 3.0. Plan for recheck in AM. IVF infusing. IV antibiotics. TF infusing at 100/hr. Will be complete around 0000. Pt incontinent of bowel and bladder. Condom cath in place. Reposition q2h. Discharge pending progress.

## 2018-11-23 NOTE — PROGRESS NOTES
House [facility] brief note:    Was paged by nursing regarding repeat lactic acid of 3.0.  Pt has received appropriate fluid resuscitation 30 ml/hr throughout the day, 3L total.  Pt's lactic acid has been unresponsive to boluses; however, pt's VS have improved.  Pt's lactic acid historically elevated.  Pt is now afebrile, HR 90s, BP 110s/60s, RR 10s, and O2 sats >92% on RA.  Pt non-toxic appearing, lying in bed, alert, watching TV upon arrival.  Pt's physical exam notable for coarse lung sounds, distant S1S2, hypoactive bowel sounds, nontender abdomen, TF infusing via PEG, IVF infusing at 100 ml/hr, no peripheral edema noted.  Pt remains on appropriate antiobiotics.  Given improvement in overall clinical status, will defer further fluid boluses at this time.  If pt begins to note signs of decompensation, will bolus at that time.  Will continue to monitor and repeat lactic acid in AM.    NATHANIEL Phoenix, CNP  [facility]    No charge.

## 2018-11-23 NOTE — PLAN OF CARE
Problem: Patient Care Overview  Goal: Plan of Care/Patient Progress Review  Outcome: Improving  Pt has been resting quietly, smiles frequently, uncooperative with certain cares, would not let us wash his face or brush his teeth. Very strong grasp with left hand, does not move right arm, can move legs some. Heart rate tachy at times. VSS. Tolerating tube feeding, no stool yet today. Coccyx reddened but no open area, barrier cream applied and left open to air. Pt transfers with a lift.

## 2018-11-24 LAB
ANION GAP SERPL CALCULATED.3IONS-SCNC: 7 MMOL/L (ref 3–14)
BUN SERPL-MCNC: 15 MG/DL (ref 7–30)
CALCIUM SERPL-MCNC: 8.4 MG/DL (ref 8.5–10.1)
CHLORIDE SERPL-SCNC: 106 MMOL/L (ref 94–109)
CO2 SERPL-SCNC: 28 MMOL/L (ref 20–32)
CREAT SERPL-MCNC: 0.65 MG/DL (ref 0.66–1.25)
ERYTHROCYTE [DISTWIDTH] IN BLOOD BY AUTOMATED COUNT: 14.2 % (ref 10–15)
GFR SERPL CREATININE-BSD FRML MDRD: >90 ML/MIN/1.7M2
GLUCOSE BLDC GLUCOMTR-MCNC: 112 MG/DL (ref 70–99)
GLUCOSE BLDC GLUCOMTR-MCNC: 152 MG/DL (ref 70–99)
GLUCOSE BLDC GLUCOMTR-MCNC: 174 MG/DL (ref 70–99)
GLUCOSE BLDC GLUCOMTR-MCNC: 185 MG/DL (ref 70–99)
GLUCOSE BLDC GLUCOMTR-MCNC: 191 MG/DL (ref 70–99)
GLUCOSE BLDC GLUCOMTR-MCNC: 84 MG/DL (ref 70–99)
GLUCOSE SERPL-MCNC: 167 MG/DL (ref 70–99)
HCT VFR BLD AUTO: 32.1 % (ref 40–53)
HGB BLD-MCNC: 10.7 G/DL (ref 13.3–17.7)
MCH RBC QN AUTO: 29.5 PG (ref 26.5–33)
MCHC RBC AUTO-ENTMCNC: 33.3 G/DL (ref 31.5–36.5)
MCV RBC AUTO: 88 FL (ref 78–100)
PLATELET # BLD AUTO: 166 10E9/L (ref 150–450)
POTASSIUM SERPL-SCNC: 3.8 MMOL/L (ref 3.4–5.3)
RBC # BLD AUTO: 3.63 10E12/L (ref 4.4–5.9)
SODIUM SERPL-SCNC: 141 MMOL/L (ref 133–144)
WBC # BLD AUTO: 9.4 10E9/L (ref 4–11)

## 2018-11-24 PROCEDURE — 00000146 ZZHCL STATISTIC GLUCOSE BY METER IP

## 2018-11-24 PROCEDURE — 25000125 ZZHC RX 250: Performed by: INTERNAL MEDICINE

## 2018-11-24 PROCEDURE — 36415 COLL VENOUS BLD VENIPUNCTURE: CPT | Performed by: INTERNAL MEDICINE

## 2018-11-24 PROCEDURE — A9270 NON-COVERED ITEM OR SERVICE: HCPCS | Performed by: INTERNAL MEDICINE

## 2018-11-24 PROCEDURE — 85027 COMPLETE CBC AUTOMATED: CPT | Performed by: INTERNAL MEDICINE

## 2018-11-24 PROCEDURE — 99232 SBSQ HOSP IP/OBS MODERATE 35: CPT | Performed by: INTERNAL MEDICINE

## 2018-11-24 PROCEDURE — 25000132 ZZH RX MED GY IP 250 OP 250 PS 637: Performed by: INTERNAL MEDICINE

## 2018-11-24 PROCEDURE — 27210429 ZZH NUTRITION PRODUCT INTERMEDIATE LITER

## 2018-11-24 PROCEDURE — 25000128 H RX IP 250 OP 636: Performed by: INTERNAL MEDICINE

## 2018-11-24 PROCEDURE — 80048 BASIC METABOLIC PNL TOTAL CA: CPT | Performed by: INTERNAL MEDICINE

## 2018-11-24 PROCEDURE — 25000128 H RX IP 250 OP 636: Performed by: EMERGENCY MEDICINE

## 2018-11-24 PROCEDURE — 12000000 ZZH R&B MED SURG/OB

## 2018-11-24 RX ORDER — BACITRACIN ZINC 500 [USP'U]/G
OINTMENT TOPICAL DAILY PRN
Status: DISCONTINUED | OUTPATIENT
Start: 2018-11-24 | End: 2018-11-25 | Stop reason: HOSPADM

## 2018-11-24 RX ADMIN — CARBAMAZEPINE 150 MG: 100 SUSPENSION ORAL at 00:13

## 2018-11-24 RX ADMIN — HYDROCORTISONE SODIUM SUCCINATE 50 MG: 100 INJECTION, POWDER, FOR SOLUTION INTRAMUSCULAR; INTRAVENOUS at 00:09

## 2018-11-24 RX ADMIN — CARBAMAZEPINE 150 MG: 100 SUSPENSION ORAL at 12:35

## 2018-11-24 RX ADMIN — HYDROCORTISONE SODIUM SUCCINATE 50 MG: 100 INJECTION, POWDER, FOR SOLUTION INTRAMUSCULAR; INTRAVENOUS at 09:18

## 2018-11-24 RX ADMIN — SCOPALAMINE 1 PATCH: 1 PATCH, EXTENDED RELEASE TRANSDERMAL at 15:23

## 2018-11-24 RX ADMIN — BRIVARACETAM 100 MG: 10 SOLUTION ORAL at 09:31

## 2018-11-24 RX ADMIN — LEVOTHYROXINE SODIUM 137 MCG: 112 TABLET ORAL at 09:18

## 2018-11-24 RX ADMIN — POTASSIUM & SODIUM PHOSPHATES POWDER PACK 280-160-250 MG 1 PACKET: 280-160-250 PACK at 09:18

## 2018-11-24 RX ADMIN — HYDROCORTISONE SODIUM SUCCINATE 50 MG: 100 INJECTION, POWDER, FOR SOLUTION INTRAMUSCULAR; INTRAVENOUS at 20:51

## 2018-11-24 RX ADMIN — Medication 250 MG: at 09:18

## 2018-11-24 RX ADMIN — CALCIUM CARBONATE 1250 MG: 1250 SUSPENSION ORAL at 09:18

## 2018-11-24 RX ADMIN — CEFEPIME HYDROCHLORIDE 2 G: 2 INJECTION, POWDER, FOR SOLUTION INTRAVENOUS at 12:35

## 2018-11-24 RX ADMIN — INSULIN ASPART 1 UNITS: 100 INJECTION, SOLUTION INTRAVENOUS; SUBCUTANEOUS at 02:29

## 2018-11-24 RX ADMIN — CARBAMAZEPINE 150 MG: 100 SUSPENSION ORAL at 17:53

## 2018-11-24 RX ADMIN — Medication 1 PACKET: at 09:18

## 2018-11-24 RX ADMIN — CARBAMAZEPINE 150 MG: 100 SUSPENSION ORAL at 06:51

## 2018-11-24 RX ADMIN — CALCIUM CARBONATE 1250 MG: 1250 SUSPENSION ORAL at 17:53

## 2018-11-24 RX ADMIN — MELATONIN 6 MG: 3 TAB ORAL at 22:04

## 2018-11-24 RX ADMIN — INSULIN ASPART 1 UNITS: 100 INJECTION, SOLUTION INTRAVENOUS; SUBCUTANEOUS at 10:50

## 2018-11-24 RX ADMIN — Medication 250 MG: at 20:52

## 2018-11-24 RX ADMIN — LEVOFLOXACIN 750 MG: 5 INJECTION, SOLUTION INTRAVENOUS at 01:01

## 2018-11-24 RX ADMIN — CEFEPIME HYDROCHLORIDE 2 G: 2 INJECTION, POWDER, FOR SOLUTION INTRAVENOUS at 00:09

## 2018-11-24 RX ADMIN — PANTOPRAZOLE SODIUM 40 MG: 40 TABLET, DELAYED RELEASE ORAL at 22:04

## 2018-11-24 RX ADMIN — BRIVARACETAM 100 MG: 10 SOLUTION ORAL at 20:59

## 2018-11-24 RX ADMIN — INSULIN ASPART 2 UNITS: 100 INJECTION, SOLUTION INTRAVENOUS; SUBCUTANEOUS at 18:44

## 2018-11-24 RX ADMIN — CALCIUM CARBONATE 1250 MG: 1250 SUSPENSION ORAL at 12:35

## 2018-11-24 RX ADMIN — POTASSIUM & SODIUM PHOSPHATES POWDER PACK 280-160-250 MG 1 PACKET: 280-160-250 PACK at 14:51

## 2018-11-24 RX ADMIN — POTASSIUM & SODIUM PHOSPHATES POWDER PACK 280-160-250 MG 1 PACKET: 280-160-250 PACK at 20:52

## 2018-11-24 RX ADMIN — INSULIN ASPART 1 UNITS: 100 INJECTION, SOLUTION INTRAVENOUS; SUBCUTANEOUS at 14:20

## 2018-11-24 ASSESSMENT — ACTIVITIES OF DAILY LIVING (ADL)
ADLS_ACUITY_SCORE: 43
ADLS_ACUITY_SCORE: 45
ADLS_ACUITY_SCORE: 43
ADLS_ACUITY_SCORE: 43

## 2018-11-24 NOTE — PLAN OF CARE
Problem: Patient Care Overview  Goal: Plan of Care/Patient Progress Review  Outcome: No Change  HECTOR orientation; nonverbal. VSS on RA. Denies pain. Tele NSR.  & 185. Incontinent of b/b; external male catheter in place, voiding adequately. NPO; TF infusing through G tube. T/R q2h, up with lift assist. LS diminished. IV SL; continues on IV abx. Attempted oral cares, pt refused. Coccyx red, barrier cream applied. Contact precautions maintained. Nursing will continue to monitor.
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
## 2018-11-24 NOTE — PLAN OF CARE
Problem: Patient Care Overview  Goal: Plan of Care/Patient Progress Review  Outcome: Improving  Pt nonverbal. Alert, smiling frequently. Lift, fall risk. VSS on RA. No signs of pain. External male catheter applied. Reposition q2h. TF from 5656-1135. Gtube. Red coccyx. IV antibiotics. Discharge pending progress.

## 2018-11-25 VITALS
HEIGHT: 72 IN | BODY MASS INDEX: 21.41 KG/M2 | SYSTOLIC BLOOD PRESSURE: 151 MMHG | DIASTOLIC BLOOD PRESSURE: 82 MMHG | WEIGHT: 158.07 LBS | RESPIRATION RATE: 20 BRPM | TEMPERATURE: 97.4 F | OXYGEN SATURATION: 98 %

## 2018-11-25 LAB
CREAT SERPL-MCNC: 0.69 MG/DL (ref 0.66–1.25)
GFR SERPL CREATININE-BSD FRML MDRD: >90 ML/MIN/1.7M2
GLUCOSE BLDC GLUCOMTR-MCNC: 103 MG/DL (ref 70–99)
GLUCOSE BLDC GLUCOMTR-MCNC: 116 MG/DL (ref 70–99)
GLUCOSE BLDC GLUCOMTR-MCNC: 136 MG/DL (ref 70–99)
GLUCOSE BLDC GLUCOMTR-MCNC: 155 MG/DL (ref 70–99)
PLATELET # BLD AUTO: 156 10E9/L (ref 150–450)

## 2018-11-25 PROCEDURE — A9270 NON-COVERED ITEM OR SERVICE: HCPCS | Performed by: INTERNAL MEDICINE

## 2018-11-25 PROCEDURE — 25000128 H RX IP 250 OP 636: Performed by: EMERGENCY MEDICINE

## 2018-11-25 PROCEDURE — 82565 ASSAY OF CREATININE: CPT | Performed by: INTERNAL MEDICINE

## 2018-11-25 PROCEDURE — 36415 COLL VENOUS BLD VENIPUNCTURE: CPT | Performed by: INTERNAL MEDICINE

## 2018-11-25 PROCEDURE — 99239 HOSP IP/OBS DSCHRG MGMT >30: CPT | Performed by: INTERNAL MEDICINE

## 2018-11-25 PROCEDURE — 00000146 ZZHCL STATISTIC GLUCOSE BY METER IP

## 2018-11-25 PROCEDURE — 85049 AUTOMATED PLATELET COUNT: CPT | Performed by: INTERNAL MEDICINE

## 2018-11-25 PROCEDURE — 25000132 ZZH RX MED GY IP 250 OP 250 PS 637: Performed by: INTERNAL MEDICINE

## 2018-11-25 RX ORDER — AMOXICILLIN AND CLAVULANATE POTASSIUM 500; 125 MG/1; MG/1
1 TABLET, FILM COATED ORAL 3 TIMES DAILY
Qty: 18 TABLET | Refills: 0 | Status: SHIPPED | OUTPATIENT
Start: 2018-11-25 | End: 2018-11-25

## 2018-11-25 RX ORDER — AMOXICILLIN AND CLAVULANATE POTASSIUM 500; 125 MG/1; MG/1
1 TABLET, FILM COATED ORAL 3 TIMES DAILY
Qty: 18 TABLET | Refills: 0 | Status: ON HOLD | OUTPATIENT
Start: 2018-11-25 | End: 2018-12-24

## 2018-11-25 RX ORDER — NYSTATIN 100000/ML
500000 SUSPENSION, ORAL (FINAL DOSE FORM) ORAL 4 TIMES DAILY
Qty: 200 ML | Refills: 0 | Status: SHIPPED | OUTPATIENT
Start: 2018-11-25 | End: 2019-01-31

## 2018-11-25 RX ORDER — HYDROCORTISONE 10 MG/1
10 TABLET ORAL EVERY EVENING
Status: DISCONTINUED | OUTPATIENT
Start: 2018-11-25 | End: 2018-11-25 | Stop reason: HOSPADM

## 2018-11-25 RX ORDER — NYSTATIN 100000/ML
500000 SUSPENSION, ORAL (FINAL DOSE FORM) ORAL 4 TIMES DAILY
Status: DISCONTINUED | OUTPATIENT
Start: 2018-11-25 | End: 2018-11-25 | Stop reason: HOSPADM

## 2018-11-25 RX ADMIN — Medication 250 MG: at 08:21

## 2018-11-25 RX ADMIN — CARBAMAZEPINE 150 MG: 100 SUSPENSION ORAL at 00:48

## 2018-11-25 RX ADMIN — HYDROCORTISONE 15 MG: 5 TABLET ORAL at 09:55

## 2018-11-25 RX ADMIN — POTASSIUM & SODIUM PHOSPHATES POWDER PACK 280-160-250 MG 1 PACKET: 280-160-250 PACK at 08:20

## 2018-11-25 RX ADMIN — CEFEPIME HYDROCHLORIDE 2 G: 2 INJECTION, POWDER, FOR SOLUTION INTRAVENOUS at 11:10

## 2018-11-25 RX ADMIN — CARBAMAZEPINE 150 MG: 100 SUSPENSION ORAL at 11:10

## 2018-11-25 RX ADMIN — CARBAMAZEPINE 150 MG: 100 SUSPENSION ORAL at 06:19

## 2018-11-25 RX ADMIN — POTASSIUM & SODIUM PHOSPHATES POWDER PACK 280-160-250 MG 1 PACKET: 280-160-250 PACK at 14:43

## 2018-11-25 RX ADMIN — INSULIN ASPART 1 UNITS: 100 INJECTION, SOLUTION INTRAVENOUS; SUBCUTANEOUS at 13:36

## 2018-11-25 RX ADMIN — NYSTATIN 500000 UNITS: 100000 SUSPENSION ORAL at 13:09

## 2018-11-25 RX ADMIN — CEFEPIME HYDROCHLORIDE 2 G: 2 INJECTION, POWDER, FOR SOLUTION INTRAVENOUS at 00:48

## 2018-11-25 RX ADMIN — LEVOTHYROXINE SODIUM 137 MCG: 112 TABLET ORAL at 08:20

## 2018-11-25 RX ADMIN — CALCIUM CARBONATE 1250 MG: 1250 SUSPENSION ORAL at 11:10

## 2018-11-25 RX ADMIN — Medication 1 PACKET: at 08:20

## 2018-11-25 RX ADMIN — BRIVARACETAM 100 MG: 10 SOLUTION ORAL at 09:55

## 2018-11-25 RX ADMIN — CALCIUM CARBONATE 1250 MG: 1250 SUSPENSION ORAL at 08:20

## 2018-11-25 ASSESSMENT — ACTIVITIES OF DAILY LIVING (ADL)
ADLS_ACUITY_SCORE: 43

## 2018-11-25 NOTE — PLAN OF CARE
Problem: Patient Care Overview  Goal: Plan of Care/Patient Progress Review  Outcome: No Change  HECTOR orientation; nonverbal. VSS on RA. Denies pain. Tele sinus hector.  & 156. Incontinent of b/b; voiding adequately. NPO; TF infusing through G tube. T/R q2h, up with lift assist. LS diminished. Attempted oral cares, pt refused. Coccyx red, barrier cream applied. Contact precautions maintained. Nursing will continue to monitor. Plan for discharge to home today, transportation to be provided by [facility] at 1545.

## 2018-11-25 NOTE — PLAN OF CARE
Problem: Patient Care Overview  Goal: Plan of Care/Patient Progress Review  Outcome: Improving  Pt. Nonverbal. BGM q4hrs. Tele Sinus hector. VSS on 2L. Declined oral care. Attempted to hit out at staff during cares, was redirected. Incontinent of bowel and bladder.Total care with assist of 2; T&R q 2hr. Up with lift. Contact isolation maintained. Discharge pending.
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
## 2018-11-25 NOTE — PROGRESS NOTES
SW:     I: SW following for discharge planning. Pt is medically appropriate for discharge today, 11.25.18. Pt will be discharged to home with resumption of community service plan including PCA and SN visits.  Pt will be transported by [facility] which is scheduled for 1545. SW discussed plan with pt's guardian/mother who is agreeable and denies any additional questions or concerns at this time.      P: Pt to discharge to home w/ resumption of community services via [facility] wheelchair transport at 1545. SW will be available to patient and staff if needed.    BAYRON Snyder

## 2018-12-02 ENCOUNTER — HOSPITAL ENCOUNTER (EMERGENCY)
Facility: CLINIC | Age: 58
Discharge: HOME | End: 2018-12-03
Attending: EMERGENCY MEDICINE | Admitting: EMERGENCY MEDICINE
Payer: MEDICARE

## 2018-12-02 DIAGNOSIS — R41.82 ALTERED MENTAL STATUS, UNSPECIFIED ALTERED MENTAL STATUS TYPE: ICD-10-CM

## 2018-12-02 LAB
ALBUMIN UR-MCNC: 10 MG/DL
ANION GAP SERPL CALCULATED.3IONS-SCNC: 5 MMOL/L (ref 3–14)
APPEARANCE UR: ABNORMAL
BASOPHILS # BLD AUTO: 0.1 10E9/L (ref 0–0.2)
BASOPHILS NFR BLD AUTO: 0.5 %
BILIRUB UR QL STRIP: NEGATIVE
BUN SERPL-MCNC: 19 MG/DL (ref 7–30)
CALCIUM SERPL-MCNC: 8.5 MG/DL (ref 8.5–10.1)
CARBAMAZEPINE SERPL-MCNC: 14.2 MG/L (ref 4–12)
CHLORIDE SERPL-SCNC: 96 MMOL/L (ref 94–109)
CO2 SERPL-SCNC: 30 MMOL/L (ref 20–32)
COLOR UR AUTO: YELLOW
CREAT SERPL-MCNC: 0.72 MG/DL (ref 0.66–1.25)
DIFFERENTIAL METHOD BLD: ABNORMAL
EOSINOPHIL # BLD AUTO: 0.3 10E9/L (ref 0–0.7)
EOSINOPHIL NFR BLD AUTO: 3 %
ERYTHROCYTE [DISTWIDTH] IN BLOOD BY AUTOMATED COUNT: 13.3 % (ref 10–15)
GFR SERPL CREATININE-BSD FRML MDRD: >90 ML/MIN/1.7M2
GLUCOSE SERPL-MCNC: 108 MG/DL (ref 70–99)
GLUCOSE UR STRIP-MCNC: NEGATIVE MG/DL
HCT VFR BLD AUTO: 36.1 % (ref 40–53)
HGB BLD-MCNC: 12.5 G/DL (ref 13.3–17.7)
HGB UR QL STRIP: NEGATIVE
IMM GRANULOCYTES # BLD: 0 10E9/L (ref 0–0.4)
IMM GRANULOCYTES NFR BLD: 0.3 %
KETONES UR STRIP-MCNC: NEGATIVE MG/DL
LEUKOCYTE ESTERASE UR QL STRIP: NEGATIVE
LYMPHOCYTES # BLD AUTO: 3.3 10E9/L (ref 0.8–5.3)
LYMPHOCYTES NFR BLD AUTO: 31.1 %
MCH RBC QN AUTO: 29.4 PG (ref 26.5–33)
MCHC RBC AUTO-ENTMCNC: 34.6 G/DL (ref 31.5–36.5)
MCV RBC AUTO: 85 FL (ref 78–100)
MONOCYTES # BLD AUTO: 0.7 10E9/L (ref 0–1.3)
MONOCYTES NFR BLD AUTO: 7 %
NEUTROPHILS # BLD AUTO: 6.2 10E9/L (ref 1.6–8.3)
NEUTROPHILS NFR BLD AUTO: 58.1 %
NITRATE UR QL: NEGATIVE
NRBC # BLD AUTO: 0 10*3/UL
NRBC BLD AUTO-RTO: 0 /100
PH UR STRIP: 8 PH (ref 5–7)
PLATELET # BLD AUTO: 157 10E9/L (ref 150–450)
POTASSIUM SERPL-SCNC: 4.4 MMOL/L (ref 3.4–5.3)
RBC # BLD AUTO: 4.25 10E12/L (ref 4.4–5.9)
RBC #/AREA URNS AUTO: 1 /HPF (ref 0–2)
SODIUM SERPL-SCNC: 131 MMOL/L (ref 133–144)
SOURCE: ABNORMAL
SP GR UR STRIP: 1.01 (ref 1–1.03)
SQUAMOUS #/AREA URNS AUTO: <1 /HPF (ref 0–1)
UROBILINOGEN UR STRIP-MCNC: NORMAL MG/DL (ref 0–2)
WBC # BLD AUTO: 10.6 10E9/L (ref 4–11)
WBC #/AREA URNS AUTO: 1 /HPF (ref 0–5)

## 2018-12-02 PROCEDURE — 81001 URINALYSIS AUTO W/SCOPE: CPT | Performed by: EMERGENCY MEDICINE

## 2018-12-02 PROCEDURE — 80156 ASSAY CARBAMAZEPINE TOTAL: CPT | Performed by: EMERGENCY MEDICINE

## 2018-12-02 PROCEDURE — 80048 BASIC METABOLIC PNL TOTAL CA: CPT | Performed by: EMERGENCY MEDICINE

## 2018-12-02 PROCEDURE — 85025 COMPLETE CBC W/AUTO DIFF WBC: CPT | Performed by: EMERGENCY MEDICINE

## 2018-12-02 PROCEDURE — 99283 EMERGENCY DEPT VISIT LOW MDM: CPT

## 2018-12-02 ASSESSMENT — ENCOUNTER SYMPTOMS: SEIZURES: 1

## 2018-12-02 NOTE — ED AVS SNAPSHOT
Emergency Department    [address] [facility] [address]    Phone:  [phone]    Fax:  [phone]                                       Keyon Farias   MRN: [id number]    Department:   Emergency Department   Date of Visit:  12/2/2018           Patient Information     Date Of Birth          1960        Your diagnoses for this visit were:     Altered mental status, unspecified altered mental status type        You were seen by Mireille Kincaid MD.      Follow-up Information     Follow up with Carlos Gomez MD.    Why:  As needed    Contact information:    [address] [address] [phone]          Discharge Instructions         The Tegretol level was 14.  Call your neurologist if you have concerns that this is not the level that they want him out as it is slightly high.  Return if Keyon has a fever or worsening status.  Otherwise call your doctor about any other concerns.      24 Hour Appointment Hotline       To make an appointment at any [facility], call [phone] ([phone]). If you don't have a family doctor or clinic, we will help you find one. [facility] clinics are conveniently located to serve the needs of you and your family.             Review of your medicines      Our records show that you are taking the medicines listed below. If these are incorrect, please call your family doctor or clinic.        Dose / Directions Last dose taken    acetaminophen 500 MG tablet   Commonly known as:  TYLENOL   Dose:  1000 mg        1,000 mg by Oral or FT or NG tube route every 6 hours as needed for mild pain   Refills:  0        albuterol (2.5 MG/3ML) 0.083% neb solution   Commonly known as:  PROVENTIL   Dose:  1 vial        Take 1 vial by nebulization every 4 hours as needed for shortness of breath / dyspnea or wheezing   Refills:  0        amoxicillin-clavulanate 500-125 MG tablet   Commonly known as:  AUGMENTIN   Dose:  1 tablet    Quantity:  18 tablet        1 tablet by Per G Tube route 3 times daily   Refills:  0        bacitracin 500 UNIT/GM external ointment        Apply topically daily as needed for wound care To PEG site.   Refills:  0        BENEFIBER PO   Dose:  2 teaspoonful        Take 2 teaspoonful by mouth daily   Refills:  0        BRIVIACT 10 MG/ML solution   Dose:  100 mg   Generic drug:  Brivaracetam        100 mg by Oral or FT or NG tube route 2 times daily @0900 and 2100   Refills:  0        calcium carbonate 1250 (500 Ca) MG/5ML Susp suspension   Dose:  1250 mg   Quantity:  450 mL        5 mLs (1,250 mg) by Per J Tube route 3 times daily (with meals)   Refills:  0        carBAMazepine 100 MG/5ML suspension   Commonly known as:  TEGretol   Dose:  150 mg        Take 150 mg by mouth every 6 hours At 06:00, 12:00, 18:00 and 24:00 for seizures   Refills:  0        * hydrocortisone 5 MG tablet   Commonly known as:  CORTEF   Dose:  10 mg        10 mg by Oral or FT or NG tube route every evening 2 tablets x 5mg=10mg   Refills:  0        * hydrocortisone 5 MG tablet   Commonly known as:  CORTEF   Dose:  15 mg        15 mg by Oral or FT or NG tube route every morning 3 tablets x 5mg=15mg   Refills:  0        levothyroxine 137 MCG tablet   Commonly known as:  SYNTHROID/LEVOTHROID   Dose:  137 mcg        137 mcg by Oral or FT or NG tube route daily   Refills:  0        melatonin 1 MG/ML Liqd liquid   Dose:  6 mg        6 mg by Jejunal Tube route At Bedtime   Refills:  0        multivitamin tablet   Dose:  1 tablet        Take 1 tablet by mouth daily Crush and feed via j-tube   Refills:  0        nystatin [id number] UNIT/ML suspension   Commonly known as:  MYCOSTATIN   Dose:  [id number] Units   Quantity:  200 mL        Swish and spit 5 mLs (500,000 Units) in mouth 4 times daily for 10 days   Refills:  0        pantoprazole 2 mg/mL Susp suspension   Commonly known as:  PROTONIX   Dose:  40 mg        40 mg by Per NG tube route daily   Refills:   0        potassium & sodium phosphates 280-160-250 MG Packet   Commonly known as:  NEUTRA-PHOS   Dose:  1 packet        Take 1 packet by mouth 3 times daily 0900, 1500, 2100.   Refills:  0        saccharomyces boulardii 250 MG capsule   Commonly known as:  FLORASTOR   Dose:  250 mg   Quantity:  60 capsule        1 capsule (250 mg) by Oral or Feeding Tube route 2 times daily   Refills:  0        testosterone cypionate 200 MG/ML injection   Commonly known as:  DEPOTESTOSTERONE   Dose:  76 mg        Inject 76 mg into the muscle See Admin Instructions Every 2 weeks. 76 mg or 0.38 mL   Refills:  0        TRANSDERM-SCOP (1.5 MG) 1 MG/3DAYS 72 hr patch   Dose:  1 patch   Indication:  Increased Production of Saliva   Generic drug:  scopolamine        Place 1 patch onto the skin every 72 hours   Refills:  0        Vitamin D (Cholecalciferol) 1000 units Tabs   Dose:  2 tablet        Take 2 tablets by mouth every morning   Refills:  0        * Notice:  This list has 2 medication(s) that are the same as other medications prescribed for you. Read the directions carefully, and ask your doctor or other care provider to review them with you.            Procedures and tests performed during your visit     Basic metabolic panel    CBC with platelets differential    Carbamazepine total    UA reflex to Microscopic and Culture      Orders Needing Specimen Collection     None      Pending Results     No orders found from 11/30/2018 to 12/3/2018.            Pending Culture Results     No orders found from 11/30/2018 to 12/3/2018.            Pending Results Instructions     If you had any lab results that were not finalized at the time of your Discharge, you can call the ED Lab Result RN at [phone]. You will be contacted by this team for any positive Lab results or changes in treatment. The nurses are available 7 days a week from 10A to 6:30P.  You can leave a message 24 hours per day and they will return your call.        Test  Results From Your Hospital Stay        12/2/2018 10:39 PM      Component Results     Component Value Ref Range & Units Status    WBC 10.6 4.0 - 11.0 10e9/L Final    RBC Count 4.25 (L) 4.4 - 5.9 10e12/L Final    Hemoglobin 12.5 (L) 13.3 - 17.7 g/dL Final    Hematocrit 36.1 (L) 40.0 - 53.0 % Final    MCV 85 78 - 100 fl Final    MCH 29.4 26.5 - 33.0 pg Final    MCHC 34.6 31.5 - 36.5 g/dL Final    RDW 13.3 10.0 - 15.0 % Final    Platelet Count 157 150 - 450 10e9/L Final    Diff Method Automated Method  Final    % Neutrophils 58.1 % Final    % Lymphocytes 31.1 % Final    % Monocytes 7.0 % Final    % Eosinophils 3.0 % Final    % Basophils 0.5 % Final    % Immature Granulocytes 0.3 % Final    Nucleated RBCs 0 0 /100 Final    Absolute Neutrophil 6.2 1.6 - 8.3 10e9/L Final    Absolute Lymphocytes 3.3 0.8 - 5.3 10e9/L Final    Absolute Monocytes 0.7 0.0 - 1.3 10e9/L Final    Absolute Eosinophils 0.3 0.0 - 0.7 10e9/L Final    Absolute Basophils 0.1 0.0 - 0.2 10e9/L Final    Abs Immature Granulocytes 0.0 0 - 0.4 10e9/L Final    Absolute Nucleated RBC 0.0  Final         12/2/2018 10:58 PM      Component Results     Component Value Ref Range & Units Status    Sodium 131 (L) 133 - 144 mmol/L Final    Potassium 4.4 3.4 - 5.3 mmol/L Final    Chloride 96 94 - 109 mmol/L Final    Carbon Dioxide 30 20 - 32 mmol/L Final    Anion Gap 5 3 - 14 mmol/L Final    Glucose 108 (H) 70 - 99 mg/dL Final    Urea Nitrogen 19 7 - 30 mg/dL Final    Creatinine 0.72 0.66 - 1.25 mg/dL Final    GFR Estimate >90 >60 mL/min/1.7m2 Final    Non  GFR Calc    GFR Estimate If Black >90 >60 mL/min/1.7m2 Final    African American GFR Calc    Calcium 8.5 8.5 - 10.1 mg/dL Final         12/2/2018 10:58 PM      Component Results     Component Value Ref Range & Units Status    Carbamazepine Level 14.2 (H) 4.0 - 12.0 mg/L Final         12/2/2018 11:33 PM      Component Results     Component Value Ref Range & Units Status    Color Urine Yellow  Final     Appearance Urine Slightly Cloudy  Final    Glucose Urine Negative NEG^Negative mg/dL Final    Bilirubin Urine Negative NEG^Negative Final    Ketones Urine Negative NEG^Negative mg/dL Final    Specific Gravity Urine 1.014 1.003 - 1.035 Final    Blood Urine Negative NEG^Negative Final    pH Urine 8.0 (H) 5.0 - 7.0 pH Final    Protein Albumin Urine 10 (A) NEG^Negative mg/dL Final    Urobilinogen mg/dL Normal 0.0 - 2.0 mg/dL Final    Nitrite Urine Negative NEG^Negative Final    Leukocyte Esterase Urine Negative NEG^Negative Final    Source Catheterized Urine  Final    RBC Urine 1 0 - 2 /HPF Final    WBC Urine 1 0 - 5 /HPF Final    Squamous Epithelial /HPF Urine <1 0 - 1 /HPF Final                Clinical Quality Measure: Blood Pressure Screening     Your blood pressure was checked while you were in the emergency department today. The last reading we obtained was  BP: 118/78 . Please read the guidelines below about what these numbers mean and what you should do about them.  If your systolic blood pressure (the top number) is less than 120 and your diastolic blood pressure (the bottom number) is less than 80, then your blood pressure is normal. There is nothing more that you need to do about it.  If your systolic blood pressure (the top number) is 120-139 or your diastolic blood pressure (the bottom number) is 80-89, your blood pressure may be higher than it should be. You should have your blood pressure rechecked within a year by a primary care provider.  If your systolic blood pressure (the top number) is 140 or greater or your diastolic blood pressure (the bottom number) is 90 or greater, you may have high blood pressure. High blood pressure is treatable, but if left untreated over time it can put you at risk for heart attack, stroke, or kidney failure. You should have your blood pressure rechecked by a primary care provider within the next 4 weeks.  If your provider in the emergency department today gave you specific  "instructions to follow-up with your doctor or provider even sooner than that, you should follow that instruction and not wait for up to 4 weeks for your follow-up visit.        Thank you for choosing [facility]       Thank you for choosing [facility] for your care. Our goal is always to provide you with excellent care. Hearing back from our patients is one way we can continue to improve our services. Please take a few minutes to complete the written survey that you may receive in the mail after you visit with us. Thank you!        [facility] Information     [facility] lets you send messages to your doctor, view your test results, renew your prescriptions, schedule appointments and more. To sign up, go to [email or web address] . Click on \"Log in\" on the left side of the screen, which will take you to the Welcome page. Then click on \"Sign up Now\" on the right side of the page.     You will be asked to enter the access code listed below, as well as some personal information. Please follow the directions to create your username and password.     Your access code is: [email or web address]  Expires: 2019 11:58 AM     Your access code will  in 90 days. If you need help or a new code, please call your [facility] clinic or [phone].        Care EveryWhere ID     This is your Care EveryWhere ID. This could be used by other organizations to access your [facility] medical records  [id number]        Equal Access to Services     BRENDA LAO : Hadii timi Rivas, waadolph buenrostro, qaybyaw viera . So [facility] [place] [phone].    ATENCIÓN: Si habla español, tiene a king disposición servicios gratuitos de asistencia lingüística. Marsha al [phone].    We comply with applicable federal civil rights laws and [place] laws. We do not discriminate on the basis of race, color, national origin, age, disability, sex, sexual orientation, or gender identity.            After Visit Summary "       This is your record. Keep this with you and show to your community pharmacist(s) and doctor(s) at your next visit.

## 2018-12-02 NOTE — ED AVS SNAPSHOT
Emergency Department    [address] [place] [address]    Phone:  [phone]    Fax:  [phone]                                       Keyon Farias   MRN: [id number]    Department:   Emergency Department   Date of Visit:  12/2/2018           After Visit Summary Signature Page     I have received my discharge instructions, and my questions have been answered. I have discussed any challenges I see with this plan with the nurse or doctor.    ..........................................................................................................................................  Patient/Patient Representative Signature      ..........................................................................................................................................  Patient Representative Print Name and Relationship to Patient    ..................................................               ................................................  Date                                   Time    ..........................................................................................................................................  Reviewed by Signature/Title    ...................................................              ..............................................  Date                                               Time          22EPIC Rev 08/18

## 2018-12-03 VITALS
SYSTOLIC BLOOD PRESSURE: 120 MMHG | OXYGEN SATURATION: 97 % | HEART RATE: 78 BPM | TEMPERATURE: 98 F | DIASTOLIC BLOOD PRESSURE: 74 MMHG | RESPIRATION RATE: 17 BRPM

## 2018-12-03 NOTE — DISCHARGE INSTRUCTIONS
The Tegretol level was 14.  Call your neurologist if you have concerns that this is not the level that they want him out as it is slightly high.  Return if Keyon has a fever or worsening status.  Otherwise call your doctor about any other concerns.

## 2018-12-03 NOTE — ED PROVIDER NOTES
History     Chief Complaint:  Possible seizure    HPI   Keyon Farias is a 58 year old male with a history of traumatic brain injury,  who presents to the emergency department today with possible seizure.  The patient was evidently fine this afternoon and enjoyed the afternoon with his mother. . Per mother,  the patient was fine when he went to bed at 1900. She went into his bedroom at 2100 to give him his medication  and he was not waking up as alertly as usual. . She felt that he was having a seizure although unlike anything that she has ever seen in him before.He was staring off into space.  The patient was having a conjugate gaze and because of this his PCA and his mother thought that he had a seizure and thus EMS was called. They report he has improved enroute. His usual seizures are grand mal. The patient's last seizure was a couple years ago.    No medications were given to the patient en route to the emergency department. He was admitted  about a week ago for  Pneumonia.At that time his tegretol level was 17 His vitals were stable enroute. . At baseline the patient is nonverbal although he usually tracks with his eyes and can communicate this way. He denies having any pain here in the emergency department.     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  Deep vein thrombosis   Gastroesophageal Reflux Disease  Panhypopituitarism   Pneumonia   Seizures  Septic shock   Spastic hemiplegia affecting dominant side  Thyroid disease  Traumatic brain injury   Cerebral artery occlusion with cerebral infarction   Urinary tract infection   Ventricular fibrillation   Ventricular tachyarrhythmia   Appendicitis   Sepsis   Necrotizing pancreatitis   Encephalopathy      Past Surgical History:    Endoscopic ultrasound upper gastrointestinal tract  Endoscopic ultrasound, esophagoscopy, gastroscopy, duodenoscopy, necrosectomy   Esophagoscopy, gastroscopy, duodenoscopy x3  Head and neck surgery   Laparoscopic appendectomy   Laparoscopic assisted insertion tube gastrotomy   Orthopedic surgery  Tracheostomy x2  Vascular surgery     Family History:    History reviewed. No pertinent family history.     Social History:  The patient was accompanied to the ED by his wife.  Smoking Status: Former smoker   Smokeless Tobacco: Never used   Alcohol Use: No   Marital Status:  Single    Mom is his guardian. House with nurses and PCA there 24/7.     Review of Systems   Neurological: Positive for seizures.   All other systems reviewed and are negative.    Physical Exam   First Vitals:  Patient Vitals for the past 24 hrs:   BP Temp Temp src Pulse Heart Rate Resp SpO2   12/03/18 0014 120/74 98  F (36.7  C) Oral - 79 17 97 %   12/02/18 2156 118/78 97.8  F (36.6  C) Tympanic 78 - 16 99 %       Physical Exam  Constitutional:  Largely nonverbal. Nods appropriately to questions.      Appears well-developed and well-nourished.   HENT:   Head:    Normocephalic and atraumatic.   Right Ear:   Tympanic membrane and external ear normal.   Left Ear:   Tympanic membrane and external ear normal.   Mouth/Throat:   Oropharynx is clear and moist and mucous membranes are normal.   Eyes:    Conjunctivae normal and EOM are normal.      Pupils are equal, round, and reactive to light.   Neck:    Normal range of motion. Neck supple.   Cardiovascular:  Normal rate, S1 normal, S2 normal and normal heart sounds.      No gallop. No murmur heard.  Pulmonary/Chest:  Effort normal and breath sounds normal.      No wheezes. No rhonchi. No rales.   Abdominal:   Soft. Normal appearance. No hepatosplenomegaly.      No tenderness. No rigidity, no rebound, no guarding.      Feeding tube left upper abdomen.    Musculoskeletal:  Normal  range of motion.   Neurological:   Largely nonverbal. Right hemiparesis. Appears to track with eyes and follow what is going on. Unable to test other neurologic symptoms.   Emergency Department Course   Laboratory:  Laboratory findings were communicated with the patient and family who voiced understanding of the findings.    UA: yellow slightly cloudy urine pH urine 8.0 (H), protein albumin urine 10 o/w WNL    CBC:  WBC 10.6, HGB 12.5 (L),      BMP:  (L), Glucose 108 (H), Creatinine 0.72    Carbamazepine total: 14.2 (H)    Emergency Department Course:  Nursing notes and vitals reviewed.  The patient provided a urine sample here in the emergency department. This was sent for laboratory testing, findings above.  IV was inserted and blood was drawn for laboratory testing, results above.  2225: I performed an exam of the patient as documented above.     2334: Patient rechecked and updated. The patient was sleeping and is ready to be discharged.     Findings and plan explained to the Patient and spouse. Patient discharged home with instructions regarding supportive care, medications, and reasons to return. The importance of close follow-up was reviewed.     I personally reviewed the laboratory results with the Patient and spouse and answered all related questions prior to discharge.    Impression & Plan    Medical Decision Making:  The patient is a 58-year-old with brain injury, right hemiparesis and largely nonverbal who is brought in by mother for not being quite right.  She said he had a normal day.  He went to bed tonight and when she went in at 9:00 to give him his meds he did not seem quite right.  She did not see any seizure activity.  She said he just seemed to be staring.  She said he seems more back to normal here.  He does have occasional grand mal seizures but has not had one for couple years.  The patient is nonverbal but nods no when asked if he has any pain.  He has no fever or localizing  signs on examination.  His blood work is largely unremarkable other than  that his Tegretol level is at 14.  However it was 17 when he was in the hospital and there was no medication adjustment.  The mother said she thinks that they keep his Tegretol level at 14.  The patient is sleeping here now.  The patient  has significant help at home with nurse and PCA.  The mother feels comfortable taking him home and letting him sleep tonight and seeing if he feels better in the morning.  She is advised if he has fever or worsening symptoms to return.    Diagnosis:    ICD-10-CM    1. Altered mental status, unspecified altered mental status type R41.82        Disposition:  discharged to home    Claudio Mckoy  12/2/2018    EMERGENCY DEPARTMENT  Scribe Disclosure:  I, Claudio Mckoy, am serving as a scribe at 9:53 PM on 12/2/2018 to document services personally performed by No att. providers found based on my observations and the provider's statements to me.        Mireille Kincaid MD  12/03/18 0987

## 2018-12-03 NOTE — ED NOTES
Bed: ED29  Expected date: 12/2/18  Expected time: 9:55 PM  Means of arrival: Ambulance  Comments:  Kerri JONES 58M seizure

## 2018-12-18 ENCOUNTER — APPOINTMENT (OUTPATIENT)
Dept: CT IMAGING | Facility: CLINIC | Age: 58
DRG: 871 | End: 2018-12-18
Attending: HOSPITALIST
Payer: MEDICARE

## 2018-12-18 ENCOUNTER — HOSPITAL ENCOUNTER (INPATIENT)
Facility: CLINIC | Age: 58
LOS: 6 days | Discharge: HOME HEALTH | DRG: 871 | End: 2018-12-24
Attending: EMERGENCY MEDICINE | Admitting: HOSPITALIST
Payer: MEDICARE

## 2018-12-18 ENCOUNTER — APPOINTMENT (OUTPATIENT)
Dept: SPEECH THERAPY | Facility: CLINIC | Age: 58
DRG: 871 | End: 2018-12-18
Attending: HOSPITALIST
Payer: MEDICARE

## 2018-12-18 ENCOUNTER — APPOINTMENT (OUTPATIENT)
Dept: GENERAL RADIOLOGY | Facility: CLINIC | Age: 58
DRG: 871 | End: 2018-12-18
Attending: EMERGENCY MEDICINE
Payer: MEDICARE

## 2018-12-18 DIAGNOSIS — A41.9 SEPSIS, DUE TO UNSPECIFIED ORGANISM: ICD-10-CM

## 2018-12-18 DIAGNOSIS — J69.0 ASPIRATION PNEUMONIA OF RIGHT LUNG, UNSPECIFIED ASPIRATION PNEUMONIA TYPE, UNSPECIFIED PART OF LUNG (H): ICD-10-CM

## 2018-12-18 DIAGNOSIS — J69.0 ASPIRATION PNEUMONIA OF BOTH LOWER LOBES, UNSPECIFIED ASPIRATION PNEUMONIA TYPE (H): Primary | ICD-10-CM

## 2018-12-18 LAB
ALBUMIN SERPL-MCNC: 3.1 G/DL (ref 3.4–5)
ALBUMIN UR-MCNC: 10 MG/DL
ALP SERPL-CCNC: 88 U/L (ref 40–150)
ALT SERPL W P-5'-P-CCNC: 22 U/L (ref 0–70)
AMORPH CRY #/AREA URNS HPF: ABNORMAL /HPF
ANION GAP SERPL CALCULATED.3IONS-SCNC: 6 MMOL/L (ref 3–14)
APPEARANCE UR: CLEAR
AST SERPL W P-5'-P-CCNC: 11 U/L (ref 0–45)
BASOPHILS # BLD AUTO: 0.1 10E9/L (ref 0–0.2)
BASOPHILS NFR BLD AUTO: 0.4 %
BILIRUB SERPL-MCNC: 0.4 MG/DL (ref 0.2–1.3)
BILIRUB UR QL STRIP: NEGATIVE
BUN SERPL-MCNC: 14 MG/DL (ref 7–30)
CALCIUM SERPL-MCNC: 8.5 MG/DL (ref 8.5–10.1)
CHLORIDE SERPL-SCNC: 97 MMOL/L (ref 94–109)
CO2 SERPL-SCNC: 31 MMOL/L (ref 20–32)
COLOR UR AUTO: YELLOW
CREAT SERPL-MCNC: 0.74 MG/DL (ref 0.66–1.25)
DIFFERENTIAL METHOD BLD: ABNORMAL
EOSINOPHIL # BLD AUTO: 0.6 10E9/L (ref 0–0.7)
EOSINOPHIL NFR BLD AUTO: 4.9 %
ERYTHROCYTE [DISTWIDTH] IN BLOOD BY AUTOMATED COUNT: 13.5 % (ref 10–15)
FLUAV+FLUBV AG SPEC QL: NEGATIVE
FLUAV+FLUBV AG SPEC QL: NEGATIVE
GFR SERPL CREATININE-BSD FRML MDRD: >90 ML/MIN/1.7M2
GLUCOSE BLDC GLUCOMTR-MCNC: 153 MG/DL (ref 70–99)
GLUCOSE SERPL-MCNC: 91 MG/DL (ref 70–99)
GLUCOSE UR STRIP-MCNC: 150 MG/DL
HCT VFR BLD AUTO: 35.2 % (ref 40–53)
HGB BLD-MCNC: 11.9 G/DL (ref 13.3–17.7)
HGB UR QL STRIP: NEGATIVE
IMM GRANULOCYTES # BLD: 0 10E9/L (ref 0–0.4)
IMM GRANULOCYTES NFR BLD: 0.2 %
KETONES UR STRIP-MCNC: NEGATIVE MG/DL
LACTATE BLD-SCNC: 1.7 MMOL/L (ref 0.7–2)
LACTATE BLD-SCNC: 2.1 MMOL/L (ref 0.7–2)
LACTATE BLD-SCNC: 2.6 MMOL/L (ref 0.7–2)
LEUKOCYTE ESTERASE UR QL STRIP: NEGATIVE
LYMPHOCYTES # BLD AUTO: 2.6 10E9/L (ref 0.8–5.3)
LYMPHOCYTES NFR BLD AUTO: 20.6 %
MCH RBC QN AUTO: 29.2 PG (ref 26.5–33)
MCHC RBC AUTO-ENTMCNC: 33.8 G/DL (ref 31.5–36.5)
MCV RBC AUTO: 87 FL (ref 78–100)
MONOCYTES # BLD AUTO: 1.3 10E9/L (ref 0–1.3)
MONOCYTES NFR BLD AUTO: 10.5 %
NEUTROPHILS # BLD AUTO: 8.1 10E9/L (ref 1.6–8.3)
NEUTROPHILS NFR BLD AUTO: 63.4 %
NITRATE UR QL: NEGATIVE
NRBC # BLD AUTO: 0 10*3/UL
NRBC BLD AUTO-RTO: 0 /100
PH UR STRIP: 8.5 PH (ref 5–7)
PHOSPHATE SERPL-MCNC: 3.1 MG/DL (ref 2.5–4.5)
PLATELET # BLD AUTO: 139 10E9/L (ref 150–450)
POTASSIUM SERPL-SCNC: 4 MMOL/L (ref 3.4–5.3)
PROT SERPL-MCNC: 7.3 G/DL (ref 6.8–8.8)
RBC # BLD AUTO: 4.07 10E12/L (ref 4.4–5.9)
RBC #/AREA URNS AUTO: 0 /HPF (ref 0–2)
SODIUM SERPL-SCNC: 134 MMOL/L (ref 133–144)
SOURCE: ABNORMAL
SP GR UR STRIP: 1.02 (ref 1–1.03)
SPECIMEN SOURCE: NORMAL
SQUAMOUS #/AREA URNS AUTO: <1 /HPF (ref 0–1)
UROBILINOGEN UR STRIP-MCNC: NORMAL MG/DL (ref 0–2)
WBC # BLD AUTO: 12.8 10E9/L (ref 4–11)
WBC #/AREA URNS AUTO: 1 /HPF (ref 0–5)

## 2018-12-18 PROCEDURE — 25000128 H RX IP 250 OP 636: Performed by: EMERGENCY MEDICINE

## 2018-12-18 PROCEDURE — 96366 THER/PROPH/DIAG IV INF ADDON: CPT

## 2018-12-18 PROCEDURE — 92610 EVALUATE SWALLOWING FUNCTION: CPT | Mod: GN | Performed by: SPEECH-LANGUAGE PATHOLOGIST

## 2018-12-18 PROCEDURE — 25000125 ZZHC RX 250: Performed by: HOSPITALIST

## 2018-12-18 PROCEDURE — 85025 COMPLETE CBC W/AUTO DIFF WBC: CPT | Performed by: EMERGENCY MEDICINE

## 2018-12-18 PROCEDURE — 80053 COMPREHEN METABOLIC PANEL: CPT | Performed by: EMERGENCY MEDICINE

## 2018-12-18 PROCEDURE — 83605 ASSAY OF LACTIC ACID: CPT | Performed by: EMERGENCY MEDICINE

## 2018-12-18 PROCEDURE — 40000225 ZZH STATISTIC SLP WARD VISIT: Performed by: SPEECH-LANGUAGE PATHOLOGIST

## 2018-12-18 PROCEDURE — 00000146 ZZHCL STATISTIC GLUCOSE BY METER IP

## 2018-12-18 PROCEDURE — 25000132 ZZH RX MED GY IP 250 OP 250 PS 637: Mod: GY | Performed by: HOSPITALIST

## 2018-12-18 PROCEDURE — 99285 EMERGENCY DEPT VISIT HI MDM: CPT | Mod: 25

## 2018-12-18 PROCEDURE — G0463 HOSPITAL OUTPT CLINIC VISIT: HCPCS

## 2018-12-18 PROCEDURE — 21400002 ZZH R&B CCU CICU CRITICAL

## 2018-12-18 PROCEDURE — 84100 ASSAY OF PHOSPHORUS: CPT | Performed by: HOSPITALIST

## 2018-12-18 PROCEDURE — 83605 ASSAY OF LACTIC ACID: CPT | Performed by: HOSPITALIST

## 2018-12-18 PROCEDURE — 40000275 ZZH STATISTIC RCP TIME EA 10 MIN

## 2018-12-18 PROCEDURE — 94640 AIRWAY INHALATION TREATMENT: CPT

## 2018-12-18 PROCEDURE — 40000886 ZZH STATISTIC STEP DOWN HRS DAY

## 2018-12-18 PROCEDURE — 87804 INFLUENZA ASSAY W/OPTIC: CPT | Performed by: EMERGENCY MEDICINE

## 2018-12-18 PROCEDURE — 27210429 ZZH NUTRITION PRODUCT INTERMEDIATE LITER

## 2018-12-18 PROCEDURE — 71260 CT THORAX DX C+: CPT

## 2018-12-18 PROCEDURE — 99223 1ST HOSP IP/OBS HIGH 75: CPT | Mod: AI | Performed by: HOSPITALIST

## 2018-12-18 PROCEDURE — 40000885 ZZH STATISTIC STEP DOWN HRS EVENING

## 2018-12-18 PROCEDURE — 71046 X-RAY EXAM CHEST 2 VIEWS: CPT

## 2018-12-18 PROCEDURE — 36415 COLL VENOUS BLD VENIPUNCTURE: CPT | Performed by: HOSPITALIST

## 2018-12-18 PROCEDURE — 87040 BLOOD CULTURE FOR BACTERIA: CPT | Performed by: EMERGENCY MEDICINE

## 2018-12-18 PROCEDURE — 99207 ZZC NON-BILLABLE SERV PER CHARTING: CPT | Performed by: HOSPITALIST

## 2018-12-18 PROCEDURE — 96367 TX/PROPH/DG ADDL SEQ IV INF: CPT

## 2018-12-18 PROCEDURE — 87086 URINE CULTURE/COLONY COUNT: CPT | Performed by: EMERGENCY MEDICINE

## 2018-12-18 PROCEDURE — A9270 NON-COVERED ITEM OR SERVICE: HCPCS | Mod: GY | Performed by: HOSPITALIST

## 2018-12-18 PROCEDURE — 25000128 H RX IP 250 OP 636: Performed by: HOSPITALIST

## 2018-12-18 PROCEDURE — 96365 THER/PROPH/DIAG IV INF INIT: CPT

## 2018-12-18 PROCEDURE — 81001 URINALYSIS AUTO W/SCOPE: CPT | Performed by: EMERGENCY MEDICINE

## 2018-12-18 RX ORDER — ACETAMINOPHEN 650 MG/1
650 SUPPOSITORY RECTAL EVERY 4 HOURS PRN
Status: DISCONTINUED | OUTPATIENT
Start: 2018-12-18 | End: 2018-12-24 | Stop reason: HOSPADM

## 2018-12-18 RX ORDER — POLYETHYLENE GLYCOL 3350 17 G/17G
17 POWDER, FOR SOLUTION ORAL DAILY PRN
Status: DISCONTINUED | OUTPATIENT
Start: 2018-12-18 | End: 2018-12-19

## 2018-12-18 RX ORDER — NALOXONE HYDROCHLORIDE 0.4 MG/ML
.1-.4 INJECTION, SOLUTION INTRAMUSCULAR; INTRAVENOUS; SUBCUTANEOUS
Status: DISCONTINUED | OUTPATIENT
Start: 2018-12-18 | End: 2018-12-24 | Stop reason: HOSPADM

## 2018-12-18 RX ORDER — IOPAMIDOL 755 MG/ML
80 INJECTION, SOLUTION INTRAVASCULAR ONCE
Status: DISCONTINUED | OUTPATIENT
Start: 2018-12-18 | End: 2018-12-18

## 2018-12-18 RX ORDER — SODIUM CHLORIDE, SODIUM LACTATE, POTASSIUM CHLORIDE, CALCIUM CHLORIDE 600; 310; 30; 20 MG/100ML; MG/100ML; MG/100ML; MG/100ML
INJECTION, SOLUTION INTRAVENOUS CONTINUOUS
Status: DISCONTINUED | OUTPATIENT
Start: 2018-12-18 | End: 2018-12-21

## 2018-12-18 RX ORDER — ONDANSETRON 4 MG/1
4 TABLET, ORALLY DISINTEGRATING ORAL EVERY 6 HOURS PRN
Status: DISCONTINUED | OUTPATIENT
Start: 2018-12-18 | End: 2018-12-24 | Stop reason: HOSPADM

## 2018-12-18 RX ORDER — NITROGLYCERIN 0.4 MG/1
0.4 TABLET SUBLINGUAL EVERY 5 MIN PRN
Status: DISCONTINUED | OUTPATIENT
Start: 2018-12-18 | End: 2018-12-23

## 2018-12-18 RX ORDER — LIDOCAINE 40 MG/G
CREAM TOPICAL
Status: DISCONTINUED | OUTPATIENT
Start: 2018-12-18 | End: 2018-12-18

## 2018-12-18 RX ORDER — AMOXICILLIN 250 MG
2 CAPSULE ORAL 2 TIMES DAILY PRN
Status: DISCONTINUED | OUTPATIENT
Start: 2018-12-18 | End: 2018-12-24 | Stop reason: HOSPADM

## 2018-12-18 RX ORDER — ALBUTEROL SULFATE 0.83 MG/ML
3 SOLUTION RESPIRATORY (INHALATION)
Status: DISCONTINUED | OUTPATIENT
Start: 2018-12-18 | End: 2018-12-24 | Stop reason: HOSPADM

## 2018-12-18 RX ORDER — SODIUM CHLORIDE, SODIUM LACTATE, POTASSIUM CHLORIDE, CALCIUM CHLORIDE 600; 310; 30; 20 MG/100ML; MG/100ML; MG/100ML; MG/100ML
INJECTION, SOLUTION INTRAVENOUS CONTINUOUS
Status: DISCONTINUED | OUTPATIENT
Start: 2018-12-18 | End: 2018-12-18

## 2018-12-18 RX ORDER — ONDANSETRON 2 MG/ML
4 INJECTION INTRAMUSCULAR; INTRAVENOUS EVERY 6 HOURS PRN
Status: DISCONTINUED | OUTPATIENT
Start: 2018-12-18 | End: 2018-12-24 | Stop reason: HOSPADM

## 2018-12-18 RX ORDER — LANOLIN ALCOHOL/MO/W.PET/CERES
3 CREAM (GRAM) TOPICAL
Status: DISCONTINUED | OUTPATIENT
Start: 2018-12-18 | End: 2018-12-19

## 2018-12-18 RX ORDER — IPRATROPIUM BROMIDE AND ALBUTEROL SULFATE 2.5; .5 MG/3ML; MG/3ML
3 SOLUTION RESPIRATORY (INHALATION) 4 TIMES DAILY
Status: DISCONTINUED | OUTPATIENT
Start: 2018-12-18 | End: 2018-12-18

## 2018-12-18 RX ORDER — AMOXICILLIN 250 MG
1 CAPSULE ORAL 2 TIMES DAILY PRN
Status: DISCONTINUED | OUTPATIENT
Start: 2018-12-18 | End: 2018-12-24 | Stop reason: HOSPADM

## 2018-12-18 RX ORDER — BISACODYL 10 MG
10 SUPPOSITORY, RECTAL RECTAL DAILY PRN
Status: DISCONTINUED | OUTPATIENT
Start: 2018-12-18 | End: 2018-12-24 | Stop reason: HOSPADM

## 2018-12-18 RX ORDER — HYDROCORTISONE 10 MG/1
10 TABLET ORAL EVERY EVENING
Status: DISCONTINUED | OUTPATIENT
Start: 2018-12-18 | End: 2018-12-24 | Stop reason: HOSPADM

## 2018-12-18 RX ORDER — LEVOTHYROXINE SODIUM 137 UG/1
137 TABLET ORAL DAILY
Status: DISCONTINUED | OUTPATIENT
Start: 2018-12-18 | End: 2018-12-24 | Stop reason: HOSPADM

## 2018-12-18 RX ORDER — IPRATROPIUM BROMIDE AND ALBUTEROL SULFATE 2.5; .5 MG/3ML; MG/3ML
3 SOLUTION RESPIRATORY (INHALATION) EVERY 4 HOURS PRN
Status: DISCONTINUED | OUTPATIENT
Start: 2018-12-18 | End: 2018-12-24 | Stop reason: HOSPADM

## 2018-12-18 RX ORDER — LIDOCAINE 40 MG/G
CREAM TOPICAL
Status: DISCONTINUED | OUTPATIENT
Start: 2018-12-18 | End: 2018-12-24 | Stop reason: HOSPADM

## 2018-12-18 RX ORDER — IOPAMIDOL 755 MG/ML
80 INJECTION, SOLUTION INTRAVASCULAR ONCE
Status: COMPLETED | OUTPATIENT
Start: 2018-12-18 | End: 2018-12-18

## 2018-12-18 RX ORDER — CARBAMAZEPINE 100 MG/5ML
150 SUSPENSION ORAL EVERY 6 HOURS
Status: DISCONTINUED | OUTPATIENT
Start: 2018-12-18 | End: 2018-12-24 | Stop reason: HOSPADM

## 2018-12-18 RX ORDER — PIPERACILLIN SODIUM, TAZOBACTAM SODIUM 4; .5 G/20ML; G/20ML
4.5 INJECTION, POWDER, LYOPHILIZED, FOR SOLUTION INTRAVENOUS EVERY 6 HOURS
Status: DISCONTINUED | OUTPATIENT
Start: 2018-12-18 | End: 2018-12-23

## 2018-12-18 RX ORDER — SCOLOPAMINE TRANSDERMAL SYSTEM 1 MG/1
1 PATCH, EXTENDED RELEASE TRANSDERMAL
Status: DISCONTINUED | OUTPATIENT
Start: 2018-12-18 | End: 2018-12-24 | Stop reason: HOSPADM

## 2018-12-18 RX ORDER — PIPERACILLIN SODIUM, TAZOBACTAM SODIUM 4; .5 G/20ML; G/20ML
4.5 INJECTION, POWDER, LYOPHILIZED, FOR SOLUTION INTRAVENOUS ONCE
Status: COMPLETED | OUTPATIENT
Start: 2018-12-18 | End: 2018-12-18

## 2018-12-18 RX ORDER — ACETAMINOPHEN 325 MG/1
650 TABLET ORAL EVERY 4 HOURS PRN
Status: DISCONTINUED | OUTPATIENT
Start: 2018-12-18 | End: 2018-12-19

## 2018-12-18 RX ADMIN — SODIUM CHLORIDE, POTASSIUM CHLORIDE, SODIUM LACTATE AND CALCIUM CHLORIDE 1000 ML: 600; 310; 30; 20 INJECTION, SOLUTION INTRAVENOUS at 02:29

## 2018-12-18 RX ADMIN — SODIUM CHLORIDE, POTASSIUM CHLORIDE, SODIUM LACTATE AND CALCIUM CHLORIDE: 600; 310; 30; 20 INJECTION, SOLUTION INTRAVENOUS at 08:12

## 2018-12-18 RX ADMIN — SODIUM CHLORIDE 70 ML: 9 INJECTION, SOLUTION INTRAVENOUS at 09:48

## 2018-12-18 RX ADMIN — LEVOTHYROXINE SODIUM 137 MCG: 137 TABLET ORAL at 14:39

## 2018-12-18 RX ADMIN — VANCOMYCIN HYDROCHLORIDE 1500 MG: 5 INJECTION, POWDER, LYOPHILIZED, FOR SOLUTION INTRAVENOUS at 03:04

## 2018-12-18 RX ADMIN — SCOPALAMINE 1 PATCH: 1 PATCH, EXTENDED RELEASE TRANSDERMAL at 12:22

## 2018-12-18 RX ADMIN — PIPERACILLIN SODIUM,TAZOBACTAM SODIUM 4.5 G: 4; .5 INJECTION, POWDER, FOR SOLUTION INTRAVENOUS at 14:25

## 2018-12-18 RX ADMIN — IOPAMIDOL 80 ML: 755 INJECTION, SOLUTION INTRAVENOUS at 09:47

## 2018-12-18 RX ADMIN — SODIUM CHLORIDE, POTASSIUM CHLORIDE, SODIUM LACTATE AND CALCIUM CHLORIDE: 600; 310; 30; 20 INJECTION, SOLUTION INTRAVENOUS at 16:31

## 2018-12-18 RX ADMIN — IPRATROPIUM BROMIDE AND ALBUTEROL SULFATE 3 ML: .5; 2.5 SOLUTION RESPIRATORY (INHALATION) at 11:03

## 2018-12-18 RX ADMIN — CARBAMAZEPINE 150 MG: 100 SUSPENSION ORAL at 19:03

## 2018-12-18 RX ADMIN — BRIVARACETAM 100 MG: 10 SOLUTION ORAL at 22:01

## 2018-12-18 RX ADMIN — CARBAMAZEPINE 150 MG: 100 SUSPENSION ORAL at 12:37

## 2018-12-18 RX ADMIN — SODIUM CHLORIDE, POTASSIUM CHLORIDE, SODIUM LACTATE AND CALCIUM CHLORIDE: 600; 310; 30; 20 INJECTION, SOLUTION INTRAVENOUS at 05:17

## 2018-12-18 RX ADMIN — HYDROCORTISONE 15 MG: 5 TABLET ORAL at 14:39

## 2018-12-18 RX ADMIN — HYDROCORTISONE 10 MG: 10 TABLET ORAL at 20:10

## 2018-12-18 RX ADMIN — PIPERACILLIN SODIUM,TAZOBACTAM SODIUM 4.5 G: 4; .5 INJECTION, POWDER, FOR SOLUTION INTRAVENOUS at 02:28

## 2018-12-18 RX ADMIN — PIPERACILLIN SODIUM,TAZOBACTAM SODIUM 4.5 G: 4; .5 INJECTION, POWDER, FOR SOLUTION INTRAVENOUS at 08:12

## 2018-12-18 RX ADMIN — PIPERACILLIN SODIUM,TAZOBACTAM SODIUM 4.5 G: 4; .5 INJECTION, POWDER, FOR SOLUTION INTRAVENOUS at 20:10

## 2018-12-18 RX ADMIN — BRIVARACETAM 100 MG: 10 SOLUTION ORAL at 12:16

## 2018-12-18 RX ADMIN — SODIUM CHLORIDE, POTASSIUM CHLORIDE, SODIUM LACTATE AND CALCIUM CHLORIDE 1000 ML: 600; 310; 30; 20 INJECTION, SOLUTION INTRAVENOUS at 03:55

## 2018-12-18 ASSESSMENT — ACTIVITIES OF DAILY LIVING (ADL)
ADLS_ACUITY_SCORE: 35
ADLS_ACUITY_SCORE: 39
ADLS_ACUITY_SCORE: 39
ADLS_ACUITY_SCORE: 41

## 2018-12-18 NOTE — PLAN OF CARE
Pt arrived to floor around 0600. Alert, vitally stable. Non-verbal, but can communicate a little with gestures. Total cares. Denies pain.
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
## 2018-12-18 NOTE — PROGRESS NOTES
12/18/18 1206   General Information   Onset Date 12/18/18   Start of Care Date 12/18/18   Referring Physician Dr. Epperson   Patient Profile Review/OT: Additional Occupational Profile Info See Profile for full history and prior level of function   Patient/Family Goals Statement Patient not able to state.    Swallowing Evaluation Bedside swallow evaluation   Behaviorial Observations Alert   Mode of current nutrition NPO;PEG   Respiratory Status Room air   Comments 58 year old gentleman with TBI leading to spastic hemiplegia as well as panhypopituitarism, seizure disorder, chronic dysphagia status post PEG tube placement, and multiple episodes of sepsis due to recurrent aspiration pneumonia who presents with fever and is found to have severe sepsis suspected due to recurrent aspiration pneumonia.   Clinical Swallow Evaluation   Oral Musculature anomalies present   Structural Abnormalities present   Dentition present and adequate   Secretion Management problems swallowing secretions   Mucosal Quality adequate   Mandibular Strength and Mobility intact   Oral Labial Strength and Mobility impaired retraction  (Right sided weakness.)   Lingual Strength and Mobility impaired protrusion;impaired anterior elevation;impaired left lateral movement;impaired right lateral movement   Velar Elevation impaired   Buccal Strength and Mobility impaired   Laryngeal Function Voicing initiated   Oral Musculature Comments Moderately impaired.    Additional Documentation Yes   Clinical Swallow Eval: Honey Thick Liquid Texture Trial   Mode of Presentation, Honey spoon;fed by clinician   Volume of Honey Presented 1 teaspoon   Oral Phase, Honey Poor AP movement;Residue in oral cavity   Pharyngeal Phase, Honey impaired;coughing/choking;reduction in laryngeal movement   Diagnostic Statement Incomplete swallow with aspiration.    Swallow Compensations   Swallow Compensations Pacing;Reduce amounts   Results Oral difficulties only;Suspect  aspiration   Swallow Eval: Clinical Impressions   Skilled Criteria for Therapy Intervention Current level of function same as previous level of function   Functional Assessment Scale (FAS) 1   Treatment Diagnosis Severe oral and pharyngeal dysphagia   Diet texture recommendations NPO   Demonstrates Need for Referral to Another Service dietitian   Predicted Duration of Therapy Intervention (days/wks) Evaluation only   Anticipated Discharge Disposition home w/ home health   Risks and Benefits of Treatment have been explained. Yes   Patient, family and/or staff in agreement with Plan of Care Yes   Clinical Impression Comments Patient presents with severe oral and pharyngeal dysphagia at bedside. Oral motor examination revealed moderate lingual deficits for ROM/strength. No velar elevation noted and unable to impound air. Trial of honey thick liquids given with poor bolus control and tongue thrusting and unable to propel the bolus posteriorly. Incomplete swallow response with coughing noted. Recent video at another hospital revealed aspiration of honey thick liquids and pudding. Patient has been seen by this department on multiple admissions and swallow function continues to be severely impaired and do not anticipate any improvement. Recommend: 1. NPO and receive nutrition via the PEG tube. No further skilled intervention is indicated.    Total Evaluation Time   Total Evaluation Time (Minutes) 15

## 2018-12-18 NOTE — PLAN OF CARE
Discharge Planner SLP   Patient plan for discharge: Patient not able to state.   Current status: Bedside swallow evaluation completed per MD orders. Patient presents with severe oral and pharyngeal dysphagia at bedside. Oral motor examination revealed moderate lingual deficits for ROM/strength. No velar elevation noted and unable to impound air. Trial of honey thick liquids given with poor bolus control and tongue thrusting and unable to propel the bolus posteriorly. Incomplete swallow response with coughing noted. Recent video at another hospital revealed aspiration of honey thick liquids and pudding. Patient has been seen by this department on multiple admissions and swallow function continues to be severely impaired and do not anticipate any improvement. Recommend: 1. NPO and receive nutrition via the PEG tube. No further skilled intervention is indicated.   Barriers to return to prior living situation: Acuity of his illness.   Recommendations for discharge: Per medical team/  Rationale for recommendations: No further skilled intervention indicated. Patient is at his baseline.         Entered by: Heather Adler 12/18/2018 1:02 PM

## 2018-12-18 NOTE — ED PROVIDER NOTES
History     Chief Complaint:  Fever       HPI   Keyon Farias is a 58 year old male who presents for evaluation of a fever. Per EMS report, the patient lives with his mother and is cared for by his mother and PCA. The patient's PCA called EMS reporting that the patient was running a fever of 101. PCA also reported that the patient is in the hospital every other week with a fever. Note, the patient's mother was admitted to the hospital earlier in the day for shortness of breath.  Patient has frequent fevers most often from aspiration pneumonia.  He has had sepsis multiple times.      Allergies:  Dilantin  Valproic acid.     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)   DVT of upper extremity (deep vein thrombosis)    Gastro-oesophageal reflux disease   Panhypopituitarism    Pneumonia   Seizures    Septic shock    Spastic hemiplegia affecting dominant side    Thyroid disease   Tracheostomy care    Traumatic brain injury    Unspecified cerebral artery occlusion with cerebral infarction   UTI (urinary tract infection)   Ventricular fibrillation    Ventricular tachyarrhythmia     Past Surgical History:    HEAD & NECK SURGERY   LAPAROSCOPIC APPENDECTOMY   LAPAROSCOPIC ASSISTED INSERTION TUBE GASTROTOMY   ORTHOPEDIC SURGERY   TRACHEOSTOMY   VASCULAR SURGERY    Family History:    History reviewed. No pertinent family history.     Social History:  Smoking status: Former smoker  Alcohol use: No  Marital Status:  Single [1]       Review of Systems   Reason unable to perform ROS: nonverbal.   Constitutional: Positive for fever.   Respiratory: Positive for shortness of breath.            Physical Exam     Patient Vitals for  the past 24 hrs:   BP Temp Temp src Heart Rate Resp SpO2 Weight   12/18/18 0300 120/73 -- -- 100 13 97 % --   12/18/18 0207 -- -- -- -- -- -- 68 kg (150 lb)   12/18/18 0200 117/74 -- -- -- -- 93 % --   12/18/18 0151 (!) 86/72 103.1  F (39.5  C) Rectal 101 19 97 % --         Physical Exam  General: Resting on the gurney, appears somewhat uncomfortable, frequent cough, secretions present  Head:  The scalp, face, and head appear normal  Mouth/Throat: Mucus membranes are moist  CV:  Regular rate    Normal S1 and S2  No pathological murmur   Resp:  Breath sounds clear and equal bilaterally    Non-labored, no retractions or accessory muscle use    Coarse lung sounds throughout.     No wheezing   GI:  Abdomen is soft, no rigidity    No tenderness to palpation  MS:  Normal motor assessment of all extremities.    Good capillary refill noted.      Skin:   No rash or lesions noted.  Neuro:   Speech is normal and fluent. No apparent deficit.  Psych: Awake. Alert.  Normal affect.      Appropriate interactions.      Emergency Department Course     Imaging:  Radiographic findings were communicated with the patient who voiced understanding of the findings.    XR chest  IMPRESSION: Shallow inspiration. Minimal patchy opacity left lower  lung compatible with atelectasis or minimal infiltrate. Previously  seen right perihilar infiltrate has resolved. Heart size is normal.  As read by radiology     Laboratory:  Blood culture x2: In process  CMP: Albumin 3.1, (Creatinine 0.74)  CBC: WBC 12.8, HGB 11.9, )  0202: Lactic acid 2.1  Influenza A/B antigen: Negative    UA: Glucose 150, pH 8.5, Protein albumin 10, Amorphous crystals few, o/w negative  Urine culture: In process.     Interventions:  0304: Vancocin 1500 mg in NaCl 0.9% 250 mL IV Infusion  0229: Lactated ringers 1000 mL IV Bolus   0305: Zosyn 4.5 g IV Infusion    Emergency Department Course:  Past medical records, nursing notes, and vitals reviewed.  0148: I performed an  exam of the patient and obtained history, as documented above.    IV inserted and blood drawn.    The patient was sent for a Chest XR while in the emergency department, findings above.    Findings and plan explained to the mother who consents to admission.     0239: Discussed the patient with Dr. Garcia, who will admit the patient to a [facility] bed for further monitoring, evaluation, and treatment.       Impression & Plan      Medical Decision Making:  Keyon Farias is a 58 year old male who presents for evaluation of fever and cough.  The patient was promptly evaluated for possible sepsis and met criteria with fever, tachycardia, and likely source. The patient had a slightly low blood pressure throughout the ED stay. Lactic acid was measured at 2.1. Therefore the patient did not meet criteria for Severe Sepsis or Septic Shock.     The etiology of the sepsis is most likely aspiratoin pneumonia. Broad spectrum antibiotics were initiated in the ED after 2 sets of blood cultures and a urine specimen was obtained. A broad workup was done but I feel this is most likely infectious and treatment as noted above was directed towards infectious etiologies.   Dr. Garcia has accepted the patient for ongoing evaluation, monitoring and management.       Diagnosis:    ICD-10-CM    1. Sepsis, due to unspecified organism (H) A41.9        Disposition:  Admitted to [facility]        Rikki Daigle  12/18/2018    EMERGENCY DEPARTMENT    Scribe Disclosure:  DONIS, Rikki Daigle, am serving as a scribe at 1:48 AM on 12/18/2018 to document services personally performed by Monica Dawson MD based on my observations and the provider's statements to me.        Monica Dawson MD  12/18/18 [id number]
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
## 2018-12-18 NOTE — ED NOTES
"[facility]  ED Nurse Handoff Report    ED Chief complaint: Fever (Fever starting tonight.  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  Yes         If yes, is there guardianship documents in the Epic \"Code/ACP\" activity?  Yes         Guardian Notified?  Yes  [place] - Suicide Severity Rating Scale Completed?  No, secondary to confusion  If yes, what color did the patient score?  N/A    Patient Report: Initial Complaint: fever  Focused Assessment: patient comes to the ED complaining of a fever.  Patient lives with his mom who is his care provider.  She is currently hospitalized.  Upon arrival patient had a temp of 103.1.  Patient is not verbal and does not follow commands. Red area noted to sacrum.     Tests Performed: labs, cxr  Abnormal Results:   Labs Ordered and Resulted from Time of ED Arrival Up to the Time of Departure from the ED   CBC WITH PLATELETS DIFFERENTIAL - Abnormal; Notable for the following components:       Result Value    WBC 12.8 (*)     RBC Count 4.07 (*)     Hemoglobin 11.9 (*)     Hematocrit 35.2 (*)     Platelet Count 139 (*)     All other components within normal limits "   COMPREHENSIVE METABOLIC PANEL - Abnormal; Notable for the following components:    Albumin 3.1 (*)     All other components within normal limits   LACTIC ACID WHOLE BLOOD - Abnormal; Notable for the following components:    Lactic Acid 2.1 (*)     All other components within normal limits   ROUTINE UA WITH MICROSCOPIC - Abnormal; Notable for the following components:    Glucose Urine 150 (*)     pH Urine 8.5 (*)     Protein Albumin Urine 10 (*)     Amorphous Crystals Few (*)     All other components within normal limits   PULSE OXIMETRY NURSING   CARDIAC CONTINUOUS MONITORING   MEASURE URINE OUTPUT   PATIENT CARE ORDER   URINE CULTURE AEROBIC BACTERIAL   BLOOD CULTURE   BLOOD CULTURE   INFLUENZA A/B ANTIGEN       Treatments provided: IV fluids, abx    Family Comments: Mom is guardian and care taker.  She is admitted in room 607 and would like to be contacted with finding when available     OBS brochure/video discussed/provided to patient/family: N/A              Name of person given brochure if not patient:               Relationship to patient:     ED Medications:   Medications   lactated ringers infusion (not administered)   vancomycin (VANCOCIN) 1,500 mg in sodium chloride 0.9 % 250 mL intermittent infusion (1,500 mg Intravenous New Bag 12/18/18 0304)   lactated ringers BOLUS 2,151 mL (1,000 mLs Intravenous New Bag 12/18/18 0229)   piperacillin-tazobactam (ZOSYN) 4.5 g vial to attach to  mL bag (0 g Intravenous Stopped 12/18/18 0305)       Drips infusing?:  Yes    For the majority of the shift this patient was Green.   Interventions performed were .    Severe Sepsis OR Septic Shock Diagnosis Present: No    To be done/followed up on inpatient unit:      ED NURSE PHONE NUMBER: *[phone]

## 2018-12-18 NOTE — CONSULTS
"CLINICAL NUTRITION SERVICES  -  ASSESSMENT NOTE      Recommendations Ordered by Registered Dietitian (RD):   Enteral Nutrition composition: Isosource 1.5 at 100 mL/hr x 12 hours (7am to 7pm), providing 1800 kcal (26 kcal/kg), 82g protein (1.2 kcal/kg), 211g CHO, 18g fiber and 912mL water.   Feeding Tube Flush: 120mL before and after TF   Future/Additional Recommendations:   Monitor IVF, increase water flushes when IVF are discontinued    Malnutrition:   % Weight Loss:  > 5% in 1 month (severe malnutrition)  % Intake:  Unable to assess due to limited nutrition history   Subcutaneous Fat Loss:  Unable to assess at this time  Muscle Loss:  Unable to assess at this time  Fluid Retention:  None noted    Malnutrition Diagnosis: Unable to determine due to unable to complete physical assessment and no family present         REASON FOR ASSESSMENT  Keyon Farias is a 58 year old male seen by Registered Dietitian for Provider Order - Registered Dietitian to Assess and Order TF per Medical Nutrition protocol and RN Consult - Low avani score, G/J-tube       NUTRITION HISTORY  - Information obtained from chart review, no family present during visit   - Pt known to this service, last see by RD on 11/22  \"He has been on Jevity 1.5 (5 cans daily) x 12 hr during the day, 7am - 7pm, at 100 ml/hr unless he is semi-recumbent, at which time it is decreased to 60 mL/hr.  This provides 1778kcal, 76g protein, 255g CHO, 27g fiber and 900mL free water  -Mother would like to keep rate at no more than 100mL/hr   H2O flushes with meds and 120 mL 4 times daily\"    -Pt lives with his mother and is cared for by mother and PCA  -Recent hospital stay 11/22-11/25  -Hx of:  TBI, DVT, GERD, pneumonia, seizures, septic shock, spastic hemiplegia affecting dominant side and thyroid disease     CURRENT NUTRITION ORDERS  Diet Order:     NPO     Current Intake/Tolerance:  -IVF at 125mL/ hr  -Na 134 (NL)  -K 4 (NL)  -BG < 150: HgA1c 6.3 (H) - " "11/22      PHYSICAL FINDINGS  Observed  Unable to assess at this time pt covered with blanket, resting peacefully   Obtained from Chart/Interdisciplinary Team  -Current PEG tube  -Per SLP 12/18: \"Patient presents with severe oral and pharyngeal dysphagia at bedside\"    ANTHROPOMETRICS  Height: 6' kg   Weight: 150 lbs 0 oz (68 kg)  Body mass index is 20.34 kg/m .  Weight Status:  Normal BMI  IBW: 80.9 kg   % IBW: 84%  Weight History: wt loss of 3.7 kg (5.2 %) over the last month   Wt Readings from Last 25 Encounters:   12/18/18 68 kg (150 lb)   11/25/18 71.7 kg (158 lb 1.1 oz)   11/04/18 71.5 kg (157 lb 10.1 oz)   09/22/18 70.3 kg (155 lb)   09/21/18 78 kg (171 lb 15.3 oz)   09/03/18 72.3 kg (159 lb 6.3 oz)   07/07/18 74.7 kg (164 lb 10.9 oz)   05/19/18 71.8 kg (158 lb 3.2 oz)   05/05/18 77.4 kg (170 lb 10.2 oz)   04/04/18 72.6 kg (160 lb)   03/13/18 70.3 kg (155 lb)   02/21/18 73 kg (161 lb)   12/31/17 76.2 kg (168 lb)   10/07/17 77.3 kg (170 lb 6.7 oz)   07/27/17 73.9 kg (163 lb)   06/15/17 70.3 kg (155 lb)   06/07/17 70.7 kg (155 lb 12.8 oz)   05/15/17 74.8 kg (165 lb)   02/09/17 73.3 kg (161 lb 8 oz)   01/23/17 74 kg (163 lb 2.3 oz)   12/09/16 79.5 kg (175 lb 4.3 oz)   10/31/16 72.7 kg (160 lb 3 oz)   09/09/16 93.8 kg (206 lb 12.7 oz)   01/06/16 79.1 kg (174 lb 6.1 oz)   11/18/15 76.6 kg (168 lb 12.8 oz)         LABS  Labs reviewed    MEDICATIONS  Medications reviewed      ASSESSED NUTRITION NEEDS PER APPROVED PRACTICE GUIDELINES:    Dosing Weight 68 kg ( actual wt)   Estimated Energy Needs: 6933-2292 kcals (25-30 Kcal/Kg)  Justification: maintenance  Estimated Protein Needs:  grams protein (1.2-1.5 g pro/Kg)  Justification: preservation of lean body mass  Estimated Fluid Needs: 3725-5741 mL (1 mL/Kcal)  Justification: maintenance    MALNUTRITION:  % Weight Loss:  > 5% in 1 month (severe malnutrition)  % Intake:  Unable to assess due to limited nutrition history   Subcutaneous Fat Loss:  Unable to assess at " this time  Muscle Loss:  Unable to assess at this time  Fluid Retention:  None noted    Malnutrition Diagnosis: Unable to determine due to unable to complete physical assessment and no family present       NUTRITION DIAGNOSIS:  Unintended weight loss related to overall medical status as evidenced by recent hospital stay and wt loss of 3.7 kg (5.2 %) over the last month       NUTRITION INTERVENTIONS  Recommendations / Nutrition Prescription  Diet per MD and SLP  Isosource 1.5 at 100mL x 12hrs (7am - 7pm)      Implementation  Nutrition education: Not appropriate at this time due to patient condition  EN Composition: Isosource 1.5 at 100 mL/hr x 12 hours (7am to 7pm), providing 1800 kcal (26 kcal/kg), 82g protein (1.2 kcal/kg), 211g CHO, 18g fiber and 912mL water.   Feeding Tube Flush: 120mL before and after TF      Nutrition Goals  Initiate enteral nutrition within the next 24 hours       MONITORING AND EVALUATION:  Progress towards goals will be monitored and evaluated per protocol and Practice Guidelines      Meliza Washburn RD, LD

## 2018-12-18 NOTE — PHARMACY-ADMISSION MEDICATION HISTORY
Admission medication history interview status for the 12/18/2018  admission is complete. See EPIC admission navigator for prior to admission medications     Medication history source reliability:Good    Actions taken by pharmacist (provider contacted, etc): spoke with mother     Additional medication history information not noted on PTA med list :None    Medication reconciliation/reorder completed by provider prior to medication history? Yes    Time spent in this activity: 20 minutes    Prior to Admission medications    Medication Sig Last Dose Taking? Auth Provider   acetaminophen (TYLENOL) 500 MG tablet 1,000 mg by Oral or FT or NG tube route every 6 hours as needed for mild pain prn med Yes Unknown, Entered By History   amoxicillin-clavulanate (AUGMENTIN) 500-125 MG per tablet 1 tablet by Per G Tube route 3 times daily prn med Yes Cele Maldonado MD   bacitracin ointment Apply topically daily as needed for wound care To PEG site. prn med Yes Unknown, Entered By History   Brivaracetam (BRIVIACT) 10 MG/ML solution 100 mg by Oral or FT or NG tube route 2 times daily @0900 and 2100  Yes Reported, Patient   calcium carbonate 1250 (500 CA) MG/5ML SUSP suspension 5 mLs (1,250 mg) by Per J Tube route 3 times daily (with meals)  Yes Mariana Venegas MD   carBAMazepine (TEGRETOL) 100 MG/5ML suspension Take 150 mg by mouth every 6 hours At 06:00, 12:00, 18:00 and 24:00 for seizures  Yes Unknown, Entered By History   hydrocortisone (CORTEF) 5 MG tablet 10 mg by Oral or FT or NG tube route every evening 2 tablets x 5mg=10mg  Yes Unknown, Entered By History   hydrocortisone (CORTEF) 5 MG tablet 20 mg by Oral or FT or NG tube route every morning 3 tablets x 5mg=15mg   Yes Unknown, Entered By History   levothyroxine (SYNTHROID/LEVOTHROID) 137 MCG tablet 137 mcg by Oral or FT or NG tube route daily  Yes Unknown, Entered By History   melatonin (MELATONIN) 1 MG/ML LIQD liquid 6 mg by Jejunal Tube route At Bedtime    Yes Unknown, Entered By History   Multiple Vitamins-Minerals (CENTRUM SILVER) per tablet Take 1 tablet by mouth daily Crush and feed via j-tube  Yes Unknown, Entered By History   pantoprazole (PROTONIX) 2 mg/mL SUSP suspension 40 mg by Per NG tube route daily   Yes Unknown, Entered By History   potassium & sodium phosphates (NEUTRA-PHOS) 280-160-250 MG Packet Take 1 packet by mouth 3 times daily 0900, 1500, 2100.   Yes Unknown, Entered By History   saccharomyces boulardii (FLORASTOR) 250 MG capsule 1 capsule (250 mg) by Oral or Feeding Tube route 2 times daily  Yes Lakia Beebe PA-C   scopolamine (TRANSDERM-SCOP, 1.5 MG,) 72 hr patch Place 1 patch onto the skin every 72 hours  Yes Reported, Patient   testosterone cypionate (DEPOTESTOTERONE CYPIONATE) 200 MG/ML injection Inject 76 mg into the muscle See Admin Instructions Every 2 weeks.  76 mg or 0.38 mL  Yes Unknown, Entered By History   Vitamin D, Cholecalciferol, 1000 units TABS Take 2 tablets by mouth every morning  Yes Unknown, Entered By History   Wheat Dextrin (BENEFIBER PO) Take 2 teaspoonful by mouth daily   Yes Unknown, Entered By History   albuterol (2.5 MG/3ML) 0.083% neb solution Take 1 vial by nebulization every 4 hours as needed for shortness of breath / dyspnea or wheezing   Unknown, Entered By History   albuterol (2.5 MG/3ML) 0.083% neb solution Take 1 vial (2.5 mg) by nebulization every 4 hours as needed for shortness of breath / dyspnea   Tamara Mcwilliams MD Beth Mulder, PharmD

## 2018-12-18 NOTE — PROGRESS NOTES
[facility]    Sepsis Evaluation Progress Note    Date of Service: 12/18/2018    I was called to see Keyon GLADYS Farias due to abnormal vital signs triggering the Sepsis SIRS screening alert. He is known to have an infection.     Physical Exam    Vital Signs:  Temp: 98.6  F (37  C) Temp src: Oral BP: 97/63   Heart Rate: 88 Resp: 22 SpO2: 90 % O2 Device: None (Room air)      Lab:  Lactic Acid   Date Value Ref Range Status   12/18/2018 2.1 (H) 0.7 - 2.0 mmol/L Final     Lactate for Sepsis Protocol   Date Value Ref Range Status   12/18/2018 2.6 (H) 0.7 - 2.0 mmol/L Final     Comment:     Significant value called to and read back by  MAGUI SMITH ON 33 AT 1305.SO         The patient is at baseline mental status.    The rest of their physical exam is significant for low blood pressure.    Assessment and Plan    The SIRS and exam findings are likely due to   sepsis.     ID: The patient is currently on the following antibiotics:  Anti-infectives (From now, onward)    Start     Dose/Rate Route Frequency Ordered Stop    12/18/18 0800  piperacillin-tazobactam (ZOSYN) 4.5 g vial to attach to  mL bag      4.5 g  over 30 Minutes Intravenous EVERY 6 HOURS 12/18/18 0627          Current antibiotic coverage is appropriate for source of infection.    Fluid: Fluid bolus not indicated due to respiratory compromise.    Lab: Repeat lactic acid ordered for 2 hours from now.    Disposition: The patient will remain on the current unit. We will continue to monitor this patient closely.    Hayden Epperson DO

## 2018-12-19 LAB
ANION GAP SERPL CALCULATED.3IONS-SCNC: 6 MMOL/L (ref 3–14)
BACTERIA SPEC CULT: NO GROWTH
BASOPHILS # BLD AUTO: 0 10E9/L (ref 0–0.2)
BASOPHILS NFR BLD AUTO: 0.6 %
BUN SERPL-MCNC: 9 MG/DL (ref 7–30)
CALCIUM SERPL-MCNC: 8.6 MG/DL (ref 8.5–10.1)
CHLORIDE SERPL-SCNC: 104 MMOL/L (ref 94–109)
CO2 SERPL-SCNC: 27 MMOL/L (ref 20–32)
CREAT SERPL-MCNC: 0.85 MG/DL (ref 0.66–1.25)
DIFFERENTIAL METHOD BLD: ABNORMAL
EOSINOPHIL # BLD AUTO: 0.6 10E9/L (ref 0–0.7)
EOSINOPHIL NFR BLD AUTO: 9.2 %
ERYTHROCYTE [DISTWIDTH] IN BLOOD BY AUTOMATED COUNT: 13.6 % (ref 10–15)
GFR SERPL CREATININE-BSD FRML MDRD: >90 ML/MIN/{1.73_M2}
GLUCOSE BLDC GLUCOMTR-MCNC: 114 MG/DL (ref 70–99)
GLUCOSE BLDC GLUCOMTR-MCNC: 95 MG/DL (ref 70–99)
GLUCOSE SERPL-MCNC: 88 MG/DL (ref 70–99)
HCT VFR BLD AUTO: 31.8 % (ref 40–53)
HGB BLD-MCNC: 10.7 G/DL (ref 13.3–17.7)
IMM GRANULOCYTES # BLD: 0 10E9/L (ref 0–0.4)
IMM GRANULOCYTES NFR BLD: 0.1 %
LYMPHOCYTES # BLD AUTO: 2.2 10E9/L (ref 0.8–5.3)
LYMPHOCYTES NFR BLD AUTO: 32.7 %
Lab: NORMAL
MCH RBC QN AUTO: 29.1 PG (ref 26.5–33)
MCHC RBC AUTO-ENTMCNC: 33.6 G/DL (ref 31.5–36.5)
MCV RBC AUTO: 86 FL (ref 78–100)
MONOCYTES # BLD AUTO: 0.9 10E9/L (ref 0–1.3)
MONOCYTES NFR BLD AUTO: 13 %
NEUTROPHILS # BLD AUTO: 3 10E9/L (ref 1.6–8.3)
NEUTROPHILS NFR BLD AUTO: 44.4 %
NRBC # BLD AUTO: 0 10*3/UL
NRBC BLD AUTO-RTO: 0 /100
PLATELET # BLD AUTO: 105 10E9/L (ref 150–450)
POTASSIUM SERPL-SCNC: 4.2 MMOL/L (ref 3.4–5.3)
PROCALCITONIN SERPL-MCNC: <0.05 NG/ML
RBC # BLD AUTO: 3.68 10E12/L (ref 4.4–5.9)
SODIUM SERPL-SCNC: 137 MMOL/L (ref 133–144)
SPECIMEN SOURCE: NORMAL
WBC # BLD AUTO: 6.9 10E9/L (ref 4–11)

## 2018-12-19 PROCEDURE — 85025 COMPLETE CBC W/AUTO DIFF WBC: CPT | Performed by: HOSPITALIST

## 2018-12-19 PROCEDURE — 00000146 ZZHCL STATISTIC GLUCOSE BY METER IP

## 2018-12-19 PROCEDURE — 80048 BASIC METABOLIC PNL TOTAL CA: CPT | Performed by: HOSPITALIST

## 2018-12-19 PROCEDURE — 25000128 H RX IP 250 OP 636: Performed by: HOSPITALIST

## 2018-12-19 PROCEDURE — 84145 PROCALCITONIN (PCT): CPT | Performed by: HOSPITALIST

## 2018-12-19 PROCEDURE — 21400002 ZZH R&B CCU CICU CRITICAL

## 2018-12-19 PROCEDURE — 36415 COLL VENOUS BLD VENIPUNCTURE: CPT | Performed by: HOSPITALIST

## 2018-12-19 PROCEDURE — 99232 SBSQ HOSP IP/OBS MODERATE 35: CPT | Performed by: HOSPITALIST

## 2018-12-19 PROCEDURE — 25000132 ZZH RX MED GY IP 250 OP 250 PS 637: Mod: GY | Performed by: HOSPITALIST

## 2018-12-19 PROCEDURE — A9270 NON-COVERED ITEM OR SERVICE: HCPCS | Mod: GY | Performed by: HOSPITALIST

## 2018-12-19 RX ORDER — POLYETHYLENE GLYCOL 3350 17 G/17G
17 POWDER, FOR SOLUTION ORAL DAILY PRN
Status: DISCONTINUED | OUTPATIENT
Start: 2018-12-19 | End: 2018-12-24 | Stop reason: HOSPADM

## 2018-12-19 RX ORDER — LANOLIN ALCOHOL/MO/W.PET/CERES
3 CREAM (GRAM) TOPICAL
Status: DISCONTINUED | OUTPATIENT
Start: 2018-12-19 | End: 2018-12-24 | Stop reason: HOSPADM

## 2018-12-19 RX ADMIN — CARBAMAZEPINE 150 MG: 100 SUSPENSION ORAL at 00:03

## 2018-12-19 RX ADMIN — PIPERACILLIN SODIUM,TAZOBACTAM SODIUM 4.5 G: 4; .5 INJECTION, POWDER, FOR SOLUTION INTRAVENOUS at 02:07

## 2018-12-19 RX ADMIN — CARBAMAZEPINE 150 MG: 100 SUSPENSION ORAL at 12:05

## 2018-12-19 RX ADMIN — PIPERACILLIN SODIUM,TAZOBACTAM SODIUM 4.5 G: 4; .5 INJECTION, POWDER, FOR SOLUTION INTRAVENOUS at 14:11

## 2018-12-19 RX ADMIN — MICONAZOLE NITRATE: 2 POWDER TOPICAL at 12:05

## 2018-12-19 RX ADMIN — BRIVARACETAM 100 MG: 10 SOLUTION ORAL at 21:33

## 2018-12-19 RX ADMIN — HYDROCORTISONE 15 MG: 5 TABLET ORAL at 12:05

## 2018-12-19 RX ADMIN — HYDROCORTISONE 10 MG: 10 TABLET ORAL at 20:50

## 2018-12-19 RX ADMIN — LEVOTHYROXINE SODIUM 137 MCG: 137 TABLET ORAL at 12:04

## 2018-12-19 RX ADMIN — BRIVARACETAM 100 MG: 10 SOLUTION ORAL at 12:05

## 2018-12-19 RX ADMIN — PIPERACILLIN SODIUM,TAZOBACTAM SODIUM 4.5 G: 4; .5 INJECTION, POWDER, FOR SOLUTION INTRAVENOUS at 09:13

## 2018-12-19 RX ADMIN — CARBAMAZEPINE 150 MG: 100 SUSPENSION ORAL at 05:54

## 2018-12-19 RX ADMIN — SODIUM CHLORIDE, POTASSIUM CHLORIDE, SODIUM LACTATE AND CALCIUM CHLORIDE: 600; 310; 30; 20 INJECTION, SOLUTION INTRAVENOUS at 10:45

## 2018-12-19 RX ADMIN — SODIUM CHLORIDE, POTASSIUM CHLORIDE, SODIUM LACTATE AND CALCIUM CHLORIDE: 600; 310; 30; 20 INJECTION, SOLUTION INTRAVENOUS at 01:37

## 2018-12-19 RX ADMIN — CARBAMAZEPINE 150 MG: 100 SUSPENSION ORAL at 18:16

## 2018-12-19 RX ADMIN — PIPERACILLIN SODIUM,TAZOBACTAM SODIUM 4.5 G: 4; .5 INJECTION, POWDER, FOR SOLUTION INTRAVENOUS at 20:49

## 2018-12-19 ASSESSMENT — ACTIVITIES OF DAILY LIVING (ADL)
ADLS_ACUITY_SCORE: 42
ADLS_ACUITY_SCORE: 42
ADLS_ACUITY_SCORE: 48
ADLS_ACUITY_SCORE: 42
ADLS_ACUITY_SCORE: 42
ADLS_ACUITY_SCORE: 44

## 2018-12-19 NOTE — PLAN OF CARE
AVSS on RA. Pt alert but non-verbal, pleasant. LS diminished. T/R q2hr. Blanchable redness to coccyx& irritation from incontinence, cleaned& mepilex changed, powder applied to groin. Right sided hemiparesis. Pt had 3x emesis in AM after changing& repositioning, currently holding TF but okay for meds per PEG per Dr. Epperson. Tele SD. IVF 50mL/hr. NPO. New condom catheter applied.  Had paged Dr. Epperson if pt still needs to be IMC.

## 2018-12-19 NOTE — PROGRESS NOTES
Spoke with IR, delaying PEG tube replacement while inpatient d/t emesis episodes this AM. Plan for tomorrow or Friday if patient doing okay.

## 2018-12-19 NOTE — PROVIDER NOTIFICATION
Dr. Epperson, hospitalist paged patient 3x vomiting episode during changing/repositioning. Tube feed currently held.    Addendum: hold TF until Dr. Epperson sees pt

## 2018-12-19 NOTE — PLAN OF CARE
Alert. Mostly nonverbal, able to follow a few simple commands. VSS ex bradycardic throughout night. No pain noted. Right sided hemiparesis. Mepilex on coccyx. Condom catheter placed with good output. Incontinent of bowels. NPO. Tube feeding off since 1900, restarting at 0700. Turn and repo. Continue IV antibiotics.

## 2018-12-19 NOTE — PLAN OF CARE
Patient is oriented to self, non verbal, up to the chair with ceiling lift, patient is here because of febrile sepsis  2/2 to aspiration pneumonia, on abx Q 6 hrs, coccyx is red and blanchable, mepilex coccyx applied, turn and reposition Q 2 hrs, incontinent of bowel and bladder, patient on tube feeding at 100 ml/hr at goal, on during the day , off at night ( 7a-7P), Tube insertion site intact, no drainage.
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
## 2018-12-20 ENCOUNTER — APPOINTMENT (OUTPATIENT)
Dept: INTERVENTIONAL RADIOLOGY/VASCULAR | Facility: CLINIC | Age: 58
DRG: 871 | End: 2018-12-20
Attending: HOSPITALIST
Payer: MEDICARE

## 2018-12-20 LAB
ALBUMIN SERPL-MCNC: 2.7 G/DL (ref 3.4–5)
ALP SERPL-CCNC: 71 U/L (ref 40–150)
ALT SERPL W P-5'-P-CCNC: 25 U/L (ref 0–70)
ANION GAP SERPL CALCULATED.3IONS-SCNC: 9 MMOL/L (ref 3–14)
AST SERPL W P-5'-P-CCNC: 19 U/L (ref 0–45)
BILIRUB SERPL-MCNC: 0.5 MG/DL (ref 0.2–1.3)
BUN SERPL-MCNC: 6 MG/DL (ref 7–30)
CALCIUM SERPL-MCNC: 8.4 MG/DL (ref 8.5–10.1)
CHLORIDE SERPL-SCNC: 99 MMOL/L (ref 94–109)
CO2 SERPL-SCNC: 25 MMOL/L (ref 20–32)
CREAT SERPL-MCNC: 0.85 MG/DL (ref 0.66–1.25)
ERYTHROCYTE [DISTWIDTH] IN BLOOD BY AUTOMATED COUNT: 13.4 % (ref 10–15)
GFR SERPL CREATININE-BSD FRML MDRD: >90 ML/MIN/{1.73_M2}
GLUCOSE SERPL-MCNC: 82 MG/DL (ref 70–99)
HCT VFR BLD AUTO: 33.5 % (ref 40–53)
HGB BLD-MCNC: 11.4 G/DL (ref 13.3–17.7)
MCH RBC QN AUTO: 29.1 PG (ref 26.5–33)
MCHC RBC AUTO-ENTMCNC: 34 G/DL (ref 31.5–36.5)
MCV RBC AUTO: 86 FL (ref 78–100)
PLATELET # BLD AUTO: 139 10E9/L (ref 150–450)
POTASSIUM SERPL-SCNC: 3.8 MMOL/L (ref 3.4–5.3)
PROT SERPL-MCNC: 7.2 G/DL (ref 6.8–8.8)
RBC # BLD AUTO: 3.92 10E12/L (ref 4.4–5.9)
SODIUM SERPL-SCNC: 133 MMOL/L (ref 133–144)
WBC # BLD AUTO: 6.9 10E9/L (ref 4–11)

## 2018-12-20 PROCEDURE — 25000128 H RX IP 250 OP 636: Performed by: HOSPITALIST

## 2018-12-20 PROCEDURE — 80053 COMPREHEN METABOLIC PANEL: CPT | Performed by: HOSPITALIST

## 2018-12-20 PROCEDURE — A9270 NON-COVERED ITEM OR SERVICE: HCPCS | Mod: GY | Performed by: HOSPITALIST

## 2018-12-20 PROCEDURE — 85027 COMPLETE CBC AUTOMATED: CPT | Performed by: HOSPITALIST

## 2018-12-20 PROCEDURE — 40000884 ZZH STATISTIC STEP DOWN HRS NIGHT

## 2018-12-20 PROCEDURE — C1769 GUIDE WIRE: HCPCS

## 2018-12-20 PROCEDURE — 25000132 ZZH RX MED GY IP 250 OP 250 PS 637: Mod: GY

## 2018-12-20 PROCEDURE — 99232 SBSQ HOSP IP/OBS MODERATE 35: CPT | Performed by: HOSPITALIST

## 2018-12-20 PROCEDURE — 27210429 ZZH NUTRITION PRODUCT INTERMEDIATE LITER

## 2018-12-20 PROCEDURE — 27210905 ZZH KIT CR7

## 2018-12-20 PROCEDURE — 36415 COLL VENOUS BLD VENIPUNCTURE: CPT | Performed by: HOSPITALIST

## 2018-12-20 PROCEDURE — A9270 NON-COVERED ITEM OR SERVICE: HCPCS | Mod: GY

## 2018-12-20 PROCEDURE — 27210815 ZZ H TUBE GASTRO CR13

## 2018-12-20 PROCEDURE — 0D2DXUZ CHANGE FEEDING DEVICE IN LOWER INTESTINAL TRACT, EXTERNAL APPROACH: ICD-10-PCS | Performed by: RADIOLOGY

## 2018-12-20 PROCEDURE — 49452 REPLACE G-J TUBE PERC: CPT

## 2018-12-20 PROCEDURE — 27210814 ZZ H TUBE GASTRO CR12

## 2018-12-20 PROCEDURE — 21400002 ZZH R&B CCU CICU CRITICAL

## 2018-12-20 PROCEDURE — 25000132 ZZH RX MED GY IP 250 OP 250 PS 637: Mod: GY | Performed by: HOSPITALIST

## 2018-12-20 RX ORDER — NICOTINE POLACRILEX 4 MG
15-30 LOZENGE BUCCAL
Status: DISCONTINUED | OUTPATIENT
Start: 2018-12-20 | End: 2018-12-24 | Stop reason: HOSPADM

## 2018-12-20 RX ORDER — DEXTROSE MONOHYDRATE 25 G/50ML
25-50 INJECTION, SOLUTION INTRAVENOUS
Status: DISCONTINUED | OUTPATIENT
Start: 2018-12-20 | End: 2018-12-24 | Stop reason: HOSPADM

## 2018-12-20 RX ADMIN — CARBAMAZEPINE 150 MG: 100 SUSPENSION ORAL at 05:58

## 2018-12-20 RX ADMIN — PIPERACILLIN SODIUM,TAZOBACTAM SODIUM 4.5 G: 4; .5 INJECTION, POWDER, FOR SOLUTION INTRAVENOUS at 20:03

## 2018-12-20 RX ADMIN — PIPERACILLIN SODIUM,TAZOBACTAM SODIUM 4.5 G: 4; .5 INJECTION, POWDER, FOR SOLUTION INTRAVENOUS at 15:24

## 2018-12-20 RX ADMIN — BRIVARACETAM 100 MG: 10 SOLUTION ORAL at 10:17

## 2018-12-20 RX ADMIN — CARBAMAZEPINE 150 MG: 100 SUSPENSION ORAL at 14:04

## 2018-12-20 RX ADMIN — BRIVARACETAM 100 MG: 10 SOLUTION ORAL at 22:01

## 2018-12-20 RX ADMIN — PIPERACILLIN SODIUM,TAZOBACTAM SODIUM 4.5 G: 4; .5 INJECTION, POWDER, FOR SOLUTION INTRAVENOUS at 10:17

## 2018-12-20 RX ADMIN — HYDROCORTISONE 10 MG: 10 TABLET ORAL at 20:03

## 2018-12-20 RX ADMIN — CARBAMAZEPINE 150 MG: 100 SUSPENSION ORAL at 18:02

## 2018-12-20 RX ADMIN — LEVOTHYROXINE SODIUM 137 MCG: 137 TABLET ORAL at 10:17

## 2018-12-20 RX ADMIN — SODIUM CHLORIDE, POTASSIUM CHLORIDE, SODIUM LACTATE AND CALCIUM CHLORIDE: 600; 310; 30; 20 INJECTION, SOLUTION INTRAVENOUS at 02:32

## 2018-12-20 RX ADMIN — CARBAMAZEPINE 150 MG: 100 SUSPENSION ORAL at 00:13

## 2018-12-20 RX ADMIN — PIPERACILLIN SODIUM,TAZOBACTAM SODIUM 4.5 G: 4; .5 INJECTION, POWDER, FOR SOLUTION INTRAVENOUS at 02:32

## 2018-12-20 RX ADMIN — HYDROCORTISONE 15 MG: 5 TABLET ORAL at 10:17

## 2018-12-20 RX ADMIN — Medication 40 MG: at 15:24

## 2018-12-20 ASSESSMENT — ACTIVITIES OF DAILY LIVING (ADL)
ADLS_ACUITY_SCORE: 46
ADLS_ACUITY_SCORE: 48
ADLS_ACUITY_SCORE: 50
ADLS_ACUITY_SCORE: 48
ADLS_ACUITY_SCORE: 46

## 2018-12-20 NOTE — PLAN OF CARE
Alert, non-verbal, follows command, R hemiplegic.  IMC, SR/SB 50's on the monitor.  Does not appear in pain/denies pain.  LLL diminished/otherwise LS clear, infrequent loose cough but no productive sputum.  BS active, meds per tube feeding, no residual, flushed w/o difficulty, otherwise clamped.  Incontinent of bladder & bowel.  Condom cath placed from previous shift slipped off, new one placed, good UO overnight.  Silicone foam over coccyx/buttocks area.  Right buttock with scabbed over area/otherwise blanchable erythema.  Plan for tube feeding replacement this morning per IR.
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
## 2018-12-20 NOTE — PROVIDER NOTIFICATION
Paged Dr. Epperson regarding pt's maroon emesis. Inquiring if pt would benefit from a PPI. Orders received.    Okay to continue tube feedings through J-tube and give meds through G-tube.

## 2018-12-20 NOTE — PLAN OF CARE
Alert. HECTOR orientation, non-verbal. Vital signs stable on room air. Assist of 2 + mechanical lift, right sided hemiparesis. Lung sounds diminished, loose infrequent cough. Bowel sounds active. Adequate urine output. Incontinent of bowel and bladder. Condom catheter in place - reapplied x2 this shift. Mepilex on coccyx, open area noted. PEG tube clamped, dressing CDI. Turn and repo q2. Denies pain and absence of non-verbal pain indicators. No emesis this shift.

## 2018-12-20 NOTE — PLAN OF CARE
Alert and able to follow commands, verbally responds to some questions, otherwise will make hand gestures. Tele NSR. On RA. Emesis x1 today, maroon color, order for protonix.  LS coarse with productive cough post emesis but pt shook his head no when asked if he was able to cough anything up, enmanuel at bedside. Pt's mother stated that he likes to be as independent as he can so if he does need to use the younker put it in his hand and encourage him to use it. GJ tube replaced down in IR, TF at goal rate in J-tube, G-tube for meds. Two large incontinent loose stools today.  Broken down area on left buttocks with scant amount of serosanguinous drainage, area has blanchable erythema. Condom catheter in place for incontinence. Repo q2hr. Prevalon boots at bedside, pt refusing to wear them

## 2018-12-20 NOTE — PROCEDURES
RADIOLOGY POST PROCEDURE NOTE    Patient name: Keyon Farias  MRN: [id number]  : 1960    Pre-procedure diagnosis: GJ tube exchange  Post-procedure diagnosis: Same    Procedure Date/Time: 2018  8:18 AM  Procedure: 18 Fr TORY GJ tube exchange.  No complications.  Ready for use, as before.  Estimated blood loss: < 5 ml  Specimen(s) collected with description:  Existing GJ tube removed.    The patient tolerated the procedure well with no immediate complications.  Significant findings:  Please see above.    See imaging dictation for procedural details.    Provider name: Brandon Villafana  Assistant(s):None

## 2018-12-20 NOTE — IR NOTE
GJ tube changed without difficulty. Patient tolerated procedure.transferred back to room accompaonied by transport aide and RN.

## 2018-12-21 ENCOUNTER — APPOINTMENT (OUTPATIENT)
Dept: GENERAL RADIOLOGY | Facility: CLINIC | Age: 58
DRG: 871 | End: 2018-12-21
Attending: HOSPITALIST
Payer: MEDICARE

## 2018-12-21 LAB
ALBUMIN UR-MCNC: 10 MG/DL
APPEARANCE UR: CLEAR
BILIRUB UR QL STRIP: NEGATIVE
COLOR UR AUTO: YELLOW
GLUCOSE UR STRIP-MCNC: NEGATIVE MG/DL
HGB UR QL STRIP: NEGATIVE
HYALINE CASTS #/AREA URNS LPF: 1 /LPF (ref 0–2)
KETONES UR STRIP-MCNC: 5 MG/DL
LACTATE BLD-SCNC: 1.9 MMOL/L (ref 0.7–2)
LACTATE BLD-SCNC: 2.7 MMOL/L (ref 0.7–2)
LEUKOCYTE ESTERASE UR QL STRIP: ABNORMAL
NITRATE UR QL: NEGATIVE
PH UR STRIP: 7 PH (ref 5–7)
PROCALCITONIN SERPL-MCNC: 0.07 NG/ML
RBC #/AREA URNS AUTO: 1 /HPF (ref 0–2)
SOURCE: ABNORMAL
SP GR UR STRIP: 1.02 (ref 1–1.03)
SQUAMOUS #/AREA URNS AUTO: 2 /HPF (ref 0–1)
UROBILINOGEN UR STRIP-MCNC: NORMAL MG/DL (ref 0–2)
WBC #/AREA URNS AUTO: 3 /HPF (ref 0–5)

## 2018-12-21 PROCEDURE — 25000132 ZZH RX MED GY IP 250 OP 250 PS 637: Mod: GY | Performed by: HOSPITALIST

## 2018-12-21 PROCEDURE — 81001 URINALYSIS AUTO W/SCOPE: CPT | Performed by: HOSPITALIST

## 2018-12-21 PROCEDURE — 25000132 ZZH RX MED GY IP 250 OP 250 PS 637: Mod: GY

## 2018-12-21 PROCEDURE — A9270 NON-COVERED ITEM OR SERVICE: HCPCS | Mod: GY

## 2018-12-21 PROCEDURE — 25000128 H RX IP 250 OP 636: Performed by: HOSPITALIST

## 2018-12-21 PROCEDURE — 71045 X-RAY EXAM CHEST 1 VIEW: CPT

## 2018-12-21 PROCEDURE — 93010 ELECTROCARDIOGRAM REPORT: CPT | Performed by: INTERNAL MEDICINE

## 2018-12-21 PROCEDURE — 87040 BLOOD CULTURE FOR BACTERIA: CPT | Performed by: HOSPITALIST

## 2018-12-21 PROCEDURE — A9270 NON-COVERED ITEM OR SERVICE: HCPCS | Mod: GY | Performed by: HOSPITALIST

## 2018-12-21 PROCEDURE — 95816 EEG AWAKE AND DROWSY: CPT

## 2018-12-21 PROCEDURE — 93005 ELECTROCARDIOGRAM TRACING: CPT

## 2018-12-21 PROCEDURE — 84145 PROCALCITONIN (PCT): CPT | Performed by: HOSPITALIST

## 2018-12-21 PROCEDURE — 21400002 ZZH R&B CCU CICU CRITICAL

## 2018-12-21 PROCEDURE — 83605 ASSAY OF LACTIC ACID: CPT | Performed by: HOSPITALIST

## 2018-12-21 PROCEDURE — 25000125 ZZHC RX 250: Performed by: HOSPITALIST

## 2018-12-21 PROCEDURE — 27210429 ZZH NUTRITION PRODUCT INTERMEDIATE LITER

## 2018-12-21 PROCEDURE — 36415 COLL VENOUS BLD VENIPUNCTURE: CPT | Performed by: HOSPITALIST

## 2018-12-21 PROCEDURE — 40000061 ZZH STATISTIC EEG TIME EA 10 MIN

## 2018-12-21 PROCEDURE — 99232 SBSQ HOSP IP/OBS MODERATE 35: CPT | Performed by: HOSPITALIST

## 2018-12-21 RX ORDER — SACCHAROMYCES BOULARDII 250 MG
250 CAPSULE ORAL 2 TIMES DAILY
Status: DISCONTINUED | OUTPATIENT
Start: 2018-12-21 | End: 2018-12-24 | Stop reason: HOSPADM

## 2018-12-21 RX ORDER — SODIUM CHLORIDE 9 MG/ML
INJECTION, SOLUTION INTRAVENOUS CONTINUOUS
Status: DISCONTINUED | OUTPATIENT
Start: 2018-12-21 | End: 2018-12-23

## 2018-12-21 RX ORDER — METOPROLOL TARTRATE 1 MG/ML
2.5-5 INJECTION, SOLUTION INTRAVENOUS EVERY 4 HOURS PRN
Status: DISCONTINUED | OUTPATIENT
Start: 2018-12-21 | End: 2018-12-24 | Stop reason: HOSPADM

## 2018-12-21 RX ORDER — LIDOCAINE 40 MG/G
CREAM TOPICAL
Status: DISCONTINUED | OUTPATIENT
Start: 2018-12-21 | End: 2018-12-23

## 2018-12-21 RX ADMIN — SODIUM CHLORIDE, POTASSIUM CHLORIDE, SODIUM LACTATE AND CALCIUM CHLORIDE: 600; 310; 30; 20 INJECTION, SOLUTION INTRAVENOUS at 02:41

## 2018-12-21 RX ADMIN — Medication 250 MG: at 20:24

## 2018-12-21 RX ADMIN — PIPERACILLIN SODIUM,TAZOBACTAM SODIUM 4.5 G: 4; .5 INJECTION, POWDER, FOR SOLUTION INTRAVENOUS at 08:29

## 2018-12-21 RX ADMIN — Medication 40 MG: at 18:01

## 2018-12-21 RX ADMIN — PIPERACILLIN SODIUM,TAZOBACTAM SODIUM 4.5 G: 4; .5 INJECTION, POWDER, FOR SOLUTION INTRAVENOUS at 14:13

## 2018-12-21 RX ADMIN — ACETAMINOPHEN 650 MG: 160 SUSPENSION ORAL at 14:16

## 2018-12-21 RX ADMIN — BRIVARACETAM 100 MG: 10 SOLUTION ORAL at 09:27

## 2018-12-21 RX ADMIN — PIPERACILLIN SODIUM,TAZOBACTAM SODIUM 4.5 G: 4; .5 INJECTION, POWDER, FOR SOLUTION INTRAVENOUS at 20:24

## 2018-12-21 RX ADMIN — SCOPALAMINE 1 PATCH: 1 PATCH, EXTENDED RELEASE TRANSDERMAL at 14:14

## 2018-12-21 RX ADMIN — PIPERACILLIN SODIUM,TAZOBACTAM SODIUM 4.5 G: 4; .5 INJECTION, POWDER, FOR SOLUTION INTRAVENOUS at 02:40

## 2018-12-21 RX ADMIN — SODIUM CHLORIDE: 9 INJECTION, SOLUTION INTRAVENOUS at 18:23

## 2018-12-21 RX ADMIN — CARBAMAZEPINE 150 MG: 100 SUSPENSION ORAL at 06:46

## 2018-12-21 RX ADMIN — LEVOTHYROXINE SODIUM 137 MCG: 137 TABLET ORAL at 08:29

## 2018-12-21 RX ADMIN — CARBAMAZEPINE 150 MG: 100 SUSPENSION ORAL at 20:24

## 2018-12-21 RX ADMIN — SODIUM CHLORIDE 250 ML: 9 INJECTION, SOLUTION INTRAVENOUS at 17:17

## 2018-12-21 RX ADMIN — HYDROCORTISONE 10 MG: 10 TABLET ORAL at 20:24

## 2018-12-21 RX ADMIN — BRIVARACETAM 100 MG: 10 SOLUTION ORAL at 20:32

## 2018-12-21 RX ADMIN — Medication 40 MG: at 08:35

## 2018-12-21 RX ADMIN — HYDROCORTISONE 15 MG: 5 TABLET ORAL at 08:29

## 2018-12-21 RX ADMIN — CARBAMAZEPINE 150 MG: 100 SUSPENSION ORAL at 14:16

## 2018-12-21 RX ADMIN — CARBAMAZEPINE 150 MG: 100 SUSPENSION ORAL at 00:19

## 2018-12-21 ASSESSMENT — ACTIVITIES OF DAILY LIVING (ADL)
ADLS_ACUITY_SCORE: 48
ADLS_ACUITY_SCORE: 46
ADLS_ACUITY_SCORE: 48
ADLS_ACUITY_SCORE: 46
ADLS_ACUITY_SCORE: 46
ADLS_ACUITY_SCORE: 48

## 2018-12-21 NOTE — PROGRESS NOTES
CLINICAL NUTRITION SERVICES - REASSESSMENT NOTE      Malnutrition:   % Weight Loss:  None noted  % Intake:  No decreased intake noted  Subcutaneous Fat Loss:  None observed  Muscle Loss:  None observed  Fluid Retention:  None noted    Malnutrition Diagnosis: Patient does not meet two of the above criteria necessary for diagnosing malnutrition         EVALUATION OF PROGRESS TOWARD GOALS   Diet:  NPO    Nutrition Support:   Isosource 1.5 at 100 mL/hr x 12 hours (7am to 7pm), providing 1800 kcal (25 kcal/kg), 82g protein (1.1 kcal/kg), 211g CHO, 18g fiber and 912mL water.   Feeding Tube Flush: 120mL before and after TF    Intake/Tolerance: Pt had an 18 Fr TORY GJ tube exchange in IR yesterday, 12/20.   He is tolerating TF without issues. BM x 2 last 24 hrs. BGM 80s, 90s.       REASSESSED NUTRITION NEEDS with new dosing weight:  Dosing Weight:  73.4 kg - 12/19  Estimated Energy Needs: 1723-2758 kcals (25-30 Kcal/Kg)  Justification: maintenance  Estimated Protein Needs:  grams protein (1.2-1.5 g pro/Kg)  Justification: preservation of lean body mass  Estimated Fluid Needs: 1755-2276 mL (1 mL/Kcal)  Justification: maintenance      NEW FINDINGS:   LR IVF @ 50 mL/hr    Admit wt was reported as 68 kg but it appears inaccurate, thus dosing weight now changed.  Vitals:    12/18/18 0207 12/19/18 0628 12/20/18 0634 12/21/18 0650   Weight: 68 kg (150 lb) 73.4 kg (161 lb 13.1 oz) 73 kg (160 lb 15 oz) 65 kg (143 lb 4.8 oz)       Previous Goals (12/18):   Initiate enteral nutrition within the next 24 hours   Evaluation: Met    Previous Nutrition Diagnosis:   Unintended weight loss related to overall medical status as evidenced by recent hospital stay and wt loss of 3.7 kg (5.2 %) over the last month   Evaluation: Completed      MALNUTRITION  % Weight Loss:  None noted  % Intake:  No decreased intake noted  Subcutaneous Fat Loss:  None observed  Muscle Loss:  None observed  Fluid Retention:  None noted    Malnutrition Diagnosis:  Patient does not meet two of the above criteria necessary for diagnosing malnutrition      CURRENT NUTRITION DIAGNOSIS  No nutrition diagnosis identified at this time    INTERVENTIONS  Recommendations / Nutrition Prescription  Continue current 12-hr daytime TF    Implementation  No intervention at this time    Goals  EN will continue to meet % of assessed needs      MONITORING AND EVALUATION:  Progress towards goals will be monitored and evaluated per protocol and Practice Guidelines    Unique Schultz, YONIS  Pager [phone] (M-F)            [phone] ([facility] Hol)

## 2018-12-21 NOTE — PLAN OF CARE
[id number]. Alert, able to follow commands nods yes and no, and thumbs up/down, non verbal.. VSS. Tele: NSR. LS coarse. +BS, +flatus. Voiding. Incontinent of both bowel and bladder. Denies pain. Use g-tube for meds. NPO. Repo q2hrs.

## 2018-12-21 NOTE — PLAN OF CARE
Pt febrile this afternoon, blood cultures, UA and lactic acid pending. EEG completed. Pt sating >95% on room air, lungs coarse, non productive cough, appears comfortable, denies pain.

## 2018-12-21 NOTE — PROVIDER NOTIFICATION
Notified MD at 1425 PM regarding changes in vital signs.      Spoke with: Dr. Epperson.    Orders were obtained.    Comments: Pt with temp of 102, orders received.
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
## 2018-12-21 NOTE — PROGRESS NOTES
[facility]    Sepsis Evaluation Progress Note    Date of Service: 12/21/2018    I was called to see Keyon Farias due to abnormal vital signs triggering the Sepsis SIRS screening alert. He is known to have an infection.     Physical Exam    Vital Signs:  Temp: 98.7  F (37.1  C) Temp src: Oral BP: (!) 82/57 Pulse: 134 Heart Rate: 130 Resp: 18 SpO2: 97 % O2 Device: None (Room air)      Lab:  Lactic Acid   Date Value Ref Range Status   12/18/2018 1.7 0.7 - 2.0 mmol/L Final     Lactate for Sepsis Protocol   Date Value Ref Range Status   12/21/2018 2.7 (H) 0.7 - 2.0 mmol/L Final       The patient is at baseline mental status.    The rest of their physical exam is significant for tachycardia.    Assessment and Plan    The SIRS and exam findings are likely due to   sepsis.     ID: The patient is currently on the following antibiotics:  Anti-infectives (From now, onward)    Start     Dose/Rate Route Frequency Ordered Stop    12/18/18 0800  piperacillin-tazobactam (ZOSYN) 4.5 g vial to attach to  mL bag      4.5 g  over 30 Minutes Intravenous EVERY 6 HOURS 12/18/18 0627          Current antibiotic coverage is appropriate for source of infection.    Fluid: Fluid bolus ordered.    Lab: Repeat lactic acid ordered for 2 hours from now.    Disposition: [place] [facility] Attending Physician, Hospitalist, was notified.    Hayden Epperson DO

## 2018-12-21 NOTE — PROVIDER NOTIFICATION
Notified Dr. Epperson of lactic of 2.7 and current blood pressure of 77/51 and HR of 127.       Dr Anderson called back. Will put pt on IMC status and start fluids.

## 2018-12-22 LAB
ANION GAP SERPL CALCULATED.3IONS-SCNC: 7 MMOL/L (ref 3–14)
BUN SERPL-MCNC: 9 MG/DL (ref 7–30)
CALCIUM SERPL-MCNC: 8.4 MG/DL (ref 8.5–10.1)
CHLORIDE SERPL-SCNC: 105 MMOL/L (ref 94–109)
CO2 SERPL-SCNC: 26 MMOL/L (ref 20–32)
CREAT SERPL-MCNC: 0.96 MG/DL (ref 0.66–1.25)
ERYTHROCYTE [DISTWIDTH] IN BLOOD BY AUTOMATED COUNT: 13.8 % (ref 10–15)
GFR SERPL CREATININE-BSD FRML MDRD: 88 ML/MIN/{1.73_M2}
GLUCOSE SERPL-MCNC: 85 MG/DL (ref 70–99)
HCT VFR BLD AUTO: 35.1 % (ref 40–53)
HGB BLD-MCNC: 11.9 G/DL (ref 13.3–17.7)
MCH RBC QN AUTO: 29.2 PG (ref 26.5–33)
MCHC RBC AUTO-ENTMCNC: 33.9 G/DL (ref 31.5–36.5)
MCV RBC AUTO: 86 FL (ref 78–100)
PLATELET # BLD AUTO: 179 10E9/L (ref 150–450)
POTASSIUM SERPL-SCNC: 3.9 MMOL/L (ref 3.4–5.3)
RBC # BLD AUTO: 4.07 10E12/L (ref 4.4–5.9)
SODIUM SERPL-SCNC: 138 MMOL/L (ref 133–144)
WBC # BLD AUTO: 11.8 10E9/L (ref 4–11)

## 2018-12-22 PROCEDURE — 21400002 ZZH R&B CCU CICU CRITICAL

## 2018-12-22 PROCEDURE — 25000125 ZZHC RX 250: Performed by: HOSPITALIST

## 2018-12-22 PROCEDURE — 80048 BASIC METABOLIC PNL TOTAL CA: CPT | Performed by: HOSPITALIST

## 2018-12-22 PROCEDURE — 25000128 H RX IP 250 OP 636: Performed by: HOSPITALIST

## 2018-12-22 PROCEDURE — 36415 COLL VENOUS BLD VENIPUNCTURE: CPT | Performed by: HOSPITALIST

## 2018-12-22 PROCEDURE — 40000885 ZZH STATISTIC STEP DOWN HRS EVENING

## 2018-12-22 PROCEDURE — 99232 SBSQ HOSP IP/OBS MODERATE 35: CPT | Performed by: INTERNAL MEDICINE

## 2018-12-22 PROCEDURE — 85027 COMPLETE CBC AUTOMATED: CPT | Performed by: HOSPITALIST

## 2018-12-22 PROCEDURE — 25000132 ZZH RX MED GY IP 250 OP 250 PS 637: Mod: GY | Performed by: HOSPITALIST

## 2018-12-22 PROCEDURE — 27210429 ZZH NUTRITION PRODUCT INTERMEDIATE LITER

## 2018-12-22 PROCEDURE — A9270 NON-COVERED ITEM OR SERVICE: HCPCS | Mod: GY

## 2018-12-22 PROCEDURE — 25000132 ZZH RX MED GY IP 250 OP 250 PS 637: Mod: GY

## 2018-12-22 PROCEDURE — 40000886 ZZH STATISTIC STEP DOWN HRS DAY

## 2018-12-22 PROCEDURE — A9270 NON-COVERED ITEM OR SERVICE: HCPCS | Mod: GY | Performed by: HOSPITALIST

## 2018-12-22 PROCEDURE — 40000884 ZZH STATISTIC STEP DOWN HRS NIGHT

## 2018-12-22 RX ADMIN — LEVOTHYROXINE SODIUM 137 MCG: 137 TABLET ORAL at 08:15

## 2018-12-22 RX ADMIN — HYDROCORTISONE 10 MG: 10 TABLET ORAL at 20:12

## 2018-12-22 RX ADMIN — CARBAMAZEPINE 150 MG: 100 SUSPENSION ORAL at 17:47

## 2018-12-22 RX ADMIN — Medication 250 MG: at 08:15

## 2018-12-22 RX ADMIN — Medication 250 MG: at 20:12

## 2018-12-22 RX ADMIN — BRIVARACETAM 100 MG: 10 SOLUTION ORAL at 09:12

## 2018-12-22 RX ADMIN — PIPERACILLIN SODIUM,TAZOBACTAM SODIUM 4.5 G: 4; .5 INJECTION, POWDER, FOR SOLUTION INTRAVENOUS at 08:13

## 2018-12-22 RX ADMIN — SCOPALAMINE 1 PATCH: 1 PATCH, EXTENDED RELEASE TRANSDERMAL at 17:01

## 2018-12-22 RX ADMIN — PIPERACILLIN SODIUM,TAZOBACTAM SODIUM 4.5 G: 4; .5 INJECTION, POWDER, FOR SOLUTION INTRAVENOUS at 02:06

## 2018-12-22 RX ADMIN — CARBAMAZEPINE 150 MG: 100 SUSPENSION ORAL at 00:17

## 2018-12-22 RX ADMIN — PIPERACILLIN SODIUM,TAZOBACTAM SODIUM 4.5 G: 4; .5 INJECTION, POWDER, FOR SOLUTION INTRAVENOUS at 20:12

## 2018-12-22 RX ADMIN — Medication 40 MG: at 06:43

## 2018-12-22 RX ADMIN — CARBAMAZEPINE 150 MG: 100 SUSPENSION ORAL at 12:03

## 2018-12-22 RX ADMIN — PIPERACILLIN SODIUM,TAZOBACTAM SODIUM 4.5 G: 4; .5 INJECTION, POWDER, FOR SOLUTION INTRAVENOUS at 14:15

## 2018-12-22 RX ADMIN — HYDROCORTISONE 15 MG: 5 TABLET ORAL at 08:15

## 2018-12-22 RX ADMIN — CARBAMAZEPINE 150 MG: 100 SUSPENSION ORAL at 06:43

## 2018-12-22 RX ADMIN — CARBAMAZEPINE 150 MG: 100 SUSPENSION ORAL at 23:33

## 2018-12-22 RX ADMIN — BRIVARACETAM 100 MG: 10 SOLUTION ORAL at 21:36

## 2018-12-22 RX ADMIN — Medication 40 MG: at 16:00

## 2018-12-22 ASSESSMENT — ACTIVITIES OF DAILY LIVING (ADL)
ADLS_ACUITY_SCORE: 48

## 2018-12-22 NOTE — PLAN OF CARE
Pt is nonverbal. No fever overnight. Lactic acid 1.9, BP soft, sinus rhythm, 96% on room air, lungs clear. Incontinent of bowel and bladder. Incontinence care done, turned and repositioned q 2hrs. Appears comfortable.

## 2018-12-22 NOTE — PLAN OF CARE
1700- 1900: Pt Alert, nonverbal. Productive cough. Remains hypotensive. Fluid bolus received. On IMC. Fluids started. Tube feed @ 100cc/hr. NPO. 1 loose stool. No UO yet, condom cath in place. Repositioned Q 2. Will continue to monitor.

## 2018-12-23 LAB
ANION GAP SERPL CALCULATED.3IONS-SCNC: 7 MMOL/L (ref 3–14)
BASOPHILS # BLD AUTO: 0.1 10E9/L (ref 0–0.2)
BASOPHILS NFR BLD AUTO: 1.2 %
BUN SERPL-MCNC: 8 MG/DL (ref 7–30)
CALCIUM SERPL-MCNC: 8.4 MG/DL (ref 8.5–10.1)
CHLORIDE SERPL-SCNC: 106 MMOL/L (ref 94–109)
CO2 SERPL-SCNC: 25 MMOL/L (ref 20–32)
CREAT SERPL-MCNC: 0.87 MG/DL (ref 0.66–1.25)
DIFFERENTIAL METHOD BLD: ABNORMAL
EOSINOPHIL # BLD AUTO: 0.6 10E9/L (ref 0–0.7)
EOSINOPHIL NFR BLD AUTO: 8.9 %
ERYTHROCYTE [DISTWIDTH] IN BLOOD BY AUTOMATED COUNT: 13.8 % (ref 10–15)
GFR SERPL CREATININE-BSD FRML MDRD: >90 ML/MIN/{1.73_M2}
GLUCOSE SERPL-MCNC: 81 MG/DL (ref 70–99)
HCT VFR BLD AUTO: 37.1 % (ref 40–53)
HGB BLD-MCNC: 12.5 G/DL (ref 13.3–17.7)
IMM GRANULOCYTES # BLD: 0 10E9/L (ref 0–0.4)
IMM GRANULOCYTES NFR BLD: 0.4 %
LYMPHOCYTES # BLD AUTO: 2.5 10E9/L (ref 0.8–5.3)
LYMPHOCYTES NFR BLD AUTO: 35.9 %
MCH RBC QN AUTO: 29.3 PG (ref 26.5–33)
MCHC RBC AUTO-ENTMCNC: 33.7 G/DL (ref 31.5–36.5)
MCV RBC AUTO: 87 FL (ref 78–100)
MONOCYTES # BLD AUTO: 0.8 10E9/L (ref 0–1.3)
MONOCYTES NFR BLD AUTO: 11.1 %
NEUTROPHILS # BLD AUTO: 3 10E9/L (ref 1.6–8.3)
NEUTROPHILS NFR BLD AUTO: 42.5 %
NRBC # BLD AUTO: 0 10*3/UL
NRBC BLD AUTO-RTO: 0 /100
PLATELET # BLD AUTO: 161 10E9/L (ref 150–450)
POTASSIUM SERPL-SCNC: 3.9 MMOL/L (ref 3.4–5.3)
RBC # BLD AUTO: 4.27 10E12/L (ref 4.4–5.9)
SODIUM SERPL-SCNC: 138 MMOL/L (ref 133–144)
WBC # BLD AUTO: 6.9 10E9/L (ref 4–11)

## 2018-12-23 PROCEDURE — A9270 NON-COVERED ITEM OR SERVICE: HCPCS | Mod: GY

## 2018-12-23 PROCEDURE — 25000132 ZZH RX MED GY IP 250 OP 250 PS 637: Mod: GY

## 2018-12-23 PROCEDURE — 27210429 ZZH NUTRITION PRODUCT INTERMEDIATE LITER

## 2018-12-23 PROCEDURE — 21400002 ZZH R&B CCU CICU CRITICAL

## 2018-12-23 PROCEDURE — A9270 NON-COVERED ITEM OR SERVICE: HCPCS | Mod: GY | Performed by: HOSPITALIST

## 2018-12-23 PROCEDURE — 25000128 H RX IP 250 OP 636: Performed by: HOSPITALIST

## 2018-12-23 PROCEDURE — 85025 COMPLETE CBC W/AUTO DIFF WBC: CPT | Performed by: INTERNAL MEDICINE

## 2018-12-23 PROCEDURE — 80048 BASIC METABOLIC PNL TOTAL CA: CPT | Performed by: INTERNAL MEDICINE

## 2018-12-23 PROCEDURE — 25000132 ZZH RX MED GY IP 250 OP 250 PS 637: Mod: GY | Performed by: HOSPITALIST

## 2018-12-23 PROCEDURE — 99232 SBSQ HOSP IP/OBS MODERATE 35: CPT | Performed by: INTERNAL MEDICINE

## 2018-12-23 PROCEDURE — 36415 COLL VENOUS BLD VENIPUNCTURE: CPT | Performed by: INTERNAL MEDICINE

## 2018-12-23 RX ORDER — AMOXICILLIN AND CLAVULANATE POTASSIUM 400; 57 MG/5ML; MG/5ML
875 POWDER, FOR SUSPENSION ORAL EVERY 12 HOURS
Status: DISCONTINUED | OUTPATIENT
Start: 2018-12-23 | End: 2018-12-24 | Stop reason: HOSPADM

## 2018-12-23 RX ADMIN — CARBAMAZEPINE 150 MG: 100 SUSPENSION ORAL at 12:00

## 2018-12-23 RX ADMIN — Medication 40 MG: at 17:23

## 2018-12-23 RX ADMIN — Medication 250 MG: at 21:13

## 2018-12-23 RX ADMIN — Medication 250 MG: at 09:42

## 2018-12-23 RX ADMIN — HYDROCORTISONE 15 MG: 5 TABLET ORAL at 09:42

## 2018-12-23 RX ADMIN — AMOXICILLIN AND CLAVULANATE POTASSIUM 875 MG: 400; 57 POWDER, FOR SUSPENSION ORAL at 10:55

## 2018-12-23 RX ADMIN — AMOXICILLIN AND CLAVULANATE POTASSIUM 875 MG: 400; 57 POWDER, FOR SUSPENSION ORAL at 21:13

## 2018-12-23 RX ADMIN — CARBAMAZEPINE 150 MG: 100 SUSPENSION ORAL at 17:23

## 2018-12-23 RX ADMIN — Medication 40 MG: at 06:34

## 2018-12-23 RX ADMIN — PIPERACILLIN SODIUM,TAZOBACTAM SODIUM 4.5 G: 4; .5 INJECTION, POWDER, FOR SOLUTION INTRAVENOUS at 03:19

## 2018-12-23 RX ADMIN — BRIVARACETAM 100 MG: 10 SOLUTION ORAL at 21:13

## 2018-12-23 RX ADMIN — CARBAMAZEPINE 150 MG: 100 SUSPENSION ORAL at 06:34

## 2018-12-23 RX ADMIN — BRIVARACETAM 100 MG: 10 SOLUTION ORAL at 09:41

## 2018-12-23 RX ADMIN — LEVOTHYROXINE SODIUM 137 MCG: 137 TABLET ORAL at 09:42

## 2018-12-23 RX ADMIN — SODIUM CHLORIDE: 9 INJECTION, SOLUTION INTRAVENOUS at 03:27

## 2018-12-23 RX ADMIN — HYDROCORTISONE 10 MG: 10 TABLET ORAL at 19:06

## 2018-12-23 ASSESSMENT — ACTIVITIES OF DAILY LIVING (ADL)
RETIRED_EATING: 4-->COMPLETELY DEPENDENT
ADLS_ACUITY_SCORE: 46
ADLS_ACUITY_SCORE: 46
COGNITION: 0 - NO COGNITION ISSUES REPORTED
DRESS: 4-->COMPLETELY DEPENDENT
TOILETING: 4-->COMPLETELY DEPENDENT
ADLS_ACUITY_SCORE: 46
AMBULATION: 4-->COMPLETELY DEPENDENT
RETIRED_COMMUNICATION: 2-->DIFFICULTY UNDERSTANDING AND SPEAKING (NOT RELATED TO LANGUAGE BARRIER)
BATHING: 4-->COMPLETELY DEPENDENT
TRANSFERRING: 4-->COMPLETELY DEPENDENT
ADLS_ACUITY_SCORE: 48
SWALLOWING: 2-->DIFFICULTY SWALLOWING LIQUIDS/FOODS
FALL_HISTORY_WITHIN_LAST_SIX_MONTHS: NO
ADLS_ACUITY_SCORE: 48
ADLS_ACUITY_SCORE: 48

## 2018-12-23 ASSESSMENT — COLUMBIA-SUICIDE SEVERITY RATING SCALE - C-SSRS
IS THE PATIENT NOT ABLE TO COMPLETE C-SSRS: UNABLE TO VERBALIZE
1. IN THE PAST MONTH, HAVE YOU WISHED YOU WERE DEAD OR WISHED YOU COULD GO TO SLEEP AND NOT WAKE UP?: OTHER (SEE COMMENTS)
2. HAVE YOU ACTUALLY HAD ANY THOUGHTS OF KILLING YOURSELF IN THE PAST MONTH?: OTHER (SEE COMMENTS)

## 2018-12-23 NOTE — PROGRESS NOTES
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    Severe sepsis suspected due to recurrent aspiration pneumonia  Multiple hospitalizations for aspiration pneumonia, most recently from 11/22 through 11/25/2018, treated broadly with antibiotics and discharged with course of amoxicillin-clavulanate PO. Admitted again 12/18 with fever, left-sided infiltrate on CXR with associated tachycardia, leukocytosis and elevated lactate.   - Improving. Stable on room air.   - Blood cultures NGTD  - Discontinue piperacillin-tazobactam IV, transition to amoxicillin-clavulanate per feeding tube  - Discontinue IMC status  - Discontinue IV fluids     History of TBI with chronic hemiplegia  Dysphagia secondary to TBI  Non-verbal. Lives with mother with nursing support.   - SLP consulted on admission, remains strict NPO  - Feeding tube replaced by IR 12/20  - Continue tube feeds per nutrition team, tolerating well  - Continue scopolamine patch for secreitions    Seizure disorder  Stable.  - EEG completed 12/21, read pending.  - Continue prior to admission brivaracetam, carbamazepine      Fever  Patient with fever on 12/21. Procalcitonin and CXR negative. UA bland. Repeat blood cultures negative.  - On antibiotics for above     Chronic normocytic anemia  Recent Labs   Lab 12/23/18  0540 12/22/18  0540 12/20/18  0650 12/19/18  0655 12/18/18  0202   HGB 12.5* 11.9* 11.4* 10.7* 11.9*      - Monitor periodically     Thrombocytopenia, mild  Martin of 105. Suspected due to sepsis.  - Normalized     Lactic acid elevation   Likely  "due to sepsis. Resolved.     Panhypopituitarism  - Continue prior to admission hydrocortisone      Hypothyroidism  - Continue prior to admission levothyroxine     Right outer buttock skin tear  Seen by wound RN on admission.  - Cares per wound RN    History of MRSA and VRE  - Contact precautions     Diet: NPO for Medical/Clinical Reasons Except for: Other; Specify: NPO For oral intake and gastric feedings for 6 hours post insertion Gastrostomy G/GJ tube. May feed through Jejunal or Distal PORT immediately for GJ tubes ONLY.  Adult Formula Drip Feeding: Specified Time Isosource 1.5; Gastrostomy; Goal Rate: 100; mL/hr; From: 7:00 AM; 7:00 PM; Medication - Tube Feeding Flush Frequency: At least 15-30 mL water before and after medication administration and with tube clogg...    DVT Prophylaxis: Pneumatic Compression Devices  Lerma Catheter: not present  Code Status: Full Code      Disposition Plan   Expected discharge: Tomorrow, recommended to prior living arrangement once stable on oral antibiotics.  Entered: Feng Cook MD 12/23/2018, 9:31 AM       The patient's care was discussed with the Patient and patient's mother (guardian) .    Feng Cook MD  Hospitalist Service  [facility]    ______________________________________________________________________    Interval History   No acute events overnight. Discussed care with bedside RN. Patient non-verbal, able to answer yes/no. Nods \"no\" to any abdominal pain, chest pain, n/v or shortness of breath.     Data reviewed today: I reviewed all medications, new labs and imaging results over the last 24 hours. I personally reviewed no images or EKG's today.    Physical Exam   Vital Signs: Temp: 97.5  F (36.4  C) Temp src: Axillary BP: 133/79   Heart Rate: 56 Resp: 10 SpO2: 100 % O2 Device: None (Room air)    Weight: 137 lbs 12.6 oz    Constitutional: Well-appearing, NAD  Respiratory: Clear to auscultation bilaterally, good air movement " bilaterally  Cardiovascular: RRR, no m/r/g. No peripheral edema.  GI: Soft, non-tender, non-distended.  Skin/Integumen: Warm, dry  Neuro: Alert. Non-verbal. Answers seemingly appropriately with nods.     Data   Recent Labs   Lab 12/23/18  0540 12/22/18  0540 12/20/18  0650  12/18/18  0202   WBC 6.9 11.8* 6.9   < > 12.8*   HGB 12.5* 11.9* 11.4*   < > 11.9*   MCV 87 86 86   < > 87    179 139*   < > 139*    138 133   < > 134   POTASSIUM 3.9 3.9 3.8   < > 4.0   CHLORIDE 106 105 99   < > 97   CO2 25 26 25   < > 31   BUN 8 9 6*   < > 14   CR 0.87 0.96 0.85   < > 0.74   ANIONGAP 7 7 9   < > 6   STEVE 8.4* 8.4* 8.4*   < > 8.5   GLC 81 85 82   < > 91   ALBUMIN  --   --  2.7*  --  3.1*   PROTTOTAL  --   --  7.2  --  7.3   BILITOTAL  --   --  0.5  --  0.4   ALKPHOS  --   --  71  --  88   ALT  --   --  25  --  22   AST  --   --  19  --  11    < > = values in this interval not displayed.       No results found for this or any previous visit (from the past 24 hour(s)).  Medications     IV fluid REPLACEMENT ONLY         amoxicillin-clavulanate  1 tablet Per Feeding Tube Q12H ALONDRA     Brivaracetam  100 mg Per Feeding Tube BID     carBAMazepine  150 mg Per Feeding Tube Q6H     hydrocortisone  10 mg Per Feeding Tube QPM     hydrocortisone  15 mg Per Feeding Tube QAM     levothyroxine  137 mcg Per Feeding Tube Daily     pantoprazole  40 mg Per Feeding Tube BID AC     saccharomyces boulardii  250 mg Per J Tube BID     scopolamine  1 patch Transdermal Q72H     scopolamine   Transdermal Q8H     scopolamine   Transdermal Q72H     sodium chloride (PF)  3 mL Intracatheter Q8H     sodium chloride (PF)  3 mL Intracatheter Q8H

## 2018-12-23 NOTE — PLAN OF CARE
Pt is alert, non verbal. Afebrile VSS, 98% on room air. Lungs diminished, sinus rhythm.  Appears comfortable. Incontinent of urine. Turned and repositioned Q 2hrs. Tube feeding infusing. Pt tolerating.
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
## 2018-12-23 NOTE — PLAN OF CARE
Pt has been afebrile, BP has rebounded, lungs diminished, occasional congested cough. Pt non verbal, appears comfortable denies pain, incontinent of bladder.

## 2018-12-24 VITALS
SYSTOLIC BLOOD PRESSURE: 115 MMHG | HEART RATE: 80 BPM | DIASTOLIC BLOOD PRESSURE: 66 MMHG | OXYGEN SATURATION: 99 % | WEIGHT: 136.69 LBS | BODY MASS INDEX: 18.54 KG/M2 | RESPIRATION RATE: 16 BRPM | TEMPERATURE: 97.9 F

## 2018-12-24 PROCEDURE — 99239 HOSP IP/OBS DSCHRG MGMT >30: CPT | Performed by: INTERNAL MEDICINE

## 2018-12-24 PROCEDURE — A9270 NON-COVERED ITEM OR SERVICE: HCPCS | Mod: GY | Performed by: HOSPITALIST

## 2018-12-24 PROCEDURE — 25000132 ZZH RX MED GY IP 250 OP 250 PS 637: Mod: GY | Performed by: HOSPITALIST

## 2018-12-24 PROCEDURE — 25000132 ZZH RX MED GY IP 250 OP 250 PS 637: Mod: GY

## 2018-12-24 PROCEDURE — A9270 NON-COVERED ITEM OR SERVICE: HCPCS | Mod: GY

## 2018-12-24 RX ORDER — AMOXICILLIN AND CLAVULANATE POTASSIUM 500; 125 MG/1; MG/1
1 TABLET, FILM COATED ORAL 3 TIMES DAILY
Qty: 9 TABLET | Refills: 0 | Status: SHIPPED | OUTPATIENT
Start: 2018-12-24 | End: 2019-01-31

## 2018-12-24 RX ADMIN — AMOXICILLIN AND CLAVULANATE POTASSIUM 875 MG: 400; 57 POWDER, FOR SUSPENSION ORAL at 09:54

## 2018-12-24 RX ADMIN — HYDROCORTISONE 15 MG: 5 TABLET ORAL at 09:45

## 2018-12-24 RX ADMIN — BRIVARACETAM 100 MG: 10 SOLUTION ORAL at 09:41

## 2018-12-24 RX ADMIN — CARBAMAZEPINE 150 MG: 100 SUSPENSION ORAL at 00:56

## 2018-12-24 RX ADMIN — Medication 40 MG: at 09:53

## 2018-12-24 RX ADMIN — Medication 250 MG: at 09:43

## 2018-12-24 RX ADMIN — CARBAMAZEPINE 150 MG: 100 SUSPENSION ORAL at 05:09

## 2018-12-24 RX ADMIN — LEVOTHYROXINE SODIUM 137 MCG: 137 TABLET ORAL at 09:45

## 2018-12-24 ASSESSMENT — ACTIVITIES OF DAILY LIVING (ADL)
ADLS_ACUITY_SCORE: 46
ADLS_ACUITY_SCORE: 44
ADLS_ACUITY_SCORE: 44

## 2018-12-24 NOTE — CONSULTS
Care Transition Initial Assessment - RN        Met with: Patient and Family, mother Savannah by phone  DATA   Active Problems:    Sepsis (H)       Cognitive Status: awake.        Contact information and PCP information verified: Yes  Lives With: parent(s)                     Insurance concerns: No Insurance issues identified  ASSESSMENT  Patient currently receives the following services:  Home RN services through [facility] (phone [phone]) --notified of discharge today, orders faxed to [phone], home therapy through [facility] ( phone [phone]) notified of discharge today and orders faxed to [phone]. Mother Savannah also has PCA to assist, she is contacting them       Identified issues/concerns regarding health management: will have medicines filled here.   MotherSavannah, ok with discharge today, prefers the [facility] to  patient at [address], patient's wheelchair is already here. I updated patient's bedside RN with discharge time. PCA name is Keyon Dover will be arriving in [employer] to  patient. Keyon will be able to transfer patient into car and place wheelchair in back of car per Savannah.   PLAN  Financial costs for the patient include per insurnace .  Patient given options and choices for discharge yes, Savannah wanted resumption of services .  Patient/family is agreeable to the plan?  Yes:   Patient anticipates discharging to home with continued community services .        Patient anticipates needs for home equipment: No  Plan/Disposition: Home   Appointments:       Care  (CTS) will continue to follow as needed.

## 2018-12-24 NOTE — PLAN OF CARE
VSS. Non verbal. Turns Q2. R Hemiplegic. mapilex on coccyx CDI.NPO. J tube clamped @ 1900 & flushed. incont of urine. Tele SR

## 2018-12-24 NOTE — PLAN OF CARE
Alert, nonverbal, calm/cooperative.  Afebrile. VSS. Room air.  LS diminished. NPO.  Tube feed to j-tube at 100 ml/hour, started at 10:30 AM.  Meds via gastric tube.  Gastric tube site w/small amount of  serosanguinous drainage.  Cleansed w/microkelnz & dressing changed.  Incontinent of urine, brief provided.  Right arm immoblie/slight contraction.  BLE w/slight contraction.  Mepilex applied to pressure ulcer on sacrum after wound care per [facility] plan of care.  Oral care provided.  Dressed patient to be picked up by PCA.  Medications and AVS provided to PCA.  Contact precautions maintained.

## 2018-12-24 NOTE — DISCHARGE SUMMARY
[facility]  Hospitalist Discharge Summary       Date of Admission:  12/18/2018  Date of Discharge:  12/24/2018  Discharging Provider: Feng Cook MD    Discharge Diagnoses   Severe sepsis suspected due to recurrent aspiration pneumonia, bilateral  History of TBI with chronic hemiplegia  Dysphagia secondary to TBI  Seizure disorder  Chronic normocytic anemia  Thrombocytopenia, mild  Lactic acid elevation   Panhypopituitarism  Hypothyroidism  Right outer buttock skin tear  History of MRSA and VRE    Follow-ups Needed After Discharge   Follow-up Appointments     Follow-up and recommended labs and tests       Follow up with primary care provider, Carlos Gomez, within 7 days for hospital follow- up.  No follow up labs or test are needed.               Unresulted Labs Ordered in the Past 30 Days of this Admission     Date and Time Order Name Status Description    12/21/2018 1428 Blood culture Preliminary     12/21/2018 1428 Blood culture Preliminary       These results will be followed up by hospitalist    Hospital Course   58 year old gentleman with TBI leading to spastic hemiplegia as well as panhypopituitarism, seizure disorder, chronic dysphagia status post PEG tube placement, and multiple episodes of sepsis due to recurrent aspiration pneumonia who presents with fever and is found to have severe sepsis suspected due to recurrent aspiration pneumonia.    Severe sepsis suspected due to recurrent aspiration pneumonia, bilateral  Multiple hospitalizations for aspiration pneumonia, most recently from 11/22 through 11/25/2018, treated broadly with antibiotics and discharged with course of amoxicillin-clavulanate PO. Admitted again 12/18 with fever, left-sided infiltrate on CXR with associated tachycardia, leukocytosis and elevated lactate.   - Blood cultures NGTD  - Stable on room air  - Treated with piperacillin-tazobactam IV, transition to amoxicillin-clavulanate per feeding tube, will continue on  discharge to complete 10 day course of antibiotics     History of TBI with chronic hemiplegia  Dysphagia secondary to TBI  Non-verbal. Lives with mother with nursing support.   - SLP consulted on admission, remains strict NPO  - Feeding tube replaced by IR 12/20  - Continue tube feeds   - Continue scopolamine patch for secretions  - Discharged back to home under mother's care with resumption of home services    Seizure disorder  Stable. EEG completed 12/21, ordered in part due to family request, read remains pending, but unlikely to  so may be followed up as outpatient.  - Continue prior to admission brivaracetam, carbamazepine      Chronic normocytic anemia  Stable. Monitor periodically.      Thrombocytopenia, mild  Martin of 105, subsequently normalized. Suspected due to sepsis.     Lactic acid elevation   Likely due to sepsis. Resolved.     Panhypopituitarism  Continue prior to admission hydrocortisone, testosterone injections.      Hypothyroidism  Continue prior to admission levothyroxine     Right outer buttock skin tear  Seen by wound RN on admission. Cares per wound RN    History of MRSA and VRE  Contact precautions     Consultations This Hospital Stay   PHARMACY TO DOSE VANCO  SPEECH LANGUAGE PATH ADULT IP CONSULT  NUTRITION SERVICES ADULT IP CONSULT  WOUND OSTOMY CONTINENCE NURSE  IP CONSULT  NUTRITION SERVICES ADULT IP CONSULT  PHARMACY IP CONSULT  PHARMACY IP CONSULT  SOCIAL WORK IP CONSULT    Code Status   Full Code    Time Spent on this Encounter   I, Feng Cook, personally saw the patient today and spent greater than 30 minutes discharging this patient.       Feng Cook MD  [facility]  ______________________________________________________________________    Physical Exam   Vital Signs: Temp: 97.9  F (36.6  C) Temp src: Axillary BP: 115/66 Pulse: 80 Heart Rate: 64 Resp: 16 SpO2: 99 % O2 Device: None (Room air)    Weight: 136 lbs 10.96 oz    Constitutional:  Well-appearing, NAD  Respiratory:     Clear to auscultation bilaterally, good air movement bilaterally  Cardiovascular: RRR. No peripheral edema.  GI: Soft, non-tender, non-distended.  Skin/Integumen: Warm, dry  Neuro: Alert. Non-verbal. Answers seemingly appropriately with nods.          Primary Care Physician   Carlos Gomez    Discharge Disposition   Discharged to home  Condition at discharge: Stable      Discharge Orders      Home care nursing referral      Home infusion referral      Discharge Instructions    If questions or problems arise regarding tube function (e.g. leaking, dislodges, etc.) Contact Interventional Radiology department 24 hours a day.    For procedures that were done at [facility]:    8 AM - 4 PM Monday through Friday Contact the Nurse Line [phone]  For afterhours and weekends [phone]    Ask for the Interventional Radiologist-on call.     For procedures that were done at [facility]:  8 AM - 4 PM Monday through Friday Contact   [phone]  For afterhours and weekends: [phone]   Ask for the Interventional Radiologist on call.       IF DIRECTED by the RADIOLOGIST, related to specific problems with the tube functioning,  go to the Emergency Department.     Reason for your hospital stay    You were hospitalized for pneumonia.     Follow-up and recommended labs and tests     Follow up with primary care provider, Carlos Gomez, within 7 days for hospital follow- up.  No follow up labs or test are needed.     Activity    Your activity upon discharge: activity as tolerated     Full Code    Per previous documentation.     Diet    Follow this diet upon discharge: Orders Placed This Encounter  NPO        Adult Formula Drip Feeding: Specified Time Isosource 1.5; Gastrostomy; Goal Rate: 100; mL/hr; From: 7:00 AM; 7:00 PM; Medication - Tube Feeding Flush Frequency: At least 15-30 mL water before and after medication administration and with tube clogg...     Discharge  Medications   Current Discharge Medication List      CONTINUE these medications which have CHANGED    Details   amoxicillin-clavulanate (AUGMENTIN) 500-125 MG tablet 1 tablet by Per G Tube route 3 times daily for 3 days  Qty: 9 tablet, Refills: 0    Associated Diagnoses: Aspiration pneumonia of right lung, unspecified aspiration pneumonia type, unspecified part of lung (H)         CONTINUE these medications which have NOT CHANGED    Details   acetaminophen (TYLENOL) 500 MG tablet 1,000 mg by Oral or FT or NG tube route every 6 hours as needed for mild pain      bacitracin ointment Apply topically daily as needed for wound care To PEG site.      Brivaracetam (BRIVIACT) 10 MG/ML solution 100 mg by Oral or FT or NG tube route 2 times daily @0900 and 2100      calcium carbonate 1250 (500 CA) MG/5ML SUSP suspension 5 mLs (1,250 mg) by Per J Tube route 3 times daily (with meals)  Qty: 450 mL    Associated Diagnoses: Malnutrition (H)      carBAMazepine (TEGRETOL) 100 MG/5ML suspension Take 150 mg by mouth every 6 hours At 06:00, 12:00, 18:00 and 24:00 for seizures      !! hydrocortisone (CORTEF) 5 MG tablet 10 mg by Oral or FT or NG tube route every evening 2 tablets x 5mg=10mg      !! hydrocortisone (CORTEF) 5 MG tablet 20 mg by Oral or FT or NG tube route every morning 3 tablets x 5mg=15mg       levothyroxine (SYNTHROID/LEVOTHROID) 137 MCG tablet 137 mcg by Oral or FT or NG tube route daily      melatonin (MELATONIN) 1 MG/ML LIQD liquid 6 mg by Jejunal Tube route At Bedtime       Multiple Vitamins-Minerals (CENTRUM SILVER) per tablet Take 1 tablet by mouth daily Crush and feed via j-tube      pantoprazole (PROTONIX) 2 mg/mL SUSP suspension 40 mg by Per NG tube route daily       potassium & sodium phosphates (NEUTRA-PHOS) 280-160-250 MG Packet Take 1 packet by mouth 3 times daily 0900, 1500, 2100.       saccharomyces boulardii (FLORASTOR) 250 MG capsule 1 capsule (250 mg) by Oral or Feeding Tube route 2 times daily  Qty:  60 capsule, Refills: 0    Associated Diagnoses: Altered bowel function      scopolamine (TRANSDERM-SCOP, 1.5 MG,) 72 hr patch Place 1 patch onto the skin every 72 hours      testosterone cypionate (DEPOTESTOTERONE CYPIONATE) 200 MG/ML injection Inject 76 mg into the muscle See Admin Instructions Every 2 weeks.  76 mg or 0.38 mL      Vitamin D, Cholecalciferol, 1000 units TABS Take 2 tablets by mouth every morning      Wheat Dextrin (BENEFIBER PO) Take 2 teaspoonful by mouth daily       albuterol (2.5 MG/3ML) 0.083% neb solution Take 1 vial by nebulization every 4 hours as needed for shortness of breath / dyspnea or wheezing       !! - Potential duplicate medications found. Please discuss with provider.          Significant Results and Procedures   Most Recent 3 CBC's:  Recent Labs   Lab Test 12/23/18  0540 12/22/18  0540 12/20/18  0650   WBC 6.9 11.8* 6.9   HGB 12.5* 11.9* 11.4*   MCV 87 86 86    179 139*     Most Recent 3 BMP's:  Recent Labs   Lab Test 12/23/18  0540 12/22/18  0540 12/20/18  0650    138 133   POTASSIUM 3.9 3.9 3.8   CHLORIDE 106 105 99   CO2 25 26 25   BUN 8 9 6*   CR 0.87 0.96 0.85   ANIONGAP 7 7 9   STEVE 8.4* 8.4* 8.4*   GLC 81 85 82     Most Recent 2 LFT's:  Recent Labs   Lab Test 12/20/18  0650 12/18/18  0202   AST 19 11   ALT 25 22   ALKPHOS 71 88   BILITOTAL 0.5 0.4     Most Recent 3 INR's:  Recent Labs   Lab Test 02/07/17  0452 01/30/17  0340 01/25/17  0426   INR 1.27* 1.32* 1.49*     Most Recent 3 Troponin's:  Recent Labs   Lab Test 01/22/17  0500 01/21/17  2348 01/21/17  1600   TROPI 0.017 0.025 0.028     Most Recent 3 BNP's:No lab results found.  Most Recent Cholesterol Panel:  Recent Labs   Lab Test 11/29/16  0430   TRIG 100     Most Recent 6 Bacteria Isolates From Any Culture (See EPIC Reports for Culture Details):  Recent Labs   Lab Test 12/21/18  1527 12/21/18  1519 12/18/18  0225 12/18/18  0200 11/22/18  0157 11/22/18  0151   CULT No growth after 3 days No growth after 3  days No growth No growth  No growth No growth No growth     Most Recent TSH and T4:  Recent Labs   Lab Test 07/05/18  0021   TSH <0.01*   T4 1.66*     Most Recent Hemoglobin A1c:  Recent Labs   Lab Test 11/22/18  1438   A1C 6.3*     Most Recent Urinalysis:  Recent Labs   Lab Test 12/21/18  2145  10/03/17  2224   COLOR Yellow   < > Yellow   APPEARANCE Clear   < > Clear   URINEGLC Negative   < > Negative   URINEBILI Negative   < > Negative   URINEKETONE 5*   < > Negative   SG 1.024   < > 1.015   UBLD Negative   < > Negative   URINEPH 7.0   < > 7.5*   PROTEIN 10*   < > 30*   UROBILINOGEN  --   --  0.2   NITRITE Negative   < > Negative   LEUKEST Small*   < > Negative   RBCU 1   < > O - 2   WBCU 3   < > O - 2    < > = values in this interval not displayed.     Most Recent ABG:  Recent Labs   Lab Test 02/15/18  0020   PH 7.39   PO2 61*   PCO2 44   HCO3 26   KIRTI 0.8     Most Recent ESR & CRP:  Recent Labs   Lab Test 02/06/17  0228   CRP 87.0*   ,   Results for orders placed or performed during the hospital encounter of 12/18/18   XR Chest 2 Views    Narrative    XR CHEST 2 VW  12/18/2018 2:29 AM     INDICATION: Fever, frequent PNA.    COMPARISON: 11/22/2018.      Impression    IMPRESSION: Shallow inspiration. Minimal patchy opacity left lower  lung compatible with atelectasis or minimal infiltrate. Previously  seen right perihilar infiltrate has resolved. Heart size is normal.    PANFILO KING MD   CT Chest w Contrast    Narrative    PROCEDURE:  CT of of the chest with contrast    DATE OF PROCEDURE:  12/18/2018 9:56 AM    CLINICAL HISTORY/INDICATION:  Pneumonia.    COMPARISON:  CT 9/18/2018    TECHNIQUE:  CT of the chest was performed following the administration of  intravenous contrast. Coronal reformats and MIPS were performed.  Radiation dose for this scan was reduced using automated exposure  control, adjustment of the mA and/or kV according to patient size, or  iterative reconstruction technique.    DLP:  412  mGycm    FINDINGS:  Diffuse groundglass opacities with bilateral lower lobe consolidation,  improvement in middle lobe consolidation and lower lobe consolidation  since September 2018. No large pulmonary nodules.    Thyroid gland is unremarkable. The heart is not enlarged. Trace  pericardial effusion. No pleural effusion, no pneumothorax.    Visualized portions of the liver, spleen, adrenal glands and stomach  are unremarkable. The gallbladder is contracted.      Impression    IMPRESSION:  1.  Persistent but improving bilateral lower and right middle lobe  consolidations with diffuse groundglass opacities.  2.  Resolved trace pleural effusions.    SISI ROBERTS MD   IR Gastro Jejunostomy Tube Change    Narrative    IR GASTRO JEJUNOSTOMY TUBE CHANGE 12/20/2018 8:16 AM    HISTORY: 58-year-old patient with long-standing gastrojejunostomy  tube, request made for exchange.    TECHNIQUE: Patient was brought to the interventional radiology  department. Informed consent obtained with patient's mother. Patient  was placed in a supine position. The existing gastrojejunostomy tube  and surrounding skin were prepped and draped in standard sterile  fashion. Contrast was injected through the tube to confirm appropriate  position. A stiff angled Glidewire was advanced into the jejunal lumen  of the tube, balloon deflated, tube removed. A new 18 [place] TORY  gastrojejunostomy tube was placed over the Glidewire. Balloon was  inflated and secured in position. Contrast was injected to confirm  appropriate position.    Sedation: None  Fluoroscopic time: 0.3 minutes  Total fluoroscopic dose: 2.1 mg  Contrast: 15 mL of Omnipaque 240 administered into the enteric tract  without complication.  Local anesthetic: None    FINDINGS: Spot fluoroscopic image confirms appropriate placement of 18  [place] TORY gastrojejunostomy tube.      Impression    IMPRESSION: Uncomplicated exchange of 18 [place] TORY gastrojejunostomy  tube. The tube is ready  for immediate use.    EDER SINHA MD   XR Chest Port 1 View    Narrative    CHEST PORTABLE ONE VIEW  12/21/2018 4:02 PM     HISTORY: Shortness of breath, fever.    COMPARISON: December 18, 2018      Impression    IMPRESSION: Persistent shallow inspiration. Scattered bilateral  pulmonary opacities, basilar predominant, relatively unchanged. No  obvious pleural effusion and no pneumothorax.    EDER SINHA MD       Allergies   Allergies   Allergen Reactions     Dilantin [Phenytoin Sodium]      Valproic Acid      Toxicity c bone marrow suspension, elevated ammonia levels

## 2018-12-26 ENCOUNTER — TELEPHONE (OUTPATIENT)
Dept: CASE MANAGEMENT | Facility: CLINIC | Age: 58
End: 2018-12-26

## 2018-12-26 NOTE — TELEPHONE ENCOUNTER
[facility] [place] [facility], ([phone]), they are unable to accept patient back to their agency. Patient was not a resumption of services and had closed in September. I called patient's mother Savannah and explained. She would like referral for Home PT/OT to go to [facility]. Will discuss with discharging MD to coordinate orders.

## 2018-12-28 LAB — INTERPRETATION ECG - MUSE: NORMAL

## 2019-01-08 NOTE — IP AVS SNAPSHOT
MRN:[id number]                      After Visit Summary   5/14/2018    Keyon Farias    MRN: [id number]           Visit Information        Department      5/14/2018 12:28 PM [facility]          Review of your medicines      UNREVIEWED medicines. Ask your doctor about these medicines        Dose / Directions    ACETAMINOPHEN PO        Dose:  650 mg   650 mg by Per Feeding Tube route every 4 hours as needed for pain   Refills:  0       albuterol (2.5 MG/3ML) 0.083% neb solution        Dose:  1 vial   Take 1 vial by nebulization every 4 hours as needed for shortness of breath / dyspnea or wheezing   Refills:  0       amoxicillin-clavulanate 875-125 MG per tablet   Commonly known as:  AUGMENTIN   Used for:  Aspiration pneumonia, unspecified aspiration pneumonia type, unspecified laterality, unspecified part of lung (H)        Dose:  1 tablet   1 tablet by Per J Tube route 2 times daily   Quantity:  12 tablet   Refills:  0       aspirin 10 mg/mL Susp   Used for:  Routine adult health maintenance        Dose:  81 mg   8.1 mLs (81 mg) by Per J Tube route daily   Refills:  0       bacitracin ointment        Apply topically daily as needed for wound care To PEG site.   Refills:  0       bisacodyl 10 MG Suppository   Commonly known as:  DULCOLAX   Used for:  Constipation, unspecified constipation type        Dose:  10 mg   Place 1 suppository (10 mg) rectally daily as needed for constipation   Quantity:  30 suppository   Refills:  0       BRIVIACT 10 MG/ML solution   Generic drug:  Brivaracetam        Dose:  100 mg   Take 100 mg by mouth 2 times daily   Refills:  0       calcium carbonate 1250 (500 Ca) MG/5ML Susp suspension   Used for:  Malnutrition (H)        Dose:  1250 mg   5 mLs (1,250 mg) by Per J Tube route 3 times daily (with meals)   Quantity:  450 mL   Refills:  0       carBAMazepine 100 MG/5ML suspension   Commonly known as:  TEGretol        Dose:  150 mg   Take 150 mg by mouth  every 6 hours   Refills:  0       fiber modular packet   Used for:  Necrotizing pancreatitis        Dose:  1 packet   1 packet by Per Feeding Tube route 3 times daily   Refills:  0       * hydrocortisone 2 mg/mL Susp   Commonly known as:  CORTEF   Used for:  Panhypopituitarism (H)        Dose:  20 mg   10 mLs (20 mg) by Per J Tube route every morning   Refills:  0       * hydrocortisone 2 mg/mL Susp   Commonly known as:  CORTEF   Used for:  Panhypopituitarism (H)        Dose:  10 mg   Take 5 mLs (10 mg) by mouth every evening   Refills:  0       levothyroxine 25 mcg/mL Susp   Commonly known as:  SYNTHROID   Used for:  Panhypopituitarism (H)        Dose:  150 mcg   6 mLs (150 mcg) by Per J Tube route every morning (before breakfast)   Refills:  0       melatonin 1 MG/ML Liqd liquid        Dose:  6 mg   6 mg by Jejunal Tube route nightly as needed for sleep   Refills:  0       multivitamins with minerals Liqd liquid   Used for:  Necrotizing pancreatitis, Malnutrition (H)        Dose:  15 mL   15 mLs by Per J Tube route daily   Refills:  0       * pantoprazole Susp suspension   Commonly known as:  PROTONIX        Dose:  10 mg   Take 10 mg by mouth daily   Refills:  0       * pantoprazole Susp suspension   Commonly known as:  PROTONIX        Dose:  10 mg   Take 10 mg by mouth every evening   Refills:  0       potassium & sodium phosphates 280-160-250 MG Packet   Commonly known as:  NEUTRA-PHOS        Dose:  1 packet   Take 1 packet by mouth 3 times daily 0900, 1500, 2100.   Refills:  0       testosterone cypionate 200 MG/ML injection   Commonly known as:  DEPOTESTOTERONE        Dose:  100 mg   Inject 100 mg into the muscle See Admin Instructions Every 2 weeks.   Refills:  0       VITAMIN D (CHOLECALCIFEROL) PO        Dose:  4000 Units   Take 4,000 Units by mouth   Refills:  0       * Notice:  This list has 4 medication(s) that are the same as other medications prescribed for you. Read the directions carefully, and ask  your doctor or other care provider to review them with you.             Protect others around you: Learn how to safely use, store and throw away your medicines at www.disposemymeds.org.         Follow-ups after your visit         Care Instructions        Further instructions from your care team       G-tube Exchange Discharge Instructions     After you go home:      You may resume your normal diet    Care of Insertion Site:      For the first 48 hrs, check your puncture site every couple hours while you are awake     You may remove/change the dressing tomorrow    You may shower tomorrow    No tub baths, whirlpools or swimming until your puncture site has fully healed     Activity       You may go back to normal activity in 24 hours    Wait 48 hours before lifting, straining, exercise or other strenuous activity    Medicines:      You may resume all medications    Resume your Warfarin/Coumadin at your regular dose today. Follow up with your provider to have your INR rechecked    Resume your Platelet Inhibitors and Aspirin tomorrow at your regular dose    For minor pain, you may take Acetaminophen (Tylenol) or Ibuprofen (Advil)                 Call the provider who ordered this procedure if:      Blood or fluid is draining from the site    The site is red, swollen, hot, tender or there is foul-smelling drainage    Chills or a fever greater than 101 F (38 C)    Increased pain at the site    Any questions or concerns    Call  911 or go to the Emergency Room if:      Severe pain or trouble breathing    Bleeding that you cannot control      If you have questions call:          [facility] Radiology Dept @ [phone]        The provider who performed your procedure was _________________.        Caring for your G-tube    Tube Maintenance:    Possible problems with your tube may include:      Clogged with medications or feedings - most obstructions can be cleared with a small (3cc) syringe and warm water. This may  be repeated until the tube is unclogged. This can be prevented by frequently flushing the tube with water (60cc) during the day and always after medications & feedings.      Tube pulls out or falls out -cover the opening with gauze & tape. Call [phone] for further instructions      Skin breakdown and/or yeast infections at the insertion site - use of skin barrier ointments and anti-fungal powders can treat most site irritations.  Ask your pharmacist or provider for assistance (a prescription is not necessary).    In general, tube problems (including pulled tubes) are NOT emergency situations. Unless the pulling out of a tube is accompanied by uncontrollable bleeding, please DO NOT GO TO THE EMERGENCY ROOM!  Call [phone] with problems.    Tube Care:      Change the gauze dressing every 24 hours and if soiled (dirty).  Stabilize all tubes securely by using gauze and tape.  Clean tube site with soap & water using a cotton applicator (Q-tip) as needed to prevent irritation.    Flush feeding tube with 60cc of warm or room temperature water before and after meds.  To prevent the tube from clogging, ask your provider or pharmacist if liquid forms of your medications are available. If not, crush the pills well & be sure to flush the tube before & after all medications.    Flush feeding tube a minimum of every 4 hours and when feeding is completed with 60cc of water to keep the tube clear and avoid clogging.    Pt may use an abdominal (waist) binder to protect the G-tube.    If there is continued oozing or bleeding, redness, yellow/green/foul smelling drainage    STOP the feedings & use of the tube immediately if there is:      Continued oozing or bleeding at the site    Redness    Yellow/green or foul smelling drainage at the site    Uncontrolled stomach pain    Many of the supplies mentioned above can be purchased at your local pharmacy      For issues with your tube, please call:    [facility]  "Radiology Dept at [phone]               Additional Information About Your Visit        MyChart Information     [facility] lets you send messages to your doctor, view your test results, renew your prescriptions, schedule appointments and more. To sign up, go to [email or web address] . Click on \"Log in\" on the left side of the screen, which will take you to the Welcome page. Then click on \"Sign up Now\" on the right side of the page.     You will be asked to enter the access code listed below, as well as some personal information. Please follow the directions to create your username and password.     Your access code is: [email or web address]  Expires: 2018  2:31 PM     Your access code will  in 90 days. If you need help or a new code, please call your [facility] clinic or [phone].        Care EveryWhere ID     This is your Care EveryWhere ID. This could be used by other organizations to access your [facility] medical records  [id number]        Your Vitals Were     Blood Pressure Temperature Respirations Pulse Oximetry          108/64 (BP Location: Left arm) 96.3  F (35.7  C) (Axillary) 18 100%         Primary Care Provider Office Phone # Fax #    Carlos Gomez [phone]      Equal Access to Services     [facility]: Hadii timi ku hadasho Soomaali, waaxda luqadaha, qaybta kaalmada adeegyada, yaw moreland . So [facility] [phone].    ATENCIÓN: Si habla español, tiene a king disposición servicios gratuitos de asistencia lingüística. Llame al [phone].    We comply with applicable federal civil rights laws and [place] laws. We do not discriminate on the basis of race, color, national origin, age, disability, sex, sexual orientation, or gender identity.            Thank you!     Thank you for choosing [facility] for your care. Our goal is always to provide you with excellent care. Hearing back from our patients is one way we can continue to improve our services. " Please take a few minutes to complete the written survey that you may receive in the mail after you visit with us. Thank you!             Medication List: This is a list of all your medications and when to take them. Check marks below indicate your daily home schedule. Keep this list as a reference.      Medications           Morning Afternoon Evening Bedtime As Needed    ACETAMINOPHEN PO   650 mg by Per Feeding Tube route every 4 hours as needed for pain                                albuterol (2.5 MG/3ML) 0.083% neb solution   Take 1 vial by nebulization every 4 hours as needed for shortness of breath / dyspnea or wheezing                                amoxicillin-clavulanate 875-125 MG per tablet   Commonly known as:  AUGMENTIN   1 tablet by Per J Tube route 2 times daily                                aspirin 10 mg/mL Susp   8.1 mLs (81 mg) by Per J Tube route daily                                bacitracin ointment   Apply topically daily as needed for wound care To PEG site.                                bisacodyl 10 MG Suppository   Commonly known as:  DULCOLAX   Place 1 suppository (10 mg) rectally daily as needed for constipation                                BRIVIACT 10 MG/ML solution   Take 100 mg by mouth 2 times daily   Generic drug:  Brivaracetam                                calcium carbonate 1250 (500 Ca) MG/5ML Susp suspension   5 mLs (1,250 mg) by Per J Tube route 3 times daily (with meals)                                carBAMazepine 100 MG/5ML suspension   Commonly known as:  TEGretol   Take 150 mg by mouth every 6 hours                                fiber modular packet   1 packet by Per Feeding Tube route 3 times daily                                * hydrocortisone 2 mg/mL Susp   Commonly known as:  CORTEF   10 mLs (20 mg) by Per J Tube route every morning                                * hydrocortisone 2 mg/mL Susp   Commonly known as:  CORTEF   Take 5 mLs (10 mg) by mouth every evening                                 levothyroxine 25 mcg/mL Susp   Commonly known as:  SYNTHROID   6 mLs (150 mcg) by Per J Tube route every morning (before breakfast)                                melatonin 1 MG/ML Liqd liquid   6 mg by Jejunal Tube route nightly as needed for sleep                                multivitamins with minerals Liqd liquid   15 mLs by Per J Tube route daily                                * pantoprazole Susp suspension   Commonly known as:  PROTONIX   Take 10 mg by mouth daily                                * pantoprazole Susp suspension   Commonly known as:  PROTONIX   Take 10 mg by mouth every evening                                potassium & sodium phosphates 280-160-250 MG Packet   Commonly known as:  NEUTRA-PHOS   Take 1 packet by mouth 3 times daily 0900, 1500, 2100.                                testosterone cypionate 200 MG/ML injection   Commonly known as:  DEPOTESTOTERONE   Inject 100 mg into the muscle See Admin Instructions Every 2 weeks.                                VITAMIN D (CHOLECALCIFEROL) PO   Take 4,000 Units by mouth                                * Notice:  This list has 4 medication(s) that are the same as other medications prescribed for you. Read the directions carefully, and ask your doctor or other care provider to review them with you.       DISPLAY PLAN FREE TEXT

## 2019-01-09 DIAGNOSIS — R56.9 SEIZURES (H): Primary | ICD-10-CM

## 2019-01-09 DIAGNOSIS — J69.0 RECURRENT ASPIRATION PNEUMONIA (H): ICD-10-CM

## 2019-01-09 DIAGNOSIS — S06.9X9S TRAUMATIC BRAIN INJURY WITH LOSS OF CONSCIOUSNESS, SEQUELA (H): ICD-10-CM

## 2019-01-10 ENCOUNTER — PATIENT OUTREACH (OUTPATIENT)
Dept: CARE COORDINATION | Facility: CLINIC | Age: 59
End: 2019-01-10

## 2019-01-11 ASSESSMENT — ACTIVITIES OF DAILY LIVING (ADL)
DEPENDENT_IADLS:: CLEANING;COOKING;LAUNDRY;SHOPPING;MEAL PREPARATION;MEDICATION MANAGEMENT;MONEY MANAGEMENT;TRANSPORTATION

## 2019-01-11 NOTE — PROGRESS NOTES
Clinic Care Coordination Contact    Clinic Care Coordination Contact  OUTREACH    Referral Information:  Referral Source: Other, specify(Scheduled Provider)    Primary Diagnosis: Psychosocial    Chief Complaint   Patient presents with     Clinic Care Coordination - Initial     Provider referral request to Complex Care Clinic        [place] Utilization:   Clinic Utilization  Difficulty keeping appointments:: No  Compliance Concerns: No  No-Show Concerns: No  No PCP office visit in Past Year: No  Utilization    Last refreshed: 1/10/2019  8:23 PM:  Hospital Admissions 10           Last refreshed: 1/10/2019  8:23 PM:  ED Visits 3           Last refreshed: 1/10/2019  8:23 PM:  No Show Count (past year) 1              Current as of: 1/10/2019  8:23 PM              Clinical Concerns:  SW received referral from provider who was scheduled to see patient to establish primary care.  D/t pt complex care needs, it was in the opinion of the provider and clinic to refer pt to a Complex Care Clinic.      SW reached out to pt mom, guardian, Savannah to discuss pt care needs and recommendations from scheduled provider to follow up with Complex Care Clinic.  CHE called [facility] as well as [facility] to inquire of Care Coordination/contacts to refer pt/family to.     Pt family requested SW cancel the apt scheduled with provider here at [facility] so she could reschedule and establish with [facility].  SW provided contacts for both complex care clinics as well as care coordinator information.  Lastly CHE offered this [facility] contact in case transition and establishing with [facility] presents confusion/issues I can help to work through.      CHE called scheduling line to cancel apt on pt/family behalf.         Resources and Interventions:  Community Resources: None  Supplies used at home:: None  Equipment Currently Used at Home: grab bar, toilet, grab bar, tub/shower, tub bench,  wheelchair, manual    Advance Care Plan/Directive  Advanced Care Plans/Directives on file:: Yes  Type Advanced Care Plans/Directives: Advanced Directive - On File    Referrals Placed: Complex Care Clinic    Patient/Caregiver understanding: Pt reports understanding and denies any additional questions or concerns at this times. SW CC engaged in [facility] communication during encounter.    Plan: Pt/family to schedule apt with Complex Care Clinic and outreach to SW as needed.  No further outreaches will be made at this time unless a new referral is made or a change in the pt's status occurs. Patient was provided with this writer's contact information and encouraged to call with any questions or concerns.      Sammy Markham, hospitals  Clinic Care Coordinator  [facility]  [phone]  [email or web address]

## 2019-01-14 ENCOUNTER — APPOINTMENT (OUTPATIENT)
Dept: GENERAL RADIOLOGY | Facility: CLINIC | Age: 59
DRG: 178 | End: 2019-01-14
Payer: MEDICARE

## 2019-01-14 ENCOUNTER — HOSPITAL ENCOUNTER (INPATIENT)
Facility: CLINIC | Age: 59
LOS: 3 days | Discharge: HOME HEALTH | DRG: 178 | End: 2019-01-17
Attending: EMERGENCY MEDICINE | Admitting: INTERNAL MEDICINE
Payer: MEDICARE

## 2019-01-14 DIAGNOSIS — R65.20 SEVERE SEPSIS (H): ICD-10-CM

## 2019-01-14 DIAGNOSIS — A41.9 SEVERE SEPSIS (H): ICD-10-CM

## 2019-01-14 DIAGNOSIS — J18.9 HCAP (HEALTHCARE-ASSOCIATED PNEUMONIA): Primary | ICD-10-CM

## 2019-01-14 LAB
ALBUMIN SERPL-MCNC: 3 G/DL (ref 3.4–5)
ALBUMIN UR-MCNC: 10 MG/DL
ALP SERPL-CCNC: 99 U/L (ref 40–150)
ALT SERPL W P-5'-P-CCNC: 30 U/L (ref 0–70)
ANION GAP SERPL CALCULATED.3IONS-SCNC: 7 MMOL/L (ref 3–14)
APPEARANCE UR: ABNORMAL
AST SERPL W P-5'-P-CCNC: 17 U/L (ref 0–45)
BACTERIA #/AREA URNS HPF: ABNORMAL /HPF
BASOPHILS # BLD AUTO: 0.1 10E9/L (ref 0–0.2)
BASOPHILS NFR BLD AUTO: 0.5 %
BILIRUB SERPL-MCNC: 0.3 MG/DL (ref 0.2–1.3)
BILIRUB UR QL STRIP: NEGATIVE
BUN SERPL-MCNC: 20 MG/DL (ref 7–30)
CALCIUM SERPL-MCNC: 8.5 MG/DL (ref 8.5–10.1)
CHLORIDE SERPL-SCNC: 95 MMOL/L (ref 94–109)
CO2 SERPL-SCNC: 30 MMOL/L (ref 20–32)
COLOR UR AUTO: YELLOW
CREAT SERPL-MCNC: 0.85 MG/DL (ref 0.66–1.25)
DIFFERENTIAL METHOD BLD: ABNORMAL
EOSINOPHIL # BLD AUTO: 0.4 10E9/L (ref 0–0.7)
EOSINOPHIL NFR BLD AUTO: 3.3 %
ERYTHROCYTE [DISTWIDTH] IN BLOOD BY AUTOMATED COUNT: 13.5 % (ref 10–15)
GFR SERPL CREATININE-BSD FRML MDRD: >90 ML/MIN/{1.73_M2}
GLUCOSE SERPL-MCNC: 147 MG/DL (ref 70–99)
GLUCOSE UR STRIP-MCNC: 70 MG/DL
HCT VFR BLD AUTO: 33.6 % (ref 40–53)
HGB BLD-MCNC: 11.2 G/DL (ref 13.3–17.7)
HGB UR QL STRIP: NEGATIVE
IMM GRANULOCYTES # BLD: 0 10E9/L (ref 0–0.4)
IMM GRANULOCYTES NFR BLD: 0.2 %
KETONES UR STRIP-MCNC: NEGATIVE MG/DL
LACTATE BLD-SCNC: 2.3 MMOL/L (ref 0.7–2)
LEUKOCYTE ESTERASE UR QL STRIP: ABNORMAL
LYMPHOCYTES # BLD AUTO: 2.8 10E9/L (ref 0.8–5.3)
LYMPHOCYTES NFR BLD AUTO: 20.9 %
MCH RBC QN AUTO: 28.3 PG (ref 26.5–33)
MCHC RBC AUTO-ENTMCNC: 33.3 G/DL (ref 31.5–36.5)
MCV RBC AUTO: 85 FL (ref 78–100)
MONOCYTES # BLD AUTO: 1 10E9/L (ref 0–1.3)
MONOCYTES NFR BLD AUTO: 7.6 %
MUCOUS THREADS #/AREA URNS LPF: PRESENT /LPF
NEUTROPHILS # BLD AUTO: 9 10E9/L (ref 1.6–8.3)
NEUTROPHILS NFR BLD AUTO: 67.5 %
NITRATE UR QL: NEGATIVE
NRBC # BLD AUTO: 0 10*3/UL
NRBC BLD AUTO-RTO: 0 /100
PH UR STRIP: 6.5 PH (ref 5–7)
PLATELET # BLD AUTO: 82 10E9/L (ref 150–450)
POTASSIUM SERPL-SCNC: 4.4 MMOL/L (ref 3.4–5.3)
PROT SERPL-MCNC: 7.9 G/DL (ref 6.8–8.8)
RBC # BLD AUTO: 3.96 10E12/L (ref 4.4–5.9)
RBC #/AREA URNS AUTO: 2 /HPF (ref 0–2)
SODIUM SERPL-SCNC: 132 MMOL/L (ref 133–144)
SOURCE: ABNORMAL
SP GR UR STRIP: 1.02 (ref 1–1.03)
SQUAMOUS #/AREA URNS AUTO: 27 /HPF (ref 0–1)
UROBILINOGEN UR STRIP-MCNC: NORMAL MG/DL (ref 0–2)
WBC # BLD AUTO: 13.3 10E9/L (ref 4–11)
WBC #/AREA URNS AUTO: 13 /HPF (ref 0–5)

## 2019-01-14 PROCEDURE — 25000128 H RX IP 250 OP 636: Performed by: INTERNAL MEDICINE

## 2019-01-14 PROCEDURE — 83605 ASSAY OF LACTIC ACID: CPT | Performed by: EMERGENCY MEDICINE

## 2019-01-14 PROCEDURE — 99223 1ST HOSP IP/OBS HIGH 75: CPT | Mod: AI | Performed by: INTERNAL MEDICINE

## 2019-01-14 PROCEDURE — 25000132 ZZH RX MED GY IP 250 OP 250 PS 637: Mod: GY | Performed by: INTERNAL MEDICINE

## 2019-01-14 PROCEDURE — 25000128 H RX IP 250 OP 636: Performed by: EMERGENCY MEDICINE

## 2019-01-14 PROCEDURE — 87088 URINE BACTERIA CULTURE: CPT | Performed by: EMERGENCY MEDICINE

## 2019-01-14 PROCEDURE — 81001 URINALYSIS AUTO W/SCOPE: CPT | Performed by: EMERGENCY MEDICINE

## 2019-01-14 PROCEDURE — 87186 SC STD MICRODIL/AGAR DIL: CPT | Performed by: EMERGENCY MEDICINE

## 2019-01-14 PROCEDURE — 87077 CULTURE AEROBIC IDENTIFY: CPT | Performed by: EMERGENCY MEDICINE

## 2019-01-14 PROCEDURE — 99285 EMERGENCY DEPT VISIT HI MDM: CPT | Mod: 25

## 2019-01-14 PROCEDURE — 96361 HYDRATE IV INFUSION ADD-ON: CPT

## 2019-01-14 PROCEDURE — 71046 X-RAY EXAM CHEST 2 VIEWS: CPT

## 2019-01-14 PROCEDURE — 87040 BLOOD CULTURE FOR BACTERIA: CPT | Performed by: EMERGENCY MEDICINE

## 2019-01-14 PROCEDURE — 96366 THER/PROPH/DIAG IV INF ADDON: CPT

## 2019-01-14 PROCEDURE — 80053 COMPREHEN METABOLIC PANEL: CPT | Performed by: EMERGENCY MEDICINE

## 2019-01-14 PROCEDURE — 85025 COMPLETE CBC W/AUTO DIFF WBC: CPT | Performed by: EMERGENCY MEDICINE

## 2019-01-14 PROCEDURE — A9270 NON-COVERED ITEM OR SERVICE: HCPCS | Mod: GY | Performed by: INTERNAL MEDICINE

## 2019-01-14 PROCEDURE — 12000000 ZZH R&B MED SURG/OB

## 2019-01-14 PROCEDURE — 96365 THER/PROPH/DIAG IV INF INIT: CPT

## 2019-01-14 PROCEDURE — 87086 URINE CULTURE/COLONY COUNT: CPT | Performed by: EMERGENCY MEDICINE

## 2019-01-14 PROCEDURE — 87800 DETECT AGNT MULT DNA DIREC: CPT | Performed by: EMERGENCY MEDICINE

## 2019-01-14 RX ORDER — NALOXONE HYDROCHLORIDE 0.4 MG/ML
.1-.4 INJECTION, SOLUTION INTRAMUSCULAR; INTRAVENOUS; SUBCUTANEOUS
Status: DISCONTINUED | OUTPATIENT
Start: 2019-01-14 | End: 2019-01-17 | Stop reason: HOSPADM

## 2019-01-14 RX ORDER — PROCHLORPERAZINE 25 MG
25 SUPPOSITORY, RECTAL RECTAL EVERY 12 HOURS PRN
Status: DISCONTINUED | OUTPATIENT
Start: 2019-01-14 | End: 2019-01-17 | Stop reason: HOSPADM

## 2019-01-14 RX ORDER — ALBUTEROL SULFATE 0.83 MG/ML
2.5 SOLUTION RESPIRATORY (INHALATION) EVERY 4 HOURS PRN
Status: DISCONTINUED | OUTPATIENT
Start: 2019-01-14 | End: 2019-01-17 | Stop reason: HOSPADM

## 2019-01-14 RX ORDER — ONDANSETRON 2 MG/ML
4 INJECTION INTRAMUSCULAR; INTRAVENOUS EVERY 6 HOURS PRN
Status: DISCONTINUED | OUTPATIENT
Start: 2019-01-14 | End: 2019-01-17 | Stop reason: HOSPADM

## 2019-01-14 RX ORDER — HYDROCORTISONE 10 MG/1
10 TABLET ORAL EVERY EVENING
Status: DISCONTINUED | OUTPATIENT
Start: 2019-01-14 | End: 2019-01-17 | Stop reason: HOSPADM

## 2019-01-14 RX ORDER — PIPERACILLIN SODIUM, TAZOBACTAM SODIUM 3; .375 G/15ML; G/15ML
3.38 INJECTION, POWDER, LYOPHILIZED, FOR SOLUTION INTRAVENOUS ONCE
Status: COMPLETED | OUTPATIENT
Start: 2019-01-14 | End: 2019-01-14

## 2019-01-14 RX ORDER — LANOLIN ALCOHOL/MO/W.PET/CERES
6 CREAM (GRAM) TOPICAL AT BEDTIME
Status: DISCONTINUED | OUTPATIENT
Start: 2019-01-14 | End: 2019-01-17 | Stop reason: HOSPADM

## 2019-01-14 RX ORDER — CARBAMAZEPINE 100 MG/5ML
150 SUSPENSION ORAL EVERY 6 HOURS
Status: DISCONTINUED | OUTPATIENT
Start: 2019-01-15 | End: 2019-01-17 | Stop reason: HOSPADM

## 2019-01-14 RX ORDER — HYDROCORTISONE 20 MG/1
20 TABLET ORAL EVERY MORNING
Status: DISCONTINUED | OUTPATIENT
Start: 2019-01-15 | End: 2019-01-17 | Stop reason: HOSPADM

## 2019-01-14 RX ORDER — ONDANSETRON 4 MG/1
4 TABLET, ORALLY DISINTEGRATING ORAL EVERY 6 HOURS PRN
Status: DISCONTINUED | OUTPATIENT
Start: 2019-01-14 | End: 2019-01-17 | Stop reason: HOSPADM

## 2019-01-14 RX ORDER — SACCHAROMYCES BOULARDII 250 MG
250 CAPSULE ORAL 2 TIMES DAILY
Status: DISCONTINUED | OUTPATIENT
Start: 2019-01-14 | End: 2019-01-17 | Stop reason: HOSPADM

## 2019-01-14 RX ORDER — PROCHLORPERAZINE MALEATE 5 MG
10 TABLET ORAL EVERY 6 HOURS PRN
Status: DISCONTINUED | OUTPATIENT
Start: 2019-01-14 | End: 2019-01-17 | Stop reason: HOSPADM

## 2019-01-14 RX ORDER — SODIUM CHLORIDE 9 MG/ML
INJECTION, SOLUTION INTRAVENOUS CONTINUOUS
Status: DISCONTINUED | OUTPATIENT
Start: 2019-01-14 | End: 2019-01-16

## 2019-01-14 RX ORDER — BACITRACIN ZINC 500 [USP'U]/G
OINTMENT TOPICAL DAILY PRN
Status: DISCONTINUED | OUTPATIENT
Start: 2019-01-14 | End: 2019-01-17 | Stop reason: HOSPADM

## 2019-01-14 RX ORDER — WHEAT DEXTRIN 3 G/3.8 G
POWDER (GRAM) ORAL DAILY
Status: DISCONTINUED | OUTPATIENT
Start: 2019-01-15 | End: 2019-01-14 | Stop reason: RX

## 2019-01-14 RX ORDER — PIPERACILLIN SODIUM, TAZOBACTAM SODIUM 4; .5 G/20ML; G/20ML
4.5 INJECTION, POWDER, LYOPHILIZED, FOR SOLUTION INTRAVENOUS EVERY 6 HOURS
Status: DISCONTINUED | OUTPATIENT
Start: 2019-01-14 | End: 2019-01-17 | Stop reason: HOSPADM

## 2019-01-14 RX ORDER — ACETAMINOPHEN 650 MG/1
650 SUPPOSITORY RECTAL EVERY 4 HOURS PRN
Status: DISCONTINUED | OUTPATIENT
Start: 2019-01-14 | End: 2019-01-17 | Stop reason: HOSPADM

## 2019-01-14 RX ORDER — SCOLOPAMINE TRANSDERMAL SYSTEM 1 MG/1
1 PATCH, EXTENDED RELEASE TRANSDERMAL
Status: DISCONTINUED | OUTPATIENT
Start: 2019-01-14 | End: 2019-01-17 | Stop reason: HOSPADM

## 2019-01-14 RX ORDER — GUAIFENESIN 600 MG/1
600 TABLET, EXTENDED RELEASE ORAL 2 TIMES DAILY
Status: DISCONTINUED | OUTPATIENT
Start: 2019-01-14 | End: 2019-01-15 | Stop reason: CLARIF

## 2019-01-14 RX ADMIN — MELATONIN 6 MG: 3 TAB ORAL at 22:54

## 2019-01-14 RX ADMIN — PIPERACILLIN SODIUM,TAZOBACTAM SODIUM 3.38 G: 3; .375 INJECTION, POWDER, FOR SOLUTION INTRAVENOUS at 16:44

## 2019-01-14 RX ADMIN — BRIVARACETAM 100 MG: 10 SOLUTION ORAL at 22:53

## 2019-01-14 RX ADMIN — PIPERACILLIN AND TAZOBACTAM 4.5 G: 4; .5 INJECTION, POWDER, FOR SOLUTION INTRAVENOUS at 22:45

## 2019-01-14 RX ADMIN — Medication 250 MG: at 22:53

## 2019-01-14 RX ADMIN — POTASSIUM & SODIUM PHOSPHATES POWDER PACK 280-160-250 MG 1 PACKET: 280-160-250 PACK at 22:54

## 2019-01-14 RX ADMIN — SODIUM CHLORIDE: 9 INJECTION, SOLUTION INTRAVENOUS at 21:25

## 2019-01-14 RX ADMIN — SODIUM CHLORIDE 1000 ML: 9 INJECTION, SOLUTION INTRAVENOUS at 17:34

## 2019-01-14 RX ADMIN — SODIUM CHLORIDE 1860 ML: 9 INJECTION, SOLUTION INTRAVENOUS at 16:04

## 2019-01-14 ASSESSMENT — COLUMBIA-SUICIDE SEVERITY RATING SCALE - C-SSRS
2. HAVE YOU ACTUALLY HAD ANY THOUGHTS OF KILLING YOURSELF IN THE PAST MONTH?: NO
1. IN THE PAST MONTH, HAVE YOU WISHED YOU WERE DEAD OR WISHED YOU COULD GO TO SLEEP AND NOT WAKE UP?: NO
6. HAVE YOU EVER DONE ANYTHING, STARTED TO DO ANYTHING, OR PREPARED TO DO ANYTHING TO END YOUR LIFE?: NO

## 2019-01-14 ASSESSMENT — ACTIVITIES OF DAILY LIVING (ADL)
TOILETING: 4-->COMPLETELY DEPENDENT
FALL_HISTORY_WITHIN_LAST_SIX_MONTHS: NO
COGNITION: 0 - NO COGNITION ISSUES REPORTED
BATHING: 4-->COMPLETELY DEPENDENT
SWALLOWING: 2-->DIFFICULTY SWALLOWING LIQUIDS/FOODS
DRESS: 4-->COMPLETELY DEPENDENT
RETIRED_EATING: 4-->COMPLETELY DEPENDENT
AMBULATION: 3-->ASSISTIVE EQUIPMENT AND PERSON
RETIRED_COMMUNICATION: 2-->DIFFICULTY SPEAKING (NOT RELATED TO LANGUAGE BARRIER)
TRANSFERRING: 3-->ASSISTIVE EQUIPMENT AND PERSON

## 2019-01-14 NOTE — ED NOTES
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  Yes         If yes, is there guardianship documents in the Epic \"Code/ACP\" activity?  Yes         Guardian Notified?  Yes  [place] - Suicide Severity Rating Scale Completed?  Yes  If yes, what color did the patient score?  White    Patient Report: Initial Complaint: Fever  Focused Assessment: Pt here with mother after pt began slowly rising a fever. Pt was recently here with severe sepsis so mother is very cautious and focused on catching at beginning of fevers. Pt history of TBI and can express himself with limited basic vocabulary. Pt is fed by peg tube due to increased aphasia though he is currently in speech therapy. Pt is very happy and cooperative. Very difficult stick for IV so fluids are running on pump. Mother is at bedside.  Tests Performed: labs imaging  Abnormal Results: see results  Treatments provided: fluids, monitoring    Family Comments: Mother concerned for pt since septic history.     OBS brochure/video discussed/provided to patient/family: No              Name of person given brochure if not patient: na                Relationship to patient: mother    ED " Medications:   Medications   sodium chloride 0.9% infusion (not administered)   0.9% sodium chloride BOLUS (not administered)   0.9% sodium chloride BOLUS (1,860 mLs Intravenous New Bag 1/14/19 1604)       Drips infusing?:  Yes IV fluids NS    For the majority of the shift this patient was Green.   Interventions performed were none.    Severe Sepsis OR Septic Shock Diagnosis Present: No    To be done/followed up on inpatient unit:  continue to monitor and administer fluids    ED NURSE PHONE NUMBER: [phone]

## 2019-01-14 NOTE — PHARMACY-ADMISSION MEDICATION HISTORY
Admission medication history interview status for the 1/14/2019  admission is complete. See EPIC admission navigator for prior to admission medications     Medication history source reliability:Good    Actions taken by pharmacist (provider contacted, etc):None     Additional medication history information not noted on PTA med list :None    Medication reconciliation/reorder completed by provider prior to medication history? No    Time spent in this activity: 15 minutes     Prior to Admission medications    Medication Sig Last Dose Taking? Auth Provider   bacitracin ointment Apply topically daily as needed for wound care To PEG site. 1/14/2019 at am Yes Unknown, Entered By History   Brivaracetam (BRIVIACT) 10 MG/ML solution 100 mg by Oral or FT or NG tube route 2 times daily @0900 and 2100 1/14/2019 at 0900 Yes Reported, Patient   calcium carbonate 1250 (500 CA) MG/5ML SUSP suspension 5 mLs (1,250 mg) by Per J Tube route 3 times daily (with meals) 1/14/2019 at 0900 Yes Mariana Venegas MD   carBAMazepine (TEGRETOL) 100 MG/5ML suspension Take 150 mg by mouth every 6 hours At 06:00, 12:00, 18:00 and 24:00 for seizures 1/14/2019 at 1200 Yes Unknown, Entered By History   hydrocortisone (CORTEF) 5 MG tablet 10 mg by Oral or FT or NG tube route every evening  1/13/2019 at 1500 Yes Unknown, Entered By History   hydrocortisone (CORTEF) 5 MG tablet 20 mg by Oral or FT or NG tube route every morning  1/14/2019 at 0900 Yes Unknown, Entered By History   levothyroxine (SYNTHROID/LEVOTHROID) 137 MCG tablet 137 mcg by Oral or FT or NG tube route daily 1/14/2019 at 0600 Yes Unknown, Entered By History   melatonin (MELATONIN) 1 MG/ML LIQD liquid 6 mg by Per NG tube route At Bedtime  1/13/2019 at pm Yes Unknown, Entered By History   Multiple Vitamins-Minerals (CENTRUM SILVER) per tablet Take 1 tablet by mouth daily Crush and feed via j-tube 1/14/2019 at am Yes Unknown, Entered By History   pantoprazole (PROTONIX) 2 mg/mL SUSP  suspension 40 mg by Per NG tube route daily  1/14/2019 at am Yes Unknown, Entered By History   potassium & sodium phosphates (NEUTRA-PHOS) 280-160-250 MG Packet Take 1 packet by mouth 3 times daily 0900, 1500, 2100.  1/14/2019 at 0900 Yes Unknown, Entered By History   saccharomyces boulardii (FLORASTOR) 250 MG capsule 1 capsule (250 mg) by Oral or Feeding Tube route 2 times daily 1/14/2019 at am Yes Lakia Beebe PA-C   scopolamine (TRANSDERM-SCOP, 1.5 MG,) 72 hr patch Place 1 patch onto the skin every 72 hours 1/13/2019 at am Yes Reported, Patient   VITAMIN D, CHOLECALCIFEROL, PO Take 2,000 Units by mouth every morning  1/14/2019 at am Yes Unknown, Entered By History   Wheat Dextrin (BENEFIBER PO) 2 teaspoonful by Per NG tube route daily  1/14/2019 at am Yes Unknown, Entered By History   acetaminophen (TYLENOL) 500 MG tablet 1,000 mg by Oral or FT or NG tube route every 6 hours as needed for mild pain More than a month at Unknown time  Unknown, Entered By History   albuterol (2.5 MG/3ML) 0.083% neb solution Take 1 vial by nebulization every 4 hours as needed for shortness of breath / dyspnea or wheezing More than a month at Unknown time  Unknown, Entered By History   testosterone cypionate (DEPOTESTOTERONE CYPIONATE) 200 MG/ML injection Inject 76 mg into the muscle See Admin Instructions Every 2 weeks on Fridays   76 mg or 0.38 mL 12/28/2018 at am  Unknown, Entered By History

## 2019-01-14 NOTE — ED NOTES
Bed: ED05  Expected date:   Expected time:   Means of arrival:   Comments:  Abraham Manning 2 , 58ymaile TBI PNA symptoms-ETA 2min

## 2019-01-14 NOTE — ED NOTES
Pt a vbery difficult IV stick , was able to draw intitial labs from left arm. Family stated that the pt is usually an US IV . Notified staff and am currently waiting for assistance.

## 2019-01-14 NOTE — ED PROVIDER NOTES
History     Chief Complaint:  Fever      HPI: The patient is nonverbal but appropriately responds to Yes / No questioning with gestures.            Keyon Farias is a 58 year old male who presents to the emergency department today for evaluation of a fever. The patient lives at home with 2 care givers who noticed  Fever this morning. He has been coughing up more. The fever was measured at 100.2. He was noted to ave more secretions and a cough. He has been around family members that have had a cough. He denies chest pain, abdominal pain, nausea or vomiting. He reports mild body aches.       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)   DVT of upper extremity (deep vein thrombosis)   Gastro-oesophageal reflux disease  Panhypopituitarism    Pneumonia   Seizures   Septic shock   Spastic hemiplegia affecting dominant side   Thyroid disease   Tracheostomy care    Traumatic brain injury   Unspecified cerebral artery occlusion with cerebral infarction   UTI (urinary tract infection)   Ventricular fibrillation    Ventricular tachyarrhythmia      Past Surgical History:    Endoscopic ultrasound upper gastrointestinal tract  Endoscopic ultrasound, esophagoscopy, gastroscopy, duodenoscopy, necrosectomy   Esophagoscopy, gastroscopy, duodenoscopy x3  Head and neck surgery   Laparoscopic appendectomy   Laparoscopic assisted insertion tube gastrotomy   Orthopedic surgery  Tracheostomy x2  Vascular surgery     Family History:    History reviewed. No pertinent family history.      Social History:  Smoking Status: Former Smoker   Years since quittin.7  Smokeless Tobacco: Never Used  Alcohol Use: Negative    Marital Status:  Single      Review of Systems   Unable to perform ROS: Patient nonverbal       Physical Exam     Vital  signs  Patient Vitals for the past 24 hrs:   BP Temp Temp src Heart Rate Resp SpO2   01/14/19 1530 -- -- -- 76 10 95 %   01/14/19 1430 -- -- -- -- -- 96 %   01/14/19 1400 -- -- -- -- -- 97 %   01/14/19 1308 105/66 98.2  F (36.8  C) Temporal 86 16 96 %      Physical Exam  SKIN:  Warm, dry.  HEMATOLOGIC/IMMUNOLOGIC/LYMPHATIC:  No pallor.  HENT:  Dry oral mucosa.  No stridor.  No JVD.  EYES:  Conjunctivae normal.  EOM normal.  CARDIOVASCULAR:  Regular rate and rhythm.  No murmur.  RESPIRATORY:  No respiratory distress, breath sounds equal and normal.  GASTROINTESTINAL:  Soft, nontender abdomen.  No mass or distension.  GENITOURINARY:  Clear yellow urine draining from aaron catheter.  NEUROLOGIC:  Alert, nods yes or no to questions.  PSYCHIATRIC:  Unable.    Emergency Department Course   Imaging:  XR Chest 2 Views   Final Result   IMPRESSION: Since December 21, 2018, persistent shallow inspiration.   Scattered bibasilar opacities are comparable to previous exam, perhaps   atelectasis or scar formation. Underlying pneumonia cannot be   excluded. No pneumothorax or pleural effusion.      EDER SINHA MD        Results as read by radiology.       Laboratory:  Blood Culture: pending x 2     CBC: WBC 13.3, RBC 3.96, HGB 11.2, 'HCT 33.6, PLT 82, Absolute Neutrophil 9.0 otherwise within normal limits   CMP: , Glucose 147, Albumin 3.0, otherwise within normal limits     Lactate 2.3    UA: Urine Glucose 70, Protein Albumin Urine 10, leukocyte esterase Large, WBC 13, Bacteria Few, Squamous Epithelial Urine 27, Mucous present otherwise within normal limits   Urine Culture: pending     Interventions:  1604: NS 1.860 L IV Bolus   1734: NS 1L IV Bolus   1644: Zosyn 3.375g     SEVERE SEPSIS and SEPTIC SHOCK EARLY MANAGEMENT BUNDLE    SEVERE SEPSIS:     Keyon Farias meets criteria for severe sepsis as evidenced by:     1. 2 SIRS criteria, AND    2. Suspected infection, AND     3.  ACUTE Organ dysfunction:  Lactic Acid >2, Plt  count < 100k     Time severe sepsis present = 13:54    SEPTIC SHOCK:     Keyon Fairas does not meet criteria for septic shock    3 Hour Bundle Completion (Severe Sepsis and Septic Shock):  1. Initial Lactic Acid Result:  13:54  2. Blood Cultures before Antibiotics: Yes  3. Broad Spectrum Antibiotics Administered: Yes       Anti-infectives (From now, onward)    Start     Dose/Rate Route Frequency Ordered Stop    01/14/19 2300  piperacillin-tazobactam (ZOSYN) 4.5 g vial to attach to  mL bag     Comments:  Pharmacy can adjust dose based on renal function.    4.5 g  over 30 Minutes Intravenous EVERY 6 HOURS 01/14/19 2132          4. Administer 30 mL/kg for hypotension or Lactate > 4 mmol/L: No, not hypotensive in ED and lactate less than 4    Emergency Department Course:  1325: Past medical records, nursing notes, and vitals reviewed. I performed an exam of the patient and obtained history, as documented above.       IV inserted and blood drawn for the above work up to be conducted.     The patient was sent for imaging studies while in the emergency department, findings above.     Findings and plan explained to the mother who consents to admission.     1613: Discussed the patient with Dr. Motta, who will admit the patient to a medicine bed for further monitoring, evaluation, and treatment.      Impression & Plan    Medical Decision Making:  Keyon Farias is a 58 year old male most likely suffering sepsis. He is clinically well appearing and hemodynamically stable. Ultimately it is unclear yet as to what the source of his illness might be. Opted for Zosyn as a broad spectrum antibiotic. There was much difficulty in establishing IV access but that was finally established and IV hydration initiated.  The patient's mother/gaudian regarding the plan and she was agreeable.     Diagnosis:    ICD-10-CM    1. Severe sepsis (H) A41.9     R65.20        Disposition:  Admitted.     IAmy, am serving as a scribe at  1:30 PM on 1/14/2019 to document services personally performed by Too Rodarte MD based on my observations and the provider's statements to me.    1/14/2019    EMERGENCY DEPARTMENT       Too Rodarte MD  01/15/19 0958

## 2019-01-15 LAB
ANION GAP SERPL CALCULATED.3IONS-SCNC: 8 MMOL/L (ref 3–14)
BUN SERPL-MCNC: 14 MG/DL (ref 7–30)
CALCIUM SERPL-MCNC: 8 MG/DL (ref 8.5–10.1)
CHLORIDE SERPL-SCNC: 103 MMOL/L (ref 94–109)
CO2 SERPL-SCNC: 25 MMOL/L (ref 20–32)
CREAT SERPL-MCNC: 0.73 MG/DL (ref 0.66–1.25)
ERYTHROCYTE [DISTWIDTH] IN BLOOD BY AUTOMATED COUNT: 13.3 % (ref 10–15)
FLUAV+FLUBV RNA SPEC QL NAA+PROBE: NEGATIVE
FLUAV+FLUBV RNA SPEC QL NAA+PROBE: NEGATIVE
GFR SERPL CREATININE-BSD FRML MDRD: >90 ML/MIN/{1.73_M2}
GLUCOSE SERPL-MCNC: 96 MG/DL (ref 70–99)
HCT VFR BLD AUTO: 31.1 % (ref 40–53)
HGB BLD-MCNC: 10.5 G/DL (ref 13.3–17.7)
MCH RBC QN AUTO: 28.5 PG (ref 26.5–33)
MCHC RBC AUTO-ENTMCNC: 33.8 G/DL (ref 31.5–36.5)
MCV RBC AUTO: 85 FL (ref 78–100)
PLATELET # BLD AUTO: 118 10E9/L (ref 150–450)
POTASSIUM SERPL-SCNC: 4.1 MMOL/L (ref 3.4–5.3)
PROCALCITONIN SERPL-MCNC: <0.05 NG/ML
RBC # BLD AUTO: 3.68 10E12/L (ref 4.4–5.9)
RSV RNA SPEC NAA+PROBE: NEGATIVE
SODIUM SERPL-SCNC: 136 MMOL/L (ref 133–144)
SPECIMEN SOURCE: NORMAL
WBC # BLD AUTO: 6 10E9/L (ref 4–11)

## 2019-01-15 PROCEDURE — 36415 COLL VENOUS BLD VENIPUNCTURE: CPT | Performed by: INTERNAL MEDICINE

## 2019-01-15 PROCEDURE — 25000132 ZZH RX MED GY IP 250 OP 250 PS 637: Mod: GY | Performed by: INTERNAL MEDICINE

## 2019-01-15 PROCEDURE — 25000128 H RX IP 250 OP 636: Performed by: EMERGENCY MEDICINE

## 2019-01-15 PROCEDURE — 84145 PROCALCITONIN (PCT): CPT | Performed by: INTERNAL MEDICINE

## 2019-01-15 PROCEDURE — 80048 BASIC METABOLIC PNL TOTAL CA: CPT | Performed by: INTERNAL MEDICINE

## 2019-01-15 PROCEDURE — 85027 COMPLETE CBC AUTOMATED: CPT | Performed by: INTERNAL MEDICINE

## 2019-01-15 PROCEDURE — 87631 RESP VIRUS 3-5 TARGETS: CPT | Performed by: INTERNAL MEDICINE

## 2019-01-15 PROCEDURE — 25000128 H RX IP 250 OP 636: Performed by: INTERNAL MEDICINE

## 2019-01-15 PROCEDURE — 25000125 ZZHC RX 250: Performed by: INTERNAL MEDICINE

## 2019-01-15 PROCEDURE — 99232 SBSQ HOSP IP/OBS MODERATE 35: CPT | Performed by: INTERNAL MEDICINE

## 2019-01-15 PROCEDURE — 12000000 ZZH R&B MED SURG/OB

## 2019-01-15 PROCEDURE — A9270 NON-COVERED ITEM OR SERVICE: HCPCS | Mod: GY | Performed by: INTERNAL MEDICINE

## 2019-01-15 RX ADMIN — POTASSIUM & SODIUM PHOSPHATES POWDER PACK 280-160-250 MG 1 PACKET: 280-160-250 PACK at 21:46

## 2019-01-15 RX ADMIN — HYDROCORTISONE 10 MG: 10 TABLET ORAL at 00:44

## 2019-01-15 RX ADMIN — GUAIFENESIN 10 ML: 200 SOLUTION ORAL at 00:44

## 2019-01-15 RX ADMIN — POTASSIUM & SODIUM PHOSPHATES POWDER PACK 280-160-250 MG 1 PACKET: 280-160-250 PACK at 08:58

## 2019-01-15 RX ADMIN — Medication 250 MG: at 08:58

## 2019-01-15 RX ADMIN — SCOPALAMINE 1 PATCH: 1 PATCH, EXTENDED RELEASE TRANSDERMAL at 13:04

## 2019-01-15 RX ADMIN — SCOPALAMINE 1 PATCH: 1 PATCH, EXTENDED RELEASE TRANSDERMAL at 00:45

## 2019-01-15 RX ADMIN — BRIVARACETAM 100 MG: 10 SOLUTION ORAL at 21:45

## 2019-01-15 RX ADMIN — HYDROCORTISONE 20 MG: 20 TABLET ORAL at 08:58

## 2019-01-15 RX ADMIN — CARBAMAZEPINE 150 MG: 100 SUSPENSION ORAL at 12:55

## 2019-01-15 RX ADMIN — SODIUM CHLORIDE: 9 INJECTION, SOLUTION INTRAVENOUS at 06:22

## 2019-01-15 RX ADMIN — MICONAZOLE NITRATE: 2 POWDER TOPICAL at 12:55

## 2019-01-15 RX ADMIN — CARBAMAZEPINE 150 MG: 100 SUSPENSION ORAL at 19:38

## 2019-01-15 RX ADMIN — MULTIVITAMIN 15 ML: LIQUID ORAL at 08:58

## 2019-01-15 RX ADMIN — Medication 2000 UNITS: at 08:58

## 2019-01-15 RX ADMIN — CARBAMAZEPINE 150 MG: 100 SUSPENSION ORAL at 00:44

## 2019-01-15 RX ADMIN — PIPERACILLIN AND TAZOBACTAM 4.5 G: 4; .5 INJECTION, POWDER, FOR SOLUTION INTRAVENOUS at 16:37

## 2019-01-15 RX ADMIN — POTASSIUM & SODIUM PHOSPHATES POWDER PACK 280-160-250 MG 1 PACKET: 280-160-250 PACK at 16:37

## 2019-01-15 RX ADMIN — PIPERACILLIN AND TAZOBACTAM 4.5 G: 4; .5 INJECTION, POWDER, FOR SOLUTION INTRAVENOUS at 11:15

## 2019-01-15 RX ADMIN — CARBAMAZEPINE 150 MG: 100 SUSPENSION ORAL at 06:21

## 2019-01-15 RX ADMIN — MELATONIN 6 MG: 3 TAB ORAL at 21:42

## 2019-01-15 RX ADMIN — HYDROCORTISONE 10 MG: 10 TABLET ORAL at 21:42

## 2019-01-15 RX ADMIN — LEVOTHYROXINE SODIUM 137 MCG: 112 TABLET ORAL at 08:58

## 2019-01-15 RX ADMIN — PIPERACILLIN AND TAZOBACTAM 4.5 G: 4; .5 INJECTION, POWDER, FOR SOLUTION INTRAVENOUS at 04:58

## 2019-01-15 RX ADMIN — Medication 40 MG: at 09:17

## 2019-01-15 RX ADMIN — Medication 250 MG: at 21:45

## 2019-01-15 RX ADMIN — BRIVARACETAM 100 MG: 10 SOLUTION ORAL at 08:58

## 2019-01-15 ASSESSMENT — ACTIVITIES OF DAILY LIVING (ADL)
ADLS_ACUITY_SCORE: 34
ADLS_ACUITY_SCORE: 40
ADLS_ACUITY_SCORE: 38
ADLS_ACUITY_SCORE: 34
ADLS_ACUITY_SCORE: 38
ADLS_ACUITY_SCORE: 34

## 2019-01-15 NOTE — PROGRESS NOTES
[facility]    Hospitalist Progress Note    Assessment & Plan   Keyon Farias is a  58-year-old gentleman with history of traumatic brain injury, seizures, recurrent aspiration pneumonia, dysphagia with a PEG tube in place who has had multiple admissions in the last 6 months, a total of 4, primarily due to recurrent pneumonia.  He is admitted with fever, increased cough with production.  A chest x-ray does not show any worsening infiltrates or effusions but appears to have a low-grade fever and increased secretions that is concerning for possible pneumonia.      Probable aspiration pneumonia.    -Patient has recurrent aspiration pneumonias in the past  -Presents with fever cough and increasing sputum production with concerns for pneumonia.  -Chest x-ray was read as shallow inspiration with bilateral atelectasis cannot exclude pneumonia  -Continue Zosyn for now  -Await cultures, monitor fever profile  -Influenza PCR negative  -Appears to be improving    History of recurrent aspiration pneumonia and chronic dysphagia.    -Nutrition consult to resume tube feeds.      Thrombocytopenia.   -Patient's platelet counts at presentation was 82,000, improving, 118 K today      History of panhypopituitarism.  -continue prior to admission hydrocortisone and levothyroxine.    History of seizure disorder.    Traumatic brain injury with right-sided hemiparesis  -continue Tegretol and Briviact.         # Pain Assessment:  Current Pain Score 1/14/2019   Patient currently in pain? HECTOR   Pain score (0-10) -   Pain location -   Pain descriptors -   rFLACC pain score -       D/W: RN  DVT Prophylaxis: Pneumatic Compression Devices  Code Status: Full Code    Disposition: Expected discharge in 1-2 days    Migel Stoddard MD    Interval History   Afebrile this morning.  Patient overall feels better.  Not on any supplemental oxygen.  No acute nursing concerns    -Data reviewed today: I reviewed all new labs and imaging results  over the last 24 hours. I personally reviewed no images or EKG's today.      Physical Exam   Temp: 98.8  F (37.1  C) Temp src: Oral BP: 116/57 Pulse: 53 Heart Rate: 63 Resp: 18 SpO2: 98 % O2 Device: None (Room air)    There were no vitals filed for this visit.  Vital Signs with Ranges  Temp:  [97.5  F (36.4  C)-98.8  F (37.1  C)] 98.8  F (37.1  C)  Pulse:  [53-77] 53  Heart Rate:  [60-86] 63  Resp:  [10-18] 18  BP: ()/(48-73) 116/57  SpO2:  [95 %-99 %] 98 %  I/O last 3 completed shifts:  In: 200 [P.O.:200]  Out: -     Constitutional: Awake, responds appropriately by thumbs up, nonverbal  HEENT:  No pallor or icterus  Neck- Supple, Good ROM, No JVD  Respiratory: Coarse breath sounds bilaterally, normal effort of breathing.    Cardiovascular: RRR, No murmur  GI: Soft, Non- tender, BS- normoactive, G-tube in place  Skin/Integument: Warm and dry, no rashes  MSK: No joint deformity or swelling, no edema  Neuro: Chronic residual right-sided hemiparesis      Medications       sodium chloride 150 mL/hr at 01/15/19 0622         Brivaracetam  100 mg Per Feeding Tube BID     carBAMazepine  150 mg Oral Q6H     cholecalciferol  2,000 Units Per Feeding Tube QAM     hydrocortisone  10 mg Per Feeding Tube QPM     hydrocortisone  20 mg Per Feeding Tube QAM     levothyroxine  137 mcg Per Feeding Tube Daily     melatonin  6 mg Per NG tube At Bedtime     multivitamins w/minerals  15 mL Per Feeding Tube Daily     pantoprazole  40 mg Per NG tube Daily     piperacillin-tazobactam  4.5 g Intravenous Q6H     potassium & sodium phosphates  1 packet Per Feeding Tube TID     saccharomyces boulardii  250 mg Oral or Feeding Tube BID     scopolamine  1 patch Transdermal Q72H     scopolamine   Transdermal Q8H     [START ON 1/17/2019] scopolamine   Transdermal Q72H       Data     Recent Labs   Lab 01/15/19  0954 01/14/19  1315   WBC 6.0 13.3*   HGB 10.5* 11.2*   MCV 85 85   * 82*    132*   POTASSIUM 4.1 4.4   CHLORIDE 103 95    CO2 25 30   BUN 14 20   CR 0.73 0.85   ANIONGAP 8 7   STEVE 8.0* 8.5   GLC 96 147*   ALBUMIN  --  3.0*   PROTTOTAL  --  7.9   BILITOTAL  --  0.3   ALKPHOS  --  99   ALT  --  30   AST  --  17       Recent Results (from the past 24 hour(s))   XR Chest 2 Views    Narrative    CHEST TWO VIEWS  1/14/2019 3:14 PM     HISTORY: 58-year-old patient with cough.       Impression    IMPRESSION: Since December 21, 2018, persistent shallow inspiration.  Scattered bibasilar opacities are comparable to previous exam, perhaps  atelectasis or scar formation. Underlying pneumonia cannot be  excluded. No pneumothorax or pleural effusion.    EDER SINHA MD

## 2019-01-15 NOTE — CONSULTS
CLINICAL NUTRITION SERVICES  -  ASSESSMENT NOTE      Recommendations Ordered by Registered Dietitian (RD):   - Resume tube feedings via G/J tube tomorrow morning per home schedule = Isosource 1.5 at 100 mL/hr x 12 hours (7 am - 7 pm) to provide 1800 kcal (26 kcal/kg), 82g protein (1.2 kcal/kg), 211g CHO, 18g fiber and 912mL water.   Feeding Tube Flush: 120 mL 4 times daily   Malnutrition:  % Weight Loss:  Wt loss does not meet criteria   % Intake:  Decreased intake does not meet criteria for malnutrition   Subcutaneous Fat Loss:  None observed  Muscle Loss:  None observed  Fluid Retention:  None noted    Malnutrition Diagnosis: Patient does not meet two of the above criteria necessary for diagnosing malnutrition        REASON FOR ASSESSMENT  Keyon Farias is a 58 year old male seen by Registered Dietitian for Provider Order - Registered Dietitian to Assess and Order TF per Medical Nutrition protocol      NUTRITION HISTORY  - Information obtained from chart review and pt's mother:  - Pt well known to nutrition services from recent admissions   - Hx of TBI, DVT, GERD, pneumonia, seizures, septic shock, spastic hemiplegia affecting dominant side and thyroid disease  - Home regimen is as follows =  Jevity 1.5 (5 to 5.5 cans daily) x 12 hours during the day to provide 2163-2528 kcal, 77-83g protein, ~260g CHO, ~27g fiber and ~900mL free water   -  Previous TF flushes were 120 mL 4 times daily  - Pt's mother is care taker and lives with him. She would like to keep TF running no more than 100 mL/hr  - She states that pt requires day-time feedings (7am-7pm) so he can be up in the chair to prevent aspiration. If pt is laying down (HOB ~40 degrees) she prefers TF to be slowed down to 80 mL/hr  - Mother is concerned pt has lost weight. She attempts to give pt 5.5 cans per day vs 5 but isn't always able to do so   - Last TF ran yesterday morning at 7 am, and likely stopped around noon      CURRENT NUTRITION ORDERS  Diet Order:      NPO     Current Intake/Tolerance:  Pt with no intake yet this admit     Stooling x 2 today      PHYSICAL FINDINGS  Observed  No nutrition-related physical findings observed  Obtained from Chart/Interdisciplinary Team  None noted    ANTHROPOMETRICS  Height: 6'  Weight: 74 kg (RN re-weigh 1/15)  BMI: 22.12 kg/m^2  Weight Status:  Normal BMI  IBW: 83.6 kg   % IBW: 88%  Weight History: Recent wt on 12/24 likely inaccurate. RN re-weighed pt today which showed 163# (74 kg). This is consistent with wt trends.   Wt Readings from Last 10 Encounters:   12/24/18 62 kg (136 lb 11 oz)   11/25/18 71.7 kg (158 lb 1.1 oz)   11/04/18 71.5 kg (157 lb 10.1 oz)   09/22/18 70.3 kg (155 lb)   09/21/18 78 kg (171 lb 15.3 oz)   09/03/18 72.3 kg (159 lb 6.3 oz)   07/07/18 74.7 kg (164 lb 10.9 oz)   05/19/18 71.8 kg (158 lb 3.2 oz)   05/05/18 77.4 kg (170 lb 10.2 oz)   04/04/18 72.6 kg (160 lb)       LABS  Labs reviewed    MEDICATIONS  Medications reviewed      ASSESSED NUTRITION NEEDS PER APPROVED PRACTICE GUIDELINES:    Dosing Weight 62 kg - most recent   Estimated Energy Needs: 5023-6591+ kcals (25-30+ Kcal/Kg)  Justification: maintenance  Estimated Protein Needs: 75-95 grams protein (1.2-1.5 g pro/Kg)  Justification: preservation of lean body mass  Estimated Fluid Needs: 8445-7083+   mL (1 mL/Kcal)  Justification: maintenance    MALNUTRITION:  % Weight Loss:  Wt loss does not meet criteria   % Intake:  Decreased intake does not meet criteria for malnutrition   Subcutaneous Fat Loss:  None observed  Muscle Loss:  None observed  Fluid Retention:  None noted    Malnutrition Diagnosis: Patient does not meet two of the above criteria necessary for diagnosing malnutrition      NUTRITION DIAGNOSIS:  Inadequate protein-energy intake related to TF reliant at baseline as evidenced by TF off during admission, planned to start tomorrow      NUTRITION INTERVENTIONS  Recommendations / Nutrition Prescription  NPO     Isosource 1.5 at 100 mL/hr x 12  hours (7am-7pm)      Implementation  Nutrition education: Per Provider order if indicated   EN Composition, EN Schedule, Feeding Tube Flush: as above  Collaboration and Referral of Nutrition care: Discussed weights and TF with RN      Nutrition Goals  Initiate enteral nutrition within the next 24 hours       MONITORING AND EVALUATION:  Progress towards goals will be monitored and evaluated per protocol and Practice Guidelines          Yanely Jefferson RD, LD  Clinical Dietitian

## 2019-01-15 NOTE — PROGRESS NOTES
RECEIVING UNIT ED HANDOFF REVIEW    ED Nurse Handoff Report was reviewed by: Rosa Linares on January 14, 2019 at 7:40 PM

## 2019-01-15 NOTE — PLAN OF CARE
HECTOR orientation, but responds appropriately to yes/no questions and gives thumbs up, otherwise non-verbal. PEG tube in place, BS +. LS diminished to BLL, infrequent cough. PTA external catheter removed, tape was used to secure it, open red lateral line to anterior shaft of penis. Skin around nose, behind neck, red, itchy, dry. Blanchable and non-blanchable redness to sacrum/coccyx/bilateral buttocks. VSS, on RA, denies pain. IVF  @150. UC/BC's pending. IV anx.
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
## 2019-01-15 NOTE — PLAN OF CARE
HECTOR orientation, answers yes/no questions or thumbs up; otherwise non-verbal. VSS on RA; afebrile. Bedrest, A2. T/R. Pt removed external catheter, did not replace d/t redness on penis. IVF infusing. IV abx. G-tube in place, BS+. LS dim. Infreq cough, nonprod. Influenza sample sent. Pt removed scopolamine patch. Contact and Droplet iso.

## 2019-01-15 NOTE — PLAN OF CARE
Pt alert,unable to assess orientation. Will answer yes or no or thumbs up. Cooperative. Afebrile. LSC. Up with strong assist of 2/belt. Tube feedings restarted. Jessica area red and rashy,microguard applied after each incontinence. Occasional npc,swallows. Contact iso. Mom at bedside.

## 2019-01-15 NOTE — PHARMACY-CONSULT NOTE
Pharmacy Tube Feeding Consult    Medication reviewed for administration by feeding tube and for potential food/drug interactions.    Recommendation: Added notes to carbamazepine order to dilute 1:1 with water to minimize adherence to the tubing (per policy)   Pharmacy will continue to follow as new medications are ordered.    Noa Flanagan, PharmD, BCPS

## 2019-01-16 LAB
ERYTHROCYTE [DISTWIDTH] IN BLOOD BY AUTOMATED COUNT: 13.3 % (ref 10–15)
GLUCOSE BLDC GLUCOMTR-MCNC: 125 MG/DL (ref 70–99)
HCT VFR BLD AUTO: 34.8 % (ref 40–53)
HGB BLD-MCNC: 11.7 G/DL (ref 13.3–17.7)
MCH RBC QN AUTO: 28.4 PG (ref 26.5–33)
MCHC RBC AUTO-ENTMCNC: 33.6 G/DL (ref 31.5–36.5)
MCV RBC AUTO: 85 FL (ref 78–100)
PLATELET # BLD AUTO: 138 10E9/L (ref 150–450)
RBC # BLD AUTO: 4.12 10E12/L (ref 4.4–5.9)
WBC # BLD AUTO: 5 10E9/L (ref 4–11)

## 2019-01-16 PROCEDURE — 25000125 ZZHC RX 250: Performed by: INTERNAL MEDICINE

## 2019-01-16 PROCEDURE — 25000128 H RX IP 250 OP 636: Performed by: INTERNAL MEDICINE

## 2019-01-16 PROCEDURE — 36415 COLL VENOUS BLD VENIPUNCTURE: CPT | Performed by: INTERNAL MEDICINE

## 2019-01-16 PROCEDURE — 25000132 ZZH RX MED GY IP 250 OP 250 PS 637: Mod: GY | Performed by: INTERNAL MEDICINE

## 2019-01-16 PROCEDURE — A9270 NON-COVERED ITEM OR SERVICE: HCPCS | Mod: GY | Performed by: INTERNAL MEDICINE

## 2019-01-16 PROCEDURE — 85027 COMPLETE CBC AUTOMATED: CPT | Performed by: INTERNAL MEDICINE

## 2019-01-16 PROCEDURE — 27210429 ZZH NUTRITION PRODUCT INTERMEDIATE LITER

## 2019-01-16 PROCEDURE — 12000000 ZZH R&B MED SURG/OB

## 2019-01-16 PROCEDURE — 87040 BLOOD CULTURE FOR BACTERIA: CPT | Performed by: INTERNAL MEDICINE

## 2019-01-16 PROCEDURE — 00000146 ZZHCL STATISTIC GLUCOSE BY METER IP

## 2019-01-16 PROCEDURE — 99232 SBSQ HOSP IP/OBS MODERATE 35: CPT | Performed by: INTERNAL MEDICINE

## 2019-01-16 RX ORDER — VANCOMYCIN HYDROCHLORIDE 1 G/200ML
1000 INJECTION, SOLUTION INTRAVENOUS EVERY 8 HOURS
Status: DISCONTINUED | OUTPATIENT
Start: 2019-01-16 | End: 2019-01-16

## 2019-01-16 RX ADMIN — CARBAMAZEPINE 150 MG: 100 SUSPENSION ORAL at 18:38

## 2019-01-16 RX ADMIN — Medication 40 MG: at 08:43

## 2019-01-16 RX ADMIN — POTASSIUM & SODIUM PHOSPHATES POWDER PACK 280-160-250 MG 1 PACKET: 280-160-250 PACK at 08:43

## 2019-01-16 RX ADMIN — CARBAMAZEPINE 150 MG: 100 SUSPENSION ORAL at 00:29

## 2019-01-16 RX ADMIN — CARBAMAZEPINE 150 MG: 100 SUSPENSION ORAL at 06:56

## 2019-01-16 RX ADMIN — Medication 250 MG: at 08:44

## 2019-01-16 RX ADMIN — Medication 2000 UNITS: at 08:43

## 2019-01-16 RX ADMIN — POTASSIUM & SODIUM PHOSPHATES POWDER PACK 280-160-250 MG 1 PACKET: 280-160-250 PACK at 16:24

## 2019-01-16 RX ADMIN — POTASSIUM & SODIUM PHOSPHATES POWDER PACK 280-160-250 MG 1 PACKET: 280-160-250 PACK at 21:43

## 2019-01-16 RX ADMIN — SCOPALAMINE 1 PATCH: 1 PATCH, EXTENDED RELEASE TRANSDERMAL at 06:50

## 2019-01-16 RX ADMIN — MELATONIN 6 MG: 3 TAB ORAL at 21:43

## 2019-01-16 RX ADMIN — VANCOMYCIN HYDROCHLORIDE 1000 MG: 1 INJECTION, SOLUTION INTRAVENOUS at 02:24

## 2019-01-16 RX ADMIN — PIPERACILLIN AND TAZOBACTAM 4.5 G: 4; .5 INJECTION, POWDER, FOR SOLUTION INTRAVENOUS at 11:12

## 2019-01-16 RX ADMIN — MULTIVITAMIN 15 ML: LIQUID ORAL at 08:43

## 2019-01-16 RX ADMIN — PIPERACILLIN AND TAZOBACTAM 4.5 G: 4; .5 INJECTION, POWDER, FOR SOLUTION INTRAVENOUS at 16:24

## 2019-01-16 RX ADMIN — BRIVARACETAM 100 MG: 10 SOLUTION ORAL at 21:48

## 2019-01-16 RX ADMIN — Medication 250 MG: at 21:43

## 2019-01-16 RX ADMIN — BRIVARACETAM 100 MG: 10 SOLUTION ORAL at 09:27

## 2019-01-16 RX ADMIN — PIPERACILLIN AND TAZOBACTAM 4.5 G: 4; .5 INJECTION, POWDER, FOR SOLUTION INTRAVENOUS at 00:32

## 2019-01-16 RX ADMIN — HYDROCORTISONE 10 MG: 10 TABLET ORAL at 21:44

## 2019-01-16 RX ADMIN — LEVOTHYROXINE SODIUM 137 MCG: 112 TABLET ORAL at 08:43

## 2019-01-16 RX ADMIN — PIPERACILLIN AND TAZOBACTAM 4.5 G: 4; .5 INJECTION, POWDER, FOR SOLUTION INTRAVENOUS at 06:48

## 2019-01-16 RX ADMIN — HYDROCORTISONE 20 MG: 20 TABLET ORAL at 08:43

## 2019-01-16 RX ADMIN — CARBAMAZEPINE 150 MG: 100 SUSPENSION ORAL at 11:51

## 2019-01-16 ASSESSMENT — ACTIVITIES OF DAILY LIVING (ADL)
ADLS_ACUITY_SCORE: 38

## 2019-01-16 NOTE — PROGRESS NOTES
[facility]    Hospitalist Progress Note    Assessment & Plan   Keyon Farias is a  58-year-old gentleman with history of traumatic brain injury, seizures, recurrent aspiration pneumonia, dysphagia with a PEG tube in place who has had multiple admissions in the last 6 months, a total of 4, primarily due to recurrent pneumonia.  He is admitted with fever, increased cough with production.  A chest x-ray does not show any worsening infiltrates or effusions but appears to have a low-grade fever and increased secretions that is concerning for possible pneumonia.      Probable aspiration pneumonia.    -Patient has recurrent aspiration pneumonias in the past  -Presents with fever cough and increasing sputum production with concerns for pneumonia.  -Chest x-ray was read as shallow inspiration with bilateral atelectasis cannot exclude pneumonia  -Continue Zosyn for now  -Blood culture positive for gram-positive cocci in clusters, likely coagulase-negative staphylococci  -Received 1 dose of vancomycin overnight, check repeat blood cultures, hold off on additional dose of vancomycin as the positive culture is more than likely a contaminant  -Influenza PCR negative  -Appears to be improving    History of recurrent aspiration pneumonia and chronic dysphagia.    -Continue tube feeds.      Thrombocytopenia.   -Patient's platelet counts at presentation was 82,000, improving      History of panhypopituitarism.  -continue prior to admission hydrocortisone and levothyroxine.    History of seizure disorder.    Traumatic brain injury with right-sided hemiparesis  -continue Tegretol and Briviact.         # Pain Assessment:  Current Pain Score 1/14/2019   Patient currently in pain? HECTOR   Pain score (0-10) -   Pain location -   Pain descriptors -   rFLACC pain score -       D/W: RN  DVT Prophylaxis: Pneumatic Compression Devices  Code Status: Full Code    Disposition: Expected discharge- 1/17 if blood cultures are unremarkable  and continues to improve.    Migel Stoddard MD    Interval History   Afebrile, continues to feel better.  No nausea or vomiting.    -Data reviewed today: I reviewed all new labs and imaging results over the last 24 hours. I personally reviewed no images or EKG's today.      Physical Exam   Temp: 97.7  F (36.5  C) Temp src: Oral BP: 108/56 Pulse: 88 Heart Rate: 52 Resp: 18 SpO2: 100 % O2 Device: None (Room air)    Vitals:    01/16/19 0852   Weight: 71.6 kg (157 lb 13.6 oz)     Vital Signs with Ranges  Temp:  [97.4  F (36.3  C)-98.1  F (36.7  C)] 97.7  F (36.5  C)  Pulse:  [54-88] 88  Heart Rate:  [52] 52  Resp:  [18] 18  BP: (108-131)/(45-62) 108/56  SpO2:  [96 %-100 %] 100 %  I/O last 3 completed shifts:  In: 2641 [I.V.:2311; NG/GT:330]  Out: -     Constitutional: Awake, responds appropriately by thumbs up, nonverbal  Respiratory: Coarse breath sounds bilaterally, normal effort of breathing.    Cardiovascular: RRR, No murmur  GI: Soft, Non- tender, BS- normoactive, G-tube in place  Skin/Integument: Warm and dry, no rashes  MSK: No joint deformity or swelling, no edema  Neuro: Chronic residual right-sided hemiparesis      Medications       IV fluid REPLACEMENT ONLY       sodium chloride 75 mL/hr at 01/15/19 1512         Brivaracetam  100 mg Per Feeding Tube BID     carBAMazepine  150 mg Oral Q6H     cholecalciferol  2,000 Units Per Feeding Tube QAM     hydrocortisone  10 mg Per Feeding Tube QPM     hydrocortisone  20 mg Per Feeding Tube QAM     levothyroxine  137 mcg Per Feeding Tube Daily     melatonin  6 mg Per NG tube At Bedtime     multivitamins w/minerals  15 mL Per Feeding Tube Daily     pantoprazole  40 mg Per NG tube Daily     piperacillin-tazobactam  4.5 g Intravenous Q6H     potassium & sodium phosphates  1 packet Per Feeding Tube TID     saccharomyces boulardii  250 mg Oral or Feeding Tube BID     scopolamine  1 patch Transdermal Q72H     scopolamine   Transdermal Q8H     [START ON 1/17/2019]  scopolamine   Transdermal Q72H       Data     Recent Labs   Lab 01/15/19  0954 01/14/19  1315   WBC 6.0 13.3*   HGB 10.5* 11.2*   MCV 85 85   * 82*    132*   POTASSIUM 4.1 4.4   CHLORIDE 103 95   CO2 25 30   BUN 14 20   CR 0.73 0.85   ANIONGAP 8 7   STEVE 8.0* 8.5   GLC 96 147*   ALBUMIN  --  3.0*   PROTTOTAL  --  7.9   BILITOTAL  --  0.3   ALKPHOS  --  99   ALT  --  30   AST  --  17       No results found for this or any previous visit (from the past 24 hour(s)).

## 2019-01-16 NOTE — CONSULTS
Care Transition Initial Assessment - RN        Met with: Patient and Guardian (Mother Savannah)  DATA   Active Problems:    Pneumonia       Cognitive Status: awake and alert. Nonverbal      Contact information and PCP information verified: Yes  Lives With: parent(s)(with home services)       Insurance concerns: No Insurance issues identified  ASSESSMENT  Patient currently receives the following services:   12hrs RN through [facility] agency ([phone]). The  is [facility].  11.5hrs NA through [facility] ([phone]).   Receives tube feeding supplies from [facility].  Mother has speciality braces, w/c and swivel device for pivoting.  Mother feels they are managing well in the home.   She is checking with [facility] on respite care or increasing hours.          Identified issues/concerns regarding health management: Mother states she would like PT/OT services in the home. Patient has used [facility] in the past that is affiliated with [facility].  They were unable to accept him at this time.  RN's have been working on strengthening, transfers.  Mother would like us to arrange a ride home around 11am.   Orders need to be faxed to [facility] ([phone])    PLAN  Financial costs for the patient include TBD .  Patient given options and choices for discharge yes .  Patient/family is agreeable to the plan?  Yes:   Patient anticipates discharging to home .        Patient anticipates needs for home equipment: Yes  Plan/Disposition: Home   Appointments: Mother wishes us to make a PCP appointment with Shauna Riley.        Care  (CTS) will continue to follow as needed.

## 2019-01-16 NOTE — PROGRESS NOTES
Cross Cover:  Blood cx + for gram + cocci in clusters x1 bottle (so far). Hx of staph epidermidis bacteremia in September 2018 and history of MRSA. Not currently on gram + coverage. No evidence of worsening fevers at this time.   Will have pharmacy dose vancomycin, await sensitivities of first blood culture and further positive results from second blood culture    ADDENDUM: Further results of culture show that is likely coag negative staph (most likely contaminant and is not staph epidermidis). Second blood culture remains negative for growth at 2 days. Will discontinue further doses of vancomycin as this is likely to be contaminant.

## 2019-01-16 NOTE — PLAN OF CARE
Pt alert, HECTOR orientation. Lift/assist x2, fall risk. VSS on RA. No signs of pain. Lung sounds diminished. Good, nonproductive cough. Jessica area red, rash, microguard applied. Tube feedings to be restarted at 0700. IVF infusing. IV Zosyn and Vancomycin. Discharge pending progress.

## 2019-01-16 NOTE — PROGRESS NOTES
[place] arranged with [facility] for 1/17/19 for 2:15pm to Patient's home.  Patient has his own w/c here.

## 2019-01-16 NOTE — PHARMACY-VANCOMYCIN DOSING SERVICE
Pharmacy Vancomycin Initial Note  Date of Service 2019  Patient's  1960  58 year old, male    Indication: Bacteremia    Current estimated CrCl = Estimated Creatinine Clearance: 99.1 mL/min (based on SCr of 0.73 mg/dL).    Creatinine for last 3 days  2019:  1:15 PM Creatinine 0.85 mg/dL  1/15/2019:  9:54 AM Creatinine 0.73 mg/dL    Recent Vancomycin Level(s) for last 3 days  No results found for requested labs within last 72 hours.      Vancomycin IV Administrations (past 72 hours)      No vancomycin orders with administrations in past 72 hours.                Nephrotoxins and other renal medications (From now, onward)    Start     Dose/Rate Route Frequency Ordered Stop    19 0115  vancomycin (VANCOCIN) 1000 mg in dextrose 5% 200 mL PREMIX      1,000 mg  200 mL/hr over 1 Hours Intravenous EVERY 8 HOURS 19 0100      19 2300  piperacillin-tazobactam (ZOSYN) 4.5 g vial to attach to  mL bag     Comments:  Pharmacy can adjust dose based on renal function.    4.5 g  over 30 Minutes Intravenous EVERY 6 HOURS 19 2132            Contrast Orders - past 72 hours (72h ago, onward)    None                Plan:  1.  Start vancomycin  1000 mg IV q8h.   2.  Goal Trough Level: 15-20 mg/L   3.  Pharmacy will check trough levels as appropriate in 1-3 Days.    4. Serum creatinine levels will be ordered daily for the first week of therapy and at least twice weekly for subsequent weeks.    5. Gallup method utilized to dose vancomycin therapy: Method 1    Adarsh Moncada

## 2019-01-16 NOTE — PLAN OF CARE
Pt alert,oriented to self. Non verbal,can say yes or no,give a thumbs up. TF from . Up with 2/belt,pivot tx.  HX MRSA/VRE.  Incontinent of bowel and bladder. Denies any pain. LSC,occasional npc. If stable and blood cult neg can possibly go home with his Mom tomorrow.

## 2019-01-16 NOTE — PROVIDER NOTIFICATION
MD Notification    Notified Person: MD    Notified Person Name: Dr. Claros    Notification Date/Time: 1/16/19    Notification Interaction: Telephone    Purpose of Notification: 1/14 + blood cultures, gram positive cocci clusters    Orders Received: MD will start pt on Vancomycin then await sensitivities.

## 2019-01-17 VITALS
TEMPERATURE: 97.4 F | SYSTOLIC BLOOD PRESSURE: 106 MMHG | WEIGHT: 157.85 LBS | OXYGEN SATURATION: 93 % | DIASTOLIC BLOOD PRESSURE: 57 MMHG | HEART RATE: 76 BPM | RESPIRATION RATE: 16 BRPM | BODY MASS INDEX: 21.41 KG/M2

## 2019-01-17 LAB — GLUCOSE BLDC GLUCOMTR-MCNC: 155 MG/DL (ref 70–99)

## 2019-01-17 PROCEDURE — 99239 HOSP IP/OBS DSCHRG MGMT >30: CPT | Performed by: INTERNAL MEDICINE

## 2019-01-17 PROCEDURE — 25000132 ZZH RX MED GY IP 250 OP 250 PS 637: Mod: GY | Performed by: INTERNAL MEDICINE

## 2019-01-17 PROCEDURE — 25000128 H RX IP 250 OP 636: Performed by: INTERNAL MEDICINE

## 2019-01-17 PROCEDURE — 25000125 ZZHC RX 250: Performed by: INTERNAL MEDICINE

## 2019-01-17 PROCEDURE — A9270 NON-COVERED ITEM OR SERVICE: HCPCS | Mod: GY | Performed by: INTERNAL MEDICINE

## 2019-01-17 PROCEDURE — 27210429 ZZH NUTRITION PRODUCT INTERMEDIATE LITER

## 2019-01-17 PROCEDURE — 00000146 ZZHCL STATISTIC GLUCOSE BY METER IP

## 2019-01-17 RX ORDER — LEVOFLOXACIN 750 MG/1
750 TABLET, FILM COATED ORAL DAILY
Qty: 3 TABLET | Refills: 0 | Status: SHIPPED | OUTPATIENT
Start: 2019-01-18 | End: 2019-01-31

## 2019-01-17 RX ORDER — LEVOFLOXACIN 500 MG/1
500 TABLET, FILM COATED ORAL DAILY
Qty: 3 TABLET | Refills: 0 | Status: SHIPPED | OUTPATIENT
Start: 2019-01-18 | End: 2019-01-17

## 2019-01-17 RX ADMIN — Medication 2000 UNITS: at 08:39

## 2019-01-17 RX ADMIN — CARBAMAZEPINE 150 MG: 100 SUSPENSION ORAL at 06:45

## 2019-01-17 RX ADMIN — HYDROCORTISONE 20 MG: 20 TABLET ORAL at 08:40

## 2019-01-17 RX ADMIN — BRIVARACETAM 100 MG: 10 SOLUTION ORAL at 08:39

## 2019-01-17 RX ADMIN — MULTIVITAMIN 15 ML: LIQUID ORAL at 08:39

## 2019-01-17 RX ADMIN — Medication 250 MG: at 08:39

## 2019-01-17 RX ADMIN — SCOPALAMINE 1 PATCH: 1 PATCH, EXTENDED RELEASE TRANSDERMAL at 06:57

## 2019-01-17 RX ADMIN — CARBAMAZEPINE 150 MG: 100 SUSPENSION ORAL at 00:31

## 2019-01-17 RX ADMIN — LEVOTHYROXINE SODIUM 137 MCG: 112 TABLET ORAL at 08:39

## 2019-01-17 RX ADMIN — PIPERACILLIN AND TAZOBACTAM 4.5 G: 4; .5 INJECTION, POWDER, FOR SOLUTION INTRAVENOUS at 06:45

## 2019-01-17 RX ADMIN — POTASSIUM & SODIUM PHOSPHATES POWDER PACK 280-160-250 MG 1 PACKET: 280-160-250 PACK at 08:39

## 2019-01-17 RX ADMIN — PIPERACILLIN AND TAZOBACTAM 4.5 G: 4; .5 INJECTION, POWDER, FOR SOLUTION INTRAVENOUS at 00:28

## 2019-01-17 RX ADMIN — Medication 40 MG: at 08:39

## 2019-01-17 ASSESSMENT — ACTIVITIES OF DAILY LIVING (ADL)
ADLS_ACUITY_SCORE: 38

## 2019-01-17 NOTE — PROGRESS NOTES
Patient discharge to home today.  Updated mother Savannah of ride time of 2:15.  She has now learned her nurse may not be able to come out then and is requesting an earlier time.  [facility] called and was able to move ride to 12:45.  Mother was in agreement with this time.  Updated [facility] ([facility]) that patient was discharge today and orders were faxed to [phone].  Home PT referral through [facility] already sent.  Bedside nurse updated of new time.

## 2019-01-17 NOTE — DISCHARGE SUMMARY
[facility]    Discharge Summary  Hospitalist    Date of Admission:  1/14/2019  Date of Discharge:  1/17/2019  Discharging Provider: Migel Stoddard MD    Discharge Diagnoses       Probable aspiration pneumonia.    Urinary tract infection with Pseudomonas and E. coli  History of recurrent aspiration pneumonia and chronic dysphagia.    Thrombocytopenia due to acute illness resolved.   History of panhypopituitarism.   History of seizure disorder.    History of traumatic brain injury with right-sided hemiparesis        Hospital Course   Keyon Farias is a  58-year-old gentleman with history of traumatic brain injury, seizures, recurrent aspiration pneumonia, dysphagia with a PEG tube in place who has had multiple admissions in the last 6 months, a total of 4, primarily due to recurrent pneumonia.  He is admitted with fever, increased cough with production.  A chest x-ray does not show any worsening infiltrates or effusions but appears to have a low-grade fever and increased secretions that is concerning for possible pneumonia.      Probable aspiration pneumonia.   Urinary tract infection  -Patient has recurrent aspiration pneumonias in the past  -Presents with fever cough and increasing sputum production with concerns for pneumonia.  -Chest x-ray was read as shallow inspiration with bilateral atelectasis cannot exclude pneumonia  -Initially empirically started on Zosyn  -Blood culture positive for coagulase-negative staphylococci, more than likely contaminant, repeat culture negative.  -Influenza PCR negative  -Urine culture positive for E. coli and Pseudomonas, given the susceptibilities and the concern for pneumonia antibiotic transition to Levaquin for 3 additional days.  -Clinically much improved.     History of recurrent aspiration pneumonia and chronic dysphagia.    -Continue tube feeds.       Thrombocytopenia.   -Patient's platelet counts at presentation was 82,000, improved to 130 8K     History  of panhypopituitarism.  -continue prior to admission hydrocortisone and levothyroxine.     History of seizure disorder.    Traumatic brain injury with right-sided hemiparesis  -continue Tegretol and Briviact.         # Discharge Pain Plan:   - Patient currently has NO PAIN and is not being prescribed pain medications on discharge.      Migel Stoddard MD    Significant Results and Procedures   See below    Pending Results     Unresulted Labs Ordered in the Past 30 Days of this Admission     Date and Time Order Name Status Description    1/16/2019 0735 Blood culture Preliminary     1/14/2019 1308 Blood culture Preliminary     1/14/2019 1308 Blood culture Preliminary     1/14/2019 1308 Urine Culture Preliminary           Code Status   Full Code       Primary Care Physician   Carlos Gomez    Physical Exam   Temp: 97.4  F (36.3  C) Temp src: Axillary BP: 106/57 Pulse: 76 Heart Rate: 56 Resp: 16 SpO2: 93 % O2 Device: None (Room air)      Constitutional: Awake, nonverbal, responds by gesturing thumbs up or thumbs down  Respiratory: Isolated rhonchi bilaterally, normal effort of breathing   cardiovascular: RRR, No murmur  GI: Soft, Non- tender, BS- normoactive, G-tube in place  Neuro: Chronic spastic hemiparesis of the right side    Discharge Disposition   Discharged to home  Condition at discharge: Stable    Consultations This Hospital Stay   NUTRITION SERVICES ADULT IP CONSULT  PHARMACY IP CONSULT  PHARMACY TO DOSE VANCO  CARE TRANSITION RN/SW IP CONSULT    Time Spent on this Encounter   IMigel, personally saw the patient today and spent greater than 30 minutes discharging this patient.    Discharge Orders      Follow-up and recommended labs and tests     Follow up with primary care provider, Carlos Gomez, as scheduled for you on January 23rd, 2pm  for hospital follow- up.     Follow-up and recommended labs and tests    Follow up with primary care provider, Carlos Gomez, within 7 days for hospital  follow- up.     Activity    Your activity upon discharge: activity as tolerated     Full Code     Diet    Follow this diet upon discharge: Orders Placed This Encounter      Adult Formula Drip Feeding: Continuous Isosource 1.5; Jejunostomy; Goal Rate: 100; mL/hr; From: 7:00 AM; 7:00 PM; Medication - Feeding Tube Flush Frequency: At least 15-30 mL water before and after medication administration and with tube clogging;...      NPO for Medical/Clinical Reasons Except for: NPO but receiving Tube Feeding       Discharge Medications   Current Discharge Medication List      START taking these medications    Details   levofloxacin (LEVAQUIN) 750 MG tablet Take 1 tablet (750 mg) by mouth daily for 3 days  Qty: 3 tablet, Refills: 0    Associated Diagnoses: HCAP (healthcare-associated pneumonia)         CONTINUE these medications which have NOT CHANGED    Details   bacitracin ointment Apply topically daily as needed for wound care To PEG site.      Brivaracetam (BRIVIACT) 10 MG/ML solution 100 mg by Oral or FT or NG tube route 2 times daily @0900 and 2100      calcium carbonate 1250 (500 CA) MG/5ML SUSP suspension 5 mLs (1,250 mg) by Per J Tube route 3 times daily (with meals)  Qty: 450 mL    Associated Diagnoses: Malnutrition (H)      carBAMazepine (TEGRETOL) 100 MG/5ML suspension Take 150 mg by mouth every 6 hours At 06:00, 12:00, 18:00 and 24:00 for seizures      !! hydrocortisone (CORTEF) 5 MG tablet 10 mg by Oral or FT or NG tube route every evening       !! hydrocortisone (CORTEF) 5 MG tablet 20 mg by Oral or FT or NG tube route every morning       levothyroxine (SYNTHROID/LEVOTHROID) 137 MCG tablet 137 mcg by Oral or FT or NG tube route daily      melatonin (MELATONIN) 1 MG/ML LIQD liquid 6 mg by Per NG tube route At Bedtime       Multiple Vitamins-Minerals (CENTRUM SILVER) per tablet Take 1 tablet by mouth daily Crush and feed via j-tube      pantoprazole (PROTONIX) 2 mg/mL SUSP suspension 40 mg by Per NG tube route  daily       potassium & sodium phosphates (NEUTRA-PHOS) 280-160-250 MG Packet Take 1 packet by mouth 3 times daily 0900, 1500, 2100.       saccharomyces boulardii (FLORASTOR) 250 MG capsule 1 capsule (250 mg) by Oral or Feeding Tube route 2 times daily  Qty: 60 capsule, Refills: 0    Associated Diagnoses: Altered bowel function      scopolamine (TRANSDERM-SCOP, 1.5 MG,) 72 hr patch Place 1 patch onto the skin every 72 hours      VITAMIN D, CHOLECALCIFEROL, PO Take 2,000 Units by mouth every morning       Wheat Dextrin (BENEFIBER PO) 2 teaspoonful by Per NG tube route daily       acetaminophen (TYLENOL) 500 MG tablet 1,000 mg by Oral or FT or NG tube route every 6 hours as needed for mild pain      albuterol (2.5 MG/3ML) 0.083% neb solution Take 1 vial by nebulization every 4 hours as needed for shortness of breath / dyspnea or wheezing      testosterone cypionate (DEPOTESTOTERONE CYPIONATE) 200 MG/ML injection Inject 76 mg into the muscle See Admin Instructions Every 2 weeks on Fridays   76 mg or 0.38 mL       !! - Potential duplicate medications found. Please discuss with provider.        Allergies   Allergies   Allergen Reactions     Dilantin [Phenytoin Sodium]      Valproic Acid      Toxicity c bone marrow suspension, elevated ammonia levels      Data   Most Recent 3 CBC's:  Recent Labs   Lab Test 01/16/19  0919 01/15/19  0954 01/14/19  1315   WBC 5.0 6.0 13.3*   HGB 11.7* 10.5* 11.2*   MCV 85 85 85   * 118* 82*      Most Recent 3 BMP's:  Recent Labs   Lab Test 01/15/19  0954 01/14/19  1315 12/23/18  0540    132* 138   POTASSIUM 4.1 4.4 3.9   CHLORIDE 103 95 106   CO2 25 30 25   BUN 14 20 8   CR 0.73 0.85 0.87   ANIONGAP 8 7 7   STEVE 8.0* 8.5 8.4*   GLC 96 147* 81     Most Recent 2 LFT's:  Recent Labs   Lab Test 01/14/19  1315 12/20/18  0650   AST 17 19   ALT 30 25   ALKPHOS 99 71   BILITOTAL 0.3 0.5     Most Recent INR's and Anticoagulation Dosing History:  Anticoagulation Dose History     Recent  Dosing and Labs Latest Ref Rng & Units 12/1/2016 1/22/2017 1/22/2017 1/23/2017 1/25/2017 1/30/2017 2/7/2017    INR 0.86 - 1.14 1.30(H) 2.48(H) 2.58(H) 1.53(H) 1.49(H) 1.32(H) 1.27(H)        Most Recent 3 Troponin's:  Recent Labs   Lab Test 01/22/17  0500 01/21/17  2348 01/21/17  1600   TROPI 0.017 0.025 0.028     Most Recent Cholesterol Panel:  Recent Labs   Lab Test 11/29/16  0430   TRIG 100     Most Recent 6 Bacteria Isolates From Any Culture (See EPIC Reports for Culture Details):  Recent Labs   Lab Test 01/16/19  0918 01/14/19  1550 01/14/19  1315 12/21/18  1527 12/21/18  1519 12/18/18  0225   CULT No growth after 18 hours Cultured on the 2nd day of incubation:  Gram positive cocci in clusters  *  Critical Value/Significant Value, preliminary result only, called to and read back by  Melissa Noble RN 0027 1/16/19. MS    Susceptibility testing in progress  (Note)  POSITIVE for Staphylococci other than S.aureus, S.epidermidis and  S.lugdunensis, by [employer] multiplex nucleic acid test.  Coagulase-negative staphylococci are the most common venipuncture or  collection associated skin CONTAMINANTS grown in blood cultures.  Final identification and antimicrobial susceptibility testing will be  verified by standard methods.    Specimen tested with Verigene multiplex, gram-positive blood culture  nucleic acid test for the following targets: Staph aureus, Staph  epidermidis, Staph lugdunensis, other Staph species, Enterococcus  faecalis, Enterococcus faecium, Streptococcus species, S. agalactiae,  S. anginosus grp., S. pneumoniae, S. pyogenes, Listeria sp., mecA  (methicillin resistance) and Alberto/B (vancomycin resistance).    Critical Value/Significant Value called to and read back by Jose Renee RN  01.16.19 CF   >100,000 colonies/mL  Escherichia coli  Additional susceptibilities in progress  1.17  *  >100,000 colonies/mL  Pseudomonas aeruginosa  Additional susceptibilities in progress  1.17  *  No  growth after 3 days No growth No growth No growth     Most Recent TSH, T4 and A1c Labs:  Recent Labs   Lab Test 11/22/18  1438 07/05/18  0021   TSH  --  <0.01*   T4  --  1.66*   A1C 6.3*  --        Results for orders placed or performed during the hospital encounter of 01/14/19   XR Chest 2 Views    Narrative    CHEST TWO VIEWS  1/14/2019 3:14 PM     HISTORY: 58-year-old patient with cough.       Impression    IMPRESSION: Since December 21, 2018, persistent shallow inspiration.  Scattered bibasilar opacities are comparable to previous exam, perhaps  atelectasis or scar formation. Underlying pneumonia cannot be  excluded. No pneumothorax or pleural effusion.    EDER SINHA MD

## 2019-01-17 NOTE — PLAN OF CARE
HECTOR pt orientation, nonverbal thumbs up or down. Assist x2, fall risk. VSS on RA. No complaints of pain. Incontinent of bowel and bladder. Contact isolation. Tube feed from [phone]. Plan for discharge home with mom today.

## 2019-01-18 NOTE — PLAN OF CARE
Pt discharged back to his home. HIs mother was called and I went over his AVS with her. Meds sent kannan pt. Pt transferred via his own w/c with [facility]. Pt denies any pain. All belongings accompanied pt.

## 2019-01-20 LAB
BACTERIA SPEC CULT: NO GROWTH
Lab: NORMAL
SPECIMEN SOURCE: NORMAL

## 2019-01-24 LAB
BACTERIA SPEC CULT: ABNORMAL
BACTERIA SPEC CULT: ABNORMAL
Lab: ABNORMAL
SPECIMEN SOURCE: ABNORMAL

## 2019-01-25 NOTE — PROCEDURES
Procedure Date: 2018      ELECTROENCEPHALOGRAM        EEG #       This is a portable EEG.      DATE OF RECORDIN2018      CLINICAL SUMMARY:  The patient is a 58-year-old male with history of TBI, leading to spastic hemiplegia and seizures.  EEG was performed to evaluate for seizures.     TECHNICAL SUMMARY:  This EEG monitoring was performed with 23 scalp electrodes in the 10-20 system of placement and additional scalp, precordial and other surface electrodes were used for electrical referencing and artifact detection.      INTERICTAL ACTIVITY:  No posterior-dominant rhythm was appreciated.  There was an asymmetry with decreased amplitude in the left hemisphere.  Nearly continuous delta slowing was seen throughout the recording.  High amplitude delta activities over the right hemisphere often were sharply contoured, and at times they came with rhythmic discharges, maximal over right temporal region.  At times, sharp transients were present over the right anterior regions, but they did not appear to be epileptogenic.      CLINICAL/ICTAL EVENTS:  No electrographic or clinical seizures were recorded.      IMPRESSION:  This is an abnormal portable EEG due to the presence of an asymmetry with decreased amplitude over the left hemisphere consistent with the patient's history of TBI and diffuse delta slowing consistent with severe diffuse nonspecific encephalopathy.  No epileptiform discharges were present.  No electrographic or clinical seizures were recorded.         DORYS KAMINSKI MD             D: 2019   T: 2019   MT: ARIANA      Name:     BERT BARAJAS   MRN:      [id number]        Account:        [id number]   :      1960           Procedure Date: 2018      Document: [id number]

## 2019-01-31 ENCOUNTER — APPOINTMENT (OUTPATIENT)
Dept: GENERAL RADIOLOGY | Facility: CLINIC | Age: 59
End: 2019-01-31
Payer: MEDICARE

## 2019-01-31 ENCOUNTER — HOSPITAL ENCOUNTER (OUTPATIENT)
Facility: CLINIC | Age: 59
Setting detail: OBSERVATION
Discharge: HOME | End: 2019-02-01
Attending: EMERGENCY MEDICINE | Admitting: INTERNAL MEDICINE
Payer: MEDICARE

## 2019-01-31 DIAGNOSIS — J18.9 HCAP (HEALTHCARE-ASSOCIATED PNEUMONIA): ICD-10-CM

## 2019-01-31 DIAGNOSIS — R05.9 COUGH: ICD-10-CM

## 2019-01-31 DIAGNOSIS — J69.0 ASPIRATION PNEUMONIA OF BOTH LOWER LOBES DUE TO GASTRIC SECRETIONS (H): Primary | ICD-10-CM

## 2019-01-31 DIAGNOSIS — D72.829 LEUKOCYTOSIS, UNSPECIFIED TYPE: ICD-10-CM

## 2019-01-31 LAB
ALBUMIN SERPL-MCNC: 2.9 G/DL (ref 3.4–5)
ALBUMIN UR-MCNC: NEGATIVE MG/DL
ALP SERPL-CCNC: 87 U/L (ref 40–150)
ALT SERPL W P-5'-P-CCNC: 23 U/L (ref 0–70)
AMORPH CRY #/AREA URNS HPF: ABNORMAL /HPF
ANION GAP SERPL CALCULATED.3IONS-SCNC: 7 MMOL/L (ref 3–14)
APPEARANCE UR: ABNORMAL
AST SERPL W P-5'-P-CCNC: 16 U/L (ref 0–45)
BASOPHILS # BLD AUTO: 0.1 10E9/L (ref 0–0.2)
BASOPHILS NFR BLD AUTO: 0.6 %
BILIRUB SERPL-MCNC: 0.2 MG/DL (ref 0.2–1.3)
BILIRUB UR QL STRIP: NEGATIVE
BUN SERPL-MCNC: 14 MG/DL (ref 7–30)
CALCIUM SERPL-MCNC: 8.5 MG/DL (ref 8.5–10.1)
CHLORIDE SERPL-SCNC: 98 MMOL/L (ref 94–109)
CO2 SERPL-SCNC: 27 MMOL/L (ref 20–32)
COLOR UR AUTO: YELLOW
CREAT SERPL-MCNC: 0.72 MG/DL (ref 0.66–1.25)
CRP SERPL-MCNC: 71.1 MG/L (ref 0–8)
DIFFERENTIAL METHOD BLD: ABNORMAL
EOSINOPHIL # BLD AUTO: 0.6 10E9/L (ref 0–0.7)
EOSINOPHIL NFR BLD AUTO: 4.6 %
ERYTHROCYTE [DISTWIDTH] IN BLOOD BY AUTOMATED COUNT: 14.1 % (ref 10–15)
FLUAV+FLUBV AG SPEC QL: NEGATIVE
FLUAV+FLUBV AG SPEC QL: NEGATIVE
GFR SERPL CREATININE-BSD FRML MDRD: >90 ML/MIN/{1.73_M2}
GLUCOSE SERPL-MCNC: 131 MG/DL (ref 70–99)
GLUCOSE UR STRIP-MCNC: 150 MG/DL
HCT VFR BLD AUTO: 35.7 % (ref 40–53)
HGB BLD-MCNC: 12 G/DL (ref 13.3–17.7)
HGB UR QL STRIP: NEGATIVE
IMM GRANULOCYTES # BLD: 0.1 10E9/L (ref 0–0.4)
IMM GRANULOCYTES NFR BLD: 0.4 %
KETONES UR STRIP-MCNC: NEGATIVE MG/DL
LACTATE BLD-SCNC: 1.7 MMOL/L (ref 0.7–2)
LEUKOCYTE ESTERASE UR QL STRIP: NEGATIVE
LYMPHOCYTES # BLD AUTO: 2.7 10E9/L (ref 0.8–5.3)
LYMPHOCYTES NFR BLD AUTO: 20 %
MCH RBC QN AUTO: 28.3 PG (ref 26.5–33)
MCHC RBC AUTO-ENTMCNC: 33.6 G/DL (ref 31.5–36.5)
MCV RBC AUTO: 84 FL (ref 78–100)
MONOCYTES # BLD AUTO: 1.5 10E9/L (ref 0–1.3)
MONOCYTES NFR BLD AUTO: 11.1 %
MUCOUS THREADS #/AREA URNS LPF: PRESENT /LPF
NEUTROPHILS # BLD AUTO: 8.6 10E9/L (ref 1.6–8.3)
NEUTROPHILS NFR BLD AUTO: 63.3 %
NITRATE UR QL: NEGATIVE
NRBC # BLD AUTO: 0 10*3/UL
NRBC BLD AUTO-RTO: 0 /100
PH UR STRIP: 8 PH (ref 5–7)
PLATELET # BLD AUTO: 240 10E9/L (ref 150–450)
POTASSIUM SERPL-SCNC: 4.2 MMOL/L (ref 3.4–5.3)
PROCALCITONIN SERPL-MCNC: 0.07 NG/ML
PROT SERPL-MCNC: 7.5 G/DL (ref 6.8–8.8)
RBC # BLD AUTO: 4.24 10E12/L (ref 4.4–5.9)
RBC #/AREA URNS AUTO: <1 /HPF (ref 0–2)
SODIUM SERPL-SCNC: 132 MMOL/L (ref 133–144)
SOURCE: ABNORMAL
SP GR UR STRIP: 1.01 (ref 1–1.03)
SPECIMEN SOURCE: NORMAL
UROBILINOGEN UR STRIP-MCNC: NORMAL MG/DL (ref 0–2)
WBC # BLD AUTO: 13.6 10E9/L (ref 4–11)
WBC #/AREA URNS AUTO: 1 /HPF (ref 0–5)

## 2019-01-31 PROCEDURE — 25000128 H RX IP 250 OP 636: Performed by: PHYSICIAN ASSISTANT

## 2019-01-31 PROCEDURE — 99285 EMERGENCY DEPT VISIT HI MDM: CPT | Mod: 25

## 2019-01-31 PROCEDURE — 96365 THER/PROPH/DIAG IV INF INIT: CPT

## 2019-01-31 PROCEDURE — 25000132 ZZH RX MED GY IP 250 OP 250 PS 637: Mod: GY | Performed by: PHYSICIAN ASSISTANT

## 2019-01-31 PROCEDURE — 83605 ASSAY OF LACTIC ACID: CPT | Performed by: EMERGENCY MEDICINE

## 2019-01-31 PROCEDURE — 87040 BLOOD CULTURE FOR BACTERIA: CPT | Mod: 91 | Performed by: EMERGENCY MEDICINE

## 2019-01-31 PROCEDURE — 80053 COMPREHEN METABOLIC PANEL: CPT | Performed by: EMERGENCY MEDICINE

## 2019-01-31 PROCEDURE — 36415 COLL VENOUS BLD VENIPUNCTURE: CPT

## 2019-01-31 PROCEDURE — G0378 HOSPITAL OBSERVATION PER HR: HCPCS

## 2019-01-31 PROCEDURE — A9270 NON-COVERED ITEM OR SERVICE: HCPCS | Mod: GY | Performed by: PHYSICIAN ASSISTANT

## 2019-01-31 PROCEDURE — 84145 PROCALCITONIN (PCT): CPT | Performed by: EMERGENCY MEDICINE

## 2019-01-31 PROCEDURE — 96376 TX/PRO/DX INJ SAME DRUG ADON: CPT

## 2019-01-31 PROCEDURE — 40000275 ZZH STATISTIC RCP TIME EA 10 MIN

## 2019-01-31 PROCEDURE — 71046 X-RAY EXAM CHEST 2 VIEWS: CPT

## 2019-01-31 PROCEDURE — 96361 HYDRATE IV INFUSION ADD-ON: CPT

## 2019-01-31 PROCEDURE — 25000128 H RX IP 250 OP 636: Performed by: EMERGENCY MEDICINE

## 2019-01-31 PROCEDURE — 85025 COMPLETE CBC W/AUTO DIFF WBC: CPT | Performed by: EMERGENCY MEDICINE

## 2019-01-31 PROCEDURE — 81001 URINALYSIS AUTO W/SCOPE: CPT | Performed by: EMERGENCY MEDICINE

## 2019-01-31 PROCEDURE — 99220 ZZC INITIAL OBSERVATION CARE,LEVL III: CPT | Performed by: PHYSICIAN ASSISTANT

## 2019-01-31 PROCEDURE — 25000125 ZZHC RX 250: Performed by: PHYSICIAN ASSISTANT

## 2019-01-31 PROCEDURE — 87804 INFLUENZA ASSAY W/OPTIC: CPT | Mod: 91 | Performed by: EMERGENCY MEDICINE

## 2019-01-31 PROCEDURE — 86140 C-REACTIVE PROTEIN: CPT | Performed by: EMERGENCY MEDICINE

## 2019-01-31 RX ORDER — PIPERACILLIN SODIUM, TAZOBACTAM SODIUM 4; .5 G/20ML; G/20ML
4.5 INJECTION, POWDER, LYOPHILIZED, FOR SOLUTION INTRAVENOUS EVERY 6 HOURS
Status: DISCONTINUED | OUTPATIENT
Start: 2019-01-31 | End: 2019-02-01 | Stop reason: HOSPADM

## 2019-01-31 RX ORDER — GLYCOPYRROLATE 0.2 MG/ML
0.1 INJECTION, SOLUTION INTRAMUSCULAR; INTRAVENOUS ONCE
COMMUNITY
End: 2019-01-31

## 2019-01-31 RX ORDER — HYDROCORTISONE 10 MG/1
10 TABLET ORAL EVERY EVENING
Status: DISCONTINUED | OUTPATIENT
Start: 2019-01-31 | End: 2019-02-01 | Stop reason: HOSPADM

## 2019-01-31 RX ORDER — ALBUTEROL SULFATE 0.83 MG/ML
2.5 SOLUTION RESPIRATORY (INHALATION) EVERY 4 HOURS PRN
Status: DISCONTINUED | OUTPATIENT
Start: 2019-01-31 | End: 2019-02-01 | Stop reason: HOSPADM

## 2019-01-31 RX ORDER — HYDROCORTISONE 10 MG/1
20 TABLET ORAL EVERY MORNING
Status: DISCONTINUED | OUTPATIENT
Start: 2019-02-01 | End: 2019-02-01 | Stop reason: HOSPADM

## 2019-01-31 RX ORDER — GLYCOPYRROLATE 1 MG/1
2 TABLET ORAL 3 TIMES DAILY
Status: ON HOLD | COMMUNITY
End: 2019-02-20

## 2019-01-31 RX ORDER — PIPERACILLIN SODIUM, TAZOBACTAM SODIUM 3; .375 G/15ML; G/15ML
3.38 INJECTION, POWDER, LYOPHILIZED, FOR SOLUTION INTRAVENOUS ONCE
Status: COMPLETED | OUTPATIENT
Start: 2019-01-31 | End: 2019-01-31

## 2019-01-31 RX ORDER — LIDOCAINE 40 MG/G
CREAM TOPICAL
Status: DISCONTINUED | OUTPATIENT
Start: 2019-01-31 | End: 2019-02-01 | Stop reason: HOSPADM

## 2019-01-31 RX ORDER — WHEAT DEXTRIN 3 G/3.8 G
2 POWDER (GRAM) ORAL DAILY
Status: DISCONTINUED | OUTPATIENT
Start: 2019-01-31 | End: 2019-01-31 | Stop reason: CLARIF

## 2019-01-31 RX ORDER — SACCHAROMYCES BOULARDII 250 MG
250 CAPSULE ORAL DAILY
Status: DISCONTINUED | OUTPATIENT
Start: 2019-02-01 | End: 2019-02-01 | Stop reason: HOSPADM

## 2019-01-31 RX ORDER — BACITRACIN ZINC 500 [USP'U]/G
OINTMENT TOPICAL DAILY PRN
Status: DISCONTINUED | OUTPATIENT
Start: 2019-01-31 | End: 2019-02-01 | Stop reason: HOSPADM

## 2019-01-31 RX ORDER — CALCIUM CARBONATE 1250 MG/5ML
1250 SUSPENSION ORAL
Status: DISCONTINUED | OUTPATIENT
Start: 2019-01-31 | End: 2019-02-01 | Stop reason: HOSPADM

## 2019-01-31 RX ORDER — LANOLIN ALCOHOL/MO/W.PET/CERES
6 CREAM (GRAM) TOPICAL AT BEDTIME
Status: DISCONTINUED | OUTPATIENT
Start: 2019-01-31 | End: 2019-02-01 | Stop reason: HOSPADM

## 2019-01-31 RX ORDER — CARBAMAZEPINE 100 MG/5ML
150 SUSPENSION ORAL EVERY 6 HOURS
Status: DISCONTINUED | OUTPATIENT
Start: 2019-01-31 | End: 2019-02-01 | Stop reason: HOSPADM

## 2019-01-31 RX ORDER — SACCHAROMYCES BOULARDII 250 MG
250 CAPSULE ORAL DAILY
COMMUNITY
End: 2019-03-30

## 2019-01-31 RX ORDER — GLYCOPYRROLATE 1 MG/1
2 TABLET ORAL 3 TIMES DAILY
Status: DISCONTINUED | OUTPATIENT
Start: 2019-01-31 | End: 2019-02-01 | Stop reason: HOSPADM

## 2019-01-31 RX ORDER — ACETAMINOPHEN 500 MG
1000 TABLET ORAL EVERY 6 HOURS PRN
Status: DISCONTINUED | OUTPATIENT
Start: 2019-01-31 | End: 2019-01-31

## 2019-01-31 RX ADMIN — HYDROCORTISONE 10 MG: 10 TABLET ORAL at 20:23

## 2019-01-31 RX ADMIN — POTASSIUM & SODIUM PHOSPHATES POWDER PACK 280-160-250 MG 1 PACKET: 280-160-250 PACK at 20:25

## 2019-01-31 RX ADMIN — SODIUM CHLORIDE 1000 ML: 9 INJECTION, SOLUTION INTRAVENOUS at 13:37

## 2019-01-31 RX ADMIN — PIPERACILLIN AND TAZOBACTAM 4.5 G: 4; .5 INJECTION, POWDER, FOR SOLUTION INTRAVENOUS at 22:33

## 2019-01-31 RX ADMIN — SODIUM BICARBONATE 20 MG: 84 INJECTION, SOLUTION INTRAVENOUS at 20:04

## 2019-01-31 RX ADMIN — PIPERACILLIN SODIUM,TAZOBACTAM SODIUM 3.38 G: 3; .375 INJECTION, POWDER, FOR SOLUTION INTRAVENOUS at 16:54

## 2019-01-31 RX ADMIN — CALCIUM CARBONATE 1250 MG: 1250 SUSPENSION ORAL at 20:16

## 2019-01-31 RX ADMIN — MELATONIN 6 MG: 3 TAB ORAL at 21:26

## 2019-01-31 RX ADMIN — GLYCOPYRROLATE 2 MG: 1 TABLET ORAL at 20:20

## 2019-01-31 RX ADMIN — BRIVARACETAM 100 MG: 10 SOLUTION ORAL at 22:36

## 2019-01-31 RX ADMIN — CARBAMAZEPINE 150 MG: 100 SUSPENSION ORAL at 19:08

## 2019-01-31 ASSESSMENT — ENCOUNTER SYMPTOMS
FATIGUE: 1
COUGH: 1
FEVER: 0
ABDOMINAL PAIN: 0
WEAKNESS: 1

## 2019-01-31 ASSESSMENT — MIFFLIN-ST. JEOR: SCORE: 1540.38

## 2019-01-31 NOTE — PHARMACY-ADMISSION MEDICATION HISTORY
Admission medication history interview status for the 1/31/2019  admission is complete. See EPIC admission navigator for prior to admission medications     Medication history source reliability:Good    Actions taken by pharmacist (provider contacted, etc): Interviewed patient's mother who knew all medications well.     Additional medication history information not noted on PTA med list : Recently started glycopyrrolate 2 mg TID in replace of the scopolamine patches for secretions.     Medication reconciliation/reorder completed by provider prior to medication history? No    Time spent in this activity: 20 minutes    Prior to Admission medications    Medication Sig Last Dose Taking? Auth Provider   bacitracin ointment Apply topically daily as needed for wound care To PEG site. 1/31/2019 at Unknown time Yes Unknown, Entered By History   Brivaracetam (BRIVIACT) 10 MG/ML solution 100 mg by Oral or FT or NG tube route 2 times daily @0900 and 2100 1/31/2019 at 0900 Yes Reported, Patient   calcium carbonate 1250 (500 CA) MG/5ML SUSP suspension 5 mLs (1,250 mg) by Per J Tube route 3 times daily (with meals) 1/31/2019 at 0900 Yes Mariana Venegas MD   carBAMazepine (TEGRETOL) 100 MG/5ML suspension Take 150 mg by mouth every 6 hours At 06:00, 12:00, 18:00 and 24:00 for seizures 1/31/2019 at 1200 Yes Unknown, Entered By History   glycopyrrolate (ROBINUL) 1 MG tablet 2 mg by Oral or Feeding Tube route 3 times daily For 30 days. Please do not exceed 8 mg daily 1/31/2019 at 0900 Yes Unknown, Entered By History   hydrocortisone (CORTEF) 5 MG tablet 10 mg by Oral or FT or NG tube route every evening  1/30/2019 at 2100 Yes Unknown, Entered By History   hydrocortisone (CORTEF) 5 MG tablet 20 mg by Oral or FT or NG tube route every morning  1/31/2019 at 0900 Yes Unknown, Entered By History   levothyroxine (SYNTHROID/LEVOTHROID) 137 MCG tablet 137 mcg by Oral or FT or NG tube route daily 1/31/2019 at 0500 Yes Unknown, Entered  By History   melatonin (MELATONIN) 1 MG/ML LIQD liquid 6 mg by Per NG tube route At Bedtime  1/30/2019 at 2100 Yes Unknown, Entered By History   Multiple Vitamins-Minerals (CENTRUM SILVER) per tablet Take 1 tablet by mouth daily Crush and feed via j-tube 1/31/2019 at 0900 Yes Unknown, Entered By History   pantoprazole (PROTONIX) 2 mg/mL SUSP suspension 20 mg by Per NG tube route 2 times daily  1/31/2019 at 0900 Yes Unknown, Entered By History   potassium & sodium phosphates (NEUTRA-PHOS) 280-160-250 MG Packet Take 1 packet by mouth 3 times daily 0900, 1500, 2100.  1/31/2019 at 0900 Yes Unknown, Entered By History   saccharomyces boulardii (FLORASTOR) 250 MG capsule 250 mg by Oral or Feeding Tube route daily 1/31/2019 at 0900 Yes Unknown, Entered By History   Vitamin D, Cholecalciferol, 1000 units TABS Take 2,000 Units by mouth every morning Crush and feed via j-tube 1/31/2019 at 0900 Yes Unknown, Entered By History   Wheat Dextrin (BENEFIBER) POWD 2 teaspoonful by Per NG tube route daily  1/31/2019 at 0900 Yes Unknown, Entered By History   acetaminophen (TYLENOL) 500 MG tablet 1,000 mg by Oral or FT or NG tube route every 6 hours as needed for mild pain  at prn  Unknown, Entered By History   albuterol (2.5 MG/3ML) 0.083% neb solution Take 1 vial by nebulization every 4 hours as needed for shortness of breath / dyspnea or wheezing  at prn  Unknown, Entered By History   testosterone cypionate (DEPOTESTOTERONE CYPIONATE) 200 MG/ML injection Inject 76 mg into the muscle See Admin Instructions Every 2 weeks on Fridays   76 mg or 0.38 mL Unknown at Unknown time  Unknown, Entered By History     Myah Rubio, PharmD   [phone]

## 2019-01-31 NOTE — PROGRESS NOTES
RECEIVING UNIT ED HANDOFF REVIEW    ED Nurse Handoff Report was reviewed by: Ana Abbasi on January 31, 2019 at 4:55 PM

## 2019-01-31 NOTE — ED PROVIDER NOTES
History     Chief Complaint:  Cough    The history is provided by the patient and a parent.      Keyon Farias is a nonverbal 58 year old male with a history of traumatic brain injury with right sided hemiparesis, seizure disorder, panhypopituitarism, and dysphagia resulting in G tube dependence who presents via EMS with cough. Per chart review, patient has had multiple admissions for aspiration pneumonia and was recently hospitalized from 01/14/19-01/17/19 due to aspiration pneumonia and urinary tract infection. The patient lives at home with his mom and she called paramedics because she was concerned about his increased cough and secretions starting today. Additionally, his mom was concerned about increased weakness and fatigue. There have been no documented temperatures >100.4F and patient denies complaint including pain, though he is able to provide only limited history.    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  DVT  Gastro-oesophageal reflux disease  Panhypopituitarism  Seizures  Spastic hemiplegia affecting dominant side  Thyroid disease  Traumatic brain injury  Cerebral artery occlusion with cerebral infarction  Ventricular fibrillation  Ventricular tachyarrhythmia  PEG tube in place    Past Surgical History:    Endoscopic ultrasound upper gastrointestinal tract  Head and neck surgery  IR gastro jejunostomy tube change  Laparoscopic appendectomy  Orthopedic surgery  Laparoscopic assisted insertion tube gastrotomy  Tracheostomy x2  Vascular surgery    Family History:    Father: cancer    Social History:   The patient was brought in by EMS accompanied by mother. Lives at home with mother.  Smoking Status: Former smoker  Smokeless Tobacco: Never Used  Alcohol Use:  "Negative  Drug Use: Negative  PCP: Carlos Gomez  Marital Status:  Single     Review of Systems   Unable to perform ROS: Patient nonverbal   Constitutional: Positive for fatigue (per mother). Negative for fever.   Respiratory: Positive for cough.    Gastrointestinal: Negative for abdominal pain.   Neurological: Positive for weakness (generalized, per mother).     Physical Exam     Patient Vitals for the past 24 hrs:   BP Temp Temp src Pulse Heart Rate Resp SpO2 Height Weight   01/31/19 1615 129/67 -- -- 77 -- -- 99 % -- --   01/31/19 1600 125/70 -- -- 86 -- -- 98 % -- --   01/31/19 1545 120/69 -- -- 81 -- -- 98 % -- --   01/31/19 1530 127/72 -- -- 76 -- -- 96 % -- --   01/31/19 1515 130/68 -- -- 81 -- -- 100 % -- --   01/31/19 1500 113/68 -- -- 81 -- -- 97 % -- --   01/31/19 1445 125/73 -- -- 81 -- -- 98 % -- --   01/31/19 1430 127/73 -- -- 85 -- -- 98 % -- --   01/31/19 1415 124/73 -- -- 87 -- -- 98 % -- --   01/31/19 1345 112/73 -- -- 93 -- -- 96 % -- --   01/31/19 1330 -- -- -- -- -- -- 96 % -- --   01/31/19 1315 -- -- -- -- -- -- 97 % -- --   01/31/19 1300 132/77 -- -- -- -- -- 96 % -- --   01/31/19 1257 132/77 98.7  F (37.1  C) Temporal -- 110 16 98 % 1.753 m (5' 9\") 72 kg (158 lb 11.7 oz)       Physical Exam  General: Well-developed and well-nourished. Well appearing middle aged  man. Cooperative.  Head:  Atraumatic.  Eyes:  Conjunctivae, lids, and sclerae are normal.  ENT:    Normal nose. Moist mucous membranes. Secretions noted in posterior oropharynx.  Neck:  Supple. Normal range of motion. Old tracheostomy scar.  CV:  Regular rate and rhythm. Normal heart sounds with no murmurs, rubs, or gallops detected.  Resp:  No respiratory distress. Occasional coarse breath sounds without decreased breath sounds, wheezing or rales.  GI:  Soft. Non-distended. Non-tender. Left sided G tube without surrounding cellulitis.  :  Condom catheter held in place with tape.  MS:  Normal ROM. No bilateral lower " extremity edema.  Skin:  Warm. Non-diaphoretic. No pallor.  Neuro: Awake and alert. Nonverbal. Nods yes/no and gives thumbs up. Right sided hemiparesis.  Psych:  Normal mood and affect.   Vitals reviewed.    Emergency Department Course     Imaging:  Radiology findings were communicated with the patient who voiced understanding of the findings.    XR Chest 2 Views  Basilar atelectasis versus pneumonia.  SAMEER DELGADO MD  Reading per radiology    Laboratory:  Laboratory findings were communicated with the patient who voiced understanding of the findings.    UA with microscopic: glucose 150(A), pH 8.0(A), mucous present(A), amorphous Crystals(A) o/w WNL  Blood culture x2: pending  Procalcitonin: 0.07  Lactic Acid (Resulted: 1410): 1.7  CRP: 71.1(H)  CBC: WBC 13.6(H), HGB 12.0(L),   CMP: sodium 132(L), glucose 131(H), albumin 2.9(L) o/w WNL (Creatinine 0.72)  Influenza A/B antigen: influenza A negative, influenza B negative    Interventions:  1337 NS 1000 ml IV  1654 Zosyn 3.375 g IV    Emergency Department Course:    1259 Nursing notes and vitals reviewed.    1308 A nasal sample was obtained for laboratory testing as documented above.    1320 I performed an exam of the patient as documented above.     1414 IV was inserted and blood was drawn for laboratory testing, results above.    1427 The patient provided a urine sample here in the emergency department. This was sent for laboratory testing, findings above.    1555 Rechecked and updated. I talked with the patient and his mother and they comfortable with admission.    1603 I spoke with Dr. Lujan of the hospitalist service regarding patient's presentation, findings, and plan of care.    I personally reviewed the laboratory and imaging results with the patient and his mother and answered all related questions prior to admission.    Impression & Plan      Medical Decision Making:  Keyon is a 58 year old man with multiple chronic medical conditions as above who  presents via ambulance for worsening cough today.  He lives at home with his mother and is nonverbal and unable to provide any supplemental history.  Mother was concerned he seems somewhat weak or tired today in addition to worsening cough, though patient appears well on exam.  He does have some secretions in his posterior oropharynx and a coarse, wet sounding cough but he does not have any respiratory distress and does not require any airway intervention or supplemental O2.  Occasional coarse breath sounds are noted without calvin wheezing or rales. He is able to nod yes and no and does not seem to have any complaints.  He was recently admitted for urinary tract infection and aspiration pneumonia which is a recurrent issue for him.  Fortunately, today, there is no evidence of urinalysis without pyuria or bacteriuria.  I did obtain blood cultures given his history, particularly as he has a new leukocytosis to 13.6 today compared to 5 upon recent discharge but he is afebrile. There is no evidence of severe sepsis or hypoperfusion with a normal blood pressure and normal lactate. There is no indication for stress dose steroids. A very mild hyponatremia to 132 is likely to resolve with fluids that were administered in the emergency department and there are no other significant abnormalities requiring attention including no evidence of hepatitis or biliary obstruction and no ALMAS.  CRP is minimally elevated at 71 which is actually improved from when it was last checked.  His procalcitonin is quite low at 0.07.  It should be noted that influenza is negative though chest x-ray is revealing basilar atelectasis or pneumonia.  Although it is unclear to me if this is atelectasis or pneumonia, I think it is most prudent to treat as pneumonia given his history of recurrent aspiration pneumonias and a new leukocytosis.  While treating empirically for pneumonia (HCAP vs. aspiration), with his worsening cough and underlying  comorbidities, I believe he warrants close observation in the hospital.  I have discussed the results and this plan with the patient and his mother and answered all questions.  They verbalized understanding and are amenable.  Zosyn was initiated in the emergency department.  I discussed patient's case with Dr. Lujan, hospitalist, who accepts admission and has no further orders.    Diagnosis:    ICD-10-CM    1. HCAP (healthcare-associated pneumonia) J18.9 Procalcitonin   2. Cough R05    3. Leukocytosis, unspecified type D72.829      Disposition:   The patient is admitted into the care of Dr. Lujan.    Scribe Disclosure:  I, Joy Sims, am serving as a scribe at 3:45 PM on 1/31/2019 to document services personally performed by Louise Soliz MD based on my observations and the provider's statements to me.     EMERGENCY DEPARTMENT       Louise Soliz MD  01/31/19 2001

## 2019-01-31 NOTE — ED NOTES
"[facility]  ED Nurse Handoff Report    ED Chief complaint: Cough (wet cough started today) and Fever      ED Diagnosis:   Final diagnoses:   None       Code Status: Full Code    Allergies:   Allergies   Allergen Reactions     Dilantin [Phenytoin Sodium]      Valproic Acid      Toxicity c bone marrow suspension, elevated ammonia levels        Activity level - Baseline/Home:  Total Care    Activity Level - Current:   Total Care     Needed?: No    Isolation: Yes  Infection: Not Applicable  MRSA  VRE  Bariatric?: No    Vital Signs:   Vitals:    01/31/19 1445 01/31/19 1500 01/31/19 1515 01/31/19 1530   BP: 125/73 113/68 130/68 127/72   Pulse: 81 81 81 76   Resp:       Temp:       TempSrc:       SpO2: 98% 97% 100% 96%   Weight:       Height:           Cardiac Rhythm: ,        Pain level:      Is this patient confused?: nonverbal, able to communicate needs at times through hand motions  Does this patient have a guardian?  Yes         If yes, is there guardianship documents in the Epic \"Code/ACP\" activity?  Yes         Guardian Notified?  Yes mother - at bedside  [place] - Suicide Severity Rating Scale Completed?  No, secondary to nonverbal  If yes, what color did the patient score?  N/A    Patient Report: Initial Complaint: Cough, fever  Focused Assessment: Pt brought in by mom for increased cough, fever and lethargy. Pt is nonverbal, hx of traumatic brain injury, gross movement of upper extremities only. Pt lives at home with mom (guardian), lift used for transfer at home. O2 sat WDL on room air, pt uses suction on own to clear secretions.   Tests Performed: lab, UA, chest XR  Abnormal Results:   Labs Ordered and Resulted from Time of ED Arrival Up to the Time of Departure from the ED   ROUTINE UA WITH MICROSCOPIC - Abnormal; Notable for the following components:       Result Value    Glucose Urine 150 (*)     pH Urine 8.0 (*)     Mucous Urine Present (*)     Amorphous Crystals Few (*)     All " other components within normal limits   COMPREHENSIVE METABOLIC PANEL - Abnormal; Notable for the following components:    Sodium 132 (*)     Glucose 131 (*)     Albumin 2.9 (*)     All other components within normal limits   CBC WITH PLATELETS DIFFERENTIAL - Abnormal; Notable for the following components:    WBC 13.6 (*)     RBC Count 4.24 (*)     Hemoglobin 12.0 (*)     Hematocrit 35.7 (*)     Absolute Neutrophil 8.6 (*)     Absolute Monocytes 1.5 (*)     All other components within normal limits   CRP INFLAMMATION - Abnormal; Notable for the following components:    CRP Inflammation 71.1 (*)     All other components within normal limits   LACTIC ACID WHOLE BLOOD   PROCALCITONIN   PERIPHERAL IV CATHETER   CARDIAC CONTINUOUS MONITORING   PULSE OXIMETRY NURSING   INFLUENZA A/B ANTIGEN   BLOOD CULTURE   BLOOD CULTURE       Treatments provided: IV fluid & medication, suction    Family Comments: Mom (guardian) at bedside    OBS brochure/video discussed/provided to patient/family: Yes              Name of person given brochure if not patient:  mom              Relationship to patient: mother/guardian    ED Medications:   Medications   piperacillin-tazobactam (ZOSYN) 3.375 g vial to attach to  mL bag (not administered)   0.9% sodium chloride BOLUS (0 mLs Intravenous Stopped 1/31/19 1544)       Drips infusing?:  Yes    For the majority of the shift this patient was Green.   Interventions performed were .    Severe Sepsis OR Septic Shock Diagnosis Present: No    To be done/followed up on inpatient unit:  IV medication    ED NURSE PHONE NUMBER: [phone]

## 2019-01-31 NOTE — ED NOTES
Bed: ED29  Expected date:   Expected time:   Means of arrival:   Comments:  jamie - 58M fever eta 1250

## 2019-02-01 VITALS
HEIGHT: 69 IN | TEMPERATURE: 97 F | SYSTOLIC BLOOD PRESSURE: 129 MMHG | OXYGEN SATURATION: 95 % | HEART RATE: 72 BPM | RESPIRATION RATE: 16 BRPM | WEIGHT: 157.7 LBS | BODY MASS INDEX: 23.36 KG/M2 | DIASTOLIC BLOOD PRESSURE: 64 MMHG

## 2019-02-01 LAB
ANION GAP SERPL CALCULATED.3IONS-SCNC: 6 MMOL/L (ref 3–14)
BASOPHILS # BLD AUTO: 0.1 10E9/L (ref 0–0.2)
BASOPHILS NFR BLD AUTO: 0.9 %
BUN SERPL-MCNC: 11 MG/DL (ref 7–30)
CALCIUM SERPL-MCNC: 8.1 MG/DL (ref 8.5–10.1)
CHLORIDE SERPL-SCNC: 102 MMOL/L (ref 94–109)
CO2 SERPL-SCNC: 26 MMOL/L (ref 20–32)
CREAT SERPL-MCNC: 0.78 MG/DL (ref 0.66–1.25)
DIFFERENTIAL METHOD BLD: ABNORMAL
EOSINOPHIL # BLD AUTO: 0.7 10E9/L (ref 0–0.7)
EOSINOPHIL NFR BLD AUTO: 10.6 %
ERYTHROCYTE [DISTWIDTH] IN BLOOD BY AUTOMATED COUNT: 13.6 % (ref 10–15)
GFR SERPL CREATININE-BSD FRML MDRD: >90 ML/MIN/{1.73_M2}
GLUCOSE SERPL-MCNC: 90 MG/DL (ref 70–99)
HCT VFR BLD AUTO: 33.3 % (ref 40–53)
HGB BLD-MCNC: 11 G/DL (ref 13.3–17.7)
IMM GRANULOCYTES # BLD: 0 10E9/L (ref 0–0.4)
IMM GRANULOCYTES NFR BLD: 0.3 %
LYMPHOCYTES # BLD AUTO: 2 10E9/L (ref 0.8–5.3)
LYMPHOCYTES NFR BLD AUTO: 31.8 %
MAGNESIUM SERPL-MCNC: 2 MG/DL (ref 1.6–2.3)
MCH RBC QN AUTO: 27.7 PG (ref 26.5–33)
MCHC RBC AUTO-ENTMCNC: 33 G/DL (ref 31.5–36.5)
MCV RBC AUTO: 84 FL (ref 78–100)
MONOCYTES # BLD AUTO: 0.9 10E9/L (ref 0–1.3)
MONOCYTES NFR BLD AUTO: 13.4 %
NEUTROPHILS # BLD AUTO: 2.7 10E9/L (ref 1.6–8.3)
NEUTROPHILS NFR BLD AUTO: 43 %
NRBC # BLD AUTO: 0 10*3/UL
NRBC BLD AUTO-RTO: 0 /100
PHOSPHATE SERPL-MCNC: 3.6 MG/DL (ref 2.5–4.5)
PLATELET # BLD AUTO: 189 10E9/L (ref 150–450)
POTASSIUM SERPL-SCNC: 3.8 MMOL/L (ref 3.4–5.3)
RBC # BLD AUTO: 3.97 10E12/L (ref 4.4–5.9)
SODIUM SERPL-SCNC: 134 MMOL/L (ref 133–144)
WBC # BLD AUTO: 6.3 10E9/L (ref 4–11)

## 2019-02-01 PROCEDURE — 83735 ASSAY OF MAGNESIUM: CPT | Performed by: PHYSICIAN ASSISTANT

## 2019-02-01 PROCEDURE — 25000132 ZZH RX MED GY IP 250 OP 250 PS 637: Mod: GY | Performed by: PHYSICIAN ASSISTANT

## 2019-02-01 PROCEDURE — 84100 ASSAY OF PHOSPHORUS: CPT | Performed by: PHYSICIAN ASSISTANT

## 2019-02-01 PROCEDURE — 25000125 ZZHC RX 250: Performed by: PHYSICIAN ASSISTANT

## 2019-02-01 PROCEDURE — 85025 COMPLETE CBC W/AUTO DIFF WBC: CPT | Performed by: PHYSICIAN ASSISTANT

## 2019-02-01 PROCEDURE — 96376 TX/PRO/DX INJ SAME DRUG ADON: CPT

## 2019-02-01 PROCEDURE — 99217 ZZC OBSERVATION CARE DISCHARGE: CPT | Performed by: PHYSICIAN ASSISTANT

## 2019-02-01 PROCEDURE — G0378 HOSPITAL OBSERVATION PER HR: HCPCS

## 2019-02-01 PROCEDURE — A9270 NON-COVERED ITEM OR SERVICE: HCPCS | Mod: GY | Performed by: PHYSICIAN ASSISTANT

## 2019-02-01 PROCEDURE — 80048 BASIC METABOLIC PNL TOTAL CA: CPT | Performed by: PHYSICIAN ASSISTANT

## 2019-02-01 PROCEDURE — 36415 COLL VENOUS BLD VENIPUNCTURE: CPT | Performed by: PHYSICIAN ASSISTANT

## 2019-02-01 PROCEDURE — 25000128 H RX IP 250 OP 636: Performed by: PHYSICIAN ASSISTANT

## 2019-02-01 PROCEDURE — 27210429 ZZH NUTRITION PRODUCT INTERMEDIATE LITER

## 2019-02-01 RX ADMIN — POTASSIUM & SODIUM PHOSPHATES POWDER PACK 280-160-250 MG 1 PACKET: 280-160-250 PACK at 14:24

## 2019-02-01 RX ADMIN — CALCIUM CARBONATE 1250 MG: 1250 SUSPENSION ORAL at 08:09

## 2019-02-01 RX ADMIN — GLYCOPYRROLATE 2 MG: 1 TABLET ORAL at 14:24

## 2019-02-01 RX ADMIN — LEVOTHYROXINE SODIUM 137 MCG: 112 TABLET ORAL at 08:10

## 2019-02-01 RX ADMIN — Medication 250 MG: at 08:11

## 2019-02-01 RX ADMIN — CARBAMAZEPINE 150 MG: 100 SUSPENSION ORAL at 06:13

## 2019-02-01 RX ADMIN — HYDROCORTISONE 20 MG: 10 TABLET ORAL at 08:10

## 2019-02-01 RX ADMIN — CALCIUM CARBONATE 1250 MG: 1250 SUSPENSION ORAL at 12:19

## 2019-02-01 RX ADMIN — SODIUM BICARBONATE 20 MG: 84 INJECTION, SOLUTION INTRAVENOUS at 08:09

## 2019-02-01 RX ADMIN — PIPERACILLIN AND TAZOBACTAM 4.5 G: 4; .5 INJECTION, POWDER, FOR SOLUTION INTRAVENOUS at 05:34

## 2019-02-01 RX ADMIN — BRIVARACETAM 100 MG: 10 SOLUTION ORAL at 08:09

## 2019-02-01 RX ADMIN — POTASSIUM & SODIUM PHOSPHATES POWDER PACK 280-160-250 MG 1 PACKET: 280-160-250 PACK at 08:10

## 2019-02-01 RX ADMIN — CARBAMAZEPINE 150 MG: 100 SUSPENSION ORAL at 00:47

## 2019-02-01 RX ADMIN — PIPERACILLIN AND TAZOBACTAM 4.5 G: 4; .5 INJECTION, POWDER, FOR SOLUTION INTRAVENOUS at 11:43

## 2019-02-01 RX ADMIN — CARBAMAZEPINE 150 MG: 100 SUSPENSION ORAL at 12:19

## 2019-02-01 RX ADMIN — GLYCOPYRROLATE 2 MG: 1 TABLET ORAL at 08:10

## 2019-02-01 ASSESSMENT — MIFFLIN-ST. JEOR: SCORE: 1535.7

## 2019-02-01 NOTE — PLAN OF CARE
PRIOR TO DISCHARGE    Comments: List all goals to be met before discharge home:   - Dyspnea improved and oxygen saturations greater than 88% on room air or prior home oxygen levels : Met, pt on RA, saturation 95-98%  - Tolerates oral antibiotics or has plans for home infusion set up: Not met  - Vital signs normal or at patient baseline : Met  - Infection is improving : partially met  - Return to baseline functional status : partially met  - Safe disposition plan has been identified : Not met    Pt alert to self, nonverbal. Turn/Repo q 2 hrs, total cares. VSS. LS diminished, has cough but swallows secretions. NPO except for medication.  Pt has a Peg-tube clamped. Incontinent for urine and stools. IV-SL with intermittent IV antibiotics. Contact precaution for MRSA/VRE. Negative for influenza A/B. Continue to monitor.

## 2019-02-01 NOTE — PLAN OF CARE
Spoke with Savannah, patient's Mother on phone, reviewed discharge medications and instructions.  Patient alert, all personal belongings gathered and sent with patient via transport wheelchair to home.

## 2019-02-01 NOTE — CONSULTS
Care Transition Initial Assessment -      Met with: Family    Active Problems:    Aspiration pneumonia (H)       DATA  Lives With: parent(s)      Quality of Family Relationships: supportive, involved  Description of Support System: Involved, Supportive  Who is your support system?: Parent(s)  Identified issues/concerns regarding health management: discharge planning: home.      Quality of Family Relationships: supportive, involved    ASSESSMENT  Cognitive Status:  Unable to assess, spoke with pt mother & guardian on phone     Concerns to be addressed: Per  consult for discharge planning. Pt admitted 1/31/19 with aspiration and pneumonia. Discharge today to home. Pt lives at home with mother who provided 24/7 care. Pt has 12 hours of PCA services daily, his mother is one of his PCA's and there is another PCA as well. Pt has 12 hours of nursing daily - services though [facility]. Pt has an EZ stand that is used at home. Pt has wheelchair ramp from garage into kitchen.  set up transport for 1430 via [facility] at request of mother. Pt on MA so cost will be covered.      PLAN  Financial costs for the patient includes: N/A  Patient given options and choices for discharge: Home with services   Patient/family is agreeable to the plan?  Yes  Patient Goals and Preferences: Home with services   Patient anticipates discharging to:  Home with services     BAYRON Clinton

## 2019-02-01 NOTE — PLAN OF CARE
PRIOR TO DISCHARGE     Comments: List all goals to be met before discharge home:   - Dyspnea improved and oxygen saturations greater than 88% on room air or prior home oxygen levels : Met, pt on RA, saturation 95-98%  - Tolerates oral antibiotics or has plans for home infusion set up: Not met  - Vital signs normal or at patient baseline : Met  - Infection is improving : partially met  - Return to baseline functional status : partially met  - Safe disposition plan has been identified : Not met  - Nurse to notify provider when observation goals have been met and patient is ready for discharge.     Pt alert to self, nonverbal. Contact precaution for MRSA/VRE. Negative for influenza A/B/. Pt has a Peg-tube, NPO except for medication. Respiratory saw pt, educating how to use acapella, without success. Acapella and IS at bedside. Pt is total care, incontinent for urine and stools. IV site patent, for IV antibiotics. Will continue to monitor.

## 2019-02-01 NOTE — PLAN OF CARE
PRIOR TO DISCHARGE     Comments: List all goals to be met before discharge home:   - Dyspnea improved and oxygen saturations greater than 88% on room air or prior home oxygen levels : Met, pt on RA, saturation 95-98%  - Tolerates oral antibiotics or has plans for home infusion set up: Not met  - Vital signs normal or at patient baseline : Met  - Infection is improving : partially met  - Return to baseline functional status : partially met  - Safe disposition plan has been identified : Not met

## 2019-02-01 NOTE — CONSULTS
Clinical Nutrition Services    Received consult for Registered Dietitian to Assess and Order TF per MNT recommendations     Pt well known to nutrition services, has a chronic G/J-Tube for nutrition. Most recently seen here by the RD on 1/15/2019. Pt is currently observation status.     Home TF regimen is as follows =  Jevity 1.5 (5 to 5.5 cans daily) x 12 hours during the day to provide 1917-6634 kcal, 77-83g protein, ~260g CHO, ~27g fiber and ~900mL free water     Assessed Nutrition Needs  Dosing Weight: 71.5 kg - most recent   Estimated Energy Needs: 5735-2977 kcals (25-30 Kcal/Kg)  Justification: maintenance  Estimated Protein Needs:  grams protein (1.2-1.5 g pro/Kg)  Justification: preservation of lean body mass  Estimated Fluid Needs: 1620-7658+   mL (1 mL/Kcal)  Justification: maintenance    Patient does not meet two of the criteria necessary for diagnosing malnutrition    Implementation:  Isosource 1.5 at 100 mL/hr x 12 hours (7 am - 7 pm) to provide 1800 kcal (25 kcal/kg), 82g protein (1.2 kcal/kg), 211g CHO, 18g fiber and 912mL water.   Feeding Tube Flush: 120 mL 4 times daily      Yanely Jefferson RD, LD  Clinical Dietitian
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
## 2019-02-01 NOTE — PLAN OF CARE
Patient alert, nonverbal, unable to assess orientation, but he is able to nod yes/no, and does thumbs up gesture.  VSS on room air, he has infrequent congested cough.  Lungs diminished.  Incontinent of bowel and bladder.  He has some redness on buttocks, barrier cream applied. PEG tube jejunal side was clogged, IR came up and opened it.  Started TF at 50 ml/hr.  Denies pain.  Spoke with Mother Savannah on phone.  Patient will discharge at 1430.

## 2019-02-01 NOTE — PROGRESS NOTES
- Dyspnea improved and oxygen saturations greater than 88% on room air or prior home oxygen levels- met.  - Tolerates oral antibiotics or has plans for home infusion set up-met.  - Vital signs normal or at patient baseline - met.  - Infection is improving -met.  - Return to baseline functional status -met.  - Safe disposition plan has been identified -met.  - Nurse to notify provider when observation goals have been met and patient is ready for discharge.
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
## 2019-02-01 NOTE — PROGRESS NOTES
- Dyspnea improved and oxygen saturations greater than 88% on room air or prior home oxygen levels- met.  - Tolerates oral antibiotics or has plans for home infusion set up-not met.  - Vital signs normal or at patient baseline - met.  - Infection is improving -met.  - Return to baseline functional status -met.  - Safe disposition plan has been identified -not met.  - Nurse to notify provider when observation goals have been met and patient is ready for discharge.

## 2019-02-04 ENCOUNTER — HOSPITAL ENCOUNTER (OUTPATIENT)
Facility: CLINIC | Age: 59
Discharge: HOME | End: 2019-02-04
Attending: RADIOLOGY | Admitting: RADIOLOGY
Payer: MEDICARE

## 2019-02-04 ENCOUNTER — HOSPITAL ENCOUNTER (OUTPATIENT)
Facility: CLINIC | Age: 59
End: 2019-02-04
Admitting: RADIOLOGY

## 2019-02-04 ENCOUNTER — APPOINTMENT (OUTPATIENT)
Dept: INTERVENTIONAL RADIOLOGY/VASCULAR | Facility: CLINIC | Age: 59
End: 2019-02-04
Payer: MEDICARE

## 2019-02-04 VITALS
DIASTOLIC BLOOD PRESSURE: 71 MMHG | RESPIRATION RATE: 18 BRPM | SYSTOLIC BLOOD PRESSURE: 120 MMHG | OXYGEN SATURATION: 94 %

## 2019-02-04 DIAGNOSIS — Z51.89 TREATMENT: ICD-10-CM

## 2019-02-04 PROCEDURE — 40000854 ZZH STATISTIC SIMPLE TUBE INSERTION/CHARGE, PORT, CATH, FISTULOGRAM

## 2019-02-04 PROCEDURE — C1769 GUIDE WIRE: HCPCS

## 2019-02-04 PROCEDURE — 49452 REPLACE G-J TUBE PERC: CPT

## 2019-02-04 PROCEDURE — 27210815 ZZ H TUBE GASTRO CR13

## 2019-02-04 PROCEDURE — 27210905 ZZH KIT CR7

## 2019-02-04 RX ORDER — DEXTROSE MONOHYDRATE 25 G/50ML
25-50 INJECTION, SOLUTION INTRAVENOUS
Status: DISCONTINUED | OUTPATIENT
Start: 2019-02-04 | End: 2019-02-04 | Stop reason: HOSPADM

## 2019-02-04 RX ORDER — NICOTINE POLACRILEX 4 MG
15-30 LOZENGE BUCCAL
Status: DISCONTINUED | OUTPATIENT
Start: 2019-02-04 | End: 2019-02-04 | Stop reason: HOSPADM

## 2019-02-05 NOTE — PROGRESS NOTES
1945 Pt to CS. G tube site WDL. Pt dressed with assist X2 and DC'd with mother and RN and all belongings.

## 2019-02-05 NOTE — PROCEDURES
RADIOLOGY POST PROCEDURE NOTE    Patient name: Keyon Farias  MRN: [id number]  : 1960    Pre-procedure diagnosis: broken gastrojejunostomy tube  Post-procedure diagnosis: Same    Procedure Date/Time: 2019  7:25 PM  Procedure: gastrojejunostomy tube exchange.  Estimated blood loss: None  Specimen(s) collected with description: none    The patient tolerated the procedure well with no immediate complications.  Significant findings:none    See imaging dictation for procedural details.    Provider name: Megan Stout  Assistant(s):None

## 2019-02-20 ENCOUNTER — APPOINTMENT (OUTPATIENT)
Dept: GENERAL RADIOLOGY | Facility: CLINIC | Age: 59
DRG: 871 | End: 2019-02-20
Attending: EMERGENCY MEDICINE
Payer: MEDICARE

## 2019-02-20 ENCOUNTER — HOSPITAL ENCOUNTER (INPATIENT)
Facility: CLINIC | Age: 59
LOS: 2 days | Discharge: HOME HEALTH | DRG: 871 | End: 2019-02-22
Attending: EMERGENCY MEDICINE | Admitting: INTERNAL MEDICINE
Payer: MEDICARE

## 2019-02-20 ENCOUNTER — APPOINTMENT (OUTPATIENT)
Dept: INTERVENTIONAL RADIOLOGY/VASCULAR | Facility: CLINIC | Age: 59
DRG: 871 | End: 2019-02-20
Attending: INTERNAL MEDICINE
Payer: MEDICARE

## 2019-02-20 DIAGNOSIS — A41.9 SEPSIS, DUE TO UNSPECIFIED ORGANISM: ICD-10-CM

## 2019-02-20 DIAGNOSIS — J18.9 HEALTHCARE-ASSOCIATED PNEUMONIA: ICD-10-CM

## 2019-02-20 DIAGNOSIS — S06.9X0D TRAUMATIC BRAIN INJURY, WITHOUT LOSS OF CONSCIOUSNESS, SUBSEQUENT ENCOUNTER: Primary | ICD-10-CM

## 2019-02-20 DIAGNOSIS — J96.01 ACUTE RESPIRATORY FAILURE WITH HYPOXIA (H): ICD-10-CM

## 2019-02-20 DIAGNOSIS — J18.9 PNEUMONIA OF BOTH LOWER LOBES DUE TO INFECTIOUS ORGANISM: ICD-10-CM

## 2019-02-20 LAB
ALBUMIN SERPL-MCNC: 3 G/DL (ref 3.4–5)
ALBUMIN UR-MCNC: 10 MG/DL
ALP SERPL-CCNC: 88 U/L (ref 40–150)
ALT SERPL W P-5'-P-CCNC: 27 U/L (ref 0–70)
ANION GAP SERPL CALCULATED.3IONS-SCNC: 10 MMOL/L (ref 3–14)
APPEARANCE UR: CLEAR
AST SERPL W P-5'-P-CCNC: 19 U/L (ref 0–45)
BACTERIA #/AREA URNS HPF: ABNORMAL /HPF
BASOPHILS # BLD AUTO: 0.1 10E9/L (ref 0–0.2)
BASOPHILS NFR BLD AUTO: 0.3 %
BILIRUB SERPL-MCNC: 0.3 MG/DL (ref 0.2–1.3)
BILIRUB UR QL STRIP: NEGATIVE
BUN SERPL-MCNC: 14 MG/DL (ref 7–30)
CALCIUM SERPL-MCNC: 8.4 MG/DL (ref 8.5–10.1)
CHLORIDE SERPL-SCNC: 100 MMOL/L (ref 94–109)
CO2 SERPL-SCNC: 25 MMOL/L (ref 20–32)
COLOR UR AUTO: YELLOW
CREAT SERPL-MCNC: 0.77 MG/DL (ref 0.66–1.25)
DIFFERENTIAL METHOD BLD: ABNORMAL
EOSINOPHIL # BLD AUTO: 0.5 10E9/L (ref 0–0.7)
EOSINOPHIL NFR BLD AUTO: 3 %
ERYTHROCYTE [DISTWIDTH] IN BLOOD BY AUTOMATED COUNT: 14.2 % (ref 10–15)
FLUAV+FLUBV AG SPEC QL: NEGATIVE
FLUAV+FLUBV AG SPEC QL: NEGATIVE
GFR SERPL CREATININE-BSD FRML MDRD: >90 ML/MIN/{1.73_M2}
GLUCOSE BLDC GLUCOMTR-MCNC: 304 MG/DL (ref 70–99)
GLUCOSE SERPL-MCNC: 95 MG/DL (ref 70–99)
GLUCOSE UR STRIP-MCNC: 30 MG/DL
HCT VFR BLD AUTO: 36.7 % (ref 40–53)
HGB BLD-MCNC: 12.4 G/DL (ref 13.3–17.7)
HGB UR QL STRIP: NEGATIVE
IMM GRANULOCYTES # BLD: 0.1 10E9/L (ref 0–0.4)
IMM GRANULOCYTES NFR BLD: 0.3 %
KETONES UR STRIP-MCNC: NEGATIVE MG/DL
LACTATE BLD-SCNC: 1.7 MMOL/L (ref 0.7–2)
LEUKOCYTE ESTERASE UR QL STRIP: NEGATIVE
LYMPHOCYTES # BLD AUTO: 2.7 10E9/L (ref 0.8–5.3)
LYMPHOCYTES NFR BLD AUTO: 16.2 %
MCH RBC QN AUTO: 27.9 PG (ref 26.5–33)
MCHC RBC AUTO-ENTMCNC: 33.8 G/DL (ref 31.5–36.5)
MCV RBC AUTO: 83 FL (ref 78–100)
MONOCYTES # BLD AUTO: 1.4 10E9/L (ref 0–1.3)
MONOCYTES NFR BLD AUTO: 8.5 %
NEUTROPHILS # BLD AUTO: 11.8 10E9/L (ref 1.6–8.3)
NEUTROPHILS NFR BLD AUTO: 71.7 %
NITRATE UR QL: NEGATIVE
NRBC # BLD AUTO: 0 10*3/UL
NRBC BLD AUTO-RTO: 0 /100
PH UR STRIP: 8.5 PH (ref 5–7)
PLATELET # BLD AUTO: 173 10E9/L (ref 150–450)
POTASSIUM SERPL-SCNC: 4.1 MMOL/L (ref 3.4–5.3)
PROT SERPL-MCNC: 7.6 G/DL (ref 6.8–8.8)
RBC # BLD AUTO: 4.45 10E12/L (ref 4.4–5.9)
RBC #/AREA URNS AUTO: <1 /HPF (ref 0–2)
SODIUM SERPL-SCNC: 135 MMOL/L (ref 133–144)
SOURCE: ABNORMAL
SP GR UR STRIP: 1.01 (ref 1–1.03)
SPECIMEN SOURCE: NORMAL
SQUAMOUS #/AREA URNS AUTO: <1 /HPF (ref 0–1)
UROBILINOGEN UR STRIP-MCNC: NORMAL MG/DL (ref 0–2)
WBC # BLD AUTO: 16.5 10E9/L (ref 4–11)
WBC #/AREA URNS AUTO: 1 /HPF (ref 0–5)

## 2019-02-20 PROCEDURE — 87804 INFLUENZA ASSAY W/OPTIC: CPT | Performed by: EMERGENCY MEDICINE

## 2019-02-20 PROCEDURE — 96375 TX/PRO/DX INJ NEW DRUG ADDON: CPT

## 2019-02-20 PROCEDURE — 25800030 ZZH RX IP 258 OP 636: Performed by: INTERNAL MEDICINE

## 2019-02-20 PROCEDURE — 00000146 ZZHCL STATISTIC GLUCOSE BY METER IP

## 2019-02-20 PROCEDURE — 40000333 IR FOLLOW UP VISIT INPATIENT: Mod: TC

## 2019-02-20 PROCEDURE — 25000125 ZZHC RX 250: Performed by: INTERNAL MEDICINE

## 2019-02-20 PROCEDURE — 87040 BLOOD CULTURE FOR BACTERIA: CPT | Performed by: EMERGENCY MEDICINE

## 2019-02-20 PROCEDURE — 25000128 H RX IP 250 OP 636: Performed by: INTERNAL MEDICINE

## 2019-02-20 PROCEDURE — 99223 1ST HOSP IP/OBS HIGH 75: CPT | Mod: AI | Performed by: INTERNAL MEDICINE

## 2019-02-20 PROCEDURE — 12000000 ZZH R&B MED SURG/OB

## 2019-02-20 PROCEDURE — 81001 URINALYSIS AUTO W/SCOPE: CPT | Performed by: EMERGENCY MEDICINE

## 2019-02-20 PROCEDURE — 25800030 ZZH RX IP 258 OP 636: Performed by: EMERGENCY MEDICINE

## 2019-02-20 PROCEDURE — 85025 COMPLETE CBC W/AUTO DIFF WBC: CPT | Performed by: EMERGENCY MEDICINE

## 2019-02-20 PROCEDURE — 25000132 ZZH RX MED GY IP 250 OP 250 PS 637: Mod: GY | Performed by: INTERNAL MEDICINE

## 2019-02-20 PROCEDURE — 80053 COMPREHEN METABOLIC PANEL: CPT | Performed by: EMERGENCY MEDICINE

## 2019-02-20 PROCEDURE — A9270 NON-COVERED ITEM OR SERVICE: HCPCS | Mod: GY | Performed by: EMERGENCY MEDICINE

## 2019-02-20 PROCEDURE — 87086 URINE CULTURE/COLONY COUNT: CPT | Performed by: EMERGENCY MEDICINE

## 2019-02-20 PROCEDURE — A9270 NON-COVERED ITEM OR SERVICE: HCPCS | Mod: GY | Performed by: INTERNAL MEDICINE

## 2019-02-20 PROCEDURE — 83605 ASSAY OF LACTIC ACID: CPT | Performed by: EMERGENCY MEDICINE

## 2019-02-20 PROCEDURE — 27210429 ZZH NUTRITION PRODUCT INTERMEDIATE LITER

## 2019-02-20 PROCEDURE — C1769 GUIDE WIRE: HCPCS

## 2019-02-20 PROCEDURE — 99285 EMERGENCY DEPT VISIT HI MDM: CPT | Mod: 25

## 2019-02-20 PROCEDURE — 96365 THER/PROPH/DIAG IV INF INIT: CPT

## 2019-02-20 PROCEDURE — 96361 HYDRATE IV INFUSION ADD-ON: CPT

## 2019-02-20 PROCEDURE — 25000132 ZZH RX MED GY IP 250 OP 250 PS 637: Mod: GY | Performed by: EMERGENCY MEDICINE

## 2019-02-20 PROCEDURE — 87186 SC STD MICRODIL/AGAR DIL: CPT | Performed by: EMERGENCY MEDICINE

## 2019-02-20 PROCEDURE — 87077 CULTURE AEROBIC IDENTIFY: CPT | Performed by: EMERGENCY MEDICINE

## 2019-02-20 PROCEDURE — 25000128 H RX IP 250 OP 636: Performed by: EMERGENCY MEDICINE

## 2019-02-20 PROCEDURE — 87800 DETECT AGNT MULT DNA DIREC: CPT | Performed by: EMERGENCY MEDICINE

## 2019-02-20 PROCEDURE — 71046 X-RAY EXAM CHEST 2 VIEWS: CPT

## 2019-02-20 RX ORDER — ONDANSETRON 4 MG/1
4 TABLET, ORALLY DISINTEGRATING ORAL EVERY 6 HOURS PRN
Status: DISCONTINUED | OUTPATIENT
Start: 2019-02-20 | End: 2019-02-22 | Stop reason: HOSPADM

## 2019-02-20 RX ORDER — SCOLOPAMINE TRANSDERMAL SYSTEM 1 MG/1
1 PATCH, EXTENDED RELEASE TRANSDERMAL
COMMUNITY
End: 2019-03-09

## 2019-02-20 RX ORDER — CARBAMAZEPINE 100 MG/5ML
150 SUSPENSION ORAL EVERY 6 HOURS SCHEDULED
Status: DISCONTINUED | OUTPATIENT
Start: 2019-02-20 | End: 2019-02-22 | Stop reason: HOSPADM

## 2019-02-20 RX ORDER — SCOLOPAMINE TRANSDERMAL SYSTEM 1 MG/1
1 PATCH, EXTENDED RELEASE TRANSDERMAL
Status: DISCONTINUED | OUTPATIENT
Start: 2019-02-20 | End: 2019-02-22 | Stop reason: HOSPADM

## 2019-02-20 RX ORDER — SACCHAROMYCES BOULARDII 250 MG
250 CAPSULE ORAL DAILY
Status: DISCONTINUED | OUTPATIENT
Start: 2019-02-20 | End: 2019-02-22 | Stop reason: HOSPADM

## 2019-02-20 RX ORDER — LEVOTHYROXINE SODIUM 137 UG/1
137 TABLET ORAL DAILY
Status: DISCONTINUED | OUTPATIENT
Start: 2019-02-20 | End: 2019-02-22 | Stop reason: HOSPADM

## 2019-02-20 RX ORDER — SODIUM CHLORIDE 9 MG/ML
INJECTION, SOLUTION INTRAVENOUS CONTINUOUS
Status: DISCONTINUED | OUTPATIENT
Start: 2019-02-20 | End: 2019-02-21

## 2019-02-20 RX ORDER — ONDANSETRON 2 MG/ML
4 INJECTION INTRAMUSCULAR; INTRAVENOUS EVERY 6 HOURS PRN
Status: DISCONTINUED | OUTPATIENT
Start: 2019-02-20 | End: 2019-02-22 | Stop reason: HOSPADM

## 2019-02-20 RX ORDER — ALBUTEROL SULFATE 0.83 MG/ML
2.5 SOLUTION RESPIRATORY (INHALATION) EVERY 4 HOURS PRN
Status: DISCONTINUED | OUTPATIENT
Start: 2019-02-20 | End: 2019-02-22 | Stop reason: HOSPADM

## 2019-02-20 RX ORDER — NALOXONE HYDROCHLORIDE 0.4 MG/ML
.1-.4 INJECTION, SOLUTION INTRAMUSCULAR; INTRAVENOUS; SUBCUTANEOUS
Status: DISCONTINUED | OUTPATIENT
Start: 2019-02-20 | End: 2019-02-22 | Stop reason: HOSPADM

## 2019-02-20 RX ORDER — ACETAMINOPHEN 650 MG/1
650 SUPPOSITORY RECTAL EVERY 4 HOURS PRN
Status: DISCONTINUED | OUTPATIENT
Start: 2019-02-20 | End: 2019-02-22 | Stop reason: HOSPADM

## 2019-02-20 RX ORDER — SCOLOPAMINE TRANSDERMAL SYSTEM 1 MG/1
1 PATCH, EXTENDED RELEASE TRANSDERMAL
Status: DISCONTINUED | OUTPATIENT
Start: 2019-02-22 | End: 2019-02-20

## 2019-02-20 RX ORDER — PIPERACILLIN SODIUM, TAZOBACTAM SODIUM 3; .375 G/15ML; G/15ML
3.38 INJECTION, POWDER, LYOPHILIZED, FOR SOLUTION INTRAVENOUS EVERY 6 HOURS
Status: DISCONTINUED | OUTPATIENT
Start: 2019-02-20 | End: 2019-02-21

## 2019-02-20 RX ORDER — AMOXICILLIN 250 MG
2 CAPSULE ORAL 2 TIMES DAILY PRN
Status: DISCONTINUED | OUTPATIENT
Start: 2019-02-20 | End: 2019-02-22 | Stop reason: HOSPADM

## 2019-02-20 RX ORDER — AMOXICILLIN 250 MG
1 CAPSULE ORAL 2 TIMES DAILY PRN
Status: DISCONTINUED | OUTPATIENT
Start: 2019-02-20 | End: 2019-02-22 | Stop reason: HOSPADM

## 2019-02-20 RX ORDER — BISACODYL 10 MG
10 SUPPOSITORY, RECTAL RECTAL DAILY PRN
Status: DISCONTINUED | OUTPATIENT
Start: 2019-02-20 | End: 2019-02-22 | Stop reason: HOSPADM

## 2019-02-20 RX ORDER — ACETAMINOPHEN 500 MG
1000 TABLET ORAL EVERY 6 HOURS PRN
Status: DISCONTINUED | OUTPATIENT
Start: 2019-02-20 | End: 2019-02-20

## 2019-02-20 RX ORDER — PIPERACILLIN SODIUM, TAZOBACTAM SODIUM 3; .375 G/15ML; G/15ML
3.38 INJECTION, POWDER, LYOPHILIZED, FOR SOLUTION INTRAVENOUS ONCE
Status: COMPLETED | OUTPATIENT
Start: 2019-02-20 | End: 2019-02-20

## 2019-02-20 RX ADMIN — HYDROCORTISONE SODIUM SUCCINATE 100 MG: 100 INJECTION, POWDER, FOR SOLUTION INTRAMUSCULAR; INTRAVENOUS at 10:43

## 2019-02-20 RX ADMIN — SCOPALAMINE 1 PATCH: 1 PATCH, EXTENDED RELEASE TRANSDERMAL at 14:49

## 2019-02-20 RX ADMIN — PIPERACILLIN SODIUM,TAZOBACTAM SODIUM 3.38 G: 3; .375 INJECTION, POWDER, FOR SOLUTION INTRAVENOUS at 04:57

## 2019-02-20 RX ADMIN — LEVOTHYROXINE SODIUM 137 MCG: 137 TABLET ORAL at 10:43

## 2019-02-20 RX ADMIN — SODIUM CHLORIDE: 9 INJECTION, SOLUTION INTRAVENOUS at 22:49

## 2019-02-20 RX ADMIN — SODIUM BICARBONATE 20 MG: 84 INJECTION, SOLUTION INTRAVENOUS at 20:35

## 2019-02-20 RX ADMIN — CARBAMAZEPINE 150 MG: 100 SUSPENSION ORAL at 13:50

## 2019-02-20 RX ADMIN — Medication 250 MG: at 10:43

## 2019-02-20 RX ADMIN — SODIUM CHLORIDE 1000 ML: 9 INJECTION, SOLUTION INTRAVENOUS at 02:28

## 2019-02-20 RX ADMIN — SODIUM CHLORIDE: 9 INJECTION, SOLUTION INTRAVENOUS at 10:58

## 2019-02-20 RX ADMIN — HYDROCORTISONE SODIUM SUCCINATE 50 MG: 100 INJECTION, POWDER, FOR SOLUTION INTRAMUSCULAR; INTRAVENOUS at 16:35

## 2019-02-20 RX ADMIN — PIPERACILLIN SODIUM,TAZOBACTAM SODIUM 3.38 G: 3; .375 INJECTION, POWDER, FOR SOLUTION INTRAVENOUS at 22:47

## 2019-02-20 RX ADMIN — BRIVARACETAM 100 MG: 10 SOLUTION ORAL at 20:36

## 2019-02-20 RX ADMIN — PIPERACILLIN SODIUM,TAZOBACTAM SODIUM 3.38 G: 3; .375 INJECTION, POWDER, FOR SOLUTION INTRAVENOUS at 16:38

## 2019-02-20 RX ADMIN — SODIUM BICARBONATE 20 MG: 84 INJECTION, SOLUTION INTRAVENOUS at 10:43

## 2019-02-20 RX ADMIN — VANCOMYCIN HYDROCHLORIDE 1500 MG: 5 INJECTION, POWDER, LYOPHILIZED, FOR SOLUTION INTRAVENOUS at 05:23

## 2019-02-20 RX ADMIN — ACETAMINOPHEN 1000 MG: 160 SUSPENSION ORAL at 04:29

## 2019-02-20 RX ADMIN — CARBAMAZEPINE 150 MG: 100 SUSPENSION ORAL at 20:35

## 2019-02-20 RX ADMIN — PIPERACILLIN SODIUM,TAZOBACTAM SODIUM 3.38 G: 3; .375 INJECTION, POWDER, FOR SOLUTION INTRAVENOUS at 10:43

## 2019-02-20 RX ADMIN — BRIVARACETAM 100 MG: 10 SOLUTION ORAL at 10:43

## 2019-02-20 RX ADMIN — CARBAMAZEPINE 150 MG: 100 SUSPENSION ORAL at 10:43

## 2019-02-20 ASSESSMENT — ACTIVITIES OF DAILY LIVING (ADL)
ADLS_ACUITY_SCORE: 46
ADLS_ACUITY_SCORE: 46
ADLS_ACUITY_SCORE: 42
ADLS_ACUITY_SCORE: 42

## 2019-02-20 ASSESSMENT — ENCOUNTER SYMPTOMS
VOMITING: 0
FEVER: 1
COUGH: 1
DIARRHEA: 0

## 2019-02-20 ASSESSMENT — MIFFLIN-ST. JEOR: SCORE: 1593.76
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
## 2019-02-20 NOTE — PHARMACY-VANCOMYCIN DOSING SERVICE
Admission medication history interview status for the 2/20/2019  admission is complete. See EPIC admission navigator for prior to admission medications     Medication history source reliability:Good    Actions taken by pharmacist (provider contacted, etc):None     Additional medication history information not noted on PTA med list :None    Medication reconciliation/reorder completed by provider prior to medication history? Yes    Time spent in this activity: 20 min    Prior to Admission medications    Medication Sig Last Dose Taking? Auth Provider   acetaminophen (TYLENOL) 500 MG tablet 1,000 mg by Oral or FT or NG tube route every 6 hours as needed for mild pain Past Week at Unknown time Yes Unknown, Entered By History   albuterol (2.5 MG/3ML) 0.083% neb solution Take 1 vial by nebulization every 4 hours as needed for shortness of breath / dyspnea or wheezing Past Week at Unknown time Yes Unknown, Entered By History   bacitracin ointment Apply topically daily To PEG site.  2/19/2019 at am Yes Unknown, Entered By History   Brivaracetam (BRIVIACT) 10 MG/ML solution 100 mg by Oral or FT or NG tube route 2 times daily @0900 and 2100 2/19/2019 at 2100 Yes Reported, Patient   calcium carbonate 1250 (500 CA) MG/5ML SUSP suspension 5 mLs (1,250 mg) by Per J Tube route 3 times daily (with meals)  Patient taking differently: by Per J Tube route 3 times daily (with meals)  2/19/2019 at pm Yes Mariana Venegas MD   carBAMazepine (TEGRETOL) 100 MG/5ML suspension Take 150 mg by mouth every 6 hours At 06:00, 12:00, 18:00 and 24:00 for seizures 2/20/2019 at 0000 Yes Unknown, Entered By History   hydrocortisone (CORTEF) 5 MG tablet 10 mg by Oral or FT or NG tube route every evening  2/19/2019 at 1500 Yes Unknown, Entered By History   hydrocortisone (CORTEF) 5 MG tablet 20 mg by Oral or FT or NG tube route every morning  2/19/2019 at am Yes Unknown, Entered By History   levothyroxine (SYNTHROID/LEVOTHROID) 137 MCG tablet 137  mcg by Oral or FT or NG tube route daily 2/19/2019 at am Yes Unknown, Entered By History   melatonin (MELATONIN) 1 MG/ML LIQD liquid 6 mg by Per NG tube route At Bedtime  2/19/2019 at hs Yes Unknown, Entered By History   Multiple Vitamins-Minerals (CENTRUM SILVER) per tablet Take 1 tablet by mouth daily Crush and feed via j-tube 2/19/2019 at am Yes Unknown, Entered By History   pantoprazole (PROTONIX) 2 mg/mL SUSP suspension 20 mg by Per NG tube route 2 times daily  2/19/2019 at pm Yes Unknown, Entered By History   potassium & sodium phosphates (NEUTRA-PHOS) 280-160-250 MG Packet Take 1 packet by mouth 3 times daily 0900, 1500, 2100.  2/19/2019 at Unknown time Yes Unknown, Entered By History   saccharomyces boulardii (FLORASTOR) 250 MG capsule 250 mg by Oral or Feeding Tube route daily 2/19/2019 at Unknown time Yes Unknown, Entered By History   Vitamin D, Cholecalciferol, 1000 units TABS Take 2,000 Units by mouth every morning Crush and feed via j-tube 2/19/2019 at Unknown time Yes Unknown, Entered By History   Wheat Dextrin (BENEFIBER) POWD 2 teaspoonful by Per NG tube route daily  2/19/2019 at am Yes Unknown, Entered By History   testosterone cypionate (DEPOTESTOTERONE CYPIONATE) 200 MG/ML injection Inject 76 mg into the muscle See Admin Instructions Every 2 weeks on Fridays   76 mg or 0.38 mL unknown  Unknown, Entered By History

## 2019-02-20 NOTE — ED PROVIDER NOTES
History     Chief Complaint:  Fever    HPI   History limited secondary to the patient being nonverbal. History provided by EMS report and the patient s mother.     Keyon Farias is a nonverbal 58 year old male with a history of traumatic brain injury with right sided hemiparesis, seizure disorder, panhypopituitarism, and dysphagia resulting in G tube dependence who presents to the ED via EMS for evaluation of a fever. Per chart review, the patient has had multiple hospitalizations secondary to aspiration pneumonia, the most recent of which was from 1/14/19-1/17/19.     I spoke to his mother Svaannah by phone who provided history.  The patient currently lives at home with his mother, along with nursing staff and a personal care attendant. His mother states that the patient has recently been  gurgling  up thick phlegm in his throat and developed a cough that is worse at night. She called EMS secondary to the patient having a temp tonight of 98'.  EMS reportedly found the patient under multiple blankets at the scene. He arrives here febrile at 100.7 F. She denies any vomiting or diarrhea. Of note, the patient is full code status.     Allergies:  Dilantin  Valproic Acid     Medications:    Albuterol  Briviact  Calcium carbonate  Tegretol  Cortef  Synthroid/Levothroid  Melatonin  Protonix  Neutra-Phos  Florastor  Depotesterone Cypionate     Past Medical History:    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 traumatic brain injury  DVT  Gastro-oesophageal reflux disease  Panhypopituitarism  Seizures  Spastic hemiplegia affecting dominant side  Thyroid disease  Traumatic brain injury  Cerebral artery occlusion with cerebral infarction  Ventricular fibrillation  Ventricular tachyarrhythmia  PEG tube in place  Necrotizing pancreatitis  Septic shock  Cardiac arrest  Respiratory failure with hypoxia  Aspiration pneumonia  Encephalopathy  PEG tube malfunction     Past Surgical History:    Endoscopic ultrasound upper gastrointestinal tract  Head and  neck surgery  IR gastro jejunostomy tube change  Laparoscopic appendectomy  Orthopedic surgery  Laparoscopic assisted insertion tube gastrotomy  Tracheostomy x2  Vascular surgery     Family History:    Father: cancer     Social History:   The patient was brought in by EMS. Currently lives at home with mother.  Smoking Status: Former smoker  Smokeless Tobacco: Never Used  Alcohol Use: Negative  Drug Use: Negative  PCP: Carlos Gomez  Marital Status:  Single    Review of Systems   Unable to perform ROS: Patient nonverbal   Constitutional: Positive for fever.   Respiratory: Positive for cough.    Gastrointestinal: Negative for diarrhea and vomiting.   ROS limited secondary to the patient being non-verbal.      Physical Exam     Patient Vitals for the past 24 hrs:   BP Temp Temp src Pulse Heart Rate Resp SpO2 Height Weight   02/20/19 0459 100/49 -- -- 116 -- 22 97 % -- --   02/20/19 0205 106/66 100.7  F (38.2  C) Oral -- 134 18 94 % 1.829 m (6') 72.6 kg (160 lb)        Physical Exam  Eyes:  Sclera white; Pupils are equal and round  ENT:    External ears and nares normal, mucous membranes somewhat dry  CV:  Rate as above with regular rhythm   Resp:  Breath sounds clear and equal bilaterally, trach scar  GI:  Abdomen is soft, non-tender, non-distended    G tube site w/o cellulitis  MS:  Not moving extremities  Skin:  Warm and dry  Neuro:  Makes sounds but no speech, known TBI        Emergency Department Course   Imaging:  Radiographic findings were communicated with the patient and family who voiced understanding of the findings.    XR Chest 2 views:   Right basilar pneumonia. As per radiology.     Laboratory:  CBC: WBC: 16.5 (H), HGB: 12.4 (L), PLT: 10 (A)  CMP: Calcium 8.4 (L), Albumin 3.0 (L), o/w WNL (Creatinine: 0.77)     Influenza A/B: Negative     0220 Lactic acid: 1.7    Blood cultures (X2): pending     UA with Microscopic: GLC 30 (A), pH 8.5 (H), Protein albumin 10 (A), Bacteria few (A), o/w WNL  Urine culture:  pending    Interventions:  0228 NS 1L IV   0429 Tylenol 1000 mg G tube  0457 Zosyn 3.375 g IV  0523 Vancomycin 1500 mg IV  Please see MAR for full list of medications administered in the ED.     Emergency Department Course:  Nursing notes and vitals reviewed. (0256) I performed an exam of the patient as documented above.     ((0303) I spoke with the patient s mother, Savannah, regarding the patient s presentation here, and she was able to provide further history.     IV inserted. Medicine administered as documented above. Blood drawn. This was sent to the lab for further testing, results above.    The patient was sent for a chest x-ray while in the emergency department, findings above.     (0511)  I consulted with Dr. Daly of the hospitalist services. They are in agreement to accept the patient for admission.    Findings and plan explained to the Patient and mother who consents to admission. Discussed the patient with Dr. Daly, who will admit the patient to an inpatient medical bed for further monitoring, evaluation, and treatment.    Impression & Plan      Medical Decision Making:  Keyon Farias is a 58 year old male who is here for evaluation of a fever. He is nonverbal at baseline and all obtainable history was from the mother over the phone. He has not had any symptoms other than the fever, but she states he has had a history of pneumonia in the past but denies him having a history of UTIs. Workup here is consistent with SIRS secondary to pneumonia. On chart review, he has had aspiration pneumonia in the past and has had recent admissions. Based on this, broader spectrum antibiotics are appropriate for healthcare-associated pneumonia vs aspiration. Zosyn and vancomycin were ordered and admission arranged. He is full code.     Diagnosis:    ICD-10-CM    1. Healthcare-associated pneumonia J18.9    2. Sepsis, due to unspecified organism (H) A41.9        Disposition:  Admitted to Dr. Daly    Scribsamia Disclosure:  I,  Joy Amor, am serving as a scribe on 2/20/2019 at 6:10 AM to personally document services performed by Jennie Arguelles MD based on my observations and the provider's statements to me.      Joy Amor  2/20/2019    EMERGENCY DEPARTMENT       Jennie Arguelles MD  02/20/19 2050

## 2019-02-20 NOTE — CONSULTS
"CLINICAL NUTRITION SERVICES  -  ASSESSMENT NOTE      Recommendations Ordered by Registered Dietitian (RD):   Isosource 1.5 at 100 mL/hr x 12 hours (7 am - 7 pm) to provide 1800 kcal (25 kcal/kg), 82g protein (1.13 kcal/kg), 211g CHO, 18g fiber and 912mL water.   Std H2O flushes 60 mL q 4 hour while on IVF.       Malnutrition:   % Weight Loss:  None noted  % Intake:  No decreased intake noted  Subcutaneous Fat Loss:  None observed  Muscle Loss:  None observed  Fluid Retention:  None noted    Malnutrition Diagnosis: Patient does not meet two of the above criteria necessary for diagnosing malnutrition        REASON FOR ASSESSMENT  Keyon Farias is a 58 year old male seen by Registered Dietitian for Provider Order - Registered Dietitian to Assess and Order TF per Medical Nutrition Therapy Protocol      NUTRITION HISTORY  - Information obtained from the EMR.  Pt has been on TF via GJ tube since 8/2016. He recently had his GJ tube exchanged on 2/4, ~ 2 weeks ago. He has an 18Fr TORY GJ tube.    Pt is well-known to our service, his home TF regimen is as follows - Jevity 1.5 (5 to 5.5 cans daily) x 12 hours during the day to provide 7675-2505 kcal, 77-83g protein, ~260g CHO, ~27g fiber and ~900mL free water. H2O flushes 120 mL QID.  Pt lives with his mother, she likes to keep the TF rate at no more than 100 ml/hr. He is fed during the day rather than at night so that he can be up in the chair to prevent aspiration.     His home meds include Vit D, Benefiber, Florastor, Neutraphos, and Centrum Silver vitamins.        CURRENT NUTRITION ORDERS  Diet Order:     NPO       PHYSICAL FINDINGS  Observed  No nutrition-related physical findings observed  Obtained from Chart/Interdisciplinary Team  None noted    ANTHROPOMETRICS  Height: 6' 0\"  Weight: 160 lbs 0 oz (72.6 kg) - stated vs actual wt?  Body mass index is 21.7 kg/m .  Weight Status:  Normal BMI  IBW: 81 kg +/- 10%  % IBW: 90%  Weight History: With the exceptions of 12/24 and " 9/21, his weight has been stable since last fall.  Wt Readings from Last 15 Encounters:   02/20/19 72.6 kg (160 lb)   02/01/19 71.5 kg (157 lb 11.2 oz)   01/16/19 71.6 kg (157 lb 13.6 oz)   12/24/18 62 kg (136 lb 11 oz)   11/25/18 71.7 kg (158 lb 1.1 oz)   11/04/18 71.5 kg (157 lb 10.1 oz)   09/22/18 70.3 kg (155 lb)   09/21/18 78 kg (171 lb 15.3 oz)   09/03/18 72.3 kg (159 lb 6.3 oz)   07/07/18 74.7 kg (164 lb 10.9 oz)   05/19/18 71.8 kg (158 lb 3.2 oz)   05/05/18 77.4 kg (170 lb 10.2 oz)   04/04/18 72.6 kg (160 lb)   03/13/18 70.3 kg (155 lb)   02/21/18 73 kg (161 lb)       LABS  Labs reviewed    MEDICATIONS  NS IVF @ 100 mL/hr  Florastor      ASSESSED NUTRITION NEEDS PER APPROVED PRACTICE GUIDELINES:  Dosing Weight 72.6 kg - current wt  Estimated Energy Needs: 1270-8953 kcals (25-30 Kcal/Kg)  Justification: maintenance  Estimated Protein Needs:  grams protein (1.2-1.5 g pro/Kg)  Justification: preservation of lean body mass  Estimated Fluid Needs: 7245-4416  mL (1 mL/Kcal)  Justification: maintenance    MALNUTRITION:  % Weight Loss:  None noted  % Intake:  No decreased intake noted  Subcutaneous Fat Loss:  None observed  Muscle Loss:  None observed  Fluid Retention:  None noted    Malnutrition Diagnosis: Patient does not meet two of the above criteria necessary for diagnosing malnutrition      NUTRITION DIAGNOSIS:  No nutrition diagnosis identified at this time       NUTRITION INTERVENTIONS  Recommendations / Nutrition Prescription  Isosource 1.5 at 100 mL/hr x 12 hours (7 am - 7 pm) to provide 1800 kcal (25 kcal/kg), 82g protein (1.13 kcal/kg), 211g CHO, 18g fiber and 912mL water.   Std H2O flushes of 60 mL q 4 hour while on IVF..      Implementation  Nutrition education: No education needs at this time  EN Schedule - ordered the above TF      Nutrition Goals  EN to provide 100% of assessed needs      MONITORING AND EVALUATION:  Progress towards goals will be monitored and evaluated per protocol and  Practice Guidelines      Unique Schultz, RD  Pager [phone] (M-F)            [phone] ([facility])

## 2019-02-20 NOTE — PROGRESS NOTES
RECEIVING UNIT ED HANDOFF REVIEW    ED Nurse Handoff Report was reviewed by: Sharon Muir on February 20, 2019 at 6:31 AM
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
## 2019-02-20 NOTE — ED NOTES
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  Yes         If yes, is there guardianship documents in the Epic \"Code/ACP\" activity?  Yes         Guardian Notified?  Yes  [place] - Suicide Severity Rating Scale Completed?  Yes  If yes, what color did the patient score?  White    Patient Report: Initial Complaint: pt brought in via ambulance from his care facility to be evaluated for a fever of unknown origin.  Focused Assessment: Pt has a history of a TBI and requires total care. Pt is unable to communicate but is able to grunt and pull away if he is upset and smile if he is happy. Pt requires feeding through a G-tube.  Tests Performed: labs, chest xray  Abnormal Results:   Labs Ordered and Resulted from Time of ED Arrival Up to the Time of Departure from the ED   CBC WITH PLATELETS DIFFERENTIAL - Abnormal; Notable for the following components:       Result Value    WBC 16.5 (*)     Hemoglobin 12.4 (*)     Hematocrit 36.7 (*)     Absolute Neutrophil 11.8 (*)     Absolute Monocytes 1.4 (*)     All other components within normal limits "   COMPREHENSIVE METABOLIC PANEL - Abnormal; Notable for the following components:    Calcium 8.4 (*)     Albumin 3.0 (*)     All other components within normal limits   ROUTINE UA WITH MICROSCOPIC - Abnormal; Notable for the following components:    Glucose Urine 30 (*)     pH Urine 8.5 (*)     Protein Albumin Urine 10 (*)     Bacteria Urine Few (*)     All other components within normal limits   LACTIC ACID WHOLE BLOOD   PERIPHERAL IV CATHETER   BLOOD CULTURE   BLOOD CULTURE   INFLUENZA A/B ANTIGEN   URINE CULTURE AEROBIC BACTERIAL     Treatments provided: tylenol via G-tube 1000 mg, 1L NS bolus, zosyn 3.375 g, vancomycin 1500 mg    Family Comments: pt is here alone but mother is at home and wants to be kept updated. Her name is Savannah and her number is [phone]    OBS brochure/video discussed/provided to patient/family: No              Name of person given brochure if not patient: pt is alone and unable to understand OBS video              Relationship to patient: na    ED Medications:   Medications   vancomycin (VANCOCIN) 1,500 mg in sodium chloride 0.9 % 250 mL intermittent infusion (1,500 mg Intravenous New Bag 2/20/19 0523)   0.9% sodium chloride BOLUS (0 mLs Intravenous Stopped 2/20/19 0558)   acetaminophen (TYLENOL) solution 1,000 mg (1,000 mg Per G Tube Given 2/20/19 0429)   piperacillin-tazobactam (ZOSYN) 3.375 g vial to attach to  mL bag (0 g Intravenous Stopped 2/20/19 0558)       Drips infusing?:  Yes    For the majority of the shift this patient was Green.   Interventions performed were none.    Severe Sepsis OR Septic Shock Diagnosis Present: No    To be done/followed up on inpatient unit:  nothing noted at this time. Vancomycin is currently infusing.    ED NURSE PHONE NUMBER: [phone]

## 2019-02-20 NOTE — PLAN OF CARE
Patient admitted this morning with pneumonia. VSS. On RA. LS are clear. Skin is intact. Incontinent of urine x3. Jessica area reddened and patient seems uncomfortable when cleaned. Antifungal powder ordered. IVFs infusing. Strict NPO, TF ordered. Jejunum part of TF unable to flush. MD aware. IR aware. Able to flush/give meds through gastric part of TF. IR will try to fix it. Mom and POA updated. PCDs on. Patient alert but nonverbal. Continue to monitor.

## 2019-02-20 NOTE — IR NOTE
Pt seen in on floor in pt room for a plugged  GJ Tube was successfully unclogged in the [facility] using Hot water cola and sliding scale Amplatz 75 cm wire. The tube was flushed multiple times with water to conclude the visit. Discharge instructions will be given by the [facility] staff.

## 2019-02-21 LAB
ANION GAP SERPL CALCULATED.3IONS-SCNC: 4 MMOL/L (ref 3–14)
BACTERIA SPEC CULT: NO GROWTH
BASOPHILS # BLD AUTO: 0 10E9/L (ref 0–0.2)
BASOPHILS NFR BLD AUTO: 0.2 %
BUN SERPL-MCNC: 12 MG/DL (ref 7–30)
CALCIUM SERPL-MCNC: 8.2 MG/DL (ref 8.5–10.1)
CHLORIDE SERPL-SCNC: 111 MMOL/L (ref 94–109)
CO2 SERPL-SCNC: 26 MMOL/L (ref 20–32)
CREAT SERPL-MCNC: 0.77 MG/DL (ref 0.66–1.25)
DIFFERENTIAL METHOD BLD: ABNORMAL
EOSINOPHIL # BLD AUTO: 0 10E9/L (ref 0–0.7)
EOSINOPHIL NFR BLD AUTO: 0.3 %
ERYTHROCYTE [DISTWIDTH] IN BLOOD BY AUTOMATED COUNT: 14.6 % (ref 10–15)
GFR SERPL CREATININE-BSD FRML MDRD: >90 ML/MIN/{1.73_M2}
GLUCOSE SERPL-MCNC: 193 MG/DL (ref 70–99)
HCT VFR BLD AUTO: 33.2 % (ref 40–53)
HGB BLD-MCNC: 10.9 G/DL (ref 13.3–17.7)
IMM GRANULOCYTES # BLD: 0 10E9/L (ref 0–0.4)
IMM GRANULOCYTES NFR BLD: 0.2 %
LYMPHOCYTES # BLD AUTO: 2.1 10E9/L (ref 0.8–5.3)
LYMPHOCYTES NFR BLD AUTO: 21.7 %
Lab: NORMAL
MCH RBC QN AUTO: 27.8 PG (ref 26.5–33)
MCHC RBC AUTO-ENTMCNC: 32.8 G/DL (ref 31.5–36.5)
MCV RBC AUTO: 85 FL (ref 78–100)
MONOCYTES # BLD AUTO: 0.6 10E9/L (ref 0–1.3)
MONOCYTES NFR BLD AUTO: 6.3 %
NEUTROPHILS # BLD AUTO: 7 10E9/L (ref 1.6–8.3)
NEUTROPHILS NFR BLD AUTO: 71.3 %
NRBC # BLD AUTO: 0 10*3/UL
NRBC BLD AUTO-RTO: 0 /100
PLATELET # BLD AUTO: 162 10E9/L (ref 150–450)
POTASSIUM SERPL-SCNC: 3.9 MMOL/L (ref 3.4–5.3)
RBC # BLD AUTO: 3.92 10E12/L (ref 4.4–5.9)
SODIUM SERPL-SCNC: 141 MMOL/L (ref 133–144)
SPECIMEN SOURCE: NORMAL
WBC # BLD AUTO: 9.8 10E9/L (ref 4–11)

## 2019-02-21 PROCEDURE — 85025 COMPLETE CBC W/AUTO DIFF WBC: CPT | Performed by: INTERNAL MEDICINE

## 2019-02-21 PROCEDURE — 36415 COLL VENOUS BLD VENIPUNCTURE: CPT | Performed by: INTERNAL MEDICINE

## 2019-02-21 PROCEDURE — 99232 SBSQ HOSP IP/OBS MODERATE 35: CPT | Performed by: INTERNAL MEDICINE

## 2019-02-21 PROCEDURE — 25000128 H RX IP 250 OP 636: Performed by: INTERNAL MEDICINE

## 2019-02-21 PROCEDURE — 12000000 ZZH R&B MED SURG/OB

## 2019-02-21 PROCEDURE — 25000132 ZZH RX MED GY IP 250 OP 250 PS 637: Mod: GY | Performed by: INTERNAL MEDICINE

## 2019-02-21 PROCEDURE — 25000125 ZZHC RX 250: Performed by: INTERNAL MEDICINE

## 2019-02-21 PROCEDURE — A9270 NON-COVERED ITEM OR SERVICE: HCPCS | Mod: GY | Performed by: INTERNAL MEDICINE

## 2019-02-21 PROCEDURE — 80048 BASIC METABOLIC PNL TOTAL CA: CPT | Performed by: INTERNAL MEDICINE

## 2019-02-21 PROCEDURE — 87040 BLOOD CULTURE FOR BACTERIA: CPT | Performed by: INTERNAL MEDICINE

## 2019-02-21 PROCEDURE — 99207 ZZC CDG-MDM COMPONENT: MEETS LOW - DOWN CODED: CPT | Performed by: INTERNAL MEDICINE

## 2019-02-21 RX ORDER — HYDROCORTISONE 10 MG/1
10 TABLET ORAL EVERY EVENING
Status: DISCONTINUED | OUTPATIENT
Start: 2019-02-22 | End: 2019-02-22 | Stop reason: HOSPADM

## 2019-02-21 RX ORDER — HYDROCORTISONE 20 MG/1
20 TABLET ORAL DAILY
Status: DISCONTINUED | OUTPATIENT
Start: 2019-02-22 | End: 2019-02-22 | Stop reason: HOSPADM

## 2019-02-21 RX ADMIN — LEVOTHYROXINE SODIUM 137 MCG: 137 TABLET ORAL at 09:33

## 2019-02-21 RX ADMIN — HYDROCORTISONE SODIUM SUCCINATE 50 MG: 100 INJECTION, POWDER, FOR SOLUTION INTRAMUSCULAR; INTRAVENOUS at 00:17

## 2019-02-21 RX ADMIN — AMOXICILLIN AND CLAVULANATE POTASSIUM 1 TABLET: 875; 125 TABLET, FILM COATED ORAL at 18:43

## 2019-02-21 RX ADMIN — BRIVARACETAM 100 MG: 10 SOLUTION ORAL at 09:34

## 2019-02-21 RX ADMIN — BRIVARACETAM 100 MG: 10 SOLUTION ORAL at 21:45

## 2019-02-21 RX ADMIN — SODIUM BICARBONATE 20 MG: 84 INJECTION, SOLUTION INTRAVENOUS at 09:33

## 2019-02-21 RX ADMIN — PIPERACILLIN SODIUM,TAZOBACTAM SODIUM 3.38 G: 3; .375 INJECTION, POWDER, FOR SOLUTION INTRAVENOUS at 10:47

## 2019-02-21 RX ADMIN — CARBAMAZEPINE 150 MG: 100 SUSPENSION ORAL at 09:33

## 2019-02-21 RX ADMIN — CARBAMAZEPINE 150 MG: 100 SUSPENSION ORAL at 02:40

## 2019-02-21 RX ADMIN — CARBAMAZEPINE 150 MG: 100 SUSPENSION ORAL at 15:04

## 2019-02-21 RX ADMIN — Medication 250 MG: at 09:33

## 2019-02-21 RX ADMIN — HYDROCORTISONE SODIUM SUCCINATE 50 MG: 100 INJECTION, POWDER, FOR SOLUTION INTRAMUSCULAR; INTRAVENOUS at 18:44

## 2019-02-21 RX ADMIN — CARBAMAZEPINE 150 MG: 100 SUSPENSION ORAL at 21:45

## 2019-02-21 RX ADMIN — SODIUM BICARBONATE 20 MG: 84 INJECTION, SOLUTION INTRAVENOUS at 21:45

## 2019-02-21 RX ADMIN — HYDROCORTISONE SODIUM SUCCINATE 50 MG: 100 INJECTION, POWDER, FOR SOLUTION INTRAMUSCULAR; INTRAVENOUS at 10:47

## 2019-02-21 RX ADMIN — PIPERACILLIN SODIUM,TAZOBACTAM SODIUM 3.38 G: 3; .375 INJECTION, POWDER, FOR SOLUTION INTRAVENOUS at 04:49

## 2019-02-21 ASSESSMENT — ACTIVITIES OF DAILY LIVING (ADL)
ADLS_ACUITY_SCORE: 42
ADLS_ACUITY_SCORE: 44
ADLS_ACUITY_SCORE: 44
ADLS_ACUITY_SCORE: 42
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
## 2019-02-21 NOTE — PROVIDER NOTIFICATION
MD Notification    Notified Person: MD    Notified Person Name: Dr. Daly    Notification Date/Time: 2/21/2019, 5:02 PM    Notification Interaction: over the phone    Purpose of Notification: Pt BC came back positive for grom pos cocci in clusters.     Orders Received: BC repeat today    Comments: MD thinks it is a contaminant

## 2019-02-21 NOTE — PROGRESS NOTES
[facility]  Patient is currently open to home care services with [facility]. The patient is currently receiving PT services. [facility]  and team have been notified of patient admission. [facility] liaison will continue to follow patient during stay.  If appropriate provide orders to resume home care at time of discharge.

## 2019-02-21 NOTE — PLAN OF CARE
Celi had VSS. On RA, sats in the 90s. LS are clear. Patient seems comfortable in bed, smiling, giving thumps up. Incontinent of urine x3. Pulled out IV, new one place on L arm. TF running at 100ml/hr, with flushes every 4 hours. Jessica area reddened, antifungal powder applied. Blanchable buttocks, barrier cream applied. Mom visited and supportive. Continue to monitor.

## 2019-02-21 NOTE — PROGRESS NOTES
Admission    Patient arrives to room 625 via cart from ED.  Care plan note: will do.    Inpatient nursing criteria listed below were met:    PCD's Documented: Yes  Skin issues/needs documented :Yes  Isolation education started/completed Yes  Patient allergies verified with patient: Yes  Verified completion of [place] Risk Assessment Tool:  Yes  Verified completion of Guardianship screening tool: Yes  Fall Prevention: Care plan updated, Education given and documented Yes  Care Plan initiated: Yes  Home medications documented in belongings flowsheet: NA  Patient belongings documented in belongings flowsheet: NA  Reminder note (belongings/ medications) placed in discharge instructions:NA  Admission profile/ required documentation complete: Yes

## 2019-02-21 NOTE — PROGRESS NOTES
VSS on room air. Lung sounds are clear bilateral. Skin is intact. Patient is nonverbal, but A&O. Incontinent of urine x 4. Perineal area reddened, being treated with miconazole powder. Placed condom cath for the night to help promote rest. Strict NPO. IR unplugged Jejunum port and has TF running, which was started late. Will run to 0000 and needs to start back up again @ 0700 tmw morning. Gastric port saline flushed and patent for meds. IVFs infusing, on zosyn every 6 hrs. Continue to monitor.

## 2019-02-22 VITALS
BODY MASS INDEX: 22.22 KG/M2 | TEMPERATURE: 97.4 F | DIASTOLIC BLOOD PRESSURE: 72 MMHG | HEIGHT: 72 IN | SYSTOLIC BLOOD PRESSURE: 101 MMHG | WEIGHT: 164.02 LBS | RESPIRATION RATE: 18 BRPM | HEART RATE: 93 BPM | OXYGEN SATURATION: 99 %

## 2019-02-22 PROCEDURE — 25000132 ZZH RX MED GY IP 250 OP 250 PS 637: Mod: GY | Performed by: INTERNAL MEDICINE

## 2019-02-22 PROCEDURE — 99238 HOSP IP/OBS DSCHRG MGMT 30/<: CPT | Performed by: INTERNAL MEDICINE

## 2019-02-22 PROCEDURE — 25000125 ZZHC RX 250: Performed by: INTERNAL MEDICINE

## 2019-02-22 PROCEDURE — A9270 NON-COVERED ITEM OR SERVICE: HCPCS | Mod: GY | Performed by: INTERNAL MEDICINE

## 2019-02-22 RX ADMIN — HYDROCORTISONE 20 MG: 20 TABLET ORAL at 08:39

## 2019-02-22 RX ADMIN — CARBAMAZEPINE 150 MG: 100 SUSPENSION ORAL at 08:40

## 2019-02-22 RX ADMIN — Medication 250 MG: at 08:39

## 2019-02-22 RX ADMIN — CARBAMAZEPINE 150 MG: 100 SUSPENSION ORAL at 03:58

## 2019-02-22 RX ADMIN — SODIUM BICARBONATE 20 MG: 84 INJECTION, SOLUTION INTRAVENOUS at 08:40

## 2019-02-22 RX ADMIN — LEVOTHYROXINE SODIUM 137 MCG: 137 TABLET ORAL at 08:39

## 2019-02-22 RX ADMIN — BRIVARACETAM 100 MG: 10 SOLUTION ORAL at 08:40

## 2019-02-22 RX ADMIN — AMOXICILLIN AND CLAVULANATE POTASSIUM 1 TABLET: 875; 125 TABLET, FILM COATED ORAL at 08:39

## 2019-02-22 ASSESSMENT — ACTIVITIES OF DAILY LIVING (ADL)
ADLS_ACUITY_SCORE: 42
ADLS_ACUITY_SCORE: 44
ADLS_ACUITY_SCORE: 42
ADLS_ACUITY_SCORE: 42

## 2019-02-22 ASSESSMENT — MIFFLIN-ST. JEOR: SCORE: 1612

## 2019-02-22 NOTE — CONSULTS
Care Transition Initial Assessment - RN  Met with:  Family. Mother  DATA   Active Problems:    Sepsis (H)     Cognitive Status: alert and oriented.   Contact information and PCP information verified: Yes  Lives With: parent(s)    Insurance concerns: No Insurance issues identified  ASSESSMENT  Patient currently receives the following services: [facility]       Identified issues/concerns regarding health management: Patient currently receives the following services:   12hrs RN through [facility] agency ([phone]). The  is Lisa.  11.5hrs NA through [facility] ([phone]).   Receives tube feeding supplies from [facility].  Mother has speciality braces, w/c and swivel device for pivoting. Patient also has Physical Therapy through   Mother feels they are managing well in the home.   Information sent to all agencies regarding resumption of care.  Patients mother requested transportation. Stretcher ride set for 1:30 pm with [employer].  PLAN  Financial costs for the patient include None .  Patient given options and choices for discharge  yes.  Patient/family is agreeable to the plan?  Yes:   Patient anticipates discharging to home with home care .        Patient anticipates needs for home equipment: Yes  Plan/Disposition: Home   Appointments: 2/27 230 pm      Care  (CTS) will continue to follow as needed.
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
## 2019-02-22 NOTE — PLAN OF CARE
Pt alert and uses thumbs up and nodding of the head to communicate with staff. Pt had R hemiplegia and was turned and repositioned q2h. Pt incontinent and urine and stool- external catheter placed at night. Pt has erythema on sacrum and groin area- barrier cream and miconazole applied with every incontinent change. Tube feeds stopped around 1900 and G-J tube flushed every 4 hours with 150mL of water. BC that were drawn on 2/20 came back with Gram pos cocci in clusters. Dr. Daly updated- thought it was a contaminant, so new BC were drawn. Received another call stating BC was pos for staph-updated Dr. Hernandez. Pt called out for pain with L upper PIV- redness and swelling noted around the site. IV removed and new one placed in L hand.

## 2019-02-22 NOTE — PLAN OF CARE
Patient alert/orient X2/HECTOR due to TBI.  Lungs clear on RA.  Tube feeding infusing at 100cc/hr.  Turned Q2 hours/incontinent of B/B.  Discharging to home today with [facility].  Vss, denies pain.

## 2019-02-22 NOTE — PROGRESS NOTES
Hospitalist update    Blood culture from 2/20 now returned with CN staph.  Earlier was GPC in clusters, thought to be contaminant.  Repeat blood culture sent earlier today.  Note he is on augmentin for aspiration pneumonia with improving fever and WBC count.   With CN staph from 1/2 bottles this is likely contaminant.  Continue current therapy/plan.

## 2019-02-22 NOTE — PLAN OF CARE
Pt alert. Nonverbal, but can use thumbs up/nodding to communicate with staff. Total cares. T/R, up with a lift. R sided hemiplegia. VSS on RA. Dnies pain. Strict NPO, oral cares done. LS clear. Incontinent of B/Bl, no BM this shift. External catheter in place at night. Redness on sacrum/groin area, barrier cream applied. G-J tube, 150 ml flush q4h. Tele NSR. Possible discharge today.

## 2019-02-26 LAB
BACTERIA SPEC CULT: NO GROWTH
Lab: NORMAL
SPECIMEN SOURCE: NORMAL

## 2019-02-27 ENCOUNTER — APPOINTMENT (OUTPATIENT)
Dept: GENERAL RADIOLOGY | Facility: CLINIC | Age: 59
DRG: 871 | End: 2019-02-27
Attending: EMERGENCY MEDICINE
Payer: MEDICARE

## 2019-02-27 ENCOUNTER — HOSPITAL ENCOUNTER (INPATIENT)
Facility: CLINIC | Age: 59
LOS: 5 days | Discharge: HOME HEALTH | DRG: 871 | End: 2019-03-04
Attending: EMERGENCY MEDICINE | Admitting: INTERNAL MEDICINE
Payer: MEDICARE

## 2019-02-27 DIAGNOSIS — J69.0 ASPIRATION PNEUMONIA OF BOTH LOWER LOBES DUE TO GASTRIC SECRETIONS (H): ICD-10-CM

## 2019-02-27 DIAGNOSIS — R65.20 SEVERE SEPSIS (H): Primary | ICD-10-CM

## 2019-02-27 DIAGNOSIS — A41.9 SEPSIS, DUE TO UNSPECIFIED ORGANISM: ICD-10-CM

## 2019-02-27 DIAGNOSIS — J69.0 ASPIRATION PNEUMONIA OF RIGHT LOWER LOBE, UNSPECIFIED ASPIRATION PNEUMONIA TYPE (H): ICD-10-CM

## 2019-02-27 DIAGNOSIS — A41.9 SEVERE SEPSIS (H): Primary | ICD-10-CM

## 2019-02-27 LAB
ALBUMIN SERPL-MCNC: 3.3 G/DL (ref 3.4–5)
ALBUMIN UR-MCNC: NEGATIVE MG/DL
ALP SERPL-CCNC: 99 U/L (ref 40–150)
ALT SERPL W P-5'-P-CCNC: 28 U/L (ref 0–70)
ANION GAP SERPL CALCULATED.3IONS-SCNC: 10 MMOL/L (ref 3–14)
APPEARANCE UR: CLEAR
AST SERPL W P-5'-P-CCNC: 17 U/L (ref 0–45)
BACTERIA SPEC CULT: NO GROWTH
BASOPHILS # BLD AUTO: 0 10E9/L (ref 0–0.2)
BASOPHILS NFR BLD AUTO: 0.2 %
BILIRUB SERPL-MCNC: 0.2 MG/DL (ref 0.2–1.3)
BILIRUB UR QL STRIP: NEGATIVE
BUN SERPL-MCNC: 16 MG/DL (ref 7–30)
CALCIUM SERPL-MCNC: 8.2 MG/DL (ref 8.5–10.1)
CHLORIDE SERPL-SCNC: 96 MMOL/L (ref 94–109)
CO2 SERPL-SCNC: 26 MMOL/L (ref 20–32)
COLOR UR AUTO: YELLOW
CREAT SERPL-MCNC: 0.69 MG/DL (ref 0.66–1.25)
DIFFERENTIAL METHOD BLD: ABNORMAL
EOSINOPHIL # BLD AUTO: 0.4 10E9/L (ref 0–0.7)
EOSINOPHIL NFR BLD AUTO: 2.3 %
ERYTHROCYTE [DISTWIDTH] IN BLOOD BY AUTOMATED COUNT: 14.1 % (ref 10–15)
FLUAV+FLUBV AG SPEC QL: NEGATIVE
FLUAV+FLUBV AG SPEC QL: NEGATIVE
GFR SERPL CREATININE-BSD FRML MDRD: >90 ML/MIN/{1.73_M2}
GLUCOSE SERPL-MCNC: 91 MG/DL (ref 70–99)
GLUCOSE UR STRIP-MCNC: 150 MG/DL
HCT VFR BLD AUTO: 38.8 % (ref 40–53)
HGB BLD-MCNC: 13.1 G/DL (ref 13.3–17.7)
HGB UR QL STRIP: NEGATIVE
IMM GRANULOCYTES # BLD: 0.1 10E9/L (ref 0–0.4)
IMM GRANULOCYTES NFR BLD: 0.4 %
KETONES UR STRIP-MCNC: NEGATIVE MG/DL
LACTATE BLD-SCNC: 4.2 MMOL/L (ref 0.7–2)
LEUKOCYTE ESTERASE UR QL STRIP: NEGATIVE
LYMPHOCYTES # BLD AUTO: 2.5 10E9/L (ref 0.8–5.3)
LYMPHOCYTES NFR BLD AUTO: 15.7 %
Lab: NORMAL
MCH RBC QN AUTO: 27.9 PG (ref 26.5–33)
MCHC RBC AUTO-ENTMCNC: 33.8 G/DL (ref 31.5–36.5)
MCV RBC AUTO: 83 FL (ref 78–100)
MONOCYTES # BLD AUTO: 0.8 10E9/L (ref 0–1.3)
MONOCYTES NFR BLD AUTO: 5.1 %
NEUTROPHILS # BLD AUTO: 12.2 10E9/L (ref 1.6–8.3)
NEUTROPHILS NFR BLD AUTO: 76.3 %
NITRATE UR QL: NEGATIVE
NRBC # BLD AUTO: 0 10*3/UL
NRBC BLD AUTO-RTO: 0 /100
PH UR STRIP: 7.5 PH (ref 5–7)
PLATELET # BLD AUTO: 251 10E9/L (ref 150–450)
POTASSIUM SERPL-SCNC: 4.5 MMOL/L (ref 3.4–5.3)
PROT SERPL-MCNC: 8.3 G/DL (ref 6.8–8.8)
RBC # BLD AUTO: 4.69 10E12/L (ref 4.4–5.9)
SODIUM SERPL-SCNC: 132 MMOL/L (ref 133–144)
SOURCE: ABNORMAL
SP GR UR STRIP: 1.01 (ref 1–1.03)
SPECIMEN SOURCE: NORMAL
SPECIMEN SOURCE: NORMAL
UROBILINOGEN UR STRIP-MCNC: NORMAL MG/DL (ref 0–2)
WBC # BLD AUTO: 16 10E9/L (ref 4–11)

## 2019-02-27 PROCEDURE — A9270 NON-COVERED ITEM OR SERVICE: HCPCS | Mod: GY | Performed by: EMERGENCY MEDICINE

## 2019-02-27 PROCEDURE — 80053 COMPREHEN METABOLIC PANEL: CPT | Performed by: EMERGENCY MEDICINE

## 2019-02-27 PROCEDURE — 21000001 ZZH R&B HEART CARE

## 2019-02-27 PROCEDURE — 71046 X-RAY EXAM CHEST 2 VIEWS: CPT

## 2019-02-27 PROCEDURE — 99285 EMERGENCY DEPT VISIT HI MDM: CPT | Mod: 25

## 2019-02-27 PROCEDURE — 25000132 ZZH RX MED GY IP 250 OP 250 PS 637: Mod: GY | Performed by: EMERGENCY MEDICINE

## 2019-02-27 PROCEDURE — 36415 COLL VENOUS BLD VENIPUNCTURE: CPT

## 2019-02-27 PROCEDURE — 93005 ELECTROCARDIOGRAM TRACING: CPT

## 2019-02-27 PROCEDURE — 85025 COMPLETE CBC W/AUTO DIFF WBC: CPT | Performed by: EMERGENCY MEDICINE

## 2019-02-27 PROCEDURE — 83605 ASSAY OF LACTIC ACID: CPT | Performed by: EMERGENCY MEDICINE

## 2019-02-27 PROCEDURE — 25000128 H RX IP 250 OP 636: Performed by: EMERGENCY MEDICINE

## 2019-02-27 PROCEDURE — 25800030 ZZH RX IP 258 OP 636: Performed by: EMERGENCY MEDICINE

## 2019-02-27 PROCEDURE — 96361 HYDRATE IV INFUSION ADD-ON: CPT

## 2019-02-27 PROCEDURE — 96375 TX/PRO/DX INJ NEW DRUG ADDON: CPT

## 2019-02-27 PROCEDURE — 99223 1ST HOSP IP/OBS HIGH 75: CPT | Mod: AI | Performed by: INTERNAL MEDICINE

## 2019-02-27 PROCEDURE — 96365 THER/PROPH/DIAG IV INF INIT: CPT

## 2019-02-27 PROCEDURE — 87804 INFLUENZA ASSAY W/OPTIC: CPT | Performed by: EMERGENCY MEDICINE

## 2019-02-27 PROCEDURE — 87040 BLOOD CULTURE FOR BACTERIA: CPT | Performed by: EMERGENCY MEDICINE

## 2019-02-27 PROCEDURE — 81003 URINALYSIS AUTO W/O SCOPE: CPT | Performed by: EMERGENCY MEDICINE

## 2019-02-27 RX ORDER — ACETAMINOPHEN 650 MG/1
650 SUPPOSITORY RECTAL EVERY 4 HOURS PRN
Status: DISCONTINUED | OUTPATIENT
Start: 2019-02-27 | End: 2019-03-04 | Stop reason: HOSPADM

## 2019-02-27 RX ORDER — NALOXONE HYDROCHLORIDE 0.4 MG/ML
.1-.4 INJECTION, SOLUTION INTRAMUSCULAR; INTRAVENOUS; SUBCUTANEOUS
Status: DISCONTINUED | OUTPATIENT
Start: 2019-02-27 | End: 2019-03-04 | Stop reason: HOSPADM

## 2019-02-27 RX ORDER — PIPERACILLIN SODIUM, TAZOBACTAM SODIUM 4; .5 G/20ML; G/20ML
4.5 INJECTION, POWDER, LYOPHILIZED, FOR SOLUTION INTRAVENOUS EVERY 6 HOURS
Status: DISCONTINUED | OUTPATIENT
Start: 2019-02-28 | End: 2019-03-04 | Stop reason: HOSPADM

## 2019-02-27 RX ORDER — LIDOCAINE 40 MG/G
CREAM TOPICAL
Status: DISCONTINUED | OUTPATIENT
Start: 2019-02-27 | End: 2019-03-04 | Stop reason: HOSPADM

## 2019-02-27 RX ORDER — PIPERACILLIN SODIUM, TAZOBACTAM SODIUM 4; .5 G/20ML; G/20ML
4.5 INJECTION, POWDER, LYOPHILIZED, FOR SOLUTION INTRAVENOUS ONCE
Status: COMPLETED | OUTPATIENT
Start: 2019-02-27 | End: 2019-02-27

## 2019-02-27 RX ORDER — ACETAMINOPHEN 325 MG/1
650 TABLET ORAL EVERY 4 HOURS PRN
Status: DISCONTINUED | OUTPATIENT
Start: 2019-02-27 | End: 2019-03-04 | Stop reason: HOSPADM

## 2019-02-27 RX ORDER — HYDROCORTISONE 20 MG/1
60 TABLET ORAL EVERY MORNING
Status: DISCONTINUED | OUTPATIENT
Start: 2019-02-28 | End: 2019-03-01

## 2019-02-27 RX ORDER — ONDANSETRON 4 MG/1
4 TABLET, ORALLY DISINTEGRATING ORAL EVERY 6 HOURS PRN
Status: DISCONTINUED | OUTPATIENT
Start: 2019-02-27 | End: 2019-03-04 | Stop reason: HOSPADM

## 2019-02-27 RX ORDER — ONDANSETRON 2 MG/ML
4 INJECTION INTRAMUSCULAR; INTRAVENOUS EVERY 6 HOURS PRN
Status: DISCONTINUED | OUTPATIENT
Start: 2019-02-27 | End: 2019-03-04 | Stop reason: HOSPADM

## 2019-02-27 RX ORDER — CARBAMAZEPINE 100 MG/5ML
150 SUSPENSION ORAL EVERY 6 HOURS
Status: DISCONTINUED | OUTPATIENT
Start: 2019-02-28 | End: 2019-03-04 | Stop reason: HOSPADM

## 2019-02-27 RX ORDER — NITROGLYCERIN 0.4 MG/1
0.4 TABLET SUBLINGUAL EVERY 5 MIN PRN
Status: DISCONTINUED | OUTPATIENT
Start: 2019-02-27 | End: 2019-03-04 | Stop reason: HOSPADM

## 2019-02-27 RX ORDER — SODIUM CHLORIDE, SODIUM LACTATE, POTASSIUM CHLORIDE, CALCIUM CHLORIDE 600; 310; 30; 20 MG/100ML; MG/100ML; MG/100ML; MG/100ML
INJECTION, SOLUTION INTRAVENOUS CONTINUOUS
Status: DISCONTINUED | OUTPATIENT
Start: 2019-02-28 | End: 2019-03-01

## 2019-02-27 RX ADMIN — HYDROCORTISONE SODIUM SUCCINATE 100 MG: 100 INJECTION, POWDER, FOR SOLUTION INTRAMUSCULAR; INTRAVENOUS at 21:34

## 2019-02-27 RX ADMIN — SODIUM CHLORIDE 500 ML: 9 INJECTION, SOLUTION INTRAVENOUS at 23:22

## 2019-02-27 RX ADMIN — SODIUM CHLORIDE 1000 ML: 9 INJECTION, SOLUTION INTRAVENOUS at 20:52

## 2019-02-27 RX ADMIN — SODIUM CHLORIDE 1000 ML: 9 INJECTION, SOLUTION INTRAVENOUS at 21:00

## 2019-02-27 RX ADMIN — PIPERACILLIN AND TAZOBACTAM 4.5 G: 4; .5 INJECTION, POWDER, FOR SOLUTION INTRAVENOUS at 21:37

## 2019-02-27 RX ADMIN — ACETAMINOPHEN 650 MG: 160 SUSPENSION ORAL at 21:46

## 2019-02-27 RX ADMIN — VANCOMYCIN HYDROCHLORIDE 1750 MG: 5 INJECTION, POWDER, LYOPHILIZED, FOR SOLUTION INTRAVENOUS at 22:12

## 2019-02-27 ASSESSMENT — ENCOUNTER SYMPTOMS
FEVER: 1
COUGH: 1
VOMITING: 0
DIARRHEA: 0
SHORTNESS OF BREATH: 0

## 2019-02-27 NOTE — LETTER
Transition Communication Hand-off for Care Transitions to Next Level of Care Provider    Name: Keyon Farias  : 1960  MRN #: [id number]  Primary Care Provider: Carlos Gomez     Primary Clinic: [address] [address]     Reason for Hospitalization:  Sepsis, due to unspecified organism (H) [A41.9]  Aspiration pneumonia of right lower lobe, unspecified aspiration pneumonia type (H) [J69.0]  Admit Date/Time: 2019  8:33 PM  Discharge Date: 3/4/2019  Payor Source: Payor: MEDICARE / Plan: MEDICARE / Product Type: Medicare /     Readmission Assessment Measure (HANS) Risk Score/category: exterme         Reason for Communication Hand-off Referral: Avoidable readmission within 30 days    Discharge Plan: home with resumption of homecare       Concern for non-adherence with plan of care:   Y/N N  Discharge Needs Assessment:  Needs      Most Recent Value   # of Referrals Placed by CTS  Scheduled Follow-up appointments          Already enrolled in Tele-monitoring program and name of program:  N  Follow-up specialty is recommended: No    Follow-up plan:  , m with Dr. Gomez  Any outstanding tests or procedures:        Referrals     Future Labs/Procedures    Home Care OT Referral for Hospital Discharge     Comments:    OT to eval and treat    Your provider has ordered home care - occupational therapy. If you have not been contacted within 2 days of your discharge please call the department phone number listed on the top of this document.    Home Care PT Referral for Hospital Discharge     Comments:    PT to eval and treat    Your provider has ordered home care - physical therapy. If you have not been contacted within 2 days of your discharge please call the department phone number listed on the top of this document.    Home Care SLP Referral for Hospital Discharge     Comments:    SLP to eval and treat    Your provider has ordered home care - speech therapy. If you have not been  contacted within 2 days of your discharge please call the department phone number listed on the top of this document.            Key Recommendations:  discharge home with resumption of HC.  Pt has frequent admissions for asp. Pneumonia.  Did try to discuss palliative care w/ mom/guardian Sav[place].  She was not open to this yet.  Please continue to follow this patient post hospital.     Alee Sousa    AVS/Discharge Summary is the source of truth; this is a helpful guide for improved communication of patient story

## 2019-02-28 LAB
ANION GAP SERPL CALCULATED.3IONS-SCNC: 3 MMOL/L (ref 3–14)
BACTERIA SPEC CULT: ABNORMAL
BUN SERPL-MCNC: 13 MG/DL (ref 7–30)
CALCIUM SERPL-MCNC: 7.9 MG/DL (ref 8.5–10.1)
CHLORIDE SERPL-SCNC: 109 MMOL/L (ref 94–109)
CO2 SERPL-SCNC: 27 MMOL/L (ref 20–32)
CREAT SERPL-MCNC: 0.74 MG/DL (ref 0.66–1.25)
ERYTHROCYTE [DISTWIDTH] IN BLOOD BY AUTOMATED COUNT: 14.3 % (ref 10–15)
GFR SERPL CREATININE-BSD FRML MDRD: >90 ML/MIN/{1.73_M2}
GLUCOSE BLDC GLUCOMTR-MCNC: 109 MG/DL (ref 70–99)
GLUCOSE SERPL-MCNC: 129 MG/DL (ref 70–99)
HCT VFR BLD AUTO: 33.1 % (ref 40–53)
HGB BLD-MCNC: 11 G/DL (ref 13.3–17.7)
INTERPRETATION ECG - MUSE: NORMAL
LACTATE BLD-SCNC: 2.3 MMOL/L (ref 0.7–2)
Lab: ABNORMAL
MCH RBC QN AUTO: 27.4 PG (ref 26.5–33)
MCHC RBC AUTO-ENTMCNC: 33.2 G/DL (ref 31.5–36.5)
MCV RBC AUTO: 82 FL (ref 78–100)
PLATELET # BLD AUTO: 222 10E9/L (ref 150–450)
POTASSIUM SERPL-SCNC: 4.8 MMOL/L (ref 3.4–5.3)
RBC # BLD AUTO: 4.02 10E12/L (ref 4.4–5.9)
SODIUM SERPL-SCNC: 139 MMOL/L (ref 133–144)
SPECIMEN SOURCE: ABNORMAL
WBC # BLD AUTO: 20.4 10E9/L (ref 4–11)

## 2019-02-28 PROCEDURE — 00000146 ZZHCL STATISTIC GLUCOSE BY METER IP

## 2019-02-28 PROCEDURE — 36415 COLL VENOUS BLD VENIPUNCTURE: CPT | Performed by: INTERNAL MEDICINE

## 2019-02-28 PROCEDURE — 99233 SBSQ HOSP IP/OBS HIGH 50: CPT | Performed by: INTERNAL MEDICINE

## 2019-02-28 PROCEDURE — G0463 HOSPITAL OUTPT CLINIC VISIT: HCPCS

## 2019-02-28 PROCEDURE — 25800030 ZZH RX IP 258 OP 636: Performed by: INTERNAL MEDICINE

## 2019-02-28 PROCEDURE — A9270 NON-COVERED ITEM OR SERVICE: HCPCS | Mod: GY | Performed by: INTERNAL MEDICINE

## 2019-02-28 PROCEDURE — 40000895 ZZH STATISTIC SLP IP EVAL DEFER

## 2019-02-28 PROCEDURE — 82565 ASSAY OF CREATININE: CPT | Performed by: INTERNAL MEDICINE

## 2019-02-28 PROCEDURE — 25000125 ZZHC RX 250: Performed by: INTERNAL MEDICINE

## 2019-02-28 PROCEDURE — 25000132 ZZH RX MED GY IP 250 OP 250 PS 637: Mod: GY | Performed by: INTERNAL MEDICINE

## 2019-02-28 PROCEDURE — 12000000 ZZH R&B MED SURG/OB

## 2019-02-28 PROCEDURE — 25000128 H RX IP 250 OP 636: Performed by: INTERNAL MEDICINE

## 2019-02-28 PROCEDURE — 80048 BASIC METABOLIC PNL TOTAL CA: CPT | Performed by: INTERNAL MEDICINE

## 2019-02-28 PROCEDURE — 27210429 ZZH NUTRITION PRODUCT INTERMEDIATE LITER

## 2019-02-28 PROCEDURE — 85027 COMPLETE CBC AUTOMATED: CPT | Performed by: INTERNAL MEDICINE

## 2019-02-28 PROCEDURE — 83605 ASSAY OF LACTIC ACID: CPT | Performed by: INTERNAL MEDICINE

## 2019-02-28 PROCEDURE — 40000886 ZZH STATISTIC STEP DOWN HRS DAY

## 2019-02-28 RX ORDER — VANCOMYCIN HYDROCHLORIDE 1 G/200ML
1000 INJECTION, SOLUTION INTRAVENOUS EVERY 12 HOURS
Status: DISCONTINUED | OUTPATIENT
Start: 2019-02-28 | End: 2019-03-01

## 2019-02-28 RX ORDER — GLYCOPYRROLATE 1 MG/1
1-2 TABLET ORAL 2 TIMES DAILY PRN
Status: DISCONTINUED | OUTPATIENT
Start: 2019-02-28 | End: 2019-03-04 | Stop reason: HOSPADM

## 2019-02-28 RX ORDER — VANCOMYCIN HYDROCHLORIDE 1 G/200ML
1000 INJECTION, SOLUTION INTRAVENOUS EVERY 12 HOURS
Status: DISCONTINUED | OUTPATIENT
Start: 2019-03-01 | End: 2019-02-28

## 2019-02-28 RX ADMIN — PIPERACILLIN SODIUM,TAZOBACTAM SODIUM 4.5 G: 4; .5 INJECTION, POWDER, FOR SOLUTION INTRAVENOUS at 16:12

## 2019-02-28 RX ADMIN — SODIUM CHLORIDE, POTASSIUM CHLORIDE, SODIUM LACTATE AND CALCIUM CHLORIDE 100 ML/HR: 600; 310; 30; 20 INJECTION, SOLUTION INTRAVENOUS at 02:16

## 2019-02-28 RX ADMIN — CARBAMAZEPINE 150 MG: 100 SUSPENSION ORAL at 01:20

## 2019-02-28 RX ADMIN — PIPERACILLIN SODIUM,TAZOBACTAM SODIUM 4.5 G: 4; .5 INJECTION, POWDER, FOR SOLUTION INTRAVENOUS at 21:55

## 2019-02-28 RX ADMIN — LEVOTHYROXINE SODIUM 137 MCG: 112 TABLET ORAL at 06:11

## 2019-02-28 RX ADMIN — HYDROCORTISONE 30 MG: 20 TABLET ORAL at 16:10

## 2019-02-28 RX ADMIN — POTASSIUM & SODIUM PHOSPHATES POWDER PACK 280-160-250 MG 1 PACKET: 280-160-250 PACK at 08:49

## 2019-02-28 RX ADMIN — SODIUM BICARBONATE 20 MG: 84 INJECTION, SOLUTION INTRAVENOUS at 08:50

## 2019-02-28 RX ADMIN — PIPERACILLIN SODIUM,TAZOBACTAM SODIUM 4.5 G: 4; .5 INJECTION, POWDER, FOR SOLUTION INTRAVENOUS at 09:01

## 2019-02-28 RX ADMIN — POTASSIUM & SODIUM PHOSPHATES POWDER PACK 280-160-250 MG 1 PACKET: 280-160-250 PACK at 16:11

## 2019-02-28 RX ADMIN — VANCOMYCIN HYDROCHLORIDE 1000 MG: 1 INJECTION, SOLUTION INTRAVENOUS at 13:42

## 2019-02-28 RX ADMIN — BRIVARACETAM 100 MG: 10 SOLUTION ORAL at 09:41

## 2019-02-28 RX ADMIN — PIPERACILLIN SODIUM,TAZOBACTAM SODIUM 4.5 G: 4; .5 INJECTION, POWDER, FOR SOLUTION INTRAVENOUS at 04:20

## 2019-02-28 RX ADMIN — SODIUM BICARBONATE 20 MG: 84 INJECTION, SOLUTION INTRAVENOUS at 20:53

## 2019-02-28 RX ADMIN — CARBAMAZEPINE 150 MG: 100 SUSPENSION ORAL at 17:52

## 2019-02-28 RX ADMIN — ENOXAPARIN SODIUM 40 MG: 40 INJECTION SUBCUTANEOUS at 08:49

## 2019-02-28 RX ADMIN — SODIUM CHLORIDE, POTASSIUM CHLORIDE, SODIUM LACTATE AND CALCIUM CHLORIDE 100 ML/HR: 600; 310; 30; 20 INJECTION, SOLUTION INTRAVENOUS at 14:58

## 2019-02-28 RX ADMIN — CARBAMAZEPINE 150 MG: 100 SUSPENSION ORAL at 13:42

## 2019-02-28 RX ADMIN — BRIVARACETAM 100 MG: 10 SOLUTION ORAL at 21:56

## 2019-02-28 RX ADMIN — CARBAMAZEPINE 150 MG: 100 SUSPENSION ORAL at 06:10

## 2019-02-28 RX ADMIN — HYDROCORTISONE 60 MG: 20 TABLET ORAL at 08:50

## 2019-02-28 RX ADMIN — POTASSIUM & SODIUM PHOSPHATES POWDER PACK 280-160-250 MG 1 PACKET: 280-160-250 PACK at 21:56

## 2019-02-28 ASSESSMENT — ACTIVITIES OF DAILY LIVING (ADL)
SWALLOWING: 2-->DIFFICULTY SWALLOWING LIQUIDS/FOODS
DRESS: 4-->COMPLETELY DEPENDENT
RETIRED_COMMUNICATION: 3-->UNABLE TO UNDERSTAND OR SPEAK (NOT RELATED TO LANGUAGE BARRIER)
BATHING: 4-->COMPLETELY DEPENDENT
TOILETING: 4-->COMPLETELY DEPENDENT
COGNITION: 0 - NO COGNITION ISSUES REPORTED
RETIRED_EATING: 4-->COMPLETELY DEPENDENT
TRANSFERRING: 4-->COMPLETELY DEPENDENT
NUMBER_OF_TIMES_PATIENT_HAS_FALLEN_WITHIN_LAST_SIX_MONTHS: 1
FALL_HISTORY_WITHIN_LAST_SIX_MONTHS: YES
AMBULATION: 4-->COMPLETELY DEPENDENT

## 2019-02-28 NOTE — PROGRESS NOTES
[facility] Nurse Inpatient Wound Assessment     Initial Assessment of wound(s) on pt's:   Coccyx/buttocks, penis        Data:   Patient History:      per MD note(s): Keyon Farias is a 58 year-old male with a history of TBI with subsequent hemiplegia, panhypopituitarism and feeding tube in place complicated by recurrent aspiration pneumonia who was just discharged from the hospital on 2/22/19 for it who presented to the ED on 2/27/19 with a fever and CXR concerning for recurrence of his aspiration pneumonia.       Ricci Risk Assessment  Sensory Perception: 2-->very limited    Moisture: 2-->very moist   Activity: 1-->bedfast     Mobility: 2-->very limited   Nutrition: 3-->adequate   Ricci Score: 11      Positioning: Pillows    Mattress:  Standard , Atmos Air mattress    Moisture Management:  Incontinence Protocol and Diaper    Catheter secured? Not applicable    Current Diet / Nutrition:       Orders Placed This Encounter        NPO for Medical/Clinical Reasons Except for: NPO but receiving Tube Feeding        Labs:   Recent Labs   Lab Test 02/28/19  0532 02/27/19  2048  01/31/19  1324  11/22/18  1438  02/07/17  0452   ALBUMIN  --  3.3*   < > 2.9*   < >  --    < >  --    HGB 11.0* 13.1*   < > 12.0*   < >  --    < > 9.4*   INR  --   --   --   --   --   --   --  1.27*   WBC 20.4* 16.0*   < > 13.6*   < >  --    < > 11.7*   A1C  --   --   --   --   --  6.3*   < >  --    CRP  --   --   --  71.1*  --   --   --   --     < > = values in this interval not displayed.     Skin/wound Assessment (location):   Coccyx/buttocks  Wound History:  Pt well-known to [facility] service from previous admissions.  Pt with frequent urinary incontinence and has often presented with raw rashy skin to backside.  Pt lies well on his side and can help with rolling but needs prompting and assist, and often wiggles back onto his back.      Skin to upper buttocks/ coccyx appears very reddened but is blanchable and intact, very mild  maceration.  No obvious fungal rash.      2-28-19 [facility] photo - coccyx/buttocks          Penis (inferior distal shaft):  Pt was apparently using condom cath previously, which caused small ulceration.  There is a pink linear horizontal superficial wound approx 2.5cm long and 0.1-0.2cm wide, 0.1cm depth.  Nursing reports small bleeding earlier which has stopped.  No active drainage at assessment, no erythema.      2-28-19 [facility] photo - penis              Intervention:     Patient's chart evaluated.      Wound(s) assessed    Wound Care: completed by nursing    Orders  Written    Supplies  reviewed    Discussed plan of care with Patient and Nurse          Assessment:          Coccyx/buttocks reddened but intact and blanchable - no active pressure injury.  Pt very frequently moist so would not recommend dressings; would focus on frequent repositioning and keeping area clean and dry.         Penis - pressure vs minor trauma/laceration from condom cath, superficial, present on admission.  No special cares needed other than keep clean and dry and and free from pressure.          Plan:     Nursing to notify the Provider(s) and re-consult the [facility] Nurse if wound(s) deteriorate(s) or if the wound care plan needs reevaluation.      Skin care to coccyx/buttocks:  BID and prn with incontinence:  1.  Cleanse with mild skin cleanser, dry well.  2.  Apply light layer of baby powder, brush off excess.  3.  Apply thin glaze of barrier paste to perineal area.    4.  Leave open to air; NO dressings due to frequent incontinence.   5.  PIP measures.  Reposition every 1-2 hrs, side to side only.  HOB as low as tolerated.  Consider Pulsate if extended stay expected or any deterioration to skin noted.      Skin care to penis:  BID and prn:  Cleanse with mild skin cleanser.  Leave open to air.      [facility] Nurse will return: weekly and prn

## 2019-02-28 NOTE — PLAN OF CARE
Pt transferred from ED, IMC status. Total care, turn and repo q2h. On contact precautions. VSS on RA. Tele SR. Denies any pain. Incontinent of urine and stool. Alert, able to answer yes or no questions. G-tube in place, NPO. Coccyx red and excoriated, mepilex applied. Pt has a penile wound, from a condom cath per previous admission charting - protective skin barrier applied. Pt desats when sleeping, applied 2L O2. LR infusing at 100 mL/hr. Pt gets zosyn and vanco. Will continue to monitor.

## 2019-02-28 NOTE — ED NOTES
"[facility]  ED Nurse Handoff Report    ED Chief complaint: Fever (pt has fever. was in the hospital for 7 days with the same)      ED Diagnosis:   Final diagnoses:   Sepsis, due to unspecified organism (H)   Aspiration pneumonia of right lower lobe, unspecified aspiration pneumonia type (H)       Code Status: Full Code    Allergies:   Allergies   Allergen Reactions     Dilantin [Phenytoin Sodium]      Valproic Acid      Toxicity c bone marrow suspension, elevated ammonia levels        Activity level - Baseline/Home:  Total Care    Activity Level - Current:   Total Care     Needed?: No    Isolation: Yes  Infection:   MRSA  VRE  Bariatric?: No    Vital Signs:   Vitals:    02/27/19 2140 02/27/19 2200 02/27/19 2208 02/27/19 2215   BP: 99/60 114/57     Pulse: 118 117     Resp: 28 29 25 27   Temp:       TempSrc:       SpO2: 93% 97% 94% 93%       Cardiac Rhythm: ,   Cardiac  Cardiac Rhythm: Sinus tachycardia    Pain level:      Is this patient confused?: No   Does this patient have a guardian?  Yes         If yes, is there guardianship documents in the Epic \"Code/ACP\" activity?  Yes         Guardian Notified?  Yes  [place] - Suicide Severity Rating Scale Completed?  Yes  If yes, what color did the patient score?  White    Patient Report: Initial Complaint: Fever (pt has fever. was in the hospital for 7 days with the same. Pt had pneumonia and was in the hospital. Pt cont. To cough and have fever. Pt able to say yes or no, otherwise is nonverbal. Pt has G-tube and is incontinent of urine and stool.  Focused Assessment: cough, fever  Tests Performed: lab, influenza, ua, cxr, blood culture x2  Abnormal Results:   Results for orders placed or performed during the hospital encounter of 02/27/19   XR Chest 2 Views    Narrative    CHEST TWO VIEWS 2/27/2019 9:29 PM     HISTORY: Fever, recent pneumonia    COMPARISON: February 20, 2019     FINDINGS: Elevated right hemidiaphragm. Stable to slight increase " in  minimal right base infiltrate. The cardiac silhouette is stable.  Pulmonary vasculature is unremarkable.      Impression    IMPRESSION: Stable to perhaps slight increase in right base  infiltrate, elevated right hemidiaphragm.   CBC with platelets differential   Result Value Ref Range    WBC 16.0 (H) 4.0 - 11.0 10e9/L    RBC Count 4.69 4.4 - 5.9 10e12/L    Hemoglobin 13.1 (L) 13.3 - 17.7 g/dL    Hematocrit 38.8 (L) 40.0 - 53.0 %    MCV 83 78 - 100 fl    MCH 27.9 26.5 - 33.0 pg    MCHC 33.8 31.5 - 36.5 g/dL    RDW 14.1 10.0 - 15.0 %    Platelet Count 251 150 - 450 10e9/L    Diff Method Automated Method     % Neutrophils 76.3 %    % Lymphocytes 15.7 %    % Monocytes 5.1 %    % Eosinophils 2.3 %    % Basophils 0.2 %    % Immature Granulocytes 0.4 %    Nucleated RBCs 0 0 /100    Absolute Neutrophil 12.2 (H) 1.6 - 8.3 10e9/L    Absolute Lymphocytes 2.5 0.8 - 5.3 10e9/L    Absolute Monocytes 0.8 0.0 - 1.3 10e9/L    Absolute Eosinophils 0.4 0.0 - 0.7 10e9/L    Absolute Basophils 0.0 0.0 - 0.2 10e9/L    Abs Immature Granulocytes 0.1 0 - 0.4 10e9/L    Absolute Nucleated RBC 0.0    Lactic acid   Result Value Ref Range    Lactic Acid 4.2 (HH) 0.7 - 2.0 mmol/L   Comprehensive metabolic panel   Result Value Ref Range    Sodium 132 (L) 133 - 144 mmol/L    Potassium 4.5 3.4 - 5.3 mmol/L    Chloride 96 94 - 109 mmol/L    Carbon Dioxide 26 20 - 32 mmol/L    Anion Gap 10 3 - 14 mmol/L    Glucose 91 70 - 99 mg/dL    Urea Nitrogen 16 7 - 30 mg/dL    Creatinine 0.69 0.66 - 1.25 mg/dL    GFR Estimate >90 >60 mL/min/[1.73_m2]    GFR Estimate If Black >90 >60 mL/min/[1.73_m2]    Calcium 8.2 (L) 8.5 - 10.1 mg/dL    Bilirubin Total 0.2 0.2 - 1.3 mg/dL    Albumin 3.3 (L) 3.4 - 5.0 g/dL    Protein Total 8.3 6.8 - 8.8 g/dL    Alkaline Phosphatase 99 40 - 150 U/L    ALT 28 0 - 70 U/L    AST 17 0 - 45 U/L   UA reflex to Microscopic   Result Value Ref Range    Color Urine Yellow     Appearance Urine Clear     Glucose Urine 150 (A) NEG^Negative  mg/dL    Bilirubin Urine Negative NEG^Negative    Ketones Urine Negative NEG^Negative mg/dL    Specific Gravity Urine 1.014 1.003 - 1.035    Blood Urine Negative NEG^Negative    pH Urine 7.5 (H) 5.0 - 7.0 pH    Protein Albumin Urine Negative NEG^Negative mg/dL    Urobilinogen mg/dL Normal 0.0 - 2.0 mg/dL    Nitrite Urine Negative NEG^Negative    Leukocyte Esterase Urine Negative NEG^Negative    Source Catheterized Urine    Influenza A/B antigen   Result Value Ref Range    Influenza A/B Agn Specimen Nasal     Influenza A Negative NEG^Negative    Influenza B Negative NEG^Negative     Treatments provided: zosyn, vancomycin, tylenol, normal saline bolus 2500ml, hydrocortisone    Family Comments: pt mother here    OBS brochure/video discussed/provided to patient/family: N/A              Name of person given brochure if not patient:               Relationship to patient:     ED Medications:   Medications   vancomycin (VANCOCIN) 1,750 mg in sodium chloride 0.9 % 500 mL intermittent infusion (1,750 mg Intravenous New Bag 2/27/19 2212)   0.9% sodium chloride BOLUS (not administered)   0.9% sodium chloride BOLUS (1,000 mLs Intravenous New Bag 2/27/19 2052)   hydrocortisone sodium succinate PF (solu-CORTEF) injection 100 mg (100 mg Intravenous Given 2/27/19 2134)   0.9% sodium chloride BOLUS (0 mLs Intravenous Stopped 2/27/19 2214)   piperacillin-tazobactam (ZOSYN) 4.5 g vial to attach to  mL bag (0 g Intravenous Stopped 2/27/19 2216)   acetaminophen (TYLENOL) solution 650 mg (650 mg Oral Given 2/27/19 2146)       Drips infusing?:  No    For the majority of the shift this patient was Green.   Interventions performed were .    Severe Sepsis OR Septic Shock Diagnosis Present:   Yes    Per the ED Provider, Time Zero for severe sepsis or septic shock is:      3 Hour Severe Sepsis Bundle Completion:  1. Initial Lactic Acid Result:   Recent Labs   Lab Test 02/27/19 2048 02/20/19  0220 01/31/19  1324   LACT 4.2* 1.7 1.7     2.  Blood Cultures before Antibiotics: Yes  3. Broad Spectrum Antibiotics Administered:     Anti-infectives (From now, onward)    Start     Dose/Rate Route Frequency Ordered Stop    02/27/19 2105  vancomycin (VANCOCIN) 1,750 mg in sodium chloride 0.9 % 500 mL intermittent infusion      1,750 mg  over 2 Hours Intravenous ONCE 02/27/19 2104          4. 2000 ml of IV fluids have been given so far  Pt needs 500ml yet    6 Hour Severe Sepsis Bundle Completion:    1. Repeat Lactic Acid Level: Not drawn  2. Patient currently on Vasopressors =  No    To be done/followed up on inpatient unit:  needs 500ml normal saline yet    ED NURSE PHONE NUMBER: [phone]

## 2019-02-28 NOTE — CONSULTS
ADDENDUM:  MD consult received for TF orders.    Entered below TF into Epic ---> Isosource 1.5 @ 100 mL/hr x 12 hrs (7am-7pm).  Water flushes of 60 mL every 4 hrs.    CLINICAL NUTRITION SERVICES  -  ASSESSMENT NOTE      Future/Additional Recommendations:     TF resumption per MD  Would recommend regimen similar to home schedule ---> Isosource 1.5 at 100 mL/hr x 12 hours (7 am - 7 pm) to provide 1800 kcal (23 kcal/kg), 82g protein (1.05 kcal/kg), 211g CHO, 18g fiber and 912mL water.   Std H2O flushes of 60 mL q 4 hour while on IVF     Malnutrition:   % Weight Loss:  None noted  % Intake:  No decreased intake noted  Subcutaneous Fat Loss:  None observed  Muscle Loss:  None observed  Fluid Retention:  None noted    Malnutrition Diagnosis: Patient does not meet two of the above criteria necessary for diagnosing malnutrition         REASON FOR ASSESSMENT  Keyon Farias is a 58 year old male seen by Registered Dietitian for Admission Nutrition Risk Screen for tube feeding or parenteral nutrition      NUTRITION HISTORY  Chart reviewed  Pt is well known to our service  Has been on GJ tube feedings since 8/2016  RD assessment last week (2/20):  His home TF regimen is as follows - Jevity 1.5 (5 to 5.5 cans daily) x 12 hours during the day to provide 7590-0202 kcal, 77-83g protein, ~260g CHO, ~27g fiber and ~900mL free water.   H2O flushes 120 mL QID.  Pt lives with his mother, she likes to keep the TF rate at no more than 100 ml/hr.   He is fed during the day rather than at night so that he can be up in the chair to prevent aspiration.     Home meds: 1000 units VitD, multivitamin (Centrum Silver), Benefiber, Neutra-Phos, Florastor    2/4/19: A new 18 German Jovani type gastrojejunostomy tube was advanced over the wire into the Jejunum. (prev FT broke)      CURRENT NUTRITION ORDERS  Diet Order:     NPO    SLP eval pending      PHYSICAL FINDINGS  Observed  No nutrition-related physical findings observed  Obtained from  "Chart/Interdisciplinary Team  Incontinent of urine and stool.   Alert, able to answer yes or no questions.   Coccyx red and excoriated, mepilex applied.   Total cares        ANTHROPOMETRICS  Height: 6'0\"  Weight:(2/28) 77.9 kg / 171 lbs 11.81 oz (bed scale)  Body mass index is 23.29 kg/m .  Weight Status:  Normal BMI  IBW: 81 kg  % IBW: 96%  Weight History: ?? Wt up 7# past week  Wt Readings from Last 10 Encounters:   02/28/19 77.9 kg (171 lb 11.8 oz)   02/22/19 74.4 kg (164 lb 0.4 oz)   02/01/19 71.5 kg (157 lb 11.2 oz)   01/16/19 71.6 kg (157 lb 13.6 oz)   12/24/18 62 kg (136 lb 11 oz)   11/25/18 71.7 kg (158 lb 1.1 oz)   11/04/18 71.5 kg (157 lb 10.1 oz)   09/22/18 70.3 kg (155 lb)   09/21/18 78 kg (171 lb 15.3 oz)   09/03/18 72.3 kg (159 lb 6.3 oz)         LABS  Labs reviewed    MEDICATIONS  IVF (LR) at 100 mL/hr      ASSESSED NUTRITION NEEDS PER APPROVED PRACTICE GUIDELINES:    Dosing Weight (2/28) 77.9 kg  Estimated Energy Needs: 2040-4792 kcals (25-30 Kcal/Kg)  Justification: maintenance  Estimated Protein Needs:  grams protein (1.2-1.5 g pro/Kg)  Justification: preservation of lean body mass  Estimated Fluid Needs: 5421-8488  mL (1 mL/Kcal)  Justification: maintenance    MALNUTRITION:  % Weight Loss:  None noted  % Intake:  No decreased intake noted  Subcutaneous Fat Loss:  None observed  Muscle Loss:  None observed  Fluid Retention:  None noted    Malnutrition Diagnosis: Patient does not meet two of the above criteria necessary for diagnosing malnutrition      NUTRITION DIAGNOSIS:  Inadequate protein-energy intake related to NPO status, TF not running as evidenced by pt meeting 0% est needs      NUTRITION INTERVENTIONS  Recommendations / Nutrition Prescription  NPO  TF resumption per MD  Would recommend regimen similar to home schedule ---> Isosource 1.5 at 100 mL/hr x 12 hours (7 am - 7 pm) to provide 1800 kcal (23 kcal/kg), 82g protein (1.05 kcal/kg), 211g CHO, 18g fiber and 912mL water.   Std H2O " flushes of 60 mL q 4 hour while on IVF  .      Implementation  Nutrition education: No education needs assessed at this time  .      Nutrition Goals  Pt to receive nutrition in the next 24-48 hrs  .      MONITORING AND EVALUATION:  Progress towards goals will be monitored and evaluated per protocol and Practice Guidelines

## 2019-02-28 NOTE — H&P
[facility]    History and Physical - Hospitalist Service       Date of Admission:  2/27/2019    Assessment & Plan   Keyon Farias is a 58 year old male with a history of TBI with subsequent hemiplegia, panhypopituitarism and feeding tube in place complicated by recurrent aspiration pneumonia and was just discharged from the hospital on 2/22/19 for it who presented to the ED on 2/7/19 with a fever and CXR concerning for recurrence of his aspiration pneumonia     Recurrent aspiration pneumonia  Sepsis due to above  Just discharged from the hospital on 2/22/19 with Augmentin for recurrent aspiration pneumonia.  Today began to have increasing temperatures so mother called EMS and brought him in.  Here the patient was noted to have a fever of 102.4 degrees and a WBC of 16.0.  CXR here also showed increased opacity at the right lung base.  Lactate was elevated at 4.2 and patient was given IVF as well as Zosyn and Vancomycin.    - Admission to medical bed under IMC status  - Follow cultures  - Continue IV Zosyn and Vancomycin   - NPO at this time  - Speech evaluation placed     H/o TBI  Hemiplegia   Panhypopituitarism   Seizure disorder   Stable at this time.  No signs of adrenal insufficiency at this time.  - PTA Brivaracetam, Tegretol  - PTA Synthroid 137 mcg  - Stress dose steroids ordered.  Normally on Cortef 20 mg QAM and 10 mg w/supper.  Dose tripled for 3 days          Diet: NPO currently   DVT Prophylaxis: Enoxaparin (Lovenox) SQ  Lerma Catheter: not present  Code Status: Full code    Disposition Plan   Expected discharge: 2 - 3 days, recommended to prior living arrangement once infection improved.  Entered: Yaron Lujan DO 02/27/2019, 10:55 PM     The patient's care was discussed with the Patient and Patient's Family.    Yaron Lujan DO  [facility]    ______________________________________________________________________    Chief Complaint   Fever    Patient's history  is limited due to his previous TBI.  History was obtained from his mother who is his guardian     History of Present Illness   Keyon Farias is a 58 year old male ith a history of TBI with subsequent hemiplegia, panhypopituitarism and feeding tube in place complicated by recurrent aspiration pneumonia and was just discharged from the hospital on 2/22/19 for it who presented to the ED on 2/7/19 with a fever.  Mother reports this evening that the patient began to have increasing temperatures and by the time it was almost a 100 she called EMS to bring him in.  Upon arrival to the ED the patient was noted to be febrile at 102.4 degrees.  Mother has also noticed a continued wet sounding cough since discharge and 1-2 episodes of vomiting today.  No other abnormalities per mother.  Patient does not appear to be in pain or uncomfortable.      In the ED CXR showed slightly worsening opacity in the right lower lobe.  He was given IVF and Vanc/Zosyn.  Admitted for further evaluation     Review of Systems    Unable to obtain adequate ROS due to the patient's baseline mental status    Past Medical History    I have reviewed this patient's medical history and updated it with pertinent information if needed.   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      Pneumonia      Seizures (H)     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     Septic shock (H)      Spastic hemiplegia affecting dominant side (H)     related to wil injury     Thyroid disease      Tracheostomy care (H)      Traumatic brain injury (H) 1989     Unspecified cerebral artery occlusion with cerebral infarction 1989     UTI  (urinary tract infection)      Ventricular fibrillation (H)      Ventricular tachyarrhythmia (H)        Past Surgical History   I have reviewed this patient's surgical history and updated it with pertinent information if needed.  Past Surgical History:   Procedure Laterality Date     ENDOSCOPIC ULTRASOUND UPPER GASTROINTESTINAL TRACT (GI) N/A 1/30/2017    Procedure: ENDOSCOPIC ULTRASOUND, ESOPHAGOSCOPY / UPPER GASTROINTESTINAL TRACT (GI);  Surgeon: Jus Montana MD;  Location: [facility] OR     ENDOSCOPIC ULTRASOUND, ESOPHAGOSCOPY, GASTROSCOPY, DUODENOSCOPY (EGD), NECROSECTOMY N/A 2/7/2017    Procedure: ENDOSCOPIC ULTRASOUND, ESOPHAGOSCOPY, GASTROSCOPY, DUODENOSCOPY (EGD), NECROSECTOMY;  Surgeon: Jack Marcus MD;  Location:  OR     ESOPHAGOSCOPY, GASTROSCOPY, DUODENOSCOPY (EGD), COMBINED  3/13/2014    Procedure: COMBINED ESOPHAGOSCOPY, GASTROSCOPY, DUODENOSCOPY (EGD), BIOPSY SINGLE OR MULTIPLE;  gastroscopy;  Surgeon: Digna Rhodes MD;  Location: [facility]     ESOPHAGOSCOPY, GASTROSCOPY, DUODENOSCOPY (EGD), COMBINED N/A 12/6/2016    Procedure: COMBINED ESOPHAGOSCOPY, GASTROSCOPY, DUODENOSCOPY (EGD);  Surgeon: Digna Rhodes MD;  Location: [facility]     ESOPHAGOSCOPY, GASTROSCOPY, DUODENOSCOPY (EGD), COMBINED N/A 2/7/2017    Procedure: COMBINED ENDOSCOPIC ULTRASOUND, ESOPHAGOSCOPY, GASTROSCOPY, DUODENOSCOPY (EGD), FINE NEEDLE ASPIRATE/BIOPSY;  Surgeon: Too Thakur MD;  Location:  OR     HEAD & NECK SURGERY      reconstructive facial surgery following accident in 1989     IR FOLLOW UP VISIT INPATIENT  2/20/2019     IR GASTRO JEJUNOSTOMY TUBE CHANGE  12/20/2018     IR GASTRO JEJUNOSTOMY TUBE CHANGE  2/4/2019     LAPAROSCOPIC APPENDECTOMY  7/30/2013    Procedure: LAPAROSCOPIC APPENDECTOMY;  LAPAROSCOPIC APPENDECTOMY;  Surgeon: Manish Pierce MD;  Location:  OR     LAPAROSCOPIC ASSISTED INSERTION TUBE GASTROTOMY N/A 9/7/2016    Procedure: LAPAROSCOPIC ASSISTED INSERTION TUBE  GASTROSTOMY;  Surgeon: Manish Pierce MD;  Location:  OR     ORTHOPEDIC SURGERY      right hand repair     TRACHEOSTOMY N/A 9/3/2016    Procedure: TRACHEOSTOMY;  Surgeon: João Ortiz MD;  Location:  OR     TRACHEOSTOMY N/A 2016    Procedure: TRACHEOSTOMY;  Surgeon: João Ortiz MD;  Location:  OR     VASCULAR SURGERY         Social History   I have reviewed this patient's social history and updated it with pertinent information if needed.  Social History     Tobacco Use     Smoking status: Former Smoker     Last attempt to quit: 1989     Years since quittin.8     Smokeless tobacco: Never Used   Substance Use Topics     Alcohol use: No     Drug use: No       Family History   I have reviewed this patient's family history and updated it with pertinent information if needed.   Mother denied any pertinent family history     Prior to Admission Medications   Prior to Admission Medications   Prescriptions Last Dose Informant Patient Reported? Taking?   Brivaracetam (BRIVIACT) 10 MG/ML solution 2019 at x2 Mother Yes Yes   Si mg by Oral or FT or NG tube route 2 times daily @0900 and 2100   Multiple Vitamins-Minerals (CENTRUM SILVER) per tablet 2019 at Unknown time Mother Yes Yes   Sig: Take 1 tablet by mouth daily Crush and feed via j-tube   Vitamin D, Cholecalciferol, 1000 units TABS 2019 at Unknown time Mother Yes Yes   Sig: Take 2,000 Units by mouth every morning Crush and feed via j-tube   Wheat Dextrin (BENEFIBER) POWD 2019 at Unknown time Mother Yes Yes   Si teaspoonful by Per NG tube route daily    acetaminophen (TYLENOL) 500 MG tablet prn Mother Yes Yes   Si,000 mg by Oral or FT or NG tube route every 6 hours as needed for mild pain   albuterol (2.5 MG/3ML) 0.083% neb solution prn Mother Yes Yes   Sig: Take 1 vial by nebulization every 4 hours as needed for shortness of breath / dyspnea or wheezing   amoxicillin-clavulanate (AUGMENTIN)  875-125 MG tablet 2019 at x2 Mother No Yes   Si tablet by Per Feeding Tube route every 12 hours for 7 days   bacitracin ointment  Mother Yes Yes   Sig: Apply topically daily To PEG site.    calcium carbonate 1250 (500 CA) MG/5ML SUSP suspension 2019 at x3 Mother No Yes   Si mLs (1,250 mg) by Per J Tube route 3 times daily (with meals)   Patient taking differently: by Per J Tube route 3 times daily (with meals)    carBAMazepine (TEGRETOL) 100 MG/5ML suspension 2019 at x2 Mother Yes Yes   Sig: Take 150 mg by mouth every 6 hours At 06:00, 12:00, 18:00 and 24:00 for seizures   hydrocortisone (CORTEF) 5 MG tablet 2019 at Unknown time Mother Yes Yes   Sig: 10 mg by Oral or FT or NG tube route daily (with dinner) At 1500   hydrocortisone (CORTEF) 5 MG tablet 2019 at Unknown time Mother Yes Yes   Si mg by Oral or FT or NG tube route every morning    levothyroxine (SYNTHROID/LEVOTHROID) 137 MCG tablet 2019 at Unknown time Mother Yes Yes   Si mcg by Oral or FT or NG tube route daily   melatonin (MELATONIN) 1 MG/ML LIQD liquid 2019 at Unknown time Mother Yes Yes   Si mg by Per NG tube route At Bedtime    pantoprazole (PROTONIX) 2 mg/mL SUSP suspension 2019 at x2 Mother Yes Yes   Si mg by Per NG tube route 2 times daily    potassium & sodium phosphates (NEUTRA-PHOS) 280-160-250 MG Packet 2019 at x3 Mother Yes Yes   Sig: Take 1 packet by mouth 3 times daily 0900, 1500, 2100.    saccharomyces boulardii (FLORASTOR) 250 MG capsule 2019 at Unknown time Mother Yes Yes   Si mg by Oral or Feeding Tube route daily   scopolamine (TRANSDERM-SCOP, 1.5 MG,) 1 MG/3DAYS 72 hr patch not on now Mother Yes Yes   Sig: Place 1 patch onto the skin every 72 hours   testosterone cypionate (DEPOTESTOTERONE CYPIONATE) 200 MG/ML injection 2/15/2019 Mother Yes Yes   Sig: Inject 76 mg into the muscle See Admin Instructions Every 2 weeks on    76 mg or 0.38 mL       Facility-Administered Medications: None     Allergies   Allergies   Allergen Reactions     Dilantin [Phenytoin Sodium]      Valproic Acid      Toxicity c bone marrow suspension, elevated ammonia levels        Physical Exam   Vital Signs: Temp: 102.4  F (39.1  C) Temp src: Rectal BP: 122/58 Pulse: 114 Heart Rate: 114 Resp: 16 SpO2: 94 % O2 Device: None (Room air)    Weight: 0 lbs 0 oz    General Appearance: Resting comfortably in bed.  NAD   Eyes: EOMI.  Normal conjunctiva  HEENT: Dry mucous membranes  Respiratory: Coarse breath sounds.  No respiratory distress on RA  Cardiovascular: Tachycardia.  No obvious murmurs  GI: G tube present.  Bowel sounds present  Skin: G tube site without any surrounding erythema.  No rashes  Musculoskeletal: No edema   Neurologic: No new deficits per mother  Psychiatric: Alert.  Can not assess orientation     Data   Data reviewed today: I reviewed all medications, new labs and imaging results over the last 24 hours. I personally reviewed   CXR:  Opacity noted at right lung base.  Elevated right hemidiaphragm.   No obvious pleural effusions     Recent Labs   Lab 02/27/19  2048 02/21/19  0800   WBC 16.0* 9.8   HGB 13.1* 10.9*   MCV 83 85    162   * 141   POTASSIUM 4.5 3.9   CHLORIDE 96 111*   CO2 26 26   BUN 16 12   CR 0.69 0.77   ANIONGAP 10 4   STEVE 8.2* 8.2*   GLC 91 193*   ALBUMIN 3.3*  --    PROTTOTAL 8.3  --    BILITOTAL 0.2  --    ALKPHOS 99  --    ALT 28  --    AST 17  --      Recent Results (from the past 24 hour(s))   XR Chest 2 Views    Narrative    CHEST TWO VIEWS 2/27/2019 9:29 PM     HISTORY: Fever, recent pneumonia    COMPARISON: February 20, 2019     FINDINGS: Elevated right hemidiaphragm. Stable to slight increase in  minimal right base infiltrate. The cardiac silhouette is stable.  Pulmonary vasculature is unremarkable.      Impression    IMPRESSION: Stable to perhaps slight increase in right base  infiltrate, elevated right hemidiaphragm.    BERT ESPINOZA  MD

## 2019-02-28 NOTE — PHARMACY-VANCOMYCIN DOSING SERVICE
Pharmacy Vancomycin Initial Note  Date of Service 2019  Patient's  1960  58 year old, male    Indication: [facility] Pneumonia    Current estimated CrCl = Estimated Creatinine Clearance: 125.8 mL/min (based on SCr of 0.69 mg/dL).    Creatinine for last 3 days  2019:  8:48 PM Creatinine 0.69 mg/dL    Recent Vancomycin Level(s) for last 3 days  No results found for requested labs within last 72 hours.      Vancomycin IV Administrations (past 72 hours)                   vancomycin (VANCOCIN) 1,750 mg in sodium chloride 0.9 % 500 mL intermittent infusion (mg) 1,750 mg New Bag 19 2212                Nephrotoxins and other renal medications (From now, onward)    Start     Dose/Rate Route Frequency Ordered Stop    19 1100  vancomycin (VANCOCIN) 1000 mg in dextrose 5% 200 mL PREMIX      1,000 mg  200 mL/hr over 1 Hours Intravenous EVERY 12 HOURS 19 0022      19 0400  piperacillin-tazobactam (ZOSYN) 4.5 g vial to attach to  mL bag      4.5 g  over 30 Minutes Intravenous EVERY 6 HOURS 19 2345            Contrast Orders - past 72 hours (72h ago, onward)    None                Plan:  1.  Start vancomycin  1000 mg IV q12h.   2.  Goal Trough Level: 15-20 mg/L   3.  Pharmacy will check trough levels as appropriate in 1-3 Days.    4. Serum creatinine levels will be ordered daily for the first week of therapy and at least twice weekly for subsequent weeks.    5. [place] method utilized to dose vancomycin therapy: Method 1    Akil Aliheyder

## 2019-02-28 NOTE — PHARMACY-ADMISSION MEDICATION HISTORY
Admission medication history interview status for the 2/27/2019  admission is complete. See EPIC admission navigator for prior to admission medications     Medication history source reliability:Good    Actions taken by pharmacist (provider contacted, etc):reviewed discharge note from 2/22     Additional medication history information not noted on PTA med list :None    Medication reconciliation/reorder completed by provider prior to medication history? No    Time spent in this activity: 15 min    Prior to Admission medications    Medication Sig Last Dose Taking? Auth Provider   acetaminophen (TYLENOL) 500 MG tablet 1,000 mg by Oral or FT or NG tube route every 6 hours as needed for mild pain prn Yes Unknown, Entered By History   albuterol (2.5 MG/3ML) 0.083% neb solution Take 1 vial by nebulization every 4 hours as needed for shortness of breath / dyspnea or wheezing prn Yes Unknown, Entered By History   amoxicillin-clavulanate (AUGMENTIN) 875-125 MG tablet 1 tablet by Per Feeding Tube route every 12 hours for 7 days 2/27/2019 at x2 Yes Mark Daly MD   bacitracin ointment Apply topically daily To PEG site.   Yes Unknown, Entered By History   Brivaracetam (BRIVIACT) 10 MG/ML solution 100 mg by Oral or FT or NG tube route 2 times daily @0900 and 2100 2/27/2019 at x2 Yes Reported, Patient   calcium carbonate 1250 (500 CA) MG/5ML SUSP suspension 5 mLs (1,250 mg) by Per J Tube route 3 times daily (with meals)  Patient taking differently: by Per J Tube route 3 times daily (with meals)  2/27/2019 at x3 Yes Mariana Venegas MD   carBAMazepine (TEGRETOL) 100 MG/5ML suspension Take 150 mg by mouth every 6 hours At 06:00, 12:00, 18:00 and 24:00 for seizures 2/27/2019 at x2 Yes Unknown, Entered By History   hydrocortisone (CORTEF) 5 MG tablet 10 mg by Oral or FT or NG tube route daily (with dinner) At 1500 2/27/2019 at Unknown time Yes Unknown, Entered By History   hydrocortisone (CORTEF) 5 MG tablet 20 mg by  Oral or FT or NG tube route every morning  2/27/2019 at Unknown time Yes Unknown, Entered By History   levothyroxine (SYNTHROID/LEVOTHROID) 137 MCG tablet 137 mcg by Oral or FT or NG tube route daily 2/27/2019 at Unknown time Yes Unknown, Entered By History   melatonin (MELATONIN) 1 MG/ML LIQD liquid 6 mg by Per NG tube route At Bedtime  2/26/2019 at Unknown time Yes Unknown, Entered By History   Multiple Vitamins-Minerals (CENTRUM SILVER) per tablet Take 1 tablet by mouth daily Crush and feed via j-tube 2/27/2019 at Unknown time Yes Unknown, Entered By History   pantoprazole (PROTONIX) 2 mg/mL SUSP suspension 20 mg by Per NG tube route 2 times daily  2/27/2019 at x2 Yes Unknown, Entered By History   potassium & sodium phosphates (NEUTRA-PHOS) 280-160-250 MG Packet Take 1 packet by mouth 3 times daily 0900, 1500, 2100.  2/27/2019 at x3 Yes Unknown, Entered By History   saccharomyces boulardii (FLORASTOR) 250 MG capsule 250 mg by Oral or Feeding Tube route daily 2/27/2019 at Unknown time Yes Unknown, Entered By History   scopolamine (TRANSDERM-SCOP, 1.5 MG,) 1 MG/3DAYS 72 hr patch Place 1 patch onto the skin every 72 hours not on now Yes Unknown, Entered By History   testosterone cypionate (DEPOTESTOTERONE CYPIONATE) 200 MG/ML injection Inject 76 mg into the muscle See Admin Instructions Every 2 weeks on Fridays   76 mg or 0.38 mL 2/15/2019 Yes Unknown, Entered By History   Vitamin D, Cholecalciferol, 1000 units TABS Take 2,000 Units by mouth every morning Crush and feed via j-tube 2/27/2019 at Unknown time Yes Unknown, Entered By History   Wheat Dextrin (BENEFIBER) POWD 2 teaspoonful by Per NG tube route daily  2/27/2019 at Unknown time Yes Unknown, Entered By History

## 2019-02-28 NOTE — PLAN OF CARE
Discharge Planner SLP   Patient plan for discharge: Did not discuss  Current status: Per chart review and conversation with RN, pt not appropriate for swallow evaluation. Pt is considered a high aspiration risk with any oral intake and is chronically NPO.     Based on most recent SLP evaluation (12/18/18) - Patient presents with severe oral and pharyngeal dysphagia at bedside. Oral motor examination revealed moderate lingual deficits for ROM/strength. No velar elevation noted and unable to impound air. Trial of honey thick liquids given with poor bolus control and tongue thrusting and unable to propel the bolus posteriorly. Incomplete swallow response with coughing noted. Recent video at another hospital revealed aspiration of honey thick liquids and pudding. Patient has been seen by this department on multiple admissions and swallow function continues to be severely impaired and do not anticipate any improvement. Recommend: 1. NPO and receive nutrition via the PEG tube. No further skilled intervention is indicated.     SLP will sign off.     Barriers to return to prior living situation: None per SLP  Recommendations for discharge: Defer to medical team   Rationale for recommendations: No further SLP services warranted as improvement is not anticipated. No skilled intervention warranted, pt at baseline          Entered by: Marina Hathaway 02/28/2019 1:41 PM

## 2019-02-28 NOTE — PHARMACY-CONSULT NOTE
Pharmacy Tube Feeding Consult    Medication reviewed for administration by feeding tube and for potential food/drug interactions.    Recommendation: No changes are needed at this time.  Prior to admission medication regimen administered via feeding tube       Pharmacy will continue to follow as new medications are ordered.      Adarsh AndersonD

## 2019-02-28 NOTE — ED PROVIDER NOTES
History     Chief Complaint:  Fever     HPI:   The history is provided by the patient and medical records.      Keyon Farias is a 58 year old male with a history of TBI with right-sided hemiparesis, seizure disorder, panhypopituitarism, and aphasia resulting in G tube dependence who presents to the emergency department via EMS for evaluation of a fever. The patient was recently admitted here from 2/20-2/22 for sepsis due to aspiration pneumonia. He has had multiple hospitalizations related to this. His fever had resolved at discharge and he was sent home with Augmentin. His cough never completely resolved, however. His fever has now returned so he presents to the emergency department for reevaluation. Here, the patient denies any vomiting, diarrhea, worsening shortness of breath, or new rashes.     CARDIAC RISK FACTORS:  Sex:    Male   Tobacco:   Positive (former )  Hypertension:   Negative   Hyperlipidemia:  Negative   Diabetes:   Negative     PE/DVT RISK FACTORS:  Sex:    Male   Hormones:   Positive   Tobacco:   Positive (former)  Cancer:   Negative   Travel:   Negative   Surgery:   Negative   Other immobilization: Positive (hospitalization)  Personal history:  Positive     Allergies:  Dilantin [Phenytoin Sodium]  Valproic Acid      Medications:    Albuterol neb   Augmentin   Briviact   Tegretol   Cortef   Levothyroxine   Protonix   Potassium and sodium phosphates   Florastor   Testosterone   Vitamin D     Past Medical History:    Aphasia dysuria to TBI   DVT  Cardiac arrest   Encephalopathy   Necrotizing pancreatitis   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  Panhypopituitarism   Pneumonia   Seizures   Sepsis   Spastic hemiplegia affecting dominant side   Hypothyroidism   Cerebral artery occlusion with cerebral infarction   Ventricular fibrillation     Past Surgical History:    Endoscopic US upper GI tract  Endoscopic US EGD  EGD x 3   Reconstructive facial surgery   Gastro  jejunostomy tube change x 2   Laparoscopic appendectomy   Laparoscopic assisted insertion tube gastrotomy   Tracheostomy x 2   Vascular surgery     Family History:    Noncontributory     Social History:  Presents via EMS   Tobacco use: Former smoker, quit 1989  Alcohol use: No   PCP: Carlos Gomez    Marital Status:  Single      Review of Systems   Constitutional: Positive for fever.   Respiratory: Positive for cough. Negative for shortness of breath.    Gastrointestinal: Negative for diarrhea and vomiting.   Skin: Negative for rash.   All other systems reviewed and are negative.      Physical Exam     Patient Vitals for the past 24 hrs:   BP Temp Temp src Pulse Heart Rate Resp SpO2   02/27/19 2320 124/52 -- -- 105 105 (!) 33 91 %   02/27/19 2240 122/58 -- -- 114 114 16 94 %   02/27/19 2220 121/55 -- -- 122 120 (!) 34 93 %   02/27/19 2215 -- -- -- -- 120 27 93 %   02/27/19 2208 -- -- -- -- 122 25 94 %   02/27/19 2200 114/57 -- -- 117 123 29 97 %   02/27/19 2140 99/60 -- -- 118 -- 28 93 %   02/27/19 2138 131/60 -- -- 125 -- -- --   02/27/19 2037 112/86 102.4  F (39.1  C) Rectal 128 -- 18 --        Physical Exam  Constitutional: Alert, attentive, able to answer yes/no questions, otherwise nonverbal  HENT:    Nose: Nose normal.    Mouth/Throat: Oropharynx is clear, mucous membranes are moist  Eyes: EOM are normal, anicteric, conjugate gaze  CV: tachycardic rate and regular rhythm; no murmurs  Chest: Harsh nonproductive cough. Cracks in the right lung base.   GI:  non tender. No distension. No guarding or rebound.    MSK: No LE edema, no tenderness to palpation of BLE.  Neurological: Alert, attentive, moving all extremities equally.   Skin: Skin is warm and dry. No pressure sores or ulcers.     Emergency Department Course   ECG (21:08:07):  Rate 119 bpm. RI interval 142. QRS duration 82. QT/QTc 322/452. P-R-T axes 48 -56 54. Soinus tachycardia. Left anterior fascicular block. Abnormal ECG. Agree with computer  interpretation. No significant changes from prior EKG on 18.  Interpreted at 2108 by Keyon Park MD.     Imaging:  Radiographic findings were communicated with the patient and family who voiced understanding of the findings.     XR Chest, 2 views:  IMPRESSION: Stable to perhaps slight increase in right base  infiltrate, elevated right hemidiaphragm.    Imaging independently reviewed and agree with radiologist interpretation.     Laboratory:  I personally reviewed the laboratory results with the Patient and mother and answered all related questions prior to admission.  CBC: WBC 16.0 (H), HGB 13.1 (L), ow WNL ()   CMP: Sodium 132 (L), Calcium 8.2 (L), Albumin 3.3 (L), ow WNL (Creatinine 0.69)   UA reflex to Microscopic: Glucose 150, pH 7.5 (H), ow Negative   Lactic acid:    Influenza A/B antigen: Negative    Blood Culture x2: Pending     Interventions:  : NS 1L IV Bolus    2100: NS 1L IV Bolus    2134: solu-cortef 100 mg IV  2137: Zosyn 4.5 g IV Infusion   2146: Tylenol 650 mg PO   2212: Vancocin 1750 mg in NaCl 0.9% 500 mL IV Infusion   2322:  mL IV Bolus       Emergency Department Course:  Past medical records, nursing notes, and vitals reviewed.  0853: I performed an exam of the patient and obtained history, as documented above.     IV inserted and blood drawn. This was sent to the lab for further testing, results above.   Above interventions provided.      The patient was sent for a XR while in the emergency department, findings above.        Findings and plan explained to the Patient and mother who consents to admission.     2236: Discussed the patient with Dr. Lujan, who will admit the patient to a medical bed for further monitoring, evaluation, and treatment.       Impression & Plan    Medical Decision Makin-year-old male with past medical history significant for severe TBI, spastic hemiplegia of the right side, recurrent seizures, panhypopituitarism who was discharged 5  days prior after admission for sepsis secondary to aspiration pneumonia with recurrent fever to 102  F despite taking his Augmentin.  On arrival, patient was noted to be tachycardic to the 120s, febrile to 102 with a blood pressure in the 110s over 80s, concerning for sepsis.  IV access was obtained, blood cultures sent, straight catheter urine was obtained and chest x-ray was performed in addition to 2500 cc fluid bolus (just over 30cc/kg) was initiated.  He was empirically given 100 mg IV hydrocortisone given his history of steroid-dependent hypopituitarism.  Broad-spectrum antibiotics, Vanco/Zosyn given.  Chest x-ray shows possible worsening right lower lobe infiltrate suggestive of likely worsening aspiration pneumonia is most likely etiology of his sepsis.  His lactate was elevated at 4.5, however patient's blood pressure remained stable and heart rate was improving with IV fluids.  Pressors deferred.  Patient was admitted to Dr. Lujan in intermediate care plan for close hemodynamic monitoring of his severe sepsis likely secondary to aspiration pneumonia.  Diagnosis:    ICD-10-CM    1. Severe sepsis (H) A41.9     R65.20    2. Aspiration pneumonia of right lower lobe, unspecified aspiration pneumonia type (H) J69.0        Disposition:  Admitted to Dr. Lujan for further evaluation and care.    Keyon Park MD   [employer]  1:25 AM 02/28/19     Scribe Disclosure:   Jake DYKES, am serving as a scribe at 8:53 PM on 2/27/2019 to document services personally performed by Keyon Park MD based on my observations and the provider's statements to me.       Keyon Park MD  2/27/2019    EMERGENCY DEPARTMENT       Keyon Park MD  02/28/19 0125

## 2019-02-28 NOTE — PROGRESS NOTES
[facility]    Medicine Progress Note - Hospitalist Service       Date of Admission:  2/27/2019  Assessment & Plan   Keyon Farias is a 58 year-old male with a history of TBI with subsequent hemiplegia, panhypopituitarism and feeding tube in place complicated by recurrent aspiration pneumonia who was just discharged from the hospital on 2/22/19 for it who presented to the ED on 2/27/19 with a fever and CXR concerning for recurrence of his aspiration pneumonia.     Recurrent aspiration pneumonia  Sepsis due to above  Just discharged from the hospital on 2/22/19 with Augmentin for recurrent aspiration pneumonia. Day of admission had began to have increasing temperatures so mother called EMS. Here the patient was noted to have a fever of 102.4 F and a WBC of 16.0. CXR here with stable to slightly increased opacity at the right lung base.  Lactate was elevated at 4.2, WBC elevated to 16.4.  Influenza A/B negative.  UA unremarkable.  Patient had episodes of emesis day of admission as well as chronic difficulties with secretion management.  At this point not clear that presentation represents new bacterial pneumonia versus chemical/secretion aspiration pneumonitis.  Note his mother has questioned if the reason for his presentation is because of inadequate treatment with his last pneumonia, given his improvement to baseline on therapy previously highly doubt that this is the case.  - Afebrile  - Trend WBC   - NPO except for tube feeds  - Speech evaluation placed. See goals of care planning.   - Continue IVF  - Continue piperacillin-tazobactam IV and vancomycin IV. Narrow regimen if cultures unrevealing.   - Blood cultures NGTD  - Transfer from [facility]    H/o TBI  Hemiplegia   Panhypopituitarism   Seizure disorder   Stable at this time.  No signs of adrenal insufficiency at this time.  - PTA Brivaracetam, Tegretol  - PTA Synthroid 137 mcg  - Continue stress dose steroids. Normally on Cortef 20 mg QAM and 10 mg  "w/supper. Dose tripled for 3 days     Advance care planning  Extended discussion had with mother via phone, given this is his third hospitalization for the same issue in 1 month.  Discussed that despite best efforts to prevent this, including multiple attempts at adjusting his secretion management by his primary care provider, and GJ tube feeds that he continues to have aspiration events leading to hospitalization and at times significant illness ([facility] admission). Discussed that even should he recover from this illness, it is a question of \"when\" not \"if\" he has his next event and is re-hospitalized.  Introduced role of palliative care.  After discussion she remained adamant that she is \"not going to give up on him and let him die.\" Discussed that at any time he could have an even more severe event, that could potentially result in an ICU admission, breathing tube or death. She stated \"well he's not there now.\" Of note she remains hopeful that his aspiration will be solved by returning to eating by mouth. Note that he has had multiple SLP evaluations, most recently 12/18/18 with the assessment at that time that he would have no further recovery.   - Reassess with SLP this admission to confirm previous assessments  - Continue to address goals of care with mother.  - Goals remain restorative at this time, full code  - Palliative care consult not placed as mother was not open to discuss further with them at this time    Coccyx and penile breakdown  - Wound RN consulted    Diet: NPO for Medical/Clinical Reasons Except for: Meds    DVT Prophylaxis: Enoxaparin (Lovenox) SQ  Lerma Catheter: not present  Code Status: Full Code      Disposition Plan   Expected discharge: 2 - 3 days, recommended to prior living arrangement once transitioned to oral antibiotics.  Entered: Feng Cook MD 02/28/2019, 9:33 AM       Time Spent on this Encounter   I spent 40 minutes on the unit/floor managing the care of Keyon Farias. Over 50% " of my time was spent counseling the patient and/or coordinating care regarding services listed in this note.      The patient's care was discussed with the Bedside Nurse, Care Coordinator/, Patient and Patient's Family.    Feng Cook MD  Hospitalist Service  [facility]    ______________________________________________________________________    Interval History   No acute events overnight. Patient able to nod head yes/no to questions. Denies shortness of breath or pain.     Data reviewed today: I reviewed all medications, new labs and imaging results over the last 24 hours. I personally reviewed no images or EKG's today.    Physical Exam   Vital Signs: Temp: 97.7  F (36.5  C) Temp src: Oral BP: 122/64 Pulse: 73 Heart Rate: 74 Resp: 15 SpO2: 98 % O2 Device: None (Room air) Oxygen Delivery: 2 LPM  Weight: 171 lbs 11.81 oz    Constitutional: NAD  Respiratory: Clear to auscultation bilaterally, good air movement bilaterally  Cardiovascular: RRR, no m/r/g. No peripheral edema.  GI: Soft, non-tender, non-distended. BS normoactive.   Skin/Integumen: Warm, dry  Neuro: Alert. Nods head yes/no to questions.     Data   Recent Labs   Lab 02/28/19  0532 02/27/19  2048   WBC 20.4* 16.0*   HGB 11.0* 13.1*   MCV 82 83    251    132*   POTASSIUM 4.8 4.5   CHLORIDE 109 96   CO2 27 26   BUN 13 16   CR 0.74 0.69   ANIONGAP 3 10   STEVE 7.9* 8.2*   * 91   ALBUMIN  --  3.3*   PROTTOTAL  --  8.3   BILITOTAL  --  0.2   ALKPHOS  --  99   ALT  --  28   AST  --  17       Recent Results (from the past 24 hour(s))   XR Chest 2 Views    Narrative    CHEST TWO VIEWS 2/27/2019 9:29 PM     HISTORY: Fever, recent pneumonia    COMPARISON: February 20, 2019     FINDINGS: Elevated right hemidiaphragm. Stable to slight increase in  minimal right base infiltrate. The cardiac silhouette is stable.  Pulmonary vasculature is unremarkable.      Impression    IMPRESSION: Stable to perhaps slight increase  in right base  infiltrate, elevated right hemidiaphragm.    BERT ESPINOZA MD     Medications     lactated ringers 100 mL/hr at 02/28/19 0605       Brivaracetam  100 mg Oral BID     carBAMazepine  150 mg Oral Q6H     enoxaparin  40 mg Subcutaneous Q24H     hydrocortisone  30 mg Oral Daily with supper     hydrocortisone  60 mg Oral QAM     levothyroxine  137 mcg Oral Daily     pantoprazole  20 mg Per NG tube BID     piperacillin-tazobactam  4.5 g Intravenous Q6H     potassium & sodium phosphates  1 packet Oral TID     sodium chloride (PF)  3 mL Intracatheter Q8H     [START ON 3/1/2019] vancomycin (VANCOCIN) IV  1,000 mg Intravenous Q12H

## 2019-02-28 NOTE — PROGRESS NOTES
[facility]  Patient is currently open to home care services with [facility]. The patient is currently receiving  Skilled Nursing, PT and Speech therapy services. [facility]  and team have been notified of patient admission. [facility] liaison will continue to follow patient during stay.  If appropriate provide orders to resume home care at time of discharge.

## 2019-02-28 NOTE — ED NOTES
Bed: ED09  Expected date: 2/27/19  Expected time: 8:19 PM  Means of arrival: Ambulance  Comments:  Kerri ADAN 58M fever

## 2019-02-28 NOTE — PLAN OF CARE
Pt IMC status was D/Duane. Total care, turn and repo q2h. On contact precautions educated mom. VSS on RA. Tele SR. Denies any pain. Incontinent of urine and stool. Alert, able to answer yes or no questions. G-tube in place restarted tube feedings at 100, NPO. Coccyx red and excoriated wound care saw. Pt has a penile wound, from a condom cath per previous admission charting - protective skin barrier applied. LR infusing at 100 mL/hr. Pt gets zosyn and vanco. Will continue to monitor.

## 2019-03-01 PROBLEM — Z71.89 ACP (ADVANCE CARE PLANNING): Chronic | Status: RESOLVED | Noted: 2018-07-06 | Resolved: 2019-03-01

## 2019-03-01 LAB
ANION GAP SERPL CALCULATED.3IONS-SCNC: 7 MMOL/L (ref 3–14)
BUN SERPL-MCNC: 12 MG/DL (ref 7–30)
CALCIUM SERPL-MCNC: 8.4 MG/DL (ref 8.5–10.1)
CHLORIDE SERPL-SCNC: 110 MMOL/L (ref 94–109)
CO2 SERPL-SCNC: 27 MMOL/L (ref 20–32)
CREAT SERPL-MCNC: 0.83 MG/DL (ref 0.66–1.25)
ERYTHROCYTE [DISTWIDTH] IN BLOOD BY AUTOMATED COUNT: 14.7 % (ref 10–15)
GFR SERPL CREATININE-BSD FRML MDRD: >90 ML/MIN/{1.73_M2}
GLUCOSE BLDC GLUCOMTR-MCNC: 130 MG/DL (ref 70–99)
GLUCOSE SERPL-MCNC: 84 MG/DL (ref 70–99)
HCT VFR BLD AUTO: 34.5 % (ref 40–53)
HGB BLD-MCNC: 11.3 G/DL (ref 13.3–17.7)
MCH RBC QN AUTO: 27.4 PG (ref 26.5–33)
MCHC RBC AUTO-ENTMCNC: 32.8 G/DL (ref 31.5–36.5)
MCV RBC AUTO: 84 FL (ref 78–100)
PLATELET # BLD AUTO: 218 10E9/L (ref 150–450)
POTASSIUM SERPL-SCNC: 3.6 MMOL/L (ref 3.4–5.3)
RBC # BLD AUTO: 4.13 10E12/L (ref 4.4–5.9)
SODIUM SERPL-SCNC: 144 MMOL/L (ref 133–144)
WBC # BLD AUTO: 11.8 10E9/L (ref 4–11)

## 2019-03-01 PROCEDURE — 36415 COLL VENOUS BLD VENIPUNCTURE: CPT | Performed by: INTERNAL MEDICINE

## 2019-03-01 PROCEDURE — 00000146 ZZHCL STATISTIC GLUCOSE BY METER IP

## 2019-03-01 PROCEDURE — 25000128 H RX IP 250 OP 636: Performed by: INTERNAL MEDICINE

## 2019-03-01 PROCEDURE — 85027 COMPLETE CBC AUTOMATED: CPT | Performed by: INTERNAL MEDICINE

## 2019-03-01 PROCEDURE — 25000125 ZZHC RX 250: Performed by: INTERNAL MEDICINE

## 2019-03-01 PROCEDURE — 25000132 ZZH RX MED GY IP 250 OP 250 PS 637: Mod: GY | Performed by: INTERNAL MEDICINE

## 2019-03-01 PROCEDURE — A9270 NON-COVERED ITEM OR SERVICE: HCPCS | Mod: GY | Performed by: INTERNAL MEDICINE

## 2019-03-01 PROCEDURE — 25800030 ZZH RX IP 258 OP 636: Performed by: INTERNAL MEDICINE

## 2019-03-01 PROCEDURE — 99232 SBSQ HOSP IP/OBS MODERATE 35: CPT | Performed by: INTERNAL MEDICINE

## 2019-03-01 PROCEDURE — 27210429 ZZH NUTRITION PRODUCT INTERMEDIATE LITER

## 2019-03-01 PROCEDURE — 80048 BASIC METABOLIC PNL TOTAL CA: CPT | Performed by: INTERNAL MEDICINE

## 2019-03-01 PROCEDURE — 12000000 ZZH R&B MED SURG/OB

## 2019-03-01 RX ORDER — VANCOMYCIN HYDROCHLORIDE 1 G/200ML
1000 INJECTION, SOLUTION INTRAVENOUS EVERY 12 HOURS
Status: COMPLETED | OUTPATIENT
Start: 2019-03-01 | End: 2019-03-01

## 2019-03-01 RX ORDER — HYDROCORTISONE 10 MG/1
10 TABLET ORAL
Status: DISCONTINUED | OUTPATIENT
Start: 2019-03-01 | End: 2019-03-01

## 2019-03-01 RX ORDER — HYDROCORTISONE 10 MG/1
10 TABLET ORAL
Status: DISCONTINUED | OUTPATIENT
Start: 2019-03-02 | End: 2019-03-04 | Stop reason: HOSPADM

## 2019-03-01 RX ORDER — HYDROCORTISONE 20 MG/1
60 TABLET ORAL EVERY MORNING
Status: COMPLETED | OUTPATIENT
Start: 2019-03-01 | End: 2019-03-01

## 2019-03-01 RX ORDER — HYDROCORTISONE 20 MG/1
20 TABLET ORAL DAILY
Status: DISCONTINUED | OUTPATIENT
Start: 2019-03-02 | End: 2019-03-04 | Stop reason: HOSPADM

## 2019-03-01 RX ADMIN — CARBAMAZEPINE 150 MG: 100 SUSPENSION ORAL at 05:47

## 2019-03-01 RX ADMIN — CARBAMAZEPINE 150 MG: 100 SUSPENSION ORAL at 23:26

## 2019-03-01 RX ADMIN — BRIVARACETAM 100 MG: 10 SOLUTION ORAL at 10:42

## 2019-03-01 RX ADMIN — VANCOMYCIN HYDROCHLORIDE 1000 MG: 1 INJECTION, SOLUTION INTRAVENOUS at 00:35

## 2019-03-01 RX ADMIN — ENOXAPARIN SODIUM 40 MG: 40 INJECTION SUBCUTANEOUS at 09:16

## 2019-03-01 RX ADMIN — CARBAMAZEPINE 150 MG: 100 SUSPENSION ORAL at 17:39

## 2019-03-01 RX ADMIN — VANCOMYCIN HYDROCHLORIDE 1000 MG: 1 INJECTION, SOLUTION INTRAVENOUS at 11:51

## 2019-03-01 RX ADMIN — PIPERACILLIN SODIUM,TAZOBACTAM SODIUM 4.5 G: 4; .5 INJECTION, POWDER, FOR SOLUTION INTRAVENOUS at 22:14

## 2019-03-01 RX ADMIN — PIPERACILLIN SODIUM,TAZOBACTAM SODIUM 4.5 G: 4; .5 INJECTION, POWDER, FOR SOLUTION INTRAVENOUS at 17:33

## 2019-03-01 RX ADMIN — SODIUM BICARBONATE 20 MG: 84 INJECTION, SOLUTION INTRAVENOUS at 22:14

## 2019-03-01 RX ADMIN — POTASSIUM & SODIUM PHOSPHATES POWDER PACK 280-160-250 MG 1 PACKET: 280-160-250 PACK at 22:14

## 2019-03-01 RX ADMIN — SODIUM BICARBONATE 20 MG: 84 INJECTION, SOLUTION INTRAVENOUS at 10:41

## 2019-03-01 RX ADMIN — LEVOTHYROXINE SODIUM 137 MCG: 112 TABLET ORAL at 05:47

## 2019-03-01 RX ADMIN — CARBAMAZEPINE 150 MG: 100 SUSPENSION ORAL at 01:10

## 2019-03-01 RX ADMIN — PIPERACILLIN SODIUM,TAZOBACTAM SODIUM 4.5 G: 4; .5 INJECTION, POWDER, FOR SOLUTION INTRAVENOUS at 05:04

## 2019-03-01 RX ADMIN — BRIVARACETAM 100 MG: 10 SOLUTION ORAL at 22:14

## 2019-03-01 RX ADMIN — SODIUM CHLORIDE, POTASSIUM CHLORIDE, SODIUM LACTATE AND CALCIUM CHLORIDE: 600; 310; 30; 20 INJECTION, SOLUTION INTRAVENOUS at 05:07

## 2019-03-01 RX ADMIN — POTASSIUM & SODIUM PHOSPHATES POWDER PACK 280-160-250 MG 1 PACKET: 280-160-250 PACK at 17:33

## 2019-03-01 RX ADMIN — HYDROCORTISONE 60 MG: 20 TABLET ORAL at 10:44

## 2019-03-01 RX ADMIN — PIPERACILLIN SODIUM,TAZOBACTAM SODIUM 4.5 G: 4; .5 INJECTION, POWDER, FOR SOLUTION INTRAVENOUS at 10:37

## 2019-03-01 RX ADMIN — POTASSIUM & SODIUM PHOSPHATES POWDER PACK 280-160-250 MG 1 PACKET: 280-160-250 PACK at 10:50

## 2019-03-01 RX ADMIN — HYDROCORTISONE 30 MG: 20 TABLET ORAL at 17:33

## 2019-03-01 RX ADMIN — CARBAMAZEPINE 150 MG: 100 SUSPENSION ORAL at 11:11

## 2019-03-01 ASSESSMENT — ACTIVITIES OF DAILY LIVING (ADL)
ADLS_ACUITY_SCORE: 44
ADLS_ACUITY_SCORE: 46

## 2019-03-01 NOTE — PLAN OF CARE
Alert. Responds by nodding head to yes or no questions.Total care, turn and repo q2h. On contact precautions educated mom. VSS on RA. Tele SR. Denies any pain. Incontinent of urine and stool.changed q 1-2 hrs. tube  feed  restarted tube feedings at 100, NPO. Coccyx red and excoriated wound care saw. Pt has a penile wound, from a condom cath per previous admission charting - protective skin barrier applied. LR infusing at 100 mL/hr. Pt gets zosyn and vanco. Will continue to monitor.

## 2019-03-01 NOTE — PLAN OF CARE
Alert, HECTOR orientation. Patient able to nod head yes/no to questions. L/s diminished. Tele SR. VSS on RA. Denies pain. NPO. Receiving G-tube feeding 100cc/hr from 7am to 7pm. NPO.  @0200. Oral care done. Total care. Incontinent of urine and stool. Redness to coccyx and penile area-barrier cream and powder applied. PIV infusing. Receiving iv zosyn and Vanco. Up with mechanical life w/ Ax2. Discharge pending progress. Nursing continue to monitor.

## 2019-03-01 NOTE — PLAN OF CARE
Pt A/O. VSS. Up with lift. Pt looks comfortable. Transferring to station 88, report given. J tube patent. Incontinent of B&B. Mother updated of room change. Tube feeding stopped at 2000. Ordered to restart at 0700.    /68   Pulse 92   Temp (P) 97.7  F (36.5  C) (Oral)   Resp 18   Wt 77.9 kg (171 lb 11.8 oz)   SpO2 (P) 94%   BMI 23.29 kg/m

## 2019-03-01 NOTE — PROGRESS NOTES
The following appt has been arranged for pt and entered in AVS:   Post-hospital follow up appointment with PCP/ Dr Gomez on March 7th Thursday at 11AM  (Address: [address] [place] [address]). Please call [phone]  if questions.

## 2019-03-01 NOTE — PROGRESS NOTES
[facility]    Medicine Progress Note - Hospitalist Service       Date of Admission:  2/27/2019  Assessment & Plan   Keyon Farias is a 58 year-old male with a history of TBI with subsequent hemiplegia, panhypopituitarism and feeding tube in place complicated by recurrent aspiration pneumonia who was just discharged from the hospital on 2/22/19 for it who presented to the ED on 2/27/19 with a fever and CXR concerning for recurrence of his aspiration pneumonia.     Recurrent aspiration pneumonia  Sepsis due to above  Just discharged from the hospital on 2/22/19 with Augmentin for recurrent aspiration pneumonia. Day of admission had began to have increasing temperatures so mother called EMS. Here the patient was noted to have a fever of 102.4 F and a WBC of 16.0. CXR here with stable to slightly increased opacity at the right lung base.  Lactate was elevated at 4.2, WBC elevated to 16.4.  Influenza A/B negative.  UA unremarkable.  Patient had episodes of emesis day of admission as well as chronic difficulties with secretion management.  At this point not clear that presentation represents new bacterial pneumonia versus chemical/secretion aspiration pneumonitis.  Note his mother has questioned if the reason for his presentation is because of inadequate treatment with his last pneumonia, given his improvement to baseline on prior antibiotic course with new acute decompensation highly doubt that this is the case.  - Afebrile  - WBC improving  - NPO except for tube feeds  - Speech evaluation placed, not appropriate for swallow evaluation and recovery of swallow is not expected   - Discontinue IVF  - Blood cultures NGTD  - Continue piperacillin-tazobactam IV and vancomycin IV. Stop vancomycin after PM dose 3/1, resume if blood cultures become positive with GPC  - Glycopyrrolate PRN for secretions; see PCP note from 2/26/19 for summary of secretion management    H/o TBI  Hemiplegia   Panhypopituitarism  "  Seizure disorder   Stable at this time.  No signs of adrenal insufficiency at this time.  - PTA Brivaracetam, Tegretol  - PTA Synthroid 137 mcg  - Day 3 of 3 for stress dose steroids, resume normal regimen 3/2/19     Advance care planning  Extended discussion had with mother via phone 2/28/19, given this is his third hospitalization for the same issue in 1 month.  Discussed that despite best efforts to prevent this, including multiple attempts at adjusting his secretion management by his primary care provider, and GJ tube feeds that he continues to have aspiration events leading to hospitalization and at times significant illness ([facility] admission). Discussed that even should he recover from this illness, it is a question of \"when\" not \"if\" he has his next event and is re-hospitalized.  Introduced role of palliative care.  After discussion she remained adamant that she is \"not going to give up on him and let him die.\" Discussed that at any time he could have an even more severe event, that could potentially result in an ICU admission, breathing tube or death. She stated \"well he's not there now.\" Of note she remains hopeful that his aspiration will be solved by returning to eating by mouth. Note that he has had multiple SLP evaluations, most recently 12/18/18 with the assessment at that time that he would have no further recovery.   - SLP consulted this admission, not appropriate for bedside swallow assessment, referred to 12/18/18 note and signed off  - Despite his recurrent admissions, he appears to have relatively rapid recovery to baseline.   - Goals remain restorative at this time, full code  - Palliative care consult not placed as mother was not open to discuss advanced care planning further with them this admission    Penile pressure injury, present on admission  - Wound RN consulted, cares per wound RN  - Wound RN also assessed coccyx, reddened, but no active pressure injury    Diet: NPO for Medical/Clinical " Reasons Except for: NPO but receiving Tube Feeding    DVT Prophylaxis: Enoxaparin (Lovenox) SQ  Lerma Catheter: not present  Code Status: Full Code      Disposition Plan   Expected discharge: 1-2, recommended to prior living arrangement once transitioned to oral antibiotics.  Entered: Feng Cook MD 03/01/2019, 7:46 AM     The patient's care was discussed with the Bedside Nurse, Patient and Patient's mother/guardian .    Feng Cook MD  Hospitalist Service  [facility]    ______________________________________________________________________    Interval History   No acute events overnight. Patient able to nod head yes/no to questions. Denies shortness of breath or pain.     Data reviewed today: I reviewed all medications, new labs and imaging results over the last 24 hours. I personally reviewed no images or EKG's today.    Physical Exam   Vital Signs: Temp: 95.5  F (35.3  C) Temp src: Axillary BP: 143/60 Pulse: 92 Heart Rate: 69 Resp: 18 SpO2: 97 % O2 Device: None (Room air)    Weight: 166 lbs 8 oz    Constitutional: NAD  Respiratory: Clear to auscultation bilaterally, good air movement bilaterally  Cardiovascular: RRR, no m/r/g. No peripheral edema.  GI: Soft, non-tender, non-distended. BS normoactive.   Skin/Integumen: Warm, dry  Neuro: Alert. Pleasant and happy. Nods head yes/no to questions.     Data   Recent Labs   Lab 03/01/19  0737 02/28/19  0532 02/27/19  2048   WBC 11.8* 20.4* 16.0*   HGB 11.3* 11.0* 13.1*   MCV 84 82 83    222 251   NA  --  139 132*   POTASSIUM  --  4.8 4.5   CHLORIDE  --  109 96   CO2  --  27 26   BUN  --  13 16   CR  --  0.74 0.69   ANIONGAP  --  3 10   STEVE  --  7.9* 8.2*   GLC  --  129* 91   ALBUMIN  --   --  3.3*   PROTTOTAL  --   --  8.3   BILITOTAL  --   --  0.2   ALKPHOS  --   --  99   ALT  --   --  28   AST  --   --  17       No results found for this or any previous visit (from the past 24 hour(s)).  Medications     IV fluid REPLACEMENT ONLY        lactated ringers 100 mL/hr at 03/01/19 0507       Brivaracetam  100 mg Oral BID     carBAMazepine  150 mg Oral Q6H     enoxaparin  40 mg Subcutaneous Q24H     [START ON 3/2/2019] hydrocortisone  10 mg Oral Daily with supper     [START ON 3/2/2019] hydrocortisone  20 mg Oral Daily     hydrocortisone  30 mg Oral Daily with supper     hydrocortisone  60 mg Oral QAM     levothyroxine  137 mcg Oral Daily     pantoprazole  20 mg Per NG tube BID     piperacillin-tazobactam  4.5 g Intravenous Q6H     potassium & sodium phosphates  1 packet Oral TID     sodium chloride (PF)  3 mL Intracatheter Q8H     vancomycin (VANCOCIN) IV  1,000 mg Intravenous Q12H

## 2019-03-02 LAB
CREAT SERPL-MCNC: 0.92 MG/DL (ref 0.66–1.25)
GFR SERPL CREATININE-BSD FRML MDRD: >90 ML/MIN/{1.73_M2}
GLUCOSE BLDC GLUCOMTR-MCNC: 101 MG/DL (ref 70–99)
PLATELET # BLD AUTO: 230 10E9/L (ref 150–450)
PROCALCITONIN SERPL-MCNC: 0.34 NG/ML

## 2019-03-02 PROCEDURE — 25000132 ZZH RX MED GY IP 250 OP 250 PS 637: Mod: GY | Performed by: INTERNAL MEDICINE

## 2019-03-02 PROCEDURE — 25000132 ZZH RX MED GY IP 250 OP 250 PS 637: Mod: GY | Performed by: HOSPITALIST

## 2019-03-02 PROCEDURE — 00000146 ZZHCL STATISTIC GLUCOSE BY METER IP

## 2019-03-02 PROCEDURE — A9270 NON-COVERED ITEM OR SERVICE: HCPCS | Mod: GY | Performed by: HOSPITALIST

## 2019-03-02 PROCEDURE — 84145 PROCALCITONIN (PCT): CPT | Performed by: INTERNAL MEDICINE

## 2019-03-02 PROCEDURE — 82565 ASSAY OF CREATININE: CPT | Performed by: INTERNAL MEDICINE

## 2019-03-02 PROCEDURE — 99232 SBSQ HOSP IP/OBS MODERATE 35: CPT | Performed by: HOSPITALIST

## 2019-03-02 PROCEDURE — 27210429 ZZH NUTRITION PRODUCT INTERMEDIATE LITER

## 2019-03-02 PROCEDURE — A9270 NON-COVERED ITEM OR SERVICE: HCPCS | Mod: GY | Performed by: INTERNAL MEDICINE

## 2019-03-02 PROCEDURE — 25000128 H RX IP 250 OP 636: Performed by: INTERNAL MEDICINE

## 2019-03-02 PROCEDURE — 36415 COLL VENOUS BLD VENIPUNCTURE: CPT | Performed by: INTERNAL MEDICINE

## 2019-03-02 PROCEDURE — 12000000 ZZH R&B MED SURG/OB

## 2019-03-02 PROCEDURE — 25000125 ZZHC RX 250: Performed by: INTERNAL MEDICINE

## 2019-03-02 PROCEDURE — 85049 AUTOMATED PLATELET COUNT: CPT | Performed by: INTERNAL MEDICINE

## 2019-03-02 RX ORDER — SACCHAROMYCES BOULARDII 250 MG
250 CAPSULE ORAL 2 TIMES DAILY
Status: DISCONTINUED | OUTPATIENT
Start: 2019-03-02 | End: 2019-03-04 | Stop reason: HOSPADM

## 2019-03-02 RX ADMIN — POTASSIUM & SODIUM PHOSPHATES POWDER PACK 280-160-250 MG 1 PACKET: 280-160-250 PACK at 10:12

## 2019-03-02 RX ADMIN — ENOXAPARIN SODIUM 40 MG: 40 INJECTION SUBCUTANEOUS at 10:33

## 2019-03-02 RX ADMIN — PIPERACILLIN SODIUM,TAZOBACTAM SODIUM 4.5 G: 4; .5 INJECTION, POWDER, FOR SOLUTION INTRAVENOUS at 23:23

## 2019-03-02 RX ADMIN — POTASSIUM & SODIUM PHOSPHATES POWDER PACK 280-160-250 MG 1 PACKET: 280-160-250 PACK at 16:13

## 2019-03-02 RX ADMIN — HYDROCORTISONE 10 MG: 10 TABLET ORAL at 17:57

## 2019-03-02 RX ADMIN — POTASSIUM & SODIUM PHOSPHATES POWDER PACK 280-160-250 MG 1 PACKET: 280-160-250 PACK at 21:33

## 2019-03-02 RX ADMIN — Medication 250 MG: at 21:33

## 2019-03-02 RX ADMIN — PIPERACILLIN SODIUM,TAZOBACTAM SODIUM 4.5 G: 4; .5 INJECTION, POWDER, FOR SOLUTION INTRAVENOUS at 03:35

## 2019-03-02 RX ADMIN — SODIUM BICARBONATE 20 MG: 84 INJECTION, SOLUTION INTRAVENOUS at 21:33

## 2019-03-02 RX ADMIN — SODIUM BICARBONATE 20 MG: 84 INJECTION, SOLUTION INTRAVENOUS at 10:08

## 2019-03-02 RX ADMIN — BRIVARACETAM 100 MG: 10 SOLUTION ORAL at 10:04

## 2019-03-02 RX ADMIN — CARBAMAZEPINE 150 MG: 100 SUSPENSION ORAL at 05:32

## 2019-03-02 RX ADMIN — CARBAMAZEPINE 150 MG: 100 SUSPENSION ORAL at 23:56

## 2019-03-02 RX ADMIN — CARBAMAZEPINE 150 MG: 100 SUSPENSION ORAL at 13:28

## 2019-03-02 RX ADMIN — PIPERACILLIN SODIUM,TAZOBACTAM SODIUM 4.5 G: 4; .5 INJECTION, POWDER, FOR SOLUTION INTRAVENOUS at 16:06

## 2019-03-02 RX ADMIN — CARBAMAZEPINE 150 MG: 100 SUSPENSION ORAL at 17:07

## 2019-03-02 RX ADMIN — LEVOTHYROXINE SODIUM 137 MCG: 112 TABLET ORAL at 05:32

## 2019-03-02 RX ADMIN — HYDROCORTISONE 20 MG: 20 TABLET ORAL at 10:10

## 2019-03-02 RX ADMIN — BRIVARACETAM 100 MG: 10 SOLUTION ORAL at 21:32

## 2019-03-02 ASSESSMENT — ACTIVITIES OF DAILY LIVING (ADL)
ADLS_ACUITY_SCORE: 46
ADLS_ACUITY_SCORE: 44
ADLS_ACUITY_SCORE: 44
ADLS_ACUITY_SCORE: 46
ADLS_ACUITY_SCORE: 44
ADLS_ACUITY_SCORE: 46

## 2019-03-02 NOTE — PLAN OF CARE
"7A-7P shift:   Is cooperative with most cares - dislikes and refuses most oral care. Pt has small amt of thick yellow  oral secretions that required oral suctioning. Pt unable to clear his throat. Is unsafe for swallowing anything orally   Wakeful, alert, Communicates by noding head yes/now to questions or gives a \"thumbs up\" when he approves of a nursing task/intervention.  Unable to fully assess orientation due to severe aphasia. Lungs diminished anteriorly. Tele SR. VSS on RA. Denies pain with exception of discomfort/swelling at RUE IV site. IV site was dc'd/ RUE was elevated and new IV was placed by resource nurse.   NPO. Receiving G-tube feeding 100cc/hr from 7am to 7pm. NPO.  Remains incontinent of urine thus unable to measure output.  Redness to coccyx and penile area-barrier cream and powder applied per WOC orders  after each incontinence. Remains on IV zosyn. Has been in bed this shift with repo every 2 hrs. Requires mechanical lift for transfers. Discharge pending progress.         1800 Addendum: pt accepted oral care using toothetes at 1230 and 1600.  At approx 1800 Pt required oral suction with [place]  for oral secretions.   "

## 2019-03-02 NOTE — PLAN OF CARE
HECTOR pt orientation. Responsive by giving nods and thumbs up. VSS on RA. IV SL + abx. Tube feeding resumed at 0700. Flushed q 4. Repo q 2 w/ 2, lift pt. Incont. WBC trending down. NPO. Plan to discharge 1-2 days. Will continue to monitor.

## 2019-03-02 NOTE — PLAN OF CARE
HECTOR orientation. Awake and alert. Denies pain. Incontinent of urine in large amounts. Jessica area red- cleaned per POC. Lungs clear. NPO (tube feed 7a-7p). G-tube clamped. Turned and repositioned Q2H. PIV saline locked between antibiotics.

## 2019-03-02 NOTE — PROGRESS NOTES
[facility]  Hospitalist Progress Note        Hayden Epperson,   03/02/2019        Interval History:      Patient appears pleasant. Discussed with nursing staff and care coordinator.          Assessment and Plan:        58 year-old male with a history of TBI with subsequent hemiplegia, panhypopituitarism and feeding tube in place complicated by recurrent aspiration pneumonia who was just discharged from the hospital on 2/22/19 for it who presented to the ED on 2/27/19 with a fever and CXR concerning for recurrence of his aspiration pneumonia.     Recurrent aspiration pneumonia  Sepsis due to above Just discharged from the hospital on 2/22/19 with Augmentin for recurrent aspiration pneumonia. Day of admission had began to have increasing temperatures so mother called EMS. Here the patient was noted to have a fever of 102.4 F and a WBC of 16.0. CXR here with stable to slightly increased opacity at the right lung base.  Lactate was elevated at 4.2, WBC elevated to 16.4.  Influenza A/B negative.  UA unremarkable.  Patient had episodes of emesis day of admission as well as chronic difficulties with secretion management.  At this point not clear that presentation represents new bacterial pneumonia versus chemical/secretion aspiration pneumonitis.  Note his mother has questioned if the reason for his presentation is because of inadequate treatment with his last pneumonia, given his improvement to baseline on prior antibiotic course with new acute decompensation highly doubt that this is the case.  - NPO except for tube feeds  - Speech evaluation   - Blood cultures NGTD  - Continue piperacillin-tazobactam IV and vancomycin IV.   - Glycopyrrolate PRN for secretions; see PCP note from 2/26/19 for summary of secretion management     H/o TBI  Hemiplegia   Panhypopituitarism   Seizure disorder   Stable at this time.  No signs of adrenal insufficiency at this time.  - PTA Brivaracetam, Tegretol  - PTA  "Synthroid 137 mcg  - Day 3 of 3 for stress dose steroids, resume normal regimen 3/2/19      Advance care planning  Extended discussion had with mother via phone 2/28/19, given this is his third hospitalization for the same issue in 1 month.  Discussed that despite best efforts to prevent this, including multiple attempts at adjusting his secretion management by his primary care provider, and GJ tube feeds that he continues to have aspiration events leading to hospitalization and at times significant illness ([facility] admission). Discussed that even should he recover from this illness, it is a question of \"when\" not \"if\" he has his next event and is re-hospitalized.  Introduced role of palliative care.  After discussion she remained adamant that she is \"not going to give up on him and let him die.\" Discussed that at any time he could have an even more severe event, that could potentially result in an ICU admission, breathing tube or death. She stated \"well he's not there now.\" Of note she remains hopeful that his aspiration will be solved by returning to eating by mouth. Note that he has had multiple SLP evaluations, most recently 12/18/18 with the assessment at that time that he would have no further recovery.   - SLP consulted this admission, not appropriate for bedside swallow assessment, referred to 12/18/18 note and signed off  - Despite his recurrent admissions, he appears to have relatively rapid recovery to baseline.   - Goals remain restorative at this time, full code  - Palliative care consult not placed as mother was not open to discuss advanced care planning further with them this admission     Penile pressure injury, present on admission  - Wound RN consulted, cares per wound RN  - Wound RN also assessed coccyx, reddened, but no active pressure injury    DVT Prophylaxis: Enoxaparin (Lovenox) subcutaneous    Code: Full Code      Disposition: Discharge in 1-2 days upon transition to oral antibiotics.            "         Physical Exam:      Heart Rate: 84    Blood pressure 146/65, pulse 92, temperature 96  F (35.6  C), temperature source Axillary, resp. rate 18, weight 75.7 kg (166 lb 14.4 oz), SpO2 95 %.    Vitals:    19 0623 19 0614 19 0637   Weight: 77.9 kg (171 lb 11.8 oz) 75.5 kg (166 lb 8 oz) 75.7 kg (166 lb 14.4 oz)       Vital Sign Ranges  Temperature Temp  Av.8  F (36  C)  Min: 95.6  F (35.3  C)  Max: 98.7  F (37.1  C)   Blood pressure Systolic (24hrs), Av , Min:131 , Max:146        Diastolic (24hrs), Av, Min:61, Max:65      Pulse No Data Recorded   Respirations Resp  Av.3  Min: 16  Max: 18   Pulse oximetry SpO2  Av.7 %  Min: 94 %  Max: 95 %     Vital Signs with Ranges  Temp:  [95.6  F (35.3  C)-98.7  F (37.1  C)] 96  F (35.6  C)  Heart Rate:  [79-96] 84  Resp:  [16-18] 18  BP: (131-146)/(61-65) 146/65  SpO2:  [94 %-95 %] 95 %    I/O Last 3 Shifts:   I/O last 3 completed shifts:  In: 1850 [NG/GT:550]  Out: -     I/O past 24 hours:     Intake/Output Summary (Last 24 hours) at 3/2/2019 0811  Last data filed at 3/2/2019 0538  Gross per 24 hour   Intake 1790 ml   Output --   Net 1790 ml     GENERAL: Alert. NAD. Frequently smiling and giving thumbs up sign.   HEENT: Normocephalic. EOMI. No icterus or injection. Nares normal.   LUNGS: Clear, minimal decrement to base. No dyspnea at rest.   HEART: Regular rate. Extremities perfused.   ABDOMEN: Soft, nontender, and nondistended. Positive bowel sounds.   EXTREMITIES: No LE edema noted.   NEUROLOGIC: Follows commands.          Prior to Admission Medications:        Medications Prior to Admission   Medication Sig Dispense Refill Last Dose     acetaminophen (TYLENOL) 500 MG tablet 1,000 mg by Oral or FT or NG tube route every 6 hours as needed for mild pain   prn     albuterol (2.5 MG/3ML) 0.083% neb solution Take 1 vial by nebulization every 4 hours as needed for shortness of breath / dyspnea or wheezing   prn     []  amoxicillin-clavulanate (AUGMENTIN) 875-125 MG tablet 1 tablet by Per Feeding Tube route every 12 hours for 7 days 14 tablet 0 2/27/2019 at x2     bacitracin ointment Apply topically daily To PEG site.    2/19/2019 at am     Brivaracetam (BRIVIACT) 10 MG/ML solution 100 mg by Oral or FT or NG tube route 2 times daily @0900 and 2100   2/27/2019 at x2     calcium carbonate 1250 (500 CA) MG/5ML SUSP suspension 5 mLs (1,250 mg) by Per J Tube route 3 times daily (with meals) (Patient taking differently: by Per J Tube route 3 times daily (with meals) ) 450 mL  2/27/2019 at x3     carBAMazepine (TEGRETOL) 100 MG/5ML suspension Take 150 mg by mouth every 6 hours At 06:00, 12:00, 18:00 and 24:00 for seizures   2/27/2019 at x2     hydrocortisone (CORTEF) 5 MG tablet 10 mg by Oral or FT or NG tube route daily (with dinner) At 1500   2/27/2019 at Unknown time     hydrocortisone (CORTEF) 5 MG tablet 20 mg by Oral or FT or NG tube route every morning    2/27/2019 at Unknown time     levothyroxine (SYNTHROID/LEVOTHROID) 137 MCG tablet 137 mcg by Oral or FT or NG tube route daily   2/27/2019 at Unknown time     melatonin (MELATONIN) 1 MG/ML LIQD liquid 6 mg by Per NG tube route At Bedtime    2/26/2019 at Unknown time     Multiple Vitamins-Minerals (CENTRUM SILVER) per tablet Take 1 tablet by mouth daily Crush and feed via j-tube   2/27/2019 at Unknown time     pantoprazole (PROTONIX) 2 mg/mL SUSP suspension 20 mg by Per NG tube route 2 times daily    2/27/2019 at x2     potassium & sodium phosphates (NEUTRA-PHOS) 280-160-250 MG Packet Take 1 packet by mouth 3 times daily 0900, 1500, 2100.    2/27/2019 at x3     saccharomyces boulardii (FLORASTOR) 250 MG capsule 250 mg by Oral or Feeding Tube route daily   2/27/2019 at Unknown time     scopolamine (TRANSDERM-SCOP, 1.5 MG,) 1 MG/3DAYS 72 hr patch Place 1 patch onto the skin every 72 hours   not on now     testosterone cypionate (DEPOTESTOTERONE CYPIONATE) 200 MG/ML injection Inject  76 mg into the muscle See Admin Instructions Every 2 weeks on Fridays   76 mg or 0.38 mL   2/15/2019     Vitamin D, Cholecalciferol, 1000 units TABS Take 2,000 Units by mouth every morning Crush and feed via j-tube   2/27/2019 at Unknown time     Wheat Dextrin (BENEFIBER) POWD 2 teaspoonful by Per NG tube route daily    2/27/2019 at Unknown time            Medications:        Current Facility-Administered Medications   Medication Last Rate     IV fluid REPLACEMENT ONLY       Current Facility-Administered Medications   Medication Dose Route Frequency     Brivaracetam  100 mg Oral BID     carBAMazepine  150 mg Oral Q6H     enoxaparin  40 mg Subcutaneous Q24H     hydrocortisone  10 mg Oral Daily with supper     hydrocortisone  20 mg Oral Daily     levothyroxine  137 mcg Oral Daily     pantoprazole  20 mg Per NG tube BID     piperacillin-tazobactam  4.5 g Intravenous Q6H     potassium & sodium phosphates  1 packet Oral TID     sodium chloride (PF)  3 mL Intracatheter Q8H     Current Facility-Administered Medications   Medication Dose Route Frequency     acetaminophen  650 mg Rectal Q4H PRN     acetaminophen  650 mg Oral Q4H PRN     IV fluid REPLACEMENT ONLY   Intravenous Continuous PRN     glycopyrrolate  1-2 mg Oral BID PRN     lidocaine 4%   Topical Q1H PRN     lidocaine (buffered or not buffered)  0.1-1 mL Other Q1H PRN     melatonin  1 mg Oral At Bedtime PRN     naloxone  0.1-0.4 mg Intravenous Q2 Min PRN     nitroGLYcerin  0.4 mg Sublingual Q5 Min PRN     ondansetron  4 mg Oral Q6H PRN    Or     ondansetron  4 mg Intravenous Q6H PRN     sodium chloride (PF)  3 mL Intracatheter q1 min prn            Data:      Lab data reviewed.   Recent Labs   Lab 03/02/19  0720 03/01/19  0737 02/28/19  0532 02/27/19 2048   HGB  --  11.3* 11.0* 13.1*   MCV  --  84 82 83    218 222 251   NA  --  144 139 132*   POTASSIUM  --  3.6 4.8 4.5   CHLORIDE  --  110* 109 96   CO2  --  27 27 26   BUN  --  12 13 16   CR  --  0.83 0.74  0.69   ANIONGAP  --  7 3 10   STEVE  --  8.4* 7.9* 8.2*   GLC  --  84 129* 91           Imaging:      Imaging data reviewed.     Dr. Hayden Epperson D.O.  [facility]  Pager [phone]

## 2019-03-02 NOTE — PROGRESS NOTES
Received request to speak with pt's mother  She is asking that pt be started on a probiotic  Mom is also asking about Vitamins to increase his immune system  We discussed that TF meets 100% RDI  Additional option may be Vit C - discussed that it is water soluble and would not give more than 1000 mg per day  Encouraged mother to speak with MD about adding additional vitamins/supplements    Paged Hospitalist - please order a probiotic if in agreement.    Aurora Yousif RD, LD  Clinical Dietitian - [facility]  Pager - [phone]

## 2019-03-02 NOTE — PLAN OF CARE
"7A-7P shift:  Awake, alert, nods head yes/now to questions. Gives a \"thumbs up\" when he approves of a nursing task/intervention Unable to fully assess orientation due to severe aphasia. Lungs diminished anteriorly. Tele SR. VSS on RA. Denies pain. NPO. Receiving G-tube feeding 100cc/hr from 7am to 7pm. NPO. Pt refused my offers of any oral care.  Remains incontinent of urine thus unable to measure output.  Redness to coccyx and penile area-barrier cream and powder applied per WOC order x 2 this shift . PIV infusing. Receiving iv zosyn and Vanco. Up with mechanical life w/ Ax2. Discharge pending progress. Nursing continue to monitor.    "

## 2019-03-03 LAB
ANION GAP SERPL CALCULATED.3IONS-SCNC: 7 MMOL/L (ref 3–14)
BUN SERPL-MCNC: 16 MG/DL (ref 7–30)
CALCIUM SERPL-MCNC: 8.6 MG/DL (ref 8.5–10.1)
CHLORIDE SERPL-SCNC: 106 MMOL/L (ref 94–109)
CO2 SERPL-SCNC: 27 MMOL/L (ref 20–32)
CREAT SERPL-MCNC: 0.95 MG/DL (ref 0.66–1.25)
ERYTHROCYTE [DISTWIDTH] IN BLOOD BY AUTOMATED COUNT: 14.6 % (ref 10–15)
GFR SERPL CREATININE-BSD FRML MDRD: 89 ML/MIN/{1.73_M2}
GLUCOSE SERPL-MCNC: 133 MG/DL (ref 70–99)
HCT VFR BLD AUTO: 38.4 % (ref 40–53)
HGB BLD-MCNC: 12.4 G/DL (ref 13.3–17.7)
MCH RBC QN AUTO: 27.1 PG (ref 26.5–33)
MCHC RBC AUTO-ENTMCNC: 32.3 G/DL (ref 31.5–36.5)
MCV RBC AUTO: 84 FL (ref 78–100)
PLATELET # BLD AUTO: 246 10E9/L (ref 150–450)
POTASSIUM SERPL-SCNC: 3.2 MMOL/L (ref 3.4–5.3)
PROCALCITONIN SERPL-MCNC: 0.14 NG/ML
RBC # BLD AUTO: 4.58 10E12/L (ref 4.4–5.9)
SODIUM SERPL-SCNC: 140 MMOL/L (ref 133–144)
WBC # BLD AUTO: 9.4 10E9/L (ref 4–11)

## 2019-03-03 PROCEDURE — 84145 PROCALCITONIN (PCT): CPT | Performed by: HOSPITALIST

## 2019-03-03 PROCEDURE — A9270 NON-COVERED ITEM OR SERVICE: HCPCS | Mod: GY | Performed by: HOSPITALIST

## 2019-03-03 PROCEDURE — 36415 COLL VENOUS BLD VENIPUNCTURE: CPT | Performed by: HOSPITALIST

## 2019-03-03 PROCEDURE — A9270 NON-COVERED ITEM OR SERVICE: HCPCS | Mod: GY | Performed by: INTERNAL MEDICINE

## 2019-03-03 PROCEDURE — 27210429 ZZH NUTRITION PRODUCT INTERMEDIATE LITER

## 2019-03-03 PROCEDURE — 25000125 ZZHC RX 250: Performed by: INTERNAL MEDICINE

## 2019-03-03 PROCEDURE — 85027 COMPLETE CBC AUTOMATED: CPT | Performed by: HOSPITALIST

## 2019-03-03 PROCEDURE — 99232 SBSQ HOSP IP/OBS MODERATE 35: CPT | Performed by: HOSPITALIST

## 2019-03-03 PROCEDURE — 12000000 ZZH R&B MED SURG/OB

## 2019-03-03 PROCEDURE — 80048 BASIC METABOLIC PNL TOTAL CA: CPT | Performed by: HOSPITALIST

## 2019-03-03 PROCEDURE — 25000128 H RX IP 250 OP 636: Performed by: INTERNAL MEDICINE

## 2019-03-03 PROCEDURE — 25000132 ZZH RX MED GY IP 250 OP 250 PS 637: Mod: GY | Performed by: INTERNAL MEDICINE

## 2019-03-03 PROCEDURE — 25000132 ZZH RX MED GY IP 250 OP 250 PS 637: Mod: GY | Performed by: HOSPITALIST

## 2019-03-03 RX ORDER — AMOXICILLIN 250 MG
1 CAPSULE ORAL 2 TIMES DAILY PRN
Status: DISCONTINUED | OUTPATIENT
Start: 2019-03-03 | End: 2019-03-04 | Stop reason: HOSPADM

## 2019-03-03 RX ORDER — BISACODYL 10 MG
10 SUPPOSITORY, RECTAL RECTAL DAILY PRN
Status: DISCONTINUED | OUTPATIENT
Start: 2019-03-03 | End: 2019-03-04 | Stop reason: HOSPADM

## 2019-03-03 RX ORDER — AMOXICILLIN 250 MG
2 CAPSULE ORAL 2 TIMES DAILY PRN
Status: DISCONTINUED | OUTPATIENT
Start: 2019-03-03 | End: 2019-03-04 | Stop reason: HOSPADM

## 2019-03-03 RX ORDER — SENNOSIDES 8.6 MG
1-2 TABLET ORAL 2 TIMES DAILY PRN
Status: DISCONTINUED | OUTPATIENT
Start: 2019-03-03 | End: 2019-03-04 | Stop reason: HOSPADM

## 2019-03-03 RX ORDER — POLYETHYLENE GLYCOL 3350 17 G/17G
17 POWDER, FOR SOLUTION ORAL DAILY PRN
Status: DISCONTINUED | OUTPATIENT
Start: 2019-03-03 | End: 2019-03-04 | Stop reason: HOSPADM

## 2019-03-03 RX ADMIN — LEVOTHYROXINE SODIUM 137 MCG: 112 TABLET ORAL at 06:42

## 2019-03-03 RX ADMIN — PIPERACILLIN SODIUM,TAZOBACTAM SODIUM 4.5 G: 4; .5 INJECTION, POWDER, FOR SOLUTION INTRAVENOUS at 17:08

## 2019-03-03 RX ADMIN — PIPERACILLIN SODIUM,TAZOBACTAM SODIUM 4.5 G: 4; .5 INJECTION, POWDER, FOR SOLUTION INTRAVENOUS at 09:53

## 2019-03-03 RX ADMIN — BRIVARACETAM 100 MG: 10 SOLUTION ORAL at 21:52

## 2019-03-03 RX ADMIN — CARBAMAZEPINE 150 MG: 100 SUSPENSION ORAL at 13:32

## 2019-03-03 RX ADMIN — CARBAMAZEPINE 150 MG: 100 SUSPENSION ORAL at 23:54

## 2019-03-03 RX ADMIN — POTASSIUM & SODIUM PHOSPHATES POWDER PACK 280-160-250 MG 1 PACKET: 280-160-250 PACK at 21:53

## 2019-03-03 RX ADMIN — HYDROCORTISONE 20 MG: 20 TABLET ORAL at 09:58

## 2019-03-03 RX ADMIN — Medication 250 MG: at 09:58

## 2019-03-03 RX ADMIN — BRIVARACETAM 100 MG: 10 SOLUTION ORAL at 10:06

## 2019-03-03 RX ADMIN — CARBAMAZEPINE 150 MG: 100 SUSPENSION ORAL at 06:42

## 2019-03-03 RX ADMIN — HYDROCORTISONE 10 MG: 10 TABLET ORAL at 17:10

## 2019-03-03 RX ADMIN — PIPERACILLIN SODIUM,TAZOBACTAM SODIUM 4.5 G: 4; .5 INJECTION, POWDER, FOR SOLUTION INTRAVENOUS at 21:52

## 2019-03-03 RX ADMIN — Medication 250 MG: at 21:15

## 2019-03-03 RX ADMIN — ENOXAPARIN SODIUM 40 MG: 40 INJECTION SUBCUTANEOUS at 10:10

## 2019-03-03 RX ADMIN — SODIUM BICARBONATE 20 MG: 84 INJECTION, SOLUTION INTRAVENOUS at 21:15

## 2019-03-03 RX ADMIN — SODIUM BICARBONATE 20 MG: 84 INJECTION, SOLUTION INTRAVENOUS at 06:42

## 2019-03-03 RX ADMIN — PIPERACILLIN SODIUM,TAZOBACTAM SODIUM 4.5 G: 4; .5 INJECTION, POWDER, FOR SOLUTION INTRAVENOUS at 04:49

## 2019-03-03 RX ADMIN — POTASSIUM & SODIUM PHOSPHATES POWDER PACK 280-160-250 MG 1 PACKET: 280-160-250 PACK at 17:10

## 2019-03-03 RX ADMIN — POTASSIUM & SODIUM PHOSPHATES POWDER PACK 280-160-250 MG 1 PACKET: 280-160-250 PACK at 09:58

## 2019-03-03 RX ADMIN — CARBAMAZEPINE 150 MG: 100 SUSPENSION ORAL at 17:37

## 2019-03-03 ASSESSMENT — ACTIVITIES OF DAILY LIVING (ADL)
ADLS_ACUITY_SCORE: 44

## 2019-03-03 NOTE — PROGRESS NOTES
CLINICAL NUTRITION SERVICES - REASSESSMENT NOTE      Recommendations Ordered by Registered Dietitian (RD):     Increased water flushes to 120 mL QID now that IVF has been d/c'd (pt's home flushes)     Malnutrition: (2/28)  % Weight Loss:  None noted  % Intake:  No decreased intake noted  Subcutaneous Fat Loss:  None observed  Muscle Loss:  None observed  Fluid Retention:  None noted     Malnutrition Diagnosis: Patient does not meet two of the above criteria necessary for diagnosing malnutrition         EVALUATION OF PROGRESS TOWARD GOALS   Diet:  NPO    Pt continues on EN similar to his home regimen    Nutrition Support Enteral:  Type of Feeding Tube: 18 Fr GJ  Enteral Frequency:  12 hr day cycle  Enteral Regimen: Isosource 1.5 @ 100 mL/hr x 12 hrs (7am-7pm)  Total Enteral Provisions: 1800 cals (25 cals/kg), 82 gm pro (1.14 gm/kg), 211 gm CHO, 18 gm fiber, 912 mL free water, 100% RDI  Free Water Flush: 60 mL every 4 hrs (while on IVF)      Chart reviewed  Note poss discharge next 1-2 days    Per visit with pt's mother yesterday (request for probiotics) - Florastor was ordered by MD    IVF has been d/c'd - will need to increase water flushes as per pt runs at home - 120 mL QID    +1 BM yest      ASSESSED NUTRITION NEEDS:  Dosing Weight (3/3 - recalculated) 71.9 kg  Estimated Energy Needs: 1550-6488 kcals (25-30 Kcal/Kg)  Justification: maintenance  Estimated Protein Needs:  grams protein (1.2-1.5 g pro/Kg)  Justification: preservation of lean body mass        NEW FINDINGS:   Vitals:    02/28/19 0623 03/01/19 0614 03/02/19 0637 03/03/19 0314   Weight: 77.9 kg (171 lb 11.8 oz) 75.5 kg (166 lb 8 oz) 75.7 kg (166 lb 14.4 oz) 71.9 kg (158 lb 9.6 oz)     Today's wt closer to usual wt -     Previous Goals (2/28):   Pt to receive nutrition in the next 24-48 hrs  Evaluation: Met    Previous Nutrition Diagnosis (2/28):   Inadequate protein-energy intake related to NPO status, TF not running as evidenced by pt meeting 0%  est needs  Evaluation: Improving        CURRENT NUTRITION DIAGNOSIS  No nutrition diagnosis identified at this time     INTERVENTIONS  Recommendations / Nutrition Prescription  NPO  Increase water flushes    Implementation  Increased water flushes to 120 mL QID now that IVF has been d/c'd (pt's home flushes)    Goals  EN to meet % est needs      MONITORING AND EVALUATION:  Progress towards goals will be monitored and evaluated per protocol and Practice Guidelines

## 2019-03-03 NOTE — PLAN OF CARE
Nonverbal, alert but can not assess orientation nods yes/no. Denies pain. VSS on RA. Incontinent of bowel and bladder, memo area excoriated WOC orders followed. PIV SL locked with intermittent antibiotics. Turned and repo q2h. Total cares.

## 2019-03-03 NOTE — PLAN OF CARE
"7am-3pm shift report:  Overall improved from resp standpoint and seems medically stable (per conversation with Dr Epperson) for discharge. Plan is for Hospitalist to switch IV Zosyn to  and equivalent GJ-Tube friendly route tomorrow. Anticipate discharge tomorrow afternoon to mother's home with [facility] and transport via [facility] via cart.   Is cooperative with most cares - dislikes and refuses most oral care. Pt has small amt of clear oral secretions that required oral suctioning x1. Pt coughs but is unable to clear his throat/secretions which can pool in back of throat. Remains unsafe for swallowing anything oral.  Writer gave status update to his mother and reminded her of the importance of continued NPO status and aspiration precautions  Unable to speak but is very wakeful, alert, and communicates by noding head yes/now to questions or gives a \"thumbs up\"  Unable to fully assess orientation due to severe aphasia. Lungs clear anteriorly. . VSS on RA. Denies pain .  Remains NPO. Receiving G-tube feeding 100cc/hr from 7am to 7pm. NPO.  Remains incontinent of urine thus unable to measure output.  Redness to coccyx and penile area-barrier cream and powder applied per WOC orders  after each incontinence. Was up on chair x1 this shift with use of ceiling lift. Requires repo every 2 hrs when in bed.   "

## 2019-03-03 NOTE — PROGRESS NOTES
[facility]  Hospitalist Progress Note        Hayden Epperson, DO  03/03/2019        Interval History:      Patient doing well today, discussed with nursing staff.          Assessment and Plan:        58 year-old male with a history of TBI with subsequent hemiplegia, panhypopituitarism and feeding tube in place complicated by recurrent aspiration pneumonia who was just discharged from the hospital on 2/22/19 for it who presented to the ED on 2/27/19 with a fever and CXR concerning for recurrence of his aspiration pneumonia.     Recurrent aspiration pneumonia  Sepsis due to above Just discharged from the hospital on 2/22/19 with Augmentin for recurrent aspiration pneumonia. Day of admission had began to have increasing temperatures so mother called EMS. Here the patient was noted to have a fever of 102.4 F and a WBC of 16.0. CXR here with stable to slightly increased opacity at the right lung base.  Lactate was elevated at 4.2, WBC elevated to 16.4.  Influenza A/B negative.  UA unremarkable.  Patient had episodes of emesis day of admission as well as chronic difficulties with secretion management.  At this point not clear that presentation represents new bacterial pneumonia versus chemical/secretion aspiration pneumonitis.  Note his mother has questioned if the reason for his presentation is because of inadequate treatment with his last pneumonia, given his improvement to baseline on prior antibiotic course with new acute decompensation highly doubt that this is the case.  - NPO except for tube feeds  - Speech evaluation   - Blood cultures NGTD  - Continue piperacillin-tazobactam IV and vancomycin IV.   - Glycopyrrolate PRN for secretions; see PCP note from 2/26/19 for summary of secretion management     H/o TBI  Hemiplegia   Panhypopituitarism   Seizure disorder   Stable at this time.  No signs of adrenal insufficiency at this time.  - PTA Brivaracetam, Tegretol  - PTA Synthroid 137 mcg  - Day 3  "of 3 for stress dose steroids, resume normal regimen 3/2/19      Advance care planning  Extended discussion had with mother via phone 2/28/19, given this is his third hospitalization for the same issue in 1 month.  Discussed that despite best efforts to prevent this, including multiple attempts at adjusting his secretion management by his primary care provider, and GJ tube feeds that he continues to have aspiration events leading to hospitalization and at times significant illness ([facility] [place] Discussed that even should he recover from this illness, it is a question of \"when\" not \"if\" he has his next event and is re-hospitalized.  Introduced role of palliative care.  After discussion she remained adamant that she is \"not going to give up on him and let him die.\" Discussed that at any time he could have an even more severe event, that could potentially result in an ICU admission, breathing tube or death. She stated \"well he's not there now.\" Of note she remains hopeful that his aspiration will be solved by returning to eating by mouth. Note that he has had multiple SLP evaluations, most recently 12/18/18 with the assessment at that time that he would have no further recovery.   - SLP consulted this admission, not appropriate for bedside swallow assessment, referred to 12/18/18 note and signed off  - Despite his recurrent admissions, he appears to have relatively rapid recovery to baseline.   - Goals remain restorative at this time, full code  - Palliative care consult not placed as mother was not open to discuss advanced care planning further with them this admission     Penile pressure injury, present on admission  - Wound RN consulted, cares per wound RN  - Wound RN also assessed coccyx, reddened, but no active pressure injury     DVT Prophylaxis: Enoxaparin (Lovenox) subcutaneous     Code: Full Code       Disposition: Discharge today or tomorrow pending transition to oral antibiotics and discharge location " verified.                    Physical Exam:      Heart Rate: 62    Blood pressure 135/61, pulse 92, temperature 96.2  F (35.7  C), temperature source Oral, resp. rate 17, weight 71.9 kg (158 lb 9.6 oz), SpO2 97 %.    Vitals:    19 0614 19 0637 19 0314   Weight: 75.5 kg (166 lb 8 oz) 75.7 kg (166 lb 14.4 oz) 71.9 kg (158 lb 9.6 oz)       Vital Sign Ranges  Temperature Temp  Av.7  F (35.9  C)  Min: 95.7  F (35.4  C)  Max: 98.7  F (37.1  C)   Blood pressure Systolic (24hrs), Av , Min:128 , Max:138        Diastolic (24hrs), Av, Min:57, Max:70      Pulse No Data Recorded   Respirations Resp  Av.8  Min: 16  Max: 18   Pulse oximetry SpO2  Av %  Min: 95 %  Max: 97 %     Vital Signs with Ranges  Temp:  [95.7  F (35.4  C)-98.7  F (37.1  C)] 96.2  F (35.7  C)  Heart Rate:  [54-85] 62  Resp:  [16-18] 17  BP: (128-138)/(57-70) 135/61  SpO2:  [95 %-97 %] 97 %    I/O Last 3 Shifts:   I/O last 3 completed shifts:  In: 900 [NG/GT:300]  Out: -     I/O past 24 hours:     Intake/Output Summary (Last 24 hours) at 3/3/2019 0941  Last data filed at 3/2/2019 2100  Gross per 24 hour   Intake 900 ml   Output --   Net 900 ml     GENERAL: Alert. NAD. Frequently smiling and giving thumbs up sign. Pleasant.   HEENT: Normocephalic. EOMI. No icterus or injection. Nares normal.   LUNGS: Clear, minimal decrement to base. No dyspnea at rest.   HEART: Regular rate. Extremities perfused.   ABDOMEN: Soft, nontender, and nondistended. Positive bowel sounds.   EXTREMITIES: No LE edema noted.   NEUROLOGIC: Follows commands.          Prior to Admission Medications:        Medications Prior to Admission   Medication Sig Dispense Refill Last Dose     acetaminophen (TYLENOL) 500 MG tablet 1,000 mg by Oral or FT or NG tube route every 6 hours as needed for mild pain   prn     albuterol (2.5 MG/3ML) 0.083% neb solution Take 1 vial by nebulization every 4 hours as needed for shortness of breath / dyspnea or wheezing   prn      [] amoxicillin-clavulanate (AUGMENTIN) 875-125 MG tablet 1 tablet by Per Feeding Tube route every 12 hours for 7 days 14 tablet 0 2019 at x2     bacitracin ointment Apply topically daily To PEG site.    2019 at am     Brivaracetam (BRIVIACT) 10 MG/ML solution 100 mg by Oral or FT or NG tube route 2 times daily @0900 and 2100   2019 at x2     calcium carbonate 1250 (500 CA) MG/5ML SUSP suspension 5 mLs (1,250 mg) by Per J Tube route 3 times daily (with meals) (Patient taking differently: by Per J Tube route 3 times daily (with meals) ) 450 mL  2019 at x3     carBAMazepine (TEGRETOL) 100 MG/5ML suspension Take 150 mg by mouth every 6 hours At 06:00, 12:00, 18:00 and 24:00 for seizures   2019 at x2     hydrocortisone (CORTEF) 5 MG tablet 10 mg by Oral or FT or NG tube route daily (with dinner) At 1500   2019 at Unknown time     hydrocortisone (CORTEF) 5 MG tablet 20 mg by Oral or FT or NG tube route every morning    2019 at Unknown time     levothyroxine (SYNTHROID/LEVOTHROID) 137 MCG tablet 137 mcg by Oral or FT or NG tube route daily   2019 at Unknown time     melatonin (MELATONIN) 1 MG/ML LIQD liquid 6 mg by Per NG tube route At Bedtime    2019 at Unknown time     Multiple Vitamins-Minerals (CENTRUM SILVER) per tablet Take 1 tablet by mouth daily Crush and feed via j-tube   2019 at Unknown time     pantoprazole (PROTONIX) 2 mg/mL SUSP suspension 20 mg by Per NG tube route 2 times daily    2019 at x2     potassium & sodium phosphates (NEUTRA-PHOS) 280-160-250 MG Packet Take 1 packet by mouth 3 times daily 0900, 1500, 2100.    2019 at x3     saccharomyces boulardii (FLORASTOR) 250 MG capsule 250 mg by Oral or Feeding Tube route daily   2019 at Unknown time     scopolamine (TRANSDERM-SCOP, 1.5 MG,) 1 MG/3DAYS 72 hr patch Place 1 patch onto the skin every 72 hours   not on now     testosterone cypionate (DEPOTESTOTERONE CYPIONATE) 200 MG/ML  injection Inject 76 mg into the muscle See Admin Instructions Every 2 weeks on Fridays   76 mg or 0.38 mL   2/15/2019     Vitamin D, Cholecalciferol, 1000 units TABS Take 2,000 Units by mouth every morning Crush and feed via j-tube   2/27/2019 at Unknown time     Wheat Dextrin (BENEFIBER) POWD 2 teaspoonful by Per NG tube route daily    2/27/2019 at Unknown time            Medications:        Current Facility-Administered Medications   Medication Last Rate     IV fluid REPLACEMENT ONLY       Current Facility-Administered Medications   Medication Dose Route Frequency     Brivaracetam  100 mg Oral BID     carBAMazepine  150 mg Oral Q6H     enoxaparin  40 mg Subcutaneous Q24H     hydrocortisone  10 mg Oral Daily with supper     hydrocortisone  20 mg Oral Daily     levothyroxine  137 mcg Oral Daily     pantoprazole  20 mg Per NG tube BID     piperacillin-tazobactam  4.5 g Intravenous Q6H     potassium & sodium phosphates  1 packet Oral TID     saccharomyces boulardii  250 mg Oral BID     sodium chloride (PF)  3 mL Intracatheter Q8H     Current Facility-Administered Medications   Medication Dose Route Frequency     acetaminophen  650 mg Rectal Q4H PRN     acetaminophen  650 mg Oral Q4H PRN     IV fluid REPLACEMENT ONLY   Intravenous Continuous PRN     glycopyrrolate  1-2 mg Oral BID PRN     lidocaine 4%   Topical Q1H PRN     lidocaine (buffered or not buffered)  0.1-1 mL Other Q1H PRN     melatonin  1 mg Oral At Bedtime PRN     naloxone  0.1-0.4 mg Intravenous Q2 Min PRN     nitroGLYcerin  0.4 mg Sublingual Q5 Min PRN     ondansetron  4 mg Oral Q6H PRN    Or     ondansetron  4 mg Intravenous Q6H PRN     sodium chloride (PF)  3 mL Intracatheter q1 min prn            Data:      Lab data reviewed.   Recent Labs   Lab 03/03/19  0734 03/02/19  0720 03/01/19  0737 02/28/19  0532   HGB 12.4*  --  11.3* 11.0*   MCV 84  --  84 82    230 218 222     --  144 139   POTASSIUM 3.2*  --  3.6 4.8   CHLORIDE 106  --  110*  109   CO2 27  --  27 27   BUN 16  --  12 13   CR 0.95 0.92 0.83 0.74   ANIONGAP 7  --  7 3   STEVE 8.6  --  8.4* 7.9*   *  --  84 129*           Imaging:      Imaging data reviewed.     JOHN FamO.  [facility] Center[facility]  Pager [phone]

## 2019-03-03 NOTE — CONSULTS
Care Transition Initial Assessment - SW     Met with: chart review   Active Problems:    Aspiration pneumonia (H)       DATA  Lives With: parent(s)   Quality of Family Relationships: supportive, involved  Description of Support System: Supportive, Involved  Who is your support system?: Parent(s)  Support Assessment: Adequate family and caregiver support.   Identified issues/concerns regarding health management: SW following for d.c needs  Quality of Family Relationships: supportive, involved     ASSESSMENT  Cognitive Status: HECTOR  Concerns to be addressed: Patient is a 58 year old male who was admitted to the hospital for aspiration pneumonia. Prior to hospitalization, patient was living at home with his mother. Patient has a history of TBI with subsequent hemiplegia, panhypopituitarism.  It has been confirmed that his mother is patient's legal guardian. Patient prior to hospitalization was open to [facility] for RN/PT/SLP services. SW anticipates patient will d.c home with his mother and resumption of home care services.  SW will send resumption once d.c date is confirmed. SW will continue to follow and assist as needed.     PLAN  Financial costs for the patient includes   Patient given options and choices for discharge  Patient/family is agreeable to the plan?    Patient Goals and Preferences: [facility]  Patient anticipates discharging to:  Home with resumption HC    Elizabeth Salgado, BAYRON   *[phone]

## 2019-03-04 VITALS
RESPIRATION RATE: 15 BRPM | DIASTOLIC BLOOD PRESSURE: 65 MMHG | SYSTOLIC BLOOD PRESSURE: 135 MMHG | HEART RATE: 70 BPM | BODY MASS INDEX: 21.7 KG/M2 | TEMPERATURE: 95.8 F | WEIGHT: 160 LBS | OXYGEN SATURATION: 97 %

## 2019-03-04 LAB
ANION GAP SERPL CALCULATED.3IONS-SCNC: 5 MMOL/L (ref 3–14)
BUN SERPL-MCNC: 17 MG/DL (ref 7–30)
CALCIUM SERPL-MCNC: 8.4 MG/DL (ref 8.5–10.1)
CHLORIDE SERPL-SCNC: 106 MMOL/L (ref 94–109)
CO2 SERPL-SCNC: 27 MMOL/L (ref 20–32)
CREAT SERPL-MCNC: 0.98 MG/DL (ref 0.66–1.25)
ERYTHROCYTE [DISTWIDTH] IN BLOOD BY AUTOMATED COUNT: 14.2 % (ref 10–15)
GFR SERPL CREATININE-BSD FRML MDRD: 86 ML/MIN/{1.73_M2}
GLUCOSE SERPL-MCNC: 118 MG/DL (ref 70–99)
HCT VFR BLD AUTO: 38.5 % (ref 40–53)
HGB BLD-MCNC: 12.6 G/DL (ref 13.3–17.7)
MAGNESIUM SERPL-MCNC: 2.2 MG/DL (ref 1.6–2.3)
MCH RBC QN AUTO: 27 PG (ref 26.5–33)
MCHC RBC AUTO-ENTMCNC: 32.7 G/DL (ref 31.5–36.5)
MCV RBC AUTO: 82 FL (ref 78–100)
PHOSPHATE SERPL-MCNC: 3.5 MG/DL (ref 2.5–4.5)
PLATELET # BLD AUTO: 239 10E9/L (ref 150–450)
POTASSIUM SERPL-SCNC: 3.6 MMOL/L (ref 3.4–5.3)
PREALB SERPL IA-MCNC: 20 MG/DL (ref 15–45)
RBC # BLD AUTO: 4.67 10E12/L (ref 4.4–5.9)
SODIUM SERPL-SCNC: 138 MMOL/L (ref 133–144)
WBC # BLD AUTO: 10.4 10E9/L (ref 4–11)

## 2019-03-04 PROCEDURE — A9270 NON-COVERED ITEM OR SERVICE: HCPCS | Mod: GY | Performed by: HOSPITALIST

## 2019-03-04 PROCEDURE — 84134 ASSAY OF PREALBUMIN: CPT | Performed by: INTERNAL MEDICINE

## 2019-03-04 PROCEDURE — A9270 NON-COVERED ITEM OR SERVICE: HCPCS | Mod: GY | Performed by: INTERNAL MEDICINE

## 2019-03-04 PROCEDURE — 84100 ASSAY OF PHOSPHORUS: CPT | Performed by: INTERNAL MEDICINE

## 2019-03-04 PROCEDURE — 25000125 ZZHC RX 250: Performed by: INTERNAL MEDICINE

## 2019-03-04 PROCEDURE — 25000132 ZZH RX MED GY IP 250 OP 250 PS 637: Mod: GY | Performed by: INTERNAL MEDICINE

## 2019-03-04 PROCEDURE — 25000128 H RX IP 250 OP 636: Performed by: INTERNAL MEDICINE

## 2019-03-04 PROCEDURE — 83735 ASSAY OF MAGNESIUM: CPT | Performed by: INTERNAL MEDICINE

## 2019-03-04 PROCEDURE — 80048 BASIC METABOLIC PNL TOTAL CA: CPT | Performed by: INTERNAL MEDICINE

## 2019-03-04 PROCEDURE — 25000132 ZZH RX MED GY IP 250 OP 250 PS 637: Mod: GY | Performed by: HOSPITALIST

## 2019-03-04 PROCEDURE — 36415 COLL VENOUS BLD VENIPUNCTURE: CPT | Performed by: INTERNAL MEDICINE

## 2019-03-04 PROCEDURE — 85027 COMPLETE CBC AUTOMATED: CPT | Performed by: INTERNAL MEDICINE

## 2019-03-04 PROCEDURE — 99239 HOSP IP/OBS DSCHRG MGMT >30: CPT | Performed by: HOSPITALIST

## 2019-03-04 RX ORDER — MAGNESIUM SULFATE HEPTAHYDRATE 40 MG/ML
4 INJECTION, SOLUTION INTRAVENOUS EVERY 4 HOURS PRN
Status: DISCONTINUED | OUTPATIENT
Start: 2019-03-04 | End: 2019-03-04 | Stop reason: HOSPADM

## 2019-03-04 RX ORDER — POTASSIUM CHLORIDE 1500 MG/1
20-40 TABLET, EXTENDED RELEASE ORAL
Status: DISCONTINUED | OUTPATIENT
Start: 2019-03-04 | End: 2019-03-04 | Stop reason: HOSPADM

## 2019-03-04 RX ORDER — POTASSIUM CL/LIDO/0.9 % NACL 10MEQ/0.1L
10 INTRAVENOUS SOLUTION, PIGGYBACK (ML) INTRAVENOUS
Status: DISCONTINUED | OUTPATIENT
Start: 2019-03-04 | End: 2019-03-04 | Stop reason: HOSPADM

## 2019-03-04 RX ORDER — POTASSIUM CHLORIDE 1.5 G/1.58G
20-40 POWDER, FOR SOLUTION ORAL
Status: DISCONTINUED | OUTPATIENT
Start: 2019-03-04 | End: 2019-03-04 | Stop reason: HOSPADM

## 2019-03-04 RX ORDER — POTASSIUM CHLORIDE 7.45 MG/ML
10 INJECTION INTRAVENOUS
Status: DISCONTINUED | OUTPATIENT
Start: 2019-03-04 | End: 2019-03-04 | Stop reason: HOSPADM

## 2019-03-04 RX ORDER — POTASSIUM CHLORIDE 29.8 MG/ML
20 INJECTION INTRAVENOUS
Status: DISCONTINUED | OUTPATIENT
Start: 2019-03-04 | End: 2019-03-04 | Stop reason: HOSPADM

## 2019-03-04 RX ADMIN — Medication 250 MG: at 09:58

## 2019-03-04 RX ADMIN — PIPERACILLIN SODIUM,TAZOBACTAM SODIUM 4.5 G: 4; .5 INJECTION, POWDER, FOR SOLUTION INTRAVENOUS at 04:59

## 2019-03-04 RX ADMIN — ENOXAPARIN SODIUM 40 MG: 40 INJECTION SUBCUTANEOUS at 09:58

## 2019-03-04 RX ADMIN — LEVOTHYROXINE SODIUM 137 MCG: 112 TABLET ORAL at 05:36

## 2019-03-04 RX ADMIN — PIPERACILLIN SODIUM,TAZOBACTAM SODIUM 4.5 G: 4; .5 INJECTION, POWDER, FOR SOLUTION INTRAVENOUS at 09:58

## 2019-03-04 RX ADMIN — CARBAMAZEPINE 150 MG: 100 SUSPENSION ORAL at 11:18

## 2019-03-04 RX ADMIN — HYDROCORTISONE 20 MG: 20 TABLET ORAL at 09:59

## 2019-03-04 RX ADMIN — POTASSIUM & SODIUM PHOSPHATES POWDER PACK 280-160-250 MG 1 PACKET: 280-160-250 PACK at 09:58

## 2019-03-04 RX ADMIN — CARBAMAZEPINE 150 MG: 100 SUSPENSION ORAL at 05:36

## 2019-03-04 RX ADMIN — SODIUM BICARBONATE 20 MG: 84 INJECTION, SOLUTION INTRAVENOUS at 05:36

## 2019-03-04 RX ADMIN — BRIVARACETAM 100 MG: 10 SOLUTION ORAL at 09:58

## 2019-03-04 ASSESSMENT — ACTIVITIES OF DAILY LIVING (ADL)
ADLS_ACUITY_SCORE: 44

## 2019-03-04 NOTE — DISCHARGE SUMMARY
[facility]    Discharge Summary  Hospitalist    Date of Admission:  2/27/2019  Date of Discharge:  3/4/2019  Discharging Provider: Hayden Epperson  Date of Service (when I saw the patient): 03/04/19    History of Present Illness   Kyeon Farias is a 58 year old male ith a history of TBI with subsequent hemiplegia, panhypopituitarism and feeding tube in place complicated by recurrent aspiration pneumonia and was just discharged from the hospital on 2/22/19 for it who presented to the ED on 2/7/19 with a fever.  Mother reports this evening that the patient began to have increasing temperatures and by the time it was almost a 100 she called EMS to bring him in.  Upon arrival to the ED the patient was noted to be febrile at 102.4 degrees.  Mother has also noticed a continued wet sounding cough since discharge and 1-2 episodes of vomiting today.  No other abnormalities per mother.  Patient does not appear to be in pain or uncomfortable.       In the ED CXR showed slightly worsening opacity in the right lower lobe.  He was given IVF and Vanc/Zosyn.  Admitted for further evaluation     Hospital Course   Keyon Farias was admitted on 2/27/2019.  The following problems were addressed during his hospitalization:    58 year-old male with a history of TBI with subsequent hemiplegia, panhypopituitarism and feeding tube in place complicated by recurrent aspiration pneumonia who was just discharged from the hospital on 2/22/19 for it who presented to the ED on 2/27/19 with a fever and CXR concerning for recurrence of his aspiration pneumonia.     Recurrent aspiration pneumonia  Sepsis due to above Just discharged from the hospital on 2/22/19 with Augmentin for recurrent aspiration pneumonia. Day of admission had began to have increasing temperatures so mother called EMS. Here the patient was noted to have a fever of 102.4 F and a WBC of 16.0. CXR here with stable to slightly increased opacity at the right lung  "base.  Lactate was elevated at 4.2, WBC elevated to 16.4.  Influenza A/B negative.  UA unremarkable.  Patient had episodes of emesis day of admission as well as chronic difficulties with secretion management.  At this point not clear that presentation represents new bacterial pneumonia versus chemical/secretion aspiration pneumonitis.  Note his mother has questioned if the reason for his presentation is because of inadequate treatment with his last pneumonia, given his improvement to baseline on prior antibiotic course with new acute decompensation highly doubt that this is the case.  - Speech evaluation during inpatient stay.   - Blood cultures NGTD  - Continue augmentin course.   - Close outpatient follow up.      H/o TBI  Hemiplegia   Panhypopituitarism   Seizure disorder   Stable at this time.  No signs of adrenal insufficiency at this time.  - PTA Brivaracetam, Tegretol  - PTA Synthroid 137 mcg  - Day 3 of 3 for stress dose steroids during inpatient stay, resume normal regimen      Advance care planning  Extended discussion had with mother via phone 2/28/19, given this is his third hospitalization for the same issue in 1 month.  Discussed that despite best efforts to prevent this, including multiple attempts at adjusting his secretion management by his primary care provider, and GJ tube feeds that he continues to have aspiration events leading to hospitalization and at times significant illness ([facility] admission). Discussed that even should he recover from this illness, it is a question of \"when\" not \"if\" he has his next event and is re-hospitalized.  Introduced role of palliative care.  After discussion she remained adamant that she is \"not going to give up on him and let him die.\" Discussed that at any time he could have an even more severe event, that could potentially result in an ICU admission, breathing tube or death. She stated \"well he's not there now.\" Of note she remains hopeful that his aspiration will " be solved by returning to eating by mouth. Note that he has had multiple SLP evaluations, most recently 12/18/18 with the assessment at that time that he would have no further recovery.   - SLP consulted this admission, not appropriate for bedside swallow assessment, referred to 12/18/18 note and signed off  - Despite his recurrent admissions, he appears to have relatively recovery to baseline.   - Goals remain restorative at this time, full code  - Palliative care consult not placed as mother was not open to discuss advanced care planning further with them this admission     Penile pressure injury, present on admission  - Wound RN consulted, cares per wound RN  - Wound RN also assessed coccyx, reddened, but no active pressure injury     Pending Results   These results will be followed up by PCP  Unresulted Labs Ordered in the Past 30 Days of this Admission     Date and Time Order Name Status Description    2/27/2019 2137 Blood culture Preliminary     2/27/2019 2050 Blood culture Preliminary           Code Status   Full Code       Primary Care Physician   Carlos Gomez    Physical Exam   Temp: 95.8  F (35.4  C) Temp src: Axillary BP: 134/61   Heart Rate: 56 Resp: 18 SpO2: 98 % O2 Device: None (Room air)    Vitals:    03/02/19 0637 03/03/19 0314 03/04/19 0002   Weight: 75.7 kg (166 lb 14.4 oz) 71.9 kg (158 lb 9.6 oz) 72.6 kg (160 lb)     Vital Signs with Ranges  Temp:  [95.8  F (35.4  C)-97.8  F (36.6  C)] 95.8  F (35.4  C)  Heart Rate:  [56-87] 56  Resp:  [16-18] 18  BP: (134-135)/(61-64) 134/61  SpO2:  [97 %-98 %] 98 %  I/O last 3 completed shifts:  In: 1020 [NG/GT:420]  Out: -     GENERAL: Alert. NAD. Frequently smiling and giving thumbs up sign. Pleasant. Smiling.   HEENT: Normocephalic. EOMI. No icterus or injection. Nares normal.   LUNGS: Clear, minimal decrement to base. No dyspnea at rest.   HEART: Regular rate. Extremities perfused.   ABDOMEN: Soft, nontender, and nondistended. Positive bowel sounds.    EXTREMITIES: No LE edema noted.   NEUROLOGIC: Follows commands.         Discharge Disposition   Discharged to home  Condition at discharge: Stable    Consultations This Hospital Stay   PHARMACY TO DOSE VANCO  PHARMACY TO DOSE VANCO  SWALLOW EVAL SPEECH PATH AT BEDSIDE IP CONSULT  WOUND OSTOMY CONTINENCE NURSE  IP CONSULT  NUTRITION SERVICES ADULT IP CONSULT  PHARMACY IP CONSULT  SOCIAL WORK IP CONSULT    Time Spent on this Encounter   Hayden DYKES, personally saw the patient today and spent greater than 30 minutes discharging this patient.    Discharge Orders      Follow-up and recommended labs and tests     Post-hospital follow up appointment with PCP/ Dr Gomez on March 7th Thursday at 11AM  (Address: [address] [address]). Please call [phone]  if questions.     Reason for your hospital stay    Shortness of breath     Follow-up and recommended labs and tests     Follow up with primary care provider, Carlos Gomez, within 7 days for hospital follow- up.  The following labs/tests are recommended: cbc/bmp/cxr.    Follow up with home health.     Activity    Your activity upon discharge: activity as tolerated     MD face to face encounter    Documentation of Face to Face and Certification for Home Health Services    I certify that patient: Keyon Farias is under my care and that I, or a nurse practitioner or physician's assistant working with me, had a face-to-face encounter that meets the physician face-to-face encounter requirements with this patient on: 3/4/2019.    Resume home health care services.     Further, I certify that my clinical findings support that this patient is homebound (i.e. absences from home require considerable and taxing effort and are for medical reasons or [employer] services or infrequently or of short duration when for other reasons) because: Requires assistance of another person or specialized equipment to access medical services because patient: Requires  supervision of another for safe transfer...    Based on the above findings. I certify that this patient is confined to the home and needs intermittent skilled nursing care, physical therapy and/or speech therapy.  The patient is under my care, and I have initiated the establishment of the plan of care.  This patient will be followed by a physician who will periodically review the plan of care.  Physician/Provider to provide follow up care: Carlos Gomez    Attending hospital physician (the [facility]): Hayden Epperson  Physician Signature: See electronic signature associated with these discharge orders.  Date: 3/4/2019     Full Code     Diet    Follow this diet upon discharge: Orders Placed This Encounter      Adult Formula Drip Feeding: Specified Time Isosource 1.5; Jejunostomy; Goal Rate: 100; mL/hr; From: 7:00 AM; 7:00 PM; Medication - Feeding Tube Flush Frequency: At least 15-30 mL water before and after medication administration and with tube clogg...      Adult Formula Drip Feeding: Continuous Isosource 1.5; Jejunostomy; Goal Rate: 100; mL/hr; From: 7:00 AM; 7:00 PM; Medication -      Discharge Medications   Current Discharge Medication List      CONTINUE these medications which have CHANGED    Details   amoxicillin-clavulanate (AUGMENTIN) 875-125 MG tablet 1 tablet by Per Feeding Tube route every 12 hours for 5 days  Qty: 10 tablet, Refills: 0    Associated Diagnoses: Sepsis, due to unspecified organism (H)         CONTINUE these medications which have NOT CHANGED    Details   acetaminophen (TYLENOL) 500 MG tablet 1,000 mg by Oral or FT or NG tube route every 6 hours as needed for mild pain      albuterol (2.5 MG/3ML) 0.083% neb solution Take 1 vial by nebulization every 4 hours as needed for shortness of breath / dyspnea or wheezing      bacitracin ointment Apply topically daily To PEG site.       Brivaracetam (BRIVIACT) 10 MG/ML solution 100 mg by Oral or FT or NG tube route 2  times daily @0900 and 2100      calcium carbonate 1250 (500 CA) MG/5ML SUSP suspension 5 mLs (1,250 mg) by Per J Tube route 3 times daily (with meals)  Qty: 450 mL    Associated Diagnoses: Malnutrition (H)      carBAMazepine (TEGRETOL) 100 MG/5ML suspension Take 150 mg by mouth every 6 hours At 06:00, 12:00, 18:00 and 24:00 for seizures      !! hydrocortisone (CORTEF) 5 MG tablet 10 mg by Oral or FT or NG tube route daily (with dinner) At 1500      !! hydrocortisone (CORTEF) 5 MG tablet 20 mg by Oral or FT or NG tube route every morning       levothyroxine (SYNTHROID/LEVOTHROID) 137 MCG tablet 137 mcg by Oral or FT or NG tube route daily      melatonin (MELATONIN) 1 MG/ML LIQD liquid 6 mg by Per NG tube route At Bedtime       Multiple Vitamins-Minerals (CENTRUM SILVER) per tablet Take 1 tablet by mouth daily Crush and feed via j-tube      pantoprazole (PROTONIX) 2 mg/mL SUSP suspension 20 mg by Per NG tube route 2 times daily       potassium & sodium phosphates (NEUTRA-PHOS) 280-160-250 MG Packet Take 1 packet by mouth 3 times daily 0900, 1500, 2100.       saccharomyces boulardii (FLORASTOR) 250 MG capsule 250 mg by Oral or Feeding Tube route daily      scopolamine (TRANSDERM-SCOP, 1.5 MG,) 1 MG/3DAYS 72 hr patch Place 1 patch onto the skin every 72 hours      testosterone cypionate (DEPOTESTOTERONE CYPIONATE) 200 MG/ML injection Inject 76 mg into the muscle See Admin Instructions Every 2 weeks on Fridays   76 mg or 0.38 mL      Vitamin D, Cholecalciferol, 1000 units TABS Take 2,000 Units by mouth every morning Crush and feed via j-tube      Wheat Dextrin (BENEFIBER) POWD 2 teaspoonful by Per NG tube route daily        !! - Potential duplicate medications found. Please discuss with provider.        Allergies   Allergies   Allergen Reactions     Dilantin [Phenytoin Sodium]      Valproic Acid      Toxicity c bone marrow suspension, elevated ammonia levels      Data   Most Recent 3 CBC's:  Recent Labs   Lab Test  03/04/19  0721 03/03/19  0734 03/02/19  0720 03/01/19  0737   WBC 10.4 9.4  --  11.8*   HGB 12.6* 12.4*  --  11.3*   MCV 82 84  --  84    246 230 218      Most Recent 3 BMP's:  Recent Labs   Lab Test 03/04/19  0721 03/03/19  0734 03/02/19  0720 03/01/19  0737    140  --  144   POTASSIUM 3.6 3.2*  --  3.6   CHLORIDE 106 106  --  110*   CO2 27 27  --  27   BUN 17 16  --  12   CR 0.98 0.95 0.92 0.83   ANIONGAP 5 7  --  7   STEVE 8.4* 8.6  --  8.4*   * 133*  --  84     Most Recent 2 LFT's:  Recent Labs   Lab Test 02/27/19 2048 02/20/19  0220   AST 17 19   ALT 28 27   ALKPHOS 99 88   BILITOTAL 0.2 0.3     Most Recent INR's and Anticoagulation Dosing History:  Anticoagulation Dose History     Recent Dosing and Labs Latest Ref Rng & Units 12/1/2016 1/22/2017 1/22/2017 1/23/2017 1/25/2017 1/30/2017 2/7/2017    INR 0.86 - 1.14 1.30(H) 2.48(H) 2.58(H) 1.53(H) 1.49(H) 1.32(H) 1.27(H)        Most Recent 3 Troponin's:  Recent Labs   Lab Test 01/22/17  0500 01/21/17  2348 01/21/17  1600   TROPI 0.017 0.025 0.028     Most Recent Cholesterol Panel:  Recent Labs   Lab Test 11/29/16  0430   TRIG 100     Most Recent 6 Bacteria Isolates From Any Culture (See EPIC Reports for Culture Details):  Recent Labs   Lab Test 02/27/19  2130 02/27/19 2048 02/21/19  1841 02/20/19  0340 02/20/19  0306 02/20/19  0220   CULT No growth after 5 days No growth after 5 days No growth No growth Cultured on the 1st day of incubation:  Staphylococcus auricularis  *  Critical Value/Significant Value, preliminary result only, called to and read back by   Mecca Ash RN. 2/21/19 @ 1700,JR    (Note)  POSITIVE for Staphylococci other than S.aureus, S.epidermidis and  S.lugdunensis, by [facility] multiplex nucleic acid test.  Coagulase-negative staphylococci are the most common venipuncture or  collection associated skin CONTAMINANTS grown in blood cultures.  Final identification and antimicrobial susceptibility testing will be  verified by  standard methods.    Specimen tested with Hiriigene multiplex, gram-positive blood culture  nucleic acid test for the following targets: Staph aureus, Staph  epidermidis, Staph lugdunensis, other Staph species, Enterococcus  faecalis, Enterococcus faecium, Streptococcus species, S. agalactiae,  S. anginosus grp., S. pneumoniae, S. pyogenes, Listeria sp., mecA  (methicillin resistance) and Alberto/B (vancomycin resistance).    Critical Value/Significant Value called to and read back by Mecca Ash Rn @ 1940 2.21.19 CS     No growth     Most Recent TSH, T4 and A1c Labs:  Recent Labs   Lab Test 11/22/18  1438 07/05/18  0021   TSH  --  <0.01*   T4  --  1.66*   A1C 6.3*  --      Results for orders placed or performed during the hospital encounter of 02/27/19   XR Chest 2 Views    Narrative    CHEST TWO VIEWS 2/27/2019 9:29 PM     HISTORY: Fever, recent pneumonia    COMPARISON: February 20, 2019     FINDINGS: Elevated right hemidiaphragm. Stable to slight increase in  minimal right base infiltrate. The cardiac silhouette is stable.  Pulmonary vasculature is unremarkable.      Impression    IMPRESSION: Stable to perhaps slight increase in right base  infiltrate, elevated right hemidiaphragm.    BERT ESPINOZA MD

## 2019-03-04 NOTE — PLAN OF CARE
A&Ox1. Hx of TBI. Answers to yes/no questions. Compliant with cares. Incontinence and wound care provided. Tolerates TF at 100ml/hr. Pt to be discharged to home today with resumption of home care. Pt's mother notified of discharge plans. "[employer] ride set up for transportation.

## 2019-03-04 NOTE — PROGRESS NOTES
7p-11p:  Alert, unable to assess orientation-non-verbal, nods yes/no. VSS.  Denies pain or nausea. NPO with daily feeding tube. Incontinent of bowel and bladder, coccyx red-no open areas, penis/scrotum red and moist-barrier WOC orders followed.  IV-SL. Total assist with ceiling lift. Turned and repositioned Q 2hrs.  Discharge tomorrow to mothers home with HC. Continue to monitor.

## 2019-03-04 NOTE — PLAN OF CARE
Alert but can not assess orientation, pt nonverbal only nods yes or no. VSS on RA. Denies pain. Incontinent of urine overnight, memo area red, followed WOC orders. PIV SL with intermittent antibiotics. Turned and repo q2h. Oral cares performed as tolerated. Total care

## 2019-03-04 NOTE — PROGRESS NOTES
CHE Discharge Note:    D/I:  Patient requesting stretcher transportation being arranged secondary to diagnosis of Hemiplegia and not having his wheelchair at the hospital.  CHE placed call to [facility] to arrange for a stretcher ride at 2:00 pm today.  PCS form completed, faxed to [facility] with facesheet and provided to [facility].    GILMER Perez, LGSW

## 2019-03-04 NOTE — DISCHARGE INSTRUCTIONS
Nutrition Support Enteral:  Type of Feeding Tube: 18 Fr GJ  Enteral Frequency:  12 hr day cycle  Enteral Regimen: Isosource 1.5 @ 100 mL/hr x 12 hrs (7am-7pm)  Total Enteral Provisions: 1800 cals (25 cals/kg), 82 gm pro (1.14 gm/kg), 211 gm CHO, 18 gm fiber, 912 mL free water, 100% RDI  Free Water Flush: 120 mL every 6 hrs

## 2019-03-04 NOTE — PROGRESS NOTES
Pt is alert and nonverbal but shows no signs of pain/discomfort. Pt to be discharge to home today at 1400. Pt VSS and tolerating tube feedings at 100mL/hr. Pt given IV Zosyn. No episodes of N/V. Pt up to chair today with assist of 2 and repositioned in bed.

## 2019-03-04 NOTE — PLAN OF CARE
Care Plan Summary Note: Non-verbal. VSS.  Denies pain or nausea. NPO with feeding tube. Flushed with 120mls. IV-SL. Total assist with ceiling lift. Turned and repositioned Q 2hrs. Incontinent of BB. discharge tomorrow home with family. Continue to monitor.

## 2019-03-04 NOTE — PROGRESS NOTES
Care Coordination:    Patient discharge to home today.  PCP appointments had been made prior.  Met with patient's guardian/mother Savannah.  Updated of PCP appointment on Thurs and she will be able to get him there.  She understands there will be resumption of home care.  Noted patient has discharge orders/plans for home care including Home (RN/OTSLP)  Patient was given choice in selecting homecare and chose [facility] (resumption)  Referral sent 3/4 and confirmed.  Provided contact information on AVS for pt to call if they have questions for [facility] [phone].  Savannah states that we need to set up transportation.  Pt does not have his w/c here and mother feels he needs to go by stretcher due to hemiplegia.  SW updated and arranging ride.           Alee Sousa RN BSN  Care Coordinator

## 2019-03-05 ENCOUNTER — APPOINTMENT (OUTPATIENT)
Dept: GENERAL RADIOLOGY | Facility: CLINIC | Age: 59
End: 2019-03-05
Attending: EMERGENCY MEDICINE
Payer: MEDICARE

## 2019-03-05 ENCOUNTER — HOSPITAL ENCOUNTER (EMERGENCY)
Facility: CLINIC | Age: 59
Discharge: HOME | End: 2019-03-06
Attending: EMERGENCY MEDICINE | Admitting: EMERGENCY MEDICINE
Payer: MEDICARE

## 2019-03-05 DIAGNOSIS — K94.20 COMPLICATION OF FEEDING TUBE (H): ICD-10-CM

## 2019-03-05 PROCEDURE — 40000986 XR ABDOMEN 2 VW

## 2019-03-05 PROCEDURE — 99283 EMERGENCY DEPT VISIT LOW MDM: CPT

## 2019-03-05 NOTE — ED AVS SNAPSHOT
Emergency Department  [address] [place] [address]  Phone:  [phone]  Fax:  [phone]                                    Keyon Farias   MRN: [id number]    Department:   Emergency Department   Date of Visit:  3/5/2019           After Visit Summary Signature Page    I have received my discharge instructions, and my questions have been answered. I have discussed any challenges I see with this plan with the nurse or doctor.    ..........................................................................................................................................  Patient/Patient Representative Signature      ..........................................................................................................................................  Patient Representative Print Name and Relationship to Patient    ..................................................               ................................................  Date                                   Time    ..........................................................................................................................................  Reviewed by Signature/Title    ...................................................              ..............................................  Date                                               Time          22EPIC Rev 08/18

## 2019-03-06 ENCOUNTER — APPOINTMENT (OUTPATIENT)
Dept: GENERAL RADIOLOGY | Facility: CLINIC | Age: 59
End: 2019-03-06
Attending: EMERGENCY MEDICINE
Payer: MEDICARE

## 2019-03-06 VITALS
DIASTOLIC BLOOD PRESSURE: 74 MMHG | OXYGEN SATURATION: 98 % | RESPIRATION RATE: 18 BRPM | SYSTOLIC BLOOD PRESSURE: 132 MMHG | TEMPERATURE: 98 F

## 2019-03-06 PROCEDURE — 40000986 XR ABDOMEN 1 VW

## 2019-03-06 NOTE — DISCHARGE INSTRUCTIONS
It is ok to use the temporary feeding tube that goes into the stomach (Gastrostomy tube). The interventional radiologists should replaced his GJ-tube (gastrojejunostomy tube) in the next 1-2 days. It is ok to give all medications, water through the temporary feeding tube. An order was placed in the system to have the tube replaced. Call first thing in the morning to have this scheduled. If you have difficulty with scheduling call your primary care physician or return to the Emergency Department during normal business hours 7am-4pm.

## 2019-03-06 NOTE — ED TRIAGE NOTES
Pt's mother and guardian contacted and informed by at [facility] ED for feeding tube evaluation.

## 2019-03-06 NOTE — ED PROVIDER NOTES
"  History     Chief Complaint:  JG-Tube Problem    The history is provided by a caregiver.      Keyon Farias is a 58 year old male with a history of necrotizing pancreatitis who presents for evaluation of a JG-tube (18 Urdu) problem. The patients caregiver reports noticing fluid leaking around his JG-tube since about 1 hour prior to presentation. She describes this fluid as yellow/tan and \"like bile\". The patient has continuous feeds from 7a-5p with a pump via the jejunostomy port and receives his medications via the gastric port. He has a reported history of silent aspiration with gastric feeding. Patient did receive his normal feeding today, and the tube had been working normally. Patient's caretaker denies noticing vomiting, blood from the g-tube, or other complaint. She feels he has not exhibited any pain. Patient's last BM was tonight at around 6:00 PM and this more runny than normal; wife states that this is because he is on antibiotics for aspiration pneumonia. She notes that his breathing, coughing, and sputum production are improving since hospital discharge. He was admitted for this initially on 2/27/2019 and just discharged yesterday. No fever.    Allergies:  Dilantin [Phenytoin Sodium]  Valproic Acid      Medications:    Cortef  Levothyroxine  Melatonin  Protonix  Testosterone  Cholecalciferol     Past Medical History:    Appendicitis  Panhypopituitarism  Seizures  Sepsis  Pneumonia  Intractable epilepsy  PEG tube malfunction  Cardiac arrest  Necrotizing pancreatitis  Encephalopathy  Spastic hemiplegia affecting dominant side  TBI  Tracheostomy care  UTI  Ventricular fibrillation  Ventricular tachyarrhythmia    Past Surgical History:    Endoscopic US  EGD combined (3x)  Reconstruction facial surgery  IR gastro jejunostomy tube change  Laparoscopic appendectomy  Right hand repair  Tracheostomy (2x)  Vascular surgery    Family History:   History reviewed. No pertinent family history.      Social " "History:  Presents with caretaker   Tobacco use: Former smoker (quit 30 years ago)  Alcohol use: No  PCP: Carlos Gomez    Marital Status:  Single      Review of Systems   All other systems reviewed and are negative.    Physical Exam     Patient Vitals for the past 24 hrs:   BP Temp Temp src Heart Rate SpO2   03/05/19 2138 138/82 98  F (36.7  C) Oral 75 99 %        Physical Exam  General: Well appearing, smiling, interactive, giving \"thumbs up.\" Resting comfortably  Head:  Scalp, face, and head appear normal  Eyes:  Pupils are equal, round    Conjunctivae non-injected and sclerae white  ENT:    The external nose is normal    Pinnae are normal  Neck:  Normal range of motion    Trachea is in the midline  CV:  Regular rate and rhythm     Normal S1/S2, no S3/S4    No murmur or rub. Radial pulses 2+ bilaterally   Resp:  Lungs are clear and equal bilaterally    There is no tachypnea    No increased work of breathing    No rales, wheezing, or rhonchi  GI:  Abdomen is soft, no rigidity or guarding    No distension, or mass. Multiple well healed surgical scars.    LUQ GJ tube in place clearly leaking clear yellow thin gastric secretions from just distal to the level of the skin where the tube bumper is located. The tube has a fracture/break in it at this level.    No tenderness or rebound tenderness   MS:  Normal muscular tone    No lower extremity edema  Skin:  No rash or acute skin lesions noted  Neuro: Awake and alert    Moves all extremities spontaneously  Psych:  Calm and cooperative. Appears happy.    Emergency Department Course     Imaging:  Radiographic findings were communicated with the patient and family who voiced understanding of the findings.    XR Abdomen, 2 view:  IMPRESSION: A gastrojejunostomy tube is present with balloon in the stomach and distal tip in the proximal jejunum.    Imaging independently reviewed and agree with radiologist interpretation.     Procedure:    Gastrostomy Tube Replacement "       PREPARATION: Betadine      TUBE: 18 Kazakh Gastrostomy Feeding Tube    PROCEDURE: The patient was placed in a supine position. The area was prepped as above. I then inserted the tube through the G-tube site which passed easily. Stomach contents were easily aspirated and the balloon was inflated with 10 cc's of normal saline.    COMPLICATIONS: The patient tolerated the procedure well and there were no complications.     Emergency Department Course:  Past medical records, nursing notes, and vitals reviewed.  2255: I performed an exam of the patient and obtained history, as documented above.     The patient was sent for an abdominal x-ray while in the emergency department, findings above.    0055: I rechecked the patient. Performed a g-tube replacement procedure at this time. Findings and plan explained to the Patient. Patient discharged home with instructions regarding supportive care, medications, and reasons to return. The importance of close follow-up was reviewed.       Impression & Plan      Medical Decision Making:  Keyon Farias is a 58 year old male who presents for evaluation of JG tube leaking. Initial radiograph obtained which shows the tube in normal position. Upon further evaluation of the tube there is a clear fracture in the tube itself outside of the skin which is leaking. Given that this is a longstanding GJ tube with a well epithelialized track I discussed replacing the broken GJ tube with a temporary G tube so the patient can continue to receive his medications until the GJ tube could be replaced by IR. Unfortunately IR is not available overnight to do this. An 18F G tube was easily placed in the ED as noted above. Repeat radiographs demonstrate normal positioning of this. The tube is ready for immediate use. I discussed with the patient's caregiver that it is ok to give water and medications through the tube but given his history of aspiration I would hold his normal tube feeds until he can  have the GJ tube replaced by IR. An outpatient order for this was placed in Epic and a Staff Message sent to the IR physician on-call to help facilitate close follow up of this. The patient's caregiver was agreeable with the plan of care and the patient was discharged in improved condition.  No signs of cellulitis, perforation, mal-placement in soft tissues, etc.  Stable to home.  Return precautions were discussed with patients caregiver. Their questions were answered and were agreeable with discharge.     Diagnosis:    ICD-10-CM    1. Complication of feeding tube (H) K94.23 INT Gastro jejunostomy tube replacement       Disposition:  Discharged to home with plan as outlined.     Scribe Disclosure:  Les DYKES, am serving as a scribe at 10:58 PM on 3/5/2019 to document services personally performed by Srikanth Ryan MD based on my observations and the provider's statements to me.  3/5/2019   EMERGENCY DEPARTMENT      Srikanth Ryan MD  03/06/19 0743

## 2019-03-06 NOTE — ED NOTES
Bed: ED01  Expected date:   Expected time:   Means of arrival:   Comments:  Samira [age] M Gtube issues

## 2019-03-08 ENCOUNTER — APPOINTMENT (OUTPATIENT)
Dept: INTERVENTIONAL RADIOLOGY/VASCULAR | Facility: CLINIC | Age: 59
DRG: 872 | End: 2019-03-08
Attending: EMERGENCY MEDICINE
Payer: MEDICARE

## 2019-03-08 ENCOUNTER — HOSPITAL ENCOUNTER (OUTPATIENT)
Facility: CLINIC | Age: 59
Discharge: HOME | DRG: 872 | End: 2019-03-08
Attending: RADIOLOGY | Admitting: RADIOLOGY
Payer: MEDICARE

## 2019-03-08 VITALS
RESPIRATION RATE: 18 BRPM | TEMPERATURE: 96.1 F | SYSTOLIC BLOOD PRESSURE: 122 MMHG | DIASTOLIC BLOOD PRESSURE: 76 MMHG | OXYGEN SATURATION: 96 %

## 2019-03-08 DIAGNOSIS — K94.20 COMPLICATION OF FEEDING TUBE (H): ICD-10-CM

## 2019-03-08 PROCEDURE — 27210905 ZZH KIT CR7

## 2019-03-08 PROCEDURE — C1769 GUIDE WIRE: HCPCS

## 2019-03-08 PROCEDURE — 27210815 ZZ H TUBE GASTRO CR13

## 2019-03-08 PROCEDURE — 27210742 ZZH CATH CR1

## 2019-03-08 PROCEDURE — 49452 REPLACE G-J TUBE PERC: CPT

## 2019-03-08 PROCEDURE — 40000854 ZZH STATISTIC SIMPLE TUBE INSERTION/CHARGE, PORT, CATH, FISTULOGRAM

## 2019-03-08 RX ORDER — DEXTROSE MONOHYDRATE 25 G/50ML
25-50 INJECTION, SOLUTION INTRAVENOUS
Status: DISCONTINUED | OUTPATIENT
Start: 2019-03-08 | End: 2019-03-08 | Stop reason: HOSPADM

## 2019-03-08 RX ORDER — NICOTINE POLACRILEX 4 MG
15-30 LOZENGE BUCCAL
Status: DISCONTINUED | OUTPATIENT
Start: 2019-03-08 | End: 2019-03-08 | Stop reason: HOSPADM

## 2019-03-08 NOTE — DISCHARGE SUMMARY
Patient discharged to home by wheelchair at 2:03 PM 03/08/19.  Medication regimen discussed with patient and patient verbalizes understanding.  Diet and activity and wound care discussed with patient.  Upcoming appointments reviewed.  No questions at this time. GJ tube site CDI, no swelling, bleeding or leaking.

## 2019-03-08 NOTE — IP AVS SNAPSHOT
[place] [address] [place] [address]  Phone:  [phone]                                    After Visit Summary   3/8/2019    Keyon Farias    MRN: [id number]           After Visit Summary Signature Page    I have received my discharge instructions, and my questions have been answered. I have discussed any challenges I see with this plan with the nurse or doctor.    ..........................................................................................................................................  Patient/Patient Representative Signature      ..........................................................................................................................................  Patient Representative Print Name and Relationship to Patient    ..................................................               ................................................  Date                                   Time    ..........................................................................................................................................  Reviewed by Signature/Title    ...................................................              ..............................................  Date                                               Time          22EPIC Rev 08/18

## 2019-03-08 NOTE — IP AVS SNAPSHOT
MRN:[id number]                      After Visit Summary   3/8/2019    Keyon Farias    MRN: [id number]           Visit Information        Department      3/8/2019 11:56 AM [facility]          Review of your medicines      UNREVIEWED medicines. Ask your doctor about these medicines       Dose / Directions   acetaminophen 500 MG tablet  Commonly known as:  TYLENOL      Dose:  1000 mg  1,000 mg by Oral or FT or NG tube route every 6 hours as needed for mild pain  Refills:  0     albuterol (2.5 MG/3ML) 0.083% neb solution  Commonly known as:  PROVENTIL      Dose:  1 vial  Take 1 vial by nebulization every 4 hours as needed for shortness of breath / dyspnea or wheezing  Refills:  0     amoxicillin-clavulanate 875-125 MG tablet  Commonly known as:  AUGMENTIN  Indication:  Pneumonia caused by Inhaling a Substance Into the Lungs  Used for:  Sepsis, due to unspecified organism (H)      Dose:  1 tablet  1 tablet by Per Feeding Tube route every 12 hours for 5 days  Quantity:  10 tablet  Refills:  0     bacitracin 500 UNIT/GM external ointment      Apply topically daily To PEG site.  Refills:  0     BENEFIBER Powd      Dose:  2 teaspoonful  2 teaspoonful by Per NG tube route daily  Refills:  0     BRIVIACT 10 MG/ML solution  Generic drug:  Brivaracetam      Dose:  100 mg  100 mg by Oral or FT or NG tube route 2 times daily @0900 and 2100  Refills:  0     calcium carbonate 1250 (500 Ca) MG/5ML Susp suspension  Used for:  Malnutrition (H)      Dose:  1250 mg  5 mLs (1,250 mg) by Per J Tube route 3 times daily (with meals)  Quantity:  450 mL  Refills:  0     carBAMazepine 100 MG/5ML suspension  Commonly known as:  TEGretol      Dose:  150 mg  Take 150 mg by mouth every 6 hours At 06:00, 12:00, 18:00 and 24:00 for seizures  Refills:  0     * hydrocortisone 5 MG tablet  Commonly known as:  CORTEF      Dose:  10 mg  10 mg by Oral or FT or NG tube route daily (with dinner) At 1500  Refills:  0      * hydrocortisone 5 MG tablet  Commonly known as:  CORTEF      Dose:  20 mg  20 mg by Oral or FT or NG tube route every morning  Refills:  0     levothyroxine 137 MCG tablet  Commonly known as:  SYNTHROID/LEVOTHROID      Dose:  137 mcg  137 mcg by Oral or FT or NG tube route daily  Refills:  0     melatonin 1 MG/ML Liqd liquid      Dose:  6 mg  6 mg by Per NG tube route At Bedtime  Refills:  0     multivitamin tablet      Dose:  1 tablet  Take 1 tablet by mouth daily Crush and feed via j-tube  Refills:  0     pantoprazole 2 mg/mL Susp suspension  Commonly known as:  PROTONIX      Dose:  20 mg  20 mg by Per NG tube route 2 times daily  Refills:  0     potassium & sodium phosphates 280-160-250 MG Packet  Commonly known as:  NEUTRA-PHOS      Dose:  1 packet  Take 1 packet by mouth 3 times daily 0900, 1500, 2100.  Refills:  0     saccharomyces boulardii 250 MG capsule  Commonly known as:  FLORASTOR      Dose:  250 mg  250 mg by Oral or Feeding Tube route daily  Refills:  0     testosterone cypionate 200 MG/ML injection  Commonly known as:  DEPOTESTOSTERONE      Dose:  76 mg  Inject 76 mg into the muscle See Admin Instructions Every 2 weeks on Fridays   76 mg or 0.38 mL  Refills:  0     TRANSDERM-SCOP (1.5 MG) 1 MG/3DAYS 72 hr patch  Generic drug:  scopolamine      Dose:  1 patch  Place 1 patch onto the skin every 72 hours  Refills:  0     Vitamin D (Cholecalciferol) 1000 units Tabs      Dose:  2000 Units  Take 2,000 Units by mouth every morning Crush and feed via j-tube  Refills:  0         * This list has 2 medication(s) that are the same as other medications prescribed for you. Read the directions carefully, and ask your doctor or other care provider to review them with you.                  Protect others around you: Learn how to safely use, store and throw away your medicines at www.disposemymeds.org.       Follow-ups after your visit       Care Instructions       Further instructions from your care team        G-tube Exchange Discharge Instructions     After you go home:      You may resume your normal diet    Care of Insertion Site:      For the first 48 hrs, check your puncture site every couple hours while you are awake     You may remove/change the dressing tomorrow    You may shower tomorrow    No tub baths, whirlpools or swimming until your puncture site has fully healed     Activity       You may go back to normal activity in 24 hours    Wait 48 hours before lifting, straining, exercise or other strenuous activity    Medicines:      You may resume all medications    Resume your Warfarin/Coumadin at your regular dose today. Follow up with your provider to have your INR rechecked    Resume your Platelet Inhibitors and Aspirin tomorrow at your regular dose    For minor pain, you may take Acetaminophen (Tylenol) or Ibuprofen (Advil)                 Call the provider who ordered this procedure if:      Blood or fluid is draining from the site    The site is red, swollen, hot, tender or there is foul-smelling drainage    Chills or a fever greater than 101 F (38 C)    Increased pain at the site    Any questions or concerns    Call  911 or go to the Emergency Room if:      Severe pain or trouble breathing    Bleeding that you cannot control      If you have questions call:          [facility] Radiology Dept @ [phone]        The provider who performed your procedure was _________________.        Caring for your G-tube    Tube Maintenance:    Possible problems with your tube may include:      Clogged with medications or feedings - most obstructions can be cleared with a small (3cc) syringe and warm water. This may be repeated until the tube is unclogged. This can be prevented by frequently flushing the tube with water (60cc) during the day and always after medications & feedings.      Tube pulls out or falls out -cover the opening with gauze & tape. Call [phone] for further instructions      Skin  breakdown and/or yeast infections at the insertion site - use of skin barrier ointments and anti-fungal powders can treat most site irritations.  Ask your pharmacist or provider for assistance (a prescription is not necessary).    In general, tube problems (including pulled tubes) are NOT emergency situations. Unless the pulling out of a tube is accompanied by uncontrollable bleeding, please DO NOT GO TO THE EMERGENCY ROOM!  Call [phone] with problems.    Tube Care:      Change the gauze dressing every 24 hours and if soiled (dirty).  Stabilize all tubes securely by using gauze and tape.  Clean tube site with soap & water using a cotton applicator (Q-tip) as needed to prevent irritation.    Flush feeding tube with 60cc of warm or room temperature water before and after meds.  To prevent the tube from clogging, ask your provider or pharmacist if liquid forms of your medications are available. If not, crush the pills well & be sure to flush the tube before & after all medications.    Flush feeding tube a minimum of every 4 hours and when feeding is completed with 60cc of water to keep the tube clear and avoid clogging.    Pt may use an abdominal (waist) binder to protect the G-tube.    If there is continued oozing or bleeding, redness, yellow/green/foul smelling drainage    STOP the feedings & use of the tube immediately if there is:      Continued oozing or bleeding at the site    Redness    Yellow/green or foul smelling drainage at the site    Uncontrolled stomach pain    Many of the supplies mentioned above can be purchased at your local pharmacy      For issues with your tube, please call:    [facility] at [phone]                Additional Information About Your Visit       MyChart Information    Robert Applebaum MDhart lets you send messages to your doctor, view your test results, renew your prescriptions, schedule appointments and more. To sign up, go to [email or web address] . Click on  "\"Log in\" on the left side of the screen, which will take you to the Welcome page. Then click on \"Sign up Now\" on the right side of the page.     You will be asked to enter the access code listed below, as well as some personal information. Please follow the directions to create your username and password.     Your access code is: [email or web address]  Expires: 3/17/2019  3:48 PM     Your access code will  in 90 days. If you need help or a new code, please call your [facility] clinic or [phone].       Care EveryWhere ID    This is your Care EveryWhere ID. This could be used by other organizations to access your [facility] medical records  [id number]       Your Vitals Were     Blood Pressure   118/74    Temperature   96.1  F (35.6  C) (Axillary)    Respirations   18    Pulse Oximetry   97%           Primary Care Provider Office Phone # Fax #    Carlos Gomez [phone]      Equal Access to Services    [facility]: Hadii aad ku hadasho Soomaali, waaxda luqadaha, qaybta kaalmada adeegyada, yaw moreland . So [facility] [phone].    ATENCIÓN: Si habla español, tiene a king disposición servicios gratuitos de asistencia lingüística. Marsha al [phone].    We comply with applicable federal civil rights laws and [place] laws. We do not discriminate on the basis of race, color, national origin, age, disability, sex, sexual orientation, or gender identity.           Thank you!    Thank you for choosing [facility] for your care. Our goal is always to provide you with excellent care. Hearing back from our patients is one way we can continue to improve our services. Please take a few minutes to complete the written survey that you may receive in the mail after you visit with us. Thank you!            Medication List      ASK your doctor about these medications          Morning Afternoon Evening Bedtime As Needed    acetaminophen 500 MG tablet  Also known as:  " TYLENOL  INSTRUCTIONS:  1,000 mg by Oral or FT or NG tube route every 6 hours as needed for mild pain                     albuterol (2.5 MG/3ML) 0.083% neb solution  Also known as:  PROVENTIL  INSTRUCTIONS:  Take 1 vial by nebulization every 4 hours as needed for shortness of breath / dyspnea or wheezing                     amoxicillin-clavulanate 875-125 MG tablet  Also known as:  AUGMENTIN  INSTRUCTIONS:  1 tablet by Per Feeding Tube route every 12 hours for 5 days  Reason for med:  Pneumonia caused by Inhaling a Substance Into the Lungs                     bacitracin 500 UNIT/GM external ointment  INSTRUCTIONS:  Apply topically daily To PEG site.                     BENEFIBER Powd  INSTRUCTIONS:  2 teaspoonful by Per NG tube route daily                     BRIVIACT 10 MG/ML solution  INSTRUCTIONS:  100 mg by Oral or FT or NG tube route 2 times daily @0900 and 2100  Generic drug:  Brivaracetam                     calcium carbonate 1250 (500 Ca) MG/5ML Susp suspension  INSTRUCTIONS:  5 mLs (1,250 mg) by Per J Tube route 3 times daily (with meals)                     carBAMazepine 100 MG/5ML suspension  Also known as:  TEGretol  INSTRUCTIONS:  Take 150 mg by mouth every 6 hours At 06:00, 12:00, 18:00 and 24:00 for seizures                     * hydrocortisone 5 MG tablet  Also known as:  CORTEF  INSTRUCTIONS:  10 mg by Oral or FT or NG tube route daily (with dinner) At 1500                     * hydrocortisone 5 MG tablet  Also known as:  CORTEF  INSTRUCTIONS:  20 mg by Oral or FT or NG tube route every morning                     levothyroxine 137 MCG tablet  Also known as:  SYNTHROID/LEVOTHROID  INSTRUCTIONS:  137 mcg by Oral or FT or NG tube route daily                     melatonin 1 MG/ML Liqd liquid  INSTRUCTIONS:  6 mg by Per NG tube route At Bedtime                     multivitamin tablet  INSTRUCTIONS:  Take 1 tablet by mouth daily Crush and feed via j-tube                     pantoprazole 2 mg/mL Susp  suspension  Also known as:  PROTONIX  INSTRUCTIONS:  20 mg by Per NG tube route 2 times daily                     potassium & sodium phosphates 280-160-250 MG Packet  Also known as:  NEUTRA-PHOS  INSTRUCTIONS:  Take 1 packet by mouth 3 times daily 0900, 1500, 2100.                     saccharomyces boulardii 250 MG capsule  Also known as:  FLORASTOR  INSTRUCTIONS:  250 mg by Oral or Feeding Tube route daily                     testosterone cypionate 200 MG/ML injection  Also known as:  DEPOTESTOSTERONE  INSTRUCTIONS:  Inject 76 mg into the muscle See Admin Instructions Every 2 weeks on Fridays   76 mg or 0.38 mL                     TRANSDERM-SCOP (1.5 MG) 1 MG/3DAYS 72 hr patch  INSTRUCTIONS:  Place 1 patch onto the skin every 72 hours  Generic drug:  scopolamine                     Vitamin D (Cholecalciferol) 1000 units Tabs  INSTRUCTIONS:  Take 2,000 Units by mouth every morning Crush and feed via j-tube                        * This list has 2 medication(s) that are the same as other medications prescribed for you. Read the directions carefully, and ask your doctor or other care provider to review them with you.

## 2019-03-08 NOTE — PROGRESS NOTES
Interventional Radiology Intra-procedural Nursing Note    Patient Name: Keyon Farias  Medical Record Number: [id number]  Today's Date: March 8, 2019    Start Time: 1330  End of procedure time: 1340  Procedure: GJ Tube Replacement  Report given to: Eh CHRISTINE, Care Suites  Time pt departs:  1345      Other Notes:   Patient in IR 2 for GJ tube replacement. G tube placed in ED on 3/5/19 for broken GJ tube. Allergies, labs, and vital signs reviewed prior to procedure. Consent signed by mother Savannah.    18F GJ Tube replaced without complication at 1335. Patient tolerated procedure. Site C/D/I.     No sedation or medications given.     Anya Quiroga, RN

## 2019-03-08 NOTE — DISCHARGE INSTRUCTIONS
G-tube Exchange Discharge Instructions     After you go home:      You may resume your normal diet    Care of Insertion Site:      For the first 48 hrs, check your puncture site every couple hours while you are awake     You may remove/change the dressing tomorrow    You may shower tomorrow    No tub baths, whirlpools or swimming until your puncture site has fully healed     Activity       You may go back to normal activity in 24 hours    Wait 48 hours before lifting, straining, exercise or other strenuous activity    Medicines:      You may resume all medications    Resume your Warfarin/Coumadin at your regular dose today. Follow up with your provider to have your INR rechecked    Resume your Platelet Inhibitors and Aspirin tomorrow at your regular dose    For minor pain, you may take Acetaminophen (Tylenol) or Ibuprofen (Advil)                 Call the provider who ordered this procedure if:      Blood or fluid is draining from the site    The site is red, swollen, hot, tender or there is foul-smelling drainage    Chills or a fever greater than 101 F (38 C)    Increased pain at the site    Any questions or concerns    Call  911 or go to the Emergency Room if:      Severe pain or trouble breathing    Bleeding that you cannot control      If you have questions call:          [facility] Radiology Dept @ [phone]        The provider who performed your procedure was _________________.        Caring for your G-tube    Tube Maintenance:    Possible problems with your tube may include:      Clogged with medications or feedings - most obstructions can be cleared with a small (3cc) syringe and warm water. This may be repeated until the tube is unclogged. This can be prevented by frequently flushing the tube with water (60cc) during the day and always after medications & feedings.      Tube pulls out or falls out -cover the opening with gauze & tape. Call [phone] for further instructions      Skin breakdown  and/or yeast infections at the insertion site - use of skin barrier ointments and anti-fungal powders can treat most site irritations.  Ask your pharmacist or provider for assistance (a prescription is not necessary).    In general, tube problems (including pulled tubes) are NOT emergency situations. Unless the pulling out of a tube is accompanied by uncontrollable bleeding, please DO NOT GO TO THE EMERGENCY ROOM!  Call [phone] with problems.    Tube Care:      Change the gauze dressing every 24 hours and if soiled (dirty).  Stabilize all tubes securely by using gauze and tape.  Clean tube site with soap & water using a cotton applicator (Q-tip) as needed to prevent irritation.    Flush feeding tube with 60cc of warm or room temperature water before and after meds.  To prevent the tube from clogging, ask your provider or pharmacist if liquid forms of your medications are available. If not, crush the pills well & be sure to flush the tube before & after all medications.    Flush feeding tube a minimum of every 4 hours and when feeding is completed with 60cc of water to keep the tube clear and avoid clogging.    Pt may use an abdominal (waist) binder to protect the G-tube.    If there is continued oozing or bleeding, redness, yellow/green/foul smelling drainage    STOP the feedings & use of the tube immediately if there is:      Continued oozing or bleeding at the site    Redness    Yellow/green or foul smelling drainage at the site    Uncontrolled stomach pain    Many of the supplies mentioned above can be purchased at your local pharmacy      For issues with your tube, please call:    [facility] at [phone]

## 2019-03-08 NOTE — PROCEDURES
RADIOLOGY POST PROCEDURE NOTE    Patient name: Keyon Farias  MRN: [id number]  : 1960    Pre-procedure diagnosis: GJ Tube  Post-procedure diagnosis: Same    Procedure Date/Time: 2019  1:54 PM  Procedure: G tube exchange for a GJ tube.  18 Fr GJ tube placed.  No complications.  Ready for immediate use.  Estimated blood loss: < 5 ml  Specimen(s) collected with description: Existing G tube    The patient tolerated the procedure well with no immediate complications.  Significant findings:  Please see above.    See imaging dictation for procedural details.    Provider name: Brandon Villafana  Assistant(s):None

## 2019-03-08 NOTE — PROGRESS NOTES
Assessment completed for G-J tube change.  Pt was NPO at 1045.  Scheduled at 1200.  IR is notified. Proceed with procedure. Consent was signed with Kayode & his mother as pt's guardian.   Report to Naveed CHRISTINE for continuing of care.

## 2019-03-09 ENCOUNTER — HOSPITAL ENCOUNTER (INPATIENT)
Facility: CLINIC | Age: 59
LOS: 3 days | Discharge: HOME | DRG: 872 | End: 2019-03-12
Attending: EMERGENCY MEDICINE | Admitting: HOSPITALIST
Payer: MEDICARE

## 2019-03-09 ENCOUNTER — APPOINTMENT (OUTPATIENT)
Dept: GENERAL RADIOLOGY | Facility: CLINIC | Age: 59
DRG: 872 | End: 2019-03-09
Attending: EMERGENCY MEDICINE
Payer: MEDICARE

## 2019-03-09 DIAGNOSIS — G40.909 RECURRENT SEIZURES (H): ICD-10-CM

## 2019-03-09 DIAGNOSIS — J18.9 PNEUMONIA OF BOTH LOWER LOBES DUE TO INFECTIOUS ORGANISM: ICD-10-CM

## 2019-03-09 DIAGNOSIS — R65.20 SEVERE SEPSIS (H): Primary | ICD-10-CM

## 2019-03-09 DIAGNOSIS — R79.89 ELEVATED LACTIC ACID LEVEL: ICD-10-CM

## 2019-03-09 DIAGNOSIS — R65.10 SIRS (SYSTEMIC INFLAMMATORY RESPONSE SYNDROME) (H): ICD-10-CM

## 2019-03-09 DIAGNOSIS — A41.9 SEVERE SEPSIS (H): Primary | ICD-10-CM

## 2019-03-09 LAB
ALBUMIN SERPL-MCNC: 3.5 G/DL (ref 3.4–5)
ALBUMIN UR-MCNC: 30 MG/DL
ALP SERPL-CCNC: 103 U/L (ref 40–150)
ALT SERPL W P-5'-P-CCNC: 38 U/L (ref 0–70)
ANION GAP SERPL CALCULATED.3IONS-SCNC: 6 MMOL/L (ref 3–14)
APPEARANCE UR: CLEAR
AST SERPL W P-5'-P-CCNC: 23 U/L (ref 0–45)
BASOPHILS # BLD AUTO: 0.1 10E9/L (ref 0–0.2)
BASOPHILS NFR BLD AUTO: 0.4 %
BILIRUB SERPL-MCNC: 0.2 MG/DL (ref 0.2–1.3)
BILIRUB UR QL STRIP: NEGATIVE
BUN SERPL-MCNC: 19 MG/DL (ref 7–30)
CALCIUM SERPL-MCNC: 8.9 MG/DL (ref 8.5–10.1)
CHLORIDE SERPL-SCNC: 97 MMOL/L (ref 94–109)
CO2 SERPL-SCNC: 32 MMOL/L (ref 20–32)
COLOR UR AUTO: YELLOW
CREAT SERPL-MCNC: 0.82 MG/DL (ref 0.66–1.25)
DIFFERENTIAL METHOD BLD: ABNORMAL
EOSINOPHIL # BLD AUTO: 0.5 10E9/L (ref 0–0.7)
EOSINOPHIL NFR BLD AUTO: 2.2 %
ERYTHROCYTE [DISTWIDTH] IN BLOOD BY AUTOMATED COUNT: 14.4 % (ref 10–15)
FLUAV+FLUBV AG SPEC QL: NEGATIVE
FLUAV+FLUBV AG SPEC QL: NEGATIVE
GFR SERPL CREATININE-BSD FRML MDRD: >90 ML/MIN/{1.73_M2}
GLUCOSE SERPL-MCNC: 129 MG/DL (ref 70–99)
GLUCOSE UR STRIP-MCNC: 150 MG/DL
HCT VFR BLD AUTO: 41.2 % (ref 40–53)
HGB BLD-MCNC: 13.9 G/DL (ref 13.3–17.7)
HGB UR QL STRIP: NEGATIVE
IMM GRANULOCYTES # BLD: 0.1 10E9/L (ref 0–0.4)
IMM GRANULOCYTES NFR BLD: 0.6 %
INTERPRETATION ECG - MUSE: NORMAL
KETONES UR STRIP-MCNC: NEGATIVE MG/DL
LACTATE BLD-SCNC: 3.1 MMOL/L (ref 0.7–2)
LACTATE BLD-SCNC: 3.5 MMOL/L (ref 0.7–2)
LEUKOCYTE ESTERASE UR QL STRIP: NEGATIVE
LYMPHOCYTES # BLD AUTO: 3.4 10E9/L (ref 0.8–5.3)
LYMPHOCYTES NFR BLD AUTO: 15.6 %
MCH RBC QN AUTO: 27.7 PG (ref 26.5–33)
MCHC RBC AUTO-ENTMCNC: 33.7 G/DL (ref 31.5–36.5)
MCV RBC AUTO: 82 FL (ref 78–100)
MONOCYTES # BLD AUTO: 1.3 10E9/L (ref 0–1.3)
MONOCYTES NFR BLD AUTO: 6.2 %
NEUTROPHILS # BLD AUTO: 16.1 10E9/L (ref 1.6–8.3)
NEUTROPHILS NFR BLD AUTO: 75 %
NITRATE UR QL: NEGATIVE
NRBC # BLD AUTO: 0 10*3/UL
NRBC BLD AUTO-RTO: 0 /100
PH UR STRIP: 7 PH (ref 5–7)
PLATELET # BLD AUTO: 166 10E9/L (ref 150–450)
POTASSIUM SERPL-SCNC: 3.9 MMOL/L (ref 3.4–5.3)
PROT SERPL-MCNC: 8.7 G/DL (ref 6.8–8.8)
RBC # BLD AUTO: 5.02 10E12/L (ref 4.4–5.9)
RBC #/AREA URNS AUTO: <1 /HPF (ref 0–2)
SODIUM SERPL-SCNC: 135 MMOL/L (ref 133–144)
SOURCE: ABNORMAL
SP GR UR STRIP: 1.02 (ref 1–1.03)
SPECIMEN SOURCE: NORMAL
UROBILINOGEN UR STRIP-MCNC: 2 MG/DL (ref 0–2)
WBC # BLD AUTO: 21.5 10E9/L (ref 4–11)
WBC #/AREA URNS AUTO: 1 /HPF (ref 0–5)

## 2019-03-09 PROCEDURE — 87040 BLOOD CULTURE FOR BACTERIA: CPT | Performed by: EMERGENCY MEDICINE

## 2019-03-09 PROCEDURE — 71046 X-RAY EXAM CHEST 2 VIEWS: CPT

## 2019-03-09 PROCEDURE — 36415 COLL VENOUS BLD VENIPUNCTURE: CPT

## 2019-03-09 PROCEDURE — 93005 ELECTROCARDIOGRAM TRACING: CPT

## 2019-03-09 PROCEDURE — 99207 ZZC APP CREDIT; MD BILLING SHARED VISIT: CPT | Performed by: NURSE PRACTITIONER

## 2019-03-09 PROCEDURE — 96366 THER/PROPH/DIAG IV INF ADDON: CPT

## 2019-03-09 PROCEDURE — 25800030 ZZH RX IP 258 OP 636: Performed by: EMERGENCY MEDICINE

## 2019-03-09 PROCEDURE — 25000128 H RX IP 250 OP 636: Performed by: EMERGENCY MEDICINE

## 2019-03-09 PROCEDURE — 99223 1ST HOSP IP/OBS HIGH 75: CPT | Mod: AI | Performed by: HOSPITALIST

## 2019-03-09 PROCEDURE — 80053 COMPREHEN METABOLIC PANEL: CPT | Performed by: EMERGENCY MEDICINE

## 2019-03-09 PROCEDURE — 96365 THER/PROPH/DIAG IV INF INIT: CPT

## 2019-03-09 PROCEDURE — 87804 INFLUENZA ASSAY W/OPTIC: CPT | Performed by: EMERGENCY MEDICINE

## 2019-03-09 PROCEDURE — 85025 COMPLETE CBC W/AUTO DIFF WBC: CPT | Performed by: EMERGENCY MEDICINE

## 2019-03-09 PROCEDURE — 12000000 ZZH R&B MED SURG/OB

## 2019-03-09 PROCEDURE — 25000132 ZZH RX MED GY IP 250 OP 250 PS 637: Mod: GY | Performed by: EMERGENCY MEDICINE

## 2019-03-09 PROCEDURE — 99285 EMERGENCY DEPT VISIT HI MDM: CPT | Mod: 25

## 2019-03-09 PROCEDURE — 87086 URINE CULTURE/COLONY COUNT: CPT | Performed by: EMERGENCY MEDICINE

## 2019-03-09 PROCEDURE — 83605 ASSAY OF LACTIC ACID: CPT | Performed by: EMERGENCY MEDICINE

## 2019-03-09 PROCEDURE — A9270 NON-COVERED ITEM OR SERVICE: HCPCS | Mod: GY | Performed by: EMERGENCY MEDICINE

## 2019-03-09 PROCEDURE — 81001 URINALYSIS AUTO W/SCOPE: CPT | Performed by: EMERGENCY MEDICINE

## 2019-03-09 PROCEDURE — 96367 TX/PROPH/DG ADDL SEQ IV INF: CPT

## 2019-03-09 PROCEDURE — 96361 HYDRATE IV INFUSION ADD-ON: CPT

## 2019-03-09 RX ORDER — PIPERACILLIN SODIUM, TAZOBACTAM SODIUM 3; .375 G/15ML; G/15ML
3.38 INJECTION, POWDER, LYOPHILIZED, FOR SOLUTION INTRAVENOUS EVERY 6 HOURS
Status: DISCONTINUED | OUTPATIENT
Start: 2019-03-10 | End: 2019-03-11

## 2019-03-09 RX ORDER — ACETAMINOPHEN 325 MG/1
650 TABLET ORAL EVERY 4 HOURS PRN
Status: DISCONTINUED | OUTPATIENT
Start: 2019-03-09 | End: 2019-03-12 | Stop reason: HOSPADM

## 2019-03-09 RX ORDER — POTASSIUM CHLORIDE 1.5 G/1.58G
20-40 POWDER, FOR SOLUTION ORAL
Status: DISCONTINUED | OUTPATIENT
Start: 2019-03-09 | End: 2019-03-12 | Stop reason: HOSPADM

## 2019-03-09 RX ORDER — ONDANSETRON 2 MG/ML
4 INJECTION INTRAMUSCULAR; INTRAVENOUS EVERY 6 HOURS PRN
Status: DISCONTINUED | OUTPATIENT
Start: 2019-03-09 | End: 2019-03-12 | Stop reason: HOSPADM

## 2019-03-09 RX ORDER — SODIUM CHLORIDE, SODIUM LACTATE, POTASSIUM CHLORIDE, CALCIUM CHLORIDE 600; 310; 30; 20 MG/100ML; MG/100ML; MG/100ML; MG/100ML
INJECTION, SOLUTION INTRAVENOUS CONTINUOUS
Status: DISCONTINUED | OUTPATIENT
Start: 2019-03-10 | End: 2019-03-11

## 2019-03-09 RX ORDER — POTASSIUM CHLORIDE 29.8 MG/ML
20 INJECTION INTRAVENOUS
Status: DISCONTINUED | OUTPATIENT
Start: 2019-03-09 | End: 2019-03-12 | Stop reason: HOSPADM

## 2019-03-09 RX ORDER — HYDROCORTISONE 20 MG/1
20 TABLET ORAL EVERY MORNING
Status: DISCONTINUED | OUTPATIENT
Start: 2019-03-10 | End: 2019-03-12 | Stop reason: HOSPADM

## 2019-03-09 RX ORDER — LEVOTHYROXINE SODIUM 137 UG/1
137 TABLET ORAL DAILY
Status: DISCONTINUED | OUTPATIENT
Start: 2019-03-10 | End: 2019-03-12 | Stop reason: HOSPADM

## 2019-03-09 RX ORDER — CARBAMAZEPINE 100 MG/5ML
150 SUSPENSION ORAL EVERY 6 HOURS
Status: DISCONTINUED | OUTPATIENT
Start: 2019-03-10 | End: 2019-03-12 | Stop reason: HOSPADM

## 2019-03-09 RX ORDER — LIDOCAINE 40 MG/G
CREAM TOPICAL
Status: DISCONTINUED | OUTPATIENT
Start: 2019-03-09 | End: 2019-03-12 | Stop reason: HOSPADM

## 2019-03-09 RX ORDER — GUAR GUM
1 PACKET (EA) ORAL DAILY
Status: DISCONTINUED | OUTPATIENT
Start: 2019-03-10 | End: 2019-03-12 | Stop reason: HOSPADM

## 2019-03-09 RX ORDER — SODIUM CHLORIDE, SODIUM LACTATE, POTASSIUM CHLORIDE, CALCIUM CHLORIDE 600; 310; 30; 20 MG/100ML; MG/100ML; MG/100ML; MG/100ML
INJECTION, SOLUTION INTRAVENOUS CONTINUOUS
Status: DISCONTINUED | OUTPATIENT
Start: 2019-03-09 | End: 2019-03-09

## 2019-03-09 RX ORDER — POTASSIUM CHLORIDE 1500 MG/1
20-40 TABLET, EXTENDED RELEASE ORAL
Status: DISCONTINUED | OUTPATIENT
Start: 2019-03-09 | End: 2019-03-12 | Stop reason: HOSPADM

## 2019-03-09 RX ORDER — SACCHAROMYCES BOULARDII 250 MG
250 CAPSULE ORAL DAILY
Status: DISCONTINUED | OUTPATIENT
Start: 2019-03-10 | End: 2019-03-12 | Stop reason: HOSPADM

## 2019-03-09 RX ORDER — ACETAMINOPHEN 650 MG/1
650 SUPPOSITORY RECTAL EVERY 4 HOURS PRN
Status: DISCONTINUED | OUTPATIENT
Start: 2019-03-09 | End: 2019-03-12 | Stop reason: HOSPADM

## 2019-03-09 RX ORDER — AMOXICILLIN 250 MG
1 CAPSULE ORAL 2 TIMES DAILY PRN
Status: DISCONTINUED | OUTPATIENT
Start: 2019-03-09 | End: 2019-03-12 | Stop reason: HOSPADM

## 2019-03-09 RX ORDER — SODIUM CHLORIDE, SODIUM LACTATE, POTASSIUM CHLORIDE, CALCIUM CHLORIDE 600; 310; 30; 20 MG/100ML; MG/100ML; MG/100ML; MG/100ML
1000 INJECTION, SOLUTION INTRAVENOUS CONTINUOUS
Status: DISCONTINUED | OUTPATIENT
Start: 2019-03-09 | End: 2019-03-09

## 2019-03-09 RX ORDER — BISACODYL 10 MG
10 SUPPOSITORY, RECTAL RECTAL DAILY PRN
Status: DISCONTINUED | OUTPATIENT
Start: 2019-03-09 | End: 2019-03-12 | Stop reason: HOSPADM

## 2019-03-09 RX ORDER — POTASSIUM CHLORIDE 7.45 MG/ML
10 INJECTION INTRAVENOUS
Status: DISCONTINUED | OUTPATIENT
Start: 2019-03-09 | End: 2019-03-12 | Stop reason: HOSPADM

## 2019-03-09 RX ORDER — LANOLIN ALCOHOL/MO/W.PET/CERES
6 CREAM (GRAM) TOPICAL AT BEDTIME
Status: DISCONTINUED | OUTPATIENT
Start: 2019-03-10 | End: 2019-03-12 | Stop reason: HOSPADM

## 2019-03-09 RX ORDER — BACITRACIN ZINC 500 [USP'U]/G
OINTMENT TOPICAL DAILY
Status: DISCONTINUED | OUTPATIENT
Start: 2019-03-10 | End: 2019-03-12 | Stop reason: HOSPADM

## 2019-03-09 RX ORDER — ONDANSETRON 4 MG/1
4 TABLET, ORALLY DISINTEGRATING ORAL EVERY 6 HOURS PRN
Status: DISCONTINUED | OUTPATIENT
Start: 2019-03-09 | End: 2019-03-12 | Stop reason: HOSPADM

## 2019-03-09 RX ORDER — AMOXICILLIN 250 MG
2 CAPSULE ORAL 2 TIMES DAILY PRN
Status: DISCONTINUED | OUTPATIENT
Start: 2019-03-09 | End: 2019-03-12 | Stop reason: HOSPADM

## 2019-03-09 RX ORDER — POTASSIUM CL/LIDO/0.9 % NACL 10MEQ/0.1L
10 INTRAVENOUS SOLUTION, PIGGYBACK (ML) INTRAVENOUS
Status: DISCONTINUED | OUTPATIENT
Start: 2019-03-09 | End: 2019-03-12 | Stop reason: HOSPADM

## 2019-03-09 RX ORDER — MAGNESIUM SULFATE HEPTAHYDRATE 40 MG/ML
4 INJECTION, SOLUTION INTRAVENOUS EVERY 4 HOURS PRN
Status: DISCONTINUED | OUTPATIENT
Start: 2019-03-09 | End: 2019-03-12 | Stop reason: HOSPADM

## 2019-03-09 RX ORDER — PIPERACILLIN SODIUM, TAZOBACTAM SODIUM 4; .5 G/20ML; G/20ML
4.5 INJECTION, POWDER, LYOPHILIZED, FOR SOLUTION INTRAVENOUS ONCE
Status: COMPLETED | OUTPATIENT
Start: 2019-03-09 | End: 2019-03-09

## 2019-03-09 RX ORDER — MULTIPLE VITAMINS W/ MINERALS TAB 9MG-400MCG
1 TAB ORAL DAILY
Status: DISCONTINUED | OUTPATIENT
Start: 2019-03-10 | End: 2019-03-12 | Stop reason: HOSPADM

## 2019-03-09 RX ORDER — HYDROCORTISONE 10 MG/1
10 TABLET ORAL
Status: DISCONTINUED | OUTPATIENT
Start: 2019-03-10 | End: 2019-03-12 | Stop reason: HOSPADM

## 2019-03-09 RX ORDER — NALOXONE HYDROCHLORIDE 0.4 MG/ML
.1-.4 INJECTION, SOLUTION INTRAMUSCULAR; INTRAVENOUS; SUBCUTANEOUS
Status: DISCONTINUED | OUTPATIENT
Start: 2019-03-09 | End: 2019-03-12 | Stop reason: HOSPADM

## 2019-03-09 RX ADMIN — VANCOMYCIN HYDROCHLORIDE 1500 MG: 5 INJECTION, POWDER, LYOPHILIZED, FOR SOLUTION INTRAVENOUS at 21:39

## 2019-03-09 RX ADMIN — ACETAMINOPHEN 1000 MG: 160 SUSPENSION ORAL at 20:46

## 2019-03-09 RX ADMIN — PIPERACILLIN AND TAZOBACTAM 4.5 G: 4; .5 INJECTION, POWDER, FOR SOLUTION INTRAVENOUS at 21:02

## 2019-03-09 RX ADMIN — SODIUM CHLORIDE, POTASSIUM CHLORIDE, SODIUM LACTATE AND CALCIUM CHLORIDE 1000 ML: 600; 310; 30; 20 INJECTION, SOLUTION INTRAVENOUS at 23:33

## 2019-03-09 RX ADMIN — BRIVARACETAM 100 MG: 10 SOLUTION ORAL at 22:09

## 2019-03-09 RX ADMIN — SODIUM CHLORIDE, POTASSIUM CHLORIDE, SODIUM LACTATE AND CALCIUM CHLORIDE 1000 ML: 600; 310; 30; 20 INJECTION, SOLUTION INTRAVENOUS at 21:30

## 2019-03-09 RX ADMIN — SODIUM CHLORIDE 1000 ML: 9 INJECTION, SOLUTION INTRAVENOUS at 20:10

## 2019-03-09 ASSESSMENT — ENCOUNTER SYMPTOMS
VOMITING: 1
FEVER: 1

## 2019-03-10 ENCOUNTER — APPOINTMENT (OUTPATIENT)
Dept: GENERAL RADIOLOGY | Facility: CLINIC | Age: 59
DRG: 872 | End: 2019-03-10
Attending: NURSE PRACTITIONER
Payer: MEDICARE

## 2019-03-10 LAB
ANION GAP SERPL CALCULATED.3IONS-SCNC: 4 MMOL/L (ref 3–14)
BACTERIA SPEC CULT: NO GROWTH
BUN SERPL-MCNC: 12 MG/DL (ref 7–30)
C COLI+JEJUNI+LARI FUSA STL QL NAA+PROBE: NOT DETECTED
C DIFF TOX B STL QL: NEGATIVE
CALCIUM SERPL-MCNC: 7.9 MG/DL (ref 8.5–10.1)
CHLORIDE SERPL-SCNC: 106 MMOL/L (ref 94–109)
CO2 SERPL-SCNC: 27 MMOL/L (ref 20–32)
CREAT SERPL-MCNC: 0.76 MG/DL (ref 0.66–1.25)
EC STX1 GENE STL QL NAA+PROBE: NOT DETECTED
EC STX2 GENE STL QL NAA+PROBE: NOT DETECTED
ENTERIC PATHOGEN COMMENT: NORMAL
ERYTHROCYTE [DISTWIDTH] IN BLOOD BY AUTOMATED COUNT: 14.4 % (ref 10–15)
GFR SERPL CREATININE-BSD FRML MDRD: >90 ML/MIN/{1.73_M2}
GLUCOSE BLDC GLUCOMTR-MCNC: 171 MG/DL (ref 70–99)
GLUCOSE SERPL-MCNC: 109 MG/DL (ref 70–99)
HCT VFR BLD AUTO: 32.2 % (ref 40–53)
HGB BLD-MCNC: 10.5 G/DL (ref 13.3–17.7)
LACTATE BLD-SCNC: 1.4 MMOL/L (ref 0.7–2)
LACTATE BLD-SCNC: 2.7 MMOL/L (ref 0.7–2)
Lab: NORMAL
MCH RBC QN AUTO: 26.6 PG (ref 26.5–33)
MCHC RBC AUTO-ENTMCNC: 32.6 G/DL (ref 31.5–36.5)
MCV RBC AUTO: 82 FL (ref 78–100)
NOROV GI+II ORF1-ORF2 JNC STL QL NAA+PR: NOT DETECTED
PLATELET # BLD AUTO: 127 10E9/L (ref 150–450)
POTASSIUM SERPL-SCNC: 3.6 MMOL/L (ref 3.4–5.3)
PROCALCITONIN SERPL-MCNC: 0.12 NG/ML
RBC # BLD AUTO: 3.95 10E12/L (ref 4.4–5.9)
RVA NSP5 STL QL NAA+PROBE: NOT DETECTED
SALMONELLA SP RPOD STL QL NAA+PROBE: NOT DETECTED
SHIGELLA SP+EIEC IPAH STL QL NAA+PROBE: NOT DETECTED
SODIUM SERPL-SCNC: 137 MMOL/L (ref 133–144)
SPECIMEN SOURCE: NORMAL
SPECIMEN SOURCE: NORMAL
V CHOL+PARA RFBL+TRKH+TNAA STL QL NAA+PR: NOT DETECTED
WBC # BLD AUTO: 14.3 10E9/L (ref 4–11)
Y ENTERO RECN STL QL NAA+PROBE: NOT DETECTED

## 2019-03-10 PROCEDURE — 27210429 ZZH NUTRITION PRODUCT INTERMEDIATE LITER

## 2019-03-10 PROCEDURE — 99207 ZZC CDG-MDM COMPONENT: MEETS LOW - DOWN CODED: CPT | Performed by: INTERNAL MEDICINE

## 2019-03-10 PROCEDURE — 40000895 ZZH STATISTIC SLP IP EVAL DEFER: Performed by: SPEECH-LANGUAGE PATHOLOGIST

## 2019-03-10 PROCEDURE — 25000125 ZZHC RX 250: Performed by: NURSE PRACTITIONER

## 2019-03-10 PROCEDURE — 80048 BASIC METABOLIC PNL TOTAL CA: CPT | Performed by: NURSE PRACTITIONER

## 2019-03-10 PROCEDURE — 74018 RADEX ABDOMEN 1 VIEW: CPT

## 2019-03-10 PROCEDURE — 36415 COLL VENOUS BLD VENIPUNCTURE: CPT | Performed by: NURSE PRACTITIONER

## 2019-03-10 PROCEDURE — 12000000 ZZH R&B MED SURG/OB

## 2019-03-10 PROCEDURE — 25000128 H RX IP 250 OP 636: Performed by: HOSPITALIST

## 2019-03-10 PROCEDURE — 25800030 ZZH RX IP 258 OP 636: Performed by: NURSE PRACTITIONER

## 2019-03-10 PROCEDURE — 83605 ASSAY OF LACTIC ACID: CPT | Performed by: NURSE PRACTITIONER

## 2019-03-10 PROCEDURE — 99232 SBSQ HOSP IP/OBS MODERATE 35: CPT | Performed by: INTERNAL MEDICINE

## 2019-03-10 PROCEDURE — 87506 IADNA-DNA/RNA PROBE TQ 6-11: CPT | Performed by: NURSE PRACTITIONER

## 2019-03-10 PROCEDURE — 00000146 ZZHCL STATISTIC GLUCOSE BY METER IP

## 2019-03-10 PROCEDURE — 25000132 ZZH RX MED GY IP 250 OP 250 PS 637: Mod: GY | Performed by: INTERNAL MEDICINE

## 2019-03-10 PROCEDURE — 25000128 H RX IP 250 OP 636: Performed by: NURSE PRACTITIONER

## 2019-03-10 PROCEDURE — 25000132 ZZH RX MED GY IP 250 OP 250 PS 637: Mod: GY | Performed by: NURSE PRACTITIONER

## 2019-03-10 PROCEDURE — 85027 COMPLETE CBC AUTOMATED: CPT | Performed by: NURSE PRACTITIONER

## 2019-03-10 PROCEDURE — 87493 C DIFF AMPLIFIED PROBE: CPT | Performed by: NURSE PRACTITIONER

## 2019-03-10 PROCEDURE — 84145 PROCALCITONIN (PCT): CPT | Performed by: NURSE PRACTITIONER

## 2019-03-10 PROCEDURE — A9270 NON-COVERED ITEM OR SERVICE: HCPCS | Mod: GY | Performed by: NURSE PRACTITIONER

## 2019-03-10 RX ORDER — GLYCOPYRROLATE 2 MG/1
.5-1 TABLET ORAL 2 TIMES DAILY PRN
Refills: 0 | Status: ON HOLD | COMMUNITY
Start: 2019-02-26 | End: 2019-03-12

## 2019-03-10 RX ORDER — VANCOMYCIN HYDROCHLORIDE 1 G/200ML
1000 INJECTION, SOLUTION INTRAVENOUS EVERY 8 HOURS
Status: DISCONTINUED | OUTPATIENT
Start: 2019-03-10 | End: 2019-03-10

## 2019-03-10 RX ADMIN — Medication 250 MG: at 09:43

## 2019-03-10 RX ADMIN — MICONAZOLE NITRATE: 2 POWDER TOPICAL at 13:56

## 2019-03-10 RX ADMIN — HYDROCORTISONE 10 MG: 10 TABLET ORAL at 17:02

## 2019-03-10 RX ADMIN — SODIUM CHLORIDE, POTASSIUM CHLORIDE, SODIUM LACTATE AND CALCIUM CHLORIDE: 600; 310; 30; 20 INJECTION, SOLUTION INTRAVENOUS at 11:51

## 2019-03-10 RX ADMIN — LEVOTHYROXINE SODIUM 137 MCG: 137 TABLET ORAL at 09:45

## 2019-03-10 RX ADMIN — BRIVARACETAM 100 MG: 10 SOLUTION ORAL at 21:31

## 2019-03-10 RX ADMIN — POTASSIUM & SODIUM PHOSPHATES POWDER PACK 280-160-250 MG 1 PACKET: 280-160-250 PACK at 21:31

## 2019-03-10 RX ADMIN — VANCOMYCIN HYDROCHLORIDE 1000 MG: 1 INJECTION, SOLUTION INTRAVENOUS at 04:56

## 2019-03-10 RX ADMIN — HYDROCORTISONE 20 MG: 20 TABLET ORAL at 09:45

## 2019-03-10 RX ADMIN — PIPERACILLIN SODIUM,TAZOBACTAM SODIUM 3.38 G: 3; .375 INJECTION, POWDER, FOR SOLUTION INTRAVENOUS at 15:59

## 2019-03-10 RX ADMIN — SODIUM BICARBONATE 20 MG: 84 INJECTION, SOLUTION INTRAVENOUS at 20:40

## 2019-03-10 RX ADMIN — SODIUM BICARBONATE 20 MG: 84 INJECTION, SOLUTION INTRAVENOUS at 09:58

## 2019-03-10 RX ADMIN — VITAMIN D 2000 UNITS: 25 TAB ORAL at 09:45

## 2019-03-10 RX ADMIN — SODIUM CHLORIDE, POTASSIUM CHLORIDE, SODIUM LACTATE AND CALCIUM CHLORIDE: 600; 310; 30; 20 INJECTION, SOLUTION INTRAVENOUS at 00:00

## 2019-03-10 RX ADMIN — POTASSIUM & SODIUM PHOSPHATES POWDER PACK 280-160-250 MG 1 PACKET: 280-160-250 PACK at 17:02

## 2019-03-10 RX ADMIN — SODIUM CHLORIDE, POTASSIUM CHLORIDE, SODIUM LACTATE AND CALCIUM CHLORIDE 500 ML: 600; 310; 30; 20 INJECTION, SOLUTION INTRAVENOUS at 03:06

## 2019-03-10 RX ADMIN — CARBAMAZEPINE 150 MG: 100 SUSPENSION ORAL at 20:40

## 2019-03-10 RX ADMIN — SODIUM CHLORIDE, POTASSIUM CHLORIDE, SODIUM LACTATE AND CALCIUM CHLORIDE: 600; 310; 30; 20 INJECTION, SOLUTION INTRAVENOUS at 21:30

## 2019-03-10 RX ADMIN — BACITRACIN: 500 OINTMENT TOPICAL at 09:42

## 2019-03-10 RX ADMIN — PIPERACILLIN SODIUM,TAZOBACTAM SODIUM 3.38 G: 3; .375 INJECTION, POWDER, FOR SOLUTION INTRAVENOUS at 03:07

## 2019-03-10 RX ADMIN — PIPERACILLIN SODIUM,TAZOBACTAM SODIUM 3.38 G: 3; .375 INJECTION, POWDER, FOR SOLUTION INTRAVENOUS at 20:40

## 2019-03-10 RX ADMIN — CARBAMAZEPINE 150 MG: 100 SUSPENSION ORAL at 00:38

## 2019-03-10 RX ADMIN — POTASSIUM & SODIUM PHOSPHATES POWDER PACK 280-160-250 MG 1 PACKET: 280-160-250 PACK at 04:56

## 2019-03-10 RX ADMIN — CARBAMAZEPINE 150 MG: 100 SUSPENSION ORAL at 13:49

## 2019-03-10 RX ADMIN — MELATONIN 6 MG: 3 TAB ORAL at 00:39

## 2019-03-10 RX ADMIN — BRIVARACETAM 100 MG: 10 SOLUTION ORAL at 09:43

## 2019-03-10 RX ADMIN — MULTIPLE VITAMINS W/ MINERALS TAB 1 TABLET: TAB at 09:45

## 2019-03-10 RX ADMIN — MELATONIN 6 MG: 3 TAB ORAL at 21:31

## 2019-03-10 RX ADMIN — CARBAMAZEPINE 150 MG: 100 SUSPENSION ORAL at 05:08

## 2019-03-10 RX ADMIN — SODIUM BICARBONATE 20 MG: 84 INJECTION, SOLUTION INTRAVENOUS at 00:39

## 2019-03-10 RX ADMIN — VANCOMYCIN HYDROCHLORIDE 1000 MG: 1 INJECTION, SOLUTION INTRAVENOUS at 13:48

## 2019-03-10 RX ADMIN — PIPERACILLIN SODIUM,TAZOBACTAM SODIUM 3.38 G: 3; .375 INJECTION, POWDER, FOR SOLUTION INTRAVENOUS at 09:43

## 2019-03-10 ASSESSMENT — ACTIVITIES OF DAILY LIVING (ADL)
DRESS: 4-->COMPLETELY DEPENDENT
ADLS_ACUITY_SCORE: 45
RETIRED_EATING: 4-->COMPLETELY DEPENDENT
COGNITION: 2 - DIFFICULTY WITH ORGANIZING THOUGHTS
AMBULATION: 4-->COMPLETELY DEPENDENT
ADLS_ACUITY_SCORE: 45
WHICH_OF_THE_ABOVE_FUNCTIONAL_RISKS_HAD_A_RECENT_ONSET_OR_CHANGE?: AMBULATION;TRANSFERRING;TOILETING;BATHING;DRESSING;EATING;SWALLOWING;COGNITION;COMMUNICATION/SPEECH
RETIRED_COMMUNICATION: 2-->DIFFICULTY SPEAKING (NOT RELATED TO LANGUAGE BARRIER)
FALL_HISTORY_WITHIN_LAST_SIX_MONTHS: NO
ADLS_ACUITY_SCORE: 45
SWALLOWING: 2-->DIFFICULTY SWALLOWING LIQUIDS/FOODS
ADLS_ACUITY_SCORE: 45
BATHING: 4-->COMPLETELY DEPENDENT
ADLS_ACUITY_SCORE: 45
ADLS_ACUITY_SCORE: 45
TRANSFERRING: 4-->COMPLETELY DEPENDENT
TOILETING: 4-->COMPLETELY DEPENDENT

## 2019-03-10 NOTE — PLAN OF CARE
Patient non-verbal, alert to voice.  Right side hemiplegia.  Turned and repositioned every 2 hours with assist of 2.  Incontinent of urine.  Rash in bilateral groin folds, nystatin powder applied.  Buttocks red area both blanchable and non-blanchable.  WOC consult ordered. Red areas on penis, per hospitalist note, old pressure wound.  G-J tube with continuous feed over 12 hours, started today at 1430 through the J-tube.  Medications given through G-tube.  Patient is strict NPO, oral cares done frequently.

## 2019-03-10 NOTE — CONSULTS
CLINICAL NUTRITION SERVICES  -  ASSESSMENT NOTE      Recommendations Ordered by Registered Dietitian (RD):   Isosource 1.5 at 100 mL/hr x 12 hours (7 am - 7 pm) to provide 1800 kcal (23 kcal/kg), 82g protein (1.05 gl/kg), 211g CHO, 18g fiber and 912mL water.   Std H2O flushes of 60 mL q 4 hour while on IVF, then 120 mL q 4 hours..     Malnutrition:   % Weight Loss:  None noted  % Intake:  No decreased intake noted  Subcutaneous Fat Loss:  None observed  Muscle Loss:  None observed  Fluid Retention:  None noted    Malnutrition Diagnosis: Patient does not meet two of the above criteria necessary for diagnosing malnutrition          REASON FOR ASSESSMENT  Keyon Farias is a 58 year old male seen by Registered Dietitian for Provider Order - Registered Dietitian to Assess and Order TF per Medical Nutrition Therapy Protocol      NUTRITION HISTORY  - Information obtained from EMR.  Pt is very well known to our service. He has been on TF via GJ tube since 8/2016; he just had a new tube placed on 3/8 - 18Fr TORY GJ tube.  Per previous records pt's home TF regimen is as follows - Jevity 1.5 (5 to 5.5 cans daily) x 12 hours during the day to provide 1528-8551 kcal, 77-83g protein, ~260g CHO, ~27g fiber and ~900mL free water. H2O flushes 120 mL QID.  Pt lives with his mother, she likes to keep the TF rate at no more than 100 ml/hr. He is fed during the day rather than at night so that he can be up in the chair to prevent aspiration.   His home meds include Vit D, Benefiber, Florastor, Neutraphos, and Centrum Silver vitamins.       CURRENT NUTRITION ORDERS  Diet Order:     NPO       NUTRITION FOCUSED PHYSICAL ASSESSMENT (NFPA)  Completed:             No:           Patient is in isolation for C diff        Observed  Unable to assess   Obtained from Chart/Interdisciplinary Team  None noted    ANTHROPOMETRICS  Height: 6'  Weight: 172 lbs 9.6 oz (78.3 kg) - 3/10  Body mass index is 23.41 kg/m .  Weight Status:  Normal BMI  IBW: 81 kg  +/- 10%  % IBW: 97%  Weight History: Note current wt is trending up over the past month, ?fluid.  Wt Readings from Last 20 Encounters:   03/10/19 78.3 kg (172 lb 9.6 oz)   03/04/19 72.6 kg (160 lb)   02/22/19 74.4 kg (164 lb 0.4 oz)   02/01/19 71.5 kg (157 lb 11.2 oz)   01/16/19 71.6 kg (157 lb 13.6 oz)   12/24/18 62 kg (136 lb 11 oz)   11/25/18 71.7 kg (158 lb 1.1 oz)   11/04/18 71.5 kg (157 lb 10.1 oz)   09/22/18 70.3 kg (155 lb)   09/21/18 78 kg (171 lb 15.3 oz)   09/03/18 72.3 kg (159 lb 6.3 oz)   07/07/18 74.7 kg (164 lb 10.9 oz)   05/19/18 71.8 kg (158 lb 3.2 oz)   05/05/18 77.4 kg (170 lb 10.2 oz)   04/04/18 72.6 kg (160 lb)   03/13/18 70.3 kg (155 lb)   02/21/18 73 kg (161 lb)                   LABS  Labs reviewed    MEDICATIONS  LR IVF @ 100 mL/hr  As per home: Florastor, Vit D, Neutraphos, Thera-Vit-M, Nutrisource Fiber      ASSESSED NUTRITION NEEDS PER APPROVED PRACTICE GUIDELINES:  Dosing Weight 78.3 kg - 3/10  Estimated Energy Needs: 6058-4606 kcals (25-30 Kcal/Kg)  Justification: maintenance  Estimated Protein Needs:  grams protein (1-1.3 g pro/Kg)  Justification: maintenance  Estimated Fluid Needs: 1 mL/Kcal  Justification: maintenance      MALNUTRITION:  % Weight Loss:  None noted  % Intake:  No decreased intake noted  Subcutaneous Fat Loss:  None observed  Muscle Loss:  None observed  Fluid Retention:  None noted    Malnutrition Diagnosis: Patient does not meet two of the above criteria necessary for diagnosing malnutrition      NUTRITION DIAGNOSIS:  No nutrition diagnosis identified at this time       NUTRITION INTERVENTIONS  Recommendations / Nutrition Prescription  Isosource 1.5 at 100 mL/hr x 12 hours (7 am - 7 pm) to provide 1800 kcal (23 kcal/kg), 82g protein (1.05 gl/kg), 211g CHO, 18g fiber and 912mL water.   Std H2O flushes of 60 mL q 4 hour while on IVF, then 120 mL q 4 hours..      Implementation  Nutrition education: No education needs at this time  EN Schedule - ordered the above  TF      Nutrition Goals  EN will continue to meet assessed needs      MONITORING AND EVALUATION:  Progress towards goals will be monitored and evaluated per protocol and Practice Guidelines      Unique Schultz RD  Pager [phone] (M-F)            [phone] (W/E & Hol)

## 2019-03-10 NOTE — PHARMACY-VANCOMYCIN DOSING SERVICE
Pharmacy Vancomycin Initial Note  Date of Service March 10, 2019  Patient's  1960  58 year old, male    Indication: Sepsis    Current estimated CrCl = Estimated Creatinine Clearance: 103.3 mL/min (based on SCr of 0.82 mg/dL).    Creatinine for last 3 days  3/9/2019:  8:00 PM Creatinine 0.82 mg/dL    Recent Vancomycin Level(s) for last 3 days  No results found for requested labs within last 72 hours.      Vancomycin IV Administrations (past 72 hours)                   vancomycin (VANCOCIN) 1,500 mg in sodium chloride 0.9 % 250 mL intermittent infusion (mg) 1,500 mg New Bag 19 2139                Nephrotoxins and other renal medications (From now, onward)    Start     Dose/Rate Route Frequency Ordered Stop    03/10/19 0500  vancomycin (VANCOCIN) 1000 mg in dextrose 5% 200 mL PREMIX      1,000 mg  200 mL/hr over 1 Hours Intravenous EVERY 8 HOURS 03/10/19 0003      03/10/19 0300  piperacillin-tazobactam (ZOSYN) 3.375 g vial to attach to  mL bag     Comments:  Pharmacy can adjust dose based on renal function.    3.375 g  over 30 Minutes Intravenous EVERY 6 HOURS 19 2349            Contrast Orders - past 72 hours (72h ago, onward)    None                Plan:  1.  Start vancomycin  1000 mg IV q8h.   2.  Goal Trough Level: 15-20 mg/L   3.  Pharmacy will check trough levels as appropriate in 1-3 Days.    4. Serum creatinine levels will be ordered daily for the first week of therapy and at least twice weekly for subsequent weeks.    5. [place] method utilized to dose vancomycin therapy: Method 1    Adarsh Moncada

## 2019-03-10 NOTE — PLAN OF CARE
Pt  nonverbal, HECTOR orientation. Follows some commands. R sided hemiplegia. VSS. Turned with A2. Pt does not appear to be in pain Incontinent.  Continue to monitor.

## 2019-03-10 NOTE — ED NOTES
Spoke with pt legal guardian, consent for services given verbally. Guardian updated on pt status and plan of care.

## 2019-03-10 NOTE — H&P
[facility]    History and Physical - Hospitalist Service       Date of Admission:  3/9/2019    Assessment & Plan   Keyon Farias is a 58 year old male with PMH significant for TBI with R side hemiplegia, dysphagia 2/2 TBI s/p GJ tube, seizure disorder, panhypopituitarism, recurrent aspiration PNA, UTI with pseudomonas and E.coli, chronic normocytic anemia, GERD, hypothyroidism, history of MRSA/VRE admitted on 3/9/2019. He presents with fever, nausea, vomiting, 1 week history loose stools.    Severe sepsis, unknown origin.  Nausea, vomiting, loose stools.  History of recurrent aspiration pneumonia.  History of pseudomonas, E.coli UTI.  - Pt has had multiple readmissions (11/2/18, 11/22/18, 12/18/18, 1/14/19, 1/31/19, 2/20/19, 2/27/19) for recurrent aspiration PNA; most recently discharged 3/4/19.  Pt with notable pseudomonas and E.coli UTI  1/14/19.  CXR on admission today grossly clear, no obvious pleural effusions.  Negative influenza A/B.  UA remarkably WNL.  WBC 21.5, lactic acid 3.1 on admission.  Blood cultures x2 and urine culture NGTD.  Pt received 2L IVF bolus, started on vancomycin and zosyn.  Pt's mother/guardian notes 1 week history loose stools while receiving antibiotics.  Pt's GJ tube was found to have a cut in the tubing; therefore, a temporary G tube was placed 3/5 and a GJ tube was placed by IR on 3/8.  - Uptrending lactic acid 3.1>3.5; ED ordered for an additional 500 ml NS bolus.  Will repeat lactic acid at 0000.  - Will add on procalcitonin  - WBC 21.5, will repeat CBC in AM  - Abdominal x-ray ordered to evaluate for free air in setting of recent GJ tube exchange.  No signs/symptoms of a surgical abdomen appreciated.  - Will follow blood and urine culture results  - MIVF  ml/hr  - Pharmacy to dose vancomycin; zosyn  - C-diff and stool panel ordered; enteric precautions until result noted  - Will repeat BMP in AM to ensure stability of electrolytes in setting of N/V;  electrolyte protocol ordered    H/o TBI with right sided hemiplegia   Dysphagia 2/2 TBI s/p GJ tube  Panhypopituitarism   Seizure disorder 2/2 TBI  Hypothyroidism  Stable at this time.  No signs of adrenal insufficiency at this time.  - PTA Brivaracetam, Tegretol  - PTA Synthroid 137 mcg  - PTA Hydrocortisone  - Speech consulted; appreciate recommendations.  Pt to remain NPO until seen by speech.  Please perform oral cares frequently.  - Previously ordered TF regimen; to be confirmed with mother/guardian prior to initiation.  Isosource 1.5 @ 100 mL/hr x 12 hrs (7am-7pm), Free Water Flush: 60 mL every 4 hrs (while on IVF; to increase to 120 ml when IVF discontinued).  Needs to be ordered.    Chronic anemia.  - Baseline hgb 10-12, on admission 13.9.    GERD.  - PTA pantoprazole    Recent penile pressure injury.  - Pericare    History of MRSA, VRE.    Pain Assessment:  Current Pain Score 3/9/2019   Patient currently in pain? HECTOR   Pain score (0-10) -   Pain location -   Pain descriptors -   [facility]   Keyon liriano pain level was assessed and he currently denies pain.       Diet: TF, pending confirmation  DVT Prophylaxis: Pneumatic Compression Devices  Lerma Catheter: not present  Code Status: Full code; confirmed with pt's mother/guardian over the phone    Disposition Plan   Expected discharge: 2 - 3 days, recommended to prior living arrangement once SIRS/Sepsis treated.  Entered: NATHANIEL Bundy CNP 03/09/2019, 10:15 PM     The patient's care was discussed with the Attending Physician, Dr. Jenkins and Patient's Family.    NATHANIEL Bundy CNP  [facility]    ______________________________________________________________________    Chief Complaint   N/V, fevers    History is obtained from the electronic health record and patient's mother    History of Present Illness   Keyon Farias is a 58 year old male with PMH significant for TBI with R side hemiplegia, dysphagia 2/2 TBI s/p GJ tube,  "seizure disorder, panhypopituitarism, recurrent aspiration PNA, UTI with pseudomonas and E.coli, chronic normocytic anemia, GERD, hypothyroidism, history of MRSA/VRE admitted on 3/9/2019. He presents with fever, nausea, vomiting, 1 week history loose stools.  Pt's mother notes development of a \"cough\" earlier today.  Pt subsequently had an episode of nausea/vomiting this evening, \"phlegm\" appearing.  Shortly thereafter with noted fever.  Pt's mother called EMS as concerned for aspiration.  Of note, no hypoxia or increased work of breathing noted.  Pt's VS remain relatively stable with SBP 110s-130s, HR 110s.  Pt's initial temperature of 102 now 99 post APAP.    Pt has had multiple readmissions (11/2/18, 11/22/18, 12/18/18, 1/14/19, 1/31/19, 2/20/19, 2/27/19) for recurrent aspiration PNA; most recently discharged 3/4/19.  Pt with notable pseudomonas and E.coli UTI  1/14/19.  CXR on admission today grossly clear, no obvious pleural effusions.  Negative influenza A/B.  UA remarkably WNL.  WBC 21.5, lactic acid 3.1 on admission.  Blood cultures x2 and urine culture NGTD.  Pt received 2L IVF bolus, started on vancomycin and zosyn.  Pt's mother/guardian notes 1 week history loose stools while receiving antibiotics.  Pt's GJ tube was found to have a cut in the tubing; therefore, a temporary G tube was placed 3/5 and a GJ tube was placed by IR on 3/8.    Review of Systems    The 10 point Review of Systems is negative other than noted in the HPI or here.     Past Medical History    I have reviewed this patient's medical history and updated it with pertinent information if needed.   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)     Patient has little porductive speech but at baseline can understand simple commands consistently     DVT of upper extremity (deep vein thrombosis) (H)      Gastro-oesophageal reflux disease      Panhypopituitarism (H)     Secondary to Traumatic Brain " Injury      Pneumonia      Seizures (H)     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     Septic shock (H)      Spastic hemiplegia affecting dominant side (H)     related to wil injury     Thyroid disease      Tracheostomy care (H)      Traumatic brain injury (H) 1989     Unspecified cerebral artery occlusion with cerebral infarction 1989     UTI (urinary tract infection)      Ventricular fibrillation (H)      Ventricular tachyarrhythmia (H)      Past Surgical History   I have reviewed this patient's surgical history and updated it with pertinent information if needed.  Past Surgical History:   Procedure Laterality Date     ENDOSCOPIC ULTRASOUND UPPER GASTROINTESTINAL TRACT (GI) N/A 1/30/2017    Procedure: ENDOSCOPIC ULTRASOUND, ESOPHAGOSCOPY / UPPER GASTROINTESTINAL TRACT (GI);  Surgeon: Jus Montana MD;  Location: [facility] OR     ENDOSCOPIC ULTRASOUND, ESOPHAGOSCOPY, GASTROSCOPY, DUODENOSCOPY (EGD), NECROSECTOMY N/A 2/7/2017    Procedure: ENDOSCOPIC ULTRASOUND, ESOPHAGOSCOPY, GASTROSCOPY, DUODENOSCOPY (EGD), NECROSECTOMY;  Surgeon: Jack Marcus MD;  Location:  OR     ESOPHAGOSCOPY, GASTROSCOPY, DUODENOSCOPY (EGD), COMBINED  3/13/2014    Procedure: COMBINED ESOPHAGOSCOPY, GASTROSCOPY, DUODENOSCOPY (EGD), BIOPSY SINGLE OR MULTIPLE;  gastroscopy;  Surgeon: Digna Rhodes MD;  Location: [facility]     ESOPHAGOSCOPY, GASTROSCOPY, DUODENOSCOPY (EGD), COMBINED N/A 12/6/2016    Procedure: COMBINED ESOPHAGOSCOPY, GASTROSCOPY, DUODENOSCOPY (EGD);  Surgeon: Digna Rhodes MD;  Location: [facility]     ESOPHAGOSCOPY, GASTROSCOPY, DUODENOSCOPY (EGD), COMBINED N/A 2/7/2017    Procedure: COMBINED ENDOSCOPIC ULTRASOUND, ESOPHAGOSCOPY, GASTROSCOPY, DUODENOSCOPY (EGD), FINE NEEDLE ASPIRATE/BIOPSY;  Surgeon: Too Thakur MD;  Location:  OR     HEAD & NECK SURGERY      reconstructive facial surgery following accident in 1989     IR FOLLOW UP VISIT INPATIENT   2019     IR GASTRO JEJUNOSTOMY TUBE CHANGE  2018     IR GASTRO JEJUNOSTOMY TUBE CHANGE  2019     IR GASTRO JEJUNOSTOMY TUBE CHANGE  3/8/2019     LAPAROSCOPIC APPENDECTOMY  2013    Procedure: LAPAROSCOPIC APPENDECTOMY;  LAPAROSCOPIC APPENDECTOMY;  Surgeon: Manish Pierce MD;  Location:  OR     LAPAROSCOPIC ASSISTED INSERTION TUBE GASTROTOMY N/A 2016    Procedure: LAPAROSCOPIC ASSISTED INSERTION TUBE GASTROSTOMY;  Surgeon: Manish Pierce MD;  Location:  OR     ORTHOPEDIC SURGERY      right hand repair     TRACHEOSTOMY N/A 9/3/2016    Procedure: TRACHEOSTOMY;  Surgeon: João Ortiz MD;  Location:  OR     TRACHEOSTOMY N/A 2016    Procedure: TRACHEOSTOMY;  Surgeon: João Ortiz MD;  Location:  OR     VASCULAR SURGERY       Social History   I have reviewed this patient's social history and updated it with pertinent information if needed.  Social History     Tobacco Use     Smoking status: Former Smoker     Last attempt to quit: 1989     Years since quittin.8     Smokeless tobacco: Never Used   Substance Use Topics     Alcohol use: No     Drug use: No     Family History   I have reviewed this patient's family history and updated it with pertinent information if needed.   No family history on file.    Prior to Admission Medications   Prior to Admission Medications   Prescriptions Last Dose Informant Patient Reported? Taking?   Brivaracetam (BRIVIACT) 10 MG/ML solution 3/9/2019 at x2 Mother Yes Yes   Si mg by Oral or FT or NG tube route 2 times daily @0900 and 2100   Multiple Vitamins-Minerals (CENTRUM SILVER) per tablet 3/9/2019 at Unknown time Mother Yes Yes   Sig: Take 1 tablet by mouth daily Crush and feed via j-tube   Vitamin D, Cholecalciferol, 1000 units TABS 3/9/2019 at Unknown time Mother Yes Yes   Sig: Take 2,000 Units by mouth every morning Crush and feed via j-tube   Wheat Dextrin (BENEFIBER) POWD 3/9/2019 at Unknown time Mother  Yes Yes   Si teaspoonful by Per NG tube route daily    acetaminophen (TYLENOL) 500 MG tablet prn Mother Yes Yes   Si,000 mg by Oral or FT or NG tube route every 6 hours as needed for mild pain   albuterol (2.5 MG/3ML) 0.083% neb solution  Mother Yes Yes   Sig: Take 1 vial by nebulization every 4 hours as needed for shortness of breath / dyspnea or wheezing   bacitracin ointment  Mother Yes Yes   Sig: Apply topically daily To PEG site.    calcium carbonate 1250 (500 CA) MG/5ML SUSP suspension 3/9/2019 at x2 Mother No Yes   Si mLs (1,250 mg) by Per J Tube route 3 times daily (with meals)   Patient taking differently: by Per J Tube route 3 times daily (with meals)    carBAMazepine (TEGRETOL) 100 MG/5ML suspension 3/9/2019 at x3 Mother Yes Yes   Sig: Take 150 mg by mouth every 6 hours At 06:00, 12:00, 18:00 and 24:00 for seizures   hydrocortisone (CORTEF) 5 MG tablet 3/9/2019 at Unknown time Mother Yes Yes   Sig: 10 mg by Oral or FT or NG tube route daily (with dinner) At 1500   hydrocortisone (CORTEF) 5 MG tablet 3/9/2019 at Unknown time Mother Yes Yes   Si mg by Oral or FT or NG tube route every morning    levothyroxine (SYNTHROID/LEVOTHROID) 137 MCG tablet 3/9/2019 at Unknown time Mother Yes Yes   Si mcg by Oral or FT or NG tube route daily   melatonin (MELATONIN) 1 MG/ML LIQD liquid 3/8/2019 at Unknown time Mother Yes Yes   Si mg by Per NG tube route At Bedtime    pantoprazole (PROTONIX) 2 mg/mL SUSP suspension 3/9/2019 at x2 Mother Yes Yes   Si mg by Per NG tube route 2 times daily    potassium & sodium phosphates (NEUTRA-PHOS) 280-160-250 MG Packet 3/9/2019 at x2 Mother Yes Yes   Sig: Take 1 packet by mouth 3 times daily 0900, 1500, 2100.    saccharomyces boulardii (FLORASTOR) 250 MG capsule 3/9/2019 at Unknown time Mother Yes Yes   Si mg by Oral or Feeding Tube route daily   testosterone cypionate (DEPOTESTOTERONE CYPIONATE) 200 MG/ML injection 3/7/2019 Mother Yes Yes   Sig:  "Inject 76 mg into the muscle See Admin Instructions Every 2 weeks on Fridays   76 mg or 0.38 mL      Facility-Administered Medications: None     Allergies   Allergies   Allergen Reactions     Dilantin [Phenytoin Sodium]      Valproic Acid      Toxicity c bone marrow suspension, elevated ammonia levels      Physical Exam   Vital Signs: Temp: 99.6  F (37.6  C) Temp src: Oral BP: 109/68 Pulse: 117 Heart Rate: 120 Resp: 10 SpO2: 95 % O2 Device: None (Room air)    Weight: 0 lbs 0 oz    Gen: Pt lying in bed, awake, alert, nontoxic appearing, gesturing \"thumbs up\"  Neuro: Awake, alert, nonverbal with noted audible sounds, follows simple commands  CV: Mildly tachycardic rate and rhythm, systolic and diastolic murmurs noted, systolic 4/6 2nd ICS LSB, 3/6 diastolic murmur apical region  Resp: In no apparent respiratory distress, clear to auscultation bilaterally, no crackles or wheezes noted, no sputum appreciated  GI: Soft, nondistended, nontender, noted GJT LUQ, no guarding or rebound tenderness noted  Skin: Warm, dry, erythematous penile region  Ext: Moves LUE/LLE extremities with noted RUE contracted appearance, bilateral pedal edema noted    Data   Data reviewed today: I reviewed all medications, new labs and imaging results over the last 24 hours. I personally reviewed the EKG tracing showing sinus tachycardia with L anterior fascicular block.    Recent Labs   Lab 03/09/19 2000 03/04/19  0721 03/03/19  0734   WBC 21.5* 10.4 9.4   HGB 13.9 12.6* 12.4*   MCV 82 82 84    239 246    138 140   POTASSIUM 3.9 3.6 3.2*   CHLORIDE 97 106 106   CO2 32 27 27   BUN 19 17 16   CR 0.82 0.98 0.95   ANIONGAP 6 5 7   STEVE 8.9 8.4* 8.6   * 118* 133*   ALBUMIN 3.5  --   --    PROTTOTAL 8.7  --   --    BILITOTAL 0.2  --   --    ALKPHOS 103  --   --    ALT 38  --   --    AST 23  --   --      Recent Results (from the past 24 hour(s))   XR Chest 2 Views    Narrative    CHEST TWO VIEW   3/9/2019 8:40 PM     HISTORY: Sepsis, " history of aspiration pneumonia.    COMPARISON: Chest x-ray 2/27/2019.      Impression    IMPRESSION: Two views of the chest are performed. Lungs are  hypoaerated but grossly clear. Heart is normal in size. No  pneumothorax or obvious pleural effusions. Lateral view is limited due  to hypoaeration of the lungs. No obvious interval change since prior  exam.    GEE BOWMAN MD   XR Abdomen Port 1 View    Narrative    ABDOMEN SINGLE LEFT LATERAL DECUBITUS VIEW PORTABLE   3/10/2019 12:20  AM     HISTORY: Recent gastrojejunostomy tube exchange, now with severe  sepsis. Evaluate for free air.    COMPARISON: None.      Impression    IMPRESSION:   1. No convincing free intraperitoneal gas.  2. A percutaneous balloon-tipped gastrojejunostomy tube projects over  the upper left hemiabdomen.    ELODIA ANN MD

## 2019-03-10 NOTE — ED NOTES
Bed: ED28  Expected date:   Expected time:   Means of arrival:   Comments:  Kerri 1- 58M- Yuliana MURRAY

## 2019-03-10 NOTE — PROGRESS NOTES
RECEIVING UNIT ED HANDOFF REVIEW    ED Nurse Handoff Report was reviewed by: Emerson Richardson on March 9, 2019 at 11:34 PM

## 2019-03-10 NOTE — PHARMACY-ADMISSION MEDICATION HISTORY
Admission medication history interview status for the 3/9/2019  admission is complete. See EPIC admission navigator for prior to admission medications     Medication history source reliability:Good    Actions taken by pharmacist (provider contacted, etc):None     Additional medication history information not noted on PTA med list :Spoke with mom over the phone    Medication reconciliation/reorder completed by provider prior to medication history? No    Time spent in this activity: 15 min    Prior to Admission medications    Medication Sig Last Dose Taking? Auth Provider   acetaminophen (TYLENOL) 500 MG tablet 1,000 mg by Oral or FT or NG tube route every 6 hours as needed for mild pain prn Yes Unknown, Entered By History   albuterol (2.5 MG/3ML) 0.083% neb solution Take 1 vial by nebulization every 4 hours as needed for shortness of breath / dyspnea or wheezing  Yes Unknown, Entered By History   bacitracin ointment Apply topically daily To PEG site.   Yes Unknown, Entered By History   Brivaracetam (BRIVIACT) 10 MG/ML solution 100 mg by Oral or FT or NG tube route 2 times daily @0900 and 2100 3/9/2019 at x2 Yes Reported, Patient   calcium carbonate 1250 (500 CA) MG/5ML SUSP suspension 5 mLs (1,250 mg) by Per J Tube route 3 times daily (with meals)  Patient taking differently: by Per J Tube route 3 times daily (with meals)  3/9/2019 at x2 Yes Mariana Venegas MD   carBAMazepine (TEGRETOL) 100 MG/5ML suspension Take 150 mg by mouth every 6 hours At 06:00, 12:00, 18:00 and 24:00 for seizures 3/9/2019 at x3 Yes Unknown, Entered By History   hydrocortisone (CORTEF) 5 MG tablet 10 mg by Oral or FT or NG tube route daily (with dinner) At 1500 3/9/2019 at Unknown time Yes Unknown, Entered By History   hydrocortisone (CORTEF) 5 MG tablet 20 mg by Oral or FT or NG tube route every morning  3/9/2019 at Unknown time Yes Unknown, Entered By History   levothyroxine (SYNTHROID/LEVOTHROID) 137 MCG tablet 137 mcg by Oral or  FT or NG tube route daily 3/9/2019 at Unknown time Yes Unknown, Entered By History   melatonin (MELATONIN) 1 MG/ML LIQD liquid 6 mg by Per NG tube route At Bedtime  3/8/2019 at Unknown time Yes Unknown, Entered By History   Multiple Vitamins-Minerals (CENTRUM SILVER) per tablet Take 1 tablet by mouth daily Crush and feed via j-tube 3/9/2019 at Unknown time Yes Unknown, Entered By History   pantoprazole (PROTONIX) 2 mg/mL SUSP suspension 20 mg by Per NG tube route 2 times daily  3/9/2019 at x2 Yes Unknown, Entered By History   potassium & sodium phosphates (NEUTRA-PHOS) 280-160-250 MG Packet Take 1 packet by mouth 3 times daily 0900, 1500, 2100.  3/9/2019 at x2 Yes Unknown, Entered By History   saccharomyces boulardii (FLORASTOR) 250 MG capsule 250 mg by Oral or Feeding Tube route daily 3/9/2019 at Unknown time Yes Unknown, Entered By History   testosterone cypionate (DEPOTESTOTERONE CYPIONATE) 200 MG/ML injection Inject 76 mg into the muscle See Admin Instructions Every 2 weeks on Fridays   76 mg or 0.38 mL 3/7/2019 Yes Unknown, Entered By History   Vitamin D, Cholecalciferol, 1000 units TABS Take 2,000 Units by mouth every morning Crush and feed via j-tube 3/9/2019 at Unknown time Yes Unknown, Entered By History   Wheat Dextrin (BENEFIBER) POWD 2 teaspoonful by Per NG tube route daily  3/9/2019 at Unknown time Yes Unknown, Entered By History

## 2019-03-10 NOTE — PROVIDER NOTIFICATION
Read abdominal xray results to hospitalist and she gave the ok to use patient's G-tube for medications.

## 2019-03-10 NOTE — PLAN OF CARE
SLP: ST orders received and evaluation attempted. Pt alert, however, unable to follow commands. Given attempt for a dry swallow, significant congestion noted and O2 stats dropped to mid-70s. Pt was unable to complete a swallow and cough not productive. O2 improved to 90s with time. Recommend: strict NPO with continued excellent oral cares.    Pt's mom/caregiver present and reviewed extensive history of SLP services, profound dysphagia, recurrent pneumonia with frequent hospitalizations and no improvement in swallow function despite home health SLP services. Mom has great knowledge on exercises. Mom questioned if pt can have another Video Swallow Study as she would like pt to eat again. Mom reported that if pt was able to eat again (has been strict NPO for 2.5 years) that pt will be able to improve and avoid hospitalizations. Discussed the pt is not able to swallow consistently and is likely aspirating secretions. Mom would like more options and education for deep suctioning at home. Consider RT consult for suctioning questions?? Mom in agreement that PO trials or a Video Swallow Study is not appropriate until pt can remain stable without recurrent pneumonias. Mom insistent that pt will be able to eat again and dismissed the topic that pt may never improve to where he was 2.5 years ago. Inpatient SLP services are not indicated at this time and do not anticipate any functional improvement based on pt's complex medical history and profound dysphagia. Per chart review, palliative care has been introduced, however, mom has refused. Will defer further discussion to MD on mom's questions on how to keep pt healthy and out of the hospital.

## 2019-03-10 NOTE — PROGRESS NOTES
[facility]    Hospitalist Progress Note    Date of Service (when I saw the patient): 03/10/2019    Assessment & Plan   Keyon Farias is a 58 year old male who was admitted on 3/9/2019.  Assessment & Plan     Keyon Farias is a 58 year old male with PMH significant for TBI with R side hemiplegia, dysphagia 2/2 TBI s/p GJ tube, seizure disorder, panhypopituitarism, recurrent aspiration PNA, UTI with pseudomonas and E.coli, chronic normocytic anemia, GERD, hypothyroidism, history of MRSA/VRE admitted on 3/9/2019. He presents with fever, nausea, vomiting, 1 week history loose stools.     Severe sepsis, unknown origin most likely from aspiration event PTA  Nausea, vomiting, loose stools.  History of recurrent aspiration pneumonia.  History of pseudomonas, E.coli UTI.  - Pt has had multiple readmissions (11/2/18, 11/22/18, 12/18/18, 1/14/19, 1/31/19, 2/20/19, 2/27/19) for recurrent aspiration PNA; most recently discharged 3/4/19.  Pt with notable pseudomonas and E.coli UTI  1/14/19.  CXR on admission today grossly clear, no obvious pleural effusions.  Negative influenza A/B.  UA remarkably WNL.  WBC 21.5, lactic acid 3.1 on admission.  Blood cultures x2 and urine culture NGTD.  Pt received 2L IVF bolus, started on vancomycin and zosyn.  Pt's mother/guardian notes 1 week history loose stools while receiving antibiotics.  Pt's GJ tube was found to have a cut in the tubing; therefore, a temporary G tube was placed 3/5 and a GJ tube was placed by IR on 3/8.  - Uptrending lactic acid 3.1>3.5; ED ordered for an additional 500 ml NS bolus.  Will repeat lactic acid at 0000.  - WBC 21.5, improved to 14k today .  -c.diff returned negative and blood cultures and urine culture negative   - MIVF  ml/hr  - Pharmacy to dose vancomycin; zosyn. Will stop vancomycin today. Continue with zosyn   - Will repeat BMP in AM to ensure stability of electrolytes in setting of N/V; electrolyte protocol ordered     H/o TBI with  right sided hemiplegia   Dysphagia 2/2 TBI s/p GJ tube  Panhypopituitarism   Seizure disorder 2/2 TBI  Hypothyroidism  Stable at this time.  No signs of adrenal insufficiency at this time.  - PTA Brivaracetam, Tegretol  - PTA Synthroid 137 mcg  - PTA Hydrocortisone  - Speech consulted; appreciate recommendations.  Pt to remain NPO until seen by speech.  Please perform oral cares frequently.  - Previously ordered TF regimen; to be confirmed with mother/guardian prior to initiation.  Isosource 1.5 @ 100 mL/hr x 12 hrs (7am-7pm), Free Water Flush: 60 mL every 4 hrs (while on IVF; to increase to 120 ml when IVF discontinued).    Nutrition services consulted Re starting tube feeds      Chronic anemia.  - Baseline hgb 10-12, on admission 13.9.     GERD.  - PTA pantoprazole     Recent penile pressure injury.  - Pericare     History of MRSA, VRE.     Pain Assessment:  Current Pain Score 3/9/2019   Patient currently in pain? HECTOR   Pain score (0-10) -   Pain location -   Pain descriptors -   [facility] pain score -   Keyon liriano pain level was assessed and he currently denies pain.         Diet: TF, pending confirmation  DVT Prophylaxis: Pneumatic Compression Devices  Lerma Catheter: not present  Code Status: Full code; confirmed with pt's mother/guardian over the phone        Disposition Plan;  In the next 2-3days if remains stable       Starr Champion MD  [phone] (P)      Interval History   diarrhoea resolving only one episode this AM. Fevers coming down. Wbc count improving     -Data reviewed today: I reviewed all new labs and imaging results over the last 24 hours. I personally reviewed no images or EKG's today.    Physical Exam   Temp: 99  F (37.2  C) Temp src: Oral BP: 118/59 Pulse: 117 Heart Rate: 70 Resp: 16 SpO2: 98 % O2 Device: None (Room air)    Vitals:    03/10/19 1100   Weight: 78.3 kg (172 lb 9.6 oz)     Vital Signs with Ranges  Temp:  [97.9  F (36.6  C)-102.7  F (39.3  C)] 99  F (37.2  C)  Pulse:  [103-117]  117  Heart Rate:  [] 70  Resp:  [10-22] 16  BP: (101-118)/(58-78) 118/59  SpO2:  [95 %-100 %] 98 %  I/O last 3 completed shifts:  In: 500 [I.V.:500]  Out: 350 [Urine:350]    Constitutional: Awake, alert, cooperative, no apparent distress  Respiratory: Clear to auscultation bilaterally, no crackles or wheezing  Cardiovascular: Regular rate and rhythm, normal S1 and S2, and no murmur noted  GI: Normal bowel sounds, soft, non-distended, non-tender  Skin/Integumen: No rashes, no cyanosis, contractures of extremities noted   Other:     Medications     IV fluid REPLACEMENT ONLY       lactated ringers 100 mL/hr at 03/10/19 1151       bacitracin   Topical Daily     Brivaracetam  100 mg Per G Tube BID     carBAMazepine  150 mg Per G Tube Q6H     fiber modular (NUTRISOURCE FIBER)  1 packet Per G Tube Daily     hydrocortisone  10 mg Per G Tube Daily with supper     hydrocortisone  20 mg Per G Tube QAM     levothyroxine  137 mcg Per G Tube Daily     melatonin  6 mg Per G Tube At Bedtime     multivitamin w/minerals  1 tablet Oral Daily     pantoprazole  20 mg Per G Tube BID     piperacillin-tazobactam  3.375 g Intravenous Q6H     potassium & sodium phosphates  1 packet Oral TID     saccharomyces boulardii  250 mg Oral or Feeding Tube Daily     sodium chloride (PF)  3 mL Intracatheter Q8H     vancomycin (VANCOCIN) IV  1,000 mg Intravenous Q8H     vitamin D3  2,000 Units Oral QAM       Data   Recent Labs   Lab 03/10/19  0734 03/09/19 2000 03/04/19  0721   WBC 14.3* 21.5* 10.4   HGB 10.5* 13.9 12.6*   MCV 82 82 82   * 166 239    135 138   POTASSIUM 3.6 3.9 3.6   CHLORIDE 106 97 106   CO2 27 32 27   BUN 12 19 17   CR 0.76 0.82 0.98   ANIONGAP 4 6 5   STEVE 7.9* 8.9 8.4*   * 129* 118*   ALBUMIN  --  3.5  --    PROTTOTAL  --  8.7  --    BILITOTAL  --  0.2  --    ALKPHOS  --  103  --    ALT  --  38  --    AST  --  23  --        Recent Results (from the past 24 hour(s))   XR Chest 2 Views    Narrative    CHEST  TWO VIEW   3/9/2019 8:40 PM     HISTORY: Sepsis, history of aspiration pneumonia.    COMPARISON: Chest x-ray 2/27/2019.      Impression    IMPRESSION: Two views of the chest are performed. Lungs are  hypoaerated but grossly clear. Heart is normal in size. No  pneumothorax or obvious pleural effusions. Lateral view is limited due  to hypoaeration of the lungs. No obvious interval change since prior  exam.    GEE BOWMAN MD   XR Abdomen Port 1 View    Narrative    ABDOMEN SINGLE LEFT LATERAL DECUBITUS VIEW PORTABLE   3/10/2019 12:20  AM     HISTORY: Recent gastrojejunostomy tube exchange, now with severe  sepsis. Evaluate for free air.    COMPARISON: None.      Impression    IMPRESSION:   1. No convincing free intraperitoneal gas.  2. A percutaneous balloon-tipped gastrojejunostomy tube projects over  the upper left hemiabdomen.    ELODIA ANN MD

## 2019-03-10 NOTE — ED NOTES
"[facility]  ED Nurse Handoff Report    ED Chief complaint: Fever and Nausea & Vomiting (Caretaker reports fever and vomiting. Recent pneumonia.)      ED Diagnosis:   Final diagnoses:   SIRS (systemic inflammatory response syndrome) (H)   Elevated lactic acid level       Code Status: Full Code    Allergies:   Allergies   Allergen Reactions     Dilantin [Phenytoin Sodium]      Valproic Acid      Toxicity c bone marrow suspension, elevated ammonia levels        Activity level - Baseline/Home:  Total Care    Activity Level - Current:   Total Care     Needed?: No    Isolation: Yes  Infection:  MRSA  VRE  Bariatric?: No    Vital Signs:   Vitals:    03/09/19 2029 03/09/19 2105 03/09/19 2142 03/09/19 2153   BP:  115/78  109/68   Pulse:  103  117   Resp: 22   10   Temp:   99.6  F (37.6  C)    TempSrc:   Oral    SpO2:    95%       Cardiac Rhythm: ,        Pain level:      Is this patient confused?: Yes   Does this patient have a guardian?  Yes         If yes, is there guardianship documents in the Epic \"Code/ACP\" activity?  Yes         Guardian Notified?  Yes  [place] - Suicide Severity Rating Scale Completed?  No, due to TBI, pt is nonverbal.  If yes, what color did the patient score?  N/A    Patient Report: Initial Complaint: Pt presents via EMS with reports of a fever and vomiting. Mother also reports a coughing fit this morning and suspects he may have aspirated, recent dx of pneumonia.  Focused Assessment: Small amount of froth at the mouth, no emesis while in the ED. Pt fever was 102.7 orally and was reduced to 99.6 with Tylenol. Pt is nonverbal but is believed to be at his neurological baseline with a hx of TBI.  Tests Performed: Labs, Flu swab, Chest XR, UA  Abnormal Results:   Labs Ordered and Resulted from Time of ED Arrival Up to the Time of Departure from the ED   CBC WITH PLATELETS DIFFERENTIAL - Abnormal; Notable for the following components:       Result Value    WBC 21.5 (*)     " Absolute Neutrophil 16.1 (*)     All other components within normal limits   COMPREHENSIVE METABOLIC PANEL - Abnormal; Notable for the following components:    Glucose 129 (*)     All other components within normal limits   LACTIC ACID WHOLE BLOOD - Abnormal; Notable for the following components:    Lactic Acid 3.1 (*)     All other components within normal limits   ROUTINE UA WITH MICROSCOPIC - Abnormal; Notable for the following components:    Glucose Urine 150 (*)     Protein Albumin Urine 30 (*)     All other components within normal limits   LACTIC ACID WHOLE BLOOD - Abnormal; Notable for the following components:    Lactic Acid 3.5 (*)     All other components within normal limits   PULSE OXIMETRY NURSING   CARDIAC CONTINUOUS MONITORING   MEASURE URINE OUTPUT   PATIENT CARE ORDER   URINE CULTURE AEROBIC BACTERIAL   BLOOD CULTURE   BLOOD CULTURE   INFLUENZA A/B ANTIGEN       Treatments provided: 1L NS, 1L LR, Zosyn, Vancomycin, 1000 mg Tylenol per G tube, Breviact    Family Comments: Mother is legal guardian, and is available for updates [phone].    OBS brochure/video discussed/provided to patient/family: N/A       ED Medications:   Medications   lactated ringers BOLUS 2,178 mL (not administered)   lactated ringers BOLUS 1,000 mL (1,000 mLs Intravenous New Bag 3/9/19 2130)     Followed by   lactated ringers infusion (not administered)   vancomycin (VANCOCIN) 1,500 mg in sodium chloride 0.9 % 250 mL intermittent infusion (1,500 mg Intravenous New Bag 3/9/19 2139)   0.9% sodium chloride BOLUS (not administered)   piperacillin-tazobactam (ZOSYN) 4.5 g vial to attach to  mL bag (0 g Intravenous Stopped 3/9/19 2141)   acetaminophen (TYLENOL) solution 1,000 mg (1,000 mg Per Feeding Tube Given 3/9/19 2046)   0.9% sodium chloride BOLUS (0 mLs Intravenous Stopped 3/9/19 2200)   Brivaracetam (BRIVIACT) solution 100 mg (100 mg Per G Tube Given 3/9/19 2209)       Drips infusing?:  Yes    For the majority of the  shift this patient was Green.   Interventions performed were Speaking to pt, rearranging pillows and bed for comfort.    Severe Sepsis OR Septic Shock Diagnosis Present: No    To be done/followed up on inpatient unit:      ED NURSE PHONE NUMBER: [phone]

## 2019-03-10 NOTE — ED PROVIDER NOTES
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 The patient was most recently admitted for sepsis due to aspiration pneumonia on 2/27 and discharged on 3/4 on Augmentin.  Patient is unable to provide any history due to nonverbal baseline.    Allergies:  Dilantin  Valproic acid     Medications:    Albuterol neb  Augmentin  Briviact  Tegretol  Cortef  Levothyroxine  Protonix  Neutra-phose  Florastor  Depotestosterone cypionate    Past Medical History:    Recurrent sepsis due to aspiration pneumonia  Encephalopathy  Necrotizing pancreatitis  PEG tube malfunction  Epilepsy  Panhypopituitarism    TBI w/ aphasia and spastic hemiplegia  GERD  DVT of upper extremity  Cerebral artery occlusion with cerebral infarction    Past Surgical History:    UGI endoscopy  EGD Necrosectomy   EGD multiple  Reconstructive facial surgery  IR jejunostomy changes  G-tube placement  Appendectomy  Tracheostomy  Right hand repair    Family History:    History reviewed. No pertinent family history.      Social History:  Smoking status: Former  Alcohol use: No  Drug use: No  Patient presents with mother.  PCP: Carlos Gomez    Marital Status:  Single     Review of Systems   Unable to perform ROS: Patient nonverbal   Constitutional: Positive for fever.   Gastrointestinal: Positive for vomiting.       Physical Exam     Patient Vitals for the past 24 hrs:   BP Temp Temp src Pulse Heart Rate Resp SpO2   03/09/19 2303 -- -- -- -- 111 16 96 %   03/09/19 2153 109/68 -- -- 117 120 10 95 %   03/09/19 2142 -- 99.6  F (37.6  C) Oral -- -- -- --   03/09/19 2105 115/78 -- -- 103 -- -- --   03/09/19 2029 -- -- -- -- 130 22 --   03/09/19 2017 -- -- -- -- 135  16 --   03/09/19 1938 -- 102.7  F (39.3  C) Oral -- -- -- --   03/09/19 1934 117/68 -- -- -- 134 20 100 %      Physical Exam  Eyes:               Sclera white; Pupils are equal and round  ENT:                External ears and nares normal  CV:                  Rate as above with regular rhythm; pronounced murmur LUSB  Resp:               Breath sounds clear and equal bilaterally  GI:                   Abdomen is soft, non-tender, non-distended; G tube in place  MS:                  Moves all extremities  Skin:                Warm and hot  Neuro:             Nonverbal, looking at me, eyes open, mouth opening, making some sounds    Emergency Department Course     ECG (20:16:56):  Rate 132 bpm. CA interval 138. QRS duration 88. QT/QTc 316/468. P-R-T axes 60 -68 58. Sinus tachycardia. Left anterior fascicular block. No significant change when compared to EKG dated 12/21/18. Interpreted at 2030 by Jennie Arguelles MD.     Imaging:  Radiographic findings were communicated with the patient and Admitting MD who voiced understanding of the findings.    X-ray Chest, 2 views:  Two views of the chest are performed. Lungs are  hypoaerated but grossly clear. Heart is normal in size. No  pneumothorax or obvious pleural effusions. Lateral view is limited due  to hypoaeration of the lungs. No obvious interval change since prior  exam.  Result per radiology.     Laboratory:  CBC: WBC 21.5 (H), o/w WNL (HGB 13.9, )  CMP: Glucose 129 (H), o/w WNL (Creatinine 0.82)  2021 Lactic Acid: 3.1 (H)  Blood Culture x2: Pending   Influenza: Negative  2236 Lactic Acid: 3.5 (H)    UA: Glucose 150, Albumin 30, o/w Negative   Urine Culture: Pending    Interventions:  2010 - NS 1 L IV Bolus  2046 - Tylenol 1 g IV  2130 - LR 1 L IV Bolus    2102 - Zosyn 4.5 g IV  2139 - Vancomycin 1500 mg IV  2209 - Briviact 200 mg IV  2333 - LR 1 L IV Bolus    Emergency Department Course:  Past medical records, nursing notes, and vitals reviewed.  1953:  I performed an exam of the patient and obtained history, as documented above.    IV inserted and blood drawn. This was sent to laboratory for testing, findings above.  The patient provided a urine sample here in the emergency department. This was sent for laboratory testing, findings above.     The patient was sent for a chest x-ray  while in the emergency department, findings above.     Findings and plan explained to the mother who consents to admission.     Discussed the patient with Dr. Jenkins, who will admit the patient for further monitoring, evaluation, and treatment.      Impression & Plan       Medical Decision Making:  Keyon Farias is a 58 year old male who is here for evaluation of fevers. He arrives unable to provide any of this history as he came by paramedics. He is medically complex with multiple recent admissions including for aspiration pneumonia. High suspicion for recurrent bacterial illness though none is definitively identified on work-up.  Sepsis treatment initiated early in ED course.  Lactic acid elevation c/w severe sepsis.  At this point in time, etiology is not clear as there is no obvious pneumonia, cellulitis, or urinary tract infection. Influenza testing was negative. He received broad spectrum antibiotics early in his course after a liter of IV fluid. Suspecting recurrent aspiration with difficult visualization on CXR due to lack of deep inspiration.    Diagnosis:    ICD-10-CM    1. SIRS (systemic inflammatory response syndrome) (H) R65.10 Urine Culture Aerobic Bacterial     Lactic acid   2. Elevated lactic acid level R79.89      Disposition:  Admitted to the hospital.     Rikki Landeros  3/9/2019    EMERGENCY DEPARTMENT    Scribe Disclosure:  Rikki DYKES Chi, am serving as a scribe at 7:53 PM on 3/9/2019 to document services personally performed by Jennie Arguelles MD based on my observations and the provider's statements to me.        Jennie Arguelles MD  03/10/19 2874

## 2019-03-11 LAB
ANION GAP SERPL CALCULATED.3IONS-SCNC: 4 MMOL/L (ref 3–14)
BUN SERPL-MCNC: 10 MG/DL (ref 7–30)
CALCIUM SERPL-MCNC: 8 MG/DL (ref 8.5–10.1)
CHLORIDE SERPL-SCNC: 107 MMOL/L (ref 94–109)
CO2 SERPL-SCNC: 28 MMOL/L (ref 20–32)
CREAT SERPL-MCNC: 0.72 MG/DL (ref 0.66–1.25)
ERYTHROCYTE [DISTWIDTH] IN BLOOD BY AUTOMATED COUNT: 14.5 % (ref 10–15)
GFR SERPL CREATININE-BSD FRML MDRD: >90 ML/MIN/{1.73_M2}
GLUCOSE BLDC GLUCOMTR-MCNC: 206 MG/DL (ref 70–99)
GLUCOSE SERPL-MCNC: 90 MG/DL (ref 70–99)
HCT VFR BLD AUTO: 32.2 % (ref 40–53)
HGB BLD-MCNC: 10.5 G/DL (ref 13.3–17.7)
MAGNESIUM SERPL-MCNC: 1.9 MG/DL (ref 1.6–2.3)
MCH RBC QN AUTO: 26.6 PG (ref 26.5–33)
MCHC RBC AUTO-ENTMCNC: 32.6 G/DL (ref 31.5–36.5)
MCV RBC AUTO: 82 FL (ref 78–100)
PHOSPHATE SERPL-MCNC: 2.4 MG/DL (ref 2.5–4.5)
PLATELET # BLD AUTO: 117 10E9/L (ref 150–450)
POTASSIUM SERPL-SCNC: 3.9 MMOL/L (ref 3.4–5.3)
RBC # BLD AUTO: 3.94 10E12/L (ref 4.4–5.9)
SODIUM SERPL-SCNC: 139 MMOL/L (ref 133–144)
WBC # BLD AUTO: 7.5 10E9/L (ref 4–11)

## 2019-03-11 PROCEDURE — 25000128 H RX IP 250 OP 636: Performed by: NURSE PRACTITIONER

## 2019-03-11 PROCEDURE — 99207 ZZC CDG-MDM COMPONENT: MEETS LOW - DOWN CODED: CPT | Performed by: INTERNAL MEDICINE

## 2019-03-11 PROCEDURE — 85027 COMPLETE CBC AUTOMATED: CPT | Performed by: INTERNAL MEDICINE

## 2019-03-11 PROCEDURE — A9270 NON-COVERED ITEM OR SERVICE: HCPCS | Mod: GY | Performed by: INTERNAL MEDICINE

## 2019-03-11 PROCEDURE — 25000132 ZZH RX MED GY IP 250 OP 250 PS 637: Mod: GY | Performed by: INTERNAL MEDICINE

## 2019-03-11 PROCEDURE — A9270 NON-COVERED ITEM OR SERVICE: HCPCS | Mod: GY | Performed by: NURSE PRACTITIONER

## 2019-03-11 PROCEDURE — 25000132 ZZH RX MED GY IP 250 OP 250 PS 637: Mod: GY | Performed by: NURSE PRACTITIONER

## 2019-03-11 PROCEDURE — 36415 COLL VENOUS BLD VENIPUNCTURE: CPT | Performed by: INTERNAL MEDICINE

## 2019-03-11 PROCEDURE — 25000125 ZZHC RX 250: Performed by: NURSE PRACTITIONER

## 2019-03-11 PROCEDURE — G0463 HOSPITAL OUTPT CLINIC VISIT: HCPCS

## 2019-03-11 PROCEDURE — 83735 ASSAY OF MAGNESIUM: CPT | Performed by: INTERNAL MEDICINE

## 2019-03-11 PROCEDURE — 84100 ASSAY OF PHOSPHORUS: CPT | Performed by: INTERNAL MEDICINE

## 2019-03-11 PROCEDURE — 27210429 ZZH NUTRITION PRODUCT INTERMEDIATE LITER

## 2019-03-11 PROCEDURE — 12000000 ZZH R&B MED SURG/OB

## 2019-03-11 PROCEDURE — 99232 SBSQ HOSP IP/OBS MODERATE 35: CPT | Performed by: INTERNAL MEDICINE

## 2019-03-11 PROCEDURE — 00000146 ZZHCL STATISTIC GLUCOSE BY METER IP

## 2019-03-11 PROCEDURE — 80048 BASIC METABOLIC PNL TOTAL CA: CPT | Performed by: INTERNAL MEDICINE

## 2019-03-11 PROCEDURE — 25000128 H RX IP 250 OP 636: Performed by: INTERNAL MEDICINE

## 2019-03-11 RX ORDER — FUROSEMIDE 10 MG/ML
20 INJECTION INTRAMUSCULAR; INTRAVENOUS ONCE
Status: COMPLETED | OUTPATIENT
Start: 2019-03-11 | End: 2019-03-11

## 2019-03-11 RX ORDER — GLYCOPYRROLATE 1 MG/1
1-2 TABLET ORAL 2 TIMES DAILY PRN
Status: DISCONTINUED | OUTPATIENT
Start: 2019-03-11 | End: 2019-03-12

## 2019-03-11 RX ADMIN — CARBAMAZEPINE 150 MG: 100 SUSPENSION ORAL at 21:31

## 2019-03-11 RX ADMIN — BACITRACIN: 500 OINTMENT TOPICAL at 10:33

## 2019-03-11 RX ADMIN — FUROSEMIDE 20 MG: 10 INJECTION, SOLUTION INTRAVENOUS at 15:04

## 2019-03-11 RX ADMIN — CARBAMAZEPINE 150 MG: 100 SUSPENSION ORAL at 10:00

## 2019-03-11 RX ADMIN — AMOXICILLIN AND CLAVULANATE POTASSIUM 1 TABLET: 875; 125 TABLET, FILM COATED ORAL at 21:31

## 2019-03-11 RX ADMIN — VITAMIN D 2000 UNITS: 25 TAB ORAL at 10:01

## 2019-03-11 RX ADMIN — Medication 250 MG: at 10:01

## 2019-03-11 RX ADMIN — BRIVARACETAM 100 MG: 10 SOLUTION ORAL at 21:31

## 2019-03-11 RX ADMIN — MULTIPLE VITAMINS W/ MINERALS TAB 1 TABLET: TAB at 10:01

## 2019-03-11 RX ADMIN — POTASSIUM & SODIUM PHOSPHATES POWDER PACK 280-160-250 MG 1 PACKET: 280-160-250 PACK at 21:32

## 2019-03-11 RX ADMIN — SODIUM BICARBONATE 20 MG: 84 INJECTION, SOLUTION INTRAVENOUS at 21:31

## 2019-03-11 RX ADMIN — CARBAMAZEPINE 150 MG: 100 SUSPENSION ORAL at 01:25

## 2019-03-11 RX ADMIN — LEVOTHYROXINE SODIUM 137 MCG: 137 TABLET ORAL at 10:02

## 2019-03-11 RX ADMIN — POTASSIUM & SODIUM PHOSPHATES POWDER PACK 280-160-250 MG 1 PACKET: 280-160-250 PACK at 10:01

## 2019-03-11 RX ADMIN — HYDROCORTISONE 10 MG: 10 TABLET ORAL at 16:08

## 2019-03-11 RX ADMIN — CARBAMAZEPINE 150 MG: 100 SUSPENSION ORAL at 15:04

## 2019-03-11 RX ADMIN — SODIUM BICARBONATE 20 MG: 84 INJECTION, SOLUTION INTRAVENOUS at 10:00

## 2019-03-11 RX ADMIN — HYDROCORTISONE 20 MG: 20 TABLET ORAL at 10:01

## 2019-03-11 RX ADMIN — BRIVARACETAM 100 MG: 10 SOLUTION ORAL at 10:01

## 2019-03-11 RX ADMIN — PIPERACILLIN SODIUM,TAZOBACTAM SODIUM 3.38 G: 3; .375 INJECTION, POWDER, FOR SOLUTION INTRAVENOUS at 02:48

## 2019-03-11 RX ADMIN — POTASSIUM & SODIUM PHOSPHATES POWDER PACK 280-160-250 MG 1 PACKET: 280-160-250 PACK at 16:08

## 2019-03-11 RX ADMIN — MELATONIN 6 MG: 3 TAB ORAL at 21:32

## 2019-03-11 RX ADMIN — PIPERACILLIN SODIUM,TAZOBACTAM SODIUM 3.38 G: 3; .375 INJECTION, POWDER, FOR SOLUTION INTRAVENOUS at 09:59

## 2019-03-11 ASSESSMENT — ACTIVITIES OF DAILY LIVING (ADL)
ADLS_ACUITY_SCORE: 45

## 2019-03-11 NOTE — PLAN OF CARE
Pt nonverbal, calm and cooperative. VSS. Turned A2 q2hr. Voiding frequently and large amounts. NPO. Pt gets feeding through G tube during day. WOC consult today for wound. Continue to monitor.

## 2019-03-11 NOTE — PLAN OF CARE
Pt alert, HECTOR orientation due to pt being nonverbal. IV SL. IV abx changed to oral today. Pt gives no nonverbal indicators of pain. Turn/repo Q2H, incontinent. Total cares. Pt has G/J tube, TF running at 100 ml/hr, started at 1400 for 12 hours. Pt NPO due to aspiration risk. Continuous pulse ox, satting in high 90s. Lung sounds diminished, tele NSR. Discharge pending. Continue to monitor.

## 2019-03-11 NOTE — PROGRESS NOTES
[facility] Nurse Inpatient Wound Assessment     Initial Assessment of wound(s) on pt's:   Coccyx        Data:   Patient History:      per MD note(s): Keyon Farias is a 58 year old male with PMH significant for TBI with R side hemiplegia, dysphagia 2/2 TBI s/p GJ tube, seizure disorder, panhypopituitarism, recurrent aspiration PNA, UTI with pseudomonas and E.coli, chronic normocytic anemia, GERD, hypothyroidism, history of MRSA/VRE admitted on 3/9/2019. He presents with fever, nausea, vomiting, 1 week history loose stools.       Ricci Risk Assessment  Sensory Perception: 2-->very limited    Moisture: 3-->occasionally moist   Activity: 1-->bedfast     Mobility: 2-->very limited   Nutrition: 2-->probably inadequate   Ricci Score: 12      Positioning: Pillows    Mattress:  Standard , Atmos Air mattress    Moisture Management:  Incontinence Protocol and Diaper    Catheter secured? Not applicable    Current Diet / Nutrition:       Orders Placed This Encounter        NPO for Medical/Clinical Reasons Except for: No Exceptions        Labs:   Recent Labs   Lab Test 03/11/19  0724  03/09/19 2000 01/31/19  1324  11/22/18  1438  02/07/17  0452   ALBUMIN  --   --  3.5   < > 2.9*   < >  --    < >  --    HGB 10.5*   < > 13.9   < > 12.0*   < >  --    < > 9.4*   INR  --   --   --   --   --   --   --   --  1.27*   WBC 7.5   < > 21.5*   < > 13.6*   < >  --    < > 11.7*   A1C  --   --   --   --   --   --  6.3*   < >  --    CRP  --   --   --   --  71.1*  --   --   --   --     < > = values in this interval not displayed.       Wound Assessment (location):   Coccyx   Wound History:  Pt well-known to [facility] service, seen on many previous admissions including just over a week ago.  Pt rolls well onto side with assist of 1-2.      Wound Base: dry dark pink tissue    Specific Dimensions (length x width x depth, in cm) :  couple of small wounds within an approx 1.5 x 0.5 x 0cm area     Palpation of the wound bed:   normal    Slough appearance:  none    Eschar appearance:  none    Periwound Skin: intact and blanchable erythema; mild rash/denudement to inner thighs/groin    Color: pink    Temperature  normal     Drainage:  None noted     Odor: none    Pain:  minimal      3-11-19 [facility] photo - coccyx      2-28-19      Penis - previous superficial linear wound r/t condom cath has basically resolved - no open areas noted, mild redness only          Intervention:     Patient's chart evaluated.      Wound(s) assessed    Wound Care: completed    Orders  Written    Supplies  reviewed    Discussed plan of care with Patient and Nurse          Assessment:          Coccyx with small area of superficial breakdown, likely Stage 2 pressure injury mixed with friction/moisture, present on admission.  Pt reportedly with loose stools for past week.          Plan:     Nursing to notify the Provider(s) and re-consult the [facility] Nurse if wound(s) deteriorate(s) or if the wound care plan needs reevaluation.      Skin/wound care to coccyx: BID and prn:  1.  Cleanse gently with mild skin cleanser, ie Rita or bath wipes.    2.  Apply antifungal powder to gluteal cleft and groin/inner thighs, rub in well.    3.  Apply thin glaze of barrier paste to perineal area/coccyx prn, if having loose stools or skin irritated.    4.  Leave coccyx open to area - no dressings, as will just trap moisture.    5.  PIP measures.  Reposition every 1-2 hrs, side to side only.  HOB as low as tolerated.      [facility] Nurse will return: weekly and prn

## 2019-03-11 NOTE — PROGRESS NOTES
[facility]    Hospitalist Progress Note    Date of Service (when I saw the patient): 03/11/2019    Assessment & Plan   Keyon Farias is a 58 year old male who was admitted on 3/9/2019.  Assessment & Plan     Keyon Farias is a 58 year old male with PMH significant for TBI with R side hemiplegia, dysphagia 2/2 TBI s/p GJ tube, seizure disorder, panhypopituitarism, recurrent aspiration PNA, UTI with pseudomonas and E.coli, chronic normocytic anemia, GERD, hypothyroidism, history of MRSA/VRE admitted on 3/9/2019. He presents with fever, nausea, vomiting, 1 week history loose stools.     Severe sepsis, unknown origin most likely from aspiration event PTA  Nausea, vomiting, loose stools.  History of recurrent aspiration pneumonia.  History of pseudomonas, E.coli UTI.  - Pt has had multiple readmissions (11/2/18, 11/22/18, 12/18/18, 1/14/19, 1/31/19, 2/20/19, 2/27/19) for recurrent aspiration PNA; most recently discharged 3/4/19.  Pt with notable pseudomonas and E.coli UTI  1/14/19.  CXR on admission today grossly clear, no obvious pleural effusions.  Negative influenza A/B.  UA remarkably WNL.  WBC 21.5, lactic acid 3.1 on admission.  Blood cultures x2 and urine culture NGTD.  Pt received 2L IVF bolus, started on vancomycin and zosyn.  Pt's mother/guardian notes 1 week history loose stools while receiving antibiotics.  Pt's GJ tube was found to have a cut in the tubing; therefore, a temporary G tube was placed 3/5 and a GJ tube was placed by IR on 3/8  - WBC 21.5, improved to 14k to 7.5k today   -c.diff returned negative and blood cultures and urine culture negative   - MIVF  ml/hr  - Pharmacy to dose vancomycin; zosyn.stopped  vancomycin on 3/10/19. Continue with zosyn   Will switch zosyn to oral augmentin today   As per his MOM he aspirates his own thick secretions will request pulmonary consult to see if there is anything to help with it      H/o TBI with right sided hemiplegia   Dysphagia 2/2 TBI  s/p GJ tube  Panhypopituitarism   Seizure disorder 2/2 TBI  Hypothyroidism  Stable at this time.  No signs of adrenal insufficiency at this time.  - PTA Brivaracetam, Tegretol  - PTA Synthroid 137 mcg  - PTA Hydrocortisone  - Speech consulted; appreciate recommendations.  Pt to remain NPO until seen by speech.  Please perform oral cares frequently.  - Previously ordered TF regimen; to be confirmed with mother/guardian prior to initiation.  Isosource 1.5 @ 100 mL/hr x 12 hrs (7am-7pm), Free Water Flush: 60 mL every 4 hrs (while on IVF; to increase to 120 ml when IVF discontinued).    Nutrition services consulted Re starting tube feeds      Chronic anemia.  - Baseline hgb 10-12, on admission 13.9.     GERD.  - PTA pantoprazole     Recent penile pressure injury.  - Pericare     History of MRSA, VRE.     Pain Assessment:  Current Pain Score 3/9/2019   Patient currently in pain? HECTOR   Pain score (0-10) -   Pain location -   Pain descriptors -   [facility] pain score -   Keyon liriano pain level was assessed and he currently denies pain.         Diet: TF, pending confirmation  DVT Prophylaxis: Pneumatic Compression Devices  Lerma Catheter: not present  Code Status: Full code; confirmed with pt's mother/guardian over the phone        Disposition Plan;  In the next 2-3days if remains stable       Starr Champion MD  [phone] (P)      Interval History   No new issues .  Wbc count improving     -Data reviewed today: I reviewed all new labs and imaging results over the last 24 hours. I personally reviewed no images or EKG's today.    Physical Exam   Temp: 98.3  F (36.8  C) Temp src: Axillary BP: 135/66 Pulse: 77 Heart Rate: 62 Resp: 16 SpO2: 99 % O2 Device: None (Room air)    Vitals:    03/10/19 1100 03/11/19 0129   Weight: 78.3 kg (172 lb 9.6 oz) 77.6 kg (171 lb)     Vital Signs with Ranges  Temp:  [98.3  F (36.8  C)-98.6  F (37  C)] 98.3  F (36.8  C)  Pulse:  [77-91] 77  Heart Rate:  [62-97] 62  Resp:  [16-20] 16  BP:  (119-135)/(54-66) 135/66  SpO2:  [94 %-99 %] 99 %  I/O last 3 completed shifts:  In: 2400 [I.V.:2400]  Out: -     Constitutional: Awake, alert, cooperative, no apparent distress  Respiratory: Clear to auscultation bilaterally, bilateral crackles heard   Cardiovascular: Regular rate and rhythm, normal S1 and S2, and no murmur noted  GI: Normal bowel sounds, soft, non-distended, non-tender  Skin/Integumen: No rashes, no cyanosis, contractures of extremities noted   Other:     Medications     IV fluid REPLACEMENT ONLY         amoxicillin-clavulanate  1 tablet Oral Q12H ALONDRA     bacitracin   Topical Daily     Brivaracetam  100 mg Per G Tube BID     carBAMazepine  150 mg Per G Tube Q6H     fiber modular (NUTRISOURCE FIBER)  1 packet Per G Tube Daily     hydrocortisone  10 mg Per G Tube Daily with supper     hydrocortisone  20 mg Per G Tube QAM     levothyroxine  137 mcg Per G Tube Daily     melatonin  6 mg Per G Tube At Bedtime     multivitamin w/minerals  1 tablet Oral Daily     pantoprazole  20 mg Per G Tube BID     potassium & sodium phosphates  1 packet Oral TID     saccharomyces boulardii  250 mg Oral or Feeding Tube Daily     sodium chloride (PF)  3 mL Intracatheter Q8H     vitamin D3  2,000 Units Oral QAM       Data   Recent Labs   Lab 03/11/19  0724 03/10/19  0734 03/09/19 2000   WBC 7.5 14.3* 21.5*   HGB 10.5* 10.5* 13.9   MCV 82 82 82   * 127* 166    137 135   POTASSIUM 3.9 3.6 3.9   CHLORIDE 107 106 97   CO2 28 27 32   BUN 10 12 19   CR 0.72 0.76 0.82   ANIONGAP 4 4 6   STEVE 8.0* 7.9* 8.9   GLC 90 109* 129*   ALBUMIN  --   --  3.5   PROTTOTAL  --   --  8.7   BILITOTAL  --   --  0.2   ALKPHOS  --   --  103   ALT  --   --  38   AST  --   --  23       No results found for this or any previous visit (from the past 24 hour(s)).

## 2019-03-11 NOTE — CONSULTS
"BRIEF NUTRITION ASSESSMENT      REASON FOR ASSESSMENT:  Keyon Farias is a 58 year old male seen by Registered Dietitian for Provider Order - verbal order to assess TF and possible modify TF schedule in the context of aspiration    -Nutrition currently following pt, for full nutrition assessment, please see RD note on 3/10  -Per Chart review, G-tube was converted into G/J tube on 3/8/2019. Suspect now that enteral nutrition is being delivered post-pyloric, risk of aspiration on TF is reduced and no need to change TF schedule at this time. Will continue to monitor     CURRENT DIET:  Diet: NPO  Nutrition support:  Nutrition Support Enteral:  Type of Feeding Tube: J-tube  Enteral Frequency:  cycle  Enteral Regimen: Isosource 1.5 at 100 mL/hr x 12 hours (7 am - 7 pm)   Total Enteral Provisions: 1800 kcal (23 kcal/kg), 82g protein (1.05 gl/kg), 211g CHO, 18g fiber and 912mL water.   Free Water Flush: 60 mL q 4 hour while on IVF              New Findings:  -3/8: Per Chart review: \"HISTORY: 58-year-old patient with prior GJ tube broken. That tube was  removed and a gastrostomy tube was placed in the emergency department.  Request made for transition of G to GJ tube.\" ; \"Gastrostomy tube converted to 18 Czech TORY gastrojejunostomy tube. The tube is ready for immediate use, as  Before.\"  -3/9: Per MD note: \"Pt's GJ tube was found to have a cut in the tubing; therefore, a temporary G tube was placed 3/5 and a GJ tube was placed by IR on 3/8\"  -3/11:  Per WOC RN Note: \"Coccyx with small area of superficial breakdown, likely Stage 2 pressure injury mixed with friction/moisture, present on admission.  Pt reportedly with loose stools for past week.\"        NUTRITION INTERVENTION:  None at this time     Meliza Washburn, YONIS, LD          "

## 2019-03-12 VITALS
WEIGHT: 171 LBS | RESPIRATION RATE: 18 BRPM | HEART RATE: 94 BPM | DIASTOLIC BLOOD PRESSURE: 69 MMHG | TEMPERATURE: 97.5 F | OXYGEN SATURATION: 94 % | BODY MASS INDEX: 23.19 KG/M2 | SYSTOLIC BLOOD PRESSURE: 113 MMHG

## 2019-03-12 PROCEDURE — 25000125 ZZHC RX 250: Performed by: NURSE PRACTITIONER

## 2019-03-12 PROCEDURE — A9270 NON-COVERED ITEM OR SERVICE: HCPCS | Mod: GY | Performed by: INTERNAL MEDICINE

## 2019-03-12 PROCEDURE — A9270 NON-COVERED ITEM OR SERVICE: HCPCS | Mod: GY | Performed by: NURSE PRACTITIONER

## 2019-03-12 PROCEDURE — 25000132 ZZH RX MED GY IP 250 OP 250 PS 637: Mod: GY | Performed by: NURSE PRACTITIONER

## 2019-03-12 PROCEDURE — 27210429 ZZH NUTRITION PRODUCT INTERMEDIATE LITER

## 2019-03-12 PROCEDURE — 99239 HOSP IP/OBS DSCHRG MGMT >30: CPT | Performed by: INTERNAL MEDICINE

## 2019-03-12 PROCEDURE — 25000132 ZZH RX MED GY IP 250 OP 250 PS 637: Mod: GY | Performed by: INTERNAL MEDICINE

## 2019-03-12 RX ORDER — ALBUTEROL SULFATE 5 MG/ML
2.5 SOLUTION RESPIRATORY (INHALATION)
Qty: 360 ML | Refills: 0 | Status: ON HOLD | OUTPATIENT
Start: 2019-03-12 | End: 2019-05-07

## 2019-03-12 RX ORDER — ALBUTEROL SULFATE 5 MG/ML
2.5 SOLUTION RESPIRATORY (INHALATION)
Status: DISCONTINUED | OUTPATIENT
Start: 2019-03-12 | End: 2019-03-12 | Stop reason: HOSPADM

## 2019-03-12 RX ORDER — ACETYLCYSTEINE 200 MG/ML
2 SOLUTION ORAL; RESPIRATORY (INHALATION) 4 TIMES DAILY
Qty: 300 VIAL | Refills: 0 | Status: SHIPPED | OUTPATIENT
Start: 2019-03-12 | End: 2019-09-21

## 2019-03-12 RX ORDER — ACETYLCYSTEINE 200 MG/ML
2 SOLUTION ORAL; RESPIRATORY (INHALATION) 4 TIMES DAILY
Status: DISCONTINUED | OUTPATIENT
Start: 2019-03-12 | End: 2019-03-12 | Stop reason: HOSPADM

## 2019-03-12 RX ORDER — GUAR GUM
1 PACKET (EA) ORAL DAILY
Qty: 30 PACKET | Refills: 0 | Status: SHIPPED | OUTPATIENT
Start: 2019-03-12 | End: 2021-02-22

## 2019-03-12 RX ADMIN — LEVOTHYROXINE SODIUM 137 MCG: 137 TABLET ORAL at 09:34

## 2019-03-12 RX ADMIN — Medication 1 PACKET: at 13:29

## 2019-03-12 RX ADMIN — HYDROCORTISONE 20 MG: 20 TABLET ORAL at 09:34

## 2019-03-12 RX ADMIN — VITAMIN D 2000 UNITS: 25 TAB ORAL at 09:35

## 2019-03-12 RX ADMIN — AMOXICILLIN AND CLAVULANATE POTASSIUM 1 TABLET: 875; 125 TABLET, FILM COATED ORAL at 09:34

## 2019-03-12 RX ADMIN — CARBAMAZEPINE 150 MG: 100 SUSPENSION ORAL at 02:37

## 2019-03-12 RX ADMIN — BRIVARACETAM 100 MG: 10 SOLUTION ORAL at 09:35

## 2019-03-12 RX ADMIN — SODIUM BICARBONATE 20 MG: 84 INJECTION, SOLUTION INTRAVENOUS at 09:35

## 2019-03-12 RX ADMIN — MULTIPLE VITAMINS W/ MINERALS TAB 1 TABLET: TAB at 09:34

## 2019-03-12 RX ADMIN — Medication 250 MG: at 09:35

## 2019-03-12 RX ADMIN — CARBAMAZEPINE 150 MG: 100 SUSPENSION ORAL at 09:35

## 2019-03-12 RX ADMIN — BACITRACIN: 500 OINTMENT TOPICAL at 09:36

## 2019-03-12 RX ADMIN — POTASSIUM & SODIUM PHOSPHATES POWDER PACK 280-160-250 MG 1 PACKET: 280-160-250 PACK at 09:36

## 2019-03-12 ASSESSMENT — ACTIVITIES OF DAILY LIVING (ADL)
ADLS_ACUITY_SCORE: 45

## 2019-03-12 NOTE — PROGRESS NOTES
PULMONARY CONSULT NOTE    Full consult dictated.    Active Problems:    Severe sepsis (H)           Assessment and Plan:      59 yo male former smoker with multiple medical problems including TBI with R sided hemiplegia, dysphagia s/p GJ tube, seizure disorder and recurrent aspiration pneumonia (7 admissions over the past 4 months) admitted with fever, N/V and leukocytosis of unclear source. CXR on admission showed poor inspiration but lungs grossly clear. Consulted re: management of secretions. Patient has Albuterol nebs at home, uses prn. Options for pulmonary toilet are somewhat limited in this setting, but patient may benefit from scheduled Albuterol nebs with MucoMyst combined with suctioning at times of increased chest congestion. If this is not successful, then tracheostomy could be considered, as this may prevent frequent hospital readmission. Patient clinically improved at present, with resolution of leukocytosis. Respiratory status currently stable on room air.    Diagnoses  Ap depend history Z87.891  Pneum aspiration J69.0  SOB   R06.02    PLAN:  1. Bronchodilators - consider increased use of Albuterol nebs, add MucoMyst as outlined above.  2. Antibiotics - Augmentin.  3. If continues to have recurrent episodes of aspiration, consider ENT consult to discuss option of tracheostomy.    Thanks, will follow.      Arnaud De La Rosa MD    [facility]  [phone] (pager)  [phone] (office)

## 2019-03-12 NOTE — DISCHARGE SUMMARY
[facility]  Discharge Summary        Keyon Farias MRN# [id number]   YOB: 1960 Age: 58 year old     Date of Admission:  3/9/2019  Date of Discharge:  3/12/2019  Admitting Physician:  Edgar Jenkins MD  Discharge Physician: Starr Champion MD  Discharging Service: Hospitalist     Primary Provider: Carlos Gomez  Primary Care Physician Phone Number: [phone]         Discharge Diagnoses/Problem Oriented Hospital Course (Providers):    Keyon Farias was admitted on 3/9/2019 by Edgar Jenkins MD and I would refer you to their history and physical.  The following problems were addressed during his hospitalization:  Keyon Farias is a 58 year old male who was admitted on 3/9/2019.  Assessment & Plan     Keyon Farias is a 58 year old male with PMH significant for TBI with R side hemiplegia, dysphagia 2/2 TBI s/p GJ tube, seizure disorder, panhypopituitarism, recurrent aspiration PNA, UTI with pseudomonas and E.coli, chronic normocytic anemia, GERD, hypothyroidism, history of MRSA/VRE admitted on 3/9/2019. He presents with fever, nausea, vomiting, 1 week history loose stools.     Severe sepsis, with h/o recurrent ASPIRATION PNEUMONIA  most likely from aspiration event PTA  Nausea, vomiting, loose stools.  History of recurrent aspiration pneumonia.  History of pseudomonas, E.coli UTI.  - Pt has had multiple readmissions (11/2/18, 11/22/18, 12/18/18, 1/14/19, 1/31/19, 2/20/19, 2/27/19) for recurrent aspiration PNA; most recently discharged 3/4/19.  Pt with notable pseudomonas and E.coli UTI  1/14/19.  CXR on admission today grossly clear, no obvious pleural effusions.  Negative influenza A/B.  UA remarkably WNL.  WBC 21.5, lactic acid 3.1 on admission.  Blood cultures x2 and urine culture NGTD.  Pt received 2L IVF bolus, started on vancomycin and zosyn.  Pt's mother/guardian notes 1 week history loose stools while receiving antibiotics.  Pt's GJ tube was found to have a cut in  the tubing; therefore, a temporary G tube was placed 3/5 and a GJ tube was placed by IR on 3/8  - WBC 21.5, improved to 14k to 7.5k today   LACTIC ACIDOSIS; DU ETO SEVERE SEPSIS IMPROVED WITH ivf   -c.diff returned negative and blood cultures and urine culture negative   - MIVF  ml/hr  - Pharmacy to dose vancomycin; zosyn.stopped  vancomycin on 3/10/19. Continue with zosyn   switched zosyn to oral augmentin   He is stable. No SOB or fevers  pUlmonary consulted RE recurrent aspiration pneumonias and they recommended albuterol and mucomyst nebs to help with secretions and if it happens despite this we need to talk to ENT re possible tracheostomy in the future      H/o TBI with right sided hemiplegia   Dysphagia 2/2 TBI s/p GJ tube  Panhypopituitarism   Seizure disorder 2/2 TBI  Hypothyroidism  Stable at this time.  No signs of adrenal insufficiency at this time.  - PTA Brivaracetam, Tegretol  - PTA Synthroid 137 mcg  - PTA Hydrocortisone  - Speech consulted; appreciate recommendations.  Pt to remain NPO until seen by speech.  Please perform oral cares frequently.  - Previously ordered TF regimen; to be confirmed with mother/guardian prior to initiation.  Isosource 1.5 @ 100 mL/hr x 12 hrs (7am-7pm),    Nutrition services consulted Re starting tube feeds and they were restarted      Chronic anemia.  - Baseline hgb 10-12, on admission 13.9.     GERD.  - PTA pantoprazole     Recent penile pressure injury.  - Pericare     History of MRSA, VRE.     Pain Assessment:  Current Pain Score 3/9/2019   Patient currently in pain? HECTOR   Pain score (0-10) -   Pain location -   Pain descriptors -   rFLACC pain score -   Keyon liriano pain level was assessed and he currently denies pain.          Diet: TF, pending confirmation  DVT Prophylaxis: Pneumatic Compression Devices  Lerma Catheter: not present  Code Status: Full code; confirmed with pt's mother/guardian over the phone        Disposition Plan;  Home today with home care          Starr Champion MD  [phone] (P)                Code Status:      Full Code        Brief Hospital Stay Summary Sent Home With Patient in AVS:        Reason for your hospital stay      Aspiration pneumonia                 Important Results:      See below         Pending Results:        Unresulted Labs Ordered in the Past 30 Days of this Admission     Date and Time Order Name Status Description    3/9/2019 2005 Blood culture Preliminary     3/9/2019 2005 Blood culture Preliminary             Discharge Instructions and Follow-Up:      Follow-up Appointments     Follow-up and recommended labs and tests       Follow up with primary care provider, Carlos Gomez, within 7 days for   hospital follow- up.  No follow up labs or test are needed.    [facility]   [address] [place] [address], [address]   [place] [address]   [phone]               Discharge Disposition:      Discharged to home        Discharge Medications:        Current Discharge Medication List      START taking these medications    Details   acetylcysteine (MUCOMYST) 20 % neb solution Take 2 mLs by nebulization 4 times daily  Qty: 300 vial, Refills: 0    Associated Diagnoses: Pneumonia of both lower lobes due to infectious organism (H)      albuterol (PROVENTIL) (5 MG/ML) 0.5% neb solution Take 0.5 mLs (2.5 mg) by nebulization every 4 hours (while awake)  Qty: 360 mL, Refills: 0    Associated Diagnoses: Pneumonia of both lower lobes due to infectious organism (H)      amoxicillin-clavulanate (AUGMENTIN) 875-125 MG tablet Take 1 tablet by mouth every 12 hours for 7 days  Qty: 14 tablet, Refills: 0    Associated Diagnoses: Pneumonia of both lower lobes due to infectious organism (H)      Guar Gum (FIBER MODULAR, NUTRISOURCE FIBER,) packet 1 packet by Per G Tube route daily  Qty: 30 packet, Refills: 0    Associated Diagnoses: Pneumonia of both lower lobes due to infectious organism (H)      order  for DME Equipment being ordered: Nebulizer  Qty: 1 Units, Refills: 0    Associated Diagnoses: Pneumonia of both lower lobes due to infectious organism (H)         CONTINUE these medications which have NOT CHANGED    Details   acetaminophen (TYLENOL) 500 MG tablet 1,000 mg by Oral or FT or NG tube route every 6 hours as needed for mild pain      bacitracin ointment Apply topically daily To PEG site.       Brivaracetam (BRIVIACT) 10 MG/ML solution 100 mg by Oral or FT or NG tube route 2 times daily @0900 and 2100      calcium carbonate 1250 (500 CA) MG/5ML SUSP suspension 5 mLs (1,250 mg) by Per J Tube route 3 times daily (with meals)  Qty: 450 mL    Associated Diagnoses: Malnutrition (H)      carBAMazepine (TEGRETOL) 100 MG/5ML suspension Take 150 mg by mouth every 6 hours At 06:00, 12:00, 18:00 and 24:00 for seizures      !! hydrocortisone (CORTEF) 5 MG tablet 10 mg by Oral or FT or NG tube route daily (with dinner) At 1500      !! hydrocortisone (CORTEF) 5 MG tablet 20 mg by Oral or FT or NG tube route every morning       levothyroxine (SYNTHROID/LEVOTHROID) 137 MCG tablet 137 mcg by Oral or FT or NG tube route daily      melatonin (MELATONIN) 1 MG/ML LIQD liquid 6 mg by Per NG tube route At Bedtime       Multiple Vitamins-Minerals (CENTRUM SILVER) per tablet Take 1 tablet by mouth daily Crush and feed via j-tube      pantoprazole (PROTONIX) 2 mg/mL SUSP suspension 20 mg by Per NG tube route 2 times daily       potassium & sodium phosphates (NEUTRA-PHOS) 280-160-250 MG Packet Take 1 packet by mouth 3 times daily 0900, 1500, 2100.       saccharomyces boulardii (FLORASTOR) 250 MG capsule 250 mg by Oral or Feeding Tube route daily      testosterone cypionate (DEPOTESTOTERONE CYPIONATE) 200 MG/ML injection Inject 76 mg into the muscle See Admin Instructions Every 2 weeks on Fridays   76 mg or 0.38 mL      Vitamin D, Cholecalciferol, 1000 units TABS Take 2,000 Units by mouth every morning Crush and feed via j-tube        !! - Potential duplicate medications found. Please discuss with provider.      STOP taking these medications       albuterol (2.5 MG/3ML) 0.083% neb solution Comments:   Reason for Stopping:         glycopyrrolate (GLYCATE) 2 MG tablet Comments:   Reason for Stopping:         Wheat Dextrin (BENEFIBER) POWD Comments:   Reason for Stopping:                 Allergies:         Allergies   Allergen Reactions     Dilantin [Phenytoin Sodium]      Valproic Acid      Toxicity c bone marrow suspension, elevated ammonia levels            Consultations This Hospital Stay:      Consultation during this admission received from pulmonary and nutrition services         Condition and Physical on Discharge:      Discharge condition: Stable   Vitals: Blood pressure 113/69, pulse 94, temperature 97.5  F (36.4  C), temperature source Oral, resp. rate 18, weight 77.6 kg (171 lb), SpO2 94 %.     Constitutional: Alert and awake    Lungs: CTA   Cardiovascular: RRR   Abdomen: Soft, NT, ND, BS+   Skin: Warm and dry    Other:          Discharge Time:      Greater than 30 minutes.        Image Results From This Hospital Stay (For Non-EPIC Providers):        Results for orders placed or performed during the hospital encounter of 03/09/19   XR Chest 2 Views    Narrative    CHEST TWO VIEW   3/9/2019 8:40 PM     HISTORY: Sepsis, history of aspiration pneumonia.    COMPARISON: Chest x-ray 2/27/2019.      Impression    IMPRESSION: Two views of the chest are performed. Lungs are  hypoaerated but grossly clear. Heart is normal in size. No  pneumothorax or obvious pleural effusions. Lateral view is limited due  to hypoaeration of the lungs. No obvious interval change since prior  exam.    GEE BOWMAN MD   XR Abdomen Port 1 View    Narrative    ABDOMEN SINGLE LEFT LATERAL DECUBITUS VIEW PORTABLE   3/10/2019 12:20  AM     HISTORY: Recent gastrojejunostomy tube exchange, now with severe  sepsis. Evaluate for free air.    COMPARISON: None.      Impression     IMPRESSION:   1. No convincing free intraperitoneal gas.  2. A percutaneous balloon-tipped gastrojejunostomy tube projects over  the upper left hemiabdomen.    ELODIA ANN MD             Most Recent Lab Results In EPIC (For Non-EPIC Providers):    Most Recent 3 CBC's:  Recent Labs   Lab Test 03/11/19  0724 03/10/19  0734 03/09/19 2000   WBC 7.5 14.3* 21.5*   HGB 10.5* 10.5* 13.9   MCV 82 82 82   * 127* 166      Most Recent 3 BMP's:  Recent Labs   Lab Test 03/11/19  0724 03/10/19  0734 03/09/19 2000    137 135   POTASSIUM 3.9 3.6 3.9   CHLORIDE 107 106 97   CO2 28 27 32   BUN 10 12 19   CR 0.72 0.76 0.82   ANIONGAP 4 4 6   STEVE 8.0* 7.9* 8.9   GLC 90 109* 129*     Most Recent 3 Troponin's:  Recent Labs   Lab Test 01/22/17  0500 01/21/17  2348 01/21/17  1600   TROPI 0.017 0.025 0.028     Most Recent 3 INR's:  Recent Labs   Lab Test 02/07/17  0452 01/30/17  0340 01/25/17  0426   INR 1.27* 1.32* 1.49*     Most Recent 2 LFT's:  Recent Labs   Lab Test 03/09/19 2000 02/27/19  2048   AST 23 17   ALT 38 28   ALKPHOS 103 99   BILITOTAL 0.2 0.2     Most Recent Cholesterol Panel:  Recent Labs   Lab Test 11/29/16  0430   TRIG 100     Most Recent 6 Bacteria Isolates From Any Culture (See EPIC Reports for Culture Details):  Recent Labs   Lab Test 03/09/19  2110 03/09/19  2100 03/09/19 2000 02/27/19  2130 02/27/19  2048 02/21/19  1841   CULT No growth No growth after 3 days No growth after 3 days No growth No growth No growth     Most Recent TSH, T4 and HgbA1c:   Recent Labs   Lab Test 11/22/18  1438 07/05/18  0021   TSH  --  <0.01*   T4  --  1.66*   A1C 6.3*  --

## 2019-03-12 NOTE — CONSULTS
Consult Date:  03/12/2019      PULMONARY CONSULTATION        REASON FOR CONSULTATION:  Aspiration pneumonia.      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  Chest x-ray on admission showed poor inspiration, but the lungs were grossly clear.  The patient has been treated with antibiotic therapy and has clinically improved with resolution of leukocytosis and is currently stable on room air.  We are consulted regarding management of his secretions.  The patient does have an albuterol nebulizer at home, which he uses on a p.r.n. basis.      CURRENT MEDICATIONS:  Include Augmentin, Broviac, Tegretol, Cortef, Synthroid, multivitamins, Protonix, Florastor and vitamin D.      PAST MEDICAL HISTORY:   1.  Traumatic brain injury.   2.  History of DVT of upper extremity.   3.  GERD.   4.  Pan hypopituitarism.   5.  Recurrent aspiration pneumonia.   6.  Seizure disorder.   7.  Spastic hemiplegia.   8.  Thyroid disease.      ALLERGIES:  DILANTIN AND VALPROIC ACID.      SOCIAL HISTORY:  The patient is a former cigarette smoker, but quit in 1989.      FAMILY HISTORY:  Noncontributory.      REVIEW OF SYSTEMS:  Noncontributory.      PHYSICAL EXAMINATION:   GENERAL:  Reveals a middle-aged male, awake and alert, smiling, able to verbalize yes and no somewhat consistently and in no respiratory distress.   VITAL SIGNS:  He is afebrile, respiratory rate is 18, pulse is 85, blood pressure is 113/69 and oxygen saturation is 94% to 95% on room air.   HEENT:  Grossly unremarkable.   LUNGS:  Fairly clear bilaterally.   CARDIAC:  Regular rate  and rhythm.   ABDOMEN:  Notable for the presence of the GJ tube, was soft and nontender without rebound, guarding.   EXTREMITIES:  Notable for his spastic hemiplegia, but no cyanosis or clubbing.      LABORATORY DATA:  Electrolytes and renal function are normal.  White count 7.5, hemoglobin 10.5, hematocrit 32.2, platelet count 117,000.        A chest x-ray showed clear lung fields bilaterally.      ASSESSMENT:  A 58-year-old male, former smoker, with multiple medical problems, including traumatic brain injury with right-sided hemiplegia, dysphagia status post GJ tube and seizure disorder and recurrent episodes of aspiration pneumonia, is now admitted with sepsis.  The patient is clinically improved at present.  We are consulted regarding management of his secretions at home.  The patient does have a nebulizer machine at home with albuterol prescribed, which is used p.r.n.  Options for pulmonary toilet are somewhat limited in this setting, but patient may benefit from scheduled albuterol nebs with Mucomyst combined with suctioning at times of increased chest congestion.  If this is not successful, then tracheostomy could be considered, as this may prevent frequent hospital readmission.  He is clinically improved at present with resolution of leukocytosis and no longer requiring supplemental oxygen.      PLAN:   1.  Bronchodilators, consider increased use of albuterol nebs and add Mucomyst as outlined above.   2.  Antibiotics, Augmentin.   3.  If patient continues to have recurrent episodes of aspiration pneumonia, consider ENT consult to discuss the option of tracheostomy.      I appreciate the opportunity to participate in this patient's care.         BLANCA ANDERSON MD             D: 2019   T: 2019   MT: AURE      Name:     BERT BARAJAS   MRN:      [id number]        Account:       [id number]   :      1960           Consult Date:  2019      Document: [id number]       cc: Carlos Gomez MD        Arnaud Champion MD

## 2019-03-12 NOTE — PLAN OF CARE
Nursing note  Pt is non verbal but able to nod his head on some yes or no question. Repositioned q 1-2 hrs. Pt is incontinent to bladder and bowel. Changed 3 times this morning. No BM. Redness on coccyx, mepilex and protective cream applied. Protective powder applied on the memo area. No evidence of discomfort noted. TF( Isosource 1.5 ) started at 09:30 this morning. Dressing also changed on the PEG site. Pt was discharge back home this afternoon. All the necessary information was given to the transportation personal. Discharge medication was delayed due to insurance issues per pharmacy, but it will be sending to pt when they resolve the issue. Charge and  aware.

## 2019-03-12 NOTE — PROGRESS NOTES
CHE  D/I: Pt mother called and was given SW contact to arrange ride for pt.  Mother states pt wheelchair bound.  CHE spoke with  who will be arranging meds for pt.  CHE called [facility] and spoke to Stefan, who states ride will arrive at [facility] at 145. Updated pt mother/guardian.

## 2019-03-12 NOTE — PLAN OF CARE
Pt opens eyes spontaneously, inconsistent with commands. Nonverbal, HECTOR orientation. VSS. No nonverbal signs of pain. Inc of urine. Turned and repositioned. Orders for intermittent TF, stopped @ 0200. TF to be restarted @ 1400. Redness/rash along groin folds and bottom, miconazole powder applied with memo cares.

## 2019-03-12 NOTE — PROGRESS NOTES
[facility]  Patient is currently open to home care services with [facility]. The patient is currently receiving Skilled Nursing services. [facility]  and team have been notified of patient admission. [facility] liaison will continue to follow patient during stay. If appropriate provide orders to resume home care at time of discharge.

## 2019-03-12 NOTE — PROGRESS NOTES
Spoke with mother, Savannah, regarding nebulizer machine.  There was a nebulizer machine dispensed in January 2018, insurance will only cover a machine every 5 years.  Savannah is going to look at home for the machine.  [facility] information placed in the discharge paperwork if she needs to order one, RX placed in charge and to go home with patient.     Savannah stated she will set up the follow up appointment with the patient's primary care giver.

## 2019-03-13 ENCOUNTER — NURSE TRIAGE (OUTPATIENT)
Dept: NURSING | Facility: CLINIC | Age: 59
End: 2019-03-13

## 2019-03-13 NOTE — ADDENDUM NOTE
Encounter addended by: Daisy Buhs on: 3/13/2019 12:38 PM   Actions taken: Charge Capture section accepted

## 2019-03-13 NOTE — TELEPHONE ENCOUNTER
Zeke, nurse, calls to say patient has had 2 Neb treatment and mucomist but seems to be having trouble with breathing.  O2 saturation is lower than normal.  Patient is usally at 94-95% or better but today O2 sometimes has dropped to 88-89% and then will go back up to 90-91%.  Zeke says after the neb treatment his O2 sat did go back up to 96% but then dropped again.  Current respiration is 18 and pulse has been between 115-127.  Patient has audible wheezes.  Reviewed guideline and care advice with caller for patient to be evaluated in the ED.  Caller verbalizes understanding.        Reason for Disposition    [1] MODERATE asthma attack (e.g., SOB at rest, speaks in phrases, audible wheezes) AND [2] not resolved after 2 nebulizer or inhaler treatments given 20 minutes apart    Additional Information    Negative: [1] Breathing stopped AND [2] hasn't returned    Negative: Choking on something    Negative: Severe difficulty breathing (e.g., struggling for each breath, speaks in single words)    Negative: Bluish lips, tongue, or face now    Negative: Difficult to awaken or acting confused  (e.g., disoriented, slurred speech)    Negative: Passed out (i.e., lost consciousness, collapsed and was not responding)    Negative: Wheezing started suddenly after medicine, an allergic food or bee sting    Negative: Stridor    Negative: Slow, shallow and weak breathing    Negative: Sounds like a life-threatening emergency to the triager    Negative: Chest pain    [1] Wheezing (high pitched whistling sound) AND [2] previous asthma attacks or use of asthma medicines    Negative: Severe difficulty breathing (e.g., struggling for each breath, speak in single words, pulse > 120)    Negative: Bluish lips, tongue, or face now    Negative: Difficult to awaken or acting confused  (e.g., disoriented, slurred speech)    Negative: Passed out (i.e., lost consciousness, collapsed and was not responding)    Negative: Wheezing started suddenly after  medicine, an allergic food, or bee sting    Negative: Sounds like a life-threatening emergency to the triager    Negative: [1] SEVERE asthma attack (e.g., very SOB at rest, speaks in single words, loud wheezes) AND [2] not resolved after 2 nebulizer or inhaler treatments    Negative: Peak flow rate less than 50% of baseline level (RED zone)    Negative: [1] Severe wheezing or coughing AND [2] doesn't have neb or inhaler available    Negative: Chest pain    Negative: [1] Hospitalized before with asthma AND [2] feels the same now    Protocols used: ASTHMA ATTACK-ADULT-AH, BREATHING DIFFICULTY-ADULT-AH

## 2019-03-14 ENCOUNTER — HOSPITAL ENCOUNTER (INPATIENT)
Facility: CLINIC | Age: 59
LOS: 4 days | Discharge: HOME | DRG: 871 | End: 2019-03-18
Attending: EMERGENCY MEDICINE | Admitting: INTERNAL MEDICINE
Payer: MEDICARE

## 2019-03-14 ENCOUNTER — APPOINTMENT (OUTPATIENT)
Dept: GENERAL RADIOLOGY | Facility: CLINIC | Age: 59
DRG: 871 | End: 2019-03-14
Attending: EMERGENCY MEDICINE
Payer: MEDICARE

## 2019-03-14 DIAGNOSIS — J18.9 PNEUMONIA OF RIGHT LOWER LOBE DUE TO INFECTIOUS ORGANISM: ICD-10-CM

## 2019-03-14 DIAGNOSIS — J18.9 PNEUMONIA OF BOTH LOWER LOBES DUE TO INFECTIOUS ORGANISM: ICD-10-CM

## 2019-03-14 LAB
ALBUMIN SERPL-MCNC: 3 G/DL (ref 3.4–5)
ALP SERPL-CCNC: 84 U/L (ref 40–150)
ALT SERPL W P-5'-P-CCNC: 38 U/L (ref 0–70)
ANION GAP SERPL CALCULATED.3IONS-SCNC: 8 MMOL/L (ref 3–14)
AST SERPL W P-5'-P-CCNC: 24 U/L (ref 0–45)
BASOPHILS # BLD AUTO: 0.1 10E9/L (ref 0–0.2)
BASOPHILS NFR BLD AUTO: 0.4 %
BILIRUB SERPL-MCNC: 0.4 MG/DL (ref 0.2–1.3)
BUN SERPL-MCNC: 15 MG/DL (ref 7–30)
CALCIUM SERPL-MCNC: 8.7 MG/DL (ref 8.5–10.1)
CHLORIDE SERPL-SCNC: 103 MMOL/L (ref 94–109)
CO2 SERPL-SCNC: 27 MMOL/L (ref 20–32)
CREAT SERPL-MCNC: 0.85 MG/DL (ref 0.66–1.25)
DIFFERENTIAL METHOD BLD: ABNORMAL
EOSINOPHIL # BLD AUTO: 0.5 10E9/L (ref 0–0.7)
EOSINOPHIL NFR BLD AUTO: 1.9 %
ERYTHROCYTE [DISTWIDTH] IN BLOOD BY AUTOMATED COUNT: 15 % (ref 10–15)
GFR SERPL CREATININE-BSD FRML MDRD: >90 ML/MIN/{1.73_M2}
GLUCOSE SERPL-MCNC: 126 MG/DL (ref 70–99)
HCT VFR BLD AUTO: 37.3 % (ref 40–53)
HGB BLD-MCNC: 12.7 G/DL (ref 13.3–17.7)
IMM GRANULOCYTES # BLD: 0.1 10E9/L (ref 0–0.4)
IMM GRANULOCYTES NFR BLD: 0.3 %
LACTATE BLD-SCNC: 1.5 MMOL/L (ref 0.7–2)
LYMPHOCYTES # BLD AUTO: 4 10E9/L (ref 0.8–5.3)
LYMPHOCYTES NFR BLD AUTO: 16.7 %
MCH RBC QN AUTO: 27.7 PG (ref 26.5–33)
MCHC RBC AUTO-ENTMCNC: 34 G/DL (ref 31.5–36.5)
MCV RBC AUTO: 81 FL (ref 78–100)
MONOCYTES # BLD AUTO: 1.3 10E9/L (ref 0–1.3)
MONOCYTES NFR BLD AUTO: 5.3 %
NEUTROPHILS # BLD AUTO: 18.2 10E9/L (ref 1.6–8.3)
NEUTROPHILS NFR BLD AUTO: 75.4 %
NRBC # BLD AUTO: 0 10*3/UL
NRBC BLD AUTO-RTO: 0 /100
PLATELET # BLD AUTO: 182 10E9/L (ref 150–450)
POTASSIUM SERPL-SCNC: 3.7 MMOL/L (ref 3.4–5.3)
PROT SERPL-MCNC: 8 G/DL (ref 6.8–8.8)
RBC # BLD AUTO: 4.58 10E12/L (ref 4.4–5.9)
SODIUM SERPL-SCNC: 138 MMOL/L (ref 133–144)
WBC # BLD AUTO: 24.1 10E9/L (ref 4–11)

## 2019-03-14 PROCEDURE — 99223 1ST HOSP IP/OBS HIGH 75: CPT | Mod: AI | Performed by: INTERNAL MEDICINE

## 2019-03-14 PROCEDURE — A9270 NON-COVERED ITEM OR SERVICE: HCPCS | Mod: GY | Performed by: INTERNAL MEDICINE

## 2019-03-14 PROCEDURE — 25800030 ZZH RX IP 258 OP 636: Performed by: INTERNAL MEDICINE

## 2019-03-14 PROCEDURE — 25000132 ZZH RX MED GY IP 250 OP 250 PS 637: Mod: GY | Performed by: INTERNAL MEDICINE

## 2019-03-14 PROCEDURE — 96365 THER/PROPH/DIAG IV INF INIT: CPT

## 2019-03-14 PROCEDURE — 25000128 H RX IP 250 OP 636: Performed by: INTERNAL MEDICINE

## 2019-03-14 PROCEDURE — 36415 COLL VENOUS BLD VENIPUNCTURE: CPT

## 2019-03-14 PROCEDURE — 94640 AIRWAY INHALATION TREATMENT: CPT | Mod: 76

## 2019-03-14 PROCEDURE — 80053 COMPREHEN METABOLIC PANEL: CPT | Performed by: EMERGENCY MEDICINE

## 2019-03-14 PROCEDURE — 83605 ASSAY OF LACTIC ACID: CPT | Performed by: EMERGENCY MEDICINE

## 2019-03-14 PROCEDURE — 87040 BLOOD CULTURE FOR BACTERIA: CPT | Performed by: EMERGENCY MEDICINE

## 2019-03-14 PROCEDURE — 12000000 ZZH R&B MED SURG/OB

## 2019-03-14 PROCEDURE — 25000128 H RX IP 250 OP 636: Performed by: EMERGENCY MEDICINE

## 2019-03-14 PROCEDURE — 71046 X-RAY EXAM CHEST 2 VIEWS: CPT

## 2019-03-14 PROCEDURE — 99207 ZZC NON-BILLABLE SERV PER CHARTING: CPT | Performed by: INTERNAL MEDICINE

## 2019-03-14 PROCEDURE — 85025 COMPLETE CBC W/AUTO DIFF WBC: CPT | Performed by: EMERGENCY MEDICINE

## 2019-03-14 PROCEDURE — 40000275 ZZH STATISTIC RCP TIME EA 10 MIN

## 2019-03-14 PROCEDURE — 99285 EMERGENCY DEPT VISIT HI MDM: CPT | Mod: 25

## 2019-03-14 PROCEDURE — 94640 AIRWAY INHALATION TREATMENT: CPT

## 2019-03-14 PROCEDURE — 25000125 ZZHC RX 250: Performed by: INTERNAL MEDICINE

## 2019-03-14 RX ORDER — ACETYLCYSTEINE 200 MG/ML
2 SOLUTION ORAL; RESPIRATORY (INHALATION) 4 TIMES DAILY
Status: DISCONTINUED | OUTPATIENT
Start: 2019-03-14 | End: 2019-03-18 | Stop reason: HOSPADM

## 2019-03-14 RX ORDER — HYDROCORTISONE 20 MG/1
20 TABLET ORAL EVERY MORNING
Status: DISCONTINUED | OUTPATIENT
Start: 2019-03-14 | End: 2019-03-18 | Stop reason: HOSPADM

## 2019-03-14 RX ORDER — ONDANSETRON 2 MG/ML
4 INJECTION INTRAMUSCULAR; INTRAVENOUS EVERY 6 HOURS PRN
Status: DISCONTINUED | OUTPATIENT
Start: 2019-03-14 | End: 2019-03-18 | Stop reason: HOSPADM

## 2019-03-14 RX ORDER — HYDROCORTISONE 10 MG/1
10 TABLET ORAL
Status: DISCONTINUED | OUTPATIENT
Start: 2019-03-14 | End: 2019-03-18 | Stop reason: HOSPADM

## 2019-03-14 RX ORDER — GUAR GUM
1 PACKET (EA) ORAL DAILY
Status: DISCONTINUED | OUTPATIENT
Start: 2019-03-14 | End: 2019-03-18 | Stop reason: HOSPADM

## 2019-03-14 RX ORDER — CARBAMAZEPINE 100 MG/5ML
150 SUSPENSION ORAL EVERY 6 HOURS
Status: DISCONTINUED | OUTPATIENT
Start: 2019-03-14 | End: 2019-03-18 | Stop reason: HOSPADM

## 2019-03-14 RX ORDER — NALOXONE HYDROCHLORIDE 0.4 MG/ML
.1-.4 INJECTION, SOLUTION INTRAMUSCULAR; INTRAVENOUS; SUBCUTANEOUS
Status: DISCONTINUED | OUTPATIENT
Start: 2019-03-14 | End: 2019-03-18 | Stop reason: HOSPADM

## 2019-03-14 RX ORDER — SODIUM CHLORIDE 9 MG/ML
INJECTION, SOLUTION INTRAVENOUS CONTINUOUS
Status: DISCONTINUED | OUTPATIENT
Start: 2019-03-14 | End: 2019-03-18 | Stop reason: HOSPADM

## 2019-03-14 RX ORDER — PIPERACILLIN SODIUM, TAZOBACTAM SODIUM 3; .375 G/15ML; G/15ML
3.38 INJECTION, POWDER, LYOPHILIZED, FOR SOLUTION INTRAVENOUS ONCE
Status: COMPLETED | OUTPATIENT
Start: 2019-03-14 | End: 2019-03-14

## 2019-03-14 RX ORDER — PIPERACILLIN SODIUM, TAZOBACTAM SODIUM 4; .5 G/20ML; G/20ML
4.5 INJECTION, POWDER, LYOPHILIZED, FOR SOLUTION INTRAVENOUS EVERY 6 HOURS
Status: DISCONTINUED | OUTPATIENT
Start: 2019-03-14 | End: 2019-03-18 | Stop reason: HOSPADM

## 2019-03-14 RX ORDER — POTASSIUM CHLORIDE 7.45 MG/ML
10 INJECTION INTRAVENOUS
Status: DISCONTINUED | OUTPATIENT
Start: 2019-03-14 | End: 2019-03-18 | Stop reason: HOSPADM

## 2019-03-14 RX ORDER — ALBUTEROL SULFATE 0.83 MG/ML
2.5 SOLUTION RESPIRATORY (INHALATION)
Status: DISCONTINUED | OUTPATIENT
Start: 2019-03-14 | End: 2019-03-18 | Stop reason: HOSPADM

## 2019-03-14 RX ORDER — POTASSIUM CHLORIDE 1500 MG/1
20-40 TABLET, EXTENDED RELEASE ORAL
Status: DISCONTINUED | OUTPATIENT
Start: 2019-03-14 | End: 2019-03-18 | Stop reason: HOSPADM

## 2019-03-14 RX ORDER — ONDANSETRON 4 MG/1
4 TABLET, ORALLY DISINTEGRATING ORAL EVERY 6 HOURS PRN
Status: DISCONTINUED | OUTPATIENT
Start: 2019-03-14 | End: 2019-03-18 | Stop reason: HOSPADM

## 2019-03-14 RX ORDER — ACETAMINOPHEN 650 MG/1
650 SUPPOSITORY RECTAL EVERY 4 HOURS PRN
Status: DISCONTINUED | OUTPATIENT
Start: 2019-03-14 | End: 2019-03-18 | Stop reason: HOSPADM

## 2019-03-14 RX ORDER — ACETAMINOPHEN 325 MG/1
650 TABLET ORAL EVERY 4 HOURS PRN
Status: DISCONTINUED | OUTPATIENT
Start: 2019-03-14 | End: 2019-03-16

## 2019-03-14 RX ORDER — POTASSIUM CHLORIDE 1.5 G/1.58G
20-40 POWDER, FOR SOLUTION ORAL
Status: DISCONTINUED | OUTPATIENT
Start: 2019-03-14 | End: 2019-03-18 | Stop reason: HOSPADM

## 2019-03-14 RX ORDER — POTASSIUM CL/LIDO/0.9 % NACL 10MEQ/0.1L
10 INTRAVENOUS SOLUTION, PIGGYBACK (ML) INTRAVENOUS
Status: DISCONTINUED | OUTPATIENT
Start: 2019-03-14 | End: 2019-03-18 | Stop reason: HOSPADM

## 2019-03-14 RX ORDER — SODIUM CHLORIDE 9 MG/ML
1000 INJECTION, SOLUTION INTRAVENOUS CONTINUOUS
Status: DISCONTINUED | OUTPATIENT
Start: 2019-03-14 | End: 2019-03-14

## 2019-03-14 RX ORDER — PROCHLORPERAZINE MALEATE 5 MG
10 TABLET ORAL EVERY 6 HOURS PRN
Status: DISCONTINUED | OUTPATIENT
Start: 2019-03-14 | End: 2019-03-18 | Stop reason: HOSPADM

## 2019-03-14 RX ORDER — POTASSIUM CHLORIDE 29.8 MG/ML
20 INJECTION INTRAVENOUS
Status: DISCONTINUED | OUTPATIENT
Start: 2019-03-14 | End: 2019-03-18 | Stop reason: HOSPADM

## 2019-03-14 RX ORDER — BACITRACIN ZINC 500 [USP'U]/G
OINTMENT TOPICAL DAILY
Status: DISCONTINUED | OUTPATIENT
Start: 2019-03-14 | End: 2019-03-18 | Stop reason: HOSPADM

## 2019-03-14 RX ORDER — LANOLIN ALCOHOL/MO/W.PET/CERES
6 CREAM (GRAM) TOPICAL AT BEDTIME
Status: DISCONTINUED | OUTPATIENT
Start: 2019-03-14 | End: 2019-03-18 | Stop reason: HOSPADM

## 2019-03-14 RX ORDER — SACCHAROMYCES BOULARDII 250 MG
250 CAPSULE ORAL DAILY
Status: DISCONTINUED | OUTPATIENT
Start: 2019-03-14 | End: 2019-03-18 | Stop reason: HOSPADM

## 2019-03-14 RX ORDER — PROCHLORPERAZINE 25 MG
25 SUPPOSITORY, RECTAL RECTAL EVERY 12 HOURS PRN
Status: DISCONTINUED | OUTPATIENT
Start: 2019-03-14 | End: 2019-03-18 | Stop reason: HOSPADM

## 2019-03-14 RX ORDER — MAGNESIUM SULFATE HEPTAHYDRATE 40 MG/ML
4 INJECTION, SOLUTION INTRAVENOUS EVERY 4 HOURS PRN
Status: DISCONTINUED | OUTPATIENT
Start: 2019-03-14 | End: 2019-03-18 | Stop reason: HOSPADM

## 2019-03-14 RX ADMIN — HYDROCORTISONE 20 MG: 20 TABLET ORAL at 10:39

## 2019-03-14 RX ADMIN — ALBUTEROL SULFATE 2.5 MG: 2.5 SOLUTION RESPIRATORY (INHALATION) at 10:30

## 2019-03-14 RX ADMIN — PIPERACILLIN AND TAZOBACTAM 4.5 G: 4; .5 INJECTION, POWDER, FOR SOLUTION INTRAVENOUS at 21:29

## 2019-03-14 RX ADMIN — POTASSIUM & SODIUM PHOSPHATES POWDER PACK 280-160-250 MG 1 PACKET: 280-160-250 PACK at 10:39

## 2019-03-14 RX ADMIN — PIPERACILLIN AND TAZOBACTAM 4.5 G: 4; .5 INJECTION, POWDER, FOR SOLUTION INTRAVENOUS at 16:19

## 2019-03-14 RX ADMIN — ALBUTEROL SULFATE 2.5 MG: 2.5 SOLUTION RESPIRATORY (INHALATION) at 18:56

## 2019-03-14 RX ADMIN — HYDROCORTISONE 10 MG: 10 TABLET ORAL at 16:19

## 2019-03-14 RX ADMIN — LEVOTHYROXINE SODIUM 137 MCG: 112 TABLET ORAL at 08:32

## 2019-03-14 RX ADMIN — ALBUTEROL SULFATE 2.5 MG: 2.5 SOLUTION RESPIRATORY (INHALATION) at 08:06

## 2019-03-14 RX ADMIN — BRIVARACETAM 100 MG: 10 SOLUTION ORAL at 21:30

## 2019-03-14 RX ADMIN — SODIUM BICARBONATE 20 MG: 84 INJECTION, SOLUTION INTRAVENOUS at 21:31

## 2019-03-14 RX ADMIN — POTASSIUM & SODIUM PHOSPHATES POWDER PACK 280-160-250 MG 1 PACKET: 280-160-250 PACK at 16:19

## 2019-03-14 RX ADMIN — ACETYLCYSTEINE 2 ML: 200 SOLUTION ORAL; RESPIRATORY (INHALATION) at 10:30

## 2019-03-14 RX ADMIN — SODIUM BICARBONATE 20 MG: 84 INJECTION, SOLUTION INTRAVENOUS at 08:33

## 2019-03-14 RX ADMIN — Medication 250 MG: at 08:32

## 2019-03-14 RX ADMIN — MELATONIN 6 MG: 3 TAB ORAL at 21:31

## 2019-03-14 RX ADMIN — BACITRACIN: 500 OINTMENT TOPICAL at 08:32

## 2019-03-14 RX ADMIN — CARBAMAZEPINE 150 MG: 100 SUSPENSION ORAL at 20:12

## 2019-03-14 RX ADMIN — ALBUTEROL SULFATE 2.5 MG: 2.5 SOLUTION RESPIRATORY (INHALATION) at 15:05

## 2019-03-14 RX ADMIN — POTASSIUM & SODIUM PHOSPHATES POWDER PACK 280-160-250 MG 1 PACKET: 280-160-250 PACK at 21:31

## 2019-03-14 RX ADMIN — ACETYLCYSTEINE 2 ML: 200 SOLUTION ORAL; RESPIRATORY (INHALATION) at 08:06

## 2019-03-14 RX ADMIN — CARBAMAZEPINE 150 MG: 100 SUSPENSION ORAL at 08:32

## 2019-03-14 RX ADMIN — SODIUM CHLORIDE 1000 ML: 9 INJECTION, SOLUTION INTRAVENOUS at 03:53

## 2019-03-14 RX ADMIN — PIPERACILLIN AND TAZOBACTAM 4.5 G: 4; .5 INJECTION, POWDER, FOR SOLUTION INTRAVENOUS at 10:40

## 2019-03-14 RX ADMIN — ACETYLCYSTEINE 2 ML: 200 SOLUTION ORAL; RESPIRATORY (INHALATION) at 18:56

## 2019-03-14 RX ADMIN — SODIUM CHLORIDE: 9 INJECTION, SOLUTION INTRAVENOUS at 06:51

## 2019-03-14 RX ADMIN — BRIVARACETAM 100 MG: 10 SOLUTION ORAL at 10:40

## 2019-03-14 RX ADMIN — CARBAMAZEPINE 150 MG: 100 SUSPENSION ORAL at 16:18

## 2019-03-14 RX ADMIN — ACETYLCYSTEINE 2 ML: 200 SOLUTION ORAL; RESPIRATORY (INHALATION) at 15:06

## 2019-03-14 RX ADMIN — PIPERACILLIN SODIUM,TAZOBACTAM SODIUM 3.38 G: 3; .375 INJECTION, POWDER, FOR SOLUTION INTRAVENOUS at 03:52

## 2019-03-14 ASSESSMENT — ACTIVITIES OF DAILY LIVING (ADL)
ADLS_ACUITY_SCORE: 39

## 2019-03-14 ASSESSMENT — ENCOUNTER SYMPTOMS
COUGH: 1
FEVER: 1

## 2019-03-14 ASSESSMENT — MIFFLIN-ST. JEOR: SCORE: 1639.11

## 2019-03-14 NOTE — ED NOTES
Patient's mother/guardian reports she wants to go home now; reports she is tired and has a ride home. Patient reports she would like an update in the AM from either the doctor or the nurse via phone.

## 2019-03-14 NOTE — PHARMACY-ADMISSION MEDICATION HISTORY
Admission medication history interview status for the 3/14/2019  admission is complete. See EPIC admission navigator for prior to admission medications     Medication history source reliability:Good    Actions taken by pharmacist (provider contacted, etc):None     Additional medication history information not noted on PTA med list :None    Medication reconciliation/reorder completed by provider prior to medication history? No    Time spent in this activity: 15 minutes    Prior to Admission medications    Medication Sig Last Dose Taking? Auth Provider   acetaminophen (TYLENOL) 500 MG tablet 1,000 mg by Oral or FT or NG tube route every 6 hours as needed for mild pain 3/14/2019 at am Yes Unknown, Entered By History   acetylcysteine (MUCOMYST) 20 % neb solution Take 2 mLs by nebulization 4 times daily 3/14/2019 at am Yes Starr Champion MD   albuterol (PROVENTIL) (5 MG/ML) 0.5% neb solution Take 0.5 mLs (2.5 mg) by nebulization every 4 hours (while awake) 3/14/2019 at am Yes Starr Champion MD   amoxicillin-clavulanate (AUGMENTIN) 875-125 MG tablet Take 1 tablet by mouth every 12 hours for 7 days 3/14/2019 at am Yes Starr Champion MD   bacitracin ointment Apply topically daily To PEG site.  3/14/2019 at am Yes Unknown, Entered By History   Brivaracetam (BRIVIACT) 10 MG/ML solution 100 mg by Oral or FT or NG tube route 2 times daily @0900 and 2100 3/14/2019 at am Yes Reported, Patient   calcium carbonate 1250 (500 CA) MG/5ML SUSP suspension 5 mLs (1,250 mg) by Per J Tube route 3 times daily (with meals)  Patient taking differently: by Per J Tube route 3 times daily (with meals)  3/14/2019 at am Yes Mariana Venegas MD   carBAMazepine (TEGRETOL) 100 MG/5ML suspension Take 150 mg by mouth every 6 hours At 06:00, 12:00, 18:00 and 24:00 for seizures 3/14/2019 at am Yes Unknown, Entered By History   Guar Gum (FIBER MODULAR, NUTRISOURCE FIBER,) packet 1 packet by Per G Tube route daily 3/14/2019 at Unknown time Yes  Starr Champion MD   hydrocortisone (CORTEF) 5 MG tablet 10 mg by Oral or FT or NG tube route daily (with dinner) At 1500 3/14/2019 at am Yes Unknown, Entered By History   hydrocortisone (CORTEF) 5 MG tablet 20 mg by Oral or FT or NG tube route every morning  3/14/2019 at am Yes Unknown, Entered By History   levothyroxine (SYNTHROID/LEVOTHROID) 137 MCG tablet 137 mcg by Oral or FT or NG tube route daily 3/14/2019 at am Yes Unknown, Entered By History   Multiple Vitamins-Minerals (CENTRUM SILVER) per tablet Take 1 tablet by mouth daily Crush and feed via j-tube 3/14/2019 at am Yes Unknown, Entered By History   order for DME Equipment being ordered: Nebulizer 3/14/2019 at am Yes Starr Champion MD   pantoprazole (PROTONIX) 2 mg/mL SUSP suspension 20 mg by Per NG tube route 2 times daily  3/14/2019 at am Yes Unknown, Entered By History   potassium & sodium phosphates (NEUTRA-PHOS) 280-160-250 MG Packet Take 1 packet by mouth 3 times daily 0900, 1500, 2100.  3/14/2019 at am Yes Unknown, Entered By History   saccharomyces boulardii (FLORASTOR) 250 MG capsule 250 mg by Oral or Feeding Tube route daily 3/14/2019 at am Yes Unknown, Entered By History   Vitamin D, Cholecalciferol, 1000 units TABS Take 2,000 Units by mouth every morning Crush and feed via j-tube 3/14/2019 at am Yes Unknown, Entered By History   melatonin (MELATONIN) 1 MG/ML LIQD liquid 6 mg by Per NG tube route At Bedtime    Unknown, Entered By History   testosterone cypionate (DEPOTESTOTERONE CYPIONATE) 200 MG/ML injection Inject 76 mg into the muscle See Admin Instructions Every 2 weeks on Fridays   76 mg or 0.38 mL Unknown at Unknown time  Unknown, Entered By History     Argenis Thrasher   Pharm D. Student

## 2019-03-14 NOTE — PHARMACY-ADMISSION MEDICATION HISTORY
Admission medication history interview status for the 3/14/2019  admission is complete. See EPIC admission navigator for prior to admission medications     Medication history source reliability:Good    Actions taken by pharmacist (provider contacted, etc): discharge epic notes 3-12-19     Additional medication history information not noted on PTA med list :None    Medication reconciliation/reorder completed by provider prior to medication history? No    Time spent in this activity: 20 min    Prior to Admission medications    Medication Sig Last Dose Taking? Auth Provider   acetaminophen (TYLENOL) 500 MG tablet 1,000 mg by Oral or FT or NG tube route every 6 hours as needed for mild pain 3/13/2019 at am Yes Unknown, Entered By History   acetylcysteine (MUCOMYST) 20 % neb solution Take 2 mLs by nebulization 4 times daily 3/13/2019 at am Yes Starr Champion MD   albuterol (PROVENTIL) (5 MG/ML) 0.5% neb solution Take 0.5 mLs (2.5 mg) by nebulization every 4 hours (while awake) 3/13/2019 at am Yes Starr Champion MD   amoxicillin-clavulanate (AUGMENTIN) 875-125 MG tablet Take 1 tablet by mouth every 12 hours for 7 days 3/13/2019 at am Yes Starr Champion MD   bacitracin ointment Apply topically daily To PEG site.  3/13/2019 at am Yes Unknown, Entered By History   Brivaracetam (BRIVIACT) 10 MG/ML solution 100 mg by Oral or FT or NG tube route 2 times daily @0900 and 2100 3/13/2019 at am Yes Reported, Patient   calcium carbonate 1250 (500 CA) MG/5ML SUSP suspension 5 mLs (1,250 mg) by Per J Tube route 3 times daily (with meals)  Patient taking differently: by Per J Tube route 3 times daily (with meals)  3/13/2019 at am Yes Mariana Venegas MD   carBAMazepine (TEGRETOL) 100 MG/5ML suspension Take 150 mg by mouth every 6 hours At 06:00, 12:00, 18:00 and 24:00 for seizures 3/13/2019 at am Yes Unknown, Entered By History   Guar Gum (FIBER MODULAR, NUTRISOURCE FIBER,) packet 1 packet by Per G Tube route daily 3/13/2019 at  Unknown time Yes Starr Champion MD   hydrocortisone (CORTEF) 5 MG tablet 10 mg by Oral or FT or NG tube route daily (with dinner) At 1500 3/13/2019 at am Yes Unknown, Entered By History   hydrocortisone (CORTEF) 5 MG tablet 20 mg by Oral or FT or NG tube route every morning  3/13/2019 at am Yes Unknown, Entered By History   levothyroxine (SYNTHROID/LEVOTHROID) 137 MCG tablet 137 mcg by Oral or FT or NG tube route daily 3/13/2019 at am Yes Unknown, Entered By History   melatonin (MELATONIN) 1 MG/ML LIQD liquid 6 mg by Per NG tube route At Bedtime  3/13/2019 at Unknown time Yes Unknown, Entered By History   Multiple Vitamins-Minerals (CENTRUM SILVER) per tablet Take 1 tablet by mouth daily Crush and feed via j-tube 3/13/2019 at am Yes Unknown, Entered By History   pantoprazole (PROTONIX) 2 mg/mL SUSP suspension 20 mg by Per NG tube route 2 times daily  3/13/2019 at am Yes Unknown, Entered By History   potassium & sodium phosphates (NEUTRA-PHOS) 280-160-250 MG Packet Take 1 packet by mouth 3 times daily 0900, 1500, 2100.  3/13/2019 at am Yes Unknown, Entered By History   saccharomyces boulardii (FLORASTOR) 250 MG capsule 250 mg by Oral or Feeding Tube route daily 3/13/2019 at am Yes Unknown, Entered By History   Vitamin D, Cholecalciferol, 1000 units TABS Take 2,000 Units by mouth every morning Crush and feed via j-tube 3/13/2019 at am Yes Unknown, Entered By History   order for DME Equipment being ordered: Nebulizer   Starr Champion MD   testosterone cypionate (DEPOTESTOTERONE CYPIONATE) 200 MG/ML injection Inject 76 mg into the muscle See Admin Instructions Every 2 weeks on Fridays   76 mg or 0.38 mL Unknown at Unknown time  Unknown, Entered By History

## 2019-03-14 NOTE — PLAN OF CARE
VSS.  Pt does not show indicators of pain.  Unable to assess nausea or neuropathy.  Pt is nonverbal.  L PIV is WDL, infusing.  G tube site is clamped, dressing changed.  Site has erythema, bacitracin applied.  Pt turned and repositioned with A1.  Incontinent of urine and stool.  Plan of care: continue to monitor.

## 2019-03-14 NOTE — PROGRESS NOTES
[facility]    Medicine Progress Note - Hospitalist Service        Date of Admission:  3/14/2019  1:38 AM    Assessment & Plan:   Mr. Farias is a 58-year-old male with a past medical history significant for recurrent aspiration pneumonia, traumatic brain injury with hemiplegia and dysphagia, as well as seizure disorder, who presents to the Emergency Department with worsening shortness of breath and difficulty managing his secretions with recurrent or worsening right lower lobe aspiration pneumonia.      Recurrent right basilar aspiration pneumonia:    -He was just discharged 48 hours prior to current presentation with similar suspected aspiration pneumonia  -Readmitted with worsening dyspnea and increasing issues with secretions  -Low-grade fever up to 100  F  -White cell count jumped up to 24,000 from 7.53 days ago  -Chest x-ray shows right basilar infiltrate slightly worse than previous exam  -Continue treatment for recurrent aspiration pneumonia with Zosyn  -Discussed with the patient's mother, she is also obviously discouraged by the recent recurrent admissions for aspirations.  Suggested ENT evaluation for recurrent aspiration evaluation, ? Trach.  She is open to that idea.    Seizure disorder:    -Continue PTA brivaracetam and Tegretol.     Gastroesophageal reflux disease:    -Continue with PPI.     Hypothyroidism:    -Continue with levothyroxine.     Panhypopituitarism:    -Continue with hydrocortisone and Synthroid.       Diet:NPO, Tube feeding  DVT Prophylaxis: Pneumatic Compression Devices   Lerma Catheter: not present  Code Status: Full Code     Disposition Plan    Expected discharge: 2 - 3 days, recommended to prior living arrangement.  Entered: Migel Stoddard MD 03/14/2019, 11:51 AM        The patient's care was discussed with the bedside nurse  Migel Stoddard MD  Hospitalist Service  [facility]  Hospital    ______________________________________________________________________    Interval History   Low-grade fever of 100.  Patient is nonverbal at baseline.  No acute nursing concerns.  Blood pressure stable, saturating on room air.    Data reviewed today: I reviewed all medications, new labs and imaging results over the last 24 hours. I personally reviewed no images or EKG's today.    Physical Exam   Vital signs:  Temp: 97.5  F (36.4  C) Temp src: Axillary BP: 116/78 Pulse: 92 Heart Rate: 82 Resp: 18 SpO2: 95 % O2 Device: None (Room air)   Height: 182.9 cm (6') Weight: 77.1 kg (170 lb)  Estimated body mass index is 23.06 kg/m  as calculated from the following:    Height as of this encounter: 1.829 m (6').    Weight as of this encounter: 77.1 kg (170 lb).      Wt Readings from Last 2 Encounters:   03/14/19 77.1 kg (170 lb)   03/11/19 77.6 kg (171 lb)       Gen: Awake, nonverbal, a little slow from his baseline, responds by gesturing  HEENT:  no pallor  Resp: Coarse breath sounds bilaterally, R>L, normal work of breathing.  CVS: RRR, no murmur  Abd/GI: Soft, non-tender.  PEG in place  Skin: Warm, dry no rashes  MSK: No joint deformities, no pedal edema  Neuro- CN- intact. No focal deficits.  Spontaneously moving all 4 extremities      Data   Recent Labs   Lab 03/14/19  0215 03/11/19  0724 03/10/19  0734 03/09/19 2000   WBC 24.1* 7.5 14.3* 21.5*   HGB 12.7* 10.5* 10.5* 13.9   MCV 81 82 82 82    117* 127* 166    139 137 135   POTASSIUM 3.7 3.9 3.6 3.9   CHLORIDE 103 107 106 97   CO2 27 28 27 32   BUN 15 10 12 19   CR 0.85 0.72 0.76 0.82   ANIONGAP 8 4 4 6   STEVE 8.7 8.0* 7.9* 8.9   * 90 109* 129*   ALBUMIN 3.0*  --   --  3.5   PROTTOTAL 8.0  --   --  8.7   BILITOTAL 0.4  --   --  0.2   ALKPHOS 84  --   --  103   ALT 38  --   --  38   AST 24  --   --  23       Recent Results (from the past 24 hour(s))   XR Chest 2 Views    Narrative    XR CHEST 2 VW  3/14/2019 2:47 AM     HISTORY: History of  pneumonia.    COMPARISON: 3/9/2019.    FINDINGS: The heart size is normal. There is right basilar infiltrate  which appears slightly worse than on the previous exam. Mild  atelectasis or scarring at the left lung base. The right hemidiaphragm  is elevated. There is no pneumothorax or pleural effusion.      Impression    IMPRESSION: Right basilar pneumonia.    BECKIE KATE MD     Medications     sodium chloride 50 mL/hr at 03/14/19 0651       acetylcysteine  2 mL Nebulization 4x Daily     albuterol  2.5 mg Nebulization Q4H While awake     bacitracin   Topical Daily     Brivaracetam  100 mg Per G Tube BID     carBAMazepine  150 mg Per G Tube Q6H     fiber modular (NUTRISOURCE FIBER)  1 packet Per G Tube Daily     hydrocortisone  10 mg Per G Tube Daily with supper     hydrocortisone  20 mg Per G Tube QAM     levothyroxine  137 mcg Per G Tube Daily     melatonin  6 mg Per G Tube At Bedtime     pantoprazole  20 mg Per NG tube BID     piperacillin-tazobactam  4.5 g Intravenous Q6H     potassium & sodium phosphates  1 packet Oral TID     saccharomyces boulardii  250 mg Oral or Feeding Tube Daily

## 2019-03-14 NOTE — ED PROVIDER NOTES
History     Chief Complaint:  Cough and Fever    HPI   Keyon Farias is a 58 year old male who presents with cough and fever. The wife note the patient was discharged from [facility] on 3/12 at 1400  after diagnosis of pneumonia. The patient was prescribed Augmentin.  The wife notes that today, the next day, the patient had increased sputum production so she returned to the ED. Wife states she noticed the patient's oxygen saturation deteriorating  this afternoon then spiked fever of 102 at home.  History obtained from his wife as the patient is non verbal.    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    Social History:  Smoking Status: former smoker  Smokeless Tobacco: Never Used  Alcohol Use: No  Marital Status:  Single      Review of Systems   Constitutional: Positive for fever.   Respiratory: Positive for cough.    All other systems reviewed and are negative.    Physical Exam     Patient Vitals for the past 24 hrs:   BP Temp Temp src Pulse Heart Rate Resp SpO2 Height Weight   03/14/19 0350 116/78 -- -- 101 -- -- 93 % -- --   03/14/19 0214 -- -- -- -- -- -- 91 % -- --   03/14/19 0145 95/76 100  F (37.8  C) Oral 117 117 24 93 % 1.829 m (6') 77.1 kg (170 lb)     Physical  Exam  Nursing note and vitals reviewed.  General: Alert.  Appears well-developed and well-nourished.   Head: No signs of trauma.   Mouth/Throat: dry mucous membranes.   Eyes: Conjunctivae are normal. Pupils are equal, round, and reactive to light.   Neck: Normal range of motion. No nuchal rigidity.   Cardiovascular: Normal rate and regular rhythm.    Respiratory: Crackles at right lung base.   Abdominal: Soft. There is no tenderness. There is no guarding.   Musculoskeletal: Normal range of motion. no edema.   Neurological: The patient is alert   PERRLA, EOMI, visual fields intact,  Sensation appears normal.   GCS eye subscore is 4. GCS verbal subscore is 5. GCS motor subscore is 6.   chronic atrophy and contractures due paraplegia   Skin: Skin is warm and dry. No rash noted.   Psychiatric: baseline mood and affect. behavior is normal.     Emergency Department Course   Imaging:  Radiology findings were communicated with the patient and his wife who voiced understanding of the findings.    XR Chest   Right basilar pneumonia.   Reading per radiology    Laboratory:  Laboratory findings were communicated with the patient and his wife who voiced understanding of the findings.    CBC: WBC 24.1(H), HGB 12.7(L),   CMP: glucose 128(H), albumin 3.0(L) o/w WNL (Creatinine 0.85)  Lactic acid: 1.5  Blood culture pending x2    Interventions:  0352 Zosyn 3.375 g IV  0353 NS 1000 mL IV    Emergency Department Course:     Nursing notes and vitals reviewed.    0215 IV was inserted and blood was drawn for laboratory testing, results above.    0231 The patient was sent for a XR Chest while in the emergency department, results above.     0250 I performed an exam of the patient as documented above.     0317 I spoke with Dr. Fuchs of the Hospitalist service from [facility] regarding patient's presentation, findings, and plan of care.    0430 I personally reviewed the laboratory and imaging results with the patient and  answered all related questions prior to admittance.    Impression & Plan      Medical Decision Making:  This patient presents to the ED after being discharged 2 days prior while being treated for sepsis secondary to pneumonia. He developed a fever and decreased oxygen saturation at home while oral antibiotics Augmentin. The patient's not showing signs of sepsis now but is showing an elevation of his white count and a worsening basilar infiltrate on Xray. I think it is appropriate start the patient on IV antibiotics. I spoke with Dr. Fuchs who is in agreement and will admit the patient.      Diagnosis:    ICD-10-CM    1. Pneumonia of right lower lobe due to infectious organism (H) J18.1 Blood culture     Blood culture     Disposition:   The patient is admitted into the care of Dr. Fuchs.    Scribe Disclosure:  I, Corinna Hwang, am serving as a scribe at 3:16 AM on 3/14/2019 to document services personally performed by Levi Castle MD based on my observations and the provider's statements to me.   EMERGENCY DEPARTMENT       Levi Castle MD  03/14/19 8175

## 2019-03-14 NOTE — PROGRESS NOTES
[facility]  Patient is currently open to home care services with [facility]. The patient is currently receiving Skilled Nursing services. [facility]  and team have been notified of patient admission.  [facility] liaison will continue to follow patient during stay. If appropriate provide orders to resume home care at time of discharge.
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
## 2019-03-14 NOTE — PLAN OF CARE
RECEIVING UNIT ED HANDOFF REVIEW    ED Nurse Handoff Report was reviewed by: Nidia Oconnor on March 14, 2019 at 3:48 AM

## 2019-03-14 NOTE — H&P
Admitted:     03/14/2019      PRIMARY CARE PHYSICIAN:  Dr. Carlos Gomez      CODE STATUS:  FULL CODE.      CHIEF COMPLAINT:  Shortness of breath.      HISTORY OF PRESENT ILLNESS:  Mr. Farias is a 58-year-old male with a past medical history significant for traumatic brain injury with resultant right-sided hemiplegia, dysphagia, seizure disorder and recurrent admissions for aspiration pneumonia, who presents to the Emergency Department with concerns for recurrent aspiration pneumonia.  History is obtained through discussion with the ED physician and chart review.  The patient was actually just discharged from our facility 2 days prior for a similar presentation.  He was admitted on 03/0/2019 with concerns for aspiration pneumonia, which had been at least his seventh admission in the past few months.  The patient was initiated on vancomycin and Zosyn.  The vancomycin was quickly discontinued.  The patient with treatment of Zosyn had improvement in his respiratory symptoms and normalization of his elevated white count.  Pulmonology saw the patient and noted that he had difficulty managing his secretions and so Mucomyst nebs and albuterol nebs were added.  Pulmonology notes that if he were to have recurrent issues with aspiration, ENT consult could be considered to discuss the possibility of something like a tracheostomy, which would help manage his secretions and possibly prevent aspiration.  It sounds like the patient was doing quite well on discharge around 2 p.m. on Tuesday and did well for that first day.  He had increasing issues with his secretions it sounds like on Wednesday and got more short of breath later in the evening, prompting a return to the ED a little over 24 hours after discharge.  Here, he was noted to have a white count up to 20 again after it had normalized and a chest x-ray showing worsening right lower lobe infiltrate.  This is concerning for recurrent or worsening aspiration pneumonia, so Zosyn  was initiated and he was admitted to the Hospitalist Service.      When I saw the patient, he seemed to be resting fairly comfortably, though seems to understand but is otherwise nonverbal.  The patient's mother, who I believe is his guardian, had just left the hospital a bit prior.       PAST MEDICAL HISTORY:   1.  Traumatic brain injury with resultant right-sided hemiplegia.   2.  Dysphagia secondary to TBI with a G-tube in place.   3.  Seizure disorder.   4.  Panhypopituitarism.   5.  Hypothyroidism.   6.  History of ventricular fibrillation.   7.  Previous stroke.   8.  GERD.      MEDICATIONS:    Medications Prior to Admission   Medication Sig Dispense Refill Last Dose     acetaminophen (TYLENOL) 500 MG tablet 1,000 mg by Oral or FT or NG tube route every 6 hours as needed for mild pain   3/13/2019 at am     acetylcysteine (MUCOMYST) 20 % neb solution Take 2 mLs by nebulization 4 times daily 300 vial 0 3/13/2019 at am     albuterol (PROVENTIL) (5 MG/ML) 0.5% neb solution Take 0.5 mLs (2.5 mg) by nebulization every 4 hours (while awake) 360 mL 0 3/13/2019 at am     amoxicillin-clavulanate (AUGMENTIN) 875-125 MG tablet Take 1 tablet by mouth every 12 hours for 7 days 14 tablet 0 3/13/2019 at am     bacitracin ointment Apply topically daily To PEG site.    3/13/2019 at am     Brivaracetam (BRIVIACT) 10 MG/ML solution 100 mg by Oral or FT or NG tube route 2 times daily @0900 and 2100   3/13/2019 at am     calcium carbonate 1250 (500 CA) MG/5ML SUSP suspension 5 mLs (1,250 mg) by Per J Tube route 3 times daily (with meals) (Patient taking differently: by Per J Tube route 3 times daily (with meals) ) 450 mL  3/13/2019 at am     carBAMazepine (TEGRETOL) 100 MG/5ML suspension Take 150 mg by mouth every 6 hours At 06:00, 12:00, 18:00 and 24:00 for seizures   3/13/2019 at am     Guar Gum (FIBER MODULAR, NUTRISOURCE FIBER,) packet 1 packet by Per G Tube route daily 30 packet 0 3/13/2019 at Unknown time     hydrocortisone  (CORTEF) 5 MG tablet 10 mg by Oral or FT or NG tube route daily (with dinner) At 1500   3/13/2019 at am     hydrocortisone (CORTEF) 5 MG tablet 20 mg by Oral or FT or NG tube route every morning    3/13/2019 at am     levothyroxine (SYNTHROID/LEVOTHROID) 137 MCG tablet 137 mcg by Oral or FT or NG tube route daily   3/13/2019 at am     melatonin (MELATONIN) 1 MG/ML LIQD liquid 6 mg by Per NG tube route At Bedtime    3/13/2019 at Unknown time     Multiple Vitamins-Minerals (CENTRUM SILVER) per tablet Take 1 tablet by mouth daily Crush and feed via j-tube   3/13/2019 at am     pantoprazole (PROTONIX) 2 mg/mL SUSP suspension 20 mg by Per NG tube route 2 times daily    3/13/2019 at am     potassium & sodium phosphates (NEUTRA-PHOS) 280-160-250 MG Packet Take 1 packet by mouth 3 times daily 0900, 1500, 2100.    3/13/2019 at am     saccharomyces boulardii (FLORASTOR) 250 MG capsule 250 mg by Oral or Feeding Tube route daily   3/13/2019 at am     Vitamin D, Cholecalciferol, 1000 units TABS Take 2,000 Units by mouth every morning Crush and feed via j-tube   3/13/2019 at am     order for DME Equipment being ordered: Nebulizer 1 Units 0      testosterone cypionate (DEPOTESTOTERONE CYPIONATE) 200 MG/ML injection Inject 76 mg into the muscle See Admin Instructions Every 2 weeks on Fridays   76 mg or 0.38 mL   Unknown at Unknown time           SOCIAL HISTORY:  The patient is a former smoker, and there is no reported alcohol use.      FAMILY HISTORY:  Unable to obtain as the patient is nonverbal.      ALLERGIES:  DILANTIN AND VALPROIC ACID.      REVIEW OF SYSTEMS:  Unable to obtain as patient is nonverbal.      PHYSICAL EXAMINATION:   VITAL SIGNS:  Blood pressure of 116/78, heart rate in the low 100s, respirations 24, satting 93% on room air with temperature of 100 degrees Fahrenheit.   GENERAL:  The patient is lying in bed and appears fairly comfortable.   HEENT:  Pupils equal, round, reactive to light.  Extraocular muscle  function intact.  No scleral icterus.  Oropharynx shows dry mucous membranes.   NECK:  No lymphadenopathy or thyromegaly.   CARDIOVASCULAR:  Heart is regular rate and rhythm without any murmur, rub or gallop.   PULMONARY:  A few scattered wheezes.  No rhonchorous or coarse sounds.   GASTROINTESTINAL:  G-tube is in place.  Positive bowel sounds.  Soft and nontender.   SKIN:  No rashes or lesions.   LYMPHATICS:  No peripheral edema.   PSYCHIATRIC:  The patient is alert and follows with his eyes and seems to understand the conversation.      LABORATORY DATA:  WBC count 24 with left shift, hemoglobin 12.7 and platelets of 182.  BMP and LFTs are unremarkable.  Lactic acid is normal.  Chest x-ray as above.      ASSESSMENT AND PLAN:  Mr. Farias is a 58-year-old male with a past medical history significant for recurrent aspiration pneumonia, traumatic brain injury with hemiplegia and dysphagia, as well as seizure disorder, who presents to the Emergency Department with worsening shortness of breath and difficulty managing his secretions with recurrent or worsening right lower lobe aspiration pneumonia.      1.  Recurrent right basilar aspiration pneumonia:  The patient now has a white count that is 24 after it had normalized and increasing shortness of breath and secretions, potentially consistent with re-aspiration and worsening pneumonia.  We will place him back on Zosyn and monitor for any fever and his white counts.  After a potential discussion with his guardian in the morning, could consider ENT consult for further evaluation of whether a tracheostomy may be warranted.   2.  Seizure disorder:  We will continue with his PTA regimen, which includes brivaracetam and Tegretol.   3.  Gastroesophageal reflux disease:  Continue with PPI.   4.  Hypothyroidism:  Continue with levothyroxine.   5.  Panhypopituitarism:  Continue with hydrocortisone and Synthroid.   6.  Deep venous thrombosis prophylaxis:  SCDsMaurizio TAI  DO MITCHELL             D: 2019   T: 2019   MT: KOL[place]      Name:     BERT BARAJAS   MRN:      [id number]        Account:      [id number]   :      1960        Admitted:     2019                   Document: [id number]       cc: Carlos Gomez MD

## 2019-03-14 NOTE — PLAN OF CARE
Admission    Patient arrives to room 612 via cart from ED.  Care plan note:   Alert, non verbal.  Follows commands, calm and cooperative.  VSS on room air.  LS coarse.  Infrequent cough.  Turn/repo q 2 hrs.  G-tube clamped.  PIV with IVF infusing @50 mL/hr.  Continue to monitor.    Inpatient nursing criteria listed below were met:    PCD's Documented: No  Skin issues/needs documented :Yes  Isolation education started/completed No  Patient allergies verified with patient: No  Verified completion of [place] Risk Assessment Tool:  Yes  Verified completion of Guardianship screening tool: Yes  Fall Prevention: Care plan updated, Education given and documented Yes  Care Plan initiated: Yes  Home medications documented in belongings flowsheet: NA  Patient belongings documented in belongings flowsheet: NA  Reminder note (belongings/ medications) placed in discharge instructions: NA  Admission profile/ required documentation complete: No

## 2019-03-14 NOTE — PROGRESS NOTES
Pt is on room air with SpO2 93-96%. Breath sounds were coarse.   All nebs were given as ordered by MD and RT will continue to monitor and follow.     Elen Corley, RRT  3/14/2019 3:09 PM

## 2019-03-15 LAB
ANION GAP SERPL CALCULATED.3IONS-SCNC: 9 MMOL/L (ref 3–14)
BACTERIA SPEC CULT: NO GROWTH
BACTERIA SPEC CULT: NO GROWTH
BUN SERPL-MCNC: 9 MG/DL (ref 7–30)
CALCIUM SERPL-MCNC: 8.2 MG/DL (ref 8.5–10.1)
CHLORIDE SERPL-SCNC: 110 MMOL/L (ref 94–109)
CO2 SERPL-SCNC: 24 MMOL/L (ref 20–32)
CREAT SERPL-MCNC: 0.96 MG/DL (ref 0.66–1.25)
ERYTHROCYTE [DISTWIDTH] IN BLOOD BY AUTOMATED COUNT: 15.1 % (ref 10–15)
GFR SERPL CREATININE-BSD FRML MDRD: 88 ML/MIN/{1.73_M2}
GLUCOSE SERPL-MCNC: 93 MG/DL (ref 70–99)
HCT VFR BLD AUTO: 30.7 % (ref 40–53)
HGB BLD-MCNC: 9.9 G/DL (ref 13.3–17.7)
Lab: NORMAL
Lab: NORMAL
MCH RBC QN AUTO: 26.5 PG (ref 26.5–33)
MCHC RBC AUTO-ENTMCNC: 32.2 G/DL (ref 31.5–36.5)
MCV RBC AUTO: 82 FL (ref 78–100)
PLATELET # BLD AUTO: 138 10E9/L (ref 150–450)
POTASSIUM SERPL-SCNC: 3.5 MMOL/L (ref 3.4–5.3)
RBC # BLD AUTO: 3.73 10E12/L (ref 4.4–5.9)
SODIUM SERPL-SCNC: 143 MMOL/L (ref 133–144)
SPECIMEN SOURCE: NORMAL
SPECIMEN SOURCE: NORMAL
WBC # BLD AUTO: 6.9 10E9/L (ref 4–11)

## 2019-03-15 PROCEDURE — 94640 AIRWAY INHALATION TREATMENT: CPT

## 2019-03-15 PROCEDURE — 40000275 ZZH STATISTIC RCP TIME EA 10 MIN

## 2019-03-15 PROCEDURE — 94640 AIRWAY INHALATION TREATMENT: CPT | Mod: 76

## 2019-03-15 PROCEDURE — 25000128 H RX IP 250 OP 636: Performed by: INTERNAL MEDICINE

## 2019-03-15 PROCEDURE — 27210429 ZZH NUTRITION PRODUCT INTERMEDIATE LITER

## 2019-03-15 PROCEDURE — 85027 COMPLETE CBC AUTOMATED: CPT | Performed by: INTERNAL MEDICINE

## 2019-03-15 PROCEDURE — 25000125 ZZHC RX 250: Performed by: INTERNAL MEDICINE

## 2019-03-15 PROCEDURE — 80048 BASIC METABOLIC PNL TOTAL CA: CPT | Performed by: INTERNAL MEDICINE

## 2019-03-15 PROCEDURE — 12000000 ZZH R&B MED SURG/OB

## 2019-03-15 PROCEDURE — 25800030 ZZH RX IP 258 OP 636: Performed by: INTERNAL MEDICINE

## 2019-03-15 PROCEDURE — A9270 NON-COVERED ITEM OR SERVICE: HCPCS | Mod: GY | Performed by: INTERNAL MEDICINE

## 2019-03-15 PROCEDURE — 36415 COLL VENOUS BLD VENIPUNCTURE: CPT | Performed by: INTERNAL MEDICINE

## 2019-03-15 PROCEDURE — 25000132 ZZH RX MED GY IP 250 OP 250 PS 637: Mod: GY | Performed by: INTERNAL MEDICINE

## 2019-03-15 PROCEDURE — 99232 SBSQ HOSP IP/OBS MODERATE 35: CPT | Performed by: INTERNAL MEDICINE

## 2019-03-15 RX ADMIN — Medication 250 MG: at 09:31

## 2019-03-15 RX ADMIN — CARBAMAZEPINE 150 MG: 100 SUSPENSION ORAL at 14:49

## 2019-03-15 RX ADMIN — SODIUM BICARBONATE 20 MG: 84 INJECTION, SOLUTION INTRAVENOUS at 09:29

## 2019-03-15 RX ADMIN — PIPERACILLIN AND TAZOBACTAM 4.5 G: 4; .5 INJECTION, POWDER, FOR SOLUTION INTRAVENOUS at 21:54

## 2019-03-15 RX ADMIN — ALBUTEROL SULFATE 2.5 MG: 2.5 SOLUTION RESPIRATORY (INHALATION) at 11:00

## 2019-03-15 RX ADMIN — BRIVARACETAM 100 MG: 10 SOLUTION ORAL at 21:55

## 2019-03-15 RX ADMIN — ALBUTEROL SULFATE 2.5 MG: 2.5 SOLUTION RESPIRATORY (INHALATION) at 07:05

## 2019-03-15 RX ADMIN — CARBAMAZEPINE 150 MG: 100 SUSPENSION ORAL at 09:29

## 2019-03-15 RX ADMIN — PIPERACILLIN AND TAZOBACTAM 4.5 G: 4; .5 INJECTION, POWDER, FOR SOLUTION INTRAVENOUS at 16:59

## 2019-03-15 RX ADMIN — ACETYLCYSTEINE 2 ML: 200 SOLUTION ORAL; RESPIRATORY (INHALATION) at 11:01

## 2019-03-15 RX ADMIN — HYDROCORTISONE 20 MG: 20 TABLET ORAL at 09:31

## 2019-03-15 RX ADMIN — ACETYLCYSTEINE 2 ML: 200 SOLUTION ORAL; RESPIRATORY (INHALATION) at 07:05

## 2019-03-15 RX ADMIN — MELATONIN 6 MG: 3 TAB ORAL at 21:54

## 2019-03-15 RX ADMIN — LEVOTHYROXINE SODIUM 137 MCG: 112 TABLET ORAL at 09:30

## 2019-03-15 RX ADMIN — HYDROCORTISONE 10 MG: 10 TABLET ORAL at 16:59

## 2019-03-15 RX ADMIN — POTASSIUM & SODIUM PHOSPHATES POWDER PACK 280-160-250 MG 1 PACKET: 280-160-250 PACK at 21:54

## 2019-03-15 RX ADMIN — ALBUTEROL SULFATE 2.5 MG: 2.5 SOLUTION RESPIRATORY (INHALATION) at 20:04

## 2019-03-15 RX ADMIN — PIPERACILLIN AND TAZOBACTAM 4.5 G: 4; .5 INJECTION, POWDER, FOR SOLUTION INTRAVENOUS at 04:14

## 2019-03-15 RX ADMIN — BRIVARACETAM 100 MG: 10 SOLUTION ORAL at 09:30

## 2019-03-15 RX ADMIN — SODIUM CHLORIDE: 9 INJECTION, SOLUTION INTRAVENOUS at 04:14

## 2019-03-15 RX ADMIN — POTASSIUM & SODIUM PHOSPHATES POWDER PACK 280-160-250 MG 1 PACKET: 280-160-250 PACK at 16:59

## 2019-03-15 RX ADMIN — SODIUM BICARBONATE 20 MG: 84 INJECTION, SOLUTION INTRAVENOUS at 21:55

## 2019-03-15 RX ADMIN — POTASSIUM & SODIUM PHOSPHATES POWDER PACK 280-160-250 MG 1 PACKET: 280-160-250 PACK at 09:30

## 2019-03-15 RX ADMIN — BACITRACIN: 500 OINTMENT TOPICAL at 09:29

## 2019-03-15 RX ADMIN — CARBAMAZEPINE 150 MG: 100 SUSPENSION ORAL at 21:55

## 2019-03-15 RX ADMIN — ACETYLCYSTEINE 2 ML: 200 SOLUTION ORAL; RESPIRATORY (INHALATION) at 15:36

## 2019-03-15 RX ADMIN — ALBUTEROL SULFATE 2.5 MG: 2.5 SOLUTION RESPIRATORY (INHALATION) at 15:36

## 2019-03-15 RX ADMIN — CARBAMAZEPINE 150 MG: 100 SUSPENSION ORAL at 02:14

## 2019-03-15 RX ADMIN — ACETYLCYSTEINE 4 ML: 200 SOLUTION ORAL; RESPIRATORY (INHALATION) at 20:04

## 2019-03-15 RX ADMIN — PIPERACILLIN AND TAZOBACTAM 4.5 G: 4; .5 INJECTION, POWDER, FOR SOLUTION INTRAVENOUS at 09:53

## 2019-03-15 ASSESSMENT — ACTIVITIES OF DAILY LIVING (ADL)
ADLS_ACUITY_SCORE: 39

## 2019-03-15 ASSESSMENT — MIFFLIN-ST. JEOR: SCORE: 1583

## 2019-03-15 NOTE — PROGRESS NOTES
[facility]    Medicine Progress Note - Hospitalist Service        Date of Admission:  3/14/2019  1:38 AM    Assessment & Plan:   Mr. Farias is a 58-year-old male with a past medical history significant for recurrent aspiration pneumonia, traumatic brain injury with hemiplegia and dysphagia, as well as seizure disorder, who presents to the Emergency Department with worsening shortness of breath and difficulty managing his secretions with recurrent or worsening right lower lobe aspiration pneumonia.      Recurrent aspiration pneumonia:    -He was just discharged 48 hours prior to current presentation with similar suspected aspiration pneumonia  -Readmitted with worsening dyspnea and increasing issues with secretions  -Low-grade fever up to 100  F  -White cell count jumped up to 24,000 from 7.53 days ago  -Chest x-ray shows right basilar infiltrate slightly worse than previous exam  -Continue treatment for recurrent aspiration pneumonia with Zosyn  -Discussed with the patient's mother, she is also obviously discouraged by the recent recurrent admissions for aspirations.  He has had 5 admissions since January for the same problem.  -Suggested ENT evaluation for recurrent aspiration evaluation, ? Trach.  Apparently trach will not be done by ENT, apparently only done by Dr. Ortiz at [facility].  Consult placed.    Seizure disorder:    -Continue PTA brivaracetam and Tegretol.     Gastroesophageal reflux disease:    -Continue with PPI.     Hypothyroidism:    -Continue with levothyroxine.     Panhypopituitarism:    -Continue with hydrocortisone(at PTA dose, as he has had a quick improvement, do not see the need for stress dose) and Synthroid.     Dysphagia:  -G-tube in place  -Dietary consult for resumption of tube feed      Diet:NPO, Tube feeding  DVT Prophylaxis: Pneumatic Compression Devices   Lerma Catheter: not present  Code Status: Full Code     Disposition Plan    Expected discharge: 3/16, pending decision  about tracheostomy and ongoing clinical improvement  Entered: Migel Stoddard MD 03/15/2019, 2:29 PM        The patient's care was discussed with the bedside nurse  Migel Stoddard MD  Hospitalist Service  [facility]    ______________________________________________________________________    Interval History   Afebrile.  Not hypoxic.  Gestures by thumbs up, feels overall improving.    Data reviewed today: I reviewed all medications, new labs and imaging results over the last 24 hours. I personally reviewed no images or EKG's today.    Physical Exam   Vital signs:  Temp: 98.4  F (36.9  C) Temp src: Oral BP: 112/54 Pulse: 91 Heart Rate: 84 Resp: 16 SpO2: 91 % O2 Device: None (Room air)   Height: 182.9 cm (6') Weight: 71.5 kg (157 lb 10.1 oz)  Estimated body mass index is 21.38 kg/m  as calculated from the following:    Height as of this encounter: 1.829 m (6').    Weight as of this encounter: 71.5 kg (157 lb 10.1 oz).      Wt Readings from Last 2 Encounters:   03/15/19 71.5 kg (157 lb 10.1 oz)   03/11/19 77.6 kg (171 lb)       Gen: Awake, slightly more interactive today although still not back to his baseline.  Answers by gesturing thumbs up or down  HEENT:  no pallor  Resp: Few crackles on the right base, normal effort of breathing   CVS: RRR, no murmur  Abd/GI: Soft, non-tender.  PEG in place  Skin: Warm, dry no rashes  MSK: no pedal edema  Neuro-chronic right-sided hemiparesis      Data   Recent Labs   Lab 03/15/19  0915 03/14/19 0215 03/11/19  0724  03/09/19 2000   WBC 6.9 24.1* 7.5   < > 21.5*   HGB 9.9* 12.7* 10.5*   < > 13.9   MCV 82 81 82   < > 82   * 182 117*   < > 166    138 139   < > 135   POTASSIUM 3.5 3.7 3.9   < > 3.9   CHLORIDE 110* 103 107   < > 97   CO2 24 27 28   < > 32   BUN 9 15 10   < > 19   CR 0.96 0.85 0.72   < > 0.82   ANIONGAP 9 8 4   < > 6   STEVE 8.2* 8.7 8.0*   < > 8.9   GLC 93 126* 90   < > 129*   ALBUMIN  --  3.0*  --   --  3.5   PROTTOTAL  --  8.0  --    --  8.7   BILITOTAL  --  0.4  --   --  0.2   ALKPHOS  --  84  --   --  103   ALT  --  38  --   --  38   AST  --  24  --   --  23    < > = values in this interval not displayed.       No results found for this or any previous visit (from the past 24 hour(s)).  Medications     IV fluid REPLACEMENT ONLY       sodium chloride 50 mL/hr at 03/15/19 0414       acetylcysteine  2 mL Nebulization 4x Daily     albuterol  2.5 mg Nebulization Q4H While awake     bacitracin   Topical Daily     Brivaracetam  100 mg Per G Tube BID     carBAMazepine  150 mg Per G Tube Q6H     fiber modular (NUTRISOURCE FIBER)  1 packet Per G Tube Daily     hydrocortisone  10 mg Per G Tube Daily with supper     hydrocortisone  20 mg Per G Tube QAM     levothyroxine  137 mcg Per G Tube Daily     melatonin  6 mg Per G Tube At Bedtime     pantoprazole  20 mg Per NG tube BID     piperacillin-tazobactam  4.5 g Intravenous Q6H     potassium & sodium phosphates  1 packet Oral TID     saccharomyces boulardii  250 mg Oral or Feeding Tube Daily

## 2019-03-15 NOTE — PLAN OF CARE
HECTOR Orientation, calm and cooperative, pleasant.  VSS, on RA.  Denies pain, appears comfortable.  Total care, T/R q2 hrs, Inc of bowel and bladder.  G/J tube clamped.  NS at 50/hr.  LS course, infreq congested cough.  Carlo nebs, IV Zosyn.  WBC 24.1.  ENT consulted for possible trach.  Contact iso maintained. Coccyx red, blanchable.  Discharge pending, nursing will continue to monitor.

## 2019-03-15 NOTE — PROGRESS NOTES
"CLINICAL NUTRITION SERVICES  -  ASSESSMENT NOTE      Recommendations ordered by dietitian:   Isosource 1.5 at 100 mL/hr x 12 hours (7 am - 7 pm) to provide 1800 kcal (25 kcal/kg), 82g protein (1.1 gl/kg), 211g CHO, 18g fiber and 912mL water.   Std H2O flushes of 60 mL q 4 hour while on IVF, then 150 mL q 4 hours..     Malnutrition:   % Weight Loss:  None noted  % Intake:  No decreased intake noted  Subcutaneous Fat Loss:  None observed  Muscle Loss:  None observed  Fluid Retention:  None noted    Malnutrition Diagnosis: Patient does not meet two of the above criteria necessary for diagnosing malnutrition          REASON FOR ASSESSMENT  Keyon Farias is a 58 year old male seen by Registered Dietitian for - Provider order, assess and order TF.    NUTRITION HISTORY  - Information obtained from EMR. Pt was just discharged from here 48 hours before readmit.  On 3/10 the following hx was documented by our service -  Pt is very well known to our service. He has been on TF via GJ tube since 8/2016; he just had a new tube placed on 3/8 - 18Fr TORY GJ tube.  Per previous records pt's home TF regimen is as follows - Jevity 1.5 (5 to 5.5 cans daily) x 12 hours during the day to provide 2685-4749 kcal, 77-83g protein, ~260g CHO, ~27g fiber and ~900mL free water. H2O flushes 120 mL QID.  Pt lives with his mother, she likes to keep the TF rate at no more than 100 ml/hr. He is fed during the day rather than at night so that he can be up in the chair to prevent aspiration.   His home meds include Vit D, Benefiber, Florastor, Neutraphos, and Centrum Silver vitamins.      CURRENT NUTRITION ORDERS  Diet Order:     NPO     NUTRITION FOCUSED PHYSICAL ASSESSMENT (NFPA)  Completed:        Yes:   Visual assessment onl         Observed  No nutrition-related physical findings observed  Obtained from Chart/Interdisciplinary Team  None noted    ANTHROPOMETRICS  Height: 6' 0\"  Weight: 157 lbs 10.06 oz (71.5 kg) - 3/15  Body mass index is 21.38 " kg/m .  Weight Status:  Normal BMI  IBW: 81 kg +/- 10%  % IBW: 88%  Weight History:  Inconsistent weights but it appears his usual wt is ~157# so it is stable.   Wt Readings from Last 10 Encounters:   03/15/19 71.5 kg (157 lb 10.1 oz)   03/11/19 77.6 kg (171 lb)   03/04/19 72.6 kg (160 lb)   02/22/19 74.4 kg (164 lb 0.4 oz)   02/01/19 71.5 kg (157 lb 11.2 oz)   01/16/19 71.6 kg (157 lb 13.6 oz)   12/24/18 62 kg (136 lb 11 oz)   11/25/18 71.7 kg (158 lb 1.1 oz)   11/04/18 71.5 kg (157 lb 10.1 oz)   09/22/18 70.3 kg (155 lb)       LABS  Labs reviewed    MEDICATIONS  NS @ 50 mL/hr  As at home: Nutrisource Fiber, Neutraphos, Florastor      ASSESSED NUTRITION NEEDS PER APPROVED PRACTICE GUIDELINES:  Dosing Weight 71.5 kg - 3/15  Estimated Energy Needs: 5437-0822 kcals (25-30 Kcal/Kg)  Justification: maintenance  Estimated Protein Needs: 70-90 grams protein (1-1.3 g pro/Kg)  Justification: preservation of lean body mass  Estimated Fluid Needs: 1 mL/Kcal  Justification: maintenance    MALNUTRITION:  % Weight Loss:  None noted  % Intake:  No decreased intake noted  Subcutaneous Fat Loss:  None observed  Muscle Loss:  None observed  Fluid Retention:  None noted    Malnutrition Diagnosis: Patient does not meet two of the above criteria necessary for diagnosing malnutrition      NUTRITION DIAGNOSIS:  No nutrition diagnosis identified at this time       NUTRITION INTERVENTIONS  Recommendations / Nutrition Prescription  Pt's usual daytime feeding -  Isosource 1.5 at 100 mL/hr x 12 hours (7 am - 7 pm) to provide 1800 kcal (25 kcal/kg), 82g protein (1.1 gl/kg), 211g CHO, 18g fiber and 912mL water.   Std H2O flushes of 60 mL q 4 hour while on IVF, then 150 mL q 4 hours..    .Implementation  Nutrition education: No education needs at this time  Ordered above TF    Nutrition Goals  EN will meet assessed needs      MONITORING AND EVALUATION:  Progress towards goals will be monitored and evaluated per protocol and Practice  Guidelines      Unique Schultz, YONIS  Pager [phone] (M-F)            [phone] (W/E & Hol)

## 2019-03-15 NOTE — PLAN OF CARE
Alert, unable to assess orientation, non verbal; calm and cooperative. VSS on RA. Bedrest, assist of 2 turn/repo q2hr. LS coarse. Incontinent of b/b. G tube, WDL. NPO; TF ordered. Thoracic surgery consulted for possible trach. No s/s of pain. Blanchable redness to coccyx.

## 2019-03-16 PROCEDURE — 12000000 ZZH R&B MED SURG/OB

## 2019-03-16 PROCEDURE — 99232 SBSQ HOSP IP/OBS MODERATE 35: CPT | Performed by: INTERNAL MEDICINE

## 2019-03-16 PROCEDURE — 25000128 H RX IP 250 OP 636: Performed by: INTERNAL MEDICINE

## 2019-03-16 PROCEDURE — 25000132 ZZH RX MED GY IP 250 OP 250 PS 637: Mod: GY | Performed by: INTERNAL MEDICINE

## 2019-03-16 PROCEDURE — 25000125 ZZHC RX 250: Performed by: INTERNAL MEDICINE

## 2019-03-16 PROCEDURE — 40000275 ZZH STATISTIC RCP TIME EA 10 MIN

## 2019-03-16 PROCEDURE — 27210429 ZZH NUTRITION PRODUCT INTERMEDIATE LITER

## 2019-03-16 PROCEDURE — 94640 AIRWAY INHALATION TREATMENT: CPT | Mod: 76

## 2019-03-16 PROCEDURE — 25800030 ZZH RX IP 258 OP 636: Performed by: INTERNAL MEDICINE

## 2019-03-16 PROCEDURE — A9270 NON-COVERED ITEM OR SERVICE: HCPCS | Mod: GY | Performed by: INTERNAL MEDICINE

## 2019-03-16 PROCEDURE — 94640 AIRWAY INHALATION TREATMENT: CPT

## 2019-03-16 RX ADMIN — ALBUTEROL SULFATE 2.5 MG: 2.5 SOLUTION RESPIRATORY (INHALATION) at 08:17

## 2019-03-16 RX ADMIN — POTASSIUM & SODIUM PHOSPHATES POWDER PACK 280-160-250 MG 1 PACKET: 280-160-250 PACK at 21:47

## 2019-03-16 RX ADMIN — Medication 250 MG: at 09:43

## 2019-03-16 RX ADMIN — PIPERACILLIN AND TAZOBACTAM 4.5 G: 4; .5 INJECTION, POWDER, FOR SOLUTION INTRAVENOUS at 21:46

## 2019-03-16 RX ADMIN — ALBUTEROL SULFATE 2.5 MG: 2.5 SOLUTION RESPIRATORY (INHALATION) at 19:40

## 2019-03-16 RX ADMIN — MELATONIN 6 MG: 3 TAB ORAL at 21:47

## 2019-03-16 RX ADMIN — LEVOTHYROXINE SODIUM 137 MCG: 112 TABLET ORAL at 09:43

## 2019-03-16 RX ADMIN — ACETYLCYSTEINE: 200 SOLUTION ORAL; RESPIRATORY (INHALATION) at 19:40

## 2019-03-16 RX ADMIN — HYDROCORTISONE 20 MG: 20 TABLET ORAL at 09:43

## 2019-03-16 RX ADMIN — CARBAMAZEPINE 150 MG: 100 SUSPENSION ORAL at 14:59

## 2019-03-16 RX ADMIN — ACETYLCYSTEINE 2 ML: 200 SOLUTION ORAL; RESPIRATORY (INHALATION) at 08:16

## 2019-03-16 RX ADMIN — SODIUM BICARBONATE 20 MG: 84 INJECTION, SOLUTION INTRAVENOUS at 21:47

## 2019-03-16 RX ADMIN — BACITRACIN: 500 OINTMENT TOPICAL at 08:40

## 2019-03-16 RX ADMIN — CARBAMAZEPINE 150 MG: 100 SUSPENSION ORAL at 02:35

## 2019-03-16 RX ADMIN — ACETYLCYSTEINE 2 ML: 200 SOLUTION ORAL; RESPIRATORY (INHALATION) at 15:12

## 2019-03-16 RX ADMIN — PIPERACILLIN AND TAZOBACTAM 4.5 G: 4; .5 INJECTION, POWDER, FOR SOLUTION INTRAVENOUS at 10:07

## 2019-03-16 RX ADMIN — HYDROCORTISONE 10 MG: 10 TABLET ORAL at 16:23

## 2019-03-16 RX ADMIN — POTASSIUM & SODIUM PHOSPHATES POWDER PACK 280-160-250 MG 1 PACKET: 280-160-250 PACK at 16:23

## 2019-03-16 RX ADMIN — Medication 1 PACKET: at 09:43

## 2019-03-16 RX ADMIN — PIPERACILLIN AND TAZOBACTAM 4.5 G: 4; .5 INJECTION, POWDER, FOR SOLUTION INTRAVENOUS at 03:43

## 2019-03-16 RX ADMIN — CARBAMAZEPINE 150 MG: 100 SUSPENSION ORAL at 09:43

## 2019-03-16 RX ADMIN — ALBUTEROL SULFATE 2.5 MG: 2.5 SOLUTION RESPIRATORY (INHALATION) at 15:12

## 2019-03-16 RX ADMIN — POTASSIUM & SODIUM PHOSPHATES POWDER PACK 280-160-250 MG 1 PACKET: 280-160-250 PACK at 09:42

## 2019-03-16 RX ADMIN — ACETYLCYSTEINE 2 ML: 200 SOLUTION ORAL; RESPIRATORY (INHALATION) at 11:29

## 2019-03-16 RX ADMIN — ALBUTEROL SULFATE 2.5 MG: 2.5 SOLUTION RESPIRATORY (INHALATION) at 11:30

## 2019-03-16 RX ADMIN — PIPERACILLIN AND TAZOBACTAM 4.5 G: 4; .5 INJECTION, POWDER, FOR SOLUTION INTRAVENOUS at 16:23

## 2019-03-16 RX ADMIN — BRIVARACETAM 100 MG: 10 SOLUTION ORAL at 21:46

## 2019-03-16 RX ADMIN — SODIUM BICARBONATE 20 MG: 84 INJECTION, SOLUTION INTRAVENOUS at 09:43

## 2019-03-16 RX ADMIN — SODIUM CHLORIDE: 9 INJECTION, SOLUTION INTRAVENOUS at 03:43

## 2019-03-16 RX ADMIN — BRIVARACETAM 100 MG: 10 SOLUTION ORAL at 09:45

## 2019-03-16 RX ADMIN — CARBAMAZEPINE 150 MG: 100 SUSPENSION ORAL at 21:46

## 2019-03-16 ASSESSMENT — ACTIVITIES OF DAILY LIVING (ADL)
ADLS_ACUITY_SCORE: 39

## 2019-03-16 NOTE — PLAN OF CARE
Pt is alert but HECTOR orientation. Nonverbal but follow command, calm, and cooperative. VSS on RA with LS coarse at bases. Infrequent productive cough, swallows phlegm. Denies pain or SOB. Total care with T/R Q2H.  NPO. G-tube feeding site CDI, patent, and Clamped. Incont of B/B, no BM this shift. Redness blanchabe to coccyx.  PIV infusing  NS at 50 ml/hr. K 3.5 and HGB 9.9. Thoracic surgery following. Will continue to monitor.

## 2019-03-16 NOTE — PROGRESS NOTES
THORACIC SURGERY    Reviewed, discussed with Dr. Jerad Cuevas NPO  Feeding fei CASTILLO lumen of GJ tube, tube in good position, was replaced 03-08    I don't think a tracheostomy will completely prevent the risk of aspiration and has its own risks and disadvantage.  Cuff will need to be inflated and will interfere with phonation    CINDY BOLES MD [facility] ONCOLOGY THORACIC SURGERY  CELL:  [phone]  OFFICE: [phone]

## 2019-03-16 NOTE — PROGRESS NOTES
[facility]    Medicine Progress Note - Hospitalist Service        Date of Admission:  3/14/2019  1:38 AM    Assessment & Plan:   Mr. Farias is a 58-year-old male with a past medical history significant for recurrent aspiration pneumonia, traumatic brain injury with hemiplegia and dysphagia, as well as seizure disorder, who presents to the Emergency Department with worsening shortness of breath and difficulty managing his secretions with recurrent or worsening right lower lobe aspiration pneumonia.      Recurrent aspiration pneumonia:    -He was just discharged 48 hours prior to current presentation with similar suspected aspiration pneumonia  -Readmitted with worsening dyspnea and increasing issues with secretions  -Low-grade fever up to 100  F  -White cell count jumped up to 24,000 from 7.53 days ago, most likely stress reaction now back to normal.  -Chest x-ray shows right basilar infiltrate slightly worse than previous exam  -Continue treatment for recurrent aspiration pneumonia with Zosyn  -Discussed with the patient's mother, she is also obviously discouraged by the recent recurrent admissions for aspirations.  He has had 5 admissions since January for the same problem.  -Suggested ENT evaluation for recurrent aspiration evaluation, ? Trach.  Apparently trach will not be done by ENT, apparently only done by Dr. Ortiz at [facility].  Consult placed.  Cardiothoracic surgery evaluated the patient discussed with Dr. Ortiz as well today, he does not recommend tracheostomy and at this time.  I agree with that.  Continue the G-tube feeding.  May need to discuss with the family our day during the right at home.  And using the J port and not the G port on feeding at home.  Keep the patient up at least 40 degrees all the time strict n.p.o. by mouth.    Overall improved at this time.    Seizure disorder:    -Continue PTA brivaracetam and Tegretol.     Gastroesophageal reflux disease:    -Continue with PPI.      Hypothyroidism:    -Continue with levothyroxine.     Panhypopituitarism:    -Continue with hydrocortisone(at PTA dose, as he has had a quick improvement, do not see the need for stress dose) and Synthroid.     Dysphagia:  -GJ tube in place  -Dietary consult for resumption of tube feed      Diet:NPO, Tube feeding  DVT Prophylaxis: Pneumatic Compression Devices   Lerma Catheter: not present  Code Status: Full Code     Disposition Plan    Expected discharge: 3/16, pending decision about tracheostomy and ongoing clinical improvement  Entered: Kane Fisher MD 03/16/2019, 1:24 PM        The patient's care was discussed with the bedside nurse  Kane fisher MD  Hospitalist Service  [facility]    ______________________________________________________________________    Interval History   Afebrile.  Not hypoxic.  Gestures by thumbs up, feels overall improving.    Data reviewed today: I reviewed all medications, new labs and imaging results over the last 24 hours. I personally reviewed no images or EKG's today.    Physical Exam   Vital signs:  Temp: 98  F (36.7  C) Temp src: Oral BP: 126/69   Heart Rate: 55 Resp: 16 SpO2: 94 % O2 Device: None (Room air)   Height: 182.9 cm (6') Weight: 71.5 kg (157 lb 10.1 oz)  Estimated body mass index is 21.38 kg/m  as calculated from the following:    Height as of this encounter: 1.829 m (6').    Weight as of this encounter: 71.5 kg (157 lb 10.1 oz).      Wt Readings from Last 2 Encounters:   03/15/19 71.5 kg (157 lb 10.1 oz)   03/11/19 77.6 kg (171 lb)       Gen: Awake, alert and following command.  HEENT:  no pallor  Resp: CTA, diminished air entry bilaterally  CVS: RRR, no murmur  Abd/GI: Soft, non-tender.  PEG in place  Skin: Warm, dry no rashes  MSK: no pedal edema  Neuro-chronic right-sided hemiparesis      Data   Recent Labs   Lab 03/15/19  0915 03/14/19  0215 03/11/19  0724  03/09/19 2000   WBC 6.9 24.1* 7.5   < > 21.5*   HGB 9.9* 12.7* 10.5*   < > 13.9   MCV 82  81 82   < > 82   * 182 117*   < > 166    138 139   < > 135   POTASSIUM 3.5 3.7 3.9   < > 3.9   CHLORIDE 110* 103 107   < > 97   CO2 24 27 28   < > 32   BUN 9 15 10   < > 19   CR 0.96 0.85 0.72   < > 0.82   ANIONGAP 9 8 4   < > 6   STEVE 8.2* 8.7 8.0*   < > 8.9   GLC 93 126* 90   < > 129*   ALBUMIN  --  3.0*  --   --  3.5   PROTTOTAL  --  8.0  --   --  8.7   BILITOTAL  --  0.4  --   --  0.2   ALKPHOS  --  84  --   --  103   ALT  --  38  --   --  38   AST  --  24  --   --  23    < > = values in this interval not displayed.       No results found for this or any previous visit (from the past 24 hour(s)).  Medications     IV fluid REPLACEMENT ONLY       sodium chloride 50 mL/hr at 03/16/19 0343       acetylcysteine  2 mL Nebulization 4x Daily     albuterol  2.5 mg Nebulization Q4H While awake     bacitracin   Topical Daily     Brivaracetam  100 mg Per G Tube BID     carBAMazepine  150 mg Per G Tube Q6H     fiber modular (NUTRISOURCE FIBER)  1 packet Per G Tube Daily     hydrocortisone  10 mg Per G Tube Daily with supper     hydrocortisone  20 mg Per G Tube QAM     levothyroxine  137 mcg Per G Tube Daily     melatonin  6 mg Per G Tube At Bedtime     pantoprazole  20 mg Per NG tube BID     piperacillin-tazobactam  4.5 g Intravenous Q6H     potassium & sodium phosphates  1 packet Oral TID     saccharomyces boulardii  250 mg Oral or Feeding Tube Daily

## 2019-03-16 NOTE — PLAN OF CARE
Pt is alert .Non verbal but follow command.Infrequent productive cough.NPO .Total care with Q 2 HRS reposition.IVF running 50 ml/hr.Thoracic surgery following .Will monitor.

## 2019-03-16 NOTE — PLAN OF CARE
Alert, unable to assess orientation, non verbal; calm and cooperative. VSS on RA. Bedrest, assist of 2 turn/repo q2hr. LS coarse, infrequent productive cough, encouraged to expell sputum but swallowed by pt. Incontinent of b/b. Seen by thoracic surgery consulted for possible trach; not recommended at this time. GJ tube, WDL. Tube feedings running, 7a-7p to J tube. Strict NPO, all feeding and meds to J tube, HOB 40 degrees+. No s/s of pain. Blanchable redness to coccyx.

## 2019-03-17 LAB
ALBUMIN SERPL-MCNC: 2.6 G/DL (ref 3.4–5)
ANION GAP SERPL CALCULATED.3IONS-SCNC: 5 MMOL/L (ref 3–14)
BASOPHILS # BLD AUTO: 0.1 10E9/L (ref 0–0.2)
BASOPHILS NFR BLD AUTO: 1 %
BUN SERPL-MCNC: 9 MG/DL (ref 7–30)
CALCIUM SERPL-MCNC: 8.3 MG/DL (ref 8.5–10.1)
CHLORIDE SERPL-SCNC: 107 MMOL/L (ref 94–109)
CO2 SERPL-SCNC: 28 MMOL/L (ref 20–32)
CREAT SERPL-MCNC: 0.98 MG/DL (ref 0.66–1.25)
DIFFERENTIAL METHOD BLD: ABNORMAL
EOSINOPHIL # BLD AUTO: 0.6 10E9/L (ref 0–0.7)
EOSINOPHIL NFR BLD AUTO: 9.4 %
ERYTHROCYTE [DISTWIDTH] IN BLOOD BY AUTOMATED COUNT: 14.9 % (ref 10–15)
GFR SERPL CREATININE-BSD FRML MDRD: 85 ML/MIN/{1.73_M2}
GLUCOSE SERPL-MCNC: 125 MG/DL (ref 70–99)
HCT VFR BLD AUTO: 34.3 % (ref 40–53)
HGB BLD-MCNC: 11 G/DL (ref 13.3–17.7)
IMM GRANULOCYTES # BLD: 0 10E9/L (ref 0–0.4)
IMM GRANULOCYTES NFR BLD: 0.3 %
LYMPHOCYTES # BLD AUTO: 2.7 10E9/L (ref 0.8–5.3)
LYMPHOCYTES NFR BLD AUTO: 44.9 %
MCH RBC QN AUTO: 26.6 PG (ref 26.5–33)
MCHC RBC AUTO-ENTMCNC: 32.1 G/DL (ref 31.5–36.5)
MCV RBC AUTO: 83 FL (ref 78–100)
MONOCYTES # BLD AUTO: 0.6 10E9/L (ref 0–1.3)
MONOCYTES NFR BLD AUTO: 10.2 %
NEUTROPHILS # BLD AUTO: 2.1 10E9/L (ref 1.6–8.3)
NEUTROPHILS NFR BLD AUTO: 34.2 %
PHOSPHATE SERPL-MCNC: 2.7 MG/DL (ref 2.5–4.5)
PLATELET # BLD AUTO: 176 10E9/L (ref 150–450)
POTASSIUM SERPL-SCNC: 3.5 MMOL/L (ref 3.4–5.3)
RBC # BLD AUTO: 4.14 10E12/L (ref 4.4–5.9)
SODIUM SERPL-SCNC: 140 MMOL/L (ref 133–144)
WBC # BLD AUTO: 6.1 10E9/L (ref 4–11)

## 2019-03-17 PROCEDURE — 25800030 ZZH RX IP 258 OP 636: Performed by: INTERNAL MEDICINE

## 2019-03-17 PROCEDURE — 85025 COMPLETE CBC W/AUTO DIFF WBC: CPT | Performed by: INTERNAL MEDICINE

## 2019-03-17 PROCEDURE — 25000132 ZZH RX MED GY IP 250 OP 250 PS 637: Mod: GY | Performed by: INTERNAL MEDICINE

## 2019-03-17 PROCEDURE — 25000125 ZZHC RX 250: Performed by: INTERNAL MEDICINE

## 2019-03-17 PROCEDURE — 94640 AIRWAY INHALATION TREATMENT: CPT

## 2019-03-17 PROCEDURE — 12000000 ZZH R&B MED SURG/OB

## 2019-03-17 PROCEDURE — A9270 NON-COVERED ITEM OR SERVICE: HCPCS | Mod: GY | Performed by: INTERNAL MEDICINE

## 2019-03-17 PROCEDURE — 94640 AIRWAY INHALATION TREATMENT: CPT | Mod: 76

## 2019-03-17 PROCEDURE — 80069 RENAL FUNCTION PANEL: CPT | Performed by: INTERNAL MEDICINE

## 2019-03-17 PROCEDURE — 25000128 H RX IP 250 OP 636: Performed by: INTERNAL MEDICINE

## 2019-03-17 PROCEDURE — 99232 SBSQ HOSP IP/OBS MODERATE 35: CPT | Performed by: INTERNAL MEDICINE

## 2019-03-17 PROCEDURE — 40000275 ZZH STATISTIC RCP TIME EA 10 MIN

## 2019-03-17 PROCEDURE — 36415 COLL VENOUS BLD VENIPUNCTURE: CPT | Performed by: INTERNAL MEDICINE

## 2019-03-17 RX ADMIN — SODIUM BICARBONATE 20 MG: 84 INJECTION, SOLUTION INTRAVENOUS at 09:55

## 2019-03-17 RX ADMIN — MELATONIN 6 MG: 3 TAB ORAL at 21:28

## 2019-03-17 RX ADMIN — ALBUTEROL SULFATE 2.5 MG: 2.5 SOLUTION RESPIRATORY (INHALATION) at 12:00

## 2019-03-17 RX ADMIN — HYDROCORTISONE 10 MG: 10 TABLET ORAL at 16:34

## 2019-03-17 RX ADMIN — PIPERACILLIN AND TAZOBACTAM 4.5 G: 4; .5 INJECTION, POWDER, FOR SOLUTION INTRAVENOUS at 16:34

## 2019-03-17 RX ADMIN — ALBUTEROL SULFATE 2.5 MG: 2.5 SOLUTION RESPIRATORY (INHALATION) at 16:17

## 2019-03-17 RX ADMIN — HYDROCORTISONE 20 MG: 20 TABLET ORAL at 09:56

## 2019-03-17 RX ADMIN — POTASSIUM & SODIUM PHOSPHATES POWDER PACK 280-160-250 MG 1 PACKET: 280-160-250 PACK at 16:34

## 2019-03-17 RX ADMIN — CARBAMAZEPINE 150 MG: 100 SUSPENSION ORAL at 14:36

## 2019-03-17 RX ADMIN — SODIUM CHLORIDE: 9 INJECTION, SOLUTION INTRAVENOUS at 08:06

## 2019-03-17 RX ADMIN — CARBAMAZEPINE 150 MG: 100 SUSPENSION ORAL at 21:29

## 2019-03-17 RX ADMIN — PIPERACILLIN AND TAZOBACTAM 4.5 G: 4; .5 INJECTION, POWDER, FOR SOLUTION INTRAVENOUS at 03:10

## 2019-03-17 RX ADMIN — ALBUTEROL SULFATE 2.5 MG: 2.5 SOLUTION RESPIRATORY (INHALATION) at 20:11

## 2019-03-17 RX ADMIN — Medication 1 PACKET: at 14:36

## 2019-03-17 RX ADMIN — BRIVARACETAM 100 MG: 10 SOLUTION ORAL at 21:28

## 2019-03-17 RX ADMIN — PIPERACILLIN AND TAZOBACTAM 4.5 G: 4; .5 INJECTION, POWDER, FOR SOLUTION INTRAVENOUS at 21:29

## 2019-03-17 RX ADMIN — POTASSIUM & SODIUM PHOSPHATES POWDER PACK 280-160-250 MG 1 PACKET: 280-160-250 PACK at 21:28

## 2019-03-17 RX ADMIN — ACETYLCYSTEINE 2 ML: 200 SOLUTION ORAL; RESPIRATORY (INHALATION) at 16:17

## 2019-03-17 RX ADMIN — ACETYLCYSTEINE: 200 SOLUTION ORAL; RESPIRATORY (INHALATION) at 20:11

## 2019-03-17 RX ADMIN — Medication 250 MG: at 09:56

## 2019-03-17 RX ADMIN — SODIUM BICARBONATE 20 MG: 84 INJECTION, SOLUTION INTRAVENOUS at 21:28

## 2019-03-17 RX ADMIN — PIPERACILLIN AND TAZOBACTAM 4.5 G: 4; .5 INJECTION, POWDER, FOR SOLUTION INTRAVENOUS at 09:53

## 2019-03-17 RX ADMIN — CARBAMAZEPINE 150 MG: 100 SUSPENSION ORAL at 03:10

## 2019-03-17 RX ADMIN — CARBAMAZEPINE 150 MG: 100 SUSPENSION ORAL at 09:55

## 2019-03-17 RX ADMIN — ACETYLCYSTEINE 4 ML: 200 SOLUTION ORAL; RESPIRATORY (INHALATION) at 08:30

## 2019-03-17 RX ADMIN — ALBUTEROL SULFATE 2.5 MG: 2.5 SOLUTION RESPIRATORY (INHALATION) at 08:29

## 2019-03-17 RX ADMIN — BRIVARACETAM 100 MG: 10 SOLUTION ORAL at 09:55

## 2019-03-17 RX ADMIN — BACITRACIN: 500 OINTMENT TOPICAL at 09:54

## 2019-03-17 RX ADMIN — POTASSIUM & SODIUM PHOSPHATES POWDER PACK 280-160-250 MG 1 PACKET: 280-160-250 PACK at 09:56

## 2019-03-17 RX ADMIN — ACETYLCYSTEINE 4 ML: 200 SOLUTION ORAL; RESPIRATORY (INHALATION) at 12:01

## 2019-03-17 RX ADMIN — LEVOTHYROXINE SODIUM 137 MCG: 112 TABLET ORAL at 09:56

## 2019-03-17 ASSESSMENT — ACTIVITIES OF DAILY LIVING (ADL)
ADLS_ACUITY_SCORE: 39

## 2019-03-17 ASSESSMENT — MIFFLIN-ST. JEOR: SCORE: 1566

## 2019-03-17 NOTE — PROGRESS NOTES
[facility]    Medicine Progress Note - Hospitalist Service        Date of Admission:  3/14/2019  1:38 AM    Assessment & Plan:   Mr. Farias is a 58-year-old male with a past medical history significant for recurrent aspiration pneumonia, traumatic brain injury with hemiplegia and dysphagia, as well as seizure disorder, who presents to the Emergency Department with worsening shortness of breath and difficulty managing his secretions with recurrent or worsening right lower lobe aspiration pneumonia.      Recurrent aspiration pneumonia:    -He was just discharged 48 hours prior to current presentation with similar suspected aspiration pneumonia  -Readmitted with worsening dyspnea and increasing issues with secretions  -Low-grade fever up to 100  F  -White cell count jumped up to 24,000 from 7.53 days ago, most likely stress reaction now back to normal.  -Chest x-ray shows right basilar infiltrate slightly worse than previous exam  -Continue treatment for recurrent aspiration pneumonia with Zosyn  -Discussed with the patient's mother, she is also obviously discouraged by the recent recurrent admissions for aspirations.  He has had 5 admissions since January for the same problem.  -Suggested ENT evaluation for recurrent aspiration evaluation, ? Trach.  Apparently trach will not be done by ENT, apparently only done by Dr. Ortiz at [facility].  Consult placed.  Cardiothoracic surgery evaluated the patient discussed with Dr. Ortiz, he does not recommend tracheostomy and at this time.  I agree with that.  Continue the GJ-tube feeding.  May need to discuss with the family our day during the right at home.  And using the J port and not the G port on feeding at home.  Keep the patient up at least 40 degrees all the time strict n.p.o. by mouth.  Discussed with patient and mother on 3/16/2019, she is using G4 for medications.  Advised that the use report both for feeding as well as for medication at this time.  To  avoid any risk of aspiration.  And keep him n.p.o. by mouth.    Overall improved at this time.    Seizure disorder:    -Continue PTA brivaracetam and Tegretol.     Gastroesophageal reflux disease:    -Continue with PPI.     Hypothyroidism:    -Continue with levothyroxine.     Panhypopituitarism:    -Continue with hydrocortisone(at PTA dose, as he has had a quick improvement, do not see the need for stress dose) and Synthroid.     Dysphagia:  -GJ tube in place  -Dietary consult for resumption of tube feed      I will continue with IV antibiotics today.  Repeat chest x-ray in the morning.  The chest x-ray improving continue to get better.  We will switch the antibiotics to oral possible discharge home in the morning.    Diet:NPO, Tube feeding  DVT Prophylaxis: Pneumatic Compression Devices   Lerma Catheter: not present  Code Status: Full Code     Disposition Plan    Expected discharge: Tomorrow  Entered: Kane Fisher MD 03/17/2019, 12:23 PM        The patient's care was discussed with the bedside nurse  Kane fisher MD  Hospitalist Service  [facility]    ______________________________________________________________________    Interval History   Patient laying on comfortably, offers no complaints.  Tolerating tube feeding well.  No hypoxia or shortness of breath at this time.    No other significant events overnight    Data reviewed today: I reviewed all medications, new labs and imaging results over the last 24 hours. I personally reviewed no images or EKG's today.    Physical Exam   Vital signs:  Temp: 97.2  F (36.2  C) Temp src: Axillary BP: 101/62 Pulse: 53 Heart Rate: 77 Resp: 16 SpO2: 95 % O2 Device: None (Room air)   Height: 182.9 cm (6') Weight: 69.8 kg (153 lb 14.1 oz)  Estimated body mass index is 20.87 kg/m  as calculated from the following:    Height as of this encounter: 1.829 m (6').    Weight as of this encounter: 69.8 kg (153 lb 14.1 oz).      Wt Readings from Last 2 Encounters:    03/17/19 69.8 kg (153 lb 14.1 oz)   03/11/19 77.6 kg (171 lb)       Gen: Awake, alert and following command.  HEENT:  no pallor  Resp: CTA, diminished air entry bilaterally  CVS: RRR, no murmur  Abd/GI: Soft, non-tender.  PEG in place  Skin: Warm, dry no rashes  MSK: no pedal edema  Neuro-chronic right-sided hemiparesis      Data   Recent Labs   Lab 03/17/19  0834 03/15/19  0915 03/14/19  0215   WBC 6.1 6.9 24.1*   HGB 11.0* 9.9* 12.7*   MCV 83 82 81    138* 182    143 138   POTASSIUM 3.5 3.5 3.7   CHLORIDE 107 110* 103   CO2 28 24 27   BUN 9 9 15   CR 0.98 0.96 0.85   ANIONGAP 5 9 8   STEVE 8.3* 8.2* 8.7   * 93 126*   ALBUMIN 2.6*  --  3.0*   PROTTOTAL  --   --  8.0   BILITOTAL  --   --  0.4   ALKPHOS  --   --  84   ALT  --   --  38   AST  --   --  24       No results found for this or any previous visit (from the past 24 hour(s)).  Medications     IV fluid REPLACEMENT ONLY       sodium chloride 50 mL/hr at 03/17/19 0806       acetylcysteine  2 mL Nebulization 4x Daily     albuterol  2.5 mg Nebulization Q4H While awake     bacitracin   Topical Daily     Brivaracetam  100 mg Per G Tube BID     carBAMazepine  150 mg Per G Tube Q6H     fiber modular (NUTRISOURCE FIBER)  1 packet Per G Tube Daily     hydrocortisone  10 mg Per G Tube Daily with supper     hydrocortisone  20 mg Per G Tube QAM     levothyroxine  137 mcg Per G Tube Daily     melatonin  6 mg Per G Tube At Bedtime     pantoprazole  20 mg Per NG tube BID     piperacillin-tazobactam  4.5 g Intravenous Q6H     potassium & sodium phosphates  1 packet Oral TID     saccharomyces boulardii  250 mg Oral or Feeding Tube Daily

## 2019-03-17 NOTE — PLAN OF CARE
Pt alert, non verbal, unable to assess orientation, calm and cooperative. VSS on RA. Turn/repo q2hrs, 2 assist/lift. No s/s of pain. LS dimished, frequent productive cough. Incontinent of B&B. Blanchable redness to coccyx, mepilex applied; redness on groin, barrier cream applied. Tube feedings 7a-7p to J tube with flushes q4hrs. Using J-tube only for meds and tube feeds, NPO, HOB >40 degrees. BLE and RUE contractures.

## 2019-03-17 NOTE — PLAN OF CARE
Pt A&O but nonverbal, can respond to questions with thumbs up or head-shake 'no'. Unable to complete admission with y/n answers. Pt has a hx of TBI & R side hemiplegia, contractures on BLEs and URE. Denies pain. Tube feed through J-tube 12 hours on (7am-7pm) with Q4 water flushes. Blanchable redness in groin creases, incont of B&B. Can be combative if he does not like what is being done to him (makes a fist and waves it at staff). Turn & repo q2h. Thoracic surgery following.

## 2019-03-17 NOTE — PLAN OF CARE
Alert but HECTOR orientation. Nonverbal but follow command. Was cooperative most of the shift except during pericare, he was hitting and screeming. VSS on RA with LS coarse and diminised. Frequent productive cough. Denies pain or SOB. Total care with T/R Q2H.  NPO. Tube feeding running 100 ml/hr via J-tube. Site CDI and patent. Incont of B/B. 2x extra large loose stool. Redness blanchabe to coccyx. NS at 50 ml/hr. IV abx. Thoracic surgery following. Will continue to monitor.

## 2019-03-17 NOTE — PLAN OF CARE
Alert, unable to assess orientation, non verbal; calm and cooperative. VSS on RA. Bedrest, assist of 2 turn/repo q2hr. No s/s of pain. LS dimished, frequent productive cough, encouraged to expell sputum but swallowed by pt. Incontinent of b/b. Blanchable redness to coccyx/groin, barrier cream applied. GJ tube, WDL. Tube feedings running, 7a-7p to J tube. Strict NPO, all feeding and meds to J tube, HOB 40 degrees+. Repeat CXR tomorrow.

## 2019-03-18 ENCOUNTER — APPOINTMENT (OUTPATIENT)
Dept: GENERAL RADIOLOGY | Facility: CLINIC | Age: 59
DRG: 871 | End: 2019-03-18
Attending: INTERNAL MEDICINE
Payer: MEDICARE

## 2019-03-18 VITALS
DIASTOLIC BLOOD PRESSURE: 53 MMHG | SYSTOLIC BLOOD PRESSURE: 109 MMHG | HEART RATE: 77 BPM | RESPIRATION RATE: 18 BRPM | WEIGHT: 156.31 LBS | HEIGHT: 72 IN | TEMPERATURE: 98.1 F | BODY MASS INDEX: 21.17 KG/M2 | OXYGEN SATURATION: 93 %

## 2019-03-18 LAB
ANION GAP SERPL CALCULATED.3IONS-SCNC: NORMAL MMOL/L (ref 6–17)
BUN SERPL-MCNC: NORMAL MG/DL (ref 7–30)
CALCIUM SERPL-MCNC: NORMAL MG/DL (ref 8.5–10.1)
CHLORIDE SERPL-SCNC: NORMAL MMOL/L (ref 94–109)
CO2 SERPL-SCNC: NORMAL MMOL/L (ref 20–32)
CREAT SERPL-MCNC: NORMAL MG/DL (ref 0.66–1.25)
GFR SERPL CREATININE-BSD FRML MDRD: NORMAL ML/MIN/{1.73_M2}
GLUCOSE SERPL-MCNC: NORMAL MG/DL (ref 70–99)
MAGNESIUM SERPL-MCNC: NORMAL MG/DL (ref 1.6–2.3)
PHOSPHATE SERPL-MCNC: NORMAL MG/DL (ref 2.5–4.5)
POTASSIUM SERPL-SCNC: NORMAL MMOL/L (ref 3.4–5.3)
SODIUM SERPL-SCNC: NORMAL MMOL/L (ref 133–144)

## 2019-03-18 PROCEDURE — 40000275 ZZH STATISTIC RCP TIME EA 10 MIN

## 2019-03-18 PROCEDURE — 25000128 H RX IP 250 OP 636: Performed by: INTERNAL MEDICINE

## 2019-03-18 PROCEDURE — 25800030 ZZH RX IP 258 OP 636: Performed by: INTERNAL MEDICINE

## 2019-03-18 PROCEDURE — 94640 AIRWAY INHALATION TREATMENT: CPT

## 2019-03-18 PROCEDURE — 25000132 ZZH RX MED GY IP 250 OP 250 PS 637: Mod: GY | Performed by: INTERNAL MEDICINE

## 2019-03-18 PROCEDURE — 94640 AIRWAY INHALATION TREATMENT: CPT | Mod: 76

## 2019-03-18 PROCEDURE — 25000125 ZZHC RX 250: Performed by: INTERNAL MEDICINE

## 2019-03-18 PROCEDURE — 71045 X-RAY EXAM CHEST 1 VIEW: CPT

## 2019-03-18 PROCEDURE — 99239 HOSP IP/OBS DSCHRG MGMT >30: CPT | Performed by: INTERNAL MEDICINE

## 2019-03-18 PROCEDURE — A9270 NON-COVERED ITEM OR SERVICE: HCPCS | Mod: GY | Performed by: INTERNAL MEDICINE

## 2019-03-18 RX ADMIN — Medication 250 MG: at 09:22

## 2019-03-18 RX ADMIN — LEVOTHYROXINE SODIUM 137 MCG: 112 TABLET ORAL at 09:22

## 2019-03-18 RX ADMIN — ACETYLCYSTEINE 2 ML: 200 SOLUTION ORAL; RESPIRATORY (INHALATION) at 15:09

## 2019-03-18 RX ADMIN — POTASSIUM & SODIUM PHOSPHATES POWDER PACK 280-160-250 MG 1 PACKET: 280-160-250 PACK at 09:23

## 2019-03-18 RX ADMIN — ALBUTEROL SULFATE 2.5 MG: 2.5 SOLUTION RESPIRATORY (INHALATION) at 03:40

## 2019-03-18 RX ADMIN — ALBUTEROL SULFATE 2.5 MG: 2.5 SOLUTION RESPIRATORY (INHALATION) at 11:00

## 2019-03-18 RX ADMIN — ALBUTEROL SULFATE 2.5 MG: 2.5 SOLUTION RESPIRATORY (INHALATION) at 15:09

## 2019-03-18 RX ADMIN — HYDROCORTISONE 20 MG: 20 TABLET ORAL at 09:22

## 2019-03-18 RX ADMIN — ACETYLCYSTEINE 2 ML: 200 SOLUTION ORAL; RESPIRATORY (INHALATION) at 11:00

## 2019-03-18 RX ADMIN — SODIUM BICARBONATE 20 MG: 84 INJECTION, SOLUTION INTRAVENOUS at 09:23

## 2019-03-18 RX ADMIN — ALBUTEROL SULFATE 2.5 MG: 2.5 SOLUTION RESPIRATORY (INHALATION) at 07:13

## 2019-03-18 RX ADMIN — Medication 1 PACKET: at 09:21

## 2019-03-18 RX ADMIN — BACITRACIN: 500 OINTMENT TOPICAL at 09:20

## 2019-03-18 RX ADMIN — SODIUM CHLORIDE: 9 INJECTION, SOLUTION INTRAVENOUS at 07:42

## 2019-03-18 RX ADMIN — BRIVARACETAM 100 MG: 10 SOLUTION ORAL at 09:20

## 2019-03-18 RX ADMIN — ACETYLCYSTEINE 2 ML: 200 SOLUTION ORAL; RESPIRATORY (INHALATION) at 07:13

## 2019-03-18 RX ADMIN — CARBAMAZEPINE 150 MG: 100 SUSPENSION ORAL at 09:21

## 2019-03-18 RX ADMIN — CARBAMAZEPINE 150 MG: 100 SUSPENSION ORAL at 03:13

## 2019-03-18 RX ADMIN — PIPERACILLIN AND TAZOBACTAM 4.5 G: 4; .5 INJECTION, POWDER, FOR SOLUTION INTRAVENOUS at 09:23

## 2019-03-18 RX ADMIN — PIPERACILLIN AND TAZOBACTAM 4.5 G: 4; .5 INJECTION, POWDER, FOR SOLUTION INTRAVENOUS at 04:26

## 2019-03-18 ASSESSMENT — MIFFLIN-ST. JEOR: SCORE: 1577

## 2019-03-18 ASSESSMENT — ACTIVITIES OF DAILY LIVING (ADL)
ADLS_ACUITY_SCORE: 39

## 2019-03-18 NOTE — PLAN OF CARE
Pt resting comfortably this AM. Lungs clear, diminished, no coughing noted. VSS. Tolerating TF, G-tube site clean and dry. Coccyx reddened, dressing changed. Pt discharging to home today.

## 2019-03-18 NOTE — DISCHARGE SUMMARY
[facility]    Discharge Summary  Hospitalist    Date of Admission:  3/14/2019  Date of Discharge:  3/18/2019  Discharging Provider: Kane Mars MD  Date of Service (when I saw the patient): 03/18/19    Discharge Diagnoses   Recurrent aspiration pneumonia  Possible sepsis secondary to pneumonia    History of Present Illness   Keyon Farias is an 58 year old male who presented with worsening shortness of breath    Hospital Course       Mr. Farias is a 58-year-old male with a past medical history significant for recurrent aspiration pneumonia, traumatic brain injury with hemiplegia and dysphagia, as well as seizure disorder, who presents to the Emergency Department with worsening shortness of breath and difficulty managing his secretions with recurrent or worsening right lower lobe aspiration pneumonia.     Final discharge diagnoses and hospital course     Recurrent aspiration pneumonia:    Possible sepsis secondary to pneumonia  -He was just discharged 48 hours prior to current presentation with similar suspected aspiration pneumonia  -Readmitted with worsening dyspnea and increasing issues with secretions  -Low-grade fever up to 100  F  -White cell count jumped up to 24,000 from 7.53 days ago, most likely stress reaction now back to normal.  -Chest x-ray shows right basilar infiltrate slightly worse than previous exam  -Continue treatment for recurrent aspiration pneumonia with Zosyn  -Discussed with the patient's mother, she is also obviously discouraged by the recent recurrent admissions for aspirations.  He has had 5 admissions since January for the same problem.  -Suggested ENT evaluation for recurrent aspiration evaluation, ? Trach.  Apparently trach will not be done by ENT, apparently only done by Dr. Ortiz at [facility].  Consult placed.  Cardiothoracic surgery evaluated the patient discussed with Dr. Ortiz, he does not recommend tracheostomy and at this time.  I agree with that.  Continue the  GJ-tube feeding.    Discussed with the mother who is her primary caregiver only use the G port for the feeding and to not use that G port for any reason.  She was given medication through the G port recommend that she should use G port for the medications as well.  Keep him on at least 40 degrees all the time and strict n.p.o.      Overall improved at this time.  Switch IV Zosyn to oral Augmentin to complete the course.     Seizure disorder:    -Continue PTA brivaracetam and Tegretol.      Gastroesophageal reflux disease:    -Continue with PPI.      Hypothyroidism:    -Continue with levothyroxine.      Panhypopituitarism:    -Continue with hydrocortisone(at PTA dose, as he has had a quick improvement, do not see the need for stress dose) and Synthroid.      Dysphagia:  -GJ tube in place  Strict n.p.o.        She is doing well at this time, denies any chest pain, shortness of breath fever chills nausea vomiting abdominal pain back pain dysuria hematuria constipation diarrhea at this time.  I will switch her antibiotic to Augmentin through G J-tube to complete the course.  Strict oral n.p.o.  Do suction to clear his secretions at home frequently.  Keep his bed at least 40 degrees all the time.  Patient will be discharged home in a stable condition at this time.      Kane Mars MD    Significant Results and Procedures       Pending Results   These results will be followed up by his EP  Unresulted Labs Ordered in the Past 30 Days of this Admission     Date and Time Order Name Status Description    3/14/2019 0227 Blood culture Preliminary     3/14/2019 0227 Blood culture Preliminary           Code Status   Full Code       Primary Care Physician   Carlos Gomez    Physical Exam   Temp: 98.1  F (36.7  C) Temp src: Axillary BP: 109/53 Pulse: 77 Heart Rate: 72 Resp: 18 SpO2: 93 % O2 Device: None (Room air)    Vitals:    03/15/19 0650 03/17/19 0553 03/18/19 0602   Weight: 71.5 kg (157 lb 10.1 oz) 69.8 kg (153 lb 14.1 oz)  70.9 kg (156 lb 4.9 oz)     Vital Signs with Ranges  Temp:  [97.3  F (36.3  C)-98.2  F (36.8  C)] 98.1  F (36.7  C)  Pulse:  [77] 77  Heart Rate:  [53-81] 72  Resp:  [16-18] 18  BP: (109-130)/(53-69) 109/53  SpO2:  [93 %-99 %] 93 %  I/O last 3 completed shifts:  In: 2721 [I.V.:1317; NG/GT:406]  Out: -     Constitutional: awake, alert, cooperative, no apparent distress, and appears stated age  Eyes: Lids and lashes normal, pupils equal, round and reactive to light, extra ocular muscles intact, sclera clear, conjunctiva normal  Respiratory: No increased work of breathing, good air exchange, clear to auscultation bilaterally, no crackles or wheezing  Cardiovascular: Normal apical impulse, regular rate and rhythm, normal S1 and S2, no S3 or S4, and no murmur noted  GI: No scars, normal bowel sounds, soft, non-distended, non-tender, no masses palpated, no hepatosplenomegally  Skin: no bruising or bleeding  Musculoskeletal: no lower extremity pitting edema present      Discharge Disposition   Discharged to home  Condition at discharge: Stable    Consultations This Hospital Stay   ENT IP CONSULT  THORACIC SURGERY IP CONSULT  NUTRITION SERVICES ADULT IP CONSULT  PHARMACY IP CONSULT    Time Spent on this Encounter   IKane, personally saw the patient today and spent greater than 30 minutes discharging this patient.    Discharge Orders      Reason for your hospital stay    Aspiration Pneumonia     Follow-up and recommended labs and tests     Follow up with primary care provider, Carlos Gomez, within 7 days for hospital follow- up.  No follow up labs or test are needed.     Activity    Your activity upon discharge: activity as tolerated     Discharge Instructions    Resume home care services     Full Code     Diet    Follow this diet upon discharge: Orders Placed This Encounter      Adult Formula Drip Feeding: Continuous Isosource 1.5; Jejunostomy; Goal Rate: 100; mL/hr; From: 7:00 AM; 7:00 PM; Medication - Feeding  Tube Flush Frequency: At least 15-30 mL water before and after medication administration and with tube clogging;...    Strict nothing by mouth     Discharge Medications   Current Discharge Medication List      CONTINUE these medications which have CHANGED    Details   amoxicillin-clavulanate (AUGMENTIN) 875-125 MG tablet Take 1 tablet by mouth every 12 hours for 10 days  Qty: 20 tablet, Refills: 0    Associated Diagnoses: Pneumonia of both lower lobes due to infectious organism (H)         CONTINUE these medications which have NOT CHANGED    Details   acetaminophen (TYLENOL) 500 MG tablet 1,000 mg by Oral or FT or NG tube route every 6 hours as needed for mild pain      acetylcysteine (MUCOMYST) 20 % neb solution Take 2 mLs by nebulization 4 times daily  Qty: 300 vial, Refills: 0    Associated Diagnoses: Pneumonia of both lower lobes due to infectious organism (H)      albuterol (PROVENTIL) (5 MG/ML) 0.5% neb solution Take 0.5 mLs (2.5 mg) by nebulization every 4 hours (while awake)  Qty: 360 mL, Refills: 0    Associated Diagnoses: Pneumonia of both lower lobes due to infectious organism (H)      bacitracin ointment Apply topically daily To PEG site.       Brivaracetam (BRIVIACT) 10 MG/ML solution 100 mg by Oral or FT or NG tube route 2 times daily @0900 and 2100      calcium carbonate 1250 (500 CA) MG/5ML SUSP suspension 5 mLs (1,250 mg) by Per J Tube route 3 times daily (with meals)  Qty: 450 mL    Associated Diagnoses: Malnutrition (H)      carBAMazepine (TEGRETOL) 100 MG/5ML suspension Take 150 mg by mouth every 6 hours At 06:00, 12:00, 18:00 and 24:00 for seizures      Guar Gum (FIBER MODULAR, NUTRISOURCE FIBER,) packet 1 packet by Per G Tube route daily  Qty: 30 packet, Refills: 0    Associated Diagnoses: Pneumonia of both lower lobes due to infectious organism (H)      !! hydrocortisone (CORTEF) 5 MG tablet 10 mg by Oral or FT or NG tube route daily (with dinner) At 1500      !! hydrocortisone (CORTEF) 5 MG  tablet 20 mg by Oral or FT or NG tube route every morning       levothyroxine (SYNTHROID/LEVOTHROID) 137 MCG tablet 137 mcg by Oral or FT or NG tube route daily      melatonin (MELATONIN) 1 MG/ML LIQD liquid 6 mg by Per NG tube route At Bedtime       Multiple Vitamins-Minerals (CENTRUM SILVER) per tablet Take 1 tablet by mouth daily Crush and feed via j-tube      pantoprazole (PROTONIX) 2 mg/mL SUSP suspension 20 mg by Per NG tube route 2 times daily       potassium & sodium phosphates (NEUTRA-PHOS) 280-160-250 MG Packet Take 1 packet by mouth 3 times daily 0900, 1500, 2100.       saccharomyces boulardii (FLORASTOR) 250 MG capsule 250 mg by Oral or Feeding Tube route daily      Vitamin D, Cholecalciferol, 1000 units TABS Take 2,000 Units by mouth every morning Crush and feed via j-tube      order for DME Equipment being ordered: Nebulizer  Qty: 1 Units, Refills: 0    Associated Diagnoses: Pneumonia of both lower lobes due to infectious organism (H)      testosterone cypionate (DEPOTESTOTERONE CYPIONATE) 200 MG/ML injection Inject 76 mg into the muscle See Admin Instructions Every 2 weeks on Fridays   76 mg or 0.38 mL       !! - Potential duplicate medications found. Please discuss with provider.        Allergies   Allergies   Allergen Reactions     Dilantin [Phenytoin Sodium]      Valproic Acid      Toxicity c bone marrow suspension, elevated ammonia levels      Data   Most Recent 3 CBC's:  Recent Labs   Lab Test 03/17/19  0834 03/15/19  0915 03/14/19  0215   WBC 6.1 6.9 24.1*   HGB 11.0* 9.9* 12.7*   MCV 83 82 81    138* 182      Most Recent 3 BMP's:  Recent Labs   Lab Test 03/18/19  0601 03/17/19  0834 03/15/19  0915   NA Canceled, Test credited 140 143   POTASSIUM Canceled, Test credited 3.5 3.5   CHLORIDE Canceled, Test credited 107 110*   CO2 Canceled, Test credited 28 24   BUN Canceled, Test credited 9 9   CR Canceled, Test credited 0.98 0.96   ANIONGAP Canceled, Test credited 5 9   STEVE Canceled, Test  credited 8.3* 8.2*   GLC Canceled, Test credited 125* 93     Most Recent 2 LFT's:  Recent Labs   Lab Test 03/14/19  0215 03/09/19 2000   AST 24 23   ALT 38 38   ALKPHOS 84 103   BILITOTAL 0.4 0.2     Most Recent INR's and Anticoagulation Dosing History:  Anticoagulation Dose History     Recent Dosing and Labs Latest Ref Rng & Units 12/1/2016 1/22/2017 1/22/2017 1/23/2017 1/25/2017 1/30/2017 2/7/2017    INR 0.86 - 1.14 1.30(H) 2.48(H) 2.58(H) 1.53(H) 1.49(H) 1.32(H) 1.27(H)        Most Recent 3 Troponin's:  Recent Labs   Lab Test 01/22/17  0500 01/21/17  2348 01/21/17  1600   TROPI 0.017 0.025 0.028     Most Recent Cholesterol Panel:  Recent Labs   Lab Test 11/29/16  0430   TRIG 100     Most Recent 6 Bacteria Isolates From Any Culture (See EPIC Reports for Culture Details):  Recent Labs   Lab Test 03/14/19  0345 03/14/19  0215 03/09/19  2110 03/09/19  2100 03/09/19 2000 02/27/19  2130   CULT No growth after 4 days No growth after 4 days No growth No growth No growth No growth     Most Recent TSH, T4 and A1c Labs:  Recent Labs   Lab Test 11/22/18  1438 07/05/18  0021   TSH  --  <0.01*   T4  --  1.66*   A1C 6.3*  --      Results for orders placed or performed during the hospital encounter of 03/14/19   XR Chest 2 Views    Narrative    XR CHEST 2 VW  3/14/2019 2:47 AM     HISTORY: History of pneumonia.    COMPARISON: 3/9/2019.    FINDINGS: The heart size is normal. There is right basilar infiltrate  which appears slightly worse than on the previous exam. Mild  atelectasis or scarring at the left lung base. The right hemidiaphragm  is elevated. There is no pneumothorax or pleural effusion.      Impression    IMPRESSION: Right basilar pneumonia.    BECKIE KATE MD     Most Recent 3 CBC's:  Recent Labs   Lab Test 03/17/19  0834 03/15/19  0915 03/14/19  0215   WBC 6.1 6.9 24.1*   HGB 11.0* 9.9* 12.7*   MCV 83 82 81    138* 182     Most Recent 3 BMP's:  Recent Labs   Lab Test 03/18/19  0601 03/17/19  0834  03/15/19  0915   NA Canceled, Test credited 140 143   POTASSIUM Canceled, Test credited 3.5 3.5   CHLORIDE Canceled, Test credited 107 110*   CO2 Canceled, Test credited 28 24   BUN Canceled, Test credited 9 9   CR Canceled, Test credited 0.98 0.96   ANIONGAP Canceled, Test credited 5 9   STEVE Canceled, Test credited 8.3* 8.2*   GLC Canceled, Test credited 125* 93

## 2019-03-18 NOTE — PROGRESS NOTES
Discharge    Patient discharged to Home via stretcher with HE  Care plan note:Pt resting comfortably this AM. Lungs clear, diminished, no coughing noted. VSS. Tolerating TF, G-tube site clean and dry. Coccyx reddened, dressing changed. Pt discharging to home today.        Listed belongings gathered and returned to patient. Yes  Care Plan and Patient education resolved: Yes  Prescriptions if needed, hard copies sent with patient  NA  Home and hospital acquired medications returned to patient: NA  Medication Bin checked and emptied on discharge Yes  Follow up appointment made for patient: Yes

## 2019-03-18 NOTE — PLAN OF CARE
Pt alert, non verbal, follows commands and utilizes hand gestures. HECTOR orientation. VSS on RA sating in upper 90's. Lung sounds diminished. Frequent productive cough. Denies pain. BLE and RUE contractures. Blanchable redness on coccyx, mepilex applied. Redness on groin, barrier cream applied. T&R Q2, Assist of two with mechanical lift.  Tube feeding stopped at 1900. Start @ 0700 today. NPO. Using only J-tube for medications and tube feeds. Nursing will continue to monitor.

## 2019-03-30 ENCOUNTER — HOSPITAL ENCOUNTER (INPATIENT)
Facility: CLINIC | Age: 59
LOS: 3 days | Discharge: HOME HEALTH | DRG: 871 | End: 2019-04-02
Attending: EMERGENCY MEDICINE | Admitting: INTERNAL MEDICINE
Payer: MEDICARE

## 2019-03-30 ENCOUNTER — APPOINTMENT (OUTPATIENT)
Dept: GENERAL RADIOLOGY | Facility: CLINIC | Age: 59
DRG: 871 | End: 2019-03-30
Attending: EMERGENCY MEDICINE
Payer: MEDICARE

## 2019-03-30 DIAGNOSIS — K21.9 GASTROESOPHAGEAL REFLUX DISEASE, ESOPHAGITIS PRESENCE NOT SPECIFIED: Primary | ICD-10-CM

## 2019-03-30 DIAGNOSIS — E23.0 PANHYPOPITUITARISM (H): ICD-10-CM

## 2019-03-30 DIAGNOSIS — K94.23 PEG TUBE MALFUNCTION (H): ICD-10-CM

## 2019-03-30 DIAGNOSIS — J69.0 ASPIRATION PNEUMONIA OF BOTH LOWER LOBES DUE TO GASTRIC SECRETIONS (H): ICD-10-CM

## 2019-03-30 DIAGNOSIS — J18.9 PNEUMONIA OF BOTH LOWER LOBES DUE TO INFECTIOUS ORGANISM: ICD-10-CM

## 2019-03-30 DIAGNOSIS — G40.909 RECURRENT SEIZURES (H): ICD-10-CM

## 2019-03-30 DIAGNOSIS — J18.9 PNEUMONIA OF RIGHT LOWER LOBE DUE TO INFECTIOUS ORGANISM: ICD-10-CM

## 2019-03-30 LAB
ALBUMIN SERPL-MCNC: 3.2 G/DL (ref 3.4–5)
ALBUMIN UR-MCNC: 30 MG/DL
ALP SERPL-CCNC: 107 U/L (ref 40–150)
ALT SERPL W P-5'-P-CCNC: 40 U/L (ref 0–70)
ANION GAP SERPL CALCULATED.3IONS-SCNC: 9 MMOL/L (ref 3–14)
APPEARANCE UR: CLEAR
AST SERPL W P-5'-P-CCNC: 22 U/L (ref 0–45)
BASOPHILS # BLD AUTO: 0.1 10E9/L (ref 0–0.2)
BASOPHILS NFR BLD AUTO: 0.6 %
BILIRUB SERPL-MCNC: 0.2 MG/DL (ref 0.2–1.3)
BILIRUB UR QL STRIP: NEGATIVE
BUN SERPL-MCNC: 14 MG/DL (ref 7–30)
CALCIUM SERPL-MCNC: 8.8 MG/DL (ref 8.5–10.1)
CHLORIDE SERPL-SCNC: 101 MMOL/L (ref 94–109)
CO2 SERPL-SCNC: 29 MMOL/L (ref 20–32)
COLOR UR AUTO: YELLOW
CREAT SERPL-MCNC: 0.82 MG/DL (ref 0.66–1.25)
DIFFERENTIAL METHOD BLD: ABNORMAL
EOSINOPHIL # BLD AUTO: 0.4 10E9/L (ref 0–0.7)
EOSINOPHIL NFR BLD AUTO: 2.4 %
ERYTHROCYTE [DISTWIDTH] IN BLOOD BY AUTOMATED COUNT: 15.4 % (ref 10–15)
GFR SERPL CREATININE-BSD FRML MDRD: >90 ML/MIN/{1.73_M2}
GLUCOSE SERPL-MCNC: 142 MG/DL (ref 70–99)
GLUCOSE UR STRIP-MCNC: 70 MG/DL
HCT VFR BLD AUTO: 39.7 % (ref 40–53)
HGB BLD-MCNC: 12.9 G/DL (ref 13.3–17.7)
HGB UR QL STRIP: NEGATIVE
IMM GRANULOCYTES # BLD: 0 10E9/L (ref 0–0.4)
IMM GRANULOCYTES NFR BLD: 0.3 %
KETONES UR STRIP-MCNC: NEGATIVE MG/DL
LACTATE BLD-SCNC: 1.7 MMOL/L (ref 0.7–2)
LEUKOCYTE ESTERASE UR QL STRIP: NEGATIVE
LYMPHOCYTES # BLD AUTO: 2.2 10E9/L (ref 0.8–5.3)
LYMPHOCYTES NFR BLD AUTO: 15.2 %
MCH RBC QN AUTO: 27.3 PG (ref 26.5–33)
MCHC RBC AUTO-ENTMCNC: 32.5 G/DL (ref 31.5–36.5)
MCV RBC AUTO: 84 FL (ref 78–100)
MONOCYTES # BLD AUTO: 1 10E9/L (ref 0–1.3)
MONOCYTES NFR BLD AUTO: 7.1 %
NEUTROPHILS # BLD AUTO: 10.8 10E9/L (ref 1.6–8.3)
NEUTROPHILS NFR BLD AUTO: 74.4 %
NITRATE UR QL: NEGATIVE
NRBC # BLD AUTO: 0 10*3/UL
NRBC BLD AUTO-RTO: 0 /100
PH UR STRIP: 8.5 PH (ref 5–7)
PLATELET # BLD AUTO: 233 10E9/L (ref 150–450)
POTASSIUM SERPL-SCNC: 4.1 MMOL/L (ref 3.4–5.3)
PROT SERPL-MCNC: 8.6 G/DL (ref 6.8–8.8)
RBC # BLD AUTO: 4.72 10E12/L (ref 4.4–5.9)
RBC #/AREA URNS AUTO: <1 /HPF (ref 0–2)
SODIUM SERPL-SCNC: 139 MMOL/L (ref 133–144)
SOURCE: ABNORMAL
SP GR UR STRIP: 1.01 (ref 1–1.03)
UROBILINOGEN UR STRIP-MCNC: NORMAL MG/DL (ref 0–2)
WBC # BLD AUTO: 14.5 10E9/L (ref 4–11)
WBC #/AREA URNS AUTO: <1 /HPF (ref 0–5)

## 2019-03-30 PROCEDURE — 80053 COMPREHEN METABOLIC PANEL: CPT | Performed by: EMERGENCY MEDICINE

## 2019-03-30 PROCEDURE — 25800030 ZZH RX IP 258 OP 636: Performed by: EMERGENCY MEDICINE

## 2019-03-30 PROCEDURE — C9113 INJ PANTOPRAZOLE SODIUM, VIA: HCPCS | Performed by: EMERGENCY MEDICINE

## 2019-03-30 PROCEDURE — 81001 URINALYSIS AUTO W/SCOPE: CPT | Performed by: EMERGENCY MEDICINE

## 2019-03-30 PROCEDURE — 87086 URINE CULTURE/COLONY COUNT: CPT | Performed by: EMERGENCY MEDICINE

## 2019-03-30 PROCEDURE — 83605 ASSAY OF LACTIC ACID: CPT | Performed by: EMERGENCY MEDICINE

## 2019-03-30 PROCEDURE — 12000000 ZZH R&B MED SURG/OB

## 2019-03-30 PROCEDURE — 25000132 ZZH RX MED GY IP 250 OP 250 PS 637: Mod: GY | Performed by: EMERGENCY MEDICINE

## 2019-03-30 PROCEDURE — 36415 COLL VENOUS BLD VENIPUNCTURE: CPT

## 2019-03-30 PROCEDURE — 99223 1ST HOSP IP/OBS HIGH 75: CPT | Mod: AI | Performed by: INTERNAL MEDICINE

## 2019-03-30 PROCEDURE — 96365 THER/PROPH/DIAG IV INF INIT: CPT

## 2019-03-30 PROCEDURE — 87040 BLOOD CULTURE FOR BACTERIA: CPT | Performed by: EMERGENCY MEDICINE

## 2019-03-30 PROCEDURE — 25000128 H RX IP 250 OP 636: Performed by: EMERGENCY MEDICINE

## 2019-03-30 PROCEDURE — 99285 EMERGENCY DEPT VISIT HI MDM: CPT | Mod: 25

## 2019-03-30 PROCEDURE — 85025 COMPLETE CBC W/AUTO DIFF WBC: CPT | Performed by: EMERGENCY MEDICINE

## 2019-03-30 PROCEDURE — 96375 TX/PRO/DX INJ NEW DRUG ADDON: CPT

## 2019-03-30 PROCEDURE — 71046 X-RAY EXAM CHEST 2 VIEWS: CPT

## 2019-03-30 RX ORDER — LIDOCAINE 40 MG/G
CREAM TOPICAL
Status: DISCONTINUED | OUTPATIENT
Start: 2019-03-30 | End: 2019-04-02 | Stop reason: HOSPADM

## 2019-03-30 RX ORDER — METOCLOPRAMIDE HYDROCHLORIDE 5 MG/ML
10 INJECTION INTRAMUSCULAR; INTRAVENOUS EVERY 6 HOURS PRN
Status: DISCONTINUED | OUTPATIENT
Start: 2019-03-30 | End: 2019-04-01

## 2019-03-30 RX ORDER — AMOXICILLIN 250 MG
1 CAPSULE ORAL 2 TIMES DAILY PRN
Status: DISCONTINUED | OUTPATIENT
Start: 2019-03-30 | End: 2019-04-02

## 2019-03-30 RX ORDER — BACITRACIN ZINC 500 [USP'U]/G
OINTMENT TOPICAL DAILY
Status: DISCONTINUED | OUTPATIENT
Start: 2019-03-31 | End: 2019-04-02 | Stop reason: HOSPADM

## 2019-03-30 RX ORDER — SODIUM CHLORIDE, SODIUM LACTATE, POTASSIUM CHLORIDE, CALCIUM CHLORIDE 600; 310; 30; 20 MG/100ML; MG/100ML; MG/100ML; MG/100ML
INJECTION, SOLUTION INTRAVENOUS CONTINUOUS
Status: DISCONTINUED | OUTPATIENT
Start: 2019-03-30 | End: 2019-03-30

## 2019-03-30 RX ORDER — AMOXICILLIN 250 MG
2 CAPSULE ORAL 2 TIMES DAILY PRN
Status: DISCONTINUED | OUTPATIENT
Start: 2019-03-30 | End: 2019-04-02

## 2019-03-30 RX ORDER — LANOLIN ALCOHOL/MO/W.PET/CERES
6 CREAM (GRAM) TOPICAL AT BEDTIME
Status: DISCONTINUED | OUTPATIENT
Start: 2019-03-30 | End: 2019-04-02 | Stop reason: HOSPADM

## 2019-03-30 RX ORDER — METOCLOPRAMIDE 10 MG/1
10 TABLET ORAL EVERY 6 HOURS PRN
Status: DISCONTINUED | OUTPATIENT
Start: 2019-03-30 | End: 2019-04-01

## 2019-03-30 RX ORDER — ALBUTEROL SULFATE 5 MG/ML
2.5 SOLUTION RESPIRATORY (INHALATION)
Status: DISCONTINUED | OUTPATIENT
Start: 2019-03-30 | End: 2019-03-31

## 2019-03-30 RX ORDER — HYDROCORTISONE 20 MG/1
20 TABLET ORAL EVERY MORNING
Status: DISCONTINUED | OUTPATIENT
Start: 2019-03-31 | End: 2019-04-02 | Stop reason: HOSPADM

## 2019-03-30 RX ORDER — PROCHLORPERAZINE MALEATE 5 MG
10 TABLET ORAL EVERY 6 HOURS PRN
Status: DISCONTINUED | OUTPATIENT
Start: 2019-03-30 | End: 2019-04-02 | Stop reason: HOSPADM

## 2019-03-30 RX ORDER — NALOXONE HYDROCHLORIDE 0.4 MG/ML
.1-.4 INJECTION, SOLUTION INTRAMUSCULAR; INTRAVENOUS; SUBCUTANEOUS
Status: DISCONTINUED | OUTPATIENT
Start: 2019-03-30 | End: 2019-04-02 | Stop reason: HOSPADM

## 2019-03-30 RX ORDER — CARBAMAZEPINE 100 MG/5ML
150 SUSPENSION ORAL EVERY 6 HOURS
Status: DISCONTINUED | OUTPATIENT
Start: 2019-03-31 | End: 2019-03-31

## 2019-03-30 RX ORDER — PIPERACILLIN SODIUM, TAZOBACTAM SODIUM 4; .5 G/20ML; G/20ML
4.5 INJECTION, POWDER, LYOPHILIZED, FOR SOLUTION INTRAVENOUS EVERY 6 HOURS
Status: DISCONTINUED | OUTPATIENT
Start: 2019-03-31 | End: 2019-04-02 | Stop reason: HOSPADM

## 2019-03-30 RX ORDER — ACETYLCYSTEINE 200 MG/ML
2 SOLUTION ORAL; RESPIRATORY (INHALATION) 4 TIMES DAILY
Status: DISCONTINUED | OUTPATIENT
Start: 2019-03-31 | End: 2019-04-02 | Stop reason: HOSPADM

## 2019-03-30 RX ORDER — POLYETHYLENE GLYCOL 3350 17 G/17G
17 POWDER, FOR SOLUTION ORAL DAILY PRN
Status: DISCONTINUED | OUTPATIENT
Start: 2019-03-30 | End: 2019-04-02

## 2019-03-30 RX ORDER — CALCIUM CARBONATE 1250 MG/5ML
1250 SUSPENSION ORAL
Status: DISCONTINUED | OUTPATIENT
Start: 2019-03-31 | End: 2019-04-02 | Stop reason: HOSPADM

## 2019-03-30 RX ORDER — GUAR GUM
1 PACKET (EA) ORAL DAILY
Status: DISCONTINUED | OUTPATIENT
Start: 2019-03-31 | End: 2019-04-02 | Stop reason: HOSPADM

## 2019-03-30 RX ORDER — POTASSIUM CHLORIDE 1500 MG/1
20-40 TABLET, EXTENDED RELEASE ORAL
Status: DISCONTINUED | OUTPATIENT
Start: 2019-03-30 | End: 2019-04-02 | Stop reason: HOSPADM

## 2019-03-30 RX ORDER — ONDANSETRON 4 MG/1
4 TABLET, ORALLY DISINTEGRATING ORAL EVERY 6 HOURS PRN
Status: DISCONTINUED | OUTPATIENT
Start: 2019-03-30 | End: 2019-04-02 | Stop reason: HOSPADM

## 2019-03-30 RX ORDER — BISACODYL 10 MG
10 SUPPOSITORY, RECTAL RECTAL DAILY PRN
Status: DISCONTINUED | OUTPATIENT
Start: 2019-03-30 | End: 2019-04-02 | Stop reason: HOSPADM

## 2019-03-30 RX ORDER — PROCHLORPERAZINE 25 MG
25 SUPPOSITORY, RECTAL RECTAL EVERY 12 HOURS PRN
Status: DISCONTINUED | OUTPATIENT
Start: 2019-03-30 | End: 2019-04-02 | Stop reason: HOSPADM

## 2019-03-30 RX ORDER — MAGNESIUM SULFATE HEPTAHYDRATE 40 MG/ML
4 INJECTION, SOLUTION INTRAVENOUS EVERY 4 HOURS PRN
Status: DISCONTINUED | OUTPATIENT
Start: 2019-03-30 | End: 2019-04-02 | Stop reason: HOSPADM

## 2019-03-30 RX ORDER — POTASSIUM CHLORIDE 7.45 MG/ML
10 INJECTION INTRAVENOUS
Status: DISCONTINUED | OUTPATIENT
Start: 2019-03-30 | End: 2019-04-02 | Stop reason: HOSPADM

## 2019-03-30 RX ORDER — ACETAMINOPHEN 500 MG
1000 TABLET ORAL EVERY 6 HOURS PRN
Status: DISCONTINUED | OUTPATIENT
Start: 2019-03-30 | End: 2019-04-02 | Stop reason: HOSPADM

## 2019-03-30 RX ORDER — POTASSIUM CL/LIDO/0.9 % NACL 10MEQ/0.1L
10 INTRAVENOUS SOLUTION, PIGGYBACK (ML) INTRAVENOUS
Status: DISCONTINUED | OUTPATIENT
Start: 2019-03-30 | End: 2019-04-02 | Stop reason: HOSPADM

## 2019-03-30 RX ORDER — PIPERACILLIN SODIUM, TAZOBACTAM SODIUM 4; .5 G/20ML; G/20ML
4.5 INJECTION, POWDER, LYOPHILIZED, FOR SOLUTION INTRAVENOUS ONCE
Status: COMPLETED | OUTPATIENT
Start: 2019-03-30 | End: 2019-03-30

## 2019-03-30 RX ORDER — POTASSIUM CHLORIDE 1.5 G/1.58G
20-40 POWDER, FOR SOLUTION ORAL
Status: DISCONTINUED | OUTPATIENT
Start: 2019-03-30 | End: 2019-04-02 | Stop reason: HOSPADM

## 2019-03-30 RX ORDER — POTASSIUM CHLORIDE 29.8 MG/ML
20 INJECTION INTRAVENOUS
Status: DISCONTINUED | OUTPATIENT
Start: 2019-03-30 | End: 2019-04-02 | Stop reason: HOSPADM

## 2019-03-30 RX ORDER — ONDANSETRON 2 MG/ML
4 INJECTION INTRAMUSCULAR; INTRAVENOUS EVERY 6 HOURS PRN
Status: DISCONTINUED | OUTPATIENT
Start: 2019-03-30 | End: 2019-04-02 | Stop reason: HOSPADM

## 2019-03-30 RX ORDER — HYDROCORTISONE 10 MG/1
10 TABLET ORAL
Status: DISCONTINUED | OUTPATIENT
Start: 2019-03-31 | End: 2019-04-02 | Stop reason: HOSPADM

## 2019-03-30 RX ORDER — MULTIPLE VITAMINS W/ MINERALS TAB 9MG-400MCG
1 TAB ORAL DAILY
Status: DISCONTINUED | OUTPATIENT
Start: 2019-03-31 | End: 2019-03-31

## 2019-03-30 RX ADMIN — BRIVARACETAM 100 MG: 10 SOLUTION ORAL at 21:55

## 2019-03-30 RX ADMIN — PANTOPRAZOLE SODIUM 40 MG: 40 INJECTION, POWDER, FOR SOLUTION INTRAVENOUS at 21:43

## 2019-03-30 RX ADMIN — SODIUM CHLORIDE, POTASSIUM CHLORIDE, SODIUM LACTATE AND CALCIUM CHLORIDE 2000 ML: 600; 310; 30; 20 INJECTION, SOLUTION INTRAVENOUS at 20:13

## 2019-03-30 RX ADMIN — PIPERACILLIN SODIUM,TAZOBACTAM SODIUM 4.5 G: 4; .5 INJECTION, POWDER, FOR SOLUTION INTRAVENOUS at 21:07

## 2019-03-30 RX ADMIN — SODIUM CHLORIDE, POTASSIUM CHLORIDE, SODIUM LACTATE AND CALCIUM CHLORIDE: 600; 310; 30; 20 INJECTION, SOLUTION INTRAVENOUS at 21:56

## 2019-03-30 ASSESSMENT — MIFFLIN-ST. JEOR
SCORE: 1563.38
SCORE: 1546.14

## 2019-03-30 ASSESSMENT — ENCOUNTER SYMPTOMS
VOMITING: 1
FEVER: 1

## 2019-03-30 NOTE — LETTER
Transition Communication Hand-off for Care Transitions to Next Level of Care Provider    Name: Keyon Farias  : 1960  MRN #: [id number]  Primary Care Provider: Carlos Gomez  Primary Care MD Name: Dr Gomez  Primary Clinic: [address] [address]  [place] [facility] [place]  Reason for Hospitalization:  Pneumonia of right lower lobe due to infectious organism (H) [J18.1]  Admit Date/Time: 3/30/2019  7:25 PM  Discharge Date: 19  Payor Source: Payor: MEDICARE / Plan: MEDICARE / Product Type: Medicare /     Readmission Assessment Measure (HANS) Risk Score/category: Extreme    Follow-up plan: PCP 19    Any outstanding tests or procedures:        Referrals     Future Labs/Procedures    Home care nursing referral     Comments:    RN skilled nursing visit. Continue prior care plan.     Your provider has ordered home care nursing services. If you have not been contacted within 2 days of your discharge please call the inpatient department phone number at [phone] .            Key Recommendations:  Pt was readmitted with aspiration pneumonia.  This is pt's 7th admission this year.  ID consulted (Dr Gaitan) and felt this was primarily a chemical pneumonitis event rather than a true infection.  Pt received Zosyn from admission and switched to oral Augmentin for one week.  It was also felt maximum interventions were being done to try to prevent aspiration from occurring.  No antibiotic strategy that is effective for this situation.   His mother has not wanted to talk to Palliative Care in the past.    Thank-you,    Karen Collins  RN Care Coordinator  [facility]    AVS/Discharge Summary is the source of truth; this is a helpful guide for improved communication of patient story

## 2019-03-31 LAB — GLUCOSE BLDC GLUCOMTR-MCNC: 174 MG/DL (ref 70–99)

## 2019-03-31 PROCEDURE — 40000275 ZZH STATISTIC RCP TIME EA 10 MIN

## 2019-03-31 PROCEDURE — 12000000 ZZH R&B MED SURG/OB

## 2019-03-31 PROCEDURE — 00000146 ZZHCL STATISTIC GLUCOSE BY METER IP

## 2019-03-31 PROCEDURE — 94640 AIRWAY INHALATION TREATMENT: CPT | Mod: 76

## 2019-03-31 PROCEDURE — 25000128 H RX IP 250 OP 636: Performed by: INTERNAL MEDICINE

## 2019-03-31 PROCEDURE — 99232 SBSQ HOSP IP/OBS MODERATE 35: CPT | Performed by: HOSPITALIST

## 2019-03-31 PROCEDURE — 27210429 ZZH NUTRITION PRODUCT INTERMEDIATE LITER

## 2019-03-31 PROCEDURE — A9270 NON-COVERED ITEM OR SERVICE: HCPCS | Mod: GY | Performed by: INTERNAL MEDICINE

## 2019-03-31 PROCEDURE — 25000125 ZZHC RX 250

## 2019-03-31 PROCEDURE — 25000132 ZZH RX MED GY IP 250 OP 250 PS 637: Mod: GY | Performed by: INTERNAL MEDICINE

## 2019-03-31 PROCEDURE — 94640 AIRWAY INHALATION TREATMENT: CPT

## 2019-03-31 PROCEDURE — 25000125 ZZHC RX 250: Performed by: INTERNAL MEDICINE

## 2019-03-31 RX ORDER — ALBUTEROL SULFATE 0.83 MG/ML
SOLUTION RESPIRATORY (INHALATION)
Status: COMPLETED
Start: 2019-03-31 | End: 2019-03-31

## 2019-03-31 RX ORDER — MULTIPLE VITAMINS W/ MINERALS TAB 9MG-400MCG
1 TAB ORAL DAILY
Status: DISCONTINUED | OUTPATIENT
Start: 2019-03-31 | End: 2019-03-31

## 2019-03-31 RX ORDER — CARBAMAZEPINE 100 MG/5ML
150 SUSPENSION ORAL EVERY 6 HOURS
Status: DISCONTINUED | OUTPATIENT
Start: 2019-03-31 | End: 2019-04-02 | Stop reason: HOSPADM

## 2019-03-31 RX ORDER — ALBUTEROL SULFATE 0.83 MG/ML
2.5 SOLUTION RESPIRATORY (INHALATION)
Status: DISCONTINUED | OUTPATIENT
Start: 2019-03-31 | End: 2019-04-02 | Stop reason: HOSPADM

## 2019-03-31 RX ADMIN — ALBUTEROL SULFATE 2.5 MG: 2.5 SOLUTION RESPIRATORY (INHALATION) at 20:10

## 2019-03-31 RX ADMIN — CALCIUM CARBONATE 1250 MG: 1250 SUSPENSION ORAL at 14:15

## 2019-03-31 RX ADMIN — POTASSIUM & SODIUM PHOSPHATES POWDER PACK 280-160-250 MG 1 PACKET: 280-160-250 PACK at 08:55

## 2019-03-31 RX ADMIN — ALBUTEROL SULFATE 2.5 MG: 2.5 SOLUTION RESPIRATORY (INHALATION) at 12:24

## 2019-03-31 RX ADMIN — PIPERACILLIN SODIUM,TAZOBACTAM SODIUM 4.5 G: 4; .5 INJECTION, POWDER, FOR SOLUTION INTRAVENOUS at 22:13

## 2019-03-31 RX ADMIN — Medication 40 MG: at 08:53

## 2019-03-31 RX ADMIN — BACITRACIN: 500 OINTMENT TOPICAL at 08:58

## 2019-03-31 RX ADMIN — CARBAMAZEPINE 150 MG: 100 SUSPENSION ORAL at 00:19

## 2019-03-31 RX ADMIN — ACETYLCYSTEINE 2 ML: 200 SOLUTION ORAL; RESPIRATORY (INHALATION) at 12:24

## 2019-03-31 RX ADMIN — ACETYLCYSTEINE 2 ML: 200 SOLUTION ORAL; RESPIRATORY (INHALATION) at 16:27

## 2019-03-31 RX ADMIN — ALBUTEROL SULFATE 2.5 MG: 2.5 SOLUTION RESPIRATORY (INHALATION) at 16:15

## 2019-03-31 RX ADMIN — CARBAMAZEPINE 150 MG: 100 SUSPENSION ORAL at 06:20

## 2019-03-31 RX ADMIN — PIPERACILLIN SODIUM,TAZOBACTAM SODIUM 4.5 G: 4; .5 INJECTION, POWDER, FOR SOLUTION INTRAVENOUS at 08:38

## 2019-03-31 RX ADMIN — CARBAMAZEPINE 150 MG: 100 SUSPENSION ORAL at 12:16

## 2019-03-31 RX ADMIN — Medication 1 PACKET: at 12:02

## 2019-03-31 RX ADMIN — HYDROCORTISONE 20 MG: 20 TABLET ORAL at 08:47

## 2019-03-31 RX ADMIN — MULTIVITAMIN 15 ML: LIQUID ORAL at 13:36

## 2019-03-31 RX ADMIN — POTASSIUM & SODIUM PHOSPHATES POWDER PACK 280-160-250 MG 1 PACKET: 280-160-250 PACK at 22:13

## 2019-03-31 RX ADMIN — PIPERACILLIN SODIUM,TAZOBACTAM SODIUM 4.5 G: 4; .5 INJECTION, POWDER, FOR SOLUTION INTRAVENOUS at 03:22

## 2019-03-31 RX ADMIN — LEVOTHYROXINE SODIUM 137 MCG: 112 TABLET ORAL at 06:20

## 2019-03-31 RX ADMIN — BRIVARACETAM 100 MG: 10 SOLUTION ORAL at 09:02

## 2019-03-31 RX ADMIN — ENOXAPARIN SODIUM 40 MG: 40 INJECTION SUBCUTANEOUS at 00:19

## 2019-03-31 RX ADMIN — MELATONIN 6 MG: 3 TAB ORAL at 22:13

## 2019-03-31 RX ADMIN — CARBAMAZEPINE 150 MG: 100 SUSPENSION ORAL at 18:04

## 2019-03-31 RX ADMIN — PIPERACILLIN SODIUM,TAZOBACTAM SODIUM 4.5 G: 4; .5 INJECTION, POWDER, FOR SOLUTION INTRAVENOUS at 14:48

## 2019-03-31 RX ADMIN — BRIVARACETAM 100 MG: 10 SOLUTION ORAL at 22:33

## 2019-03-31 RX ADMIN — VITAMIN D, TAB 1000IU (100/BT) 2000 UNITS: 25 TAB at 08:46

## 2019-03-31 RX ADMIN — MELATONIN 6 MG: 3 TAB ORAL at 00:20

## 2019-03-31 RX ADMIN — ENOXAPARIN SODIUM 40 MG: 40 INJECTION SUBCUTANEOUS at 22:13

## 2019-03-31 RX ADMIN — MINERAL SUPPLEMENT IRON 300 MG / 5 ML STRENGTH LIQUID 100 PER BOX UNFLAVORED 300 MG: at 08:55

## 2019-03-31 RX ADMIN — POTASSIUM & SODIUM PHOSPHATES POWDER PACK 280-160-250 MG 1 PACKET: 280-160-250 PACK at 01:03

## 2019-03-31 RX ADMIN — CALCIUM CARBONATE 1250 MG: 1250 SUSPENSION ORAL at 22:27

## 2019-03-31 RX ADMIN — ACETYLCYSTEINE 2 ML: 200 SOLUTION ORAL; RESPIRATORY (INHALATION) at 20:10

## 2019-03-31 RX ADMIN — HYDROCORTISONE 10 MG: 10 TABLET ORAL at 18:03

## 2019-03-31 RX ADMIN — ALBUTEROL SULFATE 2.5 MG: 2.5 SOLUTION RESPIRATORY (INHALATION) at 07:56

## 2019-03-31 RX ADMIN — CALCIUM CARBONATE 1250 MG: 1250 SUSPENSION ORAL at 08:53

## 2019-03-31 RX ADMIN — POTASSIUM & SODIUM PHOSPHATES POWDER PACK 280-160-250 MG 1 PACKET: 280-160-250 PACK at 14:15

## 2019-03-31 RX ADMIN — ACETYLCYSTEINE 2 ML: 200 SOLUTION ORAL; RESPIRATORY (INHALATION) at 07:56

## 2019-03-31 ASSESSMENT — ACTIVITIES OF DAILY LIVING (ADL)
ADLS_ACUITY_SCORE: 41
ADLS_ACUITY_SCORE: 41
ADLS_ACUITY_SCORE: 45

## 2019-03-31 ASSESSMENT — MIFFLIN-ST. JEOR: SCORE: 1556.38

## 2019-03-31 NOTE — CONSULTS
"CLINICAL NUTRITION SERVICES  -  ASSESSMENT NOTE      Recommendations Ordered by Registered Dietitian (RD):   Ordered standard lab monitoring   Malnutrition (3/31):   % Weight Loss:  None noted  % Intake:  No decreased intake noted  Subcutaneous Fat Loss:  None observed  Muscle Loss:  None observed  Fluid Retention:  None noted    Malnutrition Diagnosis: Patient does not meet two of the above criteria necessary for diagnosing malnutrition          REASON FOR ASSESSMENT  Keyon Farias is a 58 year old male seen by Registered Dietitian for Provider Order - Registered Dietitian to Assess and Order TF per Medical Nutrition Therapy Protocol      NUTRITION HISTORY  - Unable to obtain nutrition history from the pt.   He is well known to our service, he has been on TF via GJ tube since August 2016; last tube was placed 3/8/19 - 18Fr TORY GJ tube.  Per previous records pt's home TF regimen is as follows - Jevity 1.5 (5 to 5.5 cans daily) x 12 hours during the day to provide 4889-9626 kcal, 77-83g protein, ~260g CHO, ~27g fiber and ~900mL free water. H2O flushes 120 mL QID.  Pt lives with his mother, she likes to keep the TF rate at no more than 100 ml/hr. He is fed during the day rather than at night so that he can be up in the chair to prevent aspiration.   His home meds include Vit D, Benefiber, Florastor, Neutraphos, and Centrum Silver vitamins.      CURRENT NUTRITION ORDERS  Diet Order:     PATRICIA RIVERA ordered TF: Isosource 1.5 @ 100 mL/hr x 12 hours from 7AM to 7PM.      NUTRITION FOCUSED PHYSICAL ASSESSMENT (NFPA)  Completed:       Yes  -   Visual Assessment only         Observed      No nutrition-related physical findings   Obtained from Chart/Interdisciplinary Team      No nutrition-related physical findings noted          ANTHROPOMETRICS  Height: 6' 0\"  Weight: 162 lbs 4.14 oz (73.6 kg) - 3/31  Body mass index is 22.01 kg/m .  Weight Status:  Normal BMI  IBW: 81 kg  % IBW: 91%  Weight History:   Wt Readings from Last 10 " Encounters:   03/31/19 73.6 kg (162 lb 4.1 oz)   03/18/19 70.9 kg (156 lb 4.9 oz)   03/11/19 77.6 kg (171 lb)   03/04/19 72.6 kg (160 lb)   02/22/19 74.4 kg (164 lb 0.4 oz)   02/01/19 71.5 kg (157 lb 11.2 oz)   01/16/19 71.6 kg (157 lb 13.6 oz)   12/24/18 62 kg (136 lb 11 oz)   11/25/18 71.7 kg (158 lb 1.1 oz)   11/04/18 71.5 kg (157 lb 10.1 oz)       LABS  Labs reviewed    MEDICATIONS  Vit D, Calcium, Fe, Thera-Vit-M  Neutraphos  Nutrisource FIber, 1 packet      ASSESSED NUTRITION NEEDS PER APPROVED PRACTICE GUIDELINES:  Dosing Weight 73.6 kg - 3/31  Estimated Energy Needs: 7917-8733 kcals (25-30 Kcal/Kg)  Justification: maintenance  Estimated Protein Needs: 75-95 grams protein (1-1.3 g pro/Kg)  Justification: preservation of lean body mass  Estimated Fluid Needs: 1 mL/Kcal  Justification: maintenance    MALNUTRITION:  % Weight Loss:  None noted  % Intake:  No decreased intake noted  Subcutaneous Fat Loss:  None observed  Muscle Loss:  None observed  Fluid Retention:  None noted    Malnutrition Diagnosis: Patient does not meet two of the above criteria necessary for diagnosing malnutrition      NUTRITION DIAGNOSIS:  No nutrition diagnosis identified at this time       NUTRITION INTERVENTIONS  Recommendations / Nutrition Prescription  Continue pt's usual feeding as ordered -  Isosource 1.5 at 100 mL/hr x 12 hours (7 am - 7 pm) to provide 1800 kcal (25 kcal/kg), 82g protein (1.1 gl/kg), 211g CHO, 18g fiber and 912mL water, 100% RDIs for vit/minerals   H2O flushes of 150 mL q 4 hours..      Implementation  Nutrition education: Not appropriate   Ordered standard lab monitoring  .    Nutrition Goals  EN will meet 100% assessed needs  .      MONITORING AND EVALUATION:  Progress towards goals will be monitored and evaluated per protocol and Practice Guidelines      Unique Schultz RD  Pager [phone] (M-F)            [phone] (W/E & Hol)

## 2019-03-31 NOTE — PROGRESS NOTES
[facility]    Medicine Progress Note - Hospitalist Service       Date of Admission:  3/30/2019  Assessment & Plan   Keyon Farias is a 58 year old male admitted on 3/30/2019. He Presents with fevers, cough, witnessed emesis event suspicious for aspiration in the setting of recurrent aspiration pneumonias.     Right middle and lower lobe aspiration pneumonia versus pneumonitis with associated mild sepsis: Patient tachycardic, febrile, leukocytosis to 14.5.  Frequent recurrence of aspiration events despite J-tube feedings and aspiration precautions.  Historically, patient recovers quite quickly from febrile events following aspiration, potentially suggesting pneumonitis rather than pneumonia  - IV Zosyn given frequent hospitalizations and antibiotic exposure.   - Per patient's guardian, he is still completing a 10-day course of Augmentin from last hospitalization (she reports 2 tabs left, though should have completed 3/28)  - Oximetry, oxygen as needed - 3/31 AM, on RA with good sats  - Aspiration precautions including head of bed at 40 degrees with feedings and meds, nothing by mouth including medications  - Continue prior to admission albuterol nebulizer treatments and Mucomyst nebulizer treatments     Nutrition:  - Nutrition consulted for tube feeding assessment  - For now, Isosource 1.5 at 100 mL/h 7 AM to 7 PM as well as free water flushes of 150 mL every 4 hours as per prior dietary recommendations.  - Protonix solution every morning  - Feeding and medications only through J-tube  - Pharmacy consult for feeding tube medication interactions     Traumatic brain injury with sequelae of:  Aphasia, spastic paralysis:  - Wound consult for high risk of pressure injury    Dysphagia with recurrent aspiration events:  - Tube feedings as above and strict n.p.o. status    Seizure disorder:  - Continue prior to admission Tegretol, Briviact     Panhypopituitarism:  - Continue hydrocortisone 20 mg every  morning, 10 mg nightly.   - Patient is febrile, and has received stress dose steroids in the past.  Given prior rapid resolution of symptoms historically and normotension currently, holding on stress dose steroids at this time while closely monitoring.                Diabetes insipidus: Mild, historically has been on and off of DDAVP.  Not currently on DDAVP.  Follows with endocrinology through [facility].  Currently with normal serum sodium.  Note that patient is also no longer on sodium phosphate tablets.  - Trend intake and output                Hypothyroidism:  - Continue prior to admission levothyroxine                Hypogonadotropic hypogonadism:  - Resume prior to admission testosterone injections in the outpatient setting                Diet: NPO for Medical/Clinical Reasons Except for: No Exceptions  Adult Formula Drip Feeding: Continuous Isosource 1.5; Other - Specify in Comment; Goal Rate: 100/hr; mL/hr; From: 7:00 AM; 7:00 PM; Medication - Feeding Tube Flush Frequency: At least 15-30 mL water before and after medication administration and w...    DVT Prophylaxis: Enoxaparin (Lovenox) SQ  Lerma Catheter: not present  Code Status: Full Code      Disposition Plan   Expected discharge: 2 days possibly, likely back to prior living arrangement.    Entered: Washington Connors MD 03/31/2019, 12:44 PM       The patient's care was discussed with the Bedside Nurse and Patient.    Washington Connors MD  Hospitalist Service  [facility]    ______________________________________________________________________    Interval History   No new events since admission last night. Afebrile. Sats normal on RA. Continuing on iv antibiotic today. Patient nonverbal. Denies new complaints or pain.     Data reviewed today: I reviewed all medications, new labs and imaging results over the last 24 hours. I personally reviewed no images or EKG's today.    Physical Exam   Vital Signs: Temp: 98.6  F  (37  C) Temp src: Oral BP: 92/55 Pulse: 114 Heart Rate: 92 Resp: 16 SpO2: 95 % O2 Device: None (Room air)    Weight: 162 lbs 4.14 oz    Gen: NAD, awake, nonverbal  HEENT: Normocephalic, MMM  Resp: no crackles or wheezes, no increased work of resp  CV: S1S2 heard, RRR, no pedal edema  Abdo: soft, nontender, nondistended, bowel sounds present  Ext: calves nontender, well perfused  Neuro: awake and alert, nonverbal, answers yes/no with gestures, moving UE, visually tracks      Data
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
## 2019-03-31 NOTE — ED NOTES
Bed: ED24  Expected date:   Expected time:   Means of arrival:   Comments:  M1 58M  Fever/Vomitting

## 2019-03-31 NOTE — PROVIDER NOTIFICATION
MD Notification    Notified Person: MD    Notified Person Name: schaefer    Notification Date/Time:0402 3/31/19    Notification Interaction: phone    Purpose of Notification: tube feedings: shall I obtain a bag to begin at 7am or would you prefer nutrition see him first and dietary bring up the bags during day shift?     Orders Received: Will be okay to start at this rate at 7AM, go ahead and grab a bag. When nutrition sees him today, they can make adjustments as needed.     Comments:

## 2019-03-31 NOTE — ED PROVIDER NOTES
History     Chief Complaint:  Fever & Vomiting      HPI   History limited secondary to the patient being nonverbal. History provided by EMS report and review of the electronic medical record.    Keyon Farias is a 58 year old male with a complex medical history pertinent for recurrent aspiration pneumonia, traumatic brain injury with hemiplegia and dysphagia, and seizure disorder, who presents to the ED via EMS for evalutation of a fever and vomiting. The patient reportedly lives at home with his mother, who called EMS after she measured a temperature of 104.0 F and he began vomiting brown-colored emesis. Here in the ED, the patient presents initially febrile at 101.7 F. He is not sure if he has been coughing and is otherwise unable to provide further history. Of note, the patient was recently admitted from 3/9-3/12 for aspiration pneumonia and again from 3/14-3/18 for worsening dyspnea. He was treated with IV Zosyn at that time but is currently taking oral Augmentin for this.    Allergies:  Dilantin [Phenytoin Sodium]  Valproic Acid      Medications:    Mucomyst  Albuterol  Bacitracin  Briviact  Tegretol  Hydrocortisone  Levothyroxine  Melatonin  Protonix  Neutra-Phos  Florastor  Depotestosterone cypionate  Augmentin    Past Medical History:    Septic shock  Aspiration pneumonia  Epilepsy  PEG tube malfunction  Cardiac arrest  Acute respiratory failure with hypoxia  Necrotizing pancreatitis  DVT  TBI  GERD  Panhypopituitarism  Spastic hemiplegia  Thyroid disease  Stroke  UTI  Ventricular fibrillation    Past Surgical History:    Endoscopic upper GI ultrasound  EGD, necrosectomy  Reconstructive facial surgery  Gastro jejunostomy tube change  Laparoscopic appendectomy  Laparoscopic assisted gastrotomy tube insertion  Right hand repair  Tracheostomy  Vascular surgery    Family History:    No past pertinent family history.    Social History:  Smoking Status: Former smoker  Negative for alcohol use.  Negative for drug  "use.   Marital Status:  Single [1]     Review of Systems   Unable to perform ROS: Patient nonverbal   Constitutional: Positive for fever.   Gastrointestinal: Positive for vomiting.       Physical Exam     Patient Vitals for the past 24 hrs:   BP Temp Temp src Pulse Heart Rate Resp SpO2 Height Weight   03/30/19 2228 136/69 98.9  F (37.2  C) Oral 114 -- 15 95 % -- --   03/30/19 2100 133/77 -- -- 105 112 14 97 % -- --   03/30/19 2045 -- -- -- -- 111 10 96 % -- --   03/30/19 2030 -- -- -- -- 113 13 97 % -- --   03/30/19 2015 -- -- -- -- 113 (!) 32 97 % -- --   03/30/19 1931 126/62 101.7  F (38.7  C) Oral 111 -- 24 96 % 1.753 m (5' 9\") 72.6 kg (160 lb)        Physical Exam  Nursing note and vitals reviewed.    Constitutional:  Appears fairly comfortable.  Pleasant.  HENT:     Nose normal.  No discharge.      Oropharynx is clear and moist.  Eyes:    Conjunctivae are normal without injection.  Lymph:  No enlarged or tender cervical or submandibular lymph nodes.   Cardiovascular:  Normal rate, regular rhythm with normal S1 and S2.      Normal heart sounds and peripheral pulses 2+ and equal.       No murmur or ketan.  Pulmonary:  Effort normal and breath sounds clear to auscultation bilaterally        No respiratory distress.  No stridor.     No wheezes. No rales.        Coughing some.   GI:    Soft. No distension and no mass. No tenderness.  G-tube looks clean.     No rebound and no guarding. No flank pain.  No HSM.  Musculoskeletal:  Flaccid paraplegia of the lower extremities.     No extremity deformity.     No edema and no tenderness.     Neurological:   Alert . No cranial nerve deficit, no facial droop.  Moves his hands symmetrically.     Exhibits good muscle tone. Coordination normal.      GCS eye subscore is 4. GCS verbal subscore is 5.      GCS motor subscore is 6.   Skin:    Skin is warm and dry. No rash noted. No diaphoresis.      No erythema. No pallor.  No lesions.  Old tracheostomy scar noted.  Psychiatric: "   Patient is nonverbal secondary to TBI      Emergency Department Course     Imaging:  Radiographic findings were communicated with the family who voiced understanding of the findings.     XR Chest 2 views:   Shallow inspiration. Patchy opacity at the right lung base  has a similar appearance to the previous exam, and is suspicious for  persistent or recurrent pneumonia. The left lung is clear. No pleural  effusions. Pulmonary vascularity is within normal limits. As per radiology.     Laboratory:  CBC: WBC: 14.5, HGB: 12.9 (L), PLT: 233  CMP: Glucose 142 (H), Albumin 3.2 (L), o/w WNL (Creatinine: 0.82)    2000 Lactic acid: 1.7    UA with Microscopic: GLC 70 (A), pH 8.5 (H), Protein albumin 30 (A), o/w WNL    Urine culture: pending  Blood cultures (X2): pending     Interventions:  2013 LR 2127 mL IV bolus  2107 Zosyn 4.5 g IV  2155 Briviact 100 mg G tube  2143 Protonix 40 mg IV  2156 LR IV infusion     Emergency Department Course:  Nursing notes and vitals reviewed. (1926) I performed an exam of the patient as documented above.     IV inserted. Medicine administered as documented above. Blood drawn. This was sent to the lab for further testing, results above.    The patient was sent for a chest x-ray while in the emergency department, findings above.     The patient provided a catheterized urine sample here in the emergency department. This was sent for laboratory testing, findings above.      (2115) I rechecked the patient and discussed the results of his workup thus far with his mother.    (2127)  I consulted with Dr. Villavicencio of the hospitalist services. They are in agreement to accept the patient for admission.    Findings and plan explained to the mother who consents to admission. Discussed the patient with Dr. Villavicencio, who will admit the patient to an inpatient medical bed for further monitoring, evaluation, and treatment.    Impression & Plan      Medical Decision Making:  Keyon Farias is a 58 year old male who comes  in with a return of his fevers.  He has had no vomiting here but there was a report of dark emesis earlier.  He continues to cough.  His chest x-ray shows persistence of the right lower lobe infiltrate which was believed to be secondary to aspiration.  Labs were obtained, and his lactic acid came back normal at 1.7, white count is elevated at 14.5, and comprehensive panel was otherwise unremarkable.  His urine was unremarkable.  2 blood cultures were obtained and he was started on Zosyn 4.5 g IV.  I did give him his evening dose of his antiseizure medicine through his G-tube.  I also ordered Protonix 40 mg IV as his mom had noted dark emesis earlier today.  His hemoglobin has actually gone up slightly so there is no evidence that he has been losing significant amounts of blood.  The patient will be admitted for IV antibiotic therapy and awaiting his blood cultures.  I talked with Dr. Villavicencio, who will be admitting the patient.    Diagnosis:    ICD-10-CM    1. Pneumonia of right lower lobe due to infectious organism (H) J18.1 UA with Microscopic     Urine Culture Aerobic Bacterial    Likely aspiration       Disposition:  Admitted to Dr. Villavicencio, IV Zosyn.    Scribe Disclosure:  Jyo DYKES, am serving as a scribe on 3/30/2019 at 8:13 PM to personally document services performed by Katie Conley MD based on my observations and the provider's statements to me.         Joy Amor  3/30/2019    EMERGENCY DEPARTMENT       Katie Conley MD  03/30/19 [id number]

## 2019-03-31 NOTE — PROGRESS NOTES
DATE & TIME: 7803-2105  3/31/19   ORIENTATION: HECTOR; aphasia, nonverbal, past TBI   BEHAVIOR & AGGRESSION TOOL COLOR: GREEN  CIWA SCORE:  ABNL VS/O2: VSS, RA  MOBILITY: Ax2; T/R; hemiplegia on R side  PAIN MANAGMENT: denies pain.   DIET: Strict NPO; Tube feeding running at 100ml/hr. Discontinued at 1900 for night.    BOWEL/BLADDER: Incontinent; large, loose BM in pm.   ABNL LAB/BG:    DRAIN/DEVICES: J tube  TELEMETRY RHYTHM:  SKIN: Blanchable red coccyx; mepilex in place.   TESTS/PROCEDURES:  D/C DAY/GOALS/PLACE: Discharge in 2+ days   OTHER IMPORTANT INFO: HOB 40 degrees. Free water flushes programed Q4 hours. Strict I/O.

## 2019-03-31 NOTE — ED NOTES
"[facility]  ED Nurse Handoff Report    ED Chief complaint: Fever      ED Diagnosis:   Final diagnoses:   Pneumonia of right lower lobe due to infectious organism (H) - Likely aspiration       Code Status: Full Code    Allergies:   Allergies   Allergen Reactions     Dilantin [Phenytoin Sodium]      Valproic Acid      Toxicity c bone marrow suspension, elevated ammonia levels        Activity level - Baseline/Home:  Total Care    Activity Level - Current:   Total Care     Needed?: No    Isolation: Yes  Infection: Not Applicable  ESBL  MRSA  Bariatric?: No    Vital Signs:   Vitals:    03/30/19 2015 03/30/19 2030 03/30/19 2045 03/30/19 2100   BP:    133/77   Pulse:    105   Resp: (!) 32 13 10 14   Temp:       TempSrc:       SpO2: 97% 97% 96% 97%   Weight:       Height:           Cardiac Rhythm: ,        Pain level:      Is this patient confused?: Yes   Does this patient have a guardian?  Yes         If yes, is there guardianship documents in the Epic \"Code/ACP\" activity?  Yes         Guardian Notified?  Yes  [place] - Suicide Severity Rating Scale Completed?  Yes  If yes, what color did the patient score?  White    Patient Report: Initial Complaint: Pt here with fever and pneumonia  Focused Assessment: Pt has chronic pneumonia and being treated with antibiotics. Mother stated pt temperature was 104. Pt on arrival 101.7.Pt had a TBI and dependent on all cares. Pt has a peg tube and speaks very few words. Pt incontinent wearing briefs. Pt able to lift hips to hel;p with changing brief but when mother is around he won't help. Pt has a nonblanchable pressure ulcer on his coxxyx. pressuire meplix applied.   Tests Performed: labs, cultures xray, urine  Abnormal Results: see results  Treatments provided: Antibiotics, fluids    Family Comments: Mother very involved with pt.     OBS brochure/video discussed/provided to patient/family: N/A              Name of person given brochure if not patient: na   "            Relationship to patient: na    ED Medications:   Medications   lactated ringers infusion (not administered)   piperacillin-tazobactam (ZOSYN) 4.5 g vial to attach to  mL bag (4.5 g Intravenous New Bag 3/30/19 2107)   Brivaracetam (BRIVIACT) solution 100 mg (not administered)   pantoprazole (PROTONIX) 40 mg IV push injection (not administered)   lactated ringers BOLUS 2,127 mL (2,000 mLs Intravenous New Bag 3/30/19 2013)       Drips infusing?:  Yes    For the majority of the shift this patient was Green.   Interventions performed were none.    Severe Sepsis OR Septic Shock Diagnosis Present: No    To be done/followed up on inpatient unit:  Continue fluids, antibiotics and medications through peg tube, monitor for fever    ED NURSE PHONE NUMBER: [phone]

## 2019-03-31 NOTE — ED TRIAGE NOTES
Pt here from home via EMS after mother called with temperature of 104.0. Pt on arrival temp 101.7. Pt is being treated for aspiration pneumonia which he gets frequently.   Pt had antibiotic this AM but has been vomiting up brown . Pt had a TBI years prior.

## 2019-03-31 NOTE — PHARMACY-ADMISSION MEDICATION HISTORY
Admission medication history interview status for the 3/30/2019  admission is complete. See EPIC admission navigator for prior to admission medications     Medication history source reliability:Moderate    Actions taken by pharmacist (provider contacted, etc):None     Additional medication history information not noted on PTA med list : Pt was prescribed iron recently but hasn't started taking yet. Pt's mother stated that he has around 3 more doses of Augmentin left at home.    Medication reconciliation/reorder completed by provider prior to medication history? No    Time spent in this activity: 30 minutes    Prior to Admission medications    Medication Sig Last Dose Taking? Auth Provider   acetaminophen (TYLENOL) 500 MG tablet 1,000 mg by Oral or FT or NG tube route every 6 hours as needed for mild pain Past Month at PRN Yes Unknown, Entered By History   acetylcysteine (MUCOMYST) 20 % neb solution Take 2 mLs by nebulization 4 times daily 3/30/2019 at PM Yes Starr Champion MD   albuterol (PROVENTIL) (5 MG/ML) 0.5% neb solution Take 0.5 mLs (2.5 mg) by nebulization every 4 hours (while awake) 3/30/2019 at PM Yes Starr Champion MD   amoxicillin-clavulanate (AUGMENTIN) 875-125 MG tablet Take 1 tablet by mouth every 12 hours for 10 days 3/30/2019 at AM Yes Kane Mars MD   bacitracin ointment Apply topically daily To PEG site.   Yes Unknown, Entered By History   Brivaracetam (BRIVIACT) 10 MG/ML solution 100 mg by Oral or FT or NG tube route 2 times daily @0900 and 2100 3/30/2019 at 0900 Yes Reported, Patient   calcium carbonate 1250 (500 CA) MG/5ML SUSP suspension 5 mLs (1,250 mg) by Per J Tube route 3 times daily (with meals) 3/30/2019 at 1500 Yes Mariana Venegas MD   carBAMazepine (TEGRETOL) 100 MG/5ML suspension Take 150 mg by mouth every 6 hours At 06:00, 12:00, 18:00 and 24:00 for seizures 3/30/2019 at 1800 Yes Unknown, Entered By History   ferrous sulfate 220 (44 Fe) MG/5ML ELIX 220 mg by Per  Feeding Tube route daily  Yes Unknown, Entered By History   Guar Gum (FIBER MODULAR, NUTRISOURCE FIBER,) packet 1 packet by Per G Tube route daily 3/30/2019 at AM Yes Starr Champion MD   hydrocortisone (CORTEF) 5 MG tablet 10 mg by Oral or FT or NG tube route daily (with dinner) At 1500 3/30/2019 at 1500 Yes Unknown, Entered By History   hydrocortisone (CORTEF) 5 MG tablet 20 mg by Oral or FT or NG tube route every morning  3/30/2019 at AM Yes Unknown, Entered By History   levothyroxine (SYNTHROID/LEVOTHROID) 137 MCG tablet 137 mcg by Oral or FT or NG tube route daily 3/30/2019 at AM Yes Unknown, Entered By History   melatonin (MELATONIN) 1 MG/ML LIQD liquid 6 mg by Per NG tube route At Bedtime  3/29/2019 at PM Yes Unknown, Entered By History   Multiple Vitamins-Minerals (CENTRUM SILVER) per tablet Take 1 tablet by mouth daily Crush and feed via j-tube 3/30/2019 at AM Yes Unknown, Entered By History   pantoprazole (PROTONIX) 2 mg/mL SUSP suspension 40 mg by Per Feeding Tube route daily  3/29/2019 at Unknown time Yes Unknown, Entered By History   potassium & sodium phosphates (NEUTRA-PHOS) 280-160-250 MG Packet Take 1 packet by mouth 3 times daily 0900, 1500, 2100.  3/30/2019 at 1500 Yes Unknown, Entered By History   testosterone cypionate (DEPOTESTOTERONE CYPIONATE) 200 MG/ML injection Inject 76 mg into the muscle See Admin Instructions Every 2 weeks on Fridays   76 mg or 0.38 mL 3/21/2019 Yes Unknown, Entered By History   Vitamin D, Cholecalciferol, 1000 units TABS Take 2,000 Units by mouth every morning Crush and feed via j-tube 3/30/2019 at AM Yes Unknown, Entered By History   order for DME Equipment being ordered: Nebulizer   Starr Champion MD

## 2019-03-31 NOTE — PROGRESS NOTES
DATE & TIME: 1062-6310   3/31/19   ORIENTATION: HECTOR; aphasia, nonverbal, past TBI   BEHAVIOR & AGGRESSION TOOL COLOR: GREEN  CIWA SCORE:  ABNL VS/O2:  MOBILITY: Ax2; T/R; hemiplegia   PAIN MANAGMENT:   DIET: Strict NPO; Tube feedings   BOWEL/BLADDER: Incontinent   ABNL LAB/BG: K 4.1.   DRAIN/DEVICES: J tube  TELEMETRY RHYTHM:  SKIN: Blanchable red coccyx; mepilex in place   TESTS/PROCEDURES:  D/C DAY/GOALS/PLACE: Discharge in 2+ days   OTHER IMPORTANT INFO: HOB 40 degrees. Free water flushes Q4 hours. Strict I/O.

## 2019-04-01 LAB
ANION GAP SERPL CALCULATED.3IONS-SCNC: 8 MMOL/L (ref 3–14)
BACTERIA SPEC CULT: NO GROWTH
BUN SERPL-MCNC: 12 MG/DL (ref 7–30)
CALCIUM SERPL-MCNC: 8.1 MG/DL (ref 8.5–10.1)
CHLORIDE SERPL-SCNC: 104 MMOL/L (ref 94–109)
CO2 SERPL-SCNC: 25 MMOL/L (ref 20–32)
CREAT SERPL-MCNC: 0.87 MG/DL (ref 0.66–1.25)
ERYTHROCYTE [DISTWIDTH] IN BLOOD BY AUTOMATED COUNT: 15.6 % (ref 10–15)
GFR SERPL CREATININE-BSD FRML MDRD: >90 ML/MIN/{1.73_M2}
GLUCOSE SERPL-MCNC: 128 MG/DL (ref 70–99)
HCT VFR BLD AUTO: 32.5 % (ref 40–53)
HGB BLD-MCNC: 10.7 G/DL (ref 13.3–17.7)
Lab: NORMAL
MAGNESIUM SERPL-MCNC: 2.1 MG/DL (ref 1.6–2.3)
MCH RBC QN AUTO: 27.4 PG (ref 26.5–33)
MCHC RBC AUTO-ENTMCNC: 32.9 G/DL (ref 31.5–36.5)
MCV RBC AUTO: 83 FL (ref 78–100)
PHOSPHATE SERPL-MCNC: 2.5 MG/DL (ref 2.5–4.5)
PLATELET # BLD AUTO: 190 10E9/L (ref 150–450)
POTASSIUM SERPL-SCNC: 3.3 MMOL/L (ref 3.4–5.3)
POTASSIUM SERPL-SCNC: 4.6 MMOL/L (ref 3.4–5.3)
RBC # BLD AUTO: 3.9 10E12/L (ref 4.4–5.9)
SODIUM SERPL-SCNC: 137 MMOL/L (ref 133–144)
SPECIMEN SOURCE: NORMAL
WBC # BLD AUTO: 9 10E9/L (ref 4–11)

## 2019-04-01 PROCEDURE — 36415 COLL VENOUS BLD VENIPUNCTURE: CPT | Performed by: HOSPITALIST

## 2019-04-01 PROCEDURE — 85027 COMPLETE CBC AUTOMATED: CPT | Performed by: INTERNAL MEDICINE

## 2019-04-01 PROCEDURE — 36415 COLL VENOUS BLD VENIPUNCTURE: CPT | Performed by: INTERNAL MEDICINE

## 2019-04-01 PROCEDURE — 25000125 ZZHC RX 250: Performed by: INTERNAL MEDICINE

## 2019-04-01 PROCEDURE — G0463 HOSPITAL OUTPT CLINIC VISIT: HCPCS | Performed by: NURSE PRACTITIONER

## 2019-04-01 PROCEDURE — 84100 ASSAY OF PHOSPHORUS: CPT | Performed by: INTERNAL MEDICINE

## 2019-04-01 PROCEDURE — 94640 AIRWAY INHALATION TREATMENT: CPT | Mod: 76

## 2019-04-01 PROCEDURE — 12000000 ZZH R&B MED SURG/OB

## 2019-04-01 PROCEDURE — 94640 AIRWAY INHALATION TREATMENT: CPT

## 2019-04-01 PROCEDURE — 80048 BASIC METABOLIC PNL TOTAL CA: CPT | Performed by: INTERNAL MEDICINE

## 2019-04-01 PROCEDURE — 99232 SBSQ HOSP IP/OBS MODERATE 35: CPT | Performed by: HOSPITALIST

## 2019-04-01 PROCEDURE — A9270 NON-COVERED ITEM OR SERVICE: HCPCS | Mod: GY | Performed by: INTERNAL MEDICINE

## 2019-04-01 PROCEDURE — 84132 ASSAY OF SERUM POTASSIUM: CPT | Performed by: HOSPITALIST

## 2019-04-01 PROCEDURE — 25000128 H RX IP 250 OP 636: Performed by: INTERNAL MEDICINE

## 2019-04-01 PROCEDURE — 40000275 ZZH STATISTIC RCP TIME EA 10 MIN

## 2019-04-01 PROCEDURE — 25000132 ZZH RX MED GY IP 250 OP 250 PS 637: Mod: GY | Performed by: INTERNAL MEDICINE

## 2019-04-01 PROCEDURE — 27210429 ZZH NUTRITION PRODUCT INTERMEDIATE LITER

## 2019-04-01 PROCEDURE — 83735 ASSAY OF MAGNESIUM: CPT | Performed by: INTERNAL MEDICINE

## 2019-04-01 RX ORDER — METOCLOPRAMIDE HYDROCHLORIDE 5 MG/ML
10 INJECTION INTRAMUSCULAR; INTRAVENOUS EVERY 6 HOURS PRN
Status: DISCONTINUED | OUTPATIENT
Start: 2019-04-01 | End: 2019-04-02 | Stop reason: HOSPADM

## 2019-04-01 RX ORDER — METOCLOPRAMIDE 10 MG/1
10 TABLET ORAL EVERY 6 HOURS PRN
Status: DISCONTINUED | OUTPATIENT
Start: 2019-04-01 | End: 2019-04-02 | Stop reason: HOSPADM

## 2019-04-01 RX ADMIN — CALCIUM CARBONATE 1250 MG: 1250 SUSPENSION ORAL at 21:34

## 2019-04-01 RX ADMIN — ALBUTEROL SULFATE 2.5 MG: 2.5 SOLUTION RESPIRATORY (INHALATION) at 07:25

## 2019-04-01 RX ADMIN — PIPERACILLIN SODIUM,TAZOBACTAM SODIUM 4.5 G: 4; .5 INJECTION, POWDER, FOR SOLUTION INTRAVENOUS at 02:33

## 2019-04-01 RX ADMIN — VITAMIN D, TAB 1000IU (100/BT) 2000 UNITS: 25 TAB at 08:35

## 2019-04-01 RX ADMIN — MULTIVITAMIN 15 ML: LIQUID ORAL at 08:35

## 2019-04-01 RX ADMIN — ALBUTEROL SULFATE 2.5 MG: 2.5 SOLUTION RESPIRATORY (INHALATION) at 22:45

## 2019-04-01 RX ADMIN — CALCIUM CARBONATE 1250 MG: 1250 SUSPENSION ORAL at 14:50

## 2019-04-01 RX ADMIN — ONDANSETRON 4 MG: 2 INJECTION INTRAMUSCULAR; INTRAVENOUS at 13:48

## 2019-04-01 RX ADMIN — ACETYLCYSTEINE 2 ML: 200 SOLUTION ORAL; RESPIRATORY (INHALATION) at 19:07

## 2019-04-01 RX ADMIN — POTASSIUM & SODIUM PHOSPHATES POWDER PACK 280-160-250 MG 1 PACKET: 280-160-250 PACK at 14:50

## 2019-04-01 RX ADMIN — ACETYLCYSTEINE 4 ML: 200 SOLUTION ORAL; RESPIRATORY (INHALATION) at 11:36

## 2019-04-01 RX ADMIN — Medication 1 PACKET: at 08:36

## 2019-04-01 RX ADMIN — CARBAMAZEPINE 150 MG: 100 SUSPENSION ORAL at 06:36

## 2019-04-01 RX ADMIN — ENOXAPARIN SODIUM 40 MG: 40 INJECTION SUBCUTANEOUS at 21:36

## 2019-04-01 RX ADMIN — HYDROCORTISONE 10 MG: 10 TABLET ORAL at 14:50

## 2019-04-01 RX ADMIN — MELATONIN 6 MG: 3 TAB ORAL at 21:37

## 2019-04-01 RX ADMIN — CARBAMAZEPINE 150 MG: 100 SUSPENSION ORAL at 17:05

## 2019-04-01 RX ADMIN — BRIVARACETAM 100 MG: 10 SOLUTION ORAL at 21:36

## 2019-04-01 RX ADMIN — PIPERACILLIN SODIUM,TAZOBACTAM SODIUM 4.5 G: 4; .5 INJECTION, POWDER, FOR SOLUTION INTRAVENOUS at 08:34

## 2019-04-01 RX ADMIN — MINERAL SUPPLEMENT IRON 300 MG / 5 ML STRENGTH LIQUID 100 PER BOX UNFLAVORED 300 MG: at 08:35

## 2019-04-01 RX ADMIN — ALBUTEROL SULFATE 2.5 MG: 2.5 SOLUTION RESPIRATORY (INHALATION) at 15:59

## 2019-04-01 RX ADMIN — PIPERACILLIN SODIUM,TAZOBACTAM SODIUM 4.5 G: 4; .5 INJECTION, POWDER, FOR SOLUTION INTRAVENOUS at 14:51

## 2019-04-01 RX ADMIN — ACETYLCYSTEINE 4 ML: 200 SOLUTION ORAL; RESPIRATORY (INHALATION) at 07:25

## 2019-04-01 RX ADMIN — POTASSIUM & SODIUM PHOSPHATES POWDER PACK 280-160-250 MG 1 PACKET: 280-160-250 PACK at 21:37

## 2019-04-01 RX ADMIN — POTASSIUM CHLORIDE 20 MEQ: 1.5 POWDER, FOR SOLUTION ORAL at 11:49

## 2019-04-01 RX ADMIN — ALBUTEROL SULFATE 2.5 MG: 2.5 SOLUTION RESPIRATORY (INHALATION) at 11:36

## 2019-04-01 RX ADMIN — LEVOTHYROXINE SODIUM 137 MCG: 112 TABLET ORAL at 06:35

## 2019-04-01 RX ADMIN — PIPERACILLIN SODIUM,TAZOBACTAM SODIUM 4.5 G: 4; .5 INJECTION, POWDER, FOR SOLUTION INTRAVENOUS at 21:34

## 2019-04-01 RX ADMIN — ALBUTEROL SULFATE 2.5 MG: 2.5 SOLUTION RESPIRATORY (INHALATION) at 00:10

## 2019-04-01 RX ADMIN — CARBAMAZEPINE 150 MG: 100 SUSPENSION ORAL at 00:30

## 2019-04-01 RX ADMIN — ALBUTEROL SULFATE 2.5 MG: 2.5 SOLUTION RESPIRATORY (INHALATION) at 19:07

## 2019-04-01 RX ADMIN — POTASSIUM & SODIUM PHOSPHATES POWDER PACK 280-160-250 MG 1 PACKET: 280-160-250 PACK at 08:35

## 2019-04-01 RX ADMIN — ACETYLCYSTEINE 2 ML: 200 SOLUTION ORAL; RESPIRATORY (INHALATION) at 15:59

## 2019-04-01 RX ADMIN — CALCIUM CARBONATE 1250 MG: 1250 SUSPENSION ORAL at 09:53

## 2019-04-01 RX ADMIN — CARBAMAZEPINE 150 MG: 100 SUSPENSION ORAL at 11:49

## 2019-04-01 RX ADMIN — Medication 40 MG: at 08:35

## 2019-04-01 RX ADMIN — POTASSIUM CHLORIDE 40 MEQ: 1.5 POWDER, FOR SOLUTION ORAL at 09:53

## 2019-04-01 RX ADMIN — BACITRACIN: 500 OINTMENT TOPICAL at 08:57

## 2019-04-01 RX ADMIN — HYDROCORTISONE 20 MG: 20 TABLET ORAL at 08:35

## 2019-04-01 RX ADMIN — BRIVARACETAM 100 MG: 10 SOLUTION ORAL at 09:01

## 2019-04-01 ASSESSMENT — ACTIVITIES OF DAILY LIVING (ADL)
ADLS_ACUITY_SCORE: 45

## 2019-04-01 ASSESSMENT — MIFFLIN-ST. JEOR: SCORE: 1583

## 2019-04-01 NOTE — PLAN OF CARE
DATE & TIME: 4/1/19 0554  ORIENTATION: A/Ox4, nonverbal  BEHAVIOR & AGGRESSION TOOL COLOR: Green  MOBILITY: Lift, reposition q2h  PAIN MANAGMENT: Denies  DIET: NPO, tube feeding [id number]  BOWEL/BLADDER: Incontinent  DRAIN/DEVICES: GJ tube  SKIN: Reddened coccyx, Mepilex in place. Reddened groin, Miconazole powder ordered  D/C DAY/GOALS/PLACE: Discharge home pending progress.  OTHER IMPORTANT INFO: Lung sounds clear

## 2019-04-01 NOTE — PROGRESS NOTES
[facility]    Medicine Progress Note - Hospitalist Service       Date of Admission:  3/30/2019  Assessment & Plan      Keyon Farias is a 58 year old male admitted on 3/30/2019. He Presents with fevers, cough, witnessed emesis event suspicious for aspiration in the setting of recurrent aspiration pneumonias.     Right middle and lower lobe aspiration pneumonia versus pneumonitis with associated mild sepsis: Patient tachycardic, febrile, leukocytosis to 14.5.  Frequent recurrence of aspiration events despite J-tube feedings and aspiration precautions.  Historically, patient recovers quite quickly from febrile events following aspiration, potentially suggesting pneumonitis rather than pneumonia  - IV Zosyn continued given frequent hospitalizations and antibiotic exposure.   - Per patient's guardian, he is still completing a 10-day course of Augmentin from last hospitalization (she reports 2 tabs left, though should have completed 3/28)  - Oximetry, oxygen as needed - 3/31 AM, on RA with good sats  - Aspiration precautions including head of bed at 40 degrees with feedings and meds, nothing by mouth including medications  - Continue PTA albuterol nebulizer treatments and Mucomyst nebulizer treatments  - 4/1 - Continues to be afebrile, WBC pending, will request consultation from infectious disease given recurrent issue and multiple antibiotic regimens; was previously on zosyn-->augmentin just prior to admission.   - in chart review, appears that much of what can be done to avoid aspiration is being done - ensuring angle of bed especially during TF, only using J tube for feeds and meds, etc.      Nutrition:  - Nutrition consulted for tube feeding assessment  - For now, Isosource 1.5 at 100 mL/h 7 AM to 7 PM as well as free water flushes of 150 mL every 4 hours as per prior dietary recommendations.  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 status    Seizure disorder:  - Continue prior to admission Tegretol, Briviact     Panhypopituitarism:  - Continue hydrocortisone 20 mg every morning, 10 mg nightly.   - Patient is febrile, and has received stress dose steroids in the past.  Given prior rapid resolution of symptoms historically and normotension currently, holding on stress dose steroids at this time while closely monitoring.                Diabetes insipidus: Mild, historically has been on and off of DDAVP.  Not currently on DDAVP.  Follows with endocrinology through [facility].  Currently with normal serum sodium.  Note that patient is also no longer on sodium phosphate tablets.  - Trend intake and output                Hypothyroidism:  - Continue prior to admission levothyroxine                Hypogonadotropic hypogonadism:  - Resume prior to admission testosterone injections in the outpatient setting                Diet: NPO for Medical/Clinical Reasons Except for: No Exceptions  Adult Formula Drip Feeding: Continuous Isosource 1.5; Other - Specify in Comment; Goal Rate: 100/hr; mL/hr; From: 7:00 AM; 7:00 PM; Medication - Feeding Tube Flush Frequency: At least 15-30 mL water before and after medication administration and w...    DVT Prophylaxis: Enoxaparin (Lovenox) SQ  Lerma Catheter: not present  Code Status: Full Code      Disposition Plan   Expected discharge: Pending continued improvement, maintaining afebrile and RA. Anticipate return to prior living arrangement.     Entered: Washington Connors MD 04/01/2019, 8:39 AM       The patient's care was discussed with the Bedside Nurse and Patient.    Washington Connors MD  Hospitalist Service  [facility]  "Hospital    ______________________________________________________________________    Interval History   No new events overnight. Nonverbal at baseline, but seems to be happy this morning. Smiling and pointing myself and RN staff, kind of saying \"hi.\" Will attempt to speak with his mother later today.     Data reviewed today: I reviewed all medications, new labs and imaging results over the last 24 hours. I personally reviewed no images or EKG's today.    Physical Exam   Vital Signs: Temp: 98.4  F (36.9  C) Temp src: Oral BP: 108/62 Pulse: 55 Heart Rate: 81 Resp: 18 SpO2: 94 % O2 Device: None (Room air)    Weight: 157 lbs 10.06 oz    Gen: NAD, seems happy  HEENT: atraumatic, MMM, tracks at will visually  Resp: no crackles or wheezes, no increased work of resp  CV: S1S2 heard, reg rhythm, reg rate, no pedal edema  Abdo: soft, nontender, nondistended, bowel sounds present  Ext: well perfused  Neuro: AA, tracks, nonverbal at baseline, no new changes appreciated      Data   Recent Labs   Lab 03/30/19 2000   WBC 14.5*   HGB 12.9*   MCV 84         POTASSIUM 4.1   CHLORIDE 101   CO2 29   BUN 14   CR 0.82   ANIONGAP 9   STEVE 8.8   *   ALBUMIN 3.2*   PROTTOTAL 8.6   BILITOTAL 0.2   ALKPHOS 107   ALT 40   AST 22     No results found for this or any previous visit (from the past 24 hour(s)).  "

## 2019-04-01 NOTE — CONSULTS
Consult Date:  04/01/2019      INFECTIOUS  DISEASE CONSULTATION      REFERRING PHYSICIAN:  Dr. Washington Connors      LOCATIONS:  [facility], room 610.      IMPRESSION:   1.  A 58-year-old male with acute fever and respiratory symptoms, very likely aspiration event, history of prior aspiration pneumonia and this episode behaving like multiple others with rapid improvement occurring.  No clear major infection symptoms.  Possibly primarily chemical pneumonitis.   2.  History of numerous historical aspiration pneumonia events, some with major sepsis, historically Pseudomonas colonization, but not recently.  In the past MRSA as well.  Aggressive interventions already being done for recurrent aspiration.   3.  Traumatic brain injury, aphasia, and spastic paralysis.   4.  Seizure disorder.   5.  Methicillin-resistant Staphylococcus aureus and vancomycin-resistant Enterococci colonization.      RECOMMENDATIONS:   1.  Agree with Dr. Connors, behaving like several prior episodes.  Primarily a chemical pneumonitis event rather than a true infection.  Would continue Zosyn while he is here, but as soon as anytime, I think it is okay to switch to oral antibiotics with 1 week of oral Augmentin.   2.  Already doing maximum interventions to try to prevent aspiration from occurring.  No antibiotic strategy that is effective for this situation.      HISTORY:  This 58-year-old male is seen in consultation due to presumed aspiration pneumonia.  The patient is well known to us from multiple prior events.  He has a history of traumatic brain injury and seizure disorder and has recurrent episodes of what appear to be aspiration pneumonia.  Some of these have been well documented and included major respiratory symptoms and sepsis.  Several have had cultures positive for things like MRSA and Pseudomonas in the past.  He has also though had several episodes where he rapidly improves after the initial sepsis and subsequent to that  does well, not clearly having major infection.  The current episode is behaving like that.  He had acute fever and some respiratory symptoms, but all of those have rapidly improved without positive cultures.      PAST MEDICAL HISTORY:  Traumatic brain injury and spastic paraplegia, history of recurrent aspiration events, history of some significant pneumonias, including Pseudomonas in the past.      SOCIAL AND FAMILY HISTORY:  Extensive healthcare contact and multiple known resistant pathogens, No active alcohol or tobacco use.      MEDICATIONS:  As listed.      REVIEW OF SYSTEMS:  Unobtainable.      PHYSICAL EXAMINATION:   GENERAL:  The patient looks like his usual self.  He does interact to a degree, gives a thumbs up.  Does not really answer questions  per se.  Mentation seems at baseline.  He does not look toxic or ill at all.   VITAL SIGNS:  Pulse of 70, respiratory rate 16 and unlabored, not requiring oxygen.   HEENT:  No thrush or intraoral lesions.  Pupils reactive.   NECK:  Supple and nontender.   HEART AND LUNGS:  Unremarkable.   ABDOMEN:  Soft and nontender.   EXTREMITIES:  Paraplegia-type symptoms and contractures.  No major active wounds that I can see, but did not fully turn him.      LABORATORY DATA:  No positive cultures.  Fever has completely resolved.  Mild infiltrate on x-ray.  White blood cell count initially 14,500, now down to 9000.      Thank you very much for consultation.  I will follow the patient with you.         SAMEER MITTAL MD             D: 2019   T: 2019   MT: [place]OL[place]      Name:     BERT BARAJAS   MRN:      [id number]        Account:       [id number]   :      1960           Consult Date:  2019      Document: [id number]       cc: Washington Gomez MD

## 2019-04-01 NOTE — PLAN OF CARE
ORIENTATION: Alert, nonverbal  VITALS: VSS on 2 LPM NC- wean as able  BEHAVIOR & AGGRESSION TOOL COLOR: Green  MOBILITY: Lift, reposition q2h  PAIN MANAGMENT: Denies  DIET: NPO, tube feeding [id number]  BOWEL/BLADDER: Incontinent  DRAIN/DEVICES: GJ tube-using J tube for meds/feeds  SKIN: Reddened coccyx, mepilex changed, WOC saw patient today. Reddened groin, miconazole powder applied.  D/C DAY/GOALS/PLACE: Discharge home pending progress, ID consult today  OTHER IMPORTANT INFO: Lung sounds diminished this AM. More coarse/wet with frequent productive cough this afternoon; suctioned to assist in secretions. Emesis during x1 green/clear, given zofran. Sats 89% before suctioning, placed on 2 LPM NC with sats around 92%. Continue IV zosyn and nebs. HOB to remain >40 degrees at all times

## 2019-04-01 NOTE — PROGRESS NOTES
[facility] Nurse Inpatient Wound Assessment     Initial Assessment of wound(s) on pt's:   Coccyx         Data:   Patient History:      Per MD note: Patient  is a 58 year old male with hx of for TBI with R side hemiplegia, dysphagia 2/2 TBI s/p GJ tube, seizure disorder, GERD, hypothyroidism, history of MRSA/VRE admitted on 3/9/2019.        Ricci Risk Assessment  Sensory Perception: 2-->very limited    Moisture: 3-->occasionally moist   Activity: 1-->bedfast     Mobility: 2-->very limited   Nutrition: 2-->probably inadequate   Ricci Score: 12     ? Positioning: Pillows  ? Mattress:  Standard , Atmos Air mattress    Moisture Management:  Incontinence Protocol and Diaper    Current Diet / Nutrition:     ? Orders Placed This Encounter         Labs:               Recent Labs   Lab Test 03/11/19  0724   03/09/19 2000 01/31/19  1324   11/22/18  1438   02/07/17  0452   ALBUMIN  --   --  3.5   < > 2.9*   < >  --    < >  --    HGB 10.5*   < > 13.9   < > 12.0*   < >  --    < > 9.4*   INR  --   --   --   --   --   --   --   --  1.27*   WBC 7.5   < > 21.5*   < > 13.6*   < >  --    < > 11.7*   A1C  --   --   --   --   --   --  6.3*   < >  --    CRP  --   --   --   --  71.1*  --   --   --   --     < > = values in this interval not displayed.         Wound Assessment (location):   Coccyx   Wound History:  Pt is  well-known to [facility] service, seen two weeks ago for coccyx issues. Able to turn with assist of one. Non-verbal.      Wound Base: open areas have resolved, easily blanchable mildly red tissue.   ? Palpation of the area:  normal  ? Color: pink  ? Temperature  normal     Note pictures at last admissions below.       3-11-19 [facility] photo - coccyx       2-28-19               Intervention:     Patient's chart evaluated.      Coccyx area assessed    Wound Care: none needed    Discussed plan of care with nurse      Assessment:           Coccyx with intact skin. No longer having loose stools.           Plan:      Nursing to notify the Provider(s) and re-consult the WOC Nurse if area deteriorates or if the wound care plan needs reevaluation.        WOC Nurse will return: if re-consulted.

## 2019-04-01 NOTE — CONSULTS
ID consult dictated IMP 1 59 yo male acte sepsis, fever, very likely another aspiration episode, rapidly better agree with Dr Connors likely chemical not infectious    REC zosyn while here but 7 days augmentin any time, already multiple interventions for aspiration, continue

## 2019-04-02 VITALS
BODY MASS INDEX: 21.5 KG/M2 | WEIGHT: 158.73 LBS | HEIGHT: 72 IN | RESPIRATION RATE: 18 BRPM | HEART RATE: 55 BPM | OXYGEN SATURATION: 93 % | DIASTOLIC BLOOD PRESSURE: 56 MMHG | TEMPERATURE: 97.6 F | SYSTOLIC BLOOD PRESSURE: 102 MMHG

## 2019-04-02 PROBLEM — K21.9 ACID REFLUX: Status: ACTIVE | Noted: 2019-04-02

## 2019-04-02 LAB
ANION GAP SERPL CALCULATED.3IONS-SCNC: 6 MMOL/L (ref 3–14)
BUN SERPL-MCNC: 12 MG/DL (ref 7–30)
CALCIUM SERPL-MCNC: 8.3 MG/DL (ref 8.5–10.1)
CHLORIDE SERPL-SCNC: 104 MMOL/L (ref 94–109)
CO2 SERPL-SCNC: 27 MMOL/L (ref 20–32)
CREAT SERPL-MCNC: 0.99 MG/DL (ref 0.66–1.25)
ERYTHROCYTE [DISTWIDTH] IN BLOOD BY AUTOMATED COUNT: 15.7 % (ref 10–15)
GFR SERPL CREATININE-BSD FRML MDRD: 85 ML/MIN/{1.73_M2}
GLUCOSE SERPL-MCNC: 160 MG/DL (ref 70–99)
HCT VFR BLD AUTO: 33 % (ref 40–53)
HGB BLD-MCNC: 10.7 G/DL (ref 13.3–17.7)
MCH RBC QN AUTO: 27.2 PG (ref 26.5–33)
MCHC RBC AUTO-ENTMCNC: 32.4 G/DL (ref 31.5–36.5)
MCV RBC AUTO: 84 FL (ref 78–100)
PLATELET # BLD AUTO: 188 10E9/L (ref 150–450)
POTASSIUM SERPL-SCNC: 3.6 MMOL/L (ref 3.4–5.3)
RBC # BLD AUTO: 3.94 10E12/L (ref 4.4–5.9)
SODIUM SERPL-SCNC: 137 MMOL/L (ref 133–144)
WBC # BLD AUTO: 10.1 10E9/L (ref 4–11)

## 2019-04-02 PROCEDURE — A9270 NON-COVERED ITEM OR SERVICE: HCPCS | Mod: GY | Performed by: INTERNAL MEDICINE

## 2019-04-02 PROCEDURE — 25000132 ZZH RX MED GY IP 250 OP 250 PS 637: Mod: GY | Performed by: INTERNAL MEDICINE

## 2019-04-02 PROCEDURE — 25000128 H RX IP 250 OP 636: Performed by: INTERNAL MEDICINE

## 2019-04-02 PROCEDURE — 40000275 ZZH STATISTIC RCP TIME EA 10 MIN

## 2019-04-02 PROCEDURE — 36415 COLL VENOUS BLD VENIPUNCTURE: CPT | Performed by: HOSPITALIST

## 2019-04-02 PROCEDURE — 99239 HOSP IP/OBS DSCHRG MGMT >30: CPT | Performed by: HOSPITALIST

## 2019-04-02 PROCEDURE — 80048 BASIC METABOLIC PNL TOTAL CA: CPT | Performed by: HOSPITALIST

## 2019-04-02 PROCEDURE — 94640 AIRWAY INHALATION TREATMENT: CPT | Mod: 76

## 2019-04-02 PROCEDURE — 25000125 ZZHC RX 250: Performed by: INTERNAL MEDICINE

## 2019-04-02 PROCEDURE — 94640 AIRWAY INHALATION TREATMENT: CPT

## 2019-04-02 PROCEDURE — 85027 COMPLETE CBC AUTOMATED: CPT | Performed by: HOSPITALIST

## 2019-04-02 RX ORDER — POLYETHYLENE GLYCOL 3350 17 G/17G
17 POWDER, FOR SOLUTION ORAL DAILY PRN
Status: DISCONTINUED | OUTPATIENT
Start: 2019-04-02 | End: 2019-04-02 | Stop reason: HOSPADM

## 2019-04-02 RX ADMIN — PIPERACILLIN SODIUM,TAZOBACTAM SODIUM 4.5 G: 4; .5 INJECTION, POWDER, FOR SOLUTION INTRAVENOUS at 08:26

## 2019-04-02 RX ADMIN — Medication 1 PACKET: at 08:26

## 2019-04-02 RX ADMIN — ALBUTEROL SULFATE 2.5 MG: 2.5 SOLUTION RESPIRATORY (INHALATION) at 12:43

## 2019-04-02 RX ADMIN — BRIVARACETAM 100 MG: 10 SOLUTION ORAL at 08:50

## 2019-04-02 RX ADMIN — POTASSIUM & SODIUM PHOSPHATES POWDER PACK 280-160-250 MG 1 PACKET: 280-160-250 PACK at 08:26

## 2019-04-02 RX ADMIN — HYDROCORTISONE 10 MG: 10 TABLET ORAL at 14:23

## 2019-04-02 RX ADMIN — PIPERACILLIN SODIUM,TAZOBACTAM SODIUM 4.5 G: 4; .5 INJECTION, POWDER, FOR SOLUTION INTRAVENOUS at 02:55

## 2019-04-02 RX ADMIN — CALCIUM CARBONATE 1250 MG: 1250 SUSPENSION ORAL at 14:24

## 2019-04-02 RX ADMIN — CALCIUM CARBONATE 1250 MG: 1250 SUSPENSION ORAL at 08:26

## 2019-04-02 RX ADMIN — MINERAL SUPPLEMENT IRON 300 MG / 5 ML STRENGTH LIQUID 100 PER BOX UNFLAVORED 300 MG: at 08:26

## 2019-04-02 RX ADMIN — CARBAMAZEPINE 150 MG: 100 SUSPENSION ORAL at 06:37

## 2019-04-02 RX ADMIN — POTASSIUM & SODIUM PHOSPHATES POWDER PACK 280-160-250 MG 1 PACKET: 280-160-250 PACK at 14:23

## 2019-04-02 RX ADMIN — HYDROCORTISONE 20 MG: 20 TABLET ORAL at 08:26

## 2019-04-02 RX ADMIN — BACITRACIN: 500 OINTMENT TOPICAL at 08:25

## 2019-04-02 RX ADMIN — VITAMIN D, TAB 1000IU (100/BT) 2000 UNITS: 25 TAB at 08:25

## 2019-04-02 RX ADMIN — CARBAMAZEPINE 150 MG: 100 SUSPENSION ORAL at 12:38

## 2019-04-02 RX ADMIN — ACETYLCYSTEINE 2 ML: 200 SOLUTION ORAL; RESPIRATORY (INHALATION) at 12:42

## 2019-04-02 RX ADMIN — MULTIVITAMIN 15 ML: LIQUID ORAL at 08:26

## 2019-04-02 RX ADMIN — CARBAMAZEPINE 150 MG: 100 SUSPENSION ORAL at 00:14

## 2019-04-02 RX ADMIN — ALBUTEROL SULFATE 2.5 MG: 2.5 SOLUTION RESPIRATORY (INHALATION) at 09:06

## 2019-04-02 RX ADMIN — LEVOTHYROXINE SODIUM 137 MCG: 112 TABLET ORAL at 06:37

## 2019-04-02 RX ADMIN — Medication 40 MG: at 08:26

## 2019-04-02 ASSESSMENT — ACTIVITIES OF DAILY LIVING (ADL)
ADLS_ACUITY_SCORE: 45

## 2019-04-02 ASSESSMENT — MIFFLIN-ST. JEOR: SCORE: 1588

## 2019-04-02 NOTE — CONSULTS
Care Transition Initial Assessment - RN        Conversed with Dr Connors and pt's mother and Guardian Savannah    DATA   Active Problems:    Aspiration pneumonia (H)    Acid reflux          Contact information and PCP information verified: Yes  Lives With: parent(s)      Insurance concerns: No Insurance issues identified  ASSESSMENT  Patient currently receives the following services:  24 hr care        Identified issues/concerns regarding health management:Patient currently has the following services:  [facility] RN plus 12hr RN coverage 7am-7pm through [facility] ([phone]) and 12hr PCA for the adarsh and night hours through [facility] ([phone]).  His mother is one of his PCAs.  Writer has contacted both agencies and they both plan to have the support staff in place.  Pt receives tube feeding supplies from [facility].     Savannah requested stretcher transport through [email or web address].  Transport has been set up for 1500 today.  RN caring for pt is aware.  Savannah noted pt has a bag of clothes here to wear home, and wanted Nsg to know she forgot his socks.    PLAN  Financial costs for the patient include:NA    Patient/family is agreeable to the plan?  Yes: Home today  PCP follow up 4/9/19   .  Patient anticipates needs for home equipment: No  Plan/Disposition: Home     Care  (CTS) will continue to follow as needed.

## 2019-04-02 NOTE — DISCHARGE INSTRUCTIONS
[facility] will resume services.  Their phone number is [phone].  [facility] and [facility] have been contacted.

## 2019-04-02 NOTE — PLAN OF CARE
ORIENTATION: Alert, nonverbal  VITALS: Temp max 99.9 all other VSS, on RA.  BEHAVIOR & AGGRESSION TOOL COLOR: Green  MOBILITY: Lift, T/R q2h  PAIN MANAGMENT: Denies  DIET: NPO, tube feeding [id number]  BOWEL/BLADDER: Incontinent  DRAIN/DEVICES: GJ tube-using J tube for meds/feeds  SKIN: Reddened coccyx, mepilex CDI. Reddened groin, miconazole powder applied.  D/C DAY/GOALS/PLACE: Discharge pending progress.  OTHER IMPORTANT INFO:LS diminished, freq congested cough.  Carlo nebs. Continue IV zosyn. HOB to remain >40 degrees at all times.  ID following.  Contact Iso maintained.

## 2019-04-02 NOTE — PROVIDER NOTIFICATION
Dr. Connors paged regarding respiratory status. Pt coughing, not tolerating suctioning. Sats around 90%. Afebrile today. Still ok to discharge home with home care per MD.

## 2019-04-02 NOTE — PLAN OF CARE
HECTOR orientation, nonverbal. T/R q2h. Incontinent. Protective mepilex on coccyx for blanchable erythema. Productive cough, LS diminished. No signs/symptoms of pain. HOB 40 degrees. NPO. All meds/feed through J tube. TF clamped at 1445 for discharge. VSS ex sats 90% at MD goldie aware and ok with discharge home with home care . Discharge discussed with pt's motherSavannah & instructions sent with patient.

## 2019-04-02 NOTE — PROGRESS NOTES
[facility]  Patient is currently open to home care services with [facility]. The patient is currently receiving        Skilled Nursing services. [facility]  and team have been notified of patient admission. [facility] liaison will continue to follow patient during stay. If appropriate provide orders to resume home care at time of discharge.

## 2019-04-02 NOTE — PROGRESS NOTES
[facility]  Infectious Disease Progress Note          Assessment and Plan:   IMPRESSION:   1.  A 58-year-old male with acute fever and respiratory symptoms, very likely aspiration event, history of prior aspiration pneumonia and this episode behaving like multiple others with rapid improvement occurring.  No clear major infection symptoms.  Possibly primarily chemical pneumonitis.   2.  History of numerous historical aspiration pneumonia events, some with major sepsis, historically Pseudomonas colonization, but not recently.  In the past MRSA as well.  Aggressive interventions already being done for recurrent aspiration.   3.  Traumatic brain injury, aphasia, and spastic paralysis.   4.  Seizure disorder.   5.  Methicillin-resistant Staphylococcus aureus and vancomycin-resistant Enterococci colonization.      RECOMMENDATIONS:   1.  Agree with Dr. Connors, behaving like several prior episodes.  Primarily a chemical pneumonitis event rather than a true infection.  Would continue Zosyn while he is here, but as soon as anytime, I think it is okay to switch to oral antibiotics with 1 week of oral Augmentin.   2.  Already doing maximum interventions to try to prevent aspiration from occurring.  No antibiotic strategy that is effective for this situation.             Interval History:   no new complaints T down neuro same O2 OK cxs neg              Medications:       acetylcysteine  2 mL Nebulization 4x Daily     albuterol  2.5 mg Nebulization Q4H While awake     bacitracin   Topical Daily     Brivaracetam  100 mg Per J Tube BID     calcium carbonate  1,250 mg Per J Tube TID w/meals     carBAMazepine  150 mg Per J Tube Q6H     enoxaparin  40 mg Subcutaneous Q24H     ferrous sulfate  300 mg Per Feeding Tube Daily     fiber modular (NUTRISOURCE FIBER)  1 packet Per G Tube Daily     hydrocortisone  10 mg Per J Tube Daily with supper     hydrocortisone  20 mg Per J Tube QAM     levothyroxine  137 mcg Per  J Tube QAM AC     melatonin  6 mg Per J Tube At Bedtime     multivitamins w/minerals  15 mL Per J Tube Daily     pantoprazole  40 mg Per J Tube Daily     piperacillin-tazobactam  4.5 g Intravenous Q6H     potassium & sodium phosphates  1 packet Per J Tube TID     sodium chloride (PF)  3 mL Intracatheter Q8H     vitamin D3  2,000 Units Per J Tube QAM                  Physical Exam:   Blood pressure 102/56, pulse 55, temperature 97.6  F (36.4  C), temperature source Oral, resp. rate 18, height 1.829 m (6'), weight 72 kg (158 lb 11.7 oz), SpO2 93 %.  Wt Readings from Last 2 Encounters:   04/02/19 72 kg (158 lb 11.7 oz)   03/18/19 70.9 kg (156 lb 4.9 oz)     Vital Signs with Ranges  Temp:  [97.6  F (36.4  C)-99.9  F (37.7  C)] 97.6  F (36.4  C)  Heart Rate:  [82-91] 90  Resp:  [18] 18  BP: (102-108)/(56-57) 102/56  SpO2:  [89 %-97 %] 93 %    Constitutional: Awake, alert, no apparent distress neuro same   Lungs: Clear to auscultation bilaterally, no crackles or wheezing   Cardiovascular: Regular rate and rhythm, normal S1 and S2, and no murmur noted   Abdomen: Normal bowel sounds, soft, non-distended, non-tender   Skin: No rashes, no cyanosis, no edema   Other:           Data:   All microbiology laboratory data reviewed.  Recent Labs   Lab Test 04/02/19  0821 04/01/19  0907 03/30/19 2000   WBC 10.1 9.0 14.5*   HGB 10.7* 10.7* 12.9*   HCT 33.0* 32.5* 39.7*   MCV 84 83 84    190 233     Recent Labs   Lab Test 04/02/19  0821 04/01/19  0907 03/30/19 2000   CR 0.99 0.87 0.82     No lab results found.  Recent Labs   Lab Test 03/30/19 2130 03/30/19 2110 03/30/19 2000 03/14/19  0345 03/14/19  0215 03/09/19  2110 03/09/19 2100 03/09/19 2000 02/27/19  2130   CULT No growth No growth after 2 days No growth after 2 days No growth No growth No growth No growth No growth No growth

## 2019-04-02 NOTE — DISCHARGE SUMMARY
[facility]  Hospitalist Discharge Summary       Date of Admission:  3/30/2019  Date of Discharge:  4/2/2019  Discharging Provider: Washington Connors MD      Discharge Diagnoses   1. Concern of acute on chronic aspiration pneumonia vs chemical pneumonitis  2. Multiple other chronic diagnoses as described in hospital course    Follow-ups Needed After Discharge   Follow-up Appointments     Follow-up and recommended labs and tests       Follow up with primary care provider, Carlos Gomez, as scheduled for you   on Tuesday 4/9/19 at 9:00am, for hospital follow- up.  No follow up labs   or test are needed.           Unresulted Labs Ordered in the Past 30 Days of this Admission     Date and Time Order Name Status Description    3/30/2019 1932 Blood culture Preliminary     3/30/2019 1932 Blood culture Preliminary       These results will be followed up by PCP    Discharge Disposition   Discharged to home  Condition at discharge: Stable    Hospital Course   Keyon Farias is a 58 year old male admitted on 3/30/2019. He Presents with fevers, cough, witnessed emesis event suspicious for aspiration in the setting of recurrent aspiration pneumonias.     Right middle and lower lobe aspiration pneumonia versus pneumonitis with associated mild sepsis: Patient tachycardic, febrile, leukocytosis to 14.5.  Frequent recurrence of aspiration events despite J-tube feedings and aspiration precautions.  Historically, patient recovers quite quickly from febrile events following aspiration, potentially suggesting pneumonitis rather than pneumonia  - IV Zosyn continued given frequent hospitalizations and antibiotic exposure.   - Per patient's guardian, he is still completing a 10-day course of Augmentin from last hospitalization (she reports 2 tabs left, though should have completed 3/28)  - Oximetry, oxygen as needed - 3/31 AM, on RA with good sats  - Aspiration precautions including head of bed at 40 degrees with feedings and  meds, nothing by mouth including medications  - Continue PTA albuterol nebulizer treatments and Mucomyst nebulizer treatments  - 4/1 - Continues to be afebrile, WBC pending, will request consultation from infectious disease given recurrent issue and multiple antibiotic regimens; was previously on zosyn-->augmentin just prior to admission.   - in chart review, appears that essentially everything that can be done to avoid aspiration is being done - ensuring angle of bed especially during TF, only using J tube for feeds and meds, etc.      Nutrition:  - Nutrition consulted for tube feeding assessment  - For now, Isosource 1.5 at 100 mL/h 7 AM to 7 PM as well as free water flushes of 150 mL every 4 hours as per prior dietary recommendations.  - Protonix solution every morning  - Feeding and medications only through J-tube  - Pharmacy consulted for feeding tube medication interactions     Traumatic brain injury with sequelae of:  Aphasia, spastic paralysis:  - Wound consult for high risk of pressure injury     Dysphagia with recurrent aspiration events:  - Tube feedings as above and strict n.p.o. status     Seizure disorder:  - Continue prior to admission Tegretol, Briviact      Panhypopituitarism:  - Continue hydrocortisone 20 mg every morning, 10 mg nightly.   - Patient is febrile, and has received stress dose steroids in the past.  Given prior rapid resolution of symptoms historically and normotension currently, holding on stress dose steroids at this time while closely monitoring.     Diabetes insipidus: Mild, historically has been on and off of DDAVP.  Not currently on DDAVP.  Follows with endocrinology through [facility].  Currently with normal serum sodium.  Note that patient is also no longer on sodium phosphate tablets.  - Trend intake and output     Hypothyroidism:  - Continue prior to admission levothyroxine     Hypogonadotropic hypogonadism:  - Resume prior to admission testosterone  injections in the outpatient setting    == Patient will discharge on augmentin bid for another week at the recommendation of Infectious Disease specialist. Otherwise all cares and plans from PTA will continue at the time of discharge ==                Consultations This Hospital Stay   NUTRITION SERVICES ADULT IP CONSULT  PHARMACY IP CONSULT  WOUND OSTOMY CONTINENCE NURSE  IP CONSULT  INFECTIOUS DISEASES IP CONSULT  CARE TRANSITION RN/SW IP CONSULT    Code Status   Full Code    Time Spent on this Encounter   I, Washington Connors, personally saw the patient today and spent greater than 30 minutes discharging this patient.       Washington Connors MD  [facility]  ______________________________________________________________________    Physical Exam   Vital Signs: Temp: 97.6  F (36.4  C) Temp src: Oral BP: 102/56   Heart Rate: 90 Resp: 18 SpO2: 93 % O2 Device: None (Room air) Oxygen Delivery: 2 LPM  Weight: 158 lbs 11.7 oz    Gen: NAD, pleasant  HEENT: MMM, tracks visually  Resp: no crackles or wheezes, no increased work of resp  CV: S1S2 heard, reg rhythm, reg rate, no pitting pedal edema  Abdo: soft, nontender, nondistended, bowel sounds present  Ext: calves nontender, well perfused   Neuro: AAOx3, CN grossly intact, no facial asymmetry       Primary Care Physician   Carlos Gomez    Immunizations       Discharge Orders      Home care nursing referral      Reason for your hospital stay    Concern of recurrent aspiration pneumonia vs chemical pneumonitis     Activity    Your activity upon discharge: activity as tolerated     Discharge Instructions    Continue doing all cares as previous.     Follow-up and recommended labs and tests     Follow up with primary care provider, Carlos Gomez, as scheduled for you on Tuesday 4/9/19 at 9:00am, for hospital follow- up.  No follow up labs or test are needed.     Full Code     Diet    Follow this diet upon discharge: Orders Placed This Encounter      Adult  Formula Drip Feeding: Continuous Isosource 1.5; Other - Specify in Comment; Goal Rate: 100/hr; mL/hr; From: 7:00 AM; 7:00 PM; Medication - Feeding Tube Flush Frequency: At least 15-30 mL water before and after medication administration and w...      NPO for Medical/Clinical Reasons Except for: No Exceptions       Significant Results and Procedures   Most Recent 3 CBC's:  Recent Labs   Lab Test 04/02/19 0821 04/01/19 0907 03/30/19 2000   WBC 10.1 9.0 14.5*   HGB 10.7* 10.7* 12.9*   MCV 84 83 84    190 233     Most Recent 3 BMP's:  Recent Labs   Lab Test 04/02/19 0821 04/01/19  1543 04/01/19 0907 03/30/19 2000     --  137 139   POTASSIUM 3.6 4.6 3.3* 4.1   CHLORIDE 104  --  104 101   CO2 27  --  25 29   BUN 12  --  12 14   CR 0.99  --  0.87 0.82   ANIONGAP 6  --  8 9   STEVE 8.3*  --  8.1* 8.8   *  --  128* 142*     Most Recent 2 LFT's:  Recent Labs   Lab Test 03/30/19 2000 03/14/19  0215   AST 22 24   ALT 40 38   ALKPHOS 107 84   BILITOTAL 0.2 0.4     Most Recent 3 INR's:  Recent Labs   Lab Test 02/07/17  0452 01/30/17  0340 01/25/17  0426   INR 1.27* 1.32* 1.49*   ,   Results for orders placed or performed during the hospital encounter of 03/30/19   XR Chest 2 Views    Narrative    CHEST TWO VIEWS  3/30/2019 7:47 PM     HISTORY:  Fever. History of recent pneumonia.    COMPARISON: 3/18/2019.      Impression    IMPRESSION: Shallow inspiration. Patchy opacity at the right lung base  has a similar appearance to the previous exam, and is suspicious for  persistent or recurrent pneumonia. The left lung is clear. No pleural  effusions. Pulmonary vascularity is within normal limits.    LEANDRA RIVERA MD       Discharge Medications   Discharge Medication List as of 4/2/2019  2:58 PM      CONTINUE these medications which have CHANGED    Details   amoxicillin-clavulanate (AUGMENTIN) 875-125 MG tablet Take 1 tablet by mouth every 12 hours, Disp-14 tablet, R-0, E-Prescribe      pantoprazole (PROTONIX)  2 mg/mL SUSP suspension 20 mLs (40 mg) by Per J Tube route daily, Disp-400 mL, R-1, E-Prescribe         CONTINUE these medications which have NOT CHANGED    Details   acetaminophen (TYLENOL) 500 MG tablet 1,000 mg by Oral or FT or NG tube route every 6 hours as needed for mild pain, Historical      acetylcysteine (MUCOMYST) 20 % neb solution Take 2 mLs by nebulization 4 times daily, Disp-300 vial, R-0, E-Prescribe      albuterol (PROVENTIL) (5 MG/ML) 0.5% neb solution Take 0.5 mLs (2.5 mg) by nebulization every 4 hours (while awake), Disp-360 mL, R-0, E-Prescribe      bacitracin ointment Apply topically daily To PEG site. Historical      Brivaracetam (BRIVIACT) 10 MG/ML solution 100 mg by Oral or FT or NG tube route 2 times daily @0900 and 2100, Historical      calcium carbonate 1250 (500 CA) MG/5ML SUSP suspension 5 mLs (1,250 mg) by Per J Tube route 3 times daily (with meals), Disp-450 mL, Transitional      carBAMazepine (TEGRETOL) 100 MG/5ML suspension Take 150 mg by mouth every 6 hours At 06:00, 12:00, 18:00 and 24:00 for seizures, Historical      ferrous sulfate 220 (44 Fe) MG/5ML ELIX 220 mg by Per Feeding Tube route daily, Historical      Guar Gum (FIBER MODULAR, NUTRISOURCE FIBER,) packet 1 packet by Per G Tube route daily, Disp-30 packet, R-0, E-Prescribe      !! hydrocortisone (CORTEF) 5 MG tablet 10 mg by Oral or FT or NG tube route daily (with dinner) At 1500, Historical      !! hydrocortisone (CORTEF) 5 MG tablet 20 mg by Oral or FT or NG tube route every morning , Historical      levothyroxine (SYNTHROID/LEVOTHROID) 137 MCG tablet 137 mcg by Oral or FT or NG tube route daily, Historical      melatonin (MELATONIN) 1 MG/ML LIQD liquid 6 mg by Per NG tube route At Bedtime , Historical      Multiple Vitamins-Minerals (CENTRUM SILVER) per tablet Take 1 tablet by mouth daily Crush and feed via j-tube, Historical      order for DME Equipment being ordered: NebulizerDisp-1 Units, R-0, Local Print       potassium & sodium phosphates (NEUTRA-PHOS) 280-160-250 MG Packet Take 1 packet by mouth 3 times daily 0900, 1500, 2100. , Historical      testosterone cypionate (DEPOTESTOTERONE CYPIONATE) 200 MG/ML injection Inject 76 mg into the muscle See Admin Instructions Every 2 weeks on Fridays   76 mg or 0.38 mL, Historical      Vitamin D, Cholecalciferol, 1000 units TABS Take 2,000 Units by mouth every morning Crush and feed via j-tube, Historical       !! - Potential duplicate medications found. Please discuss with provider.        Allergies   Allergies   Allergen Reactions     Dilantin [Phenytoin Sodium]      Valproic Acid      Toxicity c bone marrow suspension, elevated ammonia levels

## 2019-04-03 ENCOUNTER — APPOINTMENT (OUTPATIENT)
Dept: GENERAL RADIOLOGY | Facility: CLINIC | Age: 59
DRG: 178 | End: 2019-04-03
Attending: NURSE PRACTITIONER
Payer: MEDICARE

## 2019-04-03 ENCOUNTER — HOSPITAL ENCOUNTER (INPATIENT)
Facility: CLINIC | Age: 59
LOS: 5 days | Discharge: HOME HEALTH | DRG: 178 | End: 2019-04-08
Attending: NURSE PRACTITIONER | Admitting: HOSPITALIST
Payer: MEDICARE

## 2019-04-03 DIAGNOSIS — J69.0 ASPIRATION PNEUMONIA OF BOTH LOWER LOBES DUE TO GASTRIC SECRETIONS (H): Primary | ICD-10-CM

## 2019-04-03 DIAGNOSIS — E23.0 PANHYPOPITUITARISM (H): ICD-10-CM

## 2019-04-03 DIAGNOSIS — J69.0 ASPIRATION PNEUMONITIS (H): ICD-10-CM

## 2019-04-03 DIAGNOSIS — K21.9 GASTROESOPHAGEAL REFLUX DISEASE, ESOPHAGITIS PRESENCE NOT SPECIFIED: ICD-10-CM

## 2019-04-03 DIAGNOSIS — J69.0 ASPIRATION PNEUMONIA OF RIGHT LOWER LOBE, UNSPECIFIED ASPIRATION PNEUMONIA TYPE (H): ICD-10-CM

## 2019-04-03 LAB
ALBUMIN SERPL-MCNC: 3.1 G/DL (ref 3.4–5)
ALBUMIN UR-MCNC: 30 MG/DL
ALP SERPL-CCNC: 87 U/L (ref 40–150)
ALT SERPL W P-5'-P-CCNC: 32 U/L (ref 0–70)
ANION GAP SERPL CALCULATED.3IONS-SCNC: 7 MMOL/L (ref 3–14)
APPEARANCE UR: CLEAR
AST SERPL W P-5'-P-CCNC: 26 U/L (ref 0–45)
BASOPHILS # BLD AUTO: 0.1 10E9/L (ref 0–0.2)
BASOPHILS NFR BLD AUTO: 0.9 %
BILIRUB SERPL-MCNC: 0.2 MG/DL (ref 0.2–1.3)
BILIRUB UR QL STRIP: NEGATIVE
BUN SERPL-MCNC: 14 MG/DL (ref 7–30)
CALCIUM SERPL-MCNC: 8.9 MG/DL (ref 8.5–10.1)
CHLORIDE SERPL-SCNC: 102 MMOL/L (ref 94–109)
CO2 SERPL-SCNC: 26 MMOL/L (ref 20–32)
COLOR UR AUTO: YELLOW
CREAT SERPL-MCNC: 0.91 MG/DL (ref 0.66–1.25)
DIFFERENTIAL METHOD BLD: ABNORMAL
EOSINOPHIL # BLD AUTO: 0.6 10E9/L (ref 0–0.7)
EOSINOPHIL NFR BLD AUTO: 5.9 %
ERYTHROCYTE [DISTWIDTH] IN BLOOD BY AUTOMATED COUNT: 15.5 % (ref 10–15)
GFR SERPL CREATININE-BSD FRML MDRD: >90 ML/MIN/{1.73_M2}
GLUCOSE SERPL-MCNC: 98 MG/DL (ref 70–99)
GLUCOSE UR STRIP-MCNC: NEGATIVE MG/DL
HCT VFR BLD AUTO: 37.1 % (ref 40–53)
HGB BLD-MCNC: 12.1 G/DL (ref 13.3–17.7)
HGB UR QL STRIP: NEGATIVE
IMM GRANULOCYTES # BLD: 0 10E9/L (ref 0–0.4)
IMM GRANULOCYTES NFR BLD: 0.4 %
KETONES UR STRIP-MCNC: NEGATIVE MG/DL
LACTATE BLD-SCNC: 0.9 MMOL/L (ref 0.7–2)
LEUKOCYTE ESTERASE UR QL STRIP: NEGATIVE
LYMPHOCYTES # BLD AUTO: 2.8 10E9/L (ref 0.8–5.3)
LYMPHOCYTES NFR BLD AUTO: 26.9 %
MCH RBC QN AUTO: 27.3 PG (ref 26.5–33)
MCHC RBC AUTO-ENTMCNC: 32.6 G/DL (ref 31.5–36.5)
MCV RBC AUTO: 84 FL (ref 78–100)
MONOCYTES # BLD AUTO: 1.1 10E9/L (ref 0–1.3)
MONOCYTES NFR BLD AUTO: 10.8 %
MUCOUS THREADS #/AREA URNS LPF: PRESENT /LPF
NEUTROPHILS # BLD AUTO: 5.6 10E9/L (ref 1.6–8.3)
NEUTROPHILS NFR BLD AUTO: 55.1 %
NITRATE UR QL: NEGATIVE
NRBC # BLD AUTO: 0 10*3/UL
NRBC BLD AUTO-RTO: 0 /100
PH UR STRIP: 8 PH (ref 5–7)
PLATELET # BLD AUTO: 260 10E9/L (ref 150–450)
POTASSIUM SERPL-SCNC: 4.6 MMOL/L (ref 3.4–5.3)
PROT SERPL-MCNC: 8.3 G/DL (ref 6.8–8.8)
RBC # BLD AUTO: 4.43 10E12/L (ref 4.4–5.9)
RBC #/AREA URNS AUTO: 1 /HPF (ref 0–2)
SODIUM SERPL-SCNC: 135 MMOL/L (ref 133–144)
SOURCE: ABNORMAL
SP GR UR STRIP: 1.02 (ref 1–1.03)
SQUAMOUS #/AREA URNS AUTO: <1 /HPF (ref 0–1)
UROBILINOGEN UR STRIP-MCNC: 4 MG/DL (ref 0–2)
WBC # BLD AUTO: 10.2 10E9/L (ref 4–11)
WBC #/AREA URNS AUTO: 2 /HPF (ref 0–5)

## 2019-04-03 PROCEDURE — 25000128 H RX IP 250 OP 636: Performed by: HOSPITALIST

## 2019-04-03 PROCEDURE — 36415 COLL VENOUS BLD VENIPUNCTURE: CPT

## 2019-04-03 PROCEDURE — 12000000 ZZH R&B MED SURG/OB

## 2019-04-03 PROCEDURE — 99223 1ST HOSP IP/OBS HIGH 75: CPT | Mod: AI | Performed by: HOSPITALIST

## 2019-04-03 PROCEDURE — 96365 THER/PROPH/DIAG IV INF INIT: CPT

## 2019-04-03 PROCEDURE — 99285 EMERGENCY DEPT VISIT HI MDM: CPT | Mod: 25

## 2019-04-03 PROCEDURE — 25800030 ZZH RX IP 258 OP 636: Performed by: HOSPITALIST

## 2019-04-03 PROCEDURE — 71046 X-RAY EXAM CHEST 2 VIEWS: CPT

## 2019-04-03 PROCEDURE — 94640 AIRWAY INHALATION TREATMENT: CPT

## 2019-04-03 PROCEDURE — 87086 URINE CULTURE/COLONY COUNT: CPT | Performed by: NURSE PRACTITIONER

## 2019-04-03 PROCEDURE — 40000275 ZZH STATISTIC RCP TIME EA 10 MIN

## 2019-04-03 PROCEDURE — 25000132 ZZH RX MED GY IP 250 OP 250 PS 637: Mod: GY | Performed by: HOSPITALIST

## 2019-04-03 PROCEDURE — 83605 ASSAY OF LACTIC ACID: CPT | Performed by: NURSE PRACTITIONER

## 2019-04-03 PROCEDURE — 96361 HYDRATE IV INFUSION ADD-ON: CPT

## 2019-04-03 PROCEDURE — A9270 NON-COVERED ITEM OR SERVICE: HCPCS | Mod: GY | Performed by: HOSPITALIST

## 2019-04-03 PROCEDURE — 25000125 ZZHC RX 250: Performed by: HOSPITALIST

## 2019-04-03 PROCEDURE — 81001 URINALYSIS AUTO W/SCOPE: CPT | Performed by: NURSE PRACTITIONER

## 2019-04-03 PROCEDURE — 87040 BLOOD CULTURE FOR BACTERIA: CPT | Performed by: NURSE PRACTITIONER

## 2019-04-03 PROCEDURE — 85025 COMPLETE CBC W/AUTO DIFF WBC: CPT | Performed by: EMERGENCY MEDICINE

## 2019-04-03 PROCEDURE — 80053 COMPREHEN METABOLIC PANEL: CPT | Performed by: EMERGENCY MEDICINE

## 2019-04-03 PROCEDURE — 25000128 H RX IP 250 OP 636: Performed by: NURSE PRACTITIONER

## 2019-04-03 RX ORDER — GLYCOPYRROLATE 2 MG/1
1-2 TABLET ORAL 2 TIMES DAILY PRN
Status: ON HOLD | COMMUNITY
End: 2019-09-14

## 2019-04-03 RX ORDER — PROCHLORPERAZINE MALEATE 5 MG
10 TABLET ORAL EVERY 6 HOURS PRN
Status: DISCONTINUED | OUTPATIENT
Start: 2019-04-03 | End: 2019-04-08 | Stop reason: HOSPADM

## 2019-04-03 RX ORDER — AMOXICILLIN 250 MG
2 CAPSULE ORAL 2 TIMES DAILY PRN
Status: DISCONTINUED | OUTPATIENT
Start: 2019-04-03 | End: 2019-04-08 | Stop reason: HOSPADM

## 2019-04-03 RX ORDER — ACETAMINOPHEN 325 MG/1
650 TABLET ORAL EVERY 4 HOURS PRN
Status: DISCONTINUED | OUTPATIENT
Start: 2019-04-03 | End: 2019-04-08 | Stop reason: HOSPADM

## 2019-04-03 RX ORDER — AMOXICILLIN 250 MG
1 CAPSULE ORAL 2 TIMES DAILY PRN
Status: DISCONTINUED | OUTPATIENT
Start: 2019-04-03 | End: 2019-04-08 | Stop reason: HOSPADM

## 2019-04-03 RX ORDER — CARBAMAZEPINE 100 MG/5ML
150 SUSPENSION ORAL EVERY 6 HOURS
Status: DISCONTINUED | OUTPATIENT
Start: 2019-04-03 | End: 2019-04-08 | Stop reason: HOSPADM

## 2019-04-03 RX ORDER — LORAZEPAM 2 MG/ML
2 INJECTION INTRAMUSCULAR
Status: DISCONTINUED | OUTPATIENT
Start: 2019-04-03 | End: 2019-04-08 | Stop reason: HOSPADM

## 2019-04-03 RX ORDER — PIPERACILLIN SODIUM, TAZOBACTAM SODIUM 4; .5 G/20ML; G/20ML
4.5 INJECTION, POWDER, LYOPHILIZED, FOR SOLUTION INTRAVENOUS ONCE
Status: COMPLETED | OUTPATIENT
Start: 2019-04-03 | End: 2019-04-03

## 2019-04-03 RX ORDER — ONDANSETRON 4 MG/1
4 TABLET, ORALLY DISINTEGRATING ORAL EVERY 6 HOURS PRN
Status: DISCONTINUED | OUTPATIENT
Start: 2019-04-03 | End: 2019-04-08 | Stop reason: HOSPADM

## 2019-04-03 RX ORDER — SODIUM CHLORIDE 9 MG/ML
INJECTION, SOLUTION INTRAVENOUS CONTINUOUS
Status: DISCONTINUED | OUTPATIENT
Start: 2019-04-03 | End: 2019-04-04

## 2019-04-03 RX ORDER — LANOLIN ALCOHOL/MO/W.PET/CERES
6 CREAM (GRAM) TOPICAL
Status: DISCONTINUED | OUTPATIENT
Start: 2019-04-03 | End: 2019-04-08 | Stop reason: HOSPADM

## 2019-04-03 RX ORDER — GUAR GUM
1 PACKET (EA) ORAL DAILY
Status: DISCONTINUED | OUTPATIENT
Start: 2019-04-03 | End: 2019-04-08 | Stop reason: HOSPADM

## 2019-04-03 RX ORDER — PROCHLORPERAZINE 25 MG
25 SUPPOSITORY, RECTAL RECTAL EVERY 12 HOURS PRN
Status: DISCONTINUED | OUTPATIENT
Start: 2019-04-03 | End: 2019-04-08 | Stop reason: HOSPADM

## 2019-04-03 RX ORDER — PIPERACILLIN SODIUM, TAZOBACTAM SODIUM 4; .5 G/20ML; G/20ML
4.5 INJECTION, POWDER, LYOPHILIZED, FOR SOLUTION INTRAVENOUS EVERY 6 HOURS
Status: DISCONTINUED | OUTPATIENT
Start: 2019-04-03 | End: 2019-04-07

## 2019-04-03 RX ORDER — GLYCOPYRROLATE 1 MG/1
1-2 TABLET ORAL 2 TIMES DAILY PRN
Status: DISCONTINUED | OUTPATIENT
Start: 2019-04-03 | End: 2019-04-08 | Stop reason: HOSPADM

## 2019-04-03 RX ORDER — HYDROCORTISONE 10 MG/1
10 TABLET ORAL
Status: DISCONTINUED | OUTPATIENT
Start: 2019-04-03 | End: 2019-04-08 | Stop reason: HOSPADM

## 2019-04-03 RX ORDER — ACETYLCYSTEINE 200 MG/ML
2 SOLUTION ORAL; RESPIRATORY (INHALATION) 4 TIMES DAILY
Status: DISCONTINUED | OUTPATIENT
Start: 2019-04-03 | End: 2019-04-08 | Stop reason: HOSPADM

## 2019-04-03 RX ORDER — ALBUTEROL SULFATE 0.83 MG/ML
2.5 SOLUTION RESPIRATORY (INHALATION) 4 TIMES DAILY
Status: DISCONTINUED | OUTPATIENT
Start: 2019-04-03 | End: 2019-04-08 | Stop reason: HOSPADM

## 2019-04-03 RX ORDER — HYDROCORTISONE 20 MG/1
20 TABLET ORAL EVERY MORNING
Status: DISCONTINUED | OUTPATIENT
Start: 2019-04-04 | End: 2019-04-08 | Stop reason: HOSPADM

## 2019-04-03 RX ORDER — BACITRACIN ZINC 500 [USP'U]/G
OINTMENT TOPICAL DAILY PRN
Status: DISCONTINUED | OUTPATIENT
Start: 2019-04-03 | End: 2019-04-08 | Stop reason: HOSPADM

## 2019-04-03 RX ORDER — LEVOTHYROXINE SODIUM 137 UG/1
137 TABLET ORAL DAILY
Status: DISCONTINUED | OUTPATIENT
Start: 2019-04-04 | End: 2019-04-08 | Stop reason: HOSPADM

## 2019-04-03 RX ORDER — ONDANSETRON 2 MG/ML
4 INJECTION INTRAMUSCULAR; INTRAVENOUS EVERY 6 HOURS PRN
Status: DISCONTINUED | OUTPATIENT
Start: 2019-04-03 | End: 2019-04-08 | Stop reason: HOSPADM

## 2019-04-03 RX ORDER — NALOXONE HYDROCHLORIDE 0.4 MG/ML
.1-.4 INJECTION, SOLUTION INTRAMUSCULAR; INTRAVENOUS; SUBCUTANEOUS
Status: DISCONTINUED | OUTPATIENT
Start: 2019-04-03 | End: 2019-04-08 | Stop reason: HOSPADM

## 2019-04-03 RX ADMIN — SODIUM CHLORIDE: 9 INJECTION, SOLUTION INTRAVENOUS at 19:41

## 2019-04-03 RX ADMIN — HYDROCORTISONE 10 MG: 10 TABLET ORAL at 21:22

## 2019-04-03 RX ADMIN — MELATONIN TAB 3 MG 6 MG: 3 TAB at 22:46

## 2019-04-03 RX ADMIN — ALBUTEROL SULFATE 2.5 MG: 2.5 SOLUTION RESPIRATORY (INHALATION) at 19:56

## 2019-04-03 RX ADMIN — BRIVARACETAM 100 MG: 10 SOLUTION ORAL at 20:16

## 2019-04-03 RX ADMIN — PIPERACILLIN SODIUM,TAZOBACTAM SODIUM 4.5 G: 4; .5 INJECTION, POWDER, FOR SOLUTION INTRAVENOUS at 17:08

## 2019-04-03 RX ADMIN — ACETYLCYSTEINE 4 ML: 200 SOLUTION ORAL; RESPIRATORY (INHALATION) at 19:56

## 2019-04-03 RX ADMIN — PIPERACILLIN SODIUM,TAZOBACTAM SODIUM 4.5 G: 4; .5 INJECTION, POWDER, FOR SOLUTION INTRAVENOUS at 22:48

## 2019-04-03 RX ADMIN — ENOXAPARIN SODIUM 40 MG: 40 INJECTION SUBCUTANEOUS at 19:52

## 2019-04-03 RX ADMIN — POTASSIUM & SODIUM PHOSPHATES POWDER PACK 280-160-250 MG 1 PACKET: 280-160-250 PACK at 21:22

## 2019-04-03 RX ADMIN — SODIUM CHLORIDE 1000 ML: 9 INJECTION, SOLUTION INTRAVENOUS at 14:51

## 2019-04-03 ASSESSMENT — ACTIVITIES OF DAILY LIVING (ADL): ADLS_ACUITY_SCORE: 44

## 2019-04-03 ASSESSMENT — ENCOUNTER SYMPTOMS
FEVER: 1
COUGH: 1

## 2019-04-03 ASSESSMENT — MIFFLIN-ST. JEOR: SCORE: 1584.68

## 2019-04-03 NOTE — ED PROVIDER NOTES
"  History     Chief Complaint:  Fever    The history is provided by a relative. The history is limited by the condition of the patient.      Keyon Farias is a 58 year old male with a history of a TBI, seizures, and cardiac arrest who presents with his mother for evaluation of a fever and worsened cough. He was discharged from the hospital yesterday after being admitted on 3/30 for pneumonia versus aspiration pneumonitis. When he was discharged, his mother reports he did not have a fever. He re-spiked a fever today and she noted his cough was now productive of green, liquid sputum. When she spoke to the patient's in-home nurse, the nurse recommended they re-present to the ED due to increased productive sputum and worsening cough. Here, he is reportedly at his baseline mental status.  He takes all feedings per J-tube, but can move/ambulate with PT assistance. His mother did not perform any suctioning at home as she states \"he does not like the treatments.\"     Allergies:  Dilantin  Valproic acid    Medications:    Augmentin  Protonix  Albuterol nebulizer  Brivaracetam  Carbamazepine  Hydrocortisone  Levothyroxine  Melatonin  Depotestosterone     Past Medical History:    TBI   Aphasia post TBI  GERD  Panhypopituitarism  Seizures  Septic shock  Spastic hemiplegia  Thyroid disease  Cardiac arrest    Past Surgical History:    EGD, 3  Reconstructive facial surgery  GJ tube placement  Appendectomy  Right hand repair  Tracheostomy x2  Vascular surgery    Family History:    No past pertinent family history.    Social History:  Marital Status:  Single [1]  Mom, Savannah, at bedside.   Former smoker.   Negative for alcohol use.     Review of Systems   Constitutional: Positive for fever.   Respiratory: Positive for cough.    RoS is limited due to the patient's nonverbal status. It has been supplemented by his mother.     Physical Exam     Patient Vitals for the past 24 hrs:   BP Temp Temp src Pulse Resp SpO2 Height Weight " "  04/03/19 1347 100/73 100.9  F (38.3  C) Rectal 92 16 91 % 1.829 m (6') 71.7 kg (158 lb)     Physical Exam  Nursing notes reviewed. Vitals reviewed.  General: Alert. Well kept.  Eyes:  Conjunctiva non-injected, non-icteric.  Neck/Throat: dry mucous membranes, oropharynx without erythema or exudate. No nuchal rigidity.  Normal voice.  Cardiac: Regular rhythm. Normal heart sounds with no murmur/rubs/click.   Pulmonary: diminished breath sounds bilateral. Persistent cough  Abdomen: Soft. Non-distended. Non-tender to palpation. J-tube left abdomen  Musculoskeletal: Normal gross range of motion of left upper extremity.  Contractures RUE with 3/5 strength.  LLE with 4/5 strength and RLQ with 3/5 strength    Neurological: Alert and smiles and says \"hi\" at mother.  Skin: Warm and dry without rashes or petechiae. Normal appearance of visualized exposed skin. Wearing incontinence pad and condom catheter.  Psych: Affect normal. Good eye contact.    Emergency Department Course   Imaging:  Radiographic findings were communicated with the patient who voiced understanding of the findings.  XR Chest PA & LAT:   Shallow degree of inspiration. There are increased  interstitial opacities in the mid and lower lungs bilaterally. This  could be due to atelectasis but worsening infection cannot be  excluded. The cardiac silhouette is stable, as per radiology.     Laboratory:  CBC: WBC: 10.2, HGB: 12.1 (L), PLT: 260  CMP: Albumin: 3.1 (L), o/w WNL (Creatinine: 0.91)    1449 Lactic acid: 0.9  Blood cultures x2: Pending    UA with Microscopic: pH: 8.0 (H), Albumin: 30 (A), Urobilinogen: 4.0 (H), Mucous: Present (A), o/w WNL  Urine culture: Pending    Interventions:  1451 NS 1L IV  1708 Zosyn, 4.5 g, IV    Emergency Department Course:  Nursing notes and vitals reviewed.   (9939) I performed an exam of the patient as documented above.      IV inserted. Medicine administered as documented above. Blood drawn. This was sent to the lab for " further testing, results above.     The patient was sent for a chest x-ray while in the emergency department, findings above.      I shared service with Dr. Moyer.     (1707) I rechecked the patient and discussed the results of his workup thus far.  Oxygen saturation 95% on 2L nc.     (1715) I consulted with Dr. Galan of the hospitalist services. They are in agreement to accept the patient for admission.    Findings and plan explained to the Patient and mother who consents to admission. Discussed the patient with Dr. Galan, who will admit the patient to a medical bed for further monitoring, evaluation, and treatment.     I personally reviewed the laboratory results with the Patient and mother and answered all related questions prior to admission.     Impression & Plan      Medical Decision Making:  Keyon Farias is a 58 year old male who presents for evaluation of fever. The patient was admitted to the hospital on 3/30 and discharged yesterday. He was admitted at that time for chronic aspiration pneumonia vs chemical pneumonitis. His mother notes that he has an onset of fever again this morning and on chart review he did not have fevers from 3/31 till discharge. He is receiving the Augmentin for one week following discharge. He continues to be neurologically unchanged at his baseline. Today, lab work shows a white count of only 10.2, but he does have a fever of 100.9 after receiving Tylenol 2 hours previous. His initial SPO2 was 91% on room air and he does not use oxygen at home. His oxygen saturation was improved to 95% on 2L NC. His lactic acid is 0.9 and no signs of severe sepsis or septic shock. Patient does have increased interstitial opacities in the mid and lower lungs bilaterally on the chest x-ray that is concerning for worsening infection. IV antibiotics were administered in the emergency department with coverage for pseudomonas. Blood cultures pending. His urine was without signs of infection  and his abdomen continues to be soft and nontender. His mother is requesting admission due to worsening condition. I discussed the patient with Dr. Galan who accepts the patient for admission.      Diagnosis:    ICD-10-CM    1. Aspiration pneumonitis (H) J69.0      Disposition:  Admitted to inpatient medical under the care of Dr. Galan    Scribe Disclosure:  I, Shea Dawson, am serving as a scribe on 4/3/2019 at 2:29 PM to personally document services performed by Nathalie Ayala CNP based on my observations and the provider's statements to me.       Shea Dawson  4/3/2019    EMERGENCY DEPARTMENT       Nathalie Ayala CNP  04/03/19 1931

## 2019-04-03 NOTE — ED NOTES
Bed: ED26  Expected date:   Expected time:   Means of arrival:   Comments:  E1  58 F high temp  1331

## 2019-04-03 NOTE — H&P
Admitted:     04/03/2019      PRIMARY CARE PHYSICIAN:  Carlos Gomez MD      CHIEF COMPLAINT:  Cough and increased secretions and fever.      HISTORY OF PRESENT ILLNESS:  History was reviewed from the patient's mother, who is the guardian for him, and history is limited from the patient due to his traumatic brain injury, Mr. 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  This is his seventh admission this year for similar presentation and was just discharged from the hospital yesterday on 04/02/2019 following treatment for aspiration pneumonia.  He was treated with Zosyn and was discharged on p.o. Augmentin.  He was also evaluated by Infectious Disease.  It seems despite aspiration precautions including J-tube feeding, bed elevation, and no oral intake including no medications, he continues to have this recurrence of aspiration pneumonitis versus aspiration pneumonia.  Shortly after discharge, family states that he started having cough, has been having copious secretions, and was noted with a temperature up to 100.9 degrees Fahrenheit.  Mother states that she did not feel the patient was ready for discharge yesterday.  Here in the ER, he was seen by nurse practitioner, Nathalie.  The patient was noted to be febrile and was saturating 91% on room air.  Chest x-ray showed increased interstitial opacities bilaterally.  He was started on Zosyn and hospitalist was requested admission for further evaluation.  The patient denied any chest pain or shortness of breath, no pain in abdomen.      REVIEW OF SYSTEMS:  Difficult to review with the language barrier with the patient due to significant dysphagia and is minimally verbal.  Review of system was negative apart from those mentioned in the history of present illness.      PAST  MEDICAL HISTORY:   1.  Traumatic brain injury resulting in aphasia and spastic hemiplegia.   2.  Recurrent aspiration pneumonia versus pneumonitis.   3.  History of DVT upper extremity.   4.  Gastroesophageal reflux disease.   5.  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   6.  Seizures.   7.  Spastic hemiplegia.     8.  History of septic shock.   9.  Hypothyroidism.   10.  History of necrotizing pancreatitis.      MEDICATIONS PRIOR TO ADMISSION:   Medications Prior to Admission   Medication Sig Dispense Refill Last Dose     acetaminophen (TYLENOL) 500 MG tablet 1,000 mg by Oral or FT or NG tube route every 6 hours as needed for mild pain   4/3/2019 at 1300     acetylcysteine (MUCOMYST) 20 % neb solution Take 2 mLs by nebulization 4 times daily (Patient taking differently: Take 2 mLs by nebulization 4 times daily With Albuterol at 07:00, 11:00, 15:00, 19:00) 300 vial 0 4/3/2019 at 1100     albuterol (PROVENTIL) (5 MG/ML) 0.5% neb solution Take 0.5 mLs (2.5 mg) by nebulization every 4 hours (while awake) (Patient taking differently: Take 2.5 mg by nebulization 4 times daily With Mucomyst at 07:00, 11:00, 15:00, 19:00.) 360 mL 0 4/3/2019 at 1100     amoxicillin-clavulanate (AUGMENTIN) 875-125 MG tablet Take 1 tablet by mouth every 12 hours 14 tablet 0 4/3/2019 at am     Brivaracetam (BRIVIACT) 10 MG/ML solution 100 mg by Oral or FT or NG tube route 2 times daily @0900 and 2100   4/3/2019 at am     calcium carbonate 1250 (500 CA) MG/5ML SUSP suspension 5 mLs (1,250 mg) by Per J Tube route 3 times daily (with meals) 450 mL  4/3/2019 at am     carBAMazepine (TEGRETOL) 100 MG/5ML suspension Take 150 mg by mouth every 6 hours At 06:00, 12:00, 18:00 and 24:00 for seizures   4/3/2019 at 1200     ferrous sulfate 220 (44 Fe) MG/5ML ELIX 220 mg by Per Feeding Tube route daily   4/3/2019 at am     glycopyrrolate (GLYCATE) 2 MG tablet Take 1-2 mg by mouth 2 times daily as needed (secretions) 1/2 to 1 tablet (1 - 2  mg) up to twice daily if needed for secretions.   4/3/2019 at 1250     Guar Gum (FIBER MODULAR, NUTRISOURCE FIBER,) packet 1 packet by Per G Tube route daily 30 packet 0 4/3/2019 at am     hydrocortisone (CORTEF) 5 MG tablet 10 mg by Oral or FT or NG tube route daily (with dinner) At 1500   4/2/2019 at 1500     hydrocortisone (CORTEF) 5 MG tablet 20 mg by Oral or FT or NG tube route every morning    4/3/2019 at am     levothyroxine (SYNTHROID/LEVOTHROID) 137 MCG tablet 137 mcg by Oral or FT or NG tube route daily @ 05:00   4/3/2019 at 0500     melatonin (MELATONIN) 1 MG/ML LIQD liquid 6 mg by Per NG tube route At Bedtime    4/2/2019 at hs     Multiple Vitamins-Minerals (CENTRUM SILVER) per tablet Take 1 tablet by mouth daily Crush and feed via j-tube   4/3/2019 at am     pantoprazole (PROTONIX) 2 mg/mL SUSP suspension 20 mLs (40 mg) by Per J Tube route daily 400 mL 1 4/3/2019 at am     potassium & sodium phosphates (NEUTRA-PHOS) 280-160-250 MG Packet Take 1 packet by mouth 3 times daily 0900, 1500, 2100.    4/3/2019 at am     Vitamin D, Cholecalciferol, 1000 units TABS Take 2,000 Units by mouth every morning Crush and feed via j-tube   4/3/2019 at am     bacitracin ointment Apply topically daily as needed for wound care To PEG site.    prn     order for DME Equipment being ordered: Nebulizer 1 Units 0      testosterone cypionate (DEPOTESTOTERONE CYPIONATE) 200 MG/ML injection Inject 76 mg into the muscle See Admin Instructions Every 2 weeks on Fridays   76 mg or 0.38 mL   due this Friday         ALLERGIES:  DILANTIN AND VALPROIC ACID.      SOCIAL HISTORY:  His mother is his guardian.  He has home care.  He quit smoking in 1989.  No alcohol or illicit drug use.      FAMILY HISTORY:  Reviewed and not pertinent to current presentation.      PHYSICAL EXAMINATION:   GENERAL:  The patient is conscious, alert, awake, mostly nonverbal but follows simple commands and interacts minimally with yes/no of mouthing.  He does use  thumbs up for affirmative answers.   HEENT:  Pupils are equal and reactive to light and accommodation.  Extraocular movements are intact.  Oral mucosa is a dry.   NECK:  Supple, no raised JVD.   RESPIRATORY:  He has mild coarse breath sounds bilaterally, no significant wheezing.   CARDIOVASCULAR:  Normal S1-S2, regular rate and rhythm, no murmur.   ABDOMEN:  Soft, nontender, nondistended.  A GJ tube in place.  No guarding, rigidity, or rebound tenderness.  Bowel sounds are present.   LOWER EXTREMITIES:  With no edema.   NEUROLOGIC:  He has spastic hemiplegia on the right, mostly nonverbal.   PSYCHIATRY:  Normal mood.      LABORATORY DATA AND IMAGING:  Reviewed in Epic.  CMP mostly unremarkable including normal LFTs and normal lactate.  CBC with a WBC of 10.2, hemoglobin 12.1, platelets 260,000.  UA unremarkable.  Blood and urine cultures are pending.  Blood and urine culture from last admission on 03/30 has been with no growth so far.        Chest x-ray reviewed shows bilateral increased interstitial opacities with concern for atelectasis versus worsening infection.      ASSESSMENT AND PLAN:     Mr. Keyon Farias is a 58-year-old male with a traumatic brain injury with resultant aphasia, spastic paraplegia, seizure, panhypopituitarism, GERD, and recurrent aspiration pneumonias, who was just in hospital from 03/30 to 04/02 and presents back with increased cough, copious secretions, and fever with concerns for aspiration pneumonia again.  This will be his seventh admission for similar presentation this year in 2019.     1.  Recurrent aspiration pneumonia versus pneumonitis.    - We will admit him as inpatient.  He has had frequent recurrence events despite G-tube feedings and aspiration precautions.  Historically, he recovers quite quickly from febrile events following aspiration, potentially suggesting pneumonitis rather than pneumonia.  He has been evaluated by Infectious Disease on several occasions.  We will start  him on Zosyn again.  We will monitor for fever.  We will have aspiration precautions, n.p.o. including medications, and feeding will be through G-tube, G-tube also used for medications.  We will resume his PTA albuterol nebs, Mucomyst nebs.     2.  Nutrition:    - N.p.o., including for medications and G-tube feeding.  We will have Nutrition consult for initiation of tube feeding.  We will have Protonix.  We will also start him on normal saline at 75 mL per hour.     3.  Traumatic brain injury with sequelae of aphasia and spastic paralysis.  He gets home care services.  We will consult  for disposition planning.     4.  Seizure disorder.  Resume prior to admission, Tegretol and levetiracetam when verified by Pharmacy.   5.  Panhypopituitarism.  Resume PTA hydrocortisone 20 mg in morning and 10 mg nightly, not currently septic.  I do not see a need for stress dose steroids at this time.   6.  Hypothyroidism.  Continue PTA Synthroid.   7.  Hypogonadotropic hypogonadism.  Resume PTA Testosterone injection in the outpatient setting.   8.  Deep venous thrombosis prophylaxis with Lovenox.      CODE STATUS:  This was discussed and per family, he will be full code.         ABDON AUSTIN MD             D: 2019   T: 2019   MT: GIRMA      Name:     BERT BARAJAS   MRN:      [id number]        Account:      [id number]   :      1960        Admitted:     2019                   Document: [id number]       cc: Carlos Gomez MD

## 2019-04-03 NOTE — ED NOTES
"[facility]  ED Nurse Handoff Report    ED Chief complaint: Fever (Patient brought in by EMS from home. Patient has history of TBI and his mother is his caregiver. Patient was just discharged from the hospital yesterday. Today comes in woith complaints of fever and N/V. Patient had congested cough at home per mom.)      ED Diagnosis:   Final diagnoses:   Aspiration pneumonitis (H)       Code Status: Not addressed by ED MD.      Allergies:   Allergies   Allergen Reactions     Dilantin [Phenytoin Sodium]      Valproic Acid      Toxicity c bone marrow suspension, elevated ammonia levels        Activity level - Baseline/Home:  Total Care    Activity Level - Current:   Total Care     Needed?: No    Isolation: Yes  Infection: MRSA, VRE  Bariatric?: No    Vital Signs:   Vitals:    04/03/19 1347   BP: 100/73   Pulse: 92   Resp: 16   Temp: 100.9  F (38.3  C)   TempSrc: Rectal   SpO2: 91%   Weight: 71.7 kg (158 lb)   Height: 1.829 m (6')       Cardiac Rhythm: ,        Pain level:      Is this patient confused?: No   Does this patient have a guardian?  Yes         If yes, is there guardianship documents in the Epic \"Code/ACP\" activity?  Yes         Guardian Notified?  Yes  Mason - Suicide Severity Rating Scale Completed?  Yes  If yes, what color did the patient score?  White    Patient Report: Initial Complaint: Fever, cough  Focused Assessment: Patient lives at home with mom. Patient was discharged from the hospital yesterday. Today patient was brought in by EMS for fever and cough. Upon arrival patient had rhonchi with auscultation of lungs. Patient's oxygen sats were 89-92% on room air. With 2 L NC oxygen as at 98%. Patient has not had active coughing while in ED. Patient is alert. Mom is patient's caregiver and guardian.   Tests Performed:  Labs, CXR, UA  Abnormal Results:   Results for orders placed or performed during the hospital encounter of 04/03/19   Chest XR,  PA & LAT    Narrative    " XR CHEST 2 VW 4/3/2019 3:16 PM     HISTORY: cough with fever    COMPARISON: 3/30/2019      Impression    IMPRESSION: Shallow degree of inspiration. There are increased  interstitial opacities in the mid and lower lungs bilaterally. This  could be due to atelectasis but worsening infection cannot be  excluded. The cardiac silhouette is stable.    MYCHAL SPEARS MD   CBC with platelets differential   Result Value Ref Range    WBC 10.2 4.0 - 11.0 10e9/L    RBC Count 4.43 4.4 - 5.9 10e12/L    Hemoglobin 12.1 (L) 13.3 - 17.7 g/dL    Hematocrit 37.1 (L) 40.0 - 53.0 %    MCV 84 78 - 100 fl    MCH 27.3 26.5 - 33.0 pg    MCHC 32.6 31.5 - 36.5 g/dL    RDW 15.5 (H) 10.0 - 15.0 %    Platelet Count 260 150 - 450 10e9/L    Diff Method Automated Method     % Neutrophils 55.1 %    % Lymphocytes 26.9 %    % Monocytes 10.8 %    % Eosinophils 5.9 %    % Basophils 0.9 %    % Immature Granulocytes 0.4 %    Nucleated RBCs 0 0 /100    Absolute Neutrophil 5.6 1.6 - 8.3 10e9/L    Absolute Lymphocytes 2.8 0.8 - 5.3 10e9/L    Absolute Monocytes 1.1 0.0 - 1.3 10e9/L    Absolute Eosinophils 0.6 0.0 - 0.7 10e9/L    Absolute Basophils 0.1 0.0 - 0.2 10e9/L    Abs Immature Granulocytes 0.0 0 - 0.4 10e9/L    Absolute Nucleated RBC 0.0    Comprehensive metabolic panel   Result Value Ref Range    Sodium 135 133 - 144 mmol/L    Potassium 4.6 3.4 - 5.3 mmol/L    Chloride 102 94 - 109 mmol/L    Carbon Dioxide 26 20 - 32 mmol/L    Anion Gap 7 3 - 14 mmol/L    Glucose 98 70 - 99 mg/dL    Urea Nitrogen 14 7 - 30 mg/dL    Creatinine 0.91 0.66 - 1.25 mg/dL    GFR Estimate >90 >60 mL/min/[1.73_m2]    GFR Estimate If Black >90 >60 mL/min/[1.73_m2]    Calcium 8.9 8.5 - 10.1 mg/dL    Bilirubin Total 0.2 0.2 - 1.3 mg/dL    Albumin 3.1 (L) 3.4 - 5.0 g/dL    Protein Total 8.3 6.8 - 8.8 g/dL    Alkaline Phosphatase 87 40 - 150 U/L    ALT 32 0 - 70 U/L    AST 26 0 - 45 U/L   UA with Microscopic   Result Value Ref Range    Color Urine Yellow     Appearance Urine Clear      Glucose Urine Negative NEG^Negative mg/dL    Bilirubin Urine Negative NEG^Negative    Ketones Urine Negative NEG^Negative mg/dL    Specific Gravity Urine 1.018 1.003 - 1.035    Blood Urine Negative NEG^Negative    pH Urine 8.0 (H) 5.0 - 7.0 pH    Protein Albumin Urine 30 (A) NEG^Negative mg/dL    Urobilinogen mg/dL 4.0 (H) 0.0 - 2.0 mg/dL    Nitrite Urine Negative NEG^Negative    Leukocyte Esterase Urine Negative NEG^Negative    Source Catheterized Urine     WBC Urine 2 0 - 5 /HPF    RBC Urine 1 0 - 2 /HPF    Squamous Epithelial /HPF Urine <1 0 - 1 /HPF    Mucous Urine Present (A) NEG^Negative /LPF   Lactic acid whole blood   Result Value Ref Range    Lactic Acid 0.9 0.7 - 2.0 mmol/L       Treatments provided: IV fluids and antibiotic    Family Comments: Mom at bedside.    OBS brochure/video discussed/provided to patient/family: No              Name of person given brochure if not patient: N/A              Relationship to patient: N/A    ED Medications:   Medications   piperacillin-tazobactam (ZOSYN) 4.5 g vial to attach to  mL bag (4.5 g Intravenous New Bag 4/3/19 6026)   0.9% sodium chloride BOLUS (0 mLs Intravenous Stopped 4/3/19 8165)       Drips infusing?:  No    For the majority of the shift this patient was Green.   Interventions performed were N/A.    Severe Sepsis OR Septic Shock Diagnosis Present: No    To be done/followed up on inpatient unit:  N/A    ED NURSE PHONE NUMBER: [phone]

## 2019-04-03 NOTE — PHARMACY-ADMISSION MEDICATION HISTORY
"Admission medication history interview status for the 4/3/2019  admission is complete. See EPIC admission navigator for prior to admission medications     Medication history source reliability:Mother gave med list    Actions taken by pharmacist (provider contacted, etc):Called Georgina Manning on [address] to carley Glycopyrrolate  Additional medication history information not noted on PTA med list :None    Medication reconciliation/reorder completed by provider prior to medication history? No    Time spent in this activity: 25\"    Prior to Admission medications    Medication Sig Last Dose Taking? Auth Provider   acetaminophen (TYLENOL) 500 MG tablet 1,000 mg by Oral or FT or NG tube route every 6 hours as needed for mild pain 4/3/2019 at 1300 Yes Unknown, Entered By History   acetylcysteine (MUCOMYST) 20 % neb solution Take 2 mLs by nebulization 4 times daily  Patient taking differently: Take 2 mLs by nebulization 4 times daily With Albuterol at 07:00, 11:00, 15:00, 19:00 4/3/2019 at 1100 Yes Starr Champion MD   albuterol (PROVENTIL) (5 MG/ML) 0.5% neb solution Take 0.5 mLs (2.5 mg) by nebulization every 4 hours (while awake)  Patient taking differently: Take 2.5 mg by nebulization 4 times daily With Mucomyst at 07:00, 11:00, 15:00, 19:00. 4/3/2019 at 1100 Yes Starr Champion MD   amoxicillin-clavulanate (AUGMENTIN) 875-125 MG tablet Take 1 tablet by mouth every 12 hours 4/3/2019 at am Yes Washington Connors MD   Brivaracetam (BRIVIACT) 10 MG/ML solution 100 mg by Oral or FT or NG tube route 2 times daily @0900 and 2100 4/3/2019 at am Yes Reported, Patient   calcium carbonate 1250 (500 CA) MG/5ML SUSP suspension 5 mLs (1,250 mg) by Per J Tube route 3 times daily (with meals) 4/3/2019 at am Yes Mariana Venegas MD   carBAMazepine (TEGRETOL) 100 MG/5ML suspension Take 150 mg by mouth every 6 hours At 06:00, 12:00, 18:00 and 24:00 for seizures 4/3/2019 at 1200 Yes Unknown, Entered By History   ferrous " sulfate 220 (44 Fe) MG/5ML ELIX 220 mg by Per Feeding Tube route daily 4/3/2019 at am Yes Unknown, Entered By History   glycopyrrolate (GLYCATE) 2 MG tablet Take 1-2 mg by mouth 2 times daily as needed (secretions) 1/2 to 1 tablet (1 - 2 mg) up to twice daily if needed for secretions. 4/3/2019 at 1250 Yes Unknown, Entered By History   Guar Gum (FIBER MODULAR, NUTRISOURCE FIBER,) packet 1 packet by Per G Tube route daily 4/3/2019 at am Yes Starr Champion MD   hydrocortisone (CORTEF) 5 MG tablet 10 mg by Oral or FT or NG tube route daily (with dinner) At 1500 4/2/2019 at 1500 Yes Unknown, Entered By History   hydrocortisone (CORTEF) 5 MG tablet 20 mg by Oral or FT or NG tube route every morning  4/3/2019 at am Yes Unknown, Entered By History   levothyroxine (SYNTHROID/LEVOTHROID) 137 MCG tablet 137 mcg by Oral or FT or NG tube route daily @ 05:00 4/3/2019 at 0500 Yes Unknown, Entered By History   melatonin (MELATONIN) 1 MG/ML LIQD liquid 6 mg by Per NG tube route At Bedtime  4/2/2019 at hs Yes Unknown, Entered By History   Multiple Vitamins-Minerals (CENTRUM SILVER) per tablet Take 1 tablet by mouth daily Crush and feed via j-tube 4/3/2019 at am Yes Unknown, Entered By History   pantoprazole (PROTONIX) 2 mg/mL SUSP suspension 20 mLs (40 mg) by Per J Tube route daily 4/3/2019 at am Yes Washington Connors MD   potassium & sodium phosphates (NEUTRA-PHOS) 280-160-250 MG Packet Take 1 packet by mouth 3 times daily 0900, 1500, 2100.  4/3/2019 at am Yes Unknown, Entered By History   Vitamin D, Cholecalciferol, 1000 units TABS Take 2,000 Units by mouth every morning Crush and feed via j-tube 4/3/2019 at am Yes Unknown, Entered By History   bacitracin ointment Apply topically daily as needed for wound care To PEG site.  prn  Unknown, Entered By History   order for DME Equipment being ordered: Nebulizer   Starr Champion MD   testosterone cypionate (DEPOTESTOTERONE CYPIONATE) 200 MG/ML injection Inject 76 mg into the muscle  See Admin Instructions Every 2 weeks on Fridays   76 mg or 0.38 mL due this Friday  Unknown, Entered By History

## 2019-04-03 NOTE — PROGRESS NOTES
RECEIVING UNIT ED HANDOFF REVIEW    ED Nurse Handoff Report was reviewed by: Yue Baltazar on April 3, 2019 at 6:28 PM

## 2019-04-04 LAB
ANION GAP SERPL CALCULATED.3IONS-SCNC: 8 MMOL/L (ref 3–14)
BACTERIA SPEC CULT: NORMAL
BASOPHILS # BLD AUTO: 0.1 10E9/L (ref 0–0.2)
BASOPHILS NFR BLD AUTO: 1.1 %
BUN SERPL-MCNC: 12 MG/DL (ref 7–30)
CALCIUM SERPL-MCNC: 7.9 MG/DL (ref 8.5–10.1)
CHLORIDE SERPL-SCNC: 107 MMOL/L (ref 94–109)
CO2 SERPL-SCNC: 23 MMOL/L (ref 20–32)
CREAT SERPL-MCNC: 0.89 MG/DL (ref 0.66–1.25)
DIFFERENTIAL METHOD BLD: ABNORMAL
EOSINOPHIL # BLD AUTO: 0.5 10E9/L (ref 0–0.7)
EOSINOPHIL NFR BLD AUTO: 6.6 %
ERYTHROCYTE [DISTWIDTH] IN BLOOD BY AUTOMATED COUNT: 15.2 % (ref 10–15)
GFR SERPL CREATININE-BSD FRML MDRD: >90 ML/MIN/{1.73_M2}
GLUCOSE SERPL-MCNC: 152 MG/DL (ref 70–99)
HCT VFR BLD AUTO: 31.8 % (ref 40–53)
HGB BLD-MCNC: 10.5 G/DL (ref 13.3–17.7)
IMM GRANULOCYTES # BLD: 0 10E9/L (ref 0–0.4)
IMM GRANULOCYTES NFR BLD: 0.3 %
LYMPHOCYTES # BLD AUTO: 1 10E9/L (ref 0.8–5.3)
LYMPHOCYTES NFR BLD AUTO: 14 %
Lab: NORMAL
MCH RBC QN AUTO: 27.3 PG (ref 26.5–33)
MCHC RBC AUTO-ENTMCNC: 33 G/DL (ref 31.5–36.5)
MCV RBC AUTO: 83 FL (ref 78–100)
MONOCYTES # BLD AUTO: 0.6 10E9/L (ref 0–1.3)
MONOCYTES NFR BLD AUTO: 9.1 %
NEUTROPHILS # BLD AUTO: 4.8 10E9/L (ref 1.6–8.3)
NEUTROPHILS NFR BLD AUTO: 68.9 %
NRBC # BLD AUTO: 0 10*3/UL
NRBC BLD AUTO-RTO: 0 /100
PLATELET # BLD AUTO: 180 10E9/L (ref 150–450)
POTASSIUM SERPL-SCNC: 3.9 MMOL/L (ref 3.4–5.3)
PROCALCITONIN SERPL-MCNC: <0.05 NG/ML
RBC # BLD AUTO: 3.84 10E12/L (ref 4.4–5.9)
SODIUM SERPL-SCNC: 138 MMOL/L (ref 133–144)
SPECIMEN SOURCE: NORMAL
WBC # BLD AUTO: 7 10E9/L (ref 4–11)

## 2019-04-04 PROCEDURE — 27210429 ZZH NUTRITION PRODUCT INTERMEDIATE LITER

## 2019-04-04 PROCEDURE — 94640 AIRWAY INHALATION TREATMENT: CPT

## 2019-04-04 PROCEDURE — A9270 NON-COVERED ITEM OR SERVICE: HCPCS | Mod: GY | Performed by: HOSPITALIST

## 2019-04-04 PROCEDURE — 25800030 ZZH RX IP 258 OP 636: Performed by: HOSPITALIST

## 2019-04-04 PROCEDURE — 25000125 ZZHC RX 250: Performed by: HOSPITALIST

## 2019-04-04 PROCEDURE — 25000128 H RX IP 250 OP 636: Performed by: HOSPITALIST

## 2019-04-04 PROCEDURE — 80048 BASIC METABOLIC PNL TOTAL CA: CPT | Performed by: HOSPITALIST

## 2019-04-04 PROCEDURE — 85025 COMPLETE CBC W/AUTO DIFF WBC: CPT | Performed by: HOSPITALIST

## 2019-04-04 PROCEDURE — 94640 AIRWAY INHALATION TREATMENT: CPT | Mod: 76

## 2019-04-04 PROCEDURE — 40000275 ZZH STATISTIC RCP TIME EA 10 MIN

## 2019-04-04 PROCEDURE — 84145 PROCALCITONIN (PCT): CPT | Performed by: HOSPITALIST

## 2019-04-04 PROCEDURE — 12000000 ZZH R&B MED SURG/OB

## 2019-04-04 PROCEDURE — 99233 SBSQ HOSP IP/OBS HIGH 50: CPT | Performed by: HOSPITALIST

## 2019-04-04 PROCEDURE — 25000132 ZZH RX MED GY IP 250 OP 250 PS 637: Mod: GY | Performed by: HOSPITALIST

## 2019-04-04 PROCEDURE — 36415 COLL VENOUS BLD VENIPUNCTURE: CPT | Performed by: HOSPITALIST

## 2019-04-04 RX ADMIN — ALBUTEROL SULFATE 2.5 MG: 2.5 SOLUTION RESPIRATORY (INHALATION) at 10:54

## 2019-04-04 RX ADMIN — ACETYLCYSTEINE 2 ML: 200 SOLUTION ORAL; RESPIRATORY (INHALATION) at 19:44

## 2019-04-04 RX ADMIN — Medication 1 PACKET: at 09:54

## 2019-04-04 RX ADMIN — CARBAMAZEPINE 150 MG: 100 SUSPENSION ORAL at 21:17

## 2019-04-04 RX ADMIN — CARBAMAZEPINE 150 MG: 100 SUSPENSION ORAL at 09:52

## 2019-04-04 RX ADMIN — ENOXAPARIN SODIUM 40 MG: 40 INJECTION SUBCUTANEOUS at 21:18

## 2019-04-04 RX ADMIN — HYDROCORTISONE 10 MG: 10 TABLET ORAL at 17:52

## 2019-04-04 RX ADMIN — CARBAMAZEPINE 150 MG: 100 SUSPENSION ORAL at 01:38

## 2019-04-04 RX ADMIN — PIPERACILLIN SODIUM,TAZOBACTAM SODIUM 4.5 G: 4; .5 INJECTION, POWDER, FOR SOLUTION INTRAVENOUS at 17:51

## 2019-04-04 RX ADMIN — HYDROCORTISONE 20 MG: 20 TABLET ORAL at 09:53

## 2019-04-04 RX ADMIN — POTASSIUM & SODIUM PHOSPHATES POWDER PACK 280-160-250 MG 1 PACKET: 280-160-250 PACK at 17:52

## 2019-04-04 RX ADMIN — ALBUTEROL SULFATE 2.5 MG: 2.5 SOLUTION RESPIRATORY (INHALATION) at 15:34

## 2019-04-04 RX ADMIN — ACETYLCYSTEINE 2 ML: 200 SOLUTION ORAL; RESPIRATORY (INHALATION) at 10:53

## 2019-04-04 RX ADMIN — BRIVARACETAM 100 MG: 10 SOLUTION ORAL at 21:18

## 2019-04-04 RX ADMIN — POTASSIUM & SODIUM PHOSPHATES POWDER PACK 280-160-250 MG 1 PACKET: 280-160-250 PACK at 09:53

## 2019-04-04 RX ADMIN — PIPERACILLIN SODIUM,TAZOBACTAM SODIUM 4.5 G: 4; .5 INJECTION, POWDER, FOR SOLUTION INTRAVENOUS at 04:49

## 2019-04-04 RX ADMIN — BRIVARACETAM 100 MG: 10 SOLUTION ORAL at 09:53

## 2019-04-04 RX ADMIN — ALBUTEROL SULFATE 2.5 MG: 2.5 SOLUTION RESPIRATORY (INHALATION) at 07:12

## 2019-04-04 RX ADMIN — ACETYLCYSTEINE 2 ML: 200 SOLUTION ORAL; RESPIRATORY (INHALATION) at 15:33

## 2019-04-04 RX ADMIN — MINERAL SUPPLEMENT IRON 300 MG / 5 ML STRENGTH LIQUID 100 PER BOX UNFLAVORED 300 MG: at 09:53

## 2019-04-04 RX ADMIN — ALBUTEROL SULFATE 2.5 MG: 2.5 SOLUTION RESPIRATORY (INHALATION) at 19:44

## 2019-04-04 RX ADMIN — Medication 40 MG: at 10:12

## 2019-04-04 RX ADMIN — ACETYLCYSTEINE 2 ML: 200 SOLUTION ORAL; RESPIRATORY (INHALATION) at 07:11

## 2019-04-04 RX ADMIN — SODIUM CHLORIDE 75 ML/HR: 9 INJECTION, SOLUTION INTRAVENOUS at 10:04

## 2019-04-04 RX ADMIN — PIPERACILLIN SODIUM,TAZOBACTAM SODIUM 4.5 G: 4; .5 INJECTION, POWDER, FOR SOLUTION INTRAVENOUS at 11:12

## 2019-04-04 RX ADMIN — CARBAMAZEPINE 150 MG: 100 SUSPENSION ORAL at 14:33

## 2019-04-04 RX ADMIN — PIPERACILLIN SODIUM,TAZOBACTAM SODIUM 4.5 G: 4; .5 INJECTION, POWDER, FOR SOLUTION INTRAVENOUS at 23:35

## 2019-04-04 RX ADMIN — POTASSIUM & SODIUM PHOSPHATES POWDER PACK 280-160-250 MG 1 PACKET: 280-160-250 PACK at 21:18

## 2019-04-04 RX ADMIN — LEVOTHYROXINE SODIUM 137 MCG: 137 TABLET ORAL at 04:49

## 2019-04-04 ASSESSMENT — ACTIVITIES OF DAILY LIVING (ADL)
COGNITION: 1 - ATTENTION OR MEMORY DEFICITS
SWALLOWING: 2-->DIFFICULTY SWALLOWING LIQUIDS/FOODS
ADLS_ACUITY_SCORE: 45
ADLS_ACUITY_SCORE: 45
AMBULATION: 4-->COMPLETELY DEPENDENT
TRANSFERRING: 4-->COMPLETELY DEPENDENT
ADLS_ACUITY_SCORE: 47
FALL_HISTORY_WITHIN_LAST_SIX_MONTHS: NO
ADLS_ACUITY_SCORE: 45
ADLS_ACUITY_SCORE: 45
DRESS: 4-->COMPLETELY DEPENDENT
RETIRED_EATING: 4-->COMPLETELY DEPENDENT
ADLS_ACUITY_SCORE: 47
RETIRED_COMMUNICATION: 2-->DIFFICULTY UNDERSTANDING AND SPEAKING (NOT RELATED TO LANGUAGE BARRIER)
BATHING: 4-->COMPLETELY DEPENDENT
TOILETING: 4-->COMPLETELY DEPENDENT

## 2019-04-04 ASSESSMENT — MIFFLIN-ST. JEOR: SCORE: 1610.08

## 2019-04-04 NOTE — CONSULTS
"CLINICAL NUTRITION SERVICES  -  ASSESSMENT NOTE      Recommendations Ordered by Registered Dietitian (RD):   Isosource 1.5 at 100 mL/hr x 12 hours (7 am - 7 pm) to provide 1800 kcal (25 kcal/kg), 82g protein (1.1 gl/kg), 211g CHO, 18g fiber and 912mL water, 100% RDIs for vit/minerals   Standard H2O flushes of 60 mL q 4 hour; once off IVF, increase to 150 mL q 4 hours.     Malnutrition (4/4) :   % Weight Loss:  None noted  % Intake:  No decreased intake noted  Subcutaneous Fat Loss:  None observed  Muscle Loss:  None observed  Fluid Retention:  None noted    Malnutrition Diagnosis: Patient does not meet two of the above criteria necessary for diagnosing malnutrition          REASON FOR ASSESSMENT  Keyon Farias is a 58 year old male seen by Registered Dietitian for Provider Order - Registered Dietitian to Assess and Order TF per Medical Nutrition Therapy Protocol      NUTRITION HISTORY  - Information obtained from the EMR.   Pt is well-known to our service, he has been on TF via GJ tube since August 2016; last tube was placed 3/8/19 - 18Fr TORY GJ tube.  Per previous records pt's home TF regimen is as follows - Jevity 1.5 (5 to 5.5 cans daily) x 12 hours during the day to provide 4320-4920 kcal, 77-83g protein, ~260g CHO, ~27g fiber and ~900mL free water. H2O flushes 120 mL QID.  Pt lives with his mother, she likes to keep the TF rate at no more than 100 ml/hr. He is fed during the day rather than at night so that he can be up in the chair to prevent aspiration.   His home meds include Vit D, Nutrisource Fiber, Florastor, Neutraphos, and Centrum Silver vitamins.      CURRENT NUTRITION ORDERS  Diet Order:     NPO       NUTRITION FOCUSED PHYSICAL ASSESSMENT (NFPA)  Completed:       Yes  -  Visual Assessment only                 Observed      No nutrition-related physical findings   Obtained from Chart/Interdisciplinary Team      No nutrition-related physical findings noted          ANTHROPOMETRICS  Height: 6' 0\"  Weight: " 158 lbs 0 oz (71.7 kg) - 4/3  Body mass index is 21.43 kg/m .  Weight Status:  Normal BMI  IBW: 81 kg +/- 10%  % IBW: 88%  Weight History:  Currently at his usual wt.  Wt Readings from Last 10 Encounters:   04/03/19 71.7 kg (158 lb)   04/02/19 72 kg (158 lb 11.7 oz)   03/18/19 70.9 kg (156 lb 4.9 oz)   03/11/19 77.6 kg (171 lb)   03/04/19 72.6 kg (160 lb)   02/22/19 74.4 kg (164 lb 0.4 oz)   02/01/19 71.5 kg (157 lb 11.2 oz)   01/16/19 71.6 kg (157 lb 13.6 oz)   12/24/18 62 kg (136 lb 11 oz)   11/25/18 71.7 kg (158 lb 1.1 oz)         LABS  Labs reviewed    MEDICATIONS  NS @ 75 mL/hr  Nutrasource Fiber, 1 packet daily  Neutraphos      ASSESSED NUTRITION NEEDS PER APPROVED PRACTICE GUIDELINES:  Dosing Weight 71.7 kg - 4/3  Estimated Energy Needs: 7321-7431 kcals (25-30 Kcal/Kg)  Justification: maintenance  Estimated Protein Needs: 70-95 grams protein (1-1.3 g pro/Kg)  Justification: preservation of lean body mass  Estimated Fluid Needs: 1 mL/Kcal  Justification: maintenance    MALNUTRITION:  % Weight Loss:  None noted  % Intake:  No decreased intake noted  Subcutaneous Fat Loss:  None observed  Muscle Loss:  None observed  Fluid Retention:  None noted    Malnutrition Diagnosis: Patient does not meet two of the above criteria necessary for diagnosing malnutrition      NUTRITION DIAGNOSIS:  No nutrition diagnosis identified at this time       NUTRITION INTERVENTIONS  Recommendations / Nutrition Prescription  Isosource 1.5 at 100 mL/hr x 12 hours (7 am - 7 pm) to provide 1800 kcal (25 kcal/kg), 82g protein (1.1 gl/kg), 211g CHO, 18g fiber and 912mL water, 100% RDIs for vit/minerals   Standard H2O flushes of 60 mL q 4 hour; once off IVF, increase to 150 mL q 4 hours.      Implementation  Nutrition education: No education needs at this time  EN Schedule - entered above orders.      Nutrition Goals  EN will provide 100% of assessed needs  .      MONITORING AND EVALUATION:  Progress towards goals will be monitored and  evaluated per protocol and Practice Guidelines      Unique Schultz RD  Pager [phone] (M-F)            [phone] (W/E & Hol)

## 2019-04-04 NOTE — PLAN OF CARE
DATE & TIME: 4/4 Days  ORIENTATION: HECTOR, patient is non-verbal. Follows commands and responds to voice.   BEHAVIOR & AGGRESSION TOOL COLOR: Green  CIWA SCORE: n/a  ABNL VS/O2: on RA  MOBILITY: turn q2h with assist 2 and PRN, up with lift   PAIN MANAGMENT: No non-verbal indicators of pain, denies.  DIET: Strict NPO, nutrition to see today to start tube feedings -started at 100 ml/hr at 1000  BOWEL/BLADDER: Incontinent of both   ABNL LAB/BG: n/a  DRAIN/DEVICES: GJ tube, patent.  TELEMETRY RHYTHM: n/a  SKIN: Blanchable redness on coccyx, present on admission. Covered with mepilex, CDI.   TESTS/PROCEDURES: labs this afternoon  D/C DAY/GOALS/PLACE: pending   OTHER IMPORTANT INFO: Has been admitted multiple times recently. Lives at home with mother as primary caregiver. Dr. Connors text paged, Mother Savannah is here at bedside and he requested to speak with her when she arrives.

## 2019-04-04 NOTE — PROGRESS NOTES
[facility]  Patient is currently open to home care services with [facility]. The patient is currently receiving Skilled Nursing services and is scheduled to receive Speech therapy and PT evaluations. SLP and PT evaluations are on hold due to this hospitalization. [facility]  and team have been notified of patient admission. [facility] liaison will continue to follow patient during stay. If appropriate provide orders to resume home care at time of discharge.

## 2019-04-04 NOTE — PROGRESS NOTES
[facility]    Medicine Progress Note - Hospitalist Service       Date of Admission:  4/3/2019  Assessment & Plan   Mr. Keyon Farias is a 58-year-old male with a traumatic brain injury with resultant aphasia, spastic paraplegia, seizure, panhypopituitarism, GERD, and recurrent aspiration pneumonias, who was just in hospital from 03/30 to 04/02 and presents back with increased cough, copious secretions, and fever with concerns for aspiration pneumonia again.  This will be his seventh admission for similar presentation this year in 2019.     1.  Recurrent aspiration pneumonia versus pneumonitis.  We will admit him as inpatient.  He has had frequent recurrence events despite G-tube feedings and aspiration precautions.  Historically, he recovered.  She is quite quickly from febrile events following aspiration, potentially suggesting pneumonitis rather than pneumonia.  He has been evaluated by Infectious Disease on several occasions.  We will start him on Zosyn again.  We will monitor for fever.  We will have aspiration precautions, n.p.o. including medications, and feeding will be through G-tube, G-tube also used for medications.  We will resume his PTA albuterol nebs, Mucomyst nebs.   - 4/4 on RA, continuing zosyn IV for now  - at mother's request, will try small dose of glycopyrrolate as she says he benefited greatly from it in regards to decreasing secretions    2.  Nutrition:  N.p.o., including for medications and G-tube feeding.  We will have Nutrition consult for initiation of tube feeding.  We will have Protonix.  We will also start him on normal saline at 75 mL per hour.     3.  Traumatic brain injury with sequelae of aphasia and spastic paralysis.  He gets home care services.  We will consult  for disposition planning.     4.  Seizure disorder.  Resume prior to admission, Tegretol and levetiracetam when verified by Pharmacy.     5.  Panhypopituitarism.  Resume PTA hydrocortisone 20 mg  "in morning and 10 mg nightly, not currently septic.  I do not see a need for stress dose steroids at this time.     6.  Hypothyroidism.  Continue PTA Synthroid.     7.  Hypogonadotropic hypogonadism.  Resume PTA Testosterone injection in the outpatient setting.       Diet: NPO for Medical/Clinical Reasons Except for: No Exceptions  Adult Formula Drip Feeding: Continuous Isosource 1.5; Jejunostomy; Goal Rate: 100; mL/hr; From: 7:00 AM; 7:00 PM; Medication - Feeding Tube Flush Frequency: At least 15-30 mL water before and after medication administration and with tube clogging;...    DVT Prophylaxis: Enoxaparin (Lovenox) SQ  Lerma Catheter: not present  Code Status: Full Code      Disposition Plan   Expected discharge: Unclear, repeated admissions with readmissions. 2-3 days.      Entered: Washington Connors MD 04/04/2019, 1:13 PM       The patient's care was discussed with the Bedside Nurse, Patient and Patient's Family.    Washington Connors MD  Hospitalist Service  [facility]    ______________________________________________________________________    Interval History   Aravind was happy to see me and was afebrile breathing on room air. He communicated quite well that he has no pain or trouble breathing.     *addendum: I had 45 min discussion with Savannah, Aravind's mom, regarding the overall picture and continued readmissions. To summarize, she is vehemently against even hearing the word \"palliative\" and gets uncomfortable with the thought. I repeatedly reiterated that it is NOT a commitment to comfort cares.  She feels that aravind is her baby and her entire life and will not consider ever \"giving up\" no matter. I explained again, that it is not about giving up but rather having a plan and ensuring that Aravind's (and her) quality of life is kept in mind as well. She was appreciative of my time, but ultimately does not want to even consider as much as a simple meeting/discussion of advance directives or " anything in that regard.    Data reviewed today: I reviewed all medications, new labs and imaging results over the last 24 hours. I personally reviewed no images or EKG's today.    Physical Exam   Vital Signs: Temp: 98.1  F (36.7  C) Temp src: Oral BP: 120/68 Pulse: 67 Heart Rate: 73 Resp: 16 SpO2: 95 % O2 Device: None (Room air) Oxygen Delivery: 1 LPM  Weight: 163 lbs 9.6 oz    Gen: NAD, pleasant, at baseline  HEENT: Normocephalic, EOMI, MMM  Resp: no crackles or wheezes, no increased work of resp  CV: S1S2 heard, reg rhythm, reg rate, no pedal edema  Abdo: soft, nontender, nondistended, bowel sounds present  Ext: calves nontender, well perfused  Neuro: AA, tracks visually, nonverbal at baseline, points finger and squeezes hand      Data   Recent Labs   Lab 04/04/19  1312 04/03/19  1355 04/02/19  0821  03/30/19 2000   WBC 7.0 10.2 10.1   < > 14.5*   HGB 10.5* 12.1* 10.7*   < > 12.9*   MCV 83 84 84   < > 84    260 188   < > 233    135 137   < > 139   POTASSIUM 3.9 4.6 3.6   < > 4.1   CHLORIDE 107 102 104   < > 101   CO2 23 26 27   < > 29   BUN 12 14 12   < > 14   CR 0.89 0.91 0.99   < > 0.82   ANIONGAP 8 7 6   < > 9   STEVE 7.9* 8.9 8.3*   < > 8.8   * 98 160*   < > 142*   ALBUMIN  --  3.1*  --   --  3.2*   PROTTOTAL  --  8.3  --   --  8.6   BILITOTAL  --  0.2  --   --  0.2   ALKPHOS  --  87  --   --  107   ALT  --  32  --   --  40   AST  --  26  --   --  22    < > = values in this interval not displayed.     No results found for this or any previous visit (from the past 24 hour(s)).

## 2019-04-04 NOTE — ED PROVIDER NOTES
Emergency Department Attending Supervision Note  4/3/2019  7:08 PM      I evaluated this patient in conjunction with Nathalie Ayala CNP.      Briefly, the patient presented to the ED with what appears to be new fever, new oxygen requirements after being discharged with pneumonia. This may be because he is not actually treated or covered on oral meds, but he was doing well on IV meds. Seen in conjunction with provider Nathalie Ayala, and the decision was made to admit him, which I do support. I support it mainly as the patient has O2 saturations of 90% on room air, and has a new finding on his X-ray, leading me to believe he is not an ideal outpatient candidate on his current regimen, as his trajectory is already suboptimized, and he was just discharged. Will plan for admission to the care of Dr. Galan, as above.      Diagnosis    ICD-10-CM    1. Aspiration pneumonitis (H) J69.0 Blood culture     Blood culture         I, Dalton Mata, am serving as a scribe on 4/3/2019 at 7:08 PM to personally document services performed by Mark Moyer* based on my observations and the provider's statements to me.      Mark Moyer MD  04/03/19 2151

## 2019-04-04 NOTE — PLAN OF CARE
DATE & TIME: 4/4 Nights  ORIENTATION: HECTOR, patient is non-verbal. Follows commands and responds to voice.   BEHAVIOR & AGGRESSION TOOL COLOR: Green  CIWA SCORE: n/a  ABNL VS/O2: on RA  MOBILITY: turn q2h, up with lift   PAIN MANAGMENT: No non-verbal indicators of pain   DIET: Strict NPO, nutrition to see today to start tube feedings   BOWEL/BLADDER: Incontinent of both   ABNL LAB/BG: n/a  DRAIN/DEVICES: GJ tube, patent.  TELEMETRY RHYTHM: n/a  SKIN: Blanchable redness on coccyx, present on admission. Covered with mepilex, CDI.   TESTS/PROCEDURES:   D/C DAY/GOALS/PLACE: pending   OTHER IMPORTANT INFO: Has been admitted multiple times recently. Lives at home with mother as primary caregiver.

## 2019-04-05 ENCOUNTER — APPOINTMENT (OUTPATIENT)
Dept: CARDIOLOGY | Facility: CLINIC | Age: 59
DRG: 178 | End: 2019-04-05
Attending: INTERNAL MEDICINE
Payer: MEDICARE

## 2019-04-05 LAB
ERYTHROCYTE [DISTWIDTH] IN BLOOD BY AUTOMATED COUNT: 15.5 % (ref 10–15)
GLUCOSE BLDC GLUCOMTR-MCNC: 124 MG/DL (ref 70–99)
HCT VFR BLD AUTO: 34.7 % (ref 40–53)
HGB BLD-MCNC: 11.4 G/DL (ref 13.3–17.7)
MCH RBC QN AUTO: 27.4 PG (ref 26.5–33)
MCHC RBC AUTO-ENTMCNC: 32.9 G/DL (ref 31.5–36.5)
MCV RBC AUTO: 83 FL (ref 78–100)
NT-PROBNP SERPL-MCNC: 400 PG/ML (ref 0–900)
PLATELET # BLD AUTO: 204 10E9/L (ref 150–450)
RBC # BLD AUTO: 4.16 10E12/L (ref 4.4–5.9)
WBC # BLD AUTO: 6 10E9/L (ref 4–11)

## 2019-04-05 PROCEDURE — 85027 COMPLETE CBC AUTOMATED: CPT | Performed by: HOSPITALIST

## 2019-04-05 PROCEDURE — 40000264 ECHOCARDIOGRAM COMPLETE

## 2019-04-05 PROCEDURE — 94640 AIRWAY INHALATION TREATMENT: CPT

## 2019-04-05 PROCEDURE — 36415 COLL VENOUS BLD VENIPUNCTURE: CPT | Performed by: INTERNAL MEDICINE

## 2019-04-05 PROCEDURE — 25000132 ZZH RX MED GY IP 250 OP 250 PS 637: Mod: GY | Performed by: HOSPITALIST

## 2019-04-05 PROCEDURE — 99233 SBSQ HOSP IP/OBS HIGH 50: CPT | Performed by: INTERNAL MEDICINE

## 2019-04-05 PROCEDURE — 25000128 H RX IP 250 OP 636: Performed by: HOSPITALIST

## 2019-04-05 PROCEDURE — A9270 NON-COVERED ITEM OR SERVICE: HCPCS | Mod: GY | Performed by: INTERNAL MEDICINE

## 2019-04-05 PROCEDURE — 25000125 ZZHC RX 250: Performed by: HOSPITALIST

## 2019-04-05 PROCEDURE — 40000275 ZZH STATISTIC RCP TIME EA 10 MIN

## 2019-04-05 PROCEDURE — A9270 NON-COVERED ITEM OR SERVICE: HCPCS | Mod: GY | Performed by: HOSPITALIST

## 2019-04-05 PROCEDURE — 12000000 ZZH R&B MED SURG/OB

## 2019-04-05 PROCEDURE — 36415 COLL VENOUS BLD VENIPUNCTURE: CPT | Performed by: HOSPITALIST

## 2019-04-05 PROCEDURE — 94640 AIRWAY INHALATION TREATMENT: CPT | Mod: 76

## 2019-04-05 PROCEDURE — 83880 ASSAY OF NATRIURETIC PEPTIDE: CPT | Performed by: INTERNAL MEDICINE

## 2019-04-05 PROCEDURE — 93306 TTE W/DOPPLER COMPLETE: CPT | Mod: 26 | Performed by: INTERNAL MEDICINE

## 2019-04-05 PROCEDURE — 27210437 ZZH NUTRITION PRODUCT SEMIELEM INTERMED LITER

## 2019-04-05 PROCEDURE — 00000146 ZZHCL STATISTIC GLUCOSE BY METER IP

## 2019-04-05 PROCEDURE — 25000132 ZZH RX MED GY IP 250 OP 250 PS 637: Mod: GY | Performed by: INTERNAL MEDICINE

## 2019-04-05 PROCEDURE — 25500064 ZZH RX 255 OP 636: Performed by: HOSPITALIST

## 2019-04-05 RX ORDER — GLYCOPYRROLATE 1 MG/5ML
0.2 SOLUTION ORAL 2 TIMES DAILY
Status: DISCONTINUED | OUTPATIENT
Start: 2019-04-05 | End: 2019-04-08 | Stop reason: HOSPADM

## 2019-04-05 RX ORDER — FUROSEMIDE 40 MG
40 TABLET ORAL DAILY
Status: DISCONTINUED | OUTPATIENT
Start: 2019-04-05 | End: 2019-04-06

## 2019-04-05 RX ORDER — GLYCOPYRROLATE 1 MG/5ML
0.2 SOLUTION ORAL 3 TIMES DAILY
Status: DISCONTINUED | OUTPATIENT
Start: 2019-04-05 | End: 2019-04-05

## 2019-04-05 RX ORDER — FUROSEMIDE 20 MG
20 TABLET ORAL DAILY
Status: DISCONTINUED | OUTPATIENT
Start: 2019-04-05 | End: 2019-04-05

## 2019-04-05 RX ADMIN — CARBAMAZEPINE 150 MG: 100 SUSPENSION ORAL at 08:14

## 2019-04-05 RX ADMIN — ACETYLCYSTEINE 2 ML: 200 SOLUTION ORAL; RESPIRATORY (INHALATION) at 11:29

## 2019-04-05 RX ADMIN — PIPERACILLIN SODIUM,TAZOBACTAM SODIUM 4.5 G: 4; .5 INJECTION, POWDER, FOR SOLUTION INTRAVENOUS at 22:33

## 2019-04-05 RX ADMIN — MINERAL SUPPLEMENT IRON 300 MG / 5 ML STRENGTH LIQUID 100 PER BOX UNFLAVORED 300 MG: at 08:14

## 2019-04-05 RX ADMIN — ALBUTEROL SULFATE 2.5 MG: 2.5 SOLUTION RESPIRATORY (INHALATION) at 11:29

## 2019-04-05 RX ADMIN — CARBAMAZEPINE 150 MG: 100 SUSPENSION ORAL at 02:07

## 2019-04-05 RX ADMIN — Medication 1 PACKET: at 10:44

## 2019-04-05 RX ADMIN — GLYCOPYRROLATE 0.2 MG: 1 LIQUID ORAL at 14:02

## 2019-04-05 RX ADMIN — ACETYLCYSTEINE 4 ML: 200 SOLUTION ORAL; RESPIRATORY (INHALATION) at 16:06

## 2019-04-05 RX ADMIN — PIPERACILLIN SODIUM,TAZOBACTAM SODIUM 4.5 G: 4; .5 INJECTION, POWDER, FOR SOLUTION INTRAVENOUS at 10:30

## 2019-04-05 RX ADMIN — HYDROCORTISONE 10 MG: 10 TABLET ORAL at 16:34

## 2019-04-05 RX ADMIN — POTASSIUM & SODIUM PHOSPHATES POWDER PACK 280-160-250 MG 1 PACKET: 280-160-250 PACK at 22:33

## 2019-04-05 RX ADMIN — LEVOTHYROXINE SODIUM 137 MCG: 137 TABLET ORAL at 05:49

## 2019-04-05 RX ADMIN — ALBUTEROL SULFATE 2.5 MG: 2.5 SOLUTION RESPIRATORY (INHALATION) at 07:54

## 2019-04-05 RX ADMIN — PIPERACILLIN SODIUM,TAZOBACTAM SODIUM 4.5 G: 4; .5 INJECTION, POWDER, FOR SOLUTION INTRAVENOUS at 05:50

## 2019-04-05 RX ADMIN — BRIVARACETAM 100 MG: 10 SOLUTION ORAL at 22:31

## 2019-04-05 RX ADMIN — HUMAN ALBUMIN MICROSPHERES AND PERFLUTREN 9 ML: 10; .22 INJECTION, SOLUTION INTRAVENOUS at 15:24

## 2019-04-05 RX ADMIN — ALBUTEROL SULFATE 2.5 MG: 2.5 SOLUTION RESPIRATORY (INHALATION) at 19:22

## 2019-04-05 RX ADMIN — ALBUTEROL SULFATE 2.5 MG: 2.5 SOLUTION RESPIRATORY (INHALATION) at 16:05

## 2019-04-05 RX ADMIN — ACETYLCYSTEINE 2 ML: 200 SOLUTION ORAL; RESPIRATORY (INHALATION) at 07:54

## 2019-04-05 RX ADMIN — HYDROCORTISONE 20 MG: 20 TABLET ORAL at 08:14

## 2019-04-05 RX ADMIN — PIPERACILLIN SODIUM,TAZOBACTAM SODIUM 4.5 G: 4; .5 INJECTION, POWDER, FOR SOLUTION INTRAVENOUS at 16:33

## 2019-04-05 RX ADMIN — CARBAMAZEPINE 150 MG: 100 SUSPENSION ORAL at 20:03

## 2019-04-05 RX ADMIN — FUROSEMIDE 40 MG: 40 TABLET ORAL at 10:30

## 2019-04-05 RX ADMIN — Medication 40 MG: at 10:30

## 2019-04-05 RX ADMIN — POTASSIUM & SODIUM PHOSPHATES POWDER PACK 280-160-250 MG 1 PACKET: 280-160-250 PACK at 08:14

## 2019-04-05 RX ADMIN — CARBAMAZEPINE 150 MG: 100 SUSPENSION ORAL at 14:02

## 2019-04-05 RX ADMIN — GLYCOPYRROLATE 0.2 MG: 1 LIQUID ORAL at 22:30

## 2019-04-05 RX ADMIN — ENOXAPARIN SODIUM 40 MG: 40 INJECTION SUBCUTANEOUS at 20:00

## 2019-04-05 RX ADMIN — BRIVARACETAM 100 MG: 10 SOLUTION ORAL at 08:15

## 2019-04-05 RX ADMIN — ACETYLCYSTEINE 2 ML: 200 SOLUTION ORAL; RESPIRATORY (INHALATION) at 19:22

## 2019-04-05 RX ADMIN — POTASSIUM & SODIUM PHOSPHATES POWDER PACK 280-160-250 MG 1 PACKET: 280-160-250 PACK at 16:34

## 2019-04-05 ASSESSMENT — ACTIVITIES OF DAILY LIVING (ADL)
ADLS_ACUITY_SCORE: 43
ADLS_ACUITY_SCORE: 47
ADLS_ACUITY_SCORE: 43

## 2019-04-05 NOTE — PLAN OF CARE
ORIENTATION: HECTOR, patient is non-verbal. Follows commands, is pleasant and responds to voice.   BEHAVIOR & AGGRESSION TOOL COLOR: Green  ABNL VS/O2: VSS on RA, cough without productive sputum. LS crackly  MOBILITY: turn q2h with assist 2 and PRN, up with lift   PAIN MANAGMENT: No non-verbal indicators of pain, denies.  DIET: Strict NPO, tube feeding ran from ~10AM to 9pm, regular schedule is 7 to 7.  BOWEL/BLADDER: Incontinent of both, likes to wipe himself, can use urinal at times with help.  ABNL LAB/BG: c-xray from yesterday with worsening infection possible.  DRAIN/DEVICES: GJ tube, patent., re-dressed this adarsh. IV is SL, on IV Zosyn, blood cultures pending.  SKIN: Blanchable redness on coccyx, present on admission. Covered with mepilex, CDI.   TESTS/PROCEDURES:n/a  D/C DAY/GOALS/PLACE: pending   OTHER IMPORTANT INFO: Has been admitted multiple times recently. Lives at home with mother as primary caregiver.Per MD note, mother is very against palliative consult or the mentioning of such and does not appear to understand the full difference between hospice and palliative, at this time doesn't want a consult from them or any more information on this.

## 2019-04-05 NOTE — PLAN OF CARE
DATE & TIME: 4/5/19 7 to1930  ORIENTATION: to self and place  BEHAVIOR & AGGRESSION TOOL COLOR: calm, cooperative, green   CIWA SCORE:  ABNL VS/O2:  MOBILITY: turn with assist of 2  PAIN MANAGMENT: denies   DIET: tube feeding per J tube 7AM-7PM  BOWEL/BLADDER: Incontinent   ABNL LAB/BG:  DRAIN/DEVICES: G- Jtube , PIV  SKIN: coccyx red/ blanchable mepilex on   TESTS/PROCEDURES:ECH done  D/C DAY/GOALS/PLACE: Discharge pending  OTHER IMPORTANT INFO: continues on IV abx, congested cough, on scheduled nebs, started Po lasix .

## 2019-04-05 NOTE — PROGRESS NOTES
[facility]    Medicine Progress Note - Hospitalist Service       Date of Admission:  4/3/2019  Assessment & Plan   Mr. Keyon Farias is a 58-year-old male with a traumatic brain injury with resultant aphasia, spastic paraplegia, seizure, panhypopituitarism, GERD, and recurrent aspiration pneumonias, who was just in hospital from 03/30 to 04/02 and presents back with increased cough, copious secretions, and fever with concerns for aspiration pneumonia again.  This will be his seventh admission for similar presentation this year in 2019.     1.  Recurrent aspiration pneumonia versus pneumonitis.  We will admit him as inpatient.  He has had frequent recurrence events despite G-tube feedings and aspiration precautions.  Historically, he recovered.  She is quite quickly from febrile events following aspiration, potentially suggesting pneumonitis rather than pneumonia.  He has been evaluated by Infectious Disease on several occasions.  We will start him on Zosyn again.  We will monitor for fever.  We will have aspiration precautions, n.p.o. including medications, and feeding will be through G-tube, G-tube also used for medications.  We will resume his PTA albuterol nebs, Mucomyst nebs.   - 4/4 on RA, continuing zosyn IV for now  - at mother's request, will try small dose of glycopyrrolate as she says he benefited greatly from it in regards to decreasing secretions  Started him on glycopyrrolate 0.2mg sublingual BID  Today. Discussed with mother about starting this medication   Fluid overload could be part of his prsentation as he gets tube feeds 100ml/hr for 12hours and tube flushes 150ml q 4hours   Started lasix 40mg PO daily today to keep him euvolemic   Echo ordered this hospitalization and pending     Pt was seen by CT surgery in the past with  for possib;le trach to prevent recurrent aspiration CT surgery recommended against it     2.  Nutrition:  N.p.o., including for medications and G-tube  "feeding.  We will have Nutrition consult for initiation of tube feeding.  We will have Protonix.    Stopped IVF     3.  Traumatic brain injury with sequelae of aphasia and spastic paralysis.  He gets home care services.  We will consult  for disposition planning.     4.  Seizure disorder.  Resume prior to admission, Tegretol and levetiracetam when verified by Pharmacy.     5.  Panhypopituitarism.  Resume PTA hydrocortisone 20 mg in morning and 10 mg nightly, not currently septic.  I do not see a need for stress dose steroids at this time.     6.  Hypothyroidism.  Continue PTA Synthroid.     7.  Hypogonadotropic hypogonadism.  Resume PTA Testosterone injection in the outpatient setting.       Diet: NPO for Medical/Clinical Reasons Except for: No Exceptions  Adult Formula Drip Feeding: Continuous Isosource 1.5; Jejunostomy; Goal Rate: 100; mL/hr; From: 7:00 AM; 7:00 PM; Medication - Feeding Tube Flush Frequency: At least 15-30 mL water before and after medication administration and with tube clogging;...    DVT Prophylaxis: Enoxaparin (Lovenox) SQ  Lerma Catheter: not present  Code Status: Full Code      Disposition Plan   Expected discharge: Unclear, repeated admissions with readmissions. 2-3 days.      Entered: Starr Champion MD 04/05/2019, 12:25 PM       The patient's care was discussed with the Bedside Nurse, Patient and Patient's Family.    Starr Champion MD  Hospitalist Service  [facility]    ______________________________________________________________________    Interval History   Keyon was happy to see me and was afebrile breathing on room air. He communicated quite well that he has no pain or trouble breathing.     *addendum: Dr.Andrew Connors had 45 min discussion with Melyssa 4/3/19, Keyon's mom, regarding the overall picture and continued readmissions. To summarize, she is vehemently against even hearing the word \"palliative\" and gets uncomfortable with the thought. I " "repeatedly reiterated that it is NOT a commitment to comfort cares.  She feels that aravind is her baby and her entire life and will not consider ever \"giving up\" no matter. I explained again, that it is not about giving up but rather having a plan and ensuring that Aravind's (and her) quality of life is kept in mind as well. She was appreciative of my time, but ultimately does not want to even consider as much as a simple meeting/discussion of advance directives or anything in that regard.    Data reviewed today: I reviewed all medications, new labs and imaging results over the last 24 hours. I personally reviewed no images or EKG's today.    Physical Exam   Vital Signs: Temp: 98  F (36.7  C) Temp src: Oral BP: 111/61 Pulse: 86 Heart Rate: 73 Resp: 16 SpO2: 92 % O2 Device: None (Room air)    Weight: 163 lbs 9.6 oz    Gen: NAD, pleasant, at baseline  HEENT: Normocephalic, EOMI, MMM  Resp:bilateral diffuse crackles heard on auscultation   CV: S1S2 heard, reg rhythm, reg rate, no pedal edema  Abdo: soft, nontender, nondistended, bowel sounds present  Ext: calves nontender, well perfused  Neuro: AA, tracks visually, nonverbal at baseline      Data   Recent Labs   Lab 04/05/19  0751 04/04/19  1312 04/03/19  1355 04/02/19  0821  03/30/19 2000   WBC 6.0 7.0 10.2 10.1   < > 14.5*   HGB 11.4* 10.5* 12.1* 10.7*   < > 12.9*   MCV 83 83 84 84   < > 84    180 260 188   < > 233   NA  --  138 135 137   < > 139   POTASSIUM  --  3.9 4.6 3.6   < > 4.1   CHLORIDE  --  107 102 104   < > 101   CO2  --  23 26 27   < > 29   BUN  --  12 14 12   < > 14   CR  --  0.89 0.91 0.99   < > 0.82   ANIONGAP  --  8 7 6   < > 9   STEVE  --  7.9* 8.9 8.3*   < > 8.8   GLC  --  152* 98 160*   < > 142*   ALBUMIN  --   --  3.1*  --   --  3.2*   PROTTOTAL  --   --  8.3  --   --  8.6   BILITOTAL  --   --  0.2  --   --  0.2   ALKPHOS  --   --  87  --   --  107   ALT  --   --  32  --   --  40   AST  --   --  26  --   --  22    < > = values in this interval " not displayed.     No results found for this or any previous visit (from the past 24 hour(s)).

## 2019-04-05 NOTE — CONSULTS
Care Coordinator was involved with pt on his last discharge with readmission within 24hrs.  Difficult to have a better plan in place to prevent readmission due to pt's fragile condition and his mother not wanting to have a more Palliative treatment plan.  This is pt's 8th admission this year.  Patient currently has the following services:  [facility] RN plus 12hr RN coverage 7am-7pm through [facility] ([phone]) and 12hr PCA for the adarsh and night hours through [facility] ([phone]).  His mother is one of his PCAs. Pt receives tube feeding supplies from [facility].     Pt's Mother Savannah typically wants stretcher transport through [facility] back home.    PCP follow up was scheduled on last admission for 4/9/19   .  Patient anticipates needs for home equipment: No  Plan/Disposition: Home     Care Transitions will continue to follow.

## 2019-04-05 NOTE — PLAN OF CARE
DATE & TIME: 4/5/19   ORIENTATION: to self and place  BEHAVIOR & AGGRESSION TOOL COLOR: calm, cooperative, green   CIWA SCORE:  ABNL VS/O2:  MOBILITY: turn with assist of 2  PAIN MANAGMENT: denies   DIET: tube feeding per J tube 7AM-7PM  BOWEL/BLADDER: Incontinent   ABNL LAB/BG:  DRAIN/DEVICES: G-tube   TELEMETRY RHYTHM:  SKIN: coccyx red/ blanchable mepilex on   TESTS/PROCEDURES:  D/C DAY/GOALS/PLACE: Discharge pending  OTHER IMPORTANT INFO:

## 2019-04-06 LAB
ANION GAP SERPL CALCULATED.3IONS-SCNC: 7 MMOL/L (ref 3–14)
BACTERIA SPEC CULT: NO GROWTH
BACTERIA SPEC CULT: NO GROWTH
BUN SERPL-MCNC: 14 MG/DL (ref 7–30)
CALCIUM SERPL-MCNC: 8.8 MG/DL (ref 8.5–10.1)
CHLORIDE SERPL-SCNC: 105 MMOL/L (ref 94–109)
CO2 SERPL-SCNC: 29 MMOL/L (ref 20–32)
CREAT SERPL-MCNC: 1.01 MG/DL (ref 0.66–1.25)
CREAT SERPL-MCNC: 1.06 MG/DL (ref 0.66–1.25)
GFR SERPL CREATININE-BSD FRML MDRD: 78 ML/MIN/{1.73_M2}
GFR SERPL CREATININE-BSD FRML MDRD: 82 ML/MIN/{1.73_M2}
GLUCOSE BLDC GLUCOMTR-MCNC: 81 MG/DL (ref 70–99)
GLUCOSE SERPL-MCNC: 164 MG/DL (ref 70–99)
Lab: NORMAL
Lab: NORMAL
PLATELET # BLD AUTO: 251 10E9/L (ref 150–450)
POTASSIUM SERPL-SCNC: 3.2 MMOL/L (ref 3.4–5.3)
POTASSIUM SERPL-SCNC: 4.4 MMOL/L (ref 3.4–5.3)
SODIUM SERPL-SCNC: 141 MMOL/L (ref 133–144)
SPECIMEN SOURCE: NORMAL
SPECIMEN SOURCE: NORMAL

## 2019-04-06 PROCEDURE — 82565 ASSAY OF CREATININE: CPT | Performed by: HOSPITALIST

## 2019-04-06 PROCEDURE — A9270 NON-COVERED ITEM OR SERVICE: HCPCS | Mod: GY | Performed by: HOSPITALIST

## 2019-04-06 PROCEDURE — 94640 AIRWAY INHALATION TREATMENT: CPT

## 2019-04-06 PROCEDURE — 80048 BASIC METABOLIC PNL TOTAL CA: CPT | Performed by: HOSPITALIST

## 2019-04-06 PROCEDURE — 94640 AIRWAY INHALATION TREATMENT: CPT | Mod: 76

## 2019-04-06 PROCEDURE — 12000000 ZZH R&B MED SURG/OB

## 2019-04-06 PROCEDURE — 36415 COLL VENOUS BLD VENIPUNCTURE: CPT | Performed by: HOSPITALIST

## 2019-04-06 PROCEDURE — 25000132 ZZH RX MED GY IP 250 OP 250 PS 637: Mod: GY | Performed by: HOSPITALIST

## 2019-04-06 PROCEDURE — 27210429 ZZH NUTRITION PRODUCT INTERMEDIATE LITER

## 2019-04-06 PROCEDURE — 25000132 ZZH RX MED GY IP 250 OP 250 PS 637: Mod: GY | Performed by: INTERNAL MEDICINE

## 2019-04-06 PROCEDURE — 99233 SBSQ HOSP IP/OBS HIGH 50: CPT | Performed by: HOSPITALIST

## 2019-04-06 PROCEDURE — 40000275 ZZH STATISTIC RCP TIME EA 10 MIN

## 2019-04-06 PROCEDURE — 84132 ASSAY OF SERUM POTASSIUM: CPT | Performed by: HOSPITALIST

## 2019-04-06 PROCEDURE — 00000146 ZZHCL STATISTIC GLUCOSE BY METER IP

## 2019-04-06 PROCEDURE — 25000128 H RX IP 250 OP 636: Performed by: HOSPITALIST

## 2019-04-06 PROCEDURE — 85049 AUTOMATED PLATELET COUNT: CPT | Performed by: HOSPITALIST

## 2019-04-06 PROCEDURE — 25000125 ZZHC RX 250: Performed by: HOSPITALIST

## 2019-04-06 RX ORDER — POTASSIUM CHLORIDE 29.8 MG/ML
20 INJECTION INTRAVENOUS
Status: DISCONTINUED | OUTPATIENT
Start: 2019-04-06 | End: 2019-04-08 | Stop reason: HOSPADM

## 2019-04-06 RX ORDER — POTASSIUM CHLORIDE 1.5 G/1.58G
20-40 POWDER, FOR SOLUTION ORAL
Status: DISCONTINUED | OUTPATIENT
Start: 2019-04-06 | End: 2019-04-08 | Stop reason: HOSPADM

## 2019-04-06 RX ORDER — POTASSIUM CHLORIDE 1500 MG/1
20-40 TABLET, EXTENDED RELEASE ORAL
Status: DISCONTINUED | OUTPATIENT
Start: 2019-04-06 | End: 2019-04-08 | Stop reason: HOSPADM

## 2019-04-06 RX ORDER — POTASSIUM CL/LIDO/0.9 % NACL 10MEQ/0.1L
10 INTRAVENOUS SOLUTION, PIGGYBACK (ML) INTRAVENOUS
Status: DISCONTINUED | OUTPATIENT
Start: 2019-04-06 | End: 2019-04-08 | Stop reason: HOSPADM

## 2019-04-06 RX ORDER — POTASSIUM CHLORIDE 7.45 MG/ML
10 INJECTION INTRAVENOUS
Status: DISCONTINUED | OUTPATIENT
Start: 2019-04-06 | End: 2019-04-08 | Stop reason: HOSPADM

## 2019-04-06 RX ADMIN — POTASSIUM & SODIUM PHOSPHATES POWDER PACK 280-160-250 MG 1 PACKET: 280-160-250 PACK at 08:03

## 2019-04-06 RX ADMIN — ALBUTEROL SULFATE 2.5 MG: 2.5 SOLUTION RESPIRATORY (INHALATION) at 19:47

## 2019-04-06 RX ADMIN — HYDROCORTISONE 10 MG: 10 TABLET ORAL at 16:51

## 2019-04-06 RX ADMIN — CARBAMAZEPINE 150 MG: 100 SUSPENSION ORAL at 21:00

## 2019-04-06 RX ADMIN — MINERAL SUPPLEMENT IRON 300 MG / 5 ML STRENGTH LIQUID 100 PER BOX UNFLAVORED 300 MG: at 08:01

## 2019-04-06 RX ADMIN — ALBUTEROL SULFATE 2.5 MG: 2.5 SOLUTION RESPIRATORY (INHALATION) at 07:43

## 2019-04-06 RX ADMIN — ACETYLCYSTEINE 2 ML: 200 SOLUTION ORAL; RESPIRATORY (INHALATION) at 11:38

## 2019-04-06 RX ADMIN — BRIVARACETAM 100 MG: 10 SOLUTION ORAL at 10:08

## 2019-04-06 RX ADMIN — CARBAMAZEPINE 150 MG: 100 SUSPENSION ORAL at 13:05

## 2019-04-06 RX ADMIN — POTASSIUM & SODIUM PHOSPHATES POWDER PACK 280-160-250 MG 1 PACKET: 280-160-250 PACK at 15:44

## 2019-04-06 RX ADMIN — Medication 1 PACKET: at 13:05

## 2019-04-06 RX ADMIN — ACETYLCYSTEINE 2 ML: 200 SOLUTION ORAL; RESPIRATORY (INHALATION) at 19:47

## 2019-04-06 RX ADMIN — LEVOTHYROXINE SODIUM 137 MCG: 137 TABLET ORAL at 05:34

## 2019-04-06 RX ADMIN — ALBUTEROL SULFATE 2.5 MG: 2.5 SOLUTION RESPIRATORY (INHALATION) at 15:30

## 2019-04-06 RX ADMIN — BRIVARACETAM 100 MG: 10 SOLUTION ORAL at 23:10

## 2019-04-06 RX ADMIN — Medication 40 MG: at 08:03

## 2019-04-06 RX ADMIN — PIPERACILLIN SODIUM,TAZOBACTAM SODIUM 4.5 G: 4; .5 INJECTION, POWDER, FOR SOLUTION INTRAVENOUS at 23:07

## 2019-04-06 RX ADMIN — CARBAMAZEPINE 150 MG: 100 SUSPENSION ORAL at 08:03

## 2019-04-06 RX ADMIN — POTASSIUM CHLORIDE 40 MEQ: 1.5 POWDER, FOR SOLUTION ORAL at 13:05

## 2019-04-06 RX ADMIN — HYDROCORTISONE 20 MG: 20 TABLET ORAL at 08:01

## 2019-04-06 RX ADMIN — CARBAMAZEPINE 150 MG: 100 SUSPENSION ORAL at 02:56

## 2019-04-06 RX ADMIN — PIPERACILLIN SODIUM,TAZOBACTAM SODIUM 4.5 G: 4; .5 INJECTION, POWDER, FOR SOLUTION INTRAVENOUS at 10:08

## 2019-04-06 RX ADMIN — PIPERACILLIN SODIUM,TAZOBACTAM SODIUM 4.5 G: 4; .5 INJECTION, POWDER, FOR SOLUTION INTRAVENOUS at 05:34

## 2019-04-06 RX ADMIN — ACETYLCYSTEINE 2 ML: 200 SOLUTION ORAL; RESPIRATORY (INHALATION) at 07:43

## 2019-04-06 RX ADMIN — PIPERACILLIN SODIUM,TAZOBACTAM SODIUM 4.5 G: 4; .5 INJECTION, POWDER, FOR SOLUTION INTRAVENOUS at 15:44

## 2019-04-06 RX ADMIN — ENOXAPARIN SODIUM 40 MG: 40 INJECTION SUBCUTANEOUS at 20:59

## 2019-04-06 RX ADMIN — POTASSIUM & SODIUM PHOSPHATES POWDER PACK 280-160-250 MG 1 PACKET: 280-160-250 PACK at 23:10

## 2019-04-06 RX ADMIN — POTASSIUM CHLORIDE 20 MEQ: 1.5 POWDER, FOR SOLUTION ORAL at 15:44

## 2019-04-06 RX ADMIN — GLYCOPYRROLATE 0.2 MG: 1 LIQUID ORAL at 08:04

## 2019-04-06 RX ADMIN — GLYCOPYRROLATE 0.2 MG: 1 LIQUID ORAL at 21:00

## 2019-04-06 RX ADMIN — ALBUTEROL SULFATE 2.5 MG: 2.5 SOLUTION RESPIRATORY (INHALATION) at 11:36

## 2019-04-06 RX ADMIN — ACETYLCYSTEINE 2 ML: 200 SOLUTION ORAL; RESPIRATORY (INHALATION) at 15:30

## 2019-04-06 ASSESSMENT — ACTIVITIES OF DAILY LIVING (ADL)
ADLS_ACUITY_SCORE: 43
ADLS_ACUITY_SCORE: 45
ADLS_ACUITY_SCORE: 45
ADLS_ACUITY_SCORE: 43

## 2019-04-06 ASSESSMENT — MIFFLIN-ST. JEOR: SCORE: 1578.33

## 2019-04-06 NOTE — PROGRESS NOTES
[facility]    Medicine Progress Note - Hospitalist Service       Date of Admission:  4/3/2019  Assessment & Plan   Mr. Keyon Farias is a 58-year-old male with a traumatic brain injury with resultant aphasia, spastic paraplegia, seizure, panhypopituitarism, GERD, and recurrent aspiration pneumonias, who was just in hospital from 03/30 to 04/02 and presents back with increased cough, copious secretions, and fever with concerns for aspiration pneumonia again.  This will be his seventh admission for similar presentation this year in 2019.      1.  Recurrent aspiration pneumonia versus pneumonitis.   He has had frequent recurrence events despite G-tube feedings and aspiration precautions.  Historically, he recovered. Continued question of pneumonitis vs aspiration pneumonia with brief fever and fairly quick improvement.  He has been evaluated by Infectious Disease on several occasions.  We will start him on Zosyn again.  We will have aspiration precautions, n.p.o. including medications, and feeding will be through G-tube, G-tube also used for medications.  Will resume his PTA albuterol nebs, Mucomyst nebs.   - 4/4 on RA, continuing zosyn IV for now  - at mother's request, will try small dose of glycopyrrolate as she says he benefited greatly from it in regards to decreasing secretions  Started him on glycopyrrolate 0.2mg sublingual BID  Today. Discussed with mother about starting this medication   Fluid overload could be part of his prsentation as he gets tube feeds 100ml/hr for 12hours and tube flushes 150ml q 4hours   4 /5 Started lasix 40mg PO daily today to keep him euvolemic   Echo 4/5 - normal EF and does not appear to have diastolic dysfunction  - 4/6 Lasix discontinued - BP borderline soft and does not appear overloaded, plus echo unremarkable   - will plan on de-escalating abx tomorrow pending continued stability/improvement    Pt was seen by CT surgery in the past with  for possible  trach to prevent recurrent aspiration CT surgery recommended against it      2.  Nutrition:  N.p.o., including for medications and G-tube feeding.  We will have Nutrition consult for initiation of tube feeding.  We will have Protonix.    - Stopped IVF 4/5     3.  Traumatic brain injury with sequelae of aphasia and spastic paralysis.  He gets home care services.  We will consult  for disposition planning.      4.  Seizure disorder.  Resume prior to admission, Tegretol and levetiracetam when verified by Pharmacy.      5.  Panhypopituitarism.  Resume PTA hydrocortisone 20 mg in morning and 10 mg nightly, not currently septic.  I do not see a need for stress dose steroids at this time.      6.  Hypothyroidism.  Continue PTA Synthroid.      7.  Hypogonadotropic hypogonadism.  Resume PTA Testosterone injection in the outpatient setting.       Diet: NPO for Medical/Clinical Reasons Except for: No Exceptions  Adult Formula Drip Feeding: Continuous Isosource 1.5; Jejunostomy; Goal Rate: 100; mL/hr; From: 7:00 AM; 7:00 PM; Medication - Feeding Tube Flush Frequency: At least 15-30 mL water before and after medication administration and with tube clogging;...    DVT Prophylaxis: Enoxaparin (Lovenox) SQ  Lerma Catheter: not present  Code Status: Full Code      Disposition Plan   Expected discharge: Likely to home in next 2 days or so. No new needs anticipated at discharge.    Entered: Washington Connors MD 04/06/2019, 9:09 AM       The patient's care was discussed with the Bedside Nurse and Patient.    Washington Connors MD  Hospitalist Service  [facility]    ______________________________________________________________________    Interval History   No new events - patient is somewhat nonverbal often but does communicate with me with head/hand gestures. No new complaints. Seems to say that breathing is fine, and he denies pain. Continuing IV antibiotics for now.    Data reviewed today: I reviewed all  medications, new labs and imaging results over the last 24 hours. I personally reviewed no images or EKG's today.    Physical Exam   Vital Signs: Temp: 98.5  F (36.9  C) Temp src: Oral BP: 93/51 Pulse: 63 Heart Rate: 63 Resp: 16 SpO2: 94 % O2 Device: None (Room air)    Weight: 156 lbs 9.6 oz    Gen: NAD, pleasant  HEENT: Normocephalic, EOMI, MMM  Resp: no crackles or wheezes, no increased work of resp  CV: S1S2 heard, reg rhythm, reg rate, no pedal edema  Abdo: soft, nontender, nondistended, bowel sounds present  Ext: calves nontender, well perfused  Neuro: baseline deficits appear stable; no changes. Tracks visually, moves upper extremities at will.       Data   Recent Labs   Lab 04/05/19  0751 04/04/19  1312 04/03/19  1355 04/02/19  0821  03/30/19 2000   WBC 6.0 7.0 10.2 10.1   < > 14.5*   HGB 11.4* 10.5* 12.1* 10.7*   < > 12.9*   MCV 83 83 84 84   < > 84    180 260 188   < > 233   NA  --  138 135 137   < > 139   POTASSIUM  --  3.9 4.6 3.6   < > 4.1   CHLORIDE  --  107 102 104   < > 101   CO2  --  23 26 27   < > 29   BUN  --  12 14 12   < > 14   CR  --  0.89 0.91 0.99   < > 0.82   ANIONGAP  --  8 7 6   < > 9   STEVE  --  7.9* 8.9 8.3*   < > 8.8   GLC  --  152* 98 160*   < > 142*   ALBUMIN  --   --  3.1*  --   --  3.2*   PROTTOTAL  --   --  8.3  --   --  8.6   BILITOTAL  --   --  0.2  --   --  0.2   ALKPHOS  --   --  87  --   --  107   ALT  --   --  32  --   --  40   AST  --   --  26  --   --  22    < > = values in this interval not displayed.     No results found for this or any previous visit (from the past 24 hour(s)).

## 2019-04-06 NOTE — PLAN OF CARE
ORIENTATION: Alert, nonverbal, HECTOR orientation but follows commands  BEHAVIOR & AGGRESSION TOOL COLOR: Green  ABNL VS/O2: VSS on room air  MOBILITY: Total care, lift, repositioned every 2 hours  DIET: NPO, TF from 7A-7P via J tube  BOWEL/BLADDER: Incontinent of bowel and bladder  DRAIN/DEVICES: G/J tube  SKIN: pale skin. Coccyx red, blanchable, mepilex in place  D/C DAY/GOALS/PLACE: pending  OTHER IMPORTANT INFO: Aspiration precautions, contact precautions

## 2019-04-07 LAB — GLUCOSE BLDC GLUCOMTR-MCNC: 80 MG/DL (ref 70–99)

## 2019-04-07 PROCEDURE — 25000132 ZZH RX MED GY IP 250 OP 250 PS 637: Performed by: HOSPITALIST

## 2019-04-07 PROCEDURE — 25000125 ZZHC RX 250: Performed by: HOSPITALIST

## 2019-04-07 PROCEDURE — 25000128 H RX IP 250 OP 636: Performed by: HOSPITALIST

## 2019-04-07 PROCEDURE — 25000132 ZZH RX MED GY IP 250 OP 250 PS 637: Performed by: INTERNAL MEDICINE

## 2019-04-07 PROCEDURE — A9270 NON-COVERED ITEM OR SERVICE: HCPCS | Performed by: HOSPITALIST

## 2019-04-07 PROCEDURE — 94640 AIRWAY INHALATION TREATMENT: CPT

## 2019-04-07 PROCEDURE — 00000146 ZZHCL STATISTIC GLUCOSE BY METER IP

## 2019-04-07 PROCEDURE — 99207 ZZC CDG-MDM COMPONENT: MEETS LOW - DOWN CODED: CPT | Performed by: HOSPITALIST

## 2019-04-07 PROCEDURE — 94640 AIRWAY INHALATION TREATMENT: CPT | Mod: 76

## 2019-04-07 PROCEDURE — 99232 SBSQ HOSP IP/OBS MODERATE 35: CPT | Performed by: HOSPITALIST

## 2019-04-07 PROCEDURE — 12000000 ZZH R&B MED SURG/OB

## 2019-04-07 PROCEDURE — 40000275 ZZH STATISTIC RCP TIME EA 10 MIN

## 2019-04-07 RX ORDER — AMOXICILLIN AND CLAVULANATE POTASSIUM 400; 57 MG/5ML; MG/5ML
875 POWDER, FOR SUSPENSION ORAL 2 TIMES DAILY
Status: DISCONTINUED | OUTPATIENT
Start: 2019-04-07 | End: 2019-04-08

## 2019-04-07 RX ADMIN — AMOXICILLIN AND CLAVULANATE POTASSIUM 875 MG: 400; 57 POWDER, FOR SUSPENSION ORAL at 21:07

## 2019-04-07 RX ADMIN — GLYCOPYRROLATE 0.2 MG: 1 LIQUID ORAL at 09:17

## 2019-04-07 RX ADMIN — AMOXICILLIN AND CLAVULANATE POTASSIUM 875 MG: 400; 57 POWDER, FOR SUSPENSION ORAL at 10:17

## 2019-04-07 RX ADMIN — PIPERACILLIN SODIUM,TAZOBACTAM SODIUM 4.5 G: 4; .5 INJECTION, POWDER, FOR SOLUTION INTRAVENOUS at 05:19

## 2019-04-07 RX ADMIN — MINERAL SUPPLEMENT IRON 300 MG / 5 ML STRENGTH LIQUID 100 PER BOX UNFLAVORED 300 MG: at 09:16

## 2019-04-07 RX ADMIN — LEVOTHYROXINE SODIUM 137 MCG: 137 TABLET ORAL at 05:20

## 2019-04-07 RX ADMIN — ACETYLCYSTEINE 2 ML: 200 SOLUTION ORAL; RESPIRATORY (INHALATION) at 07:23

## 2019-04-07 RX ADMIN — Medication 1 PACKET: at 09:16

## 2019-04-07 RX ADMIN — ALBUTEROL SULFATE 2.5 MG: 2.5 SOLUTION RESPIRATORY (INHALATION) at 20:16

## 2019-04-07 RX ADMIN — ALBUTEROL SULFATE 2.5 MG: 2.5 SOLUTION RESPIRATORY (INHALATION) at 15:52

## 2019-04-07 RX ADMIN — ALBUTEROL SULFATE 2.5 MG: 2.5 SOLUTION RESPIRATORY (INHALATION) at 07:23

## 2019-04-07 RX ADMIN — ACETYLCYSTEINE 2 ML: 200 SOLUTION ORAL; RESPIRATORY (INHALATION) at 11:16

## 2019-04-07 RX ADMIN — POTASSIUM & SODIUM PHOSPHATES POWDER PACK 280-160-250 MG 1 PACKET: 280-160-250 PACK at 16:13

## 2019-04-07 RX ADMIN — HYDROCORTISONE 10 MG: 10 TABLET ORAL at 16:13

## 2019-04-07 RX ADMIN — GLYCOPYRROLATE 0.2 MG: 1 LIQUID ORAL at 21:08

## 2019-04-07 RX ADMIN — BRIVARACETAM 100 MG: 10 SOLUTION ORAL at 21:11

## 2019-04-07 RX ADMIN — MICONAZOLE NITRATE: 2 POWDER TOPICAL at 05:19

## 2019-04-07 RX ADMIN — POTASSIUM & SODIUM PHOSPHATES POWDER PACK 280-160-250 MG 1 PACKET: 280-160-250 PACK at 09:16

## 2019-04-07 RX ADMIN — POTASSIUM & SODIUM PHOSPHATES POWDER PACK 280-160-250 MG 1 PACKET: 280-160-250 PACK at 21:07

## 2019-04-07 RX ADMIN — ACETYLCYSTEINE 2 ML: 200 SOLUTION ORAL; RESPIRATORY (INHALATION) at 15:52

## 2019-04-07 RX ADMIN — ALBUTEROL SULFATE 2.5 MG: 2.5 SOLUTION RESPIRATORY (INHALATION) at 11:16

## 2019-04-07 RX ADMIN — CARBAMAZEPINE 150 MG: 100 SUSPENSION ORAL at 03:00

## 2019-04-07 RX ADMIN — Medication 40 MG: at 09:16

## 2019-04-07 RX ADMIN — HYDROCORTISONE 20 MG: 20 TABLET ORAL at 09:16

## 2019-04-07 RX ADMIN — ACETYLCYSTEINE 2 ML: 200 SOLUTION ORAL; RESPIRATORY (INHALATION) at 20:16

## 2019-04-07 RX ADMIN — ENOXAPARIN SODIUM 40 MG: 40 INJECTION SUBCUTANEOUS at 21:07

## 2019-04-07 RX ADMIN — CARBAMAZEPINE 150 MG: 100 SUSPENSION ORAL at 09:17

## 2019-04-07 RX ADMIN — BRIVARACETAM 100 MG: 10 SOLUTION ORAL at 09:17

## 2019-04-07 RX ADMIN — CARBAMAZEPINE 150 MG: 100 SUSPENSION ORAL at 13:54

## 2019-04-07 RX ADMIN — CARBAMAZEPINE 150 MG: 100 SUSPENSION ORAL at 21:08

## 2019-04-07 ASSESSMENT — ACTIVITIES OF DAILY LIVING (ADL)
ADLS_ACUITY_SCORE: 43

## 2019-04-07 NOTE — PROGRESS NOTES
Care Coordination:    Met with mother Savannah at bedside.  Dr. Connors had spoken with staff and stated planned  discharge today or tomorrow.  Savannah states she does not have a nurse today and would feel more comfortable with patient coming home tomorrow.  She has been talking with home care agency [facility] and states she can have a nurse out there in the morning.  She would like a ride arranged with [facility] at 1100.  A PCP appt was set up for 4/9.  Savannah states she will call tomorrow and move that later in the week.  She voices no other needs for discharge.  [facility] [employer] ride arranged for 1100 4/8/19.  Savannah aware. She does not plan to come to hospital and would like Keyon dressed in hospital gown/scrubs.  Bedside RN updated.    Dr. Connors updated of time and that orders to resume home care will need to be entered.  Email sent to [facility] to update of pending discharge.         Alee Sousa RN BSN  Care Coordinator

## 2019-04-07 NOTE — PLAN OF CARE
DATE & TIME:4/7/19, 7- 0650  ORIENTATION:Alert to self, nonverbal  BEHAVIOR & AGGRESSION TOOL COLOR: calm , co operative, green  CIWA SCORE:NA  ABNL VS/O2:vss, on RA  MOBILITY: up with lift  PAIN MANAGMENT:denies pain  DIET;NPO, TF 7 am to 7pm  ABNL LAB/BG:  DRAIN/DEVICES: GJ tube and PIV  TELEMETRY RHYTHM:NA  SKIN:Blanchable Redness to coccyx, Meplix c/d/i, rash to groin, applied antifungal powder  TESTS/PROCEDURES:  D/C DAY/GOALS/PLACE:possible discharge later today or tomorrow  OTHER IMPORTANT INFO: up with lift, incontinent of bowel and bladder, turn and repo Q 2hrs

## 2019-04-07 NOTE — PROGRESS NOTES
[facility]    Medicine Progress Note - Hospitalist Service       Date of Admission:  4/3/2019  Assessment & Plan    Mr. Keyon Farias is a 58-year-old male with a traumatic brain injury with resultant aphasia, spastic paraplegia, seizure, panhypopituitarism, GERD, and recurrent aspiration pneumonias, who was just in hospital from 03/30 to 04/02 and presents back with increased cough, copious secretions, and fever with concerns for aspiration pneumonia again.  This will be his seventh admission for similar presentation this year in 2019.      1.  Recurrent aspiration pneumonia versus pneumonitis.   He has had frequent recurrence events despite G-tube feedings and aspiration precautions.  Historically, he recovered. Continued question of pneumonitis vs aspiration pneumonia with brief fever and fairly quick improvement.  He has been evaluated by Infectious Disease on several occasions.  We will start him on Zosyn again.  We will have aspiration precautions, n.p.o. including medications, and feeding will be through G-tube, G-tube also used for medications.  Will resume his PTA albuterol nebs, Mucomyst nebs.   - 4/4 on RA, continuing zosyn IV for now  - at mother's request, will try small dose of glycopyrrolate as she says he benefited greatly from it in regards to decreasing secretions  Started him on glycopyrrolate 0.2mg sublingual BID  Today. Discussed with mother about starting this medication   Fluid overload could be part of his prsentation as he gets tube feeds 100ml/hr for 12hours and tube flushes 150ml q 4hours   4 /5 Started lasix 40mg PO daily today to keep him euvolemic   Echo 4/5 - normal EF and does not appear to have diastolic dysfunction  - 4/6 Lasix discontinued - BP borderline soft and does not appear overloaded, plus echo unremarkable   - will plan on de-escalating abx tomorrow pending continued stability/improvement  4/7 continues to be afebrile, on RA - no complaints. Switch to  augmentin today. Discuss with mother regarding discharge - SW following, appreciated.    Pt was seen by CT surgery in the past with  for possible trach to prevent recurrent aspiration CT surgery recommended against it      2.  Nutrition:  N.p.o., including for medications and G-tube feeding.  We will have Nutrition consult for initiation of tube feeding.  We will have Protonix.    - Stopped IVF  4/5     3.  Traumatic brain injury with sequelae of aphasia and spastic paralysis.  He gets home care services.  We will consult  for disposition planning.      4.  Seizure disorder.  Resume prior to admission, Tegretol and levetiracetam when verified by Pharmacy.      5.  Panhypopituitarism.  Resume PTA hydrocortisone 20 mg in morning and 10 mg nightly, not currently septic.  I do not see a need for stress dose steroids at this time.      6.  Hypothyroidism.  Continue PTA Synthroid.      7.  Hypogonadotropic hypogonadism.  Resume PTA Testosterone injection in the outpatient setting.     Diet: NPO for Medical/Clinical Reasons Except for: No Exceptions  Adult Formula Drip Feeding: Continuous Isosource 1.5; Jejunostomy; Goal Rate: 100; mL/hr; From: 7:00 AM; 7:00 PM; Medication - Feeding Tube Flush Frequency: At least 15-30 mL water before and after medication administration and with tube clogging;...    DVT Prophylaxis: Enoxaparin (Lovenox) SQ  Lerma Catheter: not present  Code Status: Full Code      Disposition Plan   Expected discharge: later today or tomorrow. Will continue home care plans - SW/CT following.     Entered: Washington Connors MD 04/07/2019, 9:37 AM       The patient's care was discussed with the Bedside Nurse, Care Coordinator/ and Patient.    Washington Connors MD  Hospitalist Service  [facility]    ______________________________________________________________________    Interval History   No new events or complaints. On RA, afebrile. Has received 4 days of  zosyn, switching to augmentin. Keyon is happy to see me and says he is feeling better. He was excited with the potential for going home.    Data reviewed today: I reviewed all medications, new labs and imaging results over the last 24 hours. I personally reviewed no images or EKG's today.    Physical Exam   Vital Signs: Temp: 97.6  F (36.4  C) Temp src: Axillary BP: 109/68   Heart Rate: 103 Resp: 18 SpO2: 94 % O2 Device: None (Room air)    Weight: 156 lbs 9.6 oz    Gen: NAD, pleasant  HEENT: Normocephalic, EOMI, MMM  Resp: no crackles or wheezes, no increased work of resp  CV: S1S2 heard, reg rhythm, reg rate, no pedal edema  Abdo: soft, nontender, nondistended, bowel sounds present  Ext: calves nontender, well perfused  Neuro: AA, nearly nonverbal but does gesture (hands/head) and can say some words      Data   Recent Labs   Lab 04/06/19 2021 04/06/19  0903 04/05/19  0751 04/04/19  1312 04/03/19  1355   WBC  --   --  6.0 7.0 10.2   HGB  --   --  11.4* 10.5* 12.1*   MCV  --   --  83 83 84   PLT  --  251 204 180 260   NA  --  141  --  138 135   POTASSIUM 4.4 3.2*  --  3.9 4.6   CHLORIDE  --  105  --  107 102   CO2  --  29  --  23 26   BUN  --  14  --  12 14   CR  --  1.06  1.01  --  0.89 0.91   ANIONGAP  --  7  --  8 7   STEVE  --  8.8  --  7.9* 8.9   GLC  --  164*  --  152* 98   ALBUMIN  --   --   --   --  3.1*   PROTTOTAL  --   --   --   --  8.3   BILITOTAL  --   --   --   --  0.2   ALKPHOS  --   --   --   --  87   ALT  --   --   --   --  32   AST  --   --   --   --  26     No results found for this or any previous visit (from the past 24 hour(s)).

## 2019-04-07 NOTE — PLAN OF CARE
7P-7A:  ORIENTATION: Alert, HECTOR orientation due to being nonverbal but does follow commands  BEHAVIOR & AGGRESSION TOOL COLOR: Green  ABNL VS/O2: VSS on room air  MOBILITY: Total care, lift  DIET: STRICT NPO, TF started at 7A runs until 7P  BOWEL/BLADDER: Incontinent  ABNL LAB/BG: K 4.4 after replacement  DRAIN/DEVICES: G/J tube CDI  SKIN: Coccyx wound blanchable - mepilex CDI, Groin rash - antifungal applied, skin red/irriated looking under beard  D/C DAY/GOALS/PLACE: pending  OTHER IMPORTANT INFO: Turned every 2 hours

## 2019-04-08 VITALS
RESPIRATION RATE: 18 BRPM | HEART RATE: 78 BPM | HEIGHT: 72 IN | OXYGEN SATURATION: 96 % | WEIGHT: 158.4 LBS | BODY MASS INDEX: 21.45 KG/M2 | DIASTOLIC BLOOD PRESSURE: 58 MMHG | SYSTOLIC BLOOD PRESSURE: 98 MMHG | TEMPERATURE: 97.6 F

## 2019-04-08 LAB
ANION GAP SERPL CALCULATED.3IONS-SCNC: 10 MMOL/L (ref 3–14)
BUN SERPL-MCNC: 13 MG/DL (ref 7–30)
CALCIUM SERPL-MCNC: 9.2 MG/DL (ref 8.5–10.1)
CHLORIDE SERPL-SCNC: 103 MMOL/L (ref 94–109)
CO2 SERPL-SCNC: 26 MMOL/L (ref 20–32)
CREAT SERPL-MCNC: 0.74 MG/DL (ref 0.66–1.25)
GFR SERPL CREATININE-BSD FRML MDRD: >90 ML/MIN/{1.73_M2}
GLUCOSE SERPL-MCNC: 160 MG/DL (ref 70–99)
MAGNESIUM SERPL-MCNC: 2.3 MG/DL (ref 1.6–2.3)
PHOSPHATE SERPL-MCNC: 2.4 MG/DL (ref 2.5–4.5)
POTASSIUM SERPL-SCNC: 3.5 MMOL/L (ref 3.4–5.3)
SODIUM SERPL-SCNC: 139 MMOL/L (ref 133–144)

## 2019-04-08 PROCEDURE — 80048 BASIC METABOLIC PNL TOTAL CA: CPT | Performed by: HOSPITALIST

## 2019-04-08 PROCEDURE — 84100 ASSAY OF PHOSPHORUS: CPT | Performed by: HOSPITALIST

## 2019-04-08 PROCEDURE — 94640 AIRWAY INHALATION TREATMENT: CPT

## 2019-04-08 PROCEDURE — 25000125 ZZHC RX 250: Performed by: HOSPITALIST

## 2019-04-08 PROCEDURE — A9270 NON-COVERED ITEM OR SERVICE: HCPCS | Performed by: HOSPITALIST

## 2019-04-08 PROCEDURE — 99239 HOSP IP/OBS DSCHRG MGMT >30: CPT | Performed by: HOSPITALIST

## 2019-04-08 PROCEDURE — 25000132 ZZH RX MED GY IP 250 OP 250 PS 637: Performed by: HOSPITALIST

## 2019-04-08 PROCEDURE — 83735 ASSAY OF MAGNESIUM: CPT | Performed by: HOSPITALIST

## 2019-04-08 PROCEDURE — 36415 COLL VENOUS BLD VENIPUNCTURE: CPT | Performed by: HOSPITALIST

## 2019-04-08 PROCEDURE — 25000132 ZZH RX MED GY IP 250 OP 250 PS 637: Performed by: INTERNAL MEDICINE

## 2019-04-08 PROCEDURE — 27210429 ZZH NUTRITION PRODUCT INTERMEDIATE LITER

## 2019-04-08 RX ORDER — AMOXICILLIN AND CLAVULANATE POTASSIUM 400; 57 MG/5ML; MG/5ML
875 POWDER, FOR SUSPENSION ORAL 2 TIMES DAILY
Status: DISCONTINUED | OUTPATIENT
Start: 2019-04-08 | End: 2019-04-08 | Stop reason: HOSPADM

## 2019-04-08 RX ADMIN — POTASSIUM & SODIUM PHOSPHATES POWDER PACK 280-160-250 MG 1 PACKET: 280-160-250 PACK at 08:35

## 2019-04-08 RX ADMIN — ACETYLCYSTEINE 4 ML: 200 SOLUTION ORAL; RESPIRATORY (INHALATION) at 07:18

## 2019-04-08 RX ADMIN — CARBAMAZEPINE 150 MG: 100 SUSPENSION ORAL at 08:35

## 2019-04-08 RX ADMIN — GLYCOPYRROLATE 0.2 MG: 1 LIQUID ORAL at 08:35

## 2019-04-08 RX ADMIN — LEVOTHYROXINE SODIUM 137 MCG: 137 TABLET ORAL at 04:57

## 2019-04-08 RX ADMIN — ALBUTEROL SULFATE 2.5 MG: 2.5 SOLUTION RESPIRATORY (INHALATION) at 07:17

## 2019-04-08 RX ADMIN — MINERAL SUPPLEMENT IRON 300 MG / 5 ML STRENGTH LIQUID 100 PER BOX UNFLAVORED 300 MG: at 08:34

## 2019-04-08 RX ADMIN — CARBAMAZEPINE 150 MG: 100 SUSPENSION ORAL at 02:59

## 2019-04-08 RX ADMIN — HYDROCORTISONE 20 MG: 20 TABLET ORAL at 08:34

## 2019-04-08 RX ADMIN — BRIVARACETAM 100 MG: 10 SOLUTION ORAL at 08:35

## 2019-04-08 RX ADMIN — Medication 40 MG: at 08:35

## 2019-04-08 RX ADMIN — Medication 1 PACKET: at 08:35

## 2019-04-08 RX ADMIN — AMOXICILLIN AND CLAVULANATE POTASSIUM 875 MG: 400; 57 POWDER, FOR SUSPENSION ORAL at 08:35

## 2019-04-08 ASSESSMENT — MIFFLIN-ST. JEOR: SCORE: 1586.5

## 2019-04-08 ASSESSMENT — ACTIVITIES OF DAILY LIVING (ADL)
ADLS_ACUITY_SCORE: 45
ADLS_ACUITY_SCORE: 43
ADLS_ACUITY_SCORE: 43

## 2019-04-08 NOTE — PLAN OF CARE
DATE & TIME:4/7/19,   ORIENTATION:Alert to self, nonverbal, anxious at times  BEHAVIOR & AGGRESSION TOOL COLOR: calm/cooperative, green  CIWA SCORE:NA  ABNL VS/O2:VSS on RA  MOBILITY: up with lift  PAIN MANAGMENT: no s/s pain  DIET; NPO, TF 7 am to 7pm, Flushes  ABNL LAB/BG:K-3.2/4.4  DRAIN/DEVICES: GJ tube and PIV  TELEMETRY RHYTHM:NA  SKIN:Blanchable Redness to coccyx, Meplix c/d/i, rash to groin, applied antifungal powder  TESTS/PROCEDURES:none  D/C DAY/GOALS/PLACE: discharge to home tomorrow at 11:00  OTHER IMPORTANT INFO: up with lift, incontinent of bowel and bladder, total-turn and repo Q 2hrs

## 2019-04-08 NOTE — PLAN OF CARE
Nonverbal, HECTOR orientation. VSS on RA. No nonverbal indicators of pain, pt smiling, calm cooperative. GJ tube, dressing CDI; TF infusing at 100ml/hr, with 60ml flush q4; disconnected upon discharge. LS diminished, some accessory muscle use with infrequent cough. Blanchable redness to bottom, covered with foam, CDI. Guardian up to date, questions answered.     Discharge    Patient discharged to home with homecare, transportation provided by [facility] stretcher      Listed belongings gathered and returned to patient. NA, no belongings  Care Plan and Patient education resolved: Yes  Prescriptions if needed, hard copies sent with patient  NA  Home and hospital acquired medications returned to patient: NA  Medication Bin checked and emptied on discharge Yes  Follow up appointment made for patient: Yes

## 2019-04-08 NOTE — PLAN OF CARE
[04/08/19 0604 Anthony Nunez, RN - Registered Nurse]   DATE & TIME:4/8/19, 9927-3360  ORIENTATION:Alert to self, nonverbal, anxious at times  BEHAVIOR & AGGRESSION TOOL COLOR: calm/cooperative, green  CIWA SCORE:NA  ABNL VS/O2:VSS on RA  MOBILITY: in bed turned and repositioned with 2 assist  PAIN MANAGMENT: no s/s pain  DIET; NPO, TF 7 am to 7pm, Flushes  ABNL LAB/BG:K-pending  DRAIN/DEVICES: GJ tube and PIV  TELEMETRY RHYTHM:NA  SKIN:Blanchable Redness to coccyx, Meplix c/d/i, rash to groin, applied antifungal powder  TESTS/PROCEDURES:none  D/C DAY/GOALS/PLACE: discharge to home today at 11:00  OTHER IMPORTANT INFO: up with lift, incontinent of bowel and bladder, total-turn and repo Q 2hrs

## 2019-04-08 NOTE — DISCHARGE SUMMARY
[facility]  Hospitalist Discharge Summary       Date of Admission:  4/3/2019  Date of Discharge:  4/8/2019  Discharging Provider: Washington Connors MD      Discharge Diagnoses   1. Recurrent aspiration pneumonia vs pneumonitis  2. Frequent readmissions for the above    Multiple chronic medical conditions as described below    Follow-ups Needed After Discharge   Follow-up Appointments     Follow-up and recommended labs and tests       PCP on 4/9 as scheduled - review multiple hospitalizations and overall   care plan             Unresulted Labs Ordered in the Past 30 Days of this Admission     Date and Time Order Name Status Description    4/8/2019 0001 Phosphorus In process     4/8/2019 0001 Magnesium In process     4/8/2019 0001 Basic metabolic panel In process     4/3/2019 1439 Blood culture Preliminary     4/3/2019 1439 Blood culture Preliminary       These results will be followed up by PCP    Discharge Disposition   Discharged to home  Condition at discharge: Stable    Hospital Course       Mr. Keyon Farias is a 58-year-old male with a traumatic brain injury with resultant aphasia, spastic paraplegia, seizure, panhypopituitarism, GERD, and recurrent aspiration pneumonias, who was just in hospital from 03/30 to 04/02 and presents back with increased cough, copious secretions, and fever with concerns for aspiration pneumonia again.  This will be his seventh admission for similar presentation this year in 2019.      1.  Recurrent aspiration pneumonia versus pneumonitis.   He has had frequent recurrence events despite G-tube feedings and aspiration precautions.  Historically, he recovered. Continued question of pneumonitis vs aspiration pneumonia with brief fever and fairly quick improvement.  He has been evaluated by Infectious Disease on several occasions.  We will start him on Zosyn again.  We will have aspiration precautions, n.p.o. including medications, and feeding will be through G-tube, G-tube  also used for medications.  Will resume his PTA albuterol nebs, Mucomyst nebs.   - 4/4 on RA, continuing zosyn IV for now  - at mother's request, will try small dose of glycopyrrolate as she says he benefited greatly from it in regards to decreasing secretions  Started him on glycopyrrolate 0.2mg sublingual BID  Today. Discussed with mother about starting this medication   Fluid overload could be part of his prsentation as he gets tube feeds 100ml/hr for 12hours and tube flushes 150ml q 4hours   4 /5 Started lasix 40mg PO daily today to keep him euvolemic   Echo 4/5 - normal EF and does not appear to have diastolic dysfunction  - 4/6 Lasix discontinued - BP borderline soft and does not appear overloaded, plus echo unremarkable   - will plan on de-escalating abx tomorrow pending continued stability/improvement  4/7 continues to be afebrile, on RA - no complaints. Switch to augmentin today. Discuss with mother regarding discharge - SW following, appreciated.  - 4/8 patient remained afebrile and was clearing any secretions without significant problems. I spoke with his mother prior to discharge who was in agreement with discharge back home.  He will discharge with several days of augmentin continued as he has had repeated admissions for the same issue. I reiterated to her multiple times during the past two admissions, that they are doing essentially everything possible to avoid aspiration events but that the reality is that Keyon is at extremely high risk of recurrence. Glycopyrrolate has been continued at discharge.  Additionally, I had a lengthy conversation with Keyon's mother, regarding even the slightest thought of a Palliative consultation given the overall picture and multitude of conditions going against Keyon. She was vehemently against even the mentioning of it; despite my cautious explanation of the wide range of excellent cares and guidance offered by Palliative Medicine.  I gently explained that planning for  "the future is more than appropriate and that quality of life and realistic goals should be an ongoing focus for Keyon's best interest. I did not suggest that comfort cares should be pursued and I exhaustively explained that a Palliative Consult was in no way a change or withdrawal of cares. To summarize, she stated that she \"will never stop trying no matter what.\"  Further cautious discussion is strongly recommended with PCP at the time of follow up this week.    *of note:  Pt was seen by CT surgery in the past with  for possible trach to prevent recurrent aspiration CT surgery recommended against it      2.  Nutrition:  N.p.o., including for medications and G-tube feeding.  We will have Nutrition consult for initiation of tube feeding.  We will have Protonix.    - Stopped IVF  4/5, there was concern of mild fluid overload contributing to possible aspiration, however work up above did not support this.     3.  Traumatic brain injury with sequelae of aphasia and spastic paralysis.    He gets home care services.  Social work appreciated in coordinating continued services.     4.  Seizure disorder.  Resume prior to admission, Tegretol and levetiracetam.      5.  Panhypopituitarism.  Resume PTA hydrocortisone 20 mg in morning and 10 mg nightly, not currently septic. He was not felt to be in need of stress dosing.     6.  Hypothyroidism.  Continue PTA Synthroid.      7.  Hypogonadotropic hypogonadism.  Resume PTA Testosterone injection in the outpatient setting.       Consultations This Hospital Stay   SOCIAL WORK IP CONSULT  NUTRITION SERVICES ADULT IP CONSULT  PHARMACY IP CONSULT  CARE TRANSITION RN/SW IP CONSULT    Code Status   Full Code    Time Spent on this Encounter   I, Washington Connors, personally saw the patient today and spent greater than 30 minutes discharging this patient.       Washington Connors MD  [facility]  ______________________________________________________________________    Physical Exam   Vital Signs: Temp: 98.3  F (36.8  C) Temp src: Axillary BP: 110/68 Pulse: 78 Heart Rate: 78 Resp: 18 SpO2: 94 % O2 Device: None (Room air)    Weight: 158 lbs 6.4 oz    Gen: NAD, pleasant and responsive in largely nonverbal means  HEENT: Normocephalic, EOMI, MMM  Resp: no crackles or wheezes, no increased work of resp  CV: S1S2 heard, reg rhythm, reg rate, no pedal edema  Abdo: soft, nontender, nondistended, bowel sounds present; J tube site c/d/i  Ext: calves nontender, well perfused  Neuro: AA, mainly nonverbal but does have some words and simple responses amongst head and hand gestures, moves extremities at will, appears to be at baseline with deficits from prior history of TBI many years ago         Primary Care Physician   Carlos Gomez    Immunizations       Discharge Orders      Home care nursing referral      Home Care PT Referral for Hospital Discharge      Home Care OT Referral for Hospital Discharge      Reason for your hospital stay    Fever recurrence and concern of another episode of aspiration pneumonitis vs pneumonia     Follow-up and recommended labs and tests     PCP on 4/9 as scheduled - review multiple hospitalizations and overall care plan     Activity    Your activity upon discharge: activity as tolerated; resume prior activities and cares     Full Code     Diet    Follow this diet upon discharge: Orders Placed This Encounter      Adult Formula Drip Feeding: Continuous Isosource 1.5; Jejunostomy; Goal Rate: 100; mL/hr; From: 7:00 AM; 7:00 PM; Medication - Feeding Tube Flush Frequency: At least 15-30 mL water before and after medication administration and with tube clogging;...      NPO for Medical/Clinical Reasons Except for: No Exceptions       Significant Results and Procedures   Most Recent 3 CBC's:  Recent Labs   Lab Test 04/06/19  0903 04/05/19  0751 04/04/19  1312 04/03/19  1355   WBC  --   6.0 7.0 10.2   HGB  --  11.4* 10.5* 12.1*   MCV  --  83 83 84    204 180 260     Most Recent 3 BMP's:  Recent Labs   Lab Test 04/08/19  0846 04/06/19  2021 04/06/19  0903 04/04/19  1312     --  141 138   POTASSIUM 3.5 4.4 3.2* 3.9   CHLORIDE 103  --  105 107   CO2 26  --  29 23   BUN 13  --  14 12   CR 0.74  --  1.06  1.01 0.89   ANIONGAP 10  --  7 8   STEVE 9.2  --  8.8 7.9*   *  --  164* 152*     Most Recent 2 LFT's:  Recent Labs   Lab Test 04/03/19  1355 03/30/19 2000   AST 26 22   ALT 32 40   ALKPHOS 87 107   BILITOTAL 0.2 0.2     Most Recent 3 Troponin's:  Recent Labs   Lab Test 01/22/17  0500 01/21/17  2348 01/21/17  1600   TROPI 0.017 0.025 0.028     Most Recent 3 BNP's:  Recent Labs   Lab Test 04/05/19  1109   NTBNPI 400     Most Recent 6 Bacteria Isolates From Any Culture (See EPIC Reports for Culture Details):  Recent Labs   Lab Test 04/03/19  1549 04/03/19  1449 04/03/19  1355 03/30/19  2130 03/30/19  2110 03/30/19 2000   CULT <1000 colonies/mL  urogenital jaime  Susceptibility testing not routinely done   No growth after 5 days No growth after 5 days No growth No growth No growth     Most Recent Urinalysis:  Recent Labs   Lab Test 04/03/19  1549  10/03/17  2224   COLOR Yellow   < > Yellow   APPEARANCE Clear   < > Clear   URINEGLC Negative   < > Negative   URINEBILI Negative   < > Negative   URINEKETONE Negative   < > Negative   SG 1.018   < > 1.015   UBLD Negative   < > Negative   URINEPH 8.0*   < > 7.5*   PROTEIN 30*   < > 30*   UROBILINOGEN  --   --  0.2   NITRITE Negative   < > Negative   LEUKEST Negative   < > Negative   RBCU 1   < > O - 2   WBCU 2   < > O - 2    < > = values in this interval not displayed.   ,   Results for orders placed or performed during the hospital encounter of 04/03/19   Chest XR,  PA & LAT    Narrative    XR CHEST 2 VW 4/3/2019 3:16 PM     HISTORY: cough with fever    COMPARISON: 3/30/2019      Impression    IMPRESSION: Shallow degree of inspiration.  There are increased  interstitial opacities in the mid and lower lungs bilaterally. This  could be due to atelectasis but worsening infection cannot be  excluded. The cardiac silhouette is stable.    MYCHAL SPEARS MD       Discharge Medications   Current Discharge Medication List      CONTINUE these medications which have NOT CHANGED    Details   acetaminophen (TYLENOL) 500 MG tablet 1,000 mg by Oral or FT or NG tube route every 6 hours as needed for mild pain      acetylcysteine (MUCOMYST) 20 % neb solution Take 2 mLs by nebulization 4 times daily  Qty: 300 vial, Refills: 0    Associated Diagnoses: Pneumonia of both lower lobes due to infectious organism (H)      albuterol (PROVENTIL) (5 MG/ML) 0.5% neb solution Take 0.5 mLs (2.5 mg) by nebulization every 4 hours (while awake)  Qty: 360 mL, Refills: 0    Associated Diagnoses: Pneumonia of both lower lobes due to infectious organism (H)      amoxicillin-clavulanate (AUGMENTIN) 875-125 MG tablet Take 1 tablet by mouth every 12 hours  Qty: 14 tablet, Refills: 0    Associated Diagnoses: Pneumonia of both lower lobes due to infectious organism (H)      Brivaracetam (BRIVIACT) 10 MG/ML solution 100 mg by Oral or FT or NG tube route 2 times daily @0900 and 2100      calcium carbonate 1250 (500 CA) MG/5ML SUSP suspension 5 mLs (1,250 mg) by Per J Tube route 3 times daily (with meals)  Qty: 450 mL    Associated Diagnoses: Malnutrition (H)      carBAMazepine (TEGRETOL) 100 MG/5ML suspension Take 150 mg by mouth every 6 hours At 06:00, 12:00, 18:00 and 24:00 for seizures      ferrous sulfate 220 (44 Fe) MG/5ML ELIX 220 mg by Per Feeding Tube route daily      glycopyrrolate (GLYCATE) 2 MG tablet Take 1-2 mg by mouth 2 times daily as needed (secretions) 1/2 to 1 tablet (1 - 2 mg) up to twice daily if needed for secretions.      Guar Gum (FIBER MODULAR, NUTRISOURCE FIBER,) packet 1 packet by Per G Tube route daily  Qty: 30 packet, Refills: 0    Associated Diagnoses: Pneumonia  of both lower lobes due to infectious organism (H)      !! hydrocortisone (CORTEF) 5 MG tablet 10 mg by Oral or FT or NG tube route daily (with dinner) At 1500      !! hydrocortisone (CORTEF) 5 MG tablet 20 mg by Oral or FT or NG tube route every morning       levothyroxine (SYNTHROID/LEVOTHROID) 137 MCG tablet 137 mcg by Oral or FT or NG tube route daily @ 05:00      melatonin (MELATONIN) 1 MG/ML LIQD liquid 6 mg by Per NG tube route At Bedtime       Multiple Vitamins-Minerals (CENTRUM SILVER) per tablet Take 1 tablet by mouth daily Crush and feed via j-tube      pantoprazole (PROTONIX) 2 mg/mL SUSP suspension 20 mLs (40 mg) by Per J Tube route daily  Qty: 400 mL, Refills: 1    Associated Diagnoses: Gastroesophageal reflux disease, esophagitis presence not specified      potassium & sodium phosphates (NEUTRA-PHOS) 280-160-250 MG Packet Take 1 packet by mouth 3 times daily 0900, 1500, 2100.       Vitamin D, Cholecalciferol, 1000 units TABS Take 2,000 Units by mouth every morning Crush and feed via j-tube      bacitracin ointment Apply topically daily as needed for wound care To PEG site.       order for DME Equipment being ordered: Nebulizer  Qty: 1 Units, Refills: 0    Associated Diagnoses: Pneumonia of both lower lobes due to infectious organism (H)      testosterone cypionate (DEPOTESTOTERONE CYPIONATE) 200 MG/ML injection Inject 76 mg into the muscle See Admin Instructions Every 2 weeks on Fridays   76 mg or 0.38 mL       !! - Potential duplicate medications found. Please discuss with provider.        Allergies   Allergies   Allergen Reactions     Dilantin [Phenytoin Sodium]      Valproic Acid      Toxicity c bone marrow suspension, elevated ammonia levels

## 2019-04-22 ENCOUNTER — HOSPITAL ENCOUNTER (EMERGENCY)
Facility: CLINIC | Age: 59
Discharge: HOME | End: 2019-04-23
Attending: EMERGENCY MEDICINE | Admitting: EMERGENCY MEDICINE
Payer: MEDICARE

## 2019-04-22 DIAGNOSIS — R05.9 COUGH: ICD-10-CM

## 2019-04-22 DIAGNOSIS — M62.81 GENERALIZED MUSCLE WEAKNESS: ICD-10-CM

## 2019-04-22 DIAGNOSIS — R00.0 SINUS TACHYCARDIA: ICD-10-CM

## 2019-04-22 LAB
CO2 BLDCOV-SCNC: 27 MMOL/L (ref 21–28)
LACTATE BLD-SCNC: 1.6 MMOL/L (ref 0.7–2.1)
PCO2 BLDV: 40 MM HG (ref 40–50)
PH BLDV: 7.43 PH (ref 7.32–7.43)
PO2 BLDV: 46 MM HG (ref 25–47)
SAO2 % BLDV FROM PO2: 83 %

## 2019-04-22 PROCEDURE — 85025 COMPLETE CBC W/AUTO DIFF WBC: CPT | Performed by: EMERGENCY MEDICINE

## 2019-04-22 PROCEDURE — 80053 COMPREHEN METABOLIC PANEL: CPT | Performed by: EMERGENCY MEDICINE

## 2019-04-22 PROCEDURE — 96360 HYDRATION IV INFUSION INIT: CPT

## 2019-04-22 PROCEDURE — 93005 ELECTROCARDIOGRAM TRACING: CPT

## 2019-04-22 PROCEDURE — 25000128 H RX IP 250 OP 636: Performed by: EMERGENCY MEDICINE

## 2019-04-22 PROCEDURE — 83605 ASSAY OF LACTIC ACID: CPT

## 2019-04-22 PROCEDURE — 99285 EMERGENCY DEPT VISIT HI MDM: CPT | Mod: 25

## 2019-04-22 PROCEDURE — 96361 HYDRATE IV INFUSION ADD-ON: CPT

## 2019-04-22 PROCEDURE — 82803 BLOOD GASES ANY COMBINATION: CPT

## 2019-04-22 RX ADMIN — SODIUM CHLORIDE 1000 ML: 9 INJECTION, SOLUTION INTRAVENOUS at 23:54

## 2019-04-22 NOTE — ED AVS SNAPSHOT
Emergency Department  [address] [place] [id number]  Phone:  [phone]  Fax:  [phone]                                    Keyon Farias   MRN: [id number]    Department:   Emergency Department   Date of Visit:  4/22/2019           After Visit Summary Signature Page    I have received my discharge instructions, and my questions have been answered. I have discussed any challenges I see with this plan with the nurse or doctor.    ..........................................................................................................................................  Patient/Patient Representative Signature      ..........................................................................................................................................  Patient Representative Print Name and Relationship to Patient    ..................................................               ................................................  Date                                   Time    ..........................................................................................................................................  Reviewed by Signature/Title    ...................................................              ..............................................  Date                                               Time          22EPIC Rev 08/18

## 2019-04-23 ENCOUNTER — APPOINTMENT (OUTPATIENT)
Dept: GENERAL RADIOLOGY | Facility: CLINIC | Age: 59
End: 2019-04-23
Attending: EMERGENCY MEDICINE
Payer: MEDICARE

## 2019-04-23 VITALS
RESPIRATION RATE: 11 BRPM | HEART RATE: 84 BPM | TEMPERATURE: 99 F | DIASTOLIC BLOOD PRESSURE: 62 MMHG | SYSTOLIC BLOOD PRESSURE: 112 MMHG | OXYGEN SATURATION: 95 %

## 2019-04-23 LAB
ALBUMIN SERPL-MCNC: 2.9 G/DL (ref 3.4–5)
ALBUMIN UR-MCNC: 10 MG/DL
ALP SERPL-CCNC: 98 U/L (ref 40–150)
ALT SERPL W P-5'-P-CCNC: 27 U/L (ref 0–70)
ANION GAP SERPL CALCULATED.3IONS-SCNC: 7 MMOL/L (ref 3–14)
APPEARANCE UR: CLEAR
AST SERPL W P-5'-P-CCNC: 26 U/L (ref 0–45)
BASOPHILS # BLD AUTO: 0.1 10E9/L (ref 0–0.2)
BASOPHILS NFR BLD AUTO: 0.6 %
BILIRUB SERPL-MCNC: 0.1 MG/DL (ref 0.2–1.3)
BILIRUB UR QL STRIP: NEGATIVE
BUN SERPL-MCNC: 13 MG/DL (ref 7–30)
CALCIUM SERPL-MCNC: 8.5 MG/DL (ref 8.5–10.1)
CHLORIDE SERPL-SCNC: 105 MMOL/L (ref 94–109)
CO2 SERPL-SCNC: 27 MMOL/L (ref 20–32)
COLOR UR AUTO: YELLOW
CREAT SERPL-MCNC: 0.8 MG/DL (ref 0.66–1.25)
DIFFERENTIAL METHOD BLD: ABNORMAL
EOSINOPHIL # BLD AUTO: 0.5 10E9/L (ref 0–0.7)
EOSINOPHIL NFR BLD AUTO: 4.8 %
ERYTHROCYTE [DISTWIDTH] IN BLOOD BY AUTOMATED COUNT: 15.5 % (ref 10–15)
FLUAV+FLUBV AG SPEC QL: NEGATIVE
FLUAV+FLUBV AG SPEC QL: NEGATIVE
GFR SERPL CREATININE-BSD FRML MDRD: >90 ML/MIN/{1.73_M2}
GLUCOSE SERPL-MCNC: 125 MG/DL (ref 70–99)
GLUCOSE UR STRIP-MCNC: NEGATIVE MG/DL
HCT VFR BLD AUTO: 38.2 % (ref 40–53)
HGB BLD-MCNC: 12.6 G/DL (ref 13.3–17.7)
HGB UR QL STRIP: NEGATIVE
HYALINE CASTS #/AREA URNS LPF: 2 /LPF (ref 0–2)
IMM GRANULOCYTES # BLD: 0 10E9/L (ref 0–0.4)
IMM GRANULOCYTES NFR BLD: 0.1 %
INTERPRETATION ECG - MUSE: NORMAL
KETONES UR STRIP-MCNC: NEGATIVE MG/DL
LEUKOCYTE ESTERASE UR QL STRIP: NEGATIVE
LYMPHOCYTES # BLD AUTO: 2.2 10E9/L (ref 0.8–5.3)
LYMPHOCYTES NFR BLD AUTO: 19.7 %
MCH RBC QN AUTO: 27.5 PG (ref 26.5–33)
MCHC RBC AUTO-ENTMCNC: 33 G/DL (ref 31.5–36.5)
MCV RBC AUTO: 83 FL (ref 78–100)
MONOCYTES # BLD AUTO: 1.1 10E9/L (ref 0–1.3)
MONOCYTES NFR BLD AUTO: 10 %
MUCOUS THREADS #/AREA URNS LPF: PRESENT /LPF
NEUTROPHILS # BLD AUTO: 7.3 10E9/L (ref 1.6–8.3)
NEUTROPHILS NFR BLD AUTO: 64.8 %
NITRATE UR QL: NEGATIVE
NRBC # BLD AUTO: 0 10*3/UL
NRBC BLD AUTO-RTO: 0 /100
PH UR STRIP: 8.5 PH (ref 5–7)
PLATELET # BLD AUTO: 215 10E9/L (ref 150–450)
POTASSIUM SERPL-SCNC: 4.1 MMOL/L (ref 3.4–5.3)
PROT SERPL-MCNC: 7.7 G/DL (ref 6.8–8.8)
RBC # BLD AUTO: 4.58 10E12/L (ref 4.4–5.9)
RBC #/AREA URNS AUTO: 0 /HPF (ref 0–2)
SODIUM SERPL-SCNC: 139 MMOL/L (ref 133–144)
SOURCE: ABNORMAL
SP GR UR STRIP: 1.01 (ref 1–1.03)
SPECIMEN SOURCE: NORMAL
UROBILINOGEN UR STRIP-MCNC: 4 MG/DL (ref 0–2)
WBC # BLD AUTO: 11.2 10E9/L (ref 4–11)
WBC #/AREA URNS AUTO: 1 /HPF (ref 0–5)

## 2019-04-23 PROCEDURE — 71046 X-RAY EXAM CHEST 2 VIEWS: CPT

## 2019-04-23 PROCEDURE — 81001 URINALYSIS AUTO W/SCOPE: CPT | Performed by: EMERGENCY MEDICINE

## 2019-04-23 PROCEDURE — 87804 INFLUENZA ASSAY W/OPTIC: CPT | Performed by: EMERGENCY MEDICINE

## 2019-04-23 PROCEDURE — 25000128 H RX IP 250 OP 636: Performed by: EMERGENCY MEDICINE

## 2019-04-23 PROCEDURE — A9270 NON-COVERED ITEM OR SERVICE: HCPCS | Mod: GY | Performed by: EMERGENCY MEDICINE

## 2019-04-23 PROCEDURE — 25000132 ZZH RX MED GY IP 250 OP 250 PS 637: Mod: GY | Performed by: EMERGENCY MEDICINE

## 2019-04-23 RX ORDER — AZITHROMYCIN 250 MG/1
500 TABLET, FILM COATED ORAL ONCE
Status: DISCONTINUED | OUTPATIENT
Start: 2019-04-23 | End: 2019-04-23 | Stop reason: CLARIF

## 2019-04-23 RX ORDER — AZITHROMYCIN 250 MG/1
250 TABLET, FILM COATED ORAL DAILY
Qty: 4 TABLET | Refills: 0 | Status: ON HOLD | OUTPATIENT
Start: 2019-04-23 | End: 2019-05-07

## 2019-04-23 RX ORDER — AZITHROMYCIN 250 MG/1
500 TABLET, FILM COATED ORAL ONCE
Status: COMPLETED | OUTPATIENT
Start: 2019-04-23 | End: 2019-04-23

## 2019-04-23 RX ADMIN — AZITHROMYCIN MONOHYDRATE 500 MG: 250 TABLET ORAL at 01:09

## 2019-04-23 RX ADMIN — SODIUM CHLORIDE 1000 ML: 9 INJECTION, SOLUTION INTRAVENOUS at 01:09

## 2019-04-23 RX ADMIN — AMOXICILLIN AND CLAVULANATE POTASSIUM 1 TABLET: 875; 125 TABLET, FILM COATED ORAL at 01:09

## 2019-04-23 RX ADMIN — ACETAMINOPHEN 650 MG: 160 SUSPENSION ORAL at 00:34

## 2019-04-23 ASSESSMENT — ENCOUNTER SYMPTOMS
WEAKNESS: 1
DYSURIA: 0
DIARRHEA: 0
VOMITING: 0
SHORTNESS OF BREATH: 0
HEADACHES: 0
FEVER: 1
COUGH: 1
RHINORRHEA: 0
SORE THROAT: 0

## 2019-04-23 NOTE — DISCHARGE INSTRUCTIONS
Please return to the emergency department for any persistent fevers, worsening weakness, worsening shortness of breath.  Follow-up with your regular doctor in 1 to 2 days for recheck.

## 2019-04-23 NOTE — ED PROVIDER NOTES
History     Chief Complaint:  Generalized Weakness and Fever     HPI   HPI limited secondary to patient's nonverbal status and TBI.     Keyon Farias is a 58 year old male with a history of pneumonia, cerebral infarction, epilepsy, and TBI who presents via EMS to the Emergency Department today for evaluation of generalized weakness and fever. Patient lives with his mother and a caregiver. EMS report mother noticed increased weakness and fever for the last day. Patient has a cough. Patient denies pain. No shortness of breath. No rhinorrhea or sore throat. No headache. No pain with urination. No rash. No vomiting or diarrhea.    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 No pertinent family history.     Social History:  Smoking status: Former smoker, quit date 4/23/1989  Alcohol use: No  Marital Status:  Single [1]     Review of Systems   Unable to perform ROS: Patient nonverbal   Constitutional: Positive for fever.   HENT: Negative for rhinorrhea and sore throat.    Respiratory: Positive for cough. Negative for shortness of breath.    Cardiovascular: Negative for chest pain.   Gastrointestinal:  Negative for diarrhea and vomiting.   Genitourinary: Negative for dysuria.   Skin: Negative for rash.   Neurological: Positive for weakness. Negative for headaches.       Physical Exam     Patient Vitals for the past 24 hrs:   BP Temp Temp src Pulse Heart Rate Resp SpO2   04/23/19 0130 133/67 -- -- 102 93 24 96 %   04/23/19 0100 122/67 -- -- 99 98 28 96 %   04/23/19 0000 111/70 -- -- 101 101 25 93 %   04/22/19 2335 114/79 99  F (37.2  C) Oral -- 112 22 92 %       Physical Exam  Constitutional: vitals reviewed  HENT: Moist oral mucosa.  No posterior pharyngeal erythema or exudates.  Patient has some thick secretions in the posterior oropharynx.  Eyes: Grossly normal vision. Pupils are equal and round. Extraocular movements intact.  Sclera clear and without icterus.   Cardiovascular: Rapid rate. Regular rhythm. S1 and S2 without audible murmurs. 2+ radial pulses. Normal capillary refill.  Respiratory: Effort normal. Clear breath sounds bilaterally.  Intermittent dry cough.  Gastrointestinal: Soft. No distension. No tenderness to palpation. No rebound or guarding.  G-tube in place.  : Circumcised.  No erythema or swelling.  Neurologic: Alert and awake.  Nonverbal.  Able to follow commands and answer questions with head nodding and shaking of head.  Decreased strength throughout and significantly decreased strength in the lower extremities bilaterally.  Skin: Mild erythema over the sacral area and gluteal region bilaterally.  No skin breakdown, induration, fluctuance.  Musculoskeletal: No lower extremity edema. No gross deformity. No joint swelling.      Emergency Department Course     ECG (23:36:57):  Rate 107 bpm. VT interval 146. QRS duration 94. QT/QTc 338/451. P-R-T axes 57 -47 55. Sinus tachycardia. Left anterior fascicular block. Abnormal ECG. No significant change when compared to EKG dated 3/9/19. Interpreted at 0005 by Tristian Wilson MD.    Imaging:  Radiographic findings were communicated with the patient  and family who voiced understanding of the findings.  XR Chest 2 Views  IMPRESSION:  1. A few band-like opacities in bilateral lung bases most likely  represent atelectasis or scarring. Pneumonia cannot be excluded.  2. No other findings suspicious for active cardiopulmonary disease. As read by radiology.    Laboratory:  CBC: WBC 11.2 (H), HGB 12.6 (L) o/w  WNL ()  CMP: Glucose 125 (H), Bilirubin 0.1 (L), Albumin 2.9 (L) o/w WNL (Creatinine 0.80)    2348: ISTAT gases lactate gabe POCT: WNL (Lactic acid 1.6)    UA: pH 8.5, Albumin 10, Urobilinogen 4.0, Mucous present, o/w negative    Influenza A/B antigen: Negative    Interventions:  2354: NS 1L IV Bolus  0034: Tylenol 650 MG Per G tube  0109: Augmentin 875-125 MG Per G tube  0109: Zithromax 500 MG Per G tube  0109: NS 1L IV Bolus    Emergency Department Course:  Past medical records, nursing notes, and vitals reviewed.  2354: I performed an exam of the patient and obtained history, as documented above.    IV inserted and blood drawn.    The patient was sent for a chest x-ray while in the emergency department, findings above.    I rechecked the patient. Findings and plan explained to the Patient. Patient discharged home with instructions regarding supportive care, medications, and reasons to return. The importance of close follow-up was reviewed.     Impression & Plan      Medical Decision Making:  Patient who presents with reports of elevated temperature at home, cough, weakness over the last 1 day.  He has a history of recurrent admissions to this hospital for aspiration events and aspiration pneumonitis and pneumonia.  He is not hypoxic but is noted to have a dry cough.  There is some thick secretions in his posterior oropharynx that takes me concerned that he does not handle his secretions well and is likely recurrently aspirating.  X-ray shows evidence of possible atelectasis versus pneumonia bilaterally.  Labs are significant for mild leukocytosis but  normal lactate.  No evidence of urinary infection.  Low concern for intra-abdominal infection based on the absence of any tenderness.  No history of other infectious symptoms recently.  No other findings on exam concerning for other etiologies of infection.  I suspect that he might have had an aspiration event that led to generalized weakness mild tachycardia and malaise.  No elevated temperature here in the emergency department.  I see no indication for inpatient admission at this time, but given the constitutional changes in the setting of a likely aspiration event, I will start him on antibiotics for treatment of possible aspiration pneumonia.  He was given 2 L of IV fluid and his first dose of antibiotics via the G-tube.  This will be Augmentin and azithromycin.  Close follow-up was encouraged to the patient's mother who is agreeable to the plan.  Follow-up with primary care in 1 day and return precautions were discussed before persistent fever, shortness of breath, worsening mental status.  Patient left the emergency department in stable condition.    Diagnosis:    ICD-10-CM   1. Cough R05   2. Generalized muscle weakness M62.81   3. Sinus tachycardia R00.0     Disposition:  discharged to home    Discharge Medications:     Medication List      Started    azithromycin 250 MG tablet  Commonly known as:  ZITHROMAX Z-JA  250 mg, Oral, DAILY, Two tablets on the first day, then one tablet daily for the next 4 days           * amoxicillin-clavulanate 875-125 MG tablet  Commonly known as:  AUGMENTIN  1 tablet, Oral, 2 TIMES DAILY  What changed:  You were already taking a medication with the same name, and this prescription was added. Make sure you understand how and when to take each.          Shea Ramos  4/22/2019    EMERGENCY DEPARTMENT  Scribe Disclosure:  I, Shea Ramos, am serving as a scribe at 11:54 PM on 4/22/2019 to document services personally performed by Tristian Wilson MD based on my observations and  the provider's statements to me.        Tristian Wilson MD  04/23/19 [id number]

## 2019-04-23 NOTE — ED TRIAGE NOTES
Pt lives at home with mother. States that pt has been having increased weakness and fevers at home. Hx TBI, pt nonverbal.

## 2019-05-06 ENCOUNTER — APPOINTMENT (OUTPATIENT)
Dept: CT IMAGING | Facility: CLINIC | Age: 59
DRG: 178 | End: 2019-05-06
Attending: EMERGENCY MEDICINE
Payer: MEDICARE

## 2019-05-06 ENCOUNTER — HOSPITAL ENCOUNTER (INPATIENT)
Facility: CLINIC | Age: 59
LOS: 2 days | Discharge: HOME HEALTH | DRG: 178 | End: 2019-05-09
Attending: EMERGENCY MEDICINE | Admitting: STUDENT IN AN ORGANIZED HEALTH CARE EDUCATION/TRAINING PROGRAM
Payer: MEDICARE

## 2019-05-06 DIAGNOSIS — R11.10 VOMITING, INTRACTABILITY OF VOMITING NOT SPECIFIED, PRESENCE OF NAUSEA NOT SPECIFIED, UNSPECIFIED VOMITING TYPE: ICD-10-CM

## 2019-05-06 DIAGNOSIS — J18.9 PNEUMONIA OF RIGHT LOWER LOBE DUE TO INFECTIOUS ORGANISM: ICD-10-CM

## 2019-05-06 DIAGNOSIS — J69.0 ASPIRATION PNEUMONIA OF BOTH LOWER LOBES DUE TO GASTRIC SECRETIONS (H): Primary | ICD-10-CM

## 2019-05-06 DIAGNOSIS — R56.9 SEIZURE (H): ICD-10-CM

## 2019-05-06 LAB
ALBUMIN SERPL-MCNC: 3 G/DL (ref 3.4–5)
ALBUMIN UR-MCNC: 10 MG/DL
ALP SERPL-CCNC: 112 U/L (ref 40–150)
ALT SERPL W P-5'-P-CCNC: 26 U/L (ref 0–70)
AMORPH CRY #/AREA URNS HPF: ABNORMAL /HPF
ANION GAP SERPL CALCULATED.3IONS-SCNC: 6 MMOL/L (ref 3–14)
APPEARANCE UR: ABNORMAL
AST SERPL W P-5'-P-CCNC: 28 U/L (ref 0–45)
BASOPHILS # BLD AUTO: 0.1 10E9/L (ref 0–0.2)
BASOPHILS NFR BLD AUTO: 0.5 %
BILIRUB SERPL-MCNC: 0.2 MG/DL (ref 0.2–1.3)
BILIRUB UR QL STRIP: NEGATIVE
BUN SERPL-MCNC: 13 MG/DL (ref 7–30)
CALCIUM SERPL-MCNC: 8.4 MG/DL (ref 8.5–10.1)
CHLORIDE SERPL-SCNC: 101 MMOL/L (ref 94–109)
CO2 SERPL-SCNC: 28 MMOL/L (ref 20–32)
COLOR UR AUTO: YELLOW
CREAT SERPL-MCNC: 0.68 MG/DL (ref 0.66–1.25)
DIFFERENTIAL METHOD BLD: ABNORMAL
EOSINOPHIL # BLD AUTO: 0.7 10E9/L (ref 0–0.7)
EOSINOPHIL NFR BLD AUTO: 4.4 %
ERYTHROCYTE [DISTWIDTH] IN BLOOD BY AUTOMATED COUNT: 15.7 % (ref 10–15)
GFR SERPL CREATININE-BSD FRML MDRD: >90 ML/MIN/{1.73_M2}
GLUCOSE SERPL-MCNC: 98 MG/DL (ref 70–99)
GLUCOSE UR STRIP-MCNC: NEGATIVE MG/DL
HCT VFR BLD AUTO: 39.9 % (ref 40–53)
HGB BLD-MCNC: 13.1 G/DL (ref 13.3–17.7)
HGB UR QL STRIP: NEGATIVE
IMM GRANULOCYTES # BLD: 0 10E9/L (ref 0–0.4)
IMM GRANULOCYTES NFR BLD: 0.2 %
KETONES UR STRIP-MCNC: NEGATIVE MG/DL
LACTATE BLD-SCNC: 1.8 MMOL/L (ref 0.7–2)
LEUKOCYTE ESTERASE UR QL STRIP: NEGATIVE
LIPASE SERPL-CCNC: 489 U/L (ref 73–393)
LYMPHOCYTES # BLD AUTO: 2.8 10E9/L (ref 0.8–5.3)
LYMPHOCYTES NFR BLD AUTO: 19.1 %
MCH RBC QN AUTO: 27.2 PG (ref 26.5–33)
MCHC RBC AUTO-ENTMCNC: 32.8 G/DL (ref 31.5–36.5)
MCV RBC AUTO: 83 FL (ref 78–100)
MONOCYTES # BLD AUTO: 1.2 10E9/L (ref 0–1.3)
MONOCYTES NFR BLD AUTO: 8 %
NEUTROPHILS # BLD AUTO: 9.9 10E9/L (ref 1.6–8.3)
NEUTROPHILS NFR BLD AUTO: 67.8 %
NITRATE UR QL: NEGATIVE
NRBC # BLD AUTO: 0 10*3/UL
NRBC BLD AUTO-RTO: 0 /100
PH UR STRIP: 8.5 PH (ref 5–7)
PLATELET # BLD AUTO: 192 10E9/L (ref 150–450)
POTASSIUM SERPL-SCNC: 4.3 MMOL/L (ref 3.4–5.3)
PROT SERPL-MCNC: 7.7 G/DL (ref 6.8–8.8)
RBC # BLD AUTO: 4.81 10E12/L (ref 4.4–5.9)
RBC #/AREA URNS AUTO: 0 /HPF (ref 0–2)
SODIUM SERPL-SCNC: 135 MMOL/L (ref 133–144)
SOURCE: ABNORMAL
SP GR UR STRIP: 1.02 (ref 1–1.03)
UROBILINOGEN UR STRIP-MCNC: 2 MG/DL (ref 0–2)
WBC # BLD AUTO: 14.7 10E9/L (ref 4–11)
WBC #/AREA URNS AUTO: 1 /HPF (ref 0–5)

## 2019-05-06 PROCEDURE — 83690 ASSAY OF LIPASE: CPT | Performed by: EMERGENCY MEDICINE

## 2019-05-06 PROCEDURE — 25000125 ZZHC RX 250: Performed by: EMERGENCY MEDICINE

## 2019-05-06 PROCEDURE — 80053 COMPREHEN METABOLIC PANEL: CPT | Performed by: EMERGENCY MEDICINE

## 2019-05-06 PROCEDURE — 71260 CT THORAX DX C+: CPT

## 2019-05-06 PROCEDURE — 25000128 H RX IP 250 OP 636: Performed by: EMERGENCY MEDICINE

## 2019-05-06 PROCEDURE — 74177 CT ABD & PELVIS W/CONTRAST: CPT

## 2019-05-06 PROCEDURE — 99285 EMERGENCY DEPT VISIT HI MDM: CPT | Mod: 25

## 2019-05-06 PROCEDURE — 87040 BLOOD CULTURE FOR BACTERIA: CPT | Performed by: EMERGENCY MEDICINE

## 2019-05-06 PROCEDURE — 96361 HYDRATE IV INFUSION ADD-ON: CPT

## 2019-05-06 PROCEDURE — 87086 URINE CULTURE/COLONY COUNT: CPT | Performed by: EMERGENCY MEDICINE

## 2019-05-06 PROCEDURE — 93005 ELECTROCARDIOGRAM TRACING: CPT

## 2019-05-06 PROCEDURE — 83605 ASSAY OF LACTIC ACID: CPT | Performed by: EMERGENCY MEDICINE

## 2019-05-06 PROCEDURE — 85025 COMPLETE CBC W/AUTO DIFF WBC: CPT | Performed by: EMERGENCY MEDICINE

## 2019-05-06 PROCEDURE — 81001 URINALYSIS AUTO W/SCOPE: CPT | Performed by: EMERGENCY MEDICINE

## 2019-05-06 PROCEDURE — 25800030 ZZH RX IP 258 OP 636: Performed by: EMERGENCY MEDICINE

## 2019-05-06 RX ORDER — SODIUM CHLORIDE, SODIUM LACTATE, POTASSIUM CHLORIDE, CALCIUM CHLORIDE 600; 310; 30; 20 MG/100ML; MG/100ML; MG/100ML; MG/100ML
1000 INJECTION, SOLUTION INTRAVENOUS CONTINUOUS
Status: DISCONTINUED | OUTPATIENT
Start: 2019-05-06 | End: 2019-05-07

## 2019-05-06 RX ORDER — IOPAMIDOL 755 MG/ML
80 INJECTION, SOLUTION INTRAVASCULAR ONCE
Status: COMPLETED | OUTPATIENT
Start: 2019-05-06 | End: 2019-05-06

## 2019-05-06 RX ADMIN — IOPAMIDOL 80 ML: 755 INJECTION, SOLUTION INTRAVENOUS at 23:48

## 2019-05-06 RX ADMIN — SODIUM CHLORIDE, POTASSIUM CHLORIDE, SODIUM LACTATE AND CALCIUM CHLORIDE 1000 ML: 600; 310; 30; 20 INJECTION, SOLUTION INTRAVENOUS at 23:02

## 2019-05-06 RX ADMIN — SODIUM CHLORIDE 64 ML: 9 INJECTION, SOLUTION INTRAVENOUS at 23:48

## 2019-05-06 NOTE — LETTER
Transition Communication Hand-off for Care Transitions to Next Level of Care Provider    Name: Keyon Farias  : 1960  MRN #: [id number]  Primary Care Provider: Carlos Gomez  Primary Care MD Name: Carlos Gomez  Primary Clinic: [address] [address]     Reason for Hospitalization:  Pneumonia of right lower lobe due to infectious organism (H) [J18.1]  Vomiting, intractability of vomiting not specified, presence of nausea not specified, unspecified vomiting type [R11.10]  Admit Date/Time: 2019 10:23 PM  Discharge Date:  or 5/10   Payor Source: Payor: MEDICARE / Plan: MEDICARE / Product Type: Medicare /     Readmission Assessment Measure (HANS) Risk Score/category: extreme           Reason for Communication Hand-off Referral: Admission diagnoses: PN  Fragility  Difficulty understanding plan of care  Multiple providers/specialties    Discharge Plan: patient readmitted to [facility] for recurrent aspiration pna.  Patient has had over 8 admissions in 2019 for the same diagnosis.  Patient is on Tube feeds (usually 100 ml/h on a 12H cycle) and provider decreased the TF to 50 ml/hour due to high residuals and coughing.  Patient received IV antibiotics during this stay.  Plan is for the patient to have increased TF for 100 ml/hour with free water flushes and discharge to home with resumption of SN/PCA services.  Patients mother is his guardian and was currently hospitalized during Keyon's admission.  We are awaiting the discharging provider to talk to the patients mother and answer questions for discharge.  Patient is scheduled for PCP follow up on  at 1:30 p.m.        Concern for non-adherence with plan of care:   Y/N n  Discharge Needs Assessment:  Needs      Most Recent Value   # of Referrals Placed by CTS  Scheduled Follow-up appointments, Communication hand-offs to next level of Care Providers [PCP follow up ]          Already enrolled in Tele-monitoring program and  name of program:  n/a  Follow-up specialty is recommended: No    Follow-up plan:  No future appointments.    Any outstanding tests or procedures:              Key Recommendations:  Discharge to home 5/9 vs 5/10 resumption of homecare services and TF's.  Patient will continue to be at high risk for recurrent aspiration pna.  Palliative care has been involved in the past but Guardian is hopeful that patient will resume a PO diet in the future.  Patient's Guardian was not present during the patients admission this stay.     Ingris Scott    AVS/Discharge Summary is the source of truth; this is a helpful guide for improved communication of patient story

## 2019-05-07 LAB
ANION GAP SERPL CALCULATED.3IONS-SCNC: 7 MMOL/L (ref 3–14)
BUN SERPL-MCNC: 11 MG/DL (ref 7–30)
CALCIUM SERPL-MCNC: 8.7 MG/DL (ref 8.5–10.1)
CHLORIDE SERPL-SCNC: 105 MMOL/L (ref 94–109)
CO2 SERPL-SCNC: 25 MMOL/L (ref 20–32)
CREAT SERPL-MCNC: 0.85 MG/DL (ref 0.66–1.25)
ERYTHROCYTE [DISTWIDTH] IN BLOOD BY AUTOMATED COUNT: 15.5 % (ref 10–15)
GFR SERPL CREATININE-BSD FRML MDRD: >90 ML/MIN/{1.73_M2}
GLUCOSE BLDC GLUCOMTR-MCNC: 127 MG/DL (ref 70–99)
GLUCOSE BLDC GLUCOMTR-MCNC: 141 MG/DL (ref 70–99)
GLUCOSE BLDC GLUCOMTR-MCNC: 161 MG/DL (ref 70–99)
GLUCOSE BLDC GLUCOMTR-MCNC: 174 MG/DL (ref 70–99)
GLUCOSE BLDC GLUCOMTR-MCNC: 227 MG/DL (ref 70–99)
GLUCOSE BLDC GLUCOMTR-MCNC: 82 MG/DL (ref 70–99)
GLUCOSE SERPL-MCNC: 158 MG/DL (ref 70–99)
HBA1C MFR BLD: 6.1 % (ref 0–5.6)
HCT VFR BLD AUTO: 38.1 % (ref 40–53)
HGB BLD-MCNC: 12.4 G/DL (ref 13.3–17.7)
INTERPRETATION ECG - MUSE: NORMAL
MCH RBC QN AUTO: 27 PG (ref 26.5–33)
MCHC RBC AUTO-ENTMCNC: 32.5 G/DL (ref 31.5–36.5)
MCV RBC AUTO: 83 FL (ref 78–100)
PLATELET # BLD AUTO: 174 10E9/L (ref 150–450)
POTASSIUM SERPL-SCNC: 4.6 MMOL/L (ref 3.4–5.3)
RBC # BLD AUTO: 4.6 10E12/L (ref 4.4–5.9)
SODIUM SERPL-SCNC: 137 MMOL/L (ref 133–144)
WBC # BLD AUTO: 9.9 10E9/L (ref 4–11)

## 2019-05-07 PROCEDURE — 00000146 ZZHCL STATISTIC GLUCOSE BY METER IP

## 2019-05-07 PROCEDURE — 96374 THER/PROPH/DIAG INJ IV PUSH: CPT | Mod: 59

## 2019-05-07 PROCEDURE — 25000128 H RX IP 250 OP 636: Performed by: STUDENT IN AN ORGANIZED HEALTH CARE EDUCATION/TRAINING PROGRAM

## 2019-05-07 PROCEDURE — 25800030 ZZH RX IP 258 OP 636: Performed by: STUDENT IN AN ORGANIZED HEALTH CARE EDUCATION/TRAINING PROGRAM

## 2019-05-07 PROCEDURE — 99207 ZZC NON-BILLABLE SERV PER CHARTING: CPT | Performed by: INTERNAL MEDICINE

## 2019-05-07 PROCEDURE — G0463 HOSPITAL OUTPT CLINIC VISIT: HCPCS

## 2019-05-07 PROCEDURE — 25800030 ZZH RX IP 258 OP 636: Performed by: EMERGENCY MEDICINE

## 2019-05-07 PROCEDURE — 87040 BLOOD CULTURE FOR BACTERIA: CPT | Performed by: EMERGENCY MEDICINE

## 2019-05-07 PROCEDURE — 99223 1ST HOSP IP/OBS HIGH 75: CPT | Mod: AI | Performed by: STUDENT IN AN ORGANIZED HEALTH CARE EDUCATION/TRAINING PROGRAM

## 2019-05-07 PROCEDURE — 94640 AIRWAY INHALATION TREATMENT: CPT | Mod: 76

## 2019-05-07 PROCEDURE — 25000128 H RX IP 250 OP 636: Performed by: EMERGENCY MEDICINE

## 2019-05-07 PROCEDURE — 96375 TX/PRO/DX INJ NEW DRUG ADDON: CPT

## 2019-05-07 PROCEDURE — 94640 AIRWAY INHALATION TREATMENT: CPT

## 2019-05-07 PROCEDURE — 25000125 ZZHC RX 250: Performed by: INTERNAL MEDICINE

## 2019-05-07 PROCEDURE — 36415 COLL VENOUS BLD VENIPUNCTURE: CPT | Performed by: STUDENT IN AN ORGANIZED HEALTH CARE EDUCATION/TRAINING PROGRAM

## 2019-05-07 PROCEDURE — 25000132 ZZH RX MED GY IP 250 OP 250 PS 637: Performed by: EMERGENCY MEDICINE

## 2019-05-07 PROCEDURE — 25000132 ZZH RX MED GY IP 250 OP 250 PS 637: Performed by: STUDENT IN AN ORGANIZED HEALTH CARE EDUCATION/TRAINING PROGRAM

## 2019-05-07 PROCEDURE — A9270 NON-COVERED ITEM OR SERVICE: HCPCS | Performed by: EMERGENCY MEDICINE

## 2019-05-07 PROCEDURE — A9270 NON-COVERED ITEM OR SERVICE: HCPCS | Performed by: STUDENT IN AN ORGANIZED HEALTH CARE EDUCATION/TRAINING PROGRAM

## 2019-05-07 PROCEDURE — 12000000 ZZH R&B MED SURG/OB

## 2019-05-07 PROCEDURE — 80048 BASIC METABOLIC PNL TOTAL CA: CPT | Performed by: STUDENT IN AN ORGANIZED HEALTH CARE EDUCATION/TRAINING PROGRAM

## 2019-05-07 PROCEDURE — 25000131 ZZH RX MED GY IP 250 OP 636 PS 637: Performed by: INTERNAL MEDICINE

## 2019-05-07 PROCEDURE — 40000275 ZZH STATISTIC RCP TIME EA 10 MIN

## 2019-05-07 PROCEDURE — 27210429 ZZH NUTRITION PRODUCT INTERMEDIATE LITER

## 2019-05-07 PROCEDURE — 85027 COMPLETE CBC AUTOMATED: CPT | Performed by: STUDENT IN AN ORGANIZED HEALTH CARE EDUCATION/TRAINING PROGRAM

## 2019-05-07 PROCEDURE — 96361 HYDRATE IV INFUSION ADD-ON: CPT

## 2019-05-07 PROCEDURE — 25000132 ZZH RX MED GY IP 250 OP 250 PS 637: Performed by: INTERNAL MEDICINE

## 2019-05-07 PROCEDURE — 25000125 ZZHC RX 250

## 2019-05-07 PROCEDURE — 83036 HEMOGLOBIN GLYCOSYLATED A1C: CPT | Performed by: STUDENT IN AN ORGANIZED HEALTH CARE EDUCATION/TRAINING PROGRAM

## 2019-05-07 RX ORDER — DEXTROSE MONOHYDRATE 25 G/50ML
25-50 INJECTION, SOLUTION INTRAVENOUS
Status: DISCONTINUED | OUTPATIENT
Start: 2019-05-07 | End: 2019-05-09 | Stop reason: HOSPADM

## 2019-05-07 RX ORDER — SODIUM CHLORIDE 9 MG/ML
INJECTION, SOLUTION INTRAVENOUS CONTINUOUS
Status: DISCONTINUED | OUTPATIENT
Start: 2019-05-07 | End: 2019-05-08

## 2019-05-07 RX ORDER — NALOXONE HYDROCHLORIDE 0.4 MG/ML
.1-.4 INJECTION, SOLUTION INTRAMUSCULAR; INTRAVENOUS; SUBCUTANEOUS
Status: DISCONTINUED | OUTPATIENT
Start: 2019-05-07 | End: 2019-05-09 | Stop reason: HOSPADM

## 2019-05-07 RX ORDER — PIPERACILLIN SODIUM, TAZOBACTAM SODIUM 3; .375 G/15ML; G/15ML
3.38 INJECTION, POWDER, LYOPHILIZED, FOR SOLUTION INTRAVENOUS EVERY 6 HOURS
Status: DISCONTINUED | OUTPATIENT
Start: 2019-05-07 | End: 2019-05-09 | Stop reason: HOSPADM

## 2019-05-07 RX ORDER — ACETAMINOPHEN 500 MG
1000 TABLET ORAL EVERY 6 HOURS PRN
Status: DISCONTINUED | OUTPATIENT
Start: 2019-05-07 | End: 2019-05-09 | Stop reason: HOSPADM

## 2019-05-07 RX ORDER — ALBUTEROL SULFATE 0.83 MG/ML
2.5 SOLUTION RESPIRATORY (INHALATION)
Status: DISCONTINUED | OUTPATIENT
Start: 2019-05-07 | End: 2019-05-09 | Stop reason: HOSPADM

## 2019-05-07 RX ORDER — GUAR GUM
1 PACKET (EA) ORAL DAILY
Status: DISCONTINUED | OUTPATIENT
Start: 2019-05-07 | End: 2019-05-09 | Stop reason: HOSPADM

## 2019-05-07 RX ORDER — ALBUTEROL SULFATE 5 MG/ML
2.5 SOLUTION RESPIRATORY (INHALATION) 4 TIMES DAILY
Status: DISCONTINUED | OUTPATIENT
Start: 2019-05-07 | End: 2019-05-07

## 2019-05-07 RX ORDER — ALBUTEROL SULFATE 5 MG/ML
2.5 SOLUTION RESPIRATORY (INHALATION)
Status: DISCONTINUED | OUTPATIENT
Start: 2019-05-07 | End: 2019-05-07 | Stop reason: CLARIF

## 2019-05-07 RX ORDER — ALBUTEROL SULFATE 5 MG/ML
2.5 SOLUTION RESPIRATORY (INHALATION)
COMMUNITY
End: 2022-12-01

## 2019-05-07 RX ORDER — ACETYLCYSTEINE 200 MG/ML
2 SOLUTION ORAL; RESPIRATORY (INHALATION) 4 TIMES DAILY
Status: DISCONTINUED | OUTPATIENT
Start: 2019-05-07 | End: 2019-05-09 | Stop reason: HOSPADM

## 2019-05-07 RX ORDER — PIPERACILLIN SODIUM, TAZOBACTAM SODIUM 4; .5 G/20ML; G/20ML
4.5 INJECTION, POWDER, LYOPHILIZED, FOR SOLUTION INTRAVENOUS ONCE
Status: COMPLETED | OUTPATIENT
Start: 2019-05-07 | End: 2019-05-07

## 2019-05-07 RX ORDER — ALBUTEROL SULFATE 0.83 MG/ML
SOLUTION RESPIRATORY (INHALATION)
Status: COMPLETED
Start: 2019-05-07 | End: 2019-05-07

## 2019-05-07 RX ORDER — NICOTINE POLACRILEX 4 MG
15-30 LOZENGE BUCCAL
Status: DISCONTINUED | OUTPATIENT
Start: 2019-05-07 | End: 2019-05-09 | Stop reason: HOSPADM

## 2019-05-07 RX ORDER — CARBAMAZEPINE 100 MG/5ML
150 SUSPENSION ORAL EVERY 6 HOURS
Status: DISCONTINUED | OUTPATIENT
Start: 2019-05-07 | End: 2019-05-09 | Stop reason: HOSPADM

## 2019-05-07 RX ADMIN — ACETAMINOPHEN 650 MG: 160 SUSPENSION ORAL at 01:51

## 2019-05-07 RX ADMIN — PIPERACILLIN SODIUM,TAZOBACTAM SODIUM 3.38 G: 3; .375 INJECTION, POWDER, FOR SOLUTION INTRAVENOUS at 09:39

## 2019-05-07 RX ADMIN — ALBUTEROL SULFATE 2.5 MG: 2.5 SOLUTION RESPIRATORY (INHALATION) at 07:11

## 2019-05-07 RX ADMIN — BRIVARACETAM 100 MG: 10 SOLUTION ORAL at 09:27

## 2019-05-07 RX ADMIN — ACETYLCYSTEINE 2 ML: 200 SOLUTION ORAL; RESPIRATORY (INHALATION) at 21:50

## 2019-05-07 RX ADMIN — ACETYLCYSTEINE 2 ML: 200 SOLUTION ORAL; RESPIRATORY (INHALATION) at 14:53

## 2019-05-07 RX ADMIN — PIPERACILLIN SODIUM,TAZOBACTAM SODIUM 4.5 G: 4; .5 INJECTION, POWDER, FOR SOLUTION INTRAVENOUS at 01:49

## 2019-05-07 RX ADMIN — CARBAMAZEPINE 150 MG: 100 SUSPENSION ORAL at 02:57

## 2019-05-07 RX ADMIN — HYDROCORTISONE SODIUM SUCCINATE 50 MG: 100 INJECTION, POWDER, FOR SOLUTION INTRAMUSCULAR; INTRAVENOUS at 13:24

## 2019-05-07 RX ADMIN — HYDROCORTISONE SODIUM SUCCINATE 50 MG: 100 INJECTION, POWDER, FOR SOLUTION INTRAMUSCULAR; INTRAVENOUS at 01:42

## 2019-05-07 RX ADMIN — SODIUM CHLORIDE: 9 INJECTION, SOLUTION INTRAVENOUS at 02:57

## 2019-05-07 RX ADMIN — ALBUTEROL SULFATE 2.5 MG: 2.5 SOLUTION RESPIRATORY (INHALATION) at 22:01

## 2019-05-07 RX ADMIN — LEVOTHYROXINE SODIUM 137 MCG: 112 TABLET ORAL at 05:56

## 2019-05-07 RX ADMIN — MICONAZOLE NITRATE: 2 POWDER TOPICAL at 16:13

## 2019-05-07 RX ADMIN — CARBAMAZEPINE 150 MG: 100 SUSPENSION ORAL at 21:46

## 2019-05-07 RX ADMIN — INSULIN ASPART 1 UNITS: 100 INJECTION, SOLUTION INTRAVENOUS; SUBCUTANEOUS at 20:29

## 2019-05-07 RX ADMIN — CARBAMAZEPINE 150 MG: 100 SUSPENSION ORAL at 09:39

## 2019-05-07 RX ADMIN — BRIVARACETAM 100 MG: 10 SOLUTION ORAL at 21:46

## 2019-05-07 RX ADMIN — Medication 40 MG: at 09:39

## 2019-05-07 RX ADMIN — ALBUTEROL SULFATE 2.5 MG: 2.5 SOLUTION RESPIRATORY (INHALATION) at 23:06

## 2019-05-07 RX ADMIN — PIPERACILLIN SODIUM,TAZOBACTAM SODIUM 3.38 G: 3; .375 INJECTION, POWDER, FOR SOLUTION INTRAVENOUS at 13:27

## 2019-05-07 RX ADMIN — ENOXAPARIN SODIUM 40 MG: 40 INJECTION SUBCUTANEOUS at 02:57

## 2019-05-07 RX ADMIN — HYDROCORTISONE SODIUM SUCCINATE 50 MG: 100 INJECTION, POWDER, FOR SOLUTION INTRAMUSCULAR; INTRAVENOUS at 05:56

## 2019-05-07 RX ADMIN — PIPERACILLIN SODIUM,TAZOBACTAM SODIUM 3.38 G: 3; .375 INJECTION, POWDER, FOR SOLUTION INTRAVENOUS at 20:13

## 2019-05-07 RX ADMIN — MICONAZOLE NITRATE: 2 POWDER TOPICAL at 20:31

## 2019-05-07 RX ADMIN — Medication 1 PACKET: at 15:47

## 2019-05-07 RX ADMIN — ALBUTEROL SULFATE 2.5 MG: 2.5 SOLUTION RESPIRATORY (INHALATION) at 14:54

## 2019-05-07 RX ADMIN — SODIUM CHLORIDE: 9 INJECTION, SOLUTION INTRAVENOUS at 13:24

## 2019-05-07 RX ADMIN — SODIUM CHLORIDE, POTASSIUM CHLORIDE, SODIUM LACTATE AND CALCIUM CHLORIDE 1000 ML: 600; 310; 30; 20 INJECTION, SOLUTION INTRAVENOUS at 00:05

## 2019-05-07 RX ADMIN — CARBAMAZEPINE 150 MG: 100 SUSPENSION ORAL at 13:31

## 2019-05-07 ASSESSMENT — ACTIVITIES OF DAILY LIVING (ADL)
COGNITION: 2 - DIFFICULTY WITH ORGANIZING THOUGHTS
ADLS_ACUITY_SCORE: 43
FALL_HISTORY_WITHIN_LAST_SIX_MONTHS: NO
BATHING: 4-->COMPLETELY DEPENDENT
ADLS_ACUITY_SCORE: 43
ADLS_ACUITY_SCORE: 45
TOILETING: 4-->COMPLETELY DEPENDENT
TRANSFERRING: 4-->COMPLETELY DEPENDENT
AMBULATION: 4-->COMPLETELY DEPENDENT
ADLS_ACUITY_SCORE: 45
RETIRED_COMMUNICATION: 2-->DIFFICULTY UNDERSTANDING AND SPEAKING (NOT RELATED TO LANGUAGE BARRIER)
SWALLOWING: 2-->DIFFICULTY SWALLOWING LIQUIDS/FOODS
ADLS_ACUITY_SCORE: 43
DRESS: 4-->COMPLETELY DEPENDENT
RETIRED_EATING: 4-->COMPLETELY DEPENDENT

## 2019-05-07 NOTE — PROGRESS NOTES
[facility]  Patient is currently open to home care services with [facility]. The patient is currently receiving Skilled Nursing services. [facility]  and team have been notified of patient admission. [facility] liaison will continue to follow patient during stay. If appropriate provide orders to resume home care at time of discharge.

## 2019-05-07 NOTE — PROGRESS NOTES
Writer called [facility] phone [phone] they provide 12 hour RN coverage Tu/W/F.    Fax orders to [phone] when complete attn Lisa    PCA coverage through [facility] phone [phone] 80.5 hours of coverage every 7 days.    Tube Feeding and supplies through [facility] phone [phone] fax orders with Nutrition notes to fax#[phone]     Patient is open to SN through [facility].  Awaiting Liaison to confirm if patient is receiving other services (was pending SLP per previous admission).    Will continue to follow for discharge planning.

## 2019-05-07 NOTE — ED NOTES
Pt. Bedding, gown, and depends changed after pt. Vomited.  Pt. Seen by hospitalist.  Pt. Remains alert with ocassional yes or no answers but mostly just grunting sounds.

## 2019-05-07 NOTE — PLAN OF CARE
Arrived to unit around 0230. VSS. Nonverbal, unable to assess orientation. Total cares. PEG tube clamped. Mepilex applied to sacrum, WOC consulted. Pulsate mattress ordered. NS infusing at 100 mL/hr. Plan to continue IV antibiotics.

## 2019-05-07 NOTE — ED NOTES
"[facility]  ED Nurse Handoff Report    ED Chief complaint: Fever      ED Diagnosis:   Final diagnoses:   Pneumonia of right lower lobe due to infectious organism (H)   Vomiting, intractability of vomiting not specified, presence of nausea not specified, unspecified vomiting type       Code Status: Full Code..... According to prior visits    Allergies:   Allergies   Allergen Reactions     Dilantin [Phenytoin Sodium]      Valproic Acid      Toxicity c bone marrow suspension, elevated ammonia levels        Activity level - Baseline/Home:  Total Care    Activity Level - Current:   Total Care     Needed?: No    Isolation: Yes  Infection: MRSA, VRE    Bariatric?: No    Vital Signs:   Vitals:    05/06/19 2300 05/06/19 2315 05/06/19 2330 05/07/19 0030   BP: 103/83  130/73 106/61   Pulse: 114  97 95   Resp:       Temp: 100.6  F (38.1  C)      TempSrc: Axillary      SpO2: 94% 97% 100%        Cardiac Rhythm: ,        Pain level:      Is this patient confused?: No   Does this patient have a guardian?  Yes         If yes, is there guardianship documents in the Epic \"Code/ACP\" activity?  Yes         Guardian Notified?  pts Mother who is legal guardian is hospitalized at this time at [facility].  Pt. sister has left number for contact.  [place] - Suicide Severity Rating Scale Completed?  Yes  If yes, what color did the patient score?  White    Patient Report: Initial Complaint: fever  Focused Assessment: low grade fever found pre care giver with concerns for aspiration pneumonia and sent for evaluation.  Pt. Has history of TBI with cerebral artery occlusion and cerebral infarction, spastic hemiplegia, and aphasia.  Has had admissions for sepsis due to pneumonia and UTIs in past.  Tests Performed: labs, ekg, blood cultures X 2, abdominal CT, UA  Abnormal Results:   Results for orders placed or performed during the hospital encounter of 05/06/19   CT Chest/Abdomen/Pelvis w Contrast    Narrative    CT " CHEST/ABDOMEN/PELVIS W CONTRAST  5/6/2019 11:51 PM    HISTORY: Sepsis. Cough. History of aspiration, vomiting.    TECHNIQUE: CT scan obtained of the chest, abdomen, and pelvis with  oral and IV contrast. 80 mL Isovue-370 injected. Radiation dose for  this scan was reduced using automated exposure control, adjustment of  the mA and/or kV according to patient size, or iterative  reconstruction technique.    COMPARISON: 12/18/2018. 5/16/2018.    FINDINGS:  Chest: Breathing motion artifact degrades images throughout the study.  There is a right lower lobe infiltrate consistent with pneumonia. Mild  dependent atelectasis bilaterally. No pneumothorax or pleural  effusion. There is a mildly enlarged subcarinal lymph node similar to  the previous exam. No hilar or axillary lymph node enlargement. The  heart size is normal.    Abdomen: There is a small cyst in the left lobe of the liver. The  spleen, gallbladder, adrenal glands and kidneys are normal in  appearance. There is a cyst in the pancreas tail measuring  approximately 3.9 x 3.1 cm, increased in size from 5/16/2018. There is  no abdominal or pelvic lymph node enlargement.    Pelvis: There is a large amount of stool in the rectosigmoid colon. No  bowel obstruction or inflammation. There is a GJ tube in place. No  free intraperitoneal gas or fluid.      Impression    IMPRESSION:  1. Right lower lobe pneumonia.  2. 3.9 cm cyst in the tail of the pancreas, increased in size since  5/16/2018. Because of the size and interval growth, biopsy or surgical  consultation should be considered.  3. Large amount of stool in the rectosigmoid colon. No bowel  obstruction.   CBC with platelets differential   Result Value Ref Range    WBC 14.7 (H) 4.0 - 11.0 10e9/L    RBC Count 4.81 4.4 - 5.9 10e12/L    Hemoglobin 13.1 (L) 13.3 - 17.7 g/dL    Hematocrit 39.9 (L) 40.0 - 53.0 %    MCV 83 78 - 100 fl    MCH 27.2 26.5 - 33.0 pg    MCHC 32.8 31.5 - 36.5 g/dL    RDW 15.7 (H) 10.0 - 15.0 %     Platelet Count 192 150 - 450 10e9/L    Diff Method Automated Method     % Neutrophils 67.8 %    % Lymphocytes 19.1 %    % Monocytes 8.0 %    % Eosinophils 4.4 %    % Basophils 0.5 %    % Immature Granulocytes 0.2 %    Nucleated RBCs 0 0 /100    Absolute Neutrophil 9.9 (H) 1.6 - 8.3 10e9/L    Absolute Lymphocytes 2.8 0.8 - 5.3 10e9/L    Absolute Monocytes 1.2 0.0 - 1.3 10e9/L    Absolute Eosinophils 0.7 0.0 - 0.7 10e9/L    Absolute Basophils 0.1 0.0 - 0.2 10e9/L    Abs Immature Granulocytes 0.0 0 - 0.4 10e9/L    Absolute Nucleated RBC 0.0    Comprehensive metabolic panel   Result Value Ref Range    Sodium 135 133 - 144 mmol/L    Potassium 4.3 3.4 - 5.3 mmol/L    Chloride 101 94 - 109 mmol/L    Carbon Dioxide 28 20 - 32 mmol/L    Anion Gap 6 3 - 14 mmol/L    Glucose 98 70 - 99 mg/dL    Urea Nitrogen 13 7 - 30 mg/dL    Creatinine 0.68 0.66 - 1.25 mg/dL    GFR Estimate >90 >60 mL/min/[1.73_m2]    GFR Estimate If Black >90 >60 mL/min/[1.73_m2]    Calcium 8.4 (L) 8.5 - 10.1 mg/dL    Bilirubin Total 0.2 0.2 - 1.3 mg/dL    Albumin 3.0 (L) 3.4 - 5.0 g/dL    Protein Total 7.7 6.8 - 8.8 g/dL    Alkaline Phosphatase 112 40 - 150 U/L    ALT 26 0 - 70 U/L    AST 28 0 - 45 U/L   Lipase   Result Value Ref Range    Lipase 489 (H) 73 - 393 U/L   Lactic acid whole blood   Result Value Ref Range    Lactic Acid 1.8 0.7 - 2.0 mmol/L   UA with Microscopic   Result Value Ref Range    Color Urine Yellow     Appearance Urine Slightly Cloudy     Glucose Urine Negative NEG^Negative mg/dL    Bilirubin Urine Negative NEG^Negative    Ketones Urine Negative NEG^Negative mg/dL    Specific Gravity Urine 1.016 1.003 - 1.035    Blood Urine Negative NEG^Negative    pH Urine 8.5 (H) 5.0 - 7.0 pH    Protein Albumin Urine 10 (A) NEG^Negative mg/dL    Urobilinogen mg/dL 2.0 0.0 - 2.0 mg/dL    Nitrite Urine Negative NEG^Negative    Leukocyte Esterase Urine Negative NEG^Negative    Source Catheterized Urine     WBC Urine 1 0 - 5 /HPF    RBC Urine 0 0 - 2  /HPF    Amorphous Crystals Few (A) NEG^Negative /HPF   EKG 12-lead, tracing only   Result Value Ref Range    Interpretation ECG Click View Image link to view waveform and result        Treatments provided: IV antibiotics, IV fluids, solucortef, tylenol    Family Comments: Sister, Stephania Babin was here earlier and gave us her phone #[phone]    OBS brochure/video discussed/provided to patient/family: N/A              Name of person given brochure if not patient: na              Relationship to patient: na    ED Medications:   Medications   lactated ringers BOLUS 1,000 mL (0 mLs Intravenous Stopped 5/6/19 2359)     Followed by   lactated ringers infusion (1,000 mLs Intravenous New Bag 5/7/19 0005)   piperacillin-tazobactam (ZOSYN) 4.5 g vial to attach to  mL bag (has no administration in time range)   acetaminophen (TYLENOL) solution 650 mg (has no administration in time range)   hydrocortisone sodium succinate PF (solu-CORTEF) injection 50 mg (has no administration in time range)   iopamidol (ISOVUE-370) solution 80 mL (80 mLs Intravenous Given 5/6/19 2348)   Saline flush (64 mLs Intravenous Given 5/6/19 2348)       Drips infusing?:  No    For the majority of the shift this patient was Green.   Interventions performed were routine cares and interventions.    Severe Sepsis OR Septic Shock Diagnosis Present: No    To be done/followed up on inpatient unit:  monitor    ED NURSE PHONE NUMBER: *[phone]

## 2019-05-07 NOTE — PROGRESS NOTES
[facility] Nurse Inpatient Skin Assessment     Initial Assessment of:   Coccyx         Data:   Patient History:      per MD note(s):  Mr. Keyon Farias is a 58-year-old male with a traumatic brain injury with resultant aphasia, spastic paraplegia, seizure, panhypopituitarism, GERD, and recurrent aspiration pneumonias, who was just in hospital from 04/03-04/08/2019 and presents back with fever with concerns for aspiration pneumonia again.  This will be his 8th admission for similar presentation.     Ricci Risk Assessment  Sensory Perception: 2-->very limited    Moisture: 3-->occasionally moist   Activity: 1-->bedfast     Mobility: 2-->very limited   Nutrition: 2-->probably inadequate   Ricci Score: 12    Positioning: Pillows    Mattress:  Pulsate air mattress    Moisture Management:  Incontinence Protocol and Diaper    Catheter secured? Not applicable    Current Diet / Nutrition:       Orders Placed This Encounter        NPO for Medical/Clinical Reasons Except for: No Exceptions    Labs:   Recent Labs   Lab Test 05/07/19  0747 05/06/19  2257  01/31/19  1324  11/22/18  1438  02/07/17  0452   ALBUMIN  --  3.0*   < > 2.9*   < >  --    < >  --    HGB 12.4* 13.1*   < > 12.0*   < >  --    < > 9.4*   INR  --   --   --   --   --   --   --  1.27*   WBC 9.9 14.7*   < > 13.6*   < >  --    < > 11.7*   A1C  --   --   --   --   --  6.3*   < >  --    CRP  --   --   --  71.1*  --   --   --   --     < > = values in this interval not displayed.         Skin Assessment (location):   Coccyx area  History:  Pt has chronic issues with moisture and pressure; pt can roll well with assist but often wiggles back onto his back.  At recent admission a couple months ago pt had some superficial pressure injuries to area which have since resolved.  Pt usually has more of a fungal rash and moisture breakdown rather than defined pressure ulcers.      Skin: approx 6 x8cm area that is deep pink but blanchable throughout, intact  except for a central tiny white flaking scab that is still slightly adherent; peely epidermis    Groin folds have solid light pink rashy areas, slightly moist, no defined wounds noted    Drainage:  n/a    Odor: n/a    Pain: grimaces with palpation    5-7-19 coccyx      3-9-19                Intervention:     Patient's chart evaluated.      Assessed skin, cares performed    Orders  Written    Supplies  reviewed; RN will order antifungal  powder    Discussed plan of care with Patient and Nurse        Assessment:        Coccyx with mild rash and healing denudement; skin dry, intact, and blanchable.  At risk for further breakdown due to limited mobility and frequent moisture.  Pulsate air mattress in place.       Groin folds with fungal-looking rash, will start antifungal powder.            Plan:   Nursing to notify the Provider(s) and re-consult the [facility] Nurse if skin deteriorate(s).      Skin care plan to groin, coccyx: BID and prn:  1.  Cleanse with mild skin cleanser.   2.  Apply antifungal powder to groin folds and gluteal cleft/coccyx area, rub in well.    3.  Recommend leaving coccyx open to air, since dressings will likely trap moisture.    4.  Ensure briefs are positioned all the way up into the groin folds, to prevent chafing/moisture to groin/inner thighs.  5.  PIP measures.  Reposition every 1-2 hrs, side to side.  HOB as low as tolerated.  Pulsate air mattress.     [facility] Nurse will return: weekly and prn

## 2019-05-07 NOTE — PHARMACY-ADMISSION MEDICATION HISTORY
Admission medication history interview status for the 5/6/2019  admission is complete. See EPIC admission navigator for prior to admission medications     Medication history source reliability:Moderate    Actions taken by pharmacist (provider contacted, etc):interviewed mother, guardian. She does not have the list but nothing has changed since his recent discharge with the exception of finishing his augmentin. Went over each med with his mother.      Additional medication history information not noted on PTA med list :None    Medication reconciliation/reorder completed by provider prior to medication history? Yes    Time spent in this activity: 25 minutes    Prior to Admission medications    Medication Sig Last Dose Taking? Auth Provider   acetaminophen (TYLENOL) 500 MG tablet 1,000 mg by Oral or FT or NG tube route every 6 hours as needed for mild pain  Yes Unknown, Entered By History   acetylcysteine (MUCOMYST) 20 % neb solution Take 2 mLs by nebulization 4 times daily  Patient taking differently: Take 2 mLs by nebulization 4 times daily With Albuterol at 07:00, 11:00, 15:00, 19:00  Yes Starr Champion MD   albuterol (PROVENTIL) (5 MG/ML) 0.5% neb solution Take 2.5 mg by nebulization 4 times daily 0700 1100 1500 1900 with mucomyst  Yes Unknown, Entered By History   bacitracin ointment Apply topically daily as needed for wound care To PEG site.   Yes Unknown, Entered By History   Brivaracetam (BRIVIACT) 10 MG/ML solution 100 mg by Oral or FT or NG tube route 2 times daily @0900 and 2100  Yes Reported, Patient   calcium carbonate 1250 (500 CA) MG/5ML SUSP suspension 5 mLs (1,250 mg) by Per J Tube route 3 times daily (with meals)  Yes Mariana Venegas MD   carBAMazepine (TEGRETOL) 100 MG/5ML suspension Take 150 mg by mouth every 6 hours At 06:00, 12:00, 18:00 and 24:00 for seizures  Yes Unknown, Entered By History   ferrous sulfate 220 (44 Fe) MG/5ML ELIX 220 mg by Per Feeding Tube route daily  Yes Unknown,  Entered By History   glycopyrrolate (GLYCATE) 2 MG tablet Take 1-2 mg by mouth 2 times daily as needed (secretions) 1/2 to 1 tablet (1 - 2 mg) up to twice daily if needed for secretions.  Yes Unknown, Entered By History   hydrocortisone (CORTEF) 5 MG tablet 10 mg by Oral or FT or NG tube route daily (with dinner) At 1500  Yes Unknown, Entered By History   hydrocortisone (CORTEF) 5 MG tablet 20 mg by Oral or FT or NG tube route every morning   Yes Unknown, Entered By History   levothyroxine (SYNTHROID/LEVOTHROID) 137 MCG tablet 137 mcg by Oral or FT or NG tube route daily @ 05:00  Yes Unknown, Entered By History   melatonin (MELATONIN) 1 MG/ML LIQD liquid 6 mg by Per NG tube route At Bedtime   Yes Unknown, Entered By History   Multiple Vitamins-Minerals (CENTRUM SILVER) per tablet Take 1 tablet by mouth daily Crush and feed via j-tube  Yes Unknown, Entered By History   pantoprazole (PROTONIX) 2 mg/mL SUSP suspension 20 mLs (40 mg) by Per J Tube route daily  Yes Washington Connors MD   potassium & sodium phosphates (NEUTRA-PHOS) 280-160-250 MG Packet Take 1 packet by mouth 3 times daily 0900, 1500, 2100.   Yes Unknown, Entered By History   testosterone cypionate (DEPOTESTOTERONE CYPIONATE) 200 MG/ML injection Inject 76 mg into the muscle See Admin Instructions Every 2 weeks on Fridays   76 mg or 0.38 mL ~ one week ago at Unknown time Yes Unknown, Entered By History   Vitamin D, Cholecalciferol, 1000 units TABS Take 2,000 Units by mouth every morning Crush and feed via j-tube  Yes Unknown, Entered By History   Guar Gum (FIBER MODULAR, NUTRISOURCE FIBER,) packet 1 packet by Per G Tube route daily   Starr Champion MD   order for DME Equipment being ordered: Nebulizer   Starr Champion MD

## 2019-05-07 NOTE — PROGRESS NOTES
Pt seen and examined. Seen by my colleague earlier in the morning. H/P, labs, vital signs and orders reviewed. Agree with his A/P.  Patient with a history of traumatic brain injury recurrent aspiration pneumonia presenting with recurrent fever after vomiting few times noted by home health nurse.  On Exam  Nonverbal, does not follow command, awake and alert though.  Extremity contractures present.  Regular rate and rhythm no rub murmur gallop  Abdomen soft G-tube in place    Continue IV Zosyn.  Follow cultures.  Reassess in AM

## 2019-05-07 NOTE — ED PROVIDER NOTES
History     Chief Complaint:  Fever    HPI   HPI limited secondary to patient's history of TBI. HPI provided through patient's sister.     Keyon Farias is a 58 year old male, with a history of TBI, seizures, septic shock, aspiration pneumonia, s/p tracheostomy, and s/p G-tube, who presents with his sister to the ED for evaluation of fever. The patient's sister reports he takes medication for his history of aspiration pneumonia. He has not taken his medication for a couple days due to their mother, who is his primary caretaker, being in the hospital for knee surgery. The home health nurse came today and noted he vomited a few times. He developed a fever this evening.       Allergies:  Dilantin  Valproic acid: toxicity c bone marrow suspension, elevated ammonia levels     Medications:    Briviact  Calcium  Tegretol  Iron  Glycate  Cortef  Levothyroxine   Melatonin  Centrum  Protonix  Potassium   Depotestoterone  Vitamin D    Past Medical History:    TBI, aphasia   DVT  GERD  Panhypopituitarism   Aspiration pneumonia  Seizures   Septic shock  Spastic hemiplegia, dominant side   Cerebral occlusion w/ infarction  UTI   V-fib     Past Surgical History:    Tracheostomy x2   Facial reconstructive surgery  Gastrotomy tube insertion   Gastrojejunostomy tube change x3  Appendectomy   Right hand repair  Vascular surgery    Family History:    History reviewed. No pertinent family history.     Social History:  Smoking status: Former smoker, Quit 1989  Alcohol use: No  Marital Status:  Single [1]     Review of Systems   Unable to perform ROS: Other     Physical Exam     Patient Vitals for the past 24 hrs:   BP Temp Temp src Pulse Resp SpO2   05/07/19 0145 -- -- -- -- -- 95 %   05/07/19 0130 (!) 127/99 -- -- 97 -- --   05/07/19 0100 115/67 -- -- 95 -- --   05/07/19 0030 106/61 -- -- 95 -- --   05/06/19 2330 130/73 -- -- 97 -- 100 %   05/06/19 2315 -- -- -- -- -- 97 %   05/06/19 2300 103/83 100.6  F (38.1  C) Axillary 114 -- 94 %    05/06/19 2230 104/59 -- -- 111 -- 96 %   05/06/19 2225 109/72 98.5  F (36.9  C) Temporal 115 16 94 %     Physical Exam  General: Well-nourished and well-groomed, smiles and responds by nodding and vocalizing but no speech, warm to touch  Eyes: PERRL, conjunctivae pink no scleral icterus or conjunctival injection  ENT:  Moist mucus membranes, posterior oropharynx clear without erythema or exudates  Respiratory:  Diminished throughout.  No wheezing or respiratory distress.  Occasional cough  CV: Mildly tachycardic rate and regular rhythm, no murmurs/rubs/gallops  GI:  Abdomen soft and protuberant. Normoactive BS.  No apparent tenderness, guarding or rebound.  G tube in place.   Skin: Warm, dry.  No rashes or petechiae.    Neuro: Unable to assess due to history of TBI  Psychiatric: Unable to assess due to history of TBI  Emergency Department Course     ECG (23:30:11):  Rate 106 bpm. NC interval 148. QRS duration 98. QT/QTc 358/475. P-R-T axes 64 -46 68. Sinus tachycardia. Left anterior fascicular block. Abnormal ECG. No significant change compared to EKG dated 4/22/19. Interpreted at 2336 by Germaine Green MD.    Imaging:  Radiographic findings were communicated with the Admitting MD who voiced understanding of the findings.    CT Chest/Abdomen/Pelvis w Contrast  IMPRESSION:  1. Right lower lobe pneumonia.  2. 3.9 cm cyst in the tail of the pancreas, increased in size since  5/16/2018. Because of the size and interval growth, biopsy or surgical  consultation should be considered.  3. Large amount of stool in the rectosigmoid colon. No bowel  obstruction.  As read by Radiology.     Laboratory:  Lactic acid (2313): 1.8    Lipase: 489(H)    CBC: WBC 14.7(H), HGB 13.1(L), o/w WNL ()  CMP: Calcium 8.4(L), Albumin 3.0(L), o/w WNL (Creatinine 0.68)     Blood cultures x2: In process     UA: pH 8.5(H), Protein albumin 10, Amorphous crystals few, o/w Negative     Urine culture: In process     Interventions:  2302: Lactated  ringers 1L Bolus IV   0005: Lactated ringers 1L Infusion IV   0149: Zosyn 4.5g IV  0142: Solu-Cortef 50mg IV  0151: Tylenol 650mg PO    Emergency Department Course:  Past medical records, nursing notes, and vitals reviewed.  2241: I performed an exam of the patient and obtained history, as documented above.    IV inserted and blood drawn.    The patient was sent for a chest/abdomen/pelvis CT while in the emergency department, findings above.     0101: I spoke to Dr. Torres of the hospitalist service who accepts the patient for admission.     0118: I rechecked the patient.     Discussed the patient with Dr. Torres, who will admit the patient to a [facility] bed for further monitoring, evaluation, and treatment.     Impression & Plan      Medical Decision Making:  Keyon Farias is a 58 year old male with a history of aspiration pneumonia who comes today with a fever, cough and report of vomiting which is very concerning for recurrent aspiration pneumonia.  Chest x-ray demonstrates a new right lower lobe infiltrate.  I suspect this is aspiration pneumonia.  He was treated with Zosyn for this.  His urinalysis looks clear and I do not believe he has a urinary tract infection.  Blood cultures are pending.  He has no signs of severe sepsis or septic shock.  However, given his underlying medical conditions we will admit him to the hospital for further IV antibiotics, treatment and monitoring.    Of note, the patient's mother is his power of  and guardian.  However she recently had surgery and is hospitalized and is unable to be in the hospital or make decisions at this time due to pain medications.  His sister, Stephania Babin, did come to the hospital and offered to answer any questions or provide any assistance.  Her phone number is [phone].    Diagnosis:    ICD-10-CM   1. Pneumonia of right lower lobe due to infectious organism (H) J18.1   2. Vomiting, intractability of vomiting not specified, presence of nausea not  specified, unspecified vomiting type R11.10     Disposition: Patient admitted to a med bed by Dr. Melissa Cui  5/6/2019    EMERGENCY DEPARTMENT    Scribe Disclosure:  I, Courtney Cui, am serving as a scribe at 10:41 PM on 5/6/2019 to document services personally performed by Germaine Green MD based on my observations and the provider's statements to me.        Germaine Green MD  05/07/19 0522

## 2019-05-07 NOTE — PROGRESS NOTES
RECEIVING UNIT ED HANDOFF REVIEW    ED Nurse Handoff Report was reviewed by: Rekha Peacock on May 7, 2019 at 1:40 AM

## 2019-05-07 NOTE — CONSULTS
CLINICAL NUTRITION SERVICES  -  ASSESSMENT NOTE      Recommendations Ordered by Registered Dietitian (RD):     Nutrition Support Enteral:  Type of Feeding Tube: 18Fr TORY GJ tube  Enteral Frequency:  12 hr daytime cycle  Enteral Regimen: Isosource 1.5 (substitution for Jevity 1.5) at 100 mL/hr x 12 hrs (7am-7pm)  Total Enteral Provisions: 1800 cals (25 cals/kg), 82 gm pro (1.1 gm/kg), 18 gm fiber, 912 mL free water, 100% RDI  Free Water Flush: standard flushes of 60 mL every 4 hrs (while on IVF)  1 packet Nutrisource Fiber daily = 15 cals, 3 gm fiber  TOTAL: 1815 cals (25 cals/kg), 21 gm fiber     Malnutrition:   % Weight Loss:  Unable to assess as not wt over the past month  % Intake:  No decreased intake noted  Subcutaneous Fat Loss:  None observed  Muscle Loss:  Pt is thin, but no acute loss  Fluid Retention:  None noted    Malnutrition Diagnosis: Patient does not meet two of the above criteria necessary for diagnosing malnutrition      NUTRITION DIAGNOSIS:  Inadequate protein-energy intake related to TF has been on hold as evidenced by pt meeting 0% est needs         REASON FOR ASSESSMENT  Keyon Farias is a 58 year old male seen by Registered Dietitian for Provider Order - Registered Dietitian to Assess and Order TF per Medical Nutrition Therapy Protocol      NUTRITION HISTORY  Pt well known to this service  Information obtained from EMR:  He has been on TF via GJ tube since August 2016; last tube was placed 3/8/19 - 18Fr TORY GJ tube.  Per previous records pt's home TF regimen is as follows - Jevity 1.5 (5 to 5.5 cans daily) x 12 hours during the day to provide 4637-9267 kcal, 77-83g protein, ~260g CHO, ~27g fiber and ~900mL free water. H2O flushes 120 mL QID.  Pt lives with his mother, she likes to keep the TF rate at no more than 100 ml/hr. He is fed during the day rather than at night so that he can be up in the chair to prevent aspiration.     Pt receiving daily multivitamin (Centrum Silver), Neutra-Phos,  "Nutrisource Fiber (1 packet daily)    Tube Feeding and supplies through [facility] phone [phone]     Stopped in to see pt this morning  He gave me a thumbs up to confirming TF as above and that he has been tolerating it well      CURRENT NUTRITION ORDERS  Diet Order:     NPO     Plan to resume TF today      NUTRITION FOCUSED PHYSICAL ASSESSMENT (NFPA)  Completed:  Yes Visual assessment only         Observed:    Pt is thin, but no acute changes    Obtained from Chart/Interdisciplinary Team:  No edema      ANTHROPOMETRICS  Height: 6'0\"  Weight: no wt this admit  IBW: 80.9 kg  Weight History:   Wt Readings from Last 10 Encounters:   04/08/19 71.8 kg (158 lb 6.4 oz)   04/02/19 72 kg (158 lb 11.7 oz)   03/18/19 70.9 kg (156 lb 4.9 oz)   03/11/19 77.6 kg (171 lb)   03/04/19 72.6 kg (160 lb)   02/22/19 74.4 kg (164 lb 0.4 oz)   02/01/19 71.5 kg (157 lb 11.2 oz)   01/16/19 71.6 kg (157 lb 13.6 oz)   12/24/18 62 kg (136 lb 11 oz)   11/25/18 71.7 kg (158 lb 1.1 oz)         LABS  Labs reviewed    MEDICATIONS  Nutrisource Fiber - 1 packet per day  IVF (NaCl) at 100 mL/hr      ASSESSED NUTRITION NEEDS PER APPROVED PRACTICE GUIDELINES:    Dosing Weight (4/8) 71.8 kg  Estimated Energy Needs: 7784-6434 kcals (25-30 Kcal/Kg)  Justification: maintenance  Estimated Protein Needs: 70-95 grams protein (1-1.3 g pro/Kg)  Justification: preservation of lean body mass  Estimated Fluid Needs: 1  ML/kcals  Justification: maintenance    MALNUTRITION:  % Weight Loss:  Unable to assess as not wt over the past month  % Intake:  No decreased intake noted  Subcutaneous Fat Loss:  None observed  Muscle Loss:  Pt is thin, but no acute loss  Fluid Retention:  None noted    Malnutrition Diagnosis: Patient does not meet two of the above criteria necessary for diagnosing malnutrition      NUTRITION DIAGNOSIS:  Inadequate protein-energy intake related to TF has been on hold as evidenced by pt meeting 0% est needs      NUTRITION " INTERVENTIONS  Recommendations / Nutrition Prescription  NPO    Nutrition Support Enteral:  Type of Feeding Tube: 18Fr TORY GJ tube  Enteral Frequency:  12 hr daytime cycle  Enteral Regimen: Isosource 1.5 (substitution for Jevity 1.5) at 100 mL/hr x 12 hrs (7am-7pm)  Total Enteral Provisions: 1800 cals (25 cals/kg), 82 gm pro (1.1 gm/kg), 18 gm fiber, 912 mL free water, 100% RDI  Free Water Flush: standard flushes of 60 mL every 4 hrs (while on IVF)  1 packet Nutrisource Fiber daily = 15 cals, 3 gm fiber  TOTAL: 1815 cals (25 cals/kg), 21 gm fiber    .      Implementation  Nutrition education ---> explained to pt that we will be restarting his TF today  EN Composition and EN Schedule ---> TF orders entered in EPIC      Nutrition Goals  EN to meet % est needs  .      MONITORING AND EVALUATION:  Progress towards goals will be monitored and evaluated per protocol and Practice Guidelines

## 2019-05-07 NOTE — H&P
[facility]    History and Physical - Hospitalist Service       Date of Admission:  5/6/2019    Assessment & Plan      Mr. Keyon Farias is a 58-year-old male with a traumatic brain injury with resultant aphasia, spastic paraplegia, seizure, panhypopituitarism, GERD, and recurrent aspiration pneumonias, who was just in hospital from 04/03-04/08/2019 and presents back with fever with concerns for aspiration pneumonia again.  This will be his 8th admission for similar presentation.     1.  Recurrent aspiration pneumonia        Fever  Assessment:  He has had frequent recurrence events despite G-tube feedings and aspiration precautions.  Historically, he recovers quite quickly from febrile events following aspiration, potentially suggesting pneumonitis rather than pneumonia.  He has been evaluated by Infectious Disease on several occasions. CT chest on admission shows right lower lobe pneumonia.  Plan:  - Admit him as inpatient.     - Continue IV Zosyn.   - We will monitor fever curve  - We will have aspiration precautions, n.p.o. including medications,   - hold TF for now  - 30  to 45  HOB elevation  - GJ-tube used for medications.   -  We will resume his PTA albuterol nebs, Mucomyst nebs.      2.  Nutrition:    Assessment: at baseline he is strict N.p.o., including for medications and G-tube feeding.    Plan:  -We will have Nutrition consult for initiation of tube feeding.    - We will have Protonix.    - We will also start him on normal saline at 75 mL per hour for now until afebrile for 24 hours, then possibly resume TF     3.  Traumatic brain injury with sequelae of aphasia and spastic paralysis.   Assessment/Plan: He gets home care services. Will consult  for disposition planning.      4.  Seizure disorder.  Resume prior to admission, Tegretol and levetiracetam when verified by Pharmacy via his G tube    5.  Panhypopituitarism.  Resume PTA hydrocortisone 20 mg in morning and 10 mg nightly  after additional two doses of stress dose steroids    6.  Hypothyroidism.  Continue PTA Synthroid via G tube    7.  Hypogonadotropic hypogonadism.  Resume PTA Testosterone injection in the outpatient setting.     8. Pancreatic Cyst. CT showed a  3.9 cm cyst in the tail of the pancreas, increased in size since 5/16/2018. Will need to discuss with guardian  That given interval growth, biopsy or surgical consultation should be considered, though feel this could be worked up as outpatient?.       Diet: NPO  DVT Prophylaxis: Enoxaparin (Lovenox) SQ  Lerma Catheter: not present  Code Status: FULL CODE    Disposition Plan   Expected discharge: 2 - 3 days, recommended to prior living arrangement once antibiotic plan established.  Entered: Ricky Torres MD 05/07/2019, 1:59 AM     The patient's care was discussed with the Bedside Nurse and ED provider.    Ricky Torres MD  [facility]    ______________________________________________________________________    Chief Complaint     Fever    History is obtained from the electronic health record and emergency department physician    History of Present Illness      Mr. Keyon Farias is a 58-year-old male with a traumatic brain injury with resultant aphasia, spastic paraplegia, seizure, panhypopituitarism, GERD, and recurrent aspiration pneumonias, who was just in hospital from 04/03-04/08/2019 and presents back with fever with concerns for aspiration pneumonia again.     HPI is limited secondary to patient's history of traumatic brain injury.  HPI was obtained through E HR, emergency room medicine physician.    Patient's primary caretaker his mother, is currently being hospitalized at [facility] following knee surgery.  Patient at baseline receives home health, and when his nurse came to evaluate him today it was noted that he vomited a few times.  And later in the evening he developed a fever.  Thus he was brought to emergency department for further  evaluation of possible aspiration pneumonia, for which she has had multiple episodes of in the past.  Patient at baseline is nonverbal, unable to endorse any complaints.  He currently has a GJ-tube for both tube feedings and all medications.    Review of Systems    The 10 point Review of Systems is negative other than noted in the HPI or here.     Past Medical History    I have reviewed this patient's medical history and updated it with pertinent information if needed.   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  Past Surgical History:   Procedure Laterality Date     ENDOSCOPIC ULTRASOUND UPPER GASTROINTESTINAL TRACT (GI) N/A 1/30/2017    Procedure: ENDOSCOPIC ULTRASOUND, ESOPHAGOSCOPY / UPPER GASTROINTESTINAL TRACT (GI);  Surgeon: Jus Montana MD;  Location: [facility] OR     ENDOSCOPIC ULTRASOUND, ESOPHAGOSCOPY, GASTROSCOPY, DUODENOSCOPY (EGD), NECROSECTOMY N/A 2/7/2017    Procedure: ENDOSCOPIC ULTRASOUND, ESOPHAGOSCOPY, GASTROSCOPY, DUODENOSCOPY (EGD), NECROSECTOMY;  Surgeon: Jack Marcus MD;  Location:  [facility] OR     ESOPHAGOSCOPY, GASTROSCOPY, DUODENOSCOPY (EGD), COMBINED  3/13/2014    Procedure: COMBINED ESOPHAGOSCOPY, GASTROSCOPY, DUODENOSCOPY (EGD), BIOPSY SINGLE OR MULTIPLE;  gastroscopy;  Surgeon: Digna Rhodes MD;  Location:  GI     ESOPHAGOSCOPY, GASTROSCOPY, DUODENOSCOPY (EGD), COMBINED N/A 2016    Procedure: COMBINED ESOPHAGOSCOPY, GASTROSCOPY, DUODENOSCOPY (EGD);  Surgeon: Digna Rhodes MD;  Location:  GI     ESOPHAGOSCOPY, GASTROSCOPY, DUODENOSCOPY (EGD), COMBINED N/A 2017    Procedure: COMBINED ENDOSCOPIC ULTRASOUND, ESOPHAGOSCOPY, GASTROSCOPY, DUODENOSCOPY (EGD), FINE NEEDLE ASPIRATE/BIOPSY;  Surgeon: Too Thakur MD;  Location:  OR     HEAD & NECK SURGERY      reconstructive facial surgery following accident in      IR FOLLOW UP VISIT INPATIENT  2019     IR GASTRO JEJUNOSTOMY TUBE CHANGE  2018     IR GASTRO JEJUNOSTOMY TUBE CHANGE  2019     IR GASTRO JEJUNOSTOMY TUBE CHANGE  3/8/2019     LAPAROSCOPIC APPENDECTOMY  2013    Procedure: LAPAROSCOPIC APPENDECTOMY;  LAPAROSCOPIC APPENDECTOMY;  Surgeon: Manish Pierce MD;  Location:  OR     LAPAROSCOPIC ASSISTED INSERTION TUBE GASTROTOMY N/A 2016    Procedure: LAPAROSCOPIC ASSISTED INSERTION TUBE GASTROSTOMY;  Surgeon: Manish Pierce MD;  Location:  OR     ORTHOPEDIC SURGERY      right hand repair     TRACHEOSTOMY N/A 9/3/2016    Procedure: TRACHEOSTOMY;  Surgeon: João Ortiz MD;  Location:  OR     TRACHEOSTOMY N/A 2016    Procedure: TRACHEOSTOMY;  Surgeon: João Ortiz MD;  Location:  OR     VASCULAR SURGERY         Social History   I have reviewed this patient's social history and updated it with pertinent information if needed.  Social History     Tobacco Use     Smoking status: Former Smoker     Last attempt to quit: 1989     Years since quittin.0     Smokeless tobacco: Never Used   Substance Use Topics     Alcohol use: No      Drug use: No       Family History      I have reviewed this patient's family history and updated it with pertinent information if needed.   Family History   Problem Relation Age of Onset     Cancer Father      Prior to Admission Medications   Prior to Admission Medications   Prescriptions Last Dose Informant Patient Reported? Taking?   Brivaracetam (BRIVIACT) 10 MG/ML solution  Mother Yes No   Si mg by Oral or FT or NG tube route 2 times daily @0900 and 2100   Guar Gum (FIBER MODULAR, NUTRISOURCE FIBER,) packet  Mother No No   Si packet by Per G Tube route daily   Multiple Vitamins-Minerals (CENTRUM SILVER) per tablet  Mother Yes No   Sig: Take 1 tablet by mouth daily Crush and feed via j-tube   Vitamin D, Cholecalciferol, 1000 units TABS  Mother Yes No   Sig: Take 2,000 Units by mouth every morning Crush and feed via j-tube   acetaminophen (TYLENOL) 500 MG tablet  Mother Yes No   Si,000 mg by Oral or FT or NG tube route every 6 hours as needed for mild pain   acetylcysteine (MUCOMYST) 20 % neb solution  Mother No No   Sig: Take 2 mLs by nebulization 4 times daily   Patient taking differently: Take 2 mLs by nebulization 4 times daily With Albuterol at 07:00, 11:00, 15:00, 19:00   albuterol (PROVENTIL) (5 MG/ML) 0.5% neb solution  Mother No No   Sig: Take 0.5 mLs (2.5 mg) by nebulization every 4 hours (while awake)   Patient taking differently: Take 2.5 mg by nebulization 4 times daily With Mucomyst at 07:00, 11:00, 15:00, 19:00.   amoxicillin-clavulanate (AUGMENTIN) 875-125 MG tablet  Mother No No   Sig: Take 1 tablet by mouth every 12 hours   bacitracin ointment  Mother Yes No   Sig: Apply topically daily as needed for wound care To PEG site.    calcium carbonate 1250 (500 CA) MG/5ML SUSP suspension  Mother No No   Si mLs (1,250 mg) by Per J Tube route 3 times daily (with meals)   carBAMazepine (TEGRETOL) 100 MG/5ML suspension  Mother Yes No   Sig: Take 150 mg by mouth every 6 hours At 06:00,  12:00, 18:00 and 24:00 for seizures   ferrous sulfate 220 (44 Fe) MG/5ML ELIX  Mother Yes No   Si mg by Per Feeding Tube route daily   glycopyrrolate (GLYCATE) 2 MG tablet  Mother Yes No   Sig: Take 1-2 mg by mouth 2 times daily as needed (secretions) 1/2 to 1 tablet (1 - 2 mg) up to twice daily if needed for secretions.   hydrocortisone (CORTEF) 5 MG tablet  Mother Yes No   Sig: 10 mg by Oral or FT or NG tube route daily (with dinner) At 1500   hydrocortisone (CORTEF) 5 MG tablet  Mother Yes No   Si mg by Oral or FT or NG tube route every morning    levothyroxine (SYNTHROID/LEVOTHROID) 137 MCG tablet  Mother Yes No   Si mcg by Oral or FT or NG tube route daily @ 05:00   melatonin (MELATONIN) 1 MG/ML LIQD liquid  Mother Yes No   Si mg by Per NG tube route At Bedtime    order for DME  Mother No No   Sig: Equipment being ordered: Nebulizer   pantoprazole (PROTONIX) 2 mg/mL SUSP suspension  Mother No No   Si mLs (40 mg) by Per J Tube route daily   potassium & sodium phosphates (NEUTRA-PHOS) 280-160-250 MG Packet  Mother Yes No   Sig: Take 1 packet by mouth 3 times daily 0900, 1500, 2100.    testosterone cypionate (DEPOTESTOTERONE CYPIONATE) 200 MG/ML injection  Mother Yes No   Sig: Inject 76 mg into the muscle See Admin Instructions Every 2 weeks on    76 mg or 0.38 mL      Facility-Administered Medications: None     Allergies   Allergies   Allergen Reactions     Dilantin [Phenytoin Sodium]      Valproic Acid      Toxicity c bone marrow suspension, elevated ammonia levels        Physical Exam   Vital Signs: Temp: 100.6  F (38.1  C) Temp src: Axillary BP: (!) 127/99 Pulse: 97   Resp: 16 SpO2: 95 % O2 Device: None (Room air)    Weight: 0 lbs 0 oz    GENERAL:  The patient is conscious, alert, awake, mostly nonverbal but follows simple commands and interacts minimally with yes/no of mouthing.  He does use thumbs up for affirmative answers.   HEENT:  Pupils are equal and reactive to light  and accommodation.  Extraocular movements are intact.  Oral mucosa is a dry.   NECK:  Supple, no raised JVD.   RESPIRATORY: Crackles at right base , no significant wheezing.   CARDIOVASCULAR:  Normal S1-S2, regular rate and rhythm, no murmur.   ABDOMEN:  Soft, nontender, nondistended.  A GJ tube in place.  No guarding, rigidity, or rebound tenderness.  Bowel sounds are present.   LOWER EXTREMITIES:  With no edema.   NEUROLOGIC:  He has spastic hemiplegia on the right, mostly nonverbal.   PSYCHIATRY:  Normal mood.      Data   Data reviewed today: I reviewed all medications, new labs and imaging results over the last 24 hours. I personally reviewed the chest CT image(s) showing see below.    CT CHEST  IMPRESSION:  1. Right lower lobe pneumonia.  2. 3.9 cm cyst in the tail of the pancreas, increased in size since  5/16/2018. Because of the size and interval growth, biopsy or surgical  consultation should be considered.  3. Large amount of stool in the rectosigmoid colon. No bowel  obstruction.    Most Recent 3 CBC's:  Recent Labs   Lab Test 05/06/19 2257 04/22/19 2347 04/06/19  0903 04/05/19  0751   WBC 14.7* 11.2*  --  6.0   HGB 13.1* 12.6*  --  11.4*   MCV 83 83  --  83    215 251 204     Most Recent 3 BMP's:  Recent Labs   Lab Test 05/06/19 2257 04/22/19 2347 04/08/19  0846    139 139   POTASSIUM 4.3 4.1 3.5   CHLORIDE 101 105 103   CO2 28 27 26   BUN 13 13 13   CR 0.68 0.80 0.74   ANIONGAP 6 7 10   STEVE 8.4* 8.5 9.2   GLC 98 125* 160*     Most Recent 2 LFT's:  Recent Labs   Lab Test 05/06/19 2257 04/22/19 2347   AST 28 26   ALT 26 27   ALKPHOS 112 98   BILITOTAL 0.2 0.1*     Most Recent 3 INR's:  Recent Labs   Lab Test 02/07/17  0452 01/30/17  0340 01/25/17  0426   INR 1.27* 1.32* 1.49*     Recent Results (from the past 24 hour(s))   CT Chest/Abdomen/Pelvis w Contrast    Narrative    CT CHEST/ABDOMEN/PELVIS W CONTRAST  5/6/2019 11:51 PM    HISTORY: Sepsis. Cough. History of aspiration,  vomiting.    TECHNIQUE: CT scan obtained of the chest, abdomen, and pelvis with  oral and IV contrast. 80 mL Isovue-370 injected. Radiation dose for  this scan was reduced using automated exposure control, adjustment of  the mA and/or kV according to patient size, or iterative  reconstruction technique.    COMPARISON: 12/18/2018. 5/16/2018.    FINDINGS:  Chest: Breathing motion artifact degrades images throughout the study.  There is a right lower lobe infiltrate consistent with pneumonia. Mild  dependent atelectasis bilaterally. No pneumothorax or pleural  effusion. There is a mildly enlarged subcarinal lymph node similar to  the previous exam. No hilar or axillary lymph node enlargement. The  heart size is normal.    Abdomen: There is a small cyst in the left lobe of the liver. The  spleen, gallbladder, adrenal glands and kidneys are normal in  appearance. There is a cyst in the pancreas tail measuring  approximately 3.9 x 3.1 cm, increased in size from 5/16/2018. There is  no abdominal or pelvic lymph node enlargement.    Pelvis: There is a large amount of stool in the rectosigmoid colon. No  bowel obstruction or inflammation. There is a GJ tube in place. No  free intraperitoneal gas or fluid.      Impression    IMPRESSION:  1. Right lower lobe pneumonia.  2. 3.9 cm cyst in the tail of the pancreas, increased in size since  5/16/2018. Because of the size and interval growth, biopsy or surgical  consultation should be considered.  3. Large amount of stool in the rectosigmoid colon. No bowel  obstruction.

## 2019-05-07 NOTE — PLAN OF CARE
Problem: Respiratory Compromise (Pneumonia)  Goal: Effective Oxygenation and Ventilation  5/7/2019 1424 by Alee Levy RN  Outcome: Improving  5/7/2019 0449 by Gurpreet Xie  Outcome: No Change     Problem: Infection (Pneumonia)  Goal: Resolution of Infection Signs/Symptoms  5/7/2019 1424 by Alee Levy RN  Outcome: Improving  5/7/2019 0449 by Gurpreet Xie  Outcome: No Change   Afebrile, on room air now. WBC has normalized. Continue to monitor and treat. IV antibiotics being given. Awaiting for TF pump and supply to come.

## 2019-05-08 LAB
BACTERIA SPEC CULT: NO GROWTH
GLUCOSE BLDC GLUCOMTR-MCNC: 105 MG/DL (ref 70–99)
GLUCOSE BLDC GLUCOMTR-MCNC: 109 MG/DL (ref 70–99)
GLUCOSE BLDC GLUCOMTR-MCNC: 117 MG/DL (ref 70–99)
GLUCOSE BLDC GLUCOMTR-MCNC: 122 MG/DL (ref 70–99)
GLUCOSE BLDC GLUCOMTR-MCNC: 124 MG/DL (ref 70–99)
GLUCOSE BLDC GLUCOMTR-MCNC: 132 MG/DL (ref 70–99)
GLUCOSE BLDC GLUCOMTR-MCNC: 68 MG/DL (ref 70–99)
GLUCOSE BLDC GLUCOMTR-MCNC: 71 MG/DL (ref 70–99)
Lab: NORMAL
SPECIMEN SOURCE: NORMAL

## 2019-05-08 PROCEDURE — 40000257 ZZH STATISTIC CONSULT NO CHARGE VASC ACCESS

## 2019-05-08 PROCEDURE — 25000132 ZZH RX MED GY IP 250 OP 250 PS 637: Performed by: STUDENT IN AN ORGANIZED HEALTH CARE EDUCATION/TRAINING PROGRAM

## 2019-05-08 PROCEDURE — 25800030 ZZH RX IP 258 OP 636: Performed by: STUDENT IN AN ORGANIZED HEALTH CARE EDUCATION/TRAINING PROGRAM

## 2019-05-08 PROCEDURE — 25000125 ZZHC RX 250: Performed by: INTERNAL MEDICINE

## 2019-05-08 PROCEDURE — 12000000 ZZH R&B MED SURG/OB

## 2019-05-08 PROCEDURE — 00000146 ZZHCL STATISTIC GLUCOSE BY METER IP

## 2019-05-08 PROCEDURE — 94640 AIRWAY INHALATION TREATMENT: CPT

## 2019-05-08 PROCEDURE — 25000128 H RX IP 250 OP 636: Performed by: STUDENT IN AN ORGANIZED HEALTH CARE EDUCATION/TRAINING PROGRAM

## 2019-05-08 PROCEDURE — 94640 AIRWAY INHALATION TREATMENT: CPT | Mod: 76

## 2019-05-08 PROCEDURE — 99232 SBSQ HOSP IP/OBS MODERATE 35: CPT | Performed by: INTERNAL MEDICINE

## 2019-05-08 PROCEDURE — 40000141 ZZH STATISTIC PERIPHERAL IV START W/O US GUIDANCE

## 2019-05-08 PROCEDURE — A9270 NON-COVERED ITEM OR SERVICE: HCPCS | Performed by: STUDENT IN AN ORGANIZED HEALTH CARE EDUCATION/TRAINING PROGRAM

## 2019-05-08 PROCEDURE — A9270 NON-COVERED ITEM OR SERVICE: HCPCS | Performed by: INTERNAL MEDICINE

## 2019-05-08 PROCEDURE — 40000275 ZZH STATISTIC RCP TIME EA 10 MIN

## 2019-05-08 PROCEDURE — 25000132 ZZH RX MED GY IP 250 OP 250 PS 637: Performed by: INTERNAL MEDICINE

## 2019-05-08 RX ORDER — HYDROCORTISONE 20 MG/1
20 TABLET ORAL EVERY MORNING
Status: DISCONTINUED | OUTPATIENT
Start: 2019-05-09 | End: 2019-05-09 | Stop reason: HOSPADM

## 2019-05-08 RX ORDER — HYDROCORTISONE 10 MG/1
10 TABLET ORAL
Status: DISCONTINUED | OUTPATIENT
Start: 2019-05-08 | End: 2019-05-09 | Stop reason: HOSPADM

## 2019-05-08 RX ADMIN — CARBAMAZEPINE 150 MG: 100 SUSPENSION ORAL at 14:58

## 2019-05-08 RX ADMIN — Medication 40 MG: at 09:15

## 2019-05-08 RX ADMIN — PIPERACILLIN SODIUM,TAZOBACTAM SODIUM 3.38 G: 3; .375 INJECTION, POWDER, FOR SOLUTION INTRAVENOUS at 23:54

## 2019-05-08 RX ADMIN — SODIUM CHLORIDE: 9 INJECTION, SOLUTION INTRAVENOUS at 02:14

## 2019-05-08 RX ADMIN — HYDROCORTISONE 10 MG: 10 TABLET ORAL at 17:55

## 2019-05-08 RX ADMIN — ACETYLCYSTEINE 2 ML: 200 SOLUTION ORAL; RESPIRATORY (INHALATION) at 19:03

## 2019-05-08 RX ADMIN — CARBAMAZEPINE 150 MG: 100 SUSPENSION ORAL at 19:32

## 2019-05-08 RX ADMIN — ACETYLCYSTEINE 2 ML: 200 SOLUTION ORAL; RESPIRATORY (INHALATION) at 15:23

## 2019-05-08 RX ADMIN — INSULIN ASPART 1 UNITS: 100 INJECTION, SOLUTION INTRAVENOUS; SUBCUTANEOUS at 00:04

## 2019-05-08 RX ADMIN — BRIVARACETAM 100 MG: 10 SOLUTION ORAL at 08:36

## 2019-05-08 RX ADMIN — LEVOTHYROXINE SODIUM 137 MCG: 112 TABLET ORAL at 06:45

## 2019-05-08 RX ADMIN — BRIVARACETAM 100 MG: 10 SOLUTION ORAL at 21:13

## 2019-05-08 RX ADMIN — ENOXAPARIN SODIUM 40 MG: 40 INJECTION SUBCUTANEOUS at 02:14

## 2019-05-08 RX ADMIN — ALBUTEROL SULFATE 2.5 MG: 2.5 SOLUTION RESPIRATORY (INHALATION) at 12:06

## 2019-05-08 RX ADMIN — CARBAMAZEPINE 150 MG: 100 SUSPENSION ORAL at 02:14

## 2019-05-08 RX ADMIN — MICONAZOLE NITRATE: 2 POWDER TOPICAL at 08:42

## 2019-05-08 RX ADMIN — ALBUTEROL SULFATE 2.5 MG: 2.5 SOLUTION RESPIRATORY (INHALATION) at 08:45

## 2019-05-08 RX ADMIN — PIPERACILLIN SODIUM,TAZOBACTAM SODIUM 3.38 G: 3; .375 INJECTION, POWDER, FOR SOLUTION INTRAVENOUS at 12:05

## 2019-05-08 RX ADMIN — PIPERACILLIN SODIUM,TAZOBACTAM SODIUM 3.38 G: 3; .375 INJECTION, POWDER, FOR SOLUTION INTRAVENOUS at 02:14

## 2019-05-08 RX ADMIN — MICONAZOLE NITRATE: 2 POWDER TOPICAL at 21:14

## 2019-05-08 RX ADMIN — ALBUTEROL SULFATE 2.5 MG: 2.5 SOLUTION RESPIRATORY (INHALATION) at 19:03

## 2019-05-08 RX ADMIN — PIPERACILLIN SODIUM,TAZOBACTAM SODIUM 3.38 G: 3; .375 INJECTION, POWDER, FOR SOLUTION INTRAVENOUS at 17:55

## 2019-05-08 RX ADMIN — CARBAMAZEPINE 150 MG: 100 SUSPENSION ORAL at 08:36

## 2019-05-08 RX ADMIN — ACETYLCYSTEINE 2 ML: 200 SOLUTION ORAL; RESPIRATORY (INHALATION) at 08:46

## 2019-05-08 RX ADMIN — ALBUTEROL SULFATE 2.5 MG: 2.5 SOLUTION RESPIRATORY (INHALATION) at 22:30

## 2019-05-08 RX ADMIN — ACETYLCYSTEINE 2 ML: 200 SOLUTION ORAL; RESPIRATORY (INHALATION) at 12:06

## 2019-05-08 RX ADMIN — ALBUTEROL SULFATE 2.5 MG: 2.5 SOLUTION RESPIRATORY (INHALATION) at 15:23

## 2019-05-08 ASSESSMENT — ACTIVITIES OF DAILY LIVING (ADL)
ADLS_ACUITY_SCORE: 45

## 2019-05-08 NOTE — PLAN OF CARE
Pt nonverbal; HECTOR orientation. VSS on RA. NPO-TF from 7a-7p at 50 ml/hr with 60mL free water flushes. Rate decreased due to frequent coughing. Excoriated area on coccyx, left ANANYA per WOC nurse. Pt also has redness in groin area. Powder applied to groin and buttocks per WOC nurse. Pulsate mattress in place. Pt incontinent turn/repo q2hrs. New IV placed in L forearm - IV SL with intermittent abx. Discharge pending.

## 2019-05-08 NOTE — PLAN OF CARE
VSS. Non-verbal, understands commands. NPO, TF continuous 100mL/hr with 60mL free water flushed. Non-blanchable redness on coccyx open to air with antifungal powder per WOC order. Incontinent. Turn and repo q2h. IV Zosyn. Discharge pending.

## 2019-05-08 NOTE — PROGRESS NOTES
[facility]    Medicine Progress Note - Hospitalist Service       Date of Admission:  5/6/2019  Assessment & Plan     Mr. Keyon Farias is a 58-year-old male with a traumatic brain injury with resultant aphasia, spastic paraplegia, seizure, panhypopituitarism, GERD, and recurrent aspiration pneumonias, who was just in hospital from 04/03-04/08/2019 and presents back with fever with concerns for aspiration pneumonia again. \      Recurrent aspiration pneumonia        Fever  -  He has had frequent recurrence events despite G-tube feedings and aspiration precautions.  Historically, he recovers quite quickly from febrile events following aspiration, potentially suggesting pneumonitis rather than pneumonia.  He has been evaluated by Infectious Disease on several occasions. CT chest on admission shows right lower lobe pneumonia.  -Currently on IV Zosyn and patient has been afebrile and on room air.  -However patient still coughing quite a bit and significant residual from stomach  -We will decrease tube feeding and monitor  - Continue with aspiration precautions, n.p.o. including medications   - 30  to 45  HOB elevation  - GJ-tube used for medications.   Continue with his PTA albuterol nebs, Mucomyst nebs.      Dysphagia  -at baseline he is strict N.p.o., medications and tube feeding through G-tube feeding.    -Nutrition managing tube feeding, however given significant cough and high residuals in the stomach will decrease tube feeding to 50 mL/h.    -Continue with Protonix      Traumatic brain injury with sequelae of aphasia and spastic paralysis.   Continue with home care services at discharge     Seizure disorder.    Continue prior to admission, Tegretol and levetiracetam      Panhypopituitarism.    Resume PTA hydrocortisone 20 mg in morning and 10 mg nightly     Hypothyroidism.    Continue PTA Synthroid via G tube       Hypogonadotropic hypogonadism.    Resume PTA Testosterone injection in the outpatient  setting.       Pancreatic Cyst.   CT showed a  3.9 cm cyst in the tail of the pancreas, increased in size since 5/16/2018. Will need outpatient follow-up    Diet: NPO for Medical/Clinical Reasons Except for: No Exceptions  Adult Formula Drip Feeding: Continuous Isosource 1.5; Jejunostomy; Goal Rate: 50; mL/hr; From: 7:00 AM; 7:00 PM; Medication - Feeding Tube Flush Frequency: At least 15-30 mL water before and after medication administration and with tube clogging    DVT Prophylaxis: Pneumatic Compression Devices  Lerma Catheter: not present  Code Status: Full Code      Disposition Plan   Expected discharge: Tomorrow, recommended to prior living arrangement once Continued clinical improvement..  Entered: Pamela Soliz MD 05/08/2019, 2:31 PM       The patient's care was discussed with the Bedside Nurse, Care Coordinator/ and Patient.    Pamela Soliz MD  Hospitalist Service  [facility]    ______________________________________________________________________    Interval History   Patient has been coughing quite a bit per nursing.  Lot of residuals last night.  Keyon however feels better.  Nonverbal however smiling and doing thumbs up when asked questions.      Data reviewed today: I reviewed all medications, new labs and imaging results over the last 24 hours. I personally reviewed no images or EKG's today.    Physical Exam   Vital Signs: Temp: 97.9  F (36.6  C) Temp src: Oral BP: 134/68 Pulse: 93 Heart Rate: 93 Resp: 18 SpO2: 98 % O2 Device: None (Room air) Oxygen Delivery: 2 LPM  Weight: 164 lbs 3.2 oz  Exam:  Constitutional: Awake, alert and no distress. Appears comfortable  Head: Normocephalic. No masses, lesions, tenderness or abnormalities  ENT: ENT exam normal, no neck nodes or sinus tenderness  Cardiovascular: RRR.  No murmurs, no rubs or JVD  Respiratory: Normal WOB,b/l equal air entry, no wheezes or crackles   Gastrointestinal: Abdomen soft, non-tender.  G-tube in place BS  normal. No masses, organomegaly  Musculoakeletal : Contractures present in upper and lower extremity    Data   Recent Labs   Lab 05/07/19  0747 05/06/19  2257   WBC 9.9 14.7*   HGB 12.4* 13.1*   MCV 83 83    192    135   POTASSIUM 4.6 4.3   CHLORIDE 105 101   CO2 25 28   BUN 11 13   CR 0.85 0.68   ANIONGAP 7 6   STEVE 8.7 8.4*   * 98   ALBUMIN  --  3.0*   PROTTOTAL  --  7.7   BILITOTAL  --  0.2   ALKPHOS  --  112   ALT  --  26   AST  --  28   LIPASE  --  489*     No results found for this or any previous visit (from the past 24 hour(s)).  Medications     IV fluid REPLACEMENT ONLY       IV fluid REPLACEMENT ONLY       sodium chloride 100 mL/hr at 05/08/19 0214       acetylcysteine  2 mL Nebulization 4x Daily     albuterol  2.5 mg Nebulization Q4H While awake     Brivaracetam  100 mg Per J Tube BID     carBAMazepine  150 mg Per J Tube Q6H     enoxaparin  40 mg Subcutaneous Q24H     fiber modular (NUTRISOURCE FIBER)  1 packet Per Feeding Tube Daily     insulin aspart  1-6 Units Subcutaneous Q4H     levothyroxine  137 mcg Per G Tube Daily     miconazole   Topical BID     pantoprazole  40 mg Per J Tube Daily     piperacillin-tazobactam  3.375 g Intravenous Q6H

## 2019-05-08 NOTE — PLAN OF CARE
Pt nonverbal; HECTOR orientation. VSS; afebrile. NPO-TF continuous with 60mL free water flushes. Nonblanchable redness on coccyx, mepilex applied and WOC consulted-pulsate mattress in place. incontinent T&Rq2hrs. Pulled out his IV; New IV in Left hand. Discharge pending.

## 2019-05-09 ENCOUNTER — TELEPHONE (OUTPATIENT)
Dept: NURSING | Facility: CLINIC | Age: 59
End: 2019-05-09

## 2019-05-09 VITALS
BODY MASS INDEX: 22.27 KG/M2 | OXYGEN SATURATION: 96 % | RESPIRATION RATE: 16 BRPM | DIASTOLIC BLOOD PRESSURE: 61 MMHG | SYSTOLIC BLOOD PRESSURE: 108 MMHG | TEMPERATURE: 97.2 F | WEIGHT: 164.2 LBS | HEART RATE: 88 BPM

## 2019-05-09 LAB
GLUCOSE BLDC GLUCOMTR-MCNC: 133 MG/DL (ref 70–99)
GLUCOSE BLDC GLUCOMTR-MCNC: 169 MG/DL (ref 70–99)
GLUCOSE BLDC GLUCOMTR-MCNC: 90 MG/DL (ref 70–99)
GLUCOSE BLDC GLUCOMTR-MCNC: 95 MG/DL (ref 70–99)

## 2019-05-09 PROCEDURE — 27210429 ZZH NUTRITION PRODUCT INTERMEDIATE LITER

## 2019-05-09 PROCEDURE — 99239 HOSP IP/OBS DSCHRG MGMT >30: CPT | Performed by: INTERNAL MEDICINE

## 2019-05-09 PROCEDURE — A9270 NON-COVERED ITEM OR SERVICE: HCPCS | Performed by: INTERNAL MEDICINE

## 2019-05-09 PROCEDURE — 94640 AIRWAY INHALATION TREATMENT: CPT | Mod: 76

## 2019-05-09 PROCEDURE — 25000132 ZZH RX MED GY IP 250 OP 250 PS 637: Performed by: INTERNAL MEDICINE

## 2019-05-09 PROCEDURE — 94640 AIRWAY INHALATION TREATMENT: CPT

## 2019-05-09 PROCEDURE — 25000132 ZZH RX MED GY IP 250 OP 250 PS 637: Performed by: STUDENT IN AN ORGANIZED HEALTH CARE EDUCATION/TRAINING PROGRAM

## 2019-05-09 PROCEDURE — 25000125 ZZHC RX 250: Performed by: INTERNAL MEDICINE

## 2019-05-09 PROCEDURE — 40000275 ZZH STATISTIC RCP TIME EA 10 MIN

## 2019-05-09 PROCEDURE — 25000128 H RX IP 250 OP 636: Performed by: STUDENT IN AN ORGANIZED HEALTH CARE EDUCATION/TRAINING PROGRAM

## 2019-05-09 PROCEDURE — A9270 NON-COVERED ITEM OR SERVICE: HCPCS | Performed by: STUDENT IN AN ORGANIZED HEALTH CARE EDUCATION/TRAINING PROGRAM

## 2019-05-09 PROCEDURE — 00000146 ZZHCL STATISTIC GLUCOSE BY METER IP

## 2019-05-09 RX ORDER — AMOXICILLIN AND CLAVULANATE POTASSIUM 600; 42.9 MG/5ML; MG/5ML
875 POWDER, FOR SUSPENSION ORAL 2 TIMES DAILY
Qty: 146 ML | Refills: 0 | Status: ON HOLD | OUTPATIENT
Start: 2019-05-09 | End: 2019-05-27

## 2019-05-09 RX ADMIN — ACETYLCYSTEINE: 200 SOLUTION ORAL; RESPIRATORY (INHALATION) at 19:33

## 2019-05-09 RX ADMIN — ALBUTEROL SULFATE 2.5 MG: 2.5 SOLUTION RESPIRATORY (INHALATION) at 19:33

## 2019-05-09 RX ADMIN — HYDROCORTISONE 20 MG: 20 TABLET ORAL at 09:46

## 2019-05-09 RX ADMIN — ACETYLCYSTEINE 2 ML: 200 SOLUTION ORAL; RESPIRATORY (INHALATION) at 06:50

## 2019-05-09 RX ADMIN — ACETYLCYSTEINE 2 ML: 200 SOLUTION ORAL; RESPIRATORY (INHALATION) at 11:06

## 2019-05-09 RX ADMIN — CARBAMAZEPINE 150 MG: 100 SUSPENSION ORAL at 01:42

## 2019-05-09 RX ADMIN — ENOXAPARIN SODIUM 40 MG: 40 INJECTION SUBCUTANEOUS at 01:42

## 2019-05-09 RX ADMIN — PIPERACILLIN SODIUM,TAZOBACTAM SODIUM 3.38 G: 3; .375 INJECTION, POWDER, FOR SOLUTION INTRAVENOUS at 06:00

## 2019-05-09 RX ADMIN — CARBAMAZEPINE 150 MG: 100 SUSPENSION ORAL at 09:46

## 2019-05-09 RX ADMIN — INSULIN ASPART 1 UNITS: 100 INJECTION, SOLUTION INTRAVENOUS; SUBCUTANEOUS at 14:49

## 2019-05-09 RX ADMIN — HYDROCORTISONE 10 MG: 10 TABLET ORAL at 17:29

## 2019-05-09 RX ADMIN — PIPERACILLIN SODIUM,TAZOBACTAM SODIUM 3.38 G: 3; .375 INJECTION, POWDER, FOR SOLUTION INTRAVENOUS at 13:10

## 2019-05-09 RX ADMIN — ALBUTEROL SULFATE 2.5 MG: 2.5 SOLUTION RESPIRATORY (INHALATION) at 15:34

## 2019-05-09 RX ADMIN — MICONAZOLE NITRATE: 2 POWDER TOPICAL at 01:50

## 2019-05-09 RX ADMIN — Medication 1 PACKET: at 09:46

## 2019-05-09 RX ADMIN — LEVOTHYROXINE SODIUM 137 MCG: 112 TABLET ORAL at 09:46

## 2019-05-09 RX ADMIN — PIPERACILLIN SODIUM,TAZOBACTAM SODIUM 3.38 G: 3; .375 INJECTION, POWDER, FOR SOLUTION INTRAVENOUS at 17:29

## 2019-05-09 RX ADMIN — MICONAZOLE NITRATE: 2 POWDER TOPICAL at 09:46

## 2019-05-09 RX ADMIN — ALBUTEROL SULFATE 2.5 MG: 2.5 SOLUTION RESPIRATORY (INHALATION) at 06:50

## 2019-05-09 RX ADMIN — BRIVARACETAM 100 MG: 10 SOLUTION ORAL at 09:46

## 2019-05-09 RX ADMIN — ALBUTEROL SULFATE 2.5 MG: 2.5 SOLUTION RESPIRATORY (INHALATION) at 11:05

## 2019-05-09 RX ADMIN — Medication 40 MG: at 09:45

## 2019-05-09 RX ADMIN — CARBAMAZEPINE 150 MG: 100 SUSPENSION ORAL at 14:34

## 2019-05-09 RX ADMIN — ACETYLCYSTEINE: 200 SOLUTION ORAL; RESPIRATORY (INHALATION) at 15:34

## 2019-05-09 ASSESSMENT — ACTIVITIES OF DAILY LIVING (ADL)
ADLS_ACUITY_SCORE: 45

## 2019-05-09 NOTE — DISCHARGE SUMMARY
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 Carlos Gomez on May   14th at 1:30 p.m. [place] [address]  Please call the clinic at [phone] if you need to cancel or reschedule   this appointment         Follow-up and recommended labs and tests       Follow up with primary care provider, Carlos Gomez, within 7 days for   hospital follow- up.  The following labs/tests are recommended: CBC/BMP.    Chest x-ray in 3 to 4 weeks to document clearance  Your pancreatic cyst has grown in size.  PCP to evaluate and do further   work-up             Unresulted Labs Ordered in the Past 30 Days of this Admission     Date and Time Order Name Status Description    5/6/2019 2245 Blood culture Preliminary     5/6/2019 2245 Blood culture Preliminary           Discharge Disposition   Discharged to home  Condition at discharge: Stable    Hospital Course        Mr. Keyon Farias is a 58-year-old male with a traumatic brain injury with resultant aphasia, spastic paraplegia, seizure, panhypopituitarism, GERD, and recurrent aspiration pneumonias, who was just in hospital from 04/03-04/08/2019 and presents back with fever with concerns for aspiration pneumonia again. \      Recurrent aspiration pneumonia        Fever  -  He has had frequent recurrence events despite G-tube feedings and aspiration precautions.  Historically,  he recovers quite quickly from febrile events following aspiration, potentially suggesting pneumonitis rather than pneumonia.  He has been evaluated by Infectious Disease on several occasions. CT chest on admission shows right lower lobe pneumonia.  -Was placed on IV Zosyn and patient has been afebrile and wean down to room air.  -However patient was still coughing quite a bit and significant residual from stomach  -So tube feeding rate was decreased and he was monitored  - GJ-tube used for medications.   Continue with his PTA albuterol nebs, Mucomyst nebs.   -Next day to feeding rate was increased to his prior to admission rate and he tolerated with no residuals and coughing also did not get worse so discharged to complete oral Augmentin course     Dysphagia  -at baseline he is strict N.p.o., medications and tube feeding through G-tube feeding.    -Nutrition managing tube feeding, however given significant cough and high residuals in the stomach will initially tube feeding decreased to 50 mL/h.   After 24 hours tube feeding rate increased and patient evaluated for tolerance.  He tolerated and was discharged to follow-up with his PCP  -Continue with Protonix      Traumatic brain injury with sequelae of aphasia and spastic paralysis.   Continue with home care services at discharge     Seizure disorder.    Continue prior to admission, Tegretol and levetiracetam      Panhypopituitarism.    Resume PTA hydrocortisone 20 mg in morning and 10 mg nightly     Hypothyroidism.    Continue PTA Synthroid via G tube       Hypogonadotropic hypogonadism.    Resume PTA Testosterone injection in the outpatient setting.       Pancreatic Cyst.   CT showed a  3.9 cm cyst in the tail of the pancreas, increased in size since 5/16/2018. Will need outpatient follow-up    Consultations This Hospital Stay   WOUND OSTOMY CONTINENCE NURSE  IP CONSULT  PHARMACY IP CONSULT  NUTRITION SERVICES ADULT IP CONSULT  PHARMACY IP CONSULT  CARE TRANSITION  RN/SW IP CONSULT    Code Status   Full Code    Time Spent on this Encounter   I, Pamela Soliz, personally saw the patient today and spent greater than 30 minutes discharging this patient.       Pamela Soliz MD  [facility]  ______________________________________________________________________    Physical Exam   Vital Signs: Temp: 98.3  F (36.8  C) Temp src: Axillary BP: 113/61   Heart Rate: 89 Resp: 16 SpO2: 99 % O2 Device: None (Room air)    Weight: 164 lbs 3.2 oz  Constitutional: Awake, alert and no distress. Appears comfortable, aphasic, communicates by giving a thumbs up and follows tracking  Head: Normocephalic. No masses, lesions, tenderness or abnormalities  ENT: ENT exam normal, no neck nodes or sinus tenderness  Cardiovascular: RRR.  No murmurs, no rubs or JVD  Respiratory: Normal WOB,b/l equal air entry, no wheezes or crackles   Gastrointestinal: Abdomen soft, non-tender.  G-tube in place BS normal. No masses, organomegaly  Musculoakeletal : Contractures present in upper and lower extremity    Primary Care Physician   Carlos Gomez    Discharge Orders      Home care nursing referral      Follow-up and recommended labs and tests     You are scheduled for a Primary follow up with Dr. Carlos Gomez on May 14th at 1:30 p.m. [place] [address]  Please call the clinic at [phone] if you need to cancel or reschedule this appointment     Reason for your hospital stay    Aspiration pneumonia     Follow-up and recommended labs and tests     Follow up with primary care provider, Carlos Gomez, within 7 days for hospital follow- up.  The following labs/tests are recommended: CBC/BMP.  Chest x-ray in 3 to 4 weeks to document clearance     Activity    Your activity upon discharge: activity as tolerated     Tubes and drains    You are going home with the following tubes or drains: feeding tube G-Tube.   Tube  cares per hospital or home care instructions     Full Code    As documented multiple times previously     Diet    Follow this diet upon discharge: Orders Placed This Encounter      Adult Formula Drip Feeding: Continuous Isosource 1.5; Jejunostomy; Goal Rate: 100; mL/hr; From: 7:00 AM; 7:00 PM; Medication - Feeding Tube Flush Frequency: At least 15-30 mL water before and after medication administration and with tube clogging      NPO for Medical/Clinical Reasons Except for: No Exceptions       Significant Results and Procedures   Most Recent 3 CBC's:  Recent Labs   Lab Test 05/07/19 0747 05/06/19 2257 04/22/19  2347   WBC 9.9 14.7* 11.2*   HGB 12.4* 13.1* 12.6*   MCV 83 83 83    192 215     Most Recent 3 BMP's:  Recent Labs   Lab Test 05/07/19 0747 05/06/19 2257 04/22/19  2347    135 139   POTASSIUM 4.6 4.3 4.1   CHLORIDE 105 101 105   CO2 25 28 27   BUN 11 13 13   CR 0.85 0.68 0.80   ANIONGAP 7 6 7   STEVE 8.7 8.4* 8.5   * 98 125*     Most Recent 2 LFT's:  Recent Labs   Lab Test 05/06/19 2257 04/22/19  2347   AST 28 26   ALT 26 27   ALKPHOS 112 98   BILITOTAL 0.2 0.1*     Most Recent 3 INR's:  Recent Labs   Lab Test 02/07/17  0452 01/30/17  0340 01/25/17  0426   INR 1.27* 1.32* 1.49*     Most Recent INR's and Anticoagulation Dosing History:  Anticoagulation Dose History     Recent Dosing and Labs Latest Ref Rng & Units 12/1/2016 1/22/2017 1/22/2017 1/23/2017 1/25/2017 1/30/2017 2/7/2017    INR 0.86 - 1.14 1.30(H) 2.48(H) 2.58(H) 1.53(H) 1.49(H) 1.32(H) 1.27(H)      ,   Results for orders placed or performed during the hospital encounter of 05/06/19   CT Chest/Abdomen/Pelvis w Contrast    Narrative    CT CHEST/ABDOMEN/PELVIS W CONTRAST  5/6/2019 11:51 PM    HISTORY: Sepsis. Cough. History of aspiration, vomiting.    TECHNIQUE: CT scan obtained of the chest, abdomen, and pelvis with  oral and IV contrast. 80 mL Isovue-370 injected. Radiation dose for  this scan was reduced using automated  exposure control, adjustment of  the mA and/or kV according to patient size, or iterative  reconstruction technique.    COMPARISON: 12/18/2018. 5/16/2018.    FINDINGS:  Chest: Breathing motion artifact degrades images throughout the study.  There is a right lower lobe infiltrate consistent with pneumonia. Mild  dependent atelectasis bilaterally. No pneumothorax or pleural  effusion. There is a mildly enlarged subcarinal lymph node similar to  the previous exam. No hilar or axillary lymph node enlargement. The  heart size is normal.    Abdomen: There is a small cyst in the left lobe of the liver. The  spleen, gallbladder, adrenal glands and kidneys are normal in  appearance. There is a cyst in the pancreas tail measuring  approximately 3.9 x 3.1 cm, increased in size from 5/16/2018. There is  no abdominal or pelvic lymph node enlargement.    Pelvis: There is a large amount of stool in the rectosigmoid colon. No  bowel obstruction or inflammation. There is a GJ tube in place. No  free intraperitoneal gas or fluid.      Impression    IMPRESSION:  1. Right lower lobe pneumonia.  2. 3.9 cm cyst in the tail of the pancreas, increased in size since  5/16/2018. Because of the size and interval growth, biopsy or surgical  consultation should be considered.  3. Large amount of stool in the rectosigmoid colon. No bowel  obstruction.    BECKIE KATE MD       Discharge Medications   Current Discharge Medication List      START taking these medications    Details   amoxicillin-clavulanate (AUGMENTIN-ES) 600-42.9 MG/5ML suspension Take 7.3 mLs (875 mg) by mouth 2 times daily for 10 days  Qty: 146 mL, Refills: 0    Associated Diagnoses: Aspiration pneumonia of both lower lobes due to gastric secretions (H)         CONTINUE these medications which have NOT CHANGED    Details   acetaminophen (TYLENOL) 500 MG tablet 1,000 mg by Oral or FT or NG tube route every 6 hours as needed for mild pain      acetylcysteine (MUCOMYST) 20 % neb  solution Take 2 mLs by nebulization 4 times daily  Qty: 300 vial, Refills: 0    Associated Diagnoses: Pneumonia of both lower lobes due to infectious organism (H)      albuterol (PROVENTIL) (5 MG/ML) 0.5% neb solution Take 2.5 mg by nebulization 4 times daily 0700 1100 1500 1900 with mucomyst      bacitracin ointment Apply topically daily as needed for wound care To PEG site.       Brivaracetam (BRIVIACT) 10 MG/ML solution 100 mg by Oral or FT or NG tube route 2 times daily @0900 and 2100      calcium carbonate 1250 (500 CA) MG/5ML SUSP suspension 5 mLs (1,250 mg) by Per J Tube route 3 times daily (with meals)  Qty: 450 mL    Associated Diagnoses: Malnutrition (H)      carBAMazepine (TEGRETOL) 100 MG/5ML suspension Take 150 mg by mouth every 6 hours At 06:00, 12:00, 18:00 and 24:00 for seizures      ferrous sulfate 220 (44 Fe) MG/5ML ELIX 220 mg by Per Feeding Tube route daily      glycopyrrolate (GLYCATE) 2 MG tablet Take 1-2 mg by mouth 2 times daily as needed (secretions) 1/2 to 1 tablet (1 - 2 mg) up to twice daily if needed for secretions.      !! hydrocortisone (CORTEF) 5 MG tablet 10 mg by Oral or FT or NG tube route daily (with dinner) At 1500      !! hydrocortisone (CORTEF) 5 MG tablet 20 mg by Oral or FT or NG tube route every morning       levothyroxine (SYNTHROID/LEVOTHROID) 137 MCG tablet 137 mcg by Oral or FT or NG tube route daily @ 05:00      melatonin (MELATONIN) 1 MG/ML LIQD liquid 6 mg by Per NG tube route At Bedtime       Multiple Vitamins-Minerals (CENTRUM SILVER) per tablet Take 1 tablet by mouth daily Crush and feed via j-tube      pantoprazole (PROTONIX) 2 mg/mL SUSP suspension 20 mLs (40 mg) by Per J Tube route daily  Qty: 400 mL, Refills: 1    Associated Diagnoses: Gastroesophageal reflux disease, esophagitis presence not specified      potassium & sodium phosphates (NEUTRA-PHOS) 280-160-250 MG Packet Take 1 packet by mouth 3 times daily 0900, 1500, 2100.       testosterone cypionate  (DEPOTESTOTERONE CYPIONATE) 200 MG/ML injection Inject 76 mg into the muscle See Admin Instructions Every 2 weeks on Fridays   76 mg or 0.38 mL      Vitamin D, Cholecalciferol, 1000 units TABS Take 2,000 Units by mouth every morning Crush and feed via j-tube      Guar Gum (FIBER MODULAR, NUTRISOURCE FIBER,) packet 1 packet by Per G Tube route daily  Qty: 30 packet, Refills: 0    Associated Diagnoses: Pneumonia of both lower lobes due to infectious organism (H)      order for DME Equipment being ordered: Nebulizer  Qty: 1 Units, Refills: 0    Associated Diagnoses: Pneumonia of both lower lobes due to infectious organism (H)       !! - Potential duplicate medications found. Please discuss with provider.        Allergies   Allergies   Allergen Reactions     Dilantin [Phenytoin Sodium]      Valproic Acid      Toxicity c bone marrow suspension, elevated ammonia levels

## 2019-05-09 NOTE — CONSULTS
Care Transition Initial Assessment - RN        Met with: (did not meet with patient/family awaiting MD to discuss discharge with guardian  DATA   Active Problems:    Aspiration pneumonia (H)            Primary Care MD Name: Carlos Gomez  Contact information and PCP information verified: Yes                        Insurance concerns: No Insurance issues identified  ASSESSMENT  Patient currently receives the following services:  [facility] SN, [facility]  12 Hr coverage (currently Tuesday, Wednesday and Friday) [facility].5 [facility] every 7 days) and TF with supplies through [facility] (please see previous progress notes)        Identified issues/concerns regarding health management: patient is at an extreme readmission risk.  Patient has had over 8 hospitalizations this year for Aspiration PNA.  Patient was admitted again this stay with Aspiration PNA.  Patient received IV antibiotics.  Patient is possibly ready for discharge to home today per discharging provider.  Plan is to increase patient's TF to goal (100 ml/hr) and discharge to home if tolerating today.  Writer has not connected with the patients guardian yet as MD is seeing how he does through the day.  Writer placed calls to [facility]  And [facility].  [facility] would be able to provide 12 hour shift tomorrow 5/10.  [facility] is working on verifying whether or not they are able to provide PCA services today 5/9.  Writer awaits a call back.  Writer did schedule the patient for PCP follow up 5/14 at 13:30 and this information is in the discharge follow up order set.  Writer emailed [facility] as patient is open to SN with possible discharge date and awaiting further direction on whether or not he would need a SOC for labs etc.  Writer to continue working on discharge planning and will await update from discharging provider as well as PCA company for availability of hours today 5/96 for safe  discharge planning. SW to arrange stretcher ride home when discharge is finalized.    PLAN  Financial costs for the patient include n/a .  Patient given options and choices for discharge yes .  Patient/family is agreeable to the plan?  N/A awaiting further information from MD and MD to call guardian with medical status of discharge and answer questions  Patient anticipates discharging to home with resumption of SN HC/PCA  .        Patient anticipates needs for home equipment: No  Plan/Disposition: Home   Appointments:     PCP follow up Dr. Carlos Gomez 5/14 at 13:30 hand off to be sent today.   Care  (CTS) will continue to follow as needed.

## 2019-05-09 NOTE — PLAN OF CARE
Pt is nonverbal, HECTOR orientation. Denies pain by shaking head no when asked. VSS. Strict NPO. Has tube feeding from 7a-7p, rate decreased d/t coughing & high residuals. Clamped overnight. 60mL free water flushes q4 hours. Excoriated area on coccyx & groin with scant bleeding- ANANYA per WOC orders. Gets slightly agitated when cleaning groin/coccyx area. Powder applied to groin and buttocks. Incontinent. No BM this shift. Urinary frequency. Turn/repositioned q2hr. L PIV SL with int abx. Discharge plans pending.

## 2019-05-09 NOTE — PLAN OF CARE
HECTOR orientation, but able to follow commands. Pleasant and cooperative. TF increased to goal rate of 100cc/hr at 11am, plan to monitor for 4 hours and assess- possible discharge this afternoon if pt tolerating increase. Dressing on J tube CDI. IV antibiotics. Incontinent of urine and stool. Groin reddened and excoriated, powder applied. Barrier cream on coccyx. Pulsate mattress.

## 2019-05-09 NOTE — PROGRESS NOTES
Writer talked to discharging provider today.  Patient may be ready for discharge to home with resumption services today.   Writer called [facility]  Phone#[phone] to check for availability of HC RN to be in the home today.  Per Nicky they are able to provide their 12 hour shift 7a-7p tomorrow 5/10 (recently they are only providing Tu/W/F 12 hour shifts and pending a conference to try and increase visit frequency).  Writer to call Nicky back today with update so that they will know if the patient discharges today 5/9 so that they will staff for 7 a.m. 5/10 or if the patient does not discharge until 5/10 they would like to know the discharge time (stretcher ride) so that they can staff for his arrival.     Writer called [facility] Phone [phone] talked to Stephania to see if they would be able to provide PCA hours for coverage today for possible discharge.  Stephania to check in with the patient's PCA's for availability for today and will call back with an update.  The Patient's mother is one of the PCA's and underwent surgery. Writer to await a call back with update from PCA provider/    [facility] SN to resume services and they are aware of possible discharge. Will check with Hospitalist to see if they need labs or resumption sooner due to elevated risk.   Writer scheduled patient for PCP follow up 5/14 13:30 in discharge follow up orders    Writer to continue to follow up on all of the above for a possible discharge of 5/9.    Addendum: 5/9 15:46  Called the patients Kylah Nuñez regarding discharge planning [place] would like the bedside RN to call her sister listed in Epic to go over discharge planning.  Any new meds to go with the patient.  Writer to contact RX to get meds filled here.  SW to schedule transport 19:30  [facility] 5/10 7a-7p orders to be faxed no further needs identified

## 2019-05-09 NOTE — PROGRESS NOTES
SW Discharge Note:    D/I:  Patient is going to be discharging to home today.  SW requested to arrange for stretcher ride for patient.  CHE placed call to [facility] to arrange for a stretcher ride for patient at 7:30 for tonight.  Care Coordinator spoke with patient's mother/guardian to update of discharge plan.  PCS form completed, faxed to [facility] and provided to [facility].     P: SW will remain available to assist with discharge planning as needed.    GILMER Perez, LGSW

## 2019-05-09 NOTE — PLAN OF CARE
RN called Stephania, sister  to pt's mom as requested by pt's legal guardian to go over discharge plan at 1624 pm today. RN has communicated to Stephania that a discharge instructions and medication will be sent along with pt. Stephania verbalized understanding.

## 2019-05-09 NOTE — PLAN OF CARE
Pt is nonverbal, HECTOR orientation. VSS on RA. Strict NPO. Has tube feeding from 7a-7p, rate decreased during the day d/t coughing. Clamped overnight. 60mL free water flushes q4 hours. Excoriated area on coccyx, ANANYA per WOC orders. Incontinent. Powder applied to groin and buttocks. Turn/repositioned q2hr. L PIV SL. Discharge plans pending.

## 2019-05-10 NOTE — PLAN OF CARE
Alert but unable to assess orientation due to significant aphasia. VSS on RA. All discharge instructions reviewed with pt's sister as requested by pt's mother. Discharge meds sent with pt. GJ tube clamped, no residual noted. Pt stable at time of discharge. Discharged with HE transport via stretchers. All discharge instructions and belongings sent with pt.

## 2019-05-10 NOTE — TELEPHONE ENCOUNTER
Stephania calling with several questions regarding Keyon's hospital stay and medications. As I was going through information per [facility], call was lost.  Brooklyn Anderson RN [facility]

## 2019-05-15 ENCOUNTER — HOSPITAL ENCOUNTER (EMERGENCY)
Facility: CLINIC | Age: 59
Discharge: HOME | End: 2019-05-15
Attending: EMERGENCY MEDICINE | Admitting: EMERGENCY MEDICINE
Payer: MEDICARE

## 2019-05-15 ENCOUNTER — APPOINTMENT (OUTPATIENT)
Dept: GENERAL RADIOLOGY | Facility: CLINIC | Age: 59
End: 2019-05-15
Attending: EMERGENCY MEDICINE
Payer: MEDICARE

## 2019-05-15 VITALS
TEMPERATURE: 99.5 F | DIASTOLIC BLOOD PRESSURE: 71 MMHG | HEART RATE: 91 BPM | RESPIRATION RATE: 18 BRPM | OXYGEN SATURATION: 95 % | SYSTOLIC BLOOD PRESSURE: 102 MMHG

## 2019-05-15 DIAGNOSIS — R50.9 FEVER, UNSPECIFIED FEVER CAUSE: ICD-10-CM

## 2019-05-15 LAB
ALBUMIN UR-MCNC: 10 MG/DL
ANION GAP SERPL CALCULATED.3IONS-SCNC: 5 MMOL/L (ref 3–14)
APPEARANCE UR: CLEAR
BASOPHILS # BLD AUTO: 0.1 10E9/L (ref 0–0.2)
BASOPHILS NFR BLD AUTO: 0.9 %
BILIRUB UR QL STRIP: NEGATIVE
BUN SERPL-MCNC: 16 MG/DL (ref 7–30)
CALCIUM SERPL-MCNC: 8.9 MG/DL (ref 8.5–10.1)
CHLORIDE SERPL-SCNC: 105 MMOL/L (ref 94–109)
CO2 SERPL-SCNC: 29 MMOL/L (ref 20–32)
COLOR UR AUTO: YELLOW
CREAT SERPL-MCNC: 0.93 MG/DL (ref 0.66–1.25)
DIFFERENTIAL METHOD BLD: ABNORMAL
EOSINOPHIL # BLD AUTO: 0.8 10E9/L (ref 0–0.7)
EOSINOPHIL NFR BLD AUTO: 6.8 %
ERYTHROCYTE [DISTWIDTH] IN BLOOD BY AUTOMATED COUNT: 15.7 % (ref 10–15)
GFR SERPL CREATININE-BSD FRML MDRD: >90 ML/MIN/{1.73_M2}
GLUCOSE SERPL-MCNC: 124 MG/DL (ref 70–99)
GLUCOSE UR STRIP-MCNC: NEGATIVE MG/DL
HCT VFR BLD AUTO: 43.9 % (ref 40–53)
HGB BLD-MCNC: 14.7 G/DL (ref 13.3–17.7)
HGB UR QL STRIP: NEGATIVE
IMM GRANULOCYTES # BLD: 0 10E9/L (ref 0–0.4)
IMM GRANULOCYTES NFR BLD: 0.2 %
KETONES UR STRIP-MCNC: NEGATIVE MG/DL
LEUKOCYTE ESTERASE UR QL STRIP: ABNORMAL
LYMPHOCYTES # BLD AUTO: 5.4 10E9/L (ref 0.8–5.3)
LYMPHOCYTES NFR BLD AUTO: 45.2 %
MCH RBC QN AUTO: 27.9 PG (ref 26.5–33)
MCHC RBC AUTO-ENTMCNC: 33.5 G/DL (ref 31.5–36.5)
MCV RBC AUTO: 83 FL (ref 78–100)
MONOCYTES # BLD AUTO: 1.6 10E9/L (ref 0–1.3)
MONOCYTES NFR BLD AUTO: 13.3 %
MUCOUS THREADS #/AREA URNS LPF: PRESENT /LPF
NEUTROPHILS # BLD AUTO: 4 10E9/L (ref 1.6–8.3)
NEUTROPHILS NFR BLD AUTO: 33.6 %
NITRATE UR QL: NEGATIVE
NRBC # BLD AUTO: 0 10*3/UL
NRBC BLD AUTO-RTO: 0 /100
PH UR STRIP: 6.5 PH (ref 5–7)
PLATELET # BLD AUTO: 238 10E9/L (ref 150–450)
PLATELET # BLD EST: ABNORMAL 10*3/UL
POTASSIUM SERPL-SCNC: 3.7 MMOL/L (ref 3.4–5.3)
RBC # BLD AUTO: 5.27 10E12/L (ref 4.4–5.9)
RBC #/AREA URNS AUTO: 1 /HPF (ref 0–2)
RBC MORPH BLD: NORMAL
SODIUM SERPL-SCNC: 139 MMOL/L (ref 133–144)
SOURCE: ABNORMAL
SP GR UR STRIP: 1.02 (ref 1–1.03)
SQUAMOUS #/AREA URNS AUTO: 1 /HPF (ref 0–1)
UROBILINOGEN UR STRIP-MCNC: 4 MG/DL (ref 0–2)
WBC # BLD AUTO: 12.5 10E9/L (ref 4–11)
WBC #/AREA URNS AUTO: 2 /HPF (ref 0–5)

## 2019-05-15 PROCEDURE — 99284 EMERGENCY DEPT VISIT MOD MDM: CPT | Mod: 25

## 2019-05-15 PROCEDURE — 80048 BASIC METABOLIC PNL TOTAL CA: CPT | Performed by: EMERGENCY MEDICINE

## 2019-05-15 PROCEDURE — 81001 URINALYSIS AUTO W/SCOPE: CPT | Performed by: EMERGENCY MEDICINE

## 2019-05-15 PROCEDURE — 85025 COMPLETE CBC W/AUTO DIFF WBC: CPT | Performed by: EMERGENCY MEDICINE

## 2019-05-15 PROCEDURE — 71046 X-RAY EXAM CHEST 2 VIEWS: CPT

## 2019-05-15 ASSESSMENT — ENCOUNTER SYMPTOMS: FEVER: 1

## 2019-05-15 NOTE — ED PROVIDER NOTES
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 Per EMS, patient has clear instructions to be brought to the ED if fever is greater than 100F and was found to have an axillary temperature of 100.1F. EMS was called, with no medications given en route .    Here in the ED, patient is able to nod/shake his head when asked questions and both temporal and oral temperatures less than 100F, as documented below in vitals. Patient denies any current pain and nods when asked if he would like to go home.     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  DVT of upper extremity  GERD  Panhypopituitarism  Pneumonia  Seizures  Septic shock  Spastic hemiplegia affecting dominant side  Thyroid disease  Tracheostomy care  TBI  Unspecified cerebral artery occlusion with cerebral infarction  UTI  Ventricular fibrillation  Ventricular tachyarrhythmia  Cardiac arrest    Past Surgical History:    Endoscopic ultrasound upper GI tract  Endoscopic ultrasound, EGD, necrosectomy  EGD, combined x3  Head & neck surgery - reconstructive facial surgery following accident in 1989  IR Gastro jejunostomy tube change x3  Laparoscopic appendectomy  Laparoscopic assisted insertion tube gastrotomy  Orthopedic surgery - right hand repair  Tracheostomy x2  Vascular surgery    Family History:    Father - cancer    Social History:  The patient was accompanied to  the ED by EMS.  Smoking Status: Former  Smokeless Tobacco: No  Alcohol Use: No  Drug Use: No   Marital Status:  Single [1]     Review of Systems   Constitutional: Positive for fever.   All other systems reviewed and are negative.      Physical Exam   Vitals:  Patient Vitals for the past 24 hrs:   BP Temp Temp src Pulse Resp SpO2   05/15/19 0617 -- 99.5  F (37.5  C) Rectal -- -- --   05/15/19 0430 -- -- -- 98 18 91 %   05/15/19 0415 -- -- -- -- -- 92 %   05/15/19 0400 108/79 -- -- -- -- 92 %   05/15/19 0354 108/79 98.9  F (37.2  C) Oral 118 -- 92 %      Physical Exam  Vitals: reviewed by me  General: Pt seen on [facility], pleasant, cooperative, and alert to conversation  Eyes: Tracking well, clear conjunctiva BL  ENT: MMM, midline trachea.   Lungs:  No tachypnea, no accessory muscle use. No respiratory distress.   CV: Rate as above, regular rhythm.    Abd: Soft, non tender, no guarding, no rebound. Non distended  MSK: no peripheral edema or joint effusion.  No evidence of trauma  Skin: No rash, normal turgor and temperature  Neuro: Clear speech and no facial droop.  Psych: Not RIS, no e/o AH/    Emergency Department Course     Imaging:  Radiology findings were communicated with the patient who voiced understanding of the findings.  XR Chest:  IMPRESSION: No acute abnormality.  Reading per radiology.     Laboratory:  Laboratory findings were communicated with the patient who voiced understanding of the findings.  CBC: WBC 12.5 (H) o/w WNL (HGB 14.7, )  BMP: Glucose 124 (H) o/w WNL (Creatinine 0.93)    UA with Microscopic: Protein Albumin 10 (A), Urobilinogen mg/dL 4.0 (H), Leukocyte Esterase Urine Trace (A), Mucous Urine Present (A) o/w WNL     Emergency Department Course:  Nursing notes and vitals reviewed.  IV was inserted and blood was drawn for laboratory testing, results above.  The patient provided a urine sample here in the emergency department. This was sent for laboratory testing, findings  above.  The patient was sent for a XR Chest while in the emergency department, results above.      (4406)   I performed an exam of the patient as documented above. History obtained from EMS. Discussed vital signs with patient and patient nods that he would like to go home. Mother is patient's caregiver at home.     (1027)   Spoke with Savannah Farias, patient's mother and caregiver. Discussed results and noted that patient is safe for discharge back home. She is in agreement with this. Patient will be discharged.    I discussed the treatment plan with the patient. They expressed understanding of this plan and consented to discharge. They will be discharged home with instructions for care and follow up. In addition, the patient will return to the emergency department if their symptoms persist, worsen, if new symptoms arise or if there is any concern.  All questions were answered.     I personally reviewed the laboratory results with the mother and answered all related questions prior to discharge.    Impression & Plan      Medical Decision Making:  A 58 year old gentleman who presents to the emergency room for history of a fever at home. Never had a true fever, reportedly per mother measured at 100.1F. Here in the ER, his core temp is unremarkable, his white count is more or less at his baseline, and he, himself, states that he has no symptoms. He does a negative chest x-ray and a negative urinalysis as well, no evidence of cellulitis, a benign abdomen, and normal vital signs. I spoke with his mom via the phone just now and she is okay with him coming back. She understands that no clear cause of patient's symptoms have been found and he may need to come back to the ER with any new symptoms or if things get worse. Patient seems to be okay with plan as best as I can tell, but mother also states that he is in affirmation with this plan. Will discharge as above.     Diagnosis:    ICD-10-CM    1. Fever, unspecified fever  cause R50.9     by history only        Disposition:   Discharged.     Scribe Disclosure:  I, Flor Montana, am serving as a scribe at 4:09 AM on 5/15/2019 to document services personally performed by Mark Moyer*, based on my observations and the provider's statements to me.  5/15/2019    EMERGENCY DEPARTMENT       Mark Moyer MD  05/15/19 0649

## 2019-05-15 NOTE — ED NOTES
Patient's belongings found in locker of his room. Mom called and updated her that belongings will be with security in lost and found.

## 2019-05-15 NOTE — ED NOTES
Report received from Keyon CHAPARRO RN. Patient being discharged and waiting for [facility] to transport home.

## 2019-05-16 ENCOUNTER — APPOINTMENT (OUTPATIENT)
Dept: GENERAL RADIOLOGY | Facility: CLINIC | Age: 59
End: 2019-05-16
Attending: EMERGENCY MEDICINE
Payer: MEDICARE

## 2019-05-16 ENCOUNTER — HOSPITAL ENCOUNTER (EMERGENCY)
Facility: CLINIC | Age: 59
Discharge: HOME | End: 2019-05-17
Attending: EMERGENCY MEDICINE | Admitting: EMERGENCY MEDICINE
Payer: MEDICARE

## 2019-05-16 DIAGNOSIS — R50.9 ELEVATED TEMPERATURE: ICD-10-CM

## 2019-05-16 DIAGNOSIS — Z87.01 HISTORY OF ASPIRATION PNEUMONIA: ICD-10-CM

## 2019-05-16 LAB
ALBUMIN SERPL-MCNC: 3 G/DL (ref 3.4–5)
ALP SERPL-CCNC: 93 U/L (ref 40–150)
ALT SERPL W P-5'-P-CCNC: 27 U/L (ref 0–70)
ANION GAP SERPL CALCULATED.3IONS-SCNC: 5 MMOL/L (ref 3–14)
AST SERPL W P-5'-P-CCNC: 22 U/L (ref 0–45)
BASOPHILS # BLD AUTO: 0.1 10E9/L (ref 0–0.2)
BASOPHILS NFR BLD AUTO: 1.2 %
BILIRUB SERPL-MCNC: 0.2 MG/DL (ref 0.2–1.3)
BUN SERPL-MCNC: 15 MG/DL (ref 7–30)
CALCIUM SERPL-MCNC: 8.6 MG/DL (ref 8.5–10.1)
CHLORIDE SERPL-SCNC: 103 MMOL/L (ref 94–109)
CO2 BLDCOV-SCNC: 30 MMOL/L (ref 21–28)
CO2 SERPL-SCNC: 33 MMOL/L (ref 20–32)
CREAT SERPL-MCNC: 0.94 MG/DL (ref 0.66–1.25)
DIFFERENTIAL METHOD BLD: ABNORMAL
EOSINOPHIL # BLD AUTO: 0.7 10E9/L (ref 0–0.7)
EOSINOPHIL NFR BLD AUTO: 8.4 %
ERYTHROCYTE [DISTWIDTH] IN BLOOD BY AUTOMATED COUNT: 15.8 % (ref 10–15)
GFR SERPL CREATININE-BSD FRML MDRD: 90 ML/MIN/{1.73_M2}
GLUCOSE SERPL-MCNC: 90 MG/DL (ref 70–99)
HCT VFR BLD AUTO: 40.7 % (ref 40–53)
HGB BLD-MCNC: 13.2 G/DL (ref 13.3–17.7)
IMM GRANULOCYTES # BLD: 0 10E9/L (ref 0–0.4)
IMM GRANULOCYTES NFR BLD: 0.1 %
LACTATE BLD-SCNC: 1.4 MMOL/L (ref 0.7–2.1)
LYMPHOCYTES # BLD AUTO: 3.7 10E9/L (ref 0.8–5.3)
LYMPHOCYTES NFR BLD AUTO: 42.9 %
MCH RBC QN AUTO: 27.3 PG (ref 26.5–33)
MCHC RBC AUTO-ENTMCNC: 32.4 G/DL (ref 31.5–36.5)
MCV RBC AUTO: 84 FL (ref 78–100)
MONOCYTES # BLD AUTO: 1.4 10E9/L (ref 0–1.3)
MONOCYTES NFR BLD AUTO: 15.9 %
NEUTROPHILS # BLD AUTO: 2.7 10E9/L (ref 1.6–8.3)
NEUTROPHILS NFR BLD AUTO: 31.5 %
NRBC # BLD AUTO: 0 10*3/UL
NRBC BLD AUTO-RTO: 0 /100
PCO2 BLDV: 49 MM HG (ref 40–50)
PH BLDV: 7.4 PH (ref 7.32–7.43)
PLATELET # BLD AUTO: 165 10E9/L (ref 150–450)
PO2 BLDV: 32 MM HG (ref 25–47)
POTASSIUM SERPL-SCNC: 3.6 MMOL/L (ref 3.4–5.3)
PROT SERPL-MCNC: 7.7 G/DL (ref 6.8–8.8)
RBC # BLD AUTO: 4.84 10E12/L (ref 4.4–5.9)
SAO2 % BLDV FROM PO2: 61 %
SODIUM SERPL-SCNC: 141 MMOL/L (ref 133–144)
WBC # BLD AUTO: 8.7 10E9/L (ref 4–11)

## 2019-05-16 PROCEDURE — 96360 HYDRATION IV INFUSION INIT: CPT

## 2019-05-16 PROCEDURE — 80053 COMPREHEN METABOLIC PANEL: CPT | Performed by: EMERGENCY MEDICINE

## 2019-05-16 PROCEDURE — 99284 EMERGENCY DEPT VISIT MOD MDM: CPT | Mod: 25

## 2019-05-16 PROCEDURE — 25000128 H RX IP 250 OP 636: Performed by: EMERGENCY MEDICINE

## 2019-05-16 PROCEDURE — 83605 ASSAY OF LACTIC ACID: CPT

## 2019-05-16 PROCEDURE — 82803 BLOOD GASES ANY COMBINATION: CPT

## 2019-05-16 PROCEDURE — 85025 COMPLETE CBC W/AUTO DIFF WBC: CPT | Performed by: EMERGENCY MEDICINE

## 2019-05-16 PROCEDURE — 71046 X-RAY EXAM CHEST 2 VIEWS: CPT

## 2019-05-16 RX ORDER — SODIUM CHLORIDE 9 MG/ML
1000 INJECTION, SOLUTION INTRAVENOUS CONTINUOUS
Status: DISCONTINUED | OUTPATIENT
Start: 2019-05-16 | End: 2019-05-17 | Stop reason: HOSPADM

## 2019-05-16 RX ADMIN — SODIUM CHLORIDE 1000 ML: 9 INJECTION, SOLUTION INTRAVENOUS at 22:35

## 2019-05-16 NOTE — ED AVS SNAPSHOT
Emergency Department  [address] [place] [id number]  Phone:  [phone]  Fax:  [phone]                                    Keyon Farias   MRN: [id number]    Department:   Emergency Department   Date of Visit:  5/16/2019           After Visit Summary Signature Page    I have received my discharge instructions, and my questions have been answered. I have discussed any challenges I see with this plan with the nurse or doctor.    ..........................................................................................................................................  Patient/Patient Representative Signature      ..........................................................................................................................................  Patient Representative Print Name and Relationship to Patient    ..................................................               ................................................  Date                                   Time    ..........................................................................................................................................  Reviewed by Signature/Title    ...................................................              ..............................................  Date                                               Time          22EPIC Rev 08/18

## 2019-05-17 VITALS
DIASTOLIC BLOOD PRESSURE: 73 MMHG | RESPIRATION RATE: 16 BRPM | SYSTOLIC BLOOD PRESSURE: 110 MMHG | OXYGEN SATURATION: 98 % | TEMPERATURE: 98.7 F

## 2019-05-17 NOTE — ED TRIAGE NOTES
Recent discharge with pneumonia dx. NH took temp and noted it to be 99.4 and subsequently took temp [id number] w/ tmax of 100.3. Pt was given tylenol at around 2015.

## 2019-05-17 NOTE — ED PROVIDER NOTES
History     Chief Complaint:  Fever      HPI: The patient is nonverbal but appropriately responds to Yes / No questioning with gestures including head nods.         Keyon Farias is a 58 year old male who presents with concerns of a fever measured at home by his PCP. Unfortunately here I am unable to get much of a history. He denies any new pain or trouble breathing.     Allergies:  Dilantin [Phenytoin Sodium]  Valproic Acid     Medications:    Acetaminophen (tylenol) 500 mg tablet  Acetylcysteine (mucomyst) 20 % neb solution  Albuterol (Proventil) (5 mg/ml) 0.5% neb solution  Amoxicillin-clavulanate (Augmentin-es) 600-42.9 mg/5ml suspension  Bacitracin ointment  Brivaracetam (briviact) 10 mg/ml solution  Calcium carbonate 1250 (500 ca) mg/5ml susp suspension  Carbamazepine (tegretol) 100 mg/5ml suspension  Ferrous sulfate 220 (44 fe) mg/5ml elix  Glycopyrrolate (glycate) 2 mg tablet  Guar gum (fiber modular, nutrisource fiber,) packet  Hydrocortisone (cortef) 5 mg tablet  Hydrocortisone (cortef) 5 mg tablet  Levothyroxine (synthroid/levothroid) 137 mcg tablet  Melatonin (melatonin) 1 mg/ml liqd liquid  Multiple vitamins-minerals (centrum silver) per tablet  Order for dme  Pantoprazole (protonix) 2 mg/ml susp suspension  Potassium & sodium phosphates (neutra-phos) 280-160-250 mg packet  Testosterone cypionate (depotestoterone cypionate) 200 mg/ml injection  Vitamin D, Cholecalciferol, 1000 units      Past Medical History:    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)   DVT of upper extremity (deep vein thrombosis) (H)   Gastro-oesophageal reflux disease   Panhypopituitarism (H)  Seizures (H)   Septic shock (H)   Spastic hemiplegia affecting dominant side (H)   Thyroid disease   Tracheostomy care (H)   Traumatic brain injury (H)   Unspecified cerebral artery occlusion with cerebral infarction   UTI (urinary tract infection)   Ventricular fibrillation (H)   Ventricular tachyarrhythmia  (H)  Appendicitis  Panhypopituitarism (H)  Hematemesis  Sepsis (H)  Community acquired pneumonia  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)  Hyponatremia  PEG tube malfunction (H)  Cardiac arrest (H)  Acute respiratory failure with hypoxia (H)  Necrotizing pancreatitis  Pneumonia, aspiration (H)  Encephalopathy  Fever  Aspiration pneumonia (H)  Acid reflux    Past Surgical History:    right hand repair  Head and neck surgery;   vascular surgery;   Laparoscopic appendectomy (7/30/2013);   Esophagoscopy, gastroscopy, duodenoscopy (EGD), combined (3/13/2014);   Tracheostomy (N/A, 9/3/2016);   Laparoscopic assisted insertion tube gastrotomy (N/A, 9/7/2016);   Tracheostomy (N/A, 12/2/2016);   Esophagoscopy, gastroscopy, duodenoscopy (EGD), combined (N/A, 12/6/2016);   Endoscopic ultrasound upper gastrointestinal tract (GI) (N/A, 1/30/2017);  Endoscopic Ultrasound, Esophagoscopy, Gastroscopy, Duodenoscopy (EGD), Necrosectomy (N/A, 2/7/2017);  Esophagoscopy, gastroscopy, duodenoscopy (EGD), combined (N/A, 2/7/2017);   IR Gastro Jejunostomy Tube Change     Family History:    Cancer    Social History:  The patient  reports that he quit smoking about 30 years ago. He has never used smokeless tobacco. He reports that he does not drink alcohol or use drugs.   Marital Status:  Single [1]      Review of Systems   Unable to perform ROS: Patient nonverbal       Physical Exam     Vital signs  Patient Vitals for the past 24 hrs:   BP Temp Temp src Heart Rate Resp SpO2   05/16/19 2141 98/56 98.7  F (37.1  C) Oral 89 16 96 %          Physical Exam  General:  Aphasic middle aged gentlemen. Awake and interactive at his baseline showing no distress.      Eye:  Pupils are equal, round, and reactive.  Extraocular movements intact.    ENT:  No rhinorrhea.  Moist mucus membranes.  Normal tongue and tonsil.    Cardiac:  Regular rate and rhythm.  No murmurs, gallops, or rubs.    Pulmonary:  Clear to auscultation  bilaterally.  No wheezes, rales, or rhonchi.    Abdomen:  Positive bowel sounds.  Abdomen is soft and non-distended, without focal tenderness.    Musculoskeletal:  Marked contractures to the extremities without evidence of trauma.     Skin:  Warm and dry without rashes.    Neurologic:  Extremities are rigid. Patient is unable to otherwise participate.     Psychiatric:  Patient is aphasic but is able to nod and shake his head to simple questions.   Emergency Department Course   Imaging:  Chest XR,  PA & LAT   Final Result   IMPRESSION: Mild elevation right hemidiaphragm. Probably minimal   atelectasis at the bases. No consolidative infiltrates or other acute   findings. Normal heart size.      PANFILO KING MD        Results as read by radiology.   I communicated the results of the imaging studies with the patient's mother who expressed understanding of these findings.      Laboratory:  ISTAT gases lactate gabe POCT: pH Venous 7.40, PCO2 Venous 49, PO2 Venous 32, Bicarbonate Venous 30, O2 Sat Venous 61, Lactic Acid 1.4   CBC: HGB 13.2, RDW 15.8, Absolute Monocytes 1.4, otherwise normal   CMP: CO2 33, Albumin 3.0, otherwise normal     Interventions:  2235: NS 1L IV Bolus     Emergency Department Course:  Past medical records, nursing notes, and vitals reviewed.  2208: I performed an exam of the patient and obtained history, as documented above.    IV inserted and blood drawn for the above work up to be conducted.     The patient was sent for imaging studies (CXR) while in the emergency department, findings above.      0027: I rechecked the patient. Findings and plan explained to the patient's mother. Patient discharged home, status improved, with instructions regarding supportive care, medications, and reasons to return as well as the importance of close follow-up was reviewed.     Impression & Plan    Medical Decision Making:  Mr. Farias is a 58-year-old man well-known to our emergency department for issues of his  closed head injury with ongoing aspiration pneumonia and infections.  He was recently admitted to this hospital earlier in the month for aspiration pneumonia and improved with antibiotics and is still currently taking Augmentin.  He was discharged home 4 days ago, only to return to the emergency department yesterday when he was found to have a temperature of 100.1.  He underwent a thorough evaluation by the emergency physician at that time which did not show any evidence of infection was discharged.    The patient's PCA was getting the patient ready for bed tonight.  She checked the temperature and found it to be 99.6.  This concerned her and therefore she continued to check a temperature every 10 minutes and found that it elevated to 100.3.  She gave the patient a dose of Tylenol and then called an ambulance to bring him here.  She did not report any new obvious symptoms of infection, specifically that she did not believe that the patient has had increasing cough, increasing shortness of breath, vomiting, diarrhea, or any other rashes.    On my exam, this patient looks as he often does, awake, alert, smiling, but essentially a phasic.  I performed a head to toe physical exam and I do not see any evidence of acute infection.  When I ask him if he is having any pain he shakes his head no and does seem to understand my questioning.  Because of his complicated past history, labs were rechecked which show a normal white blood cell count, normal lactate, normal VBG, normal abdominal labs.  A repeat chest x-ray continues to show no evidence of new consolidation.  His oxygenation is normal and he is not coughing.    With this, I personally spoke with his mother who is his primary caregiver.  However, she recently underwent surgery and is at a nursing home, unable to be at home with him.  She feels that he is safe to return home considering there is no evidence of acute infection at this time.  We discussed standard  temperature curves and what to look for for signs of infection.  I was clear there should never be a hesitation to return to us if the patient is appearing worse in any way, having temperatures greater than 100.4, or if there are any other emergent concerns.    Diagnosis:    ICD-10-CM    1. Elevated temperature R50.9    2. History of aspiration pneumonia Z87.01        Disposition:  discharged to home      IAmy, am serving as a scribe at 12:30 AM on 5/17/2019 to document services personally performed by Trierweiler, Chad A, MD based on my observations and the provider's statements to me.     EMERGENCY DEPARTMENT       Trierweiler, Chad A, MD  05/17/19 2435

## 2019-05-17 NOTE — ED NOTES
Bed: ED27  Expected date: 5/16/19  Expected time: 9:35 PM  Means of arrival:   Comments:  E1 57M  Elevated temperature

## 2019-05-17 NOTE — DISCHARGE INSTRUCTIONS
Discharge Instructions  Fever    You have been seen today for a fever. Fever is a normal body reaction to illness or inflammation. Fever is a sign that your body is doing what it should to fight something off. Fever is not dangerous, but it can make you feel miserable, and you will probably feel better if you get your fever to go down. Most infections are caused by a virus, and antibiotics will not help; your provider will tell you whether antibiotics are needed in your case. At this time your provider does not find that your fever is a sign of anything dangerous or life-threatening.  However, sometimes the signs of serious illness do not show up right away so additional care may be necessary.    Generally, every Emergency Department visit should have a follow-up clinic visit with either a primary or a specialty clinic/provider. Please follow-up as instructed by your emergency provider today.    What can I do to help myself?  Fill any prescriptions the provider gave you and take them right away--especially antibiotics.  If you have a fever, get plenty of rest and drink lots of fluids, especially water.  What clothes or blankets you have on will not change your fever. Do what is comfortable for you.  Bathing or sponging in lukewarm water may help you feel better.  Tylenol  (acetaminophen), Motrin  (ibuprofen), or Advil  (ibuprofen) help bring fever down and may help you feel more comfortable. Be sure to read and follow the package directions, and ask your provider if you have questions.  Do not drink alcohol.    Return to the Emergency Department if:  Any of the symptoms you have get much worse.  You seem very sick, like being too weak to get up.  You have any new symptoms, especially serious things like abdominal (belly) pain or chest pain.  You are short of breath.  You have a severe headache.  You are vomiting (throwing up) so much you cannot keep fluids or medicines down.  You have confusion or seem unusually  drowsy.  You have a seizure.  You have anything else that worries you.  If you were given a prescription for medicine here today, be sure to read all of the information (including the package insert) that comes with your prescription.  This will include important information about the medicine, its side effects, and any warnings that you need to know about.  The pharmacist who fills the prescription can provide more information and answer questions you may have about the medicine.  If you have questions or concerns that the pharmacist cannot address, please call or return to the Emergency Department.   Remember that you can always come back to the Emergency Department if you are not able to see your regular provider in the amount of time listed above, if you get any new symptoms, or if there is anything that worries you.

## 2019-05-26 ENCOUNTER — APPOINTMENT (OUTPATIENT)
Dept: GENERAL RADIOLOGY | Facility: CLINIC | Age: 59
DRG: 871 | End: 2019-05-26
Attending: EMERGENCY MEDICINE
Payer: MEDICARE

## 2019-05-26 ENCOUNTER — HOSPITAL ENCOUNTER (INPATIENT)
Facility: CLINIC | Age: 59
LOS: 4 days | Discharge: HOME HEALTH | DRG: 871 | End: 2019-05-30
Attending: EMERGENCY MEDICINE | Admitting: HOSPITALIST
Payer: MEDICARE

## 2019-05-26 DIAGNOSIS — A41.9 SEPSIS (H): ICD-10-CM

## 2019-05-26 DIAGNOSIS — J18.9 PNEUMONIA OF BOTH LOWER LOBES DUE TO INFECTIOUS ORGANISM: ICD-10-CM

## 2019-05-26 DIAGNOSIS — N30.00 ACUTE CYSTITIS WITHOUT HEMATURIA: Primary | ICD-10-CM

## 2019-05-26 LAB
ALBUMIN SERPL-MCNC: 2.9 G/DL (ref 3.4–5)
ALBUMIN UR-MCNC: 10 MG/DL
ALP SERPL-CCNC: 97 U/L (ref 40–150)
ALT SERPL W P-5'-P-CCNC: 19 U/L (ref 0–70)
ANION GAP SERPL CALCULATED.3IONS-SCNC: 7 MMOL/L (ref 3–14)
APPEARANCE UR: CLEAR
AST SERPL W P-5'-P-CCNC: 17 U/L (ref 0–45)
BACTERIA #/AREA URNS HPF: ABNORMAL /HPF
BASOPHILS # BLD AUTO: 0.1 10E9/L (ref 0–0.2)
BASOPHILS NFR BLD AUTO: 0.4 %
BILIRUB SERPL-MCNC: 0.5 MG/DL (ref 0.2–1.3)
BILIRUB UR QL STRIP: NEGATIVE
BUN SERPL-MCNC: 12 MG/DL (ref 7–30)
CALCIUM SERPL-MCNC: 8.2 MG/DL (ref 8.5–10.1)
CHLORIDE SERPL-SCNC: 98 MMOL/L (ref 94–109)
CO2 SERPL-SCNC: 24 MMOL/L (ref 20–32)
COLOR UR AUTO: YELLOW
CREAT SERPL-MCNC: 0.78 MG/DL (ref 0.66–1.25)
DIFFERENTIAL METHOD BLD: ABNORMAL
EOSINOPHIL # BLD AUTO: 0.3 10E9/L (ref 0–0.7)
EOSINOPHIL NFR BLD AUTO: 2.2 %
ERYTHROCYTE [DISTWIDTH] IN BLOOD BY AUTOMATED COUNT: 15.2 % (ref 10–15)
GFR SERPL CREATININE-BSD FRML MDRD: >90 ML/MIN/{1.73_M2}
GLUCOSE SERPL-MCNC: 103 MG/DL (ref 70–99)
GLUCOSE UR STRIP-MCNC: NEGATIVE MG/DL
HCT VFR BLD AUTO: 36.2 % (ref 40–53)
HGB BLD-MCNC: 12 G/DL (ref 13.3–17.7)
HGB UR QL STRIP: NEGATIVE
IMM GRANULOCYTES # BLD: 0 10E9/L (ref 0–0.4)
IMM GRANULOCYTES NFR BLD: 0.3 %
KETONES UR STRIP-MCNC: NEGATIVE MG/DL
LACTATE BLD-SCNC: 2.2 MMOL/L (ref 0.7–2)
LEUKOCYTE ESTERASE UR QL STRIP: ABNORMAL
LYMPHOCYTES # BLD AUTO: 2.8 10E9/L (ref 0.8–5.3)
LYMPHOCYTES NFR BLD AUTO: 23.8 %
MCH RBC QN AUTO: 27.5 PG (ref 26.5–33)
MCHC RBC AUTO-ENTMCNC: 33.1 G/DL (ref 31.5–36.5)
MCV RBC AUTO: 83 FL (ref 78–100)
MONOCYTES # BLD AUTO: 1.5 10E9/L (ref 0–1.3)
MONOCYTES NFR BLD AUTO: 12.5 %
MUCOUS THREADS #/AREA URNS LPF: PRESENT /LPF
NEUTROPHILS # BLD AUTO: 7.2 10E9/L (ref 1.6–8.3)
NEUTROPHILS NFR BLD AUTO: 60.8 %
NITRATE UR QL: POSITIVE
NRBC # BLD AUTO: 0 10*3/UL
NRBC BLD AUTO-RTO: 0 /100
PH UR STRIP: 7.5 PH (ref 5–7)
PLATELET # BLD AUTO: 154 10E9/L (ref 150–450)
POTASSIUM SERPL-SCNC: 4.5 MMOL/L (ref 3.4–5.3)
PROT SERPL-MCNC: 7 G/DL (ref 6.8–8.8)
RBC # BLD AUTO: 4.37 10E12/L (ref 4.4–5.9)
RBC #/AREA URNS AUTO: 1 /HPF (ref 0–2)
SODIUM SERPL-SCNC: 129 MMOL/L (ref 133–144)
SOURCE: ABNORMAL
SP GR UR STRIP: 1.02 (ref 1–1.03)
UROBILINOGEN UR STRIP-MCNC: 4 MG/DL (ref 0–2)
WBC # BLD AUTO: 11.9 10E9/L (ref 4–11)
WBC #/AREA URNS AUTO: 25 /HPF (ref 0–5)

## 2019-05-26 PROCEDURE — 99285 EMERGENCY DEPT VISIT HI MDM: CPT | Mod: 25

## 2019-05-26 PROCEDURE — 85025 COMPLETE CBC W/AUTO DIFF WBC: CPT | Performed by: EMERGENCY MEDICINE

## 2019-05-26 PROCEDURE — 83605 ASSAY OF LACTIC ACID: CPT | Performed by: EMERGENCY MEDICINE

## 2019-05-26 PROCEDURE — 96375 TX/PRO/DX INJ NEW DRUG ADDON: CPT

## 2019-05-26 PROCEDURE — 87186 SC STD MICRODIL/AGAR DIL: CPT | Performed by: EMERGENCY MEDICINE

## 2019-05-26 PROCEDURE — 96365 THER/PROPH/DIAG IV INF INIT: CPT

## 2019-05-26 PROCEDURE — 71046 X-RAY EXAM CHEST 2 VIEWS: CPT

## 2019-05-26 PROCEDURE — 12000000 ZZH R&B MED SURG/OB

## 2019-05-26 PROCEDURE — 99223 1ST HOSP IP/OBS HIGH 75: CPT | Mod: AI | Performed by: HOSPITALIST

## 2019-05-26 PROCEDURE — 87086 URINE CULTURE/COLONY COUNT: CPT | Performed by: EMERGENCY MEDICINE

## 2019-05-26 PROCEDURE — 81001 URINALYSIS AUTO W/SCOPE: CPT | Performed by: EMERGENCY MEDICINE

## 2019-05-26 PROCEDURE — 87088 URINE BACTERIA CULTURE: CPT | Performed by: EMERGENCY MEDICINE

## 2019-05-26 PROCEDURE — 25800030 ZZH RX IP 258 OP 636: Performed by: EMERGENCY MEDICINE

## 2019-05-26 PROCEDURE — 25000125 ZZHC RX 250: Performed by: EMERGENCY MEDICINE

## 2019-05-26 PROCEDURE — 96361 HYDRATE IV INFUSION ADD-ON: CPT

## 2019-05-26 PROCEDURE — 80053 COMPREHEN METABOLIC PANEL: CPT | Performed by: EMERGENCY MEDICINE

## 2019-05-26 PROCEDURE — 87040 BLOOD CULTURE FOR BACTERIA: CPT | Performed by: EMERGENCY MEDICINE

## 2019-05-26 PROCEDURE — 25000128 H RX IP 250 OP 636: Performed by: EMERGENCY MEDICINE

## 2019-05-26 RX ORDER — SODIUM CHLORIDE, SODIUM LACTATE, POTASSIUM CHLORIDE, CALCIUM CHLORIDE 600; 310; 30; 20 MG/100ML; MG/100ML; MG/100ML; MG/100ML
INJECTION, SOLUTION INTRAVENOUS CONTINUOUS
Status: DISCONTINUED | OUTPATIENT
Start: 2019-05-26 | End: 2019-05-27

## 2019-05-26 RX ORDER — LIDOCAINE HYDROCHLORIDE 20 MG/ML
10 JELLY TOPICAL ONCE
Status: COMPLETED | OUTPATIENT
Start: 2019-05-26 | End: 2019-05-26

## 2019-05-26 RX ORDER — PIPERACILLIN SODIUM, TAZOBACTAM SODIUM 4; .5 G/20ML; G/20ML
4.5 INJECTION, POWDER, LYOPHILIZED, FOR SOLUTION INTRAVENOUS ONCE
Status: COMPLETED | OUTPATIENT
Start: 2019-05-26 | End: 2019-05-27

## 2019-05-26 RX ADMIN — HYDROCORTISONE SODIUM SUCCINATE 100 MG: 100 INJECTION, POWDER, FOR SOLUTION INTRAMUSCULAR; INTRAVENOUS at 23:39

## 2019-05-26 RX ADMIN — SODIUM CHLORIDE, POTASSIUM CHLORIDE, SODIUM LACTATE AND CALCIUM CHLORIDE 2448 ML: 600; 310; 30; 20 INJECTION, SOLUTION INTRAVENOUS at 23:44

## 2019-05-26 RX ADMIN — PIPERACILLIN SODIUM,TAZOBACTAM SODIUM 4.5 G: 4; .5 INJECTION, POWDER, FOR SOLUTION INTRAVENOUS at 23:40

## 2019-05-26 RX ADMIN — LIDOCAINE HYDROCHLORIDE 10 ML: 20 JELLY TOPICAL at 22:33

## 2019-05-26 ASSESSMENT — ENCOUNTER SYMPTOMS
SHORTNESS OF BREATH: 0
MYALGIAS: 0
NECK PAIN: 0
COUGH: 0
ARTHRALGIAS: 0

## 2019-05-27 LAB
ALBUMIN SERPL-MCNC: 2.9 G/DL (ref 3.4–5)
ALP SERPL-CCNC: 99 U/L (ref 40–150)
ALT SERPL W P-5'-P-CCNC: 18 U/L (ref 0–70)
ANION GAP SERPL CALCULATED.3IONS-SCNC: 4 MMOL/L (ref 3–14)
AST SERPL W P-5'-P-CCNC: 15 U/L (ref 0–45)
BASOPHILS # BLD AUTO: 0 10E9/L (ref 0–0.2)
BASOPHILS NFR BLD AUTO: 0.2 %
BILIRUB SERPL-MCNC: 0.8 MG/DL (ref 0.2–1.3)
BUN SERPL-MCNC: 10 MG/DL (ref 7–30)
CALCIUM SERPL-MCNC: 8.8 MG/DL (ref 8.5–10.1)
CHLORIDE SERPL-SCNC: 107 MMOL/L (ref 94–109)
CO2 SERPL-SCNC: 26 MMOL/L (ref 20–32)
CREAT SERPL-MCNC: 0.81 MG/DL (ref 0.66–1.25)
DIFFERENTIAL METHOD BLD: ABNORMAL
EOSINOPHIL # BLD AUTO: 0 10E9/L (ref 0–0.7)
EOSINOPHIL NFR BLD AUTO: 0.1 %
ERYTHROCYTE [DISTWIDTH] IN BLOOD BY AUTOMATED COUNT: 15.2 % (ref 10–15)
GFR SERPL CREATININE-BSD FRML MDRD: >90 ML/MIN/{1.73_M2}
GLUCOSE SERPL-MCNC: 128 MG/DL (ref 70–99)
HCT VFR BLD AUTO: 36.8 % (ref 40–53)
HGB BLD-MCNC: 12.4 G/DL (ref 13.3–17.7)
IMM GRANULOCYTES # BLD: 0 10E9/L (ref 0–0.4)
IMM GRANULOCYTES NFR BLD: 0.2 %
LDH SERPL L TO P-CCNC: 110 U/L (ref 85–227)
LYMPHOCYTES # BLD AUTO: 1.3 10E9/L (ref 0.8–5.3)
LYMPHOCYTES NFR BLD AUTO: 10.2 %
MCH RBC QN AUTO: 27.8 PG (ref 26.5–33)
MCHC RBC AUTO-ENTMCNC: 33.7 G/DL (ref 31.5–36.5)
MCV RBC AUTO: 83 FL (ref 78–100)
MONOCYTES # BLD AUTO: 1.1 10E9/L (ref 0–1.3)
MONOCYTES NFR BLD AUTO: 8.6 %
NEUTROPHILS # BLD AUTO: 10.1 10E9/L (ref 1.6–8.3)
NEUTROPHILS NFR BLD AUTO: 80.7 %
NRBC # BLD AUTO: 0 10*3/UL
NRBC BLD AUTO-RTO: 0 /100
PLATELET # BLD AUTO: 161 10E9/L (ref 150–450)
POTASSIUM SERPL-SCNC: 4.5 MMOL/L (ref 3.4–5.3)
PROCALCITONIN SERPL-MCNC: <0.05 NG/ML
PROT SERPL-MCNC: 7.5 G/DL (ref 6.8–8.8)
RBC # BLD AUTO: 4.46 10E12/L (ref 4.4–5.9)
SODIUM SERPL-SCNC: 137 MMOL/L (ref 133–144)
WBC # BLD AUTO: 12.5 10E9/L (ref 4–11)

## 2019-05-27 PROCEDURE — 40000556 ZZH STATISTIC PERIPHERAL IV START W US GUIDANCE

## 2019-05-27 PROCEDURE — 94640 AIRWAY INHALATION TREATMENT: CPT | Mod: 76

## 2019-05-27 PROCEDURE — 85025 COMPLETE CBC W/AUTO DIFF WBC: CPT | Performed by: HOSPITALIST

## 2019-05-27 PROCEDURE — 25000128 H RX IP 250 OP 636: Performed by: HOSPITALIST

## 2019-05-27 PROCEDURE — 80053 COMPREHEN METABOLIC PANEL: CPT | Performed by: HOSPITALIST

## 2019-05-27 PROCEDURE — 36415 COLL VENOUS BLD VENIPUNCTURE: CPT | Performed by: EMERGENCY MEDICINE

## 2019-05-27 PROCEDURE — 84145 PROCALCITONIN (PCT): CPT | Performed by: HOSPITALIST

## 2019-05-27 PROCEDURE — 40000275 ZZH STATISTIC RCP TIME EA 10 MIN

## 2019-05-27 PROCEDURE — 27210429 ZZH NUTRITION PRODUCT INTERMEDIATE LITER

## 2019-05-27 PROCEDURE — 25000132 ZZH RX MED GY IP 250 OP 250 PS 637: Performed by: INTERNAL MEDICINE

## 2019-05-27 PROCEDURE — 12000000 ZZH R&B MED SURG/OB

## 2019-05-27 PROCEDURE — A9270 NON-COVERED ITEM OR SERVICE: HCPCS | Performed by: INTERNAL MEDICINE

## 2019-05-27 PROCEDURE — 25000128 H RX IP 250 OP 636: Performed by: INTERNAL MEDICINE

## 2019-05-27 PROCEDURE — 25000125 ZZHC RX 250: Performed by: HOSPITALIST

## 2019-05-27 PROCEDURE — 40000257 ZZH STATISTIC CONSULT NO CHARGE VASC ACCESS

## 2019-05-27 PROCEDURE — 83615 LACTATE (LD) (LDH) ENZYME: CPT | Performed by: HOSPITALIST

## 2019-05-27 PROCEDURE — 25800030 ZZH RX IP 258 OP 636: Performed by: HOSPITALIST

## 2019-05-27 PROCEDURE — 94640 AIRWAY INHALATION TREATMENT: CPT

## 2019-05-27 PROCEDURE — 87040 BLOOD CULTURE FOR BACTERIA: CPT | Performed by: EMERGENCY MEDICINE

## 2019-05-27 PROCEDURE — 36415 COLL VENOUS BLD VENIPUNCTURE: CPT | Performed by: HOSPITALIST

## 2019-05-27 RX ORDER — HYDROCORTISONE 20 MG/1
20 TABLET ORAL EVERY MORNING
Status: DISCONTINUED | OUTPATIENT
Start: 2019-05-27 | End: 2019-05-30 | Stop reason: HOSPADM

## 2019-05-27 RX ORDER — SODIUM CHLORIDE 9 MG/ML
INJECTION, SOLUTION INTRAVENOUS CONTINUOUS
Status: DISCONTINUED | OUTPATIENT
Start: 2019-05-27 | End: 2019-05-28

## 2019-05-27 RX ORDER — HYDROCORTISONE 10 MG/1
10 TABLET ORAL
Status: DISCONTINUED | OUTPATIENT
Start: 2019-05-27 | End: 2019-05-30 | Stop reason: HOSPADM

## 2019-05-27 RX ORDER — PIPERACILLIN SODIUM, TAZOBACTAM SODIUM 4; .5 G/20ML; G/20ML
4.5 INJECTION, POWDER, LYOPHILIZED, FOR SOLUTION INTRAVENOUS EVERY 6 HOURS
Status: DISCONTINUED | OUTPATIENT
Start: 2019-05-27 | End: 2019-05-29

## 2019-05-27 RX ORDER — METOCLOPRAMIDE HYDROCHLORIDE 5 MG/5ML
10 SOLUTION ORAL
Status: DISCONTINUED | OUTPATIENT
Start: 2019-05-27 | End: 2019-05-30 | Stop reason: HOSPADM

## 2019-05-27 RX ORDER — GLYCOPYRROLATE 1 MG/1
1-2 TABLET ORAL 2 TIMES DAILY PRN
Status: DISCONTINUED | OUTPATIENT
Start: 2019-05-27 | End: 2019-05-30 | Stop reason: HOSPADM

## 2019-05-27 RX ORDER — ACETAMINOPHEN 325 MG/1
650 TABLET ORAL EVERY 4 HOURS PRN
Status: DISCONTINUED | OUTPATIENT
Start: 2019-05-27 | End: 2019-05-27

## 2019-05-27 RX ORDER — ACETAMINOPHEN 650 MG/1
650 SUPPOSITORY RECTAL EVERY 4 HOURS PRN
Status: DISCONTINUED | OUTPATIENT
Start: 2019-05-27 | End: 2019-05-30 | Stop reason: HOSPADM

## 2019-05-27 RX ORDER — ONDANSETRON 2 MG/ML
4 INJECTION INTRAMUSCULAR; INTRAVENOUS EVERY 6 HOURS PRN
Status: DISCONTINUED | OUTPATIENT
Start: 2019-05-27 | End: 2019-05-30 | Stop reason: HOSPADM

## 2019-05-27 RX ORDER — POLYETHYLENE GLYCOL 3350 17 G/17G
17 POWDER, FOR SOLUTION ORAL DAILY PRN
Status: DISCONTINUED | OUTPATIENT
Start: 2019-05-27 | End: 2019-05-30 | Stop reason: HOSPADM

## 2019-05-27 RX ORDER — IPRATROPIUM BROMIDE AND ALBUTEROL SULFATE 2.5; .5 MG/3ML; MG/3ML
3 SOLUTION RESPIRATORY (INHALATION) 4 TIMES DAILY
Status: DISCONTINUED | OUTPATIENT
Start: 2019-05-27 | End: 2019-05-30 | Stop reason: HOSPADM

## 2019-05-27 RX ORDER — ALBUTEROL SULFATE 0.83 MG/ML
3 SOLUTION RESPIRATORY (INHALATION)
Status: DISCONTINUED | OUTPATIENT
Start: 2019-05-27 | End: 2019-05-30 | Stop reason: HOSPADM

## 2019-05-27 RX ORDER — LANOLIN ALCOHOL/MO/W.PET/CERES
3 CREAM (GRAM) TOPICAL
Status: DISCONTINUED | OUTPATIENT
Start: 2019-05-27 | End: 2019-05-30 | Stop reason: HOSPADM

## 2019-05-27 RX ORDER — ONDANSETRON 4 MG/1
4 TABLET, ORALLY DISINTEGRATING ORAL EVERY 6 HOURS PRN
Status: DISCONTINUED | OUTPATIENT
Start: 2019-05-27 | End: 2019-05-30 | Stop reason: HOSPADM

## 2019-05-27 RX ORDER — BISACODYL 10 MG
10 SUPPOSITORY, RECTAL RECTAL DAILY PRN
Status: DISCONTINUED | OUTPATIENT
Start: 2019-05-27 | End: 2019-05-30 | Stop reason: HOSPADM

## 2019-05-27 RX ORDER — AMOXICILLIN 250 MG
2 CAPSULE ORAL 2 TIMES DAILY PRN
Status: DISCONTINUED | OUTPATIENT
Start: 2019-05-27 | End: 2019-05-27

## 2019-05-27 RX ORDER — LIDOCAINE 40 MG/G
CREAM TOPICAL
Status: DISCONTINUED | OUTPATIENT
Start: 2019-05-27 | End: 2019-05-30 | Stop reason: HOSPADM

## 2019-05-27 RX ORDER — GUAR GUM
1 PACKET (EA) ORAL DAILY
Status: DISCONTINUED | OUTPATIENT
Start: 2019-05-27 | End: 2019-05-30 | Stop reason: HOSPADM

## 2019-05-27 RX ORDER — AMOXICILLIN 250 MG
1 CAPSULE ORAL 2 TIMES DAILY PRN
Status: DISCONTINUED | OUTPATIENT
Start: 2019-05-27 | End: 2019-05-27

## 2019-05-27 RX ORDER — NALOXONE HYDROCHLORIDE 0.4 MG/ML
.1-.4 INJECTION, SOLUTION INTRAMUSCULAR; INTRAVENOUS; SUBCUTANEOUS
Status: DISCONTINUED | OUTPATIENT
Start: 2019-05-27 | End: 2019-05-30 | Stop reason: HOSPADM

## 2019-05-27 RX ADMIN — IPRATROPIUM BROMIDE AND ALBUTEROL SULFATE 3 ML: .5; 3 SOLUTION RESPIRATORY (INHALATION) at 19:36

## 2019-05-27 RX ADMIN — Medication 1 PACKET: at 13:19

## 2019-05-27 RX ADMIN — HYDROCORTISONE SODIUM SUCCINATE 25 MG: 100 INJECTION, POWDER, FOR SOLUTION INTRAMUSCULAR; INTRAVENOUS at 06:43

## 2019-05-27 RX ADMIN — HYDROCORTISONE 10 MG: 10 TABLET ORAL at 18:17

## 2019-05-27 RX ADMIN — IPRATROPIUM BROMIDE AND ALBUTEROL SULFATE 3 ML: .5; 3 SOLUTION RESPIRATORY (INHALATION) at 12:22

## 2019-05-27 RX ADMIN — Medication 40 MG: at 11:04

## 2019-05-27 RX ADMIN — PIPERACILLIN SODIUM,TAZOBACTAM SODIUM 4.5 G: 4; .5 INJECTION, POWDER, FOR SOLUTION INTRAVENOUS at 06:43

## 2019-05-27 RX ADMIN — IPRATROPIUM BROMIDE AND ALBUTEROL SULFATE 3 ML: .5; 3 SOLUTION RESPIRATORY (INHALATION) at 16:29

## 2019-05-27 RX ADMIN — METOCLOPRAMIDE 10 MG: 5 SOLUTION ORAL at 18:16

## 2019-05-27 RX ADMIN — METOCLOPRAMIDE 10 MG: 5 SOLUTION ORAL at 13:18

## 2019-05-27 RX ADMIN — SODIUM CHLORIDE: 9 INJECTION, SOLUTION INTRAVENOUS at 18:15

## 2019-05-27 RX ADMIN — SODIUM CHLORIDE: 9 INJECTION, SOLUTION INTRAVENOUS at 02:31

## 2019-05-27 RX ADMIN — LEVOTHYROXINE SODIUM 137 MCG: 112 TABLET ORAL at 11:04

## 2019-05-27 RX ADMIN — HYDROCORTISONE 20 MG: 20 TABLET ORAL at 11:04

## 2019-05-27 RX ADMIN — ENOXAPARIN SODIUM 40 MG: 40 INJECTION SUBCUTANEOUS at 13:18

## 2019-05-27 RX ADMIN — PIPERACILLIN SODIUM,TAZOBACTAM SODIUM 4.5 G: 4; .5 INJECTION, POWDER, FOR SOLUTION INTRAVENOUS at 18:17

## 2019-05-27 RX ADMIN — IPRATROPIUM BROMIDE AND ALBUTEROL SULFATE 3 ML: .5; 3 SOLUTION RESPIRATORY (INHALATION) at 09:05

## 2019-05-27 ASSESSMENT — ACTIVITIES OF DAILY LIVING (ADL)
ADLS_ACUITY_SCORE: 45
FALL_HISTORY_WITHIN_LAST_SIX_MONTHS: NO
ADLS_ACUITY_SCORE: 45
ADLS_ACUITY_SCORE: 45

## 2019-05-27 NOTE — PHARMACY-ADMISSION MEDICATION HISTORY
Admission medication history interview status for the 5/26/2019  admission is complete. See EPIC admission navigator for prior to admission medications     Medication history source reliability:Poor    Actions taken by pharmacist (provider contacted, etc): Medication list taken from discharge summary from early 5/9/19.     Additional medication history information not noted on PTA med list : last doses unknown as care giver also hospitalized and now in care facility.    Medication reconciliation/reorder completed by provider prior to medication history? Yes    Time spent in this activity: 20 minutes    Prior to Admission medications    Medication Sig Last Dose Taking? Auth Provider   acetaminophen (TYLENOL) 500 MG tablet 1,000 mg by Oral or FT or NG tube route every 6 hours as needed for mild pain  Yes Unknown, Entered By History   acetylcysteine (MUCOMYST) 20 % neb solution Take 2 mLs by nebulization 4 times daily  Patient taking differently: Take 2 mLs by nebulization 4 times daily With Albuterol at 07:00, 11:00, 15:00, 19:00  Yes Starr Champion MD   albuterol (PROVENTIL) (5 MG/ML) 0.5% neb solution Take 2.5 mg by nebulization 4 times daily 0700 1100 1500 1900 with mucomyst  Yes Unknown, Entered By History   bacitracin ointment Apply topically daily as needed for wound care To PEG site.   Yes Unknown, Entered By History   Brivaracetam (BRIVIACT) 10 MG/ML solution 100 mg by Oral or FT or NG tube route 2 times daily @0900 and 2100  Yes Reported, Patient   calcium carbonate 1250 (500 CA) MG/5ML SUSP suspension 5 mLs (1,250 mg) by Per J Tube route 3 times daily (with meals)  Yes Mariana Venegas MD   carBAMazepine (TEGRETOL) 100 MG/5ML suspension Take 150 mg by mouth every 6 hours At 06:00, 12:00, 18:00 and 24:00 for seizures  Yes Unknown, Entered By History   ferrous sulfate 220 (44 Fe) MG/5ML ELIX 220 mg by Per Feeding Tube route daily  Yes Unknown, Entered By History   glycopyrrolate (GLYCATE) 2 MG tablet  Take 1-2 mg by mouth 2 times daily as needed (secretions) 1/2 to 1 tablet (1 - 2 mg) up to twice daily if needed for secretions.  Yes Unknown, Entered By History   hydrocortisone (CORTEF) 5 MG tablet 10 mg by Oral or FT or NG tube route daily (with dinner) At 1500  Yes Unknown, Entered By History   hydrocortisone (CORTEF) 5 MG tablet 20 mg by Oral or FT or NG tube route every morning   Yes Unknown, Entered By History   levothyroxine (SYNTHROID/LEVOTHROID) 137 MCG tablet 137 mcg by Oral or FT or NG tube route daily @ 05:00  Yes Unknown, Entered By History   melatonin (MELATONIN) 1 MG/ML LIQD liquid 6 mg by Per NG tube route At Bedtime   Yes Unknown, Entered By History   Multiple Vitamins-Minerals (CENTRUM SILVER) per tablet Take 1 tablet by mouth daily Crush and feed via j-tube  Yes Unknown, Entered By History   pantoprazole (PROTONIX) 2 mg/mL SUSP suspension 20 mLs (40 mg) by Per J Tube route daily  Yes Washington Connors MD   potassium & sodium phosphates (NEUTRA-PHOS) 280-160-250 MG Packet Take 1 packet by mouth 3 times daily 0900, 1500, 2100.   Yes Unknown, Entered By History   testosterone cypionate (DEPOTESTOTERONE CYPIONATE) 200 MG/ML injection Inject 76 mg into the muscle See Admin Instructions Every 2 weeks on Fridays   76 mg or 0.38 mL  Yes Unknown, Entered By History   Vitamin D, Cholecalciferol, 1000 units TABS Take 2,000 Units by mouth every morning Crush and feed via j-tube  Yes Unknown, Entered By History   Guar Gum (FIBER MODULAR, NUTRISOURCE FIBER,) packet 1 packet by Per G Tube route daily Unknown at Unknown time  Starr Champion MD   order for DME Equipment being ordered: Nebulizer   Starr Champion MD Meghan Lee, PharmD, BCPS

## 2019-05-27 NOTE — ED NOTES
"[facility]  ED Nurse Handoff Report    ED Chief complaint: Fever      ED Diagnosis:   Final diagnoses:   Sepsis (H)       Code Status: see admitting MD    Allergies:   Allergies   Allergen Reactions     Dilantin [Phenytoin Sodium]      Valproic Acid      Toxicity c bone marrow suspension, elevated ammonia levels        Activity level - Baseline/Home:  Total Care    Activity Level - Current:   Total Care     Needed?: No    Isolation: Yes  Infection: Not Applicable  MRSA  Bariatric?: No    Vital Signs:   Vitals:    05/26/19 2140   BP: 103/59   Resp: 18   Temp: 100.2  F (37.9  C)   TempSrc: Rectal   SpO2: 95%   Weight: 81.6 kg (180 lb)       Cardiac Rhythm: ,        Pain level: 0-10 Pain Scale: 0    Is this patient confused?: Yes   Does this patient have a guardian?  Yes         If yes, is there guardianship documents in the Epic \"Code/ACP\" activity?  Yes         Guardian Notified?  Yes  [place] - Suicide Severity Rating Scale Completed?  Yes  If yes, what color did the patient score?  White    Patient Report: Initial Complaint: 58 year old male presents to the ED with complaint of fever.  Focused Assessment: pt had a rectal temp of 100.2. Pt has a hx of TBI and is not very verbal.   Tests Performed: chest xray and labs  Abnormal Results:   Abnormal Labs Reviewed   COMPREHENSIVE METABOLIC PANEL - Abnormal; Notable for the following components:       Result Value    Sodium 129 (*)     Glucose 103 (*)     Calcium 8.2 (*)     Albumin 2.9 (*)     All other components within normal limits   LACTIC ACID WHOLE BLOOD - Abnormal; Notable for the following components:    Lactic Acid 2.2 (*)     All other components within normal limits   ROUTINE UA WITH MICROSCOPIC - Abnormal; Notable for the following components:    pH Urine 7.5 (*)     Protein Albumin Urine 10 (*)     Urobilinogen mg/dL 4.0 (*)     Nitrite Urine Positive (*)     Leukocyte Esterase Urine Moderate (*)     WBC Urine 25 (*)     " Bacteria Urine Few (*)     Mucous Urine Present (*)     All other components within normal limits   CBC WITH PLATELETS DIFFERENTIAL - Abnormal; Notable for the following components:    WBC 11.9 (*)     RBC Count 4.37 (*)     Hemoglobin 12.0 (*)     Hematocrit 36.2 (*)     RDW 15.2 (*)     Absolute Monocytes 1.5 (*)     All other components within normal limits     Treatments provided: 2448 mL LR bolus, 1L LR at 150/hr pending, zosyn 4.5 mg, solucortef 100 mg    Family Comments: none    OBS brochure/video discussed/provided to patient/family: No              Name of person given brochure if not patient: na              Relationship to patient: na    ED Medications:   Medications   lactated ringers infusion (has no administration in time range)   piperacillin-tazobactam (ZOSYN) 4.5 g vial to attach to  mL bag (4.5 g Intravenous New Bag 5/26/19 2340)   lidocaine (XYLOCAINE) 2 % external gel 10 mL (10 mLs Urethral Given 5/26/19 2233)   lactated ringers BOLUS 2,448 mL (2,448 mLs Intravenous New Bag 5/26/19 2344)   hydrocortisone sodium succinate PF (solu-CORTEF) injection 100 mg (100 mg Intravenous Given 5/26/19 2339)       Drips infusing?:  Yes    For the majority of the shift this patient was Yellow.   Interventions performed were comforting pt.    Severe Sepsis OR Septic Shock Diagnosis Present:   Yes    Per the ED Provider, Time Zero for severe sepsis or septic shock is: 2340    3 Hour Severe Sepsis Bundle Completion:  1. Initial Lactic Acid Result:   Recent Labs   Lab Test 05/26/19 2218 05/16/19  2236 05/06/19  2257   LACT 2.2* 1.4 1.8     2. Blood Cultures before Antibiotics: Yes  3. Broad Spectrum Antibiotics Administered:     Anti-infectives (From now, onward)    Start     Dose/Rate Route Frequency Ordered Stop    05/26/19 2334  piperacillin-tazobactam (ZOSYN) 4.5 g vial to attach to  mL bag      4.5 g  over 30 Minutes Intravenous ONCE 05/26/19 2333          4. 200 ml of IV fluids have been given so  far      6 Hour Severe Sepsis Bundle Completion:    1. Repeat Lactic Acid Level: Not drawn  2. Patient currently on Vasopressors =  No    To be done/followed up on inpatient unit:  1L LR at 150/hr pending, second set of cultures put in as lab collect and repeat latic pending after fluid therapy, which is just started a few minutes ago.    ED NURSE PHONE NUMBER: [phone]

## 2019-05-27 NOTE — PROGRESS NOTES
DATE & TIME: 5/27 1727     ORIENTATION: HECTOR, Nonverbal   BEHAVIOR & AGGRESSION TOOL COLOR: Green  CIWA SCORE: N/A  ABNL VS/O2: VSS on RA  MOBILITY: Ax2 with lift, turn and repo  PAIN MANAGMENT: PRN tylenol if needed  DIET: NPO, TF  BOWEL/BLADDER: Inc, external cath  ABNL LAB/BG: WBC 12.5  DRAIN/DEVICES: external cath, G/J tube  TELEMETRY RHYTHM: no order  SKIN: coccyx pink, skin tear, bruises  TESTS/PROCEDURES: None  D/C DAY/GOALS/PLACE: pending  OTHER IMPORTANT INFO:

## 2019-05-27 NOTE — ED PROVIDER NOTES
History     Chief Complaint:  Fever    HPI   History obtained from patient who is nonverbal, but is able to nod yes or no.     Keyon Farias is a 58 year old male who presents with a fever. The patient's caretaker at home measured a temperature of 100.3 and called for EMS transport to the ED. He denies cough, pains, or shortness of breath, but this is somewhat unclear.     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  Aspiration pneumonia  DVT of upper extremity  GERD  Necrotizing pancreatitis  Panhypopituitarism  Pneumonia  Seizures  Septic shock  Spastic hemiplegia affecting dominant side  Thyroid disease  Unspecified cerebral artery occlusion with cerebral infarction  Ventricular fibrillation  Ventricular tachyarrhythmia  Cardiac arrest  Panhypopituitarism     Past Surgical History:    Endoscopic upper GI ultrasound  EGD x 3  Reconstructive facial surgery  IR jejunostomy tube change   Appendectomy  Right hand repair  Tracheostomy x 2    Family History:    Cancer    Social History:  Smoking status: Former (quit '89)  Alcohol use: No  Drug use: No  Patient presents alone.  PCP: Carlos Gomez    Marital Status:  Single      Review of Systems   Respiratory: Negative for cough and shortness of breath.    Cardiovascular: Negative for chest pain.   Musculoskeletal: Negative for arthralgias, myalgias and neck pain.   10 point review of systems performed and is negative except as above and in HPI.     Physical Exam     Patient Vitals for the past 24 hrs:   BP Temp Temp src Heart Rate Resp SpO2 Weight   05/26/19 2140 103/59 100.2  F (37.9  C) Rectal 85 18 95 % 81.6 kg (180 lb)      Physical Exam  General: Resting on the gurney.   Head:  The scalp,  face, and head appear normal  Mouth/Throat: Mucus membranes are dry  CV:  Regular rate    Normal S1 and S2  No pathological murmur   Resp:  Breath sounds clear and equal bilaterally     Non-labored, no retractions or accessory muscle use    No coarseness     No wheezing   GI:  Abdomen is soft, no rigidity    No tenderness to palpation  MS:  Good capillary refill noted.    Extremities are contracted without evidence of trauma.  Skin:  No rash or lesions noted.  Neuro:  Patient is aphasic, but occasionally able to nod his head to answer questions.   Psych:  Awake. Alert. .      Appropriate interactions at his baseline.         Emergency Department Course     Imaging:  Radiographic findings were communicated with the admitting MD who voiced understanding of the findings.    X-ray Chest, 2 views:  Increased hazy consolidation in the right lower lung  concerning for pneumonia. There are small bilateral pleural effusions  which may be slightly increased since prior. Lung volumes remain low.  Cardiac silhouette is unchanged.    Result per radiology.     Laboratory:  CMP: Sodium 129 (L), Glucose 103 (H), Calcium 8.2 (L), Albumin 2.9 (L), o/w WNL (Creatinine 0.78)  Lactic acid: 2.2 (H)  Blood Culture x2: Pending     UA: pH 7.5 (H), Albumin 10, Urobilinogen 4 (H), Nitrite positive, Leukocyte esterase moderate, WBC 25 (H), Bacteria few, Mucous present, o/w Negative   Urine Culture: Pending    Interventions:  Medications   lidocaine (XYLOCAINE) 2 % external gel 10 mL (10 mLs Urethral Given 5/26/19 2233)   2233 - Lidocaine 2 % 10 ml urethral     Emergency Department Course:  The patient arrived in the emergency department via EMS.    Past medical records, nursing notes, and vitals reviewed.  2142: I performed an exam of the patient and obtained history, as documented above.     IV inserted and blood drawn. This was sent to laboratory for testing, findings above.  The patient provided a urine sample here in the emergency  department. This was sent for laboratory testing, findings above. The patient was sent for a chest x-ray while in the emergency department, findings above.     2320: I rechecked the patient. Explained findings to patient.     Impression & Plan      Medical Decision Making:  Keyon Farias is a 58 year old male who is well-known to this facility and presents for evaluation of fever.  Patient has had multiple admissions for aspiration pneumonia and sepsis in the past.  He has had several recent visits without an infectious source being found.  Today he has apparent aspiration pneumonia on his x-ray as well as evidence of a UTI.  Blood cultures and urine culture were obtained.  The patient has had sepsis in the past and often becomes hypotensive quite rapidly.  He does have adrenal insufficiency and therefore was given a steroid burst, fluids, and IV antibiotics.  He will be admitted to the hospitalist for further management and care.    Diagnosis:    ICD-10-CM    1. Sepsis (H) A41.9        Disposition:  Admit      Rikki Landeros  5/26/2019    EMERGENCY DEPARTMENT    Scribe Disclosure:  I, Rikki Landeros, am serving as a scribe at 9:42 PM on 5/26/2019 to document services personally performed by Monica Dawson MD based on my observations and the provider's statements to me.        Monica Dawson MD  05/26/19 5685

## 2019-05-27 NOTE — H&P
[facility]    History and Physical  Hospitalist       Date of Admission:  5/26/2019  Date of Service (when I saw the patient): 05/26/19    ASSESSMENT  Keyon Farias is a 58 year old gentleman with past medical history most notable for TBI leading to spastic hemiplegia as well as panhypopituitarism, seizure disorder, chronic dysphagia status post PEG tube placement, prior necrotizing pancreatitis with pseudocyst and multiple episodes of sepsis due to recurrent aspiration pneumonia who presents with recurrent fever and found to have suspected recurrent aspiration pneumonia causing severe sepsis.    PLAN    1) Suspected recurrent aspiration pneumonia causing severe sepsis: He has been hospitalized here 9 times since 12/2018 for recurrent sepsis due to recurrent aspiration pneumonia. Blood cultures once from 1/2019 and again 2/2019 were positive for different types of coagulase negative Staph organisms, likely contaminants; no subsequent blood cultures have been positive for pathogens. TTE 4/2019 showed preserved LVEF without wall motion abnormalities. Most recently he was hospitalized a tenth time from 5/6 through 5/9/2019 for right lower lobe aspiration pneumonia, seen on a CT; tube feeds were held and he was given Zosyn, transitioned to Augmentin. Tube feeds were resumed and he was discharged back to his facility.     Now he presents for recurrent cough and fever and CXR shows possible increase in size of his right lower lobe infiltrate. His WBC have risen since discharge and Lactic acid is elevated; thus he could have early onset severe sepsis due to recurrent aspiration pneumonia.    -- Inpatient. Strict NPO. Continue Zosyn empirically for now. Follow up cultures and check Pro-calcitonin.Duonebs scheduled q4 hours, Albuterol nebs every 2 hours as needed.   Monitor oxygenation.       -- Consider further care goal discussions with his legal guardain when/as able    2) Pan-hypopituitarism: We will  treat empirically for relative adrenal insufficiency.     -- Solu-Cortef IV 25 mg every 6 hours for two further doses and then reassess for any further needs; resume home PO Hydrocortisone when verified    3) Hyponatremia: Possibly pre-renal, vs SIADH: IV  ml/hour ordered and repeat BMP in AM.     3) TBI, spastic hemiplegia, severe dysphagia:     -- Fall precautions     -- Nutrition services consulted. Tube feeds on hold for now pending resolution of aspiration.     -- Maintain indwelling Lerma catheter    4) Enlarging pancreatic tail cyst: Reportedly in 2017 he had necrotizing pancreatitis requiring cystgastrostomy with stent placement and subsequent removal (as per EUS/EGD 1/2017 and 2/2017). Prior EGD 2014 had shown gastritis and esophagitis. More recently on 5/2018 follow up CT noted a cyst in the pancreatic tail. At his most recent hospitalization CT, this cyst was seen to have grown to 3.6 cm (prior size not noted in the Radiology read). This could be a cystic malignancy given recent increase in size, vs more benign type of cyst.      -- Some form of biopsy of this cyst could be considered if in keeping with his guardian's care goals; here or as an outpatient       -- Hold outpatient PPI for now as this medication may increase aspiration risk    5) Seizure disorder: Resume Tegretol when verified    6.  Hypothyroidism.  Resume Synthroid when verified     7.  Hypogonadotropic hypogonadism.  Resume PTA Testosterone injection at discharge    Chief Complaint   Fever    Unable to obtain a history from the patient due to mental status; history obtained from the ED physician whom I have spoken with    History of Present Illness   Keyon Farias is a 58 year old gentleman who presents with fever documented at home by his home caregiver earlier today, associated with cough. He has been seen recently twice in the ED for similar presentations. His low grade fever seems to be constant since onset here in the ED. He is  not verbal at baseline and caregiver no longer available for further history discussion.     In the ED, Blood pressure 103/59, temperature 100.2  F (37.9  C), temperature source Rectal, resp. rate 18, weight 81.6 kg (180 lb), SpO2 95 %.    CBC showed WBC 12, HGB 12, and . Lactate was 2.2. CMP was notable for Na 129, Ca 8.2, Alb 2.9, otherwise in normal reference range. Catheterized UA showed 25 WBC, 1 RBC. Blood an durine cultures were sent. CXR showed possible increase in size of the hazy right lower lobe consolidation seen on 5/16/2019; today's CXR also showed small bilateral pleural effusions. He was given 100 mg Solu Cortef, Zosyn, and IV fluid.    PHYSICAL EXAM  Blood pressure 103/59, temperature 100.2  F (37.9  C), temperature source Rectal, resp. rate 18, weight 81.6 kg (180 lb), SpO2 95 %.  Constitutional: Somnolent but arousable; no apparent distress  HEENT: normocephalic dry mucus membranes  Respiratory: lungs diminished to auscultation bilaterally  Cardiovascular: regular S1 S2   GI: abdomen soft non tender non distended bowel sounds positive  Lymph/Hematologic: pale, no cervical lymphadenopathy  Skin: no rash, good turgor  Musculoskeletal: no clubbing, cyanosis or edema  Neurologic: extra-ocular muscles intact; contractions present in extremities     DVT Prophylaxis: Pneumatic Compression Devices  Code Status: Full Code presumed    Disposition: Expected discharge in several days    Tyler Garcia MD    Past Medical History    I have reviewed this patient's medical history and updated it with pertinent information if needed.   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      Pneumonia       Seizures (H)     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     Septic shock (H)      Spastic hemiplegia affecting dominant side (H)     related to wil injury     Thyroid disease      Tracheostomy care (H)      Traumatic brain injury (H) 1989     Unspecified cerebral artery occlusion with cerebral infarction 1989     UTI (urinary tract infection)      Ventricular fibrillation (H)      Ventricular tachyarrhythmia (H)        Past Surgical History   I have reviewed this patient's surgical history and updated it with pertinent information if needed.  Past Surgical History:   Procedure Laterality Date     ENDOSCOPIC ULTRASOUND UPPER GASTROINTESTINAL TRACT (GI) N/A 1/30/2017    Procedure: ENDOSCOPIC ULTRASOUND, ESOPHAGOSCOPY / UPPER GASTROINTESTINAL TRACT (GI);  Surgeon: Jus Montana MD;  Location: [facility] OR     ENDOSCOPIC ULTRASOUND, ESOPHAGOSCOPY, GASTROSCOPY, DUODENOSCOPY (EGD), NECROSECTOMY N/A 2/7/2017    Procedure: ENDOSCOPIC ULTRASOUND, ESOPHAGOSCOPY, GASTROSCOPY, DUODENOSCOPY (EGD), NECROSECTOMY;  Surgeon: Jack Marcus MD;  Location:  OR     ESOPHAGOSCOPY, GASTROSCOPY, DUODENOSCOPY (EGD), COMBINED  3/13/2014    Procedure: COMBINED ESOPHAGOSCOPY, GASTROSCOPY, DUODENOSCOPY (EGD), BIOPSY SINGLE OR MULTIPLE;  gastroscopy;  Surgeon: Digna Rhodes MD;  Location: [facility]     ESOPHAGOSCOPY, GASTROSCOPY, DUODENOSCOPY (EGD), COMBINED N/A 12/6/2016    Procedure: COMBINED ESOPHAGOSCOPY, GASTROSCOPY, DUODENOSCOPY (EGD);  Surgeon: Digna Rhodes MD;  Location: [facility]     ESOPHAGOSCOPY, GASTROSCOPY, DUODENOSCOPY (EGD), COMBINED N/A 2/7/2017    Procedure: COMBINED ENDOSCOPIC ULTRASOUND, ESOPHAGOSCOPY, GASTROSCOPY, DUODENOSCOPY (EGD), FINE NEEDLE ASPIRATE/BIOPSY;  Surgeon: Too Thakur MD;  Location:  OR     HEAD & NECK SURGERY      reconstructive facial surgery following accident in 1989     IR FOLLOW UP VISIT INPATIENT  2/20/2019     IR GASTRO  JEJUNOSTOMY TUBE CHANGE  2018     IR GASTRO JEJUNOSTOMY TUBE CHANGE  2019     IR GASTRO JEJUNOSTOMY TUBE CHANGE  3/8/2019     LAPAROSCOPIC APPENDECTOMY  2013    Procedure: LAPAROSCOPIC APPENDECTOMY;  LAPAROSCOPIC APPENDECTOMY;  Surgeon: Manish Pierce MD;  Location:  OR     LAPAROSCOPIC ASSISTED INSERTION TUBE GASTROTOMY N/A 2016    Procedure: LAPAROSCOPIC ASSISTED INSERTION TUBE GASTROSTOMY;  Surgeon: Manish Pierce MD;  Location:  OR     ORTHOPEDIC SURGERY      right hand repair     TRACHEOSTOMY N/A 9/3/2016    Procedure: TRACHEOSTOMY;  Surgeon: João Ortiz MD;  Location:  OR     TRACHEOSTOMY N/A 2016    Procedure: TRACHEOSTOMY;  Surgeon: João Ortiz MD;  Location:  OR     VASCULAR SURGERY         Prior to Admission Medications   Prior to Admission Medications   Prescriptions Last Dose Informant Patient Reported? Taking?   Brivaracetam (BRIVIACT) 10 MG/ML solution  Mother Yes No   Si mg by Oral or FT or NG tube route 2 times daily @0900 and 2100   Guar Gum (FIBER MODULAR, NUTRISOURCE FIBER,) packet  Mother No No   Si packet by Per G Tube route daily   Multiple Vitamins-Minerals (CENTRUM SILVER) per tablet  Mother Yes No   Sig: Take 1 tablet by mouth daily Crush and feed via j-tube   Vitamin D, Cholecalciferol, 1000 units TABS  Mother Yes No   Sig: Take 2,000 Units by mouth every morning Crush and feed via j-tube   acetaminophen (TYLENOL) 500 MG tablet  Mother Yes No   Si,000 mg by Oral or FT or NG tube route every 6 hours as needed for mild pain   acetylcysteine (MUCOMYST) 20 % neb solution  Mother No No   Sig: Take 2 mLs by nebulization 4 times daily   Patient taking differently: Take 2 mLs by nebulization 4 times daily With Albuterol at 07:00, 11:00, 15:00, 19:00   albuterol (PROVENTIL) (5 MG/ML) 0.5% neb solution  Mother Yes No   Sig: Take 2.5 mg by nebulization 4 times daily 0700 1100 1500 1900 with mucomyst   bacitracin ointment   Mother Yes No   Sig: Apply topically daily as needed for wound care To PEG site.    calcium carbonate 1250 (500 CA) MG/5ML SUSP suspension  Mother No No   Si mLs (1,250 mg) by Per J Tube route 3 times daily (with meals)   carBAMazepine (TEGRETOL) 100 MG/5ML suspension  Mother Yes No   Sig: Take 150 mg by mouth every 6 hours At 06:00, 12:00, 18:00 and 24:00 for seizures   ferrous sulfate 220 (44 Fe) MG/5ML ELIX  Mother Yes No   Si mg by Per Feeding Tube route daily   glycopyrrolate (GLYCATE) 2 MG tablet  Mother Yes No   Sig: Take 1-2 mg by mouth 2 times daily as needed (secretions) 1/2 to 1 tablet (1 - 2 mg) up to twice daily if needed for secretions.   hydrocortisone (CORTEF) 5 MG tablet  Mother Yes No   Sig: 10 mg by Oral or FT or NG tube route daily (with dinner) At 1500   hydrocortisone (CORTEF) 5 MG tablet  Mother Yes No   Si mg by Oral or FT or NG tube route every morning    levothyroxine (SYNTHROID/LEVOTHROID) 137 MCG tablet  Mother Yes No   Si mcg by Oral or FT or NG tube route daily @ 05:00   melatonin (MELATONIN) 1 MG/ML LIQD liquid  Mother Yes No   Si mg by Per NG tube route At Bedtime    order for DME  Mother No No   Sig: Equipment being ordered: Nebulizer   pantoprazole (PROTONIX) 2 mg/mL SUSP suspension  Mother No No   Si mLs (40 mg) by Per J Tube route daily   potassium & sodium phosphates (NEUTRA-PHOS) 280-160-250 MG Packet  Mother Yes No   Sig: Take 1 packet by mouth 3 times daily 0900, 1500, 2100.    testosterone cypionate (DEPOTESTOTERONE CYPIONATE) 200 MG/ML injection  Mother Yes No   Sig: Inject 76 mg into the muscle See Admin Instructions Every 2 weeks on    76 mg or 0.38 mL      Facility-Administered Medications: None     Allergies   Allergies   Allergen Reactions     Dilantin [Phenytoin Sodium]      Valproic Acid      Toxicity c bone marrow suspension, elevated ammonia levels        Social History   I have reviewed this patient's social history and updated  it with pertinent information if needed. Keyon Farias  reports that he quit smoking about 30 years ago. He has never used smokeless tobacco. He reports that he does not drink alcohol or use drugs.    Family History   Family history assessed and, except as above, is non-contributory.    Family History   Problem Relation Age of Onset     Cancer Father        Review of Systems   The 10 point Review of Systems is negative other than noted in the HPI or here.     Primary Care Physician   Carlos Gomez    Data   Labs Ordered and Resulted from Time of ED Arrival Up to the Time of Departure from the ED   COMPREHENSIVE METABOLIC PANEL - Abnormal; Notable for the following components:       Result Value    Sodium 129 (*)     Glucose 103 (*)     Calcium 8.2 (*)     Albumin 2.9 (*)     All other components within normal limits   LACTIC ACID WHOLE BLOOD - Abnormal; Notable for the following components:    Lactic Acid 2.2 (*)     All other components within normal limits   ROUTINE UA WITH MICROSCOPIC - Abnormal; Notable for the following components:    pH Urine 7.5 (*)     Protein Albumin Urine 10 (*)     Urobilinogen mg/dL 4.0 (*)     Nitrite Urine Positive (*)     Leukocyte Esterase Urine Moderate (*)     WBC Urine 25 (*)     Bacteria Urine Few (*)     Mucous Urine Present (*)     All other components within normal limits   CBC WITH PLATELETS DIFFERENTIAL - Abnormal; Notable for the following components:    WBC 11.9 (*)     RBC Count 4.37 (*)     Hemoglobin 12.0 (*)     Hematocrit 36.2 (*)     RDW 15.2 (*)     Absolute Monocytes 1.5 (*)     All other components within normal limits   PULSE OXIMETRY NURSING   CARDIAC CONTINUOUS MONITORING   MEASURE URINE OUTPUT   PATIENT CARE ORDER   URINE CULTURE AEROBIC BACTERIAL   BLOOD CULTURE   BLOOD CULTURE       Data reviewed today:  I personally reviewed the chest x-ray image(s) showing RLL infiltrate.    Recent Results (from the past 24 hour(s))   XR Chest 2 Views    Narrative    CHEST  TWO VIEWS    5/26/2019 10:51 PM     HISTORY: History of aspiration pneumonia.      COMPARISON: 5/16/2019.      Impression    IMPRESSION: Increased hazy consolidation in the right lower lung  concerning for pneumonia. There are small bilateral pleural effusions  which may be slightly increased since prior. Lung volumes remain low.  Cardiac silhouette is unchanged.      YELENA MAE MD

## 2019-05-27 NOTE — PROGRESS NOTES
RECEIVING UNIT ED HANDOFF REVIEW    ED Nurse Handoff Report was reviewed by: Gege Wyman on May 27, 2019 at 12:39 AM

## 2019-05-27 NOTE — PROGRESS NOTES
RECEIVING UNIT ED HANDOFF REVIEW    ED Nurse Handoff Report was reviewed by: Sabra Dover on May 27, 2019 at 1:14 AM

## 2019-05-27 NOTE — ED NOTES
Bed: ED03  Expected date:   Expected time:   Means of arrival:   Comments:  Kerri 2 58M fever, yellow in 4min

## 2019-05-27 NOTE — CONSULTS
"CLINICAL NUTRITION SERVICES  -  ASSESSMENT NOTE    Addendum:    Received consult: Provider order- Registered Dietitian to Assess and Order TF per MNT recommendation   Intervention: Ordered below enteral nutrition recommendation + Free water flushes 30mL Q 4hrs or per MD      Future/Additional Recommendations:   Enteral nutrition per MD: when pt ready to start TF would recommend:  Isosource 1.5 at 100mL/hr x 12hrs (7am-7pm) via GJ tube, providing 1800 cals (25 cals/kg), 82 gm pro (1.1 gm/kg), 18 gm fiber, 912 mL free water, 100% RDI  + Nutrisource fiber daily (1pkt) = 15 kcal and 3g fiber    Malnutrition:   % Weight Loss:  None noted  % Intake:  Decreased intake does not meet criteria for malnutrition   Subcutaneous Fat Loss:  Unable to determine at this time   Muscle Loss:  Unable to determine at this time  Fluid Retention:  none    Malnutrition Diagnosis: Unable to determine due to unable to complete full NFPA- suspect no malnutrition diagnosis at this time         REASON FOR ASSESSMENT  Keyon Farias is a 58 year old male seen by Registered Dietitian for Provider Order - Registered Dietitian to Assess and Recommend TF.  Provider is responsible for entering the TF orders    5/26: Per MD note: \"Nutrition services consulted. Tube feeds on hold for now pending resolution of aspiration.\"    NUTRITION HISTORY  Pt known to our service     Per chart review:  -\"He has been on TF via GJ tube since August 2016; last tube was placed 3/8/19 - 18Fr TORY GJ tube.  Per previous records pt's home TF regimen is as follows - Jevity 1.5 (5 to 5.5 cans daily) x 12 hours during the day to provide 6692-0116 kcal, 77-83g protein, ~260g CHO, ~27g fiber and ~900mL free water. H2O flushes 120 mL QID.  Pt lives with his mother, she likes to keep the TF rate at no more than 100 ml/hr. He is fed during the day rather than at night so that he can be up in the chair to prevent aspiration\"    -Pt with recent hospital visit (5/6-5/9): Enteral " "nutrition   Isosource 1.5 at 100mL/hr x 12 hours (7am-7pm) via GJ tube      CURRENT NUTRITION ORDERS  Diet Order:     NPO     Current Intake/Tolerance:  -IVF at 100mL/hr  -BGM < 150  -Na 137 (NL)      NUTRITION FOCUSED PHYSICAL ASSESSMENT (NFPA)  Completed:  No- pt resting and covered          Observed:    Per RD note on 5/7 \" pt is thin, but no acute changes\"    Obtained from Chart/Interdisciplinary Team:  none    ANTHROPOMETRICS  Height: 6'  Weight: 180 lbs 0 oz  Body mass index is 24.41 kg/m .  Weight Status:  Normal BMI  IBW: 80.9 kg   % IBW: 99%  Weight History: wt trending up over the last month   Wt Readings from Last 10 Encounters:   05/26/19 81.6 kg (180 lb)   05/08/19 74.5 kg (164 lb 3.2 oz)   04/08/19 71.8 kg (158 lb 6.4 oz)   04/02/19 72 kg (158 lb 11.7 oz)   03/18/19 70.9 kg (156 lb 4.9 oz)   03/11/19 77.6 kg (171 lb)   03/04/19 72.6 kg (160 lb)   02/22/19 74.4 kg (164 lb 0.4 oz)   02/01/19 71.5 kg (157 lb 11.2 oz)   01/16/19 71.6 kg (157 lb 13.6 oz)       LABS  Labs reviewed    MEDICATIONS  Medications reviewed      ASSESSED NUTRITION NEEDS PER APPROVED PRACTICE GUIDELINES:    Dosing Weight 81.6 kg (admit wt)  Estimated Energy Needs: 0266-6239 kcals (25-30 Kcal/Kg)  Justification: maintenance  Estimated Protein Needs: 82-98 grams protein (1-1.2 g pro/Kg)  Justification: preservation of lean body mass  Estimated Fluid Needs: 9637-7299 mL (1 mL/Kcal) - or per MD  Justification: maintenance    MALNUTRITION:  % Weight Loss:  None noted  % Intake:  Decreased intake does not meet criteria for malnutrition   Subcutaneous Fat Loss:  Unable to determine at this time   Muscle Loss:  Unable to determine at this time  Fluid Retention:  none    Malnutrition Diagnosis: Unable to determine due to unable to complete full NFPA- suspect no malnutrition diagnosis at this time     NUTRITION DIAGNOSIS:  Inadequate protein-energy intake related to no enteral nutrition given at this time as evidenced by pt reliant on EN to " meet 100% of assessed needs       NUTRITION INTERVENTIONS  Recommendations / Nutrition Prescription  Diet per MD  Enteral nutrition initiation per MD    Implementation  Nutrition education: None at this time      Nutrition Goals  Enteral nutrition to be initiated within the next 24-48 hours       MONITORING AND EVALUATION:  Progress towards goals will be monitored and evaluated per protocol and Practice Guidelines      Meliza Washburn RD, LD

## 2019-05-27 NOTE — PLAN OF CARE
DATE & TIME: 5- AM shift  ORIENTATION: HECTOR; nonverbal, moans.   BEHAVIOR & AGGRESSION TOOL COLOR: GREEN  CIWA SCORE:           ABNL VS/O2:VSS on RA  MOBILITY: total care; hemiplegic. T/R q2hr  PAIN MANAGMENT: nonverbal indicators of pain absent.   DIET: Strict NPO; tube feedings started at 2 pm, rate 50 ml/hr.  BOWEL/BLADDER:Male external catheter placed, draining great. Good urinal output. Smear for BM.  ABNL LAB/BG: WBC 12.5  DRAIN/DEVICES: NS @100. PEG tube present.   TELEMETRY RHYTHM:   SKIN: blanchable red coccyx; new mepilex in place; CDI   TESTS/PROCEDURES:   D/C DAY/GOALS/PLACE: Discharge TBD  OTHER IMPORTANT INFO: infrequent cough; some secretions in back of throat, pt unable to cough it up/swallows it. Contact isolation maintained.

## 2019-05-27 NOTE — PROGRESS NOTES
Vascular Access RN Late entry: Attempted PIV x 2 with US at 1450.Unable to gain access. Bedside RN notified and she will call anesthesia to attempt PIV placement.

## 2019-05-27 NOTE — PROGRESS NOTES
DATE & TIME: 2300-0730 5/27/19    ORIENTATION: HECTOR; nonverbal    BEHAVIOR & AGGRESSION TOOL COLOR: GREEN  CIWA SCORE:    ABNL VS/O2: VSS. RA  MOBILITY: total care; hemiplegic   PAIN MANAGMENT: nonverbal indicators of pain absent.   DIET: Strict NPO; tube feedings on hold at this time   BOWEL/BLADDER: heavy incontinence.   ABNL LAB/BG: WBC 11.9. UC pending   DRAIN/DEVICES: NS @100. PEG tube present.   TELEMETRY RHYTHM:   SKIN: blanchable red coccyx; mepilex in place; CDI   TESTS/PROCEDURES:   D/C DAY/GOALS/PLACE: Discharge TBD  OTHER IMPORTANT INFO: infrequent cough; some secretions in back of throat. Contact isolation maintained.

## 2019-05-27 NOTE — ED NOTES
Per EMS, patient's PCA found patient to have temp of 100.3F and called EMS due to history of infections. EMS reports no other pertinent symptoms were acknowledged to them, no new cough or pain, no change in bowel or bladder patterns.

## 2019-05-27 NOTE — ED TRIAGE NOTES
Has a PCA, who noticed he had a 100.3 fever.  No coughing or other obvious signs of infection.  Alert and smiling.

## 2019-05-27 NOTE — PROGRESS NOTES
Admission    Patient arrives to room 608 via cart from ED.  Care plan note: see notes     Inpatient nursing criteria listed below were met:    PCD's Documented: Yes  Skin issues/needs documented :Yes  Isolation education started/completed Yes  Patient allergies verified with patient: pt nonverbal  Verified completion of [place] Risk Assessment Tool:  Yes  Verified completion of Guardianship screening tool: Yes  Fall Prevention: Care plan updated, Education given and documented Yes  Care Plan initiated: Yes  Home medications documented in belongings flowsheet: NA  Patient belongings documented in belongings flowsheet: Yes  Reminder note (belongings/ medications) placed in discharge instructions:No  Admission profile/ required documentation complete: Yes  Bedside Report Letter given and explained to patient No

## 2019-05-27 NOTE — PROGRESS NOTES
No charge note today. Pt with TBI and severe dysphagia on tube feeds via J tube with GJ tube in place. On zosyn. Continue current cares. Aspirate the Gtube every shift to empty the stomach to prevent aspiration risk and added reglan 10mg TID via J tube help with gut motility to prevent aspiration   tubefeeds to restart today via J tube     Starr Champion MD

## 2019-05-27 NOTE — CONSULTS
[facility]  Gastroenterology Consultation         Keyon Farias  [address] [place] [address]  58 year old male    Admission Date/Time: 5/26/2019  Primary Care Provider: Carlos Gomez  Referring / Attending Physician:  Dr. Champion    We were asked to see the patient in consultation by Dr. Champion for evaluation of recurrent aspiration pneumonias.      CC: Recurrent aspiration pneumonia    HPI:  Keyon Farias is a 58 year old male who was admitted due to aspiration pneumonia.  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  Currently patient is not having any symptoms of pancreas problems or pancreatitis there is no signs of sepsis or infection from abdominal source patient is currently dealing with significant secretions and aspiration pneumonia.  Patient has been hospitalized at least 8-10 times over last few months.  Clinically most of the conditions are pretty similar to his previous episodes of aspirations.  Most of the information was obtained from discussing with the hospitalist team and reviewing chart patient has not been able to communicate and give any significant meaningful history.    ROS: A comprehensive ten point review of systems was negative aside from those in mentioned in the HPI.      PAST MED HX:  I have reviewed this patient's medical history and updated it with pertinent information if needed.   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)     Patient has little porductive speech but at baseline can understand simple commands  consistently     DVT of upper extremity (deep vein thrombosis) (H)      Gastro-oesophageal reflux disease      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      Pneumonia      Seizures (H)     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     Septic shock (H)      Spastic hemiplegia affecting dominant side (H)     related to wil injury     Thyroid disease      Tracheostomy care (H)      Traumatic brain injury (H)      Unspecified cerebral artery occlusion with cerebral infarction      UTI (urinary tract infection)      Ventricular fibrillation (H)      Ventricular tachyarrhythmia (H)        MEDICATIONS:   Prior to Admission Medications   Prescriptions Last Dose Informant Patient Reported? Taking?   Brivaracetam (BRIVIACT) 10 MG/ML solution  Other Yes Yes   Si mg by Oral or FT or NG tube route 2 times daily @0900 and 2100   Guar Gum (FIBER MODULAR, NUTRISOURCE FIBER,) packet Unknown at Unknown time Other No No   Si packet by Per G Tube route daily   Multiple Vitamins-Minerals (CENTRUM SILVER) per tablet  Other Yes Yes   Sig: Take 1 tablet by mouth daily Crush and feed via j-tube   Vitamin D, Cholecalciferol, 1000 units TABS  Other Yes Yes   Sig: Take 2,000 Units by mouth every morning Crush and feed via j-tube   acetaminophen (TYLENOL) 500 MG tablet  Other Yes Yes   Si,000 mg by Oral or FT or NG tube route every 6 hours as needed for mild pain   acetylcysteine (MUCOMYST) 20 % neb solution  Other No Yes   Sig: Take 2 mLs by nebulization 4 times daily   Patient taking differently: Take 2 mLs by nebulization 4 times daily With Albuterol at 07:00, 11:00, 15:00, 19:00   albuterol (PROVENTIL) (5 MG/ML) 0.5% neb solution  Other Yes Yes   Sig: Take 2.5 mg by nebulization 4 times daily 0700 1100 1500 1900 with mucomyst   bacitracin ointment  Other Yes Yes   Sig: Apply topically daily as needed for wound care To PEG site.    calcium carbonate 1250 (500 CA)  MG/5ML SUSP suspension  Other No Yes   Si mLs (1,250 mg) by Per J Tube route 3 times daily (with meals)   carBAMazepine (TEGRETOL) 100 MG/5ML suspension  Other Yes Yes   Sig: Take 150 mg by mouth every 6 hours At 06:00, 12:00, 18:00 and 24:00 for seizures   ferrous sulfate 220 (44 Fe) MG/5ML ELIX  Other Yes Yes   Si mg by Per Feeding Tube route daily   glycopyrrolate (GLYCATE) 2 MG tablet  Other Yes Yes   Sig: Take 1-2 mg by mouth 2 times daily as needed (secretions) 1/2 to 1 tablet (1 - 2 mg) up to twice daily if needed for secretions.   hydrocortisone (CORTEF) 5 MG tablet  Other Yes Yes   Sig: 10 mg by Oral or FT or NG tube route daily (with dinner) At 1500   hydrocortisone (CORTEF) 5 MG tablet  Other Yes Yes   Si mg by Oral or FT or NG tube route every morning    levothyroxine (SYNTHROID/LEVOTHROID) 137 MCG tablet  Other Yes Yes   Si mcg by Oral or FT or NG tube route daily @ 05:00   melatonin (MELATONIN) 1 MG/ML LIQD liquid  Other Yes Yes   Si mg by Per NG tube route At Bedtime    order for DME  Other No No   Sig: Equipment being ordered: Nebulizer   pantoprazole (PROTONIX) 2 mg/mL SUSP suspension  Other No Yes   Si mLs (40 mg) by Per J Tube route daily   potassium & sodium phosphates (NEUTRA-PHOS) 280-160-250 MG Packet  Other Yes Yes   Sig: Take 1 packet by mouth 3 times daily 0900, 1500, 2100.    testosterone cypionate (DEPOTESTOTERONE CYPIONATE) 200 MG/ML injection  Other Yes Yes   Sig: Inject 76 mg into the muscle See Admin Instructions Every 2 weeks on    76 mg or 0.38 mL      Facility-Administered Medications: None       ALLERGIES:   Allergies   Allergen Reactions     Dilantin [Phenytoin Sodium]      Valproic Acid      Toxicity c bone marrow suspension, elevated ammonia levels        SOCIAL HISTORY:  Social History     Tobacco Use     Smoking status: Former Smoker     Last attempt to quit: 1989     Years since quittin.1     Smokeless tobacco: Never Used    Substance Use Topics     Alcohol use: No     Drug use: No       FAMILY HISTORY:  Family History   Problem Relation Age of Onset     Cancer Father        PHYSICAL EXAM:   General awake laying comfortably in the bed with some coughs  Vital Signs with Ranges  Temp: 98  F (36.7  C) Temp src: Oral BP: 94/54 Pulse: 73 Heart Rate: 72 Resp: 16 SpO2: 95 % O2 Device: None (Room air)    I/O last 3 completed shifts:  In: 120 [NG/GT:120]  Out: -     Constitutional: healthy, alert and no distress   Cardiovascular: negative, PMI normal. No lifts, heaves, or thrills. RRR. No murmurs, clicks gallops or rub  Respiratory: negative, Percussion normal. Good diaphragmatic excursion. Lungs clear  Head: Normocephalic. No masses, lesions, tenderness or abnormalities  Neck: Neck supple. No adenopathy. Thyroid symmetric, normal size,, Carotids without bruits.  Abdomen: Abdomen soft, non-tender. BS normal. No masses, organomegaly          ADDITIONAL COMMENTS:   I reviewed the patient's new clinical lab test results.   Recent Labs   Lab Test 05/27/19  0853 05/26/19 2326 05/16/19 2234 02/07/17  0452  01/30/17  0340  01/25/17  0426   WBC 12.5* 11.9* 8.7   < > 11.7*   < > 10.7   < > 7.2   HGB 12.4* 12.0* 13.2*   < > 9.4*   < > 7.6*   < > 7.8*   MCV 83 83 84   < > 82   < > 81   < > 81    154 165   < > 363   < > 163   < > 162   INR  --   --   --   --  1.27*  --  1.32*  --  1.49*    < > = values in this interval not displayed.     Recent Labs   Lab Test 05/27/19  0853 05/26/19 2218 05/16/19 2234   POTASSIUM 4.5 4.5 3.6   CHLORIDE 107 98 103   CO2 26 24 33*   BUN 10 12 15   ANIONGAP 4 7 5     Recent Labs   Lab Test 05/27/19  0853 05/26/19 2232 05/26/19  2218 05/16/19  2234 05/15/19  0508 05/06/19  2310 05/06/19  2257  05/16/18  0655  05/01/18  2355   ALBUMIN 2.9*  --  2.9* 3.0*  --   --  3.0*   < >  --    < >  --    BILITOTAL 0.8  --  0.5 0.2  --   --  0.2   < >  --    < >  --    ALT 18 --  19 27  --   --  26   < >  --    < >  --     AST 15  --  17 22  --   --  28   < >  --    < >  --    PROTEIN  --  10*  --   --  10* 10*  --    < >  --    < >  --    LIPASE  --   --   --   --   --   --  489*  --  289  --  283    < > = values in this interval not displayed.       I reviewed the patient's new imaging results.        CONSULTATION ASSESSMENT AND PLAN:    Active Problems:    Sepsis (H)    Assessment: Very unfortunate 58-year-old gentleman with a complicated past history with spastic hemiplegia recurrent hospitalization for aspiration chronic tube feeding poor control of secretions with CT scan findings of 3 cm lesion in the pancreas which is different from year ago  CT scan.  At this point I do not think pancreatic cyst is of any significance most likely patient has recurrent fluid collection and a small cyst in the pancreas which is incidental patient is status post endoscopic treatment for his necrotic pancreatitis and necrosectomy in the past.  Regarding his recurrent aspiration I will recommend the patient to be kept on 30 degree head elevation to avoid aspiration check gastric residuals with G port aspirations months and shift continue on slow tube feeding patient would benefit from close monitoring of his secretions and suction to avoid recurrent aspirations other options including tracheostomy would be another possibility however giving patient's overall complicated history any of the intervention will be significant.  At this point I will recommend the patient to be monitored clinically from GI standpoint I will recommend him to have a repeat CT scan done in 3 to 6 months to follow-up on pancreatic cyst.  Thank you very much for letting me participate in his care.                Beny Davis MD, FACP  [facility].  Office: [phone]  Cell : [phone]

## 2019-05-28 LAB
ERYTHROCYTE [DISTWIDTH] IN BLOOD BY AUTOMATED COUNT: 15.9 % (ref 10–15)
HCT VFR BLD AUTO: 37.9 % (ref 40–53)
HGB BLD-MCNC: 12.1 G/DL (ref 13.3–17.7)
LACTATE BLD-SCNC: 1.8 MMOL/L (ref 0.7–2)
MCH RBC QN AUTO: 27.3 PG (ref 26.5–33)
MCHC RBC AUTO-ENTMCNC: 31.9 G/DL (ref 31.5–36.5)
MCV RBC AUTO: 86 FL (ref 78–100)
PLATELET # BLD AUTO: 189 10E9/L (ref 150–450)
RBC # BLD AUTO: 4.43 10E12/L (ref 4.4–5.9)
WBC # BLD AUTO: 10 10E9/L (ref 4–11)

## 2019-05-28 PROCEDURE — 36569 INSJ PICC 5 YR+ W/O IMAGING: CPT

## 2019-05-28 PROCEDURE — 12000000 ZZH R&B MED SURG/OB

## 2019-05-28 PROCEDURE — 99233 SBSQ HOSP IP/OBS HIGH 50: CPT | Performed by: INTERNAL MEDICINE

## 2019-05-28 PROCEDURE — 25000132 ZZH RX MED GY IP 250 OP 250 PS 637: Performed by: INTERNAL MEDICINE

## 2019-05-28 PROCEDURE — 40000275 ZZH STATISTIC RCP TIME EA 10 MIN

## 2019-05-28 PROCEDURE — A9270 NON-COVERED ITEM OR SERVICE: HCPCS | Performed by: INTERNAL MEDICINE

## 2019-05-28 PROCEDURE — 25000128 H RX IP 250 OP 636: Performed by: INTERNAL MEDICINE

## 2019-05-28 PROCEDURE — 27210429 ZZH NUTRITION PRODUCT INTERMEDIATE LITER

## 2019-05-28 PROCEDURE — 25000128 H RX IP 250 OP 636: Performed by: HOSPITALIST

## 2019-05-28 PROCEDURE — 25000125 ZZHC RX 250: Performed by: HOSPITALIST

## 2019-05-28 PROCEDURE — 85027 COMPLETE CBC AUTOMATED: CPT | Performed by: INTERNAL MEDICINE

## 2019-05-28 PROCEDURE — 94640 AIRWAY INHALATION TREATMENT: CPT | Mod: 76

## 2019-05-28 PROCEDURE — 36415 COLL VENOUS BLD VENIPUNCTURE: CPT | Performed by: INTERNAL MEDICINE

## 2019-05-28 PROCEDURE — 83605 ASSAY OF LACTIC ACID: CPT | Performed by: INTERNAL MEDICINE

## 2019-05-28 PROCEDURE — 27211407 ZZ H KIT CATH IV 18 OR 20 G, POWERGLIDE BASIC

## 2019-05-28 PROCEDURE — 25000125 ZZHC RX 250: Performed by: INTERNAL MEDICINE

## 2019-05-28 PROCEDURE — 94640 AIRWAY INHALATION TREATMENT: CPT

## 2019-05-28 RX ADMIN — PIPERACILLIN SODIUM,TAZOBACTAM SODIUM 4.5 G: 4; .5 INJECTION, POWDER, FOR SOLUTION INTRAVENOUS at 12:58

## 2019-05-28 RX ADMIN — IPRATROPIUM BROMIDE AND ALBUTEROL SULFATE 3 ML: .5; 3 SOLUTION RESPIRATORY (INHALATION) at 15:51

## 2019-05-28 RX ADMIN — PIPERACILLIN SODIUM,TAZOBACTAM SODIUM 4.5 G: 4; .5 INJECTION, POWDER, FOR SOLUTION INTRAVENOUS at 00:07

## 2019-05-28 RX ADMIN — METOCLOPRAMIDE 10 MG: 5 SOLUTION ORAL at 13:55

## 2019-05-28 RX ADMIN — Medication 1 PACKET: at 10:43

## 2019-05-28 RX ADMIN — LEVOTHYROXINE SODIUM 137 MCG: 112 TABLET ORAL at 10:03

## 2019-05-28 RX ADMIN — Medication 40 MG: at 10:02

## 2019-05-28 RX ADMIN — ENOXAPARIN SODIUM 40 MG: 40 INJECTION SUBCUTANEOUS at 12:58

## 2019-05-28 RX ADMIN — PIPERACILLIN SODIUM,TAZOBACTAM SODIUM 4.5 G: 4; .5 INJECTION, POWDER, FOR SOLUTION INTRAVENOUS at 18:28

## 2019-05-28 RX ADMIN — PIPERACILLIN SODIUM,TAZOBACTAM SODIUM 4.5 G: 4; .5 INJECTION, POWDER, FOR SOLUTION INTRAVENOUS at 23:48

## 2019-05-28 RX ADMIN — HYDROCORTISONE 20 MG: 20 TABLET ORAL at 10:03

## 2019-05-28 RX ADMIN — IPRATROPIUM BROMIDE AND ALBUTEROL SULFATE 3 ML: .5; 3 SOLUTION RESPIRATORY (INHALATION) at 10:57

## 2019-05-28 RX ADMIN — METOCLOPRAMIDE 10 MG: 5 SOLUTION ORAL at 20:50

## 2019-05-28 RX ADMIN — LIDOCAINE HYDROCHLORIDE 1.5 ML: 10 INJECTION, SOLUTION INFILTRATION; PERINEURAL at 08:53

## 2019-05-28 RX ADMIN — HYDROCORTISONE 10 MG: 10 TABLET ORAL at 18:28

## 2019-05-28 RX ADMIN — IPRATROPIUM BROMIDE AND ALBUTEROL SULFATE 3 ML: .5; 3 SOLUTION RESPIRATORY (INHALATION) at 06:53

## 2019-05-28 RX ADMIN — METOCLOPRAMIDE 10 MG: 5 SOLUTION ORAL at 10:02

## 2019-05-28 RX ADMIN — IPRATROPIUM BROMIDE AND ALBUTEROL SULFATE 3 ML: .5; 3 SOLUTION RESPIRATORY (INHALATION) at 18:48

## 2019-05-28 ASSESSMENT — ACTIVITIES OF DAILY LIVING (ADL)
ADLS_ACUITY_SCORE: 45
ADLS_ACUITY_SCORE: 45
ADLS_ACUITY_SCORE: 41
ADLS_ACUITY_SCORE: 45

## 2019-05-28 NOTE — PLAN OF CARE
DATE & TIME: 5/27/19 1900-2300  ORIENTATION: Disoriented to time, place, and situation. Nonverbal. Nods/shakes head appropiately. Gives thumbs up or down.   BEHAVIOR & AGGRESSION TOOL COLOR: green (but will grab things and not let go)  CIWA SCORE: NA  ABNL VS/O2: VSS on RA  MOBILITY: up with lift  PAIN MANAGMENT: denies  DIET: NPO, TF ([id number])  BOWEL/BLADDER: incontinent of bowel and bladder. External catheter in place.   ABNL LAB/BG: NA  DRAIN/DEVICES: G/J tube.   TELEMETRY RHYTHM: NA  SKIN: blanchable erythema to coccyx.  TESTS/PROCEDURES: NA  D/C DAY/GOALS/PLACE: pending improvement  OTHER IMPORTANT INFO: Mother - [place] - Guardian

## 2019-05-28 NOTE — PLAN OF CARE
DATE & TIME: 5- AM shift  ORIENTATION: HECTOR; nonverbal, moans.   BEHAVIOR & AGGRESSION TOOL COLOR: GREEN  CIWA SCORE:           ABNL VS/O2: VSS on RA except tachy this morning. Sepsis protocol fired. Lactate for sepsis back as 1.8.   MOBILITY: total care; hemiplegic. T/R q2hr  PAIN MANAGMENT: nonverbal indicators of pain absent.   DIET: Strict NPO; TF at 100 ml/hr. Tolerating well.   BOWEL/BLADDER: Incontinent of urine. No BM this shift.   ABNL LAB/BG:   DRAIN/DEVICES:Midline placed, patent. PEG tube present.   TELEMETRY RHYTHM:   SKIN: blanchable red coccyx; new mepilex in place; CDI   TESTS/PROCEDURES:   D/C DAY/GOALS/PLACE: Discharge TBD  OTHER IMPORTANT INFO: infrequent cough; some secretions in back of throat, pt unable to cough it up/swallows it. Also used suctioning. Contact isolation maintained.

## 2019-05-28 NOTE — PROGRESS NOTES
[facility]    Hospitalist Progress Note    Date of Service (when I saw the patient): 05/28/2019    Assessment & Plan   Keyon Farias is a 58 year old male who was admitted on 5/26/2019.    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     PLAN     1) Suspected recurrent aspiration pneumonia causing severe sepsis: He has been hospitalized here 9 times since 12/2018 for recurrent sepsis due to recurrent aspiration pneumonia. Blood cultures once from 1/2019 and again 2/2019 were positive for different types of coagulase negative Staph organisms, likely contaminants; no subsequent blood cultures have been positive for pathogens. TTE 4/2019 showed preserved LVEF without wall motion abnormalities. Most recently he was hospitalized a tenth time from 5/6 through 5/9/2019 for right lower lobe aspiration pneumonia, seen on a CT; tube feeds were held and he was given Zosyn, transitioned to Augmentin. Tube feeds were resumed and he was discharged back to his facility.     Now he presents for recurrent cough and fever and CXR shows possible increase in size of his right lower lobe infiltrate. His WBC have risen since discharge and Lactic acid is elevated; thus he could have early onset severe sepsis due to recurrent aspiration pneumonia.    --. Continue Zosyn empirically for now. Duonebs scheduled q4 hours, Albuterol nebs every 2 hours as needed.   Monitor oxygenation.  Will swicth zosyn to augmentin via J tube today   Added reglan 10mg via J tube TID to help with gut motility to prevent aspiration    Aspirate G tube every shift to empty the stomach contents to prevent aspiration as well           2) Pan-hypopituitarism: We will treat empirically for relative adrenal insufficiency.   resumed home PO Hydrocortisone  3) Hyponatremia: Possibly pre-renal, vs SIADH:    stopped IVF today   Sodium normalized with IVF      3) TBI, spastic hemiplegia, severe dysphagia:     -- Fall precautions     -- Nutrition services consulted. Tube feeds restarted on 5/27 7pm to 7Am      -- Maintain indwelling Lerma catheter     4) Enlarging pancreatic tail cyst: Reportedly in 2017 he had necrotizing pancreatitis requiring cystgastrostomy with stent placement and subsequent removal (as per EUS/EGD 1/2017 and 2/2017). Prior EGD 2014 had shown gastritis and esophagitis. More recently on 5/2018 follow up CT noted a cyst in the pancreatic tail. At his most recent hospitalization CT, this cyst was seen to have grown to 3.6 cm (prior size not noted in the Radiology read). This could be a cystic malignancy given recent increase in size, vs more benign type of cyst.    Discussed with  recommended follow up CT scan in 6months to follow the cyst        5) Seizure disorder: Resumed Tegretol      6.  Hypothyroidism.  Resumed Synthroid      7.  Hypogonadotropic hypogonadism.  Resume PTA Testosterone injection at discharge             DVT Prophylaxis: Enoxaparin (Lovenox) SQ  Code Status: Full Code    Disposition: Expected discharge in 1-2days if remains stable     Starr Champion MD  [phone] (P)      Interval History   Doing well. Occasional cough. Tolerating tube feeds     -Data reviewed today: I reviewed all new labs and imaging results over the last 24 hours. I personally reviewed no images or EKG's today.    Physical Exam   Temp: 97.5  F (36.4  C) Temp src: Oral BP: 120/78 Pulse: 73 Heart Rate: 103 Resp: 18 SpO2: 95 % O2 Device: None (Room air)    Vitals:    05/26/19 2140   Weight: 81.6 kg (180 lb)     Vital Signs with  Ranges  Temp:  [97.5  F (36.4  C)-98.1  F (36.7  C)] 97.5  F (36.4  C)  Pulse:  [73] 73  Heart Rate:  [] 103  Resp:  [16-18] 18  BP: ()/(54-78) 120/78  SpO2:  [95 %-98 %] 95 %  I/O last 3 completed shifts:  In: 610 [I.V.:400; NG/GT:210]  Out: 1900 [Urine:1900]    Constitutional: Awake, alert, cooperative, no apparent distress  Respiratory: Clear to auscultation bilaterally, no crackles or wheezing  Cardiovascular: Regular rate and rhythm, normal S1 and S2, and no murmur noted  GI: Normal bowel sounds, soft, non-distended, non-tender  Skin/Integumen: No rashes, no cyanosis, no edema  Other:     Medications     IV fluid REPLACEMENT ONLY         enoxaparin  40 mg Subcutaneous Q24H     fiber modular (NUTRISOURCE FIBER)  1 packet Per Feeding Tube Daily     hydrocortisone  10 mg Per Feeding Tube Daily with supper     hydrocortisone  20 mg Per Feeding Tube QAM     ipratropium - albuterol 0.5 mg/2.5 mg/3 mL  3 mL Nebulization 4x Daily     levothyroxine  137 mcg Per Feeding Tube Daily     metoclopramide  10 mg Per J Tube TID     pantoprazole  40 mg Per J Tube Daily     piperacillin-tazobactam  4.5 g Intravenous Q6H     sodium chloride (PF)  10 mL Intracatheter Q8H     sodium chloride (PF)  10 mL Intracatheter Q8H     sodium chloride (PF)  3 mL Intracatheter Q8H       Data   Recent Labs   Lab 05/28/19  0727 05/27/19  0853 05/26/19  2326 05/26/19  2218   WBC 10.0 12.5* 11.9*  --    HGB 12.1* 12.4* 12.0*  --    MCV 86 83 83  --     161 154  --    NA  --  137  --  129*   POTASSIUM  --  4.5  --  4.5   CHLORIDE  --  107  --  98   CO2  --  26  --  24   BUN  --  10  --  12   CR  --  0.81  --  0.78   ANIONGAP  --  4  --  7   STEVE  --  8.8  --  8.2*   GLC  --  128*  --  103*   ALBUMIN  --  2.9*  --  2.9*   PROTTOTAL  --  7.5  --  7.0   BILITOTAL  --  0.8  --  0.5   ALKPHOS  --  99  --  97   ALT  --  18  --  19   AST  --  15  --  17       No results found for this or any previous visit (from the past 24  hour(s)).

## 2019-05-28 NOTE — PROGRESS NOTES
[facility]  Patient is currently open to home care services with [facility]. The patient is currently receiving Skilled Nursing and PT services. [facility]  and team have been notified of patient admission. [facility] liaison will continue to follow patient during stay. If appropriate provide orders to resume home care at time of discharge.

## 2019-05-28 NOTE — PROGRESS NOTES
DATE & TIME: 2300-0730 5/28/19   ORIENTATION: HECTOR; nonverbal    BEHAVIOR & AGGRESSION TOOL COLOR: GREEN; gets agitated with memo cleaning and can swing fist so be aware of that for safety.   CIWA SCORE:    ABNL VS/O2: VSS. RA  MOBILITY: Lift; hemiplegic   PAIN MANAGMENT: nonverbal indicators of pain absent   DIET: NPO; tube feeds   BOWEL/BLADDER: incontinent of bowel and bladder.   ABNL LAB/BG: WBC 12.5.   DRAIN/DEVICES: pulled IV; sent page to vascular access team to consider PICC. NS @100. Tube feedings from 7-7  TELEMETRY RHYTHM:   SKIN: blanchable coccyx; mepilex in place; changed on shift   TESTS/PROCEDURES:   D/C DAY/GOALS/PLACE: Discharge TBD  OTHER IMPORTANT INFO: contact precautions maintained.

## 2019-05-29 LAB
BACTERIA SPEC CULT: ABNORMAL
Lab: ABNORMAL
SPECIMEN SOURCE: ABNORMAL

## 2019-05-29 PROCEDURE — 27210429 ZZH NUTRITION PRODUCT INTERMEDIATE LITER

## 2019-05-29 PROCEDURE — 94640 AIRWAY INHALATION TREATMENT: CPT | Mod: 76

## 2019-05-29 PROCEDURE — 40000275 ZZH STATISTIC RCP TIME EA 10 MIN

## 2019-05-29 PROCEDURE — 40000239 ZZH STATISTIC VAT ROUNDS

## 2019-05-29 PROCEDURE — A9270 NON-COVERED ITEM OR SERVICE: HCPCS | Performed by: INTERNAL MEDICINE

## 2019-05-29 PROCEDURE — 12000000 ZZH R&B MED SURG/OB

## 2019-05-29 PROCEDURE — A9270 NON-COVERED ITEM OR SERVICE: HCPCS | Performed by: HOSPITALIST

## 2019-05-29 PROCEDURE — 25000132 ZZH RX MED GY IP 250 OP 250 PS 637: Performed by: INTERNAL MEDICINE

## 2019-05-29 PROCEDURE — 99233 SBSQ HOSP IP/OBS HIGH 50: CPT | Performed by: INTERNAL MEDICINE

## 2019-05-29 PROCEDURE — 94640 AIRWAY INHALATION TREATMENT: CPT

## 2019-05-29 PROCEDURE — 25000128 H RX IP 250 OP 636: Performed by: INTERNAL MEDICINE

## 2019-05-29 PROCEDURE — 25000128 H RX IP 250 OP 636: Performed by: HOSPITALIST

## 2019-05-29 PROCEDURE — 25000132 ZZH RX MED GY IP 250 OP 250 PS 637: Performed by: HOSPITALIST

## 2019-05-29 PROCEDURE — 25000125 ZZHC RX 250: Performed by: HOSPITALIST

## 2019-05-29 RX ORDER — CIPROFLOXACIN 500 MG/1
500 TABLET, FILM COATED ORAL EVERY 12 HOURS SCHEDULED
Status: DISCONTINUED | OUTPATIENT
Start: 2019-05-29 | End: 2019-05-30 | Stop reason: HOSPADM

## 2019-05-29 RX ADMIN — LEVOTHYROXINE SODIUM 137 MCG: 112 TABLET ORAL at 08:55

## 2019-05-29 RX ADMIN — METOCLOPRAMIDE 10 MG: 5 SOLUTION ORAL at 20:08

## 2019-05-29 RX ADMIN — METOCLOPRAMIDE 10 MG: 5 SOLUTION ORAL at 14:35

## 2019-05-29 RX ADMIN — AMOXICILLIN AND CLAVULANATE POTASSIUM 1 TABLET: 875; 125 TABLET, FILM COATED ORAL at 08:55

## 2019-05-29 RX ADMIN — ENOXAPARIN SODIUM 40 MG: 40 INJECTION SUBCUTANEOUS at 11:57

## 2019-05-29 RX ADMIN — CIPROFLOXACIN HYDROCHLORIDE 500 MG: 500 TABLET, FILM COATED ORAL at 21:11

## 2019-05-29 RX ADMIN — AMOXICILLIN AND CLAVULANATE POTASSIUM 1 TABLET: 875; 125 TABLET, FILM COATED ORAL at 20:08

## 2019-05-29 RX ADMIN — IPRATROPIUM BROMIDE AND ALBUTEROL SULFATE 3 ML: .5; 3 SOLUTION RESPIRATORY (INHALATION) at 16:45

## 2019-05-29 RX ADMIN — HYDROCORTISONE 20 MG: 20 TABLET ORAL at 08:55

## 2019-05-29 RX ADMIN — ONDANSETRON 4 MG: 2 INJECTION INTRAMUSCULAR; INTRAVENOUS at 11:59

## 2019-05-29 RX ADMIN — Medication 40 MG: at 08:56

## 2019-05-29 RX ADMIN — Medication 1 PACKET: at 10:10

## 2019-05-29 RX ADMIN — CIPROFLOXACIN HYDROCHLORIDE 500 MG: 500 TABLET, FILM COATED ORAL at 11:08

## 2019-05-29 RX ADMIN — IPRATROPIUM BROMIDE AND ALBUTEROL SULFATE 3 ML: .5; 3 SOLUTION RESPIRATORY (INHALATION) at 18:53

## 2019-05-29 RX ADMIN — SENNOSIDES A AND B 5 ML: 415.36 LIQUID ORAL at 11:56

## 2019-05-29 RX ADMIN — METOCLOPRAMIDE 10 MG: 5 SOLUTION ORAL at 08:56

## 2019-05-29 RX ADMIN — IPRATROPIUM BROMIDE AND ALBUTEROL SULFATE 3 ML: .5; 3 SOLUTION RESPIRATORY (INHALATION) at 07:39

## 2019-05-29 RX ADMIN — HYDROCORTISONE 10 MG: 10 TABLET ORAL at 17:23

## 2019-05-29 RX ADMIN — PIPERACILLIN SODIUM,TAZOBACTAM SODIUM 4.5 G: 4; .5 INJECTION, POWDER, FOR SOLUTION INTRAVENOUS at 06:08

## 2019-05-29 RX ADMIN — IPRATROPIUM BROMIDE AND ALBUTEROL SULFATE 3 ML: .5; 3 SOLUTION RESPIRATORY (INHALATION) at 12:18

## 2019-05-29 ASSESSMENT — ACTIVITIES OF DAILY LIVING (ADL)
ADLS_ACUITY_SCORE: 43
ADLS_ACUITY_SCORE: 43
ADLS_ACUITY_SCORE: 45

## 2019-05-29 NOTE — CONSULTS
Care Transition Initial Assessment - RN    DATA   Active Problems:    Sepsis (H)       Cognitive Status: Nonverbal with hx TBI, unable to assess orientation.        Contact information and PCP information verified: Yes  Lives With: parent(s)      Insurance concerns: No Insurance issues identified  ASSESSMENT  Patient currently receives the following services:  12hr Nursing and 12hr PCA daily.  Pt also has skilled services through [facility] that will need to be resumed.       Identified issues/concerns regarding health management: Pt is a total care pt.  This is his 10th admission so far this year with a few additional ER visits.  Mostly due to aspiration issues.  His Mother Savannah is his primary caregiver and Guardian.  Unfortunately, she had surgery on 5/2/19 and is now at a rehab facility.  Pt's PCA, Keyon Dover, ([phone]) lives with pt and his mother.  In conversation with Savannah this afternoon, she prefers all further discharge planning to be communicated with Keyon, so that she does not have to worry about this while she is trying to get stronger.  Keyon noted that his often caring for pt 24 hrs per day when the nurse does not show up.  He is able to do pt's tube feedings and medications.  The one new treatment is the G-port aspiration.  In discussion with Dr Champion, the G-port aspiration will be every 12 hrs to enable the home care nurse to do this.  This AM, when writer had a conversation with Savannah, she had noted pt's 12hr home care nurse was through [facility].  After communication with pt's PCA, found the 12hr nurse was through [facility] ([phone]).  In discussion with Lisa from [facility], found that this home care agency provided a nurse only on T/W/Th/F.  Writer called Keyon again and found the other days are staffed through [facility] ([phone]).  Conversed with Cristy from [facility] ([phone]).  Her nurse that was supposed to work this weekend has  quit, so she is working on getting this staffed.  Savannah wanted stretcher transport.  This has been set-up for 10:30am tomorrow.  Both pt's PCA and his Nurse Deshaun will be at pt's home.  Any new medications should be filled here and sent with pt.  Orders will need to be faxed to [facility] at ([phone])    PLAN  Financial costs for the patient include:NA  Patient/family is agreeable to the plan?  Yes: home tomorrow          Patient anticipates needs for home equipment: No  Plan/Disposition: Home   Appointments:     PCP: 6/4/19 at 11:00am  Care  (CTS) will continue to follow as needed.

## 2019-05-29 NOTE — PROGRESS NOTES
[facility]    Hospitalist Progress Note    Date of Service (when I saw the patient): 05/29/2019    Assessment & Plan   Keyon Farias is a 58 year old male who was admitted on 5/26/2019.    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     PLAN     1) Suspected recurrent aspiration pneumonia causing severe sepsis: He has been hospitalized here 9 times since 12/2018 for recurrent sepsis due to recurrent aspiration pneumonia. Blood cultures once from 1/2019 and again 2/2019 were positive for different types of coagulase negative Staph organisms, likely contaminants; no subsequent blood cultures have been positive for pathogens. TTE 4/2019 showed preserved LVEF without wall motion abnormalities. Most recently he was hospitalized a tenth time from 5/6 through 5/9/2019 for right lower lobe aspiration pneumonia, seen on a CT; tube feeds were held and he was given Zosyn, transitioned to Augmentin. Tube feeds were resumed and he was discharged back to his facility.     Now he presents for recurrent cough and fever and CXR shows possible increase in size of his right lower lobe infiltrate. His WBC have risen since discharge and Lactic acid is elevated; thus he could have early onset severe sepsis due to recurrent aspiration pneumonia.    --. Continue Zosyn empirically for now. Duonebs scheduled q4 hours, Albuterol nebs every 2 hours as needed.   Monitor oxygenation.  Will swicth zosyn to augmentin via J tube today   Added reglan 10mg via J tube TID to help with gut motility to prevent aspiration    Aspirate G tube every shift to empty the stomach contents to prevent aspiration as well  Zosyn was switched to augmentin BID today            2) Pan-hypopituitarism: We will treat empirically for relative adrenal insufficiency.   resumed home PO Hydrocortisone  3) Hyponatremia: Possibly pre-renal, vs SIADH:    stopped IVF today   Sodium normalized with IVF   3. UTI:  Urine culture grew>100K Pseudomonas   Started cipro 500mg PO BID      3) TBI, spastic hemiplegia, severe dysphagia:     -- Fall precautions     -- Nutrition services consulted. Tube feeds restarted on 5/27 7pm to 7Am      -- Maintain indwelling Lerma catheter     4) Enlarging pancreatic tail cyst: Reportedly in 2017 he had necrotizing pancreatitis requiring cystgastrostomy with stent placement and subsequent removal (as per EUS/EGD 1/2017 and 2/2017). Prior EGD 2014 had shown gastritis and esophagitis. More recently on 5/2018 follow up CT noted a cyst in the pancreatic tail. At his most recent hospitalization CT, this cyst was seen to have grown to 3.6 cm (prior size not noted in the Radiology read). This could be a cystic malignancy given recent increase in size, vs more benign type of cyst.    Discussed with  recommended follow up CT scan in 6months to follow the cyst        5) Seizure disorder: Resumed Tegretol      6.  Hypothyroidism.  Resumed Synthroid      7.  Hypogonadotropic hypogonadism.  Resume PTA Testosterone injection at discharge             DVT Prophylaxis: Enoxaparin (Lovenox) SQ  Code Status: Full Code    Disposition: Expected discharge in 1-2days to home with home care services     Starr Champion MD  [phone] (P)      Interval History   Doing well. Occasional cough. Tolerating tube feeds     -Data reviewed today: I reviewed all new labs and imaging results over the last 24 hours. I personally reviewed no images or EKG's today.    Physical Exam   Temp: 97.7  F (36.5  C) Temp src: Axillary BP: 109/69   Heart Rate: 111 Resp:  18 SpO2: 97 % O2 Device: None (Room air)    Vitals:    05/26/19 2140 05/29/19 0500   Weight: 81.6 kg (180 lb) 74.9 kg (165 lb 2 oz)     Vital Signs with Ranges  Temp:  [97.7  F (36.5  C)-98.5  F (36.9  C)] 97.7  F (36.5  C)  Heart Rate:  [] 111  Resp:  [18] 18  BP: (109-136)/(69-79) 109/69  SpO2:  [94 %-97 %] 97 %  I/O last 3 completed shifts:  In: 1130 [NG/GT:1130]  Out: -     Constitutional: Awake, alert, cooperative, no apparent distress  Respiratory: Clear to auscultation bilaterally, no crackles or wheezing  Cardiovascular: Regular rate and rhythm, normal S1 and S2, and no murmur noted  GI: Normal bowel sounds, soft, non-distended, non-tender  Skin/Integumen: No rashes, no cyanosis, no edema  Other:     Medications     IV fluid REPLACEMENT ONLY         amoxicillin-clavulanate  1 tablet Oral Q12H ALONDRA     ciprofloxacin  500 mg Oral Q12H ALONDRA     enoxaparin  40 mg Subcutaneous Q24H     fiber modular (NUTRISOURCE FIBER)  1 packet Per Feeding Tube Daily     hydrocortisone  10 mg Per Feeding Tube Daily with supper     hydrocortisone  20 mg Per Feeding Tube QAM     ipratropium - albuterol 0.5 mg/2.5 mg/3 mL  3 mL Nebulization 4x Daily     levothyroxine  137 mcg Per Feeding Tube Daily     metoclopramide  10 mg Per J Tube TID     pantoprazole  40 mg Per J Tube Daily     sodium chloride (PF)  10 mL Intracatheter Q8H     sodium chloride (PF)  10 mL Intracatheter Q8H     sodium chloride (PF)  3 mL Intracatheter Q8H       Data   Recent Labs   Lab 05/28/19  0727 05/27/19  0853 05/26/19  2326 05/26/19  2218   WBC 10.0 12.5* 11.9*  --    HGB 12.1* 12.4* 12.0*  --    MCV 86 83 83  --     161 154  --    NA  --  137  --  129*   POTASSIUM  --  4.5  --  4.5   CHLORIDE  --  107  --  98   CO2  --  26  --  24   BUN  --  10  --  12   CR  --  0.81  --  0.78   ANIONGAP  --  4  --  7   STEVE  --  8.8  --  8.2*   GLC  --  128*  --  103*   ALBUMIN  --  2.9*  --  2.9*   PROTTOTAL  --  7.5  --  7.0   BILITOTAL  --  0.8  --  0.5    ALKPHOS  --  99  --  97   ALT  --  18  --  19   AST  --  15  --  17       No results found for this or any previous visit (from the past 24 hour(s)).

## 2019-05-29 NOTE — PROGRESS NOTES
No new GI complaint patient is tolerating his tube feeding well patient is responding to her current treatment plan.    Beny Davis MD

## 2019-05-29 NOTE — CONSULTS
Received pharmacy consult that pt will be on Cipro BID and need to hold TF 1 hr before and 1 hour after administration.  Pt is on a daytime feeding from 7 am to 7 pm, discussed with Justin Vega, she will adjust the timing of the meds.    Unique Schultz RD  Pager [phone] (M-F)            [phone] (W/E & Hol)

## 2019-05-29 NOTE — PROGRESS NOTES
Discussed aspirating of G-port with Dr Davis.  He felt due to aspiration contents of 2oml or less that we no longer needed to do the aspirations.  RN caring for pt is aware and will help with ensuring communication is given for discharge at 10:30am tomorrow.  In further discussion with pt's PCA Keyon Dover, the weekend 12 hr nurse has been covered by [facility]. In discussion with Keyon, that has been a caregiver for pt for over 15 years, he feels the only time he aspirates is when he vomits.  They have difficulty suctioning him at home due to pt's resistance and trying to hit the nurse that is trying to suction.

## 2019-05-29 NOTE — PROGRESS NOTES
DATE & TIME: 2300-0730    ORIENTATION: nonverbal; alert and follows commands    BEHAVIOR & AGGRESSION TOOL COLOR: GREEN. Can get agitated and swing fist when cleaning his bottom; pt likes to wipe his front.   CIWA SCORE:    ABNL VS/O2: VSS. RA  MOBILITY: Ax2; lift team. Hemiplegic   PAIN MANAGMENT: nonverbal indicators of pain absent  DIET: NPO; tube feedings 7-7   BOWEL/BLADDER: incontinent   ABNL LAB/BG:   DRAIN/DEVICES: PEG tube present. Midline present.   TELEMETRY RHYTHM:   SKIN: blanchable red coccyx; mepilex in place; CDI   TESTS/PROCEDURES:   D/C DAY/GOALS/PLACE: Discharge in 1-2 days if remains stable   OTHER IMPORTANT INFO: thick white secretions stuck on roof of mouth; oral cares provided. PEG tube flushed and aspirated on shift. Zosyn running IV (switching to Augmentin via PEG?). CT follow up in 6 months of cyst.

## 2019-05-29 NOTE — PLAN OF CARE
DATE & TIME: 5- [id number]  ORIENTATION: HECTOR; nonverbal, moans.   BEHAVIOR & AGGRESSION TOOL COLOR: GREEN  CIWA SCORE:           ABNL VS/O2: VSS on RA  MOBILITY:Total care; hemiplegic. Turn and repo every 2 hours.  PAIN MANAGMENT:Nonverbal indicators of pain absent.   DIET: Strict NPO; TF stopped at 1900 Tolerated well.   BOWEL/BLADDER: Incontinent of urine. No BM this shift.   ABNL LAB/BG:   DRAIN/DEVICES:L arm Midline. PEG tube present.   TELEMETRY RHYTHM:   SKIN: Mepilex on coccyx; CDI   TESTS/PROCEDURES:   D/C DAY/GOALS/PLACE:Pending   OTHER IMPORTANT INFO: Gives nonverbal cues like nodding and thumbs up.Contact isolation maintained.

## 2019-05-29 NOTE — PROGRESS NOTES
Planning potential discharge home today.  Care Coordinator has contacted his Mother Savannah, that is his primary caregiver and Guardian.  She is presently in a rehab facility.  Keyon has been receiving around the clock care through [facility] with a nurse 12 hours during the day and a PCA at night.  Savannah is not sure if they will be able to staff for today.  She is going to call [facility] and then call writer back.    Addendum:Please see Care Transition initial consult note for correct information concerning home care agency

## 2019-05-30 VITALS
TEMPERATURE: 97.9 F | OXYGEN SATURATION: 97 % | SYSTOLIC BLOOD PRESSURE: 122 MMHG | WEIGHT: 164.24 LBS | HEART RATE: 73 BPM | RESPIRATION RATE: 18 BRPM | BODY MASS INDEX: 22.28 KG/M2 | DIASTOLIC BLOOD PRESSURE: 69 MMHG

## 2019-05-30 LAB
GLUCOSE BLDC GLUCOMTR-MCNC: 126 MG/DL (ref 70–99)
GLUCOSE BLDC GLUCOMTR-MCNC: 93 MG/DL (ref 70–99)

## 2019-05-30 PROCEDURE — 25000125 ZZHC RX 250: Performed by: HOSPITALIST

## 2019-05-30 PROCEDURE — A9270 NON-COVERED ITEM OR SERVICE: HCPCS | Performed by: INTERNAL MEDICINE

## 2019-05-30 PROCEDURE — 27210429 ZZH NUTRITION PRODUCT INTERMEDIATE LITER

## 2019-05-30 PROCEDURE — 00000146 ZZHCL STATISTIC GLUCOSE BY METER IP

## 2019-05-30 PROCEDURE — 25000132 ZZH RX MED GY IP 250 OP 250 PS 637: Performed by: INTERNAL MEDICINE

## 2019-05-30 PROCEDURE — 94640 AIRWAY INHALATION TREATMENT: CPT

## 2019-05-30 PROCEDURE — 40000239 ZZH STATISTIC VAT ROUNDS

## 2019-05-30 PROCEDURE — 99239 HOSP IP/OBS DSCHRG MGMT >30: CPT | Performed by: INTERNAL MEDICINE

## 2019-05-30 RX ORDER — CIPROFLOXACIN 500 MG/1
500 TABLET, FILM COATED ORAL EVERY 12 HOURS
Qty: 12 TABLET | Refills: 0 | Status: ON HOLD | OUTPATIENT
Start: 2019-05-30 | End: 2019-06-20

## 2019-05-30 RX ORDER — METOCLOPRAMIDE HYDROCHLORIDE 5 MG/5ML
10 SOLUTION ORAL 3 TIMES DAILY
Qty: 240 ML | Refills: 0 | Status: SHIPPED | OUTPATIENT
Start: 2019-05-30 | End: 2019-08-12

## 2019-05-30 RX ADMIN — CIPROFLOXACIN HYDROCHLORIDE 500 MG: 500 TABLET, FILM COATED ORAL at 06:56

## 2019-05-30 RX ADMIN — AMOXICILLIN AND CLAVULANATE POTASSIUM 1 TABLET: 875; 125 TABLET, FILM COATED ORAL at 09:17

## 2019-05-30 RX ADMIN — LEVOTHYROXINE SODIUM 137 MCG: 112 TABLET ORAL at 09:17

## 2019-05-30 RX ADMIN — HYDROCORTISONE 20 MG: 20 TABLET ORAL at 09:17

## 2019-05-30 RX ADMIN — Medication 40 MG: at 09:19

## 2019-05-30 RX ADMIN — IPRATROPIUM BROMIDE AND ALBUTEROL SULFATE 3 ML: .5; 3 SOLUTION RESPIRATORY (INHALATION) at 07:42

## 2019-05-30 RX ADMIN — METOCLOPRAMIDE 10 MG: 5 SOLUTION ORAL at 09:19

## 2019-05-30 ASSESSMENT — ACTIVITIES OF DAILY LIVING (ADL)
ADLS_ACUITY_SCORE: 45

## 2019-05-30 NOTE — PROGRESS NOTES
Discharge packet given to patient. PCA was called and discharge information was given. Questions answered. Heatheast took pt back to pt home.

## 2019-05-30 NOTE — PROGRESS NOTES
Orders have been faxed to [facility] (fax [phone]).  RN caring for pt will call pt's PCA with the discharge orders.   Home Care is aware of discharge today.

## 2019-05-30 NOTE — PLAN OF CARE
ORIENTATION: nonverbal; alert and follows commands    BEHAVIOR & AGGRESSION TOOL COLOR: GREEN. Can get agitated and swing fist when oral cares or cleaning his bottom/memo area.   CIWA SCORE: n/a        ABNL VS/O2: VSS on RA   MOBILITY: Ax2; lift. Hemiplegic   PAIN MANAGMENT: Nonverbal indicators of pain absent, denies pain with head nodding.  DIET: NPO; tube feeding 7a-7p, se notes below pertaining to G-tube aspirations.          BOWEL/BLADDER:Incontinent of bowel and bladder.   ABNL LAB/BG: BG 93 overnight  DRAIN/DEVICES: PEG tube present. Midline present, SL.   TELEMETRY RHYTHM:   SKIN: Blanchable red coccyx; mepilex in place; CDI   TESTS/PROCEDURES:   D/C DAY/GOALS/PLACE: Discharge today 5/30/19 at 10:30, per notes/report.  OTHER IMPORTANT INFO:Discontinue GJ tube aspirations per GI. Pt started on Cipro BID, TF needs to be held for one hour before and after. 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

## 2019-05-30 NOTE — PLAN OF CARE
DATE & TIME:5-29-19 AM 5162-8243  ORIENTATION: nonverbal; alert and follows commands    BEHAVIOR & AGGRESSION TOOL COLOR: GREEN. Can get agitated and swing fist when oral cares or cleaning his bottom/memo area. Pt likes to do wiping on his own  CIWA SCORE:           ABNL VS/O2: VSS on RA   MOBILITY: Ax2; lift team. Hemiplegic   PAIN MANAGMENT: Nonverbal indicators of pain absent  DIET: NPO; tube feeding stopped at 8pm. HOB above 30           BOWEL/BLADDER:Incontinent of bowel and bladder.   ABNL LAB/BG:   DRAIN/DEVICES: PEG tube present. Midline present.   TELEMETRY RHYTHM:   SKIN: Blanchable red coccyx; mepilex in place; CDI   TESTS/PROCEDURES:   D/C DAY/GOALS/PLACE: Discharge tomorrow 5/30/19 at 10:30.  OTHER IMPORTANT INFO:Discontinue GJ tube aspirations per GI. Pt started on Cipro BID, TF needs to be held for one hour before and after. Contact ISO

## 2019-05-30 NOTE — DISCHARGE SUMMARY
[facility]  Discharge Summary        Keyon Farias MRN# [id number]   YOB: 1960 Age: 58 year old     Date of Admission:  5/26/2019  Date of Discharge:  5/30/2019  Admitting Physician:  Tyler Garcia MD  Discharge Physician: Starr Champion MD  Discharging Service: Hospitalist     Primary Provider: Carlos Gomez  Primary Care Physician Phone Number: [phone]         Discharge Diagnoses/Problem Oriented Hospital Course (Providers):    Keyon Farias was admitted on 5/26/2019 by Tyler Garcia MD and I would refer you to their history and physical.  The following problems were addressed during his hospitalization:  Assessment & Plan     Keyon Farias is a 58 year old male who was admitted on 5/26/2019.     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     PLAN     1)sepsis without acute organ dysfunction secondary to  Suspected recurrent aspiration pneumonia : He has been hospitalized here 9 times since 12/2018 for recurrent sepsis due to recurrent aspiration pneumonia. Blood cultures once from 1/2019 and again 2/2019 were positive for different types of coagulase negative Staph organisms, likely contaminants; no subsequent blood cultures have been positive for pathogens. TTE 4/2019 showed preserved LVEF without wall motion abnormalities. Most recently he was hospitalized a tenth time from 5/6 through 5/9/2019 for right lower lobe aspiration pneumonia, seen on a CT; tube feeds were held and he was given Zosyn, transitioned to Augmentin. Tube feeds were resumed and he  was discharged back to his facility.     Now he presents for recurrent cough and fever and CXR shows possible increase in size of his right lower lobe infiltrate. His WBC have risen since discharge and Lactic acid is elevated; thus he could have early onset severe sepsis due to recurrent aspiration pneumonia.    --. Continue Zosyn empirically for now. Duonebs scheduled q4 hours, Albuterol nebs every 2 hours as needed.   Monitor oxygenation.  Will swicth zosyn to augmentin via J tube today   Added reglan 10mg via J tube TID to help with gut motility to prevent aspiration   Aspirate G tube every shift to empty the stomach contents to prevent aspiration as well. Out put was minimal on gastic tube aspirate so it was stopped on discharge   Zosyn was switched to augmentin BID on 5/29         2) Pan-hypopituitarism: We will treat empirically for relative adrenal insufficiency.   resumed home PO Hydrocortisone  3) Hyponatremia: Possibly pre-renal, vs SIADH:    stopped IVF today   Sodium normalized with IVF   3. UTI:  Urine culture grew>100K Pseudomonas   Started cipro 500mg PO BID      3) TBI, spastic hemiplegia, severe dysphagia:     -- Fall precautions     -- Nutrition services consulted. Tube feeds restarted on 5/27 7pm to 7Am      -- Maintain indwelling Lerma catheter     4) Enlarging pancreatic tail cyst: Reportedly in 2017 he had necrotizing pancreatitis requiring cystgastrostomy with stent placement and subsequent removal (as per EUS/EGD 1/2017 and 2/2017). Prior EGD 2014 had shown gastritis and esophagitis. More recently on 5/2018 follow up CT noted a cyst in the pancreatic tail. At his most recent hospitalization CT, this cyst was seen to have grown to 3.6 cm (prior size not noted in the Radiology read). This could be a cystic malignancy given recent increase in size, vs more benign type of cyst.     Discussed with  recommended follow up CT scan in 6months to follow the cyst         5) Seizure disorder:  Resumed Tegretol      6.  Hypothyroidism.  Resumed Synthroid      7.  Hypogonadotropic hypogonadism.  Resume PTA Testosterone injection at discharge                 DVT Prophylaxis: Enoxaparin (Lovenox) SQ  Code Status: Full Code     Disposition: home today with [facility] services      Starr Champion MD  [phone] (P)                 Code Status:      Full Code        Brief Hospital Stay Summary Sent Home With Patient in AVS:        Reason for your hospital stay      Aspiration pNeumonia                 Important Results:      See below         Pending Results:        Unresulted Labs Ordered in the Past 30 Days of this Admission     Date and Time Order Name Status Description    5/26/2019 2325 Blood culture Preliminary     5/26/2019 2141 Blood culture Preliminary             Discharge Instructions and Follow-Up:      Follow-up Appointments     Follow-up and recommended labs and tests       Follow up with primary care provider, Carlos Gomez, as scheduled on   Tuesday 6/4/19 at 11:00am for hospital follow- up.  No follow up labs or   test are needed.  Please bring your insurance card with you to this   appointment.    You will need a follow up CAT scan of your abdomen with contrast in 6   months for pancreatic tail cyst follow up               Discharge Disposition:      Discharged to home        Discharge Medications:        Discharge Medication List as of 5/30/2019 10:37 AM      START taking these medications    Details   amoxicillin-clavulanate (AUGMENTIN) 875-125 MG tablet Take 1 tablet by mouth every 12 hours for 5 days, Disp-10 tablet, R-0, E-Prescribe      ciprofloxacin (CIPRO) 500 MG tablet Take 1 tablet (500 mg) by mouth every 12 hours for 6 days, Disp-12 tablet, R-0, E-Prescribe      metoclopramide (REGLAN) 5 MG/5ML solution 10 mLs (10 mg) by Per J Tube route 3 times daily, Disp-240 mL, R-0, E-Prescribe         CONTINUE these medications which have NOT CHANGED    Details   acetaminophen (TYLENOL) 500 MG tablet  1,000 mg by Oral or FT or NG tube route every 6 hours as needed for mild pain, Historical      acetylcysteine (MUCOMYST) 20 % neb solution Take 2 mLs by nebulization 4 times daily, Disp-300 vial, R-0, E-Prescribe      albuterol (PROVENTIL) (5 MG/ML) 0.5% neb solution Take 2.5 mg by nebulization 4 times daily 0700 1100 1500 1900 with mucomyst, Historical      bacitracin ointment Apply topically daily as needed for wound care To PEG site. Historical      Brivaracetam (BRIVIACT) 10 MG/ML solution 100 mg by Oral or FT or NG tube route 2 times daily @0900 and 2100, Historical      calcium carbonate 1250 (500 CA) MG/5ML SUSP suspension 5 mLs (1,250 mg) by Per J Tube route 3 times daily (with meals), Disp-450 mL, Transitional      carBAMazepine (TEGRETOL) 100 MG/5ML suspension Take 150 mg by mouth every 6 hours At 06:00, 12:00, 18:00 and 24:00 for seizures, Historical      ferrous sulfate 220 (44 Fe) MG/5ML ELIX 220 mg by Per Feeding Tube route daily, Historical      glycopyrrolate (GLYCATE) 2 MG tablet Take 1-2 mg by mouth 2 times daily as needed (secretions) 1/2 to 1 tablet (1 - 2 mg) up to twice daily if needed for secretions., Historical      !! hydrocortisone (CORTEF) 5 MG tablet 10 mg by Oral or FT or NG tube route daily (with dinner) At 1500, Historical      !! hydrocortisone (CORTEF) 5 MG tablet 20 mg by Oral or FT or NG tube route every morning , Historical      levothyroxine (SYNTHROID/LEVOTHROID) 137 MCG tablet 137 mcg by Oral or FT or NG tube route daily @ 05:00, Historical      melatonin (MELATONIN) 1 MG/ML LIQD liquid 6 mg by Per NG tube route At Bedtime , Historical      Multiple Vitamins-Minerals (CENTRUM SILVER) per tablet Take 1 tablet by mouth daily Crush and feed via j-tube, Historical      pantoprazole (PROTONIX) 2 mg/mL SUSP suspension 20 mLs (40 mg) by Per J Tube route daily, Disp-400 mL, R-1, E-Prescribe      potassium & sodium phosphates (NEUTRA-PHOS) 280-160-250 MG Packet Take 1 packet by mouth 3  times daily 0900, 1500, 2100. , Historical      testosterone cypionate (DEPOTESTOTERONE CYPIONATE) 200 MG/ML injection Inject 76 mg into the muscle See Admin Instructions Every 2 weeks on Fridays   76 mg or 0.38 mL, Historical      Vitamin D, Cholecalciferol, 1000 units TABS Take 2,000 Units by mouth every morning Crush and feed via j-tube, Historical      Guar Gum (FIBER MODULAR, NUTRISOURCE FIBER,) packet 1 packet by Per G Tube route daily, Disp-30 packet, R-0, E-Prescribe      order for DME Equipment being ordered: NebulizerDisp-1 Units, R-0, Local Print       !! - Potential duplicate medications found. Please discuss with provider.            Allergies:         Allergies   Allergen Reactions     Dilantin [Phenytoin Sodium]      Valproic Acid      Toxicity c bone marrow suspension, elevated ammonia levels            Consultations This Hospital Stay:      Consultation during this admission received from gastroenterology        Condition and Physical on Discharge:      Discharge condition: Stable   Vitals: Blood pressure 122/69, pulse 73, temperature 97.9  F (36.6  C), temperature source Oral, resp. rate 18, weight 74.5 kg (164 lb 3.9 oz), SpO2 97 %.     Constitutional: Alert and awake    Lungs: CTA   Cardiovascular: RRR   Abdomen: Soft, NT, ND, BS+   Skin: Warm and dry    Other:          Discharge Time:      Greater than 30 minutes.        Image Results From This Hospital Stay (For Non-EPIC Providers):        Results for orders placed or performed during the hospital encounter of 05/26/19   XR Chest 2 Views    Narrative    CHEST TWO VIEWS    5/26/2019 10:51 PM     HISTORY: History of aspiration pneumonia.      COMPARISON: 5/16/2019.      Impression    IMPRESSION: Increased hazy consolidation in the right lower lung  concerning for pneumonia. There are small bilateral pleural effusions  which may be slightly increased since prior. Lung volumes remain low.  Cardiac silhouette is unchanged.      YELENA MAE MD            Most Recent Lab Results In EPIC (For Non-EPIC Providers):    Most Recent 3 CBC's:  Recent Labs   Lab Test 05/28/19  0727 05/27/19  0853 05/26/19  2326   WBC 10.0 12.5* 11.9*   HGB 12.1* 12.4* 12.0*   MCV 86 83 83    161 154      Most Recent 3 BMP's:  Recent Labs   Lab Test 05/27/19  0853 05/26/19  2218 05/16/19  2234    129* 141   POTASSIUM 4.5 4.5 3.6   CHLORIDE 107 98 103   CO2 26 24 33*   BUN 10 12 15   CR 0.81 0.78 0.94   ANIONGAP 4 7 5   STEVE 8.8 8.2* 8.6   * 103* 90     Most Recent 3 Troponin's:  Recent Labs   Lab Test 01/22/17  0500 01/21/17  2348 01/21/17  1600   TROPI 0.017 0.025 0.028     Most Recent 3 INR's:  Recent Labs   Lab Test 02/07/17  0452 01/30/17  0340 01/25/17  0426   INR 1.27* 1.32* 1.49*     Most Recent 2 LFT's:  Recent Labs   Lab Test 05/27/19  0853 05/26/19  2218   AST 15 17   ALT 18 19   ALKPHOS 99 97   BILITOTAL 0.8 0.5     Most Recent Cholesterol Panel:  Recent Labs   Lab Test 11/29/16  0430   TRIG 100     Most Recent 6 Bacteria Isolates From Any Culture (See EPIC Reports for Culture Details):  Recent Labs   Lab Test 05/27/19  0241 05/26/19  2327 05/26/19  2232 05/07/19  0002 05/06/19  2310 05/06/19  2257   CULT No growth after 2 days No growth after 2 days >100,000 colonies/mL  Pseudomonas aeruginosa  * No growth No growth No growth     Most Recent TSH, T4 and HgbA1c:   Recent Labs   Lab Test 05/07/19  0747  07/05/18  0021   TSH  --   --  <0.01*   T4  --   --  1.66*   A1C 6.1*   < >  --     < > = values in this interval not displayed.

## 2019-06-02 LAB
BACTERIA SPEC CULT: NO GROWTH
BACTERIA SPEC CULT: NO GROWTH
Lab: NORMAL
SPECIMEN SOURCE: NORMAL
SPECIMEN SOURCE: NORMAL

## 2019-06-17 ENCOUNTER — APPOINTMENT (OUTPATIENT)
Dept: GENERAL RADIOLOGY | Facility: CLINIC | Age: 59
DRG: 871 | End: 2019-06-17
Attending: EMERGENCY MEDICINE
Payer: MEDICARE

## 2019-06-17 ENCOUNTER — HOSPITAL ENCOUNTER (INPATIENT)
Facility: CLINIC | Age: 59
LOS: 3 days | Discharge: HOME HEALTH | DRG: 871 | End: 2019-06-20
Attending: EMERGENCY MEDICINE | Admitting: HOSPITALIST
Payer: MEDICARE

## 2019-06-17 DIAGNOSIS — J69.0 ASPIRATION PNEUMONIA OF RIGHT LOWER LOBE, UNSPECIFIED ASPIRATION PNEUMONIA TYPE (H): ICD-10-CM

## 2019-06-17 DIAGNOSIS — J69.0 ASPIRATION PNEUMONIA OF BOTH LOWER LOBES DUE TO GASTRIC SECRETIONS (H): Primary | ICD-10-CM

## 2019-06-17 DIAGNOSIS — J18.9 PNEUMONIA OF BOTH LOWER LOBES DUE TO INFECTIOUS ORGANISM: ICD-10-CM

## 2019-06-17 LAB
ALBUMIN SERPL-MCNC: 3.2 G/DL (ref 3.4–5)
ALBUMIN UR-MCNC: 10 MG/DL
ALP SERPL-CCNC: 110 U/L (ref 40–150)
ALT SERPL W P-5'-P-CCNC: 24 U/L (ref 0–70)
ANION GAP SERPL CALCULATED.3IONS-SCNC: 7 MMOL/L (ref 3–14)
APPEARANCE UR: CLEAR
AST SERPL W P-5'-P-CCNC: 14 U/L (ref 0–45)
BASOPHILS # BLD AUTO: 0 10E9/L (ref 0–0.2)
BASOPHILS NFR BLD AUTO: 0.3 %
BILIRUB SERPL-MCNC: 0.2 MG/DL (ref 0.2–1.3)
BILIRUB UR QL STRIP: NEGATIVE
BUN SERPL-MCNC: 14 MG/DL (ref 7–30)
CALCIUM SERPL-MCNC: 8.5 MG/DL (ref 8.5–10.1)
CHLORIDE SERPL-SCNC: 98 MMOL/L (ref 94–109)
CO2 BLDCOV-SCNC: 24 MMOL/L (ref 21–28)
CO2 BLDCOV-SCNC: 24 MMOL/L (ref 21–28)
CO2 SERPL-SCNC: 28 MMOL/L (ref 20–32)
COLOR UR AUTO: YELLOW
CREAT SERPL-MCNC: 0.72 MG/DL (ref 0.66–1.25)
DIFFERENTIAL METHOD BLD: ABNORMAL
EOSINOPHIL # BLD AUTO: 0.3 10E9/L (ref 0–0.7)
EOSINOPHIL NFR BLD AUTO: 2.4 %
ERYTHROCYTE [DISTWIDTH] IN BLOOD BY AUTOMATED COUNT: 15.3 % (ref 10–15)
GFR SERPL CREATININE-BSD FRML MDRD: >90 ML/MIN/{1.73_M2}
GLUCOSE SERPL-MCNC: 82 MG/DL (ref 70–99)
GLUCOSE UR STRIP-MCNC: NEGATIVE MG/DL
HCT VFR BLD AUTO: 37 % (ref 40–53)
HGB BLD-MCNC: 12.4 G/DL (ref 13.3–17.7)
HGB UR QL STRIP: NEGATIVE
IMM GRANULOCYTES # BLD: 0 10E9/L (ref 0–0.4)
IMM GRANULOCYTES NFR BLD: 0.1 %
KETONES UR STRIP-MCNC: 5 MG/DL
LACTATE BLD-SCNC: 2 MMOL/L (ref 0.7–2.1)
LACTATE BLD-SCNC: 2.5 MMOL/L (ref 0.7–2.1)
LEUKOCYTE ESTERASE UR QL STRIP: ABNORMAL
LYMPHOCYTES # BLD AUTO: 1.3 10E9/L (ref 0.8–5.3)
LYMPHOCYTES NFR BLD AUTO: 12.2 %
MCH RBC QN AUTO: 27.7 PG (ref 26.5–33)
MCHC RBC AUTO-ENTMCNC: 33.5 G/DL (ref 31.5–36.5)
MCV RBC AUTO: 83 FL (ref 78–100)
MONOCYTES # BLD AUTO: 0.7 10E9/L (ref 0–1.3)
MONOCYTES NFR BLD AUTO: 7 %
NEUTROPHILS # BLD AUTO: 8.3 10E9/L (ref 1.6–8.3)
NEUTROPHILS NFR BLD AUTO: 78 %
NITRATE UR QL: NEGATIVE
NRBC # BLD AUTO: 0 10*3/UL
NRBC BLD AUTO-RTO: 0 /100
PCO2 BLDV: 37 MM HG (ref 40–50)
PCO2 BLDV: 39 MM HG (ref 40–50)
PH BLDV: 7.39 PH (ref 7.32–7.43)
PH BLDV: 7.41 PH (ref 7.32–7.43)
PH UR STRIP: 8.5 PH (ref 5–7)
PLATELET # BLD AUTO: 183 10E9/L (ref 150–450)
PO2 BLDV: 28 MM HG (ref 25–47)
PO2 BLDV: 35 MM HG (ref 25–47)
POTASSIUM SERPL-SCNC: 4.6 MMOL/L (ref 3.4–5.3)
PROT SERPL-MCNC: 7.9 G/DL (ref 6.8–8.8)
RBC # BLD AUTO: 4.48 10E12/L (ref 4.4–5.9)
RBC #/AREA URNS AUTO: 2 /HPF (ref 0–2)
SAO2 % BLDV FROM PO2: 54 %
SAO2 % BLDV FROM PO2: 68 %
SODIUM SERPL-SCNC: 133 MMOL/L (ref 133–144)
SOURCE: ABNORMAL
SP GR UR STRIP: 1.01 (ref 1–1.03)
UROBILINOGEN UR STRIP-MCNC: 4 MG/DL (ref 0–2)
WBC # BLD AUTO: 10.7 10E9/L (ref 4–11)
WBC #/AREA URNS AUTO: 25 /HPF (ref 0–5)

## 2019-06-17 PROCEDURE — 25000128 H RX IP 250 OP 636: Performed by: EMERGENCY MEDICINE

## 2019-06-17 PROCEDURE — 99223 1ST HOSP IP/OBS HIGH 75: CPT | Mod: AI | Performed by: HOSPITALIST

## 2019-06-17 PROCEDURE — 25000132 ZZH RX MED GY IP 250 OP 250 PS 637: Mod: GY | Performed by: HOSPITALIST

## 2019-06-17 PROCEDURE — 83605 ASSAY OF LACTIC ACID: CPT

## 2019-06-17 PROCEDURE — 87040 BLOOD CULTURE FOR BACTERIA: CPT | Performed by: EMERGENCY MEDICINE

## 2019-06-17 PROCEDURE — 94640 AIRWAY INHALATION TREATMENT: CPT

## 2019-06-17 PROCEDURE — 80053 COMPREHEN METABOLIC PANEL: CPT | Performed by: EMERGENCY MEDICINE

## 2019-06-17 PROCEDURE — 25000125 ZZHC RX 250: Performed by: HOSPITALIST

## 2019-06-17 PROCEDURE — 99285 EMERGENCY DEPT VISIT HI MDM: CPT | Mod: 25

## 2019-06-17 PROCEDURE — 82803 BLOOD GASES ANY COMBINATION: CPT

## 2019-06-17 PROCEDURE — 25000132 ZZH RX MED GY IP 250 OP 250 PS 637: Mod: GY | Performed by: EMERGENCY MEDICINE

## 2019-06-17 PROCEDURE — 81001 URINALYSIS AUTO W/SCOPE: CPT | Performed by: EMERGENCY MEDICINE

## 2019-06-17 PROCEDURE — 87086 URINE CULTURE/COLONY COUNT: CPT | Performed by: EMERGENCY MEDICINE

## 2019-06-17 PROCEDURE — 12000000 ZZH R&B MED SURG/OB

## 2019-06-17 PROCEDURE — 71046 X-RAY EXAM CHEST 2 VIEWS: CPT

## 2019-06-17 PROCEDURE — 96365 THER/PROPH/DIAG IV INF INIT: CPT

## 2019-06-17 PROCEDURE — A9270 NON-COVERED ITEM OR SERVICE: HCPCS | Mod: GY | Performed by: HOSPITALIST

## 2019-06-17 PROCEDURE — 87088 URINE BACTERIA CULTURE: CPT | Performed by: EMERGENCY MEDICINE

## 2019-06-17 PROCEDURE — A9270 NON-COVERED ITEM OR SERVICE: HCPCS | Mod: GY | Performed by: EMERGENCY MEDICINE

## 2019-06-17 PROCEDURE — 40000275 ZZH STATISTIC RCP TIME EA 10 MIN

## 2019-06-17 PROCEDURE — 96361 HYDRATE IV INFUSION ADD-ON: CPT

## 2019-06-17 PROCEDURE — 25000128 H RX IP 250 OP 636: Performed by: HOSPITALIST

## 2019-06-17 PROCEDURE — 25800030 ZZH RX IP 258 OP 636: Performed by: EMERGENCY MEDICINE

## 2019-06-17 PROCEDURE — 87186 SC STD MICRODIL/AGAR DIL: CPT | Performed by: EMERGENCY MEDICINE

## 2019-06-17 PROCEDURE — 85025 COMPLETE CBC W/AUTO DIFF WBC: CPT | Performed by: EMERGENCY MEDICINE

## 2019-06-17 RX ORDER — ALBUTEROL SULFATE 0.83 MG/ML
2.5 SOLUTION RESPIRATORY (INHALATION) 4 TIMES DAILY
Status: DISCONTINUED | OUTPATIENT
Start: 2019-06-17 | End: 2019-06-20 | Stop reason: HOSPADM

## 2019-06-17 RX ORDER — SODIUM CHLORIDE 9 MG/ML
INJECTION, SOLUTION INTRAVENOUS CONTINUOUS
Status: DISCONTINUED | OUTPATIENT
Start: 2019-06-17 | End: 2019-06-19

## 2019-06-17 RX ORDER — PIPERACILLIN SODIUM, TAZOBACTAM SODIUM 4; .5 G/20ML; G/20ML
4.5 INJECTION, POWDER, LYOPHILIZED, FOR SOLUTION INTRAVENOUS ONCE
Status: COMPLETED | OUTPATIENT
Start: 2019-06-17 | End: 2019-06-17

## 2019-06-17 RX ORDER — AMOXICILLIN 250 MG
1 CAPSULE ORAL 2 TIMES DAILY PRN
Status: DISCONTINUED | OUTPATIENT
Start: 2019-06-17 | End: 2019-06-20 | Stop reason: HOSPADM

## 2019-06-17 RX ORDER — HYDROCORTISONE 10 MG/1
20 TABLET ORAL EVERY MORNING
Status: DISCONTINUED | OUTPATIENT
Start: 2019-06-18 | End: 2019-06-20 | Stop reason: HOSPADM

## 2019-06-17 RX ORDER — HYDROCORTISONE 10 MG/1
10 TABLET ORAL
Status: DISCONTINUED | OUTPATIENT
Start: 2019-06-18 | End: 2019-06-20 | Stop reason: HOSPADM

## 2019-06-17 RX ORDER — ACETYLCYSTEINE 200 MG/ML
2 SOLUTION ORAL; RESPIRATORY (INHALATION) 4 TIMES DAILY
Status: DISCONTINUED | OUTPATIENT
Start: 2019-06-17 | End: 2019-06-20 | Stop reason: HOSPADM

## 2019-06-17 RX ORDER — PIPERACILLIN SODIUM, TAZOBACTAM SODIUM 4; .5 G/20ML; G/20ML
4.5 INJECTION, POWDER, LYOPHILIZED, FOR SOLUTION INTRAVENOUS EVERY 6 HOURS
Status: DISCONTINUED | OUTPATIENT
Start: 2019-06-18 | End: 2019-06-20 | Stop reason: HOSPADM

## 2019-06-17 RX ORDER — ACETAMINOPHEN 650 MG/1
650 SUPPOSITORY RECTAL EVERY 4 HOURS PRN
Status: DISCONTINUED | OUTPATIENT
Start: 2019-06-17 | End: 2019-06-20 | Stop reason: HOSPADM

## 2019-06-17 RX ORDER — ACETAMINOPHEN 325 MG/1
650 TABLET ORAL EVERY 4 HOURS PRN
Status: DISCONTINUED | OUTPATIENT
Start: 2019-06-17 | End: 2019-06-20 | Stop reason: HOSPADM

## 2019-06-17 RX ORDER — PROCHLORPERAZINE 25 MG
25 SUPPOSITORY, RECTAL RECTAL EVERY 12 HOURS PRN
Status: DISCONTINUED | OUTPATIENT
Start: 2019-06-17 | End: 2019-06-20

## 2019-06-17 RX ORDER — SODIUM CHLORIDE, SODIUM LACTATE, POTASSIUM CHLORIDE, CALCIUM CHLORIDE 600; 310; 30; 20 MG/100ML; MG/100ML; MG/100ML; MG/100ML
INJECTION, SOLUTION INTRAVENOUS CONTINUOUS
Status: DISCONTINUED | OUTPATIENT
Start: 2019-06-17 | End: 2019-06-17

## 2019-06-17 RX ORDER — NALOXONE HYDROCHLORIDE 0.4 MG/ML
.1-.4 INJECTION, SOLUTION INTRAMUSCULAR; INTRAVENOUS; SUBCUTANEOUS
Status: DISCONTINUED | OUTPATIENT
Start: 2019-06-17 | End: 2019-06-20 | Stop reason: HOSPADM

## 2019-06-17 RX ORDER — PROCHLORPERAZINE MALEATE 5 MG
10 TABLET ORAL EVERY 6 HOURS PRN
Status: DISCONTINUED | OUTPATIENT
Start: 2019-06-17 | End: 2019-06-20

## 2019-06-17 RX ORDER — CARBAMAZEPINE 100 MG/5ML
150 SUSPENSION ORAL EVERY 6 HOURS
Status: DISCONTINUED | OUTPATIENT
Start: 2019-06-18 | End: 2019-06-18

## 2019-06-17 RX ORDER — BACITRACIN ZINC 500 [USP'U]/G
OINTMENT TOPICAL DAILY PRN
Status: DISCONTINUED | OUTPATIENT
Start: 2019-06-17 | End: 2019-06-20 | Stop reason: HOSPADM

## 2019-06-17 RX ORDER — ACETAMINOPHEN 650 MG/1
650 SUPPOSITORY RECTAL ONCE
Status: COMPLETED | OUTPATIENT
Start: 2019-06-17 | End: 2019-06-17

## 2019-06-17 RX ORDER — AMOXICILLIN 250 MG
2 CAPSULE ORAL 2 TIMES DAILY PRN
Status: DISCONTINUED | OUTPATIENT
Start: 2019-06-17 | End: 2019-06-20 | Stop reason: HOSPADM

## 2019-06-17 RX ORDER — METOCLOPRAMIDE HYDROCHLORIDE 5 MG/5ML
10 SOLUTION ORAL 3 TIMES DAILY
Status: DISCONTINUED | OUTPATIENT
Start: 2019-06-17 | End: 2019-06-20 | Stop reason: HOSPADM

## 2019-06-17 RX ORDER — BISACODYL 10 MG
10 SUPPOSITORY, RECTAL RECTAL DAILY PRN
Status: DISCONTINUED | OUTPATIENT
Start: 2019-06-17 | End: 2019-06-20 | Stop reason: HOSPADM

## 2019-06-17 RX ORDER — ONDANSETRON 2 MG/ML
4 INJECTION INTRAMUSCULAR; INTRAVENOUS EVERY 6 HOURS PRN
Status: DISCONTINUED | OUTPATIENT
Start: 2019-06-17 | End: 2019-06-20 | Stop reason: HOSPADM

## 2019-06-17 RX ORDER — GLYCOPYRROLATE 1 MG/1
1-2 TABLET ORAL 2 TIMES DAILY PRN
Status: DISCONTINUED | OUTPATIENT
Start: 2019-06-17 | End: 2019-06-20 | Stop reason: HOSPADM

## 2019-06-17 RX ORDER — ONDANSETRON 4 MG/1
4 TABLET, ORALLY DISINTEGRATING ORAL EVERY 6 HOURS PRN
Status: DISCONTINUED | OUTPATIENT
Start: 2019-06-17 | End: 2019-06-20 | Stop reason: HOSPADM

## 2019-06-17 RX ADMIN — ALBUTEROL SULFATE 2.5 MG: 2.5 SOLUTION RESPIRATORY (INHALATION) at 23:12

## 2019-06-17 RX ADMIN — SODIUM CHLORIDE, POTASSIUM CHLORIDE, SODIUM LACTATE AND CALCIUM CHLORIDE 650 ML: 600; 310; 30; 20 INJECTION, SOLUTION INTRAVENOUS at 21:11

## 2019-06-17 RX ADMIN — PIPERACILLIN SODIUM,TAZOBACTAM SODIUM 4.5 G: 4; .5 INJECTION, POWDER, FOR SOLUTION INTRAVENOUS at 20:21

## 2019-06-17 RX ADMIN — ACETYLCYSTEINE 4 ML: 200 SOLUTION ORAL; RESPIRATORY (INHALATION) at 23:12

## 2019-06-17 RX ADMIN — ACETAMINOPHEN 650 MG: 650 SUPPOSITORY RECTAL at 18:41

## 2019-06-17 RX ADMIN — BRIVARACETAM 100 MG: 10 SOLUTION ORAL at 22:36

## 2019-06-17 RX ADMIN — SODIUM CHLORIDE, POTASSIUM CHLORIDE, SODIUM LACTATE AND CALCIUM CHLORIDE 2313 ML: 600; 310; 30; 20 INJECTION, SOLUTION INTRAVENOUS at 18:36

## 2019-06-17 RX ADMIN — HYDROCORTISONE SODIUM SUCCINATE 100 MG: 100 INJECTION, POWDER, FOR SOLUTION INTRAMUSCULAR; INTRAVENOUS at 22:31

## 2019-06-17 RX ADMIN — ENOXAPARIN SODIUM 40 MG: 40 INJECTION SUBCUTANEOUS at 22:31

## 2019-06-17 RX ADMIN — METOCLOPRAMIDE 10 MG: 5 SOLUTION ORAL at 22:37

## 2019-06-17 ASSESSMENT — MIFFLIN-ST. JEOR
SCORE: 1607.36
SCORE: 1567.25

## 2019-06-17 ASSESSMENT — ENCOUNTER SYMPTOMS: FEVER: 1

## 2019-06-17 NOTE — ED PROVIDER NOTES
History     Chief Complaint:  Fever     HPI   HPI was obtained largely by the patient's caretaker per EMS.  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 The patient was noticed to have a fever of 104.6 by his 24 hour caretaker. 911 was called. Due to concern, the patient has been taken to the ED for evaluation and treatment via ambulance. Upon arrival, EMS reports the patient being bed ridden, warm to the touch, and nonverbal. The patient did have vomiting today and has been here in the past for elevated temperature multiple times with the last visit being last month.    Allergies:  Dilantin  Valproic acid      Medications:    Tylenol  Mucomyst  Albuterol neb solution  Augmentin  Briviact  Tegretol   Ferrous sulfate  Glycate  Guar gum  Cortef  Levothyroxine  Melatonin  Protonix  Neutra-phos  Depotestosterone cypionate  Cholecalciferol     Past Medical History:    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  Aspiration pneumonia  DVT of upper extremity  GERD  Necrotizing pancreatitis  Panhypopituitarism  Pneumonia  Seizures  Septic shock  Spastic hemiplegia affecting dominant side  Thyroid disease  Unspecified cerebral artery occlusion with cerebral infarction  Ventricular fibrillation  Ventricular tachyarrhythmia  Cardiac arrest  Panhypopituitarism     Past Surgical History:    Endoscopic upper GI ultrasound  EGD x 3  Reconstructive facial surgery  Insertion tube gastrotomy   IR jejunostomy tube change X3  Appendectomy  Right hand repair  Tracheostomy x 2  Vascular surgery     Family History:    Cancer     Social History:  Marital Status:  Single   Smoker:   Former   Smokeless:   Never   Alcohol:   No   Drugs:   No    Review of Systems   Unable to perform ROS: Patient nonverbal   Constitutional: Positive for fever.     Physical Exam     Patient Vitals for the past 24 hrs:   BP  "Temp Temp src Pulse Heart Rate Resp SpO2 Height Weight   06/18/19 0030 97/57 98.4  F (36.9  C) Oral -- 92 20 98 % -- --   06/17/19 2312 -- -- -- -- -- -- 97 % -- --   06/17/19 2139 (!) 81/50 -- -- -- 106 -- 96 % -- 73.1 kg (161 lb 2.5 oz)   06/17/19 2127 (!) 82/47 99.8  F (37.7  C) Oral -- 112 20 -- -- --   06/17/19 2100 103/59 -- -- 117 112 22 94 % -- --   06/17/19 2015 -- 101.7  F (38.7  C) Temporal -- -- -- -- -- --   06/17/19 2000 110/61 -- -- 118 117 22 95 % -- --   06/17/19 1900 114/58 103.5  F (39.7  C) -- 112 114 24 96 % -- --   06/17/19 1819 113/62 105  F (40.6  C) Temporal 121 -- 24 96 % 1.778 m (5' 10\") 77.1 kg (170 lb)     Physical Exam  GENERAL: well developed, pleasant. Feels warm to touch. Prior tracheostomy scar. Mouth open eyes open.   HEAD: atraumatic  EYES: pupils reactive, extraocular muscles intact, conjunctivae normal  ENT:  mucus membranes moist  NECK:  trachea midline, normal range of motion  RESPIRATORY: no tachypnea, diminsshed lung sounds on the left.   CVS: normal S1/S2, no murmurs, intact distal pulses. Tachycardia  ABDOMEN: soft, nontender, nondistention. Feeding tube in the left abdomen.  MUSCULOSKELETAL: no deformities. decreased muslce mass in arms and legs.   SKIN: warm and dry, no acute rashes or ulceration  NEURO: non verbal, eye sopen, mouth open moving his head turning and looking around the room.   PSYCH:  Mood/affect normal    Emergency Department Course   Imaging:  Radiographic findings were communicated with the patient who voiced understanding of the findings.    XR Chest 2 views:   Elevated right diaphragm. Shallow inspiration. Infiltrates  posteriorly in the lungs noted on lateral view, new since previous  exam.   As per radiology.     Laboratory:  VBG: pH 7.41 / PCO2 37 / PO2 28 / Bicarb 24  VBG repeat: pH 7.39 / PCO2 39 / PO2 35 / Bicarb 24   CBC: WBC: 10.7, HGB: 12.4, PLT: 183  UA with Microscopic: ketones 5, pH 8.5, albumin 10, urobilinogen 4.0, leukocyte esterase " moderate, WBC 25 o/w WNL  CMP: albumin 3.2, o/w WNL (Creatinine: 0.72)  Blood cultures (X2): pending  Urine culture aerobic bacterial: pending    Interventions:  1836: Lactated ringers 2,313 mL IV Bolus   1841: Tylenol 650 mg PO suppository   2052: Zosyn 4.5 g IV Infusion  2111: lactated ringers IV in progress    Emergency Department Course:  1824 I performed an exam of the patient as documented above.   1915 I rechecked the patient and discussed the results of their workup thus far.   (1945)  I consulted with Dr. Galan of the hospitalist services. They are in agreement to accept the patient for admission.    Findings and plan explained to the Patient who consents to admission. Discussed the patient with Dr. Galan, who will admit the patient to a [facility] bed for further monitoring, evaluation, and treatment.    Impression & Plan    CMS Diagnoses:    The patient has signs of Severe Sepsis as evidenced by:    1. 2 SIRS criteria, AND  2. Suspected infection, AND   3. Organ dysfunction: Lactic Acid > 1.9    Time severe sepsis diagnosis confirmed = 1835 as this was the time when Lactate resulted, and the level was > 1.9      3 Hour Severe Sepsis Bundle Completion:  1. Initial Lactic Acid Result:   Recent Labs   Lab Test 06/17/19 2050 06/17/19 1835 05/26/19 2218   LACT 2.0 2.5* 2.2*     2. Blood Cultures before Antibiotics: Yes  3. Broad Spectrum Antibiotics Administered: Yes     Anti-infectives (From admission through now)    Start     Dose/Rate Route Frequency Ordered Stop    06/17/19 1956  piperacillin-tazobactam (ZOSYN) 4.5 g vial to attach to  mL bag      4.5 g  over 30 Minutes Intravenous ONCE 06/17/19 1956 06/17/19 2052        4. 30 ml/kg IV fluid bolus  Ideal body weight: 73 kg (160 lb 15 oz)  Adjusted ideal body weight: 73 kg (161 lb 0.4 oz)    Severe Sepsis reassessment:  1. Repeat Lactic Acid Level: pending  2. MAP>65 after initial IVF bolus, will continue to monitor fluid status and vital signs             Medical Decision Making:  Patient presents with fever.  Patient has a history of recurrent aspiration pneumonia.  Lactic acid is slightly elevated.  Pulse rate is also been slightly elevated.  Blood pressures have been stable.  Patient given IV fluids per sepsis protocol,  blood cultures and Zosyn for possible aspiration as his chest x-ray looks concerning for this.  Spoke with the hospitalist regarding admission.  He does not require pressors.    Diagnosis:    ICD-10-CM    1. Aspiration pneumonia of right lower lobe, unspecified aspiration pneumonia type (H) J69.0 Urine Culture Aerobic Bacterial     Blood culture     Blood culture     Lactic acid whole blood     ISTAT gases lactate gabe POCT     ISTAT gases lactate gabe POCT     CANCELED: Lactic acid whole blood     Disposition:  Admitted to [facility] bed    Scribe Disclosure:  Timothy DYKES, am serving as a scribe on 6/17/2019 at 6:24 PM to personally document services performed by Randy Avila MD based on my observations and the provider's statements to me.     Timothy Conde  6/17/2019    EMERGENCY DEPARTMENT       Randy Avila MD  06/18/19 0110

## 2019-06-17 NOTE — ED TRIAGE NOTES
"Pt \"spiked a fever today\" according to EMS. Pt caregiver states temp was 104. Pt coughing intermittently during triage.    "

## 2019-06-17 NOTE — ED NOTES
Bed: ED26  Expected date: 6/17/19  Expected time: 6:05 PM  Means of arrival: Ambulance  Comments:  edian 1 58m fever,bed ridden

## 2019-06-18 LAB
ANION GAP SERPL CALCULATED.3IONS-SCNC: 7 MMOL/L (ref 3–14)
BASOPHILS # BLD AUTO: 0 10E9/L (ref 0–0.2)
BASOPHILS NFR BLD AUTO: 0.2 %
BUN SERPL-MCNC: 11 MG/DL (ref 7–30)
CALCIUM SERPL-MCNC: 8.3 MG/DL (ref 8.5–10.1)
CHLORIDE SERPL-SCNC: 112 MMOL/L (ref 94–109)
CO2 SERPL-SCNC: 22 MMOL/L (ref 20–32)
CREAT SERPL-MCNC: 0.85 MG/DL (ref 0.66–1.25)
DIFFERENTIAL METHOD BLD: ABNORMAL
EOSINOPHIL # BLD AUTO: 0 10E9/L (ref 0–0.7)
EOSINOPHIL NFR BLD AUTO: 0.2 %
ERYTHROCYTE [DISTWIDTH] IN BLOOD BY AUTOMATED COUNT: 15.7 % (ref 10–15)
GFR SERPL CREATININE-BSD FRML MDRD: >90 ML/MIN/{1.73_M2}
GLUCOSE BLDC GLUCOMTR-MCNC: 109 MG/DL (ref 70–99)
GLUCOSE SERPL-MCNC: 105 MG/DL (ref 70–99)
HCT VFR BLD AUTO: 36.2 % (ref 40–53)
HGB BLD-MCNC: 11.9 G/DL (ref 13.3–17.7)
IMM GRANULOCYTES # BLD: 0.1 10E9/L (ref 0–0.4)
IMM GRANULOCYTES NFR BLD: 0.3 %
LYMPHOCYTES # BLD AUTO: 2.5 10E9/L (ref 0.8–5.3)
LYMPHOCYTES NFR BLD AUTO: 13.7 %
MAGNESIUM SERPL-MCNC: 2.2 MG/DL (ref 1.6–2.3)
MCH RBC QN AUTO: 27.5 PG (ref 26.5–33)
MCHC RBC AUTO-ENTMCNC: 32.9 G/DL (ref 31.5–36.5)
MCV RBC AUTO: 84 FL (ref 78–100)
MONOCYTES # BLD AUTO: 1.2 10E9/L (ref 0–1.3)
MONOCYTES NFR BLD AUTO: 6.6 %
NEUTROPHILS # BLD AUTO: 14.3 10E9/L (ref 1.6–8.3)
NEUTROPHILS NFR BLD AUTO: 79 %
NRBC # BLD AUTO: 0 10*3/UL
NRBC BLD AUTO-RTO: 0 /100
PLATELET # BLD AUTO: 135 10E9/L (ref 150–450)
POTASSIUM SERPL-SCNC: 4.4 MMOL/L (ref 3.4–5.3)
RBC # BLD AUTO: 4.33 10E12/L (ref 4.4–5.9)
SODIUM SERPL-SCNC: 141 MMOL/L (ref 133–144)
WBC # BLD AUTO: 18.1 10E9/L (ref 4–11)

## 2019-06-18 PROCEDURE — 36415 COLL VENOUS BLD VENIPUNCTURE: CPT | Performed by: HOSPITALIST

## 2019-06-18 PROCEDURE — 25000128 H RX IP 250 OP 636: Performed by: HOSPITALIST

## 2019-06-18 PROCEDURE — 99232 SBSQ HOSP IP/OBS MODERATE 35: CPT | Performed by: HOSPITALIST

## 2019-06-18 PROCEDURE — 85025 COMPLETE CBC W/AUTO DIFF WBC: CPT | Performed by: HOSPITALIST

## 2019-06-18 PROCEDURE — 12000000 ZZH R&B MED SURG/OB

## 2019-06-18 PROCEDURE — 40000275 ZZH STATISTIC RCP TIME EA 10 MIN

## 2019-06-18 PROCEDURE — 25000132 ZZH RX MED GY IP 250 OP 250 PS 637: Mod: GY | Performed by: HOSPITALIST

## 2019-06-18 PROCEDURE — 83735 ASSAY OF MAGNESIUM: CPT | Performed by: HOSPITALIST

## 2019-06-18 PROCEDURE — 94640 AIRWAY INHALATION TREATMENT: CPT

## 2019-06-18 PROCEDURE — 25000125 ZZHC RX 250: Performed by: HOSPITALIST

## 2019-06-18 PROCEDURE — 00000146 ZZHCL STATISTIC GLUCOSE BY METER IP

## 2019-06-18 PROCEDURE — 25800030 ZZH RX IP 258 OP 636: Performed by: HOSPITALIST

## 2019-06-18 PROCEDURE — 80048 BASIC METABOLIC PNL TOTAL CA: CPT | Performed by: HOSPITALIST

## 2019-06-18 PROCEDURE — 27210429 ZZH NUTRITION PRODUCT INTERMEDIATE LITER

## 2019-06-18 PROCEDURE — 94640 AIRWAY INHALATION TREATMENT: CPT | Mod: 76

## 2019-06-18 PROCEDURE — A9270 NON-COVERED ITEM OR SERVICE: HCPCS | Mod: GY | Performed by: HOSPITALIST

## 2019-06-18 RX ORDER — GUAR GUM
1 PACKET (EA) ORAL DAILY
Status: DISCONTINUED | OUTPATIENT
Start: 2019-06-18 | End: 2019-06-20 | Stop reason: HOSPADM

## 2019-06-18 RX ORDER — LEVOTHYROXINE SODIUM 150 UG/1
150 TABLET ORAL EVERY MORNING
Status: ON HOLD | COMMUNITY
End: 2019-09-14

## 2019-06-18 RX ORDER — CARBAMAZEPINE 100 MG/5ML
150 SUSPENSION ORAL EVERY 6 HOURS
Status: DISCONTINUED | OUTPATIENT
Start: 2019-06-18 | End: 2019-06-20 | Stop reason: HOSPADM

## 2019-06-18 RX ADMIN — ENOXAPARIN SODIUM 40 MG: 40 INJECTION SUBCUTANEOUS at 21:54

## 2019-06-18 RX ADMIN — ALBUTEROL SULFATE 2.5 MG: 2.5 SOLUTION RESPIRATORY (INHALATION) at 07:02

## 2019-06-18 RX ADMIN — Medication 40 MG: at 09:18

## 2019-06-18 RX ADMIN — HYDROCORTISONE 20 MG: 20 TABLET ORAL at 09:19

## 2019-06-18 RX ADMIN — HYDROCORTISONE 10 MG: 10 TABLET ORAL at 17:27

## 2019-06-18 RX ADMIN — PIPERACILLIN SODIUM,TAZOBACTAM SODIUM 4.5 G: 4; .5 INJECTION, POWDER, FOR SOLUTION INTRAVENOUS at 20:08

## 2019-06-18 RX ADMIN — CARBAMAZEPINE 150 MG: 100 SUSPENSION ORAL at 06:30

## 2019-06-18 RX ADMIN — POTASSIUM & SODIUM PHOSPHATES POWDER PACK 280-160-250 MG 1 PACKET: 280-160-250 PACK at 09:19

## 2019-06-18 RX ADMIN — ALBUTEROL SULFATE 2.5 MG: 2.5 SOLUTION RESPIRATORY (INHALATION) at 10:52

## 2019-06-18 RX ADMIN — BRIVARACETAM 100 MG: 10 SOLUTION ORAL at 09:20

## 2019-06-18 RX ADMIN — CARBAMAZEPINE 150 MG: 100 SUSPENSION ORAL at 00:20

## 2019-06-18 RX ADMIN — SODIUM CHLORIDE: 9 INJECTION, SOLUTION INTRAVENOUS at 00:27

## 2019-06-18 RX ADMIN — ACETYLCYSTEINE 2 ML: 200 SOLUTION ORAL; RESPIRATORY (INHALATION) at 10:52

## 2019-06-18 RX ADMIN — ALBUTEROL SULFATE 2.5 MG: 2.5 SOLUTION RESPIRATORY (INHALATION) at 15:09

## 2019-06-18 RX ADMIN — METOCLOPRAMIDE 10 MG: 5 SOLUTION ORAL at 09:19

## 2019-06-18 RX ADMIN — ACETYLCYSTEINE 2 ML: 200 SOLUTION ORAL; RESPIRATORY (INHALATION) at 07:02

## 2019-06-18 RX ADMIN — MICONAZOLE NITRATE: 2 POWDER TOPICAL at 00:19

## 2019-06-18 RX ADMIN — METOCLOPRAMIDE 10 MG: 5 SOLUTION ORAL at 17:27

## 2019-06-18 RX ADMIN — METOCLOPRAMIDE 10 MG: 5 SOLUTION ORAL at 21:55

## 2019-06-18 RX ADMIN — PIPERACILLIN SODIUM,TAZOBACTAM SODIUM 4.5 G: 4; .5 INJECTION, POWDER, FOR SOLUTION INTRAVENOUS at 08:49

## 2019-06-18 RX ADMIN — PIPERACILLIN SODIUM,TAZOBACTAM SODIUM 4.5 G: 4; .5 INJECTION, POWDER, FOR SOLUTION INTRAVENOUS at 02:19

## 2019-06-18 RX ADMIN — ACETYLCYSTEINE 2 ML: 200 SOLUTION ORAL; RESPIRATORY (INHALATION) at 18:55

## 2019-06-18 RX ADMIN — BRIVARACETAM 100 MG: 10 SOLUTION ORAL at 21:54

## 2019-06-18 RX ADMIN — CARBAMAZEPINE 150 MG: 100 SUSPENSION ORAL at 20:08

## 2019-06-18 RX ADMIN — CARBAMAZEPINE 150 MG: 100 SUSPENSION ORAL at 13:37

## 2019-06-18 RX ADMIN — ACETYLCYSTEINE 2 ML: 200 SOLUTION ORAL; RESPIRATORY (INHALATION) at 15:10

## 2019-06-18 RX ADMIN — SODIUM CHLORIDE: 9 INJECTION, SOLUTION INTRAVENOUS at 20:08

## 2019-06-18 RX ADMIN — Medication 1 PACKET: at 09:21

## 2019-06-18 RX ADMIN — POTASSIUM & SODIUM PHOSPHATES POWDER PACK 280-160-250 MG 1 PACKET: 280-160-250 PACK at 21:54

## 2019-06-18 RX ADMIN — SODIUM CHLORIDE: 9 INJECTION, SOLUTION INTRAVENOUS at 09:57

## 2019-06-18 RX ADMIN — PIPERACILLIN SODIUM,TAZOBACTAM SODIUM 4.5 G: 4; .5 INJECTION, POWDER, FOR SOLUTION INTRAVENOUS at 15:05

## 2019-06-18 RX ADMIN — MINERAL SUPPLEMENT IRON 300 MG / 5 ML STRENGTH LIQUID 100 PER BOX UNFLAVORED 220 MG: at 09:18

## 2019-06-18 RX ADMIN — ALBUTEROL SULFATE 2.5 MG: 2.5 SOLUTION RESPIRATORY (INHALATION) at 18:54

## 2019-06-18 RX ADMIN — POTASSIUM & SODIUM PHOSPHATES POWDER PACK 280-160-250 MG 1 PACKET: 280-160-250 PACK at 17:27

## 2019-06-18 ASSESSMENT — ACTIVITIES OF DAILY LIVING (ADL)
ADLS_ACUITY_SCORE: 43
ADLS_ACUITY_SCORE: 45

## 2019-06-18 ASSESSMENT — MIFFLIN-ST. JEOR: SCORE: 1548.25

## 2019-06-18 NOTE — PROGRESS NOTES
Admission    Patient arrives to room 629 via cart from ER  Care plan note: completed    Inpatient nursing criteria listed below were met:    PCD's Documented: NA  Skin issues/needs documented :Yes  Isolation education started/completed Yes  Patient allergies verified with patient: Yes  Verified completion of [place] Risk Assessment Tool:  Yes  Verified completion of Guardianship screening tool: Yes  Fall Prevention: Care plan updated, Education given and documented Yes  Care Plan initiated: Yes  Home medications documented in belongings flowsheet: NA  Patient belongings documented in belongings flowsheet: Yes  Reminder note (belongings/ medications) placed in discharge instructions:Yes  Admission profile/ required documentation complete: NA  Bedside Report Letter given and explained to patient Yes

## 2019-06-18 NOTE — PLAN OF CARE
Date/Time: 6- AM shift  Cognitive Concerns/ Orientation : Alert, makes sounds, able to say yes/no/hello  BEHAVIOR & AGGRESSION TOOL COLOR: Green but can get aggitated with cares. Likes to wipe himself down during inés area cares but pt has nails and scratches himself  CIWA SCORE: n/a   ABNL VS/O2: VSS on RA, denies pain.   MOBILITY: Lift/assist of two/ Up to the chair x 1 this shift.   PAIN MANAGMENT: n/a  DIET: NPO; Tube feeding was started around 10 am, now at goal rate of 100 ml/hr with 60 ml/hr of free water. Nutrition is following  BOWEL/BLADDER: Incontinent, external urinal catheter placed.   ABNL LAB/BG:WBC 18.1. Pl 135. Hg 11.9  DRAIN/DEVICES: PIV infusing with IVF at 100 ml/hr  TELEMETRY RHYTHM: n/a   SKIN: Jessica area and coccyx blanchable redness, abrasion right elbow. WOC RN consulted.   TESTS/PROCEDURES: n/a   D/C DAY/GOALS/PLACE: pending   OTHER IMPORTANT INFO: Contact isolation MRSA/VRE.  Seizure precautions in place.Continues on IV Zosyn

## 2019-06-18 NOTE — ED NOTES
"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  Yes         If yes, is there guardianship documents in the Epic \"Code/ACP\" activity?  Yes         Guardian Notified?  Yes  [place] - Suicide Severity Rating Scale Completed? Yes   If yes, what color did the patient score?  White    Patient Report: Initial Complaint: Fever   Focused Assessment: Pt's family reports that pt \"spiked a fever today and has been having trouble with pneumonia.\" Pt has cough present during triage and original temp of 105. Pt is nonverbal and total care. Pt is tachycardic. Pt has feeding tube placed. Pt is incontinent of urine and stool. Pt's coccyx and groin are red. Pt's current temp is 101.7 after tylenol suppository. Pt has gotten 2 L of LR and working on the last 343 ml of bolus. Pt's chest xray shows new infiltrates.     Tests Performed:   Results for orders placed or " performed during the hospital encounter of 06/17/19   XR Chest 2 Views    Narrative    CHEST TWO VIEWS  6/17/2019 7:23 PM     HISTORY: Fever. Bedridden, nonverbal, vomiting.    COMPARISON: 5/26/2019.      Impression    IMPRESSION: Elevated right diaphragm. Shallow inspiration. Infiltrates  posteriorly in the lungs noted on lateral view, new since previous  exam.      VENICE KENNEDY MD   CBC with platelets differential   Result Value Ref Range    WBC 10.7 4.0 - 11.0 10e9/L    RBC Count 4.48 4.4 - 5.9 10e12/L    Hemoglobin 12.4 (L) 13.3 - 17.7 g/dL    Hematocrit 37.0 (L) 40.0 - 53.0 %    MCV 83 78 - 100 fl    MCH 27.7 26.5 - 33.0 pg    MCHC 33.5 31.5 - 36.5 g/dL    RDW 15.3 (H) 10.0 - 15.0 %    Platelet Count 183 150 - 450 10e9/L    Diff Method Automated Method     % Neutrophils 78.0 %    % Lymphocytes 12.2 %    % Monocytes 7.0 %    % Eosinophils 2.4 %    % Basophils 0.3 %    % Immature Granulocytes 0.1 %    Nucleated RBCs 0 0 /100    Absolute Neutrophil 8.3 1.6 - 8.3 10e9/L    Absolute Lymphocytes 1.3 0.8 - 5.3 10e9/L    Absolute Monocytes 0.7 0.0 - 1.3 10e9/L    Absolute Eosinophils 0.3 0.0 - 0.7 10e9/L    Absolute Basophils 0.0 0.0 - 0.2 10e9/L    Abs Immature Granulocytes 0.0 0 - 0.4 10e9/L    Absolute Nucleated RBC 0.0    Comprehensive metabolic panel   Result Value Ref Range    Sodium 133 133 - 144 mmol/L    Potassium 4.6 3.4 - 5.3 mmol/L    Chloride 98 94 - 109 mmol/L    Carbon Dioxide 28 20 - 32 mmol/L    Anion Gap 7 3 - 14 mmol/L    Glucose 82 70 - 99 mg/dL    Urea Nitrogen 14 7 - 30 mg/dL    Creatinine 0.72 0.66 - 1.25 mg/dL    GFR Estimate >90 >60 mL/min/[1.73_m2]    GFR Estimate If Black >90 >60 mL/min/[1.73_m2]    Calcium 8.5 8.5 - 10.1 mg/dL    Bilirubin Total 0.2 0.2 - 1.3 mg/dL    Albumin 3.2 (L) 3.4 - 5.0 g/dL    Protein Total 7.9 6.8 - 8.8 g/dL    Alkaline Phosphatase 110 40 - 150 U/L    ALT 24 0 - 70 U/L    AST 14 0 - 45 U/L   UA with Microscopic   Result Value Ref Range    Color Urine Yellow      Appearance Urine Clear     Glucose Urine Negative NEG^Negative mg/dL    Bilirubin Urine Negative NEG^Negative    Ketones Urine 5 (A) NEG^Negative mg/dL    Specific Gravity Urine 1.014 1.003 - 1.035    Blood Urine Negative NEG^Negative    pH Urine 8.5 (H) 5.0 - 7.0 pH    Protein Albumin Urine 10 (A) NEG^Negative mg/dL    Urobilinogen mg/dL 4.0 (H) 0.0 - 2.0 mg/dL    Nitrite Urine Negative NEG^Negative    Leukocyte Esterase Urine Moderate (A) NEG^Negative    Source Catheterized Urine     WBC Urine 25 (H) 0 - 5 /HPF    RBC Urine 2 0 - 2 /HPF   ISTAT gases lactate gabe POCT   Result Value Ref Range    Ph Venous 7.41 7.32 - 7.43 pH    PCO2 Venous 37 (L) 40 - 50 mm Hg    PO2 Venous 28 25 - 47 mm Hg    Bicarbonate Venous 24 21 - 28 mmol/L    O2 Sat Venous 54 %    Lactic Acid 2.5 (H) 0.7 - 2.1 mmol/L   Blood culture   Result Value Ref Range    Specimen Description Blood     Culture Micro No growth after 1 hour      Abnormal Results: See above  Treatments provided: See Mar    Family Comments: Mother is aware but not present at hospital. Savannah is mom at #[phone]    OBS brochure/video discussed/provided to patient/family: No              Name of person given brochure if not patient: N/A              Relationship to patient: N/A    ED Medications:   Medications   lactated ringers infusion (has no administration in time range)   piperacillin-tazobactam (ZOSYN) 4.5 g vial to attach to  mL bag (4.5 g Intravenous New Bag 6/17/19 2021)   lactated ringers BOLUS 2,313 mL (2,313 mLs Intravenous New Bag 6/17/19 1836)   acetaminophen (TYLENOL) Suppository 650 mg (650 mg Rectal Given 6/17/19 1841)       Drips infusing?:  Yes    For the majority of the shift this patient was Green.   Interventions performed were N/A.    Severe Sepsis OR Septic Shock Diagnosis Present: No    To be done/followed up on inpatient unit:  Finish LR bolus.     ED NURSE PHONE NUMBER: [phone]

## 2019-06-18 NOTE — PROGRESS NOTES
Nebs given as ordered. LS are diminished t/o and pt is on RA. Will continue to follow.  Red Brown

## 2019-06-18 NOTE — CONSULTS
"CLINICAL NUTRITION SERVICES  -  ASSESSMENT NOTE      Recommendations Ordered by Registered Dietitian (RD):   Resume home TF regimen = Isosource 1.5 (substitution for Jevity) via J-port at 100 mL/hr x 12 hrs (7am-7pm) to provide 1800 kcal (25 kcal/kg), 82 gm protein (1.1 gm/kg), 18 gm fiber, 912 mL free water, 100% DRIs  + Nutrisource Fiber daily (1pkt) = 15 kcal and 3 gm fiber   - Flushes 60 mL q 4 hrs while on IVF     Malnutrition:   % Weight Loss:  Up to 5% in 1 month (non-severe malnutrition) --> ?accuracy  % Intake:  No decreased intake noted  Subcutaneous Fat Loss:  None observed  Muscle Loss:  None observed  Fluid Retention:  None noted    Malnutrition Diagnosis: Patient does not meet two of the above criteria necessary for diagnosing malnutrition        REASON FOR ASSESSMENT  Keyon Farias is a 58 year old male seen by Registered Dietitian for Provider Order - Registered Dietitian to Assess and Order TF per Medical Nutrition Therapy Protocol      NUTRITION HISTORY  - Information obtained from chart review:  - Patient well known to nutrition services from multiple recent admissions  - Per last RD assessment on 5/27/19 =   - He has been on TF via GJ tube since August 2016; last tube was placed 3/8/19 - 18Fr TORY GJ tube.  - Per previous records pt's home TF regimen is as follows - Jevity 1.5 (5 to 5.5 cans daily) x 12 hours during the day to provide 3596-0372 kcal, 77-83g protein, ~260g CHO, ~27g fiber and ~900mL free water. H2O flushes 120 mL QID.  - Pt lives with his mother, she likes to keep the TF rate at no more than 100 ml/hr. He is fed during the day rather than at night so that he can be up in the chair to prevent aspiration\"    - During admission above, patient last received:  Isosource 1.5 at 100 mL/hr x 12 hrs (7am-7pm) via G/J tube     - Of note, patients mother/caregiver is recovering from back surgery (?in rehabilitation)    - H&P = \"It seems despite aspiration precautions including G-tube feeding, " "bed elevation, and no oral intake including no oral medications, he continues to have recurrence of aspiration pneumonitis versus aspiration pneumonia\"      CURRENT NUTRITION ORDERS  Diet Order:     NPO     Current Intake/Tolerance:  Visited with patient today. He is non-verbal.   Patient nodded and smiled when asked if TF was tolerated well at home, and when asked if it was last running yesterday      NUTRITION FOCUSED PHYSICAL ASSESSMENT (NFPA)  Completed:  Yes Visual assessment only         Observed:    Patient is thin but no acute changes observed     Obtained from Chart/Interdisciplinary Team:  None    ANTHROPOMETRICS  Height: 5' 10\" (of note, last admission <1 month ago, pt was measured at 6'0\")  Weight: 156 lbs 15.48 oz (71.2 kg)  Body mass index is 22.52 kg/m .  Weight Status:  Normal BMI  IBW: 75 kg   % IBW: 95%  Weight History: Wt stable over the past 3 months, although appears down ~8# within the past 1 month (5%) --> unable to determine accuracy of weight shifts at this time   Wt Readings from Last 10 Encounters:   06/18/19 71.2 kg (156 lb 15.5 oz)   05/30/19 74.5 kg (164 lb 3.9 oz)   05/08/19 74.5 kg (164 lb 3.2 oz)   04/08/19 71.8 kg (158 lb 6.4 oz)   04/02/19 72 kg (158 lb 11.7 oz)   03/18/19 70.9 kg (156 lb 4.9 oz)   03/11/19 77.6 kg (171 lb)   03/04/19 72.6 kg (160 lb)   02/22/19 74.4 kg (164 lb 0.4 oz)   02/01/19 71.5 kg (157 lb 11.2 oz)       LABS  Labs reviewed  K 4.4 (NL), Mg 2.2 (NL), No Phos --> add-on  Recent Labs   Lab 06/18/19  0839 06/18/19  0818 06/17/19  1830   *  --  82   BGM  --  109*  --        MEDICATIONS  Medications reviewed  NaCl IVF at 100 mL/hr    ASSESSED NUTRITION NEEDS PER APPROVED PRACTICE GUIDELINES:    Dosing Weight 71.2 kg - current  Estimated Energy Needs: 5712-7671 kcals (25-30 Kcal/Kg)  Justification: maintenance  Estimated Protein Needs: 72-85 grams protein (1.2-1.5 g pro/Kg)  Justification: preservation of lean body mass  Estimated Fluid Needs: 0223-1461  mL " (1 mL/Kcal)  Justification: maintenance    MALNUTRITION:  % Weight Loss:  Up to 5% in 1 month (non-severe malnutrition) --> ?accuracy  % Intake:  No decreased intake noted  Subcutaneous Fat Loss:  None observed  Muscle Loss:  None observed  Fluid Retention:  None noted    Malnutrition Diagnosis: Patient does not meet two of the above criteria necessary for diagnosing malnutrition    NUTRITION DIAGNOSIS:  No nutrition diagnosis identified at this time (TF planned to resume today)      NUTRITION INTERVENTIONS  Recommendations / Nutrition Prescription  Resume home TF regimen = Isosource 1.5 (substitution for Jevity) at 100 mL/hr x 12 hrs (7am-7pm) to provide 1800 kcal (25 kcal/kg), 82 gm protein (1.1 gm/kg), 18 gm fiber, 912 mL free water, 100% DRIs  + Nutrisource Fiber daily (1pkt) = 15 kcal and 3 gm fiber   - Flushes 60 mL q 4 hrs while on IVF    Implementation  Nutrition education: Per Provider order if indicated   EN Composition, EN Schedule and Feeding Tube Flush - as above    Nutrition Goals  EN to resume within 24 hrs     MONITORING AND EVALUATION:  Progress towards goals will be monitored and evaluated per protocol and Practice Guidelines        Yanely Jefferson RD, LD  Clinical Dietitian

## 2019-06-18 NOTE — PROGRESS NOTES
RECEIVING UNIT ED HANDOFF REVIEW    ED Nurse Handoff Report was reviewed by: Antonietta Schuler on June 17, 2019 at 9:03 PM

## 2019-06-18 NOTE — H&P
Admitted:     06/17/2019      PRIMARY CARE PHYSICIAN:  Dr. Carlos Gomez MD       CHIEF COMPLAINT:  Fever.      HISTORY OF PRESENT ILLNESS:  History is limited from the patient due to his nonverbal status and history was reviewed from the chart review and from his mother over the phone who herself is recovering from a back surgery and rehabilitation.  The patient is also well known to me from his prior hospitalization, Mr. Farias is a 58-year-old male with a past medical history as listed below, notably for traumatic brain injury with subsequent panhypopituitarism and spastic hemiplegia, seizures who presents with fever and cough consistent with recurrent aspiration pneumonia.  He has been admitted more than 10 time  this year for similar presentation, most recently on 05/30/2019 when he was discharged with a course of Augmentin and was also treated with ciprofloxacin for Pseudomonas UTI.  It seems despite aspiration precautions including G-tube feeding, bed elevation, and no oral intake including no oral medications, he continues to have recurrence of aspiration pneumonitis versus aspiration pneumonia.  He was noted with a temperature of 105 degree Fahrenheit today and was sent to ER, was seen by Dr. Avila.  A chest x-ray was noted with new infiltrates in the posterior lung.  He was given 2 liters of IV fluids.  A rectal Tylenol and Zosyn and hospitalist was requested admission for further evaluation.  The patient himself is unable to voice any concerns, does respond with thumbs up or thumbs down.       REVIEW OF SYSTEMS:  Difficult to review with language barrier due to the patient being nonverbal status.      PAST MEDICAL HISTORY:   1.  Traumatic brain injury resulting in aphasia and spastic hemiplegia.   2.  Recurrent aspiration pneumonia versus pneumonitis.   3.  History of DVT upper extremity.   4.  Gastroesophageal reflux disease.   5.  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   6.  Seizure.    7.  Spastic hemiplegia.   8.  History of septic shock.   9.  Hypothyroidism.   10.  History of necrotizing pancreatitis.      MEDICATIONS PRIOR TO ADMISSION:  Medications Prior to Admission   Medication Sig Dispense Refill Last Dose     acetaminophen (TYLENOL) 500 MG tablet 1,000 mg by Oral or FT or NG tube route every 6 hours as needed for mild pain   Unknown     acetylcysteine (MUCOMYST) 20 % neb solution Take 2 mLs by nebulization 4 times daily (Patient taking differently: Take 2 mLs by nebulization 4 times daily With Albuterol at 07:00, 11:00, 15:00, 19:00) 300 vial 0 Unknown     albuterol (PROVENTIL) (5 MG/ML) 0.5% neb solution Take 2.5 mg by nebulization 4 times daily 0700 1100 1500 1900 with mucomyst   Unknown     bacitracin ointment Apply topically daily as needed for wound care To PEG site.    prn     Brivaracetam (BRIVIACT) 10 MG/ML solution 100 mg by Oral or FT or NG tube route 2 times daily @0900 and 2100   Unknown     calcium carbonate 1250 (500 CA) MG/5ML SUSP suspension 5 mLs (1,250 mg) by Per J Tube route 3 times daily (with meals) 450 mL  Unknown     carBAMazepine (TEGRETOL) 100 MG/5ML suspension Take 150 mg by mouth every 6 hours At 06:00, 12:00, 18:00 and 24:00 for seizures   Unknown     ferrous sulfate 220 (44 Fe) MG/5ML ELIX 220 mg by Per Feeding Tube route daily   Unknown     glycopyrrolate (GLYCATE) 2 MG tablet Take 1-2 mg by mouth 2 times daily as needed (secretions) 1/2 to 1 tablet (1 - 2 mg) up to twice daily if needed for secretions.   prn     hydrocortisone (CORTEF) 5 MG tablet 10 mg by Oral or FT or NG tube route daily (with dinner) At 1500   Unknown     hydrocortisone (CORTEF) 5 MG tablet 20 mg by Oral or FT or NG tube route every morning    Unknown     levothyroxine (SYNTHROID/LEVOTHROID) 150 MCG tablet 150 mcg by Per Feeding Tube route every morning Confirmed dose with Georgina -   unknown at 0500     melatonin (MELATONIN) 1 MG/ML LIQD liquid 6 mg by Per NG tube route At Bedtime     Unknown     metoclopramide (REGLAN) 5 MG/5ML solution 10 mLs (10 mg) by Per J Tube route 3 times daily (Patient taking differently: 10 mg by Per J Tube route 2 times daily ) 240 mL 0 Unknown     Multiple Vitamins-Minerals (CENTRUM SILVER) per tablet Take 1 tablet by mouth daily Crush and feed via j-tube   Unknown     pantoprazole (PROTONIX) 2 mg/mL SUSP suspension 20 mLs (40 mg) by Per J Tube route daily 400 mL 1 Unknown     potassium & sodium phosphates (NEUTRA-PHOS) 280-160-250 MG Packet Take 1 packet by mouth 3 times daily 0900, 1500, 2100.    Unknown     testosterone cypionate (DEPOTESTOTERONE CYPIONATE) 200 MG/ML injection Inject 76 mg into the muscle See Admin Instructions Every 2 weeks on    76 mg or 0.38 mL   Unknown     Vitamin D, Cholecalciferol, 1000 units TABS Take 2,000 Units by mouth every morning Crush and feed via j-tube   Unknown     [] amoxicillin-clavulanate (AUGMENTIN) 875-125 MG tablet Take 1 tablet by mouth every 12 hours for 5 days 10 tablet 0      [] ciprofloxacin (CIPRO) 500 MG tablet Take 1 tablet (500 mg) by mouth every 12 hours for 6 days 12 tablet 0      Guar Gum (FIBER MODULAR, NUTRISOURCE FIBER,) packet 1 packet by Per G Tube route daily 30 packet 0 Unknown at Unknown time     order for DME Equipment being ordered: Nebulizer 1 Units 0         ALLERGIES:  DILANTIN AND VALPROIC ACID.       SOCIAL HISTORY:  His mother is his guardian, has home care.  Quit smoking in , denies alcohol or illicit drug use.      FAMILY HISTORY:  Reviewed and not pertinent to current presentation.      PHYSICAL EXAMINATION:   GENERAL:  The patient is conscious, alert, awake, mostly nonverbal but follows simple commands and interacts minimally with yes, no, of nodding, and he uses thumbs up for affirmative answers.   VITAL SIGNS:  Temperature on presentation was 105 degrees Fahrenheit, heart rate 118, blood pressure 110/61, saturation 95% on room air.   HEENT:  Pupils are equal and  reactive to light and accommodation.  Extraocular movements are intact.  Oral mucosa is dry.   NECK:  Supple, no raised JVD.   RESPIRATORY:  He has mild coarse breath sounds bilaterally, no significant wheezing.   CARDIOVASCULAR:  Normal S1-S2, regular rhythm, tachycardic, no murmur.   ABDOMEN:  Soft, nontender, nondistended.  A feeding tube is in place.  No guarding, rigidity or rebound tenderness.  Bowel sounds are present.   LOWER EXTREMITIES:  With no edema.   NEUROLOGIC:  He has spastic hemiplegia on the right and is mostly nonverbal.   PSYCHIATRY:  Normal mood.      LABORATORY AND IMAGING:  Reviewed in Epic.  CMP mostly unremarkable except for lactate of 2.5, blood glucose 82.  CBC with WBC of 10.7, hemoglobin 12.4, platelet 183,000.  UA with moderate leukocyte esterase, 25 WBC.  Blood and urine cultures are pending.  Chest x-ray reviewed shows posterior infiltrates.      ASSESSMENT AND PLAN:  Mr. Keyon Farias is a 58-year-old male with traumatic brain injury with resultant aphasia and nonverbal status, spastic paraplegia, seizure, panhypopituitarism, GERD, and recurrent aspiration pneumonia, who was brought to the ER with fever.  This will be his twelfth or thirteenth admissions this year with similar presentation.     1.  Recurrent aspiration pneumonia versus pneumonitis with sepsis.    - We will admit him as inpatient.  He has had frequent recurrence despite tube feeding and aspiration precautions.  However, he recovers quickly from febrile event following aspiration potentially suggesting pneumonitis rather than pneumonia, has been evaluated by ID on several occasions.  We will continue with Zosyn initiated in the ER.  Monitor fever curve.  We will have aspiration precautions, n.p.o., including medications, feeding through the G-tube.  Nutrition consult for tube feeding.  Resume PTA Mucomyst and Albuterol nebs.  We will also start him on normal saline at 100 mL per hour as he clinically looks very dry.      2.  Traumatic brain injury with sequelae of aphasia and spastic paralysis.  He gets home care services.  We will consult  for disposition planning.   3.  Seizure disorder.  Resume PTA Tegretol, Briviact;  We will have seizure precautions.   4.  Panhypopituitarism.  Resume PTA hydrocortisone 20 mg in morning and 10 mg at night, does look septic, but hemodynamically stable.  We will order 1 dose of Solu-Medrol 100 mg IV.   5.  Hypothyroidism.  Continue PTA Synthroid.   6.  Gonadotropin, hypogonadism.  We will resume PTA testosterone injection in the outpatient setting.   7.  Deep venous thrombosis prophylaxis with Lovenox.   8.  History of enlarging pancreatic tail cyst.  He has history of necrotizing pancreatitis requiring cystogastrostomy with a stent placement and subsequent removal.  During one of his recent hospitalization, a CT was noted with enlarging cyst.  This was evaluated by Dr. Davis from GI during last hospitalization, was suggested a followup CT scan in 6 months.   9.  Code status.  This has been discussed on multiple occasions with his mom and he will be:  FULL CODE.         ABDON AUSTIN MD             D: 2019   T: 2019   MT: GIRMA      Name:     BERT BARAJAS   MRN:      [id number]        Account:      [id number]   :      1960        Admitted:     2019                   Document: [id number]       cc: Carlos Gomez MD

## 2019-06-18 NOTE — PROGRESS NOTES
[facility]  Hospitalist Progress Note        Helio Galan MD   06/18/2019        Interval History:      - no acute issues overnight apart from mild hypotension; fever trended down; looks better         Assessment and Plan:        Mr. Keyon Farias is a 58-year-old male with traumatic brain injury with resultant aphasia and nonverbal status, spastic paraplegia, seizure, panhypopituitarism, GERD, and recurrent aspiration pneumonia, who was brought to the ER with fever.  This will be his 12th or 13th admissions this year with similar presentation.     # Recurrent aspiration pneumonia versus pneumonitis with sepsis  # Enterococcus uremia  - He has had frequent recurrence despite tube feeding and aspiration precautions.  However, he recovers quickly from febrile event following aspiration potential suggesting pneumonitis rather than pneumonia, has been evaluated by ID on several occasions; palliative care discussions has been thought about before but family resistant.  - fever trended down but leukocytosis worsening (likely sec to steroids, got one dose of IV hydrocortisone and on chronic steroids)  - blood culture from 6/17 with no growth so far but urine cultures from 6/17 growing Enterococcus, susceptibilities pending  - continue Zosyn; Monitor fever curve.  We will have aspiration precautions, n.p.o., including medications, feeding through the G-tube  -  Nutrition following for tube feeding.    - continue PTA Mucomyst and Albuterol nebs  - clinically looked dehydrated on admission and BP soft; will continue NS at 100 ml/hr    2.  Traumatic brain injury with sequelae of aphasia and spastic paralysis.  He gets home care services.    -  for disposition planning     3.  Seizure disorder.    - continue PTA Tegretol and Brivaracetam; seizure precautions.     4.  Panhypopituitarism.    - continue PTA hydrocortisone 20 mg in morning and 10 mg at night, hemodynamically stable; got 1 dose of  "Solu-Medrol 100 mg IV on admission     5.  Hypothyroidism.  Continue PTA Synthroid when verified      6.  Gonadotropin, hypogonadism.  We will resume PTA testosterone injection in the outpatient setting.     7.  Deep venous thrombosis prophylaxis with Lovenox.     8.  History of enlarging pancreatic tail cyst.  He has history of necrotizing pancreatitis requiring cystogastrostomy with a stent placement and subsequent removal.  During one of his recent hospitalization, a CT was noted with enlarging cyst.  This was evaluated by Dr. Davis from GI during last hospitalization, was suggested a followup CT scan in 6 months.     9.  Code status.  This has been discussed on multiple occasions with his mom and he will be:  FULL CODE.    Disposition: likely 2-3 days pending clinical stability, resolution of sepsis                     Physical Exam:      Blood pressure 114/59, pulse 117, temperature 98.1  F (36.7  C), temperature source Axillary, resp. rate 16, height 1.778 m (5' 10\"), weight 71.2 kg (156 lb 15.5 oz), SpO2 97 %.  Vitals:    06/17/19 1819 06/17/19 2139 06/18/19 0654   Weight: 77.1 kg (170 lb) 73.1 kg (161 lb 2.5 oz) 71.2 kg (156 lb 15.5 oz)     Vital Signs with Ranges  Temp:  [98.1  F (36.7  C)-105  F (40.6  C)] 98.1  F (36.7  C)  Pulse:  [112-121] 117  Heart Rate:  [] 87  Resp:  [16-24] 16  BP: ()/(47-62) 114/59  SpO2:  [94 %-98 %] 97 %  I/O's Last 24 hours  I/O last 3 completed shifts:  In: 3283 [I.V.:660; NG/GT:210; IV Piggyback:2413]  Out: -     Constitutional: Alert, awake mostly nonverbal but follows simple commands and interacts minimally with yes, no, of nodding, and he uses thumbs up for affirmative answers. ; comfortable in no apparent distress   HEENT: Pupils equal and reactive to light and accomodation, EOMI intact; neck supple no raised JVD or rigidity    Oral cavity: Dry mucosa   Cardiovascular: Normal s1 s2, regular rate and rhythm, no murmur   Lungs: B/l clear to auscultation, no " wheezes or crepitations   Abdomen: Soft, nontender, nondistended.  A feeding tube is in place.  No guarding, rigidity or rebound tenderness.  Bowel sounds are present   LE : No edema   Musculoskeletal/ neuro: He has spastic hemiplegia on the right and is mostly nonverbal           Psychiatry: normal mood and affect                Medications:          acetylcysteine  2 mL Nebulization 4x Daily     albuterol  2.5 mg Nebulization 4x Daily     Brivaracetam  100 mg Per Feeding Tube BID     carBAMazepine  150 mg Per Feeding Tube Q6H     enoxaparin  40 mg Subcutaneous Q24H     ferrous sulfate  220 mg Per Feeding Tube Daily     fiber modular (NUTRISOURCE FIBER)  1 packet Per Feeding Tube Daily     hydrocortisone  10 mg Per Feeding Tube Daily with supper     hydrocortisone  20 mg Per Feeding Tube QAM     metoclopramide  10 mg Per J Tube TID     pantoprazole  40 mg Per J Tube Daily     piperacillin-tazobactam  4.5 g Intravenous Q6H     potassium & sodium phosphates  1 packet Per Feeding Tube TID     PRN Meds: acetaminophen, acetaminophen, bacitracin, bisacodyl, IV fluid REPLACEMENT ONLY, glycopyrrolate, magnesium hydroxide, melatonin, miconazole, naloxone, ondansetron **OR** ondansetron, prochlorperazine **OR** prochlorperazine **OR** prochlorperazine, senna-docusate **OR** senna-docusate         Data:      All new lab and imaging data was reviewed.   Recent Labs   Lab Test 06/18/19  0839 06/17/19  1830 05/28/19  0727  02/07/17  0452  01/30/17  0340  01/25/17  0426   WBC 18.1* 10.7 10.0   < > 11.7*   < > 10.7   < > 7.2   HGB 11.9* 12.4* 12.1*   < > 9.4*   < > 7.6*   < > 7.8*   MCV 84 83 86   < > 82   < > 81   < > 81   * 183 189   < > 363   < > 163   < > 162   INR  --   --   --   --  1.27*  --  1.32*  --  1.49*    < > = values in this interval not displayed.      Recent Labs   Lab Test 06/18/19  0839 06/17/19  1830 05/27/19  0853    133 137   POTASSIUM 4.4 4.6 4.5   CHLORIDE 112* 98 107   CO2 22 28 26   BUN 11 14  10   CR 0.85 0.72 0.81   ANIONGAP 7 7 4   STEVE 8.3* 8.5 8.8   * 82 128*     Recent Labs   Lab Test 01/22/17  0500 01/21/17  2348 01/21/17  1600   TROPI 0.017 0.025 0.028

## 2019-06-18 NOTE — PHARMACY-ADMISSION MEDICATION HISTORY
Admission medication history interview status for the 6/17/2019  admission is complete. See EPIC admission navigator for prior to admission medications     Medication history source reliability:Good    Actions taken by pharmacist (provider contacted, etc): Spoke to patient's mother, [facility] RN & reviewed notes in Epic including recent discharge in past month.     Additional medication history information not noted on PTA med list :  -metoclopramide new Rx at discharge 5/30 - 6/14 office visit change from TID to BID.  -Need to clarify levothyroxine dose - appears previously on 137mcg but 150mcg last filled 12/2018 (90 DS) & 3/2019 (67 DS) - Home Care RN to follow up 6/18 to clarify dosage.  -Medications listed reported to be current medications, neither mother nor Home Care RN could confirm last dose times of medications.    Medication reconciliation/reorder completed by provider prior to medication history? No    Time spent in this activity: 30 min    Prior to Admission medications    Medication Sig Last Dose Taking? Auth Provider   acetaminophen (TYLENOL) 500 MG tablet 1,000 mg by Oral or FT or NG tube route every 6 hours as needed for mild pain Unknown Yes Unknown, Entered By History   acetylcysteine (MUCOMYST) 20 % neb solution Take 2 mLs by nebulization 4 times daily  Patient taking differently: Take 2 mLs by nebulization 4 times daily With Albuterol at 07:00, 11:00, 15:00, 19:00 Unknown Yes Starr Champion MD   albuterol (PROVENTIL) (5 MG/ML) 0.5% neb solution Take 2.5 mg by nebulization 4 times daily 0700 1100 1500 1900 with mucomyst Unknown Yes Unknown, Entered By History   bacitracin ointment Apply topically daily as needed for wound care To PEG site.  prn Yes Unknown, Entered By History   Brivaracetam (BRIVIACT) 10 MG/ML solution 100 mg by Oral or FT or NG tube route 2 times daily @0900 and 2100 Unknown Yes Reported, Patient   calcium carbonate 1250 (500 CA) MG/5ML SUSP suspension 5 mLs (1,250 mg)  by Per J Tube route 3 times daily (with meals) Unknown Yes Mariana Venegas MD   carBAMazepine (TEGRETOL) 100 MG/5ML suspension Take 150 mg by mouth every 6 hours At 06:00, 12:00, 18:00 and 24:00 for seizures Unknown Yes Unknown, Entered By History   ferrous sulfate 220 (44 Fe) MG/5ML ELIX 220 mg by Per Feeding Tube route daily Unknown Yes Unknown, Entered By History   glycopyrrolate (GLYCATE) 2 MG tablet Take 1-2 mg by mouth 2 times daily as needed (secretions) 1/2 to 1 tablet (1 - 2 mg) up to twice daily if needed for secretions. prn Yes Unknown, Entered By History   hydrocortisone (CORTEF) 5 MG tablet 10 mg by Oral or FT or NG tube route daily (with dinner) At 1500 Unknown Yes Unknown, Entered By History   hydrocortisone (CORTEF) 5 MG tablet 20 mg by Oral or FT or NG tube route every morning  Unknown Yes Unknown, Entered By History   levothyroxine (SYNTHROID/LEVOTHROID) 137 MCG tablet 137 mcg by Oral or FT or NG tube route daily @ 05:00 Unknown Yes Unknown, Entered By History   melatonin (MELATONIN) 1 MG/ML LIQD liquid 6 mg by Per NG tube route At Bedtime  Unknown Yes Unknown, Entered By History   metoclopramide (REGLAN) 5 MG/5ML solution 10 mLs (10 mg) by Per J Tube route 3 times daily  Patient taking differently: 10 mg by Per J Tube route 2 times daily  Unknown Yes Starr Champion MD   Multiple Vitamins-Minerals (CENTRUM SILVER) per tablet Take 1 tablet by mouth daily Crush and feed via j-tube Unknown Yes Unknown, Entered By History   pantoprazole (PROTONIX) 2 mg/mL SUSP suspension 20 mLs (40 mg) by Per J Tube route daily Unknown Yes Washington Connors MD   potassium & sodium phosphates (NEUTRA-PHOS) 280-160-250 MG Packet Take 1 packet by mouth 3 times daily 0900, 1500, 2100.  Unknown Yes Unknown, Entered By History   testosterone cypionate (DEPOTESTOTERONE CYPIONATE) 200 MG/ML injection Inject 76 mg into the muscle See Admin Instructions Every 2 weeks on Fridays   76 mg or 0.38 mL Unknown Yes  Unknown, Entered By History   Vitamin D, Cholecalciferol, 1000 units TABS Take 2,000 Units by mouth every morning Crush and feed via j-tube Unknown Yes Unknown, Entered By History   Guar Gum (FIBER MODULAR, NUTRISOURCE FIBER,) packet 1 packet by Per G Tube route daily   Starr Champion MD   order for DME Equipment being ordered: Nebulizer   Starr Champion MD

## 2019-06-18 NOTE — PLAN OF CARE
Cognitive Concerns/ Orientation : Alert, non verbal, total cares   BEHAVIOR & AGGRESSION TOOL COLOR: Green   CIWA SCORE: n/a   ABNL VS/O2: Blood pressure 81/50 and 97/57 and temp 99.8 and 98.4  MOBILITY: Lift/assist of two   PAIN MANAGMENT: No s/s pain   DIET: NPO has tube feeding/has nutrition consult ordered for today   BOWEL/BLADDER: Incontinent   ABNL LAB/BG: lactic acid 2.0   DRAIN/DEVICES: GT clamped/ Peripheral IV upper left arm infusing fluids   TELEMETRY RHYTHM: n/a   SKIN: Jessica area and coccyx blanchable redness, abrasion right elbow  TESTS/PROCEDURES: n/a   D/C DAY/GOALS/PLACE: pending   OTHER IMPORTANT INFO: Contact isolation MRSA/VRE.  Seizure precautions in place.

## 2019-06-19 LAB
BACTERIA SPEC CULT: ABNORMAL
ERYTHROCYTE [DISTWIDTH] IN BLOOD BY AUTOMATED COUNT: 16.1 % (ref 10–15)
GLUCOSE SERPL-MCNC: 82 MG/DL (ref 70–99)
HCT VFR BLD AUTO: 38.4 % (ref 40–53)
HGB BLD-MCNC: 12.4 G/DL (ref 13.3–17.7)
Lab: ABNORMAL
MCH RBC QN AUTO: 27.7 PG (ref 26.5–33)
MCHC RBC AUTO-ENTMCNC: 32.3 G/DL (ref 31.5–36.5)
MCV RBC AUTO: 86 FL (ref 78–100)
PLATELET # BLD AUTO: 154 10E9/L (ref 150–450)
RBC # BLD AUTO: 4.48 10E12/L (ref 4.4–5.9)
SPECIMEN SOURCE: ABNORMAL
WBC # BLD AUTO: 8.7 10E9/L (ref 4–11)

## 2019-06-19 PROCEDURE — 99232 SBSQ HOSP IP/OBS MODERATE 35: CPT | Performed by: HOSPITALIST

## 2019-06-19 PROCEDURE — 25000128 H RX IP 250 OP 636: Performed by: HOSPITALIST

## 2019-06-19 PROCEDURE — 40000275 ZZH STATISTIC RCP TIME EA 10 MIN

## 2019-06-19 PROCEDURE — A9270 NON-COVERED ITEM OR SERVICE: HCPCS | Mod: GY | Performed by: HOSPITALIST

## 2019-06-19 PROCEDURE — 12000000 ZZH R&B MED SURG/OB

## 2019-06-19 PROCEDURE — 27210429 ZZH NUTRITION PRODUCT INTERMEDIATE LITER

## 2019-06-19 PROCEDURE — 94640 AIRWAY INHALATION TREATMENT: CPT | Mod: 76

## 2019-06-19 PROCEDURE — 25000125 ZZHC RX 250: Performed by: HOSPITALIST

## 2019-06-19 PROCEDURE — 36415 COLL VENOUS BLD VENIPUNCTURE: CPT | Performed by: HOSPITALIST

## 2019-06-19 PROCEDURE — 25800030 ZZH RX IP 258 OP 636: Performed by: HOSPITALIST

## 2019-06-19 PROCEDURE — 82947 ASSAY GLUCOSE BLOOD QUANT: CPT | Performed by: HOSPITALIST

## 2019-06-19 PROCEDURE — 25000132 ZZH RX MED GY IP 250 OP 250 PS 637: Mod: GY | Performed by: HOSPITALIST

## 2019-06-19 PROCEDURE — 85027 COMPLETE CBC AUTOMATED: CPT | Performed by: HOSPITALIST

## 2019-06-19 PROCEDURE — 94640 AIRWAY INHALATION TREATMENT: CPT

## 2019-06-19 RX ORDER — LEVOTHYROXINE SODIUM 150 UG/1
150 TABLET ORAL EVERY MORNING
Status: DISCONTINUED | OUTPATIENT
Start: 2019-06-19 | End: 2019-06-20 | Stop reason: HOSPADM

## 2019-06-19 RX ORDER — SODIUM CHLORIDE 9 MG/ML
INJECTION, SOLUTION INTRAVENOUS CONTINUOUS
Status: DISCONTINUED | OUTPATIENT
Start: 2019-06-19 | End: 2019-06-20

## 2019-06-19 RX ADMIN — ENOXAPARIN SODIUM 40 MG: 40 INJECTION SUBCUTANEOUS at 22:02

## 2019-06-19 RX ADMIN — CARBAMAZEPINE 150 MG: 100 SUSPENSION ORAL at 00:39

## 2019-06-19 RX ADMIN — POTASSIUM & SODIUM PHOSPHATES POWDER PACK 280-160-250 MG 1 PACKET: 280-160-250 PACK at 17:04

## 2019-06-19 RX ADMIN — POTASSIUM & SODIUM PHOSPHATES POWDER PACK 280-160-250 MG 1 PACKET: 280-160-250 PACK at 08:01

## 2019-06-19 RX ADMIN — Medication 1 PACKET: at 08:02

## 2019-06-19 RX ADMIN — ALBUTEROL SULFATE 2.5 MG: 2.5 SOLUTION RESPIRATORY (INHALATION) at 15:34

## 2019-06-19 RX ADMIN — METOCLOPRAMIDE 10 MG: 5 SOLUTION ORAL at 17:04

## 2019-06-19 RX ADMIN — ACETYLCYSTEINE 2 ML: 200 SOLUTION ORAL; RESPIRATORY (INHALATION) at 19:07

## 2019-06-19 RX ADMIN — PIPERACILLIN SODIUM,TAZOBACTAM SODIUM 4.5 G: 4; .5 INJECTION, POWDER, FOR SOLUTION INTRAVENOUS at 14:08

## 2019-06-19 RX ADMIN — ALBUTEROL SULFATE 2.5 MG: 2.5 SOLUTION RESPIRATORY (INHALATION) at 19:07

## 2019-06-19 RX ADMIN — CARBAMAZEPINE 150 MG: 100 SUSPENSION ORAL at 17:04

## 2019-06-19 RX ADMIN — CARBAMAZEPINE 150 MG: 100 SUSPENSION ORAL at 23:17

## 2019-06-19 RX ADMIN — BRIVARACETAM 100 MG: 10 SOLUTION ORAL at 22:02

## 2019-06-19 RX ADMIN — HYDROCORTISONE 20 MG: 20 TABLET ORAL at 08:00

## 2019-06-19 RX ADMIN — PIPERACILLIN SODIUM,TAZOBACTAM SODIUM 4.5 G: 4; .5 INJECTION, POWDER, FOR SOLUTION INTRAVENOUS at 07:58

## 2019-06-19 RX ADMIN — ALBUTEROL SULFATE 2.5 MG: 2.5 SOLUTION RESPIRATORY (INHALATION) at 11:22

## 2019-06-19 RX ADMIN — ACETYLCYSTEINE: 200 SOLUTION ORAL; RESPIRATORY (INHALATION) at 15:34

## 2019-06-19 RX ADMIN — PIPERACILLIN SODIUM,TAZOBACTAM SODIUM 4.5 G: 4; .5 INJECTION, POWDER, FOR SOLUTION INTRAVENOUS at 02:45

## 2019-06-19 RX ADMIN — Medication 40 MG: at 09:12

## 2019-06-19 RX ADMIN — SODIUM CHLORIDE: 9 INJECTION, SOLUTION INTRAVENOUS at 05:57

## 2019-06-19 RX ADMIN — CARBAMAZEPINE 150 MG: 100 SUSPENSION ORAL at 06:57

## 2019-06-19 RX ADMIN — ACETYLCYSTEINE: 200 SOLUTION ORAL; RESPIRATORY (INHALATION) at 11:23

## 2019-06-19 RX ADMIN — SODIUM CHLORIDE: 9 INJECTION, SOLUTION INTRAVENOUS at 18:33

## 2019-06-19 RX ADMIN — ACETYLCYSTEINE: 200 SOLUTION ORAL; RESPIRATORY (INHALATION) at 08:29

## 2019-06-19 RX ADMIN — POTASSIUM & SODIUM PHOSPHATES POWDER PACK 280-160-250 MG 1 PACKET: 280-160-250 PACK at 22:15

## 2019-06-19 RX ADMIN — ALBUTEROL SULFATE 2.5 MG: 2.5 SOLUTION RESPIRATORY (INHALATION) at 08:29

## 2019-06-19 RX ADMIN — METOCLOPRAMIDE 10 MG: 5 SOLUTION ORAL at 08:00

## 2019-06-19 RX ADMIN — BRIVARACETAM 100 MG: 10 SOLUTION ORAL at 09:12

## 2019-06-19 RX ADMIN — LEVOTHYROXINE SODIUM 150 MCG: 150 TABLET ORAL at 14:07

## 2019-06-19 RX ADMIN — MINERAL SUPPLEMENT IRON 300 MG / 5 ML STRENGTH LIQUID 100 PER BOX UNFLAVORED 220 MG: at 08:01

## 2019-06-19 RX ADMIN — METOCLOPRAMIDE 10 MG: 5 SOLUTION ORAL at 22:02

## 2019-06-19 RX ADMIN — PIPERACILLIN SODIUM,TAZOBACTAM SODIUM 4.5 G: 4; .5 INJECTION, POWDER, FOR SOLUTION INTRAVENOUS at 22:01

## 2019-06-19 RX ADMIN — HYDROCORTISONE 10 MG: 10 TABLET ORAL at 17:14

## 2019-06-19 ASSESSMENT — ACTIVITIES OF DAILY LIVING (ADL)
ADLS_ACUITY_SCORE: 43
ADLS_ACUITY_SCORE: 41
ADLS_ACUITY_SCORE: 43
ADLS_ACUITY_SCORE: 41
ADLS_ACUITY_SCORE: 42
ADLS_ACUITY_SCORE: 43

## 2019-06-19 ASSESSMENT — MIFFLIN-ST. JEOR: SCORE: 1560.64

## 2019-06-19 NOTE — PROGRESS NOTES
[facility]  Hospitalist Progress Note        Helio Galan MD   06/19/2019        Interval History:      - no acute issues overnight; was very happy to hear he could go home soon         Assessment and Plan:        Mr. Keyon Farias is a 58-year-old male with traumatic brain injury with resultant aphasia and nonverbal status, spastic paraplegia, seizure, panhypopituitarism, GERD, and recurrent aspiration pneumonia, who was brought to the ER with fever.  This will be his 12th or 13th admissions this year with similar presentation.     # Recurrent aspiration pneumonia versus pneumonitis with sepsis  # Enterococcus fecalis uremia  - He has had frequent recurrence despite tube feeding and aspiration precautions.  However, he recovers quickly from febrile event following aspiration potential suggesting pneumonitis rather than pneumonia, has been evaluated by ID on several occasions; palliative care discussions has been thought about before but family resistant.  - fever and leukocytosis (transient elevation of wbc to 18 likely sec to steroids, got one dose of IV hydrocortisone and on chronic steroids)  - blood culture from 6/17 with no growth so far but urine cultures from 6/17 growing Enterococcus fecalis sensitive to penicillin--- not sure if true infection, getting better with zosyn for pneumonia; d/w Dr Dillardaid from ID--- to continue current zosyn and can switch to PO Augmentin on discharge  - continue Zosyn; Monitor fever curve.  continue aspiration precautions, n.p.o., including medications, feeding through the G-tube  -  Nutrition following for tube feeding.    - continue PTA Mucomyst and Albuterol nebs  - decrease NS to 75 ml/hr, can discontinue in am    2.  Traumatic brain injury with sequelae of aphasia and spastic paralysis.  He gets home care services.    -  for disposition planning     3.  Seizure disorder.    - continue PTA Tegretol and Brivaracetam; seizure precautions.  "    4.  Panhypopituitarism.    - continue PTA hydrocortisone 20 mg in morning and 10 mg at night, hemodynamically stable; got 1 dose of Solu-Medrol 100 mg IV on admission     5.  Hypothyroidism.  Continue PTA Synthroid     6.  Gonadotropin, hypogonadism.  We will resume PTA testosterone injection in the outpatient setting.     7.  Deep venous thrombosis prophylaxis with Lovenox.     8.  History of enlarging pancreatic tail cyst.  He has history of necrotizing pancreatitis requiring cystogastrostomy with a stent placement and subsequent removal.  During one of his recent hospitalization, a CT was noted with enlarging cyst.  This was evaluated by Dr. Davis from GI during last hospitalization, was suggested a followup CT scan in 6 months.     9.  Code status.  This has been discussed on multiple occasions with his mom and he will be:  FULL CODE.    Disposition: likely in am if remains clinically stable                     Physical Exam:      Blood pressure 129/63, pulse 117, temperature 98.8  F (37.1  C), temperature source Oral, resp. rate 18, height 1.778 m (5' 10\"), weight 72.4 kg (159 lb 11.2 oz), SpO2 96 %.  Vitals:    06/17/19 2139 06/18/19 0654 06/19/19 0615   Weight: 73.1 kg (161 lb 2.5 oz) 71.2 kg (156 lb 15.5 oz) 72.4 kg (159 lb 11.2 oz)     Vital Signs with Ranges  Temp:  [97.3  F (36.3  C)-98.8  F (37.1  C)] 98.8  F (37.1  C)  Heart Rate:  [79-91] 87  Resp:  [16-18] 18  BP: (108-129)/(56-63) 129/63  SpO2:  [96 %-99 %] 96 %  I/O's Last 24 hours  I/O last 3 completed shifts:  In: 3799 [I.V.:2623; NG/GT:1176]  Out: 2125 [Urine:2125]    Constitutional: Alert, awake mostly nonverbal but follows simple commands and interacts minimally with yes, no, of nodding, and he uses thumbs up for affirmative answers. ; comfortable in no apparent distress; very cheerful today   HEENT: Pupils equal and reactive to light and accomodation, EOMI intact; neck supple no raised JVD or rigidity    Oral cavity: Dry mucosa "   Cardiovascular: Normal s1 s2, regular rate and rhythm, no murmur   Lungs: B/l clear to auscultation, no wheezes or crepitations   Abdomen: Soft, nontender, nondistended.  A feeding tube is in place.  No guarding, rigidity or rebound tenderness.  Bowel sounds are present   LE : No edema   Musculoskeletal/ neuro: He has spastic hemiplegia on the right and is mostly nonverbal           Psychiatry: normal mood and affect                Medications:          acetylcysteine  2 mL Nebulization 4x Daily     albuterol  2.5 mg Nebulization 4x Daily     Brivaracetam  100 mg Per Feeding Tube BID     carBAMazepine  150 mg Per Feeding Tube Q6H     enoxaparin  40 mg Subcutaneous Q24H     ferrous sulfate  220 mg Per Feeding Tube Daily     fiber modular (NUTRISOURCE FIBER)  1 packet Per Feeding Tube Daily     hydrocortisone  10 mg Per Feeding Tube Daily with supper     hydrocortisone  20 mg Per Feeding Tube QAM     levothyroxine  150 mcg Per Feeding Tube QAM     metoclopramide  10 mg Per J Tube TID     pantoprazole  40 mg Per J Tube Daily     piperacillin-tazobactam  4.5 g Intravenous Q6H     potassium & sodium phosphates  1 packet Per Feeding Tube TID     PRN Meds: acetaminophen, acetaminophen, bacitracin, bisacodyl, IV fluid REPLACEMENT ONLY, glycopyrrolate, magnesium hydroxide, melatonin, miconazole, naloxone, ondansetron **OR** ondansetron, prochlorperazine **OR** prochlorperazine **OR** prochlorperazine, senna-docusate **OR** senna-docusate         Data:      All new lab and imaging data was reviewed.   Recent Labs   Lab Test 06/18/19  0839 06/17/19  1830 05/28/19  0727  02/07/17  0452  01/30/17  0340  01/25/17  0426   WBC 18.1* 10.7 10.0   < > 11.7*   < > 10.7   < > 7.2   HGB 11.9* 12.4* 12.1*   < > 9.4*   < > 7.6*   < > 7.8*   MCV 84 83 86   < > 82   < > 81   < > 81   * 183 189   < > 363   < > 163   < > 162   INR  --   --   --   --  1.27*  --  1.32*  --  1.49*    < > = values in this interval not displayed.       Recent Labs   Lab Test 06/18/19  0839 06/17/19  1830 05/27/19  0853    133 137   POTASSIUM 4.4 4.6 4.5   CHLORIDE 112* 98 107   CO2 22 28 26   BUN 11 14 10   CR 0.85 0.72 0.81   ANIONGAP 7 7 4   STEVE 8.3* 8.5 8.8   * 82 128*     Recent Labs   Lab Test 01/22/17  0500 01/21/17  2348 01/21/17  1600   TROPI 0.017 0.025 0.028

## 2019-06-19 NOTE — PLAN OF CARE
Cognitive Concerns/ Orientation : HECTOR, calm   BEHAVIOR & AGGRESSION TOOL COLOR: green to yellow, combative with cares.   CIWA SCORE: na  ABNL VS/O2: VSS, on RA  MOBILITY: up with lift, Turn Q 2 hours with Ax2.   PAIN MANAGMENT: FLACC score of 0  DIET: Strict NPO, on TF 7 am to 7pm  BOWEL/BLADDER: INC of B&B, loose stool x1. Will not keep condom cath in place.  Incontinent, excoriated bottom, barrier cream with cares.  Miconazole powder to groin redness.  ABNL LAB/BG: na   DRAIN/DEVICES: G/J, PIV on left arm, infusing NS @ 100.  Intermittent Zosyn.   TELEMETRY RHYTHM: na   SKIN: Very red/irritated perineal area, antifungal power applied. Wound care consult in place, will see tomorrow.   TESTS/PROCEDURES: na   D/C DAY/GOALS/PLACE: pending   OTHER IMPORTANT INFO:

## 2019-06-19 NOTE — PLAN OF CARE
DATE & TIME: 6/18/19 Nights                      Cognitive Concerns/ Orientation : Alert, unable to assess orientation.   BEHAVIOR & AGGRESSION TOOL COLOR: Mostly cooperative, can be combative during cares & Green  CIWA SCORE: NA    ABNL VS/O2: VSS on RA  MOBILITY: T&R, lift.  PAIN MANAGMENT: Denied/ no signs of pain  DIET: NPO with TF during day.  BOWEL/BLADDER: Incontinent of B&B- 2 loose stools during shift. Condom catheter discontinued d/t patient constantly pulling it off.  ABNL LAB/BG: NA  DRAIN/DEVICES: G/J tube, PIV NS infusing at 100.  TELEMETRY RHYTHM: NA  SKIN: Blanchable erythema to buttocks and groin, antifungal powder applied.  TESTS/PROCEDURES: NA  D/C DAY/GOALS/PLACE: Pending medical improvement.  OTHER IMPORTANT INFO: Contact precautions maintained. On IV Abx.

## 2019-06-19 NOTE — PROGRESS NOTES
[facility] Nurse Inpatient Wound Assessment   Reason for consultation: Evaluate and treat coccyx/buttock wounds    Assessment  Coccyx/buttock skin breakdown due to incontinence/moisture, no obvious pressure injury but pt is at risk and has had shallow PIs in the past.   Pt very familiar to [facility] service, often is admitted with redness/rash to this area.  Pt with frequent urinary incontinence, often pulls off external catheters.      Treatment Plan  Coccyx/buttocks and groin: BID and prn with incontinence:   1.  Cleanse gently with barrier wipes.    2.  Dust skin folds and red/raw areas with antifungal powder, rub in well, brush off all excess.    3.  Apply thin glaze of Critic-aid barrier paste to coccyx/buttock/perianal areas (not to groin folds).    4.  Ensure briefs are placed properly, all the way up into groin folds.  Check and change briefs frequently.   5.  PIP measures.  Reposition pt every 1-2 hrs, side to side only.  HOB as low as tolerated.  Consider Pulsate mattress if extended stay expected.      Orders Written  WO Nurse follow-up plan:weekly  Nursing to notify the Provider(s) and re-consult the WO Nurse if wound(s) deteriorates or new skin concern.    Patient History  According to provider note(s):  Mr. Keyon Farias is a 58-year-old male with traumatic brain injury with resultant aphasia and nonverbal status, spastic paraplegia, seizure, panhypopituitarism, GERD, and recurrent aspiration pneumonia, who was brought to the ER with fever.  This will be his 12th or 13th admissions this year with similar presentation.     Objective Data  Containment of urine/stool: Diaper and Incontinence Protocol    Active Diet Order  None      Output:   I/O last 3 completed shifts:  In: 3799 [I.V.:2623; NG/GT:1176]  Out: 2125 [Urine:2125]    Risk Assessment:   Sensory Perception: 3-->slightly limited  Moisture: 3-->occasionally moist  Activity: 2-->chairfast  Mobility: 3-->slightly limited  Nutrition: 3-->adequate  Friction and  Shear: 2-->potential problem  Ricci Score: 16                          Labs:   Recent Labs   Lab 06/19/19  0851  06/17/19  1830   ALBUMIN  --   --  3.2*   HGB 12.4*   < > 12.4*   WBC 8.7   < > 10.7    < > = values in this interval not displayed.       Physical Exam  Skin inspection: focused buttocks/perineal area    Wound Location:  Coccyx/buttocks  Date of last photo 6-19-19      Wound History: present on admission.  Pt with frequent urinary incontinence.  Pt pulls off catheters.  Needs help and encouragement to reposition due to cognitive issues.      Skin is diffusely red and slightly raw around inner buttocks, coccyx, and perianal area.  No defined ulcerations.  Tender to the touch.  Groin folds with mild redness/rash.      Interventions  Current support surface: Standard  Atmos Air mattress  Current off-loading measures: Pillows under calves  Visual inspection of wound(s) completed  Wound Care: done per plan of care  Supplies: reviewed  Education provided: importance of repositioning and plan of care  Discussed plan of care with Patient and Nurse    Kristie Norris RN

## 2019-06-19 NOTE — PROGRESS NOTES
[facility]  Patient is currently open to home care services with [facility]. The patient is currently receiving Skilled Nursing and PT services. [facility]  and team have been notified of patient admission. [facility] liaison will continue to follow patient during stay. If appropriate provide orders to resume home care at time of discharge.

## 2019-06-19 NOTE — PLAN OF CARE
DATE & TIME: 6/18/2019 3336-1661   Cognitive Concerns/ Orientation : HECTOR, calm   BEHAVIOR & AGGRESSION TOOL COLOR: green to yellow, hits staff with left arm when performing perineal care.   CIWA SCORE: na  ABNL VS/O2: vss, on RA  MOBILITY: up with lift, Turn Q 2 hours with Ax2.   PAIN MANAGMENT: denied pain, appeared to be comfortable.   DIET: Strict NPO, on TF 7 am to 7pm  BOWEL/BLADDER: INC of B&B, loose stool x1. Condom catheter in place.   ABNL LAB/BG: na   DRAIN/DEVICES: condom catheter, G/J, PIV on left arm, infusing.   TELEMETRY RHYTHM: na   SKIN: Very red/irritated perineal area, antifungal power applied. Wound care consult in place, will see tomorrow.   TESTS/PROCEDURES: na   D/C DAY/GOALS/PLACE: pending   OTHER IMPORTANT INFO:

## 2019-06-20 VITALS
OXYGEN SATURATION: 94 % | WEIGHT: 159.7 LBS | SYSTOLIC BLOOD PRESSURE: 111 MMHG | RESPIRATION RATE: 18 BRPM | BODY MASS INDEX: 22.86 KG/M2 | DIASTOLIC BLOOD PRESSURE: 68 MMHG | TEMPERATURE: 98.5 F | HEART RATE: 117 BPM | HEIGHT: 70 IN

## 2019-06-20 LAB
ANION GAP SERPL CALCULATED.3IONS-SCNC: 6 MMOL/L (ref 3–14)
BUN SERPL-MCNC: 9 MG/DL (ref 7–30)
CALCIUM SERPL-MCNC: 8.3 MG/DL (ref 8.5–10.1)
CHLORIDE SERPL-SCNC: 111 MMOL/L (ref 94–109)
CO2 SERPL-SCNC: 26 MMOL/L (ref 20–32)
CREAT SERPL-MCNC: 0.82 MG/DL (ref 0.66–1.25)
CREAT SERPL-MCNC: 0.84 MG/DL (ref 0.66–1.25)
ERYTHROCYTE [DISTWIDTH] IN BLOOD BY AUTOMATED COUNT: 16 % (ref 10–15)
GFR SERPL CREATININE-BSD FRML MDRD: >90 ML/MIN/{1.73_M2}
GFR SERPL CREATININE-BSD FRML MDRD: >90 ML/MIN/{1.73_M2}
GLUCOSE SERPL-MCNC: 132 MG/DL (ref 70–99)
HCT VFR BLD AUTO: 35 % (ref 40–53)
HGB BLD-MCNC: 11.4 G/DL (ref 13.3–17.7)
MCH RBC QN AUTO: 27.8 PG (ref 26.5–33)
MCHC RBC AUTO-ENTMCNC: 32.6 G/DL (ref 31.5–36.5)
MCV RBC AUTO: 85 FL (ref 78–100)
PLATELET # BLD AUTO: 156 10E9/L (ref 150–450)
POTASSIUM SERPL-SCNC: 3.4 MMOL/L (ref 3.4–5.3)
RBC # BLD AUTO: 4.1 10E12/L (ref 4.4–5.9)
SODIUM SERPL-SCNC: 143 MMOL/L (ref 133–144)
WBC # BLD AUTO: 6.4 10E9/L (ref 4–11)

## 2019-06-20 PROCEDURE — 82565 ASSAY OF CREATININE: CPT | Performed by: HOSPITALIST

## 2019-06-20 PROCEDURE — 25000125 ZZHC RX 250: Performed by: HOSPITALIST

## 2019-06-20 PROCEDURE — 85027 COMPLETE CBC AUTOMATED: CPT | Performed by: HOSPITALIST

## 2019-06-20 PROCEDURE — 25000132 ZZH RX MED GY IP 250 OP 250 PS 637: Mod: GY | Performed by: HOSPITALIST

## 2019-06-20 PROCEDURE — 94640 AIRWAY INHALATION TREATMENT: CPT

## 2019-06-20 PROCEDURE — 25000128 H RX IP 250 OP 636: Performed by: HOSPITALIST

## 2019-06-20 PROCEDURE — 36415 COLL VENOUS BLD VENIPUNCTURE: CPT | Performed by: HOSPITALIST

## 2019-06-20 PROCEDURE — 99238 HOSP IP/OBS DSCHRG MGMT 30/<: CPT | Performed by: INTERNAL MEDICINE

## 2019-06-20 PROCEDURE — 40000275 ZZH STATISTIC RCP TIME EA 10 MIN

## 2019-06-20 PROCEDURE — 80048 BASIC METABOLIC PNL TOTAL CA: CPT | Performed by: HOSPITALIST

## 2019-06-20 PROCEDURE — 94640 AIRWAY INHALATION TREATMENT: CPT | Mod: 76

## 2019-06-20 PROCEDURE — A9270 NON-COVERED ITEM OR SERVICE: HCPCS | Mod: GY | Performed by: HOSPITALIST

## 2019-06-20 RX ORDER — PROCHLORPERAZINE MALEATE 5 MG
10 TABLET ORAL EVERY 6 HOURS PRN
Status: DISCONTINUED | OUTPATIENT
Start: 2019-06-20 | End: 2019-06-20 | Stop reason: HOSPADM

## 2019-06-20 RX ORDER — PROCHLORPERAZINE 25 MG
25 SUPPOSITORY, RECTAL RECTAL EVERY 12 HOURS PRN
Status: DISCONTINUED | OUTPATIENT
Start: 2019-06-20 | End: 2019-06-20 | Stop reason: HOSPADM

## 2019-06-20 RX ADMIN — ACETYLCYSTEINE 2 ML: 200 SOLUTION ORAL; RESPIRATORY (INHALATION) at 11:20

## 2019-06-20 RX ADMIN — MINERAL SUPPLEMENT IRON 300 MG / 5 ML STRENGTH LIQUID 100 PER BOX UNFLAVORED 220 MG: at 09:26

## 2019-06-20 RX ADMIN — ALBUTEROL SULFATE 2.5 MG: 2.5 SOLUTION RESPIRATORY (INHALATION) at 11:21

## 2019-06-20 RX ADMIN — CARBAMAZEPINE 150 MG: 100 SUSPENSION ORAL at 05:56

## 2019-06-20 RX ADMIN — PIPERACILLIN SODIUM,TAZOBACTAM SODIUM 4.5 G: 4; .5 INJECTION, POWDER, FOR SOLUTION INTRAVENOUS at 09:25

## 2019-06-20 RX ADMIN — BRIVARACETAM 100 MG: 10 SOLUTION ORAL at 09:25

## 2019-06-20 RX ADMIN — LEVOTHYROXINE SODIUM 150 MCG: 150 TABLET ORAL at 06:39

## 2019-06-20 RX ADMIN — Medication 40 MG: at 09:28

## 2019-06-20 RX ADMIN — POTASSIUM & SODIUM PHOSPHATES POWDER PACK 280-160-250 MG 1 PACKET: 280-160-250 PACK at 09:26

## 2019-06-20 RX ADMIN — METOCLOPRAMIDE 10 MG: 5 SOLUTION ORAL at 09:25

## 2019-06-20 RX ADMIN — HYDROCORTISONE 20 MG: 20 TABLET ORAL at 09:26

## 2019-06-20 RX ADMIN — ALBUTEROL SULFATE 2.5 MG: 2.5 SOLUTION RESPIRATORY (INHALATION) at 07:57

## 2019-06-20 RX ADMIN — PIPERACILLIN SODIUM,TAZOBACTAM SODIUM 4.5 G: 4; .5 INJECTION, POWDER, FOR SOLUTION INTRAVENOUS at 03:28

## 2019-06-20 RX ADMIN — ACETYLCYSTEINE 2 ML: 200 SOLUTION ORAL; RESPIRATORY (INHALATION) at 07:57

## 2019-06-20 ASSESSMENT — ACTIVITIES OF DAILY LIVING (ADL)
ADLS_ACUITY_SCORE: 41

## 2019-06-20 NOTE — PLAN OF CARE
Date: 06/19/19 [id number]  Cognitive Concerns/ Orientation : HECTOR, calm   BEHAVIOR & AGGRESSION TOOL COLOR: green to yellow, combative with cares.   CIWA SCORE: na  ABNL VS/O2: VSS, on RA  MOBILITY: up with lift, Turn Q 2 hours with Ax2.   PAIN MANAGMENT: FLACC score of 0  DIET: Strict NPO, on TF stopped at 1900  BOWEL/BLADDER: INC of B&B, loose stool x1. Will not keep condom cath in place.  Incontinent, excoriated bottom, barrier cream with cares.  Miconazole powder to groin redness.  ABNL LAB/BG: na   DRAIN/DEVICES: G/J, PIV on left arm, infusing NS @ 75.  Intermittent Zosyn.   TELEMETRY RHYTHM: na   SKIN: Very red/irritated perineal area, antifungal power applied. Wound care consult in place, will see tomorrow.   TESTS/PROCEDURES: na   D/C DAY/GOALS/PLACE: pending   OTHER IMPORTANT INFO: Contact ISO

## 2019-06-20 NOTE — DISCHARGE SUMMARY
[facility]  Hospitalist Discharge Summary       Date of Admission:  6/17/2019  Date of Discharge:  6/20/2019  Discharging Provider: Yanely Liriano MD    Discharge Diagnoses   Aspiration pneumonia with history of recurrent aspiration pneumonitis.  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  History of seizure disorder.  Panhypopituitarism.  Hypothyroidism.  Hypogonadism.  History of enlarging pancreatic tail cyst    Follow-ups Needed After Discharge   Follow-up Appointments     Follow-up and recommended labs and tests      Follow up with primary care provider, Carlos Gomez, within 7 days to   evaluate treatment change and for hospital follow- up.  No follow up labs   or test are needed.             Unresulted Labs Ordered in the Past 30 Days of this Admission     Date and Time Order Name Status Description    6/17/2019 1825 Blood culture Preliminary     6/17/2019 1825 Blood culture Preliminary       These results will be followed up by PCP    Discharge Disposition   Discharged to home  Condition at discharge: Stable    Hospital Course   Mr. Keyon Farias is a 58-year-old male with traumatic brain injury with resultant aphasia and nonverbal status, spastic paraplegia, seizure, panhypopituitarism, GERD, and recurrent aspiration pneumonia, who was brought to the ER with fever. This will be his 12th or 13th admissions this year with similar presentation.   Patient care was assumed this morning, here are further details regarding his current hospitalization.     # Recurrent aspiration pneumonia versus pneumonitis with sepsis  # Enterococcus fecalis UTI  - He has had frequent recurrence despite tube feeding and aspiration precautions.However,he recovers quickly from febrile event following aspiration potential suggesting pneumonitis rather than pneumonia, has been evaluated by ID on several occasions; palliative care discussions has been thought  about before but family resistant.  - Fever and leukocytosis (transient elevation of wbc to 18 likely sec to steroids, got one dose of IV hydrocortisone and on chronic steroids)  - Blood culture from 6/17 with no growth so far but urine cultures from 6/17 growing Enterococcus fecalis sensitive to penicillin--- not sure if true infection, getting better with zosyn for pneumonia;  d/w Dr Holder from ID who recommended to continue zosyn and can switch to PO Augmentin on discharge  - Patient is discharged on oral Augmentin   - Nutrition via tube feeding after discharge   - Continue PTA Mucomyst and Albuterol nebs after discharge   - Discontinued IV fluids      Traumatic brain injury with sequelae of aphasia and spastic paralysis:   He gets home care services.    - Resumed home care on discharge.     Seizure disorder:    - continue PTA Tegretol and Briviact at discharge      Panhypopituitarism:    - continue PTA hydrocortisone 20 mg in morning and 10 mg at night after discharge.     Hypothyroidism.   -  Continue PTA Synthroid      Gonadotropin, hypogonadism.   - Resume PTA testosterone injection in the outpatient setting.      History of enlarging pancreatic tail cyst.  He has history of necrotizing pancreatitis requiring cystogastrostomy with a stent placement and subsequent removal.  During one of his recent hospitalization, a CT was noted with enlarging cyst.  This was evaluated by Dr. Davis from GI during last hospitalization, was suggested a followup CT scan in 6 months.      Code status.  This has been discussed on multiple occasions with his mom and they have decided him to be Full code     Patient was seen and examined on the day of discharge , seems stable , non verbal, not in any discomfort, doing thumbs up when told he will be leaving hospital , His Mother is in agreement with the discharge plan .He is discharged in stable condition back to his home.    Consultations This Hospital Stay   NUTRITION  SERVICES ADULT IP CONSULT  SOCIAL WORK IP CONSULT  PHARMACY IP CONSULT  WOUND OSTOMY CONTINENCE NURSE  IP CONSULT    Code Status   Full Code    Time Spent on this Encounter   I, Yanely Liriano, personally saw the patient today and spent less than or equal to 30 minutes discharging this patient.       Yanely Liriano MD  [facility]  ______________________________________________________________________    Physical Exam   Vital Signs: Temp: 98.5  F (36.9  C) Temp src: Oral BP: 111/68   Heart Rate: 104 Resp: 18 SpO2: 94 % O2 Device: None (Room air)    Weight: 159 lbs 11.2 oz    Constitutional: Alert, awake mostly nonverbal but follows simple commands and interacts minimally with yes, no, of nodding, and he uses thumbs up for affirmative answers. ; comfortable in no apparent distress; very cheerful today, excited about discharge    HEENT: Pupils equal and reactive to light and accomodation, EOMI intact; neck supple no raised JVD or rigidity    Oral cavity: Moist mucosa , poor dental hygeine   Cardiovascular: Normal s1 s2, regular rate and rhythm, no murmur   Lungs: B/l clear to auscultation, no wheezes or crepitations   Abdomen: Soft, nontender, nondistended.  A feeding tube is in place.  No guarding, rigidity or rebound tenderness.  Bowel sounds are present   LE : No edema   Musculoskeletal/ neuro: He has spastic hemiplegia on the right and is mostly nonverbal        Primary Care Physician   Carlos Gomez    Discharge Orders      Home care nursing referral      Reason for your hospital stay    You were admitted to the hospital secondary to Aspiration PNA     Follow-up and recommended labs and tests    Follow up with primary care provider, Carlos Gomez, within 7 days to evaluate treatment change and for hospital follow- up.  No follow up labs or test are needed.     Activity    Your activity upon discharge: activity as tolerated and no driving for today     Discharge Instructions    You are discharged on  Augmentin , please finish the course after discharge.     MD face to face encounter    Documentation of Face to Face and Certification for Home Health Services    I certify that patient: Keyon Farias is under my care and that I, or a nurse practitioner or physician's assistant working with me, had a face-to-face encounter that meets the physician face-to-face encounter requirements with this patient on: 6/20/2019.    Please resume HHC after Discharge thanks     Based on the above findings. I certify that this patient is confined to the home. This patient will be followed by a physician who will periodically review the plan of care.  Physician/Provider to provide follow up care: Carlos Gomez    Attending hospital physician (the Medicare certified [place] provider): Yanely Liriano  Physician Signature: See electronic signature associated with these discharge orders.  Date: 6/20/2019     Full Code     Diet    Follow this diet upon discharge: Orders Placed This Encounter      Adult Formula Drip Feeding: Continuous Isosource 1.5; Jejunostomy; Goal Rate: 100; mL/hr; From: 7:00 AM; 7:00 PM; Medication - Feeding Tube Flush Frequency: At least 15-30 mL water before and after medication administration and with tube clogging;...         Significant Results and Procedures   Results for orders placed or performed during the hospital encounter of 06/17/19   XR Chest 2 Views    Narrative    CHEST TWO VIEWS  6/17/2019 7:23 PM     HISTORY: Fever. Bedridden, nonverbal, vomiting.    COMPARISON: 5/26/2019.      Impression    IMPRESSION: Elevated right diaphragm. Shallow inspiration. Infiltrates  posteriorly in the lungs noted on lateral view, new since previous  exam.      VENICE KENNEDY MD       Discharge Medications   Current Discharge Medication List      CONTINUE these medications which have CHANGED    Details   amoxicillin-clavulanate (AUGMENTIN) 875-125 MG tablet Take 1 tablet by mouth every 12 hours for 7 days  Qty: 14 tablet,  Refills: 0    Associated Diagnoses: Pneumonia of both lower lobes due to infectious organism (H)         CONTINUE these medications which have NOT CHANGED    Details   acetaminophen (TYLENOL) 500 MG tablet 1,000 mg by Oral or FT or NG tube route every 6 hours as needed for mild pain      acetylcysteine (MUCOMYST) 20 % neb solution Take 2 mLs by nebulization 4 times daily  Qty: 300 vial, Refills: 0    Associated Diagnoses: Pneumonia of both lower lobes due to infectious organism (H)      albuterol (PROVENTIL) (5 MG/ML) 0.5% neb solution Take 2.5 mg by nebulization 4 times daily 0700 1100 1500 1900 with mucomyst      bacitracin ointment Apply topically daily as needed for wound care To PEG site.       Brivaracetam (BRIVIACT) 10 MG/ML solution 100 mg by Oral or FT or NG tube route 2 times daily @0900 and 2100      calcium carbonate 1250 (500 CA) MG/5ML SUSP suspension 5 mLs (1,250 mg) by Per J Tube route 3 times daily (with meals)  Qty: 450 mL    Associated Diagnoses: Malnutrition (H)      carBAMazepine (TEGRETOL) 100 MG/5ML suspension Take 150 mg by mouth every 6 hours At 06:00, 12:00, 18:00 and 24:00 for seizures      ferrous sulfate 220 (44 Fe) MG/5ML ELIX 220 mg by Per Feeding Tube route daily      glycopyrrolate (GLYCATE) 2 MG tablet Take 1-2 mg by mouth 2 times daily as needed (secretions) 1/2 to 1 tablet (1 - 2 mg) up to twice daily if needed for secretions.      !! hydrocortisone (CORTEF) 5 MG tablet 10 mg by Oral or FT or NG tube route daily (with dinner) At 1500      !! hydrocortisone (CORTEF) 5 MG tablet 20 mg by Oral or FT or NG tube route every morning       levothyroxine (SYNTHROID/LEVOTHROID) 150 MCG tablet 150 mcg by Per Feeding Tube route every morning Confirmed dose with Georgina -      melatonin (MELATONIN) 1 MG/ML LIQD liquid 6 mg by Per NG tube route At Bedtime       metoclopramide (REGLAN) 5 MG/5ML solution 10 mLs (10 mg) by Per J Tube route 3 times daily  Qty: 240 mL, Refills: 0    Associated  Diagnoses: Pneumonia of both lower lobes due to infectious organism (H)      Multiple Vitamins-Minerals (CENTRUM SILVER) per tablet Take 1 tablet by mouth daily Crush and feed via j-tube      pantoprazole (PROTONIX) 2 mg/mL SUSP suspension 20 mLs (40 mg) by Per J Tube route daily  Qty: 400 mL, Refills: 1    Associated Diagnoses: Gastroesophageal reflux disease, esophagitis presence not specified      potassium & sodium phosphates (NEUTRA-PHOS) 280-160-250 MG Packet Take 1 packet by mouth 3 times daily 0900, 1500, 2100.       testosterone cypionate (DEPOTESTOTERONE CYPIONATE) 200 MG/ML injection Inject 76 mg into the muscle See Admin Instructions Every 2 weeks on Fridays   76 mg or 0.38 mL      Vitamin D, Cholecalciferol, 1000 units TABS Take 2,000 Units by mouth every morning Crush and feed via j-tube      Guar Gum (FIBER MODULAR, NUTRISOURCE FIBER,) packet 1 packet by Per G Tube route daily  Qty: 30 packet, Refills: 0    Associated Diagnoses: Pneumonia of both lower lobes due to infectious organism (H)      order for DME Equipment being ordered: Nebulizer  Qty: 1 Units, Refills: 0    Associated Diagnoses: Pneumonia of both lower lobes due to infectious organism (H)       !! - Potential duplicate medications found. Please discuss with provider.      STOP taking these medications       ciprofloxacin (CIPRO) 500 MG tablet Comments:   Reason for Stopping:             Allergies   Allergies   Allergen Reactions     Dilantin [Phenytoin Sodium]      Valproic Acid      Toxicity c bone marrow suspension, elevated ammonia levels

## 2019-06-20 NOTE — PLAN OF CARE
Cognitive Concerns/ Orientation : HECTOR; Hx TBI with aphasia, nonverbal   BEHAVIOR & AGGRESSION TOOL COLOR: GREEN  CIWA SCORE:   n/a        ABNL VS/O2: VSS. RA  MOBILITY: Ax2; T/R  PAIN MANAGMENT:   DIET: NPO; tube feedings   BOWEL/BLADDER: incontinent   ABNL LAB/BG:   DRAIN/DEVICES: IVF discontinued, PIV removed.  TELEMETRY RHYTHM:   SKIN: memo area reddened; reddened buttocks, improving with barrier cream.  Much pinker today.  TESTS/PROCEDURES:   D/C DAY/GOALS/PLACE: discharge orders received.  OTHER IMPORTANT INFO:

## 2019-06-20 NOTE — PROGRESS NOTES
DATE & TIME: 2300-0730 6/20/19    Cognitive Concerns/ Orientation : HECTOR; Hx TBI with aphasia, nonverbal   BEHAVIOR & AGGRESSION TOOL COLOR: GREEN; didn't like doing oral cares tonight so much and was biting  CIWA SCORE:    ABNL VS/O2: VSS. RA  MOBILITY: Ax2; T/R  PAIN MANAGMENT:   DIET: NPO; tube feedings   BOWEL/BLADDER: incontinent   ABNL LAB/BG:   DRAIN/DEVICES: NS @75  TELEMETRY RHYTHM:   SKIN: memo area reddened; reddened buttocks.   TESTS/PROCEDURES:   D/C DAY/GOALS/PLACE: Discharge likely this AM if remains stable, per MD note  OTHER IMPORTANT INFO:

## 2019-07-29 DIAGNOSIS — J69.0 ASPIRATION PNEUMONIA (H): Primary | ICD-10-CM

## 2019-07-31 NOTE — TELEPHONE ENCOUNTER
RECORDS RECEIVED FROM: Internal   DATE RECEIVED: 9.27.19   NOTES STATUS DETAILS   OFFICE NOTE from referring provider N/A    OFFICE NOTE from other specialist Internal 6.14.19  4.26.19  4.3.19  3.14.19  3.7.19  More in EPIC   DISCHARGE SUMMARY from hospital Internal 6.17.19  5.6.19  2.27.19  [facility]   DISCHARGE REPORT from the ER Internal 5.26.19  5.16.19   MEDICATION LIST Internal    IMAGING  (NEED IMAGES AND REPORTS)     CT SCAN Internal 5.6.19  9.18.18   CHEST XRAY (CXR) Internal 6.17.19  5.26.19  5.16.19  5.15.19  More in EPIC   TESTS     PULMONARY FUNCTION TESTING (PFT) In process Scheduled for 9.27.19

## 2019-08-01 ENCOUNTER — APPOINTMENT (OUTPATIENT)
Dept: LAB | Facility: CLINIC | Age: 59
End: 2019-08-01
Attending: INTERNAL MEDICINE
Payer: MEDICARE

## 2019-08-06 ENCOUNTER — NURSE TRIAGE (OUTPATIENT)
Dept: NURSING | Facility: CLINIC | Age: 59
End: 2019-08-06

## 2019-08-06 NOTE — TELEPHONE ENCOUNTER
Mom reports cracked G/J tube.  Advised ED, mom wants to go directly to dept that exchanges his tubes.  Did advise ED as unable to otherwise locate any documentation in Epic about where / whom has previously replaced tube.  Advised ED due to time of day.  Phone disconnected.     Reason for Disposition    [1] G-tube is broken or cracked AND [2] is not usable    Protocols used: FEEDING TUBE SYMPTOMS AND [id number]

## 2019-08-07 ENCOUNTER — HOSPITAL ENCOUNTER (OUTPATIENT)
Facility: CLINIC | Age: 59
Discharge: HOME | End: 2019-08-07
Attending: RADIOLOGY | Admitting: RADIOLOGY
Payer: MEDICARE

## 2019-08-07 ENCOUNTER — APPOINTMENT (OUTPATIENT)
Dept: INTERVENTIONAL RADIOLOGY/VASCULAR | Facility: CLINIC | Age: 59
End: 2019-08-07
Attending: RADIOLOGY
Payer: MEDICARE

## 2019-08-07 VITALS
BODY MASS INDEX: 22.4 KG/M2 | HEART RATE: 73 BPM | OXYGEN SATURATION: 98 % | HEIGHT: 71 IN | WEIGHT: 160 LBS | RESPIRATION RATE: 16 BRPM | TEMPERATURE: 96.9 F | DIASTOLIC BLOOD PRESSURE: 63 MMHG | SYSTOLIC BLOOD PRESSURE: 98 MMHG

## 2019-08-07 DIAGNOSIS — R13.19 OTHER DYSPHAGIA: ICD-10-CM

## 2019-08-07 PROCEDURE — 27210815 ZZ H TUBE GASTRO CR13

## 2019-08-07 PROCEDURE — 40000854 ZZH STATISTIC SIMPLE TUBE INSERTION/CHARGE, PORT, CATH, FISTULOGRAM

## 2019-08-07 PROCEDURE — C1769 GUIDE WIRE: HCPCS

## 2019-08-07 PROCEDURE — 49452 REPLACE G-J TUBE PERC: CPT

## 2019-08-07 RX ORDER — MUPIROCIN 20 MG/G
OINTMENT TOPICAL 2 TIMES DAILY PRN
COMMUNITY
End: 2022-06-17

## 2019-08-07 RX ORDER — NICOTINE POLACRILEX 4 MG
15-30 LOZENGE BUCCAL
Status: CANCELLED | OUTPATIENT
Start: 2019-08-07

## 2019-08-07 RX ORDER — DEXTROSE MONOHYDRATE 25 G/50ML
25-50 INJECTION, SOLUTION INTRAVENOUS
Status: CANCELLED | OUTPATIENT
Start: 2019-08-07

## 2019-08-07 ASSESSMENT — MIFFLIN-ST. JEOR: SCORE: 1577.89

## 2019-08-07 NOTE — PROGRESS NOTES
Care Suites Discharge Nursing Note    Education/questions answered: YES  Patient DC location: discharge door  Accompanied by: mother  CS discharge time: 9874

## 2019-08-07 NOTE — DISCHARGE INSTRUCTIONS
G-tube Exchange Discharge Instructions     After you go home:      You may resume your normal diet    Care of Insertion Site:      For the first 48 hrs, check your puncture site every couple hours while you are awake     You may remove/change the dressing tomorrow    You may shower tomorrow    No tub baths, whirlpools or swimming until your puncture site has fully healed     Activity       You may go back to normal activity in 24 hours    Wait 48 hours before lifting, straining, exercise or other strenuous activity    Medicines:      You may resume all medications    Resume your Warfarin/Coumadin at your regular dose today. Follow up with your provider to have your INR rechecked    Resume your Platelet Inhibitors and Aspirin tomorrow at your regular dose    For minor pain, you may take Acetaminophen (Tylenol) or Ibuprofen (Advil)                 Call the provider who ordered this procedure if:      Blood or fluid is draining from the site    The site is red, swollen, hot, tender or there is foul-smelling drainage    Chills or a fever greater than 101 F (38 C)    Increased pain at the site    Any questions or concerns    Call  911 or go to the Emergency Room if:      Severe pain or trouble breathing    Bleeding that you cannot control      If you have questions call:          [facility] Radiology Dept @ [phone]        The provider who performed your procedure was Dr. Villafana.        Caring for your G-tube    Tube Maintenance:    Possible problems with your tube may include:      Clogged with medications or feedings - most obstructions can be cleared with a small (3cc) syringe and warm water. This may be repeated until the tube is unclogged. This can be prevented by frequently flushing the tube with water (60cc) during the day and always after medications & feedings.      Tube pulls out or falls out -cover the opening with gauze & tape. Call [phone] for further instructions      Skin breakdown  and/or yeast infections at the insertion site - use of skin barrier ointments and anti-fungal powders can treat most site irritations.  Ask your pharmacist or provider for assistance (a prescription is not necessary).    In general, tube problems (including pulled tubes) are NOT emergency situations. Unless the pulling out of a tube is accompanied by uncontrollable bleeding, please DO NOT GO TO THE EMERGENCY ROOM!  Call [phone] with problems.    Tube Care:      Change the gauze dressing every 24 hours and if soiled (dirty).  Stabilize all tubes securely by using gauze and tape.  Clean tube site with soap & water using a cotton applicator (Q-tip) as needed to prevent irritation.    Flush feeding tube with 60cc of warm or room temperature water before and after meds.  To prevent the tube from clogging, ask your provider or pharmacist if liquid forms of your medications are available. If not, crush the pills well & be sure to flush the tube before & after all medications.    Flush feeding tube a minimum of every 4 hours and when feeding is completed with 60cc of water to keep the tube clear and avoid clogging.    Pt may use an abdominal (waist) binder to protect the G-tube.    If there is continued oozing or bleeding, redness, yellow/green/foul smelling drainage    STOP the feedings & use of the tube immediately if there is:      Continued oozing or bleeding at the site    Redness    Yellow/green or foul smelling drainage at the site    Uncontrolled stomach pain    Many of the supplies mentioned above can be purchased at your local pharmacy      For issues with your tube, please call:    [facility] Radiology Dept at [phone]

## 2019-08-07 NOTE — IR NOTE
Interventional Radiology Intra-procedural Nursing Note    Patient Name: Keyon Farias  Medical Record Number: [id number]  Today's Date: August 7, 2019    Start Time: 1455  End of procedure time: 1503  Procedure: Gastrojejunostomy tube Exchange  Report given to: NANCI Tillman in care suites  Time pt departs:  1512    Other Notes: Pt transferred to IR table. Prepped and draped appropriately. No complaints of pain at this time. Timeout recorded. No sedation administered.    20f GJ-tube exchanged by Dr. Villafana. Site secured and dressed with border gauze C/D/I.    Pt. Tolerated procedure well, relaxed.    Caregivers instructed on use of new [employer] syringe kits and connection to tube feedings. Questions and concerns addressed and will order new syringes accordingly.

## 2019-08-07 NOTE — PROGRESS NOTES
Interventional Radiology Pre-Procedure Assessment   Time of Assessment: 2:35 PM    Procedure: Gastro jejunostomy tube exchange    Sedation and procedural consent: Risks, benefits and alternatives were discussed with Patient and Relative Mother    PO Intake: Appropriately NPO for procedure    [facility] CNP ([phone]) (phone [phone])

## 2019-08-07 NOTE — PROGRESS NOTES
Care Suites Admission Nursing Note    Reason for admission: G-J tube change   CS arrival time:~1345  Accompanied by: Mom and caregiver   Name/phone of DC : Mom and caregiver are here   Medications held: no   Consent signed: Yes  Abnormal assessment/labs: N/A  If abnormal, provider notified: N/A  Education/questions answered: Discharge instructions reviewed and questions answered  Plan: Procedure ~ 1430

## 2019-08-12 ENCOUNTER — APPOINTMENT (OUTPATIENT)
Dept: GENERAL RADIOLOGY | Facility: CLINIC | Age: 59
DRG: 853 | End: 2019-08-12
Attending: EMERGENCY MEDICINE
Payer: MEDICARE

## 2019-08-12 ENCOUNTER — HOSPITAL ENCOUNTER (INPATIENT)
Facility: CLINIC | Age: 59
LOS: 4 days | Discharge: HOME HEALTH | DRG: 853 | End: 2019-08-16
Attending: EMERGENCY MEDICINE | Admitting: INTERNAL MEDICINE
Payer: MEDICARE

## 2019-08-12 DIAGNOSIS — J18.9 PNEUMONIA OF BOTH LOWER LOBES DUE TO INFECTIOUS ORGANISM: Primary | ICD-10-CM

## 2019-08-12 DIAGNOSIS — J69.0 ASPIRATION PNEUMONITIS (H): ICD-10-CM

## 2019-08-12 DIAGNOSIS — R65.21 SEPTIC SHOCK (H): ICD-10-CM

## 2019-08-12 DIAGNOSIS — A41.9 SEPTIC SHOCK (H): ICD-10-CM

## 2019-08-12 LAB
ALBUMIN UR-MCNC: 30 MG/DL
ANION GAP SERPL CALCULATED.3IONS-SCNC: 5 MMOL/L (ref 3–14)
APPEARANCE UR: ABNORMAL
BACTERIA #/AREA URNS HPF: ABNORMAL /HPF
BACTERIA SPEC CULT: ABNORMAL
BASOPHILS # BLD AUTO: 0 10E9/L (ref 0–0.2)
BASOPHILS NFR BLD AUTO: 0.3 %
BILIRUB UR QL STRIP: NEGATIVE
BUN SERPL-MCNC: 25 MG/DL (ref 7–30)
C DIFF TOX B STL QL: NEGATIVE
CALCIUM SERPL-MCNC: 8 MG/DL (ref 8.5–10.1)
CHLORIDE SERPL-SCNC: 113 MMOL/L (ref 94–109)
CO2 BLDCOV-SCNC: 22 MMOL/L (ref 21–28)
CO2 BLDCOV-SCNC: 24 MMOL/L (ref 21–28)
CO2 SERPL-SCNC: 26 MMOL/L (ref 20–32)
COLOR UR AUTO: YELLOW
CREAT SERPL-MCNC: 1.08 MG/DL (ref 0.66–1.25)
CREAT SERPL-MCNC: 1.25 MG/DL (ref 0.66–1.25)
DIFFERENTIAL METHOD BLD: ABNORMAL
EOSINOPHIL # BLD AUTO: 0.2 10E9/L (ref 0–0.7)
EOSINOPHIL NFR BLD AUTO: 1.1 %
ERYTHROCYTE [DISTWIDTH] IN BLOOD BY AUTOMATED COUNT: 15.1 % (ref 10–15)
GFR SERPL CREATININE-BSD FRML MDRD: 64 ML/MIN/{1.73_M2}
GFR SERPL CREATININE-BSD FRML MDRD: 76 ML/MIN/{1.73_M2}
GLUCOSE BLDC GLUCOMTR-MCNC: 158 MG/DL (ref 70–99)
GLUCOSE SERPL-MCNC: 167 MG/DL (ref 70–99)
GLUCOSE UR STRIP-MCNC: NEGATIVE MG/DL
GRAM STN SPEC: ABNORMAL
HBA1C MFR BLD: 6.1 % (ref 0–5.6)
HCT VFR BLD AUTO: 41.9 % (ref 40–53)
HGB BLD-MCNC: 13.7 G/DL (ref 13.3–17.7)
HGB UR QL STRIP: NEGATIVE
IMM GRANULOCYTES # BLD: 0.1 10E9/L (ref 0–0.4)
IMM GRANULOCYTES NFR BLD: 0.4 %
KETONES UR STRIP-MCNC: NEGATIVE MG/DL
LACTATE BLD-SCNC: 3 MMOL/L (ref 0.7–2.1)
LACTATE BLD-SCNC: 3.4 MMOL/L (ref 0.7–2.1)
LEUKOCYTE ESTERASE UR QL STRIP: ABNORMAL
LYMPHOCYTES # BLD AUTO: 2.3 10E9/L (ref 0.8–5.3)
LYMPHOCYTES NFR BLD AUTO: 15.4 %
Lab: ABNORMAL
MCH RBC QN AUTO: 28.8 PG (ref 26.5–33)
MCHC RBC AUTO-ENTMCNC: 32.7 G/DL (ref 31.5–36.5)
MCV RBC AUTO: 88 FL (ref 78–100)
MONOCYTES # BLD AUTO: 0.8 10E9/L (ref 0–1.3)
MONOCYTES NFR BLD AUTO: 5.2 %
MUCOUS THREADS #/AREA URNS LPF: PRESENT /LPF
NEUTROPHILS # BLD AUTO: 11.4 10E9/L (ref 1.6–8.3)
NEUTROPHILS NFR BLD AUTO: 77.6 %
NITRATE UR QL: NEGATIVE
NRBC # BLD AUTO: 0 10*3/UL
NRBC BLD AUTO-RTO: 0 /100
PCO2 BLDV: 38 MM HG (ref 40–50)
PCO2 BLDV: 44 MM HG (ref 40–50)
PH BLDV: 7.35 PH (ref 7.32–7.43)
PH BLDV: 7.37 PH (ref 7.32–7.43)
PH UR STRIP: 8 PH (ref 5–7)
PLATELET # BLD AUTO: 186 10E9/L (ref 150–450)
PLATELET # BLD AUTO: 212 10E9/L (ref 150–450)
PO2 BLDV: 20 MM HG (ref 25–47)
PO2 BLDV: 32 MM HG (ref 25–47)
POTASSIUM SERPL-SCNC: 3.9 MMOL/L (ref 3.4–5.3)
RBC # BLD AUTO: 4.75 10E12/L (ref 4.4–5.9)
RBC #/AREA URNS AUTO: 0 /HPF (ref 0–2)
SAO2 % BLDV FROM PO2: 29 %
SAO2 % BLDV FROM PO2: 61 %
SODIUM SERPL-SCNC: 144 MMOL/L (ref 133–144)
SOURCE: ABNORMAL
SP GR UR STRIP: 1.02 (ref 1–1.03)
SPECIMEN SOURCE: ABNORMAL
SPECIMEN SOURCE: ABNORMAL
SPECIMEN SOURCE: NORMAL
SQUAMOUS #/AREA URNS AUTO: 2 /HPF (ref 0–1)
UROBILINOGEN UR STRIP-MCNC: 4 MG/DL (ref 0–2)
WBC # BLD AUTO: 14.7 10E9/L (ref 4–11)
WBC #/AREA URNS AUTO: 6 /HPF (ref 0–5)

## 2019-08-12 PROCEDURE — 85049 AUTOMATED PLATELET COUNT: CPT | Performed by: INTERNAL MEDICINE

## 2019-08-12 PROCEDURE — 87086 URINE CULTURE/COLONY COUNT: CPT | Performed by: EMERGENCY MEDICINE

## 2019-08-12 PROCEDURE — 96375 TX/PRO/DX INJ NEW DRUG ADDON: CPT

## 2019-08-12 PROCEDURE — 82803 BLOOD GASES ANY COMBINATION: CPT

## 2019-08-12 PROCEDURE — 93005 ELECTROCARDIOGRAM TRACING: CPT | Mod: 59

## 2019-08-12 PROCEDURE — 99291 CRITICAL CARE FIRST HOUR: CPT | Mod: 25

## 2019-08-12 PROCEDURE — 20000003 ZZH R&B ICU

## 2019-08-12 PROCEDURE — 83036 HEMOGLOBIN GLYCOSYLATED A1C: CPT | Performed by: INTERNAL MEDICINE

## 2019-08-12 PROCEDURE — 25000128 H RX IP 250 OP 636: Performed by: EMERGENCY MEDICINE

## 2019-08-12 PROCEDURE — 25000128 H RX IP 250 OP 636: Performed by: INTERNAL MEDICINE

## 2019-08-12 PROCEDURE — 96361 HYDRATE IV INFUSION ADD-ON: CPT

## 2019-08-12 PROCEDURE — 96367 TX/PROPH/DG ADDL SEQ IV INF: CPT

## 2019-08-12 PROCEDURE — 87186 SC STD MICRODIL/AGAR DIL: CPT | Performed by: INTERNAL MEDICINE

## 2019-08-12 PROCEDURE — 76937 US GUIDE VASCULAR ACCESS: CPT

## 2019-08-12 PROCEDURE — 83605 ASSAY OF LACTIC ACID: CPT

## 2019-08-12 PROCEDURE — 85025 COMPLETE CBC W/AUTO DIFF WBC: CPT | Performed by: EMERGENCY MEDICINE

## 2019-08-12 PROCEDURE — 87205 SMEAR GRAM STAIN: CPT | Performed by: EMERGENCY MEDICINE

## 2019-08-12 PROCEDURE — 25800030 ZZH RX IP 258 OP 636: Performed by: EMERGENCY MEDICINE

## 2019-08-12 PROCEDURE — 36556 INSERT NON-TUNNEL CV CATH: CPT

## 2019-08-12 PROCEDURE — 81001 URINALYSIS AUTO W/SCOPE: CPT | Performed by: EMERGENCY MEDICINE

## 2019-08-12 PROCEDURE — 00000146 ZZHCL STATISTIC GLUCOSE BY METER IP

## 2019-08-12 PROCEDURE — 82565 ASSAY OF CREATININE: CPT | Performed by: INTERNAL MEDICINE

## 2019-08-12 PROCEDURE — 87493 C DIFF AMPLIFIED PROBE: CPT | Performed by: EMERGENCY MEDICINE

## 2019-08-12 PROCEDURE — 99292 CRITICAL CARE ADDL 30 MIN: CPT

## 2019-08-12 PROCEDURE — 96365 THER/PROPH/DIAG IV INF INIT: CPT

## 2019-08-12 PROCEDURE — 87040 BLOOD CULTURE FOR BACTERIA: CPT | Performed by: EMERGENCY MEDICINE

## 2019-08-12 PROCEDURE — 87640 STAPH A DNA AMP PROBE: CPT | Performed by: INTERNAL MEDICINE

## 2019-08-12 PROCEDURE — 87641 MR-STAPH DNA AMP PROBE: CPT | Performed by: INTERNAL MEDICINE

## 2019-08-12 PROCEDURE — 25000125 ZZHC RX 250: Performed by: EMERGENCY MEDICINE

## 2019-08-12 PROCEDURE — 71045 X-RAY EXAM CHEST 1 VIEW: CPT

## 2019-08-12 PROCEDURE — 25000131 ZZH RX MED GY IP 250 OP 636 PS 637: Mod: GY | Performed by: INTERNAL MEDICINE

## 2019-08-12 PROCEDURE — 80048 BASIC METABOLIC PNL TOTAL CA: CPT | Performed by: EMERGENCY MEDICINE

## 2019-08-12 PROCEDURE — 3E043XZ INTRODUCTION OF VASOPRESSOR INTO CENTRAL VEIN, PERCUTANEOUS APPROACH: ICD-10-PCS | Performed by: ANESTHESIOLOGY

## 2019-08-12 PROCEDURE — 87077 CULTURE AEROBIC IDENTIFY: CPT | Performed by: INTERNAL MEDICINE

## 2019-08-12 PROCEDURE — 99291 CRITICAL CARE FIRST HOUR: CPT | Mod: AI | Performed by: INTERNAL MEDICINE

## 2019-08-12 PROCEDURE — 51702 INSERT TEMP BLADDER CATH: CPT

## 2019-08-12 PROCEDURE — 87088 URINE BACTERIA CULTURE: CPT | Performed by: EMERGENCY MEDICINE

## 2019-08-12 PROCEDURE — 25000132 ZZH RX MED GY IP 250 OP 250 PS 637: Mod: GY | Performed by: INTERNAL MEDICINE

## 2019-08-12 PROCEDURE — 87506 IADNA-DNA/RNA PROBE TQ 6-11: CPT | Performed by: EMERGENCY MEDICINE

## 2019-08-12 PROCEDURE — 25000132 ZZH RX MED GY IP 250 OP 250 PS 637: Mod: GY | Performed by: EMERGENCY MEDICINE

## 2019-08-12 RX ORDER — ASPIRIN 81 MG/1
81 TABLET, CHEWABLE ORAL DAILY
Status: ON HOLD | COMMUNITY
End: 2022-07-01

## 2019-08-12 RX ORDER — PIPERACILLIN SODIUM, TAZOBACTAM SODIUM 3; .375 G/15ML; G/15ML
3.38 INJECTION, POWDER, LYOPHILIZED, FOR SOLUTION INTRAVENOUS ONCE
Status: COMPLETED | OUTPATIENT
Start: 2019-08-12 | End: 2019-08-12

## 2019-08-12 RX ORDER — AMOXICILLIN 250 MG
2 CAPSULE ORAL 2 TIMES DAILY PRN
Status: DISCONTINUED | OUTPATIENT
Start: 2019-08-12 | End: 2019-08-16 | Stop reason: HOSPADM

## 2019-08-12 RX ORDER — ONDANSETRON 4 MG/1
4 TABLET, ORALLY DISINTEGRATING ORAL EVERY 6 HOURS PRN
Status: DISCONTINUED | OUTPATIENT
Start: 2019-08-12 | End: 2019-08-16 | Stop reason: HOSPADM

## 2019-08-12 RX ORDER — NALOXONE HYDROCHLORIDE 0.4 MG/ML
.1-.4 INJECTION, SOLUTION INTRAMUSCULAR; INTRAVENOUS; SUBCUTANEOUS
Status: DISCONTINUED | OUTPATIENT
Start: 2019-08-12 | End: 2019-08-16 | Stop reason: HOSPADM

## 2019-08-12 RX ORDER — CIPROFLOXACIN 2 MG/ML
400 INJECTION, SOLUTION INTRAVENOUS EVERY 12 HOURS
Status: DISCONTINUED | OUTPATIENT
Start: 2019-08-12 | End: 2019-08-15

## 2019-08-12 RX ORDER — LEVOTHYROXINE SODIUM 150 UG/1
150 TABLET ORAL DAILY
Status: DISCONTINUED | OUTPATIENT
Start: 2019-08-13 | End: 2019-08-16 | Stop reason: HOSPADM

## 2019-08-12 RX ORDER — ALBUTEROL SULFATE 5 MG/ML
2.5 SOLUTION RESPIRATORY (INHALATION) EVERY 6 HOURS PRN
Status: DISCONTINUED | OUTPATIENT
Start: 2019-08-12 | End: 2019-08-12

## 2019-08-12 RX ORDER — AMOXICILLIN 250 MG
1 CAPSULE ORAL 2 TIMES DAILY PRN
Status: DISCONTINUED | OUTPATIENT
Start: 2019-08-12 | End: 2019-08-16 | Stop reason: HOSPADM

## 2019-08-12 RX ORDER — ONDANSETRON 2 MG/ML
4 INJECTION INTRAMUSCULAR; INTRAVENOUS EVERY 6 HOURS PRN
Status: DISCONTINUED | OUTPATIENT
Start: 2019-08-12 | End: 2019-08-16 | Stop reason: HOSPADM

## 2019-08-12 RX ORDER — SODIUM CHLORIDE 9 MG/ML
INJECTION, SOLUTION INTRAVENOUS CONTINUOUS
Status: DISCONTINUED | OUTPATIENT
Start: 2019-08-12 | End: 2019-08-12

## 2019-08-12 RX ORDER — OXYCODONE HYDROCHLORIDE 5 MG/1
5-10 TABLET ORAL
Status: DISCONTINUED | OUTPATIENT
Start: 2019-08-12 | End: 2019-08-16 | Stop reason: HOSPADM

## 2019-08-12 RX ORDER — NOREPINEPHRINE BITARTRATE 0.06 MG/ML
0.03-0.4 INJECTION, SOLUTION INTRAVENOUS CONTINUOUS
Status: DISCONTINUED | OUTPATIENT
Start: 2019-08-12 | End: 2019-08-15

## 2019-08-12 RX ORDER — DEXTROSE MONOHYDRATE 25 G/50ML
25-50 INJECTION, SOLUTION INTRAVENOUS
Status: DISCONTINUED | OUTPATIENT
Start: 2019-08-12 | End: 2019-08-16 | Stop reason: HOSPADM

## 2019-08-12 RX ORDER — PIPERACILLIN SODIUM, TAZOBACTAM SODIUM 3; .375 G/15ML; G/15ML
3.38 INJECTION, POWDER, LYOPHILIZED, FOR SOLUTION INTRAVENOUS EVERY 6 HOURS
Status: DISCONTINUED | OUTPATIENT
Start: 2019-08-12 | End: 2019-08-13

## 2019-08-12 RX ORDER — NICOTINE POLACRILEX 4 MG
15-30 LOZENGE BUCCAL
Status: DISCONTINUED | OUTPATIENT
Start: 2019-08-12 | End: 2019-08-16 | Stop reason: HOSPADM

## 2019-08-12 RX ORDER — ALBUTEROL SULFATE 0.83 MG/ML
2.5 SOLUTION RESPIRATORY (INHALATION) EVERY 6 HOURS
Status: DISCONTINUED | OUTPATIENT
Start: 2019-08-12 | End: 2019-08-15

## 2019-08-12 RX ORDER — SODIUM CHLORIDE 9 MG/ML
1000 INJECTION, SOLUTION INTRAVENOUS CONTINUOUS
Status: DISCONTINUED | OUTPATIENT
Start: 2019-08-12 | End: 2019-08-16

## 2019-08-12 RX ORDER — CARBAMAZEPINE 100 MG/5ML
150 SUSPENSION ORAL EVERY 6 HOURS SCHEDULED
Status: DISCONTINUED | OUTPATIENT
Start: 2019-08-13 | End: 2019-08-16 | Stop reason: HOSPADM

## 2019-08-12 RX ORDER — ASPIRIN 81 MG/1
81 TABLET, CHEWABLE ORAL DAILY
Status: DISCONTINUED | OUTPATIENT
Start: 2019-08-13 | End: 2019-08-16 | Stop reason: HOSPADM

## 2019-08-12 RX ORDER — CARBAMAZEPINE 100 MG/5ML
150 SUSPENSION ORAL ONCE
Status: COMPLETED | OUTPATIENT
Start: 2019-08-12 | End: 2019-08-12

## 2019-08-12 RX ORDER — CHOLECALCIFEROL (VITAMIN D3) 50 MCG
4000 TABLET ORAL DAILY
COMMUNITY

## 2019-08-12 RX ORDER — ALBUTEROL SULFATE 5 MG/ML
2.5 SOLUTION RESPIRATORY (INHALATION) ONCE
Status: DISCONTINUED | OUTPATIENT
Start: 2019-08-12 | End: 2019-08-12

## 2019-08-12 RX ORDER — ACETYLCYSTEINE 200 MG/ML
2 SOLUTION ORAL; RESPIRATORY (INHALATION) ONCE
Status: DISCONTINUED | OUTPATIENT
Start: 2019-08-12 | End: 2019-08-12

## 2019-08-12 RX ORDER — ACETYLCYSTEINE 200 MG/ML
2 SOLUTION ORAL; RESPIRATORY (INHALATION) EVERY 6 HOURS
Status: DISCONTINUED | OUTPATIENT
Start: 2019-08-12 | End: 2019-08-15

## 2019-08-12 RX ADMIN — PIPERACILLIN SODIUM AND TAZOBACTAM SODIUM 3.38 G: 3; .375 INJECTION, POWDER, LYOPHILIZED, FOR SOLUTION INTRAVENOUS at 22:24

## 2019-08-12 RX ADMIN — HYDROCORTISONE SODIUM SUCCINATE 50 MG: 100 INJECTION, POWDER, FOR SOLUTION INTRAMUSCULAR; INTRAVENOUS at 16:57

## 2019-08-12 RX ADMIN — CARBAMAZEPINE 150 MG: 100 SUSPENSION ORAL at 19:23

## 2019-08-12 RX ADMIN — NOREPINEPHRINE BITARTRATE 0.03 MCG/KG/MIN: 1 INJECTION INTRAVENOUS at 17:46

## 2019-08-12 RX ADMIN — INSULIN ASPART 1 UNITS: 100 INJECTION, SOLUTION INTRAVENOUS; SUBCUTANEOUS at 22:20

## 2019-08-12 RX ADMIN — SODIUM CHLORIDE 1000 ML: 9 INJECTION, SOLUTION INTRAVENOUS at 22:19

## 2019-08-12 RX ADMIN — ENOXAPARIN SODIUM 40 MG: 40 INJECTION SUBCUTANEOUS at 22:21

## 2019-08-12 RX ADMIN — CIPROFLOXACIN 400 MG: 2 INJECTION, SOLUTION INTRAVENOUS at 22:27

## 2019-08-12 RX ADMIN — SODIUM CHLORIDE: 9 INJECTION, SOLUTION INTRAVENOUS at 16:28

## 2019-08-12 RX ADMIN — HYDROCORTISONE SODIUM SUCCINATE 50 MG: 100 INJECTION, POWDER, FOR SOLUTION INTRAMUSCULAR; INTRAVENOUS at 22:52

## 2019-08-12 RX ADMIN — ACETAMINOPHEN 1000 MG: 160 SUSPENSION ORAL at 14:19

## 2019-08-12 RX ADMIN — SODIUM CHLORIDE 2178 ML: 9 INJECTION, SOLUTION INTRAVENOUS at 13:15

## 2019-08-12 RX ADMIN — BRIVARACETAM 100 MG: 10 SOLUTION ORAL at 22:48

## 2019-08-12 RX ADMIN — VANCOMYCIN HYDROCHLORIDE 1500 MG: 5 INJECTION, POWDER, LYOPHILIZED, FOR SOLUTION INTRAVENOUS at 19:18

## 2019-08-12 RX ADMIN — SODIUM CHLORIDE 1000 ML: 9 INJECTION, SOLUTION INTRAVENOUS at 15:27

## 2019-08-12 RX ADMIN — CARBAMAZEPINE 150 MG: 100 SUSPENSION ORAL at 23:51

## 2019-08-12 RX ADMIN — PIPERACILLIN SODIUM AND TAZOBACTAM SODIUM 3.38 G: 3; .375 INJECTION, POWDER, LYOPHILIZED, FOR SOLUTION INTRAVENOUS at 14:20

## 2019-08-12 ASSESSMENT — ENCOUNTER SYMPTOMS
FEVER: 1
COUGH: 1

## 2019-08-12 NOTE — ED NOTES
Bed: ED20  Expected date: 8/12/19  Expected time: 12:43 PM  Means of arrival: Ambulance  Comments:  Kerri 1 - 59M fever, pneumonia - ETA 5 min

## 2019-08-12 NOTE — PHARMACY-ADMISSION MEDICATION HISTORY
Admission medication history interview status for the 8/12/2019  admission is complete. See EPIC admission navigator for prior to admission medications     Medication history source reliability:Moderate    Actions taken by pharmacist (provider contacted, etc):  Spoke w/ patient's mother.       Additional medication history information not noted on PTA med list :None    Medication reconciliation/reorder completed by provider prior to medication history? No    Time spent in this activity: 45 minutes    Prior to Admission medications    Medication Sig Last Dose Taking? Auth Provider   acetaminophen (TYLENOL) 500 MG tablet 1,000 mg by Oral or FT or NG tube route every 6 hours as needed for mild pain  at prn Yes Unknown, Entered By History   acetylcysteine (MUCOMYST) 20 % neb solution Take 2 mLs by nebulization 4 times daily  Patient taking differently: Take 2 mLs by nebulization 4 times daily With Albuterol at 07:00, 11:00, 15:00, 19:00 8/12/2019 at 1100 Yes Starr Champion MD   albuterol (PROVENTIL) (5 MG/ML) 0.5% neb solution Take 2.5 mg by nebulization 4 times daily 0700 1100 1500 1900 with mucomyst 8/12/2019 at 1100 Yes Unknown, Entered By History   aspirin (ASA) 81 MG chewable tablet 81 mg by Oral or Feeding Tube route daily At 0900 8/12/2019 at 0900 Yes Unknown, Entered By History   bacitracin ointment Apply topically daily as needed for wound care To PEG site.   at prn Yes Unknown, Entered By History   Brivaracetam (BRIVIACT) 10 MG/ML solution 100 mg by Oral or FT or NG tube route 2 times daily @0900 and 2100 8/12/2019 at 0900 Yes Reported, Patient   calcium carbonate 1250 (500 CA) MG/5ML SUSP suspension 5 mLs (1,250 mg) by Per J Tube route 3 times daily (with meals) 8/12/2019 at 0900 Yes Mariana Venegas MD   carBAMazepine (TEGRETOL) 100 MG/5ML suspension Take 150 mg by mouth every 6 hours At 06:00, 12:00, 18:00 and 24:00 for seizures 8/12/2019 at 1200 Yes Unknown, Entered By History   ferrous sulfate 220  (44 Fe) MG/5ML ELIX 220 mg by Per Feeding Tube route daily 8/12/2019 at 0900 Yes Unknown, Entered By History   glycopyrrolate (GLYCATE) 2 MG tablet Take 1-2 mg by mouth 2 times daily as needed (secretions) 1/2 to 1 tablet (1 - 2 mg) up to twice daily if needed for secretions.  at prn Yes Unknown, Entered By History   Guar Gum (FIBER MODULAR, NUTRISOURCE FIBER,) packet 1 packet by Per G Tube route daily 8/12/2019 at 0900 Yes Starr Champion MD   hydrocortisone (CORTEF) 5 MG tablet 10 mg by Oral or FT or NG tube route daily (with dinner) At 1500 8/11/2019 at 1500 Yes Unknown, Entered By History   hydrocortisone (CORTEF) 5 MG tablet 20 mg by Oral or FT or NG tube route every morning  8/12/2019 at 0900 Yes Unknown, Entered By History   levothyroxine (SYNTHROID/LEVOTHROID) 150 MCG tablet 150 mcg by Per Feeding Tube route every morning Confirmed dose with [facility] - 8/12/2019 at 0500 Yes Unknown, Entered By History   melatonin (MELATONIN) 1 MG/ML LIQD liquid 6 mg by Per NG tube route At Bedtime  8/11/2019 at hs Yes Unknown, Entered By History   Multiple Vitamins-Minerals (CENTRUM SILVER) per tablet Take 1 tablet by mouth daily Crush and feed via j-tube 8/12/2019 at 0900 Yes Unknown, Entered By History   mupirocin (BACTROBAN) 2 % external ointment Apply topically 2 times daily as needed   at prn Yes Reported, Patient   pantoprazole (PROTONIX) 2 mg/mL SUSP suspension 20 mLs (40 mg) by Per J Tube route daily 8/12/2019 at 0900 Yes Washington Connors MD   potassium & sodium phosphates (NEUTRA-PHOS) 280-160-250 MG Packet Take 1 packet by mouth 3 times daily 0900, 1500, 2100.  8/12/2019 at 0900 Yes Unknown, Entered By History   testosterone cypionate (DEPOTESTOTERONE CYPIONATE) 200 MG/ML injection Inject 76 mg into the muscle See Admin Instructions Every 2 weeks on Fridays   76 mg or 0.38 mL 8/12/2019 at Unknown time Yes Unknown, Entered By History   vitamin D3 (CHOLECALCIFEROL) 2000 units (50 mcg) tablet Take 2,000 Units by  mouth daily Crush and feed via j-tube @@ 0900 8/12/2019 at 0900 Yes Unknown, Entered By History   order for DME Equipment being ordered: Nebulizer   Starr Champion MD Carolyn Dahlman, PharmD, BCPS

## 2019-08-12 NOTE — ED PROVIDER NOTES
History     Chief Complaint:  Fever    The history is provided by the EMS personnel. The history is limited by the condition of the patient.      Keyon Farias is a 59 year old male with an extensive medical history that includes: sepsis, thyroid disease, aphasia traumatic brain injury and cardiac arrest who presents to the emergency department for evaluation of a fever. The patient is a frequent visitor to the emergency department for multiple reasons most recently on 08/07 for dysphagia. The patient has a fever and coughing. EMS notes that the left lung sounds abnormal. He was given 500 mLs of fluid en route. The patient has not had any recent trauma. Of note the patient has a J tube replacement on 08/06.  The patient has had sepsis numerous times.  The patient currently receives all feedings by his GJ tube.  The mother who is at bedside reports she wanted patient to start eating food again.  In the past she has attempted to feed him.  He is currently in physical therapy in attempts to allow him to be able to tolerate p.o. intake.  The mother denies giving him anything by mouth recently.    Allergies:  Dilantin  Valproic acid    Medications:    Mucomyst  Proventil  Briviact  Tegretol  Glycate  Fiber Modular  Cortef  Synthroid  Melatonin  Reglan  Protonix  Neutra-phos  Testosterone     Past Medical History:    Aphasia  DVT  GERD  Panhypopituitarism  Pneumonia  Seizures  Septic shock  Spastic hemiplegia   Thyroid disease  Tracheostomy  Traumatic brain injury  Cerebral artery occlusion  Cerebral infarct  UTI  Ventricular fibrillation  Ventricular tachyarrhythmia  Cardiac arrest    Past Surgical History:    Endoscopic ultrasound of upper GI  EGD and Necroectomy  EGD x3  Head and neck surgery  IR Gastro Jejunostomy tube change x 4  Appendectomy  Tube insertion  Orthopedic surgery  Tracheostomy x2  Vascular surgery    Family History:    Cancer    Social History:  The patient was accompanied to the ED by his  mother.  Smoking Status: Former  Smokeless Tobacco: Never  Alcohol Use: No  Drug Use: No  Marital Status:  Single      Review of Systems   Unable to perform ROS: Acuity of condition   Constitutional: Positive for fever.   Respiratory: Positive for cough.      Physical Exam   Vitals:  Patient Vitals for the past 24 hrs:   BP Temp Temp src Pulse Heart Rate Resp SpO2 Weight   08/12/19 1830 114/61 -- -- -- -- -- -- --   08/12/19 1825 102/52 -- -- -- -- -- -- --   08/12/19 1808 (!) 88/53 -- -- -- -- -- -- --   08/12/19 1715 (!) 79/51 -- -- 112 113 (!) 33 -- --   08/12/19 1645 (!) 75/50 -- -- 115 116 14 -- --   08/12/19 1621 (!) 74/49 102.1  F (38.9  C) Axillary 120 117 22 -- --   08/12/19 1600 (!) 74/48 -- -- 116 124 14 -- --   08/12/19 1545 (!) 84/25 -- -- 126 126 -- -- --   08/12/19 1530 (!) 71/55 -- -- 131 128 28 -- --   08/12/19 1515 (!) 72/59 -- -- 133 133 15 -- --   08/12/19 1500 91/49 103.3  F (39.6  C) Axillary 130 129 (!) 38 -- --   08/12/19 1430 (!) 77/69 -- -- 123 122 30 -- --   08/12/19 1415 (!) 86/54 -- -- 125 125 (!) 33 -- --   08/12/19 1400 95/59 -- -- 126 131 (!) 31 97 % --   08/12/19 1345 (!) 89/54 -- -- 124 124 (!) 33 99 % --   08/12/19 1330 92/41 -- -- 125 125 27 -- --   08/12/19 1315 (!) 73/57 -- -- 133 131 30 -- 72.6 kg (160 lb)   08/12/19 1259 (!) 81/57 100.9  F (38.3  C) Axillary 132 132 26 93 % --     Physical Exam  Physical Exam   General:  Sitting on bed. Chronically ill appearing.  HENT:  No obvious trauma to head  Right Ear:  External ear normal.   Left Ear:  External ear normal.   Nose:  Nose normal.   Eyes:  Conjunctivae and EOM are normal. Pupils are equal, round, and reactive.   Neck: Normal range of motion. Neck supple. No tracheal deviation present.   CV:  Normal heart sounds. No murmur heard. Tachycardic.   Pulm/Chest: Effort normal patient has coarse breast sounds bilaterally.   Abd: Soft. No distension. There is no tenderness. There is no rigidity, no rebound and no guarding. 18  Divehi TORY GJ tube in place with no surrounding erythema, tenderness or drainage.  M/S: Normal range of motion. Warm to palpation.  Neuro: Patient is awake and alert.  Spontaneous eye opening.  Skin: Skin is warm and dry. No rash noted. Not diaphoretic. Patient has a notable decubitus ulcer with moderate surrounding erythema and slight skin breakdown.  Psych: Unable to assess     Emergency Department Course   ECG:  Completed at 1303.  Read at 1320.   Rate 13 bpm. AZ interval 116. QRS duration 86. QT/QTc 306/451. P-R-T axes 57 -58 68.  Sinus tachycardia  Left anterior fascicular block  Abnormal ECG  No significant change compared to EKG dated 05/06/19    Imaging:  Radiographic findings were communicated with the patient and family who voiced understanding of the findings.    XR Chest Port 1 view  Right pulmonary basilar hazy opacity which given elevation of the right hemidiaphragm could represent atelectasis. Pneumonia is not excluded. No apparent pleural effusion.  Reading per radiology.    Laboratory:  UA with micro: pH 8.0 (H) Protein Albumin 30 Urobilinogen 4.0 (H) Leukocyte Esterase small WBC 6(H) Bacteria moderate Squamous Epithelial 2 (H) Mucous present o/w negative    1322-ISTAT Lactate: pH 7.35, pCO2 44, pO2 20(L), Bicarbonate 24, Lactic Acid 3.0 (H)  1530 - ISTAT Lactate: pH 7.37, pCO2 38 (L), pO2 32, Bicarbonate 22, Lactic Acid 3.4 (H)    CBC: WBC 14.7 (H) o/w WNL. (HGB 13.7, )  BMP: Chloride 113 (H) Glucose 167 (H)  Calcium 8.0 (L) o/w AWNL (Creatinine 1.25)    Sputum Culture: pending    Urine Culture: Pending  Blood Culture x2: Pending  Stool culture: pending  C diff PCR: pending    Interventions:  1315 NS, 2178 mL, IV bolus  1419 Tylenol 1000 mg PEGJ tube  1420 Zosyn 3.375 mg IV  1527 NS, 1 L, IV bolus followed by 150 ml/hour  1657 Solu-Cortef 50 mg IV  1746 Levophed IV gtt titrate to MAP 65  Vancomycin PTD IV  Mucomyst and albuterol nebs, per patient's home medication  Tegretol 150 mg per TORY  tube, patient's home medication    Procedure:    Central Line Placement     Procedure:  Central Line Placement with Ultrasound Guidance.    Indications: Vascular access and Sepsis (monitoring of mixed venous oxygen saturations)    Consent:  Risks (including but not limited to: pneumothorax,bleeding, infection or artery puncture), benefits and alternatives were discussed with  power of , mother, and consent for procedure was obtained.    Timeout:  Universal protocol was followed. TIME OUT conducted just prior to starting procedure confirmed patient identity, site/side, procedure, patient position, and availability of correct equipment and implants.?  Yes    Procedure note:  Right Femoral and the right femoral area was prepped, cleansed and draped in a sterile fashion.  Mask, gown and gloves were used per sterile protocol.  Patient was then placed into Trendelenburg position and lidocaine was used for local anesthesia.  Landmarks were identified and Vascular probe with ultrasound was used in a sterile fashion for guidence.  Introducer needle was then used to gain access to the central venous circulation.  Using Seldinger technique the  Triple lumen catheter was placed.  Catheter port(s) were aspirated and flushed.  Central line was sutured in place and sterile dressing applied.   Appropriate placement was confirmed by venous nonpulsatile blood obtained.    Patient Status:  Patient tolerated the procedure well.  There were no complications.      POC Ultrasound:  Limited vascular access ultrasound, performed and interpreted by Tyler Epperson DO.    Indication:  Septic Shock    Body location: Right femoral vein    Findings: The right femoral vein and artery were identified.  The right femoral vein was accessed and guidewire placed.  Ultrasound confirmed placement.    IMPRESSION: Central femoral vein access obtained      Emergency Department Course:  Nursing notes and vitals reviewed. 1334 I performed an exam of  the patient as documented above.     IV inserted. Medicine administered as documented above. Blood drawn. This was sent to the lab for further testing, results above.    The patient provided a urine sample here in the emergency department. This was sent for laboratory testing, findings above.     The patient was sent for a chest XR while in the emergency department, findings above.     1400 I rechecked and updated the patient. The patient's mother was in the room and I discussed the patient's condition.    1558 I re-evaluated the patient.     1636 I rechecked the patient and discussed the results of his workup thus far.     1650 Updated mother.  Mother signed consent form for central line placement.    1730 reevaluated patient.    1746 the patient had a left AC peripheral IV.  Levo fed was initiated through this prior to central line placement.     1815 central line placed.  See above procedure note.    1832 Recheck:  I have explained the findings of the above studies with the patient, who has consented to admission to the hospital. The patient will be admitted under the care of Dr. Rowe of the intensivist service, who I have spoken to and who has agreed to continue the patient's care for further work up, evaluation and treatment. All of the patient's questions have been answered.     1845 patient's blood pressure responding well.  Patient's mentation actually seems to be improving as well.  Patient interacting more with staff and attempting to pull out his central line now.  Will initiate soft wrist restraints.    Restraint note:  Within an hour after restraint an in person face to face assessment was completed at 1855, including an evaluation of the patient's immediate reaction to the intervention, behavioral assessment and review/assessment of history, drugs and medications, recent labs, etc., and behavioral condition.  The patient experienced: No adverse physical outcome from seclusion/restraint  initiation.  The intervention of restraint or seclusion needs to continue.    Impression & Plan    CMS Diagnoses:  The patient has signs of Septic Shock as evidenced by:  1. Presence of Sepsis, AND  2. Persistent hypotension defined by the last 2 BP readings within the ONE HOUR follwing completion of the 30mL/kg bolus being low (SBP <90 or MAP <65)    Time septic shock diagnosis confirmed = 1527 as this was the time when Persistent Hypotension present (2 consecutive SBP <90 or MAP <65 within ONE hour after 30cc/kg IVF bolus completed)    3 Hour Septic Shock Bundle Completion:  1. Initial Lactic Acid Result:   Recent Labs   Lab Test 08/12/19  1530 08/12/19  1322 06/17/19  2050   LACT 3.4* 3.0* 2.0     2. Blood Cultures before Antibiotics: Yes  3. Broad Spectrum Antibiotics Administered: Yes, Zosyn at 1420     Anti-infectives (From admission through now)    Start     Dose/Rate Route Frequency Ordered Stop    08/12/19 1339  piperacillin-tazobactam (ZOSYN) 3.375 g vial to attach to  mL bag      3.375 g  over 30 Minutes Intravenous ONCE 08/12/19 1338 08/12/19 1503        4. 2178 ml of IV fluids.  Ideal body weight: 75.3 kg (166 lb 0.1 oz)    Severe Sepsis reassessment:  1. Repeat Lactic Acid Level: 3.4  2. Vasopressors started for Persistent Hypotension defined by the last 2 BP readings within the ONE HOUR follwing completion of the 30mL/kg bolus being low (SBP <90 or MAP <65).  I attest to having performed a repeat sepsis exam and assessment of perfusion at 1845 and the results demonstrate improved perfusion.    Medical Decision Making:  Keyon Farias is a very pleasant 59 year old year old patient who presents to the emergency department with concern of fever.  The patient has evidence of likely aspiration pneumonia.  He has had this numerous times in the past.  He has also had enterococcus UTIs.  The patient was hypotensive and tachycardic when he presented.  In chart review the possibility of palliative care has  been brought up with the mother.  I reviewed this with the patient's mother and she declined that at this time.  We tried aggressive IV hydration.  His lactate remained slightly elevated.  The mother initially prefered not to have a central line.  The patient is on hydrocortisone and was provided a dose IV that was slightly increased secondary to the fever.  This did not increase his blood pressure enough.  After reviewing and great detail the risks and benefits of central line the mother did consent for one.  She reports patient will not stay still or be easily redirectable for a IJ line.  With his hypotension I do not believe sedation is in his best interest.  I reviewed the risk and benefit of the femoral line and she consented for this.  The mother reports he has been on IV pressors numerous times and typically does not need to be on them for a prolonged period of time.  Because of this a femoral central line was then started.  The patient has septic shock likely from aspiration pneumonia.  I spoke to the intensivist, , who has agreed to admit the patient for continued evaluation treatment.  The patient is maintaining his airway well without concern.  I do not believe he needs intubation at this time.  2 blood cultures were obtained prior to antibiotics.  Of note the patient did have an episode of loose stools.  C. difficile and stool culture is sent out.  He exhibited no signs of tenderness on palpation of the abdomen.    >>>Critical Care time:  Total critical care time exclusive of procedures was 45 minutes for this patient.<<<    Diagnosis:    ICD-10-CM    1. Septic shock (H) A41.9 Blood culture    R65.21 Clostridium difficile toxin B PCR     Enteric Bacteria and Virus Panel by MENDY Stool   2. Aspiration pneumonitis (H) J69.0        Disposition:  Admitted to Dr. Rowe    Discharge Medications:  New Prescriptions    No medications on file     I, Steve Hernández, am serving as a scribe on 8/12/2019 at 1:37  PM to personally document services performed by Tyler Epperson DO based on my observations and the provider's statements to me.     Steve Hernández  8/12/2019    EMERGENCY DEPARTMENT       Tyler Epperson DO  08/12/19 1908

## 2019-08-12 NOTE — ED NOTES
Bed: ST03  Expected date:   Expected time:   Means of arrival:   Comments:  For central line placement-room 20

## 2019-08-13 LAB
ANION GAP SERPL CALCULATED.3IONS-SCNC: 7 MMOL/L (ref 3–14)
BACTERIA SPEC CULT: ABNORMAL
BUN SERPL-MCNC: 14 MG/DL (ref 7–30)
C COLI+JEJUNI+LARI FUSA STL QL NAA+PROBE: NOT DETECTED
CALCIUM SERPL-MCNC: 7.7 MG/DL (ref 8.5–10.1)
CHLORIDE SERPL-SCNC: 127 MMOL/L (ref 94–109)
CO2 SERPL-SCNC: 22 MMOL/L (ref 20–32)
CREAT SERPL-MCNC: 0.96 MG/DL (ref 0.66–1.25)
EC STX1 GENE STL QL NAA+PROBE: NOT DETECTED
EC STX2 GENE STL QL NAA+PROBE: NOT DETECTED
ENTERIC PATHOGEN COMMENT: NORMAL
ERYTHROCYTE [DISTWIDTH] IN BLOOD BY AUTOMATED COUNT: 15.2 % (ref 10–15)
GFR SERPL CREATININE-BSD FRML MDRD: 88 ML/MIN/{1.73_M2}
GLUCOSE BLDC GLUCOMTR-MCNC: 111 MG/DL (ref 70–99)
GLUCOSE BLDC GLUCOMTR-MCNC: 140 MG/DL (ref 70–99)
GLUCOSE BLDC GLUCOMTR-MCNC: 147 MG/DL (ref 70–99)
GLUCOSE BLDC GLUCOMTR-MCNC: 200 MG/DL (ref 70–99)
GLUCOSE BLDC GLUCOMTR-MCNC: 238 MG/DL (ref 70–99)
GLUCOSE SERPL-MCNC: 147 MG/DL (ref 70–99)
HCT VFR BLD AUTO: 35.6 % (ref 40–53)
HGB BLD-MCNC: 11.6 G/DL (ref 13.3–17.7)
LACTATE BLD-SCNC: 2.8 MMOL/L (ref 0.7–2)
LACTATE BLD-SCNC: 3.2 MMOL/L (ref 0.7–2)
LACTATE BLD-SCNC: 3.8 MMOL/L (ref 0.7–2)
MAGNESIUM SERPL-MCNC: 2.1 MG/DL (ref 1.6–2.3)
MCH RBC QN AUTO: 28.9 PG (ref 26.5–33)
MCHC RBC AUTO-ENTMCNC: 32.6 G/DL (ref 31.5–36.5)
MCV RBC AUTO: 89 FL (ref 78–100)
MRSA DNA SPEC QL NAA+PROBE: POSITIVE
NOROV GI+II ORF1-ORF2 JNC STL QL NAA+PR: NOT DETECTED
PHOSPHATE SERPL-MCNC: 1.4 MG/DL (ref 2.5–4.5)
PHOSPHATE SERPL-MCNC: 2.2 MG/DL (ref 2.5–4.5)
PLATELET # BLD AUTO: 186 10E9/L (ref 150–450)
POTASSIUM SERPL-SCNC: 3.4 MMOL/L (ref 3.4–5.3)
RBC # BLD AUTO: 4.02 10E12/L (ref 4.4–5.9)
RVA NSP5 STL QL NAA+PROBE: NOT DETECTED
SALMONELLA SP RPOD STL QL NAA+PROBE: NOT DETECTED
SHIGELLA SP+EIEC IPAH STL QL NAA+PROBE: NOT DETECTED
SODIUM SERPL-SCNC: 156 MMOL/L (ref 133–144)
SODIUM SERPL-SCNC: 156 MMOL/L (ref 133–144)
SPECIMEN SOURCE: ABNORMAL
SPECIMEN SOURCE: ABNORMAL
V CHOL+PARA RFBL+TRKH+TNAA STL QL NAA+PR: NOT DETECTED
VANCOMYCIN SERPL-MCNC: 15.6 MG/L
WBC # BLD AUTO: 23.3 10E9/L (ref 4–11)
Y ENTERO RECN STL QL NAA+PROBE: NOT DETECTED

## 2019-08-13 PROCEDURE — 25800030 ZZH RX IP 258 OP 636: Performed by: INTERNAL MEDICINE

## 2019-08-13 PROCEDURE — 25800030 ZZH RX IP 258 OP 636: Performed by: EMERGENCY MEDICINE

## 2019-08-13 PROCEDURE — 25800030 ZZH RX IP 258 OP 636: Performed by: STUDENT IN AN ORGANIZED HEALTH CARE EDUCATION/TRAINING PROGRAM

## 2019-08-13 PROCEDURE — 27210429 ZZH NUTRITION PRODUCT INTERMEDIATE LITER

## 2019-08-13 PROCEDURE — 85027 COMPLETE CBC AUTOMATED: CPT | Performed by: INTERNAL MEDICINE

## 2019-08-13 PROCEDURE — 25000132 ZZH RX MED GY IP 250 OP 250 PS 637: Mod: GY | Performed by: STUDENT IN AN ORGANIZED HEALTH CARE EDUCATION/TRAINING PROGRAM

## 2019-08-13 PROCEDURE — 80202 ASSAY OF VANCOMYCIN: CPT | Performed by: INTERNAL MEDICINE

## 2019-08-13 PROCEDURE — 20000003 ZZH R&B ICU

## 2019-08-13 PROCEDURE — 84100 ASSAY OF PHOSPHORUS: CPT | Performed by: INTERNAL MEDICINE

## 2019-08-13 PROCEDURE — 25000128 H RX IP 250 OP 636: Performed by: INTERNAL MEDICINE

## 2019-08-13 PROCEDURE — 84295 ASSAY OF SERUM SODIUM: CPT | Performed by: INTERNAL MEDICINE

## 2019-08-13 PROCEDURE — 25000125 ZZHC RX 250: Performed by: STUDENT IN AN ORGANIZED HEALTH CARE EDUCATION/TRAINING PROGRAM

## 2019-08-13 PROCEDURE — G0463 HOSPITAL OUTPT CLINIC VISIT: HCPCS

## 2019-08-13 PROCEDURE — 94640 AIRWAY INHALATION TREATMENT: CPT | Mod: 76

## 2019-08-13 PROCEDURE — 83605 ASSAY OF LACTIC ACID: CPT | Performed by: ANESTHESIOLOGY

## 2019-08-13 PROCEDURE — 00000146 ZZHCL STATISTIC GLUCOSE BY METER IP

## 2019-08-13 PROCEDURE — 25800030 ZZH RX IP 258 OP 636

## 2019-08-13 PROCEDURE — 25000128 H RX IP 250 OP 636

## 2019-08-13 PROCEDURE — 80048 BASIC METABOLIC PNL TOTAL CA: CPT | Performed by: INTERNAL MEDICINE

## 2019-08-13 PROCEDURE — 25000125 ZZHC RX 250: Performed by: INTERNAL MEDICINE

## 2019-08-13 PROCEDURE — 25800029 ZZH RX IP 258 OP 250: Performed by: ANESTHESIOLOGY

## 2019-08-13 PROCEDURE — 25000132 ZZH RX MED GY IP 250 OP 250 PS 637: Mod: GY | Performed by: INTERNAL MEDICINE

## 2019-08-13 PROCEDURE — 83735 ASSAY OF MAGNESIUM: CPT | Performed by: INTERNAL MEDICINE

## 2019-08-13 PROCEDURE — 83605 ASSAY OF LACTIC ACID: CPT | Performed by: INTERNAL MEDICINE

## 2019-08-13 PROCEDURE — 40000275 ZZH STATISTIC RCP TIME EA 10 MIN

## 2019-08-13 PROCEDURE — 94640 AIRWAY INHALATION TREATMENT: CPT

## 2019-08-13 RX ORDER — GUAR GUM
1 PACKET (EA) ORAL DAILY
Status: DISCONTINUED | OUTPATIENT
Start: 2019-08-14 | End: 2019-08-16 | Stop reason: HOSPADM

## 2019-08-13 RX ORDER — POTASSIUM CHLORIDE 1500 MG/1
20-40 TABLET, EXTENDED RELEASE ORAL
Status: DISCONTINUED | OUTPATIENT
Start: 2019-08-13 | End: 2019-08-16 | Stop reason: HOSPADM

## 2019-08-13 RX ORDER — POTASSIUM CHLORIDE 7.45 MG/ML
10 INJECTION INTRAVENOUS
Status: DISCONTINUED | OUTPATIENT
Start: 2019-08-13 | End: 2019-08-16 | Stop reason: HOSPADM

## 2019-08-13 RX ORDER — POTASSIUM CL/LIDO/0.9 % NACL 10MEQ/0.1L
10 INTRAVENOUS SOLUTION, PIGGYBACK (ML) INTRAVENOUS
Status: DISCONTINUED | OUTPATIENT
Start: 2019-08-13 | End: 2019-08-16 | Stop reason: HOSPADM

## 2019-08-13 RX ORDER — PIPERACILLIN SODIUM, TAZOBACTAM SODIUM 4; .5 G/20ML; G/20ML
4.5 INJECTION, POWDER, LYOPHILIZED, FOR SOLUTION INTRAVENOUS EVERY 6 HOURS
Status: DISCONTINUED | OUTPATIENT
Start: 2019-08-13 | End: 2019-08-15

## 2019-08-13 RX ORDER — POTASSIUM CHLORIDE 29.8 MG/ML
20 INJECTION INTRAVENOUS
Status: DISCONTINUED | OUTPATIENT
Start: 2019-08-13 | End: 2019-08-16 | Stop reason: HOSPADM

## 2019-08-13 RX ORDER — SODIUM CHLORIDE 450 MG/100ML
INJECTION, SOLUTION INTRAVENOUS CONTINUOUS
Status: DISCONTINUED | OUTPATIENT
Start: 2019-08-13 | End: 2019-08-16 | Stop reason: HOSPADM

## 2019-08-13 RX ORDER — POTASSIUM CHLORIDE 1.5 G/1.58G
20-40 POWDER, FOR SOLUTION ORAL
Status: DISCONTINUED | OUTPATIENT
Start: 2019-08-13 | End: 2019-08-16 | Stop reason: HOSPADM

## 2019-08-13 RX ORDER — MAGNESIUM SULFATE HEPTAHYDRATE 40 MG/ML
4 INJECTION, SOLUTION INTRAVENOUS EVERY 4 HOURS PRN
Status: DISCONTINUED | OUTPATIENT
Start: 2019-08-13 | End: 2019-08-16 | Stop reason: HOSPADM

## 2019-08-13 RX ORDER — AMINO AC/PROTEIN HYDR/WHEY PRO 10G-100/30
1 LIQUID (ML) ORAL EVERY 12 HOURS
Status: DISCONTINUED | OUTPATIENT
Start: 2019-08-13 | End: 2019-08-16 | Stop reason: HOSPADM

## 2019-08-13 RX ADMIN — HYDROCORTISONE SODIUM SUCCINATE 50 MG: 100 INJECTION, POWDER, FOR SOLUTION INTRAMUSCULAR; INTRAVENOUS at 17:26

## 2019-08-13 RX ADMIN — CIPROFLOXACIN 400 MG: 2 INJECTION, SOLUTION INTRAVENOUS at 22:36

## 2019-08-13 RX ADMIN — ALBUTEROL SULFATE 2.5 MG: 2.5 SOLUTION RESPIRATORY (INHALATION) at 13:58

## 2019-08-13 RX ADMIN — CARBAMAZEPINE 150 MG: 100 SUSPENSION ORAL at 06:08

## 2019-08-13 RX ADMIN — ALBUTEROL SULFATE 2.5 MG: 2.5 SOLUTION RESPIRATORY (INHALATION) at 19:34

## 2019-08-13 RX ADMIN — Medication 40 MG: at 08:29

## 2019-08-13 RX ADMIN — ACETYLCYSTEINE 2 ML: 200 SOLUTION ORAL; RESPIRATORY (INHALATION) at 07:49

## 2019-08-13 RX ADMIN — HYDROCORTISONE SODIUM SUCCINATE 50 MG: 100 INJECTION, POWDER, FOR SOLUTION INTRAMUSCULAR; INTRAVENOUS at 11:23

## 2019-08-13 RX ADMIN — ACETAMINOPHEN 650 MG: 160 SUSPENSION ORAL at 06:04

## 2019-08-13 RX ADMIN — LEVOTHYROXINE SODIUM 150 MCG: 150 TABLET ORAL at 06:04

## 2019-08-13 RX ADMIN — HYDROCORTISONE SODIUM SUCCINATE 50 MG: 100 INJECTION, POWDER, FOR SOLUTION INTRAMUSCULAR; INTRAVENOUS at 06:04

## 2019-08-13 RX ADMIN — BRIVARACETAM 100 MG: 10 SOLUTION ORAL at 08:38

## 2019-08-13 RX ADMIN — ENOXAPARIN SODIUM 40 MG: 40 INJECTION SUBCUTANEOUS at 21:42

## 2019-08-13 RX ADMIN — ACETYLCYSTEINE 4 ML: 200 SOLUTION ORAL; RESPIRATORY (INHALATION) at 00:59

## 2019-08-13 RX ADMIN — Medication 1 PACKET: at 22:35

## 2019-08-13 RX ADMIN — CARBAMAZEPINE 150 MG: 100 SUSPENSION ORAL at 15:17

## 2019-08-13 RX ADMIN — INSULIN ASPART 1 UNITS: 100 INJECTION, SOLUTION INTRAVENOUS; SUBCUTANEOUS at 06:22

## 2019-08-13 RX ADMIN — POTASSIUM & SODIUM PHOSPHATES POWDER PACK 280-160-250 MG 1 PACKET: 280-160-250 PACK at 21:43

## 2019-08-13 RX ADMIN — VANCOMYCIN HYDROCHLORIDE 1500 MG: 5 INJECTION, POWDER, LYOPHILIZED, FOR SOLUTION INTRAVENOUS at 06:08

## 2019-08-13 RX ADMIN — ALBUTEROL SULFATE 2.5 MG: 2.5 SOLUTION RESPIRATORY (INHALATION) at 00:59

## 2019-08-13 RX ADMIN — ASPIRIN 81 MG 81 MG: 81 TABLET ORAL at 08:29

## 2019-08-13 RX ADMIN — PIPERACILLIN SODIUM AND TAZOBACTAM SODIUM 3.38 G: 3; .375 INJECTION, POWDER, LYOPHILIZED, FOR SOLUTION INTRAVENOUS at 02:50

## 2019-08-13 RX ADMIN — SODIUM CHLORIDE: 4.5 INJECTION, SOLUTION INTRAVENOUS at 17:25

## 2019-08-13 RX ADMIN — INSULIN ASPART 2 UNITS: 100 INJECTION, SOLUTION INTRAVENOUS; SUBCUTANEOUS at 01:51

## 2019-08-13 RX ADMIN — PIPERACILLIN SODIUM AND TAZOBACTAM SODIUM 3.38 G: 3; .375 INJECTION, POWDER, LYOPHILIZED, FOR SOLUTION INTRAVENOUS at 08:49

## 2019-08-13 RX ADMIN — MULTIVITAMIN 15 ML: LIQUID ORAL at 15:18

## 2019-08-13 RX ADMIN — INSULIN ASPART 2 UNITS: 100 INJECTION, SOLUTION INTRAVENOUS; SUBCUTANEOUS at 20:29

## 2019-08-13 RX ADMIN — ALBUTEROL SULFATE 2.5 MG: 2.5 SOLUTION RESPIRATORY (INHALATION) at 07:49

## 2019-08-13 RX ADMIN — SODIUM CHLORIDE, POTASSIUM CHLORIDE, SODIUM LACTATE AND CALCIUM CHLORIDE 500 ML: 600; 310; 30; 20 INJECTION, SOLUTION INTRAVENOUS at 15:02

## 2019-08-13 RX ADMIN — SODIUM PHOSPHATE, MONOBASIC, MONOHYDRATE 20 MMOL: 276; 142 INJECTION, SOLUTION INTRAVENOUS at 12:01

## 2019-08-13 RX ADMIN — ACETYLCYSTEINE 2 ML: 200 SOLUTION ORAL; RESPIRATORY (INHALATION) at 13:59

## 2019-08-13 RX ADMIN — VANCOMYCIN HYDROCHLORIDE 1500 MG: 5 INJECTION, POWDER, LYOPHILIZED, FOR SOLUTION INTRAVENOUS at 20:22

## 2019-08-13 RX ADMIN — CARBAMAZEPINE 150 MG: 100 SUSPENSION ORAL at 20:19

## 2019-08-13 RX ADMIN — CIPROFLOXACIN 400 MG: 2 INJECTION, SOLUTION INTRAVENOUS at 08:30

## 2019-08-13 RX ADMIN — ACETYLCYSTEINE 2 ML: 200 SOLUTION ORAL; RESPIRATORY (INHALATION) at 19:34

## 2019-08-13 RX ADMIN — INSULIN ASPART 1 UNITS: 100 INJECTION, SOLUTION INTRAVENOUS; SUBCUTANEOUS at 15:22

## 2019-08-13 RX ADMIN — PIPERACILLIN SODIUM AND TAZOBACTAM SODIUM 4.5 G: 4; .5 INJECTION, POWDER, LYOPHILIZED, FOR SOLUTION INTRAVENOUS at 21:39

## 2019-08-13 RX ADMIN — BRIVARACETAM 100 MG: 10 SOLUTION ORAL at 20:32

## 2019-08-13 RX ADMIN — SODIUM CHLORIDE 1000 ML: 9 INJECTION, SOLUTION INTRAVENOUS at 11:27

## 2019-08-13 RX ADMIN — PIPERACILLIN SODIUM AND TAZOBACTAM SODIUM 4.5 G: 4; .5 INJECTION, POWDER, LYOPHILIZED, FOR SOLUTION INTRAVENOUS at 16:17

## 2019-08-13 ASSESSMENT — ACTIVITIES OF DAILY LIVING (ADL)
SWALLOWING: 2-->DIFFICULTY SWALLOWING LIQUIDS/FOODS
RETIRED_COMMUNICATION: 3-->UNABLE TO SPEAK (NOT RELATED TO LANGUAGE BARRIER)
COGNITION: 1 - ATTENTION OR MEMORY DEFICITS
ADLS_ACUITY_SCORE: 43
TOILETING: 4-->COMPLETELY DEPENDENT
ADLS_ACUITY_SCORE: 46
FALL_HISTORY_WITHIN_LAST_SIX_MONTHS: NO
ADLS_ACUITY_SCORE: 41
ADLS_ACUITY_SCORE: 42
DRESS: 4-->COMPLETELY DEPENDENT
ADLS_ACUITY_SCORE: 41
BATHING: 4-->COMPLETELY DEPENDENT
ADLS_ACUITY_SCORE: 42
RETIRED_EATING: 4-->COMPLETELY DEPENDENT

## 2019-08-13 NOTE — PLAN OF CARE
Pt alert, follows commands, non-verbal. On and off levo all day. On Vit C study. Received one 500cc bolus. No BMs. Lerma in place. Tube feed started. Mother at the bedside this afternoon. Will continue to monitor.

## 2019-08-13 NOTE — PROGRESS NOTES
Pt VSS remain stable on .09 levo. Afebrile. Pt alert and following commands, but nonverbal at baseline. On 2L NC. Open abrasion on buttocks, MD notified and WOC consulted. Pt autodiuresing, 2 large BMs this shift. Awaiting morning lab results. Will CTM and support. Juana Mao RN on 8/13/2019 at 6:44 AM

## 2019-08-13 NOTE — PROGRESS NOTES
Pt arrived to ICU bed 361 at 2000. VSS upon arrival on .06 of levo. Will CTM. Juana Mao RN on 8/12/2019 at 8:32 PM

## 2019-08-13 NOTE — PROGRESS NOTES
[facility] Nurse Inpatient Wound Assessment   Reason for consultation: Evaluate and treat Right buttock wound and gluteal cleft skin irritation     Assessment  Right buttock wound due to Pressure Injury and Incontinence Associated Dermatitis (IAD). 2 cm x 2 cm purple unstageble pressure injury noted to right buttock- area intact except for pinpoint open area to most proximal tip. Entire gluteal cleft had blanchable erythema/irritation related to IAD.   Status: initial assessment    Treatment Plan  Right buttock and gluteal cleft wound: Daily    1. Cleanse with soap and water or cleansing wipes  2. Label sacral Mepilex with T, date/time and initials  3. Apply melilex with point towards head- make sure to fold like a taco to ensure good fit into gluteal fold- do not block anus.  4. If sacral mepilex needs to be changed more than once daily change to the following orders instead:    Right buttock and gluteal cleft redness: twice daily and PRN- if mepilex not staying in place only!  1. Cleanse with Rita spray cleanser and robyn white disposable washcloths ([id number])  2. Apply light layer of Critic-aid paste to reddened areas   3. If paste becomes soiled, wipe clean but no need to totally remove paste and re-apply as needed  4. If you must remove critic-aid use baby oil    Orders Written  WO Nurse follow-up plan:weekly  Nursing to notify the Provider(s) and re-consult the WO Nurse if wound(s) deteriorates or new skin concern.    Patient History  According to provider note(s):  59M prior TBI now with chronic deficits including aphasia and cognitive deficits who now presented to the ED for fever and coughing.  History is limited by his chronic aphasia and his mother who provided history to the ED physician has since left, but per ED provider note and report pt has had recent fever and cough.  He also recent had his GJ tube replaced within the last week.   In the ED he was found to be hypotensive which was recalcitrant to IV  fluid resuscitation and had a lactate in the 3s.  A central line was placed and he was started on norepi.    Objective Data  Containment of urine/stool: Diaper and Indwelling catheter    Active Diet Order  Orders Placed This Encounter      NPO for Medical/Clinical Reasons Except for: No Exceptions      Output:   I/O last 3 completed shifts:  In: 1977.92 [I.V.:1977.92]  Out: 3175 [Urine:3175]    Risk Assessment:   Sensory Perception: 1-->completely limited  Moisture: 3-->occasionally moist  Activity: 1-->bedfast  Mobility: 2-->very limited  Nutrition: 2-->probably inadequate  Friction and Shear: 2-->potential problem  Ricci Score: 11                          Labs:   Recent Labs   Lab 08/13/19  0600 08/12/19  2200   HGB 11.6*  --    WBC 23.3*  --    A1C  --  6.1*       Physical Exam  Skin inspection: focused buttocks and coccyx    Wound Location:  Right buttock    Date of last photo 8/13/19  Wound History: wound POA   Measurements (length x width x depth, in cm) 2 cm x 2 cm  x  0.1 cm   Wound Base: 100 % purple   Tunneling N/A  Undermining N/A  Palpation of the wound bed: normal   Periwound skin: erythema- blanchable and IAD  Color: red  Temperature: normal   Drainage:, none  Description of drainage: none  Odor: none  Pain: during dressing change, tender    Interventions  Current support surface: Standard  Atmos Air mattress  Current off-loading measures: Pillows under calves and z flow pads  Visual inspection of wound(s) completed  Wound Care: done per plan of care  Supplies: reviewed, at bedside and discussed with RN  Education provided: importance of repositioning and plan of care  Discussed plan of care with Patient and Nurse    Julia Catherine RN

## 2019-08-13 NOTE — PHARMACY-VANCOMYCIN DOSING SERVICE
Pharmacy Vancomycin Initial Note  Date of Service 2019  Patient's  1960  59 year old, male    Indication: Sepsis    Current estimated CrCl = Estimated Creatinine Clearance: 67 mL/min (based on SCr of 1.25 mg/dL).    Creatinine for last 3 days  2019:  1:13 PM Creatinine 1.25 mg/dL    Recent Vancomycin Level(s) for last 3 days  No results found for requested labs within last 72 hours.      Vancomycin IV Administrations (past 72 hours)                   vancomycin 1500 mg in 0.9% NaCl 250 ml intermittent infusion 1,500 mg (mg) 1,500 mg Given 19 1918                Nephrotoxins and other renal medications (From now, onward)    Start     Dose/Rate Route Frequency Ordered Stop    19 0700  vancomycin 1500 mg in 0.9% NaCl 250 ml intermittent infusion 1,500 mg      1,500 mg  over 90 Minutes Intravenous ONCE 19 214  vancomycin place lundberg - receiving intermittent dosing      1 each Intravenous SEE ADMIN INSTRUCTIONS 19 2100  piperacillin-tazobactam (ZOSYN) 3.375 g vial to attach to  mL bag      3.375 g  over 30 Minutes Intravenous EVERY 6 HOURS 19 1733  norepinephrine (LEVOPHED) 16 mg in  mL infusion      0.03-0.4 mcg/kg/min × 72.6 kg  2-27.2 mL/hr  Intravenous CONTINUOUS 19 1732            Contrast Orders - past 72 hours (72h ago, onward)    None                Plan:  1.  Start vancomycin  1500 mg IV q12h.   2.  Goal Trough Level: 15-20 mg/L   3.  Pharmacy will check trough levels as appropriate in  after 2 doses..    4. Serum creatinine levels will be ordered daily for the first week of therapy and at least twice weekly for subsequent weeks.    5. Madison method utilized to dose vancomycin therapy: Method 1    Kristi Ayers

## 2019-08-13 NOTE — H&P
MICU History and Physical  [facility], [place]  Date of Admission: August 12, 2019  Chief Complaint: fever and cough     History of Present Illness:  59M prior TBI now with chronic deficits including aphasia and cognitive deficits who now presented to the ED for fever and coughing.  History is limited by his chronic aphasia and his mother who provided history to the ED physician has since left, but per ED provider note and report pt has had recent fever and cough.  He also recent had his GJ tube replaced within the last week.   In the ED he was found to be hypotensive which was recalcitrant to IV fluid resuscitation and had a lactate in the 3s.  A central line was placed and he was started on norepi.      Assessment & Plan:  Mr Farias 59 year old male now presented with shock presumed to be septic in setting of fever and cough.    NEURO/PSYCH  # Sedation: None needed  # Pain: Tylenol, oxycodone PRN   # Seizures: carbamazepine, brivaracetam      CARDIOVASCULAR  # Septic shock:  Requiring levophed following fluid bolus.  Trend lactic acids.  Adequately fluid resuscitated in ED.  Abx as below.  Stress dose steroids (on steroids at home).    PULMONARY  # CAP vs pneumonitis: Breath sounds normal and CXR with right lower lobe opacity (though this has appeared on prior xrays) and febrile, PNA possible source of fever. No pleural effusion. Venous gases within normal limits, satting well on room air. Consider CT if condition worsens.   Continue home Mucomyst nebulizer and Albuterol for chronic secretion management    GI  # Dysphagia 2/2 TBI, Nutrition: NPO. Feedings through GJ tube.   - consult nutrition   # GERD: PPI      RENAL  # Mild ALMAS 2/2 sepsis: Cr 1.25. Baseline 0.7-0.9 and hypovolemic on exam.   - continue following  - continue maintenance IVF  - follow I/Os    # Electrolytes: within normal limits   - AM BMP      HEME/ONC  Hgb 13.7. Stable. Baseline 12.     ENDOCRINE  # Hypothyroidism:  Continue levothyroxine   # Panhypopituitarism: Continue hydrocortisone (stress dose), deferring on testosterone for now  # Blood Sugars: sliding scale insulin     INFECTIOUS DISEASE  # Sepsis: Febrile at presentation with Tmax 103.3 --> 99.7. WBC 14.7. ANC 11.4. Elevated lactate 3.4. Likely pulmonary vs urinary source with infiltrates seen on CXR and bacteria on UA.   - C diff pending   # Antimicrobials:  Continue Vancomycin/Zosyn; also added cipro as pt has had beta-lactam resistant pseudomonas in urine in past.  # Cultures: Blood, sputum, and urine cultures pending.     SKIN/MSK  Small R buttock wound, present on admission:  WOC consult.    Prophylaxis:     - DVT: lovenox subcutaneous      - GI: PPI   Disposition: ICU   Code Status: Full     -----------------------------------------------------------------  Physical Exam   Blood pressure 92/59, pulse 98, temperature 99.7  F (37.6  C), temperature source Axillary, resp. rate (!) 33, weight 72.6 kg (160 lb), SpO2 91 %.    Ranges:   Temperatures:  Current - Temp: 99.7  F (37.6  C); Max - Temp  Av.5  F (38.6  C)  Min: 99.7  F (37.6  C)  Max: 103.3  F (39.6  C)  Respiration range: Resp  Av.7  Min: 14  Max: 38  Pulse oximetry range: SpO2  Av.9 %  Min: 91 %  Max: 99 %  Blood pressure range: Systolic (24hrs), Av , Min:71 , Max:114   ; Diastolic (24hrs), Av, Min:25, Max:84      No intake/output data recorded.  Wt Readings from Last 5 Encounters:   19 72.6 kg (160 lb)   19 72.6 kg (160 lb)   19 72.4 kg (159 lb 11.2 oz)   19 74.5 kg (164 lb 3.9 oz)   19 74.5 kg (164 lb 3.2 oz)       Resp: (!) 33    BP - Mean:  [55-73] 69    General Appearance: pleasant, appears happy (smiling)  HEENT: eyes equal and reactive to light  Respiratory: cta-b  Cardiovascular: RRR, no murmurs  GI: abdomen nontender, nondistended, BS+, soft  Genitourinary: deferred  Skin: no rashes   Extremities:   Pre-existing digital contractures noted. Warm  x4  Neurologic: awake, alert, at his baseline, appears happy but pt generally unable to make needs known at baseline, moving all 4    -----------------------------------------------------------------  Additional Patient History  Review of Systems   The 10 point Review of Systems is negative other than noted in the HPI or here.    Past Medical History    I have reviewed this patient's medical history and updated it with pertinent information if needed.   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  Past Surgical History:   Procedure Laterality Date     ENDOSCOPIC ULTRASOUND UPPER GASTROINTESTINAL TRACT (GI) N/A 1/30/2017    Procedure: ENDOSCOPIC ULTRASOUND, ESOPHAGOSCOPY / UPPER GASTROINTESTINAL TRACT (GI);  Surgeon: Jus Montana MD;  Location:  OR     ENDOSCOPIC ULTRASOUND, ESOPHAGOSCOPY, GASTROSCOPY, DUODENOSCOPY (EGD), NECROSECTOMY N/A 2/7/2017    Procedure: ENDOSCOPIC ULTRASOUND, ESOPHAGOSCOPY, GASTROSCOPY, DUODENOSCOPY (EGD), NECROSECTOMY;  Surgeon: Jack Marcus MD;  [facility]: [facility]      ESOPHAGOSCOPY, GASTROSCOPY, DUODENOSCOPY (EGD), COMBINED  3/13/2014    Procedure: COMBINED ESOPHAGOSCOPY, GASTROSCOPY, DUODENOSCOPY (EGD), BIOPSY SINGLE OR MULTIPLE;  gastroscopy;  Surgeon: Digna Rhodes MD;  Location:  GI     ESOPHAGOSCOPY, GASTROSCOPY, DUODENOSCOPY (EGD), COMBINED N/A 2016    Procedure: COMBINED ESOPHAGOSCOPY, GASTROSCOPY, DUODENOSCOPY (EGD);  Surgeon: Digna Rhodes MD;  Location:  GI     ESOPHAGOSCOPY, GASTROSCOPY, DUODENOSCOPY (EGD), COMBINED N/A 2017    Procedure: COMBINED ENDOSCOPIC ULTRASOUND, ESOPHAGOSCOPY, GASTROSCOPY, DUODENOSCOPY (EGD), FINE NEEDLE ASPIRATE/BIOPSY;  Surgeon: Too Thakur MD;  Location: [facility]     HEAD & NECK SURGERY      reconstructive facial surgery following accident in      IR FOLLOW UP VISIT INPATIENT  2019     IR GASTRO JEJUNOSTOMY TUBE CHANGE  2018     IR GASTRO JEJUNOSTOMY TUBE CHANGE  2019     IR GASTRO JEJUNOSTOMY TUBE CHANGE  3/8/2019     IR GASTRO JEJUNOSTOMY TUBE CHANGE  2019     LAPAROSCOPIC APPENDECTOMY  2013    Procedure: LAPAROSCOPIC APPENDECTOMY;  LAPAROSCOPIC APPENDECTOMY;  Surgeon: Manish Pierce MD;  Location:  OR     LAPAROSCOPIC ASSISTED INSERTION TUBE GASTROTOMY N/A 2016    Procedure: LAPAROSCOPIC ASSISTED INSERTION TUBE GASTROSTOMY;  Surgeon: Manish Pierce MD;  Location:  OR     ORTHOPEDIC SURGERY      right hand repair     TRACHEOSTOMY N/A 9/3/2016    Procedure: TRACHEOSTOMY;  Surgeon: João Ortiz MD;  Location:  OR     TRACHEOSTOMY N/A 2016    Procedure: TRACHEOSTOMY;  Surgeon: João Ortiz MD;  Location:  OR     VASCULAR SURGERY         Social History   Social History     Tobacco Use     Smoking status: Former Smoker     Last attempt to quit: 1989     Years since quittin.3     Smokeless tobacco: Never Used   Substance Use Topics     Alcohol use: No     Drug use: No       Family History   I have reviewed this  patient's family history and updated it with pertinent information if needed.   Family History   Problem Relation Age of Onset     Cancer Father        Prior to Admission Medications     No current facility-administered medications on file prior to encounter.   Current Outpatient Medications on File Prior to Encounter:  acetaminophen (TYLENOL) 500 MG tablet 1,000 mg by Oral or FT or NG tube route every 6 hours as needed for mild pain   acetylcysteine (MUCOMYST) 20 % neb solution Take 2 mLs by nebulization 4 times daily (Patient taking differently: Take 2 mLs by nebulization 4 times daily With Albuterol at 07:00, 11:00, 15:00, 19:00)   albuterol (PROVENTIL) (5 MG/ML) 0.5% neb solution Take 2.5 mg by nebulization 4 times daily 0700 1100 1500 1900 with mucomyst   aspirin (ASA) 81 MG chewable tablet 81 mg by Oral or Feeding Tube route daily At 0900   bacitracin ointment Apply topically daily as needed for wound care To PEG site.    Brivaracetam (BRIVIACT) 10 MG/ML solution 100 mg by Oral or FT or NG tube route 2 times daily @0900 and 2100   calcium carbonate 1250 (500 CA) MG/5ML SUSP suspension 5 mLs (1,250 mg) by Per J Tube route 3 times daily (with meals)   carBAMazepine (TEGRETOL) 100 MG/5ML suspension Take 150 mg by mouth every 6 hours At 06:00, 12:00, 18:00 and 24:00 for seizures   ferrous sulfate 220 (44 Fe) MG/5ML ELIX 220 mg by Per Feeding Tube route daily   glycopyrrolate (GLYCATE) 2 MG tablet Take 1-2 mg by mouth 2 times daily as needed (secretions) 1/2 to 1 tablet (1 - 2 mg) up to twice daily if needed for secretions.   Guar Gum (FIBER MODULAR, NUTRISOURCE FIBER,) packet 1 packet by Per G Tube route daily   hydrocortisone (CORTEF) 5 MG tablet 10 mg by Oral or FT or NG tube route daily (with dinner) At 1500   hydrocortisone (CORTEF) 5 MG tablet 20 mg by Oral or FT or NG tube route every morning    levothyroxine (SYNTHROID/LEVOTHROID) 150 MCG tablet 150 mcg by Per Feeding Tube route every morning Confirmed dose  with Georgina -   melatonin (MELATONIN) 1 MG/ML LIQD liquid 6 mg by Per NG tube route At Bedtime    Multiple Vitamins-Minerals (CENTRUM SILVER) per tablet Take 1 tablet by mouth daily Crush and feed via j-tube   mupirocin (BACTROBAN) 2 % external ointment Apply topically 2 times daily as needed    pantoprazole (PROTONIX) 2 mg/mL SUSP suspension 20 mLs (40 mg) by Per J Tube route daily   potassium & sodium phosphates (NEUTRA-PHOS) 280-160-250 MG Packet Take 1 packet by mouth 3 times daily 0900, 1500, 2100.    testosterone cypionate (DEPOTESTOTERONE CYPIONATE) 200 MG/ML injection Inject 76 mg into the muscle See Admin Instructions Every 2 weeks on  76 mg or 0.38 mL   vitamin D3 (CHOLECALCIFEROL) 2000 units (50 mcg) tablet Take 2,000 Units by mouth daily Crush and feed via j-tube @@ 0900   [] amoxicillin-clavulanate (AUGMENTIN) 875-125 MG tablet Take 1 tablet by mouth every 12 hours for 7 days   order for DME Equipment being ordered: Nebulizer       Allergies      Allergies   Allergen Reactions     Dilantin [Phenytoin Sodium]      Valproic Acid      Toxicity c bone marrow suspension, elevated ammonia levels        Pertinent Data:   DIAGNOSTIC STUDIES  ROUTINE ICU LABS  CMP  Recent Labs   Lab 19  1313      POTASSIUM 3.9   CHLORIDE 113*   CO2 26   ANIONGAP 5   *   BUN 25   CR 1.25   GFRESTIMATED 64   GFRESTBLACK 74   STEVE 8.0*     CBC  Recent Labs   Lab 19  1313   WBC 14.7*   RBC 4.75   HGB 13.7   HCT 41.9   MCV 88   MCH 28.8   MCHC 32.7   RDW 15.1*        INRNo lab results found in last 7 days.  Lactic Acid  Recent Labs   Lab 19  1530 19  1322   LACT 3.4* 3.0*     Urine Studies  Recent Labs   Lab Test 19  1405 19  1855 19  2232   LEUKEST Small* Moderate* Moderate*   NITRITE Negative Negative Positive*   WBCU 6* 25* 25*   RBCU 0 2 1     Arterial Blood GasNo lab results found in last 7 days.  Venous Blood Gas Recent Labs   Lab 19  1530  08/12/19  1322   PHV 7.37 7.35   PCO2V 38* 44   PO2V 32 20*   HCO3V 22 24       MICROBIOLOGY  Culture pending: blood, sputum, urine     IMAGING: CXR 8/12/19  IMPRESSION: Right pulmonary basilar hazy opacity which given elevation  of the right hemidiaphragm could represent atelectasis. Pneumonia is  not excluded. No apparent pleural effusion.    Graham Dumas MS 4    Physician Attestation   I, Adarsh Liriano, was present with the medical student who participated in the service and in the documentation of the note.  I have verified the history and personally performed the physical exam and medical decision making.  I agree with the assessment and plan of care as documented in the note.      I personally reviewed vital signs, medications, labs and imaging.    See my edits to the note above in blue.  Labs personally reviewed/interpreted (see a/p).  CXR personally reviewed/interpreted:  R lower field infiltration previously seen but more pronounced now.  Otherwise clear.  EKG unavailable here, but performed in ED:  Per ED interpretion nonischemic with normal qtc.    Critical care time 40 min    Adarsh Liriano MD  Date of Service (when I saw the patient): 08/12/19

## 2019-08-13 NOTE — CONSULTS
CLINICAL NUTRITION SERVICES  -  ASSESSMENT NOTE      RECOMMENDATIONS FOR MD/PROVIDER TO ORDER:   Recommend resume Neutraphos TID as at home    When IVF off, recommend increase H20 flushes to meet fluid needs, ie 240 mL 4x per day (960 mL) for total fluid 1860 mL (26 mL/kg)   Recommendations Ordered by Registered Dietitian (RD):   Begin TF now Isosource 1.5 (substitution for Jevity 1.5) at 100 mL/hr and run until 7 pm tonight.    Tomorrow, begin daytime TF cycle Isosource 1.5 at 100 mL/hr x 12 hrs (7 am - 7 pm) = 1800 kcals, 82 gm pro< 18 gm fiber, 912 mL H20, 211 gm CHO  Nutrisource Fiber 1 packet per day = 15 kcals, 3 gm fiber  Prosource 1 packet BID = 80 kcals, 22 gm pro  Total (TF + Nutrisource Fiber + Prosource):  1900 kcals (27 kcal/kg and 90% energy needs), 104 gm pro (1.5 gm/kg)  Free H20 60 mL every 4 hrs (while on IVF)  Certavite daily via FT - per PI Protocol   Future/Additional Recommendations:   If more kcals and protein desired, could consider lengthening daytime infusion time to 14 hrs (2100 kcals, 95 gm pro) for daily total 2200 kcals (31 kcal/kg), 117 gm pro (1.7 gm/kg)   Malnutrition:   % Weight Loss:  > 2% in 1 week (severe malnutrition)  % Intake:  No decreased intake noted  Subcutaneous Fat Loss:  None observed  Muscle Loss:  Temporal region mild depletion  Fluid Retention:  None noted    Malnutrition Diagnosis: Non-Severe malnutrition  In Context of:  Acute illness or injury  Chronic illness or disease        REASON FOR ASSESSMENT  Keyon Farias is a 59 year old male seen by Registered Dietitian for Provider Order - Registered Dietitian to Assess and Order TF per Medical Nutrition Therapy Protocol      NUTRITION HISTORY  - Information obtained from Epic records and lizbeth Nuñez.  - Tube feeding history:    He has been on TF via GJ tube since August 2016; last tube was placed 3/8/19 - 18Fr TORY GJ tube.  - Per previous records pt's home TF regimen is as follows - Jevity 1.5 (5 to 5.5 cans daily) x  "12 hours during the day to provide 2815-8067 kcal, 77-83g protein, ~260g CHO, ~27g fiber and ~900mL free water. H2O flushes 120 mL QID.  - Pt lives with his mother, she likes to keep the TF rate at no more than 100 ml/hr. He is fed during the day rather than at night so that he can be up in the chair to prevent aspiration\"    Today I spoke with patient's mom Stephen to verify above.  She stated the water flushes are more like 240 mL 4x per day.  They really try to get the 5.5 cans in as she'd like him to gain weight to at least 160 lbs.  He has 1 BM per day.    He receives Centrum Silver (MVI-M) daily crushed via J tube.    CURRENT NUTRITION ORDERS  Diet Order:     NPO     Current Intake/Tolerance:  NPO x 2 days.      NUTRITION FOCUSED PHYSICAL ASSESSMENT FOR DIAGNOSING MALNUTRITION)  Completed:  Yes Partial assessment          Observed:    Muscle wasting (refer to documentation in Malnutrition section)    Obtained from Chart/Interdisciplinary Team:  Chronically ill looking  Hemiplegia  Pressure Injury - Per WOCN today  - Right buttock wound due to Pressure Injury and Incontinence Associated Dermatitis (IAD).  - 2 cm x 2 cm purple Unstageble pressure injury noted to right buttock- area intact except for pinpoint open area to most proximal tip.   - Entire gluteal cleft had blanchable erythema/irritation related to IAD.     ANTHROPOMETRICS  Height: 5 ft 11 in  Weight:  70.4 kg (155 lbs 3.26 oz)  Body mass index is 21.65 kg/m .  Weight Status:  Normal BMI  IBW:  78.2 kg  % IBW:  90%  Weight History:    - Weight varies mostly between 155-164 lbs as below.   - Pt is currently at lower end of usual weight range.  - Mom would like him to be mid-range.  - As below, weight loss 4.1 kg (6%) over the past 3 months and 2.2 kg (3%) over the past week      Wt Readings from Last 20 Encounters:   08/13/19 70.4 kg (155 lb 3.3 oz)   08/07/19 72.6 kg (160 lb)   06/19/19 72.4 kg (159 lb 11.2 oz)   05/30/19 74.5 kg (164 lb 3.9 oz) "   05/08/19 74.5 kg (164 lb 3.2 oz)   04/08/19 71.8 kg (158 lb 6.4 oz)   04/02/19 72 kg (158 lb 11.7 oz)   03/18/19 70.9 kg (156 lb 4.9 oz)   03/11/19 77.6 kg (171 lb)   03/04/19 72.6 kg (160 lb)   02/22/19 74.4 kg (164 lb 0.4 oz)   02/01/19 71.5 kg (157 lb 11.2 oz)   01/16/19 71.6 kg (157 lb 13.6 oz)   12/24/18 62 kg (136 lb 11 oz)   11/25/18 71.7 kg (158 lb 1.1 oz)   11/04/18 71.5 kg (157 lb 10.1 oz)   09/22/18 70.3 kg (155 lb)   09/21/18 78 kg (171 lb 15.3 oz)   09/03/18 72.3 kg (159 lb 6.3 oz)   07/07/18 74.7 kg (164 lb 10.9 oz)       LABS  Labs reviewed  Na 156 (H)  Phos 1.4 (L) --> got IV bump - Note pt receives Neutraphos 3 packets per day at home    MEDICATIONS  Medications reviewed  NaCl at 125 mL/hr - for fluid delivery      ASSESSED NUTRITION NEEDS PER APPROVED PRACTICE GUIDELINES:    Dosing Weight:  70.4 kg   Estimated Energy Needs: 2747-4382 kcals (30-35 Kcal/Kg)  Justification: repletion and wound  Estimated Protein Needs:  104-127 grams protein (1.5-1.8 g pro/Kg)  Justification: Repletion, wound healing and hypercatabolism with acute illness  Estimated Fluid Needs:  9917-1844 mL (1 mL/Kcal)  Justification: maintenance    MALNUTRITION:  % Weight Loss:  > 2% in 1 week (severe malnutrition)  % Intake:  No decreased intake noted  Subcutaneous Fat Loss:  None observed  Muscle Loss:  Temporal region mild depletion  Fluid Retention:  None noted    Malnutrition Diagnosis: Non-Severe malnutrition  In Context of:  Acute illness or injury  Chronic illness or disease    NUTRITION DIAGNOSIS:  Inadequate enteral nutrition infusion related to TF on hold as evidenced by meeting 0% estimated needs and plan to resume TF today      NUTRITION INTERVENTIONS  Recommendations / Nutrition Prescription  Begin TF now Isosource 1.5 (substitution for Jevity 1.5) at 100 mL/hr and run until 7 pm tonight.    Tomorrow, begin daytime TF cycle Isosource 1.5 at 100 mL/hr x 12 hrs (7 am - 7 pm) = 1800 kcals, 82 gm pro< 18 gm fiber, 912  mL H20, 211 gm CHO  Nutrisource Fiber 1 packet per day = 15 kcals, 3 gm fiber  Prosource 1 packet BID = 80 kcals, 22 gm pro  Total (TF + Nutrisource Fiber + Prosource):  1900 kcals (27 kcal/kg and 90% energy needs), 104 gm pro (1.5 gm/kg)  Free H20 60 mL every 4 hrs (while on IVF)  Certavite daily via FT - per PI Protocol    When IVF off, recommend increase H20 flushes to meet fluid needs, ie 240 mL 4x per day (960 mL) for total fluid 1860 mL (26 mL/kg)    Recommend resume Neutraphos TID as at home    If more kcals and protein desired, could consider lengthening daytime infusion time to 14 hrs (2100 kcals, 95 gm pro) for daily total 2200 kcals (31 kcal/kg), 117 gm pro (1.7 gm/kg)    Implementation  Nutrition education: Provided education to patient's mom on plan for daytime TF resumption  Collaboration and Referral of Nutrition care - Pt was discussed during ICU interdisciplinary rounds this morning  EN Composition, EN Schedule - TF orders entered in Epic as above  Feeding Tube Flush - Ordered std flushes for now  Medical Food Supplement - Ordered fiber module and protein module as above  Multivitamin/Mineral - Ordered Certavite daily as above    Nutrition Goals  TF + Nutrisource Fiber + Prosource will meet % estimated needs      MONITORING AND EVALUATION:  Progress towards goals will be monitored and evaluated per protocol and Practice Guidelines    Lisa Garcia, RD, LD, CNSC

## 2019-08-13 NOTE — PHARMACY-VANCOMYCIN DOSING SERVICE
Pharmacy Vancomycin Note  Date of Service 2019  Patient's  1960   59 year old, male    Indication: Sepsis  Goal Trough Level: 15-20 mg/L  Day of Therapy: 2  Current Vancomycin regimen:  Intermittent dosing, but has received 1.5g q12h X 2 doses    Current estimated CrCl = Estimated Creatinine Clearance: 84.5 mL/min (based on SCr of 0.96 mg/dL).    Creatinine for last 3 days  2019:  1:13 PM Creatinine 1.25 mg/dL; 10:00 PM Creatinine 1.08 mg/dL  2019:  6:00 AM Creatinine 0.96 mg/dL    Recent Vancomycin Levels (past 3 days)  2019:  5:50 PM Vancomycin Level 15.6 mg/L    Vancomycin IV Administrations (past 72 hours)                   vancomycin 1500 mg in 0.9% NaCl 250 ml intermittent infusion 1,500 mg (mg) 1,500 mg Given 19 0608    vancomycin 1500 mg in 0.9% NaCl 250 ml intermittent infusion 1,500 mg (mg) 1,500 mg Given 19 1918                Nephrotoxins and other renal medications (From now, onward)    Start     Dose/Rate Route Frequency Ordered Stop    19 1900  vancomycin 1500 mg in 0.9% NaCl 250 ml intermittent infusion 1,500 mg      1,500 mg  over 90 Minutes Intravenous EVERY 12 HOURS 19 1851      19 0900  piperacillin-tazobactam (ZOSYN) 4.5 g vial to attach to  mL bag      4.5 g  over 30 Minutes Intravenous EVERY 6 HOURS 19 0830      19 2140  vancomycin place lundberg - receiving intermittent dosing      1 each Intravenous SEE ADMIN INSTRUCTIONS 19 1733  norepinephrine (LEVOPHED) 16 mg in  mL infusion      0.03-0.4 mcg/kg/min × 72.6 kg  2-27.2 mL/hr  Intravenous CONTINUOUS 19 173               Contrast Orders - past 72 hours (72h ago, onward)    None          Interpretation of levels and current regimen:  Trough level is  Therapeutic    Has serum creatinine changed > 50% in last 72 hours: No    Urine output:  good urine output    Renal Function: Stable    Plan:  1.  Continue Current Dose of 1.5g  q12h  2.  Pharmacy will check trough levels as appropriate in 1-3 Days.    3. Serum creatinine levels will be ordered daily for the first week of therapy and at least twice weekly for subsequent weeks.      Elen Walter        .

## 2019-08-14 ENCOUNTER — APPOINTMENT (OUTPATIENT)
Dept: GENERAL RADIOLOGY | Facility: CLINIC | Age: 59
DRG: 853 | End: 2019-08-14
Attending: ANESTHESIOLOGY
Payer: MEDICARE

## 2019-08-14 ENCOUNTER — HOME INFUSION (PRE-WILLOW HOME INFUSION) (OUTPATIENT)
Dept: PHARMACY | Facility: CLINIC | Age: 59
End: 2019-08-14

## 2019-08-14 LAB
ANION GAP SERPL CALCULATED.3IONS-SCNC: 6 MMOL/L (ref 3–14)
BUN SERPL-MCNC: 12 MG/DL (ref 7–30)
CALCIUM SERPL-MCNC: 7.2 MG/DL (ref 8.5–10.1)
CHLORIDE SERPL-SCNC: 125 MMOL/L (ref 94–109)
CO2 SERPL-SCNC: 25 MMOL/L (ref 20–32)
CREAT SERPL-MCNC: 0.79 MG/DL (ref 0.66–1.25)
ERYTHROCYTE [DISTWIDTH] IN BLOOD BY AUTOMATED COUNT: 15.6 % (ref 10–15)
GFR SERPL CREATININE-BSD FRML MDRD: >90 ML/MIN/{1.73_M2}
GLUCOSE BLDC GLUCOMTR-MCNC: 128 MG/DL (ref 70–99)
GLUCOSE BLDC GLUCOMTR-MCNC: 143 MG/DL (ref 70–99)
GLUCOSE BLDC GLUCOMTR-MCNC: 158 MG/DL (ref 70–99)
GLUCOSE BLDC GLUCOMTR-MCNC: 161 MG/DL (ref 70–99)
GLUCOSE BLDC GLUCOMTR-MCNC: 172 MG/DL (ref 70–99)
GLUCOSE BLDC GLUCOMTR-MCNC: 188 MG/DL (ref 70–99)
GLUCOSE SERPL-MCNC: 160 MG/DL (ref 70–99)
HCT VFR BLD AUTO: 31.3 % (ref 40–53)
HGB BLD-MCNC: 10.1 G/DL (ref 13.3–17.7)
LACTATE BLD-SCNC: 2.4 MMOL/L (ref 0.7–2)
MAGNESIUM SERPL-MCNC: 1.9 MG/DL (ref 1.6–2.3)
MAGNESIUM SERPL-MCNC: 1.9 MG/DL (ref 1.6–2.3)
MCH RBC QN AUTO: 28.8 PG (ref 26.5–33)
MCHC RBC AUTO-ENTMCNC: 32.3 G/DL (ref 31.5–36.5)
MCV RBC AUTO: 89 FL (ref 78–100)
PHOSPHATE SERPL-MCNC: 2.1 MG/DL (ref 2.5–4.5)
PHOSPHATE SERPL-MCNC: 2.4 MG/DL (ref 2.5–4.5)
PLATELET # BLD AUTO: 117 10E9/L (ref 150–450)
POTASSIUM SERPL-SCNC: 3 MMOL/L (ref 3.4–5.3)
POTASSIUM SERPL-SCNC: 3.2 MMOL/L (ref 3.4–5.3)
POTASSIUM SERPL-SCNC: 3.5 MMOL/L (ref 3.4–5.3)
RBC # BLD AUTO: 3.51 10E12/L (ref 4.4–5.9)
SODIUM SERPL-SCNC: 156 MMOL/L (ref 133–144)
WBC # BLD AUTO: 9.7 10E9/L (ref 4–11)

## 2019-08-14 PROCEDURE — 25000132 ZZH RX MED GY IP 250 OP 250 PS 637: Mod: GY | Performed by: INTERNAL MEDICINE

## 2019-08-14 PROCEDURE — 25000128 H RX IP 250 OP 636: Performed by: INTERNAL MEDICINE

## 2019-08-14 PROCEDURE — 83735 ASSAY OF MAGNESIUM: CPT | Performed by: INTERNAL MEDICINE

## 2019-08-14 PROCEDURE — 84100 ASSAY OF PHOSPHORUS: CPT | Performed by: INTERNAL MEDICINE

## 2019-08-14 PROCEDURE — 25000125 ZZHC RX 250: Performed by: STUDENT IN AN ORGANIZED HEALTH CARE EDUCATION/TRAINING PROGRAM

## 2019-08-14 PROCEDURE — 25000125 ZZHC RX 250: Performed by: INTERNAL MEDICINE

## 2019-08-14 PROCEDURE — 94640 AIRWAY INHALATION TREATMENT: CPT

## 2019-08-14 PROCEDURE — 85027 COMPLETE CBC AUTOMATED: CPT | Performed by: INTERNAL MEDICINE

## 2019-08-14 PROCEDURE — 25000125 ZZHC RX 250: Performed by: ANESTHESIOLOGY

## 2019-08-14 PROCEDURE — 80048 BASIC METABOLIC PNL TOTAL CA: CPT | Performed by: INTERNAL MEDICINE

## 2019-08-14 PROCEDURE — 40000275 ZZH STATISTIC RCP TIME EA 10 MIN

## 2019-08-14 PROCEDURE — 25800030 ZZH RX IP 258 OP 636: Performed by: STUDENT IN AN ORGANIZED HEALTH CARE EDUCATION/TRAINING PROGRAM

## 2019-08-14 PROCEDURE — 27210429 ZZH NUTRITION PRODUCT INTERMEDIATE LITER

## 2019-08-14 PROCEDURE — 25800029 ZZH RX IP 258 OP 250: Performed by: ANESTHESIOLOGY

## 2019-08-14 PROCEDURE — 40000986 XR CHEST PORT 1 VW

## 2019-08-14 PROCEDURE — 25800030 ZZH RX IP 258 OP 636: Performed by: INTERNAL MEDICINE

## 2019-08-14 PROCEDURE — 94640 AIRWAY INHALATION TREATMENT: CPT | Mod: 76

## 2019-08-14 PROCEDURE — 20000003 ZZH R&B ICU

## 2019-08-14 PROCEDURE — 25000132 ZZH RX MED GY IP 250 OP 250 PS 637: Mod: GY | Performed by: STUDENT IN AN ORGANIZED HEALTH CARE EDUCATION/TRAINING PROGRAM

## 2019-08-14 PROCEDURE — 99232 SBSQ HOSP IP/OBS MODERATE 35: CPT | Performed by: ANESTHESIOLOGY

## 2019-08-14 PROCEDURE — 83605 ASSAY OF LACTIC ACID: CPT | Performed by: INTERNAL MEDICINE

## 2019-08-14 PROCEDURE — 00000146 ZZHCL STATISTIC GLUCOSE BY METER IP

## 2019-08-14 PROCEDURE — 27210219 ZZH KIT SHRLOCK 5FR DBL LUM PWR P

## 2019-08-14 PROCEDURE — 84132 ASSAY OF SERUM POTASSIUM: CPT | Performed by: INTERNAL MEDICINE

## 2019-08-14 RX ORDER — HEPARIN SODIUM,PORCINE 10 UNIT/ML
2-5 VIAL (ML) INTRAVENOUS
Status: DISCONTINUED | OUTPATIENT
Start: 2019-08-14 | End: 2019-08-16 | Stop reason: HOSPADM

## 2019-08-14 RX ORDER — CIPROFLOXACIN 500 MG/5ML
500 KIT ORAL 2 TIMES DAILY
Qty: 70 ML | Refills: 0 | Status: ON HOLD | OUTPATIENT
Start: 2019-08-14 | End: 2019-09-14

## 2019-08-14 RX ORDER — ONDANSETRON 2 MG/ML
4 INJECTION INTRAMUSCULAR; INTRAVENOUS ONCE
Status: COMPLETED | OUTPATIENT
Start: 2019-08-14 | End: 2019-08-14

## 2019-08-14 RX ORDER — PIPERACILLIN SODIUM, TAZOBACTAM SODIUM 4; .5 G/20ML; G/20ML
4.5 INJECTION, POWDER, LYOPHILIZED, FOR SOLUTION INTRAVENOUS EVERY 6 HOURS
Qty: 560 ML | Refills: 0 | Status: SHIPPED | OUTPATIENT
Start: 2019-08-14 | End: 2019-08-16

## 2019-08-14 RX ORDER — LIDOCAINE 40 MG/G
CREAM TOPICAL
Status: DISCONTINUED | OUTPATIENT
Start: 2019-08-14 | End: 2019-08-16 | Stop reason: HOSPADM

## 2019-08-14 RX ADMIN — POTASSIUM CHLORIDE 20 MEQ: 1.5 POWDER, FOR SOLUTION ORAL at 08:30

## 2019-08-14 RX ADMIN — PIPERACILLIN SODIUM AND TAZOBACTAM SODIUM 4.5 G: 4; .5 INJECTION, POWDER, LYOPHILIZED, FOR SOLUTION INTRAVENOUS at 09:59

## 2019-08-14 RX ADMIN — MULTIVITAMIN 15 ML: LIQUID ORAL at 08:30

## 2019-08-14 RX ADMIN — Medication 1 PACKET: at 12:31

## 2019-08-14 RX ADMIN — PIPERACILLIN SODIUM AND TAZOBACTAM SODIUM 4.5 G: 4; .5 INJECTION, POWDER, LYOPHILIZED, FOR SOLUTION INTRAVENOUS at 15:10

## 2019-08-14 RX ADMIN — ALBUTEROL SULFATE 2.5 MG: 2.5 SOLUTION RESPIRATORY (INHALATION) at 15:15

## 2019-08-14 RX ADMIN — Medication 1 PACKET: at 20:42

## 2019-08-14 RX ADMIN — INSULIN ASPART 1 UNITS: 100 INJECTION, SOLUTION INTRAVENOUS; SUBCUTANEOUS at 04:16

## 2019-08-14 RX ADMIN — BRIVARACETAM 100 MG: 10 SOLUTION ORAL at 20:41

## 2019-08-14 RX ADMIN — VANCOMYCIN HYDROCHLORIDE 1500 MG: 5 INJECTION, POWDER, LYOPHILIZED, FOR SOLUTION INTRAVENOUS at 07:54

## 2019-08-14 RX ADMIN — CIPROFLOXACIN 400 MG: 2 INJECTION, SOLUTION INTRAVENOUS at 21:58

## 2019-08-14 RX ADMIN — INSULIN ASPART 1 UNITS: 100 INJECTION, SOLUTION INTRAVENOUS; SUBCUTANEOUS at 20:55

## 2019-08-14 RX ADMIN — Medication 40 MG: at 07:58

## 2019-08-14 RX ADMIN — LIDOCAINE HYDROCHLORIDE 1 ML: 10 INJECTION, SOLUTION INFILTRATION; PERINEURAL at 14:10

## 2019-08-14 RX ADMIN — HYDROCORTISONE SODIUM SUCCINATE 50 MG: 100 INJECTION, POWDER, FOR SOLUTION INTRAMUSCULAR; INTRAVENOUS at 00:32

## 2019-08-14 RX ADMIN — ENOXAPARIN SODIUM 40 MG: 40 INJECTION SUBCUTANEOUS at 20:41

## 2019-08-14 RX ADMIN — POTASSIUM CHLORIDE 40 MEQ: 1.5 POWDER, FOR SOLUTION ORAL at 05:28

## 2019-08-14 RX ADMIN — POTASSIUM & SODIUM PHOSPHATES POWDER PACK 280-160-250 MG 1 PACKET: 280-160-250 PACK at 16:33

## 2019-08-14 RX ADMIN — CARBAMAZEPINE 150 MG: 100 SUSPENSION ORAL at 12:31

## 2019-08-14 RX ADMIN — PIPERACILLIN SODIUM AND TAZOBACTAM SODIUM 4.5 G: 4; .5 INJECTION, POWDER, LYOPHILIZED, FOR SOLUTION INTRAVENOUS at 21:15

## 2019-08-14 RX ADMIN — SODIUM CHLORIDE: 4.5 INJECTION, SOLUTION INTRAVENOUS at 12:08

## 2019-08-14 RX ADMIN — Medication 1 PACKET: at 08:30

## 2019-08-14 RX ADMIN — ALBUTEROL SULFATE 2.5 MG: 2.5 SOLUTION RESPIRATORY (INHALATION) at 07:48

## 2019-08-14 RX ADMIN — INSULIN ASPART 1 UNITS: 100 INJECTION, SOLUTION INTRAVENOUS; SUBCUTANEOUS at 16:33

## 2019-08-14 RX ADMIN — INSULIN ASPART 1 UNITS: 100 INJECTION, SOLUTION INTRAVENOUS; SUBCUTANEOUS at 07:57

## 2019-08-14 RX ADMIN — LEVOTHYROXINE SODIUM 150 MCG: 150 TABLET ORAL at 05:28

## 2019-08-14 RX ADMIN — SODIUM PHOSPHATE, MONOBASIC, MONOHYDRATE 15 MMOL: 276; 142 INJECTION, SOLUTION INTRAVENOUS at 00:36

## 2019-08-14 RX ADMIN — HYDROCORTISONE SODIUM SUCCINATE 50 MG: 100 INJECTION, POWDER, FOR SOLUTION INTRAMUSCULAR; INTRAVENOUS at 05:28

## 2019-08-14 RX ADMIN — CIPROFLOXACIN 400 MG: 2 INJECTION, SOLUTION INTRAVENOUS at 08:38

## 2019-08-14 RX ADMIN — POTASSIUM & SODIUM PHOSPHATES POWDER PACK 280-160-250 MG 1 PACKET: 280-160-250 PACK at 22:16

## 2019-08-14 RX ADMIN — CARBAMAZEPINE 150 MG: 100 SUSPENSION ORAL at 20:41

## 2019-08-14 RX ADMIN — INSULIN ASPART 1 UNITS: 100 INJECTION, SOLUTION INTRAVENOUS; SUBCUTANEOUS at 12:10

## 2019-08-14 RX ADMIN — POTASSIUM & SODIUM PHOSPHATES POWDER PACK 280-160-250 MG 1 PACKET: 280-160-250 PACK at 08:30

## 2019-08-14 RX ADMIN — ACETYLCYSTEINE 2 ML: 200 SOLUTION ORAL; RESPIRATORY (INHALATION) at 07:49

## 2019-08-14 RX ADMIN — ONDANSETRON 4 MG: 2 INJECTION INTRAMUSCULAR; INTRAVENOUS at 20:03

## 2019-08-14 RX ADMIN — HYDROCORTISONE SODIUM SUCCINATE 50 MG: 100 INJECTION, POWDER, FOR SOLUTION INTRAMUSCULAR; INTRAVENOUS at 16:33

## 2019-08-14 RX ADMIN — BRIVARACETAM 100 MG: 10 SOLUTION ORAL at 08:41

## 2019-08-14 RX ADMIN — VANCOMYCIN HYDROCHLORIDE 1500 MG: 5 INJECTION, POWDER, LYOPHILIZED, FOR SOLUTION INTRAVENOUS at 18:58

## 2019-08-14 RX ADMIN — ACETYLCYSTEINE 2 ML: 200 SOLUTION ORAL; RESPIRATORY (INHALATION) at 15:15

## 2019-08-14 RX ADMIN — HYDROCORTISONE SODIUM SUCCINATE 50 MG: 100 INJECTION, POWDER, FOR SOLUTION INTRAMUSCULAR; INTRAVENOUS at 10:43

## 2019-08-14 RX ADMIN — HYDROCORTISONE SODIUM SUCCINATE 50 MG: 100 INJECTION, POWDER, FOR SOLUTION INTRAMUSCULAR; INTRAVENOUS at 22:16

## 2019-08-14 RX ADMIN — ASPIRIN 81 MG 81 MG: 81 TABLET ORAL at 08:30

## 2019-08-14 RX ADMIN — CARBAMAZEPINE 150 MG: 100 SUSPENSION ORAL at 06:28

## 2019-08-14 RX ADMIN — CARBAMAZEPINE 150 MG: 100 SUSPENSION ORAL at 00:33

## 2019-08-14 RX ADMIN — ONDANSETRON 4 MG: 2 INJECTION INTRAMUSCULAR; INTRAVENOUS at 18:11

## 2019-08-14 RX ADMIN — PIPERACILLIN SODIUM AND TAZOBACTAM SODIUM 4.5 G: 4; .5 INJECTION, POWDER, LYOPHILIZED, FOR SOLUTION INTRAVENOUS at 04:00

## 2019-08-14 RX ADMIN — SODIUM PHOSPHATE, MONOBASIC, MONOHYDRATE 15 MMOL: 276; 142 INJECTION, SOLUTION INTRAVENOUS at 06:17

## 2019-08-14 RX ADMIN — POTASSIUM CHLORIDE 40 MEQ: 1.5 POWDER, FOR SOLUTION ORAL at 18:05

## 2019-08-14 RX ADMIN — SODIUM PHOSPHATE, MONOBASIC, MONOHYDRATE 15 MMOL: 276; 142 INJECTION, SOLUTION INTRAVENOUS at 18:12

## 2019-08-14 ASSESSMENT — ACTIVITIES OF DAILY LIVING (ADL)
ADLS_ACUITY_SCORE: 42
ADLS_ACUITY_SCORE: 43
ADLS_ACUITY_SCORE: 42
ADLS_ACUITY_SCORE: 43
ADLS_ACUITY_SCORE: 39
ADLS_ACUITY_SCORE: 42

## 2019-08-14 NOTE — PLAN OF CARE
Intensive Care Unit   Nursing Note            Neuro:  PERRL.  All extremities affected from previous TBI.  Follows commands as able.  Cardiovascular:  RRR.  Hemodynamically stable.  Remains off pressors.  Pulmonary:  Tolerating room air.  Oxygen saturation > 92  GI/:  TF off overnight per order with free water flushes continuing.  TF resumed at 0700.  Endocrine: Glucose well controlled.  Skin:  Intact except incisional areas; coccyx with mepilex..  Restraints:  Not in use  AM labs noted; electrolytes replaced as indicated.  Continue to monitor closely.      ROUTINE IP LABS (Last four results)  BMP  Recent Labs   Lab 08/14/19  0407 08/13/19  1750 08/13/19  0600 08/12/19  2200 08/12/19  1313   * 156* 156*  --  144   POTASSIUM 3.0*  --  3.4  --  3.9   CHLORIDE 125*  --  127*  --  113*   STEVE 7.2*  --  7.7*  --  8.0*   CO2 25  --  22  --  26   BUN 12  --  14  --  25   CR 0.79  --  0.96 1.08 1.25   *  --  147*  --  167*     CBC  Recent Labs   Lab 08/14/19  0407 08/13/19  0600 08/12/19  2200 08/12/19  1313   WBC 9.7 23.3*  --  14.7*   RBC 3.51* 4.02*  --  4.75   HGB 10.1* 11.6*  --  13.7   HCT 31.3* 35.6*  --  41.9   MCV 89 89  --  88   MCH 28.8 28.9  --  28.8   MCHC 32.3 32.6  --  32.7   RDW 15.6* 15.2*  --  15.1*   * 186 186 212       LACTIC ACID  Lactic Acid (mmol/L)   Date Value   08/14/2019 2.4 (H)   08/13/2019 3.8 (H)   08/13/2019 3.2 (H)   08/13/2019 2.8 (H)     Blood Glucose  Glucose (mg/dL)   Date Value   08/14/2019 143 (H)   08/14/2019 128 (H)   08/13/2019 238 (H)   08/13/2019 140 (H)   08/13/2019 111 (H)   08/13/2019 147 (H)       Intake/Output Summary (Last 24 hours) at 8/14/2019 0738  Last data filed at 8/14/2019 0700  Gross per 24 hour   Intake 6004.75 ml   Output 2800 ml   Net 3204.75 ml       Yoni Pierre MSN RN APRN PHN CCRN FNP-C  [facility]  Intensive Care Unit

## 2019-08-14 NOTE — DISCHARGE SUMMARY
[facility]  Discharge Summary      Date of Admission: 8/12/2019  Date of Discharge:  8/14/2019  Attending Physician: Demarco Ash  Discharging Provider:Demarco Ash  Date of Service (when I saw the patient): 08/14/19    Primary Provider: Carlos Gomez  Primary Care clinic: [address] [address]  Phone: [phone]  Fax number: [phone]     Discharge Diagnoses   Patient Active Problem List   Diagnosis     Appendicitis     Panhypopituitarism (H)     Hematemesis     Seizure (H)     Seizures (H)     Recurrent seizures (H)     Sepsis (H)     Septic shock (H)     Pneumonia of both lower lobes due to infectious organism (H)     Community acquired pneumonia     Breakthrough seizure (H)     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  History is limited by his chronic aphasia and his mother who provided history to the ED physician has since left, but per ED provider note and report pt has had recent fever and cough.  He also recent had his GJ tube replaced within the last week.   In the ED he was found to be hypotensive which was recalcitrant to IV fluid resuscitation and had a lactate in the 3s.  A central line was placed and he was started on norepi.  He was treated for septic shock likely due to UTI and is currently off pressors. He will continue antibiotics for total 7 days    Significant Results and Procedures   Pending Results     Unresulted Labs Ordered in the  Past 30 Days of this Admission     Date and Time Order Name Status Description    8/12/2019 2300 Referral sensitivity Preliminary     8/12/2019 1321 Blood culture Preliminary     8/12/2019 1321 Blood culture Preliminary         Code Status   Full Code    SUBJECTIVE: Patient is awake alert and cooperative.    Physical Exam   Temp: 97.7  F (36.5  C) Temp src: Axillary BP: 122/70 Pulse: 98 Heart Rate: 102 Resp: 16 SpO2: 95 % O2 Device: None (Room air)    Vitals:    08/12/19 1315 08/13/19 0253 08/14/19 0400   Weight: 72.6 kg (160 lb) 70.4 kg (155 lb 3.3 oz) 72.6 kg (160 lb 0.9 oz)     Vital Signs with Ranges  Temp:  [97.3  F (36.3  C)-98.3  F (36.8  C)] 97.7  F (36.5  C)  Pulse:  [] 98  Heart Rate:  [] 102  Resp:  [8-30] 16  BP: ()/(42-83) 122/70  SpO2:  [74 %-100 %] 95 %  I/O last 3 completed shifts:  In: 5766.75 [I.V.:4306.75; NG/GT:660; IV Piggyback:500]  Out: 2800 [Urine:2800]    Constitutional: awake and alert  Respiratory: Clear to auscultation  Cardiovascular: SiS2 heard no murmurs  GI: soft non tender  Neurologic: Alert and cooperative  Neuropsychiatric: congitive deficits stable    Discharge Disposition   Discharged to home  Condition at discharge: Stable  Discharge VS: Blood pressure 122/70, pulse 98, temperature 97.7  F (36.5  C), temperature source Axillary, resp. rate 16, weight 72.6 kg (160 lb 0.9 oz), SpO2 95 %.    Consultations This Hospital Stay   PHARMACY TO DOSE VANCO  PHARMACY TO DOSE VANCO  NUTRITION SERVICES ADULT IP CONSULT  WOUND OSTOMY CONTINENCE NURSE  IP CONSULT  PHARMACY IP CONSULT  VASCULAR ACCESS ADULT IP CONSULT    Discharge Orders   No discharge procedures on file.  Discharge Medications   Current Discharge Medication List      CONTINUE these medications which have NOT CHANGED    Details   acetaminophen (TYLENOL) 500 MG tablet 1,000 mg by Oral or FT or NG tube route every 6 hours as needed for mild pain      acetylcysteine (MUCOMYST) 20 % neb solution Take 2 mLs by  nebulization 4 times daily  Qty: 300 vial, Refills: 0    Associated Diagnoses: Pneumonia of both lower lobes due to infectious organism (H)      albuterol (PROVENTIL) (5 MG/ML) 0.5% neb solution Take 2.5 mg by nebulization 4 times daily 0700 1100 1500 1900 with mucomyst      aspirin (ASA) 81 MG chewable tablet 81 mg by Oral or Feeding Tube route daily At 0900      bacitracin ointment Apply topically daily as needed for wound care To PEG site.       Brivaracetam (BRIVIACT) 10 MG/ML solution 100 mg by Oral or FT or NG tube route 2 times daily @0900 and 2100      calcium carbonate 1250 (500 CA) MG/5ML SUSP suspension 5 mLs (1,250 mg) by Per J Tube route 3 times daily (with meals)  Qty: 450 mL    Associated Diagnoses: Malnutrition (H)      carBAMazepine (TEGRETOL) 100 MG/5ML suspension Take 150 mg by mouth every 6 hours At 06:00, 12:00, 18:00 and 24:00 for seizures      ferrous sulfate 220 (44 Fe) MG/5ML ELIX 220 mg by Per Feeding Tube route daily      glycopyrrolate (GLYCATE) 2 MG tablet Take 1-2 mg by mouth 2 times daily as needed (secretions) 1/2 to 1 tablet (1 - 2 mg) up to twice daily if needed for secretions.      Guar Gum (FIBER MODULAR, NUTRISOURCE FIBER,) packet 1 packet by Per G Tube route daily  Qty: 30 packet, Refills: 0    Associated Diagnoses: Pneumonia of both lower lobes due to infectious organism (H)      !! hydrocortisone (CORTEF) 5 MG tablet 10 mg by Oral or FT or NG tube route daily (with dinner) At 1500      !! hydrocortisone (CORTEF) 5 MG tablet 20 mg by Oral or FT or NG tube route every morning       levothyroxine (SYNTHROID/LEVOTHROID) 150 MCG tablet 150 mcg by Per Feeding Tube route every morning Confirmed dose with Georgina -      melatonin (MELATONIN) 1 MG/ML LIQD liquid 6 mg by Per NG tube route At Bedtime       Multiple Vitamins-Minerals (CENTRUM SILVER) per tablet Take 1 tablet by mouth daily Crush and feed via j-tube      mupirocin (BACTROBAN) 2 % external ointment Apply topically 2 times  daily as needed       pantoprazole (PROTONIX) 2 mg/mL SUSP suspension 20 mLs (40 mg) by Per J Tube route daily  Qty: 400 mL, Refills: 1    Associated Diagnoses: Gastroesophageal reflux disease, esophagitis presence not specified      potassium & sodium phosphates (NEUTRA-PHOS) 280-160-250 MG Packet Take 1 packet by mouth 3 times daily 0900, 1500, 2100.       testosterone cypionate (DEPOTESTOTERONE CYPIONATE) 200 MG/ML injection Inject 76 mg into the muscle See Admin Instructions Every 2 weeks on Fridays   76 mg or 0.38 mL      vitamin D3 (CHOLECALCIFEROL) 2000 units (50 mcg) tablet Take 2,000 Units by mouth daily Crush and feed via j-tube @@ 0900      order for DME Equipment being ordered: Nebulizer  Qty: 1 Units, Refills: 0    Associated Diagnoses: Pneumonia of both lower lobes due to infectious organism (H)       !! - Potential duplicate medications found. Please discuss with provider.      STOP taking these medications       amoxicillin-clavulanate (AUGMENTIN) 875-125 MG tablet Comments:   Reason for Stopping:             Allergies   Allergies   Allergen Reactions     Dilantin [Phenytoin Sodium]      Valproic Acid      Toxicity c bone marrow suspension, elevated ammonia levels      Data   Most Recent 3 CBC's:  Recent Labs   Lab Test 08/14/19  0407 08/13/19  0600 08/12/19  2200 08/12/19  1313   WBC 9.7 23.3*  --  14.7*   HGB 10.1* 11.6*  --  13.7   MCV 89 89  --  88   * 186 186 212      Most Recent 3 BMP's:  Recent Labs   Lab Test 08/14/19  0407 08/13/19  1750 08/13/19  0600 08/12/19  2200 08/12/19  1313   * 156* 156*  --  144   POTASSIUM 3.0*  --  3.4  --  3.9   CHLORIDE 125*  --  127*  --  113*   CO2 25  --  22  --  26   BUN 12  --  14  --  25   CR 0.79  --  0.96 1.08 1.25   ANIONGAP 6  --  7  --  5   STEVE 7.2*  --  7.7*  --  8.0*   *  --  147*  --  167*     Most Recent 2 LFT's:  Recent Labs   Lab Test 06/17/19  1830 05/27/19  0853   AST 14 15   ALT 24 18   ALKPHOS 110 99   BILITOTAL 0.2 0.8      Most Recent INR's and Anticoagulation Dosing History:  Anticoagulation Dose History     Recent Dosing and Labs Latest Ref Rng & Units 12/1/2016 1/22/2017 1/22/2017 1/23/2017 1/25/2017 1/30/2017 2/7/2017    INR 0.86 - 1.14 1.30(H) 2.48(H) 2.58(H) 1.53(H) 1.49(H) 1.32(H) 1.27(H)        Most Recent 3 Troponin's:  Recent Labs   Lab Test 01/22/17  0500 01/21/17  2348 01/21/17  1600   TROPI 0.017 0.025 0.028     Most Recent 6 Bacteria Isolates From Any Culture (See EPIC Reports for Culture Details):  Recent Labs   Lab Test 08/12/19  2300 08/12/19  1521 08/12/19  1405 08/12/19  1314 06/17/19  1931 06/17/19  1855   CULT Culture in progress >10 Squamous epithelial cells/low power field indicates oral contamination. Please   recollect.  *  Canceled, Test credited  Notification of test cancellation was given to  Katie Jones RN, @1939 08/12/19.DH.   No growth after 2 days  >100,000 colonies/mL  Lactose fermenting gram negative rods  *  >100,000 colonies/mL  Non lactose fermenting gram negative rods  *  >100,000 colonies/mL  Gram positive cocci  Strain 1  *  10,000 to 50,000 colonies/mL  Gram positive cocci  Strain 2  *  Susceptibility testing not routinely done  Susceptibility upon request   No growth after 2 days No growth >100,000 colonies/mL  Enterococcus faecalis  *     Most Recent TSH, T4 and A1c Labs:  Recent Labs   Lab Test 08/12/19  2200  07/05/18  0021   TSH  --   --  <0.01*   T4  --   --  1.66*   A1C 6.1*   < >  --     < > = values in this interval not displayed.     Results for orders placed or performed during the hospital encounter of 08/12/19   XR Chest Port 1 View    Narrative    CHEST PORTABLE ONE VIEW  8/12/2019 2:02 PM     HISTORY: Cough.      Impression    IMPRESSION: Right pulmonary basilar hazy opacity which given elevation  of the right hemidiaphragm could represent atelectasis. Pneumonia is  not excluded. No apparent pleural effusion.    FITO GARLAND MD   POC US GUIDED VASCULAR ACCESS     Impression    Limited vascular access ultrasound, performed and interpreted by me.    Indication:  Septic Shock    Body location: Right femoral vein    Findings: The right femoral vein and artery were identified.  The right femoral vein was accessed and guidewire placed.  Ultrasound confirmed placement.    IMPRESSION: Central femoral vein access obtained.             Time Spent on this Encounter   I, Demarco Ash, personally saw the patient today and spent less than or equal to 30 minutes discharging this patient.    We appreciate the opportunity to care for your patient while in the hospital.  Should you have any questions about their injuries or this discharge summary our contact information is below.

## 2019-08-14 NOTE — PROGRESS NOTES
Care Transition Initial Assessment - RN        Met with: Patient and Family, mother  DATA   Active Problems:    Septic shock (H)       Cognitive Status: awake.        Contact information and PCP information verified: Yes      Insurance concerns: No Insurance issues identified  ASSESSMENT  Patient currently receives the following services:  Home RN's and LPN through [facility]        Identified issues/concerns regarding health management: patient will need Home IV Abx.  Patient will need home IV Abx- IV Zosyn every 6 hours.  Met with patient and patient's mother, Savannah. Savannah first heard of plan when writer called her, Savannah immediately came to hospital. Savannah is not ready for patient to discharge home today. His nurse is not set up for him to come back today. Savannah can get nurse set up for tomorrow afternoon.   Referral placed to [facility] for coverage, per [facility]:pt has [place] and [place], will have a copay per dispense on the drug through part D and then 100% coverage after monthly ded of $3.20.   [facility] wants Home RN orders put as well as Home infusion order. They can not find a agency for today to come for IV home infusion, [facility] is too busy. Spoke with Nathan at [phone]. She wanted writer to find out what agency mother uses for RN's. Left message via voicemail that patient uses [facility], Mother also is interested in [facility] or [facility] [employer]. Now called [facility].  [facility] was wondering if patient needed home therapies as well, PT,OT,SLP?      PLAN  Financial costs for the patient include deductible + $3.20/month.  Patient given options and choices for discharge yes .  Patient/family is agreeable to the plan?  Yes:   Patient anticipates discharging to home with [facility] home infusion. .        Patient anticipates needs for home equipment: Yes  Plan/Disposition: Home   Appointments: See S      Care  (CTS) will continue to follow as needed.

## 2019-08-14 NOTE — PROGRESS NOTES
Therapy: IV abx   Ins[place] and [place]    [place]    Copay per dispense: Yes  Ded: $ $3.20 monthly      Co-Insurance: 100%    In reference to admission on 8/12/19 to check IV abx coverage        Please contact Intake with any questions, [phone] or In Basket pool,  Home Infusion ([phone]).

## 2019-08-14 NOTE — PROGRESS NOTES
Nursing note  Attempted PICC x 2 on basilic and brachial vein with good blood return noted. Both times I was unable to pass the catheter all the way. Placed order for pt to go down to IR, I talked to Seun and Dr. Berger and he said they are doing a case they can't take pt today, but they will be able to take pt tomorrow.The writer went back again and pulled the previous PICC and attempted for the 3 rd time I still hit resistance again. I was able to thread 37 cm,Total CL was 50 cm and VC was 13 cm. The writer notified pt primary nurse.

## 2019-08-15 ENCOUNTER — APPOINTMENT (OUTPATIENT)
Dept: INTERVENTIONAL RADIOLOGY/VASCULAR | Facility: CLINIC | Age: 59
DRG: 853 | End: 2019-08-15
Attending: ANESTHESIOLOGY
Payer: MEDICARE

## 2019-08-15 ENCOUNTER — APPOINTMENT (OUTPATIENT)
Dept: GENERAL RADIOLOGY | Facility: CLINIC | Age: 59
DRG: 853 | End: 2019-08-15
Attending: ANESTHESIOLOGY
Payer: MEDICARE

## 2019-08-15 ENCOUNTER — HOME INFUSION (PRE-WILLOW HOME INFUSION) (OUTPATIENT)
Dept: PHARMACY | Facility: CLINIC | Age: 59
End: 2019-08-15

## 2019-08-15 LAB
ANION GAP SERPL CALCULATED.3IONS-SCNC: 5 MMOL/L (ref 3–14)
BACTERIA SPEC CULT: ABNORMAL
BUN SERPL-MCNC: 13 MG/DL (ref 7–30)
CALCIUM SERPL-MCNC: 6.6 MG/DL (ref 8.5–10.1)
CHLORIDE SERPL-SCNC: 122 MMOL/L (ref 94–109)
CO2 SERPL-SCNC: 27 MMOL/L (ref 20–32)
CREAT SERPL-MCNC: 0.75 MG/DL (ref 0.66–1.25)
ERYTHROCYTE [DISTWIDTH] IN BLOOD BY AUTOMATED COUNT: 15.7 % (ref 10–15)
GFR SERPL CREATININE-BSD FRML MDRD: >90 ML/MIN/{1.73_M2}
GLUCOSE BLDC GLUCOMTR-MCNC: 102 MG/DL (ref 70–99)
GLUCOSE BLDC GLUCOMTR-MCNC: 121 MG/DL (ref 70–99)
GLUCOSE BLDC GLUCOMTR-MCNC: 129 MG/DL (ref 70–99)
GLUCOSE BLDC GLUCOMTR-MCNC: 144 MG/DL (ref 70–99)
GLUCOSE BLDC GLUCOMTR-MCNC: 154 MG/DL (ref 70–99)
GLUCOSE BLDC GLUCOMTR-MCNC: 172 MG/DL (ref 70–99)
GLUCOSE BLDC GLUCOMTR-MCNC: 180 MG/DL (ref 70–99)
GLUCOSE SERPL-MCNC: 139 MG/DL (ref 70–99)
HCT VFR BLD AUTO: 29.9 % (ref 40–53)
HGB BLD-MCNC: 9.6 G/DL (ref 13.3–17.7)
Lab: ABNORMAL
MAGNESIUM SERPL-MCNC: 1.8 MG/DL (ref 1.6–2.3)
MCH RBC QN AUTO: 28.4 PG (ref 26.5–33)
MCHC RBC AUTO-ENTMCNC: 32.1 G/DL (ref 31.5–36.5)
MCV RBC AUTO: 89 FL (ref 78–100)
PHOSPHATE SERPL-MCNC: 3.6 MG/DL (ref 2.5–4.5)
PLATELET # BLD AUTO: 122 10E9/L (ref 150–450)
POTASSIUM SERPL-SCNC: 3.2 MMOL/L (ref 3.4–5.3)
POTASSIUM SERPL-SCNC: 3.3 MMOL/L (ref 3.4–5.3)
POTASSIUM SERPL-SCNC: 3.8 MMOL/L (ref 3.4–5.3)
RBC # BLD AUTO: 3.38 10E12/L (ref 4.4–5.9)
SODIUM SERPL-SCNC: 154 MMOL/L (ref 133–144)
SPECIMEN SOURCE: ABNORMAL
VANCOMYCIN SERPL-MCNC: 20.5 MG/L
WBC # BLD AUTO: 9.5 10E9/L (ref 4–11)

## 2019-08-15 PROCEDURE — 02HV33Z INSERTION OF INFUSION DEVICE INTO SUPERIOR VENA CAVA, PERCUTANEOUS APPROACH: ICD-10-PCS | Performed by: RADIOLOGY

## 2019-08-15 PROCEDURE — 99232 SBSQ HOSP IP/OBS MODERATE 35: CPT | Performed by: ANESTHESIOLOGY

## 2019-08-15 PROCEDURE — 94640 AIRWAY INHALATION TREATMENT: CPT | Mod: 76

## 2019-08-15 PROCEDURE — 25000128 H RX IP 250 OP 636: Performed by: STUDENT IN AN ORGANIZED HEALTH CARE EDUCATION/TRAINING PROGRAM

## 2019-08-15 PROCEDURE — 25000132 ZZH RX MED GY IP 250 OP 250 PS 637: Mod: GY | Performed by: INTERNAL MEDICINE

## 2019-08-15 PROCEDURE — C1769 GUIDE WIRE: HCPCS

## 2019-08-15 PROCEDURE — 25000125 ZZHC RX 250: Performed by: ANESTHESIOLOGY

## 2019-08-15 PROCEDURE — 83735 ASSAY OF MAGNESIUM: CPT | Performed by: INTERNAL MEDICINE

## 2019-08-15 PROCEDURE — 80202 ASSAY OF VANCOMYCIN: CPT | Performed by: INTERNAL MEDICINE

## 2019-08-15 PROCEDURE — 74019 RADEX ABDOMEN 2 VIEWS: CPT

## 2019-08-15 PROCEDURE — 25000128 H RX IP 250 OP 636: Performed by: ANESTHESIOLOGY

## 2019-08-15 PROCEDURE — 25000128 H RX IP 250 OP 636: Performed by: INTERNAL MEDICINE

## 2019-08-15 PROCEDURE — 37248 TRLUML BALO ANGIOP 1ST VEIN: CPT

## 2019-08-15 PROCEDURE — 25000125 ZZHC RX 250

## 2019-08-15 PROCEDURE — 27210845 ZZH DEVICE INFLATION CR5

## 2019-08-15 PROCEDURE — 25800030 ZZH RX IP 258 OP 636: Performed by: INTERNAL MEDICINE

## 2019-08-15 PROCEDURE — 25500064 ZZH RX 255 OP 636: Performed by: INTERNAL MEDICINE

## 2019-08-15 PROCEDURE — C1751 CATH, INF, PER/CENT/MIDLINE: HCPCS

## 2019-08-15 PROCEDURE — 00000146 ZZHCL STATISTIC GLUCOSE BY METER IP

## 2019-08-15 PROCEDURE — 27210429 ZZH NUTRITION PRODUCT INTERMEDIATE LITER

## 2019-08-15 PROCEDURE — 80048 BASIC METABOLIC PNL TOTAL CA: CPT | Performed by: INTERNAL MEDICINE

## 2019-08-15 PROCEDURE — 20000003 ZZH R&B ICU

## 2019-08-15 PROCEDURE — 25000132 ZZH RX MED GY IP 250 OP 250 PS 637: Mod: GY | Performed by: ANESTHESIOLOGY

## 2019-08-15 PROCEDURE — 05PYX3Z REMOVAL OF INFUSION DEVICE FROM UPPER VEIN, EXTERNAL APPROACH: ICD-10-PCS | Performed by: RADIOLOGY

## 2019-08-15 PROCEDURE — 25000132 ZZH RX MED GY IP 250 OP 250 PS 637: Mod: GY | Performed by: STUDENT IN AN ORGANIZED HEALTH CARE EDUCATION/TRAINING PROGRAM

## 2019-08-15 PROCEDURE — 84100 ASSAY OF PHOSPHORUS: CPT | Performed by: INTERNAL MEDICINE

## 2019-08-15 PROCEDURE — 85027 COMPLETE CBC AUTOMATED: CPT | Performed by: INTERNAL MEDICINE

## 2019-08-15 PROCEDURE — 25800029 ZZH RX IP 258 OP 250: Performed by: ANESTHESIOLOGY

## 2019-08-15 PROCEDURE — 25000125 ZZHC RX 250: Performed by: INTERNAL MEDICINE

## 2019-08-15 PROCEDURE — 84132 ASSAY OF SERUM POTASSIUM: CPT | Performed by: STUDENT IN AN ORGANIZED HEALTH CARE EDUCATION/TRAINING PROGRAM

## 2019-08-15 PROCEDURE — 40000275 ZZH STATISTIC RCP TIME EA 10 MIN

## 2019-08-15 PROCEDURE — 05743ZZ DILATION OF LEFT INNOMINATE VEIN, PERCUTANEOUS APPROACH: ICD-10-PCS | Performed by: RADIOLOGY

## 2019-08-15 PROCEDURE — 94640 AIRWAY INHALATION TREATMENT: CPT

## 2019-08-15 RX ORDER — ALBUTEROL SULFATE 0.83 MG/ML
SOLUTION RESPIRATORY (INHALATION)
Status: COMPLETED
Start: 2019-08-15 | End: 2019-08-15

## 2019-08-15 RX ORDER — ACETYLCYSTEINE 200 MG/ML
2 SOLUTION ORAL; RESPIRATORY (INHALATION) 4 TIMES DAILY
Status: DISCONTINUED | OUTPATIENT
Start: 2019-08-15 | End: 2019-08-16 | Stop reason: HOSPADM

## 2019-08-15 RX ORDER — ALBUTEROL SULFATE 0.83 MG/ML
2.5 SOLUTION RESPIRATORY (INHALATION) 4 TIMES DAILY
Status: DISCONTINUED | OUTPATIENT
Start: 2019-08-15 | End: 2019-08-16 | Stop reason: HOSPADM

## 2019-08-15 RX ORDER — TESTOSTERONE CYPIONATE 200 MG/ML
76 INJECTION, SOLUTION INTRAMUSCULAR
Status: DISCONTINUED | OUTPATIENT
Start: 2019-08-23 | End: 2019-08-16 | Stop reason: HOSPADM

## 2019-08-15 RX ORDER — LEVOFLOXACIN 5 MG/ML
500 INJECTION, SOLUTION INTRAVENOUS EVERY 24 HOURS
Status: DISCONTINUED | OUTPATIENT
Start: 2019-08-15 | End: 2019-08-16 | Stop reason: HOSPADM

## 2019-08-15 RX ORDER — LANOLIN ALCOHOL/MO/W.PET/CERES
6 CREAM (GRAM) TOPICAL AT BEDTIME
Status: DISCONTINUED | OUTPATIENT
Start: 2019-08-15 | End: 2019-08-16 | Stop reason: HOSPADM

## 2019-08-15 RX ORDER — DEXAMETHASONE SODIUM PHOSPHATE 4 MG/ML
10 INJECTION, SOLUTION INTRA-ARTICULAR; INTRALESIONAL; INTRAMUSCULAR; INTRAVENOUS; SOFT TISSUE ONCE
Status: COMPLETED | OUTPATIENT
Start: 2019-08-15 | End: 2019-08-15

## 2019-08-15 RX ORDER — IOPAMIDOL 612 MG/ML
50 INJECTION, SOLUTION INTRAVASCULAR ONCE
Status: COMPLETED | OUTPATIENT
Start: 2019-08-15 | End: 2019-08-15

## 2019-08-15 RX ORDER — LIDOCAINE HYDROCHLORIDE 10 MG/ML
INJECTION, SOLUTION INFILTRATION; PERINEURAL
Status: COMPLETED
Start: 2019-08-15 | End: 2019-08-15

## 2019-08-15 RX ADMIN — ACETAMINOPHEN 650 MG: 160 SUSPENSION ORAL at 17:58

## 2019-08-15 RX ADMIN — BRIVARACETAM 100 MG: 10 SOLUTION ORAL at 21:38

## 2019-08-15 RX ADMIN — ACETYLCYSTEINE 2 ML: 200 SOLUTION ORAL; RESPIRATORY (INHALATION) at 21:35

## 2019-08-15 RX ADMIN — LEVOFLOXACIN 500 MG: 5 INJECTION, SOLUTION INTRAVENOUS at 21:06

## 2019-08-15 RX ADMIN — ENOXAPARIN SODIUM 40 MG: 40 INJECTION SUBCUTANEOUS at 21:08

## 2019-08-15 RX ADMIN — VANCOMYCIN HYDROCHLORIDE 1500 MG: 5 INJECTION, POWDER, LYOPHILIZED, FOR SOLUTION INTRAVENOUS at 22:01

## 2019-08-15 RX ADMIN — IOPAMIDOL 10 ML: 612 INJECTION, SOLUTION INTRAVENOUS at 13:51

## 2019-08-15 RX ADMIN — VANCOMYCIN HYDROCHLORIDE 1500 MG: 5 INJECTION, POWDER, LYOPHILIZED, FOR SOLUTION INTRAVENOUS at 08:13

## 2019-08-15 RX ADMIN — HYDROCORTISONE SODIUM SUCCINATE 50 MG: 100 INJECTION, POWDER, FOR SOLUTION INTRAMUSCULAR; INTRAVENOUS at 04:27

## 2019-08-15 RX ADMIN — CARBAMAZEPINE 150 MG: 100 SUSPENSION ORAL at 11:47

## 2019-08-15 RX ADMIN — DEXAMETHASONE SODIUM PHOSPHATE 10 MG: 4 INJECTION, SOLUTION INTRAMUSCULAR; INTRAVENOUS at 19:39

## 2019-08-15 RX ADMIN — Medication 40 MG: at 08:13

## 2019-08-15 RX ADMIN — HYDROCORTISONE SODIUM SUCCINATE 25 MG: 100 INJECTION, POWDER, FOR SOLUTION INTRAMUSCULAR; INTRAVENOUS at 16:20

## 2019-08-15 RX ADMIN — CIPROFLOXACIN 400 MG: 2 INJECTION, SOLUTION INTRAVENOUS at 09:34

## 2019-08-15 RX ADMIN — CARBAMAZEPINE 150 MG: 100 SUSPENSION ORAL at 05:01

## 2019-08-15 RX ADMIN — POTASSIUM & SODIUM PHOSPHATES POWDER PACK 280-160-250 MG 1 PACKET: 280-160-250 PACK at 21:37

## 2019-08-15 RX ADMIN — INSULIN ASPART 1 UNITS: 100 INJECTION, SOLUTION INTRAVENOUS; SUBCUTANEOUS at 11:45

## 2019-08-15 RX ADMIN — ONDANSETRON 4 MG: 2 INJECTION INTRAMUSCULAR; INTRAVENOUS at 14:24

## 2019-08-15 RX ADMIN — ASPIRIN 81 MG 81 MG: 81 TABLET ORAL at 09:33

## 2019-08-15 RX ADMIN — HYDROCORTISONE SODIUM SUCCINATE 25 MG: 100 INJECTION, POWDER, FOR SOLUTION INTRAMUSCULAR; INTRAVENOUS at 23:30

## 2019-08-15 RX ADMIN — ACETYLCYSTEINE 4 ML: 200 SOLUTION ORAL; RESPIRATORY (INHALATION) at 00:54

## 2019-08-15 RX ADMIN — MULTIVITAMIN 15 ML: LIQUID ORAL at 10:41

## 2019-08-15 RX ADMIN — INSULIN ASPART 1 UNITS: 100 INJECTION, SOLUTION INTRAVENOUS; SUBCUTANEOUS at 21:38

## 2019-08-15 RX ADMIN — POTASSIUM CHLORIDE 20 MEQ: 29.8 INJECTION, SOLUTION INTRAVENOUS at 06:21

## 2019-08-15 RX ADMIN — ONDANSETRON 4 MG: 2 INJECTION INTRAMUSCULAR; INTRAVENOUS at 08:13

## 2019-08-15 RX ADMIN — ALBUTEROL SULFATE 2.5 MG: 2.5 SOLUTION RESPIRATORY (INHALATION) at 00:54

## 2019-08-15 RX ADMIN — POTASSIUM CHLORIDE 20 MEQ: 29.8 INJECTION, SOLUTION INTRAVENOUS at 10:28

## 2019-08-15 RX ADMIN — POTASSIUM & SODIUM PHOSPHATES POWDER PACK 280-160-250 MG 1 PACKET: 280-160-250 PACK at 09:33

## 2019-08-15 RX ADMIN — POTASSIUM CHLORIDE 20 MEQ: 29.8 INJECTION, SOLUTION INTRAVENOUS at 11:43

## 2019-08-15 RX ADMIN — ACETYLCYSTEINE 2 ML: 200 SOLUTION ORAL; RESPIRATORY (INHALATION) at 17:03

## 2019-08-15 RX ADMIN — MELATONIN 6 MG: 3 TAB ORAL at 21:37

## 2019-08-15 RX ADMIN — Medication 1 PACKET: at 09:33

## 2019-08-15 RX ADMIN — LEVOTHYROXINE SODIUM 150 MCG: 150 TABLET ORAL at 04:27

## 2019-08-15 RX ADMIN — Medication 1 PACKET: at 21:37

## 2019-08-15 RX ADMIN — ALBUTEROL SULFATE 2.5 MG: 2.5 SOLUTION RESPIRATORY (INHALATION) at 21:35

## 2019-08-15 RX ADMIN — BRIVARACETAM 100 MG: 10 SOLUTION ORAL at 09:32

## 2019-08-15 RX ADMIN — PIPERACILLIN SODIUM AND TAZOBACTAM SODIUM 4.5 G: 4; .5 INJECTION, POWDER, LYOPHILIZED, FOR SOLUTION INTRAVENOUS at 03:56

## 2019-08-15 RX ADMIN — CARBAMAZEPINE 150 MG: 100 SUSPENSION ORAL at 21:09

## 2019-08-15 RX ADMIN — PIPERACILLIN SODIUM AND TAZOBACTAM SODIUM 4.5 G: 4; .5 INJECTION, POWDER, LYOPHILIZED, FOR SOLUTION INTRAVENOUS at 15:16

## 2019-08-15 RX ADMIN — PIPERACILLIN SODIUM AND TAZOBACTAM SODIUM 4.5 G: 4; .5 INJECTION, POWDER, LYOPHILIZED, FOR SOLUTION INTRAVENOUS at 09:33

## 2019-08-15 RX ADMIN — INSULIN ASPART 1 UNITS: 100 INJECTION, SOLUTION INTRAVENOUS; SUBCUTANEOUS at 08:38

## 2019-08-15 RX ADMIN — ACETYLCYSTEINE 2 ML: 200 SOLUTION ORAL; RESPIRATORY (INHALATION) at 08:23

## 2019-08-15 RX ADMIN — INSULIN ASPART 1 UNITS: 100 INJECTION, SOLUTION INTRAVENOUS; SUBCUTANEOUS at 23:35

## 2019-08-15 RX ADMIN — ALBUTEROL SULFATE 2.5 MG: 2.5 SOLUTION RESPIRATORY (INHALATION) at 08:22

## 2019-08-15 RX ADMIN — SODIUM CHLORIDE 75 ML: 4.5 INJECTION, SOLUTION INTRAVENOUS at 19:38

## 2019-08-15 RX ADMIN — ALBUTEROL SULFATE 2.5 MG: 2.5 SOLUTION RESPIRATORY (INHALATION) at 17:04

## 2019-08-15 RX ADMIN — POTASSIUM & SODIUM PHOSPHATES POWDER PACK 280-160-250 MG 1 PACKET: 280-160-250 PACK at 16:24

## 2019-08-15 RX ADMIN — LIDOCAINE HYDROCHLORIDE 9 ML: 10 INJECTION, SOLUTION INFILTRATION; PERINEURAL at 13:51

## 2019-08-15 RX ADMIN — POTASSIUM CHLORIDE 20 MEQ: 29.8 INJECTION, SOLUTION INTRAVENOUS at 05:01

## 2019-08-15 RX ADMIN — CARBAMAZEPINE 150 MG: 100 SUSPENSION ORAL at 00:32

## 2019-08-15 RX ADMIN — HYDROCORTISONE SODIUM SUCCINATE 50 MG: 100 INJECTION, POWDER, FOR SOLUTION INTRAMUSCULAR; INTRAVENOUS at 10:27

## 2019-08-15 ASSESSMENT — ACTIVITIES OF DAILY LIVING (ADL)
ADLS_ACUITY_SCORE: 41
ADLS_ACUITY_SCORE: 39

## 2019-08-15 NOTE — DISCHARGE INSTRUCTIONS
Your doctor has ordered home care to help you after your hospital stay.  They will contact you regarding your first visit.  This service will be provided by [facility].  If you have any questions about their services, please call them at [phone].

## 2019-08-15 NOTE — PROGRESS NOTES
Critical Care Progress Note      08/15/2019    Name: Keyon Farias MRN#: [id number]   Age: 59 year old YOB: 1960     Hsptl Day# 3  ICU DAY #    MV DAY #             Problem List:   Active Problems:    Septic shock (H)           Summary/Hospital Course:     59 year old male with prior TBI now with chronic deficits including aphasia and cognitive deficits who now presented to the ED for fever and coughing.  He apparently has chronic aphasia and his mother who provided history to the ED physician has had recent fever and cough.  He also recent had his GJ tube replaced within the last week.   In the ED he was found to be hypotensive which was recalcitrant to IV fluid resuscitation and had a lactate in the 3s.  A central line was placed and he was started on norepi.      Assessment and plan :     Keyon Farias IS a 59 year old male admitted on 8/12/2019 for septic shock   I have personally reviewed the daily labs, imaging studies, cultures and discussed the case with referring physician and consulting physicians.     My assessment and plan by system for this patient is as follows:    Neurology/Psychiatry:   1. TBI   2. Analgesia: Tylenol, oxycodone PRN  3. Seizures: carbamazepine, brivaracetam  Plan  Continue current medications    Cardiovascular:   1.Hemodynamics -currently off pressors   2.Rhythm - NSR  3. Ischemia - none  Plan   Continue to monitor    Pulmonary/Ventilator Management:   1. Airway not intubated   2. Oxygenation/ventilation/mechanics  Initially considered to CAP versus pneumonitis but septic shock likely due to UTI  Nurse has observed desaturation episodes overnight to the 60s  Plan  - Consider CPAP at night    GI and Nutrition :   1. GJ feedings. History of dysphagia  2. Vomiting projectile per nursing: Will do an abdominal series, abdomen remains soft and non tender.  Plan  - Evaluate the GJ tube position    Renal/Fluids/Electrolytes:   1. Normal creatinine today initial Creat 1.25  2.  Electrolyte abnormality: hypokalemia  3. Acid/base status: normalized  4. Volume status: euvolemic  Plan  -  monitor function and electrolytes as needed with replacement per ICU protocols.  - generally avoid nephrotoxic agents such as NSAID, IV contrast unless specifically required  - adjust medications as needed for renal clearance  - follow I/O's as appropriate.    Infectious Disease:   1. Multiple gram positive and gram negative bacteria noted   2. MRSA positive of nares  3.Cdiff negative done on 8/12/2019  4. Urine: lactose fermenting gram negative rods  Gram positive cocci, non lactose fermenting gram negative rods:   Ecoli growing pan sensitive  Pseudomonas aeruginosa resistant to cefepime, ceftazidime, meropenam, piptazo  Plan  - stop piptazo and cipraflox  Continue vanc and start levoquin (due to transition necessary from cipraflox to levoquin for home antibiotics)    Endocrine:   1.  Stress induced hyperglycemia  2. 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 On testosterone  Plan  - ICU insulin protocol, goal sugar <180      Hematology/Oncology:   1 Anemia, no signs, symptoms of active blood loss    Plan  - Continue to monitor      MSKL/Rheum:  1. Right buttock wound present on admission    Plan  - Wound nursing consult done      IV/Access:   1. Venous access - PICC line   2. Arterial access -  None  3.  Plan  - central access required and necessary      ICU Prophylaxis:   1. DVT: Hep Subq/ LMWH/mechanical  2. VAP: HOB 30 degrees, chlorhexidine rinse  3. Stress Ulcer: PPI/H2 blocker  4. Restraints: Nonviolent soft two point restraints required and necessary for patient safety and continued cares and good effect as patient continues to pull at necessary lines, tubes despite education and distraction. Will readdress daily.   5. Wound care - per unit routine   6. Feeding - GJ   7. Family Update:spoke to the mother  8. Disposition - ICU        Key goals for next 24 hours:   1. Evaluate the GJ tube as a  probable cause for projectile vomiting  2. Changed antibiotics based on sensitivities             Key Medications:       acetylcysteine  2 mL Nebulization Q6H     albuterol  2.5 mg Nebulization Q6H     aspirin  81 mg Per G Tube Daily     Brivaracetam  100 mg Oral or Feeding Tube BID     carBAMazepine  150 mg Per GJ tube Q6H ALONDRA     ciprofloxacin  400 mg Intravenous Q12H     dexamethasone  10 mg Intravenous Once     enoxaparin  40 mg Subcutaneous Q24H     fiber modular (NUTRISOURCE FIBER)  1 packet Per Feeding Tube Daily     hydrocortisone sodium succinate PF  50 mg Intravenous Q6H     insulin aspart  1-6 Units Subcutaneous Q4H     levothyroxine  150 mcg Oral or Feeding Tube Daily     multivitamins w/minerals  15 mL Per Feeding Tube Daily     pantoprazole  40 mg Oral or Feeding Tube QAM AC     piperacillin-tazobactam  4.5 g Intravenous Q6H     potassium & sodium phosphates  1 packet Oral TID     protein modular  1 packet Per Feeding Tube Q12H     sodium chloride (PF)  10 mL Intracatheter Q7 Days     vancomycin (VANCOCIN) IV  1,500 mg Intravenous Q12H       norepinephrine Stopped (08/13/19 1515)     NaCl 75 mL/hr at 08/15/19 1008     sodium chloride Stopped (08/13/19 1726)               Physical Examination:   Temp:  [96.8  F (36  C)-98  F (36.7  C)] 98  F (36.7  C)  Pulse:  [] 68  Heart Rate:  [] 64  Resp:  [10-29] 28  BP: ()/(47-95) 95/55  SpO2:  [78 %-100 %] 93 %    Intake/Output Summary (Last 24 hours) at 8/15/2019 1513  Last data filed at 8/15/2019 1400  Gross per 24 hour   Intake 5778 ml   Output 2135 ml   Net 3643 ml     Wt Readings from Last 4 Encounters:   08/15/19 75.4 kg (166 lb 3.6 oz)   08/07/19 72.6 kg (160 lb)   06/19/19 72.4 kg (159 lb 11.2 oz)   05/30/19 74.5 kg (164 lb 3.9 oz)     BP - Mean:  [] 74  Resp: 28    No lab results found in last 7 days.    GEN: no acute distress   HEENT: head ncat, sclera anicteric, OP patent, trachea midline trach scar present  PULM: unlabored  synchronous. Clear to auscultation bilaterally  CV/COR: RRR S1S2 no gallop,  No rub, no murmur  ABD: soft nontender, hypoactive bowel sounds, no mass  EXT:  Edema   warm  NEURO: grossly intact  SKIN: no obvious rash, right buttock wound  LINES: clean, dry intact         Data:   All data and imaging reviewed     ROUTINE ICU LABS (Last four results)  CMP  Recent Labs   Lab 08/15/19  1450 08/15/19  0930 08/15/19  0419 08/14/19  2055 08/14/19  1627 08/14/19  0407 08/13/19  1750 08/13/19  0600  08/12/19  2200 08/12/19  1313   NA  --   --  154*  --   --  156* 156* 156*  --   --  144   POTASSIUM 3.8 3.3* 3.2* 3.5 3.2* 3.0*  --  3.4  --   --  3.9   CHLORIDE  --   --  122*  --   --  125*  --  127*  --   --  113*   CO2  --   --  27  --   --  25  --  22  --   --  26   ANIONGAP  --   --  5  --   --  6  --  7  --   --  5   GLC  --   --  139*  --   --  160*  --  147*  --   --  167*   BUN  --   --  13  --   --  12  --  14  --   --  25   CR  --   --  0.75  --   --  0.79  --  0.96  --  1.08 1.25   GFRESTIMATED  --   --  >90  --   --  >90  --  88  --  76 64   GFRESTBLACK  --   --  >90  --   --  >90  --  >90  --  88 74   STEVE  --   --  6.6*  --   --  7.2*  --  7.7*  --   --  8.0*   MAG  --   --  1.8 1.9  --  1.9  --  2.1  --   --   --    PHOS  --   --  3.6  --  2.1* 2.4* 2.2* 1.4*   < >  --   --     < > = values in this interval not displayed.     CBC  Recent Labs   Lab 08/15/19  0419 08/14/19  0407 08/13/19  0600 08/12/19  2200 08/12/19  1313   WBC 9.5 9.7 23.3*  --  14.7*   RBC 3.38* 3.51* 4.02*  --  4.75   HGB 9.6* 10.1* 11.6*  --  13.7   HCT 29.9* 31.3* 35.6*  --  41.9   MCV 89 89 89  --  88   MCH 28.4 28.8 28.9  --  28.8   MCHC 32.1 32.3 32.6  --  32.7   RDW 15.7* 15.6* 15.2*  --  15.1*   * 117* 186 186 212     INRNo lab results found in last 7 days.  Arterial Blood GasNo lab results found in last 7 days.    All cultures:  Recent Labs   Lab 08/12/19  2300 08/12/19  1521 08/12/19  1405 08/12/19  1314   CULT Methicillin  resistant Staphylococcus aureus (MRSA) isolated* >10 Squamous epithelial cells/low power field indicates oral contamination. Please   recollect.  *  Canceled, Test credited  Notification of test cancellation was given to  Katie Jones RN, @4058 08/12/19..   No growth after 3 days  >100,000 colonies/mL  Lactose fermenting gram negative rods  *  >100,000 colonies/mL  Non lactose fermenting gram negative rods  *  >100,000 colonies/mL  Gram positive cocci  Strain 1  *  10,000 to 50,000 colonies/mL  Gram positive cocci  Strain 2  *  Susceptibility testing not routinely done  Susceptibility upon request   No growth after 3 days     No results found for this or any previous visit (from the past 24 hour(s)).      Billing: This patient is critically ill: No   Total  care time today 24 min.

## 2019-08-15 NOTE — PROVIDER NOTIFICATION
Pt has had numerous emesis since approx. 3 pm. Interventions performed without much relief. Appears as if pt is coughing/gagging/overstimulated so much it is causing him to vomit. Pt assisted to upright posture, attempts to suction upper airways d/t LS without success as pt would refuse by not opening his mouth and turning had back and forth to refuse oxymask. Pt's o2 sats have seemed to stabilize now.   Discussion with Dr. Holly regarding LS compared to earlier and current status of pt.     No new orders for now but will continue to monitor and report back.

## 2019-08-15 NOTE — PLAN OF CARE
Episodes of desat's into 60's noted when patient both asleep and awake. Dr. Ash updated. Will try CPAP as patient tolerates.

## 2019-08-15 NOTE — PROGRESS NOTES
Care Coordination:    Continuing to follow to arrange IV antibiotics and home care (RN PT OT) at discharge.  Contacted [facility] to determine progress of locating available agency:  They state [facility] ([phone]) will take this patient once PICC placed.    I contacted [facility], who confirm they will be taking this patient when PICC placed.  They would like orders faxed to [phone] when available (please include face-to-face order).      I also added [facility] info to AVS:   Your doctor has ordered home care to help you after your hospital stay.  They will contact you regarding your first visit.  This service will be provided by [facility].  If you have any questions about their services, please call them at [phone].      Left note for MD: Patient must have the following orders prior to discharge: Home Infusion Referral, *plus* Home Care RN, Physical Therapy, and Occupational Therapy, *with* face-to-face order/documentation.    Brooke Ivory RN, BSN  [facility] Care Coordinator   Mobile Phone: [phone]

## 2019-08-15 NOTE — PHARMACY-VANCOMYCIN DOSING SERVICE
Pharmacy Vancomycin Note  Date of Service August 15, 2019  Patient's  1960   59 year old, male    Indication: Sepsis  Goal Trough Level: 15-20 mg/L  Day of Therapy: 4  Current Vancomycin regimen:  1500 mg IV q12h    Current estimated CrCl = Estimated Creatinine Clearance: 115.9 mL/min (based on SCr of 0.75 mg/dL).    Creatinine for last 3 days  2019:  1:13 PM Creatinine 1.25 mg/dL; 10:00 PM Creatinine 1.08 mg/dL  2019:  6:00 AM Creatinine 0.96 mg/dL  2019:  4:07 AM Creatinine 0.79 mg/dL  8/15/2019:  4:19 AM Creatinine 0.75 mg/dL    Recent Vancomycin Levels (past 3 days)  2019:  5:50 PM Vancomycin Level 15.6 mg/L  8/15/2019:  6:24 AM Vancomycin Level 20.5 mg/L    Vancomycin IV Administrations (past 72 hours)                   vancomycin 1500 mg in 0.9% NaCl 250 ml intermittent infusion 1,500 mg (mg) 1,500 mg Given 19     1,500 mg Given  0754     1,500 mg Given 19    vancomycin 1500 mg in 0.9% NaCl 250 ml intermittent infusion 1,500 mg (mg) 1,500 mg Given 19 0608                Nephrotoxins and other renal medications (From now, onward)    Start     Dose/Rate Route Frequency Ordered Stop    19 0000  piperacillin-tazobactam (ZOSYN) 4-0.5 GM vial      4.5 g  over 30 Minutes Intravenous EVERY 6 HOURS 19 1327 19 2359    19 1900  vancomycin 1500 mg in 0.9% NaCl 250 ml intermittent infusion 1,500 mg      1,500 mg  over 90 Minutes Intravenous EVERY 12 HOURS 19 1851      19 0900  piperacillin-tazobactam (ZOSYN) 4.5 g vial to attach to  mL bag      4.5 g  over 30 Minutes Intravenous EVERY 6 HOURS 19 0830      19 1733  norepinephrine (LEVOPHED) 16 mg in  mL infusion      0.03-0.4 mcg/kg/min × 72.6 kg  2-27.2 mL/hr  Intravenous CONTINUOUS 19 4489               Contrast Orders - past 72 hours (72h ago, onward)    None          Interpretation of levels and current regimen:  Trough level is very slightly  Supratherapeutic    Has serum creatinine changed > 50% in last 72 hours: No    Urine output:  unable to determine    Renal Function: Stable    Plan:  1.  Continue Current Dose - would air on the side of higher trough given hx of MRSA, multiple hospitalizations, etc.   2.  Pharmacy will check trough levels as appropriate in 1-3 Days.    3. Serum creatinine levels will be ordered daily for the first week of therapy and at least twice weekly for subsequent weeks.      Kennedi Willoughby, PharmD        .

## 2019-08-15 NOTE — PLAN OF CARE
Pt neuros unchanged. Pt not making productive sputumn however is coughing frequently while awake. LI-Deejzi-allytmyoxv with exertion/stimulation. Pt had multiple emesis episodes in the late evening last night which subsided (see provider notification note). Pt also is having frequent stools. Buttocks denuded appears to be r/t incontinence induced dermatitis. Pt is to get IR placed Picc line today to plan for discharge.

## 2019-08-15 NOTE — PROGRESS NOTES
Home Infusion  Keyon is expected to dc in the next couple of days and will be going home on IV Zosyn. Keyon has not done home IV therapy before.  Met with Keyon's mother Savannah who is his oprimary caregiver and will be administering the Zosyn.  Provided Savannah with information about I services. Explained about administration method, showed her the teaching materials and explained that a home nurse will provide additional teaching needed for caregiver-administration. Informed her about supplies and delivery of supplies, storage of medication, dosing times, plan for SNV and 24/7 availability of I staff while on IV therapy.Savannah verbalized understanding of all information given. She is willing and able to learn and manage home IV therapy. Questions answered.Will continue to follow until dc and update pt once final orders are determined.  Patient going to  to get PICC today.    Bonny Pabon, CLAYTONN, LYNNETTE  [facility]  [phone]

## 2019-08-16 ENCOUNTER — HOME INFUSION (PRE-WILLOW HOME INFUSION) (OUTPATIENT)
Dept: PHARMACY | Facility: CLINIC | Age: 59
End: 2019-08-16

## 2019-08-16 VITALS
HEART RATE: 87 BPM | WEIGHT: 166.23 LBS | DIASTOLIC BLOOD PRESSURE: 69 MMHG | TEMPERATURE: 97.7 F | SYSTOLIC BLOOD PRESSURE: 123 MMHG | RESPIRATION RATE: 22 BRPM | BODY MASS INDEX: 23.18 KG/M2 | OXYGEN SATURATION: 95 %

## 2019-08-16 LAB
ANION GAP SERPL CALCULATED.3IONS-SCNC: 4 MMOL/L (ref 3–14)
BUN SERPL-MCNC: 15 MG/DL (ref 7–30)
CALCIUM SERPL-MCNC: 7.2 MG/DL (ref 8.5–10.1)
CHLORIDE SERPL-SCNC: 120 MMOL/L (ref 94–109)
CO2 SERPL-SCNC: 26 MMOL/L (ref 20–32)
CREAT SERPL-MCNC: 0.66 MG/DL (ref 0.66–1.25)
ERYTHROCYTE [DISTWIDTH] IN BLOOD BY AUTOMATED COUNT: 15.9 % (ref 10–15)
GFR SERPL CREATININE-BSD FRML MDRD: >90 ML/MIN/{1.73_M2}
GLUCOSE BLDC GLUCOMTR-MCNC: 131 MG/DL (ref 70–99)
GLUCOSE BLDC GLUCOMTR-MCNC: 74 MG/DL (ref 70–99)
GLUCOSE SERPL-MCNC: 137 MG/DL (ref 70–99)
HCT VFR BLD AUTO: 30.5 % (ref 40–53)
HGB BLD-MCNC: 9.2 G/DL (ref 13.3–17.7)
MAGNESIUM SERPL-MCNC: 2.2 MG/DL (ref 1.6–2.3)
MCH RBC QN AUTO: 27.3 PG (ref 26.5–33)
MCHC RBC AUTO-ENTMCNC: 30.2 G/DL (ref 31.5–36.5)
MCV RBC AUTO: 91 FL (ref 78–100)
PHOSPHATE SERPL-MCNC: 2.8 MG/DL (ref 2.5–4.5)
PLATELET # BLD AUTO: 115 10E9/L (ref 150–450)
POTASSIUM SERPL-SCNC: 3.8 MMOL/L (ref 3.4–5.3)
RBC # BLD AUTO: 3.37 10E12/L (ref 4.4–5.9)
SODIUM SERPL-SCNC: 150 MMOL/L (ref 133–144)
WBC # BLD AUTO: 6.6 10E9/L (ref 4–11)

## 2019-08-16 PROCEDURE — 40000275 ZZH STATISTIC RCP TIME EA 10 MIN

## 2019-08-16 PROCEDURE — 25000128 H RX IP 250 OP 636: Performed by: ANESTHESIOLOGY

## 2019-08-16 PROCEDURE — 40000239 ZZH STATISTIC VAT ROUNDS

## 2019-08-16 PROCEDURE — 84100 ASSAY OF PHOSPHORUS: CPT | Performed by: INTERNAL MEDICINE

## 2019-08-16 PROCEDURE — 94640 AIRWAY INHALATION TREATMENT: CPT | Mod: 76

## 2019-08-16 PROCEDURE — 27210429 ZZH NUTRITION PRODUCT INTERMEDIATE LITER

## 2019-08-16 PROCEDURE — 94640 AIRWAY INHALATION TREATMENT: CPT

## 2019-08-16 PROCEDURE — 83735 ASSAY OF MAGNESIUM: CPT | Performed by: INTERNAL MEDICINE

## 2019-08-16 PROCEDURE — 25000128 H RX IP 250 OP 636: Performed by: INTERNAL MEDICINE

## 2019-08-16 PROCEDURE — 00000146 ZZHCL STATISTIC GLUCOSE BY METER IP

## 2019-08-16 PROCEDURE — 25000125 ZZHC RX 250: Performed by: ANESTHESIOLOGY

## 2019-08-16 PROCEDURE — 80048 BASIC METABOLIC PNL TOTAL CA: CPT | Performed by: INTERNAL MEDICINE

## 2019-08-16 PROCEDURE — 99238 HOSP IP/OBS DSCHRG MGMT 30/<: CPT | Performed by: ANESTHESIOLOGY

## 2019-08-16 PROCEDURE — 85027 COMPLETE CBC AUTOMATED: CPT | Performed by: INTERNAL MEDICINE

## 2019-08-16 PROCEDURE — 93005 ELECTROCARDIOGRAM TRACING: CPT

## 2019-08-16 PROCEDURE — 25800030 ZZH RX IP 258 OP 636: Performed by: INTERNAL MEDICINE

## 2019-08-16 PROCEDURE — 25000132 ZZH RX MED GY IP 250 OP 250 PS 637: Mod: GY | Performed by: INTERNAL MEDICINE

## 2019-08-16 PROCEDURE — 25000132 ZZH RX MED GY IP 250 OP 250 PS 637: Mod: GY | Performed by: STUDENT IN AN ORGANIZED HEALTH CARE EDUCATION/TRAINING PROGRAM

## 2019-08-16 RX ORDER — ALBUTEROL SULFATE 0.83 MG/ML
2.5 SOLUTION RESPIRATORY (INHALATION) 4 TIMES DAILY
Qty: 84 ML | Refills: 0 | Status: SHIPPED | OUTPATIENT
Start: 2019-08-16 | End: 2019-08-16

## 2019-08-16 RX ORDER — LEVOFLOXACIN 5 MG/ML
500 INJECTION, SOLUTION INTRAVENOUS EVERY 24 HOURS
Qty: 90 EACH | Refills: 0 | Status: ON HOLD | OUTPATIENT
Start: 2019-08-16 | End: 2019-09-14

## 2019-08-16 RX ORDER — ACETYLCYSTEINE 200 MG/ML
2 SOLUTION ORAL; RESPIRATORY (INHALATION) 4 TIMES DAILY
Qty: 56 ML | Refills: 0 | Status: SHIPPED | OUTPATIENT
Start: 2019-08-16 | End: 2019-08-16

## 2019-08-16 RX ORDER — ONDANSETRON 4 MG/1
4 TABLET, FILM COATED ORAL EVERY 8 HOURS PRN
Qty: 21 TABLET | Refills: 0 | Status: ON HOLD | OUTPATIENT
Start: 2019-08-16 | End: 2019-09-14

## 2019-08-16 RX ADMIN — ACETYLCYSTEINE 2 ML: 200 SOLUTION ORAL; RESPIRATORY (INHALATION) at 08:16

## 2019-08-16 RX ADMIN — Medication 1 PACKET: at 08:23

## 2019-08-16 RX ADMIN — CARBAMAZEPINE 150 MG: 100 SUSPENSION ORAL at 03:44

## 2019-08-16 RX ADMIN — LEVOTHYROXINE SODIUM 150 MCG: 150 TABLET ORAL at 04:56

## 2019-08-16 RX ADMIN — BRIVARACETAM 100 MG: 10 SOLUTION ORAL at 08:22

## 2019-08-16 RX ADMIN — ALBUTEROL SULFATE 2.5 MG: 2.5 SOLUTION RESPIRATORY (INHALATION) at 08:16

## 2019-08-16 RX ADMIN — ONDANSETRON 4 MG: 2 INJECTION INTRAMUSCULAR; INTRAVENOUS at 08:23

## 2019-08-16 RX ADMIN — ACETYLCYSTEINE 2 ML: 200 SOLUTION ORAL; RESPIRATORY (INHALATION) at 12:01

## 2019-08-16 RX ADMIN — MULTIVITAMIN 15 ML: LIQUID ORAL at 08:23

## 2019-08-16 RX ADMIN — ALBUTEROL SULFATE 2.5 MG: 2.5 SOLUTION RESPIRATORY (INHALATION) at 12:01

## 2019-08-16 RX ADMIN — VANCOMYCIN HYDROCHLORIDE 1500 MG: 5 INJECTION, POWDER, LYOPHILIZED, FOR SOLUTION INTRAVENOUS at 08:23

## 2019-08-16 RX ADMIN — POTASSIUM & SODIUM PHOSPHATES POWDER PACK 280-160-250 MG 1 PACKET: 280-160-250 PACK at 08:23

## 2019-08-16 RX ADMIN — Medication 40 MG: at 08:23

## 2019-08-16 RX ADMIN — ASPIRIN 81 MG 81 MG: 81 TABLET ORAL at 08:22

## 2019-08-16 RX ADMIN — HYDROCORTISONE SODIUM SUCCINATE 25 MG: 100 INJECTION, POWDER, FOR SOLUTION INTRAMUSCULAR; INTRAVENOUS at 04:56

## 2019-08-16 RX ADMIN — CARBAMAZEPINE 150 MG: 100 SUSPENSION ORAL at 08:23

## 2019-08-16 ASSESSMENT — ACTIVITIES OF DAILY LIVING (ADL)
ADLS_ACUITY_SCORE: 39

## 2019-08-16 NOTE — PLAN OF CARE
Pt consistently calls out specifically when he needs something or is having a bowel movement, which is very painful for him.  Buttocks and coccyx area denuded and raw.  Even very light and gentle cleansing requires efforts of two to assist in proper care.  HR hector with sleep along with desaturation to 78%.  Simple O2 mask placed but pt declines wearing it and refused CPAP overnight.  O2 sats primarily in the high 90's t/o the night.

## 2019-08-16 NOTE — PROGRESS NOTES
Medications received from discharge pharmacy (vanco, levofloxacin, and zofran). Talked with MD Ash who talked with infusion pharmacist. IV vanco ordered for home infusion, confirmed by pharmacist and discharge pharmacist here. Oral vanco returned to discharge pharmacy. Levo and zofran sent with HE transport. Talked with mother who was told infusion care was not set up, told her to call back because it was confirmed on our end.

## 2019-08-16 NOTE — PROGRESS NOTES
Pt alert, points and smiles to make needs known (baseline TBI). Tolerating TF and oral meds today. Small emesis after bought of forceful coughing with gagging. Lerma removed, BM x2. VSS. Left with HE transport.

## 2019-08-16 NOTE — PROGRESS NOTES
Care Coordination:    Patient ok'd to discharge today. [facility] ride set up for 12:30pm. Mother Savannah updated by this writer. Mother is picking up meds, Protonix, at patient's pharmacy.    Zee Stout RN BSN  [facility] Care Coordinator  Phone [phone]

## 2019-08-16 NOTE — PROGRESS NOTES
Critical Care Progress Note      08/16/2019    Name: Keyon Farias MRN#: [id number]   Age: 59 year old YOB: 1960     Hsptl Day# 4  ICU DAY #    MV DAY #             Problem List:   Active Problems:    Septic shock (H)           Summary/Hospital Course:     59 year old male with prior TBI now with chronic deficits including aphasia and cognitive deficits who now presented to the ED for fever and coughing.  He apparently has chronic aphasia and his mother who provided history to the ED physician has had recent fever and cough.  He also recent had his GJ tube replaced within the last week.   In the ED he was found to be hypotensive which was recalcitrant to IV fluid resuscitation and had a lactate in the 3s.  A central line was placed and he was started on norepi.    8/16/2019: stopped vomiting over night.        Assessment and plan :      Keyon Farias IS a 59 year old male admitted on 8/12/2019 for septic shock   I have personally reviewed the daily labs, imaging studies, cultures and discussed the case with referring physician and consulting physicians.      My assessment and plan by system for this patient is as follows:     Neurology/Psychiatry:   1. TBI   2. Analgesia: Tylenol, oxycodone PRN  3. Seizures: carbamazepine, brivaracetam  Plan  Continue current medications     Cardiovascular:   1.Hemodynamics -currently off pressors   2.Rhythm - NSR  3. Ischemia - none  Plan   Continue to monitor     Pulmonary/Ventilator Management:   1. Airway not intubated   2. Oxygenation/ventilation/mechanics  Initially considered to CAP versus pneumonitis but septic shock likely due to UTI  Nurse has observed desaturation episodes overnight to the 60s. Patient does not tolerate CPAP   Plan  - Continue oxygen at night     GI and Nutrition :   1. GJ feedings. History of dysphagia  2. Vomiting projectile per nursing: Abdominal series and GJ tube evaluation done which did show any issues   Plan  - Current status is  stable but we will continue zofran as needed     Renal/Fluids/Electrolytes:   1. Normal creatinine today initial Creat 1.25  2. Electrolyte abnormality: hypokalemia corrected  3. Acid/base status: normalized  4. Volume status: euvolemic  Plan  -  monitor function and electrolytes as needed with replacement per ICU protocols.  - generally avoid nephrotoxic agents such as NSAID, IV contrast unless specifically required  - adjust medications as needed for renal clearance  - follow I/O's as appropriate.     Infectious Disease:   1. Multiple gram positive and gram negative bacteria noted   2. MRSA positive of nares  3.Cdiff negative done on 8/12/2019  4. Urine: lactose fermenting gram negative rods  Gram positive cocci, non lactose fermenting gram negative rods:   Ecoli growing pan sensitive  Pseudomonas aeruginosa resistant to cefepime, ceftazidime, meropenam, piptazo  Plan  - stoppped piptazo and cipraflox yesterday  Continue vanc and start levoquin (due to transition necessary from cipraflox to levoquin for home antibiotics)     Endocrine:   1.  Stress induced hyperglycemia  2. 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 On testosterone  Plan  - ICU insulin protocol, goal sugar <180        Hematology/Oncology:   1 Anemia, no signs, symptoms of active blood loss     Plan  - Continue to monitor        MSKL/Rheum:  1. Right buttock wound present on admission     Plan  - Wound nursing consult done        IV/Access:   1. Venous access - PICC line   2. Arterial access -  None  3.  Plan  - central access required and necessary        ICU Prophylaxis:   1. DVT: Hep Subq/ LMWH/mechanical  2. VAP: HOB 30 degrees, chlorhexidine rinse  3. Stress Ulcer: PPI/H2 blocker  4. Restraints: Nonviolent soft two point restraints required and necessary for patient safety and continued cares and good effect as patient continues to pull at necessary lines, tubes despite education and distraction. Will readdress daily.   5. Wound care -  per unit routine   6. Feeding - GJ   7. Family Update:spoke to the mother  8. Disposition - home with home nursing care        Key goals for next 24 hours:   1. Can go home and continue antibiotics for home use  2. Changed antibiotics based on sensitivities             Key Medications:       acetylcysteine  2 mL Nebulization 4x Daily     albuterol  2.5 mg Nebulization 4x Daily     aspirin  81 mg Per G Tube Daily     Brivaracetam  100 mg Oral or Feeding Tube BID     carBAMazepine  150 mg Per GJ tube Q6H ALONDRA     enoxaparin  40 mg Subcutaneous Q24H     fiber modular (NUTRISOURCE FIBER)  1 packet Per Feeding Tube Daily     hydrocortisone sodium succinate PF  25 mg Intravenous Q6H     insulin aspart  1-6 Units Subcutaneous Q4H     levofloxacin  500 mg Intravenous Q24H     levothyroxine  150 mcg Oral or Feeding Tube Daily     melatonin  6 mg Per NG tube At Bedtime     multivitamins w/minerals  15 mL Per Feeding Tube Daily     pantoprazole  40 mg Oral or Feeding Tube QAM AC     potassium & sodium phosphates  1 packet Oral TID     protein modular  1 packet Per Feeding Tube Q12H     sodium chloride (PF)  10 mL Intracatheter Q7 Days     [START ON 8/23/2019] testosterone cypionate  76 mg Intramuscular Q14 Days     vancomycin (VANCOCIN) IV  1,500 mg Intravenous Q12H       NaCl 75 mL/hr at 08/16/19 0804               Physical Examination:   Temp:  [97.9  F (36.6  C)-98.7  F (37.1  C)] 97.9  F (36.6  C)  Pulse:  [] 87  Heart Rate:  [53-99] 90  Resp:  [10-28] 17  BP: ()/(44-72) 123/69  SpO2:  [67 %-99 %] 95 %    Intake/Output Summary (Last 24 hours) at 8/16/2019 1024  Last data filed at 8/16/2019 0900  Gross per 24 hour   Intake 3490 ml   Output 2675 ml   Net 815 ml     Wt Readings from Last 4 Encounters:   08/15/19 75.4 kg (166 lb 3.6 oz)   08/07/19 72.6 kg (160 lb)   06/19/19 72.4 kg (159 lb 11.2 oz)   05/30/19 74.5 kg (164 lb 3.9 oz)     BP - Mean:  [] 90  Resp: 17    No lab results found in last 7  days.    GEN: no acute distress   HEENT: head ncat, sclera anicteric, OP patent, trachea midline   PULM: unlabored synchronous.  CV/COR: RRR S1S2 no gallop,  No rub, no murmur  ABD: soft nontender, hypoactive bowel sounds, no mass. G J tube in place  EXT:  Edema   warm  NEURO: grossly intact  SKIN: no obvious rash, right buttock wound  LINES: clean, dry intact         Data:   All data and imaging reviewed     ROUTINE ICU LABS (Last four results)  CMP  Recent Labs   Lab 08/16/19  0500 08/15/19  1450 08/15/19  0930 08/15/19  0419 08/14/19  2055 08/14/19  1627 08/14/19  0407 08/13/19  1750 08/13/19  0600   *  --   --  154*  --   --  156* 156* 156*   POTASSIUM 3.8 3.8 3.3* 3.2* 3.5 3.2* 3.0*  --  3.4   CHLORIDE 120*  --   --  122*  --   --  125*  --  127*   CO2 26  --   --  27  --   --  25  --  22   ANIONGAP 4  --   --  5  --   --  6  --  7   *  --   --  139*  --   --  160*  --  147*   BUN 15  --   --  13  --   --  12  --  14   CR 0.66  --   --  0.75  --   --  0.79  --  0.96   GFRESTIMATED >90  --   --  >90  --   --  >90  --  88   GFRESTBLACK >90  --   --  >90  --   --  >90  --  >90   STEVE 7.2*  --   --  6.6*  --   --  7.2*  --  7.7*   MAG 2.2  --   --  1.8 1.9  --  1.9  --  2.1   PHOS 2.8  --   --  3.6  --  2.1* 2.4* 2.2* 1.4*     CBC  Recent Labs   Lab 08/16/19  0500 08/15/19  0419 08/14/19  0407 08/13/19  0600   WBC 6.6 9.5 9.7 23.3*   RBC 3.37* 3.38* 3.51* 4.02*   HGB 9.2* 9.6* 10.1* 11.6*   HCT 30.5* 29.9* 31.3* 35.6*   MCV 91 89 89 89   MCH 27.3 28.4 28.8 28.9   MCHC 30.2* 32.1 32.3 32.6   RDW 15.9* 15.7* 15.6* 15.2*   * 122* 117* 186     INRNo lab results found in last 7 days.  Arterial Blood GasNo lab results found in last 7 days.    All cultures:  Recent Labs   Lab 08/12/19  2300 08/12/19  1521 08/12/19  1405 08/12/19  1314   CULT Methicillin resistant Staphylococcus aureus (MRSA) isolated* >10 Squamous epithelial cells/low power field indicates oral contamination. Please   recollect.  *   Canceled, Test credited  Notification of test cancellation was given to  Katie Jones RN, @1934 08/12/19.DH.   No growth after 4 days  >100,000 colonies/mL  Lactose fermenting gram negative rods  *  >100,000 colonies/mL  Non lactose fermenting gram negative rods  *  >100,000 colonies/mL  Gram positive cocci  Strain 1  *  10,000 to 50,000 colonies/mL  Gram positive cocci  Strain 2  *  Susceptibility testing not routinely done  Susceptibility upon request   No growth after 4 days     Recent Results (from the past 24 hour(s))   IR PICC Exchange Left    Narrative    INTERVENTIONAL RADIOGRAPHY  PICC EXCHANGE LEFT 8/15/2019 1:50 PM  LEFT UPPER EXTREMITY VENOGRAM;  ANGIOPLASTY OF THE LEFT BRACHIOCEPHALIC VEIN.    HISTORY:  Malpositioned PICC. Unable to be passed past the left  brachiocephalic vein.    COMPARISON: None.    FINDINGS: After obtaining informed consent, the patient was placed in  a supine position on the fluoroscopy table. The left arm including  external portions of existing PICC were prepped and draped in the  usual sterile manner. 1% lidocaine was injected for local anesthesia.  A wire was passed through the existing PICC however, it would could  not be threaded centrally across the left brachiocephalic vein.  Therefore, contrast was injected. 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 Existing PICC was removed. A new 5 Turkmen Peel-Away sheath was  placed. Attempts to pass a new PICC across the aforementioned occluded  segment of the left brachiocephalic vein were unsuccessful. Therefore,  a wire was able to be maneuvered across this portion of the vein. The  brachiocephalic vein was dilated with a 5 mm balloon. Subsequently, a  5 Turkmen double-lumen PICC was able to be advanced across the left  brachiocephalic vein into the proximal superior vena cava. A  fluoroscopic image was saved to document positioning. All ports were  aspirated and  flushed with saline.    I determined this patient to be an appropriate candidate for the  planned sedation and procedure and reassessed the patient immediately  prior to sedation and procedure. Moderate intravenous conscious  sedation was supervised by me. The patient was independently monitored  by a registered nurse assigned to the Department of radiology using  automated blood pressure, EKG and pulse oximetry. The patient  tolerated the procedure well. There were no immediate postprocedure  complications. The patient's vital signs were monitored by radiology  nursing staff under my supervision and remained stable throughout the  study. Radiation dose for this scan was reduced using automated  exposure control, adjustment of the mA and/or kV according to patient  size, or iterative reconstruction technique.    Fluoroscopy time: 5.2 minutes    Total fluoroscopy dose: 20.11 mGy Air Kerma    Contrast: 10 cc Isovue      Impression    IMPRESSION: Venogram of the left brachiocephalic vein showed nearly  occlusive thrombus in the left brachiocephalic vein. It was macerated  with balloon angioplasty as described above to allow for passage of a  left-sided PICC.   XR Abdomen 2 Views    Narrative    ABDOMEN TWO VIEWS 8/15/2019 4:19 PM     HISTORY: Projectile vomiting.    COMPARISON: 3/10/2019      Impression    IMPRESSION: Contrast material was injected into the gastrostomy tube  just prior to imaging. The contrast passes into the stomach retrograde  and anterograde into the fourth portion of the duodenum. No bowel  dilatation. Staple line in the right abdomen. No free air.    VENICE KENNEDY MD         Billing: This patient is critically ill: No.     Discharged today to home with home nursing care

## 2019-08-19 ENCOUNTER — HOME INFUSION (PRE-WILLOW HOME INFUSION) (OUTPATIENT)
Dept: PHARMACY | Facility: CLINIC | Age: 59
End: 2019-08-19

## 2019-08-19 NOTE — PROGRESS NOTES
This is a recent snapshot of the patient's [facility] medical record.  For current drug dose and complete information and questions, call [phone] or In Basket pool, fv home infusion ([id number])  CSN Number:  [id number]

## 2019-08-19 NOTE — PROGRESS NOTES
This is a recent snapshot of the patient's [facility] medical record.  For current drug dose and complete information and questions, call [phone] or In Basket pool, fv home infusion ([id number])  CSN Number:  [id number]

## 2019-08-20 ENCOUNTER — HOME INFUSION (PRE-WILLOW HOME INFUSION) (OUTPATIENT)
Dept: PHARMACY | Facility: CLINIC | Age: 59
End: 2019-08-20

## 2019-08-20 NOTE — PROGRESS NOTES
This is a recent snapshot of the patient's [facility] medical record.  For current drug dose and complete information and questions, call [phone] or In Basket pool, fv home infusion ([id number])  CSN Number:  [id number]

## 2019-08-21 LAB — INTERPRETATION ECG - MUSE: NORMAL

## 2019-08-21 NOTE — PROGRESS NOTES
This is a recent snapshot of the patient's [facility] medical record.  For current drug dose and complete information and questions, call [phone] or In Basket pool, fv home infusion ([id number])  CSN Number:  [id number]

## 2019-09-13 ENCOUNTER — HOSPITAL ENCOUNTER (OUTPATIENT)
Facility: CLINIC | Age: 59
Setting detail: OBSERVATION
Discharge: HOME HEALTH | DRG: 871 | End: 2019-09-15
Attending: EMERGENCY MEDICINE | Admitting: INTERNAL MEDICINE
Payer: MEDICARE

## 2019-09-13 ENCOUNTER — APPOINTMENT (OUTPATIENT)
Dept: GENERAL RADIOLOGY | Facility: CLINIC | Age: 59
DRG: 871 | End: 2019-09-13
Attending: EMERGENCY MEDICINE
Payer: MEDICARE

## 2019-09-13 DIAGNOSIS — R65.10 SIRS (SYSTEMIC INFLAMMATORY RESPONSE SYNDROME) (H): Primary | ICD-10-CM

## 2019-09-13 DIAGNOSIS — R50.9 FEVER, UNSPECIFIED FEVER CAUSE: ICD-10-CM

## 2019-09-13 LAB
ALBUMIN SERPL-MCNC: 3.4 G/DL (ref 3.4–5)
ALBUMIN UR-MCNC: NEGATIVE MG/DL
ALP SERPL-CCNC: 113 U/L (ref 40–150)
ALT SERPL W P-5'-P-CCNC: 30 U/L (ref 0–70)
ANION GAP SERPL CALCULATED.3IONS-SCNC: 5 MMOL/L (ref 3–14)
APPEARANCE UR: CLEAR
AST SERPL W P-5'-P-CCNC: 19 U/L (ref 0–45)
BASOPHILS # BLD AUTO: 0 10E9/L (ref 0–0.2)
BASOPHILS NFR BLD AUTO: 0.2 %
BILIRUB SERPL-MCNC: 0.2 MG/DL (ref 0.2–1.3)
BILIRUB UR QL STRIP: NEGATIVE
BUN SERPL-MCNC: 15 MG/DL (ref 7–30)
CALCIUM SERPL-MCNC: 8.8 MG/DL (ref 8.5–10.1)
CHLORIDE SERPL-SCNC: 95 MMOL/L (ref 94–109)
CO2 BLDCOV-SCNC: 24 MMOL/L (ref 21–28)
CO2 SERPL-SCNC: 30 MMOL/L (ref 20–32)
COLOR UR AUTO: YELLOW
CREAT SERPL-MCNC: 0.64 MG/DL (ref 0.66–1.25)
DIFFERENTIAL METHOD BLD: ABNORMAL
EOSINOPHIL # BLD AUTO: 0.2 10E9/L (ref 0–0.7)
EOSINOPHIL NFR BLD AUTO: 1.7 %
ERYTHROCYTE [DISTWIDTH] IN BLOOD BY AUTOMATED COUNT: 13.9 % (ref 10–15)
GFR SERPL CREATININE-BSD FRML MDRD: >90 ML/MIN/{1.73_M2}
GLUCOSE SERPL-MCNC: 76 MG/DL (ref 70–99)
GLUCOSE UR STRIP-MCNC: NEGATIVE MG/DL
HCT VFR BLD AUTO: 37.6 % (ref 40–53)
HGB BLD-MCNC: 12.6 G/DL (ref 13.3–17.7)
HGB UR QL STRIP: NEGATIVE
IMM GRANULOCYTES # BLD: 0 10E9/L (ref 0–0.4)
IMM GRANULOCYTES NFR BLD: 0.2 %
KETONES UR STRIP-MCNC: NEGATIVE MG/DL
LACTATE BLD-SCNC: 2.1 MMOL/L (ref 0.7–2.1)
LACTATE BLD-SCNC: 2.2 MMOL/L (ref 0.7–2)
LEUKOCYTE ESTERASE UR QL STRIP: NEGATIVE
LYMPHOCYTES # BLD AUTO: 1.9 10E9/L (ref 0.8–5.3)
LYMPHOCYTES NFR BLD AUTO: 14.6 %
MCH RBC QN AUTO: 28.4 PG (ref 26.5–33)
MCHC RBC AUTO-ENTMCNC: 33.5 G/DL (ref 31.5–36.5)
MCV RBC AUTO: 85 FL (ref 78–100)
MONOCYTES # BLD AUTO: 0.6 10E9/L (ref 0–1.3)
MONOCYTES NFR BLD AUTO: 4.6 %
NEUTROPHILS # BLD AUTO: 10.2 10E9/L (ref 1.6–8.3)
NEUTROPHILS NFR BLD AUTO: 78.7 %
NITRATE UR QL: NEGATIVE
NRBC # BLD AUTO: 0 10*3/UL
NRBC BLD AUTO-RTO: 0 /100
PCO2 BLDV: 42 MM HG (ref 40–50)
PH BLDV: 7.38 PH (ref 7.32–7.43)
PH UR STRIP: 8.5 PH (ref 5–7)
PLATELET # BLD AUTO: 137 10E9/L (ref 150–450)
PO2 BLDV: 20 MM HG (ref 25–47)
POTASSIUM SERPL-SCNC: 4.5 MMOL/L (ref 3.4–5.3)
PROCALCITONIN SERPL-MCNC: <0.05 NG/ML
PROT SERPL-MCNC: 8.4 G/DL (ref 6.8–8.8)
RBC # BLD AUTO: 4.43 10E12/L (ref 4.4–5.9)
RBC #/AREA URNS AUTO: <1 /HPF (ref 0–2)
SAO2 % BLDV FROM PO2: 31 %
SODIUM SERPL-SCNC: 130 MMOL/L (ref 133–144)
SOURCE: ABNORMAL
SP GR UR STRIP: 1.01 (ref 1–1.03)
UROBILINOGEN UR STRIP-MCNC: NORMAL MG/DL (ref 0–2)
WBC # BLD AUTO: 13 10E9/L (ref 4–11)
WBC #/AREA URNS AUTO: <1 /HPF (ref 0–5)

## 2019-09-13 PROCEDURE — 83605 ASSAY OF LACTIC ACID: CPT | Performed by: EMERGENCY MEDICINE

## 2019-09-13 PROCEDURE — 99220 ZZC INITIAL OBSERVATION CARE,LEVL III: CPT | Performed by: INTERNAL MEDICINE

## 2019-09-13 PROCEDURE — 84145 PROCALCITONIN (PCT): CPT | Performed by: EMERGENCY MEDICINE

## 2019-09-13 PROCEDURE — 71045 X-RAY EXAM CHEST 1 VIEW: CPT

## 2019-09-13 PROCEDURE — 82803 BLOOD GASES ANY COMBINATION: CPT

## 2019-09-13 PROCEDURE — 99285 EMERGENCY DEPT VISIT HI MDM: CPT | Mod: 25

## 2019-09-13 PROCEDURE — 80053 COMPREHEN METABOLIC PANEL: CPT | Performed by: EMERGENCY MEDICINE

## 2019-09-13 PROCEDURE — 87040 BLOOD CULTURE FOR BACTERIA: CPT | Performed by: EMERGENCY MEDICINE

## 2019-09-13 PROCEDURE — 81001 URINALYSIS AUTO W/SCOPE: CPT | Performed by: EMERGENCY MEDICINE

## 2019-09-13 PROCEDURE — 96360 HYDRATION IV INFUSION INIT: CPT

## 2019-09-13 PROCEDURE — 87633 RESP VIRUS 12-25 TARGETS: CPT | Performed by: INTERNAL MEDICINE

## 2019-09-13 PROCEDURE — 87086 URINE CULTURE/COLONY COUNT: CPT | Performed by: EMERGENCY MEDICINE

## 2019-09-13 PROCEDURE — 83605 ASSAY OF LACTIC ACID: CPT

## 2019-09-13 PROCEDURE — 96361 HYDRATE IV INFUSION ADD-ON: CPT

## 2019-09-13 PROCEDURE — 25800030 ZZH RX IP 258 OP 636: Performed by: EMERGENCY MEDICINE

## 2019-09-13 PROCEDURE — 85025 COMPLETE CBC W/AUTO DIFF WBC: CPT | Performed by: EMERGENCY MEDICINE

## 2019-09-13 PROCEDURE — 25000132 ZZH RX MED GY IP 250 OP 250 PS 637: Mod: GY | Performed by: EMERGENCY MEDICINE

## 2019-09-13 PROCEDURE — G0378 HOSPITAL OBSERVATION PER HR: HCPCS

## 2019-09-13 RX ORDER — LEVOFLOXACIN 5 MG/ML
500 INJECTION, SOLUTION INTRAVENOUS EVERY 24 HOURS
Status: DISCONTINUED | OUTPATIENT
Start: 2019-09-14 | End: 2019-09-15 | Stop reason: HOSPADM

## 2019-09-13 RX ORDER — ALBUTEROL SULFATE 0.83 MG/ML
2.5 SOLUTION RESPIRATORY (INHALATION) 4 TIMES DAILY
Status: DISCONTINUED | OUTPATIENT
Start: 2019-09-14 | End: 2019-09-15 | Stop reason: HOSPADM

## 2019-09-13 RX ORDER — GLYCOPYRROLATE 1 MG/1
1-2 TABLET ORAL 2 TIMES DAILY PRN
Status: DISCONTINUED | OUTPATIENT
Start: 2019-09-13 | End: 2019-09-14

## 2019-09-13 RX ORDER — ONDANSETRON 4 MG/1
4 TABLET, ORALLY DISINTEGRATING ORAL EVERY 6 HOURS PRN
Status: DISCONTINUED | OUTPATIENT
Start: 2019-09-13 | End: 2019-09-15 | Stop reason: HOSPADM

## 2019-09-13 RX ORDER — PROCHLORPERAZINE MALEATE 10 MG
10 TABLET ORAL EVERY 6 HOURS PRN
Status: DISCONTINUED | OUTPATIENT
Start: 2019-09-13 | End: 2019-09-14

## 2019-09-13 RX ORDER — ASPIRIN 81 MG/1
81 TABLET, CHEWABLE ORAL DAILY
Status: DISCONTINUED | OUTPATIENT
Start: 2019-09-14 | End: 2019-09-15 | Stop reason: HOSPADM

## 2019-09-13 RX ORDER — NALOXONE HYDROCHLORIDE 0.4 MG/ML
.1-.4 INJECTION, SOLUTION INTRAMUSCULAR; INTRAVENOUS; SUBCUTANEOUS
Status: DISCONTINUED | OUTPATIENT
Start: 2019-09-13 | End: 2019-09-15 | Stop reason: HOSPADM

## 2019-09-13 RX ORDER — ACETYLCYSTEINE 200 MG/ML
2 SOLUTION ORAL; RESPIRATORY (INHALATION) 4 TIMES DAILY
Status: DISCONTINUED | OUTPATIENT
Start: 2019-09-14 | End: 2019-09-15 | Stop reason: HOSPADM

## 2019-09-13 RX ORDER — CARBAMAZEPINE 100 MG/5ML
150 SUSPENSION ORAL EVERY 6 HOURS
Status: DISCONTINUED | OUTPATIENT
Start: 2019-09-13 | End: 2019-09-15 | Stop reason: HOSPADM

## 2019-09-13 RX ORDER — IBUPROFEN 600 MG/1
600 TABLET, FILM COATED ORAL EVERY 6 HOURS PRN
Status: DISCONTINUED | OUTPATIENT
Start: 2019-09-13 | End: 2019-09-14

## 2019-09-13 RX ORDER — ACETAMINOPHEN 650 MG/1
975 SUPPOSITORY RECTAL ONCE
Status: COMPLETED | OUTPATIENT
Start: 2019-09-13 | End: 2019-09-13

## 2019-09-13 RX ORDER — HYDROCORTISONE 10 MG/1
10 TABLET ORAL
Status: DISCONTINUED | OUTPATIENT
Start: 2019-09-14 | End: 2019-09-14

## 2019-09-13 RX ORDER — ACETAMINOPHEN 500 MG
1000 TABLET ORAL EVERY 6 HOURS PRN
Status: DISCONTINUED | OUTPATIENT
Start: 2019-09-13 | End: 2019-09-14

## 2019-09-13 RX ORDER — ACETAMINOPHEN 650 MG/1
650 SUPPOSITORY RECTAL EVERY 4 HOURS PRN
Status: DISCONTINUED | OUTPATIENT
Start: 2019-09-13 | End: 2019-09-15 | Stop reason: HOSPADM

## 2019-09-13 RX ORDER — SODIUM CHLORIDE, SODIUM LACTATE, POTASSIUM CHLORIDE, CALCIUM CHLORIDE 600; 310; 30; 20 MG/100ML; MG/100ML; MG/100ML; MG/100ML
1000 INJECTION, SOLUTION INTRAVENOUS CONTINUOUS
Status: DISCONTINUED | OUTPATIENT
Start: 2019-09-13 | End: 2019-09-15

## 2019-09-13 RX ORDER — HYDROCORTISONE 20 MG/1
20 TABLET ORAL EVERY MORNING
Status: DISCONTINUED | OUTPATIENT
Start: 2019-09-14 | End: 2019-09-14

## 2019-09-13 RX ORDER — LEVOTHYROXINE SODIUM 75 UG/1
150 TABLET ORAL EVERY MORNING
Status: DISCONTINUED | OUTPATIENT
Start: 2019-09-14 | End: 2019-09-14

## 2019-09-13 RX ORDER — SODIUM CHLORIDE 9 MG/ML
INJECTION, SOLUTION INTRAVENOUS CONTINUOUS
Status: DISCONTINUED | OUTPATIENT
Start: 2019-09-13 | End: 2019-09-15

## 2019-09-13 RX ORDER — PROCHLORPERAZINE 25 MG
25 SUPPOSITORY, RECTAL RECTAL EVERY 12 HOURS PRN
Status: DISCONTINUED | OUTPATIENT
Start: 2019-09-13 | End: 2019-09-14

## 2019-09-13 RX ORDER — ONDANSETRON 2 MG/ML
4 INJECTION INTRAMUSCULAR; INTRAVENOUS EVERY 6 HOURS PRN
Status: DISCONTINUED | OUTPATIENT
Start: 2019-09-13 | End: 2019-09-15 | Stop reason: HOSPADM

## 2019-09-13 RX ORDER — ACETAMINOPHEN 325 MG/1
650 TABLET ORAL EVERY 4 HOURS PRN
Status: DISCONTINUED | OUTPATIENT
Start: 2019-09-13 | End: 2019-09-14

## 2019-09-13 RX ORDER — LANOLIN ALCOHOL/MO/W.PET/CERES
6 CREAM (GRAM) TOPICAL AT BEDTIME
Status: DISCONTINUED | OUTPATIENT
Start: 2019-09-13 | End: 2019-09-15 | Stop reason: HOSPADM

## 2019-09-13 RX ADMIN — SODIUM CHLORIDE, POTASSIUM CHLORIDE, SODIUM LACTATE AND CALCIUM CHLORIDE 1000 ML: 600; 310; 30; 20 INJECTION, SOLUTION INTRAVENOUS at 23:19

## 2019-09-13 RX ADMIN — SODIUM CHLORIDE, POTASSIUM CHLORIDE, SODIUM LACTATE AND CALCIUM CHLORIDE 1000 ML: 600; 310; 30; 20 INJECTION, SOLUTION INTRAVENOUS at 20:14

## 2019-09-13 RX ADMIN — ACETAMINOPHEN 975 MG: 650 SUPPOSITORY RECTAL at 20:12

## 2019-09-13 ASSESSMENT — MIFFLIN-ST. JEOR
SCORE: 1535.24
SCORE: 1586.49

## 2019-09-13 ASSESSMENT — ENCOUNTER SYMPTOMS
VOMITING: 1
FEVER: 1

## 2019-09-13 NOTE — PROGRESS NOTES
Critical Care Progress Note      8/14/2019    Name: Keyon Farias MRN#: [id number]   Age: 59 year old YOB: 1960     Hsptl Day# 4  ICU DAY #    MV DAY #             Problem List:   Active Problems:    Septic shock (H)           Summary/Hospital Course:   59 year old male with prior TBI now with chronic deficits including aphasia and cognitive deficits who now presented to the ED for fever and coughing.  He apparently has chronic aphasia and his mother who provided history to the ED physician has had recent fever and cough.  He also recent had his GJ tube replaced within the last week.   In the ED he was found to be hypotensive which was recalcitrant to IV fluid resuscitation and had a lactate in the 3s.  A central line was placed and he was started on norepi.       Assessment and plan :      Keyon Farias IS a 59 year old male admitted on 8/12/2019 for septic shock   I have personally reviewed the daily labs, imaging studies, cultures and discussed the case with referring physician and consulting physicians.      My assessment and plan by system for this patient is as follows:     Neurology/Psychiatry:   1. TBI   2. Analgesia: Tylenol, oxycodone PRN  3. Seizures: carbamazepine, brivaracetam  Plan  Continue current medications     Cardiovascular:   1.Hemodynamics -currently on pressors   2.Rhythm - NSR  3. Ischemia - none  Plan   Continue to monitor     Pulmonary/Ventilator Management:   1. Airway not intubated   2. Oxygenation/ventilation/mechanics  Initially considered to CAP versus pneumonitis but septic shock likely due to UTI  Nurse has observed desaturation episodes overnight to the 60s  Plan  - Consider CPAP at night     GI and Nutrition :   1. GJ feedings. History of dysphagia  2. Vomiting projectile per nursing: Will do an abdominal series, abdomen remains soft and non tender.  Plan  - Evaluate the GJ tube position     Renal/Fluids/Electrolytes:   1. Normal creatinine today initial Creat 1.25  2.  Electrolyte abnormality: hypokalemia  3. Acid/base status: normalized  4. Volume status: euvolemic  Plan  -  monitor function and electrolytes as needed with replacement per ICU protocols.  - generally avoid nephrotoxic agents such as NSAID, IV contrast unless specifically required  - adjust medications as needed for renal clearance  - follow I/O's as appropriate.     Infectious Disease:   1. Multiple gram positive and gram negative bacteria noted   2. MRSA positive of nares  3.Cdiff negative done on 8/12/2019  4. Urine: lactose fermenting gram negative rods  Gram positive cocci, non lactose fermenting gram negative rods:   Ecoli growing pan sensitive  Pseudomonas aeruginosa resistant to cefepime, ceftazidime, meropenam, piptazo  Plan  - stop piptazo and cipraflox  Continue vanc and start levoquin (due to transition necessary from cipraflox to levoquin for home antibiotics)     Endocrine:   1.  Stress induced hyperglycemia  2. 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 On testosterone  Plan  - ICU insulin protocol, goal sugar <180        Hematology/Oncology:   1 Anemia, no signs, symptoms of active blood loss     Plan  - Continue to monitor        MSKL/Rheum:  1. Right buttock wound present on admission     Plan  - Wound nursing consult done        IV/Access:   1. Venous access - central line   2. Arterial access -  None  3.  Plan  - central access required and necessary        ICU Prophylaxis:   1. DVT: Hep Subq/ LMWH/mechanical  2. VAP: HOB 30 degrees, chlorhexidine rinse  3. Stress Ulcer: PPI/H2 blocker  4. Restraints: Nonviolent soft two point restraints required and necessary for patient safety and continued cares and good effect as patient continues to pull at necessary lines, tubes despite education and distraction. Will readdress daily.   5. Wound care - per unit routine   6. Feeding - GJ   7. Family Update:spoke to the mother  8. Disposition - ICU           Key goals for next 24 hours:   1. Evaluate  the GJ tube as a probable cause for projectile vomiting                 Physical Examination:      No intake or output data in the 24 hours ending 09/13/19 1722  Wt Readings from Last 4 Encounters:   08/15/19 75.4 kg (166 lb 3.6 oz)   08/07/19 72.6 kg (160 lb)   06/19/19 72.4 kg (159 lb 11.2 oz)   05/30/19 74.5 kg (164 lb 3.9 oz)        No data recorded  No lab results found in last 7 days.    GEN: no acute distress   HEENT: head ncat, sclera anicteric, OP patent, trachea midline   PULM: unlabored synchronous , clear anteriorly    CV/COR: RRR S1S2 no gallop,  No rub, no murmur  ABD: soft nontender, hypoactive bowel sounds, no mass  EXT:  Edema   warm  NEURO: sedated and agitated  SKIN: no obvious rash  LINES: clean, dry intact         Data:   All data and imaging reviewed     Billing: This patient is critically ill: NO Total  care time today 30 min.

## 2019-09-13 NOTE — LETTER
Transition Communication Hand-off for Care Transitions to Next Level of Care Provider    Name: Keyon Farias  : 1960  MRN #: [id number]  Primary Care Provider: Carlos Gomez  Primary Care MD Name: Carlos Gomez  Primary Clinic: [address] [address]  Primary Care Clinic Name: [facility]  Reason for Hospitalization:  SIRS (systemic inflammatory response syndrome) (H)  Admit Date/Time: 2019  7:47 PM  Discharge Date: 9/15/19  Payor Source: Payor: MEDICARE / Plan: MEDICARE / Product Type: Medicare /     Readmission Assessment Measure (HANS) Risk Score/category: high           Reason for Communication Hand-off Referral: Fragility  Difficulty understanding plan of care  Multiple providers/specialties    Discharge Plan: Patient admitted for fevers.  Patient has PMH of TBI w/c bound TF with continued aspiration.  Patient was initiated on antibiotic regimen through TF and recommended to follow up with PCP.  Patient to discharge to home with primary care giver and guardian (mother) and resumption of prior to admission PCA/Homecare RN coverage.  No further needs identified.         Concern for non-adherence with plan of care:   Y/N n  Discharge Needs Assessment:  Needs      Most Recent Value   # of Referrals Placed by CTS  Communication hand-offs to next level of Care Providers          Already enrolled in Tele-monitoring program and name of program:  n/a  Follow-up specialty is recommended: Yes    Follow-up plan:    Future Appointments   Date Time Provider Department Center   2019 12:00 PM [id number] [facility]   2019 12:30 PM  PFL B [facility]CPFT [facility]   2019  1:40 PM Jennie Bermudez MD [facility]       Any outstanding tests or procedures:              Key Recommendations:  Discharged on antibiotic regimen through feeding tube and return to home with previous PCA/RN assistance to home.  Recommended follow up with PCP (mother to call and schedule) and to keep  previously scheduled follow up with [facility].     Ingris Scott RN    AVS/Discharge Summary is the source of truth; this is a helpful guide for improved communication of patient story

## 2019-09-14 ENCOUNTER — HOME INFUSION (PRE-WILLOW HOME INFUSION) (OUTPATIENT)
Dept: PHARMACY | Facility: CLINIC | Age: 59
End: 2019-09-14

## 2019-09-14 LAB
ANION GAP SERPL CALCULATED.3IONS-SCNC: 4 MMOL/L (ref 3–14)
BUN SERPL-MCNC: 12 MG/DL (ref 7–30)
CALCIUM SERPL-MCNC: 7.6 MG/DL (ref 8.5–10.1)
CHLORIDE SERPL-SCNC: 98 MMOL/L (ref 94–109)
CO2 SERPL-SCNC: 27 MMOL/L (ref 20–32)
CREAT SERPL-MCNC: 0.75 MG/DL (ref 0.66–1.25)
ERYTHROCYTE [DISTWIDTH] IN BLOOD BY AUTOMATED COUNT: 14.1 % (ref 10–15)
FLUAV H1 2009 PAND RNA SPEC QL NAA+PROBE: NEGATIVE
FLUAV H1 RNA SPEC QL NAA+PROBE: NEGATIVE
FLUAV H3 RNA SPEC QL NAA+PROBE: NEGATIVE
FLUAV RNA SPEC QL NAA+PROBE: NEGATIVE
FLUBV RNA SPEC QL NAA+PROBE: NEGATIVE
GFR SERPL CREATININE-BSD FRML MDRD: >90 ML/MIN/{1.73_M2}
GLUCOSE SERPL-MCNC: 84 MG/DL (ref 70–99)
HADV DNA SPEC QL NAA+PROBE: NEGATIVE
HADV DNA SPEC QL NAA+PROBE: NEGATIVE
HCT VFR BLD AUTO: 32 % (ref 40–53)
HGB BLD-MCNC: 10.9 G/DL (ref 13.3–17.7)
HMPV RNA SPEC QL NAA+PROBE: NEGATIVE
HPIV1 RNA SPEC QL NAA+PROBE: NEGATIVE
HPIV2 RNA SPEC QL NAA+PROBE: NEGATIVE
HPIV3 RNA SPEC QL NAA+PROBE: NEGATIVE
MAGNESIUM SERPL-MCNC: 1.7 MG/DL (ref 1.6–2.3)
MCH RBC QN AUTO: 28.8 PG (ref 26.5–33)
MCHC RBC AUTO-ENTMCNC: 34.1 G/DL (ref 31.5–36.5)
MCV RBC AUTO: 84 FL (ref 78–100)
MICROBIOLOGIST REVIEW: NORMAL
PHOSPHATE SERPL-MCNC: 2.8 MG/DL (ref 2.5–4.5)
PLATELET # BLD AUTO: 112 10E9/L (ref 150–450)
POTASSIUM SERPL-SCNC: 4.2 MMOL/L (ref 3.4–5.3)
RBC # BLD AUTO: 3.79 10E12/L (ref 4.4–5.9)
RHINOVIRUS RNA SPEC QL NAA+PROBE: NEGATIVE
RSV RNA SPEC QL NAA+PROBE: NEGATIVE
RSV RNA SPEC QL NAA+PROBE: NEGATIVE
SODIUM SERPL-SCNC: 129 MMOL/L (ref 133–144)
SPECIMEN SOURCE: NORMAL
WBC # BLD AUTO: 16.2 10E9/L (ref 4–11)

## 2019-09-14 PROCEDURE — 25800030 ZZH RX IP 258 OP 636: Performed by: INTERNAL MEDICINE

## 2019-09-14 PROCEDURE — 80048 BASIC METABOLIC PNL TOTAL CA: CPT | Performed by: INTERNAL MEDICINE

## 2019-09-14 PROCEDURE — 96374 THER/PROPH/DIAG INJ IV PUSH: CPT

## 2019-09-14 PROCEDURE — 83735 ASSAY OF MAGNESIUM: CPT | Performed by: INTERNAL MEDICINE

## 2019-09-14 PROCEDURE — 40000275 ZZH STATISTIC RCP TIME EA 10 MIN

## 2019-09-14 PROCEDURE — 25000125 ZZHC RX 250: Performed by: INTERNAL MEDICINE

## 2019-09-14 PROCEDURE — G0378 HOSPITAL OBSERVATION PER HR: HCPCS

## 2019-09-14 PROCEDURE — 94640 AIRWAY INHALATION TREATMENT: CPT

## 2019-09-14 PROCEDURE — 25000128 H RX IP 250 OP 636: Performed by: INTERNAL MEDICINE

## 2019-09-14 PROCEDURE — 25000132 ZZH RX MED GY IP 250 OP 250 PS 637: Mod: GY | Performed by: INTERNAL MEDICINE

## 2019-09-14 PROCEDURE — 99225 ZZC SUBSEQUENT OBSERVATION CARE,LEVEL II: CPT | Performed by: PHYSICIAN ASSISTANT

## 2019-09-14 PROCEDURE — 27210429 ZZH NUTRITION PRODUCT INTERMEDIATE LITER

## 2019-09-14 PROCEDURE — 36415 COLL VENOUS BLD VENIPUNCTURE: CPT | Performed by: INTERNAL MEDICINE

## 2019-09-14 PROCEDURE — 85027 COMPLETE CBC AUTOMATED: CPT | Performed by: INTERNAL MEDICINE

## 2019-09-14 PROCEDURE — 84100 ASSAY OF PHOSPHORUS: CPT | Performed by: INTERNAL MEDICINE

## 2019-09-14 PROCEDURE — 99207 ZZC CDG-CODE CATEGORY CHANGED: CPT | Performed by: PHYSICIAN ASSISTANT

## 2019-09-14 PROCEDURE — 96361 HYDRATE IV INFUSION ADD-ON: CPT

## 2019-09-14 PROCEDURE — 94640 AIRWAY INHALATION TREATMENT: CPT | Mod: 76

## 2019-09-14 RX ORDER — LEVOTHYROXINE SODIUM 150 UG/1
150 TABLET ORAL EVERY MORNING
COMMUNITY
End: 2021-03-17

## 2019-09-14 RX ORDER — POLYETHYLENE GLYCOL 3350 17 G/17G
1 POWDER, FOR SOLUTION ORAL
Status: ON HOLD | COMMUNITY
End: 2019-09-14

## 2019-09-14 RX ORDER — PROCHLORPERAZINE 25 MG
25 SUPPOSITORY, RECTAL RECTAL EVERY 12 HOURS PRN
Status: DISCONTINUED | OUTPATIENT
Start: 2019-09-14 | End: 2019-09-15 | Stop reason: HOSPADM

## 2019-09-14 RX ORDER — HYDROCORTISONE 10 MG/1
10 TABLET ORAL
Status: DISCONTINUED | OUTPATIENT
Start: 2019-09-14 | End: 2019-09-15 | Stop reason: HOSPADM

## 2019-09-14 RX ORDER — PETROLATUM 0.51 G/G
OINTMENT TOPICAL 2 TIMES DAILY PRN
COMMUNITY
End: 2022-06-14

## 2019-09-14 RX ORDER — GLYCOPYRROLATE 1 MG/1
1-2 TABLET ORAL 2 TIMES DAILY PRN
Status: DISCONTINUED | OUTPATIENT
Start: 2019-09-14 | End: 2019-09-15 | Stop reason: HOSPADM

## 2019-09-14 RX ORDER — ACETAMINOPHEN 500 MG
1000 TABLET ORAL EVERY 6 HOURS PRN
Status: DISCONTINUED | OUTPATIENT
Start: 2019-09-14 | End: 2019-09-14

## 2019-09-14 RX ORDER — GUAR GUM
PACKET (EA) ORAL
Status: ON HOLD | COMMUNITY
End: 2019-09-14

## 2019-09-14 RX ORDER — LEVOTHYROXINE SODIUM 137 UG/1
137 TABLET ORAL
Status: ON HOLD | COMMUNITY
End: 2019-09-14

## 2019-09-14 RX ORDER — PROCHLORPERAZINE MALEATE 10 MG
10 TABLET ORAL EVERY 6 HOURS PRN
Status: DISCONTINUED | OUTPATIENT
Start: 2019-09-14 | End: 2019-09-15 | Stop reason: HOSPADM

## 2019-09-14 RX ORDER — AMINO AC/PROTEIN HYDR/WHEY PRO 10G-100/30
1 LIQUID (ML) ORAL 2 TIMES DAILY
Status: DISCONTINUED | OUTPATIENT
Start: 2019-09-14 | End: 2019-09-15 | Stop reason: HOSPADM

## 2019-09-14 RX ORDER — LEVOTHYROXINE SODIUM 75 UG/1
150 TABLET ORAL
Status: DISCONTINUED | OUTPATIENT
Start: 2019-09-14 | End: 2019-09-15 | Stop reason: HOSPADM

## 2019-09-14 RX ORDER — LEVOTHYROXINE SODIUM 150 UG/1
150 TABLET ORAL
Status: DISCONTINUED | OUTPATIENT
Start: 2019-09-14 | End: 2019-09-14

## 2019-09-14 RX ORDER — IBUPROFEN 100 MG/5ML
600 SUSPENSION, ORAL (FINAL DOSE FORM) ORAL EVERY 6 HOURS PRN
Status: DISCONTINUED | OUTPATIENT
Start: 2019-09-14 | End: 2019-09-15 | Stop reason: HOSPADM

## 2019-09-14 RX ORDER — IPRATROPIUM BROMIDE AND ALBUTEROL SULFATE 2.5; .5 MG/3ML; MG/3ML
1 SOLUTION RESPIRATORY (INHALATION) EVERY 4 HOURS PRN
COMMUNITY
End: 2019-12-16

## 2019-09-14 RX ORDER — GUAR GUM
1 PACKET (EA) ORAL DAILY
Status: DISCONTINUED | OUTPATIENT
Start: 2019-09-14 | End: 2019-09-15 | Stop reason: HOSPADM

## 2019-09-14 RX ORDER — METOCLOPRAMIDE HYDROCHLORIDE 5 MG/5ML
5 SOLUTION ORAL 2 TIMES DAILY
Status: ON HOLD | COMMUNITY
End: 2020-08-31

## 2019-09-14 RX ORDER — PETROLATUM 0.51 G/G
OINTMENT TOPICAL
Status: ON HOLD | COMMUNITY
End: 2019-09-14

## 2019-09-14 RX ORDER — HYDROCORTISONE 20 MG/1
20 TABLET ORAL EVERY MORNING
Status: DISCONTINUED | OUTPATIENT
Start: 2019-09-15 | End: 2019-09-15 | Stop reason: HOSPADM

## 2019-09-14 RX ADMIN — BRIVARACETAM 100 MG: 50 TABLET, FILM COATED ORAL at 08:59

## 2019-09-14 RX ADMIN — ACETYLCYSTEINE 2 ML: 200 SOLUTION ORAL; RESPIRATORY (INHALATION) at 12:05

## 2019-09-14 RX ADMIN — POTASSIUM & SODIUM PHOSPHATES POWDER PACK 280-160-250 MG 1 PACKET: 280-160-250 PACK at 14:27

## 2019-09-14 RX ADMIN — SODIUM CHLORIDE: 9 INJECTION, SOLUTION INTRAVENOUS at 12:01

## 2019-09-14 RX ADMIN — ALBUTEROL SULFATE 2.5 MG: 2.5 SOLUTION RESPIRATORY (INHALATION) at 08:01

## 2019-09-14 RX ADMIN — ACETYLCYSTEINE 2 ML: 200 SOLUTION ORAL; RESPIRATORY (INHALATION) at 20:47

## 2019-09-14 RX ADMIN — ALBUTEROL SULFATE 2.5 MG: 2.5 SOLUTION RESPIRATORY (INHALATION) at 12:04

## 2019-09-14 RX ADMIN — LEVOTHYROXINE SODIUM 150 MCG: 75 TABLET ORAL at 14:27

## 2019-09-14 RX ADMIN — BRIVARACETAM 100 MG: 50 TABLET, FILM COATED ORAL at 01:34

## 2019-09-14 RX ADMIN — MELATONIN TAB 3 MG 6 MG: 3 TAB at 21:15

## 2019-09-14 RX ADMIN — CARBAMAZEPINE 150 MG: 100 SUSPENSION ORAL at 06:27

## 2019-09-14 RX ADMIN — LEVOFLOXACIN 500 MG: 5 INJECTION, SOLUTION INTRAVENOUS at 01:08

## 2019-09-14 RX ADMIN — HYDROCORTISONE 10 MG: 10 TABLET ORAL at 16:13

## 2019-09-14 RX ADMIN — ALBUTEROL SULFATE 2.5 MG: 2.5 SOLUTION RESPIRATORY (INHALATION) at 20:46

## 2019-09-14 RX ADMIN — POTASSIUM & SODIUM PHOSPHATES POWDER PACK 280-160-250 MG 1 PACKET: 280-160-250 PACK at 08:59

## 2019-09-14 RX ADMIN — ASPIRIN 81 MG 81 MG: 81 TABLET ORAL at 08:59

## 2019-09-14 RX ADMIN — MELATONIN TAB 3 MG 6 MG: 3 TAB at 01:08

## 2019-09-14 RX ADMIN — POTASSIUM & SODIUM PHOSPHATES POWDER PACK 280-160-250 MG 1 PACKET: 280-160-250 PACK at 21:15

## 2019-09-14 RX ADMIN — ALBUTEROL SULFATE 2.5 MG: 2.5 SOLUTION RESPIRATORY (INHALATION) at 17:25

## 2019-09-14 RX ADMIN — ACETYLCYSTEINE 2 ML: 200 SOLUTION ORAL; RESPIRATORY (INHALATION) at 17:25

## 2019-09-14 RX ADMIN — CARBAMAZEPINE 150 MG: 100 SUSPENSION ORAL at 14:26

## 2019-09-14 RX ADMIN — ENOXAPARIN SODIUM 40 MG: 40 INJECTION SUBCUTANEOUS at 09:32

## 2019-09-14 RX ADMIN — ACETYLCYSTEINE 2 ML: 200 SOLUTION ORAL; RESPIRATORY (INHALATION) at 08:02

## 2019-09-14 RX ADMIN — CARBAMAZEPINE 150 MG: 100 SUSPENSION ORAL at 01:08

## 2019-09-14 RX ADMIN — SODIUM CHLORIDE: 9 INJECTION, SOLUTION INTRAVENOUS at 01:07

## 2019-09-14 RX ADMIN — BRIVARACETAM 100 MG: 10 SOLUTION ORAL at 21:15

## 2019-09-14 RX ADMIN — HYDROCORTISONE 20 MG: 20 TABLET ORAL at 08:59

## 2019-09-14 RX ADMIN — CARBAMAZEPINE 150 MG: 100 SUSPENSION ORAL at 18:52

## 2019-09-14 RX ADMIN — PANTOPRAZOLE SODIUM 40 MG: 40 TABLET, DELAYED RELEASE ORAL at 11:48

## 2019-09-14 ASSESSMENT — COLUMBIA-SUICIDE SEVERITY RATING SCALE - C-SSRS
2. HAVE YOU ACTUALLY HAD ANY THOUGHTS OF KILLING YOURSELF IN THE PAST MONTH?: OTHER (SEE COMMENTS)
1. IN THE PAST MONTH, HAVE YOU WISHED YOU WERE DEAD OR WISHED YOU COULD GO TO SLEEP AND NOT WAKE UP?: OTHER (SEE COMMENTS)
IS THE PATIENT NOT ABLE TO COMPLETE C-SSRS: UNABLE TO VERBALIZE

## 2019-09-14 NOTE — ED NOTES
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  Yes         If yes, is there guardianship documents in the Epic \"Code/ACP\" activity?  yes         Guardian Notified?  Yes  [place] - Suicide Severity Rating Scale Completed?  Yes  If yes, what color did the patient score?  N/A    Patient Report: Initial Complaint: fevers  Focused Assessment: pt was sent in from home for a noted fever of 100.4. We got a rectal temp of 104.1. Pt was given rectal tylenol and came down to 103.4. CXR and urine look clear, unknown infection. Pt's mother requests pt to come into hospital until fevers are lower.   Tests Performed: CXR, labs, UA  Abnormal Results:   Labs Ordered and Resulted from Time of ED Arrival Up to the Time of Departure from the ED   CBC WITH PLATELETS DIFFERENTIAL - Abnormal; Notable for the following components:       Result Value    WBC 13.0 (*)     Hemoglobin 12.6 (*)     Hematocrit 37.6 (*)     Platelet Count 137 (*)     Absolute Neutrophil 10.2 (*)     All other components within normal limits "   COMPREHENSIVE METABOLIC PANEL - Abnormal; Notable for the following components:    Sodium 130 (*)     Creatinine 0.64 (*)     All other components within normal limits   LACTIC ACID WHOLE BLOOD - Abnormal; Notable for the following components:    Lactic Acid 2.2 (*)     All other components within normal limits   ROUTINE UA WITH MICROSCOPIC - Abnormal; Notable for the following components:    pH Urine 8.5 (*)     All other components within normal limits   ISTAT  GASES LACTATE BOBBI POCT - Abnormal; Notable for the following components:    PO2 Venous 20 (*)     All other components within normal limits   PROCALCITONIN   PULSE OXIMETRY NURSING   CARDIAC CONTINUOUS MONITORING   URINE CULTURE AEROBIC BACTERIAL   BLOOD CULTURE   BLOOD CULTURE   RESPIRATORY VIRUS PANEL BY PCR       Treatments provided: fluids, tylenol    Family Comments: mother at bedside    OBS brochure/video discussed/provided to patient/family: Yes              Name of person given brochure if not patient: NA              Relationship to patient: NA    ED Medications:   Medications   lactated ringers BOLUS 1,000 mL (0 mLs Intravenous Stopped 9/13/19 2100)     Followed by   lactated ringers infusion (has no administration in time range)   acetaminophen (TYLENOL) Suppository 975 mg (975 mg Rectal Given 9/13/19 2012)       Drips infusing?:  No    For the majority of the shift this patient was Green.   Interventions performed were none.    Severe Sepsis OR Septic Shock Diagnosis Present: No    To be done/followed up on inpatient unit:  monitor, check temps    ED NURSE PHONE NUMBER: *[id number]

## 2019-09-14 NOTE — PROGRESS NOTES
Pt total care, non verbal with some grunts and thumbs up/down as well as facial grimace when unhappy.  Pt condom cath on in am, fell off and incontinent of urine, changed q2 as well as turned.  Pt buttocks area reddened but blanchable, barrier cream on.  Pt denies pain.  Pt iv running and rate decreased, tube feeding started late, approx 1130 am, all meds through tube.  Pt mom at bedside and updated on progress.  Pt HOB minimum 30 degrees r/t tube feed.  Pt nutational supplements not given as not available from nutrition, nurse called down to see status and was told order received and they would bring up.  Continue to monitor and POC

## 2019-09-14 NOTE — PROGRESS NOTES
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     Fever of unknown origin  SIRS criteria  Mr Farias has a complicated history of frequent infections and sepsis. Most recently admitted 8/12/19-8/16/19 for septic shock related to UTI. He was discharged on a 7 day course of IV Levaquin which he completed las month. He has not had fevers or infectious symptoms until the day of admission when he had fever of 104. Mother reports he was less interactive at that time with observed rigors. She feels his chronic sputum production may be slightly increased but otherwise has not noticed any other abnormal symptoms. UA is bland. Procalcitonin is <0.05. WBC on admission 13>16 today. Last fever 103.4 at 2300 9/13. Minimal cough observed today and patient breathing comfortably. Oxygen saturations are in high 90s on room air. Viral syndrome considered, respiratory panel is pending. Overall well appearing today.   --started on IV Levaquin on admission, while no obvious source is clear we will continue for now in light of his history. Anticipate he will discharge on oral course   --follow blood cultures  --vitals q4  --follow respiratory panel  --repeat CBC in am     Hyponatremia, mild. Na on admission 130>129. Note hypernatremia on discharge 8/16. He has history of mild hyponatremia.   --IVF for now  --recheck in am     Thrombocytopenia. Noted since 8/2019 during admission for sepsis. Stable around 120. No  evidence of bleeding.   --monitor     Recurrent aspiration pneumonia vs pneumonitis. Patient has had numerous episodes of recurrence despite tube feeding and NPO status. He is on tube feeds prior to admission. One episode of vomiting en route. Currently without obvious lung pathology. Oxygen saturations high 90s on room air, CXR unremarkable, and he is breathing comfortably.   --NPO  --continue tube feeds, nutrition following   --HOB elevated     Seizure disorder:    - continue PTA Tegretol and Briviact    Panhypopituitarism:    - continue PTA hydrocortisone 20 mg in morning and 10 mg at night     Hypothyroidism.   -  Continue PTA Synthroid      Gonadotropin, hypogonadism.   - Resume PTA testosterone injection in the outpatient setting.     History of traumatic brain injury with sequelae of aphasia and spastic paralysis  Mother is primary caregiver at home and has PCA and nursing assistance. Not interested in placement.   --reposition q2 hours    History of enlarging pancreatic tail cyst.  He has history of necrotizing pancreatitis requiring cystogastrostomy with a stent placement and subsequent removal.  During one of his recent hospitalization, a CT was noted with enlarging cyst.  This was evaluated by Dr. Davis from GI during last hospitalization, was suggested a followup CT scan in 6 months (5/2019)     DVT Prophylaxis: Pneumatic Compression Devices  Code Status: Full Code    Disposition: Expected discharge tomorrow to home if remains afebrile, hemodynamically stable.     This patient was seen and examined with Dr. Galan who agrees with the above plan.    Bindu Barillas PA-C    Interval History   Patient appears comfortable today. Denies pain. Per mother who is present at bedside seems pretty much baseline.     -Data reviewed today: I reviewed all new labs and imaging results over the last 24 hours. I personally reviewed no images or EKG's today.    Physical Exam   Temp: 98.2  F (36.8  C) Temp src:  Axillary BP: 100/44 Pulse: 66 Heart Rate: 83 Resp: 20 SpO2: 97 % O2 Device: None (Room air)    Vitals:    09/13/19 2002 09/13/19 2258   Weight: 77.1 kg (170 lb) 72 kg (158 lb 11.2 oz)     Vital Signs with Ranges  Temp:  [98.1  F (36.7  C)-104.1  F (40.1  C)] 98.2  F (36.8  C)  Pulse:  [] 66  Heart Rate:  [] 83  Resp:  [16-21] 20  BP: ()/(44-75) 100/44  SpO2:  [95 %-98 %] 97 %  I/O last 3 completed shifts:  In: 300 [P.O.:200]  Out: -     Constitutional: Awake, alert, cooperative. Subtly nods or shakes head at times. Appears comfortable.   ENT: normal cephalic, moist mucous membranes  Respiratory: Lungs primarily clear with occasional coarse breath sound, no increased work of breathing or wheezing.   Cardiovascular: Regular rate and rhythm, no murmur, no LE edema, distal pulses +2/4  GI: Normal active bowel sounds, abdomen soft, does not appear tender with palpation. gtube in place without surrounding erythema or drainage   Skin/Integumen: warm, dry  MSK:  Moves extremities spontaneously. Makes thumbs up with left hand.   Neuro:  Right hemiplegia at baseline. Largely non-verbal at baseline.     Medications     IV fluid REPLACEMENT ONLY       lactated ringers Stopped (09/14/19 0100)     sodium chloride 75 mL/hr at 09/14/19 1328       acetylcysteine  2 mL Nebulization 4x Daily     albuterol  2.5 mg Nebulization 4x Daily     aspirin  81 mg Oral or Feeding Tube Daily     Brivaracetam  100 mg Per Feeding Tube BID     carBAMazepine  150 mg Oral Q6H     enoxaparin  40 mg Subcutaneous Q24H     fiber modular (NUTRISOURCE FIBER)  1 packet Per Feeding Tube Daily     hydrocortisone  10 mg Per Feeding Tube Daily with supper     [START ON 9/15/2019] hydrocortisone  20 mg Per Feeding Tube QAM     levofloxacin  500 mg Intravenous Q24H     levothyroxine  150 mcg Per Feeding Tube QAM AC     melatonin  6 mg Per NG tube At Bedtime     pantoprazole  40 mg Per J Tube Daily     potassium & sodium phosphates  1 packet Per  Feeding Tube TID     protein modular  1 packet Per Feeding Tube BID 09 12       Data   Recent Labs   Lab 09/14/19  0636 09/13/19 2009   WBC 16.2* 13.0*   HGB 10.9* 12.6*   MCV 84 85   * 137*   * 130*   POTASSIUM 4.2 4.5   CHLORIDE 98 95   CO2 27 30   BUN 12 15   CR 0.75 0.64*   ANIONGAP 4 5   STEVE 7.6* 8.8   GLC 84 76   ALBUMIN  --  3.4   PROTTOTAL  --  8.4   BILITOTAL  --  0.2   ALKPHOS  --  113   ALT  --  30   AST  --  19       Recent Results (from the past 24 hour(s))   XR Chest Port 1 View    Narrative    CHEST ONE VIEW SUPINE 9/13/2019 8:30 PM     HISTORY: Fever 104.    COMPARISON: August 14, 2019      Impression    IMPRESSION: Stable elevated right hemidiaphragm. No gross infiltrates.    BERT ESPINOZA MD

## 2019-09-14 NOTE — PLAN OF CARE
Observation goals PRIOR TO DISCHARGE     Comments:     -Diagnostic tests and consults completed and resulted -Not met    -Vital signs normal or at patient baseline -Not met, soft B.P    -Infection is improving-partially met, temp trending down     -Returns to baseline functional status -Not met    -Safe disposition plan has been identified -Not met    Nurse to notify provider when observation goals have been met and patient is ready for discharge.        Pt is an admission of change of shift last night. Brought in by EMS from home for fever of 104 at home per mom who is the legal guardian.Low grade temp(99.3) otherwise VSS on RA, no sign of pain noted.He is alert, non verbal, only mumbles sometimes. Total care, incontinent B&B, condom catheter in place.PEG tube insitu and patent, pt is NPO, per mom, gets Jevity tube feeding from 0700 to 1900, message sent to admitting hospitalist to put in TF orders or nutrition consult.Non blanchable redness/ excoriations noted on coccyx, barrier cream applied.IVF Nacl cot@100 on rt upper arm.Plan is SW consult today.

## 2019-09-14 NOTE — PROGRESS NOTES
Therapy: Enteral Nutrition  Insurance: Medicare   Ded: $185  Met: $185    Co-Insurance: 20%  Max Out of Pocket: $0  Met: $0    Insurance: Medicaid  Ded: $0  Met: $0    Co-Insurance: 0%  Max Out of Pocket: $0  Met: $0    In reference to admission 09/13/2019 to Check Enteral Nutrition Coverage.     Please contact Intake with any questions, [phone] or In Basket pool,  Home Infusion ([phone]).

## 2019-09-14 NOTE — CONSULTS
"CLINICAL NUTRITION SERVICES  -  ASSESSMENT NOTE      Recommendations Ordered by Registered Dietitian (RD):   Resume TF Isosource 1.5 (sub for Jevity 1.5) at 100 mL/hr and run until 7 pm tonight (12 hr cycle). Nutrisource Fiber x1 pkt and Prosource x1 pkt BID added as below.   Malnutrition:   % Weight Loss:  Up to 5% in 1 month (non-severe malnutrition)  % Intake:  Unable to determine at this time with lack of history from caregiver  Subcutaneous Fat Loss:  Unable to observe at this time  Muscle Loss:  Temporal region mil depletion (8/13/19)  Fluid Retention:  None noted    Malnutrition Diagnosis: Non-Severe malnutrition  In Context of:  Chronic illness or disease        REASON FOR ASSESSMENT  Keyon Farias is a 59 year old male seen by Registered Dietitian for Provider Order - Registered Dietitian to Assess and Order TF per Medical Nutrition Therapy Protocol      NUTRITION HISTORY  - Information obtained from chart review:  - Admits with a fever. Per H&P, pt was \"somewhat delirious\" x1 day PTA. No hx of nausea or vomiting at home  - Pt well known to nutrition services from multiple admissions. TF history is as follows~  He has been on TF via GJ tube since August 2016; last tube was placed 3/8/19 - 18Fr TORY GJ tube.  - Per previous records pt's home TF regimen is as follows - Jevity 1.5 (5 to 5.5 cans daily) x 12 hours during the day to provide 6684-1183 kcal, 77-83g protein, ~260g CHO, ~27g fiber and ~900mL free water. H2O flushes 120 mL QID.  - Pt lives with his mother, she likes to keep the TF rate at no more than 100 ml/hr. He is fed during the day rather than at night so that he can be up in the chair to prevent aspiration    Water flushes have been up to 240 mL 4x per day at home. Mom has been wanting patient to gain weight, at least to 160#.    Discussed patient with RN today. Pts mother (caregiver) is not currently at bedside this AM and pt is mainly non-verbal      CURRENT NUTRITION ORDERS  Diet Order:   " "  NPO     Current Intake/Tolerance:  N/A    NUTRITION FOCUSED PHYSICAL ASSESSMENT FOR DIAGNOSING MALNUTRITION)  Completed:  No Unable at this time         Observed:    Unable to observe     Obtained from Chart/Interdisciplinary Team:  Per previous RD assessment from 8/13/2019, mild temporal wasting was noted.     ANTHROPOMETRICS  Height: 5' 9\"  Weight: 158 lbs 11.2 oz (72 kg)  Body mass index is 23.44 kg/m .  Weight Status:  Normal BMI  IBW: 73 kg   % IBW: 98%  Weight History: Weight down from last month (8# or up to 5%). Weight varies mostly between 155-164 lbs as below  Wt Readings from Last 10 Encounters:   09/13/19 72 kg (158 lb 11.2 oz)   08/15/19 75.4 kg (166 lb 3.6 oz)   08/07/19 72.6 kg (160 lb)   06/19/19 72.4 kg (159 lb 11.2 oz)   05/30/19 74.5 kg (164 lb 3.9 oz)   05/08/19 74.5 kg (164 lb 3.2 oz)   04/08/19 71.8 kg (158 lb 6.4 oz)   04/02/19 72 kg (158 lb 11.7 oz)   03/18/19 70.9 kg (156 lb 4.9 oz)   03/11/19 77.6 kg (171 lb)       LABS  Labs reviewed  Na 129 (L)  K 4.2 (NL)  Add-on Phos and Mg    MEDICATIONS  Medications reviewed  Neutra-Phos 1 pkt TID  NaCl at 100 mL/hr    ASSESSED NUTRITION NEEDS PER APPROVED PRACTICE GUIDELINES:    Dosing Weight 72 kg - current  Estimated Energy Needs: 6129-6694 kcals (30-35 Kcal/Kg)  Justification: repletion  Estimated Protein Needs: 108-129 grams protein (1.5-1.8 g pro/Kg)  Justification: preservation of lean body mass  Estimated Fluid Needs: 1 mL/Kcal  Justification: maintenance    MALNUTRITION:  % Weight Loss:  Up to 5% in 1 month (non-severe malnutrition)  % Intake:  Unable to determine at this time with lack of history from caregiver  Subcutaneous Fat Loss:  Unable to observe at this time  Muscle Loss:  Temporal region mil depletion (8/13/19)  Fluid Retention:  None noted    Malnutrition Diagnosis: Non-Severe malnutrition  In Context of:  Chronic illness or disease    NUTRITION DIAGNOSIS:  Inadequate enteral nutrition infusion related to TF on hold with recent " hospital admission as evidenced by meeting 0% estimated protein/calorie needs      NUTRITION INTERVENTIONS  Recommendations / Nutrition Prescription  Resume TF Isosource 1.5 (sub for Jevity 1.5) at 100 mL/hr and run until 7 pm tonight (12 hr cycle)    Tomorrow, begin daytime TF cycle Isosource 1.5 at 100 mL/hr x 12 hrs (7 am - 7 pm) = 1800 kcals, 82 gm pro, 18 gm fiber, 912 mL H20, 211 gm CHO  Nutrisource Fiber 1 packet per day = 15 kcals, 3 gm fiber  Prosource 1 packet BID = 80 kcals, 22 gm pro  Total (TF + Nutrisource Fiber + Prosource):  1900 kcals (26 kcal/kg and 90% energy needs), 104 gm pro (1.4 gm/kg)  Free H20 60 mL every 4 hrs (while on IVF)    If more kcals and protein desired, could consider lengthening daytime infusion time to 14 hrs (2100 kcals, 95 gm pro) for daily total 2200 kcals (31 kcal/kg), 117 gm pro (1.7 gm/kg)    Implementation  Nutrition education: No education needs assessed at this time  EN Composition, EN Schedule, Feeding Tube Flush: as above  Collaboration and Referral of Nutrition care: discussed pt and TF with RN      Nutrition Goals  TF + modulars will meet % estimated needs      MONITORING AND EVALUATION:  Progress towards goals will be monitored and evaluated per protocol and Practice Guidelines        Yanely Jefferson RD, LD  Clinical Dietitian

## 2019-09-14 NOTE — H&P
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      ASSESSMENT AND PLAN:     1. SIRS, The source most likely secondary to viral syndrome:  This is a 59-year-old male with history of recurrent aspiration pneumonias, right-sided hemiplegia, aphasia and other comorbidities, now presented with just fever, no source of infection at this time.  His white cell count is slightly high at 13,000.  Procalcitonin is actually is not elevated, it was less than 0.05, which suggests no acute BACTERIAL infection.  I think this is most likely a viral syndrome.  I will go ahead and check for respiratory virus panel by PCR, admit him, start him on IV fluids, keep him on symptomatic treatment with Tylenol and ibuprofen at this time.  Blood cultures and urine cultures and urine obtained.  Urine is normal.  We will wait for the urine culture and virus panel at this time.  We will not start him on IV antibiotic at this time.  He is on IV levofloxacin for 90 days since 08/16.  We will continue that while he is in the hospital.     2.  History of seizure disorder, on carbamazepine 150 mg every 6 hours and Brivaracetam.  We will continue with that.   3.  History of aspiration pneumonia in the past, now status post G-tube placement.  We will continue his tube feeding through the G-tube.  Aspiration precautions.   4.   GERD, on a statin.  We will continue with that.     5.  Panhypopituitarism.  He is on hydrocortisone and levothyroxine.  We will continue with that.  He gets testosterone injection every 2 weeks on Fridays.     6.  Right hemiplegia secondary to traumatic brain injury in the past.  We will have PT, OT and nursing care.     7.  Mild thrombocytopenia, most likely secondary to viral infection will improve with time.   8.  Mild hyponatremia, asymptomatic.  Keep him on IV fluids for now.   9.  Deep venous thrombosis prophylaxis with Lovenox.   10.  CODE STATUS:  Full code as per the patient's mother wishes.   11.  The case was discussed with the ER physician and the nursing staff taking care of the patient.         KANE MARS MD            DVT Prophylaxis: Enoxaparin (Lovenox) SQ  Code Status: Full Code    Disposition: Expected discharge in 1-2 days once afebrile.    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      The patient is aphasic, unable to obtain much history from him.  He lives with his mother.  His mother noticed that the patient was not feeling good and was somewhat delirious as per the patient's mother.  She took his temperature.  His temperature was 104.  She called the ambulance  and brought him here.  While en route, he did vomit once, as per the mother.  There is no history of any nausea, vomiting at home.  No coughing or shortness of breath.  No diarrhea recently.      In the ER have extensive workup done, had mildly elevated white cell count.  Chest x-ray was negative.  UA negative.  Lactic acid was mildly elevated at 2.2, got better after IV fluids.  Procalcitonin was negative as well, suppressed.  They were offered to go home with symptomatic treatment, but the mother was not comfortable with that and she wanted him to be admitted.  At this time, the Hospitalist Service is consulted for further evaluation.     Past Medical History    I have reviewed this patient's medical history and updated it with pertinent information if needed.   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  Past Surgical History:   Procedure Laterality Date     ENDOSCOPIC ULTRASOUND UPPER GASTROINTESTINAL TRACT (GI) N/A 1/30/2017    Procedure: ENDOSCOPIC ULTRASOUND, ESOPHAGOSCOPY / UPPER GASTROINTESTINAL TRACT (GI);   Surgeon: Jus Montana MD;  Location: [facility] OR     ENDOSCOPIC ULTRASOUND, ESOPHAGOSCOPY, GASTROSCOPY, DUODENOSCOPY (EGD), NECROSECTOMY N/A 2/7/2017    Procedure: ENDOSCOPIC ULTRASOUND, ESOPHAGOSCOPY, GASTROSCOPY, DUODENOSCOPY (EGD), NECROSECTOMY;  Surgeon: Jack Marcus MD;  Location:  OR     ESOPHAGOSCOPY, GASTROSCOPY, DUODENOSCOPY (EGD), COMBINED  3/13/2014    Procedure: COMBINED ESOPHAGOSCOPY, GASTROSCOPY, DUODENOSCOPY (EGD), BIOPSY SINGLE OR MULTIPLE;  gastroscopy;  Surgeon: Digna Rhodes MD;  Location: [facility]     ESOPHAGOSCOPY, GASTROSCOPY, DUODENOSCOPY (EGD), COMBINED N/A 12/6/2016    Procedure: COMBINED ESOPHAGOSCOPY, GASTROSCOPY, DUODENOSCOPY (EGD);  Surgeon: Digna Rhodes MD;  Location: [facility]     ESOPHAGOSCOPY, GASTROSCOPY, DUODENOSCOPY (EGD), COMBINED N/A 2/7/2017    Procedure: COMBINED ENDOSCOPIC ULTRASOUND, ESOPHAGOSCOPY, GASTROSCOPY, DUODENOSCOPY (EGD), FINE NEEDLE ASPIRATE/BIOPSY;  Surgeon: Too Thakur MD;  Location:  OR     HEAD & NECK SURGERY      reconstructive facial surgery following accident in 1989     IR FOLLOW UP VISIT INPATIENT  2/20/2019     IR GASTRO JEJUNOSTOMY TUBE CHANGE  12/20/2018     IR GASTRO JEJUNOSTOMY TUBE CHANGE  2/4/2019     IR GASTRO JEJUNOSTOMY TUBE CHANGE  3/8/2019     IR GASTRO JEJUNOSTOMY TUBE CHANGE  8/7/2019     IR PICC EXCHANGE LEFT  8/15/2019     LAPAROSCOPIC APPENDECTOMY  7/30/2013    Procedure: LAPAROSCOPIC APPENDECTOMY;  LAPAROSCOPIC APPENDECTOMY;  Surgeon: Manish Pierce MD;  Location:  OR     LAPAROSCOPIC ASSISTED INSERTION TUBE GASTROTOMY N/A 9/7/2016    Procedure: LAPAROSCOPIC ASSISTED INSERTION TUBE GASTROSTOMY;  Surgeon: Manish Pierce MD;  Location:  OR     ORTHOPEDIC SURGERY      right hand repair     TRACHEOSTOMY N/A 9/3/2016    Procedure: TRACHEOSTOMY;  Surgeon: João Ortiz MD;  Location:  OR     TRACHEOSTOMY N/A 12/2/2016    Procedure: TRACHEOSTOMY;  Surgeon: João rOtiz  MD Srinivas;  Location: [facility] OR     VASCULAR SURGERY         Prior to Admission Medications   Prior to Admission Medications   Prescriptions Last Dose Informant Patient Reported? Taking?   Brivaracetam (BRIVIACT) 10 MG/ML solution  Mother Yes No   Si mg by Oral or FT or NG tube route 2 times daily @0900 and 2100   Guar Gum (FIBER MODULAR, NUTRISOURCE FIBER,) packet  Mother No No   Si packet by Per G Tube route daily   Multiple Vitamins-Minerals (CENTRUM SILVER) per tablet  Mother Yes No   Sig: Take 1 tablet by mouth daily Crush and feed via j-tube   acetaminophen (TYLENOL) 500 MG tablet  Mother Yes No   Si,000 mg by Oral or FT or NG tube route every 6 hours as needed for mild pain   acetylcysteine (MUCOMYST) 20 % neb solution  Mother No No   Sig: Take 2 mLs by nebulization 4 times daily   Patient taking differently: Take 2 mLs by nebulization 4 times daily With Albuterol at 07:00, 11:00, 15:00, 19:00   albuterol (PROVENTIL) (5 MG/ML) 0.5% neb solution  Mother Yes No   Sig: Take 2.5 mg by nebulization 4 times daily 0700 1100 1500 1900 with mucomyst   aspirin (ASA) 81 MG chewable tablet  Mother Yes No   Si mg by Oral or Feeding Tube route daily At 0900   bacitracin ointment  Mother Yes No   Sig: Apply topically daily as needed for wound care To PEG site.    calcium carbonate 1250 (500 CA) MG/5ML SUSP suspension  Mother No No   Si mLs (1,250 mg) by Per J Tube route 3 times daily (with meals)   carBAMazepine (TEGRETOL) 100 MG/5ML suspension  Mother Yes No   Sig: Take 150 mg by mouth every 6 hours At 06:00, 12:00, 18:00 and 24:00 for seizures   ciprofloxacin (CIPRO) 500 MG/5ML (10%) suspension   No No   Sig: Take 5 mLs (500 mg) by mouth 2 times daily for 7 days   ferrous sulfate 220 (44 Fe) MG/5ML ELIX  Mother Yes No   Si mg by Per Feeding Tube route daily   glycopyrrolate (GLYCATE) 2 MG tablet  Mother Yes No   Sig: Take 1-2 mg by mouth 2 times daily as needed (secretions) 1/2 to 1 tablet (1 -  2 mg) up to twice daily if needed for secretions.   hydrocortisone (CORTEF) 5 MG tablet  Mother Yes No   Sig: 10 mg by Oral or FT or NG tube route daily (with dinner) At 1500   hydrocortisone (CORTEF) 5 MG tablet  Mother Yes No   Si mg by Oral or FT or NG tube route every morning    levofloxacin (LEVAQUIN) 500 MG/100ML infusion   No No   Sig: Inject 100 mLs (500 mg) into the vein every 24 hours   levothyroxine (SYNTHROID/LEVOTHROID) 150 MCG tablet  Mother Yes No   Si mcg by Per Feeding Tube route every morning Confirmed dose with Georgina -   melatonin (MELATONIN) 1 MG/ML LIQD liquid  Mother Yes No   Si mg by Per NG tube route At Bedtime    mupirocin (BACTROBAN) 2 % external ointment  Mother Yes No   Sig: Apply topically 2 times daily as needed    ondansetron (ZOFRAN) 4 MG tablet   No No   Sig: Take 1 tablet (4 mg) by mouth every 8 hours as needed for nausea   order for DME  Mother No No   Sig: Equipment being ordered: Nebulizer   pantoprazole (PROTONIX) 2 mg/mL SUSP suspension  Mother No No   Si mLs (40 mg) by Per J Tube route daily   potassium & sodium phosphates (NEUTRA-PHOS) 280-160-250 MG Packet  Mother Yes No   Sig: Take 1 packet by mouth 3 times daily 0900, 1500, 2100.    testosterone cypionate (DEPOTESTOTERONE CYPIONATE) 200 MG/ML injection  Mother Yes No   Sig: Inject 76 mg into the muscle See Admin Instructions Every 2 weeks on    76 mg or 0.38 mL   vancomycin 1,500 mg   No No   Sig: Inject 1,500 mg into the vein every 12 hours for 8 doses   vitamin D3 (CHOLECALCIFEROL) 2000 units (50 mcg) tablet  Mother Yes No   Sig: Take 2,000 Units by mouth daily Crush and feed via j-tube @@ 0900      Facility-Administered Medications: None     Allergies   Allergies   Allergen Reactions     Dilantin [Phenytoin Sodium]      Valproic Acid      Toxicity c bone marrow suspension, elevated ammonia levels        Social History   I have reviewed this patient's social history and updated it with  pertinent information if needed. Keyon Farias  reports that he quit smoking about 30 years ago. He has never used smokeless tobacco. He reports that he does not drink alcohol or use drugs.    Family History   I have reviewed this patient's family history and updated it with pertinent information if needed.   Family History   Problem Relation Age of Onset     Cancer Father        Review of Systems   Review of systems not obtained due to patient factors - mental status    Physical Exam   Temp: 103.4  F (39.7  C) Temp src: Rectal BP: 110/75 Pulse: 104 Heart Rate: 99 Resp: 21 SpO2: 97 % O2 Device: None (Room air)    Vital Signs with Ranges  Temp:  [103.4  F (39.7  C)-104.1  F (40.1  C)] 103.4  F (39.7  C)  Pulse:  [104] 104  Heart Rate:  [] 99  Resp:  [20-21] 21  BP: (110-111)/(62-75) 110/75  SpO2:  [95 %-97 %] 97 %  170 lbs 0 oz    Constitutional: Awake, alert, cooperative, no apparent distress.  Eyes: Conjunctiva and pupils examined and normal.  HEENT: dry mucous membranes, normal dentition.  Respiratory: Clear to auscultation bilaterally, no crackles or wheezing.  Cardiovascular: Regular rate and rhythm, normal S1 and S2, and no murmur noted. GJ tube in good place, no drainage or erythema at the site  GI: Soft, non-distended, non-tender, normal bowel sounds.  Lymph/Hematologic: No anterior cervical or supraclavicular adenopathy.  Skin: No rashes, no cyanosis, no edema.  Musculoskeletal: No joint swelling, erythema or tenderness.  Neurologic: right hemiplegia .      Data   Data reviewed today:  I personally reviewed the chest x-ray image(s) showing as reporated below .  Recent Labs   Lab 09/13/19 2009   WBC 13.0*   HGB 12.6*   MCV 85   *   *   POTASSIUM 4.5   CHLORIDE 95   CO2 30   BUN 15   CR 0.64*   ANIONGAP 5   STEVE 8.8   GLC 76   ALBUMIN 3.4   PROTTOTAL 8.4   BILITOTAL 0.2   ALKPHOS 113   ALT 30   AST 19       Recent Results (from the past 24 hour(s))   XR Chest Port 1 View    Narrative    CHEST  ONE VIEW SUPINE 9/13/2019 8:30 PM     HISTORY: Fever 104.    COMPARISON: August 14, 2019      Impression    IMPRESSION: Stable elevated right hemidiaphragm. No gross infiltrates.    BERT ESPINOZA MD

## 2019-09-14 NOTE — PROGRESS NOTES
Pt resting in bed on room air. Sat 94%, lung sounds coarse Pt was able to cough/clear air way. Pt taking nebs.  Cj

## 2019-09-14 NOTE — CONSULTS
Care Transition Initial Assessment - SW     Met with: phone call with pt's guardian- mom    Active Problems:    SIRS (systemic inflammatory response syndrome) (H)       DATA  Identified issues/concerns regarding health management: Sw acknowledged consult. SW spoke with pt's guardian his mom over the phone. Pt currently received SNV during the day through [facility]. PCA at night through all home health. Likely will discharge home tomorrow, SW will setup stretcher ride as this is how pt has transported in the past. Pt will need to be home by noon in order for services to resume. PCS form faxed to [employer] @ 9/15 [phone].     ASSESSMENT  Cognitive Status:  Not evaluated.  Concerns to be addressed: discharge planning.    PLAN  Financial costs for the patient includes TBD  Patient given options and choices for discharge YES  Patient/family is agreeable to the plan?  YES  Transportation via [place] @ 1115 .  Patient anticipates discharging to:  Discharge home.    BAYRON Swanson

## 2019-09-14 NOTE — PROGRESS NOTES
RECEIVING UNIT ED HANDOFF REVIEW    ED Nurse Handoff Report was reviewed by: Ryanne Shah RN on September 13, 2019 at 10:38 PM

## 2019-09-14 NOTE — PLAN OF CARE
Observation goals PRIOR TO DISCHARGE     Comments:     -Diagnostic tests and consults completed and resulted -Not met    -Vital signs normal or at patient baseline -Not met, soft B.P    -Infection is improving-partially met, temp trending down     -Returns to baseline functional status -Not met    -Safe disposition plan has been identified -Not met    Nurse to notify provider when observation goals have been met and patient is ready for discharge.

## 2019-09-14 NOTE — PLAN OF CARE
"PRIMARY DIAGNOSIS: \"GENERIC\" NURSING  OUTPATIENT/OBSERVATION GOALS TO BE MET BEFORE DISCHARGE:  1. ADLs back to baseline: Yes    2. Activity and level of assistance: pt max assist, LIft, baseline r/t TBI     3. Pain status: Pain free.    4. Return to near baseline physical activity: Yes     Discharge Planner Nurse   Safe discharge environment identified: Yes  Barriers to discharge: Yes       Entered by: Bonny Turner 09/14/2019 12:26 PM     Please review provider order for any additional goals.   Nurse to notify provider when observation goals have been met and patient is ready for discharge.  "

## 2019-09-14 NOTE — H&P
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      The patient is aphasic, unable to obtain much history from him.  He lives with his mother.  His mother noticed that the patient was not feeling good and was somewhat delirious as per the patient's mother.  She took his temperature.  His temperature was 104.  She called the ambulance and brought him here.  While en route, he did vomit once, as per the mother.  There is no history of any nausea, vomiting at home.  No coughing or shortness of breath.  No diarrhea recently.      In the ER have extensive workup done, had mildly elevated white cell count.  Chest x-ray was negative.  UA negative.  Lactic acid was mildly elevated at 2.2, got better after IV fluids.  Procalcitonin was negative as well, suppressed.  They were offered to go home with symptomatic treatment, but the mother was not comfortable with that and she wanted him to be admitted.  At this time, the Hospitalist Service is consulted for further evaluation.      ASSESSMENT AND PLAN:     1.  SIRS, The source most likely secondary to viral syndrome:  This is a 59-year-old male with history of recurrent aspiration pneumonias, right-sided hemiplegia, aphasia and other comorbidities, now presented with just fever, no source of infection at this time.  His white cell count is slightly high at 13,000.  Procalcitonin is actually is not elevated, it was less  than 0.05, which suggests no acute BACTERIAL infection.  I think this is most likely a viral syndrome.  I will go ahead and check for respiratory virus panel by PCR, admit him, start him on IV fluids, keep him on symptomatic treatment with Tylenol and ibuprofen at this time.  Blood cultures and urine cultures and urine obtained.  Urine is normal.  We will wait for the urine culture and virus panel at this time.  We will not start him on IV antibiotic at this time.  He is on IV levofloxacin for 90 days since .  We will continue that while he is in the hospital.   2.  History of seizure disorder, on carbamazepine 150 mg every 6 hours and Brivaracetam.  We will continue with that.   3.  History of aspiration pneumonia in the past, now status post G-tube placement.  We will continue his tube feeding through the G-tube.  Aspiration precautions.   4.  GERD, on a statin.  We will continue with that.     5.  Panhypopituitarism.  He is on hydrocortisone and levothyroxine.  We will continue with that.  He gets testosterone injection every 2 weeks on .     6.  Right hemiplegia secondary to traumatic brain injury in the past.  We will have PT, OT and nursing care.     7.  Mild thrombocytopenia, most likely secondary to viral infection will improve with time.   8.  Mild hyponatremia, asymptomatic.  Keep him on IV fluids for now.   9.  Deep venous thrombosis prophylaxis with Lovenox.   10.  CODE STATUS:  Full code as per the patient's mother wishes.   11.  The case was discussed with the ER physician and the nursing staff taking care of the patient.         KADEEM CÁRDENAS MD             D: 2019   T: 2019   MT: GIRMA      Name:     BERT BARAJAS   MRN:      [id number]        Account:      [id number]   :      1960        Admitted:     2019                   Document: [id number]       cc: Carlos Gomez MD

## 2019-09-14 NOTE — ED PROVIDER NOTES
History     Chief Complaint:  Fever    The history is provided by the EMS personnel. The history is limited by the condition of the patient.      Keyon Farias is a 59 year old male with an extensive history that includes TBI, aphasia, DVT, seizures, septic shock, ventricular tachycardia, ventricular fibrillation and tracheostomy who presents to the emergency department for evaluation of fever and vomiting. EMS reports that the staff at the [facility] where the patient lives noted that the patient had a fever of 104F. En route to the emergency room the patient also had an episode of emesis. He has not taken any medications prior to arrival.     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  Smoking Status: Former  Smokeless Tobacco: Never  Alcohol Use: No  Drug Use: No  Marital Status:  Single      Review of Systems   Constitutional: Positive for fever.   Gastrointestinal: Positive for vomiting.   All other systems reviewed and are negative.        Physical Exam   Vitals:  Patient Vitals for the past 24 hrs:   BP Temp Temp src Pulse Heart Rate Resp SpO2 Height Weight   09/13/19 2120 -- 103.4  F (39.7  C) Rectal -- -- -- -- -- --   09/13/19 2030 110/75 -- -- 104 99 21 97 % -- --   09/13/19 2002  "111/62 104.1  F (40.1  C) Rectal -- 115 20 95 % 1.753 m (5' 9\") 77.1 kg (170 lb)     Physical Exam   Constitutional:   Contracted nonverbal lying in stretcher   HENT:   Head: Normocephalic and atraumatic.   Eyes: Pupils are equal, round, and reactive to light.   Neck: Normal range of motion.   Cardiovascular: Normal rate.   Pulmonary/Chest: Effort normal. No respiratory distress.   Abdominal: Soft. He exhibits no distension.   G-tube in the mid abdomen without concern for secondary infection.   Musculoskeletal:   Hand somewhat contracted.  Slightly rigid with palpation.  No erythema or signs of cellulitis.  No joint effusions   Neurological: He is alert.   Skin: Skin is warm.   Psychiatric:   Difficult to assess due to developmental delay.   Nursing note and vitals reviewed.        Emergency Department Course     Imaging:  Radiographic findings were communicated with the patient who voiced understanding of the findings.     XR Chest Port 1 View  Stable elevated right hemidiaphragm. No gross infiltrates.  Reading per radiology.    Laboratory:  CBC: WBC 13.0 (H) HGB 12.6 (L)  (L) o/w WNL.  CMP: (L) Creatinine 0.64 (L) o/w  AWNL    Lactic Acid: 2.2 (H)  UA with micro: pH 8.5 (H) o/w negative    ISTAT gases lactate gabe POCT: pH: 7.38, PCO2: 42, PO2: 20 (L), Bicarbonate: 24, O2 Sat: 31, Lactic acid: 2.1  Procalcitonin: <0.05    Blood Culture x2: Pending  Urine Culture: Pending  Respiratory Virus Panel: Pending     Interventions:  2012 Tylenol 975 mg Rectal  2014 Lactated Ringers Bolus 1000 mL IV    Emergency Department Course:  Nursing notes and vitals reviewed. 1953 I performed an exam of the patient as documented above.     IV inserted. Medicine administered as documented above. Blood drawn. This was sent to the lab for further testing, results above.    The patient was sent for a chest XR while in the emergency department, findings above.    2114 I rechecked and updated the patient.    2144 I rechecked " the patient and discussed the results of his workup thus far.     2150 I spoke with Dr. Mars, hospitalist, who agreed to admit the patient.    Findings and plan explained to the mother who consents to observation or admission. Discussed the patient with Dr. Mars, who will place the patient in obvs bed for further monitoring, evaluation, and treatment.    I personally reviewed the laboratory results with the mother and answered all related questions prior to admission.    Impression & Plan      Medical Decision Making:  Patient presents with fever at home.  Examination shows a contracted bedbound 59-year-old male without clear source of infection on examination.  Lab work and urinalysis are all normal.  Patient's fever is by rectal temperature 104 but pulse is normal blood pressures are normal no signs of sepsis lactic acid is normal procalcitonin and white count are normal.  Due to the normality of his exams I did consider discharge.  Mom is here with the patient who apparently is his caregiver and feels uncomfortable with discharge and continually asks about the source of fever.  Mom was advised that viral infections do happen.  That not all fever needs antibiotics.  Patient's been hospitalized multiple times but mom's goal is to be hospitalized the patient with concerns of fever and she feels she is unable to care for him at home.  At this time.  Care was discussed with hospitalist will admit for fever for antipyretic erratic slow IV hydration and observation of his cultures no empiric antibiotics were given due to lack of source either on exam or in laboratory tests      Diagnosis:    ICD-10-CM    1. Fever, unspecified fever cause R50.9        Disposition:  Admitted to Dr. Mars.    I, Steve Hernández, am serving as a scribe on 9/13/2019 at 7:56 PM to personally document services performed by Brandon Christine MD based on my observations and the provider's statements to me.     Steve Hernández  9/13/2019    EMERGENCY  DEPARTMENT       Brandon Christine MD  09/13/19 [id number]

## 2019-09-14 NOTE — ED TRIAGE NOTES
STAFF noted that pt had fever of 100.4 at home. Pt did have an emesis on the way to the hospital.

## 2019-09-14 NOTE — PLAN OF CARE
"PRIMARY DIAGNOSIS: \"GENERIC\" NURSING  OUTPATIENT/OBSERVATION GOALS TO BE MET BEFORE DISCHARGE:  ADLs back to baseline: Yes    Activity and level of assistance: pt max assist, LIft, baseline r/t TBI     Pain status: Pain free.    Return to near baseline physical activity: Yes     Discharge Planner Nurse   Safe discharge environment identified: Yes  Barriers to discharge: Yes       Entered by: Bonny Turner 09/14/2019 10:52 AM     Please review provider order for any additional goals.   Nurse to notify provider when observation goals have been met and patient is ready for discharge.  "

## 2019-09-14 NOTE — ED NOTES
Bed: ED29  Expected date:   Expected time:   Means of arrival:   Comments:  Kerri 1 - 48M not feeling well - ETA 3min

## 2019-09-14 NOTE — PHARMACY-ADMISSION MEDICATION HISTORY
Admission medication history interview status for the 9/13/2019  admission is complete. See EPIC admission navigator for prior to admission medications     Medication history source reliability:Good    Actions taken by pharmacist (provider contacted, etc):Interviewed pt's mom. She is a good historian.      Additional medication history information not noted on PTA med list :  - Deleted: glycopyrrolate 2 mg tablet, Polyethylene glycol packet  - Added: Vitamin C 1000 mg tablet    Medication reconciliation/reorder completed by provider prior to medication history? No    Time spent in this activity: 30 minutes    Prior to Admission medications    Medication Sig Last Dose Taking? Auth Provider   acetylcysteine (MUCOMYST) 20 % neb solution Take 2 mLs by nebulization 4 times daily  Patient taking differently: Take 2 mLs by nebulization every 6 hours with Albuterol 9/13/2019 at PM Yes Starr Champion MD   albuterol (PROVENTIL) (5 MG/ML) 0.5% neb solution Take 2.5 mg by nebulization every 4 hours (while awake) 0700 1100 1500 1900 with mucomyst  9/13/2019 at PM Yes Unknown, Entered By History   aspirin (ASA) 81 MG chewable tablet 81 mg by Oral or Feeding Tube route daily At 0900 9/13/2019 at 9 AM Yes Unknown, Entered By History   bacitracin ointment Apply topically daily as needed for wound care To PEG site.  PRN Yes Unknown, Entered By History   Bioflavonoid Products (VITAMIN C PLUS) 1000 MG TABS 1 tablet by Oral or FT or NG tube route 9/13/2019 at 9 AM Yes Unknown, Entered By History   Brivaracetam (BRIVIACT) 10 MG/ML solution 100 mg by Oral or Feeding Tube route every evening  9/13/2019 at 9 PM Yes Unknown, Entered By History   Brivaracetam (BRIVIACT) 10 MG/ML solution 50 mg by Oral or FT or NG tube route every morning @0900 9/13/2019 at 9 AM Yes Reported, Patient   calcium carbonate 1250 (500 CA) MG/5ML SUSP suspension 5 mLs (1,250 mg) by Per J Tube route 3 times daily (with meals) 9/13/2019 at 9 PM Yes Mariana Venegas  MD [place]   carBAMazepine (TEGRETOL) 100 MG/5ML suspension 150 mg by Oral or Feeding Tube route every 6 hours At 06:00, 12:00, 18:00 and 24:00 for seizures  9/14/2019 at 12 AM Yes Unknown, Entered By History   ferrous sulfate 220 (44 Fe) MG/5ML ELIX 220 mg by Per Feeding Tube route daily 9/13/2019 at 9 AM Yes Unknown, Entered By History   Guar Gum (FIBER MODULAR, NUTRISOURCE FIBER,) packet 1 packet by Per G Tube route daily 9/13/2019 at 9 AM Yes Starr Champion MD   hydrocortisone (CORTEF) 5 MG tablet 10 mg by Oral or FT or NG tube route daily (with dinner) At 1500 9/13/2019 at 3 PM Yes Unknown, Entered By History   hydrocortisone (CORTEF) 5 MG tablet 20 mg by Oral or FT or NG tube route every morning  9/13/2019 at 9 AM Yes Unknown, Entered By History   hydrocortisone 1 % CREA cream Place rectally 2 times daily as needed for other Apply to reddened memo areas as needed PRN Yes Unknown, Entered By History   ipratropium - albuterol 0.5 mg/2.5 mg/3 mL (DUONEB) 0.5-2.5 (3) MG/3ML neb solution Take 1 vial by nebulization every 4 hours as needed (bronchospasms or cough) PRN Yes Unknown, Entered By History   levothyroxine (SYNTHROID/LEVOTHROID) 137 MCG tablet 137 mcg by Per Feeding Tube route every morning (before breakfast) 9/13/2019 at 5 AM Yes Unknown, Entered By History   melatonin (MELATONIN) 1 MG/ML LIQD liquid 6 mg by Per NG tube route At Bedtime  9/13/2019 at PM Yes Unknown, Entered By History   metoclopramide (REGLAN) 5 MG/5ML solution 5 mg by Per Feeding Tube route 2 times daily 9/13/2019 at 9 AM Yes Unknown, Entered By History   miconazole (MICATIN) 2 % AERP powder Apply topically 2 times daily as needed  PRN Yes Unknown, Entered By History   mupirocin (BACTROBAN) 2 % external ointment Apply topically 2 times daily as needed  PRN Yes Reported, Patient   pantoprazole (PROTONIX) 2 mg/mL SUSP suspension 20 mLs (40 mg) by Per J Tube route daily 9/13/2019 at 9 AM Yes Washington Connors MD   potassium & sodium  phosphates (NEUTRA-PHOS) 280-160-250 MG Packet Take 1 packet by mouth 3 times daily 0900, 1500, 2100.  9/13/2019 at 9 PM Yes Unknown, Entered By History   Skin Protectants, Misc. (BALMEX SKIN PROTECTANT) OINT Externally apply topically 2 times daily as needed (irritation) Applay to reddened memo areas twice daily as needed PRN Yes Unknown, Entered By History   vitamin D3 (CHOLECALCIFEROL) 2000 units (50 mcg) tablet Take 2,000 Units by mouth daily Crush and feed via j-tube @@ 0900 9/13/2019 at 9 AM Yes Unknown, Entered By History   order for DME Equipment being ordered: Nebulizer   Starr Champion MD   testosterone cypionate (DEPOTESTOTERONE CYPIONATE) 200 MG/ML injection Inject 76 mg into the muscle See Admin Instructions Every 2 weeks on Fridays   76 mg or 0.38 mL Unknown at Unknown time Yes Unknown, Entered By History

## 2019-09-14 NOTE — PROGRESS NOTES
Observation goals PRIOR TO DISCHARGE     Comments:      -Diagnostic tests and consults completed and resulted -partially met    -Vital signs normal or at patient baseline - VSS    -Infection is improving-partially met, temp, Temp is WDL    -Returns to baseline functional status - partially met    -Safe disposition plan has been identified -Not met    Nurse to notify provider when observation goals have been met and patient is ready for discharge.

## 2019-09-15 VITALS
HEIGHT: 69 IN | DIASTOLIC BLOOD PRESSURE: 49 MMHG | BODY MASS INDEX: 23.51 KG/M2 | HEART RATE: 66 BPM | OXYGEN SATURATION: 97 % | TEMPERATURE: 99 F | SYSTOLIC BLOOD PRESSURE: 101 MMHG | RESPIRATION RATE: 18 BRPM | WEIGHT: 158.7 LBS

## 2019-09-15 LAB
BACTERIA SPEC CULT: NO GROWTH
CREAT SERPL-MCNC: 0.69 MG/DL (ref 0.66–1.25)
GFR SERPL CREATININE-BSD FRML MDRD: >90 ML/MIN/{1.73_M2}
Lab: NORMAL
PLATELET # BLD AUTO: 116 10E9/L (ref 150–450)
SODIUM SERPL-SCNC: 136 MMOL/L (ref 133–144)
SPECIMEN SOURCE: NORMAL
WBC # BLD AUTO: 8.9 10E9/L (ref 4–11)

## 2019-09-15 PROCEDURE — 84295 ASSAY OF SERUM SODIUM: CPT | Performed by: INTERNAL MEDICINE

## 2019-09-15 PROCEDURE — G0378 HOSPITAL OBSERVATION PER HR: HCPCS

## 2019-09-15 PROCEDURE — 96361 HYDRATE IV INFUSION ADD-ON: CPT

## 2019-09-15 PROCEDURE — 85049 AUTOMATED PLATELET COUNT: CPT | Performed by: INTERNAL MEDICINE

## 2019-09-15 PROCEDURE — 82565 ASSAY OF CREATININE: CPT | Performed by: INTERNAL MEDICINE

## 2019-09-15 PROCEDURE — 36415 COLL VENOUS BLD VENIPUNCTURE: CPT | Performed by: PHYSICIAN ASSISTANT

## 2019-09-15 PROCEDURE — 85048 AUTOMATED LEUKOCYTE COUNT: CPT | Performed by: PHYSICIAN ASSISTANT

## 2019-09-15 PROCEDURE — 99217 ZZC OBSERVATION CARE DISCHARGE: CPT | Performed by: HOSPITALIST

## 2019-09-15 PROCEDURE — 25000128 H RX IP 250 OP 636: Performed by: INTERNAL MEDICINE

## 2019-09-15 PROCEDURE — 25800030 ZZH RX IP 258 OP 636: Performed by: PHYSICIAN ASSISTANT

## 2019-09-15 PROCEDURE — 40000275 ZZH STATISTIC RCP TIME EA 10 MIN

## 2019-09-15 PROCEDURE — 94640 AIRWAY INHALATION TREATMENT: CPT

## 2019-09-15 PROCEDURE — 36415 COLL VENOUS BLD VENIPUNCTURE: CPT | Performed by: INTERNAL MEDICINE

## 2019-09-15 PROCEDURE — 96376 TX/PRO/DX INJ SAME DRUG ADON: CPT

## 2019-09-15 PROCEDURE — 25000132 ZZH RX MED GY IP 250 OP 250 PS 637: Mod: GY | Performed by: INTERNAL MEDICINE

## 2019-09-15 PROCEDURE — 25000125 ZZHC RX 250: Performed by: INTERNAL MEDICINE

## 2019-09-15 RX ORDER — LEVOFLOXACIN 500 MG/1
500 TABLET, FILM COATED ORAL DAILY
Qty: 5 TABLET | Refills: 0 | Status: ON HOLD | OUTPATIENT
Start: 2019-09-15 | End: 2019-09-20

## 2019-09-15 RX ORDER — IPRATROPIUM BROMIDE AND ALBUTEROL SULFATE 2.5; .5 MG/3ML; MG/3ML
1 SOLUTION RESPIRATORY (INHALATION) EVERY 4 HOURS PRN
Status: DISCONTINUED | OUTPATIENT
Start: 2019-09-15 | End: 2019-09-15 | Stop reason: HOSPADM

## 2019-09-15 RX ADMIN — CARBAMAZEPINE 150 MG: 100 SUSPENSION ORAL at 06:54

## 2019-09-15 RX ADMIN — BRIVARACETAM 100 MG: 10 SOLUTION ORAL at 09:42

## 2019-09-15 RX ADMIN — PANTOPRAZOLE SODIUM 40 MG: 40 TABLET, DELAYED RELEASE ORAL at 09:46

## 2019-09-15 RX ADMIN — ACETYLCYSTEINE 2 ML: 200 SOLUTION ORAL; RESPIRATORY (INHALATION) at 09:20

## 2019-09-15 RX ADMIN — LEVOFLOXACIN 500 MG: 5 INJECTION, SOLUTION INTRAVENOUS at 00:15

## 2019-09-15 RX ADMIN — HYDROCORTISONE 20 MG: 20 TABLET ORAL at 09:41

## 2019-09-15 RX ADMIN — LEVOTHYROXINE SODIUM 150 MCG: 75 TABLET ORAL at 06:54

## 2019-09-15 RX ADMIN — ASPIRIN 81 MG 81 MG: 81 TABLET ORAL at 09:42

## 2019-09-15 RX ADMIN — POTASSIUM & SODIUM PHOSPHATES POWDER PACK 280-160-250 MG 1 PACKET: 280-160-250 PACK at 09:41

## 2019-09-15 RX ADMIN — ENOXAPARIN SODIUM 40 MG: 40 INJECTION SUBCUTANEOUS at 09:40

## 2019-09-15 RX ADMIN — CARBAMAZEPINE 150 MG: 100 SUSPENSION ORAL at 00:14

## 2019-09-15 RX ADMIN — Medication 1 PACKET: at 07:04

## 2019-09-15 RX ADMIN — SODIUM CHLORIDE: 9 INJECTION, SOLUTION INTRAVENOUS at 00:15

## 2019-09-15 RX ADMIN — ALBUTEROL SULFATE 2.5 MG: 2.5 SOLUTION RESPIRATORY (INHALATION) at 09:19

## 2019-09-15 NOTE — PROGRESS NOTES
Patient seen and examined with ALBERT Barillas; patient well known to me from similar prior admissions    - no acute issues overnight apart from low grade temp 100.4, repeat temp shortly after noted at 99 F    - clinically improved, with temp trended dwon from 104 on admission, clinically doesnot look septic, wbc trended down 13---8.9, negative procalcitonin  - no obvious source of infection although Keyon has had recurrent presentations similar to this and there has been concerns for chemical pneumonitis vs recurrent pneumonia  - w/u mostly UR, normal UA, urine/blood cultures with no growth so far, Respiratory virus panel negative, CXR with no significant infiltrates    O/E  Vitals stable, clinically doesnot appear septic  Communicates cheerfully with thumbs up  Chest clear  Abd josé brambila    - discussed clinical condition and discharge plan at length with his mother over the phone  - discharge home today of empiric po Levaquin

## 2019-09-15 NOTE — PLAN OF CARE
"PRIMARY DIAGNOSIS: \"GENERIC\" NURSING  OUTPATIENT/OBSERVATION GOALS TO BE MET BEFORE DISCHARGE:  ADLs back to baseline: Yes    Activity and level of assistance: pt max assist baseline     Pain status: Pain free.    Return to near baseline physical activity: Yes     Discharge Planner Nurse   Safe discharge environment identified: Yes  Barriers to discharge: No       Entered by: Bonny Turner 09/15/2019 9:50 AM     Please review provider order for any additional goals.   Nurse to notify provider when observation goals have been met and patient is ready for discharge.  "

## 2019-09-15 NOTE — PROGRESS NOTES
Writer did not meet with patient or family.  Patient is an elevated readmission risk.  Writer submitted hand off to the patient's PCP dr. Carlos Gomez Fax# [phone]

## 2019-09-15 NOTE — PROVIDER NOTIFICATION
MD Notification    Notified Person: MD    Notified Person Name: EVELYN Barillas    Notification Date/Time: 9/15 8:28 a.m.    Notification Interaction: FYI page that a stretcher ride was set up in anticipation of discharge today for 11:15 a.m. Per  patient's mother informed her that she needs the patient home by 12:00 in order to have the proper care staff there to take care of Keyon    Purpose of Notification: FYI page for discharge orders if medically stable    Orders Received: await a c/b for further updates/clarification or completed discharge orders    Comments:

## 2019-09-15 NOTE — PROGRESS NOTES
Pt on room air sat 98%. Lung sounds clear diminished with some small coarse sound. Pt able to cough and clear air way. Taking all Nebs no complications.  Cj  RT

## 2019-09-15 NOTE — PROGRESS NOTES
Diagnostic tests and consults completed and resulted -met    -Vital signs normal or at patient baseline - VSS    -Infection is improving-partially met, temp, Temp is WDL    -Returns to baseline functional status - met    -Safe disposition plan has been identified -met    Nurse to notify provider when observation goals have been met and patient is ready for discharge.

## 2019-09-15 NOTE — PLAN OF CARE
Patient is non verbal but can make gestures to communicate, Alert and partially oriented, VSS on RA , with intermittent coughing, BS WDL, on NGT feeding, incontinent for both bowel and bladder, repositioned and changed Q2 hrs, has not shown signs of pain. For possible discharge tomorrow.

## 2019-09-15 NOTE — PROGRESS NOTES
Pt alert, iv sl and removed, tube feeding running.  Pt smiling and laughing, thumbs up when told he could go home.    Discharge paperwork reviewed with mom/andrei via phone, mom has a cough and does not want to expose pt.  Discharge instructions including medication reviewed and pt low grade temp at times but no source of infection.  Phyliss verbalized understnanding of paperwork and will send home with pt too.  Any questions answered.  Pt discharge via HE stretcher to home.

## 2019-09-15 NOTE — PLAN OF CARE
Observation goals PRIOR TO DISCHARGE     Comments:     -Diagnostic tests and consults completed and resulted -Met    -Vital signs normal or at patient baseline -Met    -Infection is improving-Yes    -Returns to baseline functional status -Met    -Safe disposition plan has been identified -Met, home with mom    Nurse to notify provider when observation goals have been met and patient is ready for discharge.

## 2019-09-15 NOTE — PLAN OF CARE
Observation goals PRIOR TO DISCHARGE     Comments:     -Diagnostic tests and consults completed and resulted -Met    -Vital signs normal or at patient baseline -Met    -Infection is improving-Yes    -Returns to baseline functional status -Met    -Safe disposition plan has been identified -Met, home with mom    Nurse to notify provider when observation goals have been met and patient is ready for discharge.        Pt is alert but non verbal.Low grade temp(99) at beginning of the shift but was later 98.8 otherwise VSS on RA, no sign of pain noted. Total care, incontinent B&B, continues to take condom catheter and his Tele, condom catheter not  replaced.PEG tube insitu and patent, pt is NPO, per mom, gets isosource tube feeding from 0700 to 1900. Non blanchable redness/ excoriations on coccyx,sore and tender and pt gets agitated when being cleaned, barrier cream applied.IVF Nacl cont@75 on rt upper arm.Plan is to discharge home with mom today, ride scheduled for 11:15am.

## 2019-09-15 NOTE — DISCHARGE SUMMARY
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  Hypothyroidism.  Hypogonadism.  History of enlarging pancreatic tail cyst    History of Present Illness   Keyon Farias is an 59 year old male with a complex past medical history significant for traumatic brain injury with subsequent panhypopituitarism and spastic hemiplegia, seizures, chronic dysphagia status post PEG tube placement, hx upper extremity DVT, prior necrotizing pancreatitis with pseudocyst and multiple episodes of sepsis due to recurrent aspiration pneumonia  and UTI, recent admission to [facility] for septic shock secondary to UTI who was admitted on 9/13/2019 after presenting with fever. The patient was recently admitted to [facility] ER from 8/12/19-8/16/19 for septic shock related to UTI. He was discharged on a 7 day course of IV Levaquin which was completed. Per his mother's report he was doing fine until the day of admission when he seemed less responsive and was found to have a fever of 104. He was brought to the ED via EMS and had an episode of emesis en route. He was evaluated in the ED without any obvious source of infection but again had high fever of 104 and was admitted to observation for ongoing monitoring. Please see admission H&P by Dr. Mars for additional information.     On day of discharge patient is doing well. He does not communicate much verbally but will nod and give thumbs up. He denies pain today. He is cheerful and gives thumbs up when asked if he is feeling well.     Hospital Course   Keyon Farias was admitted on 9/13/2019.  The following problems were addressed  during his hospitalization:    Fever of unknown origin  SIRS criteria  Mr Farias has a complicated history of frequent infections and sepsis. Most recently admitted 8/12/19-8/16/19 for septic shock related to UTI. He was discharged on a 7 day course of IV Levaquin which he completed las month. He has not had fevers or infectious symptoms until the day of admission when he had fever of 104. Mother reports he was less interactive at that time with observed rigors. She feels his chronic sputum production may be slightly increased but otherwise has not noticed any other abnormal symptoms. UA is bland, culture negative. Procalcitonin is <0.05. WBC on admission 13>16>normalized to 8 day of discharge. Last fever 103.4 at 2300 9/13. Low grade temp of 100.4 day of discharge, down to 99 on recheck just a few minutes later. Minimal cough observed through admission and patient breathing comfortably. Oxygen saturations are in high 90s on room air. Viral syndrome considered, respiratory panel is negative. Overall well appearing through admission.   --started on IV Levaquin on admission, while no obvious source is clear we will continue empiric oral levaquin at discharge for 5 days   --blood cultures NGTD  --advised to return to the ED if fevers reoccur     Hyponatremia, resolved. Na on admission 130>129>136.. Note hypernatremia on discharge 8/16. He has history of mild hyponatremia.      Thrombocytopenia. Noted since 8/2019 during admission for sepsis. Stable around 120. No evidence of bleeding.   --monitor      Recurrent aspiration pneumonia vs pneumonitis. Patient has had numerous episodes of recurrence despite tube feeding and NPO status. He is on tube feeds prior to admission. One episode of vomiting en route. Currently without obvious lung pathology. Oxygen saturations high 90s on room air, CXR unremarkable, and he is breathing comfortably.   --NPO  --continue tube feeds, nutrition following   --HOB elevated      Seizure  disorder:    - continued PTA Tegretol and Briviact     Panhypopituitarism:    - continued PTA hydrocortisone 20 mg in morning and 10 mg at night      Hypothyroidism.   -  Continued PTA Synthroid      Gonadotropin, hypogonadism.   - Resume PTA testosterone injection in the outpatient setting.      History of traumatic brain injury with sequelae of aphasia and spastic paralysis  Mother is primary caregiver at home and has PCA and nursing assistance. Not interested in placement.   --repositioned q2 hours     History of enlarging pancreatic tail cyst.  He has history of necrotizing pancreatitis requiring cystogastrostomy with a stent placement and subsequent removal.  During one of his recent hospitalization, a CT was noted with enlarging cyst.  This was evaluated by Dr. Davis from GI during last hospitalization, was suggested a followup CT scan in 6 months (5/2019)     This patient was seen and examined with Dr. Galan who agrees with the above plan.    Bindu Barillas PA-C, [facility]AID[facility]    Significant Results and Procedures   See below     Code Status   Full Code       Primary Care Physician   Carlos Gomez    Physical Exam   Temp: 99  F (37.2  C) Temp src: Axillary BP: 101/49   Heart Rate: 91 Resp: 18 SpO2: 97 % O2 Device: None (Room air)    Vitals:    09/13/19 2002 09/13/19 2258   Weight: 77.1 kg (170 lb) 72 kg (158 lb 11.2 oz)     Vital Signs with Ranges  Temp:  [97.4  F (36.3  C)-100.4  F (38  C)] 99  F (37.2  C)  Heart Rate:  [] 91  Resp:  [16-18] 18  BP: (101-124)/(49-84) 101/49  SpO2:  [97 %-99 %] 97 %  I/O last 3 completed shifts:  In: 540 [P.O.:440]  Out: -     Constitutional: Awake, alert, cooperative. Subtly nods or shakes head at times. Appears comfortable. non-toxic appearing   ENT: normal cephalic, moist mucous membranes  Respiratory: Lungs clear to auscultation, no increased work of breathing or wheezing.   Cardiovascular: Regular rate and rhythm, no murmur, no LE edema, distal pulses +2/4  GI:  Normal active bowel sounds, abdomen soft, does not appear tender with palpation. gtube in place without surrounding erythema or drainage   Skin/Integumen: warm, dry  MSK:  Moves extremities spontaneously. Makes thumbs up with left hand.   Neuro:  Right hemiplegia at baseline. Largely non-verbal at baseline.     Discharge Disposition   Discharged to home  Condition at discharge: Stable    Consultations This Hospital Stay   SOCIAL WORK IP CONSULT  NUTRITION SERVICES ADULT IP CONSULT  PHARMACY IP CONSULT    Time Spent on this Encounter   IBindu PA-C, personally saw the patient today and spent greater than 30 minutes discharging this patient.    Discharge Orders      Reason for your hospital stay    You were here for evaluation of fever. To be conservative we will discharge you on a course of oral antibiotics.     Follow-up and recommended labs and tests     Follow up with primary care provider within 5 days for hospital follow up.     When to contact your care team    Monitor for fevers >100.4     When to contact your care team    You should be re-evaluated if develop fevers again     Discharge Instructions    Please complete 5 more days of Levaquin through the feeding tube     Full Code     Diet    Follow this diet upon discharge: Orders Placed This Encounter      Adult Formula Drip Feeding: Continuous Isosource 1.5; Jejunostomy; Goal Rate: 100; mL/hr; From: 7:00 AM; 7:00 PM; Medication - Feeding Tube Flush Frequency: At least 15-30 mL water before and after medication administration and with tube clogging;...     Discharge Medications   Discharge Medication List as of 9/15/2019 12:57 PM      START taking these medications    Details   levofloxacin (LEVAQUIN) 500 MG tablet 1 tablet (500 mg) by Per Feeding Tube route daily for 5 days, Disp-5 tablet, R-0, E-Prescribe         CONTINUE these medications which have NOT CHANGED    Details   acetylcysteine (MUCOMYST) 20 % neb solution Take 2 mLs by  nebulization 4 times daily, Disp-300 vial, R-0, E-Prescribe      albuterol (PROVENTIL) (5 MG/ML) 0.5% neb solution Take 2.5 mg by nebulization every 4 hours (while awake) 0700 1100 1500 1900 with mucomyst , Historical      aspirin (ASA) 81 MG chewable tablet 81 mg by Oral or Feeding Tube route daily At 0900, Historical      bacitracin ointment Apply topically daily as needed for wound care To PEG site. Historical      Bioflavonoid Products (VITAMIN C PLUS) 1000 MG TABS 1 tablet by Oral or FT or NG tube route, Historical      Brivaracetam (BRIVIACT) 10 MG/ML solution 100 mg by Oral or Feeding Tube route 2 times daily , Historical      calcium carbonate 1250 (500 CA) MG/5ML SUSP suspension 5 mLs (1,250 mg) by Per J Tube route 3 times daily (with meals), Disp-450 mL, Transitional      carBAMazepine (TEGRETOL) 100 MG/5ML suspension 150 mg by Oral or Feeding Tube route every 6 hours At 06:00, 12:00, 18:00 and 24:00 for seizures , Historical      ferrous sulfate 220 (44 Fe) MG/5ML ELIX 220 mg by Per Feeding Tube route daily, Historical      Guar Gum (FIBER MODULAR, NUTRISOURCE FIBER,) packet 1 packet by Per G Tube route daily, Disp-30 packet, R-0, E-Prescribe      !! hydrocortisone (CORTEF) 5 MG tablet 10 mg by Oral or FT or NG tube route daily (with dinner) At 1500, Historical      !! hydrocortisone (CORTEF) 5 MG tablet 20 mg by Oral or FT or NG tube route every morning , Historical      hydrocortisone 1 % CREA cream Place rectally 2 times daily as needed for other Apply to reddened memo areas as neededHistorical      ipratropium - albuterol 0.5 mg/2.5 mg/3 mL (DUONEB) 0.5-2.5 (3) MG/3ML neb solution Take 1 vial by nebulization every 4 hours as needed (bronchospasms or cough), Historical      levothyroxine (SYNTHROID/LEVOTHROID) 150 MCG tablet Take 150 mcg by mouth every morning, Historical      melatonin (MELATONIN) 1 MG/ML LIQD liquid 6 mg by Per NG tube route At Bedtime , Historical      metoclopramide (REGLAN) 5  MG/5ML solution 5 mg by Per Feeding Tube route 2 times daily, Historical      miconazole (MICATIN) 2 % AERP powder Apply topically 2 times daily as needed Historical      mupirocin (BACTROBAN) 2 % external ointment Apply topically 2 times daily as needed Historical      pantoprazole (PROTONIX) 2 mg/mL SUSP suspension 20 mLs (40 mg) by Per J Tube route daily, Disp-400 mL, R-1, E-Prescribe      potassium & sodium phosphates (NEUTRA-PHOS) 280-160-250 MG Packet Take 1 packet by mouth 3 times daily 0900, 1500, 2100. , Historical      Skin Protectants, Misc. (BALMEX SKIN PROTECTANT) OINT Externally apply topically 2 times daily as needed (irritation) Applay to reddened memo areas twice daily as neededHistorical      testosterone cypionate (DEPOTESTOTERONE CYPIONATE) 200 MG/ML injection Inject 76 mg into the muscle See Admin Instructions Every 2 weeks on Fridays   76 mg or 0.38 mL, Historical      vitamin D3 (CHOLECALCIFEROL) 2000 units (50 mcg) tablet Take 2,000 Units by mouth daily Crush and feed via j-tube @@ 0900, Historical      order for DME Equipment being ordered: NebulizerDisp-1 Units, R-0, Local Print       !! - Potential duplicate medications found. Please discuss with provider.      STOP taking these medications       acetaminophen (TYLENOL) 500 MG tablet Comments:   Reason for Stopping:         glycopyrrolate (GLYCATE) 2 MG tablet Comments:   Reason for Stopping:         levofloxacin (LEVAQUIN) 500 MG/100ML infusion Comments:   Reason for Stopping:             Allergies   Allergies   Allergen Reactions     Dilantin [Phenytoin Sodium]      Valproic Acid      Toxicity c bone marrow suspension, elevated ammonia levels      Data   Most Recent 3 CBC's:  Recent Labs   Lab Test 09/15/19  0818 09/15/19  0613 09/14/19  0636 09/13/19 2009 08/16/19  0500   WBC 8.9  --  16.2* 13.0* 6.6   HGB  --   --  10.9* 12.6* 9.2*   MCV  --   --  84 85 91   PLT  --  116* 112* 137* 115*      Most Recent 3 BMP's:  Recent Labs   Lab  Test 09/15/19  0613 09/14/19  0636 09/13/19 2009 08/16/19  0500    129* 130* 150*   POTASSIUM  --  4.2 4.5 3.8   CHLORIDE  --  98 95 120*   CO2  --  27 30 26   BUN  --  12 15 15   CR 0.69 0.75 0.64* 0.66   ANIONGAP  --  4 5 4   STEVE  --  7.6* 8.8 7.2*   GLC  --  84 76 137*     Most Recent 2 LFT's:  Recent Labs   Lab Test 09/13/19 2009 06/17/19  1830   AST 19 14   ALT 30 24   ALKPHOS 113 110   BILITOTAL 0.2 0.2     Most Recent INR's and Anticoagulation Dosing History:  Anticoagulation Dose History     Recent Dosing and Labs Latest Ref Rng & Units 12/1/2016 1/22/2017 1/22/2017 1/23/2017 1/25/2017 1/30/2017 2/7/2017    INR 0.86 - 1.14 1.30(H) 2.48(H) 2.58(H) 1.53(H) 1.49(H) 1.32(H) 1.27(H)        Most Recent 3 Troponin's:  Recent Labs   Lab Test 01/22/17  0500 01/21/17  2348 01/21/17  1600   TROPI 0.017 0.025 0.028     Most Recent Cholesterol Panel:  Recent Labs   Lab Test 11/29/16  0430   TRIG 100     Most Recent 6 Bacteria Isolates From Any Culture (See EPIC Reports for Culture Details):  Recent Labs   Lab Test 09/13/19  2124 09/13/19 2009 08/12/19  2300 08/12/19  1521 08/12/19  1405 08/12/19  1314   CULT No growth No growth after 1 day Methicillin resistant Staphylococcus aureus (MRSA) isolated* >10 Squamous epithelial cells/low power field indicates oral contamination. Please   recollect.  *  Canceled, Test credited  Notification of test cancellation was given to  Katie Jones RN, @8477 08/12/19.DH.   No growth  >100,000 colonies/mL  Lactose fermenting gram negative rods  *  >100,000 colonies/mL  Non lactose fermenting gram negative rods  *  >100,000 colonies/mL  Gram positive cocci  Strain 1  *  10,000 to 50,000 colonies/mL  Gram positive cocci  Strain 2  *  Susceptibility testing not routinely done  Susceptibility upon request   No growth     Most Recent TSH, T4 and A1c Labs:  Recent Labs   Lab Test 08/12/19  2200  07/05/18  0021   TSH  --   --  <0.01*   T4  --   --  1.66*   A1C 6.1*   < >  --     <  > = values in this interval not displayed.     Results for orders placed or performed during the hospital encounter of 09/13/19   XR Chest Port 1 View    Narrative    CHEST ONE VIEW SUPINE 9/13/2019 8:30 PM     HISTORY: Fever 104.    COMPARISON: August 14, 2019      Impression    IMPRESSION: Stable elevated right hemidiaphragm. No gross infiltrates.    BERT ESPINOZA MD

## 2019-09-16 ENCOUNTER — HOSPITAL ENCOUNTER (INPATIENT)
Facility: CLINIC | Age: 59
LOS: 4 days | Discharge: HOME HEALTH | DRG: 871 | End: 2019-09-20
Attending: EMERGENCY MEDICINE | Admitting: HOSPITALIST
Payer: MEDICARE

## 2019-09-16 ENCOUNTER — APPOINTMENT (OUTPATIENT)
Dept: GENERAL RADIOLOGY | Facility: CLINIC | Age: 59
DRG: 871 | End: 2019-09-16
Attending: EMERGENCY MEDICINE
Payer: MEDICARE

## 2019-09-16 DIAGNOSIS — A41.9 SEVERE SEPSIS (H): ICD-10-CM

## 2019-09-16 DIAGNOSIS — A41.9 SEPSIS, DUE TO UNSPECIFIED ORGANISM: ICD-10-CM

## 2019-09-16 DIAGNOSIS — R65.20 SEVERE SEPSIS (H): ICD-10-CM

## 2019-09-16 DIAGNOSIS — J18.9 PNEUMONIA OF BOTH LOWER LOBES DUE TO INFECTIOUS ORGANISM: Primary | ICD-10-CM

## 2019-09-16 LAB
ALBUMIN SERPL-MCNC: 3.1 G/DL (ref 3.4–5)
ALBUMIN UR-MCNC: 30 MG/DL
ALP SERPL-CCNC: 91 U/L (ref 40–150)
ALT SERPL W P-5'-P-CCNC: 28 U/L (ref 0–70)
ANION GAP SERPL CALCULATED.3IONS-SCNC: 6 MMOL/L (ref 3–14)
APPEARANCE UR: CLEAR
AST SERPL W P-5'-P-CCNC: 25 U/L (ref 0–45)
BASOPHILS # BLD AUTO: 0 10E9/L (ref 0–0.2)
BASOPHILS NFR BLD AUTO: 0.2 %
BILIRUB SERPL-MCNC: 0.3 MG/DL (ref 0.2–1.3)
BILIRUB UR QL STRIP: NEGATIVE
BUN SERPL-MCNC: 14 MG/DL (ref 7–30)
C DIFF TOX B STL QL: NEGATIVE
CALCIUM SERPL-MCNC: 8.7 MG/DL (ref 8.5–10.1)
CHLORIDE SERPL-SCNC: 93 MMOL/L (ref 94–109)
CO2 BLDCOV-SCNC: 28 MMOL/L (ref 21–28)
CO2 SERPL-SCNC: 29 MMOL/L (ref 20–32)
COLOR UR AUTO: YELLOW
CREAT SERPL-MCNC: 0.85 MG/DL (ref 0.66–1.25)
DIFFERENTIAL METHOD BLD: ABNORMAL
EOSINOPHIL # BLD AUTO: 0.1 10E9/L (ref 0–0.7)
EOSINOPHIL NFR BLD AUTO: 0.7 %
ERYTHROCYTE [DISTWIDTH] IN BLOOD BY AUTOMATED COUNT: 14.3 % (ref 10–15)
GFR SERPL CREATININE-BSD FRML MDRD: >90 ML/MIN/{1.73_M2}
GLUCOSE SERPL-MCNC: 114 MG/DL (ref 70–99)
GLUCOSE UR STRIP-MCNC: NEGATIVE MG/DL
HCT VFR BLD AUTO: 37.6 % (ref 40–53)
HGB BLD-MCNC: 12.8 G/DL (ref 13.3–17.7)
HGB UR QL STRIP: NEGATIVE
IMM GRANULOCYTES # BLD: 0 10E9/L (ref 0–0.4)
IMM GRANULOCYTES NFR BLD: 0.3 %
KETONES UR STRIP-MCNC: NEGATIVE MG/DL
LACTATE BLD-SCNC: 1.5 MMOL/L (ref 0.7–2)
LACTATE BLD-SCNC: 2.5 MMOL/L (ref 0.7–2.1)
LEUKOCYTE ESTERASE UR QL STRIP: NEGATIVE
LYMPHOCYTES # BLD AUTO: 1.3 10E9/L (ref 0.8–5.3)
LYMPHOCYTES NFR BLD AUTO: 13.3 %
MCH RBC QN AUTO: 29.3 PG (ref 26.5–33)
MCHC RBC AUTO-ENTMCNC: 34 G/DL (ref 31.5–36.5)
MCV RBC AUTO: 86 FL (ref 78–100)
MONOCYTES # BLD AUTO: 0.6 10E9/L (ref 0–1.3)
MONOCYTES NFR BLD AUTO: 6.3 %
NEUTROPHILS # BLD AUTO: 8 10E9/L (ref 1.6–8.3)
NEUTROPHILS NFR BLD AUTO: 79.2 %
NITRATE UR QL: NEGATIVE
NRBC # BLD AUTO: 0 10*3/UL
NRBC BLD AUTO-RTO: 0 /100
PCO2 BLDV: 49 MM HG (ref 40–50)
PH BLDV: 7.36 PH (ref 7.32–7.43)
PH UR STRIP: 8.5 PH (ref 5–7)
PLATELET # BLD AUTO: 153 10E9/L (ref 150–450)
PO2 BLDV: 14 MM HG (ref 25–47)
POTASSIUM SERPL-SCNC: 4.2 MMOL/L (ref 3.4–5.3)
PROCALCITONIN SERPL-MCNC: 0.2 NG/ML
PROT SERPL-MCNC: 8.3 G/DL (ref 6.8–8.8)
RBC # BLD AUTO: 4.37 10E12/L (ref 4.4–5.9)
RBC #/AREA URNS AUTO: 3 /HPF (ref 0–2)
SAO2 % BLDV FROM PO2: 16 %
SODIUM SERPL-SCNC: 128 MMOL/L (ref 133–144)
SOURCE: ABNORMAL
SP GR UR STRIP: 1.02 (ref 1–1.03)
SPECIMEN SOURCE: NORMAL
SQUAMOUS #/AREA URNS AUTO: <1 /HPF (ref 0–1)
UROBILINOGEN UR STRIP-MCNC: 4 MG/DL (ref 0–2)
WBC # BLD AUTO: 10.1 10E9/L (ref 4–11)
WBC #/AREA URNS AUTO: <1 /HPF (ref 0–5)

## 2019-09-16 PROCEDURE — 80053 COMPREHEN METABOLIC PANEL: CPT | Performed by: EMERGENCY MEDICINE

## 2019-09-16 PROCEDURE — 25000128 H RX IP 250 OP 636: Performed by: HOSPITALIST

## 2019-09-16 PROCEDURE — 40000275 ZZH STATISTIC RCP TIME EA 10 MIN

## 2019-09-16 PROCEDURE — 96365 THER/PROPH/DIAG IV INF INIT: CPT

## 2019-09-16 PROCEDURE — 12000000 ZZH R&B MED SURG/OB

## 2019-09-16 PROCEDURE — 87493 C DIFF AMPLIFIED PROBE: CPT | Performed by: EMERGENCY MEDICINE

## 2019-09-16 PROCEDURE — 25000128 H RX IP 250 OP 636: Performed by: EMERGENCY MEDICINE

## 2019-09-16 PROCEDURE — 99223 1ST HOSP IP/OBS HIGH 75: CPT | Mod: AI | Performed by: HOSPITALIST

## 2019-09-16 PROCEDURE — 25000132 ZZH RX MED GY IP 250 OP 250 PS 637: Mod: GY | Performed by: HOSPITALIST

## 2019-09-16 PROCEDURE — 94640 AIRWAY INHALATION TREATMENT: CPT

## 2019-09-16 PROCEDURE — 94640 AIRWAY INHALATION TREATMENT: CPT | Mod: 76

## 2019-09-16 PROCEDURE — 85025 COMPLETE CBC W/AUTO DIFF WBC: CPT | Performed by: EMERGENCY MEDICINE

## 2019-09-16 PROCEDURE — 83605 ASSAY OF LACTIC ACID: CPT | Performed by: HOSPITALIST

## 2019-09-16 PROCEDURE — 81001 URINALYSIS AUTO W/SCOPE: CPT | Performed by: EMERGENCY MEDICINE

## 2019-09-16 PROCEDURE — 87800 DETECT AGNT MULT DNA DIREC: CPT | Performed by: EMERGENCY MEDICINE

## 2019-09-16 PROCEDURE — 25800030 ZZH RX IP 258 OP 636: Performed by: HOSPITALIST

## 2019-09-16 PROCEDURE — 25000132 ZZH RX MED GY IP 250 OP 250 PS 637: Mod: GY | Performed by: EMERGENCY MEDICINE

## 2019-09-16 PROCEDURE — 87040 BLOOD CULTURE FOR BACTERIA: CPT | Performed by: EMERGENCY MEDICINE

## 2019-09-16 PROCEDURE — 71045 X-RAY EXAM CHEST 1 VIEW: CPT

## 2019-09-16 PROCEDURE — 25000125 ZZHC RX 250: Performed by: HOSPITALIST

## 2019-09-16 PROCEDURE — 87077 CULTURE AEROBIC IDENTIFY: CPT | Performed by: EMERGENCY MEDICINE

## 2019-09-16 PROCEDURE — 82803 BLOOD GASES ANY COMBINATION: CPT

## 2019-09-16 PROCEDURE — 87186 SC STD MICRODIL/AGAR DIL: CPT | Performed by: EMERGENCY MEDICINE

## 2019-09-16 PROCEDURE — 87086 URINE CULTURE/COLONY COUNT: CPT | Performed by: EMERGENCY MEDICINE

## 2019-09-16 PROCEDURE — 84145 PROCALCITONIN (PCT): CPT | Performed by: EMERGENCY MEDICINE

## 2019-09-16 PROCEDURE — 36415 COLL VENOUS BLD VENIPUNCTURE: CPT | Performed by: HOSPITALIST

## 2019-09-16 PROCEDURE — 99285 EMERGENCY DEPT VISIT HI MDM: CPT

## 2019-09-16 PROCEDURE — 83605 ASSAY OF LACTIC ACID: CPT

## 2019-09-16 RX ORDER — ACETAMINOPHEN 650 MG/1
650 SUPPOSITORY RECTAL ONCE
Status: COMPLETED | OUTPATIENT
Start: 2019-09-16 | End: 2019-09-16

## 2019-09-16 RX ORDER — HYDROCORTISONE 10 MG/1
10 TABLET ORAL
Status: DISCONTINUED | OUTPATIENT
Start: 2019-09-17 | End: 2019-09-20 | Stop reason: HOSPADM

## 2019-09-16 RX ORDER — BISACODYL 10 MG
10 SUPPOSITORY, RECTAL RECTAL DAILY PRN
Status: DISCONTINUED | OUTPATIENT
Start: 2019-09-16 | End: 2019-09-20 | Stop reason: HOSPADM

## 2019-09-16 RX ORDER — HYDROCORTISONE 20 MG/1
20 TABLET ORAL EVERY MORNING
Status: DISCONTINUED | OUTPATIENT
Start: 2019-09-17 | End: 2019-09-20 | Stop reason: HOSPADM

## 2019-09-16 RX ORDER — IPRATROPIUM BROMIDE AND ALBUTEROL SULFATE 2.5; .5 MG/3ML; MG/3ML
1 SOLUTION RESPIRATORY (INHALATION) EVERY 4 HOURS PRN
Status: DISCONTINUED | OUTPATIENT
Start: 2019-09-16 | End: 2019-09-20 | Stop reason: HOSPADM

## 2019-09-16 RX ORDER — ASPIRIN 81 MG/1
81 TABLET, CHEWABLE ORAL DAILY
Status: DISCONTINUED | OUTPATIENT
Start: 2019-09-17 | End: 2019-09-20 | Stop reason: HOSPADM

## 2019-09-16 RX ORDER — ACETAMINOPHEN 325 MG/1
650 TABLET ORAL EVERY 4 HOURS PRN
Status: DISCONTINUED | OUTPATIENT
Start: 2019-09-16 | End: 2019-09-17

## 2019-09-16 RX ORDER — SODIUM CHLORIDE 9 MG/ML
INJECTION, SOLUTION INTRAVENOUS CONTINUOUS
Status: DISCONTINUED | OUTPATIENT
Start: 2019-09-16 | End: 2019-09-18

## 2019-09-16 RX ORDER — CARBAMAZEPINE 100 MG/5ML
150 SUSPENSION ORAL EVERY 6 HOURS
Status: DISCONTINUED | OUTPATIENT
Start: 2019-09-16 | End: 2019-09-20 | Stop reason: HOSPADM

## 2019-09-16 RX ORDER — ONDANSETRON 2 MG/ML
4 INJECTION INTRAMUSCULAR; INTRAVENOUS EVERY 6 HOURS PRN
Status: DISCONTINUED | OUTPATIENT
Start: 2019-09-16 | End: 2019-09-20 | Stop reason: HOSPADM

## 2019-09-16 RX ORDER — ACETYLCYSTEINE 200 MG/ML
2 SOLUTION ORAL; RESPIRATORY (INHALATION) EVERY 6 HOURS
Status: DISCONTINUED | OUTPATIENT
Start: 2019-09-16 | End: 2019-09-20 | Stop reason: HOSPADM

## 2019-09-16 RX ORDER — PIPERACILLIN SODIUM, TAZOBACTAM SODIUM 4; .5 G/20ML; G/20ML
4.5 INJECTION, POWDER, LYOPHILIZED, FOR SOLUTION INTRAVENOUS EVERY 6 HOURS
Status: DISCONTINUED | OUTPATIENT
Start: 2019-09-16 | End: 2019-09-20 | Stop reason: HOSPADM

## 2019-09-16 RX ORDER — ACETAMINOPHEN 650 MG/1
650 SUPPOSITORY RECTAL EVERY 4 HOURS PRN
Status: DISCONTINUED | OUTPATIENT
Start: 2019-09-16 | End: 2019-09-20 | Stop reason: HOSPADM

## 2019-09-16 RX ORDER — LIDOCAINE 40 MG/G
CREAM TOPICAL
Status: DISCONTINUED | OUTPATIENT
Start: 2019-09-16 | End: 2019-09-20 | Stop reason: HOSPADM

## 2019-09-16 RX ORDER — ONDANSETRON 4 MG/1
4 TABLET, ORALLY DISINTEGRATING ORAL EVERY 6 HOURS PRN
Status: DISCONTINUED | OUTPATIENT
Start: 2019-09-16 | End: 2019-09-20 | Stop reason: HOSPADM

## 2019-09-16 RX ORDER — LANOLIN ALCOHOL/MO/W.PET/CERES
6 CREAM (GRAM) TOPICAL AT BEDTIME
Status: DISCONTINUED | OUTPATIENT
Start: 2019-09-16 | End: 2019-09-20 | Stop reason: HOSPADM

## 2019-09-16 RX ORDER — METOCLOPRAMIDE HYDROCHLORIDE 5 MG/5ML
5 SOLUTION ORAL 2 TIMES DAILY
Status: DISCONTINUED | OUTPATIENT
Start: 2019-09-17 | End: 2019-09-20 | Stop reason: HOSPADM

## 2019-09-16 RX ORDER — NALOXONE HYDROCHLORIDE 0.4 MG/ML
.1-.4 INJECTION, SOLUTION INTRAMUSCULAR; INTRAVENOUS; SUBCUTANEOUS
Status: DISCONTINUED | OUTPATIENT
Start: 2019-09-16 | End: 2019-09-20 | Stop reason: HOSPADM

## 2019-09-16 RX ORDER — ALBUTEROL SULFATE 5 MG/ML
2.5 SOLUTION RESPIRATORY (INHALATION)
Status: DISCONTINUED | OUTPATIENT
Start: 2019-09-16 | End: 2019-09-20 | Stop reason: HOSPADM

## 2019-09-16 RX ORDER — AMOXICILLIN 250 MG
2 CAPSULE ORAL 2 TIMES DAILY PRN
Status: DISCONTINUED | OUTPATIENT
Start: 2019-09-16 | End: 2019-09-17

## 2019-09-16 RX ORDER — PIPERACILLIN SODIUM, TAZOBACTAM SODIUM 4; .5 G/20ML; G/20ML
4.5 INJECTION, POWDER, LYOPHILIZED, FOR SOLUTION INTRAVENOUS ONCE
Status: COMPLETED | OUTPATIENT
Start: 2019-09-16 | End: 2019-09-16

## 2019-09-16 RX ORDER — LEVOTHYROXINE SODIUM 150 UG/1
150 TABLET ORAL EVERY MORNING
Status: DISCONTINUED | OUTPATIENT
Start: 2019-09-17 | End: 2019-09-17

## 2019-09-16 RX ORDER — AMOXICILLIN 250 MG
1 CAPSULE ORAL 2 TIMES DAILY PRN
Status: DISCONTINUED | OUTPATIENT
Start: 2019-09-16 | End: 2019-09-17

## 2019-09-16 RX ADMIN — ACETAMINOPHEN 650 MG: 650 SUPPOSITORY RECTAL at 17:14

## 2019-09-16 RX ADMIN — PIPERACILLIN SODIUM AND TAZOBACTAM SODIUM 4.5 G: 4; .5 INJECTION, POWDER, LYOPHILIZED, FOR SOLUTION INTRAVENOUS at 17:43

## 2019-09-16 RX ADMIN — SODIUM CHLORIDE, PRESERVATIVE FREE: 5 INJECTION INTRAVENOUS at 21:52

## 2019-09-16 RX ADMIN — PIPERACILLIN AND TAZOBACTAM 4.5 G: 4; .5 INJECTION, POWDER, FOR SOLUTION INTRAVENOUS at 23:35

## 2019-09-16 RX ADMIN — SODIUM CHLORIDE 1000 ML: 9 INJECTION, SOLUTION INTRAVENOUS at 17:52

## 2019-09-16 RX ADMIN — MELATONIN TAB 3 MG 6 MG: 3 TAB at 21:51

## 2019-09-16 RX ADMIN — CARBAMAZEPINE 150 MG: 100 SUSPENSION ORAL at 23:38

## 2019-09-16 RX ADMIN — ACETYLCYSTEINE 2 ML: 200 SOLUTION ORAL; RESPIRATORY (INHALATION) at 23:18

## 2019-09-16 RX ADMIN — SODIUM CHLORIDE 500 ML: 9 INJECTION, SOLUTION INTRAVENOUS at 22:39

## 2019-09-16 RX ADMIN — ACETAMINOPHEN 650 MG: 325 TABLET, FILM COATED ORAL at 21:51

## 2019-09-16 RX ADMIN — BRIVARACETAM 100 MG: 10 SOLUTION ORAL at 21:51

## 2019-09-16 RX ADMIN — IPRATROPIUM BROMIDE AND ALBUTEROL SULFATE 3 ML: .5; 3 SOLUTION RESPIRATORY (INHALATION) at 23:18

## 2019-09-16 RX ADMIN — SODIUM CHLORIDE 1000 ML: 9 INJECTION, SOLUTION INTRAVENOUS at 17:15

## 2019-09-16 RX ADMIN — POTASSIUM & SODIUM PHOSPHATES POWDER PACK 280-160-250 MG 1 PACKET: 280-160-250 PACK at 21:53

## 2019-09-16 ASSESSMENT — ENCOUNTER SYMPTOMS: FEVER: 1

## 2019-09-16 ASSESSMENT — MIFFLIN-ST. JEOR: SCORE: 1566.08

## 2019-09-16 NOTE — ED TRIAGE NOTES
"Patient arrived via EMS, \"nurse\" at his residence said that he has a fever of 103. No other information was provided to EMS. Patient was discharged yesterday for similar complaints.   "

## 2019-09-16 NOTE — ED NOTES
Bed: ED09  Expected date:   Expected time:   Means of arrival:   Comments:  417  59 M fever/ho pneumonia/TBI  1608

## 2019-09-16 NOTE — ED PROVIDER NOTES
History     Chief Complaint:  Fever    The history is limited by the condition of the patient.      Keyon Farias is a 59 year old male with a complex medical history of traumatic brain injury, panhypopituitarism, spastic hemiplegia, seizures, chronic dysphagia, upper extremity DVT, necrotizing pancreatitis, sepsis from pneumonia, UTI and septic shock who presents to the emergency department for evaluation of a fever. The patient was admitted to [facility] mid August for septic shock from UTI and  most recently admitted on 09/13/2019 for a SIRS and a fever without identified source. The patient was discharged 09/16/2019 with a prescription for Levaquin. Today the a nurse at the patient's group home called EMS after the patient was found to have a fever of 103F. He is otherwise unable to participate in hx.    Allergies:  Valproic Acid  Phenytoin    Medications:    Mucomyst  Proventil  Aspirin 81 mg  Briviact  Tegretol  Glycate  Cortef  Levaquin  Synthroid  Melatonin  Zofran  Protonix  Neutra-phos  Vitamin D3  Levaquin    Past Medical History:    Aphasia due to TBI  DVT  GERD  Panhypopituitarism  Pneumonia  Seizures  Septic shock  Spastic hemiplegia  Thyroid disease  Tracheostomy  TBI  Cerebral artery occlusion with infarction  UTI  Ventricular fibrillation  Ventricular tachycardia    Past Surgical History:    Endoscopic ultrasound  EGD combined x4  Head and neck surgery  Jejunostomy tube change x4  Picc exchange   Appendectomy  Tube insertion  Tracheostomy x2  Ortho surgery  Vascular surgery    Family History:    Cancer    Social History:  The patient was accompanied to the ED by EMS.  Smoking Status: Former  Smokeless Tobacco: Never  Alcohol Use: No  Drug Use: No  Marital Status:  Single      Review of Systems   Unable to perform ROS: Patient nonverbal   Constitutional: Positive for fever.   All other systems reviewed and are negative.        Physical Exam   Vitals:  Patient Vitals for the past 24 hrs:   BP Temp Temp  src Pulse Heart Rate Resp SpO2 Height Weight   09/16/19 1900 100/64 -- -- 103 -- -- 94 % -- --   09/16/19 1830 113/55 -- -- 102 -- -- 95 % -- --   09/16/19 1815 111/53 -- -- 105 -- -- 95 % -- --   09/16/19 1754 -- 100.5  F (38.1  C) Temporal -- -- -- -- 1.829 m (6') 70.3 kg (155 lb)   09/16/19 1745 102/77 -- -- 101 -- -- 97 % -- --   09/16/19 1730 109/52 -- -- 105 -- -- 96 % -- --   09/16/19 1715 115/61 -- -- 109 -- -- 96 % -- --   09/16/19 1650 120/71 (!) 105.3  F (40.7  C) Rectal -- 114 26 99 % -- --   09/16/19 1645 120/71 -- -- 102 -- -- -- -- --     Physical Exam  General/Appearance: appears stated age, moaning, looking around/alert, very warm to the touch  Eyes: EOMI, no scleral injection, no icterus  ENT: MMM  Neck: supple, nl ROM, no stiffness  Cardiovascular: tachy but regular, nl S1S2, no m/r/g, 2+ pulses in all 4 extremities, cap refill <2sec  Respiratory: mild tachypnea, unable to take deep breaths, rhonchi  Back: no lesions  GI: abd soft, non-distended, no obvious ttp, feeding tube in place,  no HSM, no rebound, no guarding, nl BS, covered in stool  MSK: spasticity  Skin: warm and well-perfused, erythema to buttock, no edema, no ecchymosis, nl turgor  Neuro:  alert  Psych: deferred  Heme: no petechia, no purpura, no active bleeding        Emergency Department Course     Imaging:  Radiographic findings were communicated with the family who voiced understanding of the findings.    XR Chest Port 1 view  Increasing patchy opacification right lung consistent with  worsening pneumonia. Left lung grossly clear. Heart and pulmonary  vessels within normal limits.  Reading per radiology.    Laboratory:  CBC: HGB 12.8 (L) o/w WNL. (WBC 10.1, )   CMP:  (L) Chloride 93 (L) Glucose 114 (H) Albumin 3.1 (L) o/w AWNL (Creatinine 0.85)    UA with micro: pH 8.5 (H) Protein Albumin o/w negative  ISTAT gases lactate gabe POCT: pH: 7.36, PCO2: 49, PO2: 14 (L), Bicarbonate: 28, O2 Sat: 16, Lactic acid: 2.5  (H)  Procalcitonin: 0.20    Blood Culture x2: Pending  Urine Culture: Pending   Clostridium Difficile Toxin B PCR: Pending    Interventions:  1714 Tylenol 650 mg Rectal  1715 NS, 1 L, IV bolus  1743 Zosyn 4.5 mg Iv  1752 NS, 1 L, IV bolus    Emergency Department Course:  Nursing notes and vitals reviewed. 1634 I performed an exam of the patient as documented above.     IV inserted. Medicine administered as documented above. Blood drawn. This was sent to the lab for further testing, results above.    The patient was sent for a chest XR while in the emergency department, findings above.     1931 I rechecked the patient and discussed the results of his workup thus far.     1959 I spoke with Dr. Claros, hospitalist, who agreed to admit the patient.    Findings and plan explained to the mother who consents to observation or admission. Discussed the patient with Dr. Claros, who will place the patient in med bed for further monitoring, evaluation, and treatment.    I personally reviewed the laboratory results with the mother and answered all related questions prior to admission.    Impression & Plan      Medical Decision Making:  This patient is a 59-year-old male with history of TBI, spastic hemiplegia, chronic dysphasia and aspiration with feeding tube in place, as well as recurrent infections who presents today with a high fever.  He was over 105 today in the ER.  He did have episodes of emesis earlier today and likely aspiration.  He also has diarrhea but recently has been on Levaquin.  Chest x-ray suggests he may have abutting pneumonia.  We will cover him with Zosyn for aspiration pneumonia.  Labs are consistent with severe sepsis.  He is getting IV hydration.  Cath urine is negative.  We did get stool and send for C. difficile.  Is likely, though, that this diarrhea is more side effect of the recent antibiotics.  He does not have any obvious skin source.  He does not appear to have abdominal pain with palpation.  He  will come into the hospitalist service for continued management and care.  Diagnosis:    ICD-10-CM    1. Severe sepsis (H) A41.9     R65.20        Disposition:  Admitted to Dr. Claros for a med bed.    I, Steve Hernández, am serving as a scribe on 9/16/2019 at 5:07 PM to personally document services performed by Kristi Carrizales* based on my observations and the provider's statements to me.     Steve Hernández  9/16/2019    EMERGENCY DEPARTMENT       Kristi Carrizales MD  09/16/19 6055

## 2019-09-17 ENCOUNTER — APPOINTMENT (OUTPATIENT)
Dept: CT IMAGING | Facility: CLINIC | Age: 59
DRG: 871 | End: 2019-09-17
Attending: INTERNAL MEDICINE
Payer: MEDICARE

## 2019-09-17 LAB
ANION GAP SERPL CALCULATED.3IONS-SCNC: 8 MMOL/L (ref 3–14)
BACTERIA SPEC CULT: NO GROWTH
BUN SERPL-MCNC: 12 MG/DL (ref 7–30)
CALCIUM SERPL-MCNC: 7.5 MG/DL (ref 8.5–10.1)
CHLORIDE SERPL-SCNC: 102 MMOL/L (ref 94–109)
CO2 SERPL-SCNC: 22 MMOL/L (ref 20–32)
CREAT SERPL-MCNC: 0.85 MG/DL (ref 0.66–1.25)
ERYTHROCYTE [DISTWIDTH] IN BLOOD BY AUTOMATED COUNT: 14.3 % (ref 10–15)
GFR SERPL CREATININE-BSD FRML MDRD: >90 ML/MIN/{1.73_M2}
GLUCOSE SERPL-MCNC: 87 MG/DL (ref 70–99)
HCT VFR BLD AUTO: 29.4 % (ref 40–53)
HGB BLD-MCNC: 9.7 G/DL (ref 13.3–17.7)
MAGNESIUM SERPL-MCNC: 1.9 MG/DL (ref 1.6–2.3)
MCH RBC QN AUTO: 28.4 PG (ref 26.5–33)
MCHC RBC AUTO-ENTMCNC: 33 G/DL (ref 31.5–36.5)
MCV RBC AUTO: 86 FL (ref 78–100)
PHOSPHATE SERPL-MCNC: 2.5 MG/DL (ref 2.5–4.5)
PLATELET # BLD AUTO: 121 10E9/L (ref 150–450)
POTASSIUM SERPL-SCNC: 3.8 MMOL/L (ref 3.4–5.3)
RBC # BLD AUTO: 3.42 10E12/L (ref 4.4–5.9)
SODIUM SERPL-SCNC: 132 MMOL/L (ref 133–144)
SPECIMEN SOURCE: NORMAL
WBC # BLD AUTO: 7.7 10E9/L (ref 4–11)

## 2019-09-17 PROCEDURE — 25000125 ZZHC RX 250: Performed by: INTERNAL MEDICINE

## 2019-09-17 PROCEDURE — 25800030 ZZH RX IP 258 OP 636: Performed by: HOSPITALIST

## 2019-09-17 PROCEDURE — 99232 SBSQ HOSP IP/OBS MODERATE 35: CPT | Performed by: INTERNAL MEDICINE

## 2019-09-17 PROCEDURE — 85027 COMPLETE CBC AUTOMATED: CPT | Performed by: HOSPITALIST

## 2019-09-17 PROCEDURE — 94640 AIRWAY INHALATION TREATMENT: CPT | Mod: 76

## 2019-09-17 PROCEDURE — 80048 BASIC METABOLIC PNL TOTAL CA: CPT | Performed by: HOSPITALIST

## 2019-09-17 PROCEDURE — 25000128 H RX IP 250 OP 636: Performed by: HOSPITALIST

## 2019-09-17 PROCEDURE — 25000132 ZZH RX MED GY IP 250 OP 250 PS 637: Mod: GY | Performed by: HOSPITALIST

## 2019-09-17 PROCEDURE — 25000125 ZZHC RX 250: Performed by: HOSPITALIST

## 2019-09-17 PROCEDURE — 71260 CT THORAX DX C+: CPT

## 2019-09-17 PROCEDURE — 40000275 ZZH STATISTIC RCP TIME EA 10 MIN

## 2019-09-17 PROCEDURE — 84100 ASSAY OF PHOSPHORUS: CPT | Performed by: HOSPITALIST

## 2019-09-17 PROCEDURE — 36415 COLL VENOUS BLD VENIPUNCTURE: CPT | Performed by: HOSPITALIST

## 2019-09-17 PROCEDURE — 94640 AIRWAY INHALATION TREATMENT: CPT

## 2019-09-17 PROCEDURE — 83735 ASSAY OF MAGNESIUM: CPT | Performed by: HOSPITALIST

## 2019-09-17 PROCEDURE — G0463 HOSPITAL OUTPT CLINIC VISIT: HCPCS

## 2019-09-17 PROCEDURE — 12000000 ZZH R&B MED SURG/OB

## 2019-09-17 PROCEDURE — 74177 CT ABD & PELVIS W/CONTRAST: CPT

## 2019-09-17 RX ORDER — LEVOTHYROXINE SODIUM 150 UG/1
150 TABLET ORAL EVERY MORNING
Status: DISCONTINUED | OUTPATIENT
Start: 2019-09-18 | End: 2019-09-20 | Stop reason: HOSPADM

## 2019-09-17 RX ORDER — GUAR GUM
1 PACKET (EA) ORAL DAILY
Status: DISCONTINUED | OUTPATIENT
Start: 2019-09-17 | End: 2019-09-20 | Stop reason: HOSPADM

## 2019-09-17 RX ORDER — AMINO AC/PROTEIN HYDR/WHEY PRO 10G-100/30
1 LIQUID (ML) ORAL 2 TIMES DAILY
Status: DISCONTINUED | OUTPATIENT
Start: 2019-09-17 | End: 2019-09-20 | Stop reason: HOSPADM

## 2019-09-17 RX ORDER — LEVOTHYROXINE SODIUM 150 UG/1
150 TABLET ORAL EVERY MORNING
Status: DISCONTINUED | OUTPATIENT
Start: 2019-09-18 | End: 2019-09-17

## 2019-09-17 RX ORDER — MUPIROCIN 20 MG/G
OINTMENT TOPICAL 3 TIMES DAILY
Status: DISCONTINUED | OUTPATIENT
Start: 2019-09-17 | End: 2019-09-20 | Stop reason: HOSPADM

## 2019-09-17 RX ORDER — AMOXICILLIN 250 MG
2 CAPSULE ORAL 2 TIMES DAILY PRN
Status: DISCONTINUED | OUTPATIENT
Start: 2019-09-17 | End: 2019-09-20 | Stop reason: HOSPADM

## 2019-09-17 RX ORDER — AMOXICILLIN 250 MG
1 CAPSULE ORAL 2 TIMES DAILY PRN
Status: DISCONTINUED | OUTPATIENT
Start: 2019-09-17 | End: 2019-09-20 | Stop reason: HOSPADM

## 2019-09-17 RX ORDER — IOPAMIDOL 755 MG/ML
78 INJECTION, SOLUTION INTRAVASCULAR ONCE
Status: COMPLETED | OUTPATIENT
Start: 2019-09-17 | End: 2019-09-17

## 2019-09-17 RX ADMIN — Medication 1 PACKET: at 10:58

## 2019-09-17 RX ADMIN — PIPERACILLIN AND TAZOBACTAM 4.5 G: 4; .5 INJECTION, POWDER, FOR SOLUTION INTRAVENOUS at 12:18

## 2019-09-17 RX ADMIN — METOCLOPRAMIDE 5 MG: 5 SOLUTION ORAL at 21:29

## 2019-09-17 RX ADMIN — SODIUM CHLORIDE 64 ML: 9 INJECTION, SOLUTION INTRAVENOUS at 17:40

## 2019-09-17 RX ADMIN — PIPERACILLIN AND TAZOBACTAM 4.5 G: 4; .5 INJECTION, POWDER, FOR SOLUTION INTRAVENOUS at 17:51

## 2019-09-17 RX ADMIN — METOCLOPRAMIDE 5 MG: 5 SOLUTION ORAL at 09:14

## 2019-09-17 RX ADMIN — SODIUM CHLORIDE, PRESERVATIVE FREE: 5 INJECTION INTRAVENOUS at 15:59

## 2019-09-17 RX ADMIN — POTASSIUM & SODIUM PHOSPHATES POWDER PACK 280-160-250 MG 1 PACKET: 280-160-250 PACK at 21:29

## 2019-09-17 RX ADMIN — BRIVARACETAM 100 MG: 10 SOLUTION ORAL at 21:29

## 2019-09-17 RX ADMIN — Medication 1 PACKET: at 10:57

## 2019-09-17 RX ADMIN — MICONAZOLE NITRATE: 20 POWDER TOPICAL at 09:15

## 2019-09-17 RX ADMIN — LEVOTHYROXINE SODIUM 150 MCG: 150 TABLET ORAL at 09:14

## 2019-09-17 RX ADMIN — SODIUM CHLORIDE, PRESERVATIVE FREE: 5 INJECTION INTRAVENOUS at 04:13

## 2019-09-17 RX ADMIN — MICONAZOLE NITRATE: 20 POWDER TOPICAL at 21:30

## 2019-09-17 RX ADMIN — MUPIROCIN: 20 OINTMENT TOPICAL at 15:59

## 2019-09-17 RX ADMIN — HYDROCORTISONE 20 MG: 20 TABLET ORAL at 09:14

## 2019-09-17 RX ADMIN — POTASSIUM & SODIUM PHOSPHATES POWDER PACK 280-160-250 MG 1 PACKET: 280-160-250 PACK at 09:14

## 2019-09-17 RX ADMIN — IPRATROPIUM BROMIDE AND ALBUTEROL SULFATE 3 ML: .5; 3 SOLUTION RESPIRATORY (INHALATION) at 03:50

## 2019-09-17 RX ADMIN — POTASSIUM & SODIUM PHOSPHATES POWDER PACK 280-160-250 MG 1 PACKET: 280-160-250 PACK at 15:59

## 2019-09-17 RX ADMIN — ACETYLCYSTEINE 2 ML: 200 SOLUTION ORAL; RESPIRATORY (INHALATION) at 12:12

## 2019-09-17 RX ADMIN — MUPIROCIN: 20 OINTMENT TOPICAL at 21:30

## 2019-09-17 RX ADMIN — ACETYLCYSTEINE 2 ML: 200 SOLUTION ORAL; RESPIRATORY (INHALATION) at 18:42

## 2019-09-17 RX ADMIN — MINERAL SUPPLEMENT IRON 300 MG / 5 ML STRENGTH LIQUID 100 PER BOX UNFLAVORED 220 MG: at 09:15

## 2019-09-17 RX ADMIN — Medication 1 PACKET: at 21:29

## 2019-09-17 RX ADMIN — IPRATROPIUM BROMIDE AND ALBUTEROL SULFATE 3 ML: .5; 3 SOLUTION RESPIRATORY (INHALATION) at 08:55

## 2019-09-17 RX ADMIN — IOPAMIDOL 78 ML: 755 INJECTION, SOLUTION INTRAVENOUS at 17:39

## 2019-09-17 RX ADMIN — PIPERACILLIN AND TAZOBACTAM 4.5 G: 4; .5 INJECTION, POWDER, FOR SOLUTION INTRAVENOUS at 06:03

## 2019-09-17 RX ADMIN — IPRATROPIUM BROMIDE AND ALBUTEROL SULFATE 3 ML: .5; 3 SOLUTION RESPIRATORY (INHALATION) at 12:12

## 2019-09-17 RX ADMIN — IPRATROPIUM BROMIDE AND ALBUTEROL SULFATE 3 ML: .5; 3 SOLUTION RESPIRATORY (INHALATION) at 16:15

## 2019-09-17 RX ADMIN — ACETYLCYSTEINE 2 ML: 200 SOLUTION ORAL; RESPIRATORY (INHALATION) at 03:50

## 2019-09-17 RX ADMIN — Medication 40 MG: at 09:14

## 2019-09-17 RX ADMIN — MELATONIN TAB 3 MG 6 MG: 3 TAB at 21:30

## 2019-09-17 RX ADMIN — ACETYLCYSTEINE 2 ML: 200 SOLUTION ORAL; RESPIRATORY (INHALATION) at 08:55

## 2019-09-17 RX ADMIN — HYDROCORTISONE 10 MG: 10 TABLET ORAL at 16:00

## 2019-09-17 RX ADMIN — MICONAZOLE NITRATE: 20 POWDER TOPICAL at 04:01

## 2019-09-17 RX ADMIN — BRIVARACETAM 100 MG: 10 SOLUTION ORAL at 09:14

## 2019-09-17 RX ADMIN — CARBAMAZEPINE 150 MG: 100 SUSPENSION ORAL at 12:18

## 2019-09-17 RX ADMIN — IPRATROPIUM BROMIDE AND ALBUTEROL SULFATE 3 ML: .5; 3 SOLUTION RESPIRATORY (INHALATION) at 18:42

## 2019-09-17 RX ADMIN — CARBAMAZEPINE 150 MG: 100 SUSPENSION ORAL at 17:51

## 2019-09-17 RX ADMIN — ASPIRIN 81 MG 81 MG: 81 TABLET ORAL at 09:14

## 2019-09-17 RX ADMIN — CARBAMAZEPINE 150 MG: 100 SUSPENSION ORAL at 06:03

## 2019-09-17 ASSESSMENT — ACTIVITIES OF DAILY LIVING (ADL)
TOILETING: 4-->COMPLETELY DEPENDENT
RETIRED_EATING: 4-->COMPLETELY DEPENDENT
ADLS_ACUITY_SCORE: 44
TRANSFERRING: 4-->COMPLETELY DEPENDENT
ADLS_ACUITY_SCORE: 45
AMBULATION: 4-->COMPLETELY DEPENDENT
DRESS: 4-->COMPLETELY DEPENDENT
BATHING: 4-->COMPLETELY DEPENDENT
SWALLOWING: 2-->DIFFICULTY SWALLOWING LIQUIDS/FOODS
COGNITION: 0 - NO COGNITION ISSUES REPORTED
ADLS_ACUITY_SCORE: 39
ADLS_ACUITY_SCORE: 44
ADLS_ACUITY_SCORE: 45
FALL_HISTORY_WITHIN_LAST_SIX_MONTHS: NO
ADLS_ACUITY_SCORE: 45
RETIRED_COMMUNICATION: 3-->UNABLE TO SPEAK (NOT RELATED TO LANGUAGE BARRIER)

## 2019-09-17 NOTE — PROGRESS NOTES
[facility] Nurse Inpatient Skin Assessment     Initial Assessment of:   Buttocks/ coccyx/ sacrum        Data:   Patient History:      per MD note(s):  complicated history of frequent infections and sepsis. Admitted 8/12/19-8/16/19 for septic shock related to UTI, discharged on 7 day course of IV Levaquin which he completed mid August. Had fever of 104 9/13 which prompted an observation admission, respiratory virus panel was negative at that time and no source of fever found. Discharged on PO levaquin on 9/15/19. In the past 24 hours, he was a little more tired, did not go to his program, but otherwise was no different. Mother checked his temperature and saw temp of 103 at home and called EMS. He then vomited and likely aspirated per his mother. She has not noticed any other new or worrisome symptoms. Lactic acid 2.5 (trended to 1.5 after IVF), WBC 10.1, procalcitonin 0.2. UA bland. CXR with increasing patchy opacity at right lung. ED noted fever of 105.3. Sepsis criteria met with temp and HR >100     Ricci Risk Assessment  Sensory Perception: 3-->slightly limited    Moisture: 3-->occasionally moist   Activity: 1-->bedfast     Mobility: 2-->very limited   Nutrition: 3-->adequate   Ricci Score: 14    Positioning: Pillows,     Mattress:  Standard , Atmos Air mattress    Moisture Management:  Incontinence Protocol and Diaper    Catheter secured? Not applicable    Current Diet / Nutrition:       None        Labs:   Recent Labs   Lab Test 09/17/19  0836 09/16/19  1713  08/12/19  2200  01/31/19  1324  02/07/17  0452   ALBUMIN  --  3.1*   < >  --    < > 2.9*   < >  --    HGB 9.7* 12.8*   < >  --    < > 12.0*   < > 9.4*   INR  --   --   --   --   --   --   --  1.27*   WBC 7.7 10.1   < >  --    < > 13.6*   < > 11.7*   A1C  --   --   --  6.1*   < >  --    < >  --    CRP  --   --   --   --   --  71.1*  --   --     < > = values in this interval not displayed.         Skin Assessment (location):    Buttocks, sacrum, coccyx    Skin: mostly intact looking with a large patch of bright but dark red, moist tissue centered along gluteal cleft to bilateral fleshy buttocks, satellite lesions with similar coloration.  He does have a couple of small, 0.3cm x 02.cmx 0.2cm wounds scattered throughout, Tissue is very tender with cares    ,September 17, 2019                Intervention:     Patient's chart evaluated.      Assessed: buttocks with RN as noted above    Orders  Written    Supplies  Reviewed and discussed with pt and RN    Discussed plan of care with Patient and Nurse and Dr Champion- arjuned ordering mupurocin Cream for buttocks          All patient / family questions answered:  YES        Assessment:        Tissue on buttocks looks like a bacterial infection plus a fungal infection.  Will use mupurocin cream plus antifungal powder and barrier cream for treatment, see plan          Plan:   Nursing to notify the Provider(s) and re-consult the [facility] Nurse if skin deteriorate(s).    Skin care plan to buttocks: TID and prn  1. Provide thoroughly perineal cares- using Rita Cleanse and Protect  2. Lightly rub in mupurocin cream to the reddened/ effected tissue  3. Dust with antifungal powder, lightly rub in  4.  Apply CriticAid Paste over the medications to help seal into place  - for perineal cares at other times try to not disturb the creams and medications, cleaning around them as much as able.  - leave brief on loose or off when in bed  - keep pt positioned side to side at all times, q 1-2 hours  - keep heels elevated at all times, one pillow under each leg, and assure heels floating    - recommend low air loss mattress    WO Nurse will return: weekly and prn

## 2019-09-17 NOTE — PROGRESS NOTES
RECEIVING UNIT ED HANDOFF REVIEW    ED Nurse Handoff Report was reviewed by: Tony Chou RN on September 16, 2019 at 8:29 PM

## 2019-09-17 NOTE — PROGRESS NOTES
[facility]    Hospitalist Progress Note    Date of Service (when I saw the patient): 09/17/2019    Assessment & Plan   Keyon Farias is a 59 year old male who was admitted on 9/16/2019.  Assessment & Plan     Keyon Farias is a 59 year old male who presents with high fevers in setting of likely aspiration pneumonia     Fever  Sepsis likely secondary to recurrent aspiration pneumonia  Mr Farias has a complicated history of frequent infections and sepsis. Admitted 8/12/19-8/16/19 for septic shock related to UTI, discharged on 7 day course of IV Levaquin which he completed mid August. Had fever of 104 9/13 which prompted an observation admission, respiratory virus panel was negative at that time and no source of fever found. Discharged on PO levaquin on 9/15/19. In the past 24 hours, he was a little more tired, did not go to his program, but otherwise was no different. Mother checked his temperature and saw temp of 103 at home and called EMS. He then vomited and likely aspirated per his mother. She has not noticed any other new or worrisome symptoms. Lactic acid 2.5 (trended to 1.5 after IVF), WBC 10.1, procalcitonin 0.2. UA bland. CXR with increasing patchy opacity at right lung. ED noted fever of 105.3. Sepsis criteria met with temp and HR >100  - Admit inpatient  - ID consult  - C diff ordered in ED due to loose stools, but per mother his stools are at his baseline (loose due to tube feeds)  - Continue zosyn for presumptive aspiration pneumonia--doubt we can get a good sputum sample from him  - Follow blood cultures NGTD so far   - Trend fevers.fevers coming down since admission      Hyponatremia  Has hx mild hyponatremia, was 136 on 9/15. Received IVF resuscitation in the ED.   Sodium improved from 128 to 132     Thrombocytopenia, improved  Noted during 8/2019 during admission for sepsis. Improved from 9/15 to today with 116--153-121 today   Stable        Seizure disorder  PTA Tegretol and Briviact,  continue     Panhypopituitarism  PTA hydrocortisone 20 mg in morning and 10 mg at night, continued     Hypothyroidism  PTA Synthroid, continued     Gonadotropin, hypogonadism.   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 Not interested in placement. Will not consult PT/OT for patient     History of enlarging pancreatic tail cyst  History of necrotizing pancreatitis requiring cystogastrostomy with a stent placement and subsequent removal.  During one of his recent hospitalizations, a CT was noted with enlarging cyst.  This was evaluated by Dr. Davis from GI during last hospitalization, was suggested a followup CT scan in 6 months (5/2019)      DVT Prophylaxis: Pneumatic Compression Devices  Code Status: Full Code  Expected discharge: 2 - 3 days, recommended to prior living arrangement once fevers resolved    Starr Champion MD  [phone] (P)      Interval History   Got discharge day before yesterday. Got readmitted yesterday due to fevers . Afebrile since this AM. Resting comfortably in bed. Pt is mostly nonverbal so history is limited     -Data reviewed today: I reviewed all new labs and imaging results over the last 24 hours. I personally reviewed no images or EKG's today.    Physical Exam   Temp: 97.5  F (36.4  C) Temp src: Oral BP: 90/48 Pulse: 74 Heart Rate: 88 Resp: 18 SpO2: 91 % O2 Device: None (Room air)    Vitals:    09/16/19 1754   Weight: 70.3 kg (155 lb)     Vital Signs with Ranges  Temp:  [97.5  F (36.4  C)-105.3  F (40.7  C)] 97.5  F (36.4  C)  Pulse:  [] 74  Heart Rate:  [] 88  Resp:  [18-30] 18  BP: ()/(38-77) 90/48  SpO2:  [91 %-99 %] 91 %  No intake/output data recorded.    Constitutional: Awake, alert, cooperative, no apparent distress  Respiratory: Clear to auscultation bilaterally, no  crackles or wheezing  Cardiovascular: Regular rate and rhythm, normal S1 and S2, and no murmur noted  GI: Normal bowel sounds, soft, non-distended, non-tender  Skin/Integumen: No rashes, no cyanosis, no edema  Other:     Medications     IV fluid REPLACEMENT ONLY       sodium chloride 100 mL/hr at 09/17/19 0413       acetylcysteine  2 mL Nebulization Q6H     albuterol  2.5 mg Nebulization Q4H While awake     aspirin  81 mg Oral or Feeding Tube Daily     Brivaracetam  100 mg Oral or Feeding Tube BID     carBAMazepine  150 mg Oral or Feeding Tube Q6H     ferrous sulfate  220 mg Per Feeding Tube Daily     fiber modular (NUTRISOURCE FIBER)  1 packet Per Feeding Tube Daily     hydrocortisone  10 mg Oral or Feeding Tube Daily with supper     hydrocortisone  20 mg Oral or Feeding Tube QAM     [START ON 9/18/2019] levothyroxine  150 mcg Per Feeding Tube QAM     melatonin  6 mg Per Feeding Tube At Bedtime     metoclopramide  5 mg Per Feeding Tube BID     miconazole   Topical BID     pantoprazole  40 mg Per J Tube Daily     piperacillin-tazobactam  4.5 g Intravenous Q6H     potassium & sodium phosphates  1 packet Per Feeding Tube TID     protein modular  1 packet Per Feeding Tube BID     sodium chloride (PF)  3 mL Intracatheter Q8H       Data   Recent Labs   Lab 09/17/19  0836 09/16/19  1713 09/15/19  0818 09/15/19  0613 09/14/19  0636 09/13/19 2009   WBC 7.7 10.1 8.9  --  16.2* 13.0*   HGB 9.7* 12.8*  --   --  10.9* 12.6*   MCV 86 86  --   --  84 85   * 153  --  116* 112* 137*   * 128*  --  136 129* 130*   POTASSIUM 3.8 4.2  --   --  4.2 4.5   CHLORIDE 102 93*  --   --  98 95   CO2 22 29  --   --  27 30   BUN 12 14  --   --  12 15   CR 0.85 0.85  --  0.69 0.75 0.64*   ANIONGAP 8 6  --   --  4 5   STEVE 7.5* 8.7  --   --  7.6* 8.8   GLC 87 114*  --   --  84 76   ALBUMIN  --  3.1*  --   --   --  3.4   PROTTOTAL  --  8.3  --   --   --  8.4   BILITOTAL  --  0.3  --   --   --  0.2   ALKPHOS  --  91  --   --   --  113    ALT  --  28  --   --   --  30   AST  --  25  --   --   --  19       Recent Results (from the past 24 hour(s))   XR Chest Port 1 View    Narrative    CHEST ONE VIEW PORTABLE   9/16/2019 6:58 PM     HISTORY: Fever, history aspiration pneumonia    COMPARISON: 9/13/2018 chest x-ray      Impression    IMPRESSION: Increasing patchy opacification right lung consistent with  worsening pneumonia. Left lung grossly clear. Heart and pulmonary  vessels within normal limits.    LAWRENCE CATES MD

## 2019-09-17 NOTE — PLAN OF CARE
DATE & TIME: 9/17/2019 1762-0614    Cognitive Concerns/ Orientation : HECTOR, Non-verbal    BEHAVIOR & AGGRESSION TOOL COLOR: Green, calm and cooperative  CIWA SCORE: N/A   ABNL VS/O2: VSS on RA except BP soft 90/50's  MOBILITY: Total cares. Turn and reposition q2hrs  PAIN MANAGMENT: Denies, appears comfortable  DIET: NPO TF ran from 1100 to 1900 at 100 ml/hr. To be started tomorrow 2716-4001.   BOWEL/BLADDER: Incontinent to bladder and bowel, loose stools- cdiff negative and enteric isolation discontinued  ABNL LAB/BG: Na 132, Hgb down to 9.7 (MD aware, no s/s bleeding, likely from IVF)  DRAIN/DEVICES: PIV running NS at 100 mL/hr   TELEMETRY RHYTHM: NSR  SKIN: nonblanchable redness coccyx- WOC saw patient, plan for miconazole powder, barrier cream and antibacterial cream per orders. Groin is pink/reddened-painful with cares, Miconazole powder applied.   TESTS/PROCEDURES: CT chest- results pending  D/C DAY/GOALS/PLACE: Discharge pending progress 1-2 days  OTHER IMPORTANT INFO: LS diminished. Infrequent nonproductive cough. Continue IV zosyn and nebs. ID following.

## 2019-09-17 NOTE — H&P
[facility]    History and Physical  Hospitalist       Date of Admission:  9/16/2019    Assessment & Plan   Keyon Farias is a 59 year old male who presents with high fevers in setting of likely aspiration pneumonia    Fever  Sepsis likely secondary to recurrent aspiration pneumonia  Mr Farias has a complicated history of frequent infections and sepsis. Admitted 8/12/19-8/16/19 for septic shock related to UTI, discharged on 7 day course of IV Levaquin which he completed mid August. Had fever of 104 9/13 which prompted an observation admission, respiratory virus panel was negative at that time and no source of fever found. Discharged on PO levaquin on 9/15/19. In the past 24 hours, he was a little more tired, did not go to his program, but otherwise was no different. Mother checked his temperature and saw temp of 103 at home and called EMS. He then vomited and likely aspirated per his mother. She has not noticed any other new or worrisome symptoms. Lactic acid 2.5 (trended to 1.5 after IVF), WBC 10.1, procalcitonin 0.2. UA bland. CXR with increasing patchy opacity at right lung. ED noted fever of 105.3. Sepsis criteria met with temp and HR >100  - Admit inpatient  - ID consult  - C diff ordered in ED due to loose stools, but per mother his stools are at his baseline (loose due to tube feeds)  - Continue zosyn for presumptive aspiration pneumonia--doubt we can get a good sputum sample from him  - Follow blood cultures  - Trend fevers.  - Repeat procalcitonin in 2 days  - IVF @100ml/hr overnight  - Give additional 500ml IVF bolus now for low BP     Hyponatremia  Has hx mild hyponatremia, was 136 on 9/15. Received IVF resuscitation in the ED.   - Repeat BMP in AM     Thrombocytopenia, improved  Noted during 8/2019 during admission for sepsis. Improved from 9/15 to today with 116--153  - Monitor in AM     Seizure disorder  PTA Tegretol and Briviact, continue     Panhypopituitarism  PTA hydrocortisone 20 mg  in morning and 10 mg at night, continued     Hypothyroidism  PTA Synthroid, continued     Gonadotropin, hypogonadism.   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 Not interested in placement. Will not consult PT/OT for patient     History of enlarging pancreatic tail cyst  History of necrotizing pancreatitis requiring cystogastrostomy with a stent placement and subsequent removal.  During one of his recent hospitalizations, a CT was noted with enlarging cyst.  This was evaluated by Dr. Davis from GI during last hospitalization, was suggested a followup CT scan in 6 months (5/2019)     DVT Prophylaxis: Pneumatic Compression Devices  Code Status: Full Code  Expected discharge: 2 - 3 days, recommended to prior living arrangement once fevers resolved    Keeley Claros DO    Primary Care Physician   Carlos Gomez    Chief Complaint   Fever    History is obtained from the patient's mother    History of Present Illness   Keyon Farias is a 59 year old male who presents with fever after being discharged yesterday. Mother reports no changes in patient's behavior other than maybe being a little more tired yesterday after discharge. No change in sputum, diarrhea, cough, urinary symptoms. He received levaquin at midnight on 9/15. He is unable to provide further HPI or ROS.    Past Medical History    I have reviewed this patient's medical history and updated it with pertinent information if needed.   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)     Patient has little porductive speech but at baseline can understand simple commands consistently     DVT of upper extremity (deep vein thrombosis) (H)      Gastro-oesophageal reflux disease      Panhypopituitarism (H)     Secondary to Traumatic  Brain Injury      Pneumonia      Seizures (H)     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     Septic shock (H)      Spastic hemiplegia affecting dominant side (H)     related to wil injury     Thyroid disease      Tracheostomy care (H)      Traumatic brain injury (H) 1989     Unspecified cerebral artery occlusion with cerebral infarction 1989     UTI (urinary tract infection)      Ventricular fibrillation (H)      Ventricular tachyarrhythmia (H)        Past Surgical History   I have reviewed this patient's surgical history and updated it with pertinent information if needed.  Past Surgical History:   Procedure Laterality Date     ENDOSCOPIC ULTRASOUND UPPER GASTROINTESTINAL TRACT (GI) N/A 1/30/2017    Procedure: ENDOSCOPIC ULTRASOUND, ESOPHAGOSCOPY / UPPER GASTROINTESTINAL TRACT (GI);  Surgeon: Jus Montana MD;  Location: [facility] OR     ENDOSCOPIC ULTRASOUND, ESOPHAGOSCOPY, GASTROSCOPY, DUODENOSCOPY (EGD), NECROSECTOMY N/A 2/7/2017    Procedure: ENDOSCOPIC ULTRASOUND, ESOPHAGOSCOPY, GASTROSCOPY, DUODENOSCOPY (EGD), NECROSECTOMY;  Surgeon: Jack Marcus MD;  Location:  OR     ESOPHAGOSCOPY, GASTROSCOPY, DUODENOSCOPY (EGD), COMBINED  3/13/2014    Procedure: COMBINED ESOPHAGOSCOPY, GASTROSCOPY, DUODENOSCOPY (EGD), BIOPSY SINGLE OR MULTIPLE;  gastroscopy;  Surgeon: Digna Rhodes MD;  Location: [facility]     ESOPHAGOSCOPY, GASTROSCOPY, DUODENOSCOPY (EGD), COMBINED N/A 12/6/2016    Procedure: COMBINED ESOPHAGOSCOPY, GASTROSCOPY, DUODENOSCOPY (EGD);  Surgeon: Digna Rhodes MD;  Location: [facility]     ESOPHAGOSCOPY, GASTROSCOPY, DUODENOSCOPY (EGD), COMBINED N/A 2/7/2017    Procedure: COMBINED ENDOSCOPIC ULTRASOUND, ESOPHAGOSCOPY, GASTROSCOPY, DUODENOSCOPY (EGD), FINE NEEDLE ASPIRATE/BIOPSY;  Surgeon: Too Thakur MD;  Location:  OR     HEAD & NECK SURGERY      reconstructive facial surgery following accident in 1989     IR FOLLOW UP VISIT INPATIENT   2019     IR GASTRO JEJUNOSTOMY TUBE CHANGE  2018     IR GASTRO JEJUNOSTOMY TUBE CHANGE  2019     IR GASTRO JEJUNOSTOMY TUBE CHANGE  3/8/2019     IR GASTRO JEJUNOSTOMY TUBE CHANGE  2019     IR PICC EXCHANGE LEFT  8/15/2019     LAPAROSCOPIC APPENDECTOMY  2013    Procedure: LAPAROSCOPIC APPENDECTOMY;  LAPAROSCOPIC APPENDECTOMY;  Surgeon: Manish Pierce MD;  Location:  OR     LAPAROSCOPIC ASSISTED INSERTION TUBE GASTROTOMY N/A 2016    Procedure: LAPAROSCOPIC ASSISTED INSERTION TUBE GASTROSTOMY;  Surgeon: Manish Pierce MD;  Location:  OR     ORTHOPEDIC SURGERY      right hand repair     TRACHEOSTOMY N/A 9/3/2016    Procedure: TRACHEOSTOMY;  Surgeon: João Ortiz MD;  Location: [facility] OR     TRACHEOSTOMY N/A 2016    Procedure: TRACHEOSTOMY;  Surgeon: João Ortiz MD;  Location:  OR     VASCULAR SURGERY         Prior to Admission Medications   Prior to Admission Medications   Prescriptions Last Dose Informant Patient Reported? Taking?   Bioflavonoid Products (VITAMIN C PLUS) 1000 MG TABS  Mother Yes No   Si tablet by Oral or FT or NG tube route   Brivaracetam (BRIVIACT) 10 MG/ML solution  Mother Yes No   Si mg by Oral or Feeding Tube route 2 times daily    Guar Gum (FIBER MODULAR, NUTRISOURCE FIBER,) packet  Mother No No   Si packet by Per G Tube route daily   Skin Protectants, Misc. (BALMEX SKIN PROTECTANT) OINT  Mother Yes No   Sig: Externally apply topically 2 times daily as needed (irritation) Applay to reddened memo areas twice daily as needed   acetylcysteine (MUCOMYST) 20 % neb solution  Mother No No   Sig: Take 2 mLs by nebulization 4 times daily   Patient taking differently: Take 2 mLs by nebulization every 6 hours with Albuterol   albuterol (PROVENTIL) (5 MG/ML) 0.5% neb solution  Mother Yes No   Sig: Take 2.5 mg by nebulization every 4 hours (while awake) 0700 1100 1500 1900 with mucomyst    aspirin (ASA) 81 MG chewable tablet   Mother Yes No   Si mg by Oral or Feeding Tube route daily At 0900   bacitracin ointment  Mother Yes No   Sig: Apply topically daily as needed for wound care To PEG site.    calcium carbonate 1250 (500 CA) MG/5ML SUSP suspension  Mother No No   Si mLs (1,250 mg) by Per J Tube route 3 times daily (with meals)   carBAMazepine (TEGRETOL) 100 MG/5ML suspension  Mother Yes No   Si mg by Oral or Feeding Tube route every 6 hours At 06:00, 12:00, 18:00 and 24:00 for seizures    ferrous sulfate 220 (44 Fe) MG/5ML ELIX  Mother Yes No   Si mg by Per Feeding Tube route daily   hydrocortisone (CORTEF) 5 MG tablet  Mother Yes No   Sig: 10 mg by Oral or FT or NG tube route daily (with dinner) At 1500   hydrocortisone (CORTEF) 5 MG tablet  Mother Yes No   Si mg by Oral or FT or NG tube route every morning    hydrocortisone 1 % CREA cream  Mother Yes No   Sig: Place rectally 2 times daily as needed for other Apply to reddened memo areas as needed   ipratropium - albuterol 0.5 mg/2.5 mg/3 mL (DUONEB) 0.5-2.5 (3) MG/3ML neb solution  Mother Yes No   Sig: Take 1 vial by nebulization every 4 hours as needed (bronchospasms or cough)   levofloxacin (LEVAQUIN) 500 MG tablet   No No   Si tablet (500 mg) by Per Feeding Tube route daily for 5 days   levothyroxine (SYNTHROID/LEVOTHROID) 150 MCG tablet   Yes No   Sig: Take 150 mcg by mouth every morning   melatonin (MELATONIN) 1 MG/ML LIQD liquid  Mother Yes No   Si mg by Per NG tube route At Bedtime    metoclopramide (REGLAN) 5 MG/5ML solution  Mother Yes No   Si mg by Per Feeding Tube route 2 times daily   miconazole (MICATIN) 2 % AERP powder  Mother Yes No   Sig: Apply topically 2 times daily as needed    mupirocin (BACTROBAN) 2 % external ointment  Mother Yes No   Sig: Apply topically 2 times daily as needed    order for DME  Mother No No   Sig: Equipment being ordered: Nebulizer   pantoprazole (PROTONIX) 2 mg/mL SUSP suspension  Mother No No   Si  mLs (40 mg) by Per J Tube route daily   potassium & sodium phosphates (NEUTRA-PHOS) 280-160-250 MG Packet  Mother Yes No   Sig: Take 1 packet by mouth 3 times daily 0900, 1500, 2100.    testosterone cypionate (DEPOTESTOTERONE CYPIONATE) 200 MG/ML injection  Mother Yes No   Sig: Inject 76 mg into the muscle See Admin Instructions Every 2 weeks on Fridays   76 mg or 0.38 mL   vitamin D3 (CHOLECALCIFEROL) 2000 units (50 mcg) tablet  Mother Yes No   Sig: Take 2,000 Units by mouth daily Crush and feed via j-tube @@ 0900      Facility-Administered Medications: None     Allergies   Allergies   Allergen Reactions     Dilantin [Phenytoin Sodium]      Valproic Acid      Toxicity c bone marrow suspension, elevated ammonia levels        Social History   I have reviewed this patient's social history and updated it with pertinent information if needed. Keyon GRAHAM Syedrosa  reports that he quit smoking about 30 years ago. He has never used smokeless tobacco. He reports that he does not drink alcohol or use drugs.    Family History   I have reviewed this patient's family history and updated it with pertinent information if needed.   Family History   Problem Relation Age of Onset     Cancer Father        Review of Systems   The 10 point Review of Systems is negative other than noted in the HPI    Physical Exam   Temp: 100.5  F (38.1  C) Temp src: Temporal BP: 100/64 Pulse: 103 Heart Rate: 114 Resp: 26 SpO2: 94 % O2 Device: None (Room air)    Vital Signs with Ranges  Temp:  [100.5  F (38.1  C)-105.3  F (40.7  C)] 100.5  F (38.1  C)  Pulse:  [101-109] 103  Heart Rate:  [114] 114  Resp:  [26] 26  BP: (100-120)/(52-77) 100/64  SpO2:  [94 %-99 %] 94 %  155 lbs 0 oz    Constitutional: Awake, alert, mildly cooperative, no apparent distress.  Eyes: Conjunctiva and pupils examined and normal.  HEENT: Moist mucous membranes, normal dentition.  Respiratory: Clear to auscultation bilaterally, no crackles or wheezing. No rhonchi at lateral  bases  Cardiovascular: Tachycardic, regular rhythm, normal S1 and S2, and no murmur noted.  GI: Soft, non-distended, non-tender, normal bowel sounds. Feeding tube in place without erythema.  Lymph/Hematologic: No anterior cervical or supraclavicular adenopathy.  Skin: No rashes, no cyanosis, no edema.  Musculoskeletal: No joint swelling, erythema or tenderness.  Neurologic: Moves LUE spontaneously  Psychiatric: Alert, unable to assess orientation. Looks to be at his baseline mental status.    Data   Data reviewed today:  I personally reviewed CXR shows increasing right sided opacity.  Recent Labs   Lab 09/16/19  1713 09/15/19  0818 09/15/19  0613 09/14/19  0636 09/13/19 2009   WBC 10.1 8.9  --  16.2* 13.0*   HGB 12.8*  --   --  10.9* 12.6*   MCV 86  --   --  84 85     --  116* 112* 137*   *  --  136 129* 130*   POTASSIUM 4.2  --   --  4.2 4.5   CHLORIDE 93*  --   --  98 95   CO2 29  --   --  27 30   BUN 14  --   --  12 15   CR 0.85  --  0.69 0.75 0.64*   ANIONGAP 6  --   --  4 5   STEVE 8.7  --   --  7.6* 8.8   *  --   --  84 76   ALBUMIN 3.1*  --   --   --  3.4   PROTTOTAL 8.3  --   --   --  8.4   BILITOTAL 0.3  --   --   --  0.2   ALKPHOS 91  --   --   --  113   ALT 28  --   --   --  30   AST 25  --   --   --  19       Recent Results (from the past 24 hour(s))   XR Chest Port 1 View    Narrative    CHEST ONE VIEW PORTABLE   9/16/2019 6:58 PM     HISTORY: Fever, history aspiration pneumonia    COMPARISON: 9/13/2018 chest x-ray      Impression    IMPRESSION: Increasing patchy opacification right lung consistent with  worsening pneumonia. Left lung grossly clear. Heart and pulmonary  vessels within normal limits.    LAWRENCE CATES MD

## 2019-09-17 NOTE — CONSULTS
CLINICAL NUTRITION SERVICES  -  ASSESSMENT NOTE    Recommendations Ordered by Registered Dietitian (RD):   Resume EN per home regimen (using formulary equivalent)   - GJT (feed J Port)  - Isosource 1.5 (equivalent to home Jevity 1.5) @ 100 ml/hr x 12 hours.   - Provides 1800 kcals, 82 gm pro, 18 gm fiber, 912 mL H20, 211 gm CHO  - Add Nutrisource Fiber 1 pkt daily for additional 15 kcal, 3 g fiber  - Add Prosource 1 pkt BID for additional 80 kcal, 22 g protein  Total Provisions: 1900 kcals (27 kcal/kg and 90% energy needs), 104 gm pro (1.5 gm/kg), 21 g fiber.   - Fluid flush 60 ml q 4 hrs while IVF on. Once IVF off increase to 150 q 4 hrs.    Malnutrition:   % Weight Loss:  Weight loss does not meet criteria for malnutrition   % Intake:  Decreased intake does not meet criteria for malnutrition   Subcutaneous Fat Loss:  Unable to assess  Muscle Loss:  Unable to assess  Fluid Retention:  None noted    Malnutrition Diagnosis: Unable to determine due to lack of NFPE     REASON FOR ASSESSMENT  Keyon Farias is a 59 year old male seen by Registered Dietitian for Provider Order - Registered Dietitian to Assess and Order TF per Medical Nutrition Therapy Protocol    NUTRITION HISTORY  - Information obtained from EMR.   - PMH: TBI, panhypopituitarism, spastic hemiplegia, seizures, dysphagia, upper extremity DVT, necrotizing pancreatitis, sepsis, UTI.   - Presented to ED with fever.   - Recent admission to [facility] in August with septic shock, UTI. Also recently on OBS here at [facility] discharged to home on 9/16.  - Pt is well-known to this service given his baseline enteral nutrition, since August 2016.   - Last GJT exchange was 8/7/19 due to clogging.  - He has had some recent diarrhea, likely related to levaquin use. Per EMR his mother said this was baseline and related to TF.   - Also with episode of emesis + ?aspiration PTA.     Information obtained from past RD notes:   - Tube type: GJT  - Enteral Regimen: Jevity 1.5 (5 to 5.5  "cans daily) x 12 hours during the day   - Provides: 0687-4551 kcal, 77-83g protein, ~260g CHO, ~27g fiber and ~900mL free water.   - Fluid flush: H2O flushes 120 mL QID.  \"- Pt lives with his mother, she likes to keep the TF rate at no more than 100 ml/hr. He is fed during the day rather than at night so that he can be up in the chair to prevent aspiration\"      CURRENT NUTRITION ORDERS  Diet Order:     NPO     Current Intake/Tolerance:  N/A    NUTRITION FOCUSED PHYSICAL ASSESSMENT FOR DIAGNOSING MALNUTRITION)  Completed:  No Patient not available         Observed:    Unable to complete    Obtained from Chart/Interdisciplinary Team:  WOCN consult pending  Last BM PTA    ANTHROPOMETRICS  Height: 6' 0\"  Weight: 155 lbs 0 oz (70.3 kg)   Body mass index is 21.02 kg/m .  Weight Status:  Normal BMI  IBW: 80.9 kg  % IBW: 87%  Weight History: Weight fluctuates 155-165#. Slight trend down.   Wt Readings from Last 10 Encounters:   09/16/19 70.3 kg (155 lb)   09/13/19 72 kg (158 lb 11.2 oz)   08/15/19 75.4 kg (166 lb 3.6 oz)   08/07/19 72.6 kg (160 lb)   06/19/19 72.4 kg (159 lb 11.2 oz)   05/30/19 74.5 kg (164 lb 3.9 oz)   05/08/19 74.5 kg (164 lb 3.2 oz)   04/08/19 71.8 kg (158 lb 6.4 oz)   04/02/19 72 kg (158 lb 11.7 oz)   03/18/19 70.9 kg (156 lb 4.9 oz)       LABS  Labs reviewed  Today's Labs pending  Recent Labs   Lab 09/16/19  1713 09/14/19  0636 09/13/19  2009   * 84 76       MEDICATIONS  Medications reviewed  Ferrous sulfate  (60 Fe) daily   Reglan 5 mg daily  NeutraPhos TID  NaCl IVF @ 100 mL/hr     ASSESSED NUTRITION NEEDS PER APPROVED PRACTICE GUIDELINES:  Dosing Weight 70.3 kg   Estimated Energy Needs: 6504-5023 kcals (30-35 Kcal/Kg)  Justification: repletion, spastic hemiplegia, ?wound (pending staging)  Estimated Protein Needs:  grams protein (1.2-1.8 g pro/Kg)  Justification: preservation of lean body mass  Estimated Fluid Needs: 1 mL/kcal  Justification: maintenance    MALNUTRITION:  % Weight " Loss:  Weight loss does not meet criteria for malnutrition   % Intake:  Decreased intake does not meet criteria for malnutrition   Subcutaneous Fat Loss:  Unable to assess  Muscle Loss:  Unable to assess  Fluid Retention:  None noted    Malnutrition Diagnosis: Unable to determine due to lack of NFPE    NUTRITION DIAGNOSIS:  Inadequate protein-energy intake related to multiple recent EN holds w/ admissions/discharges from hospital as evidenced by EN not yet started.       NUTRITION INTERVENTIONS  Recommendations / Nutrition Prescription  Resume EN per home regimen (using formulary equivalent)   - GJT (feed J Port)  - Isosource 1.5 (equivalent to home Jevity 1.5) @ 100 ml/hr x 12 hours.   - Provides 1800 kcals, 82 gm pro, 18 gm fiber, 912 mL H20, 211 gm CHO  - Add Nutrisource Fiber 1 pkt daily for additional 15 kcal, 3 g fiber  - Add Prosource 1 pkt BID for additional 80 kcal, 22 g protein  Total Provisions: 1900 kcals (27 kcal/kg and 90% energy needs), 104 gm pro (1.5 gm/kg), 21 g fiber.   - Fluid flush 60 ml q 4 hrs while IVF on. Once IVF off increase to 150 q 4 hrs.       Implementation  Nutrition education: No education needs assessed at this time  EN Composition, EN Schedule and Feeding Tube Flush: see above      Nutrition Goals  EN at goal to meet % estimated nutrient needs.       MONITORING AND EVALUATION:  Progress towards goals will be monitored and evaluated per protocol and Practice Guidelines    Marisel Fernández RD, LD  [facility], [address] [facility], [place]   Pager: [phone]  Weekend Pager: [phone]

## 2019-09-17 NOTE — PROVIDER NOTIFICATION
MD Notification    Notified Person: MD     Notified Person Name:Dr Guevara    Notification Date/Time: 9/16/2019     Notification Interaction: Spoke with Dr guevara in person    Purpose of Notification: Pt has temp of 101.9, B/P  75/38 and lactic of 2.5    Orders Received: Bolus NS 0.9% of 500 mL, then, to maintain IVF at 100 mL/hr. Recheck lactic. Given Tylenol and ice pack for temp management.     Comments:

## 2019-09-17 NOTE — CONSULTS
[facility]    Infectious Disease Consultation     Date of Admission:  9/16/2019  Date of Consult (When I saw the patient): 09/17/19    Assessment & Plan   Keyon Farias is a 59 year old male who was admitted on 9/16/2019.     Impression:  1. 59 y.o male with TBI.   2. Panhypopituitarism, spastic hemiplegia, seizures, chronic dysphagia, upper extremity DVT.   3. History of Necrotizing pancreatitis.   4. Presented with fever, just admitted on 09/13/2019 for a SIRS and a fever without identified source. The patient was discharged 09/16/2019 with a prescription for Levaquin.   5. He is unable to give any history.   6. He was started on IV zosyn.   7. UA is negative, BC pending. X- ray with possible worsening of pneumonia.     Recommendations:   Multiple admission over last many months, at risk for HCAI.   Get a CT chest abdomen pelvis. Patient unable to give any history, difficult to get to the source without clues.   Follow-up on blood cultures.       Senait Orona MD    Reason for Consult   Reason for consult: I was asked by Dr. Champion to evaluate this patient for fever .    Primary Care Physician   Carlos Gomez    Chief Complaint   High fever     History is obtained from the patient and medical records    History of Present Illness   Keyon Farias is a 59 year old male with traumatic brain injury with subsequent panhypopituitarism and spastic hemiplegia, seizures, chronic dysphagia status post PEG tube placement, hx upper extremity DVT, prior necrotizing pancreatitis with pseudocyst and multiple episodes of sepsis due to recurrent aspiration pneumonia  and UTI, recent admission to [facility] for septic shock secondary to UTI who was admitted on 9/13/2019 after presenting with fever. The patient was recently admitted to [facility] from 8/12/19-8/16/19 for septic shock related to UTI. He was discharged on a 7 day course of IV Levaquin which was completed. Per his mother's report he was doing fine  until the day of admission when he seemed less responsive and was found to have a fever of 104. He was brought to the ED via EMS and had an episode of emesis en route. He was evaluated in the ED without any obvious source of infection but again had high fever of 104    Past Medical History   I have reviewed this patient's medical history and updated it with pertinent information if needed.   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  Past Surgical History:   Procedure Laterality Date     ENDOSCOPIC ULTRASOUND UPPER GASTROINTESTINAL TRACT (GI) N/A 1/30/2017    Procedure: ENDOSCOPIC ULTRASOUND, ESOPHAGOSCOPY / UPPER GASTROINTESTINAL TRACT (GI);  Surgeon: Jus Montana MD;  Location: [facility] OR     ENDOSCOPIC ULTRASOUND, ESOPHAGOSCOPY, GASTROSCOPY, DUODENOSCOPY (EGD), NECROSECTOMY N/A 2/7/2017    Procedure: ENDOSCOPIC ULTRASOUND, ESOPHAGOSCOPY, GASTROSCOPY, DUODENOSCOPY (EGD), NECROSECTOMY;  Surgeon: Jack Marcus MD;  Location: [facility] OR     ESOPHAGOSCOPY, GASTROSCOPY, DUODENOSCOPY (EGD), COMBINED  3/13/2014     Procedure: COMBINED ESOPHAGOSCOPY, GASTROSCOPY, DUODENOSCOPY (EGD), BIOPSY SINGLE OR MULTIPLE;  gastroscopy;  Surgeon: Digna Rhodes MD;  Location:  GI     ESOPHAGOSCOPY, GASTROSCOPY, DUODENOSCOPY (EGD), COMBINED N/A 2016    Procedure: COMBINED ESOPHAGOSCOPY, GASTROSCOPY, DUODENOSCOPY (EGD);  Surgeon: Digna Rhodes MD;  Location:  GI     ESOPHAGOSCOPY, GASTROSCOPY, DUODENOSCOPY (EGD), COMBINED N/A 2017    Procedure: COMBINED ENDOSCOPIC ULTRASOUND, ESOPHAGOSCOPY, GASTROSCOPY, DUODENOSCOPY (EGD), FINE NEEDLE ASPIRATE/BIOPSY;  Surgeon: Too Thakur MD;  Location: [facility] OR     HEAD & NECK SURGERY      reconstructive facial surgery following accident in      IR FOLLOW UP VISIT INPATIENT  2019     IR GASTRO JEJUNOSTOMY TUBE CHANGE  2018     IR GASTRO JEJUNOSTOMY TUBE CHANGE  2019     IR GASTRO JEJUNOSTOMY TUBE CHANGE  3/8/2019     IR GASTRO JEJUNOSTOMY TUBE CHANGE  2019     IR PICC EXCHANGE LEFT  8/15/2019     LAPAROSCOPIC APPENDECTOMY  2013    Procedure: LAPAROSCOPIC APPENDECTOMY;  LAPAROSCOPIC APPENDECTOMY;  Surgeon: Manish Pierce MD;  Location:  OR     LAPAROSCOPIC ASSISTED INSERTION TUBE GASTROTOMY N/A 2016    Procedure: LAPAROSCOPIC ASSISTED INSERTION TUBE GASTROSTOMY;  Surgeon: Manish Pierce MD;  Location:  OR     ORTHOPEDIC SURGERY      right hand repair     TRACHEOSTOMY N/A 9/3/2016    Procedure: TRACHEOSTOMY;  Surgeon: João Ortiz MD;  Location:  OR     TRACHEOSTOMY N/A 2016    Procedure: TRACHEOSTOMY;  Surgeon: João Ortiz MD;  Location:  OR     VASCULAR SURGERY         Prior to Admission Medications   Prior to Admission Medications   Prescriptions Last Dose Informant Patient Reported? Taking?   Bioflavonoid Products (VITAMIN C PLUS) 1000 MG TABS 2019 at Unknown time Mother Yes Yes   Si tablet by Oral or FT or NG tube route   Brivaracetam (BRIVIACT) 10 MG/ML solution 2019 at  0900 Mother Yes Yes   Si mg by Oral or Feeding Tube route 2 times daily 0900, 2100   Guar Gum (FIBER MODULAR, NUTRISOURCE FIBER,) packet 2019 at 0900 Mother No Yes   Si packet by Per G Tube route daily   Skin Protectants, Misc. (BALMEX SKIN PROTECTANT) OINT  Mother Yes No   Sig: Externally apply topically 2 times daily as needed (irritation) Applay to reddened memo areas twice daily as needed   acetylcysteine (MUCOMYST) 20 % neb solution 2019 at 1500 Mother No Yes   Sig: Take 2 mLs by nebulization 4 times daily   Patient taking differently: Take 2 mLs by nebulization every 6 hours with Albuterol   0700,1100,1500,1900   albuterol (PROVENTIL) (5 MG/ML) 0.5% neb solution 2019 at 1500 Mother Yes Yes   Sig: Take 2.5 mg by nebulization every 4 hours (while awake) 0700 1100 1500 1900 with mucomyst    aspirin (ASA) 81 MG chewable tablet 2019 at am Mother Yes Yes   Si mg by Oral or Feeding Tube route daily At 0900   bacitracin ointment as needed Mother Yes Yes   Sig: Apply topically daily as needed for wound care To PEG site.    calcium carbonate 1250 (500 CA) MG/5ML SUSP suspension 2019 at 1500 Mother No Yes   Si mLs (1,250 mg) by Per J Tube route 3 times daily (with meals)   carBAMazepine (TEGRETOL) 100 MG/5ML suspension 2019 at 1800 Mother Yes Yes   Si mg by Oral or Feeding Tube route every 6 hours At 06:00, 12:00, 18:00 and 24:00 for seizures    ferrous sulfate 220 (44 Fe) MG/5ML ELIX 2019 at 0900 Mother Yes Yes   Si mg by Per Feeding Tube route daily   hydrocortisone (CORTEF) 5 MG tablet 9/15/2019 at 1500 Mother Yes Yes   Sig: 10 mg by Oral or FT or NG tube route daily (with dinner) At 1500   hydrocortisone (CORTEF) 5 MG tablet 2019 at 0900 Mother Yes Yes   Si mg by Oral or FT or NG tube route every morning    hydrocortisone 1 % CREA cream as needed Mother Yes Yes   Sig: Place rectally 2 times daily as needed for other Apply to reddened memo  areas as needed   ipratropium - albuterol 0.5 mg/2.5 mg/3 mL (DUONEB) 0.5-2.5 (3) MG/3ML neb solution as needed Mother Yes Yes   Sig: Take 1 vial by nebulization every 4 hours as needed (bronchospasms or cough)   levofloxacin (LEVAQUIN) 500 MG tablet 2019 at 0000 Mother No Yes   Si tablet (500 mg) by Per Feeding Tube route daily for 5 days   levothyroxine (SYNTHROID/LEVOTHROID) 150 MCG tablet 2019 at Unknown time Mother Yes Yes   Sig: Take 150 mcg by mouth every morning   melatonin (MELATONIN) 1 MG/ML LIQD liquid 9/15/2019 at Unknown time Mother Yes Yes   Si mg by Per NG tube route At Bedtime    metoclopramide (REGLAN) 5 MG/5ML solution 2019 at am Mother Yes Yes   Si mg by Per Feeding Tube route 2 times daily   miconazole (MICATIN) 2 % AERP powder as needed Mother Yes Yes   Sig: Apply topically 2 times daily as needed    mupirocin (BACTROBAN) 2 % external ointment as needed Mother Yes Yes   Sig: Apply topically 2 times daily as needed    order for DME  Mother No No   Sig: Equipment being ordered: Nebulizer   pantoprazole (PROTONIX) 2 mg/mL SUSP suspension 2019 at 0900 Mother No Yes   Si mLs (40 mg) by Per J Tube route daily   potassium & sodium phosphates (NEUTRA-PHOS) 280-160-250 MG Packet 2019 at 1500 Mother Yes Yes   Sig: Take 1 packet by mouth 3 times daily 0900, 1500, 2100.    testosterone cypionate (DEPOTESTOTERONE CYPIONATE) 200 MG/ML injection 2019 Mother Yes No   Sig: Inject 76 mg into the muscle See Admin Instructions Every 2 weeks on    76 mg or 0.38 mL   vitamin D3 (CHOLECALCIFEROL) 2000 units (50 mcg) tablet 2019 at 0900 Mother Yes Yes   Sig: Take 2,000 Units by mouth daily Crush and feed via j-tube @@ 0900      Facility-Administered Medications: None     Allergies   Allergies   Allergen Reactions     Dilantin [Phenytoin Sodium]      Valproic Acid      Toxicity c bone marrow suspension, elevated ammonia levels        Immunization History    Immunization History   Administered Date(s) Administered     Flu, Unspecified 11/29/2006, 10/29/2009, 09/23/2011, 11/12/2013     Influenza (IIV3) PF 10/28/1997, 11/29/2006, 10/29/2009, 01/23/2013     Influenza Quad, Recombinant, p-free (RIV4) 09/20/2018     Influenza Vaccine IM > 6 months Valent IIV4 12/03/2015, 12/09/2016, 10/05/2017     Pneumo Conj 13-V (2010&after) 02/03/2015     Pneumococcal 23 valent 06/07/2007, 07/21/2010, 05/11/2017     TDAP Vaccine (Adacel) 01/08/2009       Social History   I have reviewed this patient's social history and updated it with pertinent information if needed. Keyon Farias  reports that he quit smoking about 30 years ago. He has never used smokeless tobacco. He reports that he does not drink alcohol or use drugs.    Family History   I have reviewed this patient's family history and updated it with pertinent information if needed.   Family History   Problem Relation Age of Onset     Cancer Father        Review of Systems   Could not be obtained as patient is non verbal     Physical Exam   Temp: 97.5  F (36.4  C) Temp src: Oral BP: 90/48 Pulse: 74 Heart Rate: 88 Resp: 18 SpO2: 91 % O2 Device: None (Room air)    Vital Signs with Ranges  Temp:  [97.5  F (36.4  C)-105.3  F (40.7  C)] 97.5  F (36.4  C)  Pulse:  [] 74  Heart Rate:  [] 88  Resp:  [18-30] 18  BP: ()/(38-77) 90/48  SpO2:  [91 %-99 %] 91 %  155 lbs 0 oz  Body mass index is 21.02 kg/m .    GENERAL APPEARANCE:  Awake   EYES: Eyes grossly normal to inspection, PERRL and conjunctivae and sclerae normal  HENT: ear canals and TM's normal and nose and mouth without ulcers or lesions  NECK: no adenopathy, no asymmetry, masses, or scars and thyroid normal to palpation  RESP: lungs clear to auscultation - no rales, rhonchi or wheezes  CV: regular rates and rhythm, normal S1 S2, no S3 or S4 and no murmur, click or rub  LYMPHATICS: normal ant/post cervical and supraclavicular nodes  ABDOMEN: soft, nontender, without  hepatosplenomegaly or masses and bowel sounds normal  MS: contracted and spastic   SKIN: no suspicious lesions or rashes      Data   Lab Results   Component Value Date    WBC 7.7 09/17/2019    HGB 9.7 (L) 09/17/2019    HCT 29.4 (L) 09/17/2019     (L) 09/17/2019     (L) 09/17/2019    POTASSIUM 3.8 09/17/2019    CHLORIDE 102 09/17/2019    CO2 22 09/17/2019    BUN 12 09/17/2019    CR 0.85 09/17/2019    GLC 87 09/17/2019    DD 0.2 09/25/2006    NTBNPI 400 04/05/2019    TROPI 0.017 01/22/2017    AST 25 09/16/2019    ALT 28 09/16/2019    ALKPHOS 91 09/16/2019    BILITOTAL 0.3 09/16/2019    KAMLA 20 12/01/2016    INR 1.27 (H) 02/07/2017     Recent Labs   Lab 09/16/19  1714 09/13/19 2124 09/13/19 2009   CULT No growth after 10 hours  No growth after 10 hours No growth No growth after 3 days     Recent Labs   Lab Test 09/16/19  1714 09/13/19  2124 09/13/19  2009 08/12/19  2300 08/12/19  1521 08/12/19  1405 08/12/19  1314 06/17/19  1931 06/17/19  1855   CULT No growth after 10 hours  No growth after 10 hours No growth No growth after 3 days Methicillin resistant Staphylococcus aureus (MRSA) isolated* >10 Squamous epithelial cells/low power field indicates oral contamination. Please   recollect.  *  Canceled, Test credited  Notification of test cancellation was given to  Katie Jones RN, @1935 08/12/19..   No growth  >100,000 colonies/mL  Lactose fermenting gram negative rods  *  >100,000 colonies/mL  Non lactose fermenting gram negative rods  *  >100,000 colonies/mL  Gram positive cocci  Strain 1  *  10,000 to 50,000 colonies/mL  Gram positive cocci  Strain 2  *  Susceptibility testing not routinely done  Susceptibility upon request   No growth No growth >100,000 colonies/mL  Enterococcus faecalis  *       Amount of time performed on this consult: 45 minutes. This includes face to face assessment and care coordination with the primary team.

## 2019-09-17 NOTE — ED NOTES
"[facility]  ED Nurse Handoff Report    ED Chief complaint: Fever      ED Diagnosis:   Final diagnoses:   None       Code Status:  Admitting MD to assess.      Allergies:   Allergies   Allergen Reactions     Dilantin [Phenytoin Sodium]      Valproic Acid      Toxicity c bone marrow suspension, elevated ammonia levels        Activity level - Baseline/Home:  Total Care  Activity Level - Current:   Total Care    Patient's Preferred language: Nonverbal/English   Needed?: No    Isolation: Yes  Infection: Not Applicable  C-Diff Pending  Bariatric?: No    Vital Signs:   Vitals:    09/16/19 1745 09/16/19 1754 09/16/19 1815 09/16/19 1830   BP: 102/77  111/53 113/55   Pulse: 101  105 102   Resp:       Temp:  100.5  F (38.1  C)     TempSrc:  Temporal     SpO2: 97%  95% 95%   Weight:  70.3 kg (155 lb)     Height:  1.829 m (6')         Cardiac Rhythm: ,        Pain level:      Is this patient confused?: Unable to assess.   Does this patient have a guardian?  Yes         If yes, is there guardianship documents in the Epic \"Code/ACP\" activity?  Yes         Guardian Notified?  Yes , present in ED  [place] - Suicide Severity Rating Scale Completed?  No, secondary to nonverbal  If yes, what color did the patient score?  N/A    Patient Report: Initial Complaint: Patient arrived via EMS, \"nurse\" at his residence said that he has a fever of 103. No other information was provided to EMS. Patient was discharged yesterday for similar complaints  Focused Assessment: Patient is calm and cooperative, unable to determine if he is oriented. Patient is febrile and tachycardic, skin is warm and dry, respirations are even and non-labored on room air. Mother is at the bedside.       Tests Performed: Labs, CXR, blood cultures pending.   Abnormal Results:   Labs Ordered and Resulted from Time of ED Arrival Up to the Time of Departure from the ED   CBC WITH PLATELETS DIFFERENTIAL - Abnormal; Notable for the following " components:       Result Value    RBC Count 4.37 (*)     Hemoglobin 12.8 (*)     Hematocrit 37.6 (*)     All other components within normal limits   COMPREHENSIVE METABOLIC PANEL - Abnormal; Notable for the following components:    Sodium 128 (*)     Chloride 93 (*)     Glucose 114 (*)     Albumin 3.1 (*)     All other components within normal limits   ROUTINE UA WITH MICROSCOPIC - Abnormal; Notable for the following components:    pH Urine 8.5 (*)     Protein Albumin Urine 30 (*)     Urobilinogen mg/dL 4.0 (*)     RBC Urine 3 (*)     All other components within normal limits   ISTAT  GASES LACTATE BOBBI POCT - Abnormal; Notable for the following components:    PO2 Venous 14 (*)     Lactic Acid 2.5 (*)     All other components within normal limits   PROCALCITONIN   ISTAT CG4 GASES LACTATE BOBBI NURSING POCT   PERIPHERAL IV CATHETER   ISTAT CG4 GASES LACTATE BOBBI NURSING POCT   URINE CULTURE AEROBIC BACTERIAL   BLOOD CULTURE   BLOOD CULTURE   CLOSTRIDIUM DIFFICILE TOXIN B       Treatments provided: IV fluid, IV ABX, rectal Tylenol    Family Comments: Mother is at the bedside.     OBS brochure/video discussed/provided to patient/family: N/A              Name of person given brochure if not patient: NA              Relationship to patient: NA    ED Medications:   Medications   acetaminophen (TYLENOL) Suppository 650 mg (650 mg Rectal Given 9/16/19 1714)   0.9% sodium chloride BOLUS (1,000 mLs Intravenous New Bag 9/16/19 1752)   0.9% sodium chloride BOLUS (1,000 mLs Intravenous New Bag 9/16/19 1715)   piperacillin-tazobactam (ZOSYN) 4.5 g vial to attach to  mL bag (4.5 g Intravenous New Bag 9/16/19 1743)       Drips infusing?:  No    For the majority of the shift this patient was Green.   Interventions performed were NA.    Severe Sepsis OR Septic Shock Diagnosis Present: No    To be done/followed up on inpatient unit:  Monitor    ED NURSE PHONE NUMBER: [phone]

## 2019-09-17 NOTE — PHARMACY-ADMISSION MEDICATION HISTORY
Admission medication history interview status for the 9/16/2019  admission is complete. See EPIC admission navigator for prior to admission medications     Medication history source reliability:Good    Actions taken by pharmacist (provider contacted, etc): Reviewed medications with mother.     Additional medication history information not noted on PTA med list :None    Medication reconciliation/reorder completed by provider prior to medication history? No    Time spent in this activity: 20 minutes    Prior to Admission medications    Medication Sig Last Dose Taking? Auth Provider   acetylcysteine (MUCOMYST) 20 % neb solution Take 2 mLs by nebulization 4 times daily  Patient taking differently: Take 2 mLs by nebulization every 6 hours with Albuterol   0700,1100,1500,1900 9/16/2019 at 1500 Yes Starr Champion MD   albuterol (PROVENTIL) (5 MG/ML) 0.5% neb solution Take 2.5 mg by nebulization every 4 hours (while awake) 0700 1100 1500 1900 with mucomyst  9/16/2019 at 1500 Yes Unknown, Entered By History   aspirin (ASA) 81 MG chewable tablet 81 mg by Oral or Feeding Tube route daily At 0900 9/16/2019 at am Yes Unknown, Entered By History   bacitracin ointment Apply topically daily as needed for wound care To PEG site.  as needed Yes Unknown, Entered By History   Bioflavonoid Products (VITAMIN C PLUS) 1000 MG TABS 1 tablet by Oral or FT or NG tube route 9/16/2019 at Unknown time Yes Unknown, Entered By History   Brivaracetam (BRIVIACT) 10 MG/ML solution 100 mg by Oral or Feeding Tube route 2 times daily 0900, 2100 9/16/2019 at 0900 Yes Unknown, Entered By History   calcium carbonate 1250 (500 CA) MG/5ML SUSP suspension 5 mLs (1,250 mg) by Per J Tube route 3 times daily (with meals) 9/16/2019 at 1500 Yes Mariana Venegas MD   carBAMazepine (TEGRETOL) 100 MG/5ML suspension 150 mg by Oral or Feeding Tube route every 6 hours At 06:00, 12:00, 18:00 and 24:00 for seizures  9/16/2019 at 1800 Yes Unknown, Entered By  History   ferrous sulfate 220 (44 Fe) MG/5ML ELIX 220 mg by Per Feeding Tube route daily 9/16/2019 at 0900 Yes Unknown, Entered By History   Guar Gum (FIBER MODULAR, NUTRISOURCE FIBER,) packet 1 packet by Per G Tube route daily 9/16/2019 at 0900 Yes Starr Champion MD   hydrocortisone (CORTEF) 5 MG tablet 10 mg by Oral or FT or NG tube route daily (with dinner) At 1500 9/15/2019 at 1500 Yes Unknown, Entered By History   hydrocortisone (CORTEF) 5 MG tablet 20 mg by Oral or FT or NG tube route every morning  9/16/2019 at 0900 Yes Unknown, Entered By History   hydrocortisone 1 % CREA cream Place rectally 2 times daily as needed for other Apply to reddened memo areas as needed as needed Yes Unknown, Entered By History   ipratropium - albuterol 0.5 mg/2.5 mg/3 mL (DUONEB) 0.5-2.5 (3) MG/3ML neb solution Take 1 vial by nebulization every 4 hours as needed (bronchospasms or cough) as needed Yes Unknown, Entered By History   levofloxacin (LEVAQUIN) 500 MG tablet 1 tablet (500 mg) by Per Feeding Tube route daily for 5 days 9/16/2019 at 0000 Yes Bindu Barillas PA-C   levothyroxine (SYNTHROID/LEVOTHROID) 150 MCG tablet Take 150 mcg by mouth every morning 9/16/2019 at Unknown time Yes Unknown, Entered By History   melatonin (MELATONIN) 1 MG/ML LIQD liquid 6 mg by Per NG tube route At Bedtime  9/15/2019 at Unknown time Yes Unknown, Entered By History   metoclopramide (REGLAN) 5 MG/5ML solution 5 mg by Per Feeding Tube route 2 times daily 9/16/2019 at am Yes Unknown, Entered By History   miconazole (MICATIN) 2 % AERP powder Apply topically 2 times daily as needed  as needed Yes Unknown, Entered By History   mupirocin (BACTROBAN) 2 % external ointment Apply topically 2 times daily as needed  as needed Yes Reported, Patient   pantoprazole (PROTONIX) 2 mg/mL SUSP suspension 20 mLs (40 mg) by Per J Tube route daily 9/16/2019 at 0900 Yes Washington Connors MD   potassium & sodium phosphates (NEUTRA-PHOS) 280-160-250 MG Packet  Take 1 packet by mouth 3 times daily 0900, 1500, 2100.  9/16/2019 at 1500 Yes Unknown, Entered By History   vitamin D3 (CHOLECALCIFEROL) 2000 units (50 mcg) tablet Take 2,000 Units by mouth daily Crush and feed via j-tube @@ 0900 9/16/2019 at 0900 Yes Unknown, Entered By History   order for DME Equipment being ordered: Nebulizer   Starr Champion MD   Skin Protectants, Misc. (BALMEX SKIN PROTECTANT) OINT Externally apply topically 2 times daily as needed (irritation) Applay to reddened memo areas twice daily as needed   Unknown, Entered By History   testosterone cypionate (DEPOTESTOTERONE CYPIONATE) 200 MG/ML injection Inject 76 mg into the muscle See Admin Instructions Every 2 weeks on Fridays   76 mg or 0.38 mL 9/6/2019  Unknown, Entered By History

## 2019-09-17 NOTE — PLAN OF CARE
DATE & TIME: 9/16 from 2030 to 0730    Cognitive Concerns/ Orientation : HECTOR, Non-verbal    BEHAVIOR & AGGRESSION TOOL COLOR: Green, calm and cooperative  CIWA SCORE: N/A   ABNL VS/O2: VSS on RA except B/P 94/55  MOBILITY: Total cares. Turn and reposition Q 2 hours  PAIN MANAGMENT: Denied, appears comfortable  DIET: Tube feeding at baseline. Diet pending per dietician consult.   BOWEL/BLADDER: Incontinent to bladder and bowel  ABNL LAB/BG: Na 128, Hgb 12.8, Lactic 2.5 on admission, given bolus in ED, recheck 1.5  DRAIN/DEVICES: NS at 100 mL/hr and IV abx  TELEMETRY RHYTHM: NSR  SKIN: blanchable redness on left elbow, non-blanchable redness on the coccyx area. Meplex dressing applied. Groin is pink/reddened, Miconazole powder applied.   TESTS/PROCEDURES: N/A  D/C DAY/GOALS/PLACE: Discharge pending in progress  OTHER IMPORTANT INFO: Wound consult pending. Pt is tube feeding at baseline, Diet consult pending. Pt had temp of 101.9 on admission, MD updated. Given Tylenol, temp trended down to 97.7.

## 2019-09-18 LAB
ANION GAP SERPL CALCULATED.3IONS-SCNC: 7 MMOL/L (ref 3–14)
BUN SERPL-MCNC: 10 MG/DL (ref 7–30)
CALCIUM SERPL-MCNC: 8.1 MG/DL (ref 8.5–10.1)
CHLORIDE SERPL-SCNC: 102 MMOL/L (ref 94–109)
CO2 SERPL-SCNC: 24 MMOL/L (ref 20–32)
CREAT SERPL-MCNC: 0.89 MG/DL (ref 0.66–1.25)
ERYTHROCYTE [DISTWIDTH] IN BLOOD BY AUTOMATED COUNT: 14.3 % (ref 10–15)
GFR SERPL CREATININE-BSD FRML MDRD: >90 ML/MIN/{1.73_M2}
GLUCOSE SERPL-MCNC: 119 MG/DL (ref 70–99)
HCT VFR BLD AUTO: 30.7 % (ref 40–53)
HGB BLD-MCNC: 10.1 G/DL (ref 13.3–17.7)
MAGNESIUM SERPL-MCNC: 2 MG/DL (ref 1.6–2.3)
MCH RBC QN AUTO: 28.1 PG (ref 26.5–33)
MCHC RBC AUTO-ENTMCNC: 32.9 G/DL (ref 31.5–36.5)
MCV RBC AUTO: 86 FL (ref 78–100)
PHOSPHATE SERPL-MCNC: 2.2 MG/DL (ref 2.5–4.5)
PLATELET # BLD AUTO: 150 10E9/L (ref 150–450)
POTASSIUM SERPL-SCNC: 3.6 MMOL/L (ref 3.4–5.3)
PROCALCITONIN SERPL-MCNC: 0.63 NG/ML
RBC # BLD AUTO: 3.59 10E12/L (ref 4.4–5.9)
SODIUM SERPL-SCNC: 133 MMOL/L (ref 133–144)
WBC # BLD AUTO: 6.7 10E9/L (ref 4–11)

## 2019-09-18 PROCEDURE — 36415 COLL VENOUS BLD VENIPUNCTURE: CPT | Performed by: HOSPITALIST

## 2019-09-18 PROCEDURE — 84145 PROCALCITONIN (PCT): CPT | Performed by: HOSPITALIST

## 2019-09-18 PROCEDURE — 27210429 ZZH NUTRITION PRODUCT INTERMEDIATE LITER

## 2019-09-18 PROCEDURE — 84100 ASSAY OF PHOSPHORUS: CPT | Performed by: HOSPITALIST

## 2019-09-18 PROCEDURE — 85027 COMPLETE CBC AUTOMATED: CPT | Performed by: HOSPITALIST

## 2019-09-18 PROCEDURE — 12000000 ZZH R&B MED SURG/OB

## 2019-09-18 PROCEDURE — 94640 AIRWAY INHALATION TREATMENT: CPT | Mod: 76

## 2019-09-18 PROCEDURE — 99232 SBSQ HOSP IP/OBS MODERATE 35: CPT | Performed by: INTERNAL MEDICINE

## 2019-09-18 PROCEDURE — 87040 BLOOD CULTURE FOR BACTERIA: CPT | Performed by: INTERNAL MEDICINE

## 2019-09-18 PROCEDURE — 25800030 ZZH RX IP 258 OP 636: Performed by: HOSPITALIST

## 2019-09-18 PROCEDURE — 36415 COLL VENOUS BLD VENIPUNCTURE: CPT | Performed by: INTERNAL MEDICINE

## 2019-09-18 PROCEDURE — 25000132 ZZH RX MED GY IP 250 OP 250 PS 637: Mod: GY | Performed by: HOSPITALIST

## 2019-09-18 PROCEDURE — 40000275 ZZH STATISTIC RCP TIME EA 10 MIN

## 2019-09-18 PROCEDURE — 80048 BASIC METABOLIC PNL TOTAL CA: CPT | Performed by: HOSPITALIST

## 2019-09-18 PROCEDURE — 94640 AIRWAY INHALATION TREATMENT: CPT

## 2019-09-18 PROCEDURE — 25000128 H RX IP 250 OP 636: Performed by: HOSPITALIST

## 2019-09-18 PROCEDURE — 83735 ASSAY OF MAGNESIUM: CPT | Performed by: HOSPITALIST

## 2019-09-18 PROCEDURE — 25000125 ZZHC RX 250: Performed by: HOSPITALIST

## 2019-09-18 RX ORDER — POTASSIUM CL/LIDO/0.9 % NACL 10MEQ/0.1L
10 INTRAVENOUS SOLUTION, PIGGYBACK (ML) INTRAVENOUS
Status: DISCONTINUED | OUTPATIENT
Start: 2019-09-18 | End: 2019-09-20 | Stop reason: HOSPADM

## 2019-09-18 RX ORDER — POTASSIUM CHLORIDE 29.8 MG/ML
20 INJECTION INTRAVENOUS
Status: DISCONTINUED | OUTPATIENT
Start: 2019-09-18 | End: 2019-09-20 | Stop reason: HOSPADM

## 2019-09-18 RX ORDER — POTASSIUM CHLORIDE 7.45 MG/ML
10 INJECTION INTRAVENOUS
Status: DISCONTINUED | OUTPATIENT
Start: 2019-09-18 | End: 2019-09-20 | Stop reason: HOSPADM

## 2019-09-18 RX ORDER — POTASSIUM CHLORIDE 1500 MG/1
20-40 TABLET, EXTENDED RELEASE ORAL
Status: DISCONTINUED | OUTPATIENT
Start: 2019-09-18 | End: 2019-09-20 | Stop reason: HOSPADM

## 2019-09-18 RX ORDER — POTASSIUM CHLORIDE 1.5 G/1.58G
20-40 POWDER, FOR SOLUTION ORAL
Status: DISCONTINUED | OUTPATIENT
Start: 2019-09-18 | End: 2019-09-20 | Stop reason: HOSPADM

## 2019-09-18 RX ADMIN — HYDROCORTISONE 10 MG: 10 TABLET ORAL at 17:15

## 2019-09-18 RX ADMIN — PIPERACILLIN AND TAZOBACTAM 4.5 G: 4; .5 INJECTION, POWDER, FOR SOLUTION INTRAVENOUS at 06:39

## 2019-09-18 RX ADMIN — CARBAMAZEPINE 150 MG: 100 SUSPENSION ORAL at 13:21

## 2019-09-18 RX ADMIN — HYDROCORTISONE 20 MG: 20 TABLET ORAL at 08:36

## 2019-09-18 RX ADMIN — METOCLOPRAMIDE 5 MG: 5 SOLUTION ORAL at 08:35

## 2019-09-18 RX ADMIN — ACETYLCYSTEINE 4 ML: 200 SOLUTION ORAL; RESPIRATORY (INHALATION) at 08:56

## 2019-09-18 RX ADMIN — CARBAMAZEPINE 150 MG: 100 SUSPENSION ORAL at 06:39

## 2019-09-18 RX ADMIN — MICONAZOLE NITRATE: 20 POWDER TOPICAL at 08:52

## 2019-09-18 RX ADMIN — MINERAL SUPPLEMENT IRON 300 MG / 5 ML STRENGTH LIQUID 100 PER BOX UNFLAVORED 220 MG: at 08:36

## 2019-09-18 RX ADMIN — Medication 1 PACKET: at 11:54

## 2019-09-18 RX ADMIN — ASPIRIN 81 MG 81 MG: 81 TABLET ORAL at 08:35

## 2019-09-18 RX ADMIN — IPRATROPIUM BROMIDE AND ALBUTEROL SULFATE 3 ML: .5; 3 SOLUTION RESPIRATORY (INHALATION) at 16:33

## 2019-09-18 RX ADMIN — MICONAZOLE NITRATE: 20 POWDER TOPICAL at 21:52

## 2019-09-18 RX ADMIN — MELATONIN TAB 3 MG 6 MG: 3 TAB at 21:51

## 2019-09-18 RX ADMIN — POTASSIUM & SODIUM PHOSPHATES POWDER PACK 280-160-250 MG 1 PACKET: 280-160-250 PACK at 08:36

## 2019-09-18 RX ADMIN — METOCLOPRAMIDE 5 MG: 5 SOLUTION ORAL at 21:52

## 2019-09-18 RX ADMIN — Medication 1 PACKET: at 21:51

## 2019-09-18 RX ADMIN — MUPIROCIN: 20 OINTMENT TOPICAL at 21:52

## 2019-09-18 RX ADMIN — BRIVARACETAM 100 MG: 10 SOLUTION ORAL at 08:57

## 2019-09-18 RX ADMIN — SODIUM CHLORIDE, PRESERVATIVE FREE: 5 INJECTION INTRAVENOUS at 13:21

## 2019-09-18 RX ADMIN — MUPIROCIN: 20 OINTMENT TOPICAL at 17:15

## 2019-09-18 RX ADMIN — POTASSIUM & SODIUM PHOSPHATES POWDER PACK 280-160-250 MG 1 PACKET: 280-160-250 PACK at 17:14

## 2019-09-18 RX ADMIN — PIPERACILLIN AND TAZOBACTAM 4.5 G: 4; .5 INJECTION, POWDER, FOR SOLUTION INTRAVENOUS at 00:06

## 2019-09-18 RX ADMIN — CARBAMAZEPINE 150 MG: 100 SUSPENSION ORAL at 00:06

## 2019-09-18 RX ADMIN — ACETYLCYSTEINE 4 ML: 200 SOLUTION ORAL; RESPIRATORY (INHALATION) at 01:43

## 2019-09-18 RX ADMIN — PIPERACILLIN AND TAZOBACTAM 4.5 G: 4; .5 INJECTION, POWDER, FOR SOLUTION INTRAVENOUS at 11:54

## 2019-09-18 RX ADMIN — LEVOTHYROXINE SODIUM 150 MCG: 150 TABLET ORAL at 06:38

## 2019-09-18 RX ADMIN — MUPIROCIN: 20 OINTMENT TOPICAL at 08:52

## 2019-09-18 RX ADMIN — SODIUM CHLORIDE, PRESERVATIVE FREE: 5 INJECTION INTRAVENOUS at 03:13

## 2019-09-18 RX ADMIN — CARBAMAZEPINE 150 MG: 100 SUSPENSION ORAL at 17:24

## 2019-09-18 RX ADMIN — Medication 40 MG: at 08:35

## 2019-09-18 RX ADMIN — BRIVARACETAM 100 MG: 10 SOLUTION ORAL at 21:52

## 2019-09-18 RX ADMIN — POTASSIUM & SODIUM PHOSPHATES POWDER PACK 280-160-250 MG 1 PACKET: 280-160-250 PACK at 21:52

## 2019-09-18 RX ADMIN — ACETYLCYSTEINE 2 ML: 200 SOLUTION ORAL; RESPIRATORY (INHALATION) at 13:16

## 2019-09-18 RX ADMIN — IPRATROPIUM BROMIDE AND ALBUTEROL SULFATE 3 ML: .5; 3 SOLUTION RESPIRATORY (INHALATION) at 01:43

## 2019-09-18 RX ADMIN — PIPERACILLIN AND TAZOBACTAM 4.5 G: 4; .5 INJECTION, POWDER, FOR SOLUTION INTRAVENOUS at 17:24

## 2019-09-18 ASSESSMENT — ACTIVITIES OF DAILY LIVING (ADL)
ADLS_ACUITY_SCORE: 44
ADLS_ACUITY_SCORE: 42
ADLS_ACUITY_SCORE: 42

## 2019-09-18 ASSESSMENT — MIFFLIN-ST. JEOR: SCORE: 1562

## 2019-09-18 NOTE — PLAN OF CARE
Afebrile, awake most of the shift. Nonverbal but does become vocal when interacting with staff. Total cares, repositioned and turned q 2hrs.  Frequently incontinent of large amount of urine. TF started at 0700. Meds through g tube. Periarea and buttocks are red and excoriated-cares provided. PCDs on.

## 2019-09-18 NOTE — PLAN OF CARE
DATE & TIME: 9/17 from 1900 to 0730    Cognitive Concerns/ Orientation : Nonverbal , HECTOR orientation  BEHAVIOR & AGGRESSION TOOL COLOR: Green, calm   CIWA SCORE: N/A   ABNL VS/O2: VSS on RA  MOBILITY: Total cares, turn and reposition Q 2 hours  PAIN MANAGMENT: Appears comfortable  DIET: NPO, on Tube feeding, of at 1900  BOWEL/BLADDER: Incontinent to bladder, had loose BM x 1 during this shift  ABNL LAB/BG: Na 132, Hgb 9.7, platelet 121  DRAIN/DEVICES: IVF at 100 mL/hr  TELEMETRY RHYTHM: NSR  SKIN: Skin pale, bruised. Blanchable redness on the bottom, inés cares done per WOC recommendation.   TESTS/PROCEDURES: N/A  D/C DAY/GOALS/PLACE: Pending in progress 2-3 days once fevers resolved  OTHER IMPORTANT INFO: Lung sound diminished, infrequent non-productive cough. Receiving neb treatment and IV abx. Tube feeding from 0700 to 1900. Fluid flush 60 ml q 4 hrs while IVF on. Once IVF off increase to 150 q 4 hrs. culture on 9/16 at 1714 come back gram positive cocci in clusters, Dr Ash, On call MD notified.

## 2019-09-18 NOTE — PROVIDER NOTIFICATION
MD Notification    Notified Person: MD    Notified Person Name: Dr Ash    Notification Date/Time:09/18/2019 at 0655    Notification Interaction:    Purpose of Notification: culture on left arm on 9/16/19 at 1714 come back gram positive cocci in clusters    Orders Received: waiting call back, day nurse updated.     Comments:

## 2019-09-18 NOTE — PROGRESS NOTES
[facility]    Infectious Disease Progress Note    Date of Service (when I saw the patient): 09/18/2019     Assessment & Plan   Keyon Farias is a 59 year old male who was admitted on 9/16/2019.     Impression:  1. 59 y.o male with TBI.   2. Panhypopituitarism, spastic hemiplegia, seizures, chronic dysphagia, upper extremity DVT.   3. History of Necrotizing pancreatitis.   4. Presented with fever, just admitted on 09/13/2019 for a SIRS and a fever without identified source. The patient was discharged 09/16/2019 with a prescription for Levaquin.   5. He is unable to give any history.   6. He was started on IV zosyn.   7. UA is negative, BC pending. X- ray with possible worsening of pneumonia.      Recommendations:   Multiple admission over last many months, at risk for HCAI.   Continue on zosyn for aspiration pneumonia.   Ct scan confirms pneumonia.      Senait Orona MD    Interval History   Afebrile   Slightly improvement   On RA     Physical Exam   Temp: 97.6  F (36.4  C) Temp src: Oral BP: 96/55 Pulse: 85 Heart Rate: 100 Resp: 18 SpO2: 91 % O2 Device: None (Room air)    Vitals:    09/16/19 1754 09/18/19 0615   Weight: 70.3 kg (155 lb) 69.9 kg (154 lb 1.6 oz)     Vital Signs with Ranges  Temp:  [97.6  F (36.4  C)-99.5  F (37.5  C)] 97.6  F (36.4  C)  Pulse:  [] 85  Heart Rate:  [] 100  Resp:  [18] 18  BP: ()/(47-58) 96/55  SpO2:  [91 %-97 %] 91 %    Constitutional: Awake  Lungs: Clear to auscultation bilaterally, no crackles or wheezing  Cardiovascular: Regular rate and rhythm, normal S1 and S2, and no murmur noted  Abdomen: Normal bowel sounds, soft, non-distended, non-tender  Skin: No rashes, no cyanosis, no edema  Other:    Medications     IV fluid REPLACEMENT ONLY       sodium chloride 100 mL/hr at 09/18/19 0313       acetylcysteine  2 mL Nebulization Q6H     albuterol  2.5 mg Nebulization Q4H While awake     aspirin  81 mg Oral or Feeding Tube Daily     Brivaracetam  100 mg  Oral or Feeding Tube BID     carBAMazepine  150 mg Oral or Feeding Tube Q6H     ferrous sulfate  220 mg Per Feeding Tube Daily     fiber modular (NUTRISOURCE FIBER)  1 packet Per Feeding Tube Daily     hydrocortisone  10 mg Oral or Feeding Tube Daily with supper     hydrocortisone  20 mg Oral or Feeding Tube QAM     levothyroxine  150 mcg Per Feeding Tube QAM     melatonin  6 mg Per Feeding Tube At Bedtime     metoclopramide  5 mg Per Feeding Tube BID     miconazole   Topical BID     mupirocin   Topical TID     pantoprazole  40 mg Per J Tube Daily     piperacillin-tazobactam  4.5 g Intravenous Q6H     potassium & sodium phosphates  1 packet Per Feeding Tube TID     protein modular  1 packet Per Feeding Tube BID     sodium chloride (PF)  3 mL Intracatheter Q8H       Data   All microbiology laboratory data reviewed.  Recent Labs   Lab Test 09/18/19  0908 09/17/19  0836 09/16/19  1713   WBC 6.7 7.7 10.1   HGB 10.1* 9.7* 12.8*   HCT 30.7* 29.4* 37.6*   MCV 86 86 86    121* 153     Recent Labs   Lab Test 09/18/19  0908 09/17/19  0836 09/16/19  1713   CR 0.89 0.85 0.85     No lab results found.  Recent Labs   Lab Test 09/16/19  1714 09/16/19  1649 09/13/19  2124 09/13/19  2009 08/12/19  2300 08/12/19  1521 08/12/19  1405 08/12/19  1314 06/17/19  1931   CULT Cultured on the 2nd day of incubation:  Gram positive cocci in clusters  *  Critical Value/Significant Value, preliminary result only, called to and read back by  Torito Chou RN 9/18/19 @ 0539 TF    (Note)  POSITIVE for Staphylococci other than S.aureus, S.epidermidis and  S.lugdunensis, by SuperDimension multiplex nucleic acid test.  Coagulase-negative staphylococci are the most common venipuncture or  collection associated skin CONTAMINANTS grown in blood cultures.  Final identification and antimicrobial susceptibility testing will be  verified by standard methods.    Specimen tested with Novanigene multiplex, gram-positive blood culture  nucleic acid test for  the following targets: Staph aureus, Staph  epidermidis, Staph lugdunensis, other Staph species, Enterococcus  faecalis, Enterococcus faecium, Streptococcus species, S. agalactiae,  S. anginosus grp., S. pneumoniae, S. pyogenes, Listeria sp., mecA  (methicillin resistance) and Alberto/B (vancomycin resistance).    Critical Value/Significant Value called to and read back by MARI QUINTANA RN @0830 9/18/19. CT    No growth after 2 days No growth No growth No growth after 4 days Methicillin resistant Staphylococcus aureus (MRSA) isolated* >10 Squamous epithelial cells/low power field indicates oral contamination. Please   recollect.  *  Canceled, Test credited  Notification of test cancellation was given to  Katie Jones RN, @0603 08/12/19..   No growth  >100,000 colonies/mL  Lactose fermenting gram negative rods  *  >100,000 colonies/mL  Non lactose fermenting gram negative rods  *  >100,000 colonies/mL  Gram positive cocci  Strain 1  *  10,000 to 50,000 colonies/mL  Gram positive cocci  Strain 2  *  Susceptibility testing not routinely done  Susceptibility upon request   No growth No growth       Attestation:  Total time on the floor involved in the patient's care: 35 minutes. Total time spent in counseling/care coordination: >50%

## 2019-09-18 NOTE — PROGRESS NOTES
[facility]    Hospitalist Progress Note    Date of Service (when I saw the patient): 09/18/2019    Assessment & Plan   Keyon Farias is a 59 year old male who was admitted on 9/16/2019.  Assessment & Plan     Keyon Farias is a 59 year old male who presents with high fevers in setting of likely aspiration pneumonia     Fever  Sepsis likely secondary to recurrent aspiration pneumonia  Mr Farias has a complicated history of frequent infections and sepsis. Admitted 8/12/19-8/16/19 for septic shock related to UTI, discharged on 7 day course of IV Levaquin which he completed mid August. Had fever of 104 9/13 which prompted an observation admission, respiratory virus panel was negative at that time and no source of fever found. Discharged on PO levaquin on 9/15/19. In the past 24 hours, he was a little more tired, did not go to his program, but otherwise was no different. Mother checked his temperature and saw temp of 103 at home and called EMS. He then vomited and likely aspirated per his mother. She has not noticed any other new or worrisome symptoms. Lactic acid 2.5 (trended to 1.5 after IVF), WBC 10.1, procalcitonin 0.2. UA bland. CXR with increasing patchy opacity at right lung. ED noted fever of 105.3. Sepsis criteria met with temp and HR >100  - Admit inpatient  - ID consult  - C diff ordered in ED due to loose stools, but per mother his stools are at his baseline (loose due to tube feeds)  - Continue zosyn for presumptive aspiration pneumonia--doubt we can get a good sputum sample from him  - blood cultures one out of 2 postive for gram postive cocci in clusters most likely contaminant. Zosyn should cover it   Repeat blood cultures done today  CT chest, abdomen , pelvis done by ID showed extensive lung infiltrates otherwise negative \  ID consulted and are following   - Trend fevers.fevers coming down since admission   Stop IVF today      Hyponatremia  Has hx mild hyponatremia, was 136 on 9/15.  Received IVF resuscitation in the ED.   Sodium improved from 128 to 133     Thrombocytopenia, improved  Noted during 8/2019 during admission for sepsis. Improved from 9/15 to today with 116--153-121 today   Stable        Seizure disorder  PTA Tegretol and Briviact, continue     Panhypopituitarism  PTA hydrocortisone 20 mg in morning and 10 mg at night, continued     Hypothyroidism  PTA Synthroid, continued     Gonadotropin, hypogonadism.   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 Not interested in placement. Will not consult PT/OT for patient     History of enlarging pancreatic tail cyst  History of necrotizing pancreatitis requiring cystogastrostomy with a stent placement and subsequent removal.  During one of his recent hospitalizations, a CT was noted with enlarging cyst.  This was evaluated by Dr. Davis from GI during last hospitalization, was suggested a followup CT scan in 6 months (5/2019)      DVT Prophylaxis: Pneumatic Compression Devices  Code Status: Full Code  Expected discharge: 2 - 3 days, recommended to prior living arrangement once fevers resolved    Starr Champion MD  [phone] (P)      Interval History    Fevers trended  Down. Blood cultures one out of 2 positive for gram positive cocci in clusters     -Data reviewed today: I reviewed all new labs and imaging results over the last 24 hours. I personally reviewed no images or EKG's today.    Physical Exam   Temp: 97.6  F (36.4  C) Temp src: Oral BP: 96/55 Pulse: 85 Heart Rate: 100 Resp: 18 SpO2: 91 % O2 Device: None (Room air)    Vitals:    09/16/19 1754 09/18/19 0615   Weight: 70.3 kg (155 lb) 69.9 kg (154 lb 1.6 oz)     Vital Signs with Ranges  Temp:  [97.6  F (36.4  C)-99.5  F (37.5  C)] 97.6  F (36.4  C)  Pulse:  [] 85  Heart Rate:  []  100  Resp:  [18] 18  BP: ()/(47-58) 96/55  SpO2:  [91 %-97 %] 91 %  I/O last 3 completed shifts:  In: 634 [NG/GT:634]  Out: -     Constitutional: Awake, alert, cooperative, no apparent distress  Respiratory: Clear to auscultation bilaterally, no crackles or wheezing  Cardiovascular: Regular rate and rhythm, normal S1 and S2, and no murmur noted  GI: Normal bowel sounds, soft, non-distended, non-tender  Skin/Integumen: No rashes, no cyanosis, no edema  Other:     Medications     IV fluid REPLACEMENT ONLY       sodium chloride 100 mL/hr at 09/18/19 1321       acetylcysteine  2 mL Nebulization Q6H     albuterol  2.5 mg Nebulization Q4H While awake     aspirin  81 mg Oral or Feeding Tube Daily     Brivaracetam  100 mg Oral or Feeding Tube BID     carBAMazepine  150 mg Oral or Feeding Tube Q6H     ferrous sulfate  220 mg Per Feeding Tube Daily     fiber modular (NUTRISOURCE FIBER)  1 packet Per Feeding Tube Daily     hydrocortisone  10 mg Oral or Feeding Tube Daily with supper     hydrocortisone  20 mg Oral or Feeding Tube QAM     levothyroxine  150 mcg Per Feeding Tube QAM     melatonin  6 mg Per Feeding Tube At Bedtime     metoclopramide  5 mg Per Feeding Tube BID     miconazole   Topical BID     mupirocin   Topical TID     pantoprazole  40 mg Per J Tube Daily     piperacillin-tazobactam  4.5 g Intravenous Q6H     potassium & sodium phosphates  1 packet Per Feeding Tube TID     protein modular  1 packet Per Feeding Tube BID     sodium chloride (PF)  3 mL Intracatheter Q8H       Data   Recent Labs   Lab 09/18/19  0908 09/17/19  0836 09/16/19  1713  09/13/19 2009   WBC 6.7 7.7 10.1   < > 13.0*   HGB 10.1* 9.7* 12.8*   < > 12.6*   MCV 86 86 86   < > 85    121* 153   < > 137*    132* 128*   < > 130*   POTASSIUM 3.6 3.8 4.2   < > 4.5   CHLORIDE 102 102 93*   < > 95   CO2 24 22 29   < > 30   BUN 10 12 14   < > 15   CR 0.89 0.85 0.85   < > 0.64*   ANIONGAP 7 8 6   < > 5   STEVE 8.1* 7.5* 8.7   < > 8.8   GLC  119* 87 114*   < > 76   ALBUMIN  --   --  3.1*  --  3.4   PROTTOTAL  --   --  8.3  --  8.4   BILITOTAL  --   --  0.3  --  0.2   ALKPHOS  --   --  91  --  113   ALT  --   --  28  --  30   AST  --   --  25  --  19    < > = values in this interval not displayed.       Recent Results (from the past 24 hour(s))   CT Chest/Abdomen/Pelvis w Contrast    Narrative    CT CHEST, ABDOMEN, AND PELVIS WITH CONTRAST 9/17/2019 5:43 PM     HISTORY: Sepsis.    COMPARISON: May 6, 2019    TECHNIQUE: Volumetric helical acquisition of CT images from the lung  apices through the symphysis pubis after the administration of 78mL  Isovue-370 intravenous contrast. Radiation dose for this scan was  reduced using automated exposure control, adjustment of the mA and/or  kV according to patient size, or iterative reconstruction technique.    FINDINGS: Moderately extensive bilateral pulmonary infiltrates right  worse than left. Trace pleural fluid on the right. No left pleural or  pericardial effusion. Normal caliber aorta. Normal heart size.    Abdomen and pelvis: Cystic lesion in the pancreas is stable at 3.8 cm  long axis. Adrenal glands, kidneys, spleen, and liver demonstrate no  worrisome focal lesion. Stable liver cyst. GJ tube with tip in the  jejunum noted. There are no dilated loops of small intestine or large  bowel to suggest ileus or obstruction. No free fluid. No free air.  There are no lymph nodes that are abnormal by size criteria. No  hydronephrosis. No urolithiasis. No intra-abdominal abscess. There are  mild atherosclerotic changes of the visualized aorta and its branches.  There is no evidence of aortic dissection or aneurysm.    Survey of the visualized bony structures demonstrates no destructive  bony lesions.      Impression    IMPRESSION: Moderately extensive pulmonary infiltrates right worse  than left. Minimal right pleural effusion.    BERT ESPINOZA MD

## 2019-09-19 LAB
MAGNESIUM SERPL-MCNC: 2 MG/DL (ref 1.6–2.3)
PHOSPHATE SERPL-MCNC: 2.5 MG/DL (ref 2.5–4.5)
PHOSPHATE SERPL-MCNC: 3.1 MG/DL (ref 2.5–4.5)

## 2019-09-19 PROCEDURE — 12000000 ZZH R&B MED SURG/OB

## 2019-09-19 PROCEDURE — 40000275 ZZH STATISTIC RCP TIME EA 10 MIN

## 2019-09-19 PROCEDURE — 83735 ASSAY OF MAGNESIUM: CPT | Performed by: HOSPITALIST

## 2019-09-19 PROCEDURE — 27210429 ZZH NUTRITION PRODUCT INTERMEDIATE LITER

## 2019-09-19 PROCEDURE — 36415 COLL VENOUS BLD VENIPUNCTURE: CPT | Performed by: INTERNAL MEDICINE

## 2019-09-19 PROCEDURE — 84100 ASSAY OF PHOSPHORUS: CPT | Performed by: INTERNAL MEDICINE

## 2019-09-19 PROCEDURE — 36415 COLL VENOUS BLD VENIPUNCTURE: CPT | Performed by: HOSPITALIST

## 2019-09-19 PROCEDURE — 94640 AIRWAY INHALATION TREATMENT: CPT | Mod: 76

## 2019-09-19 PROCEDURE — 94640 AIRWAY INHALATION TREATMENT: CPT

## 2019-09-19 PROCEDURE — 25000132 ZZH RX MED GY IP 250 OP 250 PS 637: Mod: GY | Performed by: HOSPITALIST

## 2019-09-19 PROCEDURE — 25000125 ZZHC RX 250: Performed by: HOSPITALIST

## 2019-09-19 PROCEDURE — 25000128 H RX IP 250 OP 636: Performed by: HOSPITALIST

## 2019-09-19 PROCEDURE — 99232 SBSQ HOSP IP/OBS MODERATE 35: CPT | Performed by: INTERNAL MEDICINE

## 2019-09-19 PROCEDURE — 84100 ASSAY OF PHOSPHORUS: CPT | Performed by: HOSPITALIST

## 2019-09-19 RX ADMIN — CARBAMAZEPINE 150 MG: 100 SUSPENSION ORAL at 12:59

## 2019-09-19 RX ADMIN — Medication 1 PACKET: at 21:10

## 2019-09-19 RX ADMIN — MINERAL SUPPLEMENT IRON 300 MG / 5 ML STRENGTH LIQUID 100 PER BOX UNFLAVORED 220 MG: at 07:54

## 2019-09-19 RX ADMIN — ASPIRIN 81 MG 81 MG: 81 TABLET ORAL at 07:54

## 2019-09-19 RX ADMIN — POTASSIUM & SODIUM PHOSPHATES POWDER PACK 280-160-250 MG 1 PACKET: 280-160-250 PACK at 21:06

## 2019-09-19 RX ADMIN — ACETYLCYSTEINE 2 ML: 200 SOLUTION ORAL; RESPIRATORY (INHALATION) at 12:44

## 2019-09-19 RX ADMIN — IPRATROPIUM BROMIDE AND ALBUTEROL SULFATE 3 ML: .5; 3 SOLUTION RESPIRATORY (INHALATION) at 18:59

## 2019-09-19 RX ADMIN — CARBAMAZEPINE 150 MG: 100 SUSPENSION ORAL at 06:03

## 2019-09-19 RX ADMIN — METOCLOPRAMIDE 5 MG: 5 SOLUTION ORAL at 21:07

## 2019-09-19 RX ADMIN — PIPERACILLIN AND TAZOBACTAM 4.5 G: 4; .5 INJECTION, POWDER, FOR SOLUTION INTRAVENOUS at 00:59

## 2019-09-19 RX ADMIN — ALBUTEROL SULFATE 2.5 MG: 2.5 SOLUTION RESPIRATORY (INHALATION) at 00:47

## 2019-09-19 RX ADMIN — HYDROCORTISONE 10 MG: 10 TABLET ORAL at 17:00

## 2019-09-19 RX ADMIN — POTASSIUM & SODIUM PHOSPHATES POWDER PACK 280-160-250 MG 1 PACKET: 280-160-250 PACK at 17:00

## 2019-09-19 RX ADMIN — MELATONIN TAB 3 MG 6 MG: 3 TAB at 21:06

## 2019-09-19 RX ADMIN — MICONAZOLE NITRATE: 20 POWDER TOPICAL at 21:07

## 2019-09-19 RX ADMIN — ACETYLCYSTEINE 2 ML: 200 SOLUTION ORAL; RESPIRATORY (INHALATION) at 18:59

## 2019-09-19 RX ADMIN — Medication 40 MG: at 07:55

## 2019-09-19 RX ADMIN — ACETYLCYSTEINE 2 ML: 200 SOLUTION ORAL; RESPIRATORY (INHALATION) at 08:06

## 2019-09-19 RX ADMIN — HYDROCORTISONE 20 MG: 20 TABLET ORAL at 07:53

## 2019-09-19 RX ADMIN — PIPERACILLIN AND TAZOBACTAM 4.5 G: 4; .5 INJECTION, POWDER, FOR SOLUTION INTRAVENOUS at 06:03

## 2019-09-19 RX ADMIN — CARBAMAZEPINE 150 MG: 100 SUSPENSION ORAL at 17:17

## 2019-09-19 RX ADMIN — BRIVARACETAM 100 MG: 10 SOLUTION ORAL at 21:06

## 2019-09-19 RX ADMIN — BRIVARACETAM 100 MG: 10 SOLUTION ORAL at 09:40

## 2019-09-19 RX ADMIN — MUPIROCIN: 20 OINTMENT TOPICAL at 07:56

## 2019-09-19 RX ADMIN — Medication 1 PACKET: at 12:59

## 2019-09-19 RX ADMIN — LEVOTHYROXINE SODIUM 150 MCG: 150 TABLET ORAL at 06:03

## 2019-09-19 RX ADMIN — PIPERACILLIN AND TAZOBACTAM 4.5 G: 4; .5 INJECTION, POWDER, FOR SOLUTION INTRAVENOUS at 12:58

## 2019-09-19 RX ADMIN — MICONAZOLE NITRATE: 20 POWDER TOPICAL at 07:55

## 2019-09-19 RX ADMIN — CARBAMAZEPINE 150 MG: 100 SUSPENSION ORAL at 00:59

## 2019-09-19 RX ADMIN — ACETYLCYSTEINE 2 ML: 200 SOLUTION ORAL; RESPIRATORY (INHALATION) at 00:34

## 2019-09-19 RX ADMIN — Medication 1 PACKET: at 13:10

## 2019-09-19 RX ADMIN — POTASSIUM & SODIUM PHOSPHATES POWDER PACK 280-160-250 MG 1 PACKET: 280-160-250 PACK at 07:53

## 2019-09-19 RX ADMIN — IPRATROPIUM BROMIDE AND ALBUTEROL SULFATE 3 ML: .5; 3 SOLUTION RESPIRATORY (INHALATION) at 12:44

## 2019-09-19 RX ADMIN — MUPIROCIN: 20 OINTMENT TOPICAL at 21:07

## 2019-09-19 RX ADMIN — IPRATROPIUM BROMIDE AND ALBUTEROL SULFATE 3 ML: .5; 3 SOLUTION RESPIRATORY (INHALATION) at 08:06

## 2019-09-19 RX ADMIN — METOCLOPRAMIDE 5 MG: 5 SOLUTION ORAL at 09:42

## 2019-09-19 RX ADMIN — PIPERACILLIN AND TAZOBACTAM 4.5 G: 4; .5 INJECTION, POWDER, FOR SOLUTION INTRAVENOUS at 17:17

## 2019-09-19 RX ADMIN — MUPIROCIN: 20 OINTMENT TOPICAL at 17:01

## 2019-09-19 ASSESSMENT — ACTIVITIES OF DAILY LIVING (ADL)
ADLS_ACUITY_SCORE: 40
ADLS_ACUITY_SCORE: 40
ADLS_ACUITY_SCORE: 44
ADLS_ACUITY_SCORE: 42
ADLS_ACUITY_SCORE: 40
ADLS_ACUITY_SCORE: 44

## 2019-09-19 NOTE — PROGRESS NOTES
[facility]    Hospitalist Progress Note    Date of Service (when I saw the patient): 09/19/2019    Assessment & Plan   Keyon Farias is a 59 year old male who was admitted on 9/16/2019.  Assessment & Plan     Keyon Farias is a 59 year old male who presents with high fevers in setting of likely aspiration pneumonia     Fever  Sepsis likely secondary to recurrent aspiration pneumonia  Mr Farias has a complicated history of frequent infections and sepsis. Admitted 8/12/19-8/16/19 for septic shock related to UTI, discharged on 7 day course of IV Levaquin which he completed mid August. Had fever of 104 9/13 which prompted an observation admission, respiratory virus panel was negative at that time and no source of fever found. Discharged on PO levaquin on 9/15/19. In the past 24 hours, he was a little more tired, did not go to his program, but otherwise was no different. Mother checked his temperature and saw temp of 103 at home and called EMS. He then vomited and likely aspirated per his mother. She has not noticed any other new or worrisome symptoms. Lactic acid 2.5 (trended to 1.5 after IVF), WBC 10.1, procalcitonin 0.2. UA bland. CXR with increasing patchy opacity at right lung. ED noted fever of 105.3. Sepsis criteria met with temp and HR >100  - Admit inpatient  - ID consult  - C diff ordered in ED due to loose stools, but per mother his stools are at his baseline (loose due to tube feeds)  - Continue zosyn for presumptive aspiration pneumonia--doubt we can get a good sputum sample from him  - blood cultures one out of 2 postive for gram postive cocci in clusters which turned out to be staph hominis  most likely contaminant. Zosyn should cover it   Repeat blood cultures done on 9/18 remain negative   CT chest, abdomen , pelvis done by ID showed extensive lung infiltrates otherwise negative \  ID consulted and are following   - Trend fevers.fevers coming down since admission   Stop IVF on 9/18        Hyponatremia  Has hx mild hyponatremia, was 136 on 9/15. Received IVF resuscitation in the ED.   Sodium improved from 128 to 133     Thrombocytopenia, improved  Noted during 8/2019 during admission for sepsis. Improved from 9/15 to today with 116--153-121 today   Stable        Seizure disorder  PTA Tegretol and Briviact, continue     Panhypopituitarism  PTA hydrocortisone 20 mg in morning and 10 mg at night, continued     Hypothyroidism  PTA Synthroid, continued     Gonadotropin, hypogonadism.   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 Not interested in placement. Will not consult PT/OT for patient     History of enlarging pancreatic tail cyst  History of necrotizing pancreatitis requiring cystogastrostomy with a stent placement and subsequent removal.  During one of his recent hospitalizations, a CT was noted with enlarging cyst.  This was evaluated by Dr. Davis from GI during last hospitalization, was suggested a followup CT scan in 6 months (5/2019)      DVT Prophylaxis: Pneumatic Compression Devices  Code Status: Full Code  Expected discharge: tomorrow if remains stable      Starr Champion MD  [phone] (P)      Interval History    Fevers trended  Down. Stable since admission. No new issues      -Data reviewed today: I reviewed all new labs and imaging results over the last 24 hours. I personally reviewed no images or EKG's today.    Physical Exam   Temp: 97.8  F (36.6  C) Temp src: Oral BP: 129/67 Pulse: 64 Heart Rate: 63 Resp: 16 SpO2: 97 % O2 Device: None (Room air)    Vitals:    09/16/19 1754 09/18/19 0615   Weight: 70.3 kg (155 lb) 69.9 kg (154 lb 1.6 oz)     Vital Signs with Ranges  Temp:  [97.8  F (36.6  C)-98.6  F (37  C)] 97.8  F (36.6  C)  Pulse:  [64-83] 64  Heart Rate:  [63-78] 63  Resp:  [16-18]  16  BP: ()/(55-67) 129/67  SpO2:  [92 %-98 %] 97 %  I/O last 3 completed shifts:  In: 180 [NG/GT:180]  Out: -     Constitutional: Awake, alert, cooperative, no apparent distress  Respiratory: Clear to auscultation bilaterally, no crackles or wheezing  Cardiovascular: Regular rate and rhythm, normal S1 and S2, and no murmur noted  GI: Normal bowel sounds, soft, non-distended, non-tender  Skin/Integumen: No rashes, no cyanosis, no edema  Other:     Medications     IV fluid REPLACEMENT ONLY         acetylcysteine  2 mL Nebulization Q6H     albuterol  2.5 mg Nebulization Q4H While awake     aspirin  81 mg Oral or Feeding Tube Daily     Brivaracetam  100 mg Oral or Feeding Tube BID     carBAMazepine  150 mg Oral or Feeding Tube Q6H     ferrous sulfate  220 mg Per Feeding Tube Daily     fiber modular (NUTRISOURCE FIBER)  1 packet Per Feeding Tube Daily     hydrocortisone  10 mg Oral or Feeding Tube Daily with supper     hydrocortisone  20 mg Oral or Feeding Tube QAM     levothyroxine  150 mcg Per Feeding Tube QAM     melatonin  6 mg Per Feeding Tube At Bedtime     metoclopramide  5 mg Per Feeding Tube BID     miconazole   Topical BID     mupirocin   Topical TID     pantoprazole  40 mg Per J Tube Daily     piperacillin-tazobactam  4.5 g Intravenous Q6H     potassium & sodium phosphates  1 packet Per Feeding Tube TID     protein modular  1 packet Per Feeding Tube BID     sodium chloride (PF)  3 mL Intracatheter Q8H       Data   Recent Labs   Lab 09/18/19  0908 09/17/19  0836 09/16/19  1713  09/13/19 2009   WBC 6.7 7.7 10.1   < > 13.0*   HGB 10.1* 9.7* 12.8*   < > 12.6*   MCV 86 86 86   < > 85    121* 153   < > 137*    132* 128*   < > 130*   POTASSIUM 3.6 3.8 4.2   < > 4.5   CHLORIDE 102 102 93*   < > 95   CO2 24 22 29   < > 30   BUN 10 12 14   < > 15   CR 0.89 0.85 0.85   < > 0.64*   ANIONGAP 7 8 6   < > 5   STEVE 8.1* 7.5* 8.7   < > 8.8   * 87 114*   < > 76   ALBUMIN  --   --  3.1*  --  3.4    PROTTOTAL  --   --  8.3  --  8.4   BILITOTAL  --   --  0.3  --  0.2   ALKPHOS  --   --  91  --  113   ALT  --   --  28  --  30   AST  --   --  25  --  19    < > = values in this interval not displayed.       No results found for this or any previous visit (from the past 24 hour(s)).

## 2019-09-19 NOTE — PLAN OF CARE
DATE & TIME: 9/18 from 1900 from 0730    Cognitive Concerns/ Orientation : HECTOR, non-verbal   BEHAVIOR & AGGRESSION TOOL COLOR: Green, calm and cooperative for cares  CIWA SCORE: N/A   ABNL VS/O2: VSS on RA  MOBILITY: Total cares, Turn and reposition Q 2 hours  PAIN MANAGMENT: Appears comfortable  DIET: NPO, on tube feeding from 0700 to 1900  BOWEL/BLADDER: Incontinent to bladder and bowel  ABNL LAB/BG: Ca 8.1, Hgb 10.1  DRAIN/DEVICES: PIV saline lock  TELEMETRY RHYTHM: NSR  SKIN: pale, bruised, memo area excoriated, reddened. Treatment done per WOC order.   TESTS/PROCEDURES: N/A  D/C DAY/GOALS/PLACE: Discharge pending for clinical improvement  OTHER IMPORTANT INFO: Lung sound diminished with infrequent non-productive cough. Oral cares done. Tube feeding patent and clamped. H2O flushes 150 mL Q 4 hours.

## 2019-09-19 NOTE — PROGRESS NOTES
[facility]    Infectious Disease Progress Note    Date of Service (when I saw the patient): 09/19/2019     Assessment & Plan   Keyon Farias is a 59 year old male who was admitted on 9/16/2019.     Impression:  1. 59 y.o male with TBI.   2. Panhypopituitarism, spastic hemiplegia, seizures, chronic dysphagia, upper extremity DVT.   3. History of Necrotizing pancreatitis.   4. Presented with fever, just admitted on 09/13/2019 for a SIRS and a fever without identified source. The patient was discharged 09/16/2019 with a prescription for Levaquin.   5. He is unable to give any history.   6. He was started on IV zosyn.   7. UA is negative, BC pending. X- ray with possible worsening of pneumonia.   8. 1/2 positive blood culture for staph hominis most likely a contaminant      Recommendations:   Improving on zosyn, continue     Senait Orona MD    Interval History   Afebrile   Improving   On RA     Physical Exam   Temp: 97.8  F (36.6  C) Temp src: Oral BP: 129/67 Pulse: 64 Heart Rate: 63 Resp: 16 SpO2: 94 % O2 Device: None (Room air)    Vitals:    09/16/19 1754 09/18/19 0615   Weight: 70.3 kg (155 lb) 69.9 kg (154 lb 1.6 oz)     Vital Signs with Ranges  Temp:  [97.8  F (36.6  C)-98.6  F (37  C)] 97.8  F (36.6  C)  Pulse:  [64-83] 64  Heart Rate:  [63-78] 63  Resp:  [16-18] 16  BP: ()/(55-67) 129/67  SpO2:  [92 %-98 %] 94 %    Constitutional: Awake, non verbal   Lungs: Clear to auscultation bilaterally, no crackles or wheezing  Cardiovascular: Regular rate and rhythm, normal S1 and S2, and no murmur noted  Abdomen: Normal bowel sounds, soft, non-distended, non-tender  Skin: No rashes, no cyanosis, no edema  Other:    Medications     IV fluid REPLACEMENT ONLY         acetylcysteine  2 mL Nebulization Q6H     albuterol  2.5 mg Nebulization Q4H While awake     aspirin  81 mg Oral or Feeding Tube Daily     Brivaracetam  100 mg Oral or Feeding Tube BID     carBAMazepine  150 mg Oral or Feeding Tube Q6H      ferrous sulfate  220 mg Per Feeding Tube Daily     fiber modular (NUTRISOURCE FIBER)  1 packet Per Feeding Tube Daily     hydrocortisone  10 mg Oral or Feeding Tube Daily with supper     hydrocortisone  20 mg Oral or Feeding Tube QAM     levothyroxine  150 mcg Per Feeding Tube QAM     melatonin  6 mg Per Feeding Tube At Bedtime     metoclopramide  5 mg Per Feeding Tube BID     miconazole   Topical BID     mupirocin   Topical TID     pantoprazole  40 mg Per J Tube Daily     piperacillin-tazobactam  4.5 g Intravenous Q6H     potassium & sodium phosphates  1 packet Per Feeding Tube TID     protein modular  1 packet Per Feeding Tube BID     sodium chloride (PF)  3 mL Intracatheter Q8H       Data   All microbiology laboratory data reviewed.  Recent Labs   Lab Test 09/18/19  0908 09/17/19  0836 09/16/19  1713   WBC 6.7 7.7 10.1   HGB 10.1* 9.7* 12.8*   HCT 30.7* 29.4* 37.6*   MCV 86 86 86    121* 153     Recent Labs   Lab Test 09/18/19  0908 09/17/19  0836 09/16/19  1713   CR 0.89 0.85 0.85     No lab results found.  Recent Labs   Lab Test 09/18/19  0911 09/16/19  1714 09/16/19  1649 09/13/19  2124 09/13/19 2009 08/12/19  2300 08/12/19  1521 08/12/19  1405 08/12/19  1314   CULT No growth after 17 hours  No growth after 17 hours No growth after 3 days  Cultured on the 2nd day of incubation:  Staphylococcus hominis  *  Critical Value/Significant Value, preliminary result only, called to and read back by  Torito Chou RN 9/18/19 @ 0539 TF    Susceptibility testing in progress  (Note)  POSITIVE for Staphylococci other than S.aureus, S.epidermidis and  S.lugdunensis, by [employer] nucleic acid test.  Coagulase-negative staphylococci are the most common venipuncture or  collection associated skin CONTAMINANTS grown in blood cultures.  Final identification and antimicrobial susceptibility testing will be  verified by standard methods.    Specimen tested with NicOx multiplex, gram-positive blood  culture  nucleic acid test for the following targets: Staph aureus, Staph  epidermidis, Staph lugdunensis, other Staph species, Enterococcus  faecalis, Enterococcus faecium, Streptococcus species, S. agalactiae,  S. anginosus grp., S. pneumoniae, S. pyogenes, Listeria sp., mecA  (methicillin resistance) and Alberto/B (vancomycin resistance).    Critical Value/Significant Value called to and read back by MARI QUINTANA RN @0867 9/18/19. CT   No growth No growth No growth after 5 days Methicillin resistant Staphylococcus aureus (MRSA) isolated* >10 Squamous epithelial cells/low power field indicates oral contamination. Please   recollect.  *  Canceled, Test credited  Notification of test cancellation was given to  Katie Jones RN, @8379 08/12/19..   No growth  >100,000 colonies/mL  Lactose fermenting gram negative rods  *  >100,000 colonies/mL  Non lactose fermenting gram negative rods  *  >100,000 colonies/mL  Gram positive cocci  Strain 1  *  10,000 to 50,000 colonies/mL  Gram positive cocci  Strain 2  *  Susceptibility testing not routinely done  Susceptibility upon request   No growth       Attestation:  Total time on the floor involved in the patient's care: 35 minutes. Total time spent in counseling/care coordination: >50%

## 2019-09-19 NOTE — PLAN OF CARE
Febrile. No sign of pain. Frequent on-productive cough. On room air. Diminished lung sounds.  Up in the chair once with a ceiling lift. Turned and repositioned q 2 hrs. Periarea and buttocks are red and excoriated-complete Wound cares. Receiving IV abx.

## 2019-09-19 NOTE — PLAN OF CARE
DATE & TIME: 09/18/2019 [id number]    Cognitive Concerns/ Orientation : Non-Verbal, HECTOR orientation    BEHAVIOR & AGGRESSION TOOL COLOR: Green, calm and cooperative   CIWA SCORE: N/A   ABNL VS/O2: VSS on room air  MOBILITY: Total Care, turned and re-positioned q2h  PAIN MANAGMENT: No signs of pain  DIET: Tolerating Tube Feed, Tube Feed stopped at 1850, per orders  BOWEL/BLADDER: Incontinent, voids frequently  ABNL LAB/BG: Hgb 10.1 / Hematocrit 30.7 / RBC Count 3.59  DRAIN/DEVICES: PIV is saline locked  TELEMETRY RHYTHM: NSR  SKIN: Skin, pale and bruised. Jessica area and buttocks are red and excoriated, cares completed   TESTS/PROCEDURES: N/A  D/C DAY/GOALS/PLACE: Pending clinical progress  OTHER IMPORTANT INFO: Non-productive cough

## 2019-09-20 VITALS
HEIGHT: 72 IN | HEART RATE: 78 BPM | TEMPERATURE: 97.6 F | SYSTOLIC BLOOD PRESSURE: 90 MMHG | BODY MASS INDEX: 20.87 KG/M2 | WEIGHT: 154.1 LBS | DIASTOLIC BLOOD PRESSURE: 54 MMHG | OXYGEN SATURATION: 95 % | RESPIRATION RATE: 16 BRPM

## 2019-09-20 PROCEDURE — 99239 HOSP IP/OBS DSCHRG MGMT >30: CPT | Performed by: INTERNAL MEDICINE

## 2019-09-20 PROCEDURE — 94640 AIRWAY INHALATION TREATMENT: CPT

## 2019-09-20 PROCEDURE — 25000132 ZZH RX MED GY IP 250 OP 250 PS 637: Mod: GY | Performed by: HOSPITALIST

## 2019-09-20 PROCEDURE — 94640 AIRWAY INHALATION TREATMENT: CPT | Mod: 76

## 2019-09-20 PROCEDURE — 25000128 H RX IP 250 OP 636: Performed by: HOSPITALIST

## 2019-09-20 PROCEDURE — 25000125 ZZHC RX 250: Performed by: HOSPITALIST

## 2019-09-20 PROCEDURE — 40000275 ZZH STATISTIC RCP TIME EA 10 MIN

## 2019-09-20 RX ADMIN — PIPERACILLIN AND TAZOBACTAM 4.5 G: 4; .5 INJECTION, POWDER, FOR SOLUTION INTRAVENOUS at 00:19

## 2019-09-20 RX ADMIN — MICONAZOLE NITRATE: 20 POWDER TOPICAL at 08:48

## 2019-09-20 RX ADMIN — ASPIRIN 81 MG 81 MG: 81 TABLET ORAL at 08:48

## 2019-09-20 RX ADMIN — Medication 40 MG: at 08:47

## 2019-09-20 RX ADMIN — CARBAMAZEPINE 150 MG: 100 SUSPENSION ORAL at 00:19

## 2019-09-20 RX ADMIN — ACETYLCYSTEINE: 200 SOLUTION ORAL; RESPIRATORY (INHALATION) at 07:03

## 2019-09-20 RX ADMIN — HYDROCORTISONE 20 MG: 20 TABLET ORAL at 08:48

## 2019-09-20 RX ADMIN — IPRATROPIUM BROMIDE AND ALBUTEROL SULFATE 3 ML: .5; 3 SOLUTION RESPIRATORY (INHALATION) at 07:03

## 2019-09-20 RX ADMIN — CARBAMAZEPINE 150 MG: 100 SUSPENSION ORAL at 06:09

## 2019-09-20 RX ADMIN — MUPIROCIN: 20 OINTMENT TOPICAL at 08:48

## 2019-09-20 RX ADMIN — BRIVARACETAM 100 MG: 10 SOLUTION ORAL at 08:48

## 2019-09-20 RX ADMIN — IPRATROPIUM BROMIDE AND ALBUTEROL SULFATE 3 ML: .5; 3 SOLUTION RESPIRATORY (INHALATION) at 10:37

## 2019-09-20 RX ADMIN — POTASSIUM & SODIUM PHOSPHATES POWDER PACK 280-160-250 MG 1 PACKET: 280-160-250 PACK at 08:48

## 2019-09-20 RX ADMIN — PIPERACILLIN AND TAZOBACTAM 4.5 G: 4; .5 INJECTION, POWDER, FOR SOLUTION INTRAVENOUS at 06:08

## 2019-09-20 RX ADMIN — METOCLOPRAMIDE 5 MG: 5 SOLUTION ORAL at 08:47

## 2019-09-20 RX ADMIN — MINERAL SUPPLEMENT IRON 300 MG / 5 ML STRENGTH LIQUID 100 PER BOX UNFLAVORED 220 MG: at 08:48

## 2019-09-20 RX ADMIN — CARBAMAZEPINE 150 MG: 100 SUSPENSION ORAL at 12:01

## 2019-09-20 RX ADMIN — LEVOTHYROXINE SODIUM 150 MCG: 150 TABLET ORAL at 07:46

## 2019-09-20 ASSESSMENT — ACTIVITIES OF DAILY LIVING (ADL)
ADLS_ACUITY_SCORE: 40

## 2019-09-20 NOTE — PLAN OF CARE
DATE & TIME: 9/18/19 adarsh  Cognitive Concerns/ Orientation : Non-Verbal, HECTOR orientation    BEHAVIOR & AGGRESSION TOOL COLOR: Green, calm and cooperative   CIWA SCORE: N/A      ABNL VS/O2: VSS on room air  MOBILITY: Total Care, turned and re-positioned q2h  PAIN MANAGMENT: No signs of pain/discomfort  DIET: tube feeding infusing  BOWEL/BLADDER: Incontinent ob both, lg loose BM tonight  ABNL LAB/BG:none today  DRAIN/DEVICES: PIV SL  TELEMETRY RHYTHM: NA  SKIN: Skin, pale and bruised. Jessica area and buttocks are red and excoriated-cares completed  TESTS/PROCEDURES: N/A  D/C DAY/GOALS/PLACE: Pending progress  OTHER IMPORTANT INFO: Frequent NPC, LS-diminished, turned and repositioned q 2 hrs, continues on Zosyn/Nebs, TF stopped at 1900.

## 2019-09-20 NOTE — CONSULTS
Care Transition Initial Assessment - RN   Conversed with pt's Mother Savannah, that is also his legal Guardian via phone this AM      DATA   Active Problems:    Fever       Cognitive Status: Hx TBI, nonverbal      Contact information and PCP information verified: Yes  Lives With: parent(s)      Insurance concerns: No Insurance issues identified  ASSESSMENT  Patient currently receives the following services: [facility] OT/PT, [facility] RN 12 Hr coverage daily and 12 hr PCA adarsh/night thorugh [facility] (currently 80.5 hours every 7 days) and TF with supplies through [facility]        Identified issues/concerns regarding health management:Planning potential discharge home today.  Have conversed with Savannah.  She wants to ensure that pt has not had a temp over 97.8 axillary in the past 24hrs due to repeated admissions.  As noted before in communication with pt's PCA, that lives with them, pt typically will have an emesis and quickly goes down hill after that, due to aspiration.  All measures are put in place to help prevent this at baseline.  Savannah would like the nurse and rounding Hospitalist to call her this AM.  [employer] transportation has been set up via stretcher for 1200 today with tentative discharge plan.  Pt's home care RN should be there at that time, not sooner.     PLAN  Financial costs for the patient include:NA.    Patient/family is agreeable to the plan?  Yes: if afebrile    Patient anticipates needs for home equipment: No     Appointments:  PCP 9/23/19      Care  (CTS) will continue to follow as needed.

## 2019-09-20 NOTE — PROGRESS NOTES
[facility] Progress Note  [facility]    Initiated visit due to LOS.  Pt was alone in room.  Pt is non-verbal, but agreed to wanting a prayer from SH.  SH offered prayer for pt.  SH will follow-up as needed.      Asim Max  Chaplain Resident

## 2019-09-20 NOTE — PROGRESS NOTES
[facility]    Infectious Disease Progress Note    Date of Service (when I saw the patient): 09/20/2019     Assessment & Plan   Keoyn Farias is a 59 year old male who was admitted on 9/16/2019.     Impression:  1. 59 y.o male with TBI.   2. Panhypopituitarism, spastic hemiplegia, seizures, chronic dysphagia, upper extremity DVT.   3. History of Necrotizing pancreatitis.   4. Presented with fever, just admitted on 09/13/2019 for a SIRS and a fever without identified source. The patient was discharged 09/16/2019 with a prescription for Levaquin.   5. He is unable to give any history.   6. He was started on IV zosyn.   7. UA is negative, BC pending. X- ray with possible worsening of pneumonia.   8. 1/2 positive blood culture for staph hominis most likely a contaminant      Recommendations:   Improving on zosyn, continue for a total of 7 days and then stop, treatment for aspiration pneumonia.      Senait Orona MD    Interval History   Afebrile   Improving   On RA     Physical Exam   Temp: 97.6  F (36.4  C) Temp src: Axillary BP: 90/54 Pulse: 78 Heart Rate: 67 Resp: 16 SpO2: 95 % O2 Device: None (Room air)    Vitals:    09/16/19 1754 09/18/19 0615   Weight: 70.3 kg (155 lb) 69.9 kg (154 lb 1.6 oz)     Vital Signs with Ranges  Temp:  [97.6  F (36.4  C)-98.5  F (36.9  C)] 97.6  F (36.4  C)  Pulse:  [77-78] 78  Heart Rate:  [67-93] 67  Resp:  [16] 16  BP: ()/(51-56) 90/54  SpO2:  [92 %-97 %] 95 %    Constitutional: Awake, non verbal   Lungs: Clear to auscultation bilaterally, no crackles or wheezing  Cardiovascular: Regular rate and rhythm, normal S1 and S2, and no murmur noted  Abdomen: Normal bowel sounds, soft, non-distended, non-tender  Skin: No rashes, no cyanosis, no edema  Other:    Medications     IV fluid REPLACEMENT ONLY         acetylcysteine  2 mL Nebulization Q6H     albuterol  2.5 mg Nebulization Q4H While awake     aspirin  81 mg Oral or Feeding Tube Daily     Brivaracetam  100 mg Oral  or Feeding Tube BID     carBAMazepine  150 mg Oral or Feeding Tube Q6H     ferrous sulfate  220 mg Per Feeding Tube Daily     fiber modular (NUTRISOURCE FIBER)  1 packet Per Feeding Tube Daily     hydrocortisone  10 mg Oral or Feeding Tube Daily with supper     hydrocortisone  20 mg Oral or Feeding Tube QAM     levothyroxine  150 mcg Per Feeding Tube QAM     melatonin  6 mg Per Feeding Tube At Bedtime     metoclopramide  5 mg Per Feeding Tube BID     miconazole   Topical BID     mupirocin   Topical TID     pantoprazole  40 mg Per J Tube Daily     piperacillin-tazobactam  4.5 g Intravenous Q6H     potassium & sodium phosphates  1 packet Per Feeding Tube TID     protein modular  1 packet Per Feeding Tube BID     sodium chloride (PF)  3 mL Intracatheter Q8H       Data   All microbiology laboratory data reviewed.  Recent Labs   Lab Test 09/18/19  0908 09/17/19  0836 09/16/19  1713   WBC 6.7 7.7 10.1   HGB 10.1* 9.7* 12.8*   HCT 30.7* 29.4* 37.6*   MCV 86 86 86    121* 153     Recent Labs   Lab Test 09/18/19  0908 09/17/19  0836 09/16/19  1713   CR 0.89 0.85 0.85     No lab results found.  Recent Labs   Lab Test 09/18/19  0911 09/16/19  1714 09/16/19  1649 09/13/19  2124 09/13/19  2009 08/12/19  2300 08/12/19  1521 08/12/19  1405 08/12/19  1314   CULT No growth after 2 days  No growth after 2 days Cultured on the 2nd day of incubation:  Staphylococcus hominis  *  Critical Value/Significant Value, preliminary result only, called to and read back by  Torito Chou RN 9/18/19 @ 0539 TF    (Note)  POSITIVE for Staphylococci other than S.aureus, S.epidermidis and  S.lugdunensis, by [employer] nucleic acid test.  Coagulase-negative staphylococci are the most common venipuncture or  collection associated skin CONTAMINANTS grown in blood cultures.  Final identification and antimicrobial susceptibility testing will be  verified by standard methods.    Specimen tested with [employer] multiplex, gram-positive  blood culture  nucleic acid test for the following targets: Staph aureus, Staph  epidermidis, Staph lugdunensis, other Staph species, Enterococcus  faecalis, Enterococcus faecium, Streptococcus species, S. agalactiae,  S. anginosus grp., S. pneumoniae, S. pyogenes, Listeria sp., mecA  (methicillin resistance) and Alberto/B (vancomycin resistance).    Critical Value/Significant Value called to and read back by MARI QUINTANA RN @0815 9/18/19. CT    No growth after 4 days No growth No growth No growth Methicillin resistant Staphylococcus aureus (MRSA) isolated* >10 Squamous epithelial cells/low power field indicates oral contamination. Please   recollect.  *  Canceled, Test credited  Notification of test cancellation was given to  Katie Jones RN, @0948 08/12/19..   No growth  >100,000 colonies/mL  Lactose fermenting gram negative rods  *  >100,000 colonies/mL  Non lactose fermenting gram negative rods  *  >100,000 colonies/mL  Gram positive cocci  Strain 1  *  10,000 to 50,000 colonies/mL  Gram positive cocci  Strain 2  *  Susceptibility testing not routinely done  Susceptibility upon request   No growth       Attestation:  Total time on the floor involved in the patient's care: 35 minutes. Total time spent in counseling/care coordination: >50%

## 2019-09-20 NOTE — DISCHARGE SUMMARY
[facility]  Discharge Summary        Keyon Farias MRN# [id number]   YOB: 1960 Age: 59 year old     Date of Admission:  9/16/2019  Date of Discharge:  9/20/2019  Admitting Physician:  Keeley Claros DO  Discharge Physician: Starr Champion MD  Discharging Service: Hospitalist     Primary Provider: Carlos Gomez  Primary Care Physician Phone Number: [phone]         Discharge Diagnoses/Problem Oriented Hospital Course (Providers):    Keyon Farias was admitted on 9/16/2019 by Keeley Claros DO and I would refer you to their history and physical.  The following problems were addressed during his hospitalization:  Assessment & Plan     Keyon Farias is a 59 year old male who was admitted on 9/16/2019.  Assessment & Plan     Keyon Farias is a 59 year old male who presents with high fevers in setting of likely aspiration pneumonia     Fever  Sepsis likely secondary to recurrent aspiration pneumonia  Mr Farias has a complicated history of frequent infections and sepsis. Admitted 8/12/19-8/16/19 for septic shock related to UTI, discharged on 7 day course of IV Levaquin which he completed mid August. Had fever of 104 9/13 which prompted an observation admission, respiratory virus panel was negative at that time and no source of fever found. Discharged on PO levaquin on 9/15/19. In the past 24 hours, he was a little more tired, did not go to his program, but otherwise was no different. Mother checked his temperature and saw temp of 103 at home and called EMS. He then vomited and likely aspirated per his mother. She has not noticed any other new or worrisome symptoms. Lactic acid 2.5 (trended to 1.5 after IVF), WBC 10.1, procalcitonin 0.2. UA bland. CXR with increasing patchy opacity at right lung. ED noted fever of 105.3. Sepsis criteria met with temp and HR >100  - Admit inpatient  - ID consult  - C diff ordered in ED due to loose stools, but per mother his stools are at  his baseline (loose due to tube feeds)  - Continue zosyn for presumptive aspiration pneumonia--doubt we can get a good sputum sample from him  - blood cultures one out of 2 postive for gram postive cocci in clusters which turned out to be staph hominis  most likely contaminant. Zosyn should cover it   Repeat blood cultures done on 9/18 remain negative   CT chest, abdomen , pelvis done by ID showed extensive lung infiltrates otherwise negative \  ID consulted and are following   - Trend fevers.fevers coming down since admission   Stopped  IVF on 9/18    Afebrile   Much improved   Will discharge with augmentin via feeding tube   Aspiration precautions at all times with elevation of head of bed disccused with mom Savannah today   Respiratory panel returned negative      Hyponatremia  Has hx mild hyponatremia, was 136 on 9/15. Received IVF resuscitation in the ED.   Sodium improved from 128 to 133     Thrombocytopenia, improved  Noted during 8/2019 during admission for sepsis. Improved from 9/15 to today with 116--153-121 today   Stable         Seizure disorder  PTA Tegretol and Briviact, continue     Panhypopituitarism  PTA hydrocortisone 20 mg in morning and 10 mg at night, continued     Hypothyroidism  PTA Synthroid, continued     Gonadotropin, hypogonadism.   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 Not interested in placement. Will not consult PT/OT for patient     History of enlarging pancreatic tail cyst  History of necrotizing pancreatitis requiring cystogastrostomy with a stent placement and subsequent removal.  During one of his recent hospitalizations, a CT was noted with enlarging cyst.  This was evaluated by Dr. Davis from GI during last hospitalization, was suggested a followup CT scan in 6 months (5/2019)      DVT  Prophylaxis: Pneumatic Compression Devices  Code Status: Full Code  Expected discharge: home today      Starr Champion MD  [phone] (P)                 Code Status:      Full Code        Brief Hospital Stay Summary Sent Home With Patient in AVS:        Reason for your hospital stay      Aspiration Pneumonia                 Important Results:      See below         Pending Results:        Unresulted Labs Ordered in the Past 30 Days of this Admission     Date and Time Order Name Status Description    9/18/2019 0838 Blood culture Preliminary     9/18/2019 0838 Blood culture Preliminary     9/16/2019 1636 Blood culture Preliminary             Discharge Instructions and Follow-Up:      Follow-up Appointments     Follow-up and recommended labs and tests       Follow up with primary care provider, Carlos Gomez, within 7 days for   hospital follow- up.  No follow up labs or test are needed.  Dr Gomez does not have available appointment times.  Your follow up   appointment has been scheduled with Dr Lorena Menendez on Monday 9/23/19 at   2:00PM               Discharge Disposition:      Discharged to home        Discharge Medications:        Current Discharge Medication List      START taking these medications    Details   amoxicillin-clavulanate (AUGMENTIN) 875-125 MG tablet 1 tablet by Per Feeding Tube route 2 times daily for 7 days  Qty: 14 tablet, Refills: 0    Associated Diagnoses: Pneumonia of both lower lobes due to infectious organism (H)         CONTINUE these medications which have NOT CHANGED    Details   acetylcysteine (MUCOMYST) 20 % neb solution Take 2 mLs by nebulization 4 times daily  Qty: 300 vial, Refills: 0    Associated Diagnoses: Pneumonia of both lower lobes due to infectious organism (H)      albuterol (PROVENTIL) (5 MG/ML) 0.5% neb solution Take 2.5 mg by nebulization every 4 hours (while awake) 0700 1100 1500 1900 with mucomyst       aspirin (ASA) 81 MG chewable tablet 81 mg by Oral or Feeding Tube  route daily At 0900      bacitracin ointment Apply topically daily as needed for wound care To PEG site.       Bioflavonoid Products (VITAMIN C PLUS) 1000 MG TABS 1 tablet by Oral or FT or NG tube route      Brivaracetam (BRIVIACT) 10 MG/ML solution 100 mg by Oral or Feeding Tube route 2 times daily 0900, 2100      calcium carbonate 1250 (500 CA) MG/5ML SUSP suspension 5 mLs (1,250 mg) by Per J Tube route 3 times daily (with meals)  Qty: 450 mL    Associated Diagnoses: Malnutrition (H)      carBAMazepine (TEGRETOL) 100 MG/5ML suspension 150 mg by Oral or Feeding Tube route every 6 hours At 06:00, 12:00, 18:00 and 24:00 for seizures       ferrous sulfate 220 (44 Fe) MG/5ML ELIX 220 mg by Per Feeding Tube route daily      Guar Gum (FIBER MODULAR, NUTRISOURCE FIBER,) packet 1 packet by Per G Tube route daily  Qty: 30 packet, Refills: 0    Associated Diagnoses: Pneumonia of both lower lobes due to infectious organism (H)      !! hydrocortisone (CORTEF) 5 MG tablet 10 mg by Oral or FT or NG tube route daily (with dinner) At 1500      !! hydrocortisone (CORTEF) 5 MG tablet 20 mg by Oral or FT or NG tube route every morning       hydrocortisone 1 % CREA cream Place rectally 2 times daily as needed for other Apply to reddened memo areas as needed      ipratropium - albuterol 0.5 mg/2.5 mg/3 mL (DUONEB) 0.5-2.5 (3) MG/3ML neb solution Take 1 vial by nebulization every 4 hours as needed (bronchospasms or cough)      levothyroxine (SYNTHROID/LEVOTHROID) 150 MCG tablet Take 150 mcg by mouth every morning      melatonin (MELATONIN) 1 MG/ML LIQD liquid 6 mg by Per NG tube route At Bedtime       metoclopramide (REGLAN) 5 MG/5ML solution 5 mg by Per Feeding Tube route 2 times daily      miconazole (MICATIN) 2 % AERP powder Apply topically 2 times daily as needed       mupirocin (BACTROBAN) 2 % external ointment Apply topically 2 times daily as needed       pantoprazole (PROTONIX) 2 mg/mL SUSP suspension 20 mLs (40 mg) by Per J Tube  route daily  Qty: 400 mL, Refills: 1    Associated Diagnoses: Gastroesophageal reflux disease, esophagitis presence not specified      potassium & sodium phosphates (NEUTRA-PHOS) 280-160-250 MG Packet Take 1 packet by mouth 3 times daily 0900, 1500, 2100.       vitamin D3 (CHOLECALCIFEROL) 2000 units (50 mcg) tablet Take 2,000 Units by mouth daily Crush and feed via j-tube @@ 0900      order for DME Equipment being ordered: Nebulizer  Qty: 1 Units, Refills: 0    Associated Diagnoses: Pneumonia of both lower lobes due to infectious organism (H)      Skin Protectants, Misc. (BALMEX SKIN PROTECTANT) OINT Externally apply topically 2 times daily as needed (irritation) Applay to reddened memo areas twice daily as needed      testosterone cypionate (DEPOTESTOTERONE CYPIONATE) 200 MG/ML injection Inject 76 mg into the muscle See Admin Instructions Every 2 weeks on Fridays   76 mg or 0.38 mL       !! - Potential duplicate medications found. Please discuss with provider.      STOP taking these medications       levofloxacin (LEVAQUIN) 500 MG tablet Comments:   Reason for Stopping:                 Allergies:         Allergies   Allergen Reactions     Dilantin [Phenytoin Sodium]      Valproic Acid      Toxicity c bone marrow suspension, elevated ammonia levels            Consultations This Hospital Stay:      Consultation during this admission received from infectious disease        Condition and Physical on Discharge:      Discharge condition: Stable   Vitals: Blood pressure 90/54, pulse 78, temperature 97.6  F (36.4  C), temperature source Axillary, resp. rate 16, height 1.829 m (6'), weight 69.9 kg (154 lb 1.6 oz), SpO2 95 %.     Constitutional: alert and awake    Lungs: CTA   Cardiovascular: RRR   Abdomen: Soft, NT, ND, BS+   Skin: Warm and dry    Other:          Discharge Time:      Greater than 30 minutes.        Image Results From This Hospital Stay (For Non-EPIC Providers):        Results for orders placed or performed  during the hospital encounter of 09/16/19   XR Chest Port 1 View    Narrative    CHEST ONE VIEW PORTABLE   9/16/2019 6:58 PM     HISTORY: Fever, history aspiration pneumonia    COMPARISON: 9/13/2018 chest x-ray      Impression    IMPRESSION: Increasing patchy opacification right lung consistent with  worsening pneumonia. Left lung grossly clear. Heart and pulmonary  vessels within normal limits.    LAWRENCE CATES MD   CT Chest/Abdomen/Pelvis w Contrast    Narrative    CT CHEST, ABDOMEN, AND PELVIS WITH CONTRAST 9/17/2019 5:43 PM     HISTORY: Sepsis.    COMPARISON: May 6, 2019    TECHNIQUE: Volumetric helical acquisition of CT images from the lung  apices through the symphysis pubis after the administration of 78mL  Isovue-370 intravenous contrast. Radiation dose for this scan was  reduced using automated exposure control, adjustment of the mA and/or  kV according to patient size, or iterative reconstruction technique.    FINDINGS: Moderately extensive bilateral pulmonary infiltrates right  worse than left. Trace pleural fluid on the right. No left pleural or  pericardial effusion. Normal caliber aorta. Normal heart size.    Abdomen and pelvis: Cystic lesion in the pancreas is stable at 3.8 cm  long axis. Adrenal glands, kidneys, spleen, and liver demonstrate no  worrisome focal lesion. Stable liver cyst. GJ tube with tip in the  jejunum noted. There are no dilated loops of small intestine or large  bowel to suggest ileus or obstruction. No free fluid. No free air.  There are no lymph nodes that are abnormal by size criteria. No  hydronephrosis. No urolithiasis. No intra-abdominal abscess. There are  mild atherosclerotic changes of the visualized aorta and its branches.  There is no evidence of aortic dissection or aneurysm.    Survey of the visualized bony structures demonstrates no destructive  bony lesions.      Impression    IMPRESSION: Moderately extensive pulmonary infiltrates right worse  than left. Minimal  right pleural effusion.    BERT ESPINOZA MD             Most Recent Lab Results In EPIC (For Non-EPIC Providers):    Most Recent 3 CBC's:  Recent Labs   Lab Test 09/18/19  0908 09/17/19  0836 09/16/19  1713   WBC 6.7 7.7 10.1   HGB 10.1* 9.7* 12.8*   MCV 86 86 86    121* 153      Most Recent 3 BMP's:  Recent Labs   Lab Test 09/18/19  0908 09/17/19  0836 09/16/19  1713    132* 128*   POTASSIUM 3.6 3.8 4.2   CHLORIDE 102 102 93*   CO2 24 22 29   BUN 10 12 14   CR 0.89 0.85 0.85   ANIONGAP 7 8 6   STEVE 8.1* 7.5* 8.7   * 87 114*     Most Recent 3 Troponin's:  Recent Labs   Lab Test 01/22/17  0500 01/21/17  2348 01/21/17  1600   TROPI 0.017 0.025 0.028     Most Recent 3 INR's:  Recent Labs   Lab Test 02/07/17  0452 01/30/17  0340 01/25/17  0426   INR 1.27* 1.32* 1.49*     Most Recent 2 LFT's:  Recent Labs   Lab Test 09/16/19  1713 09/13/19  2009   AST 25 19   ALT 28 30   ALKPHOS 91 113   BILITOTAL 0.3 0.2     Most Recent Cholesterol Panel:  Recent Labs   Lab Test 11/29/16  0430   TRIG 100     Most Recent 6 Bacteria Isolates From Any Culture (See EPIC Reports for Culture Details):  Recent Labs   Lab Test 09/18/19  0911 09/16/19  1714 09/16/19  1649 09/13/19  2124 09/13/19 2009 08/12/19  2300   CULT No growth after 2 days  No growth after 2 days Cultured on the 2nd day of incubation:  Staphylococcus hominis  *  Critical Value/Significant Value, preliminary result only, called to and read back by  Torito Chou RN 9/18/19 @ 8823 TF    (Note)  POSITIVE for Staphylococci other than S.aureus, S.epidermidis and  S.lugdunensis, by [employer] multiplex nucleic acid test.  Coagulase-negative staphylococci are the most common venipuncture or  collection associated skin CONTAMINANTS grown in blood cultures.  Final identification and antimicrobial susceptibility testing will be  verified by standard methods.    Specimen tested with [employer] multiplex, gram-positive blood culture  nucleic acid test for the  following targets: Staph aureus, Staph  epidermidis, Staph lugdunensis, other Staph species, Enterococcus  faecalis, Enterococcus faecium, Streptococcus species, S. agalactiae,  S. anginosus grp., S. pneumoniae, S. pyogenes, Listeria sp., mecA  (methicillin resistance) and Alberto/B (vancomycin resistance).    Critical Value/Significant Value called to and read back by MARI QUINTANA RN @0815 9/18/19. CT    No growth after 4 days No growth No growth No growth Methicillin resistant Staphylococcus aureus (MRSA) isolated*     Most Recent TSH, T4 and HgbA1c:   Recent Labs   Lab Test 08/12/19  2200  07/05/18  0021   TSH  --   --  <0.01*   T4  --   --  1.66*   A1C 6.1*   < >  --     < > = values in this interval not displayed.

## 2019-09-20 NOTE — PROGRESS NOTES
PCS form has been faxed to [facility].  Discharge orders have been faxed to [facility]( [phone]) and [facility] ([phone]).  Pt is ready for discharge.

## 2019-09-20 NOTE — PLAN OF CARE
Discharge    Patient discharged to home on a stretcher with HE  Care plan note  IV removed. Clothing and shoes on. Peg clamped. Contacted mom to update her on discharge meds and AVS. Questions answered. Sent home all supplies including neb device from the room.    Listed belongings gathered and returned to patient. yes  Care Plan and Patient education resolved: {yes  Prescriptions if needed, hard copies sent with patient  yes  Home and hospital acquired medications returned to patient: yes  Medication Bin checked and emptied on discharge yes  Follow up appointment made for patient: yes

## 2019-09-20 NOTE — PLAN OF CARE
DATE & TIME: 9/19-20/2019 overnight    Cognitive Concerns/ Orientation : Non verbal, alert, unable to assess orientation   BEHAVIOR & AGGRESSION TOOL COLOR: green   ABNL VS/O2: VSS on RA  MOBILITY: chair fast, lift team Ax2  PAIN MANAGMENT: Repositioning, tylenol available  DIET: NPO with tube feeding in GJ tube  BOWEL/BLADDER: Incontinent  ABNL LAB/BG: n/a  DRAIN/DEVICES: GJ tube, L PIV SL  TELEMETRY RHYTHM: n/a  SKIN: redness blanchable on elbow, groin and buttocks, denuded around perineal area  TESTS/PROCEDURES: n/a  D/C DAY/GOALS/PLACE: pending  OTHER IMPORTANT INFO: Wound care, turn/repo q2h. Flush GJ Q4H.

## 2019-09-21 ENCOUNTER — APPOINTMENT (OUTPATIENT)
Dept: GENERAL RADIOLOGY | Facility: CLINIC | Age: 59
DRG: 871 | End: 2019-09-21
Attending: EMERGENCY MEDICINE
Payer: MEDICARE

## 2019-09-21 ENCOUNTER — HOSPITAL ENCOUNTER (INPATIENT)
Facility: CLINIC | Age: 59
LOS: 3 days | Discharge: HOME HEALTH | DRG: 871 | End: 2019-09-24
Attending: EMERGENCY MEDICINE | Admitting: INTERNAL MEDICINE
Payer: MEDICARE

## 2019-09-21 DIAGNOSIS — J69.0 ASPIRATION PNEUMONIA OF BOTH LOWER LOBES DUE TO GASTRIC SECRETIONS (H): Primary | ICD-10-CM

## 2019-09-21 DIAGNOSIS — A41.9 SEPSIS, DUE TO UNSPECIFIED ORGANISM: ICD-10-CM

## 2019-09-21 LAB
ALBUMIN SERPL-MCNC: 2.9 G/DL (ref 3.4–5)
ALBUMIN UR-MCNC: 10 MG/DL
ALP SERPL-CCNC: 78 U/L (ref 40–150)
ALT SERPL W P-5'-P-CCNC: 70 U/L (ref 0–70)
ANION GAP SERPL CALCULATED.3IONS-SCNC: 6 MMOL/L (ref 3–14)
APPEARANCE UR: CLEAR
AST SERPL W P-5'-P-CCNC: 70 U/L (ref 0–45)
BASOPHILS # BLD AUTO: 0.1 10E9/L (ref 0–0.2)
BASOPHILS NFR BLD AUTO: 0.4 %
BILIRUB SERPL-MCNC: 0.3 MG/DL (ref 0.2–1.3)
BILIRUB UR QL STRIP: NEGATIVE
BUN SERPL-MCNC: 21 MG/DL (ref 7–30)
CALCIUM SERPL-MCNC: 8.7 MG/DL (ref 8.5–10.1)
CHLORIDE SERPL-SCNC: 99 MMOL/L (ref 94–109)
CO2 BLDCOV-SCNC: 30 MMOL/L (ref 21–28)
CO2 SERPL-SCNC: 27 MMOL/L (ref 20–32)
COLOR UR AUTO: YELLOW
CREAT SERPL-MCNC: 0.72 MG/DL (ref 0.66–1.25)
DIFFERENTIAL METHOD BLD: ABNORMAL
EOSINOPHIL # BLD AUTO: 0.2 10E9/L (ref 0–0.7)
EOSINOPHIL NFR BLD AUTO: 1.7 %
ERYTHROCYTE [DISTWIDTH] IN BLOOD BY AUTOMATED COUNT: 13.9 % (ref 10–15)
GFR SERPL CREATININE-BSD FRML MDRD: >90 ML/MIN/{1.73_M2}
GLUCOSE SERPL-MCNC: 97 MG/DL (ref 70–99)
GLUCOSE UR STRIP-MCNC: NEGATIVE MG/DL
HCT VFR BLD AUTO: 35.7 % (ref 40–53)
HGB BLD-MCNC: 11.8 G/DL (ref 13.3–17.7)
HGB UR QL STRIP: NEGATIVE
IMM GRANULOCYTES # BLD: 0.1 10E9/L (ref 0–0.4)
IMM GRANULOCYTES NFR BLD: 0.4 %
KETONES UR STRIP-MCNC: NEGATIVE MG/DL
LACTATE BLD-SCNC: 2.1 MMOL/L (ref 0.7–2.1)
LEUKOCYTE ESTERASE UR QL STRIP: NEGATIVE
LYMPHOCYTES # BLD AUTO: 2 10E9/L (ref 0.8–5.3)
LYMPHOCYTES NFR BLD AUTO: 16.2 %
MCH RBC QN AUTO: 28.2 PG (ref 26.5–33)
MCHC RBC AUTO-ENTMCNC: 33.1 G/DL (ref 31.5–36.5)
MCV RBC AUTO: 85 FL (ref 78–100)
MONOCYTES # BLD AUTO: 0.8 10E9/L (ref 0–1.3)
MONOCYTES NFR BLD AUTO: 7 %
NEUTROPHILS # BLD AUTO: 9 10E9/L (ref 1.6–8.3)
NEUTROPHILS NFR BLD AUTO: 74.3 %
NITRATE UR QL: NEGATIVE
NRBC # BLD AUTO: 0 10*3/UL
NRBC BLD AUTO-RTO: 0 /100
PCO2 BLDV: 42 MM HG (ref 40–50)
PH BLDV: 7.46 PH (ref 7.32–7.43)
PH UR STRIP: 7 PH (ref 5–7)
PLATELET # BLD AUTO: 278 10E9/L (ref 150–450)
PO2 BLDV: 28 MM HG (ref 25–47)
POTASSIUM SERPL-SCNC: 4.3 MMOL/L (ref 3.4–5.3)
PROT SERPL-MCNC: 8.1 G/DL (ref 6.8–8.8)
RBC # BLD AUTO: 4.18 10E12/L (ref 4.4–5.9)
RBC #/AREA URNS AUTO: <1 /HPF (ref 0–2)
SAO2 % BLDV FROM PO2: 57 %
SODIUM SERPL-SCNC: 132 MMOL/L (ref 133–144)
SOURCE: ABNORMAL
SP GR UR STRIP: 1.03 (ref 1–1.03)
UROBILINOGEN UR STRIP-MCNC: 2 MG/DL (ref 0–2)
WBC # BLD AUTO: 12.1 10E9/L (ref 4–11)
WBC #/AREA URNS AUTO: 5 /HPF (ref 0–5)

## 2019-09-21 PROCEDURE — 87040 BLOOD CULTURE FOR BACTERIA: CPT | Performed by: EMERGENCY MEDICINE

## 2019-09-21 PROCEDURE — 85025 COMPLETE CBC W/AUTO DIFF WBC: CPT | Performed by: EMERGENCY MEDICINE

## 2019-09-21 PROCEDURE — 25000128 H RX IP 250 OP 636: Performed by: EMERGENCY MEDICINE

## 2019-09-21 PROCEDURE — 99223 1ST HOSP IP/OBS HIGH 75: CPT | Mod: AI | Performed by: INTERNAL MEDICINE

## 2019-09-21 PROCEDURE — 80053 COMPREHEN METABOLIC PANEL: CPT | Performed by: EMERGENCY MEDICINE

## 2019-09-21 PROCEDURE — 12000000 ZZH R&B MED SURG/OB

## 2019-09-21 PROCEDURE — 96361 HYDRATE IV INFUSION ADD-ON: CPT

## 2019-09-21 PROCEDURE — 25800030 ZZH RX IP 258 OP 636: Performed by: EMERGENCY MEDICINE

## 2019-09-21 PROCEDURE — 99285 EMERGENCY DEPT VISIT HI MDM: CPT | Mod: 25

## 2019-09-21 PROCEDURE — 96365 THER/PROPH/DIAG IV INF INIT: CPT

## 2019-09-21 PROCEDURE — 83605 ASSAY OF LACTIC ACID: CPT

## 2019-09-21 PROCEDURE — 96367 TX/PROPH/DG ADDL SEQ IV INF: CPT

## 2019-09-21 PROCEDURE — 82803 BLOOD GASES ANY COMBINATION: CPT

## 2019-09-21 PROCEDURE — 81001 URINALYSIS AUTO W/SCOPE: CPT | Performed by: EMERGENCY MEDICINE

## 2019-09-21 PROCEDURE — 71046 X-RAY EXAM CHEST 2 VIEWS: CPT

## 2019-09-21 RX ORDER — ONDANSETRON 2 MG/ML
4 INJECTION INTRAMUSCULAR; INTRAVENOUS EVERY 6 HOURS PRN
Status: DISCONTINUED | OUTPATIENT
Start: 2019-09-21 | End: 2019-09-24 | Stop reason: HOSPADM

## 2019-09-21 RX ORDER — ACETYLCYSTEINE 200 MG/ML
2 SOLUTION ORAL; RESPIRATORY (INHALATION) 4 TIMES DAILY
COMMUNITY
End: 2022-12-01

## 2019-09-21 RX ORDER — BACITRACIN ZINC 500 [USP'U]/G
OINTMENT TOPICAL DAILY PRN
Status: DISCONTINUED | OUTPATIENT
Start: 2019-09-21 | End: 2019-09-24 | Stop reason: HOSPADM

## 2019-09-21 RX ORDER — HYDROCORTISONE 20 MG/1
20 TABLET ORAL EVERY MORNING
Status: DISCONTINUED | OUTPATIENT
Start: 2019-09-22 | End: 2019-09-24 | Stop reason: HOSPADM

## 2019-09-21 RX ORDER — PIPERACILLIN SODIUM, TAZOBACTAM SODIUM 3; .375 G/15ML; G/15ML
3.38 INJECTION, POWDER, LYOPHILIZED, FOR SOLUTION INTRAVENOUS ONCE
Status: COMPLETED | OUTPATIENT
Start: 2019-09-21 | End: 2019-09-21

## 2019-09-21 RX ORDER — LEVOTHYROXINE SODIUM 150 UG/1
150 TABLET ORAL
Status: DISCONTINUED | OUTPATIENT
Start: 2019-09-22 | End: 2019-09-24

## 2019-09-21 RX ORDER — NALOXONE HYDROCHLORIDE 0.4 MG/ML
.1-.4 INJECTION, SOLUTION INTRAMUSCULAR; INTRAVENOUS; SUBCUTANEOUS
Status: DISCONTINUED | OUTPATIENT
Start: 2019-09-21 | End: 2019-09-24 | Stop reason: HOSPADM

## 2019-09-21 RX ORDER — ASPIRIN 81 MG/1
81 TABLET, CHEWABLE ORAL DAILY
Status: DISCONTINUED | OUTPATIENT
Start: 2019-09-22 | End: 2019-09-24 | Stop reason: HOSPADM

## 2019-09-21 RX ORDER — ONDANSETRON 4 MG/1
4 TABLET, ORALLY DISINTEGRATING ORAL EVERY 6 HOURS PRN
Status: DISCONTINUED | OUTPATIENT
Start: 2019-09-21 | End: 2019-09-24 | Stop reason: HOSPADM

## 2019-09-21 RX ORDER — ACETAMINOPHEN 650 MG/1
650 SUPPOSITORY RECTAL EVERY 4 HOURS PRN
Status: DISCONTINUED | OUTPATIENT
Start: 2019-09-21 | End: 2019-09-24 | Stop reason: HOSPADM

## 2019-09-21 RX ORDER — LIDOCAINE 40 MG/G
CREAM TOPICAL
Status: DISCONTINUED | OUTPATIENT
Start: 2019-09-21 | End: 2019-09-24 | Stop reason: HOSPADM

## 2019-09-21 RX ORDER — METOCLOPRAMIDE HYDROCHLORIDE 5 MG/5ML
5 SOLUTION ORAL 2 TIMES DAILY
Status: DISCONTINUED | OUTPATIENT
Start: 2019-09-21 | End: 2019-09-24 | Stop reason: HOSPADM

## 2019-09-21 RX ORDER — ACETAMINOPHEN 325 MG/1
650 TABLET ORAL EVERY 4 HOURS PRN
Status: DISCONTINUED | OUTPATIENT
Start: 2019-09-21 | End: 2019-09-24 | Stop reason: HOSPADM

## 2019-09-21 RX ORDER — CARBAMAZEPINE 100 MG/5ML
150 SUSPENSION ORAL EVERY 6 HOURS
Status: DISCONTINUED | OUTPATIENT
Start: 2019-09-21 | End: 2019-09-24 | Stop reason: HOSPADM

## 2019-09-21 RX ORDER — CHOLECALCIFEROL (VITAMIN D3) 50 MCG
2000 TABLET ORAL DAILY
Status: DISCONTINUED | OUTPATIENT
Start: 2019-09-22 | End: 2019-09-24

## 2019-09-21 RX ORDER — ALBUTEROL SULFATE 5 MG/ML
2.5 SOLUTION RESPIRATORY (INHALATION)
Status: DISCONTINUED | OUTPATIENT
Start: 2019-09-21 | End: 2019-09-24 | Stop reason: HOSPADM

## 2019-09-21 RX ORDER — SODIUM CHLORIDE 9 MG/ML
INJECTION, SOLUTION INTRAVENOUS CONTINUOUS
Status: DISCONTINUED | OUTPATIENT
Start: 2019-09-21 | End: 2019-09-23

## 2019-09-21 RX ORDER — PIPERACILLIN SODIUM, TAZOBACTAM SODIUM 3; .375 G/15ML; G/15ML
3.38 INJECTION, POWDER, LYOPHILIZED, FOR SOLUTION INTRAVENOUS EVERY 6 HOURS
Status: DISCONTINUED | OUTPATIENT
Start: 2019-09-22 | End: 2019-09-24 | Stop reason: HOSPADM

## 2019-09-21 RX ORDER — HYDROCORTISONE 10 MG/1
10 TABLET ORAL
Status: DISCONTINUED | OUTPATIENT
Start: 2019-09-22 | End: 2019-09-24 | Stop reason: HOSPADM

## 2019-09-21 RX ORDER — ACETYLCYSTEINE 200 MG/ML
2 SOLUTION ORAL; RESPIRATORY (INHALATION) EVERY 6 HOURS
Status: DISCONTINUED | OUTPATIENT
Start: 2019-09-21 | End: 2019-09-24 | Stop reason: HOSPADM

## 2019-09-21 RX ORDER — LANOLIN ALCOHOL/MO/W.PET/CERES
6 CREAM (GRAM) TOPICAL AT BEDTIME
Status: DISCONTINUED | OUTPATIENT
Start: 2019-09-21 | End: 2019-09-24 | Stop reason: HOSPADM

## 2019-09-21 RX ORDER — IPRATROPIUM BROMIDE AND ALBUTEROL SULFATE 2.5; .5 MG/3ML; MG/3ML
1 SOLUTION RESPIRATORY (INHALATION) EVERY 4 HOURS PRN
Status: DISCONTINUED | OUTPATIENT
Start: 2019-09-21 | End: 2019-09-24 | Stop reason: HOSPADM

## 2019-09-21 RX ADMIN — VANCOMYCIN HYDROCHLORIDE 1500 MG: 5 INJECTION, POWDER, LYOPHILIZED, FOR SOLUTION INTRAVENOUS at 22:08

## 2019-09-21 RX ADMIN — SODIUM CHLORIDE 1000 ML: 9 INJECTION, SOLUTION INTRAVENOUS at 20:40

## 2019-09-21 RX ADMIN — PIPERACILLIN SODIUM,TAZOBACTAM SODIUM 3.38 G: 3; .375 INJECTION, POWDER, FOR SOLUTION INTRAVENOUS at 21:22

## 2019-09-22 LAB
ANION GAP SERPL CALCULATED.3IONS-SCNC: 3 MMOL/L (ref 3–14)
BACTERIA SPEC CULT: NO GROWTH
BUN SERPL-MCNC: 17 MG/DL (ref 7–30)
CALCIUM SERPL-MCNC: 7.6 MG/DL (ref 8.5–10.1)
CHLORIDE SERPL-SCNC: 105 MMOL/L (ref 94–109)
CO2 SERPL-SCNC: 27 MMOL/L (ref 20–32)
CREAT SERPL-MCNC: 0.77 MG/DL (ref 0.66–1.25)
ERYTHROCYTE [DISTWIDTH] IN BLOOD BY AUTOMATED COUNT: 14 % (ref 10–15)
GFR SERPL CREATININE-BSD FRML MDRD: >90 ML/MIN/{1.73_M2}
GLUCOSE BLDC GLUCOMTR-MCNC: 101 MG/DL (ref 70–99)
GLUCOSE BLDC GLUCOMTR-MCNC: 89 MG/DL (ref 70–99)
GLUCOSE SERPL-MCNC: 83 MG/DL (ref 70–99)
HCT VFR BLD AUTO: 31.8 % (ref 40–53)
HGB BLD-MCNC: 10.5 G/DL (ref 13.3–17.7)
LACTATE BLD-SCNC: 1.5 MMOL/L (ref 0.7–2)
Lab: NORMAL
MCH RBC QN AUTO: 28.4 PG (ref 26.5–33)
MCHC RBC AUTO-ENTMCNC: 33 G/DL (ref 31.5–36.5)
MCV RBC AUTO: 86 FL (ref 78–100)
MRSA DNA SPEC QL NAA+PROBE: POSITIVE
PLATELET # BLD AUTO: 240 10E9/L (ref 150–450)
POTASSIUM SERPL-SCNC: 4 MMOL/L (ref 3.4–5.3)
RBC # BLD AUTO: 3.7 10E12/L (ref 4.4–5.9)
SODIUM SERPL-SCNC: 135 MMOL/L (ref 133–144)
SPECIMEN SOURCE: ABNORMAL
SPECIMEN SOURCE: NORMAL
WBC # BLD AUTO: 11.1 10E9/L (ref 4–11)

## 2019-09-22 PROCEDURE — 25800030 ZZH RX IP 258 OP 636: Performed by: INTERNAL MEDICINE

## 2019-09-22 PROCEDURE — 36415 COLL VENOUS BLD VENIPUNCTURE: CPT | Performed by: INTERNAL MEDICINE

## 2019-09-22 PROCEDURE — 87641 MR-STAPH DNA AMP PROBE: CPT | Performed by: INTERNAL MEDICINE

## 2019-09-22 PROCEDURE — 87640 STAPH A DNA AMP PROBE: CPT | Performed by: INTERNAL MEDICINE

## 2019-09-22 PROCEDURE — 80048 BASIC METABOLIC PNL TOTAL CA: CPT | Performed by: INTERNAL MEDICINE

## 2019-09-22 PROCEDURE — 27210429 ZZH NUTRITION PRODUCT INTERMEDIATE LITER

## 2019-09-22 PROCEDURE — 94640 AIRWAY INHALATION TREATMENT: CPT | Mod: 76

## 2019-09-22 PROCEDURE — 25000128 H RX IP 250 OP 636: Performed by: INTERNAL MEDICINE

## 2019-09-22 PROCEDURE — 12000000 ZZH R&B MED SURG/OB

## 2019-09-22 PROCEDURE — 25000132 ZZH RX MED GY IP 250 OP 250 PS 637: Mod: GY | Performed by: INTERNAL MEDICINE

## 2019-09-22 PROCEDURE — 25000125 ZZHC RX 250: Performed by: INTERNAL MEDICINE

## 2019-09-22 PROCEDURE — 40000275 ZZH STATISTIC RCP TIME EA 10 MIN

## 2019-09-22 PROCEDURE — 83605 ASSAY OF LACTIC ACID: CPT | Performed by: INTERNAL MEDICINE

## 2019-09-22 PROCEDURE — 99232 SBSQ HOSP IP/OBS MODERATE 35: CPT | Performed by: INTERNAL MEDICINE

## 2019-09-22 PROCEDURE — 00000146 ZZHCL STATISTIC GLUCOSE BY METER IP

## 2019-09-22 PROCEDURE — 85027 COMPLETE CBC AUTOMATED: CPT | Performed by: INTERNAL MEDICINE

## 2019-09-22 PROCEDURE — 25000132 ZZH RX MED GY IP 250 OP 250 PS 637: Mod: GY

## 2019-09-22 PROCEDURE — 94640 AIRWAY INHALATION TREATMENT: CPT

## 2019-09-22 RX ORDER — GUAR GUM
1 PACKET (EA) ORAL DAILY
Status: DISCONTINUED | OUTPATIENT
Start: 2019-09-22 | End: 2019-09-24 | Stop reason: HOSPADM

## 2019-09-22 RX ORDER — AMINO AC/PROTEIN HYDR/WHEY PRO 10G-100/30
1 LIQUID (ML) ORAL 2 TIMES DAILY
Status: DISCONTINUED | OUTPATIENT
Start: 2019-09-22 | End: 2019-09-24 | Stop reason: HOSPADM

## 2019-09-22 RX ORDER — MICONAZOLE NITRATE 20 MG/G
CREAM TOPICAL
Status: DISCONTINUED | OUTPATIENT
Start: 2019-09-22 | End: 2019-09-24 | Stop reason: HOSPADM

## 2019-09-22 RX ADMIN — MELATONIN TAB 3 MG 6 MG: 3 TAB at 21:26

## 2019-09-22 RX ADMIN — CARBAMAZEPINE 150 MG: 100 SUSPENSION ORAL at 13:00

## 2019-09-22 RX ADMIN — HYDROCORTISONE 20 MG: 20 TABLET ORAL at 08:19

## 2019-09-22 RX ADMIN — MELATONIN TAB 3 MG 6 MG: 3 TAB at 00:08

## 2019-09-22 RX ADMIN — MINERAL SUPPLEMENT IRON 300 MG / 5 ML STRENGTH LIQUID 100 PER BOX UNFLAVORED 300 MG: at 11:06

## 2019-09-22 RX ADMIN — Medication 1 PACKET: at 11:06

## 2019-09-22 RX ADMIN — METOCLOPRAMIDE 5 MG: 5 SOLUTION ORAL at 08:29

## 2019-09-22 RX ADMIN — BRIVARACETAM 100 MG: 10 SOLUTION ORAL at 21:26

## 2019-09-22 RX ADMIN — PIPERACILLIN SODIUM,TAZOBACTAM SODIUM 3.38 G: 3; .375 INJECTION, POWDER, FOR SOLUTION INTRAVENOUS at 08:20

## 2019-09-22 RX ADMIN — ENOXAPARIN SODIUM 40 MG: 40 INJECTION SUBCUTANEOUS at 13:10

## 2019-09-22 RX ADMIN — ASPIRIN 81 MG 81 MG: 81 TABLET ORAL at 08:19

## 2019-09-22 RX ADMIN — IPRATROPIUM BROMIDE AND ALBUTEROL SULFATE 3 ML: .5; 3 SOLUTION RESPIRATORY (INHALATION) at 08:07

## 2019-09-22 RX ADMIN — ACETYLCYSTEINE 2 ML: 200 SOLUTION ORAL; RESPIRATORY (INHALATION) at 01:06

## 2019-09-22 RX ADMIN — ACETYLCYSTEINE 2 ML: 200 SOLUTION ORAL; RESPIRATORY (INHALATION) at 12:08

## 2019-09-22 RX ADMIN — CARBAMAZEPINE 150 MG: 100 SUSPENSION ORAL at 23:29

## 2019-09-22 RX ADMIN — SODIUM CHLORIDE: 9 INJECTION, SOLUTION INTRAVENOUS at 22:04

## 2019-09-22 RX ADMIN — CARBAMAZEPINE 150 MG: 100 SUSPENSION ORAL at 00:08

## 2019-09-22 RX ADMIN — VANCOMYCIN HYDROCHLORIDE 1500 MG: 5 INJECTION, POWDER, LYOPHILIZED, FOR SOLUTION INTRAVENOUS at 20:02

## 2019-09-22 RX ADMIN — PIPERACILLIN SODIUM,TAZOBACTAM SODIUM 3.38 G: 3; .375 INJECTION, POWDER, FOR SOLUTION INTRAVENOUS at 14:55

## 2019-09-22 RX ADMIN — IPRATROPIUM BROMIDE AND ALBUTEROL SULFATE 3 ML: .5; 3 SOLUTION RESPIRATORY (INHALATION) at 15:51

## 2019-09-22 RX ADMIN — SODIUM CHLORIDE: 9 INJECTION, SOLUTION INTRAVENOUS at 01:44

## 2019-09-22 RX ADMIN — LEVOTHYROXINE SODIUM 150 MCG: 150 TABLET ORAL at 08:19

## 2019-09-22 RX ADMIN — POTASSIUM & SODIUM PHOSPHATES POWDER PACK 280-160-250 MG 1 PACKET: 280-160-250 PACK at 08:19

## 2019-09-22 RX ADMIN — ACETYLCYSTEINE 2 ML: 200 SOLUTION ORAL; RESPIRATORY (INHALATION) at 08:07

## 2019-09-22 RX ADMIN — CARBAMAZEPINE 150 MG: 100 SUSPENSION ORAL at 17:10

## 2019-09-22 RX ADMIN — PIPERACILLIN SODIUM,TAZOBACTAM SODIUM 3.38 G: 3; .375 INJECTION, POWDER, FOR SOLUTION INTRAVENOUS at 21:56

## 2019-09-22 RX ADMIN — POTASSIUM & SODIUM PHOSPHATES POWDER PACK 280-160-250 MG 1 PACKET: 280-160-250 PACK at 21:26

## 2019-09-22 RX ADMIN — POTASSIUM & SODIUM PHOSPHATES POWDER PACK 280-160-250 MG 1 PACKET: 280-160-250 PACK at 17:10

## 2019-09-22 RX ADMIN — ALBUTEROL SULFATE 2.5 MG: 2.5 SOLUTION RESPIRATORY (INHALATION) at 20:36

## 2019-09-22 RX ADMIN — BRIVARACETAM 100 MG: 10 SOLUTION ORAL at 10:27

## 2019-09-22 RX ADMIN — ACETYLCYSTEINE 2 ML: 200 SOLUTION ORAL; RESPIRATORY (INHALATION) at 20:36

## 2019-09-22 RX ADMIN — IPRATROPIUM BROMIDE AND ALBUTEROL SULFATE 3 ML: .5; 3 SOLUTION RESPIRATORY (INHALATION) at 01:06

## 2019-09-22 RX ADMIN — IPRATROPIUM BROMIDE AND ALBUTEROL SULFATE 3 ML: .5; 3 SOLUTION RESPIRATORY (INHALATION) at 12:08

## 2019-09-22 RX ADMIN — HYDROCORTISONE 10 MG: 10 TABLET ORAL at 17:10

## 2019-09-22 RX ADMIN — Medication 40 MG: at 08:28

## 2019-09-22 RX ADMIN — METOCLOPRAMIDE 5 MG: 5 SOLUTION ORAL at 00:08

## 2019-09-22 RX ADMIN — ACETAMINOPHEN 650 MG: 325 TABLET, FILM COATED ORAL at 00:08

## 2019-09-22 RX ADMIN — VANCOMYCIN HYDROCHLORIDE 1500 MG: 5 INJECTION, POWDER, LYOPHILIZED, FOR SOLUTION INTRAVENOUS at 08:27

## 2019-09-22 RX ADMIN — PIPERACILLIN SODIUM,TAZOBACTAM SODIUM 3.38 G: 3; .375 INJECTION, POWDER, FOR SOLUTION INTRAVENOUS at 03:30

## 2019-09-22 RX ADMIN — METOCLOPRAMIDE 5 MG: 5 SOLUTION ORAL at 21:26

## 2019-09-22 RX ADMIN — CARBAMAZEPINE 150 MG: 100 SUSPENSION ORAL at 04:55

## 2019-09-22 RX ADMIN — CHOLECALCIFEROL TAB 50 MCG (2000 UNIT) 2000 UNITS: 50 TAB at 08:20

## 2019-09-22 ASSESSMENT — ACTIVITIES OF DAILY LIVING (ADL)
ADLS_ACUITY_SCORE: 38
ADLS_ACUITY_SCORE: 38
AMBULATION: 3-->ASSISTIVE EQUIPMENT AND PERSON
BATHING: 4-->COMPLETELY DEPENDENT
ADLS_ACUITY_SCORE: 39
DRESS: 4-->COMPLETELY DEPENDENT
TRANSFERRING: 3-->ASSISTIVE EQUIPMENT AND PERSON
TOILETING: 4-->COMPLETELY DEPENDENT
ADLS_ACUITY_SCORE: 38
ADLS_ACUITY_SCORE: 39
ADLS_ACUITY_SCORE: 37
RETIRED_EATING: 4-->COMPLETELY DEPENDENT
RETIRED_COMMUNICATION: 3-->UNABLE TO SPEAK (NOT RELATED TO LANGUAGE BARRIER)
SWALLOWING: 2-->DIFFICULTY SWALLOWING LIQUIDS/FOODS
FALL_HISTORY_WITHIN_LAST_SIX_MONTHS: NO
COGNITION: 2 - DIFFICULTY WITH ORGANIZING THOUGHTS

## 2019-09-22 NOTE — PLAN OF CARE
DATE & TIME: 9/22/19 7p-7am  Cognitive Concerns/ Orientation : Non-Verbal, HECTOR orientation    BEHAVIOR & AGGRESSION TOOL COLOR: Green, calm and cooperative       ABNL VS/O2: T max-100.5, other VSS on RA  MOBILITY: Total Care, turned and re-positioned q2h  PAIN MANAGMENT: No signs of pain/discomfort  DIET: NPO, will start TF today  BOWEL/BLADDER: Incontinent ob both, lg loose BM#1  ABNL LAB/BG:WBC-12.1, lactic 2.1  DRAIN/DEVICES: PIV NS infusing  SKIN: Skin, pale and bruised. Jessica area and buttocks are red and excoriated-cares completed  TESTS/PROCEDURES: UC/BC done/pending  D/C DAY/GOALS/PLACE: Pending progress  OTHER IMPORTANT INFO: Frequent NPC, LS-diminished, turned and repositioned q 2 hrs, continues on Vanco/Zosyn/Nebs.

## 2019-09-22 NOTE — PROGRESS NOTES
Patient is on RA with SpO2 in the mid 90's. BS diminished. All nebs were given as ordered.  Will cont to follow.  9/22/2019  Heather Phillips RRT

## 2019-09-22 NOTE — PROGRESS NOTES
RECEIVING UNIT ED HANDOFF REVIEW    ED Nurse Handoff Report was reviewed by: Felice Landers RN on September 21, 2019 at 10:30 PM

## 2019-09-22 NOTE — CONSULTS
CLINICAL NUTRITION SERVICES  -  ASSESSMENT NOTE      Recommendations Ordered by Registered Dietitian (RD):     Resume EN per home regimen (using formulary equivalent):   - GJT (feed J Port)  - Isosource 1.5 (equivalent to home Jevity 1.5) @ 100 ml/hr x 12 hours (7am-7pm)  - Provides 1800 kcals, 82 gm pro, 18 gm fiber, 912 mL H20, 211 gm CHO  - Add Nutrisource Fiber 1 pkt daily for additional 15 kcal, 3 g fiber  - Add Prosource 1 pkt BID for additional 80 kcal, 22 g protein  Total Provisions: 1900 kcals (27, kcal/kg and 90% energy needs), 104 gm pro (1.5 gm/kg, 100%), 21 g fiber.   - Fluid flushes of 60 ml q 4 hrs while receiving IVF (once off IVF, recommend increase flushes to 150 mL every 4 hrs)     Malnutrition:   % Weight Loss:  Weight loss does not meet criteria for malnutrition (wt fluctuates up/down)  % Intake:  No decreased intake noted  Subcutaneous Fat Loss:  None observed  Muscle Loss:  Temporal region mild depletion (pt with hemiplegia, decreased muscle, no acute loss)  Fluid Retention:  None noted    Malnutrition Diagnosis: Patient does not meet two of the above criteria necessary for diagnosing malnutrition        REASON FOR ASSESSMENT  Keyon Farias is a 59 year old male seen by Registered Dietitian for Admission Nutrition Risk Screen for tube feeding or parenteral nutrition and Provider Order - Registered Dietitian to Assess and Order TF per Medical Nutrition Therapy Protocol      NUTRITION HISTORY  Pt well known to our service (15 admissions this year)  Lives with his mother    Pt just d/c'd 9/20  Full Nutrition Assessment completed 9/17/19  Enteral nutrition since August 2016  - Tube type: GJT  - Enteral Regimen: Jevity 1.5 (5 to 5.5 cans daily) x 12 hours during the day (7am-7pm)  - Provides: 3842-3820 kcal, 76-83g protein, ~27g fiber and ~900mL free water.   - Fluid flush: H2O flushes 120 mL QID.  - Pt lives with his mother, she likes to keep the TF rate at no more than 100 ml/hr. He is fed during  "the day rather than at night so that he can be up in the chair to prevent aspiration    8/7/19: exchange of 18 Welsh TORY gastrojejunostomy tube.      CURRENT NUTRITION ORDERS  Diet Order:     NPO + TF    Will restart TF today      NUTRITION FOCUSED PHYSICAL ASSESSMENT FOR DIAGNOSING MALNUTRITION)  Completed:  Yes Visual assessment only         Observed:    Muscle wasting (refer to documentation in Malnutrition section)    Obtained from Chart/Interdisciplinary Team:  No edema  Coccyx and groin reddened  9/21: large watery/tan BM     Recurrent aspiration pneumonia  TBI/aphasia (non-verbal)  spastic right-sided hemiplegia      ANTHROPOMETRICS  Height: 6'0\"  Weight: (9/18) 69.9 kg / 154#  BMI: 20.9  Weight Status:  Normal BMI  IBW: 80.9 kg  % IBW: 86%  Weight History: Suspect 9/21 wt of 150# is reported.  Wt fluctuates.  Wt Readings from Last 10 Encounters:   09/21/19 68 kg (150 lb)   09/18/19 69.9 kg (154 lb 1.6 oz)   09/13/19 72 kg (158 lb 11.2 oz)   08/15/19 75.4 kg (166 lb 3.6 oz)   08/07/19 72.6 kg (160 lb)   06/19/19 72.4 kg (159 lb 11.2 oz)   05/30/19 74.5 kg (164 lb 3.9 oz)   05/08/19 74.5 kg (164 lb 3.2 oz)   04/08/19 71.8 kg (158 lb 6.4 oz)   04/02/19 72 kg (158 lb 11.7 oz)         LABS  Labs reviewed    MEDICATIONS  Reglan  Neutra-Phos  IVF @ 75 mL/hr      ASSESSED NUTRITION NEEDS PER APPROVED PRACTICE GUIDELINES:    Dosing Weight 69.9 kg  Estimated Energy Needs: 4247-4207 kcals (30-35 Kcal/Kg)  Justification: underweight and spastic hemiplegia  Estimated Protein Needs:  grams protein (1.2-1.5 g pro/Kg)  Justification: preservation of lean body mass  Estimated Fluid Needs: 9563-8602  mL (1 mL/Kcal)  Justification: maintenance    MALNUTRITION:  % Weight Loss:  Weight loss does not meet criteria for malnutrition (wt fluctuates up/down)  % Intake:  No decreased intake noted  Subcutaneous Fat Loss:  None observed  Muscle Loss:  Temporal region mild depletion (pt with hemiplegia, decreased muscle, no acute " loss)  Fluid Retention:  None noted    Malnutrition Diagnosis: Patient does not meet two of the above criteria necessary for diagnosing malnutrition      NUTRITION DIAGNOSIS:  Inadequate protein-energy intake related to NPO status and TF not running as evidenced by pt meeting 0% est needs      NUTRITION INTERVENTIONS  Recommendations / Nutrition Prescription  Resume EN per home regimen (using formulary equivalent):   - GJT (feed J Port)  - Isosource 1.5 (equivalent to home Jevity 1.5) @ 100 ml/hr x 12 hours (7am-7pm)  - Provides 1800 kcals, 82 gm pro, 18 gm fiber, 912 mL H20, 211 gm CHO  - Add Nutrisource Fiber 1 pkt daily for additional 15 kcal, 3 g fiber  - Add Prosource 1 pkt BID for additional 80 kcal, 22 g protein  Total Provisions: 1900 kcals (27, kcal/kg and 90% energy needs), 104 gm pro (1.5 gm/kg, 100%), 21 g fiber.   - Fluid flushes of 60 ml q 4 hrs while receiving IVF (once off IVF, recommend increase flushes to 150 mL every 4 hrs)  .      Implementation  Nutrition education ---> Explained to pt that we will be resuming his TF, he gave me a thumbs up  EN orders entered in EPIC  .      Nutrition Goals  EN to meet % est needs  .      MONITORING AND EVALUATION:  Progress towards goals will be monitored and evaluated per protocol and Practice Guidelines

## 2019-09-22 NOTE — PROGRESS NOTES
"Admission    Patient arrives to room 609 via cart from ED.  Care plan note: Pt is nonVerbal, HECTOR to orientation, alert though. Total cares, Febrile - temp 100.5 axillary. Gtube dressing CDI. Coccyx and groin reddened - Barrier cream applied. Pt arrived to floor - had large watery/tan BM. IV infusing Vancomycin. Pt appears comfortable, when asked if he's in pain - shakes head \"no\".     Inpatient nursing criteria listed below were met:    PCD's Documented: No - but ordered  Skin issues/needs documented :No  Isolation education started/completed Yes  Patient allergies verified with patient: No  Verified completion of [place] Risk Assessment Tool:  No  Verified completion of Guardianship screening tool: No  Fall Prevention: Care plan updated, Education given and documented No  Care Plan initiated: No  Home medications documented in belongings flowsheet: No  Patient belongings documented in belongings flowsheet: No  Reminder note (belongings/ medications) placed in discharge instructions:No  Admission profile/ required documentation complete: No  Bedside Report Letter given and explained to patient No    Unable to finish admission as patient arrived right at shift change. Will pass on to next shift.     "

## 2019-09-22 NOTE — PHARMACY-ADMISSION MEDICATION HISTORY
Admission medication history interview status for the 9/21/2019  admission is complete. See EPIC admission navigator for prior to admission medications     Medication history source reliability:Good    Actions taken by pharmacist (provider contacted, etc): patient was discharged from [facility] on 9/20/2019 (yesterday). Spoke to patient's mother (guardian) and confirmed that there were no major medication changes from patient's most recent discharge report. Confirmed last doses with patient's mother.     Additional medication history information not noted on [facility] med list :None    Medication reconciliation/reorder completed by provider prior to medication history? No    Time spent in this activity: 15 minutes    Prior to Admission medications    Medication Sig Last Dose Taking? Auth Provider   acetylcysteine (MUCOMYST) 20 % neb solution Take 2 mLs by nebulization every 6 hours With albuterol at 0700, 1100, 1500, and 1900 9/21/2019 at x4 Yes Unknown, Entered By History   albuterol (PROVENTIL) (5 MG/ML) 0.5% neb solution Take 2.5 mg by nebulization every 4 hours (while awake) 0700 1100 1500 1900 with mucomyst  9/21/2019 at x4 Yes Unknown, Entered By History   amoxicillin-clavulanate (AUGMENTIN) 875-125 MG tablet Take 1 tablet through feeding tube 2 times daily. Day 1 was 9/20/2019 9/21/2019 at x1 Yes Unknown, Entered By History   aspirin (ASA) 81 MG chewable tablet 81 mg by Oral or Feeding Tube route daily At 0900 9/21/2019 at Unknown time Yes Unknown, Entered By History   bacitracin ointment Apply topically daily as needed for wound care To PEG site.   at PRN Yes Unknown, Entered By History   Bioflavonoid Products (VITAMIN C PLUS) 1000 MG TABS 1 tablet by Oral or FT or NG tube route 9/21/2019 at Unknown time Yes Unknown, Entered By History   Brivaracetam (BRIVIACT) 10 MG/ML solution 100 mg by Oral or Feeding Tube route 2 times daily 0900, 2100 9/21/2019 at x2 Yes Unknown, Entered By History   calcium carbonate 1250 (500 CA)  MG/5ML SUSP suspension 5 mLs (1,250 mg) by Per J Tube route 3 times daily (with meals) 9/21/2019 at x3 Yes Mariana Venegas MD   carBAMazepine (TEGRETOL) 100 MG/5ML suspension 150 mg by Oral or Feeding Tube route every 6 hours At 06:00, 12:00, 18:00 and 24:00 for seizures  9/21/2019 at x3 Yes Unknown, Entered By History   ferrous sulfate 220 (44 Fe) MG/5ML ELIX 220 mg by Per Feeding Tube route daily 9/21/2019 at Unknown time Yes Unknown, Entered By History   Guar Gum (FIBER MODULAR, NUTRISOURCE FIBER,) packet 1 packet by Per G Tube route daily 9/21/2019 at Unknown time Yes Starr Champion MD   hydrocortisone (CORTEF) 5 MG tablet 10 mg by Oral or FT or NG tube route daily (with dinner) At 1500 9/21/2019 at Unknown time Yes Unknown, Entered By History   hydrocortisone (CORTEF) 5 MG tablet 20 mg by Oral or FT or NG tube route every morning  9/21/2019 at Unknown time Yes Unknown, Entered By History   hydrocortisone 1 % CREA cream Place rectally 2 times daily as needed for other Apply to reddened memo areas as needed  at PRN Yes Unknown, Entered By History   ipratropium - albuterol 0.5 mg/2.5 mg/3 mL (DUONEB) 0.5-2.5 (3) MG/3ML neb solution Take 1 vial by nebulization every 4 hours as needed (bronchospasms or cough)  at PRN Yes Unknown, Entered By History   levothyroxine (SYNTHROID/LEVOTHROID) 150 MCG tablet Take 150 mcg by mouth every morning 9/21/2019 at Unknown time Yes Unknown, Entered By History   melatonin (MELATONIN) 1 MG/ML LIQD liquid 6 mg by Per NG tube route At Bedtime  9/20/2019 at Unknown time Yes Unknown, Entered By History   metoclopramide (REGLAN) 5 MG/5ML solution 5 mg by Per Feeding Tube route 2 times daily 9/21/2019 at x2 Yes Unknown, Entered By History   miconazole (MICATIN) 2 % AERP powder Apply topically 2 times daily as needed   at PRN Yes Unknown, Entered By History   mupirocin (BACTROBAN) 2 % external ointment Apply topically 2 times daily as needed   at PRN Yes Reported, Patient    pantoprazole (PROTONIX) 2 mg/mL SUSP suspension 20 mLs (40 mg) by Per J Tube route daily 9/21/2019 at Unknown time Yes Washington Connors MD   potassium & sodium phosphates (NEUTRA-PHOS) 280-160-250 MG Packet Take 1 packet by mouth 3 times daily 0900, 1500, 2100.  9/21/2019 at x3 Yes Unknown, Entered By History   Skin Protectants, Misc. (BALMEX SKIN PROTECTANT) OINT Externally apply topically 2 times daily as needed (irritation) Applay to reddened memo areas twice daily as needed  at PRN Yes Unknown, Entered By History   testosterone cypionate (DEPOTESTOTERONE CYPIONATE) 200 MG/ML injection Inject 76 mg into the muscle See Admin Instructions Every 2 weeks on Fridays   76 mg or 0.38 mL 9/21/2019 at Unknown time Yes Unknown, Entered By History   vitamin D3 (CHOLECALCIFEROL) 2000 units (50 mcg) tablet Take 2,000 Units by mouth daily Crush and feed via j-tube @@ 0900 9/21/2019 at Unknown time Yes Unknown, Entered By History   order for DME Equipment being ordered: Nebulizer   Starr Champion MD

## 2019-09-22 NOTE — PROGRESS NOTES
[facility]    Hospitalist Progress Note    Brief Summary:  Keyon Farias is a 59-year-old gentleman with past medical history of traumatic brain injury with residual aphasia and right-sided spastic hemiplegia, seizure disorder, history of panhypopituitarism and hypothyroidism, gastroesophageal reflux disease, dysphagia status post PEG placement and recurrent aspiration pneumonia that required multiple hospitalizations in [facility], who was just discharged from the hospital on 09/20/2019, and returns back to the hospital for evaluation of fever.        Assessment & Plan       1.  Sepsis, likely due to recurrent aspiration pneumonia versus pneumonitis:  He has severe dysphagia.  He is status post PEG tube placement.  He had 15 admissions to [facility] this year for similar symptoms of fevers related to aspiration pneumonias.  Last hospitalization was actually from 09/16 to 09/20/2019.  He was treated with IV Zosyn in the hospital and discharged home on p.o.  Augmentin for 1 week.  He was afebrile at the time of the discharge, and there was no leukocytosis.  He started having low-grade fevers again at home and started looking sick, so his mother brought him again to the ER.  In the ER, he had high fever of 103 degrees Fahrenheit.  He has white blood cells of 12.1.  His UA is negative.  Chest x-ray showed patchy opacity in the right mid lung, superimposed upon pulmonary vascular crowding that could represent infiltrate or pulmonary edema that seems to be similar to the prior study.  In the ER, he was given 1 dose of Zosyn and 1 dose of vancomycin.  At this time, it is not clear if his presentation is related to recurrent aspiration pneumonia versus incomplete treatment of a prior aspiration pneumonia versus possible healthcare-associated pneumonia.  So, we will continue with broad-spectrum antibiotics at this time, Zosyn and vancomycin.      Is now afebrile sepsis been resolved,  agree with treating him for healthcare associated pneumonia with vancomycin and Zosyn at this time.  I will send a MRSA screen if it came back negative we can discontinue the IV vancomycin.    2.  Hyponatremia, mild:  Sodium is 132.    Improved with IV fluids in normal range at this time.    3.  History of traumatic brain injury with residual aphasia, dysphagia and spastic right-sided hemiplegia:  He gets home health services as mentioned above.   consulted.   4.  History of seizure disorder:  We will continue his rlqrg-gm-ifldoynyz Tegretol and Briviact no seizures at this time.  5.  History of panhypopituitarism:  We will continue his qlmfl-xg-zzrkidwoy hydrocortisone 20 mg every morning and 10 mg with supper.   6.  Hypothyroidism:  We will continue his anewt-si-opzhqfxdt levothyroxine.   7.  Dysphagia:  Status post PEG placement, but with recurrent aspiration pneumonia that required hospitalization.  We will have Nutrition consult to resume his tube feedings.  Head of bed should be elevated at 45 degrees.   8.  History of pancreatic tail cyst that was evaluated by Dr. Davis recently, and it was recommended to follow up CT scan of the abdomen in a few months.   9.  Deep venous thrombosis prophylaxis:  The patient is mainly nonambulatory, so will order pneumatic compression device for now.          DVT Prophylaxis: Enoxaparin (Lovenox) SQ  Code Status: Full Code    Disposition: Expected discharge in 2-3 days once stable.    Kane Mars MD  Text Page  (7am - 6pm)    Interval History   Patient is nonverbal, but answering question with a gesture.  No fever at this time.  No nausea vomiting.  Overall improved at this time.    No other significant event overnight    -Data reviewed today: I reviewed all new labs and imaging results over the last 24 hours. I personally reviewed no images or EKG's today.    Physical Exam   Temp: 97.2  F (36.2  C) Temp src: Oral BP: 117/62 Pulse: 75 Heart Rate: 114 Resp: 16  SpO2: 94 % O2 Device: None (Room air)    Vitals:    09/21/19 2026   Weight: 68 kg (150 lb)     Vital Signs with Ranges  Temp:  [97.2  F (36.2  C)-103.1  F (39.5  C)] 97.2  F (36.2  C)  Pulse:  [] 75  Heart Rate:  [104-114] 114  Resp:  [16-30] 16  BP: ()/(52-69) 117/62  SpO2:  [93 %-100 %] 94 %  I/O last 3 completed shifts:  In: 465 [I.V.:245; NG/GT:120; IV Piggyback:100]  Out: -     Constitutional: awake, alert, cooperative, no apparent distress, and appears stated age  Eyes: Lids and lashes normal, pupils equal, round and reactive to light, extra ocular muscles intact, sclera clear, conjunctiva normal  Respiratory: No increased work of breathing, good air exchange, clear to auscultation bilaterally, no crackles or wheezing  Cardiovascular: Normal apical impulse, regular rate and rhythm, normal S1 and S2, no S3 or S4, and no murmur noted  GI: No scars, normal bowel sounds, soft, non-distended, non-tender, no masses palpated, no hepatosplenomegally  Musculoskeletal: no lower extremity pitting edema present  Neurologic: A phasic, right hemiplegia    Medications     IV fluid REPLACEMENT ONLY       sodium chloride 75 mL/hr at 09/22/19 0144       acetylcysteine  2 mL Nebulization Q6H     albuterol  2.5 mg Nebulization Q4H While awake     aspirin  81 mg Oral or Feeding Tube Daily     Brivaracetam  100 mg Oral or Feeding Tube BID     carBAMazepine  150 mg Oral or Feeding Tube Q6H     ferrous sulfate  300 mg Per Feeding Tube Daily     fiber modular (NUTRISOURCE FIBER)  1 packet Per Feeding Tube Daily     hydrocortisone  10 mg Per Feeding Tube Daily with supper     hydrocortisone  20 mg Per Feeding Tube QAM     levothyroxine  150 mcg Oral QAM AC     melatonin  6 mg Oral At Bedtime     metoclopramide  5 mg Per Feeding Tube BID     pantoprazole  40 mg Per J Tube Daily     piperacillin-tazobactam  3.375 g Intravenous Q6H     potassium & sodium phosphates  1 packet Oral TID     protein modular  1 packet Per Feeding  Tube BID 09 12     sodium chloride (PF)  3 mL Intracatheter Q8H     vancomycin (VANCOCIN) IV  1,500 mg Intravenous Q12H     vitamin D3  2,000 Units Oral Daily       Data   Recent Labs   Lab 09/22/19  1001 09/22/19  0829 09/21/19  2037 09/18/19  0908  09/16/19  1713   WBC 11.1*  --  12.1* 6.7   < > 10.1   HGB 10.5*  --  11.8* 10.1*   < > 12.8*   MCV 86  --  85 86   < > 86     --  278 150   < > 153   NA  --  135 132* 133   < > 128*   POTASSIUM  --  4.0 4.3 3.6   < > 4.2   CHLORIDE  --  105 99 102   < > 93*   CO2  --  27 27 24   < > 29   BUN  --  17 21 10   < > 14   CR  --  0.77 0.72 0.89   < > 0.85   ANIONGAP  --  3 6 7   < > 6   STEVE  --  7.6* 8.7 8.1*   < > 8.7   GLC  --  83 97 119*   < > 114*   ALBUMIN  --   --  2.9*  --   --  3.1*   PROTTOTAL  --   --  8.1  --   --  8.3   BILITOTAL  --   --  0.3  --   --  0.3   ALKPHOS  --   --  78  --   --  91   ALT  --   --  70  --   --  28   AST  --   --  70*  --   --  25    < > = values in this interval not displayed.       Recent Results (from the past 24 hour(s))   XR Chest 2 Views    Narrative    CHEST TWO VIEW   9/21/2019 8:54 PM     HISTORY: Pneumonia.    COMPARISON: CT chest dated 9/17/2019. Chest x-rays dated 9/16/2019 and  6/17/2019.    FINDINGS:  The lungs are hypoaerated. Subtle increased interstitial  markings in the lungs which are similar to the prior study dated  9/16/2019 and concerning for subtle infiltrate or vascular congestion.  No pneumothorax is identified. No definite pleural fluid collection is  seen. Cardiac silhouette appears mildly enlarged which is likely due  to technique. No fracture is identified. Feeding tube projected in the  abdomen is unchanged.      Impression    IMPRESSION:  1. Patchy opacity in the right midlung superimposed upon pulmonary  vascular crowding could represent an infiltrate or pulmonary edema.  This is similar to the prior study dated 9/16/2019.  2. Persistent hyperaeration.  3. Feeding tube projected in the abdomen is  unchanged.    SISI ALY MD

## 2019-09-22 NOTE — ED TRIAGE NOTES
Pt presents from home with fever and chills. Pt was just discharged yesterday with PNA. Mother reports she did give patient tylenol at home prior to arrival.

## 2019-09-22 NOTE — ED PROVIDER NOTES
History   Chief Complaint:  Fever    History is limited secondary to TBI. Available history is provided by nurse at bedside.     HPI   Keyon Farias is a 59 year old male, who was discharged yesterday after being treated for sepsis, discharged with Augmentin, due to recurrent aspiration pneumonia. The patient has a history of a TBI, septic shock, UTI, aspiration pneumonia, ventricular fibrillation, and cerebral artery occlusion who presents to the ED for evaluation of fever. The nurse reports the patient had developed a fever possibly today.    Allergies:  Valproic Acid  Phenytoin     Medications:    Mucomyst  Proventil  Aspirin 81 mg  Briviact  Tegretol  Glycate  Cortef  Levaquin  Synthroid  Melatonin  Zofran  Protonix  Neutra-phos  Vitamin D3  Levaquin     Past Medical History:    Aphasia due to TBI  DVT  GERD  Panhypopituitarism  Pneumonia  Seizures  Septic shock  Spastic hemiplegia  Thyroid disease  Tracheostomy  TBI  Cerebral artery occlusion with infarction  UTI  Ventricular fibrillation  Ventricular tachycardia     Past Surgical History:    Endoscopic ultrasound  EGD combined x4  Head and neck surgery  Jejunostomy tube change x4  Picc exchange   Appendectomy  Tube insertion  Tracheostomy x2  Ortho surgery  Vascular surgery     Family History:    Cancer    Social History:  Smoking status: Former-4/23/1989  Alcohol use: No  Drug use: No  PCP: Carlos Gomez  Marital Status:  Single [1]    Review of Systems   Unable to perform ROS: Patient nonverbal     Physical Exam     Patient Vitals for the past 24 hrs:   BP Temp Temp src Pulse Heart Rate Resp SpO2 Weight   09/21/19 2115 107/60 -- -- -- 111 18 95 % --   09/21/19 2100 -- -- -- 112 -- -- -- --   09/21/19 2045 110/68 -- -- -- -- -- -- --   09/21/19 2026 111/65 103.1  F (39.5  C) Oral 111 111 22 100 % 68 kg (150 lb)     Physical Exam  Vitals: reviewed by me  General: Pt seen on hospital pedro luis, sally, looking around the room, nonverbal at baseline.  Eyes:  Tracking well, clear conjunctiva BL  ENT: MMM, midline trachea.   Lungs: Mild respiratory distress, mild tachypnea, no accessory muscle use.  Protecting airway.  CV: Rate as above, regular rhythm.    Abd: Soft, non tender, no guarding, no rebound. Non distended  MSK: no peripheral edema or joint effusion.  No evidence of trauma  Skin: No rash, normal turgor and temperature  Neuro: no facial droop.  Psych: Not RIS, no e/o AH/VH      Emergency Department Course   Imaging:  Radiographic findings were communicated with the patient who voiced understanding of the findings.    XR Chest, 2 views:  1. Patchy opacity in the right midlung superimposed upon pulmonary  vascular crowding could represent an infiltrate or pulmonary edema.  This is similar to the prior study dated 9/16/2019.  2. Persistent hyperaeration.  3. Feeding tube projected in the abdomen is unchanged.    Imaging independently reviewed and agree with radiologist interpretation.    Laboratory:  CBC: WBC 12.1 (H), HGB 11.8 (L), o/w WNL ()    CMP: (L), Albumin 2.9 (L), AST 70 (H), o/w WNL (Creatinine 0.73)    UA: Protein Albumin 10, o/w Negative    ISTAT gases lactate gabe POCT: pH: 7.46 (H), PCO2: 42, PO2: 28, Bicarbonate: 30 (H), O2 Sat: 57, Lactic acid (2042): 2.1    Blood Culture x2: Pending    Interventions:  2040: NS 1L IV Bolus   2122: Zosyn 3.375 g IV Infusion    Emergency Department Course:  Past medical records, nursing notes, and vitals reviewed.  2035: I performed an exam of the patient and obtained history, as documented above.  IV inserted and blood drawn.  The patient was sent for a chest x-ray while in the emergency department, findings above.    Findings and plan explained to the Patient who consents to admission.     2134: Discussed the patient with Dr. Maldonado, who will admit the patient to a medical bed for further monitoring, evaluation, and treatment.     Impression & Plan    Medical Decision Making:  Keyon Farias is a 59 year old  male who presents to the emergency room with what appears to be recurrent pneumonia. He has some respiratory symptoms here, x-ray is concerning, and again he has a fever to 103. Given the fact he is on Augmentin only, will broaden coverage to include Zosyn as well as Vancomycin for resistant organisms. Will plan for admission to the care of Dr. Maldonado for continued care.     Diagnosis:    ICD-10-CM    1. Sepsis, due to unspecified organism (H) A41.9 Comprehensive metabolic panel     Disposition:  Admitted to a medical bed.    Alex Heath  9/21/2019    EMERGENCY DEPARTMENT  Scribe Disclosure:  I, Alex Heath, am serving as a scribe at 8:35 PM on 9/21/2019 to document services personally performed by Mark Moyer MD based on my observations and the provider's statements to me.         Mark Moyer MD  09/21/19 2591

## 2019-09-22 NOTE — ED NOTES
Bed: ED01  Expected date: 9/21/19  Expected time: 8:16 PM  Means of arrival: Ambulance  Comments:  Kerri M2 59M poss. pneu

## 2019-09-22 NOTE — PLAN OF CARE
DATE & TIME: 9/22/19 7-3pm  Cognitive Concerns/ Orientation : A&O, unable to assess orientation/nonverbal  BEHAVIOR & AGGRESSION TOOL COLOR: Green  CIWA SCORE: NA   ABNL VS/O2: Vss, on RA  MOBILITY: Total/turn Q2 hours  PAIN MANAGMENT: Denies  DIET: NPO/Tube feeding  BOWEL/BLADDER: Incontinent of both  ABNL LAB/BG: WNL  DRAIN/DEVICES: G-tube  TELEMETRY RHYTHM: NA  SKIN: Bruised/excoriated bottom/memo area; microguard powder applied  TESTS/PROCEDURES: NA  D/C DAY/GOALS/PLACE: Pending  OTHER IMPORTANT INFO: Sepsis protocol fired/lactic 1.5.  Lungs clear, on RA.  Turned Q2 hours while in bed.  NS infusing at 75/getting IV antibiotics.

## 2019-09-22 NOTE — PLAN OF CARE
Cognitive Concerns/ Orientation : Non-Verbal, HECTOR orientation    BEHAVIOR & AGGRESSION TOOL COLOR: Green, calm and cooperative       ABNL VS/O2: T max-100.5, other VSS on RA  MOBILITY: Total Care, turned and re-positioned q2h  PAIN MANAGMENT: No signs of pain/discomfort  DIET: NPO, will start TF today  BOWEL/BLADDER: Incontinent ob both, lg loose BM#1  ABNL LAB/BG:WBC-12.1, lactic 2.1, Bg218/89  DRAIN/DEVICES: PIV NS infusing  SKIN: Skin, pale and bruised. Jessica area and buttocks are red and excoriated-cares completed  TESTS/PROCEDURES: UC/BC done/pending  D/C DAY/GOALS/PLACE: Pending progress  OTHER IMPORTANT INFO: Frequent NPC, LS-diminished, turned and repositioned q 2 hrs, continues on Vanco/Zosyn/Nebs.           None Remove Edit

## 2019-09-23 LAB
ANION GAP SERPL CALCULATED.3IONS-SCNC: 4 MMOL/L (ref 3–14)
BUN SERPL-MCNC: 10 MG/DL (ref 7–30)
CALCIUM SERPL-MCNC: 8.1 MG/DL (ref 8.5–10.1)
CHLORIDE SERPL-SCNC: 110 MMOL/L (ref 94–109)
CO2 SERPL-SCNC: 25 MMOL/L (ref 20–32)
CREAT SERPL-MCNC: 0.72 MG/DL (ref 0.66–1.25)
GFR SERPL CREATININE-BSD FRML MDRD: >90 ML/MIN/{1.73_M2}
GLUCOSE BLDC GLUCOMTR-MCNC: 165 MG/DL (ref 70–99)
GLUCOSE SERPL-MCNC: 103 MG/DL (ref 70–99)
MAGNESIUM SERPL-MCNC: 2.1 MG/DL (ref 1.6–2.3)
PHOSPHATE SERPL-MCNC: 1.9 MG/DL (ref 2.5–4.5)
POTASSIUM SERPL-SCNC: 3.6 MMOL/L (ref 3.4–5.3)
SODIUM SERPL-SCNC: 139 MMOL/L (ref 133–144)

## 2019-09-23 PROCEDURE — 94640 AIRWAY INHALATION TREATMENT: CPT | Mod: 76

## 2019-09-23 PROCEDURE — 25000132 ZZH RX MED GY IP 250 OP 250 PS 637: Mod: GY | Performed by: INTERNAL MEDICINE

## 2019-09-23 PROCEDURE — 94640 AIRWAY INHALATION TREATMENT: CPT

## 2019-09-23 PROCEDURE — 00000146 ZZHCL STATISTIC GLUCOSE BY METER IP

## 2019-09-23 PROCEDURE — 40000275 ZZH STATISTIC RCP TIME EA 10 MIN

## 2019-09-23 PROCEDURE — 84100 ASSAY OF PHOSPHORUS: CPT | Performed by: INTERNAL MEDICINE

## 2019-09-23 PROCEDURE — 25000132 ZZH RX MED GY IP 250 OP 250 PS 637: Mod: GY

## 2019-09-23 PROCEDURE — 25000128 H RX IP 250 OP 636: Performed by: INTERNAL MEDICINE

## 2019-09-23 PROCEDURE — 36415 COLL VENOUS BLD VENIPUNCTURE: CPT | Performed by: INTERNAL MEDICINE

## 2019-09-23 PROCEDURE — 27210429 ZZH NUTRITION PRODUCT INTERMEDIATE LITER

## 2019-09-23 PROCEDURE — 12000000 ZZH R&B MED SURG/OB

## 2019-09-23 PROCEDURE — 99232 SBSQ HOSP IP/OBS MODERATE 35: CPT | Performed by: INTERNAL MEDICINE

## 2019-09-23 PROCEDURE — 25000125 ZZHC RX 250: Performed by: INTERNAL MEDICINE

## 2019-09-23 PROCEDURE — 80048 BASIC METABOLIC PNL TOTAL CA: CPT | Performed by: INTERNAL MEDICINE

## 2019-09-23 PROCEDURE — 25800030 ZZH RX IP 258 OP 636: Performed by: INTERNAL MEDICINE

## 2019-09-23 PROCEDURE — 83735 ASSAY OF MAGNESIUM: CPT | Performed by: INTERNAL MEDICINE

## 2019-09-23 RX ADMIN — PIPERACILLIN SODIUM,TAZOBACTAM SODIUM 3.38 G: 3; .375 INJECTION, POWDER, FOR SOLUTION INTRAVENOUS at 22:04

## 2019-09-23 RX ADMIN — Medication 1 PACKET: at 08:52

## 2019-09-23 RX ADMIN — POTASSIUM & SODIUM PHOSPHATES POWDER PACK 280-160-250 MG 1 PACKET: 280-160-250 PACK at 21:06

## 2019-09-23 RX ADMIN — HYDROCORTISONE 20 MG: 20 TABLET ORAL at 08:51

## 2019-09-23 RX ADMIN — METOCLOPRAMIDE 5 MG: 5 SOLUTION ORAL at 20:09

## 2019-09-23 RX ADMIN — IPRATROPIUM BROMIDE AND ALBUTEROL SULFATE 3 ML: .5; 3 SOLUTION RESPIRATORY (INHALATION) at 15:47

## 2019-09-23 RX ADMIN — ACETYLCYSTEINE 2 ML: 200 SOLUTION ORAL; RESPIRATORY (INHALATION) at 08:02

## 2019-09-23 RX ADMIN — ACETYLCYSTEINE 2 ML: 200 SOLUTION ORAL; RESPIRATORY (INHALATION) at 00:28

## 2019-09-23 RX ADMIN — IPRATROPIUM BROMIDE AND ALBUTEROL SULFATE 3 ML: .5; 3 SOLUTION RESPIRATORY (INHALATION) at 13:18

## 2019-09-23 RX ADMIN — IPRATROPIUM BROMIDE AND ALBUTEROL SULFATE 3 ML: .5; 3 SOLUTION RESPIRATORY (INHALATION) at 00:28

## 2019-09-23 RX ADMIN — VANCOMYCIN HYDROCHLORIDE 1500 MG: 5 INJECTION, POWDER, LYOPHILIZED, FOR SOLUTION INTRAVENOUS at 08:24

## 2019-09-23 RX ADMIN — ENOXAPARIN SODIUM 40 MG: 40 INJECTION SUBCUTANEOUS at 13:20

## 2019-09-23 RX ADMIN — METOCLOPRAMIDE 5 MG: 5 SOLUTION ORAL at 10:42

## 2019-09-23 RX ADMIN — IPRATROPIUM BROMIDE AND ALBUTEROL SULFATE 3 ML: .5; 3 SOLUTION RESPIRATORY (INHALATION) at 18:30

## 2019-09-23 RX ADMIN — POTASSIUM & SODIUM PHOSPHATES POWDER PACK 280-160-250 MG 1 PACKET: 280-160-250 PACK at 16:34

## 2019-09-23 RX ADMIN — CARBAMAZEPINE 150 MG: 100 SUSPENSION ORAL at 05:57

## 2019-09-23 RX ADMIN — MELATONIN TAB 3 MG 6 MG: 3 TAB at 21:06

## 2019-09-23 RX ADMIN — IPRATROPIUM BROMIDE AND ALBUTEROL SULFATE 3 ML: .5; 3 SOLUTION RESPIRATORY (INHALATION) at 08:01

## 2019-09-23 RX ADMIN — ACETYLCYSTEINE 2 ML: 200 SOLUTION ORAL; RESPIRATORY (INHALATION) at 18:31

## 2019-09-23 RX ADMIN — MINERAL SUPPLEMENT IRON 300 MG / 5 ML STRENGTH LIQUID 100 PER BOX UNFLAVORED 300 MG: at 13:20

## 2019-09-23 RX ADMIN — HYDROCORTISONE 10 MG: 10 TABLET ORAL at 16:35

## 2019-09-23 RX ADMIN — BRIVARACETAM 100 MG: 10 SOLUTION ORAL at 09:43

## 2019-09-23 RX ADMIN — CARBAMAZEPINE 150 MG: 100 SUSPENSION ORAL at 22:03

## 2019-09-23 RX ADMIN — BRIVARACETAM 100 MG: 10 SOLUTION ORAL at 21:06

## 2019-09-23 RX ADMIN — PIPERACILLIN SODIUM,TAZOBACTAM SODIUM 3.38 G: 3; .375 INJECTION, POWDER, FOR SOLUTION INTRAVENOUS at 03:44

## 2019-09-23 RX ADMIN — PIPERACILLIN SODIUM,TAZOBACTAM SODIUM 3.38 G: 3; .375 INJECTION, POWDER, FOR SOLUTION INTRAVENOUS at 16:35

## 2019-09-23 RX ADMIN — POTASSIUM & SODIUM PHOSPHATES POWDER PACK 280-160-250 MG 1 PACKET: 280-160-250 PACK at 08:51

## 2019-09-23 RX ADMIN — VANCOMYCIN HYDROCHLORIDE 1500 MG: 5 INJECTION, POWDER, LYOPHILIZED, FOR SOLUTION INTRAVENOUS at 20:09

## 2019-09-23 RX ADMIN — CHOLECALCIFEROL TAB 50 MCG (2000 UNIT) 2000 UNITS: 50 TAB at 08:54

## 2019-09-23 RX ADMIN — CARBAMAZEPINE 150 MG: 100 SUSPENSION ORAL at 16:35

## 2019-09-23 RX ADMIN — CARBAMAZEPINE 150 MG: 100 SUSPENSION ORAL at 10:43

## 2019-09-23 RX ADMIN — LEVOTHYROXINE SODIUM 150 MCG: 150 TABLET ORAL at 07:01

## 2019-09-23 RX ADMIN — PIPERACILLIN SODIUM,TAZOBACTAM SODIUM 3.38 G: 3; .375 INJECTION, POWDER, FOR SOLUTION INTRAVENOUS at 10:44

## 2019-09-23 RX ADMIN — MICONAZOLE NITRATE: 20 POWDER TOPICAL at 01:47

## 2019-09-23 RX ADMIN — ACETYLCYSTEINE 2 ML: 200 SOLUTION ORAL; RESPIRATORY (INHALATION) at 13:17

## 2019-09-23 RX ADMIN — Medication 40 MG: at 08:51

## 2019-09-23 RX ADMIN — ASPIRIN 81 MG 81 MG: 81 TABLET ORAL at 08:51

## 2019-09-23 RX ADMIN — SODIUM CHLORIDE: 9 INJECTION, SOLUTION INTRAVENOUS at 03:34

## 2019-09-23 RX ADMIN — Medication 1 PACKET: at 13:20

## 2019-09-23 ASSESSMENT — ACTIVITIES OF DAILY LIVING (ADL)
ADLS_ACUITY_SCORE: 37
ADLS_ACUITY_SCORE: 39
ADLS_ACUITY_SCORE: 35
ADLS_ACUITY_SCORE: 39
ADLS_ACUITY_SCORE: 39
ADLS_ACUITY_SCORE: 37

## 2019-09-23 NOTE — PROGRESS NOTES
SW:  D:   is acknowledging a SW consult for discharge planning.  Care Transitions RN generally follows for discharge planning to home with resumption of home care services.   Writer will close consult however will follow thru rounds and be available if requested.

## 2019-09-23 NOTE — PLAN OF CARE
DATE & TIME: 9/22/19 [id number]  Cognitive Concerns/ Orientation : A&O, unable to assess orientation/nonverbal  BEHAVIOR & AGGRESSION TOOL COLOR: Green  CIWA SCORE: NA    ABNL VS/O2: VSS on RA  MOBILITY: Total/turn Q2 hours  PAIN MANAGMENT: Denies  DIET: NPO/Tube feeding @ 100/hr  BOWEL/BLADDER: Incontinent of both, needs to be changed q2h.  ABNL LAB/BG: WNL  DRAIN/DEVICES: G-tube  TELEMETRY RHYTHM: NA  SKIN: Bruised/excoriated bottom/memo area; microguard powder and barrier cream applied.  TESTS/PROCEDURES: NA  D/C DAY/GOALS/PLACE: Pending progress  OTHER IMPORTANT INFO: Sepsis protocol fired/lactic 1.5 during day shift.  Lungs clear, on RA.  Turned Q2 hours while in bed.  NS infusing at 75/getting IV antibiotics. Pt right arm is tender and mother stated that pt is protective/doesn't like it to be touched.

## 2019-09-23 NOTE — PLAN OF CARE
DATE & TIME: 9/23/19 7-3pm           Cognitive Concerns/ Orientation : HECTOR. Patient is nonverbal   BEHAVIOR & AGGRESSION TOOL COLOR: Green     ABNL VS/O2: VSS on room air  MOBILITY: Total cares. Lift with assist x 2. Turned and repositioned  PAIN MANAGMENT: No nonverbal indicators of pain  DIET: NPO Tube feeding at 100cc/hr 7am-7pm; 60cc water flushes Q4  BOWEL/BLADDER: Incontinent of bowel and bladder  ABNL LAB/BG: N/a  DRAIN/DEVICES:PIV infusing at 75 ml/hr  TELEMETRY RHYTHM: N/a  SKIN: Bruised/excoriated buttocks and memo area. Micro guard and barrier cream applied  TESTS/PROCEDURES: N/a  D/C DAY/GOALS/PLACE: Pending progress  OTHER IMPORTANT INFO: Patient does not like right arm being touched.  Lungs clear on RA, vss afebrile.  Turned Q2 hours.

## 2019-09-23 NOTE — PROGRESS NOTES
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    Assessment & Plan     Active Problems:  Recurrent Aspiration pneumonia associated with Sepsis : Patient has severe underlying dysphasia and this is status post PEG tube placement.   Patient so far has been continued admissions to [facility] this year for similar symptoms of fever related to recurrent aspiration pneumonias.  His last hospitalization was recently from September 16 to September 20 when he was treated with IV Zosyn in the hospital and was discharged home on p.o. Augmentin for 1 week.  He was afebrile at the time of discharge.  After the discharge patient is started having low-grade fevers again at home and is started looking sick to his mother who brought him to the emergency room.  In ER he had high fever of 103  F .  Chest x-ray showed patchy opacity in the right midlung superimposed upon pulmonary  vasculature that could represent infiltrate or pulmonary edema that seems to be similar to the prior study.  Patient was given 1 dose of IV Zosyn and vancomycin.Patient was admitted and was started on IV Zosyn and IV Vancomycin.  Patient care was assumed this morning , seen and examined, looks clinically well, mother present at the bedside .  Chronic dysphasia status post PEG tube placement but with recurrent aspiration pneumonia that requires hospitalization;.  Nutrition has been consulted to resume his tube feedings    --continue to  monitor patient closely.  -- chest xray tomorrow along with CBC and pro calcitonin  --Continue patient on IV Zosyn and IV vancomycin at this point.  His MRSA screening test came back positive.  --We will discontinue IV fluids  --Appreciate nutrition help in managing tube feedings  -- Keep head of the bed elevated at 45 degrees.    Hyponatremia, mild: Improved sodium is up to 139.  --  Discontinue IV fluids.    History of traumatic brain injury with residual aphasia, dysphasia and the spastic right-sided hemiplegia:  History of panhypopituitarism:      --Continue PTA hydrocortisone 20 mg in the morning and 10 mg with supper   are consulted.    Hypothyroidism  --Continue PTA Synthroid    History of pancreatic tail cyst: Has been evaluated by Dr. Davis recently and is recommended to have outpatient follow-up CT scan of the abdomen in few months    Diet: NPO for Medical/Clinical Reasons Except for: NPO but receiving Tube Feeding, No Exceptions  Adult Formula Drip Feeding: Specified Time Isosource 1.5; Jejunostomy; Goal Rate: 100; mL/hr; From: 7:00 AM; 7:00 PM; Medication - Feeding Tube Flush Frequency: At least 15-30 mL water before and after medication administration and with tube clogg...    Lerma Catheter: not present  DVT Prophylaxis: Enoxaparin (Lovenox) SQ  Code Status: Full Code    The patient's care was discussed with the Patient and Patient's Family.    Disposition Plan   Expected discharge: Tentative discharge tomorrow if patient continues to feel well, mother is very concerned about patient getting discharged earlier when he is not completely ready.  Emotional support was provided discussed with her regarding reviewing chest x-ray and lab results tomorrow before making plan for discharge.  Patient care was assumed this morning, patient was seen and examined, mother also present in room.    Yanely Liriano MD, FACP  Text Page (7am -  6pm)    ----------------------------------------------------------------------------------------------------------------------    Interval History     Patient does not have any chest pain, shortness of breath or palpitation he looks comfortable, a phasic but is smiling and is cooperative with exam.  Mother also present at the bedside, told me that patient is looking better to her we discussed about repeating chest x-ray and lab results tomorrow.  We discussed about possible discharge tomorrow , mother was concerned that patient should not be discharged to early due to his recent recurrent hospitalization after going home.  All the questions been answered.    -Data reviewed today: I reviewed all new labs and imaging results over the last 24 hours.    I personally reviewed no images or EKG's today.    Physical Exam   Temp: 97.2  F (36.2  C) Temp src: Axillary BP: 116/58 Pulse: 85 Heart Rate: 80 Resp: 18 SpO2: 96 % O2 Device: None (Room air)    Vitals:    09/21/19 2026 09/23/19 0500   Weight: 68 kg (150 lb) 72.7 kg (160 lb 4.4 oz)     Vital Signs with Ranges  Temp:  [97.2  F (36.2  C)-98.7  F (37.1  C)] 97.2  F (36.2  C)  Pulse:  [85] 85  Heart Rate:  [72-85] 80  Resp:  [16-18] 18  BP: ()/(51-64) 116/58  SpO2:  [95 %-97 %] 96 %  I/O last 3 completed shifts:  In: 2290 [I.V.:1250; NG/GT:340]  Out: -     GENERAL: Alert , awake and oriented. NAD ,appropriate,  HEENT: Normocephalic. EOMI. No icterus or injection. Nares normal.   LUNGS: Clear to auscultation. No dyspnea at rest.   HEART: Regular rate. Extremities perfused.   ABDOMEN: Soft, nontender, and nondistended. Positive bowel sounds.  PEG tube in place a phasic  EXTREMITIES: No LE edema noted.   NEUROLOGIC: Aphasia and right hemiplegia    Medications     IV fluid REPLACEMENT ONLY       sodium chloride 75 mL/hr at 09/23/19 0334       acetylcysteine  2 mL Nebulization Q6H     albuterol  2.5 mg Nebulization Q4H While awake     aspirin  81 mg Oral or Feeding Tube  Daily     Brivaracetam  100 mg Oral or Feeding Tube BID     carBAMazepine  150 mg Oral or Feeding Tube Q6H     enoxaparin  40 mg Subcutaneous Q24H     ferrous sulfate  300 mg Per Feeding Tube Daily     fiber modular (NUTRISOURCE FIBER)  1 packet Per Feeding Tube Daily     hydrocortisone  10 mg Per Feeding Tube Daily with supper     hydrocortisone  20 mg Per Feeding Tube QAM     levothyroxine  150 mcg Oral QAM AC     melatonin  6 mg Oral At Bedtime     metoclopramide  5 mg Per Feeding Tube BID     pantoprazole  40 mg Per J Tube Daily     piperacillin-tazobactam  3.375 g Intravenous Q6H     potassium & sodium phosphates  1 packet Oral TID     protein modular  1 packet Per Feeding Tube BID 09 12     sodium chloride (PF)  3 mL Intracatheter Q8H     vancomycin (VANCOCIN) IV  1,500 mg Intravenous Q12H     vitamin D3  2,000 Units Oral Daily       Data   Recent Labs   Lab 09/23/19  0935 09/22/19  1001 09/22/19  0829 09/21/19  2037 09/18/19  0908  09/16/19  1713   WBC  --  11.1*  --  12.1* 6.7   < > 10.1   HGB  --  10.5*  --  11.8* 10.1*   < > 12.8*   MCV  --  86  --  85 86   < > 86   PLT  --  240  --  278 150   < > 153     --  135 132* 133   < > 128*   POTASSIUM 3.6  --  4.0 4.3 3.6   < > 4.2   CHLORIDE 110*  --  105 99 102   < > 93*   CO2 25  --  27 27 24   < > 29   BUN 10  --  17 21 10   < > 14   CR 0.72  --  0.77 0.72 0.89   < > 0.85   ANIONGAP 4  --  3 6 7   < > 6   STEVE 8.1*  --  7.6* 8.7 8.1*   < > 8.7   *  --  83 97 119*   < > 114*   ALBUMIN  --   --   --  2.9*  --   --  3.1*   PROTTOTAL  --   --   --  8.1  --   --  8.3   BILITOTAL  --   --   --  0.3  --   --  0.3   ALKPHOS  --   --   --  78  --   --  91   ALT  --   --   --  70  --   --  28   AST  --   --   --  70*  --   --  25    < > = values in this interval not displayed.       Imaging:   No results found for this or any previous visit (from the past 24 hour(s)).

## 2019-09-23 NOTE — PROGRESS NOTES
Possible discharge tomorrow.  Discussed with Dr Liriano.  Pt will need labs and a repeat CXR early AM, then Dr Liriano will have further discussion with pt's mother who is also his Guardian.  Have scheduled a stretcher transport for [address] [place] [address] for tomorrow in preparation, as his mother typically wants him to be home by noon.

## 2019-09-23 NOTE — PROGRESS NOTES
Pt resting in bed on room air sat 94%. Lung sounds coarse. Have him take a deep breath you can here crackles and some rhonchi. Able to cough  with much encouragement. Pt is taking nebs.  Cj  RT

## 2019-09-23 NOTE — PLAN OF CARE
DATE & TIME: 9/22/19, 0397 - 6981   Cognitive Concerns/ Orientation : HECTOR. Patient is nonverbal   BEHAVIOR & AGGRESSION TOOL COLOR: Green   ABNL VS/O2: VSS on room air  MOBILITY: Total cares. Lift with assist x 2. Turned and repositioned  PAIN MANAGMENT: No nonverbal indicators of pain  DIET: NPO Tube feeds  BOWEL/BLADDER: Incontinent of bowel and bladder  ABNL LAB/BG: N/a  DRAIN/DEVICES: G-tube clamped. PIV infusing at 75 ml/hr  TELEMETRY RHYTHM: N/a  SKIN: Bruised/excoriated buttocks and memo area. Micro guard and barrier cream applied  TESTS/PROCEDURES: N/a  D/C DAY/GOALS/PLACE: Pending progress  OTHER IMPORTANT INFO: Patient does not like right arm being touched

## 2019-09-23 NOTE — DISCHARGE INSTRUCTIONS
A referral has been sent to [facility] Physical therapy.  They will call you to schedule.  Their number is [phone]   Alert and oriented to person, place, time/situation. normal mood and affect. no apparent risk to self or others.

## 2019-09-24 ENCOUNTER — APPOINTMENT (OUTPATIENT)
Dept: GENERAL RADIOLOGY | Facility: CLINIC | Age: 59
DRG: 871 | End: 2019-09-24
Attending: INTERNAL MEDICINE
Payer: MEDICARE

## 2019-09-24 VITALS
HEART RATE: 93 BPM | WEIGHT: 163.14 LBS | BODY MASS INDEX: 22.13 KG/M2 | RESPIRATION RATE: 18 BRPM | OXYGEN SATURATION: 96 % | DIASTOLIC BLOOD PRESSURE: 53 MMHG | SYSTOLIC BLOOD PRESSURE: 99 MMHG | TEMPERATURE: 98.2 F

## 2019-09-24 LAB
ANION GAP SERPL CALCULATED.3IONS-SCNC: 4 MMOL/L (ref 3–14)
BACTERIA SPEC CULT: ABNORMAL
BACTERIA SPEC CULT: NO GROWTH
BACTERIA SPEC CULT: NO GROWTH
BUN SERPL-MCNC: 11 MG/DL (ref 7–30)
CALCIUM SERPL-MCNC: 8.3 MG/DL (ref 8.5–10.1)
CHLORIDE SERPL-SCNC: 107 MMOL/L (ref 94–109)
CO2 SERPL-SCNC: 26 MMOL/L (ref 20–32)
CREAT SERPL-MCNC: 0.76 MG/DL (ref 0.66–1.25)
ERYTHROCYTE [DISTWIDTH] IN BLOOD BY AUTOMATED COUNT: 14 % (ref 10–15)
GFR SERPL CREATININE-BSD FRML MDRD: >90 ML/MIN/{1.73_M2}
GLUCOSE SERPL-MCNC: 138 MG/DL (ref 70–99)
HCT VFR BLD AUTO: 31.3 % (ref 40–53)
HGB BLD-MCNC: 10.2 G/DL (ref 13.3–17.7)
Lab: NORMAL
Lab: NORMAL
MCH RBC QN AUTO: 28.1 PG (ref 26.5–33)
MCHC RBC AUTO-ENTMCNC: 32.6 G/DL (ref 31.5–36.5)
MCV RBC AUTO: 86 FL (ref 78–100)
PLATELET # BLD AUTO: 264 10E9/L (ref 150–450)
POTASSIUM SERPL-SCNC: 3.4 MMOL/L (ref 3.4–5.3)
PROCALCITONIN SERPL-MCNC: 0.13 NG/ML
RBC # BLD AUTO: 3.63 10E12/L (ref 4.4–5.9)
SODIUM SERPL-SCNC: 137 MMOL/L (ref 133–144)
SPECIMEN SOURCE: ABNORMAL
SPECIMEN SOURCE: NORMAL
SPECIMEN SOURCE: NORMAL
VANCOMYCIN SERPL-MCNC: 26.7 MG/L
WBC # BLD AUTO: 7.3 10E9/L (ref 4–11)

## 2019-09-24 PROCEDURE — 99238 HOSP IP/OBS DSCHRG MGMT 30/<: CPT | Performed by: INTERNAL MEDICINE

## 2019-09-24 PROCEDURE — 85027 COMPLETE CBC AUTOMATED: CPT | Performed by: INTERNAL MEDICINE

## 2019-09-24 PROCEDURE — 84145 PROCALCITONIN (PCT): CPT | Performed by: INTERNAL MEDICINE

## 2019-09-24 PROCEDURE — 25000132 ZZH RX MED GY IP 250 OP 250 PS 637: Mod: GY | Performed by: INTERNAL MEDICINE

## 2019-09-24 PROCEDURE — 40000275 ZZH STATISTIC RCP TIME EA 10 MIN

## 2019-09-24 PROCEDURE — 71045 X-RAY EXAM CHEST 1 VIEW: CPT

## 2019-09-24 PROCEDURE — 85049 AUTOMATED PLATELET COUNT: CPT | Performed by: INTERNAL MEDICINE

## 2019-09-24 PROCEDURE — 94640 AIRWAY INHALATION TREATMENT: CPT | Mod: 76

## 2019-09-24 PROCEDURE — 36415 COLL VENOUS BLD VENIPUNCTURE: CPT | Performed by: INTERNAL MEDICINE

## 2019-09-24 PROCEDURE — 94640 AIRWAY INHALATION TREATMENT: CPT

## 2019-09-24 PROCEDURE — 80202 ASSAY OF VANCOMYCIN: CPT | Performed by: INTERNAL MEDICINE

## 2019-09-24 PROCEDURE — 25000128 H RX IP 250 OP 636: Performed by: INTERNAL MEDICINE

## 2019-09-24 PROCEDURE — 25000125 ZZHC RX 250: Performed by: INTERNAL MEDICINE

## 2019-09-24 PROCEDURE — 80048 BASIC METABOLIC PNL TOTAL CA: CPT | Performed by: INTERNAL MEDICINE

## 2019-09-24 RX ORDER — CHOLECALCIFEROL (VITAMIN D3) 50 MCG
2000 TABLET ORAL DAILY
Status: DISCONTINUED | OUTPATIENT
Start: 2019-09-25 | End: 2019-09-24 | Stop reason: HOSPADM

## 2019-09-24 RX ORDER — LEVOTHYROXINE SODIUM 150 UG/1
150 TABLET ORAL
Status: DISCONTINUED | OUTPATIENT
Start: 2019-09-25 | End: 2019-09-24 | Stop reason: HOSPADM

## 2019-09-24 RX ADMIN — PIPERACILLIN SODIUM,TAZOBACTAM SODIUM 3.38 G: 3; .375 INJECTION, POWDER, FOR SOLUTION INTRAVENOUS at 09:09

## 2019-09-24 RX ADMIN — Medication 1 PACKET: at 08:38

## 2019-09-24 RX ADMIN — IPRATROPIUM BROMIDE AND ALBUTEROL SULFATE 3 ML: .5; 3 SOLUTION RESPIRATORY (INHALATION) at 02:20

## 2019-09-24 RX ADMIN — LEVOTHYROXINE SODIUM 150 MCG: 150 TABLET ORAL at 06:58

## 2019-09-24 RX ADMIN — ASPIRIN 81 MG 81 MG: 81 TABLET ORAL at 08:36

## 2019-09-24 RX ADMIN — HYDROCORTISONE 20 MG: 20 TABLET ORAL at 08:37

## 2019-09-24 RX ADMIN — Medication 40 MG: at 08:36

## 2019-09-24 RX ADMIN — IPRATROPIUM BROMIDE AND ALBUTEROL SULFATE 3 ML: .5; 3 SOLUTION RESPIRATORY (INHALATION) at 07:53

## 2019-09-24 RX ADMIN — METOCLOPRAMIDE 5 MG: 5 SOLUTION ORAL at 08:36

## 2019-09-24 RX ADMIN — ACETYLCYSTEINE 2 ML: 200 SOLUTION ORAL; RESPIRATORY (INHALATION) at 02:20

## 2019-09-24 RX ADMIN — BRIVARACETAM 100 MG: 10 SOLUTION ORAL at 08:55

## 2019-09-24 RX ADMIN — CHOLECALCIFEROL TAB 50 MCG (2000 UNIT) 2000 UNITS: 50 TAB at 08:36

## 2019-09-24 RX ADMIN — POTASSIUM & SODIUM PHOSPHATES POWDER PACK 280-160-250 MG 1 PACKET: 280-160-250 PACK at 08:37

## 2019-09-24 RX ADMIN — ACETYLCYSTEINE 2 ML: 200 SOLUTION ORAL; RESPIRATORY (INHALATION) at 07:53

## 2019-09-24 RX ADMIN — PIPERACILLIN SODIUM,TAZOBACTAM SODIUM 3.38 G: 3; .375 INJECTION, POWDER, FOR SOLUTION INTRAVENOUS at 04:56

## 2019-09-24 RX ADMIN — CARBAMAZEPINE 150 MG: 100 SUSPENSION ORAL at 05:07

## 2019-09-24 ASSESSMENT — ACTIVITIES OF DAILY LIVING (ADL)
ADLS_ACUITY_SCORE: 39

## 2019-09-24 NOTE — DISCHARGE SUMMARY
[facility]  Hospitalist Discharge Summary       Date of Admission:  9/21/2019  Date of Discharge:  9/24/2019  Discharging Provider: Yanely Liriano MD      Discharge Diagnoses   Recurrent Aspiration pneumonia associated with Sepsis  Hyponatremia, mild   History of traumatic brain injury with residual aphasia, dysphasia and the spastic right-sided hemiplegia  History of panhypopituitarism  Hypothyroidism  History of pancreatic tail cyst    Follow-ups Needed After Discharge   Follow-up Appointments     Follow-up and recommended labs and tests      Follow up with primary care provider, Carlos Gomez, within 7 days to   evaluate medication change, for hospital follow- up and to follow up on   results.  No follow up labs or test are needed.         Please discuss long term plan of care and code status with patient,s family , this is patient,s 15 th admission this year and he remains at high risk for recurrent hospitalization and development of septic shock and need for intubation.    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 Here are further details regarding his hospitalization;      Recurrent Aspiration pneumonia associated with Sepsis : Patient has severe underlying dysphasia and this is status post PEG  tube placement.   Patient so far has been continued admissions to [facility] this year for similar symptoms of fever related to recurrent aspiration pneumonias.  His last hospitalization was recently from September 16 to September 20 when he was treated with IV Zosyn in the hospital and was discharged home on p.o. Augmentin for 1 week.  He was afebrile at the time of discharge.  After the discharge patient is started having low-grade fevers again at home and is started looking sick to his mother who brought him to the emergency room.  In ER he had high fever of 103  F .  Chest x-ray showed patchy opacity in the right midlung superimposed upon pulmonary  vasculature that could represent infiltrate or pulmonary edema that seems to be similar to the prior study.  Patient was given 1 dose of IV Zosyn and vancomycin.Patient was admitted and was started on IV Zosyn and IV Vancomycin.  Patient was seen and examined, looks clinically well,chronic dysphasia status post PEG tube placement but with recurrent aspiration pneumonia that requires hospitalization;. Nutrition has been consulted to resume his tube feedings     --continue to monitor patient closely.  -- chest xray from this morning is showing improvement in PNA, CBC is showing normal WBC counts and pro calcitonin is showing low risk fir systemic infection  -- Discontinue IV Zosyn and IV vancomycin at this point.  His MRSA screening test came back positive.he will be finishing 5 more days of   -- continue tube feeds after discharge   -- Keep head of the bed elevated at 45 degrees.  -- Discussed the plan of care with Mother this morning   -- Will ask PCP to have further discussion with [place] regarding code status and goals of care due to recurrent hospitalization ( 16 th admission this year )     Hyponatremia, mild: Improved sodium is up to 139.     History of traumatic brain injury with residual aphasia, dysphasia and the spastic right-sided  hemiplegia:  History of panhypopituitarism:       --Continue PTA hydrocortisone 20 mg in the morning and 10 mg with supper     Hypothyroidism  --Continue PTA Synthroid     History of pancreatic tail cyst: Has been evaluated by Dr. Davis recently and is recommended to have outpatient follow-up CT scan of the abdomen in few months    Patient was seen and examined on the day of discharge , he is feeling well, does not have any complaints , I did review the discharge medications and instructions with Patient,s mother and plan for him to follow up with the PCP after the hospitalization .he will be finishing his course of oral Augmentin after the discharge SyedSavannah lee (GUARDIAN) was in agreement , he is discharged in stable condition back to his home.    Consultations This Hospital Stay   PHARMACY TO DOSE VANCO  NUTRITION SERVICES ADULT IP CONSULT  PHARMACY TO DOSE VANCO  SOCIAL WORK IP CONSULT  PHARMACY IP CONSULT  CARE TRANSITION RN/SW IP CONSULT    Code Status   Full Code    Time Spent on this Encounter   I, Yanely Liriano MD, personally saw the patient today and spent less than or equal to 30 minutes discharging this patient.       Yanely Liriano MD  [facility]  ______________________________________________________________________    Physical Exam   Vital Signs: Temp: 98.2  F (36.8  C) Temp src: Oral BP: 99/53 Pulse: 93 Heart Rate: 68 Resp: 18 SpO2: 96 % O2 Device: None (Room air)    Weight: 163 lbs 2.25 oz    GENERAL: Alert , awake and oriented. NAD ,appropriate,  HEENT: Normocephalic. EOMI. No icterus or injection. Nares normal.   LUNGS: Clear to auscultation. No dyspnea at rest. No wheezing or crackles   HEART: Regular rate. Extremities perfused.   ABDOMEN: Soft, nontender, and nondistended. Positive bowel sounds.  PEG tube in place a phasic  EXTREMITIES: No LE edema noted.   NEUROLOGIC: Aphasia and right hemiplegia       Primary Care Physician   Carlos Gomez    Discharge Orders      Home care  nursing referral      Home Care PT Referral for Hospital Discharge      Home Care OT Referral for Hospital Discharge      Reason for your hospital stay    You were admitted to the hospital secondary to aspiration pneumonia     Follow-up and recommended labs and tests    Follow up with primary care provider, Carlos Gomez, within 7 days to evaluate medication change, for hospital follow- up and to follow up on results.  No follow up labs or test are needed.     Activity    Your activity upon discharge: activity as tolerated     Discharge Instructions    Please finish the course of Augmentin after discharge , you already have this antibiotic which was given to you on 09/21 .     Full Code     Diet    Follow this diet upon discharge: Orders Placed This Encounter      Adult Formula Drip Feeding: Specified Time Isosource 1.5; Jejunostomy; Goal Rate: 100; mL/hr; From: 7:00 AM; 7:00 PM; Medication - Feeding Tube Flush Frequency: At least 15-30 mL water before and after medication administration and with tube clogg...      NPO for Medical/Clinical Reasons Except for: NPO but receiving Tube Feeding, No Exceptions       Significant Results and Procedures   Results for orders placed or performed during the hospital encounter of 09/21/19   XR Chest 2 Views    Narrative    CHEST TWO VIEW   9/21/2019 8:54 PM     HISTORY: Pneumonia.    COMPARISON: CT chest dated 9/17/2019. Chest x-rays dated 9/16/2019 and  6/17/2019.    FINDINGS:  The lungs are hypoaerated. Subtle increased interstitial  markings in the lungs which are similar to the prior study dated  9/16/2019 and concerning for subtle infiltrate or vascular congestion.  No pneumothorax is identified. No definite pleural fluid collection is  seen. Cardiac silhouette appears mildly enlarged which is likely due  to technique. No fracture is identified. Feeding tube projected in the  abdomen is unchanged.      Impression    IMPRESSION:  1. Patchy opacity in the right midlung  superimposed upon pulmonary  vascular crowding could represent an infiltrate or pulmonary edema.  This is similar to the prior study dated 9/16/2019.  2. Persistent hyperaeration.  3. Feeding tube projected in the abdomen is unchanged.    SISI ALY MD   XR Chest Port 1 View    Narrative    CHEST PORTABLE ONE VIEW September 24, 2019 7:45 AM     HISTORY: Follow up on aspiration pneumonia.    COMPARISON: Chest x-ray 9/21/2019.      Impression    IMPRESSION: Portable chest. Lungs are hypoaerated with mild  atelectasis right lung base. Left lung is clear. Patchy opacity right  midlung appears slightly improved. Heart is normal in size. No  pneumothorax.       Discharge Medications   Current Discharge Medication List      CONTINUE these medications which have NOT CHANGED    Details   acetylcysteine (MUCOMYST) 20 % neb solution Take 2 mLs by nebulization every 6 hours With albuterol at 0700, 1100, 1500, and 1900      albuterol (PROVENTIL) (5 MG/ML) 0.5% neb solution Take 2.5 mg by nebulization every 4 hours (while awake) 0700 1100 1500 1900 with mucomyst       amoxicillin-clavulanate (AUGMENTIN) 875-125 MG tablet Take 1 tablet through feeding tube 2 times daily. Day 1 was 9/20/2019      aspirin (ASA) 81 MG chewable tablet 81 mg by Oral or Feeding Tube route daily At 0900      bacitracin ointment Apply topically daily as needed for wound care To PEG site.       Bioflavonoid Products (VITAMIN C PLUS) 1000 MG TABS 1 tablet by Oral or FT or NG tube route      Brivaracetam (BRIVIACT) 10 MG/ML solution 100 mg by Oral or Feeding Tube route 2 times daily 0900, 2100      calcium carbonate 1250 (500 CA) MG/5ML SUSP suspension 5 mLs (1,250 mg) by Per J Tube route 3 times daily (with meals)  Qty: 450 mL    Associated Diagnoses: Malnutrition (H)      carBAMazepine (TEGRETOL) 100 MG/5ML suspension 150 mg by Oral or Feeding Tube route every 6 hours At 06:00, 12:00, 18:00 and 24:00 for seizures       ferrous sulfate 220 (44 Fe) MG/5ML  ELIX 220 mg by Per Feeding Tube route daily      Guar Gum (FIBER MODULAR, NUTRISOURCE FIBER,) packet 1 packet by Per G Tube route daily  Qty: 30 packet, Refills: 0    Associated Diagnoses: Pneumonia of both lower lobes due to infectious organism (H)      !! hydrocortisone (CORTEF) 5 MG tablet 10 mg by Oral or FT or NG tube route daily (with dinner) At 1500      !! hydrocortisone (CORTEF) 5 MG tablet 20 mg by Oral or FT or NG tube route every morning       hydrocortisone 1 % CREA cream Place rectally 2 times daily as needed for other Apply to reddened memo areas as needed      ipratropium - albuterol 0.5 mg/2.5 mg/3 mL (DUONEB) 0.5-2.5 (3) MG/3ML neb solution Take 1 vial by nebulization every 4 hours as needed (bronchospasms or cough)      levothyroxine (SYNTHROID/LEVOTHROID) 150 MCG tablet Take 150 mcg by mouth every morning      melatonin (MELATONIN) 1 MG/ML LIQD liquid 6 mg by Per NG tube route At Bedtime       metoclopramide (REGLAN) 5 MG/5ML solution 5 mg by Per Feeding Tube route 2 times daily      miconazole (MICATIN) 2 % AERP powder Apply topically 2 times daily as needed       mupirocin (BACTROBAN) 2 % external ointment Apply topically 2 times daily as needed       pantoprazole (PROTONIX) 2 mg/mL SUSP suspension 20 mLs (40 mg) by Per J Tube route daily  Qty: 400 mL, Refills: 1    Associated Diagnoses: Gastroesophageal reflux disease, esophagitis presence not specified      potassium & sodium phosphates (NEUTRA-PHOS) 280-160-250 MG Packet Take 1 packet by mouth 3 times daily 0900, 1500, 2100.       Skin Protectants, Misc. (BALMEX SKIN PROTECTANT) OINT Externally apply topically 2 times daily as needed (irritation) Applay to reddened memo areas twice daily as needed      testosterone cypionate (DEPOTESTOTERONE CYPIONATE) 200 MG/ML injection Inject 76 mg into the muscle See Admin Instructions Every 2 weeks on Fridays   76 mg or 0.38 mL      vitamin D3 (CHOLECALCIFEROL) 2000 units (50 mcg) tablet Take 2,000  Units by mouth daily Crush and feed via j-tube @@ 0900      order for DME Equipment being ordered: Nebulizer  Qty: 1 Units, Refills: 0    Associated Diagnoses: Pneumonia of both lower lobes due to infectious organism (H)       !! - Potential duplicate medications found. Please discuss with provider.        Allergies   Allergies   Allergen Reactions     Dilantin [Phenytoin Sodium]      Valproic Acid      Toxicity c bone marrow suspension, elevated ammonia levels

## 2019-09-24 NOTE — PROVIDER NOTIFICATION
MD Notification    Notified Person: MD    Notified Person Name:    Notification Date/Time: 9/24/19 0846     Notification Interaction: paged provider    Purpose of Notification: Critical Lab received: Vanco level 26.7    Orders Received: discontinue vanco, increase free water flush to 150 ml q4hr    Comments:

## 2019-09-24 NOTE — PLAN OF CARE
DATE & TIME: 9/23/19 3-11pm          Cognitive Concerns/ Orientation : HECTOR. Patient is nonverbal -can nod/ thumbs up for yes's/approval.  BEHAVIOR & AGGRESSION TOOL COLOR: Green     ABNL VS/O2: VSS on room air  MOBILITY: Total cares. Lift with assist x 2. Turned and repositioned  PAIN MANAGMENT: No nonverbal indicators of pain  DIET: NPO Tube feeding at 100cc/hr 7am-7pm; off for night 60cc water flushes Q4  BOWEL/BLADDER: Incontinent of bowel and bladder- large amounts of stool  ABNL LAB/BG: N/a  DRAIN/DEVICES:PIV SL  TELEMETRY RHYTHM: N/a  SKIN: Bruised/excoriated buttocks and memo area. Micro guard and barrier cream applied  TESTS/PROCEDURES: N/a  D/C DAY/GOALS/PLACE: Pending progress  OTHER IMPORTANT INFO: Patient does not like right arm being touched, will raise himself if needed.

## 2019-09-24 NOTE — PROGRESS NOTES
PCS form has been faxed to [facility].  Discharge orders have been faxed to [facility]( [phone]) and [facility] ([phone]).

## 2019-09-24 NOTE — PLAN OF CARE
Discharge    Patient discharged to home via [facility]  Care plan note:  DATE & TIME: 9/24/19 0856-3136         Cognitive Concerns/ Orientation : HECTOR. Patient is nonverbal   BEHAVIOR & AGGRESSION TOOL COLOR: Green     ABNL VS/O2: VSS on RA  MOBILITY: Assist of 2 to turn/repo q2hr, up with lift.   PAIN MANAGMENT: No s/s of pain   DIET: NPO. Tube feeding from 6011-2303  BOWEL/BLADDER: Incontinent of b/b  ABNL LAB/BG: Critical vanco level 26.7, MD aware.   DRAIN/DEVICES: PIV, SL   TELEMETRY RHYTHM: n/a  SKIN: Bruised/excoriated buttocks and memo area. Micro guard and barrier cream applied  TESTS/PROCEDURES: n/a  D/C DAY/GOALS/PLACE: to home today   OTHER IMPORTANT INFO:     Listed belongings gathered and returned to patient. Yes  Care Plan and Patient education resolved: Yes  Prescriptions if needed, hard copies sent with patient  NA  Home and hospital acquired medications returned to patient: NA  Medication Bin checked and emptied on discharge Yes  Follow up appointment made for patient: Yes

## 2019-09-24 NOTE — PLAN OF CARE
DATE & TIME: 9/24/2019 [id number]    Cognitive Concerns/ Orientation : HECTOR; pt is nonverbal w/ history of TBI.  BEHAVIOR & AGGRESSION TOOL COLOR: green  CIWA SCORE: na   ABNL VS/O2: VSS on RA. Bradycardic at times.   MOBILITY: Total care; lift x 2 if needed. Turn and repo q2hrs.   PAIN MANAGMENT: denies  DIET: NPO - 60 ml water flushes at night; Tube feeding 7a-7p.  BOWEL/BLADDER: Incontinent of bowel and bladder; BM on shift  ABNL LAB/BG: na   DRAIN/DEVICES: PIV SL  TELEMETRY RHYTHM: na  SKIN: Buttox and memo area w/ redness and excoriation; barrier cream applied  TESTS/PROCEDURES: na  D/C DAY/GOALS/PLACE: pending clinical progress  OTHER IMPORTANT INFO: Pt does not want staff touching right arm. Contact precautions for MRSA and VRE. Right sided hemiplegic; has left arm strength

## 2019-09-24 NOTE — CONSULTS
Care Transition Initial Assessment - RN   Conversed with pt's Mother Savannah, that is also his legal Guardian via phone late yesterday      DATA   Active Problems:    Fever       Cognitive Status: Hx TBI, nonverbal      Contact information and PCP information verified: Yes  Lives With: parent(s)      Insurance concerns: No Insurance issues identified  ASSESSMENT  Patient currently receives the following services: [facility] OT/PT, [facility] RN 12 Hr coverage daily and 12 hr PCA adarsh/night thorugh [facility] (currently 80.5 hours every 7 days) and TF with supplies through [facility]        Identified issues/concerns regarding health management:Planning potential discharge home today. Rounding Hospitalist, Dr Liriano is planning to call Savannah after his morning labs and CXR are available.  [employer] transportation has been set up via stretcher for 1045 today with tentative discharge plan.  Savannah was going to call pt's home care RN to alert him on potential discharge today.       PLAN  Financial costs for the patient include:NA.    Will need resumption orders for home care PT/OT     Patient anticipates needs for home equipment: No      Appointments:  PCP 10/1/19 at 2:00PM        Care  (CTS) will continue to follow as needed.

## 2019-09-26 ENCOUNTER — DOCUMENTATION ONLY (OUTPATIENT)
Dept: OTHER | Facility: CLINIC | Age: 59
End: 2019-09-26

## 2019-09-27 ENCOUNTER — OFFICE VISIT (OUTPATIENT)
Dept: PULMONOLOGY | Facility: CLINIC | Age: 59
End: 2019-09-27
Payer: MEDICARE

## 2019-09-27 ENCOUNTER — ANCILLARY PROCEDURE (OUTPATIENT)
Dept: GENERAL RADIOLOGY | Facility: CLINIC | Age: 59
End: 2019-09-27
Payer: MEDICARE

## 2019-09-27 ENCOUNTER — PRE VISIT (OUTPATIENT)
Dept: PULMONOLOGY | Facility: CLINIC | Age: 59
End: 2019-09-27

## 2019-09-27 VITALS
OXYGEN SATURATION: 97 % | WEIGHT: 163 LBS | SYSTOLIC BLOOD PRESSURE: 100 MMHG | HEART RATE: 68 BPM | RESPIRATION RATE: 17 BRPM | DIASTOLIC BLOOD PRESSURE: 68 MMHG | HEIGHT: 72 IN | BODY MASS INDEX: 22.08 KG/M2

## 2019-09-27 DIAGNOSIS — Z23 ENCOUNTER FOR IMMUNIZATION: ICD-10-CM

## 2019-09-27 DIAGNOSIS — J69.0 ASPIRATION PNEUMONIA OF RIGHT LOWER LOBE, UNSPECIFIED ASPIRATION PNEUMONIA TYPE (H): Primary | ICD-10-CM

## 2019-09-27 DIAGNOSIS — J84.9 ILD (INTERSTITIAL LUNG DISEASE) (H): Primary | ICD-10-CM

## 2019-09-27 DIAGNOSIS — J69.0 ASPIRATION PNEUMONIA (H): ICD-10-CM

## 2019-09-27 PROCEDURE — G0008 ADMIN INFLUENZA VIRUS VAC: HCPCS | Mod: ZF

## 2019-09-27 PROCEDURE — G0463 HOSPITAL OUTPT CLINIC VISIT: HCPCS | Mod: ZF

## 2019-09-27 PROCEDURE — 25000128 H RX IP 250 OP 636: Mod: ZF | Performed by: INTERNAL MEDICINE

## 2019-09-27 PROCEDURE — 90682 RIV4 VACC RECOMBINANT DNA IM: CPT | Mod: ZF | Performed by: INTERNAL MEDICINE

## 2019-09-27 RX ORDER — SCOPOLAMINE HYDROBROMIDE
0.4 POWDER (GRAM) MISCELLANEOUS 3 TIMES DAILY PRN
Qty: 5 G | Refills: 11 | Status: SHIPPED | OUTPATIENT
Start: 2019-09-27 | End: 2019-09-30

## 2019-09-27 RX ADMIN — INFLUENZA A VIRUS A/BRISBANE/02/2018 (H1N1) RECOMBINANT HEMAGGLUTININ ANTIGEN, INFLUENZA A VIRUS A/KANSAS/14/2017 (H3N2) RECOMBINANT HEMAGGLUTININ ANTIGEN, INFLUENZA B VIRUS B/PHUKET/3073/2013 RECOMBINANT HEMAGGLUTININ ANTIGEN, AND INFLUENZA B VIRUS B/MARYLAND/15/2016 RECOMBINANT HEMAGGLUTININ ANTIGEN 0.5 ML: 45; 45; 45; 45 INJECTION INTRAMUSCULAR at 14:09

## 2019-09-27 ASSESSMENT — MIFFLIN-ST. JEOR: SCORE: 1602.36

## 2019-09-27 ASSESSMENT — PAIN SCALES - GENERAL: PAINLEVEL: NO PAIN (0)

## 2019-09-27 NOTE — PATIENT INSTRUCTIONS
Flu shot today    Start using scopolamine three times daily as needed to help decrease secretions, if have trouble filling prescription call the clinic and can try to fill at different pharmacy    Can follow up in Pulmonary clinic as needed

## 2019-09-27 NOTE — NURSING NOTE
Chief Complaint   Patient presents with     Consult     Aspiration pneumonia chronic     Medications reviewed and vital signs taken.   Emerald Bedoya CMA

## 2019-09-27 NOTE — PROGRESS NOTES
[facility]    Pulmonary, Allergy, Critical Care and Sleep Medicine    Initial Clinic Visit   09/27/2019    Keyon Farias MRN# [id number]   Age: 59 year old YOB: 1960      Assessment and Recommendations:    Keyon Farias is a 59 year old male with a history of TBI 30 years ago c/b aphasia, spastic hemiplegia, panhypopituitarism, VF arrest in 2016, recurrent aspiration pneumonia/pneumonitis with 15+ hospital admissions during 2019 who presents for evaluation of recurrent aspiration pneumonia.    Recurrent Aspiration Pneumonia   In setting of prior TBI with likely significant dysphagia. Problem has continued despite transition to strict nutrition by tube feeds several years ago. Patient's nurse does not believe he is capable of swallowing own secretions but resists efforts to cough them up or be suctioned. Discussed that could try to decrease amount of secretions. Since scopolamine worked in past will try to see if can benefit from oral formula to avoid prior skin reaction. Discussed with patient's mother that unlikely would be able to eat again and certainly not while aspirating so frequently. Patient's mother and nurse also wondered why sputum cultures never sent off and discussed difficulty of getting a good sample. Discussed that bronchoscopy would be difficult without intubation and currently would not change antibiotic regimen all that much.   - Scopolamine oral powder to be mixed, 0.4 mg TID PRN, started with lowest dose and can up-titrate in future  - Influenza shot today  - Return to Pulmonary clinic in the future if needed      Seen and discussed with Dr. Perlman Christine Lambert MD PhD  Pulmonary and Critical Care Fellow   Pager [phone]    Attending Note:    The patient was seen examined by me with the pulmonary fellow, I have personally reviewed the imaging studies, lab and culture results.  The note reflects our joint findings, assessment and plan.      Adarsh  Perlman, M.D.  Pulmonary, Allergy and Critical Care.      Chief Complaint and History of Present Illness:    CC:  Recurrent Aspiration Pneumonia    HPI:     Patient non-verbal at baseline, history provided by patient's mother and home care RN and chart review.    Per EMR hospitalized frequently at [facility] including three times within the past month. Approximately 16 admissions during 2019, all but one for aspiration pneumonia/pneumonitis sometimes complicated by sepsis. Typically presents with primary complaint of fevers and will be found to have an infiltrate on imaging. Did have a tracheostomy several years ago that was de cannulated, no admissions in the past couple of years have required intubation. Will be discharged with oral or sometimes IV antibiotics. In the past month re-presented within one to two days after each admission. Has been evaluated by GI and ID during past hospitalizations. Only a couple of sputum cultures collected in the last few years, no recent bronchoscopy. During an April admission, efforts were made to discuss goals of care and have Palliative care consult which greatly upset patient's mother.    Today patient's mother and home care nurse state they wanted to be seen to ensure they were taking all steps they could to prevent additional hospitalizations. They are also frustrated by the recent hospital stays and report that symptoms are being treated but not the underlying problem. When asked what believe causes Keyon's aspiration events RN states it is a combination of physical and personality issues. Keyon rarely coughs and spits out sputum as directed, will often swallow it with likely aspiration, and often resists efforts to be suctioned when coughing. Cough tends to build up over the course of the day, can cough for 10-15 minutes at a time. Often sounds coarse in right lungs with left lungs clear.    Has a GJ tube in place, receives all feeds through the J tube and a couple of his  acid blocker medication through the G tube. They have experimented with changing schedule and rate of tube feeds without any significant improvement and if go too slow risk clogging the tube. Do have the HOB elevated at the start of the night but often by the end of the night patient has moved enough to be lying flat. Have tried a couple medications for secretions, glycopyrrolate made him very sleepy, scopolamine helped but his skin reacted to the patches. Patient's mother reports she would like to get to point where he could eat again as she thinks he would enjoy it. She does not accept idea that Keyon will not get back to where he was prior to 12/2016 admission where suffered seizures and cardiac arrest. They have worked a few times with a Speech therapist in the past. RN does not think Keyon would really cooperate with consistent Speech therapy.     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      Pneumonia      Seizures (H)     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     Septic shock (H)      Spastic hemiplegia affecting dominant side (H)     related to wil injury     Thyroid disease      Tracheostomy care (H)      Traumatic brain injury (H) 1989     Unspecified cerebral artery occlusion with cerebral infarction 1989     UTI (urinary tract infection)      Ventricular fibrillation (H)      Ventricular tachyarrhythmia (H)      Past Surgical History:  Past Surgical History:   Procedure Laterality Date     ENDOSCOPIC ULTRASOUND UPPER GASTROINTESTINAL TRACT  (GI) N/A 1/30/2017    Procedure: ENDOSCOPIC ULTRASOUND, ESOPHAGOSCOPY / UPPER GASTROINTESTINAL TRACT (GI);  Surgeon: Jus Montana MD;  Location:  OR     ENDOSCOPIC ULTRASOUND, ESOPHAGOSCOPY, GASTROSCOPY, DUODENOSCOPY (EGD), NECROSECTOMY N/A 2/7/2017    Procedure: ENDOSCOPIC ULTRASOUND, ESOPHAGOSCOPY, GASTROSCOPY, DUODENOSCOPY (EGD), NECROSECTOMY;  Surgeon: Jack Marcus MD;  Location:  OR     ESOPHAGOSCOPY, GASTROSCOPY, DUODENOSCOPY (EGD), COMBINED  3/13/2014    Procedure: COMBINED ESOPHAGOSCOPY, GASTROSCOPY, DUODENOSCOPY (EGD), BIOPSY SINGLE OR MULTIPLE;  gastroscopy;  Surgeon: Digna Rhodes MD;  Location: [facility]     ESOPHAGOSCOPY, GASTROSCOPY, DUODENOSCOPY (EGD), COMBINED N/A 12/6/2016    Procedure: COMBINED ESOPHAGOSCOPY, GASTROSCOPY, DUODENOSCOPY (EGD);  Surgeon: Digna Rhodes MD;  Location: [facility]     ESOPHAGOSCOPY, GASTROSCOPY, DUODENOSCOPY (EGD), COMBINED N/A 2/7/2017    Procedure: COMBINED ENDOSCOPIC ULTRASOUND, ESOPHAGOSCOPY, GASTROSCOPY, DUODENOSCOPY (EGD), FINE NEEDLE ASPIRATE/BIOPSY;  Surgeon: Too Thakur MD;  Location:  OR     HEAD & NECK SURGERY      reconstructive facial surgery following accident in 1989     IR FOLLOW UP VISIT INPATIENT  2/20/2019     IR GASTRO JEJUNOSTOMY TUBE CHANGE  12/20/2018     IR GASTRO JEJUNOSTOMY TUBE CHANGE  2/4/2019     IR GASTRO JEJUNOSTOMY TUBE CHANGE  3/8/2019     IR GASTRO JEJUNOSTOMY TUBE CHANGE  8/7/2019     IR PICC EXCHANGE LEFT  8/15/2019     LAPAROSCOPIC APPENDECTOMY  7/30/2013    Procedure: LAPAROSCOPIC APPENDECTOMY;  LAPAROSCOPIC APPENDECTOMY;  Surgeon: Manish Pierce MD;  Location:  OR     LAPAROSCOPIC ASSISTED INSERTION TUBE GASTROTOMY N/A 9/7/2016    Procedure: LAPAROSCOPIC ASSISTED INSERTION TUBE GASTROSTOMY;  Surgeon: Manish Pierce MD;  Location:  OR     ORTHOPEDIC SURGERY      right hand repair     TRACHEOSTOMY N/A 9/3/2016    Procedure: TRACHEOSTOMY;  Surgeon: João Ortiz,  MD;  Location: [facility] OR     TRACHEOSTOMY N/A 2016    Procedure: TRACHEOSTOMY;  Surgeon: João Ortiz MD;  Location: [facility] OR     VASCULAR SURGERY       Past Social History:  Social History     Socioeconomic History     Marital status: Single     Spouse name: Not on file     Number of children: Not on file     Years of education: Not on file     Highest education level: Not on file   Occupational History     Not on file   Social Needs     Financial resource strain: Not on file     Food insecurity:     Worry: Not on file     Inability: Not on file     Transportation needs:     Medical: Not on file     Non-medical: Not on file   Tobacco Use     Smoking status: Former Smoker     Last attempt to quit: 1989     Years since quittin.4     Smokeless tobacco: Never Used   Substance and Sexual Activity     Alcohol use: No     Drug use: No     Sexual activity: Never   Lifestyle     Physical activity:     Days per week: Not on file     Minutes per session: Not on file     Stress: Not on file   Relationships     Social connections:     Talks on phone: Not on file     Gets together: Not on file     Attends [employer] service: Not on file     Active member of club or organization: Not on file     Attends meetings of clubs or organizations: Not on file     Relationship status: Not on file     Intimate partner violence:     Fear of current or ex partner: Not on file     Emotionally abused: Not on file     Physically abused: Not on file     Forced sexual activity: Not on file   Other Topics Concern     Parent/sibling w/ CABG, MI or angioplasty before 65F 55M? Not Asked   Social History Narrative     Not on file     Family History:  Family History   Problem Relation Age of Onset     Cancer Father      Medications:  Current Outpatient Medications   Medication     acetylcysteine (MUCOMYST) 20 % neb solution     albuterol (PROVENTIL) (5 MG/ML) 0.5% neb solution     amoxicillin-clavulanate (AUGMENTIN) 875-125 MG tablet      aspirin (ASA) 81 MG chewable tablet     bacitracin ointment     Bioflavonoid Products (VITAMIN C PLUS) 1000 MG TABS     Brivaracetam (BRIVIACT) 10 MG/ML solution     calcium carbonate 1250 (500 CA) MG/5ML SUSP suspension     carBAMazepine (TEGRETOL) 100 MG/5ML suspension     ferrous sulfate 220 (44 Fe) MG/5ML ELIX     Guar Gum (FIBER MODULAR, NUTRISOURCE FIBER,) packet     hydrocortisone (CORTEF) 5 MG tablet     hydrocortisone (CORTEF) 5 MG tablet     hydrocortisone 1 % CREA cream     ipratropium - albuterol 0.5 mg/2.5 mg/3 mL (DUONEB) 0.5-2.5 (3) MG/3ML neb solution     levothyroxine (SYNTHROID/LEVOTHROID) 150 MCG tablet     melatonin (MELATONIN) 1 MG/ML LIQD liquid     metoclopramide (REGLAN) 5 MG/5ML solution     miconazole (MICATIN) 2 % AERP powder     mupirocin (BACTROBAN) 2 % external ointment     order for DME     pantoprazole (PROTONIX) 2 mg/mL SUSP suspension     potassium & sodium phosphates (NEUTRA-PHOS) 280-160-250 MG Packet     Skin Protectants, Misc. (BALMEX SKIN PROTECTANT) OINT     testosterone cypionate (DEPOTESTOTERONE CYPIONATE) 200 MG/ML injection     vitamin D3 (CHOLECALCIFEROL) 2000 units (50 mcg) tablet     Scopolamine HBr POWD     No current facility-administered medications for this visit.      Allergies:  Allergies   Allergen Reactions     Dilantin [Phenytoin Sodium]      Valproic Acid      Toxicity c bone marrow suspension, elevated ammonia levels      Physical Exam:       /68   Pulse 68   Resp 17   Ht 1.829 m (6')   Wt 73.9 kg (163 lb)   SpO2 97%   BMI 22.11 kg/m      General: Sitting in wheelchair, NAD  HEENT: anicteric, moist mucosa  Chest: CTAB, no wheezing or crackles, sporadic wet cough  Cardiac: RRR no murmurs  Abdomen: Feeding tube site clean, no erythema   Extremities: No LE Edema  Neuro: Non-verbal, does follow simple commands and appear to understand some parts of conversation  Skin: no rash noted    Laboratory, imaging, and microbiologic data:    All laboratory  and imaging data reviewed, pertinent results discussed above.    CXR 9/27/2019  COMPARISON:  Chest x-ray from 9/24/2019     FINDINGS:   AP and lateral views of the chest.  The trachea is midline. The cardiomediastinal silhouette is stable.  The pulmonary vasculature are distinct,.   The included osseous thorax, soft tissues and upper abdomen and are  within normal limits.   Unchanged patchy opacity of the mid lungs, worse on the right.  Improved bibasilar atelectasis.                                                                      IMPRESSION: Unchanged patchy opacities in the midlungs representing  changes of chronic aspiration, worse on the right. No new opacities  indicating acute aspiration.  Most Recent [facility] Pulmonary Function Testing    FVC-Pred   Date Value Ref Range Status   09/27/2019 5.09 L      FVC-Pre   Date Value Ref Range Status   09/27/2019 0.90 L      FVC-%Pred-Pre   Date Value Ref Range Status   09/27/2019 17 %      FEV1-Pre   Date Value Ref Range Status   09/27/2019 0.84 L      FEV1-%Pred-Pre   Date Value Ref Range Status   09/27/2019 21 %      FEV1FVC-Pred   Date Value Ref Range Status   09/27/2019 78 %      FEV1FVC-Pre   Date Value Ref Range Status   09/27/2019 94 %      No results found for: 20029  FEFMax-Pred   Date Value Ref Range Status   09/27/2019 9.89 L/sec      FEFMax-Pre   Date Value Ref Range Status   09/27/2019 1.34 L/sec      FEFMax-%Pred-Pre   Date Value Ref Range Status   09/27/2019 13 %      ExpTime-Pre   Date Value Ref Range Status   09/27/2019 1.83 sec      FIFMax-Pre   Date Value Ref Range Status   09/27/2019 1.64 L/sec      FEV1FEV6-Pred   Date Value Ref Range Status   09/27/2019 79 %      FEV1FEV6-Pre   Date Value Ref Range Status   09/27/2019 94 %      No results found for: 20055

## 2019-09-30 ENCOUNTER — TELEPHONE (OUTPATIENT)
Dept: PULMONOLOGY | Facility: CLINIC | Age: 59
End: 2019-09-30

## 2019-09-30 DIAGNOSIS — J69.0 ASPIRATION PNEUMONIA OF RIGHT LOWER LOBE, UNSPECIFIED ASPIRATION PNEUMONIA TYPE (H): ICD-10-CM

## 2019-09-30 RX ORDER — SCOPOLAMINE HYDROBROMIDE
0.4 POWDER (GRAM) MISCELLANEOUS 3 TIMES DAILY PRN
Qty: 5 G | Refills: 11 | Status: SHIPPED | OUTPATIENT
Start: 2019-09-30 | End: 2019-10-07

## 2019-09-30 NOTE — TELEPHONE ENCOUNTER
Reviewed with Dr. Bermudez.  Goal is to provide pt with low dose Scopolamine via J-tube, 30 day supply with 11 refills.  Contacted [facility] pharmacist to discuss.  Pharmacy will compound 0.4mg capsules that can be mixed with liquid prior to J-tube administration.

## 2019-09-30 NOTE — TELEPHONE ENCOUNTER
[facility] Call Center    Phone Message    May a detailed message be left on voicemail: yes    Reason for Call: Other: pharmacy calling regarding Scopolamine, they need to knwo how the pt will be receiving this and also regarding the quantity, please call to discuss further and answer questions     Action Taken: Message routed to:  [facility]

## 2019-09-30 NOTE — TELEPHONE ENCOUNTER
Scopolamine sent to  specialty pharmacy.  Pt's Mom, Savannah notified that she will be contacted by pharmacy to arrange delivery.

## 2019-10-01 LAB
EXPTIME-PRE: 1.83 SEC
FEF2575-%PRED-PRE: 33 %
FEF2575-PRE: 1.11 L/SEC
FEF2575-PRED: 3.32 L/SEC
FEFMAX-%PRED-PRE: 13 %
FEFMAX-PRE: 1.34 L/SEC
FEFMAX-PRED: 9.89 L/SEC
FEV1-%PRED-PRE: 21 %
FEV1-PRE: 0.84 L
FEV1FEV6-PRE: 94 %
FEV1FEV6-PRED: 79 %
FEV1FVC-PRE: 94 %
FEV1FVC-PRED: 78 %
FIFMAX-PRE: 1.64 L/SEC
FVC-%PRED-PRE: 17 %
FVC-PRE: 0.9 L
FVC-PRED: 5.09 L

## 2019-10-07 ENCOUNTER — TELEPHONE (OUTPATIENT)
Dept: PULMONOLOGY | Facility: CLINIC | Age: 59
End: 2019-10-07

## 2019-10-07 ENCOUNTER — HOSPITAL ENCOUNTER (INPATIENT)
Facility: CLINIC | Age: 59
LOS: 3 days | Discharge: HOME HEALTH | DRG: 178 | End: 2019-10-10
Attending: EMERGENCY MEDICINE | Admitting: INTERNAL MEDICINE
Payer: MEDICARE

## 2019-10-07 ENCOUNTER — APPOINTMENT (OUTPATIENT)
Dept: GENERAL RADIOLOGY | Facility: CLINIC | Age: 59
DRG: 178 | End: 2019-10-07
Attending: EMERGENCY MEDICINE
Payer: MEDICARE

## 2019-10-07 DIAGNOSIS — J18.9 PNEUMONIA OF RIGHT LOWER LOBE DUE TO INFECTIOUS ORGANISM: ICD-10-CM

## 2019-10-07 DIAGNOSIS — T17.908A: Primary | ICD-10-CM

## 2019-10-07 LAB
ALBUMIN SERPL-MCNC: 2.7 G/DL (ref 3.4–5)
ALBUMIN UR-MCNC: 10 MG/DL
ALP SERPL-CCNC: 80 U/L (ref 40–150)
ALT SERPL W P-5'-P-CCNC: 30 U/L (ref 0–70)
AMORPH CRY #/AREA URNS HPF: ABNORMAL /HPF
ANION GAP SERPL CALCULATED.3IONS-SCNC: 6 MMOL/L (ref 3–14)
APPEARANCE UR: CLEAR
AST SERPL W P-5'-P-CCNC: 20 U/L (ref 0–45)
BASOPHILS # BLD AUTO: 0.1 10E9/L (ref 0–0.2)
BASOPHILS NFR BLD AUTO: 0.4 %
BILIRUB SERPL-MCNC: 0.2 MG/DL (ref 0.2–1.3)
BILIRUB UR QL STRIP: NEGATIVE
BUN SERPL-MCNC: 14 MG/DL (ref 7–30)
CALCIUM SERPL-MCNC: 8 MG/DL (ref 8.5–10.1)
CHLORIDE SERPL-SCNC: 95 MMOL/L (ref 94–109)
CO2 BLDCOV-SCNC: 27 MMOL/L (ref 21–28)
CO2 SERPL-SCNC: 28 MMOL/L (ref 20–32)
COLOR UR AUTO: YELLOW
CREAT SERPL-MCNC: 0.66 MG/DL (ref 0.66–1.25)
DIFFERENTIAL METHOD BLD: ABNORMAL
EOSINOPHIL # BLD AUTO: 0.3 10E9/L (ref 0–0.7)
EOSINOPHIL NFR BLD AUTO: 2.2 %
ERYTHROCYTE [DISTWIDTH] IN BLOOD BY AUTOMATED COUNT: 13.6 % (ref 10–15)
GFR SERPL CREATININE-BSD FRML MDRD: >90 ML/MIN/{1.73_M2}
GLUCOSE SERPL-MCNC: 115 MG/DL (ref 70–99)
GLUCOSE UR STRIP-MCNC: 300 MG/DL
HCT VFR BLD AUTO: 31.4 % (ref 40–53)
HGB BLD-MCNC: 10.5 G/DL (ref 13.3–17.7)
HGB UR QL STRIP: NEGATIVE
IMM GRANULOCYTES # BLD: 0 10E9/L (ref 0–0.4)
IMM GRANULOCYTES NFR BLD: 0.1 %
KETONES UR STRIP-MCNC: NEGATIVE MG/DL
LACTATE BLD-SCNC: 1.4 MMOL/L (ref 0.7–2)
LACTATE BLD-SCNC: 1.7 MMOL/L (ref 0.7–2.1)
LEUKOCYTE ESTERASE UR QL STRIP: NEGATIVE
LYMPHOCYTES # BLD AUTO: 1.7 10E9/L (ref 0.8–5.3)
LYMPHOCYTES NFR BLD AUTO: 12.2 %
MCH RBC QN AUTO: 27.9 PG (ref 26.5–33)
MCHC RBC AUTO-ENTMCNC: 33.4 G/DL (ref 31.5–36.5)
MCV RBC AUTO: 83 FL (ref 78–100)
MONOCYTES # BLD AUTO: 1.2 10E9/L (ref 0–1.3)
MONOCYTES NFR BLD AUTO: 8.7 %
NEUTROPHILS # BLD AUTO: 10.5 10E9/L (ref 1.6–8.3)
NEUTROPHILS NFR BLD AUTO: 76.4 %
NITRATE UR QL: NEGATIVE
NRBC # BLD AUTO: 0 10*3/UL
NRBC BLD AUTO-RTO: 0 /100
PCO2 BLDV: 46 MM HG (ref 40–50)
PH BLDV: 7.38 PH (ref 7.32–7.43)
PH UR STRIP: 8.5 PH (ref 5–7)
PLATELET # BLD AUTO: 212 10E9/L (ref 150–450)
PO2 BLDV: 29 MM HG (ref 25–47)
POTASSIUM SERPL-SCNC: 4.1 MMOL/L (ref 3.4–5.3)
PROCALCITONIN SERPL-MCNC: <0.05 NG/ML
PROLACTIN SERPL-MCNC: <1 UG/L (ref 2–18)
PROT SERPL-MCNC: 6.9 G/DL (ref 6.8–8.8)
RBC # BLD AUTO: 3.77 10E12/L (ref 4.4–5.9)
RBC #/AREA URNS AUTO: <1 /HPF (ref 0–2)
SAO2 % BLDV FROM PO2: 52 %
SODIUM SERPL-SCNC: 129 MMOL/L (ref 133–144)
SOURCE: ABNORMAL
SP GR UR STRIP: 1.02 (ref 1–1.03)
UROBILINOGEN UR STRIP-MCNC: NORMAL MG/DL (ref 0–2)
WBC # BLD AUTO: 13.8 10E9/L (ref 4–11)
WBC #/AREA URNS AUTO: <1 /HPF (ref 0–5)

## 2019-10-07 PROCEDURE — 99223 1ST HOSP IP/OBS HIGH 75: CPT | Mod: AI | Performed by: INTERNAL MEDICINE

## 2019-10-07 PROCEDURE — 25000132 ZZH RX MED GY IP 250 OP 250 PS 637: Mod: GY | Performed by: EMERGENCY MEDICINE

## 2019-10-07 PROCEDURE — 80053 COMPREHEN METABOLIC PANEL: CPT | Performed by: EMERGENCY MEDICINE

## 2019-10-07 PROCEDURE — 87040 BLOOD CULTURE FOR BACTERIA: CPT | Performed by: EMERGENCY MEDICINE

## 2019-10-07 PROCEDURE — 71046 X-RAY EXAM CHEST 2 VIEWS: CPT

## 2019-10-07 PROCEDURE — 87086 URINE CULTURE/COLONY COUNT: CPT | Performed by: EMERGENCY MEDICINE

## 2019-10-07 PROCEDURE — 12000000 ZZH R&B MED SURG/OB

## 2019-10-07 PROCEDURE — 83605 ASSAY OF LACTIC ACID: CPT

## 2019-10-07 PROCEDURE — 81001 URINALYSIS AUTO W/SCOPE: CPT | Performed by: EMERGENCY MEDICINE

## 2019-10-07 PROCEDURE — 96365 THER/PROPH/DIAG IV INF INIT: CPT

## 2019-10-07 PROCEDURE — 96361 HYDRATE IV INFUSION ADD-ON: CPT

## 2019-10-07 PROCEDURE — 84145 PROCALCITONIN (PCT): CPT | Performed by: EMERGENCY MEDICINE

## 2019-10-07 PROCEDURE — 85025 COMPLETE CBC W/AUTO DIFF WBC: CPT | Performed by: EMERGENCY MEDICINE

## 2019-10-07 PROCEDURE — 83605 ASSAY OF LACTIC ACID: CPT | Performed by: EMERGENCY MEDICINE

## 2019-10-07 PROCEDURE — 96375 TX/PRO/DX INJ NEW DRUG ADDON: CPT

## 2019-10-07 PROCEDURE — 84146 ASSAY OF PROLACTIN: CPT | Performed by: EMERGENCY MEDICINE

## 2019-10-07 PROCEDURE — 82803 BLOOD GASES ANY COMBINATION: CPT

## 2019-10-07 PROCEDURE — 99285 EMERGENCY DEPT VISIT HI MDM: CPT | Mod: 25

## 2019-10-07 PROCEDURE — 25000128 H RX IP 250 OP 636: Performed by: EMERGENCY MEDICINE

## 2019-10-07 RX ORDER — VANCOMYCIN HYDROCHLORIDE 1 G/200ML
1000 INJECTION, SOLUTION INTRAVENOUS ONCE
Status: COMPLETED | OUTPATIENT
Start: 2019-10-07 | End: 2019-10-07

## 2019-10-07 RX ORDER — ONDANSETRON 2 MG/ML
4 INJECTION INTRAMUSCULAR; INTRAVENOUS EVERY 6 HOURS PRN
Status: DISCONTINUED | OUTPATIENT
Start: 2019-10-07 | End: 2019-10-10 | Stop reason: HOSPADM

## 2019-10-07 RX ORDER — CHOLECALCIFEROL (VITAMIN D3) 50 MCG
2000 TABLET ORAL DAILY
Status: DISCONTINUED | OUTPATIENT
Start: 2019-10-08 | End: 2019-10-10 | Stop reason: HOSPADM

## 2019-10-07 RX ORDER — POTASSIUM CHLORIDE 29.8 MG/ML
20 INJECTION INTRAVENOUS
Status: DISCONTINUED | OUTPATIENT
Start: 2019-10-07 | End: 2019-10-10 | Stop reason: HOSPADM

## 2019-10-07 RX ORDER — METOCLOPRAMIDE HYDROCHLORIDE 5 MG/5ML
5 SOLUTION ORAL 2 TIMES DAILY
Status: DISCONTINUED | OUTPATIENT
Start: 2019-10-07 | End: 2019-10-10 | Stop reason: HOSPADM

## 2019-10-07 RX ORDER — IPRATROPIUM BROMIDE AND ALBUTEROL SULFATE 2.5; .5 MG/3ML; MG/3ML
1 SOLUTION RESPIRATORY (INHALATION) EVERY 4 HOURS PRN
Status: DISCONTINUED | OUTPATIENT
Start: 2019-10-07 | End: 2019-10-10 | Stop reason: HOSPADM

## 2019-10-07 RX ORDER — HYDROCORTISONE 20 MG/1
20 TABLET ORAL EVERY MORNING
Status: DISCONTINUED | OUTPATIENT
Start: 2019-10-08 | End: 2019-10-10 | Stop reason: HOSPADM

## 2019-10-07 RX ORDER — LANOLIN ALCOHOL/MO/W.PET/CERES
6 CREAM (GRAM) TOPICAL AT BEDTIME
Status: DISCONTINUED | OUTPATIENT
Start: 2019-10-07 | End: 2019-10-10 | Stop reason: HOSPADM

## 2019-10-07 RX ORDER — ACETYLCYSTEINE 200 MG/ML
2 SOLUTION ORAL; RESPIRATORY (INHALATION) EVERY 6 HOURS
Status: DISCONTINUED | OUTPATIENT
Start: 2019-10-07 | End: 2019-10-10 | Stop reason: HOSPADM

## 2019-10-07 RX ORDER — PIPERACILLIN SODIUM, TAZOBACTAM SODIUM 3; .375 G/15ML; G/15ML
3.38 INJECTION, POWDER, LYOPHILIZED, FOR SOLUTION INTRAVENOUS ONCE
Status: COMPLETED | OUTPATIENT
Start: 2019-10-07 | End: 2019-10-07

## 2019-10-07 RX ORDER — SCOPOLAMINE HYDROBROMIDE
0.4 POWDER (GRAM) MISCELLANEOUS 3 TIMES DAILY
Status: DISCONTINUED | OUTPATIENT
Start: 2019-10-08 | End: 2019-10-10 | Stop reason: HOSPADM

## 2019-10-07 RX ORDER — KETOROLAC TROMETHAMINE 15 MG/ML
15 INJECTION, SOLUTION INTRAMUSCULAR; INTRAVENOUS ONCE
Status: COMPLETED | OUTPATIENT
Start: 2019-10-07 | End: 2019-10-07

## 2019-10-07 RX ORDER — ASPIRIN 81 MG/1
81 TABLET, CHEWABLE ORAL DAILY
Status: DISCONTINUED | OUTPATIENT
Start: 2019-10-08 | End: 2019-10-10 | Stop reason: HOSPADM

## 2019-10-07 RX ORDER — NALOXONE HYDROCHLORIDE 0.4 MG/ML
.1-.4 INJECTION, SOLUTION INTRAMUSCULAR; INTRAVENOUS; SUBCUTANEOUS
Status: DISCONTINUED | OUTPATIENT
Start: 2019-10-07 | End: 2019-10-10 | Stop reason: HOSPADM

## 2019-10-07 RX ORDER — SCOPOLAMINE HYDROBROMIDE
0.4 POWDER (GRAM) MISCELLANEOUS 3 TIMES DAILY
Status: ON HOLD | COMMUNITY
End: 2019-10-29

## 2019-10-07 RX ORDER — LIDOCAINE 40 MG/G
CREAM TOPICAL
Status: DISCONTINUED | OUTPATIENT
Start: 2019-10-07 | End: 2019-10-10 | Stop reason: HOSPADM

## 2019-10-07 RX ORDER — MAGNESIUM SULFATE HEPTAHYDRATE 40 MG/ML
4 INJECTION, SOLUTION INTRAVENOUS EVERY 4 HOURS PRN
Status: DISCONTINUED | OUTPATIENT
Start: 2019-10-07 | End: 2019-10-10 | Stop reason: HOSPADM

## 2019-10-07 RX ORDER — CARBAMAZEPINE 100 MG/5ML
150 SUSPENSION ORAL EVERY 6 HOURS
Status: DISCONTINUED | OUTPATIENT
Start: 2019-10-08 | End: 2019-10-10 | Stop reason: HOSPADM

## 2019-10-07 RX ORDER — PROCHLORPERAZINE 25 MG
25 SUPPOSITORY, RECTAL RECTAL EVERY 12 HOURS PRN
Status: DISCONTINUED | OUTPATIENT
Start: 2019-10-07 | End: 2019-10-10 | Stop reason: HOSPADM

## 2019-10-07 RX ORDER — ALBUTEROL SULFATE 0.83 MG/ML
2.5 SOLUTION RESPIRATORY (INHALATION)
Status: DISCONTINUED | OUTPATIENT
Start: 2019-10-07 | End: 2019-10-10 | Stop reason: HOSPADM

## 2019-10-07 RX ORDER — POTASSIUM CL/LIDO/0.9 % NACL 10MEQ/0.1L
10 INTRAVENOUS SOLUTION, PIGGYBACK (ML) INTRAVENOUS
Status: DISCONTINUED | OUTPATIENT
Start: 2019-10-07 | End: 2019-10-10 | Stop reason: HOSPADM

## 2019-10-07 RX ORDER — POTASSIUM CHLORIDE 7.45 MG/ML
10 INJECTION INTRAVENOUS
Status: DISCONTINUED | OUTPATIENT
Start: 2019-10-07 | End: 2019-10-10 | Stop reason: HOSPADM

## 2019-10-07 RX ORDER — POTASSIUM CHLORIDE 1500 MG/1
20-40 TABLET, EXTENDED RELEASE ORAL
Status: DISCONTINUED | OUTPATIENT
Start: 2019-10-07 | End: 2019-10-10 | Stop reason: HOSPADM

## 2019-10-07 RX ORDER — MICONAZOLE NITRATE 20 MG/G
CREAM TOPICAL 2 TIMES DAILY
Status: DISCONTINUED | OUTPATIENT
Start: 2019-10-07 | End: 2019-10-08

## 2019-10-07 RX ORDER — PROCHLORPERAZINE MALEATE 5 MG
10 TABLET ORAL EVERY 6 HOURS PRN
Status: DISCONTINUED | OUTPATIENT
Start: 2019-10-07 | End: 2019-10-10 | Stop reason: HOSPADM

## 2019-10-07 RX ORDER — POTASSIUM CHLORIDE 1.5 G/1.58G
20-40 POWDER, FOR SOLUTION ORAL
Status: DISCONTINUED | OUTPATIENT
Start: 2019-10-07 | End: 2019-10-10 | Stop reason: HOSPADM

## 2019-10-07 RX ORDER — HYDROCORTISONE 10 MG/1
10 TABLET ORAL
Status: DISCONTINUED | OUTPATIENT
Start: 2019-10-08 | End: 2019-10-10 | Stop reason: HOSPADM

## 2019-10-07 RX ORDER — ONDANSETRON 4 MG/1
4 TABLET, ORALLY DISINTEGRATING ORAL EVERY 6 HOURS PRN
Status: DISCONTINUED | OUTPATIENT
Start: 2019-10-07 | End: 2019-10-10 | Stop reason: HOSPADM

## 2019-10-07 RX ORDER — GUAR GUM
1 PACKET (EA) ORAL DAILY
Status: DISCONTINUED | OUTPATIENT
Start: 2019-10-08 | End: 2019-10-10 | Stop reason: HOSPADM

## 2019-10-07 RX ORDER — LEVOTHYROXINE SODIUM 150 UG/1
150 TABLET ORAL EVERY MORNING
Status: DISCONTINUED | OUTPATIENT
Start: 2019-10-08 | End: 2019-10-10 | Stop reason: HOSPADM

## 2019-10-07 RX ADMIN — SODIUM CHLORIDE 1000 ML: 9 INJECTION, SOLUTION INTRAVENOUS at 20:07

## 2019-10-07 RX ADMIN — VANCOMYCIN HYDROCHLORIDE 1000 MG: 1 INJECTION, SOLUTION INTRAVENOUS at 21:56

## 2019-10-07 RX ADMIN — PIPERACILLIN SODIUM AND TAZOBACTAM SODIUM 3.38 G: 3; .375 INJECTION, POWDER, LYOPHILIZED, FOR SOLUTION INTRAVENOUS at 20:52

## 2019-10-07 RX ADMIN — KETOROLAC TROMETHAMINE 15 MG: 15 INJECTION, SOLUTION INTRAMUSCULAR; INTRAVENOUS at 20:44

## 2019-10-07 RX ADMIN — BRIVARACETAM 100 MG: 10 SOLUTION ORAL at 21:56

## 2019-10-07 ASSESSMENT — ENCOUNTER SYMPTOMS
FEVER: 1
COUGH: 1

## 2019-10-07 NOTE — TELEPHONE ENCOUNTER
Contacted by Seun Aldrich, pt's RN, with request to increase scoplamine dosing.  Pt is currently taking 0.4mg at 8am, 4 pm and midnight.  Seun would like to give 0.8mg at 8am, 0.8mg at noon and 0.4 at midnight.  States current dosing not adequately controlling secretions, particularly in the afternoons when he gets tired.  Message to MD to advise. Seun can be contacted at [phone], OK to leave .

## 2019-10-08 LAB
ANION GAP SERPL CALCULATED.3IONS-SCNC: 5 MMOL/L (ref 3–14)
BUN SERPL-MCNC: 12 MG/DL (ref 7–30)
CALCIUM SERPL-MCNC: 7.4 MG/DL (ref 8.5–10.1)
CHLORIDE SERPL-SCNC: 102 MMOL/L (ref 94–109)
CO2 SERPL-SCNC: 25 MMOL/L (ref 20–32)
CREAT SERPL-MCNC: 0.73 MG/DL (ref 0.66–1.25)
ERYTHROCYTE [DISTWIDTH] IN BLOOD BY AUTOMATED COUNT: 13.7 % (ref 10–15)
GFR SERPL CREATININE-BSD FRML MDRD: >90 ML/MIN/{1.73_M2}
GLUCOSE BLDC GLUCOMTR-MCNC: 95 MG/DL (ref 70–99)
GLUCOSE SERPL-MCNC: 105 MG/DL (ref 70–99)
HCT VFR BLD AUTO: 29.4 % (ref 40–53)
HGB BLD-MCNC: 9.7 G/DL (ref 13.3–17.7)
MCH RBC QN AUTO: 27.5 PG (ref 26.5–33)
MCHC RBC AUTO-ENTMCNC: 33 G/DL (ref 31.5–36.5)
MCV RBC AUTO: 83 FL (ref 78–100)
PLATELET # BLD AUTO: 175 10E9/L (ref 150–450)
POTASSIUM SERPL-SCNC: 3.9 MMOL/L (ref 3.4–5.3)
PROCALCITONIN SERPL-MCNC: 0.1 NG/ML
RBC # BLD AUTO: 3.53 10E12/L (ref 4.4–5.9)
SODIUM SERPL-SCNC: 132 MMOL/L (ref 133–144)
WBC # BLD AUTO: 8.5 10E9/L (ref 4–11)

## 2019-10-08 PROCEDURE — 40000901 ZZH STATISTIC WOC PT EDUCATION, 0-15 MIN

## 2019-10-08 PROCEDURE — 94640 AIRWAY INHALATION TREATMENT: CPT | Mod: 76

## 2019-10-08 PROCEDURE — 25000132 ZZH RX MED GY IP 250 OP 250 PS 637: Mod: GY | Performed by: INTERNAL MEDICINE

## 2019-10-08 PROCEDURE — 40000275 ZZH STATISTIC RCP TIME EA 10 MIN

## 2019-10-08 PROCEDURE — 00000146 ZZHCL STATISTIC GLUCOSE BY METER IP

## 2019-10-08 PROCEDURE — 25000125 ZZHC RX 250: Performed by: INTERNAL MEDICINE

## 2019-10-08 PROCEDURE — 94640 AIRWAY INHALATION TREATMENT: CPT

## 2019-10-08 PROCEDURE — 36415 COLL VENOUS BLD VENIPUNCTURE: CPT | Performed by: INTERNAL MEDICINE

## 2019-10-08 PROCEDURE — 85027 COMPLETE CBC AUTOMATED: CPT | Performed by: INTERNAL MEDICINE

## 2019-10-08 PROCEDURE — 99232 SBSQ HOSP IP/OBS MODERATE 35: CPT | Performed by: INTERNAL MEDICINE

## 2019-10-08 PROCEDURE — 25800030 ZZH RX IP 258 OP 636: Performed by: INTERNAL MEDICINE

## 2019-10-08 PROCEDURE — 84145 PROCALCITONIN (PCT): CPT | Performed by: INTERNAL MEDICINE

## 2019-10-08 PROCEDURE — 27210429 ZZH NUTRITION PRODUCT INTERMEDIATE LITER

## 2019-10-08 PROCEDURE — 12000000 ZZH R&B MED SURG/OB

## 2019-10-08 PROCEDURE — 80048 BASIC METABOLIC PNL TOTAL CA: CPT | Performed by: INTERNAL MEDICINE

## 2019-10-08 PROCEDURE — G0463 HOSPITAL OUTPT CLINIC VISIT: HCPCS

## 2019-10-08 RX ORDER — MICONAZOLE NITRATE 20 MG/G
CREAM TOPICAL 3 TIMES DAILY PRN
Status: DISCONTINUED | OUTPATIENT
Start: 2019-10-08 | End: 2019-10-10 | Stop reason: HOSPADM

## 2019-10-08 RX ORDER — AMINO AC/PROTEIN HYDR/WHEY PRO 10G-100/30
1 LIQUID (ML) ORAL DAILY
Status: DISCONTINUED | OUTPATIENT
Start: 2019-10-08 | End: 2019-10-10 | Stop reason: HOSPADM

## 2019-10-08 RX ORDER — MICONAZOLE NITRATE 20 MG/G
CREAM TOPICAL 3 TIMES DAILY
Status: DISCONTINUED | OUTPATIENT
Start: 2019-10-08 | End: 2019-10-10 | Stop reason: HOSPADM

## 2019-10-08 RX ADMIN — ALBUTEROL SULFATE 2.5 MG: 2.5 SOLUTION RESPIRATORY (INHALATION) at 23:33

## 2019-10-08 RX ADMIN — DEXTROSE AND SODIUM CHLORIDE: 5; 900 INJECTION, SOLUTION INTRAVENOUS at 13:00

## 2019-10-08 RX ADMIN — ALBUTEROL SULFATE 2.5 MG: 2.5 SOLUTION RESPIRATORY (INHALATION) at 18:36

## 2019-10-08 RX ADMIN — MICONAZOLE NITRATE: 20 CREAM TOPICAL at 09:56

## 2019-10-08 RX ADMIN — HYDROCORTISONE 10 MG: 10 TABLET ORAL at 15:06

## 2019-10-08 RX ADMIN — CARBAMAZEPINE 150 MG: 100 SUSPENSION ORAL at 13:12

## 2019-10-08 RX ADMIN — ALBUTEROL SULFATE 2.5 MG: 2.5 SOLUTION RESPIRATORY (INHALATION) at 12:41

## 2019-10-08 RX ADMIN — BRIVARACETAM 100 MG: 10 SOLUTION ORAL at 20:18

## 2019-10-08 RX ADMIN — MICONAZOLE NITRATE: 20 CREAM TOPICAL at 22:12

## 2019-10-08 RX ADMIN — ALBUTEROL SULFATE 2.5 MG: 2.5 SOLUTION RESPIRATORY (INHALATION) at 14:56

## 2019-10-08 RX ADMIN — ACETYLCYSTEINE 2 ML: 200 SOLUTION ORAL; RESPIRATORY (INHALATION) at 18:36

## 2019-10-08 RX ADMIN — MICONAZOLE NITRATE: 20 CREAM TOPICAL at 15:06

## 2019-10-08 RX ADMIN — ACETYLCYSTEINE 2 ML: 200 SOLUTION ORAL; RESPIRATORY (INHALATION) at 12:40

## 2019-10-08 RX ADMIN — Medication 1 PACKET: at 10:27

## 2019-10-08 RX ADMIN — CARBAMAZEPINE 150 MG: 100 SUSPENSION ORAL at 06:16

## 2019-10-08 RX ADMIN — Medication 0.4 MG: at 22:10

## 2019-10-08 RX ADMIN — DEXTROSE AND SODIUM CHLORIDE: 5; 900 INJECTION, SOLUTION INTRAVENOUS at 23:52

## 2019-10-08 RX ADMIN — ALBUTEROL SULFATE 2.5 MG: 2.5 SOLUTION RESPIRATORY (INHALATION) at 01:33

## 2019-10-08 RX ADMIN — ASPIRIN 81 MG 81 MG: 81 TABLET ORAL at 09:40

## 2019-10-08 RX ADMIN — MICONAZOLE NITRATE: 20 CREAM TOPICAL at 06:15

## 2019-10-08 RX ADMIN — MELATONIN 6 MG: 3 TAB ORAL at 21:50

## 2019-10-08 RX ADMIN — MICONAZOLE NITRATE: 20 POWDER TOPICAL at 15:06

## 2019-10-08 RX ADMIN — MINERAL SUPPLEMENT IRON 300 MG / 5 ML STRENGTH LIQUID 100 PER BOX UNFLAVORED 220 MG: at 10:31

## 2019-10-08 RX ADMIN — CARBAMAZEPINE 150 MG: 100 SUSPENSION ORAL at 18:17

## 2019-10-08 RX ADMIN — METOCLOPRAMIDE 5 MG: 5 SOLUTION ORAL at 09:42

## 2019-10-08 RX ADMIN — POTASSIUM & SODIUM PHOSPHATES POWDER PACK 280-160-250 MG 1 PACKET: 280-160-250 PACK at 09:41

## 2019-10-08 RX ADMIN — CHOLECALCIFEROL TAB 50 MCG (2000 UNIT) 2000 UNITS: 50 TAB at 09:40

## 2019-10-08 RX ADMIN — MELATONIN 6 MG: 3 TAB ORAL at 00:34

## 2019-10-08 RX ADMIN — Medication 0.4 MG: at 11:59

## 2019-10-08 RX ADMIN — Medication 0.4 MG: at 16:48

## 2019-10-08 RX ADMIN — DEXTROSE AND SODIUM CHLORIDE: 5; 900 INJECTION, SOLUTION INTRAVENOUS at 01:47

## 2019-10-08 RX ADMIN — BRIVARACETAM 100 MG: 10 SOLUTION ORAL at 09:41

## 2019-10-08 RX ADMIN — MICONAZOLE NITRATE: 20 POWDER TOPICAL at 22:12

## 2019-10-08 RX ADMIN — CARBAMAZEPINE 150 MG: 100 SUSPENSION ORAL at 00:34

## 2019-10-08 RX ADMIN — ACETYLCYSTEINE 2 ML: 200 SOLUTION ORAL; RESPIRATORY (INHALATION) at 23:33

## 2019-10-08 RX ADMIN — Medication 40 MG: at 09:42

## 2019-10-08 RX ADMIN — METOCLOPRAMIDE 5 MG: 5 SOLUTION ORAL at 20:14

## 2019-10-08 RX ADMIN — METOCLOPRAMIDE 5 MG: 5 SOLUTION ORAL at 01:47

## 2019-10-08 RX ADMIN — LEVOTHYROXINE SODIUM 150 MCG: 150 TABLET ORAL at 09:41

## 2019-10-08 RX ADMIN — HYDROCORTISONE 20 MG: 20 TABLET ORAL at 09:40

## 2019-10-08 ASSESSMENT — ACTIVITIES OF DAILY LIVING (ADL)
COGNITION: 2 - DIFFICULTY WITH ORGANIZING THOUGHTS
ADLS_ACUITY_SCORE: 41
ADLS_ACUITY_SCORE: 41
BATHING: 4-->COMPLETELY DEPENDENT
ADLS_ACUITY_SCORE: 41
ADLS_ACUITY_SCORE: 39
FALL_HISTORY_WITHIN_LAST_SIX_MONTHS: NO
DRESS: 4-->COMPLETELY DEPENDENT
RETIRED_EATING: 4-->COMPLETELY DEPENDENT
TRANSFERRING: 4-->COMPLETELY DEPENDENT
SWALLOWING: 2-->DIFFICULTY SWALLOWING LIQUIDS/FOODS
RETIRED_COMMUNICATION: 2-->DIFFICULTY SPEAKING (NOT RELATED TO LANGUAGE BARRIER)
AMBULATION: 4-->COMPLETELY DEPENDENT
TOILETING: 4-->COMPLETELY DEPENDENT
ADLS_ACUITY_SCORE: 45
ADLS_ACUITY_SCORE: 47

## 2019-10-08 NOTE — ED PROVIDER NOTES
History     Chief Complaint:  Fever      HPI   History limited by patient non-verbal status. Mom presented later.    Keyon Farias is a 59 year old male with a very complex medical history he has a TBI which resulted in aphasia and is bed bound, and tendency for aspiration pneumonia, and has a G tube who presents with fever. Per chart review, the patient was admitted on 9/21-9/24 for aspiration pneumonia with mild hyponatremia. Prior to that hospitalization he was hospitalized the week before for aspiration pneumonia and he was treated with vancomycin and zosyn. Interval Chest XR showed improvement and they discontinued his IV antibiotics and was sent home with 5 more days of Augmentin. The patient was brought in by EMS for fever of 102 today. Here, notes that he has a cough. Of note his doctor recently started on scopolamine powder to help dry his oral secretions.       Allergies:  Dilantin  Valproic     Medications:    Carbamazepine  Ferrous sulfate  Levothyroxine  Metoclopramide  Albuterol  Glycopyrrolate  Augmentin  Pantoprazole  Duoneb      Past Medical History:    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  DVT   Pneumonia  Seizures  Thyroid disease  Ventricular fibrillation   Ventricular tachyarrhythmia  Sepsis  Panhypopituitarism   Hyponatremia  Necrotizing pancreatitis  Encephalopathy  Aspiration pneumonia  SIRS (systemic inflammatory response syndrome)      Past Surgical History:    Necronectomy  Head and Neck surgery  G J Tube change x4  Orthopedic surgery  Tracheostomy x2  Vascular surgery    Family History:    Cancer    Social History:  Smoking status: Former smoker  Alcohol use: No  Drug use: No  PCP: Carlos Gomez  Marital Status:  Single [1]     Review of Systems   Unable to perform ROS: Patient nonverbal   Constitutional: Positive for fever.   Respiratory: Positive for cough.    All other systems reviewed and are negative.      Physical Exam     Patient Vitals for the past 24 hrs:   BP Temp Temp src Pulse Heart  Rate Resp SpO2   10/07/19 2100 110/64 -- -- 92 96 26 97 %   10/07/19 1909 113/71 101.4  F (38.6  C) Axillary 100 -- 26 97 %        Physical Exam    Physical Exam   Constitutional:  Patient nonverbal, smiling.   HENT:   Mouth/Throat:   Oropharynx is clear and moist.   Eyes:    Conjunctivae normal and EOM are normal. Pupils are equal, round, and reactive to light.   Neck:    Normal range of motion.   Cardiovascular: Normal rate, regular rhythm and normal heart sounds.  Exam reveals no gallop and no friction rub.  No murmur heard.  Pulmonary/Chest:  Effort normal and diminished breath sounds normal.    Abdominal:   Soft. Bowel sounds are normal.PEG tube site clean and intact.  Musculoskeletal:  Mild bilateral edema.   Neurological:   Patient is alert. Does not move lower extremities. Waves hands around  Skin:   Skin is warm and dry. Reported candidal infection    Psychiatric:   Patient seems very happy        Emergency Department Course     Imaging:  Radiology findings were communicated with the patient who voiced understanding of the findings.    Chest XR, PA & LAT:  Some patchy opacities at the right lung base may be  atelectasis or new airspace disease. Hypoinflated lungs with stable  mild elevation of the right hemidiaphragm. Normal cardiac silhouette.    Reading per radiology     Laboratory:  Laboratory findings were communicated with the patient who voiced understanding of the findings.    CBC: WBC 13.8(H), HGB 10.5(L),   CMP: (L), glucose 115(H), Calcium 8.0(L), Albumin 2.7(L) o/w WNL (Creatinine 0.66)  ISTAT gases lactate gabe POCT: pH: 7.38, PCO2: 46, PO2: 29, Bicarbonate: 27, O2 Sat: 52, Lactic acid: 1.7  Prolactin: pending    Lactic acid whole blood: 1.4    Blood Culture: pending    Procedures    Interventions:  2007 NS 1L IV Bolus   2044 Toradol 15 mg, IV  2052 Zosyn infusion, 3.375 g, IV  2100    Vanco 1000 mg IV      Emergency Department Course:   Nursing notes and vitals reviewed.    1934 IV  was inserted and blood was drawn for laboratory testing, results above.     1938 I performed an exam of the patient as documented above.     2018 The patient was sent for a Chest XR while in the emergency department, results above.      2110  I spoke to Dr. Villavicencio of the hospitalist service who accepts the patient for admission.     2114 I personally reviewed the results with the patient and answered all related questions prior to admission.    Impression & Plan      Medical Decision Making:  Keyon Farias is a 59 year old male with a very complex medical history he has a TBI which resulted in aphasia and is bed bound, and tendency for aspiration pneumonia, and has a G tube who presents with fever.  He has diminished bilaterally and has a significant Jorge Luis difficult time handling his secretions.  He does not allow his home health nurse to suction him so they have been trying scopolamine powder to dry up his secretions.  Chest x-ray here shows probable pneumonia on the right side certainly aspiration or healthcare associated pneumonia is the most likely probability so he was treated empirically with Zosyn and Vanco upon arrival due to his frequent history of this and being febrile.  He received IV fluids.  He is not hypoxic.  His heart rate came down with IV fluids.  His blood pressure is normal.  He does have a normal lactic however his white blood cell count is slightly elevated.  I did give him a dose of his seizure medications as he normally takes this at 9:00.  His mother does note that he has lipsmacking oftentimes preceded by his fever.  At this time I will admit him to hospital.      Diagnosis:    ICD-10-CM    1. Pneumonia of right lower lobe due to infectious organism (H) J18.1      Disposition:   The patient is admitted into the care of Dr. Villavicencio.     Scribe Disclosure:  Jossie DYKES, am serving as a scribe at 1938 on 10/7/2019 to document services personally performed by Courtney Frazier MD based on  my observations and the provider's statements to me.    EMERGENCY DEPARTMENT       Courtney Frazier MD  10/07/19 6975

## 2019-10-08 NOTE — H&P
[facility]    History and Physical - Hospitalist Service       Date of Admission:  10/7/2019    Assessment & Plan   Keyon Farias is a 59 year old male admitted on 10/7/2019. He presents in a typical fashion for his prior episodes of aspiration pneumonia versus pneumonitis with lipsmacking behavior and fevers.  Multiple recent admissions for the same.    Aspiration pneumonitis:  -Received IV vancomycin and Zosyn in the emergency department.  Additional antibiotics have been held as patient is not hypoxic, procalcitonin is undetectable.  Suspect aspiration pneumonitis as above  -Continue to monitor closely off of anti-infectives  -Oximetry, oxygen if needed  -Strict n.p.o., tube feedings only, meds through tube feeding only  -Resume scopolamine powder if available; patient with a rash to Scopolamine patch, though this was beneficial for patient in the past.  Mother will bring in powdered scopolamine in a.m. if not available through [facility]  -Head of bed greater than 30 degrees  -Suction at bedside, though patient typically refuses oral suctioning.  -Blood cultures x2 pending  -If able to produce a sputum sample, can obtain Gram stain and culture.  Patient does not typically expectorate, and most of patient's coughing appears to be related to oral secretions.  -Continue albuterol and Mucomyst nebulizer treatments    Lip-smacking behavior: This is typical for patient prior to fevers.  Potentially represents partial seizures, though I have observed these in the past, and patient is alert during these episodes and able to follow commands.  Prolactin obtained in the emergency department was low.  Would be unusual to see fever of 102 in the setting of a partial seizure, lowering my suspicion that patient's presentations are related to seizure episodes despite history of epilepsy.  -At this time, plan for outpatient follow-up with patient's primary neurologist/epilepsy clinic in November as  scheduled.    Epilepsy: Follows with Dr. Valdes through [facility].  Secondary to TBI.  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  -Continue prior to admission hydrocortisone 10 mg q. evening, 20 mg every morning.  Have not initiated stress dose steroids at this time as patient is vitally stable  -Continue prior to admission levothyroxine 150 mg daily  -Continue prior to admission testosterone supplementation at discharge    Perianal rash: Consistent with Candida in the setting of urinary incontinence.  Painful/uncomfortable for patient.  -Condom catheter  -Antifungal cream  -Wound consult given increased risk of skin breakdown  -Frequent repositioning    Mild hyponatremia:  -Nutrition consulted for reinitiation of tube feeding       Diet: strict NPO.  DVT Prophylaxis: Pneumatic Compression Devices  Lerma Catheter: not present  Code Status: Full code. Confirmed with patient's mother at bedside    Disposition Plan   Expected discharge: 2 - 3 days, recommended to prior living arrangement once Vitally stable off of anti-infectives or anti-infective plan established..  Entered: Jerald Villavicencio MD 10/07/2019, 10:04 PM     The patient's care was discussed with the Patient and Patient's mother at bedside, Dr. Frazier in the emergency department.    Jerald Villavicencio MD  [facility]    ______________________________________________________________________    Chief Complaint   Fever, lip-smacking    History is obtained from chart review, discussion with patient's mother at bedside, discussion with ED provider.  Patient is nonverbal and communicates with thumbs up.  Able to provide a limited amount of additional history.    History of Present Illness   Keyon Farias is a 59 year old male who is well-known to the emergency medicine and hospitalist service at [facility]  who presents with lip smacking observed by home care providers associated with a fever measured at 102.    Patient with a history of TBI resulting in aphasia and chronic aspiration.  Is strict n.p.o. and all of his intake is through G-tube, though continues to have recurrent aspiration events thought secondary to his oral secretions.  Frequent hospitalizations, so far 17 this year.  Nearly all of his presentations are almost identical in how he presents.  Patient has episodes of lipsmacking behavior, and in conjunction with this, is typically noted to be febrile to the 102-103 range.  Patient is brought to the emergency department and is noted to have right sided infiltrate concerning for aspiration pneumonia/pneumonitis.  Given frequency of hospitalizations, he typically receives broad-spectrum anti-infectives, and these are transition to Augmentin at discharge versus the patient is monitor off of anti-infectives given suspicion for pneumonitis.    Patient typically is not hypoxic during his presentations, and this is the case today as well.  Patient also typically would rapidly improves following initial dose of anti-infectives, often within hours.  Note that patient has also had episodes of high fevers with lipsmacking resulting in presentations to the emergency department even despite ongoing outpatient anti-infectives from prior hospitalizations (note this was the case at last admission as well).  Patient was most recently hospitalized 9/21/2019-9/24/2019.  He was initially treated with vancomycin and Zosyn at that time, transition to oral Augmentin at discharge.  Patient completed his oral Augmentin course per mother's report prior to today's presentation.    In the emergency department, patient once again received IV vancomycin and Zosyn.  Lengthy discussion with patient's mother at bedside regarding possibility of aspiration pneumonitis given his history.  There is no definitive way to tell between  "aspiration pneumonitis and aspiration pneumonia aside from bronchoscopy, which would involve greater risk than benefit.  Discussed obtaining procalcitonin, and if low/undetectable, monitoring off of anti-infective therapies given my suspicion for aspiration pneumonitis.  Mother expresses some concern with this strategy, though is understanding, and accepts that he will be closely monitored off of therapies if this is the case.    At home, patient has 24-hour care from professional care providers.  Lives with his mother.    Recently, patient has had a \"sore\" on his buttocks.  On evaluation of this, this appears to be consistent with Candida/irritation related to a wet adult diapers.  Patient's mother actually describes that this has occurred in the setting of 1 of the care providers not placing condom catheters and patient subsequently developed a rash.  Does not appear consistent with pressure injury, and is tender, which would suggest against pressure injury.    Review of Systems    The 10 point Review of Systems is negative other than noted in the HPI or here.   No shortness of breath  No pain other than some buttock pain associated with rash/sore    Past Medical History    I have reviewed this patient's medical history and updated it with pertinent information if needed.   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  Past Surgical History:   Procedure Laterality Date     ENDOSCOPIC ULTRASOUND UPPER GASTROINTESTINAL TRACT (GI) N/A 1/30/2017    Procedure: ENDOSCOPIC ULTRASOUND, ESOPHAGOSCOPY / UPPER GASTROINTESTINAL TRACT (GI);  Surgeon: Jus Montana MD;  Location: [facility] OR     ENDOSCOPIC ULTRASOUND, ESOPHAGOSCOPY, GASTROSCOPY, DUODENOSCOPY (EGD), NECROSECTOMY N/A 2/7/2017    Procedure: ENDOSCOPIC ULTRASOUND, ESOPHAGOSCOPY, GASTROSCOPY, DUODENOSCOPY (EGD), NECROSECTOMY;  Surgeon: Jack Marcus MD;  Location: [facility] OR     ESOPHAGOSCOPY, GASTROSCOPY, DUODENOSCOPY (EGD), COMBINED  3/13/2014    Procedure: COMBINED ESOPHAGOSCOPY, GASTROSCOPY, DUODENOSCOPY (EGD), BIOPSY SINGLE OR MULTIPLE;  gastroscopy;  Surgeon: Digna Rhodes MD;  Location: [facility]     ESOPHAGOSCOPY, GASTROSCOPY, DUODENOSCOPY (EGD), COMBINED N/A 12/6/2016    Procedure: COMBINED ESOPHAGOSCOPY, GASTROSCOPY, DUODENOSCOPY (EGD);  Surgeon: Digna Rhodes MD;  Location: [facility]     ESOPHAGOSCOPY, GASTROSCOPY, DUODENOSCOPY (EGD), COMBINED N/A 2/7/2017    Procedure: COMBINED ENDOSCOPIC ULTRASOUND, ESOPHAGOSCOPY, GASTROSCOPY, DUODENOSCOPY (EGD), FINE NEEDLE ASPIRATE/BIOPSY;  Surgeon: Too Thakur MD;  Location: [facility] OR     HEAD & NECK SURGERY      reconstructive facial surgery following accident in 1989     IR FOLLOW UP VISIT INPATIENT  2/20/2019     IR GASTRO JEJUNOSTOMY TUBE CHANGE  12/20/2018     IR GASTRO JEJUNOSTOMY TUBE CHANGE  2/4/2019     IR GASTRO JEJUNOSTOMY TUBE CHANGE  3/8/2019     IR GASTRO JEJUNOSTOMY TUBE CHANGE  8/7/2019     IR PICC EXCHANGE LEFT  8/15/2019     LAPAROSCOPIC APPENDECTOMY  7/30/2013    Procedure: LAPAROSCOPIC  APPENDECTOMY;  LAPAROSCOPIC APPENDECTOMY;  Surgeon: Manish Pierce MD;  Location:  OR     LAPAROSCOPIC ASSISTED INSERTION TUBE GASTROTOMY N/A 2016    Procedure: LAPAROSCOPIC ASSISTED INSERTION TUBE GASTROSTOMY;  Surgeon: Manish Pierce MD;  Location:  OR     ORTHOPEDIC SURGERY      right hand repair     TRACHEOSTOMY N/A 9/3/2016    Procedure: TRACHEOSTOMY;  Surgeon: João Ortiz MD;  Location:  OR     TRACHEOSTOMY N/A 2016    Procedure: TRACHEOSTOMY;  Surgeon: João Ortiz MD;  Location:  OR     VASCULAR SURGERY         Social History   I have reviewed this patient's social history and updated it with pertinent information if needed.  Social History     Tobacco Use     Smoking status: Former Smoker     Last attempt to quit: 1989     Years since quittin.4     Smokeless tobacco: Never Used   Substance Use Topics     Alcohol use: No     Drug use: No       Family History   I have reviewed this patient's family history and updated it with pertinent information if needed.   Family History   Problem Relation Age of Onset     Cancer Father        Prior to Admission Medications   Prior to Admission Medications   Prescriptions Last Dose Informant Patient Reported? Taking?   Bioflavonoid Products (VITAMIN C PLUS) 1000 MG TABS  Mother Yes No   Si tablet by Oral or FT or NG tube route   Brivaracetam (BRIVIACT) 10 MG/ML solution  Mother Yes No   Si mg by Oral or Feeding Tube route 2 times daily 0900, 2100   Guar Gum (FIBER MODULAR, NUTRISOURCE FIBER,) packet  Mother No No   Si packet by Per G Tube route daily   Scopolamine HBr POWD   No No   Si.4 mg 3 times daily as needed (oral secretions)   Skin Protectants, Misc. (BALMEX SKIN PROTECTANT) OINT  Mother Yes No   Sig: Externally apply topically 2 times daily as needed (irritation) Applay to reddened memo areas twice daily as needed   acetylcysteine (MUCOMYST) 20 % neb solution  Mother Yes No   Sig: Take 2  mLs by nebulization every 6 hours With albuterol at 0700, 1100, 1500, and 1900   albuterol (PROVENTIL) (5 MG/ML) 0.5% neb solution  Mother Yes No   Sig: Take 2.5 mg by nebulization every 4 hours (while awake) 0700 1100 1500 1900 with mucomyst    amoxicillin-clavulanate (AUGMENTIN) 875-125 MG tablet  Mother Yes No   Sig: Take 1 tablet through feeding tube 2 times daily. Day 1 was 2019   aspirin (ASA) 81 MG chewable tablet  Mother Yes No   Si mg by Oral or Feeding Tube route daily At 0900   bacitracin ointment  Mother Yes No   Sig: Apply topically daily as needed for wound care To PEG site.    calcium carbonate 1250 (500 CA) MG/5ML SUSP suspension  Mother No No   Si mLs (1,250 mg) by Per J Tube route 3 times daily (with meals)   carBAMazepine (TEGRETOL) 100 MG/5ML suspension  Mother Yes No   Si mg by Oral or Feeding Tube route every 6 hours At 06:00, 12:00, 18:00 and 24:00 for seizures    ferrous sulfate 220 (44 Fe) MG/5ML ELIX  Mother Yes No   Si mg by Per Feeding Tube route daily   hydrocortisone (CORTEF) 5 MG tablet  Mother Yes No   Sig: 10 mg by Oral or FT or NG tube route daily (with dinner) At 1500   hydrocortisone (CORTEF) 5 MG tablet  Mother Yes No   Si mg by Oral or FT or NG tube route every morning    hydrocortisone 1 % CREA cream  Mother Yes No   Sig: Place rectally 2 times daily as needed for other Apply to reddened memo areas as needed   ipratropium - albuterol 0.5 mg/2.5 mg/3 mL (DUONEB) 0.5-2.5 (3) MG/3ML neb solution  Mother Yes No   Sig: Take 1 vial by nebulization every 4 hours as needed (bronchospasms or cough)   levothyroxine (SYNTHROID/LEVOTHROID) 150 MCG tablet  Mother Yes No   Sig: Take 150 mcg by mouth every morning   melatonin (MELATONIN) 1 MG/ML LIQD liquid  Mother Yes No   Si mg by Per NG tube route At Bedtime    metoclopramide (REGLAN) 5 MG/5ML solution  Mother Yes No   Si mg by Per Feeding Tube route 2 times daily   miconazole (MICATIN) 2 % AERP  powder  Mother Yes No   Sig: Apply topically 2 times daily as needed    mupirocin (BACTROBAN) 2 % external ointment  Mother Yes No   Sig: Apply topically 2 times daily as needed    order for DME  Mother No No   Sig: Equipment being ordered: Nebulizer   pantoprazole (PROTONIX) 2 mg/mL SUSP suspension  Mother No No   Si mLs (40 mg) by Per J Tube route daily   potassium & sodium phosphates (NEUTRA-PHOS) 280-160-250 MG Packet  Mother Yes No   Sig: Take 1 packet by mouth 3 times daily 0900, 1500, 2100.    testosterone cypionate (DEPOTESTOTERONE CYPIONATE) 200 MG/ML injection  Mother Yes No   Sig: Inject 76 mg into the muscle See Admin Instructions Every 2 weeks on    76 mg or 0.38 mL   vitamin D3 (CHOLECALCIFEROL) 2000 units (50 mcg) tablet  Mother Yes No   Sig: Take 2,000 Units by mouth daily Crush and feed via j-tube @@ 0900      Facility-Administered Medications: None     Allergies   Allergies   Allergen Reactions     Dilantin [Phenytoin Sodium]      Valproic Acid      Toxicity c bone marrow suspension, elevated ammonia levels        Physical Exam   Vital Signs: Temp: 101.4  F (38.6  C) Temp src: Axillary BP: 110/64 Pulse: 92 Heart Rate: 96 Resp: 26 SpO2: 97 % O2 Device: None (Room air)      General Appearance: Well-appearing 59-year-old male reclining comfortably in bed.  Eyes: No scleral icterus or injection  HEENT: Evidence of prior tracheostomy which is currently healed over.  When laid down for pressure injury evaluation, patient did have a coughing jag consistent with recurrent aspiration.  Respiratory: Breath sounds largely clear to auscultation, few crackles in the right base.  Cardiovascular: Tachycardia to the 100 range.  Regular rhythm.  No appreciable murmur  GI: Feeding tube in place.  Abdomen soft, nontender, no palpable mass  Lymph/Hematologic: 1+ bilateral lower extremity edema consistent with chronic immobility  Genitourinary: Perianal rash consistent with Candida.  Skin: Perianal rash  consistent with Candida..  Does not appear consistent with pressure injury  Musculoskeletal: Right sided contractures from prior neurologic insult  Neurologic: Alert.  Nonverbal.  Communicates with thumbs up.  Psychiatric: pleasant     Data   Data reviewed today: I reviewed all medications, new labs and imaging results over the last 24 hours. I personally reviewed the chest x-ray image(s) showing Questionable right base infiltrate.    Recent Labs   Lab 10/07/19  2004   WBC 13.8*   HGB 10.5*   MCV 83      *   POTASSIUM 4.1   CHLORIDE 95   CO2 28   BUN 14   CR 0.66   ANIONGAP 6   STEVE 8.0*   *   ALBUMIN 2.7*   PROTTOTAL 6.9   BILITOTAL 0.2   ALKPHOS 80   ALT 30   AST 20

## 2019-10-08 NOTE — PROGRESS NOTES
RECEIVING UNIT ED HANDOFF REVIEW    ED Nurse Handoff Report was reviewed by: Young Moralez RN on October 7, 2019 at 10:33 PM

## 2019-10-08 NOTE — ED TRIAGE NOTES
"Pt presents via EMS for fever of 102' that started this evening.  Pt has hx of TBI, and has aphasia due to this, and is bed bound.  Hx of aspiration pneumonia, pt has G-tube.  EMS reports that family said in the \"past when pt had fevers, he has deteriorated quickly.\".  Family on their way to ER.  "

## 2019-10-08 NOTE — PLAN OF CARE
Pt AOx4, but nonverbal (will do thumbs up/occasionally shake head) and VSS on RA, tele; NS. Pt is NPO; has G/J tube in place and tolerated med administration and continuous tube feed, w/ free water flush. Pt scores 0 on rFLACC pain scale. PIV patent; infusing D5NS. Dressing CDI. Pt has non-blanchable redness/rash on buttocks; WOC RN changed orders for wound care. Q2hr turns. Pt is incontinent of B/B; condom cath in place and patent. BM 2x this shift. Plan to discharge home in 1-2 days per MD note.

## 2019-10-08 NOTE — TELEPHONE ENCOUNTER
Left message to advise NANCI Kohli, that it's OK to increase Scoplamine dosing as requested and to contact clinic if new rx needed.  Left call back number for questions.

## 2019-10-08 NOTE — CONSULTS
CLINICAL NUTRITION SERVICES  -  ASSESSMENT NOTE    Recommendations Ordered by Registered Dietitian (RD):   Resume EN per home regimen (using formulary equivalent):   - GJT (feed J Port) - last exchange 8/2019  - Isosource 1.5 (equivalent to home Jevity 1.5) @ 100 ml/hr x 12 hours (7am-7pm)  - Provides 1800 kcals, 82 gm pro, 18 gm fiber, 912 mL H20, 211 gm CHO  - Add Nutrisource Fiber 1 pkt daily for additional 15 kcal, 3 g fiber  - Add Prosource 1 pkt BID for additional 80 kcal, 22 g protein  Total Provisions: 1900 kcals (26 kcal/kg and), 104 gm pro (1.5 gm/kg), 21 g fiber.   - Fluid flushes of 60 ml q 4 hrs while receiving IVF - recommend transition to non-dextrose IV once EN started (once off IVF, recommend increase flushes to 150 mL every 4 hrs)   Malnutrition:   % Weight Loss:  None noted  % Intake:  No decreased intake noted  Subcutaneous Fat Loss:  Orbital region mild depletion  Muscle Loss:  Temporal region mild depletion (pt with hemiplegia, decreased muscle, no acute loss)  Fluid Retention:  None noted    Malnutrition Diagnosis: Patient does not meet two of the above criteria necessary for diagnosing malnutrition     REASON FOR ASSESSMENT  Keyon Farias is a 59 year old male seen by Registered Dietitian for Admission Nutrition Risk Screen for tube feeding or parenteral nutrition and Provider Order - Registered Dietitian to Assess and Order TF per Medical Nutrition Therapy Protocol    NUTRITION HISTORY  - Information obtained from Chart Review.   - patient well- known to clinical nutrition services with GJT to meet 100% nutrition needs. 17 hospitalizations this year.   - PMH TBI with aphasia, bed bound, tendency for aspiration pna, mild hyponatremia. Pt does move upper extremities.   - Primary care provider recently started scopolamine powder to help dry secretions, as he does not allow home RN to suction him.  - lives with his mother and has 24 hour care from home health agency.    - Tube type: GJT (last  "replaced on 8/7/2019)  - Enteral Regimen: Jevity 1.5 (5 to 5.5 cans daily) x 12 hours during the day  (7 am - 7 pm)  - Provides: 2565-1836 kcal, 77-83g protein, ~260g CHO, ~27g fiber and ~900mL free water.   - Fluid flush: H2O flushes 120 mL QID.  \"- Pt lives with his mother, she likes to keep the TF rate at no more than 100 ml/hr. He is fed during the day rather than at night so that he can be up in the chair to prevent aspiration\"    CURRENT NUTRITION ORDERS  Diet Order:     NPO     Current Intake/Tolerance:  NA - EN not yet started    NUTRITION FOCUSED PHYSICAL ASSESSMENT FOR DIAGNOSING MALNUTRITION)  Completed:  Yes Partial assessment No LEs - note bedbound         Observed:    Muscle wasting (refer to documentation in Malnutrition section) and Subcutaneous fat loss (refer to documentation in Malnutrition section)    Obtained from Chart/Interdisciplinary Team:  Perianal rash - consistent with Candida r/t urinary incontinence. \"does not appear consistent with pressure injury\"   - WOCN consult   [facility] - Nutrition 2; Total 8  Last BM PTA    ANTHROPOMETRICS  Height: 6' 0\"  Weight: 163 lbs 12.83 oz (74.3 kg)   Body mass index is 22.22 kg/m .  Weight Status:  Normal BMI  IBW: 80.9 kg  % IBW: 92%  Weight History:   Wt Readings from Last 10 Encounters:   10/08/19 74.3 kg (163 lb 12.8 oz)   09/27/19 73.9 kg (163 lb)   09/24/19 74 kg (163 lb 2.3 oz)   09/18/19 69.9 kg (154 lb 1.6 oz)   09/13/19 72 kg (158 lb 11.2 oz)   08/15/19 75.4 kg (166 lb 3.6 oz)   08/07/19 72.6 kg (160 lb)   06/19/19 72.4 kg (159 lb 11.2 oz)   05/30/19 74.5 kg (164 lb 3.9 oz)   05/08/19 74.5 kg (164 lb 3.2 oz)     LABS  Labs reviewed  Recent Labs   Lab Test 10/07/19  2004 09/24/19  0751 09/23/19  0935 09/22/19  0829 09/21/19 2037   POTASSIUM 4.1 3.4 3.6 4.0 4.3     Recent Labs   Lab Test 09/23/19  0935 09/19/19  0935 09/19/19  0103 09/18/19  0908 09/17/19  0836   PHOS 1.9* 2.5 3.1 2.2* 2.5     Recent Labs   Lab Test 09/23/19  0935 09/19/19  0935 " 09/18/19  0908 09/17/19  0836 09/14/19  0636   MAG 2.1 2.0 2.0 1.9 1.7     Recent Labs   Lab Test 10/07/19  2004 09/24/19  0751 09/23/19  0935 09/22/19  0829 09/21/19 2037   * 137 139 135 132*     Recent Labs   Lab Test 10/07/19  2004 09/24/19  0751 09/23/19  0935 09/22/19  0829 09/21/19 2037   CR 0.66 0.76 0.72 0.77 0.72     Recent Labs   Lab 10/07/19  2004   *     Lab Results   Component Value Date    A1C 6.1 08/12/2019    A1C 6.1 05/07/2019    A1C 6.3 11/22/2018    A1C 6.6 02/15/2018    A1C 5.1 11/24/2016     Triglycerides   Date Value Ref Range Status   11/29/2016 100 <150 mg/dL Final        MEDICATIONS  Medications reviewed  NutriSource Fiber 1 pkt daily   Reglan 5 mg - gut motility   NeutraPhos 1 pkt daily  Vit D3 2000 international unit(s)  D5 + NaCl IVF @ 100 mL/hr --> 120 g, 408 kcal daily from dextrose.     ASSESSED NUTRITION NEEDS PER APPROVED PRACTICE GUIDELINES:  Dosing Weight 74.3 kg  Estimated Energy Needs: 4903-5053 kcals (25-30 Kcal/Kg)  Justification: spastic hemiplegia  Estimated Protein Needs:  grams protein (1.2-1.5 g pro/Kg)  Justification: preservation of lean body mass  Estimated Fluid Needs: 4097-4231 mL   Justification: maintenance    MALNUTRITION:  % Weight Loss:  None noted  % Intake:  No decreased intake noted  Subcutaneous Fat Loss:  Orbital region mild depletion  Muscle Loss:  Temporal region mild depletion (pt with hemiplegia, decreased muscle, no acute loss)  Fluid Retention:  None noted    Malnutrition Diagnosis: Patient does not meet two of the above criteria necessary for diagnosing malnutrition    NUTRITION DIAGNOSIS:  Predicted inadequate nutrient intake (pro/kcal) related to potential for EN interruptions while admitted, EN not yet started.       NUTRITION INTERVENTIONS  Recommendations / Nutrition Prescription  Resume EN per home regimen (using formulary equivalent):   - GJT (feed J Port) - last exchange 8/2019  - Isosource 1.5 (equivalent to home Jevity  1.5) @ 100 ml/hr x 12 hours (7am-7pm)  - Provides 1800 kcals, 82 gm pro, 18 gm fiber, 912 mL H20, 211 gm CHO  - Add Nutrisource Fiber 1 pkt daily for additional 15 kcal, 3 g fiber  - Add Prosource 1 pkt BID for additional 80 kcal, 22 g protein  Total Provisions: 1900 kcals (26 kcal/kg and), 104 gm pro (1.5 gm/kg), 21 g fiber.   - Fluid flushes of 60 ml q 4 hrs while receiving IVF - recommend transition to non-dextrose IV once EN started (once off IVF, recommend increase flushes to 150 mL every 4 hrs)      Implementation  Nutrition education: Not appropriate at this time due to patient condition  EN Composition, EN Schedule and Feeding Tube Flush: as above. Per home regimen.       Nutrition Goals  EN to meet % est nutrient needs.       MONITORING AND EVALUATION:  Progress towards goals will be monitored and evaluated per protocol and Practice Guidelines    Marisel Fernández RD, LD  [facility], [facility] [facility], [place]   Pager: [phone]  Weekend Pager: [phone]

## 2019-10-08 NOTE — PHARMACY-ADMISSION MEDICATION HISTORY
Admission medication history interview status for the 10/7/2019  admission is complete. See EPIC admission navigator for prior to admission medications     Medication history source reliability:Good    Actions taken by pharmacist (provider contacted, etc): Pt recently d/c'd, d/w mother who stated only 2 med changes - Scopolamine added and NeutraPhos changed to once daily     Additional medication history information not noted on PTA med list :None    Medication reconciliation/reorder completed by provider prior to medication history? No    Time spent in this activity: 15 minutes    Prior to Admission medications    Medication Sig Last Dose Taking? Auth Provider   acetylcysteine (MUCOMYST) 20 % neb solution Take 2 mLs by nebulization every 6 hours With albuterol at 0700, 1100, 1500, and 1900 10/7/2019 at 1500 Yes Unknown, Entered By History   albuterol (PROVENTIL) (5 MG/ML) 0.5% neb solution Take 2.5 mg by nebulization every 4 hours (while awake) 0700 1100 1500 1900 with mucomyst  10/7/2019 at 1500 Yes Unknown, Entered By History   amoxicillin-clavulanate (AUGMENTIN) 875-125 MG tablet Take 1 tablet through feeding tube 2 times daily. Day 1 was 9/20/2019 10/7/2019 at am Yes Unknown, Entered By History   aspirin (ASA) 81 MG chewable tablet 81 mg by Oral or Feeding Tube route daily At 0900 10/7/2019 at Unknown time Yes Unknown, Entered By History   bacitracin ointment Apply topically daily as needed for wound care To PEG site.  10/7/2019 at Unknown time Yes Unknown, Entered By History   Bioflavonoid Products (VITAMIN C PLUS) 1000 MG TABS 1 tablet by Oral or FT or NG tube route 10/7/2019 at Unknown time Yes Unknown, Entered By History   Brivaracetam (BRIVIACT) 10 MG/ML solution 100 mg by Oral or Feeding Tube route 2 times daily 0900, 2100 10/7/2019 at am Yes Unknown, Entered By History   calcium carbonate 1250 (500 CA) MG/5ML SUSP suspension 5 mLs (1,250 mg) by Per J Tube route 3 times daily (with meals) 10/7/2019 at  1500 Yes Mariana Venegas MD   carBAMazepine (TEGRETOL) 100 MG/5ML suspension 150 mg by Oral or Feeding Tube route every 6 hours At 06:00, 12:00, 18:00 and 24:00 for seizures  10/7/2019 at 1800 Yes Unknown, Entered By History   ferrous sulfate 220 (44 Fe) MG/5ML ELIX 220 mg by Per Feeding Tube route daily 10/7/2019 at Unknown time Yes Unknown, Entered By History   Guar Gum (FIBER MODULAR, NUTRISOURCE FIBER,) packet 1 packet by Per G Tube route daily 10/7/2019 at Unknown time Yes Starr Champion MD   hydrocortisone (CORTEF) 5 MG tablet 10 mg by Oral or FT or NG tube route daily (with dinner) At 1500 10/7/2019 at 1500 Yes Unknown, Entered By History   hydrocortisone (CORTEF) 5 MG tablet 20 mg by Oral or FT or NG tube route every morning  10/7/2019 at am Yes Unknown, Entered By History   hydrocortisone 1 % CREA cream Place rectally 2 times daily as needed for other Apply to reddened memo areas as needed prn Yes Unknown, Entered By History   ipratropium - albuterol 0.5 mg/2.5 mg/3 mL (DUONEB) 0.5-2.5 (3) MG/3ML neb solution Take 1 vial by nebulization every 4 hours as needed (bronchospasms or cough) prn Yes Unknown, Entered By History   levothyroxine (SYNTHROID/LEVOTHROID) 150 MCG tablet Take 150 mcg by mouth every morning 10/7/2019 at am Yes Unknown, Entered By History   melatonin (MELATONIN) 1 MG/ML LIQD liquid 6 mg by Per NG tube route At Bedtime  10/6/2019 at Unknown time Yes Unknown, Entered By History   metoclopramide (REGLAN) 5 MG/5ML solution 5 mg by Per Feeding Tube route 2 times daily 10/7/2019 at am Yes Unknown, Entered By History   miconazole (MICATIN) 2 % AERP powder Apply topically 2 times daily as needed  prn Yes Unknown, Entered By History   mupirocin (BACTROBAN) 2 % external ointment Apply topically 2 times daily as needed  prn Yes Reported, Patient   pantoprazole (PROTONIX) 2 mg/mL SUSP suspension 20 mLs (40 mg) by Per J Tube route daily 10/7/2019 at Unknown time Yes Washington Connors MD    potassium & sodium phosphates (NEUTRA-PHOS) 280-160-250 MG Packet Take 1 packet by mouth daily 0900 10/7/2019 at Unknown time Yes Unknown, Entered By History   Scopolamine HBr POWD 0.4 mg by Oral or Feeding Tube route 3 times daily 10/7/2019 at x2 Yes Unknown, Entered By History   Skin Protectants, Misc. (BALMEX SKIN PROTECTANT) OINT Externally apply topically 2 times daily as needed (irritation) Applay to reddened memo areas twice daily as needed prn Yes Unknown, Entered By History   testosterone cypionate (DEPOTESTOTERONE CYPIONATE) 200 MG/ML injection Inject 76 mg into the muscle See Admin Instructions Every 2 weeks on Fridays   76 mg or 0.38 mL 10/4/2019 at Unknown time Yes Unknown, Entered By History   vitamin D3 (CHOLECALCIFEROL) 2000 units (50 mcg) tablet Take 2,000 Units by mouth daily Crush and feed via j-tube @@ 0900 10/7/2019 at Unknown time Yes Unknown, Entered By History   order for DME Equipment being ordered: Nebulizer   Starr Champion MD

## 2019-10-08 NOTE — ED NOTES
Bed: ED29  Expected date:   Expected time:   Means of arrival:   Comments:  E2  59 m TBI/fever  1858

## 2019-10-08 NOTE — PLAN OF CARE
Admission    Patient arrives to room 609 via cart from ED.      Inpatient nursing criteria listed below were met:    PCD's Documented: YES  Skin issues/needs documented :Yes  Isolation education started/completed No  Patient allergies verified with patient: No  Verified completion of [place] Risk Assessment Tool:  Yes  Verified completion of Guardianship screening tool: Yes  Fall Prevention: Care plan updated, Education given and documented Yes  Care Plan initiated: Yes  Home medications documented in belongings flowsheet: NA  Patient belongings documented in belongings flowsheet: Yes  Reminder note (belongings/ medications) placed in discharge instructions:No  Admission profile/ required documentation complete: No  Bedside Report Letter given and explained to patient Yes

## 2019-10-08 NOTE — PLAN OF CARE
DATE & TIME: 10/08/19 9835-1620   Cognitive Concerns/ Orientation : A&O, Hx TBI, non-verbal; gives thumbs up, follows commands   BEHAVIOR & AGGRESSION TOOL COLOR: Green  CIWA SCORE: NA   ABNL VS/O2: VSS on room air   MOBILITY: Total care/lift; turn/repo q 2 hrs  PAIN MANAGMENT: No non-verbal indicators of pain  DIET: NPO; G/J tube clamped, Nutrition services consult placed  BOWEL/BLADDER: Incontinent of b/b, no bm this shift  ABNL LAB/BG: Sodium 129; WBC 13.8  DRAIN/DEVICES: PIV L arm with IVF infusing @ 100 mL/hr; G/J tube  TELEMETRY RHYTHM: NSR  SKIN: Excoriated memo area; non-blanchable redness/ painful bilateral buttocks/coccyx; miconazole cream applied  TESTS/PROCEDURES: None  D/C DAY/GOALS/PLACE: Discharge pending  OTHER IMPORTANT INFO: WOC consult placed; LS diminished; infrequent cough

## 2019-10-08 NOTE — ED NOTES
"[facility]  ED Nurse Handoff Report    ED Chief complaint: Fever      ED Diagnosis:   Final diagnoses:   None       Code Status: To be determined by hospitalist    Allergies:   Allergies   Allergen Reactions     Dilantin [Phenytoin Sodium]      Valproic Acid      Toxicity c bone marrow suspension, elevated ammonia levels        Activity level - Baseline/Home:  Total Care  Activity Level - Current:   Total Care    Patient's Preferred language: english   Needed?: No    Isolation: Yes  Infection: MRSA, VRE  Bariatric?: No    Vital Signs:   Vitals:    10/07/19 1909   BP: 113/71   Pulse: 100   Resp: 26   Temp: 101.4  F (38.6  C)   TempSrc: Axillary   SpO2: 97%       Cardiac Rhythm: ,        Pain level:      Is this patient confused?: No   Does this patient have a guardian?  Yes         If yes, is there guardianship documents in the Epic \"Code/ACP\" activity?  Yes         Guardian Notified?  Yes  [place] - Suicide Severity Rating Scale Completed?  Yes  If yes, what color did the patient score?  White    Patient Report: Initial Complaint: Pt presents to ED with weakness and fever.  Hx TBI, and aspiration pneumonia.  Focused Assessment: Cough, fever.  Tests Performed:   Results for orders placed or performed during the hospital encounter of 10/07/19   Chest XR,  PA & LAT    Narrative    XR CHEST TWO VIEWS   10/7/2019 8:32 PM     HISTORY: Aspiration pneumonia.    COMPARISON: 9/27/2019.      Impression    IMPRESSION: Some patchy opacities at the right lung base may be  atelectasis or new airspace disease. Hypoinflated lungs with stable  mild elevation of the right hemidiaphragm. Normal cardiac silhouette.    ZAIRE HILL MD   CBC with platelets + differential   Result Value Ref Range    WBC 13.8 (H) 4.0 - 11.0 10e9/L    RBC Count 3.77 (L) 4.4 - 5.9 10e12/L    Hemoglobin 10.5 (L) 13.3 - 17.7 g/dL    Hematocrit 31.4 (L) 40.0 - 53.0 %    MCV 83 78 - 100 fl    MCH 27.9 26.5 - 33.0 pg    MCHC 33.4 31.5 - " 36.5 g/dL    RDW 13.6 10.0 - 15.0 %    Platelet Count 212 150 - 450 10e9/L    Diff Method Automated Method     % Neutrophils 76.4 %    % Lymphocytes 12.2 %    % Monocytes 8.7 %    % Eosinophils 2.2 %    % Basophils 0.4 %    % Immature Granulocytes 0.1 %    Nucleated RBCs 0 0 /100    Absolute Neutrophil 10.5 (H) 1.6 - 8.3 10e9/L    Absolute Lymphocytes 1.7 0.8 - 5.3 10e9/L    Absolute Monocytes 1.2 0.0 - 1.3 10e9/L    Absolute Eosinophils 0.3 0.0 - 0.7 10e9/L    Absolute Basophils 0.1 0.0 - 0.2 10e9/L    Abs Immature Granulocytes 0.0 0 - 0.4 10e9/L    Absolute Nucleated RBC 0.0    Comprehensive metabolic panel   Result Value Ref Range    Sodium 129 (L) 133 - 144 mmol/L    Potassium 4.1 3.4 - 5.3 mmol/L    Chloride 95 94 - 109 mmol/L    Carbon Dioxide 28 20 - 32 mmol/L    Anion Gap 6 3 - 14 mmol/L    Glucose 115 (H) 70 - 99 mg/dL    Urea Nitrogen 14 7 - 30 mg/dL    Creatinine 0.66 0.66 - 1.25 mg/dL    GFR Estimate >90 >60 mL/min/[1.73_m2]    GFR Estimate If Black >90 >60 mL/min/[1.73_m2]    Calcium 8.0 (L) 8.5 - 10.1 mg/dL    Bilirubin Total 0.2 0.2 - 1.3 mg/dL    Albumin 2.7 (L) 3.4 - 5.0 g/dL    Protein Total 6.9 6.8 - 8.8 g/dL    Alkaline Phosphatase 80 40 - 150 U/L    ALT 30 0 - 70 U/L    AST 20 0 - 45 U/L   Lactic acid whole blood   Result Value Ref Range    Lactic Acid 1.4 0.7 - 2.0 mmol/L   ISTAT gases lactate gabe POCT   Result Value Ref Range    Ph Venous 7.38 7.32 - 7.43 pH    PCO2 Venous 46 40 - 50 mm Hg    PO2 Venous 29 25 - 47 mm Hg    Bicarbonate Venous 27 21 - 28 mmol/L    O2 Sat Venous 52 %    Lactic Acid 1.7 0.7 - 2.1 mmol/L       Abnormal Results: see above  Treatments provided: see MAR    Family Comments: Mother at bedside.    OBS brochure/video discussed/provided to patient/family: N/A              Name of person given brochure if not patient: n/a              Relationship to patient: n/a    ED Medications:   Medications   sodium chloride (PF) 0.9% PF flush 3 mL (has no administration in time range)    sodium chloride (PF) 0.9% PF flush 3 mL (has no administration in time range)   vancomycin (VANCOCIN) 1000 mg in dextrose 5% 200 mL PREMIX (has no administration in time range)   Brivaracetam (BRIVIACT) solution 100 mg (has no administration in time range)   0.9% sodium chloride BOLUS (1,000 mLs Intravenous New Bag 10/7/19 2007)   ketorolac (TORADOL) injection 15 mg (15 mg Intravenous Given 10/7/19 2044)   piperacillin-tazobactam (ZOSYN) 3.375 g vial to attach to  mL bag (3.375 g Intravenous New Bag 10/7/19 2052)       Drips infusing?:  No    For the majority of the shift this patient was Green.   Interventions performed were tlc.    Severe Sepsis OR Septic Shock Diagnosis Present: No    To be done/followed up on inpatient unit:  n/a    ED NURSE PHONE NUMBER: [phone]

## 2019-10-08 NOTE — PROGRESS NOTES
[facility]    HOSPITALIST PROGRESS NOTE :   --------------------------------------------------    Date of Admission:  10/7/2019    Cumulative Summary: Keyon Farias is a 59 year old male with past medical history significant for traumatic brain injury with residual aphasia and right-sided spastic hemiplegia, seizure disorder, history of panhypopituitarism, hypothyroidism, GERD, dysphagia status post PEG tube placement and recurrent aspiration pneumonia requiring multiple hospitalizations to the hospital was recently discharged from the hospital on September 24 who was admitted yesterday due to the concern for possible aspiration pneumonitis versus pneumonia.    Assessment & Plan     Active Problems:  Aspiration, chronic pulmonary, initial encounter (10/7/2019):' Patient is at high risk for recurrent aspiration pneumonia so far this is his 17th hospitalization this year.  Most of his presentations are almost identical, presenting with fever of 10 2-1 03 range.  Patient was brought to the ER last night and was noted to have possible right-sided infiltrate concerning for aspiration pneumonia versus pneumonitis.  Patient is usually not hypoxic during his presentations.  There is also concerned that sometimes patient also had episodes of high fevers with lip smacking resulting in presentation to the ER event despite ongoing outpatient antibiotics from previous hospitalization which was the case at his last admission as well.  In the ER patient received IV vancomycin and Zosyn, Dr. Jerald Villavicencio had lengthy discussion with patient mother at bedside regarding possibility of aspiration pneumonitis and consideration for monitoring him closely while not giving further antibiotics.    --Continue to monitor patient closely.  --As discussion with family, will continue to hold antibiotics at this time but will monitor patient closely for signs and symptoms of infection and sepsis.  --Ordered procalcitonin this  morning which came back low  --Oximetry and oxygen only if needed.  --Strict n.p.o., tube feedings only including medications through tube only.  --Resume a scopolamine powder if available, patient with a rash to scopolamine patch, though this was beneficial for patient in the past.  Mother will bring the powder to Scopolamine in a.m.  --Keep head of the bed greater than 30 degrees.  --Section frequently though patient typically refuses oral suctioning.  --We will follow-up on blood culture results.  --If able to produce a sputum sample, can obtain Gram stain and culture but usually patient does not typically expectorate.  --Continue albuterol and Mucomyst nebulizer treatments.  --If patient is starts to spike fever, then probably can be started on antibiotics.    Lipsmacking behavior: Mostly this is typical for patient prior to fevers.  Potentially represents partial seizures patient can be alert during these episodes and is able to follow commands.  Prolactin obtained in the ER was low.  Would be unusual to see fever of 102 in the setting of partial seizure so I do not think that seizure resulted in his current presentation completely.  Epilepsy: Follows with Dr. Phylicia Stout through [facility], thought to be secondary to TBI.    --Plan for outpatient follow-up with patient's primary neurologist/epilepsy clinic in November as a schedule.   -- Continue prior to admission Tegretol,Briviact.  --Seizure precautions.    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  -Continue prior to admission hydrocortisone 10 mg q. evening, 20 mg every morning.  Have not initiated stress dose steroids at this time as patient is vitally stable  -Continue prior to admission levothyroxine 150 mg daily  -Continue prior to admission testosterone supplementation at discharge     Perianal rash: Consistent with Candida in the setting of urinary incontinence.  Painful/uncomfortable for patient.  -Condom catheter  -Antifungal  cream  -Wound consult given increased risk of skin breakdown  -Frequent repositioning     Mild hyponatremia:  -Nutrition consulted for reinitiation of tube feeding    Diet: NPO for Medical/Clinical Reasons Except for: No Exceptions  Adult Formula Drip Feeding: Continuous Isosource 1.5; Jejunostomy; Goal Rate: 100; mL/hr; From: 7:00 AM; 7:00 PM; Medication - Feeding Tube Flush Frequency: At least 15-30 mL water before and after medication administration and with tube clogging;...    Lerma Catheter: not present  DVT Prophylaxis: Pneumatic Compression Devices  Code Status: Full Code    The patient's care was discussed with the Bedside Nurse and Patient.    Disposition Plan   Expected discharge:in 1-2 days , despite patient being not on antibiotics at this time has lots of comorbidities and is at risk of developing fever and sepsis as he developed last time and required returning to hospital in few hours after the discharge would benefit from staying in hospital for more than 2 nights and getting monitored closely for signs and symptoms of infection.    Yanely Liriano MD, FACP  Text Page (7am - 6pm)    ----------------------------------------------------------------------------------------------------------------------    Interval History   Patient care was assumed this morning, patient was seen and examined, known to me from his previous admission was discharged on September 24, patient is done with his antibiotics, at the time of my evaluation mother was not present in the room, reviewed plan of care with bedside RN.  Patient seems comfortable, is afebrile and is nonverbal.    -Data reviewed today: I reviewed all new labs and imaging results over the last 24 hours.    I personally reviewed no images or EKG's today.    Physical Exam   Temp: 99  F (37.2  C) Temp src: Oral BP: 100/54 Pulse: 87 Heart Rate: 71 Resp: 15 SpO2: 94 % O2 Device: None (Room air)    Vitals:    10/08/19 0614   Weight: 74.3 kg (163 lb 12.8 oz)      Vital Signs with Ranges  Temp:  [97.9  F (36.6  C)-101.4  F (38.6  C)] 99  F (37.2  C)  Pulse:  [] 87  Heart Rate:  [71-96] 71  Resp:  [10-26] 15  BP: (100-126)/(53-71) 100/54  SpO2:  [94 %-98 %] 94 %  I/O last 3 completed shifts:  In: 532 [I.V.:442; NG/GT:90]  Out: -     GENERAL: Alert , awake , non verbal at baseline . NAD. appropriate.   HEENT: Normocephalic. EOMI. No icterus or injection. Nares normal.   LUNGS: Clear to auscultation. No dyspnea at rest.   HEART: Regular rate. Extremities perfused.   ABDOMEN: Soft, nontender, and nondistended. Positive bowel sounds. Feeding tube in place   EXTREMITIES: 1+B/L lower ext edema   NEUROLOGIC: Moves extremities x4 on command. No acute focal neurologic abnormalities noted.     Medications     IV fluid REPLACEMENT ONLY       dextrose 5% and 0.9% NaCl 100 mL/hr at 10/08/19 0147       acetylcysteine  2 mL Nebulization Q6H     albuterol  2.5 mg Nebulization Q4H While awake     aspirin  81 mg Oral or Feeding Tube Daily     Brivaracetam  100 mg Oral or Feeding Tube BID     carBAMazepine  150 mg Oral or Feeding Tube Q6H     ferrous sulfate  220 mg Oral Daily     fiber modular (NUTRISOURCE FIBER)  1 packet Per G Tube Daily     hydrocortisone  10 mg Oral or Feeding Tube Daily with supper     hydrocortisone  20 mg Oral or Feeding Tube QAM     levothyroxine  150 mcg Oral or Feeding Tube QAM     melatonin  6 mg Per NG tube At Bedtime     metoclopramide  5 mg Per Feeding Tube BID     miconazole   Topical BID     pantoprazole  40 mg Per J Tube Daily     potassium & sodium phosphates  1 packet Oral Daily     protein modular  1 packet Per Feeding Tube Daily     Scopolamine HBr  0.4 mg Oral or Feeding Tube TID     sodium chloride (PF)  3 mL Intracatheter Q8H     vitamin D3  2,000 Units Oral or Feeding Tube Daily       Data   Recent Labs   Lab 10/07/19  2004   WBC 13.8*   HGB 10.5*   MCV 83      *   POTASSIUM 4.1   CHLORIDE 95   CO2 28   BUN 14   CR 0.66   ANIONGAP  6   STEVE 8.0*   *   ALBUMIN 2.7*   PROTTOTAL 6.9   BILITOTAL 0.2   ALKPHOS 80   ALT 30   AST 20       Imaging:   Recent Results (from the past 24 hour(s))   Chest XR,  PA & LAT    Narrative    XR CHEST TWO VIEWS   10/7/2019 8:32 PM     HISTORY: Aspiration pneumonia.    COMPARISON: 9/27/2019.      Impression    IMPRESSION: Some patchy opacities at the right lung base may be  atelectasis or new airspace disease. Hypoinflated lungs with stable  mild elevation of the right hemidiaphragm. Normal cardiac silhouette.    ZAIRE HILL MD

## 2019-10-08 NOTE — PROGRESS NOTES
[facility] Nurse Inpatient Skin Assessment     Initial Assessment of:   Buttocks/ coccyx/ sacrum        Data:   Patient History:      per MD note(s):  complicated history of frequent infections and sepsis. Admitted 8/12/19-8/16/19 for septic shock related to UTI, discharged on 7 day course of IV Levaquin which he completed mid August. Had fever of 104 9/13 which prompted an observation admission, respiratory virus panel was negative at that time and no source of fever found. Discharged on PO levaquin on 9/15/19. In the past 24 hours, he was a little more tired, did not go to his program, but otherwise was no different. Mother checked his temperature and saw temp of 103 at home and called EMS. He then vomited and likely aspirated per his mother.     10-7-19: Keyon Farias is a 59 year old male with past medical history significant for traumatic brain injury with residual aphasia and right-sided spastic hemiplegia, seizure disorder, history of panhypopituitarism, hypothyroidism, GERD, dysphagia status post PEG tube placement and recurrent aspiration pneumonia requiring multiple hospitalizations to the hospital was recently discharged from the hospital on September 24 who was admitted yesterday due to the concern for possible aspiration pneumonitis versus pneumonia.     Ricci Risk Assessment  Sensory Perception: 2-->very limited    Moisture: 2-->very moist   Activity: 1-->bedfast     Mobility: 1-->completely immobile   Nutrition: 2-->probably inadequate   Friction and Shear: 1-->problem  Ricci Score: 9       Positioning: Pillows,     Mattress: Pulsate ordered      Moisture Management:  Incontinence Protocol and Diaper      Current Diet / Nutrition:   TF    Labs:   Recent Labs   Lab Test 10/08/19  0917 10/07/19  2004  08/12/19  2200  01/31/19  1324  02/07/17  0452   ALBUMIN  --  2.7*   < >  --    < > 2.9*   < >  --    HGB 9.7* 10.5*   < >  --    < > 12.0*   < > 9.4*   INR  --   --   --   --   --    --   --  1.27*   WBC 8.5 13.8*   < >  --    < > 13.6*   < > 11.7*   A1C  --   --   --  6.1*   < >  --    < >  --    CRP  --   --   --   --   --  71.1*  --   --     < > = values in this interval not displayed.           Skin Assessment (location):   Buttocks, buttocks fold  sacrum, coccyx   , [facility] photo: September 17, 2019      [facility] Photo: 10-8-19    Objective data:      Skin:  intact looking with a large patch of bright but lighter erythema, dry  tissue centered along gluteal cleft to bilateral fleshy buttocks, satellite lesions with similar coloration. Rash now extended down to Rt buttocks fold.  No open areas noted today  Tissue is very tender with cares            Intervention:     Patient's chart evaluated.      Assessed: buttocks with RN as noted above    Orders  Written    Supplies  Reviewed and discussed with pt and RN    Discussed plan of care with Patient and Nurse           Assessment:        Tissue on buttocks: Ongoing fungal infection with little improvement and advancement into Rt gluteal fold  since last hospitalization            Plan:   Nursing to notify the Provider(s) and re-consult the [facility] Nurse if skin deteriorate(s).    Skin care plan to buttocks: TID and prn  1. Provide thoroughly perineal cares- using Rita Cleanse and Protect  2. Dust with antifungal powder, lightly rub in including buttocks folds and groin   4.  Lightly apply Rita antifungal over powder   - for perineal cares at other times try to not disturb the creams and medications, cleaning around them as much as able.  PIP:   - leave brief on loose or off when in bed  - keep pt positioned side to side at all times, q 1-2 hours  - keep heels elevated at all times, one pillow under each leg, and assure heels floating  -HOB @ 30 degrees for TF  -pulsate mattress          [facility] Nurse will return: weekly and prn

## 2019-10-08 NOTE — PLAN OF CARE
Pt alert. Lift team assistance. Non-verbal. Tele SR. Lungs clear Sats WNL on RA. Continuous enteral feedings. Condom cath placed for incontinence. Fecally incontinent per shift. Plan is to maintain IVF. Antifungal cream and powder ordered for coccyx rash. Repo q 2. Keep HOB elevated above 30 degrees.

## 2019-10-09 LAB
ANION GAP SERPL CALCULATED.3IONS-SCNC: 5 MMOL/L (ref 3–14)
BACTERIA SPEC CULT: NO GROWTH
BUN SERPL-MCNC: 9 MG/DL (ref 7–30)
CALCIUM SERPL-MCNC: 8.4 MG/DL (ref 8.5–10.1)
CHLORIDE SERPL-SCNC: 105 MMOL/L (ref 94–109)
CO2 SERPL-SCNC: 26 MMOL/L (ref 20–32)
CREAT SERPL-MCNC: 0.64 MG/DL (ref 0.66–1.25)
GFR SERPL CREATININE-BSD FRML MDRD: >90 ML/MIN/{1.73_M2}
GLUCOSE BLDC GLUCOMTR-MCNC: 113 MG/DL (ref 70–99)
GLUCOSE SERPL-MCNC: 95 MG/DL (ref 70–99)
HGB BLD-MCNC: 11.1 G/DL (ref 13.3–17.7)
Lab: NORMAL
MAGNESIUM SERPL-MCNC: 2.2 MG/DL (ref 1.6–2.3)
PHOSPHATE SERPL-MCNC: 1.8 MG/DL (ref 2.5–4.5)
POTASSIUM SERPL-SCNC: 3.7 MMOL/L (ref 3.4–5.3)
SODIUM SERPL-SCNC: 136 MMOL/L (ref 133–144)
SPECIMEN SOURCE: NORMAL
WBC # BLD AUTO: 6.9 10E9/L (ref 4–11)

## 2019-10-09 PROCEDURE — 36415 COLL VENOUS BLD VENIPUNCTURE: CPT | Performed by: INTERNAL MEDICINE

## 2019-10-09 PROCEDURE — 25000125 ZZHC RX 250: Performed by: INTERNAL MEDICINE

## 2019-10-09 PROCEDURE — 12000000 ZZH R&B MED SURG/OB

## 2019-10-09 PROCEDURE — 99232 SBSQ HOSP IP/OBS MODERATE 35: CPT | Performed by: INTERNAL MEDICINE

## 2019-10-09 PROCEDURE — 00000146 ZZHCL STATISTIC GLUCOSE BY METER IP

## 2019-10-09 PROCEDURE — 94640 AIRWAY INHALATION TREATMENT: CPT

## 2019-10-09 PROCEDURE — 40000275 ZZH STATISTIC RCP TIME EA 10 MIN

## 2019-10-09 PROCEDURE — 27210429 ZZH NUTRITION PRODUCT INTERMEDIATE LITER

## 2019-10-09 PROCEDURE — 85018 HEMOGLOBIN: CPT | Performed by: INTERNAL MEDICINE

## 2019-10-09 PROCEDURE — 25000132 ZZH RX MED GY IP 250 OP 250 PS 637: Mod: GY | Performed by: INTERNAL MEDICINE

## 2019-10-09 PROCEDURE — 25800030 ZZH RX IP 258 OP 636: Performed by: INTERNAL MEDICINE

## 2019-10-09 PROCEDURE — 84100 ASSAY OF PHOSPHORUS: CPT | Performed by: INTERNAL MEDICINE

## 2019-10-09 PROCEDURE — 85048 AUTOMATED LEUKOCYTE COUNT: CPT | Performed by: INTERNAL MEDICINE

## 2019-10-09 PROCEDURE — 99207 ZZC CDG-MDM COMPONENT: MEETS MODERATE - UP CODED: CPT | Performed by: INTERNAL MEDICINE

## 2019-10-09 PROCEDURE — 94640 AIRWAY INHALATION TREATMENT: CPT | Mod: 76

## 2019-10-09 PROCEDURE — 80048 BASIC METABOLIC PNL TOTAL CA: CPT | Performed by: INTERNAL MEDICINE

## 2019-10-09 PROCEDURE — 83735 ASSAY OF MAGNESIUM: CPT | Performed by: INTERNAL MEDICINE

## 2019-10-09 RX ADMIN — POTASSIUM & SODIUM PHOSPHATES POWDER PACK 280-160-250 MG 1 PACKET: 280-160-250 PACK at 21:42

## 2019-10-09 RX ADMIN — CARBAMAZEPINE 150 MG: 100 SUSPENSION ORAL at 06:41

## 2019-10-09 RX ADMIN — CHOLECALCIFEROL TAB 50 MCG (2000 UNIT) 2000 UNITS: 50 TAB at 09:27

## 2019-10-09 RX ADMIN — LEVOTHYROXINE SODIUM 150 MCG: 150 TABLET ORAL at 09:27

## 2019-10-09 RX ADMIN — Medication 40 MG: at 08:36

## 2019-10-09 RX ADMIN — METOCLOPRAMIDE 5 MG: 5 SOLUTION ORAL at 21:43

## 2019-10-09 RX ADMIN — BRIVARACETAM 100 MG: 10 SOLUTION ORAL at 08:35

## 2019-10-09 RX ADMIN — MELATONIN 6 MG: 3 TAB ORAL at 21:42

## 2019-10-09 RX ADMIN — POTASSIUM & SODIUM PHOSPHATES POWDER PACK 280-160-250 MG 1 PACKET: 280-160-250 PACK at 09:26

## 2019-10-09 RX ADMIN — MICONAZOLE NITRATE: 20 POWDER TOPICAL at 08:36

## 2019-10-09 RX ADMIN — BRIVARACETAM 100 MG: 10 SOLUTION ORAL at 21:42

## 2019-10-09 RX ADMIN — METOCLOPRAMIDE 5 MG: 5 SOLUTION ORAL at 08:36

## 2019-10-09 RX ADMIN — Medication 1 PACKET: at 09:26

## 2019-10-09 RX ADMIN — ACETYLCYSTEINE 2 ML: 200 SOLUTION ORAL; RESPIRATORY (INHALATION) at 19:35

## 2019-10-09 RX ADMIN — CARBAMAZEPINE 150 MG: 100 SUSPENSION ORAL at 17:26

## 2019-10-09 RX ADMIN — MICONAZOLE NITRATE: 20 CREAM TOPICAL at 12:20

## 2019-10-09 RX ADMIN — MICONAZOLE NITRATE: 20 POWDER TOPICAL at 21:44

## 2019-10-09 RX ADMIN — POTASSIUM & SODIUM PHOSPHATES POWDER PACK 280-160-250 MG 1 PACKET: 280-160-250 PACK at 17:26

## 2019-10-09 RX ADMIN — Medication 0.4 MG: at 17:28

## 2019-10-09 RX ADMIN — MICONAZOLE NITRATE: 20 CREAM TOPICAL at 09:27

## 2019-10-09 RX ADMIN — MICONAZOLE NITRATE: 20 CREAM TOPICAL at 21:44

## 2019-10-09 RX ADMIN — ACETYLCYSTEINE 2 ML: 200 SOLUTION ORAL; RESPIRATORY (INHALATION) at 07:29

## 2019-10-09 RX ADMIN — Medication 1 PACKET: at 09:27

## 2019-10-09 RX ADMIN — HYDROCORTISONE 10 MG: 10 TABLET ORAL at 17:25

## 2019-10-09 RX ADMIN — ALBUTEROL SULFATE 2.5 MG: 2.5 SOLUTION RESPIRATORY (INHALATION) at 19:35

## 2019-10-09 RX ADMIN — ALBUTEROL SULFATE 2.5 MG: 2.5 SOLUTION RESPIRATORY (INHALATION) at 22:31

## 2019-10-09 RX ADMIN — DEXTROSE AND SODIUM CHLORIDE: 5; 900 INJECTION, SOLUTION INTRAVENOUS at 10:56

## 2019-10-09 RX ADMIN — Medication 0.4 MG: at 09:40

## 2019-10-09 RX ADMIN — CARBAMAZEPINE 150 MG: 100 SUSPENSION ORAL at 12:14

## 2019-10-09 RX ADMIN — CARBAMAZEPINE 150 MG: 100 SUSPENSION ORAL at 00:12

## 2019-10-09 RX ADMIN — MINERAL SUPPLEMENT IRON 300 MG / 5 ML STRENGTH LIQUID 100 PER BOX UNFLAVORED 220 MG: at 10:56

## 2019-10-09 RX ADMIN — Medication 0.4 MG: at 21:44

## 2019-10-09 RX ADMIN — ASPIRIN 81 MG 81 MG: 81 TABLET ORAL at 09:27

## 2019-10-09 RX ADMIN — MICONAZOLE NITRATE: 20 CREAM TOPICAL at 09:59

## 2019-10-09 RX ADMIN — MICONAZOLE NITRATE: 20 POWDER TOPICAL at 16:06

## 2019-10-09 RX ADMIN — ALBUTEROL SULFATE 2.5 MG: 2.5 SOLUTION RESPIRATORY (INHALATION) at 07:28

## 2019-10-09 RX ADMIN — MICONAZOLE NITRATE: 20 CREAM TOPICAL at 00:12

## 2019-10-09 RX ADMIN — MICONAZOLE NITRATE: 20 CREAM TOPICAL at 16:05

## 2019-10-09 RX ADMIN — HYDROCORTISONE 20 MG: 20 TABLET ORAL at 09:27

## 2019-10-09 ASSESSMENT — ACTIVITIES OF DAILY LIVING (ADL)
ADLS_ACUITY_SCORE: 45
ADLS_ACUITY_SCORE: 47
ADLS_ACUITY_SCORE: 45

## 2019-10-09 NOTE — PROVIDER NOTIFICATION
Pt on room air sat 96% Lung sounds some coarse. Pt did cough for me.Taking nebs.Aeration noted post neb treatment.     10/09/19 0729   Oxygen Therapy   SpO2 96 %   O2 Device None (Room air)   Nebulizer Pre Assessment   %RT Use ONLY Delivery Method Nebulizer - Initial   Nebulizer Device Mask   Pretreatment Heart Rate (beats/min) 80   Pretreatment Resp Rate (breaths/min) 20   Pretreatment O2 sats - (TCU only) 96   Pretreat Breath Sounds - Bilat - All Lobes coarse   Breath Sounds Post-Respiratory Treatment   Posttreatment Resp Rate (breaths/min) 22   Post treatment O2 Sats - (TCU only) 100   Throughout All Fields Post-Treatment aeration increased   Cj  RT

## 2019-10-09 NOTE — PLAN OF CARE
DATE & TIME: 10/9/19 0613-0000   Cognitive Concerns/ Orientation : HECTOR, nonverbal  BEHAVIOR & AGGRESSION TOOL COLOR: yellow; can make a fist and resist cares to perineum  CIWA SCORE:    ABNL VS/O2: afebrile; VSS  MOBILITY: bedrest, turned and repositioned  PAIN MANAGMENT: no s/s pain  DIET: NPO; J tube feedings 7 am to 7 pm,  tolerating  BOWEL/BLADDER: incontinent of large amounts of urine and incontinent of loose BM x 1  ABNL LAB/BG: phos 1.5, increased phos dose   DRAIN/DEVICES: PIV STEVO; J tube; external urinary cath  TELEMETRY RHYTHM: NSR  SKIN: red excoriated buttock; miconazole powder and sarha applied as ordered with each cares  TESTS/PROCEDURES:   D/C DAY/GOALS/PLACE: hopefully tomorrow at 10 am  OTHER IMPORTANT INFO: continues on IVF

## 2019-10-09 NOTE — PROGRESS NOTES
[facility]    HOSPITALIST PROGRESS NOTE :   --------------------------------------------------    Date of Admission:  10/7/2019    Cumulative Summary: Keyon Farias is a 59 year old male with past medical history significant for traumatic brain injury with residual aphasia and right-sided spastic hemiplegia, seizure disorder, history of panhypopituitarism, hypothyroidism, GERD, dysphagia status post PEG tube placement and recurrent aspiration pneumonia requiring multiple hospitalizations to the hospital was recently discharged from the hospital on September 24 who was admitted yesterday due to the concern for possible aspiration pneumonitis versus pneumonia.    Assessment & Plan     Active Problems:  Aspiration, chronic pulmonary, initial encounter (10/7/2019):' Patient is at high risk for recurrent aspiration pneumonia so far this is his 17th hospitalization this year.  Most of his presentations are almost identical, presenting with fever of 10 2-1 03 range.  Patient was brought to the ER last night and was noted to have possible right-sided infiltrate concerning for aspiration pneumonia versus pneumonitis.  Patient is usually not hypoxic during his presentations.  There is also concerned that sometimes patient also had episodes of high fevers with lip smacking resulting in presentation to the ER event despite ongoing outpatient antibiotics from previous hospitalization which was the case at his last admission as well.  In the ER patient received IV vancomycin and Zosyn, Dr. Jerald Villavicencio had lengthy discussion with patient mother at bedside regarding possibility of aspiration pneumonitis and consideration for monitoring him closely while not giving further antibiotics.  Patient has been monitored closely , so far he is not spiking fever and his WBC counts are stable , mother remains worried and wanted to make sure patient is not discharged early so he does not develop fever after reaching home   No  temp >38 in last 24 hours     --Continue to monitor patient closely.  --will continue to hold antibiotics at this time but will monitor patient closely for signs and symptoms of infection and sepsis.  -- Checked procalcitonin which came back low   --Oximetry and oxygen only if needed.  --Strict n.p.o., tube feedings only including medications through tube only.   -- discussed with RD, will increase his Neutra phos to TID from daily due to low phosphorus    --Resume scopolamine powder if available, patient with a rash to scopolamine patch, though this was beneficial for patient in the past.  Mother will bring the powder to Scopolamine in a.m.  --Keep head of the bed greater than 30 degrees.  --Section frequently though patient typically refuses oral suctioning.  --We will follow-up on blood culture results, so far negative   --If able to produce a sputum sample, can obtain Gram stain and culture but usually patient does not typically expectorate.  --Continue albuterol and Mucomyst nebulizer treatments.  --If patient starts to spike fever, then probably can be started on antibiotics.    Lipsmacking behavior: Mostly this is typical for patient prior to fevers.  Potentially represents partial seizures patient can be alert during these episodes and is able to follow commands.  Prolactin obtained in the ER was low.  Would be unusual to see fever of 102 in the setting of partial seizure so I do not think that seizure resulted in his current presentation completely.  Epilepsy: Follows with Dr. Phylicia Stout through [facility], thought to be secondary to TBI.    --Plan for outpatient follow-up with patient's primary neurologist/epilepsy clinic in November as a schedule.   -- Continue prior to admission Tegretol,Briviact.  --Seizure precautions.    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  -Continue prior to admission hydrocortisone 10 mg q. evening, 20 mg every morning.  Have not initiated stress dose  steroids at this time as patient is vitally stable  -Continue prior to admission levothyroxine 150 mg daily  -Continue prior to admission testosterone supplementation at discharge     Perianal rash: Consistent with Candida in the setting of urinary incontinence.  Painful/uncomfortable for patient.  -Condom catheter  -Antifungal cream  -Wound consult given increased risk of skin breakdown  -Frequent repositioning     Mild hyponatremia:  -Nutrition consulted for reinitiation of tube feeding    Diet: NPO for Medical/Clinical Reasons Except for: No Exceptions  Adult Formula Drip Feeding: Continuous Isosource 1.5; Jejunostomy; Goal Rate: 100; mL/hr; From: 7:00 AM; 7:00 PM; Medication - Feeding Tube Flush Frequency: At least 15-30 mL water before and after medication administration and with tube clogging;...    Lerma Catheter: not present  DVT Prophylaxis: Pneumatic Compression Devices  Code Status: Full Code    Tried to call mother to update her regarding Keyon clinical status and plan for possible discharge tomorrow , left voice message for her to call back     Disposition Plan   Expected discharge : tomorrow if patient remains afebrile  ,despite patient being not on antibiotics , considering lots of comorbidities and him being at high risk of developing fever and sepsis he would benefit from staying in hospital for more than 2 nights and getting monitored closely for signs and symptoms of infection.    Yanely Liriano MD, FACP  Text Page (7am - 6pm)    ----------------------------------------------------------------------------------------------------------------------    Interval History   Patient was seen and examined, looks comfortable, non verbal at baseline , afebrile , discussed with RD about adjusting his neutraphos , he gave thumbs up regarding plan for going home.      -Data reviewed today: I reviewed all new labs and imaging results over the last 24 hours.    I personally reviewed no images or EKG's  today.    Physical Exam   Temp: 98  F (36.7  C) Temp src: Axillary BP: 103/64 Pulse: 89 Heart Rate: 62 Resp: 14 SpO2: 97 % O2 Device: None (Room air)    Vitals:    10/08/19 0614 10/09/19 0645   Weight: 74.3 kg (163 lb 12.8 oz) 67.6 kg (149 lb 0.5 oz)     Vital Signs with Ranges  Temp:  [97.6  F (36.4  C)-99.5  F (37.5  C)] 98  F (36.7  C)  Pulse:  [89] 89  Heart Rate:  [62-77] 62  Resp:  [14-16] 14  BP: ()/(56-69) 103/64  SpO2:  [94 %-97 %] 97 %  I/O last 3 completed shifts:  In: 490 [NG/GT:490]  Out: 1100 [Urine:1100]    GENERAL: Alert , awake , non verbal at baseline . NAD. appropriate.   HEENT: Normocephalic. EOMI. No icterus or injection. Nares normal.   LUNGS: Clear to auscultation. No dyspnea at rest.   HEART: Regular rate. Extremities perfused.   ABDOMEN: Soft, nontender, and nondistended. Positive bowel sounds. Feeding tube in place   EXTREMITIES: 1+B/L lower ext edema   NEUROLOGIC: Moves extremities x4 on command. No acute focal neurologic abnormalities noted.     Medications     IV fluid REPLACEMENT ONLY       dextrose 5% and 0.9% NaCl 100 mL/hr at 10/09/19 1056       acetylcysteine  2 mL Nebulization Q6H     albuterol  2.5 mg Nebulization Q4H While awake     aspirin  81 mg Oral or Feeding Tube Daily     Brivaracetam  100 mg Oral or Feeding Tube BID     carBAMazepine  150 mg Oral or Feeding Tube Q6H     ferrous sulfate  220 mg Per Feeding Tube Daily     fiber modular (NUTRISOURCE FIBER)  1 packet Per G Tube Daily     hydrocortisone  10 mg Oral or Feeding Tube Daily with supper     hydrocortisone  20 mg Oral or Feeding Tube QAM     levothyroxine  150 mcg Oral or Feeding Tube QAM     melatonin  6 mg Per NG tube At Bedtime     metoclopramide  5 mg Per Feeding Tube BID     miconazole   Topical TID     miconazole with skin protectant   Topical TID     pantoprazole  40 mg Per J Tube Daily     potassium & sodium phosphates  1 packet Oral TID     protein modular  1 packet Per Feeding Tube Daily      Scopolamine HBr  0.4 mg Oral or Feeding Tube TID     sodium chloride (PF)  3 mL Intracatheter Q8H     vitamin D3  2,000 Units Oral or Feeding Tube Daily       Data   Recent Labs   Lab 10/09/19  0852 10/08/19  0917 10/07/19  2004   WBC 6.9 8.5 13.8*   HGB 11.1* 9.7* 10.5*   MCV  --  83 83   PLT  --  175 212    132* 129*   POTASSIUM 3.7 3.9 4.1   CHLORIDE 105 102 95   CO2 26 25 28   BUN 9 12 14   CR 0.64* 0.73 0.66   ANIONGAP 5 5 6   STEVE 8.4* 7.4* 8.0*   GLC 95 105* 115*   ALBUMIN  --   --  2.7*   PROTTOTAL  --   --  6.9   BILITOTAL  --   --  0.2   ALKPHOS  --   --  80   ALT  --   --  30   AST  --   --  20       Imaging:   No results found for this or any previous visit (from the past 24 hour(s)).

## 2019-10-09 NOTE — CONSULTS
Care Transition Initial Assessment - RN        Called pt's Mother (and also Guardian) Savannah.  Pt is not able to communicate.      DATA   Active Problems:    Aspiration, chronic pulmonary, initial encounter       Cognitive Status: Nonverbal, able to five thumbs up   Contact information and PCP information verified: Yes  Lives With: parent(s)      Insurance concerns: No Insurance issues identified  ASSESSMENT  Pt lives with his mother and pt's PCA also lives with them.  His mother just returned from a family  in [place], so she sounded exhausted.  Dr Liriano had tried a few times to call her today, and she did receive the message that was left.  Patient currently receives the following services: [facility] OT/[phone])  [facility] [phone]) RN 12 Hr coverage daily and 12 hr PCA adarsh/night thorugh [facility] (currently 80.5 hours every 7 days) and TF with supplies through [facility].    Pt has had 16 admissions this year.  All measures have been put in place to prevent this.    Planning hospital discharge to home at 1000 AM tomorrow.  Transportation has been set up through [employer] via stretcher.  Savannah will alert [facility] this adarsh.  Writer will fax orders to them in the AM.  Will need orders to resume his home OT and PT.       Care  (CTS) will continue to follow as needed.

## 2019-10-09 NOTE — PLAN OF CARE
DATE & TIME: 10/8/19, 7733 - 7027                         Cognitive Concerns/ Orientation : HECTOR, Nonverbal   BEHAVIOR & AGGRESSION TOOL COLOR: Green  CIWA SCORE: N/a    ABNL VS/O2: VSS on room air  MOBILITY: Up with lift assist x 2. Turned and repositioned  PAIN MANAGMENT: Grimaces and tries to bat/move away hands during memo cares  DIET: NPO. Tube feeding  BOWEL/BLADDER: Incontinent of B/B  ABNL LAB/BG: N/a  DRAIN/DEVICES: PIV infusing at 100 ml/hr. GJ-tube in place with feeds at 100 ml/hr and water flushes of 60 ml q4h  TELEMETRY RHYTHM: Sinus Bradycardia   SKIN: Excoriated red wound to buttocks. Rash to groin  TESTS/PROCEDURES: N/a  D/C DAY/GOALS/PLACE: Pending  OTHER IMPORTANT INFO: Memo care done and prn antifungal cream and powder applied. Patient grimacing and resistive during memo care. Contact precautions maintained

## 2019-10-09 NOTE — PLAN OF CARE
DATE & TIME: 10/8/19 7652-2543  Cognitive Concerns/ Orientation : HECTOR orientation, nonverbal   BEHAVIOR & AGGRESSION TOOL COLOR: Green   ABNL VS/O2: VSS on RA  MOBILITY: Lift, fall risk. Reposition q2h.  PAIN MANAGMENT: Grimaces when memo cares done due to wound on buttock  DIET: NPO, TF   BOWEL/BLADDER: Incontinent of bowel and bladder. Attempted to use condom catheter pt continued to rip off and wet the bed.  DRAIN/DEVICES: IV SL  TELEMETRY RHYTHM: NSR  SKIN: Wound on buttock. Barrier cream applied. Frequent repositioning. Antifungal powder applied to groin.   D/C DAY/GOALS/PLACE: Discharge pending progress  OTHER IMPORTANT INFO: Lung sounds clear, maintaining sats. TF stopped at 2230. Pt became nauseous, scheduled Reglan was given with relief. HOB 30 degrees. IVF infusing. However, lost IV access flying squad placing now.

## 2019-10-09 NOTE — PROGRESS NOTES
CLINICAL NUTRITION SERVICES BRIEF NOTE  Refer to RD note dated 10/8 for full assessment/EN restart per home regimen.     New Findings:   - Phos 1.9 (L) today.   - At baseline, pt receives Neutra-Phos packets TID however it is only ordered 1x daily.     - Recommend increase frequency of Neutra-phos, D/w Dr. Liriano.     RD will continue to follow.     Marisel Fernández RD,   [facility] [place], 68, 57, MH   Pager: [phone]  Weekend Pager: [phone]

## 2019-10-10 ENCOUNTER — APPOINTMENT (OUTPATIENT)
Dept: GENERAL RADIOLOGY | Facility: CLINIC | Age: 59
DRG: 178 | End: 2019-10-10
Attending: INTERNAL MEDICINE
Payer: MEDICARE

## 2019-10-10 VITALS
WEIGHT: 152.12 LBS | SYSTOLIC BLOOD PRESSURE: 104 MMHG | RESPIRATION RATE: 18 BRPM | BODY MASS INDEX: 20.63 KG/M2 | DIASTOLIC BLOOD PRESSURE: 64 MMHG | TEMPERATURE: 98.1 F | HEART RATE: 69 BPM | OXYGEN SATURATION: 95 %

## 2019-10-10 LAB
ANION GAP SERPL CALCULATED.3IONS-SCNC: 3 MMOL/L (ref 3–14)
BUN SERPL-MCNC: 9 MG/DL (ref 7–30)
CALCIUM SERPL-MCNC: 8.1 MG/DL (ref 8.5–10.1)
CHLORIDE SERPL-SCNC: 105 MMOL/L (ref 94–109)
CO2 SERPL-SCNC: 28 MMOL/L (ref 20–32)
CREAT SERPL-MCNC: 0.62 MG/DL (ref 0.66–1.25)
GFR SERPL CREATININE-BSD FRML MDRD: >90 ML/MIN/{1.73_M2}
GLUCOSE SERPL-MCNC: 109 MG/DL (ref 70–99)
PHOSPHATE SERPL-MCNC: 2.9 MG/DL (ref 2.5–4.5)
PHOSPHATE SERPL-MCNC: NORMAL MG/DL (ref 2.5–4.5)
POTASSIUM SERPL-SCNC: 3.5 MMOL/L (ref 3.4–5.3)
SODIUM SERPL-SCNC: 136 MMOL/L (ref 133–144)
WBC # BLD AUTO: 6.7 10E9/L (ref 4–11)

## 2019-10-10 PROCEDURE — 94640 AIRWAY INHALATION TREATMENT: CPT

## 2019-10-10 PROCEDURE — 27210429 ZZH NUTRITION PRODUCT INTERMEDIATE LITER

## 2019-10-10 PROCEDURE — 36415 COLL VENOUS BLD VENIPUNCTURE: CPT | Performed by: INTERNAL MEDICINE

## 2019-10-10 PROCEDURE — 25000132 ZZH RX MED GY IP 250 OP 250 PS 637: Mod: GY | Performed by: INTERNAL MEDICINE

## 2019-10-10 PROCEDURE — 80048 BASIC METABOLIC PNL TOTAL CA: CPT | Performed by: INTERNAL MEDICINE

## 2019-10-10 PROCEDURE — 99239 HOSP IP/OBS DSCHRG MGMT >30: CPT | Performed by: INTERNAL MEDICINE

## 2019-10-10 PROCEDURE — 25000125 ZZHC RX 250: Performed by: INTERNAL MEDICINE

## 2019-10-10 PROCEDURE — 71046 X-RAY EXAM CHEST 2 VIEWS: CPT

## 2019-10-10 PROCEDURE — 84100 ASSAY OF PHOSPHORUS: CPT | Performed by: INTERNAL MEDICINE

## 2019-10-10 PROCEDURE — 85048 AUTOMATED LEUKOCYTE COUNT: CPT | Performed by: INTERNAL MEDICINE

## 2019-10-10 PROCEDURE — 94640 AIRWAY INHALATION TREATMENT: CPT | Mod: 76

## 2019-10-10 PROCEDURE — 40000275 ZZH STATISTIC RCP TIME EA 10 MIN

## 2019-10-10 RX ADMIN — CHOLECALCIFEROL TAB 50 MCG (2000 UNIT) 2000 UNITS: 50 TAB at 08:11

## 2019-10-10 RX ADMIN — ALBUTEROL SULFATE 2.5 MG: 2.5 SOLUTION RESPIRATORY (INHALATION) at 03:55

## 2019-10-10 RX ADMIN — Medication 40 MG: at 08:17

## 2019-10-10 RX ADMIN — Medication 1 PACKET: at 08:11

## 2019-10-10 RX ADMIN — MICONAZOLE NITRATE: 20 POWDER TOPICAL at 09:34

## 2019-10-10 RX ADMIN — ALBUTEROL SULFATE 2.5 MG: 2.5 SOLUTION RESPIRATORY (INHALATION) at 08:30

## 2019-10-10 RX ADMIN — CARBAMAZEPINE 150 MG: 100 SUSPENSION ORAL at 06:37

## 2019-10-10 RX ADMIN — LEVOTHYROXINE SODIUM 150 MCG: 150 TABLET ORAL at 08:11

## 2019-10-10 RX ADMIN — ACETYLCYSTEINE 2 ML: 200 SOLUTION ORAL; RESPIRATORY (INHALATION) at 08:29

## 2019-10-10 RX ADMIN — CARBAMAZEPINE 150 MG: 100 SUSPENSION ORAL at 01:09

## 2019-10-10 RX ADMIN — Medication 0.4 MG: at 08:19

## 2019-10-10 RX ADMIN — MICONAZOLE NITRATE: 20 CREAM TOPICAL at 09:34

## 2019-10-10 RX ADMIN — METOCLOPRAMIDE 5 MG: 5 SOLUTION ORAL at 08:18

## 2019-10-10 RX ADMIN — ACETYLCYSTEINE 2 ML: 200 SOLUTION ORAL; RESPIRATORY (INHALATION) at 03:55

## 2019-10-10 RX ADMIN — POTASSIUM & SODIUM PHOSPHATES POWDER PACK 280-160-250 MG 1 PACKET: 280-160-250 PACK at 08:11

## 2019-10-10 RX ADMIN — BRIVARACETAM 100 MG: 10 SOLUTION ORAL at 09:33

## 2019-10-10 RX ADMIN — HYDROCORTISONE 20 MG: 20 TABLET ORAL at 08:11

## 2019-10-10 RX ADMIN — ASPIRIN 81 MG 81 MG: 81 TABLET ORAL at 08:11

## 2019-10-10 ASSESSMENT — ACTIVITIES OF DAILY LIVING (ADL)
ADLS_ACUITY_SCORE: 45

## 2019-10-10 NOTE — DISCHARGE SUMMARY
[facility]  Hospitalist Discharge Summary       Date of Admission:  10/7/2019  Date of Discharge:  10/10/2019  Discharging Provider: Yanely Liriano MD      Discharge Diagnoses   Acute on chronic aspiration pneumonitis without pneumonia.  Lipsmacking behavior, resolved.  Panhypopituitarism.  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  Mild hyponatremia, resolved.    Follow-ups Needed After Discharge   Follow-up Appointments     Follow-up and recommended labs and tests      Follow up with primary care provider, Carlos Gomez, as scheduled for you   on Tuesday 10/15/19 at 1:00PM, (please arrive at 12:50PM), to evaluate   medication change, for hospital follow- up and to follow up on results.    No follow up labs or test are needed.             Unresulted Labs Ordered in the Past 30 Days of this Admission     Date and Time Order Name Status Description    10/7/2019 1928 Blood culture Preliminary     10/7/2019 1928 Blood culture Preliminary       These results will be followed up by PCP    Discharge Disposition   Discharged to home  Condition at discharge: Stable    Hospital Course   Cumulative Summary: Keyon Farias is a 59 year old male with past medical history significant for traumatic brain injury with residual aphasia and right-sided spastic hemiplegia, seizure disorder, history of panhypopituitarism, hypothyroidism, GERD, dysphagia status post PEG tube placement and recurrent aspiration pneumonia requiring multiple hospitalizations to the hospital was recently discharged from the hospital on September 24 who was admitted yesterday due to the concern for possible aspiration pneumonitis versus pneumonia.      Assessment & Plan   Active Problems:  Aspiration, chronic pulmonary, initial encounter (10/7/2019):' Patient is at high risk for  recurrent aspiration pneumonia so far this is his 17th hospitalization this year.  Most of his presentations are almost identical, presenting with fever of 10 2-1 03 range.  Patient was brought to the ER last night and was noted to have possible right-sided infiltrate concerning for aspiration pneumonia versus pneumonitis.  Patient is usually not hypoxic during his presentations.  There is also concerned that sometimes patient also had episodes of high fevers with lip smacking resulting in presentation to the ER event despite ongoing outpatient antibiotics from previous hospitalization which was the case at his last admission as well.  In the ER patient received IV vancomycin and Zosyn, Dr. Jerald Villavicencio had lengthy discussion with patient mother at bedside regarding possibility of aspiration pneumonitis and consideration for monitoring him closely while not giving further antibiotics.  Patient has been monitored closely , so far he is not spiking fever and his WBC counts are stable , mother remains worried and wanted to make sure patient is not discharged early so he does not develop fever after reaching home   No temp >38 for more than 48 hours now, WBC is in normal range .  During the hospitalization his procalcitonin also came back low and patient was monitored without antibiotics for the past couple of days he has only received 1 dose of antibiotic in the ER.    --Patient was seen and examined, doing very well clinically this morning, afebrile, WBC is in normal range.  --Mother is requesting chest x-ray to be done this morning, was ordered, did have long discussion with mother regarding patient currently having no signs and symptoms of infection and most likely his initial presentation was secondary to pneumonitis from aspiration episode.  Savannah is in agreement that patient does not need any antibiotics at this point.  --Home health care will be resumed on discharge, discussed with Savannah regarding following  aspiration precautions after the discharge.  --Patient will continue to receive tube feedings after the discharge.  --We also discussed with Savannah regarding increasing his Neutra-Phos to 3 times daily currently it has been decreased to once a day she felt more comfortable increasing it to twice a day, currently she does have Neutra-Phos at home and does not need prescription, will change the orders to twice a day from once a day on discharge.     Lipsmacking behavior: Mostly this is typical for patient prior to fevers.  Potentially represents partial seizures patient can be alert during these episodes and is able to follow commands.  Prolactin obtained in the ER was low.  Would be unusual to see fever of 102 in the setting of partial seizure so I do not think that seizure resulted in his current presentation completely.  Epilepsy: Follows with Dr. Phylicia Stout through [facility], thought to be secondary to TBI.     -- Plan for outpatient follow-up with patient's primary neurologist/epilepsy clinic in November as a schedule.   -- Continue prior to admission Tegretol,Briviact.  -- Seizure precautions.     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  -Continue prior to admission hydrocortisone 10 mg q. evening, 20 mg every morning.  Have not initiated stress dose steroids at this time as patient is vitally stable  -Continue prior to admission levothyroxine 150 mg daily  -Continue prior to admission testosterone supplementation at discharge     Perianal rash: Consistent with Candida in the setting of urinary incontinence.  Painful/uncomfortable for patient.improving     -Antifungal cream  -Frequent repositioning     Mild hyponatremia:  - resolved, continue tube feeding after discharge      Patient was seen and examined on the day of discharge , he looks well and non toxic, alert and awaken getting neb treatment, seems to be very excited about going home later this morning , no family was present in  room, I was able to get hold of Savannah , she is in agreement with the current plan for discharge , his HHC is resumed on discharge he is discharged in stable condition back to his home.      Consultations This Hospital Stay   WOUND OSTOMY CONTINENCE NURSE  IP CONSULT  NUTRITION SERVICES ADULT IP CONSULT  PHARMACY IP CONSULT  CARE TRANSITION RN/SW IP CONSULT    Code Status   Full Code    Time Spent on this Encounter   I, Yanely Liriano MD, personally saw the patient today and spent greater than 30 minutes discharging this patient.  More than 50% of the time was spent in counseling and coordination of care.  Mother was called 3 times this morning.       Yanely Liriano MD  [facility]  ______________________________________________________________________    Physical Exam   Vital Signs: Temp: 98.1  F (36.7  C) Temp src: Axillary BP: 104/64 Pulse: 69 Heart Rate: 81 Resp: 18 SpO2: 95 % O2 Device: None (Room air)    Weight: 152 lbs 1.88 oz    Physical Exam  Vitals signs and nursing note reviewed.   Constitutional:       Appearance: He is well-developed.      Comments: Non verbal, receiving his duo neb treatment, non toxic looking , comfortable, excited about going home    HENT:      Head: Normocephalic and atraumatic.   Eyes:      Pupils: Pupils are equal, round, and reactive to light.   Neck:      Musculoskeletal: Normal range of motion and neck supple.      Thyroid: No thyromegaly.   Cardiovascular:      Rate and Rhythm: Normal rate and regular rhythm.      Heart sounds: Normal heart sounds.   Pulmonary:      Effort: Pulmonary effort is normal. No respiratory distress.      Breath sounds: Normal breath sounds.   Abdominal:      General: Bowel sounds are normal. There is no distension.      Palpations: Abdomen is soft.      Comments: PEG tube in place    Musculoskeletal: Normal range of motion.         General: No tenderness.   Skin:     General: Skin is warm and dry.   Neurological:      Mental Status: He  is alert. Mental status is at baseline.   Psychiatric:         Behavior: Behavior normal.          Primary Care Physician   Carlos Gomez    Discharge Orders      Home care nursing referral      Reason for your hospital stay    You were admitted to the hospital sec to fever associated with pneumonitis but did not have any pneumonia.     Follow-up and recommended labs and tests    Follow up with primary care provider, Carlos Gomez, as scheduled for you on Tuesday 10/15/19 at 1:00PM, (please arrive at 12:50PM), to evaluate medication change, for hospital follow- up and to follow up on results.  No follow up labs or test are needed.     Activity    Your activity upon discharge: activity as tolerated     Discharge Instructions    Please continue your home medications and aspiration precautions after reaching home thanks     Full Code     Diet    Follow this diet upon discharge: Orders Placed This Encounter      Adult Formula Drip Feeding: Continuous Isosource 1.5; Jejunostomy; Goal Rate: 100; mL/hr; From: 7:00 AM; 7:00 PM; Medication - Feeding Tube Flush Frequency: At least 15-30 mL water before and after medication administration and with tube clogging;...      NPO for Medical/Clinical Reasons Except for: No Exceptions       Significant Results and Procedures   Results for orders placed or performed during the hospital encounter of 10/07/19   Chest XR,  PA & LAT    Narrative    XR CHEST TWO VIEWS   10/7/2019 8:32 PM     HISTORY: Aspiration pneumonia.    COMPARISON: 9/27/2019.      Impression    IMPRESSION: Some patchy opacities at the right lung base may be  atelectasis or new airspace disease. Hypoinflated lungs with stable  mild elevation of the right hemidiaphragm. Normal cardiac silhouette.    ZAIRE HILL MD       Discharge Medications   Discharge Medication List as of 10/10/2019 10:00 AM      CONTINUE these medications which have CHANGED    Details   potassium & sodium phosphates (NEUTRA-PHOS) 280-160-250 MG  Packet Take 1 packet by mouth 2 times daily 0900, Historical         CONTINUE these medications which have NOT CHANGED    Details   acetylcysteine (MUCOMYST) 20 % neb solution Take 2 mLs by nebulization every 6 hours With albuterol at 0700, 1100, 1500, and 1900, Historical      albuterol (PROVENTIL) (5 MG/ML) 0.5% neb solution Take 2.5 mg by nebulization every 4 hours (while awake) 0700 1100 1500 1900 with mucomyst , Historical      aspirin (ASA) 81 MG chewable tablet 81 mg by Oral or Feeding Tube route daily At 0900, Historical      bacitracin ointment Apply topically daily as needed for wound care To PEG site. Historical      Bioflavonoid Products (VITAMIN C PLUS) 1000 MG TABS 1 tablet by Oral or FT or NG tube route, Historical      Brivaracetam (BRIVIACT) 10 MG/ML solution 100 mg by Oral or Feeding Tube route 2 times daily 0900, 2100, Historical      calcium carbonate 1250 (500 CA) MG/5ML SUSP suspension 5 mLs (1,250 mg) by Per J Tube route 3 times daily (with meals), Disp-450 mL, Transitional      carBAMazepine (TEGRETOL) 100 MG/5ML suspension 150 mg by Oral or Feeding Tube route every 6 hours At 06:00, 12:00, 18:00 and 24:00 for seizures , Historical      ferrous sulfate 220 (44 Fe) MG/5ML ELIX 220 mg by Per Feeding Tube route daily, Historical      Guar Gum (FIBER MODULAR, NUTRISOURCE FIBER,) packet 1 packet by Per G Tube route daily, Disp-30 packet, R-0, E-Prescribe      !! hydrocortisone (CORTEF) 5 MG tablet 10 mg by Oral or FT or NG tube route daily (with dinner) At 1500, Historical      !! hydrocortisone (CORTEF) 5 MG tablet 20 mg by Oral or FT or NG tube route every morning , Historical      hydrocortisone 1 % CREA cream Place rectally 2 times daily as needed for other Apply to reddened memo areas as neededHistorical      ipratropium - albuterol 0.5 mg/2.5 mg/3 mL (DUONEB) 0.5-2.5 (3) MG/3ML neb solution Take 1 vial by nebulization every 4 hours as needed (bronchospasms or cough), Historical       levothyroxine (SYNTHROID/LEVOTHROID) 150 MCG tablet Take 150 mcg by mouth every morning, Historical      melatonin (MELATONIN) 1 MG/ML LIQD liquid 6 mg by Per NG tube route At Bedtime , Historical      metoclopramide (REGLAN) 5 MG/5ML solution 5 mg by Per Feeding Tube route 2 times daily, Historical      miconazole (MICATIN) 2 % AERP powder Apply topically 2 times daily as needed Historical      mupirocin (BACTROBAN) 2 % external ointment Apply topically 2 times daily as needed Historical      pantoprazole (PROTONIX) 2 mg/mL SUSP suspension 20 mLs (40 mg) by Per J Tube route daily, Disp-400 mL, R-1, E-Prescribe      Scopolamine HBr POWD 0.4 mg by Oral or Feeding Tube route 3 times daily, Historical      Skin Protectants, Misc. (BALMEX SKIN PROTECTANT) OINT Externally apply topically 2 times daily as needed (irritation) Applay to reddened memo areas twice daily as neededHistorical      testosterone cypionate (DEPOTESTOTERONE CYPIONATE) 200 MG/ML injection Inject 76 mg into the muscle See Admin Instructions Every 2 weeks on Fridays   76 mg or 0.38 mL, Historical      vitamin D3 (CHOLECALCIFEROL) 2000 units (50 mcg) tablet Take 2,000 Units by mouth daily Crush and feed via j-tube @@ 0900, Historical      order for DME Equipment being ordered: NebulizerDisp-1 Units, R-0, Local Print       !! - Potential duplicate medications found. Please discuss with provider.        Allergies   Allergies   Allergen Reactions     Dilantin [Phenytoin Sodium]      Valproic Acid      Toxicity c bone marrow suspension, elevated ammonia levels

## 2019-10-10 NOTE — PLAN OF CARE
DATE & TIME: 10/9/19 7194-1264  Cognitive Concerns/ Orientation : HECTOR, nonverbal  BEHAVIOR & AGGRESSION TOOL COLOR: yellow; can make a fist and resist cares to perineum  CIWA SCORE: NA   ABNL VS/O2:VSS  MOBILITY: bedrest, T/R Q2h  PAIN MANAGMENT: no nonverbal signs of pain present  DIET: NPO; J tube feeding 7 am-7 pm,  tolerating.  BOWEL/BLADDER: incontinent of large amounts of urine and incontinent of BM  ABNL LAB/BG: phos 1.5, increased phos dose   DRAIN/DEVICES: PIV infusing, J tube; external urinary cath  TELEMETRY RHYTHM: NSR  SKIN: red excoriated buttock; miconazole powder and sarah cleanse applied as ordered with each cares  TESTS/PROCEDURES: NA  D/C DAY/GOALS/PLACE: going home tomorrow @10am via [employer]  OTHER IMPORTANT INFO: continues on IVF

## 2019-10-10 NOTE — PROGRESS NOTES
Patient was seen and examined this morning , remains afebrile, looks nontoxic medic comfortable, receiving breathing treatment this morning, alert and awake and is very excited when discussed about discharge this morning.  Once again I called Savannah but was not able to get hold of her I have left message on her personal cell phone number regarding updating her blood work results.  Chest x-ray has been ordered planning to follow-up on the results at this point I am not expecting any worsening on chest x-rays patient has been clinically improving patient has chronic scarring and infiltrates from his previous multiple recurrent pneumonias on x-ray but at this time clinically he does not qualify for any antibiotics considering his infection work-up is completely normal and he has been afebrile for the last 3 days. I will follow up on Xray results. Patient is planned to be discharged around 10 AM this morning.  Our care coordinator was able to get hold of Savannah yesterday who is aware about plan for discharge. Detailed note to follow later.    Yanely Liriano MD,[facility]

## 2019-10-10 NOTE — PROGRESS NOTES
Reportedly, pt's mother had called last adarsh and asked for a CXR be done before discharge.  This has been done with results pending.  This is surprising that she requested this, as writer had conversed with her after 4:00PM yesterday and went over latest labs and VS and she was in agreement for hospital discharge.  Transportation is for 10:00AM.  We have asked Radiology to read CXR stat.  Both [facility] (P:[phone]) (Fax:[phone]) and [facility] (P:[phone]) (Fax:[phone]) have been contacted and will fax orders to both agencies. The nurse will be at pt's residence when he returns home.  Have updated both agencies on 16 hospital admissions this year and asked if there was any measures they could help with to help prevent readmissions, as most hospitalizations have been aspiration.

## 2019-10-10 NOTE — DISCHARGE INSTRUCTIONS
Your home care through [facility] will be resumed.  Their phone number is [phone].    [facility] as previous [phone]            Patient has clothing and a ring in his room

## 2019-10-13 ENCOUNTER — HOSPITAL ENCOUNTER (EMERGENCY)
Facility: CLINIC | Age: 59
Discharge: HOME | End: 2019-10-14
Attending: EMERGENCY MEDICINE | Admitting: EMERGENCY MEDICINE
Payer: MEDICARE

## 2019-10-13 ENCOUNTER — APPOINTMENT (OUTPATIENT)
Dept: GENERAL RADIOLOGY | Facility: CLINIC | Age: 59
End: 2019-10-13
Attending: EMERGENCY MEDICINE
Payer: MEDICARE

## 2019-10-13 VITALS
SYSTOLIC BLOOD PRESSURE: 128 MMHG | HEART RATE: 104 BPM | TEMPERATURE: 99.2 F | RESPIRATION RATE: 18 BRPM | WEIGHT: 152 LBS | OXYGEN SATURATION: 99 % | DIASTOLIC BLOOD PRESSURE: 71 MMHG | HEIGHT: 72 IN | BODY MASS INDEX: 20.59 KG/M2

## 2019-10-13 DIAGNOSIS — J69.0 ASPIRATION PNEUMONITIS (H): ICD-10-CM

## 2019-10-13 LAB
ALBUMIN SERPL-MCNC: 3 G/DL (ref 3.4–5)
ALBUMIN UR-MCNC: NEGATIVE MG/DL
ALP SERPL-CCNC: 87 U/L (ref 40–150)
ALT SERPL W P-5'-P-CCNC: 28 U/L (ref 0–70)
ANION GAP SERPL CALCULATED.3IONS-SCNC: 5 MMOL/L (ref 3–14)
APPEARANCE UR: CLEAR
AST SERPL W P-5'-P-CCNC: 17 U/L (ref 0–45)
BACTERIA SPEC CULT: NO GROWTH
BACTERIA SPEC CULT: NO GROWTH
BASOPHILS # BLD AUTO: 0.1 10E9/L (ref 0–0.2)
BASOPHILS NFR BLD AUTO: 0.4 %
BILIRUB SERPL-MCNC: 0.2 MG/DL (ref 0.2–1.3)
BILIRUB UR QL STRIP: NEGATIVE
BUN SERPL-MCNC: 17 MG/DL (ref 7–30)
CALCIUM SERPL-MCNC: 8 MG/DL (ref 8.5–10.1)
CHLORIDE SERPL-SCNC: 94 MMOL/L (ref 94–109)
CO2 BLDCOV-SCNC: 27 MMOL/L (ref 21–28)
CO2 SERPL-SCNC: 29 MMOL/L (ref 20–32)
COLOR UR AUTO: YELLOW
CREAT SERPL-MCNC: 0.7 MG/DL (ref 0.66–1.25)
DIFFERENTIAL METHOD BLD: ABNORMAL
EOSINOPHIL # BLD AUTO: 0.3 10E9/L (ref 0–0.7)
EOSINOPHIL NFR BLD AUTO: 2 %
ERYTHROCYTE [DISTWIDTH] IN BLOOD BY AUTOMATED COUNT: 14 % (ref 10–15)
GFR SERPL CREATININE-BSD FRML MDRD: >90 ML/MIN/{1.73_M2}
GLUCOSE SERPL-MCNC: 109 MG/DL (ref 70–99)
GLUCOSE UR STRIP-MCNC: NEGATIVE MG/DL
HCT VFR BLD AUTO: 33.6 % (ref 40–53)
HGB BLD-MCNC: 11.1 G/DL (ref 13.3–17.7)
HGB UR QL STRIP: NEGATIVE
IMM GRANULOCYTES # BLD: 0.1 10E9/L (ref 0–0.4)
IMM GRANULOCYTES NFR BLD: 0.4 %
KETONES UR STRIP-MCNC: NEGATIVE MG/DL
LACTATE BLD-SCNC: 1.3 MMOL/L (ref 0.7–2.1)
LEUKOCYTE ESTERASE UR QL STRIP: NEGATIVE
LYMPHOCYTES # BLD AUTO: 1.9 10E9/L (ref 0.8–5.3)
LYMPHOCYTES NFR BLD AUTO: 13.3 %
Lab: NORMAL
Lab: NORMAL
MCH RBC QN AUTO: 27.4 PG (ref 26.5–33)
MCHC RBC AUTO-ENTMCNC: 33 G/DL (ref 31.5–36.5)
MCV RBC AUTO: 83 FL (ref 78–100)
MONOCYTES # BLD AUTO: 1.4 10E9/L (ref 0–1.3)
MONOCYTES NFR BLD AUTO: 9.5 %
NEUTROPHILS # BLD AUTO: 10.6 10E9/L (ref 1.6–8.3)
NEUTROPHILS NFR BLD AUTO: 74.4 %
NITRATE UR QL: NEGATIVE
NRBC # BLD AUTO: 0 10*3/UL
NRBC BLD AUTO-RTO: 0 /100
PCO2 BLDV: 48 MM HG (ref 40–50)
PH BLDV: 7.36 PH (ref 7.32–7.43)
PH UR STRIP: 8.5 PH (ref 5–7)
PLATELET # BLD AUTO: 210 10E9/L (ref 150–450)
PO2 BLDV: 19 MM HG (ref 25–47)
POTASSIUM SERPL-SCNC: 4.2 MMOL/L (ref 3.4–5.3)
PROCALCITONIN SERPL-MCNC: <0.05 NG/ML
PROT SERPL-MCNC: 7.8 G/DL (ref 6.8–8.8)
RBC # BLD AUTO: 4.05 10E12/L (ref 4.4–5.9)
RBC #/AREA URNS AUTO: <1 /HPF (ref 0–2)
SAO2 % BLDV FROM PO2: 26 %
SODIUM SERPL-SCNC: 128 MMOL/L (ref 133–144)
SOURCE: ABNORMAL
SP GR UR STRIP: 1.02 (ref 1–1.03)
SPECIMEN SOURCE: NORMAL
SPECIMEN SOURCE: NORMAL
SQUAMOUS #/AREA URNS AUTO: <1 /HPF (ref 0–1)
TRANS CELLS #/AREA URNS HPF: <1 /HPF (ref 0–1)
UROBILINOGEN UR STRIP-MCNC: NORMAL MG/DL (ref 0–2)
WBC # BLD AUTO: 14.3 10E9/L (ref 4–11)
WBC #/AREA URNS AUTO: 7 /HPF (ref 0–5)

## 2019-10-13 PROCEDURE — 71046 X-RAY EXAM CHEST 2 VIEWS: CPT

## 2019-10-13 PROCEDURE — 82803 BLOOD GASES ANY COMBINATION: CPT

## 2019-10-13 PROCEDURE — 96361 HYDRATE IV INFUSION ADD-ON: CPT

## 2019-10-13 PROCEDURE — 84145 PROCALCITONIN (PCT): CPT | Performed by: EMERGENCY MEDICINE

## 2019-10-13 PROCEDURE — 83605 ASSAY OF LACTIC ACID: CPT

## 2019-10-13 PROCEDURE — 81001 URINALYSIS AUTO W/SCOPE: CPT | Performed by: EMERGENCY MEDICINE

## 2019-10-13 PROCEDURE — 99284 EMERGENCY DEPT VISIT MOD MDM: CPT | Mod: 25

## 2019-10-13 PROCEDURE — 85025 COMPLETE CBC W/AUTO DIFF WBC: CPT | Performed by: EMERGENCY MEDICINE

## 2019-10-13 PROCEDURE — 25000128 H RX IP 250 OP 636: Performed by: EMERGENCY MEDICINE

## 2019-10-13 PROCEDURE — 80053 COMPREHEN METABOLIC PANEL: CPT | Performed by: EMERGENCY MEDICINE

## 2019-10-13 PROCEDURE — 96360 HYDRATION IV INFUSION INIT: CPT

## 2019-10-13 RX ADMIN — SODIUM CHLORIDE 1000 ML: 9 INJECTION, SOLUTION INTRAVENOUS at 18:51

## 2019-10-13 ASSESSMENT — MIFFLIN-ST. JEOR: SCORE: 1552.47

## 2019-10-13 NOTE — ED PROVIDER NOTES
History   Chief Complaint:  Fever    HPI   Keyon Farias is a 59 year old male, with a history of TBI, aspiration pneumonia, DVT, nonverbal status and seizures, who was discharged 3 days ago for aspiration pneumonia, presenting to the ED for evaluation of fever. EMS reports the patient has had a low grade fever, at 100, for the past couple days and has had increased uncooperative nature, which is a deviation from baseline. En route he had a blood pressure of 118/61, saturations at 95-97 on room air. They state en route he would also cough and sounds like he has phlegm in the back of his throat.     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   DVT of upper extremity   Gastro-oesophageal reflux disease      Panhypopituitarism   Seizures    Septic shock          Spastic hemiplegia affecting dominant side         Thyroid disease            Tracheostomy care         Traumatic brain injury         Unspecified cerebral artery occlusion with cerebral infarction            Ventricular fibrillation         Ventricular tachyarrhythmia  Appendicitis  Panhypopituitarism  Hematemesis  Sepsis  Community acquired pneumonia  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  Hyponatremia  PEG tube malfunction  Cardiac arrest  Acute respiratory failure with hypoxia  Necrotizing pancreatitis  Pneumonia, aspiration  Encephalopathy  Fever  Aspiration pneumonia  Acid reflux     Past Surgical History:    Right hand repair  Head and neck surgery;   vascular surgery;   Laparoscopic appendectomy  Esophagoscopy, gastroscopy, duodenoscopy   Tracheostomy   Laparoscopic assisted insertion tube  gastrotomy  Necrosectomy  IR Gastro Jejunostomy Tube Change      Family History:    Cancer     Social History:  Smoking status: No  Alcohol use: No  Drug use: No  PCP: Carlos Gomez    Review of Systems   Unable to perform ROS: Patient nonverbal     Physical Exam     Patient Vitals for the past 24 hrs:   BP Temp Temp src Pulse Heart Rate Resp SpO2 Height Weight   10/13/19 2230 -- -- -- -- 81 18 -- -- --   10/13/19 2215 128/71 -- -- -- -- -- -- -- --   10/13/19 1945 115/65 -- -- -- -- -- 99 % -- --   10/13/19 1810 107/84 99.2  F (37.3  C) Oral 104 -- 16 94 % 1.829 m (6') 68.9 kg (152 lb)     Physical Exam  General: Alert and cooperative with exam. Patient in mild distress. Nonverbal.  Head:  Scalp is NC/AT  Eyes:  No scleral icterus, PERRL. Chronic pupillary defect on right pupil.  ENT:  The external nose and ears are normal. The oropharynx is normal and without erythema; mucus membranes are moist. Uvula midline, no evidence of deep space infection.  Neck:  Normal range of motion without rigidity.  CV:  Regular rate and rhythm    No pathologic murmur   Resp:  Breath sounds are clear bilaterally    Non-labored, no retractions or accessory muscle use  GI:  Abdomen is soft, no distension, no tenderness. No peritoneal signs. PEG tube in place.  MS:  No lower extremity edema   Skin:  Warm and dry. Mild skin breakdown on buttocks.  Neuro: Right sided paralysis; chonic    Emergency Department Course   Imaging:  Radiographic findings were communicated with the patient who voiced understanding of the findings.    XR Chest, 2 views:  No significant interval change. Shallow inspiration. Hazy  opacity at the right lung base is suspicious for pneumonia. The left  lung is otherwise clear. No pleural effusions are identified    Imaging independently reviewed and agree with radiologist interpretation.    Laboratory:  CBC: WBC 14.3 (H), HGB 11.1 (L), o/w WNL ( )    CMP:  (L),  (H), Ca 8.0 (L), Albumin 3.0 (L), o/w  WNL (Creatinine 0.70)    Procalcitonin: <0.05    ISTAT gases lactate gabe POCT: pH: 7.36, PCO2: 48, PO2: 19 (L), Bicarbonate: 27, O2 Sat: 26, Lactic acid(1947): 1.3    Interventions:  06058: NS 1L IV Bolus     Emergency Department Course:  Past medical records, nursing notes, and vitals reviewed.  1825: I performed an exam of the patient and obtained history, as documented above.  IV inserted and blood drawn.  The patient was sent for a chest x-ray while in the emergency department, findings above.    2015: I rechecked the patient. Explained findings to patient and mother.    2132: I rechecked the patient. Explained findings to patient and mother.    Findings and plan explained to the Patient who consents to admission.     Findings and plan explained to the Patient. Patient discharged home with instructions regarding supportive care, medications, and reasons to return. The importance of close follow-up was reviewed.     Impression & Plan    Medical Decision Making:  Patient is a 59-year-old male with history of TBI and chronic right-sided hemiparesis; nonverbal with history of aspiration pneumonia/pneumonitis.  Patient's medical history and records were reviewed.  Patient presents with a borderline fever earlier today as well as some increased cough.  On evaluation patient appears at his baseline and is not toxic or ill in appearance.  Labs obtained and demonstrate mild hyponatremia (sodium 128), mild leukocytosis (WBC 14.3), chronic anemia (hemoglobin 11.1), and normal lactic acid on VBG.  Chest x-ray shows no significant interval change with hazy opacity in the right lung base which may represent a pneumonia.  Patient without significant cough on exam and remained afebrile throughout ED course.  Provided IV fluids.  Procalcitonin not significantly elevated and at this time patient's presentation seems more consistent with pneumonitis; would not initiate antibiotic therapy at this time given patient's history of  pneumonitis and well appearance.  UA without evidence of infection.  Findings were discussed with the patient's caregiver/mother who is in agreement with plan.  Patient be transferred back to his home via EMS.  He remained stable throughout my care.  Recommended close follow-up with PCP.  Return precautions were discussed with family.    Diagnosis:    ICD-10-CM    1. Aspiration pneumonitis (H) J69.0      Disposition:  Discharged to home.     Alex Heath  10/13/2019    EMERGENCY DEPARTMENT  Scribe Disclosure:  Alex DYKES, am serving as a scribe at 6:25 PM on 10/13/2019 to document services personally performed by Edgar Miles DO based on my observations and the provider's statements to me.         Edgar Miles DO  10/14/19 3025

## 2019-10-13 NOTE — ED TRIAGE NOTES
EMS arrival:    Hx of TBI.  Aspiration risk.  Oral suctioning.  Last couple of days, uncooperative with the suctioning.  Fever 100.  Lives with Mom.  24 hour care.  Doesn't swallow well.   Chokes, vomits.  Coughs.  Patient at his baseline today otherwise.

## 2019-10-13 NOTE — ED AVS SNAPSHOT
Emergency Department  [address] [place] [address]  Phone:  [phone]  Fax:  [phone]                                    Keyon Farias   MRN: [id number]    Department:   Emergency Department   Date of Visit:  10/13/2019           After Visit Summary Signature Page    I have received my discharge instructions, and my questions have been answered. I have discussed any challenges I see with this plan with the nurse or doctor.    ..........................................................................................................................................  Patient/Patient Representative Signature      ..........................................................................................................................................  Patient Representative Print Name and Relationship to Patient    ..................................................               ................................................  Date                                   Time    ..........................................................................................................................................  Reviewed by Signature/Title    ...................................................              ..............................................  Date                                               Time          22EPIC Rev 08/18

## 2019-10-14 NOTE — DISCHARGE INSTRUCTIONS
When to seek medical advice  Call your healthcare provider right away if any of these occur:  You don t get better within the first 48 hours of treatment  Shortness of breath gets worse  Rapid breathing (more than 25 breaths per minute)  Coughing up blood  Chest pain gets worse with breathing  Fever of 100.4 F (38 C) or higher that doesn t get better with fever medicine  Weakness, dizziness, or fainting that gets worse  Thirst or dry mouth that gets worse  Sinus pain, headache, or a stiff neck  Chest pain not caused by coughing

## 2019-10-20 ENCOUNTER — HOSPITAL ENCOUNTER (EMERGENCY)
Facility: CLINIC | Age: 59
Discharge: HOME | End: 2019-10-21
Attending: EMERGENCY MEDICINE | Admitting: EMERGENCY MEDICINE
Payer: MEDICARE

## 2019-10-20 DIAGNOSIS — D72.829 LEUKOCYTOSIS, UNSPECIFIED TYPE: ICD-10-CM

## 2019-10-20 DIAGNOSIS — R11.10 NON-INTRACTABLE VOMITING, PRESENCE OF NAUSEA NOT SPECIFIED, UNSPECIFIED VOMITING TYPE: ICD-10-CM

## 2019-10-20 PROCEDURE — 99284 EMERGENCY DEPT VISIT MOD MDM: CPT | Mod: 25

## 2019-10-20 NOTE — ED AVS SNAPSHOT
Emergency Department  [address] [place] [address]  Phone:  [phone]  Fax:  [phone]                                    Keyon Farias   MRN: [id number]    Department:   Emergency Department   Date of Visit:  10/20/2019           After Visit Summary Signature Page    I have received my discharge instructions, and my questions have been answered. I have discussed any challenges I see with this plan with the nurse or doctor.    ..........................................................................................................................................  Patient/Patient Representative Signature      ..........................................................................................................................................  Patient Representative Print Name and Relationship to Patient    ..................................................               ................................................  Date                                   Time    ..........................................................................................................................................  Reviewed by Signature/Title    ...................................................              ..............................................  Date                                               Time          22EPIC Rev 08/18

## 2019-10-20 NOTE — ED TRIAGE NOTES
Low grade fever per care giver.  Car giver concerned for aspiration pneumonia.   
Patient/Caregiver requests family/friend to interpret.

## 2019-10-21 ENCOUNTER — APPOINTMENT (OUTPATIENT)
Dept: GENERAL RADIOLOGY | Facility: CLINIC | Age: 59
End: 2019-10-21
Attending: EMERGENCY MEDICINE
Payer: MEDICARE

## 2019-10-21 VITALS
OXYGEN SATURATION: 94 % | DIASTOLIC BLOOD PRESSURE: 62 MMHG | RESPIRATION RATE: 16 BRPM | SYSTOLIC BLOOD PRESSURE: 104 MMHG | TEMPERATURE: 99.1 F

## 2019-10-21 LAB
ANION GAP SERPL CALCULATED.3IONS-SCNC: 4 MMOL/L (ref 3–14)
BASOPHILS # BLD AUTO: 0.1 10E9/L (ref 0–0.2)
BASOPHILS NFR BLD AUTO: 0.4 %
BUN SERPL-MCNC: 17 MG/DL (ref 7–30)
CALCIUM SERPL-MCNC: 8.2 MG/DL (ref 8.5–10.1)
CHLORIDE SERPL-SCNC: 98 MMOL/L (ref 94–109)
CO2 BLDCOV-SCNC: 29 MMOL/L (ref 21–28)
CO2 SERPL-SCNC: 29 MMOL/L (ref 20–32)
CREAT SERPL-MCNC: 0.77 MG/DL (ref 0.66–1.25)
DIFFERENTIAL METHOD BLD: ABNORMAL
EOSINOPHIL # BLD AUTO: 0.5 10E9/L (ref 0–0.7)
EOSINOPHIL NFR BLD AUTO: 2.9 %
ERYTHROCYTE [DISTWIDTH] IN BLOOD BY AUTOMATED COUNT: 14.2 % (ref 10–15)
GFR SERPL CREATININE-BSD FRML MDRD: >90 ML/MIN/{1.73_M2}
GLUCOSE SERPL-MCNC: 82 MG/DL (ref 70–99)
HCT VFR BLD AUTO: 36.1 % (ref 40–53)
HGB BLD-MCNC: 11.9 G/DL (ref 13.3–17.7)
IMM GRANULOCYTES # BLD: 0.1 10E9/L (ref 0–0.4)
IMM GRANULOCYTES NFR BLD: 0.3 %
LACTATE BLD-SCNC: 1.8 MMOL/L (ref 0.7–2.1)
LYMPHOCYTES # BLD AUTO: 2.7 10E9/L (ref 0.8–5.3)
LYMPHOCYTES NFR BLD AUTO: 15.9 %
MCH RBC QN AUTO: 27 PG (ref 26.5–33)
MCHC RBC AUTO-ENTMCNC: 33 G/DL (ref 31.5–36.5)
MCV RBC AUTO: 82 FL (ref 78–100)
MONOCYTES # BLD AUTO: 1.3 10E9/L (ref 0–1.3)
MONOCYTES NFR BLD AUTO: 7.5 %
NEUTROPHILS # BLD AUTO: 12.2 10E9/L (ref 1.6–8.3)
NEUTROPHILS NFR BLD AUTO: 73 %
PCO2 BLDV: 46 MM HG (ref 40–50)
PH BLDV: 7.41 PH (ref 7.32–7.43)
PLATELET # BLD AUTO: 200 10E9/L (ref 150–450)
PO2 BLDV: 27 MM HG (ref 25–47)
POTASSIUM SERPL-SCNC: 3.7 MMOL/L (ref 3.4–5.3)
PROCALCITONIN SERPL-MCNC: <0.05 NG/ML
RBC # BLD AUTO: 4.41 10E12/L (ref 4.4–5.9)
SAO2 % BLDV FROM PO2: 49 %
SODIUM SERPL-SCNC: 131 MMOL/L (ref 133–144)
WBC # BLD AUTO: 16.7 10E9/L (ref 4–11)

## 2019-10-21 PROCEDURE — 80048 BASIC METABOLIC PNL TOTAL CA: CPT | Performed by: EMERGENCY MEDICINE

## 2019-10-21 PROCEDURE — 84145 PROCALCITONIN (PCT): CPT | Performed by: EMERGENCY MEDICINE

## 2019-10-21 PROCEDURE — 71046 X-RAY EXAM CHEST 2 VIEWS: CPT

## 2019-10-21 PROCEDURE — 85025 COMPLETE CBC W/AUTO DIFF WBC: CPT | Performed by: EMERGENCY MEDICINE

## 2019-10-21 PROCEDURE — 83605 ASSAY OF LACTIC ACID: CPT

## 2019-10-21 PROCEDURE — 82803 BLOOD GASES ANY COMBINATION: CPT

## 2019-10-21 ASSESSMENT — ENCOUNTER SYMPTOMS
FEVER: 1
VOMITING: 1

## 2019-10-21 NOTE — ED NOTES
Patient ready to go home. Patient's caregiver, buzz Ta. He requests to be called when EMS leaves the hospital so he can be ready for pt to arrive home.

## 2019-10-21 NOTE — ED NOTES
Spoke with patient's legal guardian, his Mom, she states to do whatever needs to be done. She is out of the country at this time.

## 2019-10-21 NOTE — ED TRIAGE NOTES
Pt brought in by EMS. Pt has had fevers on and off today. Per pt's caregiver he vomited several times today, possibly aspirating. Pt's Mom is his legal guardian, she is out of the country. Can contact pt's caregiver, Keyon Dover, at [phone].

## 2019-10-21 NOTE — ED PROVIDER NOTES
History     Chief Complaint:  Fever    HPI   Keyon Farias is a 59 year old male with a history of aspiration pneumonitis who presents to the emergency department via EMS due to a fever. The patient's caregiver reports that today the patient had a fever that was waxing and waning throughout the day today, as high as 100.4, in conjunction with vomiting and a higher amount of groaning than baseline. The caregiver reports that he usually gets like this when he has pneumonia. The patient took tylenol at 1930. Of note, the patient has visited the ER on 10/7/19 and 10/13/19 for treatment of similar symptoms. History is limited as the patient is nonverbal.    Allergies:  Dilantin  Valproic Acid    Medications:    acetylcysteine   albuterol   aspirin  81 MG   Brivaracetam   carBAMazepine  ferrous sulfate   hydrocortisone   duoneb  levothyroxine   melatonin   metoclopramide  Miconazole  pantoprazole  potassium & sodium phosphates   testosterone cypionate     Past Medical History:    SIRS  Acid reflux  Sever sepsis  Aspiration pneumonia  Encephalopathy  Necrotizing pancreatitis  Cardiac arrest  Acute respiratory failure with hypoxia  Hyponatremia  Intractable epilepsy  Breakthrough seizure  Septic shock  Hematemesis  Appendicitis  Panhypopituitarism  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 Last attempt to quit 4/23/1989  Alcohol use: No  Drug use: No  The patient presents to the emergency department via EMS  PCP: Carlos Gomez    Marital Status:  Single [1]    Review of Systems   Unable to perform ROS: Patient nonverbal    Constitutional: Positive for fever.   Gastrointestinal: Positive for vomiting.     Physical Exam     Patient Vitals for the past 24 hrs:   BP Temp Temp src Heart Rate Resp SpO2   10/21/19 0315 104/62 -- -- -- 16 94 %   10/21/19 0200 -- -- -- -- -- 94 %   10/20/19 2347 113/62 99.1  F (37.3  C) Temporal 75 18 95 %     Physical Exam  SKIN:  Warm, dry.  HEMATOLOGIC/IMMUNOLOGIC/LYMPHATIC:  No pallor or edema.  HENT:  Moist oral mucosa.  No stridor.  EYES:  Conjunctivae normal.  CARDIOVASCULAR:  Regular rate and rhythm.  No murmur.  No rub.  RESPIRATORY:  No respiratory distress, breath sounds equal and normal.  GASTROINTESTINAL:  Soft, nontender abdomen.  NEUROLOGIC:  Alert, nonverbal..  PSYCHIATRIC:  Normal mood.    Emergency Department Course   Imaging:  Radiographic findings were communicated with the caregiver who voiced understanding of the findings.  XR Chest 2 views:   IMPRESSION: No convincing evidence of active cardiopulmonary disease as per radiology.    Laboratory:  CBC: WBC: 16.7 (H), HGB: 11.9 (L), PLT: 200  BMP:  (L), Calcium: 8.2 (L), o/w WNL (Creatinine: 0.77)    ISTAT VBG: pH 7.41 / PCO2 46 / PO2 27 / Bicarb 29 (H) / O2 sat 49 / lactic acid 1.8  Procalcitonin: <0.05    Emergency Department Course:  Nursing notes and vitals reviewed. (4374) I performed an exam of the patient as documented above.     IV inserted. Blood drawn. This was sent to the lab for further testing, results above.     The patient was sent for a chest x-ray while in the emergency department, findings above.     I rechecked the patient and discussed the results of his workup thus far.     Findings and plan explained to the caregiver. Patient discharged home with instructions regarding supportive care, medications, and reasons to return. The importance of close follow-up was reviewed.     I personally reviewed the laboratory results with the caregiver and answered all related questions prior to discharge.   Impression & Plan     Medical Decision Making:  This patient presents with an episode of vomiting and concern per his caregiver that he may have aspirated. Certainly possible. Evaluation was reassuring but notable for leukocytosis however a negative procalcitonin and imaging was not consistent with pneumonia. Certainly at risk for pneumonia but at this point I did not think he required admission nor antibiotics. Lactic acid negative as well. Advised recheck.    Diagnosis:    ICD-10-CM    1. Non-intractable vomiting, presence of nausea not specified, unspecified vomiting type R11.10    2. Leukocytosis, unspecified type D72.829        Disposition:  discharged to home      Jeferson Joyner  10/20/2019    EMERGENCY DEPARTMENT  Scribe Disclosure:  Jeferson DYKES, am serving as a scribe at 12:00 AM on 10/21/2019 to document services personally performed by Too Rodarte MD based on my observations and the provider's statements to me.        Too Rodarte MD  10/21/19 6954

## 2019-10-21 NOTE — ED NOTES
Bed: ED11  Expected date:   Expected time:   Means of arrival:   Comments:  jamie 2 59m fever eta 5 min

## 2019-10-25 ENCOUNTER — APPOINTMENT (OUTPATIENT)
Dept: GENERAL RADIOLOGY | Facility: CLINIC | Age: 59
End: 2019-10-25
Attending: EMERGENCY MEDICINE
Payer: MEDICARE

## 2019-10-25 ENCOUNTER — HOSPITAL ENCOUNTER (OUTPATIENT)
Facility: CLINIC | Age: 59
Setting detail: OBSERVATION
Discharge: HOME | End: 2019-10-29
Attending: EMERGENCY MEDICINE | Admitting: HOSPITALIST
Payer: MEDICARE

## 2019-10-25 DIAGNOSIS — R50.9 FEVER, UNSPECIFIED FEVER CAUSE: ICD-10-CM

## 2019-10-25 DIAGNOSIS — S06.9X9S TRAUMATIC BRAIN INJURY WITH LOSS OF CONSCIOUSNESS, SEQUELA (H): Primary | ICD-10-CM

## 2019-10-25 DIAGNOSIS — R47.01 NONVERBAL: ICD-10-CM

## 2019-10-25 DIAGNOSIS — Z87.01 HISTORY OF ASPIRATION PNEUMONIA: ICD-10-CM

## 2019-10-25 LAB
ALBUMIN SERPL-MCNC: 3 G/DL (ref 3.4–5)
ALBUMIN UR-MCNC: 30 MG/DL
ALP SERPL-CCNC: 80 U/L (ref 40–150)
ALT SERPL W P-5'-P-CCNC: 31 U/L (ref 0–70)
ANION GAP SERPL CALCULATED.3IONS-SCNC: 5 MMOL/L (ref 3–14)
APPEARANCE UR: CLEAR
AST SERPL W P-5'-P-CCNC: 22 U/L (ref 0–45)
BASOPHILS # BLD AUTO: 0.1 10E9/L (ref 0–0.2)
BASOPHILS NFR BLD AUTO: 0.3 %
BILIRUB SERPL-MCNC: 0.2 MG/DL (ref 0.2–1.3)
BILIRUB UR QL STRIP: NEGATIVE
BUN SERPL-MCNC: 19 MG/DL (ref 7–30)
CALCIUM SERPL-MCNC: 8.6 MG/DL (ref 8.5–10.1)
CHLORIDE SERPL-SCNC: 99 MMOL/L (ref 94–109)
CO2 BLDCOV-SCNC: 26 MMOL/L (ref 21–28)
CO2 BLDCOV-SCNC: 30 MMOL/L (ref 21–28)
CO2 SERPL-SCNC: 31 MMOL/L (ref 20–32)
COLOR UR AUTO: YELLOW
CREAT SERPL-MCNC: 0.79 MG/DL (ref 0.66–1.25)
DIFFERENTIAL METHOD BLD: ABNORMAL
EOSINOPHIL # BLD AUTO: 0.3 10E9/L (ref 0–0.7)
EOSINOPHIL NFR BLD AUTO: 1.9 %
ERYTHROCYTE [DISTWIDTH] IN BLOOD BY AUTOMATED COUNT: 14.5 % (ref 10–15)
GFR SERPL CREATININE-BSD FRML MDRD: >90 ML/MIN/{1.73_M2}
GLUCOSE SERPL-MCNC: 90 MG/DL (ref 70–99)
GLUCOSE UR STRIP-MCNC: NEGATIVE MG/DL
HCT VFR BLD AUTO: 35 % (ref 40–53)
HGB BLD-MCNC: 11.3 G/DL (ref 13.3–17.7)
HGB UR QL STRIP: NEGATIVE
HYALINE CASTS #/AREA URNS LPF: 2 /LPF (ref 0–2)
IMM GRANULOCYTES # BLD: 0 10E9/L (ref 0–0.4)
IMM GRANULOCYTES NFR BLD: 0.2 %
KETONES UR STRIP-MCNC: NEGATIVE MG/DL
LACTATE BLD-SCNC: 1.5 MMOL/L (ref 0.7–2.1)
LACTATE BLD-SCNC: 2.7 MMOL/L (ref 0.7–2.1)
LEUKOCYTE ESTERASE UR QL STRIP: NEGATIVE
LIPASE SERPL-CCNC: 262 U/L (ref 73–393)
LYMPHOCYTES # BLD AUTO: 2.2 10E9/L (ref 0.8–5.3)
LYMPHOCYTES NFR BLD AUTO: 13.4 %
MCH RBC QN AUTO: 27.4 PG (ref 26.5–33)
MCHC RBC AUTO-ENTMCNC: 32.3 G/DL (ref 31.5–36.5)
MCV RBC AUTO: 85 FL (ref 78–100)
MONOCYTES # BLD AUTO: 1.1 10E9/L (ref 0–1.3)
MONOCYTES NFR BLD AUTO: 6.9 %
MUCOUS THREADS #/AREA URNS LPF: PRESENT /LPF
NEUTROPHILS # BLD AUTO: 12.5 10E9/L (ref 1.6–8.3)
NEUTROPHILS NFR BLD AUTO: 77.3 %
NITRATE UR QL: NEGATIVE
NRBC # BLD AUTO: 0 10*3/UL
NRBC BLD AUTO-RTO: 0 /100
PCO2 BLDV: 44 MM HG (ref 40–50)
PCO2 BLDV: 55 MM HG (ref 40–50)
PH BLDV: 7.35 PH (ref 7.32–7.43)
PH BLDV: 7.37 PH (ref 7.32–7.43)
PH UR STRIP: 7.5 PH (ref 5–7)
PLATELET # BLD AUTO: 222 10E9/L (ref 150–450)
PO2 BLDV: 18 MM HG (ref 25–47)
PO2 BLDV: 35 MM HG (ref 25–47)
POTASSIUM SERPL-SCNC: 3.9 MMOL/L (ref 3.4–5.3)
PROCALCITONIN SERPL-MCNC: 0.09 NG/ML
PROT SERPL-MCNC: 7.9 G/DL (ref 6.8–8.8)
RBC # BLD AUTO: 4.12 10E12/L (ref 4.4–5.9)
RBC #/AREA URNS AUTO: 0 /HPF (ref 0–2)
SAO2 % BLDV FROM PO2: 23 %
SAO2 % BLDV FROM PO2: 65 %
SODIUM SERPL-SCNC: 135 MMOL/L (ref 133–144)
SOURCE: ABNORMAL
SP GR UR STRIP: 1.03 (ref 1–1.03)
UROBILINOGEN UR STRIP-MCNC: 2 MG/DL (ref 0–2)
WBC # BLD AUTO: 16.2 10E9/L (ref 4–11)
WBC #/AREA URNS AUTO: 0 /HPF (ref 0–5)

## 2019-10-25 PROCEDURE — 71046 X-RAY EXAM CHEST 2 VIEWS: CPT

## 2019-10-25 PROCEDURE — 99219 ZZC INITIAL OBSERVATION CARE,LEVL II: CPT | Performed by: HOSPITALIST

## 2019-10-25 PROCEDURE — G0378 HOSPITAL OBSERVATION PER HR: HCPCS

## 2019-10-25 PROCEDURE — 83605 ASSAY OF LACTIC ACID: CPT

## 2019-10-25 PROCEDURE — 83690 ASSAY OF LIPASE: CPT | Performed by: EMERGENCY MEDICINE

## 2019-10-25 PROCEDURE — 96365 THER/PROPH/DIAG IV INF INIT: CPT

## 2019-10-25 PROCEDURE — 80053 COMPREHEN METABOLIC PANEL: CPT | Performed by: EMERGENCY MEDICINE

## 2019-10-25 PROCEDURE — 85025 COMPLETE CBC W/AUTO DIFF WBC: CPT | Performed by: EMERGENCY MEDICINE

## 2019-10-25 PROCEDURE — 25800030 ZZH RX IP 258 OP 636: Performed by: EMERGENCY MEDICINE

## 2019-10-25 PROCEDURE — 99285 EMERGENCY DEPT VISIT HI MDM: CPT | Mod: 25

## 2019-10-25 PROCEDURE — 25000132 ZZH RX MED GY IP 250 OP 250 PS 637: Mod: GY | Performed by: EMERGENCY MEDICINE

## 2019-10-25 PROCEDURE — 87040 BLOOD CULTURE FOR BACTERIA: CPT | Performed by: EMERGENCY MEDICINE

## 2019-10-25 PROCEDURE — 81001 URINALYSIS AUTO W/SCOPE: CPT | Performed by: EMERGENCY MEDICINE

## 2019-10-25 PROCEDURE — 84145 PROCALCITONIN (PCT): CPT | Performed by: EMERGENCY MEDICINE

## 2019-10-25 PROCEDURE — 82803 BLOOD GASES ANY COMBINATION: CPT

## 2019-10-25 PROCEDURE — 25000128 H RX IP 250 OP 636: Performed by: EMERGENCY MEDICINE

## 2019-10-25 PROCEDURE — 96361 HYDRATE IV INFUSION ADD-ON: CPT

## 2019-10-25 RX ORDER — AMOXICILLIN 250 MG
2 CAPSULE ORAL 2 TIMES DAILY PRN
Status: DISCONTINUED | OUTPATIENT
Start: 2019-10-25 | End: 2019-10-26

## 2019-10-25 RX ORDER — HYDROCORTISONE 10 MG/1
10 TABLET ORAL
Status: DISCONTINUED | OUTPATIENT
Start: 2019-10-26 | End: 2019-10-29 | Stop reason: HOSPADM

## 2019-10-25 RX ORDER — AMOXICILLIN 250 MG
1 CAPSULE ORAL 2 TIMES DAILY PRN
Status: DISCONTINUED | OUTPATIENT
Start: 2019-10-25 | End: 2019-10-26

## 2019-10-25 RX ORDER — SODIUM CHLORIDE 9 MG/ML
1000 INJECTION, SOLUTION INTRAVENOUS CONTINUOUS
Status: DISCONTINUED | OUTPATIENT
Start: 2019-10-25 | End: 2019-10-26

## 2019-10-25 RX ORDER — HYDROCORTISONE 20 MG/1
20 TABLET ORAL EVERY MORNING
Status: DISCONTINUED | OUTPATIENT
Start: 2019-10-26 | End: 2019-10-29 | Stop reason: HOSPADM

## 2019-10-25 RX ORDER — CARBAMAZEPINE 100 MG/5ML
150 SUSPENSION ORAL EVERY 6 HOURS
Status: DISCONTINUED | OUTPATIENT
Start: 2019-10-26 | End: 2019-10-29 | Stop reason: HOSPADM

## 2019-10-25 RX ORDER — NALOXONE HYDROCHLORIDE 0.4 MG/ML
.1-.4 INJECTION, SOLUTION INTRAMUSCULAR; INTRAVENOUS; SUBCUTANEOUS
Status: DISCONTINUED | OUTPATIENT
Start: 2019-10-25 | End: 2019-10-29 | Stop reason: HOSPADM

## 2019-10-25 RX ORDER — METOCLOPRAMIDE HYDROCHLORIDE 5 MG/5ML
5 SOLUTION ORAL 2 TIMES DAILY
Status: DISCONTINUED | OUTPATIENT
Start: 2019-10-25 | End: 2019-10-29 | Stop reason: HOSPADM

## 2019-10-25 RX ORDER — SCOPOLAMINE HYDROBROMIDE
0.4 POWDER (GRAM) MISCELLANEOUS 3 TIMES DAILY
Status: DISCONTINUED | OUTPATIENT
Start: 2019-10-25 | End: 2019-10-27

## 2019-10-25 RX ORDER — ACETAMINOPHEN 325 MG/1
650 TABLET ORAL EVERY 4 HOURS PRN
Status: DISCONTINUED | OUTPATIENT
Start: 2019-10-25 | End: 2019-10-26

## 2019-10-25 RX ORDER — POLYETHYLENE GLYCOL 3350 17 G/17G
17 POWDER, FOR SOLUTION ORAL DAILY PRN
Status: DISCONTINUED | OUTPATIENT
Start: 2019-10-25 | End: 2019-10-26

## 2019-10-25 RX ORDER — ACETAMINOPHEN 650 MG/1
650 SUPPOSITORY RECTAL EVERY 4 HOURS PRN
Status: DISCONTINUED | OUTPATIENT
Start: 2019-10-25 | End: 2019-10-29 | Stop reason: HOSPADM

## 2019-10-25 RX ORDER — IPRATROPIUM BROMIDE AND ALBUTEROL SULFATE 2.5; .5 MG/3ML; MG/3ML
1 SOLUTION RESPIRATORY (INHALATION) EVERY 4 HOURS PRN
Status: DISCONTINUED | OUTPATIENT
Start: 2019-10-25 | End: 2019-10-29 | Stop reason: HOSPADM

## 2019-10-25 RX ORDER — PIPERACILLIN SODIUM, TAZOBACTAM SODIUM 4; .5 G/20ML; G/20ML
4.5 INJECTION, POWDER, LYOPHILIZED, FOR SOLUTION INTRAVENOUS ONCE
Status: COMPLETED | OUTPATIENT
Start: 2019-10-25 | End: 2019-10-25

## 2019-10-25 RX ORDER — ONDANSETRON 4 MG/1
4 TABLET, ORALLY DISINTEGRATING ORAL EVERY 6 HOURS PRN
Status: DISCONTINUED | OUTPATIENT
Start: 2019-10-25 | End: 2019-10-29 | Stop reason: HOSPADM

## 2019-10-25 RX ORDER — ACETYLCYSTEINE 200 MG/ML
2 SOLUTION ORAL; RESPIRATORY (INHALATION) EVERY 6 HOURS
Status: DISCONTINUED | OUTPATIENT
Start: 2019-10-26 | End: 2019-10-29 | Stop reason: HOSPADM

## 2019-10-25 RX ORDER — LANOLIN ALCOHOL/MO/W.PET/CERES
6 CREAM (GRAM) TOPICAL AT BEDTIME
Status: DISCONTINUED | OUTPATIENT
Start: 2019-10-25 | End: 2019-10-29 | Stop reason: HOSPADM

## 2019-10-25 RX ORDER — ONDANSETRON 2 MG/ML
4 INJECTION INTRAMUSCULAR; INTRAVENOUS EVERY 6 HOURS PRN
Status: DISCONTINUED | OUTPATIENT
Start: 2019-10-25 | End: 2019-10-29 | Stop reason: HOSPADM

## 2019-10-25 RX ORDER — ALBUTEROL SULFATE 0.83 MG/ML
2.5 SOLUTION RESPIRATORY (INHALATION)
Status: DISCONTINUED | OUTPATIENT
Start: 2019-10-25 | End: 2019-10-29 | Stop reason: HOSPADM

## 2019-10-25 RX ADMIN — SODIUM CHLORIDE 1000 ML: 9 INJECTION, SOLUTION INTRAVENOUS at 20:27

## 2019-10-25 RX ADMIN — SODIUM CHLORIDE 1000 ML: 9 INJECTION, SOLUTION INTRAVENOUS at 21:11

## 2019-10-25 RX ADMIN — ACETAMINOPHEN 1000 MG: 160 SUSPENSION ORAL at 19:49

## 2019-10-25 RX ADMIN — PIPERACILLIN AND TAZOBACTAM 4.5 G: 4; .5 INJECTION, POWDER, FOR SOLUTION INTRAVENOUS at 20:28

## 2019-10-26 LAB
ANION GAP SERPL CALCULATED.3IONS-SCNC: 3 MMOL/L (ref 3–14)
BUN SERPL-MCNC: 14 MG/DL (ref 7–30)
CALCIUM SERPL-MCNC: 7.1 MG/DL (ref 8.5–10.1)
CHLORIDE SERPL-SCNC: 109 MMOL/L (ref 94–109)
CO2 SERPL-SCNC: 27 MMOL/L (ref 20–32)
CREAT SERPL-MCNC: 0.85 MG/DL (ref 0.66–1.25)
CRP SERPL-MCNC: 87.3 MG/L (ref 0–8)
ERYTHROCYTE [DISTWIDTH] IN BLOOD BY AUTOMATED COUNT: 14.5 % (ref 10–15)
GFR SERPL CREATININE-BSD FRML MDRD: >90 ML/MIN/{1.73_M2}
GLUCOSE BLDC GLUCOMTR-MCNC: 126 MG/DL (ref 70–99)
GLUCOSE SERPL-MCNC: 85 MG/DL (ref 70–99)
HCT VFR BLD AUTO: 30.3 % (ref 40–53)
HGB BLD-MCNC: 9.8 G/DL (ref 13.3–17.7)
MCH RBC QN AUTO: 27.2 PG (ref 26.5–33)
MCHC RBC AUTO-ENTMCNC: 32.3 G/DL (ref 31.5–36.5)
MCV RBC AUTO: 84 FL (ref 78–100)
PLATELET # BLD AUTO: 181 10E9/L (ref 150–450)
POTASSIUM SERPL-SCNC: 3.8 MMOL/L (ref 3.4–5.3)
PROCALCITONIN SERPL-MCNC: 0.11 NG/ML
RBC # BLD AUTO: 3.6 10E12/L (ref 4.4–5.9)
SODIUM SERPL-SCNC: 139 MMOL/L (ref 133–144)
WBC # BLD AUTO: 11.9 10E9/L (ref 4–11)

## 2019-10-26 PROCEDURE — 27210429 ZZH NUTRITION PRODUCT INTERMEDIATE LITER

## 2019-10-26 PROCEDURE — 94640 AIRWAY INHALATION TREATMENT: CPT | Mod: 76

## 2019-10-26 PROCEDURE — 86140 C-REACTIVE PROTEIN: CPT | Performed by: HOSPITALIST

## 2019-10-26 PROCEDURE — G0378 HOSPITAL OBSERVATION PER HR: HCPCS

## 2019-10-26 PROCEDURE — 80048 BASIC METABOLIC PNL TOTAL CA: CPT | Performed by: HOSPITALIST

## 2019-10-26 PROCEDURE — 25000132 ZZH RX MED GY IP 250 OP 250 PS 637: Mod: GY | Performed by: HOSPITALIST

## 2019-10-26 PROCEDURE — 40000275 ZZH STATISTIC RCP TIME EA 10 MIN

## 2019-10-26 PROCEDURE — 99207 ZZC CDG-CODE CATEGORY CHANGED: CPT | Performed by: HOSPITALIST

## 2019-10-26 PROCEDURE — 25800030 ZZH RX IP 258 OP 636: Performed by: HOSPITALIST

## 2019-10-26 PROCEDURE — 99225 ZZC SUBSEQUENT OBSERVATION CARE,LEVEL II: CPT | Performed by: HOSPITALIST

## 2019-10-26 PROCEDURE — 00000146 ZZHCL STATISTIC GLUCOSE BY METER IP

## 2019-10-26 PROCEDURE — 94640 AIRWAY INHALATION TREATMENT: CPT

## 2019-10-26 PROCEDURE — 85027 COMPLETE CBC AUTOMATED: CPT | Performed by: HOSPITALIST

## 2019-10-26 PROCEDURE — 36415 COLL VENOUS BLD VENIPUNCTURE: CPT | Performed by: HOSPITALIST

## 2019-10-26 PROCEDURE — 84145 PROCALCITONIN (PCT): CPT | Performed by: HOSPITALIST

## 2019-10-26 PROCEDURE — 25000132 ZZH RX MED GY IP 250 OP 250 PS 637: Mod: GY

## 2019-10-26 PROCEDURE — 40000275 ZZH STATISTIC RCP TIME EA 10 MIN: Mod: 76

## 2019-10-26 PROCEDURE — 25000125 ZZHC RX 250: Performed by: HOSPITALIST

## 2019-10-26 RX ORDER — AMINO AC/PROTEIN HYDR/WHEY PRO 10G-100/30
1 LIQUID (ML) ORAL 2 TIMES DAILY
Status: DISCONTINUED | OUTPATIENT
Start: 2019-10-26 | End: 2019-10-29 | Stop reason: HOSPADM

## 2019-10-26 RX ORDER — LEVOTHYROXINE SODIUM 150 UG/1
150 TABLET ORAL EVERY MORNING
Status: DISCONTINUED | OUTPATIENT
Start: 2019-10-26 | End: 2019-10-26

## 2019-10-26 RX ORDER — AMOXICILLIN 250 MG
1 CAPSULE ORAL 2 TIMES DAILY PRN
Status: DISCONTINUED | OUTPATIENT
Start: 2019-10-26 | End: 2019-10-29 | Stop reason: HOSPADM

## 2019-10-26 RX ORDER — POLYETHYLENE GLYCOL 3350 17 G/17G
17 POWDER, FOR SOLUTION ORAL DAILY PRN
Status: DISCONTINUED | OUTPATIENT
Start: 2019-10-26 | End: 2019-10-29 | Stop reason: HOSPADM

## 2019-10-26 RX ORDER — AMOXICILLIN 250 MG
2 CAPSULE ORAL 2 TIMES DAILY PRN
Status: DISCONTINUED | OUTPATIENT
Start: 2019-10-26 | End: 2019-10-29 | Stop reason: HOSPADM

## 2019-10-26 RX ORDER — LEVOTHYROXINE SODIUM 150 UG/1
150 TABLET ORAL EVERY MORNING
Status: DISCONTINUED | OUTPATIENT
Start: 2019-10-27 | End: 2019-10-29 | Stop reason: HOSPADM

## 2019-10-26 RX ORDER — SODIUM CHLORIDE 9 MG/ML
1000 INJECTION, SOLUTION INTRAVENOUS CONTINUOUS
Status: DISCONTINUED | OUTPATIENT
Start: 2019-10-26 | End: 2019-10-26

## 2019-10-26 RX ORDER — GUAR GUM
1 PACKET (EA) ORAL DAILY
Status: DISCONTINUED | OUTPATIENT
Start: 2019-10-26 | End: 2019-10-29 | Stop reason: HOSPADM

## 2019-10-26 RX ORDER — ACETAMINOPHEN 325 MG/1
650 TABLET ORAL EVERY 4 HOURS PRN
Status: DISCONTINUED | OUTPATIENT
Start: 2019-10-26 | End: 2019-10-29 | Stop reason: HOSPADM

## 2019-10-26 RX ADMIN — ACETYLCYSTEINE 2 ML: 200 SOLUTION ORAL; RESPIRATORY (INHALATION) at 23:25

## 2019-10-26 RX ADMIN — SODIUM CHLORIDE 1000 ML: 9 INJECTION, SOLUTION INTRAVENOUS at 09:43

## 2019-10-26 RX ADMIN — HYDROCORTISONE 20 MG: 20 TABLET ORAL at 08:07

## 2019-10-26 RX ADMIN — AMOXICILLIN AND CLAVULANATE POTASSIUM 1 TABLET: 875; 125 TABLET, FILM COATED ORAL at 08:06

## 2019-10-26 RX ADMIN — CARBAMAZEPINE 150 MG: 100 SUSPENSION ORAL at 00:22

## 2019-10-26 RX ADMIN — MELATONIN 6 MG: 3 TAB ORAL at 21:56

## 2019-10-26 RX ADMIN — ACETYLCYSTEINE 2 ML: 200 SOLUTION ORAL; RESPIRATORY (INHALATION) at 15:58

## 2019-10-26 RX ADMIN — ALBUTEROL SULFATE 2.5 MG: 2.5 SOLUTION RESPIRATORY (INHALATION) at 23:26

## 2019-10-26 RX ADMIN — SODIUM CHLORIDE 1000 ML: 9 INJECTION, SOLUTION INTRAVENOUS at 02:35

## 2019-10-26 RX ADMIN — HYDROCORTISONE 10 MG: 10 TABLET ORAL at 16:31

## 2019-10-26 RX ADMIN — Medication 1 PACKET: at 20:33

## 2019-10-26 RX ADMIN — ALBUTEROL SULFATE 2.5 MG: 2.5 SOLUTION RESPIRATORY (INHALATION) at 11:42

## 2019-10-26 RX ADMIN — Medication 1 PACKET: at 16:30

## 2019-10-26 RX ADMIN — POTASSIUM & SODIUM PHOSPHATES POWDER PACK 280-160-250 MG 1 PACKET: 280-160-250 PACK at 16:30

## 2019-10-26 RX ADMIN — POTASSIUM & SODIUM PHOSPHATES POWDER PACK 280-160-250 MG 1 PACKET: 280-160-250 PACK at 08:07

## 2019-10-26 RX ADMIN — METOCLOPRAMIDE 5 MG: 5 SOLUTION ORAL at 20:32

## 2019-10-26 RX ADMIN — METOCLOPRAMIDE 5 MG: 5 SOLUTION ORAL at 08:08

## 2019-10-26 RX ADMIN — PANTOPRAZOLE SODIUM 40 MG: 40 TABLET, DELAYED RELEASE ORAL at 08:08

## 2019-10-26 RX ADMIN — CARBAMAZEPINE 150 MG: 100 SUSPENSION ORAL at 13:43

## 2019-10-26 RX ADMIN — BRIVARACETAM 100 MG: 10 SOLUTION ORAL at 08:48

## 2019-10-26 RX ADMIN — ALBUTEROL SULFATE 2.5 MG: 2.5 SOLUTION RESPIRATORY (INHALATION) at 16:01

## 2019-10-26 RX ADMIN — Medication 1 PACKET: at 16:23

## 2019-10-26 RX ADMIN — ACETYLCYSTEINE 2 ML: 200 SOLUTION ORAL; RESPIRATORY (INHALATION) at 00:23

## 2019-10-26 RX ADMIN — CARBAMAZEPINE 150 MG: 100 SUSPENSION ORAL at 06:01

## 2019-10-26 RX ADMIN — AMOXICILLIN AND CLAVULANATE POTASSIUM 1 TABLET: 875; 125 TABLET, FILM COATED ORAL at 18:25

## 2019-10-26 RX ADMIN — Medication 0.4 MG: at 20:34

## 2019-10-26 RX ADMIN — ALBUTEROL SULFATE 2.5 MG: 2.5 SOLUTION RESPIRATORY (INHALATION) at 07:27

## 2019-10-26 RX ADMIN — BRIVARACETAM 100 MG: 10 SOLUTION ORAL at 00:22

## 2019-10-26 RX ADMIN — ALBUTEROL SULFATE 2.5 MG: 2.5 SOLUTION RESPIRATORY (INHALATION) at 19:26

## 2019-10-26 RX ADMIN — MICONAZOLE NITRATE: 20 POWDER TOPICAL at 02:31

## 2019-10-26 RX ADMIN — METOCLOPRAMIDE 5 MG: 5 SOLUTION ORAL at 00:21

## 2019-10-26 RX ADMIN — ACETYLCYSTEINE 2 ML: 200 SOLUTION ORAL; RESPIRATORY (INHALATION) at 19:26

## 2019-10-26 RX ADMIN — LEVOTHYROXINE SODIUM 150 MCG: 150 TABLET ORAL at 07:05

## 2019-10-26 RX ADMIN — CARBAMAZEPINE 150 MG: 100 SUSPENSION ORAL at 18:24

## 2019-10-26 NOTE — H&P
[facility]    History and Physical  Hospitalist       Date of Admission:  10/25/2019    Assessment & Plan   Keyon Farias is a 59 year old male who presents with fevers    Fevers secondary to likely aspiration pneumonitis, possible aspiration pneumonia  Lactic acidosis  Vomiting  Recently seen in ED on 10/21 for vomiting, was discharged from the ED at that time as his vomiting resolved. He had another few episodes of vomiting today, had fever of 100 that jumped to 102. Received zosyn in the ED. Lactic acid was 2.7, procalcitonin was 0.09 (low but detectable), WBC 16.2. Not requiring any oxygen. CXR shows elevated right hemidiaphragm but no consolidation seen. UA without obvious infection. Typically has short hospitalizations  - Admit observation  - As he did have procal of 0.09 and elevated lactic acid, will continue him on Augmentin, which he has tolerated well numerous times in the past  - Oxygen PRN  - NPO, tube feeds/meds via GJ  - Resume scopolamine powder  - Elevate head of bed  - He does not tolerate suctioning well, but he does have dry cough currently  - Blood cultures x2 pending  - Sputum cx ordered, but do not suspect that he can produce one  - Continue PTA albuterol and Mucomyst nebulizer treatments  - zofran available if vomits  - Lactic acidosis resolved after 1L IVF in ED (could be secondary to recent vomiting)  - Trend WBC, fevers  - IVF @75ml/hr until tube feeds resumed     Hx Lip-smacking behavior prior to fevers  -Not noted so far this admit.     Epilepsy  Follows with Dr. Valdes through [facility].  Secondary to TBI. 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  -Continue PTA hydrocortisone 10 mg q. evening, 20 mg every morning  -Continue PTA levothyroxine 150 mg daily  -Continue PTA testosterone on discharge     Perianal rash  Consistent with Candida in the setting of urinary incontinence.   Painful/uncomfortable for patient.  - antifungal available     Chronic GJ  -Nutrition consulted for reinitiation of tube feeding    DVT Prophylaxis: Pneumatic Compression Devices  Code Status: Full Code  Expected discharge: 24-48 hours, recommended to prior living arrangement once fevers resolve, no further vomiting    Keeley Claros DO    Primary Care Physician   Carlos Gomez    Chief Complaint   Fever    History is obtained from the ED physician    History of Present Illness   Keyon Farias is a 59 year old male who presents with fevers. Was seen in ED 4 days prior with vomiting and was discharged home. Had 2 episodes vomiting on day of admission, also with fever up to 100/102. He is unable to add to further HPI and mother unavailable via phone.    Past Medical History    I have reviewed this patient's medical history and updated it with pertinent information if needed.   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  Past Surgical History:   Procedure Laterality Date     ENDOSCOPIC ULTRASOUND UPPER GASTROINTESTINAL  TRACT (GI) N/A 1/30/2017    Procedure: ENDOSCOPIC ULTRASOUND, ESOPHAGOSCOPY / UPPER GASTROINTESTINAL TRACT (GI);  Surgeon: Jus Montana MD;  Location:  OR     ENDOSCOPIC ULTRASOUND, ESOPHAGOSCOPY, GASTROSCOPY, DUODENOSCOPY (EGD), NECROSECTOMY N/A 2/7/2017    Procedure: ENDOSCOPIC ULTRASOUND, ESOPHAGOSCOPY, GASTROSCOPY, DUODENOSCOPY (EGD), NECROSECTOMY;  Surgeon: Jack Marcus MD;  Location:  OR     ESOPHAGOSCOPY, GASTROSCOPY, DUODENOSCOPY (EGD), COMBINED  3/13/2014    Procedure: COMBINED ESOPHAGOSCOPY, GASTROSCOPY, DUODENOSCOPY (EGD), BIOPSY SINGLE OR MULTIPLE;  gastroscopy;  Surgeon: Digna Rhodes MD;  Location: [facility]     ESOPHAGOSCOPY, GASTROSCOPY, DUODENOSCOPY (EGD), COMBINED N/A 12/6/2016    Procedure: COMBINED ESOPHAGOSCOPY, GASTROSCOPY, DUODENOSCOPY (EGD);  Surgeon: Digna Rhodes MD;  Location: [facility]     ESOPHAGOSCOPY, GASTROSCOPY, DUODENOSCOPY (EGD), COMBINED N/A 2/7/2017    Procedure: COMBINED ENDOSCOPIC ULTRASOUND, ESOPHAGOSCOPY, GASTROSCOPY, DUODENOSCOPY (EGD), FINE NEEDLE ASPIRATE/BIOPSY;  Surgeon: Too Thakur MD;  Location:  OR     HEAD & NECK SURGERY      reconstructive facial surgery following accident in 1989     IR FOLLOW UP VISIT INPATIENT  2/20/2019     IR GASTRO JEJUNOSTOMY TUBE CHANGE  12/20/2018     IR GASTRO JEJUNOSTOMY TUBE CHANGE  2/4/2019     IR GASTRO JEJUNOSTOMY TUBE CHANGE  3/8/2019     IR GASTRO JEJUNOSTOMY TUBE CHANGE  8/7/2019     IR PICC EXCHANGE LEFT  8/15/2019     LAPAROSCOPIC APPENDECTOMY  7/30/2013    Procedure: LAPAROSCOPIC APPENDECTOMY;  LAPAROSCOPIC APPENDECTOMY;  Surgeon: Manish Peirce MD;  Location:  OR     LAPAROSCOPIC ASSISTED INSERTION TUBE GASTROTOMY N/A 9/7/2016    Procedure: LAPAROSCOPIC ASSISTED INSERTION TUBE GASTROSTOMY;  Surgeon: Manish Pierce MD;  Location:  OR     ORTHOPEDIC SURGERY      right hand repair     TRACHEOSTOMY N/A 9/3/2016    Procedure: TRACHEOSTOMY;  Surgeon: João Ortiz  MD Srinivas;  Location: [facility] OR     TRACHEOSTOMY N/A 2016    Procedure: TRACHEOSTOMY;  Surgeon: João Ortiz MD;  Location: [facility] OR     VASCULAR SURGERY         Prior to Admission Medications   Prior to Admission Medications   Prescriptions Last Dose Informant Patient Reported? Taking?   Bioflavonoid Products (VITAMIN C PLUS) 1000 MG TABS  Mother Yes No   Si tablet by Oral or FT or NG tube route   Brivaracetam (BRIVIACT) 10 MG/ML solution 10/25/2019 at 0900 Mother Yes Yes   Si mg by Oral or Feeding Tube route 2 times daily 0900, 2100   Guar Gum (FIBER MODULAR, NUTRISOURCE FIBER,) packet 10/25/2019 at 0900 Mother No Yes   Si packet by Per G Tube route daily   Scopolamine HBr POWD 10/25/2019 at Unknown time  Yes Yes   Si.4 mg by Oral or Feeding Tube route 3 times daily   Skin Protectants, Misc. (BALMEX SKIN PROTECTANT) OINT as needed Mother Yes Yes   Sig: Externally apply topically 2 times daily as needed (irritation) Applay to reddened memo areas twice daily as needed   acetylcysteine (MUCOMYST) 20 % neb solution 10/25/2019 at 1500 Mother Yes Yes   Sig: Take 2 mLs by nebulization every 6 hours With albuterol at 0700, 1100, 1500, and 1900   albuterol (PROVENTIL) (5 MG/ML) 0.5% neb solution 10/25/2019 at 1500 Mother Yes Yes   Sig: Take 2.5 mg by nebulization every 4 hours (while awake) 0700 1100 1500 1900 with mucomyst    aspirin (ASA) 81 MG chewable tablet 10/25/2019 at Unknown time Mother Yes Yes   Si mg by Oral or Feeding Tube route daily At 0900   bacitracin ointment as needed Mother Yes Yes   Sig: Apply topically daily as needed for wound care To PEG site.    calcium carbonate 1250 (500 CA) MG/5ML SUSP suspension 10/25/2019 at 1500 Mother No Yes   Si mLs (1,250 mg) by Per J Tube route 3 times daily (with meals)   carBAMazepine (TEGRETOL) 100 MG/5ML suspension 10/25/2019 at 1800? Mother Yes Yes   Si mg by Oral or Feeding Tube route every 6 hours At 06:00, 12:00, 18:00  and 24:00 for seizures    ferrous sulfate 220 (44 Fe) MG/5ML ELIX 10/25/2019 at 0900 Mother Yes Yes   Si mg by Per Feeding Tube route daily   hydrocortisone (CORTEF) 5 MG tablet 10/25/2019 at 1500 Mother Yes Yes   Sig: 10 mg by Oral or FT or NG tube route daily (with dinner) At 1500   hydrocortisone (CORTEF) 5 MG tablet 10/25/2019 at 0900 Mother Yes Yes   Si mg by Oral or FT or NG tube route every morning    hydrocortisone 1 % CREA cream as needed Mother Yes Yes   Sig: Place rectally 2 times daily as needed for other Apply to reddened memo areas as needed   ipratropium - albuterol 0.5 mg/2.5 mg/3 mL (DUONEB) 0.5-2.5 (3) MG/3ML neb solution as needed Mother Yes Yes   Sig: Take 1 vial by nebulization every 4 hours as needed (bronchospasms or cough)   levothyroxine (SYNTHROID/LEVOTHROID) 150 MCG tablet 10/25/2019 at Unknown time Mother Yes Yes   Sig: Take 150 mcg by mouth every morning   melatonin (MELATONIN) 1 MG/ML LIQD liquid 10/24/2019 at Unknown time Mother Yes Yes   Si mg by Per NG tube route At Bedtime    metoclopramide (REGLAN) 5 MG/5ML solution 10/25/2019 at am Mother Yes Yes   Si mg by Per Feeding Tube route 2 times daily   miconazole (MICATIN) 2 % AERP powder as needed Mother Yes Yes   Sig: Apply topically 2 times daily as needed    mupirocin (BACTROBAN) 2 % external ointment as needed Mother Yes Yes   Sig: Apply topically 2 times daily as needed    order for DME  Mother No No   Sig: Equipment being ordered: Nebulizer   pantoprazole (PROTONIX) 2 mg/mL SUSP suspension 10/25/2019 at 0900 Mother No Yes   Si mLs (40 mg) by Per J Tube route daily   potassium & sodium phosphates (NEUTRA-PHOS) 280-160-250 MG Packet 10/25/2019 at 0900  Yes Yes   Sig: Take 1 packet by mouth 2 times daily    testosterone cypionate (DEPOTESTOTERONE CYPIONATE) 200 MG/ML injection unknown last dose Mother Yes Yes   Sig: Inject 76 mg into the muscle See Admin Instructions Every 2 weeks on    76 mg or 0.38 mL    vitamin D3 (CHOLECALCIFEROL) 2000 units (50 mcg) tablet 10/25/2019 at 0900 Mother Yes Yes   Sig: Take 2,000 Units by mouth daily Crush and feed via j-tube @@ 0900      Facility-Administered Medications: None     Allergies   Allergies   Allergen Reactions     Dilantin [Phenytoin Sodium]      Valproic Acid      Toxicity c bone marrow suspension, elevated ammonia levels        Social History   I have reviewed this patient's social history and updated it with pertinent information if needed. Keyon Farias  reports that he quit smoking about 30 years ago. He has never used smokeless tobacco. He reports that he does not drink alcohol or use drugs.    Family History   I have reviewed this patient's family history and updated it with pertinent information if needed.   Family History   Problem Relation Age of Onset     Cancer Father        Review of Systems   Review of systems not obtained due to patient factors - essentially nonverbal, has TBI. Mother not available by phone.    Physical Exam   Temp: 99  F (37.2  C) Temp src: Oral BP: 110/78 Pulse: 105 Heart Rate: 104 Resp: 18 SpO2: 94 % O2 Device: None (Room air)    Vital Signs with Ranges  Temp:  [99  F (37.2  C)-104.1  F (40.1  C)] 99  F (37.2  C)  Pulse:  [] 105  Heart Rate:  [102-106] 104  Resp:  [18-20] 18  BP: ()/(56-78) 110/78  SpO2:  [94 %-97 %] 94 %  0 lbs 0 oz    Constitutional: Awake, alert, cooperative, no apparent distress, smiling  Eyes: Conjunctiva and pupils examined and normal.  HEENT: Tacky mucous membranes, normal dentition.  Respiratory: Clear to auscultation bilaterally, no crackles or wheezing. Dry cough  Cardiovascular: Regular rate and rhythm, normal S1 and S2, and no murmur noted.  GI: Soft, non-distended, non-tender, normal bowel sounds.  Lymph/Hematologic: No anterior cervical or supraclavicular adenopathy.  Skin: No cyanosis, no edema.+ perianal rash  Musculoskeletal: No joint swelling, erythema or tenderness. Contracted lower  extremities.  Neurologic: Cranial nerves 2-12 intact, normal strength and sensation.  Psychiatric: Alert, makes eye contact, nods/shakes. Nonverbal, but makes noises.    Data   Data reviewed today:  I personally reviewed CXR with elevated right hemidiaphragm, but no consolidation/infiltrate seen.  Recent Labs   Lab 10/25/19  2000 10/21/19  0051   WBC 16.2* 16.7*   HGB 11.3* 11.9*   MCV 85 82    200    131*   POTASSIUM 3.9 3.7   CHLORIDE 99 98   CO2 31 29   BUN 19 17   CR 0.79 0.77   ANIONGAP 5 4   STEVE 8.6 8.2*   GLC 90 82   ALBUMIN 3.0*  --    PROTTOTAL 7.9  --    BILITOTAL 0.2  --    ALKPHOS 80  --    ALT 31  --    AST 22  --    LIPASE 262  --        Recent Results (from the past 24 hour(s))   XR Chest 2 Views    Narrative    CHEST TWO VIEWS 10/25/2019 8:54 PM     HISTORY: Fever. Vomiting. History of aspiration.    COMPARISON: None.    FINDINGS: Heart size and interstitial markings are exaggerated by low  lung volumes. No gross airspace consolidation, pneumothorax, or  pleural effusion.       Impression    IMPRESSION: No radiographic evidence of acute chest abnormality.     ANNETTE CASTRO MD

## 2019-10-26 NOTE — CONSULTS
CLINICAL NUTRITION SERVICES  -  ASSESSMENT NOTE      Recommendations Ordered by Registered Dietitian (RD):   Resume EN per home regimen (using formulary equivalent):   - GJT (feed J Port) - last exchange 8/2019  - Isosource 1.5 @ 100 ml/hr x 12 hours (7am-7pm)--> Initiate home EN @ half rate, 50 mL/hr x 4 hrs, then incr to goal 100 mL/hr. On 10.27, initiate EN at goal rate, 100 mL/hr.   - Provides 1800 kcals, 82 gm pro, 18 gm fiber, 912 mL H20, 211 gm CHO  - Add Nutrisource Fiber 1 pkt daily for additional 15 kcal, 3 g fiber  - Add Prosource 1 pkt BID for additional 80 kcal, 22 g protein  Total Provisions: 1900 kcals (28 kcal/kg and), 104 gm pro (1.5 gm/kg), 21 g fiber  - Fluid flushes of 60 ml q 4 hrs while receiving IVF - (once off IVF, recommend increase flushes to 150 mL every 4 hrs)   Malnutrition:   % Weight Loss:  Weight loss does not meet criteria for malnutrition (4% in the past 1 month)   % Intake: Unable to assess --> no family present to assess intakes PTA  Subcutaneous Fat Loss:  Orbital region depletion and Upper arm region depletion: mild  Muscle Loss: Temporal region mild depletion (pt with hemiplegia, decreased muscle, no acute loss)  Fluid Retention:  None noted    Malnutrition Diagnosis: Patient does not meet two of the above criteria necessary for diagnosing malnutrition     REASON FOR ASSESSMENT  Keyon Farias is a 59 year old male seen by Registered Dietitian for Provider Order - Registered Dietitian to Assess and Order TF per Medical Nutrition Therapy Protocol    NUTRITION HISTORY  - Information obtained from EMR.   - Pt is well-known to clinical nutrition services from several past admissions. Pt is nonverbal at baseline.   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   - Fluid flush: H2O flushes 120 mL QID.    Per last RD note: Pt lives with his mother, she likes to keep the TF rate at no more than 100 ml/hr. He is fed during the day rather than at night so that he can be up in the chair to prevent aspiration    CURRENT NUTRITION ORDERS  Diet Order:     NPO    Current Intake/Tolerance:  NPO --> TF not started yet    NUTRITION FOCUSED PHYSICAL ASSESSMENT FOR DIAGNOSING MALNUTRITION)  Completed:  Yes Full assessment         Observed:    Muscle wasting (refer to documentation in Malnutrition section) and Subcutaneous fat loss (refer to documentation in Malnutrition section)    Obtained from Chart/Interdisciplinary Team:  GJ tube     ANTHROPOMETRICS  Height: 6'0\"  Weight: 68.9 kg   BMI: 20.5 kg/m .  Weight Status:  Normal BMI  IBW: 80.9 kg   % IBW: 85%  Weight History: 4% weight loss in the past 1 month.   Wt Readings from Last 10 Encounters:   10/13/19 68.9 kg (152 lb)   10/10/19 69 kg (152 lb 1.9 oz)   09/27/19 73.9 kg (163 lb)   09/24/19 74 kg (163 lb 2.3 oz)   09/18/19 69.9 kg (154 lb 1.6 oz)   09/13/19 72 kg (158 lb 11.2 oz)   08/15/19 75.4 kg (166 lb 3.6 oz)   08/07/19 72.6 kg (160 lb)   06/19/19 72.4 kg (159 lb 11.2 oz)   05/30/19 74.5 kg (164 lb 3.9 oz)     LABS  Labs reviewed    MEDICATIONS  Medications reviewed  Neutra-Phos   NaCl @ 75 ml/hr     ASSESSED NUTRITION NEEDS PER APPROVED PRACTICE GUIDELINES:    Dosing Weight 68.9 kg (actual weight)  Estimated Energy Needs: 0385-4831 kcals (25-30 Kcal/Kg)  Justification: spastic hemiplegia  Estimated Protein Needs:  grams protein (1.2-1.5 g pro/Kg)  Justification: preservation of lean body mass  Estimated Fluid Needs: >1 mL/Kcal  Justification: " maintenance    MALNUTRITION:  % Weight Loss:  Weight loss does not meet criteria for malnutrition (4% in the past 1 month)   % Intake: Unable to assess --> no family present to assess intakes PTA  Subcutaneous Fat Loss:  Orbital region depletion and Upper arm region depletion: mild  Muscle Loss: Temporal region mild depletion (pt with hemiplegia, decreased muscle, no acute loss)  Fluid Retention:  None noted    Malnutrition Diagnosis: Patient does not meet two of the above criteria necessary for diagnosing malnutrition    NUTRITION DIAGNOSIS:  Inadequate protein-energy intake related to reliance on EN to meet 100% of assessed needs as evidenced by tube feeding not started yet since admission, receiving 0% of energy and protein needs.     NUTRITION INTERVENTIONS  Recommendations / Nutrition Prescription  Resume EN per home regimen (using formulary equivalent):   - GJT (feed J Port) - last exchange 8/2019  - Isosource 1.5 @ 100 ml/hr x 12 hours (7am-7pm)  - Provides 1800 kcals, 82 gm pro, 18 gm fiber, 912 mL H20, 211 gm CHO  - Add Nutrisource Fiber 1 pkt daily for additional 15 kcal, 3 g fiber  - Add Prosource 1 pkt BID for additional 80 kcal, 22 g protein  Total Provisions: 1900 kcals (28 kcal/kg and), 104 gm pro (1.5 gm/kg), 21 g fiber  - Fluid flushes of 60 ml q 4 hrs while receiving IVF - (once off IVF, recommend increase flushes to 150 mL every 4 hrs)    Implementation  Nutrition education: Per Provider order if indicated   EN Composition, EN Schedule and Feeding Tube Flush    Nutrition Goals  Pt will receive EN in the next 24 hrs.     MONITORING AND EVALUATION:  Progress towards goals will be monitored and evaluated per protocol and Practice Guidelines    Sánchez Restrepo RDN, LD    Clinical Dietitian  [facility] & [facility]

## 2019-10-26 NOTE — PLAN OF CARE
DATE & TIME: 10/26/19 6200- 1530  Cognitive Concerns/ Orientation : Alert, HECTOR orientation, nonverbal  BEHAVIOR & AGGRESSION TOOL COLOR: Green  CIWA SCORE: n/a  ABNL VS/O2: VSS on RA, afebrile  MOBILITY: Up w/lift, assist of 2 turn/repo  PAIN MANAGMENT: denies pain   DIET: NPO, TF started  OWEL/BLADDER: incontinent   ABNL LAB/BG: WBCs 11.9  DRAIN/DEVICES: G-tube; PIV, IVF  TELEMETRY RHYTHM: n/a  SKIN: redness/excoraition to groin/memo area  TESTS/PROCEDURES: n/a  D/C DAY/GOALS/PLACE: pending   OTHER IMPORTANT INFO: LS diminished, infrequent congested/nonproductive cough., TFs 7am- 7pm, started at 11;15 at 50 ml/ hr , needs to increase to 100cc at 15;15,with flushed 60 cc q 4hr.

## 2019-10-26 NOTE — PLAN OF CARE
ED admit for fever/cough. Alert, nonverbal at baseline, does smile/nod give thumbs up occasionally when asked questions. NPO, PEG. Bedrest, BLE/LUE limbs contracted. Mom (legal guardian) at the bedside, involved and supportive, says patient mostly refuses oral suctioning, loose cough but has difficulty clearing secretions. Perianal rash, blanchable erythema, painful to the touch. Discharge plan pending.

## 2019-10-26 NOTE — PHARMACY-ADMISSION MEDICATION HISTORY
Admission medication history interview status for the 10/25/2019  admission is complete. See EPIC admission navigator for prior to admission medications     Medication history source reliability:Moderate - patient is a frequent admission    Actions taken by pharmacist (provider contacted, etc): Tried to contact Mrs. Farias to verify last doses, but there was no answer. I have listed last doses as being given up to the time he arrived at the hospital.     Additional medication history information not noted on PTA med list :None    Medication reconciliation/reorder completed by provider prior to medication history? No    Time spent in this activity: 45 minutes    Prior to Admission medications    Medication Sig Last Dose Taking? Auth Provider   acetylcysteine (MUCOMYST) 20 % neb solution Take 2 mLs by nebulization every 6 hours With albuterol at 0700, 1100, 1500, and 1900 10/25/2019 at 1500 Yes Unknown, Entered By History   albuterol (PROVENTIL) (5 MG/ML) 0.5% neb solution Take 2.5 mg by nebulization every 4 hours (while awake) 0700 1100 1500 1900 with mucomyst  10/25/2019 at 1500 Yes Unknown, Entered By History   aspirin (ASA) 81 MG chewable tablet 81 mg by Oral or Feeding Tube route daily At 0900 10/25/2019 at Unknown time Yes Unknown, Entered By History   bacitracin ointment Apply topically daily as needed for wound care To PEG site.  as needed Yes Unknown, Entered By History   Brivaracetam (BRIVIACT) 10 MG/ML solution 100 mg by Oral or Feeding Tube route 2 times daily 0900, 2100 10/25/2019 at 0900 Yes Unknown, Entered By History   calcium carbonate 1250 (500 CA) MG/5ML SUSP suspension 5 mLs (1,250 mg) by Per J Tube route 3 times daily (with meals) 10/25/2019 at 1500 Yes Mariana Venegas MD   carBAMazepine (TEGRETOL) 100 MG/5ML suspension 150 mg by Oral or Feeding Tube route every 6 hours At 06:00, 12:00, 18:00 and 24:00 for seizures  10/25/2019 at 1800? Yes Unknown, Entered By History   ferrous sulfate 220  (44 Fe) MG/5ML ELIX 220 mg by Per Feeding Tube route daily 10/25/2019 at 0900 Yes Unknown, Entered By History   Guar Gum (FIBER MODULAR, NUTRISOURCE FIBER,) packet 1 packet by Per G Tube route daily 10/25/2019 at 0900 Yes Starr Champion MD   hydrocortisone (CORTEF) 5 MG tablet 10 mg by Oral or FT or NG tube route daily (with dinner) At 1500 10/25/2019 at 1500 Yes Unknown, Entered By History   hydrocortisone (CORTEF) 5 MG tablet 20 mg by Oral or FT or NG tube route every morning  10/25/2019 at 0900 Yes Unknown, Entered By History   hydrocortisone 1 % CREA cream Place rectally 2 times daily as needed for other Apply to reddened memo areas as needed as needed Yes Unknown, Entered By History   ipratropium - albuterol 0.5 mg/2.5 mg/3 mL (DUONEB) 0.5-2.5 (3) MG/3ML neb solution Take 1 vial by nebulization every 4 hours as needed (bronchospasms or cough) as needed Yes Unknown, Entered By History   levothyroxine (SYNTHROID/LEVOTHROID) 150 MCG tablet Take 150 mcg by mouth every morning 10/25/2019 at Unknown time Yes Unknown, Entered By History   melatonin (MELATONIN) 1 MG/ML LIQD liquid 6 mg by Per NG tube route At Bedtime  10/24/2019 at Unknown time Yes Unknown, Entered By History   metoclopramide (REGLAN) 5 MG/5ML solution 5 mg by Per Feeding Tube route 2 times daily 10/25/2019 at am Yes Unknown, Entered By History   miconazole (MICATIN) 2 % AERP powder Apply topically 2 times daily as needed  as needed Yes Unknown, Entered By History   mupirocin (BACTROBAN) 2 % external ointment Apply topically 2 times daily as needed  as needed Yes Reported, Patient   pantoprazole (PROTONIX) 2 mg/mL SUSP suspension 20 mLs (40 mg) by Per J Tube route daily 10/25/2019 at 0900 Yes Washington Connors MD   potassium & sodium phosphates (NEUTRA-PHOS) 280-160-250 MG Packet Take 1 packet by mouth 2 times daily  10/25/2019 at 0900 Yes Yanely Liriano MD   Scopolamine HBr POWD 0.4 mg by Oral or Feeding Tube route 3 times daily 10/25/2019 at  Unknown time Yes Unknown, Entered By History   Skin Protectants, Misc. (BALMEX SKIN PROTECTANT) OINT Externally apply topically 2 times daily as needed (irritation) Applay to reddened memo areas twice daily as needed as needed Yes Unknown, Entered By History   testosterone cypionate (DEPOTESTOTERONE CYPIONATE) 200 MG/ML injection Inject 76 mg into the muscle See Admin Instructions Every 2 weeks on Fridays   76 mg or 0.38 mL unknown last dose Yes Unknown, Entered By History   vitamin D3 (CHOLECALCIFEROL) 2000 units (50 mcg) tablet Take 2,000 Units by mouth daily Crush and feed via j-tube @@ 0900 10/25/2019 at 0900 Yes Unknown, Entered By History   Bioflavonoid Products (VITAMIN C PLUS) 1000 MG TABS 1 tablet by Oral or FT or NG tube route   Unknown, Entered By History   order for DME Equipment being ordered: Nebulizer   Starr Champion MD

## 2019-10-26 NOTE — ED NOTES
DATE:  10/25/2019   TIME OF RECEIPT FROM LAB:  1952LAB TEST:  Lactic acid  LAB VALUE:  2.7  RESULTS GIVEN WITH READ-BACK TO (PROVIDER):  Dr. Estrada  TIME LAB VALUE REPORTED TO PROVIDER:   Shanda

## 2019-10-26 NOTE — PLAN OF CARE
DATE & TIME: 10/26/19 9210-1434             Cognitive Concerns/ Orientation : Alert, HECTOR orientation, nonverbal  BEHAVIOR & AGGRESSION TOOL COLOR: Green  CIWA SCORE: n/a  ABNL VS/O2: VSS on RA  MOBILITY: Up w/lift, assist of 2 turn/repo  PAIN MANAGMENT: denies pain   DIET: NPO, nutrition consulted to resume tube feedings   BOWEL/BLADDER: due to void   ABNL LAB/BG: n/a  DRAIN/DEVICES: G-tube; PIV, IVF  TELEMETRY RHYTHM: n/a  SKIN: redness/excoraition to groin/memo area  TESTS/PROCEDURES: n/a  D/C DAY/GOALS/PLACE: pending   OTHER IMPORTANT INFO: LS diminished, infrequent congested/nonproductive cough.

## 2019-10-26 NOTE — PLAN OF CARE
Pt here with suspected aspiration pneumonia. Unable to assess orientation due to incomprehensible speech. Lung sounds diminished in lower lobes. VSS on RA- continuous pulse ox in place. NPO diet. PEG tube in place- flushes easily and used for medications. Up with A2 lift. Turn and reposition.  FLACC 0. Resting comfortably. Pt scoring green on the Aggression Stop Light Tool. Rash to perineum red and blanchable. Contact precautions for MRSA and VRE.  Pt transferred to station 66. Per 6th floor RN she will update guardian of transfer in morning.

## 2019-10-26 NOTE — PROGRESS NOTES
[facility]    Medicine Progress Note - Hospitalist Service       Date of Admission:  10/25/2019  Assessment & Plan   Keyon Farias is a 59 year old male with severe traumatic brain injury and severe dysphagia on tube feedings who was sent to the emergency room from his care facility due to fever.  He has had 17 hospitalizations here this year alone for fever and presumed aspiration pneumonia/pneumonitis.    Fever  Resolved lactic acidosis  Resolved vomiting  The patient had a negative chest x-ray and 1 temperature of 104  F but otherwise is been afebrile.  He has a leukocytosis which is improving.  Procalcitonin was 0.9 yesterday.  No evidence of a urinary tract infection.  Cultures are no growth to date.    Repeat procalcitonin along with a CRP, monitor fever curve and white blood cell count    Continue Augmentin for now    Seizure disorder   Follows with Dr. Valdes through [facility] of neurology.    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 evening, 20 mg every morning    Continue PTA levothyroxine 150 mg daily    Continue PTA testosterone on discharge     Perianal rash  Consistent with Candida in the setting of urinary incontinence.     Continue miconazole powder     Severe dysphagia status post GJ tube on tube feedings    Nutrition consulted for reinitiation of tube feeding    DVT Prophylaxis: Pneumatic Compression Devices  Code Status: Full Code  Expected discharge: 24-48 hours, recommended to prior living arrangement once fevers resolve, no further vomiting    Keeley Claros,        Diet: NPO for Medical/Clinical Reasons Except for: No Exceptions  Adult Formula Drip Feeding: Continuous Isosource 1.5; Jejunostomy; Goal Rate: 100; mL/hr; From: 7:00 AM; 7:00 PM; Medication - Feeding Tube Flush Frequency: At least 15-30 mL water before and after medication administration and with tube clogging;...    DVT  Prophylaxis: Pneumatic Compression Devices  Lerma Catheter: not present  Code Status: Full Code      Disposition Plan   Expected discharge: 2 - 3 days, recommended to prior living arrangement once antibiotic plan established.  Entered: Edgar Jenkins MD 10/26/2019, 12:42 PM       The patient's care was discussed with the Care Coordinator/.    Edgar Jenkins MD  Hospitalist Service  [facility]    ______________________________________________________________________    Interval History   Unable to obtain     Data reviewed today: I reviewed all medications, new labs and imaging results over the last 24 hours. I personally reviewed no images or EKG's today.    Physical Exam   Vital Signs: Temp: 97.7  F (36.5  C) Temp src: Axillary BP: 110/65 Pulse: 76 Heart Rate: 78 Resp: 18 SpO2: 96 % O2 Device: None (Room air)    Weight: 0 lbs 0 oz  Constitutional: Not in any distress  Respiratory: No increased work of breathing, good air exchange, clear to auscultation bilaterally, no crackles or wheezing  Cardiovascular: regular rate and rhythm, normal S1 and S2, no S3 or S4, and no murmur noted; no lower extremity edema  GI: normal bowel sounds, soft, non-distended, PEG site intact  Skin: no rashes and no jaundice  Neuropsychiatric: General: normal, calm     Data   Recent Labs   Lab 10/26/19  0837 10/25/19  2000 10/21/19  0051   WBC 11.9* 16.2* 16.7*   HGB 9.8* 11.3* 11.9*   MCV 84 85 82    222 200    135 131*   POTASSIUM 3.8 3.9 3.7   CHLORIDE 109 99 98   CO2 27 31 29   BUN 14 19 17   CR 0.85 0.79 0.77   ANIONGAP 3 5 4   STEVE 7.1* 8.6 8.2*   GLC 85 90 82   ALBUMIN  --  3.0*  --    PROTTOTAL  --  7.9  --    BILITOTAL  --  0.2  --    ALKPHOS  --  80  --    ALT  --  31  --    AST  --  22  --    LIPASE  --  262  --      Recent Results (from the past 24 hour(s))   XR Chest 2 Views    Narrative    CHEST TWO VIEWS 10/25/2019 8:54 PM     HISTORY: Fever. Vomiting. History of  aspiration.    COMPARISON: None.    FINDINGS: Heart size and interstitial markings are exaggerated by low  lung volumes. No gross airspace consolidation, pneumothorax, or  pleural effusion.       Impression    IMPRESSION: No radiographic evidence of acute chest abnormality.     ANNETTE CASTRO MD     Medications     IV fluid REPLACEMENT ONLY       sodium chloride 1,000 mL (10/26/19 0943)       acetylcysteine  2 mL Nebulization Q6H     albuterol  2.5 mg Nebulization Q4H While awake     amoxicillin-clavulanate  1 tablet Oral or Feeding Tube Q12H ALONDRA     Brivaracetam  100 mg Oral or Feeding Tube BID     carBAMazepine  150 mg Oral or Feeding Tube Q6H     fiber modular (NUTRISOURCE FIBER)  1 packet Per Feeding Tube Daily     hydrocortisone  10 mg Per G Tube Daily with supper     hydrocortisone  20 mg Per G Tube QAM     [START ON 10/27/2019] levothyroxine  150 mcg Oral or Feeding Tube QAM     melatonin  6 mg Per NG tube At Bedtime     metoclopramide  5 mg Per Feeding Tube BID     pantoprazole  40 mg Per J Tube Daily     potassium & sodium phosphates  1 packet Oral or Feeding Tube BID     protein modular  1 packet Per Feeding Tube BID     Scopolamine HBr  0.4 mg Oral or Feeding Tube TID

## 2019-10-26 NOTE — ED NOTES
"[facility]  ED Nurse Handoff Report    ED Chief complaint: Fever (Pt's fevers up to 102 at home. No tylenol at home.)      ED Diagnosis:   Final diagnoses:   Fever, unspecified fever cause   Nonverbal   History of aspiration pneumonia       Code Status: Full Code    Allergies:   Allergies   Allergen Reactions     Dilantin [Phenytoin Sodium]      Valproic Acid      Toxicity c bone marrow suspension, elevated ammonia levels        Activity level - Baseline/Home:  Total Care  Activity Level - Current:   Total Care    Patient's Preferred language: English-nonverbal   Needed?: No    Isolation: Yes  Infection: Not Applicable  MRSA, VRE  Bariatric?: No    Vital Signs:   Vitals:    10/25/19 1858 10/25/19 1919 10/25/19 2000 10/25/19 2015   BP: 114/56  112/58 114/57   Pulse:   71 126   Resp: 20      Temp: 99  F (37.2  C) 104.1  F (40.1  C)     TempSrc: Oral Rectal     SpO2: 96%  95% 94%       Cardiac Rhythm: ,        Pain level: 0-10 Pain Scale: 0    Is this patient confused?: Yes   Does this patient have a guardian?  Yes         If yes, is there guardianship documents in the Epic \"Code/ACP\" activity?  Yes         Guardian Notified?  No-attempts made by MD x2; unable to leave voicemail  [place] - Suicide Severity Rating Scale Completed?  Yes  If yes, what color did the patient score?  White    Patient Report: Initial Complaint: Pt brought in by EMS for fever. Pt frequently here for fevers and sepsis.  Focused Assessment: Rectal temp 104.1. Hot to touch. Nonproductive cough. PEG tube in place in abdomen.  Tests Performed: labs and chest x-ray  Abnormal Results: lactic 2.7  Treatments provided: 1L NS, 1000mg tylenol, and 4.5gm zosyn    Family Comments: None present. Attempted to call pt's mother x2 but unable to reach. Voicemail states it is full.    OBS brochure/video discussed/provided to patient/family: N/A              Name of person given brochure if not patient: N/A              Relationship " to patient: N/A    ED Medications:   Medications   piperacillin-tazobactam (ZOSYN) 4.5 g vial to attach to  mL bag (has no administration in time range)   0.9% sodium chloride BOLUS (1,000 mLs Intravenous New Bag 10/25/19 2027)     Followed by   sodium chloride 0.9% infusion (has no administration in time range)   acetaminophen (TYLENOL) solution 1,000 mg (1,000 mg Per GJ tube Given 10/25/19 1949)       Drips infusing?:  No    For the majority of the shift this patient was Yellow.   Interventions performed were reassurance and information.    Severe Sepsis OR Septic Shock Diagnosis Present:   Yes    Per the ED Provider, Time Zero for severe sepsis or septic shock is:  1954    3 Hour Severe Sepsis Bundle Completion:  1. Initial Lactic Acid Result:   Recent Labs   Lab Test 10/25/19  1948 10/21/19  0113 10/13/19  1941   LACT 2.7* 1.8 1.3     2. Blood Cultures before Antibiotics: Yes  3. Broad Spectrum Antibiotics Administered:     Anti-infectives (From now, onward)    Start     Dose/Rate Route Frequency Ordered Stop    10/25/19 2014  piperacillin-tazobactam (ZOSYN) 4.5 g vial to attach to  mL bag      4.5 g  over 30 Minutes Intravenous ONCE 10/25/19 2013          4. 1000 ml of IV fluids have been given so far      6 Hour Severe Sepsis Bundle Completion:    1. Repeat Lactic Acid Level: Not drawn  2. Patient currently on Vasopressors =  No    To be done/followed up on inpatient unit:  continue sepsis protocol    ED NURSE PHONE NUMBER: [phone]

## 2019-10-26 NOTE — PROGRESS NOTES
RECEIVING UNIT ED HANDOFF REVIEW    ED Nurse Handoff Report was reviewed by: Nory Roblero RN on October 25, 2019 at 9:53 PM

## 2019-10-26 NOTE — ED PROVIDER NOTES
History     Chief Complaint:  Fever       The history is limited by the condition of the patient.   Hx supplemented by electronic chart review.     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 Per chart review, the patient had two episodes of emesis and had a fever of 100 that then jumped to 102 and was not given any Tylenol at home. The patient is nonverbal at baseline.  Long history of aspiration and has GJ tube. Numerous hospitalizations this year for nearly identical episodes attributed to aspiration pneumonitis.      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   DVT of upper extremity   Gastro-oesophageal reflux disease      Panhypopituitarism   Seizures    Septic shock          Spastic hemiplegia affecting dominant side         Thyroid disease            Tracheostomy care         Traumatic brain injury         Unspecified cerebral artery occlusion with cerebral infarction            Ventricular fibrillation         Ventricular tachyarrhythmia  Appendicitis  Panhypopituitarism  Hematemesis  Sepsis  Community acquired pneumonia  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  Hyponatremia  PEG tube malfunction  Cardiac arrest  Acute respiratory failure with hypoxia  Necrotizing pancreatitis  Pneumonia, aspiration  Encephalopathy  Fever  Aspiration pneumonia  Acid reflux      Past Surgical History:    Right hand repair  Head and neck surgery;   vascular surgery;   Laparoscopic appendectomy  Esophagoscopy, gastroscopy, duodenoscopy   Tracheostomy   Laparoscopic assisted insertion tube gastrotomy  Necrosectomy  IR Gastro Jejunostomy Tube Change       Family History:    Cancer    Social  History:  Smoking status: Former smoker  Alcohol use: No  Drug use: No  PCP: Carlos Gomez      Review of Systems   Unable to perform ROS: Patient nonverbal       Physical Exam     Patient Vitals for the past 24 hrs:   BP Temp Temp src Pulse Heart Rate Resp SpO2   10/25/19 2015 114/57 -- -- 126 -- -- 94 %   10/25/19 2000 112/58 -- -- 71 -- -- 95 %   10/25/19 1919 -- 104.1  F (40.1  C) Rectal -- -- -- --   10/25/19 1858 114/56 99  F (37.2  C) Oral -- 106 20 96 %        Physical Exam   General: Chronically ill-appearing male recumbent in room 28  HENT: mucous membranes slightly dry, OP clear  CV: tachycardic rate  Resp: clear throughout, normal effort, no crackles or wheezing  GI: Abdomen soft, nontender, no peritonitis  Nontender external genitalia  MSK: no bony tenderness, no CVAT  Skin: appropriately warm and dry  Neuro: Awake, alert, follows basic commands, nonverbal (baseline), no nuchal rigidity  Psych: cooperative, nonverbal so unable to fully assess      Emergency Department Course     Imaging:  Radiology findings were communicated with the patient who voiced understanding of the findings.    Chest  XR:  No radiographic evidence of acute chest abnormality.     Reading per radiology      Laboratory:  Laboratory findings were communicated with the patient who voiced understanding of the findings.    CBC: WBC 16.2(H), HGB 11.3(H),   CMP: Albumin 3.0(L) o/w WNL (Creatinine 0.79)  UA with micro: pH 7.5(H), protein albumin 30, mucous present o/w negative   Lipase: 262  ISTAT gases lactate gabe POCT: pH: 7.35, PCO2: 55(H), PO2: 18(L), Bicarbonate: 30(H), O2 Sat: 23, Lactic acid: 2.7(H)      Procalcitonin: pending  Blood cultures: pending    Procedures    Interventions:  1949 Acetaminophen, 1000 mg, Per GJ tube  Zosyn IV    Emergency Department Course:   Nursing notes and vitals reviewed.    1910 I performed an exam of the patient as documented above.     1918 IV was inserted and blood was drawn for laboratory  testing, results above.     1921 The patient provided a urine sample here in the emergency department. This was sent for laboratory testing, findings above.     I performed electronic chart review in EPIC.    2008 I tried to call the patient's mother but her mailbox was full.     2054 The patient was sent for a Chest XR while in the emergency department, results above.      2105 I checked on and updated the patient.     2116  I spoke to Dr. Claros of the hospitalist service who accepts the patient for admission for observation.     2136 I spoke with the patient's mother regarding his care and plan.      Impression & Plan      Medical Decision Making:  He presents with recurrent fever and vomiting with suspicion for aspiration pneumonitis.  His chest x-ray appears to perhaps have some abnormality in the right lower lobe, the radiology does not appreciate any acute findings.  He felt warm so despite a normal initial temperature, rectal temperature was checked and was quite elevated.  He was thereafter given Tylenol.  I think he requires hospitalization and close monitoring as well as antibiotics given his comorbidities and risk of deterioration.  He has had presentations just like this on many occasions.  I do not think he requires emergent advanced imaging given an absence of peritonitis or other more ominous findings to suggest an acutely surgical process.  I attempted to reach his mother several times by phone but was not able to leave a message.  Soon after I did arrange for admission, she called back and I updated her on his situation, and she fully agrees with the plan in place.    Diagnosis:    ICD-10-CM    1. Fever, unspecified fever cause R50.9 Comprehensive metabolic panel     Lipase     Blood culture     Procalcitonin     Procalcitonin     CANCELED: Procalcitonin   2. Nonverbal R47.01    3. History of aspiration pneumonia Z87.01      Disposition:   The patient is admitted into the care of Dr. Claros.      Scribe Disclosure:  I, Jossie Kimber, am serving as a scribe at 1910 on 10/25/2019 to document services personally performed by Kenneth Estrada MD based on my observations and the provider's statements to me.   EMERGENCY DEPARTMENT    This record was created at least in part using electronic voice recognition software, so please excuse any typographical errors.         Kenneth Estrada MD  10/26/19 0043

## 2019-10-27 LAB
ERYTHROCYTE [DISTWIDTH] IN BLOOD BY AUTOMATED COUNT: 14.5 % (ref 10–15)
HCT VFR BLD AUTO: 32.2 % (ref 40–53)
HGB BLD-MCNC: 10.4 G/DL (ref 13.3–17.7)
MCH RBC QN AUTO: 27.4 PG (ref 26.5–33)
MCHC RBC AUTO-ENTMCNC: 32.3 G/DL (ref 31.5–36.5)
MCV RBC AUTO: 85 FL (ref 78–100)
PLATELET # BLD AUTO: 210 10E9/L (ref 150–450)
RBC # BLD AUTO: 3.8 10E12/L (ref 4.4–5.9)
WBC # BLD AUTO: 7.4 10E9/L (ref 4–11)

## 2019-10-27 PROCEDURE — G0378 HOSPITAL OBSERVATION PER HR: HCPCS

## 2019-10-27 PROCEDURE — 99207 ZZC CDG-CODE CATEGORY CHANGED: CPT | Performed by: HOSPITALIST

## 2019-10-27 PROCEDURE — 36415 COLL VENOUS BLD VENIPUNCTURE: CPT | Performed by: HOSPITALIST

## 2019-10-27 PROCEDURE — 25000132 ZZH RX MED GY IP 250 OP 250 PS 637: Mod: GY

## 2019-10-27 PROCEDURE — 40000275 ZZH STATISTIC RCP TIME EA 10 MIN

## 2019-10-27 PROCEDURE — 25000125 ZZHC RX 250: Performed by: HOSPITALIST

## 2019-10-27 PROCEDURE — 25000132 ZZH RX MED GY IP 250 OP 250 PS 637: Mod: GY | Performed by: HOSPITALIST

## 2019-10-27 PROCEDURE — 27210429 ZZH NUTRITION PRODUCT INTERMEDIATE LITER

## 2019-10-27 PROCEDURE — 99225 ZZC SUBSEQUENT OBSERVATION CARE,LEVEL II: CPT | Performed by: HOSPITALIST

## 2019-10-27 PROCEDURE — 94640 AIRWAY INHALATION TREATMENT: CPT | Mod: 76

## 2019-10-27 PROCEDURE — 85027 COMPLETE CBC AUTOMATED: CPT | Performed by: HOSPITALIST

## 2019-10-27 RX ORDER — AMOXICILLIN AND CLAVULANATE POTASSIUM 400; 57 MG/5ML; MG/5ML
875 POWDER, FOR SUSPENSION ORAL EVERY 12 HOURS
Status: DISCONTINUED | OUTPATIENT
Start: 2019-10-27 | End: 2019-10-28

## 2019-10-27 RX ORDER — SCOPOLAMINE HYDROBROMIDE
0.4 POWDER (GRAM) MISCELLANEOUS 3 TIMES DAILY
Status: DISCONTINUED | OUTPATIENT
Start: 2019-10-27 | End: 2019-10-29 | Stop reason: HOSPADM

## 2019-10-27 RX ADMIN — CARBAMAZEPINE 150 MG: 100 SUSPENSION ORAL at 18:25

## 2019-10-27 RX ADMIN — CARBAMAZEPINE 150 MG: 100 SUSPENSION ORAL at 00:37

## 2019-10-27 RX ADMIN — LEVOTHYROXINE SODIUM 150 MCG: 150 TABLET ORAL at 06:16

## 2019-10-27 RX ADMIN — Medication 0.4 MG: at 21:10

## 2019-10-27 RX ADMIN — ACETYLCYSTEINE 2 ML: 200 SOLUTION ORAL; RESPIRATORY (INHALATION) at 07:16

## 2019-10-27 RX ADMIN — HYDROCORTISONE 10 MG: 10 TABLET ORAL at 16:23

## 2019-10-27 RX ADMIN — BRIVARACETAM 100 MG: 10 SOLUTION ORAL at 21:09

## 2019-10-27 RX ADMIN — ALBUTEROL SULFATE 2.5 MG: 2.5 SOLUTION RESPIRATORY (INHALATION) at 15:10

## 2019-10-27 RX ADMIN — HYDROCORTISONE 20 MG: 20 TABLET ORAL at 09:48

## 2019-10-27 RX ADMIN — METOCLOPRAMIDE 5 MG: 5 SOLUTION ORAL at 20:24

## 2019-10-27 RX ADMIN — ALBUTEROL SULFATE 2.5 MG: 2.5 SOLUTION RESPIRATORY (INHALATION) at 07:16

## 2019-10-27 RX ADMIN — ACETYLCYSTEINE 2 ML: 200 SOLUTION ORAL; RESPIRATORY (INHALATION) at 19:43

## 2019-10-27 RX ADMIN — PANTOPRAZOLE SODIUM 40 MG: 40 TABLET, DELAYED RELEASE ORAL at 09:48

## 2019-10-27 RX ADMIN — Medication 1 PACKET: at 16:18

## 2019-10-27 RX ADMIN — ALBUTEROL SULFATE 2.5 MG: 2.5 SOLUTION RESPIRATORY (INHALATION) at 12:26

## 2019-10-27 RX ADMIN — ACETYLCYSTEINE 2 ML: 200 SOLUTION ORAL; RESPIRATORY (INHALATION) at 12:26

## 2019-10-27 RX ADMIN — Medication 0.4 MG: at 16:25

## 2019-10-27 RX ADMIN — ALBUTEROL SULFATE 2.5 MG: 2.5 SOLUTION RESPIRATORY (INHALATION) at 19:43

## 2019-10-27 RX ADMIN — BRIVARACETAM 100 MG: 10 SOLUTION ORAL at 12:07

## 2019-10-27 RX ADMIN — POTASSIUM & SODIUM PHOSPHATES POWDER PACK 280-160-250 MG 1 PACKET: 280-160-250 PACK at 16:24

## 2019-10-27 RX ADMIN — MICONAZOLE NITRATE: 20 POWDER TOPICAL at 09:48

## 2019-10-27 RX ADMIN — CARBAMAZEPINE 150 MG: 100 SUSPENSION ORAL at 12:08

## 2019-10-27 RX ADMIN — CARBAMAZEPINE 150 MG: 100 SUSPENSION ORAL at 06:14

## 2019-10-27 RX ADMIN — Medication 0.4 MG: at 09:48

## 2019-10-27 RX ADMIN — METOCLOPRAMIDE 5 MG: 5 SOLUTION ORAL at 09:48

## 2019-10-27 RX ADMIN — AMOXICILLIN AND CLAVULANATE POTASSIUM 875 MG: 400; 57 POWDER, FOR SUSPENSION ORAL at 12:07

## 2019-10-27 RX ADMIN — Medication 1 PACKET: at 16:22

## 2019-10-27 RX ADMIN — ACETYLCYSTEINE 2 ML: 200 SOLUTION ORAL; RESPIRATORY (INHALATION) at 23:28

## 2019-10-27 RX ADMIN — Medication 1 PACKET: at 20:24

## 2019-10-27 RX ADMIN — AMOXICILLIN AND CLAVULANATE POTASSIUM 875 MG: 400; 57 POWDER, FOR SUSPENSION ORAL at 20:24

## 2019-10-27 RX ADMIN — MELATONIN 6 MG: 3 TAB ORAL at 21:09

## 2019-10-27 RX ADMIN — POTASSIUM & SODIUM PHOSPHATES POWDER PACK 280-160-250 MG 1 PACKET: 280-160-250 PACK at 09:48

## 2019-10-27 RX ADMIN — ALBUTEROL SULFATE 2.5 MG: 2.5 SOLUTION RESPIRATORY (INHALATION) at 23:27

## 2019-10-27 NOTE — PLAN OF CARE
DATE & TIME: 10/26/2019 8314-1697    Cognitive Concerns/ Orientation : Alert- HECTOR orientation, nonverbal   BEHAVIOR & AGGRESSION TOOL COLOR: Green  CIWA SCORE: NA   ABNL VS/O2: VSS on RA, afebrile  MOBILITY: Ax2, total cares, T/R Q2h, up w/ lift  PAIN MANAGMENT: FLACC 0  DIET: NPO, feeding tube 7am-7pm @ 100cc/hr w 60cc flush Q4h  BOWEL/BLADDER: incontinent of B/B,   ABNL LAB/BG: CRP 87.3  DRAIN/DEVICES: G tube, PIV R arm SL  TELEMETRY RHYTHM: NA  SKIN: coccyx redness/excoriation, nonblanchable- hydrocortisone cream applied,   TESTS/PROCEDURES: NA  D/C DAY/GOALS/PLACE: pending progress, ABX admin, expected 2-3 days  OTHER IMPORTANT INFO: contact for MRSA and VRE, infrequent congested;non productive cough

## 2019-10-27 NOTE — PLAN OF CARE
DATE & TIME: Day shift, 10/27/19   Cognitive Concerns/ Orientation : alert and oriented x4, non verbal   BEHAVIOR & AGGRESSION TOOL COLOR: GREEN  CIWA SCORE: NA   ABNL VS/O2: WDL on RA  MOBILITY: lift, turned and repo every 2 hours  PAIN MANAGMENT: none needed, FLACC score 0  DIET: strict NPO, TF running at goal of 100 ml/hr from 7-7.   BOWEL/BLADDER: condom catheter in place- good output. No BM this shift  ABNL LAB/BG: none  DRAIN/DEVICES: GJ tube- TF running in J tube- G tube clamped.  TELEMETRY RHYTHM: NA  SKIN: pale, bruised. Buttocks excoriated- per mom- NO BARRIER CREAM. Only antifungal powder. Rash on R hip (most likely fungal per MD)- applied antifungal powder.   TESTS/PROCEDURES: none  D/C DAY/GOALS/PLACE: MD to talk to mom about goals of care moving forward.  OTHER IMPORTANT INFO: Use condom catheter to prevent skin breakdown

## 2019-10-27 NOTE — PLAN OF CARE
DATE & TIME: 10/26 6167-0275   Cognitive Concerns/ Orientation : Alert. Nonverbal at baseline, incomprehensible sounds  BEHAVIOR & AGGRESSION TOOL COLOR: Yellow - unable to make needs known  CIWA SCORE: NA  ABNL VS/O2: VSS on RA  MOBILITY: A2, lift; T/R Q2  PAIN MANAGMENT: Absence on nonverbal pain indicators  DIET: NPO with tube feed running at goal 7a-7p  BOWEL/BLADDER: Incontinent, Lg BM on eves  ABNL LAB/BG: CRP 87.3  DRAIN/DEVICES: IVSL, G tube clamped, condom cath removed by pt  TELEMETRY RHYTHM: NA  SKIN: Rash to R posterior hip, redness to memo area, bruised  TESTS/PROCEDURES: NA  D/C DAY/GOALS/PLACE: prior living arrangement in 1-2 days pending  OTHER IMPORTANT INFO: Contact precautions

## 2019-10-27 NOTE — PROGRESS NOTES
[facility]    Medicine Progress Note - Hospitalist Service       Date of Admission:  10/25/2019  Assessment & Plan   Keyon Farias is a 59 year old male with severe traumatic brain injury and severe dysphagia on tube feedings who was sent to the emergency room from his care facility due to fever.  He has had 17 hospitalizations here this year alone for fever and presumed aspiration pneumonia/pneumonitis.    Fever, resolved  Resolved lactic acidosis  Resolved vomiting  The patient had a negative chest x-ray and 1 temperature of 104  F but otherwise is been afebrile.  He has a leukocytosis which is improving.  Procalcitonin was 0.9 yesterday.  No evidence of a urinary tract infection.  Cultures are no growth to date.  Suspect that he did aspirate- he is currently on room air.    Continue Augmentin     Seizure disorder   Follows with Dr. Valdes through [facility] of neurology.    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 evening, 20 mg every morning    Continue PTA levothyroxine 150 mg daily    Continue PTA testosterone on discharge     Perianal rash  Consistent with Candida in the setting of urinary incontinence.     Continue miconazole powder     Severe dysphagia status post GJ tube on tube feedings    Continue tube feeding    Diet: NPO for Medical/Clinical Reasons Except for: No Exceptions  Adult Formula Drip Feeding: Continuous Isosource 1.5; Jejunostomy; Goal Rate: 100; mL/hr; From: 7:00 AM; 7:00 PM; Medication - Feeding Tube Flush Frequency: At least 15-30 mL water before and after medication administration and with tube clogging;...    DVT Prophylaxis: Pneumatic Compression Devices  Lerma Catheter: not present  Code Status: Full Code      Disposition Plan   Expected discharge: tomorrow, recommended to prior living arrangement once antibiotic plan established.  Entered: Edgar Jenkins MD 10/27/2019,  12:34 PM       The patient's care was discussed with the nurse     Edgar Jenkins MD  Hospitalist Service  [facility]    ______________________________________________________________________    Interval History   Unable to obtain     Data reviewed today: I reviewed all medications, new labs and imaging results over the last 24 hours. I personally reviewed no images or EKG's today.    Physical Exam   Vital Signs: Temp: 98  F (36.7  C) Temp src: Oral BP: 113/62 Pulse: 80 Heart Rate: 83 Resp: 14 SpO2: 97 % O2 Device: None (Room air)    Weight: 159 lbs 9.81 oz  Constitutional: Not in any distress  Respiratory: bilateral rhonchi   Cardiovascular: regular rate and rhythm, normal S1 and S2, no S3 or S4, and no murmur noted; no lower extremity edema  GI: normal bowel sounds, soft, non-distended, PEG site intact  Skin: erythema and scaling on buttocks  Neuropsychiatric: General: normal, calm; awake and following simple commands    Data   Recent Labs   Lab 10/27/19  0856 10/26/19  0837 10/25/19  2000 10/21/19  0051   WBC 7.4 11.9* 16.2* 16.7*   HGB 10.4* 9.8* 11.3* 11.9*   MCV 85 84 85 82    181 222 200   NA  --  139 135 131*   POTASSIUM  --  3.8 3.9 3.7   CHLORIDE  --  109 99 98   CO2  --  27 31 29   BUN  --  14 19 17   CR  --  0.85 0.79 0.77   ANIONGAP  --  3 5 4   STEVE  --  7.1* 8.6 8.2*   GLC  --  85 90 82   ALBUMIN  --   --  3.0*  --    PROTTOTAL  --   --  7.9  --    BILITOTAL  --   --  0.2  --    ALKPHOS  --   --  80  --    ALT  --   --  31  --    AST  --   --  22  --    LIPASE  --   --  262  --      No results found for this or any previous visit (from the past 24 hour(s)).  Medications     IV fluid REPLACEMENT ONLY         acetylcysteine  2 mL Nebulization Q6H     albuterol  2.5 mg Nebulization Q4H While awake     amoxicillin-clavulanate  875 mg Oral or Feeding Tube Q12H     Brivaracetam  100 mg Oral or Feeding Tube BID     carBAMazepine  150 mg Oral or Feeding Tube Q6H     fiber  modular (NUTRISOURCE FIBER)  1 packet Per Feeding Tube Daily     hydrocortisone  10 mg Per G Tube Daily with supper     hydrocortisone  20 mg Per G Tube QAM     levothyroxine  150 mcg Oral or Feeding Tube QAM     melatonin  6 mg Per NG tube At Bedtime     metoclopramide  5 mg Per Feeding Tube BID     pantoprazole  40 mg Per J Tube Daily     potassium & sodium phosphates  1 packet Oral or Feeding Tube BID     protein modular  1 packet Per Feeding Tube BID     Scopolamine HBr  0.4 mg Oral or Feeding Tube TID

## 2019-10-27 NOTE — PROGRESS NOTES
Pt remains on RA,SpO2 mid 90s, BSS course/ Rhonchi bilateral. All scheduled nebs given as ordered. Will continue to monitor per MD order.    Artem Augustine RT on 10/27/2019 at 5:57 PM

## 2019-10-28 LAB
ANION GAP SERPL CALCULATED.3IONS-SCNC: 4 MMOL/L (ref 3–14)
BUN SERPL-MCNC: 15 MG/DL (ref 7–30)
CALCIUM SERPL-MCNC: 8.4 MG/DL (ref 8.5–10.1)
CHLORIDE SERPL-SCNC: 103 MMOL/L (ref 94–109)
CO2 SERPL-SCNC: 27 MMOL/L (ref 20–32)
CREAT SERPL-MCNC: 0.81 MG/DL (ref 0.66–1.25)
GFR SERPL CREATININE-BSD FRML MDRD: >90 ML/MIN/{1.73_M2}
GLUCOSE SERPL-MCNC: 83 MG/DL (ref 70–99)
MAGNESIUM SERPL-MCNC: 2.1 MG/DL (ref 1.6–2.3)
PHOSPHATE SERPL-MCNC: 2.7 MG/DL (ref 2.5–4.5)
POTASSIUM SERPL-SCNC: 3.6 MMOL/L (ref 3.4–5.3)
SODIUM SERPL-SCNC: 134 MMOL/L (ref 133–144)

## 2019-10-28 PROCEDURE — 25000132 ZZH RX MED GY IP 250 OP 250 PS 637: Performed by: HOSPITALIST

## 2019-10-28 PROCEDURE — 25000132 ZZH RX MED GY IP 250 OP 250 PS 637: Mod: GY

## 2019-10-28 PROCEDURE — 83735 ASSAY OF MAGNESIUM: CPT | Performed by: HOSPITALIST

## 2019-10-28 PROCEDURE — 27210429 ZZH NUTRITION PRODUCT INTERMEDIATE LITER

## 2019-10-28 PROCEDURE — 94640 AIRWAY INHALATION TREATMENT: CPT

## 2019-10-28 PROCEDURE — 84100 ASSAY OF PHOSPHORUS: CPT | Performed by: HOSPITALIST

## 2019-10-28 PROCEDURE — 25000125 ZZHC RX 250: Performed by: INTERNAL MEDICINE

## 2019-10-28 PROCEDURE — 40000275 ZZH STATISTIC RCP TIME EA 10 MIN

## 2019-10-28 PROCEDURE — 80048 BASIC METABOLIC PNL TOTAL CA: CPT | Performed by: HOSPITALIST

## 2019-10-28 PROCEDURE — 99225 ZZC SUBSEQUENT OBSERVATION CARE,LEVEL II: CPT | Performed by: HOSPITALIST

## 2019-10-28 PROCEDURE — 99207 ZZC CDG-CODE CATEGORY CHANGED: CPT | Performed by: HOSPITALIST

## 2019-10-28 PROCEDURE — 36415 COLL VENOUS BLD VENIPUNCTURE: CPT | Performed by: HOSPITALIST

## 2019-10-28 PROCEDURE — 25000125 ZZHC RX 250: Performed by: HOSPITALIST

## 2019-10-28 PROCEDURE — 25000132 ZZH RX MED GY IP 250 OP 250 PS 637: Mod: GY | Performed by: HOSPITALIST

## 2019-10-28 PROCEDURE — 94640 AIRWAY INHALATION TREATMENT: CPT | Mod: 76

## 2019-10-28 PROCEDURE — G0378 HOSPITAL OBSERVATION PER HR: HCPCS

## 2019-10-28 RX ORDER — SCOLOPAMINE TRANSDERMAL SYSTEM 1 MG/1
1 PATCH, EXTENDED RELEASE TRANSDERMAL
Status: DISCONTINUED | OUTPATIENT
Start: 2019-10-28 | End: 2019-10-29 | Stop reason: HOSPADM

## 2019-10-28 RX ORDER — SCOLOPAMINE TRANSDERMAL SYSTEM 1 MG/1
1 PATCH, EXTENDED RELEASE TRANSDERMAL
Status: DISCONTINUED | OUTPATIENT
Start: 2019-10-28 | End: 2019-10-28

## 2019-10-28 RX ADMIN — Medication 0.4 MG: at 09:47

## 2019-10-28 RX ADMIN — AMOXICILLIN AND CLAVULANATE POTASSIUM 875 MG: 400; 57 POWDER, FOR SUSPENSION ORAL at 09:48

## 2019-10-28 RX ADMIN — ACETYLCYSTEINE 2 ML: 200 SOLUTION ORAL; RESPIRATORY (INHALATION) at 19:16

## 2019-10-28 RX ADMIN — BRIVARACETAM 100 MG: 10 SOLUTION ORAL at 09:48

## 2019-10-28 RX ADMIN — ACETYLCYSTEINE 4 ML: 200 SOLUTION ORAL; RESPIRATORY (INHALATION) at 08:10

## 2019-10-28 RX ADMIN — POTASSIUM & SODIUM PHOSPHATES POWDER PACK 280-160-250 MG 1 PACKET: 280-160-250 PACK at 18:10

## 2019-10-28 RX ADMIN — CARBAMAZEPINE 150 MG: 100 SUSPENSION ORAL at 11:55

## 2019-10-28 RX ADMIN — POTASSIUM & SODIUM PHOSPHATES POWDER PACK 280-160-250 MG 1 PACKET: 280-160-250 PACK at 09:47

## 2019-10-28 RX ADMIN — CARBAMAZEPINE 150 MG: 100 SUSPENSION ORAL at 00:43

## 2019-10-28 RX ADMIN — ALBUTEROL SULFATE 2.5 MG: 2.5 SOLUTION RESPIRATORY (INHALATION) at 15:28

## 2019-10-28 RX ADMIN — SCOPALAMINE 1 PATCH: 1 PATCH, EXTENDED RELEASE TRANSDERMAL at 20:19

## 2019-10-28 RX ADMIN — MELATONIN 6 MG: 3 TAB ORAL at 22:09

## 2019-10-28 RX ADMIN — METOCLOPRAMIDE 5 MG: 5 SOLUTION ORAL at 09:48

## 2019-10-28 RX ADMIN — HYDROCORTISONE 20 MG: 20 TABLET ORAL at 09:48

## 2019-10-28 RX ADMIN — Medication 1 PACKET: at 22:09

## 2019-10-28 RX ADMIN — Medication 1 PACKET: at 09:48

## 2019-10-28 RX ADMIN — ALBUTEROL SULFATE 2.5 MG: 2.5 SOLUTION RESPIRATORY (INHALATION) at 23:24

## 2019-10-28 RX ADMIN — LEVOTHYROXINE SODIUM 150 MCG: 150 TABLET ORAL at 06:49

## 2019-10-28 RX ADMIN — ACETYLCYSTEINE 4 ML: 200 SOLUTION ORAL; RESPIRATORY (INHALATION) at 12:37

## 2019-10-28 RX ADMIN — ALBUTEROL SULFATE 2.5 MG: 2.5 SOLUTION RESPIRATORY (INHALATION) at 12:40

## 2019-10-28 RX ADMIN — PANTOPRAZOLE SODIUM 40 MG: 40 TABLET, DELAYED RELEASE ORAL at 09:48

## 2019-10-28 RX ADMIN — CARBAMAZEPINE 150 MG: 100 SUSPENSION ORAL at 06:44

## 2019-10-28 RX ADMIN — CARBAMAZEPINE 150 MG: 100 SUSPENSION ORAL at 19:19

## 2019-10-28 RX ADMIN — ALBUTEROL SULFATE 2.5 MG: 2.5 SOLUTION RESPIRATORY (INHALATION) at 08:14

## 2019-10-28 RX ADMIN — HYDROCORTISONE 10 MG: 10 TABLET ORAL at 18:10

## 2019-10-28 RX ADMIN — BRIVARACETAM 100 MG: 10 SOLUTION ORAL at 22:38

## 2019-10-28 RX ADMIN — Medication 1 PACKET: at 10:49

## 2019-10-28 RX ADMIN — ALBUTEROL SULFATE 2.5 MG: 2.5 SOLUTION RESPIRATORY (INHALATION) at 19:16

## 2019-10-28 RX ADMIN — METOCLOPRAMIDE 5 MG: 5 SOLUTION ORAL at 22:09

## 2019-10-28 NOTE — PROGRESS NOTES
[facility] has been called concerning Pulmonary consult placed yesterday.  They will send message to Dr Freedman.

## 2019-10-28 NOTE — PROGRESS NOTES
[facility]    Medicine Progress Note - Hospitalist Service       Date of Admission:  10/25/2019  Assessment & Plan   Keyon Farias is a 59 year old male with severe traumatic brain injury and severe dysphagia on tube feedings who was sent to the emergency room from his care facility due to fever.  He has had 17 hospitalizations here this year alone for fever and presumed aspiration pneumonia/pneumonitis.    Fever, resolved  Resolved lactic acidosis  Resolved vomiting  Recurrent aspiration   The patient had a negative chest x-ray and 1 temperature of 104  F but otherwise is been afebrile.  He has a leukocytosis which is improving.  Procalcitonin was 0.9 yesterday.  No evidence of a urinary tract infection.  Cultures are no growth to date.  Suspect that he did aspirate- he is currently on room air.    Stop antibiotics     Awaiting recommendations from pulmonologist     Seizure disorder   Follows with Dr. Valdes through [facility] of neurology.    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 evening, 20 mg every morning    Continue PTA levothyroxine 150 mg daily    Continue PTA testosterone on discharge     Perianal rash  Consistent with Candida in the setting of urinary incontinence.     Continue miconazole powder     Severe dysphagia status post GJ tube on tube feedings    Continue tube feeding    Diet: NPO for Medical/Clinical Reasons Except for: No Exceptions  Adult Formula Drip Feeding: Continuous Isosource 1.5; Jejunostomy; Goal Rate: 100; mL/hr; From: 7:00 AM; 7:00 PM; Medication - Feeding Tube Flush Frequency: At least 15-30 mL water before and after medication administration and with tube clogging;...    DVT Prophylaxis: Pneumatic Compression Devices  Lerma Catheter: not present  Code Status: Full Code      Disposition Plan   Expected discharge: tomorrow, recommended to prior living arrangement once antibiotic  plan established.  Entered: Edgar Jenkins MD 10/28/2019, 1:58 PM       The patient's care was discussed with the nurse     Edgar Jenkins MD  Hospitalist Service  [facility]    ______________________________________________________________________    Interval History   Unable to obtain     Data reviewed today: I reviewed all medications, new labs and imaging results over the last 24 hours. I personally reviewed no images or EKG's today.    Physical Exam   Vital Signs: Temp: 97.8  F (36.6  C) Temp src: Axillary BP: 112/59 Pulse: 78 Heart Rate: 67 Resp: 14 SpO2: 96 % O2 Device: None (Room air)    Weight: 162 lbs 4.14 oz  Constitutional: Not in any distress  Respiratory: clear to auscultation today   Cardiovascular: regular rate and rhythm, normal S1 and S2, no S3 or S4, and no murmur noted; no lower extremity edema  GI: normal bowel sounds, soft, non-distended, PEG site intact  Skin: erythema and scaling on buttocks- no examined   Neuropsychiatric: General: normal, calm; awake and following simple commands    Data   Recent Labs   Lab 10/28/19  0746 10/27/19  0856 10/26/19  0837 10/25/19  2000   WBC  --  7.4 11.9* 16.2*   HGB  --  10.4* 9.8* 11.3*   MCV  --  85 84 85   PLT  --  210 181 222     --  139 135   POTASSIUM 3.6  --  3.8 3.9   CHLORIDE 103  --  109 99   CO2 27  --  27 31   BUN 15  --  14 19   CR 0.81  --  0.85 0.79   ANIONGAP 4  --  3 5   STEVE 8.4*  --  7.1* 8.6   GLC 83  --  85 90   ALBUMIN  --   --   --  3.0*   PROTTOTAL  --   --   --  7.9   BILITOTAL  --   --   --  0.2   ALKPHOS  --   --   --  80   ALT  --   --   --  31   AST  --   --   --  22   LIPASE  --   --   --  262     No results found for this or any previous visit (from the past 24 hour(s)).  Medications     IV fluid REPLACEMENT ONLY         acetylcysteine  2 mL Nebulization Q6H     albuterol  2.5 mg Nebulization Q4H While awake     Brivaracetam  100 mg Oral or Feeding Tube BID     carBAMazepine  150 mg Oral or  Feeding Tube Q6H     fiber modular (NUTRISOURCE FIBER)  1 packet Per Feeding Tube Daily     hydrocortisone  10 mg Per G Tube Daily with supper     hydrocortisone  20 mg Per G Tube QAM     levothyroxine  150 mcg Oral or Feeding Tube QAM     melatonin  6 mg Per NG tube At Bedtime     metoclopramide  5 mg Per Feeding Tube BID     pantoprazole  40 mg Per J Tube Daily     potassium & sodium phosphates  1 packet Oral or Feeding Tube BID     protein modular  1 packet Per Feeding Tube BID     Scopolamine HBr  0.4 mg Oral or Feeding Tube TID

## 2019-10-28 NOTE — PLAN OF CARE
DATE & TIME: 10/2819 [id number]                     Cognitive Concerns/ Orientation : Non-verbal, HECTOR orientation  BEHAVIOR & AGGRESSION TOOL COLOR: Green,  CIWA SCORE: n/a     ABNL VS/O2: VSS on RA  MOBILITY: Assist-2, lift; turn/repo every 2 hours  PAIN MANAGMENT: Non-verbal indicators of pain absent  DIET: NPO Tube feeding started at 0800  BOWEL/BLADDER: incontinent of bowel and bladder  ABNL LAB/BG: Ca 8.4   DRAIN/DEVICES: PIV SL  TELEMETRY RHYTHM: n/a  SKIN: coccyx redness/excoriation, non-blanchable, LEGAL GUARDIAN requests avoid barrier cream, rash on hip  TESTS/PROCEDURES:   D/C DAY/GOALS/PLACE: pending progress, antibiotic administration  OTHER IMPORTANT INFO: Contact precautions for MRSA and VRE, pulmonology following

## 2019-10-28 NOTE — PLAN OF CARE
DATE & TIME: 10/27/2019 [id number]                        Cognitive Concerns/ Orientation : Alert- HECTOR orientation, nonverbal   BEHAVIOR & AGGRESSION TOOL COLOR: Green  CIWA SCORE: NA       ABNL VS/O2: VSS on RA, afebrile  MOBILITY: Ax2, total cares, T/R Q2h, up w/ lift  PAIN MANAGMENT: FLACC 0  DIET: NPO, feeding tube 7am-7pm @ 100cc/hr w 60cc flush Q4h  BOWEL/BLADDER: incontinent of B/B,   ABNL LAB/BG: CRP 87.3  DRAIN/DEVICES: G tube, PIV R arm SL, condom catheter In place- prevention of skin breakdown.  TELEMETRY RHYTHM: NA  SKIN: coccyx redness/excoriation, nonblanchable- micro guard applied, AVOID BARRIER CREAM (per pt's mom request). Rash on hip- antifungal applied  TESTS/PROCEDURES: NA- possibility of tracheostomy placement   D/C DAY/GOALS/PLACE: pending progress, ABX administration, expected 2-3 days  OTHER IMPORTANT INFO: contact for MRSA and VRE, infrequent congested;non productive cough,pulmonology following.

## 2019-10-28 NOTE — PLAN OF CARE
DATE & TIME: 10/27/19 night    Cognitive Concerns/ Orientation : Non-verbal, HECTOR orientation  BEHAVIOR & AGGRESSION TOOL COLOR: green  CIWA SCORE: n/a   ABNL VS/O2: VSS, room air  MOBILITY: Assist-2, lift; turn/repo every 2 hours  PAIN MANAGMENT: Non-verbal indicators of pain absent  DIET: tube feeding [id number], clamped  BOWEL/BLADDER: incontinent of bowel and bladder; condom catheter removed  ABNL LAB/BG: Hgb 10.4, hematocrit 32.2, CRP 87.3,   DRAIN/DEVICES: R PIV saline locked  TELEMETRY RHYTHM: n/a  SKIN: coccyx redness/excoriation, non-blanchable, LEGAL GUARDIAN requests avoid barrier cream, rash on hip  TESTS/PROCEDURES: possibility for tracheostomy placement  D/C DAY/GOALS/PLACE: pending progress, antibiotic administration  OTHER IMPORTANT INFO: Contact precautions for MRSA and VRE, pulmonology following

## 2019-10-29 VITALS
BODY MASS INDEX: 22.01 KG/M2 | TEMPERATURE: 97.8 F | RESPIRATION RATE: 14 BRPM | SYSTOLIC BLOOD PRESSURE: 98 MMHG | WEIGHT: 162.26 LBS | HEART RATE: 79 BPM | DIASTOLIC BLOOD PRESSURE: 57 MMHG | OXYGEN SATURATION: 94 %

## 2019-10-29 PROCEDURE — 94640 AIRWAY INHALATION TREATMENT: CPT | Mod: 76

## 2019-10-29 PROCEDURE — 99217 ZZC OBSERVATION CARE DISCHARGE: CPT | Performed by: HOSPITALIST

## 2019-10-29 PROCEDURE — 25000132 ZZH RX MED GY IP 250 OP 250 PS 637: Mod: GY | Performed by: HOSPITALIST

## 2019-10-29 PROCEDURE — 99207 ZZC CDG-CODE CATEGORY CHANGED: CPT | Performed by: HOSPITALIST

## 2019-10-29 PROCEDURE — G0378 HOSPITAL OBSERVATION PER HR: HCPCS

## 2019-10-29 PROCEDURE — 25000125 ZZHC RX 250: Performed by: HOSPITALIST

## 2019-10-29 PROCEDURE — 25000132 ZZH RX MED GY IP 250 OP 250 PS 637: Mod: GY

## 2019-10-29 PROCEDURE — 40000275 ZZH STATISTIC RCP TIME EA 10 MIN

## 2019-10-29 RX ORDER — SCOLOPAMINE TRANSDERMAL SYSTEM 1 MG/1
1 PATCH, EXTENDED RELEASE TRANSDERMAL
Qty: 10 PATCH | Refills: 0 | Status: SHIPPED | OUTPATIENT
Start: 2019-10-31 | End: 2019-12-16

## 2019-10-29 RX ADMIN — MICONAZOLE NITRATE: 20 POWDER TOPICAL at 01:33

## 2019-10-29 RX ADMIN — CARBAMAZEPINE 150 MG: 100 SUSPENSION ORAL at 01:35

## 2019-10-29 RX ADMIN — METOCLOPRAMIDE 5 MG: 5 SOLUTION ORAL at 08:14

## 2019-10-29 RX ADMIN — CARBAMAZEPINE 150 MG: 100 SUSPENSION ORAL at 06:45

## 2019-10-29 RX ADMIN — BRIVARACETAM 100 MG: 10 SOLUTION ORAL at 09:11

## 2019-10-29 RX ADMIN — ACETYLCYSTEINE 2 ML: 200 SOLUTION ORAL; RESPIRATORY (INHALATION) at 08:05

## 2019-10-29 RX ADMIN — ACETYLCYSTEINE 2 ML: 200 SOLUTION ORAL; RESPIRATORY (INHALATION) at 02:26

## 2019-10-29 RX ADMIN — IPRATROPIUM BROMIDE AND ALBUTEROL SULFATE 3 ML: .5; 3 SOLUTION RESPIRATORY (INHALATION) at 02:26

## 2019-10-29 RX ADMIN — LEVOTHYROXINE SODIUM 150 MCG: 150 TABLET ORAL at 06:47

## 2019-10-29 RX ADMIN — POTASSIUM & SODIUM PHOSPHATES POWDER PACK 280-160-250 MG 1 PACKET: 280-160-250 PACK at 08:14

## 2019-10-29 RX ADMIN — ALBUTEROL SULFATE 2.5 MG: 2.5 SOLUTION RESPIRATORY (INHALATION) at 08:05

## 2019-10-29 RX ADMIN — Medication 1 PACKET: at 08:14

## 2019-10-29 RX ADMIN — HYDROCORTISONE 20 MG: 20 TABLET ORAL at 08:14

## 2019-10-29 RX ADMIN — PANTOPRAZOLE SODIUM 40 MG: 40 TABLET, DELAYED RELEASE ORAL at 08:14

## 2019-10-29 NOTE — PLAN OF CARE
DATE & TIME: 10/2819 [id number]                Cognitive Concerns/ Orientation : Non-verbal, HECTOR orientation  BEHAVIOR & AGGRESSION TOOL COLOR: Green  CIWA SCORE: N/A     ABNL VS/O2: VSS on RA  MOBILITY: Assist-2, lift; turn/repo every 2 hours  PAIN MANAGMENT: Non-verbal indicators of pain absent  DIET: NPO   BOWEL/BLADDER: incontinent of bowel and bladder.  ABNL LAB/BG: Ca 8.4   DRAIN/DEVICES: PIV SL, J-tube feeding stopped at 2000.  TELEMETRY RHYTHM: N/A  SKIN: coccyx redness/excoriation, non-blanchable, LEGAL GUARDIAN requests avoid barrier cream, rash on hip - use micro guard powder.  TESTS/PROCEDURES: N/A  D/C DAY/GOALS/PLACE: pending tomorrow, antibiotic administration  OTHER IMPORTANT INFO: Contact precautions for MRSA and VRE, pulmonology consulted tonight, changed scopolamine to patch vs powder.

## 2019-10-29 NOTE — PLAN OF CARE
DATE & TIME: 10/29/19 Night shift                 Cognitive Concerns/ Orientation : alert and oriented. Non-verbal, does use L hand gestures and moans to communicate.  BEHAVIOR & AGGRESSION TOOL COLOR: Green  CIWA SCORE: n/a     ABNL VS/O2: VSS on RA  MOBILITY: Assist-2, lift; turn/repo every 2 hours  PAIN MANAGMENT: Non-verbal indicators of pain absent  DIET: NPO Tube feeding to start at 0800 this AM  BOWEL/BLADDER: incontinent of bowel and bladder, CONDOM catheter in place per GUARDIANS wishes.   ABNL LAB/BG: NA  DRAIN/DEVICES: PIV SL L arm  TELEMETRY RHYTHM: n/a  SKIN: coccyx redness/excoriation, non-blanchable, LEGAL GUARDIAN requests avoid barrier cream, rash on hip. Apply micro guard at every reposition.  TESTS/PROCEDURES: NA  D/C DAY/GOALS/PLACE: Pulmonology consulted and signed off. Potential discharge to home today/tomorrow.   OTHER IMPORTANT INFO: Contact precautions for MRSA and VRE. Slept well.

## 2019-10-29 NOTE — PROGRESS NOTES
Patient on RA. LS coarse. SPO2@ 90's. All nebulizer given as ordered. RT will continue to monitor.

## 2019-10-29 NOTE — PROGRESS NOTES
Discharge    Patient discharged to Home via [facility] with [facility] transport  Care plan note Nonverbal. NPO, TF at 45ml/hr. Total care. Contact ISO    Listed belongings gathered and returned to patient. Yes  Care Plan and Patient education resolved: Yes  Prescriptions if needed, hard copies sent with patient  NA  Home and hospital acquired medications returned to patient: Yes  Medication Bin checked and emptied on discharge Yes  Follow up appointment made for patient: Yes

## 2019-10-29 NOTE — CONSULTS
Pulmonary Medicine Consultation      Date of Admission: 10/25/2019  Primary Attending:  Keeley Claros*  Consulting Physician: Israel Freedman MD    History:    Keyon is a 59M, nonverbal, s/p TBI and severe dysphagia with feeding tube and nocturnal J-tube feeds, strict npo and recurrent (ie>10) hospital encounters in the last 12 months for aspiration pneumonia / pneumonitis. He lives at home with his mother who provides cares as well as caretakers. No history available from pt. Admitted with fevers and unremarkable chest imaging. Back to room air. Dispo planning ongoing. Pulmonary consulted regarding options for recurrent hospitalizations.      Review of Systems - A 10-system ROS is negative except for items mentioned above and in HPI.       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      Pneumonia      Seizures (H)     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     Septic shock (H)      Spastic hemiplegia affecting dominant side (H)     related to wil injury     Thyroid disease      Tracheostomy care (H)      Traumatic brain injury (H) 1989     Unspecified cerebral artery occlusion with cerebral infarction 1989     UTI (urinary tract infection)      Ventricular fibrillation (H)      Ventricular tachyarrhythmia (H)        Past Surgical History:   Procedure Laterality Date     ENDOSCOPIC ULTRASOUND UPPER GASTROINTESTINAL TRACT (GI) N/A 1/30/2017    Procedure: ENDOSCOPIC ULTRASOUND, ESOPHAGOSCOPY / UPPER GASTROINTESTINAL TRACT (GI);  Surgeon: Jus Montana MD;  Location: [facility] OR     ENDOSCOPIC ULTRASOUND, ESOPHAGOSCOPY, GASTROSCOPY,  DUODENOSCOPY (EGD), NECROSECTOMY N/A 2/7/2017    Procedure: ENDOSCOPIC ULTRASOUND, ESOPHAGOSCOPY, GASTROSCOPY, DUODENOSCOPY (EGD), NECROSECTOMY;  Surgeon: Jack Marcus MD;  Location:  OR     ESOPHAGOSCOPY, GASTROSCOPY, DUODENOSCOPY (EGD), COMBINED  3/13/2014    Procedure: COMBINED ESOPHAGOSCOPY, GASTROSCOPY, DUODENOSCOPY (EGD), BIOPSY SINGLE OR MULTIPLE;  gastroscopy;  Surgeon: Digna Rhodes MD;  Location: [facility]     ESOPHAGOSCOPY, GASTROSCOPY, DUODENOSCOPY (EGD), COMBINED N/A 12/6/2016    Procedure: COMBINED ESOPHAGOSCOPY, GASTROSCOPY, DUODENOSCOPY (EGD);  Surgeon: Digna Rhodes MD;  Location: [facility]     ESOPHAGOSCOPY, GASTROSCOPY, DUODENOSCOPY (EGD), COMBINED N/A 2/7/2017    Procedure: COMBINED ENDOSCOPIC ULTRASOUND, ESOPHAGOSCOPY, GASTROSCOPY, DUODENOSCOPY (EGD), FINE NEEDLE ASPIRATE/BIOPSY;  Surgeon: Too Thakur MD;  Location:  OR     HEAD & NECK SURGERY      reconstructive facial surgery following accident in 1989     IR FOLLOW UP VISIT INPATIENT  2/20/2019     IR GASTRO JEJUNOSTOMY TUBE CHANGE  12/20/2018     IR GASTRO JEJUNOSTOMY TUBE CHANGE  2/4/2019     IR GASTRO JEJUNOSTOMY TUBE CHANGE  3/8/2019     IR GASTRO JEJUNOSTOMY TUBE CHANGE  8/7/2019     IR PICC EXCHANGE LEFT  8/15/2019     LAPAROSCOPIC APPENDECTOMY  7/30/2013    Procedure: LAPAROSCOPIC APPENDECTOMY;  LAPAROSCOPIC APPENDECTOMY;  Surgeon: Manish Pierce MD;  Location:  OR     LAPAROSCOPIC ASSISTED INSERTION TUBE GASTROTOMY N/A 9/7/2016    Procedure: LAPAROSCOPIC ASSISTED INSERTION TUBE GASTROSTOMY;  Surgeon: Manish Pierce MD;  Location:  OR     ORTHOPEDIC SURGERY      right hand repair     TRACHEOSTOMY N/A 9/3/2016    Procedure: TRACHEOSTOMY;  Surgeon: João Ortiz MD;  Location:  OR     TRACHEOSTOMY N/A 12/2/2016    Procedure: TRACHEOSTOMY;  Surgeon: João Ortiz MD;  Location:  OR     VASCULAR SURGERY         Patient Active Problem List   Diagnosis      Appendicitis     Panhypopituitarism (H)     Hematemesis     Seizure (H)     Seizures (H)     Recurrent seizures (H)     Sepsis (H)     Septic shock (H)     Pneumonia of both lower lobes due to infectious organism (H)     Community acquired pneumonia     Breakthrough seizure (H)     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)     Hyponatremia     PEG tube malfunction (H)     Pneumonia     Cardiac arrest (H)     Acute respiratory failure with hypoxia (H)     Necrotizing pancreatitis     Pneumonia, aspiration (H)     Encephalopathy     Fever     Aspiration pneumonia (H)     Sepsis due to pneumonia (H)     Severe sepsis (H)     Acid reflux     SIRS (systemic inflammatory response syndrome) (H)     Aspiration, chronic pulmonary, initial encounter       Social History     Social History     Socioeconomic History     Marital status: Single     Spouse name: Not on file     Number of children: Not on file     Years of education: Not on file     Highest education level: Not on file   Occupational History     Not on file   Social Needs     Financial resource strain: Not on file     Food insecurity:     Worry: Not on file     Inability: Not on file     Transportation needs:     Medical: Not on file     Non-medical: Not on file   Tobacco Use     Smoking status: Former Smoker     Last attempt to quit: 1989     Years since quittin.5     Smokeless tobacco: Never Used   Substance and Sexual Activity     Alcohol use: No     Drug use: No     Sexual activity: Never   Lifestyle     Physical activity:     Days per week: Not on file     Minutes per session: Not on file     Stress: Not on file   Relationships     Social connections:     Talks on phone: Not on file     Gets together: Not on file     Attends Yazidism service: Not on file     Active member of club or organization: Not on file     Attends meetings of clubs or organizations: Not on file     Relationship status: Not on file     Intimate  partner violence:     Fear of current or ex partner: Not on file     Emotionally abused: Not on file     Physically abused: Not on file     Forced sexual activity: Not on file   Other Topics Concern     Parent/sibling w/ CABG, MI or angioplasty before 65F 55M? Not Asked   Social History Narrative     Not on file         Family History  Family History   Problem Relation Age of Onset     Cancer Father          Medications  No current outpatient medications on file.     Current Facility-Administered Medications Ordered in Epic   Medication Dose Route Frequency Last Rate Last Dose     acetaminophen (TYLENOL) Suppository 650 mg  650 mg Rectal Q4H PRN         acetaminophen (TYLENOL) tablet 650 mg  650 mg Oral or Feeding Tube Q4H PRN         acetylcysteine (MUCOMYST) 20 % nebulizer solution 2 mL  2 mL Nebulization Q6H   4 mL at 10/28/19 1237     albuterol (PROVENTIL) neb solution 2.5 mg  2.5 mg Nebulization Q4H While awake   2.5 mg at 10/28/19 1528     Brivaracetam (BRIVIACT) solution 100 mg  100 mg Oral or Feeding Tube BID   100 mg at 10/28/19 0948     carBAMazepine (TEGretol) suspension 150 mg  150 mg Oral or Feeding Tube Q6H   150 mg at 10/28/19 1155     dextrose 10 % 1,000 mL infusion   Intravenous Continuous PRN         fiber modular (NUTRISOURCE FIBER) packet 1 packet  1 packet Per Feeding Tube Daily   1 packet at 10/28/19 0948     hydrocortisone (CORTEF) tablet 10 mg  10 mg Per G Tube Daily with supper   10 mg at 10/28/19 1810     hydrocortisone (CORTEF) tablet 20 mg  20 mg Per G Tube QAM   20 mg at 10/28/19 0948     hydrocortisone 1 % cream CREA   Rectal BID PRN         ipratropium - albuterol 0.5 mg/2.5 mg/3 mL (DUONEB) neb solution 3 mL  1 vial Nebulization Q4H PRN         levothyroxine (SYNTHROID/LEVOTHROID) tablet 150 mcg  150 mcg Oral or Feeding Tube QAM   150 mcg at 10/28/19 0649     melatonin tablet 6 mg  6 mg Per NG tube At Bedtime   6 mg at 10/27/19 2109     metoclopramide (REGLAN) solution 5 mg  5 mg Per  Feeding Tube BID   5 mg at 10/28/19 0948     miconazole (MICATIN/MICRO GUARD) 2 % powder   Topical BID PRN         naloxone (NARCAN) injection 0.1-0.4 mg  0.1-0.4 mg Intravenous Q2 Min PRN         ondansetron (ZOFRAN-ODT) ODT tab 4 mg  4 mg Oral Q6H PRN        Or     ondansetron (ZOFRAN) injection 4 mg  4 mg Intravenous Q6H PRN         pantoprazole (PROTONIX) 2 mg/mL suspension 40 mg  40 mg Per J Tube Daily   40 mg at 10/28/19 0948     polyethylene glycol (MIRALAX/GLYCOLAX) Packet 17 g  17 g Oral or Feeding Tube Daily PRN         potassium & sodium phosphates (NEUTRA-PHOS) Packet 1 packet  1 packet Oral or Feeding Tube BID   1 packet at 10/28/19 1810     protein modular (PROSOURCE TF) 1 packet  1 packet Per Feeding Tube BID   1 packet at 10/28/19 1049     scopolamine (TRANSDERM) 72 hr patch 1 patch  1 patch Transdermal Q72H        And     scopolamine (TRANSDERM-SCOP) Patch in Place   Transdermal Q8H        And     [START ON 10/31/2019] scopolamine (TRANSDERM-SCOP) patch REMOVAL   Transdermal Q72H         Scopolamine HBr POWD 0.4 mg  0.4 mg Oral or Feeding Tube TID   0.4 mg at 10/28/19 0947     senna-docusate (SENOKOT-S/PERICOLACE) 8.6-50 MG per tablet 1 tablet  1 tablet Oral or Feeding Tube BID PRN        Or     senna-docusate (SENOKOT-S/PERICOLACE) 8.6-50 MG per tablet 2 tablet  2 tablet Oral or Feeding Tube BID PRN         No current Epic-ordered outpatient medications on file.       Allergies   Allergen Reactions     Dilantin [Phenytoin Sodium]      Valproic Acid      Toxicity c bone marrow suspension, elevated ammonia levels          Physical Examination:   Vitals:    10/28/19 0054 10/28/19 0529 10/28/19 0739 10/28/19 1528   BP: 104/50  112/59 118/64   BP Location: Right arm  Right arm Left arm   Pulse:   78    Resp:   14 18   Temp: 97.8  F (36.6  C)  97.8  F (36.6  C) 98.1  F (36.7  C)   TempSrc: Axillary  Axillary Oral   SpO2: 93%  96% 94%   Weight:  73.6 kg (162 lb 4.1 oz)       Body mass index is 22.01  kg/m .  Temp (24hrs), Av.9  F (36.6  C), Min:97.8  F (36.6  C), Max:98.1  F (36.7  C)        Constitutional:  Appears comfortable.  HENT:  mucous membranes moist. Excessive oral secretions   Eyes: no icterus, no pallor.   Neck:  trachea midline, no carotid bruits, old trach scar healed  Cardiovascular: Regular rate and rythym,  Respiratory/Chest: No use of accessory muscle, scatted coarse sounds, clear with weak cough, no wheezing  Gastrointestinal: Abdomen was soft, non-tender, non-distended, no masses felt, no hepatosplenomegaly.  Musculoskeletal: No clubbing or cyanosis,  Neurological: weak  Skin: No skin rash      CMP  Recent Labs   Lab 10/28/19  0746 10/26/19  0837 10/25/19  2000    139 135   POTASSIUM 3.6 3.8 3.9   CHLORIDE 103 109 99   CO2 27 27 31   ANIONGAP 4 3 5   GLC 83 85 90   BUN 15 14 19   CR 0.81 0.85 0.79   GFRESTIMATED >90 >90 >90   GFRESTBLACK >90 >90 >90   STEVE 8.4* 7.1* 8.6   MAG 2.1  --   --    PHOS 2.7  --   --    PROTTOTAL  --   --  7.9   ALBUMIN  --   --  3.0*   BILITOTAL  --   --  0.2   ALKPHOS  --   --  80   AST  --   --  22   ALT  --   --  31     CBC  Recent Labs   Lab 10/27/19  0856 10/26/19  0837 10/25/19  2000   WBC 7.4 11.9* 16.2*   RBC 3.80* 3.60* 4.12*   HGB 10.4* 9.8* 11.3*   HCT 32.2* 30.3* 35.0*   MCV 85 84 85   MCH 27.4 27.2 27.4   MCHC 32.3 32.3 32.3   RDW 14.5 14.5 14.5    181 222     INRNo lab results found in last 7 days.  Arterial Blood GasNo lab results found in last 7 days.  Recent Labs   Lab 10/25/19  2001 10/25/19  1946   CULT No growth after 3 days No growth after 3 days       Diagnostic Studies:  Chest Radiology: cxr - no sig infiltrate, no effusion or ptx        Assessment/Plan:     Keyon is a 59M, nonverbal, s/p TBI and severe dysphagia with feeding tube and nocturnal J-tube feeds, strict npo and recurrent (ie>10) hospital encounters in the last 12 months for aspiration pneumonia / pneumonitis. Admitted with fevers and unremarkable chest imaging. Back  to room air. Suspect ongoing aspiration events with little ability to control his secretions. A scopolamine patch was beneficial but caused rash. Also uses scop in tube feeds with some improvement. Would be reasonable to consider trach although not convinced this would completely eliminate oral secretions and would add to level of care needed. Would defer to outpatient ENT consultation.    -try scopolamine patch again. Ok to rotate position of patch.  - reasonable to consider trial of atropine drops sublingual   -upright positioning at night during tube feeds  -continue j-tube feeds  -outpt pulm follouwup at [facility] where he has established relationship and was seen last month  -agree with plans to discharge tomorrow  -will sign off. Please call if needed      Israel Freedman MD  Pulmonary, Critical Care and Sleep Medicine  [facility]   Pager: [phone]  Office:[phone]

## 2019-10-29 NOTE — CONSULTS
Care Transition Initial Assessment - RN    Conversed with pt's mother and Legal Guardian, Savannah, yesterday.  Pt is not able to communicate.  Have also communicated with her this AM concerning transport time.  She has confirmed the home care nurse will be at there home at 11:30 AM today.  Currently unable to reach [facility] due to phone line is down.        DATA  Active Problems:  Fever  Aspiration, chronic pulmonary       Cognitive Status: Nonverbal, able to five thumbs up   Contact information and PCP information verified: Yes  Lives With: parent(s)      Insurance concerns: No Insurance issues identified  ASSESSMENT  Pt lives with his mother and pt's PCA also lives with them.  Patient currently receives the following services: [facility] (P:[phone]) (Fax:[phone])for RN 12 Hr coverage daily and [facility] (P:[phone]) (Fax:[phone]) for home OT/PT.  He also receives a 12 hr PCA adarsh/night [facility] (currently 80.5 hours every 7 days) and TF with supplies through [facility].     Pt has had 17 admissions this year.  All measures have been put in place to prevent this.      Planning hospital discharge to home at 1030 AM today.  Transportation has been set up through [employer] via stretcher.    Will need orders to resume his home OT and PT.  New meds should be filled here and sent with pt.    PCP Follow-up: Friday 11/1/19 at 2:00PM       Care  (CTS) will continue to follow as needed.    Addendum:Both [facility] and [facility] have been contacted and orders were faxed to both agencies.

## 2019-10-30 NOTE — DISCHARGE SUMMARY
[facility]  Hospitalist Discharge Summary       Date of Admission:  10/25/2019  Date of Discharge:  10/29/2019 10:50 AM  Discharging Provider: Edgar Jenkins MD  Discharge Diagnoses   Aspiration pneumonitis     History of     Traumatic brain injury    Seizure disorder     Panhypopituitarism    Severe dysphagia status post GJ tube on tube feedings    Follow-ups Needed After Discharge   Follow-up Appointments     Follow-up and recommended labs and tests       Follow up with primary care provider, Carlos Gomez, as scheduled for you   on Friday 11/1/19 at 2:00PM, for hospital follow- up.  The following   labs/tests are recommended: BMP, CBC.      Follow up with pulmonologist as scheduled.             Unresulted Labs Ordered in the Past 30 Days of this Admission     Date and Time Order Name Status Description    10/25/2019 1916 Blood culture Preliminary     10/25/2019 1916 Blood culture Preliminary       These results will be followed up by primary care provider     Discharge Disposition   Discharged to home  Condition at discharge: Stable    Hospital Course   Keyon Farias is a 59 year old male with severe traumatic brain injury and severe dysphagia on tube feedings who was sent to the emergency room from his care facility due to fever.  He has had 17 hospitalizations here this year alone for fever and presumed aspiration pneumonia/pneumonitis.    Fever, resolved  Resolved lactic acidosis  Resolved vomiting  Recurrent aspiration   The patient had a negative chest x-ray and 1 temperature of 104  F but otherwise is been afebrile.  He has a leukocytosis which is improving.  Procalcitonin was 0.9 yesterday.  No evidence of a urinary tract infection.  Cultures are no growth to date.  Suspect that he did aspirate- he is currently on room air.  Discharge home with ongoing pulmonary toileting and scopolamine.    Seizure disorder   Follows with Dr. Valdes through [facility] of  neurology.    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 evening, 20 mg every morning    Continue PTA levothyroxine 150 mg daily    Continue PTA testosterone on discharge     Perianal rash  Consistent with Candida in the setting of urinary incontinence.     Continue miconazole powder     Severe dysphagia status post GJ tube on tube feedings    Continue tube feeding    Consultations This Hospital Stay   NUTRITION SERVICES ADULT IP CONSULT  PHARMACY IP CONSULT  PULMONARY IP CONSULT  CARE TRANSITION RN/SW IP CONSULT    Code Status   Prior    Time Spent on this Encounter   I, Edgar Jenkins MD, personally saw the patient today and spent less than or equal to 30 minutes discharging this patient.       Edgar Jenkins MD  [facility]  ______________________________________________________________________    Physical Exam   Vital Signs:                    Weight: 162 lbs 4.14 oz  Constitutional: awake, alert, cooperative, no apparent distress,  Respiratory: No increased work of breathing, good air exchange, clear to auscultation bilaterally, no crackles or wheezing  Neuropsychiatric: General: normal, calm and normal eye contact       Primary Care Physician   Carlos Gomez    Discharge Orders      Home care nursing referral      Home Care PT Referral for Hospital Discharge      Home Care OT Referral for Hospital Discharge      Reason for your hospital stay    Aspiration     Follow-up and recommended labs and tests     Follow up with primary care provider, Carlos Gomez, as scheduled for you on Friday 11/1/19 at 2:00PM, for hospital follow- up.  The following labs/tests are recommended: BMP, CBC.      Follow up with pulmonologist as scheduled.     Activity    Your activity upon discharge: activity as tolerated     Diet    Npo- tube feedings       Significant Results and Procedures   Most Recent 3 CBC's:  Recent Labs   Lab  Test 10/27/19  0856 10/26/19  0837 10/25/19  2000   WBC 7.4 11.9* 16.2*   HGB 10.4* 9.8* 11.3*   MCV 85 84 85    181 222   ,   Results for orders placed or performed during the hospital encounter of 10/25/19   XR Chest 2 Views    Narrative    CHEST TWO VIEWS 10/25/2019 8:54 PM     HISTORY: Fever. Vomiting. History of aspiration.    COMPARISON: None.    FINDINGS: Heart size and interstitial markings are exaggerated by low  lung volumes. No gross airspace consolidation, pneumothorax, or  pleural effusion.       Impression    IMPRESSION: No radiographic evidence of acute chest abnormality.     ANNETTE CASTRO MD       Discharge Medications   Discharge Medication List as of 10/29/2019  9:29 AM      START taking these medications    Details   scopolamine (TRANSDERM) 1 MG/3DAYS 72 hr patch Place 1 patch onto the skin every 72 hours, Disp-10 patch, R-0, E-Prescribe         CONTINUE these medications which have NOT CHANGED    Details   acetylcysteine (MUCOMYST) 20 % neb solution Take 2 mLs by nebulization every 6 hours With albuterol at 0700, 1100, 1500, and 1900, Historical      albuterol (PROVENTIL) (5 MG/ML) 0.5% neb solution Take 2.5 mg by nebulization every 4 hours (while awake) 0700 1100 1500 1900 with mucomyst , Historical      aspirin (ASA) 81 MG chewable tablet 81 mg by Oral or Feeding Tube route daily At 0900, Historical      bacitracin ointment Apply topically daily as needed for wound care To PEG site. Historical      Bioflavonoid Products (VITAMIN C PLUS) 1000 MG TABS 1 tablet by Oral or FT or NG tube route, Historical      Brivaracetam (BRIVIACT) 10 MG/ML solution 100 mg by Oral or Feeding Tube route 2 times daily 0900, 2100, Historical      calcium carbonate 1250 (500 CA) MG/5ML SUSP suspension 5 mLs (1,250 mg) by Per J Tube route 3 times daily (with meals), Disp-450 mL, Transitional      carBAMazepine (TEGRETOL) 100 MG/5ML suspension 150 mg by Oral or Feeding Tube route every 6 hours At 06:00, 12:00,  18:00 and 24:00 for seizures , Historical      ferrous sulfate 220 (44 Fe) MG/5ML ELIX 220 mg by Per Feeding Tube route daily, Historical      Guar Gum (FIBER MODULAR, NUTRISOURCE FIBER,) packet 1 packet by Per G Tube route daily, Disp-30 packet, R-0, E-Prescribe      !! hydrocortisone (CORTEF) 5 MG tablet 10 mg by Oral or FT or NG tube route daily (with dinner) At 1500, Historical      !! hydrocortisone (CORTEF) 5 MG tablet 20 mg by Oral or FT or NG tube route every morning , Historical      hydrocortisone 1 % CREA cream Place rectally 2 times daily as needed for other Apply to reddened memo areas as neededHistorical      ipratropium - albuterol 0.5 mg/2.5 mg/3 mL (DUONEB) 0.5-2.5 (3) MG/3ML neb solution Take 1 vial by nebulization every 4 hours as needed (bronchospasms or cough), Historical      levothyroxine (SYNTHROID/LEVOTHROID) 150 MCG tablet Take 150 mcg by mouth every morning, Historical      melatonin (MELATONIN) 1 MG/ML LIQD liquid 6 mg by Per NG tube route At Bedtime , Historical      metoclopramide (REGLAN) 5 MG/5ML solution 5 mg by Per Feeding Tube route 2 times daily, Historical      miconazole (MICATIN) 2 % AERP powder Apply topically 2 times daily as needed Historical      mupirocin (BACTROBAN) 2 % external ointment Apply topically 2 times daily as needed Historical      order for DME Equipment being ordered: NebulizerDisp-1 Units, R-0, Local Print      pantoprazole (PROTONIX) 2 mg/mL SUSP suspension 20 mLs (40 mg) by Per J Tube route daily, Disp-400 mL, R-1, E-Prescribe      potassium & sodium phosphates (NEUTRA-PHOS) 280-160-250 MG Packet Take 1 packet by mouth 2 times daily , Historical      Skin Protectants, Misc. (BALMEX SKIN PROTECTANT) OINT Externally apply topically 2 times daily as needed (irritation) Applay to reddened memo areas twice daily as neededHistorical      testosterone cypionate (DEPOTESTOTERONE CYPIONATE) 200 MG/ML injection Inject 76 mg into the muscle See Admin Instructions  Every 2 weeks on Fridays   76 mg or 0.38 mL, Historical      vitamin D3 (CHOLECALCIFEROL) 2000 units (50 mcg) tablet Take 2,000 Units by mouth daily Crush and feed via j-tube @@ 0900, Historical       !! - Potential duplicate medications found. Please discuss with provider.      STOP taking these medications       Scopolamine HBr POWD Comments:   Reason for Stopping:             Allergies   Allergies   Allergen Reactions     Dilantin [Phenytoin Sodium]      Valproic Acid      Toxicity c bone marrow suspension, elevated ammonia levels

## 2019-11-01 ENCOUNTER — TELEPHONE (OUTPATIENT)
Dept: PULMONOLOGY | Facility: CLINIC | Age: 59
End: 2019-11-01

## 2019-11-01 RX ORDER — SCOPOLAMINE HYDROBROMIDE
POWDER (GRAM) MISCELLANEOUS
Qty: 1 BOTTLE | Refills: 11 | COMMUNITY
Start: 2019-11-01 | End: 2019-12-16

## 2019-11-01 NOTE — TELEPHONE ENCOUNTER
Spoke with patients caregiver Seun who needed clarification on scoplamine script for patient. When pt was recently in hospital was told to D/C G tube dosing and to return to patches. Pt does not tolerate patches (welts per nurse and pt pulls them off). They would like to return to Scoplamine caps through G tube.       Seun RN thought that script scoplamine was for  0.8mg at 8am, 0.8mg at noon and 0.4 at midnight in G-tube.  and pt was tolerating and doing well.     Would you please advise if ok to stop patch and return to caps and if dosing above is ok.     Thank you.     NANCI Davison BSN

## 2019-11-01 NOTE — TELEPHONE ENCOUNTER
Message from MD:    I reviewed the Pulmonary consult note from his recent hospitalization. It does not seem like there was a clear reason they suggested going back to the patch. The reason we suggested G tube dosing in clinic was that Seun and Keyon's mom did not want to ever use a patch given the skin irritation. If Seun thinks Keyon was doing well on the G-tube regimen of 0.8/0.8/0.4 then I would go back to that.     Thanks  Jennie

## 2019-11-01 NOTE — TELEPHONE ENCOUNTER
RN spoke with Nurse Kohli and updated. RN also called in [place] speciality with updated script.   Lakia Barton RN BSN

## 2019-11-10 ENCOUNTER — HOSPITAL ENCOUNTER (OUTPATIENT)
Facility: CLINIC | Age: 59
Setting detail: OBSERVATION
Discharge: NURSING, REHAB OR LONG-TERM CARE | End: 2019-11-11
Attending: EMERGENCY MEDICINE | Admitting: INTERNAL MEDICINE
Payer: MEDICARE

## 2019-11-10 DIAGNOSIS — E87.1 HYPONATREMIA: ICD-10-CM

## 2019-11-10 DIAGNOSIS — R56.9 SEIZURES (H): ICD-10-CM

## 2019-11-10 DIAGNOSIS — J69.0 ASPIRATION PNEUMONITIS (H): Primary | ICD-10-CM

## 2019-11-10 DIAGNOSIS — R50.9 FEVER, UNSPECIFIED FEVER CAUSE: ICD-10-CM

## 2019-11-10 LAB
ALBUMIN SERPL-MCNC: 3.1 G/DL (ref 3.4–5)
ALP SERPL-CCNC: 93 U/L (ref 40–150)
ALT SERPL W P-5'-P-CCNC: 25 U/L (ref 0–70)
ANION GAP SERPL CALCULATED.3IONS-SCNC: 6 MMOL/L (ref 3–14)
AST SERPL W P-5'-P-CCNC: 25 U/L (ref 0–45)
BASOPHILS # BLD AUTO: 0.1 10E9/L (ref 0–0.2)
BASOPHILS NFR BLD AUTO: 0.5 %
BILIRUB SERPL-MCNC: 0.3 MG/DL (ref 0.2–1.3)
BUN SERPL-MCNC: 15 MG/DL (ref 7–30)
CALCIUM SERPL-MCNC: 8.4 MG/DL (ref 8.5–10.1)
CHLORIDE SERPL-SCNC: 92 MMOL/L (ref 94–109)
CO2 BLDCOV-SCNC: 24 MMOL/L (ref 21–28)
CO2 SERPL-SCNC: 26 MMOL/L (ref 20–32)
CREAT SERPL-MCNC: 0.73 MG/DL (ref 0.66–1.25)
DIFFERENTIAL METHOD BLD: ABNORMAL
EOSINOPHIL # BLD AUTO: 0.4 10E9/L (ref 0–0.7)
EOSINOPHIL NFR BLD AUTO: 2.6 %
ERYTHROCYTE [DISTWIDTH] IN BLOOD BY AUTOMATED COUNT: 14.3 % (ref 10–15)
FLUAV+FLUBV AG SPEC QL: NEGATIVE
FLUAV+FLUBV AG SPEC QL: NEGATIVE
GFR SERPL CREATININE-BSD FRML MDRD: >90 ML/MIN/{1.73_M2}
GLUCOSE SERPL-MCNC: 97 MG/DL (ref 70–99)
HCT VFR BLD AUTO: 34 % (ref 40–53)
HGB BLD-MCNC: 11.2 G/DL (ref 13.3–17.7)
IMM GRANULOCYTES # BLD: 0 10E9/L (ref 0–0.4)
IMM GRANULOCYTES NFR BLD: 0.2 %
LACTATE BLD-SCNC: 1.7 MMOL/L (ref 0.7–2.1)
LYMPHOCYTES # BLD AUTO: 2 10E9/L (ref 0.8–5.3)
LYMPHOCYTES NFR BLD AUTO: 14.1 %
MCH RBC QN AUTO: 26.3 PG (ref 26.5–33)
MCHC RBC AUTO-ENTMCNC: 32.9 G/DL (ref 31.5–36.5)
MCV RBC AUTO: 80 FL (ref 78–100)
MONOCYTES # BLD AUTO: 1.1 10E9/L (ref 0–1.3)
MONOCYTES NFR BLD AUTO: 7.9 %
NEUTROPHILS # BLD AUTO: 10.3 10E9/L (ref 1.6–8.3)
NEUTROPHILS NFR BLD AUTO: 74.7 %
NRBC # BLD AUTO: 0 10*3/UL
NRBC BLD AUTO-RTO: 0 /100
PCO2 BLDV: 34 MM HG (ref 40–50)
PH BLDV: 7.45 PH (ref 7.32–7.43)
PLATELET # BLD AUTO: 191 10E9/L (ref 150–450)
PO2 BLDV: 44 MM HG (ref 25–47)
POTASSIUM SERPL-SCNC: 4.3 MMOL/L (ref 3.4–5.3)
PROCALCITONIN SERPL-MCNC: <0.05 NG/ML
PROT SERPL-MCNC: 7.5 G/DL (ref 6.8–8.8)
RBC # BLD AUTO: 4.26 10E12/L (ref 4.4–5.9)
SAO2 % BLDV FROM PO2: 82 %
SODIUM SERPL-SCNC: 124 MMOL/L (ref 133–144)
SPECIMEN SOURCE: NORMAL
WBC # BLD AUTO: 13.8 10E9/L (ref 4–11)

## 2019-11-10 PROCEDURE — 87804 INFLUENZA ASSAY W/OPTIC: CPT | Performed by: EMERGENCY MEDICINE

## 2019-11-10 PROCEDURE — 84145 PROCALCITONIN (PCT): CPT | Performed by: EMERGENCY MEDICINE

## 2019-11-10 PROCEDURE — 80053 COMPREHEN METABOLIC PANEL: CPT | Performed by: EMERGENCY MEDICINE

## 2019-11-10 PROCEDURE — 85025 COMPLETE CBC W/AUTO DIFF WBC: CPT | Performed by: EMERGENCY MEDICINE

## 2019-11-10 PROCEDURE — 27210429 ZZH NUTRITION PRODUCT INTERMEDIATE LITER

## 2019-11-10 PROCEDURE — 99285 EMERGENCY DEPT VISIT HI MDM: CPT | Mod: 25

## 2019-11-10 PROCEDURE — 83605 ASSAY OF LACTIC ACID: CPT

## 2019-11-10 PROCEDURE — 96361 HYDRATE IV INFUSION ADD-ON: CPT

## 2019-11-10 PROCEDURE — 87040 BLOOD CULTURE FOR BACTERIA: CPT | Performed by: EMERGENCY MEDICINE

## 2019-11-10 PROCEDURE — 25800030 ZZH RX IP 258 OP 636: Performed by: EMERGENCY MEDICINE

## 2019-11-10 PROCEDURE — 96360 HYDRATION IV INFUSION INIT: CPT

## 2019-11-10 PROCEDURE — 82803 BLOOD GASES ANY COMBINATION: CPT

## 2019-11-10 RX ADMIN — SODIUM CHLORIDE 2000 ML: 9 INJECTION, SOLUTION INTRAVENOUS at 22:57

## 2019-11-10 ASSESSMENT — MIFFLIN-ST. JEOR: SCORE: 1602.36

## 2019-11-11 ENCOUNTER — APPOINTMENT (OUTPATIENT)
Dept: GENERAL RADIOLOGY | Facility: CLINIC | Age: 59
End: 2019-11-11
Attending: EMERGENCY MEDICINE
Payer: MEDICARE

## 2019-11-11 VITALS
HEIGHT: 72 IN | DIASTOLIC BLOOD PRESSURE: 59 MMHG | BODY MASS INDEX: 21.78 KG/M2 | SYSTOLIC BLOOD PRESSURE: 114 MMHG | WEIGHT: 160.8 LBS | HEART RATE: 91 BPM | OXYGEN SATURATION: 95 % | RESPIRATION RATE: 16 BRPM | TEMPERATURE: 96.2 F

## 2019-11-11 LAB
OSMOLALITY SERPL: 264 MMOL/KG (ref 275–295)
OSMOLALITY UR: 283 MMOL/KG (ref 100–1200)
SODIUM SERPL-SCNC: 124 MMOL/L (ref 133–144)
SODIUM SERPL-SCNC: 128 MMOL/L (ref 133–144)
SODIUM SERPL-SCNC: 129 MMOL/L (ref 133–144)
SODIUM UR-SCNC: 82 MMOL/L

## 2019-11-11 PROCEDURE — 40000275 ZZH STATISTIC RCP TIME EA 10 MIN

## 2019-11-11 PROCEDURE — 25800030 ZZH RX IP 258 OP 636: Performed by: STUDENT IN AN ORGANIZED HEALTH CARE EDUCATION/TRAINING PROGRAM

## 2019-11-11 PROCEDURE — 99236 HOSP IP/OBS SAME DATE HI 85: CPT | Performed by: INTERNAL MEDICINE

## 2019-11-11 PROCEDURE — 84295 ASSAY OF SERUM SODIUM: CPT | Performed by: STUDENT IN AN ORGANIZED HEALTH CARE EDUCATION/TRAINING PROGRAM

## 2019-11-11 PROCEDURE — 94640 AIRWAY INHALATION TREATMENT: CPT | Mod: 76

## 2019-11-11 PROCEDURE — 71046 X-RAY EXAM CHEST 2 VIEWS: CPT

## 2019-11-11 PROCEDURE — 99207 ZZC CDG-CODE CATEGORY CHANGED: CPT | Performed by: INTERNAL MEDICINE

## 2019-11-11 PROCEDURE — 25800030 ZZH RX IP 258 OP 636: Performed by: INTERNAL MEDICINE

## 2019-11-11 PROCEDURE — 83930 ASSAY OF BLOOD OSMOLALITY: CPT | Performed by: STUDENT IN AN ORGANIZED HEALTH CARE EDUCATION/TRAINING PROGRAM

## 2019-11-11 PROCEDURE — 99207 ZZC APP CREDIT; MD BILLING SHARED VISIT: CPT | Performed by: STUDENT IN AN ORGANIZED HEALTH CARE EDUCATION/TRAINING PROGRAM

## 2019-11-11 PROCEDURE — 36415 COLL VENOUS BLD VENIPUNCTURE: CPT | Performed by: STUDENT IN AN ORGANIZED HEALTH CARE EDUCATION/TRAINING PROGRAM

## 2019-11-11 PROCEDURE — 83935 ASSAY OF URINE OSMOLALITY: CPT | Performed by: STUDENT IN AN ORGANIZED HEALTH CARE EDUCATION/TRAINING PROGRAM

## 2019-11-11 PROCEDURE — G0463 HOSPITAL OUTPT CLINIC VISIT: HCPCS | Performed by: NURSE PRACTITIONER

## 2019-11-11 PROCEDURE — 36415 COLL VENOUS BLD VENIPUNCTURE: CPT | Performed by: INTERNAL MEDICINE

## 2019-11-11 PROCEDURE — G0378 HOSPITAL OBSERVATION PER HR: HCPCS

## 2019-11-11 PROCEDURE — 94640 AIRWAY INHALATION TREATMENT: CPT

## 2019-11-11 PROCEDURE — 25000125 ZZHC RX 250: Performed by: INTERNAL MEDICINE

## 2019-11-11 PROCEDURE — 84295 ASSAY OF SERUM SODIUM: CPT | Performed by: INTERNAL MEDICINE

## 2019-11-11 PROCEDURE — 25000132 ZZH RX MED GY IP 250 OP 250 PS 637: Mod: GY | Performed by: INTERNAL MEDICINE

## 2019-11-11 PROCEDURE — 84300 ASSAY OF URINE SODIUM: CPT | Performed by: STUDENT IN AN ORGANIZED HEALTH CARE EDUCATION/TRAINING PROGRAM

## 2019-11-11 RX ORDER — ACETAMINOPHEN 325 MG/1
650 TABLET ORAL EVERY 4 HOURS PRN
Status: DISCONTINUED | OUTPATIENT
Start: 2019-11-11 | End: 2019-11-11

## 2019-11-11 RX ORDER — SCOPOLAMINE HYDROBROMIDE
.4-.8 POWDER (GRAM) MISCELLANEOUS 3 TIMES DAILY
Status: DISCONTINUED | OUTPATIENT
Start: 2019-11-11 | End: 2019-11-11 | Stop reason: HOSPADM

## 2019-11-11 RX ORDER — IPRATROPIUM BROMIDE AND ALBUTEROL SULFATE 2.5; .5 MG/3ML; MG/3ML
1 SOLUTION RESPIRATORY (INHALATION) EVERY 4 HOURS PRN
Status: DISCONTINUED | OUTPATIENT
Start: 2019-11-11 | End: 2019-11-11 | Stop reason: HOSPADM

## 2019-11-11 RX ORDER — LIDOCAINE HYDROCHLORIDE 20 MG/ML
10 JELLY TOPICAL ONCE
Status: DISCONTINUED | OUTPATIENT
Start: 2019-11-11 | End: 2019-11-11 | Stop reason: HOSPADM

## 2019-11-11 RX ORDER — AMINO AC/PROTEIN HYDR/WHEY PRO 10G-100/30
2 LIQUID (ML) ORAL DAILY
Status: DISCONTINUED | OUTPATIENT
Start: 2019-11-12 | End: 2019-11-11 | Stop reason: HOSPADM

## 2019-11-11 RX ORDER — LEVOTHYROXINE SODIUM 75 UG/1
150 TABLET ORAL EVERY MORNING
Status: DISCONTINUED | OUTPATIENT
Start: 2019-11-11 | End: 2019-11-11 | Stop reason: HOSPADM

## 2019-11-11 RX ORDER — LIDOCAINE 40 MG/G
CREAM TOPICAL
Status: DISCONTINUED | OUTPATIENT
Start: 2019-11-11 | End: 2019-11-11 | Stop reason: HOSPADM

## 2019-11-11 RX ORDER — ACETAMINOPHEN 650 MG/1
650 SUPPOSITORY RECTAL EVERY 4 HOURS PRN
Status: DISCONTINUED | OUTPATIENT
Start: 2019-11-11 | End: 2019-11-11 | Stop reason: HOSPADM

## 2019-11-11 RX ORDER — ALBUTEROL SULFATE 0.83 MG/ML
2.5 SOLUTION RESPIRATORY (INHALATION)
Status: DISCONTINUED | OUTPATIENT
Start: 2019-11-11 | End: 2019-11-11 | Stop reason: HOSPADM

## 2019-11-11 RX ORDER — SODIUM BICARBONATE 650 MG/1
650 TABLET ORAL DAILY
Qty: 4 TABLET | Refills: 0 | Status: SHIPPED | OUTPATIENT
Start: 2019-11-11 | End: 2019-11-15

## 2019-11-11 RX ORDER — NALOXONE HYDROCHLORIDE 0.4 MG/ML
.1-.4 INJECTION, SOLUTION INTRAMUSCULAR; INTRAVENOUS; SUBCUTANEOUS
Status: DISCONTINUED | OUTPATIENT
Start: 2019-11-11 | End: 2019-11-11 | Stop reason: HOSPADM

## 2019-11-11 RX ORDER — ACETAMINOPHEN 325 MG/1
650 TABLET ORAL EVERY 4 HOURS PRN
Status: DISCONTINUED | OUTPATIENT
Start: 2019-11-11 | End: 2019-11-11 | Stop reason: HOSPADM

## 2019-11-11 RX ORDER — ASPIRIN 81 MG/1
81 TABLET, CHEWABLE ORAL DAILY
Status: DISCONTINUED | OUTPATIENT
Start: 2019-11-11 | End: 2019-11-11 | Stop reason: HOSPADM

## 2019-11-11 RX ORDER — ONDANSETRON 4 MG/1
4 TABLET, ORALLY DISINTEGRATING ORAL EVERY 6 HOURS PRN
Status: DISCONTINUED | OUTPATIENT
Start: 2019-11-11 | End: 2019-11-11 | Stop reason: HOSPADM

## 2019-11-11 RX ORDER — CHOLECALCIFEROL (VITAMIN D3) 50 MCG
2000 TABLET ORAL DAILY
Status: DISCONTINUED | OUTPATIENT
Start: 2019-11-11 | End: 2019-11-11 | Stop reason: HOSPADM

## 2019-11-11 RX ORDER — AMINO AC/PROTEIN HYDR/WHEY PRO 10G-100/30
1 LIQUID (ML) ORAL DAILY
Status: DISCONTINUED | OUTPATIENT
Start: 2019-11-11 | End: 2019-11-11

## 2019-11-11 RX ORDER — ONDANSETRON 2 MG/ML
4 INJECTION INTRAMUSCULAR; INTRAVENOUS EVERY 6 HOURS PRN
Status: DISCONTINUED | OUTPATIENT
Start: 2019-11-11 | End: 2019-11-11 | Stop reason: HOSPADM

## 2019-11-11 RX ORDER — GUAR GUM
1 PACKET (EA) ORAL DAILY
Status: DISCONTINUED | OUTPATIENT
Start: 2019-11-11 | End: 2019-11-11 | Stop reason: HOSPADM

## 2019-11-11 RX ORDER — CARBAMAZEPINE 100 MG/5ML
150 SUSPENSION ORAL EVERY 6 HOURS
Status: DISCONTINUED | OUTPATIENT
Start: 2019-11-11 | End: 2019-11-11 | Stop reason: HOSPADM

## 2019-11-11 RX ORDER — HYDROCORTISONE 10 MG/1
10 TABLET ORAL
Status: DISCONTINUED | OUTPATIENT
Start: 2019-11-11 | End: 2019-11-11 | Stop reason: HOSPADM

## 2019-11-11 RX ORDER — HYDROCORTISONE 10 MG/1
20 TABLET ORAL EVERY MORNING
Status: DISCONTINUED | OUTPATIENT
Start: 2019-11-11 | End: 2019-11-11 | Stop reason: HOSPADM

## 2019-11-11 RX ORDER — LANOLIN ALCOHOL/MO/W.PET/CERES
6 CREAM (GRAM) TOPICAL AT BEDTIME
Status: DISCONTINUED | OUTPATIENT
Start: 2019-11-11 | End: 2019-11-11 | Stop reason: HOSPADM

## 2019-11-11 RX ORDER — ACETYLCYSTEINE 200 MG/ML
2 SOLUTION ORAL; RESPIRATORY (INHALATION) 4 TIMES DAILY
Status: DISCONTINUED | OUTPATIENT
Start: 2019-11-11 | End: 2019-11-11 | Stop reason: HOSPADM

## 2019-11-11 RX ADMIN — POTASSIUM & SODIUM PHOSPHATES POWDER PACK 280-160-250 MG 1 PACKET: 280-160-250 PACK at 08:32

## 2019-11-11 RX ADMIN — ASPIRIN 81 MG 81 MG: 81 TABLET ORAL at 08:31

## 2019-11-11 RX ADMIN — HYDROCORTISONE 20 MG: 10 TABLET ORAL at 08:30

## 2019-11-11 RX ADMIN — SODIUM CHLORIDE 1000 ML: 9 INJECTION, SOLUTION INTRAVENOUS at 12:31

## 2019-11-11 RX ADMIN — IPRATROPIUM BROMIDE AND ALBUTEROL SULFATE 3 ML: .5; 3 SOLUTION RESPIRATORY (INHALATION) at 15:17

## 2019-11-11 RX ADMIN — Medication 0.8 MG: at 13:10

## 2019-11-11 RX ADMIN — BRIVARACETAM 100 MG: 10 SOLUTION ORAL at 09:21

## 2019-11-11 RX ADMIN — CARBAMAZEPINE 150 MG: 100 SUSPENSION ORAL at 17:18

## 2019-11-11 RX ADMIN — ACETYLCYSTEINE 2 ML: 200 SOLUTION ORAL; RESPIRATORY (INHALATION) at 11:02

## 2019-11-11 RX ADMIN — ACETYLCYSTEINE 2 ML: 200 SOLUTION ORAL; RESPIRATORY (INHALATION) at 08:01

## 2019-11-11 RX ADMIN — CARBAMAZEPINE 150 MG: 100 SUSPENSION ORAL at 12:30

## 2019-11-11 RX ADMIN — POTASSIUM & SODIUM PHOSPHATES POWDER PACK 280-160-250 MG 1 PACKET: 280-160-250 PACK at 17:17

## 2019-11-11 RX ADMIN — CHOLECALCIFEROL TAB 50 MCG (2000 UNIT) 2000 UNITS: 50 TAB at 08:31

## 2019-11-11 RX ADMIN — HYDROCORTISONE 10 MG: 10 TABLET ORAL at 17:18

## 2019-11-11 RX ADMIN — SODIUM CHLORIDE, POTASSIUM CHLORIDE, SODIUM LACTATE AND CALCIUM CHLORIDE 1000 ML: 600; 310; 30; 20 INJECTION, SOLUTION INTRAVENOUS at 05:05

## 2019-11-11 RX ADMIN — CARBAMAZEPINE 150 MG: 100 SUSPENSION ORAL at 07:47

## 2019-11-11 RX ADMIN — IPRATROPIUM BROMIDE AND ALBUTEROL SULFATE 3 ML: .5; 3 SOLUTION RESPIRATORY (INHALATION) at 08:02

## 2019-11-11 RX ADMIN — Medication 0.8 MG: at 08:32

## 2019-11-11 RX ADMIN — IPRATROPIUM BROMIDE AND ALBUTEROL SULFATE 3 ML: .5; 3 SOLUTION RESPIRATORY (INHALATION) at 11:03

## 2019-11-11 RX ADMIN — Medication 1 PACKET: at 13:04

## 2019-11-11 RX ADMIN — LEVOTHYROXINE SODIUM 150 MCG: 75 TABLET ORAL at 08:31

## 2019-11-11 RX ADMIN — ACETYLCYSTEINE 2 ML: 200 SOLUTION ORAL; RESPIRATORY (INHALATION) at 15:17

## 2019-11-11 RX ADMIN — PANTOPRAZOLE SODIUM 40 MG: 40 TABLET, DELAYED RELEASE ORAL at 08:32

## 2019-11-11 ASSESSMENT — ENCOUNTER SYMPTOMS
TREMORS: 1
FEVER: 1

## 2019-11-11 ASSESSMENT — MIFFLIN-ST. JEOR: SCORE: 1592.38

## 2019-11-11 NOTE — PLAN OF CARE
-diagnostic tests and consults completed and resulted: No  -vital signs normal or at patient baseline: VSS, RA  -serum sodium normalized: No, Na 124, re-draw at 10AM

## 2019-11-11 NOTE — PROGRESS NOTES
Observation Goals:    -diagnostic tests and consults completed and resulted - not met  -vital signs normal or at patient baseline - met  -serum sodium normalized - not met

## 2019-11-11 NOTE — PLAN OF CARE
-diagnostic tests and consults completed and resulted: N/A  -vital signs normal or at patient baseline: VSS, RA  -serum sodium normalized: Na re-draw at 1500. Potential discharge this evening.

## 2019-11-11 NOTE — PLAN OF CARE
AVSS; pt non-verbal, but able to follow command to squeeze his hand; only noted movement in LUE; G-Tube in placed and clamped; pt incontinent of large amounts of urine; LR 1 L bolus given for Na of 124 with recheck due at 1000; coccyx with skin tear; mepliex placed; WOCN consult ordered

## 2019-11-11 NOTE — ED NOTES
"[facility]  ED Nurse Handoff Report    ED Chief complaint: Fever      ED Diagnosis:   Final diagnoses:   Hyponatremia   Fever, unspecified fever cause       Code Status: to be determined by hospitalist    Allergies:   Allergies   Allergen Reactions     Dilantin [Phenytoin Sodium]      Valproic Acid      Toxicity c bone marrow suspension, elevated ammonia levels        Activity level - Baseline/Home:  Total care  Activity Level - Current:   Total Care    Patient's Preferred language: english   Needed?: No    Isolation: Yes  Infection: Not Applicable  MRSA  Bariatric?: No    Vital Signs:   Vitals:    11/10/19 2223 11/11/19 0038   BP: 107/71 106/59   Pulse:  95   Resp: 20    Temp: 99.6  F (37.6  C)    TempSrc: Oral    SpO2: 100% 95%   Weight: 73.9 kg (163 lb)    Height: 1.829 m (6')        Cardiac Rhythm: ,        Pain level: 0-10 Pain Scale: 0    Is this patient confused?: No   Does this patient have a guardian?  No         If yes, is there guardianship documents in the Epic \"Code/ACP\" activity?  N/A         Guardian Notified?  N/A  [place] - Suicide Severity Rating Scale Completed?  Yes  If yes, what color did the patient score?  White    Patient Report: Initial Complaint: Pt presents to ED with elevated temp.  Focused Assessment: Pt slightly febrile, 100.2F at home, 99.6F here.  Pt lungs clear bilaterally.  Hx of TBI so has expressive aphasia.  Mother at bedside.  Stg 2 pressure ulcer noted to coccyx.  Tests Performed:   Results for orders placed or performed during the hospital encounter of 11/10/19   CBC with platelets differential     Status: Abnormal   Result Value Ref Range    WBC 13.8 (H) 4.0 - 11.0 10e9/L    RBC Count 4.26 (L) 4.4 - 5.9 10e12/L    Hemoglobin 11.2 (L) 13.3 - 17.7 g/dL    Hematocrit 34.0 (L) 40.0 - 53.0 %    MCV 80 78 - 100 fl    MCH 26.3 (L) 26.5 - 33.0 pg    MCHC 32.9 31.5 - 36.5 g/dL    RDW 14.3 10.0 - 15.0 %    Platelet Count 191 150 - 450 10e9/L    Diff Method " Automated Method     % Neutrophils 74.7 %    % Lymphocytes 14.1 %    % Monocytes 7.9 %    % Eosinophils 2.6 %    % Basophils 0.5 %    % Immature Granulocytes 0.2 %    Nucleated RBCs 0 0 /100    Absolute Neutrophil 10.3 (H) 1.6 - 8.3 10e9/L    Absolute Lymphocytes 2.0 0.8 - 5.3 10e9/L    Absolute Monocytes 1.1 0.0 - 1.3 10e9/L    Absolute Eosinophils 0.4 0.0 - 0.7 10e9/L    Absolute Basophils 0.1 0.0 - 0.2 10e9/L    Abs Immature Granulocytes 0.0 0 - 0.4 10e9/L    Absolute Nucleated RBC 0.0    Comprehensive metabolic panel     Status: Abnormal   Result Value Ref Range    Sodium 124 (L) 133 - 144 mmol/L    Potassium 4.3 3.4 - 5.3 mmol/L    Chloride 92 (L) 94 - 109 mmol/L    Carbon Dioxide 26 20 - 32 mmol/L    Anion Gap 6 3 - 14 mmol/L    Glucose 97 70 - 99 mg/dL    Urea Nitrogen 15 7 - 30 mg/dL    Creatinine 0.73 0.66 - 1.25 mg/dL    GFR Estimate >90 >60 mL/min/[1.73_m2]    GFR Estimate If Black >90 >60 mL/min/[1.73_m2]    Calcium 8.4 (L) 8.5 - 10.1 mg/dL    Bilirubin Total 0.3 0.2 - 1.3 mg/dL    Albumin 3.1 (L) 3.4 - 5.0 g/dL    Protein Total 7.5 6.8 - 8.8 g/dL    Alkaline Phosphatase 93 40 - 150 U/L    ALT 25 0 - 70 U/L    AST 25 0 - 45 U/L   Procalcitonin     Status: None   Result Value Ref Range    Procalcitonin <0.05 ng/ml   ISTAT gases lactate gabe POCT     Status: Abnormal   Result Value Ref Range    Ph Venous 7.45 (H) 7.32 - 7.43 pH    PCO2 Venous 34 (L) 40 - 50 mm Hg    PO2 Venous 44 25 - 47 mm Hg    Bicarbonate Venous 24 21 - 28 mmol/L    O2 Sat Venous 82 %    Lactic Acid 1.7 0.7 - 2.1 mmol/L   Influenza A/B antigen     Status: None   Result Value Ref Range    Influenza A/B Agn Specimen Nasopharyngeal     Influenza A Negative NEG^Negative    Influenza B Negative NEG^Negative       Abnormal Results: see above  Treatments provided: see MAR    Family Comments: Mother at bedside.    OBS brochure/video discussed/provided to patient/family: N/A              Name of person given brochure if not patient: n/a               Relationship to patient: n/a    ED Medications:   Medications   sodium chloride (PF) 0.9% PF flush 3 mL (has no administration in time range)   sodium chloride (PF) 0.9% PF flush 3 mL (has no administration in time range)   0.9% sodium chloride BOLUS (2,000 mLs Intravenous New Bag 11/10/19 8118)       Drips infusing?:  No    For the majority of the shift this patient was Green.   Interventions performed were tlc.    Severe Sepsis OR Septic Shock Diagnosis Present: No    To be done/followed up on inpatient unit:  n/a    ED NURSE PHONE NUMBER: [phone]

## 2019-11-11 NOTE — PLAN OF CARE
Patient is non-verbal. HECTOR orientation. Will sometimes follow commands, but inconsistent. Strict NPO, okay to give meds through PEG tube. Ax2 lift, total cares. Condom cath placed with aaron bag. Turn and repo Q2H. IV bolus of NS given, re-check Na levels at 1500. Likely discharge back home with home care later this evening.

## 2019-11-11 NOTE — PHARMACY-ADMISSION MEDICATION HISTORY
Pharmacy Medication History  Admission medication history interview status for the 11/10/2019  admission is complete. See EPIC admission navigator for prior to admission medications     Medication history sources:  Chart review. Gonzalez Everywhere, Surescripts.  Medication history source reliability: Good  Adherence assessment: Good    Additional medication history information:   Patient's guardian/mother was not present to confirm last doses. Pt was discharged from [facility] 10/29/[facility] & had an endocrinology appointment on 11/4/19. PTA list should be up to date.    Medication reconciliation completed by provider prior to medication history? Yes    Time spent in this activity: 15 minutes      Prior to Admission medications    Medication Sig Last Dose Taking? Auth Provider   acetylcysteine (MUCOMYST) 20 % neb solution Take 2 mLs by nebulization every 6 hours With albuterol at 0700, 1100, 1500, and 1900  Yes Unknown, Entered By History   albuterol (PROVENTIL) (5 MG/ML) 0.5% neb solution Take 2.5 mg by nebulization every 4 hours (while awake) 0700 1100 1500 1900 with mucomyst   Yes Unknown, Entered By History   aspirin (ASA) 81 MG chewable tablet 81 mg by Oral or Feeding Tube route daily At 0900  Yes Unknown, Entered By History   bacitracin ointment Apply topically daily as needed for wound care To PEG site.   Yes Unknown, Entered By History   Bioflavonoid Products (VITAMIN C PLUS) 1000 MG TABS 1 tablet by Oral or FT or NG tube route  Yes Unknown, Entered By History   Brivaracetam (BRIVIACT) 10 MG/ML solution 100 mg by Oral or Feeding Tube route 2 times daily 0900, 2100  Yes Unknown, Entered By History   calcium carbonate 1250 (500 CA) MG/5ML SUSP suspension 5 mLs (1,250 mg) by Per J Tube route 3 times daily (with meals)  Yes Mariana Venegas MD   carBAMazepine (TEGRETOL) 100 MG/5ML suspension 150 mg by Oral or Feeding Tube route every 6 hours At 06:00, 12:00, 18:00 and 24:00 for seizures   Yes Unknown, Entered By  History   ferrous sulfate 220 (44 Fe) MG/5ML ELIX 220 mg by Per Feeding Tube route daily  Yes Unknown, Entered By History   Guar Gum (FIBER MODULAR, NUTRISOURCE FIBER,) packet 1 packet by Per G Tube route daily  Yes Starr Champion MD   hydrocortisone (CORTEF) 5 MG tablet 10 mg by Oral or FT or NG tube route daily (with dinner) At 1500  Yes Unknown, Entered By History   hydrocortisone (CORTEF) 5 MG tablet 20 mg by Oral or FT or NG tube route every morning   Yes Unknown, Entered By History   hydrocortisone 1 % CREA cream Place rectally 2 times daily as needed for other Apply to reddened memo areas as needed  Yes Unknown, Entered By History   ipratropium - albuterol 0.5 mg/2.5 mg/3 mL (DUONEB) 0.5-2.5 (3) MG/3ML neb solution Take 1 vial by nebulization every 4 hours as needed (bronchospasms or cough)  Yes Unknown, Entered By History   levothyroxine (SYNTHROID/LEVOTHROID) 150 MCG tablet Take 150 mcg by mouth every morning  Yes Unknown, Entered By History   melatonin (MELATONIN) 1 MG/ML LIQD liquid 6 mg by Per NG tube route At Bedtime   Yes Unknown, Entered By History   metoclopramide (REGLAN) 5 MG/5ML solution 5 mg by Per Feeding Tube route 2 times daily  Yes Unknown, Entered By History   miconazole (MICATIN) 2 % AERP powder Apply topically 2 times daily as needed   Yes Unknown, Entered By History   mupirocin (BACTROBAN) 2 % external ointment Apply topically 2 times daily as needed   Yes Reported, Patient   pantoprazole (PROTONIX) 2 mg/mL SUSP suspension 20 mLs (40 mg) by Per J Tube route daily  Yes Washington Connors MD   potassium & sodium phosphates (NEUTRA-PHOS) 280-160-250 MG Packet Take 1 packet by mouth 2 times daily   Yes Yanely Liriano MD   scopolamine (TRANSDERM) 1 MG/3DAYS 72 hr patch Place 1 patch onto the skin every 72 hours  Yes Edgar Jenkins MD   Scopolamine HBr POWD G-tube regimen of 0.8 at 8am then 0.8 noon and 0.4 bedtime.  Yes Perlman, David Morris, MD   Skin Protectants, Misc. (BALMEX  SKIN PROTECTANT) OINT Externally apply topically 2 times daily as needed (irritation) Applay to reddened memo areas twice daily as needed  Yes Unknown, Entered By History   testosterone cypionate (DEPOTESTOTERONE CYPIONATE) 200 MG/ML injection Inject 76 mg into the muscle See Admin Instructions Every 2 weeks on Fridays   76 mg or 0.38 mL  Yes Unknown, Entered By History   vitamin D3 (CHOLECALCIFEROL) 2000 units (50 mcg) tablet Take 2,000 Units by mouth daily Crush and feed via j-tube @@ 0900  Yes Unknown, Entered By History   order for DME Equipment being ordered: Nebulizer   Starr Champion MD

## 2019-11-11 NOTE — DISCHARGE SUMMARY
[facility]  Hospitalist Discharge Summary       Date of Admission:  11/10/2019  Date of Discharge:  11/11/2019  Discharging Provider: Ricky Torres MD    Discharge Diagnoses     Hyponatremia    Follow-ups Needed After Discharge   Follow-up Appointments     Follow-up and recommended labs and tests       Follow up with primary care provider, Carlos Gomez, within 3 days for   hospital follow- up.  The following labs/tests are recommended: BMP.           Unresulted Labs Ordered in the Past 30 Days of this Admission     Date and Time Order Name Status Description    11/10/2019 2239 Blood culture Preliminary     11/10/2019 2239 Blood culture Preliminary         Discharge Disposition     Admited to home care:   Agency: [facility]  Discharged to home  Condition at discharge: Stable    Hospital Course      Keyon Farias is a 59 year old male admitted on 11/10/2019. He presents after lipsmacking behavior and fever measured at home consistent with prior aspiration events for which he is often evaluated in the emergency department and admitted, though noted to be more sleepy and less interactive over the past several days, found to have hyponatremia to 124.      Hyponatremia  Assessment: Sodiu of 124. m Suspect hypovolemic hyponatremia with insensible losses with hypokalemia contributing to patient's decreased level of interactivity.  Typically does not have significant issues with hyponatremia.  Completely tube feed dependent.  Patient's mother reports that she has had a heater turned on in his room overnight for the past week or so in an attempt to dry out his secretions, though patient will often be very warm and sweating with this. Nutrition consulted for resumption of tube feeds  Plan:  -Discussed discontinuing use of heater in patient's room versus having automatic shut off based on room temperature greater than 70 given patient's inability to necessarily make his needs known, limited mobility, and inability  to adjust his fluid intake to compensate for insensible losses. Received 2 L normal saline in the emergency department, Na improved 124 -> 128.  Plan:  -Following discharge, recommend 1 week follow-up for baseline serum sodium to assess potential need for increased free water/tube feeds.     Aspiration pneumonitis: Patient presents with fever measured at home, lipsmacking behavior, mild leukocytosis.  Found to have chronic right-sided chest x-ray findings consistent with prior aspiration events.  Procalcitonin obtained and undetectable.  No antibiotics were initiated in the emergency department.  I have previously discussed with patient's mother holding anti-infectives for suspected aspiration pneumonitis if procalcitonin low or undetectable given frequent admissions where patient receives single doses of broad-spectrum anti-infectives and rapidly improves.  Will continue to monitor off of anti-infectives for evidence of decompensation/increased suspicion for developing aspiration pneumonia.  Plan:  -Continue prior to admission Mucomyst/albuterol nebulizer treatments  -Continue prior to admission duo nebs  -Head of bed at 30 degrees  -Strict n.p.o. status   -Continue prior to admission scopolamine powder; patient's mother left home medication here as it is unavailable through our pharmacy.  Please relabel for use during hospitalization.      Panhypopituitarism  Assessment:   Plan:   -Resume prior to admission testosterone at discharge  -Resume prior to admission hydrocortisone 20 mg every morning, 10 mg nightly  -Resume prior to admission levothyroxine      Epilepsy:   Assessment: Secondary to TBI  Plan:  -Continue prior to admission Tegretol, Briviact  -Outpatient neurology follow-up with Dr. Phylicia Stout of [facility]     Perianal pressure injury with skin tear: Patient with skin tear as well as some induration of perianal area.  I admitted patient in early October, and he had a notable  Candida rash in the setting of urinary incontinence at that time. Candidal infection appears to have resolved.  Plan:  -Miconazole powder  -Wound nurse consulted for early pressure injury problem with skin tear  -Frequent repositioning     Skin breakdown on penis:   Assessment: Between border of shaft of penis and glans penis on the right. Suspect secondary to condom catheter.  Plan:  -Discontinue condom catheter as Candida rash has largely resolved     Consultations This Hospital Stay   NUTRITION SERVICES ADULT IP CONSULT  WOUND OSTOMY CONTINENCE NURSE  IP CONSULT  PHARMACY IP CONSULT  SOCIAL WORK IP CONSULT    Code Status   Full Code    Time Spent on this Encounter   I, Ricky Torres MD, personally saw the patient today and spent greater than 30 minutes discharging this patient.       Ricky Torres MD  [facility]  ______________________________________________________________________    Physical Exam   Vital Signs: Temp: 95.6  F (35.3  C) Temp src: Axillary BP: 105/56 Pulse: 91 Heart Rate: 67 Resp: 16 SpO2: 93 % O2 Device: None (Room air)    Weight: 160 lbs 12.8 oz         Primary Care Physician   Carlos Gomez    Discharge Orders      Home care nursing referral      Home Care PT Referral for Hospital Discharge      Home Care OT Referral for Hospital Discharge      Reason for your hospital stay    You had low sodium     Follow-up and recommended labs and tests     Follow up with primary care provider, Carlos Gomez, within 3 days for hospital follow- up.  The following labs/tests are recommended: BMP.     Activity    Your activity upon discharge: activity as tolerated     Diet    Follow this diet upon discharge: Orders Placed This Encounter      Adult Formula Drip Feeding: Continuous Isosource 1.5; Jejunostomy; Goal Rate: 100; mL/hr; From: 7:00 AM; 7:00 PM; Medication - Feeding Tube Flush Frequency: At least 15-30 mL water before and after medication administration and with tube clogging;...      NPO  for Medical/Clinical Reasons Except for: No Exceptions     Significant Results and Procedures   Most Recent 3 CBC's:  Recent Labs   Lab Test 11/10/19  2238 10/27/19  0856 10/26/19  0837   WBC 13.8* 7.4 11.9*   HGB 11.2* 10.4* 9.8*   MCV 80 85 84    210 181     Most Recent 3 BMP's:  Recent Labs   Lab Test 11/11/19  1010 11/11/19  0345 11/10/19  2238 10/28/19  0746 10/26/19  0837   * 124* 124* 134 139   POTASSIUM  --   --  4.3 3.6 3.8   CHLORIDE  --   --  92* 103 109   CO2  --   --  26 27 27   BUN  --   --  15 15 14   CR  --   --  0.73 0.81 0.85   ANIONGAP  --   --  6 4 3   STEVE  --   --  8.4* 8.4* 7.1*   GLC  --   --  97 83 85     Most Recent 2 LFT's:  Recent Labs   Lab Test 11/10/19  2238 10/25/19  2000   AST 25 22   ALT 25 31   ALKPHOS 93 80   BILITOTAL 0.3 0.2     Most Recent 3 INR's:  Recent Labs   Lab Test 02/07/17  0452 01/30/17  0340 01/25/17  0426   INR 1.27* 1.32* 1.49*   ,   Results for orders placed or performed during the hospital encounter of 11/10/19   Chest XR,  PA & LAT    Narrative    CHEST 2 VIEWS   11/11/2019 12:20 AM     HISTORY: Fever.    COMPARISON: 10/25/2019.    FINDINGS: Hypoinflated lungs. A few linear opacities within bilateral  lung bases. Normal-sized cardiac silhouette. Moderate elevation of the  right hemidiaphragm. Partial visualization of fusion hardware in the  cervical spine.      Impression    IMPRESSION:   1. A few linear opacities are present in bilateral lung bases. A  similar appearance was present on the comparison study and therefore  these most likely relate to scarring and/or atelectasis. Pneumonia  cannot be excluded.  2. No other findings suspicious for active cardiopulmonary disease.    ELODIA ANN MD       Discharge Medications   Current Discharge Medication List      START taking these medications    Details   sodium bicarbonate 650 MG tablet Take 1 tablet (650 mg) by mouth daily  Qty: 4 tablet, Refills: 0    Associated Diagnoses: Hyponatremia          CONTINUE these medications which have NOT CHANGED    Details   acetylcysteine (MUCOMYST) 20 % neb solution Take 2 mLs by nebulization every 6 hours With albuterol at 0700, 1100, 1500, and 1900      albuterol (PROVENTIL) (5 MG/ML) 0.5% neb solution Take 2.5 mg by nebulization every 4 hours (while awake) 0700 1100 1500 1900 with mucomyst       aspirin (ASA) 81 MG chewable tablet 81 mg by Oral or Feeding Tube route daily At 0900      bacitracin ointment Apply topically daily as needed for wound care To PEG site.       Bioflavonoid Products (VITAMIN C PLUS) 1000 MG TABS 1 tablet by Oral or FT or NG tube route      Brivaracetam (BRIVIACT) 10 MG/ML solution 100 mg by Oral or Feeding Tube route 2 times daily 0900, 2100      calcium carbonate 1250 (500 CA) MG/5ML SUSP suspension 5 mLs (1,250 mg) by Per J Tube route 3 times daily (with meals)  Qty: 450 mL    Associated Diagnoses: Malnutrition (H)      carBAMazepine (TEGRETOL) 100 MG/5ML suspension 150 mg by Oral or Feeding Tube route every 6 hours At 06:00, 12:00, 18:00 and 24:00 for seizures       ferrous sulfate 220 (44 Fe) MG/5ML ELIX 220 mg by Per Feeding Tube route daily      Guar Gum (FIBER MODULAR, NUTRISOURCE FIBER,) packet 1 packet by Per G Tube route daily  Qty: 30 packet, Refills: 0    Associated Diagnoses: Pneumonia of both lower lobes due to infectious organism (H)      !! hydrocortisone (CORTEF) 5 MG tablet 10 mg by Oral or FT or NG tube route daily (with dinner) At 1500      !! hydrocortisone (CORTEF) 5 MG tablet 20 mg by Oral or FT or NG tube route every morning       hydrocortisone 1 % CREA cream Place rectally 2 times daily as needed for other Apply to reddened memo areas as needed      ipratropium - albuterol 0.5 mg/2.5 mg/3 mL (DUONEB) 0.5-2.5 (3) MG/3ML neb solution Take 1 vial by nebulization every 4 hours as needed (bronchospasms or cough)      levothyroxine (SYNTHROID/LEVOTHROID) 150 MCG tablet Take 150 mcg by mouth every morning      melatonin  (MELATONIN) 1 MG/ML LIQD liquid 6 mg by Per NG tube route At Bedtime       metoclopramide (REGLAN) 5 MG/5ML solution 5 mg by Per Feeding Tube route 2 times daily      miconazole (MICATIN) 2 % AERP powder Apply topically 2 times daily as needed       mupirocin (BACTROBAN) 2 % external ointment Apply topically 2 times daily as needed       pantoprazole (PROTONIX) 2 mg/mL SUSP suspension 20 mLs (40 mg) by Per J Tube route daily  Qty: 400 mL, Refills: 1    Associated Diagnoses: Gastroesophageal reflux disease, esophagitis presence not specified      potassium & sodium phosphates (NEUTRA-PHOS) 280-160-250 MG Packet Take 1 packet by mouth 2 times daily       scopolamine (TRANSDERM) 1 MG/3DAYS 72 hr patch Place 1 patch onto the skin every 72 hours  Qty: 10 patch, Refills: 0    Associated Diagnoses: Traumatic brain injury with loss of consciousness, sequela (H)      Scopolamine HBr POWD G-tube regimen of 0.8 at 8am then 0.8 noon and 0.4 bedtime.  Qty: 1 Bottle, Refills: 11    Comments: RN called into speciality pharmacy on 11/1/19.      Skin Protectants, Misc. (BALMEX SKIN PROTECTANT) OINT Externally apply topically 2 times daily as needed (irritation) Applay to reddened memo areas twice daily as needed      testosterone cypionate (DEPOTESTOTERONE CYPIONATE) 200 MG/ML injection Inject 76 mg into the muscle See Admin Instructions Every 2 weeks on Fridays   76 mg or 0.38 mL      vitamin D3 (CHOLECALCIFEROL) 2000 units (50 mcg) tablet Take 2,000 Units by mouth daily Crush and feed via j-tube @@ 0900      order for DME Equipment being ordered: Nebulizer  Qty: 1 Units, Refills: 0    Associated Diagnoses: Pneumonia of both lower lobes due to infectious organism (H)       !! - Potential duplicate medications found. Please discuss with provider.        Allergies   Allergies   Allergen Reactions     Dilantin [Phenytoin Sodium]      Valproic Acid      Toxicity c bone marrow suspension, elevated ammonia levels

## 2019-11-11 NOTE — H&P
[facility]    History and Physical - Hospitalist Service       Date of Admission:  11/10/2019    Assessment & Plan   Keyon Farias is a 59 year old male admitted on 11/10/2019. He presents after lipsmacking behavior and fever measured at home consistent with prior aspiration events for which he is often evaluated in the emergency department and admitted, though noted to be more sleepy and less interactive over the past several days, found to have hyponatremia to 124.    Hyponatremia: Sodium of 124.  Suspect hypovolemic hyponatremia with insensible losses with hypokalemia contributing to patient's decreased level of interactivity.  Typically does not have significant issues with hyponatremia.  Completely tube feed dependent.  Patient's mother reports that she has had a heater turned on in his room overnight for the past week or so in an attempt to dry out his secretions, though patient will often be very warm and sweating with this.  -Discussed discontinuing use of heater in patient's room versus having automatic shut off based on room temperature greater than 70 given patient's inability to necessarily make his needs known, limited mobility, and inability to adjust his fluid intake to compensate for insensible losses.  -Received 2 L normal saline in the emergency department, repeating sodium on arrival to floor  -With repeat sodium 124, additional 1 L lactated Ringer bolus  -Recheck serum sodium 10 AM following lactated Ringer bolus.  -Intake and output  -Nutrition consulted for resumption of tube feeds  -Following discharge, if patient is not seen soon in the emergency department, recommend 1 week follow-up for baseline serum sodium to assess potential need for increased free water/tube feeds.    Aspiration pneumonitis: Patient presents with fever measured at home, lipsmacking behavior, mild leukocytosis.  Found to have chronic right-sided chest x-ray findings consistent with prior aspiration  events.  Procalcitonin obtained and undetectable.  No antibiotics were initiated in the emergency department.  I have previously discussed with patient's mother holding anti-infectives for suspected aspiration pneumonitis if procalcitonin low or undetectable given frequent admissions where patient receives single doses of broad-spectrum anti-infectives and rapidly improves.  Will continue to monitor off of anti-infectives for evidence of decompensation/increased suspicion for developing aspiration pneumonia.  -Continue prior to admission Mucomyst/albuterol nebulizer treatments  -Continue prior to admission duo nebs  -Head of bed at 30 degrees  -Strict n.p.o. status   -Continue prior to admission scopolamine powder; patient's mother left home medication here as it is unavailable through our pharmacy.  Please relabel for use during hospitalization.     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 Phylicia Stout of [facility]    Perianal pressure injury with skin tear: Patient with skin tear as well as some induration of perianal area.  I admitted patient in early October, and he had a notable Candida rash in the setting of urinary incontinence at that time.  Candidal infection appears to have resolved  -Miconazole powder  -Wound nurse consulted for early pressure injury problem with skin tear  -Frequent repositioning    Skin breakdown on penis: Between border of shaft of penis and glans penis on the right.  Suspect secondary to condom catheter  -Discontinue condom catheter as Candida rash has largely resolved       Diet:   DVT Prophylaxis: Pneumatic Compression Devices  Lerma Catheter: not  present  Code Status: Full code    Disposition Plan   Expected discharge: Tomorrow, recommended to prior living arrangement once Serum sodium improving, no further evidence of infection..  Entered: Jerald Villavicencio MD 11/11/2019, 1:22 AM     The patient's care was discussed with the Patient and Patient's mother/guardian at bedside, Dr. Odonnell in the emergency department.    Jerald Villavicencio MD  [facility]    ______________________________________________________________________    Chief Complaint   Fever    History is obtained from the patient's mother at bedside, chart review, discussion with Dr. Odonnell in the emergency department.  Patient is nonverbal and unable to contribute to history in any meaningful way.    History of Present Illness   Keyon Farias is a 59 year old male who presents to the emergency department after he was noted to have a fever as well as lipsmacking behaviors at home.  This is consistent with his frequent admissions for aspiration pneumonia versus pneumonitis events.  On arrival to the emergency department, he is actually afebrile.  Quite typically on prior admissions, patient will have a very high fever with lipsmacking behaviors, typically without significant hypoxia.  Given his frequent admissions for the same, patient is often given a dose of vancomycin and Zosyn in the emergency department based on sepsis protocol with chronic right-sided chest x-ray findings, and typically rapidly recovers.  As of late, a procalcitonin has been checked and been negative, and following a lengthy discussion with patient's mother October 7, 2019 when I last admitted him, my plan of care was for holding on anti-infectives if procalcitonin remains low or undetectable, monitoring to ensure no developing aspiration pneumonitis as I anticipate majority of patients presentations are actually related to aspiration pneumonitis given his rapid improvement/resolution of symptoms and the fact that  he will often have similar events even when completing an outpatient course of Augmentin following a hospitalization.    In the emergency department today, procalcitonin again is undetectable.  Patient was, however, noted to have a serum sodium of 124.  Has had mild hyponatremia in the past, though has been on stable doses of medications and tube feeds with free water flushes for his chronic aspiration and panhypopituitarism for several years.  When trying to elicit cause of patient's hyponatremia, it comes to light that patient's mother has been using a heater in the patient's room overnight for the past 1 to 2 weeks in an attempt to dry out his secretions.  She reports that patient becomes quite sweaty and it is quite warm in the room with the heater.  This room heater has the ability to have an automatic shut off when it reaches a certain temperature, though this feature has not been utilized.  Patient's mother is not certain as to what the temperature is in the room, and has not measured the temperature in the room, though again, she reports that it is hot and patient is sweating overnight.  As patient is completely tube feed dependent and has a limited ability to make his needs known, discussed my concern that patient may actually have significant insensible losses that are not being repleted with current use of heat or.  Recommended discontinuation of use of heater or setting heater to an automatic shut off of around 70 degrees.  If patient is sweating, he is too warm, and experiencing insensible losses that will be difficult to gauge in a tube feed dependent individual.      Patient's mother notes that he has been slightly less interactive over the past 2 days.  Was still able to go to his adult  program on Friday, though slept most of the day on Sunday.  Though he is hyponatremic, this is only moderate hyponatremia, and would not necessarily explain patient's sedate state.  I primarily suspect that  "volume depletion is playing a more prominent role in patient's decreased level of interactivity and increased fatigue rather than hyponatremia at 124.  This said, certainly possible that patient is sensitive to sodium shifts.    Review of Systems    Review of systems not obtained due to patient factors - mental status; patient is nonverbal.  He does occasionally give a thumbs up, though when his oximeter is on his thumb, he gives a \"pointer finger up.\"    Past Medical History    I have reviewed this patient's medical history and updated it with pertinent information if needed.   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  Past Surgical History:   Procedure Laterality Date     ENDOSCOPIC ULTRASOUND UPPER GASTROINTESTINAL TRACT (GI) N/A 1/30/2017    Procedure: ENDOSCOPIC ULTRASOUND, ESOPHAGOSCOPY / UPPER GASTROINTESTINAL TRACT (GI);  Surgeon: Jus Montana MD;  Location: [facility] OR     ENDOSCOPIC ULTRASOUND, ESOPHAGOSCOPY, GASTROSCOPY, DUODENOSCOPY (EGD), NECROSECTOMY N/A 2/7/2017    Procedure: ENDOSCOPIC ULTRASOUND, " ESOPHAGOSCOPY, GASTROSCOPY, DUODENOSCOPY (EGD), NECROSECTOMY;  Surgeon: Jack Marcus MD;  Location:  OR     ESOPHAGOSCOPY, GASTROSCOPY, DUODENOSCOPY (EGD), COMBINED  3/13/2014    Procedure: COMBINED ESOPHAGOSCOPY, GASTROSCOPY, DUODENOSCOPY (EGD), BIOPSY SINGLE OR MULTIPLE;  gastroscopy;  Surgeon: Digna Rhodes MD;  Location:  GI     ESOPHAGOSCOPY, GASTROSCOPY, DUODENOSCOPY (EGD), COMBINED N/A 12/6/2016    Procedure: COMBINED ESOPHAGOSCOPY, GASTROSCOPY, DUODENOSCOPY (EGD);  Surgeon: Digna Rhodes MD;  Location:  GI     ESOPHAGOSCOPY, GASTROSCOPY, DUODENOSCOPY (EGD), COMBINED N/A 2/7/2017    Procedure: COMBINED ENDOSCOPIC ULTRASOUND, ESOPHAGOSCOPY, GASTROSCOPY, DUODENOSCOPY (EGD), FINE NEEDLE ASPIRATE/BIOPSY;  Surgeon: Too Thakur MD;  Location:  OR     HEAD & NECK SURGERY      reconstructive facial surgery following accident in 1989     IR FOLLOW UP VISIT INPATIENT  2/20/2019     IR GASTRO JEJUNOSTOMY TUBE CHANGE  12/20/2018     IR GASTRO JEJUNOSTOMY TUBE CHANGE  2/4/2019     IR GASTRO JEJUNOSTOMY TUBE CHANGE  3/8/2019     IR GASTRO JEJUNOSTOMY TUBE CHANGE  8/7/2019     IR PICC EXCHANGE LEFT  8/15/2019     LAPAROSCOPIC APPENDECTOMY  7/30/2013    Procedure: LAPAROSCOPIC APPENDECTOMY;  LAPAROSCOPIC APPENDECTOMY;  Surgeon: Manish Pierce MD;  Location:  OR     LAPAROSCOPIC ASSISTED INSERTION TUBE GASTROTOMY N/A 9/7/2016    Procedure: LAPAROSCOPIC ASSISTED INSERTION TUBE GASTROSTOMY;  Surgeon: Manish Pierce MD;  Location:  OR     ORTHOPEDIC SURGERY      right hand repair     TRACHEOSTOMY N/A 9/3/2016    Procedure: TRACHEOSTOMY;  Surgeon: João Ortiz MD;  Location:  OR     TRACHEOSTOMY N/A 12/2/2016    Procedure: TRACHEOSTOMY;  Surgeon: João Ortiz MD;  Location:  OR     VASCULAR SURGERY         Social History   I have reviewed this patient's social history and updated it with pertinent information if needed.  Social History      Tobacco Use     Smoking status: Former Smoker     Last attempt to quit: 1989     Years since quittin.5     Smokeless tobacco: Never Used   Substance Use Topics     Alcohol use: No     Drug use: No       Family History   I have reviewed this patient's family history and updated it with pertinent information if needed.   Family History   Problem Relation Age of Onset     Cancer Father    mother w/ back pain/lumbago    Prior to Admission Medications   Prior to Admission Medications   Prescriptions Last Dose Informant Patient Reported? Taking?   Bioflavonoid Products (VITAMIN C PLUS) 1000 MG TABS  Mother Yes No   Si tablet by Oral or FT or NG tube route   Brivaracetam (BRIVIACT) 10 MG/ML solution  Mother Yes No   Si mg by Oral or Feeding Tube route 2 times daily 0900, 2100   Guar Gum (FIBER MODULAR, NUTRISOURCE FIBER,) packet  Mother No No   Si packet by Per G Tube route daily   Scopolamine HBr POWD   Yes No   Sig: G-tube regimen of 0.8 at 8am then 0.8 noon and 0.4 bedtime.   Skin Protectants, Misc. (BALMEX SKIN PROTECTANT) OINT  Mother Yes No   Sig: Externally apply topically 2 times daily as needed (irritation) Applay to reddened memo areas twice daily as needed   acetylcysteine (MUCOMYST) 20 % neb solution  Mother Yes No   Sig: Take 2 mLs by nebulization every 6 hours With albuterol at 0700, 1100, 1500, and 1900   albuterol (PROVENTIL) (5 MG/ML) 0.5% neb solution  Mother Yes No   Sig: Take 2.5 mg by nebulization every 4 hours (while awake) 0700 1100 1500 1900 with mucomyst    aspirin (ASA) 81 MG chewable tablet  Mother Yes No   Si mg by Oral or Feeding Tube route daily At 0900   bacitracin ointment  Mother Yes No   Sig: Apply topically daily as needed for wound care To PEG site.    calcium carbonate 1250 (500 CA) MG/5ML SUSP suspension  Mother No No   Si mLs (1,250 mg) by Per J Tube route 3 times daily (with meals)   carBAMazepine (TEGRETOL) 100 MG/5ML suspension  Mother Yes No    Si mg by Oral or Feeding Tube route every 6 hours At 06:00, 12:00, 18:00 and 24:00 for seizures    ferrous sulfate 220 (44 Fe) MG/5ML ELIX  Mother Yes No   Si mg by Per Feeding Tube route daily   hydrocortisone (CORTEF) 5 MG tablet  Mother Yes No   Sig: 10 mg by Oral or FT or NG tube route daily (with dinner) At 1500   hydrocortisone (CORTEF) 5 MG tablet  Mother Yes No   Si mg by Oral or FT or NG tube route every morning    hydrocortisone 1 % CREA cream  Mother Yes No   Sig: Place rectally 2 times daily as needed for other Apply to reddened memo areas as needed   ipratropium - albuterol 0.5 mg/2.5 mg/3 mL (DUONEB) 0.5-2.5 (3) MG/3ML neb solution  Mother Yes No   Sig: Take 1 vial by nebulization every 4 hours as needed (bronchospasms or cough)   levothyroxine (SYNTHROID/LEVOTHROID) 150 MCG tablet  Mother Yes No   Sig: Take 150 mcg by mouth every morning   melatonin (MELATONIN) 1 MG/ML LIQD liquid  Mother Yes No   Si mg by Per NG tube route At Bedtime    metoclopramide (REGLAN) 5 MG/5ML solution  Mother Yes No   Si mg by Per Feeding Tube route 2 times daily   miconazole (MICATIN) 2 % AERP powder  Mother Yes No   Sig: Apply topically 2 times daily as needed    mupirocin (BACTROBAN) 2 % external ointment  Mother Yes No   Sig: Apply topically 2 times daily as needed    order for DME  Mother No No   Sig: Equipment being ordered: Nebulizer   pantoprazole (PROTONIX) 2 mg/mL SUSP suspension  Mother No No   Si mLs (40 mg) by Per J Tube route daily   potassium & sodium phosphates (NEUTRA-PHOS) 280-160-250 MG Packet   Yes No   Sig: Take 1 packet by mouth 2 times daily    scopolamine (TRANSDERM) 1 MG/3DAYS 72 hr patch   No No   Sig: Place 1 patch onto the skin every 72 hours   testosterone cypionate (DEPOTESTOTERONE CYPIONATE) 200 MG/ML injection  Mother Yes No   Sig: Inject 76 mg into the muscle See Admin Instructions Every 2 weeks on    76 mg or 0.38 mL   vitamin D3 (CHOLECALCIFEROL) 2000  units (50 mcg) tablet  Mother Yes No   Sig: Take 2,000 Units by mouth daily Crush and feed via j-tube @@ 0900      Facility-Administered Medications: None     Allergies   Allergies   Allergen Reactions     Dilantin [Phenytoin Sodium]      Valproic Acid      Toxicity c bone marrow suspension, elevated ammonia levels        Physical Exam   Vital Signs: Temp: 99.6  F (37.6  C) Temp src: Oral BP: 106/59 Pulse: 95 Heart Rate: 100 Resp: 20 SpO2: 95 % O2 Device: None (Room air)    Weight: 163 lbs 0 oz    General Appearance:  59-year-old male with sequelae of chronic illness including right-sided spastic paraplegia, G-tube, nonverbal status.  Appears comfortable, resting.  Does not appear toxic at this time.  Eyes: No scleral icterus or injection, though eyes do appear somewhat more sunken as compared to what I would consider times baseline.  HEENT: Dry oral mucosa  Respiratory: Breath sounds are clear bilaterally to auscultation.  Poor effort.  With positioning changes, patient demonstrates overt aspiration of minimal oral secretions  Cardiovascular: Heart rate currently in the low 100 range, regular rhythm.  No appreciable murmur.  GI: Abdomen soft, nontender.  G-tube in place without surrounding erythema.  Lymph/Hematologic: Bilateral pedal edema associated with immobility  Genitourinary: Slight skin tear at the distal end of the shaft of the penis under the glans penis on the right  Skin: Skin tear perianal with somewhat chronically erythematous/indurated perianal area suspicious for early pressure injury.  Musculoskeletal: Spastic paraplegia of right.  Neurologic: Sleepy, though awakens and gives a thumbs up.  Patient is less interactive than what I would consider his baseline.  Psychiatric: Unable to assess    Data   Data reviewed today: I reviewed all medications, new labs and imaging results over the last 24 hours. I personally reviewed no images or EKG's today.    Recent Labs   Lab 11/11/19  6165 11/10/19  8223    WBC  --  13.8*   HGB  --  11.2*   MCV  --  80   PLT  --  191   * 124*   POTASSIUM  --  4.3   CHLORIDE  --  92*   CO2  --  26   BUN  --  15   CR  --  0.73   ANIONGAP  --  6   STEVE  --  8.4*   GLC  --  97   ALBUMIN  --  3.1*   PROTTOTAL  --  7.5   BILITOTAL  --  0.3   ALKPHOS  --  93   ALT  --  25   AST  --  25

## 2019-11-11 NOTE — PROGRESS NOTES
RECEIVING UNIT ED HANDOFF REVIEW    ED Nurse Handoff Report was reviewed by: Rose Lopez RN on November 11, 2019 at 2:26 AM

## 2019-11-11 NOTE — CONSULTS
CHE  I: CHE was updated that patient would be ready for discharge today back home. CHE called HE to arrange a stretcher due to being lift dependent, non-verbal. Stretcher was arranged for 1800 on 11/11. CHE will update patient's mother. Patient's mother was agreeable to plan.    P: CHE will continue to follow and assist as needed.    GILMER Kaiser, LGSW  [phone]  [facility]

## 2019-11-11 NOTE — PROGRESS NOTES
[facility] Nurse Inpatient Skin Assessment     Assessment of:   Buttocks        Data:   Patient History:       Per MD notes, Keyon Farias is a 59 year old male who presents to the emergency department after he was noted to have a fever as well as lipsmacking behaviors at home, consistent with his frequent admissions for aspiration pneumonia versus pneumonitis events.  On arrival to the emergency department, he is actually afebrile.         Moisture Management:  Adult brief    Current Diet / Nutrition:       Orders Placed This Encounter        NPO for Medical/Clinical Reasons Except for: No Exceptions        Diet               Ricci Assessment and sub scores:   No data recorded       Labs:   Recent Labs   Lab Test 11/10/19  2238  10/26/19  1530  08/12/19  2200  02/07/17  0452   ALBUMIN 3.1*  --   --    < >  --    < >  --    HGB 11.2*   < >  --    < >  --    < > 9.4*   RBC 4.26*   < >  --    < >  --    < > 3.86*   WBC 13.8*   < >  --    < >  --    < > 11.7*      < >  --    < > 186   < > 363   INR  --   --   --   --   --   --  1.27*   A1C  --   --   --   --  6.1*   < >  --    CRP  --   --  87.3*  --   --    < >  --     < > = values in this interval not displayed.          Skin Assessment (location):  Bilateral buttocks  History:  Well-known to [facility] dept, has had fungal rashes and excoriation in the buttock area in the past    Skin: Left buttock intact. Right buttock with mildly red excoriation.      Drainage:  none    Pain:  No grimacing when examined          Intervention:     Patient's chart evaluated.      Cares performed:    Orders  Written    Supplies  floor stock    Discussed plan of care with mother and nurse          Assessment:      Mild excoriation right inner buttock. Pt non-verbal, known to [facility] dept. Mother is present, and states that she tries to keep area clean and dry. Will start Mepilex to protect.        Plan:   Nursing to notify the Provider(s) and re-consult the [facility]  Nurse if skin deteriorates.    Skin care plan to : RIght inner buttock: cleanse with Rita. Pat dry. Apply Cavilon no-sting barrier film to area. Apply Mepilex 4x4 . Change every 3 days.    WOC Nurse will return: weekly

## 2019-11-11 NOTE — ED PROVIDER NOTES
History     Chief Complaint:  Fever      The history is provided by the patient and a parent. The history is limited by the condition of the patient.      Keyon Farias is a 59 year old male with a history of TBI, asthma,  who presents with a fever via EMS. He denies pain in the emergency department. His mother reports that the patient developed a fever at 1930 today (11/10) that she gave him 2 tylenol after that to treat this with limited relief. She reports that his PCA stated that the patient was shaky earlier today. She states that the patient has had more fluid in his throat today. She states that the patient normally gets nutrition through his g-tube. She states that the patient has a condom catheter that causes some skin irritation and has a wrapping around this to stop him from pulling it off.    Allergies:  Phenytoin  Valproic acid  Scopolamine    Medications:    Ferrous sulfate  Protonix  Aspirin 81 mg  Synthroid  Reglan    Past Medical History:    TBI  Pancreatitis  Stroke  Seizures  Incontinence  Atelectasis  HLD  Bladder calculus  Panhypopituitarism  Asthma  Peptic disease  Sepsis  SIRS  Cardiac arrest  DVT  Aphasia  GERD  UTI  Ventricular tachyarrhythmia    Past Surgical History:    Gastrojejunostomy with jejunostomy tube placement  Appendectomy  EGD  Reconstructive facial surgery  Right hand repair  Tracheostomy placement    Family History:    Kidney cancer    Social History:  Smoking status: Former, 30 years ago  Alcohol use: No    Marital Status:  Single     Review of Systems   Unable to perform ROS: Patient nonverbal   Constitutional: Positive for fever.   Neurological: Positive for tremors.   All other systems reviewed and are negative.        Physical Exam     Patient Vitals for the past 24 hrs:   BP Temp Temp src Pulse Heart Rate Resp SpO2 Height Weight   11/11/19 0546 -- -- -- -- -- 16 97 % -- --   11/11/19 0254 102/42 96.1  F (35.6  C) Oral 91 -- 16 94 % -- --   11/11/19 0234 103/49 -- -- 90  -- 16 93 % -- 72.9 kg (160 lb 12.8 oz)   11/11/19 0200 106/56 -- -- 93 92 -- 95 % -- --   11/11/19 0100 117/64 -- -- 102 -- -- 94 % -- --   11/11/19 0038 106/59 -- -- 95 -- -- 95 % -- --   11/11/19 0000 117/61 -- -- 98 -- -- 99 % -- --   11/10/19 2300 120/81 -- -- -- -- -- -- -- --   11/10/19 2223 107/71 99.6  F (37.6  C) Oral -- 100 20 100 % 1.829 m (6') 73.9 kg (163 lb)       Physical Exam  Constitutional:  Appears well-developed and well-nourished. Cooperative. Alert, nonverbal, good eye contact.  HENT:   Head:    Atraumatic. Healed ostomy site in anterior neck.  Mouth/Throat:   Oropharynx is without erythema or exudate and mucous membranes are dry  Eyes:    Conjunctivae normal and EOM are normal.      Pupils are equal, round, and reactive to light.   Neck:    Normal range of motion. Neck supple.   Cardiovascular:  Normal rate, regular rhythm, normal heart sounds and radial and dorsalis pedis pulses are 2+ and symmetric.    Pulmonary/Chest:  Effort normal. Diminished breath sounds in both lung bases.   Abdominal:   Soft. Bowel sounds are normal.      No splenomegaly or hepatomegaly. No tenderness. No rebound. G-tube in left anterior abdomen. No    drainage or erythema around G-tube site.   Musculoskeletal:  Normal range of motion. No edema and no tenderness. Atrophy and 1+ edema in his bilateral lower extremities.  Neurological:  Alert. Normal strength. No cranial nerve deficit. Spontaneous movement of both upper extremities.  Skin:    Skin is warm and dry.   Psychiatric:   Normal mood and affect.     Emergency Department Course     Imaging:  Radiographic findings were communicated with the patient who voiced understanding of the findings.    XR Chest PA and LAT:   1. A few linear opacities are present in bilateral lung bases. A  similar appearance was present on the comparison study and therefore  these most likely relate to scarring and/or atelectasis. Pneumonia  cannot be excluded.  2. No other findings  suspicious for active cardiopulmonary disease.    Read as per radiology.      Laboratory:  ISTAT VBG: pH 7.45 (H) / PCO2 34(L) / PO2 44 / Bicarb 24 / O2 sat 82 / lactic acid 1.7    Influenza A/B antigen: A:Negative  B:Negative     Blood culture: pending    CBC: WBC: 13.8 (H), HGB: 11.2 (L), PLT: 191    CMP: Glucose 97, sodium: 124, chloride: 92 (L), calcium: 8.4 (L), albumin: 3.1 (L), o/w WNL (Creatinine: 0.73)    Procalcitonin: <0.05    Interventions:  2257: NS 1L IV Bolus     Emergency Department Course:  Nursing notes and vitals reviewed. (1528) I performed an exam of the patient as documented above.     IV inserted. Medicine administered as documented above. Blood drawn. This was sent to the lab for further testing, results above.    The patient was sent for a chest x-ray while in the emergency department, findings above.     0020 I rechecked the patient and discussed the results of his workup thus far. His mother     0152  I consulted with Dr. Villavicencio of the hospitalist services. They are in agreement to accept the patient for admission.    Findings and plan explained to the Patient and mother who consents to admission. Discussed the patient with Dr. Villavicencio, who will admit the patient to an observation bed for further monitoring, evaluation, and treatment.    Impression & Plan    Medical Decision Making:  Patient presents after developing fevers at home tonight. His mom says it was as high as 102*F. She got him Tylenol prior to coming here. The patient's mother also said that he has been seeming more confused and less responsive to commands today. There has been no vomiting or coughing. She hasn't identified anything that would be a source of fever. The patient is 100% tube fed. She said that his staff has not noticed any choking episodes or aspiration    On initial exam here the patient is afebrile. He has dry looking oral mucosa. He got a liter of fluid and laboratory studies were drawn. His serum lactate is  normal as is his white blood cell count. His laboratory testing has returned looking stable aside from a low sodium at 124. This is a new finding for the patient. It may be the explanation for his increased confusion, but wouldn't be for his fever. The patient's chest x-ray was read as showing no acute abnormality, no sign of pneumothorax or new aspiration.    The patient has a history of frequent hospitalizations for fever. Sometimes aspiration pneumonia was identified as the source but often times no source is determined. Tonight, his pro-calcitonin is normal, and I don't detect fever or leukocytosis. Vital signs have been acceptable and so I do not think the patient is currently septic. I did not start empiric antibiotics. We will bring him into the hospital for hydration and reassessment of the sodium. Dr. Villavicencio from the hospitalist service agreed to accept for admission. I discussed test results and treatment plan with the patient's mother and she is in understanding.      Diagnosis:    ICD-10-CM    1. Hyponatremia E87.1 Blood culture     Blood culture   2. Fever, unspecified fever cause R50.9        Disposition:  Admitted to observation    Keyon Workman  11/10/2019    EMERGENCY DEPARTMENT  Scribe Disclosure:  I, Keyon Workman, am serving as a scribe on 11/10/2019 at 10:28 PM to personally document services performed by Robbie Miller MD based on my observations and the provider's statements to me.          Robbie Miller MD  11/11/19 0843

## 2019-11-11 NOTE — CONSULTS
CLINICAL NUTRITION SERVICES  -  ASSESSMENT NOTE      Recommendations Ordered by Registered Dietitian (RD): Isosource 1.5 (formular sub for Jevity 1.5) at 100 mL/hr x 12 hours (7 am to 7 pm) to provide:  1800 kcal (25 kcal/kg), 82 g protein (1.1 g/kg), 18 g fiber, 912 mL H2O, 211 g CHO  Nutrisource Fiber 1 pkt daily= 15 kcal and 3 g fiber  Prosource 1 pkt BID = 80 kcal and 22 g protein  Total = 1900 kcal (26 kcal/kg), 104 g protein (1.4 g/kg), 21 g fiber   Flushes 60 mL every 4 hours   Malnutrition: % Weight Loss:  None noted  % Intake:  Unable to assess (patient unable to provide)  Subcutaneous Fat Loss:  Orbital region mild depletion and Upper arm region mild depletion (per 10/26 eval)  Muscle Loss:  Temporal region mild depletion (patient with hemiplegia, decreased muscle, no acute loss) (per 10/26 eval)  Fluid Retention:  None noted    Malnutrition Diagnosis: Patient does not meet two of the above criteria necessary for diagnosing malnutrition        REASON FOR ASSESSMENT  Keyon Farias is a 59 year old male seen by Registered Dietitian for Provider Order - Registered Dietitian to Assess and Order TF per Medical Nutrition Therapy Protocol      NUTRITION HISTORY  - Information obtained from Epic as patient non-verbal and very well known to our services due to frequent admissions.  Patient was last seen 10/26 and the following history was documented (currently in observation unit):   - Pt is well-known to clinical nutrition services from several past admissions. Pt is nonverbal at baseline.   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   - Fluid flush: H2O flushes 120 mL QID.     Per last RD note: Pt lives with his mother, she likes to keep the TF rate at no more than 100 ml/hr. He is fed during the day rather than at night so that he can be up in the chair to prevent aspiration      CURRENT NUTRITION ORDERS  Diet Order:     NPO   TF to resume today     Current Intake/Tolerance:  N/A      NUTRITION FOCUSED PHYSICAL ASSESSMENT FOR DIAGNOSING MALNUTRITION)  Completed:  Yes Full assessment (10/26) - Currently Observation                 Observed:    Muscle wasting (refer to documentation in Malnutrition section) and Subcutaneous fat loss (refer to documentation in Malnutrition section) (10/26)    Obtained from Chart/Interdisciplinary Team:  Pressure Injury - Tear and early pressure injury of sacrum     ANTHROPOMETRICS  Height: 6' 0\"  Weight: 72.9 kg (161#)(11/11)  Body mass index is 21.81 kg/m   Weight Status:  Normal BMI  IBW: 80.9 kg   % IBW: 90%  Weight History:   Wt Readings from Last 10 Encounters:   11/11/19 72.9 kg (160 lb 12.8 oz)   10/28/19 73.6 kg (162 lb 4.1 oz)   10/13/19 68.9 kg (152 lb)   10/10/19 69 kg (152 lb 1.9 oz)   09/27/19 73.9 kg (163 lb)   09/24/19 74 kg (163 lb 2.3 oz)   09/18/19 69.9 kg (154 lb 1.6 oz)   09/13/19 72 kg (158 lb 11.2 oz)   08/15/19 75.4 kg (166 lb 3.6 oz)   08/07/19 72.6 kg (160 lb)   No weight loss noted     LABS  Na 124 (L) - Not currently receiving IVF     MEDICATIONS  Neutraphos and Vitamin D3      ASSESSED NUTRITION NEEDS PER APPROVED PRACTICE GUIDELINES:    Dosing Weight 72.9 kg   Estimated Energy Needs: 1001-2072 kcals (25-30 Kcal/Kg)  Justification: maintenance and spastic hemiplegia  Estimated Protein Needs:  grams protein (1.2-1.5 g pro/Kg)  Justification: maintenance  Estimated Fluid Needs: 6569-7208 mL (1 mL/Kcal)  Justification: maintenance    MALNUTRITION:  % Weight Loss:  None noted  % " Intake:  Unable to assess (patient unable to provide)  Subcutaneous Fat Loss:  Orbital region mild depletion and Upper arm region mild depletion (per 10/26 eval)  Muscle Loss:  Temporal region mild depletion (patient with hemiplegia, decreased muscle, no acute loss) (per 10/26 eval)  Fluid Retention:  None noted    Malnutrition Diagnosis: Patient does not meet two of the above criteria necessary for diagnosing malnutrition    NUTRITION DIAGNOSIS:  Inadequate protein-energy intake related to NPO, TF to resume today as evidenced by meeting 0% needs     NUTRITION INTERVENTIONS  Recommendations / Nutrition Prescription  Isosource 1.5 (formulary sub for Jevity 1.5) at 100 mL/hr x 12 hours (7 am to 7 pm) to provide:  1800 kcal (25 kcal/kg), 82 g protein (1.1 g/kg), 18 g fiber, 912 mL H2O, 211 g CHO  Nutrisource Fiber 1 pkt daily= 15 kcal and 3 g fiber  Prosource 1 pkt BID = 80 kcal and 22 g protein  Total = 1900 kcal (26 kcal/kg), 104 g protein (1.4 g/kg), 21 g fiber   Flushes 60 mL every 4 hours     Implementation  Nutrition education: Not appropriate at this time due to patient condition  EN Composition, EN Schedule, and Feeding Tube Flush:  Orders written for above TF regimen.    Nutrition Goals  TF regimen will meet % needs     MONITORING AND EVALUATION:  Progress towards goals will be monitored and evaluated per protocol and Practice Guidelines    Swati Parrish RD, LD, CNSC   Clinical Dietitian - [facility]

## 2019-11-11 NOTE — PROVIDER NOTIFICATION
Dr Villavicencio called regarding repeat Na of 124; MD ordered LR bolus of 1 L with repeat Na at 1000.

## 2019-11-12 NOTE — PLAN OF CARE
Pt is non-verbal, HECTOR orientation. Pleasant. G-tube clamped. Strict NPO. Assist of 2. Condom catheter is patent with clear yellow urine. T+R Q2H, total cares. Sodium 129. Handoff report given to Neil who transported pt back to home with scopolamine pills. Writer notified mother Savannah of pt departure.

## 2019-11-12 NOTE — PLAN OF CARE
OBSERVATION GOALS  -diagnostic tests and consults completed and resulted: N/A    -vital signs normal or at patient baseline: Met    -serum sodium normalized: Na+ drawned, awaiting results

## 2019-11-13 ENCOUNTER — TELEPHONE (OUTPATIENT)
Dept: PULMONOLOGY | Facility: CLINIC | Age: 59
End: 2019-11-13

## 2019-11-13 DIAGNOSIS — J69.0 ASPIRATION PNEUMONIA (H): Primary | ICD-10-CM

## 2019-11-13 RX ORDER — SCOPOLAMINE HYDROBROMIDE
POWDER (GRAM) MISCELLANEOUS
Qty: 90 BOTTLE | Refills: 11 | Status: SHIPPED | OUTPATIENT
Start: 2019-11-13 | End: 2020-11-25

## 2019-11-13 NOTE — TELEPHONE ENCOUNTER
Confirmed dosing with pt's mother, Justyna pt is taking 0.8mg tid.  Reviewed with Dr. Bermudez who authorized refill at this dose.

## 2019-11-13 NOTE — TELEPHONE ENCOUNTER
Health Call Center    Phone Message    May a detailed message be left on voicemail: yes    Reason for Call: Medication Refill Request    Has the patient contacted the pharmacy for the refill? Yes   Name of medication being requested: Scopolamine  Provider who prescribed the medication: Dr. Perlman  Pharmacy: [facility] pharmacy  Date medication is needed: ASAP-     Patient is currently dosed as:   6 tablets of 0.4mg a day.  2 at a time -3 times a day.  Action Taken: Message routed to:  [facility] ([facility]

## 2019-11-14 NOTE — PLAN OF CARE
Problem: Goal Outcome Summary  Goal: Goal Outcome Summary  Outcome: No Change  [id number]  Neuro: Awakens easily but falls asleep easily. Hand goes to face, abdomen, so far only itches and does not reach for trach. Moves L side to command but not right.   Lungs: rare cough, creamy slight green secretions.   CV: weaned Levophed off, followed by Vasopressin keeping MAP > 65.  GI: had cristel colored soft stool upon exit from [facility].  J port remains plugged.  U/O low: BIPIN PRICE  notified  I: Antibiotics followed by albumin. E: U/O increased to 30cc/hr at 1400.  Potassium replaced, Mg replaced. No recheck at [facility]  Hgb 6.0, redrawn wasting 5-6 cc each time but ? still diluted specimans. I: 1 unit PRBC's given  Report called to Lorena on 4A.         normal (ped)...

## 2019-11-15 ENCOUNTER — HOSPITAL ENCOUNTER (EMERGENCY)
Facility: CLINIC | Age: 59
Discharge: HOME | End: 2019-11-16
Attending: EMERGENCY MEDICINE | Admitting: EMERGENCY MEDICINE
Payer: MEDICARE

## 2019-11-15 DIAGNOSIS — E87.1 HYPONATREMIA: ICD-10-CM

## 2019-11-15 LAB
ANION GAP SERPL CALCULATED.3IONS-SCNC: 4 MMOL/L (ref 3–14)
BUN SERPL-MCNC: 13 MG/DL (ref 7–30)
CALCIUM SERPL-MCNC: 8.1 MG/DL (ref 8.5–10.1)
CHLORIDE SERPL-SCNC: 91 MMOL/L (ref 94–109)
CO2 SERPL-SCNC: 29 MMOL/L (ref 20–32)
CREAT SERPL-MCNC: 0.65 MG/DL (ref 0.66–1.25)
GFR SERPL CREATININE-BSD FRML MDRD: >90 ML/MIN/{1.73_M2}
GLUCOSE SERPL-MCNC: 169 MG/DL (ref 70–99)
POTASSIUM SERPL-SCNC: 4.1 MMOL/L (ref 3.4–5.3)
SODIUM SERPL-SCNC: 124 MMOL/L (ref 133–144)

## 2019-11-15 PROCEDURE — 80048 BASIC METABOLIC PNL TOTAL CA: CPT | Performed by: EMERGENCY MEDICINE

## 2019-11-15 PROCEDURE — 25800030 ZZH RX IP 258 OP 636: Performed by: EMERGENCY MEDICINE

## 2019-11-15 PROCEDURE — 96360 HYDRATION IV INFUSION INIT: CPT

## 2019-11-15 PROCEDURE — 99284 EMERGENCY DEPT VISIT MOD MDM: CPT | Mod: 25

## 2019-11-15 PROCEDURE — 96361 HYDRATE IV INFUSION ADD-ON: CPT

## 2019-11-15 RX ORDER — SODIUM BICARBONATE 650 MG/1
650 TABLET ORAL DAILY
Qty: 30 TABLET | Refills: 0 | Status: ON HOLD | OUTPATIENT
Start: 2019-11-15 | End: 2019-12-25

## 2019-11-15 RX ADMIN — SODIUM CHLORIDE 1000 ML: 9 INJECTION, SOLUTION INTRAVENOUS at 23:36

## 2019-11-15 RX ADMIN — SODIUM CHLORIDE 1000 ML: 9 INJECTION, SOLUTION INTRAVENOUS at 22:02

## 2019-11-15 ASSESSMENT — MIFFLIN-ST. JEOR: SCORE: 1588.76

## 2019-11-15 NOTE — ED AVS SNAPSHOT
Emergency Department  [address] [place] [address]  Phone:  [phone]  Fax:  [phone]                                    Keyon Farias   MRN: [id number]    Department:   Emergency Department   Date of Visit:  11/15/2019           After Visit Summary Signature Page    I have received my discharge instructions, and my questions have been answered. I have discussed any challenges I see with this plan with the nurse or doctor.    ..........................................................................................................................................  Patient/Patient Representative Signature      ..........................................................................................................................................  Patient Representative Print Name and Relationship to Patient    ..................................................               ................................................  Date                                   Time    ..........................................................................................................................................  Reviewed by Signature/Title    ...................................................              ..............................................  Date                                               Time          22EPIC Rev 08/18

## 2019-11-16 VITALS
WEIGHT: 160 LBS | OXYGEN SATURATION: 96 % | SYSTOLIC BLOOD PRESSURE: 129 MMHG | DIASTOLIC BLOOD PRESSURE: 63 MMHG | RESPIRATION RATE: 18 BRPM | HEIGHT: 72 IN | TEMPERATURE: 97.9 F | BODY MASS INDEX: 21.67 KG/M2

## 2019-11-16 NOTE — ED NOTES
"Writer called and updated guardian on POC. Pt states that \"Dr. Robert just called to speak with her as well\". Writer reviewed with guardian that she will need to contact myron farrell to speak with her / regarding any updates in their home care treatment plan. Guardian verbalizes understanding by stating \"I will call them in the morning\".   "

## 2019-11-16 NOTE — ED NOTES
"[facility]  ED Nurse Handoff Report    ED Chief complaint: Abnormal Labs      ED Diagnosis:   Final diagnoses:   None       Code Status: See H&P    Allergies:   Allergies   Allergen Reactions     Dilantin [Phenytoin Sodium]      Valproic Acid      Toxicity c bone marrow suspension, elevated ammonia levels        Activity level - Baseline/Home:  Total Care  Activity Level - Current:   Total Care    Patient's Preferred language: English, patient has limited speech   Needed?: No    Isolation: Yes  Infection: Not Applicable  Bariatric?: No    Vital Signs:   Vitals:    11/15/19 2135   BP: 117/58   Resp: 18   Temp: 97.9  F (36.6  C)   TempSrc: Temporal   SpO2: 96%   Weight: 72.6 kg (160 lb)   Height: 1.829 m (6')       Cardiac Rhythm: ,        Pain level:      Is this patient confused?: Yes   Does this patient have a guardian?  Yes         If yes, is there guardianship documents in the Epic \"Code/ACP\" activity?  Yes         Guardian Notified?  Yes  [place] - Suicide Severity Rating Scale Completed?  No, secondary to non-verbal  If yes, what color did the patient score?  n/a    Patient Report: Initial Complaint: Patient sent in for sodium level of 122  Focused Assessment: Unable to assess orientation, limited speech with moaning. Able to follow some commands. Right sided hemiplegia. Bruises and scarring from previous hospital stays. Condom catheter in place. G-tube in place.  Tests Performed: labs  Abnormal Results:   Abnormal Labs Reviewed   BASIC METABOLIC PANEL - Abnormal; Notable for the following components:       Result Value    Sodium 124 (*)     Chloride 91 (*)     Glucose 169 (*)     Creatinine 0.65 (*)     Calcium 8.1 (*)     All other components within normal limits       Treatments provided: 1000cc bolus    Family Comments: Savannah sommer updated via phone    OBS brochure/video discussed/provided to patient/family: N/A, patient not appropriate              Name of person given " brochure if not patient: n/a              Relationship to patient: n/a    ED Medications:   Medications   0.9% sodium chloride BOLUS (1,000 mLs Intravenous New Bag 11/15/19 9871)       Drips infusing?:  No    For the majority of the shift this patient was Yellow.   Interventions performed were frequent rounding, reorientation.    Severe Sepsis OR Septic Shock Diagnosis Present: No    To be done/followed up on inpatient unit:  continue to monitor    ED NURSE PHONE NUMBER: *[phone]

## 2019-11-16 NOTE — ED PROVIDER NOTES
History     Chief Complaint:  Abnormal Labs    The history is limited by the condition of the patient.      Keyon Farias is a 59 year old male with an extensive medical history that includes cardiac arrest, stroke, DVT, TBI, seizures, septic shock, and encephalopathy who presents to the emergency department for evaluation of hyponatremia. The patient had a visit with his primary Dr. Gomez who had labs drawn. His lab showed hyponatremia, prompting his visit.    [facility]   Value   Sodium 122 Panic     Comment: Critical Result Low   Potassium 4.5    Chloride 86Low     CO2 25    AnGap 12    Glucose 155High     BUN 13    Creatinine 0.70    Calcium 8.8    eGFR,  >120    Comment: Calculated using CKD-EPI equation   eGFR, Non-African American 105      Allergies:  Dilantin [Phenytoin Sodium]  Valproic Acid    Medications:    Mucomyst  Albuterol  Aspirin 81 mg  Briviact  Tegretol  Iron  Cortef  Duo neb  Synthroid  Melatonin  Reglan  Protonix  Depotestosterone  Vitamin D3  Sodium bicarbonate    Past Medical History:    Aphasia  TBI  DVT  GERD  Panhypopituitarism  Pneumonia  Seizures  Septic shock  Spastic hemiplegia  Thyroid disease  Tracheostomy care  Stroke  UTI  Ventricular fibrillation  Ventricular tachyarrhythmia  Seizures   Pancreatitis  Asthma  Cardiac arrest  SIRS  Encephalopathy    Past Surgical History:    GI endoscopy  EGD x4  Head and neck surgery  Jejunostomy tube change x4  Picc exchange  Appendectomy  Gastrotomy  Ortho surgery  Tracheostomy x2  Vascular surgery    Family History:    Cancer    Social History:  The patient was accompanied to the ED by his mother.  Smoking Status: Former  Smokeless Tobacco: Never  Alcohol Use: No  Drug Use: No  Marital Status:  Single      Review of Systems   Unable to perform ROS: Patient nonverbal       Physical Exam   Vitals:  Patient Vitals for the past 24 hrs:   BP Temp Temp src Heart Rate Resp SpO2 Height Weight   11/15/19 2135 117/58  97.9  F (36.6  C) Temporal 76 18 96 % 1.829 m (6') 72.6 kg (160 lb)     Physical Exam  General:  Middle aged gentleman with TBI well known to myself. Smiling and waving in no sign of acute distress.     Eye:  Pupils are equal, round, and reactive.  Extraocular movements intact.    ENT:  No rhinorrhea.  Moist mucus membranes.  Normal tongue and tonsil.    Cardiac:  Regular rate and rhythm.  No murmurs, gallops, or rubs.    Pulmonary:  Clear to auscultation bilaterally.  No wheezes, rales, or rhonchi.    Abdomen:  Positive bowel sounds.  Abdomen is soft and non-distended, without focal tenderness.    Musculoskeletal:  Normal movement of all extremities without evidence for deficit.    Skin:  Warm and dry without rashes.    Neurologic:  Non-focal exam without asymmetric weakness or numbness.     Psychiatric:  Alert and interactive, at baseline.    Emergency Department Course     Laboratory:  BMP: Sodium 124 (L) Chloride 91 (L) Glucose 169 (H) Creatinine 0.65 (L) Calcium 8.1 (L) o/w AWNL    Interventions:  2202 NS, 1 L, IV bolus    Emergency Department Course:  Nursing notes and vitals reviewed. 2158 I performed an exam of the patient as documented above.     IV inserted. Medicine administered as documented above. Blood drawn. This was sent to the lab for further testing, results above.    2310 I rechecked the patient and discussed the results of his workup thus far.     2325 I called Savannah, the mother of the patient, and discussed the work up of the patient and the plan for his discharge. She is supportive of the plan for discharge.    Findings and plan explained to the mother. Patient discharged home with instructions regarding supportive care, medications, and reasons to return. The importance of close follow-up was reviewed. Patient was prescribed sodium bicarbonate.    I personally reviewed the laboratory results with the mother and answered all related questions prior to discharge.    Impression & Plan       Medical Decision Making:  This unfortunate 59-year-old, well-known to myself and to this hospital for frequent admissions, presents to us because of a low sodium after blood draw today.  He was recently admitted for aspiration pneumonia and for hyponatremia with levels in the 120s.  It appears as though his baseline is around 129-131.  He was essentially asymptomatic today according to his mother as I spoke with her on the phone.  However, when his level returned at 122, they advised that he be transferred to the emergency department.    On my exam, Keyon appears as he always does, suffering from a significant TBI but otherwise awake and alert and interactive with me.  His physical exam is otherwise reassuring and his vital signs are normal.  I rechecked a sodium level I find it to be 124.  This is a drop from 129 when he left the hospital a few days ago.  He had been discharged on sodium bicarbonate tablets.  He gets all of his feedings through a G-tube, but it was thought that he was becoming hyponatremic because of insensate losses from excessive heat in his room.  Considering this is a more sudden drop of his sodium and he is otherwise asymptomatic, I feel comfortable giving him 2 L of fluid here which will bring his sodium back up into the high 120s.  His mother does not want him admitted to the hospital unless it is absolutely necessary and I do not believe that Keyon is in grave danger of suffering a seizure or having other more serious pathology here today.  We will reinitiate his sodium bicarbonate tablets.  It was advised that he have his sodium rechecked on Monday and that he could certainly have his tube feedings changed to decrease the amount of free water that he is getting for to change his diet to some degree to make sure that his sodium losses are not such an issue.  Mother is comfortable with this plan and will follow up with the primary doctor early next week.  Otherwise, they were advised to  return to us at any point if there are other emergent concerns.    Diagnosis:    ICD-10-CM    1. Hyponatremia E87.1        Disposition:  discharged to home    Discharge Medications        sodium bicarbonate 650 MG tablet  Used for:  Hyponatremia      Dose:  650 mg  Take 1 tablet (650 mg) by mouth daily  Quantity:  30 tablet  Refills:  0          Steve DYKES, yobani serving as a scribe on 11/15/2019 at 10:11 PM to personally document services performed by Trierweiler, Chad A, MD based on my observations and the provider's statements to me.     Steve Hernández  11/15/2019    EMERGENCY DEPARTMENT       Trierweiler, Chad A, MD  11/16/19 4767

## 2019-11-16 NOTE — ED NOTES
Bed: ED02  Expected date:   Expected time:   Means of arrival:   Comments:  [facility] - 58M - abnormal lab values - ETA 6min

## 2019-11-16 NOTE — ED NOTES
Discharge instructions reviewed with guardian. Informed her that prescription and paperwork is being sent with pt via HE. Guardian states no other questions at this time. Guardian is aware pt is transporting home via HE.

## 2019-11-16 NOTE — ED TRIAGE NOTES
"Reports to ED via EMS for evaluation of abnormal labs. Pt's mom got a call from his PCP saying his labs were off, pt's mom reports his sodium was 122. Pt's mom requesting \"pt get rehydrated, and sodium tablet prescription\".     Pt's mom requesting social work to be involved to see if home care that draws pt's blood can \"put in a line and rehydrate him at home\".   "

## 2019-12-16 ENCOUNTER — APPOINTMENT (OUTPATIENT)
Dept: GENERAL RADIOLOGY | Facility: CLINIC | Age: 59
DRG: 178 | End: 2019-12-16
Attending: EMERGENCY MEDICINE
Payer: MEDICARE

## 2019-12-16 ENCOUNTER — HOSPITAL ENCOUNTER (INPATIENT)
Facility: CLINIC | Age: 59
LOS: 3 days | Discharge: HOME HEALTH | DRG: 178 | End: 2019-12-19
Attending: EMERGENCY MEDICINE | Admitting: HOSPITALIST
Payer: MEDICARE

## 2019-12-16 DIAGNOSIS — J18.9 PNEUMONIA OF BOTH LOWER LOBES DUE TO INFECTIOUS ORGANISM: ICD-10-CM

## 2019-12-16 DIAGNOSIS — T17.908A: Primary | ICD-10-CM

## 2019-12-16 DIAGNOSIS — L89.150 PRESSURE INJURY OF SACRAL REGION, UNSTAGEABLE (H): ICD-10-CM

## 2019-12-16 LAB
ALBUMIN SERPL-MCNC: 3.1 G/DL (ref 3.4–5)
ALP SERPL-CCNC: 108 U/L (ref 40–150)
ALT SERPL W P-5'-P-CCNC: 45 U/L (ref 0–70)
ANION GAP SERPL CALCULATED.3IONS-SCNC: 8 MMOL/L (ref 3–14)
AST SERPL W P-5'-P-CCNC: 43 U/L (ref 0–45)
BASOPHILS # BLD AUTO: 0.1 10E9/L (ref 0–0.2)
BASOPHILS NFR BLD AUTO: 0.9 %
BILIRUB SERPL-MCNC: 0.3 MG/DL (ref 0.2–1.3)
BUN SERPL-MCNC: 20 MG/DL (ref 7–30)
CALCIUM SERPL-MCNC: 8.7 MG/DL (ref 8.5–10.1)
CHLORIDE SERPL-SCNC: 106 MMOL/L (ref 94–109)
CO2 BLDCOV-SCNC: 28 MMOL/L (ref 21–28)
CO2 SERPL-SCNC: 28 MMOL/L (ref 20–32)
CREAT SERPL-MCNC: 0.88 MG/DL (ref 0.66–1.25)
DIFFERENTIAL METHOD BLD: ABNORMAL
EOSINOPHIL # BLD AUTO: 0.5 10E9/L (ref 0–0.7)
EOSINOPHIL NFR BLD AUTO: 4.4 %
ERYTHROCYTE [DISTWIDTH] IN BLOOD BY AUTOMATED COUNT: 16.5 % (ref 10–15)
FLUAV+FLUBV AG SPEC QL: NEGATIVE
FLUAV+FLUBV AG SPEC QL: NEGATIVE
GFR SERPL CREATININE-BSD FRML MDRD: >90 ML/MIN/{1.73_M2}
GLUCOSE SERPL-MCNC: 207 MG/DL (ref 70–99)
HCT VFR BLD AUTO: 40.1 % (ref 40–53)
HGB BLD-MCNC: 12.7 G/DL (ref 13.3–17.7)
IMM GRANULOCYTES # BLD: 0 10E9/L (ref 0–0.4)
IMM GRANULOCYTES NFR BLD: 0.2 %
LACTATE BLD-SCNC: 1.8 MMOL/L (ref 0.7–2.1)
LYMPHOCYTES # BLD AUTO: 3.9 10E9/L (ref 0.8–5.3)
LYMPHOCYTES NFR BLD AUTO: 38.5 %
MCH RBC QN AUTO: 26.1 PG (ref 26.5–33)
MCHC RBC AUTO-ENTMCNC: 31.7 G/DL (ref 31.5–36.5)
MCV RBC AUTO: 82 FL (ref 78–100)
MONOCYTES # BLD AUTO: 1.4 10E9/L (ref 0–1.3)
MONOCYTES NFR BLD AUTO: 13.7 %
NEUTROPHILS # BLD AUTO: 4.3 10E9/L (ref 1.6–8.3)
NEUTROPHILS NFR BLD AUTO: 42.3 %
NRBC # BLD AUTO: 0 10*3/UL
NRBC BLD AUTO-RTO: 0 /100
PCO2 BLDV: 46 MM HG (ref 40–50)
PH BLDV: 7.39 PH (ref 7.32–7.43)
PLATELET # BLD AUTO: 265 10E9/L (ref 150–450)
PO2 BLDV: 37 MM HG (ref 25–47)
POTASSIUM SERPL-SCNC: 4.4 MMOL/L (ref 3.4–5.3)
PROT SERPL-MCNC: 8 G/DL (ref 6.8–8.8)
RBC # BLD AUTO: 4.87 10E12/L (ref 4.4–5.9)
SAO2 % BLDV FROM PO2: 70 %
SODIUM SERPL-SCNC: 142 MMOL/L (ref 133–144)
SPECIMEN SOURCE: NORMAL
WBC # BLD AUTO: 10.1 10E9/L (ref 4–11)

## 2019-12-16 PROCEDURE — 87040 BLOOD CULTURE FOR BACTERIA: CPT | Performed by: EMERGENCY MEDICINE

## 2019-12-16 PROCEDURE — 83605 ASSAY OF LACTIC ACID: CPT

## 2019-12-16 PROCEDURE — 25800030 ZZH RX IP 258 OP 636: Performed by: EMERGENCY MEDICINE

## 2019-12-16 PROCEDURE — 85025 COMPLETE CBC W/AUTO DIFF WBC: CPT | Performed by: EMERGENCY MEDICINE

## 2019-12-16 PROCEDURE — 71046 X-RAY EXAM CHEST 2 VIEWS: CPT

## 2019-12-16 PROCEDURE — 99223 1ST HOSP IP/OBS HIGH 75: CPT | Mod: AI | Performed by: HOSPITALIST

## 2019-12-16 PROCEDURE — 25000132 ZZH RX MED GY IP 250 OP 250 PS 637: Mod: GY | Performed by: HOSPITALIST

## 2019-12-16 PROCEDURE — 87086 URINE CULTURE/COLONY COUNT: CPT | Performed by: EMERGENCY MEDICINE

## 2019-12-16 PROCEDURE — 81001 URINALYSIS AUTO W/SCOPE: CPT | Performed by: EMERGENCY MEDICINE

## 2019-12-16 PROCEDURE — 12000000 ZZH R&B MED SURG/OB

## 2019-12-16 PROCEDURE — 25800030 ZZH RX IP 258 OP 636: Performed by: HOSPITALIST

## 2019-12-16 PROCEDURE — 87804 INFLUENZA ASSAY W/OPTIC: CPT | Performed by: EMERGENCY MEDICINE

## 2019-12-16 PROCEDURE — 25000128 H RX IP 250 OP 636: Performed by: EMERGENCY MEDICINE

## 2019-12-16 PROCEDURE — 96374 THER/PROPH/DIAG INJ IV PUSH: CPT

## 2019-12-16 PROCEDURE — 82803 BLOOD GASES ANY COMBINATION: CPT

## 2019-12-16 PROCEDURE — 80053 COMPREHEN METABOLIC PANEL: CPT | Performed by: EMERGENCY MEDICINE

## 2019-12-16 PROCEDURE — 99285 EMERGENCY DEPT VISIT HI MDM: CPT | Mod: 25

## 2019-12-16 RX ORDER — ACETAMINOPHEN 650 MG/1
650 SUPPOSITORY RECTAL EVERY 4 HOURS PRN
Status: DISCONTINUED | OUTPATIENT
Start: 2019-12-16 | End: 2019-12-19 | Stop reason: HOSPADM

## 2019-12-16 RX ORDER — ACETAMINOPHEN 325 MG/1
650 TABLET ORAL EVERY 4 HOURS PRN
Status: DISCONTINUED | OUTPATIENT
Start: 2019-12-16 | End: 2019-12-19 | Stop reason: HOSPADM

## 2019-12-16 RX ORDER — ALBUTEROL SULFATE 5 MG/ML
2.5 SOLUTION RESPIRATORY (INHALATION)
Status: DISCONTINUED | OUTPATIENT
Start: 2019-12-16 | End: 2019-12-17 | Stop reason: CLARIF

## 2019-12-16 RX ORDER — SODIUM CHLORIDE 9 MG/ML
INJECTION, SOLUTION INTRAVENOUS CONTINUOUS
Status: DISCONTINUED | OUTPATIENT
Start: 2019-12-16 | End: 2019-12-19 | Stop reason: HOSPADM

## 2019-12-16 RX ORDER — AMOXICILLIN 250 MG
2 CAPSULE ORAL 2 TIMES DAILY PRN
Status: DISCONTINUED | OUTPATIENT
Start: 2019-12-16 | End: 2019-12-19 | Stop reason: HOSPADM

## 2019-12-16 RX ORDER — BISACODYL 10 MG
10 SUPPOSITORY, RECTAL RECTAL DAILY PRN
Status: DISCONTINUED | OUTPATIENT
Start: 2019-12-16 | End: 2019-12-19 | Stop reason: HOSPADM

## 2019-12-16 RX ORDER — PIPERACILLIN SODIUM, TAZOBACTAM SODIUM 4; .5 G/20ML; G/20ML
4.5 INJECTION, POWDER, LYOPHILIZED, FOR SOLUTION INTRAVENOUS EVERY 6 HOURS
Status: DISCONTINUED | OUTPATIENT
Start: 2019-12-17 | End: 2019-12-19 | Stop reason: HOSPADM

## 2019-12-16 RX ORDER — NALOXONE HYDROCHLORIDE 0.4 MG/ML
.1-.4 INJECTION, SOLUTION INTRAMUSCULAR; INTRAVENOUS; SUBCUTANEOUS
Status: DISCONTINUED | OUTPATIENT
Start: 2019-12-16 | End: 2019-12-19 | Stop reason: HOSPADM

## 2019-12-16 RX ORDER — METOCLOPRAMIDE HYDROCHLORIDE 5 MG/5ML
5 SOLUTION ORAL 2 TIMES DAILY
Status: DISCONTINUED | OUTPATIENT
Start: 2019-12-16 | End: 2019-12-19 | Stop reason: HOSPADM

## 2019-12-16 RX ORDER — POTASSIUM CL/LIDO/0.9 % NACL 10MEQ/0.1L
10 INTRAVENOUS SOLUTION, PIGGYBACK (ML) INTRAVENOUS
Status: DISCONTINUED | OUTPATIENT
Start: 2019-12-16 | End: 2019-12-19 | Stop reason: HOSPADM

## 2019-12-16 RX ORDER — POTASSIUM CHLORIDE 1500 MG/1
20-40 TABLET, EXTENDED RELEASE ORAL
Status: DISCONTINUED | OUTPATIENT
Start: 2019-12-16 | End: 2019-12-19 | Stop reason: HOSPADM

## 2019-12-16 RX ORDER — MAGNESIUM SULFATE HEPTAHYDRATE 40 MG/ML
4 INJECTION, SOLUTION INTRAVENOUS EVERY 4 HOURS PRN
Status: DISCONTINUED | OUTPATIENT
Start: 2019-12-16 | End: 2019-12-19 | Stop reason: HOSPADM

## 2019-12-16 RX ORDER — ONDANSETRON 2 MG/ML
4 INJECTION INTRAMUSCULAR; INTRAVENOUS EVERY 6 HOURS PRN
Status: DISCONTINUED | OUTPATIENT
Start: 2019-12-16 | End: 2019-12-19 | Stop reason: HOSPADM

## 2019-12-16 RX ORDER — ACETYLCYSTEINE 200 MG/ML
2 SOLUTION ORAL; RESPIRATORY (INHALATION) 4 TIMES DAILY
Status: DISCONTINUED | OUTPATIENT
Start: 2019-12-16 | End: 2019-12-19 | Stop reason: HOSPADM

## 2019-12-16 RX ORDER — ASPIRIN 81 MG/1
81 TABLET, CHEWABLE ORAL DAILY
Status: DISCONTINUED | OUTPATIENT
Start: 2019-12-17 | End: 2019-12-19 | Stop reason: HOSPADM

## 2019-12-16 RX ORDER — POTASSIUM CHLORIDE 1.5 G/1.58G
20-40 POWDER, FOR SOLUTION ORAL
Status: DISCONTINUED | OUTPATIENT
Start: 2019-12-16 | End: 2019-12-19 | Stop reason: HOSPADM

## 2019-12-16 RX ORDER — POTASSIUM CHLORIDE 29.8 MG/ML
20 INJECTION INTRAVENOUS
Status: DISCONTINUED | OUTPATIENT
Start: 2019-12-16 | End: 2019-12-19 | Stop reason: HOSPADM

## 2019-12-16 RX ORDER — SODIUM BICARBONATE 650 MG/1
650 TABLET ORAL DAILY
Status: DISCONTINUED | OUTPATIENT
Start: 2019-12-17 | End: 2019-12-19 | Stop reason: HOSPADM

## 2019-12-16 RX ORDER — HYDROCORTISONE 20 MG/1
20 TABLET ORAL EVERY MORNING
Status: DISCONTINUED | OUTPATIENT
Start: 2019-12-17 | End: 2019-12-19 | Stop reason: HOSPADM

## 2019-12-16 RX ORDER — VITAMIN B COMPLEX
2000 TABLET ORAL DAILY
Status: DISCONTINUED | OUTPATIENT
Start: 2019-12-17 | End: 2019-12-19 | Stop reason: HOSPADM

## 2019-12-16 RX ORDER — LANOLIN ALCOHOL/MO/W.PET/CERES
6 CREAM (GRAM) TOPICAL AT BEDTIME
Status: DISCONTINUED | OUTPATIENT
Start: 2019-12-16 | End: 2019-12-19 | Stop reason: HOSPADM

## 2019-12-16 RX ORDER — POTASSIUM CHLORIDE 7.45 MG/ML
10 INJECTION INTRAVENOUS
Status: DISCONTINUED | OUTPATIENT
Start: 2019-12-16 | End: 2019-12-19 | Stop reason: HOSPADM

## 2019-12-16 RX ORDER — ONDANSETRON 4 MG/1
4 TABLET, ORALLY DISINTEGRATING ORAL EVERY 6 HOURS PRN
Status: DISCONTINUED | OUTPATIENT
Start: 2019-12-16 | End: 2019-12-19 | Stop reason: HOSPADM

## 2019-12-16 RX ORDER — HYDROCORTISONE 10 MG/1
10 TABLET ORAL
Status: DISCONTINUED | OUTPATIENT
Start: 2019-12-17 | End: 2019-12-19 | Stop reason: HOSPADM

## 2019-12-16 RX ORDER — POLYETHYLENE GLYCOL 3350 17 G/17G
17 POWDER, FOR SOLUTION ORAL DAILY PRN
Status: DISCONTINUED | OUTPATIENT
Start: 2019-12-16 | End: 2019-12-19 | Stop reason: HOSPADM

## 2019-12-16 RX ORDER — GUAR GUM
1 PACKET (EA) ORAL DAILY
Status: DISCONTINUED | OUTPATIENT
Start: 2019-12-16 | End: 2019-12-19 | Stop reason: HOSPADM

## 2019-12-16 RX ORDER — CARBAMAZEPINE 100 MG/5ML
150 SUSPENSION ORAL EVERY 6 HOURS
Status: DISCONTINUED | OUTPATIENT
Start: 2019-12-17 | End: 2019-12-19 | Stop reason: HOSPADM

## 2019-12-16 RX ORDER — LEVOTHYROXINE SODIUM 150 UG/1
150 TABLET ORAL EVERY MORNING
Status: DISCONTINUED | OUTPATIENT
Start: 2019-12-17 | End: 2019-12-19

## 2019-12-16 RX ORDER — SCOPOLAMINE HYDROBROMIDE
0.8 POWDER (GRAM) MISCELLANEOUS 3 TIMES DAILY
Status: DISCONTINUED | OUTPATIENT
Start: 2019-12-17 | End: 2019-12-19 | Stop reason: HOSPADM

## 2019-12-16 RX ORDER — AMOXICILLIN 250 MG
1 CAPSULE ORAL 2 TIMES DAILY PRN
Status: DISCONTINUED | OUTPATIENT
Start: 2019-12-16 | End: 2019-12-19 | Stop reason: HOSPADM

## 2019-12-16 RX ORDER — PIPERACILLIN SODIUM, TAZOBACTAM SODIUM 4; .5 G/20ML; G/20ML
4.5 INJECTION, POWDER, LYOPHILIZED, FOR SOLUTION INTRAVENOUS ONCE
Status: COMPLETED | OUTPATIENT
Start: 2019-12-16 | End: 2019-12-16

## 2019-12-16 RX ORDER — LIDOCAINE 40 MG/G
CREAM TOPICAL
Status: DISCONTINUED | OUTPATIENT
Start: 2019-12-16 | End: 2019-12-19 | Stop reason: HOSPADM

## 2019-12-16 RX ADMIN — SODIUM CHLORIDE: 9 INJECTION, SOLUTION INTRAVENOUS at 22:08

## 2019-12-16 RX ADMIN — VANCOMYCIN HYDROCHLORIDE 1750 MG: 5 INJECTION, POWDER, LYOPHILIZED, FOR SOLUTION INTRAVENOUS at 22:08

## 2019-12-16 RX ADMIN — SODIUM CHLORIDE 2000 ML: 9 INJECTION, SOLUTION INTRAVENOUS at 17:24

## 2019-12-16 RX ADMIN — BRIVARACETAM 100 MG: 10 SOLUTION ORAL at 23:16

## 2019-12-16 RX ADMIN — PIPERACILLIN SODIUM AND TAZOBACTAM SODIUM 4.5 G: 4; .5 INJECTION, POWDER, LYOPHILIZED, FOR SOLUTION INTRAVENOUS at 19:13

## 2019-12-16 RX ADMIN — METOCLOPRAMIDE 5 MG: 5 SOLUTION ORAL at 23:16

## 2019-12-16 ASSESSMENT — ENCOUNTER SYMPTOMS
COUGH: 1
FEVER: 1
SHORTNESS OF BREATH: 1
ABDOMINAL PAIN: 0
WEAKNESS: 1
VOMITING: 0
MYALGIAS: 0
DIARRHEA: 0

## 2019-12-16 NOTE — ED PROVIDER NOTES
History     Chief Complaint:  Fever    History limited due to patient's status. History obtained via EMS.   HPI   Keyon Farias is a 59 year old male with an extensive medical history that includes cardiac arrest, stroke, DVT, TBI, seizures, septic shock, and encephalopathy who presents via EMS with respiratory distress. Per EMS, the patient was with his mother earlier today and she noticed that the patient was having weakness, fever, and shortness of breath. She gave the patient a Tylenol 2 hours prior to arrival. EMS says that the patient's O2 was 85 on room air and was 96 after giving O2. The patient denies any chest pain, abdominal pain, or other pain.    The patient's mother reports that his home health caregiver has been ill and left work on 12/13. She does not know what symptoms are. The patient has had generalized weakness for the past 3 days ad been sleeping more than usual. He had a deeper sounding and more productive cough since yesterday. She noticed a fever of 100.4 today and gave him tylenol around 1500. He has not been complain of vomiting or diarrhea or pain anywhere.      Allergies:  Dilantin  Valproic Acid    Medications:    Mucomyst  Albuterol  Aspirin 81 mg  Briviact  Tegretol  Iron  Cortef  Duo neb  Synthroid  Melatonin  Reglan  Protonix  Depotestosterone  Sodium bicarbonate  Bactroban      Past Medical History:    Aphasia  TBI  DVT  GERD  Panhypopituitarism  Pneumonia  Seizures  Septic shock  Spastic hemiplegia  Thyroid disease  Tracheostomy care  Stroke  UTI  Ventricular fibrillation  Ventricular tachyarrhythmia  Seizures   Pancreatitis  Asthma  Cardiac arrest  SIRS  Encephalopathy  Aspiration pneumonitis     Past Surgical History:    GI endoscopy  EGD x4  Head and neck surgery  Jejunostomy tube change x4  PICC exchange  Appendectomy  Gastrotomy  Ortho surgery  Tracheostomy x2  Vascular surgery    Family History:    Cancer    Social History:  Smoking Status: Former Smoker  Smokeless Tobacco:  "Never Used  Alcohol Use: Negative   Drug Use: Negative  PCP: Carlos Gomez   Marital Status:  Single      ROS limited due to patient's nonverbal status.   Review of Systems   Constitutional: Positive for fever.   Respiratory: Positive for cough and shortness of breath.    Cardiovascular: Negative for chest pain.   Gastrointestinal: Negative for abdominal pain, diarrhea and vomiting.   Musculoskeletal: Negative for myalgias.   Neurological: Positive for weakness.       Physical Exam     Patient Vitals for the past 24 hrs:   BP Temp Temp src Pulse Heart Rate Resp SpO2 Height   12/16/19 1837 -- -- -- -- 109 10 98 % --   12/16/19 1830 (!) 115/91 -- -- 113 107 15 95 % --   12/16/19 1800 118/79 -- -- 112 111 21 95 % --   12/16/19 1648 109/89 98.2  F (36.8  C) Oral -- 127 18 93 % 1.778 m (5' 10\")        Physical Exam  Nursing note and vitals reviewed.  Constitutional:  Appears well-developed and well-nourished.   HENT:   Head:    Atraumatic.   Mouth/Throat:   Oropharynx is clear and dry. No oropharyngeal exudate. Dry mucous membranes  Eyes:    Pupils are equal, round, and reactive to light.   Neck:    Normal range of motion. Neck supple.      No tracheal deviation present. No thyromegaly present.   Cardiovascular:  Normal rate, regular rhythm, no murmur   Pulmonary/Chest: Crackles in right base. Diminished breath sounds in left base.  Abdominal:   Soft. Bowel sounds are normal. Exhibits no distension and      no mass. There is no tenderness.      There is no rebound and no guarding.   Musculoskeletal:  Exhibits no edema.   Lymphadenopathy:  No cervical adenopathy.   Neurological:   Alert and oriented to person, place, and time. Muscle wasting. Weakness of all extremities. Unable to speak, but makes good eye contact and follows demands.   Skin:    Skin is hot and dry. No rash noted. No pallor. Superficial sacral decubitus ulcerations with mild erythema to sacral area.     Emergency Department Course     Imaging:  Radiology " findings were communicated with the patient and mother who voiced understanding of the findings.    Chest XR,  PA & LAT  AP and lateral views of the chest were obtained.  Cardiomediastinal silhouette is within normal limits. Low lung volume.  Bibasilar pulmonary opacities, left slightly greater than right, could  represent atelectasis versus infection. No significant pleural  effusion or pneumothorax.  Reading per radiology    Laboratory:  Laboratory findings were communicated with the patient and mother who voiced understanding of the findings.    Influenza A/B antigen: A negative, B negative   CBC: WBC 10.1, HGB 12.7 (L),   CMP:  (H), albumin 3.1 (L) o/w WNL (Creatinine 0.88)  Blood Gas lactate gabe POCT: pH 7.39, PCO2 46, PO2 37, Bicarbonate 28, O2 Sat 70, lactic acid 1.8     Interventions:  1724 NS 2 L IV  1913 Zosyn 4.5 mg IV  Vancomycin 1750 mg IV     Emergency Department Course:    1652 Nursing notes and vitals reviewed.    1700 I performed an exam of the patient as documented above.     1717 IV was inserted and blood was drawn for laboratory testing, results above.     1750 The patient was sent for a XR while in the emergency department, results above.      1805 IV was inserted and blood was drawn for laboratory testing, results above.     1846 I spoke with Dr. Epperson of the hospitalist service regarding patient's presentation, findings, and plan of care.     1900 Patient rechecked and updated.       The patient is admitted into the care of Dr. Epperson.     Impression & Plan      Medical Decision Making:  Keyon Farias is a 59 year old male who presents to the emergency department today for evaluation of a fever. I found the patient to have pneumonia which I am concerned as aspiration pneumonia or community acquired pneumonia so he is treated aggressively with Zosyn and Vancomycin IV after blood cultures were performed and he was admitted to the hospital under the care of the hospitalist   Hayden Epperson. I am also concerned about the possibility of early sepsis syndrome since he is tachycardic and febrile. His influenza was negative.         Diagnosis:    ICD-10-CM    1. Pneumonia of both lower lobes due to infectious organism (H) J18.1 Blood culture     Blood culture     Urine Culture     UA with Microscopic     Influenza A/B antigen   2. Pressure injury of sacral region, unstageable (H) L89.150      Disposition:   Findings and plan explained to the Patient and mother who consents to admission. Discussed the patient with Dr. Epperson, who will admit the patient to a [facility] bed for further monitoring, evaluation, and treatment.     Scribe Disclosure:  I, Donald Rivas, am serving as a scribe at 4:59 PM on 12/16/2019 to document services personally performed by Sahara Miles MD based on my observations and the provider's statements to me.        EMERGENCY DEPARTMENT       Sahara Miles MD  12/17/19 0034       Sahara Miles MD  12/17/19 0035

## 2019-12-16 NOTE — ED TRIAGE NOTES
Pt's mother notes pt to be more fatigued the past few days. Mother states that she noticed a fever around 2pm today.

## 2019-12-16 NOTE — ED NOTES
Bed: ED30  Expected date: 12/16/19  Expected time: 4:32 PM  Means of arrival: Ambulance  Comments:  [place] 56m resp distress  ETA [id number]

## 2019-12-17 LAB
ALBUMIN UR-MCNC: 30 MG/DL
ANION GAP SERPL CALCULATED.3IONS-SCNC: 4 MMOL/L (ref 3–14)
APPEARANCE UR: CLEAR
BILIRUB UR QL STRIP: NEGATIVE
BUN SERPL-MCNC: 17 MG/DL (ref 7–30)
CALCIUM SERPL-MCNC: 7.9 MG/DL (ref 8.5–10.1)
CHLORIDE SERPL-SCNC: 111 MMOL/L (ref 94–109)
CO2 SERPL-SCNC: 26 MMOL/L (ref 20–32)
COLOR UR AUTO: YELLOW
CREAT SERPL-MCNC: 0.93 MG/DL (ref 0.66–1.25)
ERYTHROCYTE [DISTWIDTH] IN BLOOD BY AUTOMATED COUNT: 16.4 % (ref 10–15)
GFR SERPL CREATININE-BSD FRML MDRD: >90 ML/MIN/{1.73_M2}
GLUCOSE BLDC GLUCOMTR-MCNC: 159 MG/DL (ref 70–99)
GLUCOSE BLDC GLUCOMTR-MCNC: 84 MG/DL (ref 70–99)
GLUCOSE SERPL-MCNC: 85 MG/DL (ref 70–99)
GLUCOSE UR STRIP-MCNC: 300 MG/DL
HCT VFR BLD AUTO: 35.3 % (ref 40–53)
HGB BLD-MCNC: 11.2 G/DL (ref 13.3–17.7)
HGB UR QL STRIP: NEGATIVE
KETONES UR STRIP-MCNC: NEGATIVE MG/DL
LEUKOCYTE ESTERASE UR QL STRIP: ABNORMAL
MCH RBC QN AUTO: 26.3 PG (ref 26.5–33)
MCHC RBC AUTO-ENTMCNC: 31.7 G/DL (ref 31.5–36.5)
MCV RBC AUTO: 83 FL (ref 78–100)
MUCOUS THREADS #/AREA URNS LPF: PRESENT /LPF
NITRATE UR QL: NEGATIVE
PH UR STRIP: 8 PH (ref 5–7)
PLATELET # BLD AUTO: 190 10E9/L (ref 150–450)
POTASSIUM SERPL-SCNC: 3.8 MMOL/L (ref 3.4–5.3)
RBC # BLD AUTO: 4.26 10E12/L (ref 4.4–5.9)
RBC #/AREA URNS AUTO: 4 /HPF (ref 0–2)
SODIUM SERPL-SCNC: 141 MMOL/L (ref 133–144)
SOURCE: ABNORMAL
SP GR UR STRIP: 1.03 (ref 1–1.03)
SQUAMOUS #/AREA URNS AUTO: 3 /HPF (ref 0–1)
UROBILINOGEN UR STRIP-MCNC: 2 MG/DL (ref 0–2)
WBC # BLD AUTO: 6.5 10E9/L (ref 4–11)
WBC #/AREA URNS AUTO: 4 /HPF (ref 0–5)

## 2019-12-17 PROCEDURE — 99232 SBSQ HOSP IP/OBS MODERATE 35: CPT | Performed by: INTERNAL MEDICINE

## 2019-12-17 PROCEDURE — 85027 COMPLETE CBC AUTOMATED: CPT | Performed by: HOSPITALIST

## 2019-12-17 PROCEDURE — 40000895 ZZH STATISTIC SLP IP EVAL DEFER

## 2019-12-17 PROCEDURE — 25000128 H RX IP 250 OP 636: Performed by: HOSPITALIST

## 2019-12-17 PROCEDURE — 25000132 ZZH RX MED GY IP 250 OP 250 PS 637: Mod: GY | Performed by: HOSPITALIST

## 2019-12-17 PROCEDURE — 40000275 ZZH STATISTIC RCP TIME EA 10 MIN

## 2019-12-17 PROCEDURE — 27210429 ZZH NUTRITION PRODUCT INTERMEDIATE LITER

## 2019-12-17 PROCEDURE — 00000146 ZZHCL STATISTIC GLUCOSE BY METER IP

## 2019-12-17 PROCEDURE — 36415 COLL VENOUS BLD VENIPUNCTURE: CPT | Performed by: HOSPITALIST

## 2019-12-17 PROCEDURE — 94640 AIRWAY INHALATION TREATMENT: CPT

## 2019-12-17 PROCEDURE — 94640 AIRWAY INHALATION TREATMENT: CPT | Mod: 76

## 2019-12-17 PROCEDURE — 25000125 ZZHC RX 250: Performed by: INTERNAL MEDICINE

## 2019-12-17 PROCEDURE — 25000125 ZZHC RX 250

## 2019-12-17 PROCEDURE — 80048 BASIC METABOLIC PNL TOTAL CA: CPT | Performed by: HOSPITALIST

## 2019-12-17 PROCEDURE — 25000125 ZZHC RX 250: Performed by: HOSPITALIST

## 2019-12-17 PROCEDURE — G0463 HOSPITAL OUTPT CLINIC VISIT: HCPCS

## 2019-12-17 PROCEDURE — 25800030 ZZH RX IP 258 OP 636: Performed by: HOSPITALIST

## 2019-12-17 PROCEDURE — 12000000 ZZH R&B MED SURG/OB

## 2019-12-17 RX ORDER — ALBUTEROL SULFATE 0.83 MG/ML
2.5 SOLUTION RESPIRATORY (INHALATION)
Status: DISCONTINUED | OUTPATIENT
Start: 2019-12-17 | End: 2019-12-19 | Stop reason: HOSPADM

## 2019-12-17 RX ORDER — ALBUTEROL SULFATE 0.83 MG/ML
SOLUTION RESPIRATORY (INHALATION)
Status: COMPLETED
Start: 2019-12-17 | End: 2019-12-17

## 2019-12-17 RX ADMIN — MELATONIN 6 MG: 3 TAB ORAL at 21:26

## 2019-12-17 RX ADMIN — ALBUTEROL SULFATE 2.5 MG: 2.5 SOLUTION RESPIRATORY (INHALATION) at 15:55

## 2019-12-17 RX ADMIN — SODIUM BICARBONATE 650 MG TABLET 650 MG: at 09:40

## 2019-12-17 RX ADMIN — CARBAMAZEPINE 150 MG: 100 SUSPENSION ORAL at 01:12

## 2019-12-17 RX ADMIN — Medication 40 MG: at 09:40

## 2019-12-17 RX ADMIN — HYDROCORTISONE 10 MG: 10 TABLET ORAL at 17:25

## 2019-12-17 RX ADMIN — PIPERACILLIN SODIUM AND TAZOBACTAM SODIUM 4.5 G: 4; .5 INJECTION, POWDER, LYOPHILIZED, FOR SOLUTION INTRAVENOUS at 13:04

## 2019-12-17 RX ADMIN — Medication 0.8 MG: at 21:27

## 2019-12-17 RX ADMIN — ACETYLCYSTEINE 2 ML: 200 SOLUTION ORAL; RESPIRATORY (INHALATION) at 15:55

## 2019-12-17 RX ADMIN — LEVOTHYROXINE SODIUM 150 MCG: 150 TABLET ORAL at 09:40

## 2019-12-17 RX ADMIN — MELATONIN 2000 UNITS: at 09:40

## 2019-12-17 RX ADMIN — Medication 1 PACKET: at 09:40

## 2019-12-17 RX ADMIN — CARBAMAZEPINE 150 MG: 100 SUSPENSION ORAL at 12:18

## 2019-12-17 RX ADMIN — METOCLOPRAMIDE 5 MG: 5 SOLUTION ORAL at 09:40

## 2019-12-17 RX ADMIN — POTASSIUM & SODIUM PHOSPHATES POWDER PACK 280-160-250 MG 1 PACKET: 280-160-250 PACK at 09:40

## 2019-12-17 RX ADMIN — ALBUTEROL SULFATE 2.5 MG: 2.5 SOLUTION RESPIRATORY (INHALATION) at 20:00

## 2019-12-17 RX ADMIN — PIPERACILLIN SODIUM AND TAZOBACTAM SODIUM 4.5 G: 4; .5 INJECTION, POWDER, LYOPHILIZED, FOR SOLUTION INTRAVENOUS at 21:20

## 2019-12-17 RX ADMIN — PIPERACILLIN SODIUM AND TAZOBACTAM SODIUM 4.5 G: 4; .5 INJECTION, POWDER, LYOPHILIZED, FOR SOLUTION INTRAVENOUS at 01:12

## 2019-12-17 RX ADMIN — Medication 0.8 MG: at 17:26

## 2019-12-17 RX ADMIN — ACETYLCYSTEINE: 200 SOLUTION ORAL; RESPIRATORY (INHALATION) at 07:20

## 2019-12-17 RX ADMIN — METOCLOPRAMIDE 5 MG: 5 SOLUTION ORAL at 21:26

## 2019-12-17 RX ADMIN — ACETYLCYSTEINE 2 ML: 200 SOLUTION ORAL; RESPIRATORY (INHALATION) at 19:59

## 2019-12-17 RX ADMIN — VANCOMYCIN HYDROCHLORIDE 1500 MG: 5 INJECTION, POWDER, LYOPHILIZED, FOR SOLUTION INTRAVENOUS at 10:25

## 2019-12-17 RX ADMIN — ALBUTEROL SULFATE 2.5 MG: 2.5 SOLUTION RESPIRATORY (INHALATION) at 07:20

## 2019-12-17 RX ADMIN — ALBUTEROL SULFATE 2.5 MG: 2.5 SOLUTION RESPIRATORY (INHALATION) at 11:42

## 2019-12-17 RX ADMIN — BRIVARACETAM 100 MG: 10 SOLUTION ORAL at 09:41

## 2019-12-17 RX ADMIN — CARBAMAZEPINE 150 MG: 100 SUSPENSION ORAL at 05:33

## 2019-12-17 RX ADMIN — PIPERACILLIN SODIUM AND TAZOBACTAM SODIUM 4.5 G: 4; .5 INJECTION, POWDER, LYOPHILIZED, FOR SOLUTION INTRAVENOUS at 06:27

## 2019-12-17 RX ADMIN — Medication 0.8 MG: at 10:48

## 2019-12-17 RX ADMIN — BRIVARACETAM 100 MG: 10 SOLUTION ORAL at 21:20

## 2019-12-17 RX ADMIN — HYDROCORTISONE 20 MG: 20 TABLET ORAL at 09:40

## 2019-12-17 RX ADMIN — ACETYLCYSTEINE 2 ML: 200 SOLUTION ORAL; RESPIRATORY (INHALATION) at 11:39

## 2019-12-17 RX ADMIN — MINERAL SUPPLEMENT IRON 300 MG / 5 ML STRENGTH LIQUID 100 PER BOX UNFLAVORED 220 MG: at 11:07

## 2019-12-17 RX ADMIN — ASPIRIN 81 MG 81 MG: 81 TABLET ORAL at 09:40

## 2019-12-17 RX ADMIN — POTASSIUM & SODIUM PHOSPHATES POWDER PACK 280-160-250 MG 1 PACKET: 280-160-250 PACK at 17:25

## 2019-12-17 RX ADMIN — CARBAMAZEPINE 150 MG: 100 SUSPENSION ORAL at 17:26

## 2019-12-17 ASSESSMENT — ACTIVITIES OF DAILY LIVING (ADL)
ADLS_ACUITY_SCORE: 43
ADLS_ACUITY_SCORE: 45
ADLS_ACUITY_SCORE: 47
ADLS_ACUITY_SCORE: 43

## 2019-12-17 ASSESSMENT — MIFFLIN-ST. JEOR: SCORE: 1581.5

## 2019-12-17 NOTE — CONSULTS
CLINICAL NUTRITION SERVICES  -  ASSESSMENT NOTE      Recommendations Ordered by Registered Dietitian (RD): Isosource 1.5 at 115 mL/hr x 12 hours (7 am to 7 pm) to provide:  2070 kcal (28 kcal/kg), 94 g protein (1.3 g/kg), 243 g CHO, 21 g fiber, 1049 mL H2O  Continue NutriSource Fiber 1 pkt daily to provide:  15 kcal and 3 g fiber  Total:  2085 kcal (28 kcal/kg), 24 g fiber   Flushes 60 mL every 4 hours    Malnutrition:   % Weight Loss:  None noted  % Intake:  No decreased intake noted  Subcutaneous Fat Loss:  None observed  Muscle Loss:  None observed  Fluid Retention:  None noted    Malnutrition Diagnosis: Patient does not meet two of the above criteria necessary for diagnosing malnutrition        REASON FOR ASSESSMENT  Keyon Farias is a 59 year old male seen by Registered Dietitian for Provider Order - Registered Dietitian to Assess and Order TF per Medical Nutrition Therapy Protocol      NUTRITION HISTORY  - Information obtained from Epic as patient well known to our services.  He is non-verbal at baseline and mother not present early this morning.   - Patient with frequent admissions -- last nutrition history was obtained on 10/26/19 as follows ~  - Reliant on GJT to meet 100% nutritional needs  - PMH: aspiration and has GJ tube, 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, Gastro-oesophageal reflux disease, Spastic hemiplegia affecting dominant side           - Admitted with vomiting, fever    - Lives with his mother and has 24 hour care from home health agency  - Tube type: GJT (last replaced on 8/7/2019)  - Enteral Regimen: Jevity 1.5 (5 to 5.5 cans daily) x 12 hours during the day  (7 am - 7 pm)  - Provides: 8587-9336 kcal, 77-83g protein, ~260g CHO, ~27g fiber and ~900mL free water.   - Fluid flush: H2O flushes 120 mL QID.     Per last RD note: Pt lives with his mother, she likes to keep the TF rate at no more than 100 ml/hr. He is fed during the day rather than at night so that he can be up in the chair to prevent  "aspiration        CURRENT NUTRITION ORDERS  Diet Order:     NPO   Plans for TF resumption this morning     Current Intake/Tolerance:  N/A      NUTRITION FOCUSED PHYSICAL ASSESSMENT FOR DIAGNOSING MALNUTRITION)  Yes (visual only -- patient sleeping, did not wake as he is unable to participate in the physical exam)               Observed:    No nutrition-related physical findings observed    Obtained from Chart/Interdisciplinary Team:  Pressure Injury - WOCN eval pending    ANTHROPOMETRICS  Height: 6'0\" (per several prior nutritional assessments)  Weight: 75 kg (165#)(12/17)  Body mass index is 22.3 kg/m   Weight Status:  Normal BMI  IBW: 80.9 kg   % IBW: 93%  Weight History:   Wt Readings from Last 10 Encounters:   12/17/19 75 kg (165 lb 6.4 oz)   11/15/19 72.6 kg (160 lb)   11/11/19 72.9 kg (160 lb 12.8 oz)   10/28/19 73.6 kg (162 lb 4.1 oz)   10/13/19 68.9 kg (152 lb)   10/10/19 69 kg (152 lb 1.9 oz)   09/27/19 73.9 kg (163 lb)   09/24/19 74 kg (163 lb 2.3 oz)   09/18/19 69.9 kg (154 lb 1.6 oz)   09/13/19 72 kg (158 lb 11.2 oz)   Weight typically trends between 155-165# per old records       LABS  Labs reviewed    MEDICATIONS  IVF 75 mL/hr   Nutrisource Fiber 1 pkt via FT daily = 15 kcal and 3 g fiber       ASSESSED NUTRITION NEEDS PER APPROVED PRACTICE GUIDELINES:    Dosing Weight 75 kg   Estimated Energy Needs: 3792-2083 kcals (25-30 Kcal/Kg)  Justification: maintenance and spastic hemiplegia   Estimated Protein Needs:  grams protein (1.2-1.5 g pro/Kg)  Justification: hypercatabolism with acute illness  Estimated Fluid Needs: 2325-4327 mL (1 mL/Kcal)  Justification: maintenance    MALNUTRITION:  % Weight Loss:  None noted  % Intake:  No decreased intake noted  Subcutaneous Fat Loss:  None observed  Muscle Loss:  None observed  Fluid Retention:  None noted    Malnutrition Diagnosis: Patient does not meet two of the above criteria necessary for diagnosing malnutrition    NUTRITION DIAGNOSIS:  Inadequate " enteral nutrition infusion related to TF off this morning with hospital admission with plans to resume today as evidenced by meeting 0% needs      NUTRITION INTERVENTIONS  Recommendations / Nutrition Prescription  Isosource 1.5 at 115 mL/hr x 12 hours (7 am to 7 pm) to provide:  2070 kcal (28 kcal/kg), 94 g protein (1.3 g/kg), 243 g CHO, 21 g fiber, 1049 mL H2O  Continue NutriSource Fiber 1 pkt daily to provide:  15 kcal and 3 g fiber  Total:  2085 kcal (28 kcal/kg), 24 g fiber   Flushes 60 mL every 4 hours     Implementation  Nutrition education: Not appropriate at this time due to patient condition  EN Composition, EN Schedule and Feeding Tube Flush:  Above TF regimen ordered in Epic     Nutrition Goals  TF regimen will meet % needs     MONITORING AND EVALUATION:  Progress towards goals will be monitored and evaluated per protocol and Practice Guidelines    Swati Parrish RD, LD, CNSC   Clinical Dietitian - [facility]

## 2019-12-17 NOTE — PLAN OF CARE
Discharge Planner SLP   Patient plan for discharge: Did not discuss  Current status: Swallow evaluation orders received and acknowledged. Pt's mother is present and again discussed pt's dysphagia hx and high risk for aspiration. Pt's mother states that pt does not eat or drink anything, not even for pleasure. She stated insurance will no longer cover SLP services. Pt's mother feels he may have aspirated after he vomited prior to admission. She is wanting Keyon to be able to eat again, but also admits in the past he has pulled his head away and did not express interest in PO . We did discuss with the unlikelihood that swallow function has/will improve, pt's mother seems discouraged by this but agrees now certainly is not he best time to address swallow function given his acute illness.      Defer inpatient SLP evaluation as it is not warranted given pt's significant dysphagia hx and risk for aspiration.     Barriers to return to prior living situation: None per SLP  Recommendations for discharge: Defer to medical team  Rationale for recommendations: No further SLP services warranted at this time.          Entered by: Marina Hathaway 12/17/2019 11:24 AM

## 2019-12-17 NOTE — PHARMACY-VANCOMYCIN DOSING SERVICE
Pharmacy Vancomycin Initial Note  Date of Service 2019  Patient's  1960  59 year old, male    Indication: [facility] Pneumonia    Current estimated CrCl = Estimated Creatinine Clearance: 95.1 mL/min (based on SCr of 0.88 mg/dL).    Creatinine for last 3 days  2019:  5:17 PM Creatinine 0.88 mg/dL    Recent Vancomycin Level(s) for last 3 days  No results found for requested labs within last 72 hours.      Vancomycin IV Administrations (past 72 hours)                   vancomycin (VANCOCIN) 1,750 mg in sodium chloride 0.9 % 500 mL intermittent infusion (mg) 1,750 mg New Bag 19 2208                Nephrotoxins and other renal medications (From now, onward)    Start     Dose/Rate Route Frequency Ordered Stop    19 1000  vancomycin 1500 mg in 0.9% NaCl 250 ml intermittent infusion 1,500 mg      1,500 mg  over 90 Minutes Intravenous EVERY 12 HOURS 19 22119 0100  piperacillin-tazobactam (ZOSYN) 4.5 g vial to attach to  mL bag     Note to Pharmacy:  Pharmacy can adjust dose based on renal function.    4.5 g  over 30 Minutes Intravenous EVERY 6 HOURS 19 1840  vancomycin (VANCOCIN) 1,750 mg in sodium chloride 0.9 % 500 mL intermittent infusion      1,750 mg  over 2 Hours Intravenous ONCE 19 1839            Contrast Orders - past 72 hours (72h ago, onward)    None                Plan:  1.  Start vancomycin  1750mg loading dose then 1500 mg IV q12h.   2.  Goal Trough Level: 15-20 mg/L   3.  Pharmacy will check trough levels as appropriate in 1-3 Days.    4. Serum creatinine levels will be ordered daily for the first week of therapy and at least twice weekly for subsequent weeks.    5. [place] method utilized to dose vancomycin therapy: Method 1    Manish Jenkins [facility]

## 2019-12-17 NOTE — H&P
[facility]    History and Physical  Hospitalist       Date of Admission:  12/16/2019  Date of Service (when I saw the patient): 12/16/19    Assessment & Plan   Keyon Farias is a 59 year old male who presents with pneumonia. Admitted for further evaluation and treatment.     Suspected healthcare associated pneumonia versus aspiration pneumonia, recurrent: Patient with history of cardiac arrest, TBI, seizures, septic shock as well as previous several admissions or encephalopathy due to infections, patient presented with with mother today due to weakness, fever, shortness of breath, with ongoing fever.  History of present illness is limited due to patient with traumatic brain injury and review of systems is limited due to patient with cognitive impairment.  Long history of aspiration pneumonia and pneumonitis.  - Vancomycin.   - Zosyn  - BC pending.   - Pulmonary hygiene.   - Speech therapy consulted.    - HOB elevation orders, aspiration precautions.   - Continue prior to admission Mucomyst/albuterol nebulizer treatments  - Continue prior to admission duo nebs  - n.p.o. status   - Continue prior to admission scopolamine powder when verified by pharmacy.      Hyponatremia: Resolved. tube feed dependent.  - Monitor.   - Nutrition following.      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   - Resume prior to admission testosterone at discharge  - Prior to admission hydrocortisone when verified by pharmacy.   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 Phylicia Stout of [facility]     Perianal pressure injury with skin tear: Many previous admissions with wounds, high risk for wound development from pressure and immobility.   - Miconazole powder  - Wound nurse consulted for follow up.   - Frequent repositioning     Skin  breakdown on penis: Noted on previous admission between border of shaft of penis and glans penis on the right. Suspect secondary to condom catheter.  - Wound nurse consulted.   - Uses condom cath at home per report.     DVT Prophylaxis: Pneumatic Compression Devices  Code Status: Full Code    Disposition: Inpatient.    Dr. Hayden Epperson D.O.  [facility] Hospitalist  Pager [phone]    Primary Care Physician   Carlos Gomez    Chief Complaint   Fever.     History is obtained from the patient and medical records.     History of Present Illness   Keyon Farias is a 59 year old male who presents with pneumonia. Admitted for further evaluation and treatment. Suspected healthcare associated pneumonia versus aspiration pneumonia, recurrent: Patient with history of cardiac arrest, TBI, seizures, septic shock as well as previous several admissions or encephalopathy due to infections, patient presented with with mother today due to weakness, fever, shortness of breath, with ongoing fever.  History of present illness is limited due to patient with traumatic brain injury and review of systems is limited due to patient with cognitive impairment.  Long history of aspiration pneumonia and pneumonitis.    Past Medical History    I have reviewed this patient's medical history and updated it with pertinent information if needed.   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  Past Surgical History:   Procedure Laterality Date     ENDOSCOPIC ULTRASOUND UPPER GASTROINTESTINAL TRACT (GI) N/A 1/30/2017    Procedure: ENDOSCOPIC ULTRASOUND, ESOPHAGOSCOPY / UPPER GASTROINTESTINAL TRACT (GI);  Surgeon: Jus Montana MD;  Location: [facility] OR     ENDOSCOPIC ULTRASOUND, ESOPHAGOSCOPY, GASTROSCOPY, DUODENOSCOPY (EGD), NECROSECTOMY N/A 2/7/2017    Procedure: ENDOSCOPIC ULTRASOUND, ESOPHAGOSCOPY, GASTROSCOPY, DUODENOSCOPY (EGD), NECROSECTOMY;  Surgeon: Jack Marcus MD;  Location: [facility] OR     ESOPHAGOSCOPY, GASTROSCOPY, DUODENOSCOPY (EGD), COMBINED  3/13/2014    Procedure: COMBINED ESOPHAGOSCOPY, GASTROSCOPY, DUODENOSCOPY (EGD), BIOPSY SINGLE OR MULTIPLE;  gastroscopy;  Surgeon: Digna Rhodes MD;  Location: [facility]     ESOPHAGOSCOPY, GASTROSCOPY, DUODENOSCOPY (EGD), COMBINED N/A 12/6/2016    Procedure: COMBINED ESOPHAGOSCOPY, GASTROSCOPY, DUODENOSCOPY (EGD);  Surgeon: Digna Rhodes MD;  Location: [facility]     ESOPHAGOSCOPY, GASTROSCOPY, DUODENOSCOPY (EGD), COMBINED N/A 2/7/2017    Procedure: COMBINED ENDOSCOPIC ULTRASOUND, ESOPHAGOSCOPY, GASTROSCOPY, DUODENOSCOPY (EGD), FINE NEEDLE ASPIRATE/BIOPSY;  Surgeon: Too Thakur MD;  Location: [facility] OR     HEAD & NECK SURGERY      reconstructive facial surgery following accident in 1989     IR FOLLOW UP VISIT INPATIENT  2/20/2019     IR GASTRO JEJUNOSTOMY TUBE CHANGE  12/20/2018     IR GASTRO JEJUNOSTOMY TUBE CHANGE  2/4/2019     IR GASTRO JEJUNOSTOMY TUBE CHANGE  3/8/2019     IR GASTRO JEJUNOSTOMY TUBE CHANGE  8/7/2019     IR PICC EXCHANGE LEFT  8/15/2019      LAPAROSCOPIC APPENDECTOMY  2013    Procedure: LAPAROSCOPIC APPENDECTOMY;  LAPAROSCOPIC APPENDECTOMY;  Surgeon: Manish Pierce MD;  Location:  OR     LAPAROSCOPIC ASSISTED INSERTION TUBE GASTROTOMY N/A 2016    Procedure: LAPAROSCOPIC ASSISTED INSERTION TUBE GASTROSTOMY;  Surgeon: Manish Pierce MD;  Location:  OR     ORTHOPEDIC SURGERY      right hand repair     TRACHEOSTOMY N/A 9/3/2016    Procedure: TRACHEOSTOMY;  Surgeon: João Ortiz MD;  Location:  OR     TRACHEOSTOMY N/A 2016    Procedure: TRACHEOSTOMY;  Surgeon: João Ortiz MD;  Location:  OR     VASCULAR SURGERY         Prior to Admission Medications   Prior to Admission Medications   Prescriptions Last Dose Informant Patient Reported? Taking?   Bioflavonoid Products (VITAMIN C PLUS) 1000 MG TABS  Other Yes No   Si tablet by Oral or FT or NG tube route   Brivaracetam (BRIVIACT) 10 MG/ML solution  Other Yes No   Si mg by Oral or Feeding Tube route 2 times daily 0900, 2100   Guar Gum (FIBER MODULAR, NUTRISOURCE FIBER,) packet  Other No No   Si packet by Per G Tube route daily   Scopolamine HBr POWD  Other Yes No   Sig: G-tube regimen of 0.8 at 8am then 0.8 noon and 0.4 bedtime.   Scopolamine HBr POWD   No No   Sig: Dispense #90. Mix contents with small amount of water for admin via J-tube.  Administer 0.8 mg three times each day.   Skin Protectants, Misc. (BALMEX SKIN PROTECTANT) OINT  Other Yes No   Sig: Externally apply topically 2 times daily as needed (irritation) Applay to reddened memo areas twice daily as needed   acetylcysteine (MUCOMYST) 20 % neb solution  Other Yes No   Sig: Take 2 mLs by nebulization every 6 hours With albuterol at 0700, 1100, 1500, and 1900   albuterol (PROVENTIL) (5 MG/ML) 0.5% neb solution  Other Yes No   Sig: Take 2.5 mg by nebulization every 4 hours (while awake) 0700 1100 1500 1900 with mucomyst    aspirin (ASA) 81 MG chewable tablet  Other Yes No   Si mg  by Oral or Feeding Tube route daily At 0900   bacitracin ointment  Other Yes No   Sig: Apply topically daily as needed for wound care To PEG site.    calcium carbonate 1250 (500 CA) MG/5ML SUSP suspension  Other No No   Si mLs (1,250 mg) by Per J Tube route 3 times daily (with meals)   carBAMazepine (TEGRETOL) 100 MG/5ML suspension  Other Yes No   Si mg by Oral or Feeding Tube route every 6 hours At 06:00, 12:00, 18:00 and 24:00 for seizures    ferrous sulfate 220 (44 Fe) MG/5ML ELIX  Other Yes No   Si mg by Per Feeding Tube route daily   hydrocortisone (CORTEF) 5 MG tablet  Other Yes No   Sig: 10 mg by Oral or FT or NG tube route daily (with dinner) At 1500   hydrocortisone (CORTEF) 5 MG tablet  Other Yes No   Si mg by Oral or FT or NG tube route every morning    hydrocortisone 1 % CREA cream  Other Yes No   Sig: Place rectally 2 times daily as needed for other Apply to reddened memo areas as needed   ipratropium - albuterol 0.5 mg/2.5 mg/3 mL (DUONEB) 0.5-2.5 (3) MG/3ML neb solution  Other Yes No   Sig: Take 1 vial by nebulization every 4 hours as needed (bronchospasms or cough)   levothyroxine (SYNTHROID/LEVOTHROID) 150 MCG tablet  Other Yes No   Sig: Take 150 mcg by mouth every morning   melatonin (MELATONIN) 1 MG/ML LIQD liquid  Other Yes No   Si mg by Per NG tube route At Bedtime    metoclopramide (REGLAN) 5 MG/5ML solution  Other Yes No   Si mg by Per Feeding Tube route 2 times daily   miconazole (MICATIN) 2 % AERP powder  Other Yes No   Sig: Apply topically 2 times daily as needed    mupirocin (BACTROBAN) 2 % external ointment  Other Yes No   Sig: Apply topically 2 times daily as needed    order for DME  Other No No   Sig: Equipment being ordered: Nebulizer   pantoprazole (PROTONIX) 2 mg/mL SUSP suspension  Other No No   Si mLs (40 mg) by Per J Tube route daily   potassium & sodium phosphates (NEUTRA-PHOS) 280-160-250 MG Packet  Other Yes No   Sig: Take 1 packet by mouth 2  times daily    scopolamine (TRANSDERM) 1 MG/3DAYS 72 hr patch  Other No No   Sig: Place 1 patch onto the skin every 72 hours   sodium bicarbonate 650 MG tablet   No No   Sig: Take 1 tablet (650 mg) by mouth daily   testosterone cypionate (DEPOTESTOTERONE CYPIONATE) 200 MG/ML injection  Other Yes No   Sig: Inject 76 mg into the muscle See Admin Instructions Every 2 weeks on Fridays   76 mg or 0.38 mL   vitamin D3 (CHOLECALCIFEROL) 2000 units (50 mcg) tablet  Other Yes No   Sig: Take 2,000 Units by mouth daily Crush and feed via j-tube @@ 0900      Facility-Administered Medications: None     Allergies   Allergies   Allergen Reactions     Dilantin [Phenytoin Sodium]      Valproic Acid      Toxicity c bone marrow suspension, elevated ammonia levels        Social History   I have reviewed this patient's social history and updated it with pertinent information if needed. Keyon Farias  reports that he quit smoking about 30 years ago. He has never used smokeless tobacco. He reports that he does not drink alcohol or use drugs.    Family History   I have reviewed this patient's family history and updated it with pertinent information if needed.   Family History   Problem Relation Age of Onset     Cancer Father        Review of Systems   The 10 point Review of Systems is negative other than noted in the HPI or here.     Physical Exam   Temp: 98.2  F (36.8  C) Temp src: Oral BP: (!) 115/91 Pulse: 113 Heart Rate: 109 Resp: 10 SpO2: 98 % O2 Device: None (Room air)    Vital Signs with Ranges  Temp:  [98.2  F (36.8  C)] 98.2  F (36.8  C)  Pulse:  [112-113] 113  Heart Rate:  [107-127] 109  Resp:  [10-21] 10  BP: (109-118)/(79-91) 115/91  SpO2:  [93 %-98 %] 98 %  0 lbs 0 oz    GENERAL: Alert. NAD.  Muscle wasting, weakness of all extremities, follows commands.  HEENT: Normocephalic. EOMI. No icterus or injection. Nares normal.   LUNGS: Crackles to right base, with decrement bilaterally at bases. No dyspnea at rest.   HEART: Regular  rate. Extremities perfused.   ABDOMEN: Soft, nontender, and nondistended. Positive bowel sounds. Condom catheter.   EXTREMITIES: No LE edema noted.  Does have sacral ulcerations and erythema.  NEUROLOGIC: Follows commands limitedly. Tracks with eyes.     Data   Data reviewed today:  I personally reviewed both laboratory and imaging data.   Recent Labs   Lab 12/16/19  1805 12/16/19  1717   WBC 10.1  --    HGB 12.7*  --    MCV 82  --      --    NA  --  142   POTASSIUM  --  4.4   CHLORIDE  --  106   CO2  --  28   BUN  --  20   CR  --  0.88   ANIONGAP  --  8   STEVE  --  8.7   GLC  --  207*   ALBUMIN  --  3.1*   PROTTOTAL  --  8.0   BILITOTAL  --  0.3   ALKPHOS  --  108   ALT  --  45   AST  --  43       Recent Results (from the past 24 hour(s))   Chest XR,  PA & LAT    Narrative    XR CHEST TWO VIEWS   12/16/2019 5:50 PM     HISTORY: Check for pneumonia, fever and hypoxia.    COMPARISON: Chest x-ray on 11/11/2019      Impression    IMPRESSION: AP and lateral views of the chest were obtained.  Cardiomediastinal silhouette is within normal limits. Low lung volume.  Bibasilar pulmonary opacities, left slightly greater than right, could  represent atelectasis versus infection. No significant pleural  effusion or pneumothorax.

## 2019-12-17 NOTE — PLAN OF CARE
DATE & TIME: 12/17/19 1074-5438                 Cognitive Concerns/ Orientation : Alert, nonverbal. Nods head to yes or no questions.  BEHAVIOR & AGGRESSION TOOL COLOR: Green   CIWA SCORE: N/A      ABNL VS/O2: VSS on RA   MOBILITY: Total care. Turn/Repo Q2h   PAIN MANAGMENT: Denies  DIET: NPO, TF 115ml/hr   BOWEL/BLADDER: Incont of bowel and bladder.  ABNL LAB/BG: Hgb 11.2 BG 84   DRAIN/DEVICES: IVF infusing at 75mL/hr   TELEMETRY RHYTHM: NSR   SKIN: Redness and excoriation on buttocks and penis.  TESTS/PROCEDURES:   D/C DAY/GOALS/PLACE: Pending   OTHER IMPORTANT INFO: Contact precautions, MRSA & VRE. Tube feeding 115ml/hr 7am-7pm, started at 10:30am. Q4 60ml  flush.  WOC and Nutrition consulted. IV antibiotics

## 2019-12-17 NOTE — CONSULTS
Care Transition Initial Assessment - RN        Met with: Patient, his Mother Savannah and his PCA Keyon.  His mother is his Guardian and Keyon lives with them.  DATA   Active Problems:    Pneumonia       Cognitive Status: awake, hx TBI     Contact information and PCP information verified: Yes      Insurance concerns: No Insurance issues identified  ASSESSMENT  Patient currently receives the following services: [facility] (P:[phone]) (Fax:[phone])for RN 12 Hr coverage daily and also receives a 12 hr PCA adarsh/night through [employer] (currently 80.5 hours every 7 days) and TF with supplies through [facility].       Identified issues/concerns regarding health management: This pt's 18th admission this year.  His last admission was 10/25-10/29, so big improvement in length of time before readmission.  Pt is using Scopolamine tid, that has really helped decrease his secretions.  Pt is now attending a day program through [employer] for 3 hrs T-F AM.  Savannah feels this has been good for pt.  Pt has transportation provided for this.  Savannah takes pt in her own car for his medical appointments.  She has had back surgery, so is not able to help pt's home care nurse put him in the vehicle.  Writer did discuss concern on caregiver's back, as pt is not able to help with transferring.  In the past pt had home care OT/PT through [facility].  He is no longer receiving this, but Savannah would like this back along with ST.  ST has seen pt and has not recommended any additional ST.    PLAN  Financial costs for the patient include: NA.    Patient/family is agreeable to the plan?  Yes: planning discharge 2-3 days to home    Patient anticipates needs for home equipment: No     Transportation on day of discharge  is needing to be set-up through [employer] via stretcher.  Savannah will coordinate to ensure the home care nurse is available    Appointments:  Will set-up PCP appointment prior to  discharge.      Care  (CTS) will continue to follow as needed.    [facility] Care Coordination  [phone]

## 2019-12-17 NOTE — PLAN OF CARE
Cognitive Concerns/ Orientation : Alert, uses hand gestures for answering yes and no questions. Fell asleep quickly this evening.    BEHAVIOR & AGGRESSION TOOL COLOR: Green   CIWA SCORE: NA    ABNL VS/O2: VSS, On RA   MOBILITY: Total cares, lift sheet on bed. Turned Q2h   PAIN MANAGMENT: Denied   DIET: NPO, nutrition to see pt. Medications through feeding tube.   BOWEL/BLADDER: Condom cath in place. Incont of bowel and bladder   ABNL LAB/BG: WDL                                                                                                                                             DRAIN/DEVICES: PIV LUE infusing NS at 75mL/hr   TELEMETRY RHYTHM: NSR   SKIN: Wound on buttocks and penis. WOC RN consulted.   TESTS/PROCEDURES: NA, UA/UC ordered from ED   D/C DAY/GOALS/PLACE: Pending   OTHER IMPORTANT INFO: Contact precautions, bed alarm on for safety. NPO. HOB >30 degrees. Rounded on freq.

## 2019-12-17 NOTE — PROGRESS NOTES
[facility]    Hospitalist Progress Note    Interval History   - Wakes up, somewhat confused this morning, more somnolent this afternoon. Reportedly near nonverbal at baseline  - Updated mother Savannah    Assessment & Plan   Summary: Keyon Farias is a 59 year old male with PMH cardiac arrest, TBI, seizures, septic shock, nonverbal baseline status, aspiration pneumonia and pnuemonitis, who was admitted on 12/16/2019 with weakness, fever, SOB, likely HCAP.     Suspected healthcare associated pneumonia versus aspiration pneumonia, recurrent  Patient presented with with mother today due to weakness, fever, shortness of breath, with ongoing fever.  History of present illness is limited due to patient with traumatic brain injury and review of systems is limited due to patient with cognitive impairment. Long history of aspiration pneumonia and pneumonitis.  - Stop Vanc  - Continue Zosyn  - Speech therapy  - HOB elevation orders, aspiration precautions  - Continue prior to admission Mucomyst/albuterol nebulizer treatments    Tube feeding: Restarted here. 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 Outpatient neurology follow-up with Dr. Phylicia Stout of [facility]  PTA Tegretol, Briviact     Perianal pressure injury with skin tear: Many previous admissions with wounds, high risk for wound development from pressure and immobility.   - Miconazole powder  - Wound nurse consulted for follow up.   - Frequent repositioning     Skin breakdown on penis: Noted on previous admission between border of shaft of penis and glans penis on the right. Suspect secondary to condom catheter.  - Wound nurse consulted.  - Uses condom cath at home per report    DVT Prophylaxis: Pneumatic Compression Devices  Code Status: Full  Code  PT/OT: not needed    Disposition: Expected discharge ~2-3 days pending improvement in mental status    Yoni Vicente MD  Text Page  (7am to 6pm)  -Data reviewed today: I reviewed all new labs and imaging results over the last 24 hours.    Physical Exam   Temp: 98.3  F (36.8  C) Temp src: Axillary BP: 119/72 Pulse: 113 Heart Rate: 88 Resp: 14 SpO2: 96 % O2 Device: None (Room air)    Vitals:    12/17/19 0547   Weight: 75 kg (165 lb 6.4 oz)     Vital Signs with Ranges  Temp:  [97.8  F (36.6  C)-98.3  F (36.8  C)] 98.3  F (36.8  C)  Pulse:  [112-113] 113  Heart Rate:  [] 88  Resp:  [10-21] 14  BP: (109-119)/(61-91) 119/72  SpO2:  [93 %-98 %] 96 %  I/O last 3 completed shifts:  In: 140 [NG/GT:140]  Out: 150 [Urine:150]  O2 requirements: none    Constitutional: Male somnolent sleeping  HEENT: Mouth open and oral mucosa dry  Cardiovascular: RRR, normal S1/2, no m/r/g  Respiratory: CTAB, no wheezing or crackles  Vascular: No LE pitting edema  GI: G tube noted  Skin/Integumen: No rashes  Neuro/Psych: Sleeping, difficult to wake up    Medications     IV fluid REPLACEMENT ONLY       sodium chloride 75 mL/hr at 12/16/19 2208       acetylcysteine  2 mL Nebulization 4x Daily     albuterol  2.5 mg Nebulization Q4H While awake     aspirin  81 mg Oral or Feeding Tube Daily     Brivaracetam  100 mg Oral or Feeding Tube BID     carBAMazepine  150 mg Oral or Feeding Tube Q6H     ferrous sulfate  220 mg Per Feeding Tube Daily     fiber modular (NUTRISOURCE FIBER)  1 packet Per G Tube Daily     hydrocortisone  10 mg Per Feeding Tube Daily with supper     hydrocortisone  20 mg Per Feeding Tube QAM     levothyroxine  150 mcg Oral QAM     melatonin  6 mg Per NG tube At Bedtime     metoclopramide  5 mg Per Feeding Tube BID     pantoprazole  40 mg Per J Tube Daily     piperacillin-tazobactam  4.5 g Intravenous Q6H     potassium & sodium phosphates  1 packet Oral BID     Scopolamine HBr  0.8 mg Feeding TID     sodium  bicarbonate  650 mg Oral Daily     sodium chloride (PF)  3 mL Intracatheter Q8H     vancomycin (VANCOCIN) IV  1,500 mg Intravenous Q12H     Vitamin D3  2,000 Units Per Feeding Tube Daily       Data   Recent Labs   Lab 12/17/19  0927 12/16/19  1805 12/16/19  1717   WBC 6.5 10.1  --    HGB 11.2* 12.7*  --    MCV 83 82  --     265  --      --  142   POTASSIUM 3.8  --  4.4   CHLORIDE 111*  --  106   CO2 26  --  28   BUN 17  --  20   CR 0.93  --  0.88   ANIONGAP 4  --  8   STEVE 7.9*  --  8.7   GLC 85  --  207*   ALBUMIN  --   --  3.1*   PROTTOTAL  --   --  8.0   BILITOTAL  --   --  0.3   ALKPHOS  --   --  108   ALT  --   --  45   AST  --   --  43       Imaging:   Recent Results (from the past 24 hour(s))   Chest XR,  PA & LAT    Narrative    XR CHEST TWO VIEWS   12/16/2019 5:50 PM     HISTORY: Check for pneumonia, fever and hypoxia.    COMPARISON: Chest x-ray on 11/11/2019      Impression    IMPRESSION: AP and lateral views of the chest were obtained.  Cardiomediastinal silhouette is within normal limits. Low lung volume.  Bibasilar pulmonary opacities, left slightly greater than right, could  represent atelectasis versus infection. No significant pleural  effusion or pneumothorax.    DIANE DON MD

## 2019-12-17 NOTE — PHARMACY-ADMISSION MEDICATION HISTORY
Pharmacy Medication History  Admission medication history interview status for the 12/16/2019  admission is complete. See EPIC admission navigator for prior to admission medications     Medication history sources: Patient's family/friend (mother)  Medication history source reliability: Good  Adherence assessment: Good    Significant changes made to the medication list:  Deleted scope patch (gives rash)      Additional medication history information:   none    Medication reconciliation completed by provider prior to medication history? No    Time spent in this activity: 20 minutes      Prior to Admission medications    Medication Sig Last Dose Taking? Auth Provider   acetylcysteine (MUCOMYST) 20 % neb solution Take 2 mLs by nebulization 4 times daily With albuterol at 0700, 1100, 1500, and 1900  12/16/2019 at Unknown time Yes Unknown, Entered By History   albuterol (PROVENTIL) (5 MG/ML) 0.5% neb solution Take 2.5 mg by nebulization every 4 hours (while awake) 0700 1100 1500 1900 with mucomyst  12/16/2019 at Unknown time Yes Unknown, Entered By History   aspirin (ASA) 81 MG chewable tablet 81 mg by Oral or Feeding Tube route daily At 0900 12/16/2019 at Unknown time Yes Unknown, Entered By History   Bioflavonoid Products (VITAMIN C PLUS) 1000 MG TABS 1 tablet by Oral or FT or NG tube route 12/16/2019 at Unknown time Yes Unknown, Entered By History   Brivaracetam (BRIVIACT) 10 MG/ML solution 100 mg by Oral or Feeding Tube route 2 times daily 0900, 2100 12/16/2019 at am Yes Unknown, Entered By History   calcium carbonate 1250 (500 CA) MG/5ML SUSP suspension 5 mLs (1,250 mg) by Per J Tube route 3 times daily (with meals) 12/16/2019 at am Yes Mariana Venegas MD   carBAMazepine (TEGRETOL) 100 MG/5ML suspension 150 mg by Oral or Feeding Tube route every 6 hours At 06:00, 12:00, 18:00 and 24:00 for seizures  12/16/2019 at 1800 Yes Unknown, Entered By History   ferrous sulfate 220 (44 Fe) MG/5ML ELIX 220 mg by Per  Feeding Tube route daily 12/16/2019 at am Yes Unknown, Entered By History   Guar Gum (FIBER MODULAR, NUTRISOURCE FIBER,) packet 1 packet by Per G Tube route daily 12/16/2019 at am Yes Starr Champion MD   hydrocortisone (CORTEF) 5 MG tablet 10 mg by Oral or FT or NG tube route daily (with dinner) At 1500 12/16/2019 at 1500 Yes Unknown, Entered By History   hydrocortisone (CORTEF) 5 MG tablet 20 mg by Oral or FT or NG tube route every morning  12/16/2019 at am Yes Unknown, Entered By History   levothyroxine (SYNTHROID/LEVOTHROID) 150 MCG tablet Take 150 mcg by mouth every morning 12/16/2019 at am Yes Unknown, Entered By History   melatonin (MELATONIN) 1 MG/ML LIQD liquid 6 mg by Per NG tube route At Bedtime  12/15/2019 at Unknown time Yes Unknown, Entered By History   metoclopramide (REGLAN) 5 MG/5ML solution 5 mg by Per Feeding Tube route 2 times daily 12/16/2019 at am Yes Unknown, Entered By History   pantoprazole (PROTONIX) 2 mg/mL SUSP suspension 20 mLs (40 mg) by Per J Tube route daily 12/16/2019 at am Yes Washington Connors MD   potassium & sodium phosphates (NEUTRA-PHOS) 280-160-250 MG Packet Take 1 packet by mouth 2 times daily  12/16/2019 at am Yes Yanely Liriano MD   Scopolamine HBr POWD Dispense #90. Mix contents with small amount of water for admin via J-tube.  Administer 0.8 mg three times each day. 12/16/2019 at am Yes Jennie Bermudez MD   sodium bicarbonate 650 MG tablet Take 1 tablet (650 mg) by mouth daily 12/16/2019 at am Yes Trierweiler, Chad A, MD   testosterone cypionate (DEPOTESTOTERONE CYPIONATE) 200 MG/ML injection Inject 76 mg into the muscle See Admin Instructions Every 2 weeks on Fridays   76 mg or 0.38 mL 12/6/2019 at Unknown time Yes Unknown, Entered By History   vitamin D3 (CHOLECALCIFEROL) 2000 units (50 mcg) tablet Take 2,000 Units by mouth daily Crush and feed via j-tube @@ 0900 12/16/2019 at am Yes Unknown, Entered By History   bacitracin ointment Apply topically daily as  needed for wound care To PEG site.  prn  Unknown, Entered By History   hydrocortisone 1 % CREA cream Place rectally 2 times daily as needed for other Apply to reddened memo areas as needed prn  Unknown, Entered By History   miconazole (MICATIN) 2 % AERP powder Apply topically 2 times daily as needed  prn  Unknown, Entered By History   mupirocin (BACTROBAN) 2 % external ointment Apply topically 2 times daily as needed  prn  Reported, Patient   order for DME Equipment being ordered: Nebulizer   Starr Champion MD   Skin Protectants, Misc. (BALMEX SKIN PROTECTANT) OINT Externally apply topically 2 times daily as needed (irritation) Applay to reddened memo areas twice daily as needed prn  Unknown, Entered By History

## 2019-12-17 NOTE — PROGRESS NOTES
.RECEIVING UNIT ED HANDOFF REVIEW    ED Nurse Handoff Report was reviewed by: Edgar Vidales RN on December 16, 2019 at 7:32 PM       Notes read and orders reviewed.   Room 611 being set up for pt.

## 2019-12-17 NOTE — PROGRESS NOTES
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  Present on admission.  Per report, pt has not been as helpful or as compliant as usual with repositioning lately, possibly due to being ill.  Possible fungal component.  Will start antifungal powder and order a Pulsate air mattress.      Groin folds are moist and denuded.  Penis shaft with linear dry abrasions from prior condom cath; pt was frequently pulling at the catheter per Nursing and it has since been removed.      Treatment Plan  Perineal cares:  BID and prn with diaper changes:   1.  Cleanse perineal area and groin folds with Rita perineal lotion and soft dry cloths.   2.  Apply antifungal powder to coccyx/gluteal cleft and to groin folds.  Rub in the powder well, until there is just a thin even layer everywhere.   3.  Apply a thin glaze of Critic-aid barrier paste to perianal area, coccyx, and inner buttocks. (apply over the powder)  4.  Leave open to air; no Mepilex.     5.  PIP measures.     Pressure Injury Prevention (PIP) Plan:  If patient is declining pressure injury prevention interventions: Explore reason why and address patient's concerns and Ensure Care team is aware ( provider, charge nurse, etc)  Mattress: Pulsate air mattress  HOB: Maintain at or below 30 degrees, unless contraindicated  Repositioning in bed: Every 1-2 hours , Left/right positioning; avoid supine and Raise foot of bed prior to raising head of bed, to reduce patient sliding down (shear)  Heels: Keep elevated off mattress and Pillows under calves  Protective Dressing: None  Positioning Equipment: None  Chair positioning: n/a   If patient has a buttock pressure injury, or high risk for PI use chair cushion or SPS.  Moisture Management: Perineal cleansing /protection: Follow  Incontinence Protocol, Clean and dry skin folds with bathing  and Moisturize dry skin  Under Devices: Inspect skin under all medical devices during skin inspection , Ensure tubes are stabilized without tension and Ensure patient is not lying on medical devices or equipment when repositioned  Ask provider to discontinue device when no longer needed.      Orders Written  WOC Nurse follow-up plan: weekly  Nursing to notify the Provider(s) and re-consult the WOC Nurse if wound(s) deteriorates or new skin concern.    Patient History  According to provider note(s):  Summary: Keyon Farias is a 59 year old male with PMH cardiac arrest, TBI, seizures, septic shock, nonverbal baseline status, aspiration pneumonia and pnuemonitis, who was admitted on 12/16/2019 with weakness, fever, SOB, likely HCAP.     Objective Data  Containment of urine/stool: Incontinence Protocol and Diaper    Active Diet Order:  Orders Placed This Encounter      NPO for Medical/Clinical Reasons Except for: Meds    Output:   I/O last 3 completed shifts:  In: 1841 [I.V.:1641; NG/GT:200]  Out: 150 [Urine:150]    Risk Assessment:   Sensory Perception: 3-->slightly limited  Moisture: 3-->occasionally moist  Activity: 2-->chairfast  Mobility: 2-->very limited  Nutrition: 3-->adequate  Friction and Shear: 2-->potential problem  Ricci Score: 15                          Labs:   Recent Labs   Lab 12/17/19  0927  12/16/19  1717   ALBUMIN  --   --  3.1*   HGB 11.2*   < >  --    WBC 6.5   < >  --     < > = values in this interval not displayed.       Physical Exam  Skin inspection: focused coccyx, buttocks, groin, penis    Wound Location:  Coccyx/perianal area  Date of last photo:  12-17-19      Wound History: present on admission.  Pt has been seen many times by [facility] service in the past; has chronic skin issues to this area.  Skin looks worse in general than previous admissions, though per report pt's mom says it is actually improved from recently.  Small smear of  soft light brown stool at assessment, brief was wet with urine.  Pt needs assist x 2 for repositioning.  Pt has long jagged fingernails, may be scratching at his skin.   Measurements (length x width x depth, in cm):  Scattered excoriations along perianal area, up to 3cm long, 1cm wide, 0.2cm depth.  Coccyx crease with small opening approx 1 x 0.5 x 0.2cm.   Wound Base: pink and yellow-tan semi-moist tissue  Tunneling: N/A  Undermining: N/A  Palpation of the wound bed: normal   Periwound skin: denuded, erythema- blanchable, irritant dermatitis and rash with a few satellite lesions  Color: pink  Temperature: normal   Drainage: scant  Description of drainage: serous  Odor: none  Pain: tender    Groin folds:  Macerated pink moist tissue  Penile shaft:  Nearly circumferential linear pin-width pink irritation, no drainage.  No special cares needed.  Would avoid classic condom catheter for now.     Interventions  Current support surface: Standard  Atmos Air mattress - ordered Pulsate low air loss mattress today 12-17  Current off-loading measures: Pillows  Visual inspection of wound(s) completed  Wound Care: done per plan of care  Supplies: reviewed  Education provided: importance of repositioning, plan of care, wound progress, Moisture management, Hygiene and Off-loading pressure  Discussed plan of care with Patient and Nurse    Kristie Norris RN

## 2019-12-17 NOTE — ED NOTES
"[facility]  ED Nurse Handoff Report    ED Chief complaint: Fever      ED Diagnosis:   Final diagnoses:   Pneumonia of both lower lobes due to infectious organism (H)       Code Status: Full Code    Allergies:   Allergies   Allergen Reactions     Dilantin [Phenytoin Sodium]      Valproic Acid      Toxicity c bone marrow suspension, elevated ammonia levels        Activity level - Baseline/Home:  Total Care  Activity Level - Current:   Total Care    Patient's Preferred language: English   Needed?: No    Isolation: Yes  Infection: MRSA  VRE  Bariatric?: No    Vital Signs:   Vitals:    12/16/19 1648 12/16/19 1800 12/16/19 1830 12/16/19 1837   BP: 109/89 118/79 (!) 115/91    Pulse:  112 113    Resp: 18 21 15 10   Temp: 98.2  F (36.8  C)      TempSrc: Oral      SpO2: 93% 95% 95% 98%   Height: 1.778 m (5' 10\")          Cardiac Rhythm: ,   Cardiac  Cardiac Rhythm: Sinus tachycardia    Pain level: 0-10 Pain Scale: 0    Is this patient confused?: No   Does this patient have a guardian?  Yes         If yes, is there guardianship documents in the Epic \"Code/ACP\" activity?  Yes         Guardian Notified?  Yes  [place] - Suicide Severity Rating Scale Completed?  No, secondary to Mental status  If yes, what color did the patient score?  N/A    Patient Report: Initial Complaint: Keyon Farias is a 59 year old male who presents to the Emergency Department for an evaluation of fever and respiratory distress.  Focused Assessment: Cardiac: ST  Resp: Nonproductive frequent cough, LS's diminished and course bilaterally.   Neuro: Pt at baseline per mother, increased weakness  Tests Performed: labs, cxr  Abnormal Results: see labs and imaging  Treatments provided: NS 2000ml bolus, zosyn 4.5g, vancomycin 1750 mg    Family Comments: mother at bedside    OBS brochure/video discussed/provided to patient/family: No              Name of person given brochure if not patient: na              Relationship to patient: " na    ED Medications:   Medications   piperacillin-tazobactam (ZOSYN) 4.5 g vial to attach to  mL bag (has no administration in time range)   vancomycin (VANCOCIN) 1,750 mg in sodium chloride 0.9 % 500 mL intermittent infusion (has no administration in time range)   0.9% sodium chloride BOLUS (2,000 mLs Intravenous New Bag 12/16/19 3183)       Drips infusing?:  Yes    For the majority of the shift this patient was Green.   Interventions performed were meds and monitor.    Severe Sepsis OR Septic Shock Diagnosis Present: No    To be done/followed up on inpatient unit:  continue care    ED NURSE PHONE NUMBER: *[phone]

## 2019-12-17 NOTE — PLAN OF CARE
.DATE & TIME: 12/16 2300 - 12/17 0734    Cognitive Concerns/ Orientation : Alert, uses hand gestures for answering yes and no questions.   BEHAVIOR & AGGRESSION TOOL COLOR: Green   CIWA SCORE: N/A    ABNL VS/O2: VSS on RA   MOBILITY: Total cares, lift sheet on bed. Turn/Repo Q2h   PAIN MANAGMENT: Denies  DIET: NPO, nutrition to see pt. Medications through feeding tube.   BOWEL/BLADDER: Incont of bowel and bladder, pt removed condom cath, kept off d/t redness/sore to penis.   ABNL LAB/BG: WDL                                                                                                                                             DRAIN/DEVICES: PIV LUE infusing NS at 75mL/hr with intermittent antibiotics   TELEMETRY RHYTHM: NSR   SKIN: Wound on buttocks and penis. WOC RN consulted.   TESTS/PROCEDURES:   D/C DAY/GOALS/PLACE: Pending   OTHER IMPORTANT INFO: Contact precautions, MRSA & VRE.  NPO. HOB >30 degrees. Wound, nutrition & speech consulted.

## 2019-12-18 LAB
BACTERIA SPEC CULT: NORMAL
GLUCOSE BLDC GLUCOMTR-MCNC: 174 MG/DL (ref 70–99)
GLUCOSE BLDC GLUCOMTR-MCNC: 200 MG/DL (ref 70–99)
Lab: NORMAL
MAGNESIUM SERPL-MCNC: 2.2 MG/DL (ref 1.6–2.3)
SPECIMEN SOURCE: NORMAL

## 2019-12-18 PROCEDURE — 00000146 ZZHCL STATISTIC GLUCOSE BY METER IP

## 2019-12-18 PROCEDURE — 94640 AIRWAY INHALATION TREATMENT: CPT

## 2019-12-18 PROCEDURE — 25000125 ZZHC RX 250: Performed by: INTERNAL MEDICINE

## 2019-12-18 PROCEDURE — 12000000 ZZH R&B MED SURG/OB

## 2019-12-18 PROCEDURE — 99232 SBSQ HOSP IP/OBS MODERATE 35: CPT | Performed by: INTERNAL MEDICINE

## 2019-12-18 PROCEDURE — 25800030 ZZH RX IP 258 OP 636: Performed by: HOSPITALIST

## 2019-12-18 PROCEDURE — 83735 ASSAY OF MAGNESIUM: CPT | Performed by: HOSPITALIST

## 2019-12-18 PROCEDURE — 40000275 ZZH STATISTIC RCP TIME EA 10 MIN

## 2019-12-18 PROCEDURE — 94640 AIRWAY INHALATION TREATMENT: CPT | Mod: 76

## 2019-12-18 PROCEDURE — 25000132 ZZH RX MED GY IP 250 OP 250 PS 637: Mod: GY | Performed by: INTERNAL MEDICINE

## 2019-12-18 PROCEDURE — 36415 COLL VENOUS BLD VENIPUNCTURE: CPT | Performed by: HOSPITALIST

## 2019-12-18 PROCEDURE — 25000125 ZZHC RX 250: Performed by: HOSPITALIST

## 2019-12-18 PROCEDURE — 25000128 H RX IP 250 OP 636: Performed by: HOSPITALIST

## 2019-12-18 PROCEDURE — 25000132 ZZH RX MED GY IP 250 OP 250 PS 637: Mod: GY | Performed by: HOSPITALIST

## 2019-12-18 RX ADMIN — Medication 0.8 MG: at 17:42

## 2019-12-18 RX ADMIN — Medication 1 PACKET: at 08:43

## 2019-12-18 RX ADMIN — ACETYLCYSTEINE 2 ML: 200 SOLUTION ORAL; RESPIRATORY (INHALATION) at 11:00

## 2019-12-18 RX ADMIN — HYDROCORTISONE 20 MG: 20 TABLET ORAL at 08:31

## 2019-12-18 RX ADMIN — Medication 0.8 MG: at 21:38

## 2019-12-18 RX ADMIN — BRIVARACETAM 100 MG: 10 SOLUTION ORAL at 21:38

## 2019-12-18 RX ADMIN — ALBUTEROL SULFATE 2.5 MG: 2.5 SOLUTION RESPIRATORY (INHALATION) at 19:52

## 2019-12-18 RX ADMIN — MELATONIN 6 MG: 3 TAB ORAL at 21:38

## 2019-12-18 RX ADMIN — HYDROCORTISONE 10 MG: 10 TABLET ORAL at 17:42

## 2019-12-18 RX ADMIN — MICONAZOLE NITRATE: 20 POWDER TOPICAL at 05:32

## 2019-12-18 RX ADMIN — PIPERACILLIN SODIUM AND TAZOBACTAM SODIUM 4.5 G: 4; .5 INJECTION, POWDER, LYOPHILIZED, FOR SOLUTION INTRAVENOUS at 21:37

## 2019-12-18 RX ADMIN — MICONAZOLE NITRATE: 20 POWDER TOPICAL at 07:10

## 2019-12-18 RX ADMIN — PIPERACILLIN SODIUM AND TAZOBACTAM SODIUM 4.5 G: 4; .5 INJECTION, POWDER, LYOPHILIZED, FOR SOLUTION INTRAVENOUS at 14:20

## 2019-12-18 RX ADMIN — ASPIRIN 81 MG 81 MG: 81 TABLET ORAL at 08:31

## 2019-12-18 RX ADMIN — MICONAZOLE NITRATE: 20 POWDER TOPICAL at 01:15

## 2019-12-18 RX ADMIN — MICONAZOLE NITRATE: 20 POWDER TOPICAL at 08:52

## 2019-12-18 RX ADMIN — MINERAL SUPPLEMENT IRON 300 MG / 5 ML STRENGTH LIQUID 100 PER BOX UNFLAVORED 220 MG: at 08:31

## 2019-12-18 RX ADMIN — ALBUTEROL SULFATE 2.5 MG: 2.5 SOLUTION RESPIRATORY (INHALATION) at 15:27

## 2019-12-18 RX ADMIN — PIPERACILLIN SODIUM AND TAZOBACTAM SODIUM 4.5 G: 4; .5 INJECTION, POWDER, LYOPHILIZED, FOR SOLUTION INTRAVENOUS at 08:29

## 2019-12-18 RX ADMIN — ALBUTEROL SULFATE 2.5 MG: 2.5 SOLUTION RESPIRATORY (INHALATION) at 07:33

## 2019-12-18 RX ADMIN — MELATONIN 2000 UNITS: at 08:31

## 2019-12-18 RX ADMIN — LEVOTHYROXINE SODIUM 150 MCG: 150 TABLET ORAL at 08:31

## 2019-12-18 RX ADMIN — Medication 40 MG: at 08:30

## 2019-12-18 RX ADMIN — SODIUM CHLORIDE: 9 INJECTION, SOLUTION INTRAVENOUS at 10:27

## 2019-12-18 RX ADMIN — SODIUM BICARBONATE 650 MG TABLET 650 MG: at 08:30

## 2019-12-18 RX ADMIN — ACETYLCYSTEINE: 200 SOLUTION ORAL; RESPIRATORY (INHALATION) at 07:33

## 2019-12-18 RX ADMIN — ALBUTEROL SULFATE 2.5 MG: 2.5 SOLUTION RESPIRATORY (INHALATION) at 11:00

## 2019-12-18 RX ADMIN — ACETYLCYSTEINE 2 ML: 200 SOLUTION ORAL; RESPIRATORY (INHALATION) at 15:26

## 2019-12-18 RX ADMIN — ACETYLCYSTEINE 2 ML: 200 SOLUTION ORAL; RESPIRATORY (INHALATION) at 19:53

## 2019-12-18 RX ADMIN — CARBAMAZEPINE 150 MG: 100 SUSPENSION ORAL at 17:41

## 2019-12-18 RX ADMIN — METOCLOPRAMIDE 5 MG: 5 SOLUTION ORAL at 21:38

## 2019-12-18 RX ADMIN — POTASSIUM & SODIUM PHOSPHATES POWDER PACK 280-160-250 MG 1 PACKET: 280-160-250 PACK at 17:41

## 2019-12-18 RX ADMIN — CARBAMAZEPINE 150 MG: 100 SUSPENSION ORAL at 00:40

## 2019-12-18 RX ADMIN — CARBAMAZEPINE 150 MG: 100 SUSPENSION ORAL at 06:42

## 2019-12-18 RX ADMIN — Medication 0.8 MG: at 08:30

## 2019-12-18 RX ADMIN — METOCLOPRAMIDE 5 MG: 5 SOLUTION ORAL at 08:30

## 2019-12-18 RX ADMIN — BRIVARACETAM 100 MG: 10 SOLUTION ORAL at 08:32

## 2019-12-18 RX ADMIN — CARBAMAZEPINE 150 MG: 100 SUSPENSION ORAL at 12:08

## 2019-12-18 RX ADMIN — POTASSIUM & SODIUM PHOSPHATES POWDER PACK 280-160-250 MG 1 PACKET: 280-160-250 PACK at 08:30

## 2019-12-18 ASSESSMENT — ACTIVITIES OF DAILY LIVING (ADL)
ADLS_ACUITY_SCORE: 43
ADLS_ACUITY_SCORE: 47
ADLS_ACUITY_SCORE: 43

## 2019-12-18 ASSESSMENT — MIFFLIN-ST. JEOR: SCORE: 1597.83

## 2019-12-18 NOTE — PROGRESS NOTES
[facility]    Hospitalist Progress Note    Interval History   - Doing better today, giving thumbs up to most questions, appears to be in a good mood  - Updated mother Savannah, she prefers discharge tomorrow, I think patient is ready to go today however given his multiple admissions this year due to sepsis I think an extra day of IV antibiotics is reasonable    Assessment & Plan   Summary: Keyon Farias is a 59 year old male with PMH cardiac arrest, TBI, seizures, septic shock, nonverbal baseline status, aspiration pneumonia and pnuemonitis, who was admitted on 12/16/2019 with weakness, fever, SOB, likely HCAP.     Suspected healthcare associated pneumonia versus aspiration pneumonia, recurrent  Patient presented with with mother today due to weakness, fever, shortness of breath, with ongoing fever.  History of present illness is limited due to patient with traumatic brain injury and review of systems is limited due to patient with cognitive impairment. Long history of aspiration pneumonia and pneumonitis.  - Continue Zosyn, transition to Augmentin at discharge  - Speech therapy  - HOB elevation orders, aspiration precautions  - Continue prior to admission Mucomyst/albuterol nebulizer treatments    Tube feeding: Restarted here. 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 Outpatient neurology follow-up with Dr. Phylicia Stout of [facility]  - PTA Tegretol, Briviact     Perianal pressure injury with skin tear: Many previous admissions with wounds, high risk for wound development from pressure and immobility.   - Miconazole powder  - Wound nurse consulted for follow up.   - Frequent repositioning     Skin breakdown on penis: Noted on previous admission between border of shaft of penis and glans penis  on the right. Suspect secondary to condom catheter.  - Wound nurse consulted.  - Uses condom cath at home per report    DVT Prophylaxis: Pneumatic Compression Devices  Code Status: Full Code  PT/OT: not needed    Disposition: Expected discharge tomorrow    Yoni Vicente MD  Text Page  (7am to 6pm)  -Data reviewed today: I reviewed all new labs and imaging results over the last 24 hours.    Physical Exam   Temp: (P) 98.3  F (36.8  C) Temp src: (P) Oral BP: (P) 119/63 Pulse: (P) 104 Heart Rate: 82 Resp: (P) 20 SpO2: (P) 94 % O2 Device: (P) None (Room air)    Vitals:    12/17/19 0547 12/18/19 0543   Weight: 75 kg (165 lb 6.4 oz) 76.7 kg (169 lb)     Vital Signs with Ranges  Temp:  [98.3  F (36.8  C)-99.6  F (37.6  C)] (P) 98.3  F (36.8  C)  Pulse:  [104] (P) 104  Heart Rate:  [] 82  Resp:  [16-22] (P) 20  BP: ()/(46-51) (P) 119/63  SpO2:  [93 %-95 %] (P) 94 %  I/O last 3 completed shifts:  In: 1911 [I.V.:1641; NG/GT:270]  Out: -   O2 requirements: none    Constitutional: Male in NAD  HEENT: Atraumatic, no scleral icterus  Cardiovascular: RRR, normal S1/2, no m/r/g  Respiratory: CTAB, no wheezing or crackles  Vascular: No LE pitting edema  GI: G tube noted  Skin/Integumen: No rashes  Neuro/Psych: Awake, alert, nonverbal    Medications     IV fluid REPLACEMENT ONLY       sodium chloride 75 mL/hr at 12/16/19 2208       acetylcysteine  2 mL Nebulization 4x Daily     albuterol  2.5 mg Nebulization Q4H While awake     aspirin  81 mg Oral or Feeding Tube Daily     Brivaracetam  100 mg Oral or Feeding Tube BID     carBAMazepine  150 mg Oral or Feeding Tube Q6H     ferrous sulfate  220 mg Per Feeding Tube Daily     fiber modular (NUTRISOURCE FIBER)  1 packet Per G Tube Daily     hydrocortisone  10 mg Per Feeding Tube Daily with supper     hydrocortisone  20 mg Per Feeding Tube QAM     levothyroxine  150 mcg Oral QAM     melatonin  6 mg Per NG tube At Bedtime     metoclopramide  5 mg Per Feeding Tube BID      pantoprazole  40 mg Per J Tube Daily     piperacillin-tazobactam  4.5 g Intravenous Q6H     potassium & sodium phosphates  1 packet Oral BID     Scopolamine HBr  0.8 mg Feeding TID     sodium bicarbonate  650 mg Oral Daily     sodium chloride (PF)  3 mL Intracatheter Q8H     Vitamin D3  2,000 Units Per Feeding Tube Daily       Data   Recent Labs   Lab 12/17/19  0927 12/16/19  1805 12/16/19  1717   WBC 6.5 10.1  --    HGB 11.2* 12.7*  --    MCV 83 82  --     265  --      --  142   POTASSIUM 3.8  --  4.4   CHLORIDE 111*  --  106   CO2 26  --  28   BUN 17  --  20   CR 0.93  --  0.88   ANIONGAP 4  --  8   STEVE 7.9*  --  8.7   GLC 85  --  207*   ALBUMIN  --   --  3.1*   PROTTOTAL  --   --  8.0   BILITOTAL  --   --  0.3   ALKPHOS  --   --  108   ALT  --   --  45   AST  --   --  43       Imaging:   No results found for this or any previous visit (from the past 24 hour(s)).

## 2019-12-18 NOTE — PLAN OF CARE
HECTOR orientation, non-verbal; speech incomprehensible. Calm and cooperative for most cares, can become irritable with position changes/incontinence care. Perineal area excoriated, small open areas. Incontinent B/B; BM X 3. T&R maintained. TF running at goal rate. IVF's running. IV Zosyn continued. Infrequent, congested cough. Plan to switch to oral abx with possible discharge home tomorrow.

## 2019-12-18 NOTE — PLAN OF CARE
DATE & TIME: 12/17/19, 2300 - 1362    Cognitive Concerns/ Orientation : HECTOR, Patient in non verbal. Nods heads to yes and no questions or uses thumbs up   BEHAVIOR & AGGRESSION TOOL COLOR: Green  CIWA SCORE: N/a   ABNL VS/O2: BP soft. Other VSS on room air  MOBILITY: Total care. Up with lift. Turned and repositioned in bed  PAIN MANAGMENT: Denied  DIET: NPO. Tube feed to commence at 0700.  BOWEL/BLADDER: Incontinent of B/B  ABNL LAB/BG: N/a  DRAIN/DEVICES: PEG-tube, PIV infusing NS at 75 ml/hr  TELEMETRY RHYTHM: NSR  SKIN: Buttocks, red and excoriated. Penis and scrotum red. Jessica care done with each incontinence  TESTS/PROCEDURES: N/a  D/C DAY/GOALS/PLACE: Pending  OTHER IMPORTANT INFO: Contact precautions maintained. WOC and Nutrition consulted

## 2019-12-18 NOTE — PLAN OF CARE
DATE & TIME: 12/17/19 8817-0632       Cognitive Concerns/ Orientation : Alert, nonverbal. Nods head to yes or no questions, or thumbs up. Pt quite lethargic at the beginning of shift, woke up around 2100 with more energy.   BEHAVIOR & AGGRESSION TOOL COLOR: Green   CIWA SCORE: N/A      ABNL VS/O2: VSS on RA   MOBILITY: Total care. Turn/Repo Q2h   PAIN MANAGMENT: Denies  DIET: NPO, TF 115ml/hr - stopped at 2000, started late today, mom wanted to finish bag. Restart tomorrow at 0700  BOWEL/BLADDER: Incont of bowel and bladder.  ABNL LAB/BG: Hgb 11.2    DRAIN/DEVICES: IVF infusing at 75mL/hr   TELEMETRY RHYTHM: NSR   SKIN: Redness and excoriation on buttocks and penis.  TESTS/PROCEDURES:   D/C DAY/GOALS/PLACE: Pending   OTHER IMPORTANT INFO: Contact precautions, MRSA & VRE. Tube feeding 115ml/hr 7am-7pm q4 60ml  flush.  WOC and Nutrition consulted. IV antibiotics.

## 2019-12-18 NOTE — PROGRESS NOTES
Planning hospital discharge tomorrow.  Please see Care Coordinator consult completed 12/17 for more information.  Stretcher transport has been set up at 10:30AM tomorrow through [employer] [employer].  Conversed with pt's Mother Savannah.  She will contact Keyon's home care nurse.  Discharge orders will need to be faxed to [facility] (P:[phone]) (Fax:[phone]).  Called [facility] to schedule follow up appointment and Rissa, the , noted the system would not allow her to schedule a hospital follow up with this provider.  This was difficult to understand as there was never a problem in the past.  This clinic will investigate further and call Savannah with appointment time.  She wanted a Monday afternoon, so pt could still go to his day program T-F.  New scripts to be filled here and sent with pt, also, Savannah wanted to make sure the medication that came in with him gets sent home with him. She was not able to tell writer what the medication was.

## 2019-12-19 VITALS
WEIGHT: 169 LBS | OXYGEN SATURATION: 97 % | TEMPERATURE: 98 F | HEART RATE: 104 BPM | HEIGHT: 70 IN | BODY MASS INDEX: 24.2 KG/M2 | RESPIRATION RATE: 18 BRPM | DIASTOLIC BLOOD PRESSURE: 60 MMHG | SYSTOLIC BLOOD PRESSURE: 121 MMHG

## 2019-12-19 LAB — GLUCOSE BLDC GLUCOMTR-MCNC: 157 MG/DL (ref 70–99)

## 2019-12-19 PROCEDURE — 25000128 H RX IP 250 OP 636: Performed by: HOSPITALIST

## 2019-12-19 PROCEDURE — 25000132 ZZH RX MED GY IP 250 OP 250 PS 637: Mod: GY | Performed by: HOSPITALIST

## 2019-12-19 PROCEDURE — 40000275 ZZH STATISTIC RCP TIME EA 10 MIN

## 2019-12-19 PROCEDURE — 94640 AIRWAY INHALATION TREATMENT: CPT

## 2019-12-19 PROCEDURE — 25800030 ZZH RX IP 258 OP 636: Performed by: HOSPITALIST

## 2019-12-19 PROCEDURE — 99238 HOSP IP/OBS DSCHRG MGMT 30/<: CPT | Performed by: INTERNAL MEDICINE

## 2019-12-19 PROCEDURE — 00000146 ZZHCL STATISTIC GLUCOSE BY METER IP

## 2019-12-19 PROCEDURE — 25000125 ZZHC RX 250: Performed by: HOSPITALIST

## 2019-12-19 PROCEDURE — 25000125 ZZHC RX 250: Performed by: INTERNAL MEDICINE

## 2019-12-19 RX ORDER — LEVOTHYROXINE SODIUM 150 UG/1
150 TABLET ORAL EVERY MORNING
Status: DISCONTINUED | OUTPATIENT
Start: 2019-12-20 | End: 2019-12-19 | Stop reason: HOSPADM

## 2019-12-19 RX ADMIN — Medication 40 MG: at 08:22

## 2019-12-19 RX ADMIN — SODIUM BICARBONATE 650 MG TABLET 650 MG: at 08:22

## 2019-12-19 RX ADMIN — ALBUTEROL SULFATE 2.5 MG: 2.5 SOLUTION RESPIRATORY (INHALATION) at 07:36

## 2019-12-19 RX ADMIN — LEVOTHYROXINE SODIUM 150 MCG: 150 TABLET ORAL at 08:22

## 2019-12-19 RX ADMIN — SODIUM CHLORIDE: 9 INJECTION, SOLUTION INTRAVENOUS at 04:54

## 2019-12-19 RX ADMIN — HYDROCORTISONE 20 MG: 20 TABLET ORAL at 08:22

## 2019-12-19 RX ADMIN — PIPERACILLIN SODIUM AND TAZOBACTAM SODIUM 4.5 G: 4; .5 INJECTION, POWDER, LYOPHILIZED, FOR SOLUTION INTRAVENOUS at 04:53

## 2019-12-19 RX ADMIN — MINERAL SUPPLEMENT IRON 300 MG / 5 ML STRENGTH LIQUID 100 PER BOX UNFLAVORED 220 MG: at 08:23

## 2019-12-19 RX ADMIN — BRIVARACETAM 100 MG: 10 SOLUTION ORAL at 09:23

## 2019-12-19 RX ADMIN — PIPERACILLIN SODIUM AND TAZOBACTAM SODIUM 4.5 G: 4; .5 INJECTION, POWDER, LYOPHILIZED, FOR SOLUTION INTRAVENOUS at 08:23

## 2019-12-19 RX ADMIN — ASPIRIN 81 MG 81 MG: 81 TABLET ORAL at 08:22

## 2019-12-19 RX ADMIN — MICONAZOLE NITRATE: 20 POWDER TOPICAL at 07:01

## 2019-12-19 RX ADMIN — Medication 1 PACKET: at 08:27

## 2019-12-19 RX ADMIN — CARBAMAZEPINE 150 MG: 100 SUSPENSION ORAL at 07:00

## 2019-12-19 RX ADMIN — POTASSIUM & SODIUM PHOSPHATES POWDER PACK 280-160-250 MG 1 PACKET: 280-160-250 PACK at 08:22

## 2019-12-19 RX ADMIN — METOCLOPRAMIDE 5 MG: 5 SOLUTION ORAL at 08:23

## 2019-12-19 RX ADMIN — MELATONIN 2000 UNITS: at 08:22

## 2019-12-19 RX ADMIN — Medication 0.8 MG: at 08:24

## 2019-12-19 RX ADMIN — ACETYLCYSTEINE 2 ML: 200 SOLUTION ORAL; RESPIRATORY (INHALATION) at 07:36

## 2019-12-19 RX ADMIN — CARBAMAZEPINE 150 MG: 100 SUSPENSION ORAL at 00:49

## 2019-12-19 ASSESSMENT — ACTIVITIES OF DAILY LIVING (ADL)
ADLS_ACUITY_SCORE: 43
ADLS_ACUITY_SCORE: 47
ADLS_ACUITY_SCORE: 43

## 2019-12-19 NOTE — PROGRESS NOTES
Paged MD for discharge orders, notified of transportation at 1030 am today.  New meds to be filled here.

## 2019-12-19 NOTE — PLAN OF CARE
Discharge    Patient discharged to home via transport with self    Listed belongings gathered and returned to patient. Yes  Care Plan and Patient education resolved: Yes  Prescriptions if needed, hard copies sent with patient  Yes  Home and hospital acquired medications returned to patient: Yes  Medication Bin checked and emptied on discharge Yes  Follow up appointment made for patient: Yes, see notes    Cognitive Concerns/ Orientation : HECTOR, Patient nonverbal. Nods heads to yes and no questions or uses thumbs up   BEHAVIOR & AGGRESSION TOOL COLOR: Green  CIWA SCORE: N/a    ABNL VS/O2: VSS on room air  MOBILITY: Total care. Up with lift. Turned and repositioned in bed  PAIN MANAGMENT: Denied  DIET: NPO. Tube feed.  BOWEL/BLADDER: Incontinent of B/B  ABNL LAB/BG: N/a  DRAIN/DEVICES: PEG-tube patent, PIV removed.  TELEMETRY RHYTHM: NSR  SKIN: Buttocks, red and excoriated. Penis and scrotum red. Jessica care done with each incontinence.  Overnight reports improvements.  Today is day 1 with writer.  TESTS/PROCEDURES: N/a  D/C DAY/GOALS/PLACE: today, home.  CC to call daughter for questions.    OTHER IMPORTANT INFO: Contact precautions maintained.

## 2019-12-19 NOTE — PLAN OF CARE
DATE & TIME: 12/18/19, 6310 - 7908                       Cognitive Concerns/ Orientation : HECTOR, Patient in non verbal. Nods heads to yes and no questions or uses thumbs up   BEHAVIOR & AGGRESSION TOOL COLOR: Green  CIWA SCORE: N/a    ABNL VS/O2: VSS on room air  MOBILITY: Total care. Up with lift. Turned and repositioned in bed  PAIN MANAGMENT: Denied  DIET: NPO. Tube feed to commence at 0700.  BOWEL/BLADDER: Incontinent of B/B  ABNL LAB/BG: N/a  DRAIN/DEVICES: PEG-tube, PIV infusing NS at 75 ml/hr  TELEMETRY RHYTHM: NSR  SKIN: Buttocks, red and excoriated. Penis and scrotum red. Jessica care done with each incontinence  TESTS/PROCEDURES: N/a  D/C DAY/GOALS/PLACE: Pending  OTHER IMPORTANT INFO: Contact precautions maintained.

## 2019-12-19 NOTE — DISCHARGE SUMMARY
[facility]    Hospitalist Discharge Summary       Date of Admission:  12/16/2019  Date of Discharge:  12/19/2019  Discharging Provider: Yoni Vicente MD      Discharge Diagnoses   Probable aspiration pneumonia, recurrent    Follow-ups Needed After Discharge   Follow-up Appointments     Follow-up and recommended labs and tests       Follow up with primary care provider, Carlos Gomez, within 7 days for   hospital follow- up and to follow up on results.  No follow up labs or   test are needed.  - Follow up with infectious disease clinic in 2-4 weeks           Unresulted Labs Ordered in the Past 30 Days of this Admission     Date and Time Order Name Status Description    12/16/2019 1715 Blood culture Preliminary     12/16/2019 1715 Blood culture Preliminary       These results will be followed up by PCP    Hospital Course   Keyon Farias is a 59 year old male with PMH cardiac arrest, TBI, seizures, septic shock, nonverbal baseline status, aspiration pneumonia and pnuemonitis, who was admitted on 12/16/2019 with weakness, fever, SOB, found to have pneumonia. Patient has been admitted over 15 times this year, the majority of admissions related to sepsis, and aspiration pneumonia or pneumonitis. Patient presented with with mother due to weakness, fever, shortness of breath, and fever. Patient was confused and had a borderline leukocytosis and tachycardia. He was initially treated with Zosyn which resulted in a fairly rapid and significant improvement in his mental status. Patient was NPO this admission and received feeding tubes. I reviewed the chart and it's not clear how to further decrease his risk for aspiration, I will defer to his PCP for further evaluation. However, I think there could be a role for suppressive antibiotics potentially, so I will place an infectious disease consult.    Consultations This Hospital Stay   PHARMACY TO DOSE VANCO  PHARMACY TO DOSE VANCO  SPEECH LANGUAGE PATH ADULT  IP CONSULT  NUTRITION SERVICES ADULT IP CONSULT  WOUND OSTOMY CONTINENCE NURSE  IP CONSULT  PHARMACY IP CONSULT  CARE TRANSITION RN/SW IP CONSULT  CARE COORDINATOR IP CONSULT    Code Status   Full Code    Time Spent on this Encounter   I, Yoni Vicente, personally saw the patient today and spent approximately 20 minutes discharging this patient.       Yoni Vicente MD  [facility]  ______________________________________________________________________    Physical Exam   Vital Signs: Temp: 98  F (36.7  C) Temp src: Axillary BP: 121/60 Pulse: 104 Heart Rate: 71 Resp: 18 SpO2: 97 % O2 Device: None (Room air)    Weight: 169 lbs 0 oz    Constitutional: Male in NAD  HEENT: Atraumatic, no scleral icterus  Cardiovascular: RRR, normal S1/2, no m/r/g  Respiratory: CTAB, no wheezing or crackles  Vascular: No LE pitting edema  GI: G tube noted  Skin/Integumen: No rashes  Neuro/Psych: Awake, alert, nonverbal, appears to be in a happy mood       Primary Care Physician   Carlos Gomez    Discharge Disposition   Discharged to home  Condition at discharge: Stable    Significant Results and Procedures   Most Recent 3 CBC's:  Recent Labs   Lab Test 12/17/19 0927 12/16/19  1805 11/10/19  2238   WBC 6.5 10.1 13.8*   HGB 11.2* 12.7* 11.2*   MCV 83 82 80    265 191     Most Recent 3 BMP's:  Recent Labs   Lab Test 12/17/19  0927 12/16/19  1717 11/15/19  2200    142 124*   POTASSIUM 3.8 4.4 4.1   CHLORIDE 111* 106 91*   CO2 26 28 29   BUN 17 20 13   CR 0.93 0.88 0.65*   ANIONGAP 4 8 4   STEVE 7.9* 8.7 8.1*   GLC 85 207* 169*     Most Recent 2 LFT's:  Recent Labs   Lab Test 12/16/19  1717 11/10/19  2238   AST 43 25   ALT 45 25   ALKPHOS 108 93   BILITOTAL 0.3 0.3   ,   Results for orders placed or performed during the hospital encounter of 12/16/19   Chest XR,  PA & LAT    Narrative    XR CHEST TWO VIEWS   12/16/2019 5:50 PM     HISTORY: Check for pneumonia, fever and hypoxia.    COMPARISON: Chest x-ray on  11/11/2019      Impression    IMPRESSION: AP and lateral views of the chest were obtained.  Cardiomediastinal silhouette is within normal limits. Low lung volume.  Bibasilar pulmonary opacities, left slightly greater than right, could  represent atelectasis versus infection. No significant pleural  effusion or pneumothorax.    DIANE DON MD       Discharge Orders      Infectious Disease Referral      Reason for your hospital stay    You were hospitalized for aspiration pneumonia.     Follow-up and recommended labs and tests     Follow up with primary care provider, Carlos Gomez, within 7 days for hospital follow- up and to follow up on results.  No follow up labs or test are needed.  - Follow up with infectious disease clinic in 2-4 weeks     Activity    Your activity upon discharge: activity as tolerated     When to contact your care team    Call your primary doctor if you have any of the following: fever of >100.4F, or increasing confusion     Discharge Instructions     Full Code     Diet    Follow this diet upon discharge:      Adult Formula Drip Feeding: Continuous Isosource 1.5; Jejunostomy; Goal Rate: 115; mL/hr; From: 7:00 AM; 7:00 PM; Medication - Feeding Tube Flush Frequency: At least 15-30 mL water before and after medication administration and with tube clogging;...      NPO for Medical/Clinical Reasons Except for: Meds     Discharge Medications   Current Discharge Medication List      START taking these medications    Details   amoxicillin-clavulanate (AUGMENTIN) 875-125 MG tablet Take 1 tablet by mouth 2 times daily for 5 days  Qty: 10 tablet, Refills: 0    Associated Diagnoses: Aspiration, chronic pulmonary, initial encounter         CONTINUE these medications which have NOT CHANGED    Details   acetylcysteine (MUCOMYST) 20 % neb solution Take 2 mLs by nebulization 4 times daily With albuterol at 0700, 1100, 1500, and 1900       albuterol (PROVENTIL) (5 MG/ML) 0.5% neb solution Take 2.5 mg by  nebulization every 4 hours (while awake) 0700 1100 1500 1900 with mucomyst       aspirin (ASA) 81 MG chewable tablet 81 mg by Oral or Feeding Tube route daily At 0900      Bioflavonoid Products (VITAMIN C PLUS) 1000 MG TABS 1 tablet by Oral or FT or NG tube route      Brivaracetam (BRIVIACT) 10 MG/ML solution 100 mg by Oral or Feeding Tube route 2 times daily 0900, 2100      calcium carbonate 1250 (500 CA) MG/5ML SUSP suspension 5 mLs (1,250 mg) by Per J Tube route 3 times daily (with meals)  Qty: 450 mL    Associated Diagnoses: Malnutrition (H)      carBAMazepine (TEGRETOL) 100 MG/5ML suspension 150 mg by Oral or Feeding Tube route every 6 hours At 06:00, 12:00, 18:00 and 24:00 for seizures       ferrous sulfate 220 (44 Fe) MG/5ML ELIX 220 mg by Per Feeding Tube route daily      Guar Gum (FIBER MODULAR, NUTRISOURCE FIBER,) packet 1 packet by Per G Tube route daily  Qty: 30 packet, Refills: 0    Associated Diagnoses: Pneumonia of both lower lobes due to infectious organism (H)      !! hydrocortisone (CORTEF) 5 MG tablet 10 mg by Oral or FT or NG tube route daily (with dinner) At 1500      !! hydrocortisone (CORTEF) 5 MG tablet 20 mg by Oral or FT or NG tube route every morning       levothyroxine (SYNTHROID/LEVOTHROID) 150 MCG tablet Take 150 mcg by mouth every morning      melatonin (MELATONIN) 1 MG/ML LIQD liquid 6 mg by Per NG tube route At Bedtime       metoclopramide (REGLAN) 5 MG/5ML solution 5 mg by Per Feeding Tube route 2 times daily      pantoprazole (PROTONIX) 2 mg/mL SUSP suspension 20 mLs (40 mg) by Per J Tube route daily  Qty: 400 mL, Refills: 1    Associated Diagnoses: Gastroesophageal reflux disease, esophagitis presence not specified      potassium & sodium phosphates (NEUTRA-PHOS) 280-160-250 MG Packet Take 1 packet by mouth 2 times daily       Scopolamine HBr POWD Dispense #90. Mix contents with small amount of water for admin via J-tube.  Administer 0.8 mg three times each day.  Qty: 90 Bottle,  Refills: 11    Associated Diagnoses: Aspiration pneumonia (H)      sodium bicarbonate 650 MG tablet Take 1 tablet (650 mg) by mouth daily  Qty: 30 tablet, Refills: 0    Associated Diagnoses: Hyponatremia      testosterone cypionate (DEPOTESTOTERONE CYPIONATE) 200 MG/ML injection Inject 76 mg into the muscle See Admin Instructions Every 2 weeks on Fridays   76 mg or 0.38 mL      vitamin D3 (CHOLECALCIFEROL) 2000 units (50 mcg) tablet Take 2,000 Units by mouth daily Crush and feed via j-tube @@ 0900      bacitracin ointment Apply topically daily as needed for wound care To PEG site.       hydrocortisone 1 % CREA cream Place rectally 2 times daily as needed for other Apply to reddened memo areas as needed      miconazole (MICATIN) 2 % AERP powder Apply topically 2 times daily as needed       mupirocin (BACTROBAN) 2 % external ointment Apply topically 2 times daily as needed       order for DME Equipment being ordered: Nebulizer  Qty: 1 Units, Refills: 0    Associated Diagnoses: Pneumonia of both lower lobes due to infectious organism (H)      Skin Protectants, Misc. (BALMEX SKIN PROTECTANT) OINT Externally apply topically 2 times daily as needed (irritation) Applay to reddened memo areas twice daily as needed       !! - Potential duplicate medications found. Please discuss with provider.        Allergies   Allergies   Allergen Reactions     Dilantin [Phenytoin Sodium]      Valproic Acid      Toxicity c bone marrow suspension, elevated ammonia levels

## 2019-12-19 NOTE — PLAN OF CARE
DATE & TIME: 12/17/19 8544-3960                 Cognitive Concerns/ Orientation : HECTOR, Patient in non verbal. Nods heads to yes and no questions or uses thumbs up   BEHAVIOR & AGGRESSION TOOL COLOR: Green  CIWA SCORE: N/a    ABNL VS/O2: BP soft. Other VSS on room air  MOBILITY: Total care. Up with lift. Turn/Repo q2h.   PAIN MANAGMENT: Denied  DIET: NPO. Tube feed from 7a-7p.  BOWEL/BLADDER: Incontinent of B/B. Pt had several loose stools this evening.   ABNL LAB/BG: N/a  DRAIN/DEVICES: PEG-tube, PIV infusing NS at 75 ml/hr  TELEMETRY RHYTHM: NSR  SKIN: Buttocks, red and excoriated. Penis and scrotum red. Jessica care done with each incontinence. Pt gets slightly combative during changes.   TESTS/PROCEDURES: N/a  D/C DAY/GOALS/PLACE: Tomorrow AM.

## 2019-12-19 NOTE — CONSULTS
Care Coordination:    Pt discharge to home per HE at 10:30.  Orders faxed to [facility] ( [phone]) Left message for  Lisa ( [phone]).  Updated Guardian/Mother Savannah by phone.  She would like Mask, antibiotic, Pt's own med scopolamine, and supplies from room sent with patient.  Bedside nurse Kolby updated.  Reviewed follow up appts and that clinic would contact her. Referral made to ID.  Savannah will follow up with clinic or [facility] after seeing PCP.  Savannah voices no other concerns for discharge.         Alee Sousa RN BSN  Inpatient Care Coordination  [facility]  [phone]

## 2019-12-23 ENCOUNTER — HOSPITAL ENCOUNTER (INPATIENT)
Facility: CLINIC | Age: 59
LOS: 2 days | Discharge: HOME | DRG: 178 | End: 2019-12-25
Attending: EMERGENCY MEDICINE | Admitting: STUDENT IN AN ORGANIZED HEALTH CARE EDUCATION/TRAINING PROGRAM
Payer: MEDICARE

## 2019-12-23 ENCOUNTER — APPOINTMENT (OUTPATIENT)
Dept: GENERAL RADIOLOGY | Facility: CLINIC | Age: 59
DRG: 178 | End: 2019-12-23
Attending: EMERGENCY MEDICINE
Payer: MEDICARE

## 2019-12-23 DIAGNOSIS — J18.9 PNEUMONIA OF BOTH LOWER LOBES DUE TO INFECTIOUS ORGANISM: Primary | ICD-10-CM

## 2019-12-23 DIAGNOSIS — E87.1 HYPONATREMIA: ICD-10-CM

## 2019-12-23 DIAGNOSIS — A41.9 ACUTE SEPSIS (H): ICD-10-CM

## 2019-12-23 DIAGNOSIS — T17.908A: ICD-10-CM

## 2019-12-23 LAB
ALBUMIN SERPL-MCNC: 3.4 G/DL (ref 3.4–5)
ALBUMIN UR-MCNC: 30 MG/DL
ALP SERPL-CCNC: 109 U/L (ref 40–150)
ALT SERPL W P-5'-P-CCNC: 35 U/L (ref 0–70)
ANION GAP SERPL CALCULATED.3IONS-SCNC: 5 MMOL/L (ref 3–14)
APPEARANCE UR: CLEAR
AST SERPL W P-5'-P-CCNC: 26 U/L (ref 0–45)
BASOPHILS # BLD AUTO: 0.1 10E9/L (ref 0–0.2)
BASOPHILS NFR BLD AUTO: 0.7 %
BILIRUB SERPL-MCNC: 0.2 MG/DL (ref 0.2–1.3)
BILIRUB UR QL STRIP: NEGATIVE
BUN SERPL-MCNC: 23 MG/DL (ref 7–30)
CALCIUM SERPL-MCNC: 8.8 MG/DL (ref 8.5–10.1)
CHLORIDE SERPL-SCNC: 106 MMOL/L (ref 94–109)
CO2 BLDCOV-SCNC: 26 MMOL/L (ref 21–28)
CO2 SERPL-SCNC: 27 MMOL/L (ref 20–32)
COLOR UR AUTO: YELLOW
CREAT SERPL-MCNC: 0.88 MG/DL (ref 0.66–1.25)
DIFFERENTIAL METHOD BLD: ABNORMAL
EOSINOPHIL # BLD AUTO: 0.4 10E9/L (ref 0–0.7)
EOSINOPHIL NFR BLD AUTO: 3.9 %
ERYTHROCYTE [DISTWIDTH] IN BLOOD BY AUTOMATED COUNT: 17.2 % (ref 10–15)
FLUAV+FLUBV AG SPEC QL: NEGATIVE
FLUAV+FLUBV AG SPEC QL: NEGATIVE
GFR SERPL CREATININE-BSD FRML MDRD: >90 ML/MIN/{1.73_M2}
GLUCOSE SERPL-MCNC: 106 MG/DL (ref 70–99)
GLUCOSE UR STRIP-MCNC: NEGATIVE MG/DL
HCT VFR BLD AUTO: 43.7 % (ref 40–53)
HGB BLD-MCNC: 14.2 G/DL (ref 13.3–17.7)
HGB UR QL STRIP: NEGATIVE
IMM GRANULOCYTES # BLD: 0 10E9/L (ref 0–0.4)
IMM GRANULOCYTES NFR BLD: 0.2 %
KETONES UR STRIP-MCNC: NEGATIVE MG/DL
LACTATE BLD-SCNC: 1.6 MMOL/L (ref 0.7–2.1)
LEUKOCYTE ESTERASE UR QL STRIP: NEGATIVE
LYMPHOCYTES # BLD AUTO: 2.8 10E9/L (ref 0.8–5.3)
LYMPHOCYTES NFR BLD AUTO: 25.5 %
MCH RBC QN AUTO: 26.1 PG (ref 26.5–33)
MCHC RBC AUTO-ENTMCNC: 32.5 G/DL (ref 31.5–36.5)
MCV RBC AUTO: 80 FL (ref 78–100)
MONOCYTES # BLD AUTO: 1.8 10E9/L (ref 0–1.3)
MONOCYTES NFR BLD AUTO: 15.9 %
NEUTROPHILS # BLD AUTO: 5.9 10E9/L (ref 1.6–8.3)
NEUTROPHILS NFR BLD AUTO: 53.8 %
NITRATE UR QL: NEGATIVE
PCO2 BLDV: 40 MM HG (ref 40–50)
PH BLDV: 7.41 PH (ref 7.32–7.43)
PH UR STRIP: 7 PH (ref 5–7)
PLATELET # BLD AUTO: 247 10E9/L (ref 150–450)
PLATELET # BLD EST: ABNORMAL 10*3/UL
PO2 BLDV: 66 MM HG (ref 25–47)
POTASSIUM SERPL-SCNC: 3.8 MMOL/L (ref 3.4–5.3)
PROT SERPL-MCNC: 8.4 G/DL (ref 6.8–8.8)
RBC # BLD AUTO: 5.44 10E12/L (ref 4.4–5.9)
RBC #/AREA URNS AUTO: 1 /HPF (ref 0–2)
RBC MORPH BLD: ABNORMAL
SAO2 % BLDV FROM PO2: 93 %
SODIUM SERPL-SCNC: 138 MMOL/L (ref 133–144)
SOURCE: ABNORMAL
SP GR UR STRIP: 1.03 (ref 1–1.03)
SPECIMEN SOURCE: NORMAL
UROBILINOGEN UR STRIP-MCNC: 2 MG/DL (ref 0–2)
WBC # BLD AUTO: 11 10E9/L (ref 4–11)
WBC #/AREA URNS AUTO: <1 /HPF (ref 0–5)

## 2019-12-23 PROCEDURE — 99223 1ST HOSP IP/OBS HIGH 75: CPT | Mod: AI | Performed by: STUDENT IN AN ORGANIZED HEALTH CARE EDUCATION/TRAINING PROGRAM

## 2019-12-23 PROCEDURE — 87086 URINE CULTURE/COLONY COUNT: CPT | Performed by: EMERGENCY MEDICINE

## 2019-12-23 PROCEDURE — 99285 EMERGENCY DEPT VISIT HI MDM: CPT | Mod: 25

## 2019-12-23 PROCEDURE — 25800030 ZZH RX IP 258 OP 636: Performed by: STUDENT IN AN ORGANIZED HEALTH CARE EDUCATION/TRAINING PROGRAM

## 2019-12-23 PROCEDURE — 81001 URINALYSIS AUTO W/SCOPE: CPT | Performed by: EMERGENCY MEDICINE

## 2019-12-23 PROCEDURE — 25000128 H RX IP 250 OP 636: Performed by: EMERGENCY MEDICINE

## 2019-12-23 PROCEDURE — 87040 BLOOD CULTURE FOR BACTERIA: CPT | Performed by: EMERGENCY MEDICINE

## 2019-12-23 PROCEDURE — 12000000 ZZH R&B MED SURG/OB

## 2019-12-23 PROCEDURE — 94640 AIRWAY INHALATION TREATMENT: CPT

## 2019-12-23 PROCEDURE — 96366 THER/PROPH/DIAG IV INF ADDON: CPT

## 2019-12-23 PROCEDURE — 82803 BLOOD GASES ANY COMBINATION: CPT

## 2019-12-23 PROCEDURE — 25000128 H RX IP 250 OP 636: Performed by: STUDENT IN AN ORGANIZED HEALTH CARE EDUCATION/TRAINING PROGRAM

## 2019-12-23 PROCEDURE — 25000132 ZZH RX MED GY IP 250 OP 250 PS 637: Mod: GY | Performed by: STUDENT IN AN ORGANIZED HEALTH CARE EDUCATION/TRAINING PROGRAM

## 2019-12-23 PROCEDURE — 25000125 ZZHC RX 250: Performed by: STUDENT IN AN ORGANIZED HEALTH CARE EDUCATION/TRAINING PROGRAM

## 2019-12-23 PROCEDURE — 25000132 ZZH RX MED GY IP 250 OP 250 PS 637: Mod: GY | Performed by: EMERGENCY MEDICINE

## 2019-12-23 PROCEDURE — 96365 THER/PROPH/DIAG IV INF INIT: CPT

## 2019-12-23 PROCEDURE — 85025 COMPLETE CBC W/AUTO DIFF WBC: CPT | Performed by: EMERGENCY MEDICINE

## 2019-12-23 PROCEDURE — 96360 HYDRATION IV INFUSION INIT: CPT | Mod: 59

## 2019-12-23 PROCEDURE — 83605 ASSAY OF LACTIC ACID: CPT

## 2019-12-23 PROCEDURE — 87804 INFLUENZA ASSAY W/OPTIC: CPT | Performed by: EMERGENCY MEDICINE

## 2019-12-23 PROCEDURE — 40000275 ZZH STATISTIC RCP TIME EA 10 MIN

## 2019-12-23 PROCEDURE — 71046 X-RAY EXAM CHEST 2 VIEWS: CPT

## 2019-12-23 PROCEDURE — 80053 COMPREHEN METABOLIC PANEL: CPT | Performed by: EMERGENCY MEDICINE

## 2019-12-23 PROCEDURE — 96367 TX/PROPH/DG ADDL SEQ IV INF: CPT

## 2019-12-23 PROCEDURE — 25800030 ZZH RX IP 258 OP 636: Performed by: EMERGENCY MEDICINE

## 2019-12-23 PROCEDURE — 25000125 ZZHC RX 250

## 2019-12-23 RX ORDER — HYDROCORTISONE 10 MG/1
10 TABLET ORAL
Status: DISCONTINUED | OUTPATIENT
Start: 2019-12-23 | End: 2019-12-25 | Stop reason: HOSPADM

## 2019-12-23 RX ORDER — ACETAMINOPHEN 325 MG/1
650 TABLET ORAL ONCE
Status: COMPLETED | OUTPATIENT
Start: 2019-12-23 | End: 2019-12-23

## 2019-12-23 RX ORDER — ALBUTEROL SULFATE 0.83 MG/ML
SOLUTION RESPIRATORY (INHALATION)
Status: COMPLETED
Start: 2019-12-23 | End: 2019-12-23

## 2019-12-23 RX ORDER — HYDROCORTISONE 20 MG/1
20 TABLET ORAL EVERY MORNING
Status: DISCONTINUED | OUTPATIENT
Start: 2019-12-24 | End: 2019-12-25 | Stop reason: HOSPADM

## 2019-12-23 RX ORDER — ONDANSETRON 2 MG/ML
4 INJECTION INTRAMUSCULAR; INTRAVENOUS EVERY 6 HOURS PRN
Status: DISCONTINUED | OUTPATIENT
Start: 2019-12-23 | End: 2019-12-25 | Stop reason: HOSPADM

## 2019-12-23 RX ORDER — PROCHLORPERAZINE MALEATE 5 MG
10 TABLET ORAL EVERY 6 HOURS PRN
Status: DISCONTINUED | OUTPATIENT
Start: 2019-12-23 | End: 2019-12-25 | Stop reason: HOSPADM

## 2019-12-23 RX ORDER — PROCHLORPERAZINE 25 MG
25 SUPPOSITORY, RECTAL RECTAL EVERY 12 HOURS PRN
Status: DISCONTINUED | OUTPATIENT
Start: 2019-12-23 | End: 2019-12-25 | Stop reason: HOSPADM

## 2019-12-23 RX ORDER — ASPIRIN 81 MG/1
81 TABLET, CHEWABLE ORAL DAILY
Status: DISCONTINUED | OUTPATIENT
Start: 2019-12-24 | End: 2019-12-25 | Stop reason: HOSPADM

## 2019-12-23 RX ORDER — CARBAMAZEPINE 100 MG/5ML
150 SUSPENSION ORAL EVERY 6 HOURS
Status: DISCONTINUED | OUTPATIENT
Start: 2019-12-23 | End: 2019-12-25 | Stop reason: HOSPADM

## 2019-12-23 RX ORDER — ONDANSETRON 4 MG/1
4 TABLET, ORALLY DISINTEGRATING ORAL EVERY 6 HOURS PRN
Status: DISCONTINUED | OUTPATIENT
Start: 2019-12-23 | End: 2019-12-25 | Stop reason: HOSPADM

## 2019-12-23 RX ORDER — SODIUM CHLORIDE, SODIUM LACTATE, POTASSIUM CHLORIDE, CALCIUM CHLORIDE 600; 310; 30; 20 MG/100ML; MG/100ML; MG/100ML; MG/100ML
INJECTION, SOLUTION INTRAVENOUS CONTINUOUS
Status: DISCONTINUED | OUTPATIENT
Start: 2019-12-23 | End: 2019-12-25

## 2019-12-23 RX ORDER — METOCLOPRAMIDE HYDROCHLORIDE 5 MG/5ML
5 SOLUTION ORAL 2 TIMES DAILY
Status: DISCONTINUED | OUTPATIENT
Start: 2019-12-23 | End: 2019-12-25 | Stop reason: HOSPADM

## 2019-12-23 RX ORDER — SCOPOLAMINE HYDROBROMIDE
0.8 POWDER (GRAM) MISCELLANEOUS 3 TIMES DAILY
Status: DISCONTINUED | OUTPATIENT
Start: 2019-12-23 | End: 2019-12-25 | Stop reason: HOSPADM

## 2019-12-23 RX ORDER — ACETYLCYSTEINE 200 MG/ML
2 SOLUTION ORAL; RESPIRATORY (INHALATION) 4 TIMES DAILY
Status: DISCONTINUED | OUTPATIENT
Start: 2019-12-23 | End: 2019-12-25 | Stop reason: HOSPADM

## 2019-12-23 RX ORDER — LIDOCAINE 40 MG/G
CREAM TOPICAL
Status: DISCONTINUED | OUTPATIENT
Start: 2019-12-23 | End: 2019-12-25 | Stop reason: HOSPADM

## 2019-12-23 RX ORDER — PIPERACILLIN SODIUM, TAZOBACTAM SODIUM 3; .375 G/15ML; G/15ML
3.38 INJECTION, POWDER, LYOPHILIZED, FOR SOLUTION INTRAVENOUS EVERY 6 HOURS
Status: DISCONTINUED | OUTPATIENT
Start: 2019-12-23 | End: 2019-12-25 | Stop reason: HOSPADM

## 2019-12-23 RX ORDER — ACETAMINOPHEN 650 MG/1
650 SUPPOSITORY RECTAL ONCE
Status: DISCONTINUED | OUTPATIENT
Start: 2019-12-23 | End: 2019-12-23

## 2019-12-23 RX ORDER — LEVOTHYROXINE SODIUM 150 UG/1
150 TABLET ORAL EVERY MORNING
Status: DISCONTINUED | OUTPATIENT
Start: 2019-12-24 | End: 2019-12-25 | Stop reason: HOSPADM

## 2019-12-23 RX ORDER — NALOXONE HYDROCHLORIDE 0.4 MG/ML
.1-.4 INJECTION, SOLUTION INTRAMUSCULAR; INTRAVENOUS; SUBCUTANEOUS
Status: DISCONTINUED | OUTPATIENT
Start: 2019-12-23 | End: 2019-12-25 | Stop reason: HOSPADM

## 2019-12-23 RX ORDER — PIPERACILLIN SODIUM, TAZOBACTAM SODIUM 4; .5 G/20ML; G/20ML
4.5 INJECTION, POWDER, LYOPHILIZED, FOR SOLUTION INTRAVENOUS ONCE
Status: COMPLETED | OUTPATIENT
Start: 2019-12-23 | End: 2019-12-23

## 2019-12-23 RX ORDER — LANOLIN ALCOHOL/MO/W.PET/CERES
6 CREAM (GRAM) TOPICAL AT BEDTIME
Status: DISCONTINUED | OUTPATIENT
Start: 2019-12-23 | End: 2019-12-25 | Stop reason: HOSPADM

## 2019-12-23 RX ADMIN — ACETYLCYSTEINE 2 ML: 200 SOLUTION ORAL; RESPIRATORY (INHALATION) at 19:44

## 2019-12-23 RX ADMIN — ACETAMINOPHEN 650 MG: 325 TABLET, FILM COATED ORAL at 11:52

## 2019-12-23 RX ADMIN — SODIUM CHLORIDE 2000 ML: 9 INJECTION, SOLUTION INTRAVENOUS at 12:50

## 2019-12-23 RX ADMIN — METOCLOPRAMIDE 5 MG: 5 SOLUTION ORAL at 21:17

## 2019-12-23 RX ADMIN — SODIUM CHLORIDE, POTASSIUM CHLORIDE, SODIUM LACTATE AND CALCIUM CHLORIDE: 600; 310; 30; 20 INJECTION, SOLUTION INTRAVENOUS at 21:15

## 2019-12-23 RX ADMIN — HYDROCORTISONE 10 MG: 10 TABLET ORAL at 18:09

## 2019-12-23 RX ADMIN — BRIVARACETAM 100 MG: 10 SOLUTION ORAL at 21:19

## 2019-12-23 RX ADMIN — VANCOMYCIN HYDROCHLORIDE 1750 MG: 5 INJECTION, POWDER, LYOPHILIZED, FOR SOLUTION INTRAVENOUS at 11:55

## 2019-12-23 RX ADMIN — ALBUTEROL SULFATE 2.5 MG: 2.5 SOLUTION RESPIRATORY (INHALATION) at 19:43

## 2019-12-23 RX ADMIN — CARBAMAZEPINE 150 MG: 100 SUSPENSION ORAL at 18:09

## 2019-12-23 RX ADMIN — PIPERACILLIN SODIUM AND TAZOBACTAM SODIUM 4.5 G: 4; .5 INJECTION, POWDER, LYOPHILIZED, FOR SOLUTION INTRAVENOUS at 11:37

## 2019-12-23 RX ADMIN — PIPERACILLIN AND TAZOBACTAM 3.38 G: 3; .375 INJECTION, POWDER, LYOPHILIZED, FOR SOLUTION INTRAVENOUS at 18:16

## 2019-12-23 ASSESSMENT — ACTIVITIES OF DAILY LIVING (ADL)
ADLS_ACUITY_SCORE: 47
ADLS_ACUITY_SCORE: 47

## 2019-12-23 NOTE — PROGRESS NOTES
Patient arrived from ER this afternoon.  VSS, denies pain.  Total cares.  LR infusing.  Bed alarm on.  Denies SOB.  NPO. PCD's on.  IV Zosyn scheduled.

## 2019-12-23 NOTE — ED PROVIDER NOTES
History     Chief Complaint:  Fever       HPI     History limited secondary to patient's nonverbal status.     Keyon Farias is a 59 year old male who presents via EMS with fever. He has a history of TBI and recurring aspiration pneumonia, recently hospitalized from 12/16/19-12/19/19. He was initially treated with Zosyn with improvement and discharged on Augmentin 5 days BID. Today he was found to have a fever of 101.5 and EMS was called. He normally gets 1200 during his tube feedings but only received 300 today. No vomiting or abdominal pain.     Allergies:  Dilantin  Valproic Acid     Medications:    Mucomyst  Albuterol  Aspirin 81 mg  Briviact  Tegretol  Iron  Cortef  Duo neb  Synthroid  Melatonin  Reglan  Protonix  Depotestosterone  Sodium bicarbonate  Bactroban       Past Medical History:    Aphasia  TBI  DVT  GERD  Panhypopituitarism  Pneumonia  Seizures  Septic shock  Spastic hemiplegia  Thyroid disease  Tracheostomy care  Stroke  UTI  Ventricular fibrillation  Ventricular tachyarrhythmia  Seizures   Pancreatitis  Asthma  Cardiac arrest  SIRS  Encephalopathy  Aspiration pneumonitis      Past Surgical History:    GI endoscopy  EGD x4  Head and neck surgery  Jejunostomy tube change x4  PICC exchange  Appendectomy  Gastrotomy  Ortho surgery  Tracheostomy x2  Vascular surgery     Family History:    Cancer     Social History:  Patient presents via EMS.   Smoking Status: Former Smoker  Smokeless Tobacco: Never Used  Alcohol Use: Negative   Drug Use: Negative  PCP: Carlos Gomez   Marital Status:  Single        Review of Systems   Unable to perform ROS: Patient nonverbal     Physical Exam     Patient Vitals for the past 24 hrs:   BP Temp Temp src Heart Rate Resp SpO2 Weight   12/23/19 1058 124/79 101.5  F (38.6  C) Rectal 80 18 96 % 74.8 kg (165 lb)       Physical Exam  Nursing note and vitals reviewed.  Constitutional:  Appears well-developed and well-nourished. Productive-sounding cough.   HENT:   Head:     Atraumatic.   Mouth/Throat:   Dry mucous membranes with dried mucous on back of his soft palate. No oropharyngeal exudate.   Eyes:    Pupils are equal, round, and reactive to light.   Neck:    Normal range of motion. Neck supple.      No tracheal deviation present. No thyromegaly present.   Cardiovascular:  Normal rate, regular rhythm, no murmur   Pulmonary/Chest: Bibasilar crackles.   Abdominal:   Soft. Bowel sounds are normal. Exhibits no distension and      no mass. There is no tenderness.      There is no rebound and no guarding.      G tube site is clean and dry without any surround redness  Musculoskeletal:  Exhibits no edema.   Lymphadenopathy:  No cervical adenopathy.   Neurological:   Awake and alert. Nonverbal. Consistent with his baseline. Diffuse muscle wasting, consistent with history of TBI, as per baseline.   Skin:    Skin is warm and dry. No pallor. Mild erythema to sacral region without warmth     Emergency Department Course     Imaging:  Radiology findings were communicated with the patient who voiced understanding of the findings.    XR Chest 2 Views   Preliminary Result   IMPRESSION: Suboptimal inspiration. Bibasilar linear atelectasis or   fibrosis is suspected. The upper lungs appear clear. No pleural   effusion.        The above imaging workup was performed.     Laboratory:  Laboratory findings were communicated with the patient who voiced understanding of the findings.    ISTAT gases lactate gabe POCT: pH 7.41, pCO2 40, pO2 66 (H), HCO3 26, Lactate 1.6  CBC:  WBC 11.0, HGB 14.2,   CMP: Glucose 106 (H), otherwise WNL (Creatinine 0.88)  Blood cultures: Pending  Influenza A/B antigen: Negative  UA: Pending   Urine culture: Pending    Interventions:  1137: Zosyn, 4.5 g, IV  1152: Tylenol, 650 mg, G tube  1155: Vancomycin, 1750 mg, IV  Ordered: Normal Saline, 1 liter, IV bolus     Emergency Department Course:  Past medical records, nursing notes, and vitals reviewed.    1050: I performed  an exam of the patient as documented above.     IV was inserted and blood was drawn for laboratory testing, results above.  The patient provided a urine sample here in the emergency department. This was sent for laboratory testing, findings above.  The patient was sent for a chest XR while in the emergency department, results above.     Findings and plan explained to the Patient and mother who consents to admission.   1226: Discussed the patient with Dr. Torres, who will admit the patient to an adult med bed for further monitoring, evaluation, and treatment.    1229: I rechecked the patient and discussed the results of his workup thus far with the mother.     I personally reviewed the laboratory and imaging results with the Patient and mother and answered all related questions prior to admission.     Impression & Plan     Medical Decision Making:  This patient has a febrile illness concerning for sepsis, most likely due to recurrence of aspiration pneumonia considering his productive-sounding cough and history. He was given Zosyn and vancomycin through IV and admitted to the care of the hospitalist, Dr. Torres.     Diagnosis:     ICD-10-CM    1. Acute sepsis (H) A41.9        Disposition:  Admitted to an adult med bed.      Scribe Disclosure:  Annie DYKES, am serving as a scribe at 10:50 AM on 12/23/2019 to document services personally performed by Dr. Miles based on my observations and the provider's statements to me.      Sahara Miles MD  12/23/19 8544

## 2019-12-23 NOTE — ED NOTES
Bed: ED23  Expected date: 12/23/19  Expected time: 10:39 AM  Means of arrival: Ambulance  Comments:  Edinkalyani 59m fever, pneumonia

## 2019-12-23 NOTE — PROGRESS NOTES
.Admission    Patient arrives to room 608 via cart from er.  Care plan note:  Not completed    Inpatient nursing criteria listed below were met:    PCD's Documented: Yes  Skin issues/needs documented :NA  Isolation education started/completed Yes  Patient allergies verified with patient: Yes  Verified completion of [place] Risk Assessment Tool:  Yes  Verified completion of Guardianship screening tool: Yes  Fall Prevention: Care plan updated, Education given and documented Yes  Care Plan initiated: Yes  Home medications documented in belongings flowsheet: NA  Patient belongings documented in belongings flowsheet: Yes  Reminder note (belongings/ medications) placed in discharge instructions:NA  Admission profile/ required documentation complete: No  Bedside Report Letter given and explained to patient No

## 2019-12-23 NOTE — PHARMACY-ADMISSION MEDICATION HISTORY
Pharmacy Medication History  Admission medication history interview status for the 12/23/2019  admission is complete. See EPIC admission navigator for prior to admission medications     Medication history sources: patient's mother and discharge notes  Medication history source reliability: Good  Adherence assessment: Good    Significant changes made to the medication list:  Not using duonebs or transderm scopolamine patch      Additional medication history information:   none    Medication reconciliation completed by provider prior to medication history? No    Time spent in this activity: 20 minutes      Prior to Admission medications    Medication Sig Last Dose Taking? Auth Provider   acetylcysteine (MUCOMYST) 20 % neb solution Take 2 mLs by nebulization 4 times daily With albuterol at 0700, 1100, 1500, and 1900  12/23/2019 at am Yes Unknown, Entered By History   albuterol (PROVENTIL) (5 MG/ML) 0.5% neb solution Take 2.5 mg by nebulization every 4 hours (while awake) 0700 1100 1500 1900 with mucomyst  12/23/2019 at am Yes Unknown, Entered By History   amoxicillin-clavulanate (AUGMENTIN) 875-125 MG tablet Take 1 tablet by mouth 2 times daily for 5 days 12/22/2019 Yes Yoni Vicente MD   aspirin (ASA) 81 MG chewable tablet 81 mg by Oral or Feeding Tube route daily At 0900 12/23/2019 at am Yes Unknown, Entered By History   bacitracin ointment Apply topically daily as needed for wound care To PEG site.   Yes Unknown, Entered By History   Bioflavonoid Products (VITAMIN C PLUS) 1000 MG TABS 1 tablet by Oral or FT or NG tube route 12/23/2019 at am Yes Unknown, Entered By History   Brivaracetam (BRIVIACT) 10 MG/ML solution 100 mg by Oral or Feeding Tube route 2 times daily 0900, 2100 12/23/2019 at am Yes Unknown, Entered By History   calcium carbonate 1250 (500 CA) MG/5ML SUSP suspension 5 mLs (1,250 mg) by Per J Tube route 3 times daily (with meals) 12/23/2019 at am Yes Mariana Venegas MD   carBAMazepine  (TEGRETOL) 100 MG/5ML suspension 150 mg by Oral or Feeding Tube route every 6 hours At 06:00, 12:00, 18:00 and 24:00 for seizures  12/23/2019 at am Yes Unknown, Entered By History   ferrous sulfate 220 (44 Fe) MG/5ML ELIX 220 mg by Per Feeding Tube route daily 12/23/2019 at am Yes Unknown, Entered By History   hydrocortisone (CORTEF) 5 MG tablet 10 mg by Oral or FT or NG tube route daily (with dinner) At 1500 Past Week at Unknown time Yes Unknown, Entered By History   hydrocortisone (CORTEF) 5 MG tablet 20 mg by Oral or FT or NG tube route every morning  12/23/2019 at am Yes Unknown, Entered By History   hydrocortisone 1 % CREA cream Place rectally 2 times daily as needed for other Apply to reddened memo areas as needed  Yes Unknown, Entered By History   levothyroxine (SYNTHROID/LEVOTHROID) 150 MCG tablet Take 150 mcg by mouth every morning 12/23/2019 at am Yes Unknown, Entered By History   melatonin (MELATONIN) 1 MG/ML LIQD liquid 6 mg by Per NG tube route At Bedtime  Past Week at Unknown time Yes Unknown, Entered By History   metoclopramide (REGLAN) 5 MG/5ML solution 5 mg by Per Feeding Tube route 2 times daily 12/23/2019 at am Yes Unknown, Entered By History   miconazole (MICATIN) 2 % AERP powder Apply topically 2 times daily as needed   Yes Unknown, Entered By History   mupirocin (BACTROBAN) 2 % external ointment Apply topically 2 times daily as needed   Yes Reported, Patient   pantoprazole (PROTONIX) 2 mg/mL SUSP suspension 20 mLs (40 mg) by Per J Tube route daily 12/23/2019 at am Yes Washington Connors MD   potassium & sodium phosphates (NEUTRA-PHOS) 280-160-250 MG Packet Take 1 packet by mouth 2 times daily  12/23/2019 at am Yes Yanely Liriano MD   Scopolamine HBr POWD Dispense #90. Mix contents with small amount of water for admin via J-tube.  Administer 0.8 mg three times each day. 12/23/2019 at am Yes Jennie Bermudez MD   Skin Protectants, Misc. (BALMEX SKIN PROTECTANT) OINT Externally apply  topically 2 times daily as needed (irritation) Applay to reddened memo areas twice daily as needed  Yes Unknown, Entered By History   sodium bicarbonate 650 MG tablet Take 1 tablet (650 mg) by mouth daily 12/23/2019 at am Yes Trierweiler, Chad A, MD   testosterone cypionate (DEPOTESTOTERONE CYPIONATE) 200 MG/ML injection Inject 76 mg into the muscle See Admin Instructions Every 2 weeks on Fridays   76 mg or 0.38 mL 12/23/2019 at am Yes Unknown, Entered By History   vitamin D3 (CHOLECALCIFEROL) 2000 units (50 mcg) tablet Take 2,000 Units by mouth daily Crush and feed via j-tube @@ 0900 12/23/2019 at am Yes Unknown, Entered By History   Guar Gum (FIBER MODULAR, NUTRISOURCE FIBER,) packet 1 packet by Per G Tube route daily   Starr Champion MD   order for DME Equipment being ordered: Nebulizer   Starr Chmapion MD

## 2019-12-23 NOTE — PROGRESS NOTES
.RECEIVING UNIT ED HANDOFF REVIEW    ED Nurse Handoff Report was reviewed by: Regina Renee RN on December 23, 2019 at 1:37 PM

## 2019-12-23 NOTE — ED NOTES
"[facility]  ED Nurse Handoff Report    ED Chief complaint: Fever (recent discharge with aspiration pneumonia, fever to 101.5 today)      ED Diagnosis:   Final diagnoses:   Acute sepsis (H)       Code Status: Full Code    Allergies:   Allergies   Allergen Reactions     Dilantin [Phenytoin Sodium]      Valproic Acid      Toxicity c bone marrow suspension, elevated ammonia levels        Activity level - Baseline/Home:  Total Care  Activity Level - Current:   Total Care    Patient's Preferred language: english   Needed?: No    Isolation: Yes  Infection: MRAS, VRE  MRSA  Bariatric?: No    Vital Signs:   Vitals:    12/23/19 1058 12/23/19 1200 12/23/19 1300 12/23/19 1314   BP: 124/79 116/88 104/68    Pulse:  119 105    Resp: 18      Temp: 101.5  F (38.6  C)   98.9  F (37.2  C)   TempSrc: Rectal   Temporal   SpO2: 96% 95% 94% 93%   Weight: 74.8 kg (165 lb)          Cardiac Rhythm: ,        Pain level:      Is this patient confused?: No   Does this patient have a guardian?  Yes         If yes, is there guardianship documents in the Epic \"Code/ACP\" activity?  Yes         Guardian Notified?  Yes  [place] - Suicide Severity Rating Scale Completed?  Yes  If yes, what color did the patient score?  White    Patient Report: Initial Complaint: 59 year old male who presents via EMS with fever. He has a history of TBI and recurring aspiration pneumonia, recently hospitalized from 12/16/19-12/19/19. He was initially treated with Zosyn with improvement and discharged on Augmentin 5 days BID. Today he was found to have a fever of 101.5 and EMS was called.   Focused Assessment: alert, responds at his baseline, makes grunts and groans follows with his eyes, upper airway secretions present required suctioning on arrival and x1, total care required, wears diaper, has GJ tube for feeding and meds  Tests Performed: labs, chest xray  Abnormal Results:   Results for orders placed or performed during the hospital " encounter of 12/23/19 (from the past 24 hour(s))   CBC with platelets differential   Result Value Ref Range    WBC 11.0 4.0 - 11.0 10e9/L    RBC Count 5.44 4.4 - 5.9 10e12/L    Hemoglobin 14.2 13.3 - 17.7 g/dL    Hematocrit 43.7 40.0 - 53.0 %    MCV 80 78 - 100 fl    MCH 26.1 (L) 26.5 - 33.0 pg    MCHC 32.5 31.5 - 36.5 g/dL    RDW 17.2 (H) 10.0 - 15.0 %    Platelet Count 247 150 - 450 10e9/L    Diff Method Automated Method     % Neutrophils 53.8 %    % Lymphocytes 25.5 %    % Monocytes 15.9 %    % Eosinophils 3.9 %    % Basophils 0.7 %    % Immature Granulocytes 0.2 %    Absolute Neutrophil 5.9 1.6 - 8.3 10e9/L    Absolute Lymphocytes 2.8 0.8 - 5.3 10e9/L    Absolute Monocytes 1.8 (H) 0.0 - 1.3 10e9/L    Absolute Eosinophils 0.4 0.0 - 0.7 10e9/L    Absolute Basophils 0.1 0.0 - 0.2 10e9/L    Abs Immature Granulocytes 0.0 0 - 0.4 10e9/L    RBC Morphology Consistent with reported results     Platelet Estimate       Automated count confirmed.  Platelet morphology is normal.   Comprehensive metabolic panel   Result Value Ref Range    Sodium 138 133 - 144 mmol/L    Potassium 3.8 3.4 - 5.3 mmol/L    Chloride 106 94 - 109 mmol/L    Carbon Dioxide 27 20 - 32 mmol/L    Anion Gap 5 3 - 14 mmol/L    Glucose 106 (H) 70 - 99 mg/dL    Urea Nitrogen 23 7 - 30 mg/dL    Creatinine 0.88 0.66 - 1.25 mg/dL    GFR Estimate >90 >60 mL/min/[1.73_m2]    GFR Estimate If Black >90 >60 mL/min/[1.73_m2]    Calcium 8.8 8.5 - 10.1 mg/dL    Bilirubin Total 0.2 0.2 - 1.3 mg/dL    Albumin 3.4 3.4 - 5.0 g/dL    Protein Total 8.4 6.8 - 8.8 g/dL    Alkaline Phosphatase 109 40 - 150 U/L    ALT 35 0 - 70 U/L    AST 26 0 - 45 U/L   Influenza A/B antigen   Result Value Ref Range    Influenza A/B Agn Specimen Nasopharyngeal     Influenza A Negative NEG^Negative    Influenza B Negative NEG^Negative   ISTAT gases lactate gabe POCT   Result Value Ref Range    Ph Venous 7.41 7.32 - 7.43 pH    PCO2 Venous 40 40 - 50 mm Hg    PO2 Venous 66 (H) 25 - 47 mm Hg     Bicarbonate Venous 26 21 - 28 mmol/L    O2 Sat Venous 93 %    Lactic Acid 1.6 0.7 - 2.1 mmol/L   XR Chest 2 Views    Narrative    CHEST TWO VIEWS  12/23/2019 11:35 AM     HISTORY: Check for pneumonia; fever and cough.    COMPARISON: 12/16/2019      Impression    IMPRESSION: Suboptimal inspiration. Bibasilar linear atelectasis or  fibrosis is suspected. The upper lungs appear clear. No pleural  effusion.   UA with Microscopic   Result Value Ref Range    Color Urine Yellow     Appearance Urine Clear     Glucose Urine Negative NEG^Negative mg/dL    Bilirubin Urine Negative NEG^Negative    Ketones Urine Negative NEG^Negative mg/dL    Specific Gravity Urine 1.028 1.003 - 1.035    Blood Urine Negative NEG^Negative    pH Urine 7.0 5.0 - 7.0 pH    Protein Albumin Urine 30 (A) NEG^Negative mg/dL    Urobilinogen mg/dL 2.0 0.0 - 2.0 mg/dL    Nitrite Urine Negative NEG^Negative    Leukocyte Esterase Urine Negative NEG^Negative    Source Catheterized Urine     WBC Urine <1 0 - 5 /HPF    RBC Urine 1 0 - 2 /HPF     Treatments provided: tylenol g-tube, zosy and vanco per IV NS bolus    Family Comments: mother/guardian at bedside    OBS brochure/video discussed/provided to patient/family: No              Name of person given brochure if not patient:               Relationship to patient:     ED Medications:   Medications   acetaminophen (TYLENOL) Suppository 650 mg (650 mg Rectal Not Given 12/23/19 1137)   vancomycin (VANCOCIN) 1,750 mg in sodium chloride 0.9 % 500 mL intermittent infusion (1,750 mg Intravenous New Bag 12/23/19 1155)   0.9% sodium chloride BOLUS (2,000 mLs Intravenous New Bag 12/23/19 1250)   piperacillin-tazobactam (ZOSYN) 4.5 g vial to attach to  mL bag (0 g Intravenous Stopped 12/23/19 1155)   acetaminophen (TYLENOL) tablet 650 mg (650 mg Per G Tube Given 12/23/19 1152)       Drips infusing?:  Yes    For the majority of the shift this patient was Green.   Interventions performed were .    Severe Sepsis OR  Septic Shock Diagnosis Present: No    To be done/followed up on inpatient unit:  Abx    ED NURSE PHONE NUMBER: *[phone]

## 2019-12-23 NOTE — H&P
[facility]    History and Physical - Hospitalist Service       Date of Admission:  12/23/2019    Assessment & Plan      Keyon Farias is a 59 year old male with PMH cardiac arrest, TBI, seizures, septic shock, nonverbal baseline status, aspiration pneumonia and pnuemonitis, who was admitted on 12/23/19 with weakness, fever, SOB, found to have persistent pneumonia.     Suspected healthcare associated pneumonia versus aspiration pneumonia, recurrent: Assessment: Patient with history of cardiac arrest, TBI, seizures, septic shock as well as previous several admissions or encephalopathy due to aspiration PNA.  History of present illness is limited due to patient with traumatic brain injury and review of systems is limited due to patient with cognitive impairment and non-verbal status. CXR shows Bibasilar linear atelectasis or fibrosis, overall presentation consistent with recurrent aspiration PNA. Patient has long history of aspiration pneumonia and pneumonitis. Hemodynamics are stable on admission.   Plan:  - admit to inpatient  - Continue IV Zosyn  - Follow BC .   - Pulmonary hygiene and speech therapy consulted.    - HOB elevation orders, aspiration precautions.   - Continue prior to admission Mucomyst/albuterol nebulizer treatments  - Continue prior to admission duo nebs  - n.p.o.      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 Phylicia Stout of [facility]     Perianal pressure injury with skin tear: Many previous admissions with wounds, high risk for wound development from pressure and immobility.   - Miconazole powder  - Frequent repositioning     Skin breakdown  on penis: Noted on previous admission between border of shaft of penis and glans penis on the right. Suspect secondary to condom catheter.  - Wound nurse consulted.   - Uses condom cath at home per report.         Diet: NPO  DVT Prophylaxis: Pneumatic Compression Devices  Lerma Catheter: not present  Code Status: FULL CODE    Disposition Plan   Expected discharge: 2 - 3 days, recommended to prior living arrangement once antibiotic plan established.  Entered: Ricky Torres MD 12/23/2019, 1:44 PM     The patient's care was discussed with the Patient, Patient's Family and ED provider.    Ricky Torres MD  [facility]    ______________________________________________________________________    Chief Complaint     Fever    History is obtained from the patient's parent(s)    History of Present Illness      Keyon Farias is a 59 year old male with past medical history of recurrent aspiration pneumonia, panhypopituitarism, epilepsy, traumatic brain injury who presents for evaluation of fever.     HPI is limited secondary to patient's nonverbal status from his traumatic brain injury.  Patient's mother reports that after being discharged from [facility] days ago, he appeared to be stable/improving.  However on the day of admission, patient's mother reported that he appeared unwell, more lethargic than baseline.  I home health care nurse did come and check his temperature which was measured at 101.5  F.  At which time EMS was activated. Otherwise mother denies any recent overt aspiration events. Currently on TF. Patient is non-verbal, no complaints verbalized.       Review of Systems    The 10 point Review of Systems is negative other than noted in the HPI or here.     Past Medical History    I have reviewed this patient's medical history and updated it with pertinent information if needed.   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)     Patient  has little porductive speech but at baseline can understand simple commands consistently     DVT of upper extremity (deep vein thrombosis) (H)      Gastro-oesophageal reflux disease      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      Pneumonia      Seizures (H)     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     Septic shock (H)      Spastic hemiplegia affecting dominant side (H)     related to wil injury     Thyroid disease      Tracheostomy care (H)      Traumatic brain injury (H) 1989     Unspecified cerebral artery occlusion with cerebral infarction 1989     UTI (urinary tract infection)      Ventricular fibrillation (H)      Ventricular tachyarrhythmia (H)        Past Surgical History   I have reviewed this patient's surgical history and updated it with pertinent information if needed.  Past Surgical History:   Procedure Laterality Date     ENDOSCOPIC ULTRASOUND UPPER GASTROINTESTINAL TRACT (GI) N/A 1/30/2017    Procedure: ENDOSCOPIC ULTRASOUND, ESOPHAGOSCOPY / UPPER GASTROINTESTINAL TRACT (GI);  Surgeon: Jus Montana MD;  [facility]     ENDOSCOPIC ULTRASOUND, ESOPHAGOSCOPY, GASTROSCOPY, DUODENOSCOPY (EGD), NECROSECTOMY N/A 2/7/2017    Procedure: ENDOSCOPIC ULTRASOUND, ESOPHAGOSCOPY, GASTROSCOPY, DUODENOSCOPY (EGD), NECROSECTOMY;  Surgeon: Jack Marcus MD;  Location: [facility] OR     ESOPHAGOSCOPY, GASTROSCOPY, DUODENOSCOPY (EGD), COMBINED  3/13/2014    Procedure: COMBINED ESOPHAGOSCOPY, GASTROSCOPY, DUODENOSCOPY (EGD), BIOPSY SINGLE OR MULTIPLE;  gastroscopy;  Surgeon: Digna Rhodes MD;  Location: [facility]     ESOPHAGOSCOPY, GASTROSCOPY, DUODENOSCOPY (EGD), COMBINED N/A 12/6/2016    Procedure: COMBINED ESOPHAGOSCOPY, GASTROSCOPY, DUODENOSCOPY (EGD);  Surgeon: Digna Rhodes MD;  Location: [facility]     ESOPHAGOSCOPY, GASTROSCOPY, DUODENOSCOPY (EGD), COMBINED N/A 2/7/2017    Procedure: COMBINED ENDOSCOPIC ULTRASOUND,  ESOPHAGOSCOPY, GASTROSCOPY, DUODENOSCOPY (EGD), FINE NEEDLE ASPIRATE/BIOPSY;  Surgeon: Too Thakur MD;  [facility]     HEAD & NECK SURGERY      reconstructive facial surgery following accident in      IR FOLLOW UP VISIT INPATIENT  2019     IR GASTRO JEJUNOSTOMY TUBE CHANGE  2018     IR GASTRO JEJUNOSTOMY TUBE CHANGE  2019     IR GASTRO JEJUNOSTOMY TUBE CHANGE  3/8/2019     IR GASTRO JEJUNOSTOMY TUBE CHANGE  2019     IR PICC EXCHANGE LEFT  8/15/2019     LAPAROSCOPIC APPENDECTOMY  2013    Procedure: LAPAROSCOPIC APPENDECTOMY;  LAPAROSCOPIC APPENDECTOMY;  Surgeon: Manish Pierce MD;  Location:  OR     LAPAROSCOPIC ASSISTED INSERTION TUBE GASTROTOMY N/A 2016    Procedure: LAPAROSCOPIC ASSISTED INSERTION TUBE GASTROSTOMY;  Surgeon: Manish Pierce MD;  Location:  OR     ORTHOPEDIC SURGERY      right hand repair     TRACHEOSTOMY N/A 9/3/2016    Procedure: TRACHEOSTOMY;  Surgeon: João Ortiz MD;  Location:  OR     TRACHEOSTOMY N/A 2016    Procedure: TRACHEOSTOMY;  Surgeon: João Ortiz MD;  Location:  OR     VASCULAR SURGERY         Social History   I have reviewed this patient's social history and updated it with pertinent information if needed.  Social History     Tobacco Use     Smoking status: Former Smoker     Last attempt to quit: 1989     Years since quittin.6     Smokeless tobacco: Never Used   Substance Use Topics     Alcohol use: No     Drug use: No     Family History   I have reviewed this patient's family history and updated it with pertinent information if needed.   Family History   Problem Relation Age of Onset     Cancer Father      Prior to Admission Medications   Prior to Admission Medications   Prescriptions Last Dose Informant Patient Reported? Taking?   Bioflavonoid Products (VITAMIN C PLUS) 1000 MG TABS 2019 at am Mother Yes Yes   Si tablet by Oral or FT or NG tube route   Brivaracetam  (BRIVIACT) 10 MG/ML solution 2019 at am Mother Yes Yes   Si mg by Oral or Feeding Tube route 2 times daily 0900, 2100   Guar Gum (FIBER MODULAR, NUTRISOURCE FIBER,) packet  Mother No No   Si packet by Per G Tube route daily   Scopolamine HBr POWD 2019 at am Mother No Yes   Sig: Dispense #90. Mix contents with small amount of water for admin via J-tube.  Administer 0.8 mg three times each day.   Skin Protectants, Misc. (BALMEX SKIN PROTECTANT) OINT  Mother Yes Yes   Sig: Externally apply topically 2 times daily as needed (irritation) Applay to reddened memo areas twice daily as needed   acetylcysteine (MUCOMYST) 20 % neb solution 2019 at am Mother Yes Yes   Sig: Take 2 mLs by nebulization 4 times daily With albuterol at 0700, 1100, 1500, and 1900    albuterol (PROVENTIL) (5 MG/ML) 0.5% neb solution 2019 at am Mother Yes Yes   Sig: Take 2.5 mg by nebulization every 4 hours (while awake) 0700 1100 1500 1900 with mucomyst    amoxicillin-clavulanate (AUGMENTIN) 875-125 MG tablet 2019 Mother No Yes   Sig: Take 1 tablet by mouth 2 times daily for 5 days   aspirin (ASA) 81 MG chewable tablet 2019 at am Mother Yes Yes   Si mg by Oral or Feeding Tube route daily At 0900   bacitracin ointment  Mother Yes Yes   Sig: Apply topically daily as needed for wound care To PEG site.    calcium carbonate 1250 (500 CA) MG/5ML SUSP suspension 2019 at am Mother No Yes   Si mLs (1,250 mg) by Per J Tube route 3 times daily (with meals)   carBAMazepine (TEGRETOL) 100 MG/5ML suspension 2019 at am Mother Yes Yes   Si mg by Oral or Feeding Tube route every 6 hours At 06:00, 12:00, 18:00 and 24:00 for seizures    ferrous sulfate 220 (44 Fe) MG/5ML ELIX 2019 at am Mother Yes Yes   Si mg by Per Feeding Tube route daily   hydrocortisone (CORTEF) 5 MG tablet Past Week at Unknown time Mother Yes Yes   Sig: 10 mg by Oral or FT or NG tube route daily (with dinner) At  1500   hydrocortisone (CORTEF) 5 MG tablet 2019 at am Mother Yes Yes   Si mg by Oral or FT or NG tube route every morning    hydrocortisone 1 % CREA cream  Mother Yes Yes   Sig: Place rectally 2 times daily as needed for other Apply to reddened memo areas as needed   levothyroxine (SYNTHROID/LEVOTHROID) 150 MCG tablet 2019 at am Mother Yes Yes   Sig: Take 150 mcg by mouth every morning   melatonin (MELATONIN) 1 MG/ML LIQD liquid Past Week at Unknown time Mother Yes Yes   Si mg by Per NG tube route At Bedtime    metoclopramide (REGLAN) 5 MG/5ML solution 2019 at am Mother Yes Yes   Si mg by Per Feeding Tube route 2 times daily   miconazole (MICATIN) 2 % AERP powder  Mother Yes Yes   Sig: Apply topically 2 times daily as needed    mupirocin (BACTROBAN) 2 % external ointment  Mother Yes Yes   Sig: Apply topically 2 times daily as needed    order for DME  Mother No No   Sig: Equipment being ordered: Nebulizer   pantoprazole (PROTONIX) 2 mg/mL SUSP suspension 2019 at am Mother No Yes   Si mLs (40 mg) by Per J Tube route daily   potassium & sodium phosphates (NEUTRA-PHOS) 280-160-250 MG Packet 2019 at am Mother Yes Yes   Sig: Take 1 packet by mouth 2 times daily    sodium bicarbonate 650 MG tablet 2019 at am Mother No Yes   Sig: Take 1 tablet (650 mg) by mouth daily   testosterone cypionate (DEPOTESTOTERONE CYPIONATE) 200 MG/ML injection 2019 at am Mother Yes Yes   Sig: Inject 76 mg into the muscle See Admin Instructions Every 2 weeks on    76 mg or 0.38 mL   vitamin D3 (CHOLECALCIFEROL) 2000 units (50 mcg) tablet 2019 at am Mother Yes Yes   Sig: Take 2,000 Units by mouth daily Crush and feed via j-tube @@ 0900      Facility-Administered Medications: None     Allergies   Allergies   Allergen Reactions     Dilantin [Phenytoin Sodium]      Valproic Acid      Toxicity c bone marrow suspension, elevated ammonia levels        Physical Exam   Vital Signs:  Temp: 98.9  F (37.2  C) Temp src: Temporal BP: 104/68 Pulse: 105 Heart Rate: 98 Resp: 18 SpO2: 93 % O2 Device: None (Room air)    Weight: 165 lbs 0 oz    GENERAL: Alert. NAD.  Muscle wasting, weakness of all extremities, follows commands.  HEENT: Normocephalic. EOMI. No icterus or injection. Nares normal.   LUNGS: Crackles to right base, with decrement bilaterally at bases. No dyspnea at rest.   HEART: Regular rate. Extremities perfused.   ABDOMEN: Soft, nontender, and nondistended. Positive bowel sounds. Condom catheter.   EXTREMITIES: No LE edema noted.    NEUROLOGIC: Follows commands limitedly. Tracks with eyes.     Data   Data reviewed today: I reviewed all medications, new labs and imaging results over the last 24 hours. I personally reviewed the chest x-ray image(s) showing see below.    IMPRESSION: Suboptimal inspiration. Bibasilar linear atelectasis or  fibrosis is suspected. The upper lungs appear clear. No pleural  effusion.    Most Recent 3 CBC's:  Recent Labs   Lab Test 12/23/19  1114 12/17/19  0927 12/16/19  1805   WBC 11.0 6.5 10.1   HGB 14.2 11.2* 12.7*   MCV 80 83 82    190 265     Most Recent 3 BMP's:  Recent Labs   Lab Test 12/23/19  1114 12/17/19  0927 12/16/19  1717    141 142   POTASSIUM 3.8 3.8 4.4   CHLORIDE 106 111* 106   CO2 27 26 28   BUN 23 17 20   CR 0.88 0.93 0.88   ANIONGAP 5 4 8   STEVE 8.8 7.9* 8.7   * 85 207*     Recent Results (from the past 24 hour(s))   XR Chest 2 Views    Narrative    CHEST TWO VIEWS  12/23/2019 11:35 AM     HISTORY: Check for pneumonia; fever and cough.    COMPARISON: 12/16/2019      Impression    IMPRESSION: Suboptimal inspiration. Bibasilar linear atelectasis or  fibrosis is suspected. The upper lungs appear clear. No pleural  effusion.    FITO GARLAND MD

## 2019-12-24 ENCOUNTER — ANESTHESIA (OUTPATIENT)
Dept: MEDSURG UNIT | Facility: CLINIC | Age: 59
DRG: 178 | End: 2019-12-24
Payer: MEDICARE

## 2019-12-24 ENCOUNTER — ANESTHESIA EVENT (OUTPATIENT)
Dept: MEDSURG UNIT | Facility: CLINIC | Age: 59
DRG: 178 | End: 2019-12-24
Payer: MEDICARE

## 2019-12-24 LAB
ANION GAP SERPL CALCULATED.3IONS-SCNC: 3 MMOL/L (ref 3–14)
BACTERIA SPEC CULT: NO GROWTH
BUN SERPL-MCNC: 18 MG/DL (ref 7–30)
CALCIUM SERPL-MCNC: 8.4 MG/DL (ref 8.5–10.1)
CHLORIDE SERPL-SCNC: 111 MMOL/L (ref 94–109)
CO2 SERPL-SCNC: 28 MMOL/L (ref 20–32)
CREAT SERPL-MCNC: 0.96 MG/DL (ref 0.66–1.25)
ERYTHROCYTE [DISTWIDTH] IN BLOOD BY AUTOMATED COUNT: 16.6 % (ref 10–15)
GFR SERPL CREATININE-BSD FRML MDRD: 87 ML/MIN/{1.73_M2}
GLUCOSE BLDC GLUCOMTR-MCNC: 100 MG/DL (ref 70–99)
GLUCOSE SERPL-MCNC: 105 MG/DL (ref 70–99)
HCT VFR BLD AUTO: 35.4 % (ref 40–53)
HGB BLD-MCNC: 11.1 G/DL (ref 13.3–17.7)
Lab: NORMAL
MCH RBC QN AUTO: 25.9 PG (ref 26.5–33)
MCHC RBC AUTO-ENTMCNC: 31.4 G/DL (ref 31.5–36.5)
MCV RBC AUTO: 83 FL (ref 78–100)
PLATELET # BLD AUTO: 155 10E9/L (ref 150–450)
POTASSIUM SERPL-SCNC: 3.8 MMOL/L (ref 3.4–5.3)
RBC # BLD AUTO: 4.29 10E12/L (ref 4.4–5.9)
SODIUM SERPL-SCNC: 142 MMOL/L (ref 133–144)
SPECIMEN SOURCE: NORMAL
WBC # BLD AUTO: 5.6 10E9/L (ref 4–11)

## 2019-12-24 PROCEDURE — 94640 AIRWAY INHALATION TREATMENT: CPT | Mod: 76

## 2019-12-24 PROCEDURE — 25800030 ZZH RX IP 258 OP 636: Performed by: STUDENT IN AN ORGANIZED HEALTH CARE EDUCATION/TRAINING PROGRAM

## 2019-12-24 PROCEDURE — 85027 COMPLETE CBC AUTOMATED: CPT | Performed by: STUDENT IN AN ORGANIZED HEALTH CARE EDUCATION/TRAINING PROGRAM

## 2019-12-24 PROCEDURE — 25000132 ZZH RX MED GY IP 250 OP 250 PS 637: Mod: GY | Performed by: STUDENT IN AN ORGANIZED HEALTH CARE EDUCATION/TRAINING PROGRAM

## 2019-12-24 PROCEDURE — 40000275 ZZH STATISTIC RCP TIME EA 10 MIN

## 2019-12-24 PROCEDURE — 99232 SBSQ HOSP IP/OBS MODERATE 35: CPT | Performed by: STUDENT IN AN ORGANIZED HEALTH CARE EDUCATION/TRAINING PROGRAM

## 2019-12-24 PROCEDURE — 00000146 ZZHCL STATISTIC GLUCOSE BY METER IP

## 2019-12-24 PROCEDURE — 25000125 ZZHC RX 250: Performed by: STUDENT IN AN ORGANIZED HEALTH CARE EDUCATION/TRAINING PROGRAM

## 2019-12-24 PROCEDURE — 25000125 ZZHC RX 250

## 2019-12-24 PROCEDURE — 12000000 ZZH R&B MED SURG/OB

## 2019-12-24 PROCEDURE — 25000128 H RX IP 250 OP 636: Performed by: STUDENT IN AN ORGANIZED HEALTH CARE EDUCATION/TRAINING PROGRAM

## 2019-12-24 PROCEDURE — 80048 BASIC METABOLIC PNL TOTAL CA: CPT | Performed by: STUDENT IN AN ORGANIZED HEALTH CARE EDUCATION/TRAINING PROGRAM

## 2019-12-24 PROCEDURE — 36415 COLL VENOUS BLD VENIPUNCTURE: CPT | Performed by: STUDENT IN AN ORGANIZED HEALTH CARE EDUCATION/TRAINING PROGRAM

## 2019-12-24 RX ORDER — GUAR GUM
1 PACKET (EA) ORAL DAILY
Status: DISCONTINUED | OUTPATIENT
Start: 2019-12-24 | End: 2019-12-25 | Stop reason: HOSPADM

## 2019-12-24 RX ORDER — ALBUTEROL SULFATE 0.83 MG/ML
SOLUTION RESPIRATORY (INHALATION)
Status: COMPLETED
Start: 2019-12-24 | End: 2019-12-24

## 2019-12-24 RX ADMIN — Medication 1 PACKET: at 17:01

## 2019-12-24 RX ADMIN — HYDROCORTISONE 10 MG: 10 TABLET ORAL at 17:01

## 2019-12-24 RX ADMIN — SODIUM CHLORIDE, POTASSIUM CHLORIDE, SODIUM LACTATE AND CALCIUM CHLORIDE: 600; 310; 30; 20 INJECTION, SOLUTION INTRAVENOUS at 21:38

## 2019-12-24 RX ADMIN — METOCLOPRAMIDE 5 MG: 5 SOLUTION ORAL at 08:24

## 2019-12-24 RX ADMIN — HYDROCORTISONE 20 MG: 20 TABLET ORAL at 08:24

## 2019-12-24 RX ADMIN — PIPERACILLIN AND TAZOBACTAM 3.38 G: 3; .375 INJECTION, POWDER, LYOPHILIZED, FOR SOLUTION INTRAVENOUS at 01:03

## 2019-12-24 RX ADMIN — CARBAMAZEPINE 150 MG: 100 SUSPENSION ORAL at 01:04

## 2019-12-24 RX ADMIN — PIPERACILLIN AND TAZOBACTAM 3.38 G: 3; .375 INJECTION, POWDER, LYOPHILIZED, FOR SOLUTION INTRAVENOUS at 18:49

## 2019-12-24 RX ADMIN — ACETYLCYSTEINE 2 ML: 200 SOLUTION ORAL; RESPIRATORY (INHALATION) at 21:06

## 2019-12-24 RX ADMIN — CARBAMAZEPINE 150 MG: 100 SUSPENSION ORAL at 07:30

## 2019-12-24 RX ADMIN — PIPERACILLIN AND TAZOBACTAM 3.38 G: 3; .375 INJECTION, POWDER, LYOPHILIZED, FOR SOLUTION INTRAVENOUS at 06:04

## 2019-12-24 RX ADMIN — SODIUM CHLORIDE, POTASSIUM CHLORIDE, SODIUM LACTATE AND CALCIUM CHLORIDE: 600; 310; 30; 20 INJECTION, SOLUTION INTRAVENOUS at 07:58

## 2019-12-24 RX ADMIN — PIPERACILLIN AND TAZOBACTAM 3.38 G: 3; .375 INJECTION, POWDER, LYOPHILIZED, FOR SOLUTION INTRAVENOUS at 12:29

## 2019-12-24 RX ADMIN — ASPIRIN 81 MG 81 MG: 81 TABLET ORAL at 08:24

## 2019-12-24 RX ADMIN — MELATONIN 6 MG: 3 TAB ORAL at 01:04

## 2019-12-24 RX ADMIN — ALBUTEROL SULFATE 2.5 MG: 2.5 SOLUTION RESPIRATORY (INHALATION) at 21:07

## 2019-12-24 RX ADMIN — CARBAMAZEPINE 150 MG: 100 SUSPENSION ORAL at 18:49

## 2019-12-24 RX ADMIN — CARBAMAZEPINE 150 MG: 100 SUSPENSION ORAL at 12:29

## 2019-12-24 RX ADMIN — METOCLOPRAMIDE 5 MG: 5 SOLUTION ORAL at 21:36

## 2019-12-24 RX ADMIN — LEVOTHYROXINE SODIUM 150 MCG: 150 TABLET ORAL at 08:24

## 2019-12-24 RX ADMIN — ALBUTEROL SULFATE 2.5 MG: 2.5 SOLUTION RESPIRATORY (INHALATION) at 15:00

## 2019-12-24 RX ADMIN — Medication 40 MG: at 08:24

## 2019-12-24 RX ADMIN — ACETYLCYSTEINE 2 ML: 200 SOLUTION ORAL; RESPIRATORY (INHALATION) at 15:00

## 2019-12-24 RX ADMIN — MELATONIN 6 MG: 3 TAB ORAL at 21:37

## 2019-12-24 ASSESSMENT — ACTIVITIES OF DAILY LIVING (ADL)
ADLS_ACUITY_SCORE: 45

## 2019-12-24 NOTE — PLAN OF CARE
DATE & TIME: 12-23-19    Cognitive Concerns/ Orientation :  Nonverbal, HECTOR orientation  BEHAVIOR & AGGRESSION TOOL COLOR:Green  CIWA SCORE: NA   ABNL VS/O2: VS stable, room air  MOBILITY:  Total care, turn and reposition every 2 hrs, not OOB this shift  PAIN MANAGMENT: turn and repo  DIET: NPO  BOWEL/BLADDER:  Incontinent of both bowel and badder  ABNL LAB/BG: see labs  DRAIN/DEVICES: periph IV  TELEMETRY RHYTHM: NA  SKIN:  Excoriated  rashy rectal, buttocks area with blancheable edema  TESTS/PROCEDURES: Scheduled Nebs per RT  D/C DAY/GOALS/PLACE: Back home with Mother/home care pending improvement   OTHER IMPORTANT INFO:Meds via GJ tube.

## 2019-12-24 NOTE — CONSULTS
Care Transition Initial Assessment - RN   DATA   Active Problems:    Recurrent Pneumonia HAP vs aspiration       Cognitive Status: awake, hx TBI     Contact information and PCP information verified: Yes      Insurance concerns: No Insurance issues identified  ASSESSMENT  Patient currently receives the following services: [facility] (P:[phone]) (Fax:[phone])for RN 12 Hr coverage daily and also receives a 12 hr PCA adarsh/night through [facility] (currently 80.5 hours every 7 days) and TF with supplies through [facility].       Identified issues/concerns regarding health management: Pt resides with his Mother Savannah.   She is also his Legal Guardian.  His PCA also lives with them.  This is pt's 19th admission this year.  He was just discharged five days ago.  Before that his last admission was 10/25-10/29, which was an improvement from past admission intervals. Pt is using Scopolamine tid, that has really helped decrease his secretions.  Pt is attending a day program through [facility] for 3 hrs T-F AM.  Pt has transportation provided for this.  On his last admission, Savannah reported she takes pt in her own car for his medical appointments.  She has had back surgery, so is not able to help pt's home care nurse put him in the vehicle.  Writer had discussed concern on caregiver's back, as pt is not able to help with transferring.     Planning hospital discharge in 1-2 days.  His mother typically likes his transportation home by 12:00 noon, via [employer].   Transportation has not been set up due to his discharge has not been confirmed for tomorrow.  Writer did call the [facility] to try to schedule hospital follow up.  Unfortunately, their scheduling was closed.

## 2019-12-24 NOTE — PHARMACY-CONSULT NOTE
Pharmacy consulted to review/monitor medications for enteral feeding interactions. Medications have been modified to be administered via tube. Carbamazepine suspension should be diluted 1:1 with water prior to administration; note entered in administration instructions. We will continue to monitor.    Christa Raymundo, PharmD, BCPS

## 2019-12-24 NOTE — PLAN OF CARE
.DATE & TIME: 12/24/19 ([id number])    Cognitive Concerns/ Orientation : Alert (nonverbal)   BEHAVIOR & AGGRESSION TOOL COLOR: Green  CIWA SCORE: N/a   ABNL VS/O2: VSS, RA  MOBILITY: Total cares, turning and repositioning   PAIN MANAGMENT: Denies  DIET: NPO, Tube feedings to start today Isosource at 100/hr, 60 water flushes every 4 hours  BOWEL/BLADDER: Incontinent  ABNL LAB/BG: Hemoglobin 11.1  DRAIN/DEVICES: IVF's infusing /hr (Iv Zosyn)  TELEMETRY RHYTHM: Na  SKIN: Jessica area reddened (barrier cream and powder applied)  TESTS/PROCEDURES: N/a  D/C DAY/GOALS/PLACE: Pending 1-2 days  OTHER IMPORTANT INFO: Lungs diminished, scheduled Nebs, antibiotic.  Mother at bedside this shift.

## 2019-12-24 NOTE — PLAN OF CARE
Cognitive Concerns/ Orientation : Non verbal, unable to assess orientation   BEHAVIOR & AGGRESSION TOOL COLOR: Green   CIWA SCORE: N/A               ABNL VS/O2: VSS 97% room air   MOBILITY: Lift, total cares   PAIN MANAGMENT: No s/s pain noted   DIET: NPO  BOWEL/BLADDER: Incontinent of bowel and bladder   ABNL LAB/BG: N/A   DRAIN/DEVICES: GT site clamped/use for medications.  Peripheral IV right arm infusing LR 100cc per hour   TELEMETRY RHYTHM: N/A   SKIN: Bruising, excoriated memo area, barrier cream applied   TESTS/PROCEDURES: N/A   D/C DAY/GOALS/PLACE: Pending   OTHER IMPORTANT INFO: Contact isolation for MRSA/VRE.  Lung sounds diminished.

## 2019-12-24 NOTE — CONSULTS
CLINICAL NUTRITION SERVICES  -  ASSESSMENT NOTE      Recommendations Ordered by Registered Dietitian (RD):     Nutrition Support Enteral:  Type of Feeding Tube: GJ tube (feed in J-port)  Enteral Frequency:  12 hr cycle  Enteral Regimen: Isosource 1.5 @ 100 mL/hr x 12 hrs (7am-7pm)  Total Enteral Provisions: 1800 cals (24 cals/kg), 82 gm pro (1.1 gm/kg, 91% est needs), 18 gm fiber, 912 mL free water  Nutrisource fiber 1 packet daily = 15 cals, 3 gm fiber  TOTAL: 1815 cals (24 cals/kg, 97% est needs), 21 gm fiber  Free Water Flush: 60 ml every 4 hrs while on IVF    OK to start TF at 100 mL/hr (as pt runs at home)   Malnutrition:   % Weight Loss:  None noted  % Intake:  No decreased intake noted  Subcutaneous Fat Loss:  None observed  Muscle Loss:  Decreased muscle tone with hemiplegia/TBI (no acute loss)  Fluid Retention:  None noted    Malnutrition Diagnosis: Patient does not meet two of the above criteria necessary for diagnosing malnutrition         REASON FOR ASSESSMENT  Keyon Farias is a 59 year old male seen by Registered Dietitian for Provider Order - Registered Dietitian to Assess and Order TF per Medical Nutrition Therapy Protocol      NUTRITION HISTORY  Pt is very well known to our service  Last Nutrition Assessment completed 1 week ago (12/17/19)    - Reliant on GJT to meet 100% nutritional needs  - PMH: aspiration and has GJ tube, 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   - Fluid flush: H2O  "flushes 120 mL QID.  -Gets 1 packet Nutrisource Fiber once daily    Mother not present, pt sleeping    CURRENT NUTRITION ORDERS  Diet Order:     NPO  Plan to resume TF      NUTRITION FOCUSED PHYSICAL ASSESSMENT FOR DIAGNOSING MALNUTRITION)  Yes  (brief visual exam - pt sleeping,did not wake)             Observed:    No nutrition-related physical findings observed    Obtained from Chart/Interdisciplinary Team:  12/23: Excoriated  rashy rectal, buttocks area with blancheable edema    ANTHROPOMETRICS  Height: 5'10\"  Weight:(12/24) 74.7 kg / 164 lbs 10.94 oz  Body mass index is 23.63 kg/m .  Weight Status:  Normal BMI  IBW: 75.4 kg  % IBW: 99%  Weight History: stable (typically fluctuates 155-165#)  Wt Readings from Last 10 Encounters:   12/24/19 74.7 kg (164 lb 10.9 oz)   12/18/19 76.7 kg (169 lb)   11/15/19 72.6 kg (160 lb)   11/11/19 72.9 kg (160 lb 12.8 oz)   10/28/19 73.6 kg (162 lb 4.1 oz)   10/13/19 68.9 kg (152 lb)   10/10/19 69 kg (152 lb 1.9 oz)   09/27/19 73.9 kg (163 lb)   09/24/19 74 kg (163 lb 2.3 oz)   09/18/19 69.9 kg (154 lb 1.6 oz)         LABS  Labs reviewed    MEDICATIONS  IVF @ 100 mL/hr      ASSESSED NUTRITION NEEDS PER APPROVED PRACTICE GUIDELINES:    Dosing Weight 74.7 kg  Estimated Energy Needs: 4142-1086 kcals (25-30 Kcal/Kg)  Justification: maintenance  Estimated Protein Needs:  grams protein (1.2-1.5 g pro/Kg)  Justification: maintenance  Estimated Fluid Needs: 1036-5861  mL (1 mL/Kcal)  Justification: maintenance    MALNUTRITION:  % Weight Loss:  None noted  % Intake:  No decreased intake noted  Subcutaneous Fat Loss:  None observed  Muscle Loss:  Decreased muscle tone with hemiplegia/TBI (no acute loss)  Fluid Retention:  None noted    Malnutrition Diagnosis: Patient does not meet two of the above criteria necessary for diagnosing malnutrition      NUTRITION DIAGNOSIS:  Inadequate protein-energy intake related to NPO status and TF hasn't been started as evidenced by pt meeting 0% est " needs      NUTRITION INTERVENTIONS  Recommendations / Nutrition Prescription  NPO    Nutrition Support Enteral:  Type of Feeding Tube: GJ tube (feed in J-port)  Enteral Frequency:  12 hr cycle  Enteral Regimen: Isosource 1.5 @ 100 mL/hr x 12 hrs (7am-7pm)  Total Enteral Provisions: 1800 cals (24 cals/kg), 82 gm pro (1.1 gm/kg, 91% est needs), 18 gm fiber, 912 mL free water  Nutrisource fiber 1 packet daily = 15 cals, 3 gm fiber  TOTAL: 1815 cals (24 cals/kg, 97% est needs), 21 gm fiber  Free Water Flush: 60 ml every 4 hrs while on IVF    OK to start TF at 100 mL/hr (as pt runs at home)  .      Implementation  Nutrition education: No education needs assessed at this time  EN Composition and EN Schedule: orders entered in EPIC  .      Nutrition Goals  EN of Isosource 1.5 @ 100 mL/hr x 12 hrs to meet % est needs  .      MONITORING AND EVALUATION:  Progress towards goals will be monitored and evaluated per protocol and Practice Guidelines

## 2019-12-24 NOTE — PROGRESS NOTES
[facility]    Medicine Progress Note - Hospitalist Service       Date of Admission:  12/23/2019  Date of Service: 12/24/2019    Assessment & Plan   Keyon Farias is a 59 year old male with PMH cardiac arrest, TBI, seizures, septic shock, nonverbal baseline status, aspiration pneumonia and pnuemonitis, who was admitted on 12/23/19 with weakness, fever, SOB, found to have persistent pneumonia.     Suspected healthcare associated pneumonia versus aspiration pneumonia, recurrent: Assessment: Patient with history of cardiac arrest, TBI, seizures, septic shock as well as previous several admissions or encephalopathy due to aspiration PNA.  History of present illness is limited due to patient with traumatic brain injury and review of systems is limited due to patient with cognitive impairment and non-verbal status. CXR shows Bibasilar linear atelectasis or fibrosis, overall presentation consistent with recurrent aspiration PNA. Patient has long history of aspiration pneumonia and pneumonitis.   Plan:  - Continue IV Zosyn today  - Follow BC .   - Pulmonary hygiene  - HOB elevation orders, aspiration precautions.   - Continue prior to admission Mucomyst/albuterol nebulizer treatments  - Continue prior to admission duo nebs  - Resume TF today.      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 Phylicia Stout [facility]     Perianal pressure injury with skin tear: Many previous admissions with wounds, high risk for wound development from pressure and immobility.   - Miconazole powder  - Frequent repositioning     Skin breakdown on penis: Noted on previous admission between  border of shaft of penis and glans penis on the right. Suspect secondary to condom catheter.  - Uses condom cath at home per report.         Diet: NPO for Medical/Clinical Reasons Except for: No Exceptions    DVT Prophylaxis: Pneumatic Compression Devices  Lerma Catheter: not present  Code Status: Full Code      Disposition Plan   Expected discharge: 1-2 days, recommended to prior living arrangement once antibiotic plan established.  Entered: Ricky Torres MD 12/24/2019, 10:27 AM       The patient's care was discussed with the Bedside Nurse and Patient.    Ricky Torres MD  Hospitalist Service  [facility]    ______________________________________________________________________    Interval History     Non verbal   Comfortable    Data reviewed today: I reviewed all medications, new labs and imaging results over the last 24 hours. I personally reviewed no images or EKG's today.    Physical Exam   Vital Signs: Temp: 97.8  F (36.6  C) Temp src: Axillary BP: 105/40 Pulse: 61 Heart Rate: 50 Resp: 18 SpO2: 97 % O2 Device: None (Room air)    Weight: 164 lbs 10.94 oz    GENERAL: Alert. NAD.  Muscle wasting, weakness of all extremities, follows commands.  HEENT: Normocephalic. EOMI. No icterus or injection. Nares normal.   LUNGS: Crackles to right base, with decrement bilaterally at bases. No dyspnea at rest.   HEART: Regular rate. Extremities perfused.   ABDOMEN: Soft, nontender, and nondistended. Positive bowel sounds. Condom catheter.   EXTREMITIES: No LE edema noted.    NEUROLOGIC: Follows commands limitedly. Tracks with eyes.  Non-verbal    Data   Recent Labs   Lab 12/24/19  0855 12/23/19  1114   WBC 5.6 11.0   HGB 11.1* 14.2   MCV 83 80    247    138   POTASSIUM 3.8 3.8   CHLORIDE 111* 106   CO2 28 27   BUN 18 23   CR 0.96 0.88   ANIONGAP 3 5   STEVE 8.4* 8.8   * 106*   ALBUMIN  --  3.4   PROTTOTAL  --  8.4   BILITOTAL  --  0.2   ALKPHOS  --  109   ALT  --  35   AST  --  26     Recent  Results (from the past 24 hour(s))   XR Chest 2 Views    Narrative    CHEST TWO VIEWS  12/23/2019 11:35 AM     HISTORY: Check for pneumonia; fever and cough.    COMPARISON: 12/16/2019      Impression    IMPRESSION: Suboptimal inspiration. Bibasilar linear atelectasis or  fibrosis is suspected. The upper lungs appear clear. No pleural  effusion.    FITO GARLAND MD     Medications     lactated ringers 100 mL/hr at 12/24/19 0758       acetylcysteine  2 mL Nebulization 4x Daily     aspirin  81 mg Oral or Feeding Tube Daily     Brivaracetam  100 mg Oral or Feeding Tube See Admin Instructions     carBAMazepine  150 mg Oral or Feeding Tube Q6H     hydrocortisone  10 mg Oral or Feeding Tube Daily with supper     hydrocortisone  20 mg Oral or Feeding Tube QAM     levothyroxine  150 mcg Oral or Feeding Tube QAM     melatonin  6 mg Per NG tube At Bedtime     metoclopramide  5 mg Per Feeding Tube BID     pantoprazole  40 mg Per J Tube Daily     piperacillin-tazobactam  3.375 g Intravenous Q6H     Scopolamine HBr  0.8 mg Feeding TID     sodium chloride (PF)  3 mL Intracatheter Q8H

## 2019-12-25 VITALS
OXYGEN SATURATION: 95 % | SYSTOLIC BLOOD PRESSURE: 117 MMHG | BODY MASS INDEX: 23.76 KG/M2 | RESPIRATION RATE: 18 BRPM | DIASTOLIC BLOOD PRESSURE: 64 MMHG | HEART RATE: 61 BPM | WEIGHT: 165.57 LBS | TEMPERATURE: 97.4 F

## 2019-12-25 PROCEDURE — 94640 AIRWAY INHALATION TREATMENT: CPT | Mod: 76

## 2019-12-25 PROCEDURE — 40000275 ZZH STATISTIC RCP TIME EA 10 MIN

## 2019-12-25 PROCEDURE — 25000128 H RX IP 250 OP 636: Performed by: STUDENT IN AN ORGANIZED HEALTH CARE EDUCATION/TRAINING PROGRAM

## 2019-12-25 PROCEDURE — 25000125 ZZHC RX 250: Performed by: STUDENT IN AN ORGANIZED HEALTH CARE EDUCATION/TRAINING PROGRAM

## 2019-12-25 PROCEDURE — 25800030 ZZH RX IP 258 OP 636: Performed by: STUDENT IN AN ORGANIZED HEALTH CARE EDUCATION/TRAINING PROGRAM

## 2019-12-25 PROCEDURE — 25000132 ZZH RX MED GY IP 250 OP 250 PS 637: Mod: GY | Performed by: STUDENT IN AN ORGANIZED HEALTH CARE EDUCATION/TRAINING PROGRAM

## 2019-12-25 PROCEDURE — 25000125 ZZHC RX 250

## 2019-12-25 PROCEDURE — 99239 HOSP IP/OBS DSCHRG MGMT >30: CPT | Performed by: STUDENT IN AN ORGANIZED HEALTH CARE EDUCATION/TRAINING PROGRAM

## 2019-12-25 RX ORDER — ALBUTEROL SULFATE 0.83 MG/ML
SOLUTION RESPIRATORY (INHALATION)
Status: COMPLETED
Start: 2019-12-25 | End: 2019-12-25

## 2019-12-25 RX ORDER — SODIUM BICARBONATE 650 MG/1
650 TABLET ORAL DAILY
Qty: 30 TABLET | Refills: 0 | Status: ON HOLD | OUTPATIENT
Start: 2019-12-25 | End: 2022-05-12

## 2019-12-25 RX ADMIN — CARBAMAZEPINE 150 MG: 100 SUSPENSION ORAL at 06:02

## 2019-12-25 RX ADMIN — ALBUTEROL SULFATE 2.5 MG: 2.5 SOLUTION RESPIRATORY (INHALATION) at 08:19

## 2019-12-25 RX ADMIN — SODIUM CHLORIDE, POTASSIUM CHLORIDE, SODIUM LACTATE AND CALCIUM CHLORIDE: 600; 310; 30; 20 INJECTION, SOLUTION INTRAVENOUS at 06:40

## 2019-12-25 RX ADMIN — PIPERACILLIN AND TAZOBACTAM 3.38 G: 3; .375 INJECTION, POWDER, LYOPHILIZED, FOR SOLUTION INTRAVENOUS at 00:16

## 2019-12-25 RX ADMIN — Medication 40 MG: at 08:07

## 2019-12-25 RX ADMIN — LEVOTHYROXINE SODIUM 150 MCG: 150 TABLET ORAL at 08:07

## 2019-12-25 RX ADMIN — CARBAMAZEPINE 150 MG: 100 SUSPENSION ORAL at 00:20

## 2019-12-25 RX ADMIN — ASPIRIN 81 MG 81 MG: 81 TABLET ORAL at 08:07

## 2019-12-25 RX ADMIN — CARBAMAZEPINE 150 MG: 100 SUSPENSION ORAL at 12:28

## 2019-12-25 RX ADMIN — ACETYLCYSTEINE 4 ML: 200 SOLUTION ORAL; RESPIRATORY (INHALATION) at 11:18

## 2019-12-25 RX ADMIN — PIPERACILLIN AND TAZOBACTAM 3.38 G: 3; .375 INJECTION, POWDER, LYOPHILIZED, FOR SOLUTION INTRAVENOUS at 12:27

## 2019-12-25 RX ADMIN — PIPERACILLIN AND TAZOBACTAM 3.38 G: 3; .375 INJECTION, POWDER, LYOPHILIZED, FOR SOLUTION INTRAVENOUS at 05:59

## 2019-12-25 RX ADMIN — ALBUTEROL SULFATE 2.5 MG: 2.5 SOLUTION RESPIRATORY (INHALATION) at 11:18

## 2019-12-25 RX ADMIN — ACETYLCYSTEINE 2 ML: 200 SOLUTION ORAL; RESPIRATORY (INHALATION) at 08:19

## 2019-12-25 RX ADMIN — METOCLOPRAMIDE 5 MG: 5 SOLUTION ORAL at 08:07

## 2019-12-25 RX ADMIN — HYDROCORTISONE 20 MG: 20 TABLET ORAL at 08:07

## 2019-12-25 ASSESSMENT — ACTIVITIES OF DAILY LIVING (ADL)
ADLS_ACUITY_SCORE: 45

## 2019-12-25 NOTE — PLAN OF CARE
Cognitive Concerns/ Orientation : Alert, non-verbal. Nods and gives thumbs up. Assists with turns.    BEHAVIOR & AGGRESSION TOOL COLOR: Green   CIWA SCORE: NA    ABNL VS/O2: VSS, On RA. Encouraged cough. Pt able to perform at times with weak to fair cough.   MOBILITY: Total care. Turned Q2h. Lift utilized.   PAIN MANAGMENT: Denies   DIET: NPO. TF started this evening for a few hours after supplies delivered to floor. Tolerated well.   BOWEL/BLADDER: Incont of urine. Voids freq. No BM this evening.   ABNL LAB/BG: No new labs.  this evening.   DRAIN/DEVICES: New PIV in L hand, infusing LR at 100mL/hr. J tube WDL. Used for all meds and feeding.   TELEMETRY RHYTHM: NA   SKIN: Jessica-area excoriated, pink and red. Barrier creams and powder used after cleaned and dried. Turned freq.   TESTS/PROCEDURES: NA   D/C DAY/GOALS/PLACE: Pending improvements.   OTHER IMPORTANT INFO: Contact precautions. Bed alarm on for safety. Rounded on freq.

## 2019-12-25 NOTE — PLAN OF CARE
Pt non-verbal, does shake head from side to side when asked some questions and follows some commands. VSS.  Room air, denies pain, no non-verbal indicators present for pain.  GJ tube feedings running from 7am-7pm, stopped at 1200 for planned discharge.  L hand PIV SL, removed for discharge.  B/B incontinent, large BM this shift and very large voids x2 this shift.  Assist of 2 and lift.  T/Rq2.  Blanchable redness and excoriation to buttocks and memo area.  Cleansing, powder and barrier applied.  AVS reviewed with Kylah over the phone, [place] (mother).  All belongings and rx (x2) packed and bagged.  Donned pants, socks and left hospital gown on, no shirt available.  Transported via cart with HE to home with family.

## 2019-12-25 NOTE — PLAN OF CARE
Cognitive Concerns/ Orientation : Alert, non verbal   BEHAVIOR & AGGRESSION TOOL COLOR: Green   CIWA SCORE: N/A   ABNL VS/O2: VSS 95% room air   MOBILITY: Total care/Lift   PAIN MANAGMENT: No s/s pain noted   DIET: NPO tube feeding from 7a-7p   BOWEL/BLADDER: Incontinent of bowel and bladder   ABNL LAB/BG: N/A   DRAIN/DEVICES: Peripheral IV left hand infusing LR at 100cc per hour   TELEMETRY RHYTHM: N/A   SKIN: Pale, excoriation to memo area, barrier cream applied   TESTS/PROCEDURES: N/A   D/C DAY/GOALS/PLACE: Pending   OTHER IMPORTANT INFO: Contact isolation for MRSA/VRE.

## 2019-12-25 NOTE — DISCHARGE SUMMARY
[facility]  Hospitalist Discharge Summary       Date of Admission:  12/23/2019  Date of Discharge:  12/25/2019  Discharging Provider: Ricky Torres MD      Discharge Diagnoses     Aspiration Pneumonia    Follow-ups Needed After Discharge   Follow-up Appointments     Follow-up and recommended labs and tests       Follow up with primary care provider, Carlos Gomez, within 7 days for   hospital follow- up.  The following labs/tests are recommended: none.           Unresulted Labs Ordered in the Past 30 Days of this Admission     Date and Time Order Name Status Description    12/23/2019 1107 Blood culture Preliminary     12/23/2019 1107 Blood culture Preliminary         Discharge Disposition   Discharged to home  Condition at discharge: Stable    Hospital Course   Keyon Farias is a 59 year old male with PMH cardiac arrest, TBI, seizures, septic shock, nonverbal baseline status, aspiration pneumonia and pnuemonitis, who was admitted on 12/23/19 with weakness, fever, SOB, found to have persistent pneumonia.     Suspected healthcare associated pneumonia versus aspiration pneumonia, recurrent: Assessment: Patient with history of cardiac arrest, TBI, seizures, septic shock as well as previous several admissions or encephalopathy due to aspiration PNA.  History of present illness is limited due to patient with traumatic brain injury and review of systems is limited due to patient with cognitive impairment and non-verbal status. CXR shows Bibasilar linear atelectasis or fibrosis, overall presentation consistent with recurrent aspiration PNA. Patient has long history of aspiration pneumonia and pneumonitis.   Plan:  - Augmentin for 10 days at discharge.   - Follow up with ID as outpatient for consideration of suppressive antibiotics.   - Continue prior to admission Mucomyst/albuterol nebulizer treatments  - Continue prior to admission duo nebs  - Resume TF today.      Panhypopituitarism: Patient maintained on  testosterone, hydrocortisone, levothyroxine.   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 Phylicia tSout of [place] [facility]     Perianal pressure injury with skin tear: Many previous admissions with wounds, high risk for wound development from pressure and immobility.   - Miconazole powder  - Frequent repositioning     Skin breakdown on penis: Noted on previous admission between border of shaft of penis and glans penis on the right. Suspect secondary to condom catheter.  - Uses condom cath at home per report.        Consultations This Hospital Stay   PHARMACY TO DOSE VANCO  NUTRITION SERVICES ADULT IP CONSULT  CARE TRANSITION RN/SW IP CONSULT  PHARMACY IP CONSULT    Code Status   Full Code    Time Spent on this Encounter   I, Ricky Torres MD, personally saw the patient today and spent greater than 30 minutes discharging this patient.       Ricky Torres MD  [facility]  ______________________________________________________________________    Physical Exam   Vital Signs: Temp: 97.4  F (36.3  C) Temp src: Axillary BP: 117/64   Heart Rate: 66 Resp: 18 SpO2: 95 % O2 Device: None (Room air)    Weight: 165 lbs 9.05 oz       Primary Care Physician   Carlos Gomez    Discharge Orders      Reason for your hospital stay    You had fever from pneumonia and needed antibiotics.     Follow-up and recommended labs and tests     Follow up with primary care provider, Carlos Gomez, within 7 days for hospital follow- up.  The following labs/tests are recommended: none.     Activity    Your activity upon discharge: activity as tolerated     Diet    Follow this diet upon discharge: Orders Placed This Encounter      Adult Formula Drip Feeding: Continuous Isosource 1.5;  Jejunostomy; Goal Rate: 100; mL/hr; From: 7:00 AM; 7:00 PM; Medication - Feeding Tube Flush Frequency: At least 15-30 mL water before and after medication administration and with tube clogging;...      NPO for Medical/Clinical Reasons Except for: No Exceptions       Significant Results and Procedures   Most Recent 3 CBC's:  Recent Labs   Lab Test 12/24/19  0855 12/23/19  1114 12/17/19  0927   WBC 5.6 11.0 6.5   HGB 11.1* 14.2 11.2*   MCV 83 80 83    247 190     Most Recent 3 BMP's:  Recent Labs   Lab Test 12/24/19  0855 12/23/19  1114 12/17/19  0927    138 141   POTASSIUM 3.8 3.8 3.8   CHLORIDE 111* 106 111*   CO2 28 27 26   BUN 18 23 17   CR 0.96 0.88 0.93   ANIONGAP 3 5 4   STEVE 8.4* 8.8 7.9*   * 106* 85   ,   Results for orders placed or performed during the hospital encounter of 12/23/19   XR Chest 2 Views    Narrative    CHEST TWO VIEWS  12/23/2019 11:35 AM     HISTORY: Check for pneumonia; fever and cough.    COMPARISON: 12/16/2019      Impression    IMPRESSION: Suboptimal inspiration. Bibasilar linear atelectasis or  fibrosis is suspected. The upper lungs appear clear. No pleural  effusion.    FITO GARLAND MD       Discharge Medications   Current Discharge Medication List      CONTINUE these medications which have CHANGED    Details   amoxicillin-clavulanate (AUGMENTIN) 875-125 MG tablet 1 tablet by Oral or Feeding Tube route 2 times daily  Qty: 20 tablet, Refills: 0    Associated Diagnoses: Aspiration, chronic pulmonary, initial encounter         CONTINUE these medications which have NOT CHANGED    Details   acetylcysteine (MUCOMYST) 20 % neb solution Take 2 mLs by nebulization 4 times daily With albuterol at 0700, 1100, 1500, and 1900       albuterol (PROVENTIL) (5 MG/ML) 0.5% neb solution Take 2.5 mg by nebulization every 4 hours (while awake) 0700 1100 1500 1900 with mucomyst       aspirin (ASA) 81 MG chewable tablet 81 mg by Oral or Feeding Tube route daily At 0900      bacitracin  ointment Apply topically daily as needed for wound care To PEG site.       Bioflavonoid Products (VITAMIN C PLUS) 1000 MG TABS 1 tablet by Oral or FT or NG tube route      Brivaracetam (BRIVIACT) 10 MG/ML solution 100 mg by Oral or Feeding Tube route 2 times daily 0900, 2100      calcium carbonate 1250 (500 CA) MG/5ML SUSP suspension 5 mLs (1,250 mg) by Per J Tube route 3 times daily (with meals)  Qty: 450 mL    Associated Diagnoses: Malnutrition (H)      carBAMazepine (TEGRETOL) 100 MG/5ML suspension 150 mg by Oral or Feeding Tube route every 6 hours At 06:00, 12:00, 18:00 and 24:00 for seizures       ferrous sulfate 220 (44 Fe) MG/5ML ELIX 220 mg by Per Feeding Tube route daily      !! hydrocortisone (CORTEF) 5 MG tablet 10 mg by Oral or FT or NG tube route daily (with dinner) At 1500      !! hydrocortisone (CORTEF) 5 MG tablet 20 mg by Oral or FT or NG tube route every morning       hydrocortisone 1 % CREA cream Place rectally 2 times daily as needed for other Apply to reddened memo areas as needed      levothyroxine (SYNTHROID/LEVOTHROID) 150 MCG tablet Take 150 mcg by mouth every morning      melatonin (MELATONIN) 1 MG/ML LIQD liquid 6 mg by Per NG tube route At Bedtime       metoclopramide (REGLAN) 5 MG/5ML solution 5 mg by Per Feeding Tube route 2 times daily      miconazole (MICATIN) 2 % AERP powder Apply topically 2 times daily as needed       mupirocin (BACTROBAN) 2 % external ointment Apply topically 2 times daily as needed       pantoprazole (PROTONIX) 2 mg/mL SUSP suspension 20 mLs (40 mg) by Per J Tube route daily  Qty: 400 mL, Refills: 1    Associated Diagnoses: Gastroesophageal reflux disease, esophagitis presence not specified      potassium & sodium phosphates (NEUTRA-PHOS) 280-160-250 MG Packet Take 1 packet by mouth 2 times daily       Scopolamine HBr POWD Dispense #90. Mix contents with small amount of water for admin via J-tube.  Administer 0.8 mg three times each day.  Qty: 90 Bottle, Refills:  11    Associated Diagnoses: Aspiration pneumonia (H)      Skin Protectants, Misc. (BALMEX SKIN PROTECTANT) OINT Externally apply topically 2 times daily as needed (irritation) Applay to reddened memo areas twice daily as needed      sodium bicarbonate 650 MG tablet Take 1 tablet (650 mg) by mouth daily  Qty: 30 tablet, Refills: 0    Associated Diagnoses: Hyponatremia      testosterone cypionate (DEPOTESTOTERONE CYPIONATE) 200 MG/ML injection Inject 76 mg into the muscle See Admin Instructions Every 2 weeks on Fridays   76 mg or 0.38 mL      vitamin D3 (CHOLECALCIFEROL) 2000 units (50 mcg) tablet Take 2,000 Units by mouth daily Crush and feed via j-tube @@ 0900      Guar Gum (FIBER MODULAR, NUTRISOURCE FIBER,) packet 1 packet by Per G Tube route daily  Qty: 30 packet, Refills: 0    Associated Diagnoses: Pneumonia of both lower lobes due to infectious organism (H)      order for DME Equipment being ordered: Nebulizer  Qty: 1 Units, Refills: 0    Associated Diagnoses: Pneumonia of both lower lobes due to infectious organism (H)       !! - Potential duplicate medications found. Please discuss with provider.        Allergies   Allergies   Allergen Reactions     Dilantin [Phenytoin Sodium]      Valproic Acid      Toxicity c bone marrow suspension, elevated ammonia levels

## 2019-12-25 NOTE — PROGRESS NOTES
SW:  D:  Received a phone call from patient's mother indicating Keyon will discharge today.  She is requesting transportation.  [employer] will transport patient today a 1330 via BLS stretcher due to patient being unable to sit up in a standard w/c, not verbal, not safe to transport unattended.   PCS has been completed. Mother is aware of the transport time.  Writer will contact his home care agencies.

## 2019-12-26 NOTE — PROGRESS NOTES
SW:  Post Hospital Note  D:  Writer spoke with Osiris at [facility] ([phone]) and faxed the H&P, discharge summary and orders to her at [phone].

## 2019-12-27 ENCOUNTER — HOSPITAL ENCOUNTER (INPATIENT)
Facility: CLINIC | Age: 59
LOS: 7 days | Discharge: HOME HEALTH | DRG: 871 | End: 2020-01-03
Attending: EMERGENCY MEDICINE | Admitting: HOSPITALIST
Payer: MEDICARE

## 2019-12-27 ENCOUNTER — APPOINTMENT (OUTPATIENT)
Dept: GENERAL RADIOLOGY | Facility: CLINIC | Age: 59
DRG: 871 | End: 2019-12-27
Attending: EMERGENCY MEDICINE
Payer: MEDICARE

## 2019-12-27 DIAGNOSIS — J69.0 ASPIRATION PNEUMONIA OF BOTH LOWER LOBES DUE TO GASTRIC SECRETIONS (H): ICD-10-CM

## 2019-12-27 DIAGNOSIS — R50.9 ACUTE FEBRILE ILLNESS: ICD-10-CM

## 2019-12-27 DIAGNOSIS — A41.9 SEVERE SEPSIS (H): ICD-10-CM

## 2019-12-27 DIAGNOSIS — R65.20 SEVERE SEPSIS (H): ICD-10-CM

## 2019-12-27 DIAGNOSIS — Z87.820 HISTORY OF TRAUMATIC BRAIN INJURY: Primary | ICD-10-CM

## 2019-12-27 LAB
ALBUMIN SERPL-MCNC: 3.1 G/DL (ref 3.4–5)
ALBUMIN UR-MCNC: 10 MG/DL
ALP SERPL-CCNC: 95 U/L (ref 40–150)
ALT SERPL W P-5'-P-CCNC: 36 U/L (ref 0–70)
ANION GAP SERPL CALCULATED.3IONS-SCNC: 5 MMOL/L (ref 3–14)
APPEARANCE UR: CLEAR
AST SERPL W P-5'-P-CCNC: 35 U/L (ref 0–45)
BASOPHILS # BLD AUTO: 0 10E9/L (ref 0–0.2)
BASOPHILS NFR BLD AUTO: 0.5 %
BILIRUB SERPL-MCNC: 0.2 MG/DL (ref 0.2–1.3)
BILIRUB UR QL STRIP: NEGATIVE
BUN SERPL-MCNC: 19 MG/DL (ref 7–30)
CALCIUM SERPL-MCNC: 8.4 MG/DL (ref 8.5–10.1)
CHLORIDE SERPL-SCNC: 102 MMOL/L (ref 94–109)
CO2 BLDCOV-SCNC: 27 MMOL/L (ref 21–28)
CO2 BLDCOV-SCNC: 30 MMOL/L (ref 21–28)
CO2 SERPL-SCNC: 29 MMOL/L (ref 20–32)
COLOR UR AUTO: YELLOW
CREAT SERPL-MCNC: 0.81 MG/DL (ref 0.66–1.25)
DIFFERENTIAL METHOD BLD: ABNORMAL
EOSINOPHIL # BLD AUTO: 0.2 10E9/L (ref 0–0.7)
EOSINOPHIL NFR BLD AUTO: 2.1 %
ERYTHROCYTE [DISTWIDTH] IN BLOOD BY AUTOMATED COUNT: 16.7 % (ref 10–15)
FLUAV+FLUBV AG SPEC QL: NEGATIVE
FLUAV+FLUBV AG SPEC QL: NEGATIVE
GFR SERPL CREATININE-BSD FRML MDRD: >90 ML/MIN/{1.73_M2}
GLUCOSE SERPL-MCNC: 129 MG/DL (ref 70–99)
GLUCOSE UR STRIP-MCNC: >1000 MG/DL
HCT VFR BLD AUTO: 39 % (ref 40–53)
HGB BLD-MCNC: 12.5 G/DL (ref 13.3–17.7)
HGB UR QL STRIP: NEGATIVE
IMM GRANULOCYTES # BLD: 0 10E9/L (ref 0–0.4)
IMM GRANULOCYTES NFR BLD: 0.3 %
KETONES UR STRIP-MCNC: NEGATIVE MG/DL
LACTATE BLD-SCNC: 2 MMOL/L (ref 0.7–2.1)
LACTATE BLD-SCNC: 2.7 MMOL/L (ref 0.7–2.1)
LEUKOCYTE ESTERASE UR QL STRIP: NEGATIVE
LYMPHOCYTES # BLD AUTO: 1.9 10E9/L (ref 0.8–5.3)
LYMPHOCYTES NFR BLD AUTO: 23.4 %
MCH RBC QN AUTO: 26.5 PG (ref 26.5–33)
MCHC RBC AUTO-ENTMCNC: 32.1 G/DL (ref 31.5–36.5)
MCV RBC AUTO: 83 FL (ref 78–100)
MONOCYTES # BLD AUTO: 0.4 10E9/L (ref 0–1.3)
MONOCYTES NFR BLD AUTO: 4.5 %
NEUTROPHILS # BLD AUTO: 5.5 10E9/L (ref 1.6–8.3)
NEUTROPHILS NFR BLD AUTO: 69.2 %
NITRATE UR QL: NEGATIVE
NRBC # BLD AUTO: 0 10*3/UL
NRBC BLD AUTO-RTO: 0 /100
PCO2 BLDV: 46 MM HG (ref 40–50)
PCO2 BLDV: 52 MM HG (ref 40–50)
PH BLDV: 7.37 PH (ref 7.32–7.43)
PH BLDV: 7.38 PH (ref 7.32–7.43)
PH UR STRIP: 7 PH (ref 5–7)
PLATELET # BLD AUTO: 154 10E9/L (ref 150–450)
PO2 BLDV: 18 MM HG (ref 25–47)
PO2 BLDV: 25 MM HG (ref 25–47)
POTASSIUM SERPL-SCNC: 3.9 MMOL/L (ref 3.4–5.3)
PROCALCITONIN SERPL-MCNC: <0.05 NG/ML
PROT SERPL-MCNC: 7.9 G/DL (ref 6.8–8.8)
RBC # BLD AUTO: 4.71 10E12/L (ref 4.4–5.9)
RBC #/AREA URNS AUTO: <1 /HPF (ref 0–2)
SAO2 % BLDV FROM PO2: 27 %
SAO2 % BLDV FROM PO2: 43 %
SODIUM SERPL-SCNC: 136 MMOL/L (ref 133–144)
SOURCE: ABNORMAL
SP GR UR STRIP: 1.02 (ref 1–1.03)
SPECIMEN SOURCE: NORMAL
UROBILINOGEN UR STRIP-MCNC: NORMAL MG/DL (ref 0–2)
WBC # BLD AUTO: 7.7 10E9/L (ref 4–11)
WBC #/AREA URNS AUTO: <1 /HPF (ref 0–5)

## 2019-12-27 PROCEDURE — 71045 X-RAY EXAM CHEST 1 VIEW: CPT

## 2019-12-27 PROCEDURE — 99222 1ST HOSP IP/OBS MODERATE 55: CPT | Mod: AI | Performed by: HOSPITALIST

## 2019-12-27 PROCEDURE — 25000128 H RX IP 250 OP 636: Performed by: EMERGENCY MEDICINE

## 2019-12-27 PROCEDURE — 87086 URINE CULTURE/COLONY COUNT: CPT | Performed by: EMERGENCY MEDICINE

## 2019-12-27 PROCEDURE — 82803 BLOOD GASES ANY COMBINATION: CPT

## 2019-12-27 PROCEDURE — 25000132 ZZH RX MED GY IP 250 OP 250 PS 637: Mod: GY | Performed by: EMERGENCY MEDICINE

## 2019-12-27 PROCEDURE — 96361 HYDRATE IV INFUSION ADD-ON: CPT

## 2019-12-27 PROCEDURE — 96375 TX/PRO/DX INJ NEW DRUG ADDON: CPT

## 2019-12-27 PROCEDURE — 80053 COMPREHEN METABOLIC PANEL: CPT | Performed by: EMERGENCY MEDICINE

## 2019-12-27 PROCEDURE — 87804 INFLUENZA ASSAY W/OPTIC: CPT | Performed by: EMERGENCY MEDICINE

## 2019-12-27 PROCEDURE — 25800030 ZZH RX IP 258 OP 636: Performed by: EMERGENCY MEDICINE

## 2019-12-27 PROCEDURE — 96365 THER/PROPH/DIAG IV INF INIT: CPT

## 2019-12-27 PROCEDURE — 99285 EMERGENCY DEPT VISIT HI MDM: CPT

## 2019-12-27 PROCEDURE — 84145 PROCALCITONIN (PCT): CPT | Performed by: EMERGENCY MEDICINE

## 2019-12-27 PROCEDURE — 85025 COMPLETE CBC W/AUTO DIFF WBC: CPT | Performed by: EMERGENCY MEDICINE

## 2019-12-27 PROCEDURE — 81001 URINALYSIS AUTO W/SCOPE: CPT | Performed by: EMERGENCY MEDICINE

## 2019-12-27 PROCEDURE — 83605 ASSAY OF LACTIC ACID: CPT

## 2019-12-27 PROCEDURE — 12000000 ZZH R&B MED SURG/OB

## 2019-12-27 PROCEDURE — 87040 BLOOD CULTURE FOR BACTERIA: CPT | Performed by: EMERGENCY MEDICINE

## 2019-12-27 RX ORDER — SODIUM CHLORIDE 9 MG/ML
1000 INJECTION, SOLUTION INTRAVENOUS CONTINUOUS
Status: DISCONTINUED | OUTPATIENT
Start: 2019-12-27 | End: 2019-12-28

## 2019-12-27 RX ORDER — KETOROLAC TROMETHAMINE 15 MG/ML
15 INJECTION, SOLUTION INTRAMUSCULAR; INTRAVENOUS ONCE
Status: COMPLETED | OUTPATIENT
Start: 2019-12-27 | End: 2019-12-27

## 2019-12-27 RX ORDER — PIPERACILLIN SODIUM, TAZOBACTAM SODIUM 3; .375 G/15ML; G/15ML
3.38 INJECTION, POWDER, LYOPHILIZED, FOR SOLUTION INTRAVENOUS ONCE
Status: COMPLETED | OUTPATIENT
Start: 2019-12-27 | End: 2019-12-27

## 2019-12-27 RX ADMIN — SODIUM CHLORIDE 1000 ML: 9 INJECTION, SOLUTION INTRAVENOUS at 23:26

## 2019-12-27 RX ADMIN — KETOROLAC TROMETHAMINE 15 MG: 15 INJECTION, SOLUTION INTRAMUSCULAR; INTRAVENOUS at 22:06

## 2019-12-27 RX ADMIN — SODIUM CHLORIDE 1000 ML: 9 INJECTION, SOLUTION INTRAVENOUS at 21:58

## 2019-12-27 RX ADMIN — PIPERACILLIN AND TAZOBACTAM 3.38 G: 3; .375 INJECTION, POWDER, LYOPHILIZED, FOR SOLUTION INTRAVENOUS at 21:59

## 2019-12-27 RX ADMIN — ACETAMINOPHEN 1000 MG: 160 SUSPENSION ORAL at 22:11

## 2019-12-27 RX ADMIN — SODIUM CHLORIDE 1000 ML: 9 INJECTION, SOLUTION INTRAVENOUS at 19:29

## 2019-12-27 RX ADMIN — BRIVARACETAM 100 MG: 10 SOLUTION ORAL at 21:02

## 2019-12-27 ASSESSMENT — MIFFLIN-ST. JEOR: SCORE: 1611.44

## 2019-12-28 DIAGNOSIS — E87.1 HYPONATREMIA: ICD-10-CM

## 2019-12-28 PROBLEM — R50.9 FEVER OF UNKNOWN ORIGIN: Status: ACTIVE | Noted: 2019-12-28

## 2019-12-28 LAB
ANION GAP SERPL CALCULATED.3IONS-SCNC: 4 MMOL/L (ref 3–14)
BACTERIA SPEC CULT: NO GROWTH
BUN SERPL-MCNC: 13 MG/DL (ref 7–30)
CALCIUM SERPL-MCNC: 7.6 MG/DL (ref 8.5–10.1)
CHLORIDE SERPL-SCNC: 113 MMOL/L (ref 94–109)
CO2 SERPL-SCNC: 25 MMOL/L (ref 20–32)
CREAT SERPL-MCNC: 0.83 MG/DL (ref 0.66–1.25)
ERYTHROCYTE [DISTWIDTH] IN BLOOD BY AUTOMATED COUNT: 17.1 % (ref 10–15)
GFR SERPL CREATININE-BSD FRML MDRD: >90 ML/MIN/{1.73_M2}
GLUCOSE SERPL-MCNC: 99 MG/DL (ref 70–99)
HCT VFR BLD AUTO: 31.9 % (ref 40–53)
HGB BLD-MCNC: 10.2 G/DL (ref 13.3–17.7)
LACTATE BLD-SCNC: 3 MMOL/L (ref 0.7–2)
LACTATE BLD-SCNC: 3.4 MMOL/L (ref 0.7–2)
Lab: NORMAL
MCH RBC QN AUTO: 26.4 PG (ref 26.5–33)
MCHC RBC AUTO-ENTMCNC: 32 G/DL (ref 31.5–36.5)
MCV RBC AUTO: 83 FL (ref 78–100)
PLATELET # BLD AUTO: 119 10E9/L (ref 150–450)
POTASSIUM SERPL-SCNC: 4 MMOL/L (ref 3.4–5.3)
PROCALCITONIN SERPL-MCNC: 0.62 NG/ML
RBC # BLD AUTO: 3.86 10E12/L (ref 4.4–5.9)
SODIUM SERPL-SCNC: 142 MMOL/L (ref 133–144)
SPECIMEN SOURCE: NORMAL
WBC # BLD AUTO: 14.7 10E9/L (ref 4–11)

## 2019-12-28 PROCEDURE — 94640 AIRWAY INHALATION TREATMENT: CPT | Mod: 76

## 2019-12-28 PROCEDURE — 84145 PROCALCITONIN (PCT): CPT | Performed by: NURSE PRACTITIONER

## 2019-12-28 PROCEDURE — 25000128 H RX IP 250 OP 636: Performed by: HOSPITALIST

## 2019-12-28 PROCEDURE — 36415 COLL VENOUS BLD VENIPUNCTURE: CPT | Performed by: HOSPITALIST

## 2019-12-28 PROCEDURE — 40000275 ZZH STATISTIC RCP TIME EA 10 MIN

## 2019-12-28 PROCEDURE — 25800030 ZZH RX IP 258 OP 636: Performed by: NURSE PRACTITIONER

## 2019-12-28 PROCEDURE — 94640 AIRWAY INHALATION TREATMENT: CPT

## 2019-12-28 PROCEDURE — 12000000 ZZH R&B MED SURG/OB

## 2019-12-28 PROCEDURE — 83605 ASSAY OF LACTIC ACID: CPT | Performed by: STUDENT IN AN ORGANIZED HEALTH CARE EDUCATION/TRAINING PROGRAM

## 2019-12-28 PROCEDURE — 83605 ASSAY OF LACTIC ACID: CPT | Performed by: NURSE PRACTITIONER

## 2019-12-28 PROCEDURE — 99232 SBSQ HOSP IP/OBS MODERATE 35: CPT | Performed by: STUDENT IN AN ORGANIZED HEALTH CARE EDUCATION/TRAINING PROGRAM

## 2019-12-28 PROCEDURE — 99356 ZZC PROLONGED SERV,INPATIENT,1ST HR: CPT | Performed by: STUDENT IN AN ORGANIZED HEALTH CARE EDUCATION/TRAINING PROGRAM

## 2019-12-28 PROCEDURE — 80048 BASIC METABOLIC PNL TOTAL CA: CPT | Performed by: HOSPITALIST

## 2019-12-28 PROCEDURE — 25000132 ZZH RX MED GY IP 250 OP 250 PS 637: Mod: GY | Performed by: HOSPITALIST

## 2019-12-28 PROCEDURE — 36415 COLL VENOUS BLD VENIPUNCTURE: CPT | Performed by: NURSE PRACTITIONER

## 2019-12-28 PROCEDURE — 36415 COLL VENOUS BLD VENIPUNCTURE: CPT | Performed by: STUDENT IN AN ORGANIZED HEALTH CARE EDUCATION/TRAINING PROGRAM

## 2019-12-28 PROCEDURE — 85027 COMPLETE CBC AUTOMATED: CPT | Performed by: HOSPITALIST

## 2019-12-28 PROCEDURE — 25800030 ZZH RX IP 258 OP 636: Performed by: HOSPITALIST

## 2019-12-28 PROCEDURE — 25000125 ZZHC RX 250: Performed by: HOSPITALIST

## 2019-12-28 PROCEDURE — 99207 ZZC NO BILLABLE SERVICE THIS VISIT: CPT | Performed by: NURSE PRACTITIONER

## 2019-12-28 RX ORDER — AMOXICILLIN 250 MG
1 CAPSULE ORAL 2 TIMES DAILY PRN
Status: DISCONTINUED | OUTPATIENT
Start: 2019-12-28 | End: 2020-01-03 | Stop reason: HOSPADM

## 2019-12-28 RX ORDER — NALOXONE HYDROCHLORIDE 0.4 MG/ML
.1-.4 INJECTION, SOLUTION INTRAMUSCULAR; INTRAVENOUS; SUBCUTANEOUS
Status: DISCONTINUED | OUTPATIENT
Start: 2019-12-28 | End: 2020-01-03 | Stop reason: HOSPADM

## 2019-12-28 RX ORDER — POTASSIUM CHLORIDE 7.45 MG/ML
10 INJECTION INTRAVENOUS
Status: DISCONTINUED | OUTPATIENT
Start: 2019-12-28 | End: 2020-01-03 | Stop reason: HOSPADM

## 2019-12-28 RX ORDER — CARBAMAZEPINE 100 MG/5ML
150 SUSPENSION ORAL EVERY 6 HOURS
Status: DISCONTINUED | OUTPATIENT
Start: 2019-12-28 | End: 2020-01-03 | Stop reason: HOSPADM

## 2019-12-28 RX ORDER — AMOXICILLIN 250 MG
2 CAPSULE ORAL 2 TIMES DAILY PRN
Status: DISCONTINUED | OUTPATIENT
Start: 2019-12-28 | End: 2020-01-03 | Stop reason: HOSPADM

## 2019-12-28 RX ORDER — LIDOCAINE 40 MG/G
CREAM TOPICAL
Status: DISCONTINUED | OUTPATIENT
Start: 2019-12-28 | End: 2020-01-03 | Stop reason: HOSPADM

## 2019-12-28 RX ORDER — GUAR GUM
1 PACKET (EA) ORAL DAILY
Status: DISCONTINUED | OUTPATIENT
Start: 2019-12-28 | End: 2020-01-03 | Stop reason: HOSPADM

## 2019-12-28 RX ORDER — SODIUM CHLORIDE 9 MG/ML
INJECTION, SOLUTION INTRAVENOUS CONTINUOUS
Status: DISCONTINUED | OUTPATIENT
Start: 2019-12-28 | End: 2019-12-28

## 2019-12-28 RX ORDER — BACITRACIN ZINC 500 [USP'U]/G
OINTMENT TOPICAL DAILY PRN
Status: DISCONTINUED | OUTPATIENT
Start: 2019-12-28 | End: 2020-01-03 | Stop reason: HOSPADM

## 2019-12-28 RX ORDER — POTASSIUM CHLORIDE 1.5 G/1.58G
20-40 POWDER, FOR SOLUTION ORAL
Status: DISCONTINUED | OUTPATIENT
Start: 2019-12-28 | End: 2020-01-03 | Stop reason: HOSPADM

## 2019-12-28 RX ORDER — POTASSIUM CHLORIDE 29.8 MG/ML
20 INJECTION INTRAVENOUS
Status: DISCONTINUED | OUTPATIENT
Start: 2019-12-28 | End: 2020-01-03 | Stop reason: HOSPADM

## 2019-12-28 RX ORDER — BENZOCAINE/MENTHOL 6 MG-10 MG
LOZENGE MUCOUS MEMBRANE 2 TIMES DAILY PRN
Status: DISCONTINUED | OUTPATIENT
Start: 2019-12-28 | End: 2020-01-03 | Stop reason: HOSPADM

## 2019-12-28 RX ORDER — POTASSIUM CL/LIDO/0.9 % NACL 10MEQ/0.1L
10 INTRAVENOUS SOLUTION, PIGGYBACK (ML) INTRAVENOUS
Status: DISCONTINUED | OUTPATIENT
Start: 2019-12-28 | End: 2020-01-03 | Stop reason: HOSPADM

## 2019-12-28 RX ORDER — SCOPOLAMINE HYDROBROMIDE
0.8 POWDER (GRAM) MISCELLANEOUS 3 TIMES DAILY
Status: DISCONTINUED | OUTPATIENT
Start: 2019-12-28 | End: 2020-01-03 | Stop reason: HOSPADM

## 2019-12-28 RX ORDER — POTASSIUM CHLORIDE 1500 MG/1
20-40 TABLET, EXTENDED RELEASE ORAL
Status: DISCONTINUED | OUTPATIENT
Start: 2019-12-28 | End: 2020-01-03 | Stop reason: HOSPADM

## 2019-12-28 RX ORDER — ACETAMINOPHEN 325 MG/1
650 TABLET ORAL EVERY 4 HOURS PRN
Status: DISCONTINUED | OUTPATIENT
Start: 2019-12-28 | End: 2020-01-03 | Stop reason: HOSPADM

## 2019-12-28 RX ORDER — PIPERACILLIN SODIUM, TAZOBACTAM SODIUM 3; .375 G/15ML; G/15ML
3.38 INJECTION, POWDER, LYOPHILIZED, FOR SOLUTION INTRAVENOUS EVERY 6 HOURS
Status: DISCONTINUED | OUTPATIENT
Start: 2019-12-28 | End: 2020-01-01

## 2019-12-28 RX ORDER — ALBUTEROL SULFATE 0.83 MG/ML
2.5 SOLUTION RESPIRATORY (INHALATION) EVERY 4 HOURS
Status: DISCONTINUED | OUTPATIENT
Start: 2019-12-28 | End: 2019-12-29

## 2019-12-28 RX ORDER — LANOLIN ALCOHOL/MO/W.PET/CERES
6 CREAM (GRAM) TOPICAL AT BEDTIME
Status: DISCONTINUED | OUTPATIENT
Start: 2019-12-28 | End: 2020-01-03 | Stop reason: HOSPADM

## 2019-12-28 RX ORDER — ACETAMINOPHEN 650 MG/1
650 SUPPOSITORY RECTAL EVERY 4 HOURS PRN
Status: DISCONTINUED | OUTPATIENT
Start: 2019-12-28 | End: 2020-01-03 | Stop reason: HOSPADM

## 2019-12-28 RX ORDER — ONDANSETRON 2 MG/ML
4 INJECTION INTRAMUSCULAR; INTRAVENOUS EVERY 6 HOURS PRN
Status: DISCONTINUED | OUTPATIENT
Start: 2019-12-28 | End: 2020-01-03 | Stop reason: HOSPADM

## 2019-12-28 RX ORDER — HYDROCORTISONE 10 MG/1
10 TABLET ORAL
Status: DISCONTINUED | OUTPATIENT
Start: 2019-12-28 | End: 2020-01-03 | Stop reason: HOSPADM

## 2019-12-28 RX ORDER — LEVOTHYROXINE SODIUM 75 UG/1
150 TABLET ORAL
Status: DISCONTINUED | OUTPATIENT
Start: 2019-12-28 | End: 2020-01-03 | Stop reason: HOSPADM

## 2019-12-28 RX ORDER — HYDROCORTISONE 20 MG/1
20 TABLET ORAL EVERY MORNING
Status: DISCONTINUED | OUTPATIENT
Start: 2019-12-28 | End: 2020-01-03 | Stop reason: HOSPADM

## 2019-12-28 RX ORDER — ONDANSETRON 4 MG/1
4 TABLET, ORALLY DISINTEGRATING ORAL EVERY 6 HOURS PRN
Status: DISCONTINUED | OUTPATIENT
Start: 2019-12-28 | End: 2020-01-03 | Stop reason: HOSPADM

## 2019-12-28 RX ORDER — METOCLOPRAMIDE HYDROCHLORIDE 5 MG/5ML
5 SOLUTION ORAL 2 TIMES DAILY
Status: DISCONTINUED | OUTPATIENT
Start: 2019-12-28 | End: 2020-01-03 | Stop reason: HOSPADM

## 2019-12-28 RX ORDER — ACETYLCYSTEINE 200 MG/ML
2 SOLUTION ORAL; RESPIRATORY (INHALATION) EVERY 4 HOURS
Status: DISCONTINUED | OUTPATIENT
Start: 2019-12-28 | End: 2019-12-29

## 2019-12-28 RX ORDER — IBUPROFEN 600 MG/1
600 TABLET, FILM COATED ORAL EVERY 6 HOURS PRN
Status: DISCONTINUED | OUTPATIENT
Start: 2019-12-28 | End: 2020-01-03 | Stop reason: HOSPADM

## 2019-12-28 RX ORDER — SODIUM BICARBONATE 650 MG/1
650 TABLET ORAL DAILY
Status: DISCONTINUED | OUTPATIENT
Start: 2019-12-28 | End: 2020-01-03 | Stop reason: HOSPADM

## 2019-12-28 RX ORDER — ASPIRIN 81 MG/1
81 TABLET, CHEWABLE ORAL DAILY
Status: DISCONTINUED | OUTPATIENT
Start: 2019-12-28 | End: 2020-01-03 | Stop reason: HOSPADM

## 2019-12-28 RX ADMIN — PIPERACILLIN AND TAZOBACTAM 3.38 G: 3; .375 INJECTION, POWDER, FOR SOLUTION INTRAVENOUS at 07:14

## 2019-12-28 RX ADMIN — ACETYLCYSTEINE 2 ML: 200 SOLUTION ORAL; RESPIRATORY (INHALATION) at 02:42

## 2019-12-28 RX ADMIN — BRIVARACETAM 100 MG: 10 SOLUTION ORAL at 22:39

## 2019-12-28 RX ADMIN — SODIUM CHLORIDE: 9 INJECTION, SOLUTION INTRAVENOUS at 07:15

## 2019-12-28 RX ADMIN — SODIUM CHLORIDE 500 ML: 9 INJECTION, SOLUTION INTRAVENOUS at 16:43

## 2019-12-28 RX ADMIN — Medication 40 MG: at 09:21

## 2019-12-28 RX ADMIN — MELATONIN 6 MG: 3 TAB ORAL at 22:33

## 2019-12-28 RX ADMIN — POTASSIUM & SODIUM PHOSPHATES POWDER PACK 280-160-250 MG 1 PACKET: 280-160-250 PACK at 16:01

## 2019-12-28 RX ADMIN — METOCLOPRAMIDE 5 MG: 5 SOLUTION ORAL at 22:38

## 2019-12-28 RX ADMIN — ACETAMINOPHEN 650 MG: 325 TABLET, FILM COATED ORAL at 02:03

## 2019-12-28 RX ADMIN — SODIUM CHLORIDE, POTASSIUM CHLORIDE, SODIUM LACTATE AND CALCIUM CHLORIDE 1000 ML: 600; 310; 30; 20 INJECTION, SOLUTION INTRAVENOUS at 16:46

## 2019-12-28 RX ADMIN — Medication 0.8 MG: at 16:01

## 2019-12-28 RX ADMIN — PIPERACILLIN AND TAZOBACTAM 3.38 G: 3; .375 INJECTION, POWDER, FOR SOLUTION INTRAVENOUS at 18:13

## 2019-12-28 RX ADMIN — ASPIRIN 81 MG 81 MG: 81 TABLET ORAL at 09:22

## 2019-12-28 RX ADMIN — SODIUM CHLORIDE 500 ML: 9 INJECTION, SOLUTION INTRAVENOUS at 05:30

## 2019-12-28 RX ADMIN — CARBAMAZEPINE 150 MG: 100 SUSPENSION ORAL at 11:45

## 2019-12-28 RX ADMIN — SODIUM CHLORIDE: 9 INJECTION, SOLUTION INTRAVENOUS at 01:09

## 2019-12-28 RX ADMIN — PIPERACILLIN AND TAZOBACTAM 3.38 G: 3; .375 INJECTION, POWDER, FOR SOLUTION INTRAVENOUS at 11:44

## 2019-12-28 RX ADMIN — Medication 1 PACKET: at 14:14

## 2019-12-28 RX ADMIN — ALBUTEROL SULFATE 2.5 MG: 2.5 SOLUTION RESPIRATORY (INHALATION) at 20:40

## 2019-12-28 RX ADMIN — CARBAMAZEPINE 150 MG: 100 SUSPENSION ORAL at 02:03

## 2019-12-28 RX ADMIN — BRIVARACETAM 100 MG: 10 SOLUTION ORAL at 09:22

## 2019-12-28 RX ADMIN — SODIUM BICARBONATE 650 MG TABLET 650 MG: at 09:24

## 2019-12-28 RX ADMIN — CARBAMAZEPINE 150 MG: 100 SUSPENSION ORAL at 18:19

## 2019-12-28 RX ADMIN — CARBAMAZEPINE 150 MG: 100 SUSPENSION ORAL at 07:18

## 2019-12-28 RX ADMIN — ALBUTEROL SULFATE 2.5 MG: 2.5 SOLUTION RESPIRATORY (INHALATION) at 08:46

## 2019-12-28 RX ADMIN — ACETYLCYSTEINE 2 ML: 200 SOLUTION ORAL; RESPIRATORY (INHALATION) at 13:57

## 2019-12-28 RX ADMIN — ALBUTEROL SULFATE 2.5 MG: 2.5 SOLUTION RESPIRATORY (INHALATION) at 02:42

## 2019-12-28 RX ADMIN — PIPERACILLIN AND TAZOBACTAM 3.38 G: 3; .375 INJECTION, POWDER, FOR SOLUTION INTRAVENOUS at 22:47

## 2019-12-28 RX ADMIN — METOCLOPRAMIDE 5 MG: 5 SOLUTION ORAL at 02:03

## 2019-12-28 RX ADMIN — MINERAL SUPPLEMENT IRON 300 MG / 5 ML STRENGTH LIQUID 100 PER BOX UNFLAVORED 220 MG: at 11:44

## 2019-12-28 RX ADMIN — Medication 0.8 MG: at 22:37

## 2019-12-28 RX ADMIN — HYDROCORTISONE 20 MG: 20 TABLET ORAL at 09:22

## 2019-12-28 RX ADMIN — HYDROCORTISONE 10 MG: 10 TABLET ORAL at 17:22

## 2019-12-28 RX ADMIN — LEVOTHYROXINE SODIUM 150 MCG: 75 TABLET ORAL at 07:18

## 2019-12-28 RX ADMIN — METOCLOPRAMIDE 5 MG: 5 SOLUTION ORAL at 09:21

## 2019-12-28 RX ADMIN — ALBUTEROL SULFATE 2.5 MG: 2.5 SOLUTION RESPIRATORY (INHALATION) at 13:57

## 2019-12-28 RX ADMIN — POTASSIUM & SODIUM PHOSPHATES POWDER PACK 280-160-250 MG 1 PACKET: 280-160-250 PACK at 09:22

## 2019-12-28 ASSESSMENT — ACTIVITIES OF DAILY LIVING (ADL)
TRANSFERRING: 3-->ASSISTIVE EQUIPMENT AND PERSON
BATHING: 3-->ASSISTIVE EQUIPMENT AND PERSON
ADLS_ACUITY_SCORE: 38
ADLS_ACUITY_SCORE: 38
TOILETING: 3-->ASSISTIVE EQUIPMENT AND PERSON
FALL_HISTORY_WITHIN_LAST_SIX_MONTHS: NO
ADLS_ACUITY_SCORE: 38
SWALLOWING: 2-->DIFFICULTY SWALLOWING LIQUIDS/FOODS
WHICH_OF_THE_ABOVE_FUNCTIONAL_RISKS_HAD_A_RECENT_ONSET_OR_CHANGE?: AMBULATION;TRANSFERRING;TOILETING;BATHING;DRESSING;EATING;SWALLOWING;COMMUNICATION/SPEECH
COGNITION: 0 - NO COGNITION ISSUES REPORTED
RETIRED_COMMUNICATION: 2-->DIFFICULTY SPEAKING (NOT RELATED TO LANGUAGE BARRIER)
DRESS: 3-->ASSISTIVE EQUIPMENT AND PERSON
AMBULATION: 3-->ASSISTIVE EQUIPMENT AND PERSON
ADLS_ACUITY_SCORE: 38
RETIRED_EATING: 4-->COMPLETELY DEPENDENT
ADLS_ACUITY_SCORE: 38

## 2019-12-28 NOTE — PROGRESS NOTES
Sepsis Evaluation Progress Note    I was called to see Keyon Farias due to abnormal vital signs triggering the Sepsis SIRS screening alert. 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  Laboratory data coupled with vital signs prompted lactic acid draw which resulted at 3.  For this reason house NP was called.    Physical Exam   Vital Signs:  Temp: 98.3  F (36.8  C) Temp src: Oral BP: 100/53 Pulse: 115 Heart Rate: 104 Resp: 18 SpO2: 90 % O2 Device: None (Room air) Oxygen Delivery: 2 LPM     During my exam heart rate is 104, SPO2 86% on room air, after significant amount of coughing and repositioning SPO2 is now 95% on room air, respiratory is 28 while sleeping/at rest, BPs initially were 89/59, recheck is 83/56, again once he is more awake and after some coughing SBP is now 106/49.    Lab:  Lactic Acid   Date Value Ref Range Status   12/27/2019 2.0 0.7 - 2.1 mmol/L Final     Lactate for Sepsis Protocol   Date Value Ref Range Status   12/28/2019 3.0 (H) 0.7 - 2.0 mmol/L Final     Comment:     Significant value called to and read back by  ZAIRE SORENSEN IN 55 AT 1553 MA         The patient has signs of altered level of consciousness suspicious for Septic encephalopathy.  He wakes to verbal stimuli and is able to participate in care but seems sleepier than usual..     The rest of their physical exam is significant for   Neuro: Moves all extremities, nonverbal at baseline  HEENT: Moist oral mucosa  Neck: supple  Heart: S1S2, normal sinus rhythm sinus tachycardia no murmurs, hypotense  Lungs: CTAB upper lobes, tachypneic   Abd:normoactive bowel sounds, soft, nontender, nondisteded  Ext: no edema, warm, no mottling      Assessment & Plan   Keyon Farias meets SIRS criteria, with heart rate, respiratory rate,  leukocytosis totaling 3, Q sofa is at least 2 for mental status and blood pressure.  There is evidence of acute organ damage with tachypnea, hypoxia, depressed mentation (usually he is a bit more awake and perkier but he does wake up) and also hypotension   these vital sign, lab and physical exam findings are consistent with SEPSIS.  he is already on appropriate treatment with Zosyn and was volume expanded with 2.5 L of crystalloid (>30mL/kg PBW) in the emergency department which was completed on the a.m. of 12/20/2019.    Aside from PEG tube no other chronic indwelling catheters.  He has had diarrhea that have admission, enteric panel is pending.  Is not currently febrile, no chills, not able to ascertain regarding abdominal pain or dysuria but UA was negative on 12/27/2019.  Mentation as described above.  Will confirm with patient's mother.    Because of hypotension 80s over 50s we will give additional IV fluid bolus to an point of resuscitation of normotension.      -rectal temp 99  -Recheck lactic acid at 1830  -Recheck procalcitonin at 1830 also  -Continuous telemetry  -Continuous pulse oximetry monitoring  -Keep head of bed elevated.  -I attempted to place O2 on the patient but he refused and was shaking his head and would not allow us to place it.      Anti-infectives (From now, onward)    Start     Dose/Rate Route Frequency Ordered Stop    12/28/19 0530  piperacillin-tazobactam (ZOSYN) 3.375 g vial to attach to  mL bag     Note to Pharmacy:  Pharmacy can adjust dose based on renal function.    3.375 g  over 30 Minutes Intravenous EVERY 6 HOURS 12/28/19 0525          Current antibiotic coverage is appropriate for source of infection.     Disposition: The patient will remain on the current unit. We will continue to monitor this patient closely.    Patient's mother is at bedside so we will provide an update to her.    I have also updated patient's rounding hospitalist Dr. Ricky Torres.     Total time 30  minutes    Joy Miramontes CNP  Hospitalist - [facility]  [phone]     Text Page

## 2019-12-28 NOTE — PLAN OF CARE
Patient arrived to unit at 0100. Non verbal, history TBI. Low BP's at beginning of shift, 500 mL bolus given. BP's improved this AM. Afebrile. Oxygen stable on RA. PRN Tylenol given. Incontinent of urine. Cares provided. PEG dressing clean, dry, and intact. Awaiting ID consult and nutritional orders. Patient slept between cares. Will continue to monitor.

## 2019-12-28 NOTE — CONSULTS
CLINICAL NUTRITION SERVICES  -  ASSESSMENT NOTE      Recommendations Ordered by Registered Dietitian (RD):   Nutrition Support Enteral:  Type of Feeding Tube: GJ tube (feed in J-port)  Enteral Frequency:  12 hr cycle  Enteral Regimen: Isosource 1.5 @ 100 mL/hr x 12 hrs (7am-7pm)  Total Enteral Provisions: 1800 cals (24 cals/kg), 82 gm pro (1.1 gm/kg, 91% est needs), 18 gm fiber, 912 mL free water  Nutrisource fiber 1 packet daily = 15 cals, 3 gm fiber  TOTAL: 1815 cals (24 cals/kg, 97% est needs), 21 gm fiber  Free Water Flush:  120 mL every 4 hrs     OK to start TF now at 100 mL/hr (as pt runs at home)   Malnutrition:   % Weight Loss:  None noted  % Intake:  No decreased intake noted  Subcutaneous Fat Loss:  None observed  Muscle Loss:  Decreased muscle mass (chronic) due to immobility, hemiplegia/TBI (no acute loss)  Fluid Retention:  None noted    Malnutrition Diagnosis: Patient does not meet two of the above criteria necessary for diagnosing malnutrition        REASON FOR ASSESSMENT  Keyon Farias is a 59 year old male seen by Registered Dietitian for Admission Nutrition Risk Screen for tube feeding or parenteral nutrition and Provider Order - Registered Dietitian to Assess and Order TF per Medical Nutrition Therapy Protocol      NUTRITION HISTORY  Pt is very well known to our service  Last Nutrition Assessment completed 1 week ago (12/17/19)     - Reliant on GJT to meet 100% nutritional needs  - PMH: aspiration and has GJ tube, 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   - Fluid flush: H2O flushes 120 mL QID.  -Gets 1 packet Nutrisource Fiber once daily    Mother not present this early pm.  Note pt has been having issues with loose stools.      CURRENT NUTRITION ORDERS  Diet Order:     NPO   Was asked to resume TF via J-port of GJ FT.    Current Intake/Tolerance:  TF currently on hold.      NUTRITION FOCUSED PHYSICAL ASSESSMENT FOR DIAGNOSING MALNUTRITION)  Yes         Observed:    Muscle wasting (refer to documentation in Malnutrition section)    Obtained from Chart/Interdisciplinary Team:  Perianal pressure injury with skin tear - Many previous admissions with wounds, high risk for wound development from pressure and immobility.    ANTHROPOMETRICS  Height: 6' 0\"  Weight:  74.8 kg (165 lbs 0 oz)  Body mass index is 22.38 kg/m .  Weight Status:  Normal BMI  IBW:  80.9 kg  % IBW:  92%  Weight History:  Weight fluctuates btw 155-165 lbs.    Wt Readings from Last 20 Encounters:   12/27/19 74.8 kg (165 lb)   12/25/19 75.1 kg (165 lb 9.1 oz)   12/18/19 76.7 kg (169 lb)   11/15/19 72.6 kg (160 lb)   11/11/19 72.9 kg (160 lb 12.8 oz)   10/28/19 73.6 kg (162 lb 4.1 oz)   10/13/19 68.9 kg (152 lb)   10/10/19 69 kg (152 lb 1.9 oz)   09/27/19 73.9 kg (163 lb)   09/24/19 74 kg (163 lb 2.3 oz)   09/18/19 69.9 kg (154 lb 1.6 oz)   09/13/19 72 kg (158 lb 11.2 oz)   08/15/19 75.4 kg (166 lb 3.6 oz)   08/07/19 72.6 kg (160 lb)   06/19/19 72.4 kg (159 lb 11.2 oz)   05/30/19 74.5 kg (164 lb 3.9 oz)   05/08/19 74.5 kg (164 lb 3.2 oz)   04/08/19 71.8 kg (158 lb 6.4 oz)   04/02/19 72 kg (158 lb 11.7 oz)   03/18/19 70.9 kg (156 lb 4.9 oz)       LABS  Labs reviewed    MEDICATIONS  Medications reviewed  IVF was 100 mL/hr but off today  Reglan BID - for motility  NeutraPhos BID - for supplementation      ASSESSED NUTRITION NEEDS PER APPROVED PRACTICE GUIDELINES:    Dosing Weight:  74.8 kg (admit wt)  Estimated Energy Needs:  " 2027-5104 kcals (25-30 Kcal/Kg)  Justification: maintenance  Estimated Protein Needs:   grams protein (1.2-1.5 g pro/Kg)  Justification: preservation of lean body mass  Estimated Fluid Needs:  0172-4120 mL (1 mL/Kcal)  Justification: maintenance    MALNUTRITION:  % Weight Loss:  None noted  % Intake:  No decreased intake noted  Subcutaneous Fat Loss:  None observed  Muscle Loss:  Decreased muscle mass (chronic) due to immobility, hemiplegia/TBI (no acute loss)  Fluid Retention:  None noted    Malnutrition Diagnosis: Patient does not meet two of the above criteria necessary for diagnosing malnutrition      NUTRITION DIAGNOSIS:  Inadequate enteral nutrition infusion related to TF on hold as evidenced by TF meeting 0% needs with plan to resume today       NUTRITION INTERVENTIONS  Recommendations / Nutrition Prescription    Nutrition Support Enteral:  Type of Feeding Tube: GJ tube (feed in J-port)  Enteral Frequency:  12 hr cycle  Enteral Regimen: Isosource 1.5 @ 100 mL/hr x 12 hrs (7am-7pm)  Total Enteral Provisions: 1800 cals (24 cals/kg), 82 gm pro (1.1 gm/kg, 91% est needs), 18 gm fiber, 912 mL free water  Nutrisource fiber 1 packet daily = 15 cals, 3 gm fiber  TOTAL: 1815 cals (24 cals/kg, 97% est needs), 21 gm fiber  Free Water Flush:  120 mL every 4 hrs     OK to start TF now at 100 mL/hr (as pt runs at home)      Implementation  Nutrition education: Not appropriate at this time due to patient condition  EN Composition, EN Schedule, Feeding Tube Flush and Medical Food Supplement - Orders entered in Epic as above    Nutrition Goals  TF Isosource 1.5 at 100 mL/hr x 12 hrs + Nutrisource Fiber 1 packet per day will meet % estimated needs      MONITORING AND EVALUATION:  Progress towards goals will be monitored and evaluated per protocol and Practice Guidelines    Lisa Garcia, RD, LD, CNSC

## 2019-12-28 NOTE — H&P
[facility]    History and Physical  Hospitalist       Date of Admission:  12/27/2019    Assessment & Plan   Keyon Farias is a 59 year old male who presents with fever and slightly different cough    Recurrent fever  Sepsis  21st admission this year. Last admit was 12/23-25 for fever with suspected pneumonia (HCAP vs aspiration) due to bibasilar atelectasis seen on CXR. He was given zosyn in hospital, then transitioned to Augmentin for 10 days on discharge and continued on this until his presentation today, 12/27. CXR does not look significantly changed from prior. Tmax 101 at home. SBP 90s on admit with HR in 120s. Had episode of vomiting at home. WBC 7.7, Lactic acid 2.7 (improved to 2 after IVF), procalcitonin <0.05. Flu negative. UA without infection, no new symptoms. At this point, no new source for infection. Has been on antibiotics since 12/16 and continues to have fevers. Met sepsis criteria on admission, but his low BP and high HR could be secondary to GI losses with ongoing diarrhea  - Admit inpatient  - Monitor fever curve  - Follow up blood cultures  - Did receive zosyn in ED, will hold further abx at this time and prefer to involve ID for guidelines going forward as he has been on numerous antibiotics and there was some mention of suppressive antibiotics in the future--mother would like to have more information about this  - Continue prior to admission Mucomyst/albuterol nebulizer treatments, but change to q4h around the clock instead of while awake if he indeed had aspiration event.  - Resume TF  - IVF  - Tylenol/ibuprofen available for fever    ADDENDUM 0530: Noted to be hypotensive throughout night, but fevers improved. Give 500ml IVF bolus now, resume zosyn--defer discontinuation to ID    Diarrhea  - Ongoing loose stools likely secondary to antibiotics. No worrisome signs for c diff, will check enteric panel for completeness, but suspect will be negative.  - IVF due to GI losses with  hypotension/tachycardia  - Hold abx and see if diarrhea improves     Panhypopituitarism  Patient maintained on testosterone, hydrocortisone, levothyroxine.   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 Phylicia Stout of [facility]  - If continues to have staring episodes outside of fevers, would consider EEG.     Perianal pressure injury with skin tear  Many previous admissions with wounds, high risk for wound development from pressure and immobility.   - Frequent repositioning     Skin breakdown on penis, improved  Noted on previous admission between border of shaft of penis and glans penis on the right. Suspect secondary to condom catheter.    DVT Prophylaxis: Pneumatic Compression Devices  Code Status: Full Code  Expected discharge: 2 - 3 days, recommended to prior living arrangement once fevers resolved    Keeley Claros DO    Primary Care Physician   Carlos Gomez    Chief Complaint   Fever and low blood pressure    History is obtained from the patient's mother and prior record review    History of Present Illness   Keyon Farias is a 59 year old male who presents with fevers, lower blood pressure and episode of emesis at home. Mother reports patient had normal day today and yesterday, but that Wednesday (day of discharge) was a little harder as she did not have nursing help. She reports giving him extra water via his G tube due to his ongoing diarrhea, but may not have done as much as he needed. Today (12/27) she reported episode of emesis followed by temperature that continued to rise until it was 101. At that point, EMS was called. She reports dry cough intermittently, loose stools ongoing due to antibiotics and episodes of staring. She is worried that his staring is indicating seizure vs just being related to his fevers (he does tend to move his head around and stare while having fevers). She is highly  "interested in speaking with infectious disease during this hospitalization as it was recommended on his discharge paperwork and she \"didn't know where to start\" to follow up.    Past Medical History    I have reviewed this patient's medical history and updated it with pertinent information if needed.   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  Past Surgical History:   Procedure Laterality Date     ENDOSCOPIC ULTRASOUND UPPER GASTROINTESTINAL TRACT (GI) N/A 1/30/2017    Procedure: ENDOSCOPIC ULTRASOUND, ESOPHAGOSCOPY / UPPER GASTROINTESTINAL TRACT (GI);  Surgeon: Jus Montana MD;  Location: [facility] OR     ENDOSCOPIC ULTRASOUND, ESOPHAGOSCOPY, GASTROSCOPY, DUODENOSCOPY (EGD), NECROSECTOMY N/A 2/7/2017    Procedure: ENDOSCOPIC ULTRASOUND, ESOPHAGOSCOPY, GASTROSCOPY, DUODENOSCOPY (EGD), NECROSECTOMY;  Surgeon: Jack Marcus MD;  Location: [facility] OR     ESOPHAGOSCOPY, GASTROSCOPY, DUODENOSCOPY (EGD), COMBINED  3/13/2014    Procedure: COMBINED ESOPHAGOSCOPY, GASTROSCOPY, DUODENOSCOPY (EGD), BIOPSY SINGLE OR " MULTIPLE;  gastroscopy;  Surgeon: Digna Rhodes MD;  Location:  GI     ESOPHAGOSCOPY, GASTROSCOPY, DUODENOSCOPY (EGD), COMBINED N/A 2016    Procedure: COMBINED ESOPHAGOSCOPY, GASTROSCOPY, DUODENOSCOPY (EGD);  Surgeon: Digna Rhodes MD;  Location:  GI     ESOPHAGOSCOPY, GASTROSCOPY, DUODENOSCOPY (EGD), COMBINED N/A 2017    Procedure: COMBINED ENDOSCOPIC ULTRASOUND, ESOPHAGOSCOPY, GASTROSCOPY, DUODENOSCOPY (EGD), FINE NEEDLE ASPIRATE/BIOPSY;  Surgeon: Too Thakur MD;  Location: [facility] OR     HEAD & NECK SURGERY      reconstructive facial surgery following accident in      IR FOLLOW UP VISIT INPATIENT  2019     IR GASTRO JEJUNOSTOMY TUBE CHANGE  2018     IR GASTRO JEJUNOSTOMY TUBE CHANGE  2019     IR GASTRO JEJUNOSTOMY TUBE CHANGE  3/8/2019     IR GASTRO JEJUNOSTOMY TUBE CHANGE  2019     IR PICC EXCHANGE LEFT  8/15/2019     LAPAROSCOPIC APPENDECTOMY  2013    Procedure: LAPAROSCOPIC APPENDECTOMY;  LAPAROSCOPIC APPENDECTOMY;  Surgeon: Manish Pierce MD;  Location:  OR     LAPAROSCOPIC ASSISTED INSERTION TUBE GASTROTOMY N/A 2016    Procedure: LAPAROSCOPIC ASSISTED INSERTION TUBE GASTROSTOMY;  Surgeon: Manish Pierce MD;  Location:  OR     ORTHOPEDIC SURGERY      right hand repair     TRACHEOSTOMY N/A 9/3/2016    Procedure: TRACHEOSTOMY;  Surgeon: João Ortiz MD;  Location:  OR     TRACHEOSTOMY N/A 2016    Procedure: TRACHEOSTOMY;  Surgeon: João Ortiz MD;  Location:  OR     VASCULAR SURGERY         Prior to Admission Medications   Prior to Admission Medications   Prescriptions Last Dose Informant Patient Reported? Taking?   Bioflavonoid Products (VITAMIN C PLUS) 1000 MG TABS 2019 at Unknown time Mother Yes Yes   Si tablet by Oral or FT or NG tube route   Brivaracetam (BRIVIACT) 10 MG/ML solution 2019 at Unknown time Mother Yes Yes   Si mg by Oral or Feeding Tube route 2 times  daily 0900, 2100   Guar Gum (FIBER MODULAR, NUTRISOURCE FIBER,) packet 2019 at Unknown time Mother No Yes   Si packet by Per G Tube route daily   Scopolamine HBr POWD 2019 at Unknown time Mother No Yes   Sig: Dispense #90. Mix contents with small amount of water for admin via J-tube.  Administer 0.8 mg three times each day.   Skin Protectants, Misc. (BALMEX SKIN PROTECTANT) OINT 2019 at Unknown time Mother Yes Yes   Sig: Externally apply topically 2 times daily as needed (irritation) Applay to reddened memo areas twice daily as needed   acetylcysteine (MUCOMYST) 20 % neb solution 2019 at Unknown time Mother Yes Yes   Sig: Take 2 mLs by nebulization 4 times daily With albuterol at 0700, 1100, 1500, and 1900    albuterol (PROVENTIL) (5 MG/ML) 0.5% neb solution 2019 at Unknown time Mother Yes Yes   Sig: Take 2.5 mg by nebulization every 4 hours (while awake) 0700 1100 1500 1900 with mucomyst    amoxicillin-clavulanate (AUGMENTIN) 875-125 MG tablet 2019 at Unknown time  No Yes   Si tablet by Oral or Feeding Tube route 2 times daily   aspirin (ASA) 81 MG chewable tablet 2019 at Unknown time Mother Yes Yes   Si mg by Oral or Feeding Tube route daily At 0900   bacitracin ointment 2019 at Unknown time Mother Yes Yes   Sig: Apply topically daily as needed for wound care To PEG site.    calcium carbonate 1250 (500 CA) MG/5ML SUSP suspension 2019 at Unknown time Mother No Yes   Si mLs (1,250 mg) by Per J Tube route 3 times daily (with meals)   carBAMazepine (TEGRETOL) 100 MG/5ML suspension 2019 at Unknown time Mother Yes Yes   Si mg by Oral or Feeding Tube route every 6 hours At 06:00, 12:00, 18:00 and 24:00 for seizures    ferrous sulfate 220 (44 Fe) MG/5ML ELIX 2019 at Unknown time Mother Yes Yes   Si mg by Per Feeding Tube route daily   hydrocortisone (CORTEF) 5 MG tablet 2019 at Unknown time Mother Yes Yes   Sig: 10 mg by  Oral or FT or NG tube route daily (with dinner) At 1500   hydrocortisone (CORTEF) 5 MG tablet 2019 at Unknown time Mother Yes Yes   Si mg by Oral or FT or NG tube route every morning    hydrocortisone 1 % CREA cream 2019 at Unknown time Mother Yes Yes   Sig: Place rectally 2 times daily as needed for other Apply to reddened memo areas as needed   levothyroxine (SYNTHROID/LEVOTHROID) 150 MCG tablet 2019 at Unknown time Mother Yes Yes   Sig: Take 150 mcg by mouth every morning   melatonin (MELATONIN) 1 MG/ML LIQD liquid 2019 at Unknown time Mother Yes Yes   Si mg by Per NG tube route At Bedtime    metoclopramide (REGLAN) 5 MG/5ML solution 2019 at Unknown time Mother Yes Yes   Si mg by Per Feeding Tube route 2 times daily   miconazole (MICATIN) 2 % AERP powder 2019 at Unknown time Mother Yes Yes   Sig: Apply topically 2 times daily as needed    mupirocin (BACTROBAN) 2 % external ointment 2019 at Unknown time Mother Yes Yes   Sig: Apply topically 2 times daily as needed    order for DME 2019 at Unknown time Mother No Yes   Sig: Equipment being ordered: Nebulizer   pantoprazole (PROTONIX) 2 mg/mL SUSP suspension 2019 at Unknown time Mother No Yes   Si mLs (40 mg) by Per J Tube route daily   potassium & sodium phosphates (NEUTRA-PHOS) 280-160-250 MG Packet 2019 at Unknown time Mother Yes Yes   Sig: Take 1 packet by mouth 2 times daily    sodium bicarbonate 650 MG tablet 2019 at Unknown time  No Yes   Sig: Take 1 tablet (650 mg) by mouth daily   testosterone cypionate (DEPOTESTOTERONE CYPIONATE) 200 MG/ML injection 2019 at Unknown time Mother Yes Yes   Sig: Inject 76 mg into the muscle See Admin Instructions Every 2 weeks on    76 mg or 0.38 mL   vitamin D3 (CHOLECALCIFEROL) 2000 units (50 mcg) tablet 2019 at Unknown time Mother Yes Yes   Sig: Take 2,000 Units by mouth daily Crush and feed via j-tube @@ 0900       Facility-Administered Medications: None     Allergies   Allergies   Allergen Reactions     Dilantin [Phenytoin Sodium]      Valproic Acid      Toxicity c bone marrow suspension, elevated ammonia levels        Social History   I have reviewed this patient's social history and updated it with pertinent information if needed. Keyon Farias  reports that he quit smoking about 30 years ago. He has never used smokeless tobacco. He reports that he does not drink alcohol or use drugs.    Family History   I have reviewed this patient's family history and updated it with pertinent information if needed.   Family History   Problem Relation Age of Onset     Cancer Father        Review of Systems   The 10 point Review of Systems is negative other than noted in the HPI    Physical Exam   Temp: 99.5  F (37.5  C) Temp src: Oral BP: 104/55 Pulse: 115 Heart Rate: 118 Resp: 21 SpO2: 93 % O2 Device: None (Room air)    Vital Signs with Ranges  Temp:  [98.2  F (36.8  C)-102.7  F (39.3  C)] 99.5  F (37.5  C)  Pulse:  [115-137] 115  Heart Rate:  [108-133] 118  Resp:  [10-85] 21  BP: ()/(55-76) 104/55  SpO2:  [91 %-97 %] 93 %  165 lbs 0 oz    Constitutional: Awake, alert, cooperative, mild distress with fevers (moving head, staring), otherwise smiling  Eyes: Conjunctiva and pupils examined and normal.  HEENT: Moist mucous membranes, normal dentition.  Respiratory: Clear to auscultation bilaterally, no crackles or wheezing. Dry cough (heard once during my 20 minutes in room)  Cardiovascular: Tachycardic but regular rhythm, normal S1 and S2, and no murmur noted.  GI: Soft, non-distended, non-tender, normal bowel sounds.  Lymph/Hematologic: No anterior cervical or supraclavicular adenopathy.  Skin: No rashes, no cyanosis, no edema.  Musculoskeletal: No joint swelling, erythema or tenderness. Chronic contractures.  Neurologic: Cranial nerves 2-12 intact, normal strength and sensation.  Psychiatric: Alert, says hello, at his baseline  mental status.    Data   Data reviewed today:  I personally reviewed CXR which shows bibasilar opacities, likely atelectasis, no new infiltrates  Recent Labs   Lab 12/27/19  1930 12/24/19  0855 12/23/19  1114   WBC 7.7 5.6 11.0   HGB 12.5* 11.1* 14.2   MCV 83 83 80    155 247    142 138   POTASSIUM 3.9 3.8 3.8   CHLORIDE 102 111* 106   CO2 29 28 27   BUN 19 18 23   CR 0.81 0.96 0.88   ANIONGAP 5 3 5   STEVE 8.4* 8.4* 8.8   * 105* 106*   ALBUMIN 3.1*  --  3.4   PROTTOTAL 7.9  --  8.4   BILITOTAL 0.2  --  0.2   ALKPHOS 95  --  109   ALT 36  --  35   AST 35  --  26       Recent Results (from the past 24 hour(s))   XR Chest Port 1 View    Narrative    XR PORTABLE CHEST ONE VIEW   12/27/2019 8:27 PM     HISTORY: Fever, cough.    COMPARISON: Chest x-ray on 12/23/2019      Impression    IMPRESSION: Single portable AP view of the chest was obtained. Stable  cardiomediastinal silhouette. Mild bibasilar pulmonary opacities, left  greater than right, likely atelectasis. No significant pleural  effusion or pneumothorax.    DIANE DON MD

## 2019-12-28 NOTE — ED PROVIDER NOTES
History     Chief Complaint:  Fever and Hypotension       The history is provided by the EMS personnel. History limited by: non-verbal.      Keyon Farias is a 59 year old male with a history of traumatic brain injury who presents with fever and hypotension. The patient was found to have a fever of 101 at home today. The patient's family gave him tylenol which improved the fever to 99. The patient had one episode of vomiting as well as a cough today according to family. While en route to the emergency room, the patient was noted to have blood pressures in the 90s systolic and heart rate in the 120s.     Per chart review, the patient was recently admitted to the hospital on 12/23 due to acute sepsis and discharged on 12/25 with a course of Augmentin.     Allergies:  Dilantin  Valproic Acid     Medications:    Mucomyst  Augmentin  Albuterol  Aspirin 81 mg  Briviact  Tegretol  Iron  Cortef  Duo neb  Synthroid  Melatonin  Reglan  Protonix  Depotestosterone  Sodium bicarbonate  Bactroban       Past Medical History:    Aphasia  TBI  DVT  GERD  Panhypopituitarism  Pneumonia  Seizures  Septic shock  Spastic hemiplegia  Thyroid disease  Tracheostomy care  Stroke  UTI  Ventricular fibrillation  Ventricular tachyarrhythmia  Seizures   Pancreatitis  Asthma  Cardiac arrest  SIRS  Encephalopathy  Aspiration pneumonitis      Past Surgical History:    GI endoscopy  EGD x4  Head and neck surgery  Jejunostomy tube change x4  PICC exchange  Appendectomy  Gastrotomy  Ortho surgery  Tracheostomy x2  Vascular surgery     Family History:    Cancer     Social History:  Patient presents via EMS.   Smoking Status: Former Smoker  Smokeless Tobacco: Never Used  Alcohol Use: Negative   Drug Use: Negative  PCP: Carlos Gomez   Marital Status:  Single        Review of Systems   Unable to perform ROS: Patient nonverbal       Physical Exam     Patient Vitals for the past 24 hrs:   BP Temp Temp src Heart Rate Resp SpO2 Height Weight   12/27/19  2211 -- 102.7  F (39.3  C) Axillary -- -- -- -- --   12/27/19 2100 114/61 -- -- 120 -- 97 % -- --   12/27/19 2020 -- -- -- -- -- 92 % -- --   12/27/19 2015 -- -- -- -- -- 97 % -- --   12/27/19 2010 101/60 -- -- -- 20 -- -- --   12/27/19 1907 107/63 98.2  F (36.8  C) Oral 108 16 95 % 1.829 m (6') 74.8 kg (165 lb)        Physical Exam    General: middle aged gentleman smiling and at baseline from my prior assessments showing no acute distress.     Eye:  Pupils are equal, round, and reactive.  Extraocular movements intact.    ENT:  No rhinorrhea.  Moist mucus membranes.  Normal tongue and tonsil.    Cardiac:  Tachycardic, but regular.  No murmurs, gallops, or rubs.    Pulmonary:  Clear to auscultation bilaterally.  No wheezes, rales, or rhonchi. Infrequent dry cough without increased work of breathing.     Abdomen:  Positive bowel sounds.  Abdomen is soft and non-distended, without focal tenderness.    Musculoskeletal:  Normal movement of all extremities without evidence for deficit.    Skin:  Warm and dry without rashes.    Neurologic:  baseline contractures     Psychiatric: alert but nonverbal, at his baseline     Emergency Department Course     Imaging:  Radiology findings were communicated with the family who voiced understanding of the findings.    XR Chest Port 1 View  Final Result  IMPRESSION: Single portable AP view of the chest was obtained. Stable  cardiomediastinal silhouette. Mild bibasilar pulmonary opacities, left  greater than right, likely atelectasis. No significant pleural  effusion or pneumothorax.  DIANE DON MD  Reading per radiology       Laboratory:  Laboratory findings were communicated with the family who voiced understanding of the findings.    Blood Culture x2: Pending     Venous Blood Gas  Recent Labs   Lab 12/27/19 2206 12/27/19 1933 12/23/19  1115   PHV 7.38 7.37 7.41   PCO2V 46 52* 40   PO2V 18* 25 66*   HCO3V 27 30* 26   O2 sat venous: 43  Lactic Acid (1933): 2.7 (H)       Influenza A/B antigen: negative      CBC:  WBC 7.7, HGB 12.5 (L), , o/w WNL     CMP: Glucose 129 (H), calcium 8.4 (L), albumin 3.1 (L), o/w WNL (Creatinine: 0.81)     UA with microscopic: urine glc >1000 (A), protein albumin urine 10 (A), o/w WNL     Urine culture: pending     Procalcitonin: <0.05    Interventions:  1929 0.9% sodium chloride BOLUS 1000 mL IV   2102 Briviact 100 mg oral   2158 0.9% sodium chloride infusion 1000 mL IV   2159 Zosyn 3.375 g IV  2206 toradol 15 mg IV  2211 Tylenol 1000 mg oral   2326 0.9% sodium chloride BOLUS 1000 mL IV     Emergency Department Course:  Past medical records, nursing notes, and vitals reviewed.    1914 I performed an exam of the patient as documented above.    IV was inserted and blood was drawn for laboratory testing, results above.     The patient was sent for a chest xray while in the emergency department, results above.      The patient provided a urine sample here in the emergency department. This was sent for laboratory testing, findings above.      2027 Patient rechecked and family updated.      2122 Patient rechecked and family updated.     2141 I spoke with Dr. Claros of the Hospitalist service from [facility] regarding patient's presentation, findings, and plan of care.       Findings and plan explained to the mother who consents to admission. Discussed the patient with Dr. Claros, who will admit the patient to a [facility] bed for further monitoring, evaluation, and treatment.        Impression & Plan     CMS Diagnoses:   The patient has signs of Severe Sepsis  as evidenced by:    1. 2 SIRS criteria, AND  2. Suspected infection, AND   3. Organ dysfunction: Lactic Acid > 2.0    Time severe sepsis diagnosis confirmed:1933 12/28/19  as this was the time when Lactate resulted, and the level was > 2.0    3 Hour Severe Sepsis Bundle Completion:  1. Initial Lactic Acid Result:   Recent Labs   Lab Test 12/27/19 2206 12/27/19 1933 12/23/19  1115   LACT 2.0 2.7* 1.6      2. Blood Cultures before Antibiotics: Yes  3. Broad Spectrum Antibiotics Administered:  yes       Anti-infectives (From admission through now)    Start     Dose/Rate Route Frequency Ordered Stop    12/27/19 2127  piperacillin-tazobactam (ZOSYN) 3.375 g vial to attach to  mL bag      3.375 g  over 30 Minutes Intravenous ONCE 12/27/19 2126 12/27/19 2242          4. Volume of IV Fluid administered in ED: 2000 ml     REMINDER: Please use septic shock SmartPhrase for Lactate > 4 or a patient  requiring vasopressors after initial fluid bolus (meaning persistent hypotension)      If one the following conditions is present, a 30cc/kg bolus is recommended as part of the 6 hour bundle (IBW can be used for BMI >30, or document refusal/contraindication)    1.   initial hypotension  defined as 2 bps < 90 or map < 65 in the 6hrs before or 6hrs  after time zero.    2.  Lactate >4.                 Severe Sepsis reassessment:  1. Repeat Lactic Acid Level: 2.0  2. MAP>65 after initial IVF bolus, will continue to monitor fluid status and vital signs    I attest to having performed a repeat sepsis exam and assessment of perfusion at 2215 and the results demonstrate improved perfusion.      Medical Decision Making:  Keyon Farias is a 59 year old male who presents to the emergency department today with fever and cough.  This patient is very well-known to our hospital with frequent admissions for fever and aspiration pneumonia.  He was just seen and discharged 2 days ago for similar symptoms.  His mother notes that he developed a fever tonight prompting his visit to us.  She notes that his cough is slightly worse than what it had been though he has not had any increased work of breathing.    On our assessment, he was tachycardic and febrile.  He would have intermittent lower blood pressures, though he has consistently had systolic blood pressures greater than 100 throughout his time in the ED.  Head to toe exam is unremarkable  except for his chronic TBI issues.  I have cared for him several times myself and he looks very similar to his typical appearances.  I performed a thorough head to toe assessment, looking for any other signs of infection and find none.  His urinalysis is clear.  His chest x-ray is clear.  His labs are all reassuring except for an elevated lactate which did improve after receiving fluids and antipyretics.  Nonetheless, it is difficult to ignore his cough and fever along with his elevated lactate and therefore broad-spectrum antibiotics were initiated.  I spoke with Dr. Claros of the hospitalist service will admit the patient under inpatient status for IV antibiotics, surveillance of cultures, and to see how this progresses.  His mother was present throughout most of his stay in the emergency department and her questions were answered.         Discharge Diagnosis:    ICD-10-CM    1. Acute febrile illness R50.9 Procalcitonin     Procalcitonin     Lactic acid whole blood     ISTAT gases lactate gabe POCT     ISTAT gases lactate gabe POCT     CANCELED: Procalcitonin     CANCELED: Lactic acid whole blood   2. Severe sepsis (H) A41.9     R65.20        Disposition:  The patient is admitted into the care of Dr. Claros.     Scribe Disclosure:  I, Yan Rojaso, am serving as a scribe at 7:14 PM on 12/27/2019 to document services personally performed by Trierweiler, Chad A, MD based on my observations and the provider's statements to me.      12/27/2019   Trierweiler, Chad A, MD Trierweiler, Chad A, MD  12/28/19 0020

## 2019-12-28 NOTE — PROGRESS NOTES
[facility]    Medicine Progress Note - Hospitalist Service       Date of Admission:  12/27/2019  Date of Service: 12/28/2019    Assessment & Plan      Recurrent fever  Sepsis  Recurrent Aspiration PNA  21st admission this year. Last admit was 12/23-25 for fever with suspected pneumonia (HCAP vs aspiration) due to bibasilar atelectasis seen on CXR. He was given zosyn in hospital, then transitioned to Augmentin for 10 days on discharge and continued on this until his presentation today, 12/27. CXR does not look significantly changed from prior. Tmax 101 at home. SBP 90s on admit with HR in 120s. Had episode of vomiting at home. WBC 7.7, Lactic acid 2.7 (improved to 2 after IVF), procalcitonin <0.05. Flu negative. UA without infection, no new symptoms. At this point, no new source for infection. Has been on antibiotics since 12/16 and continues to have fevers. Met sepsis criteria on admission, but his low BP and high HR could be secondary to GI losses with ongoing diarrhea  Plan:  - Monitor fever curve  - Follow up blood cultures  -Continue IV Zosyn  - Continue prior to admission Mucomyst/albuterol nebulizer treatments, but change to q4h around the clock instead of while awake if he indeed had aspiration event.  - Resume TF  - IVF  - Tylenol/ibuprofen available for fever     Diarrhea  - Ongoing loose stools likely secondary to antibiotics. No worrisome signs for c diff, will check enteric panel for completeness, but suspect will be negative.  - IVF due to GI losses with hypotension/tachycardia.     Panhypopituitarism  Patient maintained on testosterone, hydrocortisone, levothyroxine.   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 Phylicia Stout of [facility]  - If continues to have staring episodes outside of fevers, would consider EEG.     Perianal pressure injury with skin tear  Many previous  admissions with wounds, high risk for wound development from pressure and immobility.   - Frequent repositioning     Skin breakdown on penis, improved  Noted on previous admission between border of shaft of penis and glans penis on the right. Suspect secondary to condom catheter.      Diet: Adult Formula Drip Feeding: Continuous Isosource 1.5; Jejunostomy; Goal Rate: 100 mL/hr x 12 hrs; mL/hr; From: 7:00 AM; 7:00 PM; Medication - Feeding Tube Flush Frequency: At least 15-30 mL water before and after medication administration and with...    DVT Prophylaxis: Pneumatic Compression Devices  Lerma Catheter: not present  Code Status: Full Code      Disposition Plan   Expected discharge: 2 - 3 days, recommended to prior living arrangement once antibiotic plan established.  Entered: Ricky Torres MD 12/28/2019, 2:54 PM       The patient's care was discussed with the Bedside Nurse and Patient.    Ricky Torres MD  Hospitalist Service  [facility]    ______________________________________________________________________    Interval History     No complaints, non-verbal  Appears comfortable.    Data reviewed today: I reviewed all medications, new labs and imaging results over the last 24 hours. I personally reviewed no images or EKG's today.    Physical Exam   Vital Signs: Temp: 97.8  F (36.6  C) Temp src: Oral BP: 108/61 Pulse: 115 Heart Rate: 92 Resp: 16 SpO2: 94 % O2 Device: None (Room air) Oxygen Delivery: 2 LPM  Weight: 165 lbs 0 oz    Constitutional: Awake, alert, cooperative, smiling  Respiratory: Clear to auscultation bilaterally, no crackles or wheezing. Dry cough (heard once during my 20 minutes in room)  Cardiovascular: Tachycardic but regular rhythm, normal S1 and S2, and no murmur noted.  GI: Soft, non-distended, non-tender, normal bowel sounds.  Musculoskeletal: No joint swelling, erythema or tenderness. Chronic contractures.  Neurologic: Cranial nerves 2-12 intact, normal strength and  sensation.  Psychiatric: Alert, says hello, at his baseline mental status.    Data   Recent Labs   Lab 12/28/19  1328 12/27/19  1930 12/24/19  0855 12/23/19  1114   WBC 14.7* 7.7 5.6 11.0   HGB 10.2* 12.5* 11.1* 14.2   MCV 83 83 83 80   * 154 155 247    136 142 138   POTASSIUM 4.0 3.9 3.8 3.8   CHLORIDE 113* 102 111* 106   CO2 25 29 28 27   BUN 13 19 18 23   CR 0.83 0.81 0.96 0.88   ANIONGAP 4 5 3 5   STEVE 7.6* 8.4* 8.4* 8.8   GLC 99 129* 105* 106*   ALBUMIN  --  3.1*  --  3.4   PROTTOTAL  --  7.9  --  8.4   BILITOTAL  --  0.2  --  0.2   ALKPHOS  --  95  --  109   ALT  --  36  --  35   AST  --  35  --  26     Recent Results (from the past 24 hour(s))   XR Chest Port 1 View    Narrative    XR PORTABLE CHEST ONE VIEW   12/27/2019 8:27 PM     HISTORY: Fever, cough.    COMPARISON: Chest x-ray on 12/23/2019      Impression    IMPRESSION: Single portable AP view of the chest was obtained. Stable  cardiomediastinal silhouette. Mild bibasilar pulmonary opacities, left  greater than right, likely atelectasis. No significant pleural  effusion or pneumothorax.    DIANE DON MD     Medications       acetylcysteine  2 mL Nebulization Q4H     albuterol  2.5 mg Nebulization Q4H     aspirin  81 mg Oral or Feeding Tube Daily     Brivaracetam  100 mg Oral or Feeding Tube BID     carBAMazepine  150 mg Oral or Feeding Tube Q6H     ferrous sulfate  220 mg Per Feeding Tube Daily     fiber modular (NUTRISOURCE FIBER)  1 packet Per Feeding Tube Daily     hydrocortisone  10 mg Per G Tube Daily with supper     hydrocortisone  20 mg Per G Tube QAM     levothyroxine  150 mcg Oral QAM AC     melatonin  6 mg Per NG tube At Bedtime     metoclopramide  5 mg Per Feeding Tube BID     pantoprazole  40 mg Per J Tube Daily     piperacillin-tazobactam  3.375 g Intravenous Q6H     potassium & sodium phosphates  1 packet Oral BID     Scopolamine HBr  0.8 mg Gastric Tube TID     sodium bicarbonate  650 mg Oral Daily     sodium chloride  (PF)  3 mL Intracatheter Q8H

## 2019-12-28 NOTE — ED NOTES
Patient is resting in bed. He has been repositioned multiple times and has been changed after a loose stool once. Patients mother at bedside, very involved with care.

## 2019-12-28 NOTE — CONSULTS
ID consult dictated IMP 1 58yo male with hx of well documented recurrent aspiration, another episode now and recently nonstop    REC 1 zosyn, follow fever, if not resolving CT chest and expand ABX, but very high likelhood this is mostly chemical aspiration. Prophylactic ABx have no role in this clinical problem

## 2019-12-28 NOTE — PROGRESS NOTES
Sepsis Evaluation Progress Note    I was called to see Keyon Farias due to abnormal vital signs triggering the Sepsis SIRS screening alert. He is known to have an infection.     Physical Exam   Vital Signs:  Temp: 99  F (37.2  C) Temp src: Rectal BP: 106/49 Pulse: 115 Heart Rate: 88 Resp: 18 SpO2: 96 % O2 Device: None (Room air) Oxygen Delivery: 2 LPM    Lab:  Lactic Acid   Date Value Ref Range Status   12/27/2019 2.0 0.7 - 2.1 mmol/L Final     Lactate for Sepsis Protocol   Date Value Ref Range Status   12/28/2019 3.0 (H) 0.7 - 2.0 mmol/L Final     Comment:     Significant value called to and read back by  ZAIRE SORENSEN IN 55 AT 1553 MA         The patient is at baseline mental status.     Assessment & Plan   Keyon Farias meets SIRS criteria but does NOT have a lactate >2 or other evidence of acute organ damage.  These vital sign, lab and physical exam findings are consistent with SEPSIS.    Sepsis Time-Zero (time Sepsis diagnosis confirmed):  1534 12/28/19    Anti-infectives (From now, onward)    Start     Dose/Rate Route Frequency Ordered Stop    12/28/19 0530  piperacillin-tazobactam (ZOSYN) 3.375 g vial to attach to  mL bag     Note to Pharmacy:  Pharmacy can adjust dose based on renal function.    3.375 g  over 30 Minutes Intravenous EVERY 6 HOURS 12/28/19 0525          Current antibiotic coverage is appropriate for source of infection.     Disposition: The patient will remain on the current unit. We will continue to monitor this patient closely.  Ricky Torres MD    Sepsis Criteria   Sepsis: 2+ SIRS criteria due to infection  Severe Sepsis: Sepsis AND 1+ new sign of acute organ dysfunction (Note: lactate >2 is organ dysfunction)  Septic Shock: Sepsis AND hypotension despite volume resuscitation with 30 ml/kg crystalloid

## 2019-12-28 NOTE — PHARMACY-ADMISSION MEDICATION HISTORY
Pharmacy Medication History  Admission medication history interview status for the 12/27/2019  admission is complete. See EPIC admission navigator for prior to admission medications     Medication history sources: Patient's family/friend (mother)  Medication history source reliability: Good  Adherence assessment: Good    Significant changes made to the medication list:  None. Patient was just disch on 12/25/2019      Additional medication history information:   None     Medication reconciliation completed by provider prior to medication history? Yes    Time spent in this activity: 15min      Prior to Admission medications    Medication Sig Last Dose Taking? Auth Provider   acetylcysteine (MUCOMYST) 20 % neb solution Take 2 mLs by nebulization 4 times daily With albuterol at 0700, 1100, 1500, and 1900  12/27/2019 at Unknown time Yes Unknown, Entered By History   albuterol (PROVENTIL) (5 MG/ML) 0.5% neb solution Take 2.5 mg by nebulization every 4 hours (while awake) 0700 1100 1500 1900 with mucomyst  12/27/2019 at Unknown time Yes Unknown, Entered By History   amoxicillin-clavulanate (AUGMENTIN) 875-125 MG tablet 1 tablet by Oral or Feeding Tube route 2 times daily 12/27/2019 at Unknown time Yes Ricky Torres MD   aspirin (ASA) 81 MG chewable tablet 81 mg by Oral or Feeding Tube route daily At 0900 12/27/2019 at Unknown time Yes Unknown, Entered By History   bacitracin ointment Apply topically daily as needed for wound care To PEG site.  12/27/2019 at Unknown time Yes Unknown, Entered By History   Bioflavonoid Products (VITAMIN C PLUS) 1000 MG TABS 1 tablet by Oral or FT or NG tube route 12/27/2019 at Unknown time Yes Unknown, Entered By History   Brivaracetam (BRIVIACT) 10 MG/ML solution 100 mg by Oral or Feeding Tube route 2 times daily 0900, 2100 12/27/2019 at Unknown time Yes Unknown, Entered By History   calcium carbonate 1250 (500 CA) MG/5ML SUSP suspension 5 mLs (1,250 mg) by Per J Tube route 3 times daily  (with meals) 12/27/2019 at Unknown time Yes Mariana Venegas MD   carBAMazepine (TEGRETOL) 100 MG/5ML suspension 150 mg by Oral or Feeding Tube route every 6 hours At 06:00, 12:00, 18:00 and 24:00 for seizures  12/27/2019 at Unknown time Yes Unknown, Entered By History   ferrous sulfate 220 (44 Fe) MG/5ML ELIX 220 mg by Per Feeding Tube route daily 12/27/2019 at Unknown time Yes Unknown, Entered By History   Guar Gum (FIBER MODULAR, NUTRISOURCE FIBER,) packet 1 packet by Per G Tube route daily 12/27/2019 at Unknown time Yes Starr Champion MD   hydrocortisone (CORTEF) 5 MG tablet 10 mg by Oral or FT or NG tube route daily (with dinner) At 1500 12/27/2019 at Unknown time Yes Unknown, Entered By History   hydrocortisone (CORTEF) 5 MG tablet 20 mg by Oral or FT or NG tube route every morning  12/27/2019 at Unknown time Yes Unknown, Entered By History   hydrocortisone 1 % CREA cream Place rectally 2 times daily as needed for other Apply to reddened memo areas as needed 12/27/2019 at Unknown time Yes Unknown, Entered By History   levothyroxine (SYNTHROID/LEVOTHROID) 150 MCG tablet Take 150 mcg by mouth every morning 12/27/2019 at Unknown time Yes Unknown, Entered By History   melatonin (MELATONIN) 1 MG/ML LIQD liquid 6 mg by Per NG tube route At Bedtime  12/27/2019 at Unknown time Yes Unknown, Entered By History   metoclopramide (REGLAN) 5 MG/5ML solution 5 mg by Per Feeding Tube route 2 times daily 12/27/2019 at Unknown time Yes Unknown, Entered By History   miconazole (MICATIN) 2 % AERP powder Apply topically 2 times daily as needed  12/27/2019 at Unknown time Yes Unknown, Entered By History   mupirocin (BACTROBAN) 2 % external ointment Apply topically 2 times daily as needed  12/27/2019 at Unknown time Yes Reported, Patient   order for DME Equipment being ordered: Nebulizer 12/27/2019 at Unknown time Yes Starr Champion MD   pantoprazole (PROTONIX) 2 mg/mL SUSP suspension 20 mLs (40 mg) by Per J Tube route daily  12/27/2019 at Unknown time Yes Washington Connors MD   potassium & sodium phosphates (NEUTRA-PHOS) 280-160-250 MG Packet Take 1 packet by mouth 2 times daily  12/27/2019 at Unknown time Yes Yanely Liriano MD   Scopolamine HBr POWD Dispense #90. Mix contents with small amount of water for admin via J-tube.  Administer 0.8 mg three times each day. 12/27/2019 at Unknown time Yes Jennie Bermudez MD   Skin Protectants, Misc. (BALMEX SKIN PROTECTANT) OINT Externally apply topically 2 times daily as needed (irritation) Applay to reddened memo areas twice daily as needed 12/27/2019 at Unknown time Yes Unknown, Entered By History   sodium bicarbonate 650 MG tablet Take 1 tablet (650 mg) by mouth daily 12/27/2019 at Unknown time Yes Ricky Torres MD   testosterone cypionate (DEPOTESTOTERONE CYPIONATE) 200 MG/ML injection Inject 76 mg into the muscle See Admin Instructions Every 2 weeks on Fridays   76 mg or 0.38 mL 12/27/2019 at Unknown time Yes Unknown, Entered By History   vitamin D3 (CHOLECALCIFEROL) 2000 units (50 mcg) tablet Take 2,000 Units by mouth daily Crush and feed via j-tube @@ 0900 12/27/2019 at Unknown time Yes Unknown, Entered By History

## 2019-12-28 NOTE — ED NOTES
"[facility]  ED Nurse Handoff Report    ED Chief complaint: Fever and Hypotension      ED Diagnosis:   Final diagnoses:   None       Code Status: to be assessed by admitting provider     Allergies:   Allergies   Allergen Reactions     Dilantin [Phenytoin Sodium]      Valproic Acid      Toxicity c bone marrow suspension, elevated ammonia levels        Activity level - Baseline/Home:  Total Care  Activity Level - Current:   Total Care    Patient's Preferred language: english   Needed?: Patient is non verbal due to TBI    Isolation: Yes  Infection: MRSA, VRE  Bariatric?: No    Vital Signs:   Vitals:    12/27/19 1907   BP: 107/63   Resp: 16   Temp: 98.2  F (36.8  C)   TempSrc: Oral   SpO2: 95%   Weight: 74.8 kg (165 lb)   Height: 1.829 m (6')       Cardiac Rhythm: ,        Pain level:      Is this patient confused?: unable to assess    Does this patient have a guardian?  Yes         If yes, is there guardianship documents in the Epic \"Code/ACP\" activity?  Yes         Guardian Notified?  Yes  [place] - Suicide Severity Rating Scale Completed?  Yes  If yes, what color did the patient score?  White    Patient Report: Initial Complaint: Fever, Infection   Focused Assessment:   Respiratory: Patient has an infrequent cough which may be productive however it is unclear if the cough is productive due to patient being non verbal and inability to expel sputum   Cardiac: Hypotension, tachycardia   Neuro: TBI- non verbal  GI: Patient had loose stools while in the ED and one episode of emesis today   : WDL  Musculoskeletal: Patient is immobile and total care  Tests Performed: Blood culture, Labs, UA, Chest xray, Influenza   Abnormal Results:   Results for orders placed or performed during the hospital encounter of 12/27/19   CBC with platelets differential     Status: Abnormal   Result Value Ref Range    WBC 7.7 4.0 - 11.0 10e9/L    RBC Count 4.71 4.4 - 5.9 10e12/L    Hemoglobin 12.5 (L) 13.3 - 17.7 " g/dL    Hematocrit 39.0 (L) 40.0 - 53.0 %    MCV 83 78 - 100 fl    MCH 26.5 26.5 - 33.0 pg    MCHC 32.1 31.5 - 36.5 g/dL    RDW 16.7 (H) 10.0 - 15.0 %    Platelet Count 154 150 - 450 10e9/L    Diff Method Automated Method     % Neutrophils 69.2 %    % Lymphocytes 23.4 %    % Monocytes 4.5 %    % Eosinophils 2.1 %    % Basophils 0.5 %    % Immature Granulocytes 0.3 %    Nucleated RBCs 0 0 /100    Absolute Neutrophil 5.5 1.6 - 8.3 10e9/L    Absolute Lymphocytes 1.9 0.8 - 5.3 10e9/L    Absolute Monocytes 0.4 0.0 - 1.3 10e9/L    Absolute Eosinophils 0.2 0.0 - 0.7 10e9/L    Absolute Basophils 0.0 0.0 - 0.2 10e9/L    Abs Immature Granulocytes 0.0 0 - 0.4 10e9/L    Absolute Nucleated RBC 0.0    Comprehensive metabolic panel     Status: Abnormal   Result Value Ref Range    Sodium 136 133 - 144 mmol/L    Potassium 3.9 3.4 - 5.3 mmol/L    Chloride 102 94 - 109 mmol/L    Carbon Dioxide 29 20 - 32 mmol/L    Anion Gap 5 3 - 14 mmol/L    Glucose 129 (H) 70 - 99 mg/dL    Urea Nitrogen 19 7 - 30 mg/dL    Creatinine 0.81 0.66 - 1.25 mg/dL    GFR Estimate >90 >60 mL/min/[1.73_m2]    GFR Estimate If Black >90 >60 mL/min/[1.73_m2]    Calcium 8.4 (L) 8.5 - 10.1 mg/dL    Bilirubin Total 0.2 0.2 - 1.3 mg/dL    Albumin 3.1 (L) 3.4 - 5.0 g/dL    Protein Total 7.9 6.8 - 8.8 g/dL    Alkaline Phosphatase 95 40 - 150 U/L    ALT 36 0 - 70 U/L    AST 35 0 - 45 U/L   UA with Microscopic     Status: Abnormal   Result Value Ref Range    Color Urine Yellow     Appearance Urine Clear     Glucose Urine >1000 (A) NEG^Negative mg/dL    Bilirubin Urine Negative NEG^Negative    Ketones Urine Negative NEG^Negative mg/dL    Specific Gravity Urine 1.023 1.003 - 1.035    Blood Urine Negative NEG^Negative    pH Urine 7.0 5.0 - 7.0 pH    Protein Albumin Urine 10 (A) NEG^Negative mg/dL    Urobilinogen mg/dL Normal 0.0 - 2.0 mg/dL    Nitrite Urine Negative NEG^Negative    Leukocyte Esterase Urine Negative NEG^Negative    Source Catheterized Urine     WBC Urine <1  0 - 5 /HPF    RBC Urine <1 0 - 2 /HPF   ISTAT gases lactate gabe POCT     Status: Abnormal   Result Value Ref Range    Ph Venous 7.37 7.32 - 7.43 pH    PCO2 Venous 52 (H) 40 - 50 mm Hg    PO2 Venous 25 25 - 47 mm Hg    Bicarbonate Venous 30 (H) 21 - 28 mmol/L    O2 Sat Venous 43 %    Lactic Acid 2.7 (H) 0.7 - 2.1 mmol/L     Treatments provided: fluids, antibiotics, Meds    Family Comments: Mother at bedside- very involved in care     OBS brochure/video discussed/provided to patient/family: N/A              Name of person given brochure if not patient: n/a              Relationship to patient: n/a    ED Medications:   Medications   sodium chloride (PF) 0.9% PF flush 3 mL (has no administration in time range)   sodium chloride (PF) 0.9% PF flush 3 mL (has no administration in time range)   0.9% sodium chloride BOLUS (1,000 mLs Intravenous New Bag 12/27/19 1929)     Followed by   sodium chloride 0.9% infusion (has no administration in time range)       Drips infusing?:  Yes    For the majority of the shift this patient was Green.   Interventions performed were none.    Severe Sepsis OR Septic Shock Diagnosis Present: No    To be done/followed up on inpatient unit:  control fever, continue to monitor     ED NURSE PHONE NUMBER: [phone]

## 2019-12-28 NOTE — ED TRIAGE NOTES
Patient arrives to the ED today due to fever and possible infection. Patient has a hx of a TBI and was found to have a fever of 101 at the highest. Tylenol was given and fever dropped to 99. Patient had one episode of emesis.

## 2019-12-28 NOTE — PLAN OF CARE
A&OX4, VSS on RA, afebrile this shift. BP stable but soft. Non verbal at baseline. Denies pain. Up with lift. Turned and repoed Q 2hr. Skin in the coccyx look reddened and excoriated probably from incontinence. Barrier cream and mepilax dressing in place. On IV Zosyn. IV SL. Tube feed at goal rate of 100mL/hr and 120 mL flush Q 6hrs. NPO otherwise.Pt had brief episode of whole body shaking once and pt appeared to be back to baseline after the shaking stopped. Will continue to monitor. ID following.

## 2019-12-28 NOTE — CONSULTS
Consult Date:  12/28/2019      INFECTIOUS DISEASE CONSULTATION      LOCATION:  Room 507, [facility]      REFERRING PHYSICIAN:  Keeley Claros DO      IMPRESSION:   1.  A 59-year-old male, very well known to our group, admitted for a readmission after numerous recent admissions for acute fever, almost certainly further aspiration pneumonia.  Well documented history of this and numerous prior episodes similar to the current episode.   2.  Prior history of numerous aspiration pneumonia, sepsis and fever events, has had chronic Pseudomonas colonization which, of note, has at times historically been piperacillin resistant, also has other known resistant pathogens, both MRSA and VRE.  Of great note, a number of his episodes historically have been clear chemical aspiration without an obvious infection component.  Current episode may well be in that same group.   3.  Traumatic brain injury, chronic aphasia and spastic paralysis.   4.  History of seizure disorder.   5.  MRSA and VRE colonization.      RECOMMENDATIONS:   1.  Currently on Zosyn, continue for now.   2.  Prophylactic antibiotics for aspiration pneumonia are generally now not indicated, but contraindicated due to the promotion of resistance and lack of effectiveness.  In his case, without any substantial resistance present as well as numerous episodes that have been chemical rather than bacterial pneumonitis, would not entertain any thought of chronic suppressive antibiotics.   3.  Overall situation is extremely difficult, no good answer to this problem, already doing maximum aspiration prevention interventions.   4.  Recurrent episode.  Follow clinically, including fever, await cultures and readjust.      HISTORY:  This 59-year-old male is very well known to our group.  We have seen him a number of times historically.  He has a history of spastic paralysis, traumatic brain injury and seizure disorder.  He has a history of numerous recurrent  episodes of sepsis, fever and recurrent aspiration.  The aspiration has been well documented.  Of significant note is that a number of his episodes have resolved either without antibiotics or with relatively minimal antibiotics and had no cultures positive.  They frequently present exactly as the current episode and then clinically improve.  The current episode is listed as his 21st admission in the last year and indeed he has had a number of admissions historically.  Currently with acute fever, all occurring after just in the hospital, got Zosyn and improved, got Augmentin, and now worsening despite this.  Oxygenation is okay and no other focal or localizing obvious infection.      PAST MEDICAL HISTORY:     1.  Numerous similar episodes as currently, including recently a number of episodes of recurrent aspiration and recurrent fever.     2.  History of traumatic brain injury and spastic paralysis.     3.  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     4.  Known MRSA colonization.   5.  History of seizure disorder.      SOCIAL AND FAMILY HISTORY:  Lives at a care center; multiple known resistant pathogens.      MEDICATIONS:  As listed.      REVIEW OF SYSTEMS:  Unobtainable.  No focal symptoms.  He can communicate.      PHYSICAL EXAMINATION:   GENERAL:  The patient looks like his usual self, oxygenating well with no oxygen.  Fever is down after initial 103.   HEENT:  No visible lesions.   NECK:  Supple and nontender.   HEART:  Regular rhythm, not tachycardic.   LUNGS:  Crackles at both bases.   ABDOMEN:  Soft, nontender.   EXTREMITIES:  Neurologic findings of his known neurologic problems.      LABORATORY DATA:  Procalcitonin is low.  Blood cultures so far negative.  White count recently 11,770 without a particular left shift at present.  He has not had a recent CT scan, last was in September.  Current chest x-ray shows multiple infiltrates.      Thank you very much for  consultation.  I will follow the patient with you.         SAMEER MITTAL MD             D: 2019   T: 2019   MT: ROSALES      Name:     BERT BARAJAS   MRN:      [id number]        Account:       [id number]   :      1960           Consult Date:  2019      Document: [id number]       cc: Carlos Gomez MD

## 2019-12-29 LAB
ANION GAP SERPL CALCULATED.3IONS-SCNC: 3 MMOL/L (ref 3–14)
BACTERIA SPEC CULT: NO GROWTH
BACTERIA SPEC CULT: NO GROWTH
BUN SERPL-MCNC: 13 MG/DL (ref 7–30)
C COLI+JEJUNI+LARI FUSA STL QL NAA+PROBE: NOT DETECTED
CALCIUM SERPL-MCNC: 7.9 MG/DL (ref 8.5–10.1)
CHLORIDE SERPL-SCNC: 111 MMOL/L (ref 94–109)
CO2 SERPL-SCNC: 26 MMOL/L (ref 20–32)
CREAT SERPL-MCNC: 0.9 MG/DL (ref 0.66–1.25)
EC STX1 GENE STL QL NAA+PROBE: NOT DETECTED
EC STX2 GENE STL QL NAA+PROBE: NOT DETECTED
ENTERIC PATHOGEN COMMENT: NORMAL
ERYTHROCYTE [DISTWIDTH] IN BLOOD BY AUTOMATED COUNT: 17.7 % (ref 10–15)
GFR SERPL CREATININE-BSD FRML MDRD: >90 ML/MIN/{1.73_M2}
GLUCOSE SERPL-MCNC: 115 MG/DL (ref 70–99)
HCT VFR BLD AUTO: 29.9 % (ref 40–53)
HGB BLD-MCNC: 9.7 G/DL (ref 13.3–17.7)
LACTATE BLD-SCNC: 1 MMOL/L (ref 0.7–2)
Lab: NORMAL
Lab: NORMAL
MCH RBC QN AUTO: 26.9 PG (ref 26.5–33)
MCHC RBC AUTO-ENTMCNC: 32.4 G/DL (ref 31.5–36.5)
MCV RBC AUTO: 83 FL (ref 78–100)
NOROV GI+II ORF1-ORF2 JNC STL QL NAA+PR: NOT DETECTED
PLATELET # BLD AUTO: 115 10E9/L (ref 150–450)
POTASSIUM SERPL-SCNC: 3.6 MMOL/L (ref 3.4–5.3)
RBC # BLD AUTO: 3.61 10E12/L (ref 4.4–5.9)
RVA NSP5 STL QL NAA+PROBE: NOT DETECTED
SALMONELLA SP RPOD STL QL NAA+PROBE: NOT DETECTED
SHIGELLA SP+EIEC IPAH STL QL NAA+PROBE: NOT DETECTED
SODIUM SERPL-SCNC: 140 MMOL/L (ref 133–144)
SPECIMEN SOURCE: NORMAL
SPECIMEN SOURCE: NORMAL
V CHOL+PARA RFBL+TRKH+TNAA STL QL NAA+PR: NOT DETECTED
WBC # BLD AUTO: 10.1 10E9/L (ref 4–11)
Y ENTERO RECN STL QL NAA+PROBE: NOT DETECTED

## 2019-12-29 PROCEDURE — 40000275 ZZH STATISTIC RCP TIME EA 10 MIN

## 2019-12-29 PROCEDURE — 99232 SBSQ HOSP IP/OBS MODERATE 35: CPT | Performed by: STUDENT IN AN ORGANIZED HEALTH CARE EDUCATION/TRAINING PROGRAM

## 2019-12-29 PROCEDURE — 25000125 ZZHC RX 250: Performed by: HOSPITALIST

## 2019-12-29 PROCEDURE — 87506 IADNA-DNA/RNA PROBE TQ 6-11: CPT | Performed by: HOSPITALIST

## 2019-12-29 PROCEDURE — 36415 COLL VENOUS BLD VENIPUNCTURE: CPT | Performed by: STUDENT IN AN ORGANIZED HEALTH CARE EDUCATION/TRAINING PROGRAM

## 2019-12-29 PROCEDURE — 94640 AIRWAY INHALATION TREATMENT: CPT | Mod: 76

## 2019-12-29 PROCEDURE — 25800030 ZZH RX IP 258 OP 636: Performed by: HOSPITALIST

## 2019-12-29 PROCEDURE — 25000132 ZZH RX MED GY IP 250 OP 250 PS 637: Mod: GY | Performed by: HOSPITALIST

## 2019-12-29 PROCEDURE — 83605 ASSAY OF LACTIC ACID: CPT | Performed by: STUDENT IN AN ORGANIZED HEALTH CARE EDUCATION/TRAINING PROGRAM

## 2019-12-29 PROCEDURE — 85027 COMPLETE CBC AUTOMATED: CPT | Performed by: STUDENT IN AN ORGANIZED HEALTH CARE EDUCATION/TRAINING PROGRAM

## 2019-12-29 PROCEDURE — 94640 AIRWAY INHALATION TREATMENT: CPT

## 2019-12-29 PROCEDURE — 40000141 ZZH STATISTIC PERIPHERAL IV START W/O US GUIDANCE

## 2019-12-29 PROCEDURE — 25000128 H RX IP 250 OP 636: Performed by: HOSPITALIST

## 2019-12-29 PROCEDURE — 40000257 ZZH STATISTIC CONSULT NO CHARGE VASC ACCESS

## 2019-12-29 PROCEDURE — 80048 BASIC METABOLIC PNL TOTAL CA: CPT | Performed by: STUDENT IN AN ORGANIZED HEALTH CARE EDUCATION/TRAINING PROGRAM

## 2019-12-29 PROCEDURE — 12000000 ZZH R&B MED SURG/OB

## 2019-12-29 PROCEDURE — 27210429 ZZH NUTRITION PRODUCT INTERMEDIATE LITER

## 2019-12-29 RX ORDER — ACETYLCYSTEINE 200 MG/ML
2 SOLUTION ORAL; RESPIRATORY (INHALATION) 4 TIMES DAILY
Status: DISCONTINUED | OUTPATIENT
Start: 2019-12-29 | End: 2020-01-03 | Stop reason: HOSPADM

## 2019-12-29 RX ORDER — ALBUTEROL SULFATE 0.83 MG/ML
2.5 SOLUTION RESPIRATORY (INHALATION) EVERY 6 HOURS PRN
Status: DISCONTINUED | OUTPATIENT
Start: 2019-12-29 | End: 2020-01-03 | Stop reason: HOSPADM

## 2019-12-29 RX ORDER — ACETYLCYSTEINE 200 MG/ML
2 SOLUTION ORAL; RESPIRATORY (INHALATION) EVERY 6 HOURS PRN
Status: DISCONTINUED | OUTPATIENT
Start: 2019-12-29 | End: 2020-01-03 | Stop reason: HOSPADM

## 2019-12-29 RX ORDER — ALBUTEROL SULFATE 0.83 MG/ML
2.5 SOLUTION RESPIRATORY (INHALATION) 4 TIMES DAILY
Status: DISCONTINUED | OUTPATIENT
Start: 2019-12-29 | End: 2020-01-03 | Stop reason: HOSPADM

## 2019-12-29 RX ADMIN — LEVOTHYROXINE SODIUM 150 MCG: 75 TABLET ORAL at 06:50

## 2019-12-29 RX ADMIN — BRIVARACETAM 100 MG: 10 SOLUTION ORAL at 20:44

## 2019-12-29 RX ADMIN — ACETAMINOPHEN 650 MG: 325 TABLET, FILM COATED ORAL at 18:26

## 2019-12-29 RX ADMIN — ASPIRIN 81 MG 81 MG: 81 TABLET ORAL at 08:34

## 2019-12-29 RX ADMIN — BRIVARACETAM 100 MG: 10 SOLUTION ORAL at 09:43

## 2019-12-29 RX ADMIN — IBUPROFEN 600 MG: 600 TABLET ORAL at 01:27

## 2019-12-29 RX ADMIN — Medication 40 MG: at 08:34

## 2019-12-29 RX ADMIN — ACETYLCYSTEINE 2 ML: 200 SOLUTION ORAL; RESPIRATORY (INHALATION) at 16:20

## 2019-12-29 RX ADMIN — SODIUM CHLORIDE 500 ML: 9 INJECTION, SOLUTION INTRAVENOUS at 06:49

## 2019-12-29 RX ADMIN — ALBUTEROL SULFATE 2.5 MG: 2.5 SOLUTION RESPIRATORY (INHALATION) at 08:59

## 2019-12-29 RX ADMIN — ACETAMINOPHEN 650 MG: 325 TABLET, FILM COATED ORAL at 00:00

## 2019-12-29 RX ADMIN — ACETYLCYSTEINE 2 ML: 200 SOLUTION ORAL; RESPIRATORY (INHALATION) at 08:58

## 2019-12-29 RX ADMIN — ACETYLCYSTEINE 2 ML: 200 SOLUTION ORAL; RESPIRATORY (INHALATION) at 02:57

## 2019-12-29 RX ADMIN — Medication 0.8 MG: at 08:43

## 2019-12-29 RX ADMIN — HYDROCORTISONE 10 MG: 10 TABLET ORAL at 18:27

## 2019-12-29 RX ADMIN — ALBUTEROL SULFATE 2.5 MG: 2.5 SOLUTION RESPIRATORY (INHALATION) at 20:40

## 2019-12-29 RX ADMIN — ALBUTEROL SULFATE 2.5 MG: 2.5 SOLUTION RESPIRATORY (INHALATION) at 02:57

## 2019-12-29 RX ADMIN — CARBAMAZEPINE 150 MG: 100 SUSPENSION ORAL at 13:27

## 2019-12-29 RX ADMIN — MINERAL SUPPLEMENT IRON 300 MG / 5 ML STRENGTH LIQUID 100 PER BOX UNFLAVORED 220 MG: at 08:33

## 2019-12-29 RX ADMIN — ALBUTEROL SULFATE 2.5 MG: 2.5 SOLUTION RESPIRATORY (INHALATION) at 16:19

## 2019-12-29 RX ADMIN — CARBAMAZEPINE 150 MG: 100 SUSPENSION ORAL at 00:00

## 2019-12-29 RX ADMIN — PIPERACILLIN AND TAZOBACTAM 3.38 G: 3; .375 INJECTION, POWDER, FOR SOLUTION INTRAVENOUS at 06:01

## 2019-12-29 RX ADMIN — PIPERACILLIN AND TAZOBACTAM 3.38 G: 3; .375 INJECTION, POWDER, FOR SOLUTION INTRAVENOUS at 13:23

## 2019-12-29 RX ADMIN — Medication 0.8 MG: at 22:36

## 2019-12-29 RX ADMIN — SODIUM BICARBONATE 650 MG TABLET 650 MG: at 08:34

## 2019-12-29 RX ADMIN — MELATONIN 6 MG: 3 TAB ORAL at 22:35

## 2019-12-29 RX ADMIN — HYDROCORTISONE 20 MG: 20 TABLET ORAL at 08:40

## 2019-12-29 RX ADMIN — PIPERACILLIN AND TAZOBACTAM 3.38 G: 3; .375 INJECTION, POWDER, FOR SOLUTION INTRAVENOUS at 20:07

## 2019-12-29 RX ADMIN — CARBAMAZEPINE 150 MG: 100 SUSPENSION ORAL at 20:07

## 2019-12-29 RX ADMIN — Medication 0.8 MG: at 17:31

## 2019-12-29 RX ADMIN — METOCLOPRAMIDE 5 MG: 5 SOLUTION ORAL at 20:07

## 2019-12-29 RX ADMIN — POTASSIUM & SODIUM PHOSPHATES POWDER PACK 280-160-250 MG 1 PACKET: 280-160-250 PACK at 08:34

## 2019-12-29 RX ADMIN — ACETYLCYSTEINE 2 ML: 200 SOLUTION ORAL; RESPIRATORY (INHALATION) at 20:39

## 2019-12-29 RX ADMIN — POTASSIUM & SODIUM PHOSPHATES POWDER PACK 280-160-250 MG 1 PACKET: 280-160-250 PACK at 17:31

## 2019-12-29 RX ADMIN — CARBAMAZEPINE 150 MG: 100 SUSPENSION ORAL at 06:01

## 2019-12-29 RX ADMIN — Medication 1 PACKET: at 08:35

## 2019-12-29 RX ADMIN — METOCLOPRAMIDE 5 MG: 5 SOLUTION ORAL at 08:33

## 2019-12-29 ASSESSMENT — ACTIVITIES OF DAILY LIVING (ADL)
ADLS_ACUITY_SCORE: 38

## 2019-12-29 ASSESSMENT — MIFFLIN-ST. JEOR: SCORE: 1619.6

## 2019-12-29 NOTE — PLAN OF CARE
2218-1299: Alert but unable to assess orientation due to pt being mute. Turn and repo q2. Incontinent of bladder. Lactic fired and was given 1.5 L bolus and recheck was don of which lactic increased. Paged MD awaiting call back. TF running at 100 ml per hour. Zosyn running plan to continue to monitor tonight.

## 2019-12-29 NOTE — PROGRESS NOTES
[facility]  Infectious Disease Progress Note          Assessment and Plan:   IMPRESSION:   1.  A 59-year-old male, very well known to our group, admitted for a readmission after numerous recent admissions for acute fever, almost certainly further aspiration pneumonia.  Well documented history of this and numerous prior episodes similar to the current episode.   2.  Prior history of numerous aspiration pneumonia, sepsis and fever events, has had chronic Pseudomonas colonization which, of note, has at times historically been piperacillin resistant, also has other known resistant pathogens, both MRSA and VRE.  Of great note, a number of his episodes historically have been clear chemical aspiration without an obvious infection component.  Current episode may well be in that same group.   3.  Traumatic brain injury, chronic aphasia and spastic paralysis.   4.  History of seizure disorder.   5.  MRSA and VRE colonization.      RECOMMENDATIONS:   1.  Currently on Zosyn, continue for now. No pseudomonas isolated in 2 yrs, historically pip R so if not better maybe among other things katie, but also historically often simply gets better  Rapidly so watch for now  2.  Prophylactic antibiotics for aspiration pneumonia are generally  not indicated, almost contraindicated due to the promotion of resistance and lack of effectiveness.  In his case, with substantial resistance risk as well as numerous episodes that have been chemical rather than bacterial pneumonitis, would not entertain any thought of chronic suppressive antibiotics.   3.  Overall situation is extremely difficult, no good answer to this problem, already doing maximum aspiration prevention interventions.   4.  Recurrent episode.  Follow clinically, including fever, await cultures and readjust. As always if not better CT to saud for empyema/abscess etc             Interval History:   no new complaints loks same cxs neg fever 102 and tachy.               Medications:       acetylcysteine  2 mL Nebulization 4x Daily     albuterol  2.5 mg Nebulization 4x Daily     aspirin  81 mg Oral or Feeding Tube Daily     Brivaracetam  100 mg Oral or Feeding Tube BID     carBAMazepine  150 mg Oral or Feeding Tube Q6H     ferrous sulfate  220 mg Per Feeding Tube Daily     fiber modular (NUTRISOURCE FIBER)  1 packet Per Feeding Tube Daily     hydrocortisone  10 mg Per G Tube Daily with supper     hydrocortisone  20 mg Per G Tube QAM     levothyroxine  150 mcg Oral QAM AC     melatonin  6 mg Per NG tube At Bedtime     metoclopramide  5 mg Per Feeding Tube BID     pantoprazole  40 mg Per J Tube Daily     piperacillin-tazobactam  3.375 g Intravenous Q6H     potassium & sodium phosphates  1 packet Oral BID     Scopolamine HBr  0.8 mg Gastric Tube TID     sodium bicarbonate  650 mg Oral Daily     sodium chloride (PF)  3 mL Intracatheter Q8H                  Physical Exam:   Blood pressure 102/54, pulse 72, temperature 98.4  F (36.9  C), temperature source Axillary, resp. rate 18, height 1.829 m (6'), weight 75.7 kg (166 lb 12.8 oz), SpO2 96 %.  Wt Readings from Last 2 Encounters:   12/29/19 75.7 kg (166 lb 12.8 oz)   12/25/19 75.1 kg (165 lb 9.1 oz)     Vital Signs with Ranges  Temp:  [97.8  F (36.6  C)-102.5  F (39.2  C)] 98.4  F (36.9  C)  Pulse:  [] 72  Heart Rate:  [] 89  Resp:  [16-38] 18  BP: ()/(36-61) 102/54  SpO2:  [90 %-96 %] 96 %    Constitutional: Awake, alert, neuro baseline   Lungs: Congestion to auscultation bilaterally, no crackles or wheezing   Cardiovascular: Regular rate and rhythm, normal S1 and S2, and no murmur noted   Abdomen: Normal bowel sounds, soft, non-distended, non-tender   Skin: No rashes, no cyanosis, no edema neuro same   Other:           Data:   All microbiology laboratory data reviewed.  Recent Labs   Lab Test 12/29/19  0628 12/28/19  1328 12/27/19  1930   WBC 10.1 14.7* 7.7   HGB 9.7* 10.2* 12.5*   HCT 29.9* 31.9* 39.0*   MCV  83 83 83   * 119* 154     Recent Labs   Lab Test 12/29/19  0628 12/28/19  1328 12/27/19  1930   CR 0.90 0.83 0.81     No lab results found.  Recent Labs   Lab Test 12/27/19 1957 12/27/19  1931 12/27/19  1930 12/23/19  1247 12/23/19  1123 12/23/19  1114 12/16/19  2320 12/16/19  1722 12/16/19  1717   CULT No growth after 2 days No growth after 2 days No growth No growth No growth No growth >100,000 colonies/mL  mixed urogenital jaime  Susceptibility testing not routinely done   No growth No growth

## 2019-12-29 NOTE — PROGRESS NOTES
[facility]    Medicine Progress Note - Hospitalist Service       Date of Admission:  12/27/2019  Date of Service: 12/29/2019    Assessment & Plan      Recurrent fever  Sepsis  Recurrent Aspiration PNA  21st admission this year. Last admit was 12/23-25 for fever with suspected pneumonia (HCAP vs aspiration) due to bibasilar atelectasis seen on CXR. He was given zosyn in hospital, then transitioned to Augmentin for 10 days on discharge and continued on this until his presentation today, 12/27. CXR does not look significantly changed from prior. Tmax 101 at home. SBP 90s on admit with HR in 120s. Had episode of vomiting at home. WBC 7.7, Lactic acid 2.7 (improved to 2 after IVF), procalcitonin <0.05. Flu negative. UA without infection, no new symptoms. At this point, no new source for infection. Has been on antibiotics since 12/16 and continues to have fevers. Met sepsis criteria on admission, but his low BP and high HR could be secondary to GI losses with ongoing diarrhea  Plan:  - Monitor fever curve  - Follow up blood cultures  - Continue IV Zosyn  - Continue prior to admission Mucomyst/albuterol nebulizer treatments, but change to q4h around the clock instead of while awake if he indeed had aspiration event.  - Resume TF this evening  - ID consulted -> no indication for suppressive abx,   - CT Chest tomorrow if still spiking fevers today.   - Tylenol/ibuprofen available for fever     Diarrhea  - Ongoing loose stools likely secondary to antibiotics. No worrisome signs for c diff, will check enteric panel for completeness, but suspect will be negative.  Plan:  - IVF due to GI losses with hypotension/tachycardia.     Panhypopituitarism  Patient maintained on testosterone, hydrocortisone, levothyroxine.   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 Phylicia Stout of [facility]  - If continues to  have staring episodes outside of fevers, would consider EEG.     Perianal pressure injury with skin tear  Many previous admissions with wounds, high risk for wound development from pressure and immobility.   - Frequent repositioning     Skin breakdown on penis, improved  Noted on previous admission between border of shaft of penis and glans penis on the right. Suspect secondary to condom catheter.      Diet: Adult Formula Drip Feeding: Continuous Isosource 1.5; Jejunostomy; Goal Rate: 100 mL/hr x 12 hrs; mL/hr; From: 7:00 AM; 7:00 PM; Medication - Feeding Tube Flush Frequency: At least 15-30 mL water before and after medication administration and with...    DVT Prophylaxis: Pneumatic Compression Devices  Lerma Catheter: not present  Code Status: Full Code      Disposition Plan   Expected discharge: 2 - 3 days, recommended to prior living arrangement once antibiotic plan established.  Entered: Ricky Torres MD 12/29/2019, 12:45 PM       The patient's care was discussed with the Bedside Nurse and Patient.    Ricky Torres MD  Hospitalist Service  [facility]    ______________________________________________________________________    Interval History     No complaints, non-verbal  Appears comfortable.    Data reviewed today: I reviewed all medications, new labs and imaging results over the last 24 hours. I personally reviewed no images or EKG's today.    Physical Exam   Vital Signs: Temp: 98.4  F (36.9  C) Temp src: Axillary BP: 102/54 Pulse: 72 Heart Rate: 89 Resp: 18 SpO2: 96 % O2 Device: None (Room air) Oxygen Delivery: 2 LPM  Weight: 166 lbs 12.8 oz    Constitutional: Awake, alert, cooperative, smiling  Respiratory: Clear to auscultation bilaterally, no crackles or wheezing. Dry cough (heard once during my 20 minutes in room)  Cardiovascular: Tachycardic but regular rhythm, normal S1 and S2, and no murmur noted.  GI: Soft, non-distended, non-tender, normal bowel sounds.  Musculoskeletal: No joint  swelling, erythema or tenderness. Chronic contractures.  Neurologic: Cranial nerves 2-12 intact, normal strength and sensation.  Psychiatric: Alert, says hello, at his baseline mental status.    Data   Recent Labs   Lab 12/29/19  0628 12/28/19  1328 12/27/19  1930  12/23/19  1114   WBC 10.1 14.7* 7.7   < > 11.0   HGB 9.7* 10.2* 12.5*   < > 14.2   MCV 83 83 83   < > 80   * 119* 154   < > 247    142 136   < > 138   POTASSIUM 3.6 4.0 3.9   < > 3.8   CHLORIDE 111* 113* 102   < > 106   CO2 26 25 29   < > 27   BUN 13 13 19   < > 23   CR 0.90 0.83 0.81   < > 0.88   ANIONGAP 3 4 5   < > 5   STEVE 7.9* 7.6* 8.4*   < > 8.8   * 99 129*   < > 106*   ALBUMIN  --   --  3.1*  --  3.4   PROTTOTAL  --   --  7.9  --  8.4   BILITOTAL  --   --  0.2  --  0.2   ALKPHOS  --   --  95  --  109   ALT  --   --  36  --  35   AST  --   --  35  --  26    < > = values in this interval not displayed.     No results found for this or any previous visit (from the past 24 hour(s)).  Medications       acetylcysteine  2 mL Nebulization 4x Daily     albuterol  2.5 mg Nebulization 4x Daily     aspirin  81 mg Oral or Feeding Tube Daily     Brivaracetam  100 mg Oral or Feeding Tube BID     carBAMazepine  150 mg Oral or Feeding Tube Q6H     ferrous sulfate  220 mg Per Feeding Tube Daily     fiber modular (NUTRISOURCE FIBER)  1 packet Per Feeding Tube Daily     hydrocortisone  10 mg Per G Tube Daily with supper     hydrocortisone  20 mg Per G Tube QAM     levothyroxine  150 mcg Oral QAM AC     melatonin  6 mg Per NG tube At Bedtime     metoclopramide  5 mg Per Feeding Tube BID     pantoprazole  40 mg Per J Tube Daily     piperacillin-tazobactam  3.375 g Intravenous Q6H     potassium & sodium phosphates  1 packet Oral BID     Scopolamine HBr  0.8 mg Gastric Tube TID     sodium bicarbonate  650 mg Oral Daily     sodium chloride (PF)  3 mL Intracatheter Q8H

## 2019-12-29 NOTE — PLAN OF CARE
Pt Alert, non-verbal. Temp of 102.1 at 0130, tylenol and ibuprofen given. Temp now down to 98.3. Incontinent of B/B, turn and repo q 2 hrs. Wound on coccyx, foam covering.Tube feeding stopped @0100. BP 85/40 at 0600, MD notified, bolus initiated. Continue to monitor.

## 2019-12-29 NOTE — PLAN OF CARE
Day Shift [id number]  Mental Status:non-verbal, orientation HECTOR  Activity/dangle:Total care. lift/ turn and repo  Diet:NPO; tube feeding started at 0815, MD paged x2. Residual 0   Pain:HECTOR  Lerma/Voiding:incontinent of B&B.  Tele/Restraints/Iso:Tele: SR  02/LDA:RADHA- prn nebs  D/C Date:tbd  Other Info:On IV abx.

## 2019-12-29 NOTE — PROGRESS NOTES
Cross Cover:  Notified that BP was 85/43. No longer on continuous IVF. Did have episode of emesis overnight.  Give 500ml IVF bolus now. Monitor BPs.    JOHN Claros,

## 2019-12-30 LAB
ANION GAP SERPL CALCULATED.3IONS-SCNC: 6 MMOL/L (ref 3–14)
BUN SERPL-MCNC: 11 MG/DL (ref 7–30)
CALCIUM SERPL-MCNC: 8.3 MG/DL (ref 8.5–10.1)
CHLORIDE SERPL-SCNC: 104 MMOL/L (ref 94–109)
CO2 SERPL-SCNC: 25 MMOL/L (ref 20–32)
CREAT SERPL-MCNC: 0.91 MG/DL (ref 0.66–1.25)
GFR SERPL CREATININE-BSD FRML MDRD: >90 ML/MIN/{1.73_M2}
GLUCOSE SERPL-MCNC: 87 MG/DL (ref 70–99)
MAGNESIUM SERPL-MCNC: 2.2 MG/DL (ref 1.6–2.3)
PHOSPHATE SERPL-MCNC: 2.3 MG/DL (ref 2.5–4.5)
POTASSIUM SERPL-SCNC: 3.7 MMOL/L (ref 3.4–5.3)
SODIUM SERPL-SCNC: 135 MMOL/L (ref 133–144)

## 2019-12-30 PROCEDURE — 94640 AIRWAY INHALATION TREATMENT: CPT

## 2019-12-30 PROCEDURE — 25000132 ZZH RX MED GY IP 250 OP 250 PS 637: Mod: GY | Performed by: HOSPITALIST

## 2019-12-30 PROCEDURE — 80048 BASIC METABOLIC PNL TOTAL CA: CPT | Performed by: HOSPITALIST

## 2019-12-30 PROCEDURE — 83735 ASSAY OF MAGNESIUM: CPT | Performed by: HOSPITALIST

## 2019-12-30 PROCEDURE — 99232 SBSQ HOSP IP/OBS MODERATE 35: CPT | Performed by: STUDENT IN AN ORGANIZED HEALTH CARE EDUCATION/TRAINING PROGRAM

## 2019-12-30 PROCEDURE — 40000275 ZZH STATISTIC RCP TIME EA 10 MIN

## 2019-12-30 PROCEDURE — 84100 ASSAY OF PHOSPHORUS: CPT | Performed by: HOSPITALIST

## 2019-12-30 PROCEDURE — 36415 COLL VENOUS BLD VENIPUNCTURE: CPT | Performed by: HOSPITALIST

## 2019-12-30 PROCEDURE — 12000000 ZZH R&B MED SURG/OB

## 2019-12-30 PROCEDURE — 94640 AIRWAY INHALATION TREATMENT: CPT | Mod: 76

## 2019-12-30 PROCEDURE — 25000128 H RX IP 250 OP 636: Performed by: HOSPITALIST

## 2019-12-30 PROCEDURE — 25000125 ZZHC RX 250: Performed by: HOSPITALIST

## 2019-12-30 RX ADMIN — CARBAMAZEPINE 150 MG: 100 SUSPENSION ORAL at 15:20

## 2019-12-30 RX ADMIN — HYDROCORTISONE 10 MG: 10 TABLET ORAL at 19:21

## 2019-12-30 RX ADMIN — PIPERACILLIN AND TAZOBACTAM 3.38 G: 3; .375 INJECTION, POWDER, FOR SOLUTION INTRAVENOUS at 20:15

## 2019-12-30 RX ADMIN — ASPIRIN 81 MG 81 MG: 81 TABLET ORAL at 08:55

## 2019-12-30 RX ADMIN — Medication 0.8 MG: at 16:23

## 2019-12-30 RX ADMIN — POTASSIUM & SODIUM PHOSPHATES POWDER PACK 280-160-250 MG 1 PACKET: 280-160-250 PACK at 08:55

## 2019-12-30 RX ADMIN — PIPERACILLIN AND TAZOBACTAM 3.38 G: 3; .375 INJECTION, POWDER, FOR SOLUTION INTRAVENOUS at 15:20

## 2019-12-30 RX ADMIN — BRIVARACETAM 100 MG: 10 SOLUTION ORAL at 22:37

## 2019-12-30 RX ADMIN — SODIUM BICARBONATE 650 MG TABLET 650 MG: at 08:55

## 2019-12-30 RX ADMIN — METOCLOPRAMIDE 5 MG: 5 SOLUTION ORAL at 20:16

## 2019-12-30 RX ADMIN — CARBAMAZEPINE 150 MG: 100 SUSPENSION ORAL at 08:56

## 2019-12-30 RX ADMIN — CARBAMAZEPINE 150 MG: 100 SUSPENSION ORAL at 01:02

## 2019-12-30 RX ADMIN — ACETYLCYSTEINE 4 ML: 200 SOLUTION ORAL; RESPIRATORY (INHALATION) at 16:29

## 2019-12-30 RX ADMIN — ACETYLCYSTEINE: 200 SOLUTION ORAL; RESPIRATORY (INHALATION) at 07:11

## 2019-12-30 RX ADMIN — LEVOTHYROXINE SODIUM 150 MCG: 75 TABLET ORAL at 08:54

## 2019-12-30 RX ADMIN — HYDROCORTISONE 20 MG: 20 TABLET ORAL at 08:55

## 2019-12-30 RX ADMIN — Medication 0.8 MG: at 22:38

## 2019-12-30 RX ADMIN — Medication 0.8 MG: at 08:56

## 2019-12-30 RX ADMIN — ACETAMINOPHEN 650 MG: 325 TABLET, FILM COATED ORAL at 18:12

## 2019-12-30 RX ADMIN — ACETAMINOPHEN 650 MG: 325 TABLET, FILM COATED ORAL at 06:57

## 2019-12-30 RX ADMIN — BRIVARACETAM 100 MG: 10 SOLUTION ORAL at 08:56

## 2019-12-30 RX ADMIN — MELATONIN 6 MG: 3 TAB ORAL at 22:40

## 2019-12-30 RX ADMIN — ACETYLCYSTEINE: 200 SOLUTION ORAL; RESPIRATORY (INHALATION) at 11:20

## 2019-12-30 RX ADMIN — METOCLOPRAMIDE 5 MG: 5 SOLUTION ORAL at 08:56

## 2019-12-30 RX ADMIN — ALBUTEROL SULFATE 2.5 MG: 2.5 SOLUTION RESPIRATORY (INHALATION) at 11:20

## 2019-12-30 RX ADMIN — CARBAMAZEPINE 150 MG: 100 SUSPENSION ORAL at 20:15

## 2019-12-30 RX ADMIN — PIPERACILLIN AND TAZOBACTAM 3.38 G: 3; .375 INJECTION, POWDER, FOR SOLUTION INTRAVENOUS at 01:02

## 2019-12-30 RX ADMIN — Medication 40 MG: at 08:55

## 2019-12-30 RX ADMIN — ALBUTEROL SULFATE 2.5 MG: 2.5 SOLUTION RESPIRATORY (INHALATION) at 20:04

## 2019-12-30 RX ADMIN — Medication 1 PACKET: at 09:15

## 2019-12-30 RX ADMIN — ALBUTEROL SULFATE 2.5 MG: 2.5 SOLUTION RESPIRATORY (INHALATION) at 16:25

## 2019-12-30 RX ADMIN — POTASSIUM & SODIUM PHOSPHATES POWDER PACK 280-160-250 MG 1 PACKET: 280-160-250 PACK at 16:21

## 2019-12-30 RX ADMIN — MINERAL SUPPLEMENT IRON 300 MG / 5 ML STRENGTH LIQUID 100 PER BOX UNFLAVORED 220 MG: at 08:55

## 2019-12-30 RX ADMIN — ACETAMINOPHEN 650 MG: 325 TABLET, FILM COATED ORAL at 01:28

## 2019-12-30 RX ADMIN — ALBUTEROL SULFATE 2.5 MG: 2.5 SOLUTION RESPIRATORY (INHALATION) at 07:13

## 2019-12-30 RX ADMIN — PIPERACILLIN AND TAZOBACTAM 3.38 G: 3; .375 INJECTION, POWDER, FOR SOLUTION INTRAVENOUS at 06:30

## 2019-12-30 ASSESSMENT — MIFFLIN-ST. JEOR: SCORE: 1638.2

## 2019-12-30 ASSESSMENT — ACTIVITIES OF DAILY LIVING (ADL)
ADLS_ACUITY_SCORE: 34
ADLS_ACUITY_SCORE: 38

## 2019-12-30 NOTE — PLAN OF CARE
Discharge Planner SLP   Patient plan for discharge: Not stated.   Current status: Acknowledged SLP order.  Patient well known to SLP team with Guardian involved in care planning on previous admissions. Patient with established severe dysphagia hx and high risk for aspiration. Patient's mother previously stated (2 weeks ago) that pt does not eat or drink anything, not even for pleasure. Patient with high risk of aspiration remaining, particularly increased risk during episodes of vomiting.  No formal SLP evaluation indicated at this time, but rather recommend continuation of frequent oral cares to reduce oral bacteria and prompt positioning accommodation if patient begins to vomit with consideration for respiratory therapy involvement following any vomiting events.   Barriers to return to prior living situation: Acute illness  Recommendations for discharge: Previous setting per SLP needs.  Rationale for recommendations: No SLP intervention indicated at this time.        Entered by: Iesha Montes 12/30/2019 3:19 PM

## 2019-12-30 NOTE — PLAN OF CARE
"Pt non-verbal, can respond with \"yes\" \"no\". HECTOR orientation. Currently on RA, pulling at nasal cannula. Turn reposition q2hr. Mepilex on coccyx. Incontinent.  "

## 2019-12-30 NOTE — TELEPHONE ENCOUNTER
sodium bicarbonate 650 MG tablet    Last Written Prescription Date:  12/28/2019  Last Fill Quantity: 650 mg,  # refills: unknown   Last office visit: No previous visit found with prescribing provider:     Future Office Visit:  Unknown     Requested Prescriptions   Pending Prescriptions Disp Refills     sodium bicarbonate 650 MG tablet 30 tablet 0     Sig: Take 1 tablet (650 mg) by mouth daily       There is no refill protocol information for this order

## 2019-12-30 NOTE — PLAN OF CARE
Pt non-verbal, HECTOR orientation. Temp of 99.5, prn tylenol given. On 2-3L NC overnight. Tube feeding running from 1132-1776. Turn & repo Q2 hr. Incontinent. Wound to coccyx, mepilex applied. Receiving IV abx and scheduled nebs. Will continue to monitor.

## 2019-12-30 NOTE — PROGRESS NOTES
[facility]    Medicine Progress Note - Hospitalist Service       Date of Admission:  12/27/2019  Date of Service: 12/30/2019    Assessment & Plan      Recurrent fever  Sepsis  Recurrent Aspiration PNA  21st admission this year. Last admit was 12/23-25 for fever with suspected pneumonia (HCAP vs aspiration) due to bibasilar atelectasis seen on CXR. He was given zosyn in hospital, then transitioned to Augmentin for 10 days on discharge and continued on this until his presentation today, 12/27. CXR does not look significantly changed from prior. Tmax 101 at home. SBP 90s on admit with HR in 120s. Had episode of vomiting at home. WBC 7.7, Lactic acid 2.7 (improved to 2 after IVF), procalcitonin <0.05. Flu negative. UA without infection, no new symptoms. At this point, no new source for infection. Has been on antibiotics since 12/16 and continues to have fevers. Met sepsis criteria on admission, but his low BP and high HR could be secondary to GI losses with ongoing diarrhea  Plan:  - Monitor fever curve  - Follow up blood cultures  - Continue IV Zosyn  - Continue prior to admission Mucomyst/albuterol nebulizer treatments, but change to q4h around the clock instead of while awake if he indeed had aspiration event.  - Resume TF with schedule of [id number], [id number], [id number]  - ID consulted -> no indication for suppressive abx,   - Tylenol/ibuprofen available for fever     Diarrhea  - Ongoing loose stools likely secondary to antibiotics. No worrisome signs for c diff, will check enteric panel for completeness, but suspect will be negative.  Plan:  - IVF due to GI losses with hypotension/tachycardia.     Panhypopituitarism  Patient maintained on testosterone, hydrocortisone, levothyroxine.   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 Phylicia Stout of [facility]     Perianal pressure injury with skin  tear  Many previous admissions with wounds, high risk for wound development from pressure and immobility.   - Frequent repositioning     Skin breakdown on penis, improved  Noted on previous admission between border of shaft of penis and glans penis on the right. Suspect secondary to condom catheter.      Diet: Adult Formula Drip Feeding: Continuous Isosource 1.5; Jejunostomy; Goal Rate: 90 mL/hr x 12 hrs; mL/hr; From: 8:00 AM; 8:00 PM; Medication - Feeding Tube Flush Frequency: At least 15-30 mL water before and after medication administration and with ...    DVT Prophylaxis: Pneumatic Compression Devices  Lerma Catheter: not present  Code Status: Full Code      Disposition Plan   Expected discharge: Tomorrow, recommended to prior living arrangement once antibiotic plan established.  Entered: Ricky Torres MD 12/30/2019, 2:38 PM       The patient's care was discussed with the Bedside Nurse and Patient.    Ricky Torres MD  Hospitalist Service  [facility]    ______________________________________________________________________    Interval History     No complaints, non-verbal  Appears comfortable.    Data reviewed today: I reviewed all medications, new labs and imaging results over the last 24 hours. I personally reviewed no images or EKG's today.    Physical Exam   Vital Signs: Temp: 99  F (37.2  C) Temp src: Axillary BP: 96/56   Heart Rate: 95 Resp: 18 SpO2: 95 % O2 Device: None (Room air) Oxygen Delivery: 3 LPM  Weight: 170 lbs 14.4 oz    Constitutional: Awake, alert, cooperative, smiling  Respiratory: Clear to auscultation bilaterally, no crackles or wheezing. Dry cough (heard once during my 20 minutes in room)  Cardiovascular: regular rate and regular rhythm, normal S1 and S2, and no murmur noted.  GI: Soft, non-distended, non-tender, normal bowel sounds.  Musculoskeletal: No joint swelling, erythema or tenderness. Chronic contractures.  Neurologic: Cranial nerves 2-12 intact, normal strength and  sensation.  Psychiatric: Alert, says hello, at his baseline mental status.    Data   Recent Labs   Lab 12/30/19  0707 12/29/19  0628 12/28/19  1328 12/27/19  1930   WBC  --  10.1 14.7* 7.7   HGB  --  9.7* 10.2* 12.5*   MCV  --  83 83 83   PLT  --  115* 119* 154    140 142 136   POTASSIUM 3.7 3.6 4.0 3.9   CHLORIDE 104 111* 113* 102   CO2 25 26 25 29   BUN 11 13 13 19   CR 0.91 0.90 0.83 0.81   ANIONGAP 6 3 4 5   STEVE 8.3* 7.9* 7.6* 8.4*   GLC 87 115* 99 129*   ALBUMIN  --   --   --  3.1*   PROTTOTAL  --   --   --  7.9   BILITOTAL  --   --   --  0.2   ALKPHOS  --   --   --  95   ALT  --   --   --  36   AST  --   --   --  35     No results found for this or any previous visit (from the past 24 hour(s)).  Medications       acetylcysteine  2 mL Nebulization 4x Daily     albuterol  2.5 mg Nebulization 4x Daily     aspirin  81 mg Oral or Feeding Tube Daily     Brivaracetam  100 mg Oral or Feeding Tube BID     carBAMazepine  150 mg Oral or Feeding Tube Q6H     ferrous sulfate  220 mg Per Feeding Tube Daily     fiber modular (NUTRISOURCE FIBER)  1 packet Per Feeding Tube Daily     hydrocortisone  10 mg Per G Tube Daily with supper     hydrocortisone  20 mg Per G Tube QAM     levothyroxine  150 mcg Oral QAM AC     melatonin  6 mg Per NG tube At Bedtime     metoclopramide  5 mg Per Feeding Tube BID     pantoprazole  40 mg Per J Tube Daily     piperacillin-tazobactam  3.375 g Intravenous Q6H     potassium & sodium phosphates  1 packet Oral BID     Scopolamine HBr  0.8 mg Gastric Tube TID     sodium bicarbonate  650 mg Oral Daily     sodium chloride (PF)  3 mL Intracatheter Q8H

## 2019-12-30 NOTE — PROGRESS NOTES
[facility]  Infectious Disease Progress Note          Assessment and Plan:   IMPRESSION:   1.  A 59-year-old male, very well known to our group, admitted for a readmission after numerous recent admissions for acute fever, almost certainly further aspiration pneumonia.  Well documented history of this and numerous prior episodes similar to the current episode.   2.  Prior history of numerous aspiration pneumonia, sepsis and fever events, has had chronic Pseudomonas colonization which, of note, has at times historically been piperacillin resistant, also has other known resistant pathogens, both MRSA and VRE.  Of great note, a number of his episodes historically have been clear chemical aspiration without an obvious infection component.  Current episode may well be in that same group.   3.  Traumatic brain injury, chronic aphasia and spastic paralysis.   4.  History of seizure disorder.   5.  MRSA and VRE colonization.      RECOMMENDATIONS:   1.  Currently on Zosyn, continue for now. No pseudomonas isolated in 2 yrs, historically pip R so if not better maybe among other things katie, but also historically often simply gets better  Rapidly so watch for now  2.  Prophylactic antibiotics for aspiration pneumonia are generally  not indicated, almost contraindicated due to the promotion of resistance and lack of effectiveness.  In his case, with substantial resistance risk as well as numerous episodes that have been chemical rather than bacterial pneumonitis, would not entertain any thought of chronic suppressive antibiotics.   3.  Overall situation is extremely difficult, no good answer to this problem, already doing maximum aspiration prevention interventions.   4.  Recurrent episode.  Follow clinically, including fever, await cultures and readjust. As always if not better CT to saud for empyema/abscess etc             Interval History:   no new complaints loks same cxs neg               Medications:        acetylcysteine  2 mL Nebulization 4x Daily     albuterol  2.5 mg Nebulization 4x Daily     aspirin  81 mg Oral or Feeding Tube Daily     Brivaracetam  100 mg Oral or Feeding Tube BID     carBAMazepine  150 mg Oral or Feeding Tube Q6H     ferrous sulfate  220 mg Per Feeding Tube Daily     fiber modular (NUTRISOURCE FIBER)  1 packet Per Feeding Tube Daily     hydrocortisone  10 mg Per G Tube Daily with supper     hydrocortisone  20 mg Per G Tube QAM     levothyroxine  150 mcg Oral QAM AC     melatonin  6 mg Per NG tube At Bedtime     metoclopramide  5 mg Per Feeding Tube BID     pantoprazole  40 mg Per J Tube Daily     piperacillin-tazobactam  3.375 g Intravenous Q6H     potassium & sodium phosphates  1 packet Oral BID     Scopolamine HBr  0.8 mg Gastric Tube TID     sodium bicarbonate  650 mg Oral Daily     sodium chloride (PF)  3 mL Intracatheter Q8H                  Physical Exam:   Blood pressure 96/56, pulse 72, temperature 99  F (37.2  C), temperature source Axillary, resp. rate 18, height 1.829 m (6'), weight 77.5 kg (170 lb 14.4 oz), SpO2 95 %.  Wt Readings from Last 2 Encounters:   12/30/19 77.5 kg (170 lb 14.4 oz)   12/25/19 75.1 kg (165 lb 9.1 oz)     Vital Signs with Ranges  Temp:  [98.4  F (36.9  C)-99.7  F (37.6  C)] 99  F (37.2  C)  Heart Rate:  [83-97] 95  Resp:  [16-18] 18  BP: ()/(45-66) 96/56  SpO2:  [93 %-97 %] 95 %    Constitutional: Awake, alert, neuro baseline   Lungs: Congestion to auscultation bilaterally, no crackles or wheezing   Cardiovascular: Regular rate and rhythm, normal S1 and S2, and no murmur noted   Abdomen: Normal bowel sounds, soft, non-distended, non-tender   Skin: No rashes, no cyanosis, no edema neuro same   Other:           Data:   All microbiology laboratory data reviewed.  Recent Labs   Lab Test 12/29/19  0628 12/28/19  1328 12/27/19  1930   WBC 10.1 14.7* 7.7   HGB 9.7* 10.2* 12.5*   HCT 29.9* 31.9* 39.0*   MCV 83 83 83   * 119* 154     Recent Labs   Lab  Test 12/30/19  0707 12/29/19  0628 12/28/19  1328   CR 0.91 0.90 0.83     No lab results found.  Recent Labs   Lab Test 12/27/19 1957 12/27/19  1931 12/27/19  1930 12/23/19  1247 12/23/19  1123 12/23/19  1114 12/16/19  2320 12/16/19  1722 12/16/19  1717   CULT No growth after 3 days No growth after 3 days No growth No growth No growth No growth >100,000 colonies/mL  mixed urogenital jaime  Susceptibility testing not routinely done   No growth No growth

## 2019-12-31 LAB
ANION GAP SERPL CALCULATED.3IONS-SCNC: 7 MMOL/L (ref 3–14)
BUN SERPL-MCNC: 13 MG/DL (ref 7–30)
CALCIUM SERPL-MCNC: 8.1 MG/DL (ref 8.5–10.1)
CHLORIDE SERPL-SCNC: 103 MMOL/L (ref 94–109)
CO2 SERPL-SCNC: 23 MMOL/L (ref 20–32)
CREAT SERPL-MCNC: 0.88 MG/DL (ref 0.66–1.25)
ERYTHROCYTE [DISTWIDTH] IN BLOOD BY AUTOMATED COUNT: 17.4 % (ref 10–15)
GFR SERPL CREATININE-BSD FRML MDRD: >90 ML/MIN/{1.73_M2}
GLUCOSE SERPL-MCNC: 87 MG/DL (ref 70–99)
HCT VFR BLD AUTO: 31.4 % (ref 40–53)
HGB BLD-MCNC: 10.4 G/DL (ref 13.3–17.7)
MCH RBC QN AUTO: 26.7 PG (ref 26.5–33)
MCHC RBC AUTO-ENTMCNC: 33.1 G/DL (ref 31.5–36.5)
MCV RBC AUTO: 81 FL (ref 78–100)
PLATELET # BLD AUTO: 171 10E9/L (ref 150–450)
POTASSIUM SERPL-SCNC: 4.1 MMOL/L (ref 3.4–5.3)
RBC # BLD AUTO: 3.9 10E12/L (ref 4.4–5.9)
SODIUM SERPL-SCNC: 133 MMOL/L (ref 133–144)
WBC # BLD AUTO: 6.7 10E9/L (ref 4–11)

## 2019-12-31 PROCEDURE — 40000275 ZZH STATISTIC RCP TIME EA 10 MIN

## 2019-12-31 PROCEDURE — 99207 ZZC MOONLIGHTING INDICATOR: CPT | Performed by: INTERNAL MEDICINE

## 2019-12-31 PROCEDURE — 12000000 ZZH R&B MED SURG/OB

## 2019-12-31 PROCEDURE — 36415 COLL VENOUS BLD VENIPUNCTURE: CPT | Performed by: STUDENT IN AN ORGANIZED HEALTH CARE EDUCATION/TRAINING PROGRAM

## 2019-12-31 PROCEDURE — 94640 AIRWAY INHALATION TREATMENT: CPT | Mod: 76

## 2019-12-31 PROCEDURE — 25000125 ZZHC RX 250: Performed by: HOSPITALIST

## 2019-12-31 PROCEDURE — 25000132 ZZH RX MED GY IP 250 OP 250 PS 637: Mod: GY | Performed by: HOSPITALIST

## 2019-12-31 PROCEDURE — 99232 SBSQ HOSP IP/OBS MODERATE 35: CPT | Performed by: INTERNAL MEDICINE

## 2019-12-31 PROCEDURE — 94640 AIRWAY INHALATION TREATMENT: CPT

## 2019-12-31 PROCEDURE — 25000128 H RX IP 250 OP 636: Performed by: HOSPITALIST

## 2019-12-31 PROCEDURE — 85027 COMPLETE CBC AUTOMATED: CPT | Performed by: STUDENT IN AN ORGANIZED HEALTH CARE EDUCATION/TRAINING PROGRAM

## 2019-12-31 PROCEDURE — 80048 BASIC METABOLIC PNL TOTAL CA: CPT | Performed by: STUDENT IN AN ORGANIZED HEALTH CARE EDUCATION/TRAINING PROGRAM

## 2019-12-31 RX ORDER — SODIUM BICARBONATE 650 MG/1
650 TABLET ORAL DAILY
Start: 2019-12-31

## 2019-12-31 RX ADMIN — BRIVARACETAM 100 MG: 10 SOLUTION ORAL at 21:32

## 2019-12-31 RX ADMIN — PIPERACILLIN AND TAZOBACTAM 3.38 G: 3; .375 INJECTION, POWDER, FOR SOLUTION INTRAVENOUS at 14:32

## 2019-12-31 RX ADMIN — ASPIRIN 81 MG 81 MG: 81 TABLET ORAL at 08:12

## 2019-12-31 RX ADMIN — PIPERACILLIN AND TAZOBACTAM 3.38 G: 3; .375 INJECTION, POWDER, FOR SOLUTION INTRAVENOUS at 03:21

## 2019-12-31 RX ADMIN — BRIVARACETAM 100 MG: 10 SOLUTION ORAL at 10:59

## 2019-12-31 RX ADMIN — HYDROCORTISONE 20 MG: 20 TABLET ORAL at 08:12

## 2019-12-31 RX ADMIN — METOCLOPRAMIDE 5 MG: 5 SOLUTION ORAL at 08:13

## 2019-12-31 RX ADMIN — ALBUTEROL SULFATE 2.5 MG: 2.5 SOLUTION RESPIRATORY (INHALATION) at 07:53

## 2019-12-31 RX ADMIN — PIPERACILLIN AND TAZOBACTAM 3.38 G: 3; .375 INJECTION, POWDER, FOR SOLUTION INTRAVENOUS at 08:11

## 2019-12-31 RX ADMIN — Medication 1 PACKET: at 08:13

## 2019-12-31 RX ADMIN — POTASSIUM & SODIUM PHOSPHATES POWDER PACK 280-160-250 MG 1 PACKET: 280-160-250 PACK at 16:52

## 2019-12-31 RX ADMIN — ACETYLCYSTEINE 2 ML: 200 SOLUTION ORAL; RESPIRATORY (INHALATION) at 11:46

## 2019-12-31 RX ADMIN — CARBAMAZEPINE 150 MG: 100 SUSPENSION ORAL at 14:32

## 2019-12-31 RX ADMIN — SODIUM BICARBONATE 650 MG TABLET 650 MG: at 08:12

## 2019-12-31 RX ADMIN — CARBAMAZEPINE 150 MG: 100 SUSPENSION ORAL at 08:13

## 2019-12-31 RX ADMIN — Medication 0.8 MG: at 17:15

## 2019-12-31 RX ADMIN — Medication 40 MG: at 08:13

## 2019-12-31 RX ADMIN — Medication 0.8 MG: at 21:31

## 2019-12-31 RX ADMIN — HYDROCORTISONE 10 MG: 10 TABLET ORAL at 16:54

## 2019-12-31 RX ADMIN — POTASSIUM & SODIUM PHOSPHATES POWDER PACK 280-160-250 MG 1 PACKET: 280-160-250 PACK at 08:12

## 2019-12-31 RX ADMIN — ALBUTEROL SULFATE 2.5 MG: 2.5 SOLUTION RESPIRATORY (INHALATION) at 11:46

## 2019-12-31 RX ADMIN — ACETYLCYSTEINE 4 ML: 200 SOLUTION ORAL; RESPIRATORY (INHALATION) at 19:04

## 2019-12-31 RX ADMIN — LEVOTHYROXINE SODIUM 150 MCG: 75 TABLET ORAL at 06:40

## 2019-12-31 RX ADMIN — PIPERACILLIN AND TAZOBACTAM 3.38 G: 3; .375 INJECTION, POWDER, FOR SOLUTION INTRAVENOUS at 21:30

## 2019-12-31 RX ADMIN — MINERAL SUPPLEMENT IRON 300 MG / 5 ML STRENGTH LIQUID 100 PER BOX UNFLAVORED 220 MG: at 08:11

## 2019-12-31 RX ADMIN — CARBAMAZEPINE 150 MG: 100 SUSPENSION ORAL at 21:32

## 2019-12-31 RX ADMIN — CARBAMAZEPINE 150 MG: 100 SUSPENSION ORAL at 03:21

## 2019-12-31 RX ADMIN — ACETYLCYSTEINE 2 ML: 200 SOLUTION ORAL; RESPIRATORY (INHALATION) at 07:53

## 2019-12-31 RX ADMIN — METOCLOPRAMIDE 5 MG: 5 SOLUTION ORAL at 21:32

## 2019-12-31 RX ADMIN — Medication 0.8 MG: at 08:13

## 2019-12-31 RX ADMIN — ALBUTEROL SULFATE 2.5 MG: 2.5 SOLUTION RESPIRATORY (INHALATION) at 19:03

## 2019-12-31 RX ADMIN — MELATONIN 6 MG: 3 TAB ORAL at 21:31

## 2019-12-31 ASSESSMENT — ACTIVITIES OF DAILY LIVING (ADL)
ADLS_ACUITY_SCORE: 36
ADLS_ACUITY_SCORE: 36
ADLS_ACUITY_SCORE: 34
ADLS_ACUITY_SCORE: 36

## 2019-12-31 NOTE — PLAN OF CARE
Discharge Planner OT   Patient plan for discharge: Unknown  Current status: Orders rec'd, chart reviewed. Per chart review from previous hospital admission, pt is lift dependent at baseline and total cares. No skilled IP OT needs identified, will complete orders at this time.   Barriers to return to prior living situation: Defer to medical team  Recommendations for discharge: Defer to medical team  Rationale for recommendations: Pt lift dependent and total cares at baseline. No skilled IP PT needs identified, will complete orders at this time.        Entered by: Shea Umaña 12/31/2019 12:56 PM

## 2019-12-31 NOTE — PLAN OF CARE
Pt nonverbal. VSS on RA. Turn and repo q2. Incontinent of B/B, no BM this shift. Tube feeding stopped at 2000. To be restarted at 0800 this morning. Continue to monitor.

## 2019-12-31 NOTE — PROGRESS NOTES
[facility]  Infectious Disease Progress Note          Assessment and Plan:   IMPRESSION:   1.  A 59-year-old male, very well known to our group, admitted for a readmission after numerous recent admissions for acute fever, almost certainly further aspiration pneumonia.  Well documented history of this and numerous prior episodes similar to the current episode.   2.  Prior history of numerous aspiration pneumonia, sepsis and fever events, has had chronic Pseudomonas colonization which, of note, has at times historically been piperacillin resistant, also has other known resistant pathogens, both MRSA and VRE.  Of great note, a number of his episodes historically have been clear chemical aspiration without an obvious infection component.  Current episode may well be in that same group.   3.  Traumatic brain injury, chronic aphasia and spastic paralysis.   4.  History of seizure disorder.   5.  MRSA and VRE colonization.      RECOMMENDATIONS:   1.  Currently on Zosyn, continue for now. No pseudomonas isolated in 2 yrs, historically pip R so if not better maybe among other things katie, but also historically often simply gets better  Rapidly so watch for now  2.  Prophylactic antibiotics for aspiration pneumonia are generally  not indicated, almost contraindicated due to the promotion of resistance and lack of effectiveness.  In his case, with substantial resistance risk as well as numerous episodes that have been chemical rather than bacterial pneumonitis, would not entertain any thought of chronic suppressive antibiotics.   3.  Overall situation is extremely difficult, no good answer to this problem, already doing maximum aspiration prevention interventions.   4.  Recurrent episode.  Follow clinically, including fever, await cultures and readjust. As always if not better CT to saud for empyema/abscess etc             Interval History:   no new complaints loks same cxs neg               Medications:        acetylcysteine  2 mL Nebulization 4x Daily     albuterol  2.5 mg Nebulization 4x Daily     aspirin  81 mg Oral or Feeding Tube Daily     Brivaracetam  100 mg Oral or Feeding Tube BID     carBAMazepine  150 mg Oral or Feeding Tube Q6H     ferrous sulfate  220 mg Per Feeding Tube Daily     fiber modular (NUTRISOURCE FIBER)  1 packet Per Feeding Tube Daily     hydrocortisone  10 mg Per G Tube Daily with supper     hydrocortisone  20 mg Per G Tube QAM     levothyroxine  150 mcg Oral QAM AC     melatonin  6 mg Per NG tube At Bedtime     metoclopramide  5 mg Per Feeding Tube BID     pantoprazole  40 mg Per J Tube Daily     piperacillin-tazobactam  3.375 g Intravenous Q6H     potassium & sodium phosphates  1 packet Oral BID     Scopolamine HBr  0.8 mg Gastric Tube TID     sodium bicarbonate  650 mg Oral Daily     sodium chloride (PF)  3 mL Intracatheter Q8H                  Physical Exam:   Blood pressure 109/65, pulse 76, temperature 98.5  F (36.9  C), temperature source Oral, resp. rate 18, height 1.829 m (6'), weight 77.5 kg (170 lb 14.4 oz), SpO2 92 %.  Wt Readings from Last 2 Encounters:   12/30/19 77.5 kg (170 lb 14.4 oz)   12/25/19 75.1 kg (165 lb 9.1 oz)     Vital Signs with Ranges  Temp:  [97.6  F (36.4  C)-100.3  F (37.9  C)] 98.5  F (36.9  C)  Pulse:  [76-86] 76  Heart Rate:  [73-90] 78  Resp:  [16-20] 18  BP: (106-125)/(56-71) 109/65  SpO2:  [91 %-96 %] 92 %    Constitutional: Awake, alert, neuro baseline   Lungs: Congestion to auscultation bilaterally, no crackles or wheezing   Cardiovascular: Regular rate and rhythm, normal S1 and S2, and no murmur noted   Abdomen: Normal bowel sounds, soft, non-distended, non-tender   Skin: No rashes, no cyanosis, no edema neuro same   Other:           Data:   All microbiology laboratory data reviewed.  Recent Labs   Lab Test 12/29/19  0628 12/28/19  1328 12/27/19  1930   WBC 10.1 14.7* 7.7   HGB 9.7* 10.2* 12.5*   HCT 29.9* 31.9* 39.0*   MCV 83 83 83   * 119*  154     Recent Labs   Lab Test 12/30/19  0707 12/29/19  0628 12/28/19  1328   CR 0.91 0.90 0.83     No lab results found.  Recent Labs   Lab Test 12/27/19  1957 12/27/19  1931 12/27/19  1930 12/23/19  1247 12/23/19  1123 12/23/19  1114 12/16/19  2320 12/16/19  1722 12/16/19  1717   CULT No growth after 4 days No growth after 4 days No growth No growth No growth No growth >100,000 colonies/mL  mixed urogenital jaime  Susceptibility testing not routinely done   No growth No growth

## 2019-12-31 NOTE — PLAN OF CARE
Pt is A & O to self, nonverbal. Lungs sound diminished, infrequent none productive cough, bowel sounds active, cms intact. Repositioned. Incontinent of bladder. Intermittent TF during the day. Tmax was 100.2, tylenol given, temp recheck was 98.8. Neb treatments given. Will continue to monitor.

## 2019-12-31 NOTE — PROGRESS NOTES
[facility]    Medicine Progress Note - Hospitalist Service       Date of Admission:  12/27/2019  Date of Service: 12/31/2019    Assessment & Plan      Recurrent fever  Sepsis  Recurrent Aspiration PNA  21st admission this year. Last admit was 12/23-25 for fever with suspected pneumonia (HCAP vs aspiration) due to bibasilar atelectasis seen on CXR. He was given zosyn in hospital, then transitioned to Augmentin for 10 days on discharge and continued on this until his presentation today, 12/27. CXR does not look significantly changed from prior. Tmax 101 at home. SBP 90s on admit with HR in 120s. Had episode of vomiting at home. WBC 7.7, Lactic acid 2.7 (improved to 2 after IVF), procalcitonin <0.05. Flu negative. UA without infection, no new symptoms. At this point, no new source for infection. Has been on antibiotics since 12/16 and continues to have fevers. Met sepsis criteria on admission, but his low BP and high HR could be secondary to GI losses with ongoing diarrhea  Plan:  - Monitor fever curve  - Follow up blood cultures  - Continue IV Zosyn  - Continue prior to admission Mucomyst/albuterol nebulizer treatments, but change to q4h around the clock instead of while awake if he indeed had aspiration event.  - Resume TF with schedule of [id number], [id number], [id number]  - ID consulted -> no indication for suppressive abx. If can transition to oral antibiotcs per ID, will discharge today or tommorrow.   - Tylenol/ibuprofen available for fever     Diarrhea  - Ongoing loose stools likely secondary to antibiotics. No worrisome signs for c diff,enteric panel negative.   Plan:  - IVF due to GI losses with hypotension/tachycardia.     Panhypopituitarism  Patient maintained on testosterone, hydrocortisone, levothyroxine.   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 Phylicia Stout of [facility]  neu[facility]     Perianal pressure injury with skin tear  Many previous admissions with wounds, high risk for wound development from pressure and immobility.   - Frequent repositioning     Skin breakdown on penis, improved  Noted on previous admission between border of shaft of penis and glans penis on the right. Suspect secondary to condom catheter.      Diet: Adult Formula Drip Feeding: Continuous Isosource 1.5; Jejunostomy; Goal Rate: 90 mL/hr x 12 hrs; mL/hr; From: 8:00 AM; 8:00 PM; Medication - Feeding Tube Flush Frequency: At least 15-30 mL water before and after medication administration and with ...    DVT Prophylaxis: Pneumatic Compression Devices  Lerma Catheter: not present  Code Status: Full Code      Disposition Plan   Expected discharge: Today or Tomorrow, recommended to prior living arrangement once antibiotic plan established.  Entered: Jorge Mars MD 12/31/2019, 10:17 AM       The patient's care was discussed with the Bedside Nurse and Patient.    Jorge Mars MD  Hospitalist Service  [facility]    _____________________________________________________________________    Interval History   No complaints, non-verbal. Appears comfortable. PT/OT, SW consulted.     Data reviewed today: I reviewed all medications, new labs and imaging results over the last 24 hours. I personally reviewed no images or EKG's today.    Physical Exam   Vital Signs: Temp: 98.5  F (36.9  C) Temp src: Oral BP: 109/65 Pulse: 76 Heart Rate: 78 Resp: 18 SpO2: 92 % O2 Device: None (Room air) Oxygen Delivery: 3 LPM  Weight: 170 lbs 14.4 oz    Constitutional: Awake, alert, cooperative, smiling  Respiratory: Clear to auscultation bilaterally, no crackles or wheezing. Dry cough (heard once during my 20 minutes in room)  Cardiovascular: regular rate and regular rhythm, normal S1 and S2, and no murmur noted.  GI: Soft, non-distended, non-tender, normal bowel sounds.  Musculoskeletal: No joint swelling, erythema or tenderness.  Chronic contractures.  Neurologic: Cranial nerves 2-12 intact, normal strength and sensation.  Psychiatric: Alert, says hello, at his baseline mental status.    Data   Recent Labs   Lab 12/30/19  0707 12/29/19  0628 12/28/19  1328 12/27/19  1930   WBC  --  10.1 14.7* 7.7   HGB  --  9.7* 10.2* 12.5*   MCV  --  83 83 83   PLT  --  115* 119* 154    140 142 136   POTASSIUM 3.7 3.6 4.0 3.9   CHLORIDE 104 111* 113* 102   CO2 25 26 25 29   BUN 11 13 13 19   CR 0.91 0.90 0.83 0.81   ANIONGAP 6 3 4 5   STEVE 8.3* 7.9* 7.6* 8.4*   GLC 87 115* 99 129*   ALBUMIN  --   --   --  3.1*   PROTTOTAL  --   --   --  7.9   BILITOTAL  --   --   --  0.2   ALKPHOS  --   --   --  95   ALT  --   --   --  36   AST  --   --   --  35     No results found for this or any previous visit (from the past 24 hour(s)).  Medications       acetylcysteine  2 mL Nebulization 4x Daily     albuterol  2.5 mg Nebulization 4x Daily     aspirin  81 mg Oral or Feeding Tube Daily     Brivaracetam  100 mg Oral or Feeding Tube BID     carBAMazepine  150 mg Oral or Feeding Tube Q6H     ferrous sulfate  220 mg Per Feeding Tube Daily     fiber modular (NUTRISOURCE FIBER)  1 packet Per Feeding Tube Daily     hydrocortisone  10 mg Per G Tube Daily with supper     hydrocortisone  20 mg Per G Tube QAM     levothyroxine  150 mcg Oral QAM AC     melatonin  6 mg Per NG tube At Bedtime     metoclopramide  5 mg Per Feeding Tube BID     pantoprazole  40 mg Per J Tube Daily     piperacillin-tazobactam  3.375 g Intravenous Q6H     potassium & sodium phosphates  1 packet Oral BID     Scopolamine HBr  0.8 mg Gastric Tube TID     sodium bicarbonate  650 mg Oral Daily     sodium chloride (PF)  3 mL Intracatheter Q8H     [facility]    Medicine Progress Note - Hospitalist Service       Date of Admission:  12/27/2019  Date of Service: 12/31/2019    Assessment & Plan      Recurrent fever  Sepsis  Recurrent Aspiration PNA  21st admission this year. Last admit was  12/23-25 for fever with suspected pneumonia (HCAP vs aspiration) due to bibasilar atelectasis seen on CXR. He was given zosyn in hospital, then transitioned to Augmentin for 10 days on discharge and continued on this until his presentation today, 12/27. CXR does not look significantly changed from prior. Tmax 101 at home. SBP 90s on admit with HR in 120s. Had episode of vomiting at home. WBC 7.7, Lactic acid 2.7 (improved to 2 after IVF), procalcitonin <0.05. Flu negative. UA without infection, no new symptoms. At this point, no new source for infection. Has been on antibiotics since 12/16 and continues to have fevers. Met sepsis criteria on admission, but his low BP and high HR could be secondary to GI losses with ongoing diarrhea  Plan:  - Monitor fever curve  - Follow up blood cultures  - Continue IV Zosyn  - Continue prior to admission Mucomyst/albuterol nebulizer treatments, but change to q4h around the clock instead of while awake if he indeed had aspiration event.  - Resume TF with schedule of [id number], [id number], [id number]  - ID consulted -> no indication for suppressive abx,   - Tylenol/ibuprofen available for fever     Diarrhea  - Ongoing loose stools likely secondary to antibiotics. No worrisome signs for c diff, will check enteric panel for completeness, but suspect will be negative.  Plan:  - IVF due to GI losses with hypotension/tachycardia.     Panhypopituitarism  Patient maintained on testosterone, hydrocortisone, levothyroxine.   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 Phylicia Stout of [facility]     Perianal pressure injury with skin tear  Many previous admissions with wounds, high risk for wound development from pressure and immobility.   - Frequent repositioning     Skin breakdown on penis, improved  Noted on previous admission between border of shaft of penis and glans penis on the  right. Suspect secondary to condom catheter.      Diet: Adult Formula Drip Feeding: Continuous Isosource 1.5; Jejunostomy; Goal Rate: 90 mL/hr x 12 hrs; mL/hr; From: 8:00 AM; 8:00 PM; Medication - Feeding Tube Flush Frequency: At least 15-30 mL water before and after medication administration and with ...    DVT Prophylaxis: Pneumatic Compression Devices  Lerma Catheter: not present  Code Status: Full Code      Disposition Plan   Expected discharge: Tomorrow, recommended to prior living arrangement once antibiotic plan established.  Entered: Jorge Mars MD 12/31/2019, 10:17 AM       The patient's care was discussed with the Bedside Nurse and Patient.    Jorge Mars MD  Hospitalist Service  [facility]    ______________________________________________________________________    Interval History     No complaints, non-verbal  Appears comfortable.    Data reviewed today: I reviewed all medications, new labs and imaging results over the last 24 hours. I personally reviewed no images or EKG's today.    Physical Exam   Vital Signs: Temp: 98.5  F (36.9  C) Temp src: Oral BP: 109/65 Pulse: 76 Heart Rate: 78 Resp: 18 SpO2: 92 % O2 Device: None (Room air) Oxygen Delivery: 3 LPM  Weight: 170 lbs 14.4 oz    Constitutional: Awake, alert, cooperative, smiling  Respiratory: Clear to auscultation bilaterally, no crackles or wheezing. Dry cough (heard once during my 20 minutes in room)  Cardiovascular: regular rate and regular rhythm, normal S1 and S2, and no murmur noted.  GI: Soft, non-distended, non-tender, normal bowel sounds.  Musculoskeletal: No joint swelling, erythema or tenderness. Chronic contractures.  Neurologic: Cranial nerves 2-12 intact, normal strength and sensation.  Psychiatric: Alert, says hello, at his baseline mental status.    Data   Recent Labs   Lab 12/30/19  0707 12/29/19  0628 12/28/19  1328 12/27/19  1930   WBC  --  10.1 14.7* 7.7   HGB  --  9.7* 10.2* 12.5*   MCV  --  83 83 83   PLT  --   115* 119* 154    140 142 136   POTASSIUM 3.7 3.6 4.0 3.9   CHLORIDE 104 111* 113* 102   CO2 25 26 25 29   BUN 11 13 13 19   CR 0.91 0.90 0.83 0.81   ANIONGAP 6 3 4 5   STEVE 8.3* 7.9* 7.6* 8.4*   GLC 87 115* 99 129*   ALBUMIN  --   --   --  3.1*   PROTTOTAL  --   --   --  7.9   BILITOTAL  --   --   --  0.2   ALKPHOS  --   --   --  95   ALT  --   --   --  36   AST  --   --   --  35     No results found for this or any previous visit (from the past 24 hour(s)).  Medications       acetylcysteine  2 mL Nebulization 4x Daily     albuterol  2.5 mg Nebulization 4x Daily     aspirin  81 mg Oral or Feeding Tube Daily     Brivaracetam  100 mg Oral or Feeding Tube BID     carBAMazepine  150 mg Oral or Feeding Tube Q6H     ferrous sulfate  220 mg Per Feeding Tube Daily     fiber modular (NUTRISOURCE FIBER)  1 packet Per Feeding Tube Daily     hydrocortisone  10 mg Per G Tube Daily with supper     hydrocortisone  20 mg Per G Tube QAM     levothyroxine  150 mcg Oral QAM AC     melatonin  6 mg Per NG tube At Bedtime     metoclopramide  5 mg Per Feeding Tube BID     pantoprazole  40 mg Per J Tube Daily     piperacillin-tazobactam  3.375 g Intravenous Q6H     potassium & sodium phosphates  1 packet Oral BID     Scopolamine HBr  0.8 mg Gastric Tube TID     sodium bicarbonate  650 mg Oral Daily     sodium chloride (PF)  3 mL Intracatheter Q8H

## 2019-12-31 NOTE — PLAN OF CARE
Discharge Planner PT   Patient plan for discharge: Unknown  Current status: Orders received and chart reviewed. Per chart review from previous hospital admissions, patient is lift dependent at baseline and total cares. No skilled IP PT needs identified, will complete orders at this time.   Barriers to return to prior living situation: Defer to medical team   Recommendations for discharge: Defer to medical team   Rationale for recommendations: Patient lift dependent and total cares at baseline. No skilled IP PT needs identified, will complete IP PT orders at this time.        Entered by: Gely Lee 12/31/2019 12:09 PM

## 2019-12-31 NOTE — TELEPHONE ENCOUNTER
[facility]     It appears this request came to [facility] ([facility]) in error- denying this RX to pharmacy with note to contact Pt's PCP     Pt is not associated with this practice per Chart Review    Francoise GUERRA RN

## 2019-12-31 NOTE — PLAN OF CARE
Patient non-verbal, sometimes will respond with garbled yes or no. CMS intact, VSS. Turn and repo q2x. Incontinent. Tube feed stopped at 1400, restart at 1800. Denies pain.

## 2020-01-01 LAB
ALBUMIN SERPL-MCNC: 2.5 G/DL (ref 3.4–5)
ALP SERPL-CCNC: 70 U/L (ref 40–150)
ALT SERPL W P-5'-P-CCNC: 21 U/L (ref 0–70)
ANION GAP SERPL CALCULATED.3IONS-SCNC: 6 MMOL/L (ref 3–14)
AST SERPL W P-5'-P-CCNC: 25 U/L (ref 0–45)
BILIRUB SERPL-MCNC: 0.4 MG/DL (ref 0.2–1.3)
BUN SERPL-MCNC: 13 MG/DL (ref 7–30)
CALCIUM SERPL-MCNC: 8.9 MG/DL (ref 8.5–10.1)
CHLORIDE SERPL-SCNC: 100 MMOL/L (ref 94–109)
CO2 SERPL-SCNC: 25 MMOL/L (ref 20–32)
CREAT SERPL-MCNC: 0.93 MG/DL (ref 0.66–1.25)
ERYTHROCYTE [DISTWIDTH] IN BLOOD BY AUTOMATED COUNT: 17.1 % (ref 10–15)
GFR SERPL CREATININE-BSD FRML MDRD: >90 ML/MIN/{1.73_M2}
GLUCOSE SERPL-MCNC: 85 MG/DL (ref 70–99)
HCT VFR BLD AUTO: 33.8 % (ref 40–53)
HGB BLD-MCNC: 11.1 G/DL (ref 13.3–17.7)
INR PPP: 1.28 (ref 0.86–1.14)
MCH RBC QN AUTO: 26.4 PG (ref 26.5–33)
MCHC RBC AUTO-ENTMCNC: 32.8 G/DL (ref 31.5–36.5)
MCV RBC AUTO: 80 FL (ref 78–100)
PLATELET # BLD AUTO: 238 10E9/L (ref 150–450)
POTASSIUM SERPL-SCNC: 3.8 MMOL/L (ref 3.4–5.3)
PROT SERPL-MCNC: 7.6 G/DL (ref 6.8–8.8)
RBC # BLD AUTO: 4.21 10E12/L (ref 4.4–5.9)
SODIUM SERPL-SCNC: 131 MMOL/L (ref 133–144)
WBC # BLD AUTO: 6.7 10E9/L (ref 4–11)

## 2020-01-01 PROCEDURE — 25000128 H RX IP 250 OP 636: Performed by: HOSPITALIST

## 2020-01-01 PROCEDURE — 85027 COMPLETE CBC AUTOMATED: CPT | Performed by: INTERNAL MEDICINE

## 2020-01-01 PROCEDURE — 25000132 ZZH RX MED GY IP 250 OP 250 PS 637: Mod: GY | Performed by: INTERNAL MEDICINE

## 2020-01-01 PROCEDURE — 94640 AIRWAY INHALATION TREATMENT: CPT

## 2020-01-01 PROCEDURE — 94640 AIRWAY INHALATION TREATMENT: CPT | Mod: 76

## 2020-01-01 PROCEDURE — 80053 COMPREHEN METABOLIC PANEL: CPT | Performed by: INTERNAL MEDICINE

## 2020-01-01 PROCEDURE — 99207 ZZC MOONLIGHTING INDICATOR: CPT | Performed by: INTERNAL MEDICINE

## 2020-01-01 PROCEDURE — 36415 COLL VENOUS BLD VENIPUNCTURE: CPT | Performed by: INTERNAL MEDICINE

## 2020-01-01 PROCEDURE — 40000275 ZZH STATISTIC RCP TIME EA 10 MIN

## 2020-01-01 PROCEDURE — 99232 SBSQ HOSP IP/OBS MODERATE 35: CPT | Performed by: INTERNAL MEDICINE

## 2020-01-01 PROCEDURE — 27210429 ZZH NUTRITION PRODUCT INTERMEDIATE LITER

## 2020-01-01 PROCEDURE — 12000000 ZZH R&B MED SURG/OB

## 2020-01-01 PROCEDURE — 25000125 ZZHC RX 250: Performed by: HOSPITALIST

## 2020-01-01 PROCEDURE — 85610 PROTHROMBIN TIME: CPT | Performed by: INTERNAL MEDICINE

## 2020-01-01 PROCEDURE — 25000132 ZZH RX MED GY IP 250 OP 250 PS 637: Mod: GY | Performed by: HOSPITALIST

## 2020-01-01 RX ORDER — AMOXICILLIN AND CLAVULANATE POTASSIUM 400; 57 MG/5ML; MG/5ML
875 POWDER, FOR SUSPENSION ORAL EVERY 12 HOURS SCHEDULED
Status: DISCONTINUED | OUTPATIENT
Start: 2020-01-01 | End: 2020-01-03 | Stop reason: HOSPADM

## 2020-01-01 RX ADMIN — Medication 1 PACKET: at 08:04

## 2020-01-01 RX ADMIN — Medication 0.8 MG: at 08:05

## 2020-01-01 RX ADMIN — Medication 40 MG: at 08:05

## 2020-01-01 RX ADMIN — HYDROCORTISONE 10 MG: 10 TABLET ORAL at 16:13

## 2020-01-01 RX ADMIN — ACETYLCYSTEINE 2 ML: 200 SOLUTION ORAL; RESPIRATORY (INHALATION) at 20:22

## 2020-01-01 RX ADMIN — ALBUTEROL SULFATE 2.5 MG: 2.5 SOLUTION RESPIRATORY (INHALATION) at 16:06

## 2020-01-01 RX ADMIN — ACETYLCYSTEINE 2 ML: 200 SOLUTION ORAL; RESPIRATORY (INHALATION) at 16:05

## 2020-01-01 RX ADMIN — HYDROCORTISONE 20 MG: 20 TABLET ORAL at 08:04

## 2020-01-01 RX ADMIN — CARBAMAZEPINE 150 MG: 100 SUSPENSION ORAL at 14:26

## 2020-01-01 RX ADMIN — POTASSIUM & SODIUM PHOSPHATES POWDER PACK 280-160-250 MG 1 PACKET: 280-160-250 PACK at 08:04

## 2020-01-01 RX ADMIN — LEVOTHYROXINE SODIUM 150 MCG: 75 TABLET ORAL at 06:34

## 2020-01-01 RX ADMIN — MELATONIN 6 MG: 3 TAB ORAL at 22:21

## 2020-01-01 RX ADMIN — Medication 0.8 MG: at 22:21

## 2020-01-01 RX ADMIN — ALBUTEROL SULFATE 2.5 MG: 2.5 SOLUTION RESPIRATORY (INHALATION) at 20:21

## 2020-01-01 RX ADMIN — CARBAMAZEPINE 150 MG: 100 SUSPENSION ORAL at 08:04

## 2020-01-01 RX ADMIN — ACETYLCYSTEINE 2 ML: 200 SOLUTION ORAL; RESPIRATORY (INHALATION) at 12:00

## 2020-01-01 RX ADMIN — ALBUTEROL SULFATE 2.5 MG: 2.5 SOLUTION RESPIRATORY (INHALATION) at 12:00

## 2020-01-01 RX ADMIN — METOCLOPRAMIDE 5 MG: 5 SOLUTION ORAL at 22:18

## 2020-01-01 RX ADMIN — BRIVARACETAM 100 MG: 10 SOLUTION ORAL at 10:03

## 2020-01-01 RX ADMIN — SODIUM BICARBONATE 650 MG TABLET 650 MG: at 08:04

## 2020-01-01 RX ADMIN — AMOXICILLIN AND CLAVULANATE POTASSIUM 875 MG: 400; 57 POWDER, FOR SUSPENSION ORAL at 19:53

## 2020-01-01 RX ADMIN — METOCLOPRAMIDE 5 MG: 5 SOLUTION ORAL at 08:04

## 2020-01-01 RX ADMIN — CARBAMAZEPINE 150 MG: 100 SUSPENSION ORAL at 03:33

## 2020-01-01 RX ADMIN — POTASSIUM & SODIUM PHOSPHATES POWDER PACK 280-160-250 MG 1 PACKET: 280-160-250 PACK at 16:14

## 2020-01-01 RX ADMIN — ACETYLCYSTEINE 2 ML: 200 SOLUTION ORAL; RESPIRATORY (INHALATION) at 08:23

## 2020-01-01 RX ADMIN — PIPERACILLIN AND TAZOBACTAM 3.38 G: 3; .375 INJECTION, POWDER, FOR SOLUTION INTRAVENOUS at 03:34

## 2020-01-01 RX ADMIN — ASPIRIN 81 MG 81 MG: 81 TABLET ORAL at 08:05

## 2020-01-01 RX ADMIN — AMOXICILLIN AND CLAVULANATE POTASSIUM 875 MG: 400; 57 POWDER, FOR SUSPENSION ORAL at 12:14

## 2020-01-01 RX ADMIN — Medication 0.8 MG: at 18:38

## 2020-01-01 RX ADMIN — ALBUTEROL SULFATE 2.5 MG: 2.5 SOLUTION RESPIRATORY (INHALATION) at 08:23

## 2020-01-01 RX ADMIN — MINERAL SUPPLEMENT IRON 300 MG / 5 ML STRENGTH LIQUID 100 PER BOX UNFLAVORED 220 MG: at 08:04

## 2020-01-01 RX ADMIN — PIPERACILLIN AND TAZOBACTAM 3.38 G: 3; .375 INJECTION, POWDER, FOR SOLUTION INTRAVENOUS at 08:04

## 2020-01-01 RX ADMIN — CARBAMAZEPINE 150 MG: 100 SUSPENSION ORAL at 22:18

## 2020-01-01 RX ADMIN — BRIVARACETAM 100 MG: 10 SOLUTION ORAL at 22:17

## 2020-01-01 ASSESSMENT — ACTIVITIES OF DAILY LIVING (ADL)
ADLS_ACUITY_SCORE: 34

## 2020-01-01 NOTE — PLAN OF CARE
[id number]: Pt is nonverbal. Tube feeding restarted at 1800. Incontinent. VSS on RA. Mepilex in place. Frequent turn and repositioning. Continue to monitor.

## 2020-01-01 NOTE — PLAN OF CARE
Q2H turn. Nonverbal, responds to yes and no on occasion. Incremental TF.  Incontinent of B&B.  Plan to discharge tomorrow home, pending [facility].

## 2020-01-01 NOTE — PROGRESS NOTES
[facility]  Infectious Disease Progress Note          Assessment and Plan:   IMPRESSION:   1.  A 59-year-old male, very well known to our group, admitted for a readmission after numerous recent admissions for acute fever, almost certainly further aspiration pneumonia.  Well documented history of this and numerous prior episodes similar to the current episode.   2.  Prior history of numerous aspiration pneumonia, sepsis and fever events, has had chronic Pseudomonas colonization which, of note, has at times historically been piperacillin resistant, also has other known resistant pathogens, both MRSA and VRE.  Of great note, a number of his episodes historically have been clear chemical aspiration without an obvious infection component.  Current episode may well be in that same group.   3.  Traumatic brain injury, chronic aphasia and spastic paralysis.   4.  History of seizure disorder.   5.  MRSA and VRE colonization.      RECOMMENDATIONS:   1.  Currently on Zosyn, ok to switch to oral Augmentin to finish a 7 days course of antibiotics.   2.  Prophylactic antibiotics for aspiration pneumonia are generally  not indicated, almost contraindicated due to the promotion of resistance and lack of effectiveness.  In his case, with substantial resistance risk as well as numerous episodes that have been chemical rather than bacterial pneumonitis, would not entertain any thought of chronic suppressive antibiotics.   3.  Overall situation is extremely difficult, no good answer to this problem, already doing maximum aspiration prevention interventions.              Interval History:   no new complaints loks same cxs neg               Medications:       acetylcysteine  2 mL Nebulization 4x Daily     albuterol  2.5 mg Nebulization 4x Daily     amoxicillin-clavulanate  875 mg Oral Q12H [facility]     aspirin  81 mg Oral or Feeding Tube Daily     Brivaracetam  100 mg Oral or Feeding Tube BID     carBAMazepine  150 mg  Oral or Feeding Tube Q6H     ferrous sulfate  220 mg Per Feeding Tube Daily     fiber modular (NUTRISOURCE FIBER)  1 packet Per Feeding Tube Daily     hydrocortisone  10 mg Per G Tube Daily with supper     hydrocortisone  20 mg Per G Tube QAM     levothyroxine  150 mcg Oral QAM AC     melatonin  6 mg Per NG tube At Bedtime     metoclopramide  5 mg Per Feeding Tube BID     pantoprazole  40 mg Per J Tube Daily     potassium & sodium phosphates  1 packet Oral BID     Scopolamine HBr  0.8 mg Gastric Tube TID     sodium bicarbonate  650 mg Oral Daily     sodium chloride (PF)  3 mL Intracatheter Q8H                  Physical Exam:   Blood pressure 109/65, pulse 76, temperature 98.4  F (36.9  C), temperature source Oral, resp. rate 16, height 1.829 m (6'), weight 77.5 kg (170 lb 14.4 oz), SpO2 95 %.  Wt Readings from Last 2 Encounters:   12/30/19 77.5 kg (170 lb 14.4 oz)   12/25/19 75.1 kg (165 lb 9.1 oz)     Vital Signs with Ranges  Temp:  [98.2  F (36.8  C)-99  F (37.2  C)] 98.4  F (36.9  C)  Heart Rate:  [76-85] 76  Resp:  [14-16] 16  BP: (109-117)/(60-72) 109/65  SpO2:  [94 %-95 %] 95 %    Constitutional: Awake, alert, neuro baseline   Lungs: Congestion to auscultation bilaterally, no crackles or wheezing   Cardiovascular: Regular rate and rhythm, normal S1 and S2, and no murmur noted   Abdomen: Normal bowel sounds, soft, non-distended, non-tender   Skin: No rashes, no cyanosis, no edema neuro same   Other:           Data:   All microbiology laboratory data reviewed.  Recent Labs   Lab Test 01/01/20  0735 12/31/19  1130 12/29/19  0628   WBC 6.7 6.7 10.1   HGB 11.1* 10.4* 9.7*   HCT 33.8* 31.4* 29.9*   MCV 80 81 83    171 115*     Recent Labs   Lab Test 01/01/20  0735 12/31/19  1130 12/30/19  0707   CR 0.93 0.88 0.91     No lab results found.  Recent Labs   Lab Test 12/27/19 1957 12/27/19  1931 12/27/19  1930 12/23/19  1247 12/23/19  1123 12/23/19  1114 12/16/19  2320 12/16/19  1722 12/16/19  1717   CULT No  growth after 5 days No growth after 5 days No growth No growth No growth No growth >100,000 colonies/mL  mixed urogenital jaime  Susceptibility testing not routinely done   No growth No growth

## 2020-01-01 NOTE — PROGRESS NOTES
[facility]    Medicine Progress Note - Hospitalist Service       Date of Admission:  12/27/2019  Date of Service: 01/01/2020    Assessment & Plan      Recurrent fever  Sepsis  Recurrent Aspiration PNA  21st admission this year. Last admit was 12/23-25 for fever with suspected pneumonia (HCAP vs aspiration) due to bibasilar atelectasis seen on CXR. He was given zosyn in hospital, then transitioned to Augmentin for 10 days on discharge and continued on this until his presentation today, 12/27. CXR does not look significantly changed from prior. Tmax 101 at home. SBP 90s on admit with HR in 120s. Had episode of vomiting at home. WBC 7.7, Lactic acid 2.7 (improved to 2 after IVF), procalcitonin <0.05. Flu negative. UA without infection, no new symptoms. At this point, no new source for infection. Has been on antibiotics since 12/16 and continues to have fevers. Met sepsis criteria on admission, but his low BP and high HR could be secondary to GI losses with ongoing diarrhea  Plan:  - Monitor fever curve  - Follow up blood cultures  - Continue IV Zosyn  - Continue prior to admission Mucomyst/albuterol nebulizer treatments, but change to q4h around the clock instead of while awake if he indeed had aspiration event.  - Resume TF with schedule of [id number], [id number], [id number]  - ID consulted -> no indication for suppressive abx. If can transition to oral antibiotcs per ID, will discharge today if home care set up.   - Tylenol/ibuprofen available for fever     Diarrhea  - Ongoing loose stools likely secondary to antibiotics. No worrisome signs for c diff,enteric panel negative.   Plan:  - IVF due to GI losses with hypotension/tachycardia.     Panhypopituitarism  Patient maintained on testosterone, hydrocortisone, levothyroxine.   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 Phylicia Stout of [facility]  neurology     Perianal pressure injury with skin tear  Many previous admissions with wounds, high risk for wound development from pressure and immobility.   - Frequent repositioning     Skin breakdown on penis, improved  Noted on previous admission between border of shaft of penis and glans penis on the right. Suspect secondary to condom catheter.      Diet: Adult Formula Drip Feeding: Continuous Isosource 1.5; Jejunostomy; Goal Rate: 90 mL/hr x 12 hrs; mL/hr; From: 8:00 AM; 8:00 PM; Medication - Feeding Tube Flush Frequency: At least 15-30 mL water before and after medication administration and with ...    DVT Prophylaxis: Pneumatic Compression Devices  Lerma Catheter: not present  Code Status: Full Code      Disposition Plan   Expected discharge: Medically ready for discharge.   Entered: Jorge Mars MD 01/01/2020, 10:08 AM     The patient's care was discussed with the Bedside Nurse and Patient.    Jorge Mars MD  Hospitalist Service  [facility]    _____________________________________________________________________    Interval History   No complaints, non-verbal. Appears comfortable. Ready for discharge.     Data reviewed today: I reviewed all medications, new labs and imaging results over the last 24 hours. I personally reviewed no images or EKG's today.    Physical Exam   Vital Signs: Temp: 98.4  F (36.9  C) Temp src: Oral BP: 109/65   Heart Rate: 76 Resp: 16 SpO2: 95 % O2 Device: None (Room air)    Weight: 170 lbs 14.4 oz    Constitutional: Awake, alert, cooperative, smiling  Respiratory: Clear to auscultation bilaterally, no crackles or wheezing. Dry cough (heard once during my 20 minutes in room)  Cardiovascular: regular rate and regular rhythm, normal S1 and S2, and no murmur noted.  GI: Soft, non-distended, non-tender, normal bowel sounds.  Musculoskeletal: No joint swelling, erythema or tenderness. Chronic contractures.  Neurologic: Cranial nerves 2-12 intact, normal strength and  sensation.  Psychiatric: Alert, says hello, at his baseline mental status.    Data   Recent Labs   Lab 01/01/20  0735 12/31/19  1130 12/30/19  0707 12/29/19  0628  12/27/19  1930   WBC 6.7 6.7  --  10.1   < > 7.7   HGB 11.1* 10.4*  --  9.7*   < > 12.5*   MCV 80 81  --  83   < > 83    171  --  115*   < > 154   INR 1.28*  --   --   --   --   --    * 133 135 140   < > 136   POTASSIUM 3.8 4.1 3.7 3.6   < > 3.9   CHLORIDE 100 103 104 111*   < > 102   CO2 25 23 25 26   < > 29   BUN 13 13 11 13   < > 19   CR 0.93 0.88 0.91 0.90   < > 0.81   ANIONGAP 6 7 6 3   < > 5   STEVE 8.9 8.1* 8.3* 7.9*   < > 8.4*   GLC 85 87 87 115*   < > 129*   ALBUMIN 2.5*  --   --   --   --  3.1*   PROTTOTAL 7.6  --   --   --   --  7.9   BILITOTAL 0.4  --   --   --   --  0.2   ALKPHOS 70  --   --   --   --  95   ALT 21  --   --   --   --  36   AST 25  --   --   --   --  35    < > = values in this interval not displayed.     No results found for this or any previous visit (from the past 24 hour(s)).  Medications       acetylcysteine  2 mL Nebulization 4x Daily     albuterol  2.5 mg Nebulization 4x Daily     aspirin  81 mg Oral or Feeding Tube Daily     Brivaracetam  100 mg Oral or Feeding Tube BID     carBAMazepine  150 mg Oral or Feeding Tube Q6H     ferrous sulfate  220 mg Per Feeding Tube Daily     fiber modular (NUTRISOURCE FIBER)  1 packet Per Feeding Tube Daily     hydrocortisone  10 mg Per G Tube Daily with supper     hydrocortisone  20 mg Per G Tube QAM     levothyroxine  150 mcg Oral QAM AC     melatonin  6 mg Per NG tube At Bedtime     metoclopramide  5 mg Per Feeding Tube BID     pantoprazole  40 mg Per J Tube Daily     piperacillin-tazobactam  3.375 g Intravenous Q6H     potassium & sodium phosphates  1 packet Oral BID     Scopolamine HBr  0.8 mg Gastric Tube TID     sodium bicarbonate  650 mg Oral Daily     sodium chloride (PF)  3 mL Intracatheter Q8H     [facility]    Medicine Progress Note - Hospitalist  Service       Date of Admission:  12/27/2019  Date of Service: 01/01/2020    Assessment & Plan      Recurrent fever  Sepsis  Recurrent Aspiration PNA  21st admission this year. Last admit was 12/23-25 for fever with suspected pneumonia (HCAP vs aspiration) due to bibasilar atelectasis seen on CXR. He was given zosyn in hospital, then transitioned to Augmentin for 10 days on discharge and continued on this until his presentation today, 12/27. CXR does not look significantly changed from prior. Tmax 101 at home. SBP 90s on admit with HR in 120s. Had episode of vomiting at home. WBC 7.7, Lactic acid 2.7 (improved to 2 after IVF), procalcitonin <0.05. Flu negative. UA without infection, no new symptoms. At this point, no new source for infection. Has been on antibiotics since 12/16 and continues to have fevers. Met sepsis criteria on admission, but his low BP and high HR could be secondary to GI losses with ongoing diarrhea  Plan:  - Monitor fever curve  - Follow up blood cultures  - Continue IV Zosyn  - Continue prior to admission Mucomyst/albuterol nebulizer treatments, but change to q4h around the clock instead of while awake if he indeed had aspiration event.  - Resume TF with schedule of [id number], [id number], [id number]  - ID consulted -> no indication for suppressive abx,   - Tylenol/ibuprofen available for fever     Diarrhea  - Ongoing loose stools likely secondary to antibiotics. No worrisome signs for c diff, will check enteric panel for completeness, but suspect will be negative.  Plan:  - IVF due to GI losses with hypotension/tachycardia.     Panhypopituitarism  Patient maintained on testosterone, hydrocortisone, levothyroxine.   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 Phylicia Stout of [facility]     Perianal pressure injury with skin tear  Many previous admissions with wounds, high risk for wound  development from pressure and immobility.   - Frequent repositioning     Skin breakdown on penis, improved  Noted on previous admission between border of shaft of penis and glans penis on the right. Suspect secondary to condom catheter.      Diet: Adult Formula Drip Feeding: Continuous Isosource 1.5; Jejunostomy; Goal Rate: 90 mL/hr x 12 hrs; mL/hr; From: 8:00 AM; 8:00 PM; Medication - Feeding Tube Flush Frequency: At least 15-30 mL water before and after medication administration and with ...    DVT Prophylaxis: Pneumatic Compression Devices  Lerma Catheter: not present  Code Status: Full Code      Disposition Plan   Expected discharge: Tomorrow, recommended to prior living arrangement once antibiotic plan established.  Entered: Jorge Mars MD 01/01/2020, 10:08 AM       The patient's care was discussed with the Bedside Nurse and Patient.    Jorge Mars MD  Hospitalist Service  [facility]    ______________________________________________________________________    Interval History     No complaints, non-verbal  Appears comfortable.    Data reviewed today: I reviewed all medications, new labs and imaging results over the last 24 hours. I personally reviewed no images or EKG's today.    Physical Exam   Vital Signs: Temp: 98.4  F (36.9  C) Temp src: Oral BP: 109/65   Heart Rate: 76 Resp: 16 SpO2: 95 % O2 Device: None (Room air)    Weight: 170 lbs 14.4 oz    Constitutional: Awake, alert, cooperative, smiling  Respiratory: Clear to auscultation bilaterally, no crackles or wheezing. Dry cough (heard once during my 20 minutes in room)  Cardiovascular: regular rate and regular rhythm, normal S1 and S2, and no murmur noted.  GI: Soft, non-distended, non-tender, normal bowel sounds.  Musculoskeletal: No joint swelling, erythema or tenderness. Chronic contractures.  Neurologic: Cranial nerves 2-12 intact, normal strength and sensation.  Psychiatric: Alert, says hello, at his baseline mental status.    Data    Recent Labs   Lab 01/01/20  0735 12/31/19  1130 12/30/19  0707 12/29/19  0628  12/27/19  1930   WBC 6.7 6.7  --  10.1   < > 7.7   HGB 11.1* 10.4*  --  9.7*   < > 12.5*   MCV 80 81  --  83   < > 83    171  --  115*   < > 154   INR 1.28*  --   --   --   --   --    * 133 135 140   < > 136   POTASSIUM 3.8 4.1 3.7 3.6   < > 3.9   CHLORIDE 100 103 104 111*   < > 102   CO2 25 23 25 26   < > 29   BUN 13 13 11 13   < > 19   CR 0.93 0.88 0.91 0.90   < > 0.81   ANIONGAP 6 7 6 3   < > 5   STEVE 8.9 8.1* 8.3* 7.9*   < > 8.4*   GLC 85 87 87 115*   < > 129*   ALBUMIN 2.5*  --   --   --   --  3.1*   PROTTOTAL 7.6  --   --   --   --  7.9   BILITOTAL 0.4  --   --   --   --  0.2   ALKPHOS 70  --   --   --   --  95   ALT 21  --   --   --   --  36   AST 25  --   --   --   --  35    < > = values in this interval not displayed.     No results found for this or any previous visit (from the past 24 hour(s)).  Medications       acetylcysteine  2 mL Nebulization 4x Daily     albuterol  2.5 mg Nebulization 4x Daily     aspirin  81 mg Oral or Feeding Tube Daily     Brivaracetam  100 mg Oral or Feeding Tube BID     carBAMazepine  150 mg Oral or Feeding Tube Q6H     ferrous sulfate  220 mg Per Feeding Tube Daily     fiber modular (NUTRISOURCE FIBER)  1 packet Per Feeding Tube Daily     hydrocortisone  10 mg Per G Tube Daily with supper     hydrocortisone  20 mg Per G Tube QAM     levothyroxine  150 mcg Oral QAM AC     melatonin  6 mg Per NG tube At Bedtime     metoclopramide  5 mg Per Feeding Tube BID     pantoprazole  40 mg Per J Tube Daily     piperacillin-tazobactam  3.375 g Intravenous Q6H     potassium & sodium phosphates  1 packet Oral BID     Scopolamine HBr  0.8 mg Gastric Tube TID     sodium bicarbonate  650 mg Oral Daily     sodium chloride (PF)  3 mL Intracatheter Q8H

## 2020-01-01 NOTE — PLAN OF CARE
Unable to assess orientation, pt sometimes responds to commands, pt is nonverbal at baseline, FLACC scale of 0. Turn and reposition q2, J tube flushed and turned off at 2200, will continue to monitor

## 2020-01-02 LAB
ALBUMIN SERPL-MCNC: 2.5 G/DL (ref 3.4–5)
ALP SERPL-CCNC: 71 U/L (ref 40–150)
ALT SERPL W P-5'-P-CCNC: 25 U/L (ref 0–70)
ANION GAP SERPL CALCULATED.3IONS-SCNC: 3 MMOL/L (ref 3–14)
ANION GAP SERPL CALCULATED.3IONS-SCNC: 5 MMOL/L (ref 3–14)
AST SERPL W P-5'-P-CCNC: 26 U/L (ref 0–45)
BACTERIA SPEC CULT: NO GROWTH
BACTERIA SPEC CULT: NO GROWTH
BILIRUB SERPL-MCNC: 0.3 MG/DL (ref 0.2–1.3)
BUN SERPL-MCNC: 13 MG/DL (ref 7–30)
BUN SERPL-MCNC: 15 MG/DL (ref 7–30)
CALCIUM SERPL-MCNC: 8.6 MG/DL (ref 8.5–10.1)
CALCIUM SERPL-MCNC: 8.6 MG/DL (ref 8.5–10.1)
CHLORIDE SERPL-SCNC: 98 MMOL/L (ref 94–109)
CHLORIDE SERPL-SCNC: 99 MMOL/L (ref 94–109)
CO2 SERPL-SCNC: 27 MMOL/L (ref 20–32)
CO2 SERPL-SCNC: 27 MMOL/L (ref 20–32)
CREAT SERPL-MCNC: 0.75 MG/DL (ref 0.66–1.25)
CREAT SERPL-MCNC: 0.78 MG/DL (ref 0.66–1.25)
ERYTHROCYTE [DISTWIDTH] IN BLOOD BY AUTOMATED COUNT: 16.5 % (ref 10–15)
GFR SERPL CREATININE-BSD FRML MDRD: >90 ML/MIN/{1.73_M2}
GFR SERPL CREATININE-BSD FRML MDRD: >90 ML/MIN/{1.73_M2}
GLUCOSE SERPL-MCNC: 102 MG/DL (ref 70–99)
GLUCOSE SERPL-MCNC: 83 MG/DL (ref 70–99)
HCT VFR BLD AUTO: 31.8 % (ref 40–53)
HGB BLD-MCNC: 10.6 G/DL (ref 13.3–17.7)
Lab: NORMAL
Lab: NORMAL
MCH RBC QN AUTO: 26.5 PG (ref 26.5–33)
MCHC RBC AUTO-ENTMCNC: 33.3 G/DL (ref 31.5–36.5)
MCV RBC AUTO: 80 FL (ref 78–100)
PHOSPHATE SERPL-MCNC: 2.9 MG/DL (ref 2.5–4.5)
PLATELET # BLD AUTO: 249 10E9/L (ref 150–450)
POTASSIUM SERPL-SCNC: 4 MMOL/L (ref 3.4–5.3)
POTASSIUM SERPL-SCNC: 4.4 MMOL/L (ref 3.4–5.3)
PROT SERPL-MCNC: 7.5 G/DL (ref 6.8–8.8)
RBC # BLD AUTO: 4 10E12/L (ref 4.4–5.9)
SODIUM SERPL-SCNC: 129 MMOL/L (ref 133–144)
SODIUM SERPL-SCNC: 130 MMOL/L (ref 133–144)
SPECIMEN SOURCE: NORMAL
SPECIMEN SOURCE: NORMAL
WBC # BLD AUTO: 6.8 10E9/L (ref 4–11)

## 2020-01-02 PROCEDURE — 85027 COMPLETE CBC AUTOMATED: CPT | Performed by: INTERNAL MEDICINE

## 2020-01-02 PROCEDURE — 94640 AIRWAY INHALATION TREATMENT: CPT | Mod: 76

## 2020-01-02 PROCEDURE — 84100 ASSAY OF PHOSPHORUS: CPT | Performed by: INTERNAL MEDICINE

## 2020-01-02 PROCEDURE — 40000275 ZZH STATISTIC RCP TIME EA 10 MIN

## 2020-01-02 PROCEDURE — 36415 COLL VENOUS BLD VENIPUNCTURE: CPT | Performed by: INTERNAL MEDICINE

## 2020-01-02 PROCEDURE — 12000000 ZZH R&B MED SURG/OB

## 2020-01-02 PROCEDURE — 36415 COLL VENOUS BLD VENIPUNCTURE: CPT | Performed by: HOSPITALIST

## 2020-01-02 PROCEDURE — 25000125 ZZHC RX 250: Performed by: HOSPITALIST

## 2020-01-02 PROCEDURE — 99232 SBSQ HOSP IP/OBS MODERATE 35: CPT | Performed by: HOSPITALIST

## 2020-01-02 PROCEDURE — 27210429 ZZH NUTRITION PRODUCT INTERMEDIATE LITER

## 2020-01-02 PROCEDURE — 80053 COMPREHEN METABOLIC PANEL: CPT | Performed by: INTERNAL MEDICINE

## 2020-01-02 PROCEDURE — 94640 AIRWAY INHALATION TREATMENT: CPT

## 2020-01-02 PROCEDURE — 25000132 ZZH RX MED GY IP 250 OP 250 PS 637: Mod: GY | Performed by: HOSPITALIST

## 2020-01-02 PROCEDURE — 25000132 ZZH RX MED GY IP 250 OP 250 PS 637: Mod: GY | Performed by: INTERNAL MEDICINE

## 2020-01-02 PROCEDURE — 80048 BASIC METABOLIC PNL TOTAL CA: CPT | Performed by: HOSPITALIST

## 2020-01-02 PROCEDURE — 84100 ASSAY OF PHOSPHORUS: CPT | Performed by: STUDENT IN AN ORGANIZED HEALTH CARE EDUCATION/TRAINING PROGRAM

## 2020-01-02 RX ADMIN — METOCLOPRAMIDE 5 MG: 5 SOLUTION ORAL at 20:12

## 2020-01-02 RX ADMIN — Medication 40 MG: at 08:02

## 2020-01-02 RX ADMIN — Medication 1 PACKET: at 08:02

## 2020-01-02 RX ADMIN — ALBUTEROL SULFATE 2.5 MG: 2.5 SOLUTION RESPIRATORY (INHALATION) at 19:23

## 2020-01-02 RX ADMIN — BRIVARACETAM 100 MG: 10 SOLUTION ORAL at 09:58

## 2020-01-02 RX ADMIN — LEVOTHYROXINE SODIUM 150 MCG: 75 TABLET ORAL at 06:51

## 2020-01-02 RX ADMIN — BRIVARACETAM 100 MG: 10 SOLUTION ORAL at 21:47

## 2020-01-02 RX ADMIN — AMOXICILLIN AND CLAVULANATE POTASSIUM 875 MG: 400; 57 POWDER, FOR SUSPENSION ORAL at 08:02

## 2020-01-02 RX ADMIN — POTASSIUM & SODIUM PHOSPHATES POWDER PACK 280-160-250 MG 1 PACKET: 280-160-250 PACK at 08:00

## 2020-01-02 RX ADMIN — ACETYLCYSTEINE 2 ML: 200 SOLUTION ORAL; RESPIRATORY (INHALATION) at 12:27

## 2020-01-02 RX ADMIN — ALBUTEROL SULFATE 2.5 MG: 2.5 SOLUTION RESPIRATORY (INHALATION) at 16:16

## 2020-01-02 RX ADMIN — ALBUTEROL SULFATE 2.5 MG: 2.5 SOLUTION RESPIRATORY (INHALATION) at 12:27

## 2020-01-02 RX ADMIN — MINERAL SUPPLEMENT IRON 300 MG / 5 ML STRENGTH LIQUID 100 PER BOX UNFLAVORED 220 MG: at 08:00

## 2020-01-02 RX ADMIN — MELATONIN 6 MG: 3 TAB ORAL at 21:47

## 2020-01-02 RX ADMIN — CARBAMAZEPINE 150 MG: 100 SUSPENSION ORAL at 16:14

## 2020-01-02 RX ADMIN — METOCLOPRAMIDE 5 MG: 5 SOLUTION ORAL at 08:02

## 2020-01-02 RX ADMIN — ACETYLCYSTEINE 2 ML: 200 SOLUTION ORAL; RESPIRATORY (INHALATION) at 08:11

## 2020-01-02 RX ADMIN — Medication 0.8 MG: at 21:47

## 2020-01-02 RX ADMIN — POTASSIUM & SODIUM PHOSPHATES POWDER PACK 280-160-250 MG 1 PACKET: 280-160-250 PACK at 16:14

## 2020-01-02 RX ADMIN — AMOXICILLIN AND CLAVULANATE POTASSIUM 875 MG: 400; 57 POWDER, FOR SUSPENSION ORAL at 20:12

## 2020-01-02 RX ADMIN — SODIUM BICARBONATE 650 MG TABLET 650 MG: at 08:00

## 2020-01-02 RX ADMIN — Medication 0.8 MG: at 16:15

## 2020-01-02 RX ADMIN — CARBAMAZEPINE 150 MG: 100 SUSPENSION ORAL at 08:01

## 2020-01-02 RX ADMIN — ASPIRIN 81 MG 81 MG: 81 TABLET ORAL at 08:01

## 2020-01-02 RX ADMIN — HYDROCORTISONE 10 MG: 10 TABLET ORAL at 16:14

## 2020-01-02 RX ADMIN — Medication 0.8 MG: at 08:01

## 2020-01-02 RX ADMIN — ALBUTEROL SULFATE 2.5 MG: 2.5 SOLUTION RESPIRATORY (INHALATION) at 08:11

## 2020-01-02 RX ADMIN — ACETYLCYSTEINE 2 ML: 200 SOLUTION ORAL; RESPIRATORY (INHALATION) at 16:16

## 2020-01-02 RX ADMIN — CARBAMAZEPINE 150 MG: 100 SUSPENSION ORAL at 20:12

## 2020-01-02 RX ADMIN — HYDROCORTISONE 20 MG: 20 TABLET ORAL at 08:01

## 2020-01-02 RX ADMIN — CARBAMAZEPINE 150 MG: 100 SUSPENSION ORAL at 03:20

## 2020-01-02 RX ADMIN — ACETYLCYSTEINE 2 ML: 200 SOLUTION ORAL; RESPIRATORY (INHALATION) at 19:23

## 2020-01-02 ASSESSMENT — ACTIVITIES OF DAILY LIVING (ADL)
ADLS_ACUITY_SCORE: 34
ADLS_ACUITY_SCORE: 36
ADLS_ACUITY_SCORE: 36
ADLS_ACUITY_SCORE: 34
ADLS_ACUITY_SCORE: 36
ADLS_ACUITY_SCORE: 34

## 2020-01-02 ASSESSMENT — MIFFLIN-ST. JEOR: SCORE: 1579.68

## 2020-01-02 NOTE — PLAN OF CARE
Unable to assess orientation, pt sometimes responds to commands, pt is nonverbal at baseline, FLACC scale of 0. Turn and reposition q2, will continue to monitor

## 2020-01-02 NOTE — PROGRESS NOTES
SPIRITUAL HEALTH SERVICES Progress Note  [facility]    Initial visit per LOS. Pt was watching TV when SH visited.  Pt indicated that he would welcome a prayer, so SH provided prayer.  Pt raised his hand with a thumb up and smiled.   wished pt well, and will plan to visit again next week if pt is still hospitalized.                                                                                                                                            Adarsh Acevedo M.Div., [facility]  Staff   Pager [phone]

## 2020-01-02 NOTE — PROGRESS NOTES
CLINICAL NUTRITION SERVICES - REASSESSMENT NOTE    Future Recommendations by Registered Dietitian (RD):   EN currently ordered per caregiver's request (Mother), Recommend increase to usual regimen given current schedule provides <50% estimated needs.     - Isosource 1.5 @ 100 ml/hr x 12 hours.   Total Enteral Provisions: 1800 cals (24 cals/kg), 82 gm pro (1.1 gm/kg, 91% est needs), 18 gm fiber, 912 mL free water  Nutrisource fiber 1 packet daily = 15 cals, 3 gm fiber  TOTAL: 1815 cals (24 cals/kg, 97% est needs), 21 gm fiber  Free Water Flush:  120 mL every 4 hrs   Malnutrition:   % Weight Loss:  None noted  % Intake:  Decreased intakes do not yet meet criteria  Subcutaneous Fat Loss:  None observed  Muscle Loss:  Decreased muscle mass (chronic) due to immobility, hemiplegia/TBI (no acute loss)  Fluid Retention:  None noted     Malnutrition Diagnosis: Patient does not meet two of the above criteria necessary for diagnosing malnutrition     EVALUATION OF PROGRESS TOWARD GOALS   Diet: NPO - baseline  Nutrition Support: EN is currently running as follows (MD entered order). -->     Type of Feeding Tube: JT  Enteral Frequency: Bolus  Enteral Regimen: Isosource 1.5 @ 90 mL/hr x 2 hours, TID (6 hours total 4543-3500, 8980-7068, 5315-7339)   Total Enteral Provisions: 540 mL, 810 calories, 37 g pro, 95 g CHO, 8 g fiber, 410 mL free water  Free Water Flush: 120 mL q 6 hrs  - Nutrisource Fiber daily for additional 15 kcal, 3 g fiber   - NeutraPhos BID  --> Isosource 1.5 @ 90 ml/hr x6 hrs daily:   Total 825 kcal (11 kcal/kg, 44% est energy needs), 37 g pro (0.5 g/kg, 41% protein needs), 1130 mL daily     Intake/Tolerance:   - No documented interruptions to enteral regimen.   - Labs:   Recent Labs   Lab Test 01/02/20  0641 01/01/20  0735 12/31/19  1130 12/30/19  0707 12/29/19  0628   POTASSIUM 4.0 3.8 4.1 3.7 3.6     Recent Labs   Lab Test 01/02/20  0641 12/30/19  0707 10/28/19  0746 10/10/19  0549 10/09/19  0852   PHOS 2.9  2.3* 2.7 2.9  Canceled, Test credited 1.8*     Recent Labs   Lab Test 01/02/20  0641 01/01/20  0735 12/31/19  1130 12/30/19  0707 12/29/19  0628   * 131* 133 135 140     Recent Labs   Lab Test 01/02/20  0641 01/01/20  0735 12/31/19  1130 12/30/19  0707 12/29/19  0628   CR 0.78 0.93 0.88 0.91 0.90     - Last BM 12/29 (2x)    ASSESSED NUTRITION NEEDS:  Dosing Weight:  74.8 kg (admit wt)  Estimated Energy Needs:  1099-2428 kcals (25-30 Kcal/Kg)  Justification: maintenance  Estimated Protein Needs:   grams protein (1.2-1.5 g pro/Kg)  Justification: preservation of lean body mass  Estimated Fluid Needs:  2976-0281 mL (1 mL/Kcal)  Justification: maintenance    NEW FINDINGS:   - Weight trending 71.7 kg today, lowest of admission. Appears stable.   - Per discussion with nursing this afternoon --> current EN regimen is per the request of Keyon's mother. She feels he was being fed too much. She hopes to feed him by mouth in the future as well.     Previous Goals:   TF Isosource 1.5 at 100 mL/hr x 12 hrs + Nutrisource Fiber 1 packet per day will meet % estimated needs  Evaluation: Not met - EN not currently ordered to run at goal    Previous Nutrition Diagnosis:   Inadequate enteral nutrition infusion related to TF on hold as evidenced by TF meeting 0% needs with plan to resume today   Evaluation: Improving, see below     CURRENT NUTRITION DIAGNOSIS  Inadequate enteral nutrition infusion related to reduction to EN regimen as evidenced by currently ordered to meet <50% estimated energy and protein needs.     INTERVENTIONS  Recommendations / Nutrition Prescription  EN currently ordered per MD, Recommend increase to usual regimen as able     - Isosource 1.5 @ 100 ml/hr x 12 hours.   Total Enteral Provisions: 1800 cals (24 cals/kg), 82 gm pro (1.1 gm/kg, 91% est needs), 18 gm fiber, 912 mL free water  Nutrisource fiber 1 packet daily = 15 cals, 3 gm fiber  TOTAL: 1815 cals (24 cals/kg, 97% est needs), 21 gm  fiber  Free Water Flush:  120 mL every 4 hrs    Implementation  None new today -- EN per MD order, at the request of Keyon's mother.   Collaboration and Referral of Nutrition Care --> discussed nutrition goals of pt/family with nursing staff.     Goals  Pt to tolerate current EN regimen.   No weight loss beyond 71 kg.       MONITORING AND EVALUATION:  Progress towards goals will be monitored and evaluated per protocol and Practice Guidelines    Marisel Fernández RD, LD  [facility] [place], 68, 57, [facility]   Pager: [phone]  Weekend Pager: [phone]

## 2020-01-02 NOTE — PROGRESS NOTES
[facility]    Medicine Progress Note - Hospitalist Service       Date of Admission:  12/27/2019  Assessment & Plan   Recurrent Aspiration Pneumonia  Sepsis  21st admission this year. Last admit was 12/23-25 for fever with suspected pneumonia (HCAP vs aspiration) due to bibasilar atelectasis seen on CXR. He was given zosyn in hospital, then transitioned to Augmentin for 10 days on discharge and continued on this until his presentation today, 12/27. CXR does not look significantly changed from prior. Tmax 101 at home. SBP 90s on admit with HR in 120s. Had episode of vomiting at home. WBC 7.7, Lactic acid 2.7 (improved to 2 after IVF), procalcitonin <0.05. Flu negative. UA without infection, no new symptoms.  - Suspect recurrent aspiration pneumonia.  - Initially treated with IV Zosyn, switched to Augmentin per feeding tube today by ID.  - ID assisting with management, appreciate their assistance.  - Continue prior to admission Mucomyst/albuterol nebulizer treatments.  - Continue TF with schedule of [id number], [id number], [id number].  - ID consulted -> no indication for suppressive abx.  - Tylenol/ibuprofen available for fever.  - Expect discharge home tomorrow if he remains afebrile.  - High risk for recurrent aspiration events and readmission. His mother is very concerned about this, however there is no good way to prevent this. His mother hopes that he can eat by mouth again as she feels that his recurrent issues started after the feeding tube was placed. Will consider speech therapy through home health after discharge for further evaluation. SLP was consulted in the hospital and did not feel that any intervention was indicated, see note from 12/30/19.     Diarrhea  - Improved.  - Enteric panel negative.    Hyponatremia  - Mild, asymptomatic.  - Recheck this afternoon, consider IV fluids if trending down.     Panhypopituitarism  - Continue PTA testosterone, hydrocortisone,  levothyroxine.      Epilepsy  Secondary to TBI.  - Continue PTA Tegretol, Briviact.  - Outpatient neurology follow-up with Dr. Phylicia Stout of [facility].     Perianal pressure injury with skin tear  Many previous admissions with wounds, high risk for wound development from pressure and immobility.   - Frequent repositioning.     Skin breakdown on penis, improved  Noted on previous admission between border of shaft of penis and glans penis on the right. Suspect secondary to condom catheter.       Diet: Adult Formula Drip Feeding: Continuous Isosource 1.5; Jejunostomy; Goal Rate: 90 mL/hr x 12 hrs; mL/hr; From: 8:00 AM; 8:00 PM; Medication - Feeding Tube Flush Frequency: At least 15-30 mL water before and after medication administration and with ...    DVT Prophylaxis: Pneumatic Compression Devices  Lerma Catheter: not present  Code Status: Full Code      Disposition Plan   Expected discharge: tomorrow  Entered: Israel Wright MD 01/02/2020, 1:23 PM       The patient's care was discussed with the Bedside Nurse, Care Coordinator/, Patient and Patient's Family.    Israel Wright MD  Hospitalist Service  [facility]    ______________________________________________________________________    Interval History   Keyon Farias was seen this afternoon. Appeared comfortable. He did not speak, but gave me a 'thumbs up' when I asked how he was doing. His mother was not present, but I spoke to her by phone shortly after I saw Keyon.    Data reviewed today: I reviewed all medications, new labs and imaging results over the last 24 hours. I personally reviewed no images or EKG's today.    Physical Exam   Vital Signs: Temp: 98.1  F (36.7  C) Temp src: Axillary BP: 115/69 Pulse: 66 Heart Rate: 70 Resp: 16 SpO2: 97 % O2 Device: None (Room air)    Weight: 158 lbs 0 oz  Constitutional: awake, alert, cooperative, no apparent distress  Respiratory: clear to auscultation  bilaterally, no crackles or wheezing  Cardiovascular: regular rate and rhythm, normal S1 and S2, no murmur noted  GI: normal bowel sounds, soft, non-distended, non-tender, GJ tube in place  Skin: warm, dry  Neurologic: awake, alert, did not speak but responded to some of my questions with a 'thumbs up'    Data   Recent Labs   Lab 01/02/20  0641 01/01/20  0735 12/31/19  1130   WBC 6.8 6.7 6.7   HGB 10.6* 11.1* 10.4*   MCV 80 80 81    238 171   INR  --  1.28*  --    * 131* 133   POTASSIUM 4.0 3.8 4.1   CHLORIDE 98 100 103   CO2 27 25 23   BUN 13 13 13   CR 0.78 0.93 0.88   ANIONGAP 5 6 7   STEVE 8.6 8.9 8.1*   GLC 83 85 87   ALBUMIN 2.5* 2.5*  --    PROTTOTAL 7.5 7.6  --    BILITOTAL 0.3 0.4  --    ALKPHOS 71 70  --    ALT 25 21  --    AST 26 25  --      Medications       acetylcysteine  2 mL Nebulization 4x Daily     albuterol  2.5 mg Nebulization 4x Daily     amoxicillin-clavulanate  875 mg Oral Q12H ALONDRA     aspirin  81 mg Oral or Feeding Tube Daily     Brivaracetam  100 mg Oral or Feeding Tube BID     carBAMazepine  150 mg Oral or Feeding Tube Q6H     ferrous sulfate  220 mg Per Feeding Tube Daily     fiber modular (NUTRISOURCE FIBER)  1 packet Per Feeding Tube Daily     hydrocortisone  10 mg Per G Tube Daily with supper     hydrocortisone  20 mg Per G Tube QAM     levothyroxine  150 mcg Oral QAM AC     melatonin  6 mg Per NG tube At Bedtime     metoclopramide  5 mg Per Feeding Tube BID     pantoprazole  40 mg Per J Tube Daily     potassium & sodium phosphates  1 packet Oral BID     Scopolamine HBr  0.8 mg Gastric Tube TID     sodium bicarbonate  650 mg Oral Daily     sodium chloride (PF)  3 mL Intracatheter Q8H

## 2020-01-02 NOTE — PROGRESS NOTES
[facility]  Infectious Disease Progress Note          Assessment and Plan:   IMPRESSION:   1.  A 59-year-old male, very well known to our group, admitted for a readmission after numerous recent admissions for acute fever, almost certainly further aspiration pneumonia.  Well documented history of this and numerous prior episodes similar to the current episode.   2.  Prior history of numerous aspiration pneumonia, sepsis and fever events, has had chronic Pseudomonas colonization which, of note, has at times historically been piperacillin resistant, also has other known resistant pathogens, both MRSA and VRE.  Of great note, a number of his episodes historically have been clear chemical aspiration without an obvious infection component.  Current episode may well be in that same group.   3.  Traumatic brain injury, chronic aphasia and spastic paralysis.   4.  History of seizure disorder.   5.  MRSA and VRE colonization.      RECOMMENDATIONS:   1.  switched  to oral Augmentin to finish a 7 days course of antibiotics. Afebrile   2.  Prophylactic antibiotics for aspiration pneumonia are generally  not indicated, almost contraindicated due to the promotion of resistance and lack of effectiveness.  In his case, with substantial resistance risk as well as numerous episodes that have been chemical rather than bacterial pneumonitis, would not entertain any thought of chronic suppressive antibiotics.   3.  Overall situation is extremely difficult, no good answer to this problem, already doing maximum aspiration prevention interventions.              Interval History:   no new complaints loks same cxs neg               Medications:       acetylcysteine  2 mL Nebulization 4x Daily     albuterol  2.5 mg Nebulization 4x Daily     amoxicillin-clavulanate  875 mg Oral Q12H [facility]     aspirin  81 mg Oral or Feeding Tube Daily     Brivaracetam  100 mg Oral or Feeding Tube BID     carBAMazepine  150 mg Oral or  Feeding Tube Q6H     ferrous sulfate  220 mg Per Feeding Tube Daily     fiber modular (NUTRISOURCE FIBER)  1 packet Per Feeding Tube Daily     hydrocortisone  10 mg Per G Tube Daily with supper     hydrocortisone  20 mg Per G Tube QAM     levothyroxine  150 mcg Oral QAM AC     melatonin  6 mg Per NG tube At Bedtime     metoclopramide  5 mg Per Feeding Tube BID     pantoprazole  40 mg Per J Tube Daily     potassium & sodium phosphates  1 packet Oral BID     Scopolamine HBr  0.8 mg Gastric Tube TID     sodium bicarbonate  650 mg Oral Daily     sodium chloride (PF)  3 mL Intracatheter Q8H                  Physical Exam:   Blood pressure 115/69, pulse 66, temperature 97.6  F (36.4  C), resp. rate 15, height 1.829 m (6'), weight 71.7 kg (158 lb), SpO2 98 %.  Wt Readings from Last 2 Encounters:   01/02/20 71.7 kg (158 lb)   12/25/19 75.1 kg (165 lb 9.1 oz)     Vital Signs with Ranges  Temp:  [97.6  F (36.4  C)-99  F (37.2  C)] 97.6  F (36.4  C)  Pulse:  [66] 66  Heart Rate:  [66-71] 71  Resp:  [15-16] 15  BP: (105-115)/(53-69) 115/69  SpO2:  [93 %-98 %] 98 %    Constitutional: Awake, alert, neuro baseline   Lungs: Congestion to auscultation bilaterally, no crackles or wheezing   Cardiovascular: Regular rate and rhythm, normal S1 and S2, and no murmur noted   Abdomen: Normal bowel sounds, soft, non-distended, non-tender   Skin: No rashes, no cyanosis, no edema neuro same   Other:           Data:   All microbiology laboratory data reviewed.  Recent Labs   Lab Test 01/02/20  0641 01/01/20  0735 12/31/19  1130   WBC 6.8 6.7 6.7   HGB 10.6* 11.1* 10.4*   HCT 31.8* 33.8* 31.4*   MCV 80 80 81    238 171     Recent Labs   Lab Test 01/02/20  0641 01/01/20  0735 12/31/19  1130   CR 0.78 0.93 0.88     No lab results found.  Recent Labs   Lab Test 12/27/19 1957 12/27/19  1931 12/27/19  1930 12/23/19  1247 12/23/19  1123 12/23/19  1114 12/16/19  2320 12/16/19  1722 12/16/19  1717   CULT No growth No growth No growth No growth  No growth No growth >100,000 colonies/mL  mixed urogenital jaime  Susceptibility testing not routinely done   No growth No growth

## 2020-01-02 NOTE — PLAN OF CARE
Pt Alert, vss, Orientated to person when asked. Non-verbal, able to answer yes and no. RA. Incontinent, voiding adequate. Repo q2hr. Miplex on coccyx, new dressing.

## 2020-01-02 NOTE — DISCHARGE INSTRUCTIONS
Your doctor has ordered home care to help you after your hospital stay.  They will contact you regarding your first visit.  This service will be provided by [facility].  If you have any question, or have not received a call within 48 hours of discharge, please call them at [phone].  *please see homecare quality ratings for all homecares in your area at www.medicare.gov       Your other care providers include:     +  [facility] (P:[phone]) (Fax:[phone]) and [facility]

## 2020-01-02 NOTE — CONSULTS
"Care Transition Initial Assessment - RN  Per H&P \"21st admission this year.\" (2019)    DATA   Active Problems:    Fever of unknown origin       Cognitive Status: per nursing notes non-verbal, \"sometimes responds with garbled yes or no.\"  Per [facility] documentation, pt \"has a COURT APPOINTED GUARDIAN for healthcare decisions dated 1-6-1\" (pt's mother, Savannah)      Contact information and PCP information verified: Yes, Dr. Carlos Gomez  Lives With: someone this visit documented \"spouse\" pt does not have a spouse, he lives with and is cared for by his mother       Insurance concerns: No Insurance issues identified   + continues with active Medicare / Medicaid [place]     ASSESSMENT  Patient currently receives the following services (per 12/24/19 documentation):    +  [facility] (P:[phone]) (Fax:[phone]) for RN 12 Hr coverage daily, plus 12 hr PCA adarsh/night through [facility] (currently 80.5 hours every 7 days) and TF with supplies through [facility].  Called [facility] 1/2/2020, pt's  is Jag (direct [phone]).  Updated her with potential discharge date of 1/3/2020.   + Resides with his Mother Savannah.   She is also his Legal Guardian.  His PCA also lives with them.  + Pt is attending a day program through [facility] for 3 hrs T-F AM.  Pt has transportation provided for this.    Identified issues/concerns regarding health management:   + Pt had 21 admissions in 2019, per H&P, was discharged 12/25/19 and readmitted 12/27/19.   + Per MD pt's mother has asked about speech therapy, MD states he would not do this as inpatient this visit due to long history; he is happy to add Speech Therapy evaluation for at home   + Spoke with pt's mother at 1500 1/2/2020 .  Reviewed MD note and goal for discharge tomorrow.  Reviewed Home Speech Therapy eval offer, pt's mother states she would use [facility] as [facility] does not offer speech eval.   Reviewed " "temperatures.  Pt's mother concerned \"98 degrees is a fever!\"  Relayed I do not see a goal of temp in MD notes to be less than 98; pt's mother states, \"I am sure I talked about it with the doctor.\"  This  relayed mother's concerns via text page, \"Is the goal temp 'less than 98 degrees?' Pt's mom says this is the plan. If so, FYI his temp was 98.1 at 1230.\"  MD states, \"pt's temperature is improving, I will meet with pt's mother tomorrow.\"  I relayed to MD that I have set up a ride for 1145 AM discharge to home via stretcher transportation.      PLAN  Financial costs for the patient include : copays or per insurance .  Patient given options and choices for discharge TBD .  Patient/family is agreeable to the plan?  TBD  Patient anticipates discharging to : home .   Patient anticipates needs for home equipment: no new equipment anticipated    Transportation/person available to transport:    per chart review,   + on past admission, Savannah reported she takes pt in her own car for his medical appointments.  She has had back surgery, so is not able to help pt's home care nurse put him in the vehicle.    + His mother typically likes his transportation home by 12:00 noon, via [employer] stretcher.  + Per mom the home has a ramp, no stairs.   + Confirmed the above, Ride set for 11:45 AM on 1/3/2020 via [facility] (stretcher)     Plan/Disposition: Home   Appointments:     + Patient has an upcoming appointment already scheduled with PCP, per Care Everywhere.  This was added to  AVS:   Upcoming Encounters  Upcoming Encounters   Date Type Specialty Care Team Description   01/07/2020 Office Visit FAMILY MEDICINE Carlos Gomez, SARAH    [address] [place] [address]    [phone] (Fax)              Care  (CTS) will continue to follow as needed.    Brooke Ivory RN, BSN, PHN  [facility] Care Coordination  [facility] "   Mobile: [phone]

## 2020-01-03 VITALS
BODY MASS INDEX: 21.4 KG/M2 | HEART RATE: 62 BPM | OXYGEN SATURATION: 96 % | SYSTOLIC BLOOD PRESSURE: 98 MMHG | DIASTOLIC BLOOD PRESSURE: 57 MMHG | RESPIRATION RATE: 16 BRPM | WEIGHT: 158 LBS | TEMPERATURE: 97.6 F | HEIGHT: 72 IN

## 2020-01-03 LAB
ANION GAP SERPL CALCULATED.3IONS-SCNC: 7 MMOL/L (ref 3–14)
BUN SERPL-MCNC: 15 MG/DL (ref 7–30)
CALCIUM SERPL-MCNC: 9 MG/DL (ref 8.5–10.1)
CHLORIDE SERPL-SCNC: 97 MMOL/L (ref 94–109)
CO2 SERPL-SCNC: 25 MMOL/L (ref 20–32)
CREAT SERPL-MCNC: 0.78 MG/DL (ref 0.66–1.25)
ERYTHROCYTE [DISTWIDTH] IN BLOOD BY AUTOMATED COUNT: 16.2 % (ref 10–15)
GFR SERPL CREATININE-BSD FRML MDRD: >90 ML/MIN/{1.73_M2}
GLUCOSE SERPL-MCNC: 88 MG/DL (ref 70–99)
HCT VFR BLD AUTO: 37.7 % (ref 40–53)
HGB BLD-MCNC: 12.6 G/DL (ref 13.3–17.7)
MCH RBC QN AUTO: 26.4 PG (ref 26.5–33)
MCHC RBC AUTO-ENTMCNC: 33.4 G/DL (ref 31.5–36.5)
MCV RBC AUTO: 79 FL (ref 78–100)
PLATELET # BLD AUTO: 265 10E9/L (ref 150–450)
POTASSIUM SERPL-SCNC: 3.7 MMOL/L (ref 3.4–5.3)
PROCALCITONIN SERPL-MCNC: <0.05 NG/ML
RBC # BLD AUTO: 4.78 10E12/L (ref 4.4–5.9)
SODIUM SERPL-SCNC: 129 MMOL/L (ref 133–144)
WBC # BLD AUTO: 7 10E9/L (ref 4–11)

## 2020-01-03 PROCEDURE — 80048 BASIC METABOLIC PNL TOTAL CA: CPT | Performed by: HOSPITALIST

## 2020-01-03 PROCEDURE — 85027 COMPLETE CBC AUTOMATED: CPT | Performed by: HOSPITALIST

## 2020-01-03 PROCEDURE — 25000132 ZZH RX MED GY IP 250 OP 250 PS 637: Mod: GY | Performed by: HOSPITALIST

## 2020-01-03 PROCEDURE — 25000132 ZZH RX MED GY IP 250 OP 250 PS 637: Mod: GY | Performed by: INTERNAL MEDICINE

## 2020-01-03 PROCEDURE — 84145 PROCALCITONIN (PCT): CPT | Performed by: HOSPITALIST

## 2020-01-03 PROCEDURE — 94640 AIRWAY INHALATION TREATMENT: CPT | Mod: 76

## 2020-01-03 PROCEDURE — 94640 AIRWAY INHALATION TREATMENT: CPT

## 2020-01-03 PROCEDURE — 99239 HOSP IP/OBS DSCHRG MGMT >30: CPT | Performed by: HOSPITALIST

## 2020-01-03 PROCEDURE — 25000125 ZZHC RX 250: Performed by: HOSPITALIST

## 2020-01-03 PROCEDURE — 36415 COLL VENOUS BLD VENIPUNCTURE: CPT | Performed by: HOSPITALIST

## 2020-01-03 PROCEDURE — 40000275 ZZH STATISTIC RCP TIME EA 10 MIN

## 2020-01-03 RX ORDER — MICONAZOLE NITRATE 20 MG/G
CREAM TOPICAL
Status: DISCONTINUED | OUTPATIENT
Start: 2020-01-03 | End: 2020-01-03 | Stop reason: HOSPADM

## 2020-01-03 RX ORDER — AMOXICILLIN AND CLAVULANATE POTASSIUM 400; 57 MG/5ML; MG/5ML
875 POWDER, FOR SUSPENSION ORAL EVERY 12 HOURS
Qty: 109 ML | Refills: 0 | Status: ON HOLD | OUTPATIENT
Start: 2020-01-03 | End: 2020-01-30

## 2020-01-03 RX ADMIN — AMOXICILLIN AND CLAVULANATE POTASSIUM 875 MG: 400; 57 POWDER, FOR SUSPENSION ORAL at 08:07

## 2020-01-03 RX ADMIN — ALBUTEROL SULFATE 2.5 MG: 2.5 SOLUTION RESPIRATORY (INHALATION) at 06:46

## 2020-01-03 RX ADMIN — ACETYLCYSTEINE 2 ML: 200 SOLUTION ORAL; RESPIRATORY (INHALATION) at 10:33

## 2020-01-03 RX ADMIN — CARBAMAZEPINE 150 MG: 100 SUSPENSION ORAL at 04:53

## 2020-01-03 RX ADMIN — ASPIRIN 81 MG 81 MG: 81 TABLET ORAL at 08:06

## 2020-01-03 RX ADMIN — CARBAMAZEPINE 150 MG: 100 SUSPENSION ORAL at 08:28

## 2020-01-03 RX ADMIN — ALBUTEROL SULFATE 2.5 MG: 2.5 SOLUTION RESPIRATORY (INHALATION) at 10:33

## 2020-01-03 RX ADMIN — METOCLOPRAMIDE 5 MG: 5 SOLUTION ORAL at 08:07

## 2020-01-03 RX ADMIN — MINERAL SUPPLEMENT IRON 300 MG / 5 ML STRENGTH LIQUID 100 PER BOX UNFLAVORED 220 MG: at 08:06

## 2020-01-03 RX ADMIN — Medication 40 MG: at 08:07

## 2020-01-03 RX ADMIN — Medication 1 PACKET: at 08:05

## 2020-01-03 RX ADMIN — ACETYLCYSTEINE 2 ML: 200 SOLUTION ORAL; RESPIRATORY (INHALATION) at 06:47

## 2020-01-03 RX ADMIN — LEVOTHYROXINE SODIUM 150 MCG: 75 TABLET ORAL at 08:05

## 2020-01-03 RX ADMIN — BRIVARACETAM 100 MG: 10 SOLUTION ORAL at 09:01

## 2020-01-03 RX ADMIN — POTASSIUM & SODIUM PHOSPHATES POWDER PACK 280-160-250 MG 1 PACKET: 280-160-250 PACK at 08:05

## 2020-01-03 RX ADMIN — Medication 0.8 MG: at 08:06

## 2020-01-03 RX ADMIN — SODIUM BICARBONATE 650 MG TABLET 650 MG: at 08:05

## 2020-01-03 RX ADMIN — HYDROCORTISONE 20 MG: 20 TABLET ORAL at 08:05

## 2020-01-03 ASSESSMENT — ACTIVITIES OF DAILY LIVING (ADL)
ADLS_ACUITY_SCORE: 36

## 2020-01-03 NOTE — PLAN OF CARE
A/Ox Confused. Ax Bedrest. VSS on RA. CMS intact ex memo area (place topical gel). Dressing C/D/I. Tolerating NPO Diet. Progressing per POC. Will Cont to monitor.

## 2020-01-03 NOTE — PLAN OF CARE
Pt. Alert to self, non - verbal, vss, bed rest with turn and reposition every 2 hrs, denied any pain, incontinent of bladder, coccyx dressing intact, mother at the bed side for half of the day, T.feeding given per order, plan: discharge to home at 1145 tomorrow. Will continue to monitor.

## 2020-01-03 NOTE — PROGRESS NOTES
[facility]  Infectious Disease Progress Note          Assessment and Plan:   IMPRESSION:   1.  A 59-year-old male, very well known to our group, admitted for a readmission after numerous recent admissions for acute fever, almost certainly further aspiration pneumonia.  Well documented history of this and numerous prior episodes similar to the current episode.   2.  Prior history of numerous aspiration pneumonia, sepsis and fever events, has had chronic Pseudomonas colonization which, of note, has at times historically been piperacillin resistant, also has other known resistant pathogens, both MRSA and VRE.  Of great note, a number of his episodes historically have been clear chemical aspiration without an obvious infection component.  Current episode may well be in that same group.   3.  Traumatic brain injury, chronic aphasia and spastic paralysis.   4.  History of seizure disorder.   5.  MRSA and VRE colonization.      RECOMMENDATIONS:   1. Continue oral Augmentin to finish a 7 days course of antibiotics. Afebrile   2.  Prophylactic antibiotics for aspiration pneumonia are generally  not indicated, almost contraindicated due to the promotion of resistance and lack of effectiveness.  In his case, with substantial resistance risk as well as numerous episodes that have been chemical rather than bacterial pneumonitis, would not entertain any thought of chronic suppressive antibiotics.   3.  Overall situation is extremely difficult, no good answer to this problem, already doing maximum aspiration prevention interventions.              Interval History:   no new complaints loks same cxs neg               Medications:       acetylcysteine  2 mL Nebulization 4x Daily     albuterol  2.5 mg Nebulization 4x Daily     amoxicillin-clavulanate  875 mg Oral Q12H [facility]     aspirin  81 mg Oral or Feeding Tube Daily     Brivaracetam  100 mg Oral or Feeding Tube BID     carBAMazepine  150 mg Oral or Feeding  Tube Q6H     ferrous sulfate  220 mg Per Feeding Tube Daily     fiber modular (NUTRISOURCE FIBER)  1 packet Per Feeding Tube Daily     hydrocortisone  10 mg Per G Tube Daily with supper     hydrocortisone  20 mg Per G Tube QAM     levothyroxine  150 mcg Oral QAM AC     melatonin  6 mg Per NG tube At Bedtime     metoclopramide  5 mg Per Feeding Tube BID     pantoprazole  40 mg Per J Tube Daily     potassium & sodium phosphates  1 packet Oral BID     Scopolamine HBr  0.8 mg Gastric Tube TID     sodium bicarbonate  650 mg Oral Daily     sodium chloride (PF)  3 mL Intracatheter Q8H                  Physical Exam:   Blood pressure 98/57, pulse 62, temperature 97.6  F (36.4  C), temperature source Axillary, resp. rate 16, height 1.829 m (6'), weight 71.7 kg (158 lb), SpO2 96 %.  Wt Readings from Last 2 Encounters:   01/02/20 71.7 kg (158 lb)   12/25/19 75.1 kg (165 lb 9.1 oz)     Vital Signs with Ranges  Temp:  [97.4  F (36.3  C)-98.1  F (36.7  C)] 97.6  F (36.4  C)  Pulse:  [62-69] 62  Heart Rate:  [60-79] 79  Resp:  [16] 16  BP: ()/(57-75) 98/57  SpO2:  [92 %-97 %] 96 %    Constitutional: Awake, alert, neuro baseline   Lungs: Congestion to auscultation bilaterally, no crackles or wheezing   Cardiovascular: Regular rate and rhythm, normal S1 and S2, and no murmur noted   Abdomen: Normal bowel sounds, soft, non-distended, non-tender   Skin: No rashes, no cyanosis, no edema neuro same   Other:           Data:   All microbiology laboratory data reviewed.  Recent Labs   Lab Test 01/03/20  0737 01/02/20  0641 01/01/20  0735   WBC 7.0 6.8 6.7   HGB 12.6* 10.6* 11.1*   HCT 37.7* 31.8* 33.8*   MCV 79 80 80    249 238     Recent Labs   Lab Test 01/03/20  0737 01/02/20  1530 01/02/20  0641   CR 0.78 0.75 0.78     No lab results found.  Recent Labs   Lab Test 12/27/19 1957 12/27/19  1931 12/27/19  1930 12/23/19  1247 12/23/19  1123 12/23/19  1114 12/16/19  2320 12/16/19  1722 12/16/19  1717   CULT No growth No growth  No growth No growth No growth No growth >100,000 colonies/mL  mixed urogenital jaime  Susceptibility testing not routinely done   No growth No growth

## 2020-01-03 NOTE — PLAN OF CARE
Pt. Alert to self, vss, bed rest and turn reposition every 2 hrs, denied any pain, incontinent of urine, tube feeding given 8am - 10am, discharge instruction,  antibiotic and one home medication that the pt. Brought from home are all  given to the  and pt. discharge to home via [facility] transportation at 12:40pm.

## 2020-01-03 NOTE — DISCHARGE SUMMARY
[facility]    Discharge Summary  Hospitalist    Date of Admission:  12/27/2019  Date of Discharge:  1/3/2020  Discharging Provider: Israel Wright MD  Date of Service (when I saw the patient): 01/03/20    Discharge Diagnoses   Recurrent Aspiration Pneumonia  Sepsis  Diarrhea  Hyponatremia  Panhypopituitarism  Traumatic brain injury  Epilepsy  Perianal pressure injury with skin tear, present on admission  Skin breakdown on penis, improved, present on admission    History of Present Illness   Keyon Farias is a 59 year old male who presented with a fever and cough. He has had multiple recent admission for recurrent aspiration pneumonia. He was discharged home two days prior to admission. He came back with a fever and cough and there was concern for recurrent aspiration pneumonia. He was admitted to the hospitalist service for further evaluation and management.    Hospital Course   Keyon Farias was admitted on 12/27/2019.  The following problems were addressed during his hospitalization:    Recurrent Aspiration Pneumonia  Sepsis  21st admission this year. Last admit was 12/23-25 for fever with suspected pneumonia (HCAP vs aspiration) due to bibasilar atelectasis seen on CXR. He was given zosyn in hospital, then transitioned to Augmentin for 10 days on discharge and continued on this until his presentation today, 12/27. CXR does not look significantly changed from prior. Tmax 101 at home. SBP 90s on admit with HR in 120s. Had episode of vomiting at home. WBC 7.7, Lactic acid 2.7 (improved to 2 after IVF), procalcitonin <0.05. Flu negative. UA without infection, no new symptoms.  - ID was consulted and assisted with management.  - Suspect recurrent aspiration pneumonia.  - Initially treated with IV Zosyn, switched to Augmentin per feeding tube by ID.   - No indication for suppressive abx.  - Continued prior to admission Mucomyst/albuterol nebulizer treatments.  - Continued TF with schedule of  [id number], 8554-1090, 4903-2813.  - High risk for recurrent aspiration events and readmission. His mother is very concerned about this, however there is no good way to prevent this. His mother hopes that he can eat by mouth again as she feels that his recurrent issues started after the feeding tube was placed. Consult speech therapy through home health after discharge for further evaluation. SLP was consulted in the hospital and did not feel that any intervention was indicated, see note from 12/30/19. Will also consult home health PT.  - Fever has resolved. WBC within normal limits.  - Discharge home today.  - Discharge plans discussed with his mother today in the room.     Diarrhea  - Improved.  - Enteric panel negative.     Hyponatremia  - Mild, asymptomatic.  - Sodium 129 on the day of discharge.  - Will resume is PTA tube feedings upon discharge, expect sodium to improve with this.  - Recheck BMP at follow-up appointment with PCP.     Panhypopituitarism  - Continue PTA testosterone, hydrocortisone, and levothyroxine.      Traumatic brain injury  Epilepsy  Secondary to TBI.  - Continue PTA Tegretol and Briviact.  - Outpatient neurology follow-up with Dr. Phylicia Stout of [facility] as previously arranged.     Perianal pressure injury with skin tear, present on admission  Many previous admissions with wounds, high risk for wound development from pressure and immobility.   - Frequent repositioning.     Skin breakdown on penis, improved, present on admission  Noted on previous admission between border of shaft of penis and glans penis on the right. Suspect secondary to condom catheter.    Israel Wright MD    Significant Results and Procedures   CXR as noted below.    Pending Results   None.    Code Status   Full Code       Primary Care Physician   Carlos Gomez    Physical Exam   Temp: 97.6  F (36.4  C) Temp src: Axillary BP: 98/57 Pulse: 62 Heart Rate: 79 Resp: 16 SpO2: 96 % O2 Device:  None (Room air)    Vitals:    12/29/19 0623 12/30/19 0711 01/02/20 0707   Weight: 75.7 kg (166 lb 12.8 oz) 77.5 kg (170 lb 14.4 oz) 71.7 kg (158 lb)     Vital Signs with Ranges  Temp:  [97.4  F (36.3  C)-98.1  F (36.7  C)] 97.6  F (36.4  C)  Pulse:  [62-69] 62  Heart Rate:  [60-79] 79  Resp:  [16] 16  BP: ()/(57-75) 98/57  SpO2:  [92 %-97 %] 96 %  I/O last 3 completed shifts:  In: 630 [I.V.:240; NG/GT:120]  Out: -     Constitutional: awake, alert, cooperative, no apparent distress  Respiratory: clear to auscultation bilaterally, no crackles or wheezing  Cardiovascular: regular rate and rhythm, normal S1 and S2, no murmur noted  GI: normal bowel sounds, soft, non-distended, non-tender, GJ tube in place  Skin: warm, dry  Neurologic: awake, alert, responded with a thumbs up to multiple questions    Discharge Disposition   Discharged to home  Condition at discharge: Stable    Consultations This Hospital Stay   INFECTIOUS DISEASES IP CONSULT  NUTRITION SERVICES ADULT IP CONSULT  NUTRITION SERVICES ADULT IP CONSULT  PHARMACY IP CONSULT  SPEECH LANGUAGE PATH ADULT IP CONSULT  PHYSICAL THERAPY ADULT IP CONSULT  OCCUPATIONAL THERAPY ADULT IP CONSULT  SOCIAL WORK IP CONSULT  CARE COORDINATOR IP CONSULT    Time Spent on this Encounter   I, Israel Wright MD, personally saw the patient today and spent greater than 30 minutes discharging this patient.    Discharge Orders      Home Care PT Referral for Hospital Discharge      Home Care SLP Referral for Hospital Discharge      Follow-up and recommended labs and tests     See your Primary Care Provider as previously scheduled,   January 7, 2020 with Dr. Carlos Gomez   [facility] ([phone]) [address] [place] [address]      Reason for your hospital stay    You were in the hospital due to aspiration pneumonia and were treated with antibiotics. You will continue antibiotics at home to complete the treatment.     Follow-up and recommended labs and  tests     Follow up with primary care provider, Carlos Gomez, on 1/7/2020 as previously scheduled. The following labs/tests are recommended: BMP.     Activity    Your activity upon discharge: as tolerated with assistance     MD face to face encounter    Documentation of Face to Face and Certification for Home Health Services    I certify that patient: Keyon Farias is under my care and that I, or a nurse practitioner or physician's assistant working with me, had a face-to-face encounter that meets the physician face-to-face encounter requirements with this patient on: 1/3/2020.    This encounter with the patient was in whole, or in part, for the following medical condition, which is the primary reason for home health care: traumatic brain injury.    I certify that, based on my findings, the following services are medically necessary home health services: Physical Therapy and Speech Language Therapy.    My clinical findings support the need for the above services because: Physical Therapy Services are needed to assess and treat the following functional impairments: severe deconditioning and spasticity due to traumatic brain injury. and Speech Therapy Services are needed to assess and treat impairments in language and/or swallow functions due to severe dysphagia.    Further, I certify that my clinical findings support that this patient is homebound (i.e. absences from home require considerable and taxing effort and are for medical reasons or Religion services or infrequently or of short duration when for other reasons) because: Requires assistance of another person or specialized equipment to access medical services because patient: Is unable to walk, requires supervision of another for safe transfer.    Based on the above findings. I certify that this patient is confined to the home and needs intermittent skilled nursing care, physical therapy and/or speech therapy.  The patient is under my care, and I have  initiated the establishment of the plan of care.  This patient will be followed by a physician who will periodically review the plan of care.  Physician/Provider to provide follow up care: Carlos Gomez    Attending hospital physician (the Medicare certified [facility] provider): Israel Wright MD  Physician Signature: See electronic signature associated with these discharge orders.  Date: 1/3/2020     Diet    Follow this diet upon discharge: Resume tube feedings as previously ordered.     Discharge Medications   Current Discharge Medication List      START taking these medications    Details   amoxicillin-clavulanate (AUGMENTIN) 400-57 MG/5ML suspension Take 10.9 mLs (875 mg) by mouth every 12 hours for 5 days  Qty: 109 mL, Refills: 0    Associated Diagnoses: Aspiration pneumonia of both lower lobes due to gastric secretions (H)         CONTINUE these medications which have NOT CHANGED    Details   acetylcysteine (MUCOMYST) 20 % neb solution Take 2 mLs by nebulization 4 times daily With albuterol at 0700, 1100, 1500, and 1900       albuterol (PROVENTIL) (5 MG/ML) 0.5% neb solution Take 2.5 mg by nebulization every 4 hours (while awake) 0700 1100 1500 1900 with mucomyst       aspirin (ASA) 81 MG chewable tablet 81 mg by Oral or Feeding Tube route daily At 0900      bacitracin ointment Apply topically daily as needed for wound care To PEG site.       Bioflavonoid Products (VITAMIN C PLUS) 1000 MG TABS 1 tablet by Oral or FT or NG tube route      Brivaracetam (BRIVIACT) 10 MG/ML solution 100 mg by Oral or Feeding Tube route 2 times daily 0900, 2100      calcium carbonate 1250 (500 CA) MG/5ML SUSP suspension 5 mLs (1,250 mg) by Per J Tube route 3 times daily (with meals)  Qty: 450 mL    Associated Diagnoses: Malnutrition (H)      carBAMazepine (TEGRETOL) 100 MG/5ML suspension 150 mg by Oral or Feeding Tube route every 6 hours At 06:00, 12:00, 18:00 and 24:00 for seizures       ferrous sulfate 220 (44 Fe) MG/5ML ELIX  220 mg by Per Feeding Tube route daily      Guar Gum (FIBER MODULAR, NUTRISOURCE FIBER,) packet 1 packet by Per G Tube route daily  Qty: 30 packet, Refills: 0    Associated Diagnoses: Pneumonia of both lower lobes due to infectious organism (H)      !! hydrocortisone (CORTEF) 5 MG tablet 10 mg by Oral or FT or NG tube route daily (with dinner) At 1500      !! hydrocortisone (CORTEF) 5 MG tablet 20 mg by Oral or FT or NG tube route every morning       hydrocortisone 1 % CREA cream Place rectally 2 times daily as needed for other Apply to reddened memo areas as needed      levothyroxine (SYNTHROID/LEVOTHROID) 150 MCG tablet Take 150 mcg by mouth every morning      melatonin (MELATONIN) 1 MG/ML LIQD liquid 6 mg by Per NG tube route At Bedtime       metoclopramide (REGLAN) 5 MG/5ML solution 5 mg by Per Feeding Tube route 2 times daily      miconazole (MICATIN) 2 % AERP powder Apply topically 2 times daily as needed       mupirocin (BACTROBAN) 2 % external ointment Apply topically 2 times daily as needed       order for DME Equipment being ordered: Nebulizer  Qty: 1 Units, Refills: 0    Associated Diagnoses: Pneumonia of both lower lobes due to infectious organism (H)      pantoprazole (PROTONIX) 2 mg/mL SUSP suspension 20 mLs (40 mg) by Per J Tube route daily  Qty: 400 mL, Refills: 1    Associated Diagnoses: Gastroesophageal reflux disease, esophagitis presence not specified      potassium & sodium phosphates (NEUTRA-PHOS) 280-160-250 MG Packet Take 1 packet by mouth 2 times daily       Scopolamine HBr POWD Dispense #90. Mix contents with small amount of water for admin via J-tube.  Administer 0.8 mg three times each day.  Qty: 90 Bottle, Refills: 11    Associated Diagnoses: Aspiration pneumonia (H)      Skin Protectants, Misc. (BALMEX SKIN PROTECTANT) OINT Externally apply topically 2 times daily as needed (irritation) Applay to reddened memo areas twice daily as needed      sodium bicarbonate 650 MG tablet Take 1  tablet (650 mg) by mouth daily  Qty: 30 tablet, Refills: 0    Associated Diagnoses: Hyponatremia      testosterone cypionate (DEPOTESTOTERONE CYPIONATE) 200 MG/ML injection Inject 76 mg into the muscle See Admin Instructions Every 2 weeks on Fridays   76 mg or 0.38 mL      vitamin D3 (CHOLECALCIFEROL) 2000 units (50 mcg) tablet Take 2,000 Units by mouth daily Crush and feed via j-tube @@ 0900       !! - Potential duplicate medications found. Please discuss with provider.      STOP taking these medications       amoxicillin-clavulanate (AUGMENTIN) 875-125 MG tablet Comments:   Reason for Stopping:             Allergies   Allergies   Allergen Reactions     Dilantin [Phenytoin Sodium]      Valproic Acid      Toxicity c bone marrow suspension, elevated ammonia levels      Data   Most Recent 3 CBC's:  Recent Labs   Lab Test 01/03/20  0737 01/02/20  0641 01/01/20  0735   WBC 7.0 6.8 6.7   HGB 12.6* 10.6* 11.1*   MCV 79 80 80    249 238      Most Recent 3 BMP's:  Recent Labs   Lab Test 01/03/20  0737 01/02/20  1530 01/02/20  0641   * 129* 130*   POTASSIUM 3.7 4.4 4.0   CHLORIDE 97 99 98   CO2 25 27 27   BUN 15 15 13   CR 0.78 0.75 0.78   ANIONGAP 7 3 5   STEVE 9.0 8.6 8.6   GLC 88 102* 83     Most Recent 2 LFT's:  Recent Labs   Lab Test 01/02/20  0641 01/01/20  0735   AST 26 25   ALT 25 21   ALKPHOS 71 70   BILITOTAL 0.3 0.4     Most Recent INR's and Anticoagulation Dosing History:  Anticoagulation Dose History     Recent Dosing and Labs Latest Ref Rng & Units 1/22/2017 1/22/2017 1/23/2017 1/25/2017 1/30/2017 2/7/2017 1/1/2020    INR 0.86 - 1.14 2.48(H) 2.58(H) 1.53(H) 1.49(H) 1.32(H) 1.27(H) 1.28(H)        Most Recent 6 Bacteria Isolates From Any Culture (See EPIC Reports for Culture Details):  Recent Labs   Lab Test 12/27/19 1957 12/27/19 1931 12/27/19 1930 12/23/19  1247 12/23/19  1123 12/23/19  1114   CULT No growth No growth No growth No growth No growth No growth     Results for orders placed or  performed during the hospital encounter of 12/27/19   XR Chest Port 1 View    Narrative    XR PORTABLE CHEST ONE VIEW   12/27/2019 8:27 PM     HISTORY: Fever, cough.    COMPARISON: Chest x-ray on 12/23/2019      Impression    IMPRESSION: Single portable AP view of the chest was obtained. Stable  cardiomediastinal silhouette. Mild bibasilar pulmonary opacities, left  greater than right, likely atelectasis. No significant pleural  effusion or pneumothorax.    DIANE DON MD

## 2020-01-03 NOTE — PROVIDER NOTIFICATION
Care Coordination:    Notified MD, noted pt's temperature was below 98  F (per mother's goal prior to discharge).  Ride is still set for 11:45 AM.  Anticipate MD will discharge pt today.  Please alert care coordinator if discharge goals have changed.      Brooke Ivory RN, BSN, PHN  [facility] Care Coordination  [facility]   Mobile: [phone]

## 2020-01-13 ENCOUNTER — APPOINTMENT (OUTPATIENT)
Dept: INTERVENTIONAL RADIOLOGY/VASCULAR | Facility: CLINIC | Age: 60
End: 2020-01-13
Attending: RADIOLOGY
Payer: MEDICARE

## 2020-01-13 ENCOUNTER — HOSPITAL ENCOUNTER (OUTPATIENT)
Facility: CLINIC | Age: 60
Discharge: HOME | End: 2020-01-13
Attending: RADIOLOGY | Admitting: RADIOLOGY
Payer: MEDICARE

## 2020-01-13 VITALS
OXYGEN SATURATION: 93 % | SYSTOLIC BLOOD PRESSURE: 109 MMHG | RESPIRATION RATE: 20 BRPM | DIASTOLIC BLOOD PRESSURE: 72 MMHG

## 2020-01-13 DIAGNOSIS — R13.10 DYSPHAGIA, UNSPECIFIED TYPE: ICD-10-CM

## 2020-01-13 PROCEDURE — 49452 REPLACE G-J TUBE PERC: CPT

## 2020-01-13 PROCEDURE — 27210815 ZZ H TUBE GASTRO CR13

## 2020-01-13 PROCEDURE — C1769 GUIDE WIRE: HCPCS

## 2020-01-13 NOTE — PRE-PROCEDURE
GENERAL PRE-PROCEDURE:   Procedure:  Gastro jejunostomy tube exchange  Date/Time:  1/13/2020 3:22 PM    Written consent obtained?: Yes    Risks and benefits: Risks, benefits and alternatives were discussed    Consent given by:  Parent  Patient states understanding of procedure being performed: Yes    Patient's understanding of procedure matches consent: Yes    Procedure consent matches procedure scheduled: Yes    Appropriately NPO:  Yes

## 2020-01-13 NOTE — DISCHARGE INSTRUCTIONS
G-tube Exchange Discharge Instructions     After you go home:      You may resume your normal diet    Care of Insertion Site:      For the first 48 hrs, check your puncture site every couple hours while you are awake     You may remove/change the dressing tomorrow    You may shower tomorrow    No tub baths, whirlpools or swimming until your puncture site has fully healed     Activity       You may go back to normal activity in 24 hours    Wait 48 hours before lifting, straining, exercise or other strenuous activity    Medicines:      You may resume all medications    Resume your Warfarin/Coumadin at your regular dose today. Follow up with your provider to have your INR rechecked    Resume your Platelet Inhibitors and Aspirin tomorrow at your regular dose    For minor pain, you may take Acetaminophen (Tylenol) or Ibuprofen (Advil)                 Call the provider who ordered this procedure if:      Blood or fluid is draining from the site    The site is red, swollen, hot, tender or there is foul-smelling drainage    Chills or a fever greater than 101 F (38 C)    Increased pain at the site    Any questions or concerns    Call  911 or go to the Emergency Room if:      Severe pain or trouble breathing    Bleeding that you cannot control      If you have questions call:          [facility] Radiology Dept @ [phone]        The provider who performed your procedure was _________________.        Caring for your G-tube    Tube Maintenance:    Possible problems with your tube may include:      Clogged with medications or feedings - most obstructions can be cleared with a small (3cc) syringe and warm water. This may be repeated until the tube is unclogged. This can be prevented by frequently flushing the tube with water (60cc) during the day and always after medications & feedings.      Tube pulls out or falls out -cover the opening with gauze & tape. Call [phone] for further instructions      Skin breakdown  and/or yeast infections at the insertion site - use of skin barrier ointments and anti-fungal powders can treat most site irritations.  Ask your pharmacist or provider for assistance (a prescription is not necessary).    In general, tube problems (including pulled tubes) are NOT emergency situations. Unless the pulling out of a tube is accompanied by uncontrollable bleeding, please DO NOT GO TO THE EMERGENCY ROOM!  Call [phone] with problems.    Tube Care:      Change the gauze dressing every 24 hours and if soiled (dirty).  Stabilize all tubes securely by using gauze and tape.  Clean tube site with soap & water using a cotton applicator (Q-tip) as needed to prevent irritation.    Flush feeding tube with 60cc of warm or room temperature water before and after meds.  To prevent the tube from clogging, ask your provider or pharmacist if liquid forms of your medications are available. If not, crush the pills well & be sure to flush the tube before & after all medications.    Flush feeding tube a minimum of every 4 hours and when feeding is completed with 60cc of water to keep the tube clear and avoid clogging.    Pt may use an abdominal (waist) binder to protect the G-tube.    If there is continued oozing or bleeding, redness, yellow/green/foul smelling drainage    STOP the feedings & use of the tube immediately if there is:      Continued oozing or bleeding at the site    Redness    Yellow/green or foul smelling drainage at the site    Uncontrolled stomach pain    Many of the supplies mentioned above can be purchased at your local pharmacy      For issues with your tube, please call:    [facility] Dept at [phone]

## 2020-01-13 NOTE — PROGRESS NOTES
Care Suites Admission Nursing Note    Reason for admission: G tube exchange  CS arrival time: 1500  Accompanied by: Mom  Name/phone of DC : Savannah  Medications held: No   Consent signed: Yes with Osiris NP  Abnormal assessment/labs: n/a  If abnormal, provider notified: n/a  Education/questions answered: yes  Plan: home post procedure with caregivers    Care Suites Discharge Nursing Note    Education/questions answered: Yes, AVS given  Patient DC location: Home  Accompanied by: Caregivers  CS discharge time: 1615

## 2020-01-29 ENCOUNTER — HOSPITAL ENCOUNTER (EMERGENCY)
Facility: CLINIC | Age: 60
Discharge: HOME | End: 2020-01-30
Attending: EMERGENCY MEDICINE | Admitting: EMERGENCY MEDICINE
Payer: MEDICARE

## 2020-01-29 ENCOUNTER — APPOINTMENT (OUTPATIENT)
Dept: GENERAL RADIOLOGY | Facility: CLINIC | Age: 60
End: 2020-01-29
Attending: EMERGENCY MEDICINE
Payer: MEDICARE

## 2020-01-29 DIAGNOSIS — T85.528A GASTROJEJUNOSTOMY TUBE DISLODGEMENT: ICD-10-CM

## 2020-01-29 PROCEDURE — 40000986 XR ABDOMEN 1 VW

## 2020-01-29 PROCEDURE — 99283 EMERGENCY DEPT VISIT LOW MDM: CPT

## 2020-01-29 ASSESSMENT — MIFFLIN-ST. JEOR: SCORE: 1611.44

## 2020-01-29 NOTE — ED AVS SNAPSHOT
Emergency Department  [address] [place] [address]  Phone:  [phone]  Fax:  [phone]                                    Keyon Farias   MRN: [id number]    Department:   Emergency Department   Date of Visit:  1/29/2020           After Visit Summary Signature Page    I have received my discharge instructions, and my questions have been answered. I have discussed any challenges I see with this plan with the nurse or doctor.    ..........................................................................................................................................  Patient/Patient Representative Signature      ..........................................................................................................................................  Patient Representative Print Name and Relationship to Patient    ..................................................               ................................................  Date                                   Time    ..........................................................................................................................................  Reviewed by Signature/Title    ...................................................              ..............................................  Date                                               Time          22EPIC Rev 08/18

## 2020-01-30 ENCOUNTER — APPOINTMENT (OUTPATIENT)
Dept: INTERVENTIONAL RADIOLOGY/VASCULAR | Facility: CLINIC | Age: 60
End: 2020-01-30
Attending: NURSE PRACTITIONER
Payer: MEDICARE

## 2020-01-30 ENCOUNTER — HOSPITAL ENCOUNTER (OUTPATIENT)
Facility: CLINIC | Age: 60
Discharge: HOME | End: 2020-01-30
Attending: RADIOLOGY | Admitting: RADIOLOGY
Payer: MEDICARE

## 2020-01-30 VITALS
HEART RATE: 54 BPM | OXYGEN SATURATION: 100 % | TEMPERATURE: 97.1 F | SYSTOLIC BLOOD PRESSURE: 121 MMHG | HEIGHT: 72 IN | BODY MASS INDEX: 22.35 KG/M2 | RESPIRATION RATE: 16 BRPM | WEIGHT: 165 LBS | DIASTOLIC BLOOD PRESSURE: 64 MMHG

## 2020-01-30 VITALS
SYSTOLIC BLOOD PRESSURE: 119 MMHG | BODY MASS INDEX: 22.35 KG/M2 | OXYGEN SATURATION: 96 % | DIASTOLIC BLOOD PRESSURE: 78 MMHG | TEMPERATURE: 97.9 F | HEIGHT: 72 IN | WEIGHT: 165 LBS | HEART RATE: 66 BPM | RESPIRATION RATE: 16 BRPM

## 2020-01-30 DIAGNOSIS — K94.23 GASTROSTOMY TUBE DYSFUNCTION (H): ICD-10-CM

## 2020-01-30 PROCEDURE — 27210915 ZZ H TUBE GASTRO CR4

## 2020-01-30 PROCEDURE — 49452 REPLACE G-J TUBE PERC: CPT

## 2020-01-30 PROCEDURE — 27210742 ZZH CATH CR1

## 2020-01-30 PROCEDURE — 27210815 ZZ H TUBE GASTRO CR13

## 2020-01-30 PROCEDURE — 40000854 ZZH STATISTIC SIMPLE TUBE INSERTION/CHARGE, PORT, CATH, FISTULOGRAM

## 2020-01-30 PROCEDURE — C1769 GUIDE WIRE: HCPCS

## 2020-01-30 RX ORDER — DEXTROSE MONOHYDRATE 25 G/50ML
25-50 INJECTION, SOLUTION INTRAVENOUS
Status: DISCONTINUED | OUTPATIENT
Start: 2020-01-30 | End: 2020-01-30 | Stop reason: HOSPADM

## 2020-01-30 RX ORDER — NICOTINE POLACRILEX 4 MG
15-30 LOZENGE BUCCAL
Status: DISCONTINUED | OUTPATIENT
Start: 2020-01-30 | End: 2020-01-30 | Stop reason: HOSPADM

## 2020-01-30 ASSESSMENT — MIFFLIN-ST. JEOR: SCORE: 1611.44

## 2020-01-30 NOTE — PROGRESS NOTES
Care Suites Discharge Nursing Note    Patient Information  Name: Keyon Farias  Age: 59 year old    Discharge Education:  Discharge instructions reviewed: YES  Additional education/resources provided: N/A  Patient/patient representative verbalizes understanding: YES  Patient discharging on new medications: NO  Medication education completed: N/A    Discharge Plans:   Discharge location: home  Discharge name/phone number: Caregivers present  Discharge ride contacted: YES  Approximate discharge time: 1445    Discharge Criteria:  Discharge criteria met and vital signs stable: YES    Patient Belongs:  Patient belongings returned to patient: YES    Nidhi Champion RN

## 2020-01-30 NOTE — DISCHARGE INSTRUCTIONS
G-tube Exchange Discharge Instructions     After you go home:      You may resume your normal diet    Care of Insertion Site:      For the first 48 hrs, check your puncture site every couple hours while you are awake     You may remove/change the dressing tomorrow    You may shower tomorrow    No tub baths, whirlpools or swimming until your puncture site has fully healed     Activity       You may go back to normal activity in 24 hours    Wait 48 hours before lifting, straining, exercise or other strenuous activity    Medicines:      You may resume all medications    Resume your Warfarin/Coumadin at your regular dose today. Follow up with your provider to have your INR rechecked    Resume your Platelet Inhibitors and Aspirin tomorrow at your regular dose    For minor pain, you may take Acetaminophen (Tylenol) or Ibuprofen (Advil)                 Call the provider who ordered this procedure if:      Blood or fluid is draining from the site    The site is red, swollen, hot, tender or there is foul-smelling drainage    Chills or a fever greater than 101 F (38 C)    Increased pain at the site    Any questions or concerns    Call  911 or go to the Emergency Room if:      Severe pain or trouble breathing    Bleeding that you cannot control      If you have questions call:          [facility] Radiology Dept @ [phone]        The provider who performed your procedure was _________________.        Caring for your G-tube    Tube Maintenance:    Possible problems with your tube may include:      Clogged with medications or feedings - most obstructions can be cleared with a small (3cc) syringe and warm water. This may be repeated until the tube is unclogged. This can be prevented by frequently flushing the tube with water (60cc) during the day and always after medications & feedings.      Tube pulls out or falls out -cover the opening with gauze & tape. Call [phone] for further instructions      Skin breakdown  and/or yeast infections at the insertion site - use of skin barrier ointments and anti-fungal powders can treat most site irritations.  Ask your pharmacist or provider for assistance (a prescription is not necessary).    In general, tube problems (including pulled tubes) are NOT emergency situations. Unless the pulling out of a tube is accompanied by uncontrollable bleeding, please DO NOT GO TO THE EMERGENCY ROOM!  Call [phone] with problems.    Tube Care:      Change the gauze dressing every 24 hours and if soiled (dirty).  Stabilize all tubes securely by using gauze and tape.  Clean tube site with soap & water using a cotton applicator (Q-tip) as needed to prevent irritation.    Flush feeding tube with 60cc of warm or room temperature water before and after meds.  To prevent the tube from clogging, ask your provider or pharmacist if liquid forms of your medications are available. If not, crush the pills well & be sure to flush the tube before & after all medications.    Flush feeding tube a minimum of every 4 hours and when feeding is completed with 60cc of water to keep the tube clear and avoid clogging.    Pt may use an abdominal (waist) binder to protect the G-tube.    If there is continued oozing or bleeding, redness, yellow/green/foul smelling drainage    STOP the feedings & use of the tube immediately if there is:      Continued oozing or bleeding at the site    Redness    Yellow/green or foul smelling drainage at the site    Uncontrolled stomach pain    Many of the supplies mentioned above can be purchased at your local pharmacy      For issues with your tube, please call:    [place] [facility] Dept at [phone]

## 2020-01-30 NOTE — ED TRIAGE NOTES
Pt was being transferred from his wheelchair and his G/J tube got caught in the wheelchair and pulled out

## 2020-01-30 NOTE — ED PROVIDER NOTES
History     Chief Complaint:  G/J Tube Problem    HPI   Keyon Farias is a 59 year old male with a history of aphasia due to TBI, current aspiration pneumonia, GJ tube dependence who presents with his Mother and nurse and with a G/J tube problem. The patient's nurse reported that he was transferring the patient from his wheelchair to his bed and did not realize that his tube was caught on the wheelchair and subsequently pulled it out. He replaced the tube to make sure the hole did not close but brought the patient to the ED to ensure proper placement. The patient gets all of his food through the G-tube and receives 12 hours of nursing care and 12 hours of PCA care a day.  No other acute concerns about his health recently.    History limited due to underlying aphasia.     Allergies:  Dilantin  Valproic Acid     Medications:    Mucomyst  Augmentin  Albuterol  Aspirin 81 mg  Briviact  Tegretol  Iron  Cortef  Duo neb  Synthroid  Melatonin  Reglan  Protonix  Depotestosterone  Sodium bicarbonate  Bactroban       Past Medical History:    Aphasia  TBI  DVT  GERD  Panhypopituitarism  Pneumonia  Seizures  Septic shock  Spastic hemiplegia  Thyroid disease  Tracheostomy care  Stroke  UTI  Ventricular fibrillation  Ventricular tachyarrhythmia  Seizures   Pancreatitis  Asthma  Cardiac arrest  SIRS  Encephalopathy  Aspiration pneumonitis      Past Surgical History:    GI endoscopy  EGD x4  Head and neck surgery  Jejunostomy tube change x4  PICC exchange  Appendectomy  Gastrotomy  Ortho surgery  Tracheostomy x2  Vascular surgery     Family History:    Cancer     Social History:  Patient presents via EMS.   Smoking Status: Former Smoker  Smokeless Tobacco: Never Used  Alcohol Use: Negative   Drug Use: Negative  PCP: Carlos Gomez   Marital Status:  Single      Review of Systems   Unable to perform ROS: Patient nonverbal       Physical Exam     Patient Vitals for the past 24 hrs:   BP Temp Temp src Pulse Heart Rate Resp SpO2 Height  Weight   01/30/20 0008 -- -- -- -- 65 16 96 % -- --   01/29/20 2233 -- -- -- -- -- -- 96 % -- --   01/29/20 2232 119/78 -- -- 66 -- -- -- -- --   01/29/20 1944 135/64 97.9  F (36.6  C) Oral -- 60 16 99 % 1.829 m (6') 74.8 kg (165 lb)       Physical Exam  VS: Reviewed per above  HENT: Mucous membranes moist  EYES: sclera anicteric  CV: Rate as noted, regular rhythm.   RESP: Effort normal. Breath sounds are normal bilaterally.  GI: no tenderness/rebound/guarding, not distended.  Gastrostomy stoma with GJ tube within the stoma but balloon outside of the body  NEURO: Alert, moving all extremities  MSK: No deformity of the extremities  SKIN: Warm and dry    Emergency Department Course   Imaging:  Radiographic findings were communicated with the family who voiced understanding of the findings.    XR Abdomen 1 view:   Contrast material injected through the left upper quadrant G-tube is within the stomach. No extraluminal contrast material, as per radiology.     Procedures:  Procedure Note:      Procedure:  Replacement of G-tube  Physician:   Jose G Jones MD  Indications for Procedure:  Previous feeding tube was inadvertently pulled out  Description of Procedure: Informed verbal consent obtained.  Site correctly identified.   The feeding tube site was patent.  The feeding was inserted using gentle pressure and slid into stomach easily.  Balloon was inflated with saline and there was no pain noted by patient.  Stomach contents could be easily aspirated and placement was confirmed with X-Ray.  The tube was therefore confirmed in position.  They tolerated the procedure well, no complications.  Tube may be used immediately.        Emergency Department Course:  Nursing notes and vitals reviewed. (2055) I performed an exam of the patient as documented above.     IV inserted. Medicine administered as documented above. Blood drawn. This was sent to the lab for further testing, results above.    The patient was sent for an  abdomen XR while in the emergency department, findings above.     (2107) I rechecked the patient.   (2155) Replacement G tube was delayed as it was never received through the tube system and had to be retrieved from IR.     (2303) I rechecked the patient and replaced the tube.     Findings and plan explained to the mother. Patient discharged home with instructions regarding supportive care, medications, and reasons to return. The importance of close follow-up was reviewed.     I personally reviewed the laboratory results with the mother and answered all related questions prior to discharge.     Impression & Plan    Medical Decision Making:  Patient presents to the ER with his home nurse and mother for evaluation of GJ tube dislodgment.  On arrival vital signs within normal limits.  On exam the dislodged GJ tube is within the gastrostomy stoma but the balloon is outside of the body.  Given lack of readily available interventional radiologist, I removed the dislodged GJ tube that had actually already fallen out but had just been replaced by his nurse in order to keep the stoma open, and I placed a G-tube of equal size according to the procedure note above.  X-ray confirmed placement in the stomach.  I placed an outpatient order for G/J-tube replacement and encouraged mother to call interventional radiology tomorrow to discuss replacement with GJ tube.  In the meantime plan to use G-tube.  Close return precautions were discussed prior to discharge.    Diagnosis:    ICD-10-CM    1. Gastrojejunostomy tube dislodgement (H) Z43.4 INT Gastro jejunostomy tube replacement       Disposition:  discharged to home with Mother    Scribe Disclosure:  Radha DYKES, am serving as a scribe on 1/29/2020 at 11:10 PM to personally document services performed by Jose G Jones MD based on my observations and the provider's statements to me.     Radha Doherty  1/29/2020    EMERGENCY DEPARTMENT       Jose G Jones MD  01/30/20  0115

## 2020-01-30 NOTE — PROGRESS NOTES
Care Suites Admission Nursing Note    Patient Information  Name: Keyon Farias  Age: 59 year old  Reason for admission: replace G tube  Care Suites arrival time: 1300    Patient Admission/Assessment   Pre-procedure assessment complete: YES  If abnormal assessment/labs, provider notified: N/A  NPO: YES  Medications held per instructions/orders: YES  Consent: obtained  Patient oriented to room: Caregivers present  Education/questions answered: YES- to caregivers  Plan/other: return to Care Suites for recovery    Discharge Planning  Accompanied by: mother and caregiver  Discharge name/phone number: [place]   Overnight post sedation caregiver: N/A  Discharge location: [place]    Nidhi Champion RN

## 2020-01-31 ENCOUNTER — APPOINTMENT (OUTPATIENT)
Dept: GENERAL RADIOLOGY | Facility: CLINIC | Age: 60
End: 2020-01-31
Attending: EMERGENCY MEDICINE
Payer: MEDICARE

## 2020-01-31 ENCOUNTER — HOSPITAL ENCOUNTER (EMERGENCY)
Facility: CLINIC | Age: 60
Discharge: HOME | End: 2020-02-01
Attending: EMERGENCY MEDICINE | Admitting: EMERGENCY MEDICINE
Payer: MEDICARE

## 2020-01-31 VITALS — TEMPERATURE: 97.5 F | RESPIRATION RATE: 18 BRPM | OXYGEN SATURATION: 98 % | HEART RATE: 87 BPM

## 2020-01-31 DIAGNOSIS — J06.9 VIRAL URI WITH COUGH: ICD-10-CM

## 2020-01-31 LAB
FLUAV+FLUBV AG SPEC QL: NEGATIVE
FLUAV+FLUBV AG SPEC QL: NEGATIVE
SPECIMEN SOURCE: NORMAL

## 2020-01-31 PROCEDURE — 99284 EMERGENCY DEPT VISIT MOD MDM: CPT | Mod: 25

## 2020-01-31 PROCEDURE — 87804 INFLUENZA ASSAY W/OPTIC: CPT | Performed by: EMERGENCY MEDICINE

## 2020-01-31 PROCEDURE — 71046 X-RAY EXAM CHEST 2 VIEWS: CPT

## 2020-01-31 NOTE — ED AVS SNAPSHOT
Emergency Department  [address] [place] [address]  Phone:  [phone]  Fax:  [phone]                                    Keyon Farias   MRN: [id number]    Department:   Emergency Department   Date of Visit:  1/31/2020           After Visit Summary Signature Page    I have received my discharge instructions, and my questions have been answered. I have discussed any challenges I see with this plan with the nurse or doctor.    ..........................................................................................................................................  Patient/Patient Representative Signature      ..........................................................................................................................................  Patient Representative Print Name and Relationship to Patient    ..................................................               ................................................  Date                                   Time    ..........................................................................................................................................  Reviewed by Signature/Title    ...................................................              ..............................................  Date                                               Time          22EPIC Rev 08/18

## 2020-02-01 NOTE — DISCHARGE INSTRUCTIONS
Discharge Instructions  Upper Respiratory Infection    The upper respiratory tract includes the sinuses, nasal passages, pharynx, and larynx. A URI, or upper respiratory infection, is an infection of any of the parts of the upper airway. Symptoms include runny nose, congestion, sneezing, sore throat, cough, and fever. URIs are almost always caused by a virus. Antibiotics do not help with viral infections, so are generally not prescribed. A URI is very contagious through coughing and nasal secretions; make sure you wash your hands often and clean surfaces after sneezing, coughing or touching them. While you should start to improve in 3 - 5 days, remember that sometimes a cough can linger for several weeks.    Generally, every Emergency Department visit should have a follow-up clinic visit with either a primary or a specialty clinic/provider. Please follow-up as instructed by your emergency provider today.    Return to the Emergency Department if:  Any of your symptoms get much worse.  You seem very sick, like being too weak to get up.  You have chest pain or shortness of breath.   You have a severe headache.  You are vomiting (throwing up) so much you cannot keep fluids or medicines down.  You have confusion or seem unusually drowsy.  You have a seizure.    What can I do to help myself?  Fill any prescriptions the provider gave you and take them right away  If you have a fever, get plenty of rest and drink lots of fluids, especially water.  Using a humidifier or saline nose spray will also help loosen mucous.   Clothes or blankets will not change your fever. Do what is comfortable for you.  Bathing or sponging in lukewarm water may help you feel better.  Acetaminophen (Tylenol ) or ibuprofen (Advil , Motrin ) will help bring fever down and may help you feel more comfortable. Be sure to read and follow the package directions, and ask your provider if you have questions.  Do not drink alcohol.  Decongestants may help  you feel better. You may use decongestant nose sprays Afrin  (oxymetazoline) or Sin-Synephrine  (phenylephrine hydrochloride) for up to 3 days, or may use a decongestant tablet like Sudafed  (pseudoephedrine).  If you were given a prescription for medicine here today, be sure to read all of the information (including the package insert) that comes with your prescription.  This will include important information about the medicine, its side effects, and any warnings that you need to know about.  The pharmacist who fills the prescription can provide more information and answer questions you may have about the medicine.  If you have questions or concerns that the pharmacist cannot address, please call or return to the Emergency Department.   Remember that you can always come back to the Emergency Department if you are not able to see your regular provider in the amount of time listed above, if you get any new symptoms, or if there is anything that worries you.

## 2020-02-01 NOTE — ED PROVIDER NOTES
History     Chief Complaint:  Fever and Cough       History limited by: patient is nonverbal.      Keyon Farias is a 59 year old male with a history of aspiration pneumonia who presents via EMS with fever and cough. Per EMS, the patient's mother noticed an elevated temperature at home along with increased work of breathing today. The mother states that the patient has had a dry cough for the last several days.     Allergies:  Dilantin  Valproic Acid     Medications:    Mucomyst  Augmentin  Albuterol  Aspirin 81 mg  Briviact  Tegretol  Iron  Cortef  Duo neb  Synthroid  Melatonin  Reglan  Protonix  Depotestosterone  Sodium bicarbonate  Bactroban       Past Medical History:    Aphasia  TBI  DVT  GERD  Panhypopituitarism  Pneumonia  Seizures  Septic shock  Spastic hemiplegia  Thyroid disease  Tracheostomy care  Stroke  UTI  Ventricular fibrillation  Ventricular tachyarrhythmia  Seizures   Pancreatitis  Asthma  Cardiac arrest  SIRS  Encephalopathy  Aspiration pneumonitis      Past Surgical History:    GI endoscopy  EGD x4  Head and neck surgery  Jejunostomy tube change x4  PICC exchange  Appendectomy  Gastrotomy  Ortho surgery  Tracheostomy x2  Vascular surgery     Family History:    Cancer     Social History:  Patient presents via EMS.   Smoking Status: Former Smoker  Smokeless Tobacco: Never Used  Alcohol Use: Negative   Drug Use: Negative  PCP: Carlos Gomez   Marital Status:  Single      Review of Systems   Unable to perform ROS: Patient nonverbal       Physical Exam     Patient Vitals for the past 24 hrs:   Temp Temp src Pulse Resp SpO2   01/31/20 2258 97.5  F (36.4  C) Tympanic 87 18 98 %        Physical Exam    Constitutional: The patient is oriented to person, place, and time.   HENT:   Head: Atraumatic  Right Ear: Normal  Left Ear: Normal  Nose: Nose normal.   Mouth/Throat: Oropharynx is clear and moist. No erythema or exudate.   Eyes: Conjunctivae and EOM are normal. Pupils are equal, round, and reactive to  light. No discharge  Neck: Normal range of motion. Neck supple.   Cardiovascular: Normal rate, regular rhythm, no murmur gallops or rubs. Intact distal pulses.    Pulmonary/Chest: CTA bilaterally. No wheezes rale. Upper airway rhonchi   Abdominal: Soft. Non tender.  No masses NG tube in place   Musculoskeletal: No edema. No bony deformity. Normal range of motion  Lymphadenopathy:     The patient has no cervical adenopathy.   Neurological: The patient is alert and oriented to person, place, and time. The patient has normal strength and normal reflexes. No cranial nerve deficit. Coordination normal.  Skin: Skin is warm and dry. No rash noted. The patient is not diaphoretic.   Psychiatric: The patient has a normal mood and affect.  : Lerma tube in place    Emergency Department Course     Imaging:  Radiology findings were communicated with the mother who voiced understanding of the findings.    XR Chest 2 Views  Final Result  IMPRESSION: Mild right basilar atelectasis or scarring.  Reading per radiology       Laboratory:  Laboratory findings were communicated with the mother who voiced understanding of the findings.    Influenza A/B antigen: negative     Emergency Department Course:  Past medical records, nursing notes, and vitals reviewed.    2054 I performed an exam of the patient as documented above.    The patient was sent for a chest xray while in the emergency department, results above.       2345 Patient rechecked and mother updated.       Findings and plan explained to the mother. Patient discharged home with instructions regarding supportive care, medications, and reasons to return. The importance of close follow-up was reviewed.     Impression & Plan     Medical Decision Making:  Keyon Farias is a 59 year old male who presents to the emergency department today by EMS because of concerns of aspiration pneumonia as he has had a persistent cough for the last several days. The mother noted a low grade fever  today. The patient has a history of TBI and unable to give any signficant history but is pleasant and alert. He has a dry cough here in the emergency room. He has some upper airway rhonchi which cleared with coughing. The remainder of his exam is unremarkable. He is afebrile here. Chest xray does not show any evidence of pneumonia and influenza swab was negative. In light of this is maybe simple viral URI. Discussed with mother using cough drops and using OTC cough medications. He may continue to use regular medications. Return if he should develop significant fever over 101 or increased difficulty breathing.        Discharge Diagnosis:    ICD-10-CM    1. Viral URI with cough J06.9     B97.89        Disposition:  The patient is discharged to home.    Scribe Disclosure:  Yan DYKES, am serving as a scribe at 10:54 PM on 1/31/2020 to document services personally performed by Donald Deshpande MD based on my observations and the provider's statements to me.      1/31/2020   Donald Deshpande MD Jones, Richard B, MD  02/01/20 0618

## 2020-02-01 NOTE — ED TRIAGE NOTES
Pt presents to ED via EMS from home where mom noted an elevated temp, non-productive cough, and increased work of breathing.  EMS reports that pt was afebrile for them.  Mom on way in.

## 2020-02-09 ENCOUNTER — APPOINTMENT (OUTPATIENT)
Dept: GENERAL RADIOLOGY | Facility: CLINIC | Age: 60
DRG: 871 | End: 2020-02-09
Attending: EMERGENCY MEDICINE
Payer: MEDICARE

## 2020-02-09 ENCOUNTER — HOSPITAL ENCOUNTER (INPATIENT)
Facility: CLINIC | Age: 60
LOS: 3 days | Discharge: HOME HEALTH | DRG: 871 | End: 2020-02-12
Attending: EMERGENCY MEDICINE | Admitting: INTERNAL MEDICINE
Payer: MEDICARE

## 2020-02-09 DIAGNOSIS — J69.0 ASPIRATION PNEUMONIA OF BOTH LOWER LOBES DUE TO GASTRIC SECRETIONS (H): Primary | ICD-10-CM

## 2020-02-09 DIAGNOSIS — A41.9 SEPSIS WITHOUT ACUTE ORGAN DYSFUNCTION, DUE TO UNSPECIFIED ORGANISM (H): ICD-10-CM

## 2020-02-09 DIAGNOSIS — J18.9 PNEUMONIA OF RIGHT LOWER LOBE DUE TO INFECTIOUS ORGANISM: ICD-10-CM

## 2020-02-09 LAB
ALBUMIN SERPL-MCNC: 3.1 G/DL (ref 3.4–5)
ALP SERPL-CCNC: 117 U/L (ref 40–150)
ALT SERPL W P-5'-P-CCNC: 27 U/L (ref 0–70)
ANION GAP SERPL CALCULATED.3IONS-SCNC: 5 MMOL/L (ref 3–14)
AST SERPL W P-5'-P-CCNC: 17 U/L (ref 0–45)
BASOPHILS # BLD AUTO: 0.1 10E9/L (ref 0–0.2)
BASOPHILS NFR BLD AUTO: 0.5 %
BILIRUB SERPL-MCNC: 0.4 MG/DL (ref 0.2–1.3)
BUN SERPL-MCNC: 14 MG/DL (ref 7–30)
CALCIUM SERPL-MCNC: 8.6 MG/DL (ref 8.5–10.1)
CHLORIDE SERPL-SCNC: 101 MMOL/L (ref 94–109)
CO2 SERPL-SCNC: 26 MMOL/L (ref 20–32)
CREAT SERPL-MCNC: 0.7 MG/DL (ref 0.66–1.25)
DIFFERENTIAL METHOD BLD: ABNORMAL
EOSINOPHIL # BLD AUTO: 0.4 10E9/L (ref 0–0.7)
EOSINOPHIL NFR BLD AUTO: 2.1 %
ERYTHROCYTE [DISTWIDTH] IN BLOOD BY AUTOMATED COUNT: 16.3 % (ref 10–15)
FLUAV+FLUBV AG SPEC QL: NEGATIVE
FLUAV+FLUBV AG SPEC QL: NEGATIVE
GFR SERPL CREATININE-BSD FRML MDRD: >90 ML/MIN/{1.73_M2}
GLUCOSE SERPL-MCNC: 117 MG/DL (ref 70–99)
HCT VFR BLD AUTO: 40.1 % (ref 40–53)
HGB BLD-MCNC: 13.4 G/DL (ref 13.3–17.7)
IMM GRANULOCYTES # BLD: 0.1 10E9/L (ref 0–0.4)
IMM GRANULOCYTES NFR BLD: 0.3 %
LACTATE BLD-SCNC: 1.1 MMOL/L (ref 0.7–2)
LYMPHOCYTES # BLD AUTO: 2.8 10E9/L (ref 0.8–5.3)
LYMPHOCYTES NFR BLD AUTO: 15.8 %
MCH RBC QN AUTO: 27.9 PG (ref 26.5–33)
MCHC RBC AUTO-ENTMCNC: 33.4 G/DL (ref 31.5–36.5)
MCV RBC AUTO: 83 FL (ref 78–100)
MONOCYTES # BLD AUTO: 1.1 10E9/L (ref 0–1.3)
MONOCYTES NFR BLD AUTO: 6.4 %
NEUTROPHILS # BLD AUTO: 13.2 10E9/L (ref 1.6–8.3)
NEUTROPHILS NFR BLD AUTO: 74.9 %
NRBC # BLD AUTO: 0 10*3/UL
NRBC BLD AUTO-RTO: 0 /100
PLATELET # BLD AUTO: 193 10E9/L (ref 150–450)
POTASSIUM SERPL-SCNC: 4.4 MMOL/L (ref 3.4–5.3)
PROCALCITONIN SERPL-MCNC: 0.08 NG/ML
PROT SERPL-MCNC: 8.3 G/DL (ref 6.8–8.8)
RBC # BLD AUTO: 4.81 10E12/L (ref 4.4–5.9)
SODIUM SERPL-SCNC: 132 MMOL/L (ref 133–144)
SPECIMEN SOURCE: NORMAL
WBC # BLD AUTO: 17.6 10E9/L (ref 4–11)

## 2020-02-09 PROCEDURE — 84145 PROCALCITONIN (PCT): CPT | Performed by: INTERNAL MEDICINE

## 2020-02-09 PROCEDURE — 80053 COMPREHEN METABOLIC PANEL: CPT | Performed by: EMERGENCY MEDICINE

## 2020-02-09 PROCEDURE — 12000000 ZZH R&B MED SURG/OB

## 2020-02-09 PROCEDURE — 87040 BLOOD CULTURE FOR BACTERIA: CPT | Performed by: EMERGENCY MEDICINE

## 2020-02-09 PROCEDURE — 87186 SC STD MICRODIL/AGAR DIL: CPT | Performed by: EMERGENCY MEDICINE

## 2020-02-09 PROCEDURE — 71046 X-RAY EXAM CHEST 2 VIEWS: CPT

## 2020-02-09 PROCEDURE — 84145 PROCALCITONIN (PCT): CPT | Performed by: EMERGENCY MEDICINE

## 2020-02-09 PROCEDURE — 87804 INFLUENZA ASSAY W/OPTIC: CPT | Performed by: EMERGENCY MEDICINE

## 2020-02-09 PROCEDURE — 99223 1ST HOSP IP/OBS HIGH 75: CPT | Mod: AI | Performed by: INTERNAL MEDICINE

## 2020-02-09 PROCEDURE — 25000128 H RX IP 250 OP 636: Performed by: EMERGENCY MEDICINE

## 2020-02-09 PROCEDURE — 27210429 ZZH NUTRITION PRODUCT INTERMEDIATE LITER

## 2020-02-09 PROCEDURE — 87181 SC STD AGAR DILUTION PER AGT: CPT | Performed by: EMERGENCY MEDICINE

## 2020-02-09 PROCEDURE — 87800 DETECT AGNT MULT DNA DIREC: CPT | Performed by: EMERGENCY MEDICINE

## 2020-02-09 PROCEDURE — 87077 CULTURE AEROBIC IDENTIFY: CPT | Performed by: EMERGENCY MEDICINE

## 2020-02-09 PROCEDURE — 36415 COLL VENOUS BLD VENIPUNCTURE: CPT

## 2020-02-09 PROCEDURE — 85025 COMPLETE CBC W/AUTO DIFF WBC: CPT | Performed by: EMERGENCY MEDICINE

## 2020-02-09 PROCEDURE — 83605 ASSAY OF LACTIC ACID: CPT | Performed by: EMERGENCY MEDICINE

## 2020-02-09 PROCEDURE — 99285 EMERGENCY DEPT VISIT HI MDM: CPT | Mod: 25

## 2020-02-09 PROCEDURE — 96365 THER/PROPH/DIAG IV INF INIT: CPT

## 2020-02-09 RX ORDER — PIPERACILLIN SODIUM, TAZOBACTAM SODIUM 4; .5 G/20ML; G/20ML
4.5 INJECTION, POWDER, LYOPHILIZED, FOR SOLUTION INTRAVENOUS ONCE
Status: COMPLETED | OUTPATIENT
Start: 2020-02-09 | End: 2020-02-09

## 2020-02-09 RX ADMIN — PIPERACILLIN AND TAZOBACTAM 4.5 G: 4; .5 INJECTION, POWDER, FOR SOLUTION INTRAVENOUS at 22:36

## 2020-02-10 LAB
ANION GAP SERPL CALCULATED.3IONS-SCNC: 5 MMOL/L (ref 3–14)
BUN SERPL-MCNC: 16 MG/DL (ref 7–30)
CALCIUM SERPL-MCNC: 8.7 MG/DL (ref 8.5–10.1)
CHLORIDE SERPL-SCNC: 104 MMOL/L (ref 94–109)
CO2 SERPL-SCNC: 27 MMOL/L (ref 20–32)
CREAT SERPL-MCNC: 0.94 MG/DL (ref 0.66–1.25)
ERYTHROCYTE [DISTWIDTH] IN BLOOD BY AUTOMATED COUNT: 16.3 % (ref 10–15)
GFR SERPL CREATININE-BSD FRML MDRD: 90 ML/MIN/{1.73_M2}
GLUCOSE SERPL-MCNC: 94 MG/DL (ref 70–99)
HCT VFR BLD AUTO: 40.3 % (ref 40–53)
HGB BLD-MCNC: 13.2 G/DL (ref 13.3–17.7)
MCH RBC QN AUTO: 27.5 PG (ref 26.5–33)
MCHC RBC AUTO-ENTMCNC: 32.8 G/DL (ref 31.5–36.5)
MCV RBC AUTO: 84 FL (ref 78–100)
PLATELET # BLD AUTO: 183 10E9/L (ref 150–450)
POTASSIUM SERPL-SCNC: 4 MMOL/L (ref 3.4–5.3)
RBC # BLD AUTO: 4.8 10E12/L (ref 4.4–5.9)
SODIUM SERPL-SCNC: 136 MMOL/L (ref 133–144)
WBC # BLD AUTO: 13.1 10E9/L (ref 4–11)

## 2020-02-10 PROCEDURE — 36415 COLL VENOUS BLD VENIPUNCTURE: CPT | Performed by: INTERNAL MEDICINE

## 2020-02-10 PROCEDURE — 85027 COMPLETE CBC AUTOMATED: CPT | Performed by: INTERNAL MEDICINE

## 2020-02-10 PROCEDURE — 40000275 ZZH STATISTIC RCP TIME EA 10 MIN

## 2020-02-10 PROCEDURE — 94640 AIRWAY INHALATION TREATMENT: CPT | Mod: 76

## 2020-02-10 PROCEDURE — 12000000 ZZH R&B MED SURG/OB

## 2020-02-10 PROCEDURE — 80048 BASIC METABOLIC PNL TOTAL CA: CPT | Performed by: INTERNAL MEDICINE

## 2020-02-10 PROCEDURE — 25000125 ZZHC RX 250: Performed by: INTERNAL MEDICINE

## 2020-02-10 PROCEDURE — 25800030 ZZH RX IP 258 OP 636: Performed by: INTERNAL MEDICINE

## 2020-02-10 PROCEDURE — 25000128 H RX IP 250 OP 636: Performed by: INTERNAL MEDICINE

## 2020-02-10 PROCEDURE — 94640 AIRWAY INHALATION TREATMENT: CPT

## 2020-02-10 PROCEDURE — 99232 SBSQ HOSP IP/OBS MODERATE 35: CPT | Performed by: INTERNAL MEDICINE

## 2020-02-10 PROCEDURE — 25000132 ZZH RX MED GY IP 250 OP 250 PS 637: Mod: GY | Performed by: INTERNAL MEDICINE

## 2020-02-10 RX ORDER — SODIUM CHLORIDE 9 MG/ML
INJECTION, SOLUTION INTRAVENOUS CONTINUOUS
Status: ACTIVE | OUTPATIENT
Start: 2020-02-10 | End: 2020-02-11

## 2020-02-10 RX ORDER — DEXTROSE MONOHYDRATE 100 MG/ML
INJECTION, SOLUTION INTRAVENOUS CONTINUOUS PRN
Status: DISCONTINUED | OUTPATIENT
Start: 2020-02-10 | End: 2020-02-12 | Stop reason: HOSPADM

## 2020-02-10 RX ORDER — PROCHLORPERAZINE 25 MG
25 SUPPOSITORY, RECTAL RECTAL EVERY 12 HOURS PRN
Status: DISCONTINUED | OUTPATIENT
Start: 2020-02-10 | End: 2020-02-12 | Stop reason: HOSPADM

## 2020-02-10 RX ORDER — ALBUTEROL SULFATE 0.83 MG/ML
2.5 SOLUTION RESPIRATORY (INHALATION)
Status: DISCONTINUED | OUTPATIENT
Start: 2020-02-10 | End: 2020-02-12 | Stop reason: HOSPADM

## 2020-02-10 RX ORDER — PIPERACILLIN SODIUM, TAZOBACTAM SODIUM 4; .5 G/20ML; G/20ML
4.5 INJECTION, POWDER, LYOPHILIZED, FOR SOLUTION INTRAVENOUS EVERY 6 HOURS
Status: DISCONTINUED | OUTPATIENT
Start: 2020-02-10 | End: 2020-02-12 | Stop reason: HOSPADM

## 2020-02-10 RX ORDER — CHOLECALCIFEROL (VITAMIN D3) 50 MCG
2000 TABLET ORAL DAILY
Status: DISCONTINUED | OUTPATIENT
Start: 2020-02-10 | End: 2020-02-11

## 2020-02-10 RX ORDER — ACETYLCYSTEINE 200 MG/ML
2 SOLUTION ORAL; RESPIRATORY (INHALATION) 4 TIMES DAILY
Status: DISCONTINUED | OUTPATIENT
Start: 2020-02-10 | End: 2020-02-12 | Stop reason: HOSPADM

## 2020-02-10 RX ORDER — GUAR GUM
1 PACKET (EA) ORAL DAILY
Status: DISCONTINUED | OUTPATIENT
Start: 2020-02-10 | End: 2020-02-12 | Stop reason: HOSPADM

## 2020-02-10 RX ORDER — ACETAMINOPHEN 650 MG/1
650 SUPPOSITORY RECTAL EVERY 4 HOURS PRN
Status: DISCONTINUED | OUTPATIENT
Start: 2020-02-10 | End: 2020-02-12 | Stop reason: HOSPADM

## 2020-02-10 RX ORDER — ONDANSETRON 2 MG/ML
4 INJECTION INTRAMUSCULAR; INTRAVENOUS EVERY 6 HOURS PRN
Status: DISCONTINUED | OUTPATIENT
Start: 2020-02-10 | End: 2020-02-12 | Stop reason: HOSPADM

## 2020-02-10 RX ORDER — ACETAMINOPHEN 325 MG/1
650 TABLET ORAL EVERY 4 HOURS PRN
Status: DISCONTINUED | OUTPATIENT
Start: 2020-02-10 | End: 2020-02-12 | Stop reason: HOSPADM

## 2020-02-10 RX ORDER — HYDROCORTISONE 10 MG/1
10 TABLET ORAL
Status: DISCONTINUED | OUTPATIENT
Start: 2020-02-10 | End: 2020-02-12 | Stop reason: HOSPADM

## 2020-02-10 RX ORDER — PROCHLORPERAZINE MALEATE 5 MG
10 TABLET ORAL EVERY 6 HOURS PRN
Status: DISCONTINUED | OUTPATIENT
Start: 2020-02-10 | End: 2020-02-12 | Stop reason: HOSPADM

## 2020-02-10 RX ORDER — BENZOCAINE/MENTHOL 6 MG-10 MG
LOZENGE MUCOUS MEMBRANE 2 TIMES DAILY PRN
Status: DISCONTINUED | OUTPATIENT
Start: 2020-02-10 | End: 2020-02-12 | Stop reason: HOSPADM

## 2020-02-10 RX ORDER — ASPIRIN 81 MG/1
81 TABLET, CHEWABLE ORAL DAILY
Status: DISCONTINUED | OUTPATIENT
Start: 2020-02-10 | End: 2020-02-12 | Stop reason: HOSPADM

## 2020-02-10 RX ORDER — METOCLOPRAMIDE HYDROCHLORIDE 5 MG/5ML
5 SOLUTION ORAL 2 TIMES DAILY
Status: DISCONTINUED | OUTPATIENT
Start: 2020-02-10 | End: 2020-02-12 | Stop reason: HOSPADM

## 2020-02-10 RX ORDER — SCOPOLAMINE HYDROBROMIDE
0.8 POWDER (GRAM) MISCELLANEOUS 3 TIMES DAILY
Status: DISCONTINUED | OUTPATIENT
Start: 2020-02-10 | End: 2020-02-12 | Stop reason: HOSPADM

## 2020-02-10 RX ORDER — ASCORBIC ACID 500 MG
1000 TABLET ORAL DAILY
Status: DISCONTINUED | OUTPATIENT
Start: 2020-02-10 | End: 2020-02-12 | Stop reason: HOSPADM

## 2020-02-10 RX ORDER — ONDANSETRON 4 MG/1
4 TABLET, ORALLY DISINTEGRATING ORAL EVERY 6 HOURS PRN
Status: DISCONTINUED | OUTPATIENT
Start: 2020-02-10 | End: 2020-02-12 | Stop reason: HOSPADM

## 2020-02-10 RX ORDER — NALOXONE HYDROCHLORIDE 0.4 MG/ML
.1-.4 INJECTION, SOLUTION INTRAMUSCULAR; INTRAVENOUS; SUBCUTANEOUS
Status: DISCONTINUED | OUTPATIENT
Start: 2020-02-10 | End: 2020-02-12 | Stop reason: HOSPADM

## 2020-02-10 RX ORDER — CARBAMAZEPINE 100 MG/5ML
150 SUSPENSION ORAL EVERY 6 HOURS
Status: DISCONTINUED | OUTPATIENT
Start: 2020-02-10 | End: 2020-02-12 | Stop reason: HOSPADM

## 2020-02-10 RX ORDER — LIDOCAINE 40 MG/G
CREAM TOPICAL
Status: DISCONTINUED | OUTPATIENT
Start: 2020-02-10 | End: 2020-02-12 | Stop reason: HOSPADM

## 2020-02-10 RX ORDER — HYDROCORTISONE 20 MG/1
20 TABLET ORAL EVERY MORNING
Status: DISCONTINUED | OUTPATIENT
Start: 2020-02-10 | End: 2020-02-12 | Stop reason: HOSPADM

## 2020-02-10 RX ORDER — LEVOTHYROXINE SODIUM 150 UG/1
150 TABLET ORAL EVERY MORNING
Status: DISCONTINUED | OUTPATIENT
Start: 2020-02-10 | End: 2020-02-11

## 2020-02-10 RX ORDER — SODIUM BICARBONATE 650 MG/1
650 TABLET ORAL DAILY
Status: DISCONTINUED | OUTPATIENT
Start: 2020-02-10 | End: 2020-02-12 | Stop reason: HOSPADM

## 2020-02-10 RX ADMIN — CHOLECALCIFEROL TAB 50 MCG (2000 UNIT) 2000 UNITS: 50 TAB at 08:50

## 2020-02-10 RX ADMIN — ASPIRIN 81 MG 81 MG: 81 TABLET ORAL at 08:50

## 2020-02-10 RX ADMIN — CARBAMAZEPINE 150 MG: 100 SUSPENSION ORAL at 14:29

## 2020-02-10 RX ADMIN — ALBUTEROL SULFATE 2.5 MG: 2.5 SOLUTION RESPIRATORY (INHALATION) at 19:40

## 2020-02-10 RX ADMIN — PIPERACILLIN AND TAZOBACTAM 4.5 G: 4; .5 INJECTION, POWDER, FOR SOLUTION INTRAVENOUS at 22:06

## 2020-02-10 RX ADMIN — SODIUM CHLORIDE: 9 INJECTION, SOLUTION INTRAVENOUS at 17:36

## 2020-02-10 RX ADMIN — METOCLOPRAMIDE 5 MG: 5 SOLUTION ORAL at 08:51

## 2020-02-10 RX ADMIN — ALBUTEROL SULFATE 2.5 MG: 2.5 SOLUTION RESPIRATORY (INHALATION) at 06:51

## 2020-02-10 RX ADMIN — METOCLOPRAMIDE 5 MG: 5 SOLUTION ORAL at 21:51

## 2020-02-10 RX ADMIN — ACETYLCYSTEINE 2 ML: 200 SOLUTION ORAL; RESPIRATORY (INHALATION) at 15:23

## 2020-02-10 RX ADMIN — OXYCODONE HYDROCHLORIDE AND ACETAMINOPHEN 1000 MG: 500 TABLET ORAL at 08:49

## 2020-02-10 RX ADMIN — Medication 0.8 MG: at 21:50

## 2020-02-10 RX ADMIN — SODIUM BICARBONATE 650 MG TABLET 650 MG: at 08:49

## 2020-02-10 RX ADMIN — HYDROCORTISONE 20 MG: 20 TABLET ORAL at 08:50

## 2020-02-10 RX ADMIN — ACETYLCYSTEINE 2 ML: 200 SOLUTION ORAL; RESPIRATORY (INHALATION) at 06:51

## 2020-02-10 RX ADMIN — POTASSIUM & SODIUM PHOSPHATES POWDER PACK 280-160-250 MG 1 PACKET: 280-160-250 PACK at 21:51

## 2020-02-10 RX ADMIN — CARBAMAZEPINE 150 MG: 100 SUSPENSION ORAL at 00:52

## 2020-02-10 RX ADMIN — PIPERACILLIN AND TAZOBACTAM 4.5 G: 4; .5 INJECTION, POWDER, FOR SOLUTION INTRAVENOUS at 12:13

## 2020-02-10 RX ADMIN — CARBAMAZEPINE 150 MG: 100 SUSPENSION ORAL at 18:14

## 2020-02-10 RX ADMIN — CARBAMAZEPINE 150 MG: 100 SUSPENSION ORAL at 06:08

## 2020-02-10 RX ADMIN — HYDROCORTISONE 10 MG: 10 TABLET ORAL at 16:11

## 2020-02-10 RX ADMIN — METOCLOPRAMIDE 5 MG: 5 SOLUTION ORAL at 00:51

## 2020-02-10 RX ADMIN — POTASSIUM & SODIUM PHOSPHATES POWDER PACK 280-160-250 MG 1 PACKET: 280-160-250 PACK at 08:49

## 2020-02-10 RX ADMIN — ALBUTEROL SULFATE 2.5 MG: 2.5 SOLUTION RESPIRATORY (INHALATION) at 15:23

## 2020-02-10 RX ADMIN — BRIVARACETAM 100 MG: 10 SOLUTION ORAL at 00:52

## 2020-02-10 RX ADMIN — PIPERACILLIN AND TAZOBACTAM 4.5 G: 4; .5 INJECTION, POWDER, FOR SOLUTION INTRAVENOUS at 04:27

## 2020-02-10 RX ADMIN — Medication 40 MG: at 09:01

## 2020-02-10 RX ADMIN — BRIVARACETAM 100 MG: 10 SOLUTION ORAL at 08:51

## 2020-02-10 RX ADMIN — MINERAL SUPPLEMENT IRON 300 MG / 5 ML STRENGTH LIQUID 100 PER BOX UNFLAVORED 220 MG: at 09:01

## 2020-02-10 RX ADMIN — BRIVARACETAM 100 MG: 10 SOLUTION ORAL at 21:50

## 2020-02-10 RX ADMIN — PIPERACILLIN AND TAZOBACTAM 4.5 G: 4; .5 INJECTION, POWDER, FOR SOLUTION INTRAVENOUS at 16:12

## 2020-02-10 RX ADMIN — ALBUTEROL SULFATE 2.5 MG: 2.5 SOLUTION RESPIRATORY (INHALATION) at 10:39

## 2020-02-10 RX ADMIN — POTASSIUM & SODIUM PHOSPHATES POWDER PACK 280-160-250 MG 1 PACKET: 280-160-250 PACK at 16:11

## 2020-02-10 RX ADMIN — ACETYLCYSTEINE 2 ML: 200 SOLUTION ORAL; RESPIRATORY (INHALATION) at 10:39

## 2020-02-10 RX ADMIN — ACETYLCYSTEINE 2 ML: 200 SOLUTION ORAL; RESPIRATORY (INHALATION) at 19:40

## 2020-02-10 RX ADMIN — LEVOTHYROXINE SODIUM 150 MCG: 150 TABLET ORAL at 08:50

## 2020-02-10 ASSESSMENT — ACTIVITIES OF DAILY LIVING (ADL)
ADLS_ACUITY_SCORE: 41
RETIRED_EATING: 4-->COMPLETELY DEPENDENT
RETIRED_COMMUNICATION: 3-->UNABLE TO SPEAK (NOT RELATED TO LANGUAGE BARRIER)
ADLS_ACUITY_SCORE: 41
DRESS: 4-->COMPLETELY DEPENDENT
COGNITION: 2 - DIFFICULTY WITH ORGANIZING THOUGHTS
ADLS_ACUITY_SCORE: 41
SWALLOWING: 2-->DIFFICULTY SWALLOWING LIQUIDS/FOODS
BATHING: 4-->COMPLETELY DEPENDENT
FALL_HISTORY_WITHIN_LAST_SIX_MONTHS: NO
TOILETING: 4-->COMPLETELY DEPENDENT
AMBULATION: 3-->ASSISTIVE EQUIPMENT AND PERSON
ADLS_ACUITY_SCORE: 41
ADLS_ACUITY_SCORE: 41
TRANSFERRING: 3-->ASSISTIVE EQUIPMENT AND PERSON

## 2020-02-10 NOTE — PROGRESS NOTES
RECEIVING UNIT ED HANDOFF REVIEW    ED Nurse Handoff Report was reviewed by: Danyelle Albrecht RN on February 9, 2020 at 11:23 PM

## 2020-02-10 NOTE — ED NOTES
Bed: ED21  Expected date:   Expected time:   Means of arrival:   Comments:  E1  59M Possible fever  2005

## 2020-02-10 NOTE — H&P
[facility]    History and Physical - Hospitalist Service       Date of Admission:  2/9/2020    Assessment & Plan   Keyon Farias is a 57 year old male with PMHx of TBI nonverbal at baseline with chronic  R sided spastic hemiplegia, dysphagia with G-tube for TFs/meds, seizure disorder, panhypopituitarism and frequent hospital admissions for recurrent pneumonia who was admitted on 2/9/2020 for management of recurrent aspiration pneumonia.    Recurrent Aspiration Pneumonia  Hospitalized on 21 occasions in the calendar year 2019, most of which were related to pneumonia. Had been doing well in recent weeks, which his mom attributes to brushing teeth and oral cavity with alcohol mouthrinse TID. Sounds as though his home RN, who was recently sick, hasn't been keeping up with this in recent days. 3 days prior to admission, his mother noticed patient developed a cough and congestion. Today he had a low grade fever at 100.4. Brought to ED for further evaluation. Was afebrile and hemodynamically stable. O2 sats in 90s on RA. WBC 17.6. Lactate nl. Procal 0.08. CXR showed a new R basilar infiltrate. Started on Zosyn for presumed aspiration pneumonia.   -- cont Zosyn, anticipate transition to oral abx in next 1-2d pending continued improvement  -- cont frequent oral cares  -- cont mucomyst and albuterol nebs as per prior to admission    TBI  Seizure disorder secondary to TBI  With spastic hemiplegia, dysphagia with recurrent aspiration. Has feeding tube.  -- cont TFs  -- cont seizure meds per home regimen  -- cont other routine skin cares per routine given hx of pressure injuries and skin breakdown    Panhypopituitarism  Chronic and stable. Maintained on hydrocortisone, levothyroxine and testosterone.   -- cont hydrocortisone and levothyroxine       FEN: NPO, cont TFs via Gtube, lytes stable, no need for IVFs  DVT Prophylaxis: PCDs  Lerma Catheter: not present  Code Status: Full code    Disposition Plan   Expected  discharge: 2 - 3 days, recommended to prior living arrangement.     The patient's care was discussed with the patient's mother at bedside this evening.    Sun Keita, DO  [facility]    ______________________________________________________________________    Chief Complaint   Cough, fever    History was obtained per patient's mother who is his caregiver    History of Present Illness   Keyon Farias is a 57 year old male with PMHx of TBI nonverbal at baseline with chronic  R sided spastic hemiplegia, dysphagia with G-tube for TFs/meds, seizure disorder, panhypopituitarism and frequent hospital admissions for recurrent pneumonia who was admitted on 2/9/2020 for management of recurrent aspiration pneumonia.    Keyon was hospitalized on 21 occasions in the calendar year 2019, most of which were related to pneumonia. Had been doing well in recent weeks, which his mom attributes to brushing teeth and oral cavity with alcohol mouthrinse TID. Sounds as though his home RN, who was recently sick, hasn't been keeping up with this in recent days. 3 days prior to admission, his mother noticed patient developed a cough and congestion. Today he had a low grade fever at 100.4. Brought to ED for further evaluation. Was afebrile and hemodynamically stable. O2 sats in 90s on RA. WBC 17.6. Lactate nl. Procal 0.08. CXR showed a new R basilar infiltrate. Started on Zosyn for presumed aspiration pneumonia.     Review of Systems    The 10 point Review of Systems is negative other than noted in the HPI or here.     Past Medical History    I have reviewed this patient's medical history and updated it with pertinent information if needed.   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)     Patient has little porductive speech but at baseline can understand simple commands consistently     DVT of upper extremity (deep vein thrombosis) (H)      Gastro-oesophageal reflux  disease      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      Pneumonia      Seizures (H)     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     Septic shock (H)      Spastic hemiplegia affecting dominant side (H)     related to wil injury     Thyroid disease      Tracheostomy care (H)      Traumatic brain injury (H) 1989     Unspecified cerebral artery occlusion with cerebral infarction 1989     UTI (urinary tract infection)      Ventricular fibrillation (H)      Ventricular tachyarrhythmia (H)        Past Surgical History   I have reviewed this patient's surgical history and updated it with pertinent information if needed.  Past Surgical History:   Procedure Laterality Date     ENDOSCOPIC ULTRASOUND UPPER GASTROINTESTINAL TRACT (GI) N/A 1/30/2017    Procedure: ENDOSCOPIC ULTRASOUND, ESOPHAGOSCOPY / UPPER GASTROINTESTINAL TRACT (GI);  Surgeon: Jus Montana MD;  Location:  OR     ENDOSCOPIC ULTRASOUND, ESOPHAGOSCOPY, GASTROSCOPY, DUODENOSCOPY (EGD), NECROSECTOMY N/A 2/7/2017    Procedure: ENDOSCOPIC ULTRASOUND, ESOPHAGOSCOPY, GASTROSCOPY, DUODENOSCOPY (EGD), NECROSECTOMY;  Surgeon: Jack Marcus MD;  Location:  OR     ESOPHAGOSCOPY, GASTROSCOPY, DUODENOSCOPY (EGD), COMBINED  3/13/2014    Procedure: COMBINED ESOPHAGOSCOPY, GASTROSCOPY, DUODENOSCOPY (EGD), BIOPSY SINGLE OR MULTIPLE;  gastroscopy;  Surgeon: Digna Rhodes MD;  Location: [facility]     ESOPHAGOSCOPY, GASTROSCOPY, DUODENOSCOPY (EGD), COMBINED N/A 12/6/2016    Procedure: COMBINED ESOPHAGOSCOPY, GASTROSCOPY, DUODENOSCOPY (EGD);  Surgeon: Digna Rhodes MD;  Location: [facility]     ESOPHAGOSCOPY, GASTROSCOPY, DUODENOSCOPY (EGD), COMBINED N/A 2/7/2017    Procedure: COMBINED ENDOSCOPIC ULTRASOUND, ESOPHAGOSCOPY, GASTROSCOPY, DUODENOSCOPY (EGD), FINE NEEDLE ASPIRATE/BIOPSY;  Surgeon: Too Thakur MD;  Location:  OR     HEAD & NECK SURGERY      reconstructive facial  surgery following accident in      IR FOLLOW UP VISIT INPATIENT  2019     IR GASTRO JEJUNOSTOMY TUBE CHANGE  2018     IR GASTRO JEJUNOSTOMY TUBE CHANGE  2019     IR GASTRO JEJUNOSTOMY TUBE CHANGE  3/8/2019     IR GASTRO JEJUNOSTOMY TUBE CHANGE  2019     IR GASTRO JEJUNOSTOMY TUBE CHANGE  2020     IR GASTRO JEJUNOSTOMY TUBE CHANGE  2020     IR PICC EXCHANGE LEFT  8/15/2019     LAPAROSCOPIC APPENDECTOMY  2013    Procedure: LAPAROSCOPIC APPENDECTOMY;  LAPAROSCOPIC APPENDECTOMY;  Surgeon: Manish Pierce MD;  Location:  OR     LAPAROSCOPIC ASSISTED INSERTION TUBE GASTROTOMY N/A 2016    Procedure: LAPAROSCOPIC ASSISTED INSERTION TUBE GASTROSTOMY;  Surgeon: Manish Pierce MD;  Location:  OR     ORTHOPEDIC SURGERY      right hand repair     TRACHEOSTOMY N/A 9/3/2016    Procedure: TRACHEOSTOMY;  Surgeon: João Ortiz MD;  Location:  OR     TRACHEOSTOMY N/A 2016    Procedure: TRACHEOSTOMY;  Surgeon: João Ortiz MD;  Location:  OR     VASCULAR SURGERY         Social History   I have reviewed this patient's social history and updated it with pertinent information if needed.  Social History     Tobacco Use     Smoking status: Former Smoker     Last attempt to quit: 1989     Years since quittin.8     Smokeless tobacco: Never Used   Substance Use Topics     Alcohol use: No     Drug use: No       Family History   I have reviewed this patient's family history and updated it with pertinent information if needed.   Family History   Problem Relation Age of Onset     Cancer Father        Prior to Admission Medications   Prior to Admission Medications   Prescriptions Last Dose Informant Patient Reported? Taking?   Bioflavonoid Products (VITAMIN C PLUS) 1000 MG TABS  Mother Yes No   Si tablet by Oral or FT or NG tube route   Brivaracetam (BRIVIACT) 10 MG/ML solution  Mother Yes No   Si mg by Oral or Feeding Tube route 2 times daily  0900, 2100   Guar Gum (FIBER MODULAR, NUTRISOURCE FIBER,) packet  Mother No No   Si packet by Per G Tube route daily   Scopolamine HBr POWD  Mother No No   Sig: Dispense #90. Mix contents with small amount of water for admin via J-tube.  Administer 0.8 mg three times each day.   Skin Protectants, Misc. (BALMEX SKIN PROTECTANT) OINT  Mother Yes No   Sig: Externally apply topically 2 times daily as needed (irritation) Applay to reddened memo areas twice daily as needed   acetylcysteine (MUCOMYST) 20 % neb solution  Mother Yes No   Sig: Take 2 mLs by nebulization 4 times daily With albuterol at 0700, 1100, 1500, and 1900    albuterol (PROVENTIL) (5 MG/ML) 0.5% neb solution  Mother Yes No   Sig: Take 2.5 mg by nebulization every 4 hours (while awake) 0700 1100 1500 1900 with mucomyst    aspirin (ASA) 81 MG chewable tablet  Mother Yes No   Si mg by Oral or Feeding Tube route daily At 0900   bacitracin ointment  Mother Yes No   Sig: Apply topically daily as needed for wound care To PEG site.    calcium carbonate 1250 (500 CA) MG/5ML SUSP suspension  Mother No No   Si mLs (1,250 mg) by Per J Tube route 3 times daily (with meals)   carBAMazepine (TEGRETOL) 100 MG/5ML suspension  Mother Yes No   Si mg by Oral or Feeding Tube route every 6 hours At 06:00, 12:00, 18:00 and 24:00 for seizures    ferrous sulfate 220 (44 Fe) MG/5ML ELIX  Mother Yes No   Si mg by Per Feeding Tube route daily   hydrocortisone (CORTEF) 5 MG tablet  Mother Yes No   Sig: 10 mg by Oral or FT or NG tube route daily (with dinner) At 1500   hydrocortisone (CORTEF) 5 MG tablet  Mother Yes No   Si mg by Oral or FT or NG tube route every morning    hydrocortisone 1 % CREA cream  Mother Yes No   Sig: Place rectally 2 times daily as needed for other Apply to reddened memo areas as needed   levothyroxine (SYNTHROID/LEVOTHROID) 150 MCG tablet  Mother Yes No   Sig: Take 150 mcg by mouth every morning   melatonin (MELATONIN) 1 MG/ML  LIQD liquid  Mother Yes No   Si mg by Per NG tube route At Bedtime    metoclopramide (REGLAN) 5 MG/5ML solution  Mother Yes No   Si mg by Per Feeding Tube route 2 times daily   miconazole (MICATIN) 2 % AERP powder  Mother Yes No   Sig: Apply topically 2 times daily as needed    mupirocin (BACTROBAN) 2 % external ointment  Mother Yes No   Sig: Apply topically 2 times daily as needed    order for DME  Mother No No   Sig: Equipment being ordered: Nebulizer   pantoprazole (PROTONIX) 2 mg/mL SUSP suspension  Mother No No   Si mLs (40 mg) by Per J Tube route daily   potassium & sodium phosphates (NEUTRA-PHOS) 280-160-250 MG Packet  Mother Yes No   Sig: Take 1 packet by mouth 2 times daily    sodium bicarbonate 650 MG tablet   No No   Sig: Take 1 tablet (650 mg) by mouth daily   testosterone cypionate (DEPOTESTOTERONE CYPIONATE) 200 MG/ML injection  Mother Yes No   Sig: Inject 76 mg into the muscle See Admin Instructions Every 2 weeks on    76 mg or 0.38 mL   vitamin D3 (CHOLECALCIFEROL) 2000 units (50 mcg) tablet  Mother Yes No   Sig: Take 2,000 Units by mouth daily Crush and feed via j-tube @@ 0900      Facility-Administered Medications: None     Allergies   Allergies   Allergen Reactions     Dilantin [Phenytoin Sodium]      Valproic Acid      Toxicity c bone marrow suspension, elevated ammonia levels        Physical Exam   Vital Signs: Temp: 98.5  F (36.9  C) Temp src: Oral BP: 127/75 Pulse: 104 Heart Rate: 104   SpO2: 95 %      Weight: 0 lbs 0 oz    Constitutional: Resting comfortably, alert and looking around room, NAD  HEENT: PERRLA, EOMI, MMM  Cardiovascular: HRRR, no MGR, no LE edema  Respiratory: coarse BS over R lung base, otherwise CTA, no wheeze/rales/rhonchi, no increased work of breathing  GI: S, NT, ND, +BS, +gtube w/o surrounding erythema  Skin: warm/dry, no rashes/lesions/jaundice  Neurologic: CNs 2-12 intact, +R sided hemiplegia    Data   Data reviewed today: I reviewed all  medications, new labs and imaging results over the last 24 hours. I personally reviewed the chest x-ray image(s) showing R basilar infiltrate.    Recent Labs   Lab 02/09/20  2100   WBC 17.6*   HGB 13.4   MCV 83      *   POTASSIUM 4.4   CHLORIDE 101   CO2 26   BUN 14   CR 0.70   ANIONGAP 5   STEVE 8.6   *   ALBUMIN 3.1*   PROTTOTAL 8.3   BILITOTAL 0.4   ALKPHOS 117   ALT 27   AST 17     Recent Results (from the past 24 hour(s))   XR Chest 2 Views    Narrative    CHEST TWO VIEW   2/9/2020 9:28 PM     HISTORY:  Cough, low grade fever.    COMPARISON: 1/31/2020 radiographs.    FINDINGS: The cardiomediastinal silhouette and pulmonary vasculature  remain within normal limits. The left lung remains clear. No effusions  or osseous changes. There is a moderate amount of new hazy opacity in  the right lung base. Elevated right diaphragm again noted. No other  change.      Impression    IMPRESSION: New right basilar infiltrate.    BARBARA CALVIN MD

## 2020-02-10 NOTE — PLAN OF CARE
DATE & TIME: 2/10/2020 AM   Cognitive Concerns/ Orientation : HECTOR orientation. Pt is nonverbal.   BEHAVIOR & AGGRESSION TOOL COLOR: Green  CIWA SCORE: na   ABNL VS/O2: Room air. Continuous oxygen monitoring.  MOBILITY: Total assist. Turn Q2H  PAIN MANAGMENT: HECTOR pain. Pt appears relaxed.  DIET: Tube feedings.  BOWEL/BLADDER: Incontinent of stool and urine.  ABNL LAB/BG: WBC- 13.1.  DRAIN/DEVICES: G tube, PIV.  TELEMETRY RHYTHM: NA  SKIN: Blanchable red wound on coccyx.  TESTS/PROCEDURES: NA  D/C DAY/GOALS/PLACE: 2-3 days back home.  OTHER IMPORTANT INFO: IV abx.

## 2020-02-10 NOTE — CONSULTS
CLINICAL NUTRITION SERVICES  -  ASSESSMENT NOTE    Recommendations Ordered by Registered Dietitian (RD):   Nutrition Support Enteral:  Type of Feeding Tube: GJ tube (feed in J-port)  Enteral Frequency:  12 hr cycle  Enteral Regimen: Isosource 1.5 @ 100 mL/hr x 12 hrs (7am-7pm)  Total Enteral Provisions: 1800 cals (24 cals/kg), 82 gm pro (1.1 gm/kg, 91% est needs), 18 gm fiber, 912 mL free water  Nutrisource fiber 1 packet daily = 15 cals, 3 gm fiber  TOTAL: 1815 cals (24 cals/kg, 97% est needs), 21 gm fiber  Free Water Flush:  120 mL every 4 hrs     OK to start TF now at 100 mL/hr (as pt runs at home)   Malnutrition:   % Weight Loss:  Up to 5% in 1 month (non-severe malnutrition)  % Intake:  Decreased intake does not meet criteria   Subcutaneous Fat Loss:  None observed  Muscle Loss:  Decreased muscle tone with hemiplegia/TBI (no acute loss)  Fluid Retention:  None noted    Malnutrition Diagnosis: No Nutrition diagnosis identified. Patient does not meet two of the above criteria.      REASON FOR ASSESSMENT  Keyon Farias is a 59 year old male seen by Registered Dietitian for Provider Order - Registered Dietitian to Assess and Order TF per Medical Nutrition Therapy Protocol    NUTRITION HISTORY  - Information obtained from Epic as patient well known to our services.  He is non-verbal at baseline and mother not present early this morning. Phoned mother to obtain current TF regimen.  - Tube feeding history  - At home TF regimen: 2 hours on, 1 hour off until 5 cans of Jevity 1.5 are administered. Provides 1770 kcal, 77 g protein, 260 g CHO, 27 g fiber and 900 mL free water. Fluid flush: 120 mL every 4 hours.  - During previous admit (January 2020), mother shared the following TF regimen: Isosource 1.5 @ 90 ml/hr x6 hrs daily:   Total 825 kcal (11 kcal/kg, 44% est energy needs), 37 g pro (0.5 g/kg, 41% protein needs), 1130 mL daily.  During call this morning, she stated the regimen was adjusted because this regimen was  "not providing enough nourishment for patient.      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     NUTRITION FOCUSED PHYSICAL ASSESSMENT FOR DIAGNOSING MALNUTRITION)  Yes    Observed:    No nutrition-related physical findings observed    Obtained from Chart/Interdisciplinary Team:  Per RN note, \"Blanchable redness to coccyx/sacrum, protective mepilex in place.\"     ANTHROPOMETRICS  Height: 6\"  Weight: 154 lbs 12.21 oz (70.2 kg)  Body mass index is 20.99 kg/m .  Weight Status:  Normal BMI  IBW: 178# (80.9 kg)  % IBW: 87%  Weight History: Wt loss of 9# (5%) in 2 weeks.   Wt Readings from Last 10 Encounters:   02/10/20 70.2 kg (154 lb 12.2 oz)   01/30/20 74.8 kg (165 lb)   01/29/20 74.8 kg (165 lb)   01/02/20 71.7 kg (158 lb)   12/25/19 75.1 kg (165 lb 9.1 oz)   12/18/19 76.7 kg (169 lb)   11/15/19 72.6 kg (160 lb)   11/11/19 72.9 kg (160 lb 12.8 oz)   10/28/19 73.6 kg (162 lb 4.1 oz)   10/13/19 68.9 kg (152 lb)       LABS  Labs reviewed    MEDICATIONS  Medications reviewed      ASSESSED NUTRITION NEEDS PER APPROVED PRACTICE GUIDELINES:    Dosing Weight 70.2 kg (admit wt)  Estimated Energy Needs: 8750-7127 kcals (25-30 Kcal/Kg)  Justification: maintenance  Estimated Protein Needs:  grams protein (1.2-1.5 g pro/Kg)  Justification: preservation of lean body mass  Estimated Fluid Needs: 5806-0171 mL (1 mL/Kcal)  Justification: maintenance    MALNUTRITION:  % Weight Loss:  Up to 5% in 1 month (non-severe malnutrition)  % Intake:  Decreased intake does not meet criteria   Subcutaneous Fat Loss:  None " observed  Muscle Loss:  Decreased muscle tone with hemiplegia/TBI (no acute loss)  Fluid Retention:  None noted    Malnutrition Diagnosis: No Nutrition diagnosis identified. Patient does not meet two of the above criteria.     NUTRITION DIAGNOSIS:  Inadequate enteral nutrition infusion related to TF on hold as evidenced by TF meeting 0% needs with plan to resume today     NUTRITION INTERVENTIONS  Recommendations / Nutrition Prescription    Nutrition Support Enteral:  Type of Feeding Tube: GJ tube (feed in J-port)  Enteral Frequency:  12 hr cycle  Enteral Regimen: Isosource 1.5 @ 100 mL/hr x 12 hrs (7am-7pm)  Total Enteral Provisions: 1800 cals (24 cals/kg), 82 gm pro (1.1 gm/kg, 91% est needs), 18 gm fiber, 912 mL free water  Nutrisource fiber 1 packet daily = 15 cals, 3 gm fiber  TOTAL: 1815 cals (24 cals/kg, 97% est needs), 21 gm fiber  Free Water Flush:  120 mL every 4 hrs     OK to start TF now at 100 mL/hr (as pt runs at home)    Implementation  Nutrition education: No education needs assessed at this time  EN Composition, EN Schedule, Feeding Tube Flush - Orders entered in Epic as above    Nutrition Goals  TF to meet % of estimated needs.   No weight loss beyond 70 kg.     MONITORING AND EVALUATION:  Progress towards goals will be monitored and evaluated per protocol and Practice Guidelines    Megan Rosales RDN, LD

## 2020-02-10 NOTE — PROGRESS NOTES
[facility]    HOSPITALIST PROGRESS NOTE :   --------------------------------------------------    Date of Admission:  2/9/2020    Cumulative Summary:  Keyon Farias is a 57 year old male with PMHx of TBI nonverbal at baseline with chronic  R sided spastic hemiplegia, dysphagia with G-tube for TFs/meds, seizure disorder, panhypopituitarism and frequent hospital admissions for recurrent pneumonia who was admitted on 2/9/2020 for management of recurrent aspiration pneumonia    Assessment & Plan     Recurrent Aspiration Pneumonia  Hospitalized on 21 occasions in the calendar year 2019, most of which were related to pneumonia. Had been doing well in recent weeks, which his mom attributes to brushing teeth and oral cavity with alcohol mouthrinse TID. Sounds as though his home RN, who was recently sick, hasn't been keeping up with this in recent days. 3 days prior to admission, his mother noticed patient developed a cough and congestion. Today he had a low grade fever at 100.4. Brought to ED for further evaluation. Was afebrile and hemodynamically stable. O2 sats in 90s on RA. WBC 17.6. Lactate nl. Procal 0.08. CXR showed a new R basilar infiltrate. Started on Zosyn for presumed aspiration pneumonia.   Seen this morning , looks slightly more tired and slightly dehydrated than his usual self     -- cont Zosyn, anticipate transition to oral abx in next 1-2d pending continued improvement  -- Will give patient one liter of normal saline   -- cont frequent oral cares  -- cont mucomyst and albuterol nebs as per prior to admission     TBI  Seizure disorder secondary to TBI  With spastic hemiplegia, dysphagia with recurrent aspiration. Has feeding tube.  -- continue tube feeds , RD is consulted   -- cont seizure meds per home regimen  -- cont other routine skin cares per routine given hx of pressure injuries and skin breakdown     Panhypopituitarism  Chronic and stable. Maintained on hydrocortisone, levothyroxine  and testosterone.   -- cont hydrocortisone and levothyroxine    Diet: NPO for Medical/Clinical Reasons Except for: Meds    Lerma Catheter: not present  DVT Prophylaxis: Pneumatic Compression Devices  Code Status: Full Code    The patient's care was discussed with the Bedside Nurse and Patient.    Disposition Plan   Expected discharge: tentative discharge on Wednesday morning if he continues to do well, mother prefers early morning discharge    are consulted and will start working on dispostion arrangement     Yanely Liriano MD, [facility]  Text Page (7am - 6pm)    ----------------------------------------------------------------------------------------------------------------------    Interval History   Patient care was assumed this morning , seen and examined , non verbal , known to me from his previous hospitalization, less interactive today , looks slightly dehydrated      -Data reviewed today: I reviewed all new labs and imaging results over the last 24 hours.    I personally reviewed no images or EKG's today.    Physical Exam   Temp: 98.3  F (36.8  C) Temp src: Oral BP: 115/64 Pulse: 104 Heart Rate: 85 Resp: 18 SpO2: 94 % O2 Device: None (Room air)    Vitals:    02/10/20 0049   Weight: 70.2 kg (154 lb 12.2 oz)     Vital Signs with Ranges  Temp:  [98.3  F (36.8  C)-98.5  F (36.9  C)] 98.3  F (36.8  C)  Pulse:  [104] 104  Heart Rate:  [] 85  Resp:  [18] 18  BP: (115-139)/(64-76) 115/64  SpO2:  [92 %-96 %] 94 %  I/O last 3 completed shifts:  In: 180 [NG/GT:180]  Out: -     GENERAL: Alert , awake and oriented. NAD.non verbal , looks comfortable   HEENT: Normocephalic. EOMI. No icterus or injection. Nares normal.   LUNGS: Clear to auscultation. No dyspnea at rest. Few rhonchi in right lung base.  HEART: Regular rate. Extremities perfused.   ABDOMEN: Soft, nontender, and nondistended. Positive bowel sounds.   EXTREMITIES: No LE edema noted.   NEUROLOGIC: Moves extremities x4 on command. No acute focal  neurologic abnormalities noted.     Medications       acetylcysteine  2 mL Nebulization 4x Daily     albuterol  2.5 mg Nebulization Q4H While awake     aspirin  81 mg Oral or Feeding Tube Daily     Brivaracetam  100 mg Oral or Feeding Tube BID     carBAMazepine  150 mg Oral or Feeding Tube Q6H     ferrous sulfate  220 mg Oral Daily     fiber modular (NUTRISOURCE FIBER)  1 packet Per G Tube Daily     hydrocortisone  10 mg Oral or Feeding Tube Daily with supper     hydrocortisone  20 mg Oral or Feeding Tube QAM     levothyroxine  150 mcg Oral QAM     metoclopramide  5 mg Per Feeding Tube BID     pantoprazole  40 mg Per J Tube Daily     piperacillin-tazobactam  4.5 g Intravenous Q6H     potassium & sodium phosphates  1 packet Oral TID     Scopolamine HBr  0.8 mg Oral or FT or NG tube TID     sodium bicarbonate  650 mg Oral Daily     sodium chloride (PF)  3 mL Intracatheter Q8H     vitamin C  1,000 mg Oral or Feeding Tube Daily     vitamin D3  2,000 Units Oral Daily       Data   Recent Labs   Lab 02/10/20  0742 02/09/20  2100   WBC 13.1* 17.6*   HGB 13.2* 13.4   MCV 84 83    193    132*   POTASSIUM 4.0 4.4   CHLORIDE 104 101   CO2 27 26   BUN 16 14   CR 0.94 0.70   ANIONGAP 5 5   STEVE 8.7 8.6   GLC 94 117*   ALBUMIN  --  3.1*   PROTTOTAL  --  8.3   BILITOTAL  --  0.4   ALKPHOS  --  117   ALT  --  27   AST  --  17       Imaging:   Recent Results (from the past 24 hour(s))   XR Chest 2 Views    Narrative    CHEST TWO VIEW   2/9/2020 9:28 PM     HISTORY:  Cough, low grade fever.    COMPARISON: 1/31/2020 radiographs.    FINDINGS: The cardiomediastinal silhouette and pulmonary vasculature  remain within normal limits. The left lung remains clear. No effusions  or osseous changes. There is a moderate amount of new hazy opacity in  the right lung base. Elevated right diaphragm again noted. No other  change.      Impression    IMPRESSION: New right basilar infiltrate.    BARBARA CALVIN MD

## 2020-02-10 NOTE — PHARMACY-ADMISSION MEDICATION HISTORY
Pharmacy Medication History  Admission medication history interview status for the 2/9/2020  admission is complete. See EPIC admission navigator for prior to admission medications     Medication history sources: Patient's family/friend (mother)  Medication history source reliability: Good  Adherence assessment: Good    Significant changes made to the medication list:  None      Additional medication history information:   None    Medication reconciliation completed by provider prior to medication history? No    Time spent in this activity: 15 minutes      Prior to Admission medications    Medication Sig Last Dose Taking? Auth Provider   acetylcysteine (MUCOMYST) 20 % neb solution Take 2 mLs by nebulization 4 times daily With albuterol at 0700, 1100, 1500, and 1900  2/9/2020 at x3 Yes Unknown, Entered By History   albuterol (PROVENTIL) (5 MG/ML) 0.5% neb solution Take 2.5 mg by nebulization every 4 hours (while awake) 0700 1100 1500 1900 with mucomyst  2/9/2020 at x3 Yes Unknown, Entered By History   aspirin (ASA) 81 MG chewable tablet 81 mg by Oral or Feeding Tube route daily At 0900 2/9/2020 at Unknown time Yes Unknown, Entered By History   bacitracin ointment Apply topically daily as needed for wound care To PEG site.   at PRN Yes Unknown, Entered By History   Bioflavonoid Products (VITAMIN C PLUS) 1000 MG TABS 1 tablet by Oral or FT or NG tube route 2/9/2020 at Unknown time Yes Unknown, Entered By History   Brivaracetam (BRIVIACT) 10 MG/ML solution 100 mg by Oral or Feeding Tube route 2 times daily 0900, 2100 2/9/2020 at x1 Yes Unknown, Entered By History   calcium carbonate 1250 (500 CA) MG/5ML SUSP suspension 5 mLs (1,250 mg) by Per J Tube route 3 times daily (with meals) 2/9/2020 at x2 Yes Mariana Venegas MD   carBAMazepine (TEGRETOL) 100 MG/5ML suspension 150 mg by Oral or Feeding Tube route every 6 hours At 06:00, 12:00, 18:00 and 24:00 for seizures  2/9/2020 at x3 Yes Unknown, Entered By History    ferrous sulfate 220 (44 Fe) MG/5ML ELIX 220 mg by Per Feeding Tube route daily 2/9/2020 at Unknown time Yes Unknown, Entered By History   Guar Gum (FIBER MODULAR, NUTRISOURCE FIBER,) packet 1 packet by Per G Tube route daily 2/9/2020 at Unknown time Yes Starr Champion MD   hydrocortisone (CORTEF) 5 MG tablet 10 mg by Oral or FT or NG tube route daily (with dinner) At 1500 2/9/2020 at Unknown time Yes Unknown, Entered By History   hydrocortisone (CORTEF) 5 MG tablet 20 mg by Oral or FT or NG tube route every morning  2/9/2020 at Unknown time Yes Unknown, Entered By History   hydrocortisone 1 % CREA cream Place rectally 2 times daily as needed for other Apply to reddened memo areas as needed  at PRN Yes Unknown, Entered By History   levothyroxine (SYNTHROID/LEVOTHROID) 150 MCG tablet Take 150 mcg by mouth every morning 2/9/2020 at Unknown time Yes Unknown, Entered By History   melatonin (MELATONIN) 1 MG/ML LIQD liquid 6 mg by Per NG tube route At Bedtime  2/8/2020 at Unknown time Yes Unknown, Entered By History   metoclopramide (REGLAN) 5 MG/5ML solution 5 mg by Per Feeding Tube route 2 times daily 2/9/2020 at Unknown time Yes Unknown, Entered By History   miconazole (MICATIN) 2 % AERP powder Apply topically 2 times daily as needed   at PRN Yes Unknown, Entered By History   mupirocin (BACTROBAN) 2 % external ointment Apply topically 2 times daily as needed   at PRN Yes Reported, Patient   pantoprazole (PROTONIX) 2 mg/mL SUSP suspension 20 mLs (40 mg) by Per J Tube route daily 2/9/2020 at Unknown time Yes Washington Connors MD   potassium & sodium phosphates (NEUTRA-PHOS) 280-160-250 MG Packet Take 1 packet by mouth 3 times daily  2/9/2020 at Unknown time Yes Yanely Liriano MD   Scopolamine HBr POWD Dispense #90. Mix contents with small amount of water for admin via J-tube.  Administer 0.8 mg three times each day. 2/9/2020 at Unknown time Yes Jennie Bermudez MD   Skin Protectants, Misc. (BALMEX SKIN  PROTECTANT) OINT Externally apply topically 2 times daily as needed (irritation) Applay to reddened memo areas twice daily as needed  at PRN Yes Unknown, Entered By History   sodium bicarbonate 650 MG tablet Take 1 tablet (650 mg) by mouth daily 2/9/2020 at Unknown time Yes Ricky Torres MD   testosterone cypionate (DEPOTESTOTERONE CYPIONATE) 200 MG/ML injection Inject 76 mg into the muscle See Admin Instructions Every 2 weeks on Thursdays  76 mg or 0.38 mL 2/6/2020 Yes Unknown, Entered By History   vitamin D3 (CHOLECALCIFEROL) 2000 units (50 mcg) tablet Take 2,000 Units by mouth daily Crush and feed via j-tube @@ 0900 2/9/2020 at Unknown time Yes Unknown, Entered By History   order for DME Equipment being ordered: Nebulizer   Starr Champion MD

## 2020-02-10 NOTE — ED PROVIDER NOTES
History     Chief Complaint:  Fever     The history is provided by the EMS personnel. The history is limited by the condition of the patient.      Keyon Farias is a 59 year old male with a history of pneumonia, TBI, DVT, ventricular fibrillation, septic shock, seizures, stroke who presents to the emergency department for evaluation of fever. Per EMS, they were contacted by the patient's mother after she had concern for the patient's temperature of 100.2 F, for which she gave him 1 dose of Tylenol prior to contacting EMS. The mother also noted to EMS that the patient appeared to have some chest congestion, but EMS denied any other known complaints. They did not record fever en route and found patient to have blood pressure of 110/70.    ROS limited by patient's aphasia.  Further history obtained from mother.    Allergies:  Dilantin [Phenytoin Sodium]  Valproic Acid     Medications:    Albuterol  Aspirin  Tegretol  Levothyroxine  Melatonin  Reglan  Protonix  Testosterone     Past Medical History:    Aphasia due to TBI  TBI  DVT of upper extremity  GERD  Panhypopituitarism  Pneumonia  Seizures   Septic shock  Thyroid disease  Tracheostomy care  Cerebral artery occlusion with cerebral infarction  UTI  Ventricular fibrillation    Past Surgical History:    EGD combined x3  Reconstructive facial surgery   Jejunostomy tube placement  Appendectomy  IR PICC placement, left   Insertion tube gastrotomy  Right hand repair  Tracheostomy  Vascular surgery    Family History:    Cancer     Social History:  Presents alone via EMS.  Former smoker, quit 1989.  Negative for alcohol use.  Marital Status:  Single [1]    Review of Systems   Unable to perform ROS: Patient nonverbal     Physical Exam     Patient Vitals for the past 24 hrs:   BP Temp Temp src Pulse Heart Rate SpO2   02/09/20 2115 -- -- -- -- -- 95 %   02/09/20 2110 139/76 -- -- -- -- 95 %   02/09/20 2045 -- -- -- -- -- 96 %   02/09/20 2040 -- -- -- -- -- 96 %   02/09/20  2030 -- -- -- -- -- 95 %   02/09/20 2018 127/75 98.5  F (36.9  C) Oral 104 104 95 %     Physical Exam  General: Well-nourished, nods head, alert but unable to speak  Eyes: PERRL, conjunctivae pink no scleral icterus or conjunctival injection  ENT:  Moist mucus membranes, posterior oropharynx clear without erythema or exudates  Respiratory:  +wet cough. Lungs coarse at lung bases bilaterally. Good air movement. No rubs/wheezes  CV: Normal rate and rhythm, no murmurs/rubs/gallops  GI:  Abdomen soft and non-distended.  Normoactive BS.  No apparent tenderness, guarding or rebound  Skin: Warm, dry.  No rashes or petechiae  Neuro: Alert and opens mouth for exam, nonverbal, no apparent cranial nerve deficits  Psychiatric: Pleasant, unable to perform further psych exam due to aphasia    Emergency Department Course     Imaging:  Radiology findings were communicated with the patient and mother who voiced understanding of the findings.    XR Chest, 2 Views:  New right basilar infiltrate.  As per radiology.    Laboratory:  Laboratory findings were communicated with the patient and mother who voiced understanding of the findings.    CBC: WBC: 17.6 (H), HGB: 13.4, PLT: 193  CMP: Glucose 117 (H), Albumin 3.1 (L), o/w WNL (Creatinine: 0.70)    Lactic acid: 1.1    Influenza A/B antigen: all negative    Blood culture x2 pending.    Interventions:  2236 Zosyn 4.5 g IV    Emergency Department Course:  Past medical records, nursing notes, and vitals reviewed.    2022 I performed an exam of the patient as documented above.     IV was inserted and blood was drawn for laboratory testing, results above.    The patient was sent for a XR Chest while in the emergency department, results above.     2200 I rechecked the patient and discussed the results of his workup thus far.     2213 I spoke with Dr. Keita, hospitalist, who agreed to admit the patient.    Findings and plan explained to the Patient and mother who consents to admission.  Discussed the patient with Dr. Keita, who will admit the patient to an inpatient bed for further monitoring, evaluation, and treatment.    I personally reviewed the laboratory results with the Patient and mother and answered all related questions prior to admission.     Impression & Plan     Medical Decision Making:  Keyon Farias is a 59 year old male with a history of chronic and recurrent aspiration pneumonia who comes today after likely aspiration event with a fever and cough.  White count is elevated.  He did have a low-grade fever at home.  Technically, he meets criteria for sepsis.  Fortunately, he does not have signs of severe sepsis or septic shock.  Chest x-ray shows a new right basilar infiltrate.  Blood cultures were obtained.  Lactic acid was normal.  Influenza was normal.  His blood pressures have remained stable.  He was treated with Zosyn after review of previous infectious disease recommendations.  He will be admitted to the hospitalist service for ongoing IV antibiotics, IV medications and nursing under the care of Dr. Keita.  He and his mother were updated and were in agreement with the plan.    Diagnosis:    ICD-10-CM   1. Pneumonia of right lower lobe due to infectious organism (H) J18.1   2. Sepsis without acute organ dysfunction, due to unspecified organism (H) A41.9       Disposition:  Admitted to Dr. Keita.    Scribe Disclosure:  Dalton DYKES, am serving as a scribe at 8:16 PM on 2/9/2020 to document services personally performed by Germaine Green MD based on my observations and the provider's statements to me.      Germaine Green MD  02/09/20 0950

## 2020-02-10 NOTE — ED NOTES
[facility]  ED Nurse Handoff Report    ED Chief complaint: Fever      ED Diagnosis:   Final diagnoses:   Pneumonia of right lower lobe due to infectious organism (H)   Sepsis without acute organ dysfunction, due to unspecified organism (H)       Code Status: Full Code    Allergies:   Allergies   Allergen Reactions     Dilantin [Phenytoin Sodium]      Valproic Acid      Toxicity c bone marrow suspension, elevated ammonia levels        Patient Story: fever  Focused Assessment:  Patient is a total care and lives with his mother, she has PCA and caregivers.  He has been really sleepy and restless at night. Today she checked his temp and it was 100.4, she gave him some tyl and sent him to the ER to get worked up due to his total care situation and fragility.  His xray showed some right side PNA.    Labs Ordered and Resulted from Time of ED Arrival Up to the Time of Departure from the ED   CBC WITH PLATELETS DIFFERENTIAL - Abnormal; Notable for the following components:       Result Value    WBC 17.6 (*)     RDW 16.3 (*)     Absolute Neutrophil 13.2 (*)     All other components within normal limits   COMPREHENSIVE METABOLIC PANEL - Abnormal; Notable for the following components:    Sodium 132 (*)     Glucose 117 (*)     Albumin 3.1 (*)     All other components within normal limits   LACTIC ACID WHOLE BLOOD   PROCALCITONIN   PULSE OXIMETRY NURSING   CARDIAC CONTINUOUS MONITORING   STRICT INTAKE AND OUTPUT   PERIPHERAL IV CATHETER   BLOOD CULTURE   BLOOD CULTURE   INFLUENZA A/B ANTIGEN         Treatments and/or interventions provided: Labs and ABX, with xray  Patient's response to treatments and/or interventions: Fair    To be done/followed up on inpatient unit:  NA    Does this patient have any cognitive concerns?: Hx Head Trauma    Activity level - Baseline/Home: Total Care  Activity Level - Current:   Total Care    Patient's Preferred language: English   Needed?: No    Isolation:  MRSA/VRE  Infection: Not Applicable  MRSA  VRE  Bariatric?: No    Vital Signs:   Vitals:    02/09/20 2018   BP: 127/75   Pulse: 104   Temp: 98.5  F (36.9  C)   TempSrc: Oral   SpO2: 95%       Cardiac Rhythm:     Was the PSS-3 completed:   Yes  What interventions are required if any?               Family Comments: Mother is at the bedside  OBS brochure/video discussed/provided to patient/family: No              Name of person given brochure if not patient: na              Relationship to patient: na  For the majority of the shift this patient's behavior was Green.   Behavioral interventions performed were NA.    ED NURSE PHONE NUMBER: *[phone]

## 2020-02-10 NOTE — PHARMACY-CONSULT NOTE
Pharmacy Tube Feeding Consult    Medication reviewed for administration by feeding tube and for potential food/drug interactions.    Recommendation: No changes are needed at this time.     Pharmacy will continue to follow as new medications are ordered.    Christa Raymundo, PharmD, BCPS

## 2020-02-10 NOTE — PLAN OF CARE
Cognitive Concerns/ Orientation : Alert. HECTOR orientation, pt is non-verbal, prior history of TBI.    BEHAVIOR & AGGRESSION TOOL COLOR: Green  CIWA SCORE: N/A   ABNL VS/O2: VSS, ex tachycardic at times, on room air  MOBILITY: Turn/repositioning q2h  PAIN MANAGMENT: Appears comfortable, no non-verbal indicators of pain  DIET: NPO, tube feeding  BOWEL/BLADDER: Incontinent of bladder and bowel. Will occasionally use urinal with assistance.   ABNL LAB/BG: WBC 17.6  DRAIN/DEVICES: G/J tube, clamped overnight. PIV saline locked, intermittent antibiotics  TELEMETRY RHYTHM: N/a  SKIN: Blanchable redness to coccyx/sacrum, protective mepilex in place.   TESTS/PROCEDURES: CXR showed R side pneumonia  D/C DAY/GOALS/PLACE: TBD  OTHER IMPORTANT INFO: R side hemiplegia. LS coarse on R side.  Frequent oral care.

## 2020-02-10 NOTE — ED TRIAGE NOTES
Fever this morning and mother gave him tyl, she called EMS and wanted him to be checked out by MD.  If he is ok we can send him home.

## 2020-02-10 NOTE — CONSULTS
Care Transition Initial Assessment - RN        DATA   Active Problems:    Recurrent Aspiration Pneumonia     Cognitive Status: awake, hx TBI     Contact information and PCP information verified: Yes     Insurance concerns: No Insurance issues identified  ASSESSMENT  Patient currently receives the following services: [facility] (P:[phone]) (Fax:[phone])for RN 12 Hr coverage daily and also receives a 12 hr PCA adarsh/night through [facility] (currently 80.5 hours every 7 days) and TF with supplies through [facility].     Identified issues/concerns regarding health management: Pt resides with his Mother Savannah.   She is also his Legal Guardian.  His PCA also lives with them.  Pt had 21 admissions in 2019.  Pt has not had any readmissions in the past 30 days.  Pt is using Scopolamine tid, that has really helped decrease his secretions.  Pt is attending a day program through [facility] for 3 hrs T-F AM.  Pt has transportation provided for this.  Savannah reported she takes pt in her own car for his medical appointments.  She has had back surgery, so is not able to help pt's home care nurse put him in the vehicle.  She is planning on knee surgery 2/17/20, so pt is maybe at a higher risk of another readmission.  Writer had discussed concern in the past, on caregiver's back, as pt is not able to help with transferring.  Savannah feels pt should not discharge unless his temp is <98, as he runs a low temp at baseline.        PLAN  Financial costs for the patient include:NA    Patient/family is agreeable to the plan?  Yes: home Wednesday    PCP is scheduled for 2/20/20.  This was scheduled later due to Savannah having knee surgery on 2/17/20.    Planning hospital discharge on Wednesday.  Savannah likes his transportation home by 12:00 noon, via [employer].   Transportation has been set up for 10:30AM on Wednesday 2/12.    Care  (CTS) will continue to follow as  needed.

## 2020-02-11 LAB
ERYTHROCYTE [DISTWIDTH] IN BLOOD BY AUTOMATED COUNT: 16.4 % (ref 10–15)
HCT VFR BLD AUTO: 38.2 % (ref 40–53)
HGB BLD-MCNC: 12.4 G/DL (ref 13.3–17.7)
LACTATE BLD-SCNC: 1.4 MMOL/L (ref 0.7–2)
MCH RBC QN AUTO: 27.3 PG (ref 26.5–33)
MCHC RBC AUTO-ENTMCNC: 32.5 G/DL (ref 31.5–36.5)
MCV RBC AUTO: 84 FL (ref 78–100)
PLATELET # BLD AUTO: 189 10E9/L (ref 150–450)
RBC # BLD AUTO: 4.55 10E12/L (ref 4.4–5.9)
WBC # BLD AUTO: 9.5 10E9/L (ref 4–11)

## 2020-02-11 PROCEDURE — 36415 COLL VENOUS BLD VENIPUNCTURE: CPT | Performed by: INTERNAL MEDICINE

## 2020-02-11 PROCEDURE — 40000275 ZZH STATISTIC RCP TIME EA 10 MIN

## 2020-02-11 PROCEDURE — 85027 COMPLETE CBC AUTOMATED: CPT | Performed by: INTERNAL MEDICINE

## 2020-02-11 PROCEDURE — 27210429 ZZH NUTRITION PRODUCT INTERMEDIATE LITER

## 2020-02-11 PROCEDURE — 25000132 ZZH RX MED GY IP 250 OP 250 PS 637: Mod: GY | Performed by: INTERNAL MEDICINE

## 2020-02-11 PROCEDURE — 25000128 H RX IP 250 OP 636: Performed by: INTERNAL MEDICINE

## 2020-02-11 PROCEDURE — 25000125 ZZHC RX 250: Performed by: INTERNAL MEDICINE

## 2020-02-11 PROCEDURE — 94640 AIRWAY INHALATION TREATMENT: CPT

## 2020-02-11 PROCEDURE — 94640 AIRWAY INHALATION TREATMENT: CPT | Mod: 76

## 2020-02-11 PROCEDURE — 83605 ASSAY OF LACTIC ACID: CPT | Performed by: INTERNAL MEDICINE

## 2020-02-11 PROCEDURE — 99233 SBSQ HOSP IP/OBS HIGH 50: CPT | Performed by: INTERNAL MEDICINE

## 2020-02-11 PROCEDURE — 12000000 ZZH R&B MED SURG/OB

## 2020-02-11 RX ORDER — CHOLECALCIFEROL (VITAMIN D3) 50 MCG
2000 TABLET ORAL DAILY
Status: DISCONTINUED | OUTPATIENT
Start: 2020-02-12 | End: 2020-02-12 | Stop reason: HOSPADM

## 2020-02-11 RX ORDER — LEVOTHYROXINE SODIUM 150 UG/1
150 TABLET ORAL EVERY MORNING
Status: DISCONTINUED | OUTPATIENT
Start: 2020-02-11 | End: 2020-02-12 | Stop reason: HOSPADM

## 2020-02-11 RX ADMIN — ACETYLCYSTEINE 2 ML: 200 SOLUTION ORAL; RESPIRATORY (INHALATION) at 19:38

## 2020-02-11 RX ADMIN — Medication 0.8 MG: at 17:56

## 2020-02-11 RX ADMIN — METOCLOPRAMIDE 5 MG: 5 SOLUTION ORAL at 21:29

## 2020-02-11 RX ADMIN — METOCLOPRAMIDE 5 MG: 5 SOLUTION ORAL at 08:33

## 2020-02-11 RX ADMIN — HYDROCORTISONE 10 MG: 10 TABLET ORAL at 16:15

## 2020-02-11 RX ADMIN — CARBAMAZEPINE 150 MG: 100 SUSPENSION ORAL at 11:18

## 2020-02-11 RX ADMIN — ONDANSETRON 4 MG: 2 INJECTION INTRAMUSCULAR; INTRAVENOUS at 20:23

## 2020-02-11 RX ADMIN — POTASSIUM & SODIUM PHOSPHATES POWDER PACK 280-160-250 MG 1 PACKET: 280-160-250 PACK at 21:29

## 2020-02-11 RX ADMIN — HYDROCORTISONE 20 MG: 20 TABLET ORAL at 08:30

## 2020-02-11 RX ADMIN — Medication 1 PACKET: at 08:31

## 2020-02-11 RX ADMIN — ACETYLCYSTEINE 2 ML: 200 SOLUTION ORAL; RESPIRATORY (INHALATION) at 10:43

## 2020-02-11 RX ADMIN — POTASSIUM & SODIUM PHOSPHATES POWDER PACK 280-160-250 MG 1 PACKET: 280-160-250 PACK at 08:31

## 2020-02-11 RX ADMIN — ACETYLCYSTEINE 2 ML: 200 SOLUTION ORAL; RESPIRATORY (INHALATION) at 07:02

## 2020-02-11 RX ADMIN — CHOLECALCIFEROL TAB 50 MCG (2000 UNIT) 2000 UNITS: 50 TAB at 08:30

## 2020-02-11 RX ADMIN — CARBAMAZEPINE 150 MG: 100 SUSPENSION ORAL at 06:33

## 2020-02-11 RX ADMIN — Medication 40 MG: at 08:32

## 2020-02-11 RX ADMIN — PIPERACILLIN AND TAZOBACTAM 4.5 G: 4; .5 INJECTION, POWDER, FOR SOLUTION INTRAVENOUS at 05:33

## 2020-02-11 RX ADMIN — Medication 0.8 MG: at 08:31

## 2020-02-11 RX ADMIN — ALBUTEROL SULFATE 2.5 MG: 2.5 SOLUTION RESPIRATORY (INHALATION) at 07:02

## 2020-02-11 RX ADMIN — CARBAMAZEPINE 150 MG: 100 SUSPENSION ORAL at 17:57

## 2020-02-11 RX ADMIN — ALBUTEROL SULFATE 2.5 MG: 2.5 SOLUTION RESPIRATORY (INHALATION) at 10:42

## 2020-02-11 RX ADMIN — MINERAL SUPPLEMENT IRON 300 MG / 5 ML STRENGTH LIQUID 100 PER BOX UNFLAVORED 220 MG: at 08:34

## 2020-02-11 RX ADMIN — PIPERACILLIN AND TAZOBACTAM 4.5 G: 4; .5 INJECTION, POWDER, FOR SOLUTION INTRAVENOUS at 11:17

## 2020-02-11 RX ADMIN — CARBAMAZEPINE 150 MG: 100 SUSPENSION ORAL at 23:33

## 2020-02-11 RX ADMIN — ACETYLCYSTEINE 2 ML: 200 SOLUTION ORAL; RESPIRATORY (INHALATION) at 16:03

## 2020-02-11 RX ADMIN — ALBUTEROL SULFATE 2.5 MG: 2.5 SOLUTION RESPIRATORY (INHALATION) at 19:38

## 2020-02-11 RX ADMIN — LEVOTHYROXINE SODIUM 150 MCG: 150 TABLET ORAL at 08:30

## 2020-02-11 RX ADMIN — POTASSIUM & SODIUM PHOSPHATES POWDER PACK 280-160-250 MG 1 PACKET: 280-160-250 PACK at 16:15

## 2020-02-11 RX ADMIN — BRIVARACETAM 100 MG: 10 SOLUTION ORAL at 21:41

## 2020-02-11 RX ADMIN — Medication 0.8 MG: at 21:29

## 2020-02-11 RX ADMIN — PIPERACILLIN AND TAZOBACTAM 4.5 G: 4; .5 INJECTION, POWDER, FOR SOLUTION INTRAVENOUS at 16:16

## 2020-02-11 RX ADMIN — SODIUM BICARBONATE 650 MG TABLET 650 MG: at 08:30

## 2020-02-11 RX ADMIN — CARBAMAZEPINE 150 MG: 100 SUSPENSION ORAL at 01:02

## 2020-02-11 RX ADMIN — PIPERACILLIN AND TAZOBACTAM 4.5 G: 4; .5 INJECTION, POWDER, FOR SOLUTION INTRAVENOUS at 23:32

## 2020-02-11 RX ADMIN — BRIVARACETAM 100 MG: 10 SOLUTION ORAL at 08:57

## 2020-02-11 RX ADMIN — ASPIRIN 81 MG 81 MG: 81 TABLET ORAL at 08:31

## 2020-02-11 RX ADMIN — OXYCODONE HYDROCHLORIDE AND ACETAMINOPHEN 1000 MG: 500 TABLET ORAL at 08:30

## 2020-02-11 RX ADMIN — ALBUTEROL SULFATE 2.5 MG: 2.5 SOLUTION RESPIRATORY (INHALATION) at 16:02

## 2020-02-11 ASSESSMENT — ACTIVITIES OF DAILY LIVING (ADL)
ADLS_ACUITY_SCORE: 41

## 2020-02-11 NOTE — PLAN OF CARE
Cognitive Concerns/ Orientation : Alert. Mute; makes occational sounds/head nods.    BEHAVIOR & AGGRESSION TOOL COLOR: green   CIWA SCORE:             ABNL VS/O2: VSS. RA.   MOBILITY: Ax2; total care; Turn/Repo   PAIN MANAGMENT: denies at this time; nonverbal indicators absent   DIET: NPO; tube feed from 7A-7P  BOWEL/BLADDER: incontinent of bowel and bladder   ABNL LAB/BG:   DRAIN/DEVICES: R PIV, SL, intermittent abx.  Integrity verified.  Slightly pink within tegaderm borders. Flushes well, no change throughout shift.  TELEMETRY RHYTHM: n/a  SKIN: redness blanchable to coccyx   TESTS/PROCEDURES: none  D/C DAY/GOALS/PLACE: Discharge tomorrow 2/12 at 1030, per SW.  OTHER IMPORTANT INFO: declining suctioning, oral cares. Productive cough. Attempting alternative oral care device(s). Mother updated while she was at bedside.

## 2020-02-11 NOTE — PROGRESS NOTES
Pt will discharge at 10:30AM tomorrow via [facility] transport (stretcher).  His mother is aware and in agreement.  She wanted to make sure that pt's Scopolamine is sent home with pt.  Will need orders to resume [facility] ST and PT.  Will send orders to [facility] when they are available.

## 2020-02-11 NOTE — PROGRESS NOTES
[facility]    HOSPITALIST PROGRESS NOTE :   --------------------------------------------------    Date of Admission:  2/9/2020    Cumulative Summary:  Keyon Farias is a 57 year old male with PMHx of TBI nonverbal at baseline with chronic  R sided spastic hemiplegia, dysphagia with G-tube for TFs/meds, seizure disorder, panhypopituitarism and frequent hospital admissions for recurrent pneumonia who was admitted on 2/9/2020 for management of recurrent aspiration pneumonia    Assessment & Plan     Recurrent Aspiration Pneumonia  Hospitalized on 21 occasions in the calendar year 2019, most of which were related to pneumonia. Had been doing well in recent weeks, which his mom attributes to brushing teeth and oral cavity with alcohol mouthrinse TID. Sounds as though his home RN, who was recently sick, hasn't been keeping up with this in recent days. 3 days prior to admission, his mother noticed patient developed a cough and congestion. Today he had a low grade fever at 100.4. Brought to ED for further evaluation. Was afebrile and hemodynamically stable. O2 sats in 90s on RA. WBC 17.6. Lactate nl. Procal 0.08. CXR showed a new R basilar infiltrate. Started on Zosyn for presumed aspiration pneumonia.     -- cont Zosyn, anticipate transition to oral abx on discharge possibly 2/12  -- cont frequent oral cares  -- cont mucomyst and albuterol nebs as per prior to admission  --Wean oxygen down as possible     TBI  Seizure disorder secondary to TBI  With spastic hemiplegia, dysphagia with recurrent aspiration. Has feeding tube.  -- continue tube feeds , RD is consulted   -- cont seizure meds per home regimen  -- cont other routine skin cares per routine given hx of pressure injuries and skin breakdown     Panhypopituitarism  Chronic and stable. Maintained on hydrocortisone, levothyroxine and testosterone.   -- cont hydrocortisone and levothyroxine    Diet: NPO for Medical/Clinical Reasons Except for: Meds  Adult  Formula Drip Feeding: Continuous Isosource 1.5; Jejunostomy; Goal Rate: 100; mL/hr; From: 7:00 AM; 7:00 PM; Medication - Feeding Tube Flush Frequency: At least 15-30 mL water before and after medication administration and with tube clogging;...    Lerma Catheter: not present  DVT Prophylaxis: Pneumatic Compression Devices  Code Status: Full Code    The patient's care was discussed with the Bedside Nurse and Patient.    Disposition Plan   Expected discharge: tentative discharge on Wednesday morning if he continues to do well, mother prefers early morning discharge   Cussed with discussed with social work    Melisa Ash MD, FACP      ----------------------------------------------------------------------------------------------------------------------    Interval History   Patient is awake, noncommunicative which is his baseline, no new complaints as per nursing staff, tube feeds infusing, no cough noted.    -Data reviewed today: I reviewed all new labs and imaging results over the last 24 hours.    I personally reviewed no images or EKG's today.    Physical Exam   Temp: 98  F (36.7  C) Temp src: Axillary BP: 92/51 Pulse: 95 Heart Rate: 93 Resp: 17 SpO2: 96 % O2 Device: None (Room air)    Vitals:    02/10/20 0049 02/11/20 0430   Weight: 70.2 kg (154 lb 12.2 oz) 70.6 kg (155 lb 10.3 oz)     Vital Signs with Ranges  Temp:  [97.7  F (36.5  C)-98.6  F (37  C)] 98  F (36.7  C)  Pulse:  [] 95  Heart Rate:  [93] 93  Resp:  [17-20] 17  BP: ()/(51-88) 92/51  SpO2:  [93 %-98 %] 96 %  I/O last 3 completed shifts:  In: 120 [NG/GT:120]  Out: -     GENERAL: Alert , awake and oriented. NAD.non verbal , looks comfortable   HEENT: Normocephalic. EOMI. No icterus or injection. Nares normal.   LUNGS: Right-sided basilar crackles and scattered rhonchi noted  HEART: Regular rate. Extremities perfused.   ABDOMEN: Soft, nontender, and nondistended. Positive bowel sounds.   EXTREMITIES: No LE edema noted.   NEUROLOGIC: No new  neurology changes noted    Medications     dextrose         acetylcysteine  2 mL Nebulization 4x Daily     albuterol  2.5 mg Nebulization Q4H While awake     aspirin  81 mg Oral or Feeding Tube Daily     Brivaracetam  100 mg Oral or Feeding Tube BID     carBAMazepine  150 mg Oral or Feeding Tube Q6H     [START ON 2/12/2020] ferrous sulfate  220 mg Oral or Feeding Tube Daily     fiber modular (NUTRISOURCE FIBER)  1 packet Per G Tube Daily     hydrocortisone  10 mg Oral or Feeding Tube Daily with supper     hydrocortisone  20 mg Oral or Feeding Tube QAM     levothyroxine  150 mcg Oral or Feeding Tube QAM     metoclopramide  5 mg Per Feeding Tube BID     pantoprazole  40 mg Per J Tube Daily     piperacillin-tazobactam  4.5 g Intravenous Q6H     potassium & sodium phosphates  1 packet Oral TID     Scopolamine HBr  0.8 mg Oral or FT or NG tube TID     sodium bicarbonate  650 mg Oral Daily     sodium chloride (PF)  3 mL Intracatheter Q8H     vitamin C  1,000 mg Oral or Feeding Tube Daily     [START ON 2/12/2020] vitamin D3  2,000 Units Oral or Feeding Tube Daily       Data   Recent Labs   Lab 02/11/20  0800 02/10/20  0742 02/09/20  2100   WBC 9.5 13.1* 17.6*   HGB 12.4* 13.2* 13.4   MCV 84 84 83    183 193   NA  --  136 132*   POTASSIUM  --  4.0 4.4   CHLORIDE  --  104 101   CO2  --  27 26   BUN  --  16 14   CR  --  0.94 0.70   ANIONGAP  --  5 5   STEVE  --  8.7 8.6   GLC  --  94 117*   ALBUMIN  --   --  3.1*   PROTTOTAL  --   --  8.3   BILITOTAL  --   --  0.4   ALKPHOS  --   --  117   ALT  --   --  27   AST  --   --  17       Imaging:   No results found for this or any previous visit (from the past 24 hour(s)).

## 2020-02-11 NOTE — PLAN OF CARE
DATE & TIME: 2/10  2235        Cognitive Concerns/ Orientation : HECTOR orientation. Pt is nonverbal.   BEHAVIOR & AGGRESSION TOOL COLOR: Green  CIWA SCORE: na      ABNL VS/O2: Room air. Continuous oxygen monitoring.  MOBILITY: Total assist. Turn Q2H  PAIN MANAGMENT: HECTOR pain. Pt appears relaxed.  DIET: Tube feedings 7a-7p  BOWEL/BLADDER: Incontinent of B/B  ABNL LAB/BG: WBC- 13.1.  DRAIN/DEVICES: G/J tube, PIV.  TELEMETRY RHYTHM: NA  SKIN: Blanchable red wound on coccyx.  TESTS/PROCEDURES: NA  D/C DAY/GOALS/PLACE: 2-3 days back home.  OTHER IMPORTANT INFO: IV abx.

## 2020-02-11 NOTE — PROGRESS NOTES
[facility]  Patient is currently open to home care services with [facility]. The patient is currently receiving Physical Therapy and Speech Language Therapy services. [facility]  and team have been notified of patient admission. [facility] liaison will continue to follow patient during stay. If appropriate provide orders to resume home care at time of discharge.

## 2020-02-11 NOTE — PLAN OF CARE
DATE & TIME: 8880-1468 2/10/2020    Cognitive Concerns/ Orientation : Alert. Mute; makes occational sounds/head nods.    BEHAVIOR & AGGRESSION TOOL COLOR: green   CIWA SCORE:    ABNL VS/O2: VSS. RA.   MOBILITY: Ax2; total care; Turn/Repo   PAIN MANAGMENT: denies at this time; nonverbal indicators absent   DIET: NPO; tube feed from 7A-7P  BOWEL/BLADDER: incontinent of bowel and bladder   ABNL LAB/BG:   DRAIN/DEVICES: saline locked as of 2AM   TELEMETRY RHYTHM:   SKIN: redness blanchable to coccyx   TESTS/PROCEDURES:   D/C DAY/GOALS/PLACE: Discharge wednesday early   OTHER IMPORTANT INFO: denied suctioning. Productive cough.

## 2020-02-12 VITALS
RESPIRATION RATE: 18 BRPM | HEART RATE: 95 BPM | WEIGHT: 160.72 LBS | OXYGEN SATURATION: 94 % | TEMPERATURE: 98 F | SYSTOLIC BLOOD PRESSURE: 97 MMHG | DIASTOLIC BLOOD PRESSURE: 64 MMHG | BODY MASS INDEX: 21.8 KG/M2

## 2020-02-12 LAB
ANION GAP SERPL CALCULATED.3IONS-SCNC: 2 MMOL/L (ref 3–14)
BUN SERPL-MCNC: 15 MG/DL (ref 7–30)
CALCIUM SERPL-MCNC: 8.6 MG/DL (ref 8.5–10.1)
CHLORIDE SERPL-SCNC: 105 MMOL/L (ref 94–109)
CO2 SERPL-SCNC: 29 MMOL/L (ref 20–32)
CREAT SERPL-MCNC: 1.08 MG/DL (ref 0.66–1.25)
GFR SERPL CREATININE-BSD FRML MDRD: 76 ML/MIN/{1.73_M2}
GLUCOSE SERPL-MCNC: 84 MG/DL (ref 70–99)
POTASSIUM SERPL-SCNC: 4 MMOL/L (ref 3.4–5.3)
SODIUM SERPL-SCNC: 136 MMOL/L (ref 133–144)

## 2020-02-12 PROCEDURE — 36415 COLL VENOUS BLD VENIPUNCTURE: CPT | Performed by: INTERNAL MEDICINE

## 2020-02-12 PROCEDURE — 25000132 ZZH RX MED GY IP 250 OP 250 PS 637: Mod: GY | Performed by: INTERNAL MEDICINE

## 2020-02-12 PROCEDURE — 99239 HOSP IP/OBS DSCHRG MGMT >30: CPT | Performed by: INTERNAL MEDICINE

## 2020-02-12 PROCEDURE — 94640 AIRWAY INHALATION TREATMENT: CPT

## 2020-02-12 PROCEDURE — 25000125 ZZHC RX 250: Performed by: INTERNAL MEDICINE

## 2020-02-12 PROCEDURE — 80048 BASIC METABOLIC PNL TOTAL CA: CPT | Performed by: INTERNAL MEDICINE

## 2020-02-12 PROCEDURE — 40000275 ZZH STATISTIC RCP TIME EA 10 MIN

## 2020-02-12 PROCEDURE — 25000128 H RX IP 250 OP 636: Performed by: INTERNAL MEDICINE

## 2020-02-12 PROCEDURE — 99207 ZZC NON-BILLABLE SERV PER CHARTING: CPT | Performed by: INTERNAL MEDICINE

## 2020-02-12 RX ADMIN — CARBAMAZEPINE 150 MG: 100 SUSPENSION ORAL at 06:26

## 2020-02-12 RX ADMIN — OXYCODONE HYDROCHLORIDE AND ACETAMINOPHEN 1000 MG: 500 TABLET ORAL at 08:30

## 2020-02-12 RX ADMIN — SODIUM BICARBONATE 650 MG TABLET 650 MG: at 08:30

## 2020-02-12 RX ADMIN — POTASSIUM & SODIUM PHOSPHATES POWDER PACK 280-160-250 MG 1 PACKET: 280-160-250 PACK at 08:30

## 2020-02-12 RX ADMIN — PIPERACILLIN AND TAZOBACTAM 4.5 G: 4; .5 INJECTION, POWDER, FOR SOLUTION INTRAVENOUS at 08:47

## 2020-02-12 RX ADMIN — ASPIRIN 81 MG 81 MG: 81 TABLET ORAL at 08:27

## 2020-02-12 RX ADMIN — ALBUTEROL SULFATE 2.5 MG: 2.5 SOLUTION RESPIRATORY (INHALATION) at 07:16

## 2020-02-12 RX ADMIN — BRIVARACETAM 100 MG: 10 SOLUTION ORAL at 08:31

## 2020-02-12 RX ADMIN — LEVOTHYROXINE SODIUM 150 MCG: 150 TABLET ORAL at 06:31

## 2020-02-12 RX ADMIN — ACETYLCYSTEINE 2 ML: 200 SOLUTION ORAL; RESPIRATORY (INHALATION) at 07:16

## 2020-02-12 RX ADMIN — CHOLECALCIFEROL TAB 50 MCG (2000 UNIT) 2000 UNITS: 50 TAB at 08:27

## 2020-02-12 RX ADMIN — METOCLOPRAMIDE 5 MG: 5 SOLUTION ORAL at 08:29

## 2020-02-12 RX ADMIN — Medication 40 MG: at 08:29

## 2020-02-12 RX ADMIN — HYDROCORTISONE 20 MG: 20 TABLET ORAL at 08:30

## 2020-02-12 RX ADMIN — MINERAL SUPPLEMENT IRON 300 MG / 5 ML STRENGTH LIQUID 100 PER BOX UNFLAVORED 220 MG: at 08:29

## 2020-02-12 RX ADMIN — Medication 0.8 MG: at 08:29

## 2020-02-12 RX ADMIN — PIPERACILLIN AND TAZOBACTAM 4.5 G: 4; .5 INJECTION, POWDER, FOR SOLUTION INTRAVENOUS at 04:40

## 2020-02-12 ASSESSMENT — ACTIVITIES OF DAILY LIVING (ADL)
ADLS_ACUITY_SCORE: 41

## 2020-02-12 NOTE — PLAN OF CARE
Cognitive Concerns/ Orientation : Alert. Mute; makes occational sounds/head nods.    BEHAVIOR & AGGRESSION TOOL COLOR: green   CIWA SCORE: n/a        ABNL VS/O2: VSS. RA   MOBILITY: Ax2; total care; Turn/Repo   PAIN MANAGMENT: denies at this time; nonverbal indicators absent   DIET: NPO; tube feed from 7A-7P  BOWEL/BLADDER: incontinent of bowel and bladder   ABNL LAB/BG:   DRAIN/DEVICES: R PIV, SL, intermittent abx.  Integrity verified.  Slightly pink within tegaderm borders. Flushes well, no change throughout shift.  TELEMETRY RHYTHM: n/a  SKIN: redness blanchable to coccyx   TESTS/PROCEDURES: none  D/C DAY/GOALS/PLACE: Discharge at 1030, per SW.  OTHER IMPORTANT INFO: oral cares done intermittently (<q2 hrs). Productive cough. Attempting alternative oral care device(s). Mother updated while she was at bedside.  Suction for secretions.    Discharge    Patient discharged to home via stretcher with [facility].    Listed belongings gathered and returned to patient. Yes  Care Plan and Patient education resolved: Yes  Prescriptions if needed, hard copies sent with patient  Yes  Home and hospital acquired medications returned to patient: Yes  Medication Bin checked and emptied on discharge Yes  Follow up appointment made for patient: see CC/SW notes     Mother spoke with Karen ARNDT, this AM regarding aftercare needs and questions.  Mother declined off to speak with RN.  Mother was instructed that AVS would accompany patient home.    Prescription filled here, sent with transport and patient.

## 2020-02-12 NOTE — PROGRESS NOTES
Nebs given as ordered. LS are diminished and coarset/o and pt is on RA. Will continue to follow.  Red Brown

## 2020-02-12 NOTE — PLAN OF CARE
DATE & TIME: 1900-0730 2/11/2020    Cognitive Concerns/ Orientation : Alert; nonverbal; occational sounds and head nods   BEHAVIOR & AGGRESSION TOOL COLOR: green   CIWA SCORE:    ABNL VS/O2: VSS. RA  MOBILITY: Ax2; total; turn/repo   PAIN MANAGMENT: denies   DIET: NPO; tube feedings from 7a-7p  BOWEL/BLADDER: incontinent of bowel and bladder   ABNL LAB/BG:   DRAIN/DEVICES: saline locked   TELEMETRY RHYTHM:   SKIN: pale; redness blanchable   TESTS/PROCEDURES:   D/C DAY/GOALS/PLACE: Discharge tomorrow at 10:30am 2/12/2020  OTHER IMPORTANT INFO: frequent cough. A round of nausea; gave zofran; relief.

## 2020-02-13 ENCOUNTER — DOCUMENTATION ONLY (OUTPATIENT)
Facility: CLINIC | Age: 60
End: 2020-02-13

## 2020-02-13 ENCOUNTER — TELEPHONE (OUTPATIENT)
Facility: CLINIC | Age: 60
End: 2020-02-13

## 2020-02-13 NOTE — PROGRESS NOTES
Paged by ID lab stating blood cultures from 2/9/2020 growing Gram positive bacilli resembling diphtheroids. Patient discharged on Augmentin. Will forward this information to hospitalist and PCP Carlos Gomez.

## 2020-02-13 NOTE — TELEPHONE ENCOUNTER
I contacted primary care physician office and let them know that right arm blood culture 1/2 drawn on 2/8 came back positive for diphtheroids after 5 days , possibly contaminant.

## 2020-02-16 LAB
BACTERIA SPEC CULT: ABNORMAL
SPECIMEN SOURCE: ABNORMAL
SPECIMEN SOURCE: ABNORMAL

## 2020-02-17 ENCOUNTER — APPOINTMENT (OUTPATIENT)
Dept: GENERAL RADIOLOGY | Facility: CLINIC | Age: 60
DRG: 871 | End: 2020-02-17
Attending: EMERGENCY MEDICINE
Payer: MEDICARE

## 2020-02-17 ENCOUNTER — HOSPITAL ENCOUNTER (INPATIENT)
Facility: CLINIC | Age: 60
LOS: 3 days | Discharge: HOME HEALTH | DRG: 871 | End: 2020-02-21
Attending: EMERGENCY MEDICINE | Admitting: INTERNAL MEDICINE
Payer: MEDICARE

## 2020-02-17 DIAGNOSIS — A41.9 SEPSIS, DUE TO UNSPECIFIED ORGANISM, UNSPECIFIED WHETHER ACUTE ORGAN DYSFUNCTION PRESENT (H): ICD-10-CM

## 2020-02-17 DIAGNOSIS — J69.0 ASPIRATION PNEUMONIA OF BOTH LOWER LOBES DUE TO GASTRIC SECRETIONS (H): Primary | ICD-10-CM

## 2020-02-17 DIAGNOSIS — J69.0 ASPIRATION PNEUMONIA, UNSPECIFIED ASPIRATION PNEUMONIA TYPE, UNSPECIFIED LATERALITY, UNSPECIFIED PART OF LUNG (H): ICD-10-CM

## 2020-02-17 DIAGNOSIS — J69.0 ASPIRATION PNEUMONIA OF BOTH LOWER LOBES, UNSPECIFIED ASPIRATION PNEUMONIA TYPE (H): ICD-10-CM

## 2020-02-17 LAB
ALBUMIN UR-MCNC: 10 MG/DL
APPEARANCE UR: CLEAR
BILIRUB UR QL STRIP: NEGATIVE
COLOR UR AUTO: YELLOW
GLUCOSE UR STRIP-MCNC: NEGATIVE MG/DL
HGB UR QL STRIP: NEGATIVE
KETONES UR STRIP-MCNC: NEGATIVE MG/DL
LEUKOCYTE ESTERASE UR QL STRIP: NEGATIVE
NITRATE UR QL: NEGATIVE
PH UR STRIP: 8 PH (ref 5–7)
RBC #/AREA URNS AUTO: 1 /HPF (ref 0–2)
SOURCE: ABNORMAL
SP GR UR STRIP: 1.02 (ref 1–1.03)
UROBILINOGEN UR STRIP-MCNC: 2 MG/DL (ref 0–2)
WBC #/AREA URNS AUTO: 0 /HPF (ref 0–5)

## 2020-02-17 PROCEDURE — 71046 X-RAY EXAM CHEST 2 VIEWS: CPT

## 2020-02-17 PROCEDURE — 99285 EMERGENCY DEPT VISIT HI MDM: CPT | Mod: 25

## 2020-02-17 PROCEDURE — 96366 THER/PROPH/DIAG IV INF ADDON: CPT

## 2020-02-17 PROCEDURE — 96365 THER/PROPH/DIAG IV INF INIT: CPT

## 2020-02-17 PROCEDURE — 36415 COLL VENOUS BLD VENIPUNCTURE: CPT

## 2020-02-17 PROCEDURE — 96375 TX/PRO/DX INJ NEW DRUG ADDON: CPT

## 2020-02-17 PROCEDURE — 81001 URINALYSIS AUTO W/SCOPE: CPT | Performed by: EMERGENCY MEDICINE

## 2020-02-17 PROCEDURE — 87804 INFLUENZA ASSAY W/OPTIC: CPT | Performed by: EMERGENCY MEDICINE

## 2020-02-17 PROCEDURE — 87040 BLOOD CULTURE FOR BACTERIA: CPT | Performed by: EMERGENCY MEDICINE

## 2020-02-17 PROCEDURE — 25800030 ZZH RX IP 258 OP 636: Performed by: EMERGENCY MEDICINE

## 2020-02-17 NOTE — LETTER
Transition Communication Hand-off for Care Transitions to Next Level of Care Provider    Name: Keyon Farias  : 1960  MRN #: [id number]  Primary Care Provider: Carlos Gomez     Primary Clinic: [address] [address]     Reason for Hospitalization:  Aspiration pneumonia of both lower lobes, unspecified aspiration pneumonia type (H) [J69.0]  Sepsis, due to unspecified organism, unspecified whether acute organ dysfunction present (H) [A41.9]  Admit Date/Time: 2020  8:50 PM  Discharge Date: 20  Payor Source: Payor: MEDICARE / Plan: MEDICARE / Product Type: Medicare /     Readmission Assessment Measure (HANS) Risk Score/category:Extreme         Reason for Communication Hand-off Referral: Other Non -Health PCP, high readission risk    Discharge Plan: Home       Discharge Needs Assessment:  Needs      Most Recent Value   Anticipated Changes Related to Illness  none   Current Discharge Risk  physical impairment   # of Referrals Placed by CTS  Transportation, Scheduled Follow-up appointments              Referrals     Future Labs/Procedures    Home Care PT Referral for Hospital Discharge     Comments:    Resume services    Your provider has ordered home care - physical therapy. If you have not been contacted within 2 days of your discharge please call the department phone number listed on the top of this document.    Home Care SLP Referral for Hospital Discharge     Comments:    Resume services    Your provider has ordered home care - speech therapy. If you have not been contacted within 2 days of your discharge please call the department phone number listed on the top of this document.            Key Recommendations:    Pt continues to have frequent readmissions with aspiration pneumonia.  In 2019 he had 22 admissions with the majority of them aspiration problems.  Pt continues to have a 12 hr nurse during the day and a 12 hr PCA during the night  and also [facility]  Care Physical T and Speech Therapy.  Pt was discharged home today on Augmentin for an additional five days.  He has a follow up scheduled with you on 2/27/20.    Thank-you,    Karen Collins RN  Inpatient Care Coordination  [facility]  [phone]    AVS/Discharge Summary is the source of truth; this is a helpful guide for improved communication of patient story

## 2020-02-18 LAB
ALBUMIN SERPL-MCNC: 2.9 G/DL (ref 3.4–5)
ALP SERPL-CCNC: 95 U/L (ref 40–150)
ALT SERPL W P-5'-P-CCNC: 41 U/L (ref 0–70)
ANION GAP SERPL CALCULATED.3IONS-SCNC: 7 MMOL/L (ref 3–14)
AST SERPL W P-5'-P-CCNC: 23 U/L (ref 0–45)
BASOPHILS # BLD AUTO: 0.1 10E9/L (ref 0–0.2)
BASOPHILS NFR BLD AUTO: 0.5 %
BILIRUB SERPL-MCNC: 0.3 MG/DL (ref 0.2–1.3)
BUN SERPL-MCNC: 18 MG/DL (ref 7–30)
CALCIUM SERPL-MCNC: 8.2 MG/DL (ref 8.5–10.1)
CHLORIDE SERPL-SCNC: 101 MMOL/L (ref 94–109)
CO2 SERPL-SCNC: 25 MMOL/L (ref 20–32)
CREAT SERPL-MCNC: 0.73 MG/DL (ref 0.66–1.25)
DIFFERENTIAL METHOD BLD: ABNORMAL
EOSINOPHIL # BLD AUTO: 0.5 10E9/L (ref 0–0.7)
EOSINOPHIL NFR BLD AUTO: 3.2 %
ERYTHROCYTE [DISTWIDTH] IN BLOOD BY AUTOMATED COUNT: 16.1 % (ref 10–15)
FLUAV+FLUBV AG SPEC QL: NEGATIVE
FLUAV+FLUBV AG SPEC QL: NEGATIVE
GFR SERPL CREATININE-BSD FRML MDRD: >90 ML/MIN/{1.73_M2}
GLUCOSE SERPL-MCNC: 88 MG/DL (ref 70–99)
HCT VFR BLD AUTO: 38.9 % (ref 40–53)
HGB BLD-MCNC: 12.9 G/DL (ref 13.3–17.7)
IMM GRANULOCYTES # BLD: 0 10E9/L (ref 0–0.4)
IMM GRANULOCYTES NFR BLD: 0.2 %
LACTATE BLD-SCNC: 1.7 MMOL/L (ref 0.7–2)
LYMPHOCYTES # BLD AUTO: 2.7 10E9/L (ref 0.8–5.3)
LYMPHOCYTES NFR BLD AUTO: 15.7 %
MCH RBC QN AUTO: 27.7 PG (ref 26.5–33)
MCHC RBC AUTO-ENTMCNC: 33.2 G/DL (ref 31.5–36.5)
MCV RBC AUTO: 84 FL (ref 78–100)
MONOCYTES # BLD AUTO: 1.2 10E9/L (ref 0–1.3)
MONOCYTES NFR BLD AUTO: 7 %
NEUTROPHILS # BLD AUTO: 12.5 10E9/L (ref 1.6–8.3)
NEUTROPHILS NFR BLD AUTO: 73.4 %
NRBC # BLD AUTO: 0 10*3/UL
NRBC BLD AUTO-RTO: 0 /100
PLATELET # BLD AUTO: 230 10E9/L (ref 150–450)
POTASSIUM SERPL-SCNC: 3.9 MMOL/L (ref 3.4–5.3)
PROCALCITONIN SERPL-MCNC: 0.1 NG/ML
PROT SERPL-MCNC: 7.5 G/DL (ref 6.8–8.8)
RBC # BLD AUTO: 4.66 10E12/L (ref 4.4–5.9)
SODIUM SERPL-SCNC: 133 MMOL/L (ref 133–144)
SPECIMEN SOURCE: NORMAL
WBC # BLD AUTO: 17.1 10E9/L (ref 4–11)

## 2020-02-18 PROCEDURE — 25000128 H RX IP 250 OP 636: Performed by: INTERNAL MEDICINE

## 2020-02-18 PROCEDURE — 83605 ASSAY OF LACTIC ACID: CPT | Performed by: EMERGENCY MEDICINE

## 2020-02-18 PROCEDURE — 40000895 ZZH STATISTIC SLP IP EVAL DEFER: Performed by: SPEECH-LANGUAGE PATHOLOGIST

## 2020-02-18 PROCEDURE — 94640 AIRWAY INHALATION TREATMENT: CPT

## 2020-02-18 PROCEDURE — 40000275 ZZH STATISTIC RCP TIME EA 10 MIN

## 2020-02-18 PROCEDURE — 25000128 H RX IP 250 OP 636: Performed by: EMERGENCY MEDICINE

## 2020-02-18 PROCEDURE — 85025 COMPLETE CBC W/AUTO DIFF WBC: CPT | Performed by: EMERGENCY MEDICINE

## 2020-02-18 PROCEDURE — 25000125 ZZHC RX 250: Performed by: INTERNAL MEDICINE

## 2020-02-18 PROCEDURE — 25000132 ZZH RX MED GY IP 250 OP 250 PS 637: Mod: GY | Performed by: INTERNAL MEDICINE

## 2020-02-18 PROCEDURE — 27210429 ZZH NUTRITION PRODUCT INTERMEDIATE LITER

## 2020-02-18 PROCEDURE — 99222 1ST HOSP IP/OBS MODERATE 55: CPT | Mod: AI | Performed by: INTERNAL MEDICINE

## 2020-02-18 PROCEDURE — 25800030 ZZH RX IP 258 OP 636: Performed by: EMERGENCY MEDICINE

## 2020-02-18 PROCEDURE — 12000000 ZZH R&B MED SURG/OB

## 2020-02-18 PROCEDURE — 94640 AIRWAY INHALATION TREATMENT: CPT | Mod: 76

## 2020-02-18 PROCEDURE — 25800030 ZZH RX IP 258 OP 636: Performed by: INTERNAL MEDICINE

## 2020-02-18 PROCEDURE — 84145 PROCALCITONIN (PCT): CPT | Performed by: EMERGENCY MEDICINE

## 2020-02-18 PROCEDURE — 80053 COMPREHEN METABOLIC PANEL: CPT | Performed by: EMERGENCY MEDICINE

## 2020-02-18 RX ORDER — LIDOCAINE 40 MG/G
CREAM TOPICAL
Status: DISCONTINUED | OUTPATIENT
Start: 2020-02-18 | End: 2020-02-21 | Stop reason: HOSPADM

## 2020-02-18 RX ORDER — HYDROCORTISONE 10 MG/1
10 TABLET ORAL EVERY 24 HOURS
Status: DISCONTINUED | OUTPATIENT
Start: 2020-02-18 | End: 2020-02-20

## 2020-02-18 RX ORDER — CARBAMAZEPINE 100 MG/5ML
150 SUSPENSION ORAL EVERY 6 HOURS
Status: DISCONTINUED | OUTPATIENT
Start: 2020-02-18 | End: 2020-02-21 | Stop reason: HOSPADM

## 2020-02-18 RX ORDER — ACETAMINOPHEN 325 MG/1
650 TABLET ORAL EVERY 4 HOURS PRN
Status: DISCONTINUED | OUTPATIENT
Start: 2020-02-18 | End: 2020-02-20 | Stop reason: DRUGHIGH

## 2020-02-18 RX ORDER — LEVOTHYROXINE SODIUM 150 UG/1
150 TABLET ORAL EVERY MORNING
Status: DISCONTINUED | OUTPATIENT
Start: 2020-02-18 | End: 2020-02-20

## 2020-02-18 RX ORDER — ASCORBIC ACID 500 MG
1000 TABLET ORAL DAILY
Status: DISCONTINUED | OUTPATIENT
Start: 2020-02-18 | End: 2020-02-20

## 2020-02-18 RX ORDER — SODIUM BICARBONATE 650 MG/1
650 TABLET ORAL DAILY
Status: DISCONTINUED | OUTPATIENT
Start: 2020-02-18 | End: 2020-02-20

## 2020-02-18 RX ORDER — CHOLECALCIFEROL (VITAMIN D3) 50 MCG
2000 TABLET ORAL DAILY
Status: DISCONTINUED | OUTPATIENT
Start: 2020-02-18 | End: 2020-02-20

## 2020-02-18 RX ORDER — IPRATROPIUM BROMIDE AND ALBUTEROL SULFATE 2.5; .5 MG/3ML; MG/3ML
3 SOLUTION RESPIRATORY (INHALATION)
Status: DISCONTINUED | OUTPATIENT
Start: 2020-02-18 | End: 2020-02-21 | Stop reason: HOSPADM

## 2020-02-18 RX ORDER — SODIUM CHLORIDE, SODIUM LACTATE, POTASSIUM CHLORIDE, CALCIUM CHLORIDE 600; 310; 30; 20 MG/100ML; MG/100ML; MG/100ML; MG/100ML
INJECTION, SOLUTION INTRAVENOUS CONTINUOUS
Status: DISCONTINUED | OUTPATIENT
Start: 2020-02-18 | End: 2020-02-19

## 2020-02-18 RX ORDER — PIPERACILLIN SODIUM, TAZOBACTAM SODIUM 3; .375 G/15ML; G/15ML
3.38 INJECTION, POWDER, LYOPHILIZED, FOR SOLUTION INTRAVENOUS EVERY 6 HOURS
Status: DISCONTINUED | OUTPATIENT
Start: 2020-02-18 | End: 2020-02-21 | Stop reason: HOSPADM

## 2020-02-18 RX ORDER — PIPERACILLIN SODIUM, TAZOBACTAM SODIUM 3; .375 G/15ML; G/15ML
3.38 INJECTION, POWDER, LYOPHILIZED, FOR SOLUTION INTRAVENOUS ONCE
Status: COMPLETED | OUTPATIENT
Start: 2020-02-18 | End: 2020-02-18

## 2020-02-18 RX ORDER — TESTOSTERONE CYPIONATE 200 MG/ML
76 INJECTION, SOLUTION INTRAMUSCULAR SEE ADMIN INSTRUCTIONS
Status: DISCONTINUED | OUTPATIENT
Start: 2020-02-18 | End: 2020-02-21 | Stop reason: HOSPADM

## 2020-02-18 RX ORDER — ONDANSETRON 4 MG/1
4 TABLET, ORALLY DISINTEGRATING ORAL EVERY 6 HOURS PRN
Status: DISCONTINUED | OUTPATIENT
Start: 2020-02-18 | End: 2020-02-21 | Stop reason: HOSPADM

## 2020-02-18 RX ORDER — ALBUTEROL SULFATE 0.83 MG/ML
2.5 SOLUTION RESPIRATORY (INHALATION)
Status: DISCONTINUED | OUTPATIENT
Start: 2020-02-18 | End: 2020-02-18

## 2020-02-18 RX ORDER — ONDANSETRON 2 MG/ML
4 INJECTION INTRAMUSCULAR; INTRAVENOUS EVERY 6 HOURS PRN
Status: DISCONTINUED | OUTPATIENT
Start: 2020-02-18 | End: 2020-02-21 | Stop reason: HOSPADM

## 2020-02-18 RX ORDER — GUAR GUM
1 PACKET (EA) ORAL DAILY
Status: DISCONTINUED | OUTPATIENT
Start: 2020-02-18 | End: 2020-02-20

## 2020-02-18 RX ORDER — HYDROCORTISONE 10 MG/1
20 TABLET ORAL DAILY
Status: DISCONTINUED | OUTPATIENT
Start: 2020-02-18 | End: 2020-02-20

## 2020-02-18 RX ORDER — DEXTROSE MONOHYDRATE 100 MG/ML
INJECTION, SOLUTION INTRAVENOUS CONTINUOUS PRN
Status: DISCONTINUED | OUTPATIENT
Start: 2020-02-18 | End: 2020-02-21 | Stop reason: HOSPADM

## 2020-02-18 RX ORDER — NALOXONE HYDROCHLORIDE 0.4 MG/ML
.1-.4 INJECTION, SOLUTION INTRAMUSCULAR; INTRAVENOUS; SUBCUTANEOUS
Status: DISCONTINUED | OUTPATIENT
Start: 2020-02-18 | End: 2020-02-21 | Stop reason: HOSPADM

## 2020-02-18 RX ORDER — ASPIRIN 81 MG/1
81 TABLET, CHEWABLE ORAL DAILY
Status: DISCONTINUED | OUTPATIENT
Start: 2020-02-18 | End: 2020-02-21 | Stop reason: HOSPADM

## 2020-02-18 RX ORDER — HYDROCORTISONE 10 MG/1
10 TABLET ORAL DAILY
Status: DISCONTINUED | OUTPATIENT
Start: 2020-02-18 | End: 2020-02-18

## 2020-02-18 RX ORDER — ACETYLCYSTEINE 200 MG/ML
2 SOLUTION ORAL; RESPIRATORY (INHALATION) 4 TIMES DAILY
Status: DISCONTINUED | OUTPATIENT
Start: 2020-02-18 | End: 2020-02-21 | Stop reason: HOSPADM

## 2020-02-18 RX ORDER — METOCLOPRAMIDE HYDROCHLORIDE 5 MG/5ML
5 SOLUTION ORAL 2 TIMES DAILY
Status: DISCONTINUED | OUTPATIENT
Start: 2020-02-18 | End: 2020-02-20

## 2020-02-18 RX ADMIN — POTASSIUM & SODIUM PHOSPHATES POWDER PACK 280-160-250 MG 1 PACKET: 280-160-250 PACK at 21:45

## 2020-02-18 RX ADMIN — CARBAMAZEPINE 150 MG: 100 SUSPENSION ORAL at 09:01

## 2020-02-18 RX ADMIN — PIPERACILLIN AND TAZOBACTAM 3.38 G: 3; .375 INJECTION, POWDER, FOR SOLUTION INTRAVENOUS at 15:52

## 2020-02-18 RX ADMIN — HYDROCORTISONE SODIUM SUCCINATE 50 MG: 100 INJECTION, POWDER, FOR SOLUTION INTRAMUSCULAR; INTRAVENOUS at 01:13

## 2020-02-18 RX ADMIN — PIPERACILLIN AND TAZOBACTAM 3.38 G: 3; .375 INJECTION, POWDER, LYOPHILIZED, FOR SOLUTION INTRAVENOUS at 01:15

## 2020-02-18 RX ADMIN — SODIUM BICARBONATE 650 MG TABLET 650 MG: at 08:59

## 2020-02-18 RX ADMIN — OXYCODONE HYDROCHLORIDE AND ACETAMINOPHEN 1000 MG: 500 TABLET ORAL at 08:59

## 2020-02-18 RX ADMIN — IPRATROPIUM BROMIDE AND ALBUTEROL SULFATE 3 ML: .5; 3 SOLUTION RESPIRATORY (INHALATION) at 15:42

## 2020-02-18 RX ADMIN — BRIVARACETAM 100 MG: 10 SOLUTION ORAL at 10:17

## 2020-02-18 RX ADMIN — HYDROCORTISONE 10 MG: 10 TABLET ORAL at 15:52

## 2020-02-18 RX ADMIN — PIPERACILLIN AND TAZOBACTAM 3.38 G: 3; .375 INJECTION, POWDER, FOR SOLUTION INTRAVENOUS at 20:51

## 2020-02-18 RX ADMIN — ACETYLCYSTEINE 2 ML: 200 SOLUTION ORAL; RESPIRATORY (INHALATION) at 18:11

## 2020-02-18 RX ADMIN — CHOLECALCIFEROL TAB 50 MCG (2000 UNIT) 2000 UNITS: 50 TAB at 08:58

## 2020-02-18 RX ADMIN — SODIUM CHLORIDE 1000 ML: 9 INJECTION, SOLUTION INTRAVENOUS at 01:12

## 2020-02-18 RX ADMIN — METOCLOPRAMIDE 5 MG: 5 SOLUTION ORAL at 20:51

## 2020-02-18 RX ADMIN — POTASSIUM & SODIUM PHOSPHATES POWDER PACK 280-160-250 MG 1 PACKET: 280-160-250 PACK at 15:52

## 2020-02-18 RX ADMIN — POTASSIUM & SODIUM PHOSPHATES POWDER PACK 280-160-250 MG 1 PACKET: 280-160-250 PACK at 08:59

## 2020-02-18 RX ADMIN — ACETYLCYSTEINE 2 ML: 200 SOLUTION ORAL; RESPIRATORY (INHALATION) at 15:42

## 2020-02-18 RX ADMIN — ACETYLCYSTEINE 2 ML: 200 SOLUTION ORAL; RESPIRATORY (INHALATION) at 11:29

## 2020-02-18 RX ADMIN — ASPIRIN 81 MG 81 MG: 81 TABLET ORAL at 08:58

## 2020-02-18 RX ADMIN — IPRATROPIUM BROMIDE AND ALBUTEROL SULFATE 3 ML: .5; 3 SOLUTION RESPIRATORY (INHALATION) at 11:29

## 2020-02-18 RX ADMIN — BRIVARACETAM 100 MG: 10 SOLUTION ORAL at 20:50

## 2020-02-18 RX ADMIN — LEVOTHYROXINE SODIUM 150 MCG: 150 TABLET ORAL at 08:58

## 2020-02-18 RX ADMIN — CARBAMAZEPINE 150 MG: 100 SUSPENSION ORAL at 20:51

## 2020-02-18 RX ADMIN — SODIUM CHLORIDE, POTASSIUM CHLORIDE, SODIUM LACTATE AND CALCIUM CHLORIDE: 600; 310; 30; 20 INJECTION, SOLUTION INTRAVENOUS at 16:00

## 2020-02-18 RX ADMIN — ALBUTEROL SULFATE 2.5 MG: 2.5 SOLUTION RESPIRATORY (INHALATION) at 08:43

## 2020-02-18 RX ADMIN — SODIUM CHLORIDE, POTASSIUM CHLORIDE, SODIUM LACTATE AND CALCIUM CHLORIDE: 600; 310; 30; 20 INJECTION, SOLUTION INTRAVENOUS at 05:26

## 2020-02-18 RX ADMIN — PIPERACILLIN AND TAZOBACTAM 3.38 G: 3; .375 INJECTION, POWDER, FOR SOLUTION INTRAVENOUS at 08:58

## 2020-02-18 RX ADMIN — ACETYLCYSTEINE 2 ML: 200 SOLUTION ORAL; RESPIRATORY (INHALATION) at 08:42

## 2020-02-18 RX ADMIN — IPRATROPIUM BROMIDE AND ALBUTEROL SULFATE 3 ML: .5; 3 SOLUTION RESPIRATORY (INHALATION) at 18:11

## 2020-02-18 RX ADMIN — MINERAL SUPPLEMENT IRON 300 MG / 5 ML STRENGTH LIQUID 100 PER BOX UNFLAVORED 220 MG: at 09:26

## 2020-02-18 RX ADMIN — METOCLOPRAMIDE 5 MG: 5 SOLUTION ORAL at 09:00

## 2020-02-18 RX ADMIN — Medication 40 MG: at 09:01

## 2020-02-18 RX ADMIN — CARBAMAZEPINE 150 MG: 100 SUSPENSION ORAL at 15:52

## 2020-02-18 RX ADMIN — HYDROCORTISONE 20 MG: 10 TABLET ORAL at 08:59

## 2020-02-18 ASSESSMENT — ACTIVITIES OF DAILY LIVING (ADL)
ADLS_ACUITY_SCORE: 41
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
## 2020-02-18 NOTE — ED NOTES
[facility]  ED Nurse Handoff Report    ED Chief complaint: Fever      ED Diagnosis:   Final diagnoses:   Sepsis, due to unspecified organism, unspecified whether acute organ dysfunction present (H)   Aspiration pneumonia of both lower lobes, unspecified aspiration pneumonia type (H)       Code Status: Full Code    Allergies:   Allergies   Allergen Reactions     Dilantin [Phenytoin Sodium]      Valproic Acid      Toxicity c bone marrow suspension, elevated ammonia levels        Patient Story: Pt. Brought from home having low grade fever. Recent admission for pneumonia.   Focused Assessment:  Pt. Is total care, having congested cough with bibasilar course lung sounds.     Treatments and/or interventions provided: NaCl times 2 liters, Zosyn 3.375 grams IVPB, Solucortef 50mg IVP.   Patient's response to treatments and/or interventions: Pt. Resting in room with intermittant cough.     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 Is non-verbal   What interventions are required if any?               Family Comments: None present  OBS brochure/video discussed/provided to patient/family: N/A              Name of person given brochure if not patient:                Relationship to patient:      For the majority of the shift this patient's behavior was Green.   Behavioral interventions performed were None.    ED NURSE  PHONE NUMBER: [phone]

## 2020-02-18 NOTE — PROGRESS NOTES
[facility]  Patient is currently open to home care services with [facility]. The patient is currently receiving Physical therapy and SLP services. [facility]  and team have been notified of patient admission. [facility] liaison will continue to follow patient during stay. If appropriate provide orders to resume home care at time of discharge.

## 2020-02-18 NOTE — PLAN OF CARE
Discharge Planner SLP   Patient plan for discharge: Not addressed.   Current status: Patient is well known to this department on multiple admissions for continued aspiration pneumonia's since 2011. Recently here for aspiration pneumonia on 2/9/20. Recent video at another hospital revealed aspiration of honey thick liquids and pudding. His swallow function continues to be severely impaired and do not anticipate any improvement. Recommend: 1. NPO and receive nutrition via the PEG tube. No further skilled intervention is indicated. Will complete the orders.   Barriers to return to prior living situation: No ST needs.   Recommendations for discharge: Per medical team.   Rationale for recommendations: Repeat OP video could be considered when he does not have an active pneumonia, but do not anticipate improvement given his severity.         Entered by: Heather Adler 02/18/2020 3:10 PM

## 2020-02-18 NOTE — PHARMACY-CONSULT NOTE
Pharmacy Tube Feeding Consult     Medication reviewed for administration by feeding tube and for potential food/drug interactions.     Recommendation: No changes are needed at this time.      Pharmacy will continue to follow as new medications are ordered.    Bonny Bosch RPH

## 2020-02-18 NOTE — PLAN OF CARE
RECEIVING UNIT ED HANDOFF REVIEW    ED Nurse Handoff Report was reviewed by: Lisa Mao RN on February 18, 2020 at 2:48 AM

## 2020-02-18 NOTE — ED NOTES
Bed: ED14  Expected date:   Expected time:   Means of arrival:   Comments:  E1 - 52M Low grade fever ETA 2045

## 2020-02-18 NOTE — ED PROVIDER NOTES
History     Chief Complaint:  Fever    HPI  Keyon Farias is a 59 year old male with a history of aspiration pneumonia, TBI, DVT, ventricular fibrillation, septic shock, seizures, stroke who presents to the emergency department for evaluation of fever. The EMS state that they were called for concern of a fever, which was recorded at 99.8. The patient was recently admitted to the emergency department on 02/09/2020 for similar symptoms and discharged on 2/12/2020 treatment for aspiration pneumonia and associated sepsis; was prescribed Augmentin at discharge.  Here in the ED patient is nonverbal at baseline and is unable to provide any additional history.  He is noted to be tachycardic and borderline febrile.    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  TBI  DVT of upper extremity  GERD  Panhypopituitarism  Pneumonia  Seizures   Septic shock  Thyroid disease  Tracheostomy care  Cerebral artery occlusion with cerebral infarction  UTI  Ventricular fibrillation     Past Surgical History:    EGD combined x3  Reconstructive facial surgery   Jejunostomy tube placement  Appendectomy  IR PICC placement, left   Insertion tube gastrotomy  Right hand repair  Tracheostomy  Vascular surgery     Family History:    Cancer      Social History:  Smoking status: Former: Quit date: 1989  Alcohol use: No  Drug use: No   The patient presents to the emergency department alone  PCP: Carlos Gomez  Marital Status:  Single [1]    Review of Systems   Unable to perform ROS: Patient nonverbal     Physical Exam     Patient Vitals for the past 24 hrs:   BP Temp Temp src Pulse Resp SpO2   02/18/20 0030 101/65 -- -- 116 -- 93 %   02/18/20 0015 -- 99.4  F (37.4  C) Temporal -- -- --   02/17/20 2300 -- -- -- 131 -- 95 %   02/17/20 2057 123/82 100  F (37.8  C) Temporal 120 20 95 %        Physical Exam  General: Alert and unable to cooperate with exam . Baseline mentation, non-verbal at baseline  Head:  Atraumatic.  History of TBI   Eyes:  No scleral icterus, PERRL  ENT:  The external nose and ears are normal. The oropharynx is normal and without erythema; mucus membranes are dry.   Neck:  Normal range of motion without rigidity.  CV:  Tachycardic, regular rhythm  Resp:  Coarse lung sounds in bases bilaterally    Non-labored, no retractions or accessory muscle use  GI:  Abdomen is soft, no distension, no tenderness. No peritoneal signs  MS:  No lower extremity edema.  Extremities with chronic contracture  Skin:  Warm and dry, No rash or lesions noted.  Neuro: No gross motor deficits. G2 present        Emergency Department Course   Imaging:  Radiology findings were communicated with the patient who voiced understanding of the findings.    XR Chest 2 Views  IMPRESSION: Normal heart size and pulmonary vascularity. No acute-appearing infiltrates or consolidation. The left costophrenic angle is not included on this study. Elevation right hemidiaphragm. No significant bony abnormalities. Chest is otherwise   negative.  As read by Radiology.    Laboratory:  Laboratory findings were communicated with the patient who voiced understanding of the findings.    Blood culture: pending    Labs Ordered and Resulted from Time of ED Arrival Up to the Time of Departure from the ED   CBC WITH PLATELETS DIFFERENTIAL - Abnormal; Notable for the following components:       Result Value    WBC 17.1 (*)     Hemoglobin 12.9 (*)     Hematocrit 38.9 (*)     RDW 16.1 (*)     Absolute Neutrophil 12.5 (*)     All other components within normal limits   COMPREHENSIVE METABOLIC PANEL - Abnormal; Notable for the following components:    Calcium 8.2 (*)     Albumin 2.9 (*)     All other components within normal limits   ROUTINE UA WITH MICROSCOPIC - Abnormal; Notable for the following components:    pH Urine 8.0 (*)     Protein  Albumin Urine 10 (*)     All other components within normal limits   LACTIC ACID WHOLE BLOOD   PROCALCITONIN   PULSE OXIMETRY NURSING   CARDIAC CONTINUOUS MONITORING   STRICT INTAKE AND OUTPUT   PERIPHERAL IV CATHETER   BLOOD CULTURE   BLOOD CULTURE   INFLUENZA A/B ANTIGEN       Interventions:  Medications   sodium chloride (PF) 0.9% PF flush 3 mL (has no administration in time range)   sodium chloride (PF) 0.9% PF flush 3 mL (has no administration in time range)   piperacillin-tazobactam (ZOSYN) 3.375 g vial to attach to  mL bag (has no administration in time range)   0.9% sodium chloride BOLUS (has no administration in time range)   hydrocortisone sodium succinate PF (solu-CORTEF) injection 50 mg (has no administration in time range)   0.9% sodium chloride BOLUS (1,000 mLs Intravenous New Bag 2/17/20 2300)       Emergency Department Course:  Past medical records, nursing notes, and vitals reviewed.  2154: I performed an exam of the patient and obtained history, as documented above.     IV inserted and blood drawn.    Urinalysis and culture obtained and sent for evaluation.    The patient was sent for a Chest XR while in the emergency department, results above.     Findings and plan explained to the Patient who consents to admission. Discussed the patient with Dr. Motta, who will admit the patient to a medical bed for further monitoring, evaluation, and treatment.  Impression & Plan     Medical Decision Making:  Patient is a 59-year-old male with history of recurrent aspiration pneumonia; history of TBI and lives in group home.  Presents today with reported fever.  Patient's medical history and records were reviewed.  On evaluation patient was borderline febrile and tachycardic.  He had coarse lung sounds though maintain normal oxygen saturations.  Labs obtained and demonstrate significant elevated white count.  Urine obtained without evidence of infection.  Blood cultures obtained.  Patient's chest x-ray  was read as radiology without acute change though patient is noted to have evidence of persistent consolidation of the lung bases and given history of aspiration pneumonia there is concern for worsening/persistent disease.  Influenza testing negative.  He was provided 50 mg hydrocortisone as stress dose steroids and started on Zosyn.  Additionally received IV fluids.  Patient's care was discussed with his mother who can be reached at [phone].  Patient will be admitted to the hospitalist service for further evaluation and care.    Diagnosis:    ICD-10-CM    1. Sepsis, due to unspecified organism, unspecified whether acute organ dysfunction present (H) A41.9    2. Aspiration pneumonia of both lower lobes, unspecified aspiration pneumonia type (H) J69.0        Disposition:  Admitted    Deborah Reyna  2/17/2020    EMERGENCY DEPARTMENT    Scribe Disclosure:  Deborah DYKES, am serving as a scribe at 9:54 PM on 2/17/2020 to document services personally performed by Edgar Miles DO based on my observations and the provider's statements to me.        Edgar Miles DO  02/18/20 0120

## 2020-02-18 NOTE — CONSULTS
CLINICAL NUTRITION SERVICES  -  ASSESSMENT NOTE    Recommendations Ordered by Registered Dietitian (RD):   Type of Feeding Tube: GJ tube (feed in J-port)  Enteral Frequency:  12 hr cycle  Enteral Regimen: Isosource 1.5 @ 100 mL/hr x 12 hrs (7am-7pm)  Total Enteral Provisions: 1800 cals (24 cals/kg), 82 gm pro (1.1 gm/kg, 91% est needs), 18 gm fiber, 912 mL free water  Nutrisource fiber 1 packet daily = 15 cals, 3 gm fiber  TOTAL: 1815 cals (24 cals/kg, 97% est needs), 21 gm fiber  Free Water Flush:  120 mL every 4 hrs   Malnutrition: (2/18)   % Weight Loss:  Weight loss does not meet criteria for malnutrition   % Intake:  Decreased intake does not meet criteria for malnutrition   Subcutaneous Fat Loss:  None observed - 2/10  Muscle Loss:  Baseline low muscle stores w/ hemiplegia and TBI - 2/10  Fluid Retention:  None noted    Malnutrition Diagnosis: Patient does not meet two of the above criteria necessary for diagnosing malnutrition     REASON FOR ASSESSMENT  Keyon Farias is a 59 year old male seen by Registered Dietitian for Provider Order - Registered Dietitian to Assess and Order TF per Medical Nutrition Therapy Protocol    NUTRITION HISTORY  - Information obtained from chart review. Pt just discharged from this facility on 2/12.  - Patient w/ frequent admissions - last 2/9.   - Relied on GJT for 100% of nutrition needs.   - Takes NeutraPhos TID at baseline.     --> last known regimen, provided during last admission and approved by patient's mother at that time:     Type of Feeding Tube: GJ tube (feed in J-port)  Enteral Frequency:  12 hr cycle  Enteral Regimen: Isosource 1.5 @ 100 mL/hr x 12 hrs (7am-7pm)  Total Enteral Provisions: 1800 cals (24 cals/kg), 82 gm pro (1.1 gm/kg, 91% est needs), 18 gm fiber, 912 mL free water  Nutrisource fiber 1 packet daily = 15 cals, 3 gm fiber  TOTAL: 1815 cals (24 cals/kg, 97% est needs), 21 gm fiber  Free Water Flush:  120 mL every 4 hrs    CURRENT NUTRITION ORDERS  Diet  "Order:     NPO     Current Intake/Tolerance:  NA - previously documented to hold EN x24 hours per MD      NUTRITION FOCUSED PHYSICAL ASSESSMENT FOR DIAGNOSING MALNUTRITION)  No:  Done 2/10         Observed:    No nutrition-related physical findings observed    Obtained from Chart/Interdisciplinary Team:  \"Pre-existing healing pressure ulcer on coccyx, pink non-blanchable\"    ANTHROPOMETRICS  Height:  6'0\"  Weight:  72.9 kg (160#)(2/12)  IBW:  80.9 kg (90%)  BMI:  21.8 kg/m^2  Weight History:   Wt Readings from Last 10 Encounters:   02/12/20 72.9 kg (160 lb 11.5 oz)   01/30/20 74.8 kg (165 lb)   01/29/20 74.8 kg (165 lb)   01/02/20 71.7 kg (158 lb)   12/25/19 75.1 kg (165 lb 9.1 oz)   12/18/19 76.7 kg (169 lb)   11/15/19 72.6 kg (160 lb)   11/11/19 72.9 kg (160 lb 12.8 oz)   10/28/19 73.6 kg (162 lb 4.1 oz)   10/13/19 68.9 kg (152 lb)       LABS  Labs reviewed  Electrolytes  Potassium (mmol/L)   Date Value   02/18/2020 3.9   02/12/2020 4.0   02/10/2020 4.0     Phosphorus (mg/dL)   Date Value   01/02/2020 2.9   12/30/2019 2.3 (L)   10/28/2019 2.7   10/10/2019 Canceled, Test credited   10/10/2019 2.9    Blood Glucose  Glucose (mg/dL)   Date Value   02/18/2020 88   02/12/2020 84   02/10/2020 94   02/09/2020 117 (H)   01/03/2020 88     Hemoglobin A1C (%)   Date Value   08/12/2019 6.1 (H)   05/07/2019 6.1 (H)   11/22/2018 6.3 (H)   02/15/2018 6.6 (H)   11/24/2016 5.1    Inflammatory Markers  CRP Inflammation (mg/L)   Date Value   10/26/2019 87.3 (H)   01/31/2019 71.1 (H)   02/06/2017 87.0 (H)     WBC (10e9/L)   Date Value   02/18/2020 17.1 (H)   02/11/2020 9.5   02/10/2020 13.1 (H)     Albumin (g/dL)   Date Value   02/18/2020 2.9 (L)   02/09/2020 3.1 (L)   01/02/2020 2.5 (L)      Magnesium (mg/dL)   Date Value   12/30/2019 2.2   12/18/2019 2.2   10/28/2019 2.1     Sodium (mmol/L)   Date Value   02/18/2020 133   02/12/2020 136   02/10/2020 136    Renal  Urea Nitrogen (mg/dL)   Date Value   02/18/2020 18   02/12/2020 15 "   02/10/2020 16     Creatinine (mg/dL)   Date Value   02/18/2020 0.73   02/12/2020 1.08   02/10/2020 0.94     Additional  Triglycerides (mg/dL)   Date Value   11/29/2016 100     Ketones Urine (mg/dL)   Date Value   02/17/2020 Negative         MEDICATIONS  Medications reviewed  NeutraPhos TID  Vitaimn C 1000 mg daily   Vitamin D3 2000 units   LR @ 100 mL/hr     ASSESSED NUTRITION NEEDS PER APPROVED PRACTICE GUIDELINES:  Dosing Weight 72.9 kg  Estimated Energy Needs: 6020-6271 kcals (25-30 Kcal/Kg)  Justification: maintenance  Estimated Protein Needs:  grams protein (1.2-1.5 g pro/Kg)  Justification: preservation of lean body mass  Estimated Fluid Needs: 1 mL/kcal  Justification: maintenance    MALNUTRITION:  % Weight Loss:  Weight loss does not meet criteria for malnutrition   % Intake:  Decreased intake does not meet criteria for malnutrition   Subcutaneous Fat Loss:  None observed  Muscle Loss:  Baseline low muscle stores w/ hemiplegia and TBI  Fluid Retention:  None noted    Malnutrition Diagnosis: Patient does not meet two of the above criteria necessary for diagnosing malnutrition    NUTRITION DIAGNOSIS:  Inadequate protein-energy intake related to hospitalization as evidenced by EN on hold x1 day, plan to restart.       NUTRITION INTERVENTIONS  Recommendations / Nutrition Prescription  Per EN regimen run last admission (approved by mother at that time, 2/10/2020)    Type of Feeding Tube: GJ tube (feed in J-port)  Enteral Frequency:  12 hr cycle  Enteral Regimen: Isosource 1.5 @ 100 mL/hr x 12 hrs (7am-7pm)  Total Enteral Provisions: 1800 cals (24 cals/kg), 82 gm pro (1.1 gm/kg, 91% est needs), 18 gm fiber, 912 mL free water  Nutrisource fiber 1 packet daily = 15 cals, 3 gm fiber  TOTAL: 1815 cals (24 cals/kg, 97% est needs), 21 gm fiber  Free Water Flush:  120 mL every 4 hrs    Implementation  Nutrition education: No education needs assessed at this time  EN Composition, EN Schedule and Feeding Tube  Flush: see above      Nutrition Goals  EN to provide % estimated needs      MONITORING AND EVALUATION:  Progress towards goals will be monitored and evaluated per protocol and Practice Guidelines    Marisel Fernández RD, LD  [facility] [place], [address], [address], [facility]   Pager: [phone]  Weekend Pager: [phone]

## 2020-02-18 NOTE — PLAN OF CARE
"Pt is mostly nonverbal, with exception of noises that I was not able to interpret. Unable to assess orientation level. Pt can occasionally respond with thumbs up or thumbs down. Pt requests \"fist bumps\" when he is especially pleased. He enjoys watching the aquarium relaxation channel. Pre-existing healing pressure ulcer on coccyx, pink non-blanchable. Sacral Mepilix applied. Coarse lung sounds. On room air. No obvious signs of pain or discomfort. Turn and repo q 2 hours. Incontinent, changed when turned. Tolerates being moved well. Low grade fever (99) overnight.   "

## 2020-02-18 NOTE — PROGRESS NOTES
Interval Hospitalist Note    Patient is a 60yo male with PMhx of of TBI nonverbal at baseline with chronic  R sided spastic hemiplegia, dysphagia with G-tube for TFs/meds, seizure disorder, panhypopituitarism and frequent hospital admissions for recurrent pneumonia. Well known to the hospitalist service. Was just admitted here from 2/9/20 -  2/12/20 for management of recurrent aspiration pneumonia. Discharged on course of Augmentin. Blood culture that stay grew gm+ bacilli resembling diphtheroids.     Admitted per Dr. Motta earlier this morning with fever. Clinical picture of sepsis dt recurrent aspiration pneumonia (febrile, tachycardic, coarse BS with WBC 17.1). CXR showed persisted bibasilar consolidations but no new infiltrate. Flu swab neg. Lactate 1.7. Procal 0.1 (low risk). Started on treatment with Zosyn and given dose of IV hydrocortisone given hx of panhypopituitarism.      Discussed with bedside RN this afternoon. Clinical condition remains stable today. Afebrile, HR stable, BPs soft (90-100s systolic).     Cont plan per Dr. Motta including Zosyn, hydrocortisone, nebs, seizure meds. Cont LR @100ml/h. TFs run 7a-7p. Will hold today per Dr. Motta, okay to resume tomorrow at 0700.     Full Code    Sun Keita, DO  Internal Medicine - Hospitalist  2/18/2020  4:00 PM

## 2020-02-19 LAB
ANION GAP SERPL CALCULATED.3IONS-SCNC: 5 MMOL/L (ref 3–14)
BUN SERPL-MCNC: 12 MG/DL (ref 7–30)
CALCIUM SERPL-MCNC: 8.6 MG/DL (ref 8.5–10.1)
CHLORIDE SERPL-SCNC: 111 MMOL/L (ref 94–109)
CO2 SERPL-SCNC: 25 MMOL/L (ref 20–32)
CREAT SERPL-MCNC: 0.78 MG/DL (ref 0.66–1.25)
ERYTHROCYTE [DISTWIDTH] IN BLOOD BY AUTOMATED COUNT: 16.3 % (ref 10–15)
GFR SERPL CREATININE-BSD FRML MDRD: >90 ML/MIN/{1.73_M2}
GLUCOSE SERPL-MCNC: 157 MG/DL (ref 70–99)
HCT VFR BLD AUTO: 38.4 % (ref 40–53)
HGB BLD-MCNC: 12.1 G/DL (ref 13.3–17.7)
MCH RBC QN AUTO: 26.9 PG (ref 26.5–33)
MCHC RBC AUTO-ENTMCNC: 31.5 G/DL (ref 31.5–36.5)
MCV RBC AUTO: 85 FL (ref 78–100)
PHOSPHATE SERPL-MCNC: 1.7 MG/DL (ref 2.5–4.5)
PHOSPHATE SERPL-MCNC: 2.4 MG/DL (ref 2.5–4.5)
PLATELET # BLD AUTO: 189 10E9/L (ref 150–450)
POTASSIUM SERPL-SCNC: 3.4 MMOL/L (ref 3.4–5.3)
RBC # BLD AUTO: 4.5 10E12/L (ref 4.4–5.9)
SODIUM SERPL-SCNC: 141 MMOL/L (ref 133–144)
WBC # BLD AUTO: 6.5 10E9/L (ref 4–11)

## 2020-02-19 PROCEDURE — 40000275 ZZH STATISTIC RCP TIME EA 10 MIN

## 2020-02-19 PROCEDURE — 25000128 H RX IP 250 OP 636: Performed by: INTERNAL MEDICINE

## 2020-02-19 PROCEDURE — G0463 HOSPITAL OUTPT CLINIC VISIT: HCPCS

## 2020-02-19 PROCEDURE — 94640 AIRWAY INHALATION TREATMENT: CPT

## 2020-02-19 PROCEDURE — 25800030 ZZH RX IP 258 OP 636: Performed by: INTERNAL MEDICINE

## 2020-02-19 PROCEDURE — 84100 ASSAY OF PHOSPHORUS: CPT | Performed by: INTERNAL MEDICINE

## 2020-02-19 PROCEDURE — 80048 BASIC METABOLIC PNL TOTAL CA: CPT | Performed by: INTERNAL MEDICINE

## 2020-02-19 PROCEDURE — 36415 COLL VENOUS BLD VENIPUNCTURE: CPT | Performed by: INTERNAL MEDICINE

## 2020-02-19 PROCEDURE — 94640 AIRWAY INHALATION TREATMENT: CPT | Mod: 76

## 2020-02-19 PROCEDURE — 25000125 ZZHC RX 250: Performed by: INTERNAL MEDICINE

## 2020-02-19 PROCEDURE — 40000809 ZZH STATISTIC NO DOCUMENTATION TO SUPPORT CHARGE

## 2020-02-19 PROCEDURE — 25000132 ZZH RX MED GY IP 250 OP 250 PS 637: Mod: GY | Performed by: INTERNAL MEDICINE

## 2020-02-19 PROCEDURE — 85027 COMPLETE CBC AUTOMATED: CPT | Performed by: INTERNAL MEDICINE

## 2020-02-19 PROCEDURE — 99232 SBSQ HOSP IP/OBS MODERATE 35: CPT | Performed by: INTERNAL MEDICINE

## 2020-02-19 PROCEDURE — 27210429 ZZH NUTRITION PRODUCT INTERMEDIATE LITER

## 2020-02-19 PROCEDURE — 12000000 ZZH R&B MED SURG/OB

## 2020-02-19 RX ADMIN — PIPERACILLIN AND TAZOBACTAM 3.38 G: 3; .375 INJECTION, POWDER, FOR SOLUTION INTRAVENOUS at 23:08

## 2020-02-19 RX ADMIN — IPRATROPIUM BROMIDE AND ALBUTEROL SULFATE 3 ML: .5; 3 SOLUTION RESPIRATORY (INHALATION) at 15:45

## 2020-02-19 RX ADMIN — ACETYLCYSTEINE 2 ML: 200 SOLUTION ORAL; RESPIRATORY (INHALATION) at 11:41

## 2020-02-19 RX ADMIN — SODIUM CHLORIDE, POTASSIUM CHLORIDE, SODIUM LACTATE AND CALCIUM CHLORIDE: 600; 310; 30; 20 INJECTION, SOLUTION INTRAVENOUS at 04:42

## 2020-02-19 RX ADMIN — POTASSIUM & SODIUM PHOSPHATES POWDER PACK 280-160-250 MG 1 PACKET: 280-160-250 PACK at 15:17

## 2020-02-19 RX ADMIN — POTASSIUM PHOSPHATE, MONOBASIC AND POTASSIUM PHOSPHATE, DIBASIC 15 MMOL: 224; 236 INJECTION, SOLUTION INTRAVENOUS at 20:28

## 2020-02-19 RX ADMIN — PIPERACILLIN AND TAZOBACTAM 3.38 G: 3; .375 INJECTION, POWDER, FOR SOLUTION INTRAVENOUS at 16:26

## 2020-02-19 RX ADMIN — IPRATROPIUM BROMIDE AND ALBUTEROL SULFATE 3 ML: .5; 3 SOLUTION RESPIRATORY (INHALATION) at 23:22

## 2020-02-19 RX ADMIN — ASPIRIN 81 MG 81 MG: 81 TABLET ORAL at 09:04

## 2020-02-19 RX ADMIN — OXYCODONE HYDROCHLORIDE AND ACETAMINOPHEN 1000 MG: 500 TABLET ORAL at 09:04

## 2020-02-19 RX ADMIN — HYDROCORTISONE 10 MG: 10 TABLET ORAL at 15:16

## 2020-02-19 RX ADMIN — IPRATROPIUM BROMIDE AND ALBUTEROL SULFATE 3 ML: .5; 3 SOLUTION RESPIRATORY (INHALATION) at 00:01

## 2020-02-19 RX ADMIN — CARBAMAZEPINE 150 MG: 100 SUSPENSION ORAL at 09:03

## 2020-02-19 RX ADMIN — ACETYLCYSTEINE 2 ML: 200 SOLUTION ORAL; RESPIRATORY (INHALATION) at 20:08

## 2020-02-19 RX ADMIN — HYDROCORTISONE 20 MG: 10 TABLET ORAL at 09:04

## 2020-02-19 RX ADMIN — METOCLOPRAMIDE 5 MG: 5 SOLUTION ORAL at 20:28

## 2020-02-19 RX ADMIN — IPRATROPIUM BROMIDE AND ALBUTEROL SULFATE 3 ML: .5; 3 SOLUTION RESPIRATORY (INHALATION) at 07:37

## 2020-02-19 RX ADMIN — CARBAMAZEPINE 150 MG: 100 SUSPENSION ORAL at 15:16

## 2020-02-19 RX ADMIN — PIPERACILLIN AND TAZOBACTAM 3.38 G: 3; .375 INJECTION, POWDER, FOR SOLUTION INTRAVENOUS at 02:52

## 2020-02-19 RX ADMIN — BRIVARACETAM 100 MG: 10 SOLUTION ORAL at 09:14

## 2020-02-19 RX ADMIN — SODIUM BICARBONATE 650 MG TABLET 650 MG: at 09:03

## 2020-02-19 RX ADMIN — POTASSIUM PHOSPHATE, MONOBASIC AND POTASSIUM PHOSPHATE, DIBASIC 20 MMOL: 224; 236 INJECTION, SOLUTION INTRAVENOUS at 12:17

## 2020-02-19 RX ADMIN — LEVOTHYROXINE SODIUM 150 MCG: 150 TABLET ORAL at 09:04

## 2020-02-19 RX ADMIN — Medication 40 MG: at 09:03

## 2020-02-19 RX ADMIN — CARBAMAZEPINE 150 MG: 100 SUSPENSION ORAL at 20:28

## 2020-02-19 RX ADMIN — IPRATROPIUM BROMIDE AND ALBUTEROL SULFATE 3 ML: .5; 3 SOLUTION RESPIRATORY (INHALATION) at 20:08

## 2020-02-19 RX ADMIN — ACETYLCYSTEINE 2 ML: 200 SOLUTION ORAL; RESPIRATORY (INHALATION) at 15:46

## 2020-02-19 RX ADMIN — CARBAMAZEPINE 150 MG: 100 SUSPENSION ORAL at 02:52

## 2020-02-19 RX ADMIN — ACETYLCYSTEINE 2 ML: 200 SOLUTION ORAL; RESPIRATORY (INHALATION) at 07:37

## 2020-02-19 RX ADMIN — POTASSIUM & SODIUM PHOSPHATES POWDER PACK 280-160-250 MG 1 PACKET: 280-160-250 PACK at 21:16

## 2020-02-19 RX ADMIN — PIPERACILLIN AND TAZOBACTAM 3.38 G: 3; .375 INJECTION, POWDER, FOR SOLUTION INTRAVENOUS at 09:04

## 2020-02-19 RX ADMIN — METOCLOPRAMIDE 5 MG: 5 SOLUTION ORAL at 09:01

## 2020-02-19 RX ADMIN — BRIVARACETAM 100 MG: 10 SOLUTION ORAL at 21:16

## 2020-02-19 RX ADMIN — CHOLECALCIFEROL TAB 50 MCG (2000 UNIT) 2000 UNITS: 50 TAB at 09:03

## 2020-02-19 RX ADMIN — MINERAL SUPPLEMENT IRON 300 MG / 5 ML STRENGTH LIQUID 100 PER BOX UNFLAVORED 220 MG: at 09:04

## 2020-02-19 RX ADMIN — POTASSIUM & SODIUM PHOSPHATES POWDER PACK 280-160-250 MG 1 PACKET: 280-160-250 PACK at 09:03

## 2020-02-19 RX ADMIN — IPRATROPIUM BROMIDE AND ALBUTEROL SULFATE 3 ML: .5; 3 SOLUTION RESPIRATORY (INHALATION) at 11:41

## 2020-02-19 ASSESSMENT — ACTIVITIES OF DAILY LIVING (ADL)
ADLS_ACUITY_SCORE: 41

## 2020-02-19 NOTE — PLAN OF CARE
Vital Signs stable. Pt is nonverbal but appeared very happy today, pt was smiling a lot and giving staff fist bumps. Pt also enjoyed watching the fish relaxation channel. Lung sounds diminished throughout. Bowel sounds active and audible, pt is incontinent of stool, pt had large soft BM. Passing flatus. Incontinent of urine. NPO diet. Up with mechanical lift, pt was turned and repositioned every 2 hours. Pt does not appear to be in any pain. Report given to station 66 nurse. Zonia mattress sent to  and pt transferred.

## 2020-02-19 NOTE — PROGRESS NOTES
[facility] WO Nurse Inpatient skin Assessment   Initial Assessment of buttocks    WOC NURSE ASSESSMENT    Consult for pressure injury- unknown area- buttocks with intact epidermis other than a dime sized area found on the Right IT area, this spot is more due to IAD from friction and moisture vs pressure  WO NURSE TREATMENT PLAN    Plan of care for wound located R IT/ buttocks- effective now  -NO DRESSINGS TO COCCYX AREA, LEAVE OPEN TO THE AIR  - TID and PRN perineal cares with Rita Cleanse and Protect, dust with baby powder  - POSITION PT SIDE TO SIDE ONLY EVERY 2 HOURS, WHEN IN BED  - HEELS ELEVATED AT ALL TIMES WITH AT LEAST ONE PILLOW UNDER EACH LEG, ASSURE HEELS FLOATING  - MAKE SURE TO USE AT LEAST 5-6 PILLOWS IN THE BED AT ALL TIMES TO ACHIEVE GOOD POSITIONING  - PT IS NOT CLEAN UNTIL YOU SEE THE BASE OF THE SKIN FOLD AND WIPES COME AWAY CLEAN- then dust with baby powder  NOTE- when up to the chair pt needs to be on a chair cushion, not pillows.  Pillows are NOT pressure relieving.  If pt is at risk or with known PIs they need to be on a chair cushion (#[id number]).  Do not sit for more than 2 hours at a time    Nursing to notify Provider(s) and re-consult the WO Nurse if wound(s) deteriorate or new skin concerns    WO Nurse follow-up plan: weekly  OBJECTIVE DATA    Patient history according to provider notes: Patient is a 58yo male with PMhx of of TBI nonverbal at baseline with chronic  R sided spastic hemiplegia, dysphagia with G-tube for TFs/meds, seizure disorder, panhypopituitarism and frequent hospital admissions for recurrent pneumonia. Well known to the hospitalist service. Was just admitted here from 2/9/20 -  2/12/20 for management of recurrent aspiration pneumonia. Discharged on course of Augmentin. Blood culture that stay grew gm+ bacilli resembling diphtheroids    Ricci Risk Assessment  Sensory Perception: 2-->very limited    Moisture: 2-->very moist   Activity: 1-->bedfast     Mobility: 2-->very  limited   Nutrition: 3-->adequate   Friction and Shear: 1-->problem      Positioning: Pillows,     Mattress:  Standard , Low air loss mattress with pulsation     Current diet  Orders Placed This Encounter      NPO for Medical/Clinical Reasons Except for: Meds      Moisture Management:  Diaper  o Catheter secured? Not applicable     o I/O last 3 completed shifts:  o In: 2769.67 [I.V.:2099.67; NG/GT:670]  o Out: -     Labs:   Recent Labs   Lab Test 02/19/20  0857 02/18/20  0009  01/01/20  0735  10/26/19  1530  08/12/19  2200   ALBUMIN  --  2.9*   < > 2.5*   < >  --    < >  --    HGB 12.1* 12.9*   < > 11.1*   < >  --    < >  --    INR  --   --   --  1.28*  --   --   --   --    WBC 6.5 17.1*   < > 6.7   < >  --    < >  --    A1C  --   --   --   --   --   --   --  6.1*   CRP  --   --   --   --   --  87.3*  --   --     < > = values in this interval not displayed.         WOC NURSE PHYSICAL EXAM     Assessment of (location):   buttocks  Wound History:  Pt wears briefs, nonverbal, combative  Date of last photo feb 19, 2020    Epidermis intact with blanchable pink erythema, other than a 0.7cm x 0.7cm x 0.2cm spot on the R IT, no drainage    WOC NURSE INTERVENTIONS    Assessed buttocks with the help of the NA as noted above  Wound Care: none needed  Supplies: reviewed, gathered, placed at the bedside, discussed with RN and discussed with patient  Discussed plan of care with Nurse  Education provided: importance of repositioning, plan of care, wound progress, Infection prevention , Moisture management, Hygiene and Off-loading pressure    Brianna Michelle RN

## 2020-02-19 NOTE — PROGRESS NOTES
DATE & TIME: 2/19/2020 9915-6097    Cognitive Concerns/ Orientation : HECTOR, pt non-verbal at baseline. Pt smiling and laughing throughout shift   BEHAVIOR & AGGRESSION TOOL COLOR: green  CIWA SCORE: na   ABNL VS/O2: VSS on RA  MOBILITY: Ax2 with lift. Total care - T/R q2hrs   PAIN MANAGEMENT: Denied on shift, non-verbal pain indicators also absent   DIET: NPO, tube feeding to begin at 0700 on 2/19/2020, ending at 1900.   BOWEL/BLADDER: Incontinent of B&B, no BM on shift. Voiding frequently  BNL LAB/BG: WBC 17.1  DRAIN/DEVICES: PIV infusing LR @ 100ml/hr  TELEMETRY RHYTHM: na  SKIN: Blanchable redness to coccyx. Has pre-existing wound - WOC consult placed. Scattered bruising, pale.   TESTS/PROCEDURES: na  D/C DAY/GOALS/PLACE: pending clinical improvement   OTHER IMPORTANT INFO: continue to monitor. Afebrile during shift. IV zosyn. Pt down 10 lb since last admission, both bed weights; unknown if it was just more items being left on bed previous admission.

## 2020-02-19 NOTE — PROGRESS NOTES
Patient given nebulizer treatments as ordered. BA are clear but diminished pre and post neb. SpO2 is 96% on room air.

## 2020-02-19 NOTE — CONSULTS
Care Transition Initial Assessment - RN        Met with: Patient.  DATA   Active Problems:    Aspiration pneumonia (H)       Cognitive Status: Nonverbal hx of TBI.     Contact information and PCP information verified: Yes  Lives With: Mother     Quality of Family Relationships: supportive              Insurance concerns: No Insurance issues identified  ASSESSMENT  Patient currently receives the following services: [facility] (P:[phone]) (Fax:[phone])for RN 12 Hr coverage daily and also receives a 12 hr PCA adarsh/night through [facility] (currently 80.5 hours every 7 days) and TF with supplies through [facility].   Patient is currently open to home care services with [facility]. The patient is currently receiving Physical therapy and SLP services. [facility]  and team have been notified of patient admission. [facility] liaison will continue to follow patient during stay        Identified issues/concerns regarding health management: recurrent aspiration pneumonia.  PLAN  Financial costs for the patient include co-pays .  Patient given options and choices for discharge TBD .  Patient/family is agreeable to the plan?  TBD pt's mother has recently had knee surgery.  Assumed plan is he would go back home with her and continue services through [facility].    Patient anticipates discharging to Home with services .        Patient anticipates needs for home equipment: no change in home equipment  Transportation/person available to transport on day of discharge  is [employer] [facility] [employer] and have they been notified/set up Ride on Friday, 2/21/20 at 10:30 AM.    Plan/Disposition: Home   Appointments:  PCP appointment made with Carlos Cope MD, [facility] on Thursday 2/27 at 1:30PM.  Phone #[phone].      Care  (CTS) will continue to follow as needed.        Anali Tyson RN, Care Coordinator

## 2020-02-19 NOTE — PROGRESS NOTES
MD Notification    Notified Person: MD    Notified Person Name: Sun Keita    Notification Date/Time: 2/19/20 0945    Notification Interaction: Web page    Purpose of Notification: FYI phos 1.7, no protocol in place    Orders Received: protocol placed    Comments:

## 2020-02-19 NOTE — PLAN OF CARE
DATE & TIME: 1514-2746    Cognitive Concerns/ Orientation : Nonverbal, HECTOR orientation   BEHAVIOR & AGGRESSION TOOL COLOR: Yellow  ABNL VS/O2: Afebrile, BP soft  MOBILITY: Total care, T&Rq2hr up with lift.   PAIN MANAGMENT: Resting comfortable in bed  DIET: NPO, start TF tomorrow 02/19 @ 7AM  BOWEL/BLADDER: Incontinent of B&B. BM x1   ABNL LAB/BG: WBC 17.1  DRAIN/DEVICES: PIV R arm LR @ 100 ml/hr  TELEMETRY RHYTHM: NA  SKIN: Blanchable redness to coccyx has had pre-existing wound   TESTS/PROCEDURES: Chest x-ray   D/C DAY/GOALS/PLACE: TBD  OTHER IMPORTANT INFO:

## 2020-02-19 NOTE — PROGRESS NOTES
[facility]    Hospitalist Progress Note    Assessment & Plan   Keyon Farias is a 59 year old male 58yo male with PMhx of of TBI nonverbal at baseline with chronic  R sided spastic hemiplegia, dysphagia with G-tube for TFs/meds, seizure disorder, panhypopituitarism and frequent hospital admissions for recurrent pneumonia. Well known to the hospitalist service. Was just admitted here from 2/9/20 -  2/12/20 for management of recurrent aspiration pneumonia. Discharged on course of Augmentin. Blood culture that stay grew gm+ bacilli resembling diphtheroids. He was readmitted on 2/19/2020 for presumed recurrent aspiration pneumonia after developing a fever.     Recurrent Aspiration Pneumonia  Clinical picture of sepsis dt recurrent aspiration pneumonia (febrile, tachycardic, coarse BS with WBC 17.1). CXR showed persisted bibasilar consolidations but no new infiltrate. Flu swab neg. Lactate 1.7. Procal 0.1 (low risk). Started on treatment with Zosyn and given dose of IV hydrocortisone given hx of panhypopituitarism.    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 Has feeding tube.  -- cont TFs  -- cont seizure meds per home regimen  -- cont other routine skin cares per routine given hx of pressure injuries and skin breakdown; wound RN following     Panhypopituitarism  Chronic and stable. Maintained on hydrocortisone, levothyroxine and testosterone.   Given one time dose of IV hydrocortisone on admission. No need for continued stress dosed steroids.  -- cont hydrocortisone and levothyroxine    FEN: discontinue IVFs, lytes stable, TFs per dietary recs  DVT Prophylaxis: PCDs  Code Status: Full Code    Disposition: Anticipate discharge home in 1-2d, pending continued clinical improvement.    Sun  Jossie Keita    Interval History   Seen this afternoon. Resting comfortably. Breathing nonlabored. No signs of discomfort.     -Data reviewed today: I reviewed all new labs and imaging results over the last 24 hours. I personally reviewed no images or EKG's today.    Physical Exam   Temp: 97.4  F (36.3  C) Temp src: Axillary BP: 105/60 Pulse: 89   Resp: 16 SpO2: 98 % O2 Device: None (Room air)    Vitals:    02/19/20 0638   Weight: 64.9 kg (143 lb 1.3 oz)     Vital Signs with Ranges  Temp:  [96.8  F (36  C)-97.6  F (36.4  C)] 97.4  F (36.3  C)  Pulse:  [60-89] 89  Resp:  [16] 16  BP: ()/(49-60) 105/60  SpO2:  [96 %-100 %] 98 %  I/O last 3 completed shifts:  In: 2769.67 [I.V.:6969.67; NG/GT:670]  Out: -     Constitutional: Resting comfortably, nonverbal, NAD  Respiratory: few coarse bibasilar BS, no wheeze, no increased work of breathing  Cardiovascular: HRRR, no MGR, no LE edema  GI: S, NT, ND, +BS  Skin/Integumen: warm/dry  Other:      Medications     dextrose       lactated ringers 100 mL/hr at 02/19/20 0442       sodium chloride 0.9%  1,000 mL Intravenous Once     acetylcysteine  2 mL Nebulization 4x Daily     aspirin  81 mg Oral or Feeding Tube Daily     Brivaracetam  100 mg Oral or Feeding Tube BID     carBAMazepine  150 mg Oral or Feeding Tube Q6H     ferrous sulfate  220 mg Per Feeding Tube Daily     fiber modular (NUTRISOURCE FIBER)  1 packet Per G Tube Daily     hydrocortisone  10 mg Oral Q24H     hydrocortisone  20 mg Oral Daily     ipratropium - albuterol 0.5 mg/2.5 mg/3 mL  3 mL Nebulization Q4H While awake     levothyroxine  150 mcg Oral QAM     metoclopramide  5 mg Per Feeding Tube BID     pantoprazole  40 mg Per J Tube Daily     piperacillin-tazobactam  3.375 g Intravenous Q6H     potassium & sodium phosphates  1 packet Oral TID     sodium bicarbonate  650 mg Oral Daily     sodium chloride (PF)  3 mL Intracatheter Q8H     sodium chloride (PF)  3 mL Intracatheter Q8H     testosterone cypionate   76 mg Intramuscular See Admin Instructions     vitamin C  1,000 mg Oral Daily     vitamin D3  2,000 Units Oral Daily       Data   Recent Labs   Lab 02/19/20  0857 02/18/20  0009   WBC 6.5 17.1*   HGB 12.1* 12.9*   MCV 85 84    230    133   POTASSIUM 3.4 3.9   CHLORIDE 111* 101   CO2 25 25   BUN 12 18   CR 0.78 0.73   ANIONGAP 5 7   STEVE 8.6 8.2*   * 88   ALBUMIN  --  2.9*   PROTTOTAL  --  7.5   BILITOTAL  --  0.3   ALKPHOS  --  95   ALT  --  41   AST  --  23       No results found for this or any previous visit (from the past 24 hour(s)).

## 2020-02-20 LAB
GLUCOSE BLDC GLUCOMTR-MCNC: 153 MG/DL (ref 70–99)
PHOSPHATE SERPL-MCNC: 2.9 MG/DL (ref 2.5–4.5)

## 2020-02-20 PROCEDURE — 27210429 ZZH NUTRITION PRODUCT INTERMEDIATE LITER

## 2020-02-20 PROCEDURE — 36415 COLL VENOUS BLD VENIPUNCTURE: CPT | Performed by: INTERNAL MEDICINE

## 2020-02-20 PROCEDURE — 00000146 ZZHCL STATISTIC GLUCOSE BY METER IP

## 2020-02-20 PROCEDURE — 94640 AIRWAY INHALATION TREATMENT: CPT | Mod: 76

## 2020-02-20 PROCEDURE — 94640 AIRWAY INHALATION TREATMENT: CPT

## 2020-02-20 PROCEDURE — 99232 SBSQ HOSP IP/OBS MODERATE 35: CPT | Performed by: INTERNAL MEDICINE

## 2020-02-20 PROCEDURE — 12000000 ZZH R&B MED SURG/OB

## 2020-02-20 PROCEDURE — 25000132 ZZH RX MED GY IP 250 OP 250 PS 637: Mod: GY | Performed by: INTERNAL MEDICINE

## 2020-02-20 PROCEDURE — 25000125 ZZHC RX 250: Performed by: INTERNAL MEDICINE

## 2020-02-20 PROCEDURE — 25000128 H RX IP 250 OP 636: Performed by: INTERNAL MEDICINE

## 2020-02-20 PROCEDURE — 40000275 ZZH STATISTIC RCP TIME EA 10 MIN

## 2020-02-20 PROCEDURE — 84100 ASSAY OF PHOSPHORUS: CPT | Performed by: INTERNAL MEDICINE

## 2020-02-20 RX ORDER — CHOLECALCIFEROL (VITAMIN D3) 50 MCG
2000 TABLET ORAL DAILY
Status: DISCONTINUED | OUTPATIENT
Start: 2020-02-20 | End: 2020-02-21 | Stop reason: HOSPADM

## 2020-02-20 RX ORDER — HYDROCORTISONE 10 MG/1
20 TABLET ORAL DAILY
Status: DISCONTINUED | OUTPATIENT
Start: 2020-02-20 | End: 2020-02-21 | Stop reason: HOSPADM

## 2020-02-20 RX ORDER — GUAR GUM
1 PACKET (EA) ORAL DAILY
Status: DISCONTINUED | OUTPATIENT
Start: 2020-02-20 | End: 2020-02-21 | Stop reason: HOSPADM

## 2020-02-20 RX ORDER — LEVOTHYROXINE SODIUM 150 UG/1
150 TABLET ORAL EVERY MORNING
Status: DISCONTINUED | OUTPATIENT
Start: 2020-02-20 | End: 2020-02-21 | Stop reason: HOSPADM

## 2020-02-20 RX ORDER — HYDROCORTISONE 10 MG/1
20 TABLET ORAL DAILY
Status: DISCONTINUED | OUTPATIENT
Start: 2020-02-20 | End: 2020-02-20

## 2020-02-20 RX ORDER — ASCORBIC ACID 500 MG
1000 TABLET ORAL DAILY
Status: DISCONTINUED | OUTPATIENT
Start: 2020-02-20 | End: 2020-02-21 | Stop reason: HOSPADM

## 2020-02-20 RX ORDER — METOCLOPRAMIDE HYDROCHLORIDE 5 MG/5ML
5 SOLUTION ORAL 2 TIMES DAILY
Status: DISCONTINUED | OUTPATIENT
Start: 2020-02-20 | End: 2020-02-21 | Stop reason: HOSPADM

## 2020-02-20 RX ORDER — HYDROCORTISONE 10 MG/1
10 TABLET ORAL EVERY 24 HOURS
Status: DISCONTINUED | OUTPATIENT
Start: 2020-02-20 | End: 2020-02-21 | Stop reason: HOSPADM

## 2020-02-20 RX ORDER — LEVOTHYROXINE SODIUM 150 UG/1
150 TABLET ORAL EVERY MORNING
Status: DISCONTINUED | OUTPATIENT
Start: 2020-02-20 | End: 2020-02-20

## 2020-02-20 RX ORDER — HYDROCORTISONE 10 MG/1
10 TABLET ORAL EVERY 24 HOURS
Status: DISCONTINUED | OUTPATIENT
Start: 2020-02-20 | End: 2020-02-20

## 2020-02-20 RX ORDER — SODIUM BICARBONATE 650 MG/1
650 TABLET ORAL DAILY
Status: DISCONTINUED | OUTPATIENT
Start: 2020-02-20 | End: 2020-02-21 | Stop reason: HOSPADM

## 2020-02-20 RX ADMIN — BRIVARACETAM 100 MG: 10 SOLUTION ORAL at 21:16

## 2020-02-20 RX ADMIN — POTASSIUM & SODIUM PHOSPHATES POWDER PACK 280-160-250 MG 1 PACKET: 280-160-250 PACK at 21:00

## 2020-02-20 RX ADMIN — Medication 40 MG: at 09:19

## 2020-02-20 RX ADMIN — PIPERACILLIN AND TAZOBACTAM 3.38 G: 3; .375 INJECTION, POWDER, FOR SOLUTION INTRAVENOUS at 11:13

## 2020-02-20 RX ADMIN — METOCLOPRAMIDE 5 MG: 5 SOLUTION ORAL at 09:19

## 2020-02-20 RX ADMIN — HYDROCORTISONE 10 MG: 10 TABLET ORAL at 14:46

## 2020-02-20 RX ADMIN — CHOLECALCIFEROL TAB 50 MCG (2000 UNIT) 2000 UNITS: 50 TAB at 09:18

## 2020-02-20 RX ADMIN — CARBAMAZEPINE 150 MG: 100 SUSPENSION ORAL at 09:18

## 2020-02-20 RX ADMIN — Medication 1 PACKET: at 09:39

## 2020-02-20 RX ADMIN — HYDROCORTISONE 20 MG: 10 TABLET ORAL at 09:18

## 2020-02-20 RX ADMIN — CARBAMAZEPINE 150 MG: 100 SUSPENSION ORAL at 13:29

## 2020-02-20 RX ADMIN — POTASSIUM & SODIUM PHOSPHATES POWDER PACK 280-160-250 MG 1 PACKET: 280-160-250 PACK at 16:36

## 2020-02-20 RX ADMIN — IPRATROPIUM BROMIDE AND ALBUTEROL SULFATE 3 ML: .5; 3 SOLUTION RESPIRATORY (INHALATION) at 08:34

## 2020-02-20 RX ADMIN — IPRATROPIUM BROMIDE AND ALBUTEROL SULFATE 3 ML: .5; 3 SOLUTION RESPIRATORY (INHALATION) at 23:13

## 2020-02-20 RX ADMIN — METOCLOPRAMIDE 5 MG: 5 SOLUTION ORAL at 20:57

## 2020-02-20 RX ADMIN — ACETYLCYSTEINE 4 ML: 200 SOLUTION ORAL; RESPIRATORY (INHALATION) at 12:02

## 2020-02-20 RX ADMIN — BRIVARACETAM 100 MG: 10 SOLUTION ORAL at 10:10

## 2020-02-20 RX ADMIN — MINERAL SUPPLEMENT IRON 300 MG / 5 ML STRENGTH LIQUID 100 PER BOX UNFLAVORED 220 MG: at 09:19

## 2020-02-20 RX ADMIN — IPRATROPIUM BROMIDE AND ALBUTEROL SULFATE 3 ML: .5; 3 SOLUTION RESPIRATORY (INHALATION) at 16:19

## 2020-02-20 RX ADMIN — SODIUM BICARBONATE 650 MG TABLET 650 MG: at 09:19

## 2020-02-20 RX ADMIN — ACETYLCYSTEINE 2 ML: 200 SOLUTION ORAL; RESPIRATORY (INHALATION) at 19:58

## 2020-02-20 RX ADMIN — IPRATROPIUM BROMIDE AND ALBUTEROL SULFATE 3 ML: .5; 3 SOLUTION RESPIRATORY (INHALATION) at 12:05

## 2020-02-20 RX ADMIN — ACETYLCYSTEINE 4 ML: 200 SOLUTION ORAL; RESPIRATORY (INHALATION) at 16:15

## 2020-02-20 RX ADMIN — PIPERACILLIN AND TAZOBACTAM 3.38 G: 3; .375 INJECTION, POWDER, FOR SOLUTION INTRAVENOUS at 22:57

## 2020-02-20 RX ADMIN — ASPIRIN 81 MG 81 MG: 81 TABLET ORAL at 09:18

## 2020-02-20 RX ADMIN — PIPERACILLIN AND TAZOBACTAM 3.38 G: 3; .375 INJECTION, POWDER, FOR SOLUTION INTRAVENOUS at 05:05

## 2020-02-20 RX ADMIN — LEVOTHYROXINE SODIUM 150 MCG: 150 TABLET ORAL at 09:19

## 2020-02-20 RX ADMIN — PIPERACILLIN AND TAZOBACTAM 3.38 G: 3; .375 INJECTION, POWDER, FOR SOLUTION INTRAVENOUS at 16:36

## 2020-02-20 RX ADMIN — CARBAMAZEPINE 150 MG: 100 SUSPENSION ORAL at 20:54

## 2020-02-20 RX ADMIN — OXYCODONE HYDROCHLORIDE AND ACETAMINOPHEN 1000 MG: 500 TABLET ORAL at 09:18

## 2020-02-20 RX ADMIN — POTASSIUM & SODIUM PHOSPHATES POWDER PACK 280-160-250 MG 1 PACKET: 280-160-250 PACK at 09:19

## 2020-02-20 RX ADMIN — CARBAMAZEPINE 150 MG: 100 SUSPENSION ORAL at 02:08

## 2020-02-20 RX ADMIN — ACETYLCYSTEINE 2 ML: 200 SOLUTION ORAL; RESPIRATORY (INHALATION) at 08:36

## 2020-02-20 RX ADMIN — IPRATROPIUM BROMIDE AND ALBUTEROL SULFATE 3 ML: .5; 3 SOLUTION RESPIRATORY (INHALATION) at 19:57

## 2020-02-20 ASSESSMENT — ACTIVITIES OF DAILY LIVING (ADL)
ADLS_ACUITY_SCORE: 41
ADLS_ACUITY_SCORE: 41
BATHING: 2-->ASSISTIVE PERSON
ADLS_ACUITY_SCORE: 41
TOILETING: 4-->COMPLETELY DEPENDENT
FALL_HISTORY_WITHIN_LAST_SIX_MONTHS: NO
AMBULATION: 4-->COMPLETELY DEPENDENT
RETIRED_COMMUNICATION: 3-->UNABLE TO SPEAK (NOT RELATED TO LANGUAGE BARRIER)
RETIRED_EATING: 4-->COMPLETELY DEPENDENT
ADLS_ACUITY_SCORE: 39
COGNITION: 2 - DIFFICULTY WITH ORGANIZING THOUGHTS
TRANSFERRING: 3-->ASSISTIVE EQUIPMENT AND PERSON
ADLS_ACUITY_SCORE: 39
ADLS_ACUITY_SCORE: 41
SWALLOWING: 2-->DIFFICULTY SWALLOWING LIQUIDS/FOODS
DRESS: 2-->ASSISTIVE PERSON

## 2020-02-20 NOTE — PROGRESS NOTES
[facility]    Hospitalist Progress Note    Assessment & Plan   Keyon Farias is a 59 year old male 60yo male with PMhx of of TBI nonverbal at baseline with chronic  R sided spastic hemiplegia, dysphagia with G-tube for TFs/meds, seizure disorder, panhypopituitarism and frequent hospital admissions for recurrent pneumonia. Well known to the hospitalist service. Was just admitted here from 2/9/20 -  2/12/20 for management of recurrent aspiration pneumonia. Discharged on course of Augmentin. Blood culture that stay grew gm+ bacilli resembling diphtheroids. He was readmitted on 2/19/2020 for presumed recurrent aspiration pneumonia after developing a fever.     Recurrent Aspiration Pneumonia  Clinical picture of sepsis dt recurrent aspiration pneumonia (febrile, tachycardic, coarse BS with WBC 17.1). CXR showed persisted bibasilar consolidations but no new infiltrate. Flu swab neg. Lactate 1.7. Procal 0.1 (low risk). Started on treatment with Zosyn and given dose of IV hydrocortisone given hx of panhypopituitarism.    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 Has feeding tube.  -- cont TFs  -- cont seizure meds per home regimen  -- cont other routine skin cares per routine given hx of pressure injuries and skin breakdown; wound RN following     Panhypopituitarism  Chronic and stable. Maintained on hydrocortisone, levothyroxine and testosterone.   Given one time dose of IV hydrocortisone on admission. No need for continued stress dosed steroids.  -- cont hydrocortisone and levothyroxine    FEN: discontinue IVFs, lytes stable, TFs per dietary recs  DVT Prophylaxis: PCDs  Code Status: Full Code    Disposition: Anticipate discharge home tomorrow.     Reji Connolly   Pager:  [phone]  Cell Phone:  [phone]       Interval History   No new complaints, comfortable on room air.      -Data reviewed today: I reviewed all new labs and imaging results over the last 24 hours. I personally reviewed no images or EKG's today.    Physical Exam   Temp: 98.4  F (36.9  C) Temp src: Oral BP: 109/56 Pulse: (!) 20 Heart Rate: 69 Resp: 20 SpO2: 95 % O2 Device: None (Room air)    Vitals:    02/19/20 0638 02/20/20 0517   Weight: 64.9 kg (143 lb 1.3 oz) 69.4 kg (153 lb)     Vital Signs with Ranges  Temp:  [97.4  F (36.3  C)-98.7  F (37.1  C)] 98.4  F (36.9  C)  Pulse:  [20] 20  Heart Rate:  [58-77] 69  Resp:  [18-20] 20  BP: (106-118)/(52-62) 109/56  SpO2:  [92 %-98 %] 95 %  I/O last 3 completed shifts:  In: 1160 [I.V.:100; NG/GT:435]  Out: -     Constitutional: Resting comfortably, nonverbal, NAD  Respiratory: clear   Cardiovascular: HRRR, no MGR, no LE edema  GI: S, NT, ND, +BS  Skin/Integumen: warm/dry  Neuro:  Non-verbal, nods appropriately     Medications     dextrose         sodium chloride 0.9%  1,000 mL Intravenous Once     acetylcysteine  2 mL Nebulization 4x Daily     aspirin  81 mg Oral or Feeding Tube Daily     Brivaracetam  100 mg Oral or Feeding Tube BID     carBAMazepine  150 mg Oral or Feeding Tube Q6H     ferrous sulfate  220 mg Per Feeding Tube Daily     fiber modular (NUTRISOURCE FIBER)  1 packet Per Feeding Tube Daily     hydrocortisone  10 mg Per J Tube Q24H     hydrocortisone  20 mg Per J Tube Daily     ipratropium - albuterol 0.5 mg/2.5 mg/3 mL  3 mL Nebulization Q4H While awake     levothyroxine  150 mcg Per J Tube QAM     metoclopramide  5 mg Per J Tube BID     pantoprazole  40 mg Per J Tube Daily     piperacillin-tazobactam  3.375 g Intravenous Q6H     potassium & sodium phosphates  1 packet Per J Tube TID     sodium bicarbonate  650 mg Per J Tube Daily     sodium chloride (PF)  3 mL Intracatheter Q8H     sodium chloride (PF)  3 mL Intracatheter Q8H     testosterone cypionate   76 mg Intramuscular See Admin Instructions     vitamin C  1,000 mg Per J Tube Daily     vitamin D3  2,000 Units Per J Tube Daily       Data   Recent Labs   Lab 02/19/20  0857 02/18/20  0009   WBC 6.5 17.1*   HGB 12.1* 12.9*   MCV 85 84    230    133   POTASSIUM 3.4 3.9   CHLORIDE 111* 101   CO2 25 25   BUN 12 18   CR 0.78 0.73   ANIONGAP 5 7   STEVE 8.6 8.2*   * 88   ALBUMIN  --  2.9*   PROTTOTAL  --  7.5   BILITOTAL  --  0.3   ALKPHOS  --  95   ALT  --  41   AST  --  23       No results found for this or any previous visit (from the past 24 hour(s)).

## 2020-02-20 NOTE — PLAN OF CARE
"DATE & TIME: 2/20/20 3995-2094   Cognitive Concerns/ Orientation : HECTOR full orientation, nonverbal but will communicate by shaking head \"yes/no\" and by moaning.   BEHAVIOR & AGGRESSION TOOL COLOR: Green  CIWA SCORE: NA  ABNL VS/O2: VSS on RA  MOBILITY: Up with a lift, A2; repo q2h in bed.   PAIN MANAGMENT: denies pain  DIET: NPO, TF (7a-7p)  BOWEL/BLADDER: incont on B&B  ABNL LAB/BG: phos 2.9  DRAIN/DEVICES: GJ tube patent and tube feeding running. PIV SL.   TELEMETRY RHYTHM: NA, radial pulse regular.   SKIN: coccyx ANANYA - blanchable redness, bruising.   TESTS/PROCEDURES: NA  D/C DAY/GOALS/PLACE: pending improvement, 1-2 days  OTHER IMPORTANT INFO: Mother called earlier and would like an update this afternoon/evening on POC  "

## 2020-02-20 NOTE — PLAN OF CARE
"Patient is alert and opens eyes to voice and name. HECTOR orientation R/T nonverbal. Ax2 lift. Turn and repo Q2H. Redness blanchable to coccyx. Shakes head \"no\" when asked about pain. NPO; TF @100ml from 9810-1002. Phos replaced this shift. Total cares, incont of B&B. Discharge pending further clinical improvement. Likely 1-2 days.   "

## 2020-02-20 NOTE — PROGRESS NOTES
Care Coordination:  Keyon's Mother Savannah updated on POC of ride home for pt set up with [facility] at 10:30AM.  Pt's mother is concerned the pt is being discharged too soon from the hospital as she always is.    Anali Tyson RN, Care Coordinator

## 2020-02-21 VITALS
BODY MASS INDEX: 21.05 KG/M2 | SYSTOLIC BLOOD PRESSURE: 105 MMHG | HEART RATE: 20 BPM | WEIGHT: 155.2 LBS | DIASTOLIC BLOOD PRESSURE: 59 MMHG | TEMPERATURE: 97.5 F | RESPIRATION RATE: 16 BRPM | OXYGEN SATURATION: 95 %

## 2020-02-21 LAB
ERYTHROCYTE [DISTWIDTH] IN BLOOD BY AUTOMATED COUNT: 15.7 % (ref 10–15)
HCT VFR BLD AUTO: 36.4 % (ref 40–53)
HGB BLD-MCNC: 11.9 G/DL (ref 13.3–17.7)
MCH RBC QN AUTO: 27.6 PG (ref 26.5–33)
MCHC RBC AUTO-ENTMCNC: 32.7 G/DL (ref 31.5–36.5)
MCV RBC AUTO: 85 FL (ref 78–100)
PLATELET # BLD AUTO: 214 10E9/L (ref 150–450)
POTASSIUM SERPL-SCNC: 3.6 MMOL/L (ref 3.4–5.3)
RBC # BLD AUTO: 4.31 10E12/L (ref 4.4–5.9)
WBC # BLD AUTO: 6.8 10E9/L (ref 4–11)

## 2020-02-21 PROCEDURE — 25000128 H RX IP 250 OP 636: Performed by: INTERNAL MEDICINE

## 2020-02-21 PROCEDURE — 36415 COLL VENOUS BLD VENIPUNCTURE: CPT | Performed by: INTERNAL MEDICINE

## 2020-02-21 PROCEDURE — 25000132 ZZH RX MED GY IP 250 OP 250 PS 637: Mod: GY | Performed by: INTERNAL MEDICINE

## 2020-02-21 PROCEDURE — 40000275 ZZH STATISTIC RCP TIME EA 10 MIN

## 2020-02-21 PROCEDURE — 94640 AIRWAY INHALATION TREATMENT: CPT

## 2020-02-21 PROCEDURE — 85027 COMPLETE CBC AUTOMATED: CPT | Performed by: INTERNAL MEDICINE

## 2020-02-21 PROCEDURE — 84132 ASSAY OF SERUM POTASSIUM: CPT | Performed by: INTERNAL MEDICINE

## 2020-02-21 PROCEDURE — 25000125 ZZHC RX 250: Performed by: INTERNAL MEDICINE

## 2020-02-21 PROCEDURE — 99238 HOSP IP/OBS DSCHRG MGMT 30/<: CPT | Performed by: INTERNAL MEDICINE

## 2020-02-21 RX ORDER — AMOXICILLIN AND CLAVULANATE POTASSIUM 400; 57 MG/5ML; MG/5ML
875 POWDER, FOR SUSPENSION ORAL 2 TIMES DAILY
Qty: 109 ML | Refills: 0 | Status: ON HOLD | OUTPATIENT
Start: 2020-02-21 | End: 2020-03-25

## 2020-02-21 RX ADMIN — ASPIRIN 81 MG 81 MG: 81 TABLET ORAL at 08:51

## 2020-02-21 RX ADMIN — SODIUM BICARBONATE 650 MG TABLET 650 MG: at 08:51

## 2020-02-21 RX ADMIN — Medication 40 MG: at 08:50

## 2020-02-21 RX ADMIN — MINERAL SUPPLEMENT IRON 300 MG / 5 ML STRENGTH LIQUID 100 PER BOX UNFLAVORED 220 MG: at 08:51

## 2020-02-21 RX ADMIN — Medication 1 PACKET: at 08:51

## 2020-02-21 RX ADMIN — OXYCODONE HYDROCHLORIDE AND ACETAMINOPHEN 1000 MG: 500 TABLET ORAL at 08:51

## 2020-02-21 RX ADMIN — ACETYLCYSTEINE 2 ML: 200 SOLUTION ORAL; RESPIRATORY (INHALATION) at 07:41

## 2020-02-21 RX ADMIN — BRIVARACETAM 100 MG: 10 SOLUTION ORAL at 08:50

## 2020-02-21 RX ADMIN — POTASSIUM & SODIUM PHOSPHATES POWDER PACK 280-160-250 MG 1 PACKET: 280-160-250 PACK at 08:52

## 2020-02-21 RX ADMIN — LEVOTHYROXINE SODIUM 150 MCG: 150 TABLET ORAL at 06:49

## 2020-02-21 RX ADMIN — CHOLECALCIFEROL TAB 50 MCG (2000 UNIT) 2000 UNITS: 50 TAB at 08:51

## 2020-02-21 RX ADMIN — CARBAMAZEPINE 150 MG: 100 SUSPENSION ORAL at 01:08

## 2020-02-21 RX ADMIN — CARBAMAZEPINE 150 MG: 100 SUSPENSION ORAL at 08:50

## 2020-02-21 RX ADMIN — METOCLOPRAMIDE 5 MG: 5 SOLUTION ORAL at 08:52

## 2020-02-21 RX ADMIN — IPRATROPIUM BROMIDE AND ALBUTEROL SULFATE 3 ML: .5; 3 SOLUTION RESPIRATORY (INHALATION) at 07:41

## 2020-02-21 RX ADMIN — HYDROCORTISONE 20 MG: 10 TABLET ORAL at 08:51

## 2020-02-21 RX ADMIN — PIPERACILLIN AND TAZOBACTAM 3.38 G: 3; .375 INJECTION, POWDER, FOR SOLUTION INTRAVENOUS at 05:13

## 2020-02-21 ASSESSMENT — ACTIVITIES OF DAILY LIVING (ADL)
ADLS_ACUITY_SCORE: 39

## 2020-02-21 NOTE — PLAN OF CARE
Discharge    Patient discharged to home via [facility]  Care plan note: HECTOR orientation, nonverbal. No s/s of pain. Up with lift. Incontinent of urine. IV removed. TF was running at goal 100cc/hr this AM. VSS on RA. Discharge AVS sent with patient so mother can have copy. Follow up appt made by care coordinator. Discharge medication sent with patient. Patients only belongings were a shirt-sent with patient.     Listed belongings gathered and returned to patient. Yes  Care Plan and Patient education resolved: Yes  Prescriptions if needed, hard copies sent with patient  Yes  Home and hospital acquired medications returned to patient: Yes  Medication Bin checked and emptied on discharge Yes  Follow up appointment made for patient: Yes

## 2020-02-21 NOTE — PLAN OF CARE
"Cognitive Concerns/ Orientation : HECTOR full orientation, nonverbal but will communicate by shaking head \"yes/no\" and by moaning.   BEHAVIOR & AGGRESSION TOOL COLOR: Green  CIWA SCORE: NA  ABNL VS/O2: VSS on RA  MOBILITY: Up with a lift, A2; repo q2h in bed.   PAIN MANAGMENT: denies pain  DIET: NPO, TF (7a-7p)  BOWEL/BLADDER: incont on B&B  ABNL LAB/BG: phos 2.9  DRAIN/DEVICES: GJ tube patent and tube feeding running. PIV SL.   TELEMETRY RHYTHM: NA, radial pulse regular.   SKIN: coccyx ANANYA - blanchable redness, bruising.   TESTS/PROCEDURES: NA  D/C DAY/GOALS/PLACE: Tomorrow 10.30 am  "

## 2020-02-21 NOTE — DISCHARGE SUMMARY
[facility]  Discharge Summary        Keyon Farias MRN# [id number]   YOB: 1960 Age: 59 year old     Date of Admission: 2/17/2020  Date of Discharge: 2/21/2020  Admitting Physician: Manish Motta MD  Discharge Physician: Luis Westfall MD     Primary Provider: Carlos Gomez  Primary Care Physician Phone Number: [phone]         Discharge Diagnoses:   Recurrent aspiration pneumonia or pneumonitis.        Other Chronic Medical Problems:      1. TBI with aphasia, dysphagia, and R sided spastic hemiplegia.   2. Dysphagia with GJ-tube for TFs.  3. Seizure disorder.  4. 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  5. GERD.       Allergies:         Allergies   Allergen Reactions     Dilantin [Phenytoin Sodium]      Valproic Acid      Toxicity c bone marrow suspension, elevated ammonia levels            Discharge Medications:        Current Discharge Medication List      START taking these medications    Details   amoxicillin-clavulanate 400-57 MG/5ML PO suspension 10.9 mLs (875 mg) by Per G Tube route 2 times daily for 5 days  Qty: 109 mL, Refills: 0    Associated Diagnoses: Aspiration pneumonia of both lower lobes due to gastric secretions (H)         CONTINUE these medications which have NOT CHANGED    Details   acetylcysteine (MUCOMYST) 20 % neb solution Take 2 mLs by nebulization 4 times daily With albuterol at 0700, 1100, 1500, and 1900       albuterol (PROVENTIL) (5 MG/ML) 0.5% neb solution Take 2.5 mg by nebulization every 4 hours (while awake) 0700 1100 1500 1900 with mucomyst       aspirin (ASA) 81 MG chewable tablet 81 mg by Oral or Feeding Tube route daily At 0900      bacitracin ointment Apply topically daily as needed for wound care To PEG site.       Bioflavonoid Products (VITAMIN C PLUS) 1000 MG TABS 1 tablet by Oral or FT or NG tube route      Brivaracetam (BRIVIACT) 10 MG/ML solution 100 mg by Oral or Feeding Tube route 2 times daily 0900, 2100      calcium carbonate  1250 (500 CA) MG/5ML SUSP suspension 5 mLs (1,250 mg) by Per J Tube route 3 times daily (with meals)  Qty: 450 mL    Associated Diagnoses: Malnutrition (H)      carBAMazepine (TEGRETOL) 100 MG/5ML suspension 150 mg by Oral or Feeding Tube route every 6 hours At 06:00, 12:00, 18:00 and 24:00 for seizures       ferrous sulfate 220 (44 Fe) MG/5ML ELIX 220 mg by Per Feeding Tube route daily      Guar Gum (FIBER MODULAR, NUTRISOURCE FIBER,) packet 1 packet by Per G Tube route daily  Qty: 30 packet, Refills: 0    Associated Diagnoses: Pneumonia of both lower lobes due to infectious organism (H)      !! hydrocortisone (CORTEF) 5 MG tablet 10 mg by Oral or FT or NG tube route daily (with dinner) At 1500      !! hydrocortisone (CORTEF) 5 MG tablet 20 mg by Oral or FT or NG tube route every morning       hydrocortisone 1 % CREA cream Place rectally 2 times daily as needed for other Apply to reddened memo areas as needed      levothyroxine (SYNTHROID/LEVOTHROID) 150 MCG tablet Take 150 mcg by mouth every morning      melatonin (MELATONIN) 1 MG/ML LIQD liquid 6 mg by Per NG tube route At Bedtime       metoclopramide (REGLAN) 5 MG/5ML solution 5 mg by Per Feeding Tube route 2 times daily      miconazole (MICATIN) 2 % AERP powder Apply topically 2 times daily as needed       mupirocin (BACTROBAN) 2 % external ointment Apply topically 2 times daily as needed       order for DME Equipment being ordered: Nebulizer  Qty: 1 Units, Refills: 0    Associated Diagnoses: Pneumonia of both lower lobes due to infectious organism (H)      pantoprazole (PROTONIX) 2 mg/mL SUSP suspension 20 mLs (40 mg) by Per J Tube route daily  Qty: 400 mL, Refills: 1    Associated Diagnoses: Gastroesophageal reflux disease, esophagitis presence not specified      potassium & sodium phosphates (NEUTRA-PHOS) 280-160-250 MG Packet Take 1 packet by mouth 3 times daily       Scopolamine HBr POWD Dispense #90. Mix contents with small amount of water for admin via  J-tube.  Administer 0.8 mg three times each day.  Qty: 90 Bottle, Refills: 11    Associated Diagnoses: Aspiration pneumonia (H)      Skin Protectants, Misc. (BALMEX SKIN PROTECTANT) OINT Externally apply topically 2 times daily as needed (irritation) Applay to reddened memo areas twice daily as needed      sodium bicarbonate 650 MG tablet Take 1 tablet (650 mg) by mouth daily  Qty: 30 tablet, Refills: 0    Associated Diagnoses: Hyponatremia      testosterone cypionate (DEPOTESTOTERONE CYPIONATE) 200 MG/ML injection Inject 76 mg into the muscle See Admin Instructions Every 2 weeks on Thursdays  76 mg or 0.38 mL      vitamin D3 (CHOLECALCIFEROL) 2000 units (50 mcg) tablet Take 2,000 Units by mouth daily Crush and feed via j-tube @@ 0900       !! - Potential duplicate medications found. Please discuss with provider.      STOP taking these medications       amoxicillin-clavulanate (AUGMENTIN) 875-125 MG tablet Comments:   Reason for Stopping:                   Discharge Instructions and Follow-Up:      Discharge Orders      Home Care PT Referral for Hospital Discharge      Home Care SLP Referral for Hospital Discharge      Follow-up and recommended labs and tests     Follow up with primary care provider, [facility] on Thursday 2/27 at 1:30PM.  Phone #[phone], for hospital follow- up.     Reason for your hospital stay    Aspiration pneumonia     Activity    Your activity upon discharge: activity as tolerated     Discharge Instructions    Call Dr. Loco if any medical questions at Cell Phone [phone].     Wound care and dressings    Instructions to care for your wound at home:   Plan of care for wound located R IT/ buttocks- effective now  -NO DRESSINGS TO COCCYX AREA, LEAVE OPEN TO THE AIR  - TID and PRN perineal cares with Rita Cleanse and Protect, dust with baby powder  - POSITION PT SIDE TO SIDE ONLY EVERY 2 HOURS, WHEN IN BED  - HEELS ELEVATED AT ALL TIMES WITH AT LEAST ONE PILLOW  UNDER EACH LEG, ASSURE HEELS FLOATING  - MAKE SURE TO USE AT LEAST 5-6 PILLOWS IN THE BED AT ALL TIMES TO ACHIEVE GOOD POSITIONING  - PT IS NOT CLEAN UNTIL YOU SEE THE BASE OF THE SKIN FOLD AND WIPES COME AWAY CLEAN- then dust with baby powder  NOTE- when up to the chair pt needs to be on a chair cushion, not pillows.  Pillows are NOT pressure relieving.  If pt is at risk or with known PIs they need to be on a chair cushion (#[id number]).  Do not sit for more than 2 hours at a time     Full Code     Diet    Follow this diet upon discharge:       Adult Formula Drip Feeding: Continuous Isosource 1.5; Jejunostomy; Goal Rate: 100; mL/hr; From: 7:00 AM; 7:00 PM; Medication - Feeding Tube Flush Frequency: At least 30 mL water before and after medication administration and with tube clogging, and water flush 150 ml at 0700, 1100, 1500, and 1900.       NPO for Medical/Clinical Reasons Except for: Meds           Consultations This Hospital Stay:      N/A.        Admission History:      Please see the H&P by Manish Motta MD on 2/17/2020 for complete details. Briefly, Keyon Farias is a 59 year old male with history including TBI with aphasia, R sided spastic hemiplegia and dysphagia with GJ-tube for TFs/meds; seizure disorder; panhypopituitarism; and frequent hospital admissions for recurrent pneumonia (most recently 2/9-2/12 with 20 hospitalizations  In 2019); who presented 2/17 with recurrent aspiration pneumonia.        Problem Oriented Hospital Course:      Recurrent aspiration pneumonia or pneumonitis.  * Multiple hospitalizations for aspiration pneumonia. Has g-tube.  * Presented 2/17 with coarse breath sounds, low-grade fever, tachycardia. On initial evaluation 2/18, temp 100, tachycardic, BP's OK, not hypoxic; WBC 17.1; CXR 2/17 showed no acute-appearing infiltrates or consolidation. Started on Zosyn on admit (was already on a course of Augmentin PTA which was held). Continued on nebs including mucomyst.  - Discharge  on Augmentin.  - Continue Duonebs, Mucomyst nebs.     TBI with aphasia, dysphagia, and R sided spastic hemiplegia.   Dysphagia with GJ-tube for TFs.  Patient's mother is his caregiver, along with home care attendants. Has frequent hospitalization for recurrent pneumonias.   - Continue TF's.  - Continue Reglan BID.     Seizure disorder.  Secondary to TBI. Chronic and stable on PTA AEDs.  -  Continue brivaracetam and carbamazepine.      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 Given a stress dose of IV hydrocortisone in the ED.  - Continue hydrocortisone.  - Continue levothyroxine.  - Continue testosterone.     GERD.  - Continue Protonix.        Pending Results:        Unresulted Labs Ordered in the Past 30 Days of this Admission     Date and Time Order Name Status Description    2/21/2020 0002 Potassium In process     2/17/2020 2158 Blood culture Preliminary     2/17/2020 2158 Blood culture Preliminary                 Discharge Disposition:      Discharged to home.        Discharge Time:      Less than 30 minutes.        Key Imaging Studies, Lab Findings and Procedures/Surgeries:        Results for orders placed or performed during the hospital encounter of 02/17/20   XR Chest 2 Views    Narrative    EXAM: XR CHEST 2 VW  LOCATION: [facility]  DATE/TIME: 2/17/2020 11:04 PM    INDICATION: Fever.    COMPARISON: 12/27/2019.      Impression    IMPRESSION: Normal heart size and pulmonary vascularity. No acute-appearing infiltrates or consolidation. The left costophrenic angle is not included on this study. Elevation right hemidiaphragm. No significant bony abnormalities. Chest is otherwise   negative.

## 2020-02-21 NOTE — PLAN OF CARE
"Cognitive Concerns/ Orientation : HECTOR full orientation, nonverbal but will communicate by shaking head \"yes/no\" and by moaning.   BEHAVIOR & AGGRESSION TOOL COLOR: Green  CIWA SCORE: NA  ABNL VS/O2: VSS ex. Bradycardic at times on RA  MOBILITY: Up with a lift, A2; repo q2h in bed.   PAIN MANAGMENT: denies pain. No nonverbal indicators of pain.  DIET: NPO, TF started at 7a runs 7a-7p  BOWEL/BLADDER: incont on B&B, no BM this shift  ABNL LAB/BG: phos 2.9  DRAIN/DEVICES: GJ tube patent and tube feed running  TELEMETRY RHYTHM: NA.  SKIN: coccyx ANANYA - blanchable redness, bruising.   TESTS/PROCEDURES: NA  D/C DAY/GOALS/PLACE: Today 10:30 am  "

## 2020-02-21 NOTE — PROGRESS NOTES
[facility]    Internal Medicine Hospitalist Progress Note  02/21/2020  I evaluated patient on the above date.    Luis Westfall Jr., MD  [phone] (p)  Text Page        Assessment & Plan New actions/orders today (02/21/2020) are underlined.    Keyon Farias is a 59 year old male with history including TBI with aphasia, R sided spastic hemiplegia and dysphagia with GJ-tube for TFs/meds; seizure disorder; panhypopituitarism; and frequent hospital admissions for recurrent pneumonia (most recently 2/9-2/12 with 20 hospitalizations  In 2019); who presented 2/17 with recurrent aspiration pneumonia.    Recurrent aspiration pneumonia or pneumonitis.  * Multiple hospitalizations for aspiration pneumonia. Has g-tube.  * Presented 2/17 with coarse breath sounds, low-grade fever, tachycardia. On initial evaluation 2/18, temp 100, tachycardic, BP's OK, not hypoxic; WBC 17.1; CXR 2/17 showed no acute-appearing infiltrates or consolidation. Started on Zosyn on admit (was already on a course of Augmentin PTA which was held). Continued on nebs including mucomyst.  - Continue Zosyn (started 2/18).  - Discharge on Augmentin.  - Continue Duonebs, Mucomyst nebs.    TBI with aphasia, dysphagia, and R sided spastic hemiplegia.   Dysphagia with GJ-tube for TFs.  Patient's mother is his caregiver, along with home care attendants. Has frequent hospitalization for recurrent pneumonias.   - Continue TF's.  - Continue Reglan BID.     Seizure disorder.  Secondary to TBI. Chronic and stable on PTA AEDs.  -  Continue brivaracetam and carbamazepine.      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 Given a stress dose of IV hydrocortisone in the ED.  - Continue hydrocortisone.  - Continue levothyroxine.  - Continue testosterone q2 wks.     GERD.  - Continue Protonix.     Diet: NPO for Medical/Clinical Reasons Except for: Meds  Adult Formula Drip Feeding: Continuous Isosource 1.5; Jejunostomy; Goal Rate: 100; mL/hr; From: 7:00 AM; 7:00 PM;  Medication - Feeding Tube Flush Frequency: At least 15-30 mL water before and after medication administration and with tube clogging;...  Diet    Prophylaxis: PCD's, ambulation.   Lerma Catheter: not present  Code Status: Full Code      Disposition Plan   Expected discharge: Today, recommended to prior living arrangement.  Entered: Luis Westfall MD 02/21/2020, 8:40 AM         Interval History   No acute events.  Doing better overall.    -Data reviewed today: I reviewed all new labs and imaging over the last 24 hours. I personally reviewed no images or EKG's today.    Physical Exam   Heart Rate: 82, Blood pressure 105/59, pulse (!) 20, temperature 97.5  F (36.4  C), temperature source Oral, resp. rate 16, weight 70.4 kg (155 lb 3.3 oz), SpO2 95 %.  Vitals:    02/19/20 0638 02/20/20 0517 02/21/20 0654   Weight: 64.9 kg (143 lb 1.3 oz) 69.4 kg (153 lb) 70.4 kg (155 lb 3.3 oz)     Vital Signs with Ranges  Temp:  [97.4  F (36.3  C)-99  F (37.2  C)] 97.5  F (36.4  C)  Pulse:  [20] 20  Heart Rate:  [55-82] 82  Resp:  [16-18] 16  BP: (105-125)/(59-69) 105/59  SpO2:  [94 %-97 %] 95 %  Patient Vitals for the past 24 hrs:   BP Temp Temp src Pulse Heart Rate Resp SpO2 Weight   02/21/20 0729 105/59 97.5  F (36.4  C) Oral -- 82 16 95 % --   02/21/20 0654 -- -- -- -- -- -- -- 70.4 kg (155 lb 3.3 oz)   02/20/20 2300 121/66 97.4  F (36.3  C) Oral -- 55 18 97 % --   02/20/20 2051 123/69 99  F (37.2  C) Axillary -- 57 18 94 % --   02/20/20 1600 125/66 98.5  F (36.9  C) Oral -- 75 18 97 % --   02/20/20 1225 -- 98.4  F (36.9  C) Oral (!) 20 69 -- 95 % --     I/O's Last 24 hours  I/O last 3 completed shifts:  In: 1580 [I.V.:100; NG/GT:855]  Out: -     Constitutional: Awake, alert, follows some commands.  Respiratory: Diminished in bases. No crackles or wheezes.  Cardiovascular: RRR, no m/r/g.  GI: Soft, nt, nd, +BS.  Skin/Integumen:   Other:        Data   Recent Labs   Lab 02/19/20  0857 02/18/20  0009   WBC 6.5 17.1*   HGB 12.1*  12.9*   MCV 85 84    230    133   POTASSIUM 3.4 3.9   CHLORIDE 111* 101   CO2 25 25   BUN 12 18   CR 0.78 0.73   ANIONGAP 5 7   STEVE 8.6 8.2*   * 88   ALBUMIN  --  2.9*   PROTTOTAL  --  7.5   BILITOTAL  --  0.3   ALKPHOS  --  95   ALT  --  41   AST  --  23     Recent Labs   Lab Test 02/20/20  0902 02/19/20  0857 02/18/20  0009 02/12/20  0812 02/10/20  0742 02/09/20  2100  12/24/19  1849  12/19/19  0851 12/18/19  1628 12/18/19  1316   GLC  --  157* 88 84 94 117*   < >  --    < >  --   --   --    *  --   --   --   --   --   --  100*  --  157* 174* 200*    < > = values in this interval not displayed.         No results found for this or any previous visit (from the past 24 hour(s)).    Medications   All medications were reviewed.    dextrose         sodium chloride 0.9%  1,000 mL Intravenous Once     acetylcysteine  2 mL Nebulization 4x Daily     aspirin  81 mg Oral or Feeding Tube Daily     Brivaracetam  100 mg Oral or Feeding Tube BID     carBAMazepine  150 mg Oral or Feeding Tube Q6H     ferrous sulfate  220 mg Per Feeding Tube Daily     fiber modular (NUTRISOURCE FIBER)  1 packet Per Feeding Tube Daily     hydrocortisone  10 mg Per J Tube Q24H     hydrocortisone  20 mg Per J Tube Daily     ipratropium - albuterol 0.5 mg/2.5 mg/3 mL  3 mL Nebulization Q4H While awake     levothyroxine  150 mcg Per J Tube QAM     metoclopramide  5 mg Per J Tube BID     pantoprazole  40 mg Per J Tube Daily     piperacillin-tazobactam  3.375 g Intravenous Q6H     potassium & sodium phosphates  1 packet Per J Tube TID     sodium bicarbonate  650 mg Per J Tube Daily     sodium chloride (PF)  3 mL Intracatheter Q8H     sodium chloride (PF)  3 mL Intracatheter Q8H     testosterone cypionate  76 mg Intramuscular See Admin Instructions     vitamin C  1,000 mg Per J Tube Daily     vitamin D3  2,000 Units Per J Tube Daily

## 2020-02-24 LAB
BACTERIA SPEC CULT: NO GROWTH
BACTERIA SPEC CULT: NO GROWTH
SPECIMEN SOURCE: NORMAL
SPECIMEN SOURCE: NORMAL

## 2020-03-23 ENCOUNTER — APPOINTMENT (OUTPATIENT)
Dept: GENERAL RADIOLOGY | Facility: CLINIC | Age: 60
DRG: 871 | End: 2020-03-23
Attending: EMERGENCY MEDICINE
Payer: MEDICARE

## 2020-03-23 ENCOUNTER — HOSPITAL ENCOUNTER (INPATIENT)
Facility: CLINIC | Age: 60
LOS: 3 days | Discharge: HOME HEALTH | DRG: 871 | End: 2020-03-26
Attending: EMERGENCY MEDICINE | Admitting: HOSPITALIST
Payer: MEDICARE

## 2020-03-23 DIAGNOSIS — J69.0 ASPIRATION PNEUMONIA, UNSPECIFIED ASPIRATION PNEUMONIA TYPE, UNSPECIFIED LATERALITY, UNSPECIFIED PART OF LUNG (H): Primary | ICD-10-CM

## 2020-03-23 DIAGNOSIS — J69.0 ASPIRATION PNEUMONIA OF BOTH LUNGS DUE TO GASTRIC SECRETIONS, UNSPECIFIED PART OF LUNG (H): ICD-10-CM

## 2020-03-23 LAB
ALBUMIN SERPL-MCNC: 3.1 G/DL (ref 3.4–5)
ALBUMIN UR-MCNC: 10 MG/DL
ALP SERPL-CCNC: 107 U/L (ref 40–150)
ALT SERPL W P-5'-P-CCNC: 30 U/L (ref 0–70)
ANION GAP SERPL CALCULATED.3IONS-SCNC: 6 MMOL/L (ref 3–14)
APPEARANCE UR: CLEAR
AST SERPL W P-5'-P-CCNC: 29 U/L (ref 0–45)
BASOPHILS # BLD AUTO: 0.1 10E9/L (ref 0–0.2)
BASOPHILS NFR BLD AUTO: 0.3 %
BILIRUB SERPL-MCNC: 0.3 MG/DL (ref 0.2–1.3)
BILIRUB UR QL STRIP: NEGATIVE
BUN SERPL-MCNC: 18 MG/DL (ref 7–30)
CALCIUM SERPL-MCNC: 8.4 MG/DL (ref 8.5–10.1)
CHLORIDE SERPL-SCNC: 99 MMOL/L (ref 94–109)
CO2 SERPL-SCNC: 28 MMOL/L (ref 20–32)
COLOR UR AUTO: YELLOW
CREAT SERPL-MCNC: 0.77 MG/DL (ref 0.66–1.25)
DIFFERENTIAL METHOD BLD: ABNORMAL
EOSINOPHIL # BLD AUTO: 0.4 10E9/L (ref 0–0.7)
EOSINOPHIL NFR BLD AUTO: 2.6 %
ERYTHROCYTE [DISTWIDTH] IN BLOOD BY AUTOMATED COUNT: 14.7 % (ref 10–15)
FLUAV+FLUBV AG SPEC QL: NEGATIVE
FLUAV+FLUBV AG SPEC QL: NEGATIVE
GFR SERPL CREATININE-BSD FRML MDRD: >90 ML/MIN/{1.73_M2}
GLUCOSE SERPL-MCNC: 105 MG/DL (ref 70–99)
GLUCOSE UR STRIP-MCNC: 30 MG/DL
HCT VFR BLD AUTO: 39.8 % (ref 40–53)
HGB BLD-MCNC: 13.4 G/DL (ref 13.3–17.7)
HGB UR QL STRIP: NEGATIVE
IMM GRANULOCYTES # BLD: 0.1 10E9/L (ref 0–0.4)
IMM GRANULOCYTES NFR BLD: 0.3 %
KETONES UR STRIP-MCNC: NEGATIVE MG/DL
LACTATE BLD-SCNC: 2.1 MMOL/L (ref 0.7–2)
LACTATE BLD-SCNC: 2.9 MMOL/L (ref 0.7–2)
LEUKOCYTE ESTERASE UR QL STRIP: NEGATIVE
LYMPHOCYTES # BLD AUTO: 2.4 10E9/L (ref 0.8–5.3)
LYMPHOCYTES NFR BLD AUTO: 14.6 %
MCH RBC QN AUTO: 29.3 PG (ref 26.5–33)
MCHC RBC AUTO-ENTMCNC: 33.7 G/DL (ref 31.5–36.5)
MCV RBC AUTO: 87 FL (ref 78–100)
MONOCYTES # BLD AUTO: 1.3 10E9/L (ref 0–1.3)
MONOCYTES NFR BLD AUTO: 7.7 %
NEUTROPHILS # BLD AUTO: 12.1 10E9/L (ref 1.6–8.3)
NEUTROPHILS NFR BLD AUTO: 74.5 %
NITRATE UR QL: NEGATIVE
NRBC # BLD AUTO: 0 10*3/UL
NRBC BLD AUTO-RTO: 0 /100
PH UR STRIP: 8.5 PH (ref 5–7)
PLATELET # BLD AUTO: 198 10E9/L (ref 150–450)
POTASSIUM SERPL-SCNC: 4.3 MMOL/L (ref 3.4–5.3)
PROCALCITONIN SERPL-MCNC: <0.05 NG/ML
PROT SERPL-MCNC: 7.8 G/DL (ref 6.8–8.8)
RBC # BLD AUTO: 4.58 10E12/L (ref 4.4–5.9)
RBC #/AREA URNS AUTO: 1 /HPF (ref 0–2)
SODIUM SERPL-SCNC: 133 MMOL/L (ref 133–144)
SOURCE: ABNORMAL
SP GR UR STRIP: 1.02 (ref 1–1.03)
SPECIMEN SOURCE: NORMAL
UROBILINOGEN UR STRIP-MCNC: NORMAL MG/DL (ref 0–2)
WBC # BLD AUTO: 16.2 10E9/L (ref 4–11)
WBC #/AREA URNS AUTO: <1 /HPF (ref 0–5)

## 2020-03-23 PROCEDURE — 25800030 ZZH RX IP 258 OP 636: Performed by: EMERGENCY MEDICINE

## 2020-03-23 PROCEDURE — 84145 PROCALCITONIN (PCT): CPT | Performed by: EMERGENCY MEDICINE

## 2020-03-23 PROCEDURE — 87040 BLOOD CULTURE FOR BACTERIA: CPT | Performed by: EMERGENCY MEDICINE

## 2020-03-23 PROCEDURE — 25000132 ZZH RX MED GY IP 250 OP 250 PS 637: Mod: GY | Performed by: EMERGENCY MEDICINE

## 2020-03-23 PROCEDURE — 80053 COMPREHEN METABOLIC PANEL: CPT | Performed by: EMERGENCY MEDICINE

## 2020-03-23 PROCEDURE — 87804 INFLUENZA ASSAY W/OPTIC: CPT | Performed by: EMERGENCY MEDICINE

## 2020-03-23 PROCEDURE — 96368 THER/DIAG CONCURRENT INF: CPT

## 2020-03-23 PROCEDURE — 81001 URINALYSIS AUTO W/SCOPE: CPT | Performed by: EMERGENCY MEDICINE

## 2020-03-23 PROCEDURE — U0001 2019-NCOV DIAGNOSTIC P: HCPCS | Performed by: EMERGENCY MEDICINE

## 2020-03-23 PROCEDURE — 12000000 ZZH R&B MED SURG/OB

## 2020-03-23 PROCEDURE — 96361 HYDRATE IV INFUSION ADD-ON: CPT

## 2020-03-23 PROCEDURE — 99285 EMERGENCY DEPT VISIT HI MDM: CPT | Mod: 25

## 2020-03-23 PROCEDURE — 85025 COMPLETE CBC W/AUTO DIFF WBC: CPT | Performed by: EMERGENCY MEDICINE

## 2020-03-23 PROCEDURE — 83605 ASSAY OF LACTIC ACID: CPT | Performed by: EMERGENCY MEDICINE

## 2020-03-23 PROCEDURE — 96365 THER/PROPH/DIAG IV INF INIT: CPT

## 2020-03-23 PROCEDURE — 99223 1ST HOSP IP/OBS HIGH 75: CPT | Mod: AI | Performed by: HOSPITALIST

## 2020-03-23 PROCEDURE — 25000128 H RX IP 250 OP 636: Performed by: EMERGENCY MEDICINE

## 2020-03-23 PROCEDURE — 71045 X-RAY EXAM CHEST 1 VIEW: CPT

## 2020-03-23 RX ORDER — PIPERACILLIN SODIUM, TAZOBACTAM SODIUM 3; .375 G/15ML; G/15ML
3.38 INJECTION, POWDER, LYOPHILIZED, FOR SOLUTION INTRAVENOUS ONCE
Status: COMPLETED | OUTPATIENT
Start: 2020-03-23 | End: 2020-03-23

## 2020-03-23 RX ADMIN — SODIUM CHLORIDE 1000 ML: 9 INJECTION, SOLUTION INTRAVENOUS at 23:32

## 2020-03-23 RX ADMIN — HYDROCORTISONE SODIUM SUCCINATE 25 MG: 100 INJECTION, POWDER, FOR SOLUTION INTRAMUSCULAR; INTRAVENOUS at 23:34

## 2020-03-23 RX ADMIN — PIPERACILLIN SODIUM AND TAZOBACTAM SODIUM 3.38 G: 3; .375 INJECTION, POWDER, LYOPHILIZED, FOR SOLUTION INTRAVENOUS at 23:29

## 2020-03-23 RX ADMIN — ACETAMINOPHEN 1000 MG: 325 SUSPENSION ORAL at 22:02

## 2020-03-23 RX ADMIN — SODIUM CHLORIDE 1000 ML: 9 INJECTION, SOLUTION INTRAVENOUS at 22:09

## 2020-03-23 ASSESSMENT — ENCOUNTER SYMPTOMS: FEVER: 1

## 2020-03-24 LAB
ANION GAP SERPL CALCULATED.3IONS-SCNC: 2 MMOL/L (ref 3–14)
BASOPHILS # BLD AUTO: 0.1 10E9/L (ref 0–0.2)
BASOPHILS NFR BLD AUTO: 0.6 %
BUN SERPL-MCNC: 15 MG/DL (ref 7–30)
CALCIUM SERPL-MCNC: 8.1 MG/DL (ref 8.5–10.1)
CHLORIDE SERPL-SCNC: 110 MMOL/L (ref 94–109)
CO2 SERPL-SCNC: 25 MMOL/L (ref 20–32)
CREAT SERPL-MCNC: 0.84 MG/DL (ref 0.66–1.25)
DIFFERENTIAL METHOD BLD: ABNORMAL
EOSINOPHIL # BLD AUTO: 0.4 10E9/L (ref 0–0.7)
EOSINOPHIL NFR BLD AUTO: 3.1 %
ERYTHROCYTE [DISTWIDTH] IN BLOOD BY AUTOMATED COUNT: 15.1 % (ref 10–15)
GFR SERPL CREATININE-BSD FRML MDRD: >90 ML/MIN/{1.73_M2}
GLUCOSE BLDC GLUCOMTR-MCNC: 144 MG/DL (ref 70–99)
GLUCOSE SERPL-MCNC: 100 MG/DL (ref 70–99)
HCT VFR BLD AUTO: 38 % (ref 40–53)
HGB BLD-MCNC: 12 G/DL (ref 13.3–17.7)
IMM GRANULOCYTES # BLD: 0 10E9/L (ref 0–0.4)
IMM GRANULOCYTES NFR BLD: 0.1 %
LACTATE BLD-SCNC: 2.2 MMOL/L (ref 0.7–2)
LYMPHOCYTES # BLD AUTO: 3.1 10E9/L (ref 0.8–5.3)
LYMPHOCYTES NFR BLD AUTO: 26.3 %
MCH RBC QN AUTO: 27.9 PG (ref 26.5–33)
MCHC RBC AUTO-ENTMCNC: 31.6 G/DL (ref 31.5–36.5)
MCV RBC AUTO: 88 FL (ref 78–100)
MONOCYTES # BLD AUTO: 0.9 10E9/L (ref 0–1.3)
MONOCYTES NFR BLD AUTO: 8 %
NEUTROPHILS # BLD AUTO: 7.3 10E9/L (ref 1.6–8.3)
NEUTROPHILS NFR BLD AUTO: 61.9 %
NRBC # BLD AUTO: 0 10*3/UL
NRBC BLD AUTO-RTO: 0 /100
PHOSPHATE SERPL-MCNC: 2.5 MG/DL (ref 2.5–4.5)
PLATELET # BLD AUTO: 188 10E9/L (ref 150–450)
POTASSIUM SERPL-SCNC: 4.3 MMOL/L (ref 3.4–5.3)
RBC # BLD AUTO: 4.3 10E12/L (ref 4.4–5.9)
SODIUM SERPL-SCNC: 137 MMOL/L (ref 133–144)
WBC # BLD AUTO: 11.8 10E9/L (ref 4–11)

## 2020-03-24 PROCEDURE — 84100 ASSAY OF PHOSPHORUS: CPT | Performed by: HOSPITALIST

## 2020-03-24 PROCEDURE — 99232 SBSQ HOSP IP/OBS MODERATE 35: CPT | Performed by: INTERNAL MEDICINE

## 2020-03-24 PROCEDURE — 25000125 ZZHC RX 250: Performed by: HOSPITALIST

## 2020-03-24 PROCEDURE — 94640 AIRWAY INHALATION TREATMENT: CPT

## 2020-03-24 PROCEDURE — 25000125 ZZHC RX 250: Performed by: INTERNAL MEDICINE

## 2020-03-24 PROCEDURE — 25800030 ZZH RX IP 258 OP 636: Performed by: HOSPITALIST

## 2020-03-24 PROCEDURE — 83605 ASSAY OF LACTIC ACID: CPT | Performed by: INTERNAL MEDICINE

## 2020-03-24 PROCEDURE — 36415 COLL VENOUS BLD VENIPUNCTURE: CPT | Performed by: INTERNAL MEDICINE

## 2020-03-24 PROCEDURE — 00000146 ZZHCL STATISTIC GLUCOSE BY METER IP

## 2020-03-24 PROCEDURE — 40000275 ZZH STATISTIC RCP TIME EA 10 MIN

## 2020-03-24 PROCEDURE — 80048 BASIC METABOLIC PNL TOTAL CA: CPT | Performed by: HOSPITALIST

## 2020-03-24 PROCEDURE — 25000132 ZZH RX MED GY IP 250 OP 250 PS 637: Mod: GY | Performed by: HOSPITALIST

## 2020-03-24 PROCEDURE — 25000128 H RX IP 250 OP 636: Performed by: HOSPITALIST

## 2020-03-24 PROCEDURE — 25000132 ZZH RX MED GY IP 250 OP 250 PS 637: Mod: GY | Performed by: INTERNAL MEDICINE

## 2020-03-24 PROCEDURE — 36415 COLL VENOUS BLD VENIPUNCTURE: CPT | Performed by: HOSPITALIST

## 2020-03-24 PROCEDURE — 85025 COMPLETE CBC W/AUTO DIFF WBC: CPT | Performed by: HOSPITALIST

## 2020-03-24 PROCEDURE — 12000000 ZZH R&B MED SURG/OB

## 2020-03-24 PROCEDURE — 94640 AIRWAY INHALATION TREATMENT: CPT | Mod: 76

## 2020-03-24 RX ORDER — ONDANSETRON 4 MG/1
4 TABLET, ORALLY DISINTEGRATING ORAL EVERY 6 HOURS PRN
Status: DISCONTINUED | OUTPATIENT
Start: 2020-03-24 | End: 2020-03-26 | Stop reason: HOSPADM

## 2020-03-24 RX ORDER — ONDANSETRON 2 MG/ML
4 INJECTION INTRAMUSCULAR; INTRAVENOUS EVERY 6 HOURS PRN
Status: DISCONTINUED | OUTPATIENT
Start: 2020-03-24 | End: 2020-03-26 | Stop reason: HOSPADM

## 2020-03-24 RX ORDER — ASPIRIN 81 MG/1
81 TABLET, CHEWABLE ORAL DAILY
Status: DISCONTINUED | OUTPATIENT
Start: 2020-03-24 | End: 2020-03-26 | Stop reason: HOSPADM

## 2020-03-24 RX ORDER — LEVOTHYROXINE SODIUM 75 UG/1
150 TABLET ORAL EVERY MORNING
Status: DISCONTINUED | OUTPATIENT
Start: 2020-03-24 | End: 2020-03-26 | Stop reason: HOSPADM

## 2020-03-24 RX ORDER — SCOPOLAMINE HYDROBROMIDE
0.8 POWDER (GRAM) MISCELLANEOUS 3 TIMES DAILY
Status: DISCONTINUED | OUTPATIENT
Start: 2020-03-24 | End: 2020-03-26 | Stop reason: HOSPADM

## 2020-03-24 RX ORDER — HYDROCORTISONE 20 MG/1
20 TABLET ORAL EVERY MORNING
Status: DISCONTINUED | OUTPATIENT
Start: 2020-03-24 | End: 2020-03-26 | Stop reason: HOSPADM

## 2020-03-24 RX ORDER — ACETAMINOPHEN 325 MG/1
650 TABLET ORAL EVERY 4 HOURS PRN
Status: DISCONTINUED | OUTPATIENT
Start: 2020-03-24 | End: 2020-03-26 | Stop reason: HOSPADM

## 2020-03-24 RX ORDER — SODIUM BICARBONATE 650 MG/1
650 TABLET ORAL DAILY
Status: DISCONTINUED | OUTPATIENT
Start: 2020-03-24 | End: 2020-03-26 | Stop reason: HOSPADM

## 2020-03-24 RX ORDER — PROCHLORPERAZINE 25 MG
25 SUPPOSITORY, RECTAL RECTAL EVERY 12 HOURS PRN
Status: DISCONTINUED | OUTPATIENT
Start: 2020-03-24 | End: 2020-03-26 | Stop reason: HOSPADM

## 2020-03-24 RX ORDER — IPRATROPIUM BROMIDE AND ALBUTEROL SULFATE 2.5; .5 MG/3ML; MG/3ML
3 SOLUTION RESPIRATORY (INHALATION) EVERY 4 HOURS PRN
Status: DISCONTINUED | OUTPATIENT
Start: 2020-03-24 | End: 2020-03-26 | Stop reason: HOSPADM

## 2020-03-24 RX ORDER — GUAR GUM
1 PACKET (EA) ORAL DAILY
Status: DISCONTINUED | OUTPATIENT
Start: 2020-03-24 | End: 2020-03-26 | Stop reason: HOSPADM

## 2020-03-24 RX ORDER — ALBUTEROL SULFATE 5 MG/ML
2.5 SOLUTION RESPIRATORY (INHALATION)
Status: DISCONTINUED | OUTPATIENT
Start: 2020-03-24 | End: 2020-03-26 | Stop reason: HOSPADM

## 2020-03-24 RX ORDER — PIPERACILLIN SODIUM, TAZOBACTAM SODIUM 4; .5 G/20ML; G/20ML
4.5 INJECTION, POWDER, LYOPHILIZED, FOR SOLUTION INTRAVENOUS EVERY 6 HOURS
Status: DISCONTINUED | OUTPATIENT
Start: 2020-03-24 | End: 2020-03-26 | Stop reason: HOSPADM

## 2020-03-24 RX ORDER — CARBAMAZEPINE 100 MG/5ML
150 SUSPENSION ORAL EVERY 6 HOURS
Status: DISCONTINUED | OUTPATIENT
Start: 2020-03-24 | End: 2020-03-26 | Stop reason: HOSPADM

## 2020-03-24 RX ORDER — DEXTROSE MONOHYDRATE 100 MG/ML
INJECTION, SOLUTION INTRAVENOUS CONTINUOUS PRN
Status: DISCONTINUED | OUTPATIENT
Start: 2020-03-24 | End: 2020-03-26 | Stop reason: HOSPADM

## 2020-03-24 RX ORDER — PROCHLORPERAZINE MALEATE 10 MG
10 TABLET ORAL EVERY 6 HOURS PRN
Status: DISCONTINUED | OUTPATIENT
Start: 2020-03-24 | End: 2020-03-26 | Stop reason: HOSPADM

## 2020-03-24 RX ORDER — CHOLECALCIFEROL (VITAMIN D3) 50 MCG
2000 TABLET ORAL DAILY
Status: DISCONTINUED | OUTPATIENT
Start: 2020-03-24 | End: 2020-03-26 | Stop reason: HOSPADM

## 2020-03-24 RX ORDER — NALOXONE HYDROCHLORIDE 0.4 MG/ML
.1-.4 INJECTION, SOLUTION INTRAMUSCULAR; INTRAVENOUS; SUBCUTANEOUS
Status: DISCONTINUED | OUTPATIENT
Start: 2020-03-24 | End: 2020-03-26 | Stop reason: HOSPADM

## 2020-03-24 RX ORDER — LIDOCAINE 40 MG/G
CREAM TOPICAL
Status: DISCONTINUED | OUTPATIENT
Start: 2020-03-24 | End: 2020-03-26 | Stop reason: HOSPADM

## 2020-03-24 RX ORDER — SODIUM CHLORIDE 9 MG/ML
INJECTION, SOLUTION INTRAVENOUS CONTINUOUS
Status: DISCONTINUED | OUTPATIENT
Start: 2020-03-24 | End: 2020-03-25

## 2020-03-24 RX ORDER — HYDROCORTISONE 10 MG/1
10 TABLET ORAL
Status: DISCONTINUED | OUTPATIENT
Start: 2020-03-24 | End: 2020-03-26 | Stop reason: HOSPADM

## 2020-03-24 RX ORDER — METOCLOPRAMIDE HYDROCHLORIDE 5 MG/5ML
5 SOLUTION ORAL 2 TIMES DAILY
Status: DISCONTINUED | OUTPATIENT
Start: 2020-03-24 | End: 2020-03-26 | Stop reason: HOSPADM

## 2020-03-24 RX ORDER — ACETYLCYSTEINE 200 MG/ML
2 SOLUTION ORAL; RESPIRATORY (INHALATION) 4 TIMES DAILY
Status: DISCONTINUED | OUTPATIENT
Start: 2020-03-24 | End: 2020-03-26 | Stop reason: HOSPADM

## 2020-03-24 RX ADMIN — Medication 1 PACKET: at 12:54

## 2020-03-24 RX ADMIN — BRIVARACETAM 100 MG: 10 SOLUTION ORAL at 10:45

## 2020-03-24 RX ADMIN — Medication 0.8 MG: at 21:03

## 2020-03-24 RX ADMIN — PIPERACILLIN AND TAZOBACTAM 4.5 G: 4; .5 INJECTION, POWDER, FOR SOLUTION INTRAVENOUS at 16:07

## 2020-03-24 RX ADMIN — SODIUM BICARBONATE 650 MG TABLET 650 MG: at 10:45

## 2020-03-24 RX ADMIN — IPRATROPIUM BROMIDE AND ALBUTEROL SULFATE 3 ML: .5; 3 SOLUTION RESPIRATORY (INHALATION) at 18:58

## 2020-03-24 RX ADMIN — Medication 40 MG: at 10:46

## 2020-03-24 RX ADMIN — BRIVARACETAM 100 MG: 10 SOLUTION ORAL at 21:03

## 2020-03-24 RX ADMIN — MINERAL SUPPLEMENT IRON 300 MG / 5 ML STRENGTH LIQUID 100 PER BOX UNFLAVORED 220 MG: at 12:54

## 2020-03-24 RX ADMIN — CHOLECALCIFEROL TAB 50 MCG (2000 UNIT) 2000 UNITS: 50 TAB at 10:45

## 2020-03-24 RX ADMIN — METOCLOPRAMIDE 5 MG: 5 SOLUTION ORAL at 21:03

## 2020-03-24 RX ADMIN — IPRATROPIUM BROMIDE AND ALBUTEROL SULFATE 3 ML: .5; 3 SOLUTION RESPIRATORY (INHALATION) at 15:23

## 2020-03-24 RX ADMIN — ACETYLCYSTEINE 2 ML: 200 SOLUTION ORAL; RESPIRATORY (INHALATION) at 18:59

## 2020-03-24 RX ADMIN — ACETYLCYSTEINE 4 ML: 200 SOLUTION ORAL; RESPIRATORY (INHALATION) at 15:22

## 2020-03-24 RX ADMIN — ASPIRIN 81 MG 81 MG: 81 TABLET ORAL at 10:46

## 2020-03-24 RX ADMIN — CARBAMAZEPINE 150 MG: 100 SUSPENSION ORAL at 12:55

## 2020-03-24 RX ADMIN — HYDROCORTISONE 10 MG: 10 TABLET ORAL at 16:07

## 2020-03-24 RX ADMIN — POTASSIUM & SODIUM PHOSPHATES POWDER PACK 280-160-250 MG 1 PACKET: 280-160-250 PACK at 21:03

## 2020-03-24 RX ADMIN — ENOXAPARIN SODIUM 40 MG: 40 INJECTION SUBCUTANEOUS at 01:07

## 2020-03-24 RX ADMIN — IPRATROPIUM BROMIDE AND ALBUTEROL SULFATE 3 ML: .5; 3 SOLUTION RESPIRATORY (INHALATION) at 10:05

## 2020-03-24 RX ADMIN — POTASSIUM & SODIUM PHOSPHATES POWDER PACK 280-160-250 MG 1 PACKET: 280-160-250 PACK at 16:07

## 2020-03-24 RX ADMIN — HYDROCORTISONE 20 MG: 20 TABLET ORAL at 12:55

## 2020-03-24 RX ADMIN — CARBAMAZEPINE 150 MG: 100 SUSPENSION ORAL at 18:12

## 2020-03-24 RX ADMIN — METOCLOPRAMIDE 5 MG: 5 SOLUTION ORAL at 12:53

## 2020-03-24 RX ADMIN — SODIUM CHLORIDE: 9 INJECTION, SOLUTION INTRAVENOUS at 01:07

## 2020-03-24 RX ADMIN — LEVOTHYROXINE SODIUM 150 MCG: 75 TABLET ORAL at 10:46

## 2020-03-24 RX ADMIN — SODIUM CHLORIDE: 9 INJECTION, SOLUTION INTRAVENOUS at 13:26

## 2020-03-24 RX ADMIN — POTASSIUM & SODIUM PHOSPHATES POWDER PACK 280-160-250 MG 1 PACKET: 280-160-250 PACK at 12:54

## 2020-03-24 RX ADMIN — Medication 0.8 MG: at 18:12

## 2020-03-24 RX ADMIN — PIPERACILLIN AND TAZOBACTAM 4.5 G: 4; .5 INJECTION, POWDER, FOR SOLUTION INTRAVENOUS at 10:47

## 2020-03-24 RX ADMIN — PIPERACILLIN AND TAZOBACTAM 4.5 G: 4; .5 INJECTION, POWDER, FOR SOLUTION INTRAVENOUS at 05:24

## 2020-03-24 ASSESSMENT — ACTIVITIES OF DAILY LIVING (ADL)
RETIRED_EATING: 4-->COMPLETELY DEPENDENT
FALL_HISTORY_WITHIN_LAST_SIX_MONTHS: NO
ADLS_ACUITY_SCORE: 37
DRESS: 4-->COMPLETELY DEPENDENT
TRANSFERRING: 4-->COMPLETELY DEPENDENT
COGNITION: 1 - ATTENTION OR MEMORY DEFICITS
TOILETING: 4-->COMPLETELY DEPENDENT
BATHING: 4-->COMPLETELY DEPENDENT
ADLS_ACUITY_SCORE: 45
ADLS_ACUITY_SCORE: 37
ADLS_ACUITY_SCORE: 37
AMBULATION: 4-->COMPLETELY DEPENDENT
RETIRED_COMMUNICATION: 2-->DIFFICULTY SPEAKING (NOT RELATED TO LANGUAGE BARRIER)
SWALLOWING: 2-->DIFFICULTY SWALLOWING LIQUIDS/FOODS
ADLS_ACUITY_SCORE: 37

## 2020-03-24 NOTE — ED NOTES
[facility]  ED Nurse Handoff Report    ED Chief complaint: Fatigue and Fever      ED Diagnosis:   Final diagnoses:   None       Code Status: Full Code, Admitting MD to confirm.    Allergies:   Allergies   Allergen Reactions     Dilantin [Phenytoin Sodium]      Valproic Acid      Toxicity c bone marrow suspension, elevated ammonia levels        Patient Story: Comes from home with a fever of 102.3.  Focused Assessment:  20 G IV in L wrist. JPEG per baseline. Patient alert and oriented to situation. Requires total cares per baseline. Nonverbal, nods and moves L hand to communicate    Treatments and/or interventions provided: 1 L of NS, tylenol, 25 mg prednisone, 1 liter NS, 3.375 g piperacillin-tazobactam.    Patient's response to treatments and/or interventions: Tolerated well    To be done/followed up on inpatient unit:  Continue with plan of care per admitting MD.    Does this patient have any cognitive concerns?: Nonverbal, HECTOR    Activity level - Baseline/Home:  Total Care  Activity Level - Current:   Total Care    Patient's Preferred language: English   Needed?: No    Isolation: Contact   Infection: ESBL  MRSA  Bariatric?: No    Vital Signs:   Vitals:    03/23/20 2100 03/23/20 2147   BP: 117/74    Temp: 99.3  F (37.4  C) 102.3  F (39.1  C)   TempSrc: Oral Rectal   SpO2: 95%        Cardiac Rhythm:     Was the PSS-3 completed:   Yes   What interventions are required if any?               Family Comments: Patient has a guardian per chart.  OBS brochure/video discussed/provided to patient/family: N/A              For the majority of the shift this patient's behavior was Green.     ED NURSE PHONE NUMBER: [phone]

## 2020-03-24 NOTE — ED NOTES
Bed: ED26  Expected date:   Expected time:   Means of arrival:   Comments:  E1  61M General malaise/fever  2046

## 2020-03-24 NOTE — PROVIDER NOTIFICATION
MD Notification    Notified Person: MD    Notified Person Name: Sun Keita     Notification Date/Time: 3/24/2020 0925    Notification Interaction: Spoke with MD    Purpose of Notification: LA 2.2    Orders Received: Continue current IVF at 100    Comments:

## 2020-03-24 NOTE — PROGRESS NOTES
RECEIVING UNIT ED HANDOFF REVIEW    ED Nurse Handoff Report was reviewed by: Luis Alfredo Rivera RN on March 24, 2020 at 12:04 AM

## 2020-03-24 NOTE — H&P
[facility]  History and Physical   Hospitalist  Helio Galan MD       Keyon Farias MRN# [id number]   YOB: 1960 Age: 59 year old      Date of Admission:  3/23/2020         Assessment and Plan:   Keyon Farias is a 59 year old male with PMH significant for traumatic brain injury with resultant aphasia and nonverbal status, spastic paraplegia, seizure, panhypopituitarism, GERD, and recurrent admissions for aspiration pneumonia, who was brought to the ER with fever.      # Recurrent aspiration pneumonia versus pneumonitis with sepsis.    - admit as inpatient.  He has had frequent recurrence despite tube feeding and aspiration precautions.  However, he recovers quickly from febrile event following aspiration potentially suggesting pneumonitis rather than pneumonia, has been evaluated by ID on several occasions.    - temp of 102, lactate 2.9, wbc 16.2 and CXR noted with worsened bibasilar infiltrates  - influenza negative; also being ruled out for COVID; will maintain respiratory isolation  - continue with Zosyn initiated in the ER.  Monitor fever curve.  We will have aspiration precautions, n.p.o., including medications, feeding through the G-tube.  Nutrition consult for tube feeding.   - Resume PTA Mucomyst and Albuterol nebs.  We will also start him on normal saline at 100 mL per hour as he clinically looks dehydrated      # Traumatic brain injury with sequelae of aphasia and spastic paralysis.    - He gets home care services.  We will consult  for disposition planning.     # Seizure disorder.    - Resume PTA seizure meds when verified by pharmacy      #  Panhypopituitarism.    - Resume PTA hydrocortisone 20 mg in morning and 10 mg at night when verified by pharmacy , does look septic, but hemodynamically stable.  We will order 1 dose of Solu-Medrol 100 mg IV.     #.  Hypothyroidism.  Continue PTA Synthroid.     #  Deep venous thrombosis prophylaxis with Lovenox  #  Code status.  This has been discussed on multiple occasions with his mom and he will be:  FULL CODE.          Primary Care Physician:   Carlos Gomez [phone]         Chief Complaint:   Fever    History is limited from the patient because of his TBI and non-verbal status; history reviewed from charts and ED reports; patient well known to me from prior visits with similar presentations.         History of Present Illness:     Mr. Farias is a 59-year-old male with a past medical history as listed below, notably for traumatic brain injury with subsequent panhypopituitarism and spastic hemiplegia, seizures who presents with fever and cough consistent with recurrent aspiration pneumonia.  He has multiple prior hospitalizations with similar presentations, last discharged on 2/21 on PO Augmentin. Despite aspiration precautions including G-tube feeding, bed elevation, and no oral intake including no oral medications, he continues to have recurrence of aspiration pneumonitis versus aspiration pneumonia. Tonight he was noted with a temperature of 102 degree Fahrenheit and with concern for infection he was sent to ER, was seen by Dr. Christine.  A chest x-ray was noted with worsening bibasilar infiltrates. He was also noted with lactate 2.9 and wbc 16.2;  He was given IV fluids, Zosyn, 25 mg IV Hydrocortisone and hospitalist was requested admission for further evaluation.  The patient himself is unable to voice any concerns, does respond with thumbs up or thumbs down.                Past Medical History:     1.  Traumatic brain injury resulting in aphasia and spastic hemiplegia.   2.  Recurrent aspiration pneumonia versus pneumonitis.   3.  History of DVT upper extremity.   4.  Gastroesophageal reflux disease.   5.  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   6.  Seizure.   7.  Spastic hemiplegia.   8.  History of septic shock.   9.  Hypothyroidism.   10.  History of necrotizing pancreatitis.          Past  Surgical History:     Past Surgical History:   Procedure Laterality Date     ENDOSCOPIC ULTRASOUND UPPER GASTROINTESTINAL TRACT (GI) N/A 1/30/2017    Procedure: ENDOSCOPIC ULTRASOUND, ESOPHAGOSCOPY / UPPER GASTROINTESTINAL TRACT (GI);  Surgeon: Jus Montana MD;  Location: [facility] OR     ENDOSCOPIC ULTRASOUND, ESOPHAGOSCOPY, GASTROSCOPY, DUODENOSCOPY (EGD), NECROSECTOMY N/A 2/7/2017    Procedure: ENDOSCOPIC ULTRASOUND, ESOPHAGOSCOPY, GASTROSCOPY, DUODENOSCOPY (EGD), NECROSECTOMY;  Surgeon: Jack Marcus MD;  Location:  OR     ESOPHAGOSCOPY, GASTROSCOPY, DUODENOSCOPY (EGD), COMBINED  3/13/2014    Procedure: COMBINED ESOPHAGOSCOPY, GASTROSCOPY, DUODENOSCOPY (EGD), BIOPSY SINGLE OR MULTIPLE;  gastroscopy;  Surgeon: Digna Rhodes MD;  Location:  GI     ESOPHAGOSCOPY, GASTROSCOPY, DUODENOSCOPY (EGD), COMBINED N/A 12/6/2016    Procedure: COMBINED ESOPHAGOSCOPY, GASTROSCOPY, DUODENOSCOPY (EGD);  Surgeon: Digna Rhodes MD;  Location: [facility]     ESOPHAGOSCOPY, GASTROSCOPY, DUODENOSCOPY (EGD), COMBINED N/A 2/7/2017    Procedure: COMBINED ENDOSCOPIC ULTRASOUND, ESOPHAGOSCOPY, GASTROSCOPY, DUODENOSCOPY (EGD), FINE NEEDLE ASPIRATE/BIOPSY;  Surgeon: Too Thakur MD;  Location:  OR     HEAD & NECK SURGERY      reconstructive facial surgery following accident in 1989     IR FOLLOW UP VISIT INPATIENT  2/20/2019     IR GASTRO JEJUNOSTOMY TUBE CHANGE  12/20/2018     IR GASTRO JEJUNOSTOMY TUBE CHANGE  2/4/2019     IR GASTRO JEJUNOSTOMY TUBE CHANGE  3/8/2019     IR GASTRO JEJUNOSTOMY TUBE CHANGE  8/7/2019     IR GASTRO JEJUNOSTOMY TUBE CHANGE  1/13/2020     IR GASTRO JEJUNOSTOMY TUBE CHANGE  1/30/2020     IR PICC EXCHANGE LEFT  8/15/2019     LAPAROSCOPIC APPENDECTOMY  7/30/2013    Procedure: LAPAROSCOPIC APPENDECTOMY;  LAPAROSCOPIC APPENDECTOMY;  Surgeon: Manish Pierce MD;  Location:  OR     LAPAROSCOPIC ASSISTED INSERTION TUBE GASTROTOMY N/A 9/7/2016    Procedure: LAPAROSCOPIC  ASSISTED INSERTION TUBE GASTROSTOMY;  Surgeon: Manish Pierce MD;  Location:  OR     ORTHOPEDIC SURGERY      right hand repair     TRACHEOSTOMY N/A 9/3/2016    Procedure: TRACHEOSTOMY;  Surgeon: João Ortiz MD;  Location:  OR     TRACHEOSTOMY N/A 2016    Procedure: TRACHEOSTOMY;  Surgeon: João Ortiz MD;  Location:  OR     VASCULAR SURGERY                Home Medications:     Prior to Admission Medications   Prescriptions Last Dose Informant Patient Reported? Taking?   Bioflavonoid Products (VITAMIN C PLUS) 1000 MG TABS  Mother Yes No   Si tablet by Oral or FT or NG tube route   Brivaracetam (BRIVIACT) 10 MG/ML solution  Mother Yes No   Si mg by Oral or Feeding Tube route 2 times daily 0900, 2100   Guar Gum (FIBER MODULAR, NUTRISOURCE FIBER,) packet  Mother No No   Si packet by Per G Tube route daily   Scopolamine HBr POWD  Mother No No   Sig: Dispense #90. Mix contents with small amount of water for admin via J-tube.  Administer 0.8 mg three times each day.   Skin Protectants, Misc. (BALMEX SKIN PROTECTANT) OINT  Mother Yes No   Sig: Externally apply topically 2 times daily as needed (irritation) Applay to reddened memo areas twice daily as needed   acetylcysteine (MUCOMYST) 20 % neb solution  Mother Yes No   Sig: Take 2 mLs by nebulization 4 times daily With albuterol at 0700, 1100, 1500, and 1900    albuterol (PROVENTIL) (5 MG/ML) 0.5% neb solution  Mother Yes No   Sig: Take 2.5 mg by nebulization every 4 hours (while awake) 0700 1100 1500 1900 with mucomyst    amoxicillin-clavulanate 400-57 MG/5ML PO suspension   No No   Sig: 10.9 mLs (875 mg) by Per G Tube route 2 times daily for 5 days   aspirin (ASA) 81 MG chewable tablet  Mother Yes No   Si mg by Oral or Feeding Tube route daily At 0900   bacitracin ointment  Mother Yes No   Sig: Apply topically daily as needed for wound care To PEG site.    calcium carbonate 1250 (500 CA) MG/5ML SUSP suspension   Mother No No   Si mLs (1,250 mg) by Per J Tube route 3 times daily (with meals)   carBAMazepine (TEGRETOL) 100 MG/5ML suspension  Mother Yes No   Si mg by Oral or Feeding Tube route every 6 hours At 06:00, 12:00, 18:00 and 24:00 for seizures    ferrous sulfate 220 (44 Fe) MG/5ML ELIX  Mother Yes No   Si mg by Per Feeding Tube route daily   hydrocortisone (CORTEF) 5 MG tablet  Mother Yes No   Sig: 10 mg by Oral or FT or NG tube route daily (with dinner) At 1500   hydrocortisone (CORTEF) 5 MG tablet  Mother Yes No   Si mg by Oral or FT or NG tube route every morning    hydrocortisone 1 % CREA cream  Mother Yes No   Sig: Place rectally 2 times daily as needed for other Apply to reddened memo areas as needed   levothyroxine (SYNTHROID/LEVOTHROID) 150 MCG tablet  Mother Yes No   Sig: Take 150 mcg by mouth every morning   melatonin (MELATONIN) 1 MG/ML LIQD liquid  Mother Yes No   Si mg by Per NG tube route At Bedtime    metoclopramide (REGLAN) 5 MG/5ML solution  Mother Yes No   Si mg by Per Feeding Tube route 2 times daily   miconazole (MICATIN) 2 % AERP powder  Mother Yes No   Sig: Apply topically 2 times daily as needed    mupirocin (BACTROBAN) 2 % external ointment  Mother Yes No   Sig: Apply topically 2 times daily as needed    order for DME  Mother No No   Sig: Equipment being ordered: Nebulizer   pantoprazole (PROTONIX) 2 mg/mL SUSP suspension  Mother No No   Si mLs (40 mg) by Per J Tube route daily   potassium & sodium phosphates (NEUTRA-PHOS) 280-160-250 MG Packet  Mother Yes No   Sig: Take 1 packet by mouth 3 times daily    sodium bicarbonate 650 MG tablet  Mother No No   Sig: Take 1 tablet (650 mg) by mouth daily   testosterone cypionate (DEPOTESTOTERONE CYPIONATE) 200 MG/ML injection  Mother Yes No   Sig: Inject 76 mg into the muscle See Admin Instructions Every 2 weeks on   76 mg or 0.38 mL   vitamin D3 (CHOLECALCIFEROL) 2000 units (50 mcg) tablet  Mother Yes No    Sig: Take 2,000 Units by mouth daily Crush and feed via j-tube @@ 0900      Facility-Administered Medications: None            Allergies:     Allergies   Allergen Reactions     Dilantin [Phenytoin Sodium]      Valproic Acid      Toxicity c bone marrow suspension, elevated ammonia levels             Social History:   Keyon Farias  reports that he quit smoking about 30 years ago. He has never used smokeless tobacco. He reports that he does not drink alcohol or use drugs.              Family History:   Keyon Farias family history includes Cancer in his father.    Family history was reviewed by myself and not pertinent to current presentation.           Review of Systems:   A10 point Review of Systems was done and were negative other than noted in the HPI.             Physical Exam:   Blood pressure 117/57, pulse 109, temperature 100.2  F (37.9  C), temperature source Rectal, resp. rate 26, SpO2 95 %.  0 lbs 0 oz        GENERAL:  The patient is conscious, alert, awake, mostly nonverbal but follows simple commands and interacts minimally with yes, no, of nodding, and he uses thumbs up for affirmative answers. ; has a sick looking appearance  HEENT:  Pupils are equal and reactive to light and accommodation.  Extraocular movements are intact.  Oral mucosa is very  dry.   NECK:  Supple, no raised JVD.   RESPIRATORY:  He has mild coarse breath sounds bilaterally, no significant wheezing.   CARDIOVASCULAR:  Normal S1-S2, regular rhythm, tachycardic, no murmur.   ABDOMEN:  Soft, nontender, nondistended.  A feeding tube is in place.  No guarding, rigidity or rebound tenderness.  Bowel sounds are present.   LOWER EXTREMITIES:  With no edema.   NEUROLOGIC:  He has spastic hemiplegia on the right and is mostly nonverbal.   PSYCHIATRY:  Normal mood.              Data:   All new lab and imaging data was reviewed in Epic.   Significant labs and imagings include:    BMP noted with lactate 2.9  negative Pro calcitonin  CBC with WBC  16.2  -UA unremarkable  blood culture pending  influenza negative; COVID pending  chest x-ray reviewed noted with worsening by basilar infiltrate               Helio Galan MD  Hospitalist

## 2020-03-24 NOTE — PHARMACY-ADMISSION MEDICATION HISTORY
Pharmacy Medication History  Admission medication history interview status for the 3/23/2020  admission is complete. See EPIC admission navigator for prior to admission medications     Medication history sources: Patient's mother (guardian)  Medication history source reliability: Good  Adherence assessment: Good    Significant changes made to the medication list:  None      Additional medication history information:   Patient's mother will bring in home supply of scopolamine powder for use while in hospital.     Medication reconciliation completed by provider prior to medication history? No    Time spent in this activity: 20 minutes       Prior to Admission medications    Medication Sig Last Dose Taking? Auth Provider   miconazole (MICATIN) 2 % AERP powder Apply topically 2 times daily as needed  Past Week at Unknown time Yes Unknown, Entered By History   mupirocin (BACTROBAN) 2 % external ointment Apply topically 2 times daily as needed  Past Week at Unknown time Yes Reported, Patient   Skin Protectants, Misc. (BALMEX SKIN PROTECTANT) OINT Externally apply topically 2 times daily as needed (irritation) Applay to reddened memo areas twice daily as needed Past Week at Unknown time Yes Unknown, Entered By History   testosterone cypionate (DEPOTESTOTERONE CYPIONATE) 200 MG/ML injection Inject 76 mg into the muscle See Admin Instructions Every 2 weeks on Thursdays  76 mg or 0.38 mL Past Week at Unknown time Yes Unknown, Entered By History   acetylcysteine (MUCOMYST) 20 % neb solution Take 2 mLs by nebulization 4 times daily With albuterol at 0700, 1100, 1500, and 1900  3/23/2020 at 2100  Unknown, Entered By History   albuterol (PROVENTIL) (5 MG/ML) 0.5% neb solution Take 2.5 mg by nebulization every 4 hours (while awake) 0700 1100 1500 1900 with mucomyst  3/23/2020  Unknown, Entered By History   aspirin (ASA) 81 MG chewable tablet 81 mg by Oral or Feeding Tube route daily At 0900 3/23/2020 at 0900  Unknown, Entered By  History   bacitracin ointment Apply topically daily as needed for wound care To PEG site.  3/23/2020 at 2100  Unknown, Entered By History   Bioflavonoid Products (VITAMIN C PLUS) 1000 MG TABS 1 tablet by Oral or FT or NG tube route 3/23/2020  Unknown, Entered By History   Brivaracetam (BRIVIACT) 10 MG/ML solution 100 mg by Oral or Feeding Tube route 2 times daily 0900, 2100   Unknown, Entered By History   calcium carbonate 1250 (500 CA) MG/5ML SUSP suspension 5 mLs (1,250 mg) by Per J Tube route 3 times daily (with meals) 3/23/2020 at 1500  Mariana Venegas MD   carBAMazepine (TEGRETOL) 100 MG/5ML suspension 150 mg by Oral or Feeding Tube route every 6 hours At 06:00, 12:00, 18:00 and 24:00 for seizures  3/23/2020 at 1800  Unknown, Entered By History   ferrous sulfate 220 (44 Fe) MG/5ML ELIX 220 mg by Per Feeding Tube route daily 3/23/2020 at 0900  Unknown, Entered By History   Guar Gum (FIBER MODULAR, NUTRISOURCE FIBER,) packet 1 packet by Per G Tube route daily 3/23/2020 at 0900  Starr Champion MD   hydrocortisone (CORTEF) 5 MG tablet 10 mg by Oral or FT or NG tube route daily (with dinner) At 1500 3/23/2020 at 1500  Unknown, Entered By History   hydrocortisone (CORTEF) 5 MG tablet 20 mg by Oral or FT or NG tube route every morning  3/23/2020 at 0900  Unknown, Entered By History   hydrocortisone 1 % CREA cream Place rectally 2 times daily as needed for other Apply to reddened memo areas as needed 3/23/2020  Unknown, Entered By History   levothyroxine (SYNTHROID/LEVOTHROID) 150 MCG tablet Take 150 mcg by mouth every morning 3/23/2020 at 0900  Unknown, Entered By History   melatonin (MELATONIN) 1 MG/ML LIQD liquid 6 mg by Per NG tube route At Bedtime  3/22/2020  Unknown, Entered By History   metoclopramide (REGLAN) 5 MG/5ML solution 5 mg by Per Feeding Tube route 2 times daily 3/23/2020 at 2100  Unknown, Entered By History   order for DME Equipment being ordered: Nebulizer   Starr Champion MD   pantoprazole  (PROTONIX) 2 mg/mL SUSP suspension 20 mLs (40 mg) by Per J Tube route daily 3/23/2020 at 0900  Washington Connors MD   potassium & sodium phosphates (NEUTRA-PHOS) 280-160-250 MG Packet Take 1 packet by mouth 3 times daily  3/23/2020 at 1500  Yanely Liriano MD   Scopolamine HBr POWD Dispense #90. Mix contents with small amount of water for admin via J-tube.  Administer 0.8 mg three times each day. 3/23/2020 at 1500  Jennie Bermudez MD   sodium bicarbonate 650 MG tablet Take 1 tablet (650 mg) by mouth daily More than a month at Unknown time  Ricky Torres MD   vitamin D3 (CHOLECALCIFEROL) 2000 units (50 mcg) tablet Take 2,000 Units by mouth daily Crush and feed via j-tube @@ 0900 3/23/2020 at 0900  Unknown, Entered By History

## 2020-03-24 NOTE — PROGRESS NOTES
DATE & TIME: 3/23 8387-2958    Cognitive Concerns/ Orientation : HECTOR; patient non-verbal at baseline, follows commands, able to nod yes and no   BEHAVIOR & AGGRESSION TOOL COLOR: green  ABNL VS/O2: on RA. RR increased, afebrile on shift, soft BP.   MOBILITY: total care, T&R q2hrs.   PAIN MANAGMENT: non-verbal pain indicators absent   DIET: NPO; nutrition consult for tube feeding   BOWEL/BLADDER: incontinent of bowel and bladder, no BM on shift   ABNL LAB/BG: Lactic 2.9   DRAIN/DEVICES: PIV infusing NS. PEG tube   TELEMETRY RHYTHM: na  SKIN: blanchable redness to coccyx and R elbow - mepilex x2 CDI. Pale, scattered bruises   TESTS/PROCEDURES: na  D/C DAY/GOALS/PLACE: pending   OTHER IMPORTANT INFO: COVID rule out, droplet and contact precautions initiated.     Admission    Patient arrives to room 628-1 via cart from ED.  VSS on RA.     Inpatient nursing criteria listed below were met:    PCD's Documented: NA  Skin issues/needs documented :Yes  Isolation education started/completed Yes  Patient allergies verified with patient: NA  Verified completion of Petroleum Risk Assessment Tool:  Yes  Verified completion of Guardianship screening tool: Yes  Fall Prevention: Care plan updated, Education given and documented Yes  Care Plan initiated: Yes  Home medications documented in belongings flowsheet: NA  Patient belongings documented in belongings flowsheet: NA  Reminder note (belongings/ medications) placed in discharge instructions:NA  Admission profile/ required documentation complete: Yes  Bedside Report Letter given and explained to patient No

## 2020-03-24 NOTE — PLAN OF CARE
DATE & TIME: 3/24/2020 [id number]   Cognitive Concerns/ Orientation : HECTOR; patient non-verbal at baseline, follows commands, able to nod yes and no & thumb up and down.   BEHAVIOR & AGGRESSION TOOL COLOR: green, can be uncooperative during cares.  ABNL VS/O2: VSS on RA.   MOBILITY: total care, chairfast, T&R Q2h.   PAIN MANAGMENT: Non-verbal pain indicators absent.  DIET: NPO; continuous feeding tube.   BOWEL/BLADDER: Incontinent of bowel and bladder.  ABNL LAB/BG: Lactic 2.2, MD notified.   DRAIN/DEVICES: PIV infusing NS. PEG tube.  TELEMETRY RHYTHM: N/A  SKIN: blanchable redness to coccyx, mepilex CDI. Pale, scattered bruises.  TESTS/PROCEDURES: COVID rule out, results pending.  D/C DAY/GOALS/PLACE: Pending   OTHER IMPORTANT INFO: Droplet and contact precautions maintained. Continues on IV abx.

## 2020-03-24 NOTE — PROGRESS NOTES
[facility]    Hospitalist Progress Note    Assessment & Plan   Keyon Farias is a 57 year old male with PMHx of TBI nonverbal at baseline with chronic  R sided spastic hemiplegia, dysphagia with G-tube for TFs/meds, seizure disorder, panhypopituitarism and frequent hospital admissions for recurrent pneumonia who was admitted on 3/23/2020 with fever and suspected recurrent aspiration pneumonia.     Sepsis due to aspiration pneumonia vs pneumonitis  Recurrent aspiration  Has had several hospitalizations this calendar year for respiratory issues dt recurrent aspiration (including pneumonia, pneumonitis). Despite TFs and appropriate precautions he continues to aspirate frequently. On prior evaluations per ID service, favored aspiration pneumonitis as opposed to aspiration pneumonia. On presentation this stay, was febrile with T 102, tachycardic (HR 110s) and mildly hypotensive (BPs 100s systolic). WBC 16.2. Lactate 2.9. Procalcitonin neg. Rapid influenza swab was neg. CXR showed worsening interstitial infiltrates in the lower lungs bilaterally and possible developing alveolar infiltrates vs consolidation in the RLL. Zosyn was started on admission.   -- fevers resolved, O2 sats remain stable on RA, repeat lactate pending  -- cont Zosyn   -- cont mucomyst and albuterol nebs as per prior to admission  -- cont supportive cares with IVFs, Tylenol prn  -- cont contact and droplet precautions until COVID19 infection ruled out (test sent from ED on 3/23 PM)  -- cont frequent oral cares     TBI  Seizure disorder secondary to TBI  With spastic hemiplegia, dysphagia with recurrent aspiration. Has feeding tube.  -- cont TFs  -- cont seizure meds per home regimen  -- cont skin cares per routine given hx of pressure injuries and skin breakdown      Panhypopituitarism  Chronic and stable. Maintained on hydrocortisone, levothyroxine and testosterone.   Given one dose of IV solumedrol on admission.   -- remains  hemodynamically stable so no need for stress-dosed steroids at this time, cont home dose of hydrocortisone  -- cont levothyroxine as per prior to admission    FEN: cont NS@100ml/h, lytes stable, NPO, TFs via G-tube per nutritionist  DVT Prophylaxis: Lovenox  Code Status: Full Code    Disposition: Continue to monitor in hospital stay. Discharge home with resumption of home care services in 2-3d, pending continued clinical improvement.    Sun Keita    Interval History   No concerns overnight. Seen this morning. Resting comfortably. Opens eyes to name and looks around room, smiles and periodically vocalizes. Breathing non-labored. NAD.    -Data reviewed today: I reviewed all new labs and imaging results over the last 24 hours. I personally reviewed no images or EKG's today.    Physical Exam   Temp: 97.8  F (36.6  C) Temp src: Axillary BP: 100/59 Pulse: 109 Heart Rate: 89 Resp: 22 SpO2: 96 % O2 Device: None (Room air)    There were no vitals filed for this visit.  Vital Signs with Ranges  Temp:  [97.8  F (36.6  C)-102.3  F (39.1  C)] 97.8  F (36.6  C)  Pulse:  [109-112] 109  Heart Rate:  [] 89  Resp:  [16-30] 22  BP: (100-117)/(57-74) 100/59  SpO2:  [91 %-97 %] 96 %  No intake/output data recorded.    Constitutional: Resting comfortably, alert and looking at staff, smiling and occasionally vocalizing, NAD   Respiratory: CTAB, no wheeze/rales/rhonchi, no increased work of breathing  Cardiovascular: HRRR, no MGR, no LE edema  GI: S, NT, ND, +BS  Skin/Integumen: warm/dry  Other:      Medications     dextrose       sodium chloride 100 mL/hr at 03/24/20 0107       enoxaparin ANTICOAGULANT  40 mg Subcutaneous Q24H     fiber modular (NUTRISOURCE FIBER)  1 packet Per Feeding Tube Daily     piperacillin-tazobactam  4.5 g Intravenous Q6H     sodium chloride (PF)  3 mL Intracatheter Q8H       Data   Recent Labs   Lab 03/23/20  2122   WBC 16.2*   HGB 13.4   MCV 87         POTASSIUM 4.3   CHLORIDE 99    CO2 28   BUN 18   CR 0.77   ANIONGAP 6   STEVE 8.4*   *   ALBUMIN 3.1*   PROTTOTAL 7.8   BILITOTAL 0.3   ALKPHOS 107   ALT 30   AST 29       Recent Results (from the past 24 hour(s))   XR Chest Port 1 View    Narrative    EXAM: XR CHEST PORT 1 VW  LOCATION: [facility]  DATE/TIME: 3/23/2020 9:43 PM    INDICATION: Fever  COMPARISON: 02/17/2020      Impression    IMPRESSION: Hazy underlying interstitial infiltrates involving in the lower lungs bilaterally have increased since prior examination. Possibly developing alveolar infiltrate or consolidation right lower lung suspicious for associated superimposed   alveolar pneumonia on interstitial pneumonitis. Normal heart size. Normal pulmonary vascularity. Tortuous aorta. Degenerative changes in the spine and shoulders.

## 2020-03-24 NOTE — PROGRESS NOTES
[facility]  Patient is currently open to home care services with [facility]. The patient is currently receiving PT/OT and Speech Therapy services. Critical access hospital  and team have been notified of patient admission. Critical access hospital liaison will continue to follow patient during stay. If appropriate provide orders to resume home care at time of discharge.

## 2020-03-24 NOTE — CONSULTS
CLINICAL NUTRITION SERVICES  -  ASSESSMENT NOTE    RECOMMENDATIONS FOR MD/PROVIDER TO ORDER:   - Start NeutraPhos TID per home regimen   Recommendations Ordered by Registered Dietitian (RD):   Recommend TF as follows:     Type of Feeding Tube: JT (chronic, last replaced in Jan 2020)    Enteral Frequency:  Cycled during the day    Enteral Regimen: Isosource 1.5 at 100 mL/hr x 12 hours    Run 8 am - 8 pm    Total Enteral Provisions:     1200 mL provides 1800 kcal, 82 gm protein, 211 gm CHO, 18 gm fiber and 912 mL H20.    Add 1 pkt NutriSource Fiber for 3 g additional fiber    Meets > 100% of DRI's.    Free water flush of 60 mL q4 hours, increase to 200 mL q4 hours once IVF off    Add 60 ml before and after each cycle      Daily electrolyte check, Phos add on    Daily weights    OK to start at goal   Malnutrition: Unable to fully assess     REASON FOR ASSESSMENT  Keyon Farias is a 59 year old male seen by Registered Dietitian for Provider Order - Registered Dietitian to Assess and Order TF per Medical Nutrition Therapy Protocol    NUTRITION HISTORY  - Information obtained from EMR and spoke to patient's mother/caregiver, Savannah, over the phone this morning.   - Home EN regimen has been running as follows:    - Via JT, HOB elevated   - Jevity 1.5 @ 90 - 100 ml/hr x 11 hours (run 5 am-4 pm)  - Savannah reports that she feeds Keyon two hours on, one hour off.    - flush 60 ml each time feeding is turned on or off, with additional 9703-0500 ml free water daily.    - Recent body weight ~165#, though Savannah admits she does not have a way to weigh him at home.   - NKFA    CURRENT NUTRITION ORDERS  Diet Order:     NPO (baseline)    Current Intake/Tolerance:  No EN run since yesterday.     NUTRITION FOCUSED PHYSICAL ASSESSMENT FOR DIAGNOSING MALNUTRITION)  No:  Unable to assess - current precaution for COVID19 Pandemic                Observed:    Unable    Obtained from Chart/Interdisciplinary Team:  [facility] - Nutrition 3: Total  13  Last BM PTA    ANTHROPOMETRICS  Height: 6'  Weight: 70.4 kg (last wt on file - 2/21/2020)  BMI: Not validated  IBW: 80.9 kg  % IBW: no current wt on file  Weight History:   Wt Readings from Last 10 Encounters:   02/21/20 70.4 kg (155 lb 3.3 oz)   02/12/20 72.9 kg (160 lb 11.5 oz)   01/30/20 74.8 kg (165 lb)   01/29/20 74.8 kg (165 lb)   01/02/20 71.7 kg (158 lb)   12/25/19 75.1 kg (165 lb 9.1 oz)   12/18/19 76.7 kg (169 lb)   11/15/19 72.6 kg (160 lb)   11/11/19 72.9 kg (160 lb 12.8 oz)   10/28/19 73.6 kg (162 lb 4.1 oz)     LABS  Labs reviewed    MEDICATIONS  Medications reviewed  NaCl IVF @ 100 mL/hr     ASSESSED NUTRITION NEEDS PER APPROVED PRACTICE GUIDELINES:  Dosing Weight No current weight on file  Estimated Energy Needs: 20-25 kcals/kg  Justification: maintenance, limited mobility   Estimated Protein Needs: 1.2-1.5 grams protein/kg  Justification: preservation of lean body mass  Estimated Fluid Needs: >1 mL/kcal  Justification: maintenance    MALNUTRITION:  % Weight Loss:  NO WT ON FILE  % Intake:  Decreased intake does not meet criteria for malnutrition   Subcutaneous Fat Loss:  Unable to assess  Muscle Loss:  Unable to assess  Fluid Retention:  None noted    Malnutrition Diagnosis: Unable to determine due to lack of NFPE, no current wt on file    NUTRITION DIAGNOSIS:  Inadequate enteral nutrition infusion related to EN off with hospitalization, awaiting orders as evidenced by No EN run since yesterday.     NUTRITION INTERVENTIONS  Recommendations / Nutrition Prescription  Recommend TF as follows:     Type of Feeding Tube: JT (chronic, last replaced in Jan 2020)    Enteral Frequency:  Cycled during the day    Enteral Regimen: Isosource 1.5 at 100 mL/hr x 12 hours    Run 8 am - 8 pm    Total Enteral Provisions:     1200 mL provides 1800 kcal, 82 gm protein, 211 gm CHO, 18 gm fiber and 912 mL H20.    Add 1 pkt NutriSource Fiber for 3 g additional fiber    Meets > 100% of DRI's.    Free water flush of 60  mL q4 hours, increase to 200 mL q4 hours once IVF off    Add 60 ml before and after each cycle      Daily electrolyte check, Phos add on    Daily weights    OK to start at goal      Implementation  Nutrition education: Provided education on EN restart, Pt's mother approved plan.   EN Composition, EN Schedule, Feeding Tube Flush: above  Collaboration and Referral of Nutrition care: recommend NeutraPhos TID.     Nutrition Goals  EN at goal to provide % estimated needs.       MONITORING AND EVALUATION:  Progress towards goals will be monitored and evaluated per protocol and Practice Guidelines    Marisel Fernández RD,   [facility], [address] [facility], [facility]   Pager: [phone]  Weekend Pager: [phone]

## 2020-03-24 NOTE — PLAN OF CARE
DATE & TIME: 3/24/2020 4307-7391  Cognitive Concerns/ Orientation : HECTOR; patient non-verbal at baseline, follows commands, able to nod yes/no  BEHAVIOR & AGGRESSION TOOL COLOR: yellow, uncooperative during cares, grabbing/pinching/poking at staff, inappropriate.  ABNL VS/O2: VSS on RA.   MOBILITY: total care, T&R Q2h.   PAIN MANAGMENT: denies pain  DIET: NPO; continuous tube feeding.   BOWEL/BLADDER: Incontinent of bowel and bladder.  ABNL LAB/BG: Lactic 2.2, MD notified.   DRAIN/DEVICES: PIV infusing NS. PEG tube.  TELEMETRY RHYTHM: N/A  SKIN: blanchable redness to coccyx, mepilex CDI. Pale, scattered bruises.  TESTS/PROCEDURES: COVID rule out, results pending.  D/C DAY/GOALS/PLACE: Pending   OTHER IMPORTANT INFO: Droplet and contact precautions maintained. Continues on IV abx.

## 2020-03-24 NOTE — CONSULTS
Care Transition Initial Assessment - SW     Spoke by phone with: Patient's mother and guardian, Savannah at [phone]  Active Problems:    Aspiration pneumonia (H)       DATA  Lives With: Mother     Identified issues/concerns regarding health management: A social work consult was ordered for discharge planning. Patient is a 59 year old male with a TBI. He has a feeding tube and lives with his mother and guardian, Savannah.  Patient currently receives the following services:   [facility] (P:[phone], Fax:[phone])for RN extended hour coverage, up to 12 hours daily.  He receives PCA services adarsh/night through [employer] [employer] ([phone]) currently 84 hours every 7 days. Homemaking services are provided for 5 hours weekly.  TF with supplies through [employer].  Patient is open to home care services with [facility]. He is currently receiving PT, OT and SLP services. [facility] liaison will follow patient during stay.  Spoke with Savannah who confirms the plan is for patient to return home with these services at discharge. Stretcher transport will be needed. Savannah expressed frustration that she can't visit now due to visitor restrictions.          ASSESSMENT  Cognitive Status: Did not speak with patient  Concerns to be addressed: SW/ RN Care Coordinator will follow for discharge coordination   PLAN  Financial costs for the patient includes: None anticipated . Patient has Medicare and [place]  Patient/family is agreeable to the plan? Yes  Patient/family Goals and Preferences: Home with resumption of services

## 2020-03-24 NOTE — ED PROVIDER NOTES
"  History     Chief Complaint:  Fever    HPI   Keyon Farias is a 59 year old male who presents with fever today.    Patient is a 59-year-old male with a history of traumatic brain injury.  Due to a aphasia patient is unable to communicate and is a poor historian.  Care was discussed with his mother Savannah who is his guardian and lives at home with him her independently and a caregiver.  Patient was doing well up until today around 7 PM this evening the caregiver measured a temperature of 101.7.  The mother states she was going to \"watch it.  An hour later they rechecked his temperature and it was over 102 which prompted the call to 911 to bring him to the hospital yet again for assessment for fever and concern for infection.  Patient has a history of recurrent pneumonia.  Patient has what is basically a chronic cough.  No other concerns are raised by his mother after a phone call from the emergency room to assess the history.    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  TBI  DVT of upper extremity  GERD  Panhypopituitarism  Pneumonia  Seizures   Septic shock  Thyroid disease  Tracheostomy care  Cerebral artery occlusion with cerebral infarction  UTI  Ventricular fibrillation     Past Surgical History:    EGD combined x3  Reconstructive facial surgery   Jejunostomy tube placement  Appendectomy  IR PICC placement, left   Insertion tube gastrotomy  Right hand repair  Tracheostomy  Vascular surgery     Family History:    Cancer      Social History:  Smoking status: Former: Quit date: 1989  Alcohol use: No  Drug use: No   The patient presents to the emergency department alone  PCP: Carlos Gomez  Marital Status:  Single     Review of Systems   Constitutional: Positive for fever.   All other systems reviewed and are negative.        Physical Exam     Patient " Vitals for the past 24 hrs:   BP Temp Temp src Heart Rate SpO2   03/23/20 2147 -- 102.3  F (39.1  C) Rectal -- --   03/23/20 2100 117/74 99.3  F (37.4  C) Oral 121 95 %       Physical Exam  Vitals signs reviewed.   Constitutional:       Comments: Chronically ill-appearing contracted nonverbal uses left hand to communicate   HENT:      Head: Normocephalic.      Right Ear: Tympanic membrane normal.      Left Ear: Tympanic membrane normal.      Nose: Nose normal.      Mouth/Throat:      Mouth: Mucous membranes are dry.   Eyes:      General: No scleral icterus.     Pupils: Pupils are equal, round, and reactive to light.   Neck:      Musculoskeletal: Normal range of motion.   Cardiovascular:      Rate and Rhythm: Regular rhythm. Tachycardia present.   Pulmonary:      Effort: Pulmonary effort is normal.      Breath sounds: Normal breath sounds.   Abdominal:      General: Abdomen is flat.      Palpations: Abdomen is soft.      Comments: PEG tube extending from the left mid abdomen no erythema or swelling   Musculoskeletal: Normal range of motion.   Skin:     General: Skin is warm.      Capillary Refill: Capillary refill takes less than 2 seconds.   Neurological:      Mental Status: He is alert. Mental status is at baseline.   Psychiatric:      Comments: Difficult to assess due to nonverbal state            Emergency Department Course   Imaging:  Radiographic findings were communicated with the patient and family who voiced understanding of the findings.    X-ray chest port, 1 view:  IMPRESSION: Hazy underlying interstitial infiltrates involving in the lower lungs bilaterally have increased since prior examination. Possibly developing alveolar infiltrate or consolidation right lower lung suspicious for associated superimposed alveolar pneumonia on interstitial pneumonitis. Normal heart size. Normal pulmonary vascularity. Tortuous aorta. Degenerative changes in the spine and shoulders.  Result per radiology.      Laboratory:  Laboratory findings were communicated with the patient who voiced understanding of the findings.    CBC: WBC 16.2 (H), WNL (HGB 13.4, )   CMP: glucose 105 (H), calcium 8.4 (L), albumin 3.1 (L), o/w WNL (Creatinine 0.77)  Blood Culture x2: Pending    (2122)Lactic Acid: 2.1 (H)  (2222) Lactic Acid: 2.9 (H)    Procalcitonin: <0.05    UA: glucose 30, pH 8.5 (H), albumin 10, o/w Negative    Interventions:  2202 Acetaminophen 1 g Per PEG tube    2209 NS 1 L IV Bolus      Emergency Department Course:  Past medical records, nursing notes, and vitals reviewed.  2055: I performed an exam of the patient and obtained history, as documented above.    The patient was sent for a chest xray while in the emergency department, findings above.  IV inserted and blood drawn. This was sent to laboratory for testing, findings above.   Urinalysis and culture obtained and sent for evaluation.    2310: I spoke with the patient's mother on the phone and discussed admitting the patient, based on his workup. Findings and plan explained to the mother who consents to admission.     2312: Discussed the patient with Dr. Galan, who will admit the patient to a medicine bed for further monitoring, evaluation, and treatment.     Impression & Plan    Medical Decision Making:  Patient presents with fever patient has a history of over 20 visits over the last year with similar presentations has a history of recurring possible aspiration pneumonia history of prior sepsis is pan hypo-pituitary.  On arrival vitals are stable though tachycardic and febrile.  IV fluids and antipyretics were given with some response lactic acid was borderline on arrival came slightly up after 1 L of fluid procalcitonin is negative but chest x-ray is read by radiology as possible worsening bilateral interstitial infiltrates.  My clinical evaluation is likely recurring fever in the setting of aspiration pneumonitis did discuss with the mother the  possibility of sending home pending cultures but due to patient's chronic illness and x-ray reading feel more comfortable with a dose of IV antibiotics and observation in the hospital.  Care is discussed with Helio Galan MD and admitted for IV antibiotics to concerns for bilateral lower lobe interstitial infiltrates and fever.      Diagnosis:    ICD-10-CM    1. Aspiration pneumonia of both lungs due to gastric secretions, unspecified part of lung (H)  J69.0 COVID-19 Virus (Coronavirus) PCR to [place] [facility] Nasopharyngeal (NP) Swab in [facility]       Disposition:  Admitted to Dr. Galan.    Supriya Matute  3/23/2020    EMERGENCY DEPARTMENT    Scribe Disclosure:  Supriya DYKES, am serving as a scribe at 8:55 PM on 3/23/2020 to document services personally performed by Brandon Christine MD, based on my observations and the provider's statements to me.         Brandon Christine MD  03/23/20 8019

## 2020-03-25 LAB
ANION GAP SERPL CALCULATED.3IONS-SCNC: 4 MMOL/L (ref 3–14)
BUN SERPL-MCNC: 12 MG/DL (ref 7–30)
CALCIUM SERPL-MCNC: 8.1 MG/DL (ref 8.5–10.1)
CHLORIDE SERPL-SCNC: 112 MMOL/L (ref 94–109)
CO2 SERPL-SCNC: 27 MMOL/L (ref 20–32)
CREAT SERPL-MCNC: 0.9 MG/DL (ref 0.66–1.25)
ERYTHROCYTE [DISTWIDTH] IN BLOOD BY AUTOMATED COUNT: 15.1 % (ref 10–15)
GFR SERPL CREATININE-BSD FRML MDRD: >90 ML/MIN/{1.73_M2}
GLUCOSE BLDC GLUCOMTR-MCNC: 70 MG/DL (ref 70–99)
GLUCOSE SERPL-MCNC: 78 MG/DL (ref 70–99)
HCT VFR BLD AUTO: 37.4 % (ref 40–53)
HGB BLD-MCNC: 11.8 G/DL (ref 13.3–17.7)
MCH RBC QN AUTO: 28.4 PG (ref 26.5–33)
MCHC RBC AUTO-ENTMCNC: 31.6 G/DL (ref 31.5–36.5)
MCV RBC AUTO: 90 FL (ref 78–100)
PHOSPHATE SERPL-MCNC: 3.4 MG/DL (ref 2.5–4.5)
PLATELET # BLD AUTO: 174 10E9/L (ref 150–450)
POTASSIUM SERPL-SCNC: 3.6 MMOL/L (ref 3.4–5.3)
RBC # BLD AUTO: 4.16 10E12/L (ref 4.4–5.9)
SODIUM SERPL-SCNC: 143 MMOL/L (ref 133–144)
WBC # BLD AUTO: 8.9 10E9/L (ref 4–11)

## 2020-03-25 PROCEDURE — 25000132 ZZH RX MED GY IP 250 OP 250 PS 637: Mod: GY | Performed by: INTERNAL MEDICINE

## 2020-03-25 PROCEDURE — 25000132 ZZH RX MED GY IP 250 OP 250 PS 637: Mod: GY | Performed by: HOSPITALIST

## 2020-03-25 PROCEDURE — 94640 AIRWAY INHALATION TREATMENT: CPT

## 2020-03-25 PROCEDURE — 25000125 ZZHC RX 250: Performed by: INTERNAL MEDICINE

## 2020-03-25 PROCEDURE — 25000125 ZZHC RX 250: Performed by: HOSPITALIST

## 2020-03-25 PROCEDURE — 85027 COMPLETE CBC AUTOMATED: CPT | Performed by: HOSPITALIST

## 2020-03-25 PROCEDURE — 94640 AIRWAY INHALATION TREATMENT: CPT | Mod: 76

## 2020-03-25 PROCEDURE — 25800030 ZZH RX IP 258 OP 636: Performed by: HOSPITALIST

## 2020-03-25 PROCEDURE — 99232 SBSQ HOSP IP/OBS MODERATE 35: CPT | Performed by: INTERNAL MEDICINE

## 2020-03-25 PROCEDURE — 40000275 ZZH STATISTIC RCP TIME EA 10 MIN

## 2020-03-25 PROCEDURE — 25000128 H RX IP 250 OP 636: Performed by: HOSPITALIST

## 2020-03-25 PROCEDURE — 12000000 ZZH R&B MED SURG/OB

## 2020-03-25 PROCEDURE — 36415 COLL VENOUS BLD VENIPUNCTURE: CPT | Performed by: HOSPITALIST

## 2020-03-25 PROCEDURE — 00000146 ZZHCL STATISTIC GLUCOSE BY METER IP

## 2020-03-25 PROCEDURE — 27210429 ZZH NUTRITION PRODUCT INTERMEDIATE LITER

## 2020-03-25 PROCEDURE — 80048 BASIC METABOLIC PNL TOTAL CA: CPT | Performed by: HOSPITALIST

## 2020-03-25 PROCEDURE — 25000125 ZZHC RX 250

## 2020-03-25 PROCEDURE — 84100 ASSAY OF PHOSPHORUS: CPT | Performed by: HOSPITALIST

## 2020-03-25 RX ORDER — ALBUTEROL SULFATE 0.83 MG/ML
SOLUTION RESPIRATORY (INHALATION)
Status: COMPLETED
Start: 2020-03-25 | End: 2020-03-25

## 2020-03-25 RX ORDER — ALBUTEROL SULFATE 0.83 MG/ML
SOLUTION RESPIRATORY (INHALATION)
Status: DISCONTINUED
Start: 2020-03-25 | End: 2020-03-25 | Stop reason: WASHOUT

## 2020-03-25 RX ADMIN — CARBAMAZEPINE 150 MG: 100 SUSPENSION ORAL at 17:33

## 2020-03-25 RX ADMIN — ALBUTEROL SULFATE 2.5 MG: 2.5 SOLUTION RESPIRATORY (INHALATION) at 11:41

## 2020-03-25 RX ADMIN — Medication 0.8 MG: at 08:38

## 2020-03-25 RX ADMIN — SODIUM BICARBONATE 650 MG TABLET 650 MG: at 08:36

## 2020-03-25 RX ADMIN — CARBAMAZEPINE 150 MG: 100 SUSPENSION ORAL at 00:29

## 2020-03-25 RX ADMIN — LEVOTHYROXINE SODIUM 150 MCG: 75 TABLET ORAL at 08:37

## 2020-03-25 RX ADMIN — METOCLOPRAMIDE 5 MG: 5 SOLUTION ORAL at 21:12

## 2020-03-25 RX ADMIN — CARBAMAZEPINE 150 MG: 100 SUSPENSION ORAL at 11:11

## 2020-03-25 RX ADMIN — CHOLECALCIFEROL TAB 50 MCG (2000 UNIT) 2000 UNITS: 50 TAB at 08:37

## 2020-03-25 RX ADMIN — ACETYLCYSTEINE 2 ML: 200 SOLUTION ORAL; RESPIRATORY (INHALATION) at 11:41

## 2020-03-25 RX ADMIN — ENOXAPARIN SODIUM 40 MG: 40 INJECTION SUBCUTANEOUS at 00:30

## 2020-03-25 RX ADMIN — Medication 0.8 MG: at 22:29

## 2020-03-25 RX ADMIN — POTASSIUM & SODIUM PHOSPHATES POWDER PACK 280-160-250 MG 1 PACKET: 280-160-250 PACK at 17:30

## 2020-03-25 RX ADMIN — IPRATROPIUM BROMIDE AND ALBUTEROL SULFATE 3 ML: .5; 3 SOLUTION RESPIRATORY (INHALATION) at 07:14

## 2020-03-25 RX ADMIN — POTASSIUM & SODIUM PHOSPHATES POWDER PACK 280-160-250 MG 1 PACKET: 280-160-250 PACK at 08:36

## 2020-03-25 RX ADMIN — ASPIRIN 81 MG 81 MG: 81 TABLET ORAL at 08:37

## 2020-03-25 RX ADMIN — METOCLOPRAMIDE 5 MG: 5 SOLUTION ORAL at 08:36

## 2020-03-25 RX ADMIN — ACETYLCYSTEINE 2 ML: 200 SOLUTION ORAL; RESPIRATORY (INHALATION) at 16:25

## 2020-03-25 RX ADMIN — PIPERACILLIN AND TAZOBACTAM 4.5 G: 4; .5 INJECTION, POWDER, FOR SOLUTION INTRAVENOUS at 06:39

## 2020-03-25 RX ADMIN — PIPERACILLIN AND TAZOBACTAM 4.5 G: 4; .5 INJECTION, POWDER, FOR SOLUTION INTRAVENOUS at 16:30

## 2020-03-25 RX ADMIN — PIPERACILLIN AND TAZOBACTAM 4.5 G: 4; .5 INJECTION, POWDER, FOR SOLUTION INTRAVENOUS at 00:29

## 2020-03-25 RX ADMIN — ALBUTEROL SULFATE 2.5 MG: 2.5 SOLUTION RESPIRATORY (INHALATION) at 16:26

## 2020-03-25 RX ADMIN — CARBAMAZEPINE 150 MG: 100 SUSPENSION ORAL at 06:40

## 2020-03-25 RX ADMIN — POTASSIUM & SODIUM PHOSPHATES POWDER PACK 280-160-250 MG 1 PACKET: 280-160-250 PACK at 21:12

## 2020-03-25 RX ADMIN — Medication 1 PACKET: at 08:36

## 2020-03-25 RX ADMIN — SODIUM CHLORIDE: 9 INJECTION, SOLUTION INTRAVENOUS at 04:38

## 2020-03-25 RX ADMIN — HYDROCORTISONE 10 MG: 10 TABLET ORAL at 14:36

## 2020-03-25 RX ADMIN — IPRATROPIUM BROMIDE AND ALBUTEROL SULFATE 3 ML: .5; 3 SOLUTION RESPIRATORY (INHALATION) at 20:09

## 2020-03-25 RX ADMIN — ACETYLCYSTEINE 2 ML: 200 SOLUTION ORAL; RESPIRATORY (INHALATION) at 07:14

## 2020-03-25 RX ADMIN — BRIVARACETAM 100 MG: 10 SOLUTION ORAL at 08:50

## 2020-03-25 RX ADMIN — ACETYLCYSTEINE 2 ML: 200 SOLUTION ORAL; RESPIRATORY (INHALATION) at 20:09

## 2020-03-25 RX ADMIN — HYDROCORTISONE 20 MG: 20 TABLET ORAL at 08:37

## 2020-03-25 RX ADMIN — Medication 40 MG: at 08:37

## 2020-03-25 RX ADMIN — Medication 0.8 MG: at 17:36

## 2020-03-25 RX ADMIN — BRIVARACETAM 100 MG: 10 SOLUTION ORAL at 21:13

## 2020-03-25 RX ADMIN — PIPERACILLIN AND TAZOBACTAM 4.5 G: 4; .5 INJECTION, POWDER, FOR SOLUTION INTRAVENOUS at 22:29

## 2020-03-25 RX ADMIN — MINERAL SUPPLEMENT IRON 300 MG / 5 ML STRENGTH LIQUID 100 PER BOX UNFLAVORED 220 MG: at 08:36

## 2020-03-25 RX ADMIN — PIPERACILLIN AND TAZOBACTAM 4.5 G: 4; .5 INJECTION, POWDER, FOR SOLUTION INTRAVENOUS at 11:12

## 2020-03-25 ASSESSMENT — ACTIVITIES OF DAILY LIVING (ADL)
ADLS_ACUITY_SCORE: 45
ADLS_ACUITY_SCORE: 42
ADLS_ACUITY_SCORE: 45
ADLS_ACUITY_SCORE: 45

## 2020-03-25 NOTE — PLAN OF CARE
HECTOR orientation, pt nonverbal. Nods head yes/no, uses thumbs up/down. Denies pain. NPO, TF at goal via PEG tube. Up with lift. T/R q2h/prn. Incontinent. Mepilex on coccyx for nonblanchable erythema, protective mepilex on R elbow for blanchable erythema. Baseline R side spastic hemiplegia. Coarse LS in bases, nonproductive infrequent cough. VSS on room air. Contact iso maintained. Plan to discharge home with Mom and HHC [facility].

## 2020-03-25 NOTE — PLAN OF CARE
DATE & TIME: 3/24 from 1900 to 0730    Cognitive Concerns/ Orientation : alert, non-verbal at baseline, nods yes/no   BEHAVIOR & AGGRESSION TOOL COLOR: Green, cooperative most of the shift except uncooperative with cares/turn and repositioning, grabs and pinches staff at times.   CIWA SCORE: N/A   ABNL VS/O2: VSS on RA  MOBILITY: Total cares, Turn and reposition Q 2 hours  PAIN MANAGMENT: No non-verbal communication indicator noted.   DIET: NPO, Tube feeding from 8 am to 8 pm  BOWEL/BLADDER: Incontinent to bladder and bowel  ABNL LAB/BG: Cl 110. Lactic 2.2, MD is aware, receiving maintanance fluid  at 100 mL/hr, WBC 11.8, Hgb 12.0, , Ca 8.1  DRAIN/DEVICES: G-tube, PIV infusing at 100 mL/hr  TELEMETRY RHYTHM: N/A  SKIN: Blanchable redness to coccyx area. Meplex CDI, pale, scattered bruises.    TESTS/PROCEDURES: COVID-19 negative  D/C DAY/GOALS/PLACE: Discharge 2-3 days pending in progress  OTHER IMPORTANT INFO: COVID 19 negative. 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 Receiving IV abx

## 2020-03-25 NOTE — PROGRESS NOTES
Care Coordination:    Following for discharge planning.  Per MD Pt will be ready to discharge on 3/26 with resumption of home care.    Spoke with Pt's mom Savannah who is relieved Pt will be returning home tomorrow.  Wants [facility] transport set up.  She will make follow up appointment with clinic.  Voices no other needs for discharge  Email sent to [facility] updating that patient would be returning home on 3/26  Spoke with [facility] ([place]) and updated of discharge plan for 3/26  CC to fax orders to [phone]  Updated [facility] of discharge and they do not need orders faxed.  [facility] Stretcher ride set up for 10:30 on 3/26.  PCS on front of chart  Mother/Bedside nurse updated on discharge.   CC to follow for discharge orders.      Alee Sousa RN BSN  Inpatient Care Coordination  [facility]  [phone]

## 2020-03-25 NOTE — PROVIDER NOTIFICATION
Spoke with ID on-call, COVID results negative. Contact/droplet isolation discontinued but contact isolation for MRSA history continued per ID, notified NANCI Leonard (pts bedside nurse)

## 2020-03-25 NOTE — PROGRESS NOTES
[facility]    Hospitalist Progress Note    Assessment & Plan   Keyon Farias is a 57 year old male with PMHx of TBI nonverbal at baseline with chronic  R sided spastic hemiplegia, dysphagia with G-tube for TFs/meds, seizure disorder, panhypopituitarism and frequent hospital admissions for recurrent pneumonia who was admitted on 3/23/2020 with fever and suspected recurrent aspiration pneumonia.     Sepsis due to aspiration pneumonia vs pneumonitis  Recurrent aspiration  Has had several hospitalizations this calendar year for respiratory issues dt recurrent aspiration (including pneumonia, pneumonitis). Despite TFs and appropriate precautions he continues to aspirate frequently. On prior evaluations per ID service, favored aspiration pneumonitis as opposed to aspiration pneumonia. On presentation this stay, was febrile with T 102, tachycardic (HR 110s) and mildly hypotensive (BPs 100s systolic). WBC 16.2. Lactate 2.9. Procalcitonin neg. Rapid influenza swab was neg. COVID19 PCR neg. CXR showed worsening interstitial infiltrates in the lower lungs bilaterally and possible developing alveolar infiltrates vs consolidation in the RLL. Zosyn and IVFs were started on admission. Fever improved, WBC and lactate normalized. O2 sats remained stable on RA.  -- cont Zosyn -- transition to Augmentin at discharge  -- cont mucomyst and albuterol nebs as per prior to admission  -- cont supportive cares with Tylenol prn  -- cont frequent oral cares     TBI  Seizure disorder secondary to TBI  With spastic hemiplegia, dysphagia with recurrent aspiration. Has feeding tube.  -- cont TFs  -- cont seizure meds per home regimen  -- cont skin cares per routine given hx of pressure injuries and skin breakdown      Panhypopituitarism  Chronic and stable. Maintained on hydrocortisone, levothyroxine and testosterone.   Given one dose of IV solumedrol on admission.   -- remains hemodynamically stable so no need for stress-dosed  steroids at this time, cont home dose of hydrocortisone  -- cont levothyroxine as per prior to admission    FEN: discontinue IVFs, lytes stable, NPO, TFs via G-tube per nutritionist  DVT Prophylaxis: Lovenox  Code Status: Full Code    Disposition: Continue to monitor in hospital today. Discharge home on oral antibiotics with resumption of home care services tomorrow, pending continued clinical improvement.    Sun Keita    Interval History   Uneventful night. Seen this morning. Resting comfortably. Alert and interacting with staff. Breathing non-labored. NAD.    -Data reviewed today: I reviewed all new labs and imaging results over the last 24 hours. I personally reviewed no images or EKG's today.    Physical Exam   Temp: 97.6  F (36.4  C) Temp src: Oral BP: 115/67 Pulse: 62 Heart Rate: 72 Resp: 16 SpO2: 95 % O2 Device: None (Room air)    There were no vitals filed for this visit.  Vital Signs with Ranges  Temp:  [97  F (36.1  C)-97.8  F (36.6  C)] 97.6  F (36.4  C)  Pulse:  [62] 62  Heart Rate:  [61-75] 72  Resp:  [16-22] 16  BP: ()/(50-68) 115/67  SpO2:  [95 %-97 %] 95 %  I/O last 3 completed shifts:  In: 60 [NG/GT:60]  Out: -     Constitutional: Resting comfortably, alert and interacting with staff, NAD   Respiratory: few coarse BS but otherwise CTAB, no wheeze/rales/rhonchi, no increased work of breathing  Cardiovascular: HRRR, no MGR, no LE edema  GI: S, NT, ND, +BS, +gtube  Skin/Integumen: warm/dry  Other:      Medications     dextrose       sodium chloride 100 mL/hr at 03/25/20 0438       acetylcysteine  2 mL Nebulization 4x Daily     albuterol  2.5 mg Nebulization Q4H While awake     aspirin  81 mg Oral or Feeding Tube Daily     Brivaracetam  100 mg Oral or Feeding Tube BID     carBAMazepine  150 mg Oral or Feeding Tube Q6H     enoxaparin ANTICOAGULANT  40 mg Subcutaneous Q24H     ferrous sulfate  220 mg Per Feeding Tube Daily     fiber modular (NUTRISOURCE FIBER)  1 packet Per Feeding  Tube Daily     hydrocortisone  10 mg Per G Tube Daily with supper     hydrocortisone  20 mg Oral QAM     levothyroxine  150 mcg Oral QAM     metoclopramide  5 mg Per Feeding Tube BID     pantoprazole  40 mg Per J Tube Daily     piperacillin-tazobactam  4.5 g Intravenous Q6H     potassium & sodium phosphates  1 packet Oral TID     Scopolamine HBr  0.8 mg Per G Tube TID     sodium bicarbonate  650 mg Oral Daily     sodium chloride (PF)  3 mL Intracatheter Q8H     vitamin D3  2,000 Units Per Feeding Tube Daily       Data   Recent Labs   Lab 03/25/20  0810 03/24/20  0852 03/23/20 2122   WBC 8.9 11.8* 16.2*   HGB 11.8* 12.0* 13.4   MCV 90 88 87    188 198    137 133   POTASSIUM 3.6 4.3 4.3   CHLORIDE 112* 110* 99   CO2 27 25 28   BUN 12 15 18   CR 0.90 0.84 0.77   ANIONGAP 4 2* 6   STEVE 8.1* 8.1* 8.4*   GLC 78 100* 105*   ALBUMIN  --   --  3.1*   PROTTOTAL  --   --  7.8   BILITOTAL  --   --  0.3   ALKPHOS  --   --  107   ALT  --   --  30   AST  --   --  29       No results found for this or any previous visit (from the past 24 hour(s)).

## 2020-03-25 NOTE — PROGRESS NOTES
Care Coordination    [facility] transport updated that patient had moved to 5th floor room 517-1.  Transport still set for 10:30am, 3/26 by stretcher.         Alee Sousa RN BSN  Inpatient Care Coordination  [facility]

## 2020-03-25 NOTE — PLAN OF CARE
DATE & TIME: 3/25/2020 4089-4914  Cognitive Concerns/ Orientation : HECTOR; patient non-verbal at baseline, follows commands, able to nod yes/no.  BEHAVIOR & AGGRESSION TOOL COLOR: Green, however can be uncooperative during cares.  ABNL VS/O2: VSS on RA.   MOBILITY: Total care, T&R Q2h.   PAIN MANAGMENT: Denies pain.  DIET: NPO: Continuous tube feeding.   BOWEL/BLADDER: Incontinent of bowel and bladder.  ABNL LAB/BG: N/A  DRAIN/DEVICES: L PIV SL. PEG tube.  TELEMETRY RHYTHM: N/A  SKIN: Blanchable redness to coccyx, mepilex CDI. Scattered bruises.  TESTS/PROCEDURES: COVID results negative.  D/C DAY/GOALS/PLACE: Planning to discharge tomorrow AM to home.  OTHER IMPORTANT INFO: Contact precautions maintained. Continues on IV abx.

## 2020-03-26 VITALS
RESPIRATION RATE: 16 BRPM | TEMPERATURE: 97.8 F | BODY MASS INDEX: 22.38 KG/M2 | WEIGHT: 165 LBS | SYSTOLIC BLOOD PRESSURE: 97 MMHG | OXYGEN SATURATION: 96 % | HEART RATE: 62 BPM | DIASTOLIC BLOOD PRESSURE: 57 MMHG

## 2020-03-26 LAB
CREAT SERPL-MCNC: 0.9 MG/DL (ref 0.66–1.25)
GFR SERPL CREATININE-BSD FRML MDRD: >90 ML/MIN/{1.73_M2}
PHOSPHATE SERPL-MCNC: 3.4 MG/DL (ref 2.5–4.5)
PLATELET # BLD AUTO: 194 10E9/L (ref 150–450)

## 2020-03-26 PROCEDURE — 25000125 ZZHC RX 250: Performed by: INTERNAL MEDICINE

## 2020-03-26 PROCEDURE — 36415 COLL VENOUS BLD VENIPUNCTURE: CPT | Performed by: HOSPITALIST

## 2020-03-26 PROCEDURE — 25000132 ZZH RX MED GY IP 250 OP 250 PS 637: Mod: GY | Performed by: INTERNAL MEDICINE

## 2020-03-26 PROCEDURE — 99238 HOSP IP/OBS DSCHRG MGMT 30/<: CPT | Performed by: INTERNAL MEDICINE

## 2020-03-26 PROCEDURE — 84100 ASSAY OF PHOSPHORUS: CPT | Performed by: HOSPITALIST

## 2020-03-26 PROCEDURE — 94640 AIRWAY INHALATION TREATMENT: CPT

## 2020-03-26 PROCEDURE — 25000132 ZZH RX MED GY IP 250 OP 250 PS 637: Mod: GY | Performed by: HOSPITALIST

## 2020-03-26 PROCEDURE — 40000275 ZZH STATISTIC RCP TIME EA 10 MIN

## 2020-03-26 PROCEDURE — 25000125 ZZHC RX 250: Performed by: HOSPITALIST

## 2020-03-26 PROCEDURE — 85049 AUTOMATED PLATELET COUNT: CPT | Performed by: HOSPITALIST

## 2020-03-26 PROCEDURE — 27210429 ZZH NUTRITION PRODUCT INTERMEDIATE LITER

## 2020-03-26 PROCEDURE — 25000128 H RX IP 250 OP 636: Performed by: HOSPITALIST

## 2020-03-26 PROCEDURE — 82565 ASSAY OF CREATININE: CPT | Performed by: HOSPITALIST

## 2020-03-26 RX ORDER — AMOXICILLIN AND CLAVULANATE POTASSIUM 400; 57 MG/5ML; MG/5ML
875 POWDER, FOR SUSPENSION ORAL 2 TIMES DAILY
Qty: 87.2 ML | Refills: 0 | Status: ON HOLD | OUTPATIENT
Start: 2020-03-26 | End: 2020-05-05

## 2020-03-26 RX ADMIN — MINERAL SUPPLEMENT IRON 300 MG / 5 ML STRENGTH LIQUID 100 PER BOX UNFLAVORED 220 MG: at 09:06

## 2020-03-26 RX ADMIN — POTASSIUM & SODIUM PHOSPHATES POWDER PACK 280-160-250 MG 1 PACKET: 280-160-250 PACK at 08:59

## 2020-03-26 RX ADMIN — SODIUM BICARBONATE 650 MG TABLET 650 MG: at 08:58

## 2020-03-26 RX ADMIN — ASPIRIN 81 MG 81 MG: 81 TABLET ORAL at 09:00

## 2020-03-26 RX ADMIN — IPRATROPIUM BROMIDE AND ALBUTEROL SULFATE 3 ML: .5; 3 SOLUTION RESPIRATORY (INHALATION) at 07:21

## 2020-03-26 RX ADMIN — PIPERACILLIN AND TAZOBACTAM 4.5 G: 4; .5 INJECTION, POWDER, FOR SOLUTION INTRAVENOUS at 05:49

## 2020-03-26 RX ADMIN — Medication 0.8 MG: at 09:06

## 2020-03-26 RX ADMIN — CARBAMAZEPINE 150 MG: 100 SUSPENSION ORAL at 00:36

## 2020-03-26 RX ADMIN — CARBAMAZEPINE 150 MG: 100 SUSPENSION ORAL at 05:50

## 2020-03-26 RX ADMIN — Medication 40 MG: at 08:57

## 2020-03-26 RX ADMIN — ENOXAPARIN SODIUM 40 MG: 40 INJECTION SUBCUTANEOUS at 00:48

## 2020-03-26 RX ADMIN — CHOLECALCIFEROL TAB 50 MCG (2000 UNIT) 2000 UNITS: 50 TAB at 08:59

## 2020-03-26 RX ADMIN — ACETYLCYSTEINE 2 ML: 200 SOLUTION ORAL; RESPIRATORY (INHALATION) at 07:21

## 2020-03-26 RX ADMIN — HYDROCORTISONE 20 MG: 20 TABLET ORAL at 08:59

## 2020-03-26 RX ADMIN — BRIVARACETAM 100 MG: 10 SOLUTION ORAL at 09:26

## 2020-03-26 RX ADMIN — LEVOTHYROXINE SODIUM 150 MCG: 75 TABLET ORAL at 08:58

## 2020-03-26 RX ADMIN — METOCLOPRAMIDE 5 MG: 5 SOLUTION ORAL at 08:58

## 2020-03-26 ASSESSMENT — ACTIVITIES OF DAILY LIVING (ADL)
ADLS_ACUITY_SCORE: 42

## 2020-03-26 NOTE — PLAN OF CARE
HECTOR orientation, pt nonverbal. Nods head yes/no, uses thumbs up/down. Denies pain. NPO,GJ tube clamped . Up with lift. T/R q2h/prn. Incontinent. Mepilex on coccyx for nonblanchable erythema.  LS diminished in bases and JOSE MARIA, clear in RUL.  VSS on room air. Contact iso maintained. Plan to discharge home with Mom and HHC today at 1030 via [facility] stretcher.

## 2020-03-26 NOTE — PLAN OF CARE
Pt. A&o, non-verbal, vss, bed rest, denied any pain, incontinent of urine and changed before discharge to home, discharge medication and discharge instruction with pt.'s belongings, and pt. discharge to home via straight chair at 1045.

## 2020-03-26 NOTE — DISCHARGE SUMMARY
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     Hospital Course   Keyon Farias was admitted on 3/23/2020.  The following problems were addressed during his hospitalization:    Sepsis due to aspiration pneumonia vs pneumonitis  Recurrent aspiration  Has had several hospitalizations this calendar year for respiratory issues dt recurrent aspiration (including pneumonia, pneumonitis). Despite TFs and appropriate precautions he continues to aspirate frequently. On prior evaluations per ID service, favored aspiration pneumonitis as opposed to aspiration pneumonia. On presentation this stay, was febrile with T 102, tachycardic (HR 110s) and mildly hypotensive (BPs 100s systolic). WBC 16.2. Lactate 2.9. Procalcitonin neg. Rapid influenza swab was neg. COVID19 PCR was neg. CXR showed worsening interstitial infiltrates in the lower lungs bilaterally and possible developing alveolar infiltrates vs consolidation in the RLL. Zosyn and IVFs were started on admission. Clinical condition rapidly improved. Fever resolved, WBC and lactate normalized. O2 sats remained stable on RA. Continued on Zosyn, scheduled neb treatments during stay. Discharged with plans to complete an additional 4 days of treatment with Augmentin.      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 Has feeding tube.  Continued on home seizure meds without change. Continued on TFs this stay  Home care services resumed at discharge as per prior to admission.     Panhypopituitarism  Chronic and stable. Maintained on hydrocortisone, levothyroxine and testosterone.   Given one dose of IV solumedrol on admission. Remained hemodynamically stable so did not require ongoing stress-dosed steroids.   Home meds continued unchanged this stay.    Sun Keita DO    Pending Results   These results will be followed up by PCP  Unresulted Labs Ordered in the Past 30 Days of this Admission     Date and Time Order Name Status Description    3/23/2020 2114 Blood culture Preliminary     3/23/2020 2114 Blood culture Preliminary           Code Status   Full Code       Primary Care Physician   Carlos Gomez    Physical Exam   Temp: 97.5  F (36.4  C) Temp src: Axillary BP: 120/56   Heart Rate: 54 Resp: 16 SpO2: 94 % O2 Device: None (Room air)    Vitals:    03/26/20 0550   Weight: 74.8 kg (165 lb)     Vital Signs with Ranges  Temp:  [97.5  F (36.4  C)-98  F (36.7  C)] 97.5  F (36.4  C)  Heart Rate:  [54-67] 54  Resp:  [16] 16  BP: (108-120)/(56-57) 120/56  SpO2:  [92 %-94 %] 94 %  I/O last 3 completed shifts:  In: 3043 [I.V.:2740; NG/GT:303]  Out: -     General: Resting comfortably, alert, conversive, NAD  CVS: HRRR, no MGR, no LE edema  Respiratory: CTAB, no wheeze/rales/rhonchi, no increased work of breathing  GI: S, NT, ND, +BS, +gtube  Skin: Warm/dry    Discharge Disposition   Discharged to home  Condition at discharge: Stable    Consultations This Hospital Stay   [facility] ADULT IP CONSULT    Time Spent on this Encounter   Sun DYKES DO, personally saw the patient today and spent less than or equal to 30 minutes discharging this patient.    Discharge Orders      Home Care SLP Referral for Hospital Discharge      Home Care OT Referral for Hospital Discharge       Home Care PT Referral for Hospital Discharge      Discharge Instructions    Resume Home Care  Your doctor has ordered home care to help you after your hospital stay.  They will contact you regarding your 1st visit.  The service will be provided by [facility].  If you have not received a call within 48hrs of discharge, please call them at [phone]     Reason for your hospital stay    Management of your fever, which was felt to be due to recurrent aspiration pneumonia. You were tested for COVID19 this stay and were negative.     Follow-up and recommended labs and tests     1. Follow up with your PCP in the next week     Activity    Your activity upon discharge: activity as tolerated     Diet    Follow this diet upon discharge: NPO, resume TFs per home orders     Discharge Medications   Current Discharge Medication List      CONTINUE these medications which have CHANGED    Details   amoxicillin-clavulanate (AUGMENTIN) 400-57 MG/5ML suspension 10.9 mLs (875 mg) by Per G Tube route 2 times daily for 4 days  Qty: 87.2 mL, Refills: 0    Associated Diagnoses: Aspiration pneumonia, unspecified aspiration pneumonia type, unspecified laterality, unspecified part of lung (H)         CONTINUE these medications which have NOT CHANGED    Details   miconazole (MICATIN) 2 % AERP powder Apply topically 2 times daily as needed       mupirocin (BACTROBAN) 2 % external ointment Apply topically 2 times daily as needed       Skin Protectants, Misc. (BALMEX SKIN PROTECTANT) OINT Externally apply topically 2 times daily as needed (irritation) Applay to reddened memo areas twice daily as needed      testosterone cypionate (DEPOTESTOTERONE CYPIONATE) 200 MG/ML injection Inject 76 mg into the muscle See Admin Instructions Every 2 weeks on Thursdays  76 mg or 0.38 mL      acetylcysteine (MUCOMYST) 20 % neb solution Take 2 mLs by nebulization 4 times daily With albuterol at 0700, 1100, 1500, and 1900       albuterol (PROVENTIL)  (5 MG/ML) 0.5% neb solution Take 2.5 mg by nebulization every 4 hours (while awake) 0700 1100 1500 1900 with mucomyst       aspirin (ASA) 81 MG chewable tablet 81 mg by Oral or Feeding Tube route daily At 0900      bacitracin ointment Apply topically daily as needed for wound care To PEG site.       Bioflavonoid Products (VITAMIN C PLUS) 1000 MG TABS 1 tablet by Oral or FT or NG tube route      Brivaracetam (BRIVIACT) 10 MG/ML solution 100 mg by Oral or Feeding Tube route 2 times daily 0900, 2100      calcium carbonate 1250 (500 CA) MG/5ML SUSP suspension 5 mLs (1,250 mg) by Per J Tube route 3 times daily (with meals)  Qty: 450 mL    Associated Diagnoses: Malnutrition (H)      carBAMazepine (TEGRETOL) 100 MG/5ML suspension 150 mg by Oral or Feeding Tube route every 6 hours At 06:00, 12:00, 18:00 and 24:00 for seizures       ferrous sulfate 220 (44 Fe) MG/5ML ELIX 220 mg by Per Feeding Tube route daily      Guar Gum (FIBER MODULAR, NUTRISOURCE FIBER,) packet 1 packet by Per G Tube route daily  Qty: 30 packet, Refills: 0    Associated Diagnoses: Pneumonia of both lower lobes due to infectious organism (H)      !! hydrocortisone (CORTEF) 5 MG tablet 10 mg by Oral or FT or NG tube route daily (with dinner) At 1500      !! hydrocortisone (CORTEF) 5 MG tablet 20 mg by Oral or FT or NG tube route every morning       hydrocortisone 1 % CREA cream Place rectally 2 times daily as needed for other Apply to reddened memo areas as needed      levothyroxine (SYNTHROID/LEVOTHROID) 150 MCG tablet Take 150 mcg by mouth every morning      melatonin (MELATONIN) 1 MG/ML LIQD liquid 6 mg by Per NG tube route At Bedtime       metoclopramide (REGLAN) 5 MG/5ML solution 5 mg by Per Feeding Tube route 2 times daily      order for DME Equipment being ordered: Nebulizer  Qty: 1 Units, Refills: 0    Associated Diagnoses: Pneumonia of both lower lobes due to infectious organism (H)      pantoprazole (PROTONIX) 2 mg/mL SUSP suspension 20 mLs (40  mg) by Per J Tube route daily  Qty: 400 mL, Refills: 1    Associated Diagnoses: Gastroesophageal reflux disease, esophagitis presence not specified      potassium & sodium phosphates (NEUTRA-PHOS) 280-160-250 MG Packet Take 1 packet by mouth 3 times daily       Scopolamine HBr POWD Dispense #90. Mix contents with small amount of water for admin via J-tube.  Administer 0.8 mg three times each day.  Qty: 90 Bottle, Refills: 11    Associated Diagnoses: Aspiration pneumonia (H)      sodium bicarbonate 650 MG tablet Take 1 tablet (650 mg) by mouth daily  Qty: 30 tablet, Refills: 0    Associated Diagnoses: Hyponatremia      vitamin D3 (CHOLECALCIFEROL) 2000 units (50 mcg) tablet Take 2,000 Units by mouth daily Crush and feed via j-tube @@ 0900       !! - Potential duplicate medications found. Please discuss with provider.        Allergies   Allergies   Allergen Reactions     Dilantin [Phenytoin Sodium]      Valproic Acid      Toxicity c bone marrow suspension, elevated ammonia levels      Data   Most Recent 3 CBC's:  Recent Labs   Lab Test 03/26/20 0619 03/25/20  0810 03/24/20  0852 03/23/20 2122   WBC  --  8.9 11.8* 16.2*   HGB  --  11.8* 12.0* 13.4   MCV  --  90 88 87    174 188 198      Most Recent 3 BMP's:  Recent Labs   Lab Test 03/26/20 0619 03/25/20  0810 03/24/20  0852 03/23/20 2122   NA  --  143 137 133   POTASSIUM  --  3.6 4.3 4.3   CHLORIDE  --  112* 110* 99   CO2  --  27 25 28   BUN  --  12 15 18   CR 0.90 0.90 0.84 0.77   ANIONGAP  --  4 2* 6   STEVE  --  8.1* 8.1* 8.4*   GLC  --  78 100* 105*     Most Recent 2 LFT's:  Recent Labs   Lab Test 03/23/20 2122 02/18/20  0009   AST 29 23   ALT 30 41   ALKPHOS 107 95   BILITOTAL 0.3 0.3     Most Recent 6 Bacteria Isolates From Any Culture (See EPIC Reports for Culture Details):  Lab Test 03/23/20 2122   CULT No growth after 2 days  No growth after 2 days     Results for orders placed or performed during the hospital encounter of 03/23/20   XR Chest Port  1 View    Narrative    EXAM: XR CHEST PORT 1 VW  LOCATION: [facility]  DATE/TIME: 3/23/2020 9:43 PM    INDICATION: Fever  COMPARISON: 02/17/2020      Impression    IMPRESSION: Hazy underlying interstitial infiltrates involving in the lower lungs bilaterally have increased since prior examination. Possibly developing alveolar infiltrate or consolidation right lower lung suspicious for associated superimposed   alveolar pneumonia on interstitial pneumonitis. Normal heart size. Normal pulmonary vascularity. Tortuous aorta. Degenerative changes in the spine and shoulders.

## 2020-04-01 LAB
COVID-19 VIRUS PCR RESULT FROM MDH: NEGATIVE
LAB SCANNED RESULT: NORMAL

## 2020-04-28 NOTE — IP AVS SNAPSHOT
MRN:[id number]                      After Visit Summary   9/1/2018    Keyon Farias    MRN: [id number]           Thank you!     Thank you for choosing [facility] for your care. Our goal is always to provide you with excellent care. Hearing back from our patients is one way we can continue to improve our services. Please take a few minutes to complete the written survey that you may receive in the mail after you visit with us. Thank you!        Patient Information     Date Of Birth          1960        Designated Caregiver       Most Recent Value    Caregiver    Will someone help with your care after discharge? yes    Name of designated caregiver Savannah (guardian)    Phone number of caregiver     Caregiver address Same as pt      About your hospital stay     You were admitted on:  September 2, 2018 You last received care in the:  [place] [address] [facility]    You were discharged on:  September 4, 2018        Reason for your hospital stay       You were admitted with aspiration pneumonitis                  Who to Call     For medical emergencies, please call 911.  For non-urgent questions about your medical care, please call your primary care provider or clinic, [phone]          Attending Provider     Provider Specialty    Levi Castle MD Emergency Medicine    Villavicencio, Jerald Cruz MD Internal Medicine       Primary Care Provider Office Phone # Fax #    Carlos Gomez [phone]      After Care Instructions     Activity       Your activity upon discharge: activity as tolerated            Diet       Follow this diet upon discharge: Orders Placed This Encounter      Adult Formula Drip Feeding: Continuous Isosource 1.5; Gastrostomy; Goal Rate: 100/hr; mL/hr; From: 7:00 AM; 7:00 PM; Medication - Tube Feeding Flush Frequency: At least 15-30 mL water before and after medication administration and with tube cloggi...      NPO for Medical/Clinical  "Reasons Except for: No Exceptions                  Follow-up Appointments     Follow-up and recommended labs and tests        Follow up with primary care provider, Carlos Gomez, as needed.                  Pending Results     Date and Time Order Name Status Description    2018 0114 Blood culture Preliminary     2018 2334 Blood culture Preliminary             Statement of Approval     Ordered          18 1113  I have reviewed and agree with all the recommendations and orders detailed in this document.  EFFECTIVE NOW     Approved and electronically signed by:  Manish Motta MD             Admission Information     Date & Time Provider Department Dept. Phone    2018 Villavicencio, Jerald Cruz MD [place] [address] [facility] [phone]      Your Vitals Were     Blood Pressure Pulse Temperature Respirations Weight Pulse Oximetry    97/56 (BP Location: Left arm) 81 97.4  F (36.3  C) (Axillary) 16 72.3 kg (159 lb 6.3 oz) 97%    BMI (Body Mass Index)                   21.62 kg/m2           MyChart Information     [employer] lets you send messages to your doctor, view your test results, renew your prescriptions, schedule appointments and more. To sign up, go to [email or web address] . Click on \"Log in\" on the left side of the screen, which will take you to the Welcome page. Then click on \"Sign up Now\" on the right side of the page.     You will be asked to enter the access code listed below, as well as some personal information. Please follow the directions to create your username and password.     Your access code is: [email or web address]  Expires: 10/6/2018  9:33 AM     Your access code will  in 90 days. If you need help or a new code, please call your [facility] clinic or [phone].        Care EveryWhere ID     This is your Care EveryWhere ID. This could be used by other organizations to access your [facility] medical records  [id number]        Equal Access to Services     BRENDA LAO AH: " Hadii aad ku hadasho Soomaali, waaxda luqadaha, qaybta kaalmada adetrudy, yaw prasadin hayaan marlene rodriguez labelloreji ben. So [facility] [phone].    ATENCIÓN: Si lindsay queen, tiene a king disposición servicios gratuitos de asistencia lingüística. Llame al [phone].    We comply with applicable federal civil rights laws and [place] laws. We do not discriminate on the basis of race, color, national origin, age, disability, sex, sexual orientation, or gender identity.               Review of your medicines      CONTINUE these medicines which have NOT CHANGED        Dose / Directions    ACETAMINOPHEN PO        Dose:  650 mg   650 mg by Per Feeding Tube route every 4 hours as needed for pain   Refills:  0       albuterol (2.5 MG/3ML) 0.083% neb solution        Dose:  1 vial   Take 1 vial by nebulization every 4 hours as needed for shortness of breath / dyspnea or wheezing   Refills:  0       bacitracin ointment        Apply topically daily as needed for wound care To PEG site.   Refills:  0       BENEFIBER PO        Dose:  2 teaspoonful   Take 2 teaspoonful by mouth daily   Refills:  0       BRIVIACT 10 MG/ML solution   Generic drug:  Brivaracetam        Dose:  100 mg   Take 100 mg by mouth 2 times daily   Refills:  0       calcium carbonate 1250 (500 Ca) MG/5ML Susp suspension   Used for:  Malnutrition (H)        Dose:  1250 mg   5 mLs (1,250 mg) by Per J Tube route 3 times daily (with meals)   Quantity:  450 mL   Refills:  0       carBAMazepine 100 MG/5ML suspension   Commonly known as:  TEGretol        Dose:  150 mg   Take 150 mg by mouth every 6 hours   Refills:  0       CENTRUM SILVER per tablet        Dose:  1 tablet   Take 1 tablet by mouth daily Crush and feed via j-tube   Refills:  0       * hydrocortisone 2 mg/mL Susp   Commonly known as:  CORTEF        Dose:  15 mg   Take 15 mg by mouth every morning   Refills:  0       * hydrocortisone 2 mg/mL Susp   Commonly known as:  CORTEF   Used for:  Panhypopituitarism  (H)        Dose:  10 mg   Take 5 mLs (10 mg) by mouth every evening   Refills:  0       levothyroxine 25 mcg/mL Susp   Commonly known as:  SYNTHROID   Used for:  Panhypopituitarism (H)        Dose:  125 mcg   5 mLs (125 mcg) by Per J Tube route every morning (before breakfast)   Refills:  0       melatonin 1 MG/ML Liqd liquid        Dose:  6 mg   6 mg by Jejunal Tube route nightly as needed for sleep   Refills:  0       pantoprazole 2 mg/mL Susp suspension   Commonly known as:  PROTONIX        Dose:  20 mg   Take 20 mg by mouth every morning   Refills:  0       potassium & sodium phosphates 280-160-250 MG Packet   Commonly known as:  NEUTRA-PHOS        Dose:  1 packet   Take 1 packet by mouth 3 times daily 0900, 1500, 2100.   Refills:  0       testosterone cypionate 200 MG/ML injection   Commonly known as:  DEPOTESTOTERONE        Dose:  100 mg   Inject 100 mg into the muscle See Admin Instructions Every 2 weeks.   Refills:  0       VITAMIN D (CHOLECALCIFEROL) PO        Dose:  2000 Units   Take 2,000 Units by mouth daily   Refills:  0       * Notice:  This list has 2 medication(s) that are the same as other medications prescribed for you. Read the directions carefully, and ask your doctor or other care provider to review them with you.      STOP taking     aspirin 10 mg/mL Susp                    Protect others around you: Learn how to safely use, store and throw away your medicines at www.disposemymeds.org.             Medication List: This is a list of all your medications and when to take them. Check marks below indicate your daily home schedule. Keep this list as a reference.      Medications           Morning Afternoon Evening Bedtime As Needed    ACETAMINOPHEN PO   650 mg by Per Feeding Tube route every 4 hours as needed for pain   Last time this was given:  975 mg on 9/1/2018 11:25 PM                                   albuterol (2.5 MG/3ML) 0.083% neb solution   Take 1 vial by nebulization every 4 hours as  associated pain... needed for shortness of breath / dyspnea or wheezing                                   bacitracin ointment   Apply topically daily as needed for wound care To PEG site.                                   BENEFIBER PO   Take 2 teaspoonful by mouth daily                                BRIVIACT 10 MG/ML solution   Take 100 mg by mouth 2 times daily   Last time this was given:  100 mg on 9/4/2018  8:09 AM   Generic drug:  Brivaracetam                                   calcium carbonate 1250 (500 Ca) MG/5ML Susp suspension   5 mLs (1,250 mg) by Per J Tube route 3 times daily (with meals)                                   carBAMazepine 100 MG/5ML suspension   Commonly known as:  TEGretol   Take 150 mg by mouth every 6 hours   Last time this was given:  150 mg on 9/4/2018  5:34 AM                                   CENTRUM SILVER per tablet   Take 1 tablet by mouth daily Crush and feed via j-tube                                * hydrocortisone 2 mg/mL Susp   Commonly known as:  CORTEF   Take 15 mg by mouth every morning                                   * hydrocortisone 2 mg/mL Susp   Commonly known as:  CORTEF   Take 5 mLs (10 mg) by mouth every evening                                   levothyroxine 25 mcg/mL Susp   Commonly known as:  SYNTHROID   5 mLs (125 mcg) by Per J Tube route every morning (before breakfast)                                   melatonin 1 MG/ML Liqd liquid   6 mg by Jejunal Tube route nightly as needed for sleep                                   pantoprazole 2 mg/mL Susp suspension   Commonly known as:  PROTONIX   Take 20 mg by mouth every morning   Last time this was given:  10 mg on 9/4/2018  7:01 AM                                   potassium & sodium phosphates 280-160-250 MG Packet   Commonly known as:  NEUTRA-PHOS   Take 1 packet by mouth 3 times daily 0900, 1500, 2100.   Last time this was given:  1 packet on 9/4/2018  8:08 AM                                   testosterone cypionate 200 MG/ML  injection   Commonly known as:  DEPOTESTOTERONE   Inject 100 mg into the muscle See Admin Instructions Every 2 weeks.                                VITAMIN D (CHOLECALCIFEROL) PO   Take 2,000 Units by mouth daily                                   * Notice:  This list has 2 medication(s) that are the same as other medications prescribed for you. Read the directions carefully, and ask your doctor or other care provider to review them with you.

## 2020-05-04 ENCOUNTER — HOSPITAL ENCOUNTER (INPATIENT)
Facility: CLINIC | Age: 60
LOS: 2 days | Discharge: HOME HEALTH | DRG: 871 | End: 2020-05-06
Attending: EMERGENCY MEDICINE | Admitting: HOSPITALIST
Payer: MEDICARE

## 2020-05-04 ENCOUNTER — APPOINTMENT (OUTPATIENT)
Dept: GENERAL RADIOLOGY | Facility: CLINIC | Age: 60
DRG: 871 | End: 2020-05-04
Attending: EMERGENCY MEDICINE
Payer: MEDICARE

## 2020-05-04 DIAGNOSIS — R50.9 ACUTE FEBRILE ILLNESS: ICD-10-CM

## 2020-05-04 DIAGNOSIS — Z87.898 HISTORY OF AIRWAY ASPIRATION: ICD-10-CM

## 2020-05-04 DIAGNOSIS — A41.9 SEPSIS, DUE TO UNSPECIFIED ORGANISM, UNSPECIFIED WHETHER ACUTE ORGAN DYSFUNCTION PRESENT (H): ICD-10-CM

## 2020-05-04 LAB
ALBUMIN SERPL-MCNC: 3.2 G/DL (ref 3.4–5)
ALBUMIN UR-MCNC: 10 MG/DL
ALP SERPL-CCNC: 107 U/L (ref 40–150)
ALT SERPL W P-5'-P-CCNC: 30 U/L (ref 0–70)
ANION GAP SERPL CALCULATED.3IONS-SCNC: 7 MMOL/L (ref 3–14)
APPEARANCE UR: CLEAR
AST SERPL W P-5'-P-CCNC: 18 U/L (ref 0–45)
BASOPHILS # BLD AUTO: 0.1 10E9/L (ref 0–0.2)
BASOPHILS NFR BLD AUTO: 0.3 %
BILIRUB SERPL-MCNC: 0.3 MG/DL (ref 0.2–1.3)
BILIRUB UR QL STRIP: NEGATIVE
BUN SERPL-MCNC: 18 MG/DL (ref 7–30)
CALCIUM SERPL-MCNC: 8.5 MG/DL (ref 8.5–10.1)
CHLORIDE SERPL-SCNC: 97 MMOL/L (ref 94–109)
CO2 SERPL-SCNC: 28 MMOL/L (ref 20–32)
COLOR UR AUTO: YELLOW
CREAT SERPL-MCNC: 0.78 MG/DL (ref 0.66–1.25)
DIFFERENTIAL METHOD BLD: ABNORMAL
EOSINOPHIL # BLD AUTO: 0.2 10E9/L (ref 0–0.7)
EOSINOPHIL NFR BLD AUTO: 1.2 %
ERYTHROCYTE [DISTWIDTH] IN BLOOD BY AUTOMATED COUNT: 13.7 % (ref 10–15)
GFR SERPL CREATININE-BSD FRML MDRD: >90 ML/MIN/{1.73_M2}
GLUCOSE SERPL-MCNC: 116 MG/DL (ref 70–99)
GLUCOSE UR STRIP-MCNC: 70 MG/DL
HCT VFR BLD AUTO: 39.5 % (ref 40–53)
HGB BLD-MCNC: 13 G/DL (ref 13.3–17.7)
HGB UR QL STRIP: NEGATIVE
IMM GRANULOCYTES # BLD: 0 10E9/L (ref 0–0.4)
IMM GRANULOCYTES NFR BLD: 0.2 %
KETONES UR STRIP-MCNC: NEGATIVE MG/DL
LACTATE BLD-SCNC: 2.2 MMOL/L (ref 0.7–2)
LEUKOCYTE ESTERASE UR QL STRIP: NEGATIVE
LYMPHOCYTES # BLD AUTO: 1.2 10E9/L (ref 0.8–5.3)
LYMPHOCYTES NFR BLD AUTO: 8.6 %
MCH RBC QN AUTO: 28.3 PG (ref 26.5–33)
MCHC RBC AUTO-ENTMCNC: 32.9 G/DL (ref 31.5–36.5)
MCV RBC AUTO: 86 FL (ref 78–100)
MONOCYTES # BLD AUTO: 1 10E9/L (ref 0–1.3)
MONOCYTES NFR BLD AUTO: 6.8 %
MUCOUS THREADS #/AREA URNS LPF: PRESENT /LPF
NEUTROPHILS # BLD AUTO: 11.9 10E9/L (ref 1.6–8.3)
NEUTROPHILS NFR BLD AUTO: 82.9 %
NITRATE UR QL: NEGATIVE
NRBC # BLD AUTO: 0 10*3/UL
NRBC BLD AUTO-RTO: 0 /100
PH UR STRIP: 7 PH (ref 5–7)
PLATELET # BLD AUTO: 178 10E9/L (ref 150–450)
POTASSIUM SERPL-SCNC: 4.2 MMOL/L (ref 3.4–5.3)
PROCALCITONIN SERPL-MCNC: <0.05 NG/ML
PROT SERPL-MCNC: 7.6 G/DL (ref 6.8–8.8)
RBC # BLD AUTO: 4.59 10E12/L (ref 4.4–5.9)
RBC #/AREA URNS AUTO: 1 /HPF (ref 0–2)
SODIUM SERPL-SCNC: 132 MMOL/L (ref 133–144)
SOURCE: ABNORMAL
SP GR UR STRIP: 1.02 (ref 1–1.03)
SQUAMOUS #/AREA URNS AUTO: <1 /HPF (ref 0–1)
UROBILINOGEN UR STRIP-MCNC: 4 MG/DL (ref 0–2)
WBC # BLD AUTO: 14.4 10E9/L (ref 4–11)
WBC #/AREA URNS AUTO: 1 /HPF (ref 0–5)

## 2020-05-04 PROCEDURE — 83605 ASSAY OF LACTIC ACID: CPT | Performed by: EMERGENCY MEDICINE

## 2020-05-04 PROCEDURE — 71045 X-RAY EXAM CHEST 1 VIEW: CPT

## 2020-05-04 PROCEDURE — 12000000 ZZH R&B MED SURG/OB

## 2020-05-04 PROCEDURE — 36415 COLL VENOUS BLD VENIPUNCTURE: CPT

## 2020-05-04 PROCEDURE — 81001 URINALYSIS AUTO W/SCOPE: CPT | Performed by: EMERGENCY MEDICINE

## 2020-05-04 PROCEDURE — 96365 THER/PROPH/DIAG IV INF INIT: CPT

## 2020-05-04 PROCEDURE — 25000128 H RX IP 250 OP 636: Performed by: EMERGENCY MEDICINE

## 2020-05-04 PROCEDURE — 87635 SARS-COV-2 COVID-19 AMP PRB: CPT | Performed by: EMERGENCY MEDICINE

## 2020-05-04 PROCEDURE — 85025 COMPLETE CBC W/AUTO DIFF WBC: CPT | Performed by: EMERGENCY MEDICINE

## 2020-05-04 PROCEDURE — 80053 COMPREHEN METABOLIC PANEL: CPT | Performed by: EMERGENCY MEDICINE

## 2020-05-04 PROCEDURE — 87040 BLOOD CULTURE FOR BACTERIA: CPT | Performed by: EMERGENCY MEDICINE

## 2020-05-04 PROCEDURE — 99285 EMERGENCY DEPT VISIT HI MDM: CPT | Mod: 25

## 2020-05-04 PROCEDURE — 25800030 ZZH RX IP 258 OP 636: Performed by: EMERGENCY MEDICINE

## 2020-05-04 PROCEDURE — 84145 PROCALCITONIN (PCT): CPT | Performed by: EMERGENCY MEDICINE

## 2020-05-04 PROCEDURE — 96361 HYDRATE IV INFUSION ADD-ON: CPT

## 2020-05-04 PROCEDURE — 99223 1ST HOSP IP/OBS HIGH 75: CPT | Mod: AI | Performed by: HOSPITALIST

## 2020-05-04 PROCEDURE — 96366 THER/PROPH/DIAG IV INF ADDON: CPT

## 2020-05-04 RX ORDER — PIPERACILLIN SODIUM, TAZOBACTAM SODIUM 3; .375 G/15ML; G/15ML
3.38 INJECTION, POWDER, LYOPHILIZED, FOR SOLUTION INTRAVENOUS ONCE
Status: COMPLETED | OUTPATIENT
Start: 2020-05-04 | End: 2020-05-04

## 2020-05-04 RX ADMIN — SODIUM CHLORIDE 1000 ML: 9 INJECTION, SOLUTION INTRAVENOUS at 22:56

## 2020-05-04 RX ADMIN — PIPERACILLIN SODIUM AND TAZOBACTAM SODIUM 3.38 G: 3; .375 INJECTION, POWDER, LYOPHILIZED, FOR SOLUTION INTRAVENOUS at 23:03

## 2020-05-04 ASSESSMENT — MIFFLIN-ST. JEOR: SCORE: 1611.44

## 2020-05-05 LAB
SARS-COV-2 PCR COMMENT: NORMAL
SARS-COV-2 RNA SPEC QL NAA+PROBE: NEGATIVE
SARS-COV-2 RNA SPEC QL NAA+PROBE: NORMAL
SPECIMEN SOURCE: NORMAL
SPECIMEN SOURCE: NORMAL

## 2020-05-05 PROCEDURE — 27210429 ZZH NUTRITION PRODUCT INTERMEDIATE LITER

## 2020-05-05 PROCEDURE — 25000132 ZZH RX MED GY IP 250 OP 250 PS 637: Mod: GY | Performed by: STUDENT IN AN ORGANIZED HEALTH CARE EDUCATION/TRAINING PROGRAM

## 2020-05-05 PROCEDURE — 12000000 ZZH R&B MED SURG/OB

## 2020-05-05 PROCEDURE — 25000128 H RX IP 250 OP 636: Performed by: HOSPITALIST

## 2020-05-05 PROCEDURE — 99232 SBSQ HOSP IP/OBS MODERATE 35: CPT | Performed by: STUDENT IN AN ORGANIZED HEALTH CARE EDUCATION/TRAINING PROGRAM

## 2020-05-05 RX ORDER — LANOLIN ALCOHOL/MO/W.PET/CERES
3 CREAM (GRAM) TOPICAL
Status: DISCONTINUED | OUTPATIENT
Start: 2020-05-05 | End: 2020-05-06 | Stop reason: HOSPADM

## 2020-05-05 RX ORDER — SODIUM BICARBONATE 650 MG/1
650 TABLET ORAL DAILY
Status: DISCONTINUED | OUTPATIENT
Start: 2020-05-05 | End: 2020-05-06 | Stop reason: HOSPADM

## 2020-05-05 RX ORDER — NICOTINE POLACRILEX 4 MG
15-30 LOZENGE BUCCAL
Status: DISCONTINUED | OUTPATIENT
Start: 2020-05-05 | End: 2020-05-06 | Stop reason: HOSPADM

## 2020-05-05 RX ORDER — ONDANSETRON 4 MG/1
4 TABLET, ORALLY DISINTEGRATING ORAL EVERY 6 HOURS PRN
Status: DISCONTINUED | OUTPATIENT
Start: 2020-05-05 | End: 2020-05-06 | Stop reason: HOSPADM

## 2020-05-05 RX ORDER — ONDANSETRON 2 MG/ML
4 INJECTION INTRAMUSCULAR; INTRAVENOUS EVERY 6 HOURS PRN
Status: DISCONTINUED | OUTPATIENT
Start: 2020-05-05 | End: 2020-05-06 | Stop reason: HOSPADM

## 2020-05-05 RX ORDER — ASPIRIN 81 MG/1
81 TABLET, CHEWABLE ORAL DAILY
Status: DISCONTINUED | OUTPATIENT
Start: 2020-05-05 | End: 2020-05-06 | Stop reason: HOSPADM

## 2020-05-05 RX ORDER — DEXTROSE MONOHYDRATE 25 G/50ML
25-50 INJECTION, SOLUTION INTRAVENOUS
Status: DISCONTINUED | OUTPATIENT
Start: 2020-05-05 | End: 2020-05-06 | Stop reason: HOSPADM

## 2020-05-05 RX ORDER — CARBAMAZEPINE 100 MG/5ML
150 SUSPENSION ORAL EVERY 6 HOURS
Status: DISCONTINUED | OUTPATIENT
Start: 2020-05-05 | End: 2020-05-06 | Stop reason: HOSPADM

## 2020-05-05 RX ORDER — ACETAMINOPHEN 650 MG/1
650 SUPPOSITORY RECTAL EVERY 4 HOURS PRN
Status: DISCONTINUED | OUTPATIENT
Start: 2020-05-05 | End: 2020-05-06 | Stop reason: HOSPADM

## 2020-05-05 RX ORDER — LEVOTHYROXINE SODIUM 150 UG/1
150 TABLET ORAL EVERY MORNING
Status: DISCONTINUED | OUTPATIENT
Start: 2020-05-05 | End: 2020-05-06 | Stop reason: HOSPADM

## 2020-05-05 RX ORDER — PIPERACILLIN SODIUM, TAZOBACTAM SODIUM 4; .5 G/20ML; G/20ML
4.5 INJECTION, POWDER, LYOPHILIZED, FOR SOLUTION INTRAVENOUS EVERY 6 HOURS
Status: DISCONTINUED | OUTPATIENT
Start: 2020-05-05 | End: 2020-05-06 | Stop reason: HOSPADM

## 2020-05-05 RX ORDER — BISACODYL 10 MG
10 SUPPOSITORY, RECTAL RECTAL DAILY PRN
Status: DISCONTINUED | OUTPATIENT
Start: 2020-05-05 | End: 2020-05-06 | Stop reason: HOSPADM

## 2020-05-05 RX ORDER — AMOXICILLIN 250 MG
2 CAPSULE ORAL 2 TIMES DAILY PRN
Status: DISCONTINUED | OUTPATIENT
Start: 2020-05-05 | End: 2020-05-06 | Stop reason: HOSPADM

## 2020-05-05 RX ORDER — AMOXICILLIN 250 MG
1 CAPSULE ORAL 2 TIMES DAILY PRN
Status: DISCONTINUED | OUTPATIENT
Start: 2020-05-05 | End: 2020-05-06 | Stop reason: HOSPADM

## 2020-05-05 RX ORDER — NALOXONE HYDROCHLORIDE 0.4 MG/ML
.1-.4 INJECTION, SOLUTION INTRAMUSCULAR; INTRAVENOUS; SUBCUTANEOUS
Status: DISCONTINUED | OUTPATIENT
Start: 2020-05-05 | End: 2020-05-06 | Stop reason: HOSPADM

## 2020-05-05 RX ORDER — POLYETHYLENE GLYCOL 3350 17 G/17G
17 POWDER, FOR SOLUTION ORAL DAILY PRN
Status: DISCONTINUED | OUTPATIENT
Start: 2020-05-05 | End: 2020-05-06 | Stop reason: HOSPADM

## 2020-05-05 RX ORDER — ACETAMINOPHEN 325 MG/1
650 TABLET ORAL EVERY 4 HOURS PRN
Status: DISCONTINUED | OUTPATIENT
Start: 2020-05-05 | End: 2020-05-06 | Stop reason: HOSPADM

## 2020-05-05 RX ORDER — LIDOCAINE 40 MG/G
CREAM TOPICAL
Status: DISCONTINUED | OUTPATIENT
Start: 2020-05-05 | End: 2020-05-06 | Stop reason: HOSPADM

## 2020-05-05 RX ADMIN — SODIUM BICARBONATE 650 MG TABLET 650 MG: at 11:09

## 2020-05-05 RX ADMIN — PIPERACILLIN AND TAZOBACTAM 4.5 G: 4; .5 INJECTION, POWDER, FOR SOLUTION INTRAVENOUS at 22:01

## 2020-05-05 RX ADMIN — LEVOTHYROXINE SODIUM 150 MCG: 150 TABLET ORAL at 11:08

## 2020-05-05 RX ADMIN — CARBAMAZEPINE 150 MG: 100 SUSPENSION ORAL at 11:26

## 2020-05-05 RX ADMIN — BRIVARACETAM 100 MG: 10 SOLUTION ORAL at 11:39

## 2020-05-05 RX ADMIN — Medication 40 MG: at 11:25

## 2020-05-05 RX ADMIN — PIPERACILLIN AND TAZOBACTAM 4.5 G: 4; .5 INJECTION, POWDER, FOR SOLUTION INTRAVENOUS at 04:43

## 2020-05-05 RX ADMIN — ASPIRIN 81 MG 81 MG: 81 TABLET ORAL at 11:09

## 2020-05-05 RX ADMIN — CARBAMAZEPINE 150 MG: 100 SUSPENSION ORAL at 18:01

## 2020-05-05 RX ADMIN — PIPERACILLIN AND TAZOBACTAM 4.5 G: 4; .5 INJECTION, POWDER, FOR SOLUTION INTRAVENOUS at 17:08

## 2020-05-05 RX ADMIN — PIPERACILLIN AND TAZOBACTAM 4.5 G: 4; .5 INJECTION, POWDER, FOR SOLUTION INTRAVENOUS at 11:09

## 2020-05-05 RX ADMIN — BRIVARACETAM 100 MG: 10 SOLUTION ORAL at 22:01

## 2020-05-05 ASSESSMENT — ACTIVITIES OF DAILY LIVING (ADL)
ADLS_ACUITY_SCORE: 39
ADLS_ACUITY_SCORE: 39
RETIRED_COMMUNICATION: 2-->DIFFICULTY SPEAKING (NOT RELATED TO LANGUAGE BARRIER)
ADLS_ACUITY_SCORE: 41
BATHING: 4-->COMPLETELY DEPENDENT
RETIRED_EATING: 4-->COMPLETELY DEPENDENT
SWALLOWING: 2-->DIFFICULTY SWALLOWING LIQUIDS/FOODS
TRANSFERRING: 4-->COMPLETELY DEPENDENT
COGNITION: 0 - NO COGNITION ISSUES REPORTED
DRESS: 4-->COMPLETELY DEPENDENT
ADLS_ACUITY_SCORE: 41
ADLS_ACUITY_SCORE: 41
AMBULATION: 4-->COMPLETELY DEPENDENT
TOILETING: 4-->COMPLETELY DEPENDENT

## 2020-05-05 ASSESSMENT — MIFFLIN-ST. JEOR: SCORE: 1571

## 2020-05-05 NOTE — PROVIDER NOTIFICATION
Prescriber Notification Note    The pharmacist has communicated with this patient's provider regarding a concern or therapy recommendation.    Notified Person: SERGIO Torres  Date/Time of Notification: 5/5/20 7844  Interaction: text page  Concern/Recommendation:need anti-seizure meds reordered (home meds clarified)     Comments/Additional Details:orders received

## 2020-05-05 NOTE — PROGRESS NOTES
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     PLAN     1) Suspected sepsis due to aspiration pneumonitis:  He has been hospitalized here many times in the past several years for recurrent sepsis due to recurrent aspiration pneumonia. TTE 4/2019 showed preserved LVEF without wall motion abnormalities and PFT's 9/2019 were indeterminate for possible restriction; most recent Chest CT 9/2019 showed bilateral infiltrates and a small right sided pleural effusion. Most recently he was hospitalized here for sepsis due to recurrent aspiration pneumonia or pneumonitis. CXR showed bilateral opacifications and COVID-19 was negative. He was given Zosyn, IV fluid and nebulizers and his course reportedly improved and he was discharged 3/26/2020. Now he returns for recurrent fever and possible nausea and vomiting. He has fever, tachycardia, leukocytosis and elevated Lactate. He could have recurrent aspiration pneumonitis. Aspiration pneumonia is also possible and we are ruling out COVID-19.   Plan:    -- Resume tube feeds in AM.     -- Empiric Zosyn continued.     -- Continue COVID-19 neagtive    -- Check UA     2) Hyponatremia, acute, mild: Na is 132 and was 143 on 3/25/2020. Possibly due to hypovolemia.     3) TBI, spastic hemiplegia, severe dysphagia: Noted.      4) Pan-hypopituitarism: Resume Hydrocortisone when verified     5) Seizure disorder: resume anti-epileptic regimen when  verified     6) Hypothyroid: Resume Synthroid when verified     7) Pancreatic tail cyst lesion: Most recently imaged 9/2019 and found to be stable in size compared to prior imaging at 3.8 cm.     8) Hypogonadotropic hypogonadism.  Resume PTA Testosterone injection at discharge         Diet: NPO for Medical/Clinical Reasons Except for: Ice Chips, No Exceptions    DVT Prophylaxis: Pneumatic Compression Devices  Lerma Catheter: not present  Code Status: Full Code      Disposition Plan   Expected discharge: Tomorrow, recommended to prior living arrangement once adequate pain management/ tolerating PO medications.  Entered: Ricky Torres MD 05/05/2020, 4:25 PM       The patient's care was discussed with the Bedside Nurse and Patient.    Ricky Torres MD  Hospitalist Service  [facility]    ______________________________________________________________________    Interval History     Non-verbal   In no apparent distress    Data reviewed today: I reviewed all medications, new labs and imaging results over the last 24 hours. I personally reviewed no images or EKG's today.    Physical Exam   Vital Signs: Temp: 98.1  F (36.7  C) Temp src: Axillary BP: 95/63 Pulse: 85 Heart Rate: 70 Resp: 16 SpO2: 97 % O2 Device: None (Room air)    Weight: 156 lbs 1.37 oz     Constitutional: Awake and alert; no apparent distress  Respiratory: lungs diminished at the bases to auscultation bilaterally  Cardiovascular: regular S1 S2   GI: abdomen soft non tender non distended bowel sounds positive; G Tube site c/d/i  Lymph/Hematologic: no pallor, no cervical lymphadenopathy  Skin: no rash, good turgor  Musculoskeletal: no clubbing, cyanosis or edema  Neurologic: extra-ocular muscles intact; moves all four extremities    Data   Recent Labs   Lab 05/04/20  2109   WBC 14.4*   HGB 13.0*   MCV 86      *   POTASSIUM 4.2   CHLORIDE 97   CO2 28   BUN 18   CR 0.78   ANIONGAP 7   STEVE 8.5   *   ALBUMIN 3.2*   PROTTOTAL 7.6    BILITOTAL 0.3   ALKPHOS 107   ALT 30   AST 18     Recent Results (from the past 24 hour(s))   XR Chest Port 1 View    Narrative    CHEST ONE VIEW PORTABLE   5/4/2020 9:25 PM     HISTORY: Fever, history of recurrent aspiration.    COMPARISON: 3/23/2020.      Impression    IMPRESSION: Interstitial markings exaggerated by low lung volumes. No  airspace consolidation, pneumothorax, or pleural effusion.    ANNETTE CASTRO MD     Medications       aspirin  81 mg Oral or Feeding Tube Daily     Brivaracetam  100 mg Oral or Feeding Tube BID     carBAMazepine  150 mg Oral or Feeding Tube Q6H     levothyroxine  150 mcg Oral QAM     pantoprazole  40 mg Per J Tube Daily     piperacillin-tazobactam  4.5 g Intravenous Q6H     sodium bicarbonate  650 mg Oral Daily     sodium chloride (PF)  3 mL Intracatheter Q8H

## 2020-05-05 NOTE — PLAN OF CARE
RECEIVING UNIT ED HANDOFF REVIEW    ED Nurse Handoff Report was reviewed by: Dae Stewart RN on May 4, 2020 at 11:37 PM

## 2020-05-05 NOTE — ED NOTES
Bed: ED03  Expected date:   Expected time:   Means of arrival:   Comments:  E3  59M Not feeling well/Fever  Possible covid  2044

## 2020-05-05 NOTE — PLAN OF CARE
DATE & TIME: 05/5/20 [id number]  Cognitive Concerns/ Orientation : Alert, nonverbal.   BEHAVIOR & AGGRESSION TOOL COLOR: Green  CIWA SCORE: NA  ABNL VS/O2: BP soft 95/63, other VSS, O2 97% RA   MOBILITY: Spastic hemiplegia from hx of TBI. Total care, up with lift. Turn/repo q2h.   PAIN MANAGMENT: Denies  DIET: NPO, receiving meds via GJ tube.   BOWEL/BLADDER: incontinent of B/B  ABNL LAB/BG: WBC 14.4 Na 132,   DRAIN/DEVICES: PIV, GJ tube (clamped)  TELEMETRY RHYTHM: NA   SKIN: Intact, blanchable redness coccyx area, skin intact, mepilex in place for protection.   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 On IV Zosyn q6h.     Update [id number]  /57, afebrile 97.6. Denies pain/SOB. Possibly resuming Tube feeding tomorrow AM. Pt's mother Savannah updated.

## 2020-05-05 NOTE — PLAN OF CARE
Summary:     DATE & TIME: 05/05/20   [id number]  Cognitive Concerns/ Orientation : Alert  BEHAVIOR & AGGRESSION TOOL COLOR: green  CIWA SCORE: NA  ABNL VS/O2: vss, 95% RA  MOBILITY: Total care, lift  PAIN MANAGMENT: Denies  DIET: NPO G tube  BOWEL/BLADDER: incontinent of B/B  ABNL LAB/BG: wbc 14.4, Na 132,lactic 2.2  DRAIN/DEVICES: PIV,Gtube  TELEMETRY RHYTHM:   SKIN: Intact, redness blanchable of coccyx area, mepilex applied  TESTS/PROCEDURES:   D/C DAY/GOALS/PLACE: pending  OTHER IMPORTANT INFO: covid-19 r/o. Pt.  is non-verbal. Turn/Repo Q2hr, likes watch fishes on TV.

## 2020-05-05 NOTE — ED NOTES
[facility]  ED Nurse Handoff Report    ED Chief complaint: Fever      ED Diagnosis:   Final diagnoses:   None       Code Status: Full Code (per previous visits)    Allergies:   Allergies   Allergen Reactions     Dilantin [Phenytoin Sodium]      Scopolamine Hives     Hives in response to scopolamine PATCH      Valproic Acid      Toxicity c bone marrow suspension, elevated ammonia levels        Patient Story: Pt from home where he receives care from nursing staff and mother, who is his guardian. Pt has history of aspiration pneumonia and spiked a temp this evening. Mother administered tylenol per G-tube and temp did not decrease. Brought in per EMS    Focused Assessment:  Mother states pt has not had any other increase in symptoms. Normally has an occasional cough and this has not increased. LS clear. VSS. See labs. Pt has history of TBI and is non-verbal but follows staff with eyes. Cooperative with cares.    Treatments and/or interventions provided: Labs, CXR, UA, fluids, medications    Patient's response to treatments and/or interventions: Tolerated well.    To be done/followed up on inpatient unit:  Continue plan of care    Does this patient have any cognitive concerns?: Hx Head Trauma    Activity level - Baseline/Home:  Stand with Assist (1 assist for transfer to w/c), w/c bound  Activity Level - Current:   Stand with assist x2    Patient's Preferred language: English   Needed?: No    Isolation: None and Contact Covid precautions  Infection:  MRSA, VRE, rule out Covid    Bariatric?: No    Vital Signs:   Vitals:    05/04/20 2100 05/04/20 2115 05/04/20 2130 05/04/20 2145   BP: 109/80 106/55 104/54 105/53   Pulse: 102 98 94 93   Resp:       Temp:       TempSrc:       SpO2: 94% 92% 92% 91%   Weight:       Height:           Cardiac Rhythm:     Was the PSS-3 completed:   No -Pt unable to answer  What interventions are required if any?               Family Comments: Mother available by  phone  OBS brochure/video discussed/provided to patient/family: No              Name of person given brochure if not patient: n/a              Relationship to patient: n/a    For the majority of the shift this patient's behavior was Green.   Behavioral interventions performed were Reassurance, redirection.    ED NURSE PHONE NUMBER: ED 1

## 2020-05-05 NOTE — ED TRIAGE NOTES
Pt spiked a temp this evening of 101 at home. Mother administered either tylenol or aspirin per EMS and temp spiked back up. Pt has history of TBI.

## 2020-05-05 NOTE — ED NOTES
Spoke to mother. She states other than the fever, pt hasn't had any increase in other symptoms. Pt normally has occasional cough and this hasn't increased. Did have a small emesis of phlegm earlier tonight. Mother reports giving pt 2 tabs of 650mg tylenol at 1930.
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
## 2020-05-05 NOTE — PLAN OF CARE
Admission    Patient arrives to room 623 via cart from ED.      Inpatient nursing criteria listed below were met:    PCD's Documented: Yes  Skin issues/needs documented :Yes  Isolation education started/completed No  Patient allergies verified with patient: No  Verified completion of [place] Risk Assessment Tool:  No  Verified completion of Guardianship screening tool: Yes  Fall Prevention: Care plan updated, Education given and documented Yes  Care Plan initiated: Yes  Home medications documented in belongings flowsheet: NA  Patient belongings documented in belongings flowsheet: Yes  Reminder note (belongings/ medications) placed in discharge instructions:Yes  Admission profile/ required documentation complete: No  Bedside Report Letter given and explained to patient No

## 2020-05-05 NOTE — ED PROVIDER NOTES
History     Chief Complaint:  Fever      HPI   history limited by patient's nonverbal status, and supplemented by electronic chart review and discussion with his guardian mother.    Keyon Farias is a 59 year old male with a history of TBI with aphasia and right hemiparesis, pneumonia, recurrent seizures, and sepsis who presents to the emergency department for evaluation of a fever.  He developed a fever earlier today.  Mother states he vomited at one point, and he has a long history of aspiration pneumonia leading to hospitalizations, with most recent episode a few months ago.  He receives feedings through gastric tube.  No new trauma.  No known COVID-19 exposures though he has two home health nurses who assist with his care.    Allergies:  Dilantin  Scopolamine  Valproic acid      Medications:    Mucomyst  Albuterol  Aspirin 81 mg  Briviact  Tegretol  Ferrous sulfate  Cortef  Hydrocortisone  Levothyroxine  Melatonin  Reglan  Micatin  Bactroban  Protonix  Depotestosterone      Past Medical History:    Aphasia  TBI  DVT  GERD  Panhypopituitarism  Pneumonia  Seizures  Septic shock  Spastic hemiplegia  Thyroid disease  CVA  UTI  Ventricular fibrillation  Sepsis   Hyponatremia   Cardiac arrest  Right hemiparesis   Acute respiratory failure with hypoxia   Encephalopathy   SIRS    Past Surgical History:    Reconstructive facial surgery  Appendectomy  PEG tube  Tracheostomy   Right hand repair     Family History:    Cancer     Social History:  Tobacco Use: Former, quit: 1989  Alcohol Use: No  PCP: Carlos Gomez    Review of Systems  Unable to obtain as he is nonverbal    Physical Exam     Patient Vitals for the past 24 hrs:   BP Temp Temp src Pulse Heart Rate Resp SpO2 Height Weight   05/05/20 0057 103/55 97.7  F (36.5  C) Oral -- 75 20 95 % -- --   05/05/20 0055 -- -- -- -- -- -- -- -- 70.8 kg (156 lb 1.4 oz)   05/04/20 2300 104/63 -- -- 85 -- -- 94 % -- --   05/04/20 2200 103/60 -- -- 91 -- -- 92 % -- --   05/04/20  2145 105/53 -- -- 93 -- -- 91 % -- --   05/04/20 2130 104/54 -- -- 94 -- -- 92 % -- --   05/04/20 2115 106/55 -- -- 98 -- -- 92 % -- --   05/04/20 2100 109/80 -- -- 102 -- -- 94 % -- --   05/04/20 2054 113/74 101.6  F (38.7  C) Axillary -- 100 20 94 % 1.829 m (6') 74.8 kg (165 lb)     Physical Exam  General: Chronically ill-appearing male recumbent in room 3  HENT: mucous membranes moist   CV: rate as above  Resp: normal effort, no crackles or wheezing, no cough observed  GI: abdomen soft, no appreciable tenderness, feeding tube in place  MSK: no bony tenderness, no CVAT  Skin: appropriately warm and dry  Neuro: Eyes open, tracks movements, does not move extremities, does not follow commands  Psych: calm, nonverbal so unable to fully assess    Emergency Department Course   Imaging:  Chest XR Port 1 View:  IMPRESSION: Interstitial markings exaggerated by low lung volumes. No  airspace consolidation, pneumothorax, or pleural effusion.  Reading per radiology.    Radiographic findings were communicated with the patient who voiced understanding of the findings.    Laboratory:  CBC: WBC: 14.4 (H), HGB: 13.0 (L), PLT: 178  CMP: Glucose 116 (H), Sodium: 132 (L), Albumin: 3.2 (L), o/w WNL (Creatinine: 0.78)    2128 Lactic acid whole blood: 2.2 (H)    COVID-19 (Coronavirus) PCR to [place] [facility] Nasopharyngeal (NP) Swab in UTM: In process    UA: Clear yellow urine, glucose: 70, protein albumin: 10, urobilinogen: 4.0, mucous: present, otherwise WNL    2109 Blood culture: In process  2156 Blood culture: In process    Procalcitonin: <0.05    Interventions:  2256 0.9% Sodium Chloride BOLUS 1000 mLs IV   2303 Zosyn 3.375 g IV    Emergency Department Course:  Nursing notes and vitals reviewed. I performed an exam of the patient as documented above.     IV inserted. Medicine administered as documented above. Blood drawn. COVID swab obtained. This was sent to the lab for further testing, results above.    The patient provided a  urine sample here in the emergency department. This was sent for laboratory testing, findings above.     The patient was sent for a chest XR while in the emergency department, findings above.     2227 I called the patient's mother by phone and discussed his case.  She agrees with the plan for IV antibiotics and hospitalization.    2240  I consulted with Dr. Garcia of the hospitalist services. They are in agreement to accept the patient for admission.    Findings and plan explained to the Patient who consents to admission. Discussed the patient with Dr. Garcia, who will admit the patient to a med/surg bed for further monitoring, evaluation, and treatment.    Impression & Plan    Covid-19  Keyon Farias was evaluated during a global COVID-19 pandemic, which necessitated consideration that the patient might be at risk for infection with the SARS-CoV-2 virus that causes COVID-19.   Applicable protocols for evaluation were followed during the patient's care.   COVID-19 was considered as part of the patient's evaluation. The plan for testing is:  a test was obtained during this visit.    Medical Decision Making:  This history of frequent aspiration leading to sepsis and hospitalization certainly raises the possibility that this is the case again, though alternate etiologies including bloodstream infection, COVID-19, UTI, cellulitis, and others were considered.  His abdomen is soft I do not think he requires emergent advanced imaging.  Broad-spectrum antibiotics were administered after cultures were drawn.  I spoke with his mother who agrees with this plan.  COVID-19 swab was sent and precautions maintained.  He was admitted to the hospitalist service.      Diagnosis:    ICD-10-CM    1. Acute febrile illness  R50.9    2. History of airway aspiration  Z87.898    3. Sepsis, due to unspecified organism, unspecified whether acute organ dysfunction present (H)  A41.9        Disposition:  Admitted to Dr. Radha Germain  Disclosure:  I, Andriy Barajas, am serving as a scribe on 5/4/2020 at 9:17 PM to personally document services performed by Kenneth Jett based on my observations and the provider's statements to me.     This record was created at least in part using electronic voice recognition software, so please excuse any typographical errors.    Andriy Barajas  5/4/2020    EMERGENCY DEPARTMENT       Kenneth Estrada MD  05/05/20 0119

## 2020-05-05 NOTE — PHARMACY-ADMISSION MEDICATION HISTORY
Pharmacy Medication History  Admission medication history interview status for the 5/4/2020  admission is complete. See EPIC admission navigator for prior to admission medications     Medication history sources: Patient's family/friend (mother, Savannah, who is caregiver)  Medication history source reliability: Good  Adherence assessment: Good    Significant changes made to the medication list:  none      Additional medication history information:   none    Medication reconciliation completed by provider prior to medication history? No    Time spent in this activity: 15 minutes      Prior to Admission medications    Medication Sig Last Dose Taking? Auth Provider   acetylcysteine (MUCOMYST) 20 % neb solution Take 2 mLs by nebulization 4 times daily With albuterol at 0700, 1100, 1500, and 1900  5/4/2020 at Unknown time Yes Unknown, Entered By History   albuterol (PROVENTIL) (5 MG/ML) 0.5% neb solution Take 2.5 mg by nebulization every 4 hours (while awake) 0700 1100 1500 1900 with mucomyst  5/4/2020 at Unknown time Yes Unknown, Entered By History   aspirin (ASA) 81 MG chewable tablet 81 mg by Oral or Feeding Tube route daily At 0900 5/4/2020 at Unknown time Yes Unknown, Entered By History   bacitracin ointment Apply topically daily as needed for wound care To PEG site.  prn Yes Unknown, Entered By History   Bioflavonoid Products (VITAMIN C PLUS) 1000 MG TABS 1 tablet by Oral or FT or NG tube route daily  5/4/2020 at Unknown time Yes Unknown, Entered By History   Brivaracetam (BRIVIACT) 10 MG/ML solution 100 mg by Oral or Feeding Tube route 2 times daily 0900, 2100 5/4/2020 at 2100 Yes Unknown, Entered By History   calcium carbonate 1250 (500 CA) MG/5ML SUSP suspension 5 mLs (1,250 mg) by Per J Tube route 3 times daily (with meals) 5/4/2020 at Unknown time Yes Mariana Venegas MD   carBAMazepine (TEGRETOL) 100 MG/5ML suspension 150 mg by Oral or Feeding Tube route every 6 hours At 06:00, 12:00, 18:00 and 24:00  for seizures  5/4/2020 at 1800 Yes Unknown, Entered By History   ferrous sulfate 220 (44 Fe) MG/5ML ELIX 220 mg by Per Feeding Tube route daily 5/4/2020 at Unknown time Yes Unknown, Entered By History   Guar Gum (FIBER MODULAR, NUTRISOURCE FIBER,) packet 1 packet by Per G Tube route daily 5/4/2020 at Unknown time Yes Starr Champion MD   hydrocortisone (CORTEF) 5 MG tablet 10 mg by Oral or FT or NG tube route daily (with dinner) At 1500 5/4/2020 at 1500 Yes Unknown, Entered By History   hydrocortisone (CORTEF) 5 MG tablet 20 mg by Oral or FT or NG tube route every morning  5/4/2020 at am Yes Unknown, Entered By History   hydrocortisone 1 % CREA cream Place rectally 2 times daily as needed for other Apply to reddened memo areas as needed prn Yes Unknown, Entered By History   levothyroxine (SYNTHROID/LEVOTHROID) 150 MCG tablet Take 150 mcg by mouth every morning 5/4/2020 at am Yes Unknown, Entered By History   melatonin (MELATONIN) 1 MG/ML LIQD liquid 6 mg by Per NG tube route At Bedtime  5/4/2020 at pm Yes Unknown, Entered By History   metoclopramide (REGLAN) 5 MG/5ML solution 5 mg by Per Feeding Tube route 2 times daily 5/4/2020 at Unknown time Yes Unknown, Entered By History   miconazole (MICATIN) 2 % AERP powder Apply topically 2 times daily as needed  prn Yes Unknown, Entered By History   mupirocin (BACTROBAN) 2 % external ointment Apply topically 2 times daily as needed  prn Yes Reported, Patient   pantoprazole (PROTONIX) 2 mg/mL SUSP suspension 20 mLs (40 mg) by Per J Tube route daily 5/4/2020 at Unknown time Yes Washington Connors MD   potassium & sodium phosphates (NEUTRA-PHOS) 280-160-250 MG Packet Take 1 packet by mouth 3 times daily  5/4/2020 at Unknown time Yes Yanely Liriano MD   Scopolamine HBr POWD Dispense #90. Mix contents with small amount of water for admin via J-tube.  Administer 0.8 mg three times each day. 5/4/2020 at Unknown time Yes Jennie Bermudez MD   Skin Protectants, Misc.  (BALMEX SKIN PROTECTANT) OINT Externally apply topically 2 times daily as needed (irritation) Applay to reddened memo areas twice daily as needed prn Yes Unknown, Entered By History   sodium bicarbonate 650 MG tablet Take 1 tablet (650 mg) by mouth daily 5/4/2020 at Unknown time Yes Ricky Torres MD   testosterone cypionate (DEPOTESTOTERONE CYPIONATE) 200 MG/ML injection Inject 76 mg into the muscle See Admin Instructions Every 2 weeks on Thursdays  76 mg or 0.38 mL 4/24/2020 at Unknown time Yes Unknown, Entered By History   vitamin D3 (CHOLECALCIFEROL) 2000 units (50 mcg) tablet Take 2,000 Units by mouth daily Crush and feed via j-tube @@ 0900 5/4/2020 at Unknown time Yes Unknown, Entered By History   order for DME Equipment being ordered: Nebulizer   Starr Champion MD

## 2020-05-06 VITALS
BODY MASS INDEX: 22.14 KG/M2 | TEMPERATURE: 97.9 F | RESPIRATION RATE: 20 BRPM | SYSTOLIC BLOOD PRESSURE: 93 MMHG | OXYGEN SATURATION: 93 % | HEART RATE: 85 BPM | WEIGHT: 163.5 LBS | DIASTOLIC BLOOD PRESSURE: 56 MMHG | HEIGHT: 72 IN

## 2020-05-06 LAB
ANION GAP SERPL CALCULATED.3IONS-SCNC: 6 MMOL/L (ref 3–14)
BASOPHILS # BLD AUTO: 0.1 10E9/L (ref 0–0.2)
BASOPHILS NFR BLD AUTO: 1.5 %
BUN SERPL-MCNC: 14 MG/DL (ref 7–30)
CALCIUM SERPL-MCNC: 8.6 MG/DL (ref 8.5–10.1)
CHLORIDE SERPL-SCNC: 105 MMOL/L (ref 94–109)
CO2 SERPL-SCNC: 25 MMOL/L (ref 20–32)
CREAT SERPL-MCNC: 1.07 MG/DL (ref 0.66–1.25)
DIFFERENTIAL METHOD BLD: ABNORMAL
EOSINOPHIL # BLD AUTO: 1 10E9/L (ref 0–0.7)
EOSINOPHIL NFR BLD AUTO: 13.6 %
ERYTHROCYTE [DISTWIDTH] IN BLOOD BY AUTOMATED COUNT: 13.8 % (ref 10–15)
GFR SERPL CREATININE-BSD FRML MDRD: 76 ML/MIN/{1.73_M2}
GLUCOSE SERPL-MCNC: 80 MG/DL (ref 70–99)
HCT VFR BLD AUTO: 39.4 % (ref 40–53)
HGB BLD-MCNC: 13 G/DL (ref 13.3–17.7)
IMM GRANULOCYTES # BLD: 0 10E9/L (ref 0–0.4)
IMM GRANULOCYTES NFR BLD: 0.1 %
LYMPHOCYTES # BLD AUTO: 2.3 10E9/L (ref 0.8–5.3)
LYMPHOCYTES NFR BLD AUTO: 30.8 %
MCH RBC QN AUTO: 28.7 PG (ref 26.5–33)
MCHC RBC AUTO-ENTMCNC: 33 G/DL (ref 31.5–36.5)
MCV RBC AUTO: 87 FL (ref 78–100)
MONOCYTES # BLD AUTO: 1 10E9/L (ref 0–1.3)
MONOCYTES NFR BLD AUTO: 14.2 %
NEUTROPHILS # BLD AUTO: 2.9 10E9/L (ref 1.6–8.3)
NEUTROPHILS NFR BLD AUTO: 39.8 %
NRBC # BLD AUTO: 0 10*3/UL
NRBC BLD AUTO-RTO: 0 /100
PLATELET # BLD AUTO: 163 10E9/L (ref 150–450)
POTASSIUM SERPL-SCNC: 4.1 MMOL/L (ref 3.4–5.3)
RBC # BLD AUTO: 4.53 10E12/L (ref 4.4–5.9)
SODIUM SERPL-SCNC: 136 MMOL/L (ref 133–144)
WBC # BLD AUTO: 7.3 10E9/L (ref 4–11)

## 2020-05-06 PROCEDURE — 25000128 H RX IP 250 OP 636: Performed by: HOSPITALIST

## 2020-05-06 PROCEDURE — 25000132 ZZH RX MED GY IP 250 OP 250 PS 637: Mod: GY | Performed by: STUDENT IN AN ORGANIZED HEALTH CARE EDUCATION/TRAINING PROGRAM

## 2020-05-06 PROCEDURE — 85025 COMPLETE CBC W/AUTO DIFF WBC: CPT | Performed by: HOSPITALIST

## 2020-05-06 PROCEDURE — 99239 HOSP IP/OBS DSCHRG MGMT >30: CPT | Performed by: STUDENT IN AN ORGANIZED HEALTH CARE EDUCATION/TRAINING PROGRAM

## 2020-05-06 PROCEDURE — 80048 BASIC METABOLIC PNL TOTAL CA: CPT | Performed by: HOSPITALIST

## 2020-05-06 PROCEDURE — 36415 COLL VENOUS BLD VENIPUNCTURE: CPT | Performed by: HOSPITALIST

## 2020-05-06 RX ORDER — DEXTROSE MONOHYDRATE 100 MG/ML
INJECTION, SOLUTION INTRAVENOUS CONTINUOUS PRN
Status: DISCONTINUED | OUTPATIENT
Start: 2020-05-06 | End: 2020-05-06 | Stop reason: HOSPADM

## 2020-05-06 RX ORDER — GUAR GUM
1 PACKET (EA) ORAL DAILY
Status: DISCONTINUED | OUTPATIENT
Start: 2020-05-06 | End: 2020-05-06 | Stop reason: HOSPADM

## 2020-05-06 RX ADMIN — CARBAMAZEPINE 150 MG: 100 SUSPENSION ORAL at 16:56

## 2020-05-06 RX ADMIN — PIPERACILLIN AND TAZOBACTAM 4.5 G: 4; .5 INJECTION, POWDER, FOR SOLUTION INTRAVENOUS at 16:33

## 2020-05-06 RX ADMIN — CARBAMAZEPINE 150 MG: 100 SUSPENSION ORAL at 06:29

## 2020-05-06 RX ADMIN — LEVOTHYROXINE SODIUM 150 MCG: 150 TABLET ORAL at 06:30

## 2020-05-06 RX ADMIN — CARBAMAZEPINE 150 MG: 100 SUSPENSION ORAL at 10:47

## 2020-05-06 RX ADMIN — BRIVARACETAM 100 MG: 10 SOLUTION ORAL at 09:22

## 2020-05-06 RX ADMIN — ASPIRIN 81 MG 81 MG: 81 TABLET ORAL at 09:22

## 2020-05-06 RX ADMIN — Medication 40 MG: at 09:22

## 2020-05-06 RX ADMIN — PIPERACILLIN AND TAZOBACTAM 4.5 G: 4; .5 INJECTION, POWDER, FOR SOLUTION INTRAVENOUS at 05:31

## 2020-05-06 RX ADMIN — CARBAMAZEPINE 150 MG: 100 SUSPENSION ORAL at 00:50

## 2020-05-06 RX ADMIN — SODIUM BICARBONATE 650 MG TABLET 650 MG: at 09:21

## 2020-05-06 RX ADMIN — PIPERACILLIN AND TAZOBACTAM 4.5 G: 4; .5 INJECTION, POWDER, FOR SOLUTION INTRAVENOUS at 10:46

## 2020-05-06 ASSESSMENT — ACTIVITIES OF DAILY LIVING (ADL)
ADLS_ACUITY_SCORE: 43
ADLS_ACUITY_SCORE: 41
ADLS_ACUITY_SCORE: 39
ADLS_ACUITY_SCORE: 43
ADLS_ACUITY_SCORE: 41

## 2020-05-06 ASSESSMENT — MIFFLIN-ST. JEOR: SCORE: 1604.63

## 2020-05-06 NOTE — PROGRESS NOTES
Received RN request to provide TF recs for pt (no MD consult has been received - note possible discharge home today)  Pt is well known to Nutrition Services  Diet: NPO  No IVF infusing  Receives TF at home via J-tube (managed by pt's mother, typically runs Jevity 1.5)  Previous TF regimen while hospitalized in March:    Type of Feeding Tube: JT (chronic, last replaced in Jan 2020)     Enteral Frequency:  Cycled during the day     Enteral Regimen: Isosource 1.5 at 100 mL/hr x 12 hours     Run 8 am - 8 pm     Total Enteral Provisions:     1200 mL provides 1800 kcal, 82 gm protein, 211 gm CHO, 18 gm fiber and 912 mL H20.     Add 1 pkt NutriSource Fiber for 3 g additional fiber     Meets > 100% of DRI's.     Free water flush of  200 mL q4 hours (1200 mL)    Once pt home, mother to resume pt's usual TF regimen      ADDENDUM:  MD consult received for TF orders this afternoon  Note pt has transport ride scheduled for 6pm this evening  Completed TF orders in Epic as noted above    Aurora Yousif RD, LD  Clinical Dietitian - [facility]    Pager - [phone]

## 2020-05-06 NOTE — PLAN OF CARE
Cognitive Concerns/ Orientation : Alert, answers with thumbs up/yes or no sometimes. Can follow commands.   BEHAVIOR & AGGRESSION TOOL COLOR: Green    ABNL VS/O2: VSS on RA  MOBILITY: Total care, not out of bed. Turn and repositioning q2hrs.   PAIN MANAGMENT: Denies when asked, appears comfortable.   DIET: NPO, dietician consulted for TF recommendations. Orders received, thank you.   BOWEL/BLADDER: Incontinent of B/B.  ABNL LAB/BG: Sodium 136, WBC 7.3  DRAIN/DEVICES: PIV removed for discharge.  TELEMETRY RHYTHM: N/A  SKIN: Intact, blanchable redness coccyx area, skin intact, mepilex in place for prevention.   TESTS/PROCEDURES: None scheduled.   D/C DAY/GOALS/PLACE: Today.  CC established stretcher transportation for 1800.  Mother does not have help available until after 1800.   OTHER IMPORTANT INFO:   --Contact precautions maintained.   --Dry non-productive cough.   --Unable to collect sputum.    Tolerating tube feeding since 1500. Remaining Isosource sent with patient (2).    AVS reviewed with mother, over the phone.  She denies any additional questions and verbalizes understanding.  Medications sent with EMS/stretcher transport personnel.

## 2020-05-06 NOTE — CONSULTS
Care Transition Initial Assessment - RN         DATA   Active Problems:   Recurrent Aspiration Pneumonia, ruled out Covid 19     Cognitive Status: awake, hx TBI     Contact information and PCP information verified: Yes     Insurance concerns: No Insurance issues identified  ASSESSMENT  Patient currently receives the following services: [facility] (P:[phone]) (Fax:[phone])for RN 12 Hr coverage daily and also receives a 12 hr PCA adarsh/night through [facility] (currently 80.5 hours every 7 days) and TF with supplies through [facility].     Identified issues/concerns regarding health management:   Pt resides with his Mother Savannah.  She is also his Legal Guardian.   Savannah feels pt should not discharge unless his temp is <98, as he runs a low temp at baseline.  She is aware of temps in the 97 range yesterday and 98.2 this AM.  Usually she likes pt transitioning to home early in the day.  There is not a nurse available today, so she asked that transportation be set up 6PM or later, then the PCA will be there.  His PCA also lives with them.  Savannah noted that she is having some difficulty keeping the 12hr nurse filled on the weekends.  Hopefully this will not result in pt needing more frequent admissions.  With hinting on maybe needing facility placement, she will not have any discussion concerning this, as she believes he would not be alive today if he lived anywhere else.    Savannah is not able to physically help care for pt due to back and knee issues.    This is his 4th admission this year. Pt has not had any readmissions in the past 30 days.   In 2019, he had 19 admissions.         PLAN  Financial costs for the patient include:NA     Patient/family is agreeable to the plan?  Yes: home today as long as his temp is in the 97 range.    [id number] scheduled for 6:00PM today via stretcher.    His PCP is Dr Gomez [facility].  This clinic will be calling pt's mother  with appointment time.  Will fax orders to [facility] when available.     Karen Collins RN  Inpatient Care Coordination  [phone]

## 2020-05-06 NOTE — DISCHARGE SUMMARY
[facility]  Hospitalist Discharge Summary      Date of Admission:  5/4/2020  Date of Discharge:  5/6/2020  Discharging Provider: Ricky Torres MD      Discharge Diagnoses     Aspiration PNA    Follow-ups Needed After Discharge   Follow-up Appointments     Follow-up and recommended labs and tests       Follow up with primary care provider, Carlos Gomez, within 7 days for   hospital follow- up.  The following labs/tests are recommended: None.           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     PLAN     1) Suspected sepsis due to aspiration pneumonitis:  He has been hospitalized here many times in the past several years for recurrent sepsis due to recurrent aspiration pneumonia. TTE 4/2019 showed preserved LVEF without wall motion abnormalities and PFT's 9/2019 were indeterminate for possible restriction; most recent Chest CT 9/2019 showed bilateral infiltrates and a small right sided pleural effusion. Most recently he was hospitalized here for sepsis due to recurrent aspiration pneumonia or pneumonitis. CXR showed bilateral opacifications and COVID-19 was negative. Now he returns for recurrent fever and possible nausea and vomiting. He likely has recurrent aspiration pneumonitis. Aspiration pneumonia also likely.  COVID was negative.   Plan:    -- Augmentin at discharge.      2) Hyponatremia, acute, mild: Resolved     3) TBI, spastic hemiplegia, severe dysphagia: Noted.      4) Pan-hypopituitarism: Resume Hydrocortisone     5) Seizure disorder: resume anti-epileptic regimen     6) Hypothyroid: Resume Synthroid     7) Pancreatic tail cyst lesion: Most recently imaged 9/2019 and found to be stable in size compared to prior imaging at 3.8 cm.     8) Hypogonadotropic hypogonadism.  Resume PTA Testosterone injection at discharge         Consultations This Hospital Stay   CARE TRANSITION RN/SW IP CONSULT  NUTRITION SERVICES ADULT IP CONSULT  PHARMACY IP CONSULT    Code Status   Full Code    Time Spent on this Encounter   I, Ricky Torres MD, personally saw the patient today and spent greater than 30 minutes discharging this patient.       Ricky Torres MD  [facility]  ______________________________________________________________________    Physical Exam   Vital Signs: Temp: 98.2  F (36.8  C) Temp src: Oral BP: 113/70   Heart Rate: 66 Resp: 20 SpO2: 94 % O2 Device: None (Room air)    Weight: 163 lbs 8 oz       Primary Care Physician   Carlos Gomez    Discharge Orders      Home care nursing referral      Reason for your hospital stay    You had pneumonia from suspected aspiration event.     Follow-up and recommended labs and tests     Follow up with primary care provider, Carlos Gomez, within 7 days for hospital follow- up.  The following labs/tests are recommended: None.     Activity    Your activity upon discharge: activity as tolerated     Diet    Follow this diet upon discharge: Orders Placed This Encounter      Adult Formula Drip Feeding: Continuous Isosource 1.5; Jejunostomy; Goal Rate: 100 mL/hr x 12 hrs (8am-8pm); mL/hr; Medication - Feeding Tube Flush Frequency: At least 15-30 mL water before and after medication administration and with tube clogging...      NPO for Medical/Clinical Reasons Except for: Ice  Chips, No Exceptions       Significant Results and Procedures   Most Recent 3 CBC's:  Recent Labs   Lab Test 05/06/20  0841 05/04/20 2109 03/26/20  0619 03/25/20  0810   WBC 7.3 14.4*  --  8.9   HGB 13.0* 13.0*  --  11.8*   MCV 87 86  --  90    178 194 174     Most Recent 3 BMP's:  Recent Labs   Lab Test 05/06/20  0841 05/04/20 2109 03/26/20 0619 03/25/20  0810    132*  --  143   POTASSIUM 4.1 4.2  --  3.6   CHLORIDE 105 97  --  112*   CO2 25 28  --  27   BUN 14 18  --  12   CR 1.07 0.78 0.90 0.90   ANIONGAP 6 7  --  4   STEVE 8.6 8.5  --  8.1*   GLC 80 116*  --  78     Most Recent 2 LFT's:  Recent Labs   Lab Test 05/04/20 2109 03/23/20  2122   AST 18 29   ALT 30 30   ALKPHOS 107 107   BILITOTAL 0.3 0.3     Most Recent 3 INR's:  Recent Labs   Lab Test 01/01/20  0735 02/07/17  0452 01/30/17  0340   INR 1.28* 1.27* 1.32*   ,   Results for orders placed or performed during the hospital encounter of 05/04/20   XR Chest Port 1 View    Narrative    CHEST ONE VIEW PORTABLE   5/4/2020 9:25 PM     HISTORY: Fever, history of recurrent aspiration.    COMPARISON: 3/23/2020.      Impression    IMPRESSION: Interstitial markings exaggerated by low lung volumes. No  airspace consolidation, pneumothorax, or pleural effusion.    ANNETTE CASTRO MD       Discharge Medications   Current Discharge Medication List      START taking these medications    Details   amoxicillin-clavulanate (AUGMENTIN) 875-125 MG tablet Take 1 tablet by mouth 2 times daily for 7 days  Qty: 14 tablet, Refills: 0    Associated Diagnoses: Sepsis, due to unspecified organism, unspecified whether acute organ dysfunction present (H)         CONTINUE these medications which have NOT CHANGED    Details   acetylcysteine (MUCOMYST) 20 % neb solution Take 2 mLs by nebulization 4 times daily With albuterol at 0700, 1100, 1500, and 1900       albuterol (PROVENTIL) (5 MG/ML) 0.5% neb solution Take 2.5 mg by nebulization every 4 hours (while awake) 0700 1100  1500 1900 with mucomyst       aspirin (ASA) 81 MG chewable tablet 81 mg by Oral or Feeding Tube route daily At 0900      bacitracin ointment Apply topically daily as needed for wound care To PEG site.       Bioflavonoid Products (VITAMIN C PLUS) 1000 MG TABS 1 tablet by Oral or FT or NG tube route daily       Brivaracetam (BRIVIACT) 10 MG/ML solution 100 mg by Oral or Feeding Tube route 2 times daily 0900, 2100      calcium carbonate 1250 (500 CA) MG/5ML SUSP suspension 5 mLs (1,250 mg) by Per J Tube route 3 times daily (with meals)  Qty: 450 mL    Associated Diagnoses: Malnutrition (H)      carBAMazepine (TEGRETOL) 100 MG/5ML suspension 150 mg by Oral or Feeding Tube route every 6 hours At 06:00, 12:00, 18:00 and 24:00 for seizures       ferrous sulfate 220 (44 Fe) MG/5ML ELIX 220 mg by Per Feeding Tube route daily      Guar Gum (FIBER MODULAR, NUTRISOURCE FIBER,) packet 1 packet by Per G Tube route daily  Qty: 30 packet, Refills: 0    Associated Diagnoses: Pneumonia of both lower lobes due to infectious organism (H)      !! hydrocortisone (CORTEF) 5 MG tablet 10 mg by Oral or FT or NG tube route daily (with dinner) At 1500      !! hydrocortisone (CORTEF) 5 MG tablet 20 mg by Oral or FT or NG tube route every morning       hydrocortisone 1 % CREA cream Place rectally 2 times daily as needed for other Apply to reddened memo areas as needed      levothyroxine (SYNTHROID/LEVOTHROID) 150 MCG tablet Take 150 mcg by mouth every morning      melatonin (MELATONIN) 1 MG/ML LIQD liquid 6 mg by Per NG tube route At Bedtime       metoclopramide (REGLAN) 5 MG/5ML solution 5 mg by Per Feeding Tube route 2 times daily      miconazole (MICATIN) 2 % AERP powder Apply topically 2 times daily as needed       mupirocin (BACTROBAN) 2 % external ointment Apply topically 2 times daily as needed       pantoprazole (PROTONIX) 2 mg/mL SUSP suspension 20 mLs (40 mg) by Per J Tube route daily  Qty: 400 mL, Refills: 1    Associated Diagnoses:  Gastroesophageal reflux disease, esophagitis presence not specified      potassium & sodium phosphates (NEUTRA-PHOS) 280-160-250 MG Packet Take 1 packet by mouth 3 times daily       Scopolamine HBr POWD Dispense #90. Mix contents with small amount of water for admin via J-tube.  Administer 0.8 mg three times each day.  Qty: 90 Bottle, Refills: 11    Associated Diagnoses: Aspiration pneumonia (H)      Skin Protectants, Misc. (BALMEX SKIN PROTECTANT) OINT Externally apply topically 2 times daily as needed (irritation) Applay to reddened memo areas twice daily as needed      sodium bicarbonate 650 MG tablet Take 1 tablet (650 mg) by mouth daily  Qty: 30 tablet, Refills: 0    Associated Diagnoses: Hyponatremia      testosterone cypionate (DEPOTESTOTERONE CYPIONATE) 200 MG/ML injection Inject 76 mg into the muscle See Admin Instructions Every 2 weeks on Thursdays  76 mg or 0.38 mL      vitamin D3 (CHOLECALCIFEROL) 2000 units (50 mcg) tablet Take 2,000 Units by mouth daily Crush and feed via j-tube @@ 0900      order for DME Equipment being ordered: Nebulizer  Qty: 1 Units, Refills: 0    Associated Diagnoses: Pneumonia of both lower lobes due to infectious organism (H)       !! - Potential duplicate medications found. Please discuss with provider.        Allergies   Allergies   Allergen Reactions     Dilantin [Phenytoin Sodium]      Scopolamine Hives     Hives in response to scopolamine PATCH      Valproic Acid      Toxicity c bone marrow suspension, elevated ammonia levels

## 2020-05-06 NOTE — PLAN OF CARE
DATE & TIME: 05/05/20 3018-2735     Cognitive Concerns/ Orientation : Alert, answers with thumbs up/yes or no sometimes. Can follow commands.   BEHAVIOR & AGGRESSION TOOL COLOR: Green    ABNL VS/O2: VSS on RA  MOBILITY: Total care, not out of bed. Turn and repositioning q2hrs.   PAIN MANAGMENT: Denies when asked, appears comfortable.   DIET: NPO, per note restart tube feeding this am, no orders in yet.   BOWEL/BLADDER: Incontinent of B/B.  ABNL LAB/BG: Sodium 132, Lactic 2.2, WBC 14.4 (labs drawn last 05/04)  DRAIN/DEVICES: PIV SL  TELEMETRY RHYTHM: N/A  SKIN: Intact, blanchable redness coccyx area, skin intact.  TESTS/PROCEDURES: None scheduled.   D/C DAY/GOALS/PLACE: Possibly today per MD note.   OTHER IMPORTANT INFO: Contact precautions maintained. Dry non-productive cough. Sputum needs to be collected if possible.

## 2020-05-06 NOTE — PLAN OF CARE
DATE & TIME: 05/5/20 1408-2359  Cognitive Concerns/ Orientation : Alert, nonverbal.   BEHAVIOR & AGGRESSION TOOL COLOR: Green  CIWA SCORE: NA  ABNL VS/O2: VSS on RA  MOBILITY:Total care, up with lift. Turn/repo q2h.   PAIN MANAGMENT: Denies  DIET: NPO, receiving meds via GJ tube. TF being held  BOWEL/BLADDER: Incontinent of B/B  ABNL LAB/BG: WBC 14.4 Na 132,   DRAIN/DEVICES: PIV SL, GJ tube (clamped)  TELEMETRY RHYTHM: NA   SKIN: Intact, blanchable redness coccyx area, skin intact, mepilex in place for protection.   TESTS/PROCEDURES:   D/C DAY/GOALS/PLACE: Possible discharge tomorrow 5/6/20  OTHER IMPORTANT INFO: covid-19 negative, Covid precautions discontinued, but continuing contact isolation for hx of MRSA /VRE. IV Antibiotics

## 2020-05-10 LAB
BACTERIA SPEC CULT: NO GROWTH
SPECIMEN SOURCE: NORMAL

## 2020-05-11 LAB
BACTERIA SPEC CULT: NO GROWTH
SPECIMEN SOURCE: NORMAL

## 2020-05-14 ENCOUNTER — HOSPITAL ENCOUNTER (INPATIENT)
Facility: CLINIC | Age: 60
LOS: 3 days | Discharge: HOME HEALTH | DRG: 871 | End: 2020-05-17
Attending: EMERGENCY MEDICINE | Admitting: INTERNAL MEDICINE
Payer: MEDICARE

## 2020-05-14 ENCOUNTER — APPOINTMENT (OUTPATIENT)
Dept: GENERAL RADIOLOGY | Facility: CLINIC | Age: 60
DRG: 871 | End: 2020-05-14
Attending: EMERGENCY MEDICINE
Payer: MEDICARE

## 2020-05-14 DIAGNOSIS — J69.0 ASPIRATION PNEUMONIA OF RIGHT LOWER LOBE DUE TO VOMIT (H): ICD-10-CM

## 2020-05-14 DIAGNOSIS — R65.20 SEVERE SEPSIS (H): ICD-10-CM

## 2020-05-14 DIAGNOSIS — A41.9 SEVERE SEPSIS (H): ICD-10-CM

## 2020-05-14 DIAGNOSIS — G40.803 INTRACTABLE EPILEPSY DUE TO EXTERNAL CAUSES WITH STATUS EPILEPTICUS (H): Primary | ICD-10-CM

## 2020-05-14 DIAGNOSIS — J69.0 ASPIRATION PNEUMONIA OF LOWER LOBE, UNSPECIFIED ASPIRATION PNEUMONIA TYPE, UNSPECIFIED LATERALITY (H): ICD-10-CM

## 2020-05-14 LAB
ALBUMIN SERPL-MCNC: 2.8 G/DL (ref 3.4–5)
ALBUMIN UR-MCNC: 10 MG/DL
ALP SERPL-CCNC: 95 U/L (ref 40–150)
ALT SERPL W P-5'-P-CCNC: 38 U/L (ref 0–70)
ANION GAP SERPL CALCULATED.3IONS-SCNC: 5 MMOL/L (ref 3–14)
APPEARANCE UR: CLEAR
AST SERPL W P-5'-P-CCNC: 22 U/L (ref 0–45)
BASOPHILS # BLD AUTO: 0 10E9/L (ref 0–0.2)
BASOPHILS NFR BLD AUTO: 0.3 %
BILIRUB DIRECT SERPL-MCNC: <0.1 MG/DL (ref 0–0.2)
BILIRUB SERPL-MCNC: 0.3 MG/DL (ref 0.2–1.3)
BILIRUB UR QL STRIP: NEGATIVE
BUN SERPL-MCNC: 15 MG/DL (ref 7–30)
CALCIUM SERPL-MCNC: 8.1 MG/DL (ref 8.5–10.1)
CHLORIDE SERPL-SCNC: 97 MMOL/L (ref 94–109)
CO2 SERPL-SCNC: 28 MMOL/L (ref 20–32)
COLOR UR AUTO: YELLOW
CREAT SERPL-MCNC: 0.74 MG/DL (ref 0.66–1.25)
D DIMER PPP FEU-MCNC: 0.7 UG/ML FEU (ref 0–0.5)
DIFFERENTIAL METHOD BLD: ABNORMAL
EOSINOPHIL # BLD AUTO: 0.2 10E9/L (ref 0–0.7)
EOSINOPHIL NFR BLD AUTO: 1.3 %
ERYTHROCYTE [DISTWIDTH] IN BLOOD BY AUTOMATED COUNT: 13.5 % (ref 10–15)
FERRITIN SERPL-MCNC: 45 NG/ML (ref 26–388)
GFR SERPL CREATININE-BSD FRML MDRD: >90 ML/MIN/{1.73_M2}
GLUCOSE SERPL-MCNC: 116 MG/DL (ref 70–99)
GLUCOSE UR STRIP-MCNC: 300 MG/DL
HCT VFR BLD AUTO: 39.8 % (ref 40–53)
HGB BLD-MCNC: 13.2 G/DL (ref 13.3–17.7)
HGB UR QL STRIP: NEGATIVE
IMM GRANULOCYTES # BLD: 0.1 10E9/L (ref 0–0.4)
IMM GRANULOCYTES NFR BLD: 0.3 %
KETONES UR STRIP-MCNC: NEGATIVE MG/DL
LACTATE BLD-SCNC: 2.6 MMOL/L (ref 0.7–2)
LDH SERPL L TO P-CCNC: 146 U/L (ref 85–227)
LEUKOCYTE ESTERASE UR QL STRIP: NEGATIVE
LIPASE SERPL-CCNC: 292 U/L (ref 73–393)
LYMPHOCYTES # BLD AUTO: 1.7 10E9/L (ref 0.8–5.3)
LYMPHOCYTES NFR BLD AUTO: 10.9 %
MAGNESIUM SERPL-MCNC: 2 MG/DL (ref 1.6–2.3)
MCH RBC QN AUTO: 28.8 PG (ref 26.5–33)
MCHC RBC AUTO-ENTMCNC: 33.2 G/DL (ref 31.5–36.5)
MCV RBC AUTO: 87 FL (ref 78–100)
MONOCYTES # BLD AUTO: 0.9 10E9/L (ref 0–1.3)
MONOCYTES NFR BLD AUTO: 5.8 %
NEUTROPHILS # BLD AUTO: 12.7 10E9/L (ref 1.6–8.3)
NEUTROPHILS NFR BLD AUTO: 81.4 %
NITRATE UR QL: NEGATIVE
NRBC # BLD AUTO: 0 10*3/UL
NRBC BLD AUTO-RTO: 0 /100
PH UR STRIP: 8 PH (ref 5–7)
PLATELET # BLD AUTO: 188 10E9/L (ref 150–450)
POTASSIUM SERPL-SCNC: 3.8 MMOL/L (ref 3.4–5.3)
PROCALCITONIN SERPL-MCNC: 0.15 NG/ML
PROT SERPL-MCNC: 7.4 G/DL (ref 6.8–8.8)
RBC # BLD AUTO: 4.59 10E12/L (ref 4.4–5.9)
RBC #/AREA URNS AUTO: 1 /HPF (ref 0–2)
SARS-COV-2 RNA SPEC QL NAA+PROBE: NORMAL
SODIUM SERPL-SCNC: 130 MMOL/L (ref 133–144)
SOURCE: ABNORMAL
SP GR UR STRIP: 1.02 (ref 1–1.03)
SPECIMEN SOURCE: NORMAL
UROBILINOGEN UR STRIP-MCNC: 2 MG/DL (ref 0–2)
WBC # BLD AUTO: 15.6 10E9/L (ref 4–11)
WBC #/AREA URNS AUTO: <1 /HPF (ref 0–5)

## 2020-05-14 PROCEDURE — 96375 TX/PRO/DX INJ NEW DRUG ADDON: CPT

## 2020-05-14 PROCEDURE — 85379 FIBRIN DEGRADATION QUANT: CPT | Performed by: INTERNAL MEDICINE

## 2020-05-14 PROCEDURE — 83690 ASSAY OF LIPASE: CPT | Performed by: INTERNAL MEDICINE

## 2020-05-14 PROCEDURE — 85379 FIBRIN DEGRADATION QUANT: CPT | Performed by: EMERGENCY MEDICINE

## 2020-05-14 PROCEDURE — 81001 URINALYSIS AUTO W/SCOPE: CPT | Performed by: EMERGENCY MEDICINE

## 2020-05-14 PROCEDURE — 85025 COMPLETE CBC W/AUTO DIFF WBC: CPT | Performed by: EMERGENCY MEDICINE

## 2020-05-14 PROCEDURE — 83615 LACTATE (LD) (LDH) ENZYME: CPT | Performed by: INTERNAL MEDICINE

## 2020-05-14 PROCEDURE — 12000000 ZZH R&B MED SURG/OB

## 2020-05-14 PROCEDURE — 82248 BILIRUBIN DIRECT: CPT | Performed by: INTERNAL MEDICINE

## 2020-05-14 PROCEDURE — 71045 X-RAY EXAM CHEST 1 VIEW: CPT

## 2020-05-14 PROCEDURE — 83605 ASSAY OF LACTIC ACID: CPT | Performed by: EMERGENCY MEDICINE

## 2020-05-14 PROCEDURE — 96365 THER/PROPH/DIAG IV INF INIT: CPT

## 2020-05-14 PROCEDURE — 96361 HYDRATE IV INFUSION ADD-ON: CPT

## 2020-05-14 PROCEDURE — 93005 ELECTROCARDIOGRAM TRACING: CPT

## 2020-05-14 PROCEDURE — 87040 BLOOD CULTURE FOR BACTERIA: CPT | Performed by: EMERGENCY MEDICINE

## 2020-05-14 PROCEDURE — 25000128 H RX IP 250 OP 636: Performed by: EMERGENCY MEDICINE

## 2020-05-14 PROCEDURE — 36415 COLL VENOUS BLD VENIPUNCTURE: CPT

## 2020-05-14 PROCEDURE — 99222 1ST HOSP IP/OBS MODERATE 55: CPT | Mod: AI | Performed by: INTERNAL MEDICINE

## 2020-05-14 PROCEDURE — 87635 SARS-COV-2 COVID-19 AMP PRB: CPT | Performed by: EMERGENCY MEDICINE

## 2020-05-14 PROCEDURE — 83735 ASSAY OF MAGNESIUM: CPT | Performed by: INTERNAL MEDICINE

## 2020-05-14 PROCEDURE — 25000132 ZZH RX MED GY IP 250 OP 250 PS 637: Mod: GY | Performed by: EMERGENCY MEDICINE

## 2020-05-14 PROCEDURE — 83690 ASSAY OF LIPASE: CPT | Performed by: EMERGENCY MEDICINE

## 2020-05-14 PROCEDURE — 82728 ASSAY OF FERRITIN: CPT | Performed by: INTERNAL MEDICINE

## 2020-05-14 PROCEDURE — 80053 COMPREHEN METABOLIC PANEL: CPT | Performed by: INTERNAL MEDICINE

## 2020-05-14 PROCEDURE — 84145 PROCALCITONIN (PCT): CPT | Performed by: INTERNAL MEDICINE

## 2020-05-14 PROCEDURE — 99285 EMERGENCY DEPT VISIT HI MDM: CPT | Mod: 25

## 2020-05-14 PROCEDURE — 25800030 ZZH RX IP 258 OP 636: Performed by: EMERGENCY MEDICINE

## 2020-05-14 RX ORDER — PIPERACILLIN SODIUM, TAZOBACTAM SODIUM 4; .5 G/20ML; G/20ML
4.5 INJECTION, POWDER, LYOPHILIZED, FOR SOLUTION INTRAVENOUS ONCE
Status: COMPLETED | OUTPATIENT
Start: 2020-05-14 | End: 2020-05-14

## 2020-05-14 RX ORDER — VANCOMYCIN HYDROCHLORIDE 1 G/200ML
1000 INJECTION, SOLUTION INTRAVENOUS ONCE
Status: COMPLETED | OUTPATIENT
Start: 2020-05-14 | End: 2020-05-14

## 2020-05-14 RX ORDER — ACETAMINOPHEN 650 MG/1
650 SUPPOSITORY RECTAL
Status: DISCONTINUED | OUTPATIENT
Start: 2020-05-14 | End: 2020-05-17 | Stop reason: HOSPADM

## 2020-05-14 RX ADMIN — PIPERACILLIN AND TAZOBACTAM 4.5 G: 4; .5 INJECTION, POWDER, FOR SOLUTION INTRAVENOUS at 22:37

## 2020-05-14 RX ADMIN — ACETAMINOPHEN 650 MG: 650 SUPPOSITORY RECTAL at 22:37

## 2020-05-14 RX ADMIN — SODIUM CHLORIDE 1000 ML: 9 INJECTION, SOLUTION INTRAVENOUS at 20:49

## 2020-05-14 RX ADMIN — SODIUM CHLORIDE 1000 ML: 9 INJECTION, SOLUTION INTRAVENOUS at 22:23

## 2020-05-14 RX ADMIN — SODIUM CHLORIDE 1000 ML: 9 INJECTION, SOLUTION INTRAVENOUS at 22:54

## 2020-05-14 RX ADMIN — VANCOMYCIN HYDROCHLORIDE 1000 MG: 1 INJECTION, SOLUTION INTRAVENOUS at 22:55

## 2020-05-14 ASSESSMENT — ENCOUNTER SYMPTOMS
FEVER: 1
FATIGUE: 1
VOMITING: 1

## 2020-05-15 LAB
ANION GAP SERPL CALCULATED.3IONS-SCNC: 6 MMOL/L (ref 3–14)
BUN SERPL-MCNC: 12 MG/DL (ref 7–30)
CALCIUM SERPL-MCNC: 7.7 MG/DL (ref 8.5–10.1)
CHLORIDE SERPL-SCNC: 104 MMOL/L (ref 94–109)
CO2 SERPL-SCNC: 23 MMOL/L (ref 20–32)
CREAT SERPL-MCNC: 0.83 MG/DL (ref 0.66–1.25)
ERYTHROCYTE [DISTWIDTH] IN BLOOD BY AUTOMATED COUNT: 13.9 % (ref 10–15)
GFR SERPL CREATININE-BSD FRML MDRD: >90 ML/MIN/{1.73_M2}
GLUCOSE SERPL-MCNC: 85 MG/DL (ref 70–99)
HCT VFR BLD AUTO: 33.6 % (ref 40–53)
HGB BLD-MCNC: 11 G/DL (ref 13.3–17.7)
INTERPRETATION ECG - MUSE: NORMAL
LACTATE BLD-SCNC: 1.4 MMOL/L (ref 0.7–2)
MCH RBC QN AUTO: 28.6 PG (ref 26.5–33)
MCHC RBC AUTO-ENTMCNC: 32.7 G/DL (ref 31.5–36.5)
MCV RBC AUTO: 88 FL (ref 78–100)
PHOSPHATE SERPL-MCNC: 2 MG/DL (ref 2.5–4.5)
PLATELET # BLD AUTO: 173 10E9/L (ref 150–450)
POTASSIUM SERPL-SCNC: 3.9 MMOL/L (ref 3.4–5.3)
RBC # BLD AUTO: 3.84 10E12/L (ref 4.4–5.9)
SARS-COV-2 PCR COMMENT: NORMAL
SARS-COV-2 RNA SPEC QL NAA+PROBE: NEGATIVE
SODIUM SERPL-SCNC: 133 MMOL/L (ref 133–144)
SPECIMEN SOURCE: NORMAL
WBC # BLD AUTO: 9.7 10E9/L (ref 4–11)

## 2020-05-15 PROCEDURE — 84100 ASSAY OF PHOSPHORUS: CPT | Performed by: INTERNAL MEDICINE

## 2020-05-15 PROCEDURE — 25000132 ZZH RX MED GY IP 250 OP 250 PS 637: Mod: GY | Performed by: INTERNAL MEDICINE

## 2020-05-15 PROCEDURE — 83605 ASSAY OF LACTIC ACID: CPT | Performed by: INTERNAL MEDICINE

## 2020-05-15 PROCEDURE — 25000125 ZZHC RX 250: Performed by: INTERNAL MEDICINE

## 2020-05-15 PROCEDURE — 25000128 H RX IP 250 OP 636: Performed by: INTERNAL MEDICINE

## 2020-05-15 PROCEDURE — 94640 AIRWAY INHALATION TREATMENT: CPT | Mod: 76

## 2020-05-15 PROCEDURE — 25800030 ZZH RX IP 258 OP 636: Performed by: INTERNAL MEDICINE

## 2020-05-15 PROCEDURE — 12000000 ZZH R&B MED SURG/OB

## 2020-05-15 PROCEDURE — 40000275 ZZH STATISTIC RCP TIME EA 10 MIN

## 2020-05-15 PROCEDURE — 99232 SBSQ HOSP IP/OBS MODERATE 35: CPT | Performed by: STUDENT IN AN ORGANIZED HEALTH CARE EDUCATION/TRAINING PROGRAM

## 2020-05-15 PROCEDURE — 27210429 ZZH NUTRITION PRODUCT INTERMEDIATE LITER

## 2020-05-15 PROCEDURE — 80048 BASIC METABOLIC PNL TOTAL CA: CPT | Performed by: INTERNAL MEDICINE

## 2020-05-15 PROCEDURE — 36415 COLL VENOUS BLD VENIPUNCTURE: CPT | Performed by: INTERNAL MEDICINE

## 2020-05-15 PROCEDURE — 94640 AIRWAY INHALATION TREATMENT: CPT

## 2020-05-15 PROCEDURE — 85027 COMPLETE CBC AUTOMATED: CPT | Performed by: INTERNAL MEDICINE

## 2020-05-15 RX ORDER — DEXTROSE MONOHYDRATE 100 MG/ML
INJECTION, SOLUTION INTRAVENOUS CONTINUOUS PRN
Status: DISCONTINUED | OUTPATIENT
Start: 2020-05-15 | End: 2020-05-17 | Stop reason: HOSPADM

## 2020-05-15 RX ORDER — PIPERACILLIN SODIUM, TAZOBACTAM SODIUM 3; .375 G/15ML; G/15ML
3.38 INJECTION, POWDER, LYOPHILIZED, FOR SOLUTION INTRAVENOUS EVERY 6 HOURS
Status: DISCONTINUED | OUTPATIENT
Start: 2020-05-15 | End: 2020-05-17 | Stop reason: HOSPADM

## 2020-05-15 RX ORDER — HYDROCORTISONE 20 MG/1
20 TABLET ORAL EVERY MORNING
Status: DISCONTINUED | OUTPATIENT
Start: 2020-05-15 | End: 2020-05-17 | Stop reason: HOSPADM

## 2020-05-15 RX ORDER — GUAR GUM
1 PACKET (EA) ORAL DAILY
Status: DISCONTINUED | OUTPATIENT
Start: 2020-05-15 | End: 2020-05-17 | Stop reason: HOSPADM

## 2020-05-15 RX ORDER — CARBAMAZEPINE 100 MG/5ML
150 SUSPENSION ORAL EVERY 6 HOURS
Status: DISCONTINUED | OUTPATIENT
Start: 2020-05-15 | End: 2020-05-17 | Stop reason: HOSPADM

## 2020-05-15 RX ORDER — METOCLOPRAMIDE HYDROCHLORIDE 5 MG/5ML
5 SOLUTION ORAL 2 TIMES DAILY
Status: DISCONTINUED | OUTPATIENT
Start: 2020-05-15 | End: 2020-05-17 | Stop reason: HOSPADM

## 2020-05-15 RX ORDER — ONDANSETRON 2 MG/ML
4 INJECTION INTRAMUSCULAR; INTRAVENOUS EVERY 6 HOURS PRN
Status: DISCONTINUED | OUTPATIENT
Start: 2020-05-15 | End: 2020-05-17 | Stop reason: HOSPADM

## 2020-05-15 RX ORDER — ASPIRIN 81 MG/1
81 TABLET, CHEWABLE ORAL DAILY
Status: DISCONTINUED | OUTPATIENT
Start: 2020-05-15 | End: 2020-05-17 | Stop reason: HOSPADM

## 2020-05-15 RX ORDER — ALBUTEROL SULFATE 0.83 MG/ML
2.5 SOLUTION RESPIRATORY (INHALATION)
Status: DISCONTINUED | OUTPATIENT
Start: 2020-05-15 | End: 2020-05-17 | Stop reason: HOSPADM

## 2020-05-15 RX ORDER — NALOXONE HYDROCHLORIDE 0.4 MG/ML
.1-.4 INJECTION, SOLUTION INTRAMUSCULAR; INTRAVENOUS; SUBCUTANEOUS
Status: DISCONTINUED | OUTPATIENT
Start: 2020-05-15 | End: 2020-05-17 | Stop reason: HOSPADM

## 2020-05-15 RX ORDER — ACETYLCYSTEINE 200 MG/ML
2 SOLUTION ORAL; RESPIRATORY (INHALATION) 4 TIMES DAILY
Status: DISCONTINUED | OUTPATIENT
Start: 2020-05-15 | End: 2020-05-17 | Stop reason: HOSPADM

## 2020-05-15 RX ORDER — ONDANSETRON 4 MG/1
4 TABLET, ORALLY DISINTEGRATING ORAL EVERY 6 HOURS PRN
Status: DISCONTINUED | OUTPATIENT
Start: 2020-05-15 | End: 2020-05-17 | Stop reason: HOSPADM

## 2020-05-15 RX ORDER — LIDOCAINE 40 MG/G
CREAM TOPICAL
Status: DISCONTINUED | OUTPATIENT
Start: 2020-05-15 | End: 2020-05-17 | Stop reason: HOSPADM

## 2020-05-15 RX ORDER — ACETAMINOPHEN 325 MG/1
650 TABLET ORAL EVERY 4 HOURS PRN
Status: DISCONTINUED | OUTPATIENT
Start: 2020-05-15 | End: 2020-05-17 | Stop reason: HOSPADM

## 2020-05-15 RX ORDER — LEVOTHYROXINE SODIUM 75 UG/1
150 TABLET ORAL EVERY MORNING
Status: DISCONTINUED | OUTPATIENT
Start: 2020-05-15 | End: 2020-05-17 | Stop reason: HOSPADM

## 2020-05-15 RX ORDER — SODIUM CHLORIDE 9 MG/ML
INJECTION, SOLUTION INTRAVENOUS CONTINUOUS
Status: DISCONTINUED | OUTPATIENT
Start: 2020-05-15 | End: 2020-05-15

## 2020-05-15 RX ORDER — HYDROCORTISONE 10 MG/1
10 TABLET ORAL
Status: DISCONTINUED | OUTPATIENT
Start: 2020-05-15 | End: 2020-05-17 | Stop reason: HOSPADM

## 2020-05-15 RX ADMIN — ASPIRIN 81 MG 81 MG: 81 TABLET ORAL at 09:49

## 2020-05-15 RX ADMIN — SODIUM CHLORIDE: 9 INJECTION, SOLUTION INTRAVENOUS at 01:44

## 2020-05-15 RX ADMIN — MINERAL SUPPLEMENT IRON 300 MG / 5 ML STRENGTH LIQUID 100 PER BOX UNFLAVORED 220 MG: at 09:47

## 2020-05-15 RX ADMIN — CARBAMAZEPINE 150 MG: 100 SUSPENSION ORAL at 13:51

## 2020-05-15 RX ADMIN — ACETYLCYSTEINE 2 ML: 200 SOLUTION ORAL; RESPIRATORY (INHALATION) at 16:26

## 2020-05-15 RX ADMIN — ACETYLCYSTEINE 2 ML: 200 SOLUTION ORAL; RESPIRATORY (INHALATION) at 07:50

## 2020-05-15 RX ADMIN — BRIVARACETAM 100 MG: 10 SOLUTION ORAL at 09:47

## 2020-05-15 RX ADMIN — METOCLOPRAMIDE 5 MG: 5 SOLUTION ORAL at 21:12

## 2020-05-15 RX ADMIN — CARBAMAZEPINE 150 MG: 100 SUSPENSION ORAL at 02:39

## 2020-05-15 RX ADMIN — ACETYLCYSTEINE 2 ML: 200 SOLUTION ORAL; RESPIRATORY (INHALATION) at 20:42

## 2020-05-15 RX ADMIN — ACETYLCYSTEINE 2 ML: 200 SOLUTION ORAL; RESPIRATORY (INHALATION) at 11:48

## 2020-05-15 RX ADMIN — CARBAMAZEPINE 150 MG: 100 SUSPENSION ORAL at 07:03

## 2020-05-15 RX ADMIN — PIPERACILLIN AND TAZOBACTAM 3.38 G: 3; .375 INJECTION, POWDER, FOR SOLUTION INTRAVENOUS at 09:51

## 2020-05-15 RX ADMIN — ALBUTEROL SULFATE 2.5 MG: 2.5 SOLUTION RESPIRATORY (INHALATION) at 20:41

## 2020-05-15 RX ADMIN — ALBUTEROL SULFATE 2.5 MG: 2.5 SOLUTION RESPIRATORY (INHALATION) at 07:51

## 2020-05-15 RX ADMIN — PIPERACILLIN AND TAZOBACTAM 3.38 G: 3; .375 INJECTION, POWDER, FOR SOLUTION INTRAVENOUS at 21:12

## 2020-05-15 RX ADMIN — BRIVARACETAM 100 MG: 10 SOLUTION ORAL at 21:12

## 2020-05-15 RX ADMIN — Medication 40 MG: at 12:46

## 2020-05-15 RX ADMIN — ALBUTEROL SULFATE 2.5 MG: 2.5 SOLUTION RESPIRATORY (INHALATION) at 11:43

## 2020-05-15 RX ADMIN — SODIUM CHLORIDE: 9 INJECTION, SOLUTION INTRAVENOUS at 13:52

## 2020-05-15 RX ADMIN — ENOXAPARIN SODIUM 40 MG: 40 INJECTION SUBCUTANEOUS at 09:47

## 2020-05-15 RX ADMIN — HYDROCORTISONE 20 MG: 20 TABLET ORAL at 09:49

## 2020-05-15 RX ADMIN — LEVOTHYROXINE SODIUM 150 MCG: 75 TABLET ORAL at 09:49

## 2020-05-15 RX ADMIN — Medication 1 PACKET: at 09:49

## 2020-05-15 RX ADMIN — CARBAMAZEPINE 150 MG: 100 SUSPENSION ORAL at 18:26

## 2020-05-15 RX ADMIN — HYDROCORTISONE 10 MG: 10 TABLET ORAL at 16:50

## 2020-05-15 RX ADMIN — PIPERACILLIN AND TAZOBACTAM 3.38 G: 3; .375 INJECTION, POWDER, FOR SOLUTION INTRAVENOUS at 15:46

## 2020-05-15 RX ADMIN — METOCLOPRAMIDE 5 MG: 5 SOLUTION ORAL at 09:48

## 2020-05-15 RX ADMIN — ALBUTEROL SULFATE 2.5 MG: 2.5 SOLUTION RESPIRATORY (INHALATION) at 16:26

## 2020-05-15 RX ADMIN — PIPERACILLIN AND TAZOBACTAM 3.38 G: 3; .375 INJECTION, POWDER, FOR SOLUTION INTRAVENOUS at 04:25

## 2020-05-15 ASSESSMENT — ACTIVITIES OF DAILY LIVING (ADL)
ADLS_ACUITY_SCORE: 43

## 2020-05-15 NOTE — PLAN OF CARE
DATE & TIME: 0100-0730, 5/15/2020   Cognitive Concerns/ Orientation : Non-verbal, calm, cooperative   BEHAVIOR & AGGRESSION TOOL COLOR: Green  CIWA SCORE: na   ABNL VS/O2: max temp 99.1, now 97.8. Other VSS on RA  MOBILITY: up with lift  PAIN MANAGMENT: Denies  DIET: NPO, Nutrition consult to restart TF  BOWEL/BLADDER: Incontinent  ABNL LAB/BG: , WBC 15.6, LA 2.6, MD aware, recheck in am  DRAIN/DEVICES: PIV infusing  TELEMETRY RHYTHM: na  SKIN: excoriation/non-blanchable redness on coccyx and R groin/inner thigh. Scattered bruises  TESTS/PROCEDURES: na  D/C DAY/GOALS/PLACE: Pending, ID consult  OTHER IMPORTANT INFO: IV abx. LS diminished.

## 2020-05-15 NOTE — CONSULTS
ID consult dictated IMP 1 58 yo male recurrent sepsis, very likely asp despite no infiltrate/hypoxia    REC zosyn alone await cxs, follow clinically, not necc R orgs just because was on augmemntin

## 2020-05-15 NOTE — CONSULTS
Consult Date:  05/15/2020      INFECTIOUS DISEASE CONSULTATION      LOCATION:  Room 623, [facility].      REFERRING PHYSICIAN:  Sam Vicente MD      IMPRESSION:   1.  A 59-year-old male, very well known to our group and [facility] in general, who has had 25 admissions in the last 17 months, several which we have been involved with.  He is currently with another episode of apparent acute sepsis.  Numerous prior similar episodes have turned out to be aspiration either with or without bacterial component of pneumonia.   2.  Prior history of numerous well-documented aspiration events, has had Pseudomonas colonization in the past, MRSA as well.  Cultures have been variable, historically at times piperacillin resistant, but is frequently gotten better on Zosyn anyway.   3.  History of traumatic brain injury, chronic aphasia and spastic paralysis.   4.  Chronic seizure disorder.   5.  MRSA and VRE colonization.   6.  Minimal respiratory symptoms.  Lives in a group home.  Current COVID-19 negative and as recently as 05/04 also negative, does not really fit clinically.      RECOMMENDATIONS:   1.  Agree well conceivably MRSA could be involved.  Hold for now.  Zosyn alone, even though he has had resistance in the past.  Very frequently, these episodes will rapidly get better, probably unrelated to antibiotics, likely chemical aspiration alone.  Of note, his procalcitonin is relatively low.  Lactic acid initially elevated as was white count, now down.   2.  Await current cultures and follow clinically.  If he rapidly improves, would not even possibly object to going back to Augmentin alone, probably did not really feel that microbiologically, simply another aspiration event.  In the past even with his aspiration events, he has not necessarily had that much in the way of respiratory symptoms.   3.  Follow closely for other localizing problems and if fever not resolving, expand workup.      HISTORY:  This  59-year-old male is well known to us.  He has had numerous admissions over a long period of time and specifically 25 admissions in the last 2 years.  These frequently had been very similar to the current episode with acute major fever.  At times, these have rapidly resolved, sometimes without even giving antibiotics.  Often with antibiotics, it has been perceived that these are almost all aspiration events, many of which have been chemical and not bacterial; however, he has had numerous positive cultures historically including MRSA, VRE, and fairly resistant Pseudomonas, both in the sputum and other locations.  Current cultures are now pending.  He, of course, cannot give any significant history, but no history of any new localizing events.      PAST MEDICAL HISTORY:  See the prior detailed records, history of traumatic brain injury and seizure disorder, some wound history but has not been a recent major issues.  History of documented aspiration and significant pneumonia historically.      SOCIAL AND FAMILY HISTORY:  Known methicillin-resistant Staphylococcus aureus and VRE and relatively resistant Pseudomonas at times in the past.  Lives in a group home.      MEDICATIONS:  As listed.      REVIEW OF SYSTEMS:  Unobtainable.      PHYSICAL EXAMINATION:   GENERAL:  The patient looks like his usual self.  He is not currently requiring oxygen.  Not tachycardic.   VITAL SIGNS:  Temperature is down from the earlier 103 to normal.   HEENT:  No visible lesions.   NECK:  Nontender.   HEART:  Fairly unremarkable.  Good air movement, not hypoxic.   ABDOMEN:  Nontender.   EXTREMITIES:  Neurologic findings of his known paraplegia.      LABORATORY DATA:  White count initially 15,600.  Procalcitonin 0.15.  Blood cultures pending.  COVID-19 negative.  Chest x-ray both now and on 05/04:  Minimal atelectasis at the bases.  No obvious pneumonia.      Thank you very much for consultation.  I will follow the patient with you.          SAMEER MITTAL MD             D: 05/15/2020   T: 05/15/2020   MT: ROSALES      Name:     BERT BARAJAS   MRN:      [id number]        Account:       [id number]   :      1960           Consult Date:  05/15/2020      Document: [id number]

## 2020-05-15 NOTE — CONSULTS
CLINICAL NUTRITION SERVICES  -  ASSESSMENT NOTE    RECOMMENDATIONS FOR MD/PROVIDER TO ORDER:   Recommend add NeutraPhos TID per home regimen.    Recommendations Ordered by Registered Dietitian (RD):   - Noted PTA vomiting. If pt unable to tolerate EN at goal, OK to run continuous (50 ml/hr x 24 hours)    Recommend TF as follows (using formulary equivalent):     Type of Feeding Tube: JT (chronic, last replaced in Jan 2020)    Enteral Frequency:  Cycled during the day    Enteral Regimen: Isosource 1.5 at 100 mL/hr x 12 hours    Run 8 am - 8 pm    Total Enteral Provisions:     1200 mL provides 1800 kcal, 82 gm protein, 211 gm CHO, 18 gm fiber and 912 mL H20.    Add 1 pkt NutriSource Fiber for 3 g additional fiber    Meets > 100% of DRI's.    Free water flush of 60 mL q4 hours, increase to 200 mL q4 hours once IVF off    Add 60 ml before and after each cycle      Daily electrolyte check, Phos add on    Daily weights    OK to start at goal     Malnutrition:   % Weight Loss:  None noted  % Intake:  Decreased intake does not meet criteria for malnutrition   Subcutaneous Fat Loss:  Deferred  Muscle Loss:  Deferred  Fluid Retention:  None noted    Malnutrition Diagnosis: Unable to determine due to lack of NFPE due to departmental precautions to prevent spread of COVID19 during Pandemic.      REASON FOR ASSESSMENT  Keyon Farias is a 59 year old male seen by Registered Dietitian for Admission Nutrition Risk Screen for tube feeding or parenteral nutrition and Provider Order - Registered Dietitian to Assess and Order TF per Medical Nutrition Therapy Protocol    NUTRITION HISTORY  - Information obtained from Chart Review. Pt not appropriate for phone call given baseline cognitive status.     - Patient well-known to this service.   - Admitted with vomiting, fever.  - Most recent admission just 10 days PTA.   - Receives enteral nutrition via JT to meet 100% estimated needs.     - Per last full nutrition assessment dated 3/24 -->  "  \"- Home EN regimen has been running as follows:               - Via JT, HOB elevated              - Jevity 1.5 @ 90 - 100 ml/hr x 11 hours (run 5 am-4 pm)  - Savannah reports that she feeds Keyon two hours on, one hour off.               - flush 60 ml each time feeding is turned on or off, with additional 9393-5659 ml free water daily.\"    - Patient also receives NeutraPhos TID.     CURRENT NUTRITION ORDERS  Diet Order:     NPO - baseline    Current Intake/Tolerance:  N/A  Vomiting PTA     NUTRITION FOCUSED PHYSICAL ASSESSMENT FOR DIAGNOSING MALNUTRITION)  No:  Deferred due to departmental precautions to prevent spread of COVID19 during Pandemic.             Observed:    Deferred    Obtained from Chart/Interdisciplinary Team:  Ricci - Nutrition 2; Total 13  Last BM not recorded   No edema     ANTHROPOMETRICS  Height: 6'   Weight: 157 lbs 6.54 oz (71.4 kg)  Body mass index is 21.35 kg/m .  Weight Status:  Normal BMI  IBW: 80.9 kg  % IBW: 88%  Weight History: Wt consistent with wt measured in Feb 2020, and early Jan 2020. Suspect wt fluctuates with fluid status.   Wt Readings from Last 10 Encounters:   05/15/20 71.4 kg (157 lb 6.5 oz)   05/06/20 74.2 kg (163 lb 8 oz)   03/26/20 74.8 kg (165 lb)   02/21/20 70.4 kg (155 lb 3.3 oz)   02/12/20 72.9 kg (160 lb 11.5 oz)   01/30/20 74.8 kg (165 lb)   01/29/20 74.8 kg (165 lb)   01/02/20 71.7 kg (158 lb)   12/25/19 75.1 kg (165 lb 9.1 oz)   12/18/19 76.7 kg (169 lb)       LABS  Labs reviewed  Na 130 (L)    MEDICATIONS  Medications reviewed  Elemental iron 60 mg daily   Reglan - gut motility   NaCl IVF @ 100 ml/hr     ASSESSED NUTRITION NEEDS PER APPROVED PRACTICE GUIDELINES:  Dosing Weight 71.4 kg  Estimated Energy Needs: 5976-5642 kcals (20-25 Kcal/Kg)  Justification: maintenance with limited mobility  Estimated Protein Needs:  grams protein (1.2-1.5 g pro/Kg)  Justification: preservation of lean body mass  Estimated Fluid Needs: > 1 mL/kcal  Justification: " maintenance    MALNUTRITION:  % Weight Loss:  None noted  % Intake:  Decreased intake does not meet criteria for malnutrition   Subcutaneous Fat Loss:  Deferred  Muscle Loss:  Deferred  Fluid Retention:  None noted    Malnutrition Diagnosis: Unable to determine due to lack of NFPE due to departmental precautions to prevent spread of COVID19 during Pandemic.     NUTRITION DIAGNOSIS:  Inadequate protein-energy intake related to interruption to EN regimen w/ admission and likely with PTA emesis as evidenced by no EN since admission.       NUTRITION INTERVENTIONS  Recommendations / Nutrition Prescription  Recommend TF as follows (using formulary equivalent):     Type of Feeding Tube: JT (chronic, last replaced in Jan 2020)    Enteral Frequency:  Cycled during the day    Enteral Regimen: Isosource 1.5 at 100 mL/hr x 12 hours    Run 8 am - 8 pm    Total Enteral Provisions:     1200 mL provides 1800 kcal, 82 gm protein, 211 gm CHO, 18 gm fiber and 912 mL H20.    Add 1 pkt NutriSource Fiber for 3 g additional fiber    Meets > 100% of DRI's.    Free water flush of 60 mL q4 hours, increase to 200 mL q4 hours once IVF off    Add 60 ml before and after each cycle      Daily electrolyte check, Phos add on    Daily weights    OK to start at goal    Recommend add NeutraPhos TID per home regimen.       Implementation  Nutrition education: Not appropriate at this time due to patient condition  EN Composition, EN Schedule and Feeding Tube Flush: ordered + recs as above.     Nutrition Goals  EN to provide % est needs.     MONITORING AND EVALUATION:  Progress towards goals will be monitored and evaluated per protocol and Practice Guidelines      Marisel Fernández RD, LD  Pager: [phone]

## 2020-05-15 NOTE — PROGRESS NOTES
RECEIVING UNIT ED HANDOFF REVIEW    ED Nurse Handoff Report was reviewed by: Maria Del Carmen Whatley RN on May 14, 2020 at 11:33 PM

## 2020-05-15 NOTE — PROGRESS NOTES
RECEIVING UNIT ED HANDOFF REVIEW    ED Nurse Handoff Report was reviewed by: Isela Curry RN on May 15, 2020 at 12:20 AM

## 2020-05-15 NOTE — PROGRESS NOTES
Summary:     DATE & TIME:, 5/15/2020 7-330  Cognitive Concerns/ Orientation : mainly Non-verbal, calm, cooperative can give thumbs up, shake head no most of time  BEHAVIOR & AGGRESSION TOOL COLOR: Green  CIWA SCORE: na   ABNL VS/O2: VSS on RA  MOBILITY: up with lift  PAIN MANAGMENT: Denies  DIET:  TF@ 100 to peg tube  BOWEL/BLADDER: Incontinent both  ABNL LAB/BG: lactic acid normalized(was elevated on admit)  DRAIN/DEVICES: PIV infusing NS@100  TELEMETRY RHYTHM: na  SKIN: excoriation/non-blanchable redness on coccyx and R groin/inner thigh. Scattered bruises  TESTS/PROCEDURES: na  D/C DAY/GOALS/PLACE: likely back home tomorrow  OTHER IMPORTANT INFO: IV abx. LS diminished.

## 2020-05-15 NOTE — PROGRESS NOTES
[facility]    Medicine Progress Note - Hospitalist Service       Date of Admission:  5/14/2020  Date of Service: 05/15/2020    Assessment & Plan     Keyon Farias is a 59 year old male with PMH of recurrent aspiration pneumonia, history of cardiac arrest and TBI, nonverbal, panhypopituitarism, seizure disorder, who presents with sepsis, unclear etiology, possibly aspiration pneumonia.    Sepsis, unclear etiology, suspect aspiration pneumonia  COVID-19 - NEGATIVE  Elevated lactic acid - Normalized  AssessPatient has been hospitalized twenty five times since January 2019, the vast majority for aspiration pneumonia or pneumonitis, last hospitalized 5/4 to 5/6/2020 and was discharged on Augmentin for 7 days. Mother Savannah who is his caretaker and guardian reports that he was taking Augmentin through this morning. Patient nevertheless developed a fever and vomiting. CXR shows a hazy right lower lobe opacity. Noted fever of 103.2F, tachycardic to 125 bpm, elevated WBC and lactate, all consistent with sepsis. It's unclear to me how patient could have developed recurrent aspiration pneumonia while still taking Augmentin. Review of past hospitalizations suggest that he has Pseudomonas colonization, so will cover for Pseudomonas. Will also look for other sources of sepsis. COVID-19 collected negative.   Plan:  - Zosyn alone for now  - ID following  - PTA PPI    Seizure disorder: PTA tegretol, brivaracetam    Panhypopituitarism: PTA levothyroxine, hydrocortisone    TBI, splastic hemiplegia, severe dysphagia: Has g tube, does not take any PO  - Nutrition consult for tube feeds, resumed today  - PTA acetylcysteine, albuterol scheduled  - PTA ASA  - PTA iron    DVT Prophylaxis: Enoxaparin (Lovenox) SQ  Code Status: Full Code    Disposition: Expected discharge in 1-2 days pending finalized abx plan. Improving, possibly could discharge tomorrow.     ZEYAD BURGOS MD       Diet: Adult Formula Drip Feeding: Continuous  Isosource 1.5; Jejunostomy; Goal Rate: 100; mL/hr; From: 8:00 AM; 8:00 PM; Medication - Feeding Tube Flush Frequency: At least 15-30 mL water before and after medication administration and with tube clogging;...    DVT Prophylaxis: Pneumatic Compression Devices  Lerma Catheter: not present  Code Status: Full Code           Disposition Plan   Expected discharge: Tomorrow, recommended to prior living arrangement once antibiotic plan established.  Entered: Ricky Torres MD 05/15/2020, 3:37 PM       The patient's care was discussed with the Bedside Nurse and Patient.    Ricky Torres MD  Hospitalist Service  [facility]    ______________________________________________________________________    Interval History     Non-verbal  Gave me thumbs up  Denies pain    Data reviewed today: I reviewed all medications, new labs and imaging results over the last 24 hours. I personally reviewed no images or EKG's today.    Physical Exam   Vital Signs: Temp: 98.2  F (36.8  C) Temp src: Axillary BP: 108/55 Pulse: 77 Heart Rate: 82 Resp: 18 SpO2: 95 % O2 Device: None (Room air)    Weight: 157 lbs 6.54 oz    Constitutional: awake, alert, cooperative, no apparent distress.  Hematologic / Lymphatic: no cervical lymphadenopathy   Respiratory: CTABL   Cardiovascular: RRR with no m/r/g   GI: Normal bowel sounds, soft, non-distended, non-tender.  Neurologic: Awake, alert.  Neuropsychiatric: normal mood and affect    Data   Recent Labs   Lab 05/15/20  0942 05/14/20  2222 05/14/20 2031   WBC 9.7  --  15.6*   HGB 11.0*  --  13.2*   MCV 88  --  87     --  188    130*  --    POTASSIUM 3.9 3.8  --    CHLORIDE 104 97  --    CO2 23 28  --    BUN 12 15  --    CR 0.83 0.74  --    ANIONGAP 6 5  --    STEVE 7.7* 8.1*  --    GLC 85 116*  --    ALBUMIN  --  2.8*  --    PROTTOTAL  --  7.4  --    BILITOTAL  --  0.3  --    ALKPHOS  --  95  --    ALT  --  38  --    AST  --  22  --    LIPASE  --  292  --      Recent Results (from the  past 24 hour(s))   XR Chest Port 1 View    Narrative    CHEST ONE VIEW PORTABLE   5/14/2020 9:29 PM     HISTORY:  Fever.    COMPARISON: 5/4/2020.      Impression    IMPRESSION: Shallow inspiration. Elevation of the right hemidiaphragm.  Hazy opacity at the right lung base could be related to atelectasis or  pneumonia. The left lung is clear. No pneumothorax.    LEANDRA RIVERA MD     Medications     dextrose         sodium chloride 0.9%  1,000 mL Intravenous Once     acetylcysteine  2 mL Nebulization 4x Daily     albuterol  2.5 mg Nebulization Q4H While awake     aspirin  81 mg Oral or Feeding Tube Daily     Brivaracetam  100 mg Oral or Feeding Tube BID     carBAMazepine  150 mg Oral or Feeding Tube Q6H     enoxaparin ANTICOAGULANT  40 mg Subcutaneous Q24H     ferrous sulfate  220 mg Per Feeding Tube Daily     fiber modular (NUTRISOURCE FIBER)  1 packet Per Feeding Tube Daily     hydrocortisone  10 mg Oral or Feeding Tube Daily with supper     hydrocortisone  20 mg Oral or Feeding Tube QAM     levothyroxine  150 mcg Oral QAM     metoclopramide  5 mg Per Feeding Tube BID     pantoprazole  40 mg Per J Tube Daily     piperacillin-tazobactam  3.375 g Intravenous Q6H     sodium chloride (PF)  3 mL Intracatheter Q8H
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
## 2020-05-15 NOTE — PHARMACY-ADMISSION MEDICATION HISTORY
Pharmacy Medication History  Admission medication history interview status for the 5/14/2020  admission is complete. See EPIC admission navigator for prior to admission medications     Medication history sources: Patient's family/friend (Mother Savannah)  Medication history source reliability: Good  Adherence assessment: Good    Significant changes made to the medication list:  Added Vitamin C.    Medication reconciliation completed by provider prior to medication history? No    Time spent in this activity: 30 minutes      Prior to Admission medications    Medication Sig Last Dose Taking? Auth Provider   acetylcysteine (MUCOMYST) 20 % neb solution Take 2 mLs by nebulization 4 times daily With albuterol at 0700, 1100, 1500, and 1900  5/14/2020 at 1500 Yes Unknown, Entered By History   albuterol (PROVENTIL) (5 MG/ML) 0.5% neb solution Take 2.5 mg by nebulization every 4 hours (while awake) 0700 1100 1500 1900 with mucomyst  5/14/2020 at 1500 Yes Unknown, Entered By History   aspirin (ASA) 81 MG chewable tablet 81 mg by Oral or Feeding Tube route daily At 0900 5/14/2020 at 0900 Yes Unknown, Entered By History   bacitracin ointment Apply topically daily as needed for wound care To PEG site.  5/14/2020 at Unknown time Yes Unknown, Entered By History   Bioflavonoid Products (VITAMIN C PLUS) 1000 MG TABS 1 tablet by Oral or FT or NG tube route daily  5/14/2020 at Unknown time Yes Unknown, Entered By History   Brivaracetam (BRIVIACT) 10 MG/ML solution 100 mg by Oral or Feeding Tube route 2 times daily 0900, 2100 5/14/2020 at 0900 Yes Unknown, Entered By History   calcium carbonate 1250 (500 CA) MG/5ML SUSP suspension 5 mLs (1,250 mg) by Per J Tube route 3 times daily (with meals) 5/14/2020 at 1500 Yes Mariana Venegas MD   carBAMazepine (TEGRETOL) 100 MG/5ML suspension 150 mg by Oral or Feeding Tube route every 6 hours At 06:00, 12:00, 18:00 and 24:00 for seizures  5/14/2020 at 1800 Yes Unknown, Entered By History    ferrous sulfate 220 (44 Fe) MG/5ML ELIX 220 mg by Per Feeding Tube route daily 5/14/2020 at 0900 Yes Unknown, Entered By History   Guar Gum (FIBER MODULAR, NUTRISOURCE FIBER,) packet 1 packet by Per G Tube route daily 5/14/2020 at 0900 Yes Starr Champion MD   hydrocortisone (CORTEF) 5 MG tablet 10 mg by Oral or FT or NG tube route daily (with dinner) At 1500 5/14/2020 at 1500 Yes Unknown, Entered By History   hydrocortisone (CORTEF) 5 MG tablet 20 mg by Oral or FT or NG tube route every morning  5/14/2020 at 0900 Yes Unknown, Entered By History   hydrocortisone 1 % CREA cream Place rectally 2 times daily as needed for other Apply to reddened memo areas as needed Past Week at Unknown time Yes Unknown, Entered By History   levothyroxine (SYNTHROID/LEVOTHROID) 150 MCG tablet Take 150 mcg by mouth every morning 5/14/2020 at 0500 Yes Unknown, Entered By History   melatonin (MELATONIN) 1 MG/ML LIQD liquid 6 mg by Per NG tube route At Bedtime  5/13/2020 at HS Yes Unknown, Entered By History   metoclopramide (REGLAN) 5 MG/5ML solution 5 mg by Per Feeding Tube route 2 times daily 5/14/2020 at AM Yes Unknown, Entered By History   miconazole (MICATIN) 2 % AERP powder Apply topically 2 times daily as needed  5/13/2020 at Unknown time Yes Unknown, Entered By History   mupirocin (BACTROBAN) 2 % external ointment Apply topically 2 times daily as needed  5/14/2020 at Unknown time Yes Reported, Patient   pantoprazole (PROTONIX) 2 mg/mL SUSP suspension 20 mLs (40 mg) by Per J Tube route daily 5/14/2020 at 0900 Yes Washington Connors MD   potassium & sodium phosphates (NEUTRA-PHOS) 280-160-250 MG Packet Take 1 packet by mouth 3 times daily  5/14/2020 at 1500 Yes Yanely Liriano MD   Scopolamine HBr POWD Dispense #90. Mix contents with small amount of water for admin via J-tube.  Administer 0.8 mg three times each day. 5/14/2020 at 1500 Yes Jennie Bermudez MD   Skin Protectants, Misc. (BALMEX SKIN PROTECTANT) OINT  Externally apply topically 2 times daily as needed (irritation) Applay to reddened memo areas twice daily as needed 5/14/2020 at Unknown time Yes Unknown, Entered By History   sodium bicarbonate 650 MG tablet Take 1 tablet (650 mg) by mouth daily 5/14/2020 at 0900 Yes Ricky Torres MD   testosterone cypionate (DEPOTESTOTERONE CYPIONATE) 200 MG/ML injection Inject 76 mg into the muscle See Admin Instructions Every 2 weeks on Thursdays  76 mg or 0.38 mL 5/8/2020 Yes Unknown, Entered By History   vitamin C (ASCORBIC ACID) 250 MG TABS tablet 1,000 mg by Per Feeding Tube route daily 5/14/2020 at 0900 Yes Unknown, Entered By History   vitamin D3 (CHOLECALCIFEROL) 2000 units (50 mcg) tablet Take 2,000 Units by mouth daily Crush and feed via j-tube @@ 0900 5/14/2020 at 0900 Yes Unknown, Entered By History   order for DME Equipment being ordered: Nebulizer   Starr Champion MD Sharon Chandler, PharmD, BCPS

## 2020-05-15 NOTE — ED NOTES
[facility]  ED Nurse Handoff Report    ED Chief complaint: Fever      ED Diagnosis:   Final diagnoses:   Aspiration pneumonia of right lower lobe due to vomit (H)   Severe sepsis (H)       Code Status: Full Code    Allergies:   Allergies   Allergen Reactions     Dilantin [Phenytoin Sodium]      Scopolamine Hives     Hives in response to scopolamine PATCH      Valproic Acid      Toxicity c bone marrow suspension, elevated ammonia levels        Patient Story: fever  Focused Assessment:  59 year old male who was BIBA for evaluation of a fever and vomiting. EMS states had one episode of emesis today and increasing lethargy. Of note, patient tested negative for COVID during an admission for fever 10 days ago.  Patient has a history of traumatic brain injury is nonverbal and lives with his mother.  He has 2 nurses who come into the home and they have been the same nurses.  There is been no COVID positive exposures.  History is obtained from his mom Savannah who he lives with and is his guardian.  She states that over the last week he has had 1 or 2 times that he has vomited bile.  Yesterday he vomited significant amount of white phlegm.  Today this evening he had one episode of vomiting which appeared to be stomach contacts to her.  He does have a PEG tube in place.  And following this he did have a spike of fever to 103.  He has had some increased lethargy according to mom as well.  Patient was just discharged on May 4 from the hospital for aspiration pneumonitis.  He was on Augmentin which he finished yesterday per mom.  She states no history of UTIs although he has had a history of pancreatitis in the past as well.  There have been no sick contacts at home.    Treatments and/or interventions provided: bolus x2, zosyn, vanco, tylenol  Patient's response to treatments and/or interventions: stable    To be done/followed up on inpatient unit:  monitor    Does this patient have any cognitive concerns?: Hx  Head Trauma    Activity level - Baseline/Home:  Total Care  Activity Level - Current:   Wheelchair    Patient's Preferred language: English   Needed?: No    Isolation: None and Contact   Infection: Not Applicable  MRSA, VRE, r/o covid  Bariatric?: No    Vital Signs:   Vitals:    05/14/20 2015   BP: 93/69   Pulse: 125   Resp: 28   Temp: 103.2  F (39.6  C)   TempSrc: Oral   SpO2: 93%       Cardiac Rhythm:     Was the PSS-3 completed:   No -x  What interventions are required if any?               Family Comments: x  OBS brochure/video discussed/provided to patient/family: N/A              Name of person given brochure if not patient: c              Relationship to patient: c    For the majority of the shift this patient's behavior was Green.   Behavioral interventions performed were x.    ED NURSE PHONE NUMBER: x

## 2020-05-15 NOTE — ED PROVIDER NOTES
History     Chief Complaint:  Fever      The history is provided by the EMS personnel and a caregiver.      Keyon Farias is a 59 year old male who was BIBA for evaluation of a fever and vomiting. EMS states had one episode of emesis today and increasing lethargy. Of note, patient tested negative for COVID during an admission for fever 10 days ago.  Patient has a history of traumatic brain injury is nonverbal and lives with his mother.  He has 2 nurses who come into the home and they have been the same nurses.  There is been no COVID positive exposures.  History is obtained from his mom Savannah who he lives with and is his guardian.  She states that over the last week he has had 1 or 2 times that he has vomited bile.  Yesterday he vomited significant amount of white phlegm.  Today this evening he had one episode of vomiting which appeared to be stomach contacts to her.  He does have a PEG tube in place.  And following this he did have a spike of fever to 103.  He has had some increased lethargy according to mom as well.  Patient was just discharged on May 4 from the hospital for aspiration pneumonitis.  He was on Augmentin which he finished yesterday per mom.  She states no history of UTIs although he has had a history of pancreatitis in the past as well.  There have been no sick contacts at home.    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)   DVT of upper extremity (deep vein thrombosis)   Gastro-oesophageal reflux disease   Panhypopituitarism    Pneumonia   Seizures   Spastic hemiplegia affecting dominant side   Thyroid disease   Tracheostomy care    Traumatic brain injury   Unspecified cerebral artery occlusion with cerebral infarction   UTI (urinary tract infection)  Ventricular fibrillation    Ventricular tachyarrhythmia      Past Surgical History:    Endoscopic US upper GI  Endoscopic US Esophagogastroduodenoscopy  Esophagogastroduodenoscopy x3  Reconstructive facial surgery following accident in 1989  Gastro jejunostomy tube chance x6  Picc Exchange left  Laparoscopic appendectomy  Laparoscopic assisted insertion tube gastrotomy  Right head repair  Tracheostomy x2  Vascular surgery     Family History:    Father - Cancer    Social History:  Tobacco use: former smoker - quit 31 years ago  Alcohol use: No  Drug use: no  Marital Status:  Single [1]       Review of Systems   Unable to perform ROS: Patient nonverbal   Constitutional: Positive for fatigue and fever.   Gastrointestinal: Positive for vomiting (with bile and white phlegm).     Remainder of systems reviewed and negative    Physical Exam     Patient Vitals for the past 24 hrs:   BP Temp Temp src Pulse Heart Rate Resp SpO2   05/14/20 2015 93/69 103.2  F (39.6  C) Oral 125 125 28 93 %       Physical Exam  General: Patient is alert and nonverbal.  HEENT: Head atraumatic    Eyes: pupils equal and reactive. Conjunctiva clear   Nares: patent   Oropharynx: no lesions, uvula midline, no palatal draping, normal voice, no trismus  Neck: Supple without lymphadenopathy, no meningismus  Chest: Tachycardic but regular  Lungs: Equal clear to auscultation with no wheeze or rales  Abdomen: Soft, PEG tube in place with area without erythema or warmth surrounding, non tender, nondistended, normal bowel sounds  Back: No costovertebral angle tenderness, no midline C, T or L spine tenderness  Neuro: Contracted laying on his right side, at baseline per mom  Extremities: No deformities, equal radial and DP pulses. No clubbing, cyanosis.  No edema  Skin: Warm and dry with no rash.       Emergency Department Course     ECG:  Indication: Chest Pain Equivalent  Time: 2033  Vent. Rate 122 bpm. VT interval 156. QRS duration 82. QT/QTc 312/444. P-R-T axis 60 -46 52.    Sinus  tachycardia, left anterior fascicular block  Abnormal ECG. When compared to EKG dated 08/16/2019, QRS axis shifted left. Read time: 2035     Imaging:  Radiology findings were communicated with the patient and family who voiced understanding of the findings.    XR Chest Port 1 View  IMPRESSION: Shallow inspiration. Elevation of the right hemidiaphragm.  Hazy opacity at the right lung base could be related to atelectasis or  pneumonia. The left lung is clear. No pneumothorax.  Reading per Radiology    Laboratory:  Laboratory findings were communicated with the patient and family who voiced understanding of the findings.    CBC: WBC: 15.6 (high), HGB: 13.2 (low), PLT: 188  Lactic acid (Resulted at 2031): 2.6 (high)    CMP: pending  Lipase: pending  Magnesium: pending    UA with Microscopic: Glucose: 300 (A), pH: 8.0 (high), Protein Albumin: 10 (A), o/w WNL    Blood cultures (x2): pending   COVID-19 VIRUS PCR: pending    Interventions:  2049 NS 1L IV    Medications   sodium chloride (PF) 0.9% PF flush 3 mL (has no administration in time range)   sodium chloride (PF) 0.9% PF flush 3 mL (has no administration in time range)   0.9% sodium chloride BOLUS (has no administration in time range)   piperacillin-tazobactam (ZOSYN) 4.5 g vial to attach to  mL bag (has no administration in time range)   vancomycin (VANCOCIN) 1000 mg in dextrose 5% 200 mL PREMIX (has no administration in time range)   acetaminophen (TYLENOL) Suppository 650 mg (has no administration in time range)   0.9% sodium chloride BOLUS (has no administration in time range)   0.9% sodium chloride BOLUS (has no administration in time range)   0.9% sodium chloride BOLUS (1,000 mLs Intravenous New Bag 5/14/20 2049)       Emergency Department Course:  Past medical records, nursing notes, and vitals reviewed.    2025 I performed an exam of the patient as documented above.     EKG obtained in the ED, see results above.   IV was inserted and blood was drawn for  laboratory testing, results above.  The patient provided a urine sample here in the emergency department. This was sent for laboratory testing, findings above.  The patient was sent for a Chest XR while in the emergency department, results above.     2215 I rechecked the patient and mother and discussed the results of his workup thus far including plans for admission    Findings and plan explained to the Patient and mother who consents to admission. Discussed the patient with Dr. Vicente, Hospitalist, who will admit the patient to an observation bed for further monitoring, evaluation, and treatment.    I personally reviewed the laboratory and imaging results with the Patient and mother and answered all related questions prior to admission.     Impression & Plan     The patient has signs of Severe Sepsis REMINDER: Please use septic shock SmartPhrase for Lactate > 4 or a patient requiring vasopressors after initial fluid bolus (meaning persistent hypotension)      If one the following conditions is present, a 30 mL/kg bolus is recommended as part of the 6 hour bundle (IBW can be used for BMI >30, or document refusal/contraindication):      1.   Initial hypotension  defined as 2 bps < 90 or map < 65 in the 6hrs before or 6hrs after time zero.     2.  Lactate >4.     The patient has signs of Severe Sepsis as evidenced by:    1. 2 SIRS criteria, AND  2. Suspected infection, AND   3. Organ dysfunction:  SBP <90, MAP < 65, or SBP decrease of >40 from baseline due to infection and Lactic Acid > 2.0    Time severe sepsis diagnosis confirmed: 2031 05/14/20 as this was the time when SBP <90 or MAP <65 and Lactate resulted, and the level was > 2.0    3 Hour Severe Sepsis Bundle Completion:    1. Initial Lactic Acid Result:   Recent Labs   Lab Test 05/14/20 2031 05/04/20 2109 03/23/20  2255   LACT 2.6* 2.2* 2.9*     2. Blood Cultures before Antibiotics: Yes  3. Broad Spectrum Antibiotics Administered:  yes       Anti-infectives  (From admission through now)    Start     Dose/Rate Route Frequency Ordered Stop    05/14/20 2106  piperacillin-tazobactam (ZOSYN) 4.5 g vial to attach to  mL bag      4.5 g  over 30 Minutes Intravenous ONCE 05/14/20 2105 05/14/20 2106  vancomycin (VANCOCIN) 1000 mg in dextrose 5% 200 mL PREMIX      1,000 mg  200 mL/hr over 1 Hours Intravenous ONCE 05/14/20 2105            4. Fluid volume administered in ED:  Full 30 mL/kg bolus given (see amount below).    BMI Readings from Last 1 Encounters:   05/06/20 22.17 kg/m      30 mL/kg fluids based on weight: 2,230 mL  30 mL/kg fluids based on IBW (must be >= 60 inches tall): 2,330 mL                     Medical Decision Making:  Patient is a 59-year-old male who presents the emergency department for fever, lethargy, intermittent vomiting.  Patient has history of frequent aspiration pneumonia and pneumonitis.  He was just discharged on the fourth from this facility with Augmentin for aspiration pneumonitis.  Patient reportedly finished Augmentin yesterday or the day before per his mother who he lives with.  Patient had one episode of vomiting today.  He was found to have a fever of 103.2 on arrival and was tachycardic.  His tachycardia improved with fluid hydration.  Severe sepsis protocol was undertaken and his lactic acid was noted to be elevated.  Broad-spectrum antibiotics were provided.  Chest x-ray reveals evidence for likely aspiration pneumonia of the right lower lobe.  COVID-19 test was -2 weeks ago but was resent as well today.  No evidence of urinary tract infection.  Patient has a G-tube in place that does not appear to have any signs of infection.  His abdomen was soft and not distended.  He had no grimacing or complaint of pain with palpation of his abdomen.  Do not feel that CT scan of his abdomen is needed at this time.  Plan is for admission for antibiotics, hydration and supplemental oxygen as needed.  Currently he is satting appropriately on  room air.  Patient's mother is agreeable with the plan for admission.    Diagnosis:    ICD-10-CM    1. Aspiration pneumonia of right lower lobe due to vomit (H)  J69.0    2. Severe sepsis (H)  A41.9     R65.20        Disposition:  Admitted to Observation under the care of Dr. Vicente, Hospitalist.    Discharge Medications:  New Prescriptions    No medications on file       Scribe Disclosure:  I, Arnaud Bustillos, am serving as a scribe at 8:28 PM on 5/14/2020 to document services personally performed by Tamara Mcwilliams MD based on my observations and the provider's statements to me.   5/14/2020    EMERGENCY DEPARTMENT       Tamara Mcwilliams MD  05/14/20 4119

## 2020-05-16 LAB
ANION GAP SERPL CALCULATED.3IONS-SCNC: 2 MMOL/L (ref 3–14)
BUN SERPL-MCNC: 11 MG/DL (ref 7–30)
CALCIUM SERPL-MCNC: 8.9 MG/DL (ref 8.5–10.1)
CHLORIDE SERPL-SCNC: 107 MMOL/L (ref 94–109)
CO2 SERPL-SCNC: 30 MMOL/L (ref 20–32)
CREAT SERPL-MCNC: 0.82 MG/DL (ref 0.66–1.25)
GFR SERPL CREATININE-BSD FRML MDRD: >90 ML/MIN/{1.73_M2}
GLUCOSE SERPL-MCNC: 134 MG/DL (ref 70–99)
POTASSIUM SERPL-SCNC: 4.2 MMOL/L (ref 3.4–5.3)
SODIUM SERPL-SCNC: 139 MMOL/L (ref 133–144)

## 2020-05-16 PROCEDURE — 25000132 ZZH RX MED GY IP 250 OP 250 PS 637: Mod: GY

## 2020-05-16 PROCEDURE — 40000275 ZZH STATISTIC RCP TIME EA 10 MIN

## 2020-05-16 PROCEDURE — 80048 BASIC METABOLIC PNL TOTAL CA: CPT | Performed by: INTERNAL MEDICINE

## 2020-05-16 PROCEDURE — 12000000 ZZH R&B MED SURG/OB

## 2020-05-16 PROCEDURE — 25000132 ZZH RX MED GY IP 250 OP 250 PS 637: Mod: GY | Performed by: INTERNAL MEDICINE

## 2020-05-16 PROCEDURE — 94640 AIRWAY INHALATION TREATMENT: CPT

## 2020-05-16 PROCEDURE — 25000125 ZZHC RX 250: Performed by: INTERNAL MEDICINE

## 2020-05-16 PROCEDURE — 27210429 ZZH NUTRITION PRODUCT INTERMEDIATE LITER

## 2020-05-16 PROCEDURE — 94640 AIRWAY INHALATION TREATMENT: CPT | Mod: 76

## 2020-05-16 PROCEDURE — 36415 COLL VENOUS BLD VENIPUNCTURE: CPT | Performed by: INTERNAL MEDICINE

## 2020-05-16 PROCEDURE — 99232 SBSQ HOSP IP/OBS MODERATE 35: CPT | Performed by: INTERNAL MEDICINE

## 2020-05-16 PROCEDURE — 25000128 H RX IP 250 OP 636: Performed by: INTERNAL MEDICINE

## 2020-05-16 RX ADMIN — CARBAMAZEPINE 150 MG: 100 SUSPENSION ORAL at 13:41

## 2020-05-16 RX ADMIN — LEVOTHYROXINE SODIUM 150 MCG: 75 TABLET ORAL at 10:02

## 2020-05-16 RX ADMIN — MINERAL SUPPLEMENT IRON 300 MG / 5 ML STRENGTH LIQUID 100 PER BOX UNFLAVORED 220 MG: at 10:03

## 2020-05-16 RX ADMIN — PIPERACILLIN AND TAZOBACTAM 3.38 G: 3; .375 INJECTION, POWDER, FOR SOLUTION INTRAVENOUS at 17:38

## 2020-05-16 RX ADMIN — PIPERACILLIN AND TAZOBACTAM 3.38 G: 3; .375 INJECTION, POWDER, FOR SOLUTION INTRAVENOUS at 04:06

## 2020-05-16 RX ADMIN — HYDROCORTISONE 10 MG: 10 TABLET ORAL at 17:38

## 2020-05-16 RX ADMIN — ALBUTEROL SULFATE 2.5 MG: 2.5 SOLUTION RESPIRATORY (INHALATION) at 07:51

## 2020-05-16 RX ADMIN — ACETYLCYSTEINE 2 ML: 200 SOLUTION ORAL; RESPIRATORY (INHALATION) at 19:17

## 2020-05-16 RX ADMIN — ALBUTEROL SULFATE 2.5 MG: 2.5 SOLUTION RESPIRATORY (INHALATION) at 12:21

## 2020-05-16 RX ADMIN — BRIVARACETAM 100 MG: 10 SOLUTION ORAL at 21:48

## 2020-05-16 RX ADMIN — CARBAMAZEPINE 150 MG: 100 SUSPENSION ORAL at 06:54

## 2020-05-16 RX ADMIN — CARBAMAZEPINE 150 MG: 100 SUSPENSION ORAL at 17:52

## 2020-05-16 RX ADMIN — Medication 40 MG: at 11:12

## 2020-05-16 RX ADMIN — MICONAZOLE NITRATE: 20 POWDER TOPICAL at 23:00

## 2020-05-16 RX ADMIN — BRIVARACETAM 100 MG: 10 SOLUTION ORAL at 10:07

## 2020-05-16 RX ADMIN — ACETYLCYSTEINE 2 ML: 200 SOLUTION ORAL; RESPIRATORY (INHALATION) at 07:51

## 2020-05-16 RX ADMIN — ALBUTEROL SULFATE 2.5 MG: 2.5 SOLUTION RESPIRATORY (INHALATION) at 19:17

## 2020-05-16 RX ADMIN — METOCLOPRAMIDE 5 MG: 5 SOLUTION ORAL at 10:05

## 2020-05-16 RX ADMIN — PIPERACILLIN AND TAZOBACTAM 3.38 G: 3; .375 INJECTION, POWDER, FOR SOLUTION INTRAVENOUS at 10:11

## 2020-05-16 RX ADMIN — HYDROCORTISONE 20 MG: 20 TABLET ORAL at 10:03

## 2020-05-16 RX ADMIN — PIPERACILLIN AND TAZOBACTAM 3.38 G: 3; .375 INJECTION, POWDER, FOR SOLUTION INTRAVENOUS at 21:54

## 2020-05-16 RX ADMIN — CARBAMAZEPINE 150 MG: 100 SUSPENSION ORAL at 00:31

## 2020-05-16 RX ADMIN — ACETYLCYSTEINE 2 ML: 200 SOLUTION ORAL; RESPIRATORY (INHALATION) at 12:28

## 2020-05-16 RX ADMIN — ASPIRIN 81 MG 81 MG: 81 TABLET ORAL at 10:03

## 2020-05-16 RX ADMIN — ENOXAPARIN SODIUM 40 MG: 40 INJECTION SUBCUTANEOUS at 10:02

## 2020-05-16 RX ADMIN — METOCLOPRAMIDE 5 MG: 5 SOLUTION ORAL at 20:15

## 2020-05-16 ASSESSMENT — ACTIVITIES OF DAILY LIVING (ADL)
ADLS_ACUITY_SCORE: 43

## 2020-05-16 NOTE — PROGRESS NOTES
[facility]    Hospitalist Progress Note    Assessment & Plan   Keyon Farias is a 59 year old male with PMH of recurrent aspiration pneumonia, history of cardiac arrest and TBI, nonverbal, panhypopituitarism, seizure disorder, who presents with sepsis, unclear etiology, possibly aspiration pneumonia.     Sepsis, unclear etiology, suspect aspiration pneumonia  COVID-19 - NEGATIVE  Elevated lactic acid - Normalized  AssessPatient has been hospitalized twenty five times since January 2019, the vast majority for aspiration pneumonia or pneumonitis, last hospitalized 5/4 to 5/6/2020 and was discharged on Augmentin for 7 days. Mother Savannah who is his caretaker and guardian reports that he was taking Augmentin through this morning. Patient nevertheless developed a fever and vomiting. CXR shows a hazy right lower lobe opacity. Noted fever of 103.2F, tachycardic to 125 bpm, elevated WBC and lactate, all consistent with sepsis. It's unclear to me how patient could have developed recurrent aspiration pneumonia while still taking Augmentin. Review of past hospitalizations suggest that he has Pseudomonas colonization, so will cover for Pseudomonas. Will also look for other sources of sepsis. COVID-19 collected negative.   Lipase, LFTs normal. Lactate level nl, nl ferritin, procalcitonin nl range at 0.15.   Benign abd exam    Today: appears to be doing well. Afebrile since admission.  103 on admission.   Wbc 15---> 9.7 on 5/15.   bld cx ngtd.   Lungs sound fine.   CXR on admission: Hazy opacity at the right lung base could be related to atelectasis or  pneumonia. The left lung is clear    Suspect aspiration. Appears to have cleared fairly well. Initial concern may represent MRSA but continued improvement on zosyn. May have had simply chemical aspiration.     Had emesis at home per mother.  but no recurrence here.  Benign abd exam.   Plan:  - Zosyn alone for now, may discharge on repeat course of augmentin  - ID  following and await in put  - PTA PPI  -follow blood cx  - likely discharge home tomorrow if doing well.   Discussed care plan with patient's mother     Seizure disorder: PTA tegretol, brivaracetam  Doing well.      Panhypopituitarism: PTA levothyroxine, hydrocortisone, no PTA meds.      TBI, splastic hemiplegia, severe dysphagia: Has g tube, does not take any PO  - Nutrition consult for tube feeds, resumed   - PTA acetylcysteine, albuterol scheduled  - PTA ASA  - PTA iron  -bmp today--> nl     DVT Prophylaxis: Enoxaparin (Lovenox) SQ  Code Status: Full Code           Diet: Adult Formula Drip Feeding: Continuous Isosource 1.5; Jejunostomy; Goal Rate: 100; mL/hr; From: 8:00 AM; 8:00 PM; Medication - Feeding Tube Flush Frequency: At least 15-30 mL water before and after medication administration and with tube  -bmp today   DVT Prophylaxis: Pneumatic Compression Devices  Lerma Catheter: not present  Code Status: Full Code               Disposition Plan-     Expected discharge- likely tomorrow if doing well. Await ID input, would be home with mother and resume home care,     Alvaro Barahona MD  Text Page  (7am to 6pm)  Interval History   Unable to provide hx given cognitive impairment  No issues per RN    -Data reviewed today: I reviewed all new labs and imaging results over the last 24 hours. I personally reviewed imaging and labs since admission.     Physical Exam   Temp: 97.5  F (36.4  C) Temp src: Axillary BP: 104/58 Pulse: 76 Heart Rate: 67 Resp: 16 SpO2: 98 % O2 Device: None (Room air)    Vitals:    05/15/20 0600 05/16/20 0636   Weight: 71.4 kg (157 lb 6.5 oz) 71.9 kg (158 lb 8.2 oz)     Vital Signs with Ranges  Temp:  [97.5  F (36.4  C)-98.2  F (36.8  C)] 97.5  F (36.4  C)  Pulse:  [76-77] 76  Heart Rate:  [67-82] 67  Resp:  [16-20] 16  BP: (104-116)/(55-61) 104/58  SpO2:  [94 %-98 %] 98 %  I/O last 3 completed shifts:  In: 741 [I.V.:741]  Out: -     Constitutional: Alert, laying in bed, nonverbal, making  verbal noises which likely represents his baseline in response to my visit, nad  Respiratory: CTAB, breathing easily  Cardiovascular: RRR no r/g/m  GI: soft, nt, nd, feeding tube site looks fine  Skin/Integumen: no rash or skin breakdown  Neuro: nonverbal, alert, appears to respond to examiner, no abnl movements, at baseline cognitive impairement and increased tone extrem X 4. Contractures of extremities    Medications     dextrose         sodium chloride 0.9%  1,000 mL Intravenous Once     acetylcysteine  2 mL Nebulization 4x Daily     albuterol  2.5 mg Nebulization Q4H While awake     aspirin  81 mg Oral or Feeding Tube Daily     Brivaracetam  100 mg Oral or Feeding Tube BID     carBAMazepine  150 mg Oral or Feeding Tube Q6H     enoxaparin ANTICOAGULANT  40 mg Subcutaneous Q24H     ferrous sulfate  220 mg Per Feeding Tube Daily     fiber modular (NUTRISOURCE FIBER)  1 packet Per Feeding Tube Daily     hydrocortisone  10 mg Oral or Feeding Tube Daily with supper     hydrocortisone  20 mg Oral or Feeding Tube QAM     levothyroxine  150 mcg Oral QAM     metoclopramide  5 mg Per Feeding Tube BID     pantoprazole  40 mg Per J Tube Daily     piperacillin-tazobactam  3.375 g Intravenous Q6H     sodium chloride (PF)  3 mL Intracatheter Q8H       Data   Recent Labs   Lab 05/15/20  0942 05/14/20  2222 05/14/20  2031   WBC 9.7  --  15.6*   HGB 11.0*  --  13.2*   MCV 88  --  87     --  188    130*  --    POTASSIUM 3.9 3.8  --    CHLORIDE 104 97  --    CO2 23 28  --    BUN 12 15  --    CR 0.83 0.74  --    ANIONGAP 6 5  --    STEVE 7.7* 8.1*  --    GLC 85 116*  --    ALBUMIN  --  2.8*  --    PROTTOTAL  --  7.4  --    BILITOTAL  --  0.3  --    ALKPHOS  --  95  --    ALT  --  38  --    AST  --  22  --    LIPASE  --  292  --        Imaging:   No results found for this or any previous visit (from the past 24 hour(s)).

## 2020-05-16 NOTE — PROGRESS NOTES
Patient is on RA with SpO2 in the mid 90's. BS coarse and diminished. All nebs were given as ordered.  Will cont to follow.  5/15/2020  Heather Phillips, RT

## 2020-05-16 NOTE — PLAN OF CARE
DATE & TIME:, 5/15/48062-78ih shift  Cognitive Concerns/ Orientation : mainly Non-verbal, calm, cooperative can give thumbs up, shake head no most of time  BEHAVIOR & AGGRESSION TOOL COLOR: Green  CIWA SCORE: na   ABNL VS/O2: VSS on RA- afebrile  MOBILITY: up with lift- turn and repo q2hrs  PAIN MANAGMENT: Denies  DIET:  TF runs 8am to 8pm, stopped at 8:20 tonight.  BOWEL/BLADDER: Incontinent both  ABNL LAB/BG: WDL  DRAIN/DEVICES: PIV now SL  TELEMETRY RHYTHM: na  SKIN: excoriation/non-blanchable redness on coccyx.  Scattered bruises  TESTS/PROCEDURES: na  D/C DAY/GOALS/PLACE: likely back home tomorrow  OTHER IMPORTANT INFO: IV abx. LS diminished. Guardian updated this shift.

## 2020-05-16 NOTE — PLAN OF CARE
DATE & TIME: 5/15/20, 2300 - 3530   Cognitive Concerns/ Orientation : HECTOR. Mostly non-verbal. Can give thumbs up and shake head  BEHAVIOR & AGGRESSION TOOL COLOR: Green  CIWA SCORE: N/a   ABNL VS/O2: VSS on room air  MOBILITY: Up with lift assist x 2. Turn and repositioned  PAIN MANAGMENT:  Denied, no non-verbal indicators of pain  DIET: NPO. Tube feeding to commence at 0800  BOWEL/BLADDER: Incontinent of B/B. No BM this shift  ABNL LAB/BG: N/a  DRAIN/DEVICES: PIV SL. J-tube clamped  TELEMETRY RHYTHM: N/a  SKIN: Excoriation/non-blanchable redness to memo-anus. Bruises  TESTS/PROCEDURES: N/a  D/C DAY/GOALS/PLACE: Possible discharge home today  OTHER IMPORTANT INFO: Continue on IV Zosyn.

## 2020-05-16 NOTE — PROGRESS NOTES
DATE & TIME: 5/16/20, 7a to 7p       Cognitive Concerns/ Orientation : non-verbal. Can give thumbs up and shake head  BEHAVIOR & AGGRESSION TOOL COLOR: Green  CIWA SCORE: N/a    ABNL VS/O2: VSS on room air  MOBILITY: Up with lift assist x 2. Turn and repositioned  PAIN MANAGMENT:  Denied, no non-verbal indicators of pain  DIET: NPO. Tube feeding running @ 100ml off @ 8pm  BOWEL/BLADDER: Incontinent of B/B. No BM this shift  ABNL LAB/BG: N/a  DRAIN/DEVICES: PIV SL. J-tube infusing  TELEMETRY RHYTHM: N/a  SKIN: Excoriation/non-blanchable redness to memo-anus. Bruises, barrier cream applied  TESTS/PROCEDURES: N/a  D/C DAY/GOALS/PLACE: Possible discharge home bryce  OTHER IMPORTANT INFO: Miconazole powder ordered, ABX IV Zosyn

## 2020-05-17 VITALS
HEART RATE: 83 BPM | SYSTOLIC BLOOD PRESSURE: 104 MMHG | TEMPERATURE: 98.1 F | DIASTOLIC BLOOD PRESSURE: 56 MMHG | WEIGHT: 148.15 LBS | OXYGEN SATURATION: 95 % | BODY MASS INDEX: 20.09 KG/M2 | RESPIRATION RATE: 16 BRPM

## 2020-05-17 PROBLEM — J69.0 ASPIRATION PNEUMONIA OF RIGHT LOWER LOBE DUE TO VOMIT (H): Status: ACTIVE | Noted: 2020-05-17

## 2020-05-17 LAB
ANION GAP SERPL CALCULATED.3IONS-SCNC: 5 MMOL/L (ref 3–14)
BUN SERPL-MCNC: 11 MG/DL (ref 7–30)
CALCIUM SERPL-MCNC: 8.7 MG/DL (ref 8.5–10.1)
CHLORIDE SERPL-SCNC: 102 MMOL/L (ref 94–109)
CO2 SERPL-SCNC: 26 MMOL/L (ref 20–32)
CREAT SERPL-MCNC: 0.87 MG/DL (ref 0.66–1.25)
GFR SERPL CREATININE-BSD FRML MDRD: >90 ML/MIN/{1.73_M2}
GLUCOSE SERPL-MCNC: 73 MG/DL (ref 70–99)
PLATELET # BLD AUTO: 210 10E9/L (ref 150–450)
POTASSIUM SERPL-SCNC: 3.6 MMOL/L (ref 3.4–5.3)
SODIUM SERPL-SCNC: 133 MMOL/L (ref 133–144)

## 2020-05-17 PROCEDURE — 27210429 ZZH NUTRITION PRODUCT INTERMEDIATE LITER

## 2020-05-17 PROCEDURE — 25000125 ZZHC RX 250: Performed by: INTERNAL MEDICINE

## 2020-05-17 PROCEDURE — 94640 AIRWAY INHALATION TREATMENT: CPT

## 2020-05-17 PROCEDURE — 85049 AUTOMATED PLATELET COUNT: CPT | Performed by: INTERNAL MEDICINE

## 2020-05-17 PROCEDURE — 99238 HOSP IP/OBS DSCHRG MGMT 30/<: CPT | Performed by: INTERNAL MEDICINE

## 2020-05-17 PROCEDURE — 25000132 ZZH RX MED GY IP 250 OP 250 PS 637: Mod: GY | Performed by: INTERNAL MEDICINE

## 2020-05-17 PROCEDURE — 36415 COLL VENOUS BLD VENIPUNCTURE: CPT | Performed by: INTERNAL MEDICINE

## 2020-05-17 PROCEDURE — 40000275 ZZH STATISTIC RCP TIME EA 10 MIN

## 2020-05-17 PROCEDURE — 25000128 H RX IP 250 OP 636: Performed by: INTERNAL MEDICINE

## 2020-05-17 PROCEDURE — 94640 AIRWAY INHALATION TREATMENT: CPT | Mod: 76

## 2020-05-17 PROCEDURE — 80048 BASIC METABOLIC PNL TOTAL CA: CPT | Performed by: INTERNAL MEDICINE

## 2020-05-17 RX ADMIN — ACETYLCYSTEINE 2 ML: 200 SOLUTION ORAL; RESPIRATORY (INHALATION) at 08:06

## 2020-05-17 RX ADMIN — LEVOTHYROXINE SODIUM 150 MCG: 75 TABLET ORAL at 08:56

## 2020-05-17 RX ADMIN — MINERAL SUPPLEMENT IRON 300 MG / 5 ML STRENGTH LIQUID 100 PER BOX UNFLAVORED 220 MG: at 08:55

## 2020-05-17 RX ADMIN — ALBUTEROL SULFATE 2.5 MG: 2.5 SOLUTION RESPIRATORY (INHALATION) at 11:15

## 2020-05-17 RX ADMIN — CARBAMAZEPINE 150 MG: 100 SUSPENSION ORAL at 05:24

## 2020-05-17 RX ADMIN — CARBAMAZEPINE 150 MG: 100 SUSPENSION ORAL at 00:39

## 2020-05-17 RX ADMIN — MICONAZOLE NITRATE: 20 POWDER TOPICAL at 06:57

## 2020-05-17 RX ADMIN — BRIVARACETAM 100 MG: 10 SOLUTION ORAL at 08:56

## 2020-05-17 RX ADMIN — ACETYLCYSTEINE 2 ML: 200 SOLUTION ORAL; RESPIRATORY (INHALATION) at 11:15

## 2020-05-17 RX ADMIN — CARBAMAZEPINE 150 MG: 100 SUSPENSION ORAL at 13:19

## 2020-05-17 RX ADMIN — HYDROCORTISONE 20 MG: 20 TABLET ORAL at 08:56

## 2020-05-17 RX ADMIN — METOCLOPRAMIDE 5 MG: 5 SOLUTION ORAL at 08:55

## 2020-05-17 RX ADMIN — ASPIRIN 81 MG 81 MG: 81 TABLET ORAL at 08:56

## 2020-05-17 RX ADMIN — ALBUTEROL SULFATE 2.5 MG: 2.5 SOLUTION RESPIRATORY (INHALATION) at 08:05

## 2020-05-17 RX ADMIN — PIPERACILLIN AND TAZOBACTAM 3.38 G: 3; .375 INJECTION, POWDER, FOR SOLUTION INTRAVENOUS at 09:02

## 2020-05-17 RX ADMIN — PIPERACILLIN AND TAZOBACTAM 3.38 G: 3; .375 INJECTION, POWDER, FOR SOLUTION INTRAVENOUS at 04:48

## 2020-05-17 RX ADMIN — ENOXAPARIN SODIUM 40 MG: 40 INJECTION SUBCUTANEOUS at 08:55

## 2020-05-17 RX ADMIN — Medication 40 MG: at 08:55

## 2020-05-17 ASSESSMENT — ACTIVITIES OF DAILY LIVING (ADL)
ADLS_ACUITY_SCORE: 43

## 2020-05-17 NOTE — DISCHARGE SUMMARY
[facility]    Discharge Summary  Hospitalist    Date of Admission:  5/14/2020  Date of Discharge:  5/17/2020  Discharging Provider: Alvaro Barahona MD    Discharge Diagnoses   Sepsis syndrome due to Aspiration pneumonia  Nausea and vomiting at home- resolved. Possible viral gastroenteritis    History of Present Illness   Keyon Farias is a 59 year old male who presents with fever and vomiting. Savannah reports that patient developed a fever earlier today, increased weakness/lethargy, and vomited once today after he developed the fever. He vomited green bile. He vomited once earlier this week as well. He is nonverbal. Savannah reports that otherwise his mental is not significantly changed from his baseline. She notes patient has had a mild nonproductive cough, decreased appetite.       Hospital Course   Keyon Farias was admitted on 5/14/2020.  The following problems were addressed during his hospitalization:    Principal Problem:    Aspiration pneumonia of right lower lobe due to vomit (H)  Active Problems:    Sepsis (H)    Nausea and vomiting  Keyon Farias is a 59 year old male with PMH of recurrent aspiration pneumonia, history of cardiac arrest and TBI, nonverbal, panhypopituitarism, seizure disorder, who presents with sepsis, unclear etiology, possibly aspiration pneumonia.     Sepsis, unclear etiology, suspect aspiration pneumonia  COVID-19 - NEGATIVE  Elevated lactic acid - Normalized  AssessPatient has been hospitalized twenty five times since January 2019, the vast majority for aspiration pneumonia or pneumonitis, last hospitalized 5/4 to 5/6/2020 and was discharged on Augmentin for 7 days. Mother Savannah who is his caretaker and guardian reports that he was taking Augmentin through this morning. Patient nevertheless developed a fever and vomiting. CXR shows a hazy right lower lobe opacity. Noted fever of 103.2F, tachycardic to 125 bpm, elevated WBC and lactate, all consistent with sepsis.  It's unclear to me how patient could have developed recurrent aspiration pneumonia while still taking Augmentin. Review of past hospitalizations suggest that he has Pseudomonas colonization, so will cover for Pseudomonas. Will also look for other sources of sepsis. -COVID-19 collected negative.   -Lipase, LFTs normal. Lactate level elevated then normalized with fluids,  nl ferritin, procalcitonin nl range at 0.15.    -Wbc 15---> 9.7 on 5/15.   -bld cx ngtd.   -Lungs sound clear.   -CXR on admission: Hazy opacity at the right lung base could be related to atelectasis or  pneumonia. The left lung is clear     -Suspect aspiration. Appears to have cleared fairly well. Initial concern may represent MRSA but continued improvement on zosyn. May have had simply chemical aspiration.      -Had emesis at home per mother.  but no recurrence here.   may have had viral gastroeneritis with associated aspiration.   -Benign abd exam during stay. Tolerating tube feeds. Has appeared comfortable.   --Nl bmp. Wbc normalized, afebrile  unremarkeable exams.    -t gave a thumbs up to RN today and smiling during my exam.       Plan at discharge:  -discharge on 5 day course of Augmentin down FT. Discussed care plan with pt's RN and mother day of discharge  - PTA PPI  -follow blood cx  -follow-up pcp this week.        Seizure disorder: PTA tegretol, brivaracetam  Doing well. resume meds at home.      Panhypopituitarism: PTA levothyroxine, hydrocortisone, no PTA meds.      TBI, splastic hemiplegia, severe dysphagia: Has g tube, does not take any PO  - Nutrition consult for tube feeds, resumed   - PTA acetylcysteine, albuterol scheduled  - PTA ASA  - PTA iron  -bmp has been normal.      DVT Prophylaxis: Enoxaparin (Lovenox) SQ  Code Status: Full Code       Diet: Adult Formula Drip Feeding: Continuous Isosource 1.5; Jejunostomy; Goal Rate: 100; mL/hr; From: 8:00 AM; 8:00 PM; Medication - Feeding Tube Flush Frequency: At least 15-30 mL water  before and after medication administration and with tube  Bmp nl day of discharge.     DVT Prophylaxis: Pneumatic Compression Devices  Lerma Catheter: not present  Code Status: Full Code               Disposition Plan-     Expected discharge- discharge home with resumption of home care RN. Pt under care of his mother      Alvaro Barahona MD, MD    Significant Results and Procedures   See hospital course    Pending Results   These results will be followed up by hospitalist  Unresulted Labs Ordered in the Past 30 Days of this Admission     Date and Time Order Name Status Description    5/14/2020 2023 Blood culture Preliminary     5/14/2020 2023 Blood culture Preliminary           Code Status   Full Code       Primary Care Physician   Carlos Gomez    Physical Exam   Temp: 97.7  F (36.5  C) Temp src: Axillary BP: 104/63 Pulse: 74 Heart Rate: 74 Resp: 16 SpO2: 98 % O2 Device: None (Room air)    Vitals:    05/15/20 0600 05/16/20 0636 05/17/20 0542   Weight: 71.4 kg (157 lb 6.5 oz) 71.9 kg (158 lb 8.2 oz) 67.2 kg (148 lb 2.4 oz)     Vital Signs with Ranges  Temp:  [97.5  F (36.4  C)-98.1  F (36.7  C)] 97.7  F (36.5  C)  Pulse:  [74] 74  Heart Rate:  [62-83] 74  Resp:  [16-18] 16  BP: ()/(49-70) 104/63  SpO2:  [94 %-98 %] 98 %  I/O last 3 completed shifts:  In: 2498 [NG/GT:600]  Out: -     Constitutional: Alert, laying in bed, nonverbal, making verbal noises which likely represents his baseline in response to my visit, nad, smiling today,  Respiratory:     CTAB, breathing easily  Cardiovascular: RRR no r/g/m  GI: soft, nt, nd, feeding tube site looks fine  Skin/Integumen: no rash or skin breakdown  Neuro: nonverbal, alert, appears to respond to examiner, no abnl movements, at baseline cognitive impairement and increased tone extrem X 4. Contractures of extremities       Discharge Disposition   Discharged to home  Condition at discharge: Good    Consultations This Hospital Stay   INFECTIOUS DISEASES IP  CONSULT  NUTRITION SERVICES ADULT IP CONSULT  PHARMACY IP CONSULT  CARE COORDINATOR IP CONSULT    Time Spent on this Encounter   I, Alvaro Barahona MD, personally saw the patient today and spent less than or equal to 30 minutes discharging this patient.    Discharge Orders      Home care nursing referral      Reason for your hospital stay    Fever, Suspect aspiration pneumonia     Follow-up and recommended labs and tests     1. Resume home care nurse, call primary care provider for further home care orders if needed/to discuss further home care orders  2. Follow up with primary care provider 1 week     Activity    Your activity upon discharge: activity as tolerated     Discharge Instructions    Resume prior medications  Complete course of Augmentin antibiotic for aspiration pneumonia     Full Code     Diet    Follow this diet upon discharge: Orders Placed This Encounter      Adult Formula Drip Feeding: Continuous Isosource 1.5; Jejunostomy; Goal Rate: 100; mL/hr; From: 8:00 AM; 8:00 PM; Medication - Feeding Tube Flush Frequency: At least 15-30 mL water before and after medication administration and with tube clogging;...     Discharge Medications   Current Discharge Medication List      CONTINUE these medications which have CHANGED    Details   amoxicillin-clavulanate (AUGMENTIN) 875-125 MG tablet 1 tablet by Per Feeding Tube route 2 times daily for 5 days  Qty: 10 tablet, Refills: 0    Comments: Future refills by PCP Dr. Carlos Gomez with phone number [phone].  Associated Diagnoses: Aspiration pneumonia of lower lobe, unspecified aspiration pneumonia type, unspecified laterality (H)         CONTINUE these medications which have NOT CHANGED    Details   acetylcysteine (MUCOMYST) 20 % neb solution Take 2 mLs by nebulization 4 times daily With albuterol at 0700, 1100, 1500, and 1900       albuterol (PROVENTIL) (5 MG/ML) 0.5% neb solution Take 2.5 mg by nebulization every 4 hours (while awake) 0700 1100 1500  1900 with mucomyst       aspirin (ASA) 81 MG chewable tablet 81 mg by Oral or Feeding Tube route daily At 0900      bacitracin ointment Apply topically daily as needed for wound care To PEG site.       Bioflavonoid Products (VITAMIN C PLUS) 1000 MG TABS 1 tablet by Oral or FT or NG tube route daily       Brivaracetam (BRIVIACT) 10 MG/ML solution 100 mg by Oral or Feeding Tube route 2 times daily 0900, 2100      calcium carbonate 1250 (500 CA) MG/5ML SUSP suspension 5 mLs (1,250 mg) by Per J Tube route 3 times daily (with meals)  Qty: 450 mL    Associated Diagnoses: Malnutrition (H)      carBAMazepine (TEGRETOL) 100 MG/5ML suspension 150 mg by Oral or Feeding Tube route every 6 hours At 06:00, 12:00, 18:00 and 24:00 for seizures       ferrous sulfate 220 (44 Fe) MG/5ML ELIX 220 mg by Per Feeding Tube route daily      Guar Gum (FIBER MODULAR, NUTRISOURCE FIBER,) packet 1 packet by Per G Tube route daily  Qty: 30 packet, Refills: 0    Associated Diagnoses: Pneumonia of both lower lobes due to infectious organism (H)      !! hydrocortisone (CORTEF) 5 MG tablet 10 mg by Oral or FT or NG tube route daily (with dinner) At 1500      !! hydrocortisone (CORTEF) 5 MG tablet 20 mg by Oral or FT or NG tube route every morning       hydrocortisone 1 % CREA cream Place rectally 2 times daily as needed for other Apply to reddened memo areas as needed      levothyroxine (SYNTHROID/LEVOTHROID) 150 MCG tablet Take 150 mcg by mouth every morning      melatonin (MELATONIN) 1 MG/ML LIQD liquid 6 mg by Per NG tube route At Bedtime       metoclopramide (REGLAN) 5 MG/5ML solution 5 mg by Per Feeding Tube route 2 times daily      miconazole (MICATIN) 2 % AERP powder Apply topically 2 times daily as needed       mupirocin (BACTROBAN) 2 % external ointment Apply topically 2 times daily as needed       pantoprazole (PROTONIX) 2 mg/mL SUSP suspension 20 mLs (40 mg) by Per J Tube route daily  Qty: 400 mL, Refills: 1    Associated Diagnoses:  Gastroesophageal reflux disease, esophagitis presence not specified      potassium & sodium phosphates (NEUTRA-PHOS) 280-160-250 MG Packet Take 1 packet by mouth 3 times daily       Scopolamine HBr POWD Dispense #90. Mix contents with small amount of water for admin via J-tube.  Administer 0.8 mg three times each day.  Qty: 90 Bottle, Refills: 11    Associated Diagnoses: Aspiration pneumonia (H)      Skin Protectants, Misc. (BALMEX SKIN PROTECTANT) OINT Externally apply topically 2 times daily as needed (irritation) Applay to reddened memo areas twice daily as needed      sodium bicarbonate 650 MG tablet Take 1 tablet (650 mg) by mouth daily  Qty: 30 tablet, Refills: 0    Associated Diagnoses: Hyponatremia      testosterone cypionate (DEPOTESTOTERONE CYPIONATE) 200 MG/ML injection Inject 76 mg into the muscle See Admin Instructions Every 2 weeks on Thursdays  76 mg or 0.38 mL      vitamin C (ASCORBIC ACID) 250 MG TABS tablet 1,000 mg by Per Feeding Tube route daily      vitamin D3 (CHOLECALCIFEROL) 2000 units (50 mcg) tablet Take 2,000 Units by mouth daily Crush and feed via j-tube @@ 0900      order for DME Equipment being ordered: Nebulizer  Qty: 1 Units, Refills: 0    Associated Diagnoses: Pneumonia of both lower lobes due to infectious organism (H)       !! - Potential duplicate medications found. Please discuss with provider.        Allergies   Allergies   Allergen Reactions     Dilantin [Phenytoin Sodium]      Scopolamine Hives     Hives in response to scopolamine PATCH      Valproic Acid      Toxicity c bone marrow suspension, elevated ammonia levels      Data   Most Recent 3 CBC's:  Recent Labs   Lab Test 05/17/20  0856 05/15/20  0942 05/14/20  2031 05/06/20  0841   WBC  --  9.7 15.6* 7.3   HGB  --  11.0* 13.2* 13.0*   MCV  --  88 87 87    173 188 163      Most Recent 3 BMP's:  Recent Labs   Lab Test 05/17/20  0856 05/16/20  1252 05/15/20  0942    139 133   POTASSIUM 3.6 4.2 3.9   CHLORIDE 102  107 104   CO2 26 30 23   BUN 11 11 12   CR 0.87 0.82 0.83   ANIONGAP 5 2* 6   STEVE 8.7 8.9 7.7*   GLC 73 134* 85     Most Recent 2 LFT's:  Recent Labs   Lab Test 05/14/20 2222 05/04/20 2109   AST 22 18   ALT 38 30   ALKPHOS 95 107   BILITOTAL 0.3 0.3     Most Recent INR's and Anticoagulation Dosing History:  Anticoagulation Dose History     Recent Dosing and Labs Latest Ref Rng & Units 1/22/2017 1/22/2017 1/23/2017 1/25/2017 1/30/2017 2/7/2017 1/1/2020    INR 0.86 - 1.14 2.48(H) 2.58(H) 1.53(H) 1.49(H) 1.32(H) 1.27(H) 1.28(H)        Most Recent 3 Troponin's:  Recent Labs   Lab Test 01/22/17  0500 01/21/17  2348 01/21/17  1600   TROPI 0.017 0.025 0.028     Most Recent Cholesterol Panel:  Recent Labs   Lab Test 11/29/16  0430   TRIG 100     Most Recent 6 Bacteria Isolates From Any Culture (See EPIC Reports for Culture Details):  Recent Labs   Lab Test 05/14/20 2224 05/14/20 2031 05/04/20 2156 05/04/20 2109 03/23/20 2122 02/17/20  2253   CULT No growth after 2 days No growth after 3 days No growth No growth No growth  No growth No growth     Most Recent TSH, T4 and A1c Labs:  Recent Labs   Lab Test 08/12/19 2200 07/05/18  0021   TSH  --   --  <0.01*   T4  --   --  1.66*   A1C 6.1*   < >  --     < > = values in this interval not displayed.     Results for orders placed or performed during the hospital encounter of 05/14/20   XR Chest Port 1 View    Narrative    CHEST ONE VIEW PORTABLE   5/14/2020 9:29 PM     HISTORY:  Fever.    COMPARISON: 5/4/2020.      Impression    IMPRESSION: Shallow inspiration. Elevation of the right hemidiaphragm.  Hazy opacity at the right lung base could be related to atelectasis or  pneumonia. The left lung is clear. No pneumothorax.    LEANDRA RIVERA MD

## 2020-05-17 NOTE — PLAN OF CARE
DATE & TIME: 5/16/20, 7p-7a     Cognitive Concerns/ Orientation : non-verbal, responds to name  BEHAVIOR & AGGRESSION TOOL COLOR: Yellow for confusion  CIWA SCORE: N/a    ABNL VS/O2: VSS on room air  MOBILITY: Up with lift assist x 2. Turn and repositioned  PAIN MANAGMENT:  Denied, no non-verbal indicators of pain  DIET: No diet orders, per note pt. Doesn't intake anything PO. Tube feeding turned off at 8P. 200ml flush Q4  BOWEL/BLADDER: Incontinent of B/B. No BM this shift  ABNL LAB/BG: N/a  DRAIN/DEVICES: PIV SL. J-tube clamped, dressing changed, CDI  TELEMETRY RHYTHM: N/a  SKIN: Excoriation/non-blanchable redness to memo-anus. Bruises, barrier cream applied  TESTS/PROCEDURES: N/a  D/C DAY/GOALS/PLACE: Possible discharge home   OTHER IMPORTANT INFO:  Thick secretions, oral suctioning PRN. Contact precautions maintained.

## 2020-05-17 NOTE — CONSULTS
Care Transition Initial Assessment - RN        Met with: Called mother Savannah.  DATA   Principal Problem:    Aspiration pneumonia of right lower lobe due to vomit (H)  Active Problems:    Sepsis (H)    Nausea and vomiting       Contact information and PCP information verified: Yes  Lives With: parent(s)        Insurance concerns: No Insurance issues identified  ASSESSMENT  Patient currently receives the following services:  Home Care RN through [facility] (P [phone] Fax [phone]).  He also has PCA service through [facility] and TF supplies through [facility].       Identified issues/concerns regarding health management:   Per Hospitalist, patient is medically stable to discharge to home.  Contacted patient's mother Savannah and she stated she had spoken with the physician and was aware he was discharging to home today.  He will require stretcher transportation and she requests later afternoon ride.  She stated the PCA was at their home.      HE stretcher arranged for 1600.  HUC and bedside RN notified of this time.  PCS faxed to HE.  Bedside RN to contact mother to review discharge instructions and answer any other questions.    Contacted [facility] to let them know of his discharge.  Spoke to nurse Rogers and orders faxed.    PLAN  Financial costs for the patient include TBD .    Patient/family is agreeable to the plan?  Yes  Patient anticipates discharging to home .     Transportation/person available to transport on day of discharge  is [facility] stretcher ride and have they been notified/set up yes - ride scheduled for 5/17/2020 at 1600.  Plan/Disposition: Home      Care  (CTS) will continue to follow as needed.    Courtney Brown RN, BSN, PHN  Inpatient Care Coordination  [facility]  Phone: [phone]

## 2020-05-17 NOTE — PLAN OF CARE
DATE & TIME: 5/17/20 7179-5222  Cognitive Concerns/ Orientation : non-verbal, responds to name  BEHAVIOR & AGGRESSION TOOL COLOR: Yellow for confusion  CIWA SCORE: N/a    ABNL VS/O2: VSS on room air  MOBILITY: Up with lift assist x 2. Turn and repositioned  PAIN MANAGMENT:  Denied, no non-verbal indicators of pain  DIET: No diet orders, per note pt. Doesn't intake anything PO. Continuous enteral feedings- turned off 1 hr prior to discharge. 200ml flush Q4  BOWEL/BLADDER: Incontinent of B/B. No BM this shift  ABNL LAB/BG: N/a  DRAIN/DEVICES: PIV SL. J-tube infusing feedings, dressing changed, CDI  TELEMETRY RHYTHM: N/a  SKIN: Excoriation/non-blanchable redness to memo-anus. Bruises, barrier cream applied  TESTS/PROCEDURES: N/a  D/C DAY/GOALS/PLACE: Today  OTHER IMPORTANT INFO:  Thick secretions, oral suctioning PRN. Contact precautions maintained.    Spoke with patient's mother, Savannah, on the phone- updated on plan for discharge and importance of continuing aspiration precautions to decrease chance of readmittance to hospital. Reviewed antibiotic instructions with her and discharge paperwork. Pt left unit with HE transport and personal belongings at 1600 to home.

## 2020-05-20 LAB
BACTERIA SPEC CULT: NO GROWTH
SPECIMEN SOURCE: NORMAL

## 2020-05-21 LAB
BACTERIA SPEC CULT: NO GROWTH
SPECIMEN SOURCE: NORMAL

## 2020-06-18 ENCOUNTER — MEDICAL CORRESPONDENCE (OUTPATIENT)
Dept: HEALTH INFORMATION MANAGEMENT | Facility: CLINIC | Age: 60
End: 2020-06-18

## 2020-06-19 DIAGNOSIS — Z11.59 ENCOUNTER FOR SCREENING FOR OTHER VIRAL DISEASES: Primary | ICD-10-CM

## 2020-06-22 DIAGNOSIS — Z11.59 ENCOUNTER FOR SCREENING FOR OTHER VIRAL DISEASES: ICD-10-CM

## 2020-06-22 LAB
SARS-COV-2 RNA SPEC QL NAA+PROBE: NOT DETECTED
SPECIMEN SOURCE: NORMAL

## 2020-06-22 PROCEDURE — U0003 INFECTIOUS AGENT DETECTION BY NUCLEIC ACID (DNA OR RNA); SEVERE ACUTE RESPIRATORY SYNDROME CORONAVIRUS 2 (SARS-COV-2) (CORONAVIRUS DISEASE [COVID-19]), AMPLIFIED PROBE TECHNIQUE, MAKING USE OF HIGH THROUGHPUT TECHNOLOGIES AS DESCRIBED BY CMS-2020-01-R: HCPCS | Performed by: NURSE PRACTITIONER

## 2020-06-22 PROCEDURE — 99000 SPECIMEN HANDLING OFFICE-LAB: CPT | Performed by: FAMILY MEDICINE

## 2020-06-23 ENCOUNTER — TELEPHONE (OUTPATIENT)
Dept: INTERVENTIONAL RADIOLOGY/VASCULAR | Facility: CLINIC | Age: 60
End: 2020-06-23

## 2020-06-23 NOTE — TELEPHONE ENCOUNTER
IR Pre-Call/Pre-Procedure Negative COVID Test     Unable to reach mother (Savannah), along with unable to leave voice message Will attempt again later. Patient had negative COVID test 6/22.    Was able to speak to Savannah and updated on COVID test.    Step 1 Patient Notification  Mother (Savannah) notified of the negative COVID test result    Step 2 Patient Information  Verified the patient remains symptom free   Mother informed to contact the ordering provider if any of the symptoms develop prior to the procedure    Fever/Chills   Cough   Shortness of breath   New loss of taste or smell  Sore throat  Muscle or body aches  Headaches  Fatigue  Vomiting or diarrhea    Step 3 Review Visitor Policy  Patient informed of the updated visitor policy   -1 visitor allowed per patient   -Visitor must screen negative for  COVID symptoms   -Visitor must wear a mask  -Waiting rooms continue to be  closed to visitors    Corinna Medley RN

## 2020-06-24 ENCOUNTER — APPOINTMENT (OUTPATIENT)
Dept: INTERVENTIONAL RADIOLOGY/VASCULAR | Facility: CLINIC | Age: 60
End: 2020-06-24
Attending: NURSE PRACTITIONER
Payer: MEDICARE

## 2020-06-24 ENCOUNTER — HOSPITAL ENCOUNTER (OUTPATIENT)
Facility: CLINIC | Age: 60
Discharge: HOME | End: 2020-06-24
Attending: RADIOLOGY | Admitting: RADIOLOGY
Payer: MEDICARE

## 2020-06-24 VITALS
OXYGEN SATURATION: 94 % | RESPIRATION RATE: 16 BRPM | TEMPERATURE: 96 F | HEART RATE: 58 BPM | SYSTOLIC BLOOD PRESSURE: 116 MMHG | DIASTOLIC BLOOD PRESSURE: 63 MMHG

## 2020-06-24 DIAGNOSIS — R13.10 DYSPHAGIA, UNSPECIFIED TYPE: ICD-10-CM

## 2020-06-24 PROCEDURE — 27210815 ZZ H TUBE GASTRO CR13

## 2020-06-24 PROCEDURE — C1769 GUIDE WIRE: HCPCS

## 2020-06-24 PROCEDURE — 40000854 ZZH STATISTIC SIMPLE TUBE INSERTION/CHARGE, PORT, CATH, FISTULOGRAM

## 2020-06-24 PROCEDURE — 49452 REPLACE G-J TUBE PERC: CPT

## 2020-06-24 NOTE — PRE-PROCEDURE
GENERAL PRE-PROCEDURE:   Procedure:  Gastro jejunostomy tube exchange  Date/Time:  6/24/2020 12:40 PM    Written consent obtained?: Yes    Risks and benefits: Risks, benefits and alternatives were discussed    Consent given by:  Parent  Patient states understanding of procedure being performed: Yes    Patient's understanding of procedure matches consent: Yes    Procedure consent matches procedure scheduled: Yes    Appropriately NPO:  Yes  Statement of review:  I have reviewed the lab findings, diagnostic data, medications, and the plan for sedation

## 2020-06-24 NOTE — PROGRESS NOTES
1450 Pt returned from IR. Drsg CDI to abdomen.  G- tube securely intact. Pt's mother at bedside. Detailed update given.   1515 Pt discharged per w/c to private vehicle. All personal belongings taken w/ pt & mother.

## 2020-06-24 NOTE — PROGRESS NOTES
Care Suites Admission Nursing Note    Patient Information  Name: Keyon Farias  Age: 59 year old  Reason for admission: G tube change  Care Suites arrival time: 1221    Patient Admission/Assessment   Pre-procedure assessment complete: Yes  If abnormal assessment/labs, provider notified: N/A  NPO: Yes  Medications held per instructions/orders: N/A  Consent: obtained  If applicable, pregnancy test status: deferred  Patient oriented to room: Yes  Education/questions answered: Yes  Plan/other: G tube change, G tube in place with dressing.  Mother states site looks like some puss and worried about infection.  Osiris and staff notified.    Discharge Planning  Accompanied by: mother Savannah  Discharge name/phone number: here with pt   Overnight post sedation caregiver: mother  Discharge location: home    Megan Aguilar RN

## 2020-06-24 NOTE — PROGRESS NOTES
PATIENT/VISITOR WELLNESS SCREENING    Step 1 Patient Screening    1. In the last month, have you been in contact with someone who was confirmed or suspected to have Coronavirus/COVID-19? No    2. Do you have the following symptoms?  Fever/Chills? No   Cough? No   Shortness of breath? No   New loss of taste or smell? No  Sore throat? No  Muscle or body aches? No  Headaches? No  Fatigue? No  Vomiting or diarrhea? No    Step 2 Visitor Screening    1. Name of Visitor (1 visitor per patient) Savannah    2. In the last month, have you been in contact with someone who was confirmed or suspected to have Coronavirus/COVID-19? No    3. Do you have the following symptoms?  Fever/Chills? No   Cough? No   Shortness of breath? No   Skin rash? No   Loss of taste or smell? No  Sore throat? No  Runny or stuffy nose? No  Muscle or body aches? No  Headaches? No  Fatigue? No  Vomiting or diarrhea? No    If the visitor has positive symptoms, notify supervisor/manger  Per policy, the visitor will need to leave the facility     Step 3 Refer to logic grid below for actions    NO SYMPTOM(S)    ACTIONS:  1. Standard rooming process  2. Provider to assess per normal protocol  3. Implement precautions as needed and per guidelines     POSITIVE SYMPTOM(S)  If positive for ANY of the following symptoms: fever, cough, shortness of breath, rash    ACTION:  1. Continue to have the patient wear a mask   2. Room patient as soon as possible  3. Don appropriate PPE when entering room  4. Provider evaluation

## 2020-06-24 NOTE — PROCEDURES
[facility]    Procedure: Gj tube exchange.    Date/Time: 6/24/2020 3:04 PM  Performed by: Megan Stout DO  Authorized by: Megan Stout DO     UNIVERSAL PROTOCOL   Site Marked: NA  Prior Images Obtained and Reviewed:  Yes  Required items: Required blood products, implants, devices and special equipment available    Patient identity confirmed:  Verbally with patient, arm band, provided demographic data and hospital-assigned identification number  Patient was reevaluated immediately before administering moderate or deep sedation or anesthesia  Confirmation Checklist:  Patient's identity using two indicators, relevant allergies, procedure was appropriate and matched the consent or emergent situation and correct equipment/implants were available  Time out: Immediately prior to the procedure a time out was called    Universal Protocol: the Joint Commission Universal Protocol was followed    Preparation: Patient was prepped and draped in usual sterile fashion           ANESTHESIA    Anesthesia: Local infiltration  Local Anesthetic:  Lidocaine 1% without epinephrine      SEDATION    Patient Sedated: No    Vital signs: Vital signs monitored during sedation    See dictated procedure note for full details.  Findings: Gj tube exchange.    Specimens: none    Complications: None    Condition: Stable    Plan: Ok to use.     PROCEDURE   Patient Tolerance:  Patient tolerated the procedure well with no immediate complications    Length of time physician/provider present for 1:1 monitoring during sedation: 0

## 2020-07-16 NOTE — PROVIDER NOTIFICATION
FYI- Patient just threw up coffee ground emesis.  Had threwn up about an hour ago too, but was clear. bm yesterday, hypoactive bowels, no signs of bleeding.   el bradshaw    Name band;

## 2020-08-10 ENCOUNTER — HOSPITAL ENCOUNTER (INPATIENT)
Facility: CLINIC | Age: 60
LOS: 4 days | Discharge: HOME HEALTH | DRG: 871 | End: 2020-08-15
Attending: EMERGENCY MEDICINE | Admitting: HOSPITALIST
Payer: MEDICARE

## 2020-08-10 ENCOUNTER — APPOINTMENT (OUTPATIENT)
Dept: GENERAL RADIOLOGY | Facility: CLINIC | Age: 60
DRG: 871 | End: 2020-08-10
Attending: EMERGENCY MEDICINE
Payer: MEDICARE

## 2020-08-10 DIAGNOSIS — A41.9 BACTERIAL SEPSIS (H): ICD-10-CM

## 2020-08-10 DIAGNOSIS — Z20.822 SUSPECTED COVID-19 VIRUS INFECTION: ICD-10-CM

## 2020-08-10 DIAGNOSIS — G40.803 INTRACTABLE EPILEPSY DUE TO EXTERNAL CAUSES WITH STATUS EPILEPTICUS (H): ICD-10-CM

## 2020-08-10 DIAGNOSIS — J69.0 ASPIRATION PNEUMONIA OF RIGHT LOWER LOBE, UNSPECIFIED ASPIRATION PNEUMONIA TYPE (H): ICD-10-CM

## 2020-08-10 DIAGNOSIS — R50.81 FEVER IN OTHER DISEASES: Primary | ICD-10-CM

## 2020-08-10 LAB
ALBUMIN SERPL-MCNC: 3.1 G/DL (ref 3.4–5)
ALBUMIN UR-MCNC: 30 MG/DL
ALP SERPL-CCNC: 108 U/L (ref 40–150)
ALT SERPL W P-5'-P-CCNC: 28 U/L (ref 0–70)
AMORPH CRY #/AREA URNS HPF: ABNORMAL /HPF
ANION GAP SERPL CALCULATED.3IONS-SCNC: 6 MMOL/L (ref 3–14)
APPEARANCE UR: CLEAR
AST SERPL W P-5'-P-CCNC: 16 U/L (ref 0–45)
BASOPHILS # BLD AUTO: 0.1 10E9/L (ref 0–0.2)
BASOPHILS NFR BLD AUTO: 0.4 %
BILIRUB SERPL-MCNC: 0.7 MG/DL (ref 0.2–1.3)
BILIRUB UR QL STRIP: NEGATIVE
BUN SERPL-MCNC: 14 MG/DL (ref 7–30)
CALCIUM SERPL-MCNC: 8.2 MG/DL (ref 8.5–10.1)
CHLORIDE SERPL-SCNC: 90 MMOL/L (ref 94–109)
CO2 SERPL-SCNC: 28 MMOL/L (ref 20–32)
COLOR UR AUTO: YELLOW
CREAT SERPL-MCNC: 0.81 MG/DL (ref 0.66–1.25)
DIFFERENTIAL METHOD BLD: ABNORMAL
EOSINOPHIL # BLD AUTO: 0.4 10E9/L (ref 0–0.7)
EOSINOPHIL NFR BLD AUTO: 2.6 %
ERYTHROCYTE [DISTWIDTH] IN BLOOD BY AUTOMATED COUNT: 13.2 % (ref 10–15)
GFR SERPL CREATININE-BSD FRML MDRD: >90 ML/MIN/{1.73_M2}
GLUCOSE SERPL-MCNC: 107 MG/DL (ref 70–99)
GLUCOSE UR STRIP-MCNC: 30 MG/DL
HCT VFR BLD AUTO: 41.5 % (ref 40–53)
HGB BLD-MCNC: 14.7 G/DL (ref 13.3–17.7)
HGB UR QL STRIP: NEGATIVE
IMM GRANULOCYTES # BLD: 0.1 10E9/L (ref 0–0.4)
IMM GRANULOCYTES NFR BLD: 0.4 %
KETONES UR STRIP-MCNC: NEGATIVE MG/DL
LACTATE BLD-SCNC: 1.8 MMOL/L (ref 0.7–2)
LEUKOCYTE ESTERASE UR QL STRIP: NEGATIVE
LYMPHOCYTES # BLD AUTO: 3 10E9/L (ref 0.8–5.3)
LYMPHOCYTES NFR BLD AUTO: 19.8 %
MCH RBC QN AUTO: 30.1 PG (ref 26.5–33)
MCHC RBC AUTO-ENTMCNC: 35.4 G/DL (ref 31.5–36.5)
MCV RBC AUTO: 85 FL (ref 78–100)
MONOCYTES # BLD AUTO: 1.5 10E9/L (ref 0–1.3)
MONOCYTES NFR BLD AUTO: 9.9 %
NEUTROPHILS # BLD AUTO: 10.1 10E9/L (ref 1.6–8.3)
NEUTROPHILS NFR BLD AUTO: 66.9 %
NITRATE UR QL: NEGATIVE
NRBC # BLD AUTO: 0 10*3/UL
NRBC BLD AUTO-RTO: 0 /100
PH UR STRIP: 8 PH (ref 5–7)
PLATELET # BLD AUTO: 175 10E9/L (ref 150–450)
POTASSIUM SERPL-SCNC: 3.9 MMOL/L (ref 3.4–5.3)
PROT SERPL-MCNC: 7.7 G/DL (ref 6.8–8.8)
RBC # BLD AUTO: 4.88 10E12/L (ref 4.4–5.9)
RBC #/AREA URNS AUTO: 1 /HPF (ref 0–2)
SODIUM SERPL-SCNC: 124 MMOL/L (ref 133–144)
SOURCE: ABNORMAL
SP GR UR STRIP: 1.02 (ref 1–1.03)
UROBILINOGEN UR STRIP-MCNC: 4 MG/DL (ref 0–2)
WBC # BLD AUTO: 15.1 10E9/L (ref 4–11)
WBC #/AREA URNS AUTO: 0 /HPF (ref 0–5)

## 2020-08-10 PROCEDURE — 87040 BLOOD CULTURE FOR BACTERIA: CPT | Performed by: EMERGENCY MEDICINE

## 2020-08-10 PROCEDURE — 83605 ASSAY OF LACTIC ACID: CPT | Performed by: EMERGENCY MEDICINE

## 2020-08-10 PROCEDURE — 81001 URINALYSIS AUTO W/SCOPE: CPT | Performed by: EMERGENCY MEDICINE

## 2020-08-10 PROCEDURE — 99285 EMERGENCY DEPT VISIT HI MDM: CPT | Mod: 25

## 2020-08-10 PROCEDURE — 96375 TX/PRO/DX INJ NEW DRUG ADDON: CPT

## 2020-08-10 PROCEDURE — 96365 THER/PROPH/DIAG IV INF INIT: CPT

## 2020-08-10 PROCEDURE — 25000128 H RX IP 250 OP 636: Performed by: EMERGENCY MEDICINE

## 2020-08-10 PROCEDURE — 25000132 ZZH RX MED GY IP 250 OP 250 PS 637: Mod: GY | Performed by: EMERGENCY MEDICINE

## 2020-08-10 PROCEDURE — C9803 HOPD COVID-19 SPEC COLLECT: HCPCS

## 2020-08-10 PROCEDURE — 80053 COMPREHEN METABOLIC PANEL: CPT | Performed by: EMERGENCY MEDICINE

## 2020-08-10 PROCEDURE — 25800030 ZZH RX IP 258 OP 636: Performed by: EMERGENCY MEDICINE

## 2020-08-10 PROCEDURE — 71045 X-RAY EXAM CHEST 1 VIEW: CPT

## 2020-08-10 PROCEDURE — 87086 URINE CULTURE/COLONY COUNT: CPT | Performed by: EMERGENCY MEDICINE

## 2020-08-10 PROCEDURE — 85025 COMPLETE CBC W/AUTO DIFF WBC: CPT | Performed by: EMERGENCY MEDICINE

## 2020-08-10 PROCEDURE — U0003 INFECTIOUS AGENT DETECTION BY NUCLEIC ACID (DNA OR RNA); SEVERE ACUTE RESPIRATORY SYNDROME CORONAVIRUS 2 (SARS-COV-2) (CORONAVIRUS DISEASE [COVID-19]), AMPLIFIED PROBE TECHNIQUE, MAKING USE OF HIGH THROUGHPUT TECHNOLOGIES AS DESCRIBED BY CMS-2020-01-R: HCPCS | Performed by: EMERGENCY MEDICINE

## 2020-08-10 RX ORDER — PIPERACILLIN SODIUM, TAZOBACTAM SODIUM 4; .5 G/20ML; G/20ML
4.5 INJECTION, POWDER, LYOPHILIZED, FOR SOLUTION INTRAVENOUS ONCE
Status: COMPLETED | OUTPATIENT
Start: 2020-08-10 | End: 2020-08-10

## 2020-08-10 RX ORDER — CARBAMAZEPINE 100 MG/5ML
150 SUSPENSION ORAL ONCE
Status: COMPLETED | OUTPATIENT
Start: 2020-08-10 | End: 2020-08-11

## 2020-08-10 RX ORDER — ACETAMINOPHEN 650 MG/1
650 SUPPOSITORY RECTAL ONCE
Status: COMPLETED | OUTPATIENT
Start: 2020-08-10 | End: 2020-08-10

## 2020-08-10 RX ADMIN — PIPERACILLIN AND TAZOBACTAM 4.5 G: 4; .5 INJECTION, POWDER, FOR SOLUTION INTRAVENOUS at 22:35

## 2020-08-10 RX ADMIN — SODIUM CHLORIDE 2000 ML: 9 INJECTION, SOLUTION INTRAVENOUS at 22:34

## 2020-08-10 RX ADMIN — ACETAMINOPHEN 650 MG: 650 SUPPOSITORY RECTAL at 23:08

## 2020-08-10 RX ADMIN — VANCOMYCIN HYDROCHLORIDE 1500 MG: 5 INJECTION, POWDER, LYOPHILIZED, FOR SOLUTION INTRAVENOUS at 23:40

## 2020-08-11 ENCOUNTER — APPOINTMENT (OUTPATIENT)
Dept: GENERAL RADIOLOGY | Facility: CLINIC | Age: 60
DRG: 871 | End: 2020-08-11
Attending: INTERNAL MEDICINE
Payer: MEDICARE

## 2020-08-11 LAB
ANION GAP SERPL CALCULATED.3IONS-SCNC: 3 MMOL/L (ref 3–14)
ANION GAP SERPL CALCULATED.3IONS-SCNC: 5 MMOL/L (ref 3–14)
BUN SERPL-MCNC: 10 MG/DL (ref 7–30)
BUN SERPL-MCNC: 11 MG/DL (ref 7–30)
CALCIUM SERPL-MCNC: 7.8 MG/DL (ref 8.5–10.1)
CALCIUM SERPL-MCNC: 7.9 MG/DL (ref 8.5–10.1)
CHLORIDE SERPL-SCNC: 106 MMOL/L (ref 94–109)
CHLORIDE SERPL-SCNC: 99 MMOL/L (ref 94–109)
CO2 SERPL-SCNC: 24 MMOL/L (ref 20–32)
CO2 SERPL-SCNC: 27 MMOL/L (ref 20–32)
CREAT SERPL-MCNC: 0.82 MG/DL (ref 0.66–1.25)
CREAT SERPL-MCNC: 0.86 MG/DL (ref 0.66–1.25)
ERYTHROCYTE [DISTWIDTH] IN BLOOD BY AUTOMATED COUNT: 13.4 % (ref 10–15)
GFR SERPL CREATININE-BSD FRML MDRD: >90 ML/MIN/{1.73_M2}
GFR SERPL CREATININE-BSD FRML MDRD: >90 ML/MIN/{1.73_M2}
GLUCOSE SERPL-MCNC: 167 MG/DL (ref 70–99)
GLUCOSE SERPL-MCNC: 80 MG/DL (ref 70–99)
HCT VFR BLD AUTO: 40.4 % (ref 40–53)
HGB BLD-MCNC: 13.6 G/DL (ref 13.3–17.7)
LABORATORY COMMENT REPORT: NORMAL
LACTATE BLD-SCNC: 2.8 MMOL/L (ref 0.7–2)
MCH RBC QN AUTO: 29.4 PG (ref 26.5–33)
MCHC RBC AUTO-ENTMCNC: 33.7 G/DL (ref 31.5–36.5)
MCV RBC AUTO: 87 FL (ref 78–100)
OSMOLALITY SERPL: 278 MMOL/KG (ref 275–295)
OSMOLALITY UR: 269 MMOL/KG (ref 100–1200)
PHOSPHATE SERPL-MCNC: 2.3 MG/DL (ref 2.5–4.5)
PLATELET # BLD AUTO: 166 10E9/L (ref 150–450)
POTASSIUM SERPL-SCNC: 3.9 MMOL/L (ref 3.4–5.3)
POTASSIUM SERPL-SCNC: 4.7 MMOL/L (ref 3.4–5.3)
RBC # BLD AUTO: 4.62 10E12/L (ref 4.4–5.9)
SARS-COV-2 RNA SPEC QL NAA+PROBE: NEGATIVE
SARS-COV-2 RNA SPEC QL NAA+PROBE: NORMAL
SODIUM SERPL-SCNC: 128 MMOL/L (ref 133–144)
SODIUM SERPL-SCNC: 131 MMOL/L (ref 133–144)
SODIUM SERPL-SCNC: 131 MMOL/L (ref 133–144)
SODIUM SERPL-SCNC: 132 MMOL/L (ref 133–144)
SODIUM SERPL-SCNC: 133 MMOL/L (ref 133–144)
SODIUM UR-SCNC: 78 MMOL/L
SPECIMEN SOURCE: NORMAL
SPECIMEN SOURCE: NORMAL
WBC # BLD AUTO: 15.8 10E9/L (ref 4–11)

## 2020-08-11 PROCEDURE — 25000128 H RX IP 250 OP 636: Performed by: INTERNAL MEDICINE

## 2020-08-11 PROCEDURE — 99223 1ST HOSP IP/OBS HIGH 75: CPT | Mod: AI | Performed by: HOSPITALIST

## 2020-08-11 PROCEDURE — 84295 ASSAY OF SERUM SODIUM: CPT | Performed by: INTERNAL MEDICINE

## 2020-08-11 PROCEDURE — 84300 ASSAY OF URINE SODIUM: CPT | Performed by: INTERNAL MEDICINE

## 2020-08-11 PROCEDURE — 25800030 ZZH RX IP 258 OP 636: Performed by: EMERGENCY MEDICINE

## 2020-08-11 PROCEDURE — 80048 BASIC METABOLIC PNL TOTAL CA: CPT | Performed by: INTERNAL MEDICINE

## 2020-08-11 PROCEDURE — 25000132 ZZH RX MED GY IP 250 OP 250 PS 637: Mod: GY | Performed by: HOSPITALIST

## 2020-08-11 PROCEDURE — 36415 COLL VENOUS BLD VENIPUNCTURE: CPT | Performed by: INTERNAL MEDICINE

## 2020-08-11 PROCEDURE — 83605 ASSAY OF LACTIC ACID: CPT | Performed by: INTERNAL MEDICINE

## 2020-08-11 PROCEDURE — 83935 ASSAY OF URINE OSMOLALITY: CPT | Performed by: INTERNAL MEDICINE

## 2020-08-11 PROCEDURE — 84100 ASSAY OF PHOSPHORUS: CPT | Performed by: INTERNAL MEDICINE

## 2020-08-11 PROCEDURE — 25000132 ZZH RX MED GY IP 250 OP 250 PS 637: Mod: GY | Performed by: INTERNAL MEDICINE

## 2020-08-11 PROCEDURE — 99207 ZZC NON-BILLABLE SERV PER CHARTING: CPT | Performed by: INTERNAL MEDICINE

## 2020-08-11 PROCEDURE — 85027 COMPLETE CBC AUTOMATED: CPT | Performed by: INTERNAL MEDICINE

## 2020-08-11 PROCEDURE — 12000011 ZZH R&B MS OVERFLOW

## 2020-08-11 PROCEDURE — 71045 X-RAY EXAM CHEST 1 VIEW: CPT

## 2020-08-11 PROCEDURE — 25800030 ZZH RX IP 258 OP 636: Performed by: INTERNAL MEDICINE

## 2020-08-11 PROCEDURE — 25000125 ZZHC RX 250: Performed by: HOSPITALIST

## 2020-08-11 PROCEDURE — 25000132 ZZH RX MED GY IP 250 OP 250 PS 637: Mod: GY | Performed by: EMERGENCY MEDICINE

## 2020-08-11 PROCEDURE — 25000128 H RX IP 250 OP 636: Performed by: HOSPITALIST

## 2020-08-11 PROCEDURE — 25800030 ZZH RX IP 258 OP 636: Performed by: HOSPITALIST

## 2020-08-11 PROCEDURE — 83930 ASSAY OF BLOOD OSMOLALITY: CPT | Performed by: INTERNAL MEDICINE

## 2020-08-11 PROCEDURE — 27210429 ZZH NUTRITION PRODUCT INTERMEDIATE LITER

## 2020-08-11 RX ORDER — AZITHROMYCIN 500 MG/1
500 INJECTION, POWDER, LYOPHILIZED, FOR SOLUTION INTRAVENOUS EVERY 24 HOURS
Status: DISCONTINUED | OUTPATIENT
Start: 2020-08-11 | End: 2020-08-12

## 2020-08-11 RX ORDER — ASCORBIC ACID 500 MG
1000 TABLET ORAL DAILY
Status: DISCONTINUED | OUTPATIENT
Start: 2020-08-11 | End: 2020-08-11

## 2020-08-11 RX ORDER — ONDANSETRON 2 MG/ML
4 INJECTION INTRAMUSCULAR; INTRAVENOUS EVERY 6 HOURS PRN
Status: DISCONTINUED | OUTPATIENT
Start: 2020-08-11 | End: 2020-08-15 | Stop reason: HOSPADM

## 2020-08-11 RX ORDER — BISACODYL 10 MG
10 SUPPOSITORY, RECTAL RECTAL DAILY PRN
Status: DISCONTINUED | OUTPATIENT
Start: 2020-08-11 | End: 2020-08-15 | Stop reason: HOSPADM

## 2020-08-11 RX ORDER — SCOPOLAMINE HYDROBROMIDE
0.8 POWDER (GRAM) MISCELLANEOUS 3 TIMES DAILY
Status: DISCONTINUED | OUTPATIENT
Start: 2020-08-11 | End: 2020-08-15 | Stop reason: HOSPADM

## 2020-08-11 RX ORDER — NITROGLYCERIN 0.4 MG/1
0.4 TABLET SUBLINGUAL EVERY 5 MIN PRN
Status: DISCONTINUED | OUTPATIENT
Start: 2020-08-11 | End: 2020-08-15 | Stop reason: HOSPADM

## 2020-08-11 RX ORDER — ALBUTEROL SULFATE 0.83 MG/ML
2.5 SOLUTION RESPIRATORY (INHALATION)
Status: DISCONTINUED | OUTPATIENT
Start: 2020-08-11 | End: 2020-08-11

## 2020-08-11 RX ORDER — ASPIRIN 81 MG/1
81 TABLET, CHEWABLE ORAL DAILY
Status: DISCONTINUED | OUTPATIENT
Start: 2020-08-11 | End: 2020-08-15 | Stop reason: HOSPADM

## 2020-08-11 RX ORDER — METOCLOPRAMIDE HYDROCHLORIDE 5 MG/5ML
5 SOLUTION ORAL 2 TIMES DAILY
Status: DISCONTINUED | OUTPATIENT
Start: 2020-08-11 | End: 2020-08-15 | Stop reason: HOSPADM

## 2020-08-11 RX ORDER — LEVOTHYROXINE SODIUM 150 UG/1
150 TABLET ORAL EVERY MORNING
Status: DISCONTINUED | OUTPATIENT
Start: 2020-08-11 | End: 2020-08-15 | Stop reason: HOSPADM

## 2020-08-11 RX ORDER — HYDROCORTISONE 20 MG/1
20 TABLET ORAL EVERY MORNING
Status: DISCONTINUED | OUTPATIENT
Start: 2020-08-11 | End: 2020-08-12

## 2020-08-11 RX ORDER — ASCORBIC ACID/MULTIVIT-MIN 1000 MG
1 EFFERVESCENT POWDER IN PACKET ORAL DAILY
Status: ON HOLD | COMMUNITY
End: 2021-01-15

## 2020-08-11 RX ORDER — AMOXICILLIN 250 MG
2 CAPSULE ORAL 2 TIMES DAILY PRN
Status: DISCONTINUED | OUTPATIENT
Start: 2020-08-11 | End: 2020-08-13

## 2020-08-11 RX ORDER — GUAR GUM
1 PACKET (EA) ORAL DAILY
Status: DISCONTINUED | OUTPATIENT
Start: 2020-08-11 | End: 2020-08-15 | Stop reason: HOSPADM

## 2020-08-11 RX ORDER — POTASSIUM CHLORIDE 7.45 MG/ML
10 INJECTION INTRAVENOUS
Status: DISCONTINUED | OUTPATIENT
Start: 2020-08-11 | End: 2020-08-15 | Stop reason: HOSPADM

## 2020-08-11 RX ORDER — AMOXICILLIN 250 MG
1 CAPSULE ORAL 2 TIMES DAILY PRN
Status: DISCONTINUED | OUTPATIENT
Start: 2020-08-11 | End: 2020-08-13

## 2020-08-11 RX ORDER — CHOLECALCIFEROL (VITAMIN D3) 50 MCG
50 TABLET ORAL DAILY
Status: DISCONTINUED | OUTPATIENT
Start: 2020-08-11 | End: 2020-08-15 | Stop reason: HOSPADM

## 2020-08-11 RX ORDER — POTASSIUM CL/LIDO/0.9 % NACL 10MEQ/0.1L
10 INTRAVENOUS SOLUTION, PIGGYBACK (ML) INTRAVENOUS
Status: DISCONTINUED | OUTPATIENT
Start: 2020-08-11 | End: 2020-08-15 | Stop reason: HOSPADM

## 2020-08-11 RX ORDER — POTASSIUM CHLORIDE 29.8 MG/ML
20 INJECTION INTRAVENOUS
Status: DISCONTINUED | OUTPATIENT
Start: 2020-08-11 | End: 2020-08-15 | Stop reason: HOSPADM

## 2020-08-11 RX ORDER — CARBAMAZEPINE 100 MG/5ML
150 SUSPENSION ORAL EVERY 6 HOURS
Status: DISCONTINUED | OUTPATIENT
Start: 2020-08-11 | End: 2020-08-15 | Stop reason: HOSPADM

## 2020-08-11 RX ORDER — METOPROLOL TARTRATE 1 MG/ML
2.5-5 INJECTION, SOLUTION INTRAVENOUS EVERY 4 HOURS PRN
Status: DISCONTINUED | OUTPATIENT
Start: 2020-08-11 | End: 2020-08-15 | Stop reason: HOSPADM

## 2020-08-11 RX ORDER — ACETAMINOPHEN 650 MG/1
650 SUPPOSITORY RECTAL EVERY 4 HOURS PRN
Status: DISCONTINUED | OUTPATIENT
Start: 2020-08-11 | End: 2020-08-15 | Stop reason: HOSPADM

## 2020-08-11 RX ORDER — PIPERACILLIN SODIUM, TAZOBACTAM SODIUM 3; .375 G/15ML; G/15ML
3.38 INJECTION, POWDER, LYOPHILIZED, FOR SOLUTION INTRAVENOUS EVERY 6 HOURS
Status: DISCONTINUED | OUTPATIENT
Start: 2020-08-11 | End: 2020-08-15 | Stop reason: HOSPADM

## 2020-08-11 RX ORDER — LIDOCAINE 40 MG/G
CREAM TOPICAL
Status: DISCONTINUED | OUTPATIENT
Start: 2020-08-11 | End: 2020-08-15 | Stop reason: HOSPADM

## 2020-08-11 RX ORDER — SODIUM BICARBONATE 650 MG/1
650 TABLET ORAL DAILY
Status: DISCONTINUED | OUTPATIENT
Start: 2020-08-11 | End: 2020-08-15 | Stop reason: HOSPADM

## 2020-08-11 RX ORDER — ACETYLCYSTEINE 200 MG/ML
2 SOLUTION ORAL; RESPIRATORY (INHALATION) 4 TIMES DAILY
Status: DISCONTINUED | OUTPATIENT
Start: 2020-08-11 | End: 2020-08-11

## 2020-08-11 RX ORDER — POTASSIUM CHLORIDE 1.5 G/1.58G
20-40 POWDER, FOR SOLUTION ORAL
Status: DISCONTINUED | OUTPATIENT
Start: 2020-08-11 | End: 2020-08-15 | Stop reason: HOSPADM

## 2020-08-11 RX ORDER — ASCORBIC ACID/MULTIVIT-MIN 1000 MG
1 EFFERVESCENT POWDER IN PACKET ORAL DAILY
Status: DISCONTINUED | OUTPATIENT
Start: 2020-08-11 | End: 2020-08-15 | Stop reason: HOSPADM

## 2020-08-11 RX ORDER — MAGNESIUM SULFATE HEPTAHYDRATE 40 MG/ML
4 INJECTION, SOLUTION INTRAVENOUS EVERY 4 HOURS PRN
Status: DISCONTINUED | OUTPATIENT
Start: 2020-08-11 | End: 2020-08-15 | Stop reason: HOSPADM

## 2020-08-11 RX ORDER — LABETALOL HYDROCHLORIDE 5 MG/ML
10 INJECTION, SOLUTION INTRAVENOUS
Status: DISCONTINUED | OUTPATIENT
Start: 2020-08-11 | End: 2020-08-15 | Stop reason: HOSPADM

## 2020-08-11 RX ORDER — HYDROCORTISONE 10 MG/1
10 TABLET ORAL
Status: DISCONTINUED | OUTPATIENT
Start: 2020-08-11 | End: 2020-08-12

## 2020-08-11 RX ORDER — NALOXONE HYDROCHLORIDE 0.4 MG/ML
.1-.4 INJECTION, SOLUTION INTRAMUSCULAR; INTRAVENOUS; SUBCUTANEOUS
Status: DISCONTINUED | OUTPATIENT
Start: 2020-08-11 | End: 2020-08-15 | Stop reason: HOSPADM

## 2020-08-11 RX ORDER — ASCORBIC ACID 500 MG
1000 TABLET ORAL DAILY
Status: DISCONTINUED | OUTPATIENT
Start: 2020-08-11 | End: 2020-08-15 | Stop reason: HOSPADM

## 2020-08-11 RX ORDER — LIDOCAINE 40 MG/G
CREAM TOPICAL
Status: DISCONTINUED | OUTPATIENT
Start: 2020-08-11 | End: 2020-08-11

## 2020-08-11 RX ORDER — ONDANSETRON 4 MG/1
4 TABLET, ORALLY DISINTEGRATING ORAL EVERY 6 HOURS PRN
Status: DISCONTINUED | OUTPATIENT
Start: 2020-08-11 | End: 2020-08-15 | Stop reason: HOSPADM

## 2020-08-11 RX ORDER — POLYETHYLENE GLYCOL 3350 17 G/17G
17 POWDER, FOR SOLUTION ORAL DAILY PRN
Status: DISCONTINUED | OUTPATIENT
Start: 2020-08-11 | End: 2020-08-13

## 2020-08-11 RX ORDER — ACETAMINOPHEN 650 MG/1
650 SUPPOSITORY RECTAL ONCE
Status: COMPLETED | OUTPATIENT
Start: 2020-08-11 | End: 2020-08-11

## 2020-08-11 RX ORDER — SODIUM CHLORIDE 9 MG/ML
INJECTION, SOLUTION INTRAVENOUS CONTINUOUS
Status: DISCONTINUED | OUTPATIENT
Start: 2020-08-11 | End: 2020-08-11

## 2020-08-11 RX ORDER — DEXTROSE MONOHYDRATE 50 MG/ML
INJECTION, SOLUTION INTRAVENOUS CONTINUOUS
Status: DISCONTINUED | OUTPATIENT
Start: 2020-08-11 | End: 2020-08-11

## 2020-08-11 RX ORDER — POTASSIUM CHLORIDE 1500 MG/1
20-40 TABLET, EXTENDED RELEASE ORAL
Status: DISCONTINUED | OUTPATIENT
Start: 2020-08-11 | End: 2020-08-15 | Stop reason: HOSPADM

## 2020-08-11 RX ORDER — ACETAMINOPHEN 325 MG/1
650 TABLET ORAL EVERY 4 HOURS PRN
Status: DISCONTINUED | OUTPATIENT
Start: 2020-08-11 | End: 2020-08-13

## 2020-08-11 RX ADMIN — OXYCODONE HYDROCHLORIDE AND ACETAMINOPHEN 1000 MG: 500 TABLET ORAL at 10:08

## 2020-08-11 RX ADMIN — POTASSIUM & SODIUM PHOSPHATES POWDER PACK 280-160-250 MG 1 PACKET: 280-160-250 PACK at 22:03

## 2020-08-11 RX ADMIN — Medication 0.8 MG: at 22:04

## 2020-08-11 RX ADMIN — ACETAMINOPHEN 650 MG: 650 SUPPOSITORY RECTAL at 13:36

## 2020-08-11 RX ADMIN — SODIUM CHLORIDE 500 ML: 9 INJECTION, SOLUTION INTRAVENOUS at 17:29

## 2020-08-11 RX ADMIN — BRIVARACETAM 100 MG: 10 SOLUTION ORAL at 22:02

## 2020-08-11 RX ADMIN — METOPROLOL TARTRATE 5 MG: 5 INJECTION INTRAVENOUS at 23:37

## 2020-08-11 RX ADMIN — DEXTROSE MONOHYDRATE: 50 INJECTION, SOLUTION INTRAVENOUS at 10:54

## 2020-08-11 RX ADMIN — CARBAMAZEPINE 150 MG: 100 SUSPENSION ORAL at 16:20

## 2020-08-11 RX ADMIN — ACETAMINOPHEN 650 MG: 325 TABLET, FILM COATED ORAL at 22:08

## 2020-08-11 RX ADMIN — SODIUM CHLORIDE 1000 ML: 9 INJECTION, SOLUTION INTRAVENOUS at 17:45

## 2020-08-11 RX ADMIN — CARBAMAZEPINE 150 MG: 100 SUSPENSION ORAL at 22:02

## 2020-08-11 RX ADMIN — PIPERACILLIN SODIUM AND TAZOBACTAM SODIUM 3.38 G: 3; .375 INJECTION, POWDER, LYOPHILIZED, FOR SOLUTION INTRAVENOUS at 23:37

## 2020-08-11 RX ADMIN — METOCLOPRAMIDE 5 MG: 5 SOLUTION ORAL at 22:03

## 2020-08-11 RX ADMIN — PANTOPRAZOLE SODIUM 40 MG: 40 TABLET, DELAYED RELEASE ORAL at 10:06

## 2020-08-11 RX ADMIN — Medication 1 PACKET: at 10:15

## 2020-08-11 RX ADMIN — BRIVARACETAM 100 MG: 10 SOLUTION ORAL at 10:06

## 2020-08-11 RX ADMIN — HYDROCORTISONE 10 MG: 10 TABLET ORAL at 16:22

## 2020-08-11 RX ADMIN — DEXTROSE AND SODIUM CHLORIDE: 5; 900 INJECTION, SOLUTION INTRAVENOUS at 22:18

## 2020-08-11 RX ADMIN — POTASSIUM & SODIUM PHOSPHATES POWDER PACK 280-160-250 MG 1 PACKET: 280-160-250 PACK at 16:21

## 2020-08-11 RX ADMIN — VANCOMYCIN HYDROCHLORIDE 1500 MG: 5 INJECTION, POWDER, LYOPHILIZED, FOR SOLUTION INTRAVENOUS at 19:22

## 2020-08-11 RX ADMIN — SODIUM CHLORIDE 1000 ML: 9 INJECTION, SOLUTION INTRAVENOUS at 01:44

## 2020-08-11 RX ADMIN — PIPERACILLIN SODIUM AND TAZOBACTAM SODIUM 3.38 G: 3; .375 INJECTION, POWDER, LYOPHILIZED, FOR SOLUTION INTRAVENOUS at 10:58

## 2020-08-11 RX ADMIN — ASPIRIN 81 MG 81 MG: 81 TABLET ORAL at 09:52

## 2020-08-11 RX ADMIN — SODIUM BICARBONATE 650 MG TABLET 650 MG: at 09:51

## 2020-08-11 RX ADMIN — MINERAL SUPPLEMENT IRON 300 MG / 5 ML STRENGTH LIQUID 100 PER BOX UNFLAVORED 220 MG: at 11:06

## 2020-08-11 RX ADMIN — OXYCODONE HYDROCHLORIDE AND ACETAMINOPHEN 1000 MG: 500 TABLET ORAL at 09:51

## 2020-08-11 RX ADMIN — POTASSIUM & SODIUM PHOSPHATES POWDER PACK 280-160-250 MG 1 PACKET: 280-160-250 PACK at 10:58

## 2020-08-11 RX ADMIN — CARBAMAZEPINE 150 MG: 100 SUSPENSION ORAL at 10:06

## 2020-08-11 RX ADMIN — PIPERACILLIN SODIUM AND TAZOBACTAM SODIUM 3.38 G: 3; .375 INJECTION, POWDER, LYOPHILIZED, FOR SOLUTION INTRAVENOUS at 17:29

## 2020-08-11 RX ADMIN — LEVOTHYROXINE SODIUM 150 MCG: 150 TABLET ORAL at 09:52

## 2020-08-11 RX ADMIN — PIPERACILLIN SODIUM AND TAZOBACTAM SODIUM 3.38 G: 3; .375 INJECTION, POWDER, LYOPHILIZED, FOR SOLUTION INTRAVENOUS at 04:59

## 2020-08-11 RX ADMIN — SODIUM CHLORIDE: 9 INJECTION, SOLUTION INTRAVENOUS at 03:16

## 2020-08-11 RX ADMIN — CARBAMAZEPINE 150 MG: 100 SUSPENSION ORAL at 00:19

## 2020-08-11 RX ADMIN — ACETAMINOPHEN 650 MG: 650 SUPPOSITORY RECTAL at 09:48

## 2020-08-11 RX ADMIN — AZITHROMYCIN MONOHYDRATE 500 MG: 500 INJECTION, POWDER, LYOPHILIZED, FOR SOLUTION INTRAVENOUS at 16:14

## 2020-08-11 RX ADMIN — Medication 0.8 MG: at 16:28

## 2020-08-11 RX ADMIN — METOCLOPRAMIDE 5 MG: 5 SOLUTION ORAL at 11:02

## 2020-08-11 RX ADMIN — HYDROCORTISONE 20 MG: 20 TABLET ORAL at 10:57

## 2020-08-11 RX ADMIN — HYDROCORTISONE SODIUM SUCCINATE 100 MG: 100 INJECTION, POWDER, FOR SOLUTION INTRAMUSCULAR; INTRAVENOUS at 22:02

## 2020-08-11 RX ADMIN — Medication 50 MCG: at 09:52

## 2020-08-11 ASSESSMENT — ACTIVITIES OF DAILY LIVING (ADL)
ADLS_ACUITY_SCORE: 43
ADLS_ACUITY_SCORE: 42

## 2020-08-11 NOTE — ED NOTES
Bed: ED07  Expected date: 8/10/20  Expected time: 9:30 PM  Means of arrival: Ambulance  Comments:  Kerri M3 59M fever, poss.sepsis

## 2020-08-11 NOTE — ED PROVIDER NOTES
"  History     Chief Complaint:  Fever    The history is provided by the EMS personnel and a parent. 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 On chart review, the patient has a history of recurrent aspiration pneumonia with sepsis. EMS reports the patient began having a fever this morning, which would reduce to 100 F after Advil. The patient has also had a congested cough with rales. Due to his history of recurrent aspiration pneumonia, EMS was called for evaluation. On EMS arrival, the patient's heart rate was 115, blood pressure 106/60, and he was 94% on room air with a blood glucose of 165.     Here, his mother states his breathing is \"raspy\", which is similar to when he had aspiration pneumonia. She denies any emesis, diarrhea, known areas of pain, change in urine production without foul odor or abnormal color.     Of note, the patient lives at home with his mother but he has multiple home nurses coming into the home.     Allergies:  Dilantin [Phenytoin Sodium]  Scopolamine  Valproic Acid      Medications:    Aspirin 81 mg   Synthroid  Melatonin  Reglan  Protonix  Depotestosterone      Past Medical History:    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 traumatic brain injury  Deep gabe thrombosis  GERD  Panhypopituitarism  Pneumonia  Seizures  Septic shock  Spastic hemiplegia affecting dominant side  Hypothyroidism   UTI  Cerebral artery occlusion with cerebral infarction  Ventricular fibrillation  Ventricular tachyarrhythmia   Cardiac arrest  Necrotizing pancreatitis   Systemic inflammatory response syndrome   Asthma  Peptic disease     Past Surgical History:    EGD x 5  Reconstructive facial surgery "   Gastro jejunostomy tube change x 7  PICC exchange left  Appendectomy  Laparoscopic assisted insertion tube gastrotomy   Orthopedic surgery, right hand repair  Tracheostomy x 2   Vascular surgery   Contracture release, right arm and ankle    Family History:    Father - Cancer     Social History:  The patient was accompanied to the ED by EMS and mother.  Smoking Status: Former, 1989  Smokeless tobacco: Never Used  Alcohol Use: No  Drug Use: No  PCP:  Carlos Gomez   Marital Status:  Single [1]     Review of Systems   Unable to perform ROS: Patient nonverbal     Physical Exam     Patient Vitals for the past 24 hrs:   BP Temp Temp src Pulse Heart Rate Resp SpO2   08/10/20 2328 -- -- -- -- 105 30 98 %   08/10/20 2300 120/60 -- -- 109 109 (!) 35 97 %   08/10/20 2200 -- -- -- -- 116 23 95 %   08/10/20 2134 -- 102.9  F (39.4  C) Oral 114 -- 20 98 %     Physical Exam  Nursing note and vitals reviewed.  Constitutional:  Appears well-developed and well-nourished.   HENT:   Head:    Atraumatic.   Mouth/Throat:   Dry mucous membranes. No oropharyngeal exudate.   Eyes:    Pupils are equal, round, and reactive to light.   Neck:    Normal range of motion. Neck supple.      No tracheal deviation present. No thyromegaly present.   Cardiovascular:  Normal rate, regular rhythm, no murmur   Pulmonary/Chest: Breath sounds are clear and equal without wheezes or crackles.  Abdominal:   Soft. Bowel sounds are normal. Exhibits no distension and      no mass. There is no tenderness.      There is no rebound and no guarding.      G-tube in place. The skin around the G tube site appears normal without redness or drainage. Old scar to the abdomen.   Musculoskeletal:  Exhibits no edema. Contractures to his arms and legs.   Lymphadenopathy:  No cervical adenopathy.   Neurological:   Awake and alert. Trying to talk, but unintelligible speech per his baseline.   Skin:    Skin is warm and dry. No rash noted. No pallor.      Emergency Department  Course     Imaging:  Radiology findings were communicated with the patient, family, and Admitting MD who voiced understanding of the findings.    XR Chest Port 1 View   IMPRESSION: Stable cardia mediastinal silhouette. Shallow inspiration and slight elevation right hemidiaphragm. Mild right basilar atelectasis or infiltrate.. No vascular congestion or significant effusion.  Reading per radiology.      Laboratory:  Laboratory findings were communicated with the patient, family and Admitting MD who voiced understanding of the findings.    CBC: WBC 15.1 (H) o/w WNL (HGB 14.7, )  CMP:  (L), Chloride 90 (L), Glucose 107 (H), Calcium 8.2 (L), Albumin 3.1 (L) o/w WNL (Creatinine 0.81)   Lactic Acid (Collected at 2159): 1.8   Blood Culture x2: Pending     UA with Microscopic: Glucose 30 (A), pH 8.0 (H), Protein albumin 30 (A), Urobilinogen mg/dL 4.0 (H), Amorphous crystals Few (A) o/w WNL   Urine culture: Pending.     COVID-19 Virus (Coronavirus) by PCR: Pending.      Interventions:  2234 0.9% NaCl bolus 2000 mL IV   2235 Zosyn 4.5 g IV  2308 Tylenol Suppository 650 mg Rectally  2340 Vancomycin 1500 mg IV  0019 Tegretol suspension 150 mg Per Feeding Tube  0144 0.9% NaCl bolus 1000 mL IV     Emergency Department Course:  Past medical records, nursing notes, and vitals reviewed.  The patient was sent for a XR Chest Port 1 View while in the emergency department, results above.    IV was inserted and blood was drawn for laboratory testing, results above.   The patient provided a urine sample here in the emergency department. This was sent for laboratory testing, findings above.   A nasal swab was obtained for laboratory testing, findings above.      (2229)   I performed an exam of the patient as documented above. History obtained from patient's family and EMS personnel.     (2346)   I rechecked the patient and discussed results and plan of care.     (0199)   I spoke with Dr. Epperson of the Hospitalist service  regarding patient's presentation, findings, and plan of care.     (0019)   I rechecked the patient and discussed results with his mother.     (0051)   I rechecked the patient.     (0130)   I rechecked the patient.     Findings and plan explained to the Patient and mother who consents to admission. Discussed the patient with Dr. Epperson, who will admit the patient to a medical bed for further monitoring, evaluation, and treatment.  I personally reviewed the laboratory and imaging results with the Patient and mother and answered all related questions prior to admission.     Impression & Plan     Covid-19  Keyon Farias was evaluated during a global COVID-19 pandemic, which necessitated consideration that the patient might be at risk for infection with the SARS-CoV-2 virus that causes COVID-19.   Applicable protocols for evaluation were followed during the patient's care.   COVID-19 was considered as part of the patient's evaluation. The plan for testing is:  a test was obtained during this visit.     Medical Decision Making:  This patient has a history of recurrent aspiration pneumonia. He arrives here with a high fever and tachycardia with an elevated white blood cell count. I found him to have acute sepsis syndrome, which I feel is due to recurrent aspiration pneumonia. There was no sign of cellulitis on my exam and no sign of UTI on his urinalysis. I also considered COVID-19 infection in the differential. He is admitted to the care of the hospitalist for further evaluation and treatment.    Diagnosis:    ICD-10-CM    1. Bacterial sepsis (H)  A41.9    2. Aspiration pneumonia of right lower lobe, unspecified aspiration pneumonia type (H)  J69.0    3. Suspected COVID-19 virus infection  Z20.828      Disposition:  Admitted to a medical bed under the care of Dr. Epperson.     Scribe Disclosure:  Yaron DYKES, am serving as a scribe at 10:02 PM on 8/10/2020 to document services personally performed by Sahara Miles  MD Jorge Luis based on my observations and the provider's statements to me.    8/10/2020    EMERGENCY DEPARTMENT       Sahara Miles MD  08/11/20 [id number]

## 2020-08-11 NOTE — PROGRESS NOTES
Called family regarding home supplied medication. Patient mom is going to drop them in door #2 sometime later today.

## 2020-08-11 NOTE — PHARMACY-VANCOMYCIN DOSING SERVICE
Pharmacy Vancomycin Initial Note  Date of Service 2020  Patient's  1960  60 year old, male    Indication: Community Acquired Pneumonia and Sepsis    Current estimated CrCl = Estimated Creatinine Clearance: 99.7 mL/min (based on SCr of 0.86 mg/dL).    Creatinine for last 3 days  8/10/2020:  9:53 PM Creatinine 0.81 mg/dL  2020:  8:17 AM Creatinine 0.86 mg/dL    Recent Vancomycin Level(s) for last 3 days  No results found for requested labs within last 72 hours.      Vancomycin IV Administrations (past 72 hours)                   vancomycin 1500 mg in 0.9% NaCl 250 ml intermittent infusion 1,500 mg (mg) 1,500 mg Given 08/10/20 2340                Nephrotoxins and other renal medications (From now, onward)    Start     Dose/Rate Route Frequency Ordered Stop    20 1800  vancomycin 1500 mg in 0.9% NaCl 250 ml intermittent infusion 1,500 mg      1,500 mg  over 90 Minutes Intravenous EVERY 12 HOURS 20 1733      20 0500  piperacillin-tazobactam (ZOSYN) 3.375 g vial to attach to  mL bag      3.375 g  over 30 Minutes Intravenous EVERY 6 HOURS 20 0212            Contrast Orders - past 72 hours (72h ago, onward)    None                Plan:  1.  Start vancomycin  1500 mg IV q12h. Note patient received one-time dose of vancomycin in ED 8/10 PM (~20 hours ago)  2.  Goal Trough Level: 15-20 mg/L   3.  Pharmacy will check trough levels as appropriate in 1-3 Days.    4. Serum creatinine levels will be ordered daily for the first week of therapy and at least twice weekly for subsequent weeks.    5. [place] method utilized to dose vancomycin therapy: Method 2    Stephany Mckay, PharmD

## 2020-08-11 NOTE — PROVIDER NOTIFICATION
MD Notification    Notified Person: MD    Notified Person Name: Dr Barahona    Notification Date/Time:    Notification Interaction: paged    Purpose of Notification: FYI: Temp of 102.3, PRN Tylenol administered. COVID result negative. Na result pending.  Thank you.    Orders Received:    Comments:

## 2020-08-11 NOTE — PROGRESS NOTES
Patient non verbal. Alert. VsS,RA ec temp was 102.2, PRN Tylenol administered. CovID negative x1, recheck in 48-72 hours. Repeat chest x-ray this am, in IV abx for pneumonia. IV 75 ml/hr D50 infusing. Tube feeding 100 ml/hr. , improving, Na check Q 4 hours.Frequent productive cough, unable to cough, oral care done, refused suctioning. Total care, incontinent of bladder. Has legal guardian. Continue to monitor.

## 2020-08-11 NOTE — PROGRESS NOTES
RECEIVING UNIT ED HANDOFF REVIEW    ED Nurse Handoff Report was reviewed by: Serena Padilla RN on August 11, 2020 at 12:34 AM

## 2020-08-11 NOTE — ED NOTES
[facility]  ED Nurse Handoff Report    ED Chief complaint: Fever      ED Diagnosis:   Final diagnoses:   None       Code Status: Full Code    Allergies:   Allergies   Allergen Reactions     Dilantin [Phenytoin Sodium]      Scopolamine Hives     Hives in response to scopolamine PATCH      Valproic Acid      Toxicity c bone marrow suspension, elevated ammonia levels        Patient Story: Patient from home.  Fever started this morning.  Advil given.  Temp down to 100.    Hx of fever with pneumonia.  Rales, congested cough.   Temp 103.4 at home. 115.  /60. 94% room air.  Blood glucose 165.  Focused Assessment:  fever    Treatments and/or interventions provided: tylenol rectal, rocephin, vanco  Patient's response to treatments and/or interventions:     To be done/followed up on inpatient unit:      Does this patient have any cognitive concerns?: alert     Activity level - Baseline/Home:  Total Care  Activity Level - Current:   Total Care    Patient's Preferred language: English   Needed?: No    Isolation: None and Other: covid r/o  Infection: Not Applicable  MRSA  Bariatric?: No    Vital Signs:   Vitals:    08/10/20 2134   Pulse: 114   Resp: 20   Temp: 102.9  F (39.4  C)   TempSrc: Oral   SpO2: 98%       Cardiac Rhythm:     Was the PSS-3 completed:   Yes  What interventions are required if any?               Family Comments:   OBS brochure/video discussed/provided to patient/family: Yes              Name of person given brochure if not patient:               Relationship to patient:     For the majority of the shift this patient's behavior was Green.   Behavioral interventions performed were .    ED NURSE PHONE NUMBER: er

## 2020-08-11 NOTE — PHARMACY-ADMISSION MEDICATION HISTORY
Pharmacy Medication History  Admission medication history interview status for the 8/10/2020  admission is complete. See EPIC admission navigator for prior to admission medications     Medication history sources: Patient's family/friend (Savannah, mother)  Medication history source reliability: Good  Adherence assessment: Good    Significant changes made to the medication list:        Additional medication history information:       Medication reconciliation completed by provider prior to medication history? Yes    Time spent in this activity: 15 minutes      Prior to Admission medications    Medication Sig Last Dose Taking? Auth Provider   acetylcysteine (MUCOMYST) 20 % neb solution Take 2 mLs by nebulization 4 times daily With albuterol at 0700, 1100, 1500, and 1900  8/10/2020 Yes Unknown, Entered By History   albuterol (PROVENTIL) (5 MG/ML) 0.5% neb solution Take 2.5 mg by nebulization every 4 hours (while awake) 0700 1100 1500 1900 with mucomyst  8/10/2020 Yes Unknown, Entered By History   aspirin (ASA) 81 MG chewable tablet 81 mg by Oral or Feeding Tube route daily At 0900 8/10/2020 Yes Unknown, Entered By History   bacitracin ointment Apply topically daily as needed for wound care To PEG site.  prn Yes Unknown, Entered By History   Brivaracetam (BRIVIACT) 10 MG/ML solution 100 mg by Oral or Feeding Tube route 2 times daily 0900, 2100 8/10/2020 Yes Unknown, Entered By History   calcium carbonate 1250 (500 CA) MG/5ML SUSP suspension 5 mLs (1,250 mg) by Per J Tube route 3 times daily (with meals) 8/10/2020 Yes Mariana Venegas MD   carBAMazepine (TEGRETOL) 100 MG/5ML suspension 150 mg by Oral or Feeding Tube route every 6 hours At 06:00, 12:00, 18:00 and 24:00 for seizures  8/10/2020 Yes Unknown, Entered By History   ferrous sulfate 220 (44 Fe) MG/5ML ELIX 220 mg by Per Feeding Tube route daily 8/10/2020 Yes Unknown, Entered By History   Guar Gum (FIBER MODULAR, NUTRISOURCE FIBER,) packet 1 packet by Per G  Tube route daily 8/10/2020 Yes Starr Champion MD   hydrocortisone (CORTEF) 5 MG tablet 10 mg by Oral or FT or NG tube route daily (with dinner) At 1500  Yes Unknown, Entered By History   hydrocortisone (CORTEF) 5 MG tablet 20 mg by Oral or FT or NG tube route every morning  8/10/2020 Yes Unknown, Entered By History   hydrocortisone 1 % CREA cream Place rectally 2 times daily as needed for other Apply to reddened memo areas as needed prn Yes Unknown, Entered By History   levothyroxine (SYNTHROID/LEVOTHROID) 150 MCG tablet Take 150 mcg by mouth every morning 8/10/2020 at 05:00 Yes Unknown, Entered By History   melatonin (MELATONIN) 1 MG/ML LIQD liquid 6 mg by Per NG tube route At Bedtime   Yes Unknown, Entered By History   metoclopramide (REGLAN) 5 MG/5ML solution 5 mg by Per Feeding Tube route 2 times daily 8/10/2020 Yes Unknown, Entered By History   miconazole (MICATIN) 2 % AERP powder Apply topically 2 times daily as needed  prn Yes Unknown, Entered By History   Multiple Vitamins-Minerals (EMERGEN-C VITAMIN C) PACK 1 packet by Oral or Feeding Tube route daily Vitamin C 1000 mg 8/10/2020 Yes Unknown, Entered By History   mupirocin (BACTROBAN) 2 % external ointment Apply topically 2 times daily as needed  prn Yes Reported, Patient   pantoprazole (PROTONIX) 2 mg/mL SUSP suspension 20 mLs (40 mg) by Per J Tube route daily 8/10/2020 Yes Washington Connors MD   potassium & sodium phosphates (NEUTRA-PHOS) 280-160-250 MG Packet Take 1 packet by mouth 3 times daily  8/10/2020 Yes Yanely Liriano MD   Scopolamine HBr POWD Dispense #90. Mix contents with small amount of water for admin via J-tube.  Administer 0.8 mg three times each day. 8/10/2020 Yes Jennie Bermudez MD   Skin Protectants, Misc. (BALMEX SKIN PROTECTANT) OINT Externally apply topically 2 times daily as needed (irritation) Applay to reddened memo areas twice daily as needed prn Yes Unknown, Entered By History   sodium bicarbonate 650 MG tablet Take 1  tablet (650 mg) by mouth daily 8/10/2020 Yes Ricky Torres MD   testosterone cypionate (DEPOTESTOTERONE CYPIONATE) 200 MG/ML injection Inject 76 mg into the muscle See Admin Instructions Every 2 weeks on Thursdays  76 mg or 0.38 mL 8/6/2020 Yes Unknown, Entered By History   vitamin D3 (CHOLECALCIFEROL) 2000 units (50 mcg) tablet Take 2,000 Units by mouth daily Crush and feed via j-tube @@ 0900 8/10/2020 Yes Unknown, Entered By History   order for DME Equipment being ordered: Nebulizer   Starr Champion MD

## 2020-08-11 NOTE — CONSULTS
"CLINICAL NUTRITION SERVICES  -  ASSESSMENT NOTE    Recommendations Ordered by Registered Dietitian (RD):   Recommend TF as follows (using formulary equivalent):     Type of Feeding Tube: JT (chronic, last replaced in Jan 2020)    Enteral Frequency:  Cycled during the day    Enteral Regimen: Isosource 1.5 at 100 mL/hr x 12 hours    Run 8 am - 8 pm    Total Enteral Provisions:     1200 mL provides 1800 kcal, 82 gm protein, 211 gm CHO, 18 gm fiber and 912 mL H20.    Add 1 pkt NutriSource Fiber for 3 g additional fiber    Meets > 100% of DRI's.    Free water flush of 60 mL q4 hours, increase to 200 mL q4 hours once IVF off    Add 60 ml before and after each cycle       Daily electrolyte check, Phos add on    Daily weights    OK to start at goal   Malnutrition:   % Weight Loss:  None noted  % Intake:  Decreased intake does not meet criteria for malnutrition   Subcutaneous Fat Loss:  Unable to assess. Suspect baseline  Muscle Loss:  Unable to assess. Suspect baseline   Fluid Retention:  None noted    Malnutrition Diagnosis: Patient does not meet two of the above criteria necessary for diagnosing malnutrition     REASON FOR ASSESSMENT  Keyon Farias is a 60 year old male seen by Registered Dietitian for Admission Nutrition Risk Screen for tube feeding or parenteral nutrition and Provider Order - Registered Dietitian to Assess and Order TF per Medical Nutrition Therapy Protocol    NUTRITION HISTORY  - Information obtained from EMR and phone call with patient's mother, Savannah.   - Patient is well-known to this service given frequent admissions.   - Admitted with \"raspy\" breathing similar to prior episodes of aspiration pneumonia. No recent vomiting, diarrhea.     - Spoke to Savannah this morning to obtain enteral regimen -->                - Via JT, HOB elevated (Last replaced 6/24)               - Jevity 1.5 @ 90 - 100 ml/hr x 12 hours. Savannah notes that when she notices Keyon's stomach getting full she will turn off the TF " for about 1 hour. Most days he will still get the full 12 hours of nutrition.               - flush 60 ml each time feeding is turned on or off and with meds. Savannah notes that the home care RNs typically manage the flushes so she is not sure how much he gets daily.    - Patient receives NeutraPhos TID and NeutraSource Fiber 1 pkt daily     CURRENT NUTRITION ORDERS  Diet Order:     NPO - baseline    Current Intake/Tolerance:  Home EN currently on hold given hospitalization.     NUTRITION FOCUSED PHYSICAL ASSESSMENT FOR DIAGNOSING MALNUTRITION)  No:  Patient currently admitted to [facility] Rule Out unit, unable to obtain           Obtained from Chart/Interdisciplinary Team:  Ricci - Nutrition 3; Total 13  Last BM PTA    ANTHROPOMETRICS  Height: 6'  Weight: 166 lbs 0 oz (75.3 kg)   Body mass index is 22.51 kg/m .  Weight Status:  Normal BMI  IBW: 80.9 kg  % IBW: 93%  Weight History: Weight appears stable. Savannah notes that 166# sounds about right.   Wt Readings from Last 10 Encounters:   08/11/20 75.3 kg (166 lb)   05/17/20 67.2 kg (148 lb 2.4 oz)   05/06/20 74.2 kg (163 lb 8 oz)   03/26/20 74.8 kg (165 lb)   02/21/20 70.4 kg (155 lb 3.3 oz)   02/12/20 72.9 kg (160 lb 11.5 oz)   01/30/20 74.8 kg (165 lb)   01/29/20 74.8 kg (165 lb)   01/02/20 71.7 kg (158 lb)   12/25/19 75.1 kg (165 lb 9.1 oz)       LABS  Labs reviewed  Na 124 (H)    MEDICATIONS  Medications reviewed  Nutrisource Fiber - 1 pkt daily   Reglan   NeutraPhos 1 packet TID  Vitamin C 1000 mg + Vitamin C 1 tablet  Vitamin D3 50 mcg  NaCl IVF @ 75 mL/hr    ASSESSED NUTRITION NEEDS PER APPROVED PRACTICE GUIDELINES:  Dosing Weight 75.3 kg   Estimated Energy Needs: 9057-0806 kcals (20-25 Kcal/Kg)  Justification: maintenance  Estimated Protein Needs:  grams protein (1.2-1.5 g pro/Kg)  Justification: maintenance and Repletion  Estimated Fluid Needs: 1 mL/kcal  Justification: maintenance    MALNUTRITION:  % Weight Loss:  None noted  % Intake:  Decreased intake  does not meet criteria for malnutrition   Subcutaneous Fat Loss:  Unable to assess. Suspect baseline  Muscle Loss:  Unable to assess. Suspect baseline   Fluid Retention:  None noted    Malnutrition Diagnosis: Patient does not meet two of the above criteria necessary for diagnosing malnutrition    NUTRITION DIAGNOSIS:  Inadequate enteral nutrition infusion related to hospitalizatoin as evidenced by EN on hold, RD consulted to order per home regimen.     NUTRITION INTERVENTIONS  Recommendations / Nutrition Prescription  Recommend TF as follows (using formulary equivalent):     Type of Feeding Tube: JT (chronic, last replaced in Jan 2020)    Enteral Frequency:  Cycled during the day    Enteral Regimen: Isosource 1.5 at 100 mL/hr x 12 hours    Run 8 am - 8 pm    Total Enteral Provisions:     1200 mL provides 1800 kcal, 82 gm protein, 211 gm CHO, 18 gm fiber and 912 mL H20.    Add 1 pkt NutriSource Fiber for 3 g additional fiber    Meets > 100% of DRI's.    Free water flush of 60 mL q4 hours, increase to 200 mL q4 hours once IVF off    Add 60 ml before and after each cycle       Daily electrolyte check, Phos add on    Daily weights    OK to start at goal      Implementation  Nutrition education: No education needs assessed at this time  EN Composition, EN Schedule and Feeding Tube Flush: as above      Nutrition Goals  EN at goal to provide % est needs.       MONITORING AND EVALUATION:  Progress towards goals will be monitored and evaluated per protocol and Practice Guidelines    Marisel Fernández RD, LD  Pager: [phone]

## 2020-08-11 NOTE — UTILIZATION REVIEW
Admission Status; Secondary Review Determination       Under the authority of the Utilization Management Committee, the utilization review process indicated a secondary review on the above patient. The review outcome is based on review of the medical records, discussions with staff, and applying clinical experience noted on the date of the review.     (x) Inpatient Status Appropriate - This patient's medical care is consistent with medical management for inpatient care and reasonable inpatient medical practice.     RATIONALE FOR DETERMINATION     59 year old male who presents with  fever, suspected aspiration pneumonia, hyponatremia. Patient with aphasia due to history of traumatic brain injury. Patient with complicated medical history and several medical comorbidities. Patient with fever of 103.4 prompting evaluation in the emergency department and subsequent admission. Patient with history of recurrent aspiration and sepsis. Sodium level is 124 on admission. Lactic acid is 1.8. White blood cell count is elevated at 15.1.   This is a conditional review for a Medicare patient, at the time of this review patient is anticipated to require at least 1 more night of hospital care; however if plan changes and discharges before 2 midnights please send for post discharge review.  This document was produced using voice recognition software       The information on this document is developed by the utilization review team in order for the business office to ensure compliance. This only denotes the appropriateness of proper admission status and does not reflect the quality of care rendered.   The definitions of Inpatient Status and Observation Status used in making the determination above are those provided in the CMS Coverage Manual, Chapter 1 and Chapter 6, section 70.4.   Sincerely,   YESSI RON MD   System Medical Director   Utilization Management   [facility].

## 2020-08-11 NOTE — PROGRESS NOTES
Sepsis Evaluation Progress Note    I was called to see Keyon Farias due to pneumonia. He is known to have an infection.     Physical Exam   Vital Signs:  Temp: 99.3  F (37.4  C) Temp src: Axillary BP: 100/45 Pulse: 104 Heart Rate: 118 Resp: 22 SpO2: 95 % O2 Device: None (Room air)      Lab:  Lactic Acid   Date Value Ref Range Status   08/11/2020 2.8 (H) 0.7 - 2.0 mmol/L Final     Lactate for Sepsis Protocol   Date Value Ref Range Status   03/24/2020 2.2 (H) 0.7 - 2.0 mmol/L Final     Comment:     Significant value called to and read back by  MARCIA SANCHEZ ON ST [address] AT 0922 AMM         The patient is at baseline mental status.     The rest of their physical exam is significant for bilateral crackles    Assessment & Plan   Kyeon Farias meets SIRS criteria but does NOT have a lactate >2 or other evidence of acute organ damage.  These vital sign, lab and physical exam findings are consistent with SEPSIS.    Sepsis Time-Zero (time Sepsis diagnosis confirmed):1730  08/11/20    Anti-infectives (From now, onward)    Start     Dose/Rate Route Frequency Ordered Stop    08/11/20 1433  azithromycin (ZITHROMAX) 500 mg vial to attach to  mL bag      500 mg  250 mL/hr over 1 Hours Intravenous EVERY 24 HOURS 08/11/20 1432      08/11/20 0500  piperacillin-tazobactam (ZOSYN) 3.375 g vial to attach to  mL bag      3.375 g  over 30 Minutes Intravenous EVERY 6 HOURS 08/11/20 0212          Current antibiotic coverage requires additional antibiotics for vancomycin being added source.     Disposition: The patient [place] [facility] [place]  Alvaro Barahona MD    Sepsis Criteria   Sepsis: 2+ SIRS criteria due to infection  Severe Sepsis: Sepsis AND 1+ new sign of acute organ dysfunction (Note: lactate >2 is organ dysfunction)  Septic Shock: Sepsis AND hypotension despite volume resuscitation with 30 ml/kg crystalloid

## 2020-08-11 NOTE — PROGRESS NOTES
[facility]    Hospitalist Progress Note    Assessment & Plan   Keyon Farias is a 59 year old male who presents with fever. Admitted for further evaluation and treatment.      Fever, suspected aspiration pneumonia, hyponatremia:   -Patient with recent admission back in May 2020.   - Patient with chronic ongoing issues with dysphasia and recurrent aspiration.   - Patient with aphasia due to history of traumatic brain injury.  Patient with complicated medical history and several medical comorbidities.  -  Patient with fever of 103.4 prompting evaluation in the emergency department and subsequent admission.  -  Patient with history of recurrent aspiration and sepsis.  Patient sent in by his mother, with whom the patient lives.  -  Patient mother reported patient's breathing as raspy and similar to previous presentations with aspiration pneumonia.   - Patient's mother denied patient with any recent vomiting, diarrhea, change in pain, change in urine production or symptomatology.    -Patient does receive home care nursing, per report.    -History of present illness and review of systems is limited due to patient with aphasia.  Sodium level is 124 on admission.  Lactic acid is 1.8.  White blood cell count is elevated at 15.1.  Urinalysis shows 30 of glucose, pH of 8, 30 protein, few crystals.  Chest x-ray shows stable cardiomediastinal silhouette, shallow inspiration a slight elevation of right hemidiaphragm, mild right basilar atelectasis or infiltrate, see report for further details.  -Zosyn in initiated on admission .  -Blood and urine cultures pending.  -stable night  - alert, pt known to me from prior admission. Appears at baseline mental status  - lungs clear, fT site seems fine, ? Very mild purulence at site but NT and no redness. Benign abd. Neck supple  -nl sats RA.   -+SIRS    Plan ;  -Recheck pCXR this am pos fluids  -prn tylenol. Dose now  - fluids  - cont zosyn  -Close hemodynamic  monitoring.  -Pulmonary hygiene.  -COVID testing pending.  -Isolation precautions.  -Gentle IV fluids.    Addendum;   covid negative. Keep in isolation given pulmonary infultrates     Addendum; 1730 Na down to 128 with [id number]/hr since 0700 today to slow rate of correction. Lactate 2.8.   Will given 1L NS bolus now given SIRS/sepsis  Hx MRSA so will add Vancomycin for now. Cont zosyn  Change to [facility] status  covid negative but keep in isolation given infultrates and covid remains possible. Recheck at 48 hours.   BMP,lactate at 2000 then q4 hours sodiums thereafter   Goal Na is ~131 at 2100 but needs NS now for sepsis  Low Na corrected with fluids so likely prerenal. Natalia nl but post 3 L NS  Updated pt's mother. Per mother, pt had emesis on Saturday then clinically worse with course breathing fever there after. Similar occurrence last May. Nl LFTs. Pt seen again this afternoon, soft abd, alert, responsive and near baseline mental status, course bilateral BS.  Peg site looks good.   Will stress dose sterdois:  iv solucortef 100mg iv [id number] X2 then down to 50mg IV q8 hours X2. Can hold tonight's and tomorrow am po solucortef    Signed out to on call hospitalist     History of seizures: Stable.  -Continue prior to admission bivaracetam  -Continue current admission Tegretol  -pharmacy to confirm     Panhypopituitarism: Patient on levothyroxine and hydrocortisone prior to admission.  -Continue prior to admission hydrocortisone  -Continue prior to admission levothyroxine     Stress dose steroids given acute illness.       Hyponatremia  Unclear if 2/2 to missing dose of hydrocortisone  Appeared dehydrated on exam. Nl bun/cr  3L NS given in ED.   Given NS overnight   -cont fluids, decrease to 50cc/hr for now  - stat bmp, Uosm, Natalia, serum osmol, restart hydrocortisone  - follow Na closely, may need d5W to slow correction  -restart TFs     History of TBI, spastic hemiplegia, severe dysphagia: Patient with G-tube and unable to  take p.o. medications.  -appears at baseline mental status  -Nutrition consulted for tube feeds  -Continue prior to admission medications when verified by pharmacy.     GERD: Stable.  -Continue prior to admission pantoprazole when verified by pharmacy.    Pulmonary  Hold his home scheduled nebs until covid infection ruled out     DVT Prophylaxis: Pneumatic Compression Devices  Code Status: Full Code     Disposition: Inpatient.    Alvaro Barahona MD  Text Page  (7am to 6pm)  Interval History   Stable night, tachy, low grade fever  Alert, unable to provide hx    -Data reviewed today: I reviewed all new labs and imaging results over the last 24 hours. I personally reviewed labs and imaging since admission    Physical Exam   Temp: 97  F (36.1  C) Temp src: Oral BP: 103/54 Pulse: 104 Heart Rate: 101 Resp: 22 SpO2: 96 % O2 Device: None (Room air)    There were no vitals filed for this visit.  Vital Signs with Ranges  Temp:  [96.3  F (35.7  C)-102.9  F (39.4  C)] 97  F (36.1  C)  Pulse:  [104-116] 104  Heart Rate:  [101-116] 101  Resp:  [20-35] 22  BP: ()/(53-65) 103/54  SpO2:  [92 %-98 %] 96 %  No intake/output data recorded.    Constitutional: In bed, alert, nad  NecK: supple  Respiratory: Breathing easily, seems CTAB but poor effort  Cardiovascular: tachy at 100, RRR no r/g/m  GI: soft, nt, nd, feeding tube site- very slightly debri (?purulent) and redness immediately around FT site but not spread, NT  Skin/Integumen: no rash or edema  Neuro: alert, interactive nonverbally by following simple commands. Contractures - chronic. Moves extrem X 4. Appears at baseline cognitive status. Pt is nonverbal. Attends to examiner       Medications     sodium chloride 75 mL/hr at 08/11/20 0316       acetylcysteine  2 mL Nebulization 4x Daily     albuterol  2.5 mg Nebulization Q4H While awake     aspirin  81 mg Oral or Feeding Tube Daily     Brivaracetam  100 mg Oral or Feeding Tube BID     carBAMazepine  150 mg Oral or  Feeding Tube Q6H     ferrous sulfate  220 mg Per Feeding Tube Daily     fiber modular (NUTRISOURCE FIBER)  1 packet Per G Tube Daily     hydrocortisone  10 mg Oral or Feeding Tube Daily with supper     hydrocortisone  20 mg Oral or Feeding Tube QAM     levothyroxine  150 mcg Oral QAM     metoclopramide  5 mg Per Feeding Tube BID     pantoprazole  40 mg Per J Tube Daily     piperacillin-tazobactam  3.375 g Intravenous Q6H     potassium & sodium phosphates  1 packet Oral TID     sodium bicarbonate  650 mg Oral Daily     sodium chloride (PF)  3 mL Intracatheter Q8H     sodium chloride (PF)  3 mL Intracatheter Q8H     vitamin C  1,000 mg Per Feeding Tube Daily     Vitamin C Plus  1 tablet Oral or FT or NG tube Daily     vitamin D3  50 mcg Oral Daily       Data   Recent Labs   Lab 08/10/20  2153   WBC 15.1*   HGB 14.7   MCV 85      *   POTASSIUM 3.9   CHLORIDE 90*   CO2 28   BUN 14   CR 0.81   ANIONGAP 6   STEVE 8.2*   *   ALBUMIN 3.1*   PROTTOTAL 7.7   BILITOTAL 0.7   ALKPHOS 108   ALT 28   AST 16       Imaging:   Recent Results (from the past 24 hour(s))   XR Chest Port 1 View    Narrative    EXAM: XR CHEST PORT 1 VW  LOCATION: [facility]  DATE/TIME: 8/10/2020 10:00 PM    INDICATION: Fever  COMPARISON: 03/23/2020      Impression    IMPRESSION: Stable cardia mediastinal silhouette. Shallow inspiration and slight elevation right hemidiaphragm. Mild right basilar atelectasis or infiltrate.. No vascular congestion or significant effusion.

## 2020-08-11 NOTE — ED TRIAGE NOTES
EMS arrival:    Patient from home.  Fever started this morning.  Advil given.  Temp down to 100.    Hx of fever with pneumonia.  Rales, congested cough.   Temp 103.4 at home. 115.  /60. 94% room air.  Blood glucose 165.

## 2020-08-11 NOTE — PLAN OF CARE
HECTOR orientation, pt nonverbal hx TBI w/ aphasia. VSS on RA. A2 lift, total cares. NPO. G tube dressing CDI. Blanchable redness to coccyx. NS @ 75 in R PIV. COVID r/o, precautions maintained. Additionally contact precautions for MRSA and VRE. Incontinent upon arrival from ED. Continue with plan of care.

## 2020-08-11 NOTE — PROVIDER NOTIFICATION
MD Notification    Notified Person: MD    Notified Person Name: Dr Barahona    Notification Date/Time:    Notification Interaction: paged    Purpose of Notification: Na at 1.30 . COVId retest after 48-72 hours?  Thank you.    Orders Received:    Comments:
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
## 2020-08-12 ENCOUNTER — APPOINTMENT (OUTPATIENT)
Dept: GENERAL RADIOLOGY | Facility: CLINIC | Age: 60
DRG: 871 | End: 2020-08-12
Attending: INTERNAL MEDICINE
Payer: MEDICARE

## 2020-08-12 LAB
ALBUMIN SERPL-MCNC: 2.2 G/DL (ref 3.4–5)
ALP SERPL-CCNC: 77 U/L (ref 40–150)
ALT SERPL W P-5'-P-CCNC: 18 U/L (ref 0–70)
ANION GAP SERPL CALCULATED.3IONS-SCNC: 5 MMOL/L (ref 3–14)
ANION GAP SERPL CALCULATED.3IONS-SCNC: 6 MMOL/L (ref 3–14)
AST SERPL W P-5'-P-CCNC: 13 U/L (ref 0–45)
BACTERIA SPEC CULT: NO GROWTH
BILIRUB DIRECT SERPL-MCNC: 0.3 MG/DL (ref 0–0.2)
BILIRUB SERPL-MCNC: 0.5 MG/DL (ref 0.2–1.3)
BUN SERPL-MCNC: 10 MG/DL (ref 7–30)
BUN SERPL-MCNC: 9 MG/DL (ref 7–30)
CALCIUM SERPL-MCNC: 7.6 MG/DL (ref 8.5–10.1)
CALCIUM SERPL-MCNC: 8.2 MG/DL (ref 8.5–10.1)
CHLORIDE SERPL-SCNC: 108 MMOL/L (ref 94–109)
CHLORIDE SERPL-SCNC: 114 MMOL/L (ref 94–109)
CO2 SERPL-SCNC: 24 MMOL/L (ref 20–32)
CO2 SERPL-SCNC: 27 MMOL/L (ref 20–32)
CREAT SERPL-MCNC: 0.86 MG/DL (ref 0.66–1.25)
CREAT SERPL-MCNC: 0.95 MG/DL (ref 0.66–1.25)
ERYTHROCYTE [DISTWIDTH] IN BLOOD BY AUTOMATED COUNT: 13.6 % (ref 10–15)
ERYTHROCYTE [DISTWIDTH] IN BLOOD BY AUTOMATED COUNT: 13.8 % (ref 10–15)
GFR SERPL CREATININE-BSD FRML MDRD: 88 ML/MIN/{1.73_M2}
GFR SERPL CREATININE-BSD FRML MDRD: >90 ML/MIN/{1.73_M2}
GLUCOSE BLDC GLUCOMTR-MCNC: 235 MG/DL (ref 70–99)
GLUCOSE BLDC GLUCOMTR-MCNC: 245 MG/DL (ref 70–99)
GLUCOSE SERPL-MCNC: 207 MG/DL (ref 70–99)
GLUCOSE SERPL-MCNC: 287 MG/DL (ref 70–99)
HBA1C MFR BLD: 5.9 % (ref 0–5.6)
HCT VFR BLD AUTO: 32.8 % (ref 40–53)
HCT VFR BLD AUTO: 35.1 % (ref 40–53)
HGB BLD-MCNC: 10.9 G/DL (ref 13.3–17.7)
HGB BLD-MCNC: 11.9 G/DL (ref 13.3–17.7)
LACTATE BLD-SCNC: 1.3 MMOL/L (ref 0.7–2)
LACTATE BLD-SCNC: 1.8 MMOL/L (ref 0.7–2)
LACTATE BLD-SCNC: 2.6 MMOL/L (ref 0.7–2)
LACTATE BLD-SCNC: 2.9 MMOL/L (ref 0.7–2)
LIPASE SERPL-CCNC: 247 U/L (ref 73–393)
Lab: NORMAL
MAGNESIUM SERPL-MCNC: 1.9 MG/DL (ref 1.6–2.3)
MCH RBC QN AUTO: 29.5 PG (ref 26.5–33)
MCH RBC QN AUTO: 29.8 PG (ref 26.5–33)
MCHC RBC AUTO-ENTMCNC: 33.2 G/DL (ref 31.5–36.5)
MCHC RBC AUTO-ENTMCNC: 33.9 G/DL (ref 31.5–36.5)
MCV RBC AUTO: 88 FL (ref 78–100)
MCV RBC AUTO: 89 FL (ref 78–100)
PHOSPHATE SERPL-MCNC: 0.9 MG/DL (ref 2.5–4.5)
PLATELET # BLD AUTO: 138 10E9/L (ref 150–450)
PLATELET # BLD AUTO: 151 10E9/L (ref 150–450)
POTASSIUM SERPL-SCNC: 3.5 MMOL/L (ref 3.4–5.3)
POTASSIUM SERPL-SCNC: 3.9 MMOL/L (ref 3.4–5.3)
PROT SERPL-MCNC: 6.1 G/DL (ref 6.8–8.8)
RBC # BLD AUTO: 3.69 10E12/L (ref 4.4–5.9)
RBC # BLD AUTO: 4 10E12/L (ref 4.4–5.9)
SODIUM SERPL-SCNC: 132 MMOL/L (ref 133–144)
SODIUM SERPL-SCNC: 133 MMOL/L (ref 133–144)
SODIUM SERPL-SCNC: 138 MMOL/L (ref 133–144)
SODIUM SERPL-SCNC: 144 MMOL/L (ref 133–144)
SODIUM SERPL-SCNC: 146 MMOL/L (ref 133–144)
SPECIMEN SOURCE: NORMAL
VANCOMYCIN SERPL-MCNC: 18.7 MG/L
WBC # BLD AUTO: 22.5 10E9/L (ref 4–11)
WBC # BLD AUTO: 22.6 10E9/L (ref 4–11)

## 2020-08-12 PROCEDURE — 25000132 ZZH RX MED GY IP 250 OP 250 PS 637: Mod: GY | Performed by: HOSPITALIST

## 2020-08-12 PROCEDURE — 99233 SBSQ HOSP IP/OBS HIGH 50: CPT | Performed by: INTERNAL MEDICINE

## 2020-08-12 PROCEDURE — 25000128 H RX IP 250 OP 636: Performed by: INTERNAL MEDICINE

## 2020-08-12 PROCEDURE — 25000128 H RX IP 250 OP 636: Performed by: HOSPITALIST

## 2020-08-12 PROCEDURE — 85027 COMPLETE CBC AUTOMATED: CPT | Performed by: INTERNAL MEDICINE

## 2020-08-12 PROCEDURE — 27210429 ZZH NUTRITION PRODUCT INTERMEDIATE LITER

## 2020-08-12 PROCEDURE — 83735 ASSAY OF MAGNESIUM: CPT | Performed by: HOSPITALIST

## 2020-08-12 PROCEDURE — 25800030 ZZH RX IP 258 OP 636: Performed by: INTERNAL MEDICINE

## 2020-08-12 PROCEDURE — 83690 ASSAY OF LIPASE: CPT | Performed by: HOSPITALIST

## 2020-08-12 PROCEDURE — 83036 HEMOGLOBIN GLYCOSYLATED A1C: CPT | Performed by: INTERNAL MEDICINE

## 2020-08-12 PROCEDURE — 80202 ASSAY OF VANCOMYCIN: CPT | Performed by: HOSPITALIST

## 2020-08-12 PROCEDURE — 74019 RADEX ABDOMEN 2 VIEWS: CPT

## 2020-08-12 PROCEDURE — 25000125 ZZHC RX 250: Performed by: INTERNAL MEDICINE

## 2020-08-12 PROCEDURE — 36415 COLL VENOUS BLD VENIPUNCTURE: CPT | Performed by: INTERNAL MEDICINE

## 2020-08-12 PROCEDURE — 25800030 ZZH RX IP 258 OP 636: Performed by: HOSPITALIST

## 2020-08-12 PROCEDURE — 83605 ASSAY OF LACTIC ACID: CPT | Performed by: INTERNAL MEDICINE

## 2020-08-12 PROCEDURE — 80076 HEPATIC FUNCTION PANEL: CPT | Performed by: HOSPITALIST

## 2020-08-12 PROCEDURE — 36415 COLL VENOUS BLD VENIPUNCTURE: CPT | Performed by: HOSPITALIST

## 2020-08-12 PROCEDURE — 00000146 ZZHCL STATISTIC GLUCOSE BY METER IP

## 2020-08-12 PROCEDURE — 85027 COMPLETE CBC AUTOMATED: CPT | Performed by: HOSPITALIST

## 2020-08-12 PROCEDURE — 40000556 ZZH STATISTIC PERIPHERAL IV START W US GUIDANCE

## 2020-08-12 PROCEDURE — 25000131 ZZH RX MED GY IP 250 OP 636 PS 637: Mod: GY | Performed by: INTERNAL MEDICINE

## 2020-08-12 PROCEDURE — 80048 BASIC METABOLIC PNL TOTAL CA: CPT | Performed by: HOSPITALIST

## 2020-08-12 PROCEDURE — 80048 BASIC METABOLIC PNL TOTAL CA: CPT | Performed by: INTERNAL MEDICINE

## 2020-08-12 PROCEDURE — 12000011 ZZH R&B MS OVERFLOW

## 2020-08-12 PROCEDURE — 25000125 ZZHC RX 250: Performed by: HOSPITALIST

## 2020-08-12 PROCEDURE — 84100 ASSAY OF PHOSPHORUS: CPT | Performed by: HOSPITALIST

## 2020-08-12 PROCEDURE — 84295 ASSAY OF SERUM SODIUM: CPT | Performed by: INTERNAL MEDICINE

## 2020-08-12 PROCEDURE — 25000132 ZZH RX MED GY IP 250 OP 250 PS 637: Mod: GY | Performed by: INTERNAL MEDICINE

## 2020-08-12 PROCEDURE — 40000257 ZZH STATISTIC CONSULT NO CHARGE VASC ACCESS

## 2020-08-12 RX ORDER — POTASSIUM CHLORIDE 1.5 G/1.58G
20-40 POWDER, FOR SOLUTION ORAL
Status: DISCONTINUED | OUTPATIENT
Start: 2020-08-12 | End: 2020-08-12

## 2020-08-12 RX ORDER — POTASSIUM CHLORIDE 1500 MG/1
20-40 TABLET, EXTENDED RELEASE ORAL
Status: DISCONTINUED | OUTPATIENT
Start: 2020-08-12 | End: 2020-08-12

## 2020-08-12 RX ORDER — DEXTROSE MONOHYDRATE 25 G/50ML
25-50 INJECTION, SOLUTION INTRAVENOUS
Status: DISCONTINUED | OUTPATIENT
Start: 2020-08-12 | End: 2020-08-15 | Stop reason: HOSPADM

## 2020-08-12 RX ORDER — POTASSIUM CHLORIDE 7.45 MG/ML
10 INJECTION INTRAVENOUS
Status: DISCONTINUED | OUTPATIENT
Start: 2020-08-12 | End: 2020-08-12

## 2020-08-12 RX ORDER — POTASSIUM CL/LIDO/0.9 % NACL 10MEQ/0.1L
10 INTRAVENOUS SOLUTION, PIGGYBACK (ML) INTRAVENOUS
Status: DISCONTINUED | OUTPATIENT
Start: 2020-08-12 | End: 2020-08-12

## 2020-08-12 RX ORDER — ACETYLCYSTEINE 200 MG/ML
2 SOLUTION ORAL; RESPIRATORY (INHALATION) 4 TIMES DAILY
Status: DISCONTINUED | OUTPATIENT
Start: 2020-08-12 | End: 2020-08-12

## 2020-08-12 RX ORDER — ALBUTEROL SULFATE 0.83 MG/ML
2.5 SOLUTION RESPIRATORY (INHALATION)
Status: DISCONTINUED | OUTPATIENT
Start: 2020-08-12 | End: 2020-08-12

## 2020-08-12 RX ORDER — POTASSIUM CHLORIDE 29.8 MG/ML
20 INJECTION INTRAVENOUS
Status: DISCONTINUED | OUTPATIENT
Start: 2020-08-12 | End: 2020-08-12

## 2020-08-12 RX ORDER — NICOTINE POLACRILEX 4 MG
15-30 LOZENGE BUCCAL
Status: DISCONTINUED | OUTPATIENT
Start: 2020-08-12 | End: 2020-08-15 | Stop reason: HOSPADM

## 2020-08-12 RX ORDER — DEXTROSE MONOHYDRATE 50 MG/ML
INJECTION, SOLUTION INTRAVENOUS CONTINUOUS
Status: DISCONTINUED | OUTPATIENT
Start: 2020-08-12 | End: 2020-08-13

## 2020-08-12 RX ORDER — MAGNESIUM SULFATE HEPTAHYDRATE 40 MG/ML
4 INJECTION, SOLUTION INTRAVENOUS EVERY 4 HOURS PRN
Status: DISCONTINUED | OUTPATIENT
Start: 2020-08-12 | End: 2020-08-12

## 2020-08-12 RX ADMIN — Medication 0.8 MG: at 15:05

## 2020-08-12 RX ADMIN — BRIVARACETAM 100 MG: 10 SOLUTION ORAL at 20:35

## 2020-08-12 RX ADMIN — HYDROCORTISONE 10 MG: 10 TABLET ORAL at 15:04

## 2020-08-12 RX ADMIN — HYDROCORTISONE SODIUM SUCCINATE 100 MG: 100 INJECTION, POWDER, FOR SOLUTION INTRAMUSCULAR; INTRAVENOUS at 04:27

## 2020-08-12 RX ADMIN — METOPROLOL TARTRATE 2.5 MG: 5 INJECTION INTRAVENOUS at 20:31

## 2020-08-12 RX ADMIN — PIPERACILLIN SODIUM AND TAZOBACTAM SODIUM 3.38 G: 3; .375 INJECTION, POWDER, LYOPHILIZED, FOR SOLUTION INTRAVENOUS at 04:28

## 2020-08-12 RX ADMIN — INSULIN ASPART 2 UNITS: 100 INJECTION, SOLUTION INTRAVENOUS; SUBCUTANEOUS at 19:22

## 2020-08-12 RX ADMIN — PANTOPRAZOLE SODIUM 40 MG: 40 TABLET, DELAYED RELEASE ORAL at 15:04

## 2020-08-12 RX ADMIN — BRIVARACETAM 100 MG: 10 SOLUTION ORAL at 09:28

## 2020-08-12 RX ADMIN — SODIUM CHLORIDE 500 ML: 9 INJECTION, SOLUTION INTRAVENOUS at 08:34

## 2020-08-12 RX ADMIN — POTASSIUM & SODIUM PHOSPHATES POWDER PACK 280-160-250 MG 1 PACKET: 280-160-250 PACK at 15:04

## 2020-08-12 RX ADMIN — CARBAMAZEPINE 150 MG: 100 SUSPENSION ORAL at 16:25

## 2020-08-12 RX ADMIN — INSULIN ASPART 1 UNITS: 100 INJECTION, SOLUTION INTRAVENOUS; SUBCUTANEOUS at 23:12

## 2020-08-12 RX ADMIN — METOCLOPRAMIDE 5 MG: 5 SOLUTION ORAL at 20:34

## 2020-08-12 RX ADMIN — ASPIRIN 81 MG 81 MG: 81 TABLET ORAL at 08:37

## 2020-08-12 RX ADMIN — POTASSIUM & SODIUM PHOSPHATES POWDER PACK 280-160-250 MG 1 PACKET: 280-160-250 PACK at 08:37

## 2020-08-12 RX ADMIN — HYDROCORTISONE SODIUM SUCCINATE 50 MG: 100 INJECTION, POWDER, FOR SOLUTION INTRAMUSCULAR; INTRAVENOUS at 11:07

## 2020-08-12 RX ADMIN — SODIUM BICARBONATE 650 MG TABLET 650 MG: at 08:37

## 2020-08-12 RX ADMIN — PIPERACILLIN SODIUM AND TAZOBACTAM SODIUM 3.38 G: 3; .375 INJECTION, POWDER, LYOPHILIZED, FOR SOLUTION INTRAVENOUS at 18:02

## 2020-08-12 RX ADMIN — HYDROCORTISONE 20 MG: 20 TABLET ORAL at 08:39

## 2020-08-12 RX ADMIN — METOCLOPRAMIDE 5 MG: 5 SOLUTION ORAL at 08:37

## 2020-08-12 RX ADMIN — CARBAMAZEPINE 150 MG: 100 SUSPENSION ORAL at 08:36

## 2020-08-12 RX ADMIN — Medication 1 PACKET: at 09:04

## 2020-08-12 RX ADMIN — ACETAMINOPHEN 650 MG: 325 TABLET, FILM COATED ORAL at 23:05

## 2020-08-12 RX ADMIN — Medication 1 PACKET: at 09:34

## 2020-08-12 RX ADMIN — DEXTROSE MONOHYDRATE: 50 INJECTION, SOLUTION INTRAVENOUS at 17:33

## 2020-08-12 RX ADMIN — POTASSIUM PHOSPHATE, MONOBASIC AND POTASSIUM PHOSPHATE, DIBASIC 25 MMOL: 224; 236 INJECTION, SOLUTION, CONCENTRATE INTRAVENOUS at 09:38

## 2020-08-12 RX ADMIN — VANCOMYCIN HYDROCHLORIDE 1500 MG: 5 INJECTION, POWDER, LYOPHILIZED, FOR SOLUTION INTRAVENOUS at 05:40

## 2020-08-12 RX ADMIN — Medication 50 MCG: at 08:37

## 2020-08-12 RX ADMIN — LEVOTHYROXINE SODIUM 150 MCG: 150 TABLET ORAL at 05:39

## 2020-08-12 RX ADMIN — HYDROCORTISONE SODIUM SUCCINATE 25 MG: 100 INJECTION, POWDER, FOR SOLUTION INTRAMUSCULAR; INTRAVENOUS at 19:14

## 2020-08-12 RX ADMIN — AZITHROMYCIN MONOHYDRATE 500 MG: 500 INJECTION, POWDER, LYOPHILIZED, FOR SOLUTION INTRAVENOUS at 16:24

## 2020-08-12 RX ADMIN — Medication 0.8 MG: at 20:51

## 2020-08-12 RX ADMIN — PIPERACILLIN SODIUM AND TAZOBACTAM SODIUM 3.38 G: 3; .375 INJECTION, POWDER, LYOPHILIZED, FOR SOLUTION INTRAVENOUS at 23:06

## 2020-08-12 RX ADMIN — CARBAMAZEPINE 150 MG: 100 SUSPENSION ORAL at 21:26

## 2020-08-12 RX ADMIN — Medication 0.8 MG: at 08:39

## 2020-08-12 RX ADMIN — OXYCODONE HYDROCHLORIDE AND ACETAMINOPHEN 1000 MG: 500 TABLET ORAL at 08:39

## 2020-08-12 RX ADMIN — POTASSIUM & SODIUM PHOSPHATES POWDER PACK 280-160-250 MG 1 PACKET: 280-160-250 PACK at 20:51

## 2020-08-12 RX ADMIN — PIPERACILLIN SODIUM AND TAZOBACTAM SODIUM 3.38 G: 3; .375 INJECTION, POWDER, LYOPHILIZED, FOR SOLUTION INTRAVENOUS at 11:06

## 2020-08-12 RX ADMIN — MINERAL SUPPLEMENT IRON 300 MG / 5 ML STRENGTH LIQUID 100 PER BOX UNFLAVORED 220 MG: at 08:36

## 2020-08-12 RX ADMIN — CARBAMAZEPINE 150 MG: 100 SUSPENSION ORAL at 04:27

## 2020-08-12 ASSESSMENT — ACTIVITIES OF DAILY LIVING (ADL)
ADLS_ACUITY_SCORE: 43
ADLS_ACUITY_SCORE: 41
ADLS_ACUITY_SCORE: 43
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
## 2020-08-12 NOTE — PROVIDER NOTIFICATION
Asked by Dr. Barahona to follow up BMP and lactate timed for 8pm. Per nursing lab has been unable to draw. Advised team to call flying squad for assistance. Please call on call Hospitalist for review when completed.     Bindu Barillas PA-C

## 2020-08-12 NOTE — PROGRESS NOTES
Vital signs stable on RA, hypotensive; t max 102.7, afebrile now. A&Ox 4, nonverbal at baseline. Up with A2, lift. Tele sinus tach. CMS intact. Lung sounds diminished. Frequent productive cough, unable to cough; oral care done, refused suctioning. NPO, tube feed. Incontinent B/B. Adequate urinary output. Bowel sounds active. Resting comfortably, no signs of pain. Skin wdl, G tube. PIV inf d5NS at 75, int abx. Slept well between cares. COVID negative, re-swab. Continue to monitor.

## 2020-08-12 NOTE — PROVIDER NOTIFICATION
MD Notification    Notified Person: MD    Notified Person Name: denise MD    Notification Date/Time: 8/11 2224    Notification Interaction: amcom page    Purpose of Notification: OBS 1-1 J.P.    pt HR now 130-140s. pressures slightly up from earlier now at 99/83. please advise.    *[id number] NANCI Enriquez    Orders Received: metrogelio PETTYN    Comments:

## 2020-08-12 NOTE — PROGRESS NOTES
[facility]    Hospitalist Progress Note    Assessment & Plan   Keyon Farias is a 59 year old male who presents with fever. Admitted for further evaluation and treatment.      Fever, suspected aspiration pneumonia, hyponatremia:   Rule out covid infection  -Patient with recent admission back in May 2020.   - Patient with chronic ongoing issues with dysphasia and recurrent aspiration.   - Patient with aphasia due to history of traumatic brain injury.  Patient with complicated medical history and several medical comorbidities.  -  Patient with fever of 103.4 prompting evaluation in the emergency department and subsequent admission.  -  Patient with history of recurrent aspiration and sepsis.  Patient sent in by his mother, with whom the patient lives.  -  Patient mother reported patient's breathing as raspy and similar to previous presentations with aspiration pneumonia.   - Patient's mother denied patient with any recent vomiting, diarrhea, change in pain, change in urine production or symptomatology.    -Patient does receive home care nursing, per report.    -History of present illness and review of systems is limited due to patient with aphasia.  Sodium level is 124 on admission.  Lactic acid is 1.8.  White blood cell count is elevated at 15.1.  Urinalysis shows 30 of glucose, pH of 8, 30 protein, few crystals.  Chest x-ray shows stable cardiomediastinal silhouette, shallow inspiration a slight elevation of right hemidiaphragm, mild right basilar atelectasis or infiltrate, see report for further details.  -Zosyn in initiated on admission .  -Blood and urine cultures ngtd  -this admission, alert, pt known to me from prior admission. Appears at baseline mental status  -fT site seems fine, Benign abd. Neck supple  -+SIRS    Today:   Given 1L NS and stress dose steroids added 8/11 pm for lactate of 2.8, persisting fever, tachy/SIRS.   -Na is normal, HR down overnight. Lactate now nl  -bmp nl. Hb now at  baseline. Wbc remains elevated but had stress dose steroids  Lungs course last pm but clear now.   - covid negative , suspect aspiration pneumonia but has multiple outside care givers so covid remains possibility. Hx of vomiting about 6 times per week per mother with hx of aspiration pneumonia hospitalizations. Last May of this year.     Plan ;  -IMC status started 8/11  -covid isolation for now, recheck 2nd test tonight at 2100. If negative then patient can be taken out of isolation                                                        -prn tylenol.  - cont zosyn for aspiration pneumonia, given hx mrsa, vancomycin added 8/11. Azithromycin to cover CAP. If cont to do well tomorrow may be able to stop vancomycin  -Close hemodynamic monitoring.  -Pulmonary hygiene.  -Isolation precautions.  - IV fluids.  -resume his pTA mucomyst nebs and albuterol nebs  - aspiration precuations  -cont reglan PTA dosing    Addendum; 1800  -Pt did well today. Good UO- very saturated absorbable diaper. Still tachy  More alert today, interactive, gave thumbs up to RN this am and this afternoon. Warm extrem  -Lungs clear. Benign abd exam  -abd XRay unremarkeable  -Concerning rise in serum Na today- likely from fluids: Had Phos (in normal saline) replacement run all day, NS bolus this am and abx in NS and stress dose steroids---> NA increase from 139 to 147 (corrected for blood sugar) this afternoon.     Plan;  -Goal serum Na by tomorrow am is 140  -increase free water flushes from 200cc down FT q4 hours to 400cc q4 hours for now- can reduce to baseline tomorrow. May take a day to be effective.  -decrease stress dose steroids from 50mg q8 to 25mg q8 (not hypotensive) and will hold his oral Cortef as steroids may be contributing to Na rise. Can probably go back to his PTA Cortef BID in am if doing ok.  -Start D5W at 100cc/hr and follow serum Na q4 hours (will need blood sugar check with Na for accuracy)- titrate up/down for goal  - add ISS  to cover  - stop azithromycin and Vancomycin as strongly suspect aspiration pneumonia  -WBC remains elevated but has been on stress dose steroids. Clinically seems better and fever curve down.   -repeat covid pcr tonight.   -cont IMC status  -repeat lactate at 2000.   [facility]      Recurrent vomiting  -per mother occurs about 6 times per week, chronically  With resultant aspiration pneumonias in past and on this admission  On reglan  CT abd 2019 X 2 shows no source  Has FT  Benign abd exam this stay  Passing stools hear, no vomiting in  Hospital. Tolerating TFS  - [facility] consult as to etiology and if additional measures/imaging indicated. Likely need repeat CT abd. Consider SBFT.  - abd xR 2V now while in isolation but clinically not appear obstructioned  -aspiration precautions     History of seizures: Stable.no sz activity  -Continue prior to admission bivaracetam  -Continue current admission Tegretol  -pharmacy to confirm     Panhypopituitarism: Patient on levothyroxine and hydrocortisone prior to admission.  -Continue prior to admission hydrocortisone  -Continue prior to admission levothyroxine     Stress dose steroids given acute illness.   Initiated on hydrocortison 100mg iv q8 hours overnight X 2 then 50mg IV q hours for now and likely wean off tomorrow to baseline solucortef  - continue baseline solucortef dosing      Hyponatremia- resolved. 2/2 dehydration  Unclear if 2/2 to missing dose of hydrocortisone  Appeared dehydrated on exam. Nl bun/cr  3L NS given in ED.   Given NS maint on admission  - given D5W 8/11 to slow rate of correction  - given elevated lactate, fever, tachy,so pt needed NS  - suspect 2/2 dehydration  -Na gradually increased to 138 on D5NS  -cont TFs with free water flushes. RN to confirm with nutrition free water flushes  - D5NS at 100cc/hr for now  -daily bmp     History of TBI, spastic hemiplegia, severe dysphagia: Patient with G-tube and unable to take p.o.  medications.  -appears at baseline mental status  -Nutrition consulted for tube feeds, RN to confirm with nutrition that free water flushes correct  -Continue prior to admission medications when verified by pharmacy.     GERD: Stable.  -Continue prior to admission pantoprazole when verified by pharmacy.    Pulmonary  Hold his home scheduled nebs until covid infection ruled out     Hx Pancreatic cyst - noted on CT abd 5/2019- 3.9 cm cyst in the tail of the pancreas, increased in size since 5/16/2018.   No comment on this on CT abd 9/2019.   Will consult [facility], should probably have CT abd when out of isolation to follow up pancreatic cyst and recurrent vomiting.     DVT Prophylaxis: Pneumatic Compression Devices  Code Status: Full Code     Disposition: Inpatient.  imc status,     Lives with mothers, has home care and daily HHA    Updated pt's mother/guardian    Alvaro Barahona MD  Text Page  (7am to 6pm)  Interval History   HR down, lactate down  Off oxygen  Unable to provide hx given his cognitive impairment  Gave a thumbs up to the RN this am      -Data reviewed today: I reviewed all new labs and imaging results over the last 24 hours. I personally reviewed labs and imaging since admission    Physical Exam   Temp: 98.8  F (37.1  C) Temp src: Oral BP: 112/68   Heart Rate: 115 Resp: 17 SpO2: 96 % O2 Device: None (Room air) Oxygen Delivery: 2 LPM  Vitals:    08/11/20 0740   Weight: 75.3 kg (166 lb)     Vital Signs with Ranges  Temp:  [98.5  F (36.9  C)-102.7  F (39.3  C)] 98.8  F (37.1  C)  Heart Rate:  [] 115  Resp:  [17-40] 17  BP: ()/(45-83) 112/68  SpO2:  [90 %-98 %] 96 %  No intake/output data recorded.    Constitutional: In bed, alert, nad  NecK: supple  Respiratory: Breathing easily, lungs sound clearer today. Slight course BS post. No upper airway sounds. No cyanosis. No cough.   Cardiovascular: tachy currently to 110 with exam, RRR no r/g/m  GI: soft, nt, nd, feeding tube site looks fine.    Skin/Integumen: no rash or edema  Neuro: awake, scratches nose with L hand. Not typically move R arm. Not following commands for me at movement, appears comfortable. Resists opening eyes to check pupils, pupils reactive, R pupil a little larger than left Contractures - chronic. Pt is nonverbal.        Medications     dextrose 5% and 0.9% NaCl 100 mL/hr at 08/11/20 2218       acetylcysteine  2 mL Nebulization 4x Daily     albuterol  2.5 mg Nebulization Q4H While awake     aspirin  81 mg Oral or Feeding Tube Daily     azithromycin  500 mg Intravenous Q24H     Brivaracetam  100 mg Oral or Feeding Tube BID     carBAMazepine  150 mg Oral or Feeding Tube Q6H     Emergen-C Vitamin C  1 packet Oral or Feeding Tube Daily     ferrous sulfate  220 mg Per Feeding Tube Daily     fiber modular (NUTRISOURCE FIBER)  1 packet Per G Tube Daily     hydrocortisone  10 mg Oral or Feeding Tube Daily with supper     hydrocortisone  20 mg Oral or Feeding Tube QAM     hydrocortisone sodium succinate PF  50 mg Intravenous Q8H     levothyroxine  150 mcg Oral or Feeding Tube QAM     metoclopramide  5 mg Per Feeding Tube BID     pantoprazole  40 mg Per J Tube Daily     piperacillin-tazobactam  3.375 g Intravenous Q6H     potassium & sodium phosphates  1 packet Oral TID     Scopolamine HBr  0.8 mg NG or Feeding tube TID     sodium bicarbonate  650 mg Oral or Feeding Tube Daily     sodium chloride (PF)  3 mL Intracatheter Q8H     sodium chloride (PF)  3 mL Intracatheter Q8H     sodium chloride (PF)  3 mL Intracatheter Q8H     vancomycin (VANCOCIN) IV  1,500 mg Intravenous Q12H     vitamin C  1,000 mg Oral or Feeding Tube Daily     vitamin D3  50 mcg Oral or Feeding Tube Daily       Data   Recent Labs   Lab 08/12/20  0635 08/12/20  0229 08/12/20  0012 08/11/20  2150  08/11/20  0817 08/10/20  2153   WBC 22.6*  --   --   --   --  15.8* 15.1*   HGB 11.9*  --   --   --   --  13.6 14.7   MCV 88  --   --   --   --  87 85   *  --   --   --   --   166 175    132* 133 133   < > 131* 124*   POTASSIUM 3.9  --   --  4.7  --  3.9 3.9   CHLORIDE 108  --   --  106  --  99 90*   CO2 24  --   --  24  --  27 28   BUN 9  --   --  10  --  11 14   CR 0.95  --   --  0.82  --  0.86 0.81   ANIONGAP 6  --   --  3  --  5 6   STEVE 7.6*  --   --  7.8*  --  7.9* 8.2*   *  --   --  167*  --  80 107*   ALBUMIN  --   --   --   --   --   --  3.1*   PROTTOTAL  --   --   --   --   --   --  7.7   BILITOTAL  --   --   --   --   --   --  0.7   ALKPHOS  --   --   --   --   --   --  108   ALT  --   --   --   --   --   --  28   AST  --   --   --   --   --   --  16    < > = values in this interval not displayed.       Imaging:   No results found for this or any previous visit (from the past 24 hour(s)).

## 2020-08-12 NOTE — PROGRESS NOTES
Patient alert, non verbal at baseline, vss, a-febrile, appears comfortable, cms intact , right hemiplegia, blanchable erythema , barrier cream applied, patient has intermitted productive cough, NPO, on tube feeding @ 100 ML from 8am-8pm, 200 ml water flush Q 4 hrs, incontinent  Of B&B, turn and reposition Q 2 hrs, pt on abx for possible aspiration Pneumonia, Covid (-) but will be re-swabbed at 2100 tonight, tele SR.

## 2020-08-12 NOTE — PROVIDER NOTIFICATION
MD Notification    Notified Person: MD    Notified Person Name: denise MD    Notification Date/Time: 8/12 0051    Notification Interaction: amcom page    Purpose of Notification:     OBS 12-1 ISIDORO JENSEN:  Lactic 1.3  Na 133    *[id number] NANCI Enriquez    Orders Received:    Comments:

## 2020-08-13 ENCOUNTER — APPOINTMENT (OUTPATIENT)
Dept: CT IMAGING | Facility: CLINIC | Age: 60
DRG: 871 | End: 2020-08-13
Attending: INTERNAL MEDICINE
Payer: MEDICARE

## 2020-08-13 LAB
ANION GAP SERPL CALCULATED.3IONS-SCNC: 4 MMOL/L (ref 3–14)
BUN SERPL-MCNC: 9 MG/DL (ref 7–30)
CA-I BLD-MCNC: 4.2 MG/DL (ref 4.4–5.2)
CALCIUM SERPL-MCNC: 8.2 MG/DL (ref 8.5–10.1)
CHLORIDE SERPL-SCNC: 110 MMOL/L (ref 94–109)
CO2 SERPL-SCNC: 28 MMOL/L (ref 20–32)
CREAT SERPL-MCNC: 0.8 MG/DL (ref 0.66–1.25)
ERYTHROCYTE [DISTWIDTH] IN BLOOD BY AUTOMATED COUNT: 14 % (ref 10–15)
GFR SERPL CREATININE-BSD FRML MDRD: >90 ML/MIN/{1.73_M2}
GLUCOSE BLDC GLUCOMTR-MCNC: 125 MG/DL (ref 70–99)
GLUCOSE BLDC GLUCOMTR-MCNC: 130 MG/DL (ref 70–99)
GLUCOSE BLDC GLUCOMTR-MCNC: 170 MG/DL (ref 70–99)
GLUCOSE BLDC GLUCOMTR-MCNC: 184 MG/DL (ref 70–99)
GLUCOSE BLDC GLUCOMTR-MCNC: 232 MG/DL (ref 70–99)
GLUCOSE SERPL-MCNC: 131 MG/DL (ref 70–99)
HCT VFR BLD AUTO: 32.7 % (ref 40–53)
HGB BLD-MCNC: 10.9 G/DL (ref 13.3–17.7)
LABORATORY COMMENT REPORT: NORMAL
LACTATE BLD-SCNC: 1.9 MMOL/L (ref 0.7–2)
MAGNESIUM SERPL-MCNC: 2.2 MG/DL (ref 1.6–2.3)
MCH RBC QN AUTO: 29.5 PG (ref 26.5–33)
MCHC RBC AUTO-ENTMCNC: 33.3 G/DL (ref 31.5–36.5)
MCV RBC AUTO: 89 FL (ref 78–100)
PHOSPHATE SERPL-MCNC: 2.4 MG/DL (ref 2.5–4.5)
PLATELET # BLD AUTO: 154 10E9/L (ref 150–450)
POTASSIUM SERPL-SCNC: 3.4 MMOL/L (ref 3.4–5.3)
RBC # BLD AUTO: 3.69 10E12/L (ref 4.4–5.9)
SARS-COV-2 RNA SPEC QL NAA+PROBE: NEGATIVE
SARS-COV-2 RNA SPEC QL NAA+PROBE: NORMAL
SODIUM SERPL-SCNC: 137 MMOL/L (ref 133–144)
SODIUM SERPL-SCNC: 139 MMOL/L (ref 133–144)
SODIUM SERPL-SCNC: 142 MMOL/L (ref 133–144)
SPECIMEN SOURCE: NORMAL
SPECIMEN SOURCE: NORMAL
WBC # BLD AUTO: 15.2 10E9/L (ref 4–11)

## 2020-08-13 PROCEDURE — 36415 COLL VENOUS BLD VENIPUNCTURE: CPT | Performed by: INTERNAL MEDICINE

## 2020-08-13 PROCEDURE — 36415 COLL VENOUS BLD VENIPUNCTURE: CPT | Performed by: HOSPITALIST

## 2020-08-13 PROCEDURE — 25000132 ZZH RX MED GY IP 250 OP 250 PS 637: Mod: GY | Performed by: INTERNAL MEDICINE

## 2020-08-13 PROCEDURE — 25000125 ZZHC RX 250: Performed by: INTERNAL MEDICINE

## 2020-08-13 PROCEDURE — 99233 SBSQ HOSP IP/OBS HIGH 50: CPT | Performed by: INTERNAL MEDICINE

## 2020-08-13 PROCEDURE — 85027 COMPLETE CBC AUTOMATED: CPT | Performed by: HOSPITALIST

## 2020-08-13 PROCEDURE — 74177 CT ABD & PELVIS W/CONTRAST: CPT

## 2020-08-13 PROCEDURE — 25000125 ZZHC RX 250: Performed by: HOSPITALIST

## 2020-08-13 PROCEDURE — 25800030 ZZH RX IP 258 OP 636: Performed by: INTERNAL MEDICINE

## 2020-08-13 PROCEDURE — 12000011 ZZH R&B MS OVERFLOW

## 2020-08-13 PROCEDURE — 00000146 ZZHCL STATISTIC GLUCOSE BY METER IP

## 2020-08-13 PROCEDURE — 25000128 H RX IP 250 OP 636: Performed by: INTERNAL MEDICINE

## 2020-08-13 PROCEDURE — 82330 ASSAY OF CALCIUM: CPT | Performed by: INTERNAL MEDICINE

## 2020-08-13 PROCEDURE — 25000132 ZZH RX MED GY IP 250 OP 250 PS 637: Mod: GY | Performed by: HOSPITALIST

## 2020-08-13 PROCEDURE — 83735 ASSAY OF MAGNESIUM: CPT | Performed by: INTERNAL MEDICINE

## 2020-08-13 PROCEDURE — 83605 ASSAY OF LACTIC ACID: CPT | Performed by: INTERNAL MEDICINE

## 2020-08-13 PROCEDURE — 25000128 H RX IP 250 OP 636: Performed by: HOSPITALIST

## 2020-08-13 PROCEDURE — 84100 ASSAY OF PHOSPHORUS: CPT | Performed by: INTERNAL MEDICINE

## 2020-08-13 PROCEDURE — 80048 BASIC METABOLIC PNL TOTAL CA: CPT | Performed by: INTERNAL MEDICINE

## 2020-08-13 PROCEDURE — 84295 ASSAY OF SERUM SODIUM: CPT | Performed by: INTERNAL MEDICINE

## 2020-08-13 RX ORDER — AMOXICILLIN 250 MG
1 CAPSULE ORAL 2 TIMES DAILY PRN
Status: DISCONTINUED | OUTPATIENT
Start: 2020-08-13 | End: 2020-08-15 | Stop reason: HOSPADM

## 2020-08-13 RX ORDER — POLYETHYLENE GLYCOL 3350 17 G/17G
17 POWDER, FOR SOLUTION ORAL DAILY PRN
Status: DISCONTINUED | OUTPATIENT
Start: 2020-08-13 | End: 2020-08-15 | Stop reason: HOSPADM

## 2020-08-13 RX ORDER — HYDROCORTISONE 20 MG/1
20 TABLET ORAL DAILY
Status: DISCONTINUED | OUTPATIENT
Start: 2020-08-13 | End: 2020-08-15 | Stop reason: HOSPADM

## 2020-08-13 RX ORDER — IOPAMIDOL 755 MG/ML
82 INJECTION, SOLUTION INTRAVASCULAR ONCE
Status: COMPLETED | OUTPATIENT
Start: 2020-08-13 | End: 2020-08-13

## 2020-08-13 RX ORDER — AMOXICILLIN 250 MG
2 CAPSULE ORAL 2 TIMES DAILY PRN
Status: DISCONTINUED | OUTPATIENT
Start: 2020-08-13 | End: 2020-08-15 | Stop reason: HOSPADM

## 2020-08-13 RX ORDER — ACETAMINOPHEN 325 MG/1
650 TABLET ORAL EVERY 4 HOURS PRN
Status: DISCONTINUED | OUTPATIENT
Start: 2020-08-13 | End: 2020-08-15 | Stop reason: HOSPADM

## 2020-08-13 RX ADMIN — Medication 50 MCG: at 08:59

## 2020-08-13 RX ADMIN — MINERAL SUPPLEMENT IRON 300 MG / 5 ML STRENGTH LIQUID 100 PER BOX UNFLAVORED 220 MG: at 09:00

## 2020-08-13 RX ADMIN — BRIVARACETAM 100 MG: 10 SOLUTION ORAL at 20:32

## 2020-08-13 RX ADMIN — METOCLOPRAMIDE 5 MG: 5 SOLUTION ORAL at 20:31

## 2020-08-13 RX ADMIN — HYDROCORTISONE 20 MG: 20 TABLET ORAL at 15:02

## 2020-08-13 RX ADMIN — CARBAMAZEPINE 150 MG: 100 SUSPENSION ORAL at 20:33

## 2020-08-13 RX ADMIN — INSULIN ASPART 1 UNITS: 100 INJECTION, SOLUTION INTRAVENOUS; SUBCUTANEOUS at 20:57

## 2020-08-13 RX ADMIN — Medication 1 PACKET: at 09:02

## 2020-08-13 RX ADMIN — PIPERACILLIN SODIUM AND TAZOBACTAM SODIUM 3.38 G: 3; .375 INJECTION, POWDER, LYOPHILIZED, FOR SOLUTION INTRAVENOUS at 18:20

## 2020-08-13 RX ADMIN — ASPIRIN 81 MG 81 MG: 81 TABLET ORAL at 08:59

## 2020-08-13 RX ADMIN — PIPERACILLIN SODIUM AND TAZOBACTAM SODIUM 3.38 G: 3; .375 INJECTION, POWDER, LYOPHILIZED, FOR SOLUTION INTRAVENOUS at 11:19

## 2020-08-13 RX ADMIN — Medication 0.8 MG: at 15:05

## 2020-08-13 RX ADMIN — POTASSIUM & SODIUM PHOSPHATES POWDER PACK 280-160-250 MG 1 PACKET: 280-160-250 PACK at 15:01

## 2020-08-13 RX ADMIN — IOPAMIDOL 82 ML: 755 INJECTION, SOLUTION INTRAVENOUS at 17:49

## 2020-08-13 RX ADMIN — Medication 0.8 MG: at 20:33

## 2020-08-13 RX ADMIN — HYDROCORTISONE SODIUM SUCCINATE 25 MG: 100 INJECTION, POWDER, FOR SOLUTION INTRAMUSCULAR; INTRAVENOUS at 11:18

## 2020-08-13 RX ADMIN — BRIVARACETAM 100 MG: 10 SOLUTION ORAL at 09:01

## 2020-08-13 RX ADMIN — PANTOPRAZOLE SODIUM 40 MG: 40 TABLET, DELAYED RELEASE ORAL at 09:01

## 2020-08-13 RX ADMIN — CARBAMAZEPINE 150 MG: 100 SUSPENSION ORAL at 18:21

## 2020-08-13 RX ADMIN — OXYCODONE HYDROCHLORIDE AND ACETAMINOPHEN 1000 MG: 500 TABLET ORAL at 08:59

## 2020-08-13 RX ADMIN — HYDROCORTISONE SODIUM SUCCINATE 25 MG: 100 INJECTION, POWDER, FOR SOLUTION INTRAMUSCULAR; INTRAVENOUS at 03:34

## 2020-08-13 RX ADMIN — PIPERACILLIN SODIUM AND TAZOBACTAM SODIUM 3.38 G: 3; .375 INJECTION, POWDER, LYOPHILIZED, FOR SOLUTION INTRAVENOUS at 22:37

## 2020-08-13 RX ADMIN — PIPERACILLIN SODIUM AND TAZOBACTAM SODIUM 3.38 G: 3; .375 INJECTION, POWDER, LYOPHILIZED, FOR SOLUTION INTRAVENOUS at 05:48

## 2020-08-13 RX ADMIN — POTASSIUM PHOSPHATE, MONOBASIC AND POTASSIUM PHOSPHATE, DIBASIC 15 MMOL: 224; 236 INJECTION, SOLUTION, CONCENTRATE INTRAVENOUS at 12:09

## 2020-08-13 RX ADMIN — CARBAMAZEPINE 150 MG: 100 SUSPENSION ORAL at 10:25

## 2020-08-13 RX ADMIN — INSULIN ASPART 1 UNITS: 100 INJECTION, SOLUTION INTRAVENOUS; SUBCUTANEOUS at 12:09

## 2020-08-13 RX ADMIN — POTASSIUM & SODIUM PHOSPHATES POWDER PACK 280-160-250 MG 1 PACKET: 280-160-250 PACK at 20:33

## 2020-08-13 RX ADMIN — METOCLOPRAMIDE 5 MG: 5 SOLUTION ORAL at 09:00

## 2020-08-13 RX ADMIN — Medication 0.8 MG: at 09:02

## 2020-08-13 RX ADMIN — LEVOTHYROXINE SODIUM 150 MCG: 150 TABLET ORAL at 05:48

## 2020-08-13 RX ADMIN — INSULIN ASPART 1 UNITS: 100 INJECTION, SOLUTION INTRAVENOUS; SUBCUTANEOUS at 16:21

## 2020-08-13 RX ADMIN — SODIUM CHLORIDE 72 ML: 9 INJECTION, SOLUTION INTRAVENOUS at 17:49

## 2020-08-13 RX ADMIN — ONDANSETRON 4 MG: 2 INJECTION INTRAMUSCULAR; INTRAVENOUS at 20:42

## 2020-08-13 RX ADMIN — SODIUM BICARBONATE 650 MG TABLET 650 MG: at 08:59

## 2020-08-13 RX ADMIN — CARBAMAZEPINE 150 MG: 100 SUSPENSION ORAL at 03:34

## 2020-08-13 ASSESSMENT — ACTIVITIES OF DAILY LIVING (ADL)
ADLS_ACUITY_SCORE: 41

## 2020-08-13 NOTE — PLAN OF CARE
Alert, unable to assess orientation. Inconsistent with following commands. VSS except tachy HR 110s. On RA sat in the mid 90s. HR improved to less than 100 with prn metoprolol. BP stable. Tolerated H20 flushes. Gave prn tylenol for discomfort with no further non verbal indicators of pain. Incontinent, assisted with two person to turned/repositioned q 2hrs with oral cares. MD aware yesterday's sodium/lactic lab result. Water flushes on hold per Dr. Villavicencio-see MD's note. Awaits this am labs & covid-19 result.

## 2020-08-13 NOTE — PROGRESS NOTES
[facility]    Hospitalist Progress Note    Assessment & Plan   Keyon Farias is a 59 year old male who presents with fever. Admitted for further evaluation and treatment.      Fever, suspected aspiration pneumonia, hyponatremia:   Rule out covid infection  -Patient with recent admission back in May 2020. Patient with chronic ongoing issues with dysphasia and recurrent aspiration. Patient with aphasia due to history of traumatic brain injury.  Patient with complicated medical history and several medical comorbidities.  -  Patient with fever of 103.4 prompting evaluation in the emergency department and subsequent admission.  Patient had an episode of vomiting prior to this admission, patient lives at home with home care, mother was able to keep him home for 3 months prior to this event.  -Zosyn started for aspiration pneumonia, patient was febrile with tachycardia, currently afebrile, heart rate improved his lactic acid levels are stabilized.  Blood cultures are negative to date, COVID negative x2  -Chest x-ray shows possible atelectasis, encourage incentive spirometry use  -Patient is resting in room air, discussed with mother today with about possibility of discharge home tomorrow 8/14.  -Patient is also on vancomycin which was started on 8/11 and azithromycin.  Will stop vancomycin today.  Continue isolation precautions, nebulizations and aspiration precautions.      Hyponatremia  ---Currently corrected, will reduce free water intake to 200 mg with each flush.  --Continue his steroid supplementation for adrenal insufficiency.    Multiple electrolyte abnormalities including hypophosphatemia  Replace      Recurrent vomiting  -per mother occurs about 6 times per week, chronically  With resultant aspiration pneumonias in past and on this admission  On reglan  -GI was consulted, they recommended getting CT abdomen and pelvis, since cold is ruled out will order CT abdomen and pelvis.  -X-ray abdomen  noted, no bowel obstruction seen     History of seizures: Stable.no sz activity  -Continue his PTA seizure medication including Tegretol and Bevracetam     Panhypopituitarism: Patient on levothyroxine and hydrocortisone prior to admission.  -Continue prior to admission hydrocortisone  -Continue prior to admission levothyroxine     Stopped stress dose steroid and restarted his PTA dosage.      Hyponatremia- resolved. 2/2 dehydration  Unclear if 2/2 to missing dose of hydrocortisone  Resolved, he did receive IV hydration during the stay here.     History of TBI, spastic hemiplegia, severe dysphagia: Patient with G-tube and unable to take p.o. medications.  -appears at baseline mental status  -Nutrition consulted for tube feeds, continue his home tube feeds     GERD: Stable.  -Continue prior to admission pantoprazole when verified by pharmacy.       Hx Pancreatic cyst - noted on CT abd 5/2019- 3.9 cm cyst in the tail of the pancreas, increased in size since 5/16/2018.   No comment on this on CT abd 9/2019.   CT ordered to rule out further about the pancreatic cyst    DVT Prophylaxis: Pneumatic Compression Devices  Code Status: Full Code     Disposition:  Plan for possible discharge tomorrow depending on the CT results as well as his electrolytes and heart rate.  Updated patient's guardian/mother on the phone, total time spent more than 35 minutes, time spent mostly for above.    Melisa Ash MD  Text Page  (7am to 6pm)  Interval History   Patient is doing much better today, he is resting in room air, his blood pressure is stable, he still continues to be mildly tachycardic, his heart rate is between 100-102, he denies any new concerns.      -Data reviewed today: I reviewed all new labs and imaging results over the last 24 hours. I personally reviewed labs and imaging since admission    Physical Exam   Temp: 98.3  F (36.8  C) Temp src: Axillary BP: 107/60   Heart Rate: 78 Resp: 18 SpO2: 99 % O2 Device: None (Room  air)    Vitals:    08/11/20 0740   Weight: 75.3 kg (166 lb)     Vital Signs with Ranges  Temp:  [98.2  F (36.8  C)-98.6  F (37  C)] 98.3  F (36.8  C)  Heart Rate:  [] 78  Resp:  [17-23] 18  BP: ()/(48-85) 107/60  SpO2:  [94 %-99 %] 99 %  I/O last 3 completed shifts:  In: 2000 [I.V.:1200; NG/GT:800]  Out: -     Constitutional: Awake, alert  Respiratory: Bilateral basilar crackles noted  Cardiovascular: Regular rate and rhythm, normal S1 and S2, and no murmur noted  GI: Abdomen is soft, G-tube site noted  Skin/Integumen: No rashes, no cyanosis, trace lower extremity edema noted  Neuro : Bilateral upper and lower extremity contractures noted, patient is awake but minimally verbal ,cognitive deficits are present         Medications       aspirin  81 mg Oral or Feeding Tube Daily     Brivaracetam  100 mg Oral or Feeding Tube BID     carBAMazepine  150 mg Oral or Feeding Tube Q6H     Emergen-C Vitamin C  1 packet Oral or Feeding Tube Daily     ferrous sulfate  220 mg Per Feeding Tube Daily     fiber modular (NUTRISOURCE FIBER)  1 packet Per G Tube Daily     hydrocortisone sodium succinate PF  25 mg Intravenous Q8H     insulin aspart  1-4 Units Subcutaneous Q4H     levothyroxine  150 mcg Oral or Feeding Tube QAM     metoclopramide  5 mg Per Feeding Tube BID     pantoprazole  40 mg Per J Tube Daily     piperacillin-tazobactam  3.375 g Intravenous Q6H     potassium & sodium phosphates  1 packet Oral or Feeding Tube TID     Scopolamine HBr  0.8 mg NG or Feeding tube TID     sodium bicarbonate  650 mg Oral or Feeding Tube Daily     sodium chloride (PF)  3 mL Intracatheter Q8H     sodium chloride (PF)  3 mL Intracatheter Q8H     sodium chloride (PF)  3 mL Intracatheter Q8H     vitamin C  1,000 mg Oral or Feeding Tube Daily     vitamin D3  50 mcg Oral or Feeding Tube Daily       Data   Recent Labs   Lab 08/12/20  2024 08/12/20  1613 08/12/20  0635  08/11/20  2150  08/11/20  0817 08/10/20  2153   WBC  --  22.5* 22.6*   --   --   --  15.8* 15.1*   HGB  --  10.9* 11.9*  --   --   --  13.6 14.7   MCV  --  89 88  --   --   --  87 85   PLT  --  151 138*  --   --   --  166 175    146* 138   < > 133   < > 131* 124*   POTASSIUM  --  3.5 3.9  --  4.7  --  3.9 3.9   CHLORIDE  --  114* 108  --  106  --  99 90*   CO2  --  27 24  --  24  --  27 28   BUN  --  10 9  --  10  --  11 14   CR  --  0.86 0.95  --  0.82  --  0.86 0.81   ANIONGAP  --  5 6  --  3  --  5 6   STEVE  --  8.2* 7.6*  --  7.8*  --  7.9* 8.2*   GLC  --  287* 207*  --  167*  --  80 107*   ALBUMIN  --   --  2.2*  --   --   --   --  3.1*   PROTTOTAL  --   --  6.1*  --   --   --   --  7.7   BILITOTAL  --   --  0.5  --   --   --   --  0.7   ALKPHOS  --   --  77  --   --   --   --  108   ALT  --   --  18  --   --   --   --  28   AST  --   --  13  --   --   --   --  16   LIPASE  --   --  247  --   --   --   --   --     < > = values in this interval not displayed.       Imaging:   Recent Results (from the past 24 hour(s))   XR Abdomen Port 2 Views    Narrative    ABDOMEN PORTABLE TWO VIEWS   8/12/2020 11:40 AM     HISTORY: Rule out obstructive bowel gas pattern. Presents with emesis.    COMPARISON: Intraoperative gastric tube placement evaluation dated  6/24/2020, abdominal x-rays dated 1/29/2020.     FINDINGS: Percutaneous gastrojejunostomy tube is noted and appears  projected in the appropriate position. There is a nonspecific bowel  gas pattern with gas seen both in large and small bowel. No abnormally  dilated gas-filled loops of bowel to suggest bowel obstruction.  Radiopaque stool is seen in the distal colon which could represent  prior oral contrast administration. This could also represent  ingestion of bismuth containing material such as Pepto-Bismol. No  definite renal or ureteral calculus is identified. No free air is seen  around the liver edge on the left lateral decubitus image.      Impression    IMPRESSION:  1. Mildly nonspecific bowel gas pattern without evidence  for bowel  obstruction.  2. Percutaneous gastrojejunostomy tube appears in appropriate  position.  3. No evidence for urinary system calculus or free intraperitoneal  air.    SISI ALY MD

## 2020-08-13 NOTE — PROGRESS NOTES
Patient is alert, vss, low grade temp 99.3, patient is coughing, non productive cough, CT of abdomen/pelvis shows RT lung base infiltrate possibly pneumonia, tele SR/ST, sodium back to normal 139 , incontinent B&B, turn and repo Q 2 hrs, tolerating tube feeding at 100 ML/hr, patient is covid (-).

## 2020-08-14 LAB
ANION GAP SERPL CALCULATED.3IONS-SCNC: 5 MMOL/L (ref 3–14)
BUN SERPL-MCNC: 12 MG/DL (ref 7–30)
CA-I SERPL ISE-MCNC: 4.1 MG/DL (ref 4.4–5.2)
CALCIUM SERPL-MCNC: 7.3 MG/DL (ref 8.5–10.1)
CHLORIDE SERPL-SCNC: 104 MMOL/L (ref 94–109)
CO2 SERPL-SCNC: 28 MMOL/L (ref 20–32)
CREAT SERPL-MCNC: 0.87 MG/DL (ref 0.66–1.25)
ERYTHROCYTE [DISTWIDTH] IN BLOOD BY AUTOMATED COUNT: 14.2 % (ref 10–15)
GFR SERPL CREATININE-BSD FRML MDRD: >90 ML/MIN/{1.73_M2}
GLUCOSE BLDC GLUCOMTR-MCNC: 115 MG/DL (ref 70–99)
GLUCOSE BLDC GLUCOMTR-MCNC: 121 MG/DL (ref 70–99)
GLUCOSE BLDC GLUCOMTR-MCNC: 63 MG/DL (ref 70–99)
GLUCOSE BLDC GLUCOMTR-MCNC: 80 MG/DL (ref 70–99)
GLUCOSE BLDC GLUCOMTR-MCNC: 94 MG/DL (ref 70–99)
GLUCOSE SERPL-MCNC: 85 MG/DL (ref 70–99)
HCT VFR BLD AUTO: 28.5 % (ref 40–53)
HGB BLD-MCNC: 9.5 G/DL (ref 13.3–17.7)
LACTATE BLD-SCNC: 0.9 MMOL/L (ref 0.7–2)
LIPASE SERPL-CCNC: 235 U/L (ref 73–393)
MCH RBC QN AUTO: 29.4 PG (ref 26.5–33)
MCHC RBC AUTO-ENTMCNC: 33.3 G/DL (ref 31.5–36.5)
MCV RBC AUTO: 88 FL (ref 78–100)
PHOSPHATE SERPL-MCNC: 2.8 MG/DL (ref 2.5–4.5)
PLATELET # BLD AUTO: 131 10E9/L (ref 150–450)
POTASSIUM SERPL-SCNC: 3.5 MMOL/L (ref 3.4–5.3)
RBC # BLD AUTO: 3.23 10E12/L (ref 4.4–5.9)
SODIUM SERPL-SCNC: 137 MMOL/L (ref 133–144)
WBC # BLD AUTO: 9.4 10E9/L (ref 4–11)

## 2020-08-14 PROCEDURE — 36415 COLL VENOUS BLD VENIPUNCTURE: CPT | Performed by: INTERNAL MEDICINE

## 2020-08-14 PROCEDURE — 82330 ASSAY OF CALCIUM: CPT | Performed by: INTERNAL MEDICINE

## 2020-08-14 PROCEDURE — 85027 COMPLETE CBC AUTOMATED: CPT | Performed by: INTERNAL MEDICINE

## 2020-08-14 PROCEDURE — 25800025 ZZH RX 258: Performed by: INTERNAL MEDICINE

## 2020-08-14 PROCEDURE — 25000132 ZZH RX MED GY IP 250 OP 250 PS 637: Mod: GY | Performed by: HOSPITALIST

## 2020-08-14 PROCEDURE — 83605 ASSAY OF LACTIC ACID: CPT | Performed by: INTERNAL MEDICINE

## 2020-08-14 PROCEDURE — 99233 SBSQ HOSP IP/OBS HIGH 50: CPT | Performed by: INTERNAL MEDICINE

## 2020-08-14 PROCEDURE — 84100 ASSAY OF PHOSPHORUS: CPT | Performed by: INTERNAL MEDICINE

## 2020-08-14 PROCEDURE — 25000128 H RX IP 250 OP 636: Performed by: HOSPITALIST

## 2020-08-14 PROCEDURE — 83690 ASSAY OF LIPASE: CPT | Performed by: INTERNAL MEDICINE

## 2020-08-14 PROCEDURE — 25000132 ZZH RX MED GY IP 250 OP 250 PS 637: Mod: GY | Performed by: INTERNAL MEDICINE

## 2020-08-14 PROCEDURE — 25000128 H RX IP 250 OP 636: Performed by: INTERNAL MEDICINE

## 2020-08-14 PROCEDURE — 00000146 ZZHCL STATISTIC GLUCOSE BY METER IP

## 2020-08-14 PROCEDURE — 80048 BASIC METABOLIC PNL TOTAL CA: CPT | Performed by: INTERNAL MEDICINE

## 2020-08-14 PROCEDURE — 12000000 ZZH R&B MED SURG/OB

## 2020-08-14 RX ORDER — HYDROCORTISONE 10 MG/1
10 TABLET ORAL
Status: DISCONTINUED | OUTPATIENT
Start: 2020-08-14 | End: 2020-08-15 | Stop reason: HOSPADM

## 2020-08-14 RX ORDER — FUROSEMIDE 10 MG/ML
40 INJECTION INTRAMUSCULAR; INTRAVENOUS ONCE
Status: COMPLETED | OUTPATIENT
Start: 2020-08-14 | End: 2020-08-14

## 2020-08-14 RX ADMIN — CARBAMAZEPINE 150 MG: 100 SUSPENSION ORAL at 10:22

## 2020-08-14 RX ADMIN — METOCLOPRAMIDE 5 MG: 5 SOLUTION ORAL at 08:52

## 2020-08-14 RX ADMIN — POTASSIUM & SODIUM PHOSPHATES POWDER PACK 280-160-250 MG 1 PACKET: 280-160-250 PACK at 21:10

## 2020-08-14 RX ADMIN — Medication 1 PACKET: at 08:53

## 2020-08-14 RX ADMIN — Medication 0.8 MG: at 08:54

## 2020-08-14 RX ADMIN — PIPERACILLIN SODIUM AND TAZOBACTAM SODIUM 3.38 G: 3; .375 INJECTION, POWDER, LYOPHILIZED, FOR SOLUTION INTRAVENOUS at 17:18

## 2020-08-14 RX ADMIN — PANTOPRAZOLE SODIUM 40 MG: 40 TABLET, DELAYED RELEASE ORAL at 08:54

## 2020-08-14 RX ADMIN — HYDROCORTISONE 10 MG: 10 TABLET ORAL at 20:35

## 2020-08-14 RX ADMIN — Medication 0.8 MG: at 14:06

## 2020-08-14 RX ADMIN — Medication 0.8 MG: at 20:35

## 2020-08-14 RX ADMIN — PIPERACILLIN SODIUM AND TAZOBACTAM SODIUM 3.38 G: 3; .375 INJECTION, POWDER, LYOPHILIZED, FOR SOLUTION INTRAVENOUS at 04:54

## 2020-08-14 RX ADMIN — Medication 1 PACKET: at 08:55

## 2020-08-14 RX ADMIN — OXYCODONE HYDROCHLORIDE AND ACETAMINOPHEN 1000 MG: 500 TABLET ORAL at 08:53

## 2020-08-14 RX ADMIN — Medication 50 MCG: at 08:52

## 2020-08-14 RX ADMIN — SODIUM BICARBONATE 650 MG TABLET 650 MG: at 08:52

## 2020-08-14 RX ADMIN — CARBAMAZEPINE 150 MG: 100 SUSPENSION ORAL at 22:15

## 2020-08-14 RX ADMIN — METOCLOPRAMIDE 5 MG: 5 SOLUTION ORAL at 20:35

## 2020-08-14 RX ADMIN — DEXTROSE MONOHYDRATE 25 ML: 25 INJECTION, SOLUTION INTRAVENOUS at 00:24

## 2020-08-14 RX ADMIN — ACETAMINOPHEN 650 MG: 325 TABLET, FILM COATED ORAL at 10:22

## 2020-08-14 RX ADMIN — POTASSIUM & SODIUM PHOSPHATES POWDER PACK 280-160-250 MG 1 PACKET: 280-160-250 PACK at 14:06

## 2020-08-14 RX ADMIN — BRIVARACETAM 100 MG: 10 SOLUTION ORAL at 08:51

## 2020-08-14 RX ADMIN — LEVOTHYROXINE SODIUM 150 MCG: 150 TABLET ORAL at 05:33

## 2020-08-14 RX ADMIN — PIPERACILLIN SODIUM AND TAZOBACTAM SODIUM 3.38 G: 3; .375 INJECTION, POWDER, LYOPHILIZED, FOR SOLUTION INTRAVENOUS at 11:08

## 2020-08-14 RX ADMIN — BRIVARACETAM 100 MG: 10 SOLUTION ORAL at 22:15

## 2020-08-14 RX ADMIN — ASPIRIN 81 MG 81 MG: 81 TABLET ORAL at 08:53

## 2020-08-14 RX ADMIN — POLYETHYLENE GLYCOL 3350 17 G: 17 POWDER, FOR SOLUTION ORAL at 08:51

## 2020-08-14 RX ADMIN — ONDANSETRON 4 MG: 2 INJECTION INTRAMUSCULAR; INTRAVENOUS at 05:33

## 2020-08-14 RX ADMIN — FUROSEMIDE 40 MG: 10 INJECTION, SOLUTION INTRAVENOUS at 13:08

## 2020-08-14 RX ADMIN — CARBAMAZEPINE 150 MG: 100 SUSPENSION ORAL at 04:54

## 2020-08-14 RX ADMIN — PIPERACILLIN SODIUM AND TAZOBACTAM SODIUM 3.38 G: 3; .375 INJECTION, POWDER, LYOPHILIZED, FOR SOLUTION INTRAVENOUS at 22:15

## 2020-08-14 RX ADMIN — HYDROCORTISONE 20 MG: 20 TABLET ORAL at 08:52

## 2020-08-14 RX ADMIN — MINERAL SUPPLEMENT IRON 300 MG / 5 ML STRENGTH LIQUID 100 PER BOX UNFLAVORED 220 MG: at 08:55

## 2020-08-14 RX ADMIN — POTASSIUM & SODIUM PHOSPHATES POWDER PACK 280-160-250 MG 1 PACKET: 280-160-250 PACK at 08:53

## 2020-08-14 RX ADMIN — CARBAMAZEPINE 150 MG: 100 SUSPENSION ORAL at 16:13

## 2020-08-14 ASSESSMENT — ACTIVITIES OF DAILY LIVING (ADL)
ADLS_ACUITY_SCORE: 39
ADLS_ACUITY_SCORE: 41
ADLS_ACUITY_SCORE: 41
ADLS_ACUITY_SCORE: 43
ADLS_ACUITY_SCORE: 39
ADLS_ACUITY_SCORE: 43

## 2020-08-14 NOTE — PROGRESS NOTES
D: SW following for discharge planning, per protocol. Possible discharge, now canceled. Pt now transferring to medical unit.   I: Pt lives at home with his mother/guardian, Savannah. Patient currently receives the following services:  Home Care RN through [facility] (P [phone] Fax [phone]).  He also has PCA service through [facility] and TF supplies through [facility]. Pt has had multiple hospitalizations this year. Briefly spoke with mother. Pt will discharge home, when ready. If discharge Saturday, Savannah reports pt's PCA will be home with her. He will need a ride arranged through [employer] transport. There is a ramp on the house. [facility] notified of pt not discharging today. They request discharge summary faxed upon discharge.   P: SW following for discharge planning.     GILMER Santamaria, [facility]  [phone]  [facility]

## 2020-08-14 NOTE — PROVIDER NOTIFICATION
MD Dr. Epperson notified of glucose level 63. Pt remained alert/appeared asymptomatic. Gave D50 25mL x 1. Glucose improved to 115. Will recheck again at 0200. MD discontinued q4hr sliding scale insulin.

## 2020-08-14 NOTE — PLAN OF CARE
Patient is alert, HECTOR orientation, pt severely aphasic. VSS on RA. LS coarse thorughout, Infrequent cough. Voiding, pt incontinent.  Up with lift, turn/repo q2h. TF stopped per orders, flushed per orders. Plan on continuing to monitor respiratory status, discharge pending.

## 2020-08-14 NOTE — PROGRESS NOTES
"CLINICAL NUTRITION SERVICES - REASSESSMENT NOTE      Malnutrition: (8/11)  % Weight Loss:  None noted  % Intake:  Decreased intake does not meet criteria for malnutrition   Subcutaneous Fat Loss:  Unable to assess. Suspect baseline  Muscle Loss:  Unable to assess. Suspect baseline   Fluid Retention:  None noted     Malnutrition Diagnosis: Patient does not meet two of the above criteria necessary for diagnosing malnutrition       EVALUATION OF PROGRESS TOWARD GOALS   Diet:  NPO    Nutrition Support:  Continues on cyclic TF as follows ~    Nutrition Support Enteral:  Type of Feeding Tube: J-tube  Enteral Frequency:  Cyclic  Enteral Regimen: Isosource 1.5 at 100 mL/hr x 12 hours (8am - 8pm)  Total Enteral Provisions: 1800 kcal, 82 g protein (1.1 g/kg and 91% needs), 211 g CHO, 18 g fiber, 912 mL H2O  Free Water Flush: 200 mL every 4 hours + 60 mL before and after each cycle   Also receiving NutriSource Fiber 1 pkt daily = 3 g fiber, 15 kcal  Total = 1815 kcal (24 kcal/kg)      Intake/Tolerance:    K and Phos normal (has been receiving replacement of this)  Na 137 (NL) - Corrected with D5 and flushes  BGM  range - Noted sliding scale insulin discontinued   No stool yet - Getting Senokot and Miralax       ASSESSED NUTRITION NEEDS:  Dosing Weight 75.3 kg   Estimated Energy Needs: 0250-4877 kcals (20-25 Kcal/Kg)  Justification: maintenance  Estimated Protein Needs:  grams protein (1.2-1.5 g pro/Kg)  Justification: maintenance and repletion      NEW FINDINGS:   8/12 GI --> \"c/w J tube feeding and open up the GI tube with either intermittent suction or gravity to empty up gastric content\"   8/13:  Abdominal CT= 1.  Artifact through the upper abdomen in the region of the pancreas   as described. Correlate with patient's pancreatic enzymes to exclude   the possibility of pancreatitis. Pancreatic tail cyst is noted and   unchanged.   2.  Gastrostomy tube is in good position within the gastric lumen. No   bowel " obstruction.   3.  Posterior right lung base infiltrate concerning for pneumonia.     Previous Goals (8/11):   EN at goal to provide % est needs.   Evaluation: Met    Previous Nutrition Diagnosis (8/11):   Inadequate enteral nutrition infusion related to hospitalizatoin as evidenced by EN on hold, RD consulted to order per home regimen  Evaluation: Resolved       CURRENT NUTRITION DIAGNOSIS  No nutrition diagnosis identified at this time    INTERVENTIONS  Recommendations / Nutrition Prescription  Continue nocturnal TF regimen as above     Implementation  None     Goals  Nocturnal TF + NuriSource Fiber will meet % needs     MONITORING AND EVALUATION:  Progress towards goals will be monitored and evaluated per protocol and Practice Guidelines    Swati Parrish RD, LD, CNSC   Clinical Dietitian - [facility]

## 2020-08-14 NOTE — PROGRESS NOTES
Patient is alert but non verbal at baseline, garbled speech, hard to understand, follow commands, vss, febrile 99 auxillary tylenol given, tele SR, patient is NPO on G-tube feeding at 100 ML 8a-8pm with 200 ml water flush Q 4 hrs, blanchable redness on coccyx, mepilex in place, incontinent of bowel and bladder, turn and reposition Q 2 hrs, patient is total care, on IV Zosyn Q 6 hrs for pneumonia, plan to discharge to home to [place].

## 2020-08-14 NOTE — PROGRESS NOTES
[facility]    Hospitalist Progress Note    Assessment & Plan   Keyon Farias is a 59 year old male who presents with fever. Admitted for further evaluation and treatment.      Fever, suspected aspiration pneumonia, hyponatremia:   Rule out covid infection  -Patient with recent admission back in May 2020. Patient with chronic ongoing issues with dysphasia and recurrent aspiration. Patient with aphasia due to history of traumatic brain injury.  Patient with complicated medical history and several medical comorbidities.  -  Patient with fever of 103.4 prompting evaluation in the emergency department and subsequent admission.  Patient had an episode of vomiting prior to this admission, patient lives at home with home care, mother was able to keep him home for 3 months prior to this event.  -Zosyn started for aspiration pneumonia, patient was febrile with tachycardia, currently afebrile, heart rate improved his lactic acid levels are stabilized.  Blood cultures are negative to date, COVID negative x2  -Chest x-ray shows possible atelectasis, encourage incentive spirometry use, CT of the abdomen did document possible infiltrate on the right side  -Patient is resting in room air, continues to have ongoing tachycardia, he did receive a lot of fluid during his stay here, will try a dose of IV Lasix here.  If heart rate remains stable possibly can discharge home on 8/15  -Patient is also on vancomycin which was started on 8/11 and azithromycin.  Currently patient is not on any antibiotics other than Zosyn.  Can transition to Augmentin at the time of discharge.  Continue isolation precautions, nebulizations and aspiration precautions.      Hyponatremia  ---Currently corrected, will reduce free water intake to 200 mg with each flush.  --Continue his steroid supplementation for adrenal insufficiency.    Multiple electrolyte abnormalities including hypophosphatemia  Replace      Recurrent vomiting  -per mother  occurs about 6 times per week, chronically  With resultant aspiration pneumonias in past and on this admission  On reglan  -GI was consulted, they recommended getting CT abdomen and pelvis, CT abdomen and pelvis does not indicate any reason for ongoing nausea and vomiting.  Pancreatic cyst is remaining stable.     History of seizures: Stable.no sz activity  -Continue his PTA seizure medication including Tegretol and Bevracetam     Panhypopituitarism: Patient on levothyroxine and hydrocortisone prior to admission.  -Continue prior to admission hydrocortisone  -Continue prior to admission levothyroxine     Stopped stress dose steroid and restarted his PTA dosage.      Hyponatremia- resolved. 2/2 dehydration  Unclear if 2/2 to missing dose of hydrocortisone  Resolved, he did receive IV hydration during the stay here.     History of TBI, spastic hemiplegia, severe dysphagia: Patient with G-tube and unable to take p.o. medications.  -appears at baseline mental status  -Nutrition consulted for tube feeds, continue his home tube feeds     GERD: Stable.  -Continue prior to admission pantoprazole .       Hx Pancreatic cyst - noted on CT abd 5/2019- 3.9 cm cyst in the tail of the pancreas, increased in size since 5/16/2018.   No comment on this on CT abd 9/2019.   CT abdomen and pelvis ordered on 8/13, this is just still present but no significant change in size noted, pancreatic enzymes are repeated today which is within normal limit.    DVT Prophylaxis: Pneumatic Compression Devices  Code Status: Full Code     Disposition:  Plan for possible discharge tomorrow if heart rate remains stable, discussed with the nursing, will notify mother.   Melisa Ash MD  Text Page  (7am to 6pm)  Interval History   Patient is awake and trying to communicate, he denies any new concerns, is heart rate is ranging from , sinus tachycardia noted, he is resting in room air, the plan was earlier to discharge him home today, due to ongoing  tachycardia discharge is on hold.  Tachycardia is ongoing since this admission.    -Data reviewed today: I reviewed all new labs and imaging results over the last 24 hours. I personally reviewed labs and imaging since admission    Physical Exam   Temp: 99  F (37.2  C) Temp src: Axillary BP: 107/66   Heart Rate: 80 Resp: 20 SpO2: 92 % O2 Device: None (Room air)    Vitals:    08/11/20 0740 08/14/20 0700   Weight: 75.3 kg (166 lb) 74.8 kg (165 lb)     Vital Signs with Ranges  Temp:  [98.6  F (37  C)-99.3  F (37.4  C)] 99  F (37.2  C)  Heart Rate:  [] 80  Resp:  [20-29] 20  BP: ()/(56-81) 107/66  SpO2:  [92 %-99 %] 92 %  I/O last 3 completed shifts:  In: 1415 [I.V.:100; NG/GT:1115]  Out: -     Constitutional: Awake, alert  Respiratory: Bilateral basilar crackles noted  Cardiovascular: Regular rate and rhythm, normal S1 and S2, and no murmur noted  GI: Abdomen is soft, G-tube site noted  Skin/Integumen: No rashes, no cyanosis, trace lower extremity edema noted  Neuro : Bilateral upper and lower extremity contractures noted, patient is awake but minimally verbal ,cognitive deficits are present         Medications       aspirin  81 mg Oral or Feeding Tube Daily     Brivaracetam  100 mg Oral or Feeding Tube BID     carBAMazepine  150 mg Oral or Feeding Tube Q6H     Emergen-C Vitamin C  1 packet Oral or Feeding Tube Daily     ferrous sulfate  220 mg Per Feeding Tube Daily     fiber modular (NUTRISOURCE FIBER)  1 packet Per G Tube Daily     furosemide  40 mg Intravenous Once     hydrocortisone  20 mg Oral Daily     levothyroxine  150 mcg Oral or Feeding Tube QAM     metoclopramide  5 mg Per Feeding Tube BID     pantoprazole  40 mg Per J Tube Daily     piperacillin-tazobactam  3.375 g Intravenous Q6H     potassium & sodium phosphates  1 packet Oral or Feeding Tube TID     Scopolamine HBr  0.8 mg NG or Feeding tube TID     sodium bicarbonate  650 mg Oral or Feeding Tube Daily     sodium chloride (PF)  3 mL  Intracatheter Q8H     vitamin C  1,000 mg Oral or Feeding Tube Daily     vitamin D3  50 mcg Oral or Feeding Tube Daily       Data   Recent Labs   Lab 08/14/20  0623 08/13/20  1837 08/13/20  1404 08/13/20  0934  08/12/20  1613 08/12/20  0635  08/10/20  2153   WBC 9.4  --   --  15.2*  --  22.5* 22.6*   < > 15.1*   HGB 9.5*  --   --  10.9*  --  10.9* 11.9*   < > 14.7   MCV 88  --   --  89  --  89 88   < > 85   *  --   --  154  --  151 138*   < > 175    137 139 142   < > 146* 138   < > 124*   POTASSIUM 3.5  --   --  3.4  --  3.5 3.9   < > 3.9   CHLORIDE 104  --   --  110*  --  114* 108   < > 90*   CO2 28  --   --  28  --  27 24   < > 28   BUN 12  --   --  9  --  10 9   < > 14   CR 0.87  --   --  0.80  --  0.86 0.95   < > 0.81   ANIONGAP 5  --   --  4  --  5 6   < > 6   STEVE 7.3*  --   --  8.2*  --  8.2* 7.6*   < > 8.2*   GLC 85  --   --  131*  --  287* 207*   < > 107*   ALBUMIN  --   --   --   --   --   --  2.2*  --  3.1*   PROTTOTAL  --   --   --   --   --   --  6.1*  --  7.7   BILITOTAL  --   --   --   --   --   --  0.5  --  0.7   ALKPHOS  --   --   --   --   --   --  77  --  108   ALT  --   --   --   --   --   --  18  --  28   AST  --   --   --   --   --   --  13  --  16   LIPASE 235  --   --   --   --   --  247  --   --     < > = values in this interval not displayed.       Imaging:   Recent Results (from the past 24 hour(s))   CT Abdomen Pelvis w Contrast    Narrative    CT ABDOMEN PELVIS WITH CONTRAST 8/13/2020 6:01 PM    CLINICAL HISTORY: Abdominal distension.    TECHNIQUE: CT scan of the abdomen and pelvis was performed following  injection of IV contrast. Multiplanar reformats were obtained. Dose  reduction techniques were used.  CONTRAST: 82mL Isovue-370    COMPARISON: Abdominal x-ray 8/12/2020. CT chest abdomen pelvis  9/17/2019.    FINDINGS:   LOWER CHEST: Right lung base infiltrate is present possibly reflecting  pneumonia. Left lung base is clear.    HEPATOBILIARY: Normal.    PANCREAS: There is  a 3.2 cm cyst at the tail of the pancreas. Motion  artifact and beam hardening artifact through the region of the  pancreatic head and body is noted. Cannot exclude mild surrounding  peripancreatic inflammatory changes in the region of the pancreatic  head. Correlate with patient's pancreatic enzymes to exclude the  possibility of pancreatitis.    SPLEEN: Normal.    ADRENAL GLANDS: Normal.    KIDNEYS/BLADDER: Normal.    BOWEL: Previously administered oral contrast is present within the  colon and rectum. No evidence of bowel obstruction. No evidence of  diverticulitis. Appendix is normal. Gastrostomy tube appears in good  position within the gastric lumen. Jejunal extension tubing terminates  in the proximal jejunum.    LYMPH NODES: No enlarged abdominal or pelvic lymph nodes.    VASCULATURE: Unremarkable.    PELVIC ORGANS: The bladder, prostate and rectum are unremarkable.    ADDITIONAL FINDINGS: None.    MUSCULOSKELETAL: Normal.      Impression    IMPRESSION:   1.  Artifact through the upper abdomen in the region of the pancreas  as described. Correlate with patient's pancreatic enzymes to exclude  the possibility of pancreatitis. Pancreatic tail cyst is noted and  unchanged.    2.  Gastrostomy tube is in good position within the gastric lumen. No  bowel obstruction.    3.  Posterior right lung base infiltrate concerning for pneumonia.    GEE BOWMAN MD

## 2020-08-14 NOTE — PROGRESS NOTES
[facility]    Hospitalist Progress Note  Interval History   Doing well and no vomiting reported by nursing staff. Still getting zofran every now and then.    All other review of systems are negative.    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 GI consulted for chronic N/V.     Problem: N/V:  Currently doing well   -  Again, pt has a G-J tube with J tube for feeding and G tube can serves as venting even while J tube running, best way to do it is connect G tube to a bag to gravity at home to decompress and it wont affect J tube feeding  -  No sign of obstruction on CT      Problem: pancreatic cyst:  stable  -  Ideally will need MRCP or EUS but not sure how beneficial it is going be to given pt's co-morbidities    -  Given pancreatic cyst size of > 3 cm will recommend repeat CT in 6-12 months       [address]10 level 2 follow up      Code Status: Full Code    -Data reviewed today: I reviewed all new labs and imaging results over the last 24 hours.     Physical Exam   Temp: 98  F (36.7  C) Temp src: Oral BP: 112/67   Heart Rate: 100 Resp: 23 SpO2: 98 % O2 Device: None (Room air)    Vitals:    08/11/20 0740 08/14/20 0700   Weight: 75.3 kg (166 lb) 74.8 kg (165 lb)     Vital Signs with Ranges  Temp:  [98  F (36.7  C)-99.3  F (37.4  C)] 98  F (36.7  C)  Heart Rate:  [] 100  Resp:  [20-29] 23  BP: ()/(56-81) 112/67  SpO2:  [92 %-99 %] 98 %  I/O last 3 completed shifts:  In: 1415 [I.V.:100; NG/GT:1115]  Out: -     Constitutional: Awake, alert but barely any mental status, no apparent distress  Respiratory: Clear to auscultation bilaterally, no crackles or wheezing  Cardiovascular: Regular rate and  rhythm, normal S1 and S2, and no murmur noted  GI: Normal bowel sounds, soft, non-distended, non-tender  Skin/Integumen: No rashes, no cyanosis, no edema  Other:     Mike Balderrama MD  ([place])  [facility]  Office: [phone]  Cell: [phone], please feel free to call the cell    Medications       aspirin  81 mg Oral or Feeding Tube Daily     Brivaracetam  100 mg Oral or Feeding Tube BID     carBAMazepine  150 mg Oral or Feeding Tube Q6H     Emergen-C Vitamin C  1 packet Oral or Feeding Tube Daily     ferrous sulfate  220 mg Per Feeding Tube Daily     fiber modular (NUTRISOURCE FIBER)  1 packet Per G Tube Daily     hydrocortisone  10 mg Oral Daily with supper     hydrocortisone  20 mg Oral Daily     levothyroxine  150 mcg Oral or Feeding Tube QAM     metoclopramide  5 mg Per Feeding Tube BID     pantoprazole  40 mg Per J Tube Daily     piperacillin-tazobactam  3.375 g Intravenous Q6H     potassium & sodium phosphates  1 packet Oral or Feeding Tube TID     Scopolamine HBr  0.8 mg NG or Feeding tube TID     sodium bicarbonate  650 mg Oral or Feeding Tube Daily     sodium chloride (PF)  3 mL Intracatheter Q8H     vitamin C  1,000 mg Oral or Feeding Tube Daily     vitamin D3  50 mcg Oral or Feeding Tube Daily       Data   Recent Labs   Lab 08/14/20  0623 08/13/20  1837 08/13/20  1404 08/13/20  0934  08/12/20  1613 08/12/20  0635  08/10/20  2153   WBC 9.4  --   --  15.2*  --  22.5* 22.6*   < > 15.1*   HGB 9.5*  --   --  10.9*  --  10.9* 11.9*   < > 14.7   MCV 88  --   --  89  --  89 88   < > 85   *  --   --  154  --  151 138*   < > 175    137 139 142   < > 146* 138   < > 124*   POTASSIUM 3.5  --   --  3.4  --  3.5 3.9   < > 3.9   CHLORIDE 104  --   --  110*  --  114* 108   < > 90*   CO2 28  --   --  28  --  27 24   < > 28   BUN 12  --   --  9  --  10 9   < > 14   CR 0.87  --   --  0.80  --  0.86 0.95   < > 0.81   ANIONGAP 5  --   --  4  --  5 6   < > 6   STEVE 7.3*  --   --   8.2*  --  8.2* 7.6*   < > 8.2*   GLC 85  --   --  131*  --  287* 207*   < > 107*   ALBUMIN  --   --   --   --   --   --  2.2*  --  3.1*   PROTTOTAL  --   --   --   --   --   --  6.1*  --  7.7   BILITOTAL  --   --   --   --   --   --  0.5  --  0.7   ALKPHOS  --   --   --   --   --   --  77  --  108   ALT  --   --   --   --   --   --  18  --  28   AST  --   --   --   --   --   --  13  --  16   LIPASE 235  --   --   --   --   --  247  --   --     < > = values in this interval not displayed.       Recent Results (from the past 24 hour(s))   CT Abdomen Pelvis w Contrast    Narrative    CT ABDOMEN PELVIS WITH CONTRAST 8/13/2020 6:01 PM    CLINICAL HISTORY: Abdominal distension.    TECHNIQUE: CT scan of the abdomen and pelvis was performed following  injection of IV contrast. Multiplanar reformats were obtained. Dose  reduction techniques were used.  CONTRAST: 82mL Isovue-370    COMPARISON: Abdominal x-ray 8/12/2020. CT chest abdomen pelvis  9/17/2019.    FINDINGS:   LOWER CHEST: Right lung base infiltrate is present possibly reflecting  pneumonia. Left lung base is clear.    HEPATOBILIARY: Normal.    PANCREAS: There is a 3.2 cm cyst at the tail of the pancreas. Motion  artifact and beam hardening artifact through the region of the  pancreatic head and body is noted. Cannot exclude mild surrounding  peripancreatic inflammatory changes in the region of the pancreatic  head. Correlate with patient's pancreatic enzymes to exclude the  possibility of pancreatitis.    SPLEEN: Normal.    ADRENAL GLANDS: Normal.    KIDNEYS/BLADDER: Normal.    BOWEL: Previously administered oral contrast is present within the  colon and rectum. No evidence of bowel obstruction. No evidence of  diverticulitis. Appendix is normal. Gastrostomy tube appears in good  position within the gastric lumen. Jejunal extension tubing terminates  in the proximal jejunum.    LYMPH NODES: No enlarged abdominal or pelvic lymph nodes.    VASCULATURE:  Unremarkable.    PELVIC ORGANS: The bladder, prostate and rectum are unremarkable.    ADDITIONAL FINDINGS: None.    MUSCULOSKELETAL: Normal.      Impression    IMPRESSION:   1.  Artifact through the upper abdomen in the region of the pancreas  as described. Correlate with patient's pancreatic enzymes to exclude  the possibility of pancreatitis. Pancreatic tail cyst is noted and  unchanged.    2.  Gastrostomy tube is in good position within the gastric lumen. No  bowel obstruction.    3.  Posterior right lung base infiltrate concerning for pneumonia.    MD Mike CARL MD  ([place]  [facility]  Office: [phone]  Cell: [phone]

## 2020-08-14 NOTE — PLAN OF CARE
Pt nonverbal. Inconsistent but follows some commands. Needs reminders, cooperative with cares. VSS on RA. Tele Sinus Rhythm. Shakes head no when asked if having pain and does not show any non verbal indicators. Due to nausea PRN zofran was given x 1. G/J tube flushed q4hrs. Pt voiding adequately. Incontinent of urine. A1-2 in bed dependent on cares. Refusing oral cares. Mouth very dry. Will continue to monitor.

## 2020-08-15 VITALS
RESPIRATION RATE: 20 BRPM | BODY MASS INDEX: 22.19 KG/M2 | OXYGEN SATURATION: 97 % | HEART RATE: 104 BPM | SYSTOLIC BLOOD PRESSURE: 116 MMHG | DIASTOLIC BLOOD PRESSURE: 65 MMHG | WEIGHT: 163.58 LBS | TEMPERATURE: 98.8 F

## 2020-08-15 LAB
ANION GAP SERPL CALCULATED.3IONS-SCNC: 6 MMOL/L (ref 3–14)
BUN SERPL-MCNC: 17 MG/DL (ref 7–30)
CALCIUM SERPL-MCNC: 7.9 MG/DL (ref 8.5–10.1)
CHLORIDE SERPL-SCNC: 99 MMOL/L (ref 94–109)
CO2 SERPL-SCNC: 29 MMOL/L (ref 20–32)
CREAT SERPL-MCNC: 0.97 MG/DL (ref 0.66–1.25)
GFR SERPL CREATININE-BSD FRML MDRD: 86 ML/MIN/{1.73_M2}
GLUCOSE BLDC GLUCOMTR-MCNC: 70 MG/DL (ref 70–99)
GLUCOSE SERPL-MCNC: 88 MG/DL (ref 70–99)
POTASSIUM SERPL-SCNC: 3.6 MMOL/L (ref 3.4–5.3)
SODIUM SERPL-SCNC: 134 MMOL/L (ref 133–144)

## 2020-08-15 PROCEDURE — 00000146 ZZHCL STATISTIC GLUCOSE BY METER IP

## 2020-08-15 PROCEDURE — 25000128 H RX IP 250 OP 636: Performed by: HOSPITALIST

## 2020-08-15 PROCEDURE — 25000132 ZZH RX MED GY IP 250 OP 250 PS 637: Mod: GY | Performed by: HOSPITALIST

## 2020-08-15 PROCEDURE — 27210429 ZZH NUTRITION PRODUCT INTERMEDIATE LITER

## 2020-08-15 PROCEDURE — 36415 COLL VENOUS BLD VENIPUNCTURE: CPT | Performed by: INTERNAL MEDICINE

## 2020-08-15 PROCEDURE — 80048 BASIC METABOLIC PNL TOTAL CA: CPT | Performed by: INTERNAL MEDICINE

## 2020-08-15 PROCEDURE — 25000132 ZZH RX MED GY IP 250 OP 250 PS 637: Mod: GY | Performed by: INTERNAL MEDICINE

## 2020-08-15 PROCEDURE — 99239 HOSP IP/OBS DSCHRG MGMT >30: CPT | Performed by: INTERNAL MEDICINE

## 2020-08-15 RX ORDER — AMOXICILLIN AND CLAVULANATE POTASSIUM 400; 57 MG/5ML; MG/5ML
800 POWDER, FOR SUSPENSION ORAL 2 TIMES DAILY
Qty: 40 ML | Refills: 0 | Status: SHIPPED | OUTPATIENT
Start: 2020-08-15 | End: 2020-08-17

## 2020-08-15 RX ADMIN — Medication 1 PACKET: at 11:15

## 2020-08-15 RX ADMIN — CARBAMAZEPINE 150 MG: 100 SUSPENSION ORAL at 04:38

## 2020-08-15 RX ADMIN — PANTOPRAZOLE SODIUM 40 MG: 40 TABLET, DELAYED RELEASE ORAL at 08:32

## 2020-08-15 RX ADMIN — Medication 1 PACKET: at 09:12

## 2020-08-15 RX ADMIN — METOCLOPRAMIDE 5 MG: 5 SOLUTION ORAL at 08:36

## 2020-08-15 RX ADMIN — PIPERACILLIN SODIUM AND TAZOBACTAM SODIUM 3.38 G: 3; .375 INJECTION, POWDER, LYOPHILIZED, FOR SOLUTION INTRAVENOUS at 04:42

## 2020-08-15 RX ADMIN — MINERAL SUPPLEMENT IRON 300 MG / 5 ML STRENGTH LIQUID 100 PER BOX UNFLAVORED 220 MG: at 08:35

## 2020-08-15 RX ADMIN — SODIUM BICARBONATE 650 MG TABLET 650 MG: at 08:17

## 2020-08-15 RX ADMIN — Medication 0.8 MG: at 08:39

## 2020-08-15 RX ADMIN — PIPERACILLIN SODIUM AND TAZOBACTAM SODIUM 3.38 G: 3; .375 INJECTION, POWDER, LYOPHILIZED, FOR SOLUTION INTRAVENOUS at 11:27

## 2020-08-15 RX ADMIN — POTASSIUM & SODIUM PHOSPHATES POWDER PACK 280-160-250 MG 1 PACKET: 280-160-250 PACK at 08:17

## 2020-08-15 RX ADMIN — HYDROCORTISONE 20 MG: 20 TABLET ORAL at 08:16

## 2020-08-15 RX ADMIN — ASPIRIN 81 MG 81 MG: 81 TABLET ORAL at 08:17

## 2020-08-15 RX ADMIN — LEVOTHYROXINE SODIUM 150 MCG: 150 TABLET ORAL at 06:35

## 2020-08-15 RX ADMIN — CARBAMAZEPINE 150 MG: 100 SUSPENSION ORAL at 09:14

## 2020-08-15 RX ADMIN — OXYCODONE HYDROCHLORIDE AND ACETAMINOPHEN 1000 MG: 500 TABLET ORAL at 08:17

## 2020-08-15 RX ADMIN — Medication 50 MCG: at 08:16

## 2020-08-15 RX ADMIN — BRIVARACETAM 100 MG: 10 SOLUTION ORAL at 08:32

## 2020-08-15 RX ADMIN — ACETAMINOPHEN 650 MG: 325 TABLET, FILM COATED ORAL at 00:31

## 2020-08-15 ASSESSMENT — ACTIVITIES OF DAILY LIVING (ADL)
ADLS_ACUITY_SCORE: 43

## 2020-08-15 NOTE — DISCHARGE SUMMARY
[facility]  Hospitalist Discharge Summary      Date of Admission:  8/10/2020  Date of Discharge:  8/15/2020  Discharging Provider: Soledad Roberts MD      Discharge Diagnoses   Fever due to suspected aspiration pneumonia, organism unknown  Sepsis, organism unknown, present on admission  Hyponatremia due to dehydration, resolved  Recurrent vomiting  History of seizures  Panhypopituitarism  History of TBI, spastic hemiplegia, severe dysphagia  GERD  Hx Pancreatic cyst     Follow-ups Needed After Discharge   Follow-up Appointments     Follow-up and recommended labs and tests       Follow up with primary care provider, Carlos Gomez, within 7 days for   hospital follow- up.  The following labs/tests are recommended: CBC.             Unresulted Labs Ordered in the Past 30 Days of this Admission     Date and Time Order Name Status Description    8/10/2020 2148 Blood culture Preliminary     8/10/2020 2148 Blood culture Preliminary       These results will be followed up by PMD.  Given pancreatic cyst size of > 3 cm will recommend repeat CT in 6-12 months     Discharge Disposition   Discharged to home  Condition at discharge: Stable      Hospital Course      Keyon Farias is a 59 year old male who presents with fever. Admitted for further evaluation and treatment.      Fever, suspected aspiration pneumonia, hyponatremia:   Rule out covid infection  -Patient with recent admission back in May 2020. Patient with chronic ongoing issues with dysphasia and recurrent aspiration. Patient with aphasia due to history of traumatic brain injury.  Patient with complicated medical history and several medical comorbidities.  -  Patient with fever of 103.4 prompting evaluation in the emergency department and subsequent admission.  Patient had an episode of vomiting prior to this admission, patient lives at home with home care, mother was able to keep him home for 3 months prior to this event.  -Zosyn started for aspiration  pneumonia, patient was febrile with tachycardia, currently afebrile, heart rate improved his lactic acid levels are stabilized.  Blood cultures are negative to date, COVID negative x2  -Chest x-ray shows possible atelectasis, encourage incentive spirometry use, CT of the abdomen did document possible infiltrate on the right side  -Patient is resting in room air, continues to have ongoing tachycardia, he did receive a lot of fluid during his stay here, will try a dose of IV Lasix  -Patient is also on vancomycin which was started on 8/11 and azithromycin.  Currently patient is not on any antibiotics other than Zosyn.  Can transition to Augmentin at the time of discharge.  Continue isolation precautions, nebulizations and aspiration precautions.      Hyponatremia  ---Currently corrected, will reduce free water intake to 200 mg with each flush.  --Continue his steroid supplementation for adrenal insufficiency.     Multiple electrolyte abnormalities including hypophosphatemia  Replace        Recurrent vomiting  -per mother occurs about 6 times per week, chronically  With resultant aspiration pneumonias in past and on this admission  On reglan  -GI was consulted, they recommended getting CT abdomen and pelvis, CT abdomen and pelvis does not indicate any reason for ongoing nausea and vomiting.  Pancreatic cyst is remaining stable.     History of seizures: Stable.no sz activity  -Continue his PTA seizure medication including Tegretol and Bevracetam     Panhypopituitarism: Patient on levothyroxine and hydrocortisone prior to admission.  -Continue prior to admission hydrocortisone  -Continue prior to admission levothyroxine      Stopped stress dose steroid and restarted his PTA dosage.        Hyponatremia- resolved. 2/2 dehydration  Unclear if 2/2 to missing dose of hydrocortisone  Resolved, he did receive IV hydration during the stay here.     History of TBI, spastic hemiplegia, severe dysphagia: Patient with G-tube and  unable to take p.o. medications.  -appears at baseline mental status  -Nutrition consulted for tube feeds, continue his home tube feeds     GERD: Stable.  -Continue prior to admission pantoprazole .        Hx Pancreatic cyst - noted on CT abd 5/2019- 3.9 cm cyst in the tail of the pancreas, increased in size since 5/16/2018.   No comment on this on CT abd 9/2019.   CT abdomen and pelvis ordered on 8/13, this is just still present but no significant change in size noted, pancreatic enzymes are repeated today which is within normal limit.       Code Status: Full Code        Consultations This Hospital Stay   PHARMACY TO DOSE [facility]  NUTRITION SERVICES ADULT IP CONSULT  PHARMACY IP CONSULT  PHARMACY TO DOSE [facility]  GASTROENTEROLOGY IP CONSULT    Code Status   Full Code    Time Spent on this Encounter   I, Soledad Roberts MD, personally saw the patient today and spent greater than 30 minutes discharging this patient.       Soledad Roberts MD  [facility]  ______________________________________________________________________    Physical Exam   Vital Signs: Temp: 98.5  F (36.9  C) Temp src: Axillary BP: 131/71   Heart Rate: 75 Resp: 20 SpO2: 98 % O2 Device: None (Room air)    Weight: 163 lbs 9.3 oz  I saw and examined the patient on the date of discharge.       Primary Care Physician   Carlos Gomez    Discharge Orders      Home care nursing referral      Reason for your hospital stay    You had a fever, possibly due to aspiration pneumonia.     Follow-up and recommended labs and tests     Follow up with primary care provider, Carlos Gomez, within 7 days for hospital follow- up.  The following labs/tests are recommended: CBC.     Activity    Your activity upon discharge: activity as tolerated     MD face to face encounter    Documentation of Face to Face and Certification for Home Health Services    I certify that patient: Keyon Farias is under my care and that I, or a nurse practitioner or physician's  assistant working with me, had a face-to-face encounter that meets the physician face-to-face encounter requirements with this patient on: 8/15/2020.    This encounter with the patient was in whole, or in part, for the following medical condition, which is the primary reason for home health care: dysphagia.    I certify that, based on my findings, the following services are medically necessary home health services: Nursing.    My clinical findings support the need for the above services because: Nurse is needed: For complex aftercare of surgical procedures because the patient needs instruction and cannot perform care on their own due to: dysphagia..    Further, I certify that my clinical findings support that this patient is homebound (i.e. absences from home require considerable and taxing effort and are for medical reasons or [facility] services or infrequently or of short duration when for other reasons) because: Requires assistance of another person or specialized equipment to access medical services because patient: Is unable to operate assistive equipment on their own...    Based on the above findings. I certify that this patient is confined to the home and needs intermittent skilled nursing care, physical therapy and/or speech therapy.  The patient is under my care, and I have initiated the establishment of the plan of care.  This patient will be followed by a physician who will periodically review the plan of care.  Physician/Provider to provide follow up care: Carlos Gomez    Attending hospital physician (the Medicare certified [place] provider): Soledad Roberts MD  Physician Signature: See electronic signature associated with these discharge orders.  Date: 8/15/2020     Full Code     Diet    Follow this diet upon discharge: Orders Placed This Encounter      Adult Formula Drip Feeding: Continuous Isosource 1.5; Jejunostomy; Goal Rate: 100; mL/hr; From: 8:00 AM; 8:00 PM; Medication - Feeding Tube Flush  Frequency: At least 15-30 mL water before and after medication administration and with tube clogging;...      NPO for Medical/Clinical Reasons Except for: No Exceptions       Significant Results and Procedures   Most Recent 3 CBC's:  Recent Labs   Lab Test 08/14/20  0623 08/13/20  0934 08/12/20  1613   WBC 9.4 15.2* 22.5*   HGB 9.5* 10.9* 10.9*   MCV 88 89 89   * 154 151     Most Recent 2 LFT's:  Recent Labs   Lab Test 08/12/20  0635 08/10/20  2153   AST 13 16   ALT 18 28   ALKPHOS 77 108   BILITOTAL 0.5 0.7   ,   Results for orders placed or performed during the hospital encounter of 08/10/20   XR Chest Port 1 View    Narrative    EXAM: XR CHEST PORT 1 VW  LOCATION: [facility]  DATE/TIME: 8/10/2020 10:00 PM    INDICATION: Fever  COMPARISON: 03/23/2020      Impression    IMPRESSION: Stable cardia mediastinal silhouette. Shallow inspiration and slight elevation right hemidiaphragm. Mild right basilar atelectasis or infiltrate.. No vascular congestion or significant effusion.   XR Chest Port 1 View    Narrative    XR CHEST PORT 1 VW  8/11/2020 8:45 AM       INDICATION: fever, dysphagia, rule out infiltrate post fluids  COMPARISON: 8/10/2020       Impression    IMPRESSION: Lung volumes are low with elevated right hemidiaphragm.  Persistent opacities in both lung bases, right greater than left most  likely represent atelectasis. Right-sided pneumonia would be difficult  to exclude.    SISI FONTANEZ MD   XR Abdomen Port 2 Views    Narrative    ABDOMEN PORTABLE TWO VIEWS   8/12/2020 11:40 AM     HISTORY: Rule out obstructive bowel gas pattern. Presents with emesis.    COMPARISON: Intraoperative gastric tube placement evaluation dated  6/24/2020, abdominal x-rays dated 1/29/2020.     FINDINGS: Percutaneous gastrojejunostomy tube is noted and appears  projected in the appropriate position. There is a nonspecific bowel  gas pattern with gas seen both in large and small bowel. No abnormally  dilated  gas-filled loops of bowel to suggest bowel obstruction.  Radiopaque stool is seen in the distal colon which could represent  prior oral contrast administration. This could also represent  ingestion of bismuth containing material such as Pepto-Bismol. No  definite renal or ureteral calculus is identified. No free air is seen  around the liver edge on the left lateral decubitus image.      Impression    IMPRESSION:  1. Mildly nonspecific bowel gas pattern without evidence for bowel  obstruction.  2. Percutaneous gastrojejunostomy tube appears in appropriate  position.  3. No evidence for urinary system calculus or free intraperitoneal  air.    SISI ALY MD   CT Abdomen Pelvis w Contrast    Narrative    CT ABDOMEN PELVIS WITH CONTRAST 8/13/2020 6:01 PM    CLINICAL HISTORY: Abdominal distension.    TECHNIQUE: CT scan of the abdomen and pelvis was performed following  injection of IV contrast. Multiplanar reformats were obtained. Dose  reduction techniques were used.  CONTRAST: 82mL Isovue-370    COMPARISON: Abdominal x-ray 8/12/2020. CT chest abdomen pelvis  9/17/2019.    FINDINGS:   LOWER CHEST: Right lung base infiltrate is present possibly reflecting  pneumonia. Left lung base is clear.    HEPATOBILIARY: Normal.    PANCREAS: There is a 3.2 cm cyst at the tail of the pancreas. Motion  artifact and beam hardening artifact through the region of the  pancreatic head and body is noted. Cannot exclude mild surrounding  peripancreatic inflammatory changes in the region of the pancreatic  head. Correlate with patient's pancreatic enzymes to exclude the  possibility of pancreatitis.    SPLEEN: Normal.    ADRENAL GLANDS: Normal.    KIDNEYS/BLADDER: Normal.    BOWEL: Previously administered oral contrast is present within the  colon and rectum. No evidence of bowel obstruction. No evidence of  diverticulitis. Appendix is normal. Gastrostomy tube appears in good  position within the gastric lumen. Jejunal extension tubing  terminates  in the proximal jejunum.    LYMPH NODES: No enlarged abdominal or pelvic lymph nodes.    VASCULATURE: Unremarkable.    PELVIC ORGANS: The bladder, prostate and rectum are unremarkable.    ADDITIONAL FINDINGS: None.    MUSCULOSKELETAL: Normal.      Impression    IMPRESSION:   1.  Artifact through the upper abdomen in the region of the pancreas  as described. Correlate with patient's pancreatic enzymes to exclude  the possibility of pancreatitis. Pancreatic tail cyst is noted and  unchanged.    2.  Gastrostomy tube is in good position within the gastric lumen. No  bowel obstruction.    3.  Posterior right lung base infiltrate concerning for pneumonia.    GEE BOWMAN MD       Discharge Medications   Current Discharge Medication List      START taking these medications    Details   amoxicillin-clavulanate (AUGMENTIN) 400-57 MG/5ML suspension Take 10 mLs (800 mg) by mouth 2 times daily for 2 days  Qty: 40 mL, Refills: 0    Associated Diagnoses: Fever in other diseases         CONTINUE these medications which have NOT CHANGED    Details   acetylcysteine (MUCOMYST) 20 % neb solution Take 2 mLs by nebulization 4 times daily With albuterol at 0700, 1100, 1500, and 1900       albuterol (PROVENTIL) (5 MG/ML) 0.5% neb solution Take 2.5 mg by nebulization every 4 hours (while awake) 0700 1100 1500 1900 with mucomyst       aspirin (ASA) 81 MG chewable tablet 81 mg by Oral or Feeding Tube route daily At 0900      bacitracin ointment Apply topically daily as needed for wound care To PEG site.       Brivaracetam (BRIVIACT) 10 MG/ML solution 100 mg by Oral or Feeding Tube route 2 times daily 0900, 2100      calcium carbonate 1250 (500 CA) MG/5ML SUSP suspension 5 mLs (1,250 mg) by Per J Tube route 3 times daily (with meals)  Qty: 450 mL    Associated Diagnoses: Malnutrition (H)      carBAMazepine (TEGRETOL) 100 MG/5ML suspension 150 mg by Oral or Feeding Tube route every 6 hours At 06:00, 12:00, 18:00 and 24:00 for  seizures       ferrous sulfate 220 (44 Fe) MG/5ML ELIX 220 mg by Per Feeding Tube route daily      Guar Gum (FIBER MODULAR, NUTRISOURCE FIBER,) packet 1 packet by Per G Tube route daily  Qty: 30 packet, Refills: 0    Associated Diagnoses: Pneumonia of both lower lobes due to infectious organism      !! hydrocortisone (CORTEF) 5 MG tablet 10 mg by Oral or FT or NG tube route daily (with dinner) At 1500      !! hydrocortisone (CORTEF) 5 MG tablet 20 mg by Oral or FT or NG tube route every morning       hydrocortisone 1 % CREA cream Place rectally 2 times daily as needed for other Apply to reddened memo areas as needed      levothyroxine (SYNTHROID/LEVOTHROID) 150 MCG tablet Take 150 mcg by mouth every morning      melatonin (MELATONIN) 1 MG/ML LIQD liquid 6 mg by Per NG tube route At Bedtime       metoclopramide (REGLAN) 5 MG/5ML solution 5 mg by Per Feeding Tube route 2 times daily      miconazole (MICATIN) 2 % AERP powder Apply topically 2 times daily as needed       Multiple Vitamins-Minerals (EMERGEN-C VITAMIN C) PACK 1 packet by Oral or Feeding Tube route daily Vitamin C 1000 mg      mupirocin (BACTROBAN) 2 % external ointment Apply topically 2 times daily as needed       pantoprazole (PROTONIX) 2 mg/mL SUSP suspension 20 mLs (40 mg) by Per J Tube route daily  Qty: 400 mL, Refills: 1    Associated Diagnoses: Gastroesophageal reflux disease, esophagitis presence not specified      potassium & sodium phosphates (NEUTRA-PHOS) 280-160-250 MG Packet Take 1 packet by mouth 3 times daily       Scopolamine HBr POWD Dispense #90. Mix contents with small amount of water for admin via J-tube.  Administer 0.8 mg three times each day.  Qty: 90 Bottle, Refills: 11    Associated Diagnoses: Aspiration pneumonia (H)      Skin Protectants, Misc. (BALMEX SKIN PROTECTANT) OINT Externally apply topically 2 times daily as needed (irritation) Applay to reddened memo areas twice daily as needed      sodium bicarbonate 650 MG tablet Take  1 tablet (650 mg) by mouth daily  Qty: 30 tablet, Refills: 0    Associated Diagnoses: Hyponatremia      testosterone cypionate (DEPOTESTOTERONE CYPIONATE) 200 MG/ML injection Inject 76 mg into the muscle See Admin Instructions Every 2 weeks on Thursdays  76 mg or 0.38 mL      vitamin D3 (CHOLECALCIFEROL) 2000 units (50 mcg) tablet Take 2,000 Units by mouth daily Crush and feed via j-tube @@ 0900      order for DME Equipment being ordered: Nebulizer  Qty: 1 Units, Refills: 0    Associated Diagnoses: Pneumonia of both lower lobes due to infectious organism       !! - Potential duplicate medications found. Please discuss with provider.      STOP taking these medications       Bioflavonoid Products (VITAMIN C PLUS) 1000 MG TABS Comments:   Reason for Stopping:             Allergies   Allergies   Allergen Reactions     Dilantin [Phenytoin Sodium]      Scopolamine Hives     Hives in response to scopolamine PATCH      Valproic Acid      Toxicity c bone marrow suspension, elevated ammonia levels

## 2020-08-15 NOTE — PROGRESS NOTES
Updated by staff that patient can discharge. I called and spoke to mother, Savannah, and she was anticipating discharge today. Savannah would like a phone update from MD and I notified provider today. Savannah requests a stretcher ride home, ramp available, she will coordinate with PCA services.     [employer] called at [phone] and I spoke to Reyna. S stretcher transport arranged for ride home to [place] address. Address, [address] [place] [id number]  was confirmed with Savannah. Stretcher ride time for today is 1300.     PCS and facesheet faxed to [employer] at [phone].    Bedside staff updated with ride time.     [facility] called at [phone] and left a message for on call staff to call back regarding discharge today. Orders, and Discharge Summary faxed to agency at  [phone].

## 2020-08-15 NOTE — PLAN OF CARE
Discharge    Patient discharged to home via [facility]  Care plan note  Patient is a full code, nonverbal, and vital signs are stable on room air. Afebrile this shift. No nonverbal indicators of pain noted. Patient is an assist x2, with the mechanical lift. Patient is NPO and on tube feedings. GJ tube was flushed and clamped prior to discharge. Patient is incontinent of bowel/bladder, no BM today. BS active in all 4 quadrants. Lung sounds were diminished. Tele was NSR. Blanchable redness to coccyx, mepilex in place, CDI. PIV was removed prior to discharge. Belongings were returned to patient. The AVS was reviewed with patient. Also placed detailed instructions for the new medication for the home care nurse to refer to.      Listed belongings gathered and returned to patient. Yes  Care Plan and Patient education resolved: Yes  Prescriptions if needed, hard copies sent with patient  NA  Home and hospital acquired medications returned to patient: Yes  Medication Bin checked and emptied on discharge Yes  Follow up appointment made for patient: No

## 2020-08-15 NOTE — PLAN OF CARE
DATE & TIME: 8/14/2020 0998-0052    Cognitive Concerns/ Orientation : Pt nonverbal, HECTOR orientation. Calm, cooperative.   BEHAVIOR & AGGRESSION TOOL COLOR: Green   ABNL VS/O2: VSS on RA, except temp max 99.8  MOBILITY: Assist x2 with lift, fall risk  PAIN MANAGMENT: Denies  DIET: NPO with tube feeding from 8am-8pm  BOWEL/BLADDER: Incontinent of bowel and bladder  DRAIN/DEVICES: IV SL, Gtube clamped  TELEMETRY RHYTHM: NSR  SKIN: Bruised, redness blanchable  D/C DAY/GOALS/PLACE: Plan for possible discharge home today or Sunday  OTHER IMPORTANT INFO: Lung sounds coarse/diminished. Infrequent loose/nonproductive cough. Reposition q2h.

## 2020-08-15 NOTE — PROGRESS NOTES
DATE & TIME: 8/14 9929-1998    Cognitive Concerns/ Orientation : Alert, oriented to self, HECTOR the rest   BEHAVIOR & AGGRESSION TOOL COLOR: green  CIWA SCORE: NA   ABNL VS/O2: VSS, slight tachy at times low 100's   MOBILITY: lift  PAIN MANAGMENT: HECTOR, appears comfortable  DIET: NPO, TF  BOWEL/BLADDER: incontinent b/b  ABNL LAB/BG: hgb 9.5 but stable  DRAIN/DEVICES: Gtube clamped, PIV SL  TELEMETRY RHYTHM: NSR  SKIN: intact, bruised  TESTS/PROCEDURES: none  D/C DAY/GOALS/PLACE: likely discontinue 1-2 days if afebrile?  OTHER IMPORTANT INFO: loose cough, can be resistant to turns/oral cares  MD/RN ROUNDING SIGNED OFF D/E SHIFT: ----  COMMIT TO SIT DONE AND SIGNED OFF yes

## 2020-08-20 ENCOUNTER — HOSPITAL ENCOUNTER (INPATIENT)
Facility: CLINIC | Age: 60
LOS: 3 days | Discharge: HOME HEALTH | DRG: 871 | End: 2020-08-24
Attending: EMERGENCY MEDICINE | Admitting: INTERNAL MEDICINE
Payer: MEDICARE

## 2020-08-20 DIAGNOSIS — E87.1 HYPONATREMIA: ICD-10-CM

## 2020-08-20 DIAGNOSIS — K21.9 GASTROESOPHAGEAL REFLUX DISEASE, ESOPHAGITIS PRESENCE NOT SPECIFIED: ICD-10-CM

## 2020-08-20 DIAGNOSIS — R50.9 FEVER, UNSPECIFIED FEVER CAUSE: ICD-10-CM

## 2020-08-20 DIAGNOSIS — G40.803 INTRACTABLE EPILEPSY DUE TO EXTERNAL CAUSES WITH STATUS EPILEPTICUS (H): ICD-10-CM

## 2020-08-20 DIAGNOSIS — J69.0 ASPIRATION PNEUMONIA, UNSPECIFIED ASPIRATION PNEUMONIA TYPE, UNSPECIFIED LATERALITY, UNSPECIFIED PART OF LUNG (H): ICD-10-CM

## 2020-08-20 DIAGNOSIS — J69.0 ASPIRATION PNEUMONIA OF RIGHT MIDDLE LOBE, UNSPECIFIED ASPIRATION PNEUMONIA TYPE (H): Primary | ICD-10-CM

## 2020-08-20 LAB — INTERPRETATION ECG - MUSE: NORMAL

## 2020-08-20 PROCEDURE — 93005 ELECTROCARDIOGRAM TRACING: CPT

## 2020-08-20 PROCEDURE — 27210179 ZZH KIT MONITOR CVP

## 2020-08-20 PROCEDURE — 99285 EMERGENCY DEPT VISIT HI MDM: CPT | Mod: 25

## 2020-08-20 PROCEDURE — C9803 HOPD COVID-19 SPEC COLLECT: HCPCS

## 2020-08-20 PROCEDURE — 36556 INSERT NON-TUNNEL CV CATH: CPT

## 2020-08-20 RX ORDER — PIPERACILLIN SODIUM, TAZOBACTAM SODIUM 4; .5 G/20ML; G/20ML
4.5 INJECTION, POWDER, LYOPHILIZED, FOR SOLUTION INTRAVENOUS ONCE
Status: COMPLETED | OUTPATIENT
Start: 2020-08-20 | End: 2020-08-21

## 2020-08-21 ENCOUNTER — APPOINTMENT (OUTPATIENT)
Dept: GENERAL RADIOLOGY | Facility: CLINIC | Age: 60
DRG: 871 | End: 2020-08-21
Attending: EMERGENCY MEDICINE
Payer: MEDICARE

## 2020-08-21 LAB
ALBUMIN SERPL-MCNC: 2.7 G/DL (ref 3.4–5)
ALP SERPL-CCNC: 131 U/L (ref 40–150)
ALT SERPL W P-5'-P-CCNC: 59 U/L (ref 0–70)
ANION GAP SERPL CALCULATED.3IONS-SCNC: 6 MMOL/L (ref 3–14)
ANION GAP SERPL CALCULATED.3IONS-SCNC: 6 MMOL/L (ref 3–14)
AST SERPL W P-5'-P-CCNC: 51 U/L (ref 0–45)
BASE EXCESS BLDV CALC-SCNC: 2.5 MMOL/L
BASOPHILS # BLD AUTO: 0 10E9/L (ref 0–0.2)
BASOPHILS NFR BLD AUTO: 0.2 %
BILIRUB DIRECT SERPL-MCNC: 0.1 MG/DL (ref 0–0.2)
BILIRUB SERPL-MCNC: 0.3 MG/DL (ref 0.2–1.3)
BUN SERPL-MCNC: 15 MG/DL (ref 7–30)
BUN SERPL-MCNC: 15 MG/DL (ref 7–30)
CALCIUM SERPL-MCNC: 7.6 MG/DL (ref 8.5–10.1)
CALCIUM SERPL-MCNC: 7.8 MG/DL (ref 8.5–10.1)
CHLORIDE SERPL-SCNC: 100 MMOL/L (ref 94–109)
CHLORIDE SERPL-SCNC: 89 MMOL/L (ref 94–109)
CO2 SERPL-SCNC: 23 MMOL/L (ref 20–32)
CO2 SERPL-SCNC: 25 MMOL/L (ref 20–32)
CREAT SERPL-MCNC: 0.76 MG/DL (ref 0.66–1.25)
CREAT SERPL-MCNC: 0.86 MG/DL (ref 0.66–1.25)
DIFFERENTIAL METHOD BLD: ABNORMAL
EOSINOPHIL # BLD AUTO: 0.3 10E9/L (ref 0–0.7)
EOSINOPHIL NFR BLD AUTO: 1.8 %
ERYTHROCYTE [DISTWIDTH] IN BLOOD BY AUTOMATED COUNT: 13.4 % (ref 10–15)
ERYTHROCYTE [DISTWIDTH] IN BLOOD BY AUTOMATED COUNT: 13.5 % (ref 10–15)
GFR SERPL CREATININE-BSD FRML MDRD: >90 ML/MIN/{1.73_M2}
GFR SERPL CREATININE-BSD FRML MDRD: >90 ML/MIN/{1.73_M2}
GLUCOSE BLDC GLUCOMTR-MCNC: 110 MG/DL (ref 70–99)
GLUCOSE BLDC GLUCOMTR-MCNC: 134 MG/DL (ref 70–99)
GLUCOSE BLDC GLUCOMTR-MCNC: 223 MG/DL (ref 70–99)
GLUCOSE SERPL-MCNC: 122 MG/DL (ref 70–99)
GLUCOSE SERPL-MCNC: 93 MG/DL (ref 70–99)
HCO3 BLDV-SCNC: 26 MMOL/L (ref 21–28)
HCT VFR BLD AUTO: 31.7 % (ref 40–53)
HCT VFR BLD AUTO: 34.7 % (ref 40–53)
HGB BLD-MCNC: 10.8 G/DL (ref 13.3–17.7)
HGB BLD-MCNC: 12.1 G/DL (ref 13.3–17.7)
IMM GRANULOCYTES # BLD: 0.1 10E9/L (ref 0–0.4)
IMM GRANULOCYTES NFR BLD: 0.7 %
L PNEUMO1 AG UR QL IA: NORMAL
LABORATORY COMMENT REPORT: NORMAL
LACTATE BLD-SCNC: 1.1 MMOL/L (ref 0.7–2)
LACTATE BLD-SCNC: 1.4 MMOL/L (ref 0.7–2)
LYMPHOCYTES # BLD AUTO: 2.1 10E9/L (ref 0.8–5.3)
LYMPHOCYTES NFR BLD AUTO: 13 %
MAGNESIUM SERPL-MCNC: 2.4 MG/DL (ref 1.6–2.3)
MCH RBC QN AUTO: 29.3 PG (ref 26.5–33)
MCH RBC QN AUTO: 29.9 PG (ref 26.5–33)
MCHC RBC AUTO-ENTMCNC: 34.1 G/DL (ref 31.5–36.5)
MCHC RBC AUTO-ENTMCNC: 34.9 G/DL (ref 31.5–36.5)
MCV RBC AUTO: 86 FL (ref 78–100)
MCV RBC AUTO: 86 FL (ref 78–100)
MONOCYTES # BLD AUTO: 0.9 10E9/L (ref 0–1.3)
MONOCYTES NFR BLD AUTO: 5.5 %
NEUTROPHILS # BLD AUTO: 12.9 10E9/L (ref 1.6–8.3)
NEUTROPHILS NFR BLD AUTO: 78.8 %
NRBC # BLD AUTO: 0 10*3/UL
NRBC BLD AUTO-RTO: 0 /100
OXYHGB MFR BLDV: 70 %
PCO2 BLDV: 36 MM HG (ref 40–50)
PH BLDV: 7.47 PH (ref 7.32–7.43)
PHOSPHATE SERPL-MCNC: 2.4 MG/DL (ref 2.5–4.5)
PLATELET # BLD AUTO: 345 10E9/L (ref 150–450)
PLATELET # BLD AUTO: 354 10E9/L (ref 150–450)
PO2 BLDV: 35 MM HG (ref 25–47)
POTASSIUM SERPL-SCNC: 4.7 MMOL/L (ref 3.4–5.3)
POTASSIUM SERPL-SCNC: 4.7 MMOL/L (ref 3.4–5.3)
PROCALCITONIN SERPL-MCNC: 0.08 NG/ML
PROT SERPL-MCNC: 8.5 G/DL (ref 6.8–8.8)
RBC # BLD AUTO: 3.68 10E12/L (ref 4.4–5.9)
RBC # BLD AUTO: 4.05 10E12/L (ref 4.4–5.9)
SARS-COV-2 RNA SPEC QL NAA+PROBE: NEGATIVE
SARS-COV-2 RNA SPEC QL NAA+PROBE: NORMAL
SODIUM SERPL-SCNC: 120 MMOL/L (ref 133–144)
SODIUM SERPL-SCNC: 129 MMOL/L (ref 133–144)
SODIUM SERPL-SCNC: 131 MMOL/L (ref 133–144)
SODIUM SERPL-SCNC: 133 MMOL/L (ref 133–144)
SODIUM SERPL-SCNC: 136 MMOL/L (ref 133–144)
SODIUM SERPL-SCNC: 137 MMOL/L (ref 133–144)
SPECIMEN SOURCE: NORMAL
TROPONIN I SERPL-MCNC: <0.015 UG/L (ref 0–0.04)
WBC # BLD AUTO: 17.1 10E9/L (ref 4–11)
WBC # BLD AUTO: 20.8 10E9/L (ref 4–11)

## 2020-08-21 PROCEDURE — 87040 BLOOD CULTURE FOR BACTERIA: CPT | Performed by: EMERGENCY MEDICINE

## 2020-08-21 PROCEDURE — 84100 ASSAY OF PHOSPHORUS: CPT | Performed by: INTERNAL MEDICINE

## 2020-08-21 PROCEDURE — 25000128 H RX IP 250 OP 636: Performed by: INTERNAL MEDICINE

## 2020-08-21 PROCEDURE — 00000146 ZZHCL STATISTIC GLUCOSE BY METER IP

## 2020-08-21 PROCEDURE — 12000000 ZZH R&B MED SURG/OB

## 2020-08-21 PROCEDURE — 96375 TX/PRO/DX INJ NEW DRUG ADDON: CPT

## 2020-08-21 PROCEDURE — 36415 COLL VENOUS BLD VENIPUNCTURE: CPT | Performed by: INTERNAL MEDICINE

## 2020-08-21 PROCEDURE — 85025 COMPLETE CBC W/AUTO DIFF WBC: CPT | Performed by: EMERGENCY MEDICINE

## 2020-08-21 PROCEDURE — 82805 BLOOD GASES W/O2 SATURATION: CPT | Performed by: EMERGENCY MEDICINE

## 2020-08-21 PROCEDURE — 25000132 ZZH RX MED GY IP 250 OP 250 PS 637: Mod: GY | Performed by: INTERNAL MEDICINE

## 2020-08-21 PROCEDURE — 87899 AGENT NOS ASSAY W/OPTIC: CPT | Performed by: EMERGENCY MEDICINE

## 2020-08-21 PROCEDURE — 25800030 ZZH RX IP 258 OP 636: Performed by: INTERNAL MEDICINE

## 2020-08-21 PROCEDURE — 94640 AIRWAY INHALATION TREATMENT: CPT | Mod: 76

## 2020-08-21 PROCEDURE — 84145 PROCALCITONIN (PCT): CPT | Performed by: EMERGENCY MEDICINE

## 2020-08-21 PROCEDURE — 99223 1ST HOSP IP/OBS HIGH 75: CPT | Mod: AI | Performed by: INTERNAL MEDICINE

## 2020-08-21 PROCEDURE — 40000275 ZZH STATISTIC RCP TIME EA 10 MIN

## 2020-08-21 PROCEDURE — 96374 THER/PROPH/DIAG INJ IV PUSH: CPT

## 2020-08-21 PROCEDURE — 83735 ASSAY OF MAGNESIUM: CPT | Performed by: INTERNAL MEDICINE

## 2020-08-21 PROCEDURE — 40000257 ZZH STATISTIC CONSULT NO CHARGE VASC ACCESS

## 2020-08-21 PROCEDURE — 25000125 ZZHC RX 250: Performed by: INTERNAL MEDICINE

## 2020-08-21 PROCEDURE — 80048 BASIC METABOLIC PNL TOTAL CA: CPT | Performed by: EMERGENCY MEDICINE

## 2020-08-21 PROCEDURE — 83605 ASSAY OF LACTIC ACID: CPT | Performed by: EMERGENCY MEDICINE

## 2020-08-21 PROCEDURE — 94640 AIRWAY INHALATION TREATMENT: CPT

## 2020-08-21 PROCEDURE — 25800030 ZZH RX IP 258 OP 636: Performed by: EMERGENCY MEDICINE

## 2020-08-21 PROCEDURE — 99207 ZZC APP CREDIT; MD BILLING SHARED VISIT: CPT | Performed by: INTERNAL MEDICINE

## 2020-08-21 PROCEDURE — 85027 COMPLETE CBC AUTOMATED: CPT | Performed by: INTERNAL MEDICINE

## 2020-08-21 PROCEDURE — 25000128 H RX IP 250 OP 636: Performed by: EMERGENCY MEDICINE

## 2020-08-21 PROCEDURE — 84295 ASSAY OF SERUM SODIUM: CPT | Performed by: INTERNAL MEDICINE

## 2020-08-21 PROCEDURE — 84484 ASSAY OF TROPONIN QUANT: CPT | Performed by: EMERGENCY MEDICINE

## 2020-08-21 PROCEDURE — 83605 ASSAY OF LACTIC ACID: CPT | Performed by: INTERNAL MEDICINE

## 2020-08-21 PROCEDURE — 80076 HEPATIC FUNCTION PANEL: CPT | Performed by: EMERGENCY MEDICINE

## 2020-08-21 PROCEDURE — U0003 INFECTIOUS AGENT DETECTION BY NUCLEIC ACID (DNA OR RNA); SEVERE ACUTE RESPIRATORY SYNDROME CORONAVIRUS 2 (SARS-COV-2) (CORONAVIRUS DISEASE [COVID-19]), AMPLIFIED PROBE TECHNIQUE, MAKING USE OF HIGH THROUGHPUT TECHNOLOGIES AS DESCRIBED BY CMS-2020-01-R: HCPCS | Performed by: EMERGENCY MEDICINE

## 2020-08-21 PROCEDURE — 80048 BASIC METABOLIC PNL TOTAL CA: CPT | Performed by: INTERNAL MEDICINE

## 2020-08-21 PROCEDURE — 71045 X-RAY EXAM CHEST 1 VIEW: CPT

## 2020-08-21 RX ORDER — POTASSIUM CL/LIDO/0.9 % NACL 10MEQ/0.1L
10 INTRAVENOUS SOLUTION, PIGGYBACK (ML) INTRAVENOUS
Status: DISCONTINUED | OUTPATIENT
Start: 2020-08-21 | End: 2020-08-24 | Stop reason: HOSPADM

## 2020-08-21 RX ORDER — ACETYLCYSTEINE 200 MG/ML
2 SOLUTION ORAL; RESPIRATORY (INHALATION) 4 TIMES DAILY
Status: DISCONTINUED | OUTPATIENT
Start: 2020-08-21 | End: 2020-08-24 | Stop reason: HOSPADM

## 2020-08-21 RX ORDER — ASCORBIC ACID 500 MG
500 TABLET ORAL DAILY
Status: DISCONTINUED | OUTPATIENT
Start: 2020-08-21 | End: 2020-08-24 | Stop reason: HOSPADM

## 2020-08-21 RX ORDER — HYDROCORTISONE 20 MG/1
20 TABLET ORAL EVERY MORNING
Status: DISCONTINUED | OUTPATIENT
Start: 2020-08-21 | End: 2020-08-24 | Stop reason: HOSPADM

## 2020-08-21 RX ORDER — POTASSIUM CHLORIDE 29.8 MG/ML
20 INJECTION INTRAVENOUS
Status: DISCONTINUED | OUTPATIENT
Start: 2020-08-21 | End: 2020-08-24 | Stop reason: HOSPADM

## 2020-08-21 RX ORDER — SCOPOLAMINE HYDROBROMIDE
0.8 POWDER (GRAM) MISCELLANEOUS 3 TIMES DAILY
Status: DISCONTINUED | OUTPATIENT
Start: 2020-08-21 | End: 2020-08-21 | Stop reason: RX

## 2020-08-21 RX ORDER — ASPIRIN 81 MG/1
81 TABLET, CHEWABLE ORAL DAILY
Status: DISCONTINUED | OUTPATIENT
Start: 2020-08-21 | End: 2020-08-24 | Stop reason: HOSPADM

## 2020-08-21 RX ORDER — AMOXICILLIN 250 MG
2 CAPSULE ORAL 2 TIMES DAILY PRN
Status: DISCONTINUED | OUTPATIENT
Start: 2020-08-21 | End: 2020-08-24 | Stop reason: HOSPADM

## 2020-08-21 RX ORDER — LIDOCAINE 40 MG/G
CREAM TOPICAL
Status: DISCONTINUED | OUTPATIENT
Start: 2020-08-21 | End: 2020-08-21

## 2020-08-21 RX ORDER — LIDOCAINE 40 MG/G
CREAM TOPICAL
Status: DISCONTINUED | OUTPATIENT
Start: 2020-08-21 | End: 2020-08-24 | Stop reason: HOSPADM

## 2020-08-21 RX ORDER — SODIUM CHLORIDE, SODIUM LACTATE, POTASSIUM CHLORIDE, CALCIUM CHLORIDE 600; 310; 30; 20 MG/100ML; MG/100ML; MG/100ML; MG/100ML
INJECTION, SOLUTION INTRAVENOUS CONTINUOUS
Status: DISCONTINUED | OUTPATIENT
Start: 2020-08-21 | End: 2020-08-21

## 2020-08-21 RX ORDER — DEXTROSE MONOHYDRATE 50 MG/ML
INJECTION, SOLUTION INTRAVENOUS CONTINUOUS
Status: DISCONTINUED | OUTPATIENT
Start: 2020-08-21 | End: 2020-08-22

## 2020-08-21 RX ORDER — ALBUTEROL SULFATE 0.83 MG/ML
2.5 SOLUTION RESPIRATORY (INHALATION)
Status: DISCONTINUED | OUTPATIENT
Start: 2020-08-21 | End: 2020-08-24 | Stop reason: HOSPADM

## 2020-08-21 RX ORDER — ACETAMINOPHEN 325 MG/1
650 TABLET ORAL EVERY 4 HOURS PRN
Status: DISCONTINUED | OUTPATIENT
Start: 2020-08-21 | End: 2020-08-24 | Stop reason: HOSPADM

## 2020-08-21 RX ORDER — DEXTROSE MONOHYDRATE 100 MG/ML
INJECTION, SOLUTION INTRAVENOUS CONTINUOUS PRN
Status: DISCONTINUED | OUTPATIENT
Start: 2020-08-21 | End: 2020-08-24 | Stop reason: HOSPADM

## 2020-08-21 RX ORDER — NALOXONE HYDROCHLORIDE 0.4 MG/ML
.1-.4 INJECTION, SOLUTION INTRAMUSCULAR; INTRAVENOUS; SUBCUTANEOUS
Status: DISCONTINUED | OUTPATIENT
Start: 2020-08-21 | End: 2020-08-24 | Stop reason: HOSPADM

## 2020-08-21 RX ORDER — ONDANSETRON 4 MG/1
4 TABLET, ORALLY DISINTEGRATING ORAL EVERY 6 HOURS PRN
Status: DISCONTINUED | OUTPATIENT
Start: 2020-08-21 | End: 2020-08-24 | Stop reason: HOSPADM

## 2020-08-21 RX ORDER — PIPERACILLIN SODIUM, TAZOBACTAM SODIUM 3; .375 G/15ML; G/15ML
3.38 INJECTION, POWDER, LYOPHILIZED, FOR SOLUTION INTRAVENOUS EVERY 6 HOURS
Status: DISCONTINUED | OUTPATIENT
Start: 2020-08-21 | End: 2020-08-23

## 2020-08-21 RX ORDER — POTASSIUM CHLORIDE 7.45 MG/ML
10 INJECTION INTRAVENOUS
Status: DISCONTINUED | OUTPATIENT
Start: 2020-08-21 | End: 2020-08-24 | Stop reason: HOSPADM

## 2020-08-21 RX ORDER — AMOXICILLIN 250 MG
1 CAPSULE ORAL 2 TIMES DAILY PRN
Status: DISCONTINUED | OUTPATIENT
Start: 2020-08-21 | End: 2020-08-24 | Stop reason: HOSPADM

## 2020-08-21 RX ORDER — POTASSIUM CHLORIDE 1.5 G/1.58G
20-40 POWDER, FOR SOLUTION ORAL
Status: DISCONTINUED | OUTPATIENT
Start: 2020-08-21 | End: 2020-08-24 | Stop reason: HOSPADM

## 2020-08-21 RX ORDER — GUAR GUM
1 PACKET (EA) ORAL DAILY
Status: DISCONTINUED | OUTPATIENT
Start: 2020-08-21 | End: 2020-08-24 | Stop reason: HOSPADM

## 2020-08-21 RX ORDER — METOCLOPRAMIDE HYDROCHLORIDE 5 MG/5ML
5 SOLUTION ORAL 2 TIMES DAILY
Status: DISCONTINUED | OUTPATIENT
Start: 2020-08-21 | End: 2020-08-24 | Stop reason: HOSPADM

## 2020-08-21 RX ORDER — ACETAMINOPHEN 650 MG/1
650 SUPPOSITORY RECTAL EVERY 4 HOURS PRN
Status: DISCONTINUED | OUTPATIENT
Start: 2020-08-21 | End: 2020-08-24 | Stop reason: HOSPADM

## 2020-08-21 RX ORDER — PROCHLORPERAZINE MALEATE 5 MG
10 TABLET ORAL EVERY 6 HOURS PRN
Status: DISCONTINUED | OUTPATIENT
Start: 2020-08-21 | End: 2020-08-24 | Stop reason: HOSPADM

## 2020-08-21 RX ORDER — POTASSIUM CHLORIDE 1500 MG/1
20-40 TABLET, EXTENDED RELEASE ORAL
Status: DISCONTINUED | OUTPATIENT
Start: 2020-08-21 | End: 2020-08-24 | Stop reason: HOSPADM

## 2020-08-21 RX ORDER — CHOLECALCIFEROL (VITAMIN D3) 50 MCG
50 TABLET ORAL DAILY
Status: DISCONTINUED | OUTPATIENT
Start: 2020-08-21 | End: 2020-08-24 | Stop reason: HOSPADM

## 2020-08-21 RX ORDER — SODIUM CHLORIDE 9 MG/ML
INJECTION, SOLUTION INTRAVENOUS CONTINUOUS
Status: DISCONTINUED | OUTPATIENT
Start: 2020-08-21 | End: 2020-08-21

## 2020-08-21 RX ORDER — DEXTROSE MONOHYDRATE 50 MG/ML
INJECTION, SOLUTION INTRAVENOUS CONTINUOUS
Status: DISCONTINUED | OUTPATIENT
Start: 2020-08-21 | End: 2020-08-21

## 2020-08-21 RX ORDER — ALBUTEROL SULFATE 90 UG/1
2 AEROSOL, METERED RESPIRATORY (INHALATION) EVERY 4 HOURS PRN
Status: DISCONTINUED | OUTPATIENT
Start: 2020-08-21 | End: 2020-08-24 | Stop reason: HOSPADM

## 2020-08-21 RX ORDER — PROCHLORPERAZINE 25 MG
25 SUPPOSITORY, RECTAL RECTAL EVERY 12 HOURS PRN
Status: DISCONTINUED | OUTPATIENT
Start: 2020-08-21 | End: 2020-08-24 | Stop reason: HOSPADM

## 2020-08-21 RX ORDER — LEVOTHYROXINE SODIUM 150 UG/1
150 TABLET ORAL EVERY MORNING
Status: DISCONTINUED | OUTPATIENT
Start: 2020-08-21 | End: 2020-08-24 | Stop reason: HOSPADM

## 2020-08-21 RX ORDER — ONDANSETRON 2 MG/ML
4 INJECTION INTRAMUSCULAR; INTRAVENOUS EVERY 6 HOURS PRN
Status: DISCONTINUED | OUTPATIENT
Start: 2020-08-21 | End: 2020-08-24 | Stop reason: HOSPADM

## 2020-08-21 RX ORDER — NITROGLYCERIN 0.4 MG/1
0.4 TABLET SUBLINGUAL EVERY 5 MIN PRN
Status: DISCONTINUED | OUTPATIENT
Start: 2020-08-21 | End: 2020-08-21

## 2020-08-21 RX ORDER — HYDROCORTISONE 10 MG/1
10 TABLET ORAL
Status: DISCONTINUED | OUTPATIENT
Start: 2020-08-21 | End: 2020-08-24 | Stop reason: HOSPADM

## 2020-08-21 RX ORDER — POLYETHYLENE GLYCOL 3350 17 G/17G
17 POWDER, FOR SOLUTION ORAL DAILY PRN
Status: DISCONTINUED | OUTPATIENT
Start: 2020-08-21 | End: 2020-08-24 | Stop reason: HOSPADM

## 2020-08-21 RX ORDER — CARBAMAZEPINE 100 MG/5ML
150 SUSPENSION ORAL EVERY 6 HOURS
Status: DISCONTINUED | OUTPATIENT
Start: 2020-08-21 | End: 2020-08-24 | Stop reason: HOSPADM

## 2020-08-21 RX ADMIN — PIPERACILLIN SODIUM AND TAZOBACTAM SODIUM 4.5 G: 4; .5 INJECTION, POWDER, LYOPHILIZED, FOR SOLUTION INTRAVENOUS at 02:20

## 2020-08-21 RX ADMIN — HYDROCORTISONE SODIUM SUCCINATE 50 MG: 100 INJECTION, POWDER, FOR SOLUTION INTRAMUSCULAR; INTRAVENOUS at 02:25

## 2020-08-21 RX ADMIN — Medication 50 MCG: at 14:35

## 2020-08-21 RX ADMIN — OXYCODONE HYDROCHLORIDE AND ACETAMINOPHEN 500 MG: 500 TABLET ORAL at 14:34

## 2020-08-21 RX ADMIN — DEXTROSE MONOHYDRATE: 50 INJECTION, SOLUTION INTRAVENOUS at 19:37

## 2020-08-21 RX ADMIN — SODIUM CHLORIDE, POTASSIUM CHLORIDE, SODIUM LACTATE AND CALCIUM CHLORIDE 500 ML: 600; 310; 30; 20 INJECTION, SOLUTION INTRAVENOUS at 10:04

## 2020-08-21 RX ADMIN — CARBAMAZEPINE 150 MG: 100 SUSPENSION ORAL at 18:45

## 2020-08-21 RX ADMIN — ALBUTEROL SULFATE 2.5 MG: 2.5 SOLUTION RESPIRATORY (INHALATION) at 15:49

## 2020-08-21 RX ADMIN — ACETYLCYSTEINE 2 ML: 200 SOLUTION ORAL; RESPIRATORY (INHALATION) at 19:15

## 2020-08-21 RX ADMIN — POTASSIUM & SODIUM PHOSPHATES POWDER PACK 280-160-250 MG 1 PACKET: 280-160-250 PACK at 16:26

## 2020-08-21 RX ADMIN — HYDROCORTISONE 20 MG: 20 TABLET ORAL at 14:35

## 2020-08-21 RX ADMIN — ACETYLCYSTEINE 2 ML: 200 SOLUTION ORAL; RESPIRATORY (INHALATION) at 07:57

## 2020-08-21 RX ADMIN — HYDROCORTISONE SODIUM SUCCINATE 50 MG: 100 INJECTION, POWDER, FOR SOLUTION INTRAMUSCULAR; INTRAVENOUS at 10:03

## 2020-08-21 RX ADMIN — HYDROCORTISONE 10 MG: 10 TABLET ORAL at 16:26

## 2020-08-21 RX ADMIN — POTASSIUM & SODIUM PHOSPHATES POWDER PACK 280-160-250 MG 1 PACKET: 280-160-250 PACK at 21:57

## 2020-08-21 RX ADMIN — ACETYLCYSTEINE 2 ML: 200 SOLUTION ORAL; RESPIRATORY (INHALATION) at 12:14

## 2020-08-21 RX ADMIN — DEXTROSE MONOHYDRATE: 50 INJECTION, SOLUTION INTRAVENOUS at 12:41

## 2020-08-21 RX ADMIN — PIPERACILLIN SODIUM AND TAZOBACTAM SODIUM 3.38 G: 3; .375 INJECTION, POWDER, LYOPHILIZED, FOR SOLUTION INTRAVENOUS at 19:38

## 2020-08-21 RX ADMIN — SODIUM CHLORIDE 1000 ML: 9 INJECTION, SOLUTION INTRAVENOUS at 02:19

## 2020-08-21 RX ADMIN — ALBUTEROL SULFATE 2.5 MG: 2.5 SOLUTION RESPIRATORY (INHALATION) at 19:15

## 2020-08-21 RX ADMIN — CARBAMAZEPINE 150 MG: 100 SUSPENSION ORAL at 06:10

## 2020-08-21 RX ADMIN — LEVOTHYROXINE SODIUM 150 MCG: 150 TABLET ORAL at 08:26

## 2020-08-21 RX ADMIN — ASPIRIN 81 MG 81 MG: 81 TABLET ORAL at 14:34

## 2020-08-21 RX ADMIN — BRIVARACETAM 100 MG: 10 SOLUTION ORAL at 10:04

## 2020-08-21 RX ADMIN — CARBAMAZEPINE 150 MG: 100 SUSPENSION ORAL at 11:58

## 2020-08-21 RX ADMIN — SODIUM CHLORIDE: 9 INJECTION, SOLUTION INTRAVENOUS at 04:46

## 2020-08-21 RX ADMIN — HYDROCORTISONE SODIUM SUCCINATE 50 MG: 100 INJECTION, POWDER, FOR SOLUTION INTRAMUSCULAR; INTRAVENOUS at 08:25

## 2020-08-21 RX ADMIN — ALBUTEROL SULFATE 2.5 MG: 2.5 SOLUTION RESPIRATORY (INHALATION) at 07:57

## 2020-08-21 RX ADMIN — PIPERACILLIN SODIUM AND TAZOBACTAM SODIUM 3.38 G: 3; .375 INJECTION, POWDER, LYOPHILIZED, FOR SOLUTION INTRAVENOUS at 08:25

## 2020-08-21 RX ADMIN — MINERAL SUPPLEMENT IRON 300 MG / 5 ML STRENGTH LIQUID 100 PER BOX UNFLAVORED 220 MG: at 10:03

## 2020-08-21 RX ADMIN — PANTOPRAZOLE SODIUM 40 MG: 40 TABLET, DELAYED RELEASE ORAL at 08:25

## 2020-08-21 RX ADMIN — PIPERACILLIN SODIUM AND TAZOBACTAM SODIUM 3.38 G: 3; .375 INJECTION, POWDER, LYOPHILIZED, FOR SOLUTION INTRAVENOUS at 14:33

## 2020-08-21 RX ADMIN — ALBUTEROL SULFATE 2.5 MG: 2.5 SOLUTION RESPIRATORY (INHALATION) at 12:14

## 2020-08-21 RX ADMIN — METOCLOPRAMIDE 5 MG: 5 SOLUTION ORAL at 14:44

## 2020-08-21 RX ADMIN — BRIVARACETAM 100 MG: 10 SOLUTION ORAL at 22:06

## 2020-08-21 RX ADMIN — METOCLOPRAMIDE 5 MG: 5 SOLUTION ORAL at 21:57

## 2020-08-21 RX ADMIN — ACETYLCYSTEINE 2 ML: 200 SOLUTION ORAL; RESPIRATORY (INHALATION) at 15:49

## 2020-08-21 RX ADMIN — SODIUM CHLORIDE, POTASSIUM CHLORIDE, SODIUM LACTATE AND CALCIUM CHLORIDE: 600; 310; 30; 20 INJECTION, SOLUTION INTRAVENOUS at 10:04

## 2020-08-21 ASSESSMENT — ACTIVITIES OF DAILY LIVING (ADL)
ADLS_ACUITY_SCORE: 40
ADLS_ACUITY_SCORE: 42

## 2020-08-21 NOTE — PROGRESS NOTES
[facility]    Hospitalist Progress Note    Assessment & Plan       Sepsis due to recurrent aspiration pneumonia versus pneumonitis.   -As mentioned above, he had more than 20 admissions for a similar presentation for aspiration pneumonia in 2019 and it seems that he had 7 admissions in 2020  as well.   - He is having these frequent readmissions despite a PEG and tube feedings and aspiration precautions.  However, he recovers quickly after admission, suggesting possible pneumonitis rather than pneumonia.  -  He had been evaluated by ID in the past.  -  This admission, he presented again with fever and altered mental status after a couple of episodes of emesis at home.   -Temperature in ER was 101.4.  His blood pressure was on lower side with the lowest 81/50.  He was tachycardic on admission.  Clinically, he looked dry.  His white blood cells are elevated at 17.1.  Lactic acid is normal.   - He received 1 dose of Zosyn in ER along with IV hydrocortisone 50 mg IV.  His blood pressure improved.  He is admitted to [facility] for now.   -central line placed.   -using thigh cuff. sbp low 100s/-. Upper arm 80s/-  -more alert this am. Gave thumbs up to RN. Making urine.   -lactate remains nl range and improved.   - warm extrem. Good pulses    Plan:  -ICU for now   -IV Zosyn.   -follow for need of arterial line  -continue with IV fluids, change to LR given less Na  - Tylenol   -continue with stress doses of Solu-Cortef 50 mg IV 6 hours given his history of panhypopituitarism. Give extra dose of solucortef 50mg IV X1 now  - Aspiration precautions in place.   - Zofran, Compazine p.r.n. had been ordered.   -keep him n.p.o. for now. But likely start TFs this afternoon if doing well.   - Eventually nutrition consult needs to be requested to resume his tube feedings.  -  We will resume his prior to admission Mucomyst and albuterol nebulization 4 times daily.  -  Incentive spirometry and Acapella had been ordered.  Also,  it might be difficult for him to perform this.     Addendum;1630.  pt doing well. Normotensive. Back to baseline mental status. Alert and interactive but nonverbal. Transfer to floor without IMC.       Hyponatremia. Hypovolemic from dehydration  -very likely fairly acute in last 2-3 days likely per onset of sxs starting ~8/19  -134 8/15 last admission day of discharge  - His sodium is 120.  suspect volume down.   -1L NS in ED  -started on normal saline at 100 mL per hour overnight.  Check NA now  -Once his tube feedings will be resumed, free water flushes should be adjusted as well.  -q4 hours Na for now, change main fluids to LR to slow rate of Na correction following discussion with intensivist. Dr. Sesay. LR bolus given possible hypotension this am and still need fluid rescecitation. Follow Na q4 hours for now, LR with less sodium and expect Na to plataue.    Addendum; pt doing well. Na up to 133 on LR and NS for fluid rececitation. Was hypotensive this am.  Hypovolemic hyponatremia given rapid correction with fluids  Nl sbp now and off oxygen  Start TFs  Stop LR and start D5W 100cc/hr and q 4 hours Na to slow rate of correction today then adjust fluids accordingly.   Start TFs with free water flushes  Stop stress dose steroids. Restart PtA solucortef  Discussed with NANCI Alexandre at 1500.    Addendum;1600, Na up to 136 on D5W for last 2-3 hours. TFs to be started with free water flushes 200cc down FT q4 hours while getting TFs from 8 am to 8 pm (will extend to midnight since TF to start around 1700 today). Had about 400cc free water today  Discussed with intensivist service again given rise in Na 120-136 today. rec'd free water at 50cc/hr and follow q1lxins given relatively acute drop in Na likely  Discussed with nephrology regarding Na correction since midnight today. Recommend getting sodium down to 128-130 range today then stable at this range overnight until tomorrow am. Can address in am. Therefore,  increased D5W  to 200cc/hr with goal Na of ~128-130 overnight- adjust rate up/down as needed per Na. Pt was on D5W 100/hr since 12:30 today. His free water flushes 200cc q4 hours to start now while on TFs until midnight- may need to continue free water flushes overnight to achieve serum Na goal.  Adjust D5W accordingly. No HF issues. No CKD. Follow serum Na q4 hours overnight. Call results to on call hospitalist. Discussed with RN icu and floor and on call hospitalist.      History of hypopituitarism.   - He had been on hydrocortisone 20 mg every morning and 10 mg at dinnertime.   - He is also on levothyroxine 150 mcg p.o. daily and testosterone injection every 2 weeks.     - Plan for now is to resume his prior to admission levothyroxine.    - holding his oral hydrocortisone while he was getting stress doses of steroids.     Recurrent N/V  -has not occurred in hospital on prior admissions   CT abd 8/10 unremarkeable. Nl lipase and LFs  Seen by [facility] GI service 8/12/20, recommend aspiration precuations, daily PPI,  recommend to c/w J tube feeding and open up the GI tube with either intermittent suction or gravity to empty up gastric content; given this is a G-J tube it will provide the best decompression of the stomach  - pt's mother not leaving G port open to gravity or suction. Will rediscuss with GI tomorrow.       History of TBI, spastic hemiplegia, severe dysphagia:       History of seizure disorder.   - Continue his prior to admission Tegretol and brivaracetam.        History of pancreatic cyst noted on a prior CT of the abdomen that apparently remained stable on the last CT on 08/13/2020.       Deep venous thrombosis prophylaxis.  Pneumatic compression device.       COVID-19 status.  He was tested as symptomatic the patient for COVID-19.  Although I do not suspect COVID-19 infection at this time given his very similar presentation with his prior to admission, so if the test returns back negative, would discontinue special  precautions.       CODE STATUS:  I was not able to discuss code status with the patient.  His mother was not present at the time of my examination.  I have ordered a full code for now, as per his prior orders. Know to be full code, pt know to me from recent admission.      DISPOSITION: ICU.    Admit inpatient.  I anticipate a couple of days of hospitalization.          Alvaro Barahona MD  Text Page  (7am to 6pm)  Interval History   Reduced oxygen needs this am  Fluid bolus in ed  Na 120   Nonverbal at baseline      -Data reviewed today: I reviewed all new labs and imaging results over the last 24 hours. I personally reviewed imaging and labs since admission    Physical Exam   Temp: 100.1  F (37.8  C) Temp src: Oral BP: 105/60 Pulse: 73   Resp: 22 SpO2: 93 % O2 Device: Simple face mask Oxygen Delivery: 4 LPM  Vitals:    08/21/20 0400   Weight: 69.4 kg (153 lb)     Vital Signs with Ranges  Temp:  [100.1  F (37.8  C)-101.4  F (38.6  C)] 100.1  F (37.8  C)  Pulse:  [] 73  Resp:  [10-28] 22  BP: ()/(50-63) 105/60  SpO2:  [93 %-100 %] 93 %  I/O last 3 completed shifts:  In: 123.33 [I.V.:123.33]  Out: 725 [Urine:725]    Constitutional: Awake, nad  Respiratory: Breathing easily, now off oxygen, difficult exam given poor effort. Possible crackles bibasilar  Cardiovascular: RRR no r/g/m. Not tachy  GI: peg tube looks fine.   R groin central line looks fine  Soft, nt, nt  Skin/Integumen: no rash or cyanosis.   Neuro: resists turning or eye exam, gave thumbs up to RN earlier. Near baseline cognitive impairment, Not typically move R arm      Medications     sodium chloride 100 mL/hr at 08/21/20 0446       acetylcysteine  2 mL Nebulization 4x Daily     albuterol  2.5 mg Nebulization Q4H While awake     Brivaracetam  100 mg Oral or Feeding Tube BID     carBAMazepine  150 mg Oral or Feeding Tube Q6H     ferrous sulfate  220 mg Oral or Feeding Tube Daily     hydrocortisone sodium succinate PF  50 mg Intravenous Q6H      levothyroxine  150 mcg Oral QAM     pantoprazole  40 mg Per J Tube Daily     piperacillin-tazobactam  3.375 g Intravenous Q6H     sodium chloride (PF)  3 mL Intracatheter Q8H       Data   Recent Labs   Lab 08/21/20  0007 08/15/20  0928   WBC 17.1*  --    HGB 12.1*  --    MCV 86  --      --    * 134   POTASSIUM 4.7 3.6   CHLORIDE 89* 99   CO2 25 29   BUN 15 17   CR 0.76 0.97   ANIONGAP 6 6   STEVE 7.8* 7.9*   GLC 93 88   ALBUMIN 2.7*  --    PROTTOTAL 8.5  --    BILITOTAL 0.3  --    ALKPHOS 131  --    ALT 59  --    AST 51*  --    TROPI <0.015  --        Imaging:   Recent Results (from the past 24 hour(s))   XR Chest Port 1 View    Narrative    EXAM: CHEST SINGLE VIEW PORTABLE  LOCATION: [facility]  DATE/TIME: 8/21/2020 2:23 AM    INDICATION: Fever.  COMPARISON: 08/10/2020.    FINDINGS: A small patchy opacity is present in the right lung base. The lungs are otherwise clear. Normal size cardiac silhouette. Mild elevation right hemidiaphragm.      Impression    IMPRESSION:   1. A small patchy opacity in the right lung base could represent pneumonia or atelectasis.  2. No other findings suspicious for active cardiopulmonary disease.

## 2020-08-21 NOTE — PLAN OF CARE
RN. Vitals stable, afebrile. No c/o pain. Pt now alert and moving all extremitites. Tube feedings to restart. Condom cath in place, large amount of urine this shift. Covid negiative. Updated mother via phone, transferred to [facility] on cart, all belongings sent with patient.

## 2020-08-21 NOTE — ED PROVIDER NOTES
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 His caregiver is not present with him.    HPI  Keyon Farias is a 60 year old male with a history of significant recurrent aspiration pneumonia, cerebral artery occlusion with cerebral infarction, UTI, asthma, and cardiac arrest who presents for evaluation of fever. Per chart review the patient has presented on 4 occasions in this calendar year for sepsis, intractable epilepsy, febrile illnesses, and pneumonia, with discharge on 08/15. The patient was last admitted on 08/10 for aspiration pneumonia and bacterial sepsis. He presented after raspy breathing with rales and fever with T max of 103.4. These are his classic symptoms as referenced through chart review of recent visits.     Tonight, he presents after his mother (caregiver) found his temperature to be 103 around 2200.  Patient's mother reports that he was okay this morning and tolerated breakfast but throughout the day seems to be more somnolent.  She had some phlegm in his throat this afternoon and gave him a dose of scopolamine.  She also notes 2 episodes of vomiting yesterday and 2 episodes of vomiting today.  No blood in the vomit.  Then this evening around 1030 she noted that he was a bit altered and found him to be febrile to 103.  Also noted him to be breathing a little bit more quickly.    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  Deep gabe thrombosis  GERD  Panhypopituitarism  Pneumonia  Seizures  Septic shock  Spastic hemiplegia affecting dominant side  Hypothyroidism   UTI  Cerebral artery occlusion with cerebral infarction  Ventricular fibrillation  Ventricular tachyarrhythmia   Cardiac arrest  Necrotizing pancreatitis   Systemic inflammatory response  syndrome   Asthma  Peptic disease      Past Surgical History:    EGD x 5  Reconstructive facial surgery   Gastro jejunostomy tube change x 7  PICC exchange left  Appendectomy  Laparoscopic assisted insertion tube gastrotomy   Orthopedic surgery, right hand repair  Tracheostomy x 2   Vascular surgery   Contracture release, right arm and ankle     Family History:    Father - Cancer      Social History:  The patient was accompanied to the ED by EMS.  Smoking Status: Former, 1989  Smokeless tobacco: Never Used  Alcohol Use: No  Drug Use: No  PCP:  Carlos Gomez   Marital Status:  Single [1]       Review of Systems   Unable to perform ROS: Mental status change       Physical Exam     Patient Vitals for the past 24 hrs:   BP Pulse Resp SpO2   08/20/20 2321 105/58 102 12 97 %      Physical Exam  General: Patient is awake but nonverbal. Will interact with a thumbs up on the left hand.   Head: The scalp, face, and head appear normal  Eyes: The pupils are equal, round, and reactive to light. Conjunctivae and sclerae are normal  Neck: Normal range of motion. No anterior cervical lymphadenopathy noted  CV: Regular rate. S1/S2. No murmurs.   Resp: Lungs are clear without wheezes or rales. No respiratory distress. Tachypnea.   GI: Abdomen is soft, no rigidity, guarding, or rebound. No distension. No tenderness to palpation in any quadrant. GJ tube in place, no surrounding erythema or edema.    MS: Normal tone. Joints grossly normal without effusions. No asymmetric leg swelling, calf or thigh tenderness.    Skin: No rash or lesions noted. Normal capillary refill noted. Sun burn over bilateral arms.   Neuro: Nonverbal but awake, appears aware of surroundings. Face is symmetric. Flaccid paralysis. Moves left upper and lower extremity     Emergency Department Course     ECG:  ECG taken at 2346  Sinus tachycardia  Left axis deviation  Abnormal ECG  No significant change since prior EKG.   Rate 107 bpm. WY interval 154 ms. QRS  duration 88 ms. QT/QTc 348/464 ms. P-R-T axes 49 -40 42.    Imaging:  Radiology results were communicated with the patient who voiced understanding of the findings.    XR Chest Port 1 View  1. A small patchy opacity in the right lung base could represent pneumonia or atelectasis.  2. No other findings suspicious for active cardiopulmonary disease.     Reading per radiology     Laboratory:  Laboratory findings were communicated with the patient who voiced understanding of the findings.    Blood Gas venous and OHgb: pH 7.47 (H), PCO2 36 (L), PO2 35, Bicarbonate 26, OHgb 70   Troponin: (Collected 0007) <0.015   Lactic Acid: 1.4  Procalcitonin: 0.08    CBC: WBC 17.1 (H), HGB 12.1 (L) ,   BMP: Chloride 89 (L) Na 120 (L) Calcium 7.8 (L)  (Creatinine 0.76) o/w WNL   Hepatic: Albumin 2.7 (L)     Procedure:     Central Line Placement     Procedure:  Central Line Placement with Ultrasound Guidance.    Indications: Vascular access and Sepsis (monitoring of mixed venous oxygen saturations)    Consent:  Emergent consent.     Timeout:  Universal protocol was followed. TIME OUT conducted just prior to starting procedure confirmed patient identity, site/side, procedure, patient position, and availability of correct equipment and implants.?  Yes    Procedure note:  Left Femoral and left femoral area was prepped, failed, then right femoral area prepped and prepared, cleansed and draped in a sterile fashion.  Mask, gown and gloves were used per sterile protocol.  Patient was then placed into Trendelenburg position and lidocaine was used for local anesthesia.  Landmarks were identified.  Introducer needle was then used to gain access to the central venous circulation.  Using Seldinger technique the  Triple lumen catheter was placed.  Catheter port(s) were aspirated and flushed.  Central line was sutured in place and sterile dressing applied.   Appropriate placement was confirmed by x-ray and no pneumothorax was  visualized.    Patient Status:  Patient tolerated the procedure 2320.  There were 1 failed attempts.        Interventions:   0219 NS 1000 mL IV   0219 Zosyn 4.5 g IV  0225 Solu-Cortef 50 mg IV     Emergency Department Course:    2320 Nursing notes and vitals reviewed.    2330 I performed an exam of the patient as documented above.     2342 I spoke with the patient's mother, who is his care taker.     0007 IV was inserted and blood was drawn for laboratory testing, results above.    0110 Patient rechecked and updated.      0125 I performed the central line procedure as documented above.     0224 The patient was sent for XR while in the emergency department, results above.      I consulted with Dr. Maldonado  of the hospitalist services. They are in agreement to accept the patient for admission. Findings and plan explained to the mother who consents to admission. Discussed the patient with Dr. Maldonado, who will admit the patient to an ICU bed for further monitoring, evaluation, and treatment.    Impression & Plan     Medical Decision Making:  Patient is a 60-year-old gentleman who is well-known to this hospital with past medical history of seizures, panhypopituitary is him, recurrent aspiration pneumonia, hyponatremia, flaccid paralysis, aphasia due to TBI who presents to the emergency department today with altered mental status and fever.  Upon initial evaluation patient is febrile but is not tachycardic but does have some soft pressures.  He is tachypneic and looks like he has a little bit increased work of breathing.  After broad work-up it appears that the patient likely has a another episode of aspiration pneumonia with a opacity found on his chest x-ray in the right lower lobe.  Antibiotics were started in the emergency department. He did have some soft blood pressures and unfortunately access was an issue with therefore central line was placed in the right groin.  Following stress dose steroids and fluids patient's  blood pressure responded well.  Unfortunately the patient also has an acute on chronic exacerbation of his hyponatremia.  It is unclear whether this is from medication noncompliance, dehydration or excessive free water.  In any case due to the patient's significant hyponatremia, hypotension and altered mental status he will require admission to the ICU for further evaluation and treatment.    Critical Care time was 60 minutes for this patient excluding procedures.     Diagnosis:     ICD-10-CM    1. Hyponatremia  E87.1 Blood culture     Blood culture     Symptomatic COVID-19 Virus (Coronavirus) by PCR   2. Aspiration pneumonia, unspecified aspiration pneumonia type, unspecified laterality, unspecified part of lung (H)  J69.0    3. Fever, unspecified fever cause  R50.9         Disposition:  Admitted to Dr. Maldonado.      Scribe Disclosure:  I, Juan Manuel Jin, am serving as a scribe at 11:25 PM on 8/20/2020 to document services personally performed by Edgar Alexander MD  based on my observations and the provider's statements to me.          Edgar Alexander MD  08/21/20 0645

## 2020-08-21 NOTE — ED TRIAGE NOTES
"Pt was here today and dx with pneumonia.  Mom who is caretaker took temp tonight around 2200 and temp was 103.  Mother and caretaker do not \"believe in tylenol.\"  "
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
## 2020-08-21 NOTE — ED NOTES
Bed: ED25  Expected date:   Expected time:   Means of arrival:   Comments:  [facility] moving back here

## 2020-08-21 NOTE — PLAN OF CARE
Pt admitted from ED to ICU at 0400. Pt does not follow commands and is lethargic. Moves all extremities, PERRLA. NSR, SBP 100s-110s. Placed on 4L via face mask this am d/t O2 stat dropping to mid 80s. LS coarse. Low grade fever noted upon admission. Pt had med soft BM, condom cath placed upon arrival to unit. Wound noted to cocccyx, foam dressing applied.     Elly Back RN

## 2020-08-21 NOTE — PHARMACY-ADMISSION MEDICATION HISTORY
Pharmacy Medication History  Admission medication history interview status for the 8/20/2020  admission is complete. See EPIC admission navigator for prior to admission medications     Medication history sources: Patient's family/friend (Mom - Savannah)  Medication history source reliability: Moderate  Adherence assessment: Moderate    Significant changes made to the medication list:  None      Additional medication history information:   Called momSavannah, who is patient's caregiver. Savannah was slow to answer questions and seemed familiar with patient's medications but couldn't answer quickly/definitively. Mom was unsure of when he last received his testosterone.     Patient was prescribed Augmentin 400-57mg/5mL (10mL BID for 2 days) that should have been completed a few days ago.     Patient's medication list is the same as the discharge list per mom.     Medication reconciliation completed by provider prior to medication history? Yes    Time spent in this activity: 15 minutes      Prior to Admission medications    Medication Sig Last Dose Taking? Auth Provider   acetylcysteine (MUCOMYST) 20 % neb solution Take 2 mLs by nebulization 4 times daily With albuterol at 0700, 1100, 1500, and 1900  8/20/2020 at 2100 Yes Unknown, Entered By History   albuterol (PROVENTIL) (5 MG/ML) 0.5% neb solution Take 2.5 mg by nebulization every 4 hours (while awake) 0700 1100 1500 1900 with mucomyst  8/20/2020 at 2100 Yes Unknown, Entered By History   aspirin (ASA) 81 MG chewable tablet 81 mg by Oral or Feeding Tube route daily At 0900 8/20/2020 at Unknown time Yes Unknown, Entered By History   Brivaracetam (BRIVIACT) 10 MG/ML solution 100 mg by Oral or Feeding Tube route 2 times daily 0900, 2100 8/20/2020 at 2100 Yes Unknown, Entered By History   calcium carbonate 1250 (500 CA) MG/5ML SUSP suspension 5 mLs (1,250 mg) by Per J Tube route 3 times daily (with meals) 8/20/2020 at 2100 Yes Mariana Venegas MD   carBAMazepine  (TEGRETOL) 100 MG/5ML suspension 150 mg by Oral or Feeding Tube route every 6 hours At 06:00, 12:00, 18:00 and 24:00 for seizures  8/20/2020 at 1800 Yes Unknown, Entered By History   ferrous sulfate 220 (44 Fe) MG/5ML ELIX 220 mg by Per Feeding Tube route daily 8/20/2020 at Unknown time Yes Unknown, Entered By History   Guar Gum (FIBER MODULAR, NUTRISOURCE FIBER,) packet 1 packet by Per G Tube route daily 8/20/2020 at 0900 Yes Starr Champion MD   hydrocortisone (CORTEF) 5 MG tablet 10 mg by Oral or FT or NG tube route daily (with dinner) At 1500 8/20/2020 at PM Yes Unknown, Entered By History   hydrocortisone (CORTEF) 5 MG tablet 20 mg by Oral or FT or NG tube route every morning  8/20/2020 at 0900 Yes Unknown, Entered By History   levothyroxine (SYNTHROID/LEVOTHROID) 150 MCG tablet Take 150 mcg by mouth every morning 8/20/2020 at AM Yes Unknown, Entered By History   melatonin (MELATONIN) 1 MG/ML LIQD liquid 6 mg by Per NG tube route At Bedtime  8/20/2020 at HS Yes Unknown, Entered By History   metoclopramide (REGLAN) 5 MG/5ML solution 5 mg by Per Feeding Tube route 2 times daily 8/20/2020 at 2100 Yes Unknown, Entered By History   Multiple Vitamins-Minerals (EMERGEN-C VITAMIN C) PACK 1 packet by Oral or Feeding Tube route daily Vitamin C 1000 mg 8/20/2020 at Unknown time Yes Unknown, Entered By History   pantoprazole (PROTONIX) 2 mg/mL SUSP suspension 20 mLs (40 mg) by Per J Tube route daily 8/20/2020 at 0900 Yes Washington Connors MD   potassium & sodium phosphates (NEUTRA-PHOS) 280-160-250 MG Packet Take 1 packet by mouth 3 times daily  8/20/2020 at Unknown time Yes Yanely Liriano MD   Scopolamine HBr POWD Dispense #90. Mix contents with small amount of water for admin via J-tube.  Administer 0.8 mg three times each day. 8/20/2020 at PM Yes Jennie Bermudez MD   sodium bicarbonate 650 MG tablet Take 1 tablet (650 mg) by mouth daily 8/20/2020 at Unknown time Yes Ricky Torres MD   vitamin D3  (CHOLECALCIFEROL) 2000 units (50 mcg) tablet Take 2,000 Units by mouth daily Crush and feed via j-tube @@ 0900 8/20/2020 at Unknown time Yes Unknown, Entered By History   bacitracin ointment Apply topically daily as needed for wound care To PEG site.   at PRN  Unknown, Entered By History   hydrocortisone 1 % CREA cream Place rectally 2 times daily as needed for other Apply to reddened memo areas as needed  at PRN  Unknown, Entered By History   miconazole (MICATIN) 2 % AERP powder Apply topically 2 times daily as needed   at PRN  Unknown, Entered By History   mupirocin (BACTROBAN) 2 % external ointment Apply topically 2 times daily as needed   at PRN  Reported, Patient   [facility] for DME [facility]   Skin Protectants, Misc. (BALMEX SKIN PROTECTANT) OINT Externally apply topically 2 times daily as needed (irritation) Applay to reddened memo areas twice daily as needed  at PRN  Unknown, Entered By History   testosterone cypionate (DEPOTESTOTERONE CYPIONATE) 200 MG/ML injection Inject 76 mg into the muscle See Admin Instructions Every 2 weeks on Thursdays  76 mg or 0.38 mL Unknown at Unknown time  Unknown, Entered By History

## 2020-08-22 LAB
ANION GAP SERPL CALCULATED.3IONS-SCNC: 6 MMOL/L (ref 3–14)
BUN SERPL-MCNC: 13 MG/DL (ref 7–30)
CALCIUM SERPL-MCNC: 8 MG/DL (ref 8.5–10.1)
CHLORIDE SERPL-SCNC: 106 MMOL/L (ref 94–109)
CO2 SERPL-SCNC: 26 MMOL/L (ref 20–32)
CREAT SERPL-MCNC: 0.81 MG/DL (ref 0.66–1.25)
ERYTHROCYTE [DISTWIDTH] IN BLOOD BY AUTOMATED COUNT: 13.9 % (ref 10–15)
GFR SERPL CREATININE-BSD FRML MDRD: >90 ML/MIN/{1.73_M2}
GLUCOSE BLDC GLUCOMTR-MCNC: 135 MG/DL (ref 70–99)
GLUCOSE BLDC GLUCOMTR-MCNC: 154 MG/DL (ref 70–99)
GLUCOSE BLDC GLUCOMTR-MCNC: 160 MG/DL (ref 70–99)
GLUCOSE BLDC GLUCOMTR-MCNC: 162 MG/DL (ref 70–99)
GLUCOSE BLDC GLUCOMTR-MCNC: 206 MG/DL (ref 70–99)
GLUCOSE SERPL-MCNC: 131 MG/DL (ref 70–99)
HCT VFR BLD AUTO: 32.4 % (ref 40–53)
HGB BLD-MCNC: 10.6 G/DL (ref 13.3–17.7)
MCH RBC QN AUTO: 28.8 PG (ref 26.5–33)
MCHC RBC AUTO-ENTMCNC: 32.7 G/DL (ref 31.5–36.5)
MCV RBC AUTO: 88 FL (ref 78–100)
PLATELET # BLD AUTO: 316 10E9/L (ref 150–450)
POTASSIUM SERPL-SCNC: 3.6 MMOL/L (ref 3.4–5.3)
RBC # BLD AUTO: 3.68 10E12/L (ref 4.4–5.9)
SODIUM SERPL-SCNC: 138 MMOL/L (ref 133–144)
SODIUM SERPL-SCNC: 139 MMOL/L (ref 133–144)
SODIUM SERPL-SCNC: 139 MMOL/L (ref 133–144)
WBC # BLD AUTO: 12.2 10E9/L (ref 4–11)

## 2020-08-22 PROCEDURE — 94640 AIRWAY INHALATION TREATMENT: CPT | Mod: 76

## 2020-08-22 PROCEDURE — 40000275 ZZH STATISTIC RCP TIME EA 10 MIN

## 2020-08-22 PROCEDURE — 85027 COMPLETE CBC AUTOMATED: CPT | Performed by: INTERNAL MEDICINE

## 2020-08-22 PROCEDURE — 25000132 ZZH RX MED GY IP 250 OP 250 PS 637: Mod: GY | Performed by: INTERNAL MEDICINE

## 2020-08-22 PROCEDURE — 84295 ASSAY OF SERUM SODIUM: CPT | Performed by: INTERNAL MEDICINE

## 2020-08-22 PROCEDURE — 36415 COLL VENOUS BLD VENIPUNCTURE: CPT | Performed by: INTERNAL MEDICINE

## 2020-08-22 PROCEDURE — 80048 BASIC METABOLIC PNL TOTAL CA: CPT | Performed by: INTERNAL MEDICINE

## 2020-08-22 PROCEDURE — 00000146 ZZHCL STATISTIC GLUCOSE BY METER IP

## 2020-08-22 PROCEDURE — 12000000 ZZH R&B MED SURG/OB

## 2020-08-22 PROCEDURE — 25000128 H RX IP 250 OP 636: Performed by: INTERNAL MEDICINE

## 2020-08-22 PROCEDURE — 25000125 ZZHC RX 250: Performed by: INTERNAL MEDICINE

## 2020-08-22 PROCEDURE — 99233 SBSQ HOSP IP/OBS HIGH 50: CPT | Performed by: INTERNAL MEDICINE

## 2020-08-22 PROCEDURE — 94640 AIRWAY INHALATION TREATMENT: CPT

## 2020-08-22 RX ORDER — NICOTINE POLACRILEX 4 MG
15-30 LOZENGE BUCCAL
Status: DISCONTINUED | OUTPATIENT
Start: 2020-08-22 | End: 2020-08-24 | Stop reason: HOSPADM

## 2020-08-22 RX ORDER — POTASSIUM CHLORIDE 7.45 MG/ML
10 INJECTION INTRAVENOUS ONCE
Status: COMPLETED | OUTPATIENT
Start: 2020-08-22 | End: 2020-08-22

## 2020-08-22 RX ORDER — DEXTROSE MONOHYDRATE 25 G/50ML
25-50 INJECTION, SOLUTION INTRAVENOUS
Status: DISCONTINUED | OUTPATIENT
Start: 2020-08-22 | End: 2020-08-24 | Stop reason: HOSPADM

## 2020-08-22 RX ADMIN — Medication 50 MCG: at 08:23

## 2020-08-22 RX ADMIN — POTASSIUM & SODIUM PHOSPHATES POWDER PACK 280-160-250 MG 1 PACKET: 280-160-250 PACK at 18:29

## 2020-08-22 RX ADMIN — PIPERACILLIN SODIUM AND TAZOBACTAM SODIUM 3.38 G: 3; .375 INJECTION, POWDER, LYOPHILIZED, FOR SOLUTION INTRAVENOUS at 14:19

## 2020-08-22 RX ADMIN — ALBUTEROL SULFATE 2.5 MG: 2.5 SOLUTION RESPIRATORY (INHALATION) at 15:14

## 2020-08-22 RX ADMIN — METOCLOPRAMIDE 5 MG: 5 SOLUTION ORAL at 08:22

## 2020-08-22 RX ADMIN — BRIVARACETAM 100 MG: 10 SOLUTION ORAL at 21:16

## 2020-08-22 RX ADMIN — POTASSIUM CHLORIDE 10 MEQ: 7.46 INJECTION, SOLUTION INTRAVENOUS at 12:20

## 2020-08-22 RX ADMIN — PIPERACILLIN SODIUM AND TAZOBACTAM SODIUM 3.38 G: 3; .375 INJECTION, POWDER, LYOPHILIZED, FOR SOLUTION INTRAVENOUS at 02:44

## 2020-08-22 RX ADMIN — CARBAMAZEPINE 150 MG: 100 SUSPENSION ORAL at 12:13

## 2020-08-22 RX ADMIN — ASPIRIN 81 MG 81 MG: 81 TABLET ORAL at 08:23

## 2020-08-22 RX ADMIN — PIPERACILLIN SODIUM AND TAZOBACTAM SODIUM 3.38 G: 3; .375 INJECTION, POWDER, LYOPHILIZED, FOR SOLUTION INTRAVENOUS at 20:45

## 2020-08-22 RX ADMIN — ACETYLCYSTEINE 2 ML: 200 SOLUTION ORAL; RESPIRATORY (INHALATION) at 07:33

## 2020-08-22 RX ADMIN — METOCLOPRAMIDE 5 MG: 5 SOLUTION ORAL at 21:58

## 2020-08-22 RX ADMIN — PIPERACILLIN SODIUM AND TAZOBACTAM SODIUM 3.38 G: 3; .375 INJECTION, POWDER, LYOPHILIZED, FOR SOLUTION INTRAVENOUS at 08:16

## 2020-08-22 RX ADMIN — CARBAMAZEPINE 150 MG: 100 SUSPENSION ORAL at 18:28

## 2020-08-22 RX ADMIN — PANTOPRAZOLE SODIUM 40 MG: 40 TABLET, DELAYED RELEASE ORAL at 08:21

## 2020-08-22 RX ADMIN — HYDROCORTISONE 20 MG: 20 TABLET ORAL at 08:23

## 2020-08-22 RX ADMIN — ALBUTEROL SULFATE 2.5 MG: 2.5 SOLUTION RESPIRATORY (INHALATION) at 00:02

## 2020-08-22 RX ADMIN — Medication 1 PACKET: at 12:13

## 2020-08-22 RX ADMIN — POTASSIUM & SODIUM PHOSPHATES POWDER PACK 280-160-250 MG 1 PACKET: 280-160-250 PACK at 08:23

## 2020-08-22 RX ADMIN — ALBUTEROL SULFATE 2.5 MG: 2.5 SOLUTION RESPIRATORY (INHALATION) at 07:33

## 2020-08-22 RX ADMIN — POTASSIUM & SODIUM PHOSPHATES POWDER PACK 280-160-250 MG 1 PACKET: 280-160-250 PACK at 21:58

## 2020-08-22 RX ADMIN — CARBAMAZEPINE 150 MG: 100 SUSPENSION ORAL at 00:23

## 2020-08-22 RX ADMIN — ALBUTEROL SULFATE 2.5 MG: 2.5 SOLUTION RESPIRATORY (INHALATION) at 11:40

## 2020-08-22 RX ADMIN — MINERAL SUPPLEMENT IRON 300 MG / 5 ML STRENGTH LIQUID 100 PER BOX UNFLAVORED 220 MG: at 08:22

## 2020-08-22 RX ADMIN — ACETYLCYSTEINE 2 ML: 200 SOLUTION ORAL; RESPIRATORY (INHALATION) at 11:40

## 2020-08-22 RX ADMIN — BRIVARACETAM 100 MG: 10 SOLUTION ORAL at 09:45

## 2020-08-22 RX ADMIN — ACETYLCYSTEINE 2 ML: 200 SOLUTION ORAL; RESPIRATORY (INHALATION) at 20:27

## 2020-08-22 RX ADMIN — ALBUTEROL SULFATE 2.5 MG: 2.5 SOLUTION RESPIRATORY (INHALATION) at 20:27

## 2020-08-22 RX ADMIN — CARBAMAZEPINE 150 MG: 100 SUSPENSION ORAL at 06:53

## 2020-08-22 RX ADMIN — HYDROCORTISONE 10 MG: 10 TABLET ORAL at 18:29

## 2020-08-22 RX ADMIN — LEVOTHYROXINE SODIUM 150 MCG: 150 TABLET ORAL at 08:23

## 2020-08-22 RX ADMIN — ACETYLCYSTEINE 2 ML: 200 SOLUTION ORAL; RESPIRATORY (INHALATION) at 15:14

## 2020-08-22 RX ADMIN — OXYCODONE HYDROCHLORIDE AND ACETAMINOPHEN 500 MG: 500 TABLET ORAL at 08:23

## 2020-08-22 ASSESSMENT — ACTIVITIES OF DAILY LIVING (ADL)
ADLS_ACUITY_SCORE: 40
ADLS_ACUITY_SCORE: 44
ADLS_ACUITY_SCORE: 44
ADLS_ACUITY_SCORE: 42

## 2020-08-22 NOTE — CONSULTS
Chart reviewed, patient was seen yesterday for a complete assessment - seen note dated 8/21/20 for details.    Asked to clarify flushes in this TF patient.    Current regimen is as follows -->  Type of Feeding Tube: J-tube   Enteral Frequency:  Cyclic   Enteral Regimen: Isosource 1.5 at 100 mL/hr x 12 hours (8am - 8pm)   Total Enteral Provisions: 1800 kcal, 82 g protein (1.1 g/kg and 91% needs), 211 g CHO, 18 g fiber, 912 mL H2O   Free Water Flush: 200 mL every 4 hours + 60 mL before and after each cycle   Also receiving NutriSource Fiber 1 pkt daily = 3 g fiber, 15 kcal   Total = 1815 kcal (24 kcal/kg)   TOTAL FLUID (TF + flushes) = 2232 mL    Confirmed with RN via phone that flushes should be 200 mL every 4 hours (round the clock over 24 hours).    Swati Parrish, RD, LD, CNSC   Clinical Dietitian - [facility]

## 2020-08-22 NOTE — PROGRESS NOTES
On Call    Sodium 120, then corrected to 136 in < 24 hours  Started on D5W at 200 ml/hr IV and after 3 hours Sodium 137.     Suspect kidneys releasing free water -- will reduce IV rate to 100 ml/hr and check BMP in AM.    Reji Connolly MD  Pager: [phone]  Cell Phone:  [phone]

## 2020-08-22 NOTE — PLAN OF CARE
DATE & TIME: 8/222/20 4629-4275            Cognitive Concerns/ Orientation : HECTOR. Nonverbal at baseline   BEHAVIOR & AGGRESSION TOOL COLOR: Green  CIWA SCORE: N/a    ABNL VS/O2: VSS on room air  MOBILITY: Total with lift, assist x 2. Turned and repositioned every 2 hours.  PAIN MANAGMENT: No nonverbal indicators of pain  DIET: NPO.  Jejuneal Tube feeding 8 am to 8 pm with round the clock q4h water flushes.    BOWEL/BLADDER: Incontinent of large soft stool  External catheter in place, good urine output.  ABNL LAB/BG: , 160 (no insulin coverage)  DRAIN/DEVICES: GJ tube, external catheter, PIV saline locked  TELEMETRY RHYTHM: NSR  SKIN: Mepilex to coccyx changed, red blanchable skin.  TESTS/PROCEDURES: N/a  D/C DAY/GOALS/PLACE: A couple of days  OTHER IMPORTANT INFO: Na level monitored, next at 1630.  Lungs sound clear.  Coughs at times, swallows.  Refused 3 attempts at oral cares, pushes nurse away, lips dry.  Harsher cough this afternoon; refuses to have any oral suctioning.  Attempting to keep his head elevated well.  Mom called and was updated, she expressed hope in strategies to prevent recurring aspiration.

## 2020-08-22 NOTE — PLAN OF CARE
DATE & TIME: 8/21/20, 5510 - 0888   Cognitive Concerns/ Orientation : HECTOR. Nonverbal at baseline   BEHAVIOR & AGGRESSION TOOL COLOR: Green  CIWA SCORE: N/a   ABNL VS/O2: VSS on room air  MOBILITY: Total with lift, assist x 2. Turned and repositioned  PAIN MANAGMENT: No nonverbal indicators of pain  DIET: NPO. Tube feeding to commence at 0800  BOWEL/BLADDER: Incontinent of B/B. External catheter in place.    ABNL LAB/BG:   DRAIN/DEVICES: GJ tube, external catheter, PIV infusing at 100 ml/hr  TELEMETRY RHYTHM: NSR  SKIN: Mepilex to coccyx CDI  TESTS/PROCEDURES: N/a  D/C DAY/GOALS/PLACE: A couple of days  OTHER IMPORTANT INFO: Tube feeding was stopped at midnight. To re-commence at 0800. Contact precautions maintained

## 2020-08-22 NOTE — PROGRESS NOTES
MD Notification    Notified Person: MD    Notified Person Name: Dr. Barahona     Notification Date/Time: 8/22/2020; 1715     Notification Interaction: web-based paging     Purpose of Notification: Hello; pt. sodium 139. Thank you!     Orders Received: no additional orders at this time.     Comments:

## 2020-08-22 NOTE — PLAN OF CARE
Summary:     DATE & TIME: 8/21/20 9588-0333  Cognitive Concerns/ Orientation : Pt nonverbal, HECTOR, does say Hi.    BEHAVIOR & AGGRESSION TOOL COLOR: Green  CIWA SCORE: NA   ABNL VS/O2: VSS on room air.   MOBILITY: Ax2, total, turn/repo, lift  PAIN MANAGMENT: Moans at times, per RN giving report this is normal for him. No other nonverbal indicators of pain.   DIET: NPO, tube feeds started at 7 pm and will run until midnight. Tomorrow will begin his normal schedule of 8am-8pm.   BOWEL/BLADDER: Incontinent, condom cath on.   ABNL LAB/BG: Q4hr , 131, 133, 136, 137; WBC 20.8, Hgb 10.8, .   DRAIN/DEVICES: PIV infusing d5 at 100 ml/hr. GJ tube. Condom cath on.   TELEMETRY RHYTHM: NSR  SKIN: Excoriation to coccyx, blanchable redness, mepilex in place.   TESTS/PROCEDURES:  D/C DAY/GOALS/PLACE: A couple of days  OTHER IMPORTANT INFO: Stop tube feeds at midnight.

## 2020-08-22 NOTE — PROGRESS NOTES
[facility]    Hospitalist Progress Note    Assessment & Plan       Sepsis due to recurrent aspiration pneumonia versus pneumonitis.   -As mentioned above, he had more than 20 admissions for a similar presentation for aspiration pneumonia in 2019 and it seems that he had 7 admissions in 2020  as well.   - He is having these frequent readmissions despite a PEG and tube feedings and aspiration precautions.  However, he recovers quickly after admission, suggesting possible pneumonitis rather than pneumonia.  -  He had been evaluated by ID in the past.  -  This admission, he presented again with fever and altered mental status after a couple of episodes of emesis at home.   -Temperature in ER was 101.4.  His blood pressure was on lower side with the lowest 81/50.  He was tachycardic on admission.  Clinically, he looked dry.  His white blood cells are elevated at 17.1.  Lactic acid is normal.   - He received 1 dose of Zosyn in ER along with IV hydrocortisone 50 mg IV.  His blood pressure improved.  He is admitted to [facility] [place] for now.   -central line placed.   -using thigh cuff. sbp low 100s/-. Upper arm 80s/-  -more alert this am. Gave thumbs up to RN. Making urine.   -lactate remains nl range and improved.   - warm extrem. Good pulses  -transferred out of icu 8/21. Hypotension resolved 8/21 am with fluid boluses. Nl lactae. Afebrile. Negative covid test.  - suspect aspiration pneumonia/pneumonitis  - nl renal fx. Hyponatremia from dehydration resolved.   -lungs clearer. No focal infectious findings. Benign abd exam.     Plan:  -IV Zosyn for now, short course  -stop fluids  - Tylenol   --stopped stress dose steroids  - Aspiration precautions in place.   - Zofran, Compazine p.r.n. had been ordered.   -restarted TFs  -  resume his prior to admission Mucomyst and albuterol nebulization 4 times daily.  -  Incentive spirometry and Acapella had been ordered.  Also, it might be difficult for him to perform this.          Hyponatremia. Hypovolemic from dehydration- resolved.   -very likely fairly acute in last 2-3 days likely per onset of sxs starting ~8/19  -134 8/15 last admission day of discharge  - His sodium is 120.  suspect volume down.   -1L NS in ED with NS maint fliuds. Transitioned to LR. Given rise in Na discussed case with intensivist and nephrology. Started on high rate free water but no decrease in serum Na to slow rate. Suspect given nl renal function pt clearing free water.   - pt doing well.   - suspect 2/2 dehydration.   -Na 137 last night on D5W 200cc/hr. Stable at 138 this am on 100cc/hr.   Plan;   - stop d5W, cont with TFs with free water flushes.   -recheck bmp am, Na in 4 hours.            History of hypopituitarism.   - He had been on hydrocortisone 20 mg every morning and 10 mg at dinnertime.   - He is also on levothyroxine 150 mcg p.o. daily and testosterone injection every 2 weeks.     - resumed his prior to admission levothyroxine.    - resumed PTA bid Cortef, stopped stress dose steroids on 8/21.   -follow lytes.     Recurrent N/V  -has not occurred in hospital on prior admissions   CT abd 8/10 unremarkeable. Nl lipase and LFs  Seen by [facility] GI service 8/12/20, recommend aspiration precuations, daily PPI,  recommend to c/w J tube feeding and open up the GI tube with either intermittent suction or gravity to empty up gastric content; given this is a G-J tube it will provide the best decompression of the stomach  - pt's mother not leaving G port open to gravity or suction. Will rediscuss with GI tomorrow.   -no sxs in hospital       History of TBI, spastic hemiplegia, severe dysphagia:   At baseline mentation  Follows simple commands, gives thumbs up last 2 days  No move R side  Has asymmetric pupils      History of seizure disorder.   - Continue his prior to admission Tegretol and brivaracetam.        History of pancreatic cyst noted on a prior CT of the abdomen that apparently remained stable on  the last CT on 08/13/2020.     Nutrition.   On TFs 8am to 8pm. Nutrition consultd. On free water flushes 200cc q4hr down FT with 60cc free water at 8am and 8pm.   Follow bmp      Deep venous thrombosis prophylaxis.  Pneumatic compression device.       COVID-19 status. covid negative. Low suspicion      CODE STATUS:  I was not able to discuss code status with the patient.  His mother was not present at the time of my examination.  I have ordered a full code for now, as per his prior orders. Know to be full code, pt know to me from recent admission.      DISPOSITION: transferred from icu to floor bed on 8/21.    Admit inpatient. likley discharge in 1-2 days.          Alvaro Barahona MD  Text Page  (7am to 6pm)  Interval History   d5W overnight with no reduction in Na, rate decreased.   Stable night.   Tolerating TFs  No acute issues.       -Data reviewed today: I reviewed all new labs and imaging results over the last 24 hours. I personally reviewed imaging and labs since admission    Physical Exam   Temp: 97.3  F (36.3  C) Temp src: Oral BP: 122/70 Pulse: 91   Resp: 20 SpO2: 95 % O2 Device: None (Room air)    Vitals:    08/21/20 0400 08/22/20 0603   Weight: 69.4 kg (153 lb) 69.9 kg (154 lb 3.2 oz)     Vital Signs with Ranges  Temp:  [97.2  F (36.2  C)-97.5  F (36.4  C)] 97.3  F (36.3  C)  Pulse:  [65-93] 91  Resp:  [8-25] 20  BP: (109-138)/(55-70) 122/70  SpO2:  [91 %-97 %] 95 %  I/O last 3 completed shifts:  In: 4549.84 [I.V.:2462.34; NG/GT:1587.5; IV Piggyback:500]  Out: 3950 [Urine:3950]    Constitutional: Awake, nad  HEENT; asymmetric pupils, R >L, reactive  Respiratory: Breathing easily,sounds fairly clear. Clear anteriorly, ? Crackles R base. Left lung clear.  Cardiovascular: RRR no r/g/m. Not tachy  GI: peg tube looks fine.   R groin old central line site looks fine  Soft, nt, nt  Skin/Integumen: no rash or cyanosis.   Neuro: resists turning or eye exam, gave thumbs up to me on command. Follows simple  commands, nonverbal, alert, at baseline cognitive impairment, Not typically move R arm      Medications     dextrose         acetylcysteine  2 mL Nebulization 4x Daily     albuterol  2.5 mg Nebulization Q4H While awake     aspirin  81 mg Oral or Feeding Tube Daily     Brivaracetam  100 mg Oral or Feeding Tube BID     carBAMazepine  150 mg Oral or Feeding Tube Q6H     ferrous sulfate  220 mg Oral or Feeding Tube Daily     fiber modular (NUTRISOURCE FIBER)  1 packet Per G Tube Daily     hydrocortisone  10 mg Per Feeding Tube Daily with supper     hydrocortisone  20 mg Per Feeding Tube QAM     levothyroxine  150 mcg Oral QAM     metoclopramide  5 mg Per Feeding Tube BID     pantoprazole  40 mg Per J Tube Daily     piperacillin-tazobactam  3.375 g Intravenous Q6H     potassium & sodium phosphates  1 packet Oral TID     potassium chloride  10 mEq Intravenous Once     sodium chloride (PF)  3 mL Intracatheter Q8H     vitamin C  500 mg Oral or Feeding Tube Daily     vitamin D3  50 mcg Oral Daily       Data   Recent Labs   Lab 08/22/20  1127 08/22/20  0704 08/21/20  1933  08/21/20  0815 08/21/20  0007   WBC  --  12.2*  --   --  20.8* 17.1*   HGB  --  10.6*  --   --  10.8* 12.1*   MCV  --  88  --   --  86 86   PLT  --  316  --   --  345 354    138 137   < > 129* 120*   POTASSIUM  --  3.6  --   --  4.7 4.7   CHLORIDE  --  106  --   --  100 89*   CO2  --  26  --   --  23 25   BUN  --  13  --   --  15 15   CR  --  0.81  --   --  0.86 0.76   ANIONGAP  --  6  --   --  6 6   STEVE  --  8.0*  --   --  7.6* 7.8*   GLC  --  131*  --   --  122* 93   ALBUMIN  --   --   --   --   --  2.7*   PROTTOTAL  --   --   --   --   --  8.5   BILITOTAL  --   --   --   --   --  0.3   ALKPHOS  --   --   --   --   --  131   ALT  --   --   --   --   --  59   AST  --   --   --   --   --  51*   TROPI  --   --   --   --   --  <0.015    < > = values in this interval not displayed.       Imaging:   No results found for this or any previous visit (from  the past 24 hour(s)).

## 2020-08-23 LAB
ANION GAP SERPL CALCULATED.3IONS-SCNC: 5 MMOL/L (ref 3–14)
BUN SERPL-MCNC: 13 MG/DL (ref 7–30)
CALCIUM SERPL-MCNC: 8.2 MG/DL (ref 8.5–10.1)
CHLORIDE SERPL-SCNC: 105 MMOL/L (ref 94–109)
CO2 SERPL-SCNC: 29 MMOL/L (ref 20–32)
CREAT SERPL-MCNC: 0.91 MG/DL (ref 0.66–1.25)
ERYTHROCYTE [DISTWIDTH] IN BLOOD BY AUTOMATED COUNT: 14.2 % (ref 10–15)
GFR SERPL CREATININE-BSD FRML MDRD: >90 ML/MIN/{1.73_M2}
GLUCOSE BLDC GLUCOMTR-MCNC: 158 MG/DL (ref 70–99)
GLUCOSE BLDC GLUCOMTR-MCNC: 201 MG/DL (ref 70–99)
GLUCOSE BLDC GLUCOMTR-MCNC: 276 MG/DL (ref 70–99)
GLUCOSE BLDC GLUCOMTR-MCNC: 81 MG/DL (ref 70–99)
GLUCOSE BLDC GLUCOMTR-MCNC: 83 MG/DL (ref 70–99)
GLUCOSE BLDC GLUCOMTR-MCNC: 86 MG/DL (ref 70–99)
GLUCOSE SERPL-MCNC: 100 MG/DL (ref 70–99)
HCT VFR BLD AUTO: 33.1 % (ref 40–53)
HGB BLD-MCNC: 10.9 G/DL (ref 13.3–17.7)
MCH RBC QN AUTO: 29.4 PG (ref 26.5–33)
MCHC RBC AUTO-ENTMCNC: 32.9 G/DL (ref 31.5–36.5)
MCV RBC AUTO: 89 FL (ref 78–100)
PHOSPHATE SERPL-MCNC: 2.7 MG/DL (ref 2.5–4.5)
PLATELET # BLD AUTO: 352 10E9/L (ref 150–450)
POTASSIUM SERPL-SCNC: 3.6 MMOL/L (ref 3.4–5.3)
RBC # BLD AUTO: 3.71 10E12/L (ref 4.4–5.9)
SODIUM SERPL-SCNC: 139 MMOL/L (ref 133–144)
WBC # BLD AUTO: 8.8 10E9/L (ref 4–11)

## 2020-08-23 PROCEDURE — 25000132 ZZH RX MED GY IP 250 OP 250 PS 637: Mod: GY | Performed by: INTERNAL MEDICINE

## 2020-08-23 PROCEDURE — 84100 ASSAY OF PHOSPHORUS: CPT | Performed by: INTERNAL MEDICINE

## 2020-08-23 PROCEDURE — 36415 COLL VENOUS BLD VENIPUNCTURE: CPT | Performed by: INTERNAL MEDICINE

## 2020-08-23 PROCEDURE — 00000146 ZZHCL STATISTIC GLUCOSE BY METER IP

## 2020-08-23 PROCEDURE — 40000275 ZZH STATISTIC RCP TIME EA 10 MIN

## 2020-08-23 PROCEDURE — 12000000 ZZH R&B MED SURG/OB

## 2020-08-23 PROCEDURE — 94640 AIRWAY INHALATION TREATMENT: CPT | Mod: 76

## 2020-08-23 PROCEDURE — 25000125 ZZHC RX 250: Performed by: INTERNAL MEDICINE

## 2020-08-23 PROCEDURE — 80048 BASIC METABOLIC PNL TOTAL CA: CPT | Performed by: INTERNAL MEDICINE

## 2020-08-23 PROCEDURE — 27210429 ZZH NUTRITION PRODUCT INTERMEDIATE LITER

## 2020-08-23 PROCEDURE — 25000128 H RX IP 250 OP 636: Performed by: INTERNAL MEDICINE

## 2020-08-23 PROCEDURE — 94640 AIRWAY INHALATION TREATMENT: CPT

## 2020-08-23 PROCEDURE — 99233 SBSQ HOSP IP/OBS HIGH 50: CPT | Performed by: INTERNAL MEDICINE

## 2020-08-23 PROCEDURE — 85027 COMPLETE CBC AUTOMATED: CPT | Performed by: INTERNAL MEDICINE

## 2020-08-23 PROCEDURE — 25000131 ZZH RX MED GY IP 250 OP 636 PS 637: Mod: GY | Performed by: INTERNAL MEDICINE

## 2020-08-23 RX ORDER — PROCHLORPERAZINE 25 MG
25 SUPPOSITORY, RECTAL RECTAL EVERY 12 HOURS PRN
Qty: 15 SUPPOSITORY | Refills: 0 | Status: ON HOLD | OUTPATIENT
Start: 2020-08-23 | End: 2021-11-13

## 2020-08-23 RX ORDER — AMOXICILLIN AND CLAVULANATE POTASSIUM 400; 57 MG/5ML; MG/5ML
500 POWDER, FOR SUSPENSION ORAL EVERY 8 HOURS
Qty: 50 ML | Refills: 0 | Status: SHIPPED | OUTPATIENT
Start: 2020-08-24 | End: 2020-08-26

## 2020-08-23 RX ORDER — AMOXICILLIN AND CLAVULANATE POTASSIUM 400; 57 MG/5ML; MG/5ML
500 POWDER, FOR SUSPENSION ORAL EVERY 8 HOURS
Status: DISCONTINUED | OUTPATIENT
Start: 2020-08-23 | End: 2020-08-24 | Stop reason: HOSPADM

## 2020-08-23 RX ADMIN — Medication 50 MCG: at 08:07

## 2020-08-23 RX ADMIN — METOCLOPRAMIDE 5 MG: 5 SOLUTION ORAL at 20:36

## 2020-08-23 RX ADMIN — LEVOTHYROXINE SODIUM 150 MCG: 150 TABLET ORAL at 08:07

## 2020-08-23 RX ADMIN — CARBAMAZEPINE 150 MG: 100 SUSPENSION ORAL at 12:25

## 2020-08-23 RX ADMIN — CARBAMAZEPINE 150 MG: 100 SUSPENSION ORAL at 05:19

## 2020-08-23 RX ADMIN — BRIVARACETAM 100 MG: 10 SOLUTION ORAL at 21:36

## 2020-08-23 RX ADMIN — POTASSIUM & SODIUM PHOSPHATES POWDER PACK 280-160-250 MG 1 PACKET: 280-160-250 PACK at 16:18

## 2020-08-23 RX ADMIN — ALBUTEROL SULFATE 2.5 MG: 2.5 SOLUTION RESPIRATORY (INHALATION) at 10:27

## 2020-08-23 RX ADMIN — AMOXICILLIN AND CLAVULANATE POTASSIUM 500 MG: 400; 57 POWDER, FOR SUSPENSION ORAL at 20:36

## 2020-08-23 RX ADMIN — ASPIRIN 81 MG 81 MG: 81 TABLET ORAL at 08:07

## 2020-08-23 RX ADMIN — METOCLOPRAMIDE 5 MG: 5 SOLUTION ORAL at 08:06

## 2020-08-23 RX ADMIN — ACETYLCYSTEINE 2 ML: 200 SOLUTION ORAL; RESPIRATORY (INHALATION) at 15:32

## 2020-08-23 RX ADMIN — Medication 1 PACKET: at 11:27

## 2020-08-23 RX ADMIN — PANTOPRAZOLE SODIUM 40 MG: 40 TABLET, DELAYED RELEASE ORAL at 08:06

## 2020-08-23 RX ADMIN — INSULIN ASPART 1 UNITS: 100 INJECTION, SOLUTION INTRAVENOUS; SUBCUTANEOUS at 12:25

## 2020-08-23 RX ADMIN — PIPERACILLIN SODIUM AND TAZOBACTAM SODIUM 3.38 G: 3; .375 INJECTION, POWDER, LYOPHILIZED, FOR SOLUTION INTRAVENOUS at 02:05

## 2020-08-23 RX ADMIN — PIPERACILLIN SODIUM AND TAZOBACTAM SODIUM 3.38 G: 3; .375 INJECTION, POWDER, LYOPHILIZED, FOR SOLUTION INTRAVENOUS at 14:05

## 2020-08-23 RX ADMIN — CARBAMAZEPINE 150 MG: 100 SUSPENSION ORAL at 17:26

## 2020-08-23 RX ADMIN — ACETYLCYSTEINE 2 ML: 200 SOLUTION ORAL; RESPIRATORY (INHALATION) at 21:06

## 2020-08-23 RX ADMIN — HYDROCORTISONE 10 MG: 10 TABLET ORAL at 16:18

## 2020-08-23 RX ADMIN — ACETYLCYSTEINE 2 ML: 200 SOLUTION ORAL; RESPIRATORY (INHALATION) at 07:14

## 2020-08-23 RX ADMIN — ALBUTEROL SULFATE 2.5 MG: 2.5 SOLUTION RESPIRATORY (INHALATION) at 21:06

## 2020-08-23 RX ADMIN — INSULIN ASPART 3 UNITS: 100 INJECTION, SOLUTION INTRAVENOUS; SUBCUTANEOUS at 17:26

## 2020-08-23 RX ADMIN — MINERAL SUPPLEMENT IRON 300 MG / 5 ML STRENGTH LIQUID 100 PER BOX UNFLAVORED 220 MG: at 08:06

## 2020-08-23 RX ADMIN — POTASSIUM & SODIUM PHOSPHATES POWDER PACK 280-160-250 MG 1 PACKET: 280-160-250 PACK at 21:36

## 2020-08-23 RX ADMIN — HYDROCORTISONE 20 MG: 20 TABLET ORAL at 08:07

## 2020-08-23 RX ADMIN — ALBUTEROL SULFATE 2.5 MG: 2.5 SOLUTION RESPIRATORY (INHALATION) at 07:14

## 2020-08-23 RX ADMIN — BRIVARACETAM 100 MG: 10 SOLUTION ORAL at 08:06

## 2020-08-23 RX ADMIN — CARBAMAZEPINE 150 MG: 100 SUSPENSION ORAL at 00:23

## 2020-08-23 RX ADMIN — PIPERACILLIN SODIUM AND TAZOBACTAM SODIUM 3.38 G: 3; .375 INJECTION, POWDER, LYOPHILIZED, FOR SOLUTION INTRAVENOUS at 08:08

## 2020-08-23 RX ADMIN — POTASSIUM & SODIUM PHOSPHATES POWDER PACK 280-160-250 MG 1 PACKET: 280-160-250 PACK at 08:07

## 2020-08-23 RX ADMIN — ACETYLCYSTEINE 2 ML: 200 SOLUTION ORAL; RESPIRATORY (INHALATION) at 10:27

## 2020-08-23 RX ADMIN — ALBUTEROL SULFATE 2.5 MG: 2.5 SOLUTION RESPIRATORY (INHALATION) at 15:32

## 2020-08-23 RX ADMIN — OXYCODONE HYDROCHLORIDE AND ACETAMINOPHEN 500 MG: 500 TABLET ORAL at 08:10

## 2020-08-23 ASSESSMENT — ACTIVITIES OF DAILY LIVING (ADL)
ADLS_ACUITY_SCORE: 40
ADLS_ACUITY_SCORE: 44
ADLS_ACUITY_SCORE: 40

## 2020-08-23 NOTE — PLAN OF CARE
DATE & TIME: 8/22/2020; 0419-7973  Cognitive Concerns/ Orientation : HECTOR -nonverbal at baseline; calm and cooperative  BEHAVIOR & AGGRESSION TOOL COLOR: Green   CIWA SCORE: N/A   ABNL VS/O2: VSS on RA; continuous pulse ox   MOBILITY: total care; turn and repo q2h   PAIN MANAGMENT:non-verbal indicators of pain;   DIET: Strict NPO; feeding tube in place   BOWEL/BLADDER: BS active x 4; incontinent   ABNL LAB/BG: Na+ = BG= 86,83   DRAIN/DEVICES: L. PIV/SL; IV antibiotics; tube feeding (jejunum port) with a goal rate of 100 mL/hr and 200 mL flushes q4h (8a.m.-8 p.m.)(flushes continue when tube feeding is off); external male catheter came off  last shift  TELEMETRY RHYTHM: NSR   SKIN: Scattered bruising; redness on L. Arm (encouraged elevation on pillow); redness to coccyx (mepilex CDI)  TESTS/PROCEDURES: N/A  D/C DAY/GOALS/PLACE: pending discharge   OTHER IMPORTANT INFO: BG monitoring q4h. vital q4h; oral cares provided this SHIFT and yonker for suctioning (secretions thick/clear); contact precautions maintained for MRSA/VRE; Nursing will continue to monitor and assess.

## 2020-08-23 NOTE — PLAN OF CARE
DATE & TIME: 8/22/2020; [id number]   Cognitive Concerns/ Orientation : HECTOR -nonverbal at baseline; calm and cooperative  BEHAVIOR & AGGRESSION TOOL COLOR: Green   CIWA SCORE: N/A   ABNL VS/O2: VSS on RA; continuous pulse ox   MOBILITY: total care; turn and repo q2h   PAIN MANAGMENT:non-verbal indicators of pain;   DIET: Strict NPO; feeding tube in place   BOWEL/BLADDER: BS active x 4; continent   ABNL LAB/BG: Na+ = 139 (MD notified); BG= 206, 154;   DRAIN/DEVICES: L. PIV/SL; IV antibiotics; tube feeding (jejunum port) with a goal rate of 100 mL/hr and 200 mL flushes q4h (8a.m.-8 p.m.)(flushes continue when tube feeding is off); external male catheter (good output= 375)  TELEMETRY RHYTHM: NSR   SKIN: Scattered bruising; redness on L. Arm (encouraged elevation on pillow); redness to coccyx (mepilex CDI)  TESTS/PROCEDURES: N/A  D/C DAY/GOALS/PLACE: pending discharge   OTHER IMPORTANT INFO: Pt. BG elevated this evening (MD notified), BG monitoring orders added; BG monitoring q4h. vital q4h; pt. Has a congested nonproductive cough; oral cares provided this evening and yonker for suctioning (secretions thick/clear); contact precautions maintained for MRSA/VRE; Nursing will continue to monitor and assess.     MD/RN ROUNDING SIGNED OFF D/E SHIFT:N/A  COMMIT TO SIT DONE AND SIGNED OFF: COMPLETE

## 2020-08-23 NOTE — PROGRESS NOTES
[facility]    Hospitalist Progress Note    Assessment & Plan       Sepsis due to recurrent aspiration pneumonia versus pneumonitis.   -As mentioned above, he had more than 20 admissions for a similar presentation for aspiration pneumonia in 2019 and it seems that he had 7 admissions in 2020  as well.   - He is having these frequent readmissions despite a PEG and tube feedings and aspiration precautions.  However, he recovers quickly after admission, suggesting possible pneumonitis rather than pneumonia.  -  He had been evaluated by ID in the past.  -  This admission, he presented again with fever and altered mental status after a couple of episodes of emesis at home.   -Temperature in ER was 101.4.  His blood pressure was on lower side with the lowest 81/50.  He was tachycardic on admission.  Clinically, he looked dry.  His white blood cells are elevated at 17.1.  Lactic acid is normal.   - He received 1 dose of Zosyn in ER along with IV hydrocortisone 50 mg IV.  His blood pressure improved.  He is admitted to [facility] for now.   -central line placed.   -using thigh cuff. sbp low 100s/-. Upper arm 80s/-  -more alert this am. Gave thumbs up to RN. Making urine.   -lactate remains nl range and improved.   - warm extrem. Good pulses  -transferred out of icu 8/21. Hypotension resolved 8/21 am with fluid boluses. Nl lactae. Afebrile. Negative covid test.  - suspect aspiration pneumonia/pneumonitis  - nl renal fx. Hyponatremia from dehydration resolved.   -lungs clearer. No focal infectious findings. Benign abd exam.   - today. Lungs clear     Plan:  -stop zosyn after day 3 and start augmentin liquid down FT for 2 more days.   -stop fluids  - Tylenol   --stopped stress dose steroids  - Aspiration precautions in place.   - Zofran, Compazine p.r.n. had been ordered.   -restarted TFs-tolerating.   -  resume his prior to admission Mucomyst and albuterol nebulization 4 times daily.  -  Incentive spirometry and  Acapella had been ordered.  Also, it might be difficult for him to perform this.   -G tube to gravity for decompression. Monitor fluid output        Hyponatremia. Hypovolemic from dehydration- resolved.   -very likely fairly acute in last 2-3 days likely per onset of sxs starting ~8/19  -134 8/15 last admission day of discharge  - His sodium is 120.  suspect volume down.   -1L NS in ED with NS maint fliuds. Transitioned to LR. Given rise in Na discussed case with intensivist and nephrology. Started on high rate free water but no decrease in serum Na to slow rate. Suspect given nl renal function pt clearing free water.   - pt doing well.   - suspect 2/2 dehydration.   -Na 137 last night on D5W 200cc/hr. Stable at 138 this am on 100cc/hr.   -fluids stopped am 8/22.   - Na stable 139 since then   - am bmp           History of hypopituitarism.   - He had been on hydrocortisone 20 mg every morning and 10 mg at dinnertime.   - He is also on levothyroxine 150 mcg p.o. daily and testosterone injection every 2 weeks.     - resumed his prior to admission levothyroxine.    - resumed PTA bid Cortef, stopped stress dose steroids on 8/21.   -follow lytes.     Recurrent N/V  -has not occurred in hospital on prior admissions   CT abd 8/10 unremarkeable. Nl lipase and LFs  Seen by [facility] GI service 8/12/20, recommend aspiration precuations, daily PPI,  recommend to c/w J tube feeding and open up the GI tube with either intermittent suction or gravity to empty up gastric content; given this is a G-J tube it will provide the best decompression of the stomach  - pt's mother not leaving G port open to gravity or suction. Will rediscuss with GI tomorrow.   -no sxs in hospital   -discussed with [facility] GI, cont aspiration precuations, place G tube port to gravity and pt to go home with this set up. Follow  Lytes outpatient and monitor for G tube output. If significant would need replacement of free water increased to match G tube output.  Having G tube open to gravity to decompress stomach may reduce his episodes of N/V and aspiration.       History of TBI, spastic hemiplegia, severe dysphagia:   At baseline mentation  Follows simple commands, gives thumbs up last 2 days  No move R side  Has asymmetric pupils      History of seizure disorder.   - Continue his prior to admission Tegretol and brivaracetam.        History of pancreatic cyst noted on a prior CT of the abdomen that apparently remained stable on the last CT on 08/13/2020.     Nutrition.   On TFs 8am to 8pm. Nutrition consultd. On free water flushes 200cc q4hr down FT 24/7 and with 60cc free water at 8am and 8pm.   Follow bmp      Deep venous thrombosis prophylaxis.  Pneumatic compression device.       COVID-19 status. covid negative. Low suspicion      CODE STATUS:  I was not able to discuss code status with the patient.  His mother was not present at the time of my examination.  I have ordered a full code for now, as per his prior orders. Know to be full code, pt know to me from recent admission.      DISPOSITION:discharge tomorrow. Updated pt's mother     Discussed care plan iwht RN, GI and pt's familiy    Discuss home care with care coordinator, G tube drainage system with IR before discharge. Needs Pcp follow up with labs         Alvaro Barahona MD  Text Page  (7am to 6pm)  Interval History   Stable  Night. No breathing issues.   Tolerate TFs      -Data reviewed today: I reviewed all new labs and imaging results over the last 24 hours. I personally reviewed imaging and labs since admission    Physical Exam   Temp: 98.9  F (37.2  C) Temp src: Oral BP: 129/65 Pulse: 91   Resp: 18 SpO2: 95 % O2 Device: None (Room air)    Vitals:    08/21/20 0400 08/22/20 0603   Weight: 69.4 kg (153 lb) 69.9 kg (154 lb 3.2 oz)     Vital Signs with Ranges  Temp:  [97.5  F (36.4  C)-98.9  F (37.2  C)] 98.9  F (37.2  C)  Pulse:  [66-99] 91  Resp:  [18-20] 18  BP: (112-129)/(62-65) 129/65  SpO2:  [93 %-97  %] 95 %  I/O last 3 completed shifts:  In: 3158 [I.V.:300; NG/GT:1865]  Out: 1350 [Urine:1350]    Constitutional: Awake, nad, turns to me when enter room  HEENT; asymmetric pupils, R >L,  Respiratory: Breathing easily,sounds fairly clear anterioly and posteriorly. Breathing easily  Cardiovascular: RRR no r/g/m. Not tachy  GI: peg tube looks fine.   Soft, nt, nt  Skin/Integumen: no rash or cyanosis.   Neuro: alert, follows simple commands. Non verbal.  attends to examiner, Not typically move R arm      Medications     dextrose         acetylcysteine  2 mL Nebulization 4x Daily     albuterol  2.5 mg Nebulization Q4H While awake     amoxicillin-clavulanate  500 mg Oral Q8H     aspirin  81 mg Oral or Feeding Tube Daily     Brivaracetam  100 mg Oral or Feeding Tube BID     carBAMazepine  150 mg Oral or Feeding Tube Q6H     ferrous sulfate  220 mg Oral or Feeding Tube Daily     fiber modular (NUTRISOURCE FIBER)  1 packet Per G Tube Daily     hydrocortisone  10 mg Per Feeding Tube Daily with supper     hydrocortisone  20 mg Per Feeding Tube QAM     insulin aspart  1-10 Units Subcutaneous TID AC     insulin aspart  1-7 Units Subcutaneous At Bedtime     levothyroxine  150 mcg Oral QAM     metoclopramide  5 mg Per Feeding Tube BID     pantoprazole  40 mg Per J Tube Daily     potassium & sodium phosphates  1 packet Oral TID     sodium chloride (PF)  3 mL Intracatheter Q8H     vitamin C  500 mg Oral or Feeding Tube Daily     vitamin D3  50 mcg Oral Daily       Data   Recent Labs   Lab 08/23/20  0845 08/22/20  1624 08/22/20  1127 08/22/20  0704  08/21/20  0815 08/21/20  0007   WBC 8.8  --   --  12.2*  --  20.8* 17.1*   HGB 10.9*  --   --  10.6*  --  10.8* 12.1*   MCV 89  --   --  88  --  86 86     --   --  316  --  345 354    139 139 138   < > 129* 120*   POTASSIUM 3.6  --   --  3.6  --  4.7 4.7   CHLORIDE 105  --   --  106  --  100 89*   CO2 29  --   --  26  --  23 25   BUN 13  --   --  13  --  15 15   CR 0.91  --    --  0.81  --  0.86 0.76   ANIONGAP 5  --   --  6  --  6 6   STEVE 8.2*  --   --  8.0*  --  7.6* 7.8*   *  --   --  131*  --  122* 93   ALBUMIN  --   --   --   --   --   --  2.7*   PROTTOTAL  --   --   --   --   --   --  8.5   BILITOTAL  --   --   --   --   --   --  0.3   ALKPHOS  --   --   --   --   --   --  131   ALT  --   --   --   --   --   --  59   AST  --   --   --   --   --   --  51*   TROPI  --   --   --   --   --   --  <0.015    < > = values in this interval not displayed.       Imaging:   No results found for this or any previous visit (from the past 24 hour(s)).

## 2020-08-23 NOTE — PROGRESS NOTES
MD Notification    Notified Person: MD    Notified Person Name: Dr. Connolly     Notification Date/Time: 8/22/2020; 1912     Notification Interaction: on-call hospitalist service     Purpose of Notification: pt. Most recent BG= 206; no orders for insulin at this time. Pt. Is on strict NPO and currently on tube feedings only; Any further recommendations at this time?     Orders Received: 10 additional orders place for BG monitoring. Will monitor for hypoglycemia symptoms and correct BG.     Comments:    MD will discuss with primary hospitalist tomorrow about tube feeding schedule. Nursing will continue to monitor and assess at this time.     Rosa Treadwell RN on 8/22/2020 at 7:22 PM

## 2020-08-23 NOTE — PROGRESS NOTES
On Call    Elevated glucose  Qid insulin per corrective dose added.     Reji Connolly MD   Pager: [phone]  Cell Phone:  [phone]

## 2020-08-23 NOTE — PLAN OF CARE
DATE & TIME: 8/23/20  2502-8150  Cognitive Concerns/ Orientation : HECTOR -nonverbal at baseline; calm and cooperative  BEHAVIOR & AGGRESSION TOOL COLOR: Green   CIWA SCORE: N/A   ABNL VS/O2: VSS on RA; continuous pulse ox   MOBILITY: total care; turn and repo q2h; up in chair today via ceiling lift  PAIN MANAGMENT:non-verbal indicators of pain; denies pain  DIET: Strict NPO; jejunal feedings 8a-8p with water flushes q4h  BOWEL/BLADDER: BS active x 4; incontinent   ABNL LAB/BG: BG= 100, 158  DRAIN/DEVICES: L. PIV/SL; IV antibiotics; tube feeding (jejunum port) with a goal rate of 100 mL/hr and 200 mL flushes q4h (8a.m.-8 p.m.)(flushes continue when tube feeding is off); external male catheter TELEMETRY RHYTHM: NSR, now d/c'd  SKIN: Scattered bruising; redness on L. Arm (encouraged elevation on pillow); redness to coccyx (mepilex CDI)  TESTS/PROCEDURES: N/A  D/C DAY/GOALS/PLACE: pending discharge   OTHER IMPORTANT INFO:  new order to place gastric port at gravity drainage.  Will do, may need to ask IR nurse for appropriate connector.  IV abx d/c'd; changed to liquid via FT

## 2020-08-24 VITALS
SYSTOLIC BLOOD PRESSURE: 121 MMHG | TEMPERATURE: 97.5 F | OXYGEN SATURATION: 95 % | DIASTOLIC BLOOD PRESSURE: 55 MMHG | RESPIRATION RATE: 20 BRPM | BODY MASS INDEX: 20.91 KG/M2 | HEART RATE: 59 BPM | WEIGHT: 154.2 LBS

## 2020-08-24 LAB
ANION GAP SERPL CALCULATED.3IONS-SCNC: 5 MMOL/L (ref 3–14)
BUN SERPL-MCNC: 15 MG/DL (ref 7–30)
CALCIUM SERPL-MCNC: 8.7 MG/DL (ref 8.5–10.1)
CHLORIDE SERPL-SCNC: 100 MMOL/L (ref 94–109)
CO2 SERPL-SCNC: 30 MMOL/L (ref 20–32)
CREAT SERPL-MCNC: 0.75 MG/DL (ref 0.66–1.25)
GFR SERPL CREATININE-BSD FRML MDRD: >90 ML/MIN/{1.73_M2}
GLUCOSE BLDC GLUCOMTR-MCNC: 105 MG/DL (ref 70–99)
GLUCOSE BLDC GLUCOMTR-MCNC: 155 MG/DL (ref 70–99)
GLUCOSE BLDC GLUCOMTR-MCNC: 76 MG/DL (ref 70–99)
GLUCOSE BLDC GLUCOMTR-MCNC: 81 MG/DL (ref 70–99)
GLUCOSE SERPL-MCNC: 86 MG/DL (ref 70–99)
POTASSIUM SERPL-SCNC: 3.9 MMOL/L (ref 3.4–5.3)
SODIUM SERPL-SCNC: 135 MMOL/L (ref 133–144)

## 2020-08-24 PROCEDURE — 00000146 ZZHCL STATISTIC GLUCOSE BY METER IP

## 2020-08-24 PROCEDURE — 94640 AIRWAY INHALATION TREATMENT: CPT

## 2020-08-24 PROCEDURE — 99239 HOSP IP/OBS DSCHRG MGMT >30: CPT | Performed by: INTERNAL MEDICINE

## 2020-08-24 PROCEDURE — 40000275 ZZH STATISTIC RCP TIME EA 10 MIN

## 2020-08-24 PROCEDURE — 94640 AIRWAY INHALATION TREATMENT: CPT | Mod: 76

## 2020-08-24 PROCEDURE — 25000132 ZZH RX MED GY IP 250 OP 250 PS 637: Mod: GY | Performed by: INTERNAL MEDICINE

## 2020-08-24 PROCEDURE — 80048 BASIC METABOLIC PNL TOTAL CA: CPT | Performed by: INTERNAL MEDICINE

## 2020-08-24 PROCEDURE — 36415 COLL VENOUS BLD VENIPUNCTURE: CPT | Performed by: INTERNAL MEDICINE

## 2020-08-24 PROCEDURE — 25000125 ZZHC RX 250: Performed by: INTERNAL MEDICINE

## 2020-08-24 RX ORDER — SODIUM BICARBONATE 650 MG/1
650 TABLET ORAL DAILY
Status: DISCONTINUED | OUTPATIENT
Start: 2020-08-24 | End: 2020-08-24 | Stop reason: HOSPADM

## 2020-08-24 RX ADMIN — ALBUTEROL SULFATE 2.5 MG: 2.5 SOLUTION RESPIRATORY (INHALATION) at 00:15

## 2020-08-24 RX ADMIN — OXYCODONE HYDROCHLORIDE AND ACETAMINOPHEN 500 MG: 500 TABLET ORAL at 08:05

## 2020-08-24 RX ADMIN — MINERAL SUPPLEMENT IRON 300 MG / 5 ML STRENGTH LIQUID 100 PER BOX UNFLAVORED 220 MG: at 08:04

## 2020-08-24 RX ADMIN — CARBAMAZEPINE 150 MG: 100 SUSPENSION ORAL at 11:34

## 2020-08-24 RX ADMIN — CARBAMAZEPINE 150 MG: 100 SUSPENSION ORAL at 05:02

## 2020-08-24 RX ADMIN — ALBUTEROL SULFATE 2.5 MG: 2.5 SOLUTION RESPIRATORY (INHALATION) at 11:42

## 2020-08-24 RX ADMIN — POTASSIUM & SODIUM PHOSPHATES POWDER PACK 280-160-250 MG 1 PACKET: 280-160-250 PACK at 08:08

## 2020-08-24 RX ADMIN — Medication 50 MCG: at 08:07

## 2020-08-24 RX ADMIN — ACETYLCYSTEINE 2 ML: 200 SOLUTION ORAL; RESPIRATORY (INHALATION) at 07:48

## 2020-08-24 RX ADMIN — ALBUTEROL SULFATE 2.5 MG: 2.5 SOLUTION RESPIRATORY (INHALATION) at 07:48

## 2020-08-24 RX ADMIN — METOCLOPRAMIDE 5 MG: 5 SOLUTION ORAL at 08:05

## 2020-08-24 RX ADMIN — Medication 1 PACKET: at 09:37

## 2020-08-24 RX ADMIN — CARBAMAZEPINE 150 MG: 100 SUSPENSION ORAL at 00:59

## 2020-08-24 RX ADMIN — INSULIN ASPART 1 UNITS: 100 INJECTION, SOLUTION INTRAVENOUS; SUBCUTANEOUS at 11:32

## 2020-08-24 RX ADMIN — HYDROCORTISONE 20 MG: 20 TABLET ORAL at 08:06

## 2020-08-24 RX ADMIN — ACETYLCYSTEINE 4 ML: 200 SOLUTION ORAL; RESPIRATORY (INHALATION) at 11:43

## 2020-08-24 RX ADMIN — AMOXICILLIN AND CLAVULANATE POTASSIUM 500 MG: 400; 57 POWDER, FOR SUSPENSION ORAL at 11:34

## 2020-08-24 RX ADMIN — LEVOTHYROXINE SODIUM 150 MCG: 150 TABLET ORAL at 08:05

## 2020-08-24 RX ADMIN — AMOXICILLIN AND CLAVULANATE POTASSIUM 500 MG: 400; 57 POWDER, FOR SUSPENSION ORAL at 03:41

## 2020-08-24 RX ADMIN — SODIUM BICARBONATE 650 MG TABLET 650 MG: at 11:34

## 2020-08-24 RX ADMIN — ASPIRIN 81 MG 81 MG: 81 TABLET ORAL at 08:07

## 2020-08-24 RX ADMIN — PANTOPRAZOLE SODIUM 40 MG: 40 TABLET, DELAYED RELEASE ORAL at 08:07

## 2020-08-24 RX ADMIN — BRIVARACETAM 100 MG: 10 SOLUTION ORAL at 09:26

## 2020-08-24 ASSESSMENT — ACTIVITIES OF DAILY LIVING (ADL)
ADLS_ACUITY_SCORE: 44
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
## 2020-08-24 NOTE — PLAN OF CARE
Discharge    Patient discharged to home via stretcher transport  Care plan note:  Has clear lungs on auscultation; occasional productive cough, refuses to have any oral suctioning and mouth cares; Room air sat mid to upper 90's.  VSS.  Afebrile.  Tolerating tube feeding at goal and water flushes.  Drainage bag to gastric port, greenish drainage.  Incontinent large amount of stool and urine prior to discharge.  Coccyx very reddened, barrier cream applied x 2 this shift after cleansing.      Listed belongings gathered and returned to patient. Yes  Care Plan and Patient education resolved: Yes  Prescriptions if needed, hard copies sent with patient  filled and sent with patient; protonix will be delivered from compound pharmacy.  Home and hospital acquired medications returned to patient: No  Medication Bin checked and emptied on discharge Yes  Follow up appointment made for patient: No

## 2020-08-24 NOTE — PLAN OF CARE
DATE & TIME: 8/23/20 [id number]  Cognitive Concerns/ Orientation : HECTOR -nonverbal at baseline; calm and cooperative  BEHAVIOR & AGGRESSION TOOL COLOR: Green   CIWA SCORE: N/A   ABNL VS/O2: VSS on RA; continuous pulse ox   MOBILITY: total care; turn and repo q2h  PAIN MANAGMENT: No non-verbal indicators of pain  DIET: Strict NPO; tube feedings 100 mL/hr and 200 mL flushes q4h (8a.m.-8 p.m.)  BOWEL/BLADDER: BS active x 4; incontinent   ABNL LAB/BG:  and 81, Hgb 10.9  DRAIN/DEVICES: L PIV SL, IV antibiotics; GJ tube, tube feeding to jejunum port, gastic open to drainage;  external male catheter   TELEMETRY RHYTHM: NA  SKIN: Scattered bruising; redness on L. Arm (encouraged elevation on pillow); redness to coccyx (mepilex CDI)  TESTS/PROCEDURES: N/A  D/C DAY/GOALS/PLACE: Possibly today  OTHER IMPORTANT INFO: New order to place gastric port at gravity drainage. Need appropriate connector from IR, will need to call IR today when opens. Right now, a glove is around it and secured with rubber band. Had 60 ml of drainage.

## 2020-08-24 NOTE — PLAN OF CARE
DATE & TIME: 8/23/20  [id number]  Cognitive Concerns/ Orientation : HECTOR -nonverbal at baseline; calm and cooperative  BEHAVIOR & AGGRESSION TOOL COLOR: Green   CIWA SCORE: N/A   ABNL VS/O2: VSS on RA; continuous pulse ox   MOBILITY: total care; turn and repo q2h  PAIN MANAGMENT: No non-verbal indicators of pain  DIET: Strict NPO; tube feedings 100 mL/hr and 200 mL flushes q4h (8a.m.-8 p.m.)  BOWEL/BLADDER: BS active x 4; incontinent   ABNL LAB/BG:  and 276, Hgb 10.9  DRAIN/DEVICES: L PIV SL, IV antibiotics; GJ tube, tube feeding to jejunum port, gastic open to drainage;  external male catheter   TELEMETRY RHYTHM: discontinued   SKIN: Scattered bruising; redness on L. Arm (encouraged elevation on pillow); redness to coccyx (mepilex CDI)  TESTS/PROCEDURES: N/A  D/C DAY/GOALS/PLACE: Possibly tomorrow   OTHER IMPORTANT INFO: New order to place gastric port at gravity drainage. Need appropriate connector from IR, will need to call IR tomorrow when opens. Right now, a glove is around it and secured with rubber band. Had 60 ml of drainage, tan colored.

## 2020-08-24 NOTE — DISCHARGE INSTRUCTIONS
See above instructions.    Some missing info on above - tube feedings at 100cc/hr start at 8 am and stop at 8 pm.    Water flushes around the clock 200 ml every 4 hours with 60 ml before and after feedings.      Regarding gastric port.  Drainage bag is attached and just screws on.  To empty, open clamp and unscrew end.  If you need to clamp the port due to drainage as described above, you will need to remove bag and close port.  An extension set with a  is sent with the belongings in case you need it.

## 2020-08-28 ENCOUNTER — APPOINTMENT (OUTPATIENT)
Dept: CT IMAGING | Facility: CLINIC | Age: 60
DRG: 871 | End: 2020-08-28
Attending: EMERGENCY MEDICINE
Payer: MEDICARE

## 2020-08-28 ENCOUNTER — APPOINTMENT (OUTPATIENT)
Dept: ULTRASOUND IMAGING | Facility: CLINIC | Age: 60
DRG: 871 | End: 2020-08-28
Attending: EMERGENCY MEDICINE
Payer: MEDICARE

## 2020-08-28 ENCOUNTER — HOSPITAL ENCOUNTER (INPATIENT)
Facility: CLINIC | Age: 60
LOS: 3 days | Discharge: HOME HEALTH | DRG: 871 | End: 2020-08-31
Attending: EMERGENCY MEDICINE | Admitting: INTERNAL MEDICINE
Payer: MEDICARE

## 2020-08-28 DIAGNOSIS — K80.20 CALCULUS OF GALLBLADDER WITHOUT CHOLECYSTITIS WITHOUT OBSTRUCTION: ICD-10-CM

## 2020-08-28 DIAGNOSIS — J69.0 ASPIRATION PNEUMONIA OF RIGHT LOWER LOBE, UNSPECIFIED ASPIRATION PNEUMONIA TYPE (H): ICD-10-CM

## 2020-08-28 DIAGNOSIS — K31.84 GASTROPARESIS: ICD-10-CM

## 2020-08-28 DIAGNOSIS — J69.0 ASPIRATION PNEUMONIA OF RIGHT MIDDLE LOBE, UNSPECIFIED ASPIRATION PNEUMONIA TYPE (H): Primary | ICD-10-CM

## 2020-08-28 DIAGNOSIS — E87.1 HYPONATREMIA: ICD-10-CM

## 2020-08-28 DIAGNOSIS — R50.9 FEBRILE ILLNESS: ICD-10-CM

## 2020-08-28 LAB
ALBUMIN SERPL-MCNC: 3.3 G/DL (ref 3.4–5)
ALBUMIN UR-MCNC: 10 MG/DL
ALP SERPL-CCNC: 131 U/L (ref 40–150)
ALT SERPL W P-5'-P-CCNC: 48 U/L (ref 0–70)
ANION GAP SERPL CALCULATED.3IONS-SCNC: 7 MMOL/L (ref 3–14)
APPEARANCE UR: CLEAR
AST SERPL W P-5'-P-CCNC: 27 U/L (ref 0–45)
BASOPHILS # BLD AUTO: 0.1 10E9/L (ref 0–0.2)
BASOPHILS NFR BLD AUTO: 0.5 %
BILIRUB SERPL-MCNC: 0.4 MG/DL (ref 0.2–1.3)
BILIRUB UR QL STRIP: NEGATIVE
BUN SERPL-MCNC: 19 MG/DL (ref 7–30)
CALCIUM SERPL-MCNC: 8.6 MG/DL (ref 8.5–10.1)
CHLORIDE SERPL-SCNC: 92 MMOL/L (ref 94–109)
CO2 SERPL-SCNC: 26 MMOL/L (ref 20–32)
COLOR UR AUTO: YELLOW
CREAT SERPL-MCNC: 0.89 MG/DL (ref 0.66–1.25)
DIFFERENTIAL METHOD BLD: ABNORMAL
EOSINOPHIL # BLD AUTO: 0.1 10E9/L (ref 0–0.7)
EOSINOPHIL NFR BLD AUTO: 0.9 %
ERYTHROCYTE [DISTWIDTH] IN BLOOD BY AUTOMATED COUNT: 13.6 % (ref 10–15)
GFR SERPL CREATININE-BSD FRML MDRD: >90 ML/MIN/{1.73_M2}
GLUCOSE SERPL-MCNC: 87 MG/DL (ref 70–99)
GLUCOSE UR STRIP-MCNC: NEGATIVE MG/DL
HCT VFR BLD AUTO: 40.4 % (ref 40–53)
HGB BLD-MCNC: 13.9 G/DL (ref 13.3–17.7)
HGB UR QL STRIP: NEGATIVE
IMM GRANULOCYTES # BLD: 0.1 10E9/L (ref 0–0.4)
IMM GRANULOCYTES NFR BLD: 0.5 %
KETONES UR STRIP-MCNC: NEGATIVE MG/DL
LABORATORY COMMENT REPORT: NORMAL
LACTATE BLD-SCNC: 0.9 MMOL/L (ref 0.7–2)
LACTATE BLD-SCNC: 1.4 MMOL/L (ref 0.7–2)
LEUKOCYTE ESTERASE UR QL STRIP: NEGATIVE
LIPASE SERPL-CCNC: 379 U/L (ref 73–393)
LYMPHOCYTES # BLD AUTO: 2.7 10E9/L (ref 0.8–5.3)
LYMPHOCYTES NFR BLD AUTO: 17.3 %
MCH RBC QN AUTO: 29.7 PG (ref 26.5–33)
MCHC RBC AUTO-ENTMCNC: 34.4 G/DL (ref 31.5–36.5)
MCV RBC AUTO: 86 FL (ref 78–100)
MONOCYTES # BLD AUTO: 1.7 10E9/L (ref 0–1.3)
MONOCYTES NFR BLD AUTO: 11.2 %
NEUTROPHILS # BLD AUTO: 10.8 10E9/L (ref 1.6–8.3)
NEUTROPHILS NFR BLD AUTO: 69.6 %
NITRATE UR QL: NEGATIVE
NRBC # BLD AUTO: 0 10*3/UL
NRBC BLD AUTO-RTO: 0 /100
OSMOLALITY UR: 225 MMOL/KG (ref 100–1200)
PH UR STRIP: 7.5 PH (ref 5–7)
PLATELET # BLD AUTO: 372 10E9/L (ref 150–450)
POTASSIUM SERPL-SCNC: 4 MMOL/L (ref 3.4–5.3)
PROT SERPL-MCNC: 9.1 G/DL (ref 6.8–8.8)
RBC # BLD AUTO: 4.68 10E12/L (ref 4.4–5.9)
RBC #/AREA URNS AUTO: 1 /HPF (ref 0–2)
SARS-COV-2 RNA SPEC QL NAA+PROBE: NEGATIVE
SARS-COV-2 RNA SPEC QL NAA+PROBE: NORMAL
SODIUM SERPL-SCNC: 125 MMOL/L (ref 133–144)
SODIUM SERPL-SCNC: 128 MMOL/L (ref 133–144)
SODIUM UR-SCNC: 7 MMOL/L
SOURCE: ABNORMAL
SP GR UR STRIP: 1.02 (ref 1–1.03)
SPECIMEN SOURCE: NORMAL
SPECIMEN SOURCE: NORMAL
SQUAMOUS #/AREA URNS AUTO: <1 /HPF (ref 0–1)
T4 FREE SERPL-MCNC: 1.39 NG/DL (ref 0.76–1.46)
UROBILINOGEN UR STRIP-MCNC: 8 MG/DL (ref 0–2)
WBC # BLD AUTO: 15.4 10E9/L (ref 4–11)
WBC #/AREA URNS AUTO: 0 /HPF (ref 0–5)

## 2020-08-28 PROCEDURE — 25000132 ZZH RX MED GY IP 250 OP 250 PS 637: Mod: GY | Performed by: EMERGENCY MEDICINE

## 2020-08-28 PROCEDURE — 25000128 H RX IP 250 OP 636: Performed by: INTERNAL MEDICINE

## 2020-08-28 PROCEDURE — 96361 HYDRATE IV INFUSION ADD-ON: CPT

## 2020-08-28 PROCEDURE — 87040 BLOOD CULTURE FOR BACTERIA: CPT | Performed by: EMERGENCY MEDICINE

## 2020-08-28 PROCEDURE — C9803 HOPD COVID-19 SPEC COLLECT: HCPCS

## 2020-08-28 PROCEDURE — 96365 THER/PROPH/DIAG IV INF INIT: CPT

## 2020-08-28 PROCEDURE — 25000128 H RX IP 250 OP 636: Performed by: EMERGENCY MEDICINE

## 2020-08-28 PROCEDURE — 87077 CULTURE AEROBIC IDENTIFY: CPT | Performed by: EMERGENCY MEDICINE

## 2020-08-28 PROCEDURE — 84295 ASSAY OF SERUM SODIUM: CPT | Performed by: INTERNAL MEDICINE

## 2020-08-28 PROCEDURE — 25000125 ZZHC RX 250: Performed by: INTERNAL MEDICINE

## 2020-08-28 PROCEDURE — 83935 ASSAY OF URINE OSMOLALITY: CPT | Performed by: INTERNAL MEDICINE

## 2020-08-28 PROCEDURE — 81001 URINALYSIS AUTO W/SCOPE: CPT | Performed by: EMERGENCY MEDICINE

## 2020-08-28 PROCEDURE — 83605 ASSAY OF LACTIC ACID: CPT | Performed by: EMERGENCY MEDICINE

## 2020-08-28 PROCEDURE — 27210429 ZZH NUTRITION PRODUCT INTERMEDIATE LITER

## 2020-08-28 PROCEDURE — 87800 DETECT AGNT MULT DNA DIREC: CPT | Performed by: EMERGENCY MEDICINE

## 2020-08-28 PROCEDURE — 25800030 ZZH RX IP 258 OP 636: Performed by: EMERGENCY MEDICINE

## 2020-08-28 PROCEDURE — 99285 EMERGENCY DEPT VISIT HI MDM: CPT | Mod: 25

## 2020-08-28 PROCEDURE — 99223 1ST HOSP IP/OBS HIGH 75: CPT | Mod: AI | Performed by: INTERNAL MEDICINE

## 2020-08-28 PROCEDURE — 84439 ASSAY OF FREE THYROXINE: CPT | Performed by: INTERNAL MEDICINE

## 2020-08-28 PROCEDURE — 94640 AIRWAY INHALATION TREATMENT: CPT

## 2020-08-28 PROCEDURE — 25000132 ZZH RX MED GY IP 250 OP 250 PS 637: Mod: GY | Performed by: INTERNAL MEDICINE

## 2020-08-28 PROCEDURE — 36415 COLL VENOUS BLD VENIPUNCTURE: CPT | Performed by: INTERNAL MEDICINE

## 2020-08-28 PROCEDURE — 83605 ASSAY OF LACTIC ACID: CPT | Performed by: INTERNAL MEDICINE

## 2020-08-28 PROCEDURE — 12000000 ZZH R&B MED SURG/OB

## 2020-08-28 PROCEDURE — 96375 TX/PRO/DX INJ NEW DRUG ADDON: CPT

## 2020-08-28 PROCEDURE — 76705 ECHO EXAM OF ABDOMEN: CPT

## 2020-08-28 PROCEDURE — 40000275 ZZH STATISTIC RCP TIME EA 10 MIN

## 2020-08-28 PROCEDURE — 83690 ASSAY OF LIPASE: CPT | Performed by: EMERGENCY MEDICINE

## 2020-08-28 PROCEDURE — 80053 COMPREHEN METABOLIC PANEL: CPT | Performed by: EMERGENCY MEDICINE

## 2020-08-28 PROCEDURE — 84300 ASSAY OF URINE SODIUM: CPT | Performed by: INTERNAL MEDICINE

## 2020-08-28 PROCEDURE — 71250 CT THORAX DX C-: CPT

## 2020-08-28 PROCEDURE — 96366 THER/PROPH/DIAG IV INF ADDON: CPT

## 2020-08-28 PROCEDURE — 85025 COMPLETE CBC W/AUTO DIFF WBC: CPT | Performed by: EMERGENCY MEDICINE

## 2020-08-28 PROCEDURE — 87186 SC STD MICRODIL/AGAR DIL: CPT | Performed by: EMERGENCY MEDICINE

## 2020-08-28 RX ORDER — BISACODYL 10 MG
10 SUPPOSITORY, RECTAL RECTAL DAILY PRN
Status: DISCONTINUED | OUTPATIENT
Start: 2020-08-28 | End: 2020-08-31 | Stop reason: HOSPADM

## 2020-08-28 RX ORDER — POTASSIUM CL/LIDO/0.9 % NACL 10MEQ/0.1L
10 INTRAVENOUS SOLUTION, PIGGYBACK (ML) INTRAVENOUS
Status: DISCONTINUED | OUTPATIENT
Start: 2020-08-28 | End: 2020-08-31 | Stop reason: HOSPADM

## 2020-08-28 RX ORDER — CARBAMAZEPINE 100 MG/5ML
150 SUSPENSION ORAL EVERY 6 HOURS
Status: DISCONTINUED | OUTPATIENT
Start: 2020-08-28 | End: 2020-08-31 | Stop reason: HOSPADM

## 2020-08-28 RX ORDER — ONDANSETRON 4 MG/1
4 TABLET, ORALLY DISINTEGRATING ORAL EVERY 6 HOURS PRN
Status: DISCONTINUED | OUTPATIENT
Start: 2020-08-28 | End: 2020-08-31 | Stop reason: HOSPADM

## 2020-08-28 RX ORDER — SODIUM BICARBONATE 650 MG/1
650 TABLET ORAL DAILY
Status: DISCONTINUED | OUTPATIENT
Start: 2020-08-29 | End: 2020-08-31 | Stop reason: HOSPADM

## 2020-08-28 RX ORDER — PIPERACILLIN SODIUM, TAZOBACTAM SODIUM 3; .375 G/15ML; G/15ML
3.38 INJECTION, POWDER, LYOPHILIZED, FOR SOLUTION INTRAVENOUS ONCE
Status: COMPLETED | OUTPATIENT
Start: 2020-08-28 | End: 2020-08-28

## 2020-08-28 RX ORDER — ACETYLCYSTEINE 200 MG/ML
2 SOLUTION ORAL; RESPIRATORY (INHALATION) 4 TIMES DAILY
Status: DISCONTINUED | OUTPATIENT
Start: 2020-08-28 | End: 2020-08-31 | Stop reason: HOSPADM

## 2020-08-28 RX ORDER — POTASSIUM CHLORIDE 1.5 G/1.58G
20-40 POWDER, FOR SOLUTION ORAL
Status: DISCONTINUED | OUTPATIENT
Start: 2020-08-28 | End: 2020-08-31 | Stop reason: HOSPADM

## 2020-08-28 RX ORDER — POTASSIUM CHLORIDE 1500 MG/1
20-40 TABLET, EXTENDED RELEASE ORAL
Status: DISCONTINUED | OUTPATIENT
Start: 2020-08-28 | End: 2020-08-31 | Stop reason: HOSPADM

## 2020-08-28 RX ORDER — PROCHLORPERAZINE MALEATE 5 MG
10 TABLET ORAL EVERY 6 HOURS PRN
Status: DISCONTINUED | OUTPATIENT
Start: 2020-08-28 | End: 2020-08-28

## 2020-08-28 RX ORDER — ACETAMINOPHEN 650 MG/1
650 SUPPOSITORY RECTAL ONCE
Status: COMPLETED | OUTPATIENT
Start: 2020-08-28 | End: 2020-08-28

## 2020-08-28 RX ORDER — POTASSIUM CHLORIDE 29.8 MG/ML
20 INJECTION INTRAVENOUS
Status: DISCONTINUED | OUTPATIENT
Start: 2020-08-28 | End: 2020-08-28 | Stop reason: CLARIF

## 2020-08-28 RX ORDER — ALBUTEROL SULFATE 0.83 MG/ML
2.5 SOLUTION RESPIRATORY (INHALATION)
Status: DISCONTINUED | OUTPATIENT
Start: 2020-08-28 | End: 2020-08-31 | Stop reason: HOSPADM

## 2020-08-28 RX ORDER — KETOROLAC TROMETHAMINE 15 MG/ML
15 INJECTION, SOLUTION INTRAMUSCULAR; INTRAVENOUS ONCE
Status: COMPLETED | OUTPATIENT
Start: 2020-08-28 | End: 2020-08-28

## 2020-08-28 RX ORDER — SCOPOLAMINE HYDROBROMIDE
0.8 POWDER (GRAM) MISCELLANEOUS 3 TIMES DAILY
Status: DISCONTINUED | OUTPATIENT
Start: 2020-08-28 | End: 2020-08-31 | Stop reason: HOSPADM

## 2020-08-28 RX ORDER — ONDANSETRON 2 MG/ML
4 INJECTION INTRAMUSCULAR; INTRAVENOUS EVERY 6 HOURS PRN
Status: DISCONTINUED | OUTPATIENT
Start: 2020-08-28 | End: 2020-08-31 | Stop reason: HOSPADM

## 2020-08-28 RX ORDER — SODIUM BICARBONATE 650 MG/1
650 TABLET ORAL DAILY
Status: DISCONTINUED | OUTPATIENT
Start: 2020-08-28 | End: 2020-08-28

## 2020-08-28 RX ORDER — NALOXONE HYDROCHLORIDE 0.4 MG/ML
.1-.4 INJECTION, SOLUTION INTRAMUSCULAR; INTRAVENOUS; SUBCUTANEOUS
Status: DISCONTINUED | OUTPATIENT
Start: 2020-08-28 | End: 2020-08-31 | Stop reason: HOSPADM

## 2020-08-28 RX ORDER — POTASSIUM CHLORIDE 7.45 MG/ML
10 INJECTION INTRAVENOUS
Status: DISCONTINUED | OUTPATIENT
Start: 2020-08-28 | End: 2020-08-31 | Stop reason: HOSPADM

## 2020-08-28 RX ORDER — LEVOTHYROXINE SODIUM 75 UG/1
150 TABLET ORAL EVERY MORNING
Status: DISCONTINUED | OUTPATIENT
Start: 2020-08-29 | End: 2020-08-31 | Stop reason: HOSPADM

## 2020-08-28 RX ORDER — PROCHLORPERAZINE 25 MG
25 SUPPOSITORY, RECTAL RECTAL EVERY 12 HOURS PRN
Status: DISCONTINUED | OUTPATIENT
Start: 2020-08-28 | End: 2020-08-28

## 2020-08-28 RX ORDER — POLYETHYLENE GLYCOL 3350 17 G/17G
17 POWDER, FOR SOLUTION ORAL DAILY PRN
Status: DISCONTINUED | OUTPATIENT
Start: 2020-08-28 | End: 2020-08-28

## 2020-08-28 RX ORDER — ZINC OXIDE
OINTMENT (GRAM) TOPICAL 2 TIMES DAILY PRN
Status: DISCONTINUED | OUTPATIENT
Start: 2020-08-28 | End: 2020-08-31 | Stop reason: HOSPADM

## 2020-08-28 RX ORDER — DEXTROSE MONOHYDRATE 100 MG/ML
INJECTION, SOLUTION INTRAVENOUS CONTINUOUS PRN
Status: DISCONTINUED | OUTPATIENT
Start: 2020-08-28 | End: 2020-08-31 | Stop reason: HOSPADM

## 2020-08-28 RX ORDER — ACETAMINOPHEN 650 MG/1
650 SUPPOSITORY RECTAL EVERY 4 HOURS PRN
Status: DISCONTINUED | OUTPATIENT
Start: 2020-08-28 | End: 2020-08-31 | Stop reason: HOSPADM

## 2020-08-28 RX ORDER — PROCHLORPERAZINE 25 MG
25 SUPPOSITORY, RECTAL RECTAL EVERY 12 HOURS PRN
Status: DISCONTINUED | OUTPATIENT
Start: 2020-08-28 | End: 2020-08-31 | Stop reason: HOSPADM

## 2020-08-28 RX ORDER — ASPIRIN 81 MG/1
81 TABLET, CHEWABLE ORAL DAILY
Status: DISCONTINUED | OUTPATIENT
Start: 2020-08-28 | End: 2020-08-31 | Stop reason: HOSPADM

## 2020-08-28 RX ORDER — AMOXICILLIN 250 MG
2 CAPSULE ORAL 2 TIMES DAILY PRN
Status: DISCONTINUED | OUTPATIENT
Start: 2020-08-28 | End: 2020-08-31 | Stop reason: HOSPADM

## 2020-08-28 RX ORDER — PIPERACILLIN SODIUM, TAZOBACTAM SODIUM 3; .375 G/15ML; G/15ML
3.38 INJECTION, POWDER, LYOPHILIZED, FOR SOLUTION INTRAVENOUS EVERY 6 HOURS
Status: DISCONTINUED | OUTPATIENT
Start: 2020-08-28 | End: 2020-08-30

## 2020-08-28 RX ORDER — ACETAMINOPHEN 325 MG/1
650 TABLET ORAL EVERY 4 HOURS PRN
Status: DISCONTINUED | OUTPATIENT
Start: 2020-08-28 | End: 2020-08-28

## 2020-08-28 RX ORDER — BACITRACIN ZINC 500 [USP'U]/G
OINTMENT TOPICAL DAILY PRN
Status: DISCONTINUED | OUTPATIENT
Start: 2020-08-28 | End: 2020-08-31 | Stop reason: HOSPADM

## 2020-08-28 RX ORDER — HYDROCORTISONE 20 MG/1
20 TABLET ORAL EVERY MORNING
Status: DISCONTINUED | OUTPATIENT
Start: 2020-08-28 | End: 2020-08-31 | Stop reason: HOSPADM

## 2020-08-28 RX ORDER — CHOLECALCIFEROL (VITAMIN D3) 50 MCG
50 TABLET ORAL DAILY
Status: DISCONTINUED | OUTPATIENT
Start: 2020-08-28 | End: 2020-08-31 | Stop reason: HOSPADM

## 2020-08-28 RX ORDER — PROCHLORPERAZINE MALEATE 5 MG
10 TABLET ORAL EVERY 6 HOURS PRN
Status: DISCONTINUED | OUTPATIENT
Start: 2020-08-28 | End: 2020-08-31 | Stop reason: HOSPADM

## 2020-08-28 RX ORDER — POLYETHYLENE GLYCOL 3350 17 G/17G
17 POWDER, FOR SOLUTION ORAL DAILY PRN
Status: DISCONTINUED | OUTPATIENT
Start: 2020-08-28 | End: 2020-08-31 | Stop reason: HOSPADM

## 2020-08-28 RX ORDER — SODIUM CHLORIDE AND POTASSIUM CHLORIDE 150; 900 MG/100ML; MG/100ML
INJECTION, SOLUTION INTRAVENOUS CONTINUOUS
Status: DISCONTINUED | OUTPATIENT
Start: 2020-08-28 | End: 2020-08-30

## 2020-08-28 RX ORDER — CALCIUM CARBONATE 1250 MG/5ML
1250 SUSPENSION ORAL
Status: DISCONTINUED | OUTPATIENT
Start: 2020-08-28 | End: 2020-08-28

## 2020-08-28 RX ORDER — HYDROCORTISONE 10 MG/1
10 TABLET ORAL
Status: DISCONTINUED | OUTPATIENT
Start: 2020-08-28 | End: 2020-08-31 | Stop reason: HOSPADM

## 2020-08-28 RX ORDER — AMOXICILLIN 250 MG
1 CAPSULE ORAL 2 TIMES DAILY PRN
Status: DISCONTINUED | OUTPATIENT
Start: 2020-08-28 | End: 2020-08-31 | Stop reason: HOSPADM

## 2020-08-28 RX ORDER — METOCLOPRAMIDE HYDROCHLORIDE 5 MG/5ML
5 SOLUTION ORAL
Status: DISCONTINUED | OUTPATIENT
Start: 2020-08-28 | End: 2020-08-30

## 2020-08-28 RX ORDER — GUAR GUM
1 PACKET (EA) ORAL DAILY
Status: DISCONTINUED | OUTPATIENT
Start: 2020-08-28 | End: 2020-08-31 | Stop reason: HOSPADM

## 2020-08-28 RX ORDER — CALCIUM CARBONATE 1250 MG/5ML
1250 SUSPENSION ORAL
Status: DISCONTINUED | OUTPATIENT
Start: 2020-08-28 | End: 2020-08-31 | Stop reason: HOSPADM

## 2020-08-28 RX ADMIN — PIPERACILLIN SODIUM AND TAZOBACTAM SODIUM 3.38 G: 3; .375 INJECTION, POWDER, LYOPHILIZED, FOR SOLUTION INTRAVENOUS at 18:13

## 2020-08-28 RX ADMIN — ACETAMINOPHEN 650 MG: 325 SUSPENSION ORAL at 21:56

## 2020-08-28 RX ADMIN — MINERAL SUPPLEMENT IRON 300 MG / 5 ML STRENGTH LIQUID 100 PER BOX UNFLAVORED 300 MG: at 14:18

## 2020-08-28 RX ADMIN — ACETAMINOPHEN 650 MG: 650 SUPPOSITORY RECTAL at 09:33

## 2020-08-28 RX ADMIN — ACETAMINOPHEN 650 MG: 650 SUPPOSITORY RECTAL at 13:03

## 2020-08-28 RX ADMIN — HYDROCORTISONE SODIUM SUCCINATE 50 MG: 100 INJECTION, POWDER, FOR SOLUTION INTRAMUSCULAR; INTRAVENOUS at 11:10

## 2020-08-28 RX ADMIN — HYDROCORTISONE 10 MG: 10 TABLET ORAL at 16:30

## 2020-08-28 RX ADMIN — METOCLOPRAMIDE 5 MG: 5 SOLUTION ORAL at 18:13

## 2020-08-28 RX ADMIN — BRIVARACETAM 100 MG: 10 SOLUTION ORAL at 14:16

## 2020-08-28 RX ADMIN — POTASSIUM & SODIUM PHOSPHATES POWDER PACK 280-160-250 MG 1 PACKET: 280-160-250 PACK at 21:56

## 2020-08-28 RX ADMIN — POTASSIUM CHLORIDE AND SODIUM CHLORIDE: 900; 150 INJECTION, SOLUTION INTRAVENOUS at 13:00

## 2020-08-28 RX ADMIN — PIPERACILLIN SODIUM AND TAZOBACTAM SODIUM 3.38 G: 3; .375 INJECTION, POWDER, LYOPHILIZED, FOR SOLUTION INTRAVENOUS at 13:02

## 2020-08-28 RX ADMIN — SODIUM CHLORIDE 1000 ML: 9 INJECTION, SOLUTION INTRAVENOUS at 06:10

## 2020-08-28 RX ADMIN — POTASSIUM & SODIUM PHOSPHATES POWDER PACK 280-160-250 MG 1 PACKET: 280-160-250 PACK at 16:30

## 2020-08-28 RX ADMIN — SODIUM CHLORIDE 1000 ML: 9 INJECTION, SOLUTION INTRAVENOUS at 03:13

## 2020-08-28 RX ADMIN — KETOROLAC TROMETHAMINE 15 MG: 15 INJECTION, SOLUTION INTRAMUSCULAR; INTRAVENOUS at 04:43

## 2020-08-28 RX ADMIN — Medication 50 MCG: at 14:19

## 2020-08-28 RX ADMIN — CARBAMAZEPINE 150 MG: 100 SUSPENSION ORAL at 14:15

## 2020-08-28 RX ADMIN — ACETAMINOPHEN 650 MG: 650 SUPPOSITORY RECTAL at 03:09

## 2020-08-28 RX ADMIN — HYDROCORTISONE 20 MG: 20 TABLET ORAL at 14:16

## 2020-08-28 RX ADMIN — BRIVARACETAM 100 MG: 10 SOLUTION ORAL at 21:56

## 2020-08-28 RX ADMIN — Medication 1 PACKET: at 16:32

## 2020-08-28 RX ADMIN — SODIUM BICARBONATE 650 MG TABLET 650 MG: at 14:19

## 2020-08-28 RX ADMIN — CALCIUM CARBONATE 1250 MG: 1250 SUSPENSION ORAL at 17:30

## 2020-08-28 RX ADMIN — PANTOPRAZOLE SODIUM 40 MG: 40 TABLET, DELAYED RELEASE ORAL at 14:15

## 2020-08-28 RX ADMIN — CARBAMAZEPINE 150 MG: 100 SUSPENSION ORAL at 17:30

## 2020-08-28 RX ADMIN — ALBUTEROL SULFATE 2.5 MG: 2.5 SOLUTION RESPIRATORY (INHALATION) at 19:20

## 2020-08-28 RX ADMIN — CALCIUM CARBONATE 1250 MG: 1250 SUSPENSION ORAL at 14:15

## 2020-08-28 RX ADMIN — PIPERACILLIN SODIUM AND TAZOBACTAM SODIUM 3.38 G: 3; .375 INJECTION, POWDER, LYOPHILIZED, FOR SOLUTION INTRAVENOUS at 23:57

## 2020-08-28 RX ADMIN — PIPERACILLIN SODIUM AND TAZOBACTAM SODIUM 3.38 G: 3; .375 INJECTION, POWDER, LYOPHILIZED, FOR SOLUTION INTRAVENOUS at 04:45

## 2020-08-28 RX ADMIN — ACETYLCYSTEINE 2 ML: 200 SOLUTION ORAL; RESPIRATORY (INHALATION) at 19:22

## 2020-08-28 RX ADMIN — ASPIRIN 81 MG 81 MG: 81 TABLET ORAL at 14:19

## 2020-08-28 ASSESSMENT — ACTIVITIES OF DAILY LIVING (ADL)
ADLS_ACUITY_SCORE: 44
ADLS_ACUITY_SCORE: 42

## 2020-08-28 ASSESSMENT — ENCOUNTER SYMPTOMS: FEVER: 1

## 2020-08-28 NOTE — PROVIDER NOTIFICATION
[facility] Note    Consult received, chart reviewed  Pt recently seen by Dr Balderrama with [facility] earlier this month  [facility] notified, please change GI consult to Dr Balderrama with [facility]  Please call with any questions.    Washington Ernst PA-C  [facility]  [phone] Cell ([phone])  [phone] Office (after 1300)
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
## 2020-08-28 NOTE — PLAN OF CARE
Admission    Patient arrives to room 622-2 via cart from ED.  Care plan note:   DATE & TIME: 8/28/20 4598-4393   Cognitive Concerns/ Orientation : HECTOR, nonverbal at baseline  BEHAVIOR & AGGRESSION TOOL COLOR: Green  CIWA SCORE: NA   ABNL VS/O2: BP soft 88/54, 96/50 Temp 101.6, recheck 99.7, received prn rectal Tylenol x1. O2 low 90s RA  MOBILITY: Up with lift, Hx of TBI with spastic hemiplegia. Turn/repo q2h.   PAIN MANAGMENT: no nonverbal indicators of pain  DIET: NPO, orders in place to start tube feeding  BOWEL/BLADDER: incontinent of bowel and bladder. Loose BM x1. External catheter in place.   ABNL LAB/BG: WBC 15.4, Na 128  DRAIN/DEVICES: GJ tube, J tube clamped, G tube connected to gravity bag (550 ml output), PIV access x1 infusing IVF infusing 100 mL/hr   TELEMETRY RHYTHM: NA  SKIN: coccyx pressure ulcer, applied mepilex. Redness/rash around groin and Rt hip   TESTS/PROCEDURES: none   D/C DAY/GOALS/PLACE: TBA  OTHER IMPORTANT INFO: ED admit at 1230. 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 Sepsis alert fired, lactic acid 0.9. on IV Zosyn q6h. GI following. Nutrition consult in place to resume TF. Covid 19 negative.   MD/RN ROUNDING SIGNED OFF D/E SHIFT: Yes  COMMIT TO SIT DONE AND SIGNED OFF Yes        Inpatient nursing criteria listed below were met:    PCD's Documented: Yes  Skin issues/needs documented :Yes  Isolation education started/completed Yes  Patient allergies verified with patient: Yes  Verified completion of [place] Risk Assessment Tool:  Yes  Verified completion of Guardianship screening tool: Yes  Fall Prevention: Care plan updated, Education given and documented Yes  Care Plan initiated: Yes  Home medications documented in belongings flowsheet: Yes  Patient belongings documented in belongings flowsheet: Yes  Reminder note (belongings/ medications) placed in discharge instructions:Yes  Admission profile/ required documentation complete: No  Bedside Report Letter given and explained to  patient Yes

## 2020-08-28 NOTE — ED NOTES
[facility]  ED Nurse Handoff Report    ED Chief complaint: Fever      ED Diagnosis:   Final diagnoses:   None       Code Status: Discuss upon admision    Allergies:   Allergies   Allergen Reactions     Dilantin [Phenytoin Sodium]      Scopolamine Hives     Hives in response to scopolamine PATCH      Valproic Acid      Toxicity c bone marrow suspension, elevated ammonia levels        Patient Story: Pt mother noted temp to be 103.1 and called EMS. Pt has Hx of recurrent aspiration pneumonia. Pt discharged this week for same diagnosis.    Focused Assessment:  Temp of 104.1. Pt appears to be at baseline. No cough noted. Pt non verbal     Treatments and/or interventions provided: Zosyn, tylenol, toradol, IVF  Patient's response to treatments and/or interventions: Decrease in fever    To be done/followed up on inpatient unit:  See inpatient orders    Does this patient have any cognitive concerns?: Disoriented to time, Disoriented to place, Disoriented to situation and Disorientation to person    Activity level - Baseline/Home:  Total Care  Activity Level - Current:   Total Care    Patient's Preferred language: English   Needed?: Yes    Isolation: Contact  and Other: COVID 19 special preciautions  Infection: MRSA  VRE  COVID swab pending  Bariatric?: No    Vital Signs:   Vitals:    08/28/20 0428 08/28/20 0430 08/28/20 0445 08/28/20 0500   BP:  116/62  97/58   Pulse:  104  105   Resp:       Temp: 103.1  F (39.5  C)      TempSrc: Rectal      SpO2:  97% 95% 93%       Cardiac Rhythm:     Was the PSS-3 completed:   Yes  What interventions are required if any?               Family Comments: No family present  OBS brochure/video discussed/provided to patient/family: N/A             For the majority of the shift this patient's behavior was Green.   Behavioral interventions performed were None.    ED NURSE PHONE NUMBER: [phone]
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
## 2020-08-28 NOTE — CONSULTS
CLINICAL NUTRITION SERVICES  -  ASSESSMENT NOTE      Recommendations Ordered by Registered Dietitian (RD): After discussion with Dr. Santiago, plan to resume low drip TF and advance slowly to continuous TF goal of Isosource 1.5 at 50 mL/hr --> 1800 kcal, 82 g protein (1.1 g/kg and 91% needs), 211 g CHO, 18 g fiber, 912 mL H2O   Also NutriSource Fiber 1 pkt daily --> 3 g fiber, 15 kcal   Total = 1815 kcal (24 kcal/kg), 21 g fiber   Flushes 60 mL every 4 hours as patient on IVF and with Na of 125 (L)   Future/Additional Recommendations: Also discussed long term plan as patient continues to aspirate despite FT being in the [place].    Could consider low drip TF (ie. 10 mL/hr) to maintain gut integrity and then supplement with TPN in the future.    Malnutrition: % Weight Loss:  > 2% in 1 week (severe malnutrition)  % Intake:  Decreased intake does not meet criteria for malnutrition - has been on home TF   Subcutaneous Fat Loss:  Unable to eval but suspected  Muscle Loss:  Unable to eval but suspected   Fluid Retention:  None noted    Malnutrition Diagnosis: Unable to determine due to inability to perform a Nutrition Focused Physical Exam         REASON FOR ASSESSMENT  Keyon Farias is a 60 year old male seen by Registered Dietitian for Provider Order - Resume tube feeds, note recurrent aspiration. Can we resume at slower rate. You can call me at [phone]. (Dr. Leticia Santiago).      NUTRITION HISTORY  - Information obtained from [facility] as patient is very well known to our services.  Patient was actually just discharged 4 days ago on the following regimen:    Type of Feeding Tube: J-tube   Enteral Frequency:  Cyclic   Enteral Regimen: Isosource 1.5 at 100 mL/hr x 12 hours (8am - 8pm)   Total Enteral Provisions: 1800 kcal, 82 g protein (1.1 g/kg and 91% needs), 211 g CHO, 18 g fiber, 912 mL H2O   Free Water Flush: 200 mL every 4 hours + 60 mL before and after each cycle   Also receiving NutriSource Fiber 1 pkt daily = 3 g  "fiber, 15 kcal   Total = 1815 kcal (24 kcal/kg)   TOTAL FLUID (TF + flushes) = 2232 mL    He presents with recurrent aspiration pneumonia.       CURRENT NUTRITION ORDERS  Diet Order:     NPO   Plan to resume low dose TF today (continuously over nighttime)    Current Intake/Tolerance:  N/A      NUTRITION FOCUSED PHYSICAL ASSESSMENT FOR DIAGNOSING MALNUTRITION)  No:  Isolation for R/O COVID-19                Observed:    N/A    Obtained from Chart/Interdisciplinary Team:  \"Well developed, well nourished\"    ANTHROPOMETRICS  Height: 6'0\"  Weight: 69.9 kg (154#)(8/22)  BMI: 20.91 kg/m^2  Weight Status:  Normal BMI  IBW: 80.9 kg   % IBW: 86%  Weight History:   Wt Readings from Last 10 Encounters:   08/22/20 69.9 kg (154 lb 3.2 oz)   08/15/20 74.2 kg (163 lb 9.3 oz)   05/17/20 67.2 kg (148 lb 2.4 oz)   05/06/20 74.2 kg (163 lb 8 oz)   03/26/20 74.8 kg (165 lb)   02/21/20 70.4 kg (155 lb 3.3 oz)   02/12/20 72.9 kg (160 lb 11.5 oz)   01/30/20 74.8 kg (165 lb)   01/29/20 74.8 kg (165 lb)   01/02/20 71.7 kg (158 lb)   Weight typically fluctuates but is down 10# or 6% over the last week       LABS  Na 125 (L)    MEDICATIONS   mL/hr  NeutraPhos   NutriSource Fiber 1 pkt daily       ASSESSED NUTRITION NEEDS PER APPROVED PRACTICE GUIDELINES:    Dosing Weight 69.9 kg   Estimated Energy Needs: 3934-5366 kcals (25-30 Kcal/Kg)  Justification: maintenance, WC bound   Estimated Protein Needs:  grams protein (1.2-1.5 g pro/Kg)  Justification: hypercatabolism with acute illness  Estimated Fluid Needs: 2690-3644 mL (1 mL/Kcal)  Justification: maintenance    MALNUTRITION:  % Weight Loss:  > 2% in 1 week (severe malnutrition)  % Intake:  Decreased intake does not meet criteria for malnutrition - has been on home TF   Subcutaneous Fat Loss:  Unable to eval but suspected  Muscle Loss:  Unable to eval but suspected   Fluid Retention:  None noted    Malnutrition Diagnosis: Unable to determine due to inability to perform a Nutrition " Focused Physical Exam     NUTRITION DIAGNOSIS:  Inadequate enteral nutrition infusion related to plans to resume TF today as evidenced by meeting 0% needs       NUTRITION INTERVENTIONS  Recommendations / Nutrition Prescription  After discussion with Dr. Santiago, plan to resume low drip TF and advance slowly to continuous TF goal of Isosource 1.5 at 50 mL/hr --> 1800 kcal, 82 g protein (1.1 g/kg and 91% needs), 211 g CHO, 18 g fiber, 912 mL H2O   Also NutriSource Fiber 1 pkt daily --> 3 g fiber, 15 kcal   Total = 1815 kcal (24 kcal/kg), 21 g fiber   Flushes 60 mL every 4 hours as patient on IVF and with Na of 125 (L)    Also discussed long term plan as patient continues to aspirate despite FT being in the [place].    Could consider low drip TF (ie. 10 mL/hr) to maintain gut integrity and then supplement with TPN in the future.     Implementation  Nutrition education: Not appropriate at this time due to patient condition  EN Composition, EN Schedule, and Feeding Tube Flush:  Orders written to resume Isosource 1.5 at 10 mL/hr and increase every 12 hours by 10 mL to goal of 50 mL/hr as above.  Flushes as above.  Collaboration and Referral of Nutrition care:  Discussed plan with Dr. Santiago who was in agreement.     Nutrition Goals  Isosource 1.5 at 50 mL/hr + NutriSource Fiber once daily will meet % needs   Patient will tolerate Isosource 1.5 at 50 mL/hr without any recurrent aspiration (long term)    MONITORING AND EVALUATION:  Progress towards goals will be monitored and evaluated per protocol and Practice Guidelines    Swati Parrish RD, LD, CNSC   Clinical Dietitian - [facility]
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
## 2020-08-28 NOTE — ED NOTES
Bed: ED02  Expected date:   Expected time:   Means of arrival:   Comments:  edina1 60m fever, hx pneumonia eta 5

## 2020-08-28 NOTE — PHARMACY-ADMISSION MEDICATION HISTORY
Pharmacy Medication History  Admission medication history interview status for the 8/28/2020  admission is complete. See EPIC admission navigator for prior to admission medications     Medication history sources: mother Savannah, last discharge record  Medication history source reliability: Good  Adherence assessment: Good    Significant changes made to the medication list:  none      Additional medication history information:   Finished augmentin discharge med    Medication reconciliation completed by provider prior to medication history? No    Time spent in this activity: 30 minutes      Prior to Admission medications    Medication Sig Last Dose Taking? Auth Provider   acetylcysteine (MUCOMYST) 20 % neb solution Take 2 mLs by nebulization 4 times daily With albuterol at 0700, 1100, 1500, and 1900   Yes Unknown, Entered By History   albuterol (PROVENTIL) (5 MG/ML) 0.5% neb solution Take 2.5 mg by nebulization every 4 hours (while awake) 0700 1100 1500 1900 with mucomyst   Yes Unknown, Entered By History   aspirin (ASA) 81 MG chewable tablet 81 mg by Oral or Feeding Tube route daily At 0900  Yes Unknown, Entered By History   bacitracin ointment Apply topically daily as needed for wound care To PEG site.   Yes Unknown, Entered By History   Brivaracetam (BRIVIACT) 10 MG/ML solution 100 mg by Oral or Feeding Tube route 2 times daily 0900, 2100 8/27/2020 at 2100 Yes Unknown, Entered By History   calcium carbonate 1250 (500 CA) MG/5ML SUSP suspension 5 mLs (1,250 mg) by Per J Tube route 3 times daily (with meals)  Yes Mariana Venegas MD   carBAMazepine (TEGRETOL) 100 MG/5ML suspension 150 mg by Oral or Feeding Tube route every 6 hours At 06:00, 12:00, 18:00 and 24:00 for seizures  8/28/2020 at 0000 Yes Unknown, Entered By History   ferrous sulfate 220 (44 Fe) MG/5ML ELIX 220 mg by Per Feeding Tube route daily  Yes Unknown, Entered By History   Guar Gum (FIBER MODULAR, NUTRISOURCE FIBER,) packet 1 packet by Per G  Tube route daily  Yes tSarr Champion MD   hydrocortisone (CORTEF) 5 MG tablet 10 mg by Oral or FT or NG tube route daily (with dinner) At 1500  Yes Unknown, Entered By History   hydrocortisone (CORTEF) 5 MG tablet 20 mg by Oral or FT or NG tube route every morning   Yes Unknown, Entered By History   hydrocortisone 1 % CREA cream Place rectally 2 times daily as needed for other Apply to reddened memo areas as needed  Yes Unknown, Entered By History   levothyroxine (SYNTHROID/LEVOTHROID) 150 MCG tablet Take 150 mcg by mouth every morning  Yes Unknown, Entered By History   melatonin (MELATONIN) 1 MG/ML LIQD liquid 6 mg by Per NG tube route At Bedtime   Yes Unknown, Entered By History   metoclopramide (REGLAN) 5 MG/5ML solution 5 mg by Per Feeding Tube route 2 times daily  Yes Unknown, Entered By History   miconazole (MICATIN) 2 % AERP powder Apply topically 2 times daily as needed   Yes Unknown, Entered By History   Multiple Vitamins-Minerals (EMERGEN-C VITAMIN C) PACK 1 packet by Oral or Feeding Tube route daily Vitamin C 1000 mg  Yes Unknown, Entered By History   mupirocin (BACTROBAN) 2 % external ointment Apply topically 2 times daily as needed   Yes Reported, Patient   pantoprazole (PROTONIX) 2 mg/mL SUSP suspension 20 mLs (40 mg) by Per J Tube route daily  Yes Alvaro Barahona MD   potassium & sodium phosphates (NEUTRA-PHOS) 280-160-250 MG Packet Take 1 packet by mouth 3 times daily   Yes Yanely Liriano MD   prochlorperazine (COMPAZINE) 25 MG suppository Place 1 suppository (25 mg) rectally every 12 hours as needed for nausea or vomiting  Yes Alvaro Barahona MD   Scopolamine HBr POWD Dispense #90. Mix contents with small amount of water for admin via J-tube.  Administer 0.8 mg three times each day.  Yes Jennie Bermudez MD   Skin Protectants, Misc. (BALMEX SKIN PROTECTANT) OINT Externally apply topically 2 times daily as needed (irritation) Applay to reddened memo areas twice daily as needed  Yes  Unknown, Entered By History   sodium bicarbonate 650 MG tablet Take 1 tablet (650 mg) by mouth daily  Yes Ricky Torres MD   testosterone cypionate (DEPOTESTOTERONE CYPIONATE) 200 MG/ML injection Inject 76 mg into the muscle See Admin Instructions Every 2 weeks on Thursdays  76 mg or 0.38 mL unknown at Unknown time Yes Unknown, Entered By History   vitamin D3 (CHOLECALCIFEROL) 2000 units (50 mcg) tablet Take 2,000 Units by mouth daily Crush and feed via j-tube @@ 0900  Yes Unknown, Entered By History   order for DME Equipment being ordered: Nebulizer   Starr Champion MD

## 2020-08-28 NOTE — PLAN OF CARE
HECTOR orientation -nonverbal baseline. VSS on RA ex BP soft at times. A2/ repositioning. Incontinent, condom catheter on- draining well. G tube to gravity with brown colored output, J tube connected to tube feeding - started this evening at 1630 running at 10ml/hr. PIV infusing IVF, intermittent abx. Mepilex on coccyx CDI. Contact for MRSA/VRE. Discharge date pending.

## 2020-08-28 NOTE — ED TRIAGE NOTES
Pt recently discharged for pna. Last seen in ED Thurs 8/20. Pt mother states TMAX this morning of 103.3.

## 2020-08-29 ENCOUNTER — APPOINTMENT (OUTPATIENT)
Dept: GENERAL RADIOLOGY | Facility: CLINIC | Age: 60
DRG: 871 | End: 2020-08-29
Attending: INTERNAL MEDICINE
Payer: MEDICARE

## 2020-08-29 LAB
ANION GAP SERPL CALCULATED.3IONS-SCNC: 6 MMOL/L (ref 3–14)
BUN SERPL-MCNC: 13 MG/DL (ref 7–30)
CALCIUM SERPL-MCNC: 7.9 MG/DL (ref 8.5–10.1)
CHLORIDE SERPL-SCNC: 108 MMOL/L (ref 94–109)
CO2 SERPL-SCNC: 24 MMOL/L (ref 20–32)
CREAT SERPL-MCNC: 0.93 MG/DL (ref 0.66–1.25)
ERYTHROCYTE [DISTWIDTH] IN BLOOD BY AUTOMATED COUNT: 14.2 % (ref 10–15)
GFR SERPL CREATININE-BSD FRML MDRD: >90 ML/MIN/{1.73_M2}
GLUCOSE SERPL-MCNC: 112 MG/DL (ref 70–99)
HCT VFR BLD AUTO: 32.4 % (ref 40–53)
HGB BLD-MCNC: 10.8 G/DL (ref 13.3–17.7)
MCH RBC QN AUTO: 29.2 PG (ref 26.5–33)
MCHC RBC AUTO-ENTMCNC: 33.3 G/DL (ref 31.5–36.5)
MCV RBC AUTO: 88 FL (ref 78–100)
PLATELET # BLD AUTO: 260 10E9/L (ref 150–450)
POTASSIUM SERPL-SCNC: 3.8 MMOL/L (ref 3.4–5.3)
RBC # BLD AUTO: 3.7 10E12/L (ref 4.4–5.9)
SODIUM SERPL-SCNC: 138 MMOL/L (ref 133–144)
WBC # BLD AUTO: 12.9 10E9/L (ref 4–11)

## 2020-08-29 PROCEDURE — 40000986 XR ABDOMEN PORT 1 VW

## 2020-08-29 PROCEDURE — 36415 COLL VENOUS BLD VENIPUNCTURE: CPT | Performed by: INTERNAL MEDICINE

## 2020-08-29 PROCEDURE — 40000275 ZZH STATISTIC RCP TIME EA 10 MIN

## 2020-08-29 PROCEDURE — 25000128 H RX IP 250 OP 636: Performed by: INTERNAL MEDICINE

## 2020-08-29 PROCEDURE — 25000125 ZZHC RX 250: Performed by: INTERNAL MEDICINE

## 2020-08-29 PROCEDURE — 80048 BASIC METABOLIC PNL TOTAL CA: CPT | Performed by: INTERNAL MEDICINE

## 2020-08-29 PROCEDURE — 85027 COMPLETE CBC AUTOMATED: CPT | Performed by: INTERNAL MEDICINE

## 2020-08-29 PROCEDURE — 94640 AIRWAY INHALATION TREATMENT: CPT | Mod: 76

## 2020-08-29 PROCEDURE — 27210429 ZZH NUTRITION PRODUCT INTERMEDIATE LITER

## 2020-08-29 PROCEDURE — 12000000 ZZH R&B MED SURG/OB

## 2020-08-29 PROCEDURE — 99232 SBSQ HOSP IP/OBS MODERATE 35: CPT | Performed by: INTERNAL MEDICINE

## 2020-08-29 PROCEDURE — 94640 AIRWAY INHALATION TREATMENT: CPT

## 2020-08-29 PROCEDURE — 25000132 ZZH RX MED GY IP 250 OP 250 PS 637: Mod: GY | Performed by: INTERNAL MEDICINE

## 2020-08-29 RX ADMIN — ASPIRIN 81 MG 81 MG: 81 TABLET ORAL at 09:45

## 2020-08-29 RX ADMIN — SODIUM BICARBONATE 650 MG TABLET 650 MG: at 09:45

## 2020-08-29 RX ADMIN — ACETYLCYSTEINE 2 ML: 200 SOLUTION ORAL; RESPIRATORY (INHALATION) at 19:23

## 2020-08-29 RX ADMIN — BRIVARACETAM 100 MG: 10 SOLUTION ORAL at 20:52

## 2020-08-29 RX ADMIN — PIPERACILLIN SODIUM AND TAZOBACTAM SODIUM 3.38 G: 3; .375 INJECTION, POWDER, LYOPHILIZED, FOR SOLUTION INTRAVENOUS at 12:07

## 2020-08-29 RX ADMIN — ACETYLCYSTEINE 2 ML: 200 SOLUTION ORAL; RESPIRATORY (INHALATION) at 14:45

## 2020-08-29 RX ADMIN — PIPERACILLIN SODIUM AND TAZOBACTAM SODIUM 3.38 G: 3; .375 INJECTION, POWDER, LYOPHILIZED, FOR SOLUTION INTRAVENOUS at 07:00

## 2020-08-29 RX ADMIN — ALBUTEROL SULFATE 2.5 MG: 2.5 SOLUTION RESPIRATORY (INHALATION) at 07:18

## 2020-08-29 RX ADMIN — Medication 0.8 MG: at 16:24

## 2020-08-29 RX ADMIN — ALBUTEROL SULFATE 2.5 MG: 2.5 SOLUTION RESPIRATORY (INHALATION) at 10:51

## 2020-08-29 RX ADMIN — CARBAMAZEPINE 150 MG: 100 SUSPENSION ORAL at 18:11

## 2020-08-29 RX ADMIN — ACETYLCYSTEINE 2 ML: 200 SOLUTION ORAL; RESPIRATORY (INHALATION) at 10:51

## 2020-08-29 RX ADMIN — CALCIUM CARBONATE 1250 MG: 1250 SUSPENSION ORAL at 09:55

## 2020-08-29 RX ADMIN — LEVOTHYROXINE SODIUM 150 MCG: 75 TABLET ORAL at 07:00

## 2020-08-29 RX ADMIN — HYDROCORTISONE 20 MG: 20 TABLET ORAL at 09:45

## 2020-08-29 RX ADMIN — Medication 0.8 MG: at 21:45

## 2020-08-29 RX ADMIN — Medication 50 MCG: at 09:45

## 2020-08-29 RX ADMIN — BRIVARACETAM 100 MG: 10 SOLUTION ORAL at 09:53

## 2020-08-29 RX ADMIN — POTASSIUM & SODIUM PHOSPHATES POWDER PACK 280-160-250 MG 1 PACKET: 280-160-250 PACK at 09:46

## 2020-08-29 RX ADMIN — HYDROCORTISONE 10 MG: 10 TABLET ORAL at 16:23

## 2020-08-29 RX ADMIN — ACETYLCYSTEINE 2 ML: 200 SOLUTION ORAL; RESPIRATORY (INHALATION) at 07:18

## 2020-08-29 RX ADMIN — PANTOPRAZOLE SODIUM 40 MG: 40 TABLET, DELAYED RELEASE ORAL at 09:51

## 2020-08-29 RX ADMIN — PIPERACILLIN SODIUM AND TAZOBACTAM SODIUM 3.38 G: 3; .375 INJECTION, POWDER, LYOPHILIZED, FOR SOLUTION INTRAVENOUS at 18:11

## 2020-08-29 RX ADMIN — ALBUTEROL SULFATE 2.5 MG: 2.5 SOLUTION RESPIRATORY (INHALATION) at 14:45

## 2020-08-29 RX ADMIN — POTASSIUM CHLORIDE AND SODIUM CHLORIDE: 900; 150 INJECTION, SOLUTION INTRAVENOUS at 18:17

## 2020-08-29 RX ADMIN — ALBUTEROL SULFATE 2.5 MG: 2.5 SOLUTION RESPIRATORY (INHALATION) at 23:01

## 2020-08-29 RX ADMIN — METOCLOPRAMIDE 5 MG: 5 SOLUTION ORAL at 07:00

## 2020-08-29 RX ADMIN — CARBAMAZEPINE 150 MG: 100 SUSPENSION ORAL at 12:25

## 2020-08-29 RX ADMIN — CARBAMAZEPINE 150 MG: 100 SUSPENSION ORAL at 00:00

## 2020-08-29 RX ADMIN — ALBUTEROL SULFATE 2.5 MG: 2.5 SOLUTION RESPIRATORY (INHALATION) at 19:23

## 2020-08-29 RX ADMIN — METOCLOPRAMIDE 5 MG: 5 SOLUTION ORAL at 16:23

## 2020-08-29 RX ADMIN — CALCIUM CARBONATE 1250 MG: 1250 SUSPENSION ORAL at 18:11

## 2020-08-29 RX ADMIN — MINERAL SUPPLEMENT IRON 300 MG / 5 ML STRENGTH LIQUID 100 PER BOX UNFLAVORED 300 MG: at 15:07

## 2020-08-29 RX ADMIN — POTASSIUM & SODIUM PHOSPHATES POWDER PACK 280-160-250 MG 1 PACKET: 280-160-250 PACK at 16:23

## 2020-08-29 RX ADMIN — CARBAMAZEPINE 150 MG: 100 SUSPENSION ORAL at 07:01

## 2020-08-29 RX ADMIN — POTASSIUM & SODIUM PHOSPHATES POWDER PACK 280-160-250 MG 1 PACKET: 280-160-250 PACK at 21:45

## 2020-08-29 RX ADMIN — CALCIUM CARBONATE 1250 MG: 1250 SUSPENSION ORAL at 12:25

## 2020-08-29 RX ADMIN — ACETAMINOPHEN 650 MG: 325 SUSPENSION ORAL at 07:00

## 2020-08-29 ASSESSMENT — ACTIVITIES OF DAILY LIVING (ADL)
ADLS_ACUITY_SCORE: 42
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
## 2020-08-29 NOTE — PROGRESS NOTES
"Paged and informed positive BCX Gram positive clusters; not yet speciated; I asked to be paged again as soon as Verigene testing is done in case he is found to have S aureus; plan otherwise to continue Zosyn for now    Addendum: Verigene result: \"POSITIVE for Staphylococci other than S.aureus, S.epidermidis and S.lugdunensis, by Verigene multiplex nucleic acid test. Coagulase-negative staphylococci are the most common venipuncture or collection associated skin CONTAMINANTS grown in blood cultures.   Final identification and antimicrobial susceptibility testing will be   verified by standard methods. \"    Plan to continue current care plan for now  "

## 2020-08-29 NOTE — PLAN OF CARE
HECTOR orientation -nonverbal baseline. VSS on RA ex BP soft at times. A2/ repositioning. Condom catheter for incontinence. G tube to gravity, J tube connected to tube feeding - Increased to 20 ml/hr @ 4am. Discharge date pending.

## 2020-08-29 NOTE — PROGRESS NOTES
Notified Dr. Santiago that we do not have the right supplies to attach intermittent suctioning to patients G-tube. Paged Dr. Davis with no response. Dr. Santiago said if we don't hear back from Dr. Davis that it is okay to manually suction/aspirate the G tube 2x a shift as this is primarily for diagnostic reasons. Notified primary RN, Patricia TIJERINA

## 2020-08-29 NOTE — PROVIDER NOTIFICATION
MD Notification    Notified Person: MD    Notified Person Name: Maurizio Garcia    Notification Date/Time: 8/29/20 @ 0300    Notification Interaction:     Purpose of Notification: Critical lab value of (+) BC from left arm on 8/28 @ 03:14 am. Results showed gram (+) cocci in clusters. ID shows staphylococcus species.    Orders Received: Continue zosyn for now.    Comments: 1 more f/u lab result pending that will likely come back 8/29 afternoon.

## 2020-08-29 NOTE — PLAN OF CARE
A&O-HECTOR. Bowel sounds audible, incontinent B/B, passing flatus-soft BM x2 this shift, condom catheter, tolerating TF at 20ml/hour and NPO diet. VSS. Dressing changed to coccyx. Turn and repo every two hours with assist x2. Discomfort with cleaning periarea. Pt scoring green on the Aggression Stop Light Tool. Plan pending.

## 2020-08-30 PROCEDURE — 12000000 ZZH R&B MED SURG/OB

## 2020-08-30 PROCEDURE — 25000125 ZZHC RX 250: Performed by: INTERNAL MEDICINE

## 2020-08-30 PROCEDURE — 99233 SBSQ HOSP IP/OBS HIGH 50: CPT | Performed by: INTERNAL MEDICINE

## 2020-08-30 PROCEDURE — 40000275 ZZH STATISTIC RCP TIME EA 10 MIN

## 2020-08-30 PROCEDURE — 27210429 ZZH NUTRITION PRODUCT INTERMEDIATE LITER

## 2020-08-30 PROCEDURE — 25000132 ZZH RX MED GY IP 250 OP 250 PS 637: Mod: GY | Performed by: INTERNAL MEDICINE

## 2020-08-30 PROCEDURE — 25000128 H RX IP 250 OP 636: Performed by: INTERNAL MEDICINE

## 2020-08-30 PROCEDURE — 94640 AIRWAY INHALATION TREATMENT: CPT

## 2020-08-30 PROCEDURE — 94640 AIRWAY INHALATION TREATMENT: CPT | Mod: 76

## 2020-08-30 RX ORDER — AMOXICILLIN AND CLAVULANATE POTASSIUM 400; 57 MG/5ML; MG/5ML
500 POWDER, FOR SUSPENSION ORAL EVERY 8 HOURS
Status: DISCONTINUED | OUTPATIENT
Start: 2020-08-30 | End: 2020-08-31

## 2020-08-30 RX ORDER — CLOTRIMAZOLE AND BETAMETHASONE DIPROPIONATE 10; .64 MG/G; MG/G
CREAM TOPICAL 2 TIMES DAILY
Status: DISCONTINUED | OUTPATIENT
Start: 2020-08-30 | End: 2020-08-31 | Stop reason: HOSPADM

## 2020-08-30 RX ORDER — METOCLOPRAMIDE HYDROCHLORIDE 5 MG/5ML
10 SOLUTION ORAL
Status: DISCONTINUED | OUTPATIENT
Start: 2020-08-30 | End: 2020-08-31 | Stop reason: HOSPADM

## 2020-08-30 RX ORDER — AZITHROMYCIN 200 MG/5ML
200 POWDER, FOR SUSPENSION ORAL DAILY
Status: DISCONTINUED | OUTPATIENT
Start: 2020-08-30 | End: 2020-08-31

## 2020-08-30 RX ORDER — TESTOSTERONE CYPIONATE 200 MG/ML
76 INJECTION, SOLUTION INTRAMUSCULAR SEE ADMIN INSTRUCTIONS
Status: DISCONTINUED | OUTPATIENT
Start: 2020-08-30 | End: 2020-08-31 | Stop reason: HOSPADM

## 2020-08-30 RX ADMIN — POTASSIUM & SODIUM PHOSPHATES POWDER PACK 280-160-250 MG 1 PACKET: 280-160-250 PACK at 15:58

## 2020-08-30 RX ADMIN — METOCLOPRAMIDE 10 MG: 5 SOLUTION ORAL at 17:11

## 2020-08-30 RX ADMIN — CALCIUM CARBONATE 1250 MG: 1250 SUSPENSION ORAL at 11:40

## 2020-08-30 RX ADMIN — PIPERACILLIN SODIUM AND TAZOBACTAM SODIUM 3.38 G: 3; .375 INJECTION, POWDER, LYOPHILIZED, FOR SOLUTION INTRAVENOUS at 11:40

## 2020-08-30 RX ADMIN — CARBAMAZEPINE 150 MG: 100 SUSPENSION ORAL at 07:06

## 2020-08-30 RX ADMIN — BRIVARACETAM 100 MG: 10 SOLUTION ORAL at 22:24

## 2020-08-30 RX ADMIN — MICONAZOLE NITRATE: 20 POWDER TOPICAL at 15:58

## 2020-08-30 RX ADMIN — CALCIUM CARBONATE 1250 MG: 1250 SUSPENSION ORAL at 17:11

## 2020-08-30 RX ADMIN — ASPIRIN 81 MG 81 MG: 81 TABLET ORAL at 08:29

## 2020-08-30 RX ADMIN — CARBAMAZEPINE 150 MG: 100 SUSPENSION ORAL at 00:46

## 2020-08-30 RX ADMIN — Medication 0.8 MG: at 22:26

## 2020-08-30 RX ADMIN — MINERAL SUPPLEMENT IRON 300 MG / 5 ML STRENGTH LIQUID 100 PER BOX UNFLAVORED 300 MG: at 13:24

## 2020-08-30 RX ADMIN — Medication 1 PACKET: at 08:28

## 2020-08-30 RX ADMIN — SODIUM BICARBONATE 650 MG TABLET 650 MG: at 08:29

## 2020-08-30 RX ADMIN — CARBAMAZEPINE 150 MG: 100 SUSPENSION ORAL at 17:11

## 2020-08-30 RX ADMIN — AMOXICILLIN AND CLAVULANATE POTASSIUM 500 MG: 400; 57 POWDER, FOR SUSPENSION ORAL at 22:25

## 2020-08-30 RX ADMIN — Medication 50 MCG: at 08:29

## 2020-08-30 RX ADMIN — ALBUTEROL SULFATE 2.5 MG: 2.5 SOLUTION RESPIRATORY (INHALATION) at 11:23

## 2020-08-30 RX ADMIN — METOCLOPRAMIDE 5 MG: 5 SOLUTION ORAL at 07:07

## 2020-08-30 RX ADMIN — ACETYLCYSTEINE 2 ML: 200 SOLUTION ORAL; RESPIRATORY (INHALATION) at 07:28

## 2020-08-30 RX ADMIN — PIPERACILLIN SODIUM AND TAZOBACTAM SODIUM 3.38 G: 3; .375 INJECTION, POWDER, LYOPHILIZED, FOR SOLUTION INTRAVENOUS at 01:13

## 2020-08-30 RX ADMIN — CALCIUM CARBONATE 1250 MG: 1250 SUSPENSION ORAL at 08:29

## 2020-08-30 RX ADMIN — MICONAZOLE NITRATE: 20 POWDER TOPICAL at 08:27

## 2020-08-30 RX ADMIN — POTASSIUM & SODIUM PHOSPHATES POWDER PACK 280-160-250 MG 1 PACKET: 280-160-250 PACK at 22:24

## 2020-08-30 RX ADMIN — ACETYLCYSTEINE 2 ML: 200 SOLUTION ORAL; RESPIRATORY (INHALATION) at 11:23

## 2020-08-30 RX ADMIN — AMOXICILLIN AND CLAVULANATE POTASSIUM 500 MG: 400; 57 POWDER, FOR SUSPENSION ORAL at 13:24

## 2020-08-30 RX ADMIN — LEVOTHYROXINE SODIUM 150 MCG: 75 TABLET ORAL at 07:06

## 2020-08-30 RX ADMIN — POTASSIUM & SODIUM PHOSPHATES POWDER PACK 280-160-250 MG 1 PACKET: 280-160-250 PACK at 08:29

## 2020-08-30 RX ADMIN — Medication 0.8 MG: at 08:27

## 2020-08-30 RX ADMIN — CLOTRIMAZOLE AND BETAMETHASONE DIPROPIONATE: 10; .5 CREAM TOPICAL at 13:25

## 2020-08-30 RX ADMIN — HYDROCORTISONE 20 MG: 20 TABLET ORAL at 08:29

## 2020-08-30 RX ADMIN — CARBAMAZEPINE 150 MG: 100 SUSPENSION ORAL at 11:40

## 2020-08-30 RX ADMIN — ACETYLCYSTEINE 2 ML: 200 SOLUTION ORAL; RESPIRATORY (INHALATION) at 14:59

## 2020-08-30 RX ADMIN — ACETYLCYSTEINE 2 ML: 200 SOLUTION ORAL; RESPIRATORY (INHALATION) at 19:37

## 2020-08-30 RX ADMIN — ALBUTEROL SULFATE 2.5 MG: 2.5 SOLUTION RESPIRATORY (INHALATION) at 19:37

## 2020-08-30 RX ADMIN — HYDROCORTISONE 10 MG: 10 TABLET ORAL at 16:03

## 2020-08-30 RX ADMIN — CLOTRIMAZOLE AND BETAMETHASONE DIPROPIONATE: 10; .5 CREAM TOPICAL at 22:26

## 2020-08-30 RX ADMIN — Medication 0.8 MG: at 15:59

## 2020-08-30 RX ADMIN — PANTOPRAZOLE SODIUM 40 MG: 40 TABLET, DELAYED RELEASE ORAL at 08:27

## 2020-08-30 RX ADMIN — AZITHROMYCIN 200 MG: 200 POWDER, FOR SUSPENSION ORAL at 16:02

## 2020-08-30 RX ADMIN — PIPERACILLIN SODIUM AND TAZOBACTAM SODIUM 3.38 G: 3; .375 INJECTION, POWDER, LYOPHILIZED, FOR SOLUTION INTRAVENOUS at 06:58

## 2020-08-30 RX ADMIN — BRIVARACETAM 100 MG: 10 SOLUTION ORAL at 09:37

## 2020-08-30 RX ADMIN — ALBUTEROL SULFATE 2.5 MG: 2.5 SOLUTION RESPIRATORY (INHALATION) at 14:59

## 2020-08-30 RX ADMIN — METOCLOPRAMIDE 10 MG: 5 SOLUTION ORAL at 22:24

## 2020-08-30 RX ADMIN — ALBUTEROL SULFATE 2.5 MG: 2.5 SOLUTION RESPIRATORY (INHALATION) at 07:28

## 2020-08-30 RX ADMIN — POTASSIUM CHLORIDE AND SODIUM CHLORIDE: 900; 150 INJECTION, SOLUTION INTRAVENOUS at 08:30

## 2020-08-30 ASSESSMENT — ACTIVITIES OF DAILY LIVING (ADL)
ADLS_ACUITY_SCORE: 44
ADLS_ACUITY_SCORE: 42
ADLS_ACUITY_SCORE: 44

## 2020-08-30 NOTE — PROGRESS NOTES
Patient is overall stable.  Patient is tolerating tube feeding.  Patient has between 70 to 300 cc of bilious output in the G port no tube feeding coming back tube position appears in the jejunum.  Continue on tube feeding as before.  Patient findings are more suggestive of possible gastroparesis and biliary secretions causing vomiting versus aspiration.  I will recommend to keep the head of the bed elevated at 30 degrees.  I will increase the dose of Reglan to 10 mg 4 times a day and also start him on azithromycin 200 mg daily for gastroparesis to improve gastric output.  Also I will recommend to decompress stomach once a shift by leaving gastric port to gravity.    Beny Davis GI

## 2020-08-30 NOTE — PROGRESS NOTES
[facility]  Gastroenterology Progress Note     Keyon Farias MRN# [id number]   YOB: 1960 Age: 60 year old          Assessment and Plan:     Aspiration pneumonia (H)  Clinically patient is unchanged patient is getting neb treatments patient is responding to verbal command patient is tolerating his tube feeding.  Patient's recurrent bouts of nausea and vomiting episodes were discussed in detail with hospitalist team.  I will recommend the patient to have KUB to check placement of G-tube and J-tube feeding patient has a G port which can be used for decompression of the stomach.  I will recommend the patient to have residual gastric contents checked at the end of every shift.  If patient has regurgitation of tube feeding into the stomach from small bowel patient may benefit from repositioning of J-tube or new G-tube further down into the bowel to avoid any regurgitation of small bowel contents into the stomach leading to vomiting.  However I strongly suspect patient's symptoms of recurrent aspirations are from oral secretions and also upper GI tract secretions.  Continue to keep the head of the bed elevated and close monitoring of symptoms.            Febrile illness  Aspiration pneumonia of right lower lobe, unspecified aspiration pneumonia type (H)  Hyponatremia  Calculus of gallbladder without cholecystitis without obstruction      Interval History:   doing well; no cp, sob, n/v/d, or abd pain.              Review of Systems:   C: NEGATIVE for fever, chills, change in weight  E/M: NEGATIVE for ear, mouth and throat problems  R: NEGATIVE for significant cough or SOB  CV: NEGATIVE for chest pain, palpitations or peripheral edema             Medications:   I have reviewed this patient's current medications    acetylcysteine  2 mL Nebulization 4x Daily     albuterol  2.5 mg Nebulization Q4H While awake     aspirin  81 mg Oral or Feeding Tube Daily     Brivaracetam  100 mg Oral or Feeding  Tube BID     calcium carbonate  1,250 mg Per Feeding Tube TID w/meals     carBAMazepine  150 mg Oral or Feeding Tube Q6H     ferrous sulfate  300 mg Per Feeding Tube Daily     fiber modular (NUTRISOURCE FIBER)  1 packet Per G Tube Daily     hydrocortisone  10 mg Oral or Feeding Tube Daily with supper     hydrocortisone  20 mg Oral or Feeding Tube QAM     levothyroxine  150 mcg Oral or Feeding Tube QAM     metoclopramide  5 mg Per Feeding Tube BID AC     pantoprazole  40 mg Per J Tube Daily     piperacillin-tazobactam  3.375 g Intravenous Q6H     potassium & sodium phosphates  1 packet Oral or Feeding Tube TID     Scopolamine HBr  0.8 mg Oral or J tube TID     sodium bicarbonate  650 mg Oral or Feeding Tube Daily     vitamin D3  50 mcg Per Feeding Tube Daily                  Physical Exam:   Vitals were reviewed  Vital Signs with Ranges  Temp:  [97.4  F (36.3  C)-98.4  F (36.9  C)] 97.5  F (36.4  C)  Pulse:  [75-86] 86  Resp:  [18] 18  BP: ()/(48-58) 113/57  SpO2:  [95 %-100 %] 95 %  I/O last 3 completed shifts:  In: 514 [NG/GT:514]  Out: 1800 [Urine:1500; Emesis/NG output:300]  Constitutional: healthy, alert and no distress   Cardiovascular: negative, PMI normal. No lifts, heaves, or thrills. RRR. No murmurs, clicks gallops or rub  Respiratory: negative, Percussion normal. Good diaphragmatic excursion. Lungs clear  Neck: Neck supple. No adenopathy. Thyroid symmetric, normal size,, Carotids without bruits.  Abdomen: Abdomen soft, non-tender. BS normal. No masses, organomegaly  SKIN: no suspicious lesions or rashes           Data:   I reviewed the patient's new clinical lab test results.   Recent Labs   Lab Test 08/29/20  1140 08/28/20  0250 08/23/20  0845  01/01/20  0735  02/07/17  0452  01/30/17  0340   WBC 12.9* 15.4* 8.8   < > 6.7   < > 11.7*   < > 10.7   HGB 10.8* 13.9 10.9*   < > 11.1*   < > 9.4*   < > 7.6*   MCV 88 86 89   < > 80   < > 82   < > 81    372 352   < > 238   < > 363   < > 163   INR  --    --   --   --  1.28*  --  1.27*  --  1.32*    < > = values in this interval not displayed.     Recent Labs   Lab Test 08/29/20  1140 08/28/20  0250 08/24/20  0901   POTASSIUM 3.8 4.0 3.9   CHLORIDE 108 92* 100   CO2 24 26 30   BUN 13 19 15   ANIONGAP 6 7 5     Recent Labs   Lab Test 08/28/20  0429 08/28/20  0250 08/21/20  0007 08/14/20  0623 08/12/20  0635 08/10/20  2243  05/14/20  2031   ALBUMIN  --  3.3* 2.7*  --  2.2*  --    < >  --    BILITOTAL  --  0.4 0.3  --  0.5  --    < >  --    ALT  --  48 59  --  18  --    < >  --    AST  --  27 51*  --  13  --    < >  --    PROTEIN 10*  --   --   --   --  30*  --  10*   LIPASE  --  379  --  235 247  --    < >  --     < > = values in this interval not displayed.       I reviewed the patient's new imaging results.    All laboratory data reviewed  All imaging studies reviewed by me.    Beny Davis MD,  8/29/2020  [facility]  Office : [phone]  Cell: [phone]
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
## 2020-08-30 NOTE — PLAN OF CARE
DATE & TIME: 8/29/20 5529-5686   Cognitive Concerns/ Orientation : HECTOR, nonverbal at baseline. Could smile/give me thumbs up and shake his head yes or no to simple questions.   BEHAVIOR & AGGRESSION TOOL COLOR: Green  CIWA SCORE: NA    ABNL VS/O2: VSS on RA   MOBILITY: Up with lift, Hx of TBI with spastic hemiplegia. Turn/repo q2h.   PAIN MANAGMENT: Absent of nonverbal indicators, occasional moaning with incontinence cares.  DIET: NPO, TF increased to 40 ml/hr last @ 0500.   BOWEL/BLADDER: Incontinent of bowel and bladder. A lot of urine. Pt continuously pulling off condom catheter, removed. No BM this shift.  ABNL LAB/BG: WBC 12.9, Hgb 10.8   DRAIN/DEVICES: GJ tube, J tube infusing feeding, G tube connected to gravity bag ( 250 ml output, brown/yellow), IVF infusing NS + KCI 20 mEq @ 100 mL/hr.   TELEMETRY RHYTHM: NA  SKIN: Coccyx pressure ulcer, mepilex changed. Significant redness/rash around groin and penis, powder applied. Anti-fungal powder ordered.   TESTS/PROCEDURES: None scheduled.   D/C DAY/GOALS/PLACE: Possibly tomorrow, once tolerating tube feeding and afebrile. Per MD note.   OTHER IMPORTANT INFO: Contact ISO in place. On IV Zosyn q6h. GI following. Patient goal rate for tube feeding is 50 mL/hr. Infrequent, dry, non-productive cough. Somewhat restless overnight, did not sleep much.

## 2020-08-30 NOTE — PLAN OF CARE
DATE & TIME: 8/30/2020 0358-6857    Cognitive Concerns/ Orientation : nonverbal at baseline, follows commands.    BEHAVIOR & AGGRESSION TOOL COLOR: pulls during perineal care, otherwise green.   CIWA SCORE: na    ABNL VS/O2: vss, on RA, lungs clear.   MOBILITY: lift, turn Q 2 with Ax2.   PAIN MANAGMENT: denied pain.   DIET: NPO, TF through J port.   BOWEL/BLADDER: inc of B&B  ABNL LAB/BG: na   DRAIN/DEVICES: GJ patent. PIV on left arm, SL.   TELEMETRY RHYTHM: na   SKIN: extremely red perineal area, Lotrisone cream ordered to apply BID.   TESTS/PROCEDURES: na   D/C DAY/GOALS/PLACE: in am to home with mother.   OTHER IMPORTANT INFO: will be at goal rate of 50 ml/hour on TF at 1700 this evening.   MD/RN ROUNDING SIGNED OFF D/E SHIFT: yes   COMMIT TO SIT DONE AND SIGNED OFF yes

## 2020-08-30 NOTE — PLAN OF CARE
DATE & TIME: 8/29/20 5668-8526  Cognitive Concerns/ Orientation : HECTOR, nonverbal at baseline  BEHAVIOR & AGGRESSION TOOL COLOR: Green  CIWA SCORE: NA    ABNL VS/O2: VSS on RA at 95%  MOBILITY: Up with lift, Hx of TBI with spastic hemiplegia. Turn/repo q2h.   PAIN MANAGMENT: absent of nonverbal indicators, occasional moaning with incontinence cares.  DIET: NPO, TF increased to 30 ml/hr last @ 4pm  BOWEL/BLADDER: incontinent of bowel and bladder. Condom catheter in place. No BM this shift.  ABNL LAB/BG: WBC 12.9  DRAIN/DEVICES: GJ tube, J tube infusing feeding, G tube connected to gravity bag (250 ml output, brown/yellow), IVF infusing 100 mL/hr   TELEMETRY RHYTHM: NA  SKIN: coccyx pressure ulcer, mepilex CDI. Redness/rash around groin and Rt hip, powder applied.   TESTS/PROCEDURES: xray of abdomen completed, GJ tube in correct placement.   D/C DAY/GOALS/PLACE: possibly tomorrow, once tolerating tube feeding and afebrile.   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 On IV Zosyn q6h. GI following. Tmax 97.5 this shift. Patient goal rate for tube feeding is 50 mL/hr. Residual Q4 from G tube was 0 mL both times.  MD/RN ROUNDING SIGNED OFF D/E SHIFT: n/a  COMMIT TO SIT DONE AND SIGNED OFF Yes

## 2020-08-31 VITALS
OXYGEN SATURATION: 100 % | SYSTOLIC BLOOD PRESSURE: 117 MMHG | TEMPERATURE: 97.9 F | DIASTOLIC BLOOD PRESSURE: 57 MMHG | HEART RATE: 60 BPM | RESPIRATION RATE: 16 BRPM

## 2020-08-31 LAB
ANION GAP SERPL CALCULATED.3IONS-SCNC: 6 MMOL/L (ref 3–14)
BACTERIA SPEC CULT: ABNORMAL
BASOPHILS # BLD AUTO: 0 10E9/L (ref 0–0.2)
BASOPHILS NFR BLD AUTO: 0.6 %
BUN SERPL-MCNC: 13 MG/DL (ref 7–30)
CALCIUM SERPL-MCNC: 8.6 MG/DL (ref 8.5–10.1)
CHLORIDE SERPL-SCNC: 102 MMOL/L (ref 94–109)
CO2 SERPL-SCNC: 26 MMOL/L (ref 20–32)
CREAT SERPL-MCNC: 0.72 MG/DL (ref 0.66–1.25)
DIFFERENTIAL METHOD BLD: ABNORMAL
EOSINOPHIL # BLD AUTO: 0.4 10E9/L (ref 0–0.7)
EOSINOPHIL NFR BLD AUTO: 5.2 %
ERYTHROCYTE [DISTWIDTH] IN BLOOD BY AUTOMATED COUNT: 14.1 % (ref 10–15)
GFR SERPL CREATININE-BSD FRML MDRD: >90 ML/MIN/{1.73_M2}
GLUCOSE SERPL-MCNC: 102 MG/DL (ref 70–99)
HCT VFR BLD AUTO: 34.8 % (ref 40–53)
HGB BLD-MCNC: 11.5 G/DL (ref 13.3–17.7)
IMM GRANULOCYTES # BLD: 0 10E9/L (ref 0–0.4)
IMM GRANULOCYTES NFR BLD: 0.3 %
LYMPHOCYTES # BLD AUTO: 2.8 10E9/L (ref 0.8–5.3)
LYMPHOCYTES NFR BLD AUTO: 37.9 %
MAGNESIUM SERPL-MCNC: 2.2 MG/DL (ref 1.6–2.3)
MCH RBC QN AUTO: 29.2 PG (ref 26.5–33)
MCHC RBC AUTO-ENTMCNC: 33 G/DL (ref 31.5–36.5)
MCV RBC AUTO: 88 FL (ref 78–100)
MONOCYTES # BLD AUTO: 0.6 10E9/L (ref 0–1.3)
MONOCYTES NFR BLD AUTO: 7.7 %
NEUTROPHILS # BLD AUTO: 3.5 10E9/L (ref 1.6–8.3)
NEUTROPHILS NFR BLD AUTO: 48.3 %
NRBC # BLD AUTO: 0 10*3/UL
NRBC BLD AUTO-RTO: 0 /100
PHOSPHATE SERPL-MCNC: 2.7 MG/DL (ref 2.5–4.5)
PLATELET # BLD AUTO: 271 10E9/L (ref 150–450)
POTASSIUM SERPL-SCNC: 3.7 MMOL/L (ref 3.4–5.3)
RBC # BLD AUTO: 3.94 10E12/L (ref 4.4–5.9)
SODIUM SERPL-SCNC: 134 MMOL/L (ref 133–144)
SPECIMEN SOURCE: ABNORMAL
WBC # BLD AUTO: 7.3 10E9/L (ref 4–11)

## 2020-08-31 PROCEDURE — 80048 BASIC METABOLIC PNL TOTAL CA: CPT | Performed by: INTERNAL MEDICINE

## 2020-08-31 PROCEDURE — 99239 HOSP IP/OBS DSCHRG MGMT >30: CPT | Performed by: INTERNAL MEDICINE

## 2020-08-31 PROCEDURE — 83735 ASSAY OF MAGNESIUM: CPT | Performed by: INTERNAL MEDICINE

## 2020-08-31 PROCEDURE — 94640 AIRWAY INHALATION TREATMENT: CPT

## 2020-08-31 PROCEDURE — 25000132 ZZH RX MED GY IP 250 OP 250 PS 637: Mod: GY | Performed by: INTERNAL MEDICINE

## 2020-08-31 PROCEDURE — 25000125 ZZHC RX 250: Performed by: INTERNAL MEDICINE

## 2020-08-31 PROCEDURE — 94640 AIRWAY INHALATION TREATMENT: CPT | Mod: 76

## 2020-08-31 PROCEDURE — 40000275 ZZH STATISTIC RCP TIME EA 10 MIN

## 2020-08-31 PROCEDURE — 36415 COLL VENOUS BLD VENIPUNCTURE: CPT | Performed by: INTERNAL MEDICINE

## 2020-08-31 PROCEDURE — 85025 COMPLETE CBC W/AUTO DIFF WBC: CPT | Performed by: INTERNAL MEDICINE

## 2020-08-31 PROCEDURE — 84100 ASSAY OF PHOSPHORUS: CPT | Performed by: INTERNAL MEDICINE

## 2020-08-31 RX ORDER — AMOXICILLIN AND CLAVULANATE POTASSIUM 400; 57 MG/5ML; MG/5ML
500 POWDER, FOR SUSPENSION ORAL EVERY 8 HOURS
Status: DISCONTINUED | OUTPATIENT
Start: 2020-08-31 | End: 2020-08-31 | Stop reason: HOSPADM

## 2020-08-31 RX ORDER — AZITHROMYCIN 200 MG/5ML
200 POWDER, FOR SUSPENSION ORAL DAILY
Qty: 30 ML | Refills: 0 | Status: ON HOLD | OUTPATIENT
Start: 2020-08-31 | End: 2020-09-25

## 2020-08-31 RX ORDER — METOCLOPRAMIDE HYDROCHLORIDE 5 MG/5ML
10 SOLUTION ORAL
Qty: 473 ML | Refills: 0 | Status: SHIPPED | OUTPATIENT
Start: 2020-08-31 | End: 2020-10-25

## 2020-08-31 RX ORDER — AZITHROMYCIN 200 MG/5ML
200 POWDER, FOR SUSPENSION ORAL DAILY
Status: DISCONTINUED | OUTPATIENT
Start: 2020-09-01 | End: 2020-08-31 | Stop reason: HOSPADM

## 2020-08-31 RX ORDER — AMOXICILLIN AND CLAVULANATE POTASSIUM 400; 57 MG/5ML; MG/5ML
500 POWDER, FOR SUSPENSION ORAL EVERY 8 HOURS
Qty: 56.7 ML | Refills: 0 | Status: SHIPPED | OUTPATIENT
Start: 2020-08-31 | End: 2020-09-03

## 2020-08-31 RX ORDER — CLOTRIMAZOLE AND BETAMETHASONE DIPROPIONATE 10; .64 MG/G; MG/G
CREAM TOPICAL 2 TIMES DAILY PRN
COMMUNITY
Start: 2020-08-31 | End: 2021-12-27

## 2020-08-31 RX ADMIN — SODIUM BICARBONATE 650 MG TABLET 650 MG: at 08:15

## 2020-08-31 RX ADMIN — METOCLOPRAMIDE 10 MG: 5 SOLUTION ORAL at 06:31

## 2020-08-31 RX ADMIN — LEVOTHYROXINE SODIUM 150 MCG: 75 TABLET ORAL at 06:31

## 2020-08-31 RX ADMIN — PANTOPRAZOLE SODIUM 40 MG: 40 TABLET, DELAYED RELEASE ORAL at 08:16

## 2020-08-31 RX ADMIN — Medication 0.8 MG: at 08:17

## 2020-08-31 RX ADMIN — CALCIUM CARBONATE 1250 MG: 1250 SUSPENSION ORAL at 08:16

## 2020-08-31 RX ADMIN — ALBUTEROL SULFATE 2.5 MG: 2.5 SOLUTION RESPIRATORY (INHALATION) at 00:42

## 2020-08-31 RX ADMIN — Medication 50 MCG: at 08:14

## 2020-08-31 RX ADMIN — AMOXICILLIN AND CLAVULANATE POTASSIUM 500 MG: 400; 57 POWDER, FOR SUSPENSION ORAL at 05:43

## 2020-08-31 RX ADMIN — ACETYLCYSTEINE 4 ML: 200 SOLUTION ORAL; RESPIRATORY (INHALATION) at 07:25

## 2020-08-31 RX ADMIN — AZITHROMYCIN 200 MG: 200 POWDER, FOR SUSPENSION ORAL at 08:16

## 2020-08-31 RX ADMIN — ALBUTEROL SULFATE 2.5 MG: 2.5 SOLUTION RESPIRATORY (INHALATION) at 07:25

## 2020-08-31 RX ADMIN — CARBAMAZEPINE 150 MG: 100 SUSPENSION ORAL at 05:43

## 2020-08-31 RX ADMIN — HYDROCORTISONE 20 MG: 20 TABLET ORAL at 08:16

## 2020-08-31 RX ADMIN — CARBAMAZEPINE 150 MG: 100 SUSPENSION ORAL at 00:16

## 2020-08-31 RX ADMIN — POTASSIUM & SODIUM PHOSPHATES POWDER PACK 280-160-250 MG 1 PACKET: 280-160-250 PACK at 08:15

## 2020-08-31 RX ADMIN — CLOTRIMAZOLE AND BETAMETHASONE DIPROPIONATE: 10; .5 CREAM TOPICAL at 08:16

## 2020-08-31 RX ADMIN — ASPIRIN 81 MG 81 MG: 81 TABLET ORAL at 08:14

## 2020-08-31 RX ADMIN — BRIVARACETAM 100 MG: 10 SOLUTION ORAL at 08:55

## 2020-08-31 RX ADMIN — Medication 1 PACKET: at 08:22

## 2020-08-31 ASSESSMENT — ACTIVITIES OF DAILY LIVING (ADL)
ADLS_ACUITY_SCORE: 44

## 2020-08-31 NOTE — PLAN OF CARE
Given discharge  instructions to patients Mother over the phone and discharge meds, papers and belongings  to transport and discharged to home at 1125 am, vss and patient seems comfortable  at he time of discharge.

## 2020-08-31 NOTE — DISCHARGE INSTRUCTIONS
A referral for home Speech and Physical therapies has been sent to [facility].  Their phone number is [phone].

## 2020-08-31 NOTE — CONSULTS
Care Transition Initial Assessment - RN        Active Problems:    Recurrent Aspiration Pneumonia     Cognitive Status: awake, hx TBI     Contact information and PCP information verified: Yes     Insurance concerns: No Insurance issues identified  ASSESSMENT  Patient currently receives the following services: [facility] (P:[phone]) (Fax:[phone])for RN 12 Hr coverage daily and also receives a 12 hr PCA adarsh/night through [facility] (currently 80.5 hours every 7 days) and TF with supplies through [facility].     Identified issues/concerns regarding health management: There is a discharge order written.  Pt resides with his Mother Savannah.   She is also his Legal Guardian.  His PCA also lives with them.  This writer has communicated with Savannah this AM.  After the last discharge there was a problem with [facility] getting his lab work within a week.  His current 12 nursing service does not draw labs.  Pt will need a BMP within a week.  Savannah had previously arranged a PCP appointment with labs on 9/4/20.  She reports she will need a gastric drainage bag, as they do not have this at home.  Pt had 21 admissions in 2019 and 8 so far this year with most having to do with aspiration.    Savannah is asking for a home PT and ST and would like to use [facility], as he has had in the past.  This was discussed with rounding Hospitalist.  Will send referral when orders received.  Will also send orders to [facility].    Transportation has been arranged through [employer] transportation via stretcher at 11:00AM today.  Charge and RN caring for pt are aware.  Medications will need to be sent with pt.  RN caring for pt will need to call Savannah to go over the discharge instructions.     Care  (CTS) will continue to follow as needed.
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
## 2020-08-31 NOTE — PLAN OF CARE
DATE & TIME: 1900-0730 8/30/2020    Cognitive Concerns/ Orientation : HECTOR; mute    BEHAVIOR & AGGRESSION TOOL COLOR: green; in good spirits   CIWA SCORE:    ABNL VS/O2: VSS. RA  MOBILITY: Ax2; T/R Q2H   PAIN MANAGMENT: nonverbal indicators of pain   DIET: NPO; tube feedings   BOWEL/BLADDER: incontinent of bowel and bladder  ABNL LAB/BG: K 3.8.   DRAIN/DEVICES: saline locked; G tube draining to gravity (350 output). and J tube infusing TF @ goal 50ml.   TELEMETRY RHYTHM:   SKIN: reddened memo area that is getting scheduled cream and micon powder; mepilex on coccyx, blanchable red.   TESTS/PROCEDURES:   D/C DAY/GOALS/PLACE: Discharge tomorrow AM   OTHER IMPORTANT INFO: spoke with pt's mother and gave her update.   MD/RN ROUNDING SIGNED OFF D/E SHIFT: NA   COMMIT TO SIT DONE AND SIGNED OFF YES

## 2020-09-03 LAB
BACTERIA SPEC CULT: NO GROWTH
SPECIMEN SOURCE: NORMAL

## 2020-09-17 ENCOUNTER — HOSPITAL ENCOUNTER (OUTPATIENT)
Facility: CLINIC | Age: 60
Discharge: HOME | End: 2020-09-17
Attending: RADIOLOGY | Admitting: RADIOLOGY
Payer: MEDICARE

## 2020-09-17 ENCOUNTER — APPOINTMENT (OUTPATIENT)
Dept: INTERVENTIONAL RADIOLOGY/VASCULAR | Facility: CLINIC | Age: 60
End: 2020-09-17
Attending: RADIOLOGY
Payer: MEDICARE

## 2020-09-17 VITALS
RESPIRATION RATE: 16 BRPM | SYSTOLIC BLOOD PRESSURE: 120 MMHG | HEIGHT: 72 IN | TEMPERATURE: 96.4 F | HEART RATE: 60 BPM | OXYGEN SATURATION: 97 % | DIASTOLIC BLOOD PRESSURE: 62 MMHG | BODY MASS INDEX: 20.91 KG/M2

## 2020-09-17 DIAGNOSIS — R13.10 DYSPHAGIA, UNSPECIFIED TYPE: ICD-10-CM

## 2020-09-17 PROCEDURE — C1769 GUIDE WIRE: HCPCS

## 2020-09-17 PROCEDURE — 27210742 ZZH CATH CR1

## 2020-09-17 PROCEDURE — 27210815 ZZ H TUBE GASTRO CR13

## 2020-09-17 PROCEDURE — 40000854 ZZH STATISTIC SIMPLE TUBE INSERTION/CHARGE, PORT, CATH, FISTULOGRAM

## 2020-09-17 PROCEDURE — 49452 REPLACE G-J TUBE PERC: CPT

## 2020-09-17 NOTE — PROGRESS NOTES
PATIENT/VISITOR WELLNESS SCREENING    Step 1 Patient Screening    1. In the last month, have you been in contact with someone who was confirmed or suspected to have Coronavirus/COVID-19? No    2. Do you have the following symptoms?  Fever/Chills? No   Cough? No   Shortness of breath? No   New loss of taste or smell? No  Sore throat? No  Muscle or body aches? No  Headaches? No  Fatigue? No  Vomiting or diarrhea? No    Step 2 Visitor Screening    1. Name of Visitor (1 visitor per patient): [place] and NANCI    2. In the last month, have you been in contact with someone who was confirmed or suspected to have Coronavirus/COVID-19? No    3. Do you have the following symptoms?  Fever/Chills? No   Cough? No   Shortness of breath? No   Skin rash? No   Loss of taste or smell? No  Sore throat? No  Runny or stuffy nose? No  Muscle or body aches? No  Headaches? No  Fatigue? No  Vomiting or diarrhea? No    If the visitor has positive symptoms, notify supervisor/manger  Per policy, the visitor will need to leave the facility     Step 3 Refer to logic grid below for actions    NO SYMPTOM(S)    ACTIONS:  1. Standard rooming process  2. Provider to assess per normal protocol  3. Implement precautions as needed and per guidelines     POSITIVE SYMPTOM(S)  If positive for ANY of the following symptoms: fever, cough, shortness of breath, rash    ACTION:  1. Continue to have the patient wear a mask   2. Room patient as soon as possible  3. Don appropriate PPE when entering room  4. Provider evaluation

## 2020-09-17 NOTE — PROCEDURES
[facility]    Procedure: GJ tube exchange.     Date/Time: 9/17/2020 4:42 PM  Performed by: Megan Stout DO  Authorized by: Megan Stout DO     UNIVERSAL PROTOCOL   Site Marked: NA  Prior Images Obtained and Reviewed:  Yes  Required items: Required blood products, implants, devices and special equipment available    Patient identity confirmed:  Verbally with patient, arm band, provided demographic data and hospital-assigned identification number  Patient was reevaluated immediately before administering moderate or deep sedation or anesthesia  Confirmation Checklist:  Patient's identity using two indicators, relevant allergies, procedure was appropriate and matched the consent or emergent situation and correct equipment/implants were available  Time out: Immediately prior to the procedure a time out was called    Universal Protocol: the Joint Commission Universal Protocol was followed    Preparation: Patient was prepped and draped in usual sterile fashion           ANESTHESIA    Anesthesia: Local infiltration  Local Anesthetic:  Lidocaine 1% without epinephrine      SEDATION    Patient Sedated: No    See dictated procedure note for full details.  Findings: Gj tube exchange.    Specimens: none    Complications: None    Condition: Stable    PROCEDURE   Patient Tolerance:  Patient tolerated the procedure well with no immediate complications    Length of time physician/provider present for 1:1 monitoring during sedation: 0

## 2020-09-17 NOTE — DISCHARGE INSTRUCTIONS
G-tube Exchange Discharge Instructions     After you go home:      You may resume your normal diet    Care of Insertion Site:      For the first 48 hrs, check your puncture site every couple hours while you are awake     You may remove/change the dressing tomorrow    You may shower tomorrow    No tub baths, whirlpools or swimming until your puncture site has fully healed     Activity       You may go back to normal activity in 24 hours    Wait 48 hours before lifting, straining, exercise or other strenuous activity    Medicines:      You may resume all medications    Resume your Warfarin/Coumadin at your regular dose today. Follow up with your provider to have your INR rechecked    Resume your Platelet Inhibitors and Aspirin tomorrow at your regular dose    For minor pain, you may take Acetaminophen (Tylenol) or Ibuprofen (Advil)                 Call the provider who ordered this procedure if:      Blood or fluid is draining from the site    The site is red, swollen, hot, tender or there is foul-smelling drainage    Chills or a fever greater than 101 F (38 C)    Increased pain at the site    Any questions or concerns    Call  911 or go to the Emergency Room if:      Severe pain or trouble breathing    Bleeding that you cannot control      If you have questions call:          [facility] Radiology Dept @ [phone]        The provider who performed your procedure was _________________.        Caring for your G-tube    Tube Maintenance:    For ENFit Tubes:      Do NOT over tighten the connections (the purple end & hub connection).      Clean the connecting ends (especially the hub) every day.      If you are unable to disconnect the tubing ends, soak the connection in warm water (or wrap in a wet warm washcloth) to try and loosen the connection.    Possible problems with your tube may include:      Clogged with medications or feedings - most obstructions can be cleared with a small (3cc) syringe and warm  water. This may be repeated until the tube is unclogged. This can be prevented by frequently flushing the tube with water (60cc) during the day and always after medications & feedings.      Tube pulls out or falls out -cover the opening with gauze & tape. Call [phone] for further instructions      Skin breakdown and/or yeast infections at the insertion site - use of skin barrier ointments and anti-fungal powders can treat most site irritations.  Ask your pharmacist or provider for assistance (a prescription is not necessary).    In general, tube problems (including pulled tubes) are NOT emergency situations. Unless the pulling out of a tube is accompanied by uncontrollable bleeding, please DO NOT GO TO THE EMERGENCY ROOM!  Call [phone] with problems.    Tube Care:      Change the gauze dressing every 24 hours and if soiled (dirty).  Stabilize all tubes securely by using gauze and tape.  Clean tube site with soap & water using a cotton applicator (Q-tip) as needed to prevent irritation.    Flush feeding tube with 60cc of warm or room temperature water before and after meds.  To prevent the tube from clogging, ask your provider or pharmacist if liquid forms of your medications are available. If not, crush the pills well & be sure to flush the tube before & after all medications.    Flush feeding tube a minimum of every 4 hours and when feeding is completed with 60cc of water to keep the tube clear and avoid clogging.    Pt may use an abdominal (waist) binder to protect the G-tube.    If there is continued oozing or bleeding, redness, yellow/green/foul smelling drainage    STOP the feedings & use of the tube immediately if there is:      Continued oozing or bleeding at the site    Redness    Yellow/green or foul smelling drainage at the site    Uncontrolled stomach pain    Many of the supplies mentioned above can be purchased at your local pharmacy      For issues with your tube, please call:    [facility]  Interventional Radiology Dept at [phone]

## 2020-09-17 NOTE — PROGRESS NOTES
Care Suites Discharge Nursing Note    Patient Information  Name: Keyon Farias  Age: 60 year old    Discharge Education:  Discharge instructions reviewed: Yes  Additional education/resources provided: no  Patient/patient representative verbalizes understanding: Yes  Patient discharging on new medications: No  Medication education completed: N/A    Discharge Plans:   Discharge location: home  Discharge ride contacted: Yes  Approximate discharge time: 1530      Discharge Criteria:  Discharge criteria met and vital signs stable: Yes    Patient Belongs:  Patient belongings returned to patient: Yes    Megan Nuno RN

## 2020-09-21 ENCOUNTER — TRANSFERRED RECORDS (OUTPATIENT)
Dept: HEALTH INFORMATION MANAGEMENT | Facility: CLINIC | Age: 60
End: 2020-09-21

## 2020-09-21 DIAGNOSIS — Z79.899 ENCOUNTER FOR LONG-TERM (CURRENT) USE OF OTHER MEDICATIONS: ICD-10-CM

## 2020-09-21 DIAGNOSIS — G40.011 LOCALIZATION-RELATED IDIOPATHIC EPILEPSY AND EPILEPTIC SYNDROMES WITH SEIZURES OF LOCALIZED ONSET, INTRACTABLE, WITH STATUS EPILEPTICUS (H): ICD-10-CM

## 2020-09-21 DIAGNOSIS — M62.40 MUSCLE CONTRACTURE: Primary | ICD-10-CM

## 2020-09-24 ENCOUNTER — HOSPITAL ENCOUNTER (OUTPATIENT)
Facility: CLINIC | Age: 60
Setting detail: OBSERVATION
Discharge: HOME | End: 2020-09-26
Attending: EMERGENCY MEDICINE | Admitting: HOSPITALIST
Payer: MEDICARE

## 2020-09-24 DIAGNOSIS — J69.0 ASPIRATION PNEUMONIA, UNSPECIFIED ASPIRATION PNEUMONIA TYPE, UNSPECIFIED LATERALITY, UNSPECIFIED PART OF LUNG (H): Primary | ICD-10-CM

## 2020-09-24 DIAGNOSIS — R50.9 FEVER, UNSPECIFIED FEVER CAUSE: ICD-10-CM

## 2020-09-24 DIAGNOSIS — J69.0 ASPIRATION PNEUMONITIS (H): ICD-10-CM

## 2020-09-24 LAB — GLUCOSE BLDC GLUCOMTR-MCNC: 81 MG/DL (ref 70–99)

## 2020-09-24 PROCEDURE — 00000146 ZZHCL STATISTIC GLUCOSE BY METER IP

## 2020-09-24 PROCEDURE — 99285 EMERGENCY DEPT VISIT HI MDM: CPT | Mod: 25

## 2020-09-24 PROCEDURE — C9803 HOPD COVID-19 SPEC COLLECT: HCPCS

## 2020-09-24 RX ORDER — SODIUM CHLORIDE 9 MG/ML
INJECTION, SOLUTION INTRAVENOUS CONTINUOUS
Status: DISCONTINUED | OUTPATIENT
Start: 2020-09-25 | End: 2020-09-25

## 2020-09-25 ENCOUNTER — APPOINTMENT (OUTPATIENT)
Dept: GENERAL RADIOLOGY | Facility: CLINIC | Age: 60
End: 2020-09-25
Attending: EMERGENCY MEDICINE
Payer: MEDICARE

## 2020-09-25 LAB
ALBUMIN SERPL-MCNC: 3.2 G/DL (ref 3.4–5)
ALP SERPL-CCNC: 111 U/L (ref 40–150)
ALT SERPL W P-5'-P-CCNC: 22 U/L (ref 0–70)
ANION GAP SERPL CALCULATED.3IONS-SCNC: 7 MMOL/L (ref 3–14)
ANION GAP SERPL CALCULATED.3IONS-SCNC: 7 MMOL/L (ref 3–14)
AST SERPL W P-5'-P-CCNC: 19 U/L (ref 0–45)
BASOPHILS # BLD AUTO: 0 10E9/L (ref 0–0.2)
BASOPHILS # BLD AUTO: 0.1 10E9/L (ref 0–0.2)
BASOPHILS NFR BLD AUTO: 0.3 %
BASOPHILS NFR BLD AUTO: 0.3 %
BILIRUB SERPL-MCNC: 0.5 MG/DL (ref 0.2–1.3)
BUN SERPL-MCNC: 12 MG/DL (ref 7–30)
BUN SERPL-MCNC: 13 MG/DL (ref 7–30)
CALCIUM SERPL-MCNC: 7 MG/DL (ref 8.5–10.1)
CALCIUM SERPL-MCNC: 7.8 MG/DL (ref 8.5–10.1)
CHLORIDE SERPL-SCNC: 87 MMOL/L (ref 94–109)
CHLORIDE SERPL-SCNC: 94 MMOL/L (ref 94–109)
CO2 SERPL-SCNC: 23 MMOL/L (ref 20–32)
CO2 SERPL-SCNC: 28 MMOL/L (ref 20–32)
CREAT SERPL-MCNC: 0.71 MG/DL (ref 0.66–1.25)
CREAT SERPL-MCNC: 0.75 MG/DL (ref 0.66–1.25)
DIFFERENTIAL METHOD BLD: ABNORMAL
DIFFERENTIAL METHOD BLD: ABNORMAL
EOSINOPHIL # BLD AUTO: 0.4 10E9/L (ref 0–0.7)
EOSINOPHIL # BLD AUTO: 0.8 10E9/L (ref 0–0.7)
EOSINOPHIL NFR BLD AUTO: 3.4 %
EOSINOPHIL NFR BLD AUTO: 4.7 %
ERYTHROCYTE [DISTWIDTH] IN BLOOD BY AUTOMATED COUNT: 14 % (ref 10–15)
ERYTHROCYTE [DISTWIDTH] IN BLOOD BY AUTOMATED COUNT: 14 % (ref 10–15)
GFR SERPL CREATININE-BSD FRML MDRD: >90 ML/MIN/{1.73_M2}
GFR SERPL CREATININE-BSD FRML MDRD: >90 ML/MIN/{1.73_M2}
GLUCOSE SERPL-MCNC: 80 MG/DL (ref 70–99)
GLUCOSE SERPL-MCNC: 80 MG/DL (ref 70–99)
HCT VFR BLD AUTO: 32.8 % (ref 40–53)
HCT VFR BLD AUTO: 37.9 % (ref 40–53)
HGB BLD-MCNC: 11.2 G/DL (ref 13.3–17.7)
HGB BLD-MCNC: 13.2 G/DL (ref 13.3–17.7)
IMM GRANULOCYTES # BLD: 0 10E9/L (ref 0–0.4)
IMM GRANULOCYTES # BLD: 0.1 10E9/L (ref 0–0.4)
IMM GRANULOCYTES NFR BLD: 0.3 %
IMM GRANULOCYTES NFR BLD: 0.4 %
LABORATORY COMMENT REPORT: NORMAL
LACTATE BLD-SCNC: 2 MMOL/L (ref 0.7–2)
LYMPHOCYTES # BLD AUTO: 1.7 10E9/L (ref 0.8–5.3)
LYMPHOCYTES # BLD AUTO: 2.5 10E9/L (ref 0.8–5.3)
LYMPHOCYTES NFR BLD AUTO: 14.4 %
LYMPHOCYTES NFR BLD AUTO: 15.3 %
MCH RBC QN AUTO: 29.6 PG (ref 26.5–33)
MCH RBC QN AUTO: 30.1 PG (ref 26.5–33)
MCHC RBC AUTO-ENTMCNC: 34.1 G/DL (ref 31.5–36.5)
MCHC RBC AUTO-ENTMCNC: 34.8 G/DL (ref 31.5–36.5)
MCV RBC AUTO: 86 FL (ref 78–100)
MCV RBC AUTO: 87 FL (ref 78–100)
MONOCYTES # BLD AUTO: 0.7 10E9/L (ref 0–1.3)
MONOCYTES # BLD AUTO: 1 10E9/L (ref 0–1.3)
MONOCYTES NFR BLD AUTO: 5.9 %
MONOCYTES NFR BLD AUTO: 6.4 %
NEUTROPHILS # BLD AUTO: 13.1 10E9/L (ref 1.6–8.3)
NEUTROPHILS # BLD AUTO: 8.2 10E9/L (ref 1.6–8.3)
NEUTROPHILS NFR BLD AUTO: 74.2 %
NEUTROPHILS NFR BLD AUTO: 74.4 %
NRBC # BLD AUTO: 0 10*3/UL
NRBC BLD AUTO-RTO: 0 /100
PLATELET # BLD AUTO: 138 10E9/L (ref 150–450)
PLATELET # BLD AUTO: 142 10E9/L (ref 150–450)
POTASSIUM SERPL-SCNC: 3.6 MMOL/L (ref 3.4–5.3)
POTASSIUM SERPL-SCNC: 4 MMOL/L (ref 3.4–5.3)
PROCALCITONIN SERPL-MCNC: 0.5 NG/ML
PROT SERPL-MCNC: 7.6 G/DL (ref 6.8–8.8)
RBC # BLD AUTO: 3.79 10E12/L (ref 4.4–5.9)
RBC # BLD AUTO: 4.39 10E12/L (ref 4.4–5.9)
SARS-COV-2 RNA SPEC QL NAA+PROBE: NEGATIVE
SARS-COV-2 RNA SPEC QL NAA+PROBE: NORMAL
SODIUM SERPL-SCNC: 122 MMOL/L (ref 133–144)
SODIUM SERPL-SCNC: 124 MMOL/L (ref 133–144)
SODIUM SERPL-SCNC: 126 MMOL/L (ref 133–144)
SODIUM SERPL-SCNC: 129 MMOL/L (ref 133–144)
SODIUM SERPL-SCNC: 129 MMOL/L (ref 133–144)
SPECIMEN SOURCE: NORMAL
SPECIMEN SOURCE: NORMAL
WBC # BLD AUTO: 11.1 10E9/L (ref 4–11)
WBC # BLD AUTO: 17.6 10E9/L (ref 4–11)

## 2020-09-25 PROCEDURE — 25000128 H RX IP 250 OP 636: Performed by: HOSPITALIST

## 2020-09-25 PROCEDURE — 80048 BASIC METABOLIC PNL TOTAL CA: CPT | Performed by: HOSPITALIST

## 2020-09-25 PROCEDURE — 96375 TX/PRO/DX INJ NEW DRUG ADDON: CPT

## 2020-09-25 PROCEDURE — 25000128 H RX IP 250 OP 636: Performed by: EMERGENCY MEDICINE

## 2020-09-25 PROCEDURE — 27210429 ZZH NUTRITION PRODUCT INTERMEDIATE LITER

## 2020-09-25 PROCEDURE — G0378 HOSPITAL OBSERVATION PER HR: HCPCS

## 2020-09-25 PROCEDURE — 96361 HYDRATE IV INFUSION ADD-ON: CPT

## 2020-09-25 PROCEDURE — 85025 COMPLETE CBC W/AUTO DIFF WBC: CPT | Performed by: EMERGENCY MEDICINE

## 2020-09-25 PROCEDURE — 80053 COMPREHEN METABOLIC PANEL: CPT | Performed by: EMERGENCY MEDICINE

## 2020-09-25 PROCEDURE — 25000132 ZZH RX MED GY IP 250 OP 250 PS 637: Mod: GY | Performed by: INTERNAL MEDICINE

## 2020-09-25 PROCEDURE — 84145 PROCALCITONIN (PCT): CPT | Performed by: HOSPITALIST

## 2020-09-25 PROCEDURE — 87040 BLOOD CULTURE FOR BACTERIA: CPT | Performed by: EMERGENCY MEDICINE

## 2020-09-25 PROCEDURE — 25000132 ZZH RX MED GY IP 250 OP 250 PS 637: Mod: GY | Performed by: EMERGENCY MEDICINE

## 2020-09-25 PROCEDURE — 99220 ZZC INITIAL OBSERVATION CARE,LEVL III: CPT | Performed by: HOSPITALIST

## 2020-09-25 PROCEDURE — 96365 THER/PROPH/DIAG IV INF INIT: CPT

## 2020-09-25 PROCEDURE — C9803 HOPD COVID-19 SPEC COLLECT: HCPCS

## 2020-09-25 PROCEDURE — 83605 ASSAY OF LACTIC ACID: CPT | Performed by: EMERGENCY MEDICINE

## 2020-09-25 PROCEDURE — 85025 COMPLETE CBC W/AUTO DIFF WBC: CPT | Mod: 91 | Performed by: HOSPITALIST

## 2020-09-25 PROCEDURE — 96376 TX/PRO/DX INJ SAME DRUG ADON: CPT

## 2020-09-25 PROCEDURE — 84295 ASSAY OF SERUM SODIUM: CPT | Mod: 91 | Performed by: HOSPITALIST

## 2020-09-25 PROCEDURE — 25000125 ZZHC RX 250: Performed by: INTERNAL MEDICINE

## 2020-09-25 PROCEDURE — 25800030 ZZH RX IP 258 OP 636: Performed by: EMERGENCY MEDICINE

## 2020-09-25 PROCEDURE — 36415 COLL VENOUS BLD VENIPUNCTURE: CPT | Performed by: HOSPITALIST

## 2020-09-25 PROCEDURE — 71045 X-RAY EXAM CHEST 1 VIEW: CPT

## 2020-09-25 PROCEDURE — U0003 INFECTIOUS AGENT DETECTION BY NUCLEIC ACID (DNA OR RNA); SEVERE ACUTE RESPIRATORY SYNDROME CORONAVIRUS 2 (SARS-COV-2) (CORONAVIRUS DISEASE [COVID-19]), AMPLIFIED PROBE TECHNIQUE, MAKING USE OF HIGH THROUGHPUT TECHNOLOGIES AS DESCRIBED BY CMS-2020-01-R: HCPCS | Performed by: EMERGENCY MEDICINE

## 2020-09-25 RX ORDER — DEXTROSE MONOHYDRATE 100 MG/ML
INJECTION, SOLUTION INTRAVENOUS CONTINUOUS PRN
Status: DISCONTINUED | OUTPATIENT
Start: 2020-09-25 | End: 2020-09-26 | Stop reason: HOSPADM

## 2020-09-25 RX ORDER — CARBAMAZEPINE 100 MG/5ML
150 SUSPENSION ORAL EVERY 6 HOURS
Status: DISCONTINUED | OUTPATIENT
Start: 2020-09-25 | End: 2020-09-26 | Stop reason: HOSPADM

## 2020-09-25 RX ORDER — PIPERACILLIN SODIUM, TAZOBACTAM SODIUM 3; .375 G/15ML; G/15ML
3.38 INJECTION, POWDER, LYOPHILIZED, FOR SOLUTION INTRAVENOUS ONCE
Status: COMPLETED | OUTPATIENT
Start: 2020-09-25 | End: 2020-09-25

## 2020-09-25 RX ORDER — ACETAMINOPHEN 650 MG/1
650 SUPPOSITORY RECTAL EVERY 4 HOURS PRN
Status: DISCONTINUED | OUTPATIENT
Start: 2020-09-25 | End: 2020-09-26 | Stop reason: HOSPADM

## 2020-09-25 RX ORDER — AZITHROMYCIN 200 MG/5ML
200 POWDER, FOR SUSPENSION ORAL DAILY
COMMUNITY
End: 2020-10-25

## 2020-09-25 RX ORDER — GUAR GUM
1 PACKET (EA) ORAL DAILY
Status: DISCONTINUED | OUTPATIENT
Start: 2020-09-25 | End: 2020-09-26 | Stop reason: HOSPADM

## 2020-09-25 RX ORDER — NALOXONE HYDROCHLORIDE 0.4 MG/ML
.1-.4 INJECTION, SOLUTION INTRAMUSCULAR; INTRAVENOUS; SUBCUTANEOUS
Status: DISCONTINUED | OUTPATIENT
Start: 2020-09-25 | End: 2020-09-26 | Stop reason: HOSPADM

## 2020-09-25 RX ORDER — SODIUM BICARBONATE 650 MG/1
650 TABLET ORAL DAILY
Status: DISCONTINUED | OUTPATIENT
Start: 2020-09-25 | End: 2020-09-26 | Stop reason: HOSPADM

## 2020-09-25 RX ORDER — LEVOTHYROXINE SODIUM 75 UG/1
150 TABLET ORAL EVERY MORNING
Status: DISCONTINUED | OUTPATIENT
Start: 2020-09-25 | End: 2020-09-26 | Stop reason: HOSPADM

## 2020-09-25 RX ORDER — ONDANSETRON 2 MG/ML
4 INJECTION INTRAMUSCULAR; INTRAVENOUS EVERY 6 HOURS PRN
Status: DISCONTINUED | OUTPATIENT
Start: 2020-09-25 | End: 2020-09-26 | Stop reason: HOSPADM

## 2020-09-25 RX ORDER — PIPERACILLIN SODIUM, TAZOBACTAM SODIUM 4; .5 G/20ML; G/20ML
4.5 INJECTION, POWDER, LYOPHILIZED, FOR SOLUTION INTRAVENOUS EVERY 6 HOURS
Status: DISCONTINUED | OUTPATIENT
Start: 2020-09-25 | End: 2020-09-26 | Stop reason: HOSPADM

## 2020-09-25 RX ORDER — POLYETHYLENE GLYCOL 3350 17 G/17G
17 POWDER, FOR SOLUTION ORAL DAILY PRN
Status: DISCONTINUED | OUTPATIENT
Start: 2020-09-25 | End: 2020-09-26 | Stop reason: HOSPADM

## 2020-09-25 RX ORDER — LIDOCAINE 40 MG/G
CREAM TOPICAL
Status: DISCONTINUED | OUTPATIENT
Start: 2020-09-25 | End: 2020-09-26 | Stop reason: HOSPADM

## 2020-09-25 RX ORDER — BISACODYL 10 MG
10 SUPPOSITORY, RECTAL RECTAL DAILY PRN
Status: DISCONTINUED | OUTPATIENT
Start: 2020-09-25 | End: 2020-09-26 | Stop reason: HOSPADM

## 2020-09-25 RX ORDER — ALBUTEROL SULFATE 90 UG/1
6 AEROSOL, METERED RESPIRATORY (INHALATION)
Status: DISCONTINUED | OUTPATIENT
Start: 2020-09-25 | End: 2020-09-26 | Stop reason: HOSPADM

## 2020-09-25 RX ORDER — HYDROCORTISONE 10 MG/1
10 TABLET ORAL
Status: DISCONTINUED | OUTPATIENT
Start: 2020-09-25 | End: 2020-09-26 | Stop reason: HOSPADM

## 2020-09-25 RX ORDER — LANOLIN ALCOHOL/MO/W.PET/CERES
3 CREAM (GRAM) TOPICAL
Status: DISCONTINUED | OUTPATIENT
Start: 2020-09-25 | End: 2020-09-26 | Stop reason: HOSPADM

## 2020-09-25 RX ORDER — ASPIRIN 81 MG/1
81 TABLET, CHEWABLE ORAL DAILY
Status: DISCONTINUED | OUTPATIENT
Start: 2020-09-25 | End: 2020-09-26 | Stop reason: HOSPADM

## 2020-09-25 RX ORDER — ONDANSETRON 4 MG/1
4 TABLET, ORALLY DISINTEGRATING ORAL EVERY 6 HOURS PRN
Status: DISCONTINUED | OUTPATIENT
Start: 2020-09-25 | End: 2020-09-26 | Stop reason: HOSPADM

## 2020-09-25 RX ORDER — AMOXICILLIN 250 MG
2 CAPSULE ORAL 2 TIMES DAILY PRN
Status: DISCONTINUED | OUTPATIENT
Start: 2020-09-25 | End: 2020-09-26 | Stop reason: HOSPADM

## 2020-09-25 RX ORDER — AMOXICILLIN 250 MG
1 CAPSULE ORAL 2 TIMES DAILY PRN
Status: DISCONTINUED | OUTPATIENT
Start: 2020-09-25 | End: 2020-09-26 | Stop reason: HOSPADM

## 2020-09-25 RX ORDER — HYDROCORTISONE 20 MG/1
20 TABLET ORAL EVERY MORNING
Status: DISCONTINUED | OUTPATIENT
Start: 2020-09-25 | End: 2020-09-26 | Stop reason: HOSPADM

## 2020-09-25 RX ORDER — ACETAMINOPHEN 325 MG/1
650 TABLET ORAL EVERY 4 HOURS PRN
Status: DISCONTINUED | OUTPATIENT
Start: 2020-09-25 | End: 2020-09-26 | Stop reason: HOSPADM

## 2020-09-25 RX ADMIN — Medication 1 PACKET: at 16:48

## 2020-09-25 RX ADMIN — SODIUM BICARBONATE 650 MG TABLET 650 MG: at 13:38

## 2020-09-25 RX ADMIN — CARBAMAZEPINE 150 MG: 100 SUSPENSION ORAL at 18:42

## 2020-09-25 RX ADMIN — PANTOPRAZOLE SODIUM 40 MG: 40 TABLET, DELAYED RELEASE ORAL at 13:53

## 2020-09-25 RX ADMIN — BRIVARACETAM 100 MG: 50 INJECTION, SUSPENSION INTRAVENOUS at 12:16

## 2020-09-25 RX ADMIN — LEVOTHYROXINE SODIUM 150 MCG: 75 TABLET ORAL at 13:38

## 2020-09-25 RX ADMIN — HYDROCORTISONE 10 MG: 10 TABLET ORAL at 16:48

## 2020-09-25 RX ADMIN — HYDROCORTISONE 20 MG: 20 TABLET ORAL at 13:38

## 2020-09-25 RX ADMIN — PIPERACILLIN SODIUM AND TAZOBACTAM SODIUM 3.38 G: 3; .375 INJECTION, POWDER, LYOPHILIZED, FOR SOLUTION INTRAVENOUS at 01:04

## 2020-09-25 RX ADMIN — BRIVARACETAM 100 MG: 10 SOLUTION ORAL at 21:37

## 2020-09-25 RX ADMIN — SODIUM CHLORIDE: 9 INJECTION, SOLUTION INTRAVENOUS at 06:27

## 2020-09-25 RX ADMIN — PIPERACILLIN SODIUM AND TAZOBACTAM SODIUM 4.5 G: 4; .5 INJECTION, POWDER, LYOPHILIZED, FOR SOLUTION INTRAVENOUS at 13:37

## 2020-09-25 RX ADMIN — PIPERACILLIN SODIUM AND TAZOBACTAM SODIUM 4.5 G: 4; .5 INJECTION, POWDER, LYOPHILIZED, FOR SOLUTION INTRAVENOUS at 09:20

## 2020-09-25 RX ADMIN — SODIUM CHLORIDE: 9 INJECTION, SOLUTION INTRAVENOUS at 01:38

## 2020-09-25 RX ADMIN — ASPIRIN 81 MG 81 MG: 81 TABLET ORAL at 13:38

## 2020-09-25 RX ADMIN — POTASSIUM & SODIUM PHOSPHATES POWDER PACK 280-160-250 MG 1 PACKET: 280-160-250 PACK at 21:36

## 2020-09-25 RX ADMIN — CARBAMAZEPINE 150 MG: 100 SUSPENSION ORAL at 13:53

## 2020-09-25 RX ADMIN — PIPERACILLIN SODIUM AND TAZOBACTAM SODIUM 4.5 G: 4; .5 INJECTION, POWDER, LYOPHILIZED, FOR SOLUTION INTRAVENOUS at 21:10

## 2020-09-25 RX ADMIN — POTASSIUM & SODIUM PHOSPHATES POWDER PACK 280-160-250 MG 1 PACKET: 280-160-250 PACK at 13:38

## 2020-09-25 RX ADMIN — SODIUM CHLORIDE 1000 ML: 9 INJECTION, SOLUTION INTRAVENOUS at 00:14

## 2020-09-25 RX ADMIN — ACETAMINOPHEN 1000 MG: 325 SUSPENSION ORAL at 01:01

## 2020-09-25 NOTE — CONSULTS
CLINICAL NUTRITION SERVICES  -  ASSESSMENT NOTE    Recommendations Ordered by Registered Dietitian (RD):   Start EN continuously:   Isosource 1.5 @ 50 ml/hr x 24 hours.   Provides 1800 kcal, 82 g protein (1.1 g/kg and 91% needs), 211 g CHO, 18 g fiber, 912 mL H2O   Free Water Flush: 200 mL every 4 hours  Total Fluid (TF + flushes) = 2112 mL  NutriSource Fiber 1 pkt daily = 3 g fiber, 15 kcal  Total 21 g fiber    Malnutrition:   % Weight Loss:  None noted  % Intake:  Decreased intake does not meet criteria for malnutrition   Subcutaneous Fat Loss:  Unable to assess  Muscle Loss:  Unable to assess  Fluid Retention:  None noted    Malnutrition Diagnosis: Unable to determine due to lack of nutrition focused physical exam.      REASON FOR ASSESSMENT  Keyon Farias is a 60 year old male seen by Registered Dietitian for Provider Order - Registered Dietitian to Assess and Order TF per Medical Nutrition Therapy Protocol    NUTRITION HISTORY  - Information obtained from chart review. Patient is familiar to service from numerous and frequent admissions.     - GT is to gravity, JT is being used for feeds to assist in aspiration prevention.     - Last known home enteral regimen -->   - J-tube   - Isosource 1.5 at 100 mL/hr x 12 hours (8am - 8pm)   Provides 1800 kcal, 82 g protein (1.1 g/kg and 91% needs), 211 g CHO, 18 g fiber, 912 mL H2O   Free Water Flush: 200 mL every 4 hours + 60 mL before and after each cycle   Also receives NutriSource Fiber 1 pkt daily = 3 g fiber, 15 kcal   Total = 1815 kcal (24 kcal/kg)   TOTAL FLUID (TF + flushes) = 2232 mL    - Patient also receives NeutraPhos TID chronically.     - It appears he was discharged from most recent admission with a continuous enteral order, Isosource 1.5 @ 50 ml/hr x 24 hours.       CURRENT NUTRITION ORDERS  Diet Order:     NPO     Current Intake/Tolerance:  No enteral orders yet in place    NUTRITION FOCUSED PHYSICAL ASSESSMENT FOR DIAGNOSING MALNUTRITION)  No:  Patient  admitted to COVID rule out unit    Obtained from Chart/Interdisciplinary Team:  GT to gravity  JT in place, used for feedings.   Incontinence of bowel  Ricci - Nutrition 2: total 12    ANTHROPOMETRICS  Height: 6'  Weight: 162 lbs 6.4 oz (73.7 kg)   Body mass index is 22.03 kg/m .  Weight Status:  Normal BMI  IBW: 80.9 kg   % IBW: 91%  Weight History:   Wt Readings from Last 10 Encounters:   09/25/20 73.7 kg (162 lb 6.4 oz)   08/22/20 69.9 kg (154 lb 3.2 oz)   08/15/20 74.2 kg (163 lb 9.3 oz)   05/17/20 67.2 kg (148 lb 2.4 oz)   05/06/20 74.2 kg (163 lb 8 oz)   03/26/20 74.8 kg (165 lb)   02/21/20 70.4 kg (155 lb 3.3 oz)   02/12/20 72.9 kg (160 lb 11.5 oz)   01/30/20 74.8 kg (165 lb)   01/29/20 74.8 kg (165 lb)       LABS  Labs reviewed  Na 126 (L)    MEDICATIONS  Medications reviewed  NeutraPhos TID    ASSESSED NUTRITION NEEDS PER APPROVED PRACTICE GUIDELINES:  Dosing Weight  73.7 kg  Estimated Energy Needs: 7576-6913 kcals (20-25 Kcal/Kg)  Justification: maintenance, WC bound  Estimated Protein Needs:  grams protein (1.2-1.5 g pro/Kg)  Justification: maintenance  Estimated Fluid Needs: 1 mL per kcal, or per MD for fluid management    MALNUTRITION:  % Weight Loss:  None noted  % Intake:  Decreased intake does not meet criteria for malnutrition   Subcutaneous Fat Loss:  Unable to assess  Muscle Loss:  Unable to assess  Fluid Retention:  None noted    Malnutrition Diagnosis: Unable to determine due to lack of nutrition focused physical exam.     NUTRITION DIAGNOSIS:  Inadequate protein-energy intake related to hospitalization, EN on hold after aspiration event as evidenced by 0% energy and protein needs met since admission.      NUTRITION INTERVENTIONS  Recommendations / Nutrition Prescription  Start EN continuously:   Isosource 1.5 @ 50 ml/hr x 24 hours.   Provides 1800 kcal, 82 g protein (1.1 g/kg and 91% needs), 211 g CHO, 18 g fiber, 912 mL H2O   Free Water Flush: 200 mL every 4 hours  Total Fluid (TF +  flushes) = 2112 mL  NutriSource Fiber 1 pkt daily = 3 g fiber, 15 kcal  Total 21 g fiber     Implementation  Nutrition education: No education needs assessed at this time  EN Composition, EN Schedule and Feeding Tube Flush: as above      Nutrition Goals  EN at goal to provide % est needs.       MONITORING AND EVALUATION:  Progress towards goals will be monitored and evaluated per protocol and Practice Guidelines    Marisel Fernández RD, LD  [facility], [facility], [facility], [facility]   Pager: [phone]  Weekend Pager: [phone]

## 2020-09-25 NOTE — PROGRESS NOTES
Hospitalist update note:    Patient seen and evaluated.  Still non-verbal.  COVID 19 PCR negative so special precautions removed and will transfer    Plan:   - Spoke to IR.  They stated that if the J-tube port was being used as directed the patient should not be having issues with aspiration/feeding tube  - Social work consulted.  Would like a vulnerable adult report placed as I question if the patient's mother is still able to manage taking care of him   - Resumed PTA medications   - Nutrition consulted to help with tube feeds  - Plan for discharge back to home tomorrow       Yaron Lujan DO   Hospitalist

## 2020-09-25 NOTE — PLAN OF CARE
Observation goals  PRIOR TO DISCHARGE      Comments: -diagnostic tests and consults completed and resulted -Not met  -vital signs normal or at patient baseline -Met  -returns to baseline functional status -Met

## 2020-09-25 NOTE — CONSULTS
CHE  D: CHE consulted for VA report. VA report filed with Adult Protective Services with regards to concerns that family and PCA are unable to care for patients complex medical needs and frequent hospitalizations for aspiration events. Noted patient has G-J tube and should only be receiving medications and nutrition through tube feeds.   Report submitted on 9/25/20at 14:39.   Web Report Number: [id number]     GILMER Rees, [facility]  [phone]

## 2020-09-25 NOTE — ED PROVIDER NOTES
History     Chief Complaint:    Fever and Cough      The history is provided by a parent.      Keyon Farias is a 60 year old male who presents with fever. Per his mother, who is his caregiver, the patient has been smiling less. She is concerned for aspiration as he had a history of this. The patient is unable to provide any history.    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  Smoking Status: Former Smoker  Smokeless Tobacco: Never Used  Alcohol Use: Negative  Drug Use: Negative  PCP: Carlos Gomez    Marital Status:  Single     Review of Systems   Unable to perform ROS: Patient nonverbal       Physical Exam     Patient Vitals for the past 24 hrs:   BP Temp Pulse Resp SpO2   09/25/20 0315 92/52 -- 95 21 95 %   09/25/20 0245 -- -- 96 20 96 %   09/25/20 0240 92/63 -- 103 13 97 %   09/25/20 0230 -- -- 100 18 --   09/25/20 0215 -- -- 98 21 --   09/25/20 0205 -- -- 101 21 --   09/25/20 0200 (!) 84/50 -- 98 21 --    09/25/20 0155 -- -- 96 23 --   09/25/20 0150 -- -- 92 23 --   09/25/20 0145 -- -- 87 22 --   09/25/20 0130 106/56 -- 107 22 98 %   09/25/20 0115 -- -- 112 19 100 %   09/25/20 0100 119/66 -- 108 21 96 %   09/25/20 0045 -- -- 113 12 99 %   09/25/20 0032 111/62 100.2  F (37.9  C) 109 22 97 %   09/25/20 0030 111/62 -- 110 22 100 %       Physical Exam  General: Appears well-developed and well-nourished.   Head: No signs of trauma.   CV: Mild tachycardia and regular rhythm.    Resp: Effort normal and coarse breath sounds. No respiratory distress.   GI: Soft. There is no tenderness.  No rebound or guarding.  Normal bowel sounds.    MSK: Normal range of motion.   Neuro: The patient is alert   Skin: Skin is warm and dry. No rash noted.   Psych: normal mood and affect. behavior is normal.       Emergency Department Course     Imaging:  Radiology findings were communicated with the patient, family and Admitting MD who voiced understanding of the findings.    XR Chest, Portable, G/E 1 view:   There is mild bibasilar atelectasis and consolidation similar to comparison examination. There is mild elevation of the right hemidiaphragm. No pneumothorax. No pleural effusion. Normal heart size. As per radiology.    Laboratory:  Laboratory findings were communicated with the patient, family and Admitting MD who voiced understanding of the findings.    CBC: WBC: 11.1 (H), HGB: 13.2 (L), PLT: 142 (L)    CMP: Glucose 80, Sodium: 122 (L), Chloride: 87 (L), Calcium: 7.8 (L), Albumin: 3.2 (L), o/w WNL (Creatinine: 0.71)    Glucose by meter: 81    0003 Lactic Acid: 2.0    COVID-19 Virus (Coronavirus), PCR NP Swab: Pending      Blood Culture x2: Pending    Interventions:  0014 NS 1L IV  0101 Tylenol 1000 mg Per G tube  0104 Zosyn 3.375 g IV    Emergency Department Course:  Past medical records, nursing notes, and vitals reviewed.    2356 I performed an exam of the patient as documented above.     IV was inserted and blood was drawn for  laboratory testing, results above.    The patient was sent for a Chest X-ray while in the emergency department, results above.     0246 I consulted with Dr. Garcia, Hospitalist, regarding the patient's history and presentation here in the emergency department.    0249 I rechecked the patient and discussed the results of his workup thus far.     Findings and plan explained to the Patient and mother who consents to admission. Discussed the patient with Dr. Garcia, who will admit the patient to a Med Bed bed for further monitoring, evaluation, and treatment.    I personally reviewed the laboratory and imaging results with the Patient and mother and answered all related questions prior to admission.     Impression & Plan     Covid-19  Keyon Farias was evaluated during a global COVID-19 pandemic, which necessitated consideration that the patient might be at risk for infection with the SARS-CoV-2 virus that causes COVID-19.   Applicable protocols for evaluation were followed during the patient's care.   COVID-19 was considered as part of the patient's evaluation. The plan for testing is:  a test was obtained during this visit.    Medical Decision Making:  Keyon Farias is a 60-year-old gentleman who presents due to a fever.  Patient is unable to provide any history, but apparently per his mother he was smiling last and a fever and a concern for aspiration as he has a history of this.  Patient was noted to be febrile in the ER.  He is not in any respiratory distress.  X-ray did show findings concerning for possible aspiration.  Patient was given a dose of Zosyn will be admitted for continued monitoring.  It is possible pt may have an aspiration pneumonitis based on prior presentations.  COVID swab was sent.    Diagnosis:    ICD-10-CM    1. Fever, unspecified fever cause  R50.9    2. Aspiration pneumonitis (H)  J69.0        Disposition:  Admitted to Dr. Garcia.    Scribe Disclosure:  I, Shamar Monzon, am serving as a  scribe at 11:57 PM on 9/24/2020 to document services personally performed by Keyon Ying MD based on my observations and the provider's statements to me.        Keyon Ying MD  09/25/20 0808

## 2020-09-25 NOTE — PROGRESS NOTES
Observation Goals     -diagnostic tests and consults completed and resulted - not met  -vital signs normal or at patient baseline - met, no longer febrile  -returns to baseline functional status - partially met  Nurse to notify provider when observation goals have been met and patient is ready for discharge.
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
## 2020-09-25 NOTE — PROGRESS NOTES
RECEIVING UNIT ED HANDOFF REVIEW    ED Nurse Handoff Report was reviewed by: Lashell Vazquez RN on September 25, 2020 at 5:12 AM

## 2020-09-25 NOTE — PLAN OF CARE
Observation goals  PRIOR TO DISCHARGE      Comments: -diagnostic tests and consults completed and resulted -not met  -vital signs normal or at patient baseline -met  -returns to baseline functional status -not met

## 2020-09-25 NOTE — PLAN OF CARE
HECTOR orientation, nonverbal. Alert, opens eyes spontaneously or to voice. Up with lift. T/R q2h/prn. NPO. TF started at 1700, increase rate to goal at 2100 if pt tolerating. Meds and feeding via J tube. G tube open to gravity. VSS, on room air. Incontinent of bowel/bladder. Blanchable/nonblanchable erythema to coccyx/buttocks, mepilex in place. No signs/symptoms of pain. Spastic hemiplegia R side. Contact iso maintained. Likely discharge to home tomorrow.

## 2020-09-25 NOTE — PHARMACY-ADMISSION MEDICATION HISTORY
Pharmacy Medication History  Admission medication history interview status for the 9/24/2020  admission is complete. See EPIC admission navigator for prior to admission medications     Medication history sources: Patient's family/friend (mother)  Medication history source reliability: Good    Significant changes made to the medication list:  None    Additional medication history information:   Pt's mother (Savannah) states that the medication list has not changed since the pt was last discharged  On 8/31/2020. Medications were taken as scheduled up until the pt was admitted.     Medication reconciliation completed by provider prior to medication history? No    Time spent in this activity: 10 minutes      Prior to Admission medications    Medication Sig Last Dose Taking? Auth Provider   acetylcysteine (MUCOMYST) 20 % neb solution Take 2 mLs by nebulization 4 times daily With albuterol at 0700, 1100, 1500, and 1900  9/24/2020 at Unknown time Yes Unknown, Entered By History   albuterol (PROVENTIL) (5 MG/ML) 0.5% neb solution Take 2.5 mg by nebulization every 4 hours (while awake) 0700 1100 1500 1900 with mucomyst  9/24/2020 at Unknown time Yes Unknown, Entered By History   aspirin (ASA) 81 MG chewable tablet 81 mg by Oral or Feeding Tube route daily At 0900 9/24/2020 at Unknown time Yes Unknown, Entered By History   bacitracin ointment Apply topically daily as needed for wound care To PEG site.   Yes Unknown, Entered By History   Brivaracetam (BRIVIACT) 10 MG/ML solution 100 mg by Oral or Feeding Tube route 2 times daily 0900, 2100 9/24/2020 at PM Yes Unknown, Entered By History   calcium carbonate 1250 (500 CA) MG/5ML SUSP suspension 5 mLs (1,250 mg) by Per J Tube route 3 times daily (with meals) 9/24/2020 at Unknown time Yes Mariana Venegas MD   carBAMazepine (TEGRETOL) 100 MG/5ML suspension 150 mg by Oral or Feeding Tube route every 6 hours At 06:00, 12:00, 18:00 and 24:00 for seizures  9/24/2020 at PM Yes  Unknown, Entered By History   clotrimazole-betamethasone (LOTRISONE) 1-0.05 % external cream Apply topically 2 times daily Please give rest of bottle to bring home. 9/24/2020 at Unknown time Yes Leticia Santiago MD   ferrous sulfate 220 (44 Fe) MG/5ML ELIX 220 mg by Per Feeding Tube route daily 9/24/2020 at Unknown time Yes Unknown, Entered By History   Guar Gum (FIBER MODULAR, NUTRISOURCE FIBER,) packet 1 packet by Per G Tube route daily 9/24/2020 at Unknown time Yes Starr Champion MD   hydrocortisone (CORTEF) 5 MG tablet 10 mg by Oral or FT or NG tube route daily (with dinner) At 1500 9/24/2020 at PM Yes Unknown, Entered By History   hydrocortisone (CORTEF) 5 MG tablet 20 mg by Oral or FT or NG tube route every morning  9/24/2020 at AM Yes Unknown, Entered By History   hydrocortisone 1 % CREA cream Place rectally 2 times daily as needed for other Apply to reddened memo areas as needed  Yes Unknown, Entered By History   levothyroxine (SYNTHROID/LEVOTHROID) 150 MCG tablet Take 150 mcg by mouth every morning 9/24/2020 at Unknown time Yes Unknown, Entered By History   melatonin (MELATONIN) 1 MG/ML LIQD liquid 6 mg by Per NG tube route At Bedtime  9/24/2020 at Unknown time Yes Unknown, Entered By History   metoclopramide (REGLAN) 5 MG/5ML solution 10 mLs (10 mg) by Per Feeding Tube route 4 times daily (before meals and nightly) 9/24/2020 at PM Yes Leticia Santiago MD   miconazole (MICATIN) 2 % AERP powder Apply topically 2 times daily as needed   Yes Unknown, Entered By History   Multiple Vitamins-Minerals (EMERGEN-C VITAMIN C) PACK 1 packet by Oral or Feeding Tube route daily Vitamin C 1000 mg 9/24/2020 at Unknown time Yes Unknown, Entered By History   mupirocin (BACTROBAN) 2 % external ointment Apply topically 2 times daily as needed   Yes Reported, Patient   pantoprazole (PROTONIX) 2 mg/mL SUSP suspension 20 mLs (40 mg) by Per J Tube route daily 9/24/2020 at Unknown time Yes Alvaro Barahona MD   potassium &  sodium phosphates (NEUTRA-PHOS) 280-160-250 MG Packet Take 1 packet by mouth 3 times daily  9/24/2020 at Unknown time Yes Yanely Liriano MD   prochlorperazine (COMPAZINE) 25 MG suppository Place 1 suppository (25 mg) rectally every 12 hours as needed for nausea or vomiting  Yes Alvaro Barahona MD   Scopolamine HBr POWD Dispense #90. Mix contents with small amount of water for admin via J-tube.  Administer 0.8 mg three times each day. 9/24/2020 at Unknown time Yes Jennie Bermudez MD   Skin Protectants, Misc. (BALMEX SKIN PROTECTANT) OINT Externally apply topically 2 times daily as needed (irritation) Applay to reddened memo areas twice daily as needed  Yes Unknown, Entered By History   sodium bicarbonate 650 MG tablet Take 1 tablet (650 mg) by mouth daily 9/24/2020 at Unknown time Yes Ricky Torres MD   testosterone cypionate (DEPOTESTOTERONE CYPIONATE) 200 MG/ML injection Inject 76 mg into the muscle See Admin Instructions Every 2 weeks on Thursdays  76 mg or 0.38 mL Past Week at Unknown time Yes Unknown, Entered By History   vitamin D3 (CHOLECALCIFEROL) 2000 units (50 mcg) tablet Take 2,000 Units by mouth daily Crush and feed via j-tube @@ 0900 9/24/2020 at Unknown time Yes Unknown, Entered By History   order for DME Equipment being ordered: Nebulizer   Starr Champion MD

## 2020-09-25 NOTE — ED NOTES
Bed: ED05  Expected date:   Expected time:   Means of arrival:   Comments:  jamie 1 50m fever eta 7

## 2020-09-25 NOTE — PLAN OF CARE
Pt arrived on unit @0600. No belongings w/pt. VSS on RA. HECTOR orientation, pt is non-verbal. Total care. Incontinent of B&B, had one episode of each when pt arrived. HECTOR pain, Flacc pain scale was 0. Lung sounds have crackles, difficult to hear because pt is able to make incomprehensible noises. Bottom is red and blanchable, mepilex in place. Q4 neuros, intact from baseline. NPO. Has a G-tube for meds. Sputum sample is needed. Care coordinator and SW consults today.

## 2020-09-25 NOTE — PROGRESS NOTES
SW  D: Consult acknowledged. Call placed and message left with patient guardian/mother, Savannah ([phone]) regarding her thoughts on discharge when patient is medically cleared. Writer realizes patient arrived only a few hours ago under Observation status.    P: Waiting for a call back from Savannah.     GILMER Rees, [facility]  [phone]

## 2020-09-25 NOTE — PROVIDER NOTIFICATION
"Text page to Dr. Lujan, \"Spoke with pt's mother, She states the only thing going into G tube is Reglan QID. She reports that Keyon threw up yesterday morning, no other potential aspiration events reported.\"  "

## 2020-09-25 NOTE — PROVIDER NOTIFICATION
Dr. Lujan paged. COVID negative. Two orders for Briviact. G tube is to gravity bag, pt's mother said they were told to do this at home. Wondering about plan for feedings.   Per MD ok to transfer to medical unit. MD will discontinue isolation special precautions for COVID-19. Nutrition consult placed. Na has increased 4 points since midnight. Saline lock IV. MD placing orders.

## 2020-09-25 NOTE — ED NOTES
[facility]  ED Nurse Handoff Report    ED Chief complaint: Fever and Cough      ED Diagnosis:   Final diagnoses:   None       Code Status: Admitting doctor to address.     Allergies:   Allergies   Allergen Reactions     Dilantin [Phenytoin Sodium]      Scopolamine Hives     Hives in response to scopolamine PATCH      Valproic Acid      Toxicity c bone marrow suspension, elevated ammonia levels        Patient Story: Pt is a 60 year old male who presents with fever. Per his mother, who is his caregiver, the patient has been smiling less. She is concerned for aspiration as he had a history of this. The patient is unable to provide any history.     Focused Assessment:  Pt come in with fever of 100.2 orally, awake, alert but non verbal.       Treatments and/or interventions provided: Labs, chest Xray, IV fluids, Antibiotic.     Patient's response to treatments and/or interventions: Tolerating well.     To be done/followed up on inpatient unit:      Does this patient have any cognitive concerns?: Pt is awake, alert but non verbal.     Activity level - Baseline/Home:  Total Care  Activity Level - Current:   Total Care    Patient's Preferred language: English   Needed?: No    Isolation: COVID r/o and special precautions  Infection: COVID r/o and special precautions  Bariatric?: No    Vital Signs:   Vitals:    09/25/20 0045 09/25/20 0100 09/25/20 0115 09/25/20 0130   BP:  119/66  106/56   Pulse: 113 108 112 107   Resp: 12 21 19 22   Temp:       SpO2: 99% 96% 100% 98%       Cardiac Rhythm:     Was the PSS-3 completed:   Yes  What interventions are required if any?               Family Comments: No family at bedside. Pt's mother called for update.   OBS brochure/video discussed/provided to patient/family: No              Name of person given brochure if not patient:               Relationship to patient:     For the majority of the shift this patient's behavior was Green.   Behavioral interventions  performed were None.    ED NURSE PHONE NUMBER: *[phone]

## 2020-09-25 NOTE — UTILIZATION REVIEW
"  Admission Status; Secondary Review Determination         Under the authority of the Utilization Management Committee, the utilization review process indicated a secondary review on the above patient.  The review outcome is based on review of the medical records, discussions with staff, and applying clinical experience noted on the date of the review.        ()      Inpatient Status Appropriate - This patient's medical care is consistent with medical management for inpatient care and reasonable inpatient medical practice.      (x) Observation Status Appropriate - This patient does not meet hospital inpatient criteria and is placed in observation status. If this patient's primary payer is Medicare and was admitted as an inpatient, Condition Code 44 should be used and patient status changed to \"observation\".   () Admission Status NOT Appropriate - This patient's medical care is not consistent with medical management for Inpatient or Observation Status.          RATIONALE FOR DETERMINATION     Keyon Farias is a 60 year old male with multiple medical issues including recurrent aspiration pneumonia and hyponatremia who was admitted to observation status on 9/25/2020.  He presented with fever and cough and found to have suspected recurrent aspiration pneumonitis and hyponatremia.  He was admitted for IV antibiotics and close monitoring.  I spoke with Dr. Lujan who anticipates he will be stable for discharge tomorrow.        The severity of illness, intensity of service provided, expected LOS and risk for adverse outcome make the care complex, high risk and appropriate for hospital admission.        The information on this document is developed by the utilization review team in order for the business office to ensure compliance.  This only denotes the appropriateness of proper admission status and does not reflect the quality of care rendered.         The definitions of Inpatient Status and Observation Status used in " making the determination above are those provided in the CMS Coverage Manual, Chapter 1 and Chapter 6, section 70.4.      Sincerely,     Madison Ward MD  Physician Advisor   Utilization Review/ Case Management  [facility].

## 2020-09-26 VITALS
BODY MASS INDEX: 22.03 KG/M2 | RESPIRATION RATE: 18 BRPM | TEMPERATURE: 98.5 F | DIASTOLIC BLOOD PRESSURE: 65 MMHG | HEART RATE: 86 BPM | WEIGHT: 162.4 LBS | OXYGEN SATURATION: 96 % | SYSTOLIC BLOOD PRESSURE: 116 MMHG

## 2020-09-26 LAB
GLUCOSE BLDC GLUCOMTR-MCNC: 107 MG/DL (ref 70–99)
SODIUM SERPL-SCNC: 130 MMOL/L (ref 133–144)
SODIUM SERPL-SCNC: 130 MMOL/L (ref 133–144)

## 2020-09-26 PROCEDURE — 25000128 H RX IP 250 OP 636: Performed by: HOSPITALIST

## 2020-09-26 PROCEDURE — 96376 TX/PRO/DX INJ SAME DRUG ADON: CPT

## 2020-09-26 PROCEDURE — 99217 ZZC OBSERVATION CARE DISCHARGE: CPT | Performed by: INTERNAL MEDICINE

## 2020-09-26 PROCEDURE — 36415 COLL VENOUS BLD VENIPUNCTURE: CPT | Performed by: HOSPITALIST

## 2020-09-26 PROCEDURE — 25000132 ZZH RX MED GY IP 250 OP 250 PS 637: Mod: GY | Performed by: HOSPITALIST

## 2020-09-26 PROCEDURE — 25000132 ZZH RX MED GY IP 250 OP 250 PS 637: Mod: GY | Performed by: INTERNAL MEDICINE

## 2020-09-26 PROCEDURE — G0378 HOSPITAL OBSERVATION PER HR: HCPCS

## 2020-09-26 PROCEDURE — 00000146 ZZHCL STATISTIC GLUCOSE BY METER IP

## 2020-09-26 PROCEDURE — 84295 ASSAY OF SERUM SODIUM: CPT | Performed by: HOSPITALIST

## 2020-09-26 RX ORDER — AMOXICILLIN AND CLAVULANATE POTASSIUM 400; 57 MG/5ML; MG/5ML
875 POWDER, FOR SUSPENSION ORAL 2 TIMES DAILY
Qty: 109 ML | Refills: 0 | Status: SHIPPED | OUTPATIENT
Start: 2020-09-26 | End: 2020-10-01

## 2020-09-26 RX ADMIN — CARBAMAZEPINE 150 MG: 100 SUSPENSION ORAL at 00:01

## 2020-09-26 RX ADMIN — BRIVARACETAM 100 MG: 10 SOLUTION ORAL at 09:18

## 2020-09-26 RX ADMIN — CARBAMAZEPINE 150 MG: 100 SUSPENSION ORAL at 11:20

## 2020-09-26 RX ADMIN — POTASSIUM & SODIUM PHOSPHATES POWDER PACK 280-160-250 MG 1 PACKET: 280-160-250 PACK at 09:17

## 2020-09-26 RX ADMIN — SODIUM BICARBONATE 650 MG TABLET 650 MG: at 09:17

## 2020-09-26 RX ADMIN — PANTOPRAZOLE SODIUM 40 MG: 40 TABLET, DELAYED RELEASE ORAL at 09:17

## 2020-09-26 RX ADMIN — PIPERACILLIN SODIUM AND TAZOBACTAM SODIUM 4.5 G: 4; .5 INJECTION, POWDER, LYOPHILIZED, FOR SOLUTION INTRAVENOUS at 03:18

## 2020-09-26 RX ADMIN — PIPERACILLIN SODIUM AND TAZOBACTAM SODIUM 4.5 G: 4; .5 INJECTION, POWDER, LYOPHILIZED, FOR SOLUTION INTRAVENOUS at 09:18

## 2020-09-26 RX ADMIN — ASPIRIN 81 MG 81 MG: 81 TABLET ORAL at 09:17

## 2020-09-26 RX ADMIN — Medication 1 PACKET: at 11:20

## 2020-09-26 RX ADMIN — LEVOTHYROXINE SODIUM 150 MCG: 75 TABLET ORAL at 09:17

## 2020-09-26 RX ADMIN — HYDROCORTISONE 20 MG: 20 TABLET ORAL at 09:17

## 2020-09-26 RX ADMIN — CARBAMAZEPINE 150 MG: 100 SUSPENSION ORAL at 06:26

## 2020-09-26 RX ADMIN — ACETAMINOPHEN 650 MG: 325 TABLET, FILM COATED ORAL at 13:33

## 2020-09-26 RX ADMIN — POTASSIUM & SODIUM PHOSPHATES POWDER PACK 280-160-250 MG 1 PACKET: 280-160-250 PACK at 13:33

## 2020-09-26 RX ADMIN — PIPERACILLIN SODIUM AND TAZOBACTAM SODIUM 4.5 G: 4; .5 INJECTION, POWDER, LYOPHILIZED, FOR SOLUTION INTRAVENOUS at 13:34

## 2020-09-26 NOTE — PLAN OF CARE
Pt pleasant, nonverbal, HECTOR orientation. Spastic hemiplegia R side.VSS, afebrile, on RA. With repositioning pt gets SOB. LS diminished in lower bases, infrequent cough, unable to get sputum sample. T/R q 2hr/prn. NPO, tube feeding increased to goal rate 50 ml/hr. Meds/feeding via J tub. G tube open to gravity. Incontinent of bladder/bowel. Mepilex on coccyx, blanchable/nonblanchable erythema. Around penis area skin irritation noticed. No signs/symptoms of pain. Sodium checks scheduled q 6hr, last result 130. Contact iso maintained. Discharge pending.

## 2020-09-26 NOTE — PROGRESS NOTES
SW:  Discharge Planner   Discharge Plans in progress: discharge home to mother/guardian  Barriers to discharge plan: mother objecting to discharge today  Follow up plan: CHE scheduled [facility] stretcher ride per charge nurse request.    Transportation: [facility] at 1500 via stretcher. PCS completed, faxed and placed on chart/    1400: CHE called by MD and later by charge nurse with request to speak to patient's mother/caregiver/guardian who was expressing that she did not think that patient was ready to go yet. CHE met with mother, Savannah, to offer support and ask questions. Savannah stated, 'Why hasn't anyone called me to communicate what is going on? I don't think 'Mykel' is himself. He wouldn't give me a hug, give me the thumbs up sign and verbalize 'I love you' back to me which he always does.' This writer relayed back to patient what MD's concerns are about patient staying any longer. CHE also offered mother respite resources which she stated, 'No, I won't put him in LTC. The last one almost killed him.' Mother declined any type of respite resources. Savannah continued to balk at the discharge plan but stated, 'I guess I don't have a choice.'     CHE has identified no other social service needs at this time. CHE will remain available should other needs arise.      BAYRON Epperson  Daytime (8:00am-4:30pm): [phone]  After-Hours SW Pager (4:30pm-11:30pm): [phone]            Entered by: Nidhi Harry 09/26/2020 1:10 PM

## 2020-09-26 NOTE — DISCHARGE SUMMARY
[facility]  Hospitalist Discharge Summary      Date of Admission:  9/24/2020  Date of Discharge:  9/26/2020  Discharging Provider: Yaron Lujan DO      Discharge Diagnoses   1. Suspected recurrent aspiration pneumonitis  2. Mild acute on chronic hyponatremia  3. 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 Pan-hypopituitarism   5. Seizure disorder   6. Pancreatic tail cyst, known   7. Anemia, thrombocytopenia  8. Vulnerable adult     Follow-ups Needed After Discharge   Follow-up Appointments     Follow-up and recommended labs and tests       Follow up with primary care provider, Carlos Gomez, within 1-2 weeks,   for hospital follow- up. No follow up labs or test are needed.           Unresulted Labs Ordered in the Past 30 Days of this Admission     Date and Time Order Name Status Description    9/24/2020 2358 Blood culture Preliminary     9/24/2020 2358 Blood culture Preliminary       These results will be followed up by PCP     Discharge Disposition   Discharged to home  Condition at discharge: Stable    Hospital Course   Keyon Farias is a 60 year old gentleman with past medical history most notable for TBI leading to spastic hemiplegia as well as panhypopituitarism, seizure disorder, chronic dysphagia status post PEG tube placement, cholelithiasis, prior necrotizing pancreatitis with pseudocyst and multiple episodes of sepsis due to recurrent aspiration pneumonia  who presents with fever and cough and is found to have suspected recurrent aspiration pneumonitis and hyponatremia.     1) Suspected recurrent aspiration pneumonitis and hyponatremia: He has been hospitalized here many times in the past several years for recurrent sepsis due to recurrent aspiration pneumonia vs pneumonitis (he often presents with rapidly resolving severe sepsis). He has a G tube as managed by Dr. Davis of [facility]. TTE 4/2019 showed preserved LVEF without wall motion abnormalities and PFT's 9/2019  were indeterminate for possible restriction. Most recent CT 8/28/2020 showed chronic bilateral aspiration. Most recently he was hospitalized here from 8/28 through 8/31/2020 for recurrent aspiration, treated (as before many times) with Zosyn and Augmentin. COVID was negative. GI was consulted and Reglan increased and Azithroymcin added for suspected gastroparesis. Tube placement was verified. Since then it seems the tube was successfully exchanged by IR on 9/17/2020. Now he returns for recurrent cough and fever and also has lethargy concerning for acute metabolic encephalopathy. We suspect ongoing aspiration. There are at present no signs of recurrent SIRS or sepsis beyond low grade fever and mild leukocytosis.    -- Observation    -- Empiric Zosyn switched to Augmentin liquid at discharge     -- Anti-tussives and prn Albuterol inhalers ordered.    -- Note that multiple prior care goal discussions have been held with his mother who takes care of him at home. He is Full Code    Vulnerable adult  Patient is admitted quite frequently to hospital for aspirations.  I spoke to IR and they stated that if the J-tube port was being used as directed the patient should not be having issues with aspiration/feeding tube.  Social work consulted and a vulnerable adult report placed as I question if the patient's mother is still able to manage taking care of him or if there is secondary gain involved.  Patient continues to decline yet mother (POA) is still adamant about restorative cares and full code.  She is also not willing to consider placement for the patient at this time.      Also, recommend on next hospitalization to consider ID consult to help establish an ED care plan for his frequent ED admissions.       Consultations This Hospital Stay   CARE COORDINATOR IP CONSULT  SOCIAL WORK IP CONSULT  NEUROLOGY IP CONSULT  SOCIAL WORK IP CONSULT  NUTRITION SERVICES ADULT IP CONSULT  PHARMACY IP CONSULT    Code Status   Full  Code    Time Spent on this Encounter   I, Yaron Lujan DO, personally saw the patient today and spent greater than 30 minutes discharging this patient.       Yaron Lujan DO  [facility]  ______________________________________________________________________    Physical Exam   Vital Signs: Temp: 98.8  F (37.1  C) Temp src: Axillary BP: 129/55 Pulse: 86   Resp: 16 SpO2: 96 % O2 Device: None (Room air)    Weight: 162 lbs 6.4 oz  General Appearance: Non-verbal.  Will make eye contact when spoken too  Respiratory: Clear to auscultation.  No respiratory distress  Cardiovascular: RRR  GI: Bowel sounds noted.  Feeding tube in place  Skin: No obvious rashes.  No cyanosis  Other: No lower extremity edema        Primary Care Physician   Carlos Gomez    Discharge Orders      Home care nursing referral      Reason for your hospital stay    Aspiration pneumonia vs pneumonitis     Follow-up and recommended labs and tests     Follow up with primary care provider, Carlos Gomez, within 1-2 weeks, for hospital follow- up. No follow up labs or test are needed.     Activity    Your activity upon discharge: activity as tolerated     Diet    Follow this diet upon discharge: Orders Placed This Encounter      Adult Formula Drip Feeding: Continuous Isosource 1.5; Jejunostomy; Goal Rate: 50; mL/hr; Medication - Feeding Tube Flush Frequency: At least 15-30 mL water before and after medication administration and with tube clogging; Begin @ 25 ml/hr. Advanc...      NPO for Medical/Clinical Reasons       Significant Results and Procedures   Most Recent 3 CBC's:  Recent Labs   Lab Test 09/25/20  0636 09/25/20  0002 08/31/20  0612   WBC 17.6* 11.1* 7.3   HGB 11.2* 13.2* 11.5*   MCV 87 86 88   * 142* 271     Most Recent 3 BMP's:  Recent Labs   Lab Test 09/26/20  1047 09/26/20  0351 09/25/20  2247  09/25/20  0636 09/25/20  0002 08/31/20  0612   * 130* 129*   < > 124* 122* 134   POTASSIUM  --   --   --   --   4.0 3.6 3.7   CHLORIDE  --   --   --   --  94 87* 102   CO2  --   --   --   --  23 28 26   BUN  --   --   --   --  12 13 13   CR  --   --   --   --  0.75 0.71 0.72   ANIONGAP  --   --   --   --  7 7 6   STEVE  --   --   --   --  7.0* 7.8* 8.6   GLC  --   --   --   --  80 80 102*    < > = values in this interval not displayed.     Most Recent 6 Bacteria Isolates From Any Culture (See EPIC Reports for Culture Details):  Recent Labs   Lab Test 09/25/20  0103 09/25/20  0004 08/28/20  0314 08/28/20  0250 08/21/20  0215 08/10/20  2235   CULT No growth after 1 day No growth after 1 day Cultured on the 1st day of incubation:  Staphylococcus hominis  *  Critical Value/Significant Value, preliminary result only, called to and read back by  Bienvenido Cui RN at 0259 on 8.29.20 by SS.     (Note)  POSITIVE for Staphylococci other than S.aureus, S.epidermidis and  S.lugdunensis, by Verigene multiplex nucleic acid test.  Coagulase-negative staphylococci are the most common venipuncture or  collection associated skin CONTAMINANTS grown in blood cultures.  Final identification and antimicrobial susceptibility testing will be  verified by standard methods.    Specimen tested with Verigene multiplex, gram-positive blood culture  nucleic acid test for the following targets: Staph aureus, Staph  epidermidis, Staph lugdunensis, other Staph species, Enterococcus  faecalis, Enterococcus faecium, Streptococcus species, S. agalactiae,  S. anginosus grp., S. pneumoniae, S. pyogenes, Listeria sp., mecA  (methicillin resistance) and Alberto/B (vancomycin resistance).    Critical Value/Significant Value called to and read back by Bienvenido Cui RN at 0536 8/29/20 hg   No growth No growth  No growth No growth   ,   Results for orders placed or performed during the hospital encounter of 09/24/20   XR Chest Port 1 View    Narrative    EXAM: XR CHEST PORT 1 VW  LOCATION: [facility]  DATE/TIME: 9/25/2020 12:10 AM    INDICATION: Cough, fever,  history of aspiration  COMPARISON: 08/21/2020      Impression    IMPRESSION: There is mild bibasilar atelectasis and consolidation similar to comparison examination. There is mild elevation of the right hemidiaphragm. No pneumothorax. No pleural effusion. Normal heart size.       Discharge Medications   Current Discharge Medication List      START taking these medications    Details   amoxicillin-clavulanate (AUGMENTIN) 875-125 MG tablet 1 tablet by Per J Tube route 2 times daily for 5 days  Qty: 10 tablet, Refills: 0    Associated Diagnoses: Aspiration pneumonia, unspecified aspiration pneumonia type, unspecified laterality, unspecified part of lung (H)         CONTINUE these medications which have NOT CHANGED    Details   acetylcysteine (MUCOMYST) 20 % neb solution Take 2 mLs by nebulization 4 times daily With albuterol at 0700, 1100, 1500, and 1900       albuterol (PROVENTIL) (5 MG/ML) 0.5% neb solution Take 2.5 mg by nebulization every 4 hours (while awake) 0700 1100 1500 1900 with mucomyst       aspirin (ASA) 81 MG chewable tablet 81 mg by Oral or Feeding Tube route daily At 0900      azithromycin (ZITHROMAX) 200 MG/5ML suspension Take 200 mg by mouth daily      bacitracin ointment Apply topically daily as needed for wound care To PEG site.       Brivaracetam (BRIVIACT) 10 MG/ML solution 100 mg by Oral or Feeding Tube route 2 times daily 0900, 2100      calcium carbonate 1250 (500 CA) MG/5ML SUSP suspension 5 mLs (1,250 mg) by Per J Tube route 3 times daily (with meals)  Qty: 450 mL    Associated Diagnoses: Malnutrition (H)      carBAMazepine (TEGRETOL) 100 MG/5ML suspension 150 mg by Oral or Feeding Tube route every 6 hours At 06:00, 12:00, 18:00 and 24:00 for seizures       clotrimazole-betamethasone (LOTRISONE) 1-0.05 % external cream Apply topically 2 times daily Please give rest of bottle to bring home.  Qty:        ferrous sulfate 220 (44 Fe) MG/5ML ELIX 220 mg by Per Feeding Tube route daily      Guar  Gum (FIBER MODULAR, NUTRISOURCE FIBER,) packet 1 packet by Per G Tube route daily  Qty: 30 packet, Refills: 0    Associated Diagnoses: Pneumonia of both lower lobes due to infectious organism      !! hydrocortisone (CORTEF) 5 MG tablet 10 mg by Oral or FT or NG tube route daily (with dinner) At 1500      !! hydrocortisone (CORTEF) 5 MG tablet 20 mg by Oral or FT or NG tube route every morning       hydrocortisone 1 % CREA cream Place rectally 2 times daily as needed for other Apply to reddened memo areas as needed      levothyroxine (SYNTHROID/LEVOTHROID) 150 MCG tablet Take 150 mcg by mouth every morning      melatonin (MELATONIN) 1 MG/ML LIQD liquid 6 mg by Per NG tube route At Bedtime       metoclopramide (REGLAN) 5 MG/5ML solution 10 mLs (10 mg) by Per Feeding Tube route 4 times daily (before meals and nightly)  Qty: 473 mL, Refills: 0    Associated Diagnoses: Gastroparesis      miconazole (MICATIN) 2 % AERP powder Apply topically 2 times daily as needed       Multiple Vitamins-Minerals (EMERGEN-C VITAMIN C) PACK 1 packet by Oral or Feeding Tube route daily Vitamin C 1000 mg      mupirocin (BACTROBAN) 2 % external ointment Apply topically 2 times daily as needed       pantoprazole (PROTONIX) 2 mg/mL SUSP suspension 20 mLs (40 mg) by Per J Tube route daily  Qty: 400 mL, Refills: 0    Comments: Future refills by PCP Dr. Carlos Gomez with phone number [phone].  Associated Diagnoses: Gastroesophageal reflux disease, esophagitis presence not specified      potassium & sodium phosphates (NEUTRA-PHOS) 280-160-250 MG Packet Take 1 packet by mouth 3 times daily       prochlorperazine (COMPAZINE) 25 MG suppository Place 1 suppository (25 mg) rectally every 12 hours as needed for nausea or vomiting  Qty: 15 suppository, Refills: 0    Comments: Future refills by PCP Dr. Carlos Gomez with phone number [phone].  Associated Diagnoses: Aspiration pneumonia of right middle lobe, unspecified aspiration pneumonia type  (H)      Scopolamine HBr POWD Dispense #90. Mix contents with small amount of water for admin via J-tube.  Administer 0.8 mg three times each day.  Qty: 90 Bottle, Refills: 11    Associated Diagnoses: Aspiration pneumonia (H)      Skin Protectants, Misc. (BALMEX SKIN PROTECTANT) OINT Externally apply topically 2 times daily as needed (irritation) Applay to reddened memo areas twice daily as needed      sodium bicarbonate 650 MG tablet Take 1 tablet (650 mg) by mouth daily  Qty: 30 tablet, Refills: 0    Associated Diagnoses: Hyponatremia      testosterone cypionate (DEPOTESTOTERONE CYPIONATE) 200 MG/ML injection Inject 76 mg into the muscle See Admin Instructions Every 2 weeks on Thursdays  76 mg or 0.38 mL      vitamin D3 (CHOLECALCIFEROL) 2000 units (50 mcg) tablet Take 2,000 Units by mouth daily Crush and feed via j-tube @@ 0900      order for DME Equipment being ordered: Nebulizer  Qty: 1 Units, Refills: 0    Associated Diagnoses: Pneumonia of both lower lobes due to infectious organism       !! - Potential duplicate medications found. Please discuss with provider.        Allergies   Allergies   Allergen Reactions     Dilantin [Phenytoin Sodium]      Scopolamine Hives     Hives in response to scopolamine PATCH      Valproic Acid      Toxicity c bone marrow suspension, elevated ammonia levels

## 2020-09-26 NOTE — PROGRESS NOTES
Observation goals  PRIOR TO DISCHARGE     Comments: -  diagnostic tests and consults completed and resulted : Not met    -vital signs normal or at patient baseline : Met    -returns to baseline functional status : Met    Nurse to notify provider when observation goals have been met and patient is ready for discharge.

## 2020-09-26 NOTE — PLAN OF CARE
PT discharging home at this time via [facility] transport using a stretcher. AVS, meds and education given to pt's Mother. GJ patent.Questions answered.

## 2020-10-05 ENCOUNTER — HOSPITAL ENCOUNTER (OUTPATIENT)
Dept: LAB | Facility: CLINIC | Age: 60
Discharge: HOME | End: 2020-10-05
Attending: PSYCHIATRY & NEUROLOGY | Admitting: PSYCHIATRY & NEUROLOGY
Payer: MEDICARE

## 2020-10-05 DIAGNOSIS — Z79.899 ENCOUNTER FOR LONG-TERM (CURRENT) USE OF OTHER MEDICATIONS: ICD-10-CM

## 2020-10-05 DIAGNOSIS — M62.40 MUSCLE CONTRACTURE: ICD-10-CM

## 2020-10-05 DIAGNOSIS — G40.011 LOCALIZATION-RELATED IDIOPATHIC EPILEPSY AND EPILEPTIC SYNDROMES WITH SEIZURES OF LOCALIZED ONSET, INTRACTABLE, WITH STATUS EPILEPTICUS (H): ICD-10-CM

## 2020-10-05 LAB
ALBUMIN SERPL-MCNC: 3.3 G/DL (ref 3.4–5)
ALP SERPL-CCNC: 118 U/L (ref 40–150)
ALT SERPL W P-5'-P-CCNC: 31 U/L (ref 0–70)
ANION GAP SERPL CALCULATED.3IONS-SCNC: 7 MMOL/L (ref 3–14)
AST SERPL W P-5'-P-CCNC: 21 U/L (ref 0–45)
BILIRUB SERPL-MCNC: 0.3 MG/DL (ref 0.2–1.3)
BUN SERPL-MCNC: 19 MG/DL (ref 7–30)
CALCIUM SERPL-MCNC: 8.3 MG/DL (ref 8.5–10.1)
CARBAMAZEPINE SERPL-MCNC: 15.9 MG/L (ref 4–12)
CHLORIDE SERPL-SCNC: 94 MMOL/L (ref 94–109)
CO2 SERPL-SCNC: 29 MMOL/L (ref 20–32)
CREAT SERPL-MCNC: 0.78 MG/DL (ref 0.66–1.25)
ERYTHROCYTE [DISTWIDTH] IN BLOOD BY AUTOMATED COUNT: 13.9 % (ref 10–15)
GFR SERPL CREATININE-BSD FRML MDRD: >90 ML/MIN/{1.73_M2}
GLUCOSE SERPL-MCNC: 142 MG/DL (ref 70–99)
HCT VFR BLD AUTO: 41.1 % (ref 40–53)
HGB BLD-MCNC: 14 G/DL (ref 13.3–17.7)
MCH RBC QN AUTO: 29.9 PG (ref 26.5–33)
MCHC RBC AUTO-ENTMCNC: 34.1 G/DL (ref 31.5–36.5)
MCV RBC AUTO: 88 FL (ref 78–100)
PLATELET # BLD AUTO: 290 10E9/L (ref 150–450)
POTASSIUM SERPL-SCNC: 3.8 MMOL/L (ref 3.4–5.3)
PROT SERPL-MCNC: 8.3 G/DL (ref 6.8–8.8)
RBC # BLD AUTO: 4.68 10E12/L (ref 4.4–5.9)
SODIUM SERPL-SCNC: 130 MMOL/L (ref 133–144)
WBC # BLD AUTO: 13 10E9/L (ref 4–11)

## 2020-10-05 PROCEDURE — 85027 COMPLETE CBC AUTOMATED: CPT | Performed by: PSYCHIATRY & NEUROLOGY

## 2020-10-05 PROCEDURE — 36415 COLL VENOUS BLD VENIPUNCTURE: CPT | Performed by: PSYCHIATRY & NEUROLOGY

## 2020-10-05 PROCEDURE — 80156 ASSAY CARBAMAZEPINE TOTAL: CPT | Performed by: PSYCHIATRY & NEUROLOGY

## 2020-10-05 PROCEDURE — 80053 COMPREHEN METABOLIC PANEL: CPT | Performed by: PSYCHIATRY & NEUROLOGY

## 2020-10-14 ENCOUNTER — APPOINTMENT (OUTPATIENT)
Dept: INTERVENTIONAL RADIOLOGY/VASCULAR | Facility: CLINIC | Age: 60
End: 2020-10-14
Attending: NURSE PRACTITIONER
Payer: MEDICARE

## 2020-10-14 ENCOUNTER — HOSPITAL ENCOUNTER (OUTPATIENT)
Facility: CLINIC | Age: 60
Discharge: HOME | End: 2020-10-14
Attending: RADIOLOGY | Admitting: RADIOLOGY
Payer: MEDICARE

## 2020-10-14 VITALS
TEMPERATURE: 97.2 F | BODY MASS INDEX: 21.94 KG/M2 | HEART RATE: 77 BPM | SYSTOLIC BLOOD PRESSURE: 106 MMHG | RESPIRATION RATE: 16 BRPM | OXYGEN SATURATION: 96 % | WEIGHT: 162 LBS | DIASTOLIC BLOOD PRESSURE: 65 MMHG | HEIGHT: 72 IN

## 2020-10-14 DIAGNOSIS — R13.10 DYSPHAGIA, UNSPECIFIED TYPE: ICD-10-CM

## 2020-10-14 PROCEDURE — 272N000584 ZZ HC TUBE GASTRO CR13

## 2020-10-14 PROCEDURE — 49452 REPLACE G-J TUBE PERC: CPT

## 2020-10-14 PROCEDURE — 999N000163 HC STATISTIC SIMPLE TUBE INSERTION/CHARGE, PORT, CATH, FISTULOGRAM

## 2020-10-14 PROCEDURE — C1769 GUIDE WIRE: HCPCS

## 2020-10-14 ASSESSMENT — MIFFLIN-ST. JEOR: SCORE: 1592.83

## 2020-10-14 NOTE — PROCEDURES
[facility]    Procedure: 18 Fr GJ exchange    Date/Time: 10/14/2020 2:37 PM  Performed by: Brandon Villafana MD  Authorized by: Brandon Villafana MD     UNIVERSAL PROTOCOL   Site Marked: NA  Prior Images Obtained and Reviewed:  Yes  Required items: Required blood products, implants, devices and special equipment available    Patient identity confirmed:  Verbally with patient, arm band, provided demographic data and hospital-assigned identification number  Patient was reevaluated immediately before administering moderate or deep sedation or anesthesia  Confirmation Checklist:  Patient's identity using two indicators, relevant allergies, procedure was appropriate and matched the consent or emergent situation and correct equipment/implants were available  Time out: Immediately prior to the procedure a time out was called    Universal Protocol: the Joint Commission Universal Protocol was followed    Preparation: Patient was prepped and draped in usual sterile fashion    ESBL (mL):  2         ANESTHESIA    Anesthesia: Local infiltration  Local Anesthetic:  Lidocaine 1% without epinephrine      SEDATION    Patient Sedated: No    See dictated procedure note for full details.  Findings: 18 Fr TORY GJ Tube exchange.  Tube is ready for immediate use.  No complications.  Ready for use, as before.    Specimens: none    Complications: None    Condition: Stable    PROCEDURE   Patient Tolerance:  Patient tolerated the procedure well with no immediate complications    Length of time physician/provider present for 1:1 monitoring during sedation: 0

## 2020-10-14 NOTE — PROGRESS NOTES
discharge to home with mother.  New tube in place WDL, no dressing.  discharge instructions given to pt;s mother.  No sedation given during procedure.

## 2020-10-14 NOTE — DISCHARGE INSTRUCTIONS
G-tube Exchange Discharge Instructions     After you go home:      You may resume your normal diet    Care of Insertion Site:      For the first 48 hrs, check your puncture site every couple hours while you are awake     You may remove/change the dressing tomorrow    You may shower tomorrow    No tub baths, whirlpools or swimming until your puncture site has fully healed     Activity       You may go back to normal activity in 24 hours    Wait 48 hours before lifting, straining, exercise or other strenuous activity    Medicines:      You may resume all medications    Resume your Warfarin/Coumadin at your regular dose today. Follow up with your provider to have your INR rechecked    Resume your Platelet Inhibitors and Aspirin tomorrow at your regular dose    For minor pain, you may take Acetaminophen (Tylenol) or Ibuprofen (Advil)                 Call the provider who ordered this procedure if:      Blood or fluid is draining from the site    The site is red, swollen, hot, tender or there is foul-smelling drainage    Chills or a fever greater than 101 F (38 C)    Increased pain at the site    Any questions or concerns    Call  911 or go to the Emergency Room if:      Severe pain or trouble breathing    Bleeding that you cannot control      If you have questions call:          [facility] Radiology Dept @ [phone]        The provider who performed your procedure was _________________.        Caring for your G-tube    Tube Maintenance:    For ENFit Tubes:      Do NOT over tighten the connections (the purple end & hub connection).      Clean the connecting ends (especially the hub) every day.      If you are unable to disconnect the tubing ends, soak the connection in warm water (or wrap in a wet warm washcloth) to try and loosen the connection.    Possible problems with your tube may include:      Clogged with medications or feedings - most obstructions can be cleared with a small (3cc) syringe and warm  water. This may be repeated until the tube is unclogged. This can be prevented by frequently flushing the tube with water (60cc) during the day and always after medications & feedings.      Tube pulls out or falls out -cover the opening with gauze & tape. Call [phone] for further instructions      Skin breakdown and/or yeast infections at the insertion site - use of skin barrier ointments and anti-fungal powders can treat most site irritations.  Ask your pharmacist or provider for assistance (a prescription is not necessary).    In general, tube problems (including pulled tubes) are NOT emergency situations. Unless the pulling out of a tube is accompanied by uncontrollable bleeding, please DO NOT GO TO THE EMERGENCY ROOM!  Call [phone] with problems.    Tube Care:      Change the gauze dressing every 24 hours and if soiled (dirty).  Stabilize all tubes securely by using gauze and tape.  Clean tube site with soap & water using a cotton applicator (Q-tip) as needed to prevent irritation.    Flush feeding tube with 60cc of warm or room temperature water before and after meds.  To prevent the tube from clogging, ask your provider or pharmacist if liquid forms of your medications are available. If not, crush the pills well & be sure to flush the tube before & after all medications.    Flush feeding tube a minimum of every 4 hours and when feeding is completed with 60cc of water to keep the tube clear and avoid clogging.    Pt may use an abdominal (waist) binder to protect the G-tube.    If there is continued oozing or bleeding, redness, yellow/green/foul smelling drainage    STOP the feedings & use of the tube immediately if there is:      Continued oozing or bleeding at the site    Redness    Yellow/green or foul smelling drainage at the site    Uncontrolled stomach pain    Many of the supplies mentioned above can be purchased at your local pharmacy      For issues with your tube, please call:    [facility]  Inter[facility]iology Dept at [phone]

## 2020-10-14 NOTE — PROGRESS NOTES
Care Suites Admission Nursing Note    Reason for admission: GT Exchange  CS arrival time: 1120    Accompanied by: Mom    Name/phone of DC : Savannah    Medications held: N/A  Consent signed: Yes    Education/questions answered: Procedure explained. All questions & concerns addressed.    Plan: home with Mom & Caregiver post procedure    A/O. Denies pain. Procedure explained. All questions & concerns addressed.  Pt resting on cart, denies additional needs at this time, call light within reach. Family at bedside.      1215 Report given to Megan Aguilar RN.

## 2020-10-14 NOTE — PROGRESS NOTES
PATIENT/VISITOR WELLNESS SCREENING    Step 1 Patient Screening    1. In the last month, have you been in contact with someone who was confirmed or suspected to have Coronavirus/COVID-19? No    2. Do you have the following symptoms?  Fever/Chills? No   Cough? No   Shortness of breath? No   New loss of taste or smell? No  Sore throat? No  Muscle or body aches? No  Headaches? No  Fatigue? No  Vomiting or diarrhea? No    Step 2 Visitor Screening    1. Name of Visitor (1 visitor per patient): Savannah Bright    2. In the last month, have you been in contact with someone who was confirmed or suspected to have Coronavirus/COVID-19? No    3. Do you have the following symptoms?  Fever/Chills? No   Cough? No   Shortness of breath? No   Skin rash? No   Loss of taste or smell? No  Sore throat? No  Runny or stuffy nose? No  Muscle or body aches? No  Headaches? No  Fatigue? No  Vomiting or diarrhea? No    If the visitor has positive symptoms, notify supervisor/manger  Per policy, the visitor will need to leave the facility

## 2020-10-16 NOTE — PLAN OF CARE
Future Appointments   Date Time Provider Jose Luis Russo   11/11/2020 12:45 PM MD margie Li derm [id number] Problem: Goal Outcome Summary  Goal: Goal Outcome Summary  PT: pt is OR today, CX PT session for today. Pt is scheduled for tomorrow.

## 2020-10-23 ENCOUNTER — HOSPITAL ENCOUNTER (OUTPATIENT)
Facility: CLINIC | Age: 60
Discharge: HOME | End: 2020-10-23
Attending: RADIOLOGY | Admitting: RADIOLOGY
Payer: MEDICARE

## 2020-10-23 ENCOUNTER — APPOINTMENT (OUTPATIENT)
Dept: INTERVENTIONAL RADIOLOGY/VASCULAR | Facility: CLINIC | Age: 60
End: 2020-10-23
Attending: RADIOLOGY
Payer: MEDICARE

## 2020-10-23 VITALS
RESPIRATION RATE: 16 BRPM | OXYGEN SATURATION: 97 % | SYSTOLIC BLOOD PRESSURE: 113 MMHG | HEART RATE: 67 BPM | WEIGHT: 160 LBS | DIASTOLIC BLOOD PRESSURE: 64 MMHG | TEMPERATURE: 95.8 F | HEIGHT: 72 IN | BODY MASS INDEX: 21.67 KG/M2

## 2020-10-23 DIAGNOSIS — R13.10 DYSPHAGIA, UNSPECIFIED TYPE: ICD-10-CM

## 2020-10-23 PROCEDURE — 49465 FLUORO EXAM OF G/COLON TUBE: CPT

## 2020-10-23 PROCEDURE — C1769 GUIDE WIRE: HCPCS

## 2020-10-23 PROCEDURE — 272N000584 ZZ HC TUBE GASTRO CR13

## 2020-10-23 PROCEDURE — 999N000163 HC STATISTIC SIMPLE TUBE INSERTION/CHARGE, PORT, CATH, FISTULOGRAM

## 2020-10-23 ASSESSMENT — MIFFLIN-ST. JEOR: SCORE: 1583.76

## 2020-10-23 NOTE — PROGRESS NOTES
PATIENT/VISITOR WELLNESS SCREENING    Step 1 Patient Screening    1. In the last month, have you been in contact with someone who was confirmed or suspected to have Coronavirus/COVID-19? No    2. Do you have the following symptoms?  Fever/Chills? No   Cough? No   Shortness of breath? No   New loss of taste or smell? No  Sore throat? No  Muscle or body aches? No  Headaches? No  Fatigue? No  Vomiting or diarrhea? No    Step 2 Visitor Screening    1. Name of Visitor (1 visitor per patient): Sharlene    2. In the last month, have you been in contact with someone who was confirmed or suspected to have Coronavirus/COVID-19? No    3. Do you have the following symptoms?  Fever/Chills? No   Cough? No   Shortness of breath? No   Skin rash? No   Loss of taste or smell? No  Sore throat? No  Runny or stuffy nose? No  Muscle or body aches? No  Headaches? No  Fatigue? No  Vomiting or diarrhea? No      If the visitor has positive symptoms, notify supervisor/manger  Per policy, the visitor will need to leave the facility     Step 3 Refer to logic grid below for actions    NO SYMPTOM(S)    ACTIONS:  1. Standard rooming process  2. Provider to assess per normal protocol  3. Implement precautions as needed and per guidelines     POSITIVE SYMPTOM(S)  If positive for ANY of the following symptoms: fever, cough, shortness of breath, rash    ACTION:  1. Continue to have the patient wear a mask   2. Room patient as soon as possible  3. Don appropriate PPE when entering room  4. Provider evaluation      Care Suites Admission Nursing Note    Patient Information  Name: Keyon Farias  Age: 60 year old  Reason for admission:  G tube unplug  Care Suites arrival time: 1400    Visitor Information  Name: Savannah  Informed of visitor restrictions: Yes  1 visitor allowed per patient   Visitor must screen negative for COVID symptoms   Visitor must wear a mask  Waiting rooms closed to visitors    Patient Admission/Assessment   Pre-procedure assessment  complete: Yes  If abnormal assessment/labs, provider notified: N/A  NPO: N/A  Medications held per instructions/orders: N/A  Consent: na  If applicable, pregnancy test status: na  Patient oriented to room: Yes  Education/questions answered: Yes  Plan/other: monitor    Discharge Planning  Discharge name/phone number: mother in room withpt.  Overnight post sedation caregiver: mother  Discharge location: home    Sammy Humphrey RN

## 2020-10-23 NOTE — DISCHARGE SUMMARY
Care Suites Discharge Nursing Note    Patient Information  Name: Keyon Farias  Age: 60 year old    Discharge Education:  Discharge instructions reviewed: Yes  Additional education/resources provided: sent home with supplies for gj tube  Patient/patient representative verbalizes understanding: Yes  Patient discharging on new medications: No  Medication education completed: N/A    Discharge Plans:   Discharge location: home  Discharge ride contacted: Yes  Approximate discharge time: 1600    Discharge Criteria:  Discharge criteria met and vital signs stable: Yes    Patient Belongs:  Patient belongings returned to patient: Yes    Reji Goddard RN     GJ tube flushed - no intervention

## 2020-10-23 NOTE — DISCHARGE INSTRUCTIONS
G-tube Exchange Discharge Instructions     After you go home:      You may resume your normal diet    Care of Insertion Site:      For the first 48 hrs, check your puncture site every couple hours while you are awake     You may remove/change the dressing tomorrow    You may shower tomorrow    No tub baths, whirlpools or swimming until your puncture site has fully healed     Activity       You may go back to normal activity in 24 hours    Wait 48 hours before lifting, straining, exercise or other strenuous activity    Medicines:      You may resume all medications    Resume your Warfarin/Coumadin at your regular dose today. Follow up with your provider to have your INR rechecked    Resume your Platelet Inhibitors and Aspirin tomorrow at your regular dose    For minor pain, you may take Acetaminophen (Tylenol) or Ibuprofen (Advil)                 Call the provider who ordered this procedure if:      Blood or fluid is draining from the site    The site is red, swollen, hot, tender or there is foul-smelling drainage    Chills or a fever greater than 101 F (38 C)    Increased pain at the site    Any questions or concerns    Call  911 or go to the Emergency Room if:      Severe pain or trouble breathing    Bleeding that you cannot control      If you have questions call:          [facility] Radiology Dept @ [phone]      Caring for your G-tube    Tube Maintenance:    For ENFit Tubes:      Do NOT over tighten the connections (the purple end & hub connection).      Clean the connecting ends (especially the hub) every day.      If you are unable to disconnect the tubing ends, soak the connection in warm water (or wrap in a wet warm washcloth) to try and loosen the connection.    Possible problems with your tube may include:      Clogged with medications or feedings - most obstructions can be cleared with a small (3cc) syringe and warm water. This may be repeated until the tube is unclogged. This can be prevented  by frequently flushing the tube with water (60cc) during the day and always after medications & feedings.      Tube pulls out or falls out -cover the opening with gauze & tape. Call [phone] for further instructions      Skin breakdown and/or yeast infections at the insertion site - use of skin barrier ointments and anti-fungal powders can treat most site irritations.  Ask your pharmacist or provider for assistance (a prescription is not necessary).    In general, tube problems (including pulled tubes) are NOT emergency situations. Unless the pulling out of a tube is accompanied by uncontrollable bleeding, please DO NOT GO TO THE EMERGENCY ROOM!  Call [phone] with problems.    Tube Care:      Change the gauze dressing every 24 hours and if soiled (dirty).  Stabilize all tubes securely by using gauze and tape.  Clean tube site with soap & water using a cotton applicator (Q-tip) as needed to prevent irritation.    Flush feeding tube with 60cc of warm or room temperature water before and after meds.  To prevent the tube from clogging, ask your provider or pharmacist if liquid forms of your medications are available. If not, crush the pills well & be sure to flush the tube before & after all medications.    Flush feeding tube a minimum of every 4 hours and when feeding is completed with 60cc of water to keep the tube clear and avoid clogging.    Pt may use an abdominal (waist) binder to protect the G-tube.    If there is continued oozing or bleeding, redness, yellow/green/foul smelling drainage    STOP the feedings & use of the tube immediately if there is:      Continued oozing or bleeding at the site    Redness    Yellow/green or foul smelling drainage at the site    Uncontrolled stomach pain    Many of the supplies mentioned above can be purchased at your local pharmacy      For issues with your tube, please call:    [facility] Radiology Dept at [phone]

## 2020-10-25 ENCOUNTER — APPOINTMENT (OUTPATIENT)
Dept: GENERAL RADIOLOGY | Facility: CLINIC | Age: 60
DRG: 871 | End: 2020-10-25
Attending: INTERNAL MEDICINE
Payer: MEDICARE

## 2020-10-25 ENCOUNTER — APPOINTMENT (OUTPATIENT)
Dept: GENERAL RADIOLOGY | Facility: CLINIC | Age: 60
End: 2020-10-25
Attending: EMERGENCY MEDICINE
Payer: MEDICARE

## 2020-10-25 ENCOUNTER — HOSPITAL ENCOUNTER (EMERGENCY)
Facility: CLINIC | Age: 60
Discharge: STILL ADMITTED OR ADMINISTRATIVE | End: 2020-10-25
Attending: EMERGENCY MEDICINE | Admitting: EMERGENCY MEDICINE
Payer: MEDICARE

## 2020-10-25 ENCOUNTER — APPOINTMENT (OUTPATIENT)
Dept: GENERAL RADIOLOGY | Facility: CLINIC | Age: 60
DRG: 871 | End: 2020-10-25
Attending: STUDENT IN AN ORGANIZED HEALTH CARE EDUCATION/TRAINING PROGRAM
Payer: MEDICARE

## 2020-10-25 ENCOUNTER — HOSPITAL ENCOUNTER (INPATIENT)
Facility: CLINIC | Age: 60
LOS: 12 days | Discharge: HOME HEALTH | DRG: 871 | End: 2020-11-06
Attending: INTERNAL MEDICINE | Admitting: INTERNAL MEDICINE
Payer: MEDICARE

## 2020-10-25 ENCOUNTER — APPOINTMENT (OUTPATIENT)
Dept: CT IMAGING | Facility: CLINIC | Age: 60
End: 2020-10-25
Attending: EMERGENCY MEDICINE
Payer: MEDICARE

## 2020-10-25 VITALS
TEMPERATURE: 101.7 F | OXYGEN SATURATION: 100 % | WEIGHT: 151.8 LBS | SYSTOLIC BLOOD PRESSURE: 105 MMHG | HEART RATE: 126 BPM | RESPIRATION RATE: 19 BRPM | DIASTOLIC BLOOD PRESSURE: 71 MMHG | BODY MASS INDEX: 20.59 KG/M2

## 2020-10-25 DIAGNOSIS — R65.21 SEPTIC SHOCK (H): Primary | ICD-10-CM

## 2020-10-25 DIAGNOSIS — J96.01 ACUTE RESPIRATORY FAILURE WITH HYPOXIA (H): ICD-10-CM

## 2020-10-25 DIAGNOSIS — N17.9 ACUTE RENAL FAILURE, UNSPECIFIED ACUTE RENAL FAILURE TYPE (H): ICD-10-CM

## 2020-10-25 DIAGNOSIS — E23.2 DIABETES INSIPIDUS (H): ICD-10-CM

## 2020-10-25 DIAGNOSIS — A41.9 SEPTIC SHOCK (H): Primary | ICD-10-CM

## 2020-10-25 DIAGNOSIS — A41.9 SEPTIC SHOCK (H): ICD-10-CM

## 2020-10-25 DIAGNOSIS — J69.0 ASPIRATION PNEUMONIA OF BOTH LOWER LOBES, UNSPECIFIED ASPIRATION PNEUMONIA TYPE (H): ICD-10-CM

## 2020-10-25 DIAGNOSIS — R65.21 SEPTIC SHOCK (H): ICD-10-CM

## 2020-10-25 DIAGNOSIS — E23.0 PANHYPOPITUITARISM (H): ICD-10-CM

## 2020-10-25 LAB
ALBUMIN SERPL-MCNC: 2.1 G/DL (ref 3.4–5)
ALBUMIN SERPL-MCNC: 2.2 G/DL (ref 3.4–5)
ALBUMIN UR-MCNC: 10 MG/DL
ALP SERPL-CCNC: 72 U/L (ref 40–150)
ALP SERPL-CCNC: 83 U/L (ref 40–150)
ALT SERPL W P-5'-P-CCNC: 15 U/L (ref 0–70)
ALT SERPL W P-5'-P-CCNC: 15 U/L (ref 0–70)
AMMONIA PLAS-SCNC: 25 UMOL/L (ref 10–50)
ANION GAP SERPL CALCULATED.3IONS-SCNC: 14 MMOL/L (ref 3–14)
ANION GAP SERPL CALCULATED.3IONS-SCNC: 17 MMOL/L (ref 3–14)
APPEARANCE UR: ABNORMAL
APTT PPP: 38 SEC (ref 22–37)
AST SERPL W P-5'-P-CCNC: 27 U/L (ref 0–45)
AST SERPL W P-5'-P-CCNC: 42 U/L (ref 0–45)
BACTERIA #/AREA URNS HPF: ABNORMAL /HPF
BASE DEFICIT BLDA-SCNC: 10.2 MMOL/L
BASE DEFICIT BLDA-SCNC: 4.8 MMOL/L
BASE DEFICIT BLDA-SCNC: 9.4 MMOL/L
BASOPHILS # BLD AUTO: 0.1 10E9/L (ref 0–0.2)
BASOPHILS NFR BLD AUTO: 0.3 %
BILIRUB SERPL-MCNC: 0.9 MG/DL (ref 0.2–1.3)
BILIRUB SERPL-MCNC: 1.2 MG/DL (ref 0.2–1.3)
BILIRUB UR QL STRIP: NEGATIVE
BUN SERPL-MCNC: 41 MG/DL (ref 7–30)
BUN SERPL-MCNC: 50 MG/DL (ref 7–30)
CA-I BLD-MCNC: 3.9 MG/DL (ref 4.4–5.2)
CA-I BLD-MCNC: 4.5 MG/DL (ref 4.4–5.2)
CALCIUM SERPL-MCNC: 7.3 MG/DL (ref 8.5–10.1)
CALCIUM SERPL-MCNC: 7.3 MG/DL (ref 8.5–10.1)
CAOX CRY #/AREA URNS HPF: ABNORMAL /HPF
CARBAMAZEPINE SERPL-MCNC: 14.2 MG/L (ref 4–12)
CHLORIDE SERPL-SCNC: 102 MMOL/L (ref 94–109)
CHLORIDE SERPL-SCNC: 96 MMOL/L (ref 94–109)
CK SERPL-CCNC: 883 U/L (ref 30–300)
CO2 SERPL-SCNC: 16 MMOL/L (ref 20–32)
CO2 SERPL-SCNC: 22 MMOL/L (ref 20–32)
COLOR UR AUTO: ABNORMAL
CREAT SERPL-MCNC: 2.49 MG/DL (ref 0.66–1.25)
CREAT SERPL-MCNC: 3.28 MG/DL (ref 0.66–1.25)
DIFFERENTIAL METHOD BLD: ABNORMAL
EOSINOPHIL # BLD AUTO: 0.2 10E9/L (ref 0–0.7)
EOSINOPHIL NFR BLD AUTO: 0.9 %
ERYTHROCYTE [DISTWIDTH] IN BLOOD BY AUTOMATED COUNT: 14.8 % (ref 10–15)
ERYTHROCYTE [DISTWIDTH] IN BLOOD BY AUTOMATED COUNT: 14.8 % (ref 10–15)
GFR SERPL CREATININE-BSD FRML MDRD: 20 ML/MIN/{1.73_M2}
GFR SERPL CREATININE-BSD FRML MDRD: 27 ML/MIN/{1.73_M2}
GLUCOSE BLDC GLUCOMTR-MCNC: 277 MG/DL (ref 70–99)
GLUCOSE SERPL-MCNC: 106 MG/DL (ref 70–99)
GLUCOSE SERPL-MCNC: 219 MG/DL (ref 70–99)
GLUCOSE UR STRIP-MCNC: NEGATIVE MG/DL
GRAM STN SPEC: NORMAL
GRAM STN SPEC: NORMAL
HCO3 BLD-SCNC: 15 MMOL/L (ref 21–28)
HCO3 BLD-SCNC: 16 MMOL/L (ref 21–28)
HCO3 BLD-SCNC: 21 MMOL/L (ref 21–28)
HCT VFR BLD AUTO: 36.4 % (ref 40–53)
HCT VFR BLD AUTO: 37.7 % (ref 40–53)
HGB BLD-MCNC: 11.7 G/DL (ref 13.3–17.7)
HGB BLD-MCNC: 12.6 G/DL (ref 13.3–17.7)
HGB UR QL STRIP: NEGATIVE
HYALINE CASTS #/AREA URNS LPF: 12 /LPF (ref 0–2)
IMM GRANULOCYTES # BLD: 0.1 10E9/L (ref 0–0.4)
IMM GRANULOCYTES NFR BLD: 0.4 %
INR PPP: 1.86 (ref 0.86–1.14)
INR PPP: 1.89 (ref 0.86–1.14)
INTERPRETATION ECG - MUSE: NORMAL
KETONES UR STRIP-MCNC: NEGATIVE MG/DL
LABORATORY COMMENT REPORT: NORMAL
LACTATE BLD-SCNC: 5.9 MMOL/L (ref 0.7–2)
LACTATE BLD-SCNC: 7.7 MMOL/L (ref 0.7–2)
LACTATE BLD-SCNC: 7.8 MMOL/L (ref 0.7–2)
LACTATE BLD-SCNC: 8.8 MMOL/L (ref 0.7–2)
LACTATE BLD-SCNC: 9.5 MMOL/L (ref 0.7–2)
LEUKOCYTE ESTERASE UR QL STRIP: NEGATIVE
LIPASE SERPL-CCNC: 158 U/L (ref 73–393)
LYMPHOCYTES # BLD AUTO: 7 10E9/L (ref 0.8–5.3)
LYMPHOCYTES NFR BLD AUTO: 30.4 %
MAGNESIUM SERPL-MCNC: 1.9 MG/DL (ref 1.6–2.3)
MCH RBC QN AUTO: 29.1 PG (ref 26.5–33)
MCH RBC QN AUTO: 30.1 PG (ref 26.5–33)
MCHC RBC AUTO-ENTMCNC: 32.1 G/DL (ref 31.5–36.5)
MCHC RBC AUTO-ENTMCNC: 33.4 G/DL (ref 31.5–36.5)
MCV RBC AUTO: 90 FL (ref 78–100)
MCV RBC AUTO: 91 FL (ref 78–100)
MONOCYTES # BLD AUTO: 2.2 10E9/L (ref 0–1.3)
MONOCYTES NFR BLD AUTO: 9.4 %
MRSA DNA SPEC QL NAA+PROBE: POSITIVE
MUCOUS THREADS #/AREA URNS LPF: PRESENT /LPF
NEUTROPHILS # BLD AUTO: 13.5 10E9/L (ref 1.6–8.3)
NEUTROPHILS NFR BLD AUTO: 58.6 %
NITRATE UR QL: NEGATIVE
NRBC # BLD AUTO: 0 10*3/UL
NRBC BLD AUTO-RTO: 0 /100
O2/TOTAL GAS SETTING VFR VENT: 40 %
O2/TOTAL GAS SETTING VFR VENT: 40 %
O2/TOTAL GAS SETTING VFR VENT: ABNORMAL %
OXYHGB MFR BLD: 97 % (ref 92–100)
OXYHGB MFR BLD: 97 % (ref 92–100)
PCO2 BLD: 31 MM HG (ref 35–45)
PCO2 BLD: 32 MM HG (ref 35–45)
PCO2 BLD: 38 MM HG (ref 35–45)
PH BLD: 7.3 PH (ref 7.35–7.45)
PH BLD: 7.3 PH (ref 7.35–7.45)
PH BLD: 7.34 PH (ref 7.35–7.45)
PH UR STRIP: 5.5 PH (ref 5–7)
PHOSPHATE SERPL-MCNC: 2.9 MG/DL (ref 2.5–4.5)
PLATELET # BLD AUTO: 220 10E9/L (ref 150–450)
PLATELET # BLD AUTO: 224 10E9/L (ref 150–450)
PLATELET # BLD EST: ABNORMAL 10*3/UL
PO2 BLD: 134 MM HG (ref 80–105)
PO2 BLD: 149 MM HG (ref 80–105)
PO2 BLD: 153 MM HG (ref 80–105)
POTASSIUM SERPL-SCNC: 3.5 MMOL/L (ref 3.4–5.3)
POTASSIUM SERPL-SCNC: 4.1 MMOL/L (ref 3.4–5.3)
PROCALCITONIN SERPL-MCNC: 24.59 NG/ML
PROT SERPL-MCNC: 5.7 G/DL (ref 6.8–8.8)
PROT SERPL-MCNC: 5.8 G/DL (ref 6.8–8.8)
RBC # BLD AUTO: 4.02 10E12/L (ref 4.4–5.9)
RBC # BLD AUTO: 4.18 10E12/L (ref 4.4–5.9)
RBC #/AREA URNS AUTO: 4 /HPF (ref 0–2)
RBC MORPH BLD: ABNORMAL
SARS-COV-2 RNA SPEC QL NAA+PROBE: NEGATIVE
SARS-COV-2 RNA SPEC QL NAA+PROBE: NORMAL
SMUDGE CELLS BLD QL SMEAR: PRESENT
SODIUM SERPL-SCNC: 132 MMOL/L (ref 133–144)
SODIUM SERPL-SCNC: 135 MMOL/L (ref 133–144)
SOURCE: ABNORMAL
SP GR UR STRIP: 1.02 (ref 1–1.03)
SPECIMEN SOURCE: ABNORMAL
SPECIMEN SOURCE: NORMAL
SQUAMOUS #/AREA URNS AUTO: <1 /HPF (ref 0–1)
TROPONIN I SERPL-MCNC: 0.05 UG/L (ref 0–0.04)
TROPONIN I SERPL-MCNC: 0.1 UG/L (ref 0–0.04)
TROPONIN I SERPL-MCNC: 0.13 UG/L (ref 0–0.04)
UROBILINOGEN UR STRIP-MCNC: 2 MG/DL (ref 0–2)
WBC # BLD AUTO: 23.1 10E9/L (ref 4–11)
WBC # BLD AUTO: 27.2 10E9/L (ref 4–11)
WBC #/AREA URNS AUTO: 1 /HPF (ref 0–5)

## 2020-10-25 PROCEDURE — 84484 ASSAY OF TROPONIN QUANT: CPT | Performed by: STUDENT IN AN ORGANIZED HEALTH CARE EDUCATION/TRAINING PROGRAM

## 2020-10-25 PROCEDURE — C9113 INJ PANTOPRAZOLE SODIUM, VIA: HCPCS | Performed by: STUDENT IN AN ORGANIZED HEALTH CARE EDUCATION/TRAINING PROGRAM

## 2020-10-25 PROCEDURE — 84145 PROCALCITONIN (PCT): CPT | Performed by: STUDENT IN AN ORGANIZED HEALTH CARE EDUCATION/TRAINING PROGRAM

## 2020-10-25 PROCEDURE — 94002 VENT MGMT INPAT INIT DAY: CPT

## 2020-10-25 PROCEDURE — 82803 BLOOD GASES ANY COMBINATION: CPT | Performed by: STUDENT IN AN ORGANIZED HEALTH CARE EDUCATION/TRAINING PROGRAM

## 2020-10-25 PROCEDURE — 272N000007 HC KIT ART LINE INSERTION

## 2020-10-25 PROCEDURE — 87640 STAPH A DNA AMP PROBE: CPT | Performed by: STUDENT IN AN ORGANIZED HEALTH CARE EDUCATION/TRAINING PROGRAM

## 2020-10-25 PROCEDURE — 250N000011 HC RX IP 250 OP 636

## 2020-10-25 PROCEDURE — 93005 ELECTROCARDIOGRAM TRACING: CPT

## 2020-10-25 PROCEDURE — 999N000157 HC STATISTIC RCP TIME EA 10 MIN

## 2020-10-25 PROCEDURE — 87205 SMEAR GRAM STAIN: CPT | Performed by: STUDENT IN AN ORGANIZED HEALTH CARE EDUCATION/TRAINING PROGRAM

## 2020-10-25 PROCEDURE — 96368 THER/DIAG CONCURRENT INF: CPT

## 2020-10-25 PROCEDURE — U0003 INFECTIOUS AGENT DETECTION BY NUCLEIC ACID (DNA OR RNA); SEVERE ACUTE RESPIRATORY SYNDROME CORONAVIRUS 2 (SARS-COV-2) (CORONAVIRUS DISEASE [COVID-19]), AMPLIFIED PROBE TECHNIQUE, MAKING USE OF HIGH THROUGHPUT TECHNOLOGIES AS DESCRIBED BY CMS-2020-01-R: HCPCS | Performed by: EMERGENCY MEDICINE

## 2020-10-25 PROCEDURE — 36556 INSERT NON-TUNNEL CV CATH: CPT

## 2020-10-25 PROCEDURE — 96375 TX/PRO/DX INJ NEW DRUG ADDON: CPT | Mod: 59

## 2020-10-25 PROCEDURE — 36600 WITHDRAWAL OF ARTERIAL BLOOD: CPT

## 2020-10-25 PROCEDURE — 258N000003 HC RX IP 258 OP 636: Performed by: INTERNAL MEDICINE

## 2020-10-25 PROCEDURE — 85025 COMPLETE CBC W/AUTO DIFF WBC: CPT | Performed by: EMERGENCY MEDICINE

## 2020-10-25 PROCEDURE — 80156 ASSAY CARBAMAZEPINE TOTAL: CPT | Performed by: EMERGENCY MEDICINE

## 2020-10-25 PROCEDURE — 250N000009 HC RX 250: Performed by: STUDENT IN AN ORGANIZED HEALTH CARE EDUCATION/TRAINING PROGRAM

## 2020-10-25 PROCEDURE — 92950 HEART/LUNG RESUSCITATION CPR: CPT

## 2020-10-25 PROCEDURE — 250N000011 HC RX IP 250 OP 636: Performed by: STUDENT IN AN ORGANIZED HEALTH CARE EDUCATION/TRAINING PROGRAM

## 2020-10-25 PROCEDURE — 71045 X-RAY EXAM CHEST 1 VIEW: CPT | Mod: 26 | Performed by: RADIOLOGY

## 2020-10-25 PROCEDURE — 85027 COMPLETE CBC AUTOMATED: CPT | Performed by: STUDENT IN AN ORGANIZED HEALTH CARE EDUCATION/TRAINING PROGRAM

## 2020-10-25 PROCEDURE — 93010 ELECTROCARDIOGRAM REPORT: CPT | Performed by: INTERNAL MEDICINE

## 2020-10-25 PROCEDURE — 250N000009 HC RX 250

## 2020-10-25 PROCEDURE — 99291 CRITICAL CARE FIRST HOUR: CPT

## 2020-10-25 PROCEDURE — 96365 THER/PROPH/DIAG IV INF INIT: CPT | Mod: 59

## 2020-10-25 PROCEDURE — 200N000002 HC R&B ICU UMMC

## 2020-10-25 PROCEDURE — 250N000013 HC RX MED GY IP 250 OP 250 PS 637: Performed by: EMERGENCY MEDICINE

## 2020-10-25 PROCEDURE — 87086 URINE CULTURE/COLONY COUNT: CPT | Performed by: EMERGENCY MEDICINE

## 2020-10-25 PROCEDURE — 999N000185 HC STATISTIC TRANSPORT TIME EA 15 MIN

## 2020-10-25 PROCEDURE — 250N000013 HC RX MED GY IP 250 OP 250 PS 637: Performed by: STUDENT IN AN ORGANIZED HEALTH CARE EDUCATION/TRAINING PROGRAM

## 2020-10-25 PROCEDURE — 87070 CULTURE OTHR SPECIMN AEROBIC: CPT | Performed by: STUDENT IN AN ORGANIZED HEALTH CARE EDUCATION/TRAINING PROGRAM

## 2020-10-25 PROCEDURE — 3E053XZ INTRODUCTION OF VASOPRESSOR INTO PERIPHERAL ARTERY, PERCUTANEOUS APPROACH: ICD-10-PCS | Performed by: INTERNAL MEDICINE

## 2020-10-25 PROCEDURE — 99292 CRITICAL CARE ADDL 30 MIN: CPT | Mod: 25 | Performed by: INTERNAL MEDICINE

## 2020-10-25 PROCEDURE — 5A1945Z RESPIRATORY VENTILATION, 24-96 CONSECUTIVE HOURS: ICD-10-PCS | Performed by: INTERNAL MEDICINE

## 2020-10-25 PROCEDURE — 31500 INSERT EMERGENCY AIRWAY: CPT

## 2020-10-25 PROCEDURE — 85610 PROTHROMBIN TIME: CPT | Performed by: EMERGENCY MEDICINE

## 2020-10-25 PROCEDURE — 82330 ASSAY OF CALCIUM: CPT | Performed by: STUDENT IN AN ORGANIZED HEALTH CARE EDUCATION/TRAINING PROGRAM

## 2020-10-25 PROCEDURE — 74018 RADEX ABDOMEN 1 VIEW: CPT | Mod: 26

## 2020-10-25 PROCEDURE — 82805 BLOOD GASES W/O2 SATURATION: CPT | Performed by: EMERGENCY MEDICINE

## 2020-10-25 PROCEDURE — 999N000065 XR ABDOMEN PORT 1 VW

## 2020-10-25 PROCEDURE — 87186 SC STD MICRODIL/AGAR DIL: CPT | Performed by: STUDENT IN AN ORGANIZED HEALTH CARE EDUCATION/TRAINING PROGRAM

## 2020-10-25 PROCEDURE — 250N000011 HC RX IP 250 OP 636: Performed by: INTERNAL MEDICINE

## 2020-10-25 PROCEDURE — 84100 ASSAY OF PHOSPHORUS: CPT | Performed by: STUDENT IN AN ORGANIZED HEALTH CARE EDUCATION/TRAINING PROGRAM

## 2020-10-25 PROCEDURE — 80053 COMPREHEN METABOLIC PANEL: CPT | Performed by: EMERGENCY MEDICINE

## 2020-10-25 PROCEDURE — 85730 THROMBOPLASTIN TIME PARTIAL: CPT | Performed by: STUDENT IN AN ORGANIZED HEALTH CARE EDUCATION/TRAINING PROGRAM

## 2020-10-25 PROCEDURE — 999N000065 XR CHEST PORT 1 VW

## 2020-10-25 PROCEDURE — 87040 BLOOD CULTURE FOR BACTERIA: CPT | Mod: XS | Performed by: EMERGENCY MEDICINE

## 2020-10-25 PROCEDURE — 83690 ASSAY OF LIPASE: CPT | Performed by: EMERGENCY MEDICINE

## 2020-10-25 PROCEDURE — 80053 COMPREHEN METABOLIC PANEL: CPT | Performed by: STUDENT IN AN ORGANIZED HEALTH CARE EDUCATION/TRAINING PROGRAM

## 2020-10-25 PROCEDURE — 250N000011 HC RX IP 250 OP 636: Performed by: EMERGENCY MEDICINE

## 2020-10-25 PROCEDURE — 999N000015 HC STATISTIC ARTERIAL MONITORING DAILY

## 2020-10-25 PROCEDURE — 87077 CULTURE AEROBIC IDENTIFY: CPT | Performed by: STUDENT IN AN ORGANIZED HEALTH CARE EDUCATION/TRAINING PROGRAM

## 2020-10-25 PROCEDURE — 999N000158 HC STATISTIC RCP TIME ED VENT EA 10 MIN

## 2020-10-25 PROCEDURE — C9803 HOPD COVID-19 SPEC COLLECT: HCPCS

## 2020-10-25 PROCEDURE — 74177 CT ABD & PELVIS W/CONTRAST: CPT

## 2020-10-25 PROCEDURE — 83605 ASSAY OF LACTIC ACID: CPT | Performed by: EMERGENCY MEDICINE

## 2020-10-25 PROCEDURE — 81001 URINALYSIS AUTO W/SCOPE: CPT | Performed by: EMERGENCY MEDICINE

## 2020-10-25 PROCEDURE — 85610 PROTHROMBIN TIME: CPT | Performed by: STUDENT IN AN ORGANIZED HEALTH CARE EDUCATION/TRAINING PROGRAM

## 2020-10-25 PROCEDURE — 272N000010 HC KIT CATH ARTERIAL EXT SUPPLY

## 2020-10-25 PROCEDURE — 83605 ASSAY OF LACTIC ACID: CPT | Performed by: STUDENT IN AN ORGANIZED HEALTH CARE EDUCATION/TRAINING PROGRAM

## 2020-10-25 PROCEDURE — 82550 ASSAY OF CK (CPK): CPT | Performed by: STUDENT IN AN ORGANIZED HEALTH CARE EDUCATION/TRAINING PROGRAM

## 2020-10-25 PROCEDURE — 250N000009 HC RX 250: Performed by: EMERGENCY MEDICINE

## 2020-10-25 PROCEDURE — 76942 ECHO GUIDE FOR BIOPSY: CPT

## 2020-10-25 PROCEDURE — 36620 INSERTION CATHETER ARTERY: CPT | Performed by: STUDENT IN AN ORGANIZED HEALTH CARE EDUCATION/TRAINING PROGRAM

## 2020-10-25 PROCEDURE — 82805 BLOOD GASES W/O2 SATURATION: CPT | Performed by: INTERNAL MEDICINE

## 2020-10-25 PROCEDURE — 96366 THER/PROPH/DIAG IV INF ADDON: CPT

## 2020-10-25 PROCEDURE — 258N000003 HC RX IP 258 OP 636: Performed by: STUDENT IN AN ORGANIZED HEALTH CARE EDUCATION/TRAINING PROGRAM

## 2020-10-25 PROCEDURE — 87641 MR-STAPH DNA AMP PROBE: CPT | Performed by: STUDENT IN AN ORGANIZED HEALTH CARE EDUCATION/TRAINING PROGRAM

## 2020-10-25 PROCEDURE — 71045 X-RAY EXAM CHEST 1 VIEW: CPT

## 2020-10-25 PROCEDURE — 71045 X-RAY EXAM CHEST 1 VIEW: CPT | Mod: 26

## 2020-10-25 PROCEDURE — 83735 ASSAY OF MAGNESIUM: CPT | Performed by: STUDENT IN AN ORGANIZED HEALTH CARE EDUCATION/TRAINING PROGRAM

## 2020-10-25 PROCEDURE — 258N000003 HC RX IP 258 OP 636: Performed by: EMERGENCY MEDICINE

## 2020-10-25 PROCEDURE — 999N001017 HC STATISTIC GLUCOSE BY METER IP

## 2020-10-25 PROCEDURE — 84484 ASSAY OF TROPONIN QUANT: CPT | Performed by: EMERGENCY MEDICINE

## 2020-10-25 PROCEDURE — 99292 CRITICAL CARE ADDL 30 MIN: CPT | Mod: 25

## 2020-10-25 PROCEDURE — 82140 ASSAY OF AMMONIA: CPT | Performed by: STUDENT IN AN ORGANIZED HEALTH CARE EDUCATION/TRAINING PROGRAM

## 2020-10-25 PROCEDURE — 99291 CRITICAL CARE FIRST HOUR: CPT | Mod: 25 | Performed by: INTERNAL MEDICINE

## 2020-10-25 RX ORDER — ALBUTEROL SULFATE 5 MG/ML
2.5 SOLUTION RESPIRATORY (INHALATION) EVERY 6 HOURS PRN
Status: DISCONTINUED | OUTPATIENT
Start: 2020-10-25 | End: 2020-11-04

## 2020-10-25 RX ORDER — NALOXONE HYDROCHLORIDE 0.4 MG/ML
.1-.4 INJECTION, SOLUTION INTRAMUSCULAR; INTRAVENOUS; SUBCUTANEOUS
Status: DISCONTINUED | OUTPATIENT
Start: 2020-10-25 | End: 2020-10-25

## 2020-10-25 RX ORDER — NOREPINEPHRINE BITARTRATE 0.06 MG/ML
.03-.7 INJECTION, SOLUTION INTRAVENOUS CONTINUOUS
Status: DISCONTINUED | OUTPATIENT
Start: 2020-10-25 | End: 2020-10-28

## 2020-10-25 RX ORDER — AMOXICILLIN 250 MG
2 CAPSULE ORAL 2 TIMES DAILY PRN
Status: DISCONTINUED | OUTPATIENT
Start: 2020-10-25 | End: 2020-11-06 | Stop reason: HOSPADM

## 2020-10-25 RX ORDER — LEVOTHYROXINE SODIUM 150 UG/1
150 TABLET ORAL
Status: DISCONTINUED | OUTPATIENT
Start: 2020-10-26 | End: 2020-11-06 | Stop reason: HOSPADM

## 2020-10-25 RX ORDER — NOREPINEPHRINE BITARTRATE 0.06 MG/ML
0.03-0.4 INJECTION, SOLUTION INTRAVENOUS CONTINUOUS
Status: DISCONTINUED | OUTPATIENT
Start: 2020-10-25 | End: 2020-10-25 | Stop reason: HOSPADM

## 2020-10-25 RX ORDER — PROPOFOL 10 MG/ML
5-75 INJECTION, EMULSION INTRAVENOUS CONTINUOUS
Status: DISCONTINUED | OUTPATIENT
Start: 2020-10-25 | End: 2020-10-27

## 2020-10-25 RX ORDER — POTASSIUM CHLORIDE 1.5 G/1.58G
20-40 POWDER, FOR SOLUTION ORAL
Status: DISCONTINUED | OUTPATIENT
Start: 2020-10-25 | End: 2020-10-28

## 2020-10-25 RX ORDER — PROPOFOL 10 MG/ML
INJECTION, EMULSION INTRAVENOUS
Status: COMPLETED
Start: 2020-10-25 | End: 2020-10-25

## 2020-10-25 RX ORDER — IOPAMIDOL 755 MG/ML
80 INJECTION, SOLUTION INTRAVASCULAR ONCE
Status: COMPLETED | OUTPATIENT
Start: 2020-10-25 | End: 2020-10-25

## 2020-10-25 RX ORDER — NALOXONE HYDROCHLORIDE 0.4 MG/ML
.1-.4 INJECTION, SOLUTION INTRAMUSCULAR; INTRAVENOUS; SUBCUTANEOUS
Status: DISCONTINUED | OUTPATIENT
Start: 2020-10-25 | End: 2020-11-06 | Stop reason: HOSPADM

## 2020-10-25 RX ORDER — PIPERACILLIN SODIUM, TAZOBACTAM SODIUM 3; .375 G/15ML; G/15ML
3.38 INJECTION, POWDER, LYOPHILIZED, FOR SOLUTION INTRAVENOUS ONCE
Status: COMPLETED | OUTPATIENT
Start: 2020-10-25 | End: 2020-10-25

## 2020-10-25 RX ORDER — SODIUM CHLORIDE, SODIUM LACTATE, POTASSIUM CHLORIDE, CALCIUM CHLORIDE 600; 310; 30; 20 MG/100ML; MG/100ML; MG/100ML; MG/100ML
INJECTION, SOLUTION INTRAVENOUS
Status: DISCONTINUED
Start: 2020-10-25 | End: 2020-10-26 | Stop reason: HOSPADM

## 2020-10-25 RX ORDER — CALCIUM CARBONATE 1250 MG/5ML
1250 SUSPENSION ORAL
Status: DISCONTINUED | OUTPATIENT
Start: 2020-10-25 | End: 2020-11-06 | Stop reason: HOSPADM

## 2020-10-25 RX ORDER — SODIUM BICARBONATE 650 MG/1
650 TABLET ORAL DAILY
Status: DISCONTINUED | OUTPATIENT
Start: 2020-10-25 | End: 2020-11-06 | Stop reason: HOSPADM

## 2020-10-25 RX ORDER — CALCIUM CARBONATE 1250 MG/5ML
1250 SUSPENSION ORAL 3 TIMES DAILY
COMMUNITY
End: 2022-11-29

## 2020-10-25 RX ORDER — METOCLOPRAMIDE HYDROCHLORIDE 5 MG/5ML
10 SOLUTION ORAL
COMMUNITY
End: 2023-06-09

## 2020-10-25 RX ORDER — ACETYLCYSTEINE 200 MG/ML
2 SOLUTION ORAL; RESPIRATORY (INHALATION) EVERY 6 HOURS PRN
Status: DISCONTINUED | OUTPATIENT
Start: 2020-10-25 | End: 2020-11-04

## 2020-10-25 RX ORDER — ACETAMINOPHEN 650 MG/1
650 SUPPOSITORY RECTAL ONCE
Status: COMPLETED | OUTPATIENT
Start: 2020-10-25 | End: 2020-10-25

## 2020-10-25 RX ORDER — PIPERACILLIN SODIUM, TAZOBACTAM SODIUM 2; .25 G/10ML; G/10ML
2.25 INJECTION, POWDER, LYOPHILIZED, FOR SOLUTION INTRAVENOUS EVERY 6 HOURS
Status: DISCONTINUED | OUTPATIENT
Start: 2020-10-25 | End: 2020-10-25

## 2020-10-25 RX ORDER — PROPOFOL 10 MG/ML
10-20 INJECTION, EMULSION INTRAVENOUS EVERY 30 MIN PRN
Status: DISCONTINUED | OUTPATIENT
Start: 2020-10-25 | End: 2020-10-27

## 2020-10-25 RX ORDER — NOREPINEPHRINE BITARTRATE 0.06 MG/ML
INJECTION, SOLUTION INTRAVENOUS
Status: COMPLETED
Start: 2020-10-25 | End: 2020-10-25

## 2020-10-25 RX ORDER — ALBUTEROL SULFATE 5 MG/ML
2.5 SOLUTION RESPIRATORY (INHALATION)
Status: DISCONTINUED | OUTPATIENT
Start: 2020-10-25 | End: 2020-10-25

## 2020-10-25 RX ORDER — MAGNESIUM SULFATE HEPTAHYDRATE 40 MG/ML
4 INJECTION, SOLUTION INTRAVENOUS EVERY 4 HOURS PRN
Status: DISCONTINUED | OUTPATIENT
Start: 2020-10-25 | End: 2020-10-28

## 2020-10-25 RX ORDER — EPINEPHRINE IN SOD CHLOR,ISO 1 MG/10 ML
1 SYRINGE (ML) INTRAVENOUS ONCE
Status: COMPLETED | OUTPATIENT
Start: 2020-10-25 | End: 2020-10-25

## 2020-10-25 RX ORDER — PROCHLORPERAZINE 25 MG
25 SUPPOSITORY, RECTAL RECTAL EVERY 12 HOURS PRN
Status: DISCONTINUED | OUTPATIENT
Start: 2020-10-25 | End: 2020-11-06 | Stop reason: HOSPADM

## 2020-10-25 RX ORDER — MAGNESIUM SULFATE HEPTAHYDRATE 40 MG/ML
2 INJECTION, SOLUTION INTRAVENOUS DAILY PRN
Status: DISCONTINUED | OUTPATIENT
Start: 2020-10-25 | End: 2020-10-28

## 2020-10-25 RX ORDER — FLUDROCORTISONE ACETATE 0.1 MG/1
100 TABLET ORAL DAILY
Status: DISCONTINUED | OUTPATIENT
Start: 2020-10-25 | End: 2020-10-28

## 2020-10-25 RX ORDER — FENTANYL CITRATE-0.9 % NACL/PF 10 MCG/ML
100 PLASTIC BAG, INJECTION (ML) INTRAVENOUS ONCE
Status: COMPLETED | OUTPATIENT
Start: 2020-10-25 | End: 2020-10-25

## 2020-10-25 RX ORDER — HEPARIN SODIUM 5000 [USP'U]/.5ML
5000 INJECTION, SOLUTION INTRAVENOUS; SUBCUTANEOUS EVERY 8 HOURS
Status: DISCONTINUED | OUTPATIENT
Start: 2020-10-25 | End: 2020-10-27

## 2020-10-25 RX ORDER — POTASSIUM CHLORIDE 29.8 MG/ML
20 INJECTION INTRAVENOUS
Status: DISCONTINUED | OUTPATIENT
Start: 2020-10-25 | End: 2020-10-28

## 2020-10-25 RX ORDER — LORAZEPAM 2 MG/ML
2 INJECTION INTRAMUSCULAR ONCE
Status: COMPLETED | OUTPATIENT
Start: 2020-10-25 | End: 2020-10-25

## 2020-10-25 RX ORDER — POLYETHYLENE GLYCOL 3350 17 G/17G
17 POWDER, FOR SOLUTION ORAL DAILY PRN
Status: DISCONTINUED | OUTPATIENT
Start: 2020-10-25 | End: 2020-11-06 | Stop reason: HOSPADM

## 2020-10-25 RX ORDER — AMOXICILLIN 250 MG
1 CAPSULE ORAL 2 TIMES DAILY PRN
Status: DISCONTINUED | OUTPATIENT
Start: 2020-10-25 | End: 2020-11-06 | Stop reason: HOSPADM

## 2020-10-25 RX ORDER — POTASSIUM CHLORIDE 750 MG/1
20-40 TABLET, EXTENDED RELEASE ORAL
Status: DISCONTINUED | OUTPATIENT
Start: 2020-10-25 | End: 2020-10-28

## 2020-10-25 RX ORDER — VITAMIN B COMPLEX
50 TABLET ORAL DAILY
Status: DISCONTINUED | OUTPATIENT
Start: 2020-10-26 | End: 2020-11-06 | Stop reason: HOSPADM

## 2020-10-25 RX ORDER — CARBAMAZEPINE 100 MG/5ML
150 SUSPENSION ORAL EVERY 6 HOURS SCHEDULED
Status: DISCONTINUED | OUTPATIENT
Start: 2020-10-25 | End: 2020-11-06 | Stop reason: HOSPADM

## 2020-10-25 RX ORDER — POTASSIUM CHLORIDE 7.45 MG/ML
10 INJECTION INTRAVENOUS
Status: DISCONTINUED | OUTPATIENT
Start: 2020-10-25 | End: 2020-10-28

## 2020-10-25 RX ORDER — BISACODYL 10 MG
10 SUPPOSITORY, RECTAL RECTAL DAILY PRN
Status: DISCONTINUED | OUTPATIENT
Start: 2020-10-25 | End: 2020-11-06 | Stop reason: HOSPADM

## 2020-10-25 RX ORDER — PIPERACILLIN SODIUM, TAZOBACTAM SODIUM 3; .375 G/15ML; G/15ML
3.38 INJECTION, POWDER, LYOPHILIZED, FOR SOLUTION INTRAVENOUS EVERY 6 HOURS
Status: DISCONTINUED | OUTPATIENT
Start: 2020-10-25 | End: 2020-10-29

## 2020-10-25 RX ORDER — ACETYLCYSTEINE 200 MG/ML
2 SOLUTION ORAL; RESPIRATORY (INHALATION) EVERY 6 HOURS
Status: DISCONTINUED | OUTPATIENT
Start: 2020-10-25 | End: 2020-10-25

## 2020-10-25 RX ORDER — POTASSIUM CL/LIDO/0.9 % NACL 10MEQ/0.1L
10 INTRAVENOUS SOLUTION, PIGGYBACK (ML) INTRAVENOUS
Status: DISCONTINUED | OUTPATIENT
Start: 2020-10-25 | End: 2020-10-28

## 2020-10-25 RX ORDER — METOCLOPRAMIDE HYDROCHLORIDE 5 MG/5ML
10 SOLUTION ORAL
Status: DISCONTINUED | OUTPATIENT
Start: 2020-10-25 | End: 2020-11-06 | Stop reason: HOSPADM

## 2020-10-25 RX ORDER — GINSENG 100 MG
CAPSULE ORAL 2 TIMES DAILY PRN
Status: DISCONTINUED | OUTPATIENT
Start: 2020-10-25 | End: 2020-11-06 | Stop reason: HOSPADM

## 2020-10-25 RX ADMIN — SODIUM CHLORIDE, POTASSIUM CHLORIDE, SODIUM LACTATE AND CALCIUM CHLORIDE 1000 ML: 600; 310; 30; 20 INJECTION, SOLUTION INTRAVENOUS at 15:20

## 2020-10-25 RX ADMIN — HEPARIN SODIUM 5000 UNITS: 10000 INJECTION, SOLUTION INTRAVENOUS; SUBCUTANEOUS at 20:23

## 2020-10-25 RX ADMIN — LORAZEPAM 2 MG: 2 INJECTION INTRAMUSCULAR; INTRAVENOUS at 10:05

## 2020-10-25 RX ADMIN — Medication 0.5 MCG/KG/MIN: at 15:31

## 2020-10-25 RX ADMIN — EPINEPHRINE 0.14 MCG/KG/MIN: 1 INJECTION PARENTERAL at 15:32

## 2020-10-25 RX ADMIN — Medication 0.35 MCG/KG/MIN: at 23:18

## 2020-10-25 RX ADMIN — SODIUM CHLORIDE, POTASSIUM CHLORIDE, SODIUM LACTATE AND CALCIUM CHLORIDE 1000 ML: 600; 310; 30; 20 INJECTION, SOLUTION INTRAVENOUS at 18:21

## 2020-10-25 RX ADMIN — BRIVARACETAM 100 MG: 10 SOLUTION ORAL at 21:00

## 2020-10-25 RX ADMIN — EPINEPHRINE 1 MG: 0.1 INJECTION INTRACARDIAC; INTRAVENOUS at 08:14

## 2020-10-25 RX ADMIN — Medication 0.1 MCG/KG/MIN: at 07:13

## 2020-10-25 RX ADMIN — Medication 4 UNITS/HR: at 19:08

## 2020-10-25 RX ADMIN — NOREPINEPHRINE BITARTRATE 0.1 MCG/KG/MIN: 0.06 INJECTION, SOLUTION INTRAVENOUS at 07:13

## 2020-10-25 RX ADMIN — Medication 100 MCG: at 07:49

## 2020-10-25 RX ADMIN — SODIUM CHLORIDE, POTASSIUM CHLORIDE, SODIUM LACTATE AND CALCIUM CHLORIDE 1000 ML: 600; 310; 30; 20 INJECTION, SOLUTION INTRAVENOUS at 13:14

## 2020-10-25 RX ADMIN — HYDROCORTISONE SODIUM SUCCINATE 50 MG: 100 INJECTION, POWDER, FOR SOLUTION INTRAMUSCULAR; INTRAVENOUS at 15:21

## 2020-10-25 RX ADMIN — HEPARIN SODIUM 5000 UNITS: 10000 INJECTION, SOLUTION INTRAVENOUS; SUBCUTANEOUS at 14:19

## 2020-10-25 RX ADMIN — MIDAZOLAM HYDROCHLORIDE 2 MG: 1 INJECTION, SOLUTION INTRAMUSCULAR; INTRAVENOUS at 08:56

## 2020-10-25 RX ADMIN — CARBAMAZEPINE 150 MG: 100 SUSPENSION ORAL at 23:38

## 2020-10-25 RX ADMIN — PROPOFOL: 10 INJECTION, EMULSION INTRAVENOUS at 06:55

## 2020-10-25 RX ADMIN — MAGNESIUM SULFATE IN WATER 2 G: 40 INJECTION, SOLUTION INTRAVENOUS at 17:35

## 2020-10-25 RX ADMIN — PANTOPRAZOLE SODIUM 40 MG: 40 INJECTION, POWDER, FOR SOLUTION INTRAVENOUS at 14:20

## 2020-10-25 RX ADMIN — SODIUM CHLORIDE 88 ML: 9 INJECTION, SOLUTION INTRAVENOUS at 08:11

## 2020-10-25 RX ADMIN — FLUDROCORTISONE ACETATE 100 MCG: 0.1 TABLET ORAL at 21:01

## 2020-10-25 RX ADMIN — CALCIUM CARBONATE 1250 MG: 1250 SUSPENSION ORAL at 14:09

## 2020-10-25 RX ADMIN — SODIUM CHLORIDE 3000 ML: 9 INJECTION, SOLUTION INTRAVENOUS at 09:06

## 2020-10-25 RX ADMIN — SODIUM BICARBONATE 650 MG TABLET 650 MG: at 14:34

## 2020-10-25 RX ADMIN — PIPERACILLIN SODIUM AND TAZOBACTAM SODIUM 3.38 G: 3; .375 INJECTION, POWDER, LYOPHILIZED, FOR SOLUTION INTRAVENOUS at 08:01

## 2020-10-25 RX ADMIN — VANCOMYCIN HYDROCHLORIDE 1500 MG: 5 INJECTION, POWDER, LYOPHILIZED, FOR SOLUTION INTRAVENOUS at 08:34

## 2020-10-25 RX ADMIN — PIPERACILLIN AND TAZOBACTAM 3.38 G: 3; .375 INJECTION, POWDER, FOR SOLUTION INTRAVENOUS at 14:21

## 2020-10-25 RX ADMIN — EPINEPHRINE 0.14 MCG/KG/MIN: 1 INJECTION PARENTERAL at 18:02

## 2020-10-25 RX ADMIN — HYDROCORTISONE SODIUM SUCCINATE 100 MG: 100 INJECTION, POWDER, FOR SOLUTION INTRAMUSCULAR; INTRAVENOUS at 08:17

## 2020-10-25 RX ADMIN — EPINEPHRINE 0.03 MCG/KG/MIN: 1 INJECTION INTRAMUSCULAR; INTRAVENOUS; SUBCUTANEOUS at 08:36

## 2020-10-25 RX ADMIN — CALCIUM GLUCONATE 1 G: 98 INJECTION, SOLUTION INTRAVENOUS at 18:26

## 2020-10-25 RX ADMIN — IOPAMIDOL 80 ML: 755 INJECTION, SOLUTION INTRAVENOUS at 08:11

## 2020-10-25 RX ADMIN — CARBAMAZEPINE 150 MG: 100 SUSPENSION ORAL at 18:04

## 2020-10-25 RX ADMIN — HYDROCORTISONE SODIUM SUCCINATE 60 MG: 100 INJECTION, POWDER, FOR SOLUTION INTRAMUSCULAR; INTRAVENOUS at 20:55

## 2020-10-25 RX ADMIN — CALCIUM CARBONATE 1250 MG: 1250 SUSPENSION ORAL at 18:04

## 2020-10-25 RX ADMIN — CALCIUM GLUCONATE 1 G: 98 INJECTION, SOLUTION INTRAVENOUS at 16:36

## 2020-10-25 RX ADMIN — PIPERACILLIN AND TAZOBACTAM 3.38 G: 3; .375 INJECTION, POWDER, FOR SOLUTION INTRAVENOUS at 20:18

## 2020-10-25 RX ADMIN — MICONAZOLE NITRATE: 20 POWDER TOPICAL at 21:01

## 2020-10-25 RX ADMIN — BRIVARACETAM 100 MG: 10 SOLUTION ORAL at 10:37

## 2020-10-25 RX ADMIN — PROPOFOL 15 MCG/KG/MIN: 10 INJECTION, EMULSION INTRAVENOUS at 19:53

## 2020-10-25 RX ADMIN — ACETAMINOPHEN 650 MG: 650 SUPPOSITORY RECTAL at 08:45

## 2020-10-25 RX ADMIN — Medication 25 MCG/HR: at 17:44

## 2020-10-25 RX ADMIN — VASOPRESSIN 2.4 UNITS/HR: 20 INJECTION INTRAVENOUS at 09:17

## 2020-10-25 ASSESSMENT — ACTIVITIES OF DAILY LIVING (ADL)
ADLS_ACUITY_SCORE: 44
ADLS_ACUITY_SCORE: 40

## 2020-10-25 NOTE — ED PROVIDER NOTES
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  The patient's mother told EMS and me that during the middle of the night the patient's breathing worsened and this morning, the patient had a fever of 102.7 axillary, prompting her EMS call. Per EMS, the patient has had respiratory distress since midnight, breathing at 30 breaths per minute. EMS states the patient had a fever of 102.7 at home and SPO2 in the high 70s on room air. The patient's mother says yesterday she thought the patient's breathing was abnormal, however the patient's temperature was normal.    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  Marital Status:  Single [1]     Review of Systems   Unable to perform ROS: Intubated         Physical Exam     Patient Vitals for the past 24 hrs:   BP Temp Pulse Resp SpO2 Weight   10/25/20 1118 105/71 101.7  F (38.7  C) 126 --  100 % --   10/25/20 1116 -- -- 128 -- -- --   10/25/20 1115 94/69 101.7  F (38.7  C) 125 -- 100 % --   10/25/20 1112 100/68 -- 125 19 100 % --   10/25/20 1109 96/69 101.7  F (38.7  C) 125 -- -- --   10/25/20 1106 107/69 101.5  F (38.6  C) 126 -- 100 % --   10/25/20 1103 105/64 101.5  F (38.6  C) 126 -- 99 % --   10/25/20 1100 94/68 101.5  F (38.6  C) 125 -- 99 % --   10/25/20 1036 (!) 87/64 101.5  F (38.6  C) 125 18 100 % --   10/25/20 1030 91/62 101.5  F (38.6  C) 126 -- 99 % --   10/25/20 1021 103/63 101.3  F (38.5  C) 128 -- 100 % --   10/25/20 1015 100/58 101.3  F (38.5  C) 125 11 100 % --   10/25/20 1003 100/61 101.3  F (38.5  C) 124 16 100 % --   10/25/20 0951 97/62 101.3  F (38.5  C) 126 (!) 7 100 % --   10/25/20 0948 100/62 101.3  F (38.5  C) 126 10 100 % --   10/25/20 0936 92/60 -- 126 10 100 % --   10/25/20 0933 92/56 -- 126 -- -- --   10/25/20 0927 (!) 85/55 101.3  F (38.5  C) 125 -- 100 % --   10/25/20 0924 (!) 84/49 101.3  F (38.5  C) 128 10 100 % --   10/25/20 0921 92/53 -- -- -- -- --   10/25/20 0920 92/53 101.3  F (38.5  C) 129 23 100 % --   10/25/20 0915 (!) 73/47 100.9  F (38.3  C) 128 15 100 % --   10/25/20 0912 (!) 79/51 100.6  F (38.1  C) 130 23 99 % --   10/25/20 0910 (!) 76/47 99.9  F (37.7  C) 131 (!) 0 100 % --   10/25/20 0905 (!) 74/51 -- 133 21 99 % --   10/25/20 0900 (!) 82/40 -- 138 21 99 % --   10/25/20 0855 (!) 69/49 -- 135 11 99 % --   10/25/20 0850 (!) 70/55 -- 134 12 100 % --   10/25/20 0845 (!) 76/48 -- 134 24 97 % --   10/25/20 0840 (!) 72/52 -- 137 14 98 % --   10/25/20 0830 (!) 83/49 -- 135 -- -- --   10/25/20 0828 (!) 88/57 -- 137 (!) 42 -- --   10/25/20 0826 (!) 77/54 -- 136 -- -- --   10/25/20 0817 134/82 -- -- -- -- --   10/25/20 0809 (!) 40/38 -- -- -- -- --   10/25/20 0805 (!) 59/39 -- 131 18 100 % --   10/25/20 0800 (!) 66/42 -- 134 (!) 36 99 % --   10/25/20 0755 (!) 56/39 -- 134 26 99 % --   10/25/20 0750 (!) 69/36 -- 138 (!) 34 99 % --   10/25/20 0745 (!) 49/26 -- 134  26 99 % --   10/25/20 0725 (!) 58/36 -- 144 30 (!) 36 % --   10/25/20 0720 (!) 66/35 -- 154 11 (!) 80 % --   10/25/20 0716 (!) 68/50 -- 165 22 (!) 83 % --   10/25/20 0715 (!) 167/87 -- 172 (!) 36 (!) 74 % --   10/25/20 0710 (!) 89/49 -- 121 25 91 % --   10/25/20 0705 (!) 72/26 -- 134 28 100 % --   10/25/20 0700 (!) 59/28 -- 120 22 100 % --   10/25/20 0655 (!) 59/27 -- 126 30 95 % --   10/25/20 0650 (!) 53/30 -- 134 11 (!) 86 % --   10/25/20 0645 90/63 -- 130 10 (!) 83 % --   10/25/20 0643 -- -- -- -- -- 68.9 kg (151 lb 12.8 oz)   10/25/20 0635 -- -- 128 (!) 34 98 % --   10/25/20 0630 -- -- 142 (!) 35 (!) 88 % --   10/25/20 0629 (!) 141/26 -- 142 -- (!) 77 % --       Physical Exam  Nursing note and vitals reviewed.  Constitutional:  Unresponsive and in severe respiratory distress.   HENT:   Mouth/Throat:   Mucous membranes are dry.   Eyes:    Conjunctivae normal and EOM appear normal.   Cardiovascular:  Tachycardic rate, regular rhythm, normal heart sounds and diminished pulses throughout. No murmur heard.  Pulmonary/Chest:  Severe respiratory distress present with tachypnea and coarse breath sounds throughout.   Abdominal:   Soft.  G-tube in place but otherwise normal appearance and bowel sounds are normal.      There is no apparent tenderness.   Musculoskeletal:  Extremities atraumatic x 4.   Neurological:   Unresponsive and not moving any extremities.  Skin:    Skin is intact. No rash noted.   Psychiatric:   Unresponsive.      Emergency Department Course   ECG (11:10:24):  Rate 126 bpm. ND interval 12. QRS duration 88. QT/QTc 350/506. P-R-T axes * -61 40. Sinus tachycardia. Left anterior fascicular block. Abnormal ECG. Interpreted at 1119 by Fam Farnsworth MD.    Imaging:  Radiographic findings were communicated with the patient and family who voiced understanding of the findings.    XR Chest Port 1 View  IMPRESSION: Endotracheal tube tip almost 10 cm above the marcella,  consider repositioning. Increased right  base infiltrate. Left lung  clear.     BERT ESPINOZA MD    CT Chest PE Abdomen Pelvis w Contrast  IMPRESSION:   1. Consolidation in both lower lobes right worse than left, concerning for pneumonia.  2. No pulmonary embolism demonstrated.  3. No acute process demonstrated in the abdomen and pelvis.     BERT ESPINOZA MD    Laboratory:  0742 Troponin I 0.095 (H)  0742 Lactic acid 7.7 (H)  1009 Lactic acid 5.9 (H)  CBC: WBC 23.1 (H), HGB 12.6 (L), o/w WNL ()  CMP: Sodium 132 (L), glucose 106 (H), urea 50 (H), creatinine 3.28 (H), GFR 20 (L), calcium 7.3 (L), albumin 2.1 (L), protein 5.7 (L), o/w WNL  Lipase: 158  INR: 1.89 (H)  Carbamazepine total: 14.2 (H)  Blood gas arterial and oxyhbg: pH 7.34 (L), PO2 153 (H), o/w WNL  Blood culture x2: Pending    UA: Protein 10, RBC 4 (H), Bacteria few, Mucous present, Hyaline casts 12 (H), Calcium few, o/w neg.  Urine culture: Pending    Symptomatic COVID-19 Virus by PCR: Pending    [facility]    -Intubation    Date/Time: 10/25/2020 6:45 AM  Performed by: Fam Farnsworth MD  Authorized by: Fam Farnsworth MD     ED EVALUATION:      I have performed an Emergency Department Evaluation including taking a history and physical examination, this evaluation will be documented in the electronic medical record for this ED encounter.      ASA Class: Class 4- Severe systemic disease, acute unstable problems    Mallampati: Grade 1- soft palate, uvula, tonsillar pillars, and posterior pharyngeal wall visible  UNIVERSAL PROTOCOL   Site Marked: NA  Prior Images Obtained and Reviewed:  NA  Required items: Required blood products, implants, devices and special equipment available    Patient identity confirmed:  Provided demographic data  Patient was reevaluated immediately before administering moderate or deep sedation or anesthesia  Confirmation Checklist:  Patient's identity using two indicators  Time out: Immediately prior to the procedure a time out was  called    Universal Protocol: the Joint Commission Universal Protocol was followed    Preparation: Patient was prepped and draped in usual sterile fashion          PRE-PROCEDURE DETAILS     Patient status:  Unresponsive    Pretreatment medications:  None    Paralytics:  Succinylcholine        SEDATION    Patient Sedated: Yes    Sedation:  Etomidate  Vital signs: Vital signs monitored during sedation    PROCEDURE DETAILS     Preoxygenation:  Bag valve mask    CPR in progress: no      Intubation method:  Oral    Oral intubation technique:  Direct    Laryngoscope blade:  Mac 4    Tube size (mm):  7.5    Tube type:  Cuffed    Number of attempts:  1    Cricoid pressure: yes      Tube visualized through cords: yes      PLACEMENT ASSESSMENT     ETT to lip:  22    Tube secured with:  ETT lundberg    Breath sounds:  Equal and absent over the epigastrium    Placement verification: chest rise, condensation, CXR verification, direct visualization, equal breath sounds, ETCO2 detector and tube exhalation      CXR findings:  ETT in proper place  PROCEDURE   Patient Tolerance:  Patient tolerated the procedure well with no immediate complications    Length of time physician/provider present for 1:1 monitoring during sedation: [address] [place] [address]    -[facility]    Date/Time: 10/25/2020 6:58 AM  Performed by: Fam Farnsworth MD  Authorized by: Fam Farnsworth MD     ED EVALUATION:      I have performed an Emergency Department Evaluation including taking a history and physical examination, this evaluation will be documented in the electronic medical record for this ED encounter.      ASA Class: Class 4- Severe systemic disease, acute unstable problems    Mallampati: Grade 1- soft palate, uvula, tonsillar pillars, and posterior pharyngeal wall visible  UNIVERSAL PROTOCOL   Site Marked: NA  Prior Images Obtained and Reviewed:  NA  Required items: Required blood products, implants, devices and special equipment  available    Patient identity confirmed:  Provided demographic data  Patient was reevaluated immediately before administering moderate or deep sedation or anesthesia  Confirmation Checklist:  Patient's identity using two indicators  Time out: Immediately prior to the procedure a time out was called    Universal Protocol: the Joint Commission Universal Protocol was followed    Preparation: Patient was prepped and draped in usual sterile fashion           ANESTHESIA    Anesthesia: Local infiltration  Local Anesthetic:  Lidocaine 1% without epinephrine      SEDATION    Patient Sedated: Yes    Sedation:  Lorazepam and propofol  Vital signs: Vital signs monitored during sedation      PRE-PROCEDURE DETAILS:     Hand hygiene: Hand hygiene performed prior to insertion      Sterile barrier technique: All elements of maximal sterile technique followed      Skin preparation:  ChloraPrep    Skin preparation agent: Skin preparation agent completely dried prior to procedure      PROCEDURE DETAILS:     Location:  R femoral    Patient position:  Trendelenburg    Procedural supplies:  Triple lumen    Landmarks identified: yes      Ultrasound guidance: no      Number of attempts:  3    Successful placement: yes      POST PROCEDURE DETAILS:      Post-procedure:  Dressing applied and line sutured    Assessment:  Blood return through all ports and free fluid flow  PROCEDURE   Patient Tolerance:  Patient tolerated the procedure well with no immediate complications    Length of time physician/provider present for 1:1 monitoring during sedation: 15    Right External Jugular Vein Line Procedure:  18 gauge IV was placed in the right external jugular vein.         Interventions:  0655 Propofol 1000mg/100mL IV  0713 Levophed 16 mg 0.1 mcg/kg/min IV  0720 Levophed 16 mg 0.13 mcg/kg/min IV  0748 Levophed 16 mg 0.23 mcg/kg/min IV  0756 Levophed 16 mg 0.28 mcg/kg/min IV  0801 Zosyn 3.375 g IV  0803 Levophed 16 mg 0.28 mcg/kg/min IV  0809 Levophed  16 mg 0.28 mcg/kg/min IV  0814 Adrenalin 1 mg IV  0817 solu-cortef 100 mg IV  0834 Vancomycin 1500 mg IV  0836 Adrenalin 5 mg 0.03 mcg/kg/min  0845 Tylenol 650 mg Rectal  0851 Adrenalin 5 mg 0.06 mcg/kg/min IV  0856 Versed 2 mg IV  0906 NS 3L IV Bolus  0908 Adrenalin 5 mg 0.11 mcg/kg/min IV  0916 Adrenalin 5 mg 0.16 mcg/kg/min IV  0917 Vasopressin 40 units 2.4 units/hr IV  1005 Ativan 2 mg IV  1037 Briviact 100 mg PO    Emergency Department Course:  The patient arrived in the emergency department via EMS.    Past medical records, nursing notes, and vitals reviewed.  0626: I performed an exam of the patient and obtained history, as documented above.    EKG obtained in the ED, see results above.     IV was inserted and blood was drawn for laboratory testing, results above.    The patient provided a urine sample here in the emergency department. This was sent for laboratory testing, findings above.    The patient was sent for a chest x-ray and chest PE abdomen pelvis CT while in the emergency department, findings above.     0639: Patient intubated, procedure noted above.    0700: Peripheral IV placed in right external jugular vein by Dr. Farnsworth.    0710: CPR performed. 10 seconds.    0711: CPR performed. 5 seconds.    0712: Central line placed, procedure noted above.    0720: Arterial line attempted.    0747: Chest x-ray performed.    0803: Discussed patient with Dr. Akers. Plan is to transfer the patient to the [facility].    0854: I rechecked the patient.    0904: I rechecked the patient.    0925: I rechecked the patient.    Findings and plan explained to the mother.    1005: Patient will be transferred to [facility] via EMS. Discussed the case with Dr. Akers, who will admit the patient to a monitored bed for further monitoring, evaluation, and treatment.     Impression & Plan      CMS Diagnoses:   The patient has signs of Septic Shock    The patient has signs of septic shock as evidenced by:  1. Presence of Sepsis,  AND  2. Lactic Acidosis with value greater than or equal to 4 and Persistent hypotension defined by the last 2 BP readings within the ONE HOUR following completion of the 30mL/kg bolus being low (SBP <90 or MAP <65)    Time septic shock diagnosis confirmed = 0630  10/25/20   as this was the time when Provider determined that septic shock was present    3 Hour Septic Shock Bundle Completion:  1. Initial Lactic Acid Result:   Recent Labs   Lab Test 10/25/20  1009 10/25/20  0742 09/25/20  0003   LACT 5.9* 7.7* 2.0     2. Blood Cultures before Antibiotics: Yes  3. Broad Spectrum Antibiotics Administered:  yes       Anti-infectives (From admission through now)    Start     Dose/Rate Route Frequency Ordered Stop    10/25/20 0650  vancomycin 1500 mg in 0.9% NaCl 250 ml intermittent infusion 1,500 mg      1,500 mg  over 90 Minutes Intravenous ONCE 10/25/20 0647 10/25/20 1004    10/25/20 0645  piperacillin-tazobactam (ZOSYN) 3.375 g vial to attach to  mL bag      3.375 g  over 30 Minutes Intravenous ONCE 10/25/20 0642 10/25/20 0831          4. IF patient is in septic shock within 6 hours of time zero, as defined by:  -Initial Hypotension:  2 low BP readings (SBP <90, MAP <65, or decrease > 40 from baseline due to infection) within 3 hrs of each other during the time period of 6hrs before and 6 hrs  after time zero  -Lactate > or = 4    BMI Readings from Last 1 Encounters:   10/25/20 20.59 kg/m      30 mL/kg fluids based on weight: 2,070 mL  30 mL/kg fluids based on IBW (must be >= 60 inches tall): 2,330 mL    Septic Shock reassessment:  1. Repeat Lactic Acid Level: 5.9  2. Vasopressors started for Persistent Hypotension defined by the last 2 BP readings within the ONE HOUR following completion of the 30mL/kg bolus being low (SBP <90 or MAP <65).    I attest to having performed a repeat sepsis exam and assessment of perfusion at 1030 and the results demonstrate improved perfusion.    Medical Decision Making:  This is a  60-year-old male who was brought in by EMS and has a history of prior significant TBI who came in respiratory distress.  Based on his history, I am suspicious he would be septic with aspiration pneumonia.  I intubated him immediately due to his severe respiratory distress per the above procedure note.  I also placed my own peripheral IV in his right external jugular vein.  Due to his severe hypotension, I then proceeded to place a central line.  I initially attempted in the right IJ region using ultrasound guidance, however his IJ was quite small and deep on the ultrasound and it was unsuccessful.  I then went to his right groin region and after a couple of attempts, including a femoral artery cannulation, I was able to place a triple-lumen central line in the right femoral vein.  During this time we did briefly lose his pulse as his pressure dropped to about 40 systolic.  We briefly performed chest compressions and he was provided a dose of IV epinephrine, as well as a few peripheral IV pushes of Sin-Synephrine for a total of 700 mcg.  He also had Levophed started peripherally while I was placing the central line.  His chest x-ray did not seem very impressive given his respiratory distress, and therefore I felt it was appropriate and necessary to proceed with a CT scan of his chest to rule out PE and to look further for pneumonia.  I also felt it was reasonable and appropriate to also obtain a CT scan of his abdomen and pelvis due to his lack of ability to provide history.  Unfortunately, his labs came back showing acute renal failure after he obtained his CT scan.  He also was provided IV Zosyn and vancomycin for his severe sepsis and what is likely secondary to aspiration pneumonia.  He was also provided a stress steroid dose of 100 mg of IV hydrocortisone.  His blood pressure remained quite low, and therefore we had to maxed out on his Levophed, add epinephrine, and add vasopressin.  We were able to get his  blood pressure over 100 systolic, but this was on all 3 pressors at high doses.  Unfortunately, we do not have any ICU beds here at [facility], but we were able to find an ICU bed at the .  I spoke with the ICU physician there, Dr. Akers, on a couple of occasions as well, who will be admitting the patient.  We then transferred him there via ambulance.    Critical Care time:  was 90 minutes for this patient excluding procedures.    Covid-19  Keyon Farias was evaluated during a global COVID-19 pandemic, which necessitated consideration that the patient might be at risk for infection with the SARS-CoV-2 virus that causes COVID-19.   Applicable protocols for evaluation were followed during the patient's care.   COVID-19 was considered as part of the patient's evaluation. The plan for testing is:  a test was obtained during this visit.    Diagnosis:    ICD-10-CM    1. Acute respiratory failure with hypoxia (H)  J96.01 Urine Culture     Blood culture     Blood culture     Symptomatic COVID-19 Virus (Coronavirus) by PCR     Carbamazepine total     Carbamazepine total     Lactic acid whole blood     Blood gas arterial and oxyhgb     SARS-CoV-2 COVID-19 Virus (Coronavirus) RT-PCR     SARS-CoV-2 COVID-19 Virus (Coronavirus) RT-PCR     CANCELED: Carbamazepine total   2. Septic shock (H)  A41.9     R65.21    3. Aspiration pneumonia of both lower lobes, unspecified aspiration pneumonia type (H)  J69.0    4. Acute renal failure, unspecified acute renal failure type (H)  N17.9        Disposition:  Transferred to [facility].    Scribe Disclosure:  I, Kaity Arango, am serving as a scribe at 6:26 AM on 10/25/2020 to document services personally performed by Fam Farnsworth MD based on my observations and the provider's statements to me.    [facility] EMERGENCY DEPT       Fam Farnsworth MD  10/25/20 2484

## 2020-10-25 NOTE — PHARMACY-VANCOMYCIN DOSING SERVICE
Pharmacy Vancomycin Initial Note  Date of Service 2020  Patient's  1960  60 year old, male    Indication: Aspiration Pneumonia    Current estimated CrCl = Estimated Creatinine Clearance: 23.9 mL/min (A) (based on SCr of 3.28 mg/dL (H)).    Creatinine for last 3 days  10/25/2020:  7:42 AM Creatinine 3.28 mg/dL    Recent Vancomycin Level(s) for last 3 days  No results found for requested labs within last 72 hours.      Vancomycin IV Administrations (past 72 hours)                   vancomycin 1500 mg in 0.9% NaCl 250 ml intermittent infusion 1,500 mg (mg) 1,500 mg Given 10/25/20 0834                Nephrotoxins and other renal medications (From now, onward)    Start     Dose/Rate Route Frequency Ordered Stop    10/25/20 1400  piperacillin-tazobactam (ZOSYN) 3.375 g vial to attach to  mL bag      3.375 g  over 30 Minutes Intravenous EVERY 6 HOURS 10/25/20 1256      10/25/20 1301  vancomycin place lundberg - receiving intermittent dosing      1 each Does not apply SEE ADMIN INSTRUCTIONS 10/25/20 1301      10/25/20 1300  norepinephrine (LEVOPHED) 16 mg in  mL infusion      0.03-0.7 mcg/kg/min × 68.9 kg  1.9-45.2 mL/hr  Intravenous CONTINUOUS 10/25/20 1239      10/25/20 1300  vasopressin 40 units in NS 40 mL (PITRESSIN) infusion      2.4 Units/hr  2.4 mL/hr  Intravenous CONTINUOUS 10/25/20 1239            Contrast Orders - past 72 hours (72h ago, onward)    None                Plan:  1.  Patient received 1500 mg at ~0830 this morning. Given renal function, will start on intermittent dosing with next dose due when level anticipate/measrued to be less that <20 mg/L  2.  Goal Trough Level: 15-20 mg/L   3.  Pharmacy will check trough levels as appropriate in 1-3 Days.    4. Serum creatinine levels will be ordered daily for the first week of therapy and at least twice weekly for subsequent weeks.    5. [place] method utilized to dose vancomycin therapy: Method 2    Ricky Rosales [facility]

## 2020-10-25 NOTE — ED NOTES
Bed: [facility]  Expected date: 10/25/20  Expected time: 6:25 AM  Means of arrival: Ambulance  Comments:  Kerri M1 60M respiratory distress

## 2020-10-25 NOTE — PROGRESS NOTES
Assisted with intubation and patient placed on CMV 14, 500, 5, 100 initially. Lowered FiO2 to 50%. Tube is currently at 22@ lips was 20.5 and advanced 1.5 cm per MD. Will continue to follow.

## 2020-10-25 NOTE — ED TRIAGE NOTES
Comes from home. Respiratory distress since midnight, breathing at 30 breaths per minute. SPO2 in high 70s for EMS. Fever of 102.7 at home.

## 2020-10-25 NOTE — PROGRESS NOTES
D/I: Transfer to  from [facility] for sepsis pneumonia requiring ventilation and pressors.    Neuro- Sedated on Propofol  CV-Tacycardic, hypotensive (see flowsheets)  2 liters LR given (3L given at OSH).    Pulm-Vented on CMV settings, 40% peep 5. SaO2 40%.  Lungs sounds diminished. Sputum thick tan. Cx sent.  ETT advanced 3 cm.   GI-GJ in place with slight redness around phlange.     -Lerma with adequate urine.     Gtts-Epi, Norepi, Vaso and Propofol.   Skin-Pale, extremities cold. Coccyx wound noted.. WOC consulted.  Pain-Sedated on Propofol.    See flow sheets for further interventions and assessments.  A: Labs sent. Critical condition. Mother given update by RN.   P: Continue to monitor pt closely. Notify MD of significant changes.

## 2020-10-25 NOTE — PHARMACY-ADMISSION MEDICATION HISTORY
Pharmacy Medication History  Admission medication history interview status for the 10/25/2020  admission is complete. See EPIC admission navigator for prior to admission medications       Medication history sources: Patient's family/friend (Mother Kulwinder)  Location of interview: Outside ER room, face to face with mother  Medication history source reliability: Good  Adherence assessment: Good    Significant changes made to the medication list:  Removed daily azithromycin (mom says rx was from last hospitalization, is now finished)      Additional medication history information:   Notified MD of 0900 medications due, most importantly his Briviact for seizures.  If possible getting it before transfer would be ideal     Medication reconciliation completed by provider prior to medication history? No    Time spent in this activity: 20 mins      Prior to Admission medications    Medication Sig Last Dose Taking? Auth Provider   acetylcysteine (MUCOMYST) 20 % neb solution Take 2 mLs by nebulization 4 times daily With albuterol at 0700, 1100, 1500, and 1900  10/24/2020 at 1900 Yes Unknown, Entered By History   albuterol (PROVENTIL) (5 MG/ML) 0.5% neb solution Take 2.5 mg by nebulization every 4 hours (while awake) 0700 1100 1500 1900 with mucomyst  10/24/2020 at 1900 Yes Unknown, Entered By History   aspirin (ASA) 81 MG chewable tablet 81 mg by Oral or Feeding Tube route daily At 0900 10/24/2020 at 0900 Yes Unknown, Entered By History   Brivaracetam (BRIVIACT) 10 MG/ML solution 100 mg by Oral or Feeding Tube route 2 times daily 0900, 2100 10/24/2020 at 2100 Yes Unknown, Entered By History   calcium carbonate 1250 MG/5ML SUSP suspension Take 1,250 mg by mouth 3 times daily 0900, 1500, 2100 10/24/2020 at 2100 Yes Unknown, Entered By History   carBAMazepine (TEGRETOL) 100 MG/5ML suspension 150 mg by Oral or Feeding Tube route every 6 hours At 06:00, 12:00, 18:00 and 24:00 for seizures  10/25/2020 at 0600 Yes Unknown, Entered By  History   ferrous sulfate 220 (44 Fe) MG/5ML ELIX 220 mg by Per Feeding Tube route daily @ 0900 10/24/2020 at 0900 Yes Unknown, Entered By History   Guar Gum (FIBER MODULAR, NUTRISOURCE FIBER,) packet 1 packet by Per G Tube route daily 10/24/2020 at 0900 Yes Starr Champion MD   hydrocortisone (CORTEF) 5 MG tablet 10 mg by Oral or FT or NG tube route daily (with dinner) At 1500 10/24/2020 at 1500 Yes Unknown, Entered By History   hydrocortisone (CORTEF) 5 MG tablet 20 mg by Oral or FT or NG tube route every morning  10/24/2020 at 0900 Yes Unknown, Entered By History   levothyroxine (SYNTHROID/LEVOTHROID) 150 MCG tablet Take 150 mcg by mouth every morning 10/25/2020 at 0600 Yes Unknown, Entered By History   melatonin (MELATONIN) 1 MG/ML LIQD liquid 6 mg by Per NG tube route At Bedtime  10/24/2020 at Unknown time Yes Unknown, Entered By History   metoclopramide (REGLAN) 10 MG/10ML SOLN solution Take 10 mg by mouth 4 times daily (before meals and nightly) 0800, 1200, 1600, 2000  Disconnects bag before administration, then waits 45 mins before reconnecting after giving the medication 10/24/2020 at 2000 Yes Unknown, Entered By History   Multiple Vitamins-Minerals (EMERGEN-C VITAMIN C) PACK 1 packet by Oral or Feeding Tube route daily Vitamin C 1000 mg 10/24/2020 at Unknown time Yes Unknown, Entered By History   pantoprazole (PROTONIX) 2 mg/mL SUSP suspension 20 mLs (40 mg) by Per J Tube route daily 10/24/2020 at Unknown time Yes Alvaro Barahona MD   potassium & sodium phosphates (NEUTRA-PHOS) 280-160-250 MG Packet Take 1 packet by mouth 3 times daily  10/24/2020 at Unknown time Yes Yanely Liriano MD   Scopolamine HBr POWD Dispense #90. Mix contents with small amount of water for admin via J-tube.  Administer 0.8 mg three times each day. 10/24/2020 at Unknown time Yes Jennie Bermudez MD   sodium bicarbonate 650 MG tablet Take 1 tablet (650 mg) by mouth daily 10/24/2020 at Unknown time Yes Ricky Torres MD    vitamin D3 (CHOLECALCIFEROL) 2000 units (50 mcg) tablet Take 2,000 Units by mouth daily Crush and feed via j-tube @@ 0900 10/24/2020 at 0900 Yes Unknown, Entered By History   bacitracin ointment Apply topically daily as needed for wound care To PEG site.  prn  Unknown, Entered By History   clotrimazole-betamethasone (LOTRISONE) 1-0.05 % external cream Apply topically 2 times daily as needed  prn  Leticia Santiago MD   hydrocortisone 1 % CREA cream Place rectally 2 times daily as needed for other Apply to reddened memo areas as needed prn  Unknown, Entered By History   miconazole (MICATIN) 2 % AERP powder Apply topically 2 times daily as needed  prn  Unknown, Entered By History   mupirocin (BACTROBAN) 2 % external ointment Apply topically 2 times daily as needed  prn  Reported, Patient   prochlorperazine (COMPAZINE) 25 MG suppository Place 1 suppository (25 mg) rectally every 12 hours as needed for nausea or vomiting prn  Alvaro Barahona MD   Skin Protectants, Misc. (BALMEX SKIN PROTECTANT) OINT Externally apply topically 2 times daily as needed (irritation) Applay to reddened memo areas twice daily as needed prn  Unknown, Entered By History   testosterone cypionate (DEPOTESTOTERONE CYPIONATE) 200 MG/ML injection Inject 76 mg into the muscle See Admin Instructions Every 2 weeks on Thursdays  76 mg or 0.38 mL ~1 week ago  Unknown, Entered By History     Kennedi Willoughby, PharmD

## 2020-10-25 NOTE — PROGRESS NOTES
Pt arrived from OSH already intubated.  Pt has 7.5 oral tube, 21cm at the lips.  BBS clear and dimimished t/o.  Pt placed on CMV 18, Vt 500, FiO2 .5, PEEP +5.

## 2020-10-25 NOTE — H&P
MICU Admit Note 10-    MICU Staff      This patient has been seen and evaluated by me.  I have discussed care with the housestaff and agree with the findings and plan in their note. 60 year old male with H/O TBI and chronic aspiration admitted for fever and increasing respiratory distress. Intubated in ED. Hypotensive. Septic shock. Placed on 3 pressors NE/vaso/epi for hypotension. CXR with infiltrates c/w pneumonia - likely aspiration. Started on vanco/zosyn. Noted to have ALMAS with Cr 3.3, baseline < 1. Will get echocardiogram. Blood and sputum cultures sent. Will place arterial line. On AC ventilation with OK ABG obtained while in ED. Will repeat ABG once art line in place. Will continue to give more volume with LR. ETT too high on CXR - will need to advance.     Hank Akers MD  Pulmonary/Critical Care  October 25, 2020 2:35 PM    CCT 2 hours separate from procedure

## 2020-10-25 NOTE — ED NOTES
Verbal orders per MD, from [facility]:    100 mcg of phenilephrine 6:52  100 mcg phenilephrine 6:54  100 mcg phenilephrine 6:56  100 mcg phenilephrine 6:59  100 mcg phenilephrine 7:10  100 mcg of phenilephrine 7:15

## 2020-10-25 NOTE — PROCEDURES
[facility]     Arterial line placement    Date/Time: 10/25/2020 4:00 PM  Performed by: Clover Roth MD  Authorized by: Hank Akers MD     UNIVERSAL PROTOCOL   Site Marked: Yes  Prior Images Obtained and Reviewed:  Yes  Required items: Required blood products, implants, devices and special equipment available    Patient identity confirmed:  Arm band  NA - No sedation, light sedation, or local anesthesia  Confirmation Checklist:  Patient's identity using two indicators, procedure was appropriate and matched the consent or emergent situation and correct equipment/implants were available  Time out: Immediately prior to the procedure a time out was called    Universal Protocol: the Joint Commission Universal Protocol was followed    Preparation: Patient was prepped and draped in usual sterile fashion    ESBL (mL):  10      Indication: multiple ABGs respiratory failure hemodynamic monitoring  Location:  Left femoral       ANESTHESIA    Anesthesia: Local infiltration  Local Anesthetic:  Lidocaine 1% without epinephrine  Anesthetic Total (mL):  4      SEDATION    Patient Sedated: Yes    Sedation:  See MAR for details  Vital signs: Vital signs monitored during sedation      PROCEDURE DETAILS    Needle Gauge:  20  Seldinger technique: Seldinger technique used    Number of Attempts:  2  Post-procedure:  Line sutured and dressing applied  CMS: normal  PROCEDURE Describe Procedure: Verified placement via ultrasound and waveform  Length of time physician/provider present for 1:1 monitoring during sedation: 20

## 2020-10-26 ENCOUNTER — APPOINTMENT (OUTPATIENT)
Dept: CARDIOLOGY | Facility: CLINIC | Age: 60
DRG: 871 | End: 2020-10-26
Attending: INTERNAL MEDICINE
Payer: MEDICARE

## 2020-10-26 ENCOUNTER — ANESTHESIA (OUTPATIENT)
Dept: INTENSIVE CARE | Facility: CLINIC | Age: 60
DRG: 871 | End: 2020-10-26
Payer: MEDICARE

## 2020-10-26 ENCOUNTER — ANESTHESIA EVENT (OUTPATIENT)
Dept: INTENSIVE CARE | Facility: CLINIC | Age: 60
DRG: 871 | End: 2020-10-26
Payer: MEDICARE

## 2020-10-26 LAB
ALBUMIN SERPL-MCNC: 1.8 G/DL (ref 3.4–5)
ALP SERPL-CCNC: 66 U/L (ref 40–150)
ALT SERPL W P-5'-P-CCNC: 24 U/L (ref 0–70)
ANION GAP SERPL CALCULATED.3IONS-SCNC: 9 MMOL/L (ref 3–14)
AST SERPL W P-5'-P-CCNC: 52 U/L (ref 0–45)
BACTERIA SPEC CULT: NO GROWTH
BASE DEFICIT BLDA-SCNC: 1.3 MMOL/L
BASE DEFICIT BLDA-SCNC: 2.2 MMOL/L
BASOPHILS # BLD AUTO: 0 10E9/L (ref 0–0.2)
BASOPHILS NFR BLD AUTO: 0.2 %
BILIRUB SERPL-MCNC: 0.7 MG/DL (ref 0.2–1.3)
BUN SERPL-MCNC: 24 MG/DL (ref 7–30)
CA-I BLD-MCNC: 4.5 MG/DL (ref 4.4–5.2)
CALCIUM SERPL-MCNC: 8 MG/DL (ref 8.5–10.1)
CHLORIDE SERPL-SCNC: 106 MMOL/L (ref 94–109)
CO2 SERPL-SCNC: 21 MMOL/L (ref 20–32)
CREAT SERPL-MCNC: 1.37 MG/DL (ref 0.66–1.25)
DIFFERENTIAL METHOD BLD: ABNORMAL
EOSINOPHIL # BLD AUTO: 0 10E9/L (ref 0–0.7)
EOSINOPHIL NFR BLD AUTO: 0 %
ERYTHROCYTE [DISTWIDTH] IN BLOOD BY AUTOMATED COUNT: 14.6 % (ref 10–15)
GFR SERPL CREATININE-BSD FRML MDRD: 56 ML/MIN/{1.73_M2}
GLUCOSE BLDC GLUCOMTR-MCNC: 110 MG/DL (ref 70–99)
GLUCOSE BLDC GLUCOMTR-MCNC: 111 MG/DL (ref 70–99)
GLUCOSE BLDC GLUCOMTR-MCNC: 120 MG/DL (ref 70–99)
GLUCOSE BLDC GLUCOMTR-MCNC: 122 MG/DL (ref 70–99)
GLUCOSE BLDC GLUCOMTR-MCNC: 135 MG/DL (ref 70–99)
GLUCOSE BLDC GLUCOMTR-MCNC: 138 MG/DL (ref 70–99)
GLUCOSE BLDC GLUCOMTR-MCNC: 138 MG/DL (ref 70–99)
GLUCOSE BLDC GLUCOMTR-MCNC: 144 MG/DL (ref 70–99)
GLUCOSE BLDC GLUCOMTR-MCNC: 149 MG/DL (ref 70–99)
GLUCOSE BLDC GLUCOMTR-MCNC: 150 MG/DL (ref 70–99)
GLUCOSE BLDC GLUCOMTR-MCNC: 151 MG/DL (ref 70–99)
GLUCOSE BLDC GLUCOMTR-MCNC: 157 MG/DL (ref 70–99)
GLUCOSE BLDC GLUCOMTR-MCNC: 157 MG/DL (ref 70–99)
GLUCOSE BLDC GLUCOMTR-MCNC: 161 MG/DL (ref 70–99)
GLUCOSE BLDC GLUCOMTR-MCNC: 162 MG/DL (ref 70–99)
GLUCOSE BLDC GLUCOMTR-MCNC: 164 MG/DL (ref 70–99)
GLUCOSE BLDC GLUCOMTR-MCNC: 165 MG/DL (ref 70–99)
GLUCOSE BLDC GLUCOMTR-MCNC: 186 MG/DL (ref 70–99)
GLUCOSE BLDC GLUCOMTR-MCNC: 221 MG/DL (ref 70–99)
GLUCOSE BLDC GLUCOMTR-MCNC: 235 MG/DL (ref 70–99)
GLUCOSE BLDC GLUCOMTR-MCNC: 256 MG/DL (ref 70–99)
GLUCOSE SERPL-MCNC: 208 MG/DL (ref 70–99)
HCO3 BLD-SCNC: 22 MMOL/L (ref 21–28)
HCO3 BLD-SCNC: 22 MMOL/L (ref 21–28)
HCT VFR BLD AUTO: 29.2 % (ref 40–53)
HGB BLD-MCNC: 9.8 G/DL (ref 13.3–17.7)
IMM GRANULOCYTES # BLD: 0.1 10E9/L (ref 0–0.4)
IMM GRANULOCYTES NFR BLD: 0.7 %
INR PPP: 1.82 (ref 0.86–1.14)
INTERPRETATION ECG - MUSE: NORMAL
INTERPRETATION ECG - MUSE: NORMAL
LACTATE BLD-SCNC: 2.7 MMOL/L (ref 0.7–2)
LACTATE BLD-SCNC: 3.8 MMOL/L (ref 0.7–2)
LYMPHOCYTES # BLD AUTO: 2.3 10E9/L (ref 0.8–5.3)
LYMPHOCYTES NFR BLD AUTO: 13.8 %
Lab: NORMAL
MAGNESIUM SERPL-MCNC: 2.3 MG/DL (ref 1.6–2.3)
MAGNESIUM SERPL-MCNC: 2.3 MG/DL (ref 1.6–2.3)
MCH RBC QN AUTO: 29.8 PG (ref 26.5–33)
MCHC RBC AUTO-ENTMCNC: 33.6 G/DL (ref 31.5–36.5)
MCV RBC AUTO: 89 FL (ref 78–100)
MONOCYTES # BLD AUTO: 0.8 10E9/L (ref 0–1.3)
MONOCYTES NFR BLD AUTO: 4.9 %
NEUTROPHILS # BLD AUTO: 13.7 10E9/L (ref 1.6–8.3)
NEUTROPHILS NFR BLD AUTO: 80.4 %
NRBC # BLD AUTO: 0 10*3/UL
NRBC BLD AUTO-RTO: 0 /100
O2/TOTAL GAS SETTING VFR VENT: 30 %
O2/TOTAL GAS SETTING VFR VENT: 30 %
PCO2 BLD: 29 MM HG (ref 35–45)
PCO2 BLD: 35 MM HG (ref 35–45)
PH BLD: 7.41 PH (ref 7.35–7.45)
PH BLD: 7.48 PH (ref 7.35–7.45)
PHOSPHATE SERPL-MCNC: 1.3 MG/DL (ref 2.5–4.5)
PHOSPHATE SERPL-MCNC: 2 MG/DL (ref 2.5–4.5)
PLATELET # BLD AUTO: 147 10E9/L (ref 150–450)
PO2 BLD: 110 MM HG (ref 80–105)
PO2 BLD: 98 MM HG (ref 80–105)
POTASSIUM SERPL-SCNC: 3.3 MMOL/L (ref 3.4–5.3)
POTASSIUM SERPL-SCNC: 3.3 MMOL/L (ref 3.4–5.3)
POTASSIUM SERPL-SCNC: 3.8 MMOL/L (ref 3.4–5.3)
POTASSIUM SERPL-SCNC: 3.8 MMOL/L (ref 3.4–5.3)
PROT SERPL-MCNC: 5.5 G/DL (ref 6.8–8.8)
RBC # BLD AUTO: 3.29 10E12/L (ref 4.4–5.9)
SODIUM SERPL-SCNC: 136 MMOL/L (ref 133–144)
SPECIMEN SOURCE: NORMAL
VANCOMYCIN SERPL-MCNC: 8.5 MG/L
WBC # BLD AUTO: 17 10E9/L (ref 4–11)

## 2020-10-26 PROCEDURE — 82803 BLOOD GASES ANY COMBINATION: CPT | Performed by: STUDENT IN AN ORGANIZED HEALTH CARE EDUCATION/TRAINING PROGRAM

## 2020-10-26 PROCEDURE — 250N000013 HC RX MED GY IP 250 OP 250 PS 637: Performed by: STUDENT IN AN ORGANIZED HEALTH CARE EDUCATION/TRAINING PROGRAM

## 2020-10-26 PROCEDURE — 80053 COMPREHEN METABOLIC PANEL: CPT | Performed by: INTERNAL MEDICINE

## 2020-10-26 PROCEDURE — 83605 ASSAY OF LACTIC ACID: CPT | Performed by: STUDENT IN AN ORGANIZED HEALTH CARE EDUCATION/TRAINING PROGRAM

## 2020-10-26 PROCEDURE — 83735 ASSAY OF MAGNESIUM: CPT | Performed by: INTERNAL MEDICINE

## 2020-10-26 PROCEDURE — 85610 PROTHROMBIN TIME: CPT | Performed by: INTERNAL MEDICINE

## 2020-10-26 PROCEDURE — 258N000003 HC RX IP 258 OP 636: Performed by: STUDENT IN AN ORGANIZED HEALTH CARE EDUCATION/TRAINING PROGRAM

## 2020-10-26 PROCEDURE — 999N001017 HC STATISTIC GLUCOSE BY METER IP

## 2020-10-26 PROCEDURE — 999N000157 HC STATISTIC RCP TIME EA 10 MIN

## 2020-10-26 PROCEDURE — 93306 TTE W/DOPPLER COMPLETE: CPT | Mod: 26 | Performed by: INTERNAL MEDICINE

## 2020-10-26 PROCEDURE — 82330 ASSAY OF CALCIUM: CPT | Performed by: STUDENT IN AN ORGANIZED HEALTH CARE EDUCATION/TRAINING PROGRAM

## 2020-10-26 PROCEDURE — C9113 INJ PANTOPRAZOLE SODIUM, VIA: HCPCS | Performed by: STUDENT IN AN ORGANIZED HEALTH CARE EDUCATION/TRAINING PROGRAM

## 2020-10-26 PROCEDURE — 250N000011 HC RX IP 250 OP 636: Performed by: INTERNAL MEDICINE

## 2020-10-26 PROCEDURE — 93306 TTE W/DOPPLER COMPLETE: CPT

## 2020-10-26 PROCEDURE — 93005 ELECTROCARDIOGRAM TRACING: CPT

## 2020-10-26 PROCEDURE — 258N000003 HC RX IP 258 OP 636: Performed by: INTERNAL MEDICINE

## 2020-10-26 PROCEDURE — 93010 ELECTROCARDIOGRAM REPORT: CPT | Performed by: INTERNAL MEDICINE

## 2020-10-26 PROCEDURE — 84100 ASSAY OF PHOSPHORUS: CPT | Performed by: INTERNAL MEDICINE

## 2020-10-26 PROCEDURE — G0463 HOSPITAL OUTPT CLINIC VISIT: HCPCS

## 2020-10-26 PROCEDURE — 250N000011 HC RX IP 250 OP 636: Performed by: STUDENT IN AN ORGANIZED HEALTH CARE EDUCATION/TRAINING PROGRAM

## 2020-10-26 PROCEDURE — 94003 VENT MGMT INPAT SUBQ DAY: CPT

## 2020-10-26 PROCEDURE — 250N000009 HC RX 250: Performed by: STUDENT IN AN ORGANIZED HEALTH CARE EDUCATION/TRAINING PROGRAM

## 2020-10-26 PROCEDURE — 999N000185 HC STATISTIC TRANSPORT TIME EA 15 MIN

## 2020-10-26 PROCEDURE — 85025 COMPLETE CBC W/AUTO DIFF WBC: CPT | Performed by: INTERNAL MEDICINE

## 2020-10-26 PROCEDURE — 200N000002 HC R&B ICU UMMC

## 2020-10-26 PROCEDURE — 999N000015 HC STATISTIC ARTERIAL MONITORING DAILY

## 2020-10-26 PROCEDURE — 84132 ASSAY OF SERUM POTASSIUM: CPT | Performed by: INTERNAL MEDICINE

## 2020-10-26 PROCEDURE — 80202 ASSAY OF VANCOMYCIN: CPT | Performed by: INTERNAL MEDICINE

## 2020-10-26 PROCEDURE — 99291 CRITICAL CARE FIRST HOUR: CPT | Mod: GC | Performed by: INTERNAL MEDICINE

## 2020-10-26 PROCEDURE — 272N000078 HC NUTRITION PRODUCT INTERMEDIATE LITER: Performed by: DIETITIAN, REGISTERED

## 2020-10-26 RX ORDER — DEXTROSE MONOHYDRATE 100 MG/ML
INJECTION, SOLUTION INTRAVENOUS CONTINUOUS PRN
Status: DISCONTINUED | OUTPATIENT
Start: 2020-10-26 | End: 2020-11-06 | Stop reason: HOSPADM

## 2020-10-26 RX ORDER — DEXMEDETOMIDINE HYDROCHLORIDE 4 UG/ML
0.2-0.7 INJECTION, SOLUTION INTRAVENOUS CONTINUOUS
Status: DISCONTINUED | OUTPATIENT
Start: 2020-10-26 | End: 2020-10-29

## 2020-10-26 RX ORDER — DEXTROSE MONOHYDRATE 25 G/50ML
25-50 INJECTION, SOLUTION INTRAVENOUS
Status: DISCONTINUED | OUTPATIENT
Start: 2020-10-26 | End: 2020-10-28

## 2020-10-26 RX ORDER — NICOTINE POLACRILEX 4 MG
15-30 LOZENGE BUCCAL
Status: DISCONTINUED | OUTPATIENT
Start: 2020-10-26 | End: 2020-10-28

## 2020-10-26 RX ORDER — GUAR GUM
1 PACKET (EA) ORAL DAILY
Status: DISCONTINUED | OUTPATIENT
Start: 2020-10-26 | End: 2020-11-06 | Stop reason: HOSPADM

## 2020-10-26 RX ORDER — PROPOFOL 10 MG/ML
INJECTION, EMULSION INTRAVENOUS
Status: DISCONTINUED | OUTPATIENT
Start: 2020-10-26 | End: 2020-10-26

## 2020-10-26 RX ADMIN — DOCUSATE SODIUM 50 MG AND SENNOSIDES 8.6 MG 2 TABLET: 8.6; 5 TABLET, FILM COATED ORAL at 20:30

## 2020-10-26 RX ADMIN — VANCOMYCIN HYDROCHLORIDE 1500 MG: 100 INJECTION, POWDER, LYOPHILIZED, FOR SOLUTION INTRAVENOUS at 08:38

## 2020-10-26 RX ADMIN — MICONAZOLE NITRATE: 20 POWDER TOPICAL at 20:30

## 2020-10-26 RX ADMIN — CALCIUM CARBONATE 1250 MG: 1250 SUSPENSION ORAL at 07:48

## 2020-10-26 RX ADMIN — LEVOTHYROXINE SODIUM 150 MCG: 0.15 TABLET ORAL at 07:47

## 2020-10-26 RX ADMIN — PANTOPRAZOLE SODIUM 40 MG: 40 INJECTION, POWDER, FOR SOLUTION INTRAVENOUS at 07:47

## 2020-10-26 RX ADMIN — HYDROCORTISONE SODIUM SUCCINATE 60 MG: 100 INJECTION, POWDER, FOR SOLUTION INTRAMUSCULAR; INTRAVENOUS at 08:39

## 2020-10-26 RX ADMIN — FLUDROCORTISONE ACETATE 100 MCG: 0.1 TABLET ORAL at 07:47

## 2020-10-26 RX ADMIN — MULTIVITAMIN 15 ML: LIQUID ORAL at 15:36

## 2020-10-26 RX ADMIN — HYDROCORTISONE SODIUM SUCCINATE 50 MG: 100 INJECTION, POWDER, FOR SOLUTION INTRAMUSCULAR; INTRAVENOUS at 20:28

## 2020-10-26 RX ADMIN — HYDROCORTISONE SODIUM SUCCINATE 60 MG: 100 INJECTION, POWDER, FOR SOLUTION INTRAMUSCULAR; INTRAVENOUS at 14:20

## 2020-10-26 RX ADMIN — HYDROCORTISONE SODIUM SUCCINATE 60 MG: 100 INJECTION, POWDER, FOR SOLUTION INTRAMUSCULAR; INTRAVENOUS at 02:53

## 2020-10-26 RX ADMIN — SODIUM PHOSPHATE, MONOBASIC, MONOHYDRATE AND SODIUM PHOSPHATE, DIBASIC, ANHYDROUS 20 MMOL: 276; 142 INJECTION, SOLUTION INTRAVENOUS at 06:10

## 2020-10-26 RX ADMIN — PIPERACILLIN AND TAZOBACTAM 3.38 G: 3; .375 INJECTION, POWDER, FOR SOLUTION INTRAVENOUS at 14:02

## 2020-10-26 RX ADMIN — POTASSIUM CHLORIDE 20 MEQ: 29.8 INJECTION, SOLUTION INTRAVENOUS at 12:27

## 2020-10-26 RX ADMIN — PIPERACILLIN AND TAZOBACTAM 3.38 G: 3; .375 INJECTION, POWDER, FOR SOLUTION INTRAVENOUS at 02:17

## 2020-10-26 RX ADMIN — BACITRACIN ZINC: 500 OINTMENT TOPICAL at 14:40

## 2020-10-26 RX ADMIN — CALCIUM CARBONATE 1250 MG: 1250 SUSPENSION ORAL at 17:22

## 2020-10-26 RX ADMIN — DOCUSATE SODIUM 50 MG AND SENNOSIDES 8.6 MG 2 TABLET: 8.6; 5 TABLET, FILM COATED ORAL at 12:27

## 2020-10-26 RX ADMIN — Medication 50 MCG: at 07:48

## 2020-10-26 RX ADMIN — PIPERACILLIN AND TAZOBACTAM 3.38 G: 3; .375 INJECTION, POWDER, FOR SOLUTION INTRAVENOUS at 20:04

## 2020-10-26 RX ADMIN — PIPERACILLIN AND TAZOBACTAM 3.38 G: 3; .375 INJECTION, POWDER, FOR SOLUTION INTRAVENOUS at 07:46

## 2020-10-26 RX ADMIN — SODIUM CHLORIDE, POTASSIUM CHLORIDE, SODIUM LACTATE AND CALCIUM CHLORIDE 1000 ML: 600; 310; 30; 20 INJECTION, SOLUTION INTRAVENOUS at 11:19

## 2020-10-26 RX ADMIN — POTASSIUM CHLORIDE 20 MEQ: 29.8 INJECTION, SOLUTION INTRAVENOUS at 06:09

## 2020-10-26 RX ADMIN — HEPARIN SODIUM 5000 UNITS: 10000 INJECTION, SOLUTION INTRAVENOUS; SUBCUTANEOUS at 04:50

## 2020-10-26 RX ADMIN — Medication 2.5 UNITS/HR: at 03:49

## 2020-10-26 RX ADMIN — HEPARIN SODIUM 5000 UNITS: 10000 INJECTION, SOLUTION INTRAVENOUS; SUBCUTANEOUS at 20:30

## 2020-10-26 RX ADMIN — PROPOFOL 10 MCG/KG/MIN: 10 INJECTION, EMULSION INTRAVENOUS at 06:19

## 2020-10-26 RX ADMIN — MICONAZOLE NITRATE: 20 POWDER TOPICAL at 07:49

## 2020-10-26 RX ADMIN — POTASSIUM CHLORIDE 20 MEQ: 29.8 INJECTION, SOLUTION INTRAVENOUS at 04:47

## 2020-10-26 RX ADMIN — SODIUM PHOSPHATE, MONOBASIC, MONOHYDRATE AND SODIUM PHOSPHATE, DIBASIC, ANHYDROUS 15 MMOL: 276; 142 INJECTION, SOLUTION INTRAVENOUS at 14:00

## 2020-10-26 RX ADMIN — CARBAMAZEPINE 150 MG: 100 SUSPENSION ORAL at 17:22

## 2020-10-26 RX ADMIN — CARBAMAZEPINE 150 MG: 100 SUSPENSION ORAL at 11:18

## 2020-10-26 RX ADMIN — SODIUM BICARBONATE 650 MG TABLET 650 MG: at 07:47

## 2020-10-26 RX ADMIN — HUMAN INSULIN 5 UNITS/HR: 100 INJECTION, SOLUTION SUBCUTANEOUS at 17:31

## 2020-10-26 RX ADMIN — HEPARIN SODIUM 5000 UNITS: 10000 INJECTION, SOLUTION INTRAVENOUS; SUBCUTANEOUS at 11:18

## 2020-10-26 RX ADMIN — DEXMEDETOMIDINE 0.2 MCG/KG/HR: 100 INJECTION, SOLUTION, CONCENTRATE INTRAVENOUS at 12:12

## 2020-10-26 RX ADMIN — BACITRACIN ZINC: 500 OINTMENT TOPICAL at 06:31

## 2020-10-26 RX ADMIN — HUMAN INSULIN 2.5 UNITS/HR: 100 INJECTION, SOLUTION SUBCUTANEOUS at 01:37

## 2020-10-26 RX ADMIN — Medication 2.4 UNITS/HR: at 16:59

## 2020-10-26 RX ADMIN — CARBAMAZEPINE 150 MG: 100 SUSPENSION ORAL at 06:05

## 2020-10-26 RX ADMIN — CALCIUM CARBONATE 1250 MG: 1250 SUSPENSION ORAL at 11:17

## 2020-10-26 RX ADMIN — Medication 1 PACKET: at 17:01

## 2020-10-26 RX ADMIN — BRIVARACETAM 100 MG: 10 SOLUTION ORAL at 08:53

## 2020-10-26 RX ADMIN — BRIVARACETAM 100 MG: 10 SOLUTION ORAL at 20:43

## 2020-10-26 ASSESSMENT — ACTIVITIES OF DAILY LIVING (ADL)
ADLS_ACUITY_SCORE: 40

## 2020-10-26 ASSESSMENT — MIFFLIN-ST. JEOR: SCORE: 1594.13

## 2020-10-26 NOTE — PROGRESS NOTES
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    CHANGES and MAJOR THINGS TODAY:   -Fluid resuscitation: Extra 1L.  -Goal to switch propofol to precedex for conscious sedation.  -Weaning pressor support as able.  -Decreasing fentanyl while titrating precedex.  -Repeat ABG with adequate oxygenation and improvement in respiratory alkalosis.  -PEEP increased to 8.  -Nutrition consult to resume TF.    PLAN:    Neuro:  # Pain and sedation  Analgesia: Fentanyl 25-50 mcg/hr, PRN 25 mcg pushes > transitioning to precedex.  Sedation: Propofol > transitioning to precedex.  - RASS goal 0/-2.     # TBI c/b spastic hemiplegia and severe dysphagia s/p GJ tube  # Pan-hypopituitarism  -Holding TF.  -Switching PTA steroids to stress dose steroids.     # Seizure disorder  -Continue PTA carbamazepine.  -Continue PTA brivaracetam.     # Acute toxic metabolic encephalopathy 2/2 septic shock, hypoxemia  Lethargic at home. Found febrile and sating in the 70s with increased WOB. Electrolytes with mild hyponatremia and hypocalcemia, BUN elevated but no uremia, BS within normal limits, imaging with evidence of bilateral pneumonia. Noted elevated carbamazepine level. Acid base status with mild metabolic acidosis and associated respiratory alkalosis.  -Following infectious workup.  -Will consider head CT, EEG, LP if encephalopathy is persistent.     Pulmonary:  # Acute hypoxemic respiratory failure 2/2 likely  recurrent aspiration pneumonia  Hypoxemia, increased WOB and AMS at home. Intubated on arrival of OSH. Differential: Likely pneumonia from bilateral infiltrates in dependent areas  (suggests aspiration) and elevated procalcitonin and leukocytosis concerning for developing ARDS , CTPE negative for PE, CT did not suggest atelectasis or mucus plug. Of notice, patient has been admitted 10 times this year for sepsis in the setting of recurrent aspiration pneumonia.  -Antibiotics per below.      -Ventilation Mode: CMV/AC  (Continuous Mandatory Ventilation/ Assist Control)  FiO2 (%): 30 %  Rate Set (breaths/minute): 14 breaths/min  Tidal Volume Set (mL): 450 mL  PEEP (cm H2O): 8 cmH2O  Oxygen Concentration (%): 30 %  Resp: 14       Cardiovascular:  # Shock  Hypotensive on presentation, persistently tachycardic and obtunded requiring airway protection. Labs with ALMAS likely prerenal.   Likely septic in the setting of bilateral pneumonia.  -Distributive: Likely from sepsis.  -Obstructive: CXR without PNT, negative CT-PE.  -Hypovolemic: Collapsing IVC suggests hypovolemia.  -Cardiogenic: Bedside TTE (performed by Cardiology) with normal EF, no wall motion abnormalities, of notice, collapsing IVC > Formal TTE on 10/26 with dilated IVC.     GI/Nutrition:  # Severe dysphagia s/p GJ tube  # Concern for gastroparesis  Speech evaluation once extubated and mental status allows.  -Holding PTA metoclopramide.     # Nutrition  Nutrition consult to resume TF.     Renal/Fluids/Electrolytes:  # ALMAS likely prerrenal in the setting of septic shock, improving  Bl Cr: 0.7-0.9  Adm Cr: 3.28; BUN/Cr 15.2  Differential suggesting prerenal with improvement after fluid resuscitation to 2.49. UA with protein and RBC in urine but no blood in dipstick, no leukocyturia.  -Holding from urine studies, will consider if new worsening in Cr.     # Mild hypocalcemia, improved  -S/P 2gr Ca gluconate.    # Mild hyponatremia, improved  In the setting of  hypovolemia.     Endocrine:  # Central Hypothyroidism  -Continue PTA levothyroxine.     # Central adrenal insufficiency  On prednisone 20 mg AM and 10 in PM (total 120mg/day), switched to stress dose steroids 60mg q6H.     ID:  # Septic shock likely from aspiration pneumonia  -Fluid resuscitation: S/P 3L NS at OSH, and 3L LR on admission, adding 1L today.  -Source control: If course remains critical, may require bronchoscopy.  -Pressor support: On NE, , epinephrine, goal MAP >65.  -Steroid support: S/P hydrocortisone 100mg at OSH, continue 60mg q6H.  -Lactate trend: 7.7 > 5.9 > 8.8 > 9.5.  -Micro studies: BC NGTD, UA (no WBC), UC NGTD,Sputum culture/gram, procalcitonin 25, (+) MRSA nares  -Antimicrobials: Vancomycin and zosyn.  -Prior micro: Staph hominis R to methicillin.     Hematology:    # Leukocytosis  Likely in the setting of sepsis.     Musculoskeletal:  # No acute issues     Skin:  # Concern for decubitus ulcer  -WOC consult.     General Cares/Prophylaxis:    DVT Prophylaxis: Heparin SQ  GI Prophylaxis: PPI  Restraints: None.  Family Communication: Updated in person.  Code Status: Full code per prior code status, unable to discuss with patient.     Lines/tubes/drains:  - Glucose: Medium ISS.  - Head: 30 degree elevation.  - Lines: R femoral CVC, L femoral A line.     Disposition:  - Medical ICU.     Patient seen and findings/plan discussed with medical ICU staff, Dr. Carrillo.     Abilio Fine MD  Internal Medicine Resident PG-Y2  MICU service  Pager: [phone]  ====================================  INTERVAL HISTORY:   No acute events overnight. Decreasing pressor requirements overnight. Afebrile. Adequate UOP. No BM since admission.    OBJECTIVE:   1. VITAL SIGNS:   Temp:  [97.5  F (36.4  C)-101.3  F (38.5  C)] 97.5  F (36.4  C)  Pulse:  [] 73  Resp:  [14-16] 14  BP: ()/(49-71) 107/58  MAP:  [44 mmHg-101 mmHg] 73 mmHg  Arterial Line BP: ()/(44-79) 107/51  FiO2 (%):  [30  %-40 %] 30 %  SpO2:  [94 %-100 %] 98 %  Ventilation Mode: CMV/AC  (Continuous Mandatory Ventilation/ Assist Control)  FiO2 (%): 30 %  Rate Set (breaths/minute): 14 breaths/min  Tidal Volume Set (mL): 450 mL  PEEP (cm H2O): 8 cmH2O  Oxygen Concentration (%): 30 %  Resp: 14    2. INTAKE/ OUTPUT:   I/O last 3 completed shifts:  In: 5511.8 [I.V.:1391.8; NG/GT:120; IV Piggyback:4000]  Out: 2225 [Urine:2225]    3. PHYSICAL EXAMINATION:  General: RASS -2/-3, NAD.  HEENT: NC, AT, PERRLA, EOMI.  Neuro: Sedated, moves extremities spontaneously to pain.  Pulm/Resp: Clear breath sounds bilaterally without rhonchi, crackles or wheeze, breathing non-labored, synchronous to ventilator.  CV: RRR, no rubs/murmurs/gallops.  Abdomen: Soft, non-distended, non-tender.  : Lerma catheter in place, urine yellow and clear, femoral lines.    4. LABS:   Arterial Blood Gases   Recent Labs   Lab 10/26/20  1130 10/26/20  0404 10/25/20  2100 10/25/20  1645   PH 7.41 7.48* 7.30* 7.30*   PCO2 35 29* 32* 31*   PO2 110* 98 134* 149*   HCO3 22 22 16* 15*     Complete Blood Count   Recent Labs   Lab 10/26/20  0400 10/25/20  1345 10/25/20  0742   WBC 17.0* 27.2* 23.1*   HGB 9.8* 11.7* 12.6*   * 224 220     Basic Metabolic Panel  Recent Labs   Lab 10/26/20  1130 10/26/20  0400 10/26/20  0130 10/25/20  1345 10/25/20  0742   NA  --  136  --  135 132*   POTASSIUM 3.3* 3.3* 3.8 4.1 3.5   CHLORIDE  --  106  --  102 96   CO2  --  21  --  16* 22   BUN  --  24  --  41* 50*   CR  --  1.37*  --  2.49* 3.28*   GLC  --  208*  --  219* 106*     Liver Function Tests  Recent Labs   Lab 10/26/20  0400 10/25/20  1345 10/25/20  0742   AST 52* 42 27   ALT 24 15 15   ALKPHOS 66 83 72   BILITOTAL 0.7 1.2 0.9   ALBUMIN 1.8* 2.2* 2.1*   INR 1.82* 1.86* 1.89*     Coagulation Profile  Recent Labs   Lab 10/26/20  0400 10/25/20  1345 10/25/20  0742   INR 1.82* 1.86* 1.89*   PTT  --  38*  --        5. RADIOLOGY:   Recent Results (from the past 24 hour(s))   XR Chest Port 1  View    Narrative    XR CHEST PORT 1 VW10/25/2020 1:27 PM    INDICATION: Recurrent aspiration pneumonia    COMPARISON:  Same-day CT and plain film films done yesterday    FINDINGS: AP view of the chest. Endotracheal tube tip approximately 9  cm from the marcella. Low lung volumes. Cardiac mediastinal silhouette  is unchanged when compared with prior exam. Cardiac resuscitation  paddles. Streaky bibasilar opacities. No pneumothorax or pleural  effusion.      Impression    IMPRESSION:   Endotracheal tube remains 9 cm from the marcella. Low lung volumes with  streaky bibasilar and perihilar opacities, likely atelectasis.    I have personally reviewed the examination and initial interpretation  and I agree with the findings.    KADE KYLE MD   XR Abdomen Port 1 View    Narrative    Exam: XR ABDOMEN PORT 1 , 10/25/2020 8:03 PM    Indication: OG verification    Comparison: Same day abdominal x-ray.    Findings:   AP portable single view of the abdomen. Tip of the enteric gastric  tube projects over the distal stomach. Gastrojejunostomy tube with tip  in the proximal jejunum. Nonobstructive bowel gas pattern. Contrast in  the colon.      Impression    Impression: Tip of the enteric gastric tube projects over the distal  stomach.    I have personally reviewed the examination and initial interpretation  and I agree with the findings.    ALEJO THORPE MD   XR Chest Port 1 View    Narrative    Exam: XR CHEST PORT 1 , 10/25/2020 8:21 PM    Indication: Please assess ETT repositioning    Comparison: Chest x-ray 10/25/2020.    Findings:   Portable single view of the chest. Tip of the endotracheal tube  projects over the midthoracic trachea. Tip of the enteric gastric  projects over the proximal gastric body. Low lung volumes with grossly  unchanged perihilar and bibasilar opacity. Trace left-sided pleural  effusion. Pulmonary vascularity is indistinct.       Impression    Impression:   1. Tip of the endotracheal  tube projects over the midthoracic trachea.  2. Low lung volumes with perihilar and streaky bibasilar opacity  representing edema, atelectasis, and/or infection. Small left-sided  pleural effusion.    I have personally reviewed the examination and initial interpretation  and I agree with the findings.    ALEJO THORPE MD   XR Abdomen Port 1 View    Narrative    Exam: XR ABDOMEN PORT 1 VW, 10/25/2020 8:08 PM    Indication: OG tube retracted    Comparison: Abdominal x-ray 10/25/2020 8:04 PM.    Findings:   AP portable single view of the abdomen. Tip of the enteric gastric  tube projects over the distal gastric body. Gastrojejunostomy tube.  Nonobstructive bowel gas pattern. No pneumatosis or portal venous gas.  Streaky left basilar opacity, atelectasis versus infection.      Impression    Impression:   Tip of the enteric gastric tube now projects over the distal gastric  body.    I have personally reviewed the examination and initial interpretation  and I agree with the findings.    ALEJO THORPE MD   Echo Complete    Narrative    [id number]  [id number]  [id number]  [id number]^DRE RAINEY^FIONA^SHANNAN           [facility],[place]  Echocardiography Laboratory  [address] [place] [id number]     Name: BERT BARAJAS  MRN: [id number]  : 1960  Study Date: 10/26/2020 07:12 AM  Age: 60 yrs  Gender: Male  Patient Location: [facility]  Reason For Study: Shock  Ordering Physician: FIONA MENDEZ  Referring Physician: TEODORA LAWTON  Performed By: Josue Yanez     BSA: 1.9 m2  Height: 72 in  Weight: 162 lb  HR: 74  BP: 113/57 mmHg  _____________________________________________________________________________  __        Procedure  Complete Portable Echo Adult.  _____________________________________________________________________________  __        Interpretation Summary  Global and regional left ventricular function is normal with an EF of 55-60%.  Right ventricular  function, chamber size, wall motion, and thickness are  normal.  Pulmonary artery systolic pressure cannot be assessed.  Dilation of the inferior vena cava is present with abnormal respiratory  variation in diameter.  No pericardial effusion is present.  A left pleural effusion is present.  _____________________________________________________________________________  __        Left Ventricle  Global and regional left ventricular function is normal with an EF of 55-60%.  Left ventricular size is normal. Borderline concentric wall thickening  consistent with left ventricular hypertrophy is present. Left ventricular  diastolic function is normal. No regional wall motion abnormalities are seen.     Right Ventricle  Right ventricular function, chamber size, wall motion, and thickness are  normal.     Atria  The atria cannot be assessed.     Mitral Valve  Chordal SURI noted. Trace to mild mitral insufficiency is present.        Aortic Valve  Aortic valve is normal in structure and function. Trileaflet aortic sclerosis  without stenosis.     Tricuspid Valve  The tricuspid valve is normal. Trace tricuspid insufficiency is present.  Pulmonary artery systolic pressure cannot be assessed.     Pulmonic Valve  The pulmonic valve is normal. Trace pulmonic insufficiency is present.     Vessels  The pulmonary artery and bifurcation cannot be assessed. Sinuses of Valsalva  3.7 cm. Ascending aorta 2.5 cm. Aortic arch 3.9 cm. Dilation of the inferior  vena cava is present with abnormal respiratory variation in diameter.     Pericardium  No pericardial effusion is present.        Miscellaneous  A left pleural effusion is present.  _____________________________________________________________________________  __  MMode/2D Measurements & Calculations  IVSd: 1.4 cm     LVIDd: 4.4 cm  LVIDs: 3.3 cm  LVPWd: 1.1 cm  FS: 26.2 %  LV mass(C)d: 193.8 grams  LV mass(C)dI: 99.5 grams/m2  asc Aorta Diam: 3.5 cm  RWT: 0.48  TAPSE: 1.5 cm         Doppler Measurements & Calculations  MV E max brandie: 84.8 cm/sec  MV A max brandie: 58.5 cm/sec  MV E/A: 1.5  MV dec slope: 502.2 cm/sec2  MV dec time: 0.17 sec  Medial E/e': 10.3        _____________________________________________________________________________  __        Report approved by: Gerald Sosa 10/26/2020 09:26 AM

## 2020-10-26 NOTE — PROGRESS NOTES
CLINICAL NUTRITION SERVICES - ASSESSMENT NOTE     Nutrition Prescription    RECOMMENDATIONS FOR MDs/PROVIDERS TO ORDER:  Monitor lytes, continue to replace PRN if / when low.    Malnutrition Status:    Pt does not meet two of the established criteria necessary for diagnosing malnutrition but is at risk for malnutrition.    Recommendations already ordered by Registered Dietitian (RD):  Ordered Nutren 1.5 @ 55 mL/hr to provide 1980 kcals (27 kcal/kg/day), 90 g PRO (1.2 g/kg/day), 1003 mL H2O, 232 g CHO and no fiber daily.  Ordered Certavite multivitamin/mineral (15 ml/day via J tube) to help ensure micronutrient needs being met with suspected hypermetabolic demands and potential interruptions to TF infusions.    Future/Additional Recommendations:  Change back to standard TF regimen (Isosource 1.5 @ 100 mL/hr x 12 hrs) once pt has recovered from sepsis and is off pressors.     REASON FOR ASSESSMENT  Keyon Farias is a 60 year old male assessed by the dietitian for Provider Order - Registered Dietitian to Assess and Order TF per Medical Nutrition Therapy Protocol (admission screen not filled out yet)    NUTRITION HISTORY  Pressor needs going down. Pt has been on chronic TF via GJ tube for many years. He has a h/o severe dysphagia. TF are sole source of nutrition.  Pressor needs are trending down.    CURRENT NUTRITION ORDERS  Diet: NPO  Intake/Tolerance: n/a    LABS  Labs reviewed- phos is low at 2 (up from 1.3), K+ is low at 3.3  Negative urinary ketones 10/25    Pt known to our service, typically receives Isosource 1.5 @ 100 mL/hr x 12 hrs (or 50 mL/hr continuous when in ICU).    MEDICATIONS  Medications reviewed- CaCO3,     ANTHROPOMETRICS  Height: 182.9 cm  Most Recent Weight: 73.6 kg (162 lb 4.1 oz)    IBW: 80.9 kg  BMI: Normal BMI  Weight History:   Wt Readings from Last 10 Encounters:   10/26/20 73.6 kg (162 lb 4.1 oz)   10/25/20 68.9 kg (151 lb 12.8 oz)   10/23/20 72.6 kg (160 lb)   10/14/20 73.5 kg (162 lb)    09/25/20 73.7 kg (162 lb 6.4 oz)   08/22/20 69.9 kg (154 lb 3.2 oz)   08/15/20 74.2 kg (163 lb 9.3 oz)   05/17/20 67.2 kg (148 lb 2.4 oz)   05/06/20 74.2 kg (163 lb 8 oz)   03/26/20 74.8 kg (165 lb)   08/27/18 77.6 kg    Dosing Weight: 74 kg (adm wt)    ASSESSED NUTRITION NEEDS  Estimated Energy Needs: 4698-2975+ kcals/day (20 - 25+ kcals/kg)  Justification: Maintenance  Estimated Protein Needs:  g/day (1.2-1.5 g/kg)  Justification: Increased needs with sepsis  Estimated Fluid Needs: Per provider pending fluid status    PHYSICAL FINDINGS  See malnutrition section below.  Thin d/t chronic medical issues s/p TBI; has minimal muscle mass from TBI many years ago  Pale    MALNUTRITION  % Intake: No decreased intake noted  % Weight Loss: None noted  Subcutaneous Fat Loss: None noted-  No wasting per say, pt is simply thin  Muscle Loss: None observed -  No wasting per say, pt is simply thin d/t chronic medical issues  Fluid Accumulation/Edema: Does not meet criteria (generalized trace edema)  Malnutrition Diagnosis: Pt does not meet two of the established criteria necessary for diagnosing malnutrition but is at risk for malnutrition    NUTRITION DIAGNOSIS  Predicted inadequate nutrient intake (calories, protein) related to on TF for nutrition therapy, risk for interruptions to TF infusion.      INTERVENTIONS  Implementation  Nutrition Education: Not appropriate at this time due to patient condition   Enteral Nutrition - Initiate  Multivitamin/mineral supplement therapy  Nutrition-related medication management     Goals  Total avg nutritional intake to meet a minimum of 20 kcal/kg and 1.2 g PRO/kg daily (per dosing wt 74 kg).     Monitoring/Evaluation  Progress toward goals will be monitored and evaluated per protocol.    Amara Méndez RD, LD  ([facility] dietitian, [phone] (Mon-Fri))

## 2020-10-26 NOTE — PROGRESS NOTES
SPIRITUAL HEALTH SERVICES   [facility] ([place]) 4ab    Pt was blessed with healing oil by Father Becka Merino   Pager [phone]

## 2020-10-26 NOTE — PLAN OF CARE
4A - Pt with femoral arterial line, intubated and not following commands. Will hold PT evaluation for a few days and re-assess in hopes for decreased sedation and increased participation with therapies.

## 2020-10-26 NOTE — PHARMACY
Pharmacy Tube Feeding Consult    Medication reviewed for administration by feeding tube and for potential food/drug interactions.    Recommendation: Dilute carbamazepine suspension 1:1 with water if administered via a feeding tube. Consider dividing carbamazepine daily dose into four equal doses of suspension. Monitor serum concentrations    Pharmacy will continue to follow as new medications are ordered.

## 2020-10-26 NOTE — PLAN OF CARE
S:  Rouses to verbal stim, successful PS trial, fluid bolus  B:  Weaned off prop and fent.  Dex started.  Pt rouses to auditory stim but doesn't follow commands.  Purposeful mvmt of LUE.         Failed PS trial in am.  Currently PS trial of 7/8 successful w/ MV 6-6.5 and -375.  Afebrile.  Secretions req sux q 3 h sm to mod amts cloudy.       Alkalotic AGB at 0400 so tv dropped from 500 to 450 and peep inc from 5 to 8.  Subsequent ABG wnl.  Peak pressures wnl.       Noon lactic 2.7.  1000cc LR bolus given.  Able to wean norepi down to minimal dose but not off.  Remains on vaso at 2.4.         Senna given w/o results.  Tubefeeding started at 1600 via JT.  GT placed to gravity.         Pressure areas noted on sacrum, back and groin and lip.  WOC in to assess.        Mother in, questions appropriate, will be updated by MICU via phone.  Homecare RN agency rep called, # given to  to follow up with.  A:  As noted.  R:  Cont w/ care plan.

## 2020-10-26 NOTE — PLAN OF CARE
[id number]    NEURO:sedated on propofol and fentanyl. Pupils sluggish/reactive. Withdraws in all extremities. Moves LUE spontaneously. tremorous movement to RUE/minimal movement & only withdraws to deep painful stimuli. Strong cough. Not following any commands & does not awaken to voice.  CV:less tachy after epi weaned off @ 2300 w/ HR NSR mostly 70s-80s. No ectopy observed. Weaning vaso & levo as able to meet MAP goal >65. Initially w/ low grade temps but now normothermic. Troponin downtrending  RESP:CMV w/ fio2 30%. Lungs diminished. Small amount creamy secretions from ETT. PIPs WNL.   GI:g/j tube clamped. D/w MICU OSH charted coffee ground output from OG but appears to be bilious in OG at this time. Per MICU OG to remain clamped. Abdomen soft. BS hypoactive. No BM. Insulin gtt initiated for elevated BG.   :via aaron cath. Initially high output. Slowing down this AM. Creatinine trending down    SHIFT EVENTS:    2120. D/w micu attending abg results. No interventions. Lactate trending down @ 7.8. No fluid at this time.     -d/w micu lactate continues to trend down @ 3.8 & per MICU no need to continue trending overnight.     -lytes replaced per protocol    Please see flowsheets for further interventions/assessments      PLAN:continue to monitor pt & notify MD of any acute changes or concerns. IV abx. Await cx sensitivities.     Shaneka Brown RN on 10/26/2020 at 5:58 AM

## 2020-10-26 NOTE — PLAN OF CARE
OT 4A: Cancel.  Acknowledge orders placed for OT eval and treat.  Pt with femoral arterial line, intubated and not following commands. Will hold PT evaluation for a few days and re-assess in hopes for decreased sedation and increased participation with therapies.

## 2020-10-26 NOTE — CONSULTS
[facility] Nurse Inpatient Pressure Injury Assessment   Reason for consultation: Evaluate and treat coccyx      ASSESSMENT  Pressure Injury: on coccyx , present on admission ,   Pressure Injury is Stage Deep Tissue Pressure Injury (DTPI)   Contributing factor of the pressure injury: pressure, shear, immobility and moisture  Status: initial assessment     Pressure Injury: on right mid back , present on admission ,   This is a Medical Device Related Pressure Injury (MDRPI) due to unknown  Pressure Injury is Stage Deep Tissue Pressure Injury (DTPI)   Contributing factor of the pressure injury: pressure, shear and immobility  Status: initial assessment    Pressure Injury: on right posterior groin , present on admission ,   This is a Medical Device Related Pressure Injury (MDRPI) due to IV tubing  Pressure Injury is Stage 1   Contributing factor of the pressure injury: pressure, shear and immobility  Status: initial assessment  Recommend provider order: None, at this time     TREATMENT PLAN  Back, coccyx, right posterior groin wound: Every 3 days and PRN cleanse with saline and pat dry. Paint memo wound skin with no skin. Conform mepilex over wound. If dressing becomes soiled more than 1x per shift switch to incontinence protocol.   Orders Written  WO Nurse follow-up plan:weekly  Nursing to notify the Provider(s) and re-consult the WO Nurse if wound(s) deteriorates or new skin concern.    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    Objective  Data  Containment of urine/stool: Incontinent pad in bed    Current Diet/ Nutrition:  Orders Placed This Encounter      NPO for Medical/Clinical Reasons Except for: Other; Specify: Can give meds through J tube    Output:   I/O last 3 completed shifts:  In: 5511.8 [I.V.:1391.8; NG/GT:120; IV Piggyback:4000]  Out: 2225 [Urine:2225]    Risk Assessment:   Sensory Perception: 1-->completely limited  Moisture: 4-->rarely moist  Activity: 1-->bedfast  Mobility: 1-->completely immobile  Nutrition: 2-->probably inadequate  Friction and Shear: 1-->problem  Ricic Score: 10    Labs:   Recent Labs   Lab 10/26/20  0400   ALBUMIN 1.8*   HGB 9.8*   INR 1.82*   WBC 17.0*       Physical Exam  Skin inspection: focused back, coccyx, posterior legs    Wound Location:  Sacrum    Date of last Photo 10/26  Wound History: POA  Measurements (length x width x depth, in cm) deep purple area 3.4 x 1.6 x 0 cm TOTAL area 11 cm x 8 cm  x  0 cm   Wound Base:  100 % non-blanchable, erythema, maroon, purple and epidermis  Tunneling N/A  Undermining N/A  Palpation of the wound bed: normal   Periwound skin: rash  Color: red  Temperature: normal   Drainage:, none  Description of drainage: none  Odor: none  Pain: no grimacing or signs of discomfort, none     Wound Location:  Right mid back    Date of last Photo 10/26  Wound History: POA  Measurements (length x width x depth, in cm) 2.7 cm x 2 cm  x  0 cm   Wound Base:  100 % non-blanchable, erythema, maroon, purple and epidermis  Tunneling N/A  Undermining N/A  Palpation of the wound bed: normal   Periwound skin: intact  Color: normal and consistent with surrounding tissue  Temperature: normal   Drainage:, none  Description of drainage: none  Odor: none  Pain: no grimacing or signs of discomfort, none    Wound Location:  Right posterior leg/groin    Date of last Photo 10/26  Wound History: POA  Measurements (length x width x depth, in cm) 8 cm x 0.3 cm  x  0 cm   Wound Base:  100 % non-blanchable and  erythema  Tunneling N/A  Undermining N/A  Palpation of the wound bed: normal   Periwound skin: intact  Color: normal and consistent with surrounding tissue  Temperature: normal   Drainage:, none  Description of drainage: none  Odor: none  Pain: no grimacing or signs of discomfort, none    Interventions  Current support surface: Standard  Low air loss mattress with pulsation   Current off-loading measures: Pillows and Wedge positioning system  Repositioning aid: Turn and Position System and Pillows  Visual inspection of wound(s) completed   Wound Care: was done per plan of care.  Supplies: discussed with RN  Educated provided: importance of repositioning and plan of care  Education provided to: nurse  Discussed importance of:their role in pressure injury prevention  Discussed plan of care with Nurse    Juana Sheriff RN CWOCN

## 2020-10-27 ENCOUNTER — APPOINTMENT (OUTPATIENT)
Dept: ULTRASOUND IMAGING | Facility: CLINIC | Age: 60
DRG: 871 | End: 2020-10-27
Attending: INTERNAL MEDICINE
Payer: MEDICARE

## 2020-10-27 LAB
ALBUMIN SERPL-MCNC: 1.8 G/DL (ref 3.4–5)
ALP SERPL-CCNC: 52 U/L (ref 40–150)
ALT SERPL W P-5'-P-CCNC: 28 U/L (ref 0–70)
ANION GAP SERPL CALCULATED.3IONS-SCNC: 6 MMOL/L (ref 3–14)
AST SERPL W P-5'-P-CCNC: 52 U/L (ref 0–45)
BASOPHILS # BLD AUTO: 0 10E9/L (ref 0–0.2)
BASOPHILS # BLD AUTO: 0 10E9/L (ref 0–0.2)
BASOPHILS NFR BLD AUTO: 0.1 %
BASOPHILS NFR BLD AUTO: 0.1 %
BILIRUB SERPL-MCNC: 0.4 MG/DL (ref 0.2–1.3)
BUN SERPL-MCNC: 20 MG/DL (ref 7–30)
CALCIUM SERPL-MCNC: 7.5 MG/DL (ref 8.5–10.1)
CHLORIDE SERPL-SCNC: 110 MMOL/L (ref 94–109)
CO2 SERPL-SCNC: 23 MMOL/L (ref 20–32)
CREAT SERPL-MCNC: 0.95 MG/DL (ref 0.66–1.25)
DIFFERENTIAL METHOD BLD: ABNORMAL
DIFFERENTIAL METHOD BLD: ABNORMAL
EOSINOPHIL # BLD AUTO: 0 10E9/L (ref 0–0.7)
EOSINOPHIL # BLD AUTO: 0 10E9/L (ref 0–0.7)
EOSINOPHIL NFR BLD AUTO: 0 %
EOSINOPHIL NFR BLD AUTO: 0 %
ERYTHROCYTE [DISTWIDTH] IN BLOOD BY AUTOMATED COUNT: 14.7 % (ref 10–15)
ERYTHROCYTE [DISTWIDTH] IN BLOOD BY AUTOMATED COUNT: 14.8 % (ref 10–15)
FIBRINOGEN PPP-MCNC: 449 MG/DL (ref 200–420)
GFR SERPL CREATININE-BSD FRML MDRD: 88 ML/MIN/{1.73_M2}
GLUCOSE BLDC GLUCOMTR-MCNC: 102 MG/DL (ref 70–99)
GLUCOSE BLDC GLUCOMTR-MCNC: 107 MG/DL (ref 70–99)
GLUCOSE BLDC GLUCOMTR-MCNC: 110 MG/DL (ref 70–99)
GLUCOSE BLDC GLUCOMTR-MCNC: 122 MG/DL (ref 70–99)
GLUCOSE BLDC GLUCOMTR-MCNC: 122 MG/DL (ref 70–99)
GLUCOSE BLDC GLUCOMTR-MCNC: 127 MG/DL (ref 70–99)
GLUCOSE BLDC GLUCOMTR-MCNC: 128 MG/DL (ref 70–99)
GLUCOSE BLDC GLUCOMTR-MCNC: 129 MG/DL (ref 70–99)
GLUCOSE BLDC GLUCOMTR-MCNC: 131 MG/DL (ref 70–99)
GLUCOSE BLDC GLUCOMTR-MCNC: 143 MG/DL (ref 70–99)
GLUCOSE BLDC GLUCOMTR-MCNC: 147 MG/DL (ref 70–99)
GLUCOSE BLDC GLUCOMTR-MCNC: 147 MG/DL (ref 70–99)
GLUCOSE BLDC GLUCOMTR-MCNC: 153 MG/DL (ref 70–99)
GLUCOSE BLDC GLUCOMTR-MCNC: 165 MG/DL (ref 70–99)
GLUCOSE BLDC GLUCOMTR-MCNC: 169 MG/DL (ref 70–99)
GLUCOSE BLDC GLUCOMTR-MCNC: 174 MG/DL (ref 70–99)
GLUCOSE BLDC GLUCOMTR-MCNC: 175 MG/DL (ref 70–99)
GLUCOSE BLDC GLUCOMTR-MCNC: 188 MG/DL (ref 70–99)
GLUCOSE BLDC GLUCOMTR-MCNC: 91 MG/DL (ref 70–99)
GLUCOSE SERPL-MCNC: 174 MG/DL (ref 70–99)
HCT VFR BLD AUTO: 24.7 % (ref 40–53)
HCT VFR BLD AUTO: 25.7 % (ref 40–53)
HGB BLD-MCNC: 8.1 G/DL (ref 13.3–17.7)
HGB BLD-MCNC: 8.2 G/DL (ref 13.3–17.7)
IMM GRANULOCYTES # BLD: 0 10E9/L (ref 0–0.4)
IMM GRANULOCYTES # BLD: 0.1 10E9/L (ref 0–0.4)
IMM GRANULOCYTES NFR BLD: 0.5 %
IMM GRANULOCYTES NFR BLD: 0.8 %
LACTATE BLD-SCNC: 2.9 MMOL/L (ref 0.7–2)
LDH SERPL L TO P-CCNC: 141 U/L (ref 85–227)
LYMPHOCYTES # BLD AUTO: 1.2 10E9/L (ref 0.8–5.3)
LYMPHOCYTES # BLD AUTO: 1.2 10E9/L (ref 0.8–5.3)
LYMPHOCYTES NFR BLD AUTO: 14.1 %
LYMPHOCYTES NFR BLD AUTO: 15 %
MAGNESIUM SERPL-MCNC: 2.1 MG/DL (ref 1.6–2.3)
MCH RBC QN AUTO: 29.1 PG (ref 26.5–33)
MCH RBC QN AUTO: 29.9 PG (ref 26.5–33)
MCHC RBC AUTO-ENTMCNC: 31.9 G/DL (ref 31.5–36.5)
MCHC RBC AUTO-ENTMCNC: 32.8 G/DL (ref 31.5–36.5)
MCV RBC AUTO: 91 FL (ref 78–100)
MCV RBC AUTO: 91 FL (ref 78–100)
MONOCYTES # BLD AUTO: 0.4 10E9/L (ref 0–1.3)
MONOCYTES # BLD AUTO: 0.6 10E9/L (ref 0–1.3)
MONOCYTES NFR BLD AUTO: 5.1 %
MONOCYTES NFR BLD AUTO: 7.2 %
NEUTROPHILS # BLD AUTO: 6.2 10E9/L (ref 1.6–8.3)
NEUTROPHILS # BLD AUTO: 6.7 10E9/L (ref 1.6–8.3)
NEUTROPHILS NFR BLD AUTO: 77.8 %
NEUTROPHILS NFR BLD AUTO: 79.3 %
NRBC # BLD AUTO: 0 10*3/UL
NRBC # BLD AUTO: 0 10*3/UL
NRBC BLD AUTO-RTO: 0 /100
NRBC BLD AUTO-RTO: 0 /100
PHOSPHATE SERPL-MCNC: 1.7 MG/DL (ref 2.5–4.5)
PHOSPHATE SERPL-MCNC: 1.9 MG/DL (ref 2.5–4.5)
PLATELET # BLD AUTO: 94 10E9/L (ref 150–450)
PLATELET # BLD AUTO: 95 10E9/L (ref 150–450)
POTASSIUM SERPL-SCNC: 3.9 MMOL/L (ref 3.4–5.3)
PROT SERPL-MCNC: 5.1 G/DL (ref 6.8–8.8)
RBC # BLD AUTO: 2.71 10E12/L (ref 4.4–5.9)
RBC # BLD AUTO: 2.82 10E12/L (ref 4.4–5.9)
RETICS # AUTO: 43.6 10E9/L (ref 25–95)
RETICS/RBC NFR AUTO: 1.6 % (ref 0.5–2)
SODIUM SERPL-SCNC: 139 MMOL/L (ref 133–144)
WBC # BLD AUTO: 7.8 10E9/L (ref 4–11)
WBC # BLD AUTO: 8.6 10E9/L (ref 4–11)

## 2020-10-27 PROCEDURE — 250N000009 HC RX 250: Performed by: STUDENT IN AN ORGANIZED HEALTH CARE EDUCATION/TRAINING PROGRAM

## 2020-10-27 PROCEDURE — 250N000011 HC RX IP 250 OP 636: Performed by: STUDENT IN AN ORGANIZED HEALTH CARE EDUCATION/TRAINING PROGRAM

## 2020-10-27 PROCEDURE — 99291 CRITICAL CARE FIRST HOUR: CPT | Mod: GC | Performed by: INTERNAL MEDICINE

## 2020-10-27 PROCEDURE — 80053 COMPREHEN METABOLIC PANEL: CPT | Performed by: INTERNAL MEDICINE

## 2020-10-27 PROCEDURE — 85025 COMPLETE CBC W/AUTO DIFF WBC: CPT | Performed by: INTERNAL MEDICINE

## 2020-10-27 PROCEDURE — 93970 EXTREMITY STUDY: CPT

## 2020-10-27 PROCEDURE — 250N000011 HC RX IP 250 OP 636: Performed by: INTERNAL MEDICINE

## 2020-10-27 PROCEDURE — 250N000013 HC RX MED GY IP 250 OP 250 PS 637: Performed by: STUDENT IN AN ORGANIZED HEALTH CARE EDUCATION/TRAINING PROGRAM

## 2020-10-27 PROCEDURE — 258N000003 HC RX IP 258 OP 636: Performed by: INTERNAL MEDICINE

## 2020-10-27 PROCEDURE — 258N000003 HC RX IP 258 OP 636: Performed by: STUDENT IN AN ORGANIZED HEALTH CARE EDUCATION/TRAINING PROGRAM

## 2020-10-27 PROCEDURE — 85060 BLOOD SMEAR INTERPRETATION: CPT | Performed by: PATHOLOGY

## 2020-10-27 PROCEDURE — 999N001017 HC STATISTIC GLUCOSE BY METER IP

## 2020-10-27 PROCEDURE — 93970 EXTREMITY STUDY: CPT | Mod: 26 | Performed by: RADIOLOGY

## 2020-10-27 PROCEDURE — 83605 ASSAY OF LACTIC ACID: CPT | Performed by: INTERNAL MEDICINE

## 2020-10-27 PROCEDURE — 84100 ASSAY OF PHOSPHORUS: CPT | Performed by: INTERNAL MEDICINE

## 2020-10-27 PROCEDURE — 200N000002 HC R&B ICU UMMC

## 2020-10-27 PROCEDURE — 272N000078 HC NUTRITION PRODUCT INTERMEDIATE LITER

## 2020-10-27 PROCEDURE — 85025 COMPLETE CBC W/AUTO DIFF WBC: CPT | Performed by: STUDENT IN AN ORGANIZED HEALTH CARE EDUCATION/TRAINING PROGRAM

## 2020-10-27 PROCEDURE — 94003 VENT MGMT INPAT SUBQ DAY: CPT

## 2020-10-27 PROCEDURE — 85384 FIBRINOGEN ACTIVITY: CPT | Performed by: STUDENT IN AN ORGANIZED HEALTH CARE EDUCATION/TRAINING PROGRAM

## 2020-10-27 PROCEDURE — C9113 INJ PANTOPRAZOLE SODIUM, VIA: HCPCS | Performed by: STUDENT IN AN ORGANIZED HEALTH CARE EDUCATION/TRAINING PROGRAM

## 2020-10-27 PROCEDURE — 93970 EXTREMITY STUDY: CPT | Mod: XS

## 2020-10-27 PROCEDURE — 999N000015 HC STATISTIC ARTERIAL MONITORING DAILY

## 2020-10-27 PROCEDURE — 83735 ASSAY OF MAGNESIUM: CPT | Performed by: INTERNAL MEDICINE

## 2020-10-27 PROCEDURE — 83615 LACTATE (LD) (LDH) ENZYME: CPT | Performed by: INTERNAL MEDICINE

## 2020-10-27 PROCEDURE — 999N000157 HC STATISTIC RCP TIME EA 10 MIN

## 2020-10-27 PROCEDURE — 999N001086 HC STATISTIC MORPHOLOGY W/INTERP HEMEPATH TC 85060: Performed by: STUDENT IN AN ORGANIZED HEALTH CARE EDUCATION/TRAINING PROGRAM

## 2020-10-27 PROCEDURE — 85045 AUTOMATED RETICULOCYTE COUNT: CPT | Performed by: STUDENT IN AN ORGANIZED HEALTH CARE EDUCATION/TRAINING PROGRAM

## 2020-10-27 RX ADMIN — HEPARIN SODIUM 5000 UNITS: 10000 INJECTION, SOLUTION INTRAVENOUS; SUBCUTANEOUS at 04:27

## 2020-10-27 RX ADMIN — MICONAZOLE NITRATE: 20 POWDER TOPICAL at 19:50

## 2020-10-27 RX ADMIN — CARBAMAZEPINE 150 MG: 100 SUSPENSION ORAL at 11:25

## 2020-10-27 RX ADMIN — POTASSIUM CHLORIDE 20 MEQ: 1.5 POWDER, FOR SOLUTION ORAL at 00:45

## 2020-10-27 RX ADMIN — HUMAN INSULIN 4 UNITS/HR: 100 INJECTION, SOLUTION SUBCUTANEOUS at 17:23

## 2020-10-27 RX ADMIN — DEXMEDETOMIDINE 0.4 MCG/KG/HR: 100 INJECTION, SOLUTION, CONCENTRATE INTRAVENOUS at 01:32

## 2020-10-27 RX ADMIN — CARBAMAZEPINE 150 MG: 100 SUSPENSION ORAL at 00:42

## 2020-10-27 RX ADMIN — PANTOPRAZOLE SODIUM 40 MG: 40 INJECTION, POWDER, FOR SOLUTION INTRAVENOUS at 08:51

## 2020-10-27 RX ADMIN — PIPERACILLIN AND TAZOBACTAM 3.38 G: 3; .375 INJECTION, POWDER, FOR SOLUTION INTRAVENOUS at 08:51

## 2020-10-27 RX ADMIN — LEVOTHYROXINE SODIUM 150 MCG: 0.15 TABLET ORAL at 08:51

## 2020-10-27 RX ADMIN — CALCIUM CARBONATE 1250 MG: 1250 SUSPENSION ORAL at 17:24

## 2020-10-27 RX ADMIN — Medication 50 MCG: at 08:51

## 2020-10-27 RX ADMIN — BRIVARACETAM 100 MG: 10 SOLUTION ORAL at 19:55

## 2020-10-27 RX ADMIN — SODIUM PHOSPHATE, MONOBASIC, MONOHYDRATE AND SODIUM PHOSPHATE, DIBASIC, ANHYDROUS 20 MMOL: 276; 142 INJECTION, SOLUTION INTRAVENOUS at 18:30

## 2020-10-27 RX ADMIN — CALCIUM CARBONATE 1250 MG: 1250 SUSPENSION ORAL at 11:25

## 2020-10-27 RX ADMIN — CARBAMAZEPINE 150 MG: 100 SUSPENSION ORAL at 23:18

## 2020-10-27 RX ADMIN — MULTIVITAMIN 15 ML: LIQUID ORAL at 08:52

## 2020-10-27 RX ADMIN — HYDROCORTISONE SODIUM SUCCINATE 50 MG: 100 INJECTION, POWDER, FOR SOLUTION INTRAMUSCULAR; INTRAVENOUS at 13:21

## 2020-10-27 RX ADMIN — POLYETHYLENE GLYCOL 3350 17 G: 17 POWDER, FOR SOLUTION ORAL at 00:45

## 2020-10-27 RX ADMIN — CARBAMAZEPINE 150 MG: 100 SUSPENSION ORAL at 17:00

## 2020-10-27 RX ADMIN — POTASSIUM CHLORIDE 20 MEQ: 1.5 POWDER, FOR SOLUTION ORAL at 04:27

## 2020-10-27 RX ADMIN — PIPERACILLIN AND TAZOBACTAM 3.38 G: 3; .375 INJECTION, POWDER, FOR SOLUTION INTRAVENOUS at 02:39

## 2020-10-27 RX ADMIN — PIPERACILLIN AND TAZOBACTAM 3.38 G: 3; .375 INJECTION, POWDER, FOR SOLUTION INTRAVENOUS at 19:49

## 2020-10-27 RX ADMIN — MICONAZOLE NITRATE: 20 POWDER TOPICAL at 08:53

## 2020-10-27 RX ADMIN — PIPERACILLIN AND TAZOBACTAM 3.38 G: 3; .375 INJECTION, POWDER, FOR SOLUTION INTRAVENOUS at 13:21

## 2020-10-27 RX ADMIN — HUMAN INSULIN 5.5 UNITS/HR: 100 INJECTION, SOLUTION SUBCUTANEOUS at 04:32

## 2020-10-27 RX ADMIN — METOCLOPRAMIDE HYDROCHLORIDE 10 MG: 5 SOLUTION ORAL at 16:50

## 2020-10-27 RX ADMIN — BRIVARACETAM 100 MG: 10 SOLUTION ORAL at 11:33

## 2020-10-27 RX ADMIN — SODIUM PHOSPHATE, MONOBASIC, MONOHYDRATE AND SODIUM PHOSPHATE, DIBASIC, ANHYDROUS 20 MMOL: 276; 142 INJECTION, SOLUTION INTRAVENOUS at 06:54

## 2020-10-27 RX ADMIN — CALCIUM CARBONATE 1250 MG: 1250 SUSPENSION ORAL at 08:52

## 2020-10-27 RX ADMIN — VANCOMYCIN HYDROCHLORIDE 1500 MG: 10 INJECTION, POWDER, LYOPHILIZED, FOR SOLUTION INTRAVENOUS at 19:03

## 2020-10-27 RX ADMIN — HYDROCORTISONE SODIUM SUCCINATE 50 MG: 100 INJECTION, POWDER, FOR SOLUTION INTRAMUSCULAR; INTRAVENOUS at 08:51

## 2020-10-27 RX ADMIN — METOCLOPRAMIDE HYDROCHLORIDE 10 MG: 5 SOLUTION ORAL at 23:17

## 2020-10-27 RX ADMIN — HYDROCORTISONE SODIUM SUCCINATE 50 MG: 100 INJECTION, POWDER, FOR SOLUTION INTRAMUSCULAR; INTRAVENOUS at 02:44

## 2020-10-27 RX ADMIN — SODIUM BICARBONATE 650 MG TABLET 650 MG: at 08:51

## 2020-10-27 RX ADMIN — SODIUM CHLORIDE 500 ML: 9 INJECTION, SOLUTION INTRAVENOUS at 13:45

## 2020-10-27 RX ADMIN — SODIUM CHLORIDE 500 ML: 9 INJECTION, SOLUTION INTRAVENOUS at 18:41

## 2020-10-27 RX ADMIN — Medication 1 PACKET: at 12:23

## 2020-10-27 RX ADMIN — DEXMEDETOMIDINE 0.2 MCG/KG/HR: 100 INJECTION, SOLUTION, CONCENTRATE INTRAVENOUS at 20:35

## 2020-10-27 RX ADMIN — FLUDROCORTISONE ACETATE 100 MCG: 0.1 TABLET ORAL at 08:51

## 2020-10-27 RX ADMIN — HYDROCORTISONE SODIUM SUCCINATE 50 MG: 100 INJECTION, POWDER, FOR SOLUTION INTRAMUSCULAR; INTRAVENOUS at 19:49

## 2020-10-27 RX ADMIN — CARBAMAZEPINE 150 MG: 100 SUSPENSION ORAL at 05:11

## 2020-10-27 ASSESSMENT — ACTIVITIES OF DAILY LIVING (ADL)
ADLS_ACUITY_SCORE: 40

## 2020-10-27 ASSESSMENT — MIFFLIN-ST. JEOR: SCORE: 1622.13

## 2020-10-27 NOTE — PROGRESS NOTES
[facility]   Patient is currently open to home care services with [facility]. The patient is currently receiving PT services. [facility]  and team have been notified of patient admission. [facility] liaison will continue to follow patient during stay. If appropriate provide orders to resume home care at time of discharge.    Carolyn Charles RN BSN  [facility] Liaison  [phone]

## 2020-10-27 NOTE — PHARMACY-VANCOMYCIN DOSING SERVICE
Pharmacy Vancomycin Initial Note  Date of Service 2020  Patient's  1960  60 year old, male    Indication: Aspiration Pneumonia    Current estimated CrCl = Estimated Creatinine Clearance: 91.6 mL/min (based on SCr of 0.95 mg/dL).    Creatinine for last 3 days  10/25/2020:  7:42 AM Creatinine 3.28 mg/dL;  1:45 PM Creatinine 2.49 mg/dL  10/26/2020:  4:00 AM Creatinine 1.37 mg/dL  10/27/2020:  3:31 AM Creatinine 0.95 mg/dL    Recent Vancomycin Level(s) for last 3 days  10/26/2020:  4:00 AM Vancomycin Level 8.5 mg/L      Vancomycin IV Administrations (past 72 hours)                   vancomycin 1500 mg in 0.9% NaCl 250 ml intermittent infusion 1,500 mg (mg) 1,500 mg Given 10/26/20 0838    vancomycin 1500 mg in 0.9% NaCl 250 ml intermittent infusion 1,500 mg (mg) 1,500 mg Given 10/25/20 0834                Nephrotoxins and other renal medications (From now, onward)    Start     Dose/Rate Route Frequency Ordered Stop    10/27/20 1900  vancomycin 1500 mg in 0.9% NaCl 250 ml intermittent infusion 1,500 mg      1,500 mg  over 90 Minutes Intravenous EVERY 18 HOURS 10/27/20 1756      10/25/20 1400  piperacillin-tazobactam (ZOSYN) 3.375 g vial to attach to  mL bag      3.375 g  over 30 Minutes Intravenous EVERY 6 HOURS 10/25/20 1256      10/25/20 1300  norepinephrine (LEVOPHED) 16 mg in  mL infusion      0.03-0.7 mcg/kg/min × 68.9 kg  1.9-45.2 mL/hr  Intravenous CONTINUOUS 10/25/20 1239      10/25/20 1300  vasopressin 40 units in NS 40 mL (PITRESSIN) infusion      0.5-4 Units/hr  0.5-4 mL/hr  Intravenous CONTINUOUS 10/25/20 1239            Contrast Orders - past 72 hours (72h ago, onward)    None                Plan:  1.  Start vancomycin  1500 mg IV q18h (20 mg/kg).   2.  Goal Trough Level: 15-20 mg/L   3.  Pharmacy will check trough levels as appropriate in 1-3 Days.    4. Serum creatinine levels will be ordered daily for the first week of therapy and at least twice weekly for subsequent weeks.     5. [place] method utilized to dose vancomycin therapy: Method 2

## 2020-10-27 NOTE — PROGRESS NOTES
Major Shift Events: attempted to Pressure support, pt did not tolerate, CMV settings resumed. Phos replaced,     Neuro: unchanged, Precedex at 0.2. Attempted to turn precedex off X2 but becomes agitated with purposeful movement for self extubation.   CV: SR, levo off @1830. 500 ml NS bolus X2   Respi: remains on CMV settings at 30% FiO2   GI/: aaron with better output after boluses, J-Tube @ goal rate, G-tube to gravity drainage.   Skin: continues to be red, powder and barrier cream in place. Mepilex in place.   Gtts: Precedex @ 0.2  Levo @0.02  Insulin Algorithm 4      Plan: Continue to wean precedex & levo as pt tolerates.     For vital signs and complete assessments, please see documentation flowsheets.

## 2020-10-27 NOTE — PROGRESS NOTES
CLINICAL NUTRITION SERVICES - BRIEF NOTE    *Please see full assessment note from 10/26/2020    Followed up per previous RD request to clarify TF issues with pt's mom. RN had called RD yesterday stating that per pt's mom, J-tube clogs often at home.     Spoke with pt's mom (Savannah) over the phone. Per Savannah, pt has been receiving Jevity 1.5 @ 55 ml/hr x 24 hrs at home with 1 packet Neutrasource Fiber daily and Neutraphos TID. Savannah reports frequent flushing, including after all medications and fiber are given. Water flushes about q 3 hrs due to pt's medication schedule. Savannah reported not liking the Jevity, as it is a very thick formula. Explained rationale for current fiber-free formula. Blanka reports the feeding tube only started clogging frequently since August 2020 admission to [facility]. Savannah had to end the conversation when another provider came to speak with her.     ?if a new medication was started at home that could be contributing to the J-tube clogging? Will continue to monitor and follow up for more information with Savannah at a better time.     RD to follow per protocol.    Danyelle Washburn, MS, RD, LD, [facility]  [facility] pager: [phone]  ASCOM: [id number]

## 2020-10-27 NOTE — PLAN OF CARE
Major Shift Events: Neurologically unchanged. Moving left, upper extremity purposefully. The rest withdraw. Not following commands and doesn't keep eyes open long. Sinus rhythm to sinus bradycardia. Weaned off all pressors for a couple hours this am, but on vasopressin 2.4 now. Tolerating current vent settings. Minimal secretions and very uncomfortable with oral cares. Looks like he could have lots of dental carries/ tooth decay. Tube feeds continue at goal. Large BM this am. Lerma putting out dark, tea-colored 10-20mL/hr urine. MD aware. Continues on insulin gtt. K replaced x2 overnight. Next recheck tomorrow am. Phosphorus replacement ordered. Next recheck 2 hours after gtt ends. Mom updated via phone call.    Plan: Continue with current POC.    For vital signs and complete assessments, please see documentation flowsheets.

## 2020-10-27 NOTE — PROGRESS NOTES
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    CHANGES and MAJOR THINGS TODAY:   -Decreasing sedation as able.  -Weaning pressor support as able.  -Decreased UOP: Bladder scan without AUR, Lerma patent, 500cc NS.  -Reassess UOP in PM.  -Thrombocytopenia workup: DIC labs, HIT, holding heparin, DVT US 4 extremities.    PLAN:    Neuro:  # Pain and sedation  Analgesia: Fentanyl 25-50 mcg/hr, PRN 25 mcg pushes > transitioning to precedex.  Sedation: Propofol > transitioning to precedex.  - RASS goal 0/-2.     # TBI c/b spastic hemiplegia and severe dysphagia s/p GJ tube  # Pan-hypopituitarism  -Holding TF.  -Switching PTA steroids to stress dose steroids.     # Seizure disorder  -Continue PTA carbamazepine.  -Continue PTA brivaracetam.     # Acute toxic metabolic encephalopathy 2/2 septic shock, hypoxemia  Lethargic at home. Found febrile and sating in the 70s with increased WOB. Electrolytes with mild hyponatremia and hypocalcemia, BUN elevated but no uremia, BS within normal limits, imaging with evidence of bilateral pneumonia. Noted elevated carbamazepine level. Acid base status with mild metabolic acidosis and associated respiratory alkalosis.  -Following infectious workup.  -Will consider head CT, EEG, LP if encephalopathy is persistent.     Pulmonary:  # Acute hypoxemic respiratory failure 2/2 likely recurrent aspiration pneumonia  Hypoxemia, increased WOB and AMS at home.  Intubated on arrival of OSH. Differential: Likely pneumonia from bilateral infiltrates in dependent areas  (suggests aspiration) and elevated procalcitonin and leukocytosis concerning for developing ARDS , CTPE negative for PE, CT did not suggest atelectasis or mucus plug. Of notice, patient has been admitted 10 times this year for sepsis in the setting of recurrent aspiration pneumonia.  -Antibiotics per below.      -Ventilation Mode: CMV/AC  (Continuous Mandatory Ventilation/ Assist Control)  FiO2 (%): 30 %  Rate Set (breaths/minute): 14 breaths/min  Tidal Volume Set (mL): 450 mL  PEEP (cm H2O): 8 cmH2O  Pressure Support (cm H2O): 7 cmH2O  Oxygen Concentration (%): 30 %  Resp: 14       Cardiovascular:  # Shock  Hypotensive on presentation, persistently tachycardic and obtunded requiring airway protection. Labs with ALMAS likely prerenal.   Likely septic in the setting of bilateral pneumonia.  -Distributive: Likely from sepsis.  -Obstructive: CXR without PNT, negative CT-PE.  -Hypovolemic: Collapsing IVC suggests hypovolemia.  -Cardiogenic: Bedside TTE (performed by Cardiology) with normal EF, no wall motion abnormalities, of notice, collapsing IVC > Formal TTE on 10/26 with dilated IVC.     GI/Nutrition:  # Severe dysphagia s/p GJ tube  # Concern for gastroparesis  Speech evaluation once extubated and mental status allows.  -Resume PTA metoclopramide on 10/27.     # Nutrition  TF resumed, nutrition following.     Renal/Fluids/Electrolytes:  # ALMAS likely prerrenal in the setting of septic shock, improving  Bl Cr: 0.7-0.9  Adm Cr: 3.28; BUN/Cr 15.2  Differential suggesting prerenal with improvement after fluid resuscitation to 2.49. UA with protein and RBC in urine but no blood in dipstick, no leukocyturia.  -Holding from urine studies, will consider if new worsening in Cr.     # Mild hypocalcemia, improved  -S/P 2gr Ca gluconate.    # Mild hyponatremia, improved  In the setting of hypovolemia.     Endocrine:  #  Central Hypothyroidism  -Continue PTA levothyroxine.     # Central adrenal insufficiency  On hydrocortisone 20 mg AM and 10 in PM, switched to stress dose steroids 50mg q6H.     ID:  # Septic shock likely from aspiration pneumonia  -Fluid resuscitation: S/P 3L NS at OSH, and 3L LR on admission, adding 1L today.  -Source control: If course remains critical, may require bronchoscopy.  -Pressor support: On NE, , epinephrine, goal MAP >65.  -Steroid support: S/P hydrocortisone 100mg at OSH, continue 60mg q6H.  -Lactate trend: 7.7 > 5.9 > 8.8 > 9.5.  -Micro studies: BC NGTD, UA (no WBC), UC NGTD,Sputum culture/gram, procalcitonin 25, (+) MRSA nares  -Antimicrobials: Vancomycin and zosyn.  -Prior micro: Staph hominis R to methicillin.     Hematology:    # Leukocytosis, improving  Likely in the setting of sepsis.     # Thrombocytopenia  Acute drop 220 (on admission) > 94 (10/27). Potentially in the setting of consumption for sepsis. 4T score is 4.  -HIT concern: Holding heparin, sent HIT panel, 4 ext DVT US (negative).  -DIC labs: fibrinogen, smear, LDH.    Musculoskeletal:  # No acute issues     Skin:  # Pressure injury on coccys, right mid back and right posterior groin  -WOC following, no debridement recommended at this point.     General Cares/Prophylaxis:    DVT Prophylaxis: Heparin SQ  GI Prophylaxis: PPI  Restraints: None.  Family Communication: Updated in person.  Code Status: Full code per prior code status, unable to discuss with patient.     Lines/tubes/drains:  - Glucose: Medium ISS.  - Head: 30 degree elevation.  - Lines: R femoral CVC, L femoral A line.     Disposition:  - Medical ICU.     Patient seen and findings/plan discussed with medical ICU staff, Dr. Carrillo.     Abilio Fine MD  Internal Medicine Resident PG-Y2  MICU service  Pager: [phone]  ====================================  INTERVAL HISTORY:   No acute events overnight. Decreasing pressor requirements overnight. Afebrile.  Adequate UOP. BM overnight. Decreasing UOP through the morning and day.    OBJECTIVE:   1. VITAL SIGNS:   Temp:  [95.9  F (35.5  C)-97.7  F (36.5  C)] 97.3  F (36.3  C)  Pulse:  [55-85] 67  Resp:  [14-18] 14  BP: ()/(57-58) 93/57  MAP:  [53 mmHg-88 mmHg] 75 mmHg  Arterial Line BP: ()/(38-64) 105/55  FiO2 (%):  [30 %] 30 %  SpO2:  [95 %-100 %] 99 %  Ventilation Mode: CMV/AC  (Continuous Mandatory Ventilation/ Assist Control)  FiO2 (%): 30 %  Rate Set (breaths/minute): 14 breaths/min  Tidal Volume Set (mL): 450 mL  PEEP (cm H2O): 8 cmH2O  Pressure Support (cm H2O): 7 cmH2O  Oxygen Concentration (%): 30 %  Resp: 14    2. INTAKE/ OUTPUT:   I/O last 3 completed shifts:  In: 4033.82 [I.V.:1893.82; NG/GT:430; IV Piggyback:1000]  Out: 757 [Urine:667; Emesis/NG output:90]    3. PHYSICAL EXAMINATION:  General: RASS -2/-3, NAD.  HEENT: NC, AT, PERRLA, EOMI.  Neuro: Sedated, moves extremities spontaneously to pain, open eyes to continuous stimulation.  Pulm/Resp: Clear breath sounds bilaterally without rhonchi, crackles or wheeze, breathing non-labored, synchronous to ventilator.  CV: RRR, no rubs/murmurs/gallops.  Abdomen: Soft, non-distended, non-tender.  : Lerma catheter in place, urine yellow and dark, femoral lines clean and dressed.    4. LABS:   Arterial Blood Gases   Recent Labs   Lab 10/26/20  1130 10/26/20  0404 10/25/20  2100 10/25/20  1645   PH 7.41 7.48* 7.30* 7.30*   PCO2 35 29* 32* 31*   PO2 110* 98 134* 149*   HCO3 22 22 16* 15*     Complete Blood Count   Recent Labs   Lab 10/27/20  1410 10/27/20  0331 10/26/20  0400 10/25/20  1345   WBC 8.6 7.8 17.0* 27.2*   HGB 8.1* 8.2* 9.8* 11.7*   PLT 94* 95* 147* 224     Basic Metabolic Panel  Recent Labs   Lab 10/27/20  0331 10/26/20  2227 10/26/20  1130 10/26/20  0400 10/25/20  1345 10/25/20  1345 10/25/20  0742     --   --  136  --  135 132*   POTASSIUM 3.9 3.8 3.3* 3.3*   < > 4.1 3.5   CHLORIDE 110*  --   --  106  --  102 96   CO2 23  --   --  21   --  16* 22   BUN 20  --   --  24  --  41* 50*   CR 0.95  --   --  1.37*  --  2.49* 3.28*   *  --   --  208*  --  219* 106*    < > = values in this interval not displayed.     Liver Function Tests  Recent Labs   Lab 10/27/20  0331 10/26/20  0400 10/25/20  1345 10/25/20  0742   AST 52* 52* 42 27   ALT 28 24 15 15   ALKPHOS 52 66 83 72   BILITOTAL 0.4 0.7 1.2 0.9   ALBUMIN 1.8* 1.8* 2.2* 2.1*   INR  --  1.82* 1.86* 1.89*     Coagulation Profile  Recent Labs   Lab 10/26/20  0400 10/25/20  1345 10/25/20  0742   INR 1.82* 1.86* 1.89*   PTT  --  38*  --        5. RADIOLOGY:   Recent Results (from the past 24 hour(s))   US Upper Extremity Venous Duplex Bilat    Narrative    EXAM: DOPPLER VENOUS ULTRASOUND OF BILATERAL UPPER EXTREMITIES,  10/27/2020 11:38 AM     HISTORY: drop in platelets.    COMPARISON: Ultrasound 12/2/2016.    TECHNIQUE:  Gray-scale evaluation with compression, spectral flow, and  color Doppler assessment of the deep venous system of both upper  extremities.    FINDINGS:  Normal blood flow and waveforms are demonstrated in the internal  jugular, innominate, subclavian, and axillary veins bilaterally. There  is normal compressibility of the brachial, basilic and cephalic veins  bilaterally.      Impression    IMPRESSION:  No evidence of deep venous thrombosis in either upper extremity.    I have personally reviewed the examination and initial interpretation  and I agree with the findings.    ADRI RANGEL MD   US Lower Extremity Venous Bilateral Port    Narrative    EXAM: DOPPLER VENOUS ULTRASOUND OF BILATERAL LOWER EXTREMITIES,  10/27/2020 11:39 AM.    HISTORY: drop in platelets.    COMPARISON:  Ultrasound 11/26/2016.    TECHNIQUE:  Gray-scale evaluation with compression, spectral flow and  color Doppler assessment of the deep venous system of both legs from  groin to knee, and then at the ankles.    FINDINGS:  In both lower extremities, the common femoral, femoral, popliteal and  posterior tibial  veins demonstrate normal compressibility and blood  flow.       Impression    IMPRESSION:  No evidence of deep venous thrombosis in either lower extremity.    I have personally reviewed the examination and initial interpretation  and I agree with the findings.    ADRI RANGEL MD

## 2020-10-27 NOTE — ANESTHESIA PROCEDURE NOTES
Airway   Date/Time: 10/26/2020 10:58 PM   Patient location during procedure: ICU    Staff -   Performed By: other anesthesia staff  Referred By: GLADYS Samuels     Consent for Airway   Urgency: emergentConsent: The procedure was performed in an emergent situation.    Report Obtained from Primary Care Team  History regarding most recent potassium obtained: Yes  History regarding presence/absence of renal failure obtained:Yes  History regarding stroke/CVA obtained:Yes  History regarding presence/absence of NM disorder:Yes    Indications and Patient Condition  Indications for airway management: altered level of consciousness and airway protection  Mallampati: Not Assessed (on Bipap)Induction type:intravenousMask difficulty assessment: 0 - not attempted    Final Airway Details  Final airway type: endotracheal airway  Successful airway:ETT - single  Endotracheal Airway Details   ETT size (mm): 8.0  Successful intubation technique: direct laryngoscopy and video laryngoscopy  Grade View of Cords: 1  Adjucts: stylet  Measured from: gums/teeth  Secured at (cm): 24  Secured with: commercial tube lundberg  Bite block used: None    Post intubation assessment   ETT secured, Vent settings by primary/ICU team, Primary/ICU team to review CXR, Sedation to be ordered by primary/ICU team and No apparent complications  Placement verified by: capnometry, equal breath sounds and chest rise   Number of attempts at approach: 1  Secured with:commercial tube lundberg  Ease of procedure: easy  Dentition: Intact and Unchanged    Medications Administered  Propofol (DIPRIVAN) injection 10 mg/mL vial, 50 mg  rocuronium (ZEMURON) 10 mg/mL injection, 100 mg  Additional Comments  Pt. Transferred to ICU from 6B, hx etoh, decreased LOC, emesis x 1.  Uneventful induction & intubation, VSS, and Propofol for sedation in the room ready to start.
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
## 2020-10-28 LAB
ALBUMIN SERPL-MCNC: 1.8 G/DL (ref 3.4–5)
ALBUMIN UR-MCNC: NEGATIVE MG/DL
ALP SERPL-CCNC: 73 U/L (ref 40–150)
ALT SERPL W P-5'-P-CCNC: 32 U/L (ref 0–70)
ANION GAP SERPL CALCULATED.3IONS-SCNC: 5 MMOL/L (ref 3–14)
APPEARANCE UR: CLEAR
AST SERPL W P-5'-P-CCNC: 48 U/L (ref 0–45)
BASOPHILS # BLD AUTO: 0 10E9/L (ref 0–0.2)
BASOPHILS NFR BLD AUTO: 0 %
BILIRUB SERPL-MCNC: 0.4 MG/DL (ref 0.2–1.3)
BILIRUB UR QL STRIP: NEGATIVE
BUN SERPL-MCNC: 18 MG/DL (ref 7–30)
CALCIUM SERPL-MCNC: 7.5 MG/DL (ref 8.5–10.1)
CHLORIDE SERPL-SCNC: 117 MMOL/L (ref 94–109)
CO2 SERPL-SCNC: 25 MMOL/L (ref 20–32)
COLOR UR AUTO: ABNORMAL
COPATH REPORT: NORMAL
CREAT SERPL-MCNC: 0.82 MG/DL (ref 0.66–1.25)
DIFFERENTIAL METHOD BLD: ABNORMAL
EOSINOPHIL # BLD AUTO: 0 10E9/L (ref 0–0.7)
EOSINOPHIL NFR BLD AUTO: 0 %
ERYTHROCYTE [DISTWIDTH] IN BLOOD BY AUTOMATED COUNT: 15.1 % (ref 10–15)
GFR SERPL CREATININE-BSD FRML MDRD: >90 ML/MIN/{1.73_M2}
GLUCOSE BLDC GLUCOMTR-MCNC: 101 MG/DL (ref 70–99)
GLUCOSE BLDC GLUCOMTR-MCNC: 101 MG/DL (ref 70–99)
GLUCOSE BLDC GLUCOMTR-MCNC: 109 MG/DL (ref 70–99)
GLUCOSE BLDC GLUCOMTR-MCNC: 110 MG/DL (ref 70–99)
GLUCOSE BLDC GLUCOMTR-MCNC: 111 MG/DL (ref 70–99)
GLUCOSE BLDC GLUCOMTR-MCNC: 114 MG/DL (ref 70–99)
GLUCOSE BLDC GLUCOMTR-MCNC: 121 MG/DL (ref 70–99)
GLUCOSE BLDC GLUCOMTR-MCNC: 126 MG/DL (ref 70–99)
GLUCOSE BLDC GLUCOMTR-MCNC: 139 MG/DL (ref 70–99)
GLUCOSE BLDC GLUCOMTR-MCNC: 88 MG/DL (ref 70–99)
GLUCOSE SERPL-MCNC: 117 MG/DL (ref 70–99)
GLUCOSE UR STRIP-MCNC: NEGATIVE MG/DL
HCT VFR BLD AUTO: 27 % (ref 40–53)
HGB BLD-MCNC: 8.6 G/DL (ref 13.3–17.7)
HGB UR QL STRIP: ABNORMAL
IMM GRANULOCYTES # BLD: 0.1 10E9/L (ref 0–0.4)
IMM GRANULOCYTES NFR BLD: 1 %
KETONES UR STRIP-MCNC: NEGATIVE MG/DL
LACTATE BLD-SCNC: 2.4 MMOL/L (ref 0.7–2)
LEUKOCYTE ESTERASE UR QL STRIP: NEGATIVE
LYMPHOCYTES # BLD AUTO: 1 10E9/L (ref 0.8–5.3)
LYMPHOCYTES NFR BLD AUTO: 12.3 %
MAGNESIUM SERPL-MCNC: 2.1 MG/DL (ref 1.6–2.3)
MAGNESIUM SERPL-MCNC: 2.5 MG/DL (ref 1.6–2.3)
MCH RBC QN AUTO: 29.3 PG (ref 26.5–33)
MCHC RBC AUTO-ENTMCNC: 31.9 G/DL (ref 31.5–36.5)
MCV RBC AUTO: 92 FL (ref 78–100)
MONOCYTES # BLD AUTO: 0.4 10E9/L (ref 0–1.3)
MONOCYTES NFR BLD AUTO: 5.5 %
NEUTROPHILS # BLD AUTO: 6.3 10E9/L (ref 1.6–8.3)
NEUTROPHILS NFR BLD AUTO: 81.2 %
NITRATE UR QL: NEGATIVE
NRBC # BLD AUTO: 0 10*3/UL
NRBC BLD AUTO-RTO: 0 /100
OSMOLALITY SERPL: 312 MMOL/KG (ref 275–295)
OSMOLALITY UR: 178 MMOL/KG (ref 100–1200)
PH UR STRIP: 7 PH (ref 5–7)
PHOSPHATE SERPL-MCNC: 1.9 MG/DL (ref 2.5–4.5)
PHOSPHATE SERPL-MCNC: 3.4 MG/DL (ref 2.5–4.5)
PLATELET # BLD AUTO: 110 10E9/L (ref 150–450)
POTASSIUM SERPL-SCNC: 3.8 MMOL/L (ref 3.4–5.3)
POTASSIUM SERPL-SCNC: 3.8 MMOL/L (ref 3.4–5.3)
PROT SERPL-MCNC: 5.5 G/DL (ref 6.8–8.8)
RBC # BLD AUTO: 2.94 10E12/L (ref 4.4–5.9)
RBC #/AREA URNS AUTO: 39 /HPF (ref 0–2)
SODIUM BLD-SCNC: 168 MMOL/L (ref 133–144)
SODIUM SERPL-SCNC: 147 MMOL/L (ref 133–144)
SODIUM SERPL-SCNC: 165 MMOL/L (ref 133–144)
SODIUM SERPL-SCNC: 167 MMOL/L (ref 133–144)
SODIUM UR-SCNC: 68 MMOL/L
SOURCE: ABNORMAL
SP GR UR STRIP: 1 (ref 1–1.03)
UROBILINOGEN UR STRIP-MCNC: NORMAL MG/DL (ref 0–2)
WBC # BLD AUTO: 7.8 10E9/L (ref 4–11)
WBC #/AREA URNS AUTO: 1 /HPF (ref 0–5)

## 2020-10-28 PROCEDURE — 250N000011 HC RX IP 250 OP 636: Performed by: STUDENT IN AN ORGANIZED HEALTH CARE EDUCATION/TRAINING PROGRAM

## 2020-10-28 PROCEDURE — 94003 VENT MGMT INPAT SUBQ DAY: CPT

## 2020-10-28 PROCEDURE — 80053 COMPREHEN METABOLIC PANEL: CPT | Performed by: INTERNAL MEDICINE

## 2020-10-28 PROCEDURE — 84132 ASSAY OF SERUM POTASSIUM: CPT | Performed by: STUDENT IN AN ORGANIZED HEALTH CARE EDUCATION/TRAINING PROGRAM

## 2020-10-28 PROCEDURE — 999N000015 HC STATISTIC ARTERIAL MONITORING DAILY

## 2020-10-28 PROCEDURE — 250N000013 HC RX MED GY IP 250 OP 250 PS 637: Performed by: STUDENT IN AN ORGANIZED HEALTH CARE EDUCATION/TRAINING PROGRAM

## 2020-10-28 PROCEDURE — 83735 ASSAY OF MAGNESIUM: CPT | Performed by: INTERNAL MEDICINE

## 2020-10-28 PROCEDURE — 84295 ASSAY OF SERUM SODIUM: CPT | Performed by: STUDENT IN AN ORGANIZED HEALTH CARE EDUCATION/TRAINING PROGRAM

## 2020-10-28 PROCEDURE — C9113 INJ PANTOPRAZOLE SODIUM, VIA: HCPCS | Performed by: STUDENT IN AN ORGANIZED HEALTH CARE EDUCATION/TRAINING PROGRAM

## 2020-10-28 PROCEDURE — 99291 CRITICAL CARE FIRST HOUR: CPT | Mod: GC | Performed by: INTERNAL MEDICINE

## 2020-10-28 PROCEDURE — 258N000003 HC RX IP 258 OP 636: Performed by: INTERNAL MEDICINE

## 2020-10-28 PROCEDURE — 999N000128 HC STATISTIC PERIPHERAL IV START W/O US GUIDANCE

## 2020-10-28 PROCEDURE — 84300 ASSAY OF URINE SODIUM: CPT | Performed by: NURSE PRACTITIONER

## 2020-10-28 PROCEDURE — 84100 ASSAY OF PHOSPHORUS: CPT | Performed by: INTERNAL MEDICINE

## 2020-10-28 PROCEDURE — 999N000157 HC STATISTIC RCP TIME EA 10 MIN

## 2020-10-28 PROCEDURE — 200N000002 HC R&B ICU UMMC

## 2020-10-28 PROCEDURE — 250N000009 HC RX 250: Performed by: STUDENT IN AN ORGANIZED HEALTH CARE EDUCATION/TRAINING PROGRAM

## 2020-10-28 PROCEDURE — 250N000013 HC RX MED GY IP 250 OP 250 PS 637: Performed by: INTERNAL MEDICINE

## 2020-10-28 PROCEDURE — 36415 COLL VENOUS BLD VENIPUNCTURE: CPT | Performed by: STUDENT IN AN ORGANIZED HEALTH CARE EDUCATION/TRAINING PROGRAM

## 2020-10-28 PROCEDURE — 81001 URINALYSIS AUTO W/SCOPE: CPT | Performed by: NURSE PRACTITIONER

## 2020-10-28 PROCEDURE — 83930 ASSAY OF BLOOD OSMOLALITY: CPT | Performed by: INTERNAL MEDICINE

## 2020-10-28 PROCEDURE — 250N000012 HC RX MED GY IP 250 OP 636 PS 637: Performed by: NURSE PRACTITIONER

## 2020-10-28 PROCEDURE — 250N000011 HC RX IP 250 OP 636: Performed by: INTERNAL MEDICINE

## 2020-10-28 PROCEDURE — 258N000001 HC RX 258: Performed by: STUDENT IN AN ORGANIZED HEALTH CARE EDUCATION/TRAINING PROGRAM

## 2020-10-28 PROCEDURE — 83935 ASSAY OF URINE OSMOLALITY: CPT | Performed by: NURSE PRACTITIONER

## 2020-10-28 PROCEDURE — 258N000003 HC RX IP 258 OP 636: Performed by: STUDENT IN AN ORGANIZED HEALTH CARE EDUCATION/TRAINING PROGRAM

## 2020-10-28 PROCEDURE — 84295 ASSAY OF SERUM SODIUM: CPT | Performed by: INTERNAL MEDICINE

## 2020-10-28 PROCEDURE — 83605 ASSAY OF LACTIC ACID: CPT | Performed by: NURSE PRACTITIONER

## 2020-10-28 PROCEDURE — 36415 COLL VENOUS BLD VENIPUNCTURE: CPT | Performed by: NURSE PRACTITIONER

## 2020-10-28 PROCEDURE — 84132 ASSAY OF SERUM POTASSIUM: CPT | Performed by: INTERNAL MEDICINE

## 2020-10-28 PROCEDURE — 999N001017 HC STATISTIC GLUCOSE BY METER IP

## 2020-10-28 PROCEDURE — 272N000078 HC NUTRITION PRODUCT INTERMEDIATE LITER

## 2020-10-28 PROCEDURE — 85025 COMPLETE CBC W/AUTO DIFF WBC: CPT | Performed by: INTERNAL MEDICINE

## 2020-10-28 RX ORDER — DEXTROSE MONOHYDRATE 25 G/50ML
25-50 INJECTION, SOLUTION INTRAVENOUS
Status: DISCONTINUED | OUTPATIENT
Start: 2020-10-28 | End: 2020-11-06 | Stop reason: HOSPADM

## 2020-10-28 RX ORDER — ACETAMINOPHEN 325 MG/10.15ML
650 LIQUID ORAL EVERY 4 HOURS PRN
Status: DISCONTINUED | OUTPATIENT
Start: 2020-10-28 | End: 2020-11-06 | Stop reason: HOSPADM

## 2020-10-28 RX ORDER — NICOTINE POLACRILEX 4 MG
15-30 LOZENGE BUCCAL
Status: DISCONTINUED | OUTPATIENT
Start: 2020-10-28 | End: 2020-11-06 | Stop reason: HOSPADM

## 2020-10-28 RX ORDER — DEXTROSE MONOHYDRATE 50 MG/ML
INJECTION, SOLUTION INTRAVENOUS CONTINUOUS
Status: DISCONTINUED | OUTPATIENT
Start: 2020-10-28 | End: 2020-10-28

## 2020-10-28 RX ADMIN — CARBAMAZEPINE 150 MG: 100 SUSPENSION ORAL at 05:49

## 2020-10-28 RX ADMIN — CALCIUM CARBONATE 1250 MG: 1250 SUSPENSION ORAL at 17:44

## 2020-10-28 RX ADMIN — POTASSIUM & SODIUM PHOSPHATES POWDER PACK 280-160-250 MG 1 PACKET: 280-160-250 PACK at 11:04

## 2020-10-28 RX ADMIN — INSULIN GLARGINE 20 UNITS: 100 INJECTION, SOLUTION SUBCUTANEOUS at 13:10

## 2020-10-28 RX ADMIN — BRIVARACETAM 100 MG: 10 SOLUTION ORAL at 11:04

## 2020-10-28 RX ADMIN — POTASSIUM & SODIUM PHOSPHATES POWDER PACK 280-160-250 MG 1 PACKET: 280-160-250 PACK at 15:48

## 2020-10-28 RX ADMIN — CARBAMAZEPINE 150 MG: 100 SUSPENSION ORAL at 17:44

## 2020-10-28 RX ADMIN — MULTIVITAMIN 15 ML: LIQUID ORAL at 08:15

## 2020-10-28 RX ADMIN — SODIUM BICARBONATE 650 MG TABLET 650 MG: at 08:15

## 2020-10-28 RX ADMIN — HYDROCORTISONE SODIUM SUCCINATE 50 MG: 100 INJECTION, POWDER, FOR SOLUTION INTRAMUSCULAR; INTRAVENOUS at 13:14

## 2020-10-28 RX ADMIN — FLUDROCORTISONE ACETATE 100 MCG: 0.1 TABLET ORAL at 08:15

## 2020-10-28 RX ADMIN — LEVOTHYROXINE SODIUM 150 MCG: 0.15 TABLET ORAL at 08:15

## 2020-10-28 RX ADMIN — BRIVARACETAM 100 MG: 10 SOLUTION ORAL at 21:48

## 2020-10-28 RX ADMIN — HYDROCORTISONE SODIUM SUCCINATE 50 MG: 100 INJECTION, POWDER, FOR SOLUTION INTRAMUSCULAR; INTRAVENOUS at 02:08

## 2020-10-28 RX ADMIN — METOCLOPRAMIDE HYDROCHLORIDE 10 MG: 5 SOLUTION ORAL at 08:17

## 2020-10-28 RX ADMIN — Medication 50 MCG: at 08:15

## 2020-10-28 RX ADMIN — DEXTROSE AND SODIUM CHLORIDE: 5; 450 INJECTION, SOLUTION INTRAVENOUS at 21:43

## 2020-10-28 RX ADMIN — PIPERACILLIN AND TAZOBACTAM 3.38 G: 3; .375 INJECTION, POWDER, FOR SOLUTION INTRAVENOUS at 20:03

## 2020-10-28 RX ADMIN — POTASSIUM CHLORIDE 20 MEQ: 1.5 POWDER, FOR SOLUTION ORAL at 05:49

## 2020-10-28 RX ADMIN — CALCIUM CARBONATE 1250 MG: 1250 SUSPENSION ORAL at 08:17

## 2020-10-28 RX ADMIN — POTASSIUM & SODIUM PHOSPHATES POWDER PACK 280-160-250 MG 1 PACKET: 280-160-250 PACK at 20:03

## 2020-10-28 RX ADMIN — METOCLOPRAMIDE HYDROCHLORIDE 10 MG: 5 SOLUTION ORAL at 11:05

## 2020-10-28 RX ADMIN — ENOXAPARIN SODIUM 40 MG: 40 INJECTION SUBCUTANEOUS at 05:49

## 2020-10-28 RX ADMIN — MICONAZOLE NITRATE: 20 POWDER TOPICAL at 20:04

## 2020-10-28 RX ADMIN — CALCIUM CARBONATE 1250 MG: 1250 SUSPENSION ORAL at 12:44

## 2020-10-28 RX ADMIN — PIPERACILLIN AND TAZOBACTAM 3.38 G: 3; .375 INJECTION, POWDER, FOR SOLUTION INTRAVENOUS at 13:14

## 2020-10-28 RX ADMIN — POTASSIUM & SODIUM PHOSPHATES POWDER PACK 280-160-250 MG 1 PACKET: 280-160-250 PACK at 13:10

## 2020-10-28 RX ADMIN — PIPERACILLIN AND TAZOBACTAM 3.38 G: 3; .375 INJECTION, POWDER, FOR SOLUTION INTRAVENOUS at 08:15

## 2020-10-28 RX ADMIN — PIPERACILLIN AND TAZOBACTAM 3.38 G: 3; .375 INJECTION, POWDER, FOR SOLUTION INTRAVENOUS at 02:08

## 2020-10-28 RX ADMIN — DESMOPRESSIN ACETATE 24 MCG: 4 SOLUTION INTRAVENOUS at 15:41

## 2020-10-28 RX ADMIN — DEXTROSE AND SODIUM CHLORIDE: 5; 450 INJECTION, SOLUTION INTRAVENOUS at 17:43

## 2020-10-28 RX ADMIN — VANCOMYCIN HYDROCHLORIDE 1500 MG: 10 INJECTION, POWDER, LYOPHILIZED, FOR SOLUTION INTRAVENOUS at 13:10

## 2020-10-28 RX ADMIN — HYDROCORTISONE SODIUM SUCCINATE 50 MG: 100 INJECTION, POWDER, FOR SOLUTION INTRAMUSCULAR; INTRAVENOUS at 08:16

## 2020-10-28 RX ADMIN — METOCLOPRAMIDE HYDROCHLORIDE 10 MG: 5 SOLUTION ORAL at 21:48

## 2020-10-28 RX ADMIN — CARBAMAZEPINE 150 MG: 100 SUSPENSION ORAL at 12:44

## 2020-10-28 RX ADMIN — MICONAZOLE NITRATE: 20 POWDER TOPICAL at 08:18

## 2020-10-28 RX ADMIN — SODIUM PHOSPHATE, MONOBASIC, MONOHYDRATE AND SODIUM PHOSPHATE, DIBASIC, ANHYDROUS 20 MMOL: 276; 142 INJECTION, SOLUTION INTRAVENOUS at 08:15

## 2020-10-28 RX ADMIN — PANTOPRAZOLE SODIUM 40 MG: 40 INJECTION, POWDER, FOR SOLUTION INTRAVENOUS at 08:15

## 2020-10-28 RX ADMIN — METOCLOPRAMIDE HYDROCHLORIDE 10 MG: 5 SOLUTION ORAL at 15:48

## 2020-10-28 RX ADMIN — HYDROCORTISONE SODIUM SUCCINATE 50 MG: 100 INJECTION, POWDER, FOR SOLUTION INTRAMUSCULAR; INTRAVENOUS at 20:03

## 2020-10-28 ASSESSMENT — ACTIVITIES OF DAILY LIVING (ADL)
ADLS_ACUITY_SCORE: 40

## 2020-10-28 ASSESSMENT — MIFFLIN-ST. JEOR: SCORE: 1642.13

## 2020-10-28 NOTE — PROGRESS NOTES
Care Management Follow Up Note    Length of Stay (days) 2    Patient plan of care discussed at Interdisciplinary Rounds: yes  Expected Discharge Date:    Concerns to be Addressed: [facility] report       Anticipated Discharge Disposition:  TBD  Anticipated Discharge Services:  TBD  Anticipated Discharge DME:  TBD     Plan:  SW received VM from [facility] Felisa Guzman ([phone]) regarding VA report submitted by CHE on 10/27. SW attempted to return call and left VM to the [facility] SW. Per chart review, CHE filed VA report [id number] out of concern that even with extensive in-home services, patient continues to have repeated hospitalizations.     SW received call from [facility]. Pt is receiving 12 hours of nursing services and a live in PCA with providers that have been working with Pt for an extended period. Felisa mentioned that documentation shows that Pt is getting quality care at home and has declined medically this year.    Felisa mentioned to let Pt legal Guardian (mother) know that living in a facility would be the best option of Pt due to repeated hospitalizations despite extensive CADI waiver services. Felisa reported that the guardian should know that the SW is a mandated  and must report based on repeated hospitalizations. SW will continue to provide psychosocial support, resources and advocate on behalf of Pt.        GILMER Edward, CHE  ICU    [facility]  Phone: [phone]  Pager: [phone]  NIRAV Edward

## 2020-10-28 NOTE — PLAN OF CARE
Major Shift Events: Afebrile, alert/awake, not following commands, irregular pupill shape, sluggish, LUE has more spontaneous movement, extubated this afternoon, on HFNC 40L, 60%, noted to be a mouth breather, weaning o2 as tolerated, polyuric, (see flowsheet), Na+-167, desmopressin IV given, D% 1/2 NS infusing at 270ml/hr, recheck pending, TFs stopped during rounds this am, followed up with team, recommended to remain NPO until tomorrow am.      Plan: Continue with POC    See vital signs and flowsheets.

## 2020-10-28 NOTE — PROGRESS NOTES
[facility], [place]  Procedure Note          Extubation:       Keyon Farias  MRN# [id number]   :[id number]         Patient extubated at: 1400   Supplemental Oxygen: Via HFNC 40L 100%   Cough: The cough is strong   Secretion Mode: Inline and oral   Secretion Amount: small amount, thin and clear in color   Respiratory Exam:: Breath sounds: coarse     Location: bilateral chest   Skin Exam:: Patient color: natural   Patient Status: Stable   Arterial Blood Gasses: NA         Recorded by Brenda Mon, JULIAN

## 2020-10-28 NOTE — PROGRESS NOTES
Update     We were called by Mr. Farias's nurse with a critical Na value of 167 x2. He has been polyuric today with 7 L of UOP.  We administered ddavp 24mcg +  D5 1/2 N/S @ 270 ml/L. Nephrology not officially consulted but curbsided. Low threshold to page with questions.      Urine Na, Urine Osm, Serum sodium, Serum Osm pending.      Care of patient discussed with Dr. Carrillo.     Khanh Neumann MD  Internal Medicine Resident (PGY1)  [id number]

## 2020-10-28 NOTE — PROGRESS NOTES
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    CHANGES and MAJOR THINGS TODAY:   PST and hopeful extubation  Mobilize and work with therapies  Transition insulin drip to subcutaneous   Remove femoral lines after PIV access established    PLAN:    Neuro:-   # Pain and sedation    Continue low dose precedex    PRN tylenol for pain     # TBI c/b spastic hemiplegia and severe dysphagia s/p GJ tube  # Pan-hypopituitarism    Continue stress dose steroids today and consider returning to PTA dose after exubation     # Seizure disorder    Continue PTA carbamazepine.    Continue PTA brivaracetam.     # Acute toxic metabolic encephalopathy 2/2 septic shock, hypoxemia, improving  - Lethargic at home.  - Found febrile and sating in the 70s with increased WOB.   - Electrolytes with mild hyponatremia and hypocalcemia, BUN elevated but no uremia, BS within normal limits  - Imaging with evidence of bilateral pneumonia.   - Noted elevated carbamazepine level.   - Acid base status with mild metabolic acidosis and associated respiratory alkalosis  - Appears more at baseline today    Following infectious workup.     Pulmonary:  # Acute hypoxemic respiratory failure 2/2 likely recurrent aspiration pneumonia  - Hypoxemia, increased WOB and AMS at home.   - Intubated on arrival of OSH.   - Differential: Likely pneumonia from bilateral infiltrates in dependent areas  (suggests aspiration) and elevated  procalcitonin and leukocytosis concerning for developing ARDS , CTPE negative for PE, CT did not suggest atelectasis or mucus plug.   - Of note, patient has been admitted 10 times this year for sepsis in the setting of recurrent aspiration pneumonia.    Antibiotics per below    Current vent settings:  -Ventilation Mode: CMV/AC  (Continuous Mandatory Ventilation/ Assist Control)  FiO2 (%): 30 %  Rate Set (breaths/minute): 14 breaths/min  Tidal Volume Set (mL): 450 mL  PEEP (cm H2O): 8 cmH2O  Oxygen Concentration (%): 30 %  Resp: 16    PST and hopeful extubation today     Cardiovascular:  # Shock, resolved  - Hypotensive on presentation, persistently tachycardic and obtunded requiring airway protection. Labs with ALMAS likely prerenal.   Likely septic in the setting of bilateral pneumonia.  -Distributive: Likely from sepsis.  -Obstructive: CXR without PNT, negative CT-PE.  -Hypovolemic: Collapsing IVC suggests hypovolemia.  -Cardiogenic: Bedside TTE (performed by Cardiology) with normal EF, no wall motion abnormalities, of notice, collapsing IVC > Formal TTE on 10/26 with dilated IVC.  - Off of pressor for past 24 hours    Goal MAP >65    GI/Nutrition:  # Severe dysphagia s/p GJ tube  # Concern for gastroparesis  - Recurrent aspiration as noted above    Speech evaluation once extubated and mental status allows.    Resumed PTA metoclopramide on 10/27    Hold tube feedings prior to extubation, resume once stable after extubation     # Nutrition    TF resumed, nutrition following     Renal/Fluids/Electrolytes:  # ALMAS likely prerrenal in the setting of septic shock, resolved  Bl Cr: 0.7-0.9  Adm Cr: 3.28; BUN/Cr 15.2  - Differential suggesting prerenal with improvement after fluid resuscitation to 2.49.   - UA with protein and RBC in urine but no blood in dipstick, no leukocyturia.  - Now auto-diuresing overnight    Avoid nephrotoxins    Strict I/O, daily weights    Monitor    BMP in AM     # Mild hypocalcemia, improved  -  S/P 2gr Ca gluconate.    # Mild hyponatremia, resolved  In the setting of hypovolemia.     # Hypernatremia  - iatrogenic    Endocrine:  # Central Hypothyroidism    Continue PTA levothyroxine.     # Central adrenal insufficiency  - On PTA hydrocortisone 20 mg AM and 10 in PM    Currently on stress dose steroids 50mg q6H, continue for today    # Stress and steroid induced hyperglycemia  - Has been on insulin drip    Will transition to lantus 20 U daily and and moderate sliding scale insulin today    ID:  # Septic shock likely from aspiration pneumonia  -Fluid resuscitation: S/P 3L NS at OSH, and 3L LR on admission, adding 1L today.  -Source control: If course remains critical, may require bronchoscopy  -Pressor support: On NE, , epinephrine, goal MAP >65.  -Steroid support: S/P hydrocortisone 100mg at OSH, continue 60mg q6H.  -Micro studies: BC NGTD, UA (no WBC), UC NGTD, (+) MRSA nares  Sputum 10/25 positive for Klebsiella, Pseudomonas and MRSA; MRSA PCR positive  -Antimicrobials: Vancomycin and zosyn- continue both today  -Prior micro: Staph hominis R to methicillin     Hematology:    # Leukocytosis, resolved  - Likely in the setting of sepsis.     # Thrombocytopenia, improving  Acute drop 220 (on admission) > 94 (10/27). Potentially in the setting of consumption for sepsis. 4T score is 4.  - 4 ext DVT US (negative) on 10/27/2020    Musculoskeletal:  # Chronic physical deconditioning    PT/OT/Speech     Skin:  # Pressure injury on coccys, right mid back and right posterior groin    WOC following, no debridement recommended at this point.     General Cares/Prophylaxis:    DVT Prophylaxis: Enoxaparin SQ  GI Prophylaxis: PPI  Restraints: None.  Family Communication: Updated in person.  Code Status: Full code per prior code status, unable to discuss with patient.     Lines/tubes/drains:  - Glucose: Medium ISS.  - Head: 30 degree elevation.  - Lines: R femoral CVC, L femoral A line.     Disposition:  - Medical  ICU.     Patient seen and findings/plan discussed with medical ICU staff, Dr. Carrillo.    Carolynn Connors, ACNP  ====================================  INTERVAL HISTORY:   No acute events overnight. No longer on pressors, comfortable on Dex drip at low dose.     OBJECTIVE:   1. VITAL SIGNS:   Temp:  [97  F (36.1  C)-98.8  F (37.1  C)] 98.1  F (36.7  C)  Pulse:  [61-96] 87  Resp:  [14-16] 16  MAP:  [58 mmHg-99 mmHg] 95 mmHg  Arterial Line BP: ()/(43-86) 138/62  FiO2 (%):  [30 %] 30 %  SpO2:  [95 %-100 %] 99 %  Ventilation Mode: CMV/AC  (Continuous Mandatory Ventilation/ Assist Control)  FiO2 (%): 30 %  Rate Set (breaths/minute): 14 breaths/min  Tidal Volume Set (mL): 450 mL  PEEP (cm H2O): 8 cmH2O  Oxygen Concentration (%): 30 %  Resp: 16    2. INTAKE/ OUTPUT:   I/O last 3 completed shifts:  In: 3667 [I.V.:1397; NG/GT:450; IV Piggyback:500]  Out: 4345 [Urine:3845; Emesis/NG output:500]    3. PHYSICAL EXAMINATION:  General: NAD  HEENT: NC, AT, PERRLA, EOMI.  Neuro: Sedated, moves extremities spontaneously, open eyes to voice.  Pulm/Resp: Coarse breath sounds bilaterally without rhonchi, crackles or wheeze, breathing non-labored, synchronous to ventilator.  CV: S1, S2 RRR, no rubs/murmurs/gallops.  Abdomen: Soft, non-distended, non-tender.  : Lerma catheter in place, urine light yellow, femoral lines clean and dressed.    4. LABS:   Arterial Blood Gases   Recent Labs   Lab 10/26/20  1130 10/26/20  0404 10/25/20  2100 10/25/20  1645   PH 7.41 7.48* 7.30* 7.30*   PCO2 35 29* 32* 31*   PO2 110* 98 134* 149*   HCO3 22 22 16* 15*     Complete Blood Count   Recent Labs   Lab 10/28/20  0402 10/27/20  1410 10/27/20  0331 10/26/20  0400   WBC 7.8 8.6 7.8 17.0*   HGB 8.6* 8.1* 8.2* 9.8*   * 94* 95* 147*     Basic Metabolic Panel  Recent Labs   Lab 10/28/20  0402 10/27/20  0331 10/26/20  2227 10/26/20  1130 10/26/20  0400 10/25/20  1345 10/25/20  1345   * 139  --   --  136  --  135   POTASSIUM 3.8 3.9 3.8 3.3* 3.3*    < > 4.1   CHLORIDE 117* 110*  --   --  106  --  102   CO2 25 23  --   --  21  --  16*   BUN 18 20  --   --  24  --  41*   CR 0.82 0.95  --   --  1.37*  --  2.49*   * 174*  --   --  208*  --  219*    < > = values in this interval not displayed.     Liver Function Tests  Recent Labs   Lab 10/28/20  0402 10/27/20  0331 10/26/20  0400 10/25/20  1345 10/25/20  0742   AST 48* 52* 52* 42 27   ALT 32 28 24 15 15   ALKPHOS 73 52 66 83 72   BILITOTAL 0.4 0.4 0.7 1.2 0.9   ALBUMIN 1.8* 1.8* 1.8* 2.2* 2.1*   INR  --   --  1.82* 1.86* 1.89*     Coagulation Profile  Recent Labs   Lab 10/26/20  0400 10/25/20  1345 10/25/20  0742   INR 1.82* 1.86* 1.89*   PTT  --  38*  --        5. RADIOLOGY:   Recent Results (from the past 24 hour(s))   US Upper Extremity Venous Duplex Bilat    Narrative    EXAM: DOPPLER VENOUS ULTRASOUND OF BILATERAL UPPER EXTREMITIES,  10/27/2020 11:38 AM     HISTORY: drop in platelets.    COMPARISON: Ultrasound 12/2/2016.    TECHNIQUE:  Gray-scale evaluation with compression, spectral flow, and  color Doppler assessment of the deep venous system of both upper  extremities.    FINDINGS:  Normal blood flow and waveforms are demonstrated in the internal  jugular, innominate, subclavian, and axillary veins bilaterally. There  is normal compressibility of the brachial, basilic and cephalic veins  bilaterally.      Impression    IMPRESSION:  No evidence of deep venous thrombosis in either upper extremity.    I have personally reviewed the examination and initial interpretation  and I agree with the findings.    ADRI RANGEL MD   US Lower Extremity Venous Bilateral Port    Narrative    EXAM: DOPPLER VENOUS ULTRASOUND OF BILATERAL LOWER EXTREMITIES,  10/27/2020 11:39 AM.    HISTORY: drop in platelets.    COMPARISON:  Ultrasound 11/26/2016.    TECHNIQUE:  Gray-scale evaluation with compression, spectral flow and  color Doppler assessment of the deep venous system of both legs from  groin to knee, and then at  the ankles.    FINDINGS:  In both lower extremities, the common femoral, femoral, popliteal and  posterior tibial veins demonstrate normal compressibility and blood  flow.       Impression    IMPRESSION:  No evidence of deep venous thrombosis in either lower extremity.    I have personally reviewed the examination and initial interpretation  and I agree with the findings.    ADRI RANGEL MD

## 2020-10-28 NOTE — PROGRESS NOTES
Incident Report    We were called by Mr. Farias's nurse with a critical Na value of 167 x2. He has been polyuric today with 6 L of UOP. Per nephrology recs, we administered ddavp 24mcg + 1L D5 bolus followed by D5 @ 250 ml/L to replace a FWD of 7L.     Urine Na, Urine Osm, Serum sodium, Serum Osm pending.     Care of patient discussed with Dr. Carrillo.    Khanh Neumann MD  Internal Medicine Resident ([id number])  [id number]

## 2020-10-28 NOTE — PLAN OF CARE
Major Shift Events: Pt. A little more awake overnight. Not following commands. Sinus rhythm and maintained blood pressure without pressors all shift. Tolerating current vent settings. Lots of cloudy, thick secretions. Tube feeds continue at goal. G tube having  output q4h. Lerma having high output. Insulin gtt continues. K replaced and phosphorus ordered. K recheck next am and phosphorus 2 hours after infusion completes. Mom updated over phone.    Plan: Continue with current POC. Also, will pass off to next RN to get consent from Pt.'s Mom to get the flu vaccine.    For vital signs and complete assessments, please see documentation flowsheets.

## 2020-10-29 ENCOUNTER — APPOINTMENT (OUTPATIENT)
Dept: OCCUPATIONAL THERAPY | Facility: CLINIC | Age: 60
DRG: 871 | End: 2020-10-29
Attending: INTERNAL MEDICINE
Payer: MEDICARE

## 2020-10-29 LAB
ALBUMIN SERPL-MCNC: 2.1 G/DL (ref 3.4–5)
ALP SERPL-CCNC: 134 U/L (ref 40–150)
ALT SERPL W P-5'-P-CCNC: 67 U/L (ref 0–70)
ANION GAP SERPL CALCULATED.3IONS-SCNC: 5 MMOL/L (ref 3–14)
AST SERPL W P-5'-P-CCNC: 96 U/L (ref 0–45)
BACTERIA SPEC CULT: ABNORMAL
BASOPHILS # BLD AUTO: 0 10E9/L (ref 0–0.2)
BASOPHILS NFR BLD AUTO: 0.3 %
BILIRUB SERPL-MCNC: 1.3 MG/DL (ref 0.2–1.3)
BUN SERPL-MCNC: 14 MG/DL (ref 7–30)
CA-I BLD-MCNC: 4.1 MG/DL (ref 4.4–5.2)
CALCIUM SERPL-MCNC: 7.8 MG/DL (ref 8.5–10.1)
CHLORIDE SERPL-SCNC: 121 MMOL/L (ref 94–109)
CO2 SERPL-SCNC: 28 MMOL/L (ref 20–32)
CREAT SERPL-MCNC: 0.88 MG/DL (ref 0.66–1.25)
DIFFERENTIAL METHOD BLD: ABNORMAL
EOSINOPHIL # BLD AUTO: 0 10E9/L (ref 0–0.7)
EOSINOPHIL NFR BLD AUTO: 0.4 %
ERYTHROCYTE [DISTWIDTH] IN BLOOD BY AUTOMATED COUNT: 15.6 % (ref 10–15)
GFR SERPL CREATININE-BSD FRML MDRD: >90 ML/MIN/{1.73_M2}
GLUCOSE BLDC GLUCOMTR-MCNC: 124 MG/DL (ref 70–99)
GLUCOSE BLDC GLUCOMTR-MCNC: 139 MG/DL (ref 70–99)
GLUCOSE BLDC GLUCOMTR-MCNC: 74 MG/DL (ref 70–99)
GLUCOSE BLDC GLUCOMTR-MCNC: 75 MG/DL (ref 70–99)
GLUCOSE BLDC GLUCOMTR-MCNC: 76 MG/DL (ref 70–99)
GLUCOSE BLDC GLUCOMTR-MCNC: 82 MG/DL (ref 70–99)
GLUCOSE BLDC GLUCOMTR-MCNC: 99 MG/DL (ref 70–99)
GLUCOSE SERPL-MCNC: 78 MG/DL (ref 70–99)
HCT VFR BLD AUTO: 30.3 % (ref 40–53)
HGB BLD-MCNC: 9 G/DL (ref 13.3–17.7)
IMM GRANULOCYTES # BLD: 0.1 10E9/L (ref 0–0.4)
IMM GRANULOCYTES NFR BLD: 0.8 %
LYMPHOCYTES # BLD AUTO: 1.2 10E9/L (ref 0.8–5.3)
LYMPHOCYTES NFR BLD AUTO: 15.6 %
MAGNESIUM SERPL-MCNC: 2.2 MG/DL (ref 1.6–2.3)
MCH RBC QN AUTO: 28.7 PG (ref 26.5–33)
MCHC RBC AUTO-ENTMCNC: 29.7 G/DL (ref 31.5–36.5)
MCV RBC AUTO: 97 FL (ref 78–100)
MONOCYTES # BLD AUTO: 0.9 10E9/L (ref 0–1.3)
MONOCYTES NFR BLD AUTO: 11.2 %
NEUTROPHILS # BLD AUTO: 5.5 10E9/L (ref 1.6–8.3)
NEUTROPHILS NFR BLD AUTO: 71.7 %
NRBC # BLD AUTO: 0 10*3/UL
NRBC BLD AUTO-RTO: 0 /100
OSMOLALITY UR: 641 MMOL/KG (ref 100–1200)
PLATELET # BLD AUTO: 133 10E9/L (ref 150–450)
POTASSIUM SERPL-SCNC: 3 MMOL/L (ref 3.4–5.3)
POTASSIUM SERPL-SCNC: 3.6 MMOL/L (ref 3.4–5.3)
POTASSIUM SERPL-SCNC: 3.6 MMOL/L (ref 3.4–5.3)
PROT SERPL-MCNC: 6.1 G/DL (ref 6.8–8.8)
RBC # BLD AUTO: 3.14 10E12/L (ref 4.4–5.9)
SODIUM BLD-SCNC: 150 MMOL/L (ref 133–144)
SODIUM SERPL-SCNC: 140 MMOL/L (ref 133–144)
SODIUM SERPL-SCNC: 147 MMOL/L (ref 133–144)
SODIUM SERPL-SCNC: 147 MMOL/L (ref 133–144)
SODIUM SERPL-SCNC: 148 MMOL/L (ref 133–144)
SODIUM SERPL-SCNC: 149 MMOL/L (ref 133–144)
SODIUM SERPL-SCNC: 149 MMOL/L (ref 133–144)
SODIUM SERPL-SCNC: 153 MMOL/L (ref 133–144)
SODIUM SERPL-SCNC: 154 MMOL/L (ref 133–144)
SODIUM SERPL-SCNC: 160 MMOL/L (ref 133–144)
SPECIMEN SOURCE: ABNORMAL
WBC # BLD AUTO: 7.6 10E9/L (ref 4–11)

## 2020-10-29 PROCEDURE — 999N001017 HC STATISTIC GLUCOSE BY METER IP

## 2020-10-29 PROCEDURE — 83935 ASSAY OF URINE OSMOLALITY: CPT | Performed by: STUDENT IN AN ORGANIZED HEALTH CARE EDUCATION/TRAINING PROGRAM

## 2020-10-29 PROCEDURE — 84295 ASSAY OF SERUM SODIUM: CPT | Performed by: INTERNAL MEDICINE

## 2020-10-29 PROCEDURE — 82330 ASSAY OF CALCIUM: CPT | Performed by: STUDENT IN AN ORGANIZED HEALTH CARE EDUCATION/TRAINING PROGRAM

## 2020-10-29 PROCEDURE — 999N000147 HC STATISTIC PT IP EVAL DEFER

## 2020-10-29 PROCEDURE — 84132 ASSAY OF SERUM POTASSIUM: CPT | Performed by: INTERNAL MEDICINE

## 2020-10-29 PROCEDURE — C9113 INJ PANTOPRAZOLE SODIUM, VIA: HCPCS | Performed by: STUDENT IN AN ORGANIZED HEALTH CARE EDUCATION/TRAINING PROGRAM

## 2020-10-29 PROCEDURE — 84295 ASSAY OF SERUM SODIUM: CPT | Performed by: STUDENT IN AN ORGANIZED HEALTH CARE EDUCATION/TRAINING PROGRAM

## 2020-10-29 PROCEDURE — 85025 COMPLETE CBC W/AUTO DIFF WBC: CPT | Performed by: INTERNAL MEDICINE

## 2020-10-29 PROCEDURE — 250N000013 HC RX MED GY IP 250 OP 250 PS 637: Performed by: STUDENT IN AN ORGANIZED HEALTH CARE EDUCATION/TRAINING PROGRAM

## 2020-10-29 PROCEDURE — 999N000015 HC STATISTIC ARTERIAL MONITORING DAILY

## 2020-10-29 PROCEDURE — 97165 OT EVAL LOW COMPLEX 30 MIN: CPT | Mod: GO

## 2020-10-29 PROCEDURE — 258N000003 HC RX IP 258 OP 636: Performed by: STUDENT IN AN ORGANIZED HEALTH CARE EDUCATION/TRAINING PROGRAM

## 2020-10-29 PROCEDURE — 250N000011 HC RX IP 250 OP 636: Performed by: INTERNAL MEDICINE

## 2020-10-29 PROCEDURE — 36415 COLL VENOUS BLD VENIPUNCTURE: CPT | Performed by: STUDENT IN AN ORGANIZED HEALTH CARE EDUCATION/TRAINING PROGRAM

## 2020-10-29 PROCEDURE — 999N000043 HC STATISTIC CTO2 CONT OXYGEN TECH TIME EA 90 MIN

## 2020-10-29 PROCEDURE — 97110 THERAPEUTIC EXERCISES: CPT | Mod: GO

## 2020-10-29 PROCEDURE — 258N000001 HC RX 258: Performed by: STUDENT IN AN ORGANIZED HEALTH CARE EDUCATION/TRAINING PROGRAM

## 2020-10-29 PROCEDURE — 250N000011 HC RX IP 250 OP 636: Performed by: STUDENT IN AN ORGANIZED HEALTH CARE EDUCATION/TRAINING PROGRAM

## 2020-10-29 PROCEDURE — 258N000003 HC RX IP 258 OP 636: Performed by: INTERNAL MEDICINE

## 2020-10-29 PROCEDURE — 200N000002 HC R&B ICU UMMC

## 2020-10-29 PROCEDURE — 120N000002 HC R&B MED SURG/OB UMMC

## 2020-10-29 PROCEDURE — 99233 SBSQ HOSP IP/OBS HIGH 50: CPT | Mod: GC | Performed by: INTERNAL MEDICINE

## 2020-10-29 PROCEDURE — 999N000157 HC STATISTIC RCP TIME EA 10 MIN

## 2020-10-29 PROCEDURE — 272N000078 HC NUTRITION PRODUCT INTERMEDIATE LITER

## 2020-10-29 PROCEDURE — 83735 ASSAY OF MAGNESIUM: CPT | Performed by: INTERNAL MEDICINE

## 2020-10-29 PROCEDURE — 36415 COLL VENOUS BLD VENIPUNCTURE: CPT | Performed by: INTERNAL MEDICINE

## 2020-10-29 PROCEDURE — 80053 COMPREHEN METABOLIC PANEL: CPT | Performed by: INTERNAL MEDICINE

## 2020-10-29 PROCEDURE — 250N000013 HC RX MED GY IP 250 OP 250 PS 637: Performed by: INTERNAL MEDICINE

## 2020-10-29 RX ORDER — POTASSIUM CHLORIDE 1.5 G/1.58G
40 POWDER, FOR SOLUTION ORAL ONCE
Status: COMPLETED | OUTPATIENT
Start: 2020-10-29 | End: 2020-10-29

## 2020-10-29 RX ORDER — CIPROFLOXACIN 2 MG/ML
400 INJECTION, SOLUTION INTRAVENOUS EVERY 8 HOURS
Status: DISCONTINUED | OUTPATIENT
Start: 2020-10-29 | End: 2020-11-01

## 2020-10-29 RX ORDER — DEXTROSE MONOHYDRATE 50 MG/ML
INJECTION, SOLUTION INTRAVENOUS CONTINUOUS
Status: DISCONTINUED | OUTPATIENT
Start: 2020-10-29 | End: 2020-10-29

## 2020-10-29 RX ORDER — POTASSIUM CHLORIDE 1.5 G/1.58G
20 POWDER, FOR SOLUTION ORAL ONCE
Status: COMPLETED | OUTPATIENT
Start: 2020-10-29 | End: 2020-10-29

## 2020-10-29 RX ORDER — POTASSIUM CHLORIDE 7.45 MG/ML
10 INJECTION INTRAVENOUS
Status: CANCELLED | OUTPATIENT
Start: 2020-10-29 | End: 2020-10-29

## 2020-10-29 RX ADMIN — MICONAZOLE NITRATE: 20 POWDER TOPICAL at 08:25

## 2020-10-29 RX ADMIN — PIPERACILLIN AND TAZOBACTAM 3.38 G: 3; .375 INJECTION, POWDER, FOR SOLUTION INTRAVENOUS at 13:59

## 2020-10-29 RX ADMIN — PIPERACILLIN AND TAZOBACTAM 3.38 G: 3; .375 INJECTION, POWDER, FOR SOLUTION INTRAVENOUS at 08:18

## 2020-10-29 RX ADMIN — DEXTROSE AND SODIUM CHLORIDE: 5; 450 INJECTION, SOLUTION INTRAVENOUS at 01:34

## 2020-10-29 RX ADMIN — DEXTROSE MONOHYDRATE: 50 INJECTION, SOLUTION INTRAVENOUS at 10:24

## 2020-10-29 RX ADMIN — CARBAMAZEPINE 150 MG: 100 SUSPENSION ORAL at 11:25

## 2020-10-29 RX ADMIN — LEVOTHYROXINE SODIUM 150 MCG: 0.15 TABLET ORAL at 08:18

## 2020-10-29 RX ADMIN — METOCLOPRAMIDE HYDROCHLORIDE 10 MG: 5 SOLUTION ORAL at 11:25

## 2020-10-29 RX ADMIN — MICONAZOLE NITRATE: 20 POWDER TOPICAL at 20:45

## 2020-10-29 RX ADMIN — POTASSIUM & SODIUM PHOSPHATES POWDER PACK 280-160-250 MG 1 PACKET: 280-160-250 PACK at 20:44

## 2020-10-29 RX ADMIN — BRIVARACETAM 100 MG: 10 SOLUTION ORAL at 09:40

## 2020-10-29 RX ADMIN — PIPERACILLIN AND TAZOBACTAM 3.38 G: 3; .375 INJECTION, POWDER, FOR SOLUTION INTRAVENOUS at 01:34

## 2020-10-29 RX ADMIN — Medication 50 MCG: at 08:18

## 2020-10-29 RX ADMIN — POTASSIUM & SODIUM PHOSPHATES POWDER PACK 280-160-250 MG 1 PACKET: 280-160-250 PACK at 11:24

## 2020-10-29 RX ADMIN — POTASSIUM CHLORIDE 20 MEQ: 1.5 POWDER, FOR SOLUTION ORAL at 08:18

## 2020-10-29 RX ADMIN — METOCLOPRAMIDE HYDROCHLORIDE 10 MG: 5 SOLUTION ORAL at 21:13

## 2020-10-29 RX ADMIN — HYDROCORTISONE SODIUM SUCCINATE 50 MG: 100 INJECTION, POWDER, FOR SOLUTION INTRAMUSCULAR; INTRAVENOUS at 13:59

## 2020-10-29 RX ADMIN — HYDROCORTISONE SODIUM SUCCINATE 50 MG: 100 INJECTION, POWDER, FOR SOLUTION INTRAMUSCULAR; INTRAVENOUS at 20:44

## 2020-10-29 RX ADMIN — CALCIUM CARBONATE 1250 MG: 1250 SUSPENSION ORAL at 11:25

## 2020-10-29 RX ADMIN — MULTIVITAMIN 15 ML: LIQUID ORAL at 08:18

## 2020-10-29 RX ADMIN — BRIVARACETAM 100 MG: 10 SOLUTION ORAL at 20:48

## 2020-10-29 RX ADMIN — POTASSIUM & SODIUM PHOSPHATES POWDER PACK 280-160-250 MG 1 PACKET: 280-160-250 PACK at 17:22

## 2020-10-29 RX ADMIN — CARBAMAZEPINE 150 MG: 100 SUSPENSION ORAL at 17:21

## 2020-10-29 RX ADMIN — HYDROCORTISONE SODIUM SUCCINATE 50 MG: 100 INJECTION, POWDER, FOR SOLUTION INTRAMUSCULAR; INTRAVENOUS at 01:34

## 2020-10-29 RX ADMIN — CARBAMAZEPINE 150 MG: 100 SUSPENSION ORAL at 00:27

## 2020-10-29 RX ADMIN — METOCLOPRAMIDE HYDROCHLORIDE 10 MG: 5 SOLUTION ORAL at 17:21

## 2020-10-29 RX ADMIN — POTASSIUM & SODIUM PHOSPHATES POWDER PACK 280-160-250 MG 1 PACKET: 280-160-250 PACK at 08:17

## 2020-10-29 RX ADMIN — CIPROFLOXACIN 400 MG: 2 INJECTION, SOLUTION INTRAVENOUS at 17:21

## 2020-10-29 RX ADMIN — PANTOPRAZOLE SODIUM 40 MG: 40 INJECTION, POWDER, FOR SOLUTION INTRAVENOUS at 08:18

## 2020-10-29 RX ADMIN — CARBAMAZEPINE 150 MG: 100 SUSPENSION ORAL at 05:21

## 2020-10-29 RX ADMIN — METOCLOPRAMIDE HYDROCHLORIDE 10 MG: 5 SOLUTION ORAL at 08:38

## 2020-10-29 RX ADMIN — HYDROCORTISONE SODIUM SUCCINATE 50 MG: 100 INJECTION, POWDER, FOR SOLUTION INTRAMUSCULAR; INTRAVENOUS at 08:17

## 2020-10-29 RX ADMIN — SODIUM BICARBONATE 650 MG TABLET 650 MG: at 08:18

## 2020-10-29 RX ADMIN — CALCIUM CARBONATE 1250 MG: 1250 SUSPENSION ORAL at 08:38

## 2020-10-29 RX ADMIN — Medication 1 PACKET: at 11:24

## 2020-10-29 RX ADMIN — CALCIUM CARBONATE 1250 MG: 1250 SUSPENSION ORAL at 17:22

## 2020-10-29 RX ADMIN — CIPROFLOXACIN 400 MG: 2 INJECTION, SOLUTION INTRAVENOUS at 23:17

## 2020-10-29 RX ADMIN — ENOXAPARIN SODIUM 40 MG: 40 INJECTION SUBCUTANEOUS at 05:22

## 2020-10-29 RX ADMIN — POTASSIUM CHLORIDE 40 MEQ: 1.5 POWDER, FOR SOLUTION ORAL at 05:22

## 2020-10-29 RX ADMIN — VANCOMYCIN HYDROCHLORIDE 1500 MG: 10 INJECTION, POWDER, LYOPHILIZED, FOR SOLUTION INTRAVENOUS at 08:37

## 2020-10-29 RX ADMIN — BACITRACIN ZINC: 500 OINTMENT TOPICAL at 08:23

## 2020-10-29 ASSESSMENT — ACTIVITIES OF DAILY LIVING (ADL)
ADLS_ACUITY_SCORE: 40

## 2020-10-29 ASSESSMENT — MIFFLIN-ST. JEOR: SCORE: 1597.13

## 2020-10-29 NOTE — PLAN OF CARE
Major Shift Events:  Sodium levels remain high. Titrating D5 + 1/2 NS and free water via J-Tube. Able to wean down [place] NC to 30% FiO2 @ 30lpm. 2.5L UO via aaron over shift. Potassium replaced.     Plan: Continue to wean O2 as able. Recommend Neuro consult for evidence of DI, abnormal Sodium levels, and off baseline cognitive status (according to mother).     For vital signs and complete assessments, please see documentation flowsheets.

## 2020-10-29 NOTE — PLAN OF CARE
4A - Per discussion with OT and chart review, lives at home with mother with PCA(12hrs), pt uses w/c, decreased physical activity, lift dependent at baseline with total cares, OT following for education and family education as appropriate. Pt with no skilled inpatient PT needs, Will complete consult and defer evaluation, please discuss with PT/OT prior to placing new PT consult.
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
## 2020-10-29 NOTE — PLAN OF CARE
Major Shift Events:  Patient on 30L 30% Heated high flow this entire shift, weaning currently. Skin under device assessed- red but blanchable.     Patient unable to verbalize or follow commands. Altert when up in chair.     Lung sounds coarse, patient suctioned frequently.     Plan: Transfer to floor    For vital signs and complete assessments, please see documentation flowsheets.

## 2020-10-29 NOTE — PROGRESS NOTES
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    CHANGES and MAJOR THINGS TODAY:   -Transfer to medicine.  -Switch vancomycin and zosyn to ciprofloxacin IV bid through 10/31 to complete 7 days of treatment.  -Continue stress dose steroids hydrocortison q6H through today, consider tapering starting tomorrow.  -Continue to follow sodium to goal: AM Na 150 > FWD: 1.5L (2L with insensible losses) to 145.  -Resume trickle TF @20cc/hr while weaning from D5W to maintain BS.    PLAN:    Neuro:-   # Pain and sedation  -Extubated 10/28.     # TBI c/b spastic hemiplegia and severe dysphagia s/p GJ tube  # Pan-hypopituitarism  -Continue stress dose steroids today and consider returning to PTA dose on 10/30.     # Seizure disorder  -Continue PTA carbamazepine.  -Continue PTA brivaracetam.     # Acute toxic metabolic encephalopathy 2/2 septic shock, hypoxemia, improving  - Lethargic at home.  - Found febrile and sating in the 70s with increased WOB.   - Electrolytes with mild hyponatremia and hypocalcemia, BUN elevated but no uremia, BS within normal limits  - Imaging with evidence of bilateral pneumonia.   - Noted elevated carbamazepine level.   - Acid base status with mild metabolic acidosis and associated respiratory alkalosis  - Appears more at baseline today.  - Following infectious workup.     Pulmonary:  # Acute hypoxemic respiratory failure 2/2 likely recurrent  aspiration pneumonia  - Hypoxemia, increased WOB and AMS at home.   - Intubated on arrival of OSH.   - Differential: Likely pneumonia from bilateral infiltrates in dependent areas  (suggests aspiration) and elevated procalcitonin and leukocytosis concerning for developing ARDS , CTPE negative for PE, CT did not suggest atelectasis or mucus plug.   - Of note, patient has been admitted 10 times this year for sepsis in the setting of recurrent aspiration pneumonia.    Antibiotics per below    Current vent settings:  -Ventilation Mode: CPAP/PS  (Continuous positive airway pressure with Pressure Support) (PS per MD order; peep 5)  FiO2 (%): 30 %  Rate Set (breaths/minute): 14 breaths/min  Tidal Volume Set (mL): 450 mL  PEEP (cm H2O): 5 cmH2O  Pressure Support (cm H2O): 7 cmH2O  Oxygen Concentration (%): 30 %  Resp: 20    PST and hopeful extubation today     Cardiovascular:  # Shock, resolved  - Hypotensive on presentation, persistently tachycardic and obtunded requiring airway protection. Labs with ALMAS likely prerenal.   Likely septic in the setting of bilateral pneumonia.  -Distributive: Likely from sepsis.  -Obstructive: CXR without PNT, negative CT-PE.  -Hypovolemic: Collapsing IVC suggests hypovolemia > dilated IVC on 10/26.  -Cardiogenic: Bedside TTE on 10/25 (performed by Cardiology) with normal EF, no wall motion abnormalities, of notice, collapsing IVC > Formal TTE on 10/26 with dilated IVC.  - Off of pressor for past 24 hours      GI/Nutrition:  # Severe dysphagia s/p GJ tube  # Concern for gastroparesis  - Recurrent aspiration as noted above  -Speech evaluation once extubated and mental status allows.  -Resumed PTA metoclopramide on 10/27  -Hold tube feedings prior to extubation, resume once stable after extubation     # Nutrition  -TF resumed, nutrition following     Renal/Fluids/Electrolytes:  # ALMAS likely prerrenal vs ATN in the setting of septic shock, resolved  Bl Cr: 0.7-0.9  Adm Cr: 3.28; BUN/Cr 15.2  -  Differential suggesting prerenal with improvement after fluid resuscitation to 2.49.   - UA with protein and RBC in urine but no blood in dipstick, no leukocyturia.  - Now auto-diuresing overnight    Avoid nephrotoxins    Strict I/O, daily weights    Monitor    # Mild hypocalcemia, improved  - S/P 2gr Ca gluconate.    # Mild hyponatremia, resolved  In the setting of hypovolemia, resolved with fluid resuscitation.     # Hypernatremia  Last Na: 150 (10/29/20 8:20 AM)  On 10/28 patient developed polyuria with UOP 9L. Urine studies with decreased uOsm and elevated sOsm consistent with DI, although in the setting of recovery from ALMAS, more likely to represent post ATN diuresis.  -Following Na q2H > May switch to q6H.  -FWD: 1.5L + insensible losses > total 2L in the next 24H.   -Holding     Endocrine:  # Central Hypothyroidism    Continue PTA levothyroxine.     # Central adrenal insufficiency  - On PTA hydrocortisone 20 mg AM and 10 in PM    Currently on stress dose steroids 50mg q6H, continue for today    # Stress and steroid induced hyperglycemia  - Has been on insulin drip    Will transition to lantus 20 U daily and and moderate sliding scale insulin today    ID:  # Septic shock likely from aspiration pneumonia  Culture with polymicrobial growth, with ESBL pseudomonas, suggesting it is colonizing than rather pathogen with clinical improvement on zosyn. De-escalating to ciprofloxacin to cover all 3 microorganisms.  -Fluid resuscitation: S/P 3L NS at OSH, and 3L LR on admission, 1L on 10/26.  -Source control: If course remains critical, may require bronchoscopy  -Pressor support: On NE, , epinephrine, goal MAP >65.  -Steroid support: S/P hydrocortisone 100mg at OSH, continue 60mg q6H.  -Micro studies: BC NGTD, UA (no WBC), UC NGTD, (+) MRSA nares  Sputum 10/25 positive for Klebsiella, Pseudomonas and MRSA; MRSA PCR positive  -Antimicrobials: Vancomycin and zosyn.  -Prior micro: Staph hominis R to  methicillin    Antimicrobials:  -Vancomycin (10/25-10/29)  -Zosyn (10/25-10/29)  -Ciprofloxacin (10/29-10/31)    Micro:  -Sputum culture:   -K. Pneumoniae: R to amp.   -P. Aeruginosa: R to ceftazidime and cefepime.   -MRSA.     Hematology:    # Leukocytosis, resolved  - Likely in the setting of sepsis.     # Thrombocytopenia, improving  Acute drop 220 (on admission) > 94 (10/27). Potentially in the setting of consumption for sepsis. 4T score is 4.  - 4 ext DVT US (negative) on 10/27/2020    Musculoskeletal:  # Chronic physical deconditioning    PT/OT/Speech     Skin:  # Pressure injury on coccys, right mid back and right posterior groin    WOC following, no debridement recommended at this point.     General Cares/Prophylaxis:    DVT Prophylaxis: Enoxaparin SQ  GI Prophylaxis: PPI  Restraints: None.  Family Communication: Updated in person.  Code Status: Full code per prior code status, unable to discuss with patient.     Lines/tubes/drains:  - Glucose: Medium ISS.  - Head: 30 degree elevation.  - Lines: PIV x2.     Disposition:  -Transfer to medicine.     Patient seen and findings/plan discussed with medical ICU staff, Dr. Carrillo.    Abilio Fine MD  Internal Medicine Resident PG-Y2  MICU service  Pager: [phone]  ====================================  INTERVAL HISTORY:   Hypernatremia overnight corrected with desmopressin 24mcg and D5/NaCl 0.45% via 270cc ( drop from 168 > 165) transitioned to 500cc FWF q4H + D5 @50cc/hour > Na 154 in AM. Patient sleepy overnight. No fever. Off pressor support.    OBJECTIVE:   1. VITAL SIGNS:   Temp:  [97.5  F (36.4  C)-98.4  F (36.9  C)] 98.1  F (36.7  C)  Pulse:  [] 89  Resp:  [18-20] 20  BP: (107-168)/(54-85) 149/83  FiO2 (%):  [30 %-100 %] 30 %  SpO2:  [94 %-100 %] 98 %  Ventilation Mode: CPAP/PS  (Continuous positive airway pressure with Pressure Support) (PS per MD order; peep 5)  FiO2 (%): 30 %  Rate Set (breaths/minute): 14 breaths/min  Tidal Volume  Set (mL): 450 mL  PEEP (cm H2O): 5 cmH2O  Pressure Support (cm H2O): 7 cmH2O  Oxygen Concentration (%): 30 %  Resp: 20    2. INTAKE/ OUTPUT:   I/O last 3 completed shifts:  In: 4862.5 [I.V.:3782.5; NG/GT:970]  Out: 6475 [Urine:5650; Emesis/NG output:825]    3. PHYSICAL EXAMINATION:  General: NAD  HEENT: NC, AT, PERRLA, EOMI.  Neuro: Withdraws from pain, moves extremities spontaneously, open eyes to voice.  Pulm/Resp: Coarse breath sounds bilaterally without rhonchi, crackles or wheeze, breathing non-labored, synchronous to ventilator.  CV: S1, S2 RRR, no rubs/murmurs/gallops.  Abdomen: Soft, non-distended, non-tender.  : Lerma catheter in place, urine light yellow, femoral lines clean and dressed.    4. LABS:   Arterial Blood Gases   Recent Labs   Lab 10/26/20  1130 10/26/20  0404 10/25/20  2100 10/25/20  1645   PH 7.41 7.48* 7.30* 7.30*   PCO2 35 29* 32* 31*   PO2 110* 98 134* 149*   HCO3 22 22 16* 15*     Complete Blood Count   Recent Labs   Lab 10/29/20  0413 10/28/20  0402 10/27/20  1410 10/27/20  0331   WBC 7.6 7.8 8.6 7.8   HGB 9.0* 8.6* 8.1* 8.2*   * 110* 94* 95*     Basic Metabolic Panel  Recent Labs   Lab 10/29/20  1316 10/29/20  1003 10/29/20  0819 10/29/20  0602 10/29/20  0413 10/28/20  1434 10/28/20  1434 10/28/20  0402 10/28/20  0402 10/27/20  0331 10/26/20  0400 10/26/20  0400   * 149* 150* 140 154*   < > 167*   < > 147* 139  --  136   POTASSIUM 3.6 3.6  --   --  3.0*  --  3.8  --  3.8 3.9   < > 3.3*   CHLORIDE  --   --   --   --  121*  --   --   --  117* 110*  --  106   CO2  --   --   --   --  28  --   --   --  25 23  --  21   BUN  --   --   --   --  14  --   --   --  18 20  --  24   CR  --   --   --   --  0.88  --   --   --  0.82 0.95  --  1.37*   GLC  --   --   --   --  78  --   --   --  117* 174*  --  208*    < > = values in this interval not displayed.     Liver Function Tests  Recent Labs   Lab 10/29/20  0413 10/28/20  0402 10/27/20  0331 10/26/20  0400 10/25/20  3150  10/25/20  0742   AST 96* 48* 52* 52* 42 27   ALT 67 32 28 24 15 15   ALKPHOS 134 73 52 66 83 72   BILITOTAL 1.3 0.4 0.4 0.7 1.2 0.9   ALBUMIN 2.1* 1.8* 1.8* 1.8* 2.2* 2.1*   INR  --   --   --  1.82* 1.86* 1.89*     Coagulation Profile  Recent Labs   Lab 10/26/20  0400 10/25/20  1345 10/25/20  0742   INR 1.82* 1.86* 1.89*   PTT  --  38*  --        5. RADIOLOGY:   No results found for this or any previous visit (from the past 24 hour(s)).

## 2020-10-29 NOTE — PROGRESS NOTES
Antimicrobial Stewardship Team Note    Antimicrobial Stewardship Program - A joint venture between [facility] and  Physicians to optimize antibiotic management.  NOT a formal consult - Restricted Antimicrobial Review     Patient: Keyon Farias  MRN: [id number]  Allergies: Valproic acid, Dilantin [phenytoin sodium], and Scopolamine    Brief Summary: Keyon Farias is a 60 year old male who was transferred from [facility] on 10/25/2020 with septic shock in the setting of likely aspiration pneumonia. 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     HPI: Patent presented to [facility] ED via EMS with fevers (tmax 102.7F at home) and respiratory distress with SpO2 in high 70s on RA requiring intubation in the ED subsequently transferred to [facility]. On presentation his vital signs were significant for fever (tmax 101.7F), hypotension (BP 56/39) requiring 3 pressors (norepinephrine, vasopressin, and epinephrine) as well as stress dose steroids. Labs were significant for leukocytosis (WBC 23.1, ANC 13.5), elevated Scr 2.49, BUN 41, lactic acid 7.7, procal 24.59, and . Initial CXR with increased right base infiltrate with clear left lung. CT chest PE/a/p with no evidence of PE. Consolidation in both lower lobes right worse than left, concerning for pneumonia and unremarkable abdomen and pelvis. Patient was initiated on empiric Zosyn and vancomycin for septic shock 2/2 aspiration pneumonia.   Blood culture NGTD x4 days, endotracheal sputum culture with light growth of K pneumo, PsA (R-cefepime, ceftaz, Zosyn, katie), MRSA. Nasal MRSA PCR test positive. COVID-19 negative.  UA/UC negative (1 WBC and negative LE) on 10/25 and 10/28. Patient currently has 2 peripheral lines placed on 10/25 and 10/28.      Of note, patient has been admitted multiple times (~10) this year for sepsis in the setting of recurrent aspiration pneumonia. He was last evaluated by ID at [facility] for similar presentation thought to be likely 2/2 chemical aspiration alone given rapid resolution of sepsis on Zosyn alone despite prior history of MDR PsA and MRSA colonization in the past (2017).     Patient was extubated 10/28 currently on high flow NC (30 LPM down from yesterday at 40), fever curve down and has been afebrile >72 hours, WBC has trended down to 7.6 today, ALMAS resolved (Scr 0.88 today), off all presssors since evening of 10/27. Per nursing notes from yesterday patient appears to be alert/awake but not following commands with irregular pupill shape, and sluggish with spontaneous movement of LUE. He was found to have elevated Na+ 167 with profound polyuria (>6 L of UOP on 10/28) given desmopressin IV x1 and IVF given with improvement today.          Active Anti-infective Medications   (From admission, onward)                 Start     Stop    10/27/20 1900  vancomycin 1500 mg  1,500 mg,   Intravenous,   EVERY 18 HOURS     Aspiration Pneumonia        --    10/25/20 1400  piperacillin-tazobactam  3.375 g,   Intravenous,   EVERY 6 HOURS     Aspiration Pneumonia        --                  Assessment: Septic shock 2/2 aspiration pneumonia   61 yo male transferred from OSH with septic shock likely 2/2 pulmonary source given respiratory distress on presentation requiring intubation, infiltrates on chest imaging as well as history of recurrent aspiration pneumonia. He is currently on day 5 of empiric Zosyn and vancomycin. Patient has been extubated with rapid improvement in his VS and labs and overall clinical status. He remains afebrile for >72 hours with blood cultures NGTDx4 days. Sputum culture has been finalized today with light growth of polymicrobial pathogens including MDR PsA and MRSA. Unclear which of these organisms are the  main culprit of septic shock given light growth on culture and patient's rapid improvement while on empiric therapy. Chemical aspiration is likely contributed to patient's septic shock. However, it is reasonable to continue vancomycin IV to complete 7 days of therapy (end date 10/31) given septic shock on presentation and elevated procalcitonin. Recommend switching Zosyn to oral ciprofloxacin for 2 days to complete 7 total days of treatment given patients rapid improvement while on Zosyn. Patient has had multiple hospitalizations in the past with similar presentations and rapid improvement likely due to his underlying dysphagia and PEG tube putting him at risk of aspiration.    Recommendations:  Continue vancomycin for a total of 7 days (end date 10/31)  Switch Zosyn to ciprofloxacin  mg q12h for 2 more days to complete 7 days of therapy (end date 10/31)    Pharmacy took the following actions: Called/paged provider, Electronic note created.    Discussed with ID Staff: MD Leidy Shah, PharmD, BCIDP  Pager: [phone]    Vital Signs/Clinical Features:  Vitals         10/27 0700  -  10/28 0659 10/28 0700  -  10/29 0659 10/29 0700  -  10/29 1437   Most Recent    Temp ( F) 96.6 -  98.8    97.5 -  98.4    97.7 -  98.1     98.1 (36.7)    Pulse 59 -  88    59 -  115    54 -  96     89    Resp 14 -  16    16 -  20       20    BP   121/54 -  168/84    107/58 -  149/83     149/83    SpO2 (%) 95 -  100    89 -  100    98 -  100     98            Labs  Estimated Creatinine Clearance: 95.6 mL/min (based on SCr of 0.88 mg/dL).  Recent Labs   Lab Test 10/25/20  0742 10/25/20  1345 10/26/20  0400 10/27/20  0331 10/28/20  0402 10/29/20  0413   CR 3.28* 2.49* 1.37* 0.95 0.82 0.88       Recent Labs   Lab Test 10/25/20  0742 10/25/20  1345 10/26/20  0400 10/27/20  0331 10/27/20  1410 10/28/20  0402 10/29/20  0413   WBC 23.1* 27.2* 17.0* 7.8 8.6 7.8 7.6   ANEU 13.5*  --  13.7* 6.2 6.7 6.3 5.5   ALYM 7.0*  --  2.3  1.2 1.2 1.0 1.2   LORENZO 2.2*  --  0.8 0.4 0.6 0.4 0.9   AEOS 0.2  --  0.0 0.0 0.0 0.0 0.0   HGB 12.6* 11.7* 9.8* 8.2* 8.1* 8.6* 9.0*   HCT 37.7* 36.4* 29.2* 25.7* 24.7* 27.0* 30.3*   MCV 90 91 89 91 91 92 97    224 147* 95* 94* 110* 133*       Recent Labs   Lab Test 10/25/20  0742 10/25/20  1345 10/26/20  0400 10/27/20  0331 10/28/20  0402 10/29/20  0413   BILITOTAL 0.9 1.2 0.7 0.4 0.4 1.3   ALKPHOS 72 83 66 52 73 134   ALBUMIN 2.1* 2.2* 1.8* 1.8* 1.8* 2.1*   AST 27 42 52* 52* 48* 96*   ALT 15 15 24 28 32 67       Recent Labs   Lab Test 01/25/17  0426 01/30/17  0340 01/30/17  0340 02/06/17  0228 02/06/17  0228 01/31/19  1324 01/31/19  1324 10/26/19  1530 10/26/19  1530 03/23/20  2122 03/23/20  2122 05/04/20  2109 05/04/20  2109 05/14/20  2222 05/14/20  2222 08/21/20  0007 08/21/20  0007 09/25/20  0636 09/25/20  0636 10/25/20  1345 10/25/20  1345 10/25/20  1645 10/25/20  2100 10/25/20  2357 10/26/20  1130 10/27/20  1116 10/28/20  1345   PCAL  --   --   --   --    < > 0.07   < > 0.11   < > <0.05  --  <0.05  --  0.15  --  0.08  --  0.50  --  24.59*  --   --   --   --   --   --   --    LACT  --   --    < >  --    < > 1.7   < >  --    < > 2.1*   < > 2.2*   < >  --    < > 1.4   < >  --    < >  --    < > 9.5* 7.8* 3.8* 2.7* 2.9* 2.4*   .0* 180.0*  --  87.0*  --  71.1*  --  87.3*  --   --   --   --   --   --   --   --   --   --   --   --   --   --   --   --   --   --   --     < > = values in this interval not displayed.       Recent Labs   Lab Test 10/26/20  0400   VANCOMYCIN 8.5       Culture Results:  7-Day Micro Results       Procedure Component Value Units Date/Time    Methicillin Resist/Sens S. aureus PCR [[id number]]  (Abnormal) Collected: 10/25/20 1647    Order Status: Completed Lab Status: Final result Updated: 10/25/20 1935    Specimen: Nares      Specimen Description Nares     Methicillin Resist/Sens S. aureus PCR Positive     Comment: MRSA Positive: SA Positive  MRSA and Staphylococcus aureus target DNA    detected, presumed positive for MRSA and SA colonization. A positive test does   not necessarily indicate the presence of viable organisms. It is,however,   presumptive for the presence of MRSA or SA. FDA approved assay performed using   [employer] GeneXpert(R) real-time PCR.         Sputum Culture Aerobic Bacterial [[id number]]  (Abnormal)  (Susceptibility) Collected: 10/25/20 1520    Order Status: Completed Lab Status: Final result Updated: 10/29/20 0989    Specimen: Sputum      Specimen Description Sputum Endotracheal     Culture Micro Light growth  Klebsiella pneumoniae        Light growth  Pseudomonas aeruginosa        Light growth  Methicillin resistant Staphylococcus aureus (MRSA)      Susceptibility       Klebsiella pneumoniae (1)       Antibiotic Interpretation Sensitivity Method Status    AMPICILLIN Resistant   TORY Final     Intrinsically Resistant    AMPICILLIN/SULBACTAM Sensitive 4 ug/mL TORY Final    CEFEPIME Sensitive <=1 ug/mL TORY Final    CEFTAZIDIME Sensitive <=1 ug/mL TORY Final    CEFTRIAXONE Sensitive <=1 ug/mL TORY Final    CIPROFLOXACIN Sensitive <=0.25 ug/mL TORY Final    GENTAMICIN Sensitive <=1 ug/mL TORY Final    LEVOFLOXACIN Sensitive <=0.12 ug/mL TORY Final    Piperacillin/Tazo Sensitive <=4 ug/mL TORY Final    TOBRAMYCIN Sensitive <=1 ug/mL TORY Final    Trimethoprim/Sulfa Sensitive <=1/19 ug/mL TORY Final    MEROPENEM Sensitive <=0.25 ug/mL TORY Final    Ext Spect B Lac Prod [*]  Sensitive Negative  TORY Final              Methicillin resistant staphylococcus aureus (mrsa) (3)       Antibiotic Interpretation Sensitivity Method Status    CIPROFLOXACIN [*]  Resistant >=8 ug/mL TORY Final    CLINDAMYCIN Resistant   TORY Final     This isolate is presumed to be clindamycin resistant based on detection of   inducible clindamycin resistance. Erythromycin and clindamycin are resistant,   therefore, they are not recommended for use.    ERYTHROMYCIN Resistant >=8 ug/mL TORY Final    GENTAMICIN Sensitive <=0.5  ug/mL TORY Final    LEVOFLOXACIN [*]  Resistant >=8 ug/mL TORY Final    OXACILLIN Resistant >=4 ug/mL TORY Final    TETRACYCLINE Sensitive <=1 ug/mL TORY Final    Trimethoprim/Sulfa Sensitive <=0.5/9.5 ug/mL TORY Final    VANCOMYCIN Sensitive 1 ug/mL TORY Final    RIFAMPIN [*]  Sensitive <=0.5 ug/mL TORY Final    TIGECYCLINE [*]  Sensitive <=0.12 ug/mL TORY Final    LINEZOLID Sensitive 2 ug/mL TORY Final    Quinupristin/Dalfopr [*]  Sensitive <=0.25 ug/ml TORY Final    MOXIFLOXACIN [*]  Resistant >=8 ug/mL TORY Final    Cefoxitin Screen [*]  Resistant Positive  TORY Final              Pseudomonas aeruginosa (2)       Antibiotic Interpretation Sensitivity Method Status    AMIKACIN Sensitive 4 ug/mL TORY Final    CEFEPIME Resistant 32 ug/mL TORY Final    CEFTAZIDIME Resistant 32 ug/mL TORY Final    CIPROFLOXACIN Sensitive <=0.25 ug/mL TORY Final    GENTAMICIN Sensitive <=1 ug/mL TORY Final    LEVOFLOXACIN Sensitive 0.5 ug/mL TORY Final    TOBRAMYCIN Sensitive <=1 ug/mL TORY Final    Piperacillin/Tazo Resistant >64.0 ug/mL TORY Final    MEROPENEM Resistant 16.0 ug/mL TORY Final               [*]   Suppressed Antibiotic                   Gram stain [[id number]] Collected: 10/25/20 1520    Order Status: Completed Lab Status: Final result Updated: 10/25/20 2240    Specimen: Sputum      Specimen Description Sputum Endotracheal     Gram Stain >25 PMNs/low power field      Moderate  Mixed gram negative and positive jaime      Methicillin Resist/Sens S. aureus PCR     Order Status: Canceled Lab Status: No result     Specimen: Nasal Swab     Urine Culture [[id number]] Collected: 10/25/20 0758    Order Status: Completed Lab Status: Final result Updated: 10/26/20 3529    Specimen: Catheterized Urine      Specimen Description Catheterized Urine     Special Requests Specimen received in preservative     Culture Micro No growth    Blood culture [[id number]] Collected: 10/25/20 0758    Order Status: Completed Lab Status: Preliminary result Updated: 10/29/20 9264     Specimen: Blood      Specimen Description Blood White port     Culture Micro No growth after 4 days    Blood culture [[id number]] Collected: 10/25/20 0742    Order Status: Completed Lab Status: Preliminary result Updated: 10/29/20 0342    Specimen: Blood      Specimen Description Blood Brown port     Culture Micro No growth after 4 days            Recent Labs   Lab Test 05/14/20  2031 08/10/20  2243 08/28/20  0429 10/25/20  0758 10/28/20  1426   URINEPH 8.0* 8.0* 7.5* 5.5 7.0   NITRITE Negative Negative Negative Negative Negative   LEUKEST Negative Negative Negative Negative Negative   WBCU <1 0 0 1 1             Recent Labs   Lab Test 09/13/19  2229   RVSPEC Nasopharyngeal   IFLUA Negative   FLUAH1 Negative   FLUAH3 Negative   [id number] Negative   IFLUB Negative   RSVA Negative   RSVB Negative   PIV1 Negative   PIV2 Negative   PIV3 Negative   HMPV Negative   HRVS Negative   ADVBE Negative   ADVC Negative       Recent Labs   Lab Test 09/16/19  1649   CDBPCT Negative       Imaging: Ir Gastro Jejunostomy Tube Check    Result Date: 10/24/2020  INTERVENTIONAL RADIOLOGY GASTROJEJUNOSTOMY TUBE CHECK 10/23/2020 4:54 PM [facility] [facility] DATE: 10/23/2020 PROCEDURE: PERCUTANEOUS GASTROJEJUNOSTOMY CHECK/UNCLOGGING INTERVENTIONAL RADIOLOGIST: Megan Stout DO INDICATION: Occluded recently placed gastrojejunostomy tube. CONTRAST: 15 mL Omnipaque intraenteric. ANTIBIOTICS: None. ADDITIONAL MEDICATIONS: None. FLUOROSCOPIC TIME: 0.1 minutes. RADIATION DOSE: Air Kerma: 0.81 mGy. COMPLICATIONS: No immediate complications. UNIVERSAL PROTOCOL: The operative site was marked and any prior imaging was reviewed. Required items including blood products, implants, devices and special equipment was made available. Patient identity was confirmed either verbally, with demographic information, hospital assigned identification or other identification markers. A timeout was performed immediately prior to the procedure. PROCEDURE/FINDINGS:   images obtained which shows a gastrojejunostomy tube in place. The tube was unclogged using warm water and an Amplatz wire. Contrast was injected which showed both the gastric and jejunal lumens are patent. The tube was flushed with normal saline.     IMPRESSION:  Successful unclogging of a newly exchange gastrojejunostomy tube. DO Alana GOLD Complete    Result Date: 10/26/2020  [id number]^DRE RAINEY^FIONA^SHANNAN    [facility],[place] [facility] [address] [place] [id number]  Name: BERT BARAJAS MRN: [id number] : 1960 Study Date: 10/26/2020 07:12 AM Age: 60 yrs Gender: Male Patient Location: [facility] Reason For Study: Shock Ordering Physician: FIONA MENDEZ Referring Physician: TEODORA LAWTON Performed By: Josue Yanez  BSA: 1.9 m2 Height: 72 in Weight: 162 lb HR: 74 BP: 113/57 mmHg _____________________________________________________________________________ __   Procedure Complete Portable Echo Adult. _____________________________________________________________________________ __   Interpretation Summary Global and regional left ventricular function is normal with an EF of 55-60%. Right ventricular function, chamber size, wall motion, and thickness are normal. Pulmonary artery systolic pressure cannot be assessed. Dilation of the inferior vena cava is present with abnormal respiratory variation in diameter. No pericardial effusion is present. A left pleural effusion is present. _____________________________________________________________________________ __   Left Ventricle Global and regional left ventricular function is normal with an EF of 55-60%. Left ventricular size is normal. Borderline concentric wall thickening consistent with left ventricular hypertrophy is present. Left ventricular diastolic function is normal. No regional wall motion abnormalities are seen.  Right Ventricle Right ventricular  function, chamber size, wall motion, and thickness are normal.  Atria The atria cannot be assessed.  Mitral Valve Chordal SURI noted. Trace to mild mitral insufficiency is present.   Aortic Valve Aortic valve is normal in structure and function. Trileaflet aortic sclerosis without stenosis.  Tricuspid Valve The tricuspid valve is normal. Trace tricuspid insufficiency is present. Pulmonary artery systolic pressure cannot be assessed.  Pulmonic Valve The pulmonic valve is normal. Trace pulmonic insufficiency is present.  Vessels The pulmonary artery and bifurcation cannot be assessed. Sinuses of Valsalva 3.7 cm. Ascending aorta 2.5 cm. Aortic arch 3.9 cm. Dilation of the inferior vena cava is present with abnormal respiratory variation in diameter.  Pericardium No pericardial effusion is present.   Miscellaneous A left pleural effusion is present. _____________________________________________________________________________ __ MMode/2D Measurements & Calculations IVSd: 1.4 cm  LVIDd: 4.4 cm LVIDs: 3.3 cm LVPWd: 1.1 cm FS: 26.2 % LV mass(C)d: 193.8 grams LV mass(C)dI: 99.5 grams/m2 asc Aorta Diam: 3.5 cm RWT: 0.48 TAPSE: 1.5 cm   Doppler Measurements & Calculations MV E max brandie: 84.8 cm/sec MV A max brandie: 58.5 cm/sec MV E/A: 1.5 MV dec slope: 502.2 cm/sec2 MV dec time: 0.17 sec Medial E/e': 10.3   _____________________________________________________________________________ __   Report approved by: Gerald Sosa 10/26/2020 09:26 AM      Us Lower Extremity Venous Bilateral Port    Result Date: 10/27/2020  EXAM: DOPPLER VENOUS ULTRASOUND OF BILATERAL LOWER EXTREMITIES, 10/27/2020 11:39 AM. HISTORY: drop in platelets. COMPARISON:  Ultrasound 11/26/2016. TECHNIQUE:  Gray-scale evaluation with compression, spectral flow and color Doppler assessment of the deep venous system of both legs from groin to knee, and then at the ankles. FINDINGS: In both lower extremities, the common femoral, femoral, popliteal and posterior  tibial veins demonstrate normal compressibility and blood flow.     IMPRESSION: No evidence of deep venous thrombosis in either lower extremity. I have personally reviewed the examination and initial interpretation and I agree with the findings. ADRI RANGEL MD    Us Upper Extremity Venous Duplex Bilat    Result Date: 10/27/2020  EXAM: DOPPLER VENOUS ULTRASOUND OF BILATERAL UPPER EXTREMITIES, 10/27/2020 11:38 AM HISTORY: drop in platelets. COMPARISON: Ultrasound 12/2/2016. TECHNIQUE:  Gray-scale evaluation with compression, spectral flow, and color Doppler assessment of the deep venous system of both upper extremities. FINDINGS: Normal blood flow and waveforms are demonstrated in the internal jugular, innominate, subclavian, and axillary veins bilaterally. There is normal compressibility of the brachial, basilic and cephalic veins bilaterally.     IMPRESSION: No evidence of deep venous thrombosis in either upper extremity. I have personally reviewed the examination and initial interpretation and I agree with the findings. ADRI RANGEL MD    Xr Chest Port 1 View    Result Date: 10/25/2020  Exam: XR CHEST PORT 1 VW, 10/25/2020 8:21 PM Indication: Please assess ETT repositioning Comparison: Chest x-ray 10/25/2020. Findings: Portable single view of the chest. Tip of the endotracheal tube projects over the midthoracic trachea. Tip of the enteric gastric projects over the proximal gastric body. Low lung volumes with grossly unchanged perihilar and bibasilar opacity. Trace left-sided pleural effusion. Pulmonary vascularity is indistinct.     Impression: 1. Tip of the endotracheal tube projects over the midthoracic trachea. 2. Low lung volumes with perihilar and streaky bibasilar opacity representing edema, atelectasis, and/or infection. Small left-sided pleural effusion. I have personally reviewed the examination and initial interpretation and I agree with the findings. ALEJO THORPE MD    Xr Chest Port 1  View    Result Date: 10/25/2020  XR CHEST PORT 1 VW10/25/2020 1:27 PM INDICATION: Recurrent aspiration pneumonia COMPARISON:  Same-day CT and plain film films done yesterday FINDINGS: AP view of the chest. Endotracheal tube tip approximately 9 cm from the marcella. Low lung volumes. Cardiac mediastinal silhouette is unchanged when compared with prior exam. Cardiac resuscitation paddles. Streaky bibasilar opacities. No pneumothorax or pleural effusion.     IMPRESSION: Endotracheal tube remains 9 cm from the marcella. Low lung volumes with streaky bibasilar and perihilar opacities, likely atelectasis. I have personally reviewed the examination and initial interpretation and I agree with the findings. KADE KYLE MD    Xr Chest Port 1 View    Result Date: 10/25/2020  CHEST ONE VIEW  10/25/2020 7:57 AM HISTORY: Post intubation. COMPARISON: September 25, 2020     IMPRESSION: Endotracheal tube tip almost 10 cm above the marcella, consider repositioning. Increased right base infiltrate. Left lung clear. BERT ESPINOZA MD    Xr Abdomen Port 1 View    Result Date: 10/25/2020  Exam: XR ABDOMEN PORT 1 VW, 10/25/2020 8:08 PM Indication: OG tube retracted Comparison: Abdominal x-ray 10/25/2020 8:04 PM. Findings: AP portable single view of the abdomen. Tip of the enteric gastric tube projects over the distal gastric body. Gastrojejunostomy tube. Nonobstructive bowel gas pattern. No pneumatosis or portal venous gas. Streaky left basilar opacity, atelectasis versus infection.     Impression: Tip of the enteric gastric tube now projects over the distal gastric body. I have personally reviewed the examination and initial interpretation and I agree with the findings. ALEJO THORPE MD    Xr Abdomen Port 1 View    Result Date: 10/25/2020  Exam: XR ABDOMEN PORT 1 VW, 10/25/2020 8:03 PM Indication: OG verification Comparison: Same day abdominal x-ray. Findings: AP portable single view of the abdomen. Tip of the enteric gastric  tube projects over the distal stomach. Gastrojejunostomy tube with tip in the proximal jejunum. Nonobstructive bowel gas pattern. Contrast in the colon.     Impression: Tip of the enteric gastric tube projects over the distal stomach. I have personally reviewed the examination and initial interpretation and I agree with the findings. ALEJO THORPE MD    Ct Chest (pe) Abdomen Pelvis W Contrast    Result Date: 10/25/2020  CT CHEST PE, ABDOMEN AND PELVIS WITH CONTRAST 10/25/2020 8:30 AM HISTORY: Fever, respiratory distress, G-tube, septic shock, PE. COMPARISON: Chest CT from August 28, 2020, abdomen and pelvis CT from August 13, 2020. TECHNIQUE: Volumetric helical acquisition of CT images from the lung apices through the symphysis pubis after the uneventful administration of 80 mL Isovue-370 intravenous contrast. Radiation dose for this scan was reduced using automated exposure control, adjustment of the mA and/or kV according to patient size, or iterative reconstruction technique. FINDINGS: Chest: There is no pulmonary embolism. Consolidation in both lower lobes, right worse than left, concerning for pneumonia. Endotracheal tube high in position, consider advancement. The heart is not enlarged.  Thoracic aorta is atherosclerotic without evidence of dissection or aneurysm. There is no pleural or pericardial effusion. There is no pneumothorax. Abdomen and pelvis: The liver, bilateral kidneys and adrenal glands, and spleen demonstrate no worrisome focal lesion. Pancreas tail cyst is unchanged. GJ tube noted with tip in the proximal jejunum. There is no free fluid in the abdomen or pelvis. No free air in the abdomen. Bone windows reveal no destructive lesions.  There are no abdominal or pelvic lymph nodes that are abnormal by size criteria. There are no dilated loops of small bowel or colon.     IMPRESSION: 1. Consolidation in both lower lobes right worse than left, concerning for pneumonia. 2. No pulmonary  embolism demonstrated. 3. No acute process demonstrated in the abdomen and pelvis. BERT ESPINOZA MD

## 2020-10-29 NOTE — PROGRESS NOTES
10/29/20 1527   Quick Adds   Type of Visit Initial Occupational Therapy Evaluation   Living Environment   People in home parent(s);other (see comments)  (per chart and RN, mother and live-in PCA)   Current Living Arrangements house   Transportation Anticipated health plan transportation  (per chart)   Living Environment Comments Pt unable to verbalize living situation; per RN and chart review, pt lives with mother and live-in PCA who assists 12-hr/day. Pt uses w/c for mobility at baseline, receives assist for all cares.    Self-Care   Usual Activity Tolerance fair   Current Activity Tolerance poor   Regular Exercise No   Equipment Currently Used at Home wheelchair, power;wheelchair, manual;shower chair;grab bar, toilet;glucometer;hospital bed   Activity/Exercise/Self-Care Comment Per chart and RN, pt dependent for all cares at baseline. Uses w/c for functional mobility, unclear how pt transfers at baseline   Disability/Function   Hearing Difficulty or Deaf no   Wear Glasses or Blind no   Concentrating, Remembering or Making Decisions Difficulty yes   Difficulty Communicating yes   Communication difficulty speaking   Difficulty Eating/Swallowing yes   Walking or Climbing Stairs Difficulty yes   Walking or Climbing Stairs ambulation difficulty, dependent;transferring difficulty, dependent;stair climbing difficulty, dependent   Mobility Management w/c   Dressing/Bathing Difficulty yes   Dressing/Bathing bathing difficulty, dependent;dressing difficulty, dependent   Dressing/Bathing Management receives assist from PCA and mother   Toileting yes   Toileting Management assist from PCA and mother   Toileting Assistance toileting difficulty, dependent   Doing Errands Independently Difficulty (such as shopping) yes   Errands Management caretaker completes   Change in Functional Status Since Onset of Current Illness/Injury   (unclear, however pt may likely be near functional baseline)   General Information   Onset of  Illness/Injury or Date of Surgery 10/25/20   Referring Physician Fam Farnsworth MD   Patient/Family Therapy Goal Statement (OT) unable to state   Additional Occupational Profile Info/Pertinent History of Current Problem  Keyon Farias is a 60 year old male who was transferred from [facility] on 10/25/2020 with septic shock in the setting of likely aspiration pneumonia. 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    Performance Patterns (Routines, Roles, Habits) Pt not able to provide verbal/nonverbal communication, per chart and RN report, pt receives assist from mother and PCA.    Existing Precautions/Restrictions fall;oxygen therapy device and L/min  (30% FiO2)   Limitations/Impairments safety/cognitive   Left Upper Extremity (Weight-bearing Status) full weight-bearing (FWB)   Right Upper Extremity (Weight-bearing Status) full weight-bearing (FWB)   Left Lower Extremity (Weight-bearing Status) full weight-bearing (FWB)   Right Lower Extremity (Weight-bearing Status) full weight-bearing (FWB)   Heart Disease Risk Factors Medical history;Lack of physical activity   General Observations and Info Activity:    Occupational Profile   Environmental Supports and Barriers (Occupational Profile)   (mother and PCA and part time nurses care for pt)   Cognitive Status Examination   Orientation Status   (difficult to assess)   Affect/Mental Status (Cognitive) low arousal/lethargic   Follows Commands 0-24% accuracy;follows one-step commands   Cognitive Status Comments pt following < 25% of simple commands, able to track intermittetly and open eyes with cuing/name   Visual Perception   Visual Impairment/Limitations unable/difficult to assess   Range of Motion Comprehensive   Comment, General Range of  Motion L UE WNL; R UE ROM deficits, shoulder PROM ~ 90 degrees, contractures in R wrist and digits   Strength Comprehensive (MMT)   Comment, General Manual Muscle Testing (MMT) Assessment difficult to assess due to command following, however grossly deconditioned   Instrumental Activities of Daily Living (IADL)   IADL Comments mother and PCA complete all IADLs    Clinical Impression   Criteria for Skilled Therapeutic Interventions Met (OT) yes   OT Diagnosis impaired participate in self cares   OT Problem List-Impairments impacting ADL activity tolerance impaired;cognition;communication;strength   Assessment of Occupational Performance 1-3 Performance Deficits   Identified Performance Deficits functional mobility, g/h, dressing   Planned Therapy Interventions (OT) ADL retraining;progressive activity/exercise;ROM;strengthening;home program guidelines   Clinical Decision Making Complexity (OT) low complexity   Therapy Frequency (OT) 2x/week   Predicted Duration of Therapy 1-2 weeks   Anticipated Equipment Needs Upon Discharge (OT)   (TBD)   Risks and Benefits of Treatment have been explained. Yes   Patient, Family & other staff in agreement with plan of care Yes   OT Discharge Planning    OT Discharge Recommendation (DC Rec) Home with assist;Long term care facility   OT Rationale for DC Rec Pt dependent for all mobility and self care needs, will require 24/7 support. If this level of care will not be appropriately obtained in home, will require LTC setting.   Total Evaluation Time (Minutes)   Total Evaluation Time (Minutes) 5

## 2020-10-30 LAB
ALBUMIN SERPL-MCNC: 2.1 G/DL (ref 3.4–5)
ALP SERPL-CCNC: 144 U/L (ref 40–150)
ALT SERPL W P-5'-P-CCNC: 68 U/L (ref 0–70)
ANION GAP SERPL CALCULATED.3IONS-SCNC: 4 MMOL/L (ref 3–14)
AST SERPL W P-5'-P-CCNC: 78 U/L (ref 0–45)
AST SERPL W P-5'-P-CCNC: ABNORMAL U/L (ref 0–45)
BASOPHILS # BLD AUTO: 0 10E9/L (ref 0–0.2)
BASOPHILS NFR BLD AUTO: 0.1 %
BILIRUB SERPL-MCNC: 0.7 MG/DL (ref 0.2–1.3)
BUN SERPL-MCNC: 20 MG/DL (ref 7–30)
CALCIUM SERPL-MCNC: 7.3 MG/DL (ref 8.5–10.1)
CHLORIDE SERPL-SCNC: 115 MMOL/L (ref 94–109)
CO2 SERPL-SCNC: 26 MMOL/L (ref 20–32)
CREAT SERPL-MCNC: 0.77 MG/DL (ref 0.66–1.25)
DIFFERENTIAL METHOD BLD: ABNORMAL
EOSINOPHIL # BLD AUTO: 0 10E9/L (ref 0–0.7)
EOSINOPHIL NFR BLD AUTO: 0.1 %
ERYTHROCYTE [DISTWIDTH] IN BLOOD BY AUTOMATED COUNT: 15.4 % (ref 10–15)
GFR SERPL CREATININE-BSD FRML MDRD: >90 ML/MIN/{1.73_M2}
GLUCOSE BLDC GLUCOMTR-MCNC: 101 MG/DL (ref 70–99)
GLUCOSE BLDC GLUCOMTR-MCNC: 110 MG/DL (ref 70–99)
GLUCOSE BLDC GLUCOMTR-MCNC: 125 MG/DL (ref 70–99)
GLUCOSE BLDC GLUCOMTR-MCNC: 125 MG/DL (ref 70–99)
GLUCOSE BLDC GLUCOMTR-MCNC: 147 MG/DL (ref 70–99)
GLUCOSE BLDC GLUCOMTR-MCNC: 157 MG/DL (ref 70–99)
GLUCOSE BLDC GLUCOMTR-MCNC: 94 MG/DL (ref 70–99)
GLUCOSE SERPL-MCNC: 145 MG/DL (ref 70–99)
HCT VFR BLD AUTO: 28.4 % (ref 40–53)
HGB BLD-MCNC: 8.6 G/DL (ref 13.3–17.7)
IMM GRANULOCYTES # BLD: 0.1 10E9/L (ref 0–0.4)
IMM GRANULOCYTES NFR BLD: 0.9 %
LYMPHOCYTES # BLD AUTO: 1.4 10E9/L (ref 0.8–5.3)
LYMPHOCYTES NFR BLD AUTO: 15.3 %
MCH RBC QN AUTO: 28.9 PG (ref 26.5–33)
MCHC RBC AUTO-ENTMCNC: 30.3 G/DL (ref 31.5–36.5)
MCV RBC AUTO: 95 FL (ref 78–100)
MONOCYTES # BLD AUTO: 0.6 10E9/L (ref 0–1.3)
MONOCYTES NFR BLD AUTO: 6.3 %
NEUTROPHILS # BLD AUTO: 7.2 10E9/L (ref 1.6–8.3)
NEUTROPHILS NFR BLD AUTO: 77.3 %
NRBC # BLD AUTO: 0 10*3/UL
NRBC BLD AUTO-RTO: 0 /100
PLATELET # BLD AUTO: 105 10E9/L (ref 150–450)
PLATELET # BLD EST: ABNORMAL 10*3/UL
POTASSIUM SERPL-SCNC: 3.9 MMOL/L (ref 3.4–5.3)
PROT SERPL-MCNC: 5.8 G/DL (ref 6.8–8.8)
RBC # BLD AUTO: 2.98 10E12/L (ref 4.4–5.9)
SODIUM SERPL-SCNC: 144 MMOL/L (ref 133–144)
SODIUM SERPL-SCNC: 146 MMOL/L (ref 133–144)
VANCOMYCIN SERPL-MCNC: 17.6 MG/L
WBC # BLD AUTO: 9.3 10E9/L (ref 4–11)

## 2020-10-30 PROCEDURE — 272N000078 HC NUTRITION PRODUCT INTERMEDIATE LITER

## 2020-10-30 PROCEDURE — 99233 SBSQ HOSP IP/OBS HIGH 50: CPT | Performed by: PEDIATRICS

## 2020-10-30 PROCEDURE — 120N000002 HC R&B MED SURG/OB UMMC

## 2020-10-30 PROCEDURE — 82040 ASSAY OF SERUM ALBUMIN: CPT

## 2020-10-30 PROCEDURE — 84450 TRANSFERASE (AST) (SGOT): CPT | Performed by: INTERNAL MEDICINE

## 2020-10-30 PROCEDURE — 36415 COLL VENOUS BLD VENIPUNCTURE: CPT | Performed by: PHYSICIAN ASSISTANT

## 2020-10-30 PROCEDURE — 999N001017 HC STATISTIC GLUCOSE BY METER IP

## 2020-10-30 PROCEDURE — 250N000012 HC RX MED GY IP 250 OP 636 PS 637: Performed by: NURSE PRACTITIONER

## 2020-10-30 PROCEDURE — 84075 ASSAY ALKALINE PHOSPHATASE: CPT

## 2020-10-30 PROCEDURE — 84155 ASSAY OF PROTEIN SERUM: CPT

## 2020-10-30 PROCEDURE — 84295 ASSAY OF SERUM SODIUM: CPT | Performed by: INTERNAL MEDICINE

## 2020-10-30 PROCEDURE — 36415 COLL VENOUS BLD VENIPUNCTURE: CPT | Performed by: INTERNAL MEDICINE

## 2020-10-30 PROCEDURE — 99221 1ST HOSP IP/OBS SF/LOW 40: CPT | Performed by: NURSE PRACTITIONER

## 2020-10-30 PROCEDURE — C9113 INJ PANTOPRAZOLE SODIUM, VIA: HCPCS | Performed by: STUDENT IN AN ORGANIZED HEALTH CARE EDUCATION/TRAINING PROGRAM

## 2020-10-30 PROCEDURE — 250N000013 HC RX MED GY IP 250 OP 250 PS 637: Performed by: STUDENT IN AN ORGANIZED HEALTH CARE EDUCATION/TRAINING PROGRAM

## 2020-10-30 PROCEDURE — 85025 COMPLETE CBC W/AUTO DIFF WBC: CPT | Performed by: INTERNAL MEDICINE

## 2020-10-30 PROCEDURE — 80048 BASIC METABOLIC PNL TOTAL CA: CPT

## 2020-10-30 PROCEDURE — 250N000011 HC RX IP 250 OP 636: Performed by: PEDIATRICS

## 2020-10-30 PROCEDURE — 250N000011 HC RX IP 250 OP 636: Performed by: INTERNAL MEDICINE

## 2020-10-30 PROCEDURE — 250N000011 HC RX IP 250 OP 636: Performed by: STUDENT IN AN ORGANIZED HEALTH CARE EDUCATION/TRAINING PROGRAM

## 2020-10-30 PROCEDURE — 84295 ASSAY OF SERUM SODIUM: CPT | Performed by: PHYSICIAN ASSISTANT

## 2020-10-30 PROCEDURE — 258N000003 HC RX IP 258 OP 636: Performed by: INTERNAL MEDICINE

## 2020-10-30 PROCEDURE — 80202 ASSAY OF VANCOMYCIN: CPT | Performed by: INTERNAL MEDICINE

## 2020-10-30 PROCEDURE — 82247 BILIRUBIN TOTAL: CPT

## 2020-10-30 PROCEDURE — 84460 ALANINE AMINO (ALT) (SGPT): CPT

## 2020-10-30 RX ORDER — SALIVA STIMULANT COMB. NO.3
2 SPRAY, NON-AEROSOL (ML) MUCOUS MEMBRANE 4 TIMES DAILY PRN
Status: DISCONTINUED | OUTPATIENT
Start: 2020-10-30 | End: 2020-11-06 | Stop reason: HOSPADM

## 2020-10-30 RX ORDER — PIPERACILLIN SODIUM, TAZOBACTAM SODIUM 4; .5 G/20ML; G/20ML
4.5 INJECTION, POWDER, LYOPHILIZED, FOR SOLUTION INTRAVENOUS EVERY 6 HOURS
Status: COMPLETED | OUTPATIENT
Start: 2020-10-30 | End: 2020-11-01

## 2020-10-30 RX ADMIN — HYDROCORTISONE SODIUM SUCCINATE 50 MG: 100 INJECTION, POWDER, FOR SOLUTION INTRAMUSCULAR; INTRAVENOUS at 08:24

## 2020-10-30 RX ADMIN — METOCLOPRAMIDE HYDROCHLORIDE 10 MG: 5 SOLUTION ORAL at 21:23

## 2020-10-30 RX ADMIN — VANCOMYCIN HYDROCHLORIDE 1500 MG: 10 INJECTION, POWDER, LYOPHILIZED, FOR SOLUTION INTRAVENOUS at 21:00

## 2020-10-30 RX ADMIN — PIPERACILLIN SODIUM AND TAZOBACTAM SODIUM 4.5 G: 4; .5 INJECTION, POWDER, LYOPHILIZED, FOR SOLUTION INTRAVENOUS at 13:53

## 2020-10-30 RX ADMIN — PIPERACILLIN SODIUM AND TAZOBACTAM SODIUM 4.5 G: 4; .5 INJECTION, POWDER, LYOPHILIZED, FOR SOLUTION INTRAVENOUS at 08:23

## 2020-10-30 RX ADMIN — INSULIN GLARGINE 20 UNITS: 100 INJECTION, SOLUTION SUBCUTANEOUS at 08:24

## 2020-10-30 RX ADMIN — HYDROCORTISONE SODIUM SUCCINATE 50 MG: 100 INJECTION, POWDER, FOR SOLUTION INTRAMUSCULAR; INTRAVENOUS at 20:59

## 2020-10-30 RX ADMIN — METOCLOPRAMIDE HYDROCHLORIDE 10 MG: 5 SOLUTION ORAL at 12:19

## 2020-10-30 RX ADMIN — MULTIVITAMIN 15 ML: LIQUID ORAL at 08:24

## 2020-10-30 RX ADMIN — MICONAZOLE NITRATE: 20 POWDER TOPICAL at 21:24

## 2020-10-30 RX ADMIN — CARBAMAZEPINE 150 MG: 100 SUSPENSION ORAL at 18:22

## 2020-10-30 RX ADMIN — ENOXAPARIN SODIUM 40 MG: 40 INJECTION SUBCUTANEOUS at 05:24

## 2020-10-30 RX ADMIN — HYDROCORTISONE SODIUM SUCCINATE 50 MG: 100 INJECTION, POWDER, FOR SOLUTION INTRAMUSCULAR; INTRAVENOUS at 03:16

## 2020-10-30 RX ADMIN — CIPROFLOXACIN 400 MG: 2 INJECTION, SOLUTION INTRAVENOUS at 15:43

## 2020-10-30 RX ADMIN — CARBAMAZEPINE 150 MG: 100 SUSPENSION ORAL at 12:19

## 2020-10-30 RX ADMIN — PANTOPRAZOLE SODIUM 40 MG: 40 INJECTION, POWDER, FOR SOLUTION INTRAVENOUS at 08:24

## 2020-10-30 RX ADMIN — INSULIN ASPART 1 UNITS: 100 INJECTION, SOLUTION INTRAVENOUS; SUBCUTANEOUS at 16:41

## 2020-10-30 RX ADMIN — VANCOMYCIN HYDROCHLORIDE 1500 MG: 10 INJECTION, POWDER, LYOPHILIZED, FOR SOLUTION INTRAVENOUS at 03:13

## 2020-10-30 RX ADMIN — CARBAMAZEPINE 150 MG: 100 SUSPENSION ORAL at 08:23

## 2020-10-30 RX ADMIN — MICONAZOLE NITRATE: 20 POWDER TOPICAL at 08:23

## 2020-10-30 RX ADMIN — CALCIUM CARBONATE 1250 MG: 1250 SUSPENSION ORAL at 08:25

## 2020-10-30 RX ADMIN — LEVOTHYROXINE SODIUM 150 MCG: 0.15 TABLET ORAL at 08:23

## 2020-10-30 RX ADMIN — CALCIUM CARBONATE 1250 MG: 1250 SUSPENSION ORAL at 18:22

## 2020-10-30 RX ADMIN — PIPERACILLIN SODIUM AND TAZOBACTAM SODIUM 4.5 G: 4; .5 INJECTION, POWDER, LYOPHILIZED, FOR SOLUTION INTRAVENOUS at 21:00

## 2020-10-30 RX ADMIN — CALCIUM CARBONATE 1250 MG: 1250 SUSPENSION ORAL at 12:19

## 2020-10-30 RX ADMIN — SODIUM BICARBONATE 650 MG TABLET 650 MG: at 08:23

## 2020-10-30 RX ADMIN — CIPROFLOXACIN 400 MG: 2 INJECTION, SOLUTION INTRAVENOUS at 08:40

## 2020-10-30 RX ADMIN — HYDROCORTISONE SODIUM SUCCINATE 50 MG: 100 INJECTION, POWDER, FOR SOLUTION INTRAMUSCULAR; INTRAVENOUS at 13:53

## 2020-10-30 RX ADMIN — METOCLOPRAMIDE HYDROCHLORIDE 10 MG: 5 SOLUTION ORAL at 08:23

## 2020-10-30 RX ADMIN — Medication 50 MCG: at 08:24

## 2020-10-30 RX ADMIN — METOCLOPRAMIDE HYDROCHLORIDE 10 MG: 5 SOLUTION ORAL at 16:41

## 2020-10-30 RX ADMIN — CARBAMAZEPINE 150 MG: 100 SUSPENSION ORAL at 03:19

## 2020-10-30 ASSESSMENT — MIFFLIN-ST. JEOR: SCORE: 1673.13

## 2020-10-30 ASSESSMENT — ACTIVITIES OF DAILY LIVING (ADL)
ADLS_ACUITY_SCORE: 40

## 2020-10-30 NOTE — PHARMACY-VANCOMYCIN DOSING SERVICE
Pharmacy Vancomycin Note  Date of Service 2020  Patient's  1960   60 year old, male    Indication: Aspiration Pneumonia  Goal Trough Level: 15-20 mg/L  Day of Therapy: 4  Current Vancomycin regimen:  1500 mg IV q18h    Current estimated CrCl = Estimated Creatinine Clearance: 109.3 mL/min (based on SCr of 0.77 mg/dL).    Creatinine for last 3 days  10/28/2020:  4:02 AM Creatinine 0.82 mg/dL  10/29/2020:  4:13 AM Creatinine 0.88 mg/dL  10/30/2020:  5:53 AM Creatinine 0.77 mg/dL    Recent Vancomycin Levels (past 3 days)  10/30/2020: 12:26 AM Vancomycin Level 17.6 mg/L    Vancomycin IV Administrations (past 72 hours)                   vancomycin 1500 mg in 0.9% NaCl 250 ml intermittent infusion 1,500 mg (mg) 1,500 mg Given 10/30/20 0313     1,500 mg Given 10/29/20 0837     1,500 mg Given 10/28/20 1310     1,500 mg Given 10/27/20 1903                Nephrotoxins and other renal medications (From now, onward)    Start     Dose/Rate Route Frequency Ordered Stop    10/30/20 0700  piperacillin-tazobactam (ZOSYN) 4.5 g vial to attach to  mL bag      4.5 g  over 30 Minutes Intravenous EVERY 6 HOURS 10/30/20 0659      10/27/20 1900  vancomycin 1500 mg in 0.9% NaCl 250 ml intermittent infusion 1,500 mg      1,500 mg  over 90 Minutes Intravenous EVERY 18 HOURS 10/27/20 1756               Contrast Orders - past 72 hours (72h ago, onward)    None          Interpretation of levels and current regimen:  Trough level is  Therapeutic    Has serum creatinine changed > 50% in last 72 hours: No    Urine output:  good urine output    Renal Function: Stable    Plan:  1.  Continue Current Dose  2.  Pharmacy will check trough levels as appropriate in 3-5 Days.    3. Serum creatinine levels will be ordered daily for the first week of therapy and at least twice weekly for subsequent weeks.      Jeff West, PharmD        .

## 2020-10-30 NOTE — PROGRESS NOTES
D/I: Pt is 60 yr male transferred from  at 2350 with seotic shock and aspiration pneumonia, vitally stable on High flow nasal cannula. Hx of of remote TBI c/b spastic hemiplegia, recurrent aspiration pneumonia, seizure disorder, history of DVT, panhypopituitarism, history of V-fib, and GERD. septic shock. No central lines in place and Lerma is still in place. GJ tube in place with G to gravity and J for feeding.   Continue to monitor pt and follow plan of care.

## 2020-10-30 NOTE — PROGRESS NOTES
Pt. trasferred to 5B, room 27 at 2350. Vitals stable and no belongings in hospital. High flow nasal cannula sent up with patient. Chart and medications sent with patient. No central lines in place and Lerma is still in place. Pt.'s Mom updated via phone call.

## 2020-10-30 NOTE — PROGRESS NOTES
"[facility]     Medicine Progress Note - Hospitalist Service, Gold 9       Date of Admission:  10/25/2020  Assessment & Plan         60 year old male with history of remote TBI c/b spastic hemiplegia, recurrent aspiration pneumonia, seizure disorder, history of DVT, panhypopituitarism, history of V-fib, GERD admitted on 10/25/2020 with septic shock.     #Septic shock 2/2 aspiration pneumonia (POA, resolved)  #Recurrent aspiration pneumonia (POA)  Presenting with fevers to 102.7 at home and respiratory distress requiring intubation and initial bp 56/39 requiring 3 pressors and stress dose steroids. CXR 10/25/20 with tip of ET tube in proper position, low lung volumes . CT PE protocol C/A/P on 10/25 with no evidence of PE and bilateral consolidation R>L, wc/w pneumonia and unremarkable abdomen and pelvis. Patient was initiated on empiric Zosyn and vancomycin for septic shock 2/2 aspiration pneumonia. Sputum cultures (10/25/20) + light growth of each K. Pneumonia, P. Aeruginosa and MRSA.   - blood cultures 10/25, NGTD  - FiO2 30%, wean as tolerated  - palliative care consult   - Current Abx: Vanco (started 10/25), Cipro (started 10/29), previously on Zosyn (10/25-10/29)  - unhold PTA prn mucomyst and albuterol inhalers     Thrombocytopenia, improving (POA)  Acute drop 220 (on admission) > 94 (10/27). Potentially in the setting of consumption for sepsis. 4T score is 4.  - 4 ext DVT US (negative) on 10/27/2020     Seizure disorder (POA)  No clear seizure activity  - have a low threshold to discuss with neurology for consideration of EEG if mental status is not felt to be BL  -Continue PTA carbamazepine 150mg q6h.  -Continue PTA brivaracetam 100mg BID.     Polyuria (POA, resolved)  UOP 9.5L on 10/28 and today is 1.9L--much improved. Per ICU \"We administered ddavp 24mcg +  D5 1/2 N/S @ 270 ml/L. Nephrology not officially consulted but curbsided.\" Cr 0.8.  - trend weight and " "I&O     Hypernatremia  (POA, resolved)  Na today 149 and is much improved (up to 168)   - trend BMP     Severe dysphagia (POA)  PEG tube status   TF-dependent  - consider SLP evalation if mental status improves  - continue TF (currently Nutren 1.5 at goal rate of 55ml/hr), appreciate RD following   - Free water flushes 100ml q4h (total 600ml/24hr)     Hyperglycemia  HbA1C 5.9 on 8/12/20  - continue insulin glargine 20 units daily  - continue medium ISS  - hypoglycemia protocol       History of remoteTBI with spastic hemiplegia  At BL patient is fully dependent for cares and has been OOB to chair w/ nidia lift, no clear purposeful movements and no speech. Reportedly per pt's mother he is able to say \"yes\" \"no\".   - continue to monitor  - follow up with palliative care recommendations     Panhypopituitarism  - continue PTA levothyroxine 150mcg daily   - continue steroid mng with hydrocortisone 50mg q6h     Coccyx, right mid-back and right groin pressure ulcers  - WOCN input appreciated     History of DVT  - continue lovenox DVT ptx     ------------------------------------------------------------------------------  Resolved medical issues:  ALMAS  Toxic encephalopathy   Septic shock  Leukocytosis- occurred in the setting of sepsis/aspiration pneumonia               Diet: NPO for Medical/Clinical Reasons Except for: Other; Specify: Can give meds through J tube  Adult Formula Drip Feeding: Continuous Nutren 1.5; Jejunostomy; Goal Rate: 55; mL/hr; Medication - Feeding Tube Flush Frequency: At least 15-30 mL water before and after medication administration and with tube clogging; Amount to Send (Nutrition u...    DVT Prophylaxis: Enoxaparin (Lovenox) SQ  Lerma Catheter: in place, indication: Strict 1-2 Hour I&O  Code Status: Full Code           Disposition Plan   Expected discharge: 4 - 7 days, recommended to prior living arrangement once O2 use less than 4 liters/minute.  Entered: Xavier Dee MD 10/30/2020, 9:07 AM   " "  greater than 35 min spent total providing care, greater than 18 minutes floor time spent coordinating care, giving education and anticipatory guidance regarding care plan above    The patient's care was discussed with the Bedside Nurse, Care Coordinator/, Patient and Patient's Family.    Xavier Dee MD  Hospitalist Service, [address]   Contact information available via [facility] Paging/Directory  Please see sign in/sign out for up to date coverage information  ______________________________________________________________________    Interval History     Overnight no acute events  Patient at baseline nonverbal status  Mother present bedside and reviewed hospital stay and past medical history  She corroborates history in the HPI  Says that the patient baseline no longer able to help with transfers states just got a new power chair, other medical equipment to help with standing, she notes later that he has had issues with handling secretions coughing dysphagia and not able to eat for some time.  She recalls that to 3 years ago she \"taught\" the patient to be able to eat on his own, she is remorseful that this is no longer an ability that he has, states disappointment that seems he is no longer able to swallow anything safely    There seems to be limited understanding of the patient's frailty debility weakness, and overall poor prognosis and likelihood of recurrent aspiration events.  Reviewed the likelihood with mom that he is likely to have recurrent episodes such as this, is unlikely he could recover strength such that he be able to eat again.  She seem disheartened at this news and wanted to begin therapies immediately to try to get what recovery he could      Data reviewed today: I reviewed all medications, new labs and imaging results over the last 24 hours. I personally reviewed no images or EKG's today.    Physical Exam   Vital Signs: Temp: 96 "  F (35.6  C) Temp src: Axillary BP: (!) 141/49 Pulse: 52   Resp: 18 SpO2: 100 % O2 Device: High Flow Nasal Cannula (HFNC) Oxygen Delivery: 10 LPM  Weight: 162 lbs 14.72 oz  General: frail appearing man sitting up in bed  Head: NC, AT, unkept beard  Eye: symm gaze, anicteric sclerae  ENT: patent nares wo drainage/epistaxis, MMM  Pulm: mild tachypnea with relatively comfortable WOB on HFNC  CV: regular rate, normal rhythm  GI: soft, NTND, GT secure in place  Neuro: awake, alert, nonverbal baseline, not following commands, not answering y/n questions      Data   Recent Labs   Lab 10/30/20  1452 10/30/20  0553 10/30/20  0026 10/29/20  2028 10/29/20  1316 10/29/20  1316 10/29/20  1003 10/29/20  0413 10/29/20  0413 10/28/20  0402 10/28/20  0402 10/26/20  0400 10/26/20  0400 10/25/20  2100 10/25/20  2100 10/25/20  1345 10/25/20  0742   WBC  --  9.3  --   --   --   --   --   --  7.6  --  7.8   < > 17.0*  --   --  27.2* 23.1*   HGB  --  8.6*  --   --   --   --   --   --  9.0*  --  8.6*   < > 9.8*  --   --  11.7* 12.6*   MCV  --  95  --   --   --   --   --   --  97  --  92   < > 89  --   --  91 90   PLT  --  105*  --   --   --   --   --   --  133*  --  110*   < > 147*  --   --  224 220   INR  --   --   --   --   --   --   --   --   --   --   --   --  1.82*  --   --  1.86* 1.89*    146*  --  148*   < > 147* 149*   < > 154*   < > 147*   < > 136  --   --  135 132*   POTASSIUM  --  3.9  --   --   --  3.6 3.6  --  3.0*   < > 3.8   < > 3.3*   < >  --  4.1 3.5   CHLORIDE  --  115*  --   --   --   --   --   --  121*  --  117*   < > 106  --   --  102 96   CO2  --  26  --   --   --   --   --   --  28  --  25   < > 21  --   --  16* 22   BUN  --  20  --   --   --   --   --   --  14  --  18   < > 24  --   --  41* 50*   CR  --  0.77  --   --   --   --   --   --  0.88  --  0.82   < > 1.37*  --   --  2.49* 3.28*   ANIONGAP  --  4  --   --   --   --   --   --  5  --  5   < > 9  --   --  17* 14   STEVE  --  7.3*  --   --   --   --   --    --  7.8*  --  7.5*   < > 8.0*  --   --  7.3* 7.3*   GLC  --  145*  --   --   --   --   --   --  78  --  117*   < > 208*  --   --  219* 106*   ALBUMIN  --  2.1*  --   --   --   --   --   --  2.1*  --  1.8*   < > 1.8*  --   --  2.2* 2.1*   PROTTOTAL  --  5.8*  --   --   --   --   --   --  6.1*  --  5.5*   < > 5.5*  --   --  5.8* 5.7*   BILITOTAL  --  0.7  --   --   --   --   --   --  1.3  --  0.4   < > 0.7  --   --  1.2 0.9   ALKPHOS  --  144  --   --   --   --   --   --  134  --  73   < > 66  --   --  83 72   ALT  --  68  --   --   --   --   --   --  67  --  32   < > 24  --   --  15 15   AST  --  Unsatisfactory specimen - hemolyzed 78*  --   --   --   --   --  96*  --  48*   < > 52*  --   --  42 27   LIPASE  --   --   --   --   --   --   --   --   --   --   --   --   --   --   --   --  158   TROPI  --   --   --   --   --   --   --   --   --   --   --   --   --   --  0.047* 0.133* 0.095*    < > = values in this interval not displayed.     No results found for this or any previous visit (from the past 24 hour(s)).  Medications     dextrose       dextrose         Brivaracetam  100 mg Oral BID     calcium carbonate  1,250 mg Oral TID w/meals     carBAMazepine  150 mg Oral Q6H ALONDRA     ciprofloxacin  400 mg Intravenous Q8H     enoxaparin ANTICOAGULANT  40 mg Subcutaneous Q24H     fiber modular (NUTRISOURCE FIBER)  1 packet Per Feeding Tube Daily     hydrocortisone sodium succinate PF  50 mg Intravenous Q6H     insulin aspart  1-6 Units Subcutaneous Q4H     insulin glargine  20 Units Subcutaneous Daily     levothyroxine  150 mcg Oral QAM AC     metoclopramide  10 mg Oral 4x Daily AC & HS     miconazole   Topical BID     multivitamins w/minerals  15 mL Per Feeding Tube Daily     pantoprazole (PROTONIX) IV  40 mg Intravenous Daily     piperacillin-tazobactam  4.5 g Intravenous Q6H     sodium bicarbonate  650 mg Oral Daily     vancomycin (VANCOCIN) IV  1,500 mg Intravenous Q18H     cholecalciferol  50 mcg Oral Daily
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
## 2020-10-31 LAB
ALBUMIN SERPL-MCNC: 2.1 G/DL (ref 3.4–5)
ALBUMIN SERPL-MCNC: 2.2 G/DL (ref 3.4–5)
ALP SERPL-CCNC: 120 U/L (ref 40–150)
ALP SERPL-CCNC: 120 U/L (ref 40–150)
ALT SERPL W P-5'-P-CCNC: 50 U/L (ref 0–70)
ALT SERPL W P-5'-P-CCNC: 55 U/L (ref 0–70)
ANION GAP SERPL CALCULATED.3IONS-SCNC: 5 MMOL/L (ref 3–14)
ANION GAP SERPL CALCULATED.3IONS-SCNC: 6 MMOL/L (ref 3–14)
AST SERPL W P-5'-P-CCNC: 31 U/L (ref 0–45)
AST SERPL W P-5'-P-CCNC: 34 U/L (ref 0–45)
BACTERIA SPEC CULT: NO GROWTH
BACTERIA SPEC CULT: NO GROWTH
BASOPHILS # BLD AUTO: 0 10E9/L (ref 0–0.2)
BASOPHILS NFR BLD AUTO: 0.1 %
BILIRUB SERPL-MCNC: 0.4 MG/DL (ref 0.2–1.3)
BILIRUB SERPL-MCNC: 0.5 MG/DL (ref 0.2–1.3)
BUN SERPL-MCNC: 19 MG/DL (ref 7–30)
BUN SERPL-MCNC: 20 MG/DL (ref 7–30)
CALCIUM SERPL-MCNC: 7.4 MG/DL (ref 8.5–10.1)
CALCIUM SERPL-MCNC: 8.5 MG/DL (ref 8.5–10.1)
CHLORIDE SERPL-SCNC: 117 MMOL/L (ref 94–109)
CHLORIDE SERPL-SCNC: 126 MMOL/L (ref 94–109)
CO2 SERPL-SCNC: 27 MMOL/L (ref 20–32)
CO2 SERPL-SCNC: 27 MMOL/L (ref 20–32)
CREAT SERPL-MCNC: 0.89 MG/DL (ref 0.66–1.25)
CREAT SERPL-MCNC: 0.99 MG/DL (ref 0.66–1.25)
DIFFERENTIAL METHOD BLD: ABNORMAL
EOSINOPHIL # BLD AUTO: 0 10E9/L (ref 0–0.7)
EOSINOPHIL NFR BLD AUTO: 0 %
ERYTHROCYTE [DISTWIDTH] IN BLOOD BY AUTOMATED COUNT: 15.1 % (ref 10–15)
GFR SERPL CREATININE-BSD FRML MDRD: 84 ML/MIN/{1.73_M2}
GFR SERPL CREATININE-BSD FRML MDRD: >90 ML/MIN/{1.73_M2}
GLUCOSE BLDC GLUCOMTR-MCNC: 100 MG/DL (ref 70–99)
GLUCOSE BLDC GLUCOMTR-MCNC: 107 MG/DL (ref 70–99)
GLUCOSE BLDC GLUCOMTR-MCNC: 109 MG/DL (ref 70–99)
GLUCOSE BLDC GLUCOMTR-MCNC: 151 MG/DL (ref 70–99)
GLUCOSE BLDC GLUCOMTR-MCNC: 152 MG/DL (ref 70–99)
GLUCOSE BLDC GLUCOMTR-MCNC: 75 MG/DL (ref 70–99)
GLUCOSE SERPL-MCNC: 148 MG/DL (ref 70–99)
GLUCOSE SERPL-MCNC: 96 MG/DL (ref 70–99)
HCT VFR BLD AUTO: 28 % (ref 40–53)
HGB BLD-MCNC: 8.5 G/DL (ref 13.3–17.7)
IMM GRANULOCYTES # BLD: 0.2 10E9/L (ref 0–0.4)
IMM GRANULOCYTES NFR BLD: 1.7 %
LYMPHOCYTES # BLD AUTO: 1.2 10E9/L (ref 0.8–5.3)
LYMPHOCYTES NFR BLD AUTO: 13.3 %
MCH RBC QN AUTO: 28.9 PG (ref 26.5–33)
MCHC RBC AUTO-ENTMCNC: 30.4 G/DL (ref 31.5–36.5)
MCV RBC AUTO: 95 FL (ref 78–100)
MONOCYTES # BLD AUTO: 0.5 10E9/L (ref 0–1.3)
MONOCYTES NFR BLD AUTO: 5.5 %
NEUTROPHILS # BLD AUTO: 7.1 10E9/L (ref 1.6–8.3)
NEUTROPHILS NFR BLD AUTO: 79.4 %
NRBC # BLD AUTO: 0 10*3/UL
NRBC BLD AUTO-RTO: 0 /100
OSMOLALITY UR: 183 MMOL/KG (ref 100–1200)
PLATELET # BLD AUTO: 175 10E9/L (ref 150–450)
POTASSIUM SERPL-SCNC: 3.3 MMOL/L (ref 3.4–5.3)
POTASSIUM SERPL-SCNC: 3.4 MMOL/L (ref 3.4–5.3)
PROT SERPL-MCNC: 5.8 G/DL (ref 6.8–8.8)
PROT SERPL-MCNC: 6.4 G/DL (ref 6.8–8.8)
RBC # BLD AUTO: 2.94 10E12/L (ref 4.4–5.9)
SODIUM SERPL-SCNC: 149 MMOL/L (ref 133–144)
SODIUM SERPL-SCNC: 159 MMOL/L (ref 133–144)
SODIUM SERPL-SCNC: 161 MMOL/L (ref 133–144)
SODIUM SERPL-SCNC: 161 MMOL/L (ref 133–144)
SODIUM SERPL-SCNC: 167 MMOL/L (ref 133–144)
SP GR UR STRIP: 1 (ref 1–1.03)
SPECIMEN SOURCE: NORMAL
SPECIMEN SOURCE: NORMAL
WBC # BLD AUTO: 8.9 10E9/L (ref 4–11)

## 2020-10-31 PROCEDURE — 250N000011 HC RX IP 250 OP 636: Performed by: NURSE PRACTITIONER

## 2020-10-31 PROCEDURE — 999N000043 HC STATISTIC CTO2 CONT OXYGEN TECH TIME EA 90 MIN

## 2020-10-31 PROCEDURE — 83935 ASSAY OF URINE OSMOLALITY: CPT | Performed by: NURSE PRACTITIONER

## 2020-10-31 PROCEDURE — 99233 SBSQ HOSP IP/OBS HIGH 50: CPT | Performed by: PEDIATRICS

## 2020-10-31 PROCEDURE — 258N000003 HC RX IP 258 OP 636: Performed by: INTERNAL MEDICINE

## 2020-10-31 PROCEDURE — 250N000013 HC RX MED GY IP 250 OP 250 PS 637: Performed by: NURSE PRACTITIONER

## 2020-10-31 PROCEDURE — 81003 URINALYSIS AUTO W/O SCOPE: CPT | Performed by: NURSE PRACTITIONER

## 2020-10-31 PROCEDURE — 80053 COMPREHEN METABOLIC PANEL: CPT | Performed by: INTERNAL MEDICINE

## 2020-10-31 PROCEDURE — 999N001017 HC STATISTIC GLUCOSE BY METER IP

## 2020-10-31 PROCEDURE — 999N000147 HC STATISTIC PT IP EVAL DEFER

## 2020-10-31 PROCEDURE — 250N000011 HC RX IP 250 OP 636: Performed by: INTERNAL MEDICINE

## 2020-10-31 PROCEDURE — 36415 COLL VENOUS BLD VENIPUNCTURE: CPT | Performed by: NURSE PRACTITIONER

## 2020-10-31 PROCEDURE — 36415 COLL VENOUS BLD VENIPUNCTURE: CPT | Performed by: INTERNAL MEDICINE

## 2020-10-31 PROCEDURE — 85025 COMPLETE CBC W/AUTO DIFF WBC: CPT | Performed by: INTERNAL MEDICINE

## 2020-10-31 PROCEDURE — 120N000002 HC R&B MED SURG/OB UMMC

## 2020-10-31 PROCEDURE — 272N000078 HC NUTRITION PRODUCT INTERMEDIATE LITER

## 2020-10-31 PROCEDURE — 250N000013 HC RX MED GY IP 250 OP 250 PS 637: Performed by: STUDENT IN AN ORGANIZED HEALTH CARE EDUCATION/TRAINING PROGRAM

## 2020-10-31 PROCEDURE — 999N000226 HC STATISTIC SLP IP EVAL DEFER

## 2020-10-31 PROCEDURE — 250N000011 HC RX IP 250 OP 636: Performed by: PEDIATRICS

## 2020-10-31 PROCEDURE — 258N000003 HC RX IP 258 OP 636: Performed by: NURSE PRACTITIONER

## 2020-10-31 PROCEDURE — 36415 COLL VENOUS BLD VENIPUNCTURE: CPT | Performed by: PHYSICIAN ASSISTANT

## 2020-10-31 PROCEDURE — 250N000013 HC RX MED GY IP 250 OP 250 PS 637: Performed by: PEDIATRICS

## 2020-10-31 PROCEDURE — C9113 INJ PANTOPRAZOLE SODIUM, VIA: HCPCS | Performed by: STUDENT IN AN ORGANIZED HEALTH CARE EDUCATION/TRAINING PROGRAM

## 2020-10-31 PROCEDURE — 250N000011 HC RX IP 250 OP 636: Performed by: STUDENT IN AN ORGANIZED HEALTH CARE EDUCATION/TRAINING PROGRAM

## 2020-10-31 PROCEDURE — 84295 ASSAY OF SERUM SODIUM: CPT | Performed by: NURSE PRACTITIONER

## 2020-10-31 PROCEDURE — 999N000215 HC STATISTIC HFNC ADULT NON-CPAP

## 2020-10-31 PROCEDURE — 80053 COMPREHEN METABOLIC PANEL: CPT | Performed by: NURSE PRACTITIONER

## 2020-10-31 PROCEDURE — 999N000157 HC STATISTIC RCP TIME EA 10 MIN

## 2020-10-31 RX ORDER — DEXTROSE MONOHYDRATE 50 MG/ML
INJECTION, SOLUTION INTRAVENOUS CONTINUOUS
Status: DISCONTINUED | OUTPATIENT
Start: 2020-10-31 | End: 2020-11-01

## 2020-10-31 RX ORDER — HYDROCORTISONE 5 MG/1
10 TABLET ORAL EVERY 24 HOURS
Status: DISCONTINUED | OUTPATIENT
Start: 2020-11-02 | End: 2020-11-02

## 2020-10-31 RX ORDER — HYDROCORTISONE 10 MG/1
20 TABLET ORAL DAILY
Status: DISCONTINUED | OUTPATIENT
Start: 2020-11-02 | End: 2020-11-02

## 2020-10-31 RX ORDER — DESMOPRESSIN ACETATE 4 UG/ML
1 INJECTION, SOLUTION INTRAVENOUS; SUBCUTANEOUS 2 TIMES DAILY
Status: DISCONTINUED | OUTPATIENT
Start: 2020-10-31 | End: 2020-11-01

## 2020-10-31 RX ORDER — POTASSIUM CHLORIDE 1.5 G/1.58G
25 POWDER, FOR SOLUTION ORAL ONCE
Status: COMPLETED | OUTPATIENT
Start: 2020-10-31 | End: 2020-11-01

## 2020-10-31 RX ADMIN — CIPROFLOXACIN 400 MG: 2 INJECTION, SOLUTION INTRAVENOUS at 00:04

## 2020-10-31 RX ADMIN — METOCLOPRAMIDE HYDROCHLORIDE 10 MG: 5 SOLUTION ORAL at 09:58

## 2020-10-31 RX ADMIN — HYDROCORTISONE SODIUM SUCCINATE 50 MG: 100 INJECTION, POWDER, FOR SOLUTION INTRAMUSCULAR; INTRAVENOUS at 09:59

## 2020-10-31 RX ADMIN — CIPROFLOXACIN 400 MG: 2 INJECTION, SOLUTION INTRAVENOUS at 09:51

## 2020-10-31 RX ADMIN — BRIVARACETAM 100 MG: 10 SOLUTION ORAL at 09:58

## 2020-10-31 RX ADMIN — CARBAMAZEPINE 150 MG: 100 SUSPENSION ORAL at 12:47

## 2020-10-31 RX ADMIN — Medication 2 SPRAY: at 15:51

## 2020-10-31 RX ADMIN — PIPERACILLIN SODIUM AND TAZOBACTAM SODIUM 4.5 G: 4; .5 INJECTION, POWDER, LYOPHILIZED, FOR SOLUTION INTRAVENOUS at 21:33

## 2020-10-31 RX ADMIN — INSULIN ASPART 1 UNITS: 100 INJECTION, SOLUTION INTRAVENOUS; SUBCUTANEOUS at 06:12

## 2020-10-31 RX ADMIN — DESMOPRESSIN ACETATE 1 MCG: 4 SOLUTION INTRAVENOUS at 22:48

## 2020-10-31 RX ADMIN — CALCIUM CARBONATE 1250 MG: 1250 SUSPENSION ORAL at 12:47

## 2020-10-31 RX ADMIN — PIPERACILLIN SODIUM AND TAZOBACTAM SODIUM 4.5 G: 4; .5 INJECTION, POWDER, LYOPHILIZED, FOR SOLUTION INTRAVENOUS at 08:33

## 2020-10-31 RX ADMIN — BRIVARACETAM 100 MG: 10 SOLUTION ORAL at 19:08

## 2020-10-31 RX ADMIN — CARBAMAZEPINE 150 MG: 100 SUSPENSION ORAL at 00:04

## 2020-10-31 RX ADMIN — LEVOTHYROXINE SODIUM 150 MCG: 0.15 TABLET ORAL at 09:58

## 2020-10-31 RX ADMIN — PANTOPRAZOLE SODIUM 40 MG: 40 INJECTION, POWDER, FOR SOLUTION INTRAVENOUS at 09:58

## 2020-10-31 RX ADMIN — DEXTROSE MONOHYDRATE: 50 INJECTION, SOLUTION INTRAVENOUS at 18:53

## 2020-10-31 RX ADMIN — CARBAMAZEPINE 150 MG: 100 SUSPENSION ORAL at 19:20

## 2020-10-31 RX ADMIN — CIPROFLOXACIN 400 MG: 2 INJECTION, SOLUTION INTRAVENOUS at 22:15

## 2020-10-31 RX ADMIN — METOCLOPRAMIDE HYDROCHLORIDE 10 MG: 5 SOLUTION ORAL at 12:47

## 2020-10-31 RX ADMIN — Medication 2 SPRAY: at 12:47

## 2020-10-31 RX ADMIN — HYDROCORTISONE 50 MG: 20 TABLET ORAL at 19:43

## 2020-10-31 RX ADMIN — PIPERACILLIN SODIUM AND TAZOBACTAM SODIUM 4.5 G: 4; .5 INJECTION, POWDER, LYOPHILIZED, FOR SOLUTION INTRAVENOUS at 14:23

## 2020-10-31 RX ADMIN — Medication 50 MCG: at 09:58

## 2020-10-31 RX ADMIN — MICONAZOLE NITRATE: 20 POWDER TOPICAL at 19:09

## 2020-10-31 RX ADMIN — MULTIVITAMIN 15 ML: LIQUID ORAL at 09:58

## 2020-10-31 RX ADMIN — BRIVARACETAM 100 MG: 10 SOLUTION ORAL at 01:23

## 2020-10-31 RX ADMIN — DOCUSATE SODIUM 50 MG AND SENNOSIDES 8.6 MG 1 TABLET: 8.6; 5 TABLET, FILM COATED ORAL at 21:30

## 2020-10-31 RX ADMIN — HYDROCORTISONE 50 MG: 20 TABLET ORAL at 19:49

## 2020-10-31 RX ADMIN — METOCLOPRAMIDE HYDROCHLORIDE 10 MG: 5 SOLUTION ORAL at 21:30

## 2020-10-31 RX ADMIN — HYDROCORTISONE SODIUM SUCCINATE 50 MG: 100 INJECTION, POWDER, FOR SOLUTION INTRAMUSCULAR; INTRAVENOUS at 01:14

## 2020-10-31 RX ADMIN — INSULIN GLARGINE 20 UNITS: 100 INJECTION, SOLUTION SUBCUTANEOUS at 09:56

## 2020-10-31 RX ADMIN — CALCIUM CARBONATE 1250 MG: 1250 SUSPENSION ORAL at 19:08

## 2020-10-31 RX ADMIN — ENOXAPARIN SODIUM 40 MG: 40 INJECTION SUBCUTANEOUS at 06:12

## 2020-10-31 RX ADMIN — CALCIUM CARBONATE 1250 MG: 1250 SUSPENSION ORAL at 09:58

## 2020-10-31 RX ADMIN — CARBAMAZEPINE 150 MG: 100 SUSPENSION ORAL at 06:12

## 2020-10-31 RX ADMIN — Medication 1 PACKET: at 09:55

## 2020-10-31 RX ADMIN — METOCLOPRAMIDE HYDROCHLORIDE 10 MG: 5 SOLUTION ORAL at 15:45

## 2020-10-31 RX ADMIN — PIPERACILLIN SODIUM AND TAZOBACTAM SODIUM 4.5 G: 4; .5 INJECTION, POWDER, LYOPHILIZED, FOR SOLUTION INTRAVENOUS at 01:14

## 2020-10-31 RX ADMIN — SODIUM BICARBONATE 650 MG TABLET 650 MG: at 09:58

## 2020-10-31 RX ADMIN — HYDROCORTISONE 50 MG: 20 TABLET ORAL at 15:45

## 2020-10-31 RX ADMIN — VANCOMYCIN HYDROCHLORIDE 1500 MG: 10 INJECTION, POWDER, LYOPHILIZED, FOR SOLUTION INTRAVENOUS at 15:31

## 2020-10-31 RX ADMIN — CIPROFLOXACIN 400 MG: 2 INJECTION, SOLUTION INTRAVENOUS at 23:52

## 2020-10-31 ASSESSMENT — ACTIVITIES OF DAILY LIVING (ADL)
ADLS_ACUITY_SCORE: 40

## 2020-10-31 ASSESSMENT — MIFFLIN-ST. JEOR: SCORE: 1582.13

## 2020-10-31 NOTE — PLAN OF CARE
[id number]:  Patient denies pain. Opens eyes spontaneously and able to smile and slightly shake head when asking if he was in pain. Oral suctioned x4 and oral swabbed/care done each time. Armando colored sputum and some thick phlegm suctioned out at times. TF off for a few hours as it was not available from nutrition. Currently running at goal rate of 55 ml/hr. Patient tolerating well through J tube. G tube to gravity. Lerma with good urine output, darell in color. No BM yet today. Placed on pulsate mattress and repositioned q2H. Mother came to visit for most of the day and very worried about patient. Patient continues to sat % on 30% high flow nasal cannula. Will continue to monitor and follow plan of care.

## 2020-10-31 NOTE — PROVIDER NOTIFICATION
Paged Krystle with SLP at [phone]    LOW [phone] [phone] [place] [phone] FYI pt mom is in room if you want to come discuss swallow study etc... with mom

## 2020-10-31 NOTE — PROVIDER NOTIFICATION
Paged Gold cross cover at 1221 ([facility])    HIGH 5B [phone] ISIDORO John RN [phone] please call RN re: critical lab value.     Re-paged gold cross cover MD at 1221 ([facility])  HIGH 5B [phone] ISIDORO John RN [phone] please call RN re: critical lab value.     MD called back, Sodium level=16. MD ordered CMP
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
## 2020-10-31 NOTE — PLAN OF CARE
PT 5B                                        PT Deferral  Discharge Planner PT   Patient plan for discharge: Home with assist  Current status: PT order for evaluation and treatment acknowledged. OT notes reviewed as well as previous PT deferral note (10/29/20). Mother present and discussed pt's prior functional status. Pt is bed and w/c bound and non-ambulatory. Pt has nursing and PCA assist at home as well as his mother. Pt transfers with maximum assist of 2 as his baseline. No skilled PT needs are identified at this time. OT will continue to follow pt and PT will defer and complete the order.  Barriers to return to prior living situation: Medical condition  Recommendations for discharge: Home with mother and HH/PCA assist as before.  Rationale for recommendations: No skilled PT needs identified. OT will follow.     Entered by: Donald Ferreira 10/31/2020 11:23 AM
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
## 2020-10-31 NOTE — PLAN OF CARE
Assumed cares [id number]. VSS on 30% high flow nasal cannula. Pt nonverbal, however shakes head when asked about pain.  Suctioned x2 with thick sputum. Lung sounds coarse. TF running at goal of 55 mL/hr via J tube, G tube to gravity with green output. Lerma with adequate darell output. Repositioned q2 overnight.  Appears to be resting comfortably between cares. Continue to monitor and update MD with changes.

## 2020-11-01 ENCOUNTER — APPOINTMENT (OUTPATIENT)
Dept: GENERAL RADIOLOGY | Facility: CLINIC | Age: 60
DRG: 871 | End: 2020-11-01
Attending: PHYSICIAN ASSISTANT
Payer: MEDICARE

## 2020-11-01 LAB
ALBUMIN SERPL-MCNC: 2.1 G/DL (ref 3.4–5)
ALBUMIN UR-MCNC: 10 MG/DL
ALP SERPL-CCNC: 105 U/L (ref 40–150)
ALT SERPL W P-5'-P-CCNC: 45 U/L (ref 0–70)
ANION GAP SERPL CALCULATED.3IONS-SCNC: 4 MMOL/L (ref 3–14)
APPEARANCE UR: CLEAR
AST SERPL W P-5'-P-CCNC: 23 U/L (ref 0–45)
BASOPHILS # BLD AUTO: 0 10E9/L (ref 0–0.2)
BASOPHILS NFR BLD AUTO: 0.3 %
BILIRUB SERPL-MCNC: 0.4 MG/DL (ref 0.2–1.3)
BILIRUB UR QL STRIP: NEGATIVE
BUN SERPL-MCNC: 20 MG/DL (ref 7–30)
CALCIUM SERPL-MCNC: 8.3 MG/DL (ref 8.5–10.1)
CHLORIDE SERPL-SCNC: 127 MMOL/L (ref 94–109)
CO2 SERPL-SCNC: 29 MMOL/L (ref 20–32)
COLOR UR AUTO: YELLOW
CREAT SERPL-MCNC: 0.93 MG/DL (ref 0.66–1.25)
CRP SERPL-MCNC: 12 MG/L (ref 0–8)
DIFFERENTIAL METHOD BLD: ABNORMAL
EOSINOPHIL # BLD AUTO: 0 10E9/L (ref 0–0.7)
EOSINOPHIL NFR BLD AUTO: 0.1 %
ERYTHROCYTE [DISTWIDTH] IN BLOOD BY AUTOMATED COUNT: 15.9 % (ref 10–15)
ERYTHROCYTE [DISTWIDTH] IN BLOOD BY AUTOMATED COUNT: 15.9 % (ref 10–15)
GFR SERPL CREATININE-BSD FRML MDRD: >90 ML/MIN/{1.73_M2}
GLUCOSE BLDC GLUCOMTR-MCNC: 104 MG/DL (ref 70–99)
GLUCOSE BLDC GLUCOMTR-MCNC: 104 MG/DL (ref 70–99)
GLUCOSE BLDC GLUCOMTR-MCNC: 106 MG/DL (ref 70–99)
GLUCOSE BLDC GLUCOMTR-MCNC: 128 MG/DL (ref 70–99)
GLUCOSE BLDC GLUCOMTR-MCNC: 140 MG/DL (ref 70–99)
GLUCOSE BLDC GLUCOMTR-MCNC: 70 MG/DL (ref 70–99)
GLUCOSE BLDC GLUCOMTR-MCNC: 87 MG/DL (ref 70–99)
GLUCOSE SERPL-MCNC: 108 MG/DL (ref 70–99)
GLUCOSE UR STRIP-MCNC: NEGATIVE MG/DL
HCT VFR BLD AUTO: 30 % (ref 40–53)
HCT VFR BLD AUTO: 32.3 % (ref 40–53)
HGB BLD-MCNC: 9.1 G/DL (ref 13.3–17.7)
HGB BLD-MCNC: 9.7 G/DL (ref 13.3–17.7)
HGB UR QL STRIP: ABNORMAL
IMM GRANULOCYTES # BLD: 0.2 10E9/L (ref 0–0.4)
IMM GRANULOCYTES NFR BLD: 2 %
KETONES UR STRIP-MCNC: NEGATIVE MG/DL
LACTATE BLD-SCNC: 2.3 MMOL/L (ref 0.7–2)
LEUKOCYTE ESTERASE UR QL STRIP: NEGATIVE
LYMPHOCYTES # BLD AUTO: 2.1 10E9/L (ref 0.8–5.3)
LYMPHOCYTES NFR BLD AUTO: 18.6 %
MCH RBC QN AUTO: 29.6 PG (ref 26.5–33)
MCH RBC QN AUTO: 29.9 PG (ref 26.5–33)
MCHC RBC AUTO-ENTMCNC: 30 G/DL (ref 31.5–36.5)
MCHC RBC AUTO-ENTMCNC: 30.3 G/DL (ref 31.5–36.5)
MCV RBC AUTO: 100 FL (ref 78–100)
MCV RBC AUTO: 98 FL (ref 78–100)
MONOCYTES # BLD AUTO: 1.2 10E9/L (ref 0–1.3)
MONOCYTES NFR BLD AUTO: 10 %
MUCOUS THREADS #/AREA URNS LPF: PRESENT /LPF
NEUTROPHILS # BLD AUTO: 7.9 10E9/L (ref 1.6–8.3)
NEUTROPHILS NFR BLD AUTO: 69 %
NITRATE UR QL: NEGATIVE
NRBC # BLD AUTO: 0 10*3/UL
NRBC BLD AUTO-RTO: 0 /100
PH UR STRIP: 8 PH (ref 5–7)
PLATELET # BLD AUTO: 203 10E9/L (ref 150–450)
PLATELET # BLD AUTO: 213 10E9/L (ref 150–450)
POTASSIUM SERPL-SCNC: 3.2 MMOL/L (ref 3.4–5.3)
PROCALCITONIN SERPL-MCNC: 0.13 NG/ML
PROT SERPL-MCNC: 6.1 G/DL (ref 6.8–8.8)
RBC # BLD AUTO: 3.07 10E12/L (ref 4.4–5.9)
RBC # BLD AUTO: 3.24 10E12/L (ref 4.4–5.9)
RBC #/AREA URNS AUTO: 35 /HPF (ref 0–2)
SODIUM SERPL-SCNC: 149 MMOL/L (ref 133–144)
SODIUM SERPL-SCNC: 149 MMOL/L (ref 133–144)
SODIUM SERPL-SCNC: 151 MMOL/L (ref 133–144)
SODIUM SERPL-SCNC: 152 MMOL/L (ref 133–144)
SODIUM SERPL-SCNC: 153 MMOL/L (ref 133–144)
SODIUM SERPL-SCNC: 154 MMOL/L (ref 133–144)
SODIUM SERPL-SCNC: 159 MMOL/L (ref 133–144)
SODIUM SERPL-SCNC: 159 MMOL/L (ref 133–144)
SODIUM SERPL-SCNC: 165 MMOL/L (ref 133–144)
SODIUM SERPL-SCNC: 166 MMOL/L (ref 133–144)
SOURCE: ABNORMAL
SP GR UR STRIP: 1.02 (ref 1–1.03)
UROBILINOGEN UR STRIP-MCNC: NORMAL MG/DL (ref 0–2)
WBC # BLD AUTO: 11.2 10E9/L (ref 4–11)
WBC # BLD AUTO: 11.5 10E9/L (ref 4–11)
WBC #/AREA URNS AUTO: 1 /HPF (ref 0–5)

## 2020-11-01 PROCEDURE — 80053 COMPREHEN METABOLIC PANEL: CPT | Performed by: INTERNAL MEDICINE

## 2020-11-01 PROCEDURE — 250N000013 HC RX MED GY IP 250 OP 250 PS 637: Performed by: STUDENT IN AN ORGANIZED HEALTH CARE EDUCATION/TRAINING PROGRAM

## 2020-11-01 PROCEDURE — 94640 AIRWAY INHALATION TREATMENT: CPT

## 2020-11-01 PROCEDURE — 250N000013 HC RX MED GY IP 250 OP 250 PS 637: Performed by: PEDIATRICS

## 2020-11-01 PROCEDURE — 36415 COLL VENOUS BLD VENIPUNCTURE: CPT | Performed by: NURSE PRACTITIONER

## 2020-11-01 PROCEDURE — 258N000003 HC RX IP 258 OP 636: Performed by: STUDENT IN AN ORGANIZED HEALTH CARE EDUCATION/TRAINING PROGRAM

## 2020-11-01 PROCEDURE — 99233 SBSQ HOSP IP/OBS HIGH 50: CPT | Performed by: PEDIATRICS

## 2020-11-01 PROCEDURE — 81001 URINALYSIS AUTO W/SCOPE: CPT | Performed by: PHYSICIAN ASSISTANT

## 2020-11-01 PROCEDURE — 250N000011 HC RX IP 250 OP 636: Performed by: NURSE PRACTITIONER

## 2020-11-01 PROCEDURE — 999N000127 HC STATISTIC PERIPHERAL IV START W US GUIDANCE

## 2020-11-01 PROCEDURE — 999N001017 HC STATISTIC GLUCOSE BY METER IP

## 2020-11-01 PROCEDURE — 84295 ASSAY OF SERUM SODIUM: CPT | Performed by: PHYSICIAN ASSISTANT

## 2020-11-01 PROCEDURE — 36415 COLL VENOUS BLD VENIPUNCTURE: CPT | Performed by: INTERNAL MEDICINE

## 2020-11-01 PROCEDURE — 250N000013 HC RX MED GY IP 250 OP 250 PS 637: Performed by: NURSE PRACTITIONER

## 2020-11-01 PROCEDURE — 250N000011 HC RX IP 250 OP 636: Performed by: PEDIATRICS

## 2020-11-01 PROCEDURE — 250N000011 HC RX IP 250 OP 636: Performed by: INTERNAL MEDICINE

## 2020-11-01 PROCEDURE — 258N000003 HC RX IP 258 OP 636: Performed by: INTERNAL MEDICINE

## 2020-11-01 PROCEDURE — 250N000009 HC RX 250: Performed by: PEDIATRICS

## 2020-11-01 PROCEDURE — 85025 COMPLETE CBC W/AUTO DIFF WBC: CPT | Performed by: INTERNAL MEDICINE

## 2020-11-01 PROCEDURE — C9113 INJ PANTOPRAZOLE SODIUM, VIA: HCPCS | Performed by: STUDENT IN AN ORGANIZED HEALTH CARE EDUCATION/TRAINING PROGRAM

## 2020-11-01 PROCEDURE — 86140 C-REACTIVE PROTEIN: CPT | Performed by: PHYSICIAN ASSISTANT

## 2020-11-01 PROCEDURE — 84145 PROCALCITONIN (PCT): CPT | Performed by: PHYSICIAN ASSISTANT

## 2020-11-01 PROCEDURE — 272N000078 HC NUTRITION PRODUCT INTERMEDIATE LITER

## 2020-11-01 PROCEDURE — 120N000003 HC R&B IMCU UMMC

## 2020-11-01 PROCEDURE — 999N000128 HC STATISTIC PERIPHERAL IV START W/O US GUIDANCE

## 2020-11-01 PROCEDURE — 71045 X-RAY EXAM CHEST 1 VIEW: CPT

## 2020-11-01 PROCEDURE — 85027 COMPLETE CBC AUTOMATED: CPT | Performed by: PHYSICIAN ASSISTANT

## 2020-11-01 PROCEDURE — 36415 COLL VENOUS BLD VENIPUNCTURE: CPT | Performed by: PHYSICIAN ASSISTANT

## 2020-11-01 PROCEDURE — 999N000157 HC STATISTIC RCP TIME EA 10 MIN

## 2020-11-01 PROCEDURE — 71045 X-RAY EXAM CHEST 1 VIEW: CPT | Mod: 26 | Performed by: RADIOLOGY

## 2020-11-01 PROCEDURE — 999N000043 HC STATISTIC CTO2 CONT OXYGEN TECH TIME EA 90 MIN

## 2020-11-01 PROCEDURE — 250N000011 HC RX IP 250 OP 636: Performed by: STUDENT IN AN ORGANIZED HEALTH CARE EDUCATION/TRAINING PROGRAM

## 2020-11-01 PROCEDURE — 87040 BLOOD CULTURE FOR BACTERIA: CPT | Performed by: PHYSICIAN ASSISTANT

## 2020-11-01 PROCEDURE — 83605 ASSAY OF LACTIC ACID: CPT | Performed by: PHYSICIAN ASSISTANT

## 2020-11-01 PROCEDURE — 999N000215 HC STATISTIC HFNC ADULT NON-CPAP

## 2020-11-01 PROCEDURE — 250N000013 HC RX MED GY IP 250 OP 250 PS 637: Performed by: INTERNAL MEDICINE

## 2020-11-01 PROCEDURE — 84295 ASSAY OF SERUM SODIUM: CPT | Performed by: NURSE PRACTITIONER

## 2020-11-01 PROCEDURE — 99207 PR NO BILLABLE SERVICE THIS VISIT: CPT | Performed by: INTERNAL MEDICINE

## 2020-11-01 RX ORDER — SODIUM CHLORIDE FOR INHALATION 3 %
3 VIAL, NEBULIZER (ML) INHALATION EVERY 8 HOURS
Status: DISPENSED | OUTPATIENT
Start: 2020-11-01 | End: 2020-11-03

## 2020-11-01 RX ORDER — POTASSIUM CHLORIDE 1.5 G/1.58G
40 POWDER, FOR SOLUTION ORAL ONCE
Status: COMPLETED | OUTPATIENT
Start: 2020-11-01 | End: 2020-11-01

## 2020-11-01 RX ORDER — PIPERACILLIN SODIUM, TAZOBACTAM SODIUM 4; .5 G/20ML; G/20ML
4.5 INJECTION, POWDER, LYOPHILIZED, FOR SOLUTION INTRAVENOUS EVERY 6 HOURS
Status: COMPLETED | OUTPATIENT
Start: 2020-11-01 | End: 2020-11-02

## 2020-11-01 RX ADMIN — METOCLOPRAMIDE HYDROCHLORIDE 10 MG: 5 SOLUTION ORAL at 15:46

## 2020-11-01 RX ADMIN — INSULIN GLARGINE 20 UNITS: 100 INJECTION, SOLUTION SUBCUTANEOUS at 08:35

## 2020-11-01 RX ADMIN — CALCIUM CARBONATE 1250 MG: 1250 SUSPENSION ORAL at 08:33

## 2020-11-01 RX ADMIN — HYDROCORTISONE 50 MG: 20 TABLET ORAL at 21:11

## 2020-11-01 RX ADMIN — PIPERACILLIN SODIUM AND TAZOBACTAM SODIUM 4.5 G: 4; .5 INJECTION, POWDER, LYOPHILIZED, FOR SOLUTION INTRAVENOUS at 03:24

## 2020-11-01 RX ADMIN — Medication 50 MCG: at 08:32

## 2020-11-01 RX ADMIN — BRIVARACETAM 100 MG: 10 SOLUTION ORAL at 21:11

## 2020-11-01 RX ADMIN — CIPROFLOXACIN 400 MG: 2 INJECTION, SOLUTION INTRAVENOUS at 08:31

## 2020-11-01 RX ADMIN — CIPROFLOXACIN 400 MG: 2 INJECTION, SOLUTION INTRAVENOUS at 15:46

## 2020-11-01 RX ADMIN — MICONAZOLE NITRATE: 20 POWDER TOPICAL at 21:29

## 2020-11-01 RX ADMIN — CARBAMAZEPINE 150 MG: 100 SUSPENSION ORAL at 05:16

## 2020-11-01 RX ADMIN — CALCIUM CARBONATE 1250 MG: 1250 SUSPENSION ORAL at 18:31

## 2020-11-01 RX ADMIN — MULTIVITAMIN 15 ML: LIQUID ORAL at 08:32

## 2020-11-01 RX ADMIN — ACETAMINOPHEN 650 MG: 325 SOLUTION ORAL at 21:10

## 2020-11-01 RX ADMIN — SODIUM CHLORIDE SOLN NEBU 3% 3 ML: 3 NEBU SOLN at 15:21

## 2020-11-01 RX ADMIN — Medication 1 PACKET: at 08:35

## 2020-11-01 RX ADMIN — DESMOPRESSIN ACETATE 1 MCG: 4 SOLUTION INTRAVENOUS at 08:33

## 2020-11-01 RX ADMIN — HYDROCORTISONE 50 MG: 20 TABLET ORAL at 08:32

## 2020-11-01 RX ADMIN — DEXTROSE MONOHYDRATE: 50 INJECTION, SOLUTION INTRAVENOUS at 06:24

## 2020-11-01 RX ADMIN — PANTOPRAZOLE SODIUM 40 MG: 40 INJECTION, POWDER, FOR SOLUTION INTRAVENOUS at 08:32

## 2020-11-01 RX ADMIN — VANCOMYCIN HYDROCHLORIDE 1500 MG: 10 INJECTION, POWDER, LYOPHILIZED, FOR SOLUTION INTRAVENOUS at 09:45

## 2020-11-01 RX ADMIN — PIPERACILLIN SODIUM AND TAZOBACTAM SODIUM 4.5 G: 4; .5 INJECTION, POWDER, LYOPHILIZED, FOR SOLUTION INTRAVENOUS at 23:29

## 2020-11-01 RX ADMIN — METOCLOPRAMIDE HYDROCHLORIDE 10 MG: 5 SOLUTION ORAL at 11:27

## 2020-11-01 RX ADMIN — CARBAMAZEPINE 150 MG: 100 SUSPENSION ORAL at 18:31

## 2020-11-01 RX ADMIN — ENOXAPARIN SODIUM 40 MG: 40 INJECTION SUBCUTANEOUS at 05:16

## 2020-11-01 RX ADMIN — POTASSIUM CHLORIDE 25 MEQ: 1.5 POWDER, FOR SOLUTION ORAL at 00:07

## 2020-11-01 RX ADMIN — INSULIN ASPART 1 UNITS: 100 INJECTION, SOLUTION INTRAVENOUS; SUBCUTANEOUS at 00:10

## 2020-11-01 RX ADMIN — SODIUM BICARBONATE 650 MG TABLET 650 MG: at 08:32

## 2020-11-01 RX ADMIN — BRIVARACETAM 100 MG: 10 SOLUTION ORAL at 08:36

## 2020-11-01 RX ADMIN — METOCLOPRAMIDE HYDROCHLORIDE 10 MG: 5 SOLUTION ORAL at 08:32

## 2020-11-01 RX ADMIN — MICONAZOLE NITRATE: 20 POWDER TOPICAL at 08:36

## 2020-11-01 RX ADMIN — PIPERACILLIN SODIUM AND TAZOBACTAM SODIUM 4.5 G: 4; .5 INJECTION, POWDER, LYOPHILIZED, FOR SOLUTION INTRAVENOUS at 10:22

## 2020-11-01 RX ADMIN — CARBAMAZEPINE 150 MG: 100 SUSPENSION ORAL at 23:41

## 2020-11-01 RX ADMIN — POTASSIUM CHLORIDE 40 MEQ: 1.5 POWDER, FOR SOLUTION ORAL at 05:52

## 2020-11-01 RX ADMIN — CALCIUM CARBONATE 1250 MG: 1250 SUSPENSION ORAL at 11:26

## 2020-11-01 RX ADMIN — METOCLOPRAMIDE HYDROCHLORIDE 10 MG: 5 SOLUTION ORAL at 22:16

## 2020-11-01 RX ADMIN — LEVOTHYROXINE SODIUM 150 MCG: 0.15 TABLET ORAL at 08:32

## 2020-11-01 RX ADMIN — CARBAMAZEPINE 150 MG: 100 SUSPENSION ORAL at 11:26

## 2020-11-01 RX ADMIN — INSULIN ASPART 1 UNITS: 100 INJECTION, SOLUTION INTRAVENOUS; SUBCUTANEOUS at 11:47

## 2020-11-01 RX ADMIN — CARBAMAZEPINE 150 MG: 100 SUSPENSION ORAL at 00:07

## 2020-11-01 ASSESSMENT — MIFFLIN-ST. JEOR: SCORE: 1576.13

## 2020-11-01 ASSESSMENT — ACTIVITIES OF DAILY LIVING (ADL)
ADLS_ACUITY_SCORE: 41
ADLS_ACUITY_SCORE: 40
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
## 2020-11-01 NOTE — PROGRESS NOTES
Cross cover note:  I was paged multiple times regarding this patient and his elevated sodium levels. Patient history and hospitalization reviewed. Keyon has had elevated sodium levels before, diabetes insipidus, treated in ICU with DDAVP. Endocrine consult placed, and I spoke to the endocrinology fellow by phone (see endocrinology note).    Plan after discussion with endocrinology and chicho Ramirezist:  1. Transfer to intermediate care (ICU would be preferred, but limited bed availability right now and patient clinically stable.  2. Check sodium every 2 hours  3. DDAVP 1mcg IV STAT, ordered  4. Replace water deficit with D5W and free water combo with goal to decrease Na 10mEq/12 hr.  5. Urine osm and urine spec grav check    NATHANIEL Rodriguez CNP

## 2020-11-01 NOTE — PLAN OF CARE
Neuro: Alert, unable to assess orientation as pt is non verbal. Pt has limited movement on all extremities. Pupils sluggish.  Cardiac: SR. VSS.   Respiratory: Sating mid 90's on 30L high flow, 30%FiO2. LS coarse, intermittent cough but unable to fully clear secreations, oral suctioning required.   GI/: Lerma in place for neurogenic bladder, adequate urine output. Small BM X1.  Diet/appetite: NPO, tube feedings via J tube at goal, G tube to gravity.  Activity:  Assist of 2 for bed mobility, turn every 2 hours.  Pain: No s/sx of pain this shift.  Skin: No new skin issues noted. Dressing to sacrum changed due to soiling.  LDA's: BUE PIV, IVF now at 70cc/hr.    Plan: Continue to monitor sodium every 2 hours. Notify primary team with changes.
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
## 2020-11-01 NOTE — PROVIDER NOTIFICATION
Paged gold cross cover at 1221 ([facility])    HIGH 5B [phone] ISIDORO John RN [phone] please call RN re: critical lab value, thanks    Re-paged gold cross cover MD at 1221 ([facility])  HIGH 5B [phone] ISIDORO John RN [phone] please call RN re: critical lab value, thanks    MD called back. Will give DDAVP, check Q2h serum Na+, and transfer to [facility].

## 2020-11-01 NOTE — PROVIDER NOTIFICATION
"Paged gold cross cover MD at 1221 ([facility])    LOW 5B [phone] [place]ID[place] [phone] please enter order for \"peripheral IV\" so I can replace PIV that was leaking. thanks. Also, do you want K+ replacement via gtube?  " WDL

## 2020-11-01 NOTE — PLAN OF CARE
"BP (!) 147/68 (BP Location: Right arm)   Pulse 64   Temp 98.3  F (36.8  C) (Axillary)   Resp 20   Ht 1.829 m (6' 0.01\")   Wt 72.4 kg (159 lb 9.8 oz)   SpO2 96%   BMI 21.64 kg/m      VSS. Afebrile. Unable to assess orientation. Does open eyes spontaneously. Non-verbal. 30% FiO2 HFNC. Lung sounds coarse with intermittent cough. No non-verbal indicators of pain. NPO. TF's at goal at 55 ml/hr with 200 water flush q 4 hours. Sodiums have been elevated. Latest check is 159 this AM. Checking q 2 hours with results relayed to provider. D5 infusing at 100 ml/hr. Lerma patent with adequate UOP. Incontinent of 2 formed BM's. Repositioning q 2 hours. Mepilex covering coccyx changed. Continue to monitor.    Potassium 3.2 this AM. Replacing 40 Meq now. Re-check ordered for 10:00AM.  "

## 2020-11-01 NOTE — PLAN OF CARE
Assumed cares: [id number]  Status: no change    VS: AVSS, sating upper 90s on 30% high flow NC  Neuros: unchanged, pt is a quadriplegic and non-verbal  Cardiac: WDL   Respiratory: Coarse lungs, suction thick dark red blood secretions at back of throat X3  GI/: copious amounts of uop, light yellow   Nutrition: NPO  IV/Drains: R PIV and L PIV TKO with IV abx's  TF infusing at 55ml/h goal rate with 100ml/Q4h  Activity: Bedrest. Turn/repo Q2h and prn  Pain: no signs of pain  Skin: excoriated, blanchable redness on coccyx wound  Labs: hgb=8.5    Plan of Care:  Waiting for aspiration pneumonia to resolve, currently on IV abx's

## 2020-11-01 NOTE — PROVIDER NOTIFICATION
Paged 1221  Pt has a critical sodium of 167  -Provider aware, continue to update with q2 hr sodium checks.

## 2020-11-01 NOTE — PROGRESS NOTES
Patient arrived from  at this time. Non-verbal. Unable to assess orientation. Opens eyes spontaneously. 2 RN skin assessment done with Jillian CHRISTINE.    02:15-Gold cross-cover notified of Na of 165. No further orders at this time.

## 2020-11-01 NOTE — PROGRESS NOTES
Patient admitted for Septic shock in ICU, extubated 28th, currently on medicine  floor. He had worsening hyperNA today  from diabetes insipidus. On chart review, patient received DDVAP (24mcg?) On 28th during ICU admission with IV 1/2 NS replacement which brought NA down from 168 to 140s. However,  NA going up since today morning from 149-->161 with polyuria, 5450L UO from 12am to 9pm.     We recommend:  -1 dose IV DDAVP 1 mcg STAT  -replace with combination of D5W and free water flushes through TF to aim for goal of 10meQ Na decrease over 12 hours.   -we recommend close monitoring in ICU with NA check every 2 -3 hours.   -Obtain stat urine osm and specific gravity.

## 2020-11-01 NOTE — PROGRESS NOTES
Provider Notification:    Endocrinology notified that sodium level has decreased to 153, a  >10 point drop since 0200. New orders to hold desmopressin and decrease IVF rate to 70. Will continue to monitor Na every 2 hours.

## 2020-11-01 NOTE — PLAN OF CARE
Assumed cares: [phone]  Status: declining-suspect diabetes insipidus with uop>5,000ml since 0630 today    VS: AVSS, sating upper 90s on 30% high flow NC  Neuros: unchanged, pt is a quadriplegic and non-verbal  Cardiac: WDL   Respiratory: Coarse lungs   GI/: uop>5,000ml since 0630 today, aaron cares completed  Nutrition: NPO with continuous TFs  IV/Drains: R PIV infusing 5% dextrose at 75ml/h, L PIV TKO with IV abx's  TF infusing at 55ml/h goal rate with 200ml/Q4h  Activity: Bedrest. Turn/repo Q2h and prn  Pain: no signs of pain  Skin: mepilex on coccyx (excoriated, blanchable redness with small amt drainage) changed  Labs: K+=3.3, Na+=161, Urine samples sent    Plan of Care:  DDVP IV given X1, senna given X1-last stool charted 10/28. Serum Q2h Na+ levels drawn and plant to transfer to  when bed avail.     R PIV infiltrated-waiting for new IV placement then will need to restart dextrose 5% MIVF

## 2020-11-01 NOTE — PROGRESS NOTES
Assumed cares [id number]. VSS on 30% high flow nasal cannula. Continues to have coarse lung sounds and require suctioning. TF continue at goal rate of 55/hr via J tube, G tube to gravity. Lerma with copious pale output. 2 RPIVs infusing D5 at 100/hr and scheduled Cipro. Q2 hr sodium checks. Report given to 6B at 0035, pt left unit at 0130 with float RN, meds and belongings sent with patient.

## 2020-11-02 ENCOUNTER — APPOINTMENT (OUTPATIENT)
Dept: OCCUPATIONAL THERAPY | Facility: CLINIC | Age: 60
DRG: 871 | End: 2020-11-02
Attending: INTERNAL MEDICINE
Payer: MEDICARE

## 2020-11-02 LAB
ALBUMIN SERPL-MCNC: 2 G/DL (ref 3.4–5)
ALP SERPL-CCNC: 93 U/L (ref 40–150)
ALT SERPL W P-5'-P-CCNC: 49 U/L (ref 0–70)
ANION GAP SERPL CALCULATED.3IONS-SCNC: 3 MMOL/L (ref 3–14)
AST SERPL W P-5'-P-CCNC: 32 U/L (ref 0–45)
BASOPHILS # BLD AUTO: 0 10E9/L (ref 0–0.2)
BASOPHILS NFR BLD AUTO: 0.2 %
BILIRUB SERPL-MCNC: 0.4 MG/DL (ref 0.2–1.3)
BUN SERPL-MCNC: 21 MG/DL (ref 7–30)
CALCIUM SERPL-MCNC: 8 MG/DL (ref 8.5–10.1)
CHLORIDE SERPL-SCNC: 114 MMOL/L (ref 94–109)
CO2 SERPL-SCNC: 29 MMOL/L (ref 20–32)
CREAT SERPL-MCNC: 0.82 MG/DL (ref 0.66–1.25)
DIFFERENTIAL METHOD BLD: ABNORMAL
EOSINOPHIL # BLD AUTO: 0.3 10E9/L (ref 0–0.7)
EOSINOPHIL NFR BLD AUTO: 2.8 %
ERYTHROCYTE [DISTWIDTH] IN BLOOD BY AUTOMATED COUNT: 15.8 % (ref 10–15)
GFR SERPL CREATININE-BSD FRML MDRD: >90 ML/MIN/{1.73_M2}
GLUCOSE BLDC GLUCOMTR-MCNC: 105 MG/DL (ref 70–99)
GLUCOSE BLDC GLUCOMTR-MCNC: 106 MG/DL (ref 70–99)
GLUCOSE BLDC GLUCOMTR-MCNC: 111 MG/DL (ref 70–99)
GLUCOSE BLDC GLUCOMTR-MCNC: 121 MG/DL (ref 70–99)
GLUCOSE BLDC GLUCOMTR-MCNC: 126 MG/DL (ref 70–99)
GLUCOSE BLDC GLUCOMTR-MCNC: 135 MG/DL (ref 70–99)
GLUCOSE BLDC GLUCOMTR-MCNC: 89 MG/DL (ref 70–99)
GLUCOSE SERPL-MCNC: 121 MG/DL (ref 70–99)
HCT VFR BLD AUTO: 30.2 % (ref 40–53)
HGB BLD-MCNC: 9.1 G/DL (ref 13.3–17.7)
IMM GRANULOCYTES # BLD: 0.2 10E9/L (ref 0–0.4)
IMM GRANULOCYTES NFR BLD: 2 %
LACTATE BLD-SCNC: 1.4 MMOL/L (ref 0.7–2)
LYMPHOCYTES # BLD AUTO: 2.8 10E9/L (ref 0.8–5.3)
LYMPHOCYTES NFR BLD AUTO: 24.5 %
MCH RBC QN AUTO: 28.9 PG (ref 26.5–33)
MCHC RBC AUTO-ENTMCNC: 30.1 G/DL (ref 31.5–36.5)
MCV RBC AUTO: 96 FL (ref 78–100)
MONOCYTES # BLD AUTO: 1 10E9/L (ref 0–1.3)
MONOCYTES NFR BLD AUTO: 8.4 %
NEUTROPHILS # BLD AUTO: 7 10E9/L (ref 1.6–8.3)
NEUTROPHILS NFR BLD AUTO: 62.1 %
NRBC # BLD AUTO: 0 10*3/UL
NRBC BLD AUTO-RTO: 0 /100
PLATELET # BLD AUTO: 219 10E9/L (ref 150–450)
POTASSIUM SERPL-SCNC: 3.4 MMOL/L (ref 3.4–5.3)
PROT SERPL-MCNC: 5.7 G/DL (ref 6.8–8.8)
RBC # BLD AUTO: 3.15 10E12/L (ref 4.4–5.9)
SODIUM SERPL-SCNC: 145 MMOL/L (ref 133–144)
SODIUM SERPL-SCNC: 146 MMOL/L (ref 133–144)
SODIUM SERPL-SCNC: 147 MMOL/L (ref 133–144)
SODIUM SERPL-SCNC: 147 MMOL/L (ref 133–144)
SODIUM SERPL-SCNC: 148 MMOL/L (ref 133–144)
SODIUM SERPL-SCNC: 148 MMOL/L (ref 133–144)
SODIUM SERPL-SCNC: 149 MMOL/L (ref 133–144)
SODIUM SERPL-SCNC: 149 MMOL/L (ref 133–144)
SODIUM SERPL-SCNC: 152 MMOL/L (ref 133–144)
VANCOMYCIN SERPL-MCNC: 16.3 MG/L
WBC # BLD AUTO: 11.3 10E9/L (ref 4–11)

## 2020-11-02 PROCEDURE — 999N001017 HC STATISTIC GLUCOSE BY METER IP

## 2020-11-02 PROCEDURE — 250N000013 HC RX MED GY IP 250 OP 250 PS 637: Performed by: INTERNAL MEDICINE

## 2020-11-02 PROCEDURE — 83605 ASSAY OF LACTIC ACID: CPT | Performed by: NURSE PRACTITIONER

## 2020-11-02 PROCEDURE — 250N000011 HC RX IP 250 OP 636: Performed by: NURSE PRACTITIONER

## 2020-11-02 PROCEDURE — 36415 COLL VENOUS BLD VENIPUNCTURE: CPT | Performed by: INTERNAL MEDICINE

## 2020-11-02 PROCEDURE — 99233 SBSQ HOSP IP/OBS HIGH 50: CPT | Performed by: PEDIATRICS

## 2020-11-02 PROCEDURE — 250N000011 HC RX IP 250 OP 636: Performed by: STUDENT IN AN ORGANIZED HEALTH CARE EDUCATION/TRAINING PROGRAM

## 2020-11-02 PROCEDURE — 80202 ASSAY OF VANCOMYCIN: CPT | Performed by: INTERNAL MEDICINE

## 2020-11-02 PROCEDURE — 99207 PR NO BILLABLE SERVICE THIS VISIT: CPT | Performed by: INTERNAL MEDICINE

## 2020-11-02 PROCEDURE — 999N000157 HC STATISTIC RCP TIME EA 10 MIN

## 2020-11-02 PROCEDURE — 250N000011 HC RX IP 250 OP 636: Performed by: PEDIATRICS

## 2020-11-02 PROCEDURE — 272N000078 HC NUTRITION PRODUCT INTERMEDIATE LITER

## 2020-11-02 PROCEDURE — 85025 COMPLETE CBC W/AUTO DIFF WBC: CPT | Performed by: INTERNAL MEDICINE

## 2020-11-02 PROCEDURE — 250N000009 HC RX 250: Performed by: PEDIATRICS

## 2020-11-02 PROCEDURE — 258N000003 HC RX IP 258 OP 636: Performed by: INTERNAL MEDICINE

## 2020-11-02 PROCEDURE — 36415 COLL VENOUS BLD VENIPUNCTURE: CPT | Performed by: PEDIATRICS

## 2020-11-02 PROCEDURE — 80053 COMPREHEN METABOLIC PANEL: CPT | Performed by: INTERNAL MEDICINE

## 2020-11-02 PROCEDURE — 120N000003 HC R&B IMCU UMMC

## 2020-11-02 PROCEDURE — 36415 COLL VENOUS BLD VENIPUNCTURE: CPT | Performed by: NURSE PRACTITIONER

## 2020-11-02 PROCEDURE — 250N000011 HC RX IP 250 OP 636: Performed by: INTERNAL MEDICINE

## 2020-11-02 PROCEDURE — 999N000043 HC STATISTIC CTO2 CONT OXYGEN TECH TIME EA 90 MIN

## 2020-11-02 PROCEDURE — 84295 ASSAY OF SERUM SODIUM: CPT | Performed by: NURSE PRACTITIONER

## 2020-11-02 PROCEDURE — 250N000013 HC RX MED GY IP 250 OP 250 PS 637: Performed by: PEDIATRICS

## 2020-11-02 PROCEDURE — 250N000013 HC RX MED GY IP 250 OP 250 PS 637: Performed by: STUDENT IN AN ORGANIZED HEALTH CARE EDUCATION/TRAINING PROGRAM

## 2020-11-02 PROCEDURE — 94640 AIRWAY INHALATION TREATMENT: CPT

## 2020-11-02 PROCEDURE — 97110 THERAPEUTIC EXERCISES: CPT | Mod: GO

## 2020-11-02 PROCEDURE — 999N000215 HC STATISTIC HFNC ADULT NON-CPAP

## 2020-11-02 PROCEDURE — C9113 INJ PANTOPRAZOLE SODIUM, VIA: HCPCS | Performed by: STUDENT IN AN ORGANIZED HEALTH CARE EDUCATION/TRAINING PROGRAM

## 2020-11-02 PROCEDURE — 84295 ASSAY OF SERUM SODIUM: CPT | Performed by: PEDIATRICS

## 2020-11-02 RX ORDER — HYDROCORTISONE 10 MG/1
20 TABLET ORAL DAILY
Status: DISCONTINUED | OUTPATIENT
Start: 2020-11-03 | End: 2020-11-02

## 2020-11-02 RX ORDER — HYDROCORTISONE 5 MG/1
10 TABLET ORAL EVERY 24 HOURS
Status: DISCONTINUED | OUTPATIENT
Start: 2020-11-03 | End: 2020-11-02

## 2020-11-02 RX ORDER — POTASSIUM CHLORIDE 20MEQ/15ML
40 LIQUID (ML) ORAL ONCE
Status: COMPLETED | OUTPATIENT
Start: 2020-11-02 | End: 2020-11-02

## 2020-11-02 RX ADMIN — HYDROCORTISONE 25 MG: 20 TABLET ORAL at 20:04

## 2020-11-02 RX ADMIN — PIPERACILLIN SODIUM AND TAZOBACTAM SODIUM 4.5 G: 4; .5 INJECTION, POWDER, LYOPHILIZED, FOR SOLUTION INTRAVENOUS at 22:00

## 2020-11-02 RX ADMIN — METOCLOPRAMIDE HYDROCHLORIDE 10 MG: 5 SOLUTION ORAL at 13:00

## 2020-11-02 RX ADMIN — Medication 1 PACKET: at 07:02

## 2020-11-02 RX ADMIN — METOCLOPRAMIDE HYDROCHLORIDE 10 MG: 5 SOLUTION ORAL at 18:20

## 2020-11-02 RX ADMIN — MULTIVITAMIN 15 ML: LIQUID ORAL at 10:42

## 2020-11-02 RX ADMIN — CARBAMAZEPINE 150 MG: 100 SUSPENSION ORAL at 13:00

## 2020-11-02 RX ADMIN — HYDROCORTISONE 50 MG: 20 TABLET ORAL at 07:54

## 2020-11-02 RX ADMIN — CALCIUM CARBONATE 1250 MG: 1250 SUSPENSION ORAL at 10:42

## 2020-11-02 RX ADMIN — PIPERACILLIN SODIUM AND TAZOBACTAM SODIUM 4.5 G: 4; .5 INJECTION, POWDER, LYOPHILIZED, FOR SOLUTION INTRAVENOUS at 18:20

## 2020-11-02 RX ADMIN — INSULIN GLARGINE 20 UNITS: 100 INJECTION, SOLUTION SUBCUTANEOUS at 07:54

## 2020-11-02 RX ADMIN — METOCLOPRAMIDE HYDROCHLORIDE 10 MG: 5 SOLUTION ORAL at 22:00

## 2020-11-02 RX ADMIN — VANCOMYCIN HYDROCHLORIDE 1500 MG: 10 INJECTION, POWDER, LYOPHILIZED, FOR SOLUTION INTRAVENOUS at 23:51

## 2020-11-02 RX ADMIN — BRIVARACETAM 100 MG: 10 SOLUTION ORAL at 12:59

## 2020-11-02 RX ADMIN — CALCIUM CARBONATE 1250 MG: 1250 SUSPENSION ORAL at 20:04

## 2020-11-02 RX ADMIN — CALCIUM CARBONATE 1250 MG: 1250 SUSPENSION ORAL at 15:43

## 2020-11-02 RX ADMIN — VANCOMYCIN HYDROCHLORIDE 1500 MG: 10 INJECTION, POWDER, LYOPHILIZED, FOR SOLUTION INTRAVENOUS at 02:07

## 2020-11-02 RX ADMIN — MICONAZOLE NITRATE: 20 POWDER TOPICAL at 07:03

## 2020-11-02 RX ADMIN — BRIVARACETAM 100 MG: 10 SOLUTION ORAL at 22:03

## 2020-11-02 RX ADMIN — Medication 50 MCG: at 10:41

## 2020-11-02 RX ADMIN — POTASSIUM CHLORIDE 40 MEQ: 40 SOLUTION ORAL at 06:52

## 2020-11-02 RX ADMIN — MICONAZOLE NITRATE: 20 POWDER TOPICAL at 20:05

## 2020-11-02 RX ADMIN — CARBAMAZEPINE 150 MG: 100 SUSPENSION ORAL at 23:51

## 2020-11-02 RX ADMIN — SODIUM CHLORIDE SOLN NEBU 3% 3 ML: 3 NEBU SOLN at 08:52

## 2020-11-02 RX ADMIN — SODIUM BICARBONATE 650 MG TABLET 650 MG: at 07:53

## 2020-11-02 RX ADMIN — CARBAMAZEPINE 150 MG: 100 SUSPENSION ORAL at 19:03

## 2020-11-02 RX ADMIN — METOCLOPRAMIDE HYDROCHLORIDE 10 MG: 5 SOLUTION ORAL at 06:51

## 2020-11-02 RX ADMIN — SODIUM CHLORIDE SOLN NEBU 3% 3 ML: 3 NEBU SOLN at 00:04

## 2020-11-02 RX ADMIN — LEVOTHYROXINE SODIUM 150 MCG: 0.15 TABLET ORAL at 06:52

## 2020-11-02 RX ADMIN — PIPERACILLIN SODIUM AND TAZOBACTAM SODIUM 4.5 G: 4; .5 INJECTION, POWDER, LYOPHILIZED, FOR SOLUTION INTRAVENOUS at 10:40

## 2020-11-02 RX ADMIN — CARBAMAZEPINE 150 MG: 100 SUSPENSION ORAL at 06:51

## 2020-11-02 RX ADMIN — PANTOPRAZOLE SODIUM 40 MG: 40 INJECTION, POWDER, FOR SOLUTION INTRAVENOUS at 07:54

## 2020-11-02 RX ADMIN — PIPERACILLIN SODIUM AND TAZOBACTAM SODIUM 4.5 G: 4; .5 INJECTION, POWDER, LYOPHILIZED, FOR SOLUTION INTRAVENOUS at 04:55

## 2020-11-02 RX ADMIN — ENOXAPARIN SODIUM 40 MG: 40 INJECTION SUBCUTANEOUS at 06:52

## 2020-11-02 ASSESSMENT — ACTIVITIES OF DAILY LIVING (ADL)
ADLS_ACUITY_SCORE: 41

## 2020-11-02 NOTE — PROVIDER NOTIFICATION
Notified Dr. CRAWFORD, (endocrine who primary told us to talk to about sodium checks) that 1600 resulted Na just resulted and was 149, writer worried about how fast Na drop is going, requesting change in fluids.    (sent page to Primary an update about this too, at this time now cross cover)    1844- Spoke with Dr. MCKEON, MD is going to contact Endocrine due to concern with the acute change. Awaiting a call to be notified of changed fluids.     1850- Dr. Mckeon called and said to turn of D5 until endocrine calls.

## 2020-11-02 NOTE — PROVIDER NOTIFICATION
11/01/20 2200   Call Information   Date of Call 11/01/20   Time of Call 2147   Name of person requesting the team Seda   Title of person requesting team RN   RRT Arrival time 2149   Time RRT ended 2218   Reason for call   Type of RRT Adult   Primary reason for call Sepsis suspected   Sepsis Suspected Elevated Lactate level;Temperature <95 or >101;RR > 20, SaO2 <90% OR increasing O2 need   Was patient transferred from the ED, ICU, or PACU within last 24 hours prior to RRT call? No   SBAR   Situation Lactic Acid = 2.3   Background Admitted 10/25/2020:  60 year old male with H/O TBI and chronic aspiration admitted for fever and increasing respiratory distress. Intubated in ED. Hypotensive. Septic shock. Placed on 3 pressors NE/vaso/epi for hypotension. CXR with infiltrates c/w pneumonia - likely aspiration. Started on vanco/zosyn. Noted to have ALMAS with Cr 3.3, baseline < 1.   Notable History/Conditions Neurological;Seizures  (hemiplegia, non-verbal, )   Assessment Fever of 101.4; Sinus Rhym. B/P WNL; non-verbal,  on HFNC 40-50% O2 to keep sats >91.  RR - 22-24. Does not appear distressed at this time.       Interventions CXR;Labs;Meds;O2 per N/C or mask   Patient Outcome   Patient Outcome Stabilized on unit   RRT Team   Physician(s) Leticia Pedraza NP   Lead RN Jessica Roger RN   RN Seda Talavera RN   RT Sommer Jimenez RT   Post RRT Intervention Assessment   Post RRT Assessment Stable/Improved   Date Follow Up Done 11/02/20   Time Follow Up Done 0450   Comments repeat Lactic Acid = 1.4
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
## 2020-11-02 NOTE — PROGRESS NOTES
Rapid Response Team Note    Assessment   In assessment a rapid response was called on Keyon Farias due to SIRS/Sepsis trigger.     This presentation is likely due sepsis from aspiration pneumonia. The patient is NOT critically ill at this time.    Sepsis Evaluation   Keyon Farias meets SIRS criteria and has a lactate >2.  These vital sign, lab and physical exam findings are consistent with SEPSIS.    Sepsis Time-Zero (time Sepsis diagnosis confirmed): 1000  11/01/20    Anti-infectives (From now, onward)    Start     Dose/Rate Route Frequency Ordered Stop    11/01/20 2230  piperacillin-tazobactam (ZOSYN) 4.5 g vial to attach to  mL bag      4.5 g  over 30 Minutes Intravenous EVERY 6 HOURS 11/01/20 2201 11/04/20 0429    10/27/20 1900  vancomycin 1500 mg in 0.9% NaCl 250 ml intermittent infusion 1,500 mg      1,500 mg  over 90 Minutes Intravenous EVERY 18 HOURS 10/27/20 1756          Current antibiotic coverage is appropriate for source of infection. (antibiotics broadened now)    Plan   1) Primary team had already seen patient due to hyperthermia prior to rapid response and ordered chest x-ray, blood cultures, procal  2) Broaden antibiotics to vanc/zosyn. Discontinue Cipro  3) repeat lactic acid at 0200  4) Hold additional fluids given ongoing treatment for hypernatremia       Disposition: The patient will remain on the current unit. We will continue to monitor this patient closely..    The Internal Medicine primary team was able to be reached and they are in agreement with the above plan.    Reassessment   Reassessment and plan follow-up will be performed by the primary team. The current agreed upon plan for reassessment/follow-up includes the following labs: lactic acid and will be completed in 4 hours.    Time Spent on this Encounter   Total Critical Care time spent by me, excluding procedures, was 15 minutes.    NATHANIEL Littlejohn CNP  [facility] [place] RRT AMCOM Job Code Contact #[phone]    [facility]  Course   Admission Diagnosis: respiratory distress  Septic shock (H)     Brief Summary of events leading to rapid response:   A rapid response was called for Keyon Farias due to lactic acidosis triggered by sepsis/SIRS BPA at 2150  11/01/20.      The patients is known to have an infection.    Significant Comorbidities:   diabetes insipidus, hypernatremia     Medications   Scheduled     Brivaracetam  100 mg Oral BID     calcium carbonate  1,250 mg Oral TID w/meals     carBAMazepine  150 mg Oral Q6H ALONDRA     ciprofloxacin  400 mg Intravenous Q8H     enoxaparin ANTICOAGULANT  40 mg Subcutaneous Q24H     fiber modular (NUTRISOURCE FIBER)  1 packet Per Feeding Tube Daily     hydrocortisone  50 mg Per G Tube BID     [START ON 11/2/2020] hydrocortisone  10 mg Per G Tube Q24H     [START ON 11/2/2020] hydrocortisone  20 mg Per G Tube Daily     insulin aspart  1-6 Units Subcutaneous Q4H     insulin glargine  20 Units Subcutaneous Daily     levothyroxine  150 mcg Oral QAM AC     metoclopramide  10 mg Oral 4x Daily AC & HS     miconazole   Topical BID     multivitamins w/minerals  15 mL Per Feeding Tube Daily     pantoprazole (PROTONIX) IV  40 mg Intravenous Daily     sodium bicarbonate  650 mg Oral Daily     sodium chloride  3 mL Nebulization Q8H     vancomycin (VANCOCIN) IV  1,500 mg Intravenous Q18H     cholecalciferol  50 mcg Oral Daily      PRN   acetaminophen, acetylcysteine, albuterol, artificial saliva, bacitracin, bisacodyl, dextrose, dextrose, glucose **OR** dextrose **OR** glucagon, melatonin, naloxone, polyethylene glycol, prochlorperazine, senna-docusate **OR** senna-docusate   Allergies   Allergies   Allergen Reactions     Valproic Acid Other (See Comments)     Toxicity w/ bone marrow suspension, elevated ammonia levels      Dilantin [Phenytoin Sodium] Other (See Comments)     Severe Trembling     Scopolamine Hives     Hives with the patch - oral no problem        Physical Exam   Temp: 101.4  F (38.6  C) Temp  Min:  98.1  F (36.7  C)  Max: 101.4  F (38.6  C)  Resp: 22 Resp  Min: 18  Max: 22  SpO2: 94 % SpO2  Min: 91 %  Max: 99 %  Pulse: 70 Pulse  Min: 64  Max: 86    No data recorded  BP: (!) 152/60 Systolic (24hrs), Av , Min:137 , Max:152   Diastolic (24hrs), Av, Min:59, Max:80     I/Os: I/O last 3 completed shifts:  In: 4441.67 [I.V.:2036.67; NG/GT:1195]  Out: 4630 [Urine:4200; Emesis/NG output:430]     Exam:   General: chronically ill appearing  Mental Status: baseline mental status.      Significant Results and Procedures   Lactic Acid:   Recent Labs   Lab Test 11/01/20  2131 10/28/20  1345 10/27/20  1116 10/26/20  1130 20  1256 20  1256 20  0623 20  0623   LACT  --  2.4* 2.9* 2.7*   < >  --    < >  --    LACTS 2.3*  --   --   --   --  0.9  --  0.9    < > = values in this interval not displayed.     CBC:   Recent Labs   Lab Test 20  0440 10/31/20  0407   WBC 11.2* 11.5* 8.9   HGB 9.1* 9.7* 8.5*   HCT 30.0* 32.3* 28.0*    213 175

## 2020-11-02 NOTE — PLAN OF CARE
[facility] cares [id number]:      A: HECTOR orientation, pt opens eyes spontaneously but does not appear to track, non-verbal. Does not appear to move extremities freely, not following commands. VS unchanged, HFNC FiO2 @ 30% frequent suctioning requiring, pt able to cough up a majority of sputum. SR. Low grade fever. No signs of non-verbal pain. No symptoms of nausea. TF via J at goal, G tube to gravity (see flow sheet for I&O's. Frequent oral cares provided. Q2H Na checks, see provider notification note. No new skin deficits noted. Lerma in place, incontinent stool. Mother updated via phone.     I/O this shift:  In: 565 [I.V.:200; NG/GT:200]  Out: 215 [Urine:200; Emesis/NG output:15]    Temp:  [98.1  F (36.7  C)-100.1  F (37.8  C)] 100.1  F (37.8  C)  Pulse:  [64-86] 67  Resp:  [18-20] 18  BP: (137-153)/(59-80) 143/59  FiO2 (%):  [30 %] 30 %  SpO2:  [91 %-99 %] 93 %     R: Continue with POC. Notify primary team with changes.

## 2020-11-02 NOTE — PROGRESS NOTES
CLINICAL NUTRITION SERVICES - REASSESSMENT NOTE     Nutrition Prescription    RECOMMENDATIONS FOR MDs/PROVIDERS TO ORDER:  Total fluid and free water per primary team/endocrinology     Malnutrition Status:    Non-severe malnutrition in the context of acute on chronic illness     Recommendations already ordered by Registered Dietitian (RD):  Nutren 1.5 via jejunostomy @ 55 mL/hr = 1980 kcals (27 kcal/kg/day), 90 g PRO (1.2 g/kg/day), 1003 mL H2O, 232 g CHO and no fiber daily.   + 1 packet Nutrisource fiber     Future/Additional Recommendations:  Continue to monitor tolerance to enteral nutrition, GI function and skin status  At home, patient receives Jevity 1.5 @ 55 ml/hr x 24 hrs at home with 1 packet Neutrasource Fiber daily   --Discussed option to change to fiber containing formula, Isosource 1.5 with patient's mother. No answer during visit.      EVALUATION OF THE PROGRESS TOWARD GOALS   Diet: NPO    Nutrition Support: Nutren 1.5 via jejunostomy @ 55 mL/hr = 1980 kcals (27 kcal/kg/day), 90 g PRO (1.2 g/kg/day), 1003 mL H2O, 232 g CHO and no fiber daily.   + 1 packet Nutrisource fiber     Intake: Average enteral nutrition received 10/26-11/1 provided 738 mL/day, 1107 calories and 50 g protein/day meeting 73% and 55% of low end estimated energy and protein needs.       NEW FINDINGS   Patient and mother in room today. His mother reported patient looked better. She had questions regarding free water at home and how much she should give and if a formula with fiber is better than a formula without or vice versa .    Weight Trends:   11/01/20 0500 71.8 kg (158 lb 4.6 oz)   10/31/20 1912 72.4 kg (159 lb 9.8 oz)   10/30/20 1659 81.5 kg (179 lb 10.8 oz)   10/29/20 0300 73.9 kg (162 lb 14.7 oz)   10/28/20 0000 78.4 kg (172 lb 13.5 oz)   10/27/20 0000 76.4 kg (168 lb 6.9 oz)   10/26/20 0000 73.6 kg (162 lb 4.1 oz)   2.4% weight change in one week     Will maintain dosing weight of 74 kg (admission) and continue to monitor  weight trends closely.     GI: no nausea noted. Last bowel movement, 11/3, 3 stools documented.   Skin: WOCN following for DTPI on coccyx, DTPI on right mid back, and stage 1 PI on r. Posterior groin, PIs present on admission  Labs: Na 147 (endocrine following for possible diabetes insipidus)   Medications: Calcium carbonate, Novolog, Lantus,Levothyroxine, Reglan, Certavite, Protonix    MALNUTRITION  % Intake: < 75% for > 7 days (non-severe)  % Weight Loss: > 2% in 1 week (severe)  Subcutaneous Fat Loss: Facial region:  Mild at orbital vs thin habitus   Muscle Loss: none noted   Fluid Accumulation/Edema: Mild  Malnutrition Diagnosis: Non-severe malnutrition in the context of acute on chronic illness     Previous Goals   Total avg nutritional intake to meet a minimum of 20 kcal/kg and 1.2 g PRO/kg daily (per dosing wt 74 kg).  Evaluation: Not met    Previous Nutrition Diagnosis  Predicted inadequate nutrient intake (calories, protein) related to on TF for nutrition therapy, risk for interruptions to TF infusion.     Evaluation: Declining    CURRENT NUTRITION DIAGNOSIS  Inadequate protein-energy intake related to inadequate enteral nutrition infusion (secondary to needing to hold TF, unclear in notes why goal infusion not met) as evidenced by enteral nutrition per I/O providing an average of 738 ml/day meeting 73% of low end estimated energy needs and 55% of protein needs     INTERVENTIONS  Implementation  Collaboration with other providers  Enteral Nutrition - continue    Goals  Total avg nutritional intake to meet a minimum of 25 kcal/kg and 1.2 g PRO/kg daily (per dosing wt 74kg).    Monitoring/Evaluation  Progress toward goals will be monitored and evaluated per protocol.    Evelyne Anaya RD, KECIA  6B pager: [phone]

## 2020-11-02 NOTE — PLAN OF CARE
Cared for patient from [id number] overnight     Neuro: Intermittently alert/lethargic, no verbal responses or following commands with initial 2 assessments, improved this AM. Slight movement of LUE spontaneously, able to squeeze L hand/show thumbs up this AM. Pupils unequal, reactive. Tmax of 101.4, PRN tylenol given.  Cardiac: SR, HR 60s-90s. SBP 120s-150s/70s-90s.              Respiratory: Sating 92% on 30-50% FiO2 on HFNC, 30L support initially, improved to RA this AM. RR low 20s. Frequent poor cough, thick secretions.  GI/: Adequate urine output, yellow per aaron. Soft formed BM x1. No N/V. G to gravity with moderate tan/thick/yellow output. J tube with TF.   Diet/appetite: Tolerating NPO and TF.  Activity:  Assist of 2/lift assist to reposition.  Pain: Denied pain x1, otherwise no nonverbal indicators of pain.   Skin: No new deficits noted.  Mepilex dressings to sacrum, elbows and heels.  LDA's:  Bilateral PIVs saline locked. 40meq replaced of K+ this AM.      Plan: Continue with POC. Notify primary team with changes.

## 2020-11-02 NOTE — PROGRESS NOTES
"Brief Medicine Note    Cross cover contacted by RN at 6:40 PM that patients Na level was 149 (from 164 this morning). On Free water flushes and D5W infusion managed by Endocrinology consultant. Medicine paged Endocrinology at that time and Endocrine team responded promptly and recommended holding D5W infusion and to continue free water flushes of 200cc every 4hrs given their concern for recurring hypernatremia if held. Given this was acute hypernatremia overnight from history DI or post ATN diuresis and that they had less concern for ODS - however, will need to continue to monitor sodium levels closely every 2 hours.     Medicine will continue to monitor Sodium levels ever 2 hours. No change in neurological exam per RN. If any change in Sodium level (decrease from 149) would recommend calling Endocrine team and go down on free water flushes.     ADDENDUM:      Contacted by nursing regarding fever of 101.4F at 8:58PM. Repeat Na stable at 149.     Upon assessment, patient is unable to answer questions. Is alert and making eye contact. This is at baseline per day team report and RN reports no acute change. Is now on 30L of HFCF with 35% FiO2 increased from 30% FiO2 earlier today. Is NPO for aspiration pneumonia. On Vancomycin and Ciprofloxacin. Has aaron catheter in place draining adequate urine. Has had coarse lung sounds today.     Today's vital signs, medications, and nursing notes were reviewed.      BP (!) 152/60 (BP Location: Right arm)   Pulse 70   Temp 101.4  F (38.6  C) (Axillary)   Resp 22   Ht 1.829 m (6' 0.01\")   Wt 71.8 kg (158 lb 4.6 oz)   SpO2 94%   BMI 21.46 kg/m    GENERAL: Alert. Makes eye contact and follows eyes. Not answering questions. Appears comfortable.   HEENT: Anicteric sclera. Mucous membranes moist.   CV: RRR. S1, S2. No murmurs appreciated.   RESPIRATORY: Effort increased on 35% FiO2 on 30L of HFNC.  Lungs CTAB with no wheezing, rales, rhonchi.   GI: Abdomen soft and non distended, " bowel sounds present. No tenderness, rebound, guarding. PEG tube in place.   MUSCULOSKELETAL: No joint swelling or tenderness.   EXTREMITIES: No peripheral edema. Intact bilateral pedal pulses.   SKIN: No jaundice. No rashes.       A/P:  - Blood cultures x2 ordered  - CXR, Urinalysis ordered  - CBC, CRP and Procal ordered  - Lactic acid ordered per sepsis protocol   - Will consider broadening antibiotics to IV Vanc and Zosyn.     ADDENDUM: Rapid reponse was called. Please see note from RRT provider Jermoe Pedraza CNP. Follow up lactic acid level ordered for 0200. Monitor respiratory status closely. Signed out to medicine moonlighter for ongoing close monitoring.     Christa Mckeon PA-C  Hospitalist Service  Pager [phone]

## 2020-11-03 LAB
ALBUMIN SERPL-MCNC: 2.2 G/DL (ref 3.4–5)
ALP SERPL-CCNC: 99 U/L (ref 40–150)
ALT SERPL W P-5'-P-CCNC: 41 U/L (ref 0–70)
ANION GAP SERPL CALCULATED.3IONS-SCNC: 6 MMOL/L (ref 3–14)
AST SERPL W P-5'-P-CCNC: 25 U/L (ref 0–45)
BASOPHILS # BLD AUTO: 0 10E9/L (ref 0–0.2)
BASOPHILS NFR BLD AUTO: 0.3 %
BILIRUB SERPL-MCNC: 0.3 MG/DL (ref 0.2–1.3)
BUN SERPL-MCNC: 21 MG/DL (ref 7–30)
CALCIUM SERPL-MCNC: 8.1 MG/DL (ref 8.5–10.1)
CHLORIDE SERPL-SCNC: 118 MMOL/L (ref 94–109)
CO2 SERPL-SCNC: 26 MMOL/L (ref 20–32)
CREAT SERPL-MCNC: 0.87 MG/DL (ref 0.66–1.25)
DIFFERENTIAL METHOD BLD: ABNORMAL
EOSINOPHIL # BLD AUTO: 0.5 10E9/L (ref 0–0.7)
EOSINOPHIL NFR BLD AUTO: 5.1 %
ERYTHROCYTE [DISTWIDTH] IN BLOOD BY AUTOMATED COUNT: 15.8 % (ref 10–15)
GFR SERPL CREATININE-BSD FRML MDRD: >90 ML/MIN/{1.73_M2}
GLUCOSE BLDC GLUCOMTR-MCNC: 103 MG/DL (ref 70–99)
GLUCOSE BLDC GLUCOMTR-MCNC: 112 MG/DL (ref 70–99)
GLUCOSE BLDC GLUCOMTR-MCNC: 159 MG/DL (ref 70–99)
GLUCOSE BLDC GLUCOMTR-MCNC: 97 MG/DL (ref 70–99)
GLUCOSE BLDC GLUCOMTR-MCNC: 97 MG/DL (ref 70–99)
GLUCOSE SERPL-MCNC: 113 MG/DL (ref 70–99)
HCT VFR BLD AUTO: 31.9 % (ref 40–53)
HGB BLD-MCNC: 9.7 G/DL (ref 13.3–17.7)
IMM GRANULOCYTES # BLD: 0.2 10E9/L (ref 0–0.4)
IMM GRANULOCYTES NFR BLD: 1.7 %
LYMPHOCYTES # BLD AUTO: 2.5 10E9/L (ref 0.8–5.3)
LYMPHOCYTES NFR BLD AUTO: 26.1 %
MCH RBC QN AUTO: 29.1 PG (ref 26.5–33)
MCHC RBC AUTO-ENTMCNC: 30.4 G/DL (ref 31.5–36.5)
MCV RBC AUTO: 96 FL (ref 78–100)
MONOCYTES # BLD AUTO: 0.9 10E9/L (ref 0–1.3)
MONOCYTES NFR BLD AUTO: 8.9 %
NEUTROPHILS # BLD AUTO: 5.6 10E9/L (ref 1.6–8.3)
NEUTROPHILS NFR BLD AUTO: 57.9 %
NRBC # BLD AUTO: 0 10*3/UL
NRBC BLD AUTO-RTO: 0 /100
OSMOLALITY UR: 204 MMOL/KG (ref 100–1200)
PLATELET # BLD AUTO: 216 10E9/L (ref 150–450)
POTASSIUM SERPL-SCNC: 3.4 MMOL/L (ref 3.4–5.3)
POTASSIUM SERPL-SCNC: 4.5 MMOL/L (ref 3.4–5.3)
PROT SERPL-MCNC: 6.1 G/DL (ref 6.8–8.8)
RBC # BLD AUTO: 3.33 10E12/L (ref 4.4–5.9)
SODIUM SERPL-SCNC: 143 MMOL/L (ref 133–144)
SODIUM SERPL-SCNC: 146 MMOL/L (ref 133–144)
SODIUM SERPL-SCNC: 150 MMOL/L (ref 133–144)
SP GR UR STRIP: 1 (ref 1–1.03)
WBC # BLD AUTO: 9.7 10E9/L (ref 4–11)

## 2020-11-03 PROCEDURE — 250N000013 HC RX MED GY IP 250 OP 250 PS 637: Performed by: PEDIATRICS

## 2020-11-03 PROCEDURE — 250N000013 HC RX MED GY IP 250 OP 250 PS 637: Performed by: STUDENT IN AN ORGANIZED HEALTH CARE EDUCATION/TRAINING PROGRAM

## 2020-11-03 PROCEDURE — 999N001017 HC STATISTIC GLUCOSE BY METER IP

## 2020-11-03 PROCEDURE — 250N000009 HC RX 250: Performed by: PEDIATRICS

## 2020-11-03 PROCEDURE — 83935 ASSAY OF URINE OSMOLALITY: CPT | Performed by: HOSPITALIST

## 2020-11-03 PROCEDURE — 84295 ASSAY OF SERUM SODIUM: CPT | Performed by: INTERNAL MEDICINE

## 2020-11-03 PROCEDURE — 36415 COLL VENOUS BLD VENIPUNCTURE: CPT | Performed by: INTERNAL MEDICINE

## 2020-11-03 PROCEDURE — 120N000003 HC R&B IMCU UMMC

## 2020-11-03 PROCEDURE — 250N000011 HC RX IP 250 OP 636: Performed by: HOSPITALIST

## 2020-11-03 PROCEDURE — 99207 PR CDG-MDM COMPONENT: MEETS LOW - DOWN CODED: CPT | Performed by: STUDENT IN AN ORGANIZED HEALTH CARE EDUCATION/TRAINING PROGRAM

## 2020-11-03 PROCEDURE — G0463 HOSPITAL OUTPT CLINIC VISIT: HCPCS

## 2020-11-03 PROCEDURE — 272N000078 HC NUTRITION PRODUCT INTERMEDIATE LITER

## 2020-11-03 PROCEDURE — 81003 URINALYSIS AUTO W/O SCOPE: CPT | Performed by: HOSPITALIST

## 2020-11-03 PROCEDURE — 250N000013 HC RX MED GY IP 250 OP 250 PS 637: Performed by: INTERNAL MEDICINE

## 2020-11-03 PROCEDURE — C9113 INJ PANTOPRAZOLE SODIUM, VIA: HCPCS | Performed by: STUDENT IN AN ORGANIZED HEALTH CARE EDUCATION/TRAINING PROGRAM

## 2020-11-03 PROCEDURE — 85025 COMPLETE CBC W/AUTO DIFF WBC: CPT | Performed by: INTERNAL MEDICINE

## 2020-11-03 PROCEDURE — 84132 ASSAY OF SERUM POTASSIUM: CPT | Performed by: INTERNAL MEDICINE

## 2020-11-03 PROCEDURE — 999N000157 HC STATISTIC RCP TIME EA 10 MIN

## 2020-11-03 PROCEDURE — 94640 AIRWAY INHALATION TREATMENT: CPT | Mod: 76

## 2020-11-03 PROCEDURE — 99232 SBSQ HOSP IP/OBS MODERATE 35: CPT | Mod: GC | Performed by: INTERNAL MEDICINE

## 2020-11-03 PROCEDURE — 99232 SBSQ HOSP IP/OBS MODERATE 35: CPT | Performed by: STUDENT IN AN ORGANIZED HEALTH CARE EDUCATION/TRAINING PROGRAM

## 2020-11-03 PROCEDURE — 80053 COMPREHEN METABOLIC PANEL: CPT | Performed by: INTERNAL MEDICINE

## 2020-11-03 PROCEDURE — 250N000011 HC RX IP 250 OP 636: Performed by: STUDENT IN AN ORGANIZED HEALTH CARE EDUCATION/TRAINING PROGRAM

## 2020-11-03 RX ORDER — DESMOPRESSIN ACETATE 4 UG/ML
1 INJECTION, SOLUTION INTRAVENOUS; SUBCUTANEOUS ONCE
Status: COMPLETED | OUTPATIENT
Start: 2020-11-03 | End: 2020-11-03

## 2020-11-03 RX ORDER — HYDROCORTISONE 10 MG/1
20 TABLET ORAL EVERY MORNING
Status: DISCONTINUED | OUTPATIENT
Start: 2020-11-04 | End: 2020-11-06 | Stop reason: HOSPADM

## 2020-11-03 RX ORDER — HYDROCORTISONE 10 MG/1
10 TABLET ORAL
Status: DISCONTINUED | OUTPATIENT
Start: 2020-11-03 | End: 2020-11-06 | Stop reason: HOSPADM

## 2020-11-03 RX ORDER — POTASSIUM CHLORIDE 1.5 G/1.58G
40 POWDER, FOR SOLUTION ORAL ONCE
Status: COMPLETED | OUTPATIENT
Start: 2020-11-03 | End: 2020-11-03

## 2020-11-03 RX ADMIN — SODIUM CHLORIDE SOLN NEBU 3% 3 ML: 3 NEBU SOLN at 00:24

## 2020-11-03 RX ADMIN — BRIVARACETAM 100 MG: 10 SOLUTION ORAL at 21:08

## 2020-11-03 RX ADMIN — PANTOPRAZOLE SODIUM 40 MG: 40 INJECTION, POWDER, FOR SOLUTION INTRAVENOUS at 07:57

## 2020-11-03 RX ADMIN — ENOXAPARIN SODIUM 40 MG: 40 INJECTION SUBCUTANEOUS at 06:05

## 2020-11-03 RX ADMIN — CALCIUM CARBONATE 1250 MG: 1250 SUSPENSION ORAL at 07:57

## 2020-11-03 RX ADMIN — SODIUM BICARBONATE 650 MG TABLET 650 MG: at 07:58

## 2020-11-03 RX ADMIN — DESMOPRESSIN ACETATE 1 MCG: 4 SOLUTION INTRAVENOUS at 02:29

## 2020-11-03 RX ADMIN — POTASSIUM CHLORIDE 40 MEQ: 1.5 POWDER, FOR SOLUTION ORAL at 15:54

## 2020-11-03 RX ADMIN — INSULIN ASPART 1 UNITS: 100 INJECTION, SOLUTION INTRAVENOUS; SUBCUTANEOUS at 04:11

## 2020-11-03 RX ADMIN — MICONAZOLE NITRATE: 20 POWDER TOPICAL at 19:24

## 2020-11-03 RX ADMIN — CALCIUM CARBONATE 1250 MG: 1250 SUSPENSION ORAL at 18:04

## 2020-11-03 RX ADMIN — CALCIUM CARBONATE 1250 MG: 1250 SUSPENSION ORAL at 12:46

## 2020-11-03 RX ADMIN — INSULIN GLARGINE 20 UNITS: 100 INJECTION, SOLUTION SUBCUTANEOUS at 07:58

## 2020-11-03 RX ADMIN — METOCLOPRAMIDE HYDROCHLORIDE 10 MG: 5 SOLUTION ORAL at 15:54

## 2020-11-03 RX ADMIN — METOCLOPRAMIDE HYDROCHLORIDE 10 MG: 5 SOLUTION ORAL at 22:11

## 2020-11-03 RX ADMIN — HYDROCORTISONE 25 MG: 20 TABLET ORAL at 19:32

## 2020-11-03 RX ADMIN — METOCLOPRAMIDE HYDROCHLORIDE 10 MG: 5 SOLUTION ORAL at 07:57

## 2020-11-03 RX ADMIN — HYDROCORTISONE 25 MG: 20 TABLET ORAL at 14:21

## 2020-11-03 RX ADMIN — DEXTROSE MONOHYDRATE 500 ML: 50 INJECTION, SOLUTION INTRAVENOUS at 02:29

## 2020-11-03 RX ADMIN — HYDROCORTISONE 10 MG: 10 TABLET ORAL at 18:04

## 2020-11-03 RX ADMIN — SODIUM CHLORIDE SOLN NEBU 3% 3 ML: 3 NEBU SOLN at 07:33

## 2020-11-03 RX ADMIN — LEVOTHYROXINE SODIUM 150 MCG: 0.15 TABLET ORAL at 07:58

## 2020-11-03 RX ADMIN — CARBAMAZEPINE 150 MG: 100 SUSPENSION ORAL at 06:05

## 2020-11-03 RX ADMIN — MULTIVITAMIN 15 ML: LIQUID ORAL at 07:57

## 2020-11-03 RX ADMIN — HYDROCORTISONE 25 MG: 20 TABLET ORAL at 07:57

## 2020-11-03 RX ADMIN — CARBAMAZEPINE 150 MG: 100 SUSPENSION ORAL at 12:46

## 2020-11-03 RX ADMIN — CARBAMAZEPINE 150 MG: 100 SUSPENSION ORAL at 18:04

## 2020-11-03 RX ADMIN — BRIVARACETAM 100 MG: 10 SOLUTION ORAL at 10:49

## 2020-11-03 RX ADMIN — METOCLOPRAMIDE HYDROCHLORIDE 10 MG: 5 SOLUTION ORAL at 12:46

## 2020-11-03 RX ADMIN — Medication 50 MCG: at 07:58

## 2020-11-03 RX ADMIN — MICONAZOLE NITRATE: 20 POWDER TOPICAL at 08:01

## 2020-11-03 ASSESSMENT — MIFFLIN-ST. JEOR: SCORE: 1558.13

## 2020-11-03 ASSESSMENT — ACTIVITIES OF DAILY LIVING (ADL)
ADLS_ACUITY_SCORE: 41
ADLS_ACUITY_SCORE: 39
ADLS_ACUITY_SCORE: 41

## 2020-11-03 NOTE — PLAN OF CARE
Neuro: Alert, unable to assess orientation. Inconsistently follows commands.   Cardiac: Sinus arrhythmia. VSS.   Respiratory: Sating > 95% on RA. Lung sounds coarse. Frequent weak, loose cough present. Unable to suction anything out orally.   GI/: UOP measured hourly. Notified MD at 0100 of increased UOP and sodium level, gave 1 mcg DDAVP and D5W 500 ml bolus. No BM this shift.  Diet/appetite: NPO. Tolerating TF at goal rate.   Activity:  Repo Q 2. OOB with lift.   Pain: At acceptable level on current regimen. Appears comfortable.   Skin: No new deficits noted.  LDA's: PIV x 2, aaron, GJ tube.     Plan: Continue with POC. Notify primary team with changes.

## 2020-11-03 NOTE — PROGRESS NOTES
[facility] Nurse Inpatient Pressure Injury Assessment   Reason for consultation: Evaluate and treat coccyx      ASSESSMENT  Pressure Injury: on coccyx , present on admission ,   Pressure Injury is Stage Deep Tissue Pressure Injury (DTPI)   Contributing factor of the pressure injury: pressure, shear, immobility and moisture  Status: evolving     Pressure Injury: on right mid back , present on admission ,   This is a Medical Device Related Pressure Injury (MDRPI) due to unknown  Pressure Injury is Stage Deep Tissue Pressure Injury (DTPI)   Contributing factor of the pressure injury: pressure, shear and immobility  Status: stable    Pressure Injury: on right posterior groin , present on admission ,   This is a Medical Device Related Pressure Injury (MDRPI) due to IV tubing  Pressure Injury is Stage 1   Contributing factor of the pressure injury: pressure, shear and immobility  Status: stable  Recommend provider order: None, at this time     TREATMENT PLAN  Back, coccyx, right posterior groin wound: Every 3 days and PRN cleanse with saline and pat dry. Paint memo wound skin with no skin. Conform mepilex over wound. If dressing becomes soiled more than 1x per shift switch to incontinence protocol.   Orders Written  WO Nurse follow-up plan:weekly  Nursing to notify the Provider(s) and re-consult the WO Nurse if wound(s) deteriorates or new skin concern.    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    Objective Data  Containment of urine/stool: Incontinent  pad in bed    Current Diet/ Nutrition:  Orders Placed This Encounter      NPO for Medical/Clinical Reasons Except for: Other; Specify: Can give meds through J tube    Output:   I/O last 3 completed shifts:  In: 3105 [I.V.:350; NG/GT:935; IV Piggyback:500]  Out: 5835 [Urine:5170; Emesis/NG output:665]    Risk Assessment:   Sensory Perception: 2-->very limited  Moisture: 3-->occasionally moist  Activity: 2-->chairfast  Mobility: 2-->very limited  Nutrition: 3-->adequate  Friction and Shear: 2-->potential problem  Ricci Score: 14    Labs:   Recent Labs   Lab 11/03/20  0504 11/01/20  2131 11/01/20 2131   ALBUMIN 2.2*   < >  --    HGB 9.7*   < > 9.1*   WBC 9.7   < > 11.2*   CRP  --   --  12.0*    < > = values in this interval not displayed.       Physical Exam  Skin inspection: focused back, coccyx, posterior legs    Wound Location:  Sacrum     10/26       11/3  Date of last Photo 11/3  Wound History: POA  Measurements (length x width x depth, in cm) open area 7.5 x 1 x 0.1 cm small area lower down 0.5 x 0.1x 0.1 TOTAL area 11 cm x 8 cm  x  0 cm - blanching  Wound Base:  100 % erythema, maroon, dermis and fibrin  Tunneling N/A  Undermining N/A  Palpation of the wound bed: normal   Periwound skin: erythema- blanchable  Color: red  Temperature: normal   Drainage:, none  Description of drainage: none  Odor: none  Pain: no grimacing or signs of discomfort, none     Wound Location:  Right mid back         10/26       11/3  Date of last Photo 11/3  Wound History: POA  Measurements (length x width x depth, in cm) 2 cm x 1.5 cm  x  0 cm; upper line= 2.5 x 0.3x 0 cm  Wound Base:  100 % non-blanchable, erythema, dermis  Tunneling N/A  Undermining N/A  Palpation of the wound bed: normal   Periwound skin: intact  Color: normal and consistent with surrounding tissue  Temperature: normal   Drainage:, none  Description of drainage: none  Odor: none  Pain: no grimacing or signs of discomfort, none    Wound Location:  Right posterior  leg/groin     10/26       11/3  Date of last Photo 11/3  Wound History: POA  Measurements (length x width x depth, in cm) 2 cm x 0.3 cm  x  0 cm   Wound Base:  100 % non-blanchable and erythema   Tunneling N/A  Undermining N/A  Palpation of the wound bed: normal   Periwound skin: intact  Color: normal and consistent with surrounding tissue  Temperature: normal   Drainage:, none  Description of drainage: none  Odor: none  Pain: no grimacing or signs of discomfort, none    Interventions  Current support surface: Standard  Low air loss mattress with pulsation   Current off-loading measures: Pillows and Wedge positioning system  Repositioning aid: Turn and Position System and Pillows  Visual inspection of wound(s) completed   Wound Care: was done per plan of care.  Supplies: discussed with RN  Educated provided: importance of repositioning and plan of care  Education provided to: nurse  Discussed importance of:their role in pressure injury prevention  Discussed plan of care with Nurse    Kelsey Lucas RN, CWOCN

## 2020-11-03 NOTE — PHARMACY-VANCOMYCIN DOSING SERVICE
Pharmacy Vancomycin Note  Date of Service 2020  Patient's  [id number]   60 year old, male    Indication: Aspiration Pneumonia  Goal Trough Level: 15-20 mg/L  Day of Therapy: 9  Current Vancomycin regimen:  1500 mg IV q18h    Current estimated CrCl = Estimated Creatinine Clearance: 99.7 mL/min (based on SCr of 0.82 mg/dL).    Creatinine for last 3 days  10/31/2020:  4:07 AM Creatinine 0.89 mg/dL;  4:10 PM Creatinine 0.99 mg/dL  2020:  4:40 AM Creatinine 0.93 mg/dL  2020:  4:53 AM Creatinine 0.82 mg/dL    Recent Vancomycin Levels (past 3 days)  2020:  8:04 PM Vancomycin Level 16.3 mg/L    Vancomycin IV Administrations (past 72 hours)                   vancomycin 1500 mg in 0.9% NaCl 250 ml intermittent infusion 1,500 mg (mg) 1,500 mg Given 20 0207     1,500 mg Given 20 0945     1,500 mg Given 10/31/20 1531                Nephrotoxins and other renal medications (From now, onward)    Start     Dose/Rate Route Frequency Ordered Stop    10/27/20 1900  vancomycin 1500 mg in 0.9% NaCl 250 ml intermittent infusion 1,500 mg      1,500 mg  over 90 Minutes Intravenous EVERY 18 HOURS 10/27/20 1756               Contrast Orders - past 72 hours (72h ago, onward)    None          Interpretation of levels and current regimen:  Trough level is  Therapeutic    Has serum creatinine changed > 50% in last 72 hours: No    Urine output:  good urine output    Renal Function: Stable    Plan:  1.  Continue Current Dose  2.  Pharmacy will check trough levels as appropriate in 3-5 Days.    3. Serum creatinine levels will be ordered daily for the first week of therapy and at least twice weekly for subsequent weeks.      Anthony Nguyen [facility]        .

## 2020-11-03 NOTE — PROGRESS NOTES
[facility]  Palliative Care Sign-Off Note    Palliative Care was consulted for sx and goals of care questions. At this time the patient's family is declining PC services.  Due to this we are signing off.    However we know questions may yet arise - please re-consult us if we can be helpful at any time in the future.     Thank you for the opportunity to be involved in the care of this patient.    Mark ARMSTRONG NP ACHPN  Nurse Practitioner- Lead Advanced Practice Provider  [facility] Palliative Medicine Consult Service   [phone]

## 2020-11-03 NOTE — PROGRESS NOTES
"[facility] [place]     Endocrinology consult- follow up    Date: November 3, 2020    ASSESSMENT/PLAN    1. Hypernatremia  H/o hypernatremia in 2017 and 2019's hospitalization.   He was on DDAVP briefly in 2017 for Diabetes insipidus but weaned off. Not on home DDAVP for many years.   [place] [facility] [place], which could explain why he was not DDAVP  He likely has possible partial central diabetes given low urine osmolality in a setting of hypernatremia and improvement of hypernatremia and urine osm to DDAVP   Given worsening of Na level and urine output while off DDAVP, he would likely require DDAVP treatment from now  We were worried about risk of hyponatremia as patient might not be able to limit water intake to thirst while on DDAVP  We are reassured after confirming with mother that patient lives in a controlled environment with mother and he is dependant on her for nutrition and hydration needs.   Plan  Check Na NOW. Ordered for now  Then, check Na levels every q8 hour  Monitor urine output every q4 hour  If Na level at bedtime is > 145, please give one dose of Desmopressin 100mcg.       2. Secondary adrenal insufficiency  Home regimen: Hydrocotisone (HC) 20mg in the AM, 10mg with dinner  Currently on HC 25 mg TID  -Continue stress dose steroid until patient is felt back to baseline for now    3. Central hypothyroidism   Cont  mcg daily   Will check FT4 when pt is stable    4. Central hypogonadism   Testosterone can be restarted as outpt    History of Presenting Illness    Interval history  Urine output: 6.6L/d >> 2.2L/d >> 4.6L/d on 11/3  Na gradually trended up to 152 this morning   Received 1 mcg of desmopressin on 11/3/2020 at 0229      Physical Exam  /74 (BP Location: Left arm)   Pulse 89   Temp 98.8  F (37.1  C) (Axillary)   Resp 16   Ht 1.829 m (6' 0.01\")   Wt 70 kg (154 lb 5.2 oz)   SpO2 94%   BMI 20.93 kg/m      Physical examination " not performed due to COVID-19 restriction.     DATA REVIEW    Results for BERT BARAJAS (MRN [id number]) as of 11/3/2020 15:47   Ref. Range 11/2/2020 06:22 11/2/2020 08:06 11/2/2020 11:38 11/2/2020 14:28 11/2/2020 16:49 11/2/2020 23:36 11/3/2020 02:00 11/3/2020 05:04 11/3/2020 15:02   Sodium Latest Ref Range: 133 - 144 mmol/L 148 (H) 147 (H) 147 (H) 148 (H) 149 (H) 152 (H)  150 (H) 146 (H)         Praveena Kang   Endocrinology Fellow PGY-4  Discussed with staff endocrinologist.
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
## 2020-11-03 NOTE — PLAN OF CARE
Neuro: Unable to assess orientation, alert, intermittently follows commands   Cardiac: SR. VSS.   Respiratory: Sating >92% on RA.  GI/: Adequate urine output via Lerma. No BM this shift.  Diet/appetite: Remains NPO, on tube feedings per ordered rate.  Activity:  Assist of 2 and lift  up to chair.  Pain: At acceptable level on current regimen.   Skin: No new deficits noted. Dressings changed per WOC nurse.  LDA's: PIV x1    Plan: Potassium replaced, need recheck tonight, trend sodium, plan to Continue with POC. Notify primary team with changes.

## 2020-11-03 NOTE — PLAN OF CARE
Neuro: Difficult to assess orientation status. Intermittently following commands. Able to give a thumbs up with L hand. Smiles occasionally.   Cardiac: Sinus hector/Sinus Rhythm. VSS.  Respiratory: Sating >92% on RA.  GI/: Adequate urine output via patent aaron, measure urine output Q1H. If greater than 300 for 2-3 hrs, notify MD. Geoffrey BM X1.   Diet/appetite: G to gravity, Jtube with TF at 55 mL/hr, 100 mL FWF Q4H. NPO.   Activity:  Assist of 2 with turns and repos.  Pain: At acceptable level on current regimen. Appears comfortable.   Skin: No new deficits noted. Multiple pressure injuries.   LDA's: PIV x2, aaron cath, GJ tube.     Plan: Continue with POC. Notify primary team with changes.
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
## 2020-11-04 ENCOUNTER — APPOINTMENT (OUTPATIENT)
Dept: OCCUPATIONAL THERAPY | Facility: CLINIC | Age: 60
DRG: 871 | End: 2020-11-04
Attending: INTERNAL MEDICINE
Payer: MEDICARE

## 2020-11-04 LAB
ALBUMIN SERPL-MCNC: 2.2 G/DL (ref 3.4–5)
ALP SERPL-CCNC: 95 U/L (ref 40–150)
ALT SERPL W P-5'-P-CCNC: 37 U/L (ref 0–70)
ANION GAP SERPL CALCULATED.3IONS-SCNC: 5 MMOL/L (ref 3–14)
AST SERPL W P-5'-P-CCNC: 23 U/L (ref 0–45)
BASOPHILS # BLD AUTO: 0 10E9/L (ref 0–0.2)
BASOPHILS NFR BLD AUTO: 0.3 %
BILIRUB SERPL-MCNC: 0.3 MG/DL (ref 0.2–1.3)
BUN SERPL-MCNC: 24 MG/DL (ref 7–30)
CALCIUM SERPL-MCNC: 8.3 MG/DL (ref 8.5–10.1)
CHLORIDE SERPL-SCNC: 110 MMOL/L (ref 94–109)
CO2 SERPL-SCNC: 26 MMOL/L (ref 20–32)
CREAT SERPL-MCNC: 0.78 MG/DL (ref 0.66–1.25)
DIFFERENTIAL METHOD BLD: ABNORMAL
EOSINOPHIL # BLD AUTO: 0.4 10E9/L (ref 0–0.7)
EOSINOPHIL NFR BLD AUTO: 2.6 %
ERYTHROCYTE [DISTWIDTH] IN BLOOD BY AUTOMATED COUNT: 16 % (ref 10–15)
GFR SERPL CREATININE-BSD FRML MDRD: >90 ML/MIN/{1.73_M2}
GLUCOSE BLDC GLUCOMTR-MCNC: 100 MG/DL (ref 70–99)
GLUCOSE BLDC GLUCOMTR-MCNC: 104 MG/DL (ref 70–99)
GLUCOSE BLDC GLUCOMTR-MCNC: 107 MG/DL (ref 70–99)
GLUCOSE BLDC GLUCOMTR-MCNC: 110 MG/DL (ref 70–99)
GLUCOSE BLDC GLUCOMTR-MCNC: 77 MG/DL (ref 70–99)
GLUCOSE BLDC GLUCOMTR-MCNC: 95 MG/DL (ref 70–99)
GLUCOSE BLDC GLUCOMTR-MCNC: 97 MG/DL (ref 70–99)
GLUCOSE SERPL-MCNC: 98 MG/DL (ref 70–99)
HCT VFR BLD AUTO: 33.6 % (ref 40–53)
HGB BLD-MCNC: 10.2 G/DL (ref 13.3–17.7)
IMM GRANULOCYTES # BLD: 0.1 10E9/L (ref 0–0.4)
IMM GRANULOCYTES NFR BLD: 0.9 %
LACTATE BLD-SCNC: 1.9 MMOL/L (ref 0.7–2)
LYMPHOCYTES # BLD AUTO: 3.2 10E9/L (ref 0.8–5.3)
LYMPHOCYTES NFR BLD AUTO: 23.8 %
MCH RBC QN AUTO: 29.3 PG (ref 26.5–33)
MCHC RBC AUTO-ENTMCNC: 30.4 G/DL (ref 31.5–36.5)
MCV RBC AUTO: 97 FL (ref 78–100)
MONOCYTES # BLD AUTO: 0.9 10E9/L (ref 0–1.3)
MONOCYTES NFR BLD AUTO: 6.6 %
NEUTROPHILS # BLD AUTO: 8.8 10E9/L (ref 1.6–8.3)
NEUTROPHILS NFR BLD AUTO: 65.8 %
NRBC # BLD AUTO: 0 10*3/UL
NRBC BLD AUTO-RTO: 0 /100
PLATELET # BLD AUTO: 243 10E9/L (ref 150–450)
POTASSIUM SERPL-SCNC: 3.5 MMOL/L (ref 3.4–5.3)
PROT SERPL-MCNC: 6.3 G/DL (ref 6.8–8.8)
RBC # BLD AUTO: 3.48 10E12/L (ref 4.4–5.9)
SODIUM SERPL-SCNC: 141 MMOL/L (ref 133–144)
SODIUM SERPL-SCNC: 142 MMOL/L (ref 133–144)
SODIUM SERPL-SCNC: 146 MMOL/L (ref 133–144)
WBC # BLD AUTO: 13.4 10E9/L (ref 4–11)

## 2020-11-04 PROCEDURE — 83735 ASSAY OF MAGNESIUM: CPT | Performed by: INTERNAL MEDICINE

## 2020-11-04 PROCEDURE — 93010 ELECTROCARDIOGRAM REPORT: CPT | Performed by: INTERNAL MEDICINE

## 2020-11-04 PROCEDURE — 94640 AIRWAY INHALATION TREATMENT: CPT | Mod: 76

## 2020-11-04 PROCEDURE — 83605 ASSAY OF LACTIC ACID: CPT | Performed by: INTERNAL MEDICINE

## 2020-11-04 PROCEDURE — 85025 COMPLETE CBC W/AUTO DIFF WBC: CPT | Performed by: INTERNAL MEDICINE

## 2020-11-04 PROCEDURE — 80053 COMPREHEN METABOLIC PANEL: CPT | Performed by: INTERNAL MEDICINE

## 2020-11-04 PROCEDURE — 250N000011 HC RX IP 250 OP 636: Performed by: STUDENT IN AN ORGANIZED HEALTH CARE EDUCATION/TRAINING PROGRAM

## 2020-11-04 PROCEDURE — 120N000003 HC R&B IMCU UMMC

## 2020-11-04 PROCEDURE — 250N000013 HC RX MED GY IP 250 OP 250 PS 637: Performed by: STUDENT IN AN ORGANIZED HEALTH CARE EDUCATION/TRAINING PROGRAM

## 2020-11-04 PROCEDURE — 36415 COLL VENOUS BLD VENIPUNCTURE: CPT | Performed by: PHYSICIAN ASSISTANT

## 2020-11-04 PROCEDURE — 250N000013 HC RX MED GY IP 250 OP 250 PS 637: Performed by: INTERNAL MEDICINE

## 2020-11-04 PROCEDURE — 94640 AIRWAY INHALATION TREATMENT: CPT

## 2020-11-04 PROCEDURE — 250N000009 HC RX 250

## 2020-11-04 PROCEDURE — 272N000078 HC NUTRITION PRODUCT INTERMEDIATE LITER

## 2020-11-04 PROCEDURE — 84295 ASSAY OF SERUM SODIUM: CPT | Performed by: PHYSICIAN ASSISTANT

## 2020-11-04 PROCEDURE — 36415 COLL VENOUS BLD VENIPUNCTURE: CPT | Performed by: INTERNAL MEDICINE

## 2020-11-04 PROCEDURE — 999N000157 HC STATISTIC RCP TIME EA 10 MIN

## 2020-11-04 PROCEDURE — 93005 ELECTROCARDIOGRAM TRACING: CPT

## 2020-11-04 PROCEDURE — 97530 THERAPEUTIC ACTIVITIES: CPT | Mod: GO | Performed by: OCCUPATIONAL THERAPIST

## 2020-11-04 PROCEDURE — 999N001017 HC STATISTIC GLUCOSE BY METER IP

## 2020-11-04 PROCEDURE — 97110 THERAPEUTIC EXERCISES: CPT | Mod: GO | Performed by: OCCUPATIONAL THERAPIST

## 2020-11-04 PROCEDURE — 99232 SBSQ HOSP IP/OBS MODERATE 35: CPT | Performed by: STUDENT IN AN ORGANIZED HEALTH CARE EDUCATION/TRAINING PROGRAM

## 2020-11-04 PROCEDURE — C9113 INJ PANTOPRAZOLE SODIUM, VIA: HCPCS | Performed by: STUDENT IN AN ORGANIZED HEALTH CARE EDUCATION/TRAINING PROGRAM

## 2020-11-04 PROCEDURE — 250N000009 HC RX 250: Performed by: STUDENT IN AN ORGANIZED HEALTH CARE EDUCATION/TRAINING PROGRAM

## 2020-11-04 RX ORDER — ALBUTEROL SULFATE 0.83 MG/ML
SOLUTION RESPIRATORY (INHALATION)
Status: COMPLETED
Start: 2020-11-04 | End: 2020-11-04

## 2020-11-04 RX ORDER — DESMOPRESSIN ACETATE 0.1 MG/1
100 TABLET ORAL EVERY 12 HOURS
Status: DISCONTINUED | OUTPATIENT
Start: 2020-11-04 | End: 2020-11-06 | Stop reason: HOSPADM

## 2020-11-04 RX ORDER — ALBUTEROL SULFATE 0.83 MG/ML
2.5 SOLUTION RESPIRATORY (INHALATION)
Status: DISCONTINUED | OUTPATIENT
Start: 2020-11-04 | End: 2020-11-06 | Stop reason: HOSPADM

## 2020-11-04 RX ORDER — POTASSIUM CHLORIDE 1.5 G/1.58G
20 POWDER, FOR SOLUTION ORAL ONCE
Status: COMPLETED | OUTPATIENT
Start: 2020-11-04 | End: 2020-11-04

## 2020-11-04 RX ORDER — ACETYLCYSTEINE 200 MG/ML
2 SOLUTION ORAL; RESPIRATORY (INHALATION) 4 TIMES DAILY
Status: DISCONTINUED | OUTPATIENT
Start: 2020-11-04 | End: 2020-11-06 | Stop reason: HOSPADM

## 2020-11-04 RX ORDER — SODIUM CHLORIDE FOR INHALATION 3 %
3 VIAL, NEBULIZER (ML) INHALATION
Status: DISCONTINUED | OUTPATIENT
Start: 2020-11-04 | End: 2020-11-06 | Stop reason: HOSPADM

## 2020-11-04 RX ORDER — ALBUTEROL SULFATE 5 MG/ML
2.5 SOLUTION RESPIRATORY (INHALATION)
Status: DISCONTINUED | OUTPATIENT
Start: 2020-11-04 | End: 2020-11-04

## 2020-11-04 RX ADMIN — POTASSIUM CHLORIDE 20 MEQ: 1.5 POWDER, FOR SOLUTION ORAL at 08:34

## 2020-11-04 RX ADMIN — CALCIUM CARBONATE 1250 MG: 1250 SUSPENSION ORAL at 17:47

## 2020-11-04 RX ADMIN — Medication 50 MCG: at 08:34

## 2020-11-04 RX ADMIN — METOCLOPRAMIDE HYDROCHLORIDE 10 MG: 5 SOLUTION ORAL at 12:32

## 2020-11-04 RX ADMIN — CARBAMAZEPINE 150 MG: 100 SUSPENSION ORAL at 06:57

## 2020-11-04 RX ADMIN — LEVOTHYROXINE SODIUM 150 MCG: 0.15 TABLET ORAL at 08:34

## 2020-11-04 RX ADMIN — DESMOPRESSIN ACETATE 100 MCG: 0.1 TABLET ORAL at 13:39

## 2020-11-04 RX ADMIN — CARBAMAZEPINE 150 MG: 100 SUSPENSION ORAL at 12:32

## 2020-11-04 RX ADMIN — CARBAMAZEPINE 150 MG: 100 SUSPENSION ORAL at 00:26

## 2020-11-04 RX ADMIN — MICONAZOLE NITRATE: 20 POWDER TOPICAL at 20:20

## 2020-11-04 RX ADMIN — SODIUM BICARBONATE 650 MG TABLET 650 MG: at 08:34

## 2020-11-04 RX ADMIN — BRIVARACETAM 100 MG: 10 SOLUTION ORAL at 08:34

## 2020-11-04 RX ADMIN — BRIVARACETAM 100 MG: 10 SOLUTION ORAL at 20:19

## 2020-11-04 RX ADMIN — ALBUTEROL SULFATE 2.5 MG: 2.5 SOLUTION RESPIRATORY (INHALATION) at 13:21

## 2020-11-04 RX ADMIN — ENOXAPARIN SODIUM 40 MG: 40 INJECTION SUBCUTANEOUS at 06:57

## 2020-11-04 RX ADMIN — HYDROCORTISONE 10 MG: 10 TABLET ORAL at 17:47

## 2020-11-04 RX ADMIN — ACETYLCYSTEINE 2 ML: 200 SOLUTION ORAL; RESPIRATORY (INHALATION) at 20:46

## 2020-11-04 RX ADMIN — CARBAMAZEPINE 150 MG: 100 SUSPENSION ORAL at 23:39

## 2020-11-04 RX ADMIN — ALBUTEROL SULFATE 2.5 MG: 2.5 SOLUTION RESPIRATORY (INHALATION) at 20:46

## 2020-11-04 RX ADMIN — METOCLOPRAMIDE HYDROCHLORIDE 10 MG: 5 SOLUTION ORAL at 23:38

## 2020-11-04 RX ADMIN — METOCLOPRAMIDE HYDROCHLORIDE 10 MG: 5 SOLUTION ORAL at 08:34

## 2020-11-04 RX ADMIN — INSULIN GLARGINE 20 UNITS: 100 INJECTION, SOLUTION SUBCUTANEOUS at 08:34

## 2020-11-04 RX ADMIN — PANTOPRAZOLE SODIUM 40 MG: 40 INJECTION, POWDER, FOR SOLUTION INTRAVENOUS at 08:34

## 2020-11-04 RX ADMIN — CALCIUM CARBONATE 1250 MG: 1250 SUSPENSION ORAL at 12:32

## 2020-11-04 RX ADMIN — POLYETHYLENE GLYCOL 3350 17 G: 17 POWDER, FOR SOLUTION ORAL at 23:38

## 2020-11-04 RX ADMIN — HYDROCORTISONE 20 MG: 10 TABLET ORAL at 08:35

## 2020-11-04 RX ADMIN — CALCIUM CARBONATE 1250 MG: 1250 SUSPENSION ORAL at 08:34

## 2020-11-04 RX ADMIN — Medication 6 MG: at 23:38

## 2020-11-04 RX ADMIN — Medication 1 PACKET: at 08:35

## 2020-11-04 RX ADMIN — METOCLOPRAMIDE HYDROCHLORIDE 10 MG: 5 SOLUTION ORAL at 16:05

## 2020-11-04 RX ADMIN — MICONAZOLE NITRATE: 20 POWDER TOPICAL at 08:35

## 2020-11-04 RX ADMIN — ACETYLCYSTEINE 2 ML: 200 SOLUTION ORAL; RESPIRATORY (INHALATION) at 13:21

## 2020-11-04 RX ADMIN — MULTIVITAMIN 15 ML: LIQUID ORAL at 08:34

## 2020-11-04 RX ADMIN — CARBAMAZEPINE 150 MG: 100 SUSPENSION ORAL at 17:47

## 2020-11-04 ASSESSMENT — MIFFLIN-ST. JEOR: SCORE: 1568.13

## 2020-11-04 ASSESSMENT — ACTIVITIES OF DAILY LIVING (ADL)
ADLS_ACUITY_SCORE: 41
ADLS_ACUITY_SCORE: 39

## 2020-11-04 NOTE — PLAN OF CARE
Neuro: Alert, nonverbal, unable to fully assess orientation, intermittently follows commands, only purposeful movement noted in left upper extremity.   Cardiac: SR. VSS.   Respiratory: Sating mid 90's on RA. Placed on oxiplus mask on 2L while asleep due to desating and apnea while asleep.   GI/: Adequate urine output per aaron, no BM, frequent mouth cares.   Diet/appetite: Tolerating tube feeds at goal with no nausea, otherwise npo.  Activity:  Assist of 2, turned q 2 hours.   Pain: At acceptable level on current regimen.   Skin: No new deficits noted. Mepilex on coccyx   LDA's:PIV saline locked. Gastric tube to drainage, J tube to tube feeds. Chronic aaron.     Plan: Continue with POC. Notify primary team with changes.

## 2020-11-04 NOTE — PLAN OF CARE
"Blood pressure 120/88, pulse 97, temperature 98.2  F (36.8  C), temperature source Oral, resp. rate 19, height 1.829 m (6' 0.01\"), weight 71 kg (156 lb 8.4 oz), SpO2 94 %.  Neuro: Alert, nonverbal, unable to fully assess orientation, intermittently follows commands, only purposeful movement noted in left upper extremity.   Cardiac: SR. VSS.       Respiratory: Sating mid 90's on RA. Frequent congested cough- needing oral suction.  GI/: Large urine output via aaron this afternoon- DDAVP restarted with improvement in urine output.  no BM- will give PRN senna, frequent mouth cares.   Diet/appetite: Tolerating tube feeds at goal with no nausea, otherwise npo.  Activity:  Assist of 2, turned q 2 hours, up in chair most of the afternoon.   Pain: At acceptable level on current regimen.   Skin: No new deficits noted. Mepilex on coccyx, back, elbows and heels.  LDA's:PIV saline locked. Gastric tube to drainage, J tube to tube feeds, aaron.      Plan: Plan for discharge Friday to home with home care. Continue with POC. Notify primary team with changes.  "

## 2020-11-04 NOTE — PLAN OF CARE
[facility] care [id number]. Alert, unable to assess orientation. Intermittently following commands. Purposeful movement/localizing in LUE only. Pupils unequal, reactive. Appears comfortable. SR. VSS. SpO2 > 95% on room air. Loose, weak cough present. Coarse lung sounds. Adequate UOP via aaron. Tolerating TF at goal via J tube, G tube to gravity.
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
## 2020-11-04 NOTE — PROGRESS NOTES
[facility] [place]     Endocrinology consult- follow up    Date: November 4, 2020    ASSESSMENT/PLAN    Hypernatremia d/t central diabetes insipidus   H/o hypernatremia in 2017 and 2019's hospitalization. He was on DDAVP briefly in 2017 for DI but weaned off. Not on home DDAVP for many years. He had Na level of 122 at [facility] in 11/2019, which could explain why he was DDAVP was stopped.  He likely has possible partial central DI given low urine osmolality in a setting of hypernatremia and improvement of hypernatremia and urine osm with DDAVP.   Given worsening of Na level and urine output while off DDAVP, he would likely require DDAVP treatment from now on. Monitoring UOP and Na to determine an adequate DDAVP dose.     - increase DDAVP to 100 mcg PO bid   - continue checking Na q8h  - continue monitoring UOP q4h     Secondary adrenal insufficiency  Home regimen: Hydrocotisone (HC) 20mg in the AM, 10mg with dinner. Required stress-dose steroids initially this hospitalization, now back to home dose.   -Continue pta hydrocortisone 20mg in AM, 10 mg in PM     Central hypothyroidism   -Cont  mcg daily   -Will check FT4 when pt is stable    Central hypogonadism   -Testosterone can be restarted as outpt    Steroid induced hyperglycemia   Started on Lantus inpatient while on stress-dose steroids, now back to pta dose steroids, will taper off insulin.   - reduced Lantus to 10u qam, anticipate discontinuing prior to discharge     Patient staffed with Dr. Mclean.     Elen Jacob, DO  Internal Medicine, PGY-2      Interval history  Urine output:= 2.5L/d on 11/3 >> 2L in 12h on 11/4    Received 1 mcg of desmopressin on 11/3/2020 at 0229    Na gradually trended down to 142 this AM, however, UOP has increased     Received 100 mcg of desmopressin at 1340 on 11/4.       Physical Exam  /75 (BP Location: Left arm)   Pulse 89   Temp 98.1  F (36.7  C) (Axillary)   Resp 20   Ht 1.829 m (6'  "0.01\")   Wt 71 kg (156 lb 8.4 oz)   SpO2 97%   BMI 21.22 kg/m      Physical examination not performed due to COVID-19 restriction.     DATA REVIEW  Tmax 100, HDS, SpO2 95% on room air     11/4: Na 142 (5a)  11/3: Na 143 < 146 < 150; Uosm 204          "

## 2020-11-04 NOTE — PROGRESS NOTES
Care Management Follow Up    Length of Stay (days): 10    Expected Discharge Date: 11/6/20     Concerns to be Addressed: discharge planning     Patient plan of care discussed at interdisciplinary rounds: Yes    Anticipated Discharge Disposition:  Home     Anticipated Discharge Services:  Resumption of:   Nursing provided by [facility]   PT provided by  Home Care  PCA services    Patient/family educated on Medicare website which has current facility and service quality ratings:   yes  Education Provided on the Discharge Plan:  yes  Patient/Family in Agreement with the Plan:   yes  Referrals Placed by CM/SW: Resumption orders placed    Transportation:  [facility]  Stretcher, 11/6/20 @ 9am    Additional Information:  I have met with Pts Mother, Jessica to assist with discharge planning.   Jessica is agreeable to discharge Friday morning, arrangements have been made for resumption of Home Care services.      Donna Kelley RN   6B care coordinator #[phone]

## 2020-11-05 DIAGNOSIS — E23.2 PRIMARY CENTRAL DIABETES INSIPIDUS (H): Primary | ICD-10-CM

## 2020-11-05 LAB
ALBUMIN SERPL-MCNC: 2.4 G/DL (ref 3.4–5)
ALP SERPL-CCNC: 102 U/L (ref 40–150)
ALT SERPL W P-5'-P-CCNC: 35 U/L (ref 0–70)
ANION GAP SERPL CALCULATED.3IONS-SCNC: 6 MMOL/L (ref 3–14)
AST SERPL W P-5'-P-CCNC: 24 U/L (ref 0–45)
BASOPHILS # BLD AUTO: 0.1 10E9/L (ref 0–0.2)
BASOPHILS NFR BLD AUTO: 0.4 %
BILIRUB SERPL-MCNC: 0.3 MG/DL (ref 0.2–1.3)
BUN SERPL-MCNC: 30 MG/DL (ref 7–30)
CALCIUM SERPL-MCNC: 8.3 MG/DL (ref 8.5–10.1)
CHLORIDE SERPL-SCNC: 110 MMOL/L (ref 94–109)
CO2 SERPL-SCNC: 28 MMOL/L (ref 20–32)
CREAT SERPL-MCNC: 0.81 MG/DL (ref 0.66–1.25)
DIFFERENTIAL METHOD BLD: ABNORMAL
EOSINOPHIL # BLD AUTO: 0.4 10E9/L (ref 0–0.7)
EOSINOPHIL NFR BLD AUTO: 3.7 %
ERYTHROCYTE [DISTWIDTH] IN BLOOD BY AUTOMATED COUNT: 16.1 % (ref 10–15)
GFR SERPL CREATININE-BSD FRML MDRD: >90 ML/MIN/{1.73_M2}
GLUCOSE BLDC GLUCOMTR-MCNC: 103 MG/DL (ref 70–99)
GLUCOSE BLDC GLUCOMTR-MCNC: 107 MG/DL (ref 70–99)
GLUCOSE BLDC GLUCOMTR-MCNC: 115 MG/DL (ref 70–99)
GLUCOSE BLDC GLUCOMTR-MCNC: 117 MG/DL (ref 70–99)
GLUCOSE BLDC GLUCOMTR-MCNC: 151 MG/DL (ref 70–99)
GLUCOSE BLDC GLUCOMTR-MCNC: 93 MG/DL (ref 70–99)
GLUCOSE SERPL-MCNC: 101 MG/DL (ref 70–99)
HCT VFR BLD AUTO: 35.1 % (ref 40–53)
HGB BLD-MCNC: 11.2 G/DL (ref 13.3–17.7)
IMM GRANULOCYTES # BLD: 0.1 10E9/L (ref 0–0.4)
IMM GRANULOCYTES NFR BLD: 1 %
LYMPHOCYTES # BLD AUTO: 3.5 10E9/L (ref 0.8–5.3)
LYMPHOCYTES NFR BLD AUTO: 29.7 %
MAGNESIUM SERPL-MCNC: 2.5 MG/DL (ref 1.6–2.3)
MCH RBC QN AUTO: 30.3 PG (ref 26.5–33)
MCHC RBC AUTO-ENTMCNC: 31.9 G/DL (ref 31.5–36.5)
MCV RBC AUTO: 95 FL (ref 78–100)
MONOCYTES # BLD AUTO: 0.9 10E9/L (ref 0–1.3)
MONOCYTES NFR BLD AUTO: 8 %
NEUTROPHILS # BLD AUTO: 6.7 10E9/L (ref 1.6–8.3)
NEUTROPHILS NFR BLD AUTO: 57.2 %
NRBC # BLD AUTO: 0 10*3/UL
NRBC BLD AUTO-RTO: 0 /100
PHOSPHATE SERPL-MCNC: 2.1 MG/DL (ref 2.5–4.5)
PLATELET # BLD AUTO: 231 10E9/L (ref 150–450)
POTASSIUM SERPL-SCNC: 3.7 MMOL/L (ref 3.4–5.3)
PROT SERPL-MCNC: 6.4 G/DL (ref 6.8–8.8)
RBC # BLD AUTO: 3.7 10E12/L (ref 4.4–5.9)
SODIUM SERPL-SCNC: 137 MMOL/L (ref 133–144)
SODIUM SERPL-SCNC: 139 MMOL/L (ref 133–144)
SODIUM SERPL-SCNC: 143 MMOL/L (ref 133–144)
WBC # BLD AUTO: 11.7 10E9/L (ref 4–11)

## 2020-11-05 PROCEDURE — 272N000078 HC NUTRITION PRODUCT INTERMEDIATE LITER

## 2020-11-05 PROCEDURE — 84295 ASSAY OF SERUM SODIUM: CPT | Performed by: PHYSICIAN ASSISTANT

## 2020-11-05 PROCEDURE — 250N000009 HC RX 250: Performed by: STUDENT IN AN ORGANIZED HEALTH CARE EDUCATION/TRAINING PROGRAM

## 2020-11-05 PROCEDURE — 84100 ASSAY OF PHOSPHORUS: CPT | Performed by: INTERNAL MEDICINE

## 2020-11-05 PROCEDURE — 99232 SBSQ HOSP IP/OBS MODERATE 35: CPT | Performed by: STUDENT IN AN ORGANIZED HEALTH CARE EDUCATION/TRAINING PROGRAM

## 2020-11-05 PROCEDURE — 999N001017 HC STATISTIC GLUCOSE BY METER IP

## 2020-11-05 PROCEDURE — 84295 ASSAY OF SERUM SODIUM: CPT | Performed by: INTERNAL MEDICINE

## 2020-11-05 PROCEDURE — 99207 PR CDG-MDM COMPONENT: MEETS LOW - DOWN CODED: CPT | Performed by: STUDENT IN AN ORGANIZED HEALTH CARE EDUCATION/TRAINING PROGRAM

## 2020-11-05 PROCEDURE — 250N000013 HC RX MED GY IP 250 OP 250 PS 637: Performed by: STUDENT IN AN ORGANIZED HEALTH CARE EDUCATION/TRAINING PROGRAM

## 2020-11-05 PROCEDURE — 36415 COLL VENOUS BLD VENIPUNCTURE: CPT | Performed by: PHYSICIAN ASSISTANT

## 2020-11-05 PROCEDURE — 258N000003 HC RX IP 258 OP 636: Performed by: STUDENT IN AN ORGANIZED HEALTH CARE EDUCATION/TRAINING PROGRAM

## 2020-11-05 PROCEDURE — 250N000011 HC RX IP 250 OP 636: Performed by: STUDENT IN AN ORGANIZED HEALTH CARE EDUCATION/TRAINING PROGRAM

## 2020-11-05 PROCEDURE — 250N000013 HC RX MED GY IP 250 OP 250 PS 637: Performed by: INTERNAL MEDICINE

## 2020-11-05 PROCEDURE — C9113 INJ PANTOPRAZOLE SODIUM, VIA: HCPCS | Performed by: STUDENT IN AN ORGANIZED HEALTH CARE EDUCATION/TRAINING PROGRAM

## 2020-11-05 PROCEDURE — 85025 COMPLETE CBC W/AUTO DIFF WBC: CPT | Performed by: INTERNAL MEDICINE

## 2020-11-05 PROCEDURE — 36415 COLL VENOUS BLD VENIPUNCTURE: CPT | Performed by: INTERNAL MEDICINE

## 2020-11-05 PROCEDURE — 120N000003 HC R&B IMCU UMMC

## 2020-11-05 PROCEDURE — 99232 SBSQ HOSP IP/OBS MODERATE 35: CPT | Performed by: INTERNAL MEDICINE

## 2020-11-05 PROCEDURE — 999N000157 HC STATISTIC RCP TIME EA 10 MIN

## 2020-11-05 PROCEDURE — 94640 AIRWAY INHALATION TREATMENT: CPT

## 2020-11-05 PROCEDURE — 83735 ASSAY OF MAGNESIUM: CPT | Performed by: INTERNAL MEDICINE

## 2020-11-05 PROCEDURE — 80053 COMPREHEN METABOLIC PANEL: CPT | Performed by: INTERNAL MEDICINE

## 2020-11-05 PROCEDURE — 94640 AIRWAY INHALATION TREATMENT: CPT | Mod: 76

## 2020-11-05 RX ORDER — POTASSIUM CHLORIDE 1.5 G/1.58G
20 POWDER, FOR SOLUTION ORAL ONCE
Status: COMPLETED | OUTPATIENT
Start: 2020-11-05 | End: 2020-11-05

## 2020-11-05 RX ADMIN — ACETYLCYSTEINE 2 ML: 200 SOLUTION ORAL; RESPIRATORY (INHALATION) at 13:35

## 2020-11-05 RX ADMIN — ACETYLCYSTEINE 2 ML: 200 SOLUTION ORAL; RESPIRATORY (INHALATION) at 09:09

## 2020-11-05 RX ADMIN — SODIUM PHOSPHATE, MONOBASIC, MONOHYDRATE AND SODIUM PHOSPHATE, DIBASIC, ANHYDROUS 9 MMOL: 276; 142 INJECTION, SOLUTION INTRAVENOUS at 08:10

## 2020-11-05 RX ADMIN — MULTIVITAMIN 15 ML: LIQUID ORAL at 07:59

## 2020-11-05 RX ADMIN — Medication 1 PACKET: at 08:02

## 2020-11-05 RX ADMIN — ALBUTEROL SULFATE 2.5 MG: 2.5 SOLUTION RESPIRATORY (INHALATION) at 09:10

## 2020-11-05 RX ADMIN — CALCIUM CARBONATE 1250 MG: 1250 SUSPENSION ORAL at 17:35

## 2020-11-05 RX ADMIN — HYDROCORTISONE 20 MG: 10 TABLET ORAL at 07:59

## 2020-11-05 RX ADMIN — DESMOPRESSIN ACETATE 100 MCG: 0.1 TABLET ORAL at 13:02

## 2020-11-05 RX ADMIN — HYDROCORTISONE 10 MG: 10 TABLET ORAL at 17:35

## 2020-11-05 RX ADMIN — DESMOPRESSIN ACETATE 100 MCG: 0.1 TABLET ORAL at 02:13

## 2020-11-05 RX ADMIN — POTASSIUM CHLORIDE 20 MEQ: 1.5 POWDER, FOR SOLUTION ORAL at 06:48

## 2020-11-05 RX ADMIN — ALBUTEROL SULFATE 2.5 MG: 2.5 SOLUTION RESPIRATORY (INHALATION) at 13:35

## 2020-11-05 RX ADMIN — CALCIUM CARBONATE 1250 MG: 1250 SUSPENSION ORAL at 08:00

## 2020-11-05 RX ADMIN — METOCLOPRAMIDE HYDROCHLORIDE 10 MG: 5 SOLUTION ORAL at 17:35

## 2020-11-05 RX ADMIN — METOCLOPRAMIDE HYDROCHLORIDE 10 MG: 5 SOLUTION ORAL at 08:00

## 2020-11-05 RX ADMIN — ENOXAPARIN SODIUM 40 MG: 40 INJECTION SUBCUTANEOUS at 06:48

## 2020-11-05 RX ADMIN — BRIVARACETAM 100 MG: 10 SOLUTION ORAL at 20:34

## 2020-11-05 RX ADMIN — METOCLOPRAMIDE HYDROCHLORIDE 10 MG: 5 SOLUTION ORAL at 11:58

## 2020-11-05 RX ADMIN — Medication 50 MCG: at 07:59

## 2020-11-05 RX ADMIN — PANTOPRAZOLE SODIUM 40 MG: 40 INJECTION, POWDER, FOR SOLUTION INTRAVENOUS at 08:00

## 2020-11-05 RX ADMIN — MICONAZOLE NITRATE: 20 POWDER TOPICAL at 08:01

## 2020-11-05 RX ADMIN — SODIUM BICARBONATE 650 MG TABLET 650 MG: at 07:59

## 2020-11-05 RX ADMIN — LEVOTHYROXINE SODIUM 150 MCG: 0.15 TABLET ORAL at 07:59

## 2020-11-05 RX ADMIN — CARBAMAZEPINE 150 MG: 100 SUSPENSION ORAL at 17:35

## 2020-11-05 RX ADMIN — ACETYLCYSTEINE 2 ML: 200 SOLUTION ORAL; RESPIRATORY (INHALATION) at 20:11

## 2020-11-05 RX ADMIN — CARBAMAZEPINE 150 MG: 100 SUSPENSION ORAL at 11:58

## 2020-11-05 RX ADMIN — ALBUTEROL SULFATE 2.5 MG: 2.5 SOLUTION RESPIRATORY (INHALATION) at 20:11

## 2020-11-05 RX ADMIN — ACETYLCYSTEINE 2 ML: 200 SOLUTION ORAL; RESPIRATORY (INHALATION) at 16:59

## 2020-11-05 RX ADMIN — ALBUTEROL SULFATE 2.5 MG: 2.5 SOLUTION RESPIRATORY (INHALATION) at 16:59

## 2020-11-05 RX ADMIN — INSULIN ASPART 1 UNITS: 100 INJECTION, SOLUTION INTRAVENOUS; SUBCUTANEOUS at 11:59

## 2020-11-05 RX ADMIN — CARBAMAZEPINE 150 MG: 100 SUSPENSION ORAL at 06:48

## 2020-11-05 RX ADMIN — MICONAZOLE NITRATE: 20 POWDER TOPICAL at 20:34

## 2020-11-05 RX ADMIN — BRIVARACETAM 100 MG: 10 SOLUTION ORAL at 08:16

## 2020-11-05 RX ADMIN — CALCIUM CARBONATE 1250 MG: 1250 SUSPENSION ORAL at 11:58

## 2020-11-05 ASSESSMENT — ACTIVITIES OF DAILY LIVING (ADL)
ADLS_ACUITY_SCORE: 41
ADLS_ACUITY_SCORE: 39
ADLS_ACUITY_SCORE: 41

## 2020-11-05 NOTE — PROGRESS NOTES
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 Endocrinology team following for DI and panhypopituitarism.      Central diabetes insipidus   -stable Na over the last 24 hours on DDAVP 100 mcg BID. Anticipate discharge on the same dose.   - change Na checks q 24 hours.   - pt needs to go home on instruction re: water intake. Drink to thirst instructions not applicable in this pt due to medical condition therefore we have to ensure that the amount of water intake is stable while he is on DDAVP.   - discussed in detail with pt's mother niharika.   - plan for discharge on 11/6/20 and after discharge needs Na check on 11/10/20 - discussed with Mom. She would like to switch to Endo within [facility] and will make an arrangement for hos discharge follow up.     Secondary adrenal insufficiency  Home regimen: Hydrocotisone (HC) 20mg in the AM, 10mg with dinner.    - sick day rules - increase hydrocortisone to 2-3X of the maintenance dose during sickness like flu.  -needs injection if unable to keep medication down.      Central hypothyroidism   -Cont  mcg daily      Central hypogonadism   -Testosterone at discharge.      Hyperglycemia   -BG stable no need for insulin at discharge.      The above plan was discussed with patient's mom in detail.         Interval History:   No significant change over the last 24 hours.              Medications:       acetylcysteine  2 mL Nebulization 4x Daily     albuterol  2.5 mg Nebulization 4x daily     Brivaracetam  100 mg Oral BID     calcium carbonate  1,250 mg Oral TID w/meals     carBAMazepine  150 mg Oral Q6H ALONDRA     desmopressin  100 mcg Per NG tube Q12H     enoxaparin ANTICOAGULANT  40 mg Subcutaneous Q24H     fiber modular (NUTRISOURCE FIBER)  1 packet Per Feeding Tube Daily     hydrocortisone  20 mg Oral QAM    And     hydrocortisone  10 mg Oral  "Daily with supper     insulin aspart  1-6 Units Subcutaneous Q4H     insulin glargine  10 Units Subcutaneous Daily     levothyroxine  150 mcg Oral QAM AC     metoclopramide  10 mg Oral 4x Daily AC & HS     miconazole   Topical BID     multivitamins w/minerals  15 mL Per Feeding Tube Daily     pantoprazole (PROTONIX) IV  40 mg Intravenous Daily     sodium bicarbonate  650 mg Oral Daily     sodium chloride (PF)  3 mL Intracatheter Q8H     sodium phosphate  9 mmol Intravenous Once     cholecalciferol  50 mcg Oral Daily        Physical Examinations:  /72 (BP Location: Right arm)   Pulse 92   Temp 98.6  F (37  C) (Axillary)   Resp 18   Ht 1.829 m (6' 0.01\")   Wt 71 kg (156 lb 8.4 oz)   SpO2 95%   BMI 21.22 kg/m    Body mass index is 21.22 kg/m .            Data:     Sodium   Date Value Ref Range Status   11/05/2020 143 133 - 144 mmol/L Final       Faizan Mclean MD  Endocrinology Staff /[phone]          "

## 2020-11-05 NOTE — PROGRESS NOTES
CLINICAL NUTRITION SERVICES - BRIEF NOTE      Nutrition Prescription     RECOMMENDATIONS FOR MDs/PROVIDERS TO ORDER:  Please provide additional education if volume goals change   Write discharge order with water flush of 120 mL (5-8 x per day, 600-960 mL free water)     Recommendations already ordered by Registered Dietitian (RD):  None at this time      Future/Additional Recommendations:  If patient's mother has additional questions post discharge, may follow up with outpatient RD.     *Please see full assessment note from 11/2/20    New Findings:  Paged team regarding mother's question on total free water per day.     Patient received ~1000 mL free water via TF formula and total of 1980 calories.   Current water flush 100 mL 6x per day (600 ml daily)     Discussed general recommendation of 1 mL/kcal.     Interventions  Collaboration with other providers   Nutrition education:   Discussed with patient's mother providing water flush of   600-1000 mL/day   Using 60 mL syringe give (120 mL) 5-8x per day.     Flushes can be given with medications and intermittently throughout the day.     RD to follow per protocol.    Evelyne Anaya RD, LD  6B pager: [phone]

## 2020-11-05 NOTE — PLAN OF CARE
"/80 (BP Location: Right arm)   Pulse 91   Temp 97.4  F (36.3  C) (Axillary)   Resp 18   Ht 1.829 m (6' 0.01\")   Wt 71 kg (156 lb 8.4 oz)   SpO2 96%   BMI 21.22 kg/m      Neuro: Alert. Unable to assess orientation.   Cardiac: SR. VSS.   Respiratory: Sating >92% on RA.  GI/: Adequate urine output via aaron. No BM  Diet/appetite: Tolerating TF. Tolerating well.   Activity:  Assist of 2, up to chair x1 today.   Pain: At acceptable level on current regimen.   Skin: No new deficits noted. Mepilex's in place.   LDA's: PIVx1    Plan: Plan to discharge home in AM. Continue with POC. Notify primary team with changes.    "

## 2020-11-05 NOTE — DISCHARGE INSTRUCTIONS
Water Flush:   Use 60 mL syringe to flush 120 ml water (2 syringes) 8x per day via feeding tube.  Total free water in flush = 960 mL/day    Recommend spacing out water flushes by 2-3 hours. May give water with medications.
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
## 2020-11-05 NOTE — PLAN OF CARE
Neuro: Alert, no sleep, nonverbal, unable to fully assess orientation, intermittently follows commands, only purposeful movement noted in left upper extremity.   Cardiac: SR. VSS.   Respiratory: Sating adequately on RA. Deep oral suctioning needed every 4 hours.   GI/: Adequate urine output. No BM. Miralax given  Diet/appetite: Tolerating tubefeeding.  Activity:  Turn Q 2 hours  Pain: At acceptable level on current regimen.   Skin: No new deficits noted.  LDA's: Lerma  GJ tube, G to gravity, J with TF, meds  Plan: Continue with POC. Notify primary team with changes.

## 2020-11-06 VITALS
RESPIRATION RATE: 16 BRPM | OXYGEN SATURATION: 98 % | WEIGHT: 156.53 LBS | BODY MASS INDEX: 21.2 KG/M2 | HEART RATE: 78 BPM | HEIGHT: 72 IN | DIASTOLIC BLOOD PRESSURE: 73 MMHG | TEMPERATURE: 96.6 F | SYSTOLIC BLOOD PRESSURE: 120 MMHG

## 2020-11-06 LAB
ALBUMIN SERPL-MCNC: 2.4 G/DL (ref 3.4–5)
ALP SERPL-CCNC: 96 U/L (ref 40–150)
ALT SERPL W P-5'-P-CCNC: 29 U/L (ref 0–70)
ANION GAP SERPL CALCULATED.3IONS-SCNC: 5 MMOL/L (ref 3–14)
AST SERPL W P-5'-P-CCNC: 18 U/L (ref 0–45)
BASOPHILS # BLD AUTO: 0.1 10E9/L (ref 0–0.2)
BASOPHILS NFR BLD AUTO: 0.7 %
BILIRUB SERPL-MCNC: 0.3 MG/DL (ref 0.2–1.3)
BUN SERPL-MCNC: 29 MG/DL (ref 7–30)
CALCIUM SERPL-MCNC: 7.9 MG/DL (ref 8.5–10.1)
CHLORIDE SERPL-SCNC: 104 MMOL/L (ref 94–109)
CO2 SERPL-SCNC: 27 MMOL/L (ref 20–32)
CREAT SERPL-MCNC: 0.74 MG/DL (ref 0.66–1.25)
DIFFERENTIAL METHOD BLD: ABNORMAL
EOSINOPHIL # BLD AUTO: 0.4 10E9/L (ref 0–0.7)
EOSINOPHIL NFR BLD AUTO: 3.7 %
ERYTHROCYTE [DISTWIDTH] IN BLOOD BY AUTOMATED COUNT: 15.9 % (ref 10–15)
GFR SERPL CREATININE-BSD FRML MDRD: >90 ML/MIN/{1.73_M2}
GLUCOSE BLDC GLUCOMTR-MCNC: 112 MG/DL (ref 70–99)
GLUCOSE SERPL-MCNC: 86 MG/DL (ref 70–99)
HCT VFR BLD AUTO: 32.9 % (ref 40–53)
HGB BLD-MCNC: 10.1 G/DL (ref 13.3–17.7)
IMM GRANULOCYTES # BLD: 0.1 10E9/L (ref 0–0.4)
IMM GRANULOCYTES NFR BLD: 1.2 %
INTERPRETATION ECG - MUSE: NORMAL
LYMPHOCYTES # BLD AUTO: 3.5 10E9/L (ref 0.8–5.3)
LYMPHOCYTES NFR BLD AUTO: 32.9 %
MAGNESIUM SERPL-MCNC: 2.4 MG/DL (ref 1.6–2.3)
MCH RBC QN AUTO: 29 PG (ref 26.5–33)
MCHC RBC AUTO-ENTMCNC: 30.7 G/DL (ref 31.5–36.5)
MCV RBC AUTO: 95 FL (ref 78–100)
MONOCYTES # BLD AUTO: 1 10E9/L (ref 0–1.3)
MONOCYTES NFR BLD AUTO: 9.9 %
NEUTROPHILS # BLD AUTO: 5.4 10E9/L (ref 1.6–8.3)
NEUTROPHILS NFR BLD AUTO: 51.6 %
NRBC # BLD AUTO: 0 10*3/UL
NRBC BLD AUTO-RTO: 0 /100
PHOSPHATE SERPL-MCNC: 2 MG/DL (ref 2.5–4.5)
PLATELET # BLD AUTO: 220 10E9/L (ref 150–450)
POTASSIUM SERPL-SCNC: 3.8 MMOL/L (ref 3.4–5.3)
PROT SERPL-MCNC: 6.1 G/DL (ref 6.8–8.8)
RBC # BLD AUTO: 3.48 10E12/L (ref 4.4–5.9)
SODIUM SERPL-SCNC: 136 MMOL/L (ref 133–144)
WBC # BLD AUTO: 10.5 10E9/L (ref 4–11)

## 2020-11-06 PROCEDURE — C9113 INJ PANTOPRAZOLE SODIUM, VIA: HCPCS | Performed by: STUDENT IN AN ORGANIZED HEALTH CARE EDUCATION/TRAINING PROGRAM

## 2020-11-06 PROCEDURE — 250N000013 HC RX MED GY IP 250 OP 250 PS 637: Performed by: INTERNAL MEDICINE

## 2020-11-06 PROCEDURE — 80053 COMPREHEN METABOLIC PANEL: CPT | Performed by: INTERNAL MEDICINE

## 2020-11-06 PROCEDURE — 84100 ASSAY OF PHOSPHORUS: CPT | Performed by: INTERNAL MEDICINE

## 2020-11-06 PROCEDURE — 250N000011 HC RX IP 250 OP 636: Performed by: STUDENT IN AN ORGANIZED HEALTH CARE EDUCATION/TRAINING PROGRAM

## 2020-11-06 PROCEDURE — 94640 AIRWAY INHALATION TREATMENT: CPT

## 2020-11-06 PROCEDURE — 999N000157 HC STATISTIC RCP TIME EA 10 MIN

## 2020-11-06 PROCEDURE — 85025 COMPLETE CBC W/AUTO DIFF WBC: CPT | Performed by: INTERNAL MEDICINE

## 2020-11-06 PROCEDURE — 272N000078 HC NUTRITION PRODUCT INTERMEDIATE LITER

## 2020-11-06 PROCEDURE — 84295 ASSAY OF SERUM SODIUM: CPT | Performed by: INTERNAL MEDICINE

## 2020-11-06 PROCEDURE — 999N000156 HC STATISTIC RCP CONSULT EA 30 MIN

## 2020-11-06 PROCEDURE — 36415 COLL VENOUS BLD VENIPUNCTURE: CPT | Performed by: INTERNAL MEDICINE

## 2020-11-06 PROCEDURE — 999N001017 HC STATISTIC GLUCOSE BY METER IP

## 2020-11-06 PROCEDURE — 99231 SBSQ HOSP IP/OBS SF/LOW 25: CPT | Performed by: INTERNAL MEDICINE

## 2020-11-06 PROCEDURE — 250N000009 HC RX 250: Performed by: STUDENT IN AN ORGANIZED HEALTH CARE EDUCATION/TRAINING PROGRAM

## 2020-11-06 PROCEDURE — 250N000013 HC RX MED GY IP 250 OP 250 PS 637: Performed by: STUDENT IN AN ORGANIZED HEALTH CARE EDUCATION/TRAINING PROGRAM

## 2020-11-06 PROCEDURE — 83735 ASSAY OF MAGNESIUM: CPT | Performed by: INTERNAL MEDICINE

## 2020-11-06 PROCEDURE — 99239 HOSP IP/OBS DSCHRG MGMT >30: CPT | Performed by: STUDENT IN AN ORGANIZED HEALTH CARE EDUCATION/TRAINING PROGRAM

## 2020-11-06 RX ORDER — POTASSIUM CHLORIDE 1.5 G/1.58G
20 POWDER, FOR SOLUTION ORAL ONCE
Status: COMPLETED | OUTPATIENT
Start: 2020-11-06 | End: 2020-11-06

## 2020-11-06 RX ORDER — DESMOPRESSIN ACETATE 0.1 MG/1
0.1 TABLET ORAL 2 TIMES DAILY
Qty: 60 TABLET | Refills: 3 | Status: SHIPPED | OUTPATIENT
Start: 2020-11-06 | End: 2020-11-06

## 2020-11-06 RX ORDER — DESMOPRESSIN ACETATE 0.1 MG/1
TABLET ORAL
Qty: 60 TABLET | Refills: 3 | Status: SHIPPED | OUTPATIENT
Start: 2020-11-06 | End: 2020-11-11

## 2020-11-06 RX ADMIN — BRIVARACETAM 100 MG: 10 SOLUTION ORAL at 08:18

## 2020-11-06 RX ADMIN — Medication 50 MCG: at 08:03

## 2020-11-06 RX ADMIN — ENOXAPARIN SODIUM 40 MG: 40 INJECTION SUBCUTANEOUS at 08:04

## 2020-11-06 RX ADMIN — ACETYLCYSTEINE 2 ML: 200 SOLUTION ORAL; RESPIRATORY (INHALATION) at 07:29

## 2020-11-06 RX ADMIN — POTASSIUM CHLORIDE 20 MEQ: 1.5 POWDER, FOR SOLUTION ORAL at 08:17

## 2020-11-06 RX ADMIN — MICONAZOLE NITRATE: 20 POWDER TOPICAL at 08:04

## 2020-11-06 RX ADMIN — CARBAMAZEPINE 150 MG: 100 SUSPENSION ORAL at 00:41

## 2020-11-06 RX ADMIN — CARBAMAZEPINE 150 MG: 100 SUSPENSION ORAL at 08:02

## 2020-11-06 RX ADMIN — CALCIUM CARBONATE 1250 MG: 1250 SUSPENSION ORAL at 08:02

## 2020-11-06 RX ADMIN — METOCLOPRAMIDE HYDROCHLORIDE 10 MG: 5 SOLUTION ORAL at 08:03

## 2020-11-06 RX ADMIN — Medication 1 PACKET: at 08:18

## 2020-11-06 RX ADMIN — SODIUM BICARBONATE 650 MG TABLET 650 MG: at 08:17

## 2020-11-06 RX ADMIN — PANTOPRAZOLE SODIUM 40 MG: 40 INJECTION, POWDER, FOR SOLUTION INTRAVENOUS at 08:04

## 2020-11-06 RX ADMIN — ALBUTEROL SULFATE 2.5 MG: 2.5 SOLUTION RESPIRATORY (INHALATION) at 07:30

## 2020-11-06 RX ADMIN — MULTIVITAMIN 15 ML: LIQUID ORAL at 08:02

## 2020-11-06 RX ADMIN — LEVOTHYROXINE SODIUM 150 MCG: 0.15 TABLET ORAL at 08:02

## 2020-11-06 RX ADMIN — POTASSIUM & SODIUM PHOSPHATES POWDER PACK 280-160-250 MG 1 PACKET: 280-160-250 PACK at 08:18

## 2020-11-06 RX ADMIN — HYDROCORTISONE 20 MG: 10 TABLET ORAL at 08:02

## 2020-11-06 RX ADMIN — DESMOPRESSIN ACETATE 100 MCG: 0.1 TABLET ORAL at 00:41

## 2020-11-06 RX ADMIN — METOCLOPRAMIDE HYDROCHLORIDE 10 MG: 5 SOLUTION ORAL at 00:41

## 2020-11-06 ASSESSMENT — ACTIVITIES OF DAILY LIVING (ADL)
ADLS_ACUITY_SCORE: 41

## 2020-11-06 NOTE — PROGRESS NOTES
DISCHARGE                         No discharge date for patient encounter.  ----------------------------------------------------------------------------  Discharged to: Home  Via: [facility]   Accompanied by: Self   Discharge Instructions: *diet, *activity, medications, follow up appointments, when to call the MD, aftercare instructions.  Prescriptions: To be filled by  pharmacy; medication list reviewed & sent with pt  Follow Up Appointments: arranged; information given  Belongings: All sent with pt  IV: d/c'd  Telemetry: d/c'd  Pt exhibits understanding of above discharge instructions; all questions answered.    Discharge Paperwork: Signed, copied, and sent home with patient.
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
## 2020-11-06 NOTE — PLAN OF CARE
Occupational Therapy Discharge Summary    Reason for therapy discharge:    Discharged to home.    Progress towards therapy goal(s). See goals on Care Plan in [facility] electronic health record for goal details.  Goals partially met.  Barriers to achieving goals:   discharge from facility.    Therapy recommendation(s):    Continue home exercise program. ROM to maintain joint integrity. Pt dependent for all transfers and cares.

## 2020-11-06 NOTE — PLAN OF CARE
Neuro: Alert. HECTOR orientation. Right-sided hemiplegia with slight facial droop at baseline.   Cardiac: SR. VSS. Afebrile.    Respiratory: Sating 90's on RA.   GI/: Adequate urine output through aaron. BM X1, incontinent.   Diet/appetite: Tolerating TF.  Activity:  Assist of 2 with mechanical lift. Repositioned Q2hrs.   Pain: At acceptable level on current regimen.   Skin: No new deficits noted. Mepilexes applied.   LDA's: PIV x1 SL.     Plan: Phosphorus and potassium to be replaced this AM with 0800 medications. Plan for discharge home around 9 AM this morning with [facility] transportation. Day RN to confirm with primary team if pt to go home with aaron. Pt's guardian (mom) would like prescriptions filled at hospital's pharmacy and sent to pt's room before discharge.

## 2020-11-06 NOTE — PROGRESS NOTES
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 Endocrinology team following for DI and panhypopituitarism.      Central diabetes insipidus   - DDAVP 50 mcg in the morning and 100 mcg at bedtime @ discharge.   - check Na on 11/10/20. Order placed. Please schedule lab appointment at the [facility] lab for patient.   - Mom would like to switch care to  therefore appointment made at [facility] endocrinology clinic.   - Mom provided with endocrine on-call numbers. Discussed to watch for polyuria or significant change in urine.      Secondary adrenal insufficiency  Hydrocortisone (HC) 20mg in the AM, 10mg with dinner.    - sick day rules - increase hydrocortisone to 2-3X of the maintenance dose during sickness like flu. Needs injection if unable to keep medication down due to vomiting.       Central hypothyroidism   -Levothyroxine 150 mcg daily      Central hypogonadism   -Testosterone PTA.      Hyperglycemia - resolved   -No need for insulin at discharge       The above plan was discussed with patient's mom in detail on 11/6/20.           Medications:       acetylcysteine  2 mL Nebulization 4x Daily     albuterol  2.5 mg Nebulization 4x daily     Brivaracetam  100 mg Oral BID     calcium carbonate  1,250 mg Oral TID w/meals     carBAMazepine  150 mg Oral Q6H ALONDRA     desmopressin  100 mcg Per NG tube Q12H     enoxaparin ANTICOAGULANT  40 mg Subcutaneous Q24H     fiber modular (NUTRISOURCE FIBER)  1 packet Per Feeding Tube Daily     hydrocortisone  20 mg Oral QAM    And     hydrocortisone  10 mg Oral Daily with supper     insulin aspart  1-6 Units Subcutaneous Q4H     levothyroxine  150 mcg Oral QAM AC     metoclopramide  10 mg Oral 4x Daily AC & HS     miconazole   Topical BID     multivitamins w/minerals  15 mL Per Feeding Tube Daily     pantoprazole (PROTONIX) IV  40 mg Intravenous Daily     potassium &  "sodium phosphates  1 packet Oral TID     sodium bicarbonate  650 mg Oral Daily     sodium chloride (PF)  3 mL Intracatheter Q8H     cholecalciferol  50 mcg Oral Daily        Physical Examinations:  /73 (BP Location: Right arm)   Pulse 78   Temp 96.6  F (35.9  C) (Axillary)   Resp 16   Ht 1.829 m (6' 0.01\")   Wt 71 kg (156 lb 8.4 oz)   SpO2 98%   BMI 21.22 kg/m    Body mass index is 21.22 kg/m .          Data:     Last Comprehensive Metabolic Panel:  Sodium   Date Value Ref Range Status   11/06/2020 136 133 - 144 mmol/L Final     Potassium   Date Value Ref Range Status   11/06/2020 3.8 3.4 - 5.3 mmol/L Final     Chloride   Date Value Ref Range Status   11/06/2020 104 94 - 109 mmol/L Final     Carbon Dioxide   Date Value Ref Range Status   11/06/2020 27 20 - 32 mmol/L Final     Anion Gap   Date Value Ref Range Status   11/06/2020 5 3 - 14 mmol/L Final     Glucose   Date Value Ref Range Status   11/06/2020 86 70 - 99 mg/dL Final     Urea Nitrogen   Date Value Ref Range Status   11/06/2020 29 7 - 30 mg/dL Final     Creatinine   Date Value Ref Range Status   11/06/2020 0.74 0.66 - 1.25 mg/dL Final     GFR Estimate   Date Value Ref Range Status   11/06/2020 >90 >60 mL/min/[1.73_m2] Final     Comment:     Non  GFR Calc  Starting 12/18/2018, serum creatinine based estimated GFR (eGFR) will be   calculated using the Chronic Kidney Disease Epidemiology Collaboration   (CKD-EPI) equation.       Calcium   Date Value Ref Range Status   11/06/2020 7.9 (L) 8.5 - 10.1 mg/dL Final         Faizan Mclean MD  Endocrinology Staff /[phone]          "

## 2020-11-07 ENCOUNTER — PATIENT OUTREACH (OUTPATIENT)
Dept: CARE COORDINATION | Facility: CLINIC | Age: 60
End: 2020-11-07

## 2020-11-09 ENCOUNTER — DOCUMENTATION ONLY (OUTPATIENT)
Dept: CARE COORDINATION | Facility: CLINIC | Age: 60
End: 2020-11-09

## 2020-11-09 NOTE — PROGRESS NOTES
[facility]: Post-Discharge Note  SITUATION                                                      Admission:    Admission Date: 10/25/20   Reason for Admission: Septic shock 2/2 aspiration pneumonia  Discharge:   Discharge Date: 11/06/20  Discharge Diagnosis: Septic shock 2/2 aspiration pneumonia    BACKGROUND                                                      60 year old male with history of remote TBI c/b spastic hemiplegia, recurrent aspiration pneumonia, seizure disorder, history of DVT, panhypopituitarism, history of V-fib, GERD admitted on 10/25/2020 with septic shock secondary to aspiration PNA now s/p treatment. Goals of care discussion with patient and family during stay and care giver wishes to pursue all treatment options. Advised to go to TCU for recovery but patients care giver wanted him to return home. Patients sodium trended down a little on 100 mcg BID so patient discharged on 50 mcg in AM and 100 mcg in PM per endo. He was stable on discharge and has a follow up with endo 11/11 with a Na draw 11/10.    ASSESSMENT      Discharge Assessment  Patient reports symptoms are: Improved  Does the patient have all of their medications?: Yes  Does patient know what their new medications are for?: Yes  Does patient have a follow-up appointment scheduled?: Yes  Does patient have any other questions or concerns?: No    Post-op  Did the patient have surgery or a procedure: No  Fever: No  Chills: No  Eating & Drinking: eating and drinking without complaints/concerns  PO Intake: regular diet  Bowel Function: normal  Urinary Status: voiding without complaint/concerns        PLAN                                                      Outpatient Plan:  Patient to get a sodium drawn on 11/10 and follow up with endocrinology on 11/11 at 1 PM     Future Appointments   Date Time Provider Department Center   11/10/2020 11:20 AM  LAB ONLY [facility]   11/11/2020  1:00 PM [facility]           Suki Johnson, CMA

## 2020-11-10 ENCOUNTER — HOSPITAL ENCOUNTER (OUTPATIENT)
Dept: LAB | Facility: CLINIC | Age: 60
Discharge: HOME | End: 2020-11-10
Attending: PHYSICIAN ASSISTANT | Admitting: INTERNAL MEDICINE
Payer: MEDICARE

## 2020-11-10 DIAGNOSIS — E23.2 PRIMARY CENTRAL DIABETES INSIPIDUS (H): ICD-10-CM

## 2020-11-10 LAB
ANION GAP SERPL CALCULATED.3IONS-SCNC: 3 MMOL/L (ref 3–14)
BUN SERPL-MCNC: 26 MG/DL (ref 7–30)
CALCIUM SERPL-MCNC: 8.3 MG/DL (ref 8.5–10.1)
CHLORIDE SERPL-SCNC: 96 MMOL/L (ref 94–109)
CO2 SERPL-SCNC: 31 MMOL/L (ref 20–32)
CREAT SERPL-MCNC: 0.83 MG/DL (ref 0.66–1.25)
GFR SERPL CREATININE-BSD FRML MDRD: >90 ML/MIN/{1.73_M2}
GLUCOSE SERPL-MCNC: 105 MG/DL (ref 70–99)
POTASSIUM SERPL-SCNC: 4.1 MMOL/L (ref 3.4–5.3)
SODIUM SERPL-SCNC: 130 MMOL/L (ref 133–144)

## 2020-11-10 PROCEDURE — 80048 BASIC METABOLIC PNL TOTAL CA: CPT | Performed by: INTERNAL MEDICINE

## 2020-11-10 PROCEDURE — 36415 COLL VENOUS BLD VENIPUNCTURE: CPT | Performed by: INTERNAL MEDICINE

## 2020-11-11 ENCOUNTER — VIRTUAL VISIT (OUTPATIENT)
Dept: ENDOCRINOLOGY | Facility: CLINIC | Age: 60
End: 2020-11-11
Payer: MEDICARE

## 2020-11-11 DIAGNOSIS — E29.1 HYPOGONADISM MALE: ICD-10-CM

## 2020-11-11 DIAGNOSIS — Z09 HOSPITAL DISCHARGE FOLLOW-UP: ICD-10-CM

## 2020-11-11 DIAGNOSIS — E23.2 PRIMARY CENTRAL DIABETES INSIPIDUS (H): ICD-10-CM

## 2020-11-11 DIAGNOSIS — E03.8 SECONDARY HYPOTHYROIDISM: ICD-10-CM

## 2020-11-11 DIAGNOSIS — E23.2 DIABETES INSIPIDUS (H): ICD-10-CM

## 2020-11-11 DIAGNOSIS — E23.0 PANHYPOPITUITARISM (H): Primary | ICD-10-CM

## 2020-11-11 DIAGNOSIS — E27.49 SECONDARY ADRENAL INSUFFICIENCY (H): ICD-10-CM

## 2020-11-11 PROCEDURE — 99214 OFFICE O/P EST MOD 30 MIN: CPT | Mod: 95 | Performed by: INTERNAL MEDICINE

## 2020-11-11 RX ORDER — DESMOPRESSIN ACETATE 0.1 MG/1
0.1 TABLET ORAL AT BEDTIME
Qty: 60 TABLET | Refills: 3
Start: 2020-11-11 | End: 2020-11-17
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
## 2020-11-11 NOTE — PATIENT INSTRUCTIONS
Mykel & Savannah   It was a pleasure meeting you.    Central diabetes insipidus   -Discontinue morning DDAVP 50 mcg and continue with bedtime DDAVP 100 mcg daily.   -Follow free water replacement therapy per recent discharge recommendation which was 120 mL every 4 hours.  - check sodium level on 11/16/20. Order placed.   - change in mental status from baseline should prompt sodium level checks.   - If urine output increases to more than 2.5 or 3 L after reducing the dose of DDAVP; please let me know.     Secondary adrenal insufficiency  Continue Hydrocortisone (HC) 20mg in the AM, 10mg with dinner.    - sick day rules - increase hydrocortisone to 2-3X of the maintenance dose during sickness like flu.   -Needs injection if unable to keep medication down due to vomiting.       Central hypothyroidism   -continue Levothyroxine 150 mcg daily       Please let us know if any questions.
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
## 2020-11-17 ENCOUNTER — HOSPITAL ENCOUNTER (OUTPATIENT)
Dept: LAB | Facility: CLINIC | Age: 60
Discharge: HOME | End: 2020-11-17
Admitting: INTERNAL MEDICINE
Payer: MEDICARE

## 2020-11-17 ENCOUNTER — TELEPHONE (OUTPATIENT)
Dept: ENDOCRINOLOGY | Facility: CLINIC | Age: 60
End: 2020-11-17

## 2020-11-17 DIAGNOSIS — E23.2 DIABETES INSIPIDUS (H): Primary | ICD-10-CM

## 2020-11-17 DIAGNOSIS — E23.0 PANHYPOPITUITARISM (H): ICD-10-CM

## 2020-11-17 DIAGNOSIS — E23.2 DIABETES INSIPIDUS (H): ICD-10-CM

## 2020-11-17 LAB
SODIUM SERPL-SCNC: 133 MMOL/L (ref 133–144)
T4 FREE SERPL-MCNC: 1.18 NG/DL (ref 0.76–1.46)

## 2020-11-17 PROCEDURE — 84295 ASSAY OF SERUM SODIUM: CPT | Performed by: INTERNAL MEDICINE

## 2020-11-17 PROCEDURE — 36415 COLL VENOUS BLD VENIPUNCTURE: CPT | Performed by: INTERNAL MEDICINE

## 2020-11-17 PROCEDURE — 84439 ASSAY OF FREE THYROXINE: CPT | Performed by: INTERNAL MEDICINE

## 2020-11-17 RX ORDER — DESMOPRESSIN ACETATE 0.1 MG/1
0.05 TABLET ORAL AT BEDTIME
Qty: 60 TABLET | Refills: 3
Start: 2020-11-17 | End: 2020-12-15

## 2020-11-17 NOTE — TELEPHONE ENCOUNTER
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  During his recent hospitalization he was noted to have polyuria with up to 10 L in 24 hours in the setting of elevated sodium level at 168.  Patient responded to multiple DDAVP treatment.  At discharge patient was discharged on DDAVP 50 mcg in the morning and 100 mcg at bedtime.  follow up Na was 130 and dropped the 50 MCG of DDVAP.     Right now he is on DDAVP 100 mcg daily and blood work is as follows.        Ref. Range 11/17/2020 11:21   Sodium Latest Ref Range: 133 - 144 mmol/L 133   T4 Free Latest Ref Range: 0.76 - 1.46 ng/dL 1.18       Plan:  Reduce DDAVP to 50 mcg daily.   continue current water intake  Na check in 3-4 weeks.   Earlier Na check if urine output increases to > 2.5/3 liters per day.   Discussed with pt's mom who verbalized understanding.

## 2020-11-25 ENCOUNTER — TELEPHONE (OUTPATIENT)
Dept: PULMONOLOGY | Facility: CLINIC | Age: 60
End: 2020-11-25

## 2020-11-25 DIAGNOSIS — J69.0 ASPIRATION PNEUMONIA (H): ICD-10-CM

## 2020-11-25 RX ORDER — SCOPOLAMINE HYDROBROMIDE
POWDER (GRAM) MISCELLANEOUS
Qty: 90 BOTTLE | Refills: 3 | Status: SHIPPED | OUTPATIENT
Start: 2020-11-25 | End: 2021-06-08

## 2020-11-25 NOTE — TELEPHONE ENCOUNTER
Per Dr. Bermudez ok to refill until can be seen in clinic. RN called and spoke with pt's mother regarding refills. She requested that  call her to set up follow up apt.  RN sent message to  to do so.  Lakia Barton RN BSN

## 2020-12-08 ENCOUNTER — TELEPHONE (OUTPATIENT)
Dept: ENDOCRINOLOGY | Facility: CLINIC | Age: 60
End: 2020-12-08

## 2020-12-08 ENCOUNTER — HOSPITAL ENCOUNTER (OUTPATIENT)
Dept: LAB | Facility: CLINIC | Age: 60
Discharge: HOME | End: 2020-12-08
Admitting: INTERNAL MEDICINE
Payer: MEDICARE

## 2020-12-08 DIAGNOSIS — E23.2 DIABETES INSIPIDUS (H): Primary | ICD-10-CM

## 2020-12-08 DIAGNOSIS — E23.2 DIABETES INSIPIDUS (H): ICD-10-CM

## 2020-12-08 LAB — SODIUM SERPL-SCNC: 128 MMOL/L (ref 133–144)

## 2020-12-08 PROCEDURE — 36415 COLL VENOUS BLD VENIPUNCTURE: CPT | Performed by: INTERNAL MEDICINE

## 2020-12-08 PROCEDURE — 84295 ASSAY OF SERUM SODIUM: CPT | Performed by: INTERNAL MEDICINE

## 2020-12-08 NOTE — TELEPHONE ENCOUNTER
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  During his recent hospitalization he was noted to have polyuria with up to 10 L in 24 hours in the setting of elevated sodium level at 168.  Patient responded to multiple DDAVP treatment.  At discharge patient was discharged on DDAVP 50 mcg in the morning and 100 mcg at bedtime.  follow up Na was 130 and dropped the 50 MCG of DDVAP and continued 100 mcg daily.      Three weeks back based on Na of 133  DDAVP was reduced to 50 mcg daily.     Results for BERT BARAJAS (MRN [id number]) as of 12/8/2020 17:29   Ref. Range 12/8/2020 14:24   Sodium Latest Ref Range: 133 - 144 mmol/L 128 (L)        Today's lab as documented above low Na. Stop DDAVP.   Called pt's Mom twice but no one answerd. Please call pt's mom again and inform to stop DDAVP and recheck Na in 1-2 weeks.     Plan:  Discontinue  DDAVP  continue current water intake  Na check in 1 week.   Earlier Na check if urine output increases to > 2.5/3 liters per day.   .

## 2020-12-08 NOTE — PROGRESS NOTES
"Keyon Farias is a 60 year old male who is being evaluated via a billable video visit.      The patient has been notified of following:     \"This video visit will be conducted via a call between you and your physician/provider. We have found that certain health care needs can be provided without the need for an in-person physical exam.  This service lets us provide the care you need with a video conversation.  If a prescription is necessary we can send it directly to your pharmacy.  If lab work is needed we can place an order for that and you can then stop by our lab to have the test done at a later time.    Video visits are billed at different rates depending on your insurance coverage.  Please reach out to your insurance provider with any questions.    If during the course of the call the physician/provider feels a video visit is not appropriate, you will not be charged for this service.\"    Patient has given verbal consent for Video visit? Yes  How would you like to obtain your AVS? Mail a copy  If you are dropped from the video visit, the video invite should be resent to: Send to e-mail at:[email or web address]   Will anyone else be joining your video visit? No        Video-Visit Details    Type of service:  Video Visit    Video Start Time: 15:50  Video End Time: 16:40    Originating Location (pt. Location): Home    Distant Location (provider location):  [facility]     Platform used for Video Visit: [employer]    Keyon Farias is a 59 year old male with a history of TBI 30 years ago c/b aphasia, spastic hemiplegia, panhypopituitarism, central DI, VF arrest 2016, and recurrent aspiration pneumonia/pneumonitis requiring frequent admissions with recent ICU admission who presents for follow up.    Patient non-verbal at baseline, history provided by patient's mother, home care RN, and chart review.     Initially seen in Pulmonary clinic Sept 2019 following " "approximately 16 admissions during 2019, all but one for aspiration pneumonia/pneumonitis that sometimes complicated by sepsis. Prior evaluations by GI and ID. In April 2019 efforts made to discuss goals of care and have Palliative Care involved which greatly upset patient's mother. At first visit, patient's nurse described resistance to suctioning and tendency to swallow secretions rather than spitting out as directed. Feeding through J tube, meds in G tube. No benefit from changing tube feed timing/rate. Patient's mother wanted to get to point where patient could eat again, didn't accept idea that will not get back to where he was prior to 12/2016 admission when suffered seizures and cardiac arrest. Started on oral scopolamine.    Since last visit has been hospitalized at [facility] about 16 times, with all but one admission for aspiration PNA/pneumonitis +/- sepsis. Most recently admitted 10/25/20 with respiratory failure, ALMAS, and septic shock requiring intubation in the ED, admission to the MICU, and initiation of 3 pressors. Seen by Palliative team with patient's mother declining their services. Care team again discussed that unlikely would ever eat again. Ultimately patient's mother remained committed to pursing all treatment options and avoidance of TCU or long term care.    Today patient's mother reports he is doing much better. It took some days after coming home from the hospital but seems to have his personality back.  Denies any fevers. She checks for \"rattles\" in the chest and these have not occurred. Told to focus on making sure teeth and mouth are clean and has been working on this. Doing a mix of albuterol and mucomyst TID, swallows the secretions. Thinks scopolamine is helping. Gastric bag in place the last few months which maybe helping prevent some aspiration as putting out several hundred ml per day and patient no longer having projectile vomiting the way he use to.     Patient's mother Savannah " has a nurse in the house to help two days per week and getting a new nurse to help two other days. Asked if thought might reach the point where she wouldn't be able to care for Keyon at home and states that won't ever happen. Unwilling to consider an LTAC, states would get more help. Talked about Keyon's recent ICU admission and Savannah shared how scared it made her. Asked what she would want if Keyon became that sick again and Savannah said she would want everything and that she would not remove Keyon's status as Full Code. Her goal is to keep Keyon alive and get him back to an interesting life where can attend day programs.     Assessment and Recommendations  Recurrent Aspiration Pneumonia   In setting of prior TBI with likely significant dysphagia. Problem continues despite nutrition via J tube only and use of a gastric bag. Limited in ability to do airway clearance as patient does not cough out secretions or allow self to be suctioned. Ongoing oral scopolamine does seem to help and will continue. Hospitalized on average about 1-2 times per month for aspiration pneumonia. Attempts during clinic visits and hospital admissions to discuss goals of care with patient's mother who remains committed to aggressive measures. Her goal is to keep Keyon alive and at home with the hope of returning him to point where functional enough to eat and go to day programs.   - Continue oral scopolamine   - Continue TID nebs, as unable to cooperate with suctioning and swallows secretions, do not think benefits from increase in AWC  - Agree with efforts at increased oral cares  - Recommended working with PCP on gastric bag supplies  - Social Work involved during prior admissions and recommend consult if again admitted to the hospital    Follow up in Pulmonary clinic in 6 months    Discussed with Dr. Gerardo Bermudez MD PhD  Pulmonary Critical Care Fellow  [phone]

## 2020-12-09 ENCOUNTER — VIRTUAL VISIT (OUTPATIENT)
Dept: PULMONOLOGY | Facility: CLINIC | Age: 60
End: 2020-12-09
Attending: INTERNAL MEDICINE
Payer: MEDICARE

## 2020-12-09 DIAGNOSIS — J69.0 ASPIRATION PNEUMONIA OF BOTH LOWER LOBES, UNSPECIFIED ASPIRATION PNEUMONIA TYPE (H): Primary | ICD-10-CM

## 2020-12-09 PROCEDURE — 99215 OFFICE O/P EST HI 40 MIN: CPT | Mod: 95 | Performed by: STUDENT IN AN ORGANIZED HEALTH CARE EDUCATION/TRAINING PROGRAM

## 2020-12-09 NOTE — TELEPHONE ENCOUNTER
I was able to reach mom and she will stop the DDAVP and have him do a NA check next Tuesday unless his urine output is over 2-3 L then she will have the lab done sooner. She tells me he puts out about 1 L a day now. Stephany Redding RN on 12/9/2020 at 9:59 AM

## 2020-12-09 NOTE — LETTER
"    12/9/2020         RE: Keyon Farias  [address] [place] [address]        Dear Colleague,    Thank you for referring your patient, Keyon Farias, to the [facility] FOR LUNG SCIENCE AND [facility]. Please see a copy of my visit note below.    Keyon Farias is a 60 year old male who is being evaluated via a billable video visit.      The patient has been notified of following:     \"This video visit will be conducted via a call between you and your physician/provider. We have found that certain health care needs can be provided without the need for an in-person physical exam.  This service lets us provide the care you need with a video conversation.  If a prescription is necessary we can send it directly to your pharmacy.  If lab work is needed we can place an order for that and you can then stop by our lab to have the test done at a later time.    Video visits are billed at different rates depending on your insurance coverage.  Please reach out to your insurance provider with any questions.    If during the course of the call the physician/provider feels a video visit is not appropriate, you will not be charged for this service.\"    Patient has given verbal consent for Video visit? Yes  How would you like to obtain your AVS? Mail a copy  If you are dropped from the video visit, the video invite should be resent to: Send to e-mail at:[email or web address]   Will anyone else be joining your video visit? No        Video-Visit Details    Type of service:  Video Visit    Video Start Time: 15:50  Video End Time: 16:40    Originating Location (pt. 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    Patient non-verbal at baseline, history provided by patient's mother, home care RN, and chart review.     Initially seen in Pulmonary clinic Sept 2019 following approximately 16 admissions during 2019, all but one for aspiration pneumonia/pneumonitis that sometimes complicated by sepsis. Prior evaluations by GI and ID. In April 2019 efforts made to discuss goals of care and have Palliative Care involved which greatly upset patient's mother. At first visit, patient's nurse described resistance to suctioning and tendency to swallow secretions rather than spitting out as directed. Feeding through J tube, meds in G tube. No benefit from changing tube feed timing/rate. Patient's mother wanted to get to point where patient could eat again, didn't accept idea that will not get back to where he was prior to 12/2016 admission when suffered seizures and cardiac arrest. Started on oral scopolamine.    Since last visit has been hospitalized at [facility] about 16 times, with all but one admission for aspiration PNA/pneumonitis +/- sepsis. Most recently admitted 10/25/20 with respiratory failure, ALMAS, and septic shock requiring intubation in the ED, admission to the MICU, and initiation of 3 pressors. Seen by Palliative team with patient's mother declining their services. Care team again discussed that unlikely would ever eat again. Ultimately patient's mother remained committed to pursing all treatment options and avoidance of TCU or long term care.    Today patient's mother reports he is doing much better. It took some days after coming home from the hospital but seems to have his personality back.  Denies any fevers. She checks for \"rattles\" in the chest and these have not occurred. Told to focus on making sure teeth and mouth are clean and has been working on this. Doing a mix of albuterol and mucomyst TID, " swallows the secretions. Thinks scopolamine is helping. Gastric bag in place the last few months which maybe helping prevent some aspiration as putting out several hundred ml per day and patient no longer having projectile vomiting the way he use to.     Patient's mother Savannah has a nurse in the house to help two days per week and getting a new nurse to help two other days. Asked if thought might reach the point where she wouldn't be able to care for Keyon at home and states that won't ever happen. Unwilling to consider an LTAC, states would get more help. Talked about Keyon's recent ICU admission and Savannah shared how scared it made her. Asked what she would want if Keyon became that sick again and Savannah said she would want everything and that she would not remove Keyon's status as Full Code. Her goal is to keep Keyon alive and get him back to an interesting life where can attend day programs.     Assessment and Recommendations  Recurrent Aspiration Pneumonia   In setting of prior TBI with likely significant dysphagia. Problem continues despite nutrition via J tube only and use of a gastric bag. Limited in ability to do airway clearance as patient does not cough out secretions or allow self to be suctioned. Ongoing oral scopolamine does seem to help and will continue. Hospitalized on average about 1-2 times per month for aspiration pneumonia. Attempts during clinic visits and hospital admissions to discuss goals of care with patient's mother who remains committed to aggressive measures. Her goal is to keep Keyon alive and at home with the hope of returning him to point where functional enough to eat and go to day programs.   - Continue oral scopolamine   - Continue TID nebs, as unable to cooperate with suctioning and swallows secretions, do not think benefits from increase in AWC  - Agree with efforts at increased oral cares  - Recommended working with PCP on gastric bag supplies  - Social Work involved during prior  admissions and recommend consult if again admitted to the hospital    Follow up in Pulmonary clinic in 6 months    Discussed with Dr. Gerardo Bermudez MD PhD  Pulmonary Critical Care Fellow  [phone]              Attestation signed by Elen Carrillo MD at 12/21/2020 10:56 AM:  Pulmonary Staff:  I have discussed Mr. Farias's case with Dr. Bermudez; reviewed the patient's imaging and met with him via video-phone.  I agree with the findings, assessment and recommendations as outlined below by Dr. Bermudez.      Elen Carrillo MD  #[id number]  12/9/2020      Again, thank you for allowing me to participate in the care of your patient.        Sincerely,        Jennie Bermudez MD

## 2020-12-15 ENCOUNTER — TELEPHONE (OUTPATIENT)
Dept: ENDOCRINOLOGY | Facility: CLINIC | Age: 60
End: 2020-12-15

## 2020-12-15 ENCOUNTER — HOSPITAL ENCOUNTER (OUTPATIENT)
Dept: LAB | Facility: CLINIC | Age: 60
Discharge: HOME | End: 2020-12-15
Admitting: INTERNAL MEDICINE
Payer: MEDICARE

## 2020-12-15 DIAGNOSIS — E23.2 DIABETES INSIPIDUS (H): ICD-10-CM

## 2020-12-15 DIAGNOSIS — E23.2 DIABETES INSIPIDUS (H): Primary | ICD-10-CM

## 2020-12-15 LAB — SODIUM SERPL-SCNC: 126 MMOL/L (ref 133–144)

## 2020-12-15 PROCEDURE — 36415 COLL VENOUS BLD VENIPUNCTURE: CPT | Performed by: INTERNAL MEDICINE

## 2020-12-15 PROCEDURE — 84295 ASSAY OF SERUM SODIUM: CPT | Performed by: INTERNAL MEDICINE

## 2020-12-15 NOTE — TELEPHONE ENCOUNTER
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      During his recent hospitalization he was noted to have polyuria with up to 10 L in 24 hours in the setting of elevated sodium level at 168.  Patient responded to DDAVP treatment.  At discharge patient was discharged on DDAVP which was subsequently reduced to 50 mcg daily and stopped on 12/8/20.     Now Na continues to drop without DDAVP RX. He has history of hyponatremia in the past ([facility]).     Suspect hyponatremia might be indicative of other issues including possible recurrent pneumonia.       Ref. Range 12/8/2020 14:24 12/15/2020 13:19   Sodium Latest Ref Range: 133 - 144 mmol/L 128 (L) 126 (L)     Attempted to reach patient's mom niharika 12/15/20 however no one answered. Answering machine is full and didn't leave a message.    Please call pt's mom and discuss that pt needs to be evaluated due to persistent  Hyponatremia particularly if he has change in his mental status from baseline (advise ER visit - he is prone for recurrent aspiration pneumonia).   Please advise pt to stop DDAVP if he is currently taking (was advised to stop 12/9/20 but please advise to double check in case it is still in his pill box).    Attempted to reach pt's mom on 12/16/20; no one answered and answering is full.

## 2020-12-16 ENCOUNTER — TELEPHONE (OUTPATIENT)
Dept: ENDOCRINOLOGY | Facility: CLINIC | Age: 60
End: 2020-12-16

## 2020-12-16 NOTE — TELEPHONE ENCOUNTER
I spoke with Mom and she will cut fluids to 1 L in flushing. She has been giving him 1.5 L . He will do labs again tomorrow . She was instructed per Endocrine sodium bicab not needed. SHe was wondering if the sodium bicarb is affecting his seizures which  Is a question for the PCP and  Seizure providers. Stephany Redding RN on 12/16/2020 at 4:27 PM

## 2020-12-16 NOTE — TELEPHONE ENCOUNTER
Mom is asking if she should give him sodium bicarb they have at home  650 mg tablets ? Dr Gomez prescribed them. The nurses per mom has been giving him 1 tablet 5 days a week. She doesn't give him any the other days. Stephany Redding RN on 12/16/2020 at 3:31 PM

## 2020-12-16 NOTE — TELEPHONE ENCOUNTER
Mom did stop the DDAVP removing from box. She doesn't see any mental status change but felt he was having a strange seizure  like behavior. She is waiting for a call from Neurologist  on this . Mom  questions if she gives too much water when flushing tubes. She doesn't feel he is having any mental status changes at this time . She did give verbal understanding to ER when warranted. Stephany Redding RN on 12/16/2020 at 3:25 PM

## 2020-12-17 ENCOUNTER — HOSPITAL ENCOUNTER (OUTPATIENT)
Dept: LAB | Facility: CLINIC | Age: 60
Discharge: HOME | End: 2020-12-17
Attending: INTERNAL MEDICINE | Admitting: INTERNAL MEDICINE
Payer: MEDICARE

## 2020-12-17 ENCOUNTER — TELEPHONE (OUTPATIENT)
Dept: ENDOCRINOLOGY | Facility: CLINIC | Age: 60
End: 2020-12-17

## 2020-12-17 DIAGNOSIS — E23.0 PANHYPOPITUITARISM (H): Primary | ICD-10-CM

## 2020-12-17 DIAGNOSIS — E87.6 HYPOKALEMIA: Primary | ICD-10-CM

## 2020-12-17 DIAGNOSIS — E87.1 HYPONATREMIA: ICD-10-CM

## 2020-12-17 DIAGNOSIS — E23.2 DIABETES INSIPIDUS (H): ICD-10-CM

## 2020-12-17 LAB
ANION GAP SERPL CALCULATED.3IONS-SCNC: 3 MMOL/L (ref 3–14)
BUN SERPL-MCNC: 21 MG/DL (ref 7–30)
CALCIUM SERPL-MCNC: 9 MG/DL (ref 8.5–10.1)
CHLORIDE SERPL-SCNC: 90 MMOL/L (ref 94–109)
CO2 SERPL-SCNC: 37 MMOL/L (ref 20–32)
CREAT SERPL-MCNC: 0.79 MG/DL (ref 0.66–1.25)
GFR SERPL CREATININE-BSD FRML MDRD: >90 ML/MIN/{1.73_M2}
GLUCOSE SERPL-MCNC: 151 MG/DL (ref 70–99)
POTASSIUM SERPL-SCNC: 3.1 MMOL/L (ref 3.4–5.3)
SODIUM SERPL-SCNC: 130 MMOL/L (ref 133–144)

## 2020-12-17 PROCEDURE — 80048 BASIC METABOLIC PNL TOTAL CA: CPT | Performed by: INTERNAL MEDICINE

## 2020-12-17 PROCEDURE — 36415 COLL VENOUS BLD VENIPUNCTURE: CPT | Performed by: INTERNAL MEDICINE

## 2020-12-17 RX ORDER — POTASSIUM CHLORIDE 1.5 G/1.58G
20 POWDER, FOR SOLUTION ORAL 2 TIMES DAILY
Status: CANCELLED | OUTPATIENT
Start: 2020-12-17

## 2020-12-17 RX ORDER — POTASSIUM CHLORIDE 1.5 G/1.58G
20 POWDER, FOR SOLUTION ORAL DAILY
Qty: 30 TABLET | Refills: 0 | Status: ON HOLD | OUTPATIENT
Start: 2020-12-17 | End: 2021-01-15

## 2020-12-17 NOTE — TELEPHONE ENCOUNTER
"Spoke w/ Pts mother: States they \"cut his water level down\" to 2L per day. Sometimes approx 1L per day. Requests we mail Dr Mclean message to her.   Message mailed.   Provider notified regarding schedule.   Etta Curry RN on 12/17/2020 at 3:01 PM         PLEASE HELP SCHEDULE THE FOLLOWING APPT(S): Return Appointment    TIME FRAME NEEDED BY: Other (specify in comments.)       SCHEDULING COMMENTS (optional): w/i 1-3 weeks with Dr Mclean per notes      RE    Faizan Mclean MD  [facility]             Please inform to patient's mom that Na has improved to 130 from 126. Continue to administer water at the amount recommended by the dietician at the time of hospital discharge. Monitor urine output per instructions previously provided; > 3L per day of urine should prompt Na check. Potassium is slightly low; I have sent prescription for potassium supplement to pharmacy. Please administer per instructions.   Please schedule a virtual visit with me in 1-3 weeks at available spot.        "

## 2020-12-17 NOTE — RESULT ENCOUNTER NOTE
Please inform to patient's mom that Na has improved to 130 from 126. Continue to administer water at the amount recommended by the dietician at the time of hospital discharge. Monitor urine output per instructions previously provided; > 3L per day of urine should prompt Na check. Potassium is slightly low; I have sent prescription for potassium supplement to pharmacy. Please administer per instructions.   Please schedule a virtual visit with me in 1-3 weeks at available spot.

## 2020-12-17 NOTE — TELEPHONE ENCOUNTER
----- Message from Faizan Mclean MD sent at 12/17/2020  2:54 PM CST -----  Please inform to patient's mom that Na has improved to 130 from 126. Continue to administer water at the amount recommended by the dietician at the time of hospital discharge. Monitor urine output per instructions previously provided; > 3L per day of urine should prompt Na check. Potassium is slightly low; I have sent prescription for potassium supplement to pharmacy. Please administer per instructions.   Please schedule a virtual visit with me in 1-3 weeks at available spot.

## 2020-12-18 ENCOUNTER — APPOINTMENT (OUTPATIENT)
Dept: GENERAL RADIOLOGY | Facility: CLINIC | Age: 60
End: 2020-12-18
Attending: EMERGENCY MEDICINE
Payer: MEDICARE

## 2020-12-18 ENCOUNTER — HOSPITAL ENCOUNTER (OUTPATIENT)
Facility: CLINIC | Age: 60
Setting detail: OBSERVATION
Discharge: HOME | End: 2020-12-20
Attending: EMERGENCY MEDICINE | Admitting: EMERGENCY MEDICINE
Payer: MEDICARE

## 2020-12-18 DIAGNOSIS — R40.4 TRANSIENT ALTERATION OF AWARENESS: ICD-10-CM

## 2020-12-18 DIAGNOSIS — Z87.898 HISTORY OF SEIZURE: ICD-10-CM

## 2020-12-18 LAB
ANION GAP SERPL CALCULATED.3IONS-SCNC: 5 MMOL/L (ref 3–14)
BASOPHILS # BLD AUTO: 0.1 10E9/L (ref 0–0.2)
BASOPHILS NFR BLD AUTO: 1 %
BUN SERPL-MCNC: 19 MG/DL (ref 7–30)
CALCIUM SERPL-MCNC: 9.2 MG/DL (ref 8.5–10.1)
CARBAMAZEPINE SERPL-MCNC: 15.4 MG/L (ref 4–12)
CHLORIDE SERPL-SCNC: 89 MMOL/L (ref 94–109)
CO2 SERPL-SCNC: 38 MMOL/L (ref 20–32)
CREAT SERPL-MCNC: 0.86 MG/DL (ref 0.66–1.25)
DIFFERENTIAL METHOD BLD: NORMAL
EOSINOPHIL # BLD AUTO: 0.5 10E9/L (ref 0–0.7)
EOSINOPHIL NFR BLD AUTO: 4.7 %
ERYTHROCYTE [DISTWIDTH] IN BLOOD BY AUTOMATED COUNT: 14.3 % (ref 10–15)
GFR SERPL CREATININE-BSD FRML MDRD: >90 ML/MIN/{1.73_M2}
GLUCOSE SERPL-MCNC: 87 MG/DL (ref 70–99)
HCT VFR BLD AUTO: 40.4 % (ref 40–53)
HGB BLD-MCNC: 13.3 G/DL (ref 13.3–17.7)
IMM GRANULOCYTES # BLD: 0.1 10E9/L (ref 0–0.4)
IMM GRANULOCYTES NFR BLD: 0.6 %
INTERPRETATION ECG - MUSE: NORMAL
LACTATE BLD-SCNC: 2.3 MMOL/L (ref 0.7–2)
LYMPHOCYTES # BLD AUTO: 2.1 10E9/L (ref 0.8–5.3)
LYMPHOCYTES NFR BLD AUTO: 21.6 %
MCH RBC QN AUTO: 28.8 PG (ref 26.5–33)
MCHC RBC AUTO-ENTMCNC: 32.9 G/DL (ref 31.5–36.5)
MCV RBC AUTO: 87 FL (ref 78–100)
MONOCYTES # BLD AUTO: 1.2 10E9/L (ref 0–1.3)
MONOCYTES NFR BLD AUTO: 12.2 %
NEUTROPHILS # BLD AUTO: 5.8 10E9/L (ref 1.6–8.3)
NEUTROPHILS NFR BLD AUTO: 59.9 %
NRBC # BLD AUTO: 0 10*3/UL
NRBC BLD AUTO-RTO: 0 /100
PLATELET # BLD AUTO: 213 10E9/L (ref 150–450)
POTASSIUM SERPL-SCNC: 3 MMOL/L (ref 3.4–5.3)
PROCALCITONIN SERPL-MCNC: <0.05 NG/ML
RBC # BLD AUTO: 4.62 10E12/L (ref 4.4–5.9)
SODIUM SERPL-SCNC: 132 MMOL/L (ref 133–144)
WBC # BLD AUTO: 9.6 10E9/L (ref 4–11)

## 2020-12-18 PROCEDURE — 93005 ELECTROCARDIOGRAM TRACING: CPT

## 2020-12-18 PROCEDURE — 83605 ASSAY OF LACTIC ACID: CPT | Performed by: EMERGENCY MEDICINE

## 2020-12-18 PROCEDURE — G0378 HOSPITAL OBSERVATION PER HR: HCPCS

## 2020-12-18 PROCEDURE — 71045 X-RAY EXAM CHEST 1 VIEW: CPT

## 2020-12-18 PROCEDURE — 250N000013 HC RX MED GY IP 250 OP 250 PS 637: Performed by: HOSPITALIST

## 2020-12-18 PROCEDURE — U0003 INFECTIOUS AGENT DETECTION BY NUCLEIC ACID (DNA OR RNA); SEVERE ACUTE RESPIRATORY SYNDROME CORONAVIRUS 2 (SARS-COV-2) (CORONAVIRUS DISEASE [COVID-19]), AMPLIFIED PROBE TECHNIQUE, MAKING USE OF HIGH THROUGHPUT TECHNOLOGIES AS DESCRIBED BY CMS-2020-01-R: HCPCS | Performed by: EMERGENCY MEDICINE

## 2020-12-18 PROCEDURE — 258N000003 HC RX IP 258 OP 636: Performed by: HOSPITALIST

## 2020-12-18 PROCEDURE — 80156 ASSAY CARBAMAZEPINE TOTAL: CPT | Performed by: EMERGENCY MEDICINE

## 2020-12-18 PROCEDURE — 36415 COLL VENOUS BLD VENIPUNCTURE: CPT | Performed by: HOSPITALIST

## 2020-12-18 PROCEDURE — 85025 COMPLETE CBC W/AUTO DIFF WBC: CPT | Performed by: EMERGENCY MEDICINE

## 2020-12-18 PROCEDURE — 80048 BASIC METABOLIC PNL TOTAL CA: CPT | Performed by: EMERGENCY MEDICINE

## 2020-12-18 PROCEDURE — 99220 PR INITIAL OBSERVATION CARE,LEVEL III: CPT | Performed by: HOSPITALIST

## 2020-12-18 PROCEDURE — C9803 HOPD COVID-19 SPEC COLLECT: HCPCS

## 2020-12-18 PROCEDURE — 99285 EMERGENCY DEPT VISIT HI MDM: CPT | Mod: 25

## 2020-12-18 PROCEDURE — 84145 PROCALCITONIN (PCT): CPT | Performed by: EMERGENCY MEDICINE

## 2020-12-18 RX ORDER — POLYETHYLENE GLYCOL 3350 17 G/17G
17 POWDER, FOR SOLUTION ORAL DAILY PRN
Status: DISCONTINUED | OUTPATIENT
Start: 2020-12-18 | End: 2020-12-20 | Stop reason: HOSPADM

## 2020-12-18 RX ORDER — LORAZEPAM 2 MG/ML
2 INJECTION INTRAMUSCULAR
Status: DISCONTINUED | OUTPATIENT
Start: 2020-12-18 | End: 2020-12-20 | Stop reason: HOSPADM

## 2020-12-18 RX ORDER — ONDANSETRON 4 MG/1
4 TABLET, ORALLY DISINTEGRATING ORAL EVERY 6 HOURS PRN
Status: DISCONTINUED | OUTPATIENT
Start: 2020-12-18 | End: 2020-12-20 | Stop reason: HOSPADM

## 2020-12-18 RX ORDER — ONDANSETRON 2 MG/ML
4 INJECTION INTRAMUSCULAR; INTRAVENOUS EVERY 6 HOURS PRN
Status: DISCONTINUED | OUTPATIENT
Start: 2020-12-18 | End: 2020-12-20 | Stop reason: HOSPADM

## 2020-12-18 RX ORDER — AMOXICILLIN 250 MG
2 CAPSULE ORAL 2 TIMES DAILY PRN
Status: DISCONTINUED | OUTPATIENT
Start: 2020-12-18 | End: 2020-12-20 | Stop reason: HOSPADM

## 2020-12-18 RX ORDER — ACETAMINOPHEN 650 MG/1
650 SUPPOSITORY RECTAL EVERY 4 HOURS PRN
Status: DISCONTINUED | OUTPATIENT
Start: 2020-12-18 | End: 2020-12-20 | Stop reason: HOSPADM

## 2020-12-18 RX ORDER — POTASSIUM CHLORIDE 1.5 G/1.58G
40 POWDER, FOR SOLUTION ORAL ONCE
Status: COMPLETED | OUTPATIENT
Start: 2020-12-18 | End: 2020-12-18

## 2020-12-18 RX ORDER — POTASSIUM CHLORIDE 1.5 G/1.58G
20 POWDER, FOR SOLUTION ORAL ONCE
Status: COMPLETED | OUTPATIENT
Start: 2020-12-18 | End: 2020-12-19

## 2020-12-18 RX ORDER — AMOXICILLIN 250 MG
1 CAPSULE ORAL 2 TIMES DAILY PRN
Status: DISCONTINUED | OUTPATIENT
Start: 2020-12-18 | End: 2020-12-20 | Stop reason: HOSPADM

## 2020-12-18 RX ORDER — LIDOCAINE 40 MG/G
CREAM TOPICAL
Status: DISCONTINUED | OUTPATIENT
Start: 2020-12-18 | End: 2020-12-20 | Stop reason: HOSPADM

## 2020-12-18 RX ORDER — ACETAMINOPHEN 325 MG/1
650 TABLET ORAL EVERY 4 HOURS PRN
Status: DISCONTINUED | OUTPATIENT
Start: 2020-12-18 | End: 2020-12-20 | Stop reason: HOSPADM

## 2020-12-18 RX ADMIN — POTASSIUM CHLORIDE 40 MEQ: 1.5 POWDER, FOR SOLUTION ORAL at 22:13

## 2020-12-18 RX ADMIN — SODIUM CHLORIDE 500 ML: 9 INJECTION, SOLUTION INTRAVENOUS at 21:38

## 2020-12-18 NOTE — ED TRIAGE NOTES
"Arrives via EMS from home. Was receiving a neb from his new home nurse and she repoprted to EMS that Keyon \"zoned out\". Mother told EMS that his K+ has been off lately and wants it checked  "

## 2020-12-18 NOTE — ED PROVIDER NOTES
"History     Chief Complaint:  Potassium Check     HPI  Keyon Farias is a 60 year old male with a history of DVT, septic shock, GERD, TBI, nonverbal who presents for evaluation of altered status. The patient underwent BMP draw yesterday with lab values below. Of note the patient was admitted to the The patient is non verbal and was at his residence being treated with a Neb by his at home nurse when she states that he \"zoned out.\" The nurse contacted his mother and she notes that his potassium his been liable, and wanted him to be brought to the ED.     BMP 12/17: Na 130 (L) Potassium 3.1 (L) Chloride 90 (L) CO2 37 (H) Glucose 151 (H) o/w WNL      Addendum Mother:   Mother reportgts that the patient has been doing well besides trended hyponatremia and in response they lowered his water intake. She was informed that his sodium and potassium levels could be correlated to recent altered moments. The patient does not enjoy taking his nebs and was more combative than baseline today. Dilated eyes were noted and he was altered for around an hour. She is unsure if this was seizure like. He had his condom catheter changed this morning, but he frequently pulls it out.     Allergies:  Valproic acid  Dilantin  Scopolamine     Medications:    Brivaracetam  Miralax  Tegretol  Cholecalciferol  Vitamin D3  Balmex  Asa 81 mg  Melatonin 3 mg  Testosterone  Protonix  Albuterol  Mucomyst  Hydrocortisone  Duoneb  Bactroban  Synthroid  Reglan      Past Medical History:    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  DVT upper extremity  GERD  Panhypopituitarism  Pneumonia  Seizures  Septic shock  Spastic hemiplegia affecting dominant side  Thyroid disease  Cerebral artery occlusion with cerebral infarction  Ventricular fibrillation  Ventricular tachyarrhythmia  HLD  Hypocalcemia  Bladder calculus  Hemiparesis  Asthma     Past Surgical History:    Appendectomy  EGD  Gastro jejunostomy tube change  Tracheostomy     Family History:    Cancer     Social " History:  The patient presents to the emergency department by himself via EMS.  Patient lives at home with his mother.     Review of Systems   Reason unable to perform ROS: Nonverbal.         Physical Exam     Patient Vitals for the past 24 hrs:   BP Temp Temp src Pulse Resp SpO2 Weight   12/18/20 2058 124/67 97.9  F (36.6  C) Axillary 89 20 95 % 72.3 kg (159 lb 8 oz)   12/18/20 2048 124/69 -- -- 89 18 97 % --   12/18/20 2010 120/56 -- -- 96 -- 95 % --   12/18/20 1907 104/75 -- -- 94 -- 92 % --   12/18/20 1606 103/73 98.5  F (36.9  C) Temporal 105 18 95 % --          Physical Exam    GENERAL: nonverbal  HEAD: atraumatic  EYES: pupils reactive, extraocular muscles intact, conjunctivae normal  ENT:  mucus membranes moist  NECK:  trachea midline, normal range of motion  RESPIRATORY: no tachypnea, breath sounds clear to auscultation   CVS: normal S1/S2, no murmurs, intact distal pulses  ABDOMEN: soft, nontender, nondistention,  GJ tube in place in the left lower quadrant of abdomen  : indwelling catheter  MUSCULOSKELETAL: no deformities  SKIN: warm and dry, no acute rashes or ulceration  NEURO: GCS 15, cranial nerves intact, eyes open looking around the room. spastic paralysis    PSYCH:  Mood/affect normal    Emergency Department Course     ECG:  ECG taken at 1611  Sinus tachycardia   Left anterior fascicular block  Abnormal ECG  Rate 104 bpm. AZ interval 160 ms. QRS duration 88 ms. QT/QTc 370/486 ms. P-R-T axes 53 -53 31.     Imaging:    XR Chest Port 1 View  Low lung volumes. This exaggerates bilateral  atelectasis/consolidation. Elevated right hemidiaphragm. The  appearance is similar to 11/1/2020. No pleural effusion or  pneumothorax. The cardiac size is normal. Stable narrowing of the  trachea at the thoracic inlet.    JULIÁN MURO MD    Reading per radiology     Laboratory:    CBC: WBC 9.6, HGB 13.3,    BMP: CO2 38 (H) Na 132 (H) Potassium 3.0 (L) Chloride 89 (L) o/w WNL (Creatinine 0.86)  1656: Lactic  Acid: 2.3 (H)    Carbamazepine: 15.4 (HH)  Procalcitonin: IP    Emergency Department Course:    Reviewed:  1608 I reviewed the patient's nursing notes, vitals, past medical records, Care Everywhere.     Assessments:  1625 I spoke with the patient's mother.      Consults:  1926 I spoke with the patient's neurologist, who recommends admission for EEG.    1938 I spoke with Dr. Meier of the hospitalist service regarding patient's presentation, findings, and plan of care.     Disposition:  Patient admitted.    Impression & Plan     Medical Decision Making:  Keyon Farias is a 60 year old male who presents with sort of altered mental status.  History is gathered from the mother.  Sound like he was getting upset with getting a neb and then became unresponsive for proximately an hour.  Mother is vague in terms of his seizure descriptions.  Here he is awake and looking around.  Certainly seizure is considered versus other etiologies.  Did get a call from his neurologist who got a call from the mother who would like him admitted for a EEG.  Spoke with the hospitalist regarding admission.  Rest of his work-up looks largely normal.  Lactic acid is slightly elevated although I do not suspect sepsis.  Mother notes that he is actually been doing quite well.    Covid-19  Keyon Farias was evaluated during a global COVID-19 pandemic, which necessitated consideration that the patient might be at risk for infection with the SARS-CoV-2 virus that causes COVID-19.   Applicable protocols for evaluation were followed during the patient's care.   COVID-19 was considered as part of the patient's evaluation. The plan for testing is:  a test was obtained during this visit.    Diagnosis:     ICD-10-CM    1. Transient alteration of awareness  R40.4 Procalcitonin     Asymptomatic COVID-19 Virus (Coronavirus) by PCR   2. History of seizure  Z87.898         Disposition:  Admitted to Dr. Meier.    Scribe Disclosure:  I, Juan Manuel Jin, am serving  as a scribe at 4:09 PM on 12/18/2020 to document services personally performed by Randy Avila MD based on my observations and the provider's statements to me.     This note was completed in part using Dragon voice recognition software. Although reviewed after completion, some word and grammatical errors may occur.     [facility]  December 18, 2020      Randy Avila MD  12/18/20 0106

## 2020-12-18 NOTE — ED NOTES
DATE:  12/18/2020   TIME OF RECEIPT FROM LAB:  1889  LAB TEST:  Carbamezapine   LAB VALUE:  15.4  RESULTS GIVEN WITH READ-BACK TO (PROVIDER):  Randy Avila MD  TIME LAB VALUE REPORTED TO PROVIDER:   0988

## 2020-12-18 NOTE — ED NOTES
Bed: ED22  Expected date:   Expected time:   Means of arrival:   Comments:  Kerri 2 Weakness 60 male

## 2020-12-19 ENCOUNTER — HOSPITAL ENCOUNTER (OUTPATIENT)
Dept: NEUROLOGY | Facility: CLINIC | Age: 60
End: 2020-12-19
Attending: HOSPITALIST
Payer: MEDICARE

## 2020-12-19 LAB
ALBUMIN SERPL-MCNC: 3.1 G/DL (ref 3.4–5)
ALBUMIN UR-MCNC: 30 MG/DL
ALP SERPL-CCNC: 97 U/L (ref 40–150)
ALT SERPL W P-5'-P-CCNC: 25 U/L (ref 0–70)
ANION GAP SERPL CALCULATED.3IONS-SCNC: 4 MMOL/L (ref 3–14)
APPEARANCE UR: CLEAR
AST SERPL W P-5'-P-CCNC: 22 U/L (ref 0–45)
BASE EXCESS BLDV CALC-SCNC: 7.6 MMOL/L
BILIRUB DIRECT SERPL-MCNC: 0.2 MG/DL (ref 0–0.2)
BILIRUB SERPL-MCNC: 0.4 MG/DL (ref 0.2–1.3)
BILIRUB UR QL STRIP: NEGATIVE
BUN SERPL-MCNC: 18 MG/DL (ref 7–30)
CALCIUM SERPL-MCNC: 8.6 MG/DL (ref 8.5–10.1)
CARBAMAZEPINE SERPL-MCNC: 10 MG/L (ref 4–12)
CHLORIDE SERPL-SCNC: 97 MMOL/L (ref 94–109)
CO2 SERPL-SCNC: 31 MMOL/L (ref 20–32)
COLOR UR AUTO: YELLOW
CREAT SERPL-MCNC: 0.8 MG/DL (ref 0.66–1.25)
ERYTHROCYTE [DISTWIDTH] IN BLOOD BY AUTOMATED COUNT: 14.4 % (ref 10–15)
GFR SERPL CREATININE-BSD FRML MDRD: >90 ML/MIN/{1.73_M2}
GLUCOSE SERPL-MCNC: 95 MG/DL (ref 70–99)
GLUCOSE UR STRIP-MCNC: NEGATIVE MG/DL
HCO3 BLDV-SCNC: 33 MMOL/L (ref 21–28)
HCT VFR BLD AUTO: 37.7 % (ref 40–53)
HGB BLD-MCNC: 12.3 G/DL (ref 13.3–17.7)
HGB UR QL STRIP: NEGATIVE
KETONES UR STRIP-MCNC: NEGATIVE MG/DL
LABORATORY COMMENT REPORT: NORMAL
LACTATE BLD-SCNC: 1.8 MMOL/L (ref 0.7–2)
LEUKOCYTE ESTERASE UR QL STRIP: NEGATIVE
MAGNESIUM SERPL-MCNC: 2.3 MG/DL (ref 1.6–2.3)
MCH RBC QN AUTO: 28.5 PG (ref 26.5–33)
MCHC RBC AUTO-ENTMCNC: 32.6 G/DL (ref 31.5–36.5)
MCV RBC AUTO: 88 FL (ref 78–100)
NITRATE UR QL: NEGATIVE
OXYHGB MFR BLDV: 96 %
PCO2 BLDV: 47 MM HG (ref 40–50)
PH BLDV: 7.45 PH (ref 7.32–7.43)
PH UR STRIP: 8.5 PH (ref 5–7)
PHOSPHATE SERPL-MCNC: 2.8 MG/DL (ref 2.5–4.5)
PLATELET # BLD AUTO: 205 10E9/L (ref 150–450)
PO2 BLDV: 100 MM HG (ref 25–47)
POTASSIUM SERPL-SCNC: 3.6 MMOL/L (ref 3.4–5.3)
PROT SERPL-MCNC: 7.7 G/DL (ref 6.8–8.8)
RBC # BLD AUTO: 4.31 10E12/L (ref 4.4–5.9)
RBC #/AREA URNS AUTO: <1 /HPF (ref 0–2)
SARS-COV-2 RNA SPEC QL NAA+PROBE: NEGATIVE
SARS-COV-2 RNA SPEC QL NAA+PROBE: NORMAL
SODIUM SERPL-SCNC: 132 MMOL/L (ref 133–144)
SODIUM SERPL-SCNC: 134 MMOL/L (ref 133–144)
SOURCE: ABNORMAL
SP GR UR STRIP: 1.02 (ref 1–1.03)
SPECIMEN SOURCE: NORMAL
SPECIMEN SOURCE: NORMAL
SQUAMOUS #/AREA URNS AUTO: 1 /HPF (ref 0–1)
UROBILINOGEN UR STRIP-MCNC: 2 MG/DL (ref 0–2)
WBC # BLD AUTO: 9.1 10E9/L (ref 4–11)
WBC #/AREA URNS AUTO: 1 /HPF (ref 0–5)

## 2020-12-19 PROCEDURE — 85027 COMPLETE CBC AUTOMATED: CPT | Performed by: HOSPITALIST

## 2020-12-19 PROCEDURE — 80048 BASIC METABOLIC PNL TOTAL CA: CPT | Performed by: HOSPITALIST

## 2020-12-19 PROCEDURE — 99207 PR MOONLIGHTING INDICATOR: CPT | Performed by: PHYSICIAN ASSISTANT

## 2020-12-19 PROCEDURE — 250N000013 HC RX MED GY IP 250 OP 250 PS 637: Performed by: PHYSICIAN ASSISTANT

## 2020-12-19 PROCEDURE — 80156 ASSAY CARBAMAZEPINE TOTAL: CPT | Performed by: PSYCHIATRY & NEUROLOGY

## 2020-12-19 PROCEDURE — 250N000013 HC RX MED GY IP 250 OP 250 PS 637: Performed by: PSYCHIATRY & NEUROLOGY

## 2020-12-19 PROCEDURE — 95816 EEG AWAKE AND DROWSY: CPT | Mod: 26 | Performed by: PSYCHIATRY & NEUROLOGY

## 2020-12-19 PROCEDURE — 272N000078 HC NUTRITION PRODUCT INTERMEDIATE LITER

## 2020-12-19 PROCEDURE — 94640 AIRWAY INHALATION TREATMENT: CPT

## 2020-12-19 PROCEDURE — 82805 BLOOD GASES W/O2 SATURATION: CPT | Performed by: HOSPITALIST

## 2020-12-19 PROCEDURE — 250N000009 HC RX 250: Performed by: PHYSICIAN ASSISTANT

## 2020-12-19 PROCEDURE — 99225 PR SUBSEQUENT OBSERVATION CARE,LEVEL II: CPT | Performed by: PHYSICIAN ASSISTANT

## 2020-12-19 PROCEDURE — 250N000013 HC RX MED GY IP 250 OP 250 PS 637: Mod: GY | Performed by: PHYSICIAN ASSISTANT

## 2020-12-19 PROCEDURE — 80076 HEPATIC FUNCTION PANEL: CPT | Performed by: HOSPITALIST

## 2020-12-19 PROCEDURE — 83735 ASSAY OF MAGNESIUM: CPT | Performed by: HOSPITALIST

## 2020-12-19 PROCEDURE — 84100 ASSAY OF PHOSPHORUS: CPT | Performed by: HOSPITALIST

## 2020-12-19 PROCEDURE — G0378 HOSPITAL OBSERVATION PER HR: HCPCS

## 2020-12-19 PROCEDURE — 81001 URINALYSIS AUTO W/SCOPE: CPT | Performed by: HOSPITALIST

## 2020-12-19 PROCEDURE — 999N000157 HC STATISTIC RCP TIME EA 10 MIN

## 2020-12-19 PROCEDURE — 83605 ASSAY OF LACTIC ACID: CPT | Performed by: HOSPITALIST

## 2020-12-19 PROCEDURE — 84295 ASSAY OF SERUM SODIUM: CPT | Performed by: PSYCHIATRY & NEUROLOGY

## 2020-12-19 PROCEDURE — 999N000052 EEG ROUTINE

## 2020-12-19 PROCEDURE — 36415 COLL VENOUS BLD VENIPUNCTURE: CPT | Performed by: HOSPITALIST

## 2020-12-19 PROCEDURE — 250N000013 HC RX MED GY IP 250 OP 250 PS 637: Performed by: HOSPITALIST

## 2020-12-19 PROCEDURE — 84295 ASSAY OF SERUM SODIUM: CPT | Performed by: HOSPITALIST

## 2020-12-19 RX ORDER — ASCORBIC ACID/MULTIVIT-MIN 1000 MG
1 EFFERVESCENT POWDER IN PACKET ORAL DAILY
Status: DISCONTINUED | OUTPATIENT
Start: 2020-12-19 | End: 2020-12-19 | Stop reason: CLARIF

## 2020-12-19 RX ORDER — BACITRACIN ZINC 500 [USP'U]/G
OINTMENT TOPICAL DAILY PRN
Status: DISCONTINUED | OUTPATIENT
Start: 2020-12-19 | End: 2020-12-20 | Stop reason: HOSPADM

## 2020-12-19 RX ORDER — MULTIVIT WITH MINERALS/LUTEIN
3000 TABLET ORAL DAILY
COMMUNITY

## 2020-12-19 RX ORDER — CARBAMAZEPINE 100 MG/5ML
150 SUSPENSION ORAL 3 TIMES DAILY
Status: DISCONTINUED | OUTPATIENT
Start: 2020-12-19 | End: 2020-12-19

## 2020-12-19 RX ORDER — CARBAMAZEPINE 100 MG/5ML
100 SUSPENSION ORAL ONCE
Status: DISCONTINUED | OUTPATIENT
Start: 2020-12-19 | End: 2020-12-19

## 2020-12-19 RX ORDER — LEVOTHYROXINE SODIUM 75 UG/1
150 TABLET ORAL EVERY MORNING
Status: DISCONTINUED | OUTPATIENT
Start: 2020-12-20 | End: 2020-12-20 | Stop reason: HOSPADM

## 2020-12-19 RX ORDER — METOCLOPRAMIDE HYDROCHLORIDE 5 MG/5ML
10 SOLUTION ORAL
Status: DISCONTINUED | OUTPATIENT
Start: 2020-12-19 | End: 2020-12-20 | Stop reason: HOSPADM

## 2020-12-19 RX ORDER — GUAR GUM
1 PACKET (EA) ORAL DAILY
Status: DISCONTINUED | OUTPATIENT
Start: 2020-12-19 | End: 2020-12-20 | Stop reason: HOSPADM

## 2020-12-19 RX ORDER — SCOPOLAMINE HYDROBROMIDE
POWDER (GRAM) MISCELLANEOUS 3 TIMES DAILY
Status: DISCONTINUED | OUTPATIENT
Start: 2020-12-19 | End: 2020-12-19

## 2020-12-19 RX ORDER — CARBAMAZEPINE 100 MG/5ML
150 SUSPENSION ORAL 3 TIMES DAILY
Status: DISCONTINUED | OUTPATIENT
Start: 2020-12-19 | End: 2020-12-20 | Stop reason: HOSPADM

## 2020-12-19 RX ORDER — CARBAMAZEPINE 100 MG/5ML
100 SUSPENSION ORAL DAILY
Status: ON HOLD | COMMUNITY
End: 2024-09-18

## 2020-12-19 RX ORDER — HYDROCORTISONE 20 MG/1
20 TABLET ORAL EVERY MORNING
Status: DISCONTINUED | OUTPATIENT
Start: 2020-12-20 | End: 2020-12-20 | Stop reason: HOSPADM

## 2020-12-19 RX ORDER — ACETYLCYSTEINE 200 MG/ML
2 SOLUTION ORAL; RESPIRATORY (INHALATION) 4 TIMES DAILY
Status: DISCONTINUED | OUTPATIENT
Start: 2020-12-19 | End: 2020-12-20 | Stop reason: HOSPADM

## 2020-12-19 RX ORDER — CHOLECALCIFEROL (VITAMIN D3) 50 MCG
50 TABLET ORAL DAILY
Status: DISCONTINUED | OUTPATIENT
Start: 2020-12-19 | End: 2020-12-20 | Stop reason: HOSPADM

## 2020-12-19 RX ORDER — POTASSIUM CHLORIDE 1.5 G/1.58G
20 POWDER, FOR SOLUTION ORAL DAILY
Status: DISCONTINUED | OUTPATIENT
Start: 2020-12-19 | End: 2020-12-20 | Stop reason: HOSPADM

## 2020-12-19 RX ORDER — DEXTROSE MONOHYDRATE 100 MG/ML
INJECTION, SOLUTION INTRAVENOUS CONTINUOUS PRN
Status: DISCONTINUED | OUTPATIENT
Start: 2020-12-19 | End: 2020-12-20 | Stop reason: HOSPADM

## 2020-12-19 RX ORDER — PROCHLORPERAZINE 25 MG
25 SUPPOSITORY, RECTAL RECTAL EVERY 12 HOURS PRN
Status: DISCONTINUED | OUTPATIENT
Start: 2020-12-19 | End: 2020-12-20 | Stop reason: HOSPADM

## 2020-12-19 RX ORDER — HYDROCORTISONE 10 MG/1
10 TABLET ORAL
Status: DISCONTINUED | OUTPATIENT
Start: 2020-12-19 | End: 2020-12-20 | Stop reason: HOSPADM

## 2020-12-19 RX ORDER — CALCIUM CARBONATE 1250 MG/5ML
1250 SUSPENSION ORAL 3 TIMES DAILY
Status: DISCONTINUED | OUTPATIENT
Start: 2020-12-19 | End: 2020-12-20 | Stop reason: HOSPADM

## 2020-12-19 RX ORDER — CARBAMAZEPINE 100 MG/5ML
100 SUSPENSION ORAL EVERY 24 HOURS
Status: DISCONTINUED | OUTPATIENT
Start: 2020-12-19 | End: 2020-12-20 | Stop reason: HOSPADM

## 2020-12-19 RX ORDER — SODIUM BICARBONATE 650 MG/1
650 TABLET ORAL DAILY
Status: DISCONTINUED | OUTPATIENT
Start: 2020-12-19 | End: 2020-12-20 | Stop reason: HOSPADM

## 2020-12-19 RX ORDER — CARBAMAZEPINE 100 MG/5ML
100 SUSPENSION ORAL DAILY
Status: DISCONTINUED | OUTPATIENT
Start: 2020-12-19 | End: 2020-12-19

## 2020-12-19 RX ORDER — ASCORBIC ACID 500 MG
1000 TABLET ORAL DAILY
Status: DISCONTINUED | OUTPATIENT
Start: 2020-12-19 | End: 2020-12-20 | Stop reason: HOSPADM

## 2020-12-19 RX ORDER — ALBUTEROL SULFATE 0.83 MG/ML
2.5 SOLUTION RESPIRATORY (INHALATION)
Status: DISCONTINUED | OUTPATIENT
Start: 2020-12-19 | End: 2020-12-20 | Stop reason: HOSPADM

## 2020-12-19 RX ORDER — ASPIRIN 81 MG/1
81 TABLET, CHEWABLE ORAL DAILY
Status: DISCONTINUED | OUTPATIENT
Start: 2020-12-19 | End: 2020-12-20 | Stop reason: HOSPADM

## 2020-12-19 RX ADMIN — SODIUM BICARBONATE 650 MG TABLET 650 MG: at 15:24

## 2020-12-19 RX ADMIN — METOCLOPRAMIDE HYDROCHLORIDE 10 MG: 5 SOLUTION ORAL at 15:30

## 2020-12-19 RX ADMIN — PANTOPRAZOLE SODIUM 40 MG: 40 TABLET, DELAYED RELEASE ORAL at 16:28

## 2020-12-19 RX ADMIN — HYDROCORTISONE 10 MG: 10 TABLET ORAL at 16:27

## 2020-12-19 RX ADMIN — CARBAMAZEPINE 100 MG: 100 SUSPENSION ORAL at 17:36

## 2020-12-19 RX ADMIN — BRIVARACETAM 100 MG: 10 SOLUTION ORAL at 21:56

## 2020-12-19 RX ADMIN — CALCIUM CARBONATE 1250 MG: 1250 SUSPENSION ORAL at 20:13

## 2020-12-19 RX ADMIN — Medication 1 PACKET: at 17:37

## 2020-12-19 RX ADMIN — ALBUTEROL SULFATE 2.5 MG: 2.5 SOLUTION RESPIRATORY (INHALATION) at 20:47

## 2020-12-19 RX ADMIN — OXYCODONE HYDROCHLORIDE AND ACETAMINOPHEN 1000 MG: 500 TABLET ORAL at 15:23

## 2020-12-19 RX ADMIN — ACETYLCYSTEINE 2 ML: 200 SOLUTION ORAL; RESPIRATORY (INHALATION) at 20:47

## 2020-12-19 RX ADMIN — MINERAL SUPPLEMENT IRON 300 MG / 5 ML STRENGTH LIQUID 100 PER BOX UNFLAVORED 220 MG: at 16:27

## 2020-12-19 RX ADMIN — Medication 50 MCG: at 15:24

## 2020-12-19 RX ADMIN — CARBAMAZEPINE 150 MG: 100 SUSPENSION ORAL at 13:39

## 2020-12-19 RX ADMIN — BRIVARACETAM 100 MG: 10 SOLUTION ORAL at 16:28

## 2020-12-19 RX ADMIN — BRIVARACETAM 100 MG: 10 SOLUTION ORAL at 13:39

## 2020-12-19 RX ADMIN — POTASSIUM & SODIUM PHOSPHATES POWDER PACK 280-160-250 MG 1 PACKET: 280-160-250 PACK at 15:25

## 2020-12-19 RX ADMIN — POTASSIUM & SODIUM PHOSPHATES POWDER PACK 280-160-250 MG 1 PACKET: 280-160-250 PACK at 20:13

## 2020-12-19 RX ADMIN — ASPIRIN 81 MG: 81 TABLET, CHEWABLE ORAL at 15:24

## 2020-12-19 RX ADMIN — POTASSIUM CHLORIDE 20 MEQ: 1.5 POWDER, FOR SOLUTION ORAL at 01:19

## 2020-12-19 RX ADMIN — CALCIUM CARBONATE 1250 MG: 1250 SUSPENSION ORAL at 16:28

## 2020-12-19 RX ADMIN — METOCLOPRAMIDE HYDROCHLORIDE 10 MG: 5 SOLUTION ORAL at 21:56

## 2020-12-19 RX ADMIN — POTASSIUM CHLORIDE 20 MEQ: 1.5 POWDER, FOR SOLUTION ORAL at 15:25

## 2020-12-19 NOTE — ED NOTES
Pt's catheter bag emptied. Pt incontinent of stool. Pt cleaned up. Redness on coccyx but no open areas. Dr. Meier at bedside and noted.

## 2020-12-19 NOTE — CONSULTS
Care Management Initial Consult    General Information  Assessment completed with:  Chart review & mom (guardian Savannah)  Primary Care Provider verified and updated as needed:   Yes, Dr. Gomez [facility] [phone]  Readmission within the last 30 days:        Reason for Consult: discharge planning  Advance Care Planning: Advance Care Planning Reviewed: present on chart  Guardianship documents active and scanned in to EPIC      Communication Assessment  Patient's communication style: spoken language (English or Bilingual)    Hearing Difficulty or Deaf: yes   Wear Glasses or Blind: no    Cognitive  Cognitive/Neuro/Behavioral: .WDL except  Level of Consciousness: alert  Arousal Level: opens eyes spontaneously  Orientation: disoriented to, situation, time  Mood/Behavior: calm, cooperative  Best Language: 2 - Severe aphasia  Speech: incoherent    Living Environment:   People in home: parent(s), other (see comments)(home care agency / PCA )  Savannah (mother)  Current living Arrangements: house      Able to return to prior arrangements:  yes     Family/Social Support:  Care provided by: homecare agency, parent(s)  Provides care for:  N/A  Marital Status: Single  Guardian, PCA, Parent(s)          Description of Support System:      Support Assessment: Other (see comments)(per notes [facility] report filed 10/2020 2* many hospitalizations )  *Per Note 10/25/2020   [facility] Felisa Guzman ([phone]) regarding VA report submitted by CHE on 10/27. CHE attempted to return call and left VM to the [facility]. Per chart review, CHE filed VA report [id number] out of concern that even with extensive in-home services, patient continues to have repeated hospitalizations.     [facility] receiving 12 hours of nursing services and a live in PCA with providers that have been working with Pt for an extended period. Felisa mentioned that documentation shows that Pt is getting quality care at home  "and has declined medically this year.     Felisa mentioned to let Pt legal Guardian (mother) know that living in a facility would be the best option of Pt due to repeated hospitalizations despite extensive CADI waiver services. Felisa reported that the guardian should know that the SW is a mandated  and must report based on repeated hospitalizations. SW will continue to provide psychosocial support, resources and advocate on behalf of Pt.       Current Resources:   Skilled Home Care Services: Skilled Nursing  Community Resources: County Programs, County Worker, Housekeeping/Chore Agency, PCA  Equipment currently used at home: wheelchair, power, hospital bed, lift device  Supplies currently used at home: Diabetic Supplies, Incontinence Supplies, Enteral Nutrition & Supplies  Agency information   [facility] (phone [phone], fax [phone])  12 hours RN coverage daily   [facility] (per note \"currently 80.5 hours every 7 days\")   12 hours PCA evening / night   [facility] (phone [phone])  Also supply nursing   [facility]   For TF supplies  Legal Guardian  Mother, Savannah  Transportation   Stretcher rides via [employer] (phone [phone], fax [phone])  ramp available at home address [address] [facility] [address]     Employment/Financial:  Employment Status: disabled     Employment/ Comments: [employer] (disabled since )  Financial Concerns: No concerns identified   Finance Comments: active MEDICARE & MEDICAID,  in-home nursing 12 hrs/day via CADI waiver and 12 hours of PCA services, homemaking services     Lifestyle & Psychosocial Needs:  Socioeconomic History     Marital status: Single     Spouse name: Not on file     Number of children: Not on file     Years of education: Not on file     Highest education level: Not on file     Tobacco Use     Smoking status: Former Smoker     Quit date: 1989     Years since quittin.6     Smokeless tobacco: " "Never Used   Substance and Sexual Activity     Alcohol use: No     Drug use: No     Sexual activity: Never     Functional Status:  Prior to admission patient needed assistance:   Dependent ADLs:: Bathing, Dressing, Eating, Grooming  Dependent IADLs:: Cleaning, Cooking, Laundry, Shopping, Meal Preparation, Medication Management, Money Management, Transportation  Assesssment of Functional Status: At functional baseline    Mental Health Status:  Mental Health Status: No Current Concerns       Chemical Dependency Status:  Chemical Dependency Status: No Current Concerns       Values/Beliefs:  Spiritual, Cultural Beliefs, Yazidi Practices, Values that affect care:            Values/Beliefs Comment: Gnosticism    Additional Information:  Called mom/guardian (Savannah [phone]) to provide KAY. She confirmed the above and added an additional home nurse agency, [facility] (see contact info above).    She asks about his sodium and offers, \"I think we were giving him too much water\"   She mentions pt's new endocrinologist, Dr. Faizan Velasquez, \"seems very competent.\"  She states, \"I would really like to get him back to eating again but it's really hard to get anyone to work with me on that.  I don't want to do something if it's not safe.\"      Brooke Ivory RN, BSN, PHN  [facility]  Inpatient Care Management  Direct Mobile: [phone]      "

## 2020-12-19 NOTE — CONSULTS
CLINICAL NUTRITION SERVICES  -  ASSESSMENT NOTE      RECOMMENDATIONS FOR MD/PROVIDER TO ORDER:   - MD to address free water flushes at discharge for sodium correction. Was receiving 120 mL 6-8x/day at home (between 720-1000 mL additional free water)    Recommendations Ordered by Registered Dietitian (RD):   - Resume TF --> Isosource 1.5 (subsitute for Jevity 1.5) at 55 mL/hr to provide 1980 kcal (27 kcal/kg), 90 gm protein (1.2 gm/kg), 20 gm fiber, 232 gm CHO and 1000 mL free water   - Free water 60 mL q 4 hrs for patency d/t low Na     Future recs:   - If staying here at least 1 more day, RD will order additional fiber pkts as needed   Malnutrition:   % Weight Loss:  Weight loss does not meet criteria for malnutrition   % Intake:  No decreased intake noted (TF reliant w/ occasional  interruptions)   Subcutaneous Fat Loss: (10/26) None noted-  No wasting per say, pt is simply thin  Muscle Loss: (10/26) None observed -  No wasting per say, pt is simply thin d/t chronic medical issues  Fluid Retention:  None noted    Malnutrition Diagnosis: Patient does not meet two of the above criteria necessary for diagnosing malnutrition        REASON FOR ASSESSMENT  Keyon Farias is a 60 year old male seen by Registered Dietitian for Provider Order - Registered Dietitian to Assess and Order TF per Medical Nutrition Therapy Protocol      NUTRITION HISTORY  - Information obtained from chart review and pt's mother, Savannah (via phone call)   - Pt known to nutrition services from multiple admissions over the past 1+ year  - He is on chronic TF and NPO for h/o severe dysphagia and recurrent aspiration PNA  - Most recently receiving Jevity 1.5 at 55 mL/hr (5.5 cans) x 24 hrs + 1 pkt Fiber/day providing 1967 kcal, 83 gm protein and 990 mL free water. Phyills reports that pt always gets a least 5 cans formula but not always the full 5.5 cans d/t clinic visits and lab draws  - Savannah states that the 24 hr cycle has helped reduce the  occurrence of aspiration and she has had no recent concerns or questions re the feeding plan  - Of note, sodium levels has been running low over the past 1-2 weeks so Savannah has been decreasing the fluid flushes to provide ~1L free water/day (in 120 mL/hr increments with meds)   - She wishes she could get patient back to eating for comfort   - Wts have been fairly stable ~160 lbs   - Last received TF yesterday p/t hospital admission       CURRENT NUTRITION ORDERS  Diet Order:     NPO    Current Intake/Tolerance:  None yet  Consulted to resume home TF today       NUTRITION FOCUSED PHYSICAL ASSESSMENT FOR DIAGNOSING MALNUTRITION)  No:  COVID r/o          Observed:     Deferred  Per previous RD notes:  Subcutaneous Fat Loss: None noted-  No wasting per say, pt is simply thin  Muscle Loss: None observed -  No wasting per say, pt is simply thin d/t chronic medical issues    Obtained from Chart/Interdisciplinary Team:  No edema   Red blanchable coccyx   Last BM 12/19     ANTHROPOMETRICS  Height: 6'   Weight: 159 lbs 8 oz  Body mass index is 21.63 kg/m .  Weight Status:  Normal BMI  IBW: 80.9 kg   % IBW: 89%  Weight History:   Wt Readings from Last 10 Encounters:   12/18/20 72.3 kg (159 lb 8 oz)   11/04/20 71 kg (156 lb 8.4 oz)   10/25/20 68.9 kg (151 lb 12.8 oz)   10/23/20 72.6 kg (160 lb)   10/14/20 73.5 kg (162 lb)   09/25/20 73.7 kg (162 lb 6.4 oz)   08/22/20 69.9 kg (154 lb 3.2 oz)   08/15/20 74.2 kg (163 lb 9.3 oz)   05/17/20 67.2 kg (148 lb 2.4 oz)   05/06/20 74.2 kg (163 lb 8 oz)       LABS  Labs reviewed  Na 132 (L)  K 3.6 (NL)  No Mg/Phos   Recent Labs   Lab 12/19/20  0430 12/18/20  1656 12/17/20  1242   GLC 95 87 151*       MEDICATIONS  Medications reviewed      ASSESSED NUTRITION NEEDS PER APPROVED PRACTICE GUIDELINES:    Dosing Weight 72.3 kg   Estimated Energy Needs: 3698-5687 kcals (20-25+ Kcal/Kg)  Justification: maintenance and WC bound   Estimated Protein Needs:  grams protein (1.2-1.5 g  pro/Kg)  Justification: preservation of lean body mass  Estimated Fluid Needs: 1 mL/kcal or per MD      MALNUTRITION:  % Weight Loss:  Weight loss does not meet criteria for malnutrition   % Intake:  No decreased intake noted (TF reliant w/ occasional  interruptions)   Subcutaneous Fat Loss: (10/26) None noted-  No wasting per say, pt is simply thin  Muscle Loss: (10/26) None observed -  No wasting per say, pt is simply thin d/t chronic medical issues  Fluid Retention:  None noted    Malnutrition Diagnosis: Patient does not meet two of the above criteria necessary for diagnosing malnutrition    NUTRITION DIAGNOSIS:  Inadequate protein-energy intake related to NPO and TF reliant at baseline as evidenced by TF off w/ hospital txr, receiving 0% estimated needs       NUTRITION INTERVENTIONS  Recommendations / Nutrition Prescription  Resume home TF with comparable formula as above   Flushes 60 mL q 4 hrs for patency and low Na       Implementation  Nutrition education: Per Provider order if indicated and Not appropriate at this time due to patient condition  EN Composition, EN Schedule, Feeding Tube Flush: as above   Collaboration and Referral of Nutrition care: Briefly discussed with RN       Nutrition Goals  EN to meet % estimated needs      MONITORING AND EVALUATION:  Progress towards goals will be monitored and evaluated per protocol and Practice Guidelines        Yanely Jeffreson RD, LD  Clinical Dietitian

## 2020-12-19 NOTE — PROGRESS NOTES
RECEIVING UNIT ED HANDOFF REVIEW    ED Nurse Handoff Report was reviewed by: Emy Last RN on December 18, 2020 at 8:24 PM
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
## 2020-12-19 NOTE — PROGRESS NOTES
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 Pt was admitted to [facility] on 12/18/2020 after presenting with an episode of altered consciousness.  The following problems were addressed during his hospitalization:    Assessment & Plan:     Episode of altered consciousness  Hx of seizure  Etiology unclear, mother reporting no activity similar to prior seizures, but did report an episode the AM of 12/16 reminiscent of prior seizures.  Carbamazepine level elevated, which is unchanged from prior.  Has mild hyponatremia and hypokalemia which have been stable since 12/8, so unlikely this is culprit.  Unable to perform formal neuro exam but no gross deficits, currently following commands, is awake and alert and responding to questions, giving the \"thumbs up\" sign.  He is afebrile without leukocytosis, though lactate mildly elevated at 2.3.  CXR shows low lung volumes but no clear infiltrate and he is not hypoxic with clear lungs on exam.  EKG is unremarkable. LFTs WNL and VBG (has elevated bicarb but stable and appears to be on sodium bicarb outpatient).  mother contacted his outpatient neurologist, who contacted the ED and requested EEG. Procal negative, lactic acid 1.8 today, restricting his water to 1L daily due to hyponatremia. UA unremarkable  - Neurology consult pending, EEG ordered  - Continue PTA brivaracetam and carbamazepine  - will hold on any head imaging as currently appears at baseline     Hyponatremia  Hypokalemia  Admission Na 132, K 3.0.  Mother reports outpatient endocrinologist recommends fluid restriction of 1L daily.   - K protocol     TBI with spastic " "hemiplegia, dysphagia s/p PEG tube  - nutrition consult for tube feeding  - continue PTA Reglan, neutraphos, potassium once verified     Panhypopituitarism  - continue PTA hydrocortisone, levothyroxine once verified     GERD  - continue PTA pantoprazole once verified     Hx recurrent aspiration pneumonia  - continue PTA Mucomyst and albuterol nebs qid once verified     Hx coccygeal pressure ulcer  Currently coccygeal region is erythematous, but appears more consistent with candida than pressure injury.       DVT Prophylaxis: Pneumatic Compression Devices  Code Status: Full Code  Disposition: Expected discharge in 0-1 days once neurology work-up complete.    The patient's care was discussed with the Attending Physician, Dr. Maldonado, Bedside Nurse, Care Coordinator/, and Patient.    Isabelle PRICE-MATTHEW      Interval History   Unable to perform formal neuro exam but no gross deficits, currently following commands, is awake and alert and responding to questions, giving the \"thumbs up\" sign. Nods yes and no appropriately to questions    -Data reviewed today: I reviewed all new labs and imaging results over the last 24 hours. I personally reviewed no images or EKG's today.    Physical Exam   Temp: 96.9  F (36.1  C) Temp src: Axillary BP: 105/63 Pulse: 100   Resp: 16 SpO2: 91 % O2 Device: None (Room air)    Vitals:    12/18/20 2058   Weight: 72.3 kg (159 lb 8 oz)     Vital Signs with Ranges  Temp:  [96  F (35.6  C)-97.9  F (36.6  C)] 96.9  F (36.1  C)  Pulse:  [] 100  Resp:  [16-20] 16  BP: (104-124)/(53-75) 105/63  SpO2:  [91 %-97 %] 91 %  I/O last 3 completed shifts:  In: 150 [NG/GT:150]  Out: 200 [Urine:200]    General Appearance: well nourished male in NAD  Eyes: PERRL, sclera anicteric  HEENT: mucous membranes moist, no neck LAD  Respiratory: lungs CTAB, no wheezes or crackles, no tachypnea  Cardiovascular: RRR, normal s1/s2 without murmur  GI: abdomen soft, normal bowel sounds, nondistended, no " signs of tenderness  Lymph/Hematologic:  No peripheral edema  Genitourinary: condom catheter in place  Skin: erythematous patch overlying cocygeal area  Musculoskeletal: atrophic extremities, warm and well perfused  Neurologic: awake and alert, following commands, no gross cranial nerve deficits  Psychiatric: unable to assess       Medications     dextrose         acetylcysteine  2 mL Nebulization 4x Daily     albuterol  2.5 mg Nebulization Q4H While awake     aspirin  81 mg Oral or Feeding Tube Daily     Brivaracetam  100 mg Oral BID     Brivaracetam  100 mg Oral Once     calcium carbonate  1,250 mg Oral TID     carBAMazepine  100 mg Oral Q24H     carBAMazepine  150 mg Oral TID     ferrous sulfate  220 mg Oral Daily     fiber modular (NUTRISOURCE FIBER)  1 packet Per G Tube Daily     hydrocortisone  10 mg Per Feeding Tube Daily with supper     [START ON 12/20/2020] hydrocortisone  20 mg Per Feeding Tube QAM     [START ON 12/20/2020] levothyroxine  150 mcg Oral QAM     metoclopramide  10 mg Oral 4x Daily AC & HS     pantoprazole  40 mg Per J Tube Daily     potassium & sodium phosphates  1 packet Oral TID     potassium chloride  20 mEq Oral or Feeding Tube Daily     sodium bicarbonate  650 mg Oral Daily     sodium chloride (PF)  3 mL Intracatheter Q8H     vitamin C  1,000 mg Oral Daily     vitamin D3  50 mcg Oral Daily       Data   Recent Labs   Lab 12/19/20  0430 12/18/20  1656 12/17/20  1242   WBC 9.1 9.6  --    HGB 12.3* 13.3  --    MCV 88 87  --     213  --    * 132* 130*   POTASSIUM 3.6 3.0* 3.1*   CHLORIDE 97 89* 90*   CO2 31 38* 37*   BUN 18 19 21   CR 0.80 0.86 0.79   ANIONGAP 4 5 3   STEVE 8.6 9.2 9.0   GLC 95 87 151*   ALBUMIN 3.1*  --   --    PROTTOTAL 7.7  --   --    BILITOTAL 0.4  --   --    ALKPHOS 97  --   --    ALT 25  --   --    AST 22  --   --        Recent Results (from the past 24 hour(s))   XR Chest Port 1 View    Narrative    XR CHEST PORT 1 VW 12/18/2020 5:37 PM    HISTORY:  cough    COMPARISON: 11/1/2020      Impression    IMPRESSION: Low lung volumes. This exaggerates bilateral  atelectasis/consolidation. Elevated right hemidiaphragm. The  appearance is similar to 11/1/2020. No pleural effusion or  pneumothorax. The cardiac size is normal. Stable narrowing of the  trachea at the thoracic inlet.    JULIÁN MURO MD

## 2020-12-19 NOTE — PLAN OF CARE
Pt is COVID R/O. Admitted for possible seizure like activity at home. Sodium and potassium levels have been low. Altered mental status. A&Ox1-2. He is a total assist for cares. He has a G-tube that was flushed and patent, dressing was changed this shift and is CDI. Condom cath is applied and attached to a bed bag with yellow, clear urine collected. Incont of B&B. Olamide able redness on coccyx, mepilex applied. Bolus of sod chlorid at 125/hr running and 40meq potassium given to correct potassium level at 3.0. Carbamazepine level elevated, Plan for EEG. CXR shows low lung volumes but no clear infiltrate and he is not hypoxic with clear lungs on exam.  EKG is unremarkable. May discharge tomorrow to home pending labs and further testing.
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
## 2020-12-19 NOTE — PROGRESS NOTES
Savannah (mother) updated on pt's care this shift. She was grateful for the information and is hoping for discharge tomorrow for pt.

## 2020-12-19 NOTE — PLAN OF CARE
COVID r/o. A&Ox2. VSS on RA. Total assist of cares. G-tube flushes and patent, dressing CDI. Condom catheter in place. Incontinent of stool x 3 during shift. Blanchable redness on coccyx, mepilex in place. Turn and repo every 2 hours. Discharge pending. Continue to monitor.

## 2020-12-19 NOTE — PHARMACY-ADMISSION MEDICATION HISTORY
Pharmacy Medication History  Admission medication history interview status for the 12/18/2020  admission is complete. See EPIC admission navigator for prior to admission medications       Medication history sources: Patient's family/friend (Mother) and Surescripts  Location of interview: Phone  Adherence Assessment: Good    Significant changes made to the medication list:  Modified: carbamazepine is taken 4 times daily - dose at 0600, 1200, and 2400 is 150mg, dose at 1800 is 100mg  Modified: testosterone injections to once every two weeks on Friday      Additional medication history information:   Patient last received doses at home at 1500/1600 yesterday 12/18/20.  Scopolamine is a compounded product.  Patient not currently taking: prochlorperazine per mother  Patient's mother verified patient is taking K/Na/Phos packet - was not able to verify if patient is taking both K/Na/Phos packet and K/Cl packet.   Patient's mother stated patient is not currently taking vitamin C packets - using vitamin C tablets instead.    Medication reconciliation completed by provider prior to medication history? No    Time spent in this activity: 35 minutes      Prior to Admission medications    Medication Sig Last Dose Taking? Auth Provider   acetylcysteine (MUCOMYST) 20 % neb solution Take 2 mLs by nebulization 4 times daily With albuterol at 0700, 1100, 1500, and 1900  12/18/2020 at 1500 Yes Unknown, Entered By History   albuterol (PROVENTIL) (5 MG/ML) 0.5% neb solution Take 2.5 mg by nebulization every 4 hours (while awake) 0700 1100 1500 1900 with mucomyst  12/18/2020 at 1500 Yes Unknown, Entered By History   aspirin (ASA) 81 MG chewable tablet 81 mg by Oral or Feeding Tube route daily At 0900 12/18/2020 at Unknown time Yes Unknown, Entered By History   bacitracin ointment Apply topically daily as needed for wound care To PEG site.  prn at prn Yes Unknown, Entered By History   Brivaracetam (BRIVIACT) 10 MG/ML solution 100 mg by  Oral or Feeding Tube route 2 times daily 0900, 2100 12/18/2020 at 0900 Yes Unknown, Entered By History   calcium carbonate 1250 MG/5ML SUSP suspension Take 1,250 mg by mouth 3 times daily 0900, 1500, 2100 12/18/2020 at 1500 Yes Unknown, Entered By History   carBAMazepine (TEGRETOL) 100 MG/5ML suspension Take 100 mg by mouth daily Take at 1800  12/17/2020 at 1600 Yes Unknown, Entered By History   carBAMazepine (TEGRETOL) 100 MG/5ML suspension 150 mg by Oral or Feeding Tube route 3 times daily At 06:00, 12:00, and 24:00 for seizures 12/18/2020 at 1200 Yes Unknown, Entered By History   clotrimazole-betamethasone (LOTRISONE) 1-0.05 % external cream Apply topically 2 times daily as needed  prn at prn Yes Leticia Santiago MD   ferrous sulfate 220 (44 Fe) MG/5ML ELIX 220 mg by Per Feeding Tube route daily @ 0900 12/18/2020 at Unknown time Yes Unknown, Entered By History   Guar Gum (FIBER MODULAR, NUTRISOURCE FIBER,) packet 1 packet by Per G Tube route daily 12/18/2020 at Unknown time Yes Starr Champion MD   hydrocortisone (CORTEF) 10 MG tablet 10 mg by Oral or FT or NG tube route daily (with dinner) At 1500 12/18/2020 at 1500 Yes Unknown, Entered By History   hydrocortisone (CORTEF) 10 MG tablet 20 mg by Oral or FT or NG tube route every morning At 0900 12/18/2020 at 0900 Yes Unknown, Entered By History   hydrocortisone 1 % CREA cream Place rectally 2 times daily as needed for other Apply to reddened memo areas as needed prn at prn Yes Unknown, Entered By History   levothyroxine (SYNTHROID/LEVOTHROID) 150 MCG tablet Take 150 mcg by mouth every morning At 0500 12/18/2020 at 0500 Yes Unknown, Entered By History   melatonin (MELATONIN) 1 MG/ML LIQD liquid 6 mg by Per NG tube route At Bedtime  12/17/2020 at Unknown time Yes Unknown, Entered By History   metoclopramide (REGLAN) 10 MG/10ML SOLN solution Take 10 mg by mouth 4 times daily (before meals and nightly) 0800, 1200, 1600, 2000  Disconnects bag before administration,  then waits 45 mins before reconnecting after giving the medication 12/18/2020 at 1200 Yes Unknown, Entered By History   miconazole (MICATIN) 2 % AERP powder Apply topically 2 times daily as needed  prn at prn Yes Unknown, Entered By History   mupirocin (BACTROBAN) 2 % external ointment Apply topically 2 times daily as needed  prn at prn Yes Reported, Patient   pantoprazole (PROTONIX) 2 mg/mL SUSP suspension 20 mLs (40 mg) by Per J Tube route daily 12/18/2020 at Unknown time Yes Alvaro Barahona MD   potassium & sodium phosphates (NEUTRA-PHOS) 280-160-250 MG Packet Take 1 packet by mouth 3 times daily  12/18/2020 at Unknown time Yes Yanely Liriano MD   Scopolamine HBr POWD Dispense #90. Mix contents with small amount of water for admin via J-tube.  Administer 0.8 mg three times each day. 12/18/2020 at Unknown time Yes Jennie Bermudez MD   sodium bicarbonate 650 MG tablet Take 1 tablet (650 mg) by mouth daily 12/18/2020 at Unknown time Yes Ricky Torres MD   testosterone cypionate (DEPOTESTOTERONE CYPIONATE) 200 MG/ML injection Inject 76 mg into the muscle See Admin Instructions Every 2 weeks on Fridays  76 mg or 0.38 mL 12/18/2020 at Unknown time Yes Unknown, Entered By History   vitamin C (ASCORBIC ACID) 1000 MG TABS Take 1,000 mg by mouth daily 12/18/2020 at Unknown time Yes Unknown, Entered By History   vitamin D3 (CHOLECALCIFEROL) 2000 units (50 mcg) tablet Take 2,000 Units by mouth daily Crush and feed via j-tube @@ 0900 12/18/2020 at Unknown time Yes Unknown, Entered By History   Multiple Vitamins-Minerals (EMERGEN-C VITAMIN C) PACK 1 packet by Oral or Feeding Tube route daily Vitamin C 1000 mg   Unknown, Entered By History   potassium chloride (KLOR-CON) 20 MEQ packet 20 mEq by Oral or Feeding Tube route daily   Faizan Mclean MD   prochlorperazine (COMPAZINE) 25 MG suppository Place 1 suppository (25 mg) rectally every 12 hours as needed for nausea or vomiting   Alvaro Barahona MD   Skin  Protectants, Misc. (BALMEX SKIN PROTECTANT) OINT Externally apply topically 2 times daily as needed (irritation) Applay to reddened meom areas twice daily as needed   Unknown, Entered By History       The information provided in this note is only as accurate as the sources available at the time of the update(s).

## 2020-12-19 NOTE — PROGRESS NOTES
MD Notification    Notified Person: MD    Notified Person Name:Dr. Garcia    Notification Date/Time:12/19/2020 0545    Notification Interaction: page    Purpose of Notification:Pt's labs came back with some abnormal values. Na, Albumin and blood gasses.    Orders Received:Dr. Garcia reviewed and continue to monitor    Comments:

## 2020-12-19 NOTE — ED NOTES
[facility]  ED Nurse Handoff Report    ED Chief complaint: K+ check and Altered Mental Status      ED Diagnosis:   Final diagnoses:   Transient alteration of awareness   History of seizure       Code Status: Full Code    Allergies:   Allergies   Allergen Reactions     Valproic Acid Other (See Comments)     Toxicity w/ bone marrow suspension, elevated ammonia levels      Dilantin [Phenytoin Sodium] Other (See Comments)     Severe Trembling     Scopolamine Hives     Hives with the patch - oral no problem       Patient Story: Pt presents from home after his home health nurse noted him to space out during his neb treatment. Pt's mother reports that his sodium had been off lately and wanted him sent in for evaluation.  Focused Assessment:  Pt is nonverbal at baseline. Mother had contacted his neurologist and the neurologist wants him to be admitted for an EEG during admission. Pt is a frequent ER pt and seems at his baseline.    Treatments and/or interventions provided: none  Patient's response to treatments and/or interventions: pt seems at baseline    To be done/followed up on inpatient unit:  EEG    Does this patient have any cognitive concerns?: TBI-nonverbal    Activity level - Baseline/Home:  Total Care  Activity Level - Current:   Total Care    Patient's Preferred language: English   Needed?: No    Isolation: contact for MRSA and VRE  Infection: Not Applicable  MRSA  Patient tested for COVID 19 prior to admission: YES  Bariatric?: No    Vital Signs:   Vitals:    12/18/20 1606 12/18/20 1907   BP: 103/73 104/75   Pulse: 105 94   Resp: 18    Temp: 98.5  F (36.9  C)    TempSrc: Temporal    SpO2: 95% 92%       Cardiac Rhythm:     Was the PSS-3 completed:   No -nonverbal  What interventions are required if any?               Family Comments: Mother is aware of admission plan.  OBS brochure/video discussed/provided to patient/family: N/A              Name of person given brochure if not  patient: N/A              Relationship to patient: N/A    For the majority of the shift this patient's behavior was Yellow.   Behavioral interventions performed were information and reassurance.    ED NURSE PHONE NUMBER: [phone]

## 2020-12-20 VITALS
BODY MASS INDEX: 23.06 KG/M2 | SYSTOLIC BLOOD PRESSURE: 116 MMHG | RESPIRATION RATE: 18 BRPM | HEART RATE: 99 BPM | DIASTOLIC BLOOD PRESSURE: 71 MMHG | TEMPERATURE: 96.4 F | OXYGEN SATURATION: 94 % | WEIGHT: 170.1 LBS

## 2020-12-20 LAB
AMMONIA PLAS-SCNC: 45 UMOL/L (ref 10–50)
GLUCOSE BLDC GLUCOMTR-MCNC: 106 MG/DL (ref 70–99)
SODIUM SERPL-SCNC: 135 MMOL/L (ref 133–144)

## 2020-12-20 PROCEDURE — 999N001017 HC STATISTIC GLUCOSE BY METER IP

## 2020-12-20 PROCEDURE — 99207 PR MOONLIGHTING INDICATOR: CPT | Performed by: PHYSICIAN ASSISTANT

## 2020-12-20 PROCEDURE — 250N000013 HC RX MED GY IP 250 OP 250 PS 637: Mod: GY | Performed by: HOSPITALIST

## 2020-12-20 PROCEDURE — 84295 ASSAY OF SERUM SODIUM: CPT | Performed by: HOSPITALIST

## 2020-12-20 PROCEDURE — 99217 PR OBSERVATION CARE DISCHARGE: CPT | Performed by: PHYSICIAN ASSISTANT

## 2020-12-20 PROCEDURE — 36415 COLL VENOUS BLD VENIPUNCTURE: CPT | Performed by: HOSPITALIST

## 2020-12-20 PROCEDURE — 250N000009 HC RX 250: Performed by: PHYSICIAN ASSISTANT

## 2020-12-20 PROCEDURE — 94640 AIRWAY INHALATION TREATMENT: CPT | Mod: 76

## 2020-12-20 PROCEDURE — 94640 AIRWAY INHALATION TREATMENT: CPT

## 2020-12-20 PROCEDURE — G0378 HOSPITAL OBSERVATION PER HR: HCPCS

## 2020-12-20 PROCEDURE — 250N000013 HC RX MED GY IP 250 OP 250 PS 637: Mod: GY | Performed by: PHYSICIAN ASSISTANT

## 2020-12-20 PROCEDURE — 82140 ASSAY OF AMMONIA: CPT | Performed by: PSYCHIATRY & NEUROLOGY

## 2020-12-20 PROCEDURE — 250N000013 HC RX MED GY IP 250 OP 250 PS 637: Performed by: PSYCHIATRY & NEUROLOGY

## 2020-12-20 PROCEDURE — 999N000157 HC STATISTIC RCP TIME EA 10 MIN: Mod: 76

## 2020-12-20 PROCEDURE — 250N000013 HC RX MED GY IP 250 OP 250 PS 637: Performed by: PHYSICIAN ASSISTANT

## 2020-12-20 RX ADMIN — PANTOPRAZOLE SODIUM 40 MG: 40 TABLET, DELAYED RELEASE ORAL at 07:13

## 2020-12-20 RX ADMIN — SODIUM BICARBONATE 650 MG TABLET 650 MG: at 09:27

## 2020-12-20 RX ADMIN — MINERAL SUPPLEMENT IRON 300 MG / 5 ML STRENGTH LIQUID 100 PER BOX UNFLAVORED 220 MG: at 09:26

## 2020-12-20 RX ADMIN — POTASSIUM & SODIUM PHOSPHATES POWDER PACK 280-160-250 MG 1 PACKET: 280-160-250 PACK at 13:03

## 2020-12-20 RX ADMIN — CARBAMAZEPINE 150 MG: 100 SUSPENSION ORAL at 06:36

## 2020-12-20 RX ADMIN — CARBAMAZEPINE 150 MG: 100 SUSPENSION ORAL at 13:03

## 2020-12-20 RX ADMIN — ALBUTEROL SULFATE 2.5 MG: 2.5 SOLUTION RESPIRATORY (INHALATION) at 10:42

## 2020-12-20 RX ADMIN — METOCLOPRAMIDE HYDROCHLORIDE 10 MG: 5 SOLUTION ORAL at 16:25

## 2020-12-20 RX ADMIN — METOCLOPRAMIDE HYDROCHLORIDE 10 MG: 5 SOLUTION ORAL at 11:24

## 2020-12-20 RX ADMIN — HYDROCORTISONE 20 MG: 20 TABLET ORAL at 09:25

## 2020-12-20 RX ADMIN — LEVOTHYROXINE SODIUM 150 MCG: 75 TABLET ORAL at 09:27

## 2020-12-20 RX ADMIN — ASPIRIN 81 MG: 81 TABLET, CHEWABLE ORAL at 09:47

## 2020-12-20 RX ADMIN — ALBUTEROL SULFATE 2.5 MG: 2.5 SOLUTION RESPIRATORY (INHALATION) at 00:04

## 2020-12-20 RX ADMIN — BRIVARACETAM 100 MG: 10 SOLUTION ORAL at 09:28

## 2020-12-20 RX ADMIN — ACETYLCYSTEINE 4 ML: 200 SOLUTION ORAL; RESPIRATORY (INHALATION) at 16:03

## 2020-12-20 RX ADMIN — OXYCODONE HYDROCHLORIDE AND ACETAMINOPHEN 1000 MG: 500 TABLET ORAL at 09:26

## 2020-12-20 RX ADMIN — POTASSIUM & SODIUM PHOSPHATES POWDER PACK 280-160-250 MG 1 PACKET: 280-160-250 PACK at 09:27

## 2020-12-20 RX ADMIN — CALCIUM CARBONATE 1250 MG: 1250 SUSPENSION ORAL at 09:29

## 2020-12-20 RX ADMIN — ACETYLCYSTEINE 4 ML: 200 SOLUTION ORAL; RESPIRATORY (INHALATION) at 10:46

## 2020-12-20 RX ADMIN — Medication 1 PACKET: at 11:20

## 2020-12-20 RX ADMIN — CARBAMAZEPINE 150 MG: 100 SUSPENSION ORAL at 01:13

## 2020-12-20 RX ADMIN — POTASSIUM CHLORIDE 20 MEQ: 1.5 POWDER, FOR SOLUTION ORAL at 09:27

## 2020-12-20 RX ADMIN — Medication 50 MCG: at 09:25

## 2020-12-20 RX ADMIN — ALBUTEROL SULFATE 2.5 MG: 2.5 SOLUTION RESPIRATORY (INHALATION) at 16:06

## 2020-12-20 RX ADMIN — HYDROCORTISONE 10 MG: 10 TABLET ORAL at 16:25

## 2020-12-20 RX ADMIN — CALCIUM CARBONATE 1250 MG: 1250 SUSPENSION ORAL at 13:03

## 2020-12-20 NOTE — DISCHARGE SUMMARY
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 Pt was admitted to [facility] on 12/18/2020 after presenting to the ER with an episode of altered consciousness.  The following problems were addressed during his hospitalization:    Episode of altered consciousness  Hx of seizure  Etiology unclear, mother reporting no activity similar to prior seizures, but did report an episode the AM of 12/16 reminiscent of prior seizures.  Carbamazepine level elevated, which is unchanged from prior.  Has mild hyponatremia and hypokalemia which have been stable since 12/8, so unlikely this is culprit.  Unable to perform formal neuro exam but no gross deficits, currently following commands, is awake and alert and responding to questions, giving the \"thumbs up\" sign.  He is afebrile without leukocytosis, though lactate mildly elevated at 2.3.  CXR shows low lung volumes but no clear infiltrate and he is not hypoxic with clear lungs on exam.  EKG is unremarkable. LFTs WNL and VBG (has elevated bicarb but stable and appears to be on sodium bicarb outpatient).  mother contacted his outpatient neurologist, who contacted the ED and requested EEG. Procal negative, lactic acid 1.8 today, restricting his water to 1L daily due to hyponatremia. UA unremarkable  - Neurology consult completed   --- EEG 12/19- mild encephalopathy, no seizure or epileptiform activity, No seizure or seizure like activity including staring " "during hospitalization. Ammonia 45.  ---Neurology cleared him for discharge home today and spoke with mother, who is agreeable to pt discharging home.  - Continue PTA brivaracetam and carbamazepine    Hyponatremia  Hypokalemia  Admission Na 132, K 3.0.  Mother reports outpatient endocrinologist recommends fluid restriction of 1L daily.   - K protocol while in observation  -Continue PTA fluid restriction     TBI with spastic hemiplegia, dysphagia s/p PEG tube  - nutrition consulted for tube feeding  - continue PTA Reglan, neutraphos, potassium once verified     Panhypopituitarism  - continue PTA hydrocortisone, levothyroxine once verified     GERD  - continue PTA pantoprazole once verified     Hx recurrent aspiration pneumonia  - continue PTA Mucomyst and albuterol nebs qid once verified     Hx coccygeal pressure ulcer  Currently coccygeal region is erythematous, but appears more consistent with candida than pressure injury.     The patient's care was discussed with the Attending Physician, Dr. Maldonado, Bedside Nurse, Care Coordinator/, mother Savannah by phone, and Patient.    Isabelle Sandoval PA-C    Significant Results and Procedures   Neurology consult completed 12/19. They note \"No seizure or seizure like activity including staring during hospitalization.\" EEG 12/19- mild encephalopathy, no seizure or epileptiform activity. Neurology has cleared     Pending Results   Unresulted Labs Ordered in the Past 30 Days of this Admission     No orders found from 11/18/2020 to 12/19/2020.        Code Status   Full Code       Primary Care Physician   Carlos Gomez    Physical Exam   Temp: 97.5  F (36.4  C) Temp src: Axillary BP: 91/55 Pulse: 104   Resp: 18 SpO2: 95 % O2 Device: None (Room air)    Vitals:    12/18/20 2058 12/20/20 0524   Weight: 72.3 kg (159 lb 8 oz) 77.2 kg (170 lb 1.6 oz)     Vital Signs with Ranges  Temp:  [96.9  F (36.1  C)-97.6  F (36.4  C)] 97.5  F (36.4  C)  Pulse:  [100-104] 104  Resp:  " [16-18] 18  BP: ()/(55-63) 91/55  SpO2:  [91 %-95 %] 95 %  I/O last 3 completed shifts:  In: 720 [NG/GT:720]  Out: 750 [Urine:750]    Constitutional: resting comfortably in bed, no acute distress  Eyes: No scleral icterus or injection  ENT: Normocephalic, atraumatic  Respiratory: No secondary muscle use or labored breathing, no supplemental oxygen required. Lungs clear to auscultation bilaterally without wheezesi   Cardiovascular: RRR without murmur  GI: Abdomen soft, non-tender to palpation, non-distended.  Bowel sounds active  Lymph/Hematologic: no peripheral edema, PCDs on bilateral legs  Genitourinary: condom cath in place  Skin/Integumen: warm, dry, in tact without rash or hives  MSK: atrophic extremities, warm and well perfused  Neurologic: alert to voice, follows commands    Discharge Disposition   Discharged to home  Condition at discharge: Stable    Consultations This Hospital Stay   NEUROLOGY IP CONSULT  NUTRITION SERVICES ADULT IP CONSULT  PHARMACY IP CONSULT  CARE MANAGEMENT / SOCIAL WORK IP CONSULT  SOCIAL WORK IP CONSULT    Time Spent on this Encounter   I, Isabelle Sandoval PA-C, personally saw the patient today and spent less than or equal to 30 minutes discharging this patient.    Discharge Orders      Reason for your hospital stay    You were in the hospital to assess the episode of altered consciousness     Follow-up and recommended labs and tests     Follow up with your neurologist in clinic in 4 weeks     Activity    Your activity upon discharge: activity as tolerated     Diet    Follow this diet upon discharge: Orders Placed This Encounter      Adult Formula Drip Feeding: Continuous Isosource 1.5; Jejunostomy; Goal Rate: 55; mL/hr; Medication - Feeding Tube Flush Frequency: At least 15-30 mL water before and after medication administration and with tube clogging; OK to begin at goal rate      NPO for Medical/Clinical Reasons Except for: NPO but receiving Tube Feeding     Discharge  Medications   Current Discharge Medication List      CONTINUE these medications which have NOT CHANGED    Details   acetylcysteine (MUCOMYST) 20 % neb solution Take 2 mLs by nebulization 4 times daily With albuterol at 0700, 1100, 1500, and 1900       albuterol (PROVENTIL) (5 MG/ML) 0.5% neb solution Take 2.5 mg by nebulization every 4 hours (while awake) 0700 1100 1500 1900 with mucomyst       aspirin (ASA) 81 MG chewable tablet 81 mg by Oral or Feeding Tube route daily At 0900      bacitracin ointment Apply topically daily as needed for wound care To PEG site.       Brivaracetam (BRIVIACT) 10 MG/ML solution 100 mg by Oral or Feeding Tube route 2 times daily 0900, 2100      calcium carbonate 1250 MG/5ML SUSP suspension Take 1,250 mg by mouth 3 times daily 0900, 1500, 2100      !! carBAMazepine (TEGRETOL) 100 MG/5ML suspension Take 100 mg by mouth daily Take at 1800       !! carBAMazepine (TEGRETOL) 100 MG/5ML suspension 150 mg by Oral or Feeding Tube route 3 times daily At 06:00, 12:00, and 24:00 for seizures      clotrimazole-betamethasone (LOTRISONE) 1-0.05 % external cream Apply topically 2 times daily as needed   Qty:        ferrous sulfate 220 (44 Fe) MG/5ML ELIX 220 mg by Per Feeding Tube route daily @ 0900      Guar Gum (FIBER MODULAR, NUTRISOURCE FIBER,) packet 1 packet by Per G Tube route daily  Qty: 30 packet, Refills: 0    Associated Diagnoses: Pneumonia of both lower lobes due to infectious organism      !! hydrocortisone (CORTEF) 10 MG tablet 10 mg by Oral or FT or NG tube route daily (with dinner) At 1500      !! hydrocortisone (CORTEF) 10 MG tablet 20 mg by Oral or FT or NG tube route every morning At 0900      hydrocortisone 1 % CREA cream Place rectally 2 times daily as needed for other Apply to reddened memo areas as needed      levothyroxine (SYNTHROID/LEVOTHROID) 150 MCG tablet Take 150 mcg by mouth every morning At 0500      melatonin (MELATONIN) 1 MG/ML LIQD liquid 6 mg by Per NG tube route At  Bedtime       metoclopramide (REGLAN) 10 MG/10ML SOLN solution Take 10 mg by mouth 4 times daily (before meals and nightly) 0800, 1200, 1600, 2000  Disconnects bag before administration, then waits 45 mins before reconnecting after giving the medication      miconazole (MICATIN) 2 % AERP powder Apply topically 2 times daily as needed       mupirocin (BACTROBAN) 2 % external ointment Apply topically 2 times daily as needed       pantoprazole (PROTONIX) 2 mg/mL SUSP suspension 20 mLs (40 mg) by Per J Tube route daily  Qty: 400 mL, Refills: 0    Comments: Future refills by PCP Dr. Carlos Gomez with phone number [phone].  Associated Diagnoses: Gastroesophageal reflux disease, esophagitis presence not specified      potassium & sodium phosphates (NEUTRA-PHOS) 280-160-250 MG Packet Take 1 packet by mouth 3 times daily       Scopolamine HBr POWD Dispense #90. Mix contents with small amount of water for admin via J-tube.  Administer 0.8 mg three times each day.  Qty: 90 Bottle, Refills: 3    Associated Diagnoses: Aspiration pneumonia (H)      sodium bicarbonate 650 MG tablet Take 1 tablet (650 mg) by mouth daily  Qty: 30 tablet, Refills: 0    Associated Diagnoses: Hyponatremia      testosterone cypionate (DEPOTESTOTERONE CYPIONATE) 200 MG/ML injection Inject 76 mg into the muscle See Admin Instructions Every 2 weeks on Fridays  76 mg or 0.38 mL      vitamin C (ASCORBIC ACID) 1000 MG TABS Take 1,000 mg by mouth daily      vitamin D3 (CHOLECALCIFEROL) 2000 units (50 mcg) tablet Take 2,000 Units by mouth daily Crush and feed via j-tube @@ 0900      Multiple Vitamins-Minerals (EMERGEN-C VITAMIN C) PACK 1 packet by Oral or Feeding Tube route daily Vitamin C 1000 mg      potassium chloride (KLOR-CON) 20 MEQ packet 20 mEq by Oral or Feeding Tube route daily  Qty: 30 tablet, Refills: 0    Associated Diagnoses: Hypokalemia      prochlorperazine (COMPAZINE) 25 MG suppository Place 1 suppository (25 mg) rectally every 12 hours  as needed for nausea or vomiting  Qty: 15 suppository, Refills: 0    Comments: Future refills by PCP Dr. Carlos Gomez with phone number [phone].  Associated Diagnoses: Aspiration pneumonia of right middle lobe, unspecified aspiration pneumonia type (H)      Skin Protectants, Misc. (BALMEX SKIN PROTECTANT) OINT Externally apply topically 2 times daily as needed (irritation) Applay to reddened memo areas twice daily as needed       !! - Potential duplicate medications found. Please discuss with provider.        Allergies   Allergies   Allergen Reactions     Valproic Acid Other (See Comments)     Toxicity w/ bone marrow suspension, elevated ammonia levels      Dilantin [Phenytoin Sodium] Other (See Comments)     Severe Trembling     Scopolamine Hives     Hives with the patch - oral no problem     Data   Most Recent 3 CBC's:  Recent Labs   Lab Test 12/19/20  0430 12/18/20  1656 11/06/20  0434   WBC 9.1 9.6 10.5   HGB 12.3* 13.3 10.1*   MCV 88 87 95    213 220      Most Recent 3 BMP's:  Recent Labs   Lab Test 12/20/20  0520 12/19/20  1735 12/19/20  0430 12/18/20  1656 12/17/20  1242    134 132* 132* 130*   POTASSIUM  --   --  3.6 3.0* 3.1*   CHLORIDE  --   --  97 89* 90*   CO2  --   --  31 38* 37*   BUN  --   --  18 19 21   CR  --   --  0.80 0.86 0.79   ANIONGAP  --   --  4 5 3   STEVE  --   --  8.6 9.2 9.0   GLC  --   --  95 87 151*     Most Recent 2 LFT's:  Recent Labs   Lab Test 12/19/20  0430 11/06/20  0434   AST 22 18   ALT 25 29   ALKPHOS 97 96   BILITOTAL 0.4 0.3     Most Recent INR's and Anticoagulation Dosing History:  Anticoagulation Dose History     Recent Dosing and Labs Latest Ref Rng & Units 1/25/2017 1/30/2017 2/7/2017 1/1/2020 10/25/2020 10/25/2020 10/26/2020    INR 0.86 - 1.14 1.49(H) 1.32(H) 1.27(H) 1.28(H) 1.89(H) 1.86(H) 1.82(H)        Most Recent 3 Troponin's:  Recent Labs   Lab Test 10/25/20  2100 10/25/20  1345 10/25/20  0742   TROPI 0.047* 0.133* 0.095*     Most Recent Cholesterol  Panel:  Recent Labs   Lab Test 11/29/16  0430   TRIG 100     Most Recent 6 Bacteria Isolates From Any Culture (See EPIC Reports for Culture Details):  Recent Labs   Lab Test 11/01/20  2155 11/01/20  2130 10/25/20  1520 10/25/20  0758 10/25/20  0742 09/25/20  0103   CULT No growth No growth Light growth  Klebsiella pneumoniae  *  Light growth  Pseudomonas aeruginosa  *  Light growth  Methicillin resistant Staphylococcus aureus (MRSA)  * No growth  No growth No growth No growth     Most Recent TSH, T4 and A1c Labs:  Recent Labs   Lab Test 11/17/20  1121 08/12/20 2024 08/12/20 2024 07/05/18  0021 07/05/18  0021   TSH  --   --   --   --  <0.01*   T4 1.18   < >  --   --  1.66*   A1C  --   --  5.9*   < >  --     < > = values in this interval not displayed.     Results for orders placed or performed during the hospital encounter of 12/18/20   XR Chest Port 1 View    Narrative    XR CHEST PORT 1 VW 12/18/2020 5:37 PM    HISTORY: cough    COMPARISON: 11/1/2020      Impression    IMPRESSION: Low lung volumes. This exaggerates bilateral  atelectasis/consolidation. Elevated right hemidiaphragm. The  appearance is similar to 11/1/2020. No pleural effusion or  pneumothorax. The cardiac size is normal. Stable narrowing of the  trachea at the thoracic inlet.    JULIÁN MURO MD

## 2020-12-20 NOTE — PLAN OF CARE
"PRIMARY DIAGNOSIS: \"GENERIC\" NURSING  OUTPATIENT/OBSERVATION GOALS TO BE MET BEFORE DISCHARGE:  ADLs back to baseline: Yes    Activity and level of assistance: Bedrest    Pain status: Pain free.    Return to near baseline physical activity: Yes     Discharge Planner Nurse   Safe discharge environment identified: Yes  Barriers to discharge: No       Entered by: Sherry Gomez 12/20/2020 3:23 AM     Please review provider order for any additional goals.   Nurse to notify provider when observation goals have been met and patient is ready for discharge.  "

## 2020-12-20 NOTE — PLAN OF CARE
Mostly nonverbal, incoherent speech. Alert and responds to yes or no questions. VSS on room air. Denies pain. Total cares. Turn and repo Q2H or up with lift. Incontinent of bowel and bladder. Condom catheter in place with good output. 3 medium/large soft stools this shift. Blanchable redness to coccyx- new mepilex applied. Strict NPO. PEG tube with TF running at 55mL/hr- goal rate. EEG today, neurology following. Rechecking labs in AM. Possible discharge home tomorrow. Mother updated via telephone.

## 2020-12-20 NOTE — PROGRESS NOTES
[facility]    Neurology Progress Note     Assessment & Plan     Keyon Farias is a 60 year old male who was admitted on 12/18/2020. I was asked to see the patient for increasing staring episode.     Seizure vs drowsiness from medication  Medication will be adjusted based on clinical symptom change and CBZ level    No seizure or seizure like activity including staring during hospitalization     -On Tegretol 150 mg 6am /150 mg noon /100 mg 6pm /150 midnight ( Recently decreased the dose)  -On Briviact 100 mg /10 ml (10 mg/ml) BID  - No plan for changing meds  -CBZ- 15.4 (>12)--> 10 (12/19)  -Sodium 132  -UA Neg, ammonia 45  -EEG 12/19- mild encephalopathy, no seizure or epileptiform activity  -Ok to discharge today  -Neurology follow up in 4 weeks as an outpatient, EYAL Terrell MD  [facility]  Office Phone [phone]    Beronica Terrell MD    Interval History     No seizure or seizure like activity including staring during hospitalization    Physical Exam   Temp: 97.5  F (36.4  C) Temp src: Axillary BP: 91/55 Pulse: 104   Resp: 18 SpO2: 95 % O2 Device: None (Room air)    Vitals:    12/18/20 2058 12/20/20 0524   Weight: 72.3 kg (159 lb 8 oz) 77.2 kg (170 lb 1.6 oz)     Vital Signs with Ranges  Temp:  [96.9  F (36.1  C)-97.6  F (36.4  C)] 97.5  F (36.4  C)  Pulse:  [100-104] 104  Resp:  [16-18] 18  BP: ()/(55-63) 91/55  SpO2:  [91 %-95 %] 95 %  I/O last 3 completed shifts:  In: 720 [NG/GT:720]  Out: 750 [Urine:750]      Medications     dextrose         acetylcysteine  2 mL Nebulization 4x Daily     albuterol  2.5 mg Nebulization Q4H While awake     aspirin  81 mg Oral or Feeding Tube Daily     Brivaracetam  100 mg Oral BID     calcium carbonate  1,250 mg Oral TID     carBAMazepine  100 mg Oral Q24H     carBAMazepine  150 mg Oral TID     ferrous sulfate  220 mg Oral Daily     fiber modular (NUTRISOURCE FIBER)  1 packet Per G Tube Daily     hydrocortisone  10 mg Per Feeding Tube Daily  with supper     hydrocortisone  20 mg Per Feeding Tube QAM     levothyroxine  150 mcg Oral QAM     metoclopramide  10 mg Oral 4x Daily AC & HS     pantoprazole  40 mg Per J Tube Daily     potassium & sodium phosphates  1 packet Oral TID     potassium chloride  20 mEq Oral or Feeding Tube Daily     sodium bicarbonate  650 mg Oral Daily     sodium chloride (PF)  3 mL Intracatheter Q8H     vitamin C  1,000 mg Oral Daily     vitamin D3  50 mcg Oral Daily     This is a telemedicine visit that was performed with the originating site at [facility] and the distant site at provider s home in [place], [place]. Verbal consent was given to participate in video visit using [employer] real-time telemedicine video conference.  This visit occurred during the Coronavirus (COVID-19) Public Health Emergency and was requested by patient due to contact concerns.     I discussed with the patient the nature of our telemedicine visits, that:      I would evaluate the patient and recommend diagnostics and treatments based on my assessment and the exam is limited due to the nature of telemedicine visit.    Our sessions are not being recorded and that personal health information is protected    Our team would provide followup care in person if/when the patient needs it.     Patient identification was verified before the start of the encounter.         total time : 25 minutes  More than 50% of the time was spent on counseling family- mother as well as discussing/coordinating care with hospital staffs.

## 2020-12-20 NOTE — PROGRESS NOTES
"PRIMARY DIAGNOSIS: \"GENERIC\" NURSING  OUTPATIENT/OBSERVATION GOALS TO BE MET BEFORE DISCHARGE:  1. ADLs back to baseline: Yes     2. Activity and level of assistance: Bedrest     3. Pain status: Pain free.     4. Return to near baseline physical activity: Yes          Discharge Planner Nurse   Safe discharge environment identified: Yes  Barriers to discharge: No       "

## 2020-12-20 NOTE — PLAN OF CARE
Pt is Alert to self, VSS on RA, tube feeding @ 55ml/hr, flush 30 ml between  and 60 ml feeding tube flush Q4hrs, condom catheter in placed, out put 200 ml, skin intact except coccyx region, redness and mapilex in place, IV removed, AVS went over with pt mother over phone, pt left the floor via EMS transport, at 6 pm.

## 2020-12-20 NOTE — PLAN OF CARE
"PRIMARY DIAGNOSIS: \"GENERIC\" NURSING  OUTPATIENT/OBSERVATION GOALS TO BE MET BEFORE DISCHARGE:  1. ADLs back to baseline: Yes    2. Activity and level of assistance: Bedrest    3. Pain status: Pain free.    4. Return to near baseline physical activity: Yes     Discharge Planner Nurse   Safe discharge environment identified: Yes  Barriers to discharge: No       Entered by: Sherry Gomez 12/20/2020 6:58 AM     Pt is covid neg, alert, non verbal, VSS on RA. No sign of pain noted.Total care, turn and rep, incontinent B&B, condom catheter connected to urinary bag draining o.k.NPO,Peg tube in place on continuous tube feeding@55mls/hr, manual water flushes 60ml Q 4hrs.PIV is s/l on lt AC.Mepilex on coccyx for blanchabl redness C/D/I.CC following neurology consulted.Plan is possible discharge back home with mom and PCA today.    "

## 2020-12-20 NOTE — CONSULTS
CHE acknowledges consult for filing Vulnerable Adult report. Report not filed due to no new information since last V.A. report was filed. For future reference, any health care professional with new V.A. info is to file report:     For an immediate response--24 hours a day, seven days a week--call [phone] to reach the [place] [facility] Reporting [facility].    Report form available through: [email or web address].    CHE has identified no other social service needs at this time. CHE will remain available should other needs arise.    BAYRON Epperson  Daytime (8:00am-4:30pm): [phone]  After-Hours SW Pager (4:30pm-11:30pm): [phone]

## 2020-12-20 NOTE — DISCHARGE INSTRUCTIONS
"Agency information     Primary Care   [facility] [phone]  [facility] (phone [phone], fax [phone])  12 hours RN coverage daily   [facility] (per note \"currently 80.5 hours every 7 days\")   12 hours PCA evening / night   [facility] (phone [phone])  Also supply [facility]   For TF supplies  Legal Guardian  Mother, Savannah  Transportation   Stretcher rides via [employer] (phone [phone], fax [phone])  ramp available at home address [id number] [address]  "

## 2020-12-20 NOTE — PLAN OF CARE
PT alert when awoken, smiles at staff and able to nod yes or no but speech is incoherent. Tube feeding going to goal rate of 55mL/hr. Denies pain.

## 2020-12-20 NOTE — PROGRESS NOTES
Care Management Discharge Note    Discharge Date: 12/20/20       Discharge Disposition: Home Care, Home, Home Infusion, DME    Discharge Services: County Worker, PCA, Housekeeping/[employer]    Discharge DME:      Discharge Transportation: health plan transportation    Private pay costs discussed: Not applicable    PAS Confirmation Code:    Patient/family educated on Medicare website which has current facility and service quality ratings: yes    Education Provided on the Discharge Plan:  yes  Persons Notified of Discharge Plans: guardian/mother and medical team  Patient/Family in Agreement with the Plan:  yes    Handoff Referral Completed: No    Additional Information:  CHE informed that patient ready for discharge. Scheduled transport at 1700, the earliest [facility] has an opening. Informed patient's mother/guardian who acknowledged discharge plan.    CHE has identified no other social service needs at this time. SW will remain available should other needs arise.    BAYRON Epperson  Daytime (8:00am-4:30pm): [phone]  After-Hours SW Pager (4:30pm-11:30pm): [phone]             BAYRON Lester

## 2020-12-20 NOTE — PATIENT INSTRUCTIONS
Continue using nebulizers three times per day    Continue scopolamine and doing good brushing of the teeth and mouth    Talk to Keyon's primary care doctor about gastric bag supplies

## 2021-01-11 ENCOUNTER — TELEPHONE (OUTPATIENT)
Dept: ENDOCRINOLOGY | Facility: CLINIC | Age: 61
End: 2021-01-11

## 2021-01-11 NOTE — TELEPHONE ENCOUNTER
[facility]    Phone Message    May a detailed message be left on voicemail: yes     Reason for Call: Other: Pt's mother Savannah is wanting to know when the provider is wanting the Pt to get their next blood draw. She stated that it's been about 3 weeks since last his last blood draw. Please call back to discuss. Also she has some questions about how to make sure the provider will be refilling all of the Pt's medications. Please review, and also discuss when calling back.      Action Taken: Message routed to:  [facility] endo    Travel Screening: Not Applicable                                                                      '

## 2021-01-11 NOTE — TELEPHONE ENCOUNTER
Patients mother notified to discuss with physician at time of appointment if she is ok with taking over prescribing medication and discuss blood draw.

## 2021-01-15 ENCOUNTER — APPOINTMENT (OUTPATIENT)
Dept: GENERAL RADIOLOGY | Facility: CLINIC | Age: 61
DRG: 872 | End: 2021-01-15
Attending: EMERGENCY MEDICINE
Payer: MEDICARE

## 2021-01-15 ENCOUNTER — HOSPITAL ENCOUNTER (INPATIENT)
Facility: CLINIC | Age: 61
LOS: 3 days | Discharge: HOME HEALTH | DRG: 872 | End: 2021-01-18
Attending: EMERGENCY MEDICINE | Admitting: INTERNAL MEDICINE
Payer: MEDICARE

## 2021-01-15 ENCOUNTER — APPOINTMENT (OUTPATIENT)
Dept: CT IMAGING | Facility: CLINIC | Age: 61
DRG: 872 | End: 2021-01-15
Attending: EMERGENCY MEDICINE
Payer: MEDICARE

## 2021-01-15 DIAGNOSIS — R11.2 NAUSEA AND VOMITING, INTRACTABILITY OF VOMITING NOT SPECIFIED, UNSPECIFIED VOMITING TYPE: ICD-10-CM

## 2021-01-15 DIAGNOSIS — J69.0 ASPIRATION PNEUMONITIS (H): ICD-10-CM

## 2021-01-15 DIAGNOSIS — G40.909 RECURRENT SEIZURES (H): Primary | ICD-10-CM

## 2021-01-15 DIAGNOSIS — R50.9 FEVER AND CHILLS: ICD-10-CM

## 2021-01-15 DIAGNOSIS — N39.0 URINARY TRACT INFECTION WITHOUT HEMATURIA, SITE UNSPECIFIED: ICD-10-CM

## 2021-01-15 DIAGNOSIS — E87.6 HYPOKALEMIA: ICD-10-CM

## 2021-01-15 PROBLEM — A41.9 SEPSIS DUE TO URINARY TRACT INFECTION (H): Status: ACTIVE | Noted: 2021-01-15

## 2021-01-15 LAB
ALBUMIN SERPL-MCNC: 3.2 G/DL (ref 3.4–5)
ALBUMIN UR-MCNC: 30 MG/DL
ALP SERPL-CCNC: 92 U/L (ref 40–150)
ALT SERPL W P-5'-P-CCNC: 25 U/L (ref 0–70)
AMORPH CRY #/AREA URNS HPF: ABNORMAL /HPF
ANION GAP SERPL CALCULATED.3IONS-SCNC: 6 MMOL/L (ref 3–14)
APPEARANCE UR: ABNORMAL
AST SERPL W P-5'-P-CCNC: 28 U/L (ref 0–45)
BACTERIA #/AREA URNS HPF: ABNORMAL /HPF
BASE EXCESS BLDV CALC-SCNC: 6.9 MMOL/L
BASOPHILS # BLD AUTO: 0.1 10E9/L (ref 0–0.2)
BASOPHILS NFR BLD AUTO: 0.6 %
BILIRUB SERPL-MCNC: 0.4 MG/DL (ref 0.2–1.3)
BILIRUB UR QL STRIP: NEGATIVE
BUN SERPL-MCNC: 22 MG/DL (ref 7–30)
CALCIUM SERPL-MCNC: 8.5 MG/DL (ref 8.5–10.1)
CHLORIDE SERPL-SCNC: 98 MMOL/L (ref 94–109)
CO2 SERPL-SCNC: 29 MMOL/L (ref 20–32)
COLOR UR AUTO: YELLOW
CREAT SERPL-MCNC: 0.86 MG/DL (ref 0.66–1.25)
DIFFERENTIAL METHOD BLD: ABNORMAL
EOSINOPHIL # BLD AUTO: 0.7 10E9/L (ref 0–0.7)
EOSINOPHIL NFR BLD AUTO: 4.2 %
ERYTHROCYTE [DISTWIDTH] IN BLOOD BY AUTOMATED COUNT: 13.8 % (ref 10–15)
FLUAV RNA RESP QL NAA+PROBE: NEGATIVE
FLUBV RNA RESP QL NAA+PROBE: NEGATIVE
GFR SERPL CREATININE-BSD FRML MDRD: >90 ML/MIN/{1.73_M2}
GLUCOSE SERPL-MCNC: 94 MG/DL (ref 70–99)
GLUCOSE UR STRIP-MCNC: NEGATIVE MG/DL
HCO3 BLDV-SCNC: 31 MMOL/L (ref 21–28)
HCT VFR BLD AUTO: 39.2 % (ref 40–53)
HGB BLD-MCNC: 12.9 G/DL (ref 13.3–17.7)
HGB UR QL STRIP: NEGATIVE
IMM GRANULOCYTES # BLD: 0.1 10E9/L (ref 0–0.4)
IMM GRANULOCYTES NFR BLD: 0.4 %
INTERPRETATION ECG - MUSE: NORMAL
KETONES UR STRIP-MCNC: NEGATIVE MG/DL
LABORATORY COMMENT REPORT: NORMAL
LACTATE BLD-SCNC: 1.8 MMOL/L (ref 0.7–2)
LEUKOCYTE ESTERASE UR QL STRIP: ABNORMAL
LIPASE SERPL-CCNC: 153 U/L (ref 73–393)
LYMPHOCYTES # BLD AUTO: 2.9 10E9/L (ref 0.8–5.3)
LYMPHOCYTES NFR BLD AUTO: 18 %
MAGNESIUM SERPL-MCNC: 2.1 MG/DL (ref 1.6–2.3)
MCH RBC QN AUTO: 29.2 PG (ref 26.5–33)
MCHC RBC AUTO-ENTMCNC: 32.9 G/DL (ref 31.5–36.5)
MCV RBC AUTO: 89 FL (ref 78–100)
MONOCYTES # BLD AUTO: 1.5 10E9/L (ref 0–1.3)
MONOCYTES NFR BLD AUTO: 9.3 %
MUCOUS THREADS #/AREA URNS LPF: PRESENT /LPF
NEUTROPHILS # BLD AUTO: 10.9 10E9/L (ref 1.6–8.3)
NEUTROPHILS NFR BLD AUTO: 67.5 %
NITRATE UR QL: NEGATIVE
NRBC # BLD AUTO: 0 10*3/UL
NRBC BLD AUTO-RTO: 0 /100
O2/TOTAL GAS SETTING VFR VENT: ABNORMAL %
PCO2 BLDV: 43 MM HG (ref 40–50)
PH BLDV: 7.47 PH (ref 7.32–7.43)
PH UR STRIP: 7.5 PH (ref 5–7)
PHOSPHATE SERPL-MCNC: 2.6 MG/DL (ref 2.5–4.5)
PLATELET # BLD AUTO: 144 10E9/L (ref 150–450)
PO2 BLDV: 92 MM HG (ref 25–47)
POTASSIUM SERPL-SCNC: 3.9 MMOL/L (ref 3.4–5.3)
POTASSIUM SERPL-SCNC: 4 MMOL/L (ref 3.4–5.3)
PROT SERPL-MCNC: 7.7 G/DL (ref 6.8–8.8)
RBC # BLD AUTO: 4.42 10E12/L (ref 4.4–5.9)
RBC #/AREA URNS AUTO: 16 /HPF (ref 0–2)
RSV RNA SPEC QL NAA+PROBE: NORMAL
SARS-COV-2 RNA RESP QL NAA+PROBE: NEGATIVE
SODIUM SERPL-SCNC: 133 MMOL/L (ref 133–144)
SOURCE: ABNORMAL
SP GR UR STRIP: 1.03 (ref 1–1.03)
SPECIMEN SOURCE: NORMAL
UROBILINOGEN UR STRIP-MCNC: 4 MG/DL (ref 0–2)
WBC # BLD AUTO: 16.2 10E9/L (ref 4–11)
WBC #/AREA URNS AUTO: >182 /HPF (ref 0–5)
WBC CLUMPS #/AREA URNS HPF: PRESENT /HPF

## 2021-01-15 PROCEDURE — 83605 ASSAY OF LACTIC ACID: CPT | Performed by: EMERGENCY MEDICINE

## 2021-01-15 PROCEDURE — 71045 X-RAY EXAM CHEST 1 VIEW: CPT

## 2021-01-15 PROCEDURE — 84100 ASSAY OF PHOSPHORUS: CPT | Performed by: INTERNAL MEDICINE

## 2021-01-15 PROCEDURE — 250N000011 HC RX IP 250 OP 636: Performed by: EMERGENCY MEDICINE

## 2021-01-15 PROCEDURE — 96361 HYDRATE IV INFUSION ADD-ON: CPT

## 2021-01-15 PROCEDURE — 258N000003 HC RX IP 258 OP 636: Performed by: EMERGENCY MEDICINE

## 2021-01-15 PROCEDURE — 250N000009 HC RX 250

## 2021-01-15 PROCEDURE — 87800 DETECT AGNT MULT DNA DIREC: CPT | Performed by: EMERGENCY MEDICINE

## 2021-01-15 PROCEDURE — 87636 SARSCOV2 & INF A&B AMP PRB: CPT | Performed by: EMERGENCY MEDICINE

## 2021-01-15 PROCEDURE — 87181 SC STD AGAR DILUTION PER AGT: CPT | Performed by: EMERGENCY MEDICINE

## 2021-01-15 PROCEDURE — 87040 BLOOD CULTURE FOR BACTERIA: CPT | Performed by: EMERGENCY MEDICINE

## 2021-01-15 PROCEDURE — 250N000013 HC RX MED GY IP 250 OP 250 PS 637: Performed by: INTERNAL MEDICINE

## 2021-01-15 PROCEDURE — 84132 ASSAY OF SERUM POTASSIUM: CPT | Performed by: INTERNAL MEDICINE

## 2021-01-15 PROCEDURE — 250N000011 HC RX IP 250 OP 636: Performed by: INTERNAL MEDICINE

## 2021-01-15 PROCEDURE — 250N000013 HC RX MED GY IP 250 OP 250 PS 637

## 2021-01-15 PROCEDURE — 93005 ELECTROCARDIOGRAM TRACING: CPT

## 2021-01-15 PROCEDURE — 94640 AIRWAY INHALATION TREATMENT: CPT | Mod: ZZRT

## 2021-01-15 PROCEDURE — 272N000078 HC NUTRITION PRODUCT INTERMEDIATE LITER

## 2021-01-15 PROCEDURE — 250N000009 HC RX 250: Performed by: EMERGENCY MEDICINE

## 2021-01-15 PROCEDURE — 87088 URINE BACTERIA CULTURE: CPT | Performed by: EMERGENCY MEDICINE

## 2021-01-15 PROCEDURE — 87077 CULTURE AEROBIC IDENTIFY: CPT | Performed by: EMERGENCY MEDICINE

## 2021-01-15 PROCEDURE — 71260 CT THORAX DX C+: CPT | Mod: MG

## 2021-01-15 PROCEDURE — 250N000009 HC RX 250: Performed by: HOSPITALIST

## 2021-01-15 PROCEDURE — 36415 COLL VENOUS BLD VENIPUNCTURE: CPT | Performed by: INTERNAL MEDICINE

## 2021-01-15 PROCEDURE — 80053 COMPREHEN METABOLIC PANEL: CPT | Performed by: EMERGENCY MEDICINE

## 2021-01-15 PROCEDURE — 96365 THER/PROPH/DIAG IV INF INIT: CPT | Mod: 59

## 2021-01-15 PROCEDURE — 82803 BLOOD GASES ANY COMBINATION: CPT | Performed by: EMERGENCY MEDICINE

## 2021-01-15 PROCEDURE — 250N000013 HC RX MED GY IP 250 OP 250 PS 637: Performed by: HOSPITALIST

## 2021-01-15 PROCEDURE — 999N000157 HC STATISTIC RCP TIME EA 10 MIN

## 2021-01-15 PROCEDURE — 83735 ASSAY OF MAGNESIUM: CPT | Performed by: INTERNAL MEDICINE

## 2021-01-15 PROCEDURE — G0463 HOSPITAL OUTPT CLINIC VISIT: HCPCS

## 2021-01-15 PROCEDURE — 83690 ASSAY OF LIPASE: CPT | Performed by: EMERGENCY MEDICINE

## 2021-01-15 PROCEDURE — C9803 HOPD COVID-19 SPEC COLLECT: HCPCS

## 2021-01-15 PROCEDURE — 87186 SC STD MICRODIL/AGAR DIL: CPT | Performed by: EMERGENCY MEDICINE

## 2021-01-15 PROCEDURE — 99285 EMERGENCY DEPT VISIT HI MDM: CPT | Mod: 25

## 2021-01-15 PROCEDURE — 99207 PR NO CHARGE LOS: CPT | Performed by: HOSPITALIST

## 2021-01-15 PROCEDURE — 258N000003 HC RX IP 258 OP 636: Performed by: INTERNAL MEDICINE

## 2021-01-15 PROCEDURE — 85025 COMPLETE CBC W/AUTO DIFF WBC: CPT | Performed by: EMERGENCY MEDICINE

## 2021-01-15 PROCEDURE — 81001 URINALYSIS AUTO W/SCOPE: CPT | Performed by: EMERGENCY MEDICINE

## 2021-01-15 PROCEDURE — 87086 URINE CULTURE/COLONY COUNT: CPT | Performed by: EMERGENCY MEDICINE

## 2021-01-15 PROCEDURE — 99223 1ST HOSP IP/OBS HIGH 75: CPT | Mod: AI | Performed by: INTERNAL MEDICINE

## 2021-01-15 PROCEDURE — 120N000001 HC R&B MED SURG/OB

## 2021-01-15 RX ORDER — CARBAMAZEPINE 100 MG/5ML
150 SUSPENSION ORAL 3 TIMES DAILY
Status: DISCONTINUED | OUTPATIENT
Start: 2021-01-15 | End: 2021-01-18 | Stop reason: HOSPADM

## 2021-01-15 RX ORDER — BISACODYL 10 MG
10 SUPPOSITORY, RECTAL RECTAL DAILY PRN
Status: DISCONTINUED | OUTPATIENT
Start: 2021-01-15 | End: 2021-01-18 | Stop reason: HOSPADM

## 2021-01-15 RX ORDER — SENNOSIDES 8.6 MG
650 CAPSULE ORAL EVERY 8 HOURS PRN
COMMUNITY
End: 2023-09-09

## 2021-01-15 RX ORDER — GUAR GUM
1 PACKET (EA) ORAL DAILY
Status: DISCONTINUED | OUTPATIENT
Start: 2021-01-15 | End: 2021-01-18 | Stop reason: HOSPADM

## 2021-01-15 RX ORDER — CARBAMAZEPINE 100 MG/5ML
100 SUSPENSION ORAL DAILY
Status: DISCONTINUED | OUTPATIENT
Start: 2021-01-15 | End: 2021-01-17

## 2021-01-15 RX ORDER — METOCLOPRAMIDE HYDROCHLORIDE 5 MG/5ML
10 SOLUTION ORAL
Status: DISCONTINUED | OUTPATIENT
Start: 2021-01-15 | End: 2021-01-18 | Stop reason: HOSPADM

## 2021-01-15 RX ORDER — AMOXICILLIN 250 MG
2 CAPSULE ORAL 2 TIMES DAILY PRN
Status: DISCONTINUED | OUTPATIENT
Start: 2021-01-15 | End: 2021-01-18 | Stop reason: HOSPADM

## 2021-01-15 RX ORDER — SODIUM BICARBONATE 650 MG/1
650 TABLET ORAL DAILY
Status: DISCONTINUED | OUTPATIENT
Start: 2021-01-15 | End: 2021-01-17

## 2021-01-15 RX ORDER — HYDROCORTISONE 20 MG/1
20 TABLET ORAL EVERY MORNING
Status: DISCONTINUED | OUTPATIENT
Start: 2021-01-16 | End: 2021-01-18 | Stop reason: HOSPADM

## 2021-01-15 RX ORDER — AMOXICILLIN 250 MG
1 CAPSULE ORAL 2 TIMES DAILY PRN
Status: DISCONTINUED | OUTPATIENT
Start: 2021-01-15 | End: 2021-01-18 | Stop reason: HOSPADM

## 2021-01-15 RX ORDER — POLYETHYLENE GLYCOL 3350 17 G/17G
17 POWDER, FOR SOLUTION ORAL DAILY PRN
Status: DISCONTINUED | OUTPATIENT
Start: 2021-01-15 | End: 2021-01-18 | Stop reason: HOSPADM

## 2021-01-15 RX ORDER — ACETYLCYSTEINE 200 MG/ML
2 SOLUTION ORAL; RESPIRATORY (INHALATION) 4 TIMES DAILY
Status: DISCONTINUED | OUTPATIENT
Start: 2021-01-15 | End: 2021-01-18 | Stop reason: HOSPADM

## 2021-01-15 RX ORDER — SODIUM CHLORIDE, SODIUM LACTATE, POTASSIUM CHLORIDE, CALCIUM CHLORIDE 600; 310; 30; 20 MG/100ML; MG/100ML; MG/100ML; MG/100ML
INJECTION, SOLUTION INTRAVENOUS CONTINUOUS
Status: ACTIVE | OUTPATIENT
Start: 2021-01-15 | End: 2021-01-15

## 2021-01-15 RX ORDER — HYDROCORTISONE 10 MG/1
10 TABLET ORAL
Status: DISCONTINUED | OUTPATIENT
Start: 2021-01-15 | End: 2021-01-18 | Stop reason: HOSPADM

## 2021-01-15 RX ORDER — POTASSIUM CHLORIDE 1.5 G/1.58G
20 POWDER, FOR SOLUTION ORAL DAILY
Status: DISCONTINUED | OUTPATIENT
Start: 2021-01-15 | End: 2021-01-18 | Stop reason: HOSPADM

## 2021-01-15 RX ORDER — ACETAMINOPHEN 325 MG/1
650 TABLET ORAL EVERY 4 HOURS PRN
Status: DISCONTINUED | OUTPATIENT
Start: 2021-01-15 | End: 2021-01-17

## 2021-01-15 RX ORDER — ACETAMINOPHEN 650 MG/1
650 SUPPOSITORY RECTAL EVERY 4 HOURS PRN
Status: DISCONTINUED | OUTPATIENT
Start: 2021-01-15 | End: 2021-01-18 | Stop reason: HOSPADM

## 2021-01-15 RX ORDER — LIDOCAINE 40 MG/G
CREAM TOPICAL
Status: DISCONTINUED | OUTPATIENT
Start: 2021-01-15 | End: 2021-01-18 | Stop reason: HOSPADM

## 2021-01-15 RX ORDER — METOCLOPRAMIDE HYDROCHLORIDE 5 MG/5ML
10 SOLUTION ORAL
Status: DISCONTINUED | OUTPATIENT
Start: 2021-01-15 | End: 2021-01-15

## 2021-01-15 RX ORDER — ASPIRIN 81 MG/1
81 TABLET, CHEWABLE ORAL DAILY
Status: DISCONTINUED | OUTPATIENT
Start: 2021-01-16 | End: 2021-01-18 | Stop reason: HOSPADM

## 2021-01-15 RX ORDER — DEXTROSE MONOHYDRATE 100 MG/ML
INJECTION, SOLUTION INTRAVENOUS CONTINUOUS PRN
Status: DISCONTINUED | OUTPATIENT
Start: 2021-01-15 | End: 2021-01-18 | Stop reason: HOSPADM

## 2021-01-15 RX ORDER — PIPERACILLIN SODIUM, TAZOBACTAM SODIUM 4; .5 G/20ML; G/20ML
4.5 INJECTION, POWDER, LYOPHILIZED, FOR SOLUTION INTRAVENOUS ONCE
Status: COMPLETED | OUTPATIENT
Start: 2021-01-15 | End: 2021-01-15

## 2021-01-15 RX ORDER — ONDANSETRON 2 MG/ML
4 INJECTION INTRAMUSCULAR; INTRAVENOUS EVERY 6 HOURS PRN
Status: DISCONTINUED | OUTPATIENT
Start: 2021-01-15 | End: 2021-01-18 | Stop reason: HOSPADM

## 2021-01-15 RX ORDER — CEFTRIAXONE 1 G/1
1 INJECTION, POWDER, FOR SOLUTION INTRAMUSCULAR; INTRAVENOUS EVERY 24 HOURS
Status: DISCONTINUED | OUTPATIENT
Start: 2021-01-15 | End: 2021-01-18

## 2021-01-15 RX ORDER — IOPAMIDOL 755 MG/ML
85 INJECTION, SOLUTION INTRAVASCULAR ONCE
Status: COMPLETED | OUTPATIENT
Start: 2021-01-15 | End: 2021-01-15

## 2021-01-15 RX ORDER — ALBUTEROL SULFATE 0.83 MG/ML
2.5 SOLUTION RESPIRATORY (INHALATION)
Status: DISCONTINUED | OUTPATIENT
Start: 2021-01-15 | End: 2021-01-18 | Stop reason: HOSPADM

## 2021-01-15 RX ORDER — LEVOTHYROXINE SODIUM 75 UG/1
150 TABLET ORAL EVERY MORNING
Status: DISCONTINUED | OUTPATIENT
Start: 2021-01-16 | End: 2021-01-17

## 2021-01-15 RX ORDER — ONDANSETRON 4 MG/1
4 TABLET, ORALLY DISINTEGRATING ORAL EVERY 6 HOURS PRN
Status: DISCONTINUED | OUTPATIENT
Start: 2021-01-15 | End: 2021-01-18 | Stop reason: HOSPADM

## 2021-01-15 RX ORDER — ACETYLCYSTEINE 200 MG/ML
2 SOLUTION ORAL; RESPIRATORY (INHALATION) 4 TIMES DAILY
Status: DISCONTINUED | OUTPATIENT
Start: 2021-01-15 | End: 2021-01-15

## 2021-01-15 RX ADMIN — Medication 1 PACKET: at 12:46

## 2021-01-15 RX ADMIN — SODIUM CHLORIDE, POTASSIUM CHLORIDE, SODIUM LACTATE AND CALCIUM CHLORIDE: 600; 310; 30; 20 INJECTION, SOLUTION INTRAVENOUS at 08:36

## 2021-01-15 RX ADMIN — SODIUM CHLORIDE 66 ML: 9 INJECTION, SOLUTION INTRAVENOUS at 02:45

## 2021-01-15 RX ADMIN — HYDROCORTISONE 10 MG: 10 TABLET ORAL at 16:28

## 2021-01-15 RX ADMIN — BRIVARACETAM 100 MG: 10 SOLUTION ORAL at 14:20

## 2021-01-15 RX ADMIN — ALBUTEROL SULFATE 2.5 MG: 2.5 SOLUTION RESPIRATORY (INHALATION) at 19:22

## 2021-01-15 RX ADMIN — PANTOPRAZOLE SODIUM 40 MG: 40 TABLET, DELAYED RELEASE ORAL at 14:20

## 2021-01-15 RX ADMIN — POTASSIUM CHLORIDE 20 MEQ: 1.5 POWDER, FOR SOLUTION ORAL at 14:20

## 2021-01-15 RX ADMIN — BRIVARACETAM 100 MG: 10 SOLUTION ORAL at 21:03

## 2021-01-15 RX ADMIN — PIPERACILLIN SODIUM AND TAZOBACTAM SODIUM 4.5 G: 4; .5 INJECTION, POWDER, LYOPHILIZED, FOR SOLUTION INTRAVENOUS at 02:15

## 2021-01-15 RX ADMIN — CEFTRIAXONE SODIUM 1 G: 1 INJECTION, POWDER, FOR SOLUTION INTRAMUSCULAR; INTRAVENOUS at 10:05

## 2021-01-15 RX ADMIN — SODIUM BICARBONATE 650 MG TABLET 650 MG: at 14:20

## 2021-01-15 RX ADMIN — IOPAMIDOL 85 ML: 755 INJECTION, SOLUTION INTRAVENOUS at 02:45

## 2021-01-15 RX ADMIN — SODIUM CHLORIDE 1000 ML: 9 INJECTION, SOLUTION INTRAVENOUS at 02:05

## 2021-01-15 RX ADMIN — CARBAMAZEPINE 150 MG: 100 SUSPENSION ORAL at 14:21

## 2021-01-15 RX ADMIN — POTASSIUM & SODIUM PHOSPHATES POWDER PACK 280-160-250 MG 1 PACKET: 280-160-250 PACK at 21:03

## 2021-01-15 RX ADMIN — CARBAMAZEPINE 100 MG: 100 SUSPENSION ORAL at 19:57

## 2021-01-15 RX ADMIN — METOCLOPRAMIDE HYDROCHLORIDE 10 MG: 5 SOLUTION ORAL at 22:50

## 2021-01-15 RX ADMIN — POTASSIUM & SODIUM PHOSPHATES POWDER PACK 280-160-250 MG 1 PACKET: 280-160-250 PACK at 16:28

## 2021-01-15 RX ADMIN — ACETYLCYSTEINE 2 ML: 200 SOLUTION ORAL; RESPIRATORY (INHALATION) at 19:21

## 2021-01-15 ASSESSMENT — MIFFLIN-ST. JEOR
SCORE: 1613.24
SCORE: 1589.66

## 2021-01-15 ASSESSMENT — ACTIVITIES OF DAILY LIVING (ADL)
ADLS_ACUITY_SCORE: 40
ADLS_ACUITY_SCORE: 40
ADLS_ACUITY_SCORE: 41
ADLS_ACUITY_SCORE: 37

## 2021-01-15 NOTE — PHARMACY-ADMISSION MEDICATION HISTORY
Pharmacy Medication History  Admission medication history interview status for the 1/15/2021  admission is complete. See EPIC admission navigator for prior to admission medications       Medication history sources: Patient's family/friend (mother, Savannah), Surescripts, Care Everywhere and discharge summary from 12/18/20  Location of interview: Phone  Adherence Assessment: Good    Significant changes made to the medication list:  Added: Tylenol    Additional medication history information:   Verified with mother that nothing has changed since he last admission and verified last doses.     Medication reconciliation completed by provider prior to medication history? No    Time spent in this activity: 20 mins      Prior to Admission medications    Medication Sig Last Dose Taking? Auth Provider   acetaminophen (TYLENOL 8 HOUR) 650 MG CR tablet Take 650 mg by mouth every 8 hours as needed for mild pain or fever 1/14/2021 at Unknown time Yes Unknown, Entered By History   acetylcysteine (MUCOMYST) 20 % neb solution Take 2 mLs by nebulization 4 times daily With albuterol at 0700, 1100, 1500, and 1900  1/14/2021 at 1900 Yes Unknown, Entered By History   albuterol (PROVENTIL) (5 MG/ML) 0.5% neb solution Take 2.5 mg by nebulization every 4 hours (while awake) 0700 1100 1500 1900 with mucomyst  1/14/2021 at 1900 Yes Unknown, Entered By History   aspirin (ASA) 81 MG chewable tablet 81 mg by Oral or Feeding Tube route daily At 0900 1/14/2021 at Unknown time Yes Unknown, Entered By History   bacitracin ointment Apply topically daily as needed for wound care To PEG site.   at prn Yes Unknown, Entered By History   Brivaracetam (BRIVIACT) 10 MG/ML solution 100 mg by Oral or Feeding Tube route 2 times daily 0900, 2100 1/14/2021 at 2100 Yes Unknown, Entered By History   calcium carbonate 1250 MG/5ML SUSP suspension Take 1,250 mg by mouth 3 times daily 0900, 1500, 2100 1/14/2021 at 2100 Yes Unknown, Entered By History   carBAMazepine  (TEGRETOL) 100 MG/5ML suspension Take 100 mg by mouth daily Take at 1800  1/14/2021 at 1800 Yes Unknown, Entered By History   carBAMazepine (TEGRETOL) 100 MG/5ML suspension 150 mg by Oral or Feeding Tube route 3 times daily At 06:00, 12:00, and 24:00 for seizures 1/14/2021 at 1200 Yes Unknown, Entered By History   clotrimazole-betamethasone (LOTRISONE) 1-0.05 % external cream Apply topically 2 times daily as needed   at prn Yes Leticia Santiago MD   ferrous sulfate 220 (44 Fe) MG/5ML ELIX 220 mg by Per Feeding Tube route daily @ 0900 1/14/2021 at Unknown time Yes Unknown, Entered By History   Guar Gum (FIBER MODULAR, NUTRISOURCE FIBER,) packet 1 packet by Per G Tube route daily 1/14/2021 at Unknown time Yes Starr Champion MD   hydrocortisone (CORTEF) 10 MG tablet 10 mg by Oral or FT or NG tube route daily (with dinner) At 1500 1/14/2021 at 1500 Yes Unknown, Entered By History   hydrocortisone (CORTEF) 10 MG tablet 20 mg by Oral or FT or NG tube route every morning At 0900 1/14/2021 at Unknown time Yes Unknown, Entered By History   hydrocortisone 1 % CREA cream Place rectally 2 times daily as needed for other Apply to reddened memo areas as needed  at prn Yes Unknown, Entered By History   levothyroxine (SYNTHROID/LEVOTHROID) 150 MCG tablet Take 150 mcg by mouth every morning At 0500 1/14/2021 at 0500 Yes Unknown, Entered By History   melatonin (MELATONIN) 1 MG/ML LIQD liquid 6 mg by Per NG tube route At Bedtime  1/13/2021 Yes Unknown, Entered By History   metoclopramide (REGLAN) 10 MG/10ML SOLN solution Take 10 mg by mouth 4 times daily (before meals and nightly) 0800, 1200, 1600, 2000  Disconnects bag before administration, then waits 45 mins before reconnecting after giving the medication 1/14/2021 at 2000 Yes Unknown, Entered By History   miconazole (MICATIN) 2 % AERP powder Apply topically 2 times daily as needed   at prn Yes Unknown, Entered By History   mupirocin (BACTROBAN) 2 % external ointment Apply  topically 2 times daily as needed   at prn Yes Reported, Patient   pantoprazole (PROTONIX) 2 mg/mL SUSP suspension 20 mLs (40 mg) by Per J Tube route daily 1/14/2021 at Unknown time Yes Alvaro Barahona MD   potassium & sodium phosphates (NEUTRA-PHOS) 280-160-250 MG Packet Take 1 packet by mouth 3 times daily  1/14/2021 at Unknown time Yes Yanely Liriano MD   potassium chloride (KLOR-CON) 20 MEQ packet 20 mEq by Oral or Feeding Tube route daily 1/14/2021 at Unknown time Yes Faizan Mclean MD   Scopolamine HBr POWD Dispense #90. Mix contents with small amount of water for admin via J-tube.  Administer 0.8 mg three times each day. 1/14/2021 at Unknown time Yes Jennie Bermudez MD   Skin Protectants, Misc. (BALMEX SKIN PROTECTANT) OINT Externally apply topically 2 times daily as needed (irritation) Applay to reddened memo areas twice daily as needed  at prn Yes Unknown, Entered By History   sodium bicarbonate 650 MG tablet Take 1 tablet (650 mg) by mouth daily 1/14/2021 at Unknown time Yes Ricky Torres MD   testosterone cypionate (DEPOTESTOTERONE CYPIONATE) 200 MG/ML injection Inject 76 mg into the muscle See Admin Instructions Every 2 weeks on Fridays  76 mg or 0.38 mL  Yes Unknown, Entered By History   vitamin C (ASCORBIC ACID) 1000 MG TABS 1,000 mg by Oral or Feeding Tube route daily  1/14/2021 at Unknown time Yes Unknown, Entered By History   vitamin D3 (CHOLECALCIFEROL) 2000 units (50 mcg) tablet Take 2,000 Units by mouth daily Crush and feed via j-tube @@ 0900 1/14/2021 at Unknown time Yes Unknown, Entered By History   prochlorperazine (COMPAZINE) 25 MG suppository Place 1 suppository (25 mg) rectally every 12 hours as needed for nausea or vomiting   Alvaro Barahona MD       The information provided in this note is only as accurate as the sources available at the time of the update(s).

## 2021-01-15 NOTE — PROGRESS NOTES
[facility] Nurse Inpatient Pressure Injury Assessment   Reason for consultation: Evaluate and treat coccyx      ASSESSMENT  Buttoc/coccyx-Pt does not currently have a pressure injury, pt has Moisture Associated Skin Damage (MASD)   Status: initial assessment Pt has chronic moisture irritation with no current open wounds. Requires strict repositioning and offloading    GJ Tube-Peritubular tissue is intact and free from erythema. Pt has mild hypergranulation tissue that may benefit treatment of Silver nitrate. Dr. Keita paged to discuss. If silver nitrate is ordered WOC to return early next week for tx.     Recommend provider order: Silver nitrate to GJ tube(see above notation)     TREATMENT PLAN  Coccyx/buttock wound: BID and PRN  1. After any incontinent episode cleanse with sarah cleanse and protect and robyn dry wipes/washcloths.   2. Ensure area is completely dry.  3. Apply thin layer of critic aid barrier paste. (Do not apply a lot as this leads to caking of the paste).   4. Remove only soiled paste, then reapply thin layer. If complete removal is needed use baby/mineral oil.   *Avoid pre moisten wipes.   *Avoid use of brief  *Use single covidien pad and limit number of linens underneath patient.     GJ Tube- Unit routine cares. If MD orderes silver nitrate to GJ tube woc plans to return early next week    Orders Written  WOC Nurse follow-up plan:If silver nitrate ordered 1/18 or 1/19, otherwise weekly  Nursing to notify the Provider(s) and re-consult the WOC Nurse if wound(s) deteriorates or new skin concern.    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     Objective Data  Containment of urine/stool: Incontinence Protocol  and External catheter    Current Diet/ Nutrition:  None      Output:   I/O last 3 completed shifts:  In: 100 [IV Piggyback:100]  Out: -     Risk Assessment:                            Labs:   Recent Labs   Lab 01/15/21  0116   ALBUMIN 3.2*   HGB 12.9*   WBC 16.2*       Physical Exam  Skin inspection: focused GJ tube and buttock/coccyx      Wound Location:  Buttock/Coccyx      Date of last Photo 1/15  Wound History: Pt with chronic moisture damage and irritation, no open areas. Pt with incontinence and requires assistance with all cares.  Measurements (length x width x depth, in cm) 12 cm x 8 cm  x  0 cm   Wound Base:  100 % Diffuse red blanchable erytema with dry patch with superficial peeling skin to Right buttock  Tunneling N/A  Undermining N/A  Palpation of the wound bed: normal   Periwound skin: intact  Color: normal and consistent with surrounding tissue  Temperature: normal   Drainage:, none  Description of drainage: none  Odor: none  Pain: absent, none     Wound Location:  GJ tube      Date of last Photo 1/15/21  Wound History: Pt with chronic hypergranulation to GJ peritube due to chronic moisture and friction. Awaiting order for silver nitrate if provider would like   Measurements (length x width x depth, in cm) circumferential and more prominent from 6-1 O'clock extends approx 0.5cm above skin level     Wound Base:  100 % hypergranulation  Periwound: Intact    Interventions  Current support surface: Standard  Atmos Air mattress  Current off-loading measures: Pillows  Repositioning aid: Pillows  Visual inspection of wound(s) completed   Tube Securement: condom cath secured at thigh  Wound Care: was done per plan of care.  Supplies: awaiting silver nitrate  Educated provided: importance of repositioning, plan of care, Moisture management and Off-loading pressure  Education provided to: nurse  Discussed importance of:repositioning every 2 hours, off-loading pressure to wound and moisture management  Discussed  plan of care with Nurse and Physician-paged to discuss silver nitrate.     Sammy Rubin RN CWOCN

## 2021-01-15 NOTE — ED TRIAGE NOTES
Comes in for fever reported by mother, reported fever of 100.3. 650mg tylenol given by mother. BP SBP in the 90s, tachycardiac, otherwise vss.

## 2021-01-15 NOTE — ED NOTES
Bed: ED02  Expected date:   Expected time:   Means of arrival:   Comments:  Kerri 1 60m fever eta 3

## 2021-01-15 NOTE — PROGRESS NOTES
RECEIVING UNIT ED HANDOFF REVIEW    ED Nurse Handoff Report was reviewed by: Lee Lopez RN on January 15, 2021 at 5:53 AM

## 2021-01-15 NOTE — CONSULTS
CLINICAL NUTRITION SERVICES  -  ASSESSMENT NOTE    RECOMMENDATIONS FOR MD/PROVIDER TO ORDER:   Add NeutraPhos TID - pt receives this at baseline.    Recommendations Ordered by Registered Dietitian (RD):   Start TF per home regimen -->   Isosource 1.5 @ 55 ml/hr x 24 (formulary equivalent to Jevity 1.5) via JTube  Provides 1320 mL provides 1980 kcal, 90 gm protein, 232 gm CHO, 20 gm fiber and 1003 mL H20.  + Nutrisource Fiber 1 pkt daily (15 kcal, 4 g fiber)   Flush 60 mL q 4 hours while IVF running. Recommend increase to 160 mL q 4 hours once IV off.     Total (TF + Fiber) = 1995 kcal (27 kcal/kg), 90 g protein (1.2 g/kg), 23 g fiber daily.     Daily electrolyte check, Mg and Phos add on  Daily weights  Start at 30 mL/hr, increase to goal of 55 mL/hr after 4 hours w/ good tolerance    Malnutrition:   % Weight Loss:  None noted  % Intake:  Decreased intake does not meet criteria for malnutrition   Subcutaneous Fat Loss:  Deferred - none suspected given wt stability and consistent nutrition intakes   Muscle Loss:  Deferred - none suspected given wt stability and consistent nutrition intakes   Fluid Retention:  None noted    Malnutrition Diagnosis: Unable to determine due to lack of physical exam.      REASON FOR ASSESSMENT  Keyon Farias is a 60 year old male seen by Registered Dietitian for Provider Order - Registered Dietitian to Assess and Order TF per Medical Nutrition Therapy Protocol    NUTRITION HISTORY  - Information obtained from chart review and patient's mother/caregiver Savannah.   - Patient admitted with fever and chills, r/t UTI.   - Gastrojejunostomy tube in place for reliance on enteral nutrition.     - Called patient's mother Savannah to verify home enteral regimen.   - Receives Jevity 1.5 @ 55 ml/hr x 24 hours (5.5 cans/day) + 1 Scoop Benefiber daily. Provides 1967 kcal, 83 gm protein and 990 mL free water.   - Also receives NeutraPhos TID  - He gets 1 L free water over the course of the day.   -  Savannah states that he has been stooling appropriately, typically 1x daily. Occasionally will miss a day.   - NKFA    CURRENT NUTRITION ORDERS  Diet Order:     NPO - baseline    Current Intake/Tolerance:  EN off since presentation.     NUTRITION FOCUSED PHYSICAL ASSESSMENT FOR DIAGNOSING MALNUTRITION)  No: Deferred    Obtained from Chart/Interdisciplinary Team:  Just admitted - no documentation of skin assessment     ANTHROPOMETRICS  Height: 6'  Weight: 164 lbs 12.8 oz (74.8 kg)   Body mass index is 22.3 kg/m .  Weight Status:  Normal BMI  IBW: 80.9 kg  % IBW: 92%  Weight History: weight is relatively stable.   Wt Readings from Last 10 Encounters:   01/15/21 74.8 kg (164 lb 12.8 oz)   11/04/20 71 kg (156 lb 8.4 oz)   10/25/20 68.9 kg (151 lb 12.8 oz)   10/23/20 72.6 kg (160 lb)   10/14/20 73.5 kg (162 lb)   09/25/20 73.7 kg (162 lb 6.4 oz)   08/22/20 69.9 kg (154 lb 3.2 oz)   08/15/20 74.2 kg (163 lb 9.3 oz)   05/17/20 67.2 kg (148 lb 2.4 oz)       LABS  Labs reviewed  Na 133, K 4.0 - NL    MEDICATIONS  Medications reviewed  LR @ 100 mL/hr     ASSESSED NUTRITION NEEDS PER APPROVED PRACTICE GUIDELINES:  Dosing Weight 74.8 kg   Estimated Energy Needs: 7681-9051+ kcals (20-25+ Kcal/Kg)  Justification: maintenance  Estimated Protein Needs:  grams protein (1.2-1.5 g pro/Kg)  Justification: maintenance  Estimated Fluid Needs: 1 mL/kcal, or per MD pending fluid status     MALNUTRITION:  % Weight Loss:  None noted  % Intake:  Decreased intake does not meet criteria for malnutrition   Subcutaneous Fat Loss:  Deferred - none suspected given wt stability and consistent nutrition intakes   Muscle Loss:  Deferred - none suspected given wt stability and consistent nutrition intakes   Fluid Retention:  None noted    Malnutrition Diagnosis: Unable to determine due to lack of physical exam.     NUTRITION DIAGNOSIS:  Inadequate protein-energy intake related to transfer of care as evidenced by consult received to restart TF.      NUTRITION INTERVENTIONS  Recommendations / Nutrition Prescription  Start TF per home regimen -->   Isosource 1.5 @ 55 ml/hr x 24 (formulary equivalent to Jevity 1.5) via JTube  Provides 1320 mL provides 1980 kcal, 90 gm protein, 232 gm CHO, 20 gm fiber and 1003 mL H20.  + Nutrisource Fiber 1 pkt daily (15 kcal, 4 g fiber)   Flush 60 mL q 4 hours while IVF running. Recommend increase to 160 mL q 4 hours once IV off.     Total (TF + Fiber) = 1995 kcal (27 kcal/kg), 90 g protein (1.2 g/kg), 23 g fiber daily.     Daily electrolyte check, Mg and Phos add on  Daily weights  Start at 30 mL/hr, increase to goal of 55 mL/hr after 4 hours w/ good tolerance     Implementation  Nutrition education: Per Provider order if indicated   EN Composition, EN Schedule and Feeding Tube Flush: as above      Nutrition Goals  TF @ goal to provide % estimated needs .      MONITORING AND EVALUATION:  Progress towards goals will be monitored and evaluated per protocol and Practice Guidelines    Marisel Fernández RD, LD  [facility] [facility], [facility]   Pager: [phone]  Weekend Pager: [phone]
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
## 2021-01-15 NOTE — ED PROVIDER NOTES
History     Chief Complaint:  Fever     The history is provided by the EMS personnel. History limited by: nonverbal.      Keyon Farias is a 60 year old male with history of TBI, DVT, ventricular fibrillation and cardiac arrest, severe sepsis, necrotizing pancreatitis, aspiration pneumonia, who presents for evaluation of a fever. His mother reports that he developed a fever earlier tonight around 7pm and she treated him with tylenol 650mg. At around midnight, he developed recurrence of his fever and she again treated him with 650 mg of Tylenol.  He subsequently vomited.  She called EMS and they brought him to the emergency department.    Review of Systems   Unable to perform ROS: Patient nonverbal     Allergies:  Valproic Acid  Dilantin   Scopolamine    Medications:  acetylcysteine 20 % neb solution  albuterol  aspirin   Brivaracetam  Carbamazepine   levothyroxine  melatonin   metoclopramide   Miconazole 2 % powder  pantoprazole  potassium & sodium phosphates   potassium chloride   prochlorperazine  Scopolamine   testosterone cypionate     Past Medical History:    Aphasia  DVT   GERD   Panhypopituitarism    Pneumonia    Septic shock    Spastic hemiplegia affecting dominant side    Thyroid disease   Traumatic brain injury   Cerebral artery occlusion with cerebral infarction   UTI   Ventricular fibrillation   Ventricular tachyarrhythmia  Transient alteration of awareness  Aspiration pneumonia   SIRS  Severe sepsis   Encephalopathy  Necrotizing pancreatitis  Cardiac arrest   Epilepsy    Past Surgical History:    Endoscopic US upper GI   Endoscopic US, esophagoscopy, gastroscopy, duodenoscopy, necronectomy  EGD  Head/neck surgery - reconstructive   IR gastro jejunostomy  IR PICC  Appendectomy  Assisted insertion tube gastrotomy  Right hand repair   Tracheostomy   Vascular surgery     Family History:    Father: cancer    Social History:  The patient was accompanied to the ED by EMS.  PCP: Carlos Gomez     Physical  "Exam     Patient Vitals for the past 24 hrs:   BP Temp Temp src Pulse Resp SpO2 Height Weight   01/15/21 0345 90/55 -- -- 107 -- 100 % -- --   01/15/21 0330 92/47 -- -- 108 -- 100 % -- --   01/15/21 0315 100/80 -- -- 105 16 100 % -- --   01/15/21 0300 113/65 -- -- 114 18 100 % -- --   01/15/21 0230 94/63 -- -- 95 27 100 % -- --   01/15/21 0220 90/62 -- -- 103 12 100 % -- --   01/15/21 0200 (!) 77/54 -- -- 103 -- -- -- --   01/15/21 0140 99/62 -- -- 106 -- 94 % -- --   01/15/21 0120 95/60 -- -- 111 -- 90 % -- --   01/15/21 0115 93/57 -- -- 102 -- (!) 89 % -- --   01/15/21 0106 97/61 99.4  F (37.4  C) Oral 125 16 93 % 1.803 m (5' 11\") 77.1 kg (170 lb)     Physical Exam  General: Well-nourished, nonverbal  Eyes: PERRL, conjunctivae pink no scleral icterus or conjunctival injection  ENT:  Moist mucus membranes, posterior oropharynx clear without erythema or exudates  Respiratory:  Lungs with transmitted upper airway sounds, no rubs or wheezes.   Good air movement.  No retractions or respiratory distress.  CV: Mildly tachycardic rate and rhythm, no murmurs/rubs/gallops  GI:  Abdomen soft and non-distended.  Normoactive BS.  No tenderness, guarding or rebound  Skin: Warm, dry.  No rashes or petechiae  Musculoskeletal: No peripheral edema or calf tenderness  Neuro: Nonverbal   psychiatric: Unable to assess      Emergency Department Course     ECG:  ECG taken at 0206  Sinus tachycardia  Left anterior fascicular block  Abnormal ECG  Rate 105 bpm. RI interval 144 ms. QRS duration 84 ms. QT/QTc 364/481 ms. P-R-T axes 55 -46 38.     Imaging:    CT Chest/Abdomen/Pelvis w Contrast  1.  Mild hazy infiltrate right lower lobe suspicious for a mild or low grade pneumonitis.  2.  Diffuse bladder wall thickening could be seen with cystitis. Clinical correlation.  3.  2.4 cm cyst in the tail of the pancreas is smaller than on the most recent exam and has been present on multiple prior exams. This may represent a chronic pseudocyst.  4.  " No significant findings elsewhere.  Reading per radiology     XR Chest Port 1 View  Low lung volumes and mild bibasilar atelectasis. Chronic elevation of the right hemidiaphragm. Normal heart size and pulmonary vascularity. No significant pleural fluid. No pneumothorax.  Reading per radiology     Laboratory:    Symptomatic Influenza A/B & SARS-CoV2 (COVID-19) Virus PCR Multiplex: Negative      UA: pH 7.5 (H), Protein Albumin 30 (A), Urobilinogen 4.0 (H), Leukocyte Esterase large (A), WBC >182 (H), RBC 16 (H), WBC Clumps present (A), Bacteria many (A), Mucous present (A), Amorphous Crystals few (A), o/w WNL.  Urine Culture: Pending    CBC: WBC 16.2 (H), HGB 12.9 (L),  (L)  CMP: Albumin 3.2 (L), o/w WNL (Creatinine 0.86)    Lactic Acid (Resulted: 0143): 1.8     Lipase: 153    Blood Gas (Collected 0132): pH 7.47 (H), PCO2 43, PO2 92 (H), Bicarbonate 31 (H), Base Excess Venous 6.9, FIO2 2 L     Blood Culture x2: Pending     Emergency Department Course:    Reviewed:    I reviewed the patient's nursing notes, vitals, past medical records, Care Everywhere.     Assessments:    0124 I performed an exam of the patient as documented above.     0154 I spoke to Savannah, the patient's guardian, regarding the patient's plan of care and to obtain further history.     Consults:     0402 I spoke with Dr. Keita of the hospitalist service from [facility] regarding patient's presentation, findings, and plan of care.     Interventions:  0205 NS 1000 mL IV   0215 Zosyn 4.5 g IV     Disposition:  The patient was admitted to the hospital under the care of Dr. Claros.     Impression & Plan      Covid-19  Keyon Farias was evaluated during a global COVID-19 pandemic, which necessitated consideration that the patient might be at risk for infection with the SARS-CoV-2 virus that causes COVID-19.   Applicable protocols for evaluation were followed during the patient's care.   COVID-19 was considered as part of the patient's  evaluation. The plan for testing is:  a test was obtained during this visit.    Medical Decision Making:  Keyon Farias is a 60 year old male who presents to the emergency department today for evaluation of fever and vomiting.  He is nonverbal and unable to give history secondary to traumatic brain injury.  Medical evaluations concerning for urinary tract infection as well as the possibility of an aspiration pneumonitis.  He was treated with broad-spectrum antibiotics after cultures were obtained.  His blood pressures have remained stable.  Lactic acid was normal.  He has not had recurrent vomiting.  Given his underlying comorbidities, he is quite high risk for being discharged home.  He will be admitted to the hospital service under the care of Dr. Keita.  I updated his mother and she was in agreement with the plan as well.    Diagnosis:    ICD-10-CM    1. Fever and chills  R50.9    2. Urinary tract infection without hematuria, site unspecified  N39.0    3. Aspiration pneumonitis (H)  J69.0    4. Nausea and vomiting, intractability of vomiting not specified, unspecified vomiting type  R11.2      Scribe Disclosure:  I, Orla Severson, am serving as a scribe at 1:15 AM on 1/15/2021 to document services personally performed by Germaine Green MD based on my observations and the provider's statements to me.     [facility] EMERGENCY DEPT         Germaine Green MD  01/15/21 1383

## 2021-01-15 NOTE — PROGRESS NOTES
Brief, no charge note    Patient admitted by my colleague DR Keita early this morning    Keyon Farias is a 60 year old male with TBI with spastic hemiplegia and dysphagia s/p PEG, nonverbal, seizure disorder, panhypopituitarism, GERD, hx recurrent aspiration pneumonia with sepsis (>5 hospitalizations for this since August 2020) who was admitted on 1/15/2021 with sepsis and UTI, presented with fever and vomiting.     - CT CAP noted with mild hazy infiltrate right lower lobe suspicious for a mild or low grade pneumonitis, diffuse bladder wall thickening could be seen with cystitis, also noted likely chronic pseudocyst.  - T max 100.5, wbc 16.2, normal lactate (1.8), tachy to 110s with soft BP 90s/50s  - COVID/Influenza negative  - Blood and urine culture pending  - continue LR at 100 ml/hr  - continue empiric Rocephin    PTA medications reconciled and resumed including the seizure meds, hydrocortisone, nebs and mucomyst

## 2021-01-15 NOTE — CONSULTS
"Care Management Initial Consult    General Information  Assessment completed with:  Chart review & mom (guardian Savannah)  Primary Care Provider verified and updated as needed:   Yes, Dr. Gomez [facility] [phone]  Readmission within the last 30 days:        Reason for Consult: discharge planning  Advance Care Planning: Advance Care Planning Reviewed: present on chart  Guardianship documents active and scanned in to EPIC       Communication Assessment  Patient's communication style: spoken language (English or Bilingual)     Wear Glasses or Blind: no     Cognitive  Cognitive/Neuro/Behavioral: .WDL except  Level of Consciousness: alert  Arousal Level: opens eyes spontaneously  Orientation: disoriented to, situation, time  Mood/Behavior: calm, cooperative  Best Language: 2 - Severe aphasia  Speech: incoherent     Living Environment:   People in home: parent(s), other (see comments)(home care agency / PCA )  Savannah (mother)  Current living Arrangements: house      Able to return to prior arrangements:  yes      Family/Social Support:  Care provided by: homecare agency, parent(s)  Provides care for:  N/A  Marital Status: Single  Guardian, PCA, Parent(s)          Description of Support System:   Mother and PCA   Support Assessment: Very supportive to the best of their ability     Current Resources:   Skilled Home Care Services: Skilled Nursing  Community Resources: County Programs, County Worker, Housekeeping/Chore Agency, PCA  Equipment currently used at home: wheelchair, power, hospital bed, lift device  Supplies currently used at home: Diabetic Supplies, Incontinence Supplies, Enteral Nutrition & Supplies  Agency information   [facility] (phone [phone], fax [phone])  12 hours RN coverage daily   [facility] (per note \"currently 80.5 hours every 7 days\")   12 hours PCA evening / night   [facility] (phone [phone])  Also supply nursing   [facility]   For  " supplies  Legal Guardian  MotherSavannah  Transportation   Stretcher rides via [employer] (phone [phone], fax [phone])  ramp available [place] [address] [address]     Employment/Financial:  Employment Status: disabled     Employment/ Comments: [employer] (disabled since )  Financial Concerns: No concerns identified   Finance Comments: active MEDICARE & MEDICAID,  in-home nursing 12 hrs/day via CADI waiver and 12 hours of PCA services, homemaking services      Lifestyle & Psychosocial Needs:        Socioeconomic History     Marital status: Single       Spouse name: Not on file     Number of children: Not on file     Years of education: Not on file     Highest education level: Not on file                  Tobacco Use     Smoking status: Former Smoker       Quit date: 1989       Years since quittin.6     Smokeless tobacco: Never Used   Substance and Sexual Activity     Alcohol use: No     Drug use: No     Sexual activity: Never            Functional Status:  Prior to admission patient needed assistance:   Dependent ADLs:: Bathing, Dressing, Eating, Grooming  Dependent IADLs:: Cleaning, Cooking, Laundry, Shopping, Meal Preparation, Medication Management, Money Management, Transportation  Assesssment of Functional Status: At functional baseline     Mental Health Status:  Mental Health Status: No Current Concerns        Chemical Dependency Status:  Chemical Dependency Status: No Current Concerns        Values/Beliefs:  Spiritual, Cultural Beliefs, Sikh Practices, Values that affect care:            Values/Beliefs Comment: Shinto     Additional Information:  Patients mother states some of the PCA help has been decreased since COVID due to  Staff availability. Savannah stating she is able to manage with the PCA help that she has at home. Savannah states patient's mobility has increased and patient able to assist with bearing weight for transfers. Discussed if  patient would benefit from Catarina lift and Savannah declined.  Savannah would like transportation arranged at discharge.  Anita Casillas RN  Inpatient Care Coordinator  [facility]  #[phone]

## 2021-01-15 NOTE — PLAN OF CARE
Summary:     DATE & TIME: 1/15/21 6264-4721  Cognitive Concerns/ Orientation : Alert, non verbal.  Hx TBI   BEHAVIOR & AGGRESSION TOOL COLOR: Green   CIWA SCORE: NA   ABNL VS/O2: VSS except for Temp 99.8Ax    MOBILITY: A2, Total care.   PAIN MANAGMENT: No signs or symptoms of pain.    DIET: NPO tube feeding begin at 1200  BOWEL/BLADDER: Incontinent, condom catheter in place (replaced x1)  ABNL LAB/BG: UA pos  DRAIN/DEVICES: PIV, GJ tube and drain.    TELEMETRY RHYTHM: N/A  SKIN: Intact, blanchable redness memo-rectal area; seen by WOC; scant drainage around GJ tube. No erythema.  TESTS/PROCEDURES: N/A  D/C DAY/GOALS/PLACE: Pending cultures  OTHER IMPORTANT INFO: Lungs are diminished. Total care, turn and repo q 2.  Contact for MRSA and VRE.

## 2021-01-15 NOTE — ED NOTES
[facility]  ED Nurse Handoff Report    ED Chief complaint: Fever      ED Diagnosis:   Final diagnoses:   None       Code Status: to be addressed by admitting provider    Allergies:   Allergies   Allergen Reactions     Valproic Acid Other (See Comments)     Toxicity w/ bone marrow suspension, elevated ammonia levels      Dilantin [Phenytoin Sodium] Other (See Comments)     Severe Trembling     Scopolamine Hives     Hives with the patch - oral no problem       Patient Story: Lives with mom at home. Came in for fever of 100.3. Hx of tbi, stroke, and non-verbal.    Focused Assessment:  Non-verbal, unable to ask orientation questions. O2 89-90% on RA, 2L NC applied. Fever of 99.4 in ED, mom gave tylenol 650mg before EMS arrived. Soft Bps, 90s/60s, tachycardiac, 100% on 2L. Dependent for cares, and ambulation. Calm, cooperative, resting on cart.    Labs Ordered and Resulted from Time of ED Arrival Up to the Time of Departure from the ED   CBC WITH PLATELETS DIFFERENTIAL - Abnormal; Notable for the following components:       Result Value    WBC 16.2 (*)     Hemoglobin 12.9 (*)     Hematocrit 39.2 (*)     Platelet Count 144 (*)     Absolute Neutrophil 10.9 (*)     Absolute Monocytes 1.5 (*)     All other components within normal limits   BLOOD GAS VENOUS - Abnormal; Notable for the following components:    Ph Venous 7.47 (*)     PO2 Venous 92 (*)     Bicarbonate Venous 31 (*)     All other components within normal limits   ROUTINE UA WITH MICROSCOPIC - Abnormal; Notable for the following components:    pH Urine 7.5 (*)     Protein Albumin Urine 30 (*)     Urobilinogen mg/dL 4.0 (*)     Leukocyte Esterase Urine Large (*)     WBC Urine >182 (*)     RBC Urine 16 (*)     WBC Clumps Present (*)     Bacteria Urine Many (*)     Mucous Urine Present (*)     Amorphous Crystals Few (*)     All other components within normal limits   COMPREHENSIVE METABOLIC PANEL - Abnormal; Notable for the following components:     Albumin 3.2 (*)     All other components within normal limits   LACTIC ACID WHOLE BLOOD   INFLUENZA A/B & SARS-COV2 PCR MULTIPLEX   LIPASE   PULSE OXIMETRY NURSING   CARDIAC CONTINUOUS MONITORING   STRICT INTAKE AND OUTPUT   PERIPHERAL IV CATHETER   VITAL SIGNS   BLOOD CULTURE   BLOOD CULTURE   URINE CULTURE AEROBIC BACTERIAL     XR Chest Port 1 View   Final Result   IMPRESSION: Low lung volumes and mild bibasilar atelectasis. Chronic elevation of the right hemidiaphragm. Normal heart size and pulmonary vascularity. No significant pleural fluid. No pneumothorax.      CT Chest/Abdomen/Pelvis w Contrast    (Results Pending)       Treatments and/or interventions provided:     Medications   sodium chloride (PF) 0.9% PF flush 3 mL (has no administration in time range)   sodium chloride (PF) 0.9% PF flush 3 mL (has no administration in time range)   piperacillin-tazobactam (ZOSYN) 4.5 g vial to attach to  mL bag (4.5 g Intravenous New Bag 1/15/21 0215)   0.9% sodium chloride BOLUS (1,000 mLs Intravenous New Bag 1/15/21 0205)     Patient's response to treatments and/or interventions: improved BP    To be done/followed up on inpatient unit: Continue with plan of care per admitting MD.    Does this patient have any cognitive concerns?: TBI, non verbal    Activity level - Baseline/Home:  Total Care  Activity Level - Current:   Total Care    Patient's Preferred language: English   Needed?: No    Isolation: Contact  Infection: MRSA  VRE  Patient tested for COVID 19 prior to admission: YES, negative  Bariatric?: No    Vital Signs:   Vitals:    01/15/21 0120 01/15/21 0140 01/15/21 0200 01/15/21 0220   BP: 95/60 99/62 (!) 77/54 90/62   Pulse: 111 106 103 103   Resp:    12   Temp:       TempSrc:       SpO2: 90% 94%  100%   Weight:       Height:           Cardiac Rhythm: ST    Was the PSS-3 completed:   No -unable to complete  What interventions are required if any?               Family Comments: mom updated  OBS  brochure/video discussed/provided to patient/family: N/A              For the majority of the shift this patient's behavior was Green.   Behavioral interventions performed were NA.    ED NURSE PHONE NUMBER: *[phone]

## 2021-01-16 LAB
ANION GAP SERPL CALCULATED.3IONS-SCNC: 4 MMOL/L (ref 3–14)
BACTERIA SPEC CULT: ABNORMAL
BUN SERPL-MCNC: 12 MG/DL (ref 7–30)
CALCIUM SERPL-MCNC: 9 MG/DL (ref 8.5–10.1)
CHLORIDE SERPL-SCNC: 109 MMOL/L (ref 94–109)
CO2 SERPL-SCNC: 26 MMOL/L (ref 20–32)
CREAT SERPL-MCNC: 0.98 MG/DL (ref 0.66–1.25)
ERYTHROCYTE [DISTWIDTH] IN BLOOD BY AUTOMATED COUNT: 14.4 % (ref 10–15)
GFR SERPL CREATININE-BSD FRML MDRD: 85 ML/MIN/{1.73_M2}
GLUCOSE SERPL-MCNC: 200 MG/DL (ref 70–99)
HCT VFR BLD AUTO: 38.2 % (ref 40–53)
HGB BLD-MCNC: 12.1 G/DL (ref 13.3–17.7)
Lab: ABNORMAL
MCH RBC QN AUTO: 29.1 PG (ref 26.5–33)
MCHC RBC AUTO-ENTMCNC: 31.7 G/DL (ref 31.5–36.5)
MCV RBC AUTO: 92 FL (ref 78–100)
PLATELET # BLD AUTO: 151 10E9/L (ref 150–450)
POTASSIUM SERPL-SCNC: 4.5 MMOL/L (ref 3.4–5.3)
RBC # BLD AUTO: 4.16 10E12/L (ref 4.4–5.9)
SODIUM SERPL-SCNC: 139 MMOL/L (ref 133–144)
SPECIMEN SOURCE: ABNORMAL
WBC # BLD AUTO: 13.2 10E9/L (ref 4–11)

## 2021-01-16 PROCEDURE — 85027 COMPLETE CBC AUTOMATED: CPT | Performed by: INTERNAL MEDICINE

## 2021-01-16 PROCEDURE — 99232 SBSQ HOSP IP/OBS MODERATE 35: CPT | Performed by: HOSPITALIST

## 2021-01-16 PROCEDURE — 80048 BASIC METABOLIC PNL TOTAL CA: CPT | Performed by: INTERNAL MEDICINE

## 2021-01-16 PROCEDURE — 250N000013 HC RX MED GY IP 250 OP 250 PS 637: Performed by: HOSPITALIST

## 2021-01-16 PROCEDURE — 250N000013 HC RX MED GY IP 250 OP 250 PS 637: Performed by: INTERNAL MEDICINE

## 2021-01-16 PROCEDURE — 120N000001 HC R&B MED SURG/OB

## 2021-01-16 PROCEDURE — 250N000009 HC RX 250

## 2021-01-16 PROCEDURE — 94640 AIRWAY INHALATION TREATMENT: CPT | Mod: 76,ZZRT

## 2021-01-16 PROCEDURE — 250N000013 HC RX MED GY IP 250 OP 250 PS 637

## 2021-01-16 PROCEDURE — 999N000157 HC STATISTIC RCP TIME EA 10 MIN

## 2021-01-16 PROCEDURE — 94640 AIRWAY INHALATION TREATMENT: CPT

## 2021-01-16 PROCEDURE — 250N000009 HC RX 250: Performed by: HOSPITALIST

## 2021-01-16 PROCEDURE — 272N000078 HC NUTRITION PRODUCT INTERMEDIATE LITER

## 2021-01-16 PROCEDURE — 36415 COLL VENOUS BLD VENIPUNCTURE: CPT | Performed by: INTERNAL MEDICINE

## 2021-01-16 PROCEDURE — 250N000011 HC RX IP 250 OP 636: Performed by: INTERNAL MEDICINE

## 2021-01-16 PROCEDURE — 258N000003 HC RX IP 258 OP 636: Performed by: HOSPITALIST

## 2021-01-16 RX ORDER — SODIUM CHLORIDE 9 MG/ML
INJECTION, SOLUTION INTRAVENOUS CONTINUOUS
Status: DISCONTINUED | OUTPATIENT
Start: 2021-01-16 | End: 2021-01-17

## 2021-01-16 RX ADMIN — METOCLOPRAMIDE HYDROCHLORIDE 10 MG: 5 SOLUTION ORAL at 12:08

## 2021-01-16 RX ADMIN — ACETYLCYSTEINE 2 ML: 200 SOLUTION ORAL; RESPIRATORY (INHALATION) at 08:28

## 2021-01-16 RX ADMIN — ALBUTEROL SULFATE 2.5 MG: 2.5 SOLUTION RESPIRATORY (INHALATION) at 08:25

## 2021-01-16 RX ADMIN — ACETYLCYSTEINE 2 ML: 200 SOLUTION ORAL; RESPIRATORY (INHALATION) at 19:47

## 2021-01-16 RX ADMIN — BRIVARACETAM 100 MG: 10 SOLUTION ORAL at 10:19

## 2021-01-16 RX ADMIN — CARBAMAZEPINE 150 MG: 100 SUSPENSION ORAL at 00:41

## 2021-01-16 RX ADMIN — POTASSIUM & SODIUM PHOSPHATES POWDER PACK 280-160-250 MG 1 PACKET: 280-160-250 PACK at 08:29

## 2021-01-16 RX ADMIN — CARBAMAZEPINE 100 MG: 100 SUSPENSION ORAL at 18:43

## 2021-01-16 RX ADMIN — CEFTRIAXONE SODIUM 1 G: 1 INJECTION, POWDER, FOR SOLUTION INTRAMUSCULAR; INTRAVENOUS at 08:33

## 2021-01-16 RX ADMIN — SODIUM CHLORIDE: 9 INJECTION, SOLUTION INTRAVENOUS at 15:09

## 2021-01-16 RX ADMIN — HYDROCORTISONE 20 MG: 20 TABLET ORAL at 08:29

## 2021-01-16 RX ADMIN — HYDROCORTISONE 10 MG: 10 TABLET ORAL at 15:47

## 2021-01-16 RX ADMIN — ALBUTEROL SULFATE 2.5 MG: 2.5 SOLUTION RESPIRATORY (INHALATION) at 23:18

## 2021-01-16 RX ADMIN — SODIUM BICARBONATE 650 MG TABLET 650 MG: at 08:29

## 2021-01-16 RX ADMIN — ACETYLCYSTEINE 4 ML: 200 SOLUTION ORAL; RESPIRATORY (INHALATION) at 11:59

## 2021-01-16 RX ADMIN — BRIVARACETAM 100 MG: 10 SOLUTION ORAL at 21:05

## 2021-01-16 RX ADMIN — POTASSIUM & SODIUM PHOSPHATES POWDER PACK 280-160-250 MG 1 PACKET: 280-160-250 PACK at 21:05

## 2021-01-16 RX ADMIN — CARBAMAZEPINE 150 MG: 100 SUSPENSION ORAL at 15:09

## 2021-01-16 RX ADMIN — ASPIRIN 81 MG CHEWABLE TABLET 81 MG: 81 TABLET CHEWABLE at 08:29

## 2021-01-16 RX ADMIN — CARBAMAZEPINE 150 MG: 100 SUSPENSION ORAL at 06:22

## 2021-01-16 RX ADMIN — POTASSIUM & SODIUM PHOSPHATES POWDER PACK 280-160-250 MG 1 PACKET: 280-160-250 PACK at 15:47

## 2021-01-16 RX ADMIN — METOCLOPRAMIDE HYDROCHLORIDE 10 MG: 5 SOLUTION ORAL at 08:28

## 2021-01-16 RX ADMIN — METOCLOPRAMIDE HYDROCHLORIDE 10 MG: 5 SOLUTION ORAL at 15:47

## 2021-01-16 RX ADMIN — Medication 1 PACKET: at 12:08

## 2021-01-16 RX ADMIN — POTASSIUM CHLORIDE 20 MEQ: 1.5 POWDER, FOR SOLUTION ORAL at 08:29

## 2021-01-16 RX ADMIN — PANTOPRAZOLE SODIUM 40 MG: 40 TABLET, DELAYED RELEASE ORAL at 08:28

## 2021-01-16 RX ADMIN — METOCLOPRAMIDE HYDROCHLORIDE 10 MG: 5 SOLUTION ORAL at 20:28

## 2021-01-16 RX ADMIN — ALBUTEROL SULFATE 2.5 MG: 2.5 SOLUTION RESPIRATORY (INHALATION) at 19:47

## 2021-01-16 RX ADMIN — LEVOTHYROXINE SODIUM 150 MCG: 75 TABLET ORAL at 06:22

## 2021-01-16 RX ADMIN — ALBUTEROL SULFATE 2.5 MG: 2.5 SOLUTION RESPIRATORY (INHALATION) at 11:49

## 2021-01-16 ASSESSMENT — ACTIVITIES OF DAILY LIVING (ADL)
ADLS_ACUITY_SCORE: 40
ADLS_ACUITY_SCORE: 43
ADLS_ACUITY_SCORE: 40
ADLS_ACUITY_SCORE: 43
ADLS_ACUITY_SCORE: 40
ADLS_ACUITY_SCORE: 43

## 2021-01-16 ASSESSMENT — MIFFLIN-ST. JEOR: SCORE: 1565.13

## 2021-01-16 NOTE — PROGRESS NOTES
[facility]  Hospitalist Progress Note        Helio Galan MD   01/16/2021        Interval History:      - no acute issues overnight; fever and leukocytosis trending down  - Urine culture growing Klebsiella, pansensitive         Assessment and Plan:        Keyon Farias is a 60 year old male with TBI with spastic hemiplegia and dysphagia s/p PEG, nonverbal, seizure disorder, panhypopituitarism, GERD, hx recurrent aspiration pneumonia with sepsis (>5 hospitalizations for this since August 2020) who was admitted on 1/15/2021 with sepsis and UTI, presented with fever and vomiting.      Sepsis secondary to Klebsiella UTI  H/o VRE and MRSA infections  - CT CAP noted with mild hazy infiltrate right lower lobe suspicious for a mild or low grade pneumonitis, diffuse bladder wall thickening could be seen with cystitis, also noted likely chronic pseudocyst.  - on presentation T max 100.5, wbc 16.2, normal lactate (1.8), tachy to 110s with soft BP 90s/50s; COVID/Influenza negative  - fever trended down, wbc 16---12  - Urine culture growing >100K Klebsiella, pnasensitive; Blood cultures with no growth so far  - Continue Rocephin, can probably transition to PO on 1/17 if clinically stable and remains afebrile  - hypotension improved but with intermittent tachycardia; will resume IVF with NS at 50 ml/hr     H/o recurrent aspiration with pneumonitis  Chronic pulmonary aspiration  Chest CT with mild hazy opacity of right lower lobe, similar to prior imaging. He lives at risk for pneumonitis and aspiration every day.    -supplemental oxygen as needed  -aspiration precautions  -continue PTA mucomyst, albuterol nebs      TBI with spastic hemiplegia  Epilepsy  Panhypopituitarism  [hydrocortisone, levothyroxine]  -continue PTA medications including Tegretol, Hydrocortisone, synthroid      Dysphagia with PEG  Feeding tube dependent   -nutrition following for continued tube feeds     H/o coccygeal pressure  "ulcer  -WOC following     Chronic, stable problems:  GERD: continue PTA pantoprazole        Ruled Out of COVID-19 infection => negative PCR test 1/15  Low suspicion for infection. He has had at least 10 prior negative COVID-19 tests.      DVT prophylaxis: Pneumatic Compression Devices  Code Status:  Full     Disposition: Expected discharge in 1-2 days; -SW following for disposition, has home cares/PCA, lives with his mum; non verbal at baseline    Care plan discussed with patient's mum Savannah over the phone                   Physical Exam:      Blood pressure 104/69, pulse 68, temperature 99.8  F (37.7  C), temperature source Axillary, resp. rate 22, height 1.803 m (5' 11\"), weight 72.3 kg (159 lb 6.3 oz), SpO2 97 %.  Vitals:    01/15/21 0106 01/15/21 0620 01/16/21 0614   Weight: 77.1 kg (170 lb) 74.8 kg (164 lb 12.8 oz) 72.3 kg (159 lb 6.3 oz)     Vital Signs with Ranges  Temp:  [97.9  F (36.6  C)-99.8  F (37.7  C)] 99.8  F (37.7  C)  Pulse:  [] 68  Resp:  [18-24] 22  BP: (101-130)/(68-73) 104/69  SpO2:  [94 %-97 %] 97 %  I/O's Last 24 hours  I/O last 3 completed shifts:  In: 2127 [I.V.:606; NG/GT:1190]  Out: 2400 [Urine:1900; Emesis/NG output:500]    Constitutional: conscious, alert, awake, mostly nonverbal but follows simple commands and interacts minimally with yes, no, head nodding; somnolent with sick looking appearance; resting comfortably in no apparent distress   HEENT: Pupils equal and reactive to light and accomodation,   Oral cavity: Moist mucosa   Cardiovascular: Normal s1 s2, regular rate and rhythm, no murmur   Lungs: B/l clear to auscultation, no wheezes or crepitations   Abdomen: Soft, nontender, nondistended.  A feeding tube is in place.  No guarding, rigidity or rebound tenderness   LE : No edema   Musculoskeletal: He has spastic hemiplegia on the right and is mostly nonverbal   Neuro:    Psychiatry: normal mood and affect                Medications:          acetylcysteine  2 mL " Nebulization 4x Daily     albuterol  2.5 mg Nebulization Q4H While awake     aspirin  81 mg Oral or Feeding Tube Daily     Brivaracetam  100 mg Oral or Feeding Tube BID     carBAMazepine  100 mg Oral Daily     carBAMazepine  150 mg Oral or Feeding Tube TID     cefTRIAXone  1 g Intravenous Q24H     fiber modular (NUTRISOURCE FIBER)  1 packet Per Feeding Tube Daily     hydrocortisone  10 mg Oral or Feeding Tube Daily with supper     hydrocortisone  20 mg Oral or Feeding Tube QAM     levothyroxine  150 mcg Oral QAM     metoclopramide  10 mg Oral 4x Daily AC & HS     pantoprazole  40 mg Per J Tube Daily     potassium & sodium phosphates  1 packet Oral TID     potassium chloride  20 mEq Oral or Feeding Tube Daily     sodium bicarbonate  650 mg Oral Daily     sodium chloride (PF)  3 mL Intracatheter Q8H     sodium chloride (PF)  3 mL Intracatheter Q8H     PRN Meds: acetaminophen, acetaminophen, bisacodyl, dextrose, lidocaine 4%, lidocaine (buffered or not buffered), ondansetron **OR** ondansetron, polyethylene glycol, senna-docusate **OR** senna-docusate, sodium chloride (PF), sodium chloride (PF)         Data:      All new lab and imaging data was reviewed.   Recent Labs   Lab Test 01/15/21  0116 12/19/20  0430 12/18/20  1656 10/26/20  0400 10/26/20  0400 10/25/20  1345 10/25/20  0742   WBC 16.2* 9.1 9.6   < > 17.0* 27.2* 23.1*   HGB 12.9* 12.3* 13.3   < > 9.8* 11.7* 12.6*   MCV 89 88 87   < > 89 91 90   * 205 213   < > 147* 224 220   INR  --   --   --   --  1.82* 1.86* 1.89*    < > = values in this interval not displayed.      Recent Labs   Lab Test 01/15/21  0734 01/15/21  0116 12/20/20  0520 12/19/20  1735 12/19/20  0430 12/18/20  1656   NA  --  133 135 134 132* 132*   POTASSIUM 3.9 4.0  --   --  3.6 3.0*   CHLORIDE  --  98  --   --  97 89*   CO2  --  29  --   --  31 38*   BUN  --  22  --   --  18 19   CR  --  0.86  --   --  0.80 0.86   ANIONGAP  --  6  --   --  4 5   STEVE  --  8.5  --   --  8.6 9.2   GLC  --   94  --   --  95 87     Recent Labs   Lab Test 10/25/20  2100 10/25/20  1345 10/25/20  0742   TROPI 0.047* 0.133* 0.095*

## 2021-01-16 NOTE — PLAN OF CARE
/1621 07-19  Cognitive Concerns/ Orientation : Alert, non-verbal. Hx TBI. Pt has legal guardian   BEHAVIOR & AGGRESSION TOOL COLOR: Green   CIWA SCORE: NA   ABNL VS/O2: VSS on 2l, sats couldn't maintain greater than 90%, now 95%  MOBILITY: Total, T&R, A x2, lift   PAIN MANAGMENT: Absence of nonverbal pain indicators   DIET: NPO, TF running at goal rate of 55mL/hr with 160mL q4h flushes   BOWEL/BLADDER: Incontinent of B/B, condom cath replaced, had 2 large loose tan stools.  ABNL LAB/BG: UA pos  DRAIN/DEVICES: R arm PIV  NS at 50cc/hr., GJ tube (G gravity drain output, J tube infusing TF)  TELEMETRY RHYTHM: N/A  SKIN: Blanchable redness to coccyx and groin. GJ tube WNL ex slight drainage. WOC orders   TESTS/PROCEDURES: N/A  D/C DAY/GOALS/PLACE: Pending improvement   OTHER IMPORTANT INFO: Congested cough, non productive, used oral suction x2 for clear secretions. Scheduled nebs. LS diminished. 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 Pt more lethargic today, his mother updated. Pt's Mother states he did vomit at home before coming in, ? Aspiration.

## 2021-01-16 NOTE — PLAN OF CARE
DATE & TIME: 1/15/21 8243-2341  Cognitive Concerns/ Orientation : Alert, non-verbal. Hx TBI. Pt has legal guardian   BEHAVIOR & AGGRESSION TOOL COLOR: Green   CIWA SCORE: NA   ABNL VS/O2: VSS on RA  MOBILITY: Total, T&R, A x2, lift   PAIN MANAGMENT: Absence of nonverbal pain indicators   DIET: NPO, TF running at goal rate of 55mL/hr with 160mL q4h flushes   BOWEL/BLADDER: Incontinent of B/B, condom catheter removed due to pt pulling/removing   ABNL LAB/BG: UA pos  DRAIN/DEVICES: R arm PIV SL, GJ tube (G gravity drain output, J tube infusing TF)  TELEMETRY RHYTHM: N/A  SKIN: Blanchable redness to coccyx and groin. GJ tube WNL ex slight drainage. WOC orders   TESTS/PROCEDURES: N/A  D/C DAY/GOALS/PLACE: Pending improvement   OTHER IMPORTANT INFO: Congested cough, non productive. Scheduled nebs. LS diminished. 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

## 2021-01-17 LAB
ANION GAP SERPL CALCULATED.3IONS-SCNC: 4 MMOL/L (ref 3–14)
BUN SERPL-MCNC: 11 MG/DL (ref 7–30)
CALCIUM SERPL-MCNC: 8.7 MG/DL (ref 8.5–10.1)
CHLORIDE SERPL-SCNC: 112 MMOL/L (ref 94–109)
CO2 SERPL-SCNC: 28 MMOL/L (ref 20–32)
CREAT SERPL-MCNC: 0.91 MG/DL (ref 0.66–1.25)
ERYTHROCYTE [DISTWIDTH] IN BLOOD BY AUTOMATED COUNT: 14 % (ref 10–15)
GFR SERPL CREATININE-BSD FRML MDRD: >90 ML/MIN/{1.73_M2}
GLUCOSE SERPL-MCNC: 149 MG/DL (ref 70–99)
HCT VFR BLD AUTO: 35.4 % (ref 40–53)
HGB BLD-MCNC: 11.1 G/DL (ref 13.3–17.7)
MCH RBC QN AUTO: 29.1 PG (ref 26.5–33)
MCHC RBC AUTO-ENTMCNC: 31.4 G/DL (ref 31.5–36.5)
MCV RBC AUTO: 93 FL (ref 78–100)
PLATELET # BLD AUTO: 119 10E9/L (ref 150–450)
POTASSIUM SERPL-SCNC: 4 MMOL/L (ref 3.4–5.3)
RBC # BLD AUTO: 3.82 10E12/L (ref 4.4–5.9)
SODIUM SERPL-SCNC: 144 MMOL/L (ref 133–144)
WBC # BLD AUTO: 7.1 10E9/L (ref 4–11)

## 2021-01-17 PROCEDURE — 999N000157 HC STATISTIC RCP TIME EA 10 MIN: Mod: ZZRT

## 2021-01-17 PROCEDURE — 120N000001 HC R&B MED SURG/OB

## 2021-01-17 PROCEDURE — 258N000003 HC RX IP 258 OP 636: Performed by: HOSPITALIST

## 2021-01-17 PROCEDURE — 99232 SBSQ HOSP IP/OBS MODERATE 35: CPT | Performed by: HOSPITALIST

## 2021-01-17 PROCEDURE — 250N000013 HC RX MED GY IP 250 OP 250 PS 637

## 2021-01-17 PROCEDURE — 250N000009 HC RX 250: Performed by: HOSPITALIST

## 2021-01-17 PROCEDURE — 250N000013 HC RX MED GY IP 250 OP 250 PS 637: Performed by: HOSPITALIST

## 2021-01-17 PROCEDURE — 250N000011 HC RX IP 250 OP 636: Performed by: INTERNAL MEDICINE

## 2021-01-17 PROCEDURE — 36415 COLL VENOUS BLD VENIPUNCTURE: CPT | Performed by: HOSPITALIST

## 2021-01-17 PROCEDURE — 94640 AIRWAY INHALATION TREATMENT: CPT | Mod: ZZRT

## 2021-01-17 PROCEDURE — 250N000009 HC RX 250

## 2021-01-17 PROCEDURE — 272N000078 HC NUTRITION PRODUCT INTERMEDIATE LITER

## 2021-01-17 PROCEDURE — 80048 BASIC METABOLIC PNL TOTAL CA: CPT | Performed by: HOSPITALIST

## 2021-01-17 PROCEDURE — 94640 AIRWAY INHALATION TREATMENT: CPT | Mod: 76,ZZRT

## 2021-01-17 PROCEDURE — 250N000013 HC RX MED GY IP 250 OP 250 PS 637: Performed by: INTERNAL MEDICINE

## 2021-01-17 PROCEDURE — 85027 COMPLETE CBC AUTOMATED: CPT | Performed by: HOSPITALIST

## 2021-01-17 RX ORDER — LEVOTHYROXINE SODIUM 75 UG/1
150 TABLET ORAL EVERY MORNING
Status: DISCONTINUED | OUTPATIENT
Start: 2021-01-18 | End: 2021-01-18 | Stop reason: HOSPADM

## 2021-01-17 RX ORDER — CARBAMAZEPINE 100 MG/5ML
100 SUSPENSION ORAL DAILY
Status: DISCONTINUED | OUTPATIENT
Start: 2021-01-17 | End: 2021-01-18 | Stop reason: HOSPADM

## 2021-01-17 RX ORDER — ACETAMINOPHEN 325 MG/1
650 TABLET ORAL EVERY 4 HOURS PRN
Status: DISCONTINUED | OUTPATIENT
Start: 2021-01-17 | End: 2021-01-18 | Stop reason: HOSPADM

## 2021-01-17 RX ORDER — SODIUM CHLORIDE 9 MG/ML
INJECTION, SOLUTION INTRAVENOUS CONTINUOUS
Status: ACTIVE | OUTPATIENT
Start: 2021-01-17 | End: 2021-01-17

## 2021-01-17 RX ORDER — SODIUM BICARBONATE 650 MG/1
650 TABLET ORAL DAILY
Status: DISCONTINUED | OUTPATIENT
Start: 2021-01-18 | End: 2021-01-18 | Stop reason: HOSPADM

## 2021-01-17 RX ADMIN — CARBAMAZEPINE 150 MG: 100 SUSPENSION ORAL at 00:21

## 2021-01-17 RX ADMIN — ACETYLCYSTEINE 2 ML: 200 SOLUTION ORAL; RESPIRATORY (INHALATION) at 19:30

## 2021-01-17 RX ADMIN — SODIUM CHLORIDE: 9 INJECTION, SOLUTION INTRAVENOUS at 12:28

## 2021-01-17 RX ADMIN — ALBUTEROL SULFATE 2.5 MG: 2.5 SOLUTION RESPIRATORY (INHALATION) at 07:26

## 2021-01-17 RX ADMIN — ACETYLCYSTEINE 2 ML: 200 SOLUTION ORAL; RESPIRATORY (INHALATION) at 10:47

## 2021-01-17 RX ADMIN — ALBUTEROL SULFATE 2.5 MG: 2.5 SOLUTION RESPIRATORY (INHALATION) at 19:30

## 2021-01-17 RX ADMIN — ACETYLCYSTEINE 2 ML: 200 SOLUTION ORAL; RESPIRATORY (INHALATION) at 16:13

## 2021-01-17 RX ADMIN — POTASSIUM & SODIUM PHOSPHATES POWDER PACK 280-160-250 MG 1 PACKET: 280-160-250 PACK at 16:37

## 2021-01-17 RX ADMIN — POTASSIUM & SODIUM PHOSPHATES POWDER PACK 280-160-250 MG 1 PACKET: 280-160-250 PACK at 08:39

## 2021-01-17 RX ADMIN — PANTOPRAZOLE SODIUM 40 MG: 40 TABLET, DELAYED RELEASE ORAL at 08:39

## 2021-01-17 RX ADMIN — CARBAMAZEPINE 100 MG: 100 SUSPENSION ORAL at 18:30

## 2021-01-17 RX ADMIN — LEVOTHYROXINE SODIUM 150 MCG: 75 TABLET ORAL at 04:50

## 2021-01-17 RX ADMIN — SODIUM BICARBONATE 650 MG TABLET 650 MG: at 08:38

## 2021-01-17 RX ADMIN — METOCLOPRAMIDE HYDROCHLORIDE 10 MG: 5 SOLUTION ORAL at 16:37

## 2021-01-17 RX ADMIN — BRIVARACETAM 100 MG: 10 SOLUTION ORAL at 21:11

## 2021-01-17 RX ADMIN — METOCLOPRAMIDE HYDROCHLORIDE 10 MG: 5 SOLUTION ORAL at 08:40

## 2021-01-17 RX ADMIN — ASPIRIN 81 MG CHEWABLE TABLET 81 MG: 81 TABLET CHEWABLE at 08:37

## 2021-01-17 RX ADMIN — ALBUTEROL SULFATE 2.5 MG: 2.5 SOLUTION RESPIRATORY (INHALATION) at 10:47

## 2021-01-17 RX ADMIN — ACETYLCYSTEINE 2 ML: 200 SOLUTION ORAL; RESPIRATORY (INHALATION) at 07:33

## 2021-01-17 RX ADMIN — HYDROCORTISONE 20 MG: 20 TABLET ORAL at 08:37

## 2021-01-17 RX ADMIN — Medication 1 PACKET: at 11:59

## 2021-01-17 RX ADMIN — METOCLOPRAMIDE HYDROCHLORIDE 10 MG: 5 SOLUTION ORAL at 11:59

## 2021-01-17 RX ADMIN — CARBAMAZEPINE 150 MG: 100 SUSPENSION ORAL at 11:59

## 2021-01-17 RX ADMIN — CEFTRIAXONE SODIUM 1 G: 1 INJECTION, POWDER, FOR SOLUTION INTRAMUSCULAR; INTRAVENOUS at 08:39

## 2021-01-17 RX ADMIN — HYDROCORTISONE 10 MG: 10 TABLET ORAL at 16:37

## 2021-01-17 RX ADMIN — ALBUTEROL SULFATE 2.5 MG: 2.5 SOLUTION RESPIRATORY (INHALATION) at 16:13

## 2021-01-17 RX ADMIN — ALBUTEROL SULFATE 2.5 MG: 2.5 SOLUTION RESPIRATORY (INHALATION) at 22:57

## 2021-01-17 RX ADMIN — POTASSIUM CHLORIDE 20 MEQ: 1.5 POWDER, FOR SOLUTION ORAL at 08:39

## 2021-01-17 RX ADMIN — METOCLOPRAMIDE HYDROCHLORIDE 10 MG: 5 SOLUTION ORAL at 20:04

## 2021-01-17 RX ADMIN — BRIVARACETAM 100 MG: 10 SOLUTION ORAL at 09:30

## 2021-01-17 RX ADMIN — POTASSIUM & SODIUM PHOSPHATES POWDER PACK 280-160-250 MG 1 PACKET: 280-160-250 PACK at 21:11

## 2021-01-17 RX ADMIN — CARBAMAZEPINE 150 MG: 100 SUSPENSION ORAL at 06:44

## 2021-01-17 ASSESSMENT — ACTIVITIES OF DAILY LIVING (ADL)
ADLS_ACUITY_SCORE: 40
ADLS_ACUITY_SCORE: 46
ADLS_ACUITY_SCORE: 40

## 2021-01-17 ASSESSMENT — MIFFLIN-ST. JEOR: SCORE: 1567.13

## 2021-01-17 NOTE — PLAN OF CARE
DATE & TIME: 1/16/21 0725-5068  Cognitive Concerns/ Orientation : Lethargic on evening, more alert over night. Non-verbal. Hx TBI. Pt has legal guardian   BEHAVIOR & AGGRESSION TOOL COLOR: Green   ABNL VS/O2: VSS on 1-2L NC sats 90-95%  MOBILITY: Total, T&R, A x2, lift   PAIN MANAGMENT: Absence of nonverbal pain indicators   DIET: NPO, TF running at goal rate of 55mL/hr with 160mL q4h flushes  BOWEL/BLADDER: Incontinent of B/B, condom cath replaced. No BM overnight   ABNL LAB/BG: UA pos  DRAIN/DEVICES: R arm PIV infusing NS @50mL/hr., GJ tube (G gravity drain green output, J tube infusing TF)  TELEMETRY RHYTHM: N/A  SKIN: Blanchable redness to coccyx and groin. GJ tube WNL ex slight drainage. WOC orders   TESTS/PROCEDURES: None scheduled   D/C DAY/GOALS/PLACE: Pending improvement   OTHER IMPORTANT INFO: Congested cough, now productive, frequent large yellow phlegm. Scheduled nebs. LS diminished. 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

## 2021-01-17 NOTE — PROGRESS NOTES
[facility]  Hospitalist Progress Note        Helio Galan MD   01/17/2021        Interval History:      - remains afebrile, leukocytosis trending down; still seems slightly lethargic although difficult to assess given his non-verbal status         Assessment and Plan:        Keyon Farias is a 60 year old male with TBI with spastic hemiplegia and dysphagia s/p PEG, nonverbal, seizure disorder, panhypopituitarism, GERD, hx recurrent aspiration pneumonia with sepsis (>5 hospitalizations for this since August 2020) who was admitted on 1/15/2021 with sepsis and UTI, presented with fever and vomiting.      Sepsis secondary to Klebsiella UTI  H/o VRE and MRSA infections  - CT CAP noted with mild hazy infiltrate right lower lobe suspicious for a mild or low grade pneumonitis, diffuse bladder wall thickening could be seen with cystitis, also noted likely chronic pseudocyst.  - on presentation T max 100.5, wbc 16.2, normal lactate (1.8), tachy to 110s with soft BP 90s/50s; COVID/Influenza negative  - fever and leukocytosis trended down, wbc 16---13---7.1  - Urine culture growing >100K Klebsiella, pnasensitive; Blood cultures with no growth so far  - given lethargy will continue Rocephin for now, can probably transition to PO antibiotics on 1/18 if clinically stable  - hypotension improved but with mild intermittent tachycardia; continue IVF with NS at 50 ml/hr     H/o recurrent aspiration with pneumonitis  Chronic pulmonary aspiration  Chest CT with mild hazy opacity of right lower lobe, similar to prior imaging. He lives at risk for pneumonitis and aspiration every day.    -supplemental oxygen as needed-- weaned to room air  -aspiration precautions  -continue PTA mucomyst, albuterol nebs      TBI with spastic hemiplegia  Epilepsy  Panhypopituitarism  [hydrocortisone, levothyroxine]  -continue PTA medications including Tegretol, Hydrocortisone, synthroid      Dysphagia with PEG  Feeding tube dependent  "  -nutrition following for continued tube feeds     H/o coccygeal pressure ulcer  -WOC following     Chronic, stable problems:  GERD: continue PTA pantoprazole        Ruled Out of COVID-19 infection => negative PCR test 1/15  Low suspicion for infection. He has had at least 10 prior negative COVID-19 tests.      DVT prophylaxis: Pneumatic Compression Devices  Code Status:  Full     Disposition: Expected discharge likely 1/18 if clinically stable; SW following for disposition, has home cares/PCA, lives with his mum; non verbal at baseline, mum states she is able to transfer Keyon from bed to chair    Care plan discussed with patient's mum Savannah over the phone and told her that we anticipate discharge on 1/18                   Physical Exam:      Blood pressure 109/63, pulse 89, temperature 98.6  F (37  C), temperature source Axillary, resp. rate 20, height 1.803 m (5' 11\"), weight 72.5 kg (159 lb 13.3 oz), SpO2 94 %.  Vitals:    01/15/21 0620 01/16/21 0614 01/17/21 0637   Weight: 74.8 kg (164 lb 12.8 oz) 72.3 kg (159 lb 6.3 oz) 72.5 kg (159 lb 13.3 oz)     Vital Signs with Ranges  Temp:  [98.3  F (36.8  C)-99.8  F (37.7  C)] 98.6  F (37  C)  Pulse:  [] 89  Resp:  [20-22] 20  BP: ()/(58-69) 109/63  SpO2:  [90 %-98 %] 94 %  I/O's Last 24 hours  I/O last 3 completed shifts:  In: 2025 [NG/GT:2025]  Out: 1075 [Urine:850; Emesis/NG output:225]    Constitutional: conscious, alert, awake, mostly nonverbal but follows simple commands and interacts minimally with yes, no, head nodding; lethargic ; resting comfortably in no apparent distress   HEENT: Pupils equal and reactive to light and accomodation,   Oral cavity: Moist mucosa   Cardiovascular: Normal s1 s2, regular rate and rhythm, no murmur   Lungs: B/l clear to auscultation, no wheezes or crepitations   Abdomen: Soft, nontender, nondistended.  A feeding tube is in place.  No guarding, rigidity or rebound tenderness   LE : No edema   Musculoskeletal: He has " spastic hemiplegia on the right and is mostly nonverbal   Neuro:    Psychiatry: normal mood and affect  Condom catheter in place                Medications:          acetylcysteine  2 mL Nebulization 4x Daily     albuterol  2.5 mg Nebulization Q4H While awake     aspirin  81 mg Oral or Feeding Tube Daily     Brivaracetam  100 mg Oral or Feeding Tube BID     carBAMazepine  100 mg Oral Daily     carBAMazepine  150 mg Oral or Feeding Tube TID     cefTRIAXone  1 g Intravenous Q24H     fiber modular (NUTRISOURCE FIBER)  1 packet Per Feeding Tube Daily     hydrocortisone  10 mg Oral or Feeding Tube Daily with supper     hydrocortisone  20 mg Oral or Feeding Tube QAM     levothyroxine  150 mcg Oral QAM     metoclopramide  10 mg Oral 4x Daily AC & HS     pantoprazole  40 mg Per J Tube Daily     potassium & sodium phosphates  1 packet Oral TID     potassium chloride  20 mEq Oral or Feeding Tube Daily     sodium bicarbonate  650 mg Oral Daily     sodium chloride (PF)  3 mL Intracatheter Q8H     PRN Meds: acetaminophen, acetaminophen, bisacodyl, dextrose, lidocaine 4%, lidocaine (buffered or not buffered), ondansetron **OR** ondansetron, polyethylene glycol, senna-docusate **OR** senna-docusate, sodium chloride (PF)         Data:      All new lab and imaging data was reviewed.   Recent Labs   Lab Test 01/16/21  1118 01/15/21  0116 12/19/20  0430 10/26/20  0400 10/26/20  0400 10/25/20  1345 10/25/20  0742   WBC 13.2* 16.2* 9.1   < > 17.0* 27.2* 23.1*   HGB 12.1* 12.9* 12.3*   < > 9.8* 11.7* 12.6*   MCV 92 89 88   < > 89 91 90    144* 205   < > 147* 224 220   INR  --   --   --   --  1.82* 1.86* 1.89*    < > = values in this interval not displayed.      Recent Labs   Lab Test 01/16/21  1118 01/15/21  0734 01/15/21  0116 12/20/20  0520 12/19/20  0430 12/19/20  0430     --  133 135   < > 132*   POTASSIUM 4.5 3.9 4.0  --   --  3.6   CHLORIDE 109  --  98  --   --  97   CO2 26  --  29  --   --  31   BUN 12  --  22  --    --  18   CR 0.98  --  0.86  --   --  0.80   ANIONGAP 4  --  6  --   --  4   STEVE 9.0  --  8.5  --   --  8.6   *  --  94  --   --  95    < > = values in this interval not displayed.     Recent Labs   Lab Test 10/25/20  2100 10/25/20  1345 10/25/20  0742   TROPI 0.047* 0.133* 0.095*

## 2021-01-18 VITALS
TEMPERATURE: 97.4 F | DIASTOLIC BLOOD PRESSURE: 75 MMHG | OXYGEN SATURATION: 95 % | SYSTOLIC BLOOD PRESSURE: 137 MMHG | RESPIRATION RATE: 16 BRPM | WEIGHT: 163.58 LBS | BODY MASS INDEX: 22.9 KG/M2 | HEIGHT: 71 IN | HEART RATE: 87 BPM

## 2021-01-18 DIAGNOSIS — E87.6 HYPOKALEMIA: ICD-10-CM

## 2021-01-18 LAB
ANION GAP SERPL CALCULATED.3IONS-SCNC: 3 MMOL/L (ref 3–14)
BUN SERPL-MCNC: 12 MG/DL (ref 7–30)
CALCIUM SERPL-MCNC: 8.5 MG/DL (ref 8.5–10.1)
CHLORIDE SERPL-SCNC: 109 MMOL/L (ref 94–109)
CO2 SERPL-SCNC: 27 MMOL/L (ref 20–32)
CREAT SERPL-MCNC: 0.76 MG/DL (ref 0.66–1.25)
GFR SERPL CREATININE-BSD FRML MDRD: >90 ML/MIN/{1.73_M2}
GLUCOSE SERPL-MCNC: 144 MG/DL (ref 70–99)
MAGNESIUM SERPL-MCNC: 2.1 MG/DL (ref 1.6–2.3)
PHOSPHATE SERPL-MCNC: 2.9 MG/DL (ref 2.5–4.5)
POTASSIUM SERPL-SCNC: 3.9 MMOL/L (ref 3.4–5.3)
SODIUM SERPL-SCNC: 139 MMOL/L (ref 133–144)

## 2021-01-18 PROCEDURE — 250N000013 HC RX MED GY IP 250 OP 250 PS 637

## 2021-01-18 PROCEDURE — 250N000009 HC RX 250

## 2021-01-18 PROCEDURE — 250N000013 HC RX MED GY IP 250 OP 250 PS 637: Performed by: INTERNAL MEDICINE

## 2021-01-18 PROCEDURE — 40000275 ZZH STATISTIC RCP TIME EA 10 MIN

## 2021-01-18 PROCEDURE — 80048 BASIC METABOLIC PNL TOTAL CA: CPT | Performed by: INTERNAL MEDICINE

## 2021-01-18 PROCEDURE — 94640 AIRWAY INHALATION TREATMENT: CPT | Mod: 76,ZZRT

## 2021-01-18 PROCEDURE — 94640 AIRWAY INHALATION TREATMENT: CPT

## 2021-01-18 PROCEDURE — 99239 HOSP IP/OBS DSCHRG MGMT >30: CPT | Performed by: INTERNAL MEDICINE

## 2021-01-18 PROCEDURE — 250N000009 HC RX 250: Performed by: HOSPITALIST

## 2021-01-18 PROCEDURE — 999N000157 HC STATISTIC RCP TIME EA 10 MIN: Mod: ZZRT

## 2021-01-18 PROCEDURE — 36415 COLL VENOUS BLD VENIPUNCTURE: CPT | Performed by: INTERNAL MEDICINE

## 2021-01-18 PROCEDURE — 250N000013 HC RX MED GY IP 250 OP 250 PS 637: Performed by: HOSPITALIST

## 2021-01-18 PROCEDURE — 83735 ASSAY OF MAGNESIUM: CPT | Performed by: INTERNAL MEDICINE

## 2021-01-18 PROCEDURE — 84100 ASSAY OF PHOSPHORUS: CPT | Performed by: INTERNAL MEDICINE

## 2021-01-18 RX ORDER — CEFUROXIME AXETIL 500 MG/1
500 TABLET ORAL EVERY 12 HOURS SCHEDULED
Status: DISCONTINUED | OUTPATIENT
Start: 2021-01-18 | End: 2021-01-18 | Stop reason: HOSPADM

## 2021-01-18 RX ORDER — CEFUROXIME AXETIL 500 MG/1
500 TABLET ORAL EVERY 12 HOURS
Qty: 7 TABLET | Refills: 0 | Status: SHIPPED | OUTPATIENT
Start: 2021-01-18 | End: 2021-01-22

## 2021-01-18 RX ORDER — POTASSIUM CHLORIDE 1.5 G/1.58G
20 POWDER, FOR SOLUTION ORAL DAILY
Qty: 30 EACH | Refills: 0 | Status: SHIPPED | OUTPATIENT
Start: 2021-01-18 | End: 2021-01-20

## 2021-01-18 RX ADMIN — ACETYLCYSTEINE 2 ML: 200 SOLUTION ORAL; RESPIRATORY (INHALATION) at 15:12

## 2021-01-18 RX ADMIN — ACETYLCYSTEINE 2 ML: 200 SOLUTION ORAL; RESPIRATORY (INHALATION) at 07:08

## 2021-01-18 RX ADMIN — METOCLOPRAMIDE HYDROCHLORIDE 10 MG: 5 SOLUTION ORAL at 09:38

## 2021-01-18 RX ADMIN — ALBUTEROL SULFATE 2.5 MG: 2.5 SOLUTION RESPIRATORY (INHALATION) at 15:12

## 2021-01-18 RX ADMIN — Medication 1 PACKET: at 10:45

## 2021-01-18 RX ADMIN — CEFUROXIME AXETIL 500 MG: 500 TABLET ORAL at 12:08

## 2021-01-18 RX ADMIN — CARBAMAZEPINE 150 MG: 100 SUSPENSION ORAL at 12:08

## 2021-01-18 RX ADMIN — CARBAMAZEPINE 150 MG: 100 SUSPENSION ORAL at 05:05

## 2021-01-18 RX ADMIN — SODIUM BICARBONATE 650 MG TABLET 650 MG: at 09:11

## 2021-01-18 RX ADMIN — POTASSIUM CHLORIDE 20 MEQ: 1.5 POWDER, FOR SOLUTION ORAL at 09:39

## 2021-01-18 RX ADMIN — ALBUTEROL SULFATE 2.5 MG: 2.5 SOLUTION RESPIRATORY (INHALATION) at 10:58

## 2021-01-18 RX ADMIN — BRIVARACETAM 100 MG: 10 SOLUTION ORAL at 09:11

## 2021-01-18 RX ADMIN — HYDROCORTISONE 10 MG: 10 TABLET ORAL at 14:51

## 2021-01-18 RX ADMIN — ACETYLCYSTEINE 2 ML: 200 SOLUTION ORAL; RESPIRATORY (INHALATION) at 10:58

## 2021-01-18 RX ADMIN — ALBUTEROL SULFATE 2.5 MG: 2.5 SOLUTION RESPIRATORY (INHALATION) at 07:08

## 2021-01-18 RX ADMIN — PANTOPRAZOLE SODIUM 40 MG: 40 TABLET, DELAYED RELEASE ORAL at 09:39

## 2021-01-18 RX ADMIN — HYDROCORTISONE 20 MG: 20 TABLET ORAL at 09:18

## 2021-01-18 RX ADMIN — ASPIRIN 81 MG CHEWABLE TABLET 81 MG: 81 TABLET CHEWABLE at 09:38

## 2021-01-18 RX ADMIN — CARBAMAZEPINE 150 MG: 100 SUSPENSION ORAL at 00:09

## 2021-01-18 RX ADMIN — METOCLOPRAMIDE HYDROCHLORIDE 10 MG: 5 SOLUTION ORAL at 12:08

## 2021-01-18 RX ADMIN — POTASSIUM & SODIUM PHOSPHATES POWDER PACK 280-160-250 MG 1 PACKET: 280-160-250 PACK at 09:39

## 2021-01-18 RX ADMIN — LEVOTHYROXINE SODIUM 150 MCG: 75 TABLET ORAL at 05:05

## 2021-01-18 ASSESSMENT — ACTIVITIES OF DAILY LIVING (ADL)
ADLS_ACUITY_SCORE: 44

## 2021-01-18 ASSESSMENT — MIFFLIN-ST. JEOR: SCORE: 1584.13

## 2021-01-18 NOTE — PLAN OF CARE
Pt discharged home with [facility] transport. Mother notified. Prescriptions sent with patient.

## 2021-01-18 NOTE — DISCHARGE SUMMARY
[facility]  Hospitalist Discharge Summary      Date of Admission:  1/15/2021  Date of Discharge:  1/18/2021  Discharging Provider: Yaron Lujan,       Discharge Diagnoses   Sepsis secondary to Klebsiella UTI  H/o VRE and MRSA infections   H/o recurrent aspiration with pneumonitis  Chronic pulmonary aspiration   TBI with spastic hemiplegia  Epilepsy  Panhypopituitarism   Dysphagia with PEG  Feeding tube dependent   H/o coccygeal pressure ulcer, present on admission   GERD    Follow-ups Needed After Discharge   Follow-up Appointments     Follow-up and recommended labs and tests       Appointment with DR Hernandez  1/25 1 pm  [facility]         Follow-up and recommended labs and tests       Follow up with primary care provider, Carlos Gomez, within 2-4 weeks,   for hospital follow- up. No follow up labs or test are needed.           Unresulted Labs Ordered in the Past 30 Days of this Admission     Date and Time Order Name Status Description    1/15/2021 0128 Blood culture Preliminary     1/15/2021 0128 Blood culture Preliminary       These results will be followed up by PCP     Discharge Disposition   Discharged to home  Condition at discharge: Stable    Hospital Course   Keyon Farias is a 60 year old male with TBI with spastic hemiplegia and dysphagia s/p PEG, nonverbal, seizure disorder, panhypopituitarism, GERD, hx recurrent aspiration pneumonia with sepsis (>5 hospitalizations for this since August 2020) who was admitted on 1/15/2021 with sepsis and UTI, presented with fever and vomiting.      Sepsis secondary to Klebsiella UTI  H/o VRE and MRSA infections  - CT CAP noted with mild hazy infiltrate right lower lobe suspicious for a mild or low grade pneumonitis, diffuse bladder wall thickening could be seen with cystitis, also noted likely chronic pseudocyst.  - on presentation T max 100.5, wbc 16.2, normal lactate (1.8), tachy to 110s with soft BP 90s/50s; COVID/Influenza  negative  - fever and leukocytosis trended down, wbc 16---13---7.1  - Urine culture growing >100K Klebsiella, only resistant to Ampicillin  - hypotension improved but with mild intermittent tachycardia; continue IVF with NS at 50 ml/hr  - was ceftiaxone while here.  IV infiltrated on day of discharge and switched to Ceftin.  Will complete a course of ceftin for a total of 7 days of antibiotics      H/o recurrent aspiration with pneumonitis  Chronic pulmonary aspiration  Chest CT with mild hazy opacity of right lower lobe, similar to prior imaging. He lives at risk for pneumonitis and aspiration every day.    -supplemental oxygen as needed-- weaned to room air  -aspiration precautions  -continue PTA mucomyst, albuterol nebs      TBI with spastic hemiplegia  Epilepsy  Panhypopituitarism  [hydrocortisone, levothyroxine]  -continue PTA medications including Tegretol, Hydrocortisone, synthroid      Dysphagia with PEG  Feeding tube dependent   -nutrition following for continued tube feeds     H/o coccygeal pressure ulcer  -WOC following       Consultations This Hospital Stay   WOUND OSTOMY CONTINENCE NURSE  IP CONSULT  NUTRITION SERVICES ADULT IP CONSULT  NUTRITION SERVICES ADULT IP CONSULT  PHARMACY IP CONSULT  CARE MANAGEMENT / SOCIAL WORK IP CONSULT    Code Status   Full Code    Time Spent on this Encounter   IYaron DO, personally saw the patient today and spent greater than 30 minutes discharging this patient.       Yaron Lujan DO  [place] [address] [facility]  [address] [place] [address]  Phone: [phone]  ______________________________________________________________________    Physical Exam   Vital Signs: Temp: 98.3  F (36.8  C) Temp src: Axillary BP: 93/55 Pulse: 84   Resp: 20 SpO2: 97 % O2 Device: None (Room air)    Weight: 163 lbs 9.3 oz  General Appearance: Resting comfortably. NAD  Respiratory: Clear to auscultation.  No respiratory  distress  Cardiovascular: RRR.  No obvious murmurs  GI: Bowel sounds noted.  Non-distended  Skin: No obvious rashes or cyanosis  Other: Non-verbal at baseline.  Will make eye contact        Primary Care Physician   Carlos Gomez    Discharge Orders      Home care nursing referral      Follow-up and recommended labs and tests     Appointment with DR Hernandez  1/25 1 pm  [facility]     Reason for your hospital stay    Urinary tract infection     Follow-up and recommended labs and tests     Follow up with primary care provider, Carlos Gomez, within 2-4 weeks, for hospital follow- up. No follow up labs or test are needed.     Activity    Your activity upon discharge: activity as tolerated     Diet    Follow this diet upon discharge: Orders Placed This Encounter      NPO      Adult Formula Drip Feeding: Continuous Isosource 1.5; Jejunostomy; Goal Rate: 55; mL/hr; Medication - Feeding Tube Flush Frequency: At least 15-30 mL water before and after medication administration and with tube clogging; Start @ 30 ml/hr. Advanc...       Significant Results and Procedures   Most Recent 3 CBC's:  Recent Labs   Lab Test 01/17/21  1003 01/16/21  1118 01/15/21  0116   WBC 7.1 13.2* 16.2*   HGB 11.1* 12.1* 12.9*   MCV 93 92 89   * 151 144*     Most Recent 3 BMP's:  Recent Labs   Lab Test 01/18/21  0854 01/17/21  1003 01/16/21  1118    144 139   POTASSIUM 3.9 4.0 4.5   CHLORIDE 109 112* 109   CO2 27 28 26   BUN 12 11 12   CR 0.76 0.91 0.98   ANIONGAP 3 4 4   STEVE 8.5 8.7 9.0   * 149* 200*     Most Recent 6 Bacteria Isolates From Any Culture (See EPIC Reports for Culture Details):  Recent Labs   Lab Test 01/15/21  0214 01/15/21  0116 11/01/20  2155 11/01/20  2130 10/25/20  1520 10/25/20  0758   CULT >100,000 colonies/mL  Klebsiella pneumoniae  * No growth after 3 days  No growth after 3 days No growth No growth Light growth  Klebsiella pneumoniae  *  Light growth  Pseudomonas aeruginosa  *  Light  growth  Methicillin resistant Staphylococcus aureus (MRSA)  * No growth  No growth   ,   Results for orders placed or performed during the hospital encounter of 01/15/21   XR Chest Port 1 View    Narrative    EXAM: XR CHEST PORT 1 VW  LOCATION: [facility]  DATE/TIME: 1/15/2021 1:43 AM    INDICATION: Fever.  COMPARISON: Chest radiographs 12/18/2020. CTA chest      Impression    IMPRESSION: Low lung volumes and mild bibasilar atelectasis. Chronic elevation of the right hemidiaphragm. Normal heart size and pulmonary vascularity. No significant pleural fluid. No pneumothorax.   CT Chest/Abdomen/Pelvis w Contrast    Narrative    EXAM: CT CHEST/ABDOMEN/PELVIS W CONTRAST  LOCATION: [facility]  DATE/TIME: 1/15/2021 2:35 AM    INDICATION: Fever, vomiting.    COMPARISON: 10/25/2020.    TECHNIQUE: CT scan of the chest, abdomen and pelvis was performed following injection of IV contrast. Multiplanar reformats were obtained. Dose reduction techniques were used.     CONTRAST: 85 mL Isovue-370.    FINDINGS:   LUNGS AND PLEURA: Motion artifact limits some detail. Mild hazy infiltrate versus atelectasis right lower lobe. Cannot exclude a mild or low grade pneumonitis. Clinical correlation. Mild scattered subpleural scarring or atelectasis elsewhere in the   lungs. No pleural effusion.    MEDIASTINUM/AXILLAE: Normal.    HEPATOBILIARY: Small cyst in the left hepatic lobe superiorly. Liver is otherwise negative. Gallbladder appears contracted. No calcified gallstones or biliary dilatation.    PANCREAS: Mild generalized pancreatic atrophy. 2.4 cm cyst in the tail of the pancreas is slightly smaller in size and is suspected to represent a chronic pseudocyst.    SPLEEN: Normal.    ADRENAL GLANDS: Normal.    KIDNEYS/BLADDER: Tiny left renal cyst. Kidneys are otherwise negative. No hydronephrosis. Bladder is mostly decompressed. However, there does appear to be some generalized bladder wall thickening. Clinical  correlation for any signs of cystitis.    BOWEL: Percutaneous gastrojejunostomy tube. Bowel is normal in caliber with no evidence for obstruction. No acute bowel findings.    LYMPH NODES: Normal.    VASCULATURE: Aortic calcification without aneurysm.    PELVIC ORGANS: Normal.    MUSCULOSKELETAL: Normal.      Impression    IMPRESSION:  1.  Mild hazy infiltrate right lower lobe suspicious for a mild or low grade pneumonitis.    2.  Diffuse bladder wall thickening could be seen with cystitis. Clinical correlation.    3.  2.4 cm cyst in the tail of the pancreas is smaller than on the most recent exam and has been present on multiple prior exams. This may represent a chronic pseudocyst.    4.  No significant findings elsewhere.           Discharge Medications   Current Discharge Medication List      START taking these medications    Details   cefuroxime (CEFTIN) 500 MG tablet Take 1 tablet (500 mg) by mouth every 12 hours for 7 doses  Qty: 7 tablet, Refills: 0    Associated Diagnoses: Urinary tract infection without hematuria, site unspecified         CONTINUE these medications which have NOT CHANGED    Details   acetaminophen (TYLENOL 8 HOUR) 650 MG CR tablet Take 650 mg by mouth every 8 hours as needed for mild pain or fever      acetylcysteine (MUCOMYST) 20 % neb solution Take 2 mLs by nebulization 4 times daily With albuterol at 0700, 1100, 1500, and 1900       albuterol (PROVENTIL) (5 MG/ML) 0.5% neb solution Take 2.5 mg by nebulization every 4 hours (while awake) 0700 1100 1500 1900 with mucomyst       aspirin (ASA) 81 MG chewable tablet 81 mg by Oral or Feeding Tube route daily At 0900      bacitracin ointment Apply topically daily as needed for wound care To PEG site.       Brivaracetam (BRIVIACT) 10 MG/ML solution 100 mg by Oral or Feeding Tube route 2 times daily 0900, 2100      calcium carbonate 1250 MG/5ML SUSP suspension Take 1,250 mg by mouth 3 times daily 0900, 1500, 2100      !! carBAMazepine (TEGRETOL)  100 MG/5ML suspension Take 100 mg by mouth daily Take at 1800       !! carBAMazepine (TEGRETOL) 100 MG/5ML suspension 150 mg by Oral or Feeding Tube route 3 times daily At 06:00, 12:00, and 24:00 for seizures      clotrimazole-betamethasone (LOTRISONE) 1-0.05 % external cream Apply topically 2 times daily as needed   Qty:        ferrous sulfate 220 (44 Fe) MG/5ML ELIX 220 mg by Per Feeding Tube route daily @ 0900      Guar Gum (FIBER MODULAR, NUTRISOURCE FIBER,) packet 1 packet by Per G Tube route daily  Qty: 30 packet, Refills: 0    Associated Diagnoses: Pneumonia of both lower lobes due to infectious organism      !! hydrocortisone (CORTEF) 10 MG tablet 10 mg by Oral or FT or NG tube route daily (with dinner) At 1500      !! hydrocortisone (CORTEF) 10 MG tablet 20 mg by Oral or FT or NG tube route every morning At 0900      hydrocortisone 1 % CREA cream Place rectally 2 times daily as needed for other Apply to reddened memo areas as needed      levothyroxine (SYNTHROID/LEVOTHROID) 150 MCG tablet Take 150 mcg by mouth every morning At 0500      melatonin (MELATONIN) 1 MG/ML LIQD liquid 6 mg by Per NG tube route At Bedtime       metoclopramide (REGLAN) 10 MG/10ML SOLN solution Take 10 mg by mouth 4 times daily (before meals and nightly) 0800, 1200, 1600, 2000  Disconnects bag before administration, then waits 45 mins before reconnecting after giving the medication      miconazole (MICATIN) 2 % AERP powder Apply topically 2 times daily as needed       mupirocin (BACTROBAN) 2 % external ointment Apply topically 2 times daily as needed       pantoprazole (PROTONIX) 2 mg/mL SUSP suspension 20 mLs (40 mg) by Per J Tube route daily  Qty: 400 mL, Refills: 0    Comments: Future refills by PCP Dr. Carlos Gomez with phone number [phone].  Associated Diagnoses: Gastroesophageal reflux disease, esophagitis presence not specified      potassium & sodium phosphates (NEUTRA-PHOS) 280-160-250 MG Packet Take 1 packet by mouth  3 times daily       Scopolamine HBr POWD Dispense #90. Mix contents with small amount of water for admin via J-tube.  Administer 0.8 mg three times each day.  Qty: 90 Bottle, Refills: 3    Associated Diagnoses: Aspiration pneumonia (H)      Skin Protectants, Misc. (BALMEX SKIN PROTECTANT) OINT Externally apply topically 2 times daily as needed (irritation) Applay to reddened memo areas twice daily as needed      sodium bicarbonate 650 MG tablet Take 1 tablet (650 mg) by mouth daily  Qty: 30 tablet, Refills: 0    Associated Diagnoses: Hyponatremia      testosterone cypionate (DEPOTESTOTERONE CYPIONATE) 200 MG/ML injection Inject 76 mg into the muscle See Admin Instructions Every 2 weeks on Fridays  76 mg or 0.38 mL      vitamin C (ASCORBIC ACID) 1000 MG TABS 1,000 mg by Oral or Feeding Tube route daily       vitamin D3 (CHOLECALCIFEROL) 2000 units (50 mcg) tablet Take 2,000 Units by mouth daily Crush and feed via j-tube @@ 0900      potassium chloride (KLOR-CON) 20 MEQ packet 20 mEq by Oral or Feeding Tube route daily  Qty: 30 each, Refills: 0    Associated Diagnoses: Hypokalemia      prochlorperazine (COMPAZINE) 25 MG suppository Place 1 suppository (25 mg) rectally every 12 hours as needed for nausea or vomiting  Qty: 15 suppository, Refills: 0    Comments: Future refills by PCP Dr. Carlos Gomez with phone number [phone].  Associated Diagnoses: Aspiration pneumonia of right middle lobe, unspecified aspiration pneumonia type (H)       !! - Potential duplicate medications found. Please discuss with provider.        Allergies   Allergies   Allergen Reactions     Valproic Acid Other (See Comments)     Toxicity w/ bone marrow suspension, elevated ammonia levels      Dilantin [Phenytoin Sodium] Other (See Comments)     Severe Trembling     Scopolamine Hives     Hives with the patch - oral no problem

## 2021-01-18 NOTE — DISCHARGE SUMMARY
Discharge    Patient discharged to home via [facility] with [facility]  Care plan note: Pt A&O, non verbal. Total care, cleaned up of urine before discharge.    Listed belongings gathered and returned to patient. Yes  Care Plan and Patient education resolved: Yes  Prescriptions if needed, hard copies sent with patient  Yes  Home and hospital acquired medications returned to patient: NA  Medication Bin checked and emptied on discharge Yes  Follow up appointment made for patient: No

## 2021-01-18 NOTE — PLAN OF CARE
DATE & TIME: 1/17/21 1900-0703  Cognitive Concerns/ Orientation : Alert, non-verbal. Pt smiling and laughing in bed. Hx TBI. Pt has legal guardian  BEHAVIOR & AGGRESSION TOOL COLOR: Green   ABNL VS/O2: VSS on RA  MOBILITY: Total, T&R, A x2, lift   PAIN MANAGMENT: Absence of nonverbal pain indicators   DIET: NPO, TF running at goal rate of 55mL/hr with 160mL q4h flushes  BOWEL/BLADDER: Incontinent of B/B, condom cath replaced. No BM this shift ABNL LAB/BG: UA pos, WBC 7  DRAIN/DEVICES: R arm PIV infusing NS @50mL/hr until 2300 then SL, GJ tube (G gravity drain yellow output, J tube infusing TF)  TELEMETRY RHYTHM: N/A  SKIN: Blanchable redness to coccyx and groin. GJ tube WNL ex slight drainage. WOC orders   TESTS/PROCEDURES: None scheduled   D/C DAY/GOALS/PLACE: Pending improvement, likely 1/18 with abx. Plan to go home with family and home services   OTHER IMPORTANT INFO: Congested, productive cough. Scheduled nebs. LS diminished. 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

## 2021-01-18 NOTE — TELEPHONE ENCOUNTER
potassium chloride (KLOR-CON) 20 MEQ packet      Last Written Prescription Date:  12/17/20  Last Fill Quantity: 30,   # refills: 0  Last Office Visit : 11/11/20  Future Office visit:  1/20/21    Routing refill request to provider for review/approval because:  Limited quantity without refills provided at last fill  Ok to continue?

## 2021-01-18 NOTE — PROGRESS NOTES
Care Management Discharge Note    Discharge Date: 01/18/21(home)       Discharge Disposition: Home     Discharge Services:  Home Care RN PCA     Discharge DME: None     Discharge Transportation: [facility]      Private pay costs discussed: Not applicable       Education Provided on the Discharge Plan:   With patients Mother  Persons Notified of Discharge Plans:Mother  Patient/Family in Agreement with the Plan:  Yes    Handoff Referral Completed: Yes    Additional Information:  Discussed with patients mother regarding discharge plans. She is in agreement. Requested transportation. Stretcher ride set for 3 pm 1/18 by Alorica.        Anita Casillas RN  Inpatient Care Coordinator  [facility]achel  #[phone]

## 2021-01-19 ENCOUNTER — TELEPHONE (OUTPATIENT)
Dept: FAMILY MEDICINE | Facility: CLINIC | Age: 61
End: 2021-01-19

## 2021-01-19 NOTE — TELEPHONE ENCOUNTER
Received call that 1/2 blood cultures from admission are growing gram positive bacilli in the broth.  Believe this is contaminant as patient was afebrile and clinically improved for >48 hours prior to discharge.  Will follow up on blood culture results.  If 2nd culture is positive will likely need to contact family and have them bring Keyon back in for further evaluation.       Yaron Lujan DO   Hospitalist       Now growing Diphtheroids.  Very likely this is contaminant.

## 2021-01-19 NOTE — PROGRESS NOTES
Keyon Farias  is being evaluated via a billable video visit.      How would you like to obtain your AVS? Mail a copy  For the video visit, send the invitation by: Text to cell phone: [phone]  Will anyone else be joining your video visit? Mom

## 2021-01-19 NOTE — PROGRESS NOTES
Lab called with a positive blood culture from right wrist On day 4, isolated in broth only:   Gram positive bacilli called to  Dr Lujan

## 2021-01-20 ENCOUNTER — VIRTUAL VISIT (OUTPATIENT)
Dept: ENDOCRINOLOGY | Facility: CLINIC | Age: 61
End: 2021-01-20
Payer: MEDICARE

## 2021-01-20 DIAGNOSIS — E03.8 SECONDARY HYPOTHYROIDISM: ICD-10-CM

## 2021-01-20 DIAGNOSIS — E23.2 DIABETES INSIPIDUS (H): ICD-10-CM

## 2021-01-20 DIAGNOSIS — E23.0 PANHYPOPITUITARISM (H): Primary | ICD-10-CM

## 2021-01-20 DIAGNOSIS — E29.1 HYPOGONADISM MALE: ICD-10-CM

## 2021-01-20 DIAGNOSIS — Z51.81 ENCOUNTER FOR MONITORING TESTOSTERONE REPLACEMENT THERAPY: ICD-10-CM

## 2021-01-20 DIAGNOSIS — E27.49 SECONDARY ADRENAL INSUFFICIENCY (H): ICD-10-CM

## 2021-01-20 DIAGNOSIS — Z79.890 ENCOUNTER FOR MONITORING TESTOSTERONE REPLACEMENT THERAPY: ICD-10-CM

## 2021-01-20 PROCEDURE — 99215 OFFICE O/P EST HI 40 MIN: CPT | Mod: 95 | Performed by: INTERNAL MEDICINE

## 2021-01-20 RX ORDER — HYDROCORTISONE 5 MG/1
15 TABLET ORAL EVERY MORNING
Qty: 270 TABLET | Refills: 3 | Status: SHIPPED | OUTPATIENT
Start: 2021-01-20 | End: 2021-04-28

## 2021-01-20 RX ORDER — POTASSIUM CHLORIDE 1.5 G/1
POWDER, FOR SOLUTION ORAL
Qty: 90 EACH | Refills: 0 | Status: SHIPPED | OUTPATIENT
Start: 2021-01-20 | End: 2021-01-20

## 2021-01-20 RX ORDER — HYDROCORTISONE 5 MG/1
15 TABLET ORAL EVERY MORNING
Qty: 270 TABLET | Refills: 3 | Status: SHIPPED | OUTPATIENT
Start: 2021-01-20 | End: 2021-01-20

## 2021-01-20 RX ORDER — HYDROCORTISONE SODIUM SUCCINATE 100 MG/2ML
50 INJECTION, POWDER, FOR SOLUTION INTRAMUSCULAR; INTRAVENOUS
Qty: 10 ML | Refills: 1 | Status: ON HOLD | OUTPATIENT
Start: 2021-01-20 | End: 2021-11-13

## 2021-01-20 NOTE — LETTER
1/20/2021       RE: Keyon Farias  [address] [place] [address]     Dear Colleague,    Thank you for referring your patient, Keyon Farias, to the [facility] [place] at [facility]. Please see a copy of my visit note below.    Keyon Farias  is being evaluated via a billable video visit.      How would you like to obtain your AVS? Mail a copy  For the video visit, send the invitation by: Text to cell phone: [phone]  Will anyone else be joining your video visit? 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  During his hospitalization few months ago; he was noted to have polyuria with up to 10 L in 24 hours in the setting of elevated sodium level at 168.  Patient responded to DDAVP treatment.  At discharge patient was discharged on DDAVP.  Follow up blood work later on showed hyponatremia on DDAVP therefore DDAVP was discontinued.  He has been off of DDAVP now for over a month.  Last sodium checked without DDAVP is a stable at 139 on 1/18/21.     Going forward monitor for central diabetes insipidus clinically.  Clinical changes or a urine output more than 3 L should prompt a sodium check.  Standing order for BMP placed.      Secondary adrenal insufficiency  Continue Hydrocortisone (HC) 15 mg in the AM, 10mg with dinner.    We will see if we can reduce hydrocortisone further to 15 in the morning and 5 in the afternoon.  The lowest effective dose of hydrocortisone is what is recommended to be used.  - sick day rules - increase hydrocortisone to 2-3X of the maintenance  dose during sickness like flu. Needs injection if unable to keep medication down due to vomiting.   Injectable form of hydrocortisone sent to the pharmacy to be used as needed.      Central hypothyroidism   -continue Levothyroxine 150 mcg daily      Central hypogonadism   - Continue Testosterone   -check Testosterone level in 2-3 months    -Check PSA.    Hypokalemia has resolved.    I will contact the patient with the test results.  Return to clinic in 3 months.  Patient Instructions   Mykel & Savannah   It was a pleasure meeting you.    Central diabetes insipidus   -Follow free water replacement therapy per recent discharge recommendation which was 120 mL every 4 hours.  - change in mental status from baseline should prompt sodium level checks.   - If urine output increases to more than 3 L please check sodium level.   - There is a standing order for Sodium checks as needed.      Secondary adrenal insufficiency  Reduce Hydrocortisone (HC) 15 mg in the AM, 10mg with dinner until his next follow up.  Hydrocortisone 5 mg tablet is sent to your pharmacy for ease of adminstration.   - sick day rules - increase hydrocortisone to 3X of the maintenance dose during sickness like flu.  (for eg. Hydrocortisone to be increased to 15 mg 3 times per day during illness until he gets better). Then after he improves from illness; dose needs to go back to his usual dose of 15 mg in the morning and 10 mg in the afternoon.   -Needs injection form of hydrocortisone if unable to keep medication down due to vomiting.    - Injectable form of hydrocortisone sent to the pharmacy to be used as needed.     Central hypothyroidism   -continue Levothyroxine 150 mcg daily     Central hypogonadism  Continue with current testosterone replacement therapy.    And will recheck follow-up testosterone level and other routine labs in 3 months prior to his next appointment.      Please let us know if any questions.      Test and/or medications prescribed  today:  Orders Placed This Encounter   Procedures     Testosterone total     Basic metabolic panel     T4 free     PSA tumor marker       Faizan Mclean MD  Staff Endocrinologist    Division of Endocrinology and Diabetes      Subjective:         HPI: Keyon Fraias is a 60 year old male with history of panhypopituitarism who is here to establish care.  He was recently discharged from the hospital after been treated for infection.  Patient has had multiple hospitalization over the last 12 months for aspiration pneumonia.    Patient's mom reports today that Mykel is at baseline. Communicates by moving his eyebrows and also verbalizing a word.     Urine output has been stable between 1-1.5 L/day.  Free water administration has been about 250 cc every 4 hours.      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  During his recent hospitalization he was noted to have polyuria with up to 10 L in 24 hours in the setting of elevated sodium level at 168.  Patient responded to multiple DDAVP treatment.  DDAVP was continued briefly however later on was stopped due to hyponatremia.  He has been off of DDAVP for over a month now.  Recently done sodium is stable.  Noted that with infections he gets hyponatremia intermittently.     Secondary adrenal insufficiency  Currently on hydrocortisone (HC) 20mg in the AM, 10mg with dinner.         Central hypothyroidism   Currently on levothyroxine 150 mcg daily        Central hypogonadism   Currently on testosterone injection 76 mg into the medicine every 2 weeks.  Recently done hematocrit has been stable.        Allergies   Allergen Reactions     Valproic Acid Other (See Comments)     Toxicity w/ bone marrow suspension, elevated ammonia levels      Dilantin [Phenytoin Sodium] Other (See Comments)     Severe Trembling     Scopolamine Hives     Hives with the patch - oral no problem       Current  Outpatient Medications   Medication Sig Dispense Refill     acetaminophen (TYLENOL 8 HOUR) 650 MG CR tablet Take 650 mg by mouth every 8 hours as needed for mild pain or fever       acetylcysteine (MUCOMYST) 20 % neb solution Take 2 mLs by nebulization 4 times daily With albuterol at 0700, 1100, 1500, and 1900        albuterol (PROVENTIL) (5 MG/ML) 0.5% neb solution Take 2.5 mg by nebulization every 4 hours (while awake) 0700 1100 1500 1900 with mucomyst        aspirin (ASA) 81 MG chewable tablet 81 mg by Oral or Feeding Tube route daily At 0900       bacitracin ointment Apply topically daily as needed for wound care To PEG site.        Brivaracetam (BRIVIACT) 10 MG/ML solution 100 mg by Oral or Feeding Tube route 2 times daily 0900, 2100       calcium carbonate 1250 MG/5ML SUSP suspension Take 1,250 mg by mouth 3 times daily 0900, 1500, 2100       carBAMazepine (TEGRETOL) 100 MG/5ML suspension Take 100 mg by mouth daily Take at 1800        carBAMazepine (TEGRETOL) 100 MG/5ML suspension 150 mg by Oral or Feeding Tube route 3 times daily At 06:00, 12:00, and 24:00 for seizures       cefuroxime (CEFTIN) 500 MG tablet Take 1 tablet (500 mg) by mouth every 12 hours for 7 doses 7 tablet 0     clotrimazole-betamethasone (LOTRISONE) 1-0.05 % external cream Apply topically 2 times daily as needed        ferrous sulfate 220 (44 Fe) MG/5ML ELIX 220 mg by Per Feeding Tube route daily @ 0900       Guar Gum (FIBER MODULAR, NUTRISOURCE FIBER,) packet 1 packet by Per G Tube route daily 30 packet 0     hydrocortisone (CORTEF) 10 MG tablet 10 mg by Oral or FT or NG tube route daily (with dinner) At 1500       hydrocortisone (CORTEF) 5 MG tablet 3 tablets (15 mg) by Oral or Feeding Tube route every morning During illness please increase the dose as directed. 270 tablet 3     hydrocortisone 1 % CREA cream Place rectally 2 times daily as needed for other Apply to reddened memo areas as needed       hydrocortisone sodium succinate PF  (SOLU-CORTEF) 100 MG injection Inject 1 mL (50 mg) into the muscle once as needed (If unable to keep oral hydrocortisone due to vomiting.) Dispense Act-O-Vial 10 mL 1     levothyroxine (SYNTHROID/LEVOTHROID) 150 MCG tablet Take 150 mcg by mouth every morning At 0500       melatonin (MELATONIN) 1 MG/ML LIQD liquid 6 mg by Per NG tube route At Bedtime        metoclopramide (REGLAN) 10 MG/10ML SOLN solution Take 10 mg by mouth 4 times daily (before meals and nightly) 0800, 1200, 1600, 2000  Disconnects bag before administration, then waits 45 mins before reconnecting after giving the medication       miconazole (MICATIN) 2 % AERP powder Apply topically 2 times daily as needed        mupirocin (BACTROBAN) 2 % external ointment Apply topically 2 times daily as needed        pantoprazole (PROTONIX) 2 mg/mL SUSP suspension 20 mLs (40 mg) by Per J Tube route daily 400 mL 0     potassium & sodium phosphates (NEUTRA-PHOS) 280-160-250 MG Packet Take 1 packet by mouth 3 times daily        prochlorperazine (COMPAZINE) 25 MG suppository Place 1 suppository (25 mg) rectally every 12 hours as needed for nausea or vomiting 15 suppository 0     Scopolamine HBr POWD Dispense #90. Mix contents with small amount of water for admin via J-tube.  Administer 0.8 mg three times each day. 90 Bottle 3     Skin Protectants, Misc. (BALMEX SKIN PROTECTANT) OINT Externally apply topically 2 times daily as needed (irritation) Applay to reddened memo areas twice daily as needed       sodium bicarbonate 650 MG tablet Take 1 tablet (650 mg) by mouth daily 30 tablet 0     testosterone cypionate (DEPOTESTOTERONE CYPIONATE) 200 MG/ML injection Inject 76 mg into the muscle See Admin Instructions Every 2 weeks on Fridays  76 mg or 0.38 mL       vitamin C (ASCORBIC ACID) 1000 MG TABS 1,000 mg by Oral or Feeding Tube route daily        vitamin D3 (CHOLECALCIFEROL) 2000 units (50 mcg) tablet Take 2,000 Units by mouth daily Crush and feed via j-tube @@ 0900          Review of Systems      as per HPI above      Objective:   Mykel is engaged today.  Tries to use words to communicate.  Other exam was not completed as this was virtual visit    In House Labs:   Hemoglobin A1C   Date Value Ref Range Status   08/12/2020 5.9 (H) 0 - 5.6 % Final     Comment:     Normal <5.7% Prediabetes 5.7-6.4%  Diabetes 6.5% or higher - adopted from ADA   consensus guidelines.     08/12/2019 6.1 (H) 0 - 5.6 % Final     Comment:     Normal <5.7% Prediabetes 5.7-6.4%  Diabetes 6.5% or higher - adopted from ADA   consensus guidelines.     05/07/2019 6.1 (H) 0 - 5.6 % Final     Comment:     Normal <5.7% Prediabetes 5.7-6.4%  Diabetes 6.5% or higher - adopted from ADA   consensus guidelines.         T4 Free   Date Value Ref Range Status   11/17/2020 1.18 0.76 - 1.46 ng/dL Final         Creatinine   Date Value Ref Range Status   01/18/2021 0.76 0.66 - 1.25 mg/dL Final   ]  Last Comprehensive Metabolic Panel:  Sodium   Date Value Ref Range Status   01/18/2021 139 133 - 144 mmol/L Final     Potassium   Date Value Ref Range Status   01/18/2021 3.9 3.4 - 5.3 mmol/L Final     Comment:     Specimen slightly hemolyzed, potassium may be falsely elevated     Chloride   Date Value Ref Range Status   01/18/2021 109 94 - 109 mmol/L Final     Carbon Dioxide   Date Value Ref Range Status   01/18/2021 27 20 - 32 mmol/L Final     Anion Gap   Date Value Ref Range Status   01/18/2021 3 3 - 14 mmol/L Final     Glucose   Date Value Ref Range Status   01/18/2021 144 (H) 70 - 99 mg/dL Final     Urea Nitrogen   Date Value Ref Range Status   01/18/2021 12 7 - 30 mg/dL Final     Creatinine   Date Value Ref Range Status   01/18/2021 0.76 0.66 - 1.25 mg/dL Final     GFR Estimate   Date Value Ref Range Status   01/18/2021 >90 >60 mL/min/[1.73_m2] Final     Comment:     Non  GFR Calc  Starting 12/18/2018, serum creatinine based estimated GFR (eGFR) will be   calculated using the Chronic Kidney Disease  Epidemiology Collaboration   (CKD-EPI) equation.       Calcium   Date Value Ref Range Status   01/18/2021 8.5 8.5 - 10.1 mg/dL Final       Video-Visit Details  Type of service:  Video Visit = 18 minutes.   Originating Location (pt. Location): Home  Distant Location (provider location):  [facility]   Platform used for Video Visit: Doximkalyn  More than 50 minutes spent on the date of the encounter doing chart review, history, documentation and further activities as noted above

## 2021-01-20 NOTE — PATIENT INSTRUCTIONS
Mykel & Savannah   It was a pleasure meeting you.    Central diabetes insipidus   -Follow free water replacement therapy per recent discharge recommendation which was 120 mL every 4 hours.  - change in mental status from baseline should prompt sodium level checks.   - If urine output increases to more than 3 L please check sodium level.   - There is a standing order for Sodium checks as needed.      Secondary adrenal insufficiency  Reduce Hydrocortisone (HC) 15 mg in the AM, 10mg with dinner until his next follow up.  Hydrocortisone 5 mg tablet is sent to your pharmacy for ease of adminstration.   - sick day rules - increase hydrocortisone to 3X of the maintenance dose during sickness like flu.  (for eg. Hydrocortisone to be increased to 15 mg 3 times per day during illness until he gets better). Then after he improves from illness; dose needs to go back to his usual dose of 15 mg in the morning and 10 mg in the afternoon.   -Needs injection form of hydrocortisone if unable to keep medication down due to vomiting.    - Injectable form of hydrocortisone sent to the pharmacy to be used as needed.     Central hypothyroidism   -continue Levothyroxine 150 mcg daily     Central hypogonadism  Continue with current testosterone replacement therapy.    And will recheck follow-up testosterone level and other routine labs in 3 months prior to his next appointment.      Please let us know if any questions.

## 2021-01-20 NOTE — PROGRESS NOTES
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  During his hospitalization few months ago; he was noted to have polyuria with up to 10 L in 24 hours in the setting of elevated sodium level at 168.  Patient responded to DDAVP treatment.  At discharge patient was discharged on DDAVP.  Follow up blood work later on showed hyponatremia on DDAVP therefore DDAVP was discontinued.  He has been off of DDAVP now for over a month.  Last sodium checked without DDAVP is a stable at 139 on 1/18/21.     Going forward monitor for central diabetes insipidus clinically.  Clinical changes or a urine output more than 3 L should prompt a sodium check.  Standing order for BMP placed.      Secondary adrenal insufficiency  Continue Hydrocortisone (HC) 15 mg in the AM, 10mg with dinner.    We will see if we can reduce hydrocortisone further to 15 in the morning and 5 in the afternoon.  The lowest effective dose of hydrocortisone is what is recommended to be used.  - sick day rules - increase hydrocortisone to 2-3X of the maintenance dose during sickness like flu. Needs injection if unable to keep medication down due to vomiting.   Injectable form of hydrocortisone sent to the pharmacy to be used as needed.      Central hypothyroidism   -continue Levothyroxine 150 mcg daily      Central hypogonadism   - Continue Testosterone   -check Testosterone level in 2-3 months    -Check PSA.    Hypokalemia has resolved.    I will contact the patient with the test results.  Return to clinic in 3 months.  Patient Instructions   Mykel & Savannah   It was a pleasure meeting you.    Central diabetes  insipidus   -Follow free water replacement therapy per recent discharge recommendation which was 120 mL every 4 hours.  - change in mental status from baseline should prompt sodium level checks.   - If urine output increases to more than 3 L please check sodium level.   - There is a standing order for Sodium checks as needed.      Secondary adrenal insufficiency  Reduce Hydrocortisone (HC) 15 mg in the AM, 10mg with dinner until his next follow up.  Hydrocortisone 5 mg tablet is sent to your pharmacy for ease of adminstration.   - sick day rules - increase hydrocortisone to 3X of the maintenance dose during sickness like flu.  (for eg. Hydrocortisone to be increased to 15 mg 3 times per day during illness until he gets better). Then after he improves from illness; dose needs to go back to his usual dose of 15 mg in the morning and 10 mg in the afternoon.   -Needs injection form of hydrocortisone if unable to keep medication down due to vomiting.    - Injectable form of hydrocortisone sent to the pharmacy to be used as needed.     Central hypothyroidism   -continue Levothyroxine 150 mcg daily     Central hypogonadism  Continue with current testosterone replacement therapy.    And will recheck follow-up testosterone level and other routine labs in 3 months prior to his next appointment.      Please let us know if any questions.      Test and/or medications prescribed today:  Orders Placed This Encounter   Procedures     Testosterone total     Basic metabolic panel     T4 free     PSA tumor marker       Faizan Mclean MD  Staff Endocrinologist    Division of Endocrinology and Diabetes      Subjective:         HPI: Keyon Farias is a 60 year old male with history of panhypopituitarism who is here to establish care.  He was recently discharged from the hospital after been treated for infection.  Patient has had multiple hospitalization over the last 12 months for aspiration pneumonia.    Patient's mom reports today  that Mykel is at baseline. Communicates by moving his eyebrows and also verbalizing a word.     Urine output has been stable between 1-1.5 L/day.  Free water administration has been about 250 cc every 4 hours.      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  During his recent hospitalization he was noted to have polyuria with up to 10 L in 24 hours in the setting of elevated sodium level at 168.  Patient responded to multiple DDAVP treatment.  DDAVP was continued briefly however later on was stopped due to hyponatremia.  He has been off of DDAVP for over a month now.  Recently done sodium is stable.  Noted that with infections he gets hyponatremia intermittently.     Secondary adrenal insufficiency  Currently on hydrocortisone (HC) 20mg in the AM, 10mg with dinner.         Central hypothyroidism   Currently on levothyroxine 150 mcg daily        Central hypogonadism   Currently on testosterone injection 76 mg into the medicine every 2 weeks.  Recently done hematocrit has been stable.        Allergies   Allergen Reactions     Valproic Acid Other (See Comments)     Toxicity w/ bone marrow suspension, elevated ammonia levels      Dilantin [Phenytoin Sodium] Other (See Comments)     Severe Trembling     Scopolamine Hives     Hives with the patch - oral no problem       Current Outpatient Medications   Medication Sig Dispense Refill     acetaminophen (TYLENOL 8 HOUR) 650 MG CR tablet Take 650 mg by mouth every 8 hours as needed for mild pain or fever       acetylcysteine (MUCOMYST) 20 % neb solution Take 2 mLs by nebulization 4 times daily With albuterol at 0700, 1100, 1500, and 1900        albuterol (PROVENTIL) (5 MG/ML) 0.5% neb solution Take 2.5 mg by nebulization every 4 hours (while awake) 0700 1100 1500 1900 with mucomyst        aspirin (ASA) 81 MG chewable tablet 81 mg by Oral or Feeding Tube route daily At 0900        bacitracin ointment Apply topically daily as needed for wound care To PEG site.        Brivaracetam (BRIVIACT) 10 MG/ML solution 100 mg by Oral or Feeding Tube route 2 times daily 0900, 2100       calcium carbonate 1250 MG/5ML SUSP suspension Take 1,250 mg by mouth 3 times daily 0900, 1500, 2100       carBAMazepine (TEGRETOL) 100 MG/5ML suspension Take 100 mg by mouth daily Take at 1800        carBAMazepine (TEGRETOL) 100 MG/5ML suspension 150 mg by Oral or Feeding Tube route 3 times daily At 06:00, 12:00, and 24:00 for seizures       cefuroxime (CEFTIN) 500 MG tablet Take 1 tablet (500 mg) by mouth every 12 hours for 7 doses 7 tablet 0     clotrimazole-betamethasone (LOTRISONE) 1-0.05 % external cream Apply topically 2 times daily as needed        ferrous sulfate 220 (44 Fe) MG/5ML ELIX 220 mg by Per Feeding Tube route daily @ 0900       Guar Gum (FIBER MODULAR, NUTRISOURCE FIBER,) packet 1 packet by Per G Tube route daily 30 packet 0     hydrocortisone (CORTEF) 10 MG tablet 10 mg by Oral or FT or NG tube route daily (with dinner) At 1500       hydrocortisone (CORTEF) 5 MG tablet 3 tablets (15 mg) by Oral or Feeding Tube route every morning During illness please increase the dose as directed. 270 tablet 3     hydrocortisone 1 % CREA cream Place rectally 2 times daily as needed for other Apply to reddened memo areas as needed       hydrocortisone sodium succinate PF (SOLU-CORTEF) 100 MG injection Inject 1 mL (50 mg) into the muscle once as needed (If unable to keep oral hydrocortisone due to vomiting.) Dispense Act-O-Vial 10 mL 1     levothyroxine (SYNTHROID/LEVOTHROID) 150 MCG tablet Take 150 mcg by mouth every morning At 0500       melatonin (MELATONIN) 1 MG/ML LIQD liquid 6 mg by Per NG tube route At Bedtime        metoclopramide (REGLAN) 10 MG/10ML SOLN solution Take 10 mg by mouth 4 times daily (before meals and nightly) 0800, 1200, 1600, 2000  Disconnects bag before administration, then waits 45 mins before  reconnecting after giving the medication       miconazole (MICATIN) 2 % AERP powder Apply topically 2 times daily as needed        mupirocin (BACTROBAN) 2 % external ointment Apply topically 2 times daily as needed        pantoprazole (PROTONIX) 2 mg/mL SUSP suspension 20 mLs (40 mg) by Per J Tube route daily 400 mL 0     potassium & sodium phosphates (NEUTRA-PHOS) 280-160-250 MG Packet Take 1 packet by mouth 3 times daily        prochlorperazine (COMPAZINE) 25 MG suppository Place 1 suppository (25 mg) rectally every 12 hours as needed for nausea or vomiting 15 suppository 0     Scopolamine HBr POWD Dispense #90. Mix contents with small amount of water for admin via J-tube.  Administer 0.8 mg three times each day. 90 Bottle 3     Skin Protectants, Misc. (BALMEX SKIN PROTECTANT) OINT Externally apply topically 2 times daily as needed (irritation) Applay to reddened memo areas twice daily as needed       sodium bicarbonate 650 MG tablet Take 1 tablet (650 mg) by mouth daily 30 tablet 0     testosterone cypionate (DEPOTESTOTERONE CYPIONATE) 200 MG/ML injection Inject 76 mg into the muscle See Admin Instructions Every 2 weeks on Fridays  76 mg or 0.38 mL       vitamin C (ASCORBIC ACID) 1000 MG TABS 1,000 mg by Oral or Feeding Tube route daily        vitamin D3 (CHOLECALCIFEROL) 2000 units (50 mcg) tablet Take 2,000 Units by mouth daily Crush and feed via j-tube @@ 0900         Review of Systems      as per HPI above      Objective:   Mykel is engaged today.  Tries to use words to communicate.  Other exam was not completed as this was virtual visit    In House Labs:   Hemoglobin A1C   Date Value Ref Range Status   08/12/2020 5.9 (H) 0 - 5.6 % Final     Comment:     Normal <5.7% Prediabetes 5.7-6.4%  Diabetes 6.5% or higher - adopted from ADA   consensus guidelines.     08/12/2019 6.1 (H) 0 - 5.6 % Final     Comment:     Normal <5.7% Prediabetes 5.7-6.4%  Diabetes 6.5% or higher - adopted from ADA   consensus  guidelines.     05/07/2019 6.1 (H) 0 - 5.6 % Final     Comment:     Normal <5.7% Prediabetes 5.7-6.4%  Diabetes 6.5% or higher - adopted from ADA   consensus guidelines.         T4 Free   Date Value Ref Range Status   11/17/2020 1.18 0.76 - 1.46 ng/dL Final         Creatinine   Date Value Ref Range Status   01/18/2021 0.76 0.66 - 1.25 mg/dL Final   ]  Last Comprehensive Metabolic Panel:  Sodium   Date Value Ref Range Status   01/18/2021 139 133 - 144 mmol/L Final     Potassium   Date Value Ref Range Status   01/18/2021 3.9 3.4 - 5.3 mmol/L Final     Comment:     Specimen slightly hemolyzed, potassium may be falsely elevated     Chloride   Date Value Ref Range Status   01/18/2021 109 94 - 109 mmol/L Final     Carbon Dioxide   Date Value Ref Range Status   01/18/2021 27 20 - 32 mmol/L Final     Anion Gap   Date Value Ref Range Status   01/18/2021 3 3 - 14 mmol/L Final     Glucose   Date Value Ref Range Status   01/18/2021 144 (H) 70 - 99 mg/dL Final     Urea Nitrogen   Date Value Ref Range Status   01/18/2021 12 7 - 30 mg/dL Final     Creatinine   Date Value Ref Range Status   01/18/2021 0.76 0.66 - 1.25 mg/dL Final     GFR Estimate   Date Value Ref Range Status   01/18/2021 >90 >60 mL/min/[1.73_m2] Final     Comment:     Non  GFR Calc  Starting 12/18/2018, serum creatinine based estimated GFR (eGFR) will be   calculated using the Chronic Kidney Disease Epidemiology Collaboration   (CKD-EPI) equation.       Calcium   Date Value Ref Range Status   01/18/2021 8.5 8.5 - 10.1 mg/dL Final       Video-Visit Details  Type of service:  Video Visit = 18 minutes.   Originating Location (pt. Location): Home  Distant Location (provider location):  [facility]   Platform used for Video Visit: Doximity  More than 50 minutes spent on the date of the encounter doing chart review, history, documentation and further activities as noted above

## 2021-01-21 LAB
BACTERIA SPEC CULT: ABNORMAL
BACTERIA SPEC CULT: NO GROWTH
SPECIMEN SOURCE: ABNORMAL
SPECIMEN SOURCE: NORMAL

## 2021-02-03 ENCOUNTER — THERAPY VISIT (OUTPATIENT)
Dept: OCCUPATIONAL THERAPY | Facility: CLINIC | Age: 61
End: 2021-02-03
Payer: MEDICARE

## 2021-02-03 DIAGNOSIS — M24.541 CONTRACTURE OF HAND JOINT, RIGHT: Primary | ICD-10-CM

## 2021-02-03 PROCEDURE — 97165 OT EVAL LOW COMPLEX 30 MIN: CPT | Mod: GO | Performed by: OCCUPATIONAL THERAPIST

## 2021-02-03 PROCEDURE — 97760 ORTHOTIC MGMT&TRAING 1ST ENC: CPT | Mod: GO | Performed by: OCCUPATIONAL THERAPIST

## 2021-02-03 NOTE — LETTER
DEPARTMENT OF HEALTH AND HUMAN SERVICES  CENTERS FOR MEDICARE & MEDICAID SERVICES    PLAN/UPDATED PLAN OF PROGRESS FOR OUTPATIENT REHABILITATION    PATIENTS NAME:  Keyon Farias   : 1960    PROVIDER NUMBER:  [id number]    HICN:  [id number]     PROVIDER NAME: [facility]    MEDICAL RECORD NUMBER: [id number]     START OF CARE DATE:    SOC Date: 21     TYPE:  OT    PRIMARY/TREATMENT DIAGNOSIS: (Pertinent Medical Diagnosis)  Contracture of hand joint, right    VISITS FROM START OF CARE:  Rxs Used: 1     Hand Therapy Initial Evaluation    Current Date:  2/3/2021  Diagnosis: Hand contracture  DOI: 1989  Precautions: spastic    Subjective:  Keyon Farias is a 60 year old male.    Patient reports symptoms of the right hand which occurred due to head injury and then stroke. Since onset symptoms are Unchanged  General health as reported by patient is poor.  Pertinent medical history includes:Seizures, Smoking, Stroke  Medical allergies:none.  Surgical history: orthopedic.  Medication history: Anti-seizure, Steroids.    Current occupation is no work    Occupational Profile Information:  Right hand dominant  Prior functional level:  personal care attendant  Patient reports symptoms of stiffness/loss of motion  Special tests:  x-ray and CT.    Previous treatment: resting orthosis  Barriers include:requires assistance with dressing, personal hygiene, transfers, mobility  Mobility: Uses wheelchair  Transportation: medical transportation  Currently not working due to present treatment problem    PATIENTS NAME:  Keyon Farias   : 1960    Functional Outcome Measure:   Upper Extremity Functional Index Score:  SCORE:   Column Totals: /80: 0   (A lower score indicates greater disability.)    Objective:  Pain Level (Scale 0-10)  Pt. Nonverbal.  Unable to assess    Edema    None     ROM   Hand contracture    Strength    NT    Assessment/Plan:  Patient's limitations or Problem List includes:   Decreased ROM/motion, Tightness in musculature and Adherence in connective tissue of the right hand which interferes with the patient's ability to perform Self Care Tasks (dressing, eating, bathing, hygiene/toileting) as compared to previous level of function.    Rehab Potential:  Poor - Return to restricted activity    Patient will benefit from skilled Occupational Therapy to decrease contracture and prevent tissue maceration.     Barriers to Learning:  Language  Cognition    Communication Issues:  Patient appears to be able to clearly communicate and understand verbal and written communication and follow directions correctly.  Family member present for session to facilitate follow through of home program.    Chart Review: Simple history review with patient    Identified Performance Deficits: bathing/showering, toileting, dressing, feeding, functional mobility, personal device care, hygiene and grooming, health management and maintenance and sleep    Assessment of Occupational Performance:  5 or more Performance Deficits    Clinical Decision Making (Complexity): Low complexity  PATIENTS NAME:  Keyon Farias   : 1960    Treatment Explanation:  The following has been discussed with the patient:  RX ordered/plan of care  Anticipated outcomes  Possible risks and side effects    Treatment Plan:    Frequency:  1 x visit  Duration:  NA; 1 x visit    Orthotic Fabrication:  Forearm based orthosis  Discharge Plan:  Achieve all LTG.  Independent in home treatment program.  Reach maximal therapeutic benefit.    Home Exercise Program:  Wear orthosis as able to prevent tissue maceration and further contracture    Caregiver Signature/Credentials ______________________________ Date ________       Treating Provider: Layne Rosas, CHRIS, CHT      I have reviewed and certified the need for these services and plan of treatment while under my care.        PHYSICIAN'S SIGNATURE: ____________________________________   "Date___________                    Carlos Gomez MD    Certification period: Beginning of Cert date period: 02/03/21 End of Cert period date: 05/03/21     Functional Level Progress Report: Please see attached \"Goal Flow sheet for Functional level.\"    ___X_____Continue Services or________ DC Services                Service dates: SOC Date: 02/03/21  to present                                                                     "

## 2021-02-03 NOTE — LETTER
DEPARTMENT OF HEALTH AND HUMAN SERVICES  CENTERS FOR MEDICARE & MEDICAID SERVICES    PLAN/UPDATED PLAN OF PROGRESS FOR OUTPATIENT REHABILITATION    PATIENTS NAME:  Keyon Farias   : 1960  PROVIDER NUMBER:  [id number]  HICN:  [id number]  PROVIDER NAME: [facility]  MEDICAL RECORD NUMBER: [id number]   START OF CARE DATE:    SOC Date: 21   TYPE:  OT  PRIMARY/TREATMENT DIAGNOSIS: (Pertinent Medical Diagnosis)  Contracture of hand joint, right    VISITS FROM START OF CARE:  Rxs Used: 1     Hand Therapy Initial Evaluation  Current Date:  2/3/2021  Diagnosis: Hand contracture  DOI: 2021  Precautions: spastic    Subjective:  Keyon Farias is a 60 year old male.  Patient reports symptoms of the right hand which occurred due to head injury and then stroke. Since onset symptoms are Unchanged  General health as reported by patient is poor.  Pertinent medical history includes:Seizures, Smoking, Stroke  Medical allergies:none.  Surgical history: orthopedic.  Medication history: Anti-seizure, Steroids.  Current occupation is no work    Occupational Profile Information:  Right hand dominant  Prior functional level:  personal care attendant  Patient reports symptoms of stiffness/loss of motion  Special tests:  x-ray and CT.    Previous treatment: resting orthosis  Barriers include:requires assistance with dressing, personal hygiene, transfers, mobility  Mobility: Uses wheelchair  Transportation: medical transportation  Currently not working due to present treatment problem  Functional Outcome Measure:   Upper Extremity Functional Index Score:  SCORE:   Column Totals: /80: 0   (A lower score indicates greater disability.)  Objective:  Pain Level (Scale 0-10)  Pt. Nonverbal.  Unable to assess  Edema    None   PATIENTS NAME:  Keyon Farias   : 1960    ROM   Hand contracture  Strength    NT    Assessment/Plan:  Patient's limitations or Problem List includes:  Decreased ROM/motion,  Tightness in musculature and Adherence in connective tissue of the right hand which interferes with the patient's ability to perform Self Care Tasks (dressing, eating, bathing, hygiene/toileting) as compared to previous level of function.    Rehab Potential:  Poor - Return to restricted activity    Patient will benefit from skilled Occupational Therapy to decrease contracture and prevent tissue maceration.     Barriers to Learning:  Language  Cognition    Communication Issues:  Patient appears to be able to clearly communicate and understand verbal and written communication and follow directions correctly.  Family member present for session to facilitate follow through of home program.    Chart Review: Simple history review with patient    Identified Performance Deficits: bathing/showering, toileting, dressing, feeding, functional mobility, personal device care, hygiene and grooming, health management and maintenance and sleep    Assessment of Occupational Performance:  5 or more Performance Deficits    Clinical Decision Making (Complexity): Low complexity    Treatment Explanation:  The following has been discussed with the patient:  RX ordered/plan of care  Anticipated outcomes  Possible risks and side effects    Treatment Plan:    Frequency:  1 x visit  Duration:  NA; 1 x visit  Orthotic Fabrication:  Forearm based orthosis  Discharge Plan:  Achieve all LTG.  Independent in home treatment program.  Reach maximal therapeutic benefit.      PATIENTS NAME:  Keyon Farias   : 1960    Home Exercise Program:  Wear orthosis as able to prevent tissue maceration and further contracture    Discharge Summary - Hand Therapy  Patient did not return to therapy.  Assume all goals were met to patient's satisfaction. D/C from hand therapy.      Caregiver Signature/Credentials ______________________________ Date ________       Treating Provider: Layne Rosas, CHRIS, CHT    I have reviewed and certified the need for these services  "and plan of treatment while under my care.        PHYSICIAN'S SIGNATURE:   ______________________________  Date___________       Mark Garcia PA-C    Certification period: Beginning of Cert date period: 02/03/21 End of Cert period date: 05/03/21     Functional Level Progress Report: Please see attached \"Goal Flow sheet for Functional level.\"    ________ Continue Services or       ___X_____ DC Services                Service dates: SOC Date: 02/03/21  to present                                                                     "

## 2021-02-03 NOTE — PROGRESS NOTES
Hand Therapy Initial Evaluation    Current Date:  2/3/2021    Diagnosis: Hand contracture  DOI: February 4, 2021    Precautions: spastic    Subjective:  Keyon Farias is a 60 year old male.    Patient reports symptoms of the right hand which occurred due to head injury and then stroke. Since onset symptoms are Unchanged  General health as reported by patient is poor.  Pertinent medical history includes:Seizures, Smoking, Stroke  Medical allergies:none.  Surgical history: orthopedic.  Medication history: Anti-seizure, Steroids.    Current occupation is no work    Occupational Profile Information:  Right hand dominant  Prior functional level:  personal care attendant  Patient reports symptoms of stiffness/loss of motion  Special tests:  x-ray and CT.    Previous treatment: resting orthosis  Barriers include:requires assistance with dressing, personal hygiene, transfers, mobility  Mobility: Uses wheelchair  Transportation: medical transportation  Currently not working due to present treatment problem    Functional Outcome Measure:   Upper Extremity Functional Index Score:  SCORE:   Column Totals: /80: 0   (A lower score indicates greater disability.)    Objective:  Pain Level (Scale 0-10)  Pt. Nonverbal.  Unable to assess    Edema    None     ROM   Hand contracture    Strength    NT    Assessment/Plan:  Patient's limitations or Problem List includes:  Decreased ROM/motion, Tightness in musculature and Adherence in connective tissue of the right hand which interferes with the patient's ability to perform Self Care Tasks (dressing, eating, bathing, hygiene/toileting) as compared to previous level of function.    Rehab Potential:  Poor - Return to restricted activity    Patient will benefit from skilled Occupational Therapy to decrease contracture and prevent tissue maceration.     Barriers to Learning:  Language  Cognition    Communication Issues:  Patient appears to be able to clearly communicate and understand verbal  and written communication and follow directions correctly.  Family member present for session to facilitate follow through of home program.    Chart Review: Simple history review with patient    Identified Performance Deficits: bathing/showering, toileting, dressing, feeding, functional mobility, personal device care, hygiene and grooming, health management and maintenance and sleep    Assessment of Occupational Performance:  5 or more Performance Deficits    Clinical Decision Making (Complexity): Low complexity    Treatment Explanation:  The following has been discussed with the patient:  RX ordered/plan of care  Anticipated outcomes  Possible risks and side effects    Treatment Plan:    Frequency:  1 x visit  Duration:  NA; 1 x visit    Orthotic Fabrication:  Forearm based orthosis    Discharge Plan:  Achieve all LTG.  Independent in home treatment program.  Reach maximal therapeutic benefit.    Home Exercise Program:  Wear orthosis as able to prevent tissue maceration and further contracture

## 2021-02-16 ENCOUNTER — APPOINTMENT (OUTPATIENT)
Dept: INTERVENTIONAL RADIOLOGY/VASCULAR | Facility: CLINIC | Age: 61
End: 2021-02-16
Attending: NURSE PRACTITIONER
Payer: MEDICARE

## 2021-02-16 ENCOUNTER — HOSPITAL ENCOUNTER (OUTPATIENT)
Facility: CLINIC | Age: 61
Discharge: HOME | End: 2021-02-16
Attending: RADIOLOGY | Admitting: RADIOLOGY
Payer: MEDICARE

## 2021-02-16 VITALS
TEMPERATURE: 97 F | DIASTOLIC BLOOD PRESSURE: 71 MMHG | RESPIRATION RATE: 16 BRPM | OXYGEN SATURATION: 94 % | SYSTOLIC BLOOD PRESSURE: 126 MMHG | HEART RATE: 94 BPM

## 2021-02-16 DIAGNOSIS — R13.10 DYSPHAGIA, UNSPECIFIED TYPE: ICD-10-CM

## 2021-02-16 PROCEDURE — C1769 GUIDE WIRE: HCPCS

## 2021-02-16 PROCEDURE — 49452 REPLACE G-J TUBE PERC: CPT

## 2021-02-16 PROCEDURE — 999N000163 HC STATISTIC SIMPLE TUBE INSERTION/CHARGE, PORT, CATH, FISTULOGRAM

## 2021-02-16 PROCEDURE — 272N000584 ZZ HC TUBE GASTRO CR13

## 2021-02-16 RX ORDER — HYDROCORTISONE 10 MG/1
5 TABLET ORAL 2 TIMES DAILY
COMMUNITY
Start: 2009-06-05 | End: 2021-04-28

## 2021-02-16 NOTE — PROGRESS NOTES
Care Suites Discharge Nursing Note    Patient Information  Name: Keyon Farias  Age: 60 year old    Discharge Education:  Discharge instructions reviewed: Yes.  AVS/Discharge instructions given and reviewed by Gael Welch RN  Additional education/resources provided: no  Patient/patient representative verbalizes understanding: Yes  Patient discharging on new medications: No  Medication education completed: N/A    Discharge Plans:   Discharge location: home  Discharge ride contacted: Yes  Approximate discharge time: 1320    Discharge Criteria:  Discharge criteria met and vital signs stable: Yes    Patient Belongs:  Patient belongings returned to patient: Yes    Aurora Curry, RN

## 2021-02-16 NOTE — PROGRESS NOTES
Care Suites Admission Nursing Note    Patient Information  Name: Keyon Farias  Age: 60 year old  Reason for admission: g-tube exchange  Care Suites arrival time: 1050 am    Visitor Information  Name: Keyon  Informed of visitor restrictions: Yes  1 visitor allowed per patient   Visitor must screen negative for COVID symptoms   Visitor must wear a mask  Waiting rooms closed to visitors    Patient Admission/Assessment   Pre-procedure assessment complete: Yes  If abnormal assessment/labs, provider notified: Yes  NPO: Yes  Medications held per instructions/orders: Yes  Consent: deferred  If applicable, pregnancy test status: deferred  Patient oriented to room: Yes  Education/questions answered: Yes  Plan/other: g-tube change    Discharge Planning  Discharge name/phone number: lizbeth [email or web address]  Overnight post sedation caregiver: home  Discharge location: home    Gael Welch RN

## 2021-02-16 NOTE — DISCHARGE INSTRUCTIONS
G-tube Exchange Discharge Instructions     After you go home:      You may resume your normal diet    Care of Insertion Site:      For the first 48 hrs, check your puncture site every couple hours while you are awake     You may remove/change the dressing tomorrow    You may shower tomorrow    No tub baths, whirlpools or swimming until your puncture site has fully healed     Activity       You may go back to normal activity in 24 hours    Wait 48 hours before lifting, straining, exercise or other strenuous activity    Medicines:      You may resume all medications    Resume your Warfarin/Coumadin at your regular dose today. Follow up with your provider to have your INR rechecked    Resume your Platelet Inhibitors and Aspirin tomorrow at your regular dose    For minor pain, you may take Acetaminophen (Tylenol) or Ibuprofen (Advil)                 Call the provider who ordered this procedure if:      Blood or fluid is draining from the site    The site is red, swollen, hot, tender or there is foul-smelling drainage    Chills or a fever greater than 101 F (38 C)    Increased pain at the site    Any questions or concerns    Call  911 or go to the Emergency Room if:      Severe pain or trouble breathing    Bleeding that you cannot control      If you have questions call:          [facility] Radiology Dept @ [phone]            Caring for your G-tube    Tube Maintenance:    For ENFit Tubes:      Do NOT over tighten the connections (the purple end & hub connection).      Clean the connecting ends (especially the hub) every day.      If you are unable to disconnect the tubing ends, soak the connection in warm water (or wrap in a wet warm washcloth) to try and loosen the connection.    Possible problems with your tube may include:      Clogged with medications or feedings - most obstructions can be cleared with a small (3cc) syringe and warm water. This may be repeated until the tube is unclogged. This can be  prevented by frequently flushing the tube with water (60cc) during the day and always after medications & feedings.      Tube pulls out or falls out -cover the opening with gauze & tape. Call [phone] for further instructions      Skin breakdown and/or yeast infections at the insertion site - use of skin barrier ointments and anti-fungal powders can treat most site irritations.  Ask your pharmacist or provider for assistance (a prescription is not necessary).    In general, tube problems (including pulled tubes) are NOT emergency situations. Unless the pulling out of a tube is accompanied by uncontrollable bleeding, please DO NOT GO TO THE EMERGENCY ROOM!  Call [phone] with problems.    Tube Care:      Change the gauze dressing every 24 hours and if soiled (dirty).  Stabilize all tubes securely by using gauze and tape.  Clean tube site with soap & water using a cotton applicator (Q-tip) as needed to prevent irritation.    Flush feeding tube with 60cc of warm or room temperature water before and after meds.  To prevent the tube from clogging, ask your provider or pharmacist if liquid forms of your medications are available. If not, crush the pills well & be sure to flush the tube before & after all medications.    Flush feeding tube a minimum of every 4 hours and when feeding is completed with 60cc of water to keep the tube clear and avoid clogging.    Pt may use an abdominal (waist) binder to protect the G-tube.    If there is continued oozing or bleeding, redness, yellow/green/foul smelling drainage    STOP the feedings & use of the tube immediately if there is:      Continued oozing or bleeding at the site    Redness    Yellow/green or foul smelling drainage at the site    Uncontrolled stomach pain    Many of the supplies mentioned above can be purchased at your local pharmacy      For issues with your tube, please call:    [facility] Radiology Dept at [phone]

## 2021-02-16 NOTE — PROGRESS NOTES
Care Suites Post Procedure Note    Patient Information  Name: Keyon Farias  Age: 60 year old    Post Procedure  Time patient returned to Care Suites: 1300  Concerns/abnormal assessment: None at this time. GJ tube site CDI, no swelling.  If abnormal assessment, provider notified: N/A  Plan/Other: Per orders.    Aurora Curry RN

## 2021-02-16 NOTE — PROGRESS NOTES
PATIENT/VISITOR WELLNESS SCREENING    Step 1 Patient Screening    1. In the last month, have you been in contact with someone who was confirmed or suspected to have Coronavirus/COVID-19? No    2. Do you have the following symptoms?  Fever/Chills? No   Cough? No   Shortness of breath? No   New loss of taste or smell? No  Sore throat? No  Muscle or body aches? No  Headaches? No  Fatigue? No  Vomiting or diarrhea? No    Step 2 Visitor Screening    1. Name of Visitor (1 visitor per patient): phyliss mom     2. In the last month, have you been in contact with someone who was confirmed or suspected to have Coronavirus/COVID-19? No    3. Do you have the following symptoms?  Fever/Chills? No   Cough? No   Shortness of breath? No   Skin rash? No   Loss of taste or smell? No  Sore throat? No  Runny or stuffy nose? No  Muscle or body aches? No  Headaches? No  Fatigue? No  Vomiting or diarrhea? No    If the visitor has positive symptoms, notify supervisor/manger  Per policy, the visitor will need to leave the facility     Step 3 Refer to logic grid below for actions    NO SYMPTOM(S)    ACTIONS:  1. Standard rooming process  2. Provider to assess per normal protocol  3. Implement precautions as needed and per guidelines     POSITIVE SYMPTOM(S)  If positive for ANY of the following symptoms: fever, cough, shortness of breath, rash    ACTION:  1. Continue to have the patient wear a mask   2. Room patient as soon as possible  3. Don appropriate PPE when entering room  4. Provider evaluation

## 2021-02-16 NOTE — IR NOTE
Interventional Radiology Intra-procedural Nursing Note    Patient Name: Keyon Farias  Medical Record Number: [id number]  Today's Date: February 16, 2021    Start Time: 1249  End of procedure time: 1253  Procedure: Jejunostomy tube change  Report given to: GALE Malik RN  Time pt departs:  1559  : no    Other Notes: Pt came to IR suite 2 on a cart.  Pt was moved over to the table and was placed in supine position.  Pt was draped and prepped with chlorhexidine soap.        Piotr Rodarte RN

## 2021-02-16 NOTE — PRE-PROCEDURE
GENERAL PRE-PROCEDURE:   Procedure:  Gastro jejunostomy tube replacement (mother/guardian signed the consent)  Date/Time:  2/16/2021 11:30 AM    Verbal consent obtained?: Yes    Risks and benefits: Risks, benefits and alternatives were discussed    Consent given by:  Guardian  Patient states understanding of procedure being performed: Yes    Patient's understanding of procedure matches consent: Yes    Procedure consent matches procedure scheduled: Yes    Appropriately NPO:  Yes

## 2021-02-18 DIAGNOSIS — E23.0 PANHYPOPITUITARISM (H): Primary | ICD-10-CM

## 2021-02-18 DIAGNOSIS — E23.0 PANHYPOPITUITARISM (H): ICD-10-CM

## 2021-02-18 DIAGNOSIS — E29.1 HYPOGONADISM MALE: ICD-10-CM

## 2021-02-18 RX ORDER — TESTOSTERONE CYPIONATE 200 MG/ML
76 INJECTION, SOLUTION INTRAMUSCULAR SEE ADMIN INSTRUCTIONS
Qty: 3 ML | Refills: 1 | Status: SHIPPED | OUTPATIENT
Start: 2021-02-18 | End: 2021-02-18

## 2021-02-18 RX ORDER — TESTOSTERONE CYPIONATE 200 MG/ML
76 INJECTION, SOLUTION INTRAMUSCULAR
Qty: 3 ML | Refills: 1 | Status: SHIPPED | OUTPATIENT
Start: 2021-02-18 | End: 2021-04-28

## 2021-02-18 NOTE — TELEPHONE ENCOUNTER
[facility] Call Center    Phone Message    May a detailed message be left on voicemail: yes     Reason for Call: Medication Refill Request    Has the patient contacted the pharmacy for the refill? Yes   Name of medication being requested: testosterone     Provider who prescribed the medication: unknown    Pharmacy: "[id number] - [place], [place] - [address] AT [facility] [address]    Date medication is needed: asap     Action Taken: Message routed to:  [facility] endo    Travel Screening: Not Applicable

## 2021-02-18 NOTE — TELEPHONE ENCOUNTER
testosterone cypionate (DEPOTESTOTERONE CYPIONATE) 200 MG/ML injection      Historical entry  Last Office Visit : 1/20/21 recommended 3 month follow up  Future Office visit:  None scheduled    Routing refill request to provider for review/approval because:  Drug controlled substance  Medication is reported/historical

## 2021-02-19 ENCOUNTER — HOSPITAL ENCOUNTER (EMERGENCY)
Facility: CLINIC | Age: 61
Discharge: HOME | End: 2021-02-19
Attending: EMERGENCY MEDICINE | Admitting: EMERGENCY MEDICINE
Payer: MEDICARE

## 2021-02-19 ENCOUNTER — NURSE TRIAGE (OUTPATIENT)
Dept: NURSING | Facility: CLINIC | Age: 61
End: 2021-02-19

## 2021-02-19 ENCOUNTER — APPOINTMENT (OUTPATIENT)
Dept: GENERAL RADIOLOGY | Facility: CLINIC | Age: 61
End: 2021-02-19
Attending: EMERGENCY MEDICINE
Payer: MEDICARE

## 2021-02-19 VITALS
SYSTOLIC BLOOD PRESSURE: 117 MMHG | RESPIRATION RATE: 22 BRPM | TEMPERATURE: 98.6 F | DIASTOLIC BLOOD PRESSURE: 71 MMHG | OXYGEN SATURATION: 95 % | HEART RATE: 81 BPM

## 2021-02-19 DIAGNOSIS — R06.9 ABNORMAL BREATHING: ICD-10-CM

## 2021-02-19 DIAGNOSIS — M62.81 GENERALIZED MUSCLE WEAKNESS: ICD-10-CM

## 2021-02-19 LAB
ALBUMIN SERPL-MCNC: 3.2 G/DL (ref 3.4–5)
ALBUMIN UR-MCNC: 10 MG/DL
ALP SERPL-CCNC: 86 U/L (ref 40–150)
ALT SERPL W P-5'-P-CCNC: 26 U/L (ref 0–70)
ANION GAP SERPL CALCULATED.3IONS-SCNC: 6 MMOL/L (ref 3–14)
APPEARANCE UR: CLEAR
AST SERPL W P-5'-P-CCNC: 33 U/L (ref 0–45)
BASE EXCESS BLDV CALC-SCNC: 12.6 MMOL/L
BASOPHILS # BLD AUTO: 0.1 10E9/L (ref 0–0.2)
BASOPHILS NFR BLD AUTO: 1.2 %
BILIRUB SERPL-MCNC: 0.6 MG/DL (ref 0.2–1.3)
BILIRUB UR QL STRIP: NEGATIVE
BUN SERPL-MCNC: 25 MG/DL (ref 7–30)
CALCIUM SERPL-MCNC: 8.8 MG/DL (ref 8.5–10.1)
CHLORIDE SERPL-SCNC: 95 MMOL/L (ref 94–109)
CO2 SERPL-SCNC: 34 MMOL/L (ref 20–32)
COLOR UR AUTO: YELLOW
CREAT SERPL-MCNC: 0.96 MG/DL (ref 0.66–1.25)
D DIMER PPP FEU-MCNC: 0.5 UG/ML FEU (ref 0–0.5)
DIFFERENTIAL METHOD BLD: ABNORMAL
EOSINOPHIL # BLD AUTO: 0.8 10E9/L (ref 0–0.7)
EOSINOPHIL NFR BLD AUTO: 9.9 %
ERYTHROCYTE [DISTWIDTH] IN BLOOD BY AUTOMATED COUNT: 13.2 % (ref 10–15)
FLUAV RNA RESP QL NAA+PROBE: NEGATIVE
FLUBV RNA RESP QL NAA+PROBE: NEGATIVE
GFR SERPL CREATININE-BSD FRML MDRD: 86 ML/MIN/{1.73_M2}
GLUCOSE SERPL-MCNC: 161 MG/DL (ref 70–99)
GLUCOSE UR STRIP-MCNC: NEGATIVE MG/DL
HCO3 BLDV-SCNC: 38 MMOL/L (ref 21–28)
HCT VFR BLD AUTO: 38.2 % (ref 40–53)
HGB BLD-MCNC: 12.6 G/DL (ref 13.3–17.7)
HGB UR QL STRIP: NEGATIVE
IMM GRANULOCYTES # BLD: 0 10E9/L (ref 0–0.4)
IMM GRANULOCYTES NFR BLD: 0.2 %
INTERPRETATION ECG - MUSE: NORMAL
KETONES UR STRIP-MCNC: NEGATIVE MG/DL
LABORATORY COMMENT REPORT: NORMAL
LACTATE BLD-SCNC: 1.8 MMOL/L (ref 0.7–2)
LACTATE BLD-SCNC: 2.4 MMOL/L (ref 0.7–2)
LEUKOCYTE ESTERASE UR QL STRIP: NEGATIVE
LYMPHOCYTES # BLD AUTO: 2.2 10E9/L (ref 0.8–5.3)
LYMPHOCYTES NFR BLD AUTO: 26.4 %
MAGNESIUM SERPL-MCNC: 2.1 MG/DL (ref 1.6–2.3)
MAGNESIUM SERPL-MCNC: 2.5 MG/DL (ref 1.6–2.3)
MCH RBC QN AUTO: 28.8 PG (ref 26.5–33)
MCHC RBC AUTO-ENTMCNC: 33 G/DL (ref 31.5–36.5)
MCV RBC AUTO: 87 FL (ref 78–100)
MONOCYTES # BLD AUTO: 0.8 10E9/L (ref 0–1.3)
MONOCYTES NFR BLD AUTO: 9.8 %
NEUTROPHILS # BLD AUTO: 4.4 10E9/L (ref 1.6–8.3)
NEUTROPHILS NFR BLD AUTO: 52.5 %
NITRATE UR QL: NEGATIVE
NRBC # BLD AUTO: 0 10*3/UL
NRBC BLD AUTO-RTO: 0 /100
O2/TOTAL GAS SETTING VFR VENT: ABNORMAL %
OXYHGB MFR BLDV: 92 %
PCO2 BLDV: 49 MM HG (ref 40–50)
PH BLDV: 7.49 PH (ref 7.32–7.43)
PH UR STRIP: 8 PH (ref 5–7)
PLATELET # BLD AUTO: 154 10E9/L (ref 150–450)
PO2 BLDV: 68 MM HG (ref 25–47)
POTASSIUM SERPL-SCNC: 3.8 MMOL/L (ref 3.4–5.3)
PROT SERPL-MCNC: 7.7 G/DL (ref 6.8–8.8)
RBC # BLD AUTO: 4.38 10E12/L (ref 4.4–5.9)
RBC #/AREA URNS AUTO: 1 /HPF (ref 0–2)
RSV RNA SPEC QL NAA+PROBE: NORMAL
SARS-COV-2 RNA RESP QL NAA+PROBE: NEGATIVE
SODIUM SERPL-SCNC: 135 MMOL/L (ref 133–144)
SOURCE: ABNORMAL
SP GR UR STRIP: 1.02 (ref 1–1.03)
SPECIMEN SOURCE: NORMAL
TROPONIN I SERPL-MCNC: <0.015 UG/L (ref 0–0.04)
UROBILINOGEN UR STRIP-MCNC: 4 MG/DL (ref 0–2)
WBC # BLD AUTO: 8.5 10E9/L (ref 4–11)
WBC #/AREA URNS AUTO: 1 /HPF (ref 0–5)

## 2021-02-19 PROCEDURE — 87636 SARSCOV2 & INF A&B AMP PRB: CPT | Performed by: EMERGENCY MEDICINE

## 2021-02-19 PROCEDURE — 84484 ASSAY OF TROPONIN QUANT: CPT | Performed by: EMERGENCY MEDICINE

## 2021-02-19 PROCEDURE — 85379 FIBRIN DEGRADATION QUANT: CPT | Performed by: EMERGENCY MEDICINE

## 2021-02-19 PROCEDURE — 258N000003 HC RX IP 258 OP 636: Performed by: EMERGENCY MEDICINE

## 2021-02-19 PROCEDURE — 99285 EMERGENCY DEPT VISIT HI MDM: CPT | Mod: 25

## 2021-02-19 PROCEDURE — 80053 COMPREHEN METABOLIC PANEL: CPT | Performed by: EMERGENCY MEDICINE

## 2021-02-19 PROCEDURE — 96360 HYDRATION IV INFUSION INIT: CPT

## 2021-02-19 PROCEDURE — 71045 X-RAY EXAM CHEST 1 VIEW: CPT

## 2021-02-19 PROCEDURE — 82805 BLOOD GASES W/O2 SATURATION: CPT | Performed by: EMERGENCY MEDICINE

## 2021-02-19 PROCEDURE — 85025 COMPLETE CBC W/AUTO DIFF WBC: CPT | Performed by: EMERGENCY MEDICINE

## 2021-02-19 PROCEDURE — 83735 ASSAY OF MAGNESIUM: CPT | Performed by: EMERGENCY MEDICINE

## 2021-02-19 PROCEDURE — C9803 HOPD COVID-19 SPEC COLLECT: HCPCS

## 2021-02-19 PROCEDURE — 83605 ASSAY OF LACTIC ACID: CPT | Mod: 91 | Performed by: EMERGENCY MEDICINE

## 2021-02-19 PROCEDURE — 87040 BLOOD CULTURE FOR BACTERIA: CPT | Performed by: EMERGENCY MEDICINE

## 2021-02-19 PROCEDURE — 93005 ELECTROCARDIOGRAM TRACING: CPT

## 2021-02-19 PROCEDURE — 81001 URINALYSIS AUTO W/SCOPE: CPT | Performed by: EMERGENCY MEDICINE

## 2021-02-19 PROCEDURE — 96361 HYDRATE IV INFUSION ADD-ON: CPT

## 2021-02-19 RX ORDER — SODIUM CHLORIDE 9 MG/ML
INJECTION, SOLUTION INTRAVENOUS CONTINUOUS
Status: DISCONTINUED | OUTPATIENT
Start: 2021-02-19 | End: 2021-02-19 | Stop reason: HOSPADM

## 2021-02-19 RX ADMIN — SODIUM CHLORIDE 1000 ML: 9 INJECTION, SOLUTION INTRAVENOUS at 11:59

## 2021-02-19 NOTE — ED NOTES
Bed: ED20  Expected date:   Expected time:   Means of arrival:   Comments:  Kerri 1 Lethargic 60 m

## 2021-02-19 NOTE — ED TRIAGE NOTES
Brought in by medics for SOB and weakness, pt has no SOB on arrival to ER. Respirations regular and non labored. Pt is non ambulatory and has mother as care giver at home. Medics state pt is more alert than when they have picked him up in the past. Pt is answering yes and no questions appropriately. Redness noted to coccyx - blanchable with brisk cap refill, 3-4mm scab noted to coccyx.

## 2021-02-19 NOTE — ED PROVIDER NOTES
History     Chief Complaint:  Shortness of Breath and Fatigue       The history is provided by the patient and a parent. History limited by: Patient nonverbal      Keyno Farias is a 60 year old non verbal male with a history of TBI, seizures, DVT, CVA, sepsis, recurrent aspiration pneumonia, and encephalopathy among others who presents for evaluation of generalized weakness.  Per discussion with mother/caregiver over the phone, patient seemed to have more rapid breathing since last night. No documented fevers at home, no vomiting or diarrhea. Patient has chronic mild cough. No recent falls. Patient is full code. Patient is not on blood thinners. In the past patient has had PNA with similar symptoms. Patient has not had COVID. Here, the patient shares he is having some mild shortness of breath but denies any current pain.     He has had previous similar symptoms with pneumonia in the past. Of note, he was recently admitted from 01/15/2021 to 01/18/2021 for sepsis due to UTI. The patient is wheelchair-bound and lives with his mother who is his primary caregiver.      Review of Systems   Unable to perform ROS: Patient nonverbal     Allergies:  Valproic acid  Dilantin  Scopolamine - patch only, oral fine     Medications:   Acetylcysteine neb solution   Albuterol neb solution   Aspirin 81 mg  Brivaracetam  Carbamazepine  Calcium carbonate  Ferrous sulfate  Hydrocortisone   Melatonin  Metoclopramide  Levothyroxine  Solu-cortef   Potassium & sodium phosphates  Protonix  Scopolamine  Sodium bicarbonate  Depotestosterone     Medical History:   Aphasia  TBI   GERD  DVT of upper extremity   Seizures  Pneumonia  CVA  Incontinence   Panhypopituitarism  Sepsis due to UTI  Thyroid disease  Cerebral artery occlusion   Ventricular fibrillation  Ventricular tachyarrhythmia  Cardiac arrest  Encephalopathy   SIRS  Aspiration, chronic   Vitamin D deficiency   Bladder calculus   Hyperlipidemia     Surgical History:  Endoscopic ultrasound  upper GI tract  Endoscopic ultrasound, EGD necrosectomy   EGD, combined x3  Head and neck procedure  IR GJ tube change x10  Tracheostomy x2  Insert tube gastrotomy   Appendectomy   Dental extract and restoration   Contracture release - right arm, right ankle   PICC exchange left    Family History:   Father -  Renal cancer     Social History:  The patient was accompanied to the ED by EMS.  The patient lives with his mother.   PCP: Carlos Gomez        Physical Exam     Patient Vitals for the past 24 hrs:   BP Temp Temp src Pulse Resp SpO2   02/19/21 1109 97/77 98.6  F (37  C) Oral 97 24 94 %   02/19/21 1108 -- -- -- -- 18 --   02/19/21 1100 97/77 98.6  F (37  C) Oral 102 -- 95 %          Physical Exam  VS: Reviewed per above  HENT: Mucous membranes dry  EYES: sclera anicteric  CV: Rate as noted, regular rhythm.   RESP: Effort normal. Breath sounds are normal bilaterally.  GI: no tenderness/rebound/guarding, not distended. GJ tube present to upper abdomen with G tube draining gastric fluid into collection bag.  NEURO: Alert, moving left fingers slightly to command but otherwise paretic in the extremities with contracture deformities  MSK: No deformity of the extremities  SKIN: Warm and dry. No rashes    Emergency Department Course     ECG:  ECG taken at 1119  Normal sinus rhythm   Left anterior fascicular block  Prolonged QT    Abnormal ECG   QTC now prolonged as compared to prior, dated 01/15/2021.  Rate 95 bpm. NJ interval 150 ms. QRS duration 92 ms. QT/QTc 410/515 ms. P-R-T axes 50 -57 40.     Imaging:    XR Chest Port 1 View:  IMPRESSION: Shallow inspiration accentuates heart size and pulmonary   vascularity. Chest otherwise negative. No pneumothorax.  Reading per radiology.     Laboratory:    CBC: WBC 8.5, HGB 12.6 (L),   CMP: Carbon Dioxide 34 (H), Glucose 161 (H), Albumin 3.2 (L), (Creatinine 0.96) o/w WNL   Troponin (Collected 1123): <0.015    Lactic Acid (Resulted 1146): 2.4 (H)   Lactic Acid  (Resulted 1335): 1.8   Blood gas venous: pH Venous 7.49 (H), pCO2 Venous 49, pO2 Venous 68 (H), Bicarbonate Venous 38 (H), Base Excess venous 12.6 (A), on room air  D-Dimer: 0.5   Magnesium: 2.1   Blood Culture x2: Pending     UA with Microscopic: pH 8.0 (H), Protein Albumin 10 (A), Urobilinogen 4.0 (H), o/w WNL     Symptomatic Influenza A/B & SARS-CoV2 (COVID-19) Virus PCR Multiplex: Negative     Emergency Department Course:    Reviewed:  I reviewed the patient's nursing notes, vitals, past medical records, Care Everywhere.     Assessments:  1105 I performed an exam of the patient, as documented above.   1109 I spoke on the phone with the patient's mother and primary caregiver, Savannah, to obtain additional history.     Interventions:  1159 Normal Saline 1000 mL IV     Disposition:  The patient was discharged to home.     Impression & Plan     CMS Diagnoses: The Lactic acid level is elevated due to dehydration, at this time there is no sign of severe sepsis or septic shock.    Medical Decision Making:  Patient presents to the ER for evaluation of abnormal breathing, possible increased weakness.  On arrival vital signs are reassuring with normal SPO2 on room air and normal respiratory rate.  There is no fever.  History is limited as patient is nonverbal but hx was obtained from mother/caregiver.  Initial labs show evidence of normal white blood cell count with mild lactic acid elevation which normalized with a liter of IV fluids.  I suspect this was secondary to dehydration.    With respect to possible abnormal breathing or shortness of breath, VBG is similar to prior EKG and troponin not suggestive of ACS.  D-dimer is within normal range, thus I have lower suspicion for occult PE.  Chest x-ray is clear without evidence of pneumonia or obvious pulmonary edema.  COVID-19 and influenza testing were negative.    No evidence of UTI on cath urinalysis.    At this time, there is no evidence for underlying pathology  contributing to symptoms that his mother reported today. Patient is quite well appearing here. Certainly patient has multiple comorbidities and requires close monitoring in the primary care setting.  If worsening, patient should return to the ER for reassessment. Patient was discharged into care of mother.      Covid-19  Keyon Farias was evaluated during a global COVID-19 pandemic, which necessitated consideration that the patient might be at risk for infection with the SARS-CoV-2 virus that causes COVID-19.   Applicable protocols for evaluation were followed during the patient's care.   COVID-19 was considered as part of the patient's evaluation. The plan for testing is:  a test was obtained during this visit.    Diagnosis:     ICD-10-CM    1. Generalized muscle weakness  M62.81 Blood culture     Blood culture   2. Abnormal breathing  R06.9         Scribe Disclosure:  IMelony, am serving as a scribe at 11:01 AM on 2/19/2021 to document services personally performed by Jose G Jones MD based on my observations and the provider's statements to me.      Jose G Jones MD  02/19/21 3309

## 2021-02-19 NOTE — TELEPHONE ENCOUNTER
RN triage  Call from pt mom - no signed consent in chart   Mom states she is pt guardian -- pt is non verbal and sleeping now   Mom states pt is breathing faster -- and noisy breathing   Mom states pt get pneumonia easy   T = 99 ax   No cough   Can hear pt breathing on phone  Advised pt be seen / ED -- mom states she cannot transport -- will call 911   Mela Leon RN  [facility]  Triage Nurse Advisor    COVID 19 Nurse Triage Plan/Patient Instructions    Please be aware that novel coronavirus (COVID-19) may be circulating in the community. If you develop symptoms such as fever, cough, or SOB or if you have concerns about the presence of another infection including coronavirus (COVID-19), please contact your health care provider or visit [email or web address].     Disposition/Instructions    ED Visit recommended. Follow protocol based instructions.     Bring Your Own Device:  Please also bring your smart device(s) (smart phones, tablets, laptops) and their charging cables for your personal use and to communicate with your care team during your visit.  Call to EMS/911 recommended. Follow protocol based instructions.     Bring Your Own Device:  Please also bring your smart device(s) (smart phones, tablets, laptops) and their charging cables for your personal use and to communicate with your care team during your visit.    Thank you for taking steps to prevent the spread of this virus.  o Limit your contact with others.  o Wear a simple mask to cover your cough.  o Wash your hands well and often.    Resources    [facility]: About COVID-19: [email or web address]    CDC: What to Do If You're Sick: www.cdc.gov/coronavirus/2019-ncov/about/steps-when-sick.html    CDC: Ending Home Isolation: www.cdc.gov/coronavirus/2019-ncov/hcp/disposition-in-home-patients.html     CDC: Caring for Someone: www.cdc.gov/coronavirus/2019-ncov/if-you-are-sick/care-for-someone.html     [facility]: Interim Guidance for Hospital Discharge to Home:  www.health.[email or web address].[place]/diseases/coronavirus/hcp/hospdischarge.pdf    [facility] clinical trials (COVID-19 research studies): clinicalaffairs.[facility]/n-clinical-trials     Below are the COVID-19 hotlines at the [facility] ([facility]). Interpreters are available.   o For health questions: Call 604-301-3659 or 1-547.595.9477 (7 a.m. to 7 p.m.)  o For questions about schools and childcare: Call 274-648-9133 or 1-308.244.7684 (7 a.m. to 7 p.m.)                     Reason for Disposition    MODERATE difficulty breathing (e.g., speaks in phrases, SOB even at rest, pulse 100-120) of new onset or worse than normal    Protocols used: BREATHING DIFFICULTY-A-[place]

## 2021-02-22 ENCOUNTER — HOSPITAL ENCOUNTER (OUTPATIENT)
Facility: CLINIC | Age: 61
Setting detail: OBSERVATION
Discharge: HOME HEALTH | End: 2021-02-23
Attending: EMERGENCY MEDICINE | Admitting: HOSPITALIST
Payer: MEDICARE

## 2021-02-22 ENCOUNTER — APPOINTMENT (OUTPATIENT)
Dept: GENERAL RADIOLOGY | Facility: CLINIC | Age: 61
End: 2021-02-22
Attending: EMERGENCY MEDICINE
Payer: MEDICARE

## 2021-02-22 DIAGNOSIS — R50.9 FEVER AND CHILLS: ICD-10-CM

## 2021-02-22 DIAGNOSIS — H10.9 CONJUNCTIVITIS OF RIGHT EYE, UNSPECIFIED CONJUNCTIVITIS TYPE: ICD-10-CM

## 2021-02-22 LAB
ALBUMIN SERPL-MCNC: 3.1 G/DL (ref 3.4–5)
ALBUMIN UR-MCNC: 30 MG/DL
ALP SERPL-CCNC: 85 U/L (ref 40–150)
ALT SERPL W P-5'-P-CCNC: 25 U/L (ref 0–70)
AMORPH CRY #/AREA URNS HPF: ABNORMAL /HPF
ANION GAP SERPL CALCULATED.3IONS-SCNC: 6 MMOL/L (ref 3–14)
APPEARANCE UR: CLEAR
AST SERPL W P-5'-P-CCNC: 20 U/L (ref 0–45)
BASOPHILS # BLD AUTO: 0.1 10E9/L (ref 0–0.2)
BASOPHILS NFR BLD AUTO: 1.2 %
BILIRUB SERPL-MCNC: 0.4 MG/DL (ref 0.2–1.3)
BILIRUB UR QL STRIP: NEGATIVE
BUN SERPL-MCNC: 19 MG/DL (ref 7–30)
CALCIUM SERPL-MCNC: 8.6 MG/DL (ref 8.5–10.1)
CHLORIDE SERPL-SCNC: 103 MMOL/L (ref 94–109)
CO2 SERPL-SCNC: 29 MMOL/L (ref 20–32)
COLOR UR AUTO: YELLOW
CREAT SERPL-MCNC: 0.82 MG/DL (ref 0.66–1.25)
DIFFERENTIAL METHOD BLD: ABNORMAL
EOSINOPHIL # BLD AUTO: 0.8 10E9/L (ref 0–0.7)
EOSINOPHIL NFR BLD AUTO: 8.9 %
ERYTHROCYTE [DISTWIDTH] IN BLOOD BY AUTOMATED COUNT: 13 % (ref 10–15)
FLUAV RNA RESP QL NAA+PROBE: NEGATIVE
FLUBV RNA RESP QL NAA+PROBE: NEGATIVE
GFR SERPL CREATININE-BSD FRML MDRD: >90 ML/MIN/{1.73_M2}
GLUCOSE SERPL-MCNC: 140 MG/DL (ref 70–99)
GLUCOSE UR STRIP-MCNC: NEGATIVE MG/DL
HCT VFR BLD AUTO: 37.3 % (ref 40–53)
HGB BLD-MCNC: 12.1 G/DL (ref 13.3–17.7)
HGB UR QL STRIP: NEGATIVE
IMM GRANULOCYTES # BLD: 0 10E9/L (ref 0–0.4)
IMM GRANULOCYTES NFR BLD: 0.2 %
KETONES UR STRIP-MCNC: NEGATIVE MG/DL
LABORATORY COMMENT REPORT: NORMAL
LACTATE BLD-SCNC: 1.5 MMOL/L (ref 0.7–2)
LEUKOCYTE ESTERASE UR QL STRIP: NEGATIVE
LYMPHOCYTES # BLD AUTO: 2.8 10E9/L (ref 0.8–5.3)
LYMPHOCYTES NFR BLD AUTO: 30.3 %
MCH RBC QN AUTO: 28.9 PG (ref 26.5–33)
MCHC RBC AUTO-ENTMCNC: 32.4 G/DL (ref 31.5–36.5)
MCV RBC AUTO: 89 FL (ref 78–100)
MONOCYTES # BLD AUTO: 0.8 10E9/L (ref 0–1.3)
MONOCYTES NFR BLD AUTO: 8.7 %
NEUTROPHILS # BLD AUTO: 4.6 10E9/L (ref 1.6–8.3)
NEUTROPHILS NFR BLD AUTO: 50.7 %
NITRATE UR QL: NEGATIVE
NRBC # BLD AUTO: 0 10*3/UL
NRBC BLD AUTO-RTO: 0 /100
PH UR STRIP: 8 PH (ref 5–7)
PLATELET # BLD AUTO: 199 10E9/L (ref 150–450)
POTASSIUM SERPL-SCNC: 3.9 MMOL/L (ref 3.4–5.3)
PROCALCITONIN SERPL-MCNC: <0.05 NG/ML
PROT SERPL-MCNC: 7.1 G/DL (ref 6.8–8.8)
RBC # BLD AUTO: 4.19 10E12/L (ref 4.4–5.9)
RBC #/AREA URNS AUTO: 1 /HPF (ref 0–2)
RSV RNA SPEC QL NAA+PROBE: NORMAL
SARS-COV-2 RNA RESP QL NAA+PROBE: NEGATIVE
SODIUM SERPL-SCNC: 138 MMOL/L (ref 133–144)
SOURCE: ABNORMAL
SP GR UR STRIP: 1.02 (ref 1–1.03)
SPECIMEN SOURCE: NORMAL
UROBILINOGEN UR STRIP-MCNC: 4 MG/DL (ref 0–2)
WBC # BLD AUTO: 9.1 10E9/L (ref 4–11)
WBC #/AREA URNS AUTO: 1 /HPF (ref 0–5)

## 2021-02-22 PROCEDURE — 258N000003 HC RX IP 258 OP 636: Performed by: EMERGENCY MEDICINE

## 2021-02-22 PROCEDURE — G0378 HOSPITAL OBSERVATION PER HR: HCPCS

## 2021-02-22 PROCEDURE — 96360 HYDRATION IV INFUSION INIT: CPT

## 2021-02-22 PROCEDURE — 81001 URINALYSIS AUTO W/SCOPE: CPT | Performed by: EMERGENCY MEDICINE

## 2021-02-22 PROCEDURE — 250N000013 HC RX MED GY IP 250 OP 250 PS 637: Mod: GY | Performed by: EMERGENCY MEDICINE

## 2021-02-22 PROCEDURE — 87040 BLOOD CULTURE FOR BACTERIA: CPT | Performed by: EMERGENCY MEDICINE

## 2021-02-22 PROCEDURE — 96361 HYDRATE IV INFUSION ADD-ON: CPT

## 2021-02-22 PROCEDURE — 85025 COMPLETE CBC W/AUTO DIFF WBC: CPT | Performed by: EMERGENCY MEDICINE

## 2021-02-22 PROCEDURE — 84145 PROCALCITONIN (PCT): CPT | Performed by: EMERGENCY MEDICINE

## 2021-02-22 PROCEDURE — 87636 SARSCOV2 & INF A&B AMP PRB: CPT | Performed by: EMERGENCY MEDICINE

## 2021-02-22 PROCEDURE — 87086 URINE CULTURE/COLONY COUNT: CPT | Performed by: EMERGENCY MEDICINE

## 2021-02-22 PROCEDURE — 71045 X-RAY EXAM CHEST 1 VIEW: CPT

## 2021-02-22 PROCEDURE — 99285 EMERGENCY DEPT VISIT HI MDM: CPT | Mod: 25

## 2021-02-22 PROCEDURE — 250N000013 HC RX MED GY IP 250 OP 250 PS 637: Performed by: HOSPITALIST

## 2021-02-22 PROCEDURE — C9803 HOPD COVID-19 SPEC COLLECT: HCPCS

## 2021-02-22 PROCEDURE — 83605 ASSAY OF LACTIC ACID: CPT | Performed by: EMERGENCY MEDICINE

## 2021-02-22 PROCEDURE — 80053 COMPREHEN METABOLIC PANEL: CPT | Performed by: EMERGENCY MEDICINE

## 2021-02-22 PROCEDURE — 99219 PR INITIAL OBSERVATION CARE,LEVEL II: CPT | Performed by: HOSPITALIST

## 2021-02-22 PROCEDURE — 258N000001 HC RX 258: Performed by: HOSPITALIST

## 2021-02-22 RX ORDER — METOCLOPRAMIDE HYDROCHLORIDE 5 MG/5ML
10 SOLUTION ORAL
Status: DISCONTINUED | OUTPATIENT
Start: 2021-02-22 | End: 2021-02-23 | Stop reason: HOSPADM

## 2021-02-22 RX ORDER — ONDANSETRON 4 MG/1
4 TABLET, ORALLY DISINTEGRATING ORAL EVERY 6 HOURS PRN
Status: DISCONTINUED | OUTPATIENT
Start: 2021-02-22 | End: 2021-02-23 | Stop reason: HOSPADM

## 2021-02-22 RX ORDER — CARBAMAZEPINE 100 MG/5ML
100 SUSPENSION ORAL DAILY
Status: DISCONTINUED | OUTPATIENT
Start: 2021-02-22 | End: 2021-02-23 | Stop reason: HOSPADM

## 2021-02-22 RX ORDER — GUAR GUM
1 PACKET (EA) ORAL DAILY
Status: ON HOLD | COMMUNITY
End: 2021-11-13

## 2021-02-22 RX ORDER — BACITRACIN ZINC 500 [USP'U]/G
OINTMENT TOPICAL DAILY PRN
Status: DISCONTINUED | OUTPATIENT
Start: 2021-02-22 | End: 2021-02-23 | Stop reason: HOSPADM

## 2021-02-22 RX ORDER — PROCHLORPERAZINE 25 MG
25 SUPPOSITORY, RECTAL RECTAL EVERY 12 HOURS PRN
Status: DISCONTINUED | OUTPATIENT
Start: 2021-02-22 | End: 2021-02-23 | Stop reason: HOSPADM

## 2021-02-22 RX ORDER — SODIUM BICARBONATE 650 MG/1
650 TABLET ORAL DAILY
Status: DISCONTINUED | OUTPATIENT
Start: 2021-02-23 | End: 2021-02-23 | Stop reason: HOSPADM

## 2021-02-22 RX ORDER — POLYMYXIN B SULFATE AND TRIMETHOPRIM 1; 10000 MG/ML; [USP'U]/ML
1 SOLUTION OPHTHALMIC EVERY 6 HOURS
COMMUNITY
Start: 2021-02-21 | End: 2021-05-23

## 2021-02-22 RX ORDER — ALBUTEROL SULFATE 0.83 MG/ML
2.5 SOLUTION RESPIRATORY (INHALATION) 4 TIMES DAILY
Status: DISCONTINUED | OUTPATIENT
Start: 2021-02-23 | End: 2021-02-23 | Stop reason: HOSPADM

## 2021-02-22 RX ORDER — CLOTRIMAZOLE AND BETAMETHASONE DIPROPIONATE 10; .64 MG/G; MG/G
CREAM TOPICAL 2 TIMES DAILY PRN
Status: DISCONTINUED | OUTPATIENT
Start: 2021-02-22 | End: 2021-02-23 | Stop reason: HOSPADM

## 2021-02-22 RX ORDER — ACETAMINOPHEN 650 MG/1
650 SUPPOSITORY RECTAL EVERY 4 HOURS PRN
Status: DISCONTINUED | OUTPATIENT
Start: 2021-02-22 | End: 2021-02-23 | Stop reason: HOSPADM

## 2021-02-22 RX ORDER — ACETAMINOPHEN 325 MG/10.15ML
650 LIQUID ORAL EVERY 4 HOURS PRN
Status: DISCONTINUED | OUTPATIENT
Start: 2021-02-22 | End: 2021-02-23 | Stop reason: HOSPADM

## 2021-02-22 RX ORDER — LEVOTHYROXINE SODIUM 75 UG/1
150 TABLET ORAL
Status: DISCONTINUED | OUTPATIENT
Start: 2021-02-23 | End: 2021-02-23 | Stop reason: HOSPADM

## 2021-02-22 RX ORDER — ASPIRIN 81 MG/1
81 TABLET, CHEWABLE ORAL DAILY
Status: DISCONTINUED | OUTPATIENT
Start: 2021-02-23 | End: 2021-02-23 | Stop reason: HOSPADM

## 2021-02-22 RX ORDER — ONDANSETRON 2 MG/ML
4 INJECTION INTRAMUSCULAR; INTRAVENOUS EVERY 6 HOURS PRN
Status: DISCONTINUED | OUTPATIENT
Start: 2021-02-22 | End: 2021-02-23 | Stop reason: HOSPADM

## 2021-02-22 RX ORDER — ASCORBIC ACID 500 MG
1000 TABLET ORAL DAILY
Status: DISCONTINUED | OUTPATIENT
Start: 2021-02-23 | End: 2021-02-23 | Stop reason: HOSPADM

## 2021-02-22 RX ORDER — CARBAMAZEPINE 100 MG/5ML
150 SUSPENSION ORAL 3 TIMES DAILY
Status: DISCONTINUED | OUTPATIENT
Start: 2021-02-23 | End: 2021-02-23 | Stop reason: HOSPADM

## 2021-02-22 RX ORDER — METOCLOPRAMIDE 5 MG/1
10 TABLET ORAL ONCE
Status: COMPLETED | OUTPATIENT
Start: 2021-02-22 | End: 2021-02-22

## 2021-02-22 RX ORDER — POLYMYXIN B SULFATE AND TRIMETHOPRIM 1; 10000 MG/ML; [USP'U]/ML
1 SOLUTION OPHTHALMIC 4 TIMES DAILY
Status: DISCONTINUED | OUTPATIENT
Start: 2021-02-22 | End: 2021-02-23 | Stop reason: HOSPADM

## 2021-02-22 RX ORDER — ACETAMINOPHEN 325 MG/10.15ML
650 LIQUID ORAL ONCE
Status: COMPLETED | OUTPATIENT
Start: 2021-02-22 | End: 2021-02-22

## 2021-02-22 RX ORDER — ACETYLCYSTEINE 200 MG/ML
2 SOLUTION ORAL; RESPIRATORY (INHALATION) 4 TIMES DAILY
Status: DISCONTINUED | OUTPATIENT
Start: 2021-02-23 | End: 2021-02-23 | Stop reason: HOSPADM

## 2021-02-22 RX ORDER — HYDROCORTISONE 5 MG/1
10 TABLET ORAL
Status: DISCONTINUED | OUTPATIENT
Start: 2021-02-22 | End: 2021-02-23 | Stop reason: HOSPADM

## 2021-02-22 RX ORDER — SENNOSIDES 8.6 MG
650 CAPSULE ORAL EVERY 8 HOURS PRN
Status: DISCONTINUED | OUTPATIENT
Start: 2021-02-22 | End: 2021-02-22 | Stop reason: RX

## 2021-02-22 RX ORDER — CALCIUM CARBONATE 1250 MG/5ML
1250 SUSPENSION ORAL 3 TIMES DAILY
Status: DISCONTINUED | OUTPATIENT
Start: 2021-02-22 | End: 2021-02-23 | Stop reason: HOSPADM

## 2021-02-22 RX ORDER — CHOLECALCIFEROL (VITAMIN D3) 50 MCG
50 TABLET ORAL DAILY
Status: DISCONTINUED | OUTPATIENT
Start: 2021-02-23 | End: 2021-02-23 | Stop reason: HOSPADM

## 2021-02-22 RX ADMIN — BRIVARACETAM 100 MG: 10 SOLUTION ORAL at 22:22

## 2021-02-22 RX ADMIN — CARBAMAZEPINE 100 MG: 100 SUSPENSION ORAL at 22:22

## 2021-02-22 RX ADMIN — ACETAMINOPHEN 650 MG: 325 SUSPENSION ORAL at 16:14

## 2021-02-22 RX ADMIN — HYDROCORTISONE 10 MG: 5 TABLET ORAL at 22:35

## 2021-02-22 RX ADMIN — POLYMYXIN B SULFATE AND TRIMETHOPRIM 1 DROP: 1; 10000 SOLUTION OPHTHALMIC at 22:37

## 2021-02-22 RX ADMIN — CALCIUM CARBONATE 1250 MG: 1250 SUSPENSION ORAL at 22:21

## 2021-02-22 RX ADMIN — SODIUM CHLORIDE 1000 ML: 9 INJECTION, SOLUTION INTRAVENOUS at 14:17

## 2021-02-22 RX ADMIN — METOCLOPRAMIDE 10 MG: 5 TABLET ORAL at 22:36

## 2021-02-22 RX ADMIN — DEXTROSE AND SODIUM CHLORIDE: 5; 450 INJECTION, SOLUTION INTRAVENOUS at 22:17

## 2021-02-22 RX ADMIN — POTASSIUM & SODIUM PHOSPHATES POWDER PACK 280-160-250 MG 1 PACKET: 280-160-250 PACK at 22:21

## 2021-02-22 ASSESSMENT — MIFFLIN-ST. JEOR: SCORE: 1590.57

## 2021-02-22 NOTE — ED NOTES
"[facility]  ED Nurse Handoff Report    ED Chief complaint: Fever      ED Diagnosis:   Final diagnoses:   Fever and chills   Conjunctivitis of right eye, unspecified conjunctivitis type       Code Status: Full Code    Allergies:   Allergies   Allergen Reactions     Valproic Acid Other (See Comments)     Toxicity w/ bone marrow suspension, elevated ammonia levels      Dilantin [Phenytoin Sodium] Other (See Comments)     Severe Trembling     Scopolamine Hives     Hives with the patch - oral no problem       Patient Story: fever  Focused Assessment:  Pt was brought in for a fever. Pt will be admitted for observation, per mother request    Treatments and/or interventions provided: IVF  Patient's response to treatments and/or interventions: good    To be done/followed up on inpatient unit:  monitor    Does this patient have any cognitive concerns?: none    Activity level - Baseline/Home:  Unknown  Activity Level - Current:   Unknown    Patient's Preferred language: English   Needed?: No    Isolation: None and Contact   Infection: Not Applicable  MRSA  Patient tested for COVID 19 prior to admission: YES  Bariatric?: No    Vital Signs:   Vitals:    02/22/21 1357 02/22/21 1359 02/22/21 1400 02/22/21 1430   BP: 115/87 115/87 111/87 113/72   Pulse: 102 94 95 93   Resp: 16 18     Temp: 99.7  F (37.6  C) 100.5  F (38.1  C)     TempSrc: Oral Oral     SpO2: 92% 94% 94% 93%   Weight:  74.8 kg (165 lb)     Height:  1.803 m (5' 11\")         Cardiac Rhythm:     Was the PSS-3 completed:   Yes  What interventions are required if any?               Family Comments: none here  OBS brochure/video discussed/provided to patient/family: Yes              Name of person given brochure if not patient: mother              Relationship to patient: mother    For the majority of the shift this patient's behavior was Green.   Behavioral interventions performed were none.    ED NURSE PHONE NUMBER: *[phone]         "
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
## 2021-02-22 NOTE — PHARMACY-ADMISSION MEDICATION HISTORY
Pharmacy Medication History  Admission medication history interview status for the 2/22/2021  admission is complete. See EPIC admission navigator for prior to admission medications     Location of Interview: Phone  Medication history sources: Patient's family/friend ([place]), [place]  Significant changes made to the medication list:  Added new eye drop: Poly-trim to Right eye 4 times daily for 5 days - started this 2/21 PM. Only had 1 dose today 2/22.     In the past week, patient estimated taking medication this percent of the time: greater than 90%    Additional medication history information:   none    Medication reconciliation completed by provider prior to medication history? No    Time spent in this activity: 25 minutes    Prior to Admission medications    Medication Sig Last Dose Taking? Auth Provider   acetaminophen (TYLENOL 8 HOUR) 650 MG CR tablet Take 650 mg by mouth every 8 hours as needed for mild pain or fever  at prn Yes Unknown, Entered By History   acetylcysteine (MUCOMYST) 20 % neb solution Take 2 mLs by nebulization 4 times daily With albuterol at 0700, 1100, 1500, and 1900  2/21/2021 at Unknown time Yes Unknown, Entered By History   albuterol (PROVENTIL) (5 MG/ML) 0.5% neb solution Take 2.5 mg by nebulization every 4 hours (while awake) 0700 1100 1500 1900 with mucomyst  2/21/2021 at Unknown time Yes Unknown, Entered By History   aspirin (ASA) 81 MG chewable tablet 81 mg by Oral or Feeding Tube route daily At 0900 2/22/2021 at 0900 Yes Unknown, Entered By History   bacitracin ointment Apply topically daily as needed for wound care To PEG site.   at prn Yes Unknown, Entered By History   Brivaracetam (BRIVIACT) 10 MG/ML solution 100 mg by Oral or Feeding Tube route 2 times daily 0900, 2100 2/22/2021 at 0900 Yes Unknown, Entered By History   calcium carbonate 1250 MG/5ML SUSP suspension Take 1,250 mg by mouth 3 times daily 0900, 1500, 2100 2/22/2021 at 0900 Yes Unknown, Entered By History    carBAMazepine (TEGRETOL) 100 MG/5ML suspension Take 100 mg by mouth daily Take at 1800  2/21/2021 at 1800 Yes Unknown, Entered By History   carBAMazepine (TEGRETOL) 100 MG/5ML suspension 150 mg by Oral or Feeding Tube route 3 times daily At 06:00, 12:00, and 24:00 for seizures 2/22/2021 at 1200 Yes Unknown, Entered By History   clotrimazole-betamethasone (LOTRISONE) 1-0.05 % external cream Apply topically 2 times daily as needed   at prn Yes Leticia Santiago MD   ferrous sulfate 220 (44 Fe) MG/5ML ELIX 220 mg by Per Feeding Tube route daily @ 0900 2/22/2021 at 0900 Yes Unknown, Entered By History   Guar Gum (FIBER MODULAR, NUTRISOURCE FIBER,) packet 1 packet by Per Feeding Tube route daily 2/22/2021 at am Yes Unknown, Entered By History   hydrocortisone (CORTEF) 10 MG tablet 5 mg by Oral or Feeding Tube route 2 times daily Take one 5 mg pill by NG at 1500 and 3 x 5 mg pill by ND at 2100. 2/21/2021 at 2100 Yes Reported, Patient   hydrocortisone (CORTEF) 5 MG tablet 3 tablets (15 mg) by Oral or Feeding Tube route every morning During illness please increase the dose as directed. 2/22/2021 at 0900 Yes Faizan Mclean MD   hydrocortisone 1 % CREA cream Place rectally 2 times daily as needed for other Apply to reddened memo areas as needed  at prn Yes Unknown, Entered By History   hydrocortisone sodium succinate PF (SOLU-CORTEF) 100 MG injection Inject 1 mL (50 mg) into the muscle once as needed (If unable to keep oral hydrocortisone due to vomiting.) Dispense Act-O-Vial  at prn Yes Faizan Mclean MD   levothyroxine (SYNTHROID/LEVOTHROID) 150 MCG tablet Take 150 mcg by mouth every morning At 0500 2/22/2021 at 0500 Yes Unknown, Entered By History   melatonin (MELATONIN) 1 MG/ML LIQD liquid 6 mg by Per NG tube route At Bedtime  2/21/2021 at 2100 Yes Unknown, Entered By History   metoclopramide (REGLAN) 10 MG/10ML SOLN solution Take 10 mg by mouth 4 times daily (before meals and nightly) 0800, 1200, 1600,  2000  Disconnects bag before administration, then waits 45 mins before reconnecting after giving the medication 2/22/2021 at 1200 Yes Unknown, Entered By History   miconazole (MICATIN) 2 % AERP powder Apply topically 2 times daily as needed   at prn Yes Unknown, Entered By History   mupirocin (BACTROBAN) 2 % external ointment Apply topically 2 times daily as needed   at prn Yes Reported, Patient   pantoprazole (PROTONIX) 2 mg/mL SUSP suspension 20 mLs (40 mg) by Per J Tube route daily 2/22/2021 at 0900 Yes Alvaro Barahona MD   potassium & sodium phosphates (NEUTRA-PHOS) 280-160-250 MG Packet Take 1 packet by mouth 3 times daily  2/22/2021 at 1200 Yes Yanely Liriano MD   prochlorperazine (COMPAZINE) 25 MG suppository Place 1 suppository (25 mg) rectally every 12 hours as needed for nausea or vomiting  at prn Yes Alvaro Barahona MD   Scopolamine HBr POWD Dispense #90. Mix contents with small amount of water for admin via J-tube.  Administer 0.8 mg three times each day. 2/22/2021 at 1200 Yes Jennie Bermudez MD   Skin Protectants, Misc. (BALMEX SKIN PROTECTANT) OINT Externally apply topically 2 times daily as needed (irritation) Applay to reddened memo areas twice daily as needed  at prn Yes Unknown, Entered By History   sodium bicarbonate 650 MG tablet Take 1 tablet (650 mg) by mouth daily 2/22/2021 at 0900 Yes Ricky Torres MD   trimethoprim-polymyxin b (POLYTRIM) 00813-8.1 UNIT/ML-% ophthalmic solution Place 1 drop into the right eye every 6 hours For 5 days 2/22/2021 at x1 Yes Unknown, Entered By History   vitamin C (ASCORBIC ACID) 1000 MG TABS 1,000 mg by Oral or Feeding Tube route daily  2/22/2021 at 0900 Yes Unknown, Entered By History   vitamin D3 (CHOLECALCIFEROL) 2000 units (50 mcg) tablet Take 2,000 Units by mouth daily Crush and feed via j-tube @@ 0900 2/22/2021 at 0900 Yes Unknown, Entered By History   testosterone cypionate (DEPOTESTOSTERONE) 200 MG/ML injection Inject 0.38 mLs (76 mg) into  the muscle every 14 days 2/18/2021  Faizan Mclean MD       The information provided in this note is only as accurate as the sources available at the time of update(s)     Myah Adler PharmD  Inpatient Clinical Pharmacist  [phone]

## 2021-02-22 NOTE — ED PROVIDER NOTES
History     Chief Complaint:  Fever    HPI  Keyon Farias is a 60 year old male with a history of TBI, aphasia, severe sepsis, seizures, cardiac arrest, DVT, pancreatitis, and acid reflux who presents for evaluation of fever. Seen here a few days ago for generalized weakness and breathing difficulties. Workup unremarkable so sent home. Over the weekend mother reports fever of 99.9 and managing with tylenol. No other new symptoms other than started on polytrim for right eye drainage and redness.     Review of Systems   Limited ROS due to aphasia/TBI. Limited ROS obtained from mother over the phone - denies vomiting, new cough, abdominal pain, vomiting, urinary problems  + fever, eye redness    Allergies:  Valproic Acid  Dilantin [Phenytoin Sodium]  Scopolamine    Medications:    Albuterol  Apirin  Brivaracetam   Tegretol  Calcium carbonate  Ferous sulfate  Hydrocortisone tablet  Synthroid  Melatonin  Reglan  Protonix  Compazine  Sodium bicarbonate    Past Medical History:    Appendicitis  Panhypopituitarism   Seizure   Severe Sepsis  Pneumonia of both lower lobes due to infectious organism  Intractable epilepsy   Hyponatremia  PEG tube malfunction  Cardiac arrest  Acute respiratory failure with hypoxia  Necrotizing pancreatitis  Encephalopathy  Acid reflux  Systemic inflammatory response syndrome  Aspiration, chronic pulmonary  Transient alteration of awareness  Urinary tract infection   Contracture of hand joint, right  TBI with resulting aphasia   DVT  Thyroid disease  Cerebral artery occlusion   Ventricular fibrillation     Past Surgical History:    Orthopedic surgery  Head and neck surgery  Vascular surgery  Laparoscopic appendectomy   Tracheostomy  Laparoscopic assisted insertion tube gastrotomy  Necrosectomy  IR Gastro Jejunostomy Tube   IR PICC Exchange Left     Family History:    Cancer in his father    Social History:  The patient arrives alone  Receives home health help     Physical Exam     Patient Vitals  "for the past 24 hrs:   BP Temp Temp src Pulse Resp SpO2 Height Weight   02/22/21 1430 113/72 -- -- 93 -- 93 % -- --   02/22/21 1400 111/87 -- -- 95 -- 94 % -- --   02/22/21 1359 115/87 100.5  F (38.1  C) Oral 94 18 94 % 1.803 m (5' 11\") 74.8 kg (165 lb)   02/22/21 1357 115/87 99.7  F (37.6  C) Oral 102 16 92 % -- --     Physical Exam    General: Sitting up in bed  Eyes:  The pupils are equal and round    Seems to be moving right eye in full ROM    Mild conjunctival injection and drainage    No periorbital erythema    When asked about vision difficulties, he denies this  ENT:    Dry oral mucosa  Neck:  Normal range of motion  CV:  Regular rate, regular rhythm     Skin warm and well perfused   Resp:  Non labored breathing on room air    No tachypnea    No cough heard    Lungs diminished in bases  GI:  Abdomen is soft, there is no rigidity    No distension    No rebound tenderness     No abdominal tenderness    GJ tube site c/d/i  MS:  Normal muscular tone  Skin:  No rash or acute skin lesions noted  Neuro:   Awake, alert.      Nonverbal    Face is symmetric.     Contracture deformities, slightly moving fingers  Psych: Normal affect.  Appropriate interactions.    Emergency Department Course     Imaging:  XR Chest Port 1 view   IMPRESSION: Lung volumes are persistently low and are lower than on   the prior study. No pleural fluid or pneumothorax. Bibasilar opacities   represent atelectasis, though underlying airspace disease cannot be   excluded. The cardiomediastinal silhouette is not well assessed  Reading per radiology    Laboratory:    CBC: (WBC 9.1, HGB 12.1 (L), )   CMP: Glucose 140 (H), Albumin: 3.1 (L), o/w WNL (Creatinine: 0.82)    Lactic acid (1413): 1.5  Blood Cultures x2: Pending  Procalcitonin: <0.015    UA: pH: 8(H), Protein Albumin: 30(!), Urobilinogen: 4(H), Amorphous Crystals: Few(!), o/w Negative  Urine Culture: Pending    Symptomatic Influenza A/B antigen & COVID-19 PCR: negative     Emergency " Department Course:    Reviewed:  I reviewed nursing notes, vitals, past medical history and care everywhere    Assessments:  1418 I obtained history and examined the patient as noted above.     Interventions:  1417 NS 1L IV Bolus  1614 Tylenol 650 mg PO    Disposition:  The patient was admitted to the hospital under the care of Dr. Lazcano.     Impression & Plan        Medical Decision Making:  Keyon Farias is a 60 year old male who presents to the emergency department today for evaluation of fever. Temperature of max 100.5 in ED. No clear source of infection found on workup other than right eye conjunctivitis which should not cause fever. Does not seem to have vision difficulties from what I can tell. Is rubbing his eye a little when I open the eyelid. Is on polytrim for this. No evidence of periorbital cellulitis. No abdominal tenderness. Xray with possible atelectasis though infiltrates not excluded. No new cough at home per mom. No UTI. Discussed with hospitalist for admission. Antibiotics held off with discussion with hospitalist but hospitalist may start them. He looks well in ED today with otherwise normal vital signs. But has history of becoming very ill so will monitor in hospital and mother in agreement with this plan.    Covid-19  Keyon Farias was evaluated during a global COVID-19 pandemic, which necessitated consideration that the patient might be at risk for infection with the SARS-CoV-2 virus that causes COVID-19.   Applicable protocols for evaluation were followed during the patient's care.   COVID-19 was considered as part of the patient's evaluation. The plan for testing is:  a test was obtained during this visit.    Diagnosis:    ICD-10-CM    1. Fever and chills  R50.9    2. Conjunctivitis of right eye, unspecified conjunctivitis type  H10.9      Scribe Disclosure:  Mima DYKES, am serving as a scribe at 2:18 PM on 2/22/2021 to document services personally performed by Tamara Thomas,  MD based on my observations and the provider's statements to me.    Scribe Disclosure:  I, Magdaleno Martinez, am serving as a scribe at 4:27 PM on 2/22/2021 to document services personally performed by Tamara Thomas MD based on my observations and the provider's statements to me.       [facility] EMERGENCY DEPT     Tamara Thomas MD  02/22/21 1741

## 2021-02-23 VITALS
RESPIRATION RATE: 16 BRPM | HEIGHT: 71 IN | OXYGEN SATURATION: 96 % | TEMPERATURE: 97.4 F | DIASTOLIC BLOOD PRESSURE: 52 MMHG | SYSTOLIC BLOOD PRESSURE: 106 MMHG | HEART RATE: 78 BPM | WEIGHT: 165 LBS | BODY MASS INDEX: 23.1 KG/M2

## 2021-02-23 LAB
BACTERIA SPEC CULT: NO GROWTH
BASOPHILS # BLD AUTO: 0.1 10E9/L (ref 0–0.2)
BASOPHILS NFR BLD AUTO: 1.4 %
DIFFERENTIAL METHOD BLD: ABNORMAL
EOSINOPHIL # BLD AUTO: 0.8 10E9/L (ref 0–0.7)
EOSINOPHIL NFR BLD AUTO: 12 %
ERYTHROCYTE [DISTWIDTH] IN BLOOD BY AUTOMATED COUNT: 12.8 % (ref 10–15)
GLUCOSE BLDC GLUCOMTR-MCNC: 112 MG/DL (ref 70–99)
GLUCOSE BLDC GLUCOMTR-MCNC: 114 MG/DL (ref 70–99)
GLUCOSE BLDC GLUCOMTR-MCNC: 158 MG/DL (ref 70–99)
GLUCOSE BLDC GLUCOMTR-MCNC: 167 MG/DL (ref 70–99)
HCT VFR BLD AUTO: 36.6 % (ref 40–53)
HGB BLD-MCNC: 12.1 G/DL (ref 13.3–17.7)
IMM GRANULOCYTES # BLD: 0 10E9/L (ref 0–0.4)
IMM GRANULOCYTES NFR BLD: 0.3 %
LYMPHOCYTES # BLD AUTO: 2.2 10E9/L (ref 0.8–5.3)
LYMPHOCYTES NFR BLD AUTO: 33.5 %
Lab: NORMAL
MCH RBC QN AUTO: 29.2 PG (ref 26.5–33)
MCHC RBC AUTO-ENTMCNC: 33.1 G/DL (ref 31.5–36.5)
MCV RBC AUTO: 88 FL (ref 78–100)
MONOCYTES # BLD AUTO: 0.6 10E9/L (ref 0–1.3)
MONOCYTES NFR BLD AUTO: 9.5 %
NEUTROPHILS # BLD AUTO: 2.9 10E9/L (ref 1.6–8.3)
NEUTROPHILS NFR BLD AUTO: 43.3 %
NRBC # BLD AUTO: 0 10*3/UL
NRBC BLD AUTO-RTO: 0 /100
PLATELET # BLD AUTO: 188 10E9/L (ref 150–450)
RBC # BLD AUTO: 4.14 10E12/L (ref 4.4–5.9)
SPECIMEN SOURCE: NORMAL
WBC # BLD AUTO: 6.6 10E9/L (ref 4–11)

## 2021-02-23 PROCEDURE — G0378 HOSPITAL OBSERVATION PER HR: HCPCS

## 2021-02-23 PROCEDURE — 99217 PR OBSERVATION CARE DISCHARGE: CPT | Performed by: HOSPITALIST

## 2021-02-23 PROCEDURE — G0463 HOSPITAL OUTPT CLINIC VISIT: HCPCS | Mod: 25

## 2021-02-23 PROCEDURE — 250N000013 HC RX MED GY IP 250 OP 250 PS 637: Performed by: HOSPITALIST

## 2021-02-23 PROCEDURE — 85025 COMPLETE CBC W/AUTO DIFF WBC: CPT | Performed by: HOSPITALIST

## 2021-02-23 PROCEDURE — 250N000009 HC RX 250: Performed by: HOSPITALIST

## 2021-02-23 PROCEDURE — 999N000157 HC STATISTIC RCP TIME EA 10 MIN

## 2021-02-23 PROCEDURE — 96361 HYDRATE IV INFUSION ADD-ON: CPT

## 2021-02-23 PROCEDURE — 94640 AIRWAY INHALATION TREATMENT: CPT

## 2021-02-23 PROCEDURE — 94640 AIRWAY INHALATION TREATMENT: CPT | Mod: 76

## 2021-02-23 PROCEDURE — 36415 COLL VENOUS BLD VENIPUNCTURE: CPT | Performed by: HOSPITALIST

## 2021-02-23 PROCEDURE — 999N001017 HC STATISTIC GLUCOSE BY METER IP

## 2021-02-23 RX ADMIN — Medication 50 MCG: at 08:55

## 2021-02-23 RX ADMIN — SODIUM BICARBONATE 650 MG TABLET 650 MG: at 08:55

## 2021-02-23 RX ADMIN — POLYMYXIN B SULFATE AND TRIMETHOPRIM 1 DROP: 1; 10000 SOLUTION OPHTHALMIC at 13:31

## 2021-02-23 RX ADMIN — ALBUTEROL SULFATE 2.5 MG: 2.5 SOLUTION RESPIRATORY (INHALATION) at 12:06

## 2021-02-23 RX ADMIN — POTASSIUM & SODIUM PHOSPHATES POWDER PACK 280-160-250 MG 1 PACKET: 280-160-250 PACK at 08:55

## 2021-02-23 RX ADMIN — POLYMYXIN B SULFATE AND TRIMETHOPRIM 1 DROP: 1; 10000 SOLUTION OPHTHALMIC at 08:59

## 2021-02-23 RX ADMIN — CARBAMAZEPINE 150 MG: 100 SUSPENSION ORAL at 11:22

## 2021-02-23 RX ADMIN — POTASSIUM & SODIUM PHOSPHATES POWDER PACK 280-160-250 MG 1 PACKET: 280-160-250 PACK at 13:30

## 2021-02-23 RX ADMIN — MINERAL SUPPLEMENT IRON 300 MG / 5 ML STRENGTH LIQUID 100 PER BOX UNFLAVORED 300 MG: at 08:54

## 2021-02-23 RX ADMIN — CALCIUM CARBONATE 1250 MG: 1250 SUSPENSION ORAL at 14:58

## 2021-02-23 RX ADMIN — ASPIRIN 81 MG CHEWABLE TABLET 81 MG: 81 TABLET CHEWABLE at 08:55

## 2021-02-23 RX ADMIN — BRIVARACETAM 100 MG: 10 SOLUTION ORAL at 08:56

## 2021-02-23 RX ADMIN — PANTOPRAZOLE SODIUM 40 MG: 40 TABLET, DELAYED RELEASE ORAL at 08:57

## 2021-02-23 RX ADMIN — METOCLOPRAMIDE HYDROCHLORIDE 10 MG: 5 SOLUTION ORAL at 11:22

## 2021-02-23 RX ADMIN — HYDROCORTISONE 15 MG: 10 TABLET ORAL at 08:55

## 2021-02-23 RX ADMIN — CALCIUM CARBONATE 1250 MG: 1250 SUSPENSION ORAL at 08:57

## 2021-02-23 RX ADMIN — CARBAMAZEPINE 150 MG: 100 SUSPENSION ORAL at 06:12

## 2021-02-23 RX ADMIN — ACETYLCYSTEINE 2 ML: 200 SOLUTION ORAL; RESPIRATORY (INHALATION) at 12:06

## 2021-02-23 RX ADMIN — OXYCODONE HYDROCHLORIDE AND ACETAMINOPHEN 1000 MG: 500 TABLET ORAL at 08:56

## 2021-02-23 RX ADMIN — CARBAMAZEPINE 150 MG: 100 SUSPENSION ORAL at 00:10

## 2021-02-23 RX ADMIN — LEVOTHYROXINE SODIUM 150 MCG: 75 TABLET ORAL at 06:12

## 2021-02-23 NOTE — CONSULTS
"CLINICAL NUTRITION SERVICES  -  ASSESSMENT NOTE      Future/Additional Recommendations:     Nutrition Support Enteral: orders entered by MD  Type of Feeding Tube: GJ tube (feed into J-port)  Enteral Frequency:  Continuous  Enteral Regimen: Isosource 1.5 @ 55 mL/hr (substitute for Jevity 1.5)  Total Enteral Provisions: 1980 cals (26 cals/kg), 90 gm pro (1.2 gm/kg), 20 gm fiber, 1003 mL free water  Free Water Flush: 60 mL every 4 hrs     Malnutrition:   % Weight Loss:  None noted  % Intake:  No decreased intake noted  Subcutaneous Fat Loss:  Deferred - do not anticipate acute fat loss  Muscle Loss:  Deferred - do not anticipate acute muscle loss  Fluid Retention:  None noted    Malnutrition Diagnosis: Patient does not meet two of the above criteria necessary for diagnosing malnutrition        REASON FOR ASSESSMENT  Keyon Farias is a 60 year old male assessed by Registered Dietitian for Provider Order - Registered Dietitian to Assess and Order TF per Medical Nutrition Therapy Protocol      NUTRITION HISTORY  Chart reviewed  Pt known form previous admits    2/16/21: GJ tube exchanged (feeding into J-port)    Spoke with patient's mother ([place]) to verify home enteral regimen - \"I think he is coming home today\"  - Receives Jevity 1.5 @ 55 ml/hr x 24 hours (5.5 cans/day) + 1 Scoop Benefiber daily (\"I give a heaping teaspoon\"). Provides 1967 kcal, 83 gm protein and 990 mL free water.   - He gets 1 L free water over the course of the day.   - She notes that pt has been tolerating his TF well, wt has been stable      CURRENT NUTRITION ORDERS  Diet Order:     NPO    MD ordered TF last evening:  Isosource 1.5 @ 55 mL/hr (substitution for Jevity 1.5)  Water flushes of 60 mL every 4 hrs        NUTRITION FOCUSED PHYSICAL ASSESSMENT FOR DIAGNOSING MALNUTRITION)    No:  deferred               Obtained from Chart/Interdisciplinary Team:  Long-term EN    ANTHROPOMETRICS  Height: 5' 11\"  Weight:(2/22) 74.8 kg / 165 lbs 0 oz  Body " mass index is 23.01 kg/m .  Weight Status:  Normal BMI  IBW: 78.2 kg  % IBW: 96%  Weight History: mother reports wt has been stable  Wt Readings from Last 10 Encounters:   02/22/21 74.8 kg (165 lb)   01/18/21 74.2 kg (163 lb 9.3 oz)   12/20/20 77.2 kg (170 lb 1.6 oz)   11/04/20 71 kg (156 lb 8.4 oz)   10/25/20 68.9 kg (151 lb 12.8 oz)   10/23/20 72.6 kg (160 lb)   10/14/20 73.5 kg (162 lb)   09/25/20 73.7 kg (162 lb 6.4 oz)   08/22/20 69.9 kg (154 lb 3.2 oz)   08/15/20 74.2 kg (163 lb 9.3 oz)         LABS  Labs reviewed    MEDICATIONS  IVF @ 75 mL/hr      ASSESSED NUTRITION NEEDS PER APPROVED PRACTICE GUIDELINES:    Dosing Weight 74.8 kg  Estimated Energy Needs: 8922-2996 kcals (25-30 Kcal/Kg)  Justification: maintenance  Estimated Protein Needs: 75-90 grams protein (1-1.2 g pro/Kg)  Justification: maintenance  Estimated Fluid Needs: 4835-7069  mL (1 mL/Kcal)  Justification: maintenance    MALNUTRITION:  % Weight Loss:  None noted  % Intake:  No decreased intake noted  Subcutaneous Fat Loss:  Deferred - do not anticipate acute fat loss  Muscle Loss:  Deferred - do not anticipate acute muscle loss  Fluid Retention:  None noted    Malnutrition Diagnosis: Patient does not meet two of the above criteria necessary for diagnosing malnutrition      NUTRITION DIAGNOSIS:  No nutrition diagnosis identified at this time         NUTRITION INTERVENTIONS  Recommendations / Nutrition Prescription  NPO      Nutrition Support Enteral: orders entered by MD  Type of Feeding Tube: GJ tube (feed into J-port)  Enteral Frequency:  Continuous  Enteral Regimen: Isosource 1.5 @ 55 mL/hr (substitute for Jevity 1.5)  Total Enteral Provisions: 1980 cals (26 cals/kg), 90 gm pro (1.2 gm/kg), 20 gm fiber, 1003 mL free water  Free Water Flush: 60 mL every 4 hrs  .      Implementation  Nutrition education ---> Reviewed TF plans with pt's mother  .      Nutrition Goals  EN to meet % est needs  .      MONITORING AND EVALUATION:  Progress towards  goals will be monitored and evaluated per protocol and Practice Guidelines

## 2021-02-23 NOTE — PROGRESS NOTES
Observation goals PRIOR TO DISCHARGE     Comments: -diagnostic tests and consults completed and resulted - not met  -vital signs normal or at patient baseline - met  Nurse to notify provider when observation goals have been met and patient is ready for discharge.

## 2021-02-23 NOTE — PROGRESS NOTES
"Care Management Discharge Note    Discharge Date: 02/23/21  Discharge Disposition: Home, Home Care, Home Infusion  Discharge Services: County Worker, PCA  Discharge DME: Wheelchair  Discharge Transportation: other (see comments)( [employer])  Private pay costs discussed: Not applicable  PAS Confirmation Code:  Not applicable  Patient/family educated on Medicare website which has current facility and service quality ratings: yes  Education Provided on the Discharge Plan:    Persons Notified of Discharge Plans: pt's mother, homecare agency, bedside RN, unit SW, hospitalist, transportation agency  Patient/Family in Agreement with the Plan: yes  Handoff Referral Completed: No    Additional Information:  Appreciate iSmona COTTON action to facilitate communication with pt's mother regarding discharge planning for today.   Received call from [facility] requesting orders faxed to the number below (orders faxed 02/23/21 at 2:44 PM).   BRODYRN also updated AVS with contact info for pt's homecare agencies and the information obtained by Simona COTTON,     Per [facility], your current RN services are billed via [facility]; thus duplicate Skilled RN services with [facility] would not be needed.  The referral for [facility] RN orders were discontinued.  You may continue with your home care services that are are arranged via:     [facility] (phone [phone], fax [phone])  12 hours RN coverage daily   [facility] (per note \"currently 80.5 hours every 7 days\")   12 hours PCA evening / night   [facility] (phone [phone] / fax [phone])  Also supply nursing   [facility]   For TF supplies  Legal Guardian  MotherSavannah    Pt has  [employer] [facility] transportation / stretcher ride scheduled for 02/23/21 at 1500 for discharge.     Brooke Ivory RN, BSN, PHN  [facility]  Inpatient Care Management - " [facility]  Mobile: [phone] 02/23/21 until 4pm  (after today's date, please call the patient's unit)

## 2021-02-23 NOTE — PROGRESS NOTES
Observation goals PRIOR TO DISCHARGE    Comments:   -diagnostic tests and consults completed and resulted: Not met    -vital signs normal or at patient baseline : Met    Nurse to notify provider when observation goals have been met and patient is ready for discharge.

## 2021-02-23 NOTE — DISCHARGE SUMMARY
[facility]    Discharge Summary  Hospitalist    Date of Admission:  2/22/2021  Date of Discharge:  2/23/2021  3:30 PM  Discharging Provider: Israel Wright MD  Date of Service (when I saw the patient): 02/23/21    Discharge Diagnoses   Low grade fevers   H/o recurrent aspiration with pneumonitis  Chronic pulmonary aspiration  TBI with spastic hemiplegia  Epilepsy  Panhypopituitarism  Dysphagia with PEG  Feeding tube dependent   History of coccygeal pressure ulcer  GERD  COVID-19 PCR NEGATIVE    History of Present Illness   Keyon Farias is a 60 year old male with TBI with spastic hemiplegia and dysphagia s/p PEG, nonverbal, seizure disorder, panhypopituitarism, GERD, hx recurrent aspiration pneumonia with sepsis (>5 hospitalizations for this since August 2020)  who presented with a fever for the past 2-3 days. Other than that, his mother has only noted nightmares and difficulty sleeping at night. He was brought into the hospital for observation.    Hospital Course   Keyon Farias was admitted on 2/22/2021.  The following problems were addressed during his hospitalization:    Low grade fevers   H/o recurrent aspiration with pneumonitis  Chronic pulmonary aspiration  CXR with atelectasis. No significant new changes. No white count and no elevation in WBC. UA did not show signs of infection. Blood cultures negative to date.  -Aspiration precautions.  -Continue PTA mucomyst, albuterol nebs.  -Admitting provider discussed with patient's mother, Savannah. She wanted to avoid unnecessary antibiotics if possible.  -He was afebrile in the ER. Did not require supplemental oxygen. Seems to be at baseline.  -Updated his mother, Savannah, on the day of discharge. She wants to bring him home. Discussed that work-up so far in the hospital has been negative and we have not identified a source of his fever. She will continue to monitor him at home and will seek medical attention if he develops recurrent  fevers or other signs/symptoms of infection.  -He will benefit from home health RN/PT/OT after discharge.  -Follow-up with PCP.     TBI with spastic hemiplegia  Epilepsy  Panhypopituitarism  [hydrocortisone, levothyroxine]  -Continue PTA medications.     Dysphagia with PEG  Feeding tube dependent   -Nutrition consulted.  -WOC consulted.     History of coccygeal pressure ulcer  -WOC consultation.     GERD  -Continue PTA pantoprazole.     COVID-19 PCR NEGATIVE  -COVID-19 PCR testing was NEGATIVE on 2/22/21.    Israel Wright MD    Significant Results and Procedures   None    Pending Results   These results will be followed up by Dr. Wright  Unresulted Labs Ordered in the Past 30 Days of this Admission     Date and Time Order Name Status Description    2/22/2021 1402 Blood culture Preliminary     2/22/2021 1402 Blood culture Preliminary     2/22/2021 1402 Urine Culture Preliminary     2/19/2021 1113 Blood culture Preliminary     2/19/2021 1113 Blood culture Preliminary         Code Status   Full Code       Primary Care Physician   Carlos Gomez    Physical Exam   Temp: 96.3  F (35.7  C) Temp src: Axillary BP: 104/55 Pulse: 86   Resp: 16 SpO2: 96 % O2 Device: None (Room air)    Vitals:    02/22/21 1359   Weight: 74.8 kg (165 lb)     Vital Signs with Ranges  Temp:  [95.2  F (35.1  C)-100.5  F (38.1  C)] 96.3  F (35.7  C)  Pulse:  [] 86  Resp:  [16-20] 16  BP: ()/(55-87) 104/55  SpO2:  [92 %-100 %] 96 %  I/O last 3 completed shifts:  In: 180 [NG/GT:180]  Out: -     Constitutional: awake, alert, no apparent distress  Respiratory: clear to auscultation bilaterally, no crackles or wheezing  Cardiovascular: regular rate and rhythm, normal S1 and S2, no murmur noted  GI: normal bowel sounds, soft, non-distended, non-tender, PEG tube in place  Skin: warm, dry  Musculoskeletal: no lower extremity pitting edema present  Neurologic: awake, alert, non-verbal, RUE and BLE paresis    Discharge Disposition   Discharged  to home  Condition at discharge: Stable    Consultations This Hospital Stay   WOUND OSTOMY CONTINENCE NURSE  IP CONSULT  NUTRITION SERVICES ADULT IP CONSULT    Time Spent on this Encounter   I, Israel Wright MD, personally saw the patient today and spent greater than 30 minutes discharging this patient.    Discharge Orders      Home Care PT Referral for Hospital Discharge      Home Care OT Referral for Hospital Discharge      Home care nursing referral      Reason for your hospital stay    You were in the hospital due to a fever.     Follow-up and recommended labs and tests     Follow up with primary care provider, Carlos Gomez, within 7 days for hospital follow- up.  No follow up labs or test are needed.     Activity    Your activity upon discharge: up with assistance     MD face to face encounter    Documentation of Face to Face and Certification for Home Health Services    I certify that patient: Keyon Farias is under my care and that I, or a nurse practitioner or physician's assistant working with me, had a face-to-face encounter that meets the physician face-to-face encounter requirements with this patient on: 2/23/2021.    This encounter with the patient was in whole, or in part, for the following medical condition, which is the primary reason for home health care: fever.    I certify that, based on my findings, the following services are medically necessary home health services: Nursing, Occupational Therapy and Physical Therapy.    My clinical findings support the need for the above services because: Nurse is needed: To provide assessment and oversight required in the home to assure adherence to the medical plan due to: recurrent admissions, fevers, potential for pressure ulcers.., Occupational Therapy Services are needed to assess and treat cognitive ability and address ADL safety due to impairment in cognition. and Physical Therapy Services are needed to assess and treat the following functional  impairments: generalized deconditioning.    Further, I certify that my clinical findings support that this patient is homebound (i.e. absences from home require considerable and taxing effort and are for medical reasons or Rast[employer] services or infrequently or of short duration when for other reasons) because: Requires assistance of another person or specialized equipment to access medical services because patient: requires assistance with any transfers due to generalized deconditioning and impaired cognition..    Based on the above findings. I certify that this patient is confined to the home and needs intermittent skilled nursing care, physical therapy and/or speech therapy.  The patient is under my care, and I have initiated the establishment of the plan of care.  This patient will be followed by a physician who will periodically review the plan of care.  Physician/Provider to provide follow up care: Carlos Gomez    Attending hospital physician (the Medicare certified [place] provider): Israel Wright MD  Physician Signature: See electronic signature associated with these discharge orders.  Date: 2/23/2021     Full Code     Diet    Follow this diet upon discharge:       Adult Formula Drip Feeding: Resume tube feedings as previously ordered.      NPO for Medical/Clinical Reasons Except for: No Exceptions     Discharge Medications   Current Discharge Medication List      CONTINUE these medications which have NOT CHANGED    Details   acetaminophen (TYLENOL 8 HOUR) 650 MG CR tablet Take 650 mg by mouth every 8 hours as needed for mild pain or fever      acetylcysteine (MUCOMYST) 20 % neb solution Take 2 mLs by nebulization 4 times daily With albuterol at 0700, 1100, 1500, and 1900       albuterol (PROVENTIL) (5 MG/ML) 0.5% neb solution Take 2.5 mg by nebulization every 4 hours (while awake) 0700 1100 1500 1900 with mucomyst       aspirin (ASA) 81 MG chewable tablet 81 mg by Oral or Feeding Tube route daily At  0900      bacitracin ointment Apply topically daily as needed for wound care To PEG site.       Brivaracetam (BRIVIACT) 10 MG/ML solution 100 mg by Oral or Feeding Tube route 2 times daily 0900, 2100      calcium carbonate 1250 MG/5ML SUSP suspension Take 1,250 mg by mouth 3 times daily 0900, 1500, 2100      !! carBAMazepine (TEGRETOL) 100 MG/5ML suspension Take 100 mg by mouth daily Take at 1800       !! carBAMazepine (TEGRETOL) 100 MG/5ML suspension 150 mg by Oral or Feeding Tube route 3 times daily At 06:00, 12:00, and 24:00 for seizures      clotrimazole-betamethasone (LOTRISONE) 1-0.05 % external cream Apply topically 2 times daily as needed   Qty:        ferrous sulfate 220 (44 Fe) MG/5ML ELIX 220 mg by Per Feeding Tube route daily @ 0900      Guar Gum (FIBER MODULAR, NUTRISOURCE FIBER,) packet 1 packet by Per Feeding Tube route daily      !! hydrocortisone (CORTEF) 10 MG tablet 5 mg by Oral or Feeding Tube route 2 times daily Take one 5 mg pill by NG at 1500 and 3 x 5 mg pill by ND at 2100.      !! hydrocortisone (CORTEF) 5 MG tablet 3 tablets (15 mg) by Oral or Feeding Tube route every morning During illness please increase the dose as directed.  Qty: 270 tablet, Refills: 3    Associated Diagnoses: Secondary adrenal insufficiency (H)      hydrocortisone 1 % CREA cream Place rectally 2 times daily as needed for other Apply to reddened memo areas as needed      hydrocortisone sodium succinate PF (SOLU-CORTEF) 100 MG injection Inject 1 mL (50 mg) into the muscle once as needed (If unable to keep oral hydrocortisone due to vomiting.) Dispense Act-O-Vial  Qty: 10 mL, Refills: 1    Comments: Dispense Act-O-Vial  Associated Diagnoses: Secondary adrenal insufficiency (H)      levothyroxine (SYNTHROID/LEVOTHROID) 150 MCG tablet Take 150 mcg by mouth every morning At 0500      melatonin (MELATONIN) 1 MG/ML LIQD liquid 6 mg by Per NG tube route At Bedtime       metoclopramide (REGLAN) 10 MG/10ML SOLN solution Take 10  mg by mouth 4 times daily (before meals and nightly) 0800, 1200, 1600, 2000  Disconnects bag before administration, then waits 45 mins before reconnecting after giving the medication      miconazole (MICATIN) 2 % AERP powder Apply topically 2 times daily as needed       mupirocin (BACTROBAN) 2 % external ointment Apply topically 2 times daily as needed       pantoprazole (PROTONIX) 2 mg/mL SUSP suspension 20 mLs (40 mg) by Per J Tube route daily  Qty: 400 mL, Refills: 0    Comments: Future refills by PCP Dr. Carlos Gomez with phone number [phone].  Associated Diagnoses: Gastroesophageal reflux disease, esophagitis presence not specified      potassium & sodium phosphates (NEUTRA-PHOS) 280-160-250 MG Packet Take 1 packet by mouth 3 times daily       prochlorperazine (COMPAZINE) 25 MG suppository Place 1 suppository (25 mg) rectally every 12 hours as needed for nausea or vomiting  Qty: 15 suppository, Refills: 0    Comments: Future refills by PCP Dr. Carlos Gomez with phone number [phone].  Associated Diagnoses: Aspiration pneumonia of right middle lobe, unspecified aspiration pneumonia type (H)      Scopolamine HBr POWD Dispense #90. Mix contents with small amount of water for admin via J-tube.  Administer 0.8 mg three times each day.  Qty: 90 Bottle, Refills: 3    Associated Diagnoses: Aspiration pneumonia (H)      Skin Protectants, Misc. (BALMEX SKIN PROTECTANT) OINT Externally apply topically 2 times daily as needed (irritation) Applay to reddened memo areas twice daily as needed      sodium bicarbonate 650 MG tablet Take 1 tablet (650 mg) by mouth daily  Qty: 30 tablet, Refills: 0    Associated Diagnoses: Hyponatremia      trimethoprim-polymyxin b (POLYTRIM) 00344-1.1 UNIT/ML-% ophthalmic solution Place 1 drop into the right eye every 6 hours For 5 days      vitamin C (ASCORBIC ACID) 1000 MG TABS 1,000 mg by Oral or Feeding Tube route daily       vitamin D3 (CHOLECALCIFEROL) 2000 units (50 mcg) tablet  Take 2,000 Units by mouth daily Crush and feed via j-tube @@ 0900      testosterone cypionate (DEPOTESTOSTERONE) 200 MG/ML injection Inject 0.38 mLs (76 mg) into the muscle every 14 days  Qty: 3 mL, Refills: 1    Associated Diagnoses: Panhypopituitarism (H); Hypogonadism male       !! - Potential duplicate medications found. Please discuss with provider.        Allergies   Allergies   Allergen Reactions     Valproic Acid Other (See Comments)     Toxicity w/ bone marrow suspension, elevated ammonia levels      Dilantin [Phenytoin Sodium] Other (See Comments)     Severe Trembling     Scopolamine Hives     Hives with the patch - oral no problem     Data   Most Recent 3 CBC's:  Recent Labs   Lab Test 02/23/21  0618 02/22/21  1413 02/19/21  1123   WBC 6.6 9.1 8.5   HGB 12.1* 12.1* 12.6*   MCV 88 89 87    199 154      Most Recent 3 BMP's:  Recent Labs   Lab Test 02/22/21  1413 02/19/21  1123 01/18/21  0854    135 139   POTASSIUM 3.9 3.8 3.9   CHLORIDE 103 95 109   CO2 29 34* 27   BUN 19 25 12   CR 0.82 0.96 0.76   ANIONGAP 6 6 3   STEVE 8.6 8.8 8.5   * 161* 144*     Most Recent 2 LFT's:  Recent Labs   Lab Test 02/22/21  1413 02/19/21  1123   AST 20 33   ALT 25 26   ALKPHOS 85 86   BILITOTAL 0.4 0.6     Most Recent 6 Bacteria Isolates From Any Culture (See EPIC Reports for Culture Details):  Recent Labs   Lab Test 02/22/21  1622 02/22/21  1439 02/22/21  1413 02/19/21  1155 02/19/21  1123 01/15/21  0214   CULT PENDING No growth after 14 hours No growth after 14 hours No growth after 4 days No growth after 4 days >100,000 colonies/mL  Klebsiella pneumoniae  *     Results for orders placed or performed during the hospital encounter of 02/22/21   XR Chest Port 1 View    Narrative    CHEST ONE VIEW  2/22/2021 2:45 PM     HISTORY: fever    COMPARISON: 2/19/2021      Impression    IMPRESSION: Lung volumes are persistently low and are lower than on  the prior study. No pleural fluid or pneumothorax. Bibasilar  opacities  represent atelectasis, though underlying airspace disease cannot be  excluded. The cardiomediastinal silhouette is not well assessed.    LESTER J FAHRNER, MD

## 2021-02-23 NOTE — PROGRESS NOTES
SW: Per request, [facility] scheduled stretcher transport back to patient's home at 1500 today. Confirmed no steps into home and relayed info to [facility].    PCS form completed, fax along with face sheet to [facility], copy placed on chart.    BAYRON Epperson  Daytime (8:00am-4:30pm): [phone]  After-Hours  Pager (4:30pm-11:30pm): [phone]

## 2021-02-23 NOTE — CONSULTS
Care Management Initial Consult    General Information  Assessment completed with: Parents, (Savannah )  Type of CM/SW Visit: Initial Assessment    Primary Care Provider verified and updated as needed: Yes   Readmission within the last 30 days: unable to assess   Return Category: New Diagnosis     Advance Care Planning:            Communication Assessment  Patient's communication style: spoken language (English or Bilingual)(nonverbal)    Hearing Difficulty or Deaf: no        Cognitive  Cognitive/Neuro/Behavioral:    Level of Consciousness: confused  Arousal Level: arouses to voice     Mood/Behavior: calm  Best Language: 2 - Severe aphasia  Speech: unable to speak    Living Environment:   People in home: parent(s)  (Savannah)  Current living Arrangements: house      Able to return to prior arrangements: yes       Family/Social Support:  Care provided by: parent(s), homecare agency, other (see comments)(PCA)  Provides care for: no one  Marital Status: Single  Parent(s)          Description of Support System: Supportive    Support Assessment: Adequate family and caregiver support    Current Resources:   Patient receiving home care services:       Community Resources:    Equipment currently used at home:    Supplies currently used at home:      Employment/Financial:  Employment Status: disabled        Financial Concerns:             Lifestyle & Psychosocial Needs:        Socioeconomic History     Marital status: Single     Spouse name: Not on file     Number of children: Not on file     Years of education: Not on file     Highest education level: Not on file     Tobacco Use     Smoking status: Former Smoker     Quit date: 1989     Years since quittin.8     Smokeless tobacco: Never Used   Substance and Sexual Activity     Alcohol use: No     Drug use: No     Sexual activity: Never       Functional Status:  Prior to admission patient needed assistance:              Mental Health Status:          Chemical  "Dependency Status:                Values/Beliefs:  Spiritual, Cultural Beliefs, Evangelical Practices, Values that affect care: yes(Buddhism)               Additional Information:  Per chart review, patient is ready to discharge home with home care today.  Called pt's Legal Guardian, his Mother Savannah to discuss discharge plan.  Per Savannah, pt has the following services in place:  [facility] (phone [phone], fax [phone])  12 hours RN coverage daily   [facility] (per note \"currently 80.5 hours every 7 days\")   12 hours PCA evening / night   [facility] (phone [phone])  Also supply [facility]   For TF supplies  Legal Guardian  Mother, Savannah  Discussed home care RN PT/OT.  Per Savannah, in agreement with RN, PT/OT home care.  Offered home care choice.  Savannah requested to use [facility] for Skilled RN, PT/OT services.  Referral sent to [facility] and phone number added to AVS.  Per Savannah, she will call to resume pt's 12 hours of RN coverage through [facility] and pt's PCA services through [facility].  Informed Savannah that  [facility] Stretcher transport ride for 3:00PM today.  Savannah requested that pt receive eye drops be returned to pt prior to discharge.  Called Obs unit and spoke to [facility] to relay this information to bedside RN.   Per Savannah, she would like to make PCP follow-up appointment.  Received notice from [facility] RN Liaison that with pt's current RN services through [facility], Skilled RN services with [facility] would not be needed.  Home Care RN order discontinued.  Simona Pierson RN  Simona Pierson RN, BSN, OCN   Inpatient Care Coordination [address]  Office: [phone]      "

## 2021-02-23 NOTE — PROVIDER NOTIFICATION
MD Notification    Notified Person: MD    Notified Person Name: MD Neno    Notification Date/Time: 2/22/2021, 2055      Notification Interaction: Paged    Purpose of Notification: Need diet order.    Orders Received: Pending    Comments:

## 2021-02-23 NOTE — PROGRESS NOTES
Pt.nonverbal, alert.  VSS r/a, afebrile.  Strict NPO besides tube feeding.  MRSA contact precautions.  Tube feeding at goal rate of 55 mL/h.  Mepilex on sacrum.  , 112.  WOC and nutrition consult today.  Turn and repo Q2.  Pt. Hard stick.

## 2021-02-23 NOTE — PROGRESS NOTES
[facility]  Spoke with patient's mother Savannah to discuss plans for HC. Patient to be discharged home today and mother has agreed to have [facility] follow with PT and OT. Savannah declined need for skilled RN as pt already has RN in place 12hr/day for 5day/week. Patient care support center processing referral. Patient's mother verbalized understanding that initial visit is scheduled for 2/24 or 2/25. Patient has 24 hour phone number for [facility] for any questions or concerns.    Serena Sparks RN   [facility] Care Liaison   [phone]

## 2021-02-23 NOTE — PLAN OF CARE
Pt is discharging home at this time w/ all his belongings via [facility] using a stretcher. AVS sent with the pt. Guardian notified about the discharge by both the MD and the writer today and she in agreement with the discharge. PEG tube patent.

## 2021-02-23 NOTE — CONSULTS
[facility]  WO Nurse Inpatient Assessment   Reason for consultation: Evaluate and treat midline coccyx, memo PEG tube tissue    Assessment     Midline coccyx with thin, waxy slough-scab, no drainage, no fluctuance, nearly healed, community acquired full thickness PI,  wound likely due to pressure vs moisture issues, very thin, small wound, no s/s of infection.  Will plan on keeping positioned side to side only, no dressings necessary.      Memo PET tube tissue intact without any erythema.  The very lip of the os opening has a few spots of hyperplasia that are very tiny, at this time I don't think it needs to be knocked back/ treated with silver nitrate, just standard cares will suffice.    Treatment Plan    Midline coccyx plan of care: effective now   PRESSURE INJURY PREVENTION (PIP) MEASURES  1. REPOSITION   BED POSITIONING - SIDE TO SIDE ONLY WHEN IN BED, EVERY 2 HOURS- this will help not only the skin but the respiratory status   - MAKE SURE TO USE AT LEAST 5-6 PILLOWS IN THE BED AT ALL TIMES TO ACHIEVE GOOD POSITIONING  - If pt is NOT turning then you need to notify/ discuss with the charge nurse, nurse manager and provider AND document this conversation    - if not turning due to pain then discuss with provider in addition to repositioning, providing hot/ cold packs, evaluate for low air loss mattress   2.   HYGIENE  - PT IS NOT CLEAN UNTIL YOU SEE THE BASE OF THE SKIN FOLD AND WIPES COME AWAY CLEAN- then dust with baby powder  - BRIEFS OFF OR LOOSE IN BED      Memo PEG tube tissue  Standard cares with placing just one fenestrated 4x4 gauze and taped on one side to secure, time and date    Recommended provider order: n/a   Orders written  [facility] Nurse follow-up plan: signing off    Patient History    According to provider note(s):  60 year old male admitted on 2/22/2021. He      Low grade fevers   H/o recurrent aspiration with pneumonitis  Chronic pulmonary aspiration  CXR with  atelectasis  No white count and no elevation in WBC    Objective Data    Containment of urine/stool: Incontinence Protocol, Brief and Incontinent pad in bed  Active Diet Order:  Orders Placed This Encounter      NPO for Medical/Clinical Reasons Except for: No Exceptions      Diet    Labs:   Recent Labs   Lab 02/23/21  0618 02/22/21  1413   ALBUMIN  --  3.1*   HGB 12.1* 12.1*   WBC 6.6 9.1     Output:   I/O last 3 completed shifts:  In: 180 [NG/GT:180]  Out: -   Risk Assessment:   Sensory Perception: 2-->very limited  Moisture: 3-->occasionally moist  Activity: 1-->bedfast  Mobility: 2-->very limited  Nutrition: 3-->adequate  Friction and Shear: 2-->potential problem  Ricci Score: 13    Physical Exam    Midline coccyx assessment   Wound / skin history: community acquired PI  Photo date: February 23, 2021      Midline coccyx wound measures 1.2cm x 0.3cm, thin waxy slough, no drainage or erythema noted, no indications of pain      Jessica PEG tube tissue  February 23, 2021      Tissue around the PEG tube is intact with trace, blanchable erythema noted.  Immediate lip of the os has small, scattered areas of hyperplasia bubbles note, especially from 6-9oclock and 11oclock.    Interventions  Current support surface: Standard  Atmos Air mattress,   Current off-loading measures: Pillows under calves and Pillows,   Visual inspection of wound(s) completed with the N/A   Wound Care: None necessary,   Supplies: reviewed  Education provided: Not appropriate today   Discussed plan of care with n/a    Brianna Michelle RN

## 2021-02-23 NOTE — PROGRESS NOTES
RECEIVING UNIT ED HANDOFF REVIEW    ED Nurse Handoff Report was reviewed by: Ryanne Pereira RN on February 22, 2021 at 7:43 PM

## 2021-02-23 NOTE — DISCHARGE INSTRUCTIONS
"[facility], your current RN services are billed via [facility]; thus duplicate Skilled RN services with [facility] would not be needed.  The referral for [facility] RN orders were discontinued.  You may continue with your home care services that are are arranged via:     [facility] (phone [phone], fax [phone])  12 hours RN coverage daily   [facility] (per note \"currently 80.5 hours every 7 days\")   12 hours PCA evening / night   [facility] (phone [phone] / fax [phone])  Also supply nursing   [facility]   For TF supplies  Legal Guardian  Mother, Savannah  "

## 2021-02-23 NOTE — PROGRESS NOTES
Observation goals PRIOR TO DISCHARGE    Comments:   -diagnostic tests and consults completed and resulted: Met     -vital signs normal or at patient baseline : Met     Nurse to notify provider when observation goals have been met and patient is ready for discharge.

## 2021-03-17 DIAGNOSIS — E03.8 SECONDARY HYPOTHYROIDISM: Primary | ICD-10-CM

## 2021-03-20 NOTE — TELEPHONE ENCOUNTER
levothyroxine (SYNTHROID/LEVOTHROID) 150 MCG tablet  Last Written Prescription Date:  unknown  Last Fill Quantity: unknown,   # refills: unknown  Last Office Visit : 1/20/21  Future Office visit:  4/28/21    Routing refill request to provider for review/approval because: historical.

## 2021-03-22 RX ORDER — LEVOTHYROXINE SODIUM 150 UG/1
150 TABLET ORAL EVERY MORNING
Qty: 90 TABLET | Refills: 1 | Status: ON HOLD | OUTPATIENT
Start: 2021-03-22 | End: 2021-07-26

## 2021-03-23 ENCOUNTER — TELEPHONE (OUTPATIENT)
Dept: ENDOCRINOLOGY | Facility: CLINIC | Age: 61
End: 2021-03-23

## 2021-03-23 NOTE — TELEPHONE ENCOUNTER
Call to [facility] pharmacy, reports has received prescription 3/22 and is ready for pickup.  Unsure why they did not receive notification of refill being ready.  Call to Keyon's mom, advised prescription was sent yesterday and is ready for pickup, verbalizes understanding

## 2021-03-23 NOTE — TELEPHONE ENCOUNTER
[facility] Call Center    Phone Message    May a detailed message be left on voicemail: yes     Reason for Call: Medication Refill Request    Has the patient contacted the pharmacy for the refill? Yes   Name of medication being requested: Levothyroxine  Provider who prescribed the medication: Dr. Mclean  Pharmacy: [facility] on [address] in [place]  Date medication is needed: Today     Pt's mom called stating pt only has enough medication for tomorrow.  She also stated the pharmacy has been requesting this for the pt with no response.    Mother is asking for a call when this has been done please      Action Taken: Message routed to:  [facility] ([facility] Endo    Travel Screening: Not Applicable

## 2021-04-15 ENCOUNTER — APPOINTMENT (OUTPATIENT)
Dept: GENERAL RADIOLOGY | Facility: CLINIC | Age: 61
End: 2021-04-15
Attending: EMERGENCY MEDICINE
Payer: MEDICARE

## 2021-04-15 ENCOUNTER — HOSPITAL ENCOUNTER (OUTPATIENT)
Facility: CLINIC | Age: 61
Setting detail: OBSERVATION
Discharge: HOME | End: 2021-04-16
Attending: EMERGENCY MEDICINE | Admitting: INTERNAL MEDICINE
Payer: MEDICARE

## 2021-04-15 DIAGNOSIS — J96.01 ACUTE RESPIRATORY FAILURE WITH HYPOXIA (H): ICD-10-CM

## 2021-04-15 LAB
ALBUMIN SERPL-MCNC: 3.2 G/DL (ref 3.4–5)
ALP SERPL-CCNC: 95 U/L (ref 40–150)
ALT SERPL W P-5'-P-CCNC: 22 U/L (ref 0–70)
ANION GAP SERPL CALCULATED.3IONS-SCNC: 5 MMOL/L (ref 3–14)
AST SERPL W P-5'-P-CCNC: 17 U/L (ref 0–45)
BASOPHILS # BLD AUTO: 0.1 10E9/L (ref 0–0.2)
BASOPHILS NFR BLD AUTO: 1.2 %
BILIRUB SERPL-MCNC: 0.4 MG/DL (ref 0.2–1.3)
BUN SERPL-MCNC: 17 MG/DL (ref 7–30)
CALCIUM SERPL-MCNC: 8.4 MG/DL (ref 8.5–10.1)
CHLORIDE SERPL-SCNC: 96 MMOL/L (ref 94–109)
CO2 SERPL-SCNC: 30 MMOL/L (ref 20–32)
CREAT SERPL-MCNC: 0.82 MG/DL (ref 0.66–1.25)
DIFFERENTIAL METHOD BLD: ABNORMAL
EOSINOPHIL # BLD AUTO: 0.6 10E9/L (ref 0–0.7)
EOSINOPHIL NFR BLD AUTO: 8.7 %
ERYTHROCYTE [DISTWIDTH] IN BLOOD BY AUTOMATED COUNT: 12 % (ref 10–15)
FLUAV RNA RESP QL NAA+PROBE: NEGATIVE
FLUBV RNA RESP QL NAA+PROBE: NEGATIVE
GFR SERPL CREATININE-BSD FRML MDRD: >90 ML/MIN/{1.73_M2}
GLUCOSE SERPL-MCNC: 118 MG/DL (ref 70–99)
HCT VFR BLD AUTO: 35 % (ref 40–53)
HGB BLD-MCNC: 12 G/DL (ref 13.3–17.7)
IMM GRANULOCYTES # BLD: 0 10E9/L (ref 0–0.4)
IMM GRANULOCYTES NFR BLD: 0.1 %
LABORATORY COMMENT REPORT: NORMAL
LACTATE BLD-SCNC: 1.3 MMOL/L (ref 0.7–2)
LYMPHOCYTES # BLD AUTO: 2.2 10E9/L (ref 0.8–5.3)
LYMPHOCYTES NFR BLD AUTO: 32.6 %
MCH RBC QN AUTO: 30 PG (ref 26.5–33)
MCHC RBC AUTO-ENTMCNC: 34.3 G/DL (ref 31.5–36.5)
MCV RBC AUTO: 88 FL (ref 78–100)
MONOCYTES # BLD AUTO: 1 10E9/L (ref 0–1.3)
MONOCYTES NFR BLD AUTO: 14 %
NEUTROPHILS # BLD AUTO: 3 10E9/L (ref 1.6–8.3)
NEUTROPHILS NFR BLD AUTO: 43.4 %
NRBC # BLD AUTO: 0 10*3/UL
NRBC BLD AUTO-RTO: 0 /100
NT-PROBNP SERPL-MCNC: 101 PG/ML (ref 0–900)
PLATELET # BLD AUTO: 154 10E9/L (ref 150–450)
POTASSIUM SERPL-SCNC: 3.8 MMOL/L (ref 3.4–5.3)
PROT SERPL-MCNC: 7.4 G/DL (ref 6.8–8.8)
RBC # BLD AUTO: 4 10E12/L (ref 4.4–5.9)
RSV RNA SPEC QL NAA+PROBE: NORMAL
SARS-COV-2 RNA RESP QL NAA+PROBE: NEGATIVE
SODIUM SERPL-SCNC: 131 MMOL/L (ref 133–144)
SPECIMEN SOURCE: NORMAL
TROPONIN I SERPL-MCNC: <0.015 UG/L (ref 0–0.04)
WBC # BLD AUTO: 6.9 10E9/L (ref 4–11)

## 2021-04-15 PROCEDURE — 84484 ASSAY OF TROPONIN QUANT: CPT | Performed by: EMERGENCY MEDICINE

## 2021-04-15 PROCEDURE — 83605 ASSAY OF LACTIC ACID: CPT | Performed by: EMERGENCY MEDICINE

## 2021-04-15 PROCEDURE — C9803 HOPD COVID-19 SPEC COLLECT: HCPCS

## 2021-04-15 PROCEDURE — 93005 ELECTROCARDIOGRAM TRACING: CPT

## 2021-04-15 PROCEDURE — 87636 SARSCOV2 & INF A&B AMP PRB: CPT | Performed by: EMERGENCY MEDICINE

## 2021-04-15 PROCEDURE — 96361 HYDRATE IV INFUSION ADD-ON: CPT

## 2021-04-15 PROCEDURE — 99285 EMERGENCY DEPT VISIT HI MDM: CPT | Mod: 25

## 2021-04-15 PROCEDURE — 87040 BLOOD CULTURE FOR BACTERIA: CPT | Performed by: EMERGENCY MEDICINE

## 2021-04-15 PROCEDURE — 83880 ASSAY OF NATRIURETIC PEPTIDE: CPT | Performed by: EMERGENCY MEDICINE

## 2021-04-15 PROCEDURE — 80053 COMPREHEN METABOLIC PANEL: CPT | Performed by: EMERGENCY MEDICINE

## 2021-04-15 PROCEDURE — 85025 COMPLETE CBC W/AUTO DIFF WBC: CPT | Performed by: EMERGENCY MEDICINE

## 2021-04-15 PROCEDURE — 258N000003 HC RX IP 258 OP 636: Performed by: EMERGENCY MEDICINE

## 2021-04-15 PROCEDURE — 96360 HYDRATION IV INFUSION INIT: CPT

## 2021-04-15 PROCEDURE — 71045 X-RAY EXAM CHEST 1 VIEW: CPT

## 2021-04-15 RX ADMIN — SODIUM CHLORIDE 1000 ML: 9 INJECTION, SOLUTION INTRAVENOUS at 22:52

## 2021-04-16 ENCOUNTER — APPOINTMENT (OUTPATIENT)
Dept: CT IMAGING | Facility: CLINIC | Age: 61
End: 2021-04-16
Attending: EMERGENCY MEDICINE
Payer: MEDICARE

## 2021-04-16 VITALS
HEART RATE: 74 BPM | DIASTOLIC BLOOD PRESSURE: 56 MMHG | WEIGHT: 167.55 LBS | TEMPERATURE: 98 F | SYSTOLIC BLOOD PRESSURE: 113 MMHG | RESPIRATION RATE: 16 BRPM | OXYGEN SATURATION: 95 % | BODY MASS INDEX: 23.37 KG/M2

## 2021-04-16 LAB — INTERPRETATION ECG - MUSE: NORMAL

## 2021-04-16 PROCEDURE — 93005 ELECTROCARDIOGRAM TRACING: CPT

## 2021-04-16 PROCEDURE — 99220 PR INITIAL OBSERVATION CARE,LEVEL III: CPT | Performed by: INTERNAL MEDICINE

## 2021-04-16 PROCEDURE — 99207 PR NO CHARGE LOS: CPT | Performed by: STUDENT IN AN ORGANIZED HEALTH CARE EDUCATION/TRAINING PROGRAM

## 2021-04-16 PROCEDURE — 258N000003 HC RX IP 258 OP 636: Performed by: INTERNAL MEDICINE

## 2021-04-16 PROCEDURE — G0378 HOSPITAL OBSERVATION PER HR: HCPCS

## 2021-04-16 PROCEDURE — 999N000197 HC STATISTIC WOC PT EDUCATION, 0-15 MIN

## 2021-04-16 PROCEDURE — 250N000009 HC RX 250: Performed by: INTERNAL MEDICINE

## 2021-04-16 PROCEDURE — 94640 AIRWAY INHALATION TREATMENT: CPT

## 2021-04-16 PROCEDURE — 250N000013 HC RX MED GY IP 250 OP 250 PS 637: Performed by: INTERNAL MEDICINE

## 2021-04-16 PROCEDURE — 999N000157 HC STATISTIC RCP TIME EA 10 MIN

## 2021-04-16 PROCEDURE — 94640 AIRWAY INHALATION TREATMENT: CPT | Mod: 76

## 2021-04-16 PROCEDURE — 250N000011 HC RX IP 250 OP 636: Performed by: EMERGENCY MEDICINE

## 2021-04-16 PROCEDURE — 70450 CT HEAD/BRAIN W/O DYE: CPT | Mod: ME

## 2021-04-16 PROCEDURE — 71275 CT ANGIOGRAPHY CHEST: CPT | Mod: ME

## 2021-04-16 RX ORDER — ALBUTEROL SULFATE 0.83 MG/ML
2.5 SOLUTION RESPIRATORY (INHALATION) 4 TIMES DAILY
Status: DISCONTINUED | OUTPATIENT
Start: 2021-04-16 | End: 2021-04-16 | Stop reason: HOSPADM

## 2021-04-16 RX ORDER — ACETAMINOPHEN 650 MG/1
650 SUPPOSITORY RECTAL EVERY 4 HOURS PRN
Status: DISCONTINUED | OUTPATIENT
Start: 2021-04-16 | End: 2021-04-16 | Stop reason: HOSPADM

## 2021-04-16 RX ORDER — IOPAMIDOL 755 MG/ML
64 INJECTION, SOLUTION INTRAVASCULAR ONCE
Status: COMPLETED | OUTPATIENT
Start: 2021-04-16 | End: 2021-04-16

## 2021-04-16 RX ORDER — ONDANSETRON 4 MG/1
4 TABLET, ORALLY DISINTEGRATING ORAL EVERY 6 HOURS PRN
Status: DISCONTINUED | OUTPATIENT
Start: 2021-04-16 | End: 2021-04-16 | Stop reason: HOSPADM

## 2021-04-16 RX ORDER — ACETYLCYSTEINE 200 MG/ML
4 SOLUTION ORAL; RESPIRATORY (INHALATION) 4 TIMES DAILY
Status: DISCONTINUED | OUTPATIENT
Start: 2021-04-16 | End: 2021-04-16 | Stop reason: HOSPADM

## 2021-04-16 RX ORDER — PIPERACILLIN SODIUM, TAZOBACTAM SODIUM 4; .5 G/20ML; G/20ML
4.5 INJECTION, POWDER, LYOPHILIZED, FOR SOLUTION INTRAVENOUS ONCE
Status: DISCONTINUED | OUTPATIENT
Start: 2021-04-16 | End: 2021-04-16

## 2021-04-16 RX ORDER — ACETAMINOPHEN 325 MG/1
650 TABLET ORAL EVERY 4 HOURS PRN
Status: DISCONTINUED | OUTPATIENT
Start: 2021-04-16 | End: 2021-04-16 | Stop reason: HOSPADM

## 2021-04-16 RX ORDER — LIDOCAINE 40 MG/G
CREAM TOPICAL
Status: DISCONTINUED | OUTPATIENT
Start: 2021-04-16 | End: 2021-04-16 | Stop reason: HOSPADM

## 2021-04-16 RX ORDER — ONDANSETRON 2 MG/ML
4 INJECTION INTRAMUSCULAR; INTRAVENOUS EVERY 6 HOURS PRN
Status: DISCONTINUED | OUTPATIENT
Start: 2021-04-16 | End: 2021-04-16 | Stop reason: HOSPADM

## 2021-04-16 RX ADMIN — DEXTROSE AND SODIUM CHLORIDE: 5; 900 INJECTION, SOLUTION INTRAVENOUS at 03:34

## 2021-04-16 RX ADMIN — HYDROCORTISONE 15 MG: 10 TABLET ORAL at 08:23

## 2021-04-16 RX ADMIN — ALBUTEROL SULFATE 2.5 MG: 2.5 SOLUTION RESPIRATORY (INHALATION) at 16:01

## 2021-04-16 RX ADMIN — ALBUTEROL SULFATE 2.5 MG: 2.5 SOLUTION RESPIRATORY (INHALATION) at 10:51

## 2021-04-16 RX ADMIN — IOPAMIDOL 64 ML: 755 INJECTION, SOLUTION INTRAVENOUS at 01:03

## 2021-04-16 RX ADMIN — ACETYLCYSTEINE 4 ML: 200 SOLUTION ORAL; RESPIRATORY (INHALATION) at 10:50

## 2021-04-16 RX ADMIN — ACETYLCYSTEINE 4 ML: 200 SOLUTION ORAL; RESPIRATORY (INHALATION) at 16:00

## 2021-04-16 ASSESSMENT — COLUMBIA-SUICIDE SEVERITY RATING SCALE - C-SSRS
4. HAVE YOU HAD THESE THOUGHTS AND HAD SOME INTENTION OF ACTING ON THEM?: NO
1. IN THE PAST MONTH, HAVE YOU WISHED YOU WERE DEAD OR WISHED YOU COULD GO TO SLEEP AND NOT WAKE UP?: NO
3. HAVE YOU BEEN THINKING ABOUT HOW YOU MIGHT KILL YOURSELF?: NO
2. HAVE YOU ACTUALLY HAD ANY THOUGHTS OF KILLING YOURSELF IN THE PAST MONTH?: NO

## 2021-04-16 ASSESSMENT — ACTIVITIES OF DAILY LIVING (ADL)
DEPENDENT_IADLS:: CLEANING;COOKING;LAUNDRY;SHOPPING;MEAL PREPARATION;MEDICATION MANAGEMENT;MONEY MANAGEMENT;TRANSPORTATION;INCONTINENCE
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
## 2021-04-16 NOTE — PROVIDER NOTIFICATION
Brief note, history and physical dictation pending:    Patient presented with increased weakness from baseline, found to have hypoxia to 87% on room air in the emergency department.  Suspicion for aspiration event, as patient with a history of acute on chronic aspiration events.    At this presentation, patient did not have an elevated lactic acid, no white count, no fevers.  Frequently, patient will present with dramatic presentation for aspiration pneumonitis with rapid resolution without anti-infective administration.    Fairly extensive work-up in the emergency department including CT of chest which appears consistent with atelectasis or scarring, less likely pneumonia.    Patient arrived on the floor, and is no longer hypoxic.  He is saturating 97 to 100% on room air.  He is asymptomatic    Admitted to observation status, anticipate discharge later this morning    Jerald Villavicencio MD  6:54 AM

## 2021-04-16 NOTE — PHARMACY-ADMISSION MEDICATION HISTORY
Pharmacy Medication History  Admission medication history interview status for the 4/15/2021  admission is complete. See EPIC admission navigator for prior to admission medications     Location of Interview: Phone  Medication history sources: Patient's family/friend (mother Savannah)    Significant changes made to the medication list:  none    In the past week, patient estimated taking medication this percent of the time: greater than 90%    Additional medication history information:   Would like Testosterone given before discharge today per mothers request    Medication reconciliation completed by provider prior to medication history? No    Time spent in this activity: 20 min    Prior to Admission medications    Medication Sig Last Dose Taking? Auth Provider   acetaminophen (TYLENOL 8 HOUR) 650 MG CR tablet Take 650 mg by mouth every 8 hours as needed for mild pain or fever  Yes Unknown, Entered By History   acetylcysteine (MUCOMYST) 20 % neb solution Take 2 mLs by nebulization 4 times daily With albuterol at 0700, 1100, 1500, and 1900   Yes Unknown, Entered By History   albuterol (PROVENTIL) (5 MG/ML) 0.5% neb solution Take 2.5 mg by nebulization every 4 hours (while awake) 0700 1100 1500 1900 with mucomyst   Yes Unknown, Entered By History   aspirin (ASA) 81 MG chewable tablet 81 mg by Oral or Feeding Tube route daily At 0900  Yes Unknown, Entered By History   bacitracin ointment Apply topically daily as needed for wound care To PEG site.   Yes Unknown, Entered By History   Brivaracetam (BRIVIACT) 10 MG/ML solution 100 mg by Oral or Feeding Tube route 2 times daily 0900, 2100  Yes Unknown, Entered By History   calcium carbonate 1250 MG/5ML SUSP suspension Take 1,250 mg by mouth 3 times daily 0900, 1500, 2100  Yes Unknown, Entered By History   carBAMazepine (TEGRETOL) 100 MG/5ML suspension Take 100 mg by mouth daily Take at 1800   Yes Unknown, Entered By History   carBAMazepine (TEGRETOL) 100 MG/5ML suspension 150  mg by Oral or Feeding Tube route 3 times daily At 06:00, 12:00, and 24:00 for seizures  Yes Unknown, Entered By History   clotrimazole-betamethasone (LOTRISONE) 1-0.05 % external cream Apply topically 2 times daily as needed   Yes Leticia Santiago MD   ferrous sulfate 220 (44 Fe) MG/5ML ELIX 220 mg by Per Feeding Tube route daily @ 0900  Yes Unknown, Entered By History   Guar Gum (FIBER MODULAR, NUTRISOURCE FIBER,) packet 1 packet by Per Feeding Tube route daily  Yes Unknown, Entered By History   hydrocortisone (CORTEF) 10 MG tablet 5 mg by Oral or Feeding Tube route 2 times daily Take one 5 mg pill by NG at 1500 and 3 x 5 mg pill by ND at 2100.  Yes Reported, Patient   hydrocortisone (CORTEF) 5 MG tablet 3 tablets (15 mg) by Oral or Feeding Tube route every morning During illness please increase the dose as directed.  Yes Faizan Mclean MD   hydrocortisone 1 % CREA cream Place rectally 2 times daily as needed for other Apply to reddened memo areas as needed  Yes Unknown, Entered By History   hydrocortisone sodium succinate PF (SOLU-CORTEF) 100 MG injection Inject 1 mL (50 mg) into the muscle once as needed (If unable to keep oral hydrocortisone due to vomiting.) Dispense Act-O-Vial  Yes Faizan Mclean MD   levothyroxine (SYNTHROID/LEVOTHROID) 150 MCG tablet Take 1 tablet (150 mcg) by mouth every morning At 0500  Yes Faizan Mclean MD   melatonin (MELATONIN) 1 MG/ML LIQD liquid 6 mg by Per NG tube route At Bedtime   Yes Unknown, Entered By History   metoclopramide (REGLAN) 10 MG/10ML SOLN solution Take 10 mg by mouth 4 times daily (before meals and nightly) 0800, 1200, 1600, 2000  Disconnects bag before administration, then waits 45 mins before reconnecting after giving the medication  Yes Unknown, Entered By History   miconazole (MICATIN) 2 % AERP powder Apply topically 2 times daily as needed   Yes Unknown, Entered By History   mupirocin (BACTROBAN) 2 % external ointment Apply topically 2 times  daily as needed   Yes Reported, Patient   pantoprazole (PROTONIX) 2 mg/mL SUSP suspension 20 mLs (40 mg) by Per J Tube route daily  Yes Alvaro Barahona MD   potassium & sodium phosphates (NEUTRA-PHOS) 280-160-250 MG Packet Take 1 packet by mouth 3 times daily   Yes Yanely Liriano MD   prochlorperazine (COMPAZINE) 25 MG suppository Place 1 suppository (25 mg) rectally every 12 hours as needed for nausea or vomiting  Yes Alvaro Barahona MD   Scopolamine HBr POWD Dispense #90. Mix contents with small amount of water for admin via J-tube.  Administer 0.8 mg three times each day.  Yes Jennie Bermudez MD   Skin Protectants, Misc. (BALMEX SKIN PROTECTANT) OINT Externally apply topically 2 times daily as needed (irritation) Applay to reddened memo areas twice daily as needed  Yes Unknown, Entered By History   sodium bicarbonate 650 MG tablet Take 1 tablet (650 mg) by mouth daily  Yes Ricky Torres MD   testosterone cypionate (DEPOTESTOSTERONE) 200 MG/ML injection Inject 0.38 mLs (76 mg) into the muscle every 14 days 4/2/2021 Yes Faizan Mclean MD   trimethoprim-polymyxin b (POLYTRIM) 50352-6.1 UNIT/ML-% ophthalmic solution Place 1 drop into the right eye every 6 hours For 5 days  Yes Unknown, Entered By History   vitamin C (ASCORBIC ACID) 1000 MG TABS 1,000 mg by Oral or Feeding Tube route daily   Yes Unknown, Entered By History   vitamin D3 (CHOLECALCIFEROL) 2000 units (50 mcg) tablet Take 2,000 Units by mouth daily Crush and feed via j-tube @@ 0900  Yes Unknown, Entered By History       The information provided in this note is only as accurate as the sources available at the time of update(s)

## 2021-04-16 NOTE — ED NOTES
"[facility]  ED Nurse Handoff Report    ED Chief complaint: Shortness of Breath      ED Diagnosis:   Final diagnoses:   Acute respiratory failure with hypoxia (H)       Code Status: To be addressed by admitting MD    Allergies:   Allergies   Allergen Reactions     Valproic Acid Other (See Comments)     Toxicity w/ bone marrow suspension, elevated ammonia levels      Dilantin [Phenytoin Sodium] Other (See Comments)     Severe Trembling     Scopolamine Hives     Hives with the patch - oral no problem       Patient Story: Per mother and home RN. Pt taking deeper breaths \"gasping\" all day today . Not making as much eye contact/expressions \"not engaging\". EMS had to suction on transport. 2nd covid vaccine yesterday. 89% on RA for EMS, 2L NC applied for sats 94-95%. Otherwise vss. .   Focused Assessment:  Pt non verbal but understands simple questions. Pt denies sx and appears to be resting calmly, vss. Lungs clear.     Treatments and/or interventions provided: iv start with blood and urine to lab  Patient's response to treatments and/or interventions: pt remains resting calmly    To be done/followed up on inpatient unit:  UA    Does this patient have any cognitive concerns?: Short term memory loss and Disoriented to situation    Activity level - Baseline/Home:  Total Care  Activity Level - Current:   Total Care    Patient's Preferred language: English   Needed?: No    Isolation: NA  Infection: Not Applicable  MRSA  Patient tested for COVID 19 prior to admission: YES  Bariatric?: No    Vital Signs:   Vitals:    04/15/21 2238 04/15/21 2240 04/15/21 2300   BP: 119/80 119/80 115/62   Pulse:  99 95   Resp:  21 23   Temp: 99  F (37.2  C)     TempSrc: Temporal     SpO2: 97% 96% 96%       Cardiac Rhythm:     Was the PSS-3 completed:   Yes  What interventions are required if any?               Family Comments: NA  OBS brochure/video discussed/provided to patient/family: N/A              Name of " person given brochure if not patient: NA              Relationship to patient: NA    For the majority of the shift this patient's behavior was Green.   Behavioral interventions performed were NA.    ED NURSE PHONE NUMBER: [phone]

## 2021-04-16 NOTE — PLAN OF CARE
"Summary: AMS and hypoxia    DATE & TIME: 4/15/21-4/16/21 3774-3130  Cognitive Concerns/ Orientation : Alert and oriented, pt nonverbal. Able to answer some yes/no questions by shaking head or \"thumbs up/down\"  BEHAVIOR & AGGRESSION TOOL COLOR: green  CIWA SCORE: NA  ABNL VS/O2: VSS   MOBILITY: Turned and repositioned q2h. Pt bedfast at baseline.   PAIN MANAGMENT: Denies pain.   DIET: Strict NPO.   BOWEL/BLADDER: Incontinent at times of bladder. Uses bedside urinal.   ABNL LAB/BG: Na: 131, BNP: 101  DRAIN/DEVICES: Ostomy drain L side of abdomen. PIV R wrist infusing D5% NS at 100 ml/hr  TELEMETRY RHYTHM: NA  SKIN: Skin is CDI other than ostomy drain.   TESTS/PROCEDURES: Chest x-ray, chest CT and head CT all done in ED, see results for more info.   D/C DAY/GOALS/PLACE: Pt likely to discharge today.  OTHER IMPORTANT INFO: Pt VSS on RA. Turned and repositioned q2h. Pt ostomy drain output: 325 green output with sediment.  "

## 2021-04-16 NOTE — PROGRESS NOTES
Discharge    Patient discharged to home via [facility] with transportation  Care plan note:vss, on RA, lungs clear. Total care. Mobility at baseline. Discharge info given to mother over the phon, whom verbalized understanding. Pt will follow up with PCP as scheduled. Left unit in stable condition.     Listed belongings gathered and returned to patient. Yes  Belongings returned to patient from security/pharmacy Yes  Care Plan and Patient education resolved: Yes  Prescriptions if needed, hard copies sent with patient  NA  Home and hospital acquired medications returned to patient: NA  Medication Bin checked and emptied on discharge Yes  Follow up appointment made for patient: Yes

## 2021-04-16 NOTE — ED TRIAGE NOTES
"Per mother and home RN. Pt taking deeper breaths \"gasping\" all day today . Not making as much eye contact/expressions \"not engaging\". EMS had to suction on transport. 2nd covid vaccine yesterday. 89% on RA for EMS, 2L NC applied for sats 94-95%. Otherwise vss. .   "
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
## 2021-04-16 NOTE — PLAN OF CARE
RECEIVING UNIT ED HANDOFF REVIEW    ED Nurse Handoff Report was reviewed by: Joan Epperson RN on April 16, 2021 at 1:13 AM

## 2021-04-16 NOTE — ED NOTES
Bed: ED07  Expected date: 4/15/21  Expected time: 10:25 PM  Means of arrival: Ambulance  Comments:  Kerri SINGH resp. Issues; AMS

## 2021-04-16 NOTE — CONSULTS
Care Management Consult with Discharge Information    General Information  Assessment completed with: Parents, Legal Guardian and Mother Savannah  Type of CM/SW Visit: Offer D/C Planning    Primary Care Provider verified and updated as needed: Yes   Readmission within the last 30 days: no previous admission in last 30 days      Reason for Consult: discharge planning  Advance Care Planning: Advance Care Planning Reviewed: no concerns identified  Pt is Full code, in the past, his Guardian has wanted continue Full code    General Information Comments: High readmission risk    Communication Assessment  Patient's communication style: spoken language (English or Bilingual)    Hearing Difficulty or Deaf: no   Wear Glasses or Blind: no    Cognitive  Cognitive/Neuro/Behavioral: WDL                      Living Environment:   People in home: parent(s), other (see comments)  Mother and caregivers  Current living Arrangements: house      Able to return to prior arrangements: yes  Living Arrangement Comments: 24 hr care    Family/Social Support:  Care provided by: Pt lives with his mother.  She has back problems, so is unable to provide physical assistance.  Pt has a 12 hr nurse daily during the day and then has a 12 hr PCA during the evening/night hours.  The PCA also lives with them.    Provides care for: no one  Marital Status: Single                      Current Resources:   Patient receiving home care services: Yes  Skilled Home Care Services: Skilled Nursing, Home Health Aid  Community Resources: County Programs, County Worker, DME, Home Care, PCA  Equipment currently used at home: hospital bed, lift device, wheelchair, power  Supplies currently used at home: Incontinence Supplies, Enteral Nutrition & Supplies    Employment/Financial:  Employment Status: disabled        Financial Concerns: No concerns identified      Finance Comments: active Medicare & Medicaid,  in-home nursing 12 hrs/day via CADI waiver and 12 hours of PCA  services and homemaking services     Lifestyle & Psychosocial Needs:        Socioeconomic History     Marital status: Single     Spouse name: Not on file     Number of children: Not on file     Years of education: Not on file     Highest education level: Not on file     Tobacco Use     Smoking status: Former Smoker     Quit date: 1989     Years since quittin.0     Smokeless tobacco: Never Used   Substance and Sexual Activity     Alcohol use: No     Drug use: No     Sexual activity: Never       Functional Status:  Prior to admission patient needed assistance:   Dependent ADLs:: Bathing, Dressing, Eating, Grooming, Incontinence, Positioning, Transfers, Wheelchair-with assist  Dependent IADLs:: Cleaning, Cooking, Laundry, Shopping, Meal Preparation, Medication Management, Money Management, Transportation, Incontinence       Mental Health Status:  Mental Health Status: No Current Concerns. Pt with history TBI, he is nonverbal      Chemical Dependency Status:  Chemical Dependency Status: No Current Concerns             Values/Beliefs:  Spiritual, Cultural Beliefs, Latter day Practices, Values that affect care:  No               Additional Information:   Planning discharge home today.  Have conversed with pt's Mother (she is also his Legal Guardian).  She wanted to make sure that his neck was OK as she felt it was stiff.  Nsg has assessed this with reports no current issues.  Pt has the following servies:  Per Savannah, pt has the following services in place:  [facility] (phone [phone], fax [phone])  12 hours RN coverage daily   [facility] (12 hours PCA evening / night)   [facility] (phone [phone])  Also supply nursing   [facility]   For TF supplies  Savannah reported recent problems of having some days staffed by the nurse.  The PCA also lives with them, so does fill in when there is no nursing available.      Follow up scheduled with his [facility] PCP on 21 for a phone  appointment.    Pt has an Endocrine appointment in the near future.  Transportation has been scheduled for 1530 today thru [facility] via stretcher.  PCS form is completed  Orders have been faxed to [facility] at ([phone])      Karen Collins RN   Inpatient Care Management  [phone]

## 2021-04-16 NOTE — PROGRESS NOTES
Observation goals PRIOR TO DISCHARGE     Comments:   -diagnostic tests and consults completed and resulted met   -vital signs normal or at patient baseline met  -returns to baseline functional status met  -safe disposition plan has been identified not met  Nurse to notify provider when observation goals have been met and patient is ready for discharge.

## 2021-04-16 NOTE — PROGRESS NOTES
[facility] consult for 'ostomy': Pt well-known to [facility] service.  He has a PEG tube, not an ostomy.  Spoke with Nursing who states PEG site has minimal redness and they are doing standard cares and using drain sponges under bumper.  [facility] has often seen pt in the past for coccyx/perineal IAD.  RN states this area is clear and intact today.  [facility] therefore did not assess pt and will sign off.  Continue standard PEG cares and PIP measures and please reconsult if further issues.

## 2021-04-16 NOTE — PLAN OF CARE
"Summary: AMS and hypoxia    DATE & TIME: 4/16/21 am  Cognitive Concerns/ Orientation : Alert and oriented, pt nonverbal. Able to answer some yes/no questions by shaking head or \"thumbs up/down\"  BEHAVIOR & AGGRESSION TOOL COLOR: green  CIWA SCORE: NA  ABNL VS/O2: VSS, lungs clear, on RA  MOBILITY: Turned and repositioned q2h.   PAIN MANAGMENT: Denies pain.   DIET: Strict NPO. Tube feeding tube--gastric port to drainage bag.   BOWEL/BLADDER: Incontinent of urine.   ABNL LAB/BG: na  DRAIN/DEVICES: . PIV R wrist infusing D5% NS at 100 ml/hr  TELEMETRY RHYTHM: NA  SKIN: Skin is CDI. Coccyx red blanchable.    TESTS/PROCEDURES: na  D/C DAY/GOALS/PLACE: back to home at 1530 via [facility] transportation.   OTHER IMPORTANT INFO: spoke with mother, she stated pt had gotten Covid vaccine several days ago. This could be the contributing factor for pt's admitting symptoms. Pt's mother worried about pt's neck stiffness. No signs of pain or difficulty in mobility when writer assessed. Pt denied pain.   "

## 2021-04-16 NOTE — ED PROVIDER NOTES
History   Chief Complaint:  Shortness of Breath       HPI   Keyon Farias is a 60 year old male who presents for evaluation of altered mental status and shortness of breath.  History is obtained from EMS, nursing staff, the patient's mother.  He has been verbal and provides no further history.  His mother states that he has been exhibiting changes in mental status today.  She reported that he is not as interactive as he normally has.  She felt that his neck was very stiff.  She also reports that he was gasping at times seemed like he was having difficulty breathing.  She is concerned he may have had a seizure but witnessed any seizure-like activity.    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   The patient presents to the ER via EMS    Physical Exam     Patient Vitals for the past 24 hrs:   BP Temp Temp src Pulse Resp SpO2   04/15/21 2300 115/62 -- -- 95 23 96 %   04/15/21 2240 119/80 -- -- 99 21 96 %   04/15/21 2238 119/80 99  F (37.2  C) Temporal -- -- 97 %       Physical Exam  Constitutional: Well appearing.  HEENT: Atraumatic.  PERRL.   Moist mucous membranes.  Neck: Soft.  Supple.  No JVD.  Cardiac: Regular rate and rhythm.  No murmur or  rub.  Respiratory: Clear to auscultation bilaterally.  No respiratory distress.   Abdomen: Soft apparently nontender.  G-tube present left abdomen without any surrounding abnormalities.  Nondistended.  Musculoskeletal: No edema.  Normal range of motion.  Neurologic: Alert and tracks examiner of the eyes.  Moving the upper extremities.  Skin: No rashes.  No edema.  Psych: Nonverbal.  Gives a thumbs up.    Emergency Department Course   ECG  ECG taken at 0021  Normal sinus rhythm  Left axis deviation    No significant change when compared to EKG dated 2/19/20.   Rate 74 bpm. AK interval 164 ms. QRS duration 90 ms. QT/QTc 432/479 ms. P-R-T axes 54 -31 25.     Imaging:  XR Chest Port 1 View  IMPRESSION: Minimal change. Low lung volumes with slight interstitial prominence but no new focal pneumonia. Minimal blunting at left costophrenic angle unchanged. Heart size likely upper limits of normal.  Reading per radiology     CT Chest pulmonary embolism   IMPRESSION:   1.  No visualized pulmonary embolus.   2.  A few ill-defined ground-glass opacities within both lungs and minimal patchy opacities in the dependent aspects of both lungs most likely related to atelectasis. An infectious or inflammatory process cannot be excluded.   Reading per radiology    CT Head without contrast  IMPRESSION:   1.  No CT evidence of acute intracranial hemorrhage, mass or recent infarct.   2.  Extensive presumed posttraumatic encephalomalacia most pronounced in the left temporal lobe.   3.  Underlying moderate volume loss.   4.  There are chronic inflammatory changes involving the unilocular sphenoid sinus. There is central increased attenuation within the sinus which can be seen the setting of inspissated secretions and fungal colonization.  Reading per radiology     Laboratory:   CBC: WBC 6.9, HGB 12.0(L),    CMP: glucose 118(H), calcium 8.4(L), albumin 3.2(L) o/w WNL (Creatinine 0.82)   Lactic acid (result time 238) 1.3  Troponin  (Collected 2250): <0.015     BNP: 101    Symptomatic Influenza A/B & SARS-CoV2 (COVID19) Virus PCR Multiplex: all negative      Emergency Department Course:    Reviewed:  I reviewed nursing notes, vitals, past medical history and care everywhere    Assessments:  2315 I obtained history and examined the patient as noted above.   0007 I spoke with the patient's mother on the phone regarding his presentation and plan of care.   0035 I rechecked the patient.     Consults:   0020 I spoke with Dr. Villavciencio of the Hospitalist service from [facility] regarding patient's presentation, findings, and plan of care.     Interventions:  2252 NS 1000 ml IV    Disposition:  The patient was admitted to the hospital under the care of Dr. Villavicencio.       Impression & Plan   Medical Decision Making:  Keyon Farias is a 60-year-old man who is afebrile and hemodynamically stable.  He is slightly hypoxic and requiring nasal cannula oxygen which is not his baseline.  X-ray read by radiology without acute abnormality.  I did obtain a CT scan of the chest to rule out a PE and there is no evidence of PE with some opacities in each lung favored to be atelectasis.  He has no leukocytosis or fever.  He has a known history of pneumonitis secondary to aspiration.  At this point, I discussed with the hospitalist service and will hold off on antibiotics and admit for observation given his oxygen requirement.  CT scan of the head was unremarkable.  I spoke with the mother is in agreement with the plan.  He was in stable condition at time of admission.    Covid-19  Keyon Farias was evaluated during a global COVID-19 pandemic, which necessitated consideration that the patient might be at risk for infection with the SARS-CoV-2 virus that causes COVID-19.   Applicable protocols for evaluation were followed during the patient's care.   COVID-19 was considered as part of the patient's evaluation. The plan for testing is:  a test was obtained during this  visit.    Diagnosis:    ICD-10-CM    1. Acute respiratory failure with hypoxia (H)  J96.01        Scribe Disclosure:  I, Corinna Hwang, am serving as a scribe at 11:07 PM on 4/15/2021 to document services personally performed by Robb Foley MD based on my observations and the provider's statements to me.            Robb Foley MD  04/16/21 0757

## 2021-04-22 ENCOUNTER — HOSPITAL ENCOUNTER (OUTPATIENT)
Dept: LAB | Facility: CLINIC | Age: 61
Discharge: HOME | End: 2021-04-22
Attending: INTERNAL MEDICINE | Admitting: INTERNAL MEDICINE
Payer: MEDICARE

## 2021-04-22 DIAGNOSIS — Z79.890 ENCOUNTER FOR MONITORING TESTOSTERONE REPLACEMENT THERAPY: ICD-10-CM

## 2021-04-22 DIAGNOSIS — E03.8 SECONDARY HYPOTHYROIDISM: ICD-10-CM

## 2021-04-22 DIAGNOSIS — Z51.81 ENCOUNTER FOR MONITORING TESTOSTERONE REPLACEMENT THERAPY: ICD-10-CM

## 2021-04-22 DIAGNOSIS — E87.1 HYPONATREMIA: ICD-10-CM

## 2021-04-22 DIAGNOSIS — E29.1 HYPOGONADISM MALE: ICD-10-CM

## 2021-04-22 DIAGNOSIS — E23.0 PANHYPOPITUITARISM (H): ICD-10-CM

## 2021-04-22 DIAGNOSIS — E23.2 DIABETES INSIPIDUS (H): ICD-10-CM

## 2021-04-22 LAB
ANION GAP SERPL CALCULATED.3IONS-SCNC: 6 MMOL/L (ref 3–14)
BACTERIA SPEC CULT: NO GROWTH
BUN SERPL-MCNC: 21 MG/DL (ref 7–30)
CALCIUM SERPL-MCNC: 9.1 MG/DL (ref 8.5–10.1)
CHLORIDE SERPL-SCNC: 100 MMOL/L (ref 94–109)
CO2 SERPL-SCNC: 30 MMOL/L (ref 20–32)
CREAT SERPL-MCNC: 0.87 MG/DL (ref 0.66–1.25)
GFR SERPL CREATININE-BSD FRML MDRD: >90 ML/MIN/{1.73_M2}
GLUCOSE SERPL-MCNC: 159 MG/DL (ref 70–99)
POTASSIUM SERPL-SCNC: 3.9 MMOL/L (ref 3.4–5.3)
PSA SERPL-MCNC: 0.23 UG/L (ref 0–4)
SODIUM SERPL-SCNC: 136 MMOL/L (ref 133–144)
SPECIMEN SOURCE: NORMAL
T4 FREE SERPL-MCNC: 1.4 NG/DL (ref 0.76–1.46)

## 2021-04-22 PROCEDURE — 36415 COLL VENOUS BLD VENIPUNCTURE: CPT | Performed by: INTERNAL MEDICINE

## 2021-04-22 PROCEDURE — 80048 BASIC METABOLIC PNL TOTAL CA: CPT | Performed by: INTERNAL MEDICINE

## 2021-04-22 PROCEDURE — 84153 ASSAY OF PSA TOTAL: CPT | Mod: GZ | Performed by: INTERNAL MEDICINE

## 2021-04-22 PROCEDURE — 84403 ASSAY OF TOTAL TESTOSTERONE: CPT | Performed by: INTERNAL MEDICINE

## 2021-04-22 PROCEDURE — 84439 ASSAY OF FREE THYROXINE: CPT | Performed by: INTERNAL MEDICINE

## 2021-04-24 LAB — TESTOST SERPL-MCNC: 31 NG/DL (ref 240–950)

## 2021-04-26 NOTE — PHARMACY-VANCOMYCIN DOSING SERVICE
Pharmacy Vancomycin Initial Note  Date of Service 2019  Patient's  1960  59 year old, male    Indication: [facility] Pneumonia    Current estimated CrCl = Estimated Creatinine Clearance: 108.9 mL/min (based on SCr of 0.72 mg/dL).    Creatinine for last 3 days  2019:  8:37 PM Creatinine 0.72 mg/dL    Recent Vancomycin Level(s) for last 3 days  No results found for requested labs within last 72 hours.      Vancomycin IV Administrations (past 72 hours)                   vancomycin 1500 mg in 0.9% NaCl 250 ml intermittent infusion 1,500 mg (mg) 1,500 mg Given 19 2208                Nephrotoxins and other renal medications (From now, onward)    Start     Dose/Rate Route Frequency Ordered Stop    19 0800  vancomycin 1500 mg in 0.9% NaCl 250 ml intermittent infusion 1,500 mg      1,500 mg  over 90 Minutes Intravenous EVERY 12 HOURS 19 2310      19 0300  piperacillin-tazobactam (ZOSYN) 3.375 g vial to attach to  mL bag     Note to Pharmacy:  Pharmacy can adjust dose based on renal function.    3.375 g  over 30 Minutes Intravenous EVERY 6 HOURS 19 2254            Contrast Orders - past 72 hours (72h ago, onward)    None                Plan:  1.  Start vancomycin  1500 mg IV q12h.   2.  Goal Trough Level: 15-20 mg/L   3.  Pharmacy will check trough levels as appropriate in 1-3 Days.    4. Serum creatinine levels will be ordered daily for the first week of therapy and at least twice weekly for subsequent weeks.    5. Monroe method utilized to dose vancomycin therapy: Method 1    Adarsh Moncada [facility]      
[Follow-Up: _____] : a [unfilled] follow-up visit

## 2021-04-27 NOTE — PROGRESS NOTES
Keyon Farias  is being evaluated via a billable video visit.      How would you like to obtain your AVS? Mail a copy  For the video visit, send the invitation by: Text to cell phone: [phone]  Will anyone else be joining your video visit? No

## 2021-04-28 ENCOUNTER — VIRTUAL VISIT (OUTPATIENT)
Dept: ENDOCRINOLOGY | Facility: CLINIC | Age: 61
End: 2021-04-28
Payer: MEDICARE

## 2021-04-28 DIAGNOSIS — E03.8 SECONDARY HYPOTHYROIDISM: ICD-10-CM

## 2021-04-28 DIAGNOSIS — E27.49 SECONDARY ADRENAL INSUFFICIENCY (H): ICD-10-CM

## 2021-04-28 DIAGNOSIS — Z51.81 ENCOUNTER FOR MONITORING TESTOSTERONE REPLACEMENT THERAPY: ICD-10-CM

## 2021-04-28 DIAGNOSIS — E23.0 PANHYPOPITUITARISM (H): Primary | ICD-10-CM

## 2021-04-28 DIAGNOSIS — E23.2 DIABETES INSIPIDUS (H): ICD-10-CM

## 2021-04-28 DIAGNOSIS — Z79.890 ENCOUNTER FOR MONITORING TESTOSTERONE REPLACEMENT THERAPY: ICD-10-CM

## 2021-04-28 DIAGNOSIS — E29.1 HYPOGONADISM MALE: ICD-10-CM

## 2021-04-28 PROCEDURE — 99215 OFFICE O/P EST HI 40 MIN: CPT | Mod: 95 | Performed by: INTERNAL MEDICINE

## 2021-04-28 RX ORDER — HYDROCORTISONE 5 MG/1
15 TABLET ORAL EVERY MORNING
Qty: 270 TABLET | Refills: 3
Start: 2021-04-28 | End: 2021-07-28

## 2021-04-28 RX ORDER — TESTOSTERONE CYPIONATE 200 MG/ML
76 INJECTION, SOLUTION INTRAMUSCULAR
Qty: 6 ML | Refills: 1 | Status: SHIPPED | OUTPATIENT
Start: 2021-04-28 | End: 2021-07-28

## 2021-04-28 NOTE — PATIENT INSTRUCTIONS
Mykel & Savannah   It was a pleasure meeting you.    Central diabetes insipidus   -Follow free water replacement therapy per recent discharge recommendation which was 120 mL every 4 hours.  - change in mental status from baseline should prompt sodium level checks.   - If urine output increases to more than 3 L please check sodium level.   - There is a standing order for Sodium checks as needed.      Secondary adrenal insufficiency  -Hydrocortisone (HC) 15 mg in the AM, 5 mg at 3:00 am.   - sick day rules - increase hydrocortisone to 3X of the maintenance dose during sickness like flu.  (for eg. Hydrocortisone to be increased to 15 mg 3 times per day during illness until he gets better). Then after he improves from illness; dose needs to go back to his usual dose of 15 mg in the morning and 5 mg in the afternoon.   -Needs injection form of hydrocortisone if unable to keep medication down due to vomiting.    - Injectable form of hydrocortisone sent to the pharmacy to be used as needed.     Central hypothyroidism   -continue Levothyroxine 150 mcg daily     Central hypogonadism  Increase testosterone dose to 0.38 ml (76 mg) every 7 days (from every 14 day).     Please check testosterone level half way between injections.   And will recheck follow-up testosterone level and other routine labs in 3 months prior to his next appointment.      Please let us know if any questions.

## 2021-04-28 NOTE — PROGRESS NOTES
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  During his hospitalization few months ago; he was noted to have polyuria with up to 10 L in 24 hours in the setting of elevated sodium level at 168.  Patient responded to DDAVP treatment.  At discharge patient was discharged on DDAVP.  Follow up blood work later on showed hyponatremia on DDAVP therefore DDAVP was discontinued.  He has been off of DDAVP now for few months now.  Last sodium checked without DDAVP is a stable 4/21.     Going forward monitor for central diabetes insipidus clinically.  Clinical changes or a urine output more than 3 L should prompt a sodium check.  Standing order for BMP.      Secondary adrenal insufficiency  Continue Hydrocortisone (HC) 15 mg in the AM, 5mg at 3:00 PM     The lowest effective dose of hydrocortisone is what is recommended to be used.  - sick day rules - increase hydrocortisone to 2-3X of the maintenance dose during sickness like flu. Needs injection if unable to keep medication down due to vomiting.   Injectable form of hydrocortisone sent to the pharmacy to be used as needed.      Central hypothyroidism   -continue Levothyroxine 150 mcg daily      Central hypogonadism   - follow up testosterone low.   - increase testosterone dose to 76 mg every week (from every two weeks)    -check Testosterone level in 2-3 months    -Check PSA.      I will contact the patient with the test results.  Return to clinic in 3 months.  Patient Instructions   Mykel & Savannah   It was a pleasure meeting you.    Central diabetes insipidus   -Follow free water replacement therapy per recent discharge recommendation which was 120 mL every 4 hours.  - change in mental status  from baseline should prompt sodium level checks.   - If urine output increases to more than 3 L please check sodium level.   - There is a standing order for Sodium checks as needed.      Secondary adrenal insufficiency  -Hydrocortisone (HC) 15 mg in the AM, 5 mg at 3:00 am.   - sick day rules - increase hydrocortisone to 3X of the maintenance dose during sickness like flu.  (for eg. Hydrocortisone to be increased to 15 mg 3 times per day during illness until he gets better). Then after he improves from illness; dose needs to go back to his usual dose of 15 mg in the morning and 5 mg in the afternoon.   -Needs injection form of hydrocortisone if unable to keep medication down due to vomiting.    - Injectable form of hydrocortisone sent to the pharmacy to be used as needed.     Central hypothyroidism   -continue Levothyroxine 150 mcg daily     Central hypogonadism  Increase testosterone dose to 0.38 ml (76 mg) every 7 days (from every 14 day).     Please check testosterone level half way between injections.   And will recheck follow-up testosterone level and other routine labs in 3 months prior to his next appointment.      Please let us know if any questions.      Test and/or medications prescribed today:  Orders Placed This Encounter   Procedures     Testosterone total     Basic metabolic panel       Faizan Mclean MD  Staff Endocrinologist    Division of Endocrinology and Diabetes      Subjective:         HPI: Keyon Farias is a 60 year old male with history of panhypopituitarism who is here to establish care.  He was recently discharged from the hospital after been treated for infection.  Patient has had multiple hospitalization over the last 12 months for aspiration pneumonia.    Patient's mom reports today that Mykel is at baseline. Communicates by moving his eyebrows and also verbalizing a word.     Urine output has been stable between 1-1.5 L/day.  Free water administration has been about 250 cc every 4  hours.      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  During his recent hospitalization he was noted to have polyuria with up to 10 L in 24 hours in the setting of elevated sodium level at 168.  Patient responded to multiple DDAVP treatment.  DDAVP was continued briefly however later on was stopped due to hyponatremia.  He has been off of DDAVP for over a month now.  Recently done sodium is stable.  Noted that with infections he gets hyponatremia intermittently.     Secondary adrenal insufficiency  Currently on hydrocortisone (HC) 20mg in the AM, 10mg with dinner.         Central hypothyroidism   Currently on levothyroxine 150 mcg daily        Central hypogonadism   Currently on testosterone injection 76 mg into the medicine every 2 weeks.  Recently done hematocrit has been stable.        Allergies   Allergen Reactions     Valproic Acid Other (See Comments)     Toxicity w/ bone marrow suspension, elevated ammonia levels      Dilantin [Phenytoin Sodium] Other (See Comments)     Severe Trembling     Scopolamine Hives     Hives with the patch - oral no problem       Current Outpatient Medications   Medication Sig Dispense Refill     acetaminophen (TYLENOL 8 HOUR) 650 MG CR tablet Take 650 mg by mouth every 8 hours as needed for mild pain or fever       acetylcysteine (MUCOMYST) 20 % neb solution Take 2 mLs by nebulization 4 times daily With albuterol at 0700, 1100, 1500, and 1900        albuterol (PROVENTIL) (5 MG/ML) 0.5% neb solution Take 2.5 mg by nebulization every 4 hours (while awake) 0700 1100 1500 1900 with mucomyst        aspirin (ASA) 81 MG chewable tablet 81 mg by Oral or Feeding Tube route daily At 0900       bacitracin ointment Apply topically daily as needed for wound care To PEG site.        Brivaracetam (BRIVIACT) 10 MG/ML solution 100 mg by Oral or Feeding Tube route 2 times daily 0900, 2100       calcium  carbonate 1250 MG/5ML SUSP suspension Take 1,250 mg by mouth 3 times daily 0900, 1500, 2100       carBAMazepine (TEGRETOL) 100 MG/5ML suspension Take 100 mg by mouth daily Take at 1800        carBAMazepine (TEGRETOL) 100 MG/5ML suspension 150 mg by Oral or Feeding Tube route 3 times daily At 06:00, 12:00, and 24:00 for seizures       clotrimazole-betamethasone (LOTRISONE) 1-0.05 % external cream Apply topically 2 times daily as needed        ferrous sulfate 220 (44 Fe) MG/5ML ELIX 220 mg by Per Feeding Tube route daily @ 0900       Guar Gum (FIBER MODULAR, NUTRISOURCE FIBER,) packet 1 packet by Per Feeding Tube route daily       hydrocortisone (CORTEF) 5 MG tablet 3 tablets (15 mg) by Oral or Feeding Tube route every morning And 1 tablet (5 mg) at 2:00 PM. During illness please increase the dose as directed. 270 tablet 3     hydrocortisone 1 % CREA cream Place rectally 2 times daily as needed for other Apply to reddened memo areas as needed       hydrocortisone sodium succinate PF (SOLU-CORTEF) 100 MG injection Inject 1 mL (50 mg) into the muscle once as needed (If unable to keep oral hydrocortisone due to vomiting.) Dispense Act-O-Vial 10 mL 1     levothyroxine (SYNTHROID/LEVOTHROID) 150 MCG tablet Take 1 tablet (150 mcg) by mouth every morning At 0500 90 tablet 1     melatonin (MELATONIN) 1 MG/ML LIQD liquid 6 mg by Per NG tube route At Bedtime        metoclopramide (REGLAN) 10 MG/10ML SOLN solution Take 10 mg by mouth 4 times daily (before meals and nightly) 0800, 1200, 1600, 2000  Disconnects bag before administration, then waits 45 mins before reconnecting after giving the medication       miconazole (MICATIN) 2 % AERP powder Apply topically 2 times daily as needed        mupirocin (BACTROBAN) 2 % external ointment Apply topically 2 times daily as needed        pantoprazole (PROTONIX) 2 mg/mL SUSP suspension 20 mLs (40 mg) by Per J Tube route daily 400 mL 0     potassium & sodium phosphates (NEUTRA-PHOS)  280-160-250 MG Packet Take 1 packet by mouth 3 times daily        prochlorperazine (COMPAZINE) 25 MG suppository Place 1 suppository (25 mg) rectally every 12 hours as needed for nausea or vomiting 15 suppository 0     Scopolamine HBr POWD Dispense #90. Mix contents with small amount of water for admin via J-tube.  Administer 0.8 mg three times each day. 90 Bottle 3     Skin Protectants, Misc. (BALMEX SKIN PROTECTANT) OINT Externally apply topically 2 times daily as needed (irritation) Applay to reddened memo areas twice daily as needed       sodium bicarbonate 650 MG tablet Take 1 tablet (650 mg) by mouth daily 30 tablet 0     testosterone cypionate (DEPOTESTOSTERONE) 200 MG/ML injection Inject 0.38 mLs (76 mg) into the muscle every 7 days 6 mL 1     trimethoprim-polymyxin b (POLYTRIM) 09763-5.1 UNIT/ML-% ophthalmic solution Place 1 drop into the right eye every 6 hours For 5 days       vitamin C (ASCORBIC ACID) 1000 MG TABS 1,000 mg by Oral or Feeding Tube route daily        vitamin D3 (CHOLECALCIFEROL) 2000 units (50 mcg) tablet Take 2,000 Units by mouth daily Crush and feed via j-tube @@ 0900         Review of Systems      as per HPI above      Objective:   Mykel is engaged today.  Tries to use words to communicate.  Other exam was not completed as this was virtual visit    In House Labs:   Hemoglobin A1C   Date Value Ref Range Status   08/12/2020 5.9 (H) 0 - 5.6 % Final     Comment:     Normal <5.7% Prediabetes 5.7-6.4%  Diabetes 6.5% or higher - adopted from ADA   consensus guidelines.     08/12/2019 6.1 (H) 0 - 5.6 % Final     Comment:     Normal <5.7% Prediabetes 5.7-6.4%  Diabetes 6.5% or higher - adopted from ADA   consensus guidelines.     05/07/2019 6.1 (H) 0 - 5.6 % Final     Comment:     Normal <5.7% Prediabetes 5.7-6.4%  Diabetes 6.5% or higher - adopted from ADA   consensus guidelines.         T4 Free   Date Value Ref Range Status   04/22/2021 1.40 0.76 - 1.46 ng/dL Final         Creatinine   Date  Value Ref Range Status   04/22/2021 0.87 0.66 - 1.25 mg/dL Final   ]  Last Comprehensive Metabolic Panel:  Sodium   Date Value Ref Range Status   04/22/2021 136 133 - 144 mmol/L Final     Potassium   Date Value Ref Range Status   04/22/2021 3.9 3.4 - 5.3 mmol/L Final     Chloride   Date Value Ref Range Status   04/22/2021 100 94 - 109 mmol/L Final     Carbon Dioxide   Date Value Ref Range Status   04/22/2021 30 20 - 32 mmol/L Final     Anion Gap   Date Value Ref Range Status   04/22/2021 6 3 - 14 mmol/L Final     Glucose   Date Value Ref Range Status   04/22/2021 159 (H) 70 - 99 mg/dL Final     Urea Nitrogen   Date Value Ref Range Status   04/22/2021 21 7 - 30 mg/dL Final     Creatinine   Date Value Ref Range Status   04/22/2021 0.87 0.66 - 1.25 mg/dL Final     GFR Estimate   Date Value Ref Range Status   04/22/2021 >90 >60 mL/min/[1.73_m2] Final     Comment:     Non  GFR Calc  Starting 12/18/2018, serum creatinine based estimated GFR (eGFR) will be   calculated using the Chronic Kidney Disease Epidemiology Collaboration   (CKD-EPI) equation.       Calcium   Date Value Ref Range Status   04/22/2021 9.1 8.5 - 10.1 mg/dL Final       Video-Visit Details    Type of service:  Video Visit = 12 minutes.   Originating Location (pt. Location): Home  Distant Location (provider location):  [facility]   Platform used for Video Visit: Doximity   minutes spent on the date of the encounter doing chart review, history, documentation and further activities per the note.

## 2021-04-28 NOTE — LETTER
4/28/2021       RE: Keyon Farias  [address] [place] [address]     Dear Colleague,    Thank you for referring your patient, Keyon Farias, to the [facility] [place] [facility]. Please see a copy of my visit note below.    Keyon Farias  is being evaluated via a billable video visit.      How would you like to obtain your AVS? Mail a copy  For the video visit, send the invitation by: Text to cell phone: [phone]  Will anyone else be joining your video visit? 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  During his hospitalization few months ago; he was noted to have polyuria with up to 10 L in 24 hours in the setting of elevated sodium level at 168.  Patient responded to DDAVP treatment.  At discharge patient was discharged on DDAVP.  Follow up blood work later on showed hyponatremia on DDAVP therefore DDAVP was discontinued.  He has been off of DDAVP now for few months now.  Last sodium checked without DDAVP is a stable 4/21.     Going forward monitor for central diabetes insipidus clinically.  Clinical changes or a urine output more than 3 L should prompt a sodium check.  Standing order for BMP.      Secondary adrenal insufficiency  Continue Hydrocortisone (HC) 15 mg in the AM, 5mg at 3:00 PM     The lowest effective dose of hydrocortisone is what is recommended to be used.  - sick day rules - increase hydrocortisone to 2-3X of the maintenance dose during sickness like flu. Needs injection if unable to keep medication down due to vomiting.   Injectable form of hydrocortisone sent to the pharmacy to be used as needed.      Central  hypothyroidism   -continue Levothyroxine 150 mcg daily      Central hypogonadism   - follow up testosterone low.   - increase testosterone dose to 76 mg every week (from every two weeks)    -check Testosterone level in 2-3 months    -Check PSA.      I will contact the patient with the test results.  Return to clinic in 3 months.  Patient Instructions   Mykel & Savannah   It was a pleasure meeting you.    Central diabetes insipidus   -Follow free water replacement therapy per recent discharge recommendation which was 120 mL every 4 hours.  - change in mental status from baseline should prompt sodium level checks.   - If urine output increases to more than 3 L please check sodium level.   - There is a standing order for Sodium checks as needed.      Secondary adrenal insufficiency  -Hydrocortisone (HC) 15 mg in the AM, 5 mg at 3:00 am.   - sick day rules - increase hydrocortisone to 3X of the maintenance dose during sickness like flu.  (for eg. Hydrocortisone to be increased to 15 mg 3 times per day during illness until he gets better). Then after he improves from illness; dose needs to go back to his usual dose of 15 mg in the morning and 5 mg in the afternoon.   -Needs injection form of hydrocortisone if unable to keep medication down due to vomiting.    - Injectable form of hydrocortisone sent to the pharmacy to be used as needed.     Central hypothyroidism   -continue Levothyroxine 150 mcg daily     Central hypogonadism  Increase testosterone dose to 0.38 ml (76 mg) every 7 days (from every 14 day).     Please check testosterone level half way between injections.   And will recheck follow-up testosterone level and other routine labs in 3 months prior to his next appointment.      Please let us know if any questions.      Test and/or medications prescribed today:  Orders Placed This Encounter   Procedures     Testosterone total     Basic metabolic panel       Faizan Mclean MD  Staff Endocrinologist     Division of Endocrinology and Diabetes      Subjective:         HPI: Keyon Farias is a 60 year old male with history of panhypopituitarism who is here to establish care.  He was recently discharged from the hospital after been treated for infection.  Patient has had multiple hospitalization over the last 12 months for aspiration pneumonia.    Patient's mom reports today that Mykel is at baseline. Communicates by moving his eyebrows and also verbalizing a word.     Urine output has been stable between 1-1.5 L/day.  Free water administration has been about 250 cc every 4 hours.      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  During his recent hospitalization he was noted to have polyuria with up to 10 L in 24 hours in the setting of elevated sodium level at 168.  Patient responded to multiple DDAVP treatment.  DDAVP was continued briefly however later on was stopped due to hyponatremia.  He has been off of DDAVP for over a month now.  Recently done sodium is stable.  Noted that with infections he gets hyponatremia intermittently.     Secondary adrenal insufficiency  Currently on hydrocortisone (HC) 20mg in the AM, 10mg with dinner.         Central hypothyroidism   Currently on levothyroxine 150 mcg daily        Central hypogonadism   Currently on testosterone injection 76 mg into the medicine every 2 weeks.  Recently done hematocrit has been stable.        Allergies   Allergen Reactions     Valproic Acid Other (See Comments)     Toxicity w/ bone marrow suspension, elevated ammonia levels      Dilantin [Phenytoin Sodium] Other (See Comments)     Severe Trembling     Scopolamine Hives     Hives with the patch - oral no problem       Current Outpatient Medications   Medication Sig Dispense Refill     acetaminophen (TYLENOL 8 HOUR) 650 MG CR tablet Take 650 mg by mouth every 8 hours as needed for mild pain or fever        acetylcysteine (MUCOMYST) 20 % neb solution Take 2 mLs by nebulization 4 times daily With albuterol at 0700, 1100, 1500, and 1900        albuterol (PROVENTIL) (5 MG/ML) 0.5% neb solution Take 2.5 mg by nebulization every 4 hours (while awake) 0700 1100 1500 1900 with mucomyst        aspirin (ASA) 81 MG chewable tablet 81 mg by Oral or Feeding Tube route daily At 0900       bacitracin ointment Apply topically daily as needed for wound care To PEG site.        Brivaracetam (BRIVIACT) 10 MG/ML solution 100 mg by Oral or Feeding Tube route 2 times daily 0900, 2100       calcium carbonate 1250 MG/5ML SUSP suspension Take 1,250 mg by mouth 3 times daily 0900, 1500, 2100       carBAMazepine (TEGRETOL) 100 MG/5ML suspension Take 100 mg by mouth daily Take at 1800        carBAMazepine (TEGRETOL) 100 MG/5ML suspension 150 mg by Oral or Feeding Tube route 3 times daily At 06:00, 12:00, and 24:00 for seizures       clotrimazole-betamethasone (LOTRISONE) 1-0.05 % external cream Apply topically 2 times daily as needed        ferrous sulfate 220 (44 Fe) MG/5ML ELIX 220 mg by Per Feeding Tube route daily @ 0900       Guar Gum (FIBER MODULAR, NUTRISOURCE FIBER,) packet 1 packet by Per Feeding Tube route daily       hydrocortisone (CORTEF) 5 MG tablet 3 tablets (15 mg) by Oral or Feeding Tube route every morning And 1 tablet (5 mg) at 2:00 PM. During illness please increase the dose as directed. 270 tablet 3     hydrocortisone 1 % CREA cream Place rectally 2 times daily as needed for other Apply to reddened memo areas as needed       hydrocortisone sodium succinate PF (SOLU-CORTEF) 100 MG injection Inject 1 mL (50 mg) into the muscle once as needed (If unable to keep oral hydrocortisone due to vomiting.) Dispense Act-O-Vial 10 mL 1     levothyroxine (SYNTHROID/LEVOTHROID) 150 MCG tablet Take 1 tablet (150 mcg) by mouth every morning At 0500 90 tablet 1     melatonin (MELATONIN) 1 MG/ML LIQD liquid 6 mg by Per NG tube route At Bedtime         metoclopramide (REGLAN) 10 MG/10ML SOLN solution Take 10 mg by mouth 4 times daily (before meals and nightly) 0800, 1200, 1600, 2000  Disconnects bag before administration, then waits 45 mins before reconnecting after giving the medication       miconazole (MICATIN) 2 % AERP powder Apply topically 2 times daily as needed        mupirocin (BACTROBAN) 2 % external ointment Apply topically 2 times daily as needed        pantoprazole (PROTONIX) 2 mg/mL SUSP suspension 20 mLs (40 mg) by Per J Tube route daily 400 mL 0     potassium & sodium phosphates (NEUTRA-PHOS) 280-160-250 MG Packet Take 1 packet by mouth 3 times daily        prochlorperazine (COMPAZINE) 25 MG suppository Place 1 suppository (25 mg) rectally every 12 hours as needed for nausea or vomiting 15 suppository 0     Scopolamine HBr POWD Dispense #90. Mix contents with small amount of water for admin via J-tube.  Administer 0.8 mg three times each day. 90 Bottle 3     Skin Protectants, Misc. (BALMEX SKIN PROTECTANT) OINT Externally apply topically 2 times daily as needed (irritation) Applay to reddened memo areas twice daily as needed       sodium bicarbonate 650 MG tablet Take 1 tablet (650 mg) by mouth daily 30 tablet 0     testosterone cypionate (DEPOTESTOSTERONE) 200 MG/ML injection Inject 0.38 mLs (76 mg) into the muscle every 7 days 6 mL 1     trimethoprim-polymyxin b (POLYTRIM) 40861-2.1 UNIT/ML-% ophthalmic solution Place 1 drop into the right eye every 6 hours For 5 days       vitamin C (ASCORBIC ACID) 1000 MG TABS 1,000 mg by Oral or Feeding Tube route daily        vitamin D3 (CHOLECALCIFEROL) 2000 units (50 mcg) tablet Take 2,000 Units by mouth daily Crush and feed via j-tube @@ 0900         Review of Systems      as per HPI above      Objective:   Mykel is engaged today.  Tries to use words to communicate.  Other exam was not completed as this was virtual visit    In House Labs:   Hemoglobin A1C   Date Value Ref Range Status   08/12/2020  5.9 (H) 0 - 5.6 % Final     Comment:     Normal <5.7% Prediabetes 5.7-6.4%  Diabetes 6.5% or higher - adopted from ADA   consensus guidelines.     08/12/2019 6.1 (H) 0 - 5.6 % Final     Comment:     Normal <5.7% Prediabetes 5.7-6.4%  Diabetes 6.5% or higher - adopted from ADA   consensus guidelines.     05/07/2019 6.1 (H) 0 - 5.6 % Final     Comment:     Normal <5.7% Prediabetes 5.7-6.4%  Diabetes 6.5% or higher - adopted from ADA   consensus guidelines.         T4 Free   Date Value Ref Range Status   04/22/2021 1.40 0.76 - 1.46 ng/dL Final         Creatinine   Date Value Ref Range Status   04/22/2021 0.87 0.66 - 1.25 mg/dL Final   ]  Last Comprehensive Metabolic Panel:  Sodium   Date Value Ref Range Status   04/22/2021 136 133 - 144 mmol/L Final     Potassium   Date Value Ref Range Status   04/22/2021 3.9 3.4 - 5.3 mmol/L Final     Chloride   Date Value Ref Range Status   04/22/2021 100 94 - 109 mmol/L Final     Carbon Dioxide   Date Value Ref Range Status   04/22/2021 30 20 - 32 mmol/L Final     Anion Gap   Date Value Ref Range Status   04/22/2021 6 3 - 14 mmol/L Final     Glucose   Date Value Ref Range Status   04/22/2021 159 (H) 70 - 99 mg/dL Final     Urea Nitrogen   Date Value Ref Range Status   04/22/2021 21 7 - 30 mg/dL Final     Creatinine   Date Value Ref Range Status   04/22/2021 0.87 0.66 - 1.25 mg/dL Final     GFR Estimate   Date Value Ref Range Status   04/22/2021 >90 >60 mL/min/[1.73_m2] Final     Comment:     Non  GFR Calc  Starting 12/18/2018, serum creatinine based estimated GFR (eGFR) will be   calculated using the Chronic Kidney Disease Epidemiology Collaboration   (CKD-EPI) equation.       Calcium   Date Value Ref Range Status   04/22/2021 9.1 8.5 - 10.1 mg/dL Final       Video-Visit Details    Type of service:  Video Visit = 12 minutes.   Originating Location (pt. Location): Home  Distant Location (provider location):  [facility]   Platform used for  Video Visit: Doximity   minutes spent on the date of the encounter doing chart review, history, documentation and further activities per the note.

## 2021-05-06 ENCOUNTER — HOSPITAL ENCOUNTER (OUTPATIENT)
Facility: CLINIC | Age: 61
Discharge: HOME | End: 2021-05-06
Attending: RADIOLOGY | Admitting: RADIOLOGY
Payer: MEDICARE

## 2021-05-06 ENCOUNTER — APPOINTMENT (OUTPATIENT)
Dept: INTERVENTIONAL RADIOLOGY/VASCULAR | Facility: CLINIC | Age: 61
End: 2021-05-06
Attending: NURSE PRACTITIONER
Payer: MEDICARE

## 2021-05-06 VITALS
HEART RATE: 74 BPM | RESPIRATION RATE: 18 BRPM | SYSTOLIC BLOOD PRESSURE: 139 MMHG | TEMPERATURE: 95.3 F | OXYGEN SATURATION: 97 % | DIASTOLIC BLOOD PRESSURE: 74 MMHG

## 2021-05-06 DIAGNOSIS — R13.10 DYSPHAGIA, UNSPECIFIED TYPE: ICD-10-CM

## 2021-05-06 PROCEDURE — 999N000154 HC STATISTIC RADIOLOGY XRAY, US, CT, MAR, NM

## 2021-05-06 PROCEDURE — C1769 GUIDE WIRE: HCPCS

## 2021-05-06 PROCEDURE — 272N000584 ZZ HC TUBE GASTRO CR13

## 2021-05-06 PROCEDURE — 49452 REPLACE G-J TUBE PERC: CPT

## 2021-05-06 NOTE — PROGRESS NOTES
Care Suites Admission Nursing Note    Patient Information  Name: Keyon Farias  Age: 60 year old  Reason for admission: *GJ tube exchange  Care Suites arrival time: 1340    Visitor Information  Name: Savannah  Informed of visitor restrictions: Yes  1 visitor allowed per patient   Visitor must screen negative for COVID symptoms   Visitor must wear a mask  Waiting rooms closed to visitors    Patient Admission/Assessment   Pre-procedure assessment complete: Yes  If abnormal assessment/labs, provider notified: N/A  NPO: Yes  Medications held per instructions/orders: N/A  Consent: obtained  If applicable, pregnancy test status: declined  Patient oriented to room: Yes  Education/questions answered: Yes  Plan/other: IR GJ tube exchange    Discharge Planning  Discharge name/phone number: Savannah  Overnight post sedation caregiver: n/a  Discharge location: home    Megan Nuno RN

## 2021-05-06 NOTE — PROCEDURES
[facility]    Procedure: 18 Fr GJ exchange    Date/Time: 5/6/2021 4:46 PM  Performed by: Brandon Villafana MD  Authorized by: Brandon Villafana MD     UNIVERSAL PROTOCOL   Site Marked: NA  Prior Images Obtained and Reviewed:  Yes  Required items: Required blood products, implants, devices and special equipment available    Patient identity confirmed:  Verbally with patient, arm band, provided demographic data and hospital-assigned identification number  Patient was reevaluated immediately before administering moderate or deep sedation or anesthesia  Confirmation Checklist:  Patient's identity using two indicators, relevant allergies, procedure was appropriate and matched the consent or emergent situation and correct equipment/implants were available  Time out: Immediately prior to the procedure a time out was called    Universal Protocol: the Joint Commission Universal Protocol was followed    Preparation: Patient was prepped and draped in usual sterile fashion           ANESTHESIA    Anesthesia: Local infiltration  Local Anesthetic:  Lidocaine 1% without epinephrine      SEDATION    Patient Sedated: No    See dictated procedure note for full details.  Findings: 18 Fr GJ exchange.  Ready for immediate use.    Specimens: none    Complications: None    Condition: Stable    PROCEDURE   Patient Tolerance:  Patient tolerated the procedure well with no immediate complications    Length of time physician/provider present for 1:1 monitoring during sedation: 0

## 2021-05-06 NOTE — DISCHARGE INSTRUCTIONS
GJ-tube Exchange Discharge Instructions     After you go home:      You may resume your normal diet    Care of Insertion Site:      For the first 48 hrs, check your puncture site every couple hours while you are awake     You may remove/change the dressing tomorrow    You may shower tomorrow    No tub baths, whirlpools or swimming until your puncture site has fully healed     Activity       You may go back to normal activity in 24 hours    Wait 48 hours before lifting, straining, exercise or other strenuous activity    Medicines:      You may resume all medications    Resume your Warfarin/Coumadin at your regular dose today. Follow up with your provider to have your INR rechecked    Resume your Platelet Inhibitors and Aspirin tomorrow at your regular dose    For minor pain, you may take Acetaminophen (Tylenol) or Ibuprofen (Advil)                 Call the provider who ordered this procedure if:      Blood or fluid is draining from the site    The site is red, swollen, hot, tender or there is foul-smelling drainage    Chills or a fever greater than 101 F (38 C)    Increased pain at the site    Any questions or concerns    Call  911 or go to the Emergency Room if:      Severe pain or trouble breathing    Bleeding that you cannot control      If you have questions call:          [facility] Radiology Dept @ [phone]        The provider who performed your procedure was _________________.        Caring for your G-tube    Tube Maintenance:    For ENFit Tubes:      Do NOT over tighten the connections (the purple end & hub connection).      Clean the connecting ends (especially the hub) every day.      If you are unable to disconnect the tubing ends, soak the connection in warm water (or wrap in a wet warm washcloth) to try and loosen the connection.    Possible problems with your tube may include:      Clogged with medications or feedings - most obstructions can be cleared with a small (3cc) syringe and warm  water. This may be repeated until the tube is unclogged. This can be prevented by frequently flushing the tube with water (60cc) during the day and always after medications & feedings.      Tube pulls out or falls out -cover the opening with gauze & tape. Call [phone] for further instructions      Skin breakdown and/or yeast infections at the insertion site - use of skin barrier ointments and anti-fungal powders can treat most site irritations.  Ask your pharmacist or provider for assistance (a prescription is not necessary).    In general, tube problems (including pulled tubes) are NOT emergency situations. Unless the pulling out of a tube is accompanied by uncontrollable bleeding, please DO NOT GO TO THE EMERGENCY ROOM!  Call [phone] with problems.    Tube Care:      Change the gauze dressing every 24 hours and if soiled (dirty).  Stabilize all tubes securely by using gauze and tape.  Clean tube site with soap & water using a cotton applicator (Q-tip) as needed to prevent irritation.    Flush feeding tube with 60cc of warm or room temperature water before and after meds.  To prevent the tube from clogging, ask your provider or pharmacist if liquid forms of your medications are available. If not, crush the pills well & be sure to flush the tube before & after all medications.    Flush feeding tube a minimum of every 4 hours and when feeding is completed with 60cc of water to keep the tube clear and avoid clogging.    Pt may use an abdominal (waist) binder to protect the G-tube.    If there is continued oozing or bleeding, redness, yellow/green/foul smelling drainage    STOP the feedings & use of the tube immediately if there is:      Continued oozing or bleeding at the site    Redness    Yellow/green or foul smelling drainage at the site    Uncontrolled stomach pain    Many of the supplies mentioned above can be purchased at your local pharmacy      For issues with your tube, please call:    [place]  Interventional Radiology Dept at [phone]

## 2021-05-06 NOTE — IR NOTE
Interventional Radiology Intra-procedural Nursing Note    Patient Name: Keyon Farias  Medical Record Number: [id number]  Today's Date: May 6, 2021    Start Time: 1446  End of procedure time: 1453  Procedure: G-J tube exchange  Report given to: Nidhi [facility] RN  Time pt departs:  1403    Other Notes: Pt into IR suite 1 via cart. Pt awake and alert. To table in supine position. Monitoring equipment applied. VSS. Tele SR. Dr. Villafana in room. Time out and procedure started. Pt tolerated procedure well. Debrief with Dr. Villafana.Dressing clean, dry and intact. No complications. Pt transferred back to [facility].    Thomas Baumann RN

## 2021-05-06 NOTE — PROGRESS NOTES
[facility] Discharge Nursing Note    Patient Information  Name: Keyon Farias  Age: 60 year old    Discharge Education:  Discharge instructions reviewed: Yes, with mother who is pt's caregiver  Additional education/resources provided: No  Patient/patient representative verbalizes understanding: Yes  Patient discharging on new medications: No  Medication education completed: N/A    Discharge Plans:   Discharge location: home  Discharge ride contacted: Yes  Approximate discharge time: 1530    Discharge Criteria:  Discharge criteria met and vital signs stable: Yes    Patient Belongs:  Patient belongings returned to patient: Yes    Nidhi Champion RN

## 2021-05-06 NOTE — PROGRESS NOTES
PATIENT/VISITOR WELLNESS SCREENING    Step 1 Patient Screening    1. In the last month, have you been in contact with someone who was confirmed or suspected to have Coronavirus/COVID-19? No    2. Do you have the following symptoms?  Fever/Chills? No   Cough? No   Shortness of breath? No   New loss of taste or smell? No  Sore throat? No  Muscle or body aches? No  Headaches? No  Fatigue? No  Vomiting or diarrhea? No    Step 2 Visitor Screening    1. Name of Visitor (1 visitor per patient): Savannah    2. In the last month, have you been in contact with someone who was confirmed or suspected to have Coronavirus/COVID-19? No    3. Do you have the following symptoms?  Fever/Chills? No   Cough? No   Shortness of breath? No   Skin rash? No   Loss of taste or smell? No  Sore throat? No  Runny or stuffy nose? No  Muscle or body aches? No  Headaches? No  Fatigue? No  Vomiting or diarrhea? No    If the visitor has positive symptoms, notify supervisor/manger  Per policy, the visitor will need to leave the facility     Step 3 Refer to logic grid below for actions    NO SYMPTOM(S)    ACTIONS:  1. Standard rooming process  2. Provider to assess per normal protocol  3. Implement precautions as needed and per guidelines     POSITIVE SYMPTOM(S)  If positive for ANY of the following symptoms: fever, cough, shortness of breath, rash    ACTION:  1. Continue to have the patient wear a mask   2. Room patient as soon as possible  3. Don appropriate PPE when entering room  4. Provider evaluation

## 2021-05-11 DIAGNOSIS — J69.0 ASPIRATION PNEUMONIA (H): ICD-10-CM

## 2021-05-11 NOTE — TELEPHONE ENCOUNTER
FV-scopolamine HBR 0.8mg   Last Written Prescription Date: 11-  Last Fill Quantity: 90, # refill: 3      Thanks,    Kwadwo Carter  Pharmacy Technician  [facility]   [phone]

## 2021-05-19 ENCOUNTER — TELEPHONE (OUTPATIENT)
Dept: ENDOCRINOLOGY | Facility: CLINIC | Age: 61
End: 2021-05-19

## 2021-05-19 NOTE — TELEPHONE ENCOUNTER
I spoke with Savannah and the Scopolamine is not prescribed by Endocrine. She didn't give it to him today and felt he is more alert . She will call Pulmonary as I can't advise if this is something that he should have or maybe a smaller dose.Stephany Redding RN on 5/19/2021 at 1:26 PM   .

## 2021-05-19 NOTE — TELEPHONE ENCOUNTER
[facility] Call Center    Phone Message    May a detailed message be left on voicemail: yes , Savannah is wanting to get a call back    Reason for Call: Medication Question or concern regarding medication   Prescription Clarification    Name of Medication:   * Scopolamine HBr POWD    Prescribing Provider: Vinay     Pharmacy: n/a     What on the order needs clarification?   * Per Patients Mother, Savannah states Patient has been really Groggy and that she thinks the medication was drugging Patient. Savannah states patient would be sleepy and did not give patient the medication anymore. Savannah states Patient is still sleeping a lot but seems a little better. Savannah states Patient is not running a temperature. Savannah is wanting to get a call back to know what to do. Savannah also stated that Dr. Mclean had also doubled patients medication due to very low Testerone.Savannah state she is able to wake up patient today, were as yesterday she was not able to wake up patient. Savannah states she is giving patient Pedialyte to help with electrolytes and she started that yesterday.        Action Taken: Message routed to:  [facility] endo    Travel Screening: Not Applicable

## 2021-05-23 ENCOUNTER — HOSPITAL ENCOUNTER (INPATIENT)
Facility: CLINIC | Age: 61
LOS: 2 days | Discharge: HOME HEALTH | DRG: 871 | End: 2021-05-25
Attending: EMERGENCY MEDICINE | Admitting: INTERNAL MEDICINE
Payer: MEDICARE

## 2021-05-23 ENCOUNTER — APPOINTMENT (OUTPATIENT)
Dept: GENERAL RADIOLOGY | Facility: CLINIC | Age: 61
DRG: 871 | End: 2021-05-23
Attending: EMERGENCY MEDICINE
Payer: MEDICARE

## 2021-05-23 DIAGNOSIS — J69.0 ASPIRATION PNEUMONITIS (H): ICD-10-CM

## 2021-05-23 DIAGNOSIS — R79.89 ELEVATED LACTIC ACID LEVEL: ICD-10-CM

## 2021-05-23 LAB
ALBUMIN SERPL-MCNC: 3.3 G/DL (ref 3.4–5)
ALBUMIN UR-MCNC: 50 MG/DL
ALP SERPL-CCNC: 101 U/L (ref 40–150)
ALT SERPL W P-5'-P-CCNC: 28 U/L (ref 0–70)
ANION GAP SERPL CALCULATED.3IONS-SCNC: 6 MMOL/L (ref 3–14)
APPEARANCE UR: CLEAR
AST SERPL W P-5'-P-CCNC: 40 U/L (ref 0–45)
BASE EXCESS BLDV CALC-SCNC: 1.8 MMOL/L
BASOPHILS # BLD AUTO: 0.1 10E9/L (ref 0–0.2)
BASOPHILS NFR BLD AUTO: 0.7 %
BILIRUB SERPL-MCNC: 0.3 MG/DL (ref 0.2–1.3)
BILIRUB UR QL STRIP: NEGATIVE
BUN SERPL-MCNC: 12 MG/DL (ref 7–30)
CALCIUM SERPL-MCNC: 8.7 MG/DL (ref 8.5–10.1)
CHLORIDE SERPL-SCNC: 94 MMOL/L (ref 94–109)
CO2 SERPL-SCNC: 28 MMOL/L (ref 20–32)
COLOR UR AUTO: YELLOW
CREAT SERPL-MCNC: 0.81 MG/DL (ref 0.66–1.25)
DIFFERENTIAL METHOD BLD: ABNORMAL
EOSINOPHIL # BLD AUTO: 0.4 10E9/L (ref 0–0.7)
EOSINOPHIL NFR BLD AUTO: 3.7 %
ERYTHROCYTE [DISTWIDTH] IN BLOOD BY AUTOMATED COUNT: 12.6 % (ref 10–15)
GFR SERPL CREATININE-BSD FRML MDRD: >90 ML/MIN/{1.73_M2}
GLUCOSE BLDC GLUCOMTR-MCNC: 161 MG/DL (ref 70–99)
GLUCOSE BLDC GLUCOMTR-MCNC: 166 MG/DL (ref 70–99)
GLUCOSE BLDC GLUCOMTR-MCNC: 195 MG/DL (ref 70–99)
GLUCOSE SERPL-MCNC: 64 MG/DL (ref 70–99)
GLUCOSE UR STRIP-MCNC: NEGATIVE MG/DL
HBA1C MFR BLD: 5.6 % (ref 0–5.6)
HCO3 BLDV-SCNC: 30 MMOL/L (ref 21–28)
HCT VFR BLD AUTO: 42.2 % (ref 40–53)
HGB BLD-MCNC: 13.6 G/DL (ref 13.3–17.7)
HGB UR QL STRIP: NEGATIVE
IMM GRANULOCYTES # BLD: 0 10E9/L (ref 0–0.4)
IMM GRANULOCYTES NFR BLD: 0.3 %
INTERPRETATION ECG - MUSE: NORMAL
KETONES UR STRIP-MCNC: NEGATIVE MG/DL
LABORATORY COMMENT REPORT: NORMAL
LACTATE BLD-SCNC: 2.1 MMOL/L (ref 0.7–2)
LACTATE BLD-SCNC: 3.2 MMOL/L (ref 0.7–2)
LACTATE BLD-SCNC: 4.1 MMOL/L (ref 0.7–2)
LACTATE BLD-SCNC: 4.6 MMOL/L (ref 0.7–2)
LEUKOCYTE ESTERASE UR QL STRIP: NEGATIVE
LYMPHOCYTES # BLD AUTO: 3.3 10E9/L (ref 0.8–5.3)
LYMPHOCYTES NFR BLD AUTO: 27.7 %
MCH RBC QN AUTO: 28.2 PG (ref 26.5–33)
MCHC RBC AUTO-ENTMCNC: 32.2 G/DL (ref 31.5–36.5)
MCV RBC AUTO: 88 FL (ref 78–100)
MONOCYTES # BLD AUTO: 0.5 10E9/L (ref 0–1.3)
MONOCYTES NFR BLD AUTO: 4.1 %
NEUTROPHILS # BLD AUTO: 7.6 10E9/L (ref 1.6–8.3)
NEUTROPHILS NFR BLD AUTO: 63.5 %
NITRATE UR QL: NEGATIVE
NRBC # BLD AUTO: 0 10*3/UL
NRBC BLD AUTO-RTO: 0 /100
O2/TOTAL GAS SETTING VFR VENT: ABNORMAL %
OXYHGB MFR BLDV: 17 %
PCO2 BLDV: 58 MM HG (ref 40–50)
PH BLDV: 7.32 PH (ref 7.32–7.43)
PH UR STRIP: 7 PH (ref 5–7)
PLATELET # BLD AUTO: 212 10E9/L (ref 150–450)
PO2 BLDV: 16 MM HG (ref 25–47)
POTASSIUM SERPL-SCNC: 3.8 MMOL/L (ref 3.4–5.3)
POTASSIUM SERPL-SCNC: 4.6 MMOL/L (ref 3.4–5.3)
PROT SERPL-MCNC: 7.8 G/DL (ref 6.8–8.8)
RBC # BLD AUTO: 4.82 10E12/L (ref 4.4–5.9)
RBC #/AREA URNS AUTO: 2 /HPF (ref 0–2)
SARS-COV-2 RNA RESP QL NAA+PROBE: NEGATIVE
SODIUM SERPL-SCNC: 128 MMOL/L (ref 133–144)
SOURCE: ABNORMAL
SP GR UR STRIP: 1.02 (ref 1–1.03)
SPECIMEN SOURCE: NORMAL
SQUAMOUS #/AREA URNS AUTO: 0 /HPF (ref 0–1)
UROBILINOGEN UR STRIP-MCNC: 2 MG/DL (ref 0–2)
WBC # BLD AUTO: 11.9 10E9/L (ref 4–11)
WBC #/AREA URNS AUTO: 3 /HPF (ref 0–5)

## 2021-05-23 PROCEDURE — 83605 ASSAY OF LACTIC ACID: CPT | Performed by: EMERGENCY MEDICINE

## 2021-05-23 PROCEDURE — 999N001017 HC STATISTIC GLUCOSE BY METER IP

## 2021-05-23 PROCEDURE — 36415 COLL VENOUS BLD VENIPUNCTURE: CPT | Performed by: INTERNAL MEDICINE

## 2021-05-23 PROCEDURE — 87635 SARS-COV-2 COVID-19 AMP PRB: CPT | Performed by: EMERGENCY MEDICINE

## 2021-05-23 PROCEDURE — 99223 1ST HOSP IP/OBS HIGH 75: CPT | Mod: AI | Performed by: INTERNAL MEDICINE

## 2021-05-23 PROCEDURE — 94640 AIRWAY INHALATION TREATMENT: CPT | Mod: 76

## 2021-05-23 PROCEDURE — 96361 HYDRATE IV INFUSION ADD-ON: CPT

## 2021-05-23 PROCEDURE — 999N000157 HC STATISTIC RCP TIME EA 10 MIN

## 2021-05-23 PROCEDURE — 94640 AIRWAY INHALATION TREATMENT: CPT

## 2021-05-23 PROCEDURE — 250N000013 HC RX MED GY IP 250 OP 250 PS 637: Performed by: INTERNAL MEDICINE

## 2021-05-23 PROCEDURE — 83605 ASSAY OF LACTIC ACID: CPT | Performed by: INTERNAL MEDICINE

## 2021-05-23 PROCEDURE — 84132 ASSAY OF SERUM POTASSIUM: CPT | Performed by: INTERNAL MEDICINE

## 2021-05-23 PROCEDURE — 250N000011 HC RX IP 250 OP 636: Performed by: EMERGENCY MEDICINE

## 2021-05-23 PROCEDURE — 258N000003 HC RX IP 258 OP 636: Performed by: EMERGENCY MEDICINE

## 2021-05-23 PROCEDURE — 258N000003 HC RX IP 258 OP 636: Performed by: HOSPITALIST

## 2021-05-23 PROCEDURE — 80161 ASY CARBAMAZEPIN 10,11-EPXID: CPT | Performed by: INTERNAL MEDICINE

## 2021-05-23 PROCEDURE — 96374 THER/PROPH/DIAG INJ IV PUSH: CPT

## 2021-05-23 PROCEDURE — 82805 BLOOD GASES W/O2 SATURATION: CPT | Performed by: EMERGENCY MEDICINE

## 2021-05-23 PROCEDURE — C9803 HOPD COVID-19 SPEC COLLECT: HCPCS

## 2021-05-23 PROCEDURE — 99207 PR NO CHARGE LOS: CPT | Performed by: HOSPITALIST

## 2021-05-23 PROCEDURE — 250N000013 HC RX MED GY IP 250 OP 250 PS 637: Performed by: EMERGENCY MEDICINE

## 2021-05-23 PROCEDURE — 250N000011 HC RX IP 250 OP 636: Performed by: INTERNAL MEDICINE

## 2021-05-23 PROCEDURE — 250N000012 HC RX MED GY IP 250 OP 636 PS 637: Performed by: INTERNAL MEDICINE

## 2021-05-23 PROCEDURE — 81001 URINALYSIS AUTO W/SCOPE: CPT | Performed by: EMERGENCY MEDICINE

## 2021-05-23 PROCEDURE — 250N000009 HC RX 250: Performed by: INTERNAL MEDICINE

## 2021-05-23 PROCEDURE — 83036 HEMOGLOBIN GLYCOSYLATED A1C: CPT | Performed by: EMERGENCY MEDICINE

## 2021-05-23 PROCEDURE — 258N000003 HC RX IP 258 OP 636: Performed by: INTERNAL MEDICINE

## 2021-05-23 PROCEDURE — 80157 ASSAY CARBAMAZEPINE FREE: CPT | Performed by: INTERNAL MEDICINE

## 2021-05-23 PROCEDURE — 120N000001 HC R&B MED SURG/OB

## 2021-05-23 PROCEDURE — 80053 COMPREHEN METABOLIC PANEL: CPT | Performed by: EMERGENCY MEDICINE

## 2021-05-23 PROCEDURE — 93005 ELECTROCARDIOGRAM TRACING: CPT

## 2021-05-23 PROCEDURE — 250N000013 HC RX MED GY IP 250 OP 250 PS 637: Performed by: HOSPITALIST

## 2021-05-23 PROCEDURE — 85025 COMPLETE CBC W/AUTO DIFF WBC: CPT | Performed by: EMERGENCY MEDICINE

## 2021-05-23 PROCEDURE — 99285 EMERGENCY DEPT VISIT HI MDM: CPT | Mod: 25

## 2021-05-23 PROCEDURE — 71046 X-RAY EXAM CHEST 2 VIEWS: CPT

## 2021-05-23 PROCEDURE — 87040 BLOOD CULTURE FOR BACTERIA: CPT | Performed by: EMERGENCY MEDICINE

## 2021-05-23 RX ORDER — ONDANSETRON 2 MG/ML
4 INJECTION INTRAMUSCULAR; INTRAVENOUS EVERY 6 HOURS PRN
Status: DISCONTINUED | OUTPATIENT
Start: 2021-05-23 | End: 2021-05-25 | Stop reason: HOSPADM

## 2021-05-23 RX ORDER — METOCLOPRAMIDE HYDROCHLORIDE 5 MG/5ML
10 SOLUTION ORAL
Status: DISCONTINUED | OUTPATIENT
Start: 2021-05-23 | End: 2021-05-25 | Stop reason: HOSPADM

## 2021-05-23 RX ORDER — AMOXICILLIN 250 MG
1 CAPSULE ORAL 2 TIMES DAILY PRN
Status: DISCONTINUED | OUTPATIENT
Start: 2021-05-23 | End: 2021-05-25 | Stop reason: HOSPADM

## 2021-05-23 RX ORDER — DEXTROSE MONOHYDRATE 25 G/50ML
25-50 INJECTION, SOLUTION INTRAVENOUS
Status: DISCONTINUED | OUTPATIENT
Start: 2021-05-23 | End: 2021-05-25 | Stop reason: HOSPADM

## 2021-05-23 RX ORDER — LEVOTHYROXINE SODIUM 150 UG/1
150 TABLET ORAL EVERY MORNING
Status: DISCONTINUED | OUTPATIENT
Start: 2021-05-23 | End: 2021-05-25 | Stop reason: HOSPADM

## 2021-05-23 RX ORDER — POLYETHYLENE GLYCOL 3350 17 G/17G
17 POWDER, FOR SOLUTION ORAL DAILY PRN
Status: DISCONTINUED | OUTPATIENT
Start: 2021-05-23 | End: 2021-05-25 | Stop reason: HOSPADM

## 2021-05-23 RX ORDER — AMPICILLIN AND SULBACTAM 2; 1 G/1; G/1
3 INJECTION, POWDER, FOR SOLUTION INTRAMUSCULAR; INTRAVENOUS ONCE
Status: COMPLETED | OUTPATIENT
Start: 2021-05-23 | End: 2021-05-23

## 2021-05-23 RX ORDER — CARBAMAZEPINE 100 MG/5ML
150 SUSPENSION ORAL 3 TIMES DAILY
Status: DISCONTINUED | OUTPATIENT
Start: 2021-05-23 | End: 2021-05-25 | Stop reason: HOSPADM

## 2021-05-23 RX ORDER — ACETAMINOPHEN 650 MG/1
650 SUPPOSITORY RECTAL EVERY 4 HOURS PRN
Status: DISCONTINUED | OUTPATIENT
Start: 2021-05-23 | End: 2021-05-25 | Stop reason: HOSPADM

## 2021-05-23 RX ORDER — CARBAMAZEPINE 100 MG/5ML
100 SUSPENSION ORAL DAILY
Status: DISCONTINUED | OUTPATIENT
Start: 2021-05-23 | End: 2021-05-25 | Stop reason: HOSPADM

## 2021-05-23 RX ORDER — LIDOCAINE 40 MG/G
CREAM TOPICAL
Status: DISCONTINUED | OUTPATIENT
Start: 2021-05-23 | End: 2021-05-25 | Stop reason: HOSPADM

## 2021-05-23 RX ORDER — AMPICILLIN AND SULBACTAM 2; 1 G/1; G/1
3 INJECTION, POWDER, FOR SOLUTION INTRAMUSCULAR; INTRAVENOUS ONCE
Status: DISCONTINUED | OUTPATIENT
Start: 2021-05-23 | End: 2021-05-23

## 2021-05-23 RX ORDER — NICOTINE POLACRILEX 4 MG
15-30 LOZENGE BUCCAL
Status: DISCONTINUED | OUTPATIENT
Start: 2021-05-23 | End: 2021-05-25 | Stop reason: HOSPADM

## 2021-05-23 RX ORDER — ACETAMINOPHEN 325 MG/1
650 TABLET ORAL EVERY 4 HOURS PRN
Status: DISCONTINUED | OUTPATIENT
Start: 2021-05-23 | End: 2021-05-25 | Stop reason: HOSPADM

## 2021-05-23 RX ORDER — METOCLOPRAMIDE HYDROCHLORIDE 5 MG/5ML
10 SOLUTION ORAL
Status: DISCONTINUED | OUTPATIENT
Start: 2021-05-23 | End: 2021-05-23

## 2021-05-23 RX ORDER — ACETAMINOPHEN 650 MG/1
650 SUPPOSITORY RECTAL ONCE
Status: COMPLETED | OUTPATIENT
Start: 2021-05-23 | End: 2021-05-23

## 2021-05-23 RX ORDER — SODIUM BICARBONATE 650 MG/1
650 TABLET ORAL DAILY
Status: DISCONTINUED | OUTPATIENT
Start: 2021-05-23 | End: 2021-05-25 | Stop reason: HOSPADM

## 2021-05-23 RX ORDER — DEXTROSE MONOHYDRATE 100 MG/ML
INJECTION, SOLUTION INTRAVENOUS CONTINUOUS PRN
Status: DISCONTINUED | OUTPATIENT
Start: 2021-05-23 | End: 2021-05-25 | Stop reason: HOSPADM

## 2021-05-23 RX ORDER — AMOXICILLIN 250 MG
2 CAPSULE ORAL 2 TIMES DAILY PRN
Status: DISCONTINUED | OUTPATIENT
Start: 2021-05-23 | End: 2021-05-25 | Stop reason: HOSPADM

## 2021-05-23 RX ORDER — SODIUM CHLORIDE 9 MG/ML
INJECTION, SOLUTION INTRAVENOUS CONTINUOUS
Status: DISCONTINUED | OUTPATIENT
Start: 2021-05-23 | End: 2021-05-25 | Stop reason: HOSPADM

## 2021-05-23 RX ORDER — AMPICILLIN AND SULBACTAM 2; 1 G/1; G/1
3 INJECTION, POWDER, FOR SOLUTION INTRAMUSCULAR; INTRAVENOUS EVERY 6 HOURS
Status: DISCONTINUED | OUTPATIENT
Start: 2021-05-23 | End: 2021-05-25 | Stop reason: HOSPADM

## 2021-05-23 RX ORDER — HYDROCORTISONE 5 MG/1
5 TABLET ORAL DAILY
Status: CANCELLED | OUTPATIENT
Start: 2021-05-23

## 2021-05-23 RX ORDER — GUAR GUM
1 PACKET (EA) ORAL DAILY
Status: DISCONTINUED | OUTPATIENT
Start: 2021-05-23 | End: 2021-05-25 | Stop reason: HOSPADM

## 2021-05-23 RX ORDER — ALBUTEROL SULFATE 0.83 MG/ML
2.5 SOLUTION RESPIRATORY (INHALATION)
Status: DISCONTINUED | OUTPATIENT
Start: 2021-05-23 | End: 2021-05-25 | Stop reason: HOSPADM

## 2021-05-23 RX ORDER — ASPIRIN 81 MG/1
81 TABLET, CHEWABLE ORAL DAILY
Status: DISCONTINUED | OUTPATIENT
Start: 2021-05-23 | End: 2021-05-25 | Stop reason: HOSPADM

## 2021-05-23 RX ORDER — ONDANSETRON 4 MG/1
4 TABLET, ORALLY DISINTEGRATING ORAL EVERY 6 HOURS PRN
Status: DISCONTINUED | OUTPATIENT
Start: 2021-05-23 | End: 2021-05-25 | Stop reason: HOSPADM

## 2021-05-23 RX ORDER — ACETYLCYSTEINE 200 MG/ML
2 SOLUTION ORAL; RESPIRATORY (INHALATION) 4 TIMES DAILY
Status: DISCONTINUED | OUTPATIENT
Start: 2021-05-23 | End: 2021-05-25 | Stop reason: HOSPADM

## 2021-05-23 RX ADMIN — ACETYLCYSTEINE 2 ML: 200 SOLUTION ORAL; RESPIRATORY (INHALATION) at 12:32

## 2021-05-23 RX ADMIN — LEVOTHYROXINE SODIUM 150 MCG: 150 TABLET ORAL at 11:22

## 2021-05-23 RX ADMIN — CARBAMAZEPINE 100 MG: 100 SUSPENSION ORAL at 19:43

## 2021-05-23 RX ADMIN — ACETYLCYSTEINE 2 ML: 200 SOLUTION ORAL; RESPIRATORY (INHALATION) at 19:43

## 2021-05-23 RX ADMIN — HYDROCORTISONE 15 MG: 10 TABLET ORAL at 19:43

## 2021-05-23 RX ADMIN — SODIUM CHLORIDE: 9 INJECTION, SOLUTION INTRAVENOUS at 22:59

## 2021-05-23 RX ADMIN — METOCLOPRAMIDE HYDROCHLORIDE 10 MG: 5 SOLUTION ORAL at 13:26

## 2021-05-23 RX ADMIN — ALBUTEROL SULFATE 2.5 MG: 2.5 SOLUTION RESPIRATORY (INHALATION) at 12:32

## 2021-05-23 RX ADMIN — Medication 1 PACKET: at 11:30

## 2021-05-23 RX ADMIN — SODIUM CHLORIDE, POTASSIUM CHLORIDE, SODIUM LACTATE AND CALCIUM CHLORIDE 1000 ML: 600; 310; 30; 20 INJECTION, SOLUTION INTRAVENOUS at 04:42

## 2021-05-23 RX ADMIN — PANTOPRAZOLE SODIUM 40 MG: 40 TABLET, DELAYED RELEASE ORAL at 11:33

## 2021-05-23 RX ADMIN — AMPICILLIN SODIUM AND SULBACTAM SODIUM 3 G: 2; 1 INJECTION, POWDER, FOR SOLUTION INTRAMUSCULAR; INTRAVENOUS at 06:04

## 2021-05-23 RX ADMIN — ALBUTEROL SULFATE 2.5 MG: 2.5 SOLUTION RESPIRATORY (INHALATION) at 15:59

## 2021-05-23 RX ADMIN — CARBAMAZEPINE 150 MG: 100 SUSPENSION ORAL at 11:34

## 2021-05-23 RX ADMIN — DEXTROSE AND SODIUM CHLORIDE: 5; 900 INJECTION, SOLUTION INTRAVENOUS at 09:28

## 2021-05-23 RX ADMIN — SODIUM CHLORIDE: 9 INJECTION, SOLUTION INTRAVENOUS at 13:17

## 2021-05-23 RX ADMIN — ALBUTEROL SULFATE 2.5 MG: 2.5 SOLUTION RESPIRATORY (INHALATION) at 19:43

## 2021-05-23 RX ADMIN — AMPICILLIN SODIUM AND SULBACTAM SODIUM 3 G: 2; 1 INJECTION, POWDER, FOR SOLUTION INTRAMUSCULAR; INTRAVENOUS at 13:22

## 2021-05-23 RX ADMIN — BRIVARACETAM 100 MG: 10 SOLUTION ORAL at 21:31

## 2021-05-23 RX ADMIN — ACETAMINOPHEN 650 MG: 650 SUPPOSITORY RECTAL at 03:36

## 2021-05-23 RX ADMIN — BRIVARACETAM 100 MG: 10 SOLUTION ORAL at 11:34

## 2021-05-23 RX ADMIN — HYDROCORTISONE 15 MG: 10 TABLET ORAL at 11:22

## 2021-05-23 RX ADMIN — SODIUM CHLORIDE 1000 ML: 9 INJECTION, SOLUTION INTRAVENOUS at 03:18

## 2021-05-23 RX ADMIN — POTASSIUM & SODIUM PHOSPHATES POWDER PACK 280-160-250 MG 1 PACKET: 280-160-250 PACK at 23:09

## 2021-05-23 RX ADMIN — POTASSIUM & SODIUM PHOSPHATES POWDER PACK 280-160-250 MG 1 PACKET: 280-160-250 PACK at 16:51

## 2021-05-23 RX ADMIN — SODIUM BICARBONATE 650 MG TABLET 650 MG: at 15:20

## 2021-05-23 RX ADMIN — INSULIN ASPART 1 UNITS: 100 INJECTION, SOLUTION INTRAVENOUS; SUBCUTANEOUS at 20:49

## 2021-05-23 RX ADMIN — ASPIRIN 81 MG CHEWABLE TABLET 81 MG: 81 TABLET CHEWABLE at 13:26

## 2021-05-23 RX ADMIN — MINERAL SUPPLEMENT IRON 300 MG / 5 ML STRENGTH LIQUID 100 PER BOX UNFLAVORED 220 MG: at 11:32

## 2021-05-23 RX ADMIN — SODIUM CHLORIDE, POTASSIUM CHLORIDE, SODIUM LACTATE AND CALCIUM CHLORIDE 1000 ML: 600; 310; 30; 20 INJECTION, SOLUTION INTRAVENOUS at 05:49

## 2021-05-23 RX ADMIN — METOCLOPRAMIDE HYDROCHLORIDE 10 MG: 5 SOLUTION ORAL at 23:02

## 2021-05-23 RX ADMIN — METOCLOPRAMIDE HYDROCHLORIDE 10 MG: 5 SOLUTION ORAL at 16:51

## 2021-05-23 RX ADMIN — AMPICILLIN SODIUM AND SULBACTAM SODIUM 3 G: 2; 1 INJECTION, POWDER, FOR SOLUTION INTRAMUSCULAR; INTRAVENOUS at 19:26

## 2021-05-23 RX ADMIN — ACETYLCYSTEINE 2 ML: 200 SOLUTION ORAL; RESPIRATORY (INHALATION) at 16:00

## 2021-05-23 RX ADMIN — HYDROCORTISONE 15 MG: 10 TABLET ORAL at 15:20

## 2021-05-23 ASSESSMENT — ACTIVITIES OF DAILY LIVING (ADL)
ADLS_ACUITY_SCORE: 35
ADLS_ACUITY_SCORE: 38
ADLS_ACUITY_SCORE: 39

## 2021-05-23 NOTE — ED NOTES
DATE:  5/23/2021   TIME OF RECEIPT FROM LAB:  0522  LAB TEST:  lactic  LAB VALUE:  4.1  RESULTS GIVEN WITH READ-BACK TO (PROVIDER):  Keyon Ying MD  TIME LAB VALUE REPORTED TO PROVIDER:   0506

## 2021-05-23 NOTE — PLAN OF CARE
DATE & TIME: 5/23/21 6897-2109   Cognitive Concerns/ Orientation : Nonverbal   BEHAVIOR & AGGRESSION TOOL COLOR: Green  CIWA SCORE: NA   ABNL VS/O2: VSS on 2L NC  MOBILITY: Strict bedrest, total care  PAIN MANAGMENT: denies  DIET: NPO, GJ tube - cont TF @ 30ml/hr with 60ml FWF q4hr. GR 55ml/hr  BOWEL/BLADDER: male external catheter in place, fecal incontinence.   ABNL LAB/BG: Na 128, , Lactic Acid 2.1  DRAIN/DEVICES: GJ tube, L forearm PIV infusing NS @ 100/hr, male external catheter.  TELEMETRY RHYTHM: NA  SKIN: blanchable redness to BL buttocks   TESTS/PROCEDURES: tbd  D/C DAY/GOALS/PLACE: pend pt progress  OTHER IMPORTANT INFO: productive cough - suctioning, LS diminished.

## 2021-05-23 NOTE — CONSULTS
"CLINICAL NUTRITION SERVICES  -  ASSESSMENT NOTE    Recommendations Ordered by Registered Dietitian (RD):   Type of Feeding Tube: GJ tube (feed into J-port)  Enteral Frequency:  Continuous  Enteral Regimen: Jevity 1.5 @ 55 mL/hr (substitute for Jevity 1.5)  Total Enteral Provisions: 1320ml/day will provide: 1980 kcals (26 kcal/kg), 84 g PRO (1.1 g/kg), 1003 ml free H20, 284 g CHO, and 27 g fiber daily.  Add 1 pkt Nutrisource Fiber daily (15 kcal, 4 g fiber)    Free Water Flush: 60 mL every 4 hrs while IVF running  Begin @ 30 ml/hr. Advance to 55 ml/hr after 4 hours.   Malnutrition:   % Weight Loss: Unable to evaluate  % Intake:  Decreased intake does not meet criteria for malnutrition - EN reliant  Subcutaneous Fat Loss:  Deferred - SPENCER  Muscle Loss:  Deferred - SPENCER  Fluid Retention:  None noted    Malnutrition Diagnosis: Unable to determine due to lack of information. Do no suspect in the setting of EN reliance.      REASON FOR ASSESSMENT  Keyon Farias is a 60 year old male seen by Registered Dietitian for Provider Order - tube feeds and Provider Order - Registered Dietitian to Assess and Order TF per Medical Nutrition Therapy Protocol    NUTRITION HISTORY  - Information obtained from chart review (still in ED)  - Familiar with patient from previous admissions.   - 5/6/21: GJ tube exchange     - Receives Jevity 1.5 @ 55 ml/hr x 24 hours (5.5 cans/day) + 1 Scoop Benefiber daily. Provides 1967 kcal, 83 gm protein and 990 mL free water.   - Also receives NeutraPhos TID  - He gets 1 L free water over the course of the day.     CURRENT NUTRITION ORDERS  Diet Order:     NPO - baseline    Current Intake/Tolerance:  N/A    NUTRITION FOCUSED PHYSICAL ASSESSMENT FOR DIAGNOSING MALNUTRITION)  No:  Pt is off the floor    Obtained from Chart/Interdisciplinary Team:  Admitted for aspiration pneumonia  Mother, Savannah, reported that pt has been getting weaker over time    ANTHROPOMETRICS  Height: 5' 11\"  Weight: 76 kg (167 lb 8.8 " oz)   BMI: 23.4 kg/m2  Weight Status:  Normal BMI  IBW: 78.2 kg   % IBW: 97%  Weight History: No new wt on file to evaluate.     Wt Readings from Last 10 Encounters:   04/16/21 76 kg (167 lb 8.8 oz)   02/22/21 74.8 kg (165 lb)   01/18/21 74.2 kg (163 lb 9.3 oz)   12/20/20 77.2 kg (170 lb 1.6 oz)   11/04/20 71 kg (156 lb 8.4 oz)   10/25/20 68.9 kg (151 lb 12.8 oz)   10/23/20 72.6 kg (160 lb)   10/14/20 73.5 kg (162 lb)   09/25/20 73.7 kg (162 lb 6.4 oz)   08/22/20 69.9 kg (154 lb 3.2 oz)       LABS  Labs reviewed  Na 128 (L)  BG 64 (L)    MEDICATIONS  Medications reviewed  D5 + NaCl IVF @ 100 mL/hr --> 408 kcal daily from dextrose     ASSESSED NUTRITION NEEDS PER APPROVED PRACTICE GUIDELINES:  Dosing Weight 76 kg   Estimated Energy Needs: 3777-7906 kcals (25-30 Kcal/Kg)  Justification: maintenance  Estimated Protein Needs:  grams protein (1.2-1.5 g pro/Kg)  Justification: maintenance   Estimated Fluid Needs: 1 mL/kcal  Justification: maintenance    MALNUTRITION:  % Weight Loss: Unable to evaluate  % Intake:  Decreased intake does not meet criteria for malnutrition - EN reliant  Subcutaneous Fat Loss:  Deferred - SPENCER  Muscle Loss:  Deferred - SPENCER  Fluid Retention:  None noted    Malnutrition Diagnosis: Unable to determine due to lack of information. Do no suspect in the setting of EN reliance.     NUTRITION DIAGNOSIS:  No nutrition diagnosis identified at this time      NUTRITION INTERVENTIONS  Recommendations / Nutrition Prescription  Start EN per home regimen -     Type of Feeding Tube: GJ tube (feed into J-port)  Enteral Frequency:  Continuous  Enteral Regimen: Jevity 1.5 @ 55 mL/hr (substitute for Jevity 1.5)  Total Enteral Provisions: 1320ml/day will provide: 1980 kcals (26 kcal/kg), 84 g PRO (1.1 g/kg), 1003 ml free H20, 284 g CHO, and 27 g fiber daily.  Add 1 pkt Nutrisource Fiber daily (15 kcal, 4 g fiber)    Free Water Flush: 60 mL every 4 hrs while IVF running  Begin @ 30 ml/hr. Advance to 55 ml/hr  after 4 hours.       Implementation  Nutrition education: Per Provider order if indicated   EN Composition, EN Schedule and Feeding Tube Flush: ordered in Epic    Nutrition Goals  TF @ goal to provide % estimated needs.       MONITORING AND EVALUATION:  Progress towards goals will be monitored and evaluated per protocol and Practice Guidelines    Marisel Fernández RD, LD  [facility] [place], 68, 57, [facility]   Pager: [phone]  Weekend Pager: [phone]

## 2021-05-23 NOTE — PROGRESS NOTES
Brief, no charge note. Patient admitted by my colleague Dr. Maldonado earlier this morning.    Mr. Farias is well known to our hospitalist service with frequent readmissions for aspiration pneumonitis.    - Afebrile on admission; WBC 11.9; lactate 4.6---->3.2---2.1; normal UA; blood cultures from 5/23 pending  -COVID negative; chest x-ray noted with moderate-sized region of hazy opacities in the mid and lower right lung, most likely representing pneumonia  - Na 128, Glu 64 (suspected due to interruption in tube feeding)    -Will continue empiric Unasyn; got couple liters of fluid boluses in the ED; continue IV hydration but will switch to NS given low sodium of 128  - tube feeds initiated; hypoglycemia protocol in place  - Mr. Farias typically improves quickly with couple doses of antibiotics  -monitor clinically, follow cultures    Rest care plan per H&P by Dr. Maldonado    Updated his mother Savannah who is the legal guardian

## 2021-05-23 NOTE — ED NOTES
[facility]  ED Nurse Handoff Report    ED Chief complaint: Shortness of Breath      ED Diagnosis:   Final diagnoses:   None       Code Status: Full Code    Allergies:   Allergies   Allergen Reactions     Valproic Acid Other (See Comments)     Toxicity w/ bone marrow suspension, elevated ammonia levels      Dilantin [Phenytoin Sodium] Other (See Comments)     Severe Trembling     Scopolamine Hives     Hives with the patch - oral no problem       Patient Story: Pt lives at home with his mother. Pt nonverbal. Pt has increased shortness of breath and cough. Possible aspiration. Pt has g-tube.   Focused Assessment:  Shortness of breath, cough. fever    Treatments and/or interventions provided: see MAR , O2 2liters  Patient's response to treatments and/or interventions:     To be done/followed up on inpatient unit:      Does this patient have any cognitive concerns?: pt at baseline    Activity level - Baseline/Home:  Total Care  Activity Level - Current:   Total Care    Patient's Preferred language: English   Needed?: No    Isolation: None and Contact   Infection: Not Applicable  MRSA  Patient tested for COVID 19 prior to admission: YES  Bariatric?: No    Vital Signs:   Vitals:    05/23/21 0443 05/23/21 0500 05/23/21 0508 05/23/21 0519   BP: 109/54 103/60  111/55   Pulse: 101 97 96 101   Resp: (!) 33 (!) 33 30 (!) 32   Temp:       TempSrc:       SpO2: 91% 97% 98% 98%       Cardiac Rhythm:     Was the PSS-3 completed:   Yes  What interventions are required if any?               Family Comments:   OBS brochure/video discussed/provided to patient/family: Yes              Name of person given brochure if not patient:               Relationship to patient:     For the majority of the shift this patient's behavior was Green.   Behavioral interventions performed were .    ED NURSE PHONE NUMBER: [phone]
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
## 2021-05-23 NOTE — ED PROVIDER NOTES
"  History     Chief Complaint:  Shortness of Breath      HPI   History is limited due to nonverbal status. History is supplemented by the mother.    Keyon Farias is a 60 year old male with a history of recurrent aspiration pneumonia who presents to the emergency department for evaluation of shortness of breath. Mother states that patient began having cough, shortness of breath, and vomiting tonight with possible aspiration. She notes that his breathing sounds \"rattly\" and has been less interactive since. Patient was given Mucinex prior to arrival. Patient has been on scopolamine but has not taken this the past two days as it makes him drowsy. Mom also notes the patient has been getting weaker over time. No fever.     Review of Systems   Unable to perform ROS: Patient nonverbal     Allergies:  Valproic Acid  Dilantin [Phenytoin Sodium]  Scopolamine     Medications:  Albuterol inhaler  Aspirin 81 mg g  Briviact  Tegretol  Hydrocortisone sodium succinate   Protonix  Synthroid   Depotestosterone   Scopolamine      Past Medical History:    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  DVT  GERD  Panhypopituitarism  Pneumonia  Seizures  Septic shock  Spastic hemiplegia   Thyroid disease   CVA  UTI  Ventricular fibrillation  Ventricular tachyarrhythmia   SIRS  Appendicitis    Pancreatitis      Past Surgical History:    Gastro jejunostomy tube change x10   Laparoscopic appendectomy  Laparoscopic insertion gastrostomy   Tracheostomy   Right hand repair   Vascular surgery       Family History:    Cancer    Social History:  The patient presents to the emergency department with mother.    Physical Exam     Patient Vitals for the past 24 hrs:   BP Temp Temp src Pulse Resp SpO2   05/23/21 0640 99/70 -- -- 99 (!) 42 93 %   05/23/21 0617 116/59 -- -- 103 (!) 31 96 %   05/23/21 0519 111/55 -- -- 101 (!) 32 98 %   05/23/21 0508 -- -- -- 96 30 98 %   05/23/21 0500 103/60 -- -- 97 (!) 33 97 %   05/23/21 0443 109/54 -- -- 101 (!) 33 91 %   05/23/21 0400 " 123/61 -- -- 101 (!) 37 100 %   05/23/21 0351 -- -- -- 102 (!) 40 100 %   05/23/21 0315 111/67 -- -- 107 (!) 39 98 %   05/23/21 0301 111/62 -- -- 108 (!) 44 99 %   05/23/21 0300 111/62 -- -- 107 (!) 36 99 %   05/23/21 0237 (!) 122/108 99.6  F (37.6  C) Oral -- (!) 36 --   05/23/21 0234 -- -- -- 116 (!) 36 93 %         Physical Exam  Head: No signs of trauma.   Mouth/Throat: Oropharynx is clear and moist.   CV: Mild tachycardia and regular rhythm.    Resp: Coarse upper airway sounds. No respiratory distress.   GI: Soft. There is no tenderness.  No rebound or guarding.  Normal bowel sounds.  J-tube present  MSK: No bony deformities noted.  Skin: Skin is warm and dry. No rash noted.       Emergency Department Course   ECG  ECG taken at 241, ECG read at 310  Sinus tachycardia. Left anterior fascicular block. Abnormal ECG.  Rate 114 bpm. FL interval 114 ms. QRS duration 80 ms. QT/QTc 336/463 ms. P-R-T axes 51 -65 55.     Imaging:  XR Chest PA and LAT:   A moderate-sized region of hazy opacities is present in the mid and lower right lung, most likely representing pneumonia.  as per radiology.    Laboratory:  CBC: WBC: 11.9 (H), HGB: 13.6, PLT: 212  CMP: Glucose 64 (L), Sodium: 128 (L), Albumin: 3.3 (L), o/w WNL (Creatinine: 0.81)  UA: Protein Albumin: 50, o/w Negative  Lactic acid (Resulted 310): 4.6 (HH)  Repeat Lactic acid (Resulted 532): 4.0 (HH)  Repeat Lactic acid (Resulted 637): 3.2 (H)  VBG and oxyhgb: pH 7.32 / PCO2 58 (H) / PO2 16 (L) / Bicarb 30 (H) / FlO2 2L / Oxyhemoglobin 17 / Base excess 1.8  Blood Cultures x2: Pending  Symptomatic COVID-19 PCR: Negative    Emergency Department Course:  Reviewed:  I reviewed the patient's nursing notes, vitals, past medical records, Care Everywhere.     Assessments:  327 I assessed the patient. Exam findings described above.    540 I reassessed and updated the patient and mother,    Consults:   544 I spoke with Dr. Maldonado of the hospitalist services, who is in agreement to  accept the patient for admission for further monitoring, evaluation, and treatment. Findings and plan explained to the mother who consents to admission.     Interventions:  318 NS 1000 mL IV  336 Tylenol 650 mg Rectal  442 Lactated ringers 1000 mL IV  540 Unasyn 3 g IV    Disposition:  Admitted to the hospital.    Impression & Plan      CMS Diagnoses: The Lactic acid level is elevated due to aspiration pneumonia, at this time there is no sign of severe sepsis or septic shock.     Medical Decision Making:  Keyon Farias 60-year-old gentleman who presents due to some difficulty breathing.  Patient is nonverbal and has a history of recurrent aspiration pneumonia.  Mother reports that he had been doing well until this evening when she noticed that he was having rattling sounds and seemed somewhat less interactive. On my evaluation the patient did appear to have some mucus in the upper airway.  Blood work was obtained that did show slight amount of elevation of his white blood cell count along with an elevation of his lactic acid.  Initially I tried to hold off on IV antibiotics as symptoms oftentimes resolve fairly quickly without the need for antibiotics and the mother wanted to limit his exposure to these.  He was given IV fluids and given his lactic acid continued to be somewhat elevated and chest x-ray did show signs of infiltrate I discussed with the mother and antibiotics were initiated.  Follow-up lactic acid did improve to less than 4.  Patient was admitted for continued monitoring and further treatment.    Covid-19  Keyon Farias was evaluated during a global COVID-19 pandemic, which necessitated consideration that the patient might be at risk for infection with the SARS-CoV-2 virus that causes COVID-19.   Applicable protocols for evaluation were followed during the patient's care.   COVID-19 was considered as part of the patient's evaluation. The plan for testing is:  a test was obtained during this  visit.    Diagnosis:    ICD-10-CM    1. Aspiration pneumonitis (H)  J69.0 Lactic acid   2. Elevated lactic acid level  R79.89        Jeferson Joyner  5/23/2021   EMERGENCY DEPARTMENT  Scribe Disclosure:  I, Jeferson Joyner, am serving as a scribe at 3:27 AM on 5/23/2021 to document services personally performed by Keyon Ying MD based on my observations and the provider's statements to me.          Keyon Ying MD  05/23/21 [id number]

## 2021-05-23 NOTE — PROGRESS NOTES
RECEIVING UNIT ED HANDOFF REVIEW    ED Nurse Handoff Report was reviewed by: Keeley Thompson RN on May 23, 2021 at 8:54 AM

## 2021-05-23 NOTE — ED TRIAGE NOTES
Pt lives at home with his mother. Pt nonverbal. Pt has increased shortness of breath and cough. Possible aspiration

## 2021-05-23 NOTE — ED NOTES
Bed: ED04  Expected date: 5/23/21  Expected time: 2:28 AM  Means of arrival: Ambulance  Comments:  Kerri JONES 60M poss. Aspiration

## 2021-05-23 NOTE — ED NOTES
DATE:  5/23/2021   TIME OF RECEIPT FROM LAB:  0310  LAB TEST:  Lactic   LAB VALUE:  4.6  RESULTS GIVEN WITH READ-BACK TO (PROVIDER):  Dr Dawson  TIME LAB VALUE REPORTED TO PROVIDER:   0319

## 2021-05-23 NOTE — PHARMACY-ADMISSION MEDICATION HISTORY
Pharmacy Medication History  Admission medication history interview status for the 5/23/2021  admission is complete. See EPIC admission navigator for prior to admission medications     Location of Interview: Phone  Medication history sources: Patient's family/friend (Savannah, mother), Surescripts, Care Everywhere and [facility] discharge summary    Significant changes made to the medication list:  Removed: Polytrim eye drops (old script)    In the past week, patient estimated taking medication this percent of the time: greater than 90%    Additional medication history information:   None    Medication reconciliation completed by provider prior to medication history? No    Time spent in this activity: 10 mins    Prior to Admission medications    Medication Sig Last Dose Taking? Auth Provider   acetaminophen (TYLENOL 8 HOUR) 650 MG CR tablet Take 650 mg by mouth every 8 hours as needed for mild pain or fever  Yes Unknown, Entered By History   acetylcysteine (MUCOMYST) 20 % neb solution Take 2 mLs by nebulization 4 times daily With albuterol at 0700, 1100, 1500, and 1900  5/22/2021 at 1900 Yes Unknown, Entered By History   albuterol (PROVENTIL) (5 MG/ML) 0.5% neb solution Take 2.5 mg by nebulization every 4 hours (while awake) 0700 1100 1500 1900 with mucomyst  5/22/2021 at 1900 Yes Unknown, Entered By History   aspirin (ASA) 81 MG chewable tablet 81 mg by Oral or Feeding Tube route daily At 0900 5/22/2021 at Unknown time Yes Unknown, Entered By History   bacitracin ointment Apply topically daily as needed for wound care To PEG site.   Yes Unknown, Entered By History   Brivaracetam (BRIVIACT) 10 MG/ML solution 100 mg by Oral or Feeding Tube route 2 times daily 0900, 2100 5/22/2021 at 2100 Yes Unknown, Entered By History   calcium carbonate 1250 MG/5ML SUSP suspension Take 1,250 mg by mouth 3 times daily 0900, 1500, 2100 5/22/2021 at 2100 Yes Unknown, Entered By History   carBAMazepine (TEGRETOL) 100 MG/5ML suspension Take 100  mg by mouth daily Take at 1800  5/22/2021 at 1800 Yes Unknown, Entered By History   carBAMazepine (TEGRETOL) 100 MG/5ML suspension 150 mg by Oral or Feeding Tube route 3 times daily At 06:00, 12:00, and 24:00 for seizures 5/23/2021 at 0000 Yes Unknown, Entered By History   clotrimazole-betamethasone (LOTRISONE) 1-0.05 % external cream Apply topically 2 times daily as needed   Yes Leticia Santiago MD   ferrous sulfate 220 (44 Fe) MG/5ML ELIX 220 mg by Per Feeding Tube route daily @ 0900 5/22/2021 at 0900 Yes Unknown, Entered By History   Guar Gum (FIBER MODULAR, NUTRISOURCE FIBER,) packet 1 packet by Per Feeding Tube route daily 5/22/2021 at Unknown time Yes Unknown, Entered By History   hydrocortisone (CORTEF) 5 MG tablet 3 tablets (15 mg) by Oral or Feeding Tube route every morning And 1 tablet (5 mg) at 2:00 PM. During illness please increase the dose as directed. 5/22/2021 at Unknown time Yes Faizan Mclean MD   hydrocortisone 1 % CREA cream Place rectally 2 times daily as needed for other Apply to reddened memo areas as needed  Yes Unknown, Entered By History   hydrocortisone sodium succinate PF (SOLU-CORTEF) 100 MG injection Inject 1 mL (50 mg) into the muscle once as needed (If unable to keep oral hydrocortisone due to vomiting.) Dispense Act-O-Vial  Yes Faizan Mclean MD   levothyroxine (SYNTHROID/LEVOTHROID) 150 MCG tablet Take 1 tablet (150 mcg) by mouth every morning At 0500 5/22/2021 at Unknown time Yes Faizan Mclean MD   melatonin (MELATONIN) 1 MG/ML LIQD liquid 6 mg by Per NG tube route At Bedtime  5/22/2021 at Unknown time Yes Unknown, Entered By History   metoclopramide (REGLAN) 10 MG/10ML SOLN solution Take 10 mg by mouth 4 times daily (before meals and nightly) 0800, 1200, 1600, 2000  Disconnects bag before administration, then waits 45 mins before reconnecting after giving the medication 5/22/2021 at 2000 Yes Unknown, Entered By History   miconazole (MICATIN) 2 % AERP powder  Apply topically 2 times daily as needed   Yes Unknown, Entered By History   mupirocin (BACTROBAN) 2 % external ointment Apply topically 2 times daily as needed   Yes Reported, Patient   pantoprazole (PROTONIX) 2 mg/mL SUSP suspension 20 mLs (40 mg) by Per J Tube route daily  Yes Alvaro Barahona MD   potassium & sodium phosphates (NEUTRA-PHOS) 280-160-250 MG Packet Take 1 packet by mouth 3 times daily  5/22/2021 at Unknown time Yes Yanely Liriano MD   prochlorperazine (COMPAZINE) 25 MG suppository Place 1 suppository (25 mg) rectally every 12 hours as needed for nausea or vomiting  Yes Alvaro Barahona MD   Scopolamine HBr POWD Dispense #90. Mix contents with small amount of water for admin via J-tube.  Administer 0.8 mg three times each day. 5/22/2021 at Unknown time Yes Jennie Bermudez MD   Skin Protectants, Misc. (BALMEX SKIN PROTECTANT) OINT Externally apply topically 2 times daily as needed (irritation) Applay to reddened memo areas twice daily as needed  Yes Unknown, Entered By History   sodium bicarbonate 650 MG tablet Take 1 tablet (650 mg) by mouth daily 5/22/2021 at Unknown time Yes Ricky Torres MD   testosterone cypionate (DEPOTESTOSTERONE) 200 MG/ML injection Inject 0.38 mLs (76 mg) into the muscle every 7 days 5/21/2021 Yes Faizan Mclean MD   vitamin C (ASCORBIC ACID) 1000 MG TABS 1,000 mg by Oral or Feeding Tube route daily  5/22/2021 at Unknown time Yes Unknown, Entered By History   vitamin D3 (CHOLECALCIFEROL) 2000 units (50 mcg) tablet Take 2,000 Units by mouth daily Crush and feed via j-tube @@ 0900 5/22/2021 at Unknown time Yes Unknown, Entered By History       The information provided in this note is only as accurate as the sources available at the time of update(s)

## 2021-05-24 PROBLEM — R13.10 DYSPHAGIA: Status: ACTIVE | Noted: 2021-05-24

## 2021-05-24 PROBLEM — R50.9 FEVER OF UNKNOWN ORIGIN: Status: RESOLVED | Noted: 2019-12-28 | Resolved: 2021-05-24

## 2021-05-24 PROBLEM — S06.9XAA TBI (TRAUMATIC BRAIN INJURY) (H): Status: ACTIVE | Noted: 2021-05-24

## 2021-05-24 PROBLEM — A41.9 SEPSIS DUE TO PNEUMONIA (H): Status: RESOLVED | Noted: 2018-11-02 | Resolved: 2021-05-24

## 2021-05-24 PROBLEM — R65.10 SIRS (SYSTEMIC INFLAMMATORY RESPONSE SYNDROME) (H): Status: RESOLVED | Noted: 2019-09-13 | Resolved: 2021-05-24

## 2021-05-24 PROBLEM — J69.0 ASPIRATION PNEUMONIA OF RIGHT LOWER LOBE DUE TO VOMIT (H): Status: RESOLVED | Noted: 2020-05-17 | Resolved: 2021-05-24

## 2021-05-24 PROBLEM — R11.2 NAUSEA AND VOMITING: Status: RESOLVED | Noted: 2020-05-17 | Resolved: 2021-05-24

## 2021-05-24 PROBLEM — R65.20 SEVERE SEPSIS (H): Status: RESOLVED | Noted: 2019-03-09 | Resolved: 2021-05-24

## 2021-05-24 PROBLEM — A41.9 SEVERE SEPSIS (H): Status: RESOLVED | Noted: 2019-03-09 | Resolved: 2021-05-24

## 2021-05-24 PROBLEM — J18.9 SEPSIS DUE TO PNEUMONIA (H): Status: RESOLVED | Noted: 2018-11-02 | Resolved: 2021-05-24

## 2021-05-24 LAB
ANION GAP SERPL CALCULATED.3IONS-SCNC: 4 MMOL/L (ref 3–14)
BUN SERPL-MCNC: 9 MG/DL (ref 7–30)
CALCIUM SERPL-MCNC: 8.3 MG/DL (ref 8.5–10.1)
CHLORIDE SERPL-SCNC: 112 MMOL/L (ref 94–109)
CO2 SERPL-SCNC: 27 MMOL/L (ref 20–32)
CREAT SERPL-MCNC: 0.8 MG/DL (ref 0.66–1.25)
ERYTHROCYTE [DISTWIDTH] IN BLOOD BY AUTOMATED COUNT: 13.1 % (ref 10–15)
GFR SERPL CREATININE-BSD FRML MDRD: >90 ML/MIN/{1.73_M2}
GLUCOSE BLDC GLUCOMTR-MCNC: 146 MG/DL (ref 70–99)
GLUCOSE BLDC GLUCOMTR-MCNC: 148 MG/DL (ref 70–99)
GLUCOSE BLDC GLUCOMTR-MCNC: 152 MG/DL (ref 70–99)
GLUCOSE BLDC GLUCOMTR-MCNC: 155 MG/DL (ref 70–99)
GLUCOSE BLDC GLUCOMTR-MCNC: 158 MG/DL (ref 70–99)
GLUCOSE BLDC GLUCOMTR-MCNC: 162 MG/DL (ref 70–99)
GLUCOSE SERPL-MCNC: 141 MG/DL (ref 70–99)
HCT VFR BLD AUTO: 37.2 % (ref 40–53)
HGB BLD-MCNC: 11.9 G/DL (ref 13.3–17.7)
MAGNESIUM SERPL-MCNC: 2.2 MG/DL (ref 1.6–2.3)
MCH RBC QN AUTO: 28.2 PG (ref 26.5–33)
MCHC RBC AUTO-ENTMCNC: 32 G/DL (ref 31.5–36.5)
MCV RBC AUTO: 88 FL (ref 78–100)
PHOSPHATE SERPL-MCNC: 2.4 MG/DL (ref 2.5–4.5)
PLATELET # BLD AUTO: ABNORMAL 10E9/L (ref 150–450)
POTASSIUM SERPL-SCNC: 4.1 MMOL/L (ref 3.4–5.3)
RBC # BLD AUTO: 4.22 10E12/L (ref 4.4–5.9)
SODIUM SERPL-SCNC: 143 MMOL/L (ref 133–144)
WBC # BLD AUTO: 9.8 10E9/L (ref 4–11)

## 2021-05-24 PROCEDURE — 94640 AIRWAY INHALATION TREATMENT: CPT

## 2021-05-24 PROCEDURE — 120N000001 HC R&B MED SURG/OB

## 2021-05-24 PROCEDURE — 85027 COMPLETE CBC AUTOMATED: CPT | Performed by: INTERNAL MEDICINE

## 2021-05-24 PROCEDURE — 250N000013 HC RX MED GY IP 250 OP 250 PS 637: Performed by: HOSPITALIST

## 2021-05-24 PROCEDURE — 94664 DEMO&/EVAL PT USE INHALER: CPT

## 2021-05-24 PROCEDURE — 84100 ASSAY OF PHOSPHORUS: CPT | Performed by: INTERNAL MEDICINE

## 2021-05-24 PROCEDURE — 94640 AIRWAY INHALATION TREATMENT: CPT | Mod: 76

## 2021-05-24 PROCEDURE — 258N000003 HC RX IP 258 OP 636: Performed by: HOSPITALIST

## 2021-05-24 PROCEDURE — 83735 ASSAY OF MAGNESIUM: CPT | Performed by: INTERNAL MEDICINE

## 2021-05-24 PROCEDURE — 80048 BASIC METABOLIC PNL TOTAL CA: CPT | Performed by: INTERNAL MEDICINE

## 2021-05-24 PROCEDURE — 250N000013 HC RX MED GY IP 250 OP 250 PS 637: Performed by: INTERNAL MEDICINE

## 2021-05-24 PROCEDURE — 36415 COLL VENOUS BLD VENIPUNCTURE: CPT | Performed by: INTERNAL MEDICINE

## 2021-05-24 PROCEDURE — 99232 SBSQ HOSP IP/OBS MODERATE 35: CPT | Performed by: INTERNAL MEDICINE

## 2021-05-24 PROCEDURE — 999N001017 HC STATISTIC GLUCOSE BY METER IP

## 2021-05-24 PROCEDURE — 250N000011 HC RX IP 250 OP 636: Performed by: INTERNAL MEDICINE

## 2021-05-24 PROCEDURE — 999N000157 HC STATISTIC RCP TIME EA 10 MIN

## 2021-05-24 PROCEDURE — 250N000009 HC RX 250: Performed by: INTERNAL MEDICINE

## 2021-05-24 RX ADMIN — CARBAMAZEPINE 150 MG: 100 SUSPENSION ORAL at 13:35

## 2021-05-24 RX ADMIN — POTASSIUM & SODIUM PHOSPHATES POWDER PACK 280-160-250 MG 1 PACKET: 280-160-250 PACK at 18:01

## 2021-05-24 RX ADMIN — INSULIN ASPART 1 UNITS: 100 INJECTION, SOLUTION INTRAVENOUS; SUBCUTANEOUS at 08:06

## 2021-05-24 RX ADMIN — PANTOPRAZOLE SODIUM 40 MG: 40 TABLET, DELAYED RELEASE ORAL at 08:08

## 2021-05-24 RX ADMIN — ACETYLCYSTEINE 2 ML: 200 SOLUTION ORAL; RESPIRATORY (INHALATION) at 16:02

## 2021-05-24 RX ADMIN — BRIVARACETAM 100 MG: 10 SOLUTION ORAL at 09:59

## 2021-05-24 RX ADMIN — INSULIN ASPART 1 UNITS: 100 INJECTION, SOLUTION INTRAVENOUS; SUBCUTANEOUS at 12:26

## 2021-05-24 RX ADMIN — ACETYLCYSTEINE 2 ML: 200 SOLUTION ORAL; RESPIRATORY (INHALATION) at 20:00

## 2021-05-24 RX ADMIN — ASPIRIN 81 MG CHEWABLE TABLET 81 MG: 81 TABLET CHEWABLE at 08:07

## 2021-05-24 RX ADMIN — SODIUM CHLORIDE: 9 INJECTION, SOLUTION INTRAVENOUS at 21:22

## 2021-05-24 RX ADMIN — POTASSIUM & SODIUM PHOSPHATES POWDER PACK 280-160-250 MG 1 PACKET: 280-160-250 PACK at 21:20

## 2021-05-24 RX ADMIN — AMPICILLIN SODIUM AND SULBACTAM SODIUM 3 G: 2; 1 INJECTION, POWDER, FOR SOLUTION INTRAMUSCULAR; INTRAVENOUS at 01:16

## 2021-05-24 RX ADMIN — AMPICILLIN SODIUM AND SULBACTAM SODIUM 3 G: 2; 1 INJECTION, POWDER, FOR SOLUTION INTRAMUSCULAR; INTRAVENOUS at 06:28

## 2021-05-24 RX ADMIN — INSULIN ASPART 1 UNITS: 100 INJECTION, SOLUTION INTRAVENOUS; SUBCUTANEOUS at 01:15

## 2021-05-24 RX ADMIN — INSULIN ASPART 1 UNITS: 100 INJECTION, SOLUTION INTRAVENOUS; SUBCUTANEOUS at 21:17

## 2021-05-24 RX ADMIN — HYDROCORTISONE 15 MG: 10 TABLET ORAL at 13:36

## 2021-05-24 RX ADMIN — ALBUTEROL SULFATE 2.5 MG: 2.5 SOLUTION RESPIRATORY (INHALATION) at 07:16

## 2021-05-24 RX ADMIN — CARBAMAZEPINE 150 MG: 100 SUSPENSION ORAL at 01:15

## 2021-05-24 RX ADMIN — SODIUM CHLORIDE: 9 INJECTION, SOLUTION INTRAVENOUS at 09:59

## 2021-05-24 RX ADMIN — METOCLOPRAMIDE HYDROCHLORIDE 10 MG: 5 SOLUTION ORAL at 21:21

## 2021-05-24 RX ADMIN — METOCLOPRAMIDE HYDROCHLORIDE 10 MG: 5 SOLUTION ORAL at 12:14

## 2021-05-24 RX ADMIN — ALBUTEROL SULFATE 2.5 MG: 2.5 SOLUTION RESPIRATORY (INHALATION) at 20:00

## 2021-05-24 RX ADMIN — AMPICILLIN SODIUM AND SULBACTAM SODIUM 3 G: 2; 1 INJECTION, POWDER, FOR SOLUTION INTRAMUSCULAR; INTRAVENOUS at 12:14

## 2021-05-24 RX ADMIN — CARBAMAZEPINE 100 MG: 100 SUSPENSION ORAL at 18:05

## 2021-05-24 RX ADMIN — METOCLOPRAMIDE HYDROCHLORIDE 10 MG: 5 SOLUTION ORAL at 18:01

## 2021-05-24 RX ADMIN — ACETYLCYSTEINE 2 ML: 200 SOLUTION ORAL; RESPIRATORY (INHALATION) at 11:19

## 2021-05-24 RX ADMIN — METOCLOPRAMIDE HYDROCHLORIDE 10 MG: 5 SOLUTION ORAL at 08:07

## 2021-05-24 RX ADMIN — ALBUTEROL SULFATE 2.5 MG: 2.5 SOLUTION RESPIRATORY (INHALATION) at 11:19

## 2021-05-24 RX ADMIN — Medication 1 PACKET: at 12:28

## 2021-05-24 RX ADMIN — BRIVARACETAM 100 MG: 10 SOLUTION ORAL at 21:37

## 2021-05-24 RX ADMIN — HYDROCORTISONE 15 MG: 10 TABLET ORAL at 08:07

## 2021-05-24 RX ADMIN — INSULIN ASPART 1 UNITS: 100 INJECTION, SOLUTION INTRAVENOUS; SUBCUTANEOUS at 04:13

## 2021-05-24 RX ADMIN — HYDROCORTISONE 15 MG: 10 TABLET ORAL at 21:19

## 2021-05-24 RX ADMIN — SODIUM BICARBONATE 650 MG TABLET 650 MG: at 08:07

## 2021-05-24 RX ADMIN — CARBAMAZEPINE 150 MG: 100 SUSPENSION ORAL at 08:07

## 2021-05-24 RX ADMIN — POTASSIUM & SODIUM PHOSPHATES POWDER PACK 280-160-250 MG 1 PACKET: 280-160-250 PACK at 08:07

## 2021-05-24 RX ADMIN — MINERAL SUPPLEMENT IRON 300 MG / 5 ML STRENGTH LIQUID 100 PER BOX UNFLAVORED 220 MG: at 08:08

## 2021-05-24 RX ADMIN — LEVOTHYROXINE SODIUM 150 MCG: 150 TABLET ORAL at 06:28

## 2021-05-24 RX ADMIN — AMPICILLIN SODIUM AND SULBACTAM SODIUM 3 G: 2; 1 INJECTION, POWDER, FOR SOLUTION INTRAMUSCULAR; INTRAVENOUS at 18:09

## 2021-05-24 RX ADMIN — ALBUTEROL SULFATE 2.5 MG: 2.5 SOLUTION RESPIRATORY (INHALATION) at 16:06

## 2021-05-24 RX ADMIN — ACETYLCYSTEINE 2 ML: 200 SOLUTION ORAL; RESPIRATORY (INHALATION) at 07:16

## 2021-05-24 RX ADMIN — INSULIN ASPART 1 UNITS: 100 INJECTION, SOLUTION INTRAVENOUS; SUBCUTANEOUS at 17:11

## 2021-05-24 ASSESSMENT — ACTIVITIES OF DAILY LIVING (ADL)
ADLS_ACUITY_SCORE: 38
DEPENDENT_IADLS:: CLEANING;COOKING;LAUNDRY;SHOPPING;MEAL PREPARATION;MEDICATION MANAGEMENT;MONEY MANAGEMENT;TRANSPORTATION;INCONTINENCE
ADLS_ACUITY_SCORE: 38

## 2021-05-24 NOTE — PLAN OF CARE
DATE & TIME: 5/24/21 (8214-9076)  Cognitive Concerns/ Orientation : Alert, unable to assess orientation (pt. Nonverbal-able to respond to yes/no questions); calm and cooperative   BEHAVIOR & AGGRESSION TOOL COLOR: Green   CIWA SCORE: NA    ABNL VS/O2: VSS, RA, max temp 98.6 ax and 98.4 ax   MOBILITY: total cares, turn and repo Q2h  PAIN MANAGMENT: denies  DIET: Strict NPO, Jejunostomy tube feeding running goal rate of 55 mL/hr, tolerating well with free flush frequency of 60 mL q4h (pre-programed in pump)  BOWEL/BLADDER: BS active x 4, incontinent of B&B, male external catheter in place with good output, replaced this shift, pt had incontinent BM x2 small   ABNL LAB/BG: WC=803 & 148, Hemoglobin 11.9, Phosphorus 2.4 (pt currently on oral phosphorus scheduled)   DRAIN/DEVICES: PIV infusing NS @ 100 mL/hr, IV antibiotics; GJ tube- G tube clamped/J-tube continuous enteral feedings  TELEMETRY RHYTHM: NA   SKIN: pale, blanchable redness to R/L. Buttock (new mepilex in place)  TESTS/PROCEDURES: NA  D/C DAY/GOALS/PLACE: pending discharge tomorrow, home with mom   OTHER IMPORTANT INFO: infrequent congested cough, suction at bedside; lungs diminished/coarse, scheduled nebs; contact precautions maintained;

## 2021-05-24 NOTE — PROGRESS NOTES
Pt received all nebs this shift. Patient has been weaned down to RA. Spo2 94-96%.    Skin intact no issues.     RT will continue to follow and monitor.    Elen Corley, RT

## 2021-05-24 NOTE — PROGRESS NOTES
Care Management Initial Consult    General Information  Assessment completed with: Mother Savannah, she is also his Guardian  Type of CM/SW Visit: Initial Assessment    Primary Care Provider verified and updated as needed: Yes   Readmission within the last 30 days: no previous admission in last 30 days      Reason for Consult: care coordination/care conference, discharge planning  Advance Care Planning:       General Information Comments: High readmission risk    Communication Assessment  Patient's communication style: (Nonverbal)             Cognitive  Cognitive/Neuro/Behavioral: .WDL except        Orientation: other (see comments)(HECTOR)        Speech: unable to speak    Living Environment:   People in home: parent(s), other (see comments)(Gerber TRIPLETT also lives with them)     Current living Arrangements: house      Able to return to prior arrangements: yes       Family/Social Support:  Care provided by: parent(s), other (see comments)  Provides care for: no one  Marital Status: Single             Description of Support System:           Current Resources:   Patient receiving home care services: Yes  Skilled Home Care Services: Skilled Nursing  Community Resources: [facility], Home Care, PCA, Transportation Services  Equipment currently used at home: hospital bed, lift device, wheelchair, power  Supplies currently used at home: Incontinence Supplies, Enteral Nutrition & Supplies    Employment/Financial:  Employment Status: disabled        Financial Concerns: Active Medicare and Medicaid, No concerns identified    In home Nursing 12 hours a day via CADI waiver and also 12 hours/day PCA services and housekeeping services.       Lifestyle & Psychosocial Needs:        Socioeconomic History     Marital status: Single     Spouse name: Not on file     Number of children: Not on file     Years of education: Not on file     Highest education level: Not on file     Tobacco Use     Smoking status: Former Smoker     Quit  date: 1989     Years since quittin.1     Smokeless tobacco: Never Used   Substance and Sexual Activity     Alcohol use: No     Drug use: No     Sexual activity: Never       Functional Status:  Prior to admission patient needed assistance:   Dependent ADLs:: Bathing, Dressing, Eating, Grooming, Incontinence, Positioning, Transfers, Wheelchair-with assist  Dependent IADLs:: Cleaning, Cooking, Laundry, Shopping, Meal Preparation, Medication Management, Money Management, Transportation, Incontinence       Mental Health Status:  Mental Health Status: No Current Concerns(Pt with TBI history and is nonverbal)       Chemical Dependency Status:  Chemical Dependency Status: No Current Concerns             Values/Beliefs:  Spiritual, Cultural Beliefs, Adventism Practices, Values that affect care:  No               Additional Information:  Planning discharge home tomorrow.  Have conversed with pt's Mother (she is also his Legal Guardian).   She questions why pt has gotten so weak.  She was recently told to increase the testosterone injections to weekly from every other week in hopes this would improve this.    Pt has the following servies:  Per Savannah, pt has the following services in place:  [facility] (phone [phone], fax [phone])  Approved for 12 hours RN coverage daily, but unable to get the help, so pt only has a 12 hr nurse on Wednesday and Thursday currently.  His mother fulfills the rest of the care    [facility] (12 hours PCA evening / night)   [facility]   For TF supplies  Savannah reported recent problems of having some days staffed by the nurse.  The PCA also lives with them, so does fill in extra to help Savannah, when there is no nursing available.  Offered to help her with facility placement, but she will not allow this.     Tried to schedule follow up with his [facility] PCP, but the  got message unable to schedule.  Unsure what the issue is.  Have requested they call  Savannah concerning appointment scheduling.    Transportation has been scheduled for 10:45 AM tomorrow thru [facility] via stretcher.  PCS form to be completed  Orders will need to be faxed to [facility] at ([phone])    Karen Collins RN   Inpatient Care Management  [phone]

## 2021-05-24 NOTE — PROGRESS NOTES
INTERNAL MEDICINE UPDATE    Called re: elevated glucose    Recent Labs   Lab 05/23/21  1855 05/23/21  1518 05/23/21  0250   GLC  --   --  64*   * 161*  --      Pt with elevated glucose levels. Previously low glucose related to being off TF's. TF's not running again.    PLAN:  - Order low aspart ISS for now.    Luis Westfall Jr., MD  [phone] (p)  Text Page

## 2021-05-24 NOTE — PLAN OF CARE
DATE & TIME: 5/23/2021; 4269-3496    Cognitive Concerns/ Orientation : Alert, unable to assess orientation (pt. Nonverbal-able to respond to yes/no questions); calm and cooperative   BEHAVIOR & AGGRESSION TOOL COLOR: Green   CIWA SCORE: NA    ABNL VS/O2: VSS on 2 L except BP soft at times  MOBILITY: total cares, turn and repo  PAIN MANAGMENT: denies  DIET: Strict NPO, Jejunostomy tube feeding initially running at 30 mL (now advanced to goal rate of 55 mL/hr, tolerating well), free flush frequency of 60 mL q4h (pre-programed in pump)  BOWEL/BLADDER: BS active x 4, incontinent of B&B, male external catheter in place with good output  ABNL LAB/BG: BG= 195, 166; lactic acid=2.1; Hf=135   DRAIN/DEVICES: PIV infusing NS @ 100 mL/hr, IV antibiotics; GJ tube- G tube clamped/J-tube continuous enteral feedings  TELEMETRY RHYTHM: NA   SKIN: pale, blanchable redness to R/L. Buttock (mepilex in place)  TESTS/PROCEDURES: NA  D/C DAY/GOALS/PLACE: pending discharge   OTHER IMPORTANT INFO: infrequent congested cough, suction at bedside; lungs diminished, scheduled nebs; contact precautions maintained; mother updated on plan of care

## 2021-05-24 NOTE — PLAN OF CARE
Pt nonverbal, alert and responds to y/n questions. VSS on 2L O2. Infrequent nonproductive cough, suction at bedside. Strict NPO diet, TF through GJ tube at 55ml/h. Up with Lift assist, Total care, T/R q2h. No nonverbal s/s of pain. PIV infusing NS@100ml/h. Intermittent Abx.

## 2021-05-24 NOTE — PROVIDER NOTIFICATION
MD Notification    Notified Person: MD    Notified Person Name: Dr. Westfall     Notification Date/Time: 5/23/2021; 1905    Notification Interaction: web-based paging     Purpose of Notification: Hello, pt. . Currently on tube feeding. No insulin orders at this time. Just need orders if wanting to do so. Thank you!    Orders Received: low aspart ordered. Will continue to monitor q4h.     Comments:

## 2021-05-24 NOTE — PROGRESS NOTES
[facility]    Hospitalist Progress Note    Assessment & Plan   60 year old male who was admitted on 5/23/2021 with cough and SOB:    Impression:   Principal Problem:    Aspiration pneumonitis -- improving    Acute respiratory failure with hypoxia -- improving    Hyponatremia -- corrected    Active Problems:    Panhypopituitarism (H)    Recurrent seizures (H)    TBI (traumatic brain injury) (H)    Dysphagia related to TBI -- S/P G-tube      Plan:  Discussed with mother, anticipate return to home with home care tomorrow.  Mother is talking with  to try to increase his daily nursing hours, she has my phone number if questions.     DVT Prophylaxis: Pneumatic Compression Devices  Code Status: Full Code    Disposition: Expected discharge home tomorrow with home care off O2, will need stretcher transport.     Reji Escalona [facility]  Pager [phone]  Cell Phone [phone]  Text Page (7am to 6pm)    Interval History   Non-verbal, but nods appropriately.  When asked how doing he gave a thumbs up.     Physical Exam   Temp: 98.4  F (36.9  C) Temp src: Axillary BP: 121/66 Pulse: 85   Resp: 20 SpO2: 94 % O2 Device: None (Room air) Oxygen Delivery: 2 LPM  There were no vitals filed for this visit.  Vital Signs with Ranges  Temp:  [97.3  F (36.3  C)-98.6  F (37  C)] 98.4  F (36.9  C)  Pulse:  [62-85] 85  Resp:  [20-24] 20  BP: ()/(45-66) 121/66  SpO2:  [93 %-100 %] 94 %  I/O last 3 completed shifts:  In: 2314.33 [I.V.:1020.33; NG/GT:1294]  Out: 4325 [Urine:4325]    # Pain Assessment:  Current Pain Score 5/24/2021   Patient currently in pain? denies   Pain score (0-10) -   Pain location -   Pain descriptors -   rFLACC pain score -   CPOT pain score -   Keyon liriano pain level was assessed and he currently denies pain.        Constitutional: Awake, alert, cooperative, no apparent distress, currently on room air  Respiratory: slight rhonchi bilaterally, and does not cough to clear airway  secretions   Cardiovascular: Regular rate and rhythm, normal S1 and S2, and no murmur noted  GI: Normal bowel sounds, soft, non-distended, non-tender  Extrem: No calf tenderness, no ankle edema  Neuro:  Alert, but no verbal answers (all gestures), unable to walk, generalized weakness    Medications     dextrose       sodium chloride 100 mL/hr at 05/24/21 0959       acetylcysteine  2 mL Nebulization 4x Daily     albuterol  2.5 mg Nebulization Q4H While awake     ampicillin-sulbactam (UNASYN) IV  3 g Intravenous Q6H     aspirin  81 mg Per Feeding Tube Daily     Brivaracetam  100 mg Oral or Feeding Tube BID     carBAMazepine  100 mg Oral Daily     carBAMazepine  150 mg Oral or Feeding Tube TID     ferrous sulfate  220 mg Per Feeding Tube Daily     fiber modular (NUTRISOURCE FIBER)  1 packet Per Feeding Tube Daily     hydrocortisone  15 mg Oral or Feeding Tube TID     insulin aspart  1-4 Units Subcutaneous Q4H     levothyroxine  150 mcg Oral QAM     metoclopramide  10 mg Oral 4x Daily AC & HS     pantoprazole  40 mg Per J Tube Daily     potassium & sodium phosphates  1 packet Per Feeding Tube TID     sodium bicarbonate  650 mg Per Feeding Tube Daily     sodium chloride (PF)  3 mL Intracatheter Q8H       Data   Recent Labs   Lab 05/24/21  0819 05/23/21  1043 05/23/21  0250   WBC 9.8  --  11.9*   HGB 11.9*  --  13.6   MCV 88  --  88   PLT Platelets clumped, not reported  --  212     --  128*   POTASSIUM 4.1 3.8 4.6   CHLORIDE 112*  --  94   CO2 27  --  28   BUN 9  --  12   CR 0.80  --  0.81   ANIONGAP 4  --  6   STEVE 8.3*  --  8.7   *  --  64*   ALBUMIN  --   --  3.3*   PROTTOTAL  --   --  7.8   BILITOTAL  --   --  0.3   ALKPHOS  --   --  101   ALT  --   --  28   AST  --   --  40       Imaging:   No results found for this or any previous visit (from the past 24 hour(s)).

## 2021-05-25 ENCOUNTER — APPOINTMENT (OUTPATIENT)
Dept: INTERVENTIONAL RADIOLOGY/VASCULAR | Facility: CLINIC | Age: 61
DRG: 871 | End: 2021-05-25
Attending: INTERNAL MEDICINE
Payer: MEDICARE

## 2021-05-25 VITALS
SYSTOLIC BLOOD PRESSURE: 132 MMHG | DIASTOLIC BLOOD PRESSURE: 68 MMHG | BODY MASS INDEX: 24.62 KG/M2 | HEART RATE: 70 BPM | WEIGHT: 176.5 LBS | RESPIRATION RATE: 18 BRPM | TEMPERATURE: 98.2 F | OXYGEN SATURATION: 97 %

## 2021-05-25 LAB
CARBAMAZEPINE EP FREE SERPL-MCNC: 1.2 UG/ML (ref 0.2–2)
CARBAMAZEPINE EP SERPL-MCNC: 2.5 UG/ML (ref 0.4–4)
CARBAMAZEPINE FREE SERPL-MCNC: 2.1 UG/ML (ref 0.6–4.2)
CARBAMAZEPINE SERPL-MCNC: 7.9 UG/ML (ref 4–12)
GLUCOSE BLDC GLUCOMTR-MCNC: 136 MG/DL (ref 70–99)
GLUCOSE BLDC GLUCOMTR-MCNC: 163 MG/DL (ref 70–99)
GLUCOSE BLDC GLUCOMTR-MCNC: 92 MG/DL (ref 70–99)
GLUCOSE BLDC GLUCOMTR-MCNC: 96 MG/DL (ref 70–99)

## 2021-05-25 PROCEDURE — 94640 AIRWAY INHALATION TREATMENT: CPT

## 2021-05-25 PROCEDURE — 0D2DXUZ CHANGE FEEDING DEVICE IN LOWER INTESTINAL TRACT, EXTERNAL APPROACH: ICD-10-PCS | Performed by: RADIOLOGY

## 2021-05-25 PROCEDURE — 49452 REPLACE G-J TUBE PERC: CPT

## 2021-05-25 PROCEDURE — 999N000157 HC STATISTIC RCP TIME EA 10 MIN

## 2021-05-25 PROCEDURE — 250N000013 HC RX MED GY IP 250 OP 250 PS 637: Performed by: HOSPITALIST

## 2021-05-25 PROCEDURE — 258N000003 HC RX IP 258 OP 636: Performed by: HOSPITALIST

## 2021-05-25 PROCEDURE — 272N000584 ZZ HC TUBE GASTRO CR13

## 2021-05-25 PROCEDURE — 250N000011 HC RX IP 250 OP 636: Performed by: INTERNAL MEDICINE

## 2021-05-25 PROCEDURE — 250N000009 HC RX 250: Performed by: INTERNAL MEDICINE

## 2021-05-25 PROCEDURE — 250N000013 HC RX MED GY IP 250 OP 250 PS 637: Performed by: INTERNAL MEDICINE

## 2021-05-25 PROCEDURE — 99239 HOSP IP/OBS DSCHRG MGMT >30: CPT | Performed by: INTERNAL MEDICINE

## 2021-05-25 PROCEDURE — 999N001017 HC STATISTIC GLUCOSE BY METER IP

## 2021-05-25 PROCEDURE — C1769 GUIDE WIRE: HCPCS

## 2021-05-25 PROCEDURE — 94640 AIRWAY INHALATION TREATMENT: CPT | Mod: 76

## 2021-05-25 RX ORDER — AMOXICILLIN AND CLAVULANATE POTASSIUM 250; 62.5 MG/5ML; MG/5ML
500 POWDER, FOR SUSPENSION ORAL 3 TIMES DAILY
Qty: 90 ML | Refills: 0 | Status: SHIPPED | OUTPATIENT
Start: 2021-05-25 | End: 2021-05-28

## 2021-05-25 RX ADMIN — CARBAMAZEPINE 150 MG: 100 SUSPENSION ORAL at 00:15

## 2021-05-25 RX ADMIN — BRIVARACETAM 100 MG: 10 SOLUTION ORAL at 13:05

## 2021-05-25 RX ADMIN — POTASSIUM & SODIUM PHOSPHATES POWDER PACK 280-160-250 MG 1 PACKET: 280-160-250 PACK at 13:08

## 2021-05-25 RX ADMIN — SODIUM BICARBONATE 650 MG TABLET 650 MG: at 13:09

## 2021-05-25 RX ADMIN — INSULIN ASPART 1 UNITS: 100 INJECTION, SOLUTION INTRAVENOUS; SUBCUTANEOUS at 00:16

## 2021-05-25 RX ADMIN — PANTOPRAZOLE SODIUM 40 MG: 40 TABLET, DELAYED RELEASE ORAL at 13:04

## 2021-05-25 RX ADMIN — AMPICILLIN SODIUM AND SULBACTAM SODIUM 3 G: 2; 1 INJECTION, POWDER, FOR SOLUTION INTRAMUSCULAR; INTRAVENOUS at 00:16

## 2021-05-25 RX ADMIN — MINERAL SUPPLEMENT IRON 300 MG / 5 ML STRENGTH LIQUID 100 PER BOX UNFLAVORED 220 MG: at 13:05

## 2021-05-25 RX ADMIN — Medication 1 PACKET: at 13:05

## 2021-05-25 RX ADMIN — ACETYLCYSTEINE 2 ML: 200 SOLUTION ORAL; RESPIRATORY (INHALATION) at 08:00

## 2021-05-25 RX ADMIN — AMPICILLIN SODIUM AND SULBACTAM SODIUM 3 G: 2; 1 INJECTION, POWDER, FOR SOLUTION INTRAMUSCULAR; INTRAVENOUS at 11:26

## 2021-05-25 RX ADMIN — ALBUTEROL SULFATE 2.5 MG: 2.5 SOLUTION RESPIRATORY (INHALATION) at 07:59

## 2021-05-25 RX ADMIN — LEVOTHYROXINE SODIUM 150 MCG: 150 TABLET ORAL at 04:59

## 2021-05-25 RX ADMIN — METOCLOPRAMIDE HYDROCHLORIDE 10 MG: 5 SOLUTION ORAL at 06:09

## 2021-05-25 RX ADMIN — CARBAMAZEPINE 150 MG: 100 SUSPENSION ORAL at 05:57

## 2021-05-25 RX ADMIN — CARBAMAZEPINE 150 MG: 100 SUSPENSION ORAL at 13:05

## 2021-05-25 RX ADMIN — ASPIRIN 81 MG CHEWABLE TABLET 81 MG: 81 TABLET CHEWABLE at 13:08

## 2021-05-25 RX ADMIN — AMPICILLIN SODIUM AND SULBACTAM SODIUM 3 G: 2; 1 INJECTION, POWDER, FOR SOLUTION INTRAMUSCULAR; INTRAVENOUS at 05:57

## 2021-05-25 RX ADMIN — HYDROCORTISONE 15 MG: 10 TABLET ORAL at 13:05

## 2021-05-25 RX ADMIN — SODIUM CHLORIDE: 9 INJECTION, SOLUTION INTRAVENOUS at 09:57

## 2021-05-25 RX ADMIN — METOCLOPRAMIDE HYDROCHLORIDE 10 MG: 5 SOLUTION ORAL at 13:05

## 2021-05-25 ASSESSMENT — ACTIVITIES OF DAILY LIVING (ADL)
ADLS_ACUITY_SCORE: 38
ADLS_ACUITY_SCORE: 38
ADLS_ACUITY_SCORE: 46
ADLS_ACUITY_SCORE: 38

## 2021-05-25 NOTE — PROGRESS NOTES
Discharge    Patient discharged to home via stretcher with HE transport     Listed belongings gathered and returned to patient. Yes  Belongings returned to patient from security/pharmacy Yes  Care Plan and Patient education resolved: Yes  Prescriptions if needed, hard copies sent with patient  Yes  Home and hospital acquired medications returned to patient: NA  Medication Bin checked and emptied on discharge Yes  Follow up appointment made for patient: No

## 2021-05-25 NOTE — IR NOTE
Interventional Radiology Intra-procedural Nursing Note    Patient Name: Keyon Farias  Medical Record Number: [id number]  Today's Date: May 25, 2021    Start Time: 1228   End of procedure time: 1230  Procedure: G-J Tube Exchange  Report given to: 66 RN  Time pt departs:  1242    Other Notes: Pt into IR suite 2 via cart. Pt awake and alert. To table in supine position. Monitoring equipment applied. VSS. Dr. Pike in room. Time out and procedure started. Pt tolerated procedure well. Debrief with Dr. Pike.  No complications. Pt transferred back to [address].    Medications:  None    Thomas Baumann RN

## 2021-05-25 NOTE — PROGRESS NOTES
Unfortunately, transportation set-up for 10:45 this AM has been cancelled due to tube feeding is clogged and will need to be replaced.  Conversed with Osiris from IR.  FT will be replaced this AM.  Transportation has been rescheduled for 2:00 PM today.    Pt's Mother Savannah was updated.  She would like the Hospitalist to call her today.  [facility] has called her for appointment scheduling, phone follow up appointment is scheduled for Friday 5/28 at 2:00 PM per Savannah's report.  Savannah is trying to find a transportation company that can transport him in his wheelchair.  Unsure why she does not already have something in place.  Encouraged her to call her [facility] .  She asked who that was.  She has a  that approves services for Keyon and will call that person.

## 2021-05-25 NOTE — PLAN OF CARE
DATE & TIME: 5/25/2021 9752-9888  Cognitive Concerns/ Orientation : HECTOR orientation, pt able to give thumbs up and smile to questions. Pt nonverbal but makes moaning and groaning noises.   BEHAVIOR & AGGRESSION TOOL COLOR: Green  CIWA SCORE: NA  ABNL VS/O2: VSS on RA  MOBILITY: Total, turn and repo Q2H  PAIN MANAGMENT: No non-verbal indicators of pain  DIET: NPO, TF running at 55mL/hr  BOWEL/BLADDER: Incontinent  ABNL LAB/BG: BGM 92, 96  DRAIN/DEVICES: IVF running at 100mL/hr  TELEMETRY RHYTHM: NA  SKIN: Pale, bruised, small open area on L buttocks  TESTS/PROCEDURES: New feeding tube placed due to previous one being clogged  D/C DATE: Discharging back home @ 1400 via HE transport  Discharge Barriers: New feeding tube to be placed  OTHER IMPORTANT INFO: LS coarse/diminished. STONE, congested cough noted. On IV Unasyn. On Scheduled nebs.

## 2021-05-25 NOTE — PLAN OF CARE
DATE & TIME: 5/25/21 11p-7a  Cognitive Concerns/ Orientation : Alert, unable to assess orientation, pt. Mostly nonverbal, occasional vocalizations or nods yes/no questions; calm and cooperative   BEHAVIOR & AGGRESSION TOOL COLOR: Green   CIWA SCORE: NA    ABNL VS/O2: VSS, RA  MOBILITY: total cares, turn and repo Q2h  PAIN MANAGMENT: denies  DIET: Strict NPO, Jejunostomy tube feeding running at goal rate of 55 mL/hr, tolerating well with free flush frequency of 60 mL q4h (pre-programed in pump)  BOWEL/BLADDER: BS active x 4, incontinent of B&B, male external catheter in place with good output, replaced this shift, pt had incontinent BM x1 moderate amt liquid  ABNL LAB/BG: HV=785,136, Hemoglobin 11.9, Phosphorus 2.4 (pt currently on oral phosphorus scheduled)   DRAIN/DEVICES: PIV infusing NS @ 100 mL/hr, IV antibiotics; GJ tube- G tube clamped/J-tube continuous enteral feedings  TELEMETRY RHYTHM: NA   SKIN: pale, blanchable redness to R/L. Buttock , MEPILEX applied to escoriatted areas.  TESTS/PROCEDURES: NA  D/C DAY/GOALS/PLACE: discharge today 10:45am with [facility] stretcher, home with mom.  OTHER IMPORTANT INFO: infrequent congested cough, suctioned orally x1 lungs diminished/coarse, scheduled nebs during waking hours; contact precautions maintained.

## 2021-05-25 NOTE — DISCHARGE SUMMARY
[facility]    Discharge Summary  Hospitalist    Date of Admission:  5/23/2021  Date of Discharge:  5/25/2021  Discharging Provider: Reji Connolly MD    Discharge Diagnoses   Principal Problem:    Aspiration pneumonitis     Sepsis related to aspiration pneumonia    Acute respiratory failure with hypoxia (H)    Lactic Acidosis  Active Problems:    Panhypopituitarism (H)    Recurrent seizures (H)    Hyponatremia -- corrected, possibly related to pneumonia    TBI (traumatic brain injury) (H)    Dysphagia related to TBI -- S/P G-tube      History of Present Illness   60-year-old gentleman with a past medical history of TBI with a right-sided spastic hemiplegia, severe dysphagia, status post PEG placement, history of multiple admissions for aspiration pneumonia, 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  Has tube feedings.     In ER, temp 99.6, , RR 44, WBC 11.9, sodium 128, lactate 4.6, CXR with RML and RLL pneumonia.     Hospital Course   Admitted to medicine floor, treated with IV fluids and IV antibiotics, lactate 2.1 in 8 hours, WBC improved to 9.8 and fever resolved within 48 hours.  Initially in respiratory distress and O2 sat was 93% on 2 LPM.     Will switch to Augmentin 500 mg tid for another 3 days, and will continue with home care.  Discussed with his mother who is primary care giver.  At discharge was on room air with O2 sat 97% and RR of 18.      Reji Connolly MD  Pager: [phone]  Cell Phone:  [phone]       Significant Results and Procedures   As above    Pending Results   These results will be followed up by Dr. Connolly  Unresulted Labs Ordered in the Past 30 Days of  this Admission     Date and Time Order Name Status Description    5/23/2021 0915 Carbamazepine and epoxide free and total In process     5/23/2021 0352 Blood culture Preliminary     5/23/2021 0352 Blood culture Preliminary           Code Status   Full Code       Primary Care Physician   Carlos Gomez    Physical Exam   Temp: 98.2  F (36.8  C) Temp src: Axillary BP: 132/68 Pulse: 70   Resp: 18 SpO2: 97 % O2 Device: None (Room air)    Vitals:    05/24/21 1953   Weight: 80.1 kg (176 lb 8 oz)     Vital Signs with Ranges  Temp:  [97.6  F (36.4  C)-98.7  F (37.1  C)] 98.2  F (36.8  C)  Pulse:  [70-93] 70  Resp:  [18-20] 18  BP: (126-132)/(62-68) 132/68  SpO2:  [92 %-97 %] 97 %  I/O last 3 completed shifts:  In: 1814 [NG/GT:661]  Out: 2850 [Urine:2850]    Exam on discharge:   Lungs clear  Neuro -- pleasant but aphasic    Discharge Disposition   Discharged to home  Condition at discharge: Fair    Consultations This Hospital Stay   NUTRITION SERVICES ADULT IP CONSULT  CARE MANAGEMENT / SOCIAL WORK IP CONSULT  PHARMACY IP CONSULT  NUTRITION SERVICES ADULT IP CONSULT    Time Spent on this Encounter   I spent a total of 35 minutes discharging this patient.     Discharge Orders      Home care nursing referral      Reason for your hospital stay    Aspiration pneumonia     Follow-up and recommended labs and tests     Follow up with primary care provider, Carlos Gomez, as scheduled.     Activity    Your activity upon discharge: activity as tolerated (up with assist)     Discharge Instructions     MD face to face encounter    Documentation of Face to Face and Certification for Home Health Services    I certify that patient: Keyon Farias is under my care and that I, or a nurse practitioner or physician's assistant working with me, had a face-to-face encounter that meets the physician face-to-face encounter requirements with this patient on: 5/25/2021.    This encounter with the patient was in whole, or in part, for the following  medical condition, which is the primary reason for home health care: Aspiration pneumonia.    I certify that, based on my findings, the following services are medically necessary home health services: Nursing.    My clinical findings support the need for the above services because: Nurse is needed: To assess concerns about aspiration after changes in medications or other medical regimen..    Further, I certify that my clinical findings support that this patient is homebound (i.e. absences from home require considerable and taxing effort and are for medical reasons or [employer] services or infrequently or of short duration when for other reasons) because: Leaving home is medically contraindicated for the following reason(s): Other physician ordered restriction: bedbound 2nd to TBI...    Based on the above findings. I certify that this patient is confined to the home and needs intermittent skilled nursing care, physical therapy and/or speech therapy.  The patient is under my care, and I have initiated the establishment of the plan of care.  This patient will be followed by a physician who will periodically review the plan of care.  Physician/Provider to provide follow up care: Carlos Gomez    Attending hospital physician (the Medicare certified [place] provider): Reji Loco MD  Physician Signature: See electronic signature associated with these discharge orders.  Date: 5/25/2021     Discharge Instructions    Call Dr. Loco if any medical questions at Cell Phone [phone].     Diet    Follow this diet upon discharge: Orders Placed This Encounter      Adult Formula Drip Feeding: Continuous Jevity 1.5; Jejunostomy; Goal Rate: 55; mL/hr; Medication - Feeding Tube Flush Frequency: At least 15-30 mL water before and after medication administration and with tube clogging; 1 pkt nutrisource fiber renea...      NPO for Medical/Clinical Reasons Except for: No Exceptions     Discharge Medications   Current  Discharge Medication List      START taking these medications    Details   amoxicillin-clavulanate (AUGMENTIN) 250-62.5 MG/5ML suspension 10 mLs (500 mg) by Oral or G tube route 3 times daily for 3 days  Qty: 90 mL, Refills: 0    Associated Diagnoses: Aspiration pneumonitis (H)         CONTINUE these medications which have NOT CHANGED    Details   acetaminophen (TYLENOL 8 HOUR) 650 MG CR tablet Take 650 mg by mouth every 8 hours as needed for mild pain or fever      acetylcysteine (MUCOMYST) 20 % neb solution Take 2 mLs by nebulization 4 times daily With albuterol at 0700, 1100, 1500, and 1900       albuterol (PROVENTIL) (5 MG/ML) 0.5% neb solution Take 2.5 mg by nebulization every 4 hours (while awake) 0700 1100 1500 1900 with mucomyst       aspirin (ASA) 81 MG chewable tablet 81 mg by Oral or Feeding Tube route daily At 0900      bacitracin ointment Apply topically daily as needed for wound care To PEG site.       Brivaracetam (BRIVIACT) 10 MG/ML solution 100 mg by Oral or Feeding Tube route 2 times daily 0900, 2100      calcium carbonate 1250 MG/5ML SUSP suspension Take 1,250 mg by mouth 3 times daily 0900, 1500, 2100      !! carBAMazepine (TEGRETOL) 100 MG/5ML suspension Take 100 mg by mouth daily Take at 1800       !! carBAMazepine (TEGRETOL) 100 MG/5ML suspension 150 mg by Oral or Feeding Tube route 3 times daily At 06:00, 12:00, and 24:00 for seizures      clotrimazole-betamethasone (LOTRISONE) 1-0.05 % external cream Apply topically 2 times daily as needed   Qty:        ferrous sulfate 220 (44 Fe) MG/5ML ELIX 220 mg by Per Feeding Tube route daily @ 0900      Guar Gum (FIBER MODULAR, NUTRISOURCE FIBER,) packet 1 packet by Per Feeding Tube route daily      hydrocortisone (CORTEF) 5 MG tablet 3 tablets (15 mg) by Oral or Feeding Tube route every morning And 1 tablet (5 mg) at 2:00 PM. During illness please increase the dose as directed.  Qty: 270 tablet, Refills: 3    Associated Diagnoses: Secondary adrenal  insufficiency (H)      hydrocortisone 1 % CREA cream Place rectally 2 times daily as needed for other Apply to reddened memo areas as needed      hydrocortisone sodium succinate PF (SOLU-CORTEF) 100 MG injection Inject 1 mL (50 mg) into the muscle once as needed (If unable to keep oral hydrocortisone due to vomiting.) Dispense Act-O-Vial  Qty: 10 mL, Refills: 1    Comments: Dispense Act-O-Vial  Associated Diagnoses: Secondary adrenal insufficiency (H)      levothyroxine (SYNTHROID/LEVOTHROID) 150 MCG tablet Take 1 tablet (150 mcg) by mouth every morning At 0500  Qty: 90 tablet, Refills: 1    Associated Diagnoses: Secondary hypothyroidism      melatonin (MELATONIN) 1 MG/ML LIQD liquid 6 mg by Per NG tube route At Bedtime       metoclopramide (REGLAN) 10 MG/10ML SOLN solution Take 10 mg by mouth 4 times daily (before meals and nightly) 0800, 1200, 1600, 2000  Disconnects bag before administration, then waits 45 mins before reconnecting after giving the medication      miconazole (MICATIN) 2 % AERP powder Apply topically 2 times daily as needed       mupirocin (BACTROBAN) 2 % external ointment Apply topically 2 times daily as needed       pantoprazole (PROTONIX) 2 mg/mL SUSP suspension 20 mLs (40 mg) by Per J Tube route daily  Qty: 400 mL, Refills: 0    Comments: Future refills by PCP Dr. Carlos Gomez with phone number [phone].  Associated Diagnoses: Gastroesophageal reflux disease, esophagitis presence not specified      potassium & sodium phosphates (NEUTRA-PHOS) 280-160-250 MG Packet Take 1 packet by mouth 3 times daily       prochlorperazine (COMPAZINE) 25 MG suppository Place 1 suppository (25 mg) rectally every 12 hours as needed for nausea or vomiting  Qty: 15 suppository, Refills: 0    Comments: Future refills by PCP Dr. Carlos Gomez with phone number [phone].  Associated Diagnoses: Aspiration pneumonia of right middle lobe, unspecified aspiration pneumonia type (H)      Scopolamine HBr POWD  Dispense #90. Mix contents with small amount of water for admin via J-tube.  Administer 0.8 mg three times each day.  Qty: 90 Bottle, Refills: 3    Associated Diagnoses: Aspiration pneumonia (H)      Skin Protectants, Misc. (BALMEX SKIN PROTECTANT) OINT Externally apply topically 2 times daily as needed (irritation) Applay to reddened memo areas twice daily as needed      sodium bicarbonate 650 MG tablet Take 1 tablet (650 mg) by mouth daily  Qty: 30 tablet, Refills: 0    Associated Diagnoses: Hyponatremia      testosterone cypionate (DEPOTESTOSTERONE) 200 MG/ML injection Inject 0.38 mLs (76 mg) into the muscle every 7 days  Qty: 6 mL, Refills: 1    Associated Diagnoses: Panhypopituitarism (H); Hypogonadism male      vitamin C (ASCORBIC ACID) 1000 MG TABS 1,000 mg by Oral or Feeding Tube route daily       vitamin D3 (CHOLECALCIFEROL) 2000 units (50 mcg) tablet Take 2,000 Units by mouth daily Crush and feed via j-tube @@ 0900       !! - Potential duplicate medications found. Please discuss with provider.        Allergies   Allergies   Allergen Reactions     Valproic Acid Other (See Comments)     Toxicity w/ bone marrow suspension, elevated ammonia levels      Dilantin [Phenytoin Sodium] Other (See Comments)     Severe Trembling     Scopolamine Hives     Hives with the patch - oral no problem     Data   Most Recent 3 CBC's:  Recent Labs   Lab Test 05/24/21  0819 05/23/21  0250 04/15/21  2250   WBC 9.8 11.9* 6.9   HGB 11.9* 13.6 12.0*   MCV 88 88 88   PLT Platelets clumped, not reported 212 154      Most Recent 3 BMP's:  Recent Labs   Lab Test 05/24/21  0819 05/23/21  1043 05/23/21  0250 04/22/21  1214     --  128* 136   POTASSIUM 4.1 3.8 4.6 3.9   CHLORIDE 112*  --  94 100   CO2 27  --  28 30   BUN 9  --  12 21   CR 0.80  --  0.81 0.87   ANIONGAP 4  --  6 6   STEVE 8.3*  --  8.7 9.1   *  --  64* 159*     Most Recent 2 LFT's:  Recent Labs   Lab Test 05/23/21  0250 04/15/21  2250   AST 40 17   ALT 28 22    ALKPHOS 101 95   BILITOTAL 0.3 0.4     Most Recent INR's and Anticoagulation Dosing History:  Anticoagulation Dose History     Recent Dosing and Labs Latest Ref Rng & Units 1/25/2017 1/30/2017 2/7/2017 1/1/2020 10/25/2020 10/25/2020 10/26/2020    INR 0.86 - 1.14 1.49(H) 1.32(H) 1.27(H) 1.28(H) 1.89(H) 1.86(H) 1.82(H)        Most Recent 3 Troponin's:  Recent Labs   Lab Test 04/15/21  2250 02/19/21  1123 10/25/20  2100   TROPI <0.015 <0.015 0.047*     Most Recent Cholesterol Panel:  Recent Labs   Lab Test 11/29/16  0430   TRIG 100     Most Recent 6 Bacteria Isolates From Any Culture (See EPIC Reports for Culture Details):  Recent Labs   Lab Test 05/23/21  0316 05/23/21  0250 04/15/21  2250 02/22/21  1622 02/22/21  1439 02/22/21  1413   CULT No growth after 3 days No growth after 3 days No growth No growth No growth No growth     Most Recent TSH, T4 and A1c Labs:  Recent Labs   Lab Test 05/23/21  0250 04/22/21  1214 07/05/18  0021 07/05/18  0021   TSH  --   --   --  <0.01*   T4  --  1.40   < > 1.66*   A1C 5.6  --    < >  --     < > = values in this interval not displayed.

## 2021-05-25 NOTE — PLAN OF CARE
DATE & TIME: 5/24/21 8563-6187  Cognitive Concerns/ Orientation : Alert, unable to assess orientation (pt. Mostly nonverbal, occasional vocalizstiohnsNonverbal-able to respond to yes/no questions); calm and cooperative   BEHAVIOR & AGGRESSION TOOL COLOR: Green   CIWA SCORE: NA    ABNL VS/O2: VSS, RA  MOBILITY: total cares, turn and repo Q2h  PAIN MANAGMENT: denies  DIET: Strict NPO, Jejunostomy tube feeding running goal rate of 55 mL/hr, tolerating well with free flush frequency of 60 mL q4h (pre-programed in pump)  BOWEL/BLADDER: BS active x 4, incontinent of B&B, male external catheter in place with good output, replaced this shift, pt had incontinent BM x2 miderate amt  ABNL LAB/BG: XL=471 & 148, Hemoglobin 11.9, Phosphorus 2.4 (pt currently on oral phosphorus scheduled)   DRAIN/DEVICES: PIV infusing NS @ 100 mL/hr, IV antibiotics; GJ tube- G tube clamped/J-tube continuous enteral feedings  TELEMETRY RHYTHM: NA   SKIN: pale, blanchable redness to R/L. Buttock , MEPILEX applied to escoriatted area left buttocks  TESTS/PROCEDURES: NA  D/C DAY/GOALS/PLACE: pending discharge tomorrow, home with mom   OTHER IMPORTANT INFO: infrequent congested cough, suctioned orally x1 lungs diminished/coarse, scheduled nebs; contact precautions maintained.

## 2021-05-25 NOTE — PROGRESS NOTES
Patient is on IR schedule today Tuesday 5/25/21 for a GJ tube exchange.   -  No NPO required.   -Phone consent was obtained from mother Savannah after explaining the procedure, risks and benefits and consent is in IR.     Please contact the IR department at [phone] for procedural related questions.       Discussed with Karen CHRISTINE Care Coordinator today.    Thanks Osiris Leikarriechuh CNP Interventional Radiology ([phone])

## 2021-05-26 RX ORDER — SCOPOLAMINE HYDROBROMIDE
POWDER (GRAM) MISCELLANEOUS
Status: CANCELLED | OUTPATIENT
Start: 2021-05-26

## 2021-05-26 NOTE — PLAN OF CARE
Ferritin level is very low, suggesting iron deficiency.  This could also be contributing to some of her symptoms.  Recommend starting ferrous sulfate 325 mg (65 mg elemental Fe) once daily.  Please  on possible GI side effects and how to manage, and try to find a formulation of iron supplement that is gluten free.  Vitamin D is normal, so Silvia can decrease her dose of vitamin D supplement to just 1,000IU daily (half of what currently taking).      TSH still mildly low, free T4 still normal.  I have added additional thyroid tests to evaluate this further, which are still pending.  Pt is nonverbal. AVSS on room air. R arm contracted. LS clear. BS active, flatus+. Incontinent of urine. Mepilex on coccyx per non-blanchable redness and small open areas. PEG tube dressing CDI. Turned q2h.

## 2021-06-08 DIAGNOSIS — J69.0 ASPIRATION PNEUMONIA OF BOTH LOWER LOBES, UNSPECIFIED ASPIRATION PNEUMONIA TYPE (H): ICD-10-CM

## 2021-06-08 DIAGNOSIS — S06.9X9S TRAUMATIC BRAIN INJURY WITH LOSS OF CONSCIOUSNESS, SEQUELA (H): ICD-10-CM

## 2021-06-08 DIAGNOSIS — J69.0 ASPIRATION PNEUMONIA OF BOTH LUNGS DUE TO GASTRIC SECRETIONS, UNSPECIFIED PART OF LUNG (H): Primary | ICD-10-CM

## 2021-06-08 DIAGNOSIS — R13.10 DYSPHAGIA, UNSPECIFIED TYPE: ICD-10-CM

## 2021-06-08 DIAGNOSIS — J69.0 ASPIRATION PNEUMONITIS (H): ICD-10-CM

## 2021-06-08 RX ORDER — SCOPOLAMINE HYDROBROMIDE
POWDER (GRAM) MISCELLANEOUS
Qty: 450 G | Refills: 1 | Status: SHIPPED | OUTPATIENT
Start: 2021-06-08 | End: 2022-04-28

## 2021-07-21 ENCOUNTER — LAB (OUTPATIENT)
Dept: LAB | Facility: CLINIC | Age: 61
End: 2021-07-21
Attending: INTERNAL MEDICINE
Payer: MEDICARE

## 2021-07-21 DIAGNOSIS — E29.1 HYPOGONADISM MALE: ICD-10-CM

## 2021-07-21 DIAGNOSIS — E23.2 DIABETES INSIPIDUS (H): ICD-10-CM

## 2021-07-21 DIAGNOSIS — Z51.81 ENCOUNTER FOR MONITORING TESTOSTERONE REPLACEMENT THERAPY: ICD-10-CM

## 2021-07-21 DIAGNOSIS — Z79.890 ENCOUNTER FOR MONITORING TESTOSTERONE REPLACEMENT THERAPY: ICD-10-CM

## 2021-07-21 DIAGNOSIS — E23.0 PANHYPOPITUITARISM (H): ICD-10-CM

## 2021-07-21 LAB
ANION GAP SERPL CALCULATED.3IONS-SCNC: 4 MMOL/L (ref 3–14)
BUN SERPL-MCNC: 13 MG/DL (ref 7–30)
CALCIUM SERPL-MCNC: 8.8 MG/DL (ref 8.5–10.1)
CHLORIDE BLD-SCNC: 95 MMOL/L (ref 94–109)
CO2 SERPL-SCNC: 30 MMOL/L (ref 20–32)
CREAT SERPL-MCNC: 0.8 MG/DL (ref 0.66–1.25)
GFR SERPL CREATININE-BSD FRML MDRD: >90 ML/MIN/1.73M2
GLUCOSE BLD-MCNC: 93 MG/DL (ref 70–99)
POTASSIUM BLD-SCNC: 3.9 MMOL/L (ref 3.4–5.3)
SODIUM SERPL-SCNC: 129 MMOL/L (ref 133–144)

## 2021-07-21 PROCEDURE — 80048 BASIC METABOLIC PNL TOTAL CA: CPT

## 2021-07-21 PROCEDURE — 36415 COLL VENOUS BLD VENIPUNCTURE: CPT

## 2021-07-21 PROCEDURE — 84403 ASSAY OF TOTAL TESTOSTERONE: CPT | Performed by: INTERNAL MEDICINE

## 2021-07-22 NOTE — RESULT ENCOUNTER NOTE
Please inform Guardian that Na is low. Please make sure that water administration via tube feeding shouldn't exceed previously recommendations.  Please check follow up Na on Monday.   If pt is experiencing change in symptoms; should be evaluated because low Na previously have been a sign of infection in [place] previously.   Thank you,   Faizan Mclean MD

## 2021-07-23 ENCOUNTER — TELEPHONE (OUTPATIENT)
Dept: ENDOCRINOLOGY | Facility: CLINIC | Age: 61
End: 2021-07-23

## 2021-07-23 LAB — TESTOST SERPL-MCNC: 670 NG/DL (ref 240–950)

## 2021-07-23 NOTE — TELEPHONE ENCOUNTER
"I spoke with niharika and it looks like the last progress note has they he should be getting 120 ml ( or 4 Oz) every 4 hours . She feels they have been doing more than that. She tells me if he doesn't get enough water he gets a little \"loopy\" His adie is saying if he has night sweats which has been happening more frequently he also gives him free water extra.   She will bring him in Tuesday for the sodium check she can't on Monday  But will decrease the free water to what it's suppose to be . Stephany Redding RN on 7/23/2021 at 10:50 AM    "

## 2021-07-23 NOTE — TELEPHONE ENCOUNTER
----- Message from Faizan Mclean MD sent at 7/22/2021  5:14 PM CDT -----  Please inform Guardian that Na is low. Please make sure that water administration via tube feeding shouldn't exceed previously recommendations.  Please check follow up Na on Monday.   If pt is experiencing change in symptoms; should be evaluated because low Na previously have been a sign of infection in [place] previously.   Thank you,   Faizan Mclean MD

## 2021-07-24 ENCOUNTER — HOSPITAL ENCOUNTER (EMERGENCY)
Facility: CLINIC | Age: 61
Discharge: HOME | End: 2021-07-24
Attending: EMERGENCY MEDICINE | Admitting: EMERGENCY MEDICINE
Payer: MEDICARE

## 2021-07-24 VITALS
TEMPERATURE: 97.6 F | OXYGEN SATURATION: 96 % | HEART RATE: 65 BPM | RESPIRATION RATE: 16 BRPM | DIASTOLIC BLOOD PRESSURE: 77 MMHG | SYSTOLIC BLOOD PRESSURE: 129 MMHG

## 2021-07-24 DIAGNOSIS — T85.598A OBSTRUCTION OF FEEDING TUBE, INITIAL ENCOUNTER: ICD-10-CM

## 2021-07-24 PROCEDURE — 272N000587 ZZ HC TUBE GASTRO CR4

## 2021-07-24 PROCEDURE — 99284 EMERGENCY DEPT VISIT MOD MDM: CPT | Mod: 25

## 2021-07-24 PROCEDURE — 49450 REPLACE G/C TUBE PERC: CPT

## 2021-07-24 ASSESSMENT — ENCOUNTER SYMPTOMS: FEVER: 0

## 2021-07-24 NOTE — ED NOTES
Clog zapper x2 attempts unsuccessful. Clog noted to be in J-tube line, pt unable to accept food/fluid through G-tube line. ED MD aware.

## 2021-07-24 NOTE — ED NOTES
Bed: ED13  Expected date: 7/24/21  Expected time: 12:04 PM  Means of arrival: Ambulance  Comments:  [facility] 60m clogged Gtube ETA 1214

## 2021-07-24 NOTE — ED NOTES
Mother, who is legal guardian, signed for discharge instructions prior to leaving for home. Mother informed of approximate ETA of 1700hours for ambulance transport home.

## 2021-07-24 NOTE — ED PROVIDER NOTES
History   Chief Complaint:  G tube problem       History is provided by the mother.    HPI   Keyon Farias is a 60 year old male with history of spatic hemiplegia, DVT, aspiration pneumonitis, TBI, cardiac arrest, seizures, sepsis, and GJ tube placeent who presents with a GJ tube problem. She says he has had this tube in for the last 2-3 months and she presents with him today for a clog in his tube. She says that he usually has the tube changed around every 4 months. She says that the tube is used for food and medicine as he cannot have any oral intake for risk of aspiration which usually leads to pneumonia, which he has not had since January. She denies any fever.    Review of Systems   Constitutional: Negative for fever.   All other systems reviewed and are negative.      Allergies:  Valproic acid  Dilantin  Scopolamine    Medications:  Bacitracin  Briviact  Calcium carbonate  Tegretol  Ferrous sulfate  Cortef  Solu-cortef  Synthroid/Levothroid  Reglan  Protonix  Neutra-phos  Compazine  Depotestosterone  Ascorbic acid  Cholecalciferol    Past Medical History:    Aphasia  DVT  GERD  Panhypopituitarism  Seizures  Sepsis  Spatic hemiplegia  Thyroid disease  TBI  UTI  Ventricular fibrillation  Ventricular tachyarrhythmia  Contracture  Transient alteration of awareness  Aspiration pneumonitis  Necrotizing pancreatitis  Appendicitis   Cardiac arrest    Past Surgical History:    Endoscopic ultrasound upper GI tract  EGD combined  Head and neck surgery  IR gastro jejunostomy tube change  Laparoscopic appendectomy  Right hand repair  Tracheostomy  Vascular surgery     Family History:    Cancer    Social History:  Patient came from home.  Patient is accompanied in the ED by his mother.    Physical Exam     Patient Vitals for the past 24 hrs:   BP Pulse SpO2   07/24/21 1221 135/80 58 95 %       Physical Exam    GENERAL: well developed, pleasant, awake and smiling, non-verbal  HEAD: atraumatic  EYES: pupils reactive,  extraocular muscles intact, conjunctivae normal  ENT:  mucus membranes moist  NECK:  trachea midline, normal range of motion  RESPIRATORY: no tachypnea, breath sounds clear to auscultation   CVS: normal S1/S2, no murmurs, intact distal pulses  ABDOMEN: soft, nontender, nondistention, GJ tube present  MUSCULOSKELETAL: contractures  SKIN: warm and dry, no acute rashes or ulceration  NEURO: GCS 15, cranial nerves intact, alert and interacitve  PSYCH:  Mood/affect normal    Emergency Department Course     Imaging:    INT Gastro jejunostomy tube placement      (Results Pending)    Emergency Department Course:    Reviewed:  I reviewed nursing notes, vitals, past medical history and care everywhere    Assessments:  1300 I obtained history and examined the patient as noted above.   1455 I rechecked the patient and explained findings.     Consults:   1454 I consulted with IR and discussed patient findings and plan of care.    Disposition:  The patient was discharged to home.       Impression & Plan       CMS Diagnoses: None      Medical Decision Making:  Patient presents with plugged GJ tube, with the G portion still working.  Mother tried warm water and Coke at home.  We tried warm water and clog zapper without success.  I called IR and they note they can not do it until Monday.  Mother was upset and complained about lack of services.  Discussed continued use of G tube and tube exchange on Monday.       Diagnosis:    ICD-10-CM    1. Obstruction of feeding tube, initial encounter  T85.598A INT Gastro jejunostomy tube placement       Discharge Medications:  New Prescriptions    No medications on file       Scribe Disclosure:  Tristan DYKES, am serving as a scribe at 12:58 PM on 7/24/2021 to document services personally performed by Randy Avila MD based on my observations and the provider's statements to me.            Randy Avila MD  07/24/21 2016

## 2021-07-26 ENCOUNTER — HOSPITAL ENCOUNTER (OUTPATIENT)
Facility: CLINIC | Age: 61
Discharge: HOME | End: 2021-07-26
Attending: RADIOLOGY | Admitting: RADIOLOGY
Payer: MEDICARE

## 2021-07-26 ENCOUNTER — APPOINTMENT (OUTPATIENT)
Dept: INTERVENTIONAL RADIOLOGY/VASCULAR | Facility: CLINIC | Age: 61
End: 2021-07-26
Attending: EMERGENCY MEDICINE
Payer: MEDICARE

## 2021-07-26 VITALS
RESPIRATION RATE: 18 BRPM | OXYGEN SATURATION: 95 % | HEART RATE: 77 BPM | SYSTOLIC BLOOD PRESSURE: 118 MMHG | DIASTOLIC BLOOD PRESSURE: 75 MMHG

## 2021-07-26 PROCEDURE — 999N000163 HC STATISTIC SIMPLE TUBE INSERTION/CHARGE, PORT, CATH, FISTULOGRAM

## 2021-07-26 PROCEDURE — 272N000584 ZZ HC TUBE GASTRO CR13

## 2021-07-26 PROCEDURE — C1769 GUIDE WIRE: HCPCS

## 2021-07-26 PROCEDURE — 49452 REPLACE G-J TUBE PERC: CPT

## 2021-07-26 RX ORDER — MULTIVITAMIN,THERAPEUTIC
1 TABLET ORAL DAILY
COMMUNITY

## 2021-07-26 NOTE — PRE-PROCEDURE
GENERAL PRE-PROCEDURE:   Procedure:  Gastrojejunostomy tube exchange  Date/Time:  7/26/2021 2:30 PM    Risks and benefits: Risks, benefits and alternatives were discussed    DC Plan: Appropriate discharge home plan in place for patients who are going home after procedure   Consent given by:  Parent  Patient states understanding of procedure being performed: Yes    Patient's understanding of procedure matches consent: Yes    Procedure consent matches procedure scheduled: Yes    Appropriately NPO:  Yes

## 2021-07-26 NOTE — PLAN OF CARE
Care Suites Admission Nursing Note    Patient Information  Name: Keyon Farias  Age: 60 year old  Reason for admission: Gtube exchange  Care Suites arrival time: 1400     Visitor Information  Name: mother Nuñez  Informed of visitor restrictions: Yes  1 visitor allowed per patient   Visitor must screen negative for COVID symptoms   Visitor must wear a mask  Waiting rooms closed to visitors    Patient Admission/Assessment   Pre-procedure assessment complete:   Yes  If abnormal assessment/labs, provider notified: N/A  NPO: Yes  Medications held per instructions/orders: N/A  Consent: deferred  If applicable, pregnancy test status: deferred  Patient oriented to room: Yes  Education/questions answered: Yes  Plan/other: Gtube exchange    Discharge Planning  Discharge name/phone number: Savannah  [phone]  Overnight post sedation caregiver: Mother  Discharge location: home    Shea Groves RN       PATIENT/VISITOR WELLNESS SCREENING    Step 1 Patient Screening    1. In the last month, have you been in contact with someone who was confirmed or suspected to have Coronavirus/COVID-19? No    2. Do you have the following symptoms?  Fever/Chills? No   Cough? No   Shortness of breath? No   New loss of taste or smell? No  Sore throat? No  Muscle or body aches? No  Headaches? No  Fatigue? No  Vomiting or diarrhea? No    Step 2 Visitor Screening    1. Name of Visitor (1 visitor per patient): Mother Savannah    2. In the last month, have you been in contact with someone who was confirmed or suspected to have Coronavirus/COVID-19? No    3. Do you have the following symptoms?  Fever/Chills? No   Cough? No   Shortness of breath? No   Skin rash? No   Loss of taste or smell? No  Sore throat? No  Runny or stuffy nose? No  Muscle or body aches? No  Headaches? No  Fatigue? No  Vomiting or diarrhea? No    If the visitor has positive symptoms, notify supervisor/manger  Per policy, the visitor will need to leave the facility     Step 3  Refer to logic grid below for actions    NO SYMPTOM(S)    ACTIONS:  1. Standard rooming process  2. Provider to assess per normal protocol  3. Implement precautions as needed and per guidelines     POSITIVE SYMPTOM(S)  If positive for ANY of the following symptoms: fever, cough, shortness of breath, rash    ACTION:  1. Continue to have the patient wear a mask   2. Room patient as soon as possible  3. Don appropriate PPE when entering room  4. Provider evaluation

## 2021-07-26 NOTE — DISCHARGE INSTRUCTIONS
G-tube Exchange Discharge Instructions     After you go home:      You may resume your normal diet    Care of Insertion Site:      For the first 48 hrs, check your puncture site every couple hours while you are awake     You may remove/change the dressing tomorrow    You may shower tomorrow    No tub baths, whirlpools or swimming until your puncture site has fully healed     Activity       You may go back to normal activity in 24 hours    Wait 48 hours before lifting, straining, exercise or other strenuous activity    Medicines:      You may resume all medications    Resume your Aspirin tomorrow at your regular dose    For minor pain, you may take Acetaminophen (Tylenol)                  Call the provider who ordered this procedure if:      Blood or fluid is draining from the site    The site is red, swollen, hot, tender or there is foul-smelling drainage    Chills or a fever greater than 101 F (38 C)    Increased pain at the site    Any questions or concerns    Call  911 or go to the Emergency Room if:      Severe pain or trouble breathing    Bleeding that you cannot control      If you have questions call:          [facility] Radiology Dept @ [phone]        Caring for your G-tube    Tube Maintenance:    For ENFit Tubes:      Do NOT over tighten the connections (the purple end & hub connection).      Clean the connecting ends (especially the hub) every day.      If you are unable to disconnect the tubing ends, soak the connection in warm water (or wrap in a wet warm washcloth) to try and loosen the connection.    Possible problems with your tube may include:      Clogged with medications or feedings - most obstructions can be cleared with a small (3cc) syringe and warm water. This may be repeated until the tube is unclogged. This can be prevented by frequently flushing the tube with water (60cc) during the day and always after medications & feedings.      Tube pulls out or falls out -cover the opening  with gauze & tape. Call [phone] for further instructions      Skin breakdown and/or yeast infections at the insertion site - use of skin barrier ointments and anti-fungal powders can treat most site irritations.  Ask your pharmacist or provider for assistance (a prescription is not necessary).    In general, tube problems (including pulled tubes) are NOT emergency situations. Unless the pulling out of a tube is accompanied by uncontrollable bleeding, please DO NOT GO TO THE EMERGENCY ROOM!  Call [phone] with problems.    Tube Care:      Change the gauze dressing every 24 hours and if soiled (dirty).  Stabilize all tubes securely by using gauze and tape.  Clean tube site with soap & water using a cotton applicator (Q-tip) as needed to prevent irritation.    Flush feeding tube with 60cc of warm or room temperature water before and after meds.  To prevent the tube from clogging, ask your provider or pharmacist if liquid forms of your medications are available. If not, crush the pills well & be sure to flush the tube before & after all medications.    Flush feeding tube a minimum of every 4 hours and when feeding is completed with 60cc of water to keep the tube clear and avoid clogging.    Pt may use an abdominal (waist) binder to protect the G-tube.    If there is continued oozing or bleeding, redness, yellow/green/foul smelling drainage    STOP the feedings & use of the tube immediately if there is:      Continued oozing or bleeding at the site    Redness    Yellow/green or foul smelling drainage at the site    Uncontrolled stomach pain    Many of the supplies mentioned above can be purchased at your local pharmacy      For issues with your tube, please call:    [facility] at [phone]

## 2021-07-26 NOTE — IR NOTE
Patient Name: Keyon Farias  Medical Record Number: [id number]  Today's Date: July 26, 2021    Start Time: 1444  End of procedure time: 1448  Procedure: gastrojejunostomy tube exchange  Report given to: CS RN  Time pt departs:  1500    Other Notes: Pt into IR suite 2 via cart. Pt awake and alert. To table in supine position prepped and draped with 2%. Dr. Montoya in room. Time out and procedure started. Pt tolerated procedure well. Debrief with Dr. Montoya. Tube exchanged. Dressing CDI. No complications. Pt transferred back to . Report given to NANCI Valdivia.        Corinna Mansfield RN

## 2021-07-26 NOTE — PLAN OF CARE
Care Suites Discharge Nursing Note    Patient Information  Name: Keyon Farias  Age: 60 year old    Discharge Education:  Discharge instructions reviewed: Yes, with pt's mother  Additional education/resources provided: none  Patient/patient representative verbalizes understanding: Yes  Patient discharging on new medications: No  Medication education completed: N/A    Discharge Plans:   Discharge location: home  Discharge ride contacted: Yes  Approximate discharge time: 1500    Discharge Criteria:  Discharge criteria met and vital signs stable: Yes    Patient Belongs:  Patient belongings returned to patient: Yes    Shea Groves RN

## 2021-07-27 ENCOUNTER — LAB (OUTPATIENT)
Dept: LAB | Facility: CLINIC | Age: 61
End: 2021-07-27
Payer: MEDICARE

## 2021-07-27 DIAGNOSIS — E23.0 PANHYPOPITUITARISM (H): ICD-10-CM

## 2021-07-27 DIAGNOSIS — E87.1 HYPONATREMIA: ICD-10-CM

## 2021-07-27 LAB
ANION GAP SERPL CALCULATED.3IONS-SCNC: 6 MMOL/L (ref 3–14)
BUN SERPL-MCNC: 14 MG/DL (ref 7–30)
CALCIUM SERPL-MCNC: 8.7 MG/DL (ref 8.5–10.1)
CHLORIDE BLD-SCNC: 96 MMOL/L (ref 94–109)
CO2 SERPL-SCNC: 26 MMOL/L (ref 20–32)
CREAT SERPL-MCNC: 0.89 MG/DL (ref 0.66–1.25)
GFR SERPL CREATININE-BSD FRML MDRD: >90 ML/MIN/1.73M2
GLUCOSE BLD-MCNC: 101 MG/DL (ref 70–99)
POTASSIUM BLD-SCNC: 4.3 MMOL/L (ref 3.4–5.3)
SODIUM SERPL-SCNC: 128 MMOL/L (ref 133–144)

## 2021-07-27 PROCEDURE — 80048 BASIC METABOLIC PNL TOTAL CA: CPT

## 2021-07-27 PROCEDURE — 36415 COLL VENOUS BLD VENIPUNCTURE: CPT

## 2021-07-28 ENCOUNTER — VIRTUAL VISIT (OUTPATIENT)
Dept: ENDOCRINOLOGY | Facility: CLINIC | Age: 61
End: 2021-07-28
Payer: MEDICARE

## 2021-07-28 DIAGNOSIS — E27.49 SECONDARY ADRENAL INSUFFICIENCY (H): ICD-10-CM

## 2021-07-28 DIAGNOSIS — E29.1 HYPOGONADISM MALE: ICD-10-CM

## 2021-07-28 DIAGNOSIS — E23.0 PANHYPOPITUITARISM (H): Primary | ICD-10-CM

## 2021-07-28 DIAGNOSIS — E87.1 HYPONATREMIA: ICD-10-CM

## 2021-07-28 DIAGNOSIS — E23.2 DIABETES INSIPIDUS (H): ICD-10-CM

## 2021-07-28 PROCEDURE — 99215 OFFICE O/P EST HI 40 MIN: CPT | Mod: 95 | Performed by: INTERNAL MEDICINE

## 2021-07-28 RX ORDER — HYDROCORTISONE 5 MG/1
TABLET ORAL
Qty: 400 TABLET | Refills: 3 | Status: SHIPPED | OUTPATIENT
Start: 2021-07-28 | End: 2022-08-15

## 2021-07-28 RX ORDER — TESTOSTERONE CYPIONATE 200 MG/ML
60 INJECTION, SOLUTION INTRAMUSCULAR
Qty: 6 ML | Refills: 1
Start: 2021-07-28 | End: 2022-01-25

## 2021-07-28 NOTE — PROGRESS NOTES
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  Develops intermittently hyponatremia that needs treatment with DDAVP.  For example during his last hospitalization he needed DDAVP for treatment of hypernatremia.  Over the last 6 months has not been on DDAVP.   Hyponatremia: Again, he is not currently on DDAVP or has not been on DDAVP over the last 6months however last 2 blood work has shown hyponatremia with sodium of 127-128.  Patient usually gets hyponatremia whenever there is infection and talking to patient's mom today she reports that he has had fever which subsided.  He had also respiratory crackles which have cleared since.  I have advised to closely watch for any symptom change particularly symptoms suggestive of infection.  Likely whenever he is having the recurrent aspiration pneumonia he develops SIADH which might be the mechanism for hyponatremia.  In any case we are checking a follow-up sodium which was ordered today.    Secondary adrenal insufficiency  Continue Hydrocortisone (HC) 15 mg in the AM, 5mg at 3:00 PM     The lowest effective dose of hydrocortisone is what is recommended to be used.  - sick day rules - increase hydrocortisone to 2-3X of the maintenance dose during sickness like flu. Needs injection if unable to keep medication down due to vomiting.   Injectable form of hydrocortisone sent to the pharmacy to be used as needed.      Central hypothyroidism   -continue Levothyroxine 150 mcg daily.  Last free T4 within the recommended range.     Central hypogonadism   -Last testosterone level is slightly higher than the recommended range therefore I have advised to reduce the dose of testosterone to 60 mg every 7 days from  previous dose of 76 mg every 7 days.   - PSA stable.  Check hematocrit with the next blood work    I will contact the patient with the test results.  Return to clinic in 3 months.  Patient Instructions   Mykel Abel Nuñez   It was a pleasure meeting you.    Central diabetes insipidus   -Follow free water replacement therapy as previously advised  - change in mental status from baseline should prompt sodium level checks.   - If urine output increases to more than 3 L- please check sodium level.   - There is a standing order for Sodium checks as needed. Check a follow up sodium.      Secondary adrenal insufficiency  -Hydrocortisone (HC) 15 mg in the AM, 5 mg at 3:00 am.   - sick day rules - increase hydrocortisone to 3X of the maintenance dose during sickness like flu.  (for eg. Hydrocortisone to be increased to 15 mg 3 times per day during illness until he gets better). Then after he improves from illness; dose needs to go back to his usual dose of 15 mg in the morning and 5 mg in the afternoon.   -Needs injection form of hydrocortisone if unable to keep medication down due to vomiting.    - use Injectable form of hydrocortisone as needed.     Central hypothyroidism   -continue Levothyroxine 150 mcg daily     Central hypogonadism  Increase testosterone dose to 0.3 ml (60 mg) every 7 days          Please let us know if any questions.      Test and/or medications prescribed today:  Orders Placed This Encounter   Procedures     Basic metabolic panel     Hemoglobin and hematocrit       Faizan Mclean MD  Staff Endocrinologist    Division of Endocrinology and Diabetes      Subjective:         HPI: Keyon Farias is a 60 year old male with history of panhypopituitarism.    Questions today:  Scopolamine - compounded for controlling secretions have been previously prescribed and patient would like a refill on this today.  Discussed to contact our prescribing provider regarding this.  She has 1 refill and enough supply at  home in the meantime.    He had a low-grade fever yesterday which has subsided since.  He received increased dose of hydrocortisone yesterday for the fever he has had.     There has been an issue with tube feeding; now he new J-tube this week which is functioning well.        Urine output has been stable between 1-1.5 L/day.  Free water administration has been about 120 cc every 4 hours.         Allergies   Allergen Reactions     Valproic Acid Other (See Comments)     Toxicity w/ bone marrow suspension, elevated ammonia levels      Dilantin [Phenytoin Sodium] Other (See Comments)     Severe Trembling     Scopolamine Hives     Hives with the patch - oral no problem       Current Outpatient Medications   Medication Sig Dispense Refill     acetaminophen (TYLENOL 8 HOUR) 650 MG CR tablet Take 650 mg by mouth every 8 hours as needed for mild pain or fever       acetylcysteine (MUCOMYST) 20 % neb solution Take 2 mLs by nebulization 4 times daily With albuterol at 0700, 1100, 1500, and 1900        albuterol (PROVENTIL) (5 MG/ML) 0.5% neb solution Take 2.5 mg by nebulization every 4 hours (while awake) 0700 1100 1500 1900 with mucomyst        aspirin (ASA) 81 MG chewable tablet 81 mg by Oral or Feeding Tube route daily At 0900       bacitracin ointment Apply topically daily as needed for wound care To PEG site.        Brivaracetam (BRIVIACT) 10 MG/ML solution 100 mg by Oral or Feeding Tube route 2 times daily 0900, 2100       calcium carbonate 1250 MG/5ML SUSP suspension Take 1,250 mg by mouth 3 times daily 0900, 1500, 2100       carBAMazepine (TEGRETOL) 100 MG/5ML suspension Take 100 mg by mouth daily Take at 1800        carBAMazepine (TEGRETOL) 100 MG/5ML suspension 150 mg by Oral or Feeding Tube route 3 times daily At 06:00, 12:00, and 24:00 for seizures       clotrimazole-betamethasone (LOTRISONE) 1-0.05 % external cream Apply topically 2 times daily as needed        Guar Gum (FIBER MODULAR, NUTRISOURCE FIBER,) packet 1  packet by Per Feeding Tube route daily       hydrocortisone (CORTEF) 5 MG tablet Take 15 mg (3 tablets) in the morning and 5 mg (1 tablet)  at 2:00 PM. During illness patient takes more as a stress dose. Please increase the dose as directed. 400 tablet 3     hydrocortisone 1 % CREA cream Place rectally 2 times daily as needed for other Apply to reddened memo areas as needed       hydrocortisone sodium succinate PF (SOLU-CORTEF) 100 MG injection Inject 1 mL (50 mg) into the muscle once as needed (If unable to keep oral hydrocortisone due to vomiting.) Dispense Act-O-Vial 10 mL 1     melatonin (MELATONIN) 1 MG/ML LIQD liquid 6 mg by Per NG tube route At Bedtime        metoclopramide (REGLAN) 10 MG/10ML SOLN solution Take 10 mg by mouth 4 times daily (before meals and nightly) 0800, 1200, 1600, 2000  Disconnects bag before administration, then waits 45 mins before reconnecting after giving the medication       miconazole (MICATIN) 2 % AERP powder Apply topically 2 times daily as needed        multivitamin, therapeutic (THERA-VIT) TABS tablet Take 1 tablet by mouth daily       mupirocin (BACTROBAN) 2 % external ointment Apply topically 2 times daily as needed        pantoprazole (PROTONIX) 2 mg/mL SUSP suspension 20 mLs (40 mg) by Per J Tube route daily 400 mL 0     potassium & sodium phosphates (NEUTRA-PHOS) 280-160-250 MG Packet Take 1 packet by mouth 3 times daily        prochlorperazine (COMPAZINE) 25 MG suppository Place 1 suppository (25 mg) rectally every 12 hours as needed for nausea or vomiting 15 suppository 0     Scopolamine HBr POWD Dispense #90. Mix contents with small amount of water for admin via J-tube.  Administer 0.8 mg three times each day. 450 g 1     Skin Protectants, Misc. (BALMEX SKIN PROTECTANT) OINT Externally apply topically 2 times daily as needed (irritation) Applay to reddened memo areas twice daily as needed       sodium bicarbonate 650 MG tablet Take 1 tablet (650 mg) by mouth daily 30  tablet 0     testosterone cypionate (DEPOTESTOSTERONE) 200 MG/ML injection Inject 0.3 mLs (60 mg) into the muscle every 7 days 6 mL 1     vitamin C (ASCORBIC ACID) 1000 MG TABS 1,000 mg by Oral or Feeding Tube route daily        vitamin D3 (CHOLECALCIFEROL) 2000 units (50 mcg) tablet Take 2,000 Units by mouth daily Crush and feed via j-tube @@ 0900         Review of Systems      as per HPI above      Objective:   Mykel is engaged today.  Tries to use words to communicate.  Other exam was not completed as this was virtual visit    In House Labs:   Hemoglobin A1C   Date Value Ref Range Status   05/23/2021 5.6 0 - 5.6 % Final     Comment:     Normal <5.7% Prediabetes 5.7-6.4%  Diabetes 6.5% or higher - adopted from ADA   consensus guidelines.     08/12/2020 5.9 (H) 0 - 5.6 % Final     Comment:     Normal <5.7% Prediabetes 5.7-6.4%  Diabetes 6.5% or higher - adopted from ADA   consensus guidelines.     08/12/2019 6.1 (H) 0 - 5.6 % Final     Comment:     Normal <5.7% Prediabetes 5.7-6.4%  Diabetes 6.5% or higher - adopted from ADA   consensus guidelines.         T4 Free   Date Value Ref Range Status   04/22/2021 1.40 0.76 - 1.46 ng/dL Final         Creatinine   Date Value Ref Range Status   07/27/2021 0.89 0.66 - 1.25 mg/dL Final   05/24/2021 0.80 0.66 - 1.25 mg/dL Final   ]  Last Comprehensive Metabolic Panel:  Sodium   Date Value Ref Range Status   07/27/2021 128 (L) 133 - 144 mmol/L Final   05/24/2021 143 133 - 144 mmol/L Final     Potassium   Date Value Ref Range Status   07/27/2021 4.3 3.4 - 5.3 mmol/L Final   05/24/2021 4.1 3.4 - 5.3 mmol/L Final     Chloride   Date Value Ref Range Status   07/27/2021 96 94 - 109 mmol/L Final   05/24/2021 112 (H) 94 - 109 mmol/L Final     Carbon Dioxide   Date Value Ref Range Status   05/24/2021 27 20 - 32 mmol/L Final     Carbon Dioxide (CO2)   Date Value Ref Range Status   07/27/2021 26 20 - 32 mmol/L Final     Anion Gap   Date Value Ref Range Status   07/27/2021 6 3 - 14 mmol/L  Final   05/24/2021 4 3 - 14 mmol/L Final     Glucose   Date Value Ref Range Status   07/27/2021 101 (H) 70 - 99 mg/dL Final   05/24/2021 141 (H) 70 - 99 mg/dL Final     Urea Nitrogen   Date Value Ref Range Status   07/27/2021 14 7 - 30 mg/dL Final   05/24/2021 9 7 - 30 mg/dL Final     Creatinine   Date Value Ref Range Status   07/27/2021 0.89 0.66 - 1.25 mg/dL Final   05/24/2021 0.80 0.66 - 1.25 mg/dL Final     GFR Estimate   Date Value Ref Range Status   07/27/2021 >90 >60 mL/min/1.73m2 Final     Comment:     As of July 11, 2021, eGFR is calculated by the CKD-EPI creatinine equation, without race adjustment. eGFR can be influenced by muscle mass, exercise, and diet. The reported eGFR is an estimation only and is only applicable if the renal function is stable.   05/24/2021 >90 >60 mL/min/[1.73_m2] Final     Comment:     Non  GFR Calc  Starting 12/18/2018, serum creatinine based estimated GFR (eGFR) will be   calculated using the Chronic Kidney Disease Epidemiology Collaboration   (CKD-EPI) equation.       Calcium   Date Value Ref Range Status   07/27/2021 8.7 8.5 - 10.1 mg/dL Final   05/24/2021 8.3 (L) 8.5 - 10.1 mg/dL Final       Video-Visit Details    Type of service:  Video Visit = 27 minutes.   Originating Location (pt. Location): Home  Platform used for Video Visit: Doximity  50 minutes spent on the date of the encounter doing chart review, history, documentation and further activities per the note.

## 2021-07-28 NOTE — LETTER
7/28/2021       RE: Keyon Farias  [address] [place] [address]     Dear Colleague,    Thank you for referring your patient, Keyon Farias, to the [facility] ENDOCRINOLOG[facility] [place] at [facility]. Please see a copy of my visit note below.    Keyon Farias  is being evaluated via a billable video visit.      How would you like to obtain your AVS? Retrevo  For the video visit, send the invitation by: Text to cell phone: [phone]  Will anyone else be joining your video visit? 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  Develops intermittently hyponatremia that needs treatment with DDAVP.  For example during his last hospitalization he needed DDAVP for treatment of hypernatremia.  Over the last 6 months has not been on DDAVP.   Hyponatremia: Again, he is not currently on DDAVP or has not been on DDAVP over the last 6months however last 2 blood work has shown hyponatremia with sodium of 127-128.  Patient usually gets hyponatremia whenever there is infection and talking to patient's mom today she reports that he has had fever which subsided.  He had also respiratory crackles which have cleared since.  I have advised to closely watch for any symptom change particularly symptoms suggestive of infection.  Likely whenever he is having the recurrent aspiration pneumonia he develops SIADH which might be the mechanism for hyponatremia.  In any case we are checking a follow-up sodium which was ordered today.    Secondary adrenal insufficiency  Continue Hydrocortisone (HC) 15 mg in the AM, 5mg at 3:00 PM     The lowest effective dose of hydrocortisone is what is recommended  to be used.  - sick day rules - increase hydrocortisone to 2-3X of the maintenance dose during sickness like flu. Needs injection if unable to keep medication down due to vomiting.   Injectable form of hydrocortisone sent to the pharmacy to be used as needed.      Central hypothyroidism   -continue Levothyroxine 150 mcg daily.  Last free T4 within the recommended range.     Central hypogonadism   -Last testosterone level is slightly higher than the recommended range therefore I have advised to reduce the dose of testosterone to 60 mg every 7 days from previous dose of 76 mg every 7 days.   - PSA stable.  Check hematocrit with the next blood work    I will contact the patient with the test results.  Return to clinic in 3 months.  Patient Instructions   Mykel & Savannah   It was a pleasure meeting you.    Central diabetes insipidus   -Follow free water replacement therapy as previously advised  - change in mental status from baseline should prompt sodium level checks.   - If urine output increases to more than 3 L- please check sodium level.   - There is a standing order for Sodium checks as needed. Check a follow up sodium.      Secondary adrenal insufficiency  -Hydrocortisone (HC) 15 mg in the AM, 5 mg at 3:00 am.   - sick day rules - increase hydrocortisone to 3X of the maintenance dose during sickness like flu.  (for eg. Hydrocortisone to be increased to 15 mg 3 times per day during illness until he gets better). Then after he improves from illness; dose needs to go back to his usual dose of 15 mg in the morning and 5 mg in the afternoon.   -Needs injection form of hydrocortisone if unable to keep medication down due to vomiting.    - use Injectable form of hydrocortisone as needed.     Central hypothyroidism   -continue Levothyroxine 150 mcg daily     Central hypogonadism  Increase testosterone dose to 0.3 ml (60 mg) every 7 days          Please let us know if any questions.      Test and/or medications  prescribed today:  Orders Placed This Encounter   Procedures     Basic metabolic panel     Hemoglobin and hematocrit       Faizan Mclean MD  Staff Endocrinologist    Division of Endocrinology and Diabetes      Subjective:         HPI: Keyon Farias is a 60 year old male with history of panhypopituitarism.    Questions today:  Scopolamine - compounded for controlling secretions have been previously prescribed and patient would like a refill on this today.  Discussed to contact our prescribing provider regarding this.  She has 1 refill and enough supply at home in the meantime.    He had a low-grade fever yesterday which has subsided since.  He received increased dose of hydrocortisone yesterday for the fever he has had.     There has been an issue with tube feeding; now he new J-tube this week which is functioning well.        Urine output has been stable between 1-1.5 L/day.  Free water administration has been about 120 cc every 4 hours.         Allergies   Allergen Reactions     Valproic Acid Other (See Comments)     Toxicity w/ bone marrow suspension, elevated ammonia levels      Dilantin [Phenytoin Sodium] Other (See Comments)     Severe Trembling     Scopolamine Hives     Hives with the patch - oral no problem       Current Outpatient Medications   Medication Sig Dispense Refill     acetaminophen (TYLENOL 8 HOUR) 650 MG CR tablet Take 650 mg by mouth every 8 hours as needed for mild pain or fever       acetylcysteine (MUCOMYST) 20 % neb solution Take 2 mLs by nebulization 4 times daily With albuterol at 0700, 1100, 1500, and 1900        albuterol (PROVENTIL) (5 MG/ML) 0.5% neb solution Take 2.5 mg by nebulization every 4 hours (while awake) 0700 1100 1500 1900 with mucomyst        aspirin (ASA) 81 MG chewable tablet 81 mg by Oral or Feeding Tube route daily At 0900       bacitracin ointment Apply topically daily as needed for wound care To PEG site.        Brivaracetam (BRIVIACT) 10 MG/ML solution 100 mg by  Oral or Feeding Tube route 2 times daily 0900, 2100       calcium carbonate 1250 MG/5ML SUSP suspension Take 1,250 mg by mouth 3 times daily 0900, 1500, 2100       carBAMazepine (TEGRETOL) 100 MG/5ML suspension Take 100 mg by mouth daily Take at 1800        carBAMazepine (TEGRETOL) 100 MG/5ML suspension 150 mg by Oral or Feeding Tube route 3 times daily At 06:00, 12:00, and 24:00 for seizures       clotrimazole-betamethasone (LOTRISONE) 1-0.05 % external cream Apply topically 2 times daily as needed        Guar Gum (FIBER MODULAR, NUTRISOURCE FIBER,) packet 1 packet by Per Feeding Tube route daily       hydrocortisone (CORTEF) 5 MG tablet Take 15 mg (3 tablets) in the morning and 5 mg (1 tablet)  at 2:00 PM. During illness patient takes more as a stress dose. Please increase the dose as directed. 400 tablet 3     hydrocortisone 1 % CREA cream Place rectally 2 times daily as needed for other Apply to reddened memo areas as needed       hydrocortisone sodium succinate PF (SOLU-CORTEF) 100 MG injection Inject 1 mL (50 mg) into the muscle once as needed (If unable to keep oral hydrocortisone due to vomiting.) Dispense Act-O-Vial 10 mL 1     melatonin (MELATONIN) 1 MG/ML LIQD liquid 6 mg by Per NG tube route At Bedtime        metoclopramide (REGLAN) 10 MG/10ML SOLN solution Take 10 mg by mouth 4 times daily (before meals and nightly) 0800, 1200, 1600, 2000  Disconnects bag before administration, then waits 45 mins before reconnecting after giving the medication       miconazole (MICATIN) 2 % AERP powder Apply topically 2 times daily as needed        multivitamin, therapeutic (THERA-VIT) TABS tablet Take 1 tablet by mouth daily       mupirocin (BACTROBAN) 2 % external ointment Apply topically 2 times daily as needed        pantoprazole (PROTONIX) 2 mg/mL SUSP suspension 20 mLs (40 mg) by Per J Tube route daily 400 mL 0     potassium & sodium phosphates (NEUTRA-PHOS) 280-160-250 MG Packet Take 1 packet by mouth 3 times  daily        prochlorperazine (COMPAZINE) 25 MG suppository Place 1 suppository (25 mg) rectally every 12 hours as needed for nausea or vomiting 15 suppository 0     Scopolamine HBr POWD Dispense #90. Mix contents with small amount of water for admin via J-tube.  Administer 0.8 mg three times each day. 450 g 1     Skin Protectants, Misc. (BALMEX SKIN PROTECTANT) OINT Externally apply topically 2 times daily as needed (irritation) Applay to reddened memo areas twice daily as needed       sodium bicarbonate 650 MG tablet Take 1 tablet (650 mg) by mouth daily 30 tablet 0     testosterone cypionate (DEPOTESTOSTERONE) 200 MG/ML injection Inject 0.3 mLs (60 mg) into the muscle every 7 days 6 mL 1     vitamin C (ASCORBIC ACID) 1000 MG TABS 1,000 mg by Oral or Feeding Tube route daily        vitamin D3 (CHOLECALCIFEROL) 2000 units (50 mcg) tablet Take 2,000 Units by mouth daily Crush and feed via j-tube @@ 0900         Review of Systems      as per HPI above      Objective:   Mykel is engaged today.  Tries to use words to communicate.  Other exam was not completed as this was virtual visit    In House Labs:   Hemoglobin A1C   Date Value Ref Range Status   05/23/2021 5.6 0 - 5.6 % Final     Comment:     Normal <5.7% Prediabetes 5.7-6.4%  Diabetes 6.5% or higher - adopted from ADA   consensus guidelines.     08/12/2020 5.9 (H) 0 - 5.6 % Final     Comment:     Normal <5.7% Prediabetes 5.7-6.4%  Diabetes 6.5% or higher - adopted from ADA   consensus guidelines.     08/12/2019 6.1 (H) 0 - 5.6 % Final     Comment:     Normal <5.7% Prediabetes 5.7-6.4%  Diabetes 6.5% or higher - adopted from ADA   consensus guidelines.         T4 Free   Date Value Ref Range Status   04/22/2021 1.40 0.76 - 1.46 ng/dL Final         Creatinine   Date Value Ref Range Status   07/27/2021 0.89 0.66 - 1.25 mg/dL Final   05/24/2021 0.80 0.66 - 1.25 mg/dL Final   ]  Last Comprehensive Metabolic Panel:  Sodium   Date Value Ref Range Status   07/27/2021 128  (L) 133 - 144 mmol/L Final   05/24/2021 143 133 - 144 mmol/L Final     Potassium   Date Value Ref Range Status   07/27/2021 4.3 3.4 - 5.3 mmol/L Final   05/24/2021 4.1 3.4 - 5.3 mmol/L Final     Chloride   Date Value Ref Range Status   07/27/2021 96 94 - 109 mmol/L Final   05/24/2021 112 (H) 94 - 109 mmol/L Final     Carbon Dioxide   Date Value Ref Range Status   05/24/2021 27 20 - 32 mmol/L Final     Carbon Dioxide (CO2)   Date Value Ref Range Status   07/27/2021 26 20 - 32 mmol/L Final     Anion Gap   Date Value Ref Range Status   07/27/2021 6 3 - 14 mmol/L Final   05/24/2021 4 3 - 14 mmol/L Final     Glucose   Date Value Ref Range Status   07/27/2021 101 (H) 70 - 99 mg/dL Final   05/24/2021 141 (H) 70 - 99 mg/dL Final     Urea Nitrogen   Date Value Ref Range Status   07/27/2021 14 7 - 30 mg/dL Final   05/24/2021 9 7 - 30 mg/dL Final     Creatinine   Date Value Ref Range Status   07/27/2021 0.89 0.66 - 1.25 mg/dL Final   05/24/2021 0.80 0.66 - 1.25 mg/dL Final     GFR Estimate   Date Value Ref Range Status   07/27/2021 >90 >60 mL/min/1.73m2 Final     Comment:     As of July 11, 2021, eGFR is calculated by the CKD-EPI creatinine equation, without race adjustment. eGFR can be influenced by muscle mass, exercise, and diet. The reported eGFR is an estimation only and is only applicable if the renal function is stable.   05/24/2021 >90 >60 mL/min/[1.73_m2] Final     Comment:     Non  GFR Calc  Starting 12/18/2018, serum creatinine based estimated GFR (eGFR) will be   calculated using the Chronic Kidney Disease Epidemiology Collaboration   (CKD-EPI) equation.       Calcium   Date Value Ref Range Status   07/27/2021 8.7 8.5 - 10.1 mg/dL Final   05/24/2021 8.3 (L) 8.5 - 10.1 mg/dL Final       Video-Visit Details    Type of service:  Video Visit = 27 minutes.   Originating Location (pt. Location): Home  Platform used for Video Visit: Doximity  50 minutes spent on the date of the encounter doing chart  review, history, documentation and further activities per the note.

## 2021-07-28 NOTE — PATIENT INSTRUCTIONS
Mykel & Savannah   It was a pleasure meeting you.    Central diabetes insipidus   -Follow free water replacement therapy as previously advised  - change in mental status from baseline should prompt sodium level checks.   - If urine output increases to more than 3 L- please check sodium level.   - There is a standing order for Sodium checks as needed. Check a follow up sodium.      Secondary adrenal insufficiency  -Hydrocortisone (HC) 15 mg in the AM, 5 mg at 3:00 am.   - sick day rules - increase hydrocortisone to 3X of the maintenance dose during sickness like flu.  (for eg. Hydrocortisone to be increased to 15 mg 3 times per day during illness until he gets better). Then after he improves from illness; dose needs to go back to his usual dose of 15 mg in the morning and 5 mg in the afternoon.   -Needs injection form of hydrocortisone if unable to keep medication down due to vomiting.    - use Injectable form of hydrocortisone as needed.     Central hypothyroidism   -continue Levothyroxine 150 mcg daily     Central hypogonadism  Increase testosterone dose to 0.3 ml (60 mg) every 7 days          Please let us know if any questions.

## 2021-07-28 NOTE — PROGRESS NOTES
Keyon Farias  is being evaluated via a billable video visit.      How would you like to obtain your AVS? DailyLook  For the video visit, send the invitation by: Text to cell phone: [phone]  Will anyone else be joining your video visit? Justyna

## 2021-07-29 NOTE — RESULT ENCOUNTER NOTE
Results discussed directly with patient while patient was present. Any further details documented in the note.   Faizan Mclean MD

## 2021-07-29 NOTE — RESULT ENCOUNTER NOTE
Results discussed directly with guardian. Any further details documented in the note.   Faizan Mclean MD

## 2021-08-05 ENCOUNTER — LAB (OUTPATIENT)
Dept: LAB | Facility: CLINIC | Age: 61
End: 2021-08-05
Attending: INTERNAL MEDICINE
Payer: MEDICARE

## 2021-08-05 DIAGNOSIS — E29.1 HYPOGONADISM MALE: ICD-10-CM

## 2021-08-05 DIAGNOSIS — E87.1 HYPONATREMIA: ICD-10-CM

## 2021-08-05 LAB
ANION GAP SERPL CALCULATED.3IONS-SCNC: 3 MMOL/L (ref 3–14)
BUN SERPL-MCNC: 11 MG/DL (ref 7–30)
CALCIUM SERPL-MCNC: 8.7 MG/DL (ref 8.5–10.1)
CHLORIDE BLD-SCNC: 101 MMOL/L (ref 94–109)
CO2 SERPL-SCNC: 29 MMOL/L (ref 20–32)
CREAT SERPL-MCNC: 0.84 MG/DL (ref 0.66–1.25)
GFR SERPL CREATININE-BSD FRML MDRD: >90 ML/MIN/1.73M2
GLUCOSE BLD-MCNC: 91 MG/DL (ref 70–99)
HCT VFR BLD AUTO: 46.8 % (ref 40–53)
HGB BLD-MCNC: 15.3 G/DL (ref 13.3–17.7)
POTASSIUM BLD-SCNC: 4 MMOL/L (ref 3.4–5.3)
SODIUM SERPL-SCNC: 133 MMOL/L (ref 133–144)

## 2021-08-05 PROCEDURE — 36415 COLL VENOUS BLD VENIPUNCTURE: CPT

## 2021-08-05 PROCEDURE — 85014 HEMATOCRIT: CPT

## 2021-08-05 PROCEDURE — 80048 BASIC METABOLIC PNL TOTAL CA: CPT

## 2021-08-06 ENCOUNTER — TELEPHONE (OUTPATIENT)
Dept: ENDOCRINOLOGY | Facility: CLINIC | Age: 61
End: 2021-08-06
Payer: MEDICARE

## 2021-08-06 NOTE — TELEPHONE ENCOUNTER
Savannah tells me he gets 1 L of water per day and she's been adding 1/8 th teaspoon of salt twice a day to the water. She is extremely happy with Dr Vinay giang for Keyon and has been notified of normal labs.Stephany Redding RN on 8/6/2021 at 3:21 PM

## 2021-08-06 NOTE — TELEPHONE ENCOUNTER
----- Message from Faizan Mclean MD sent at 8/6/2021  2:14 PM CDT -----  Please inform to pt's Mom the following.   Savannah,     The sodium and other attached results are within the normal limits.

## 2021-08-09 ENCOUNTER — APPOINTMENT (OUTPATIENT)
Dept: GENERAL RADIOLOGY | Facility: CLINIC | Age: 61
DRG: 178 | End: 2021-08-09
Attending: EMERGENCY MEDICINE
Payer: MEDICARE

## 2021-08-09 ENCOUNTER — HOSPITAL ENCOUNTER (INPATIENT)
Facility: CLINIC | Age: 61
LOS: 2 days | Discharge: HOME | DRG: 178 | End: 2021-08-11
Attending: EMERGENCY MEDICINE | Admitting: HOSPITALIST
Payer: MEDICARE

## 2021-08-09 DIAGNOSIS — J69.0 ASPIRATION PNEUMONIA OF RIGHT LOWER LOBE DUE TO GASTRIC SECRETIONS (H): ICD-10-CM

## 2021-08-09 DIAGNOSIS — J69.0 ASPIRATION PNEUMONITIS (H): Primary | ICD-10-CM

## 2021-08-09 LAB
ALBUMIN SERPL-MCNC: 3.4 G/DL (ref 3.4–5)
ALBUMIN UR-MCNC: 30 MG/DL
ALP SERPL-CCNC: 114 U/L (ref 40–150)
ALT SERPL W P-5'-P-CCNC: 26 U/L (ref 0–70)
AMORPH CRY #/AREA URNS HPF: ABNORMAL /HPF
ANION GAP SERPL CALCULATED.3IONS-SCNC: 2 MMOL/L (ref 3–14)
APPEARANCE UR: ABNORMAL
AST SERPL W P-5'-P-CCNC: 18 U/L (ref 0–45)
BACTERIA #/AREA URNS HPF: ABNORMAL /HPF
BASOPHILS # BLD AUTO: 0.1 10E3/UL (ref 0–0.2)
BASOPHILS NFR BLD AUTO: 1 %
BILIRUB SERPL-MCNC: 0.3 MG/DL (ref 0.2–1.3)
BILIRUB UR QL STRIP: NEGATIVE
BUN SERPL-MCNC: 19 MG/DL (ref 7–30)
CALCIUM SERPL-MCNC: 8.2 MG/DL (ref 8.5–10.1)
CHLORIDE BLD-SCNC: 101 MMOL/L (ref 94–109)
CO2 SERPL-SCNC: 31 MMOL/L (ref 20–32)
COLOR UR AUTO: YELLOW
CREAT SERPL-MCNC: 1.02 MG/DL (ref 0.66–1.25)
EOSINOPHIL # BLD AUTO: 0.3 10E3/UL (ref 0–0.7)
EOSINOPHIL NFR BLD AUTO: 2 %
ERYTHROCYTE [DISTWIDTH] IN BLOOD BY AUTOMATED COUNT: 15.3 % (ref 10–15)
GFR SERPL CREATININE-BSD FRML MDRD: 81 ML/MIN/1.73M2
GLUCOSE BLD-MCNC: 110 MG/DL (ref 70–99)
GLUCOSE UR STRIP-MCNC: NEGATIVE MG/DL
HCT VFR BLD AUTO: 45.8 % (ref 40–53)
HGB BLD-MCNC: 15.1 G/DL (ref 13.3–17.7)
HGB UR QL STRIP: NEGATIVE
HOLD SPECIMEN: NORMAL
HOLD SPECIMEN: NORMAL
IMM GRANULOCYTES # BLD: 0.1 10E3/UL
IMM GRANULOCYTES NFR BLD: 1 %
KETONES UR STRIP-MCNC: NEGATIVE MG/DL
LACTATE SERPL-SCNC: 1.5 MMOL/L (ref 0.7–2)
LEUKOCYTE ESTERASE UR QL STRIP: NEGATIVE
LYMPHOCYTES # BLD AUTO: 1.3 10E3/UL (ref 0.8–5.3)
LYMPHOCYTES NFR BLD AUTO: 8 %
MCH RBC QN AUTO: 27.5 PG (ref 26.5–33)
MCHC RBC AUTO-ENTMCNC: 33 G/DL (ref 31.5–36.5)
MCV RBC AUTO: 83 FL (ref 78–100)
MONOCYTES # BLD AUTO: 0.9 10E3/UL (ref 0–1.3)
MONOCYTES NFR BLD AUTO: 5 %
MUCOUS THREADS #/AREA URNS LPF: PRESENT /LPF
NEUTROPHILS # BLD AUTO: 13.7 10E3/UL (ref 1.6–8.3)
NEUTROPHILS NFR BLD AUTO: 83 %
NITRATE UR QL: NEGATIVE
NRBC # BLD AUTO: 0 10E3/UL
NRBC BLD AUTO-RTO: 0 /100
PH UR STRIP: 7.5 [PH] (ref 5–7)
PLATELET # BLD AUTO: 187 10E3/UL (ref 150–450)
POTASSIUM BLD-SCNC: 4.3 MMOL/L (ref 3.4–5.3)
PROT SERPL-MCNC: 7.6 G/DL (ref 6.8–8.8)
RBC # BLD AUTO: 5.5 10E6/UL (ref 4.4–5.9)
RBC URINE: 2 /HPF
SARS-COV-2 RNA RESP QL NAA+PROBE: NEGATIVE
SODIUM SERPL-SCNC: 134 MMOL/L (ref 133–144)
SP GR UR STRIP: 1.02 (ref 1–1.03)
SQUAMOUS EPITHELIAL: 2 /HPF
UROBILINOGEN UR STRIP-MCNC: 4 MG/DL
WBC # BLD AUTO: 16.3 10E3/UL (ref 4–11)
WBC URINE: 7 /HPF

## 2021-08-09 PROCEDURE — 81001 URINALYSIS AUTO W/SCOPE: CPT | Performed by: EMERGENCY MEDICINE

## 2021-08-09 PROCEDURE — 87635 SARS-COV-2 COVID-19 AMP PRB: CPT | Performed by: EMERGENCY MEDICINE

## 2021-08-09 PROCEDURE — 87040 BLOOD CULTURE FOR BACTERIA: CPT | Performed by: EMERGENCY MEDICINE

## 2021-08-09 PROCEDURE — 250N000013 HC RX MED GY IP 250 OP 250 PS 637: Performed by: HOSPITALIST

## 2021-08-09 PROCEDURE — 120N000001 HC R&B MED SURG/OB

## 2021-08-09 PROCEDURE — 94640 AIRWAY INHALATION TREATMENT: CPT | Mod: 76

## 2021-08-09 PROCEDURE — 99285 EMERGENCY DEPT VISIT HI MDM: CPT | Mod: 25

## 2021-08-09 PROCEDURE — 250N000011 HC RX IP 250 OP 636: Performed by: EMERGENCY MEDICINE

## 2021-08-09 PROCEDURE — 96365 THER/PROPH/DIAG IV INF INIT: CPT

## 2021-08-09 PROCEDURE — 83605 ASSAY OF LACTIC ACID: CPT | Performed by: EMERGENCY MEDICINE

## 2021-08-09 PROCEDURE — 99207 PR CDG-CODE CATEGORY CHANGED: CPT | Performed by: HOSPITALIST

## 2021-08-09 PROCEDURE — 999N000157 HC STATISTIC RCP TIME EA 10 MIN

## 2021-08-09 PROCEDURE — 36415 COLL VENOUS BLD VENIPUNCTURE: CPT | Performed by: EMERGENCY MEDICINE

## 2021-08-09 PROCEDURE — 71045 X-RAY EXAM CHEST 1 VIEW: CPT

## 2021-08-09 PROCEDURE — 99222 1ST HOSP IP/OBS MODERATE 55: CPT | Mod: AI | Performed by: HOSPITALIST

## 2021-08-09 PROCEDURE — 80053 COMPREHEN METABOLIC PANEL: CPT | Performed by: EMERGENCY MEDICINE

## 2021-08-09 PROCEDURE — 250N000009 HC RX 250: Performed by: HOSPITALIST

## 2021-08-09 PROCEDURE — 250N000009 HC RX 250

## 2021-08-09 PROCEDURE — C9803 HOPD COVID-19 SPEC COLLECT: HCPCS

## 2021-08-09 PROCEDURE — 85025 COMPLETE CBC W/AUTO DIFF WBC: CPT | Performed by: EMERGENCY MEDICINE

## 2021-08-09 PROCEDURE — 258N000001 HC RX 258: Performed by: HOSPITALIST

## 2021-08-09 RX ORDER — ACETAMINOPHEN 650 MG/1
650 SUPPOSITORY RECTAL EVERY 6 HOURS PRN
Status: DISCONTINUED | OUTPATIENT
Start: 2021-08-09 | End: 2021-08-11 | Stop reason: HOSPADM

## 2021-08-09 RX ORDER — ALBUTEROL SULFATE 0.83 MG/ML
SOLUTION RESPIRATORY (INHALATION)
Status: COMPLETED
Start: 2021-08-09 | End: 2021-08-09

## 2021-08-09 RX ORDER — ACETAMINOPHEN 325 MG/1
650 TABLET ORAL EVERY 6 HOURS PRN
Status: DISCONTINUED | OUTPATIENT
Start: 2021-08-09 | End: 2021-08-11 | Stop reason: HOSPADM

## 2021-08-09 RX ORDER — LEVOTHYROXINE SODIUM 150 UG/1
150 TABLET ORAL DAILY
Status: DISCONTINUED | OUTPATIENT
Start: 2021-08-10 | End: 2021-08-10

## 2021-08-09 RX ORDER — ASPIRIN 81 MG/1
81 TABLET, CHEWABLE ORAL DAILY
Status: DISCONTINUED | OUTPATIENT
Start: 2021-08-10 | End: 2021-08-11 | Stop reason: HOSPADM

## 2021-08-09 RX ORDER — AMPICILLIN AND SULBACTAM 2; 1 G/1; G/1
3 INJECTION, POWDER, FOR SOLUTION INTRAMUSCULAR; INTRAVENOUS EVERY 6 HOURS
Status: DISCONTINUED | OUTPATIENT
Start: 2021-08-10 | End: 2021-08-11 | Stop reason: HOSPADM

## 2021-08-09 RX ORDER — ONDANSETRON 2 MG/ML
4 INJECTION INTRAMUSCULAR; INTRAVENOUS EVERY 6 HOURS PRN
Status: DISCONTINUED | OUTPATIENT
Start: 2021-08-09 | End: 2021-08-11 | Stop reason: HOSPADM

## 2021-08-09 RX ORDER — CALCIUM CARBONATE 1250 MG/5ML
1250 SUSPENSION ORAL 3 TIMES DAILY
Status: DISCONTINUED | OUTPATIENT
Start: 2021-08-09 | End: 2021-08-10

## 2021-08-09 RX ORDER — AMPICILLIN AND SULBACTAM 2; 1 G/1; G/1
3 INJECTION, POWDER, FOR SOLUTION INTRAMUSCULAR; INTRAVENOUS ONCE
Status: COMPLETED | OUTPATIENT
Start: 2021-08-09 | End: 2021-08-09

## 2021-08-09 RX ORDER — METOCLOPRAMIDE HYDROCHLORIDE 5 MG/5ML
10 SOLUTION ORAL
Status: DISCONTINUED | OUTPATIENT
Start: 2021-08-09 | End: 2021-08-10

## 2021-08-09 RX ORDER — LEVOTHYROXINE SODIUM 150 UG/1
150 TABLET ORAL DAILY
COMMUNITY
End: 2021-09-23

## 2021-08-09 RX ORDER — SENNOSIDES 8.6 MG
650 CAPSULE ORAL EVERY 8 HOURS PRN
Status: DISCONTINUED | OUTPATIENT
Start: 2021-08-09 | End: 2021-08-11 | Stop reason: HOSPADM

## 2021-08-09 RX ORDER — ALBUTEROL SULFATE 5 MG/ML
2.5 SOLUTION RESPIRATORY (INHALATION)
Status: DISCONTINUED | OUTPATIENT
Start: 2021-08-09 | End: 2021-08-10

## 2021-08-09 RX ORDER — SODIUM BICARBONATE 650 MG/1
650 TABLET ORAL DAILY
Status: DISCONTINUED | OUTPATIENT
Start: 2021-08-10 | End: 2021-08-11 | Stop reason: HOSPADM

## 2021-08-09 RX ORDER — PROCHLORPERAZINE 25 MG
25 SUPPOSITORY, RECTAL RECTAL EVERY 12 HOURS PRN
Status: DISCONTINUED | OUTPATIENT
Start: 2021-08-09 | End: 2021-08-11 | Stop reason: HOSPADM

## 2021-08-09 RX ORDER — CARBAMAZEPINE 100 MG/5ML
100 SUSPENSION ORAL DAILY
Status: DISCONTINUED | OUTPATIENT
Start: 2021-08-10 | End: 2021-08-10

## 2021-08-09 RX ORDER — ONDANSETRON 4 MG/1
4 TABLET, ORALLY DISINTEGRATING ORAL EVERY 6 HOURS PRN
Status: DISCONTINUED | OUTPATIENT
Start: 2021-08-09 | End: 2021-08-11 | Stop reason: HOSPADM

## 2021-08-09 RX ORDER — CARBAMAZEPINE 100 MG/5ML
150 SUSPENSION ORAL 3 TIMES DAILY
Status: DISCONTINUED | OUTPATIENT
Start: 2021-08-09 | End: 2021-08-11 | Stop reason: HOSPADM

## 2021-08-09 RX ORDER — ACETYLCYSTEINE 200 MG/ML
2 SOLUTION ORAL; RESPIRATORY (INHALATION) 4 TIMES DAILY
Status: DISCONTINUED | OUTPATIENT
Start: 2021-08-10 | End: 2021-08-11 | Stop reason: HOSPADM

## 2021-08-09 RX ADMIN — METOCLOPRAMIDE HYDROCHLORIDE 10 MG: 5 SOLUTION ORAL at 22:34

## 2021-08-09 RX ADMIN — HYDROCORTISONE 15 MG: 5 TABLET ORAL at 22:33

## 2021-08-09 RX ADMIN — CALCIUM CARBONATE 1250 MG: 1250 SUSPENSION ORAL at 22:33

## 2021-08-09 RX ADMIN — AMPICILLIN AND SULBACTAM 3 G: 2; 1 INJECTION, POWDER, FOR SOLUTION INTRAVENOUS at 20:20

## 2021-08-09 RX ADMIN — ALBUTEROL SULFATE: 2.5 SOLUTION RESPIRATORY (INHALATION) at 23:01

## 2021-08-09 RX ADMIN — DEXTROSE AND SODIUM CHLORIDE: 5; 450 INJECTION, SOLUTION INTRAVENOUS at 21:52

## 2021-08-09 RX ADMIN — CARBAMAZEPINE 150 MG: 100 SUSPENSION ORAL at 22:34

## 2021-08-09 RX ADMIN — ALBUTEROL SULFATE 2.5 MG: 2.5 SOLUTION RESPIRATORY (INHALATION) at 23:01

## 2021-08-09 NOTE — ED PROVIDER NOTES
History   Chief Complaint:  Fever     The history is provided by a relative (Mother). History limited by: Altered mental status.      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 Mother called EMS today as she recorded the patient to have a fever of 101.9 F. She gave him Tylenol at 1630 today. Overall, he is not at his baseline, cognitively or physically. She gave him his morning dose of Solu-Cortef of 15 mg and afternoon dose of 5 mg. When he devoted a fever this evening she gave an additional 20 mg.     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     Physical Exam     Patient Vitals for the past 24 hrs:   BP Temp Temp src Pulse Resp SpO2   08/09/21 2100 103/49 98.7  F (37.1  C) Oral 98 18 93 %   08/09/21 2030 103/58 -- -- 77 -- 92 %   08/09/21 1823 112/63 100  F (37.8  C) Temporal 87 18 90 %       Physical Exam     GENERAL:  Nonverbal.   HEAD: atraumatic  EYES: pupils reactive, extraocular muscles intact, conjunctivae normal  ENT:  mucus membranes moist  NECK:  trachea midline, normal range of motion  RESPIRATORY:  Course breath sounds at the right lung base.  CVS: normal S1/S2, no murmurs, intact distal pulses  ABDOMEN: soft, nontender, nondistention  MUSCULOSKELETAL: Contracture to both arms. no deformities.   SKIN: warm and dry, no acute rashes or ulceration  NEURO:  Eyes are open looking around the room. Not alert.   PSYCH:  Mood/affect normal.   Emergency Department Course     Imaging:  XR Chest Port 1 View  Mild patchy opacity in the right lung base, less severe  than 5/23/2021 could represent atelectasis or aspiration pneumonia.  Left lung is clear. No pleural effusion or pneumothorax. Normal heart size.  Reading per radiology    Laboratory:  CBC: WBC 16.3 (H), HGB 15.1,    CMP: Glucose 110 (H). Calcium 8.2 (L), Anion Gap 2 (L), o/w WNL (Creatinine: 1.02)    Lactic Acid: 1.5    UA with Microscopic: Appearance: Slightly Cloudy, pH: 7.5 (H), Protein Albumin: 30 (A), Urobilinogen: 4.0 (A), Bacteria: Moderate (A), Mucus: Present (A), Amorphous Crystals: Few (A), WBC: 7 (H), Squamous Epithelial: 2 (H)    Symptomatic Influenza SARS-COVIS-19 Virus PCR: Negative  Blood Culture x2: Pending    Emergency Department Course:    Reviewed:  I reviewed nursing notes, vitals, past medical history and care everywhere    Assessments:  1832 I obtained history and examined the patient as noted above.     Consults:   1946 I consulted with Dr. Lazcano, on call Hospitalist, regarding the patient's history and presentation here in the emergency department.    Interventions:  2020 Unasyn 3 g IV     Disposition:  The patient was admitted to the hospital under the care of Dr. Lazcano.     Impression & Plan     Medical Decision Making:  Keyon Farias is a 60 year old male who is well-known to the hospital with frequent admissions for aspiration pneumonia.  He has a GJ tube.  He has a fever and some coarse breath sounds in the right lung base.  Work-up shows slight elevated white count with a normal lactic acid and chest x-ray showing  right lower lobe infiltrate.  Blood culture is pending.  Covid is negative.  Patient had blood cultures obtained and was given Unasyn.  Spoke with the hospitalist regarding admission.      Covid-19  Keyon Farias was evaluated during a global COVID-19 pandemic, which necessitated consideration that the patient might be at risk for infection with the SARS-CoV-2 virus that causes COVID-19.   Applicable protocols for evaluation were followed during the patient's care. COVID-19 was considered as part of the patient's evaluation. The plan for testing is: a test was obtained during this visit.    Diagnosis:    ICD-10-CM    1. Aspiration pneumonia of right lower lobe due to gastric secretions (H)  J69.0        Scribe Disclosure:  I, Stefano Deleon, am serving as a scribe at 6:23 PM on 8/9/2021 to document services personally performed by Randy Avila MD based on my observations and the provider's statements to me.              Randy Avila MD  08/10/21 0116

## 2021-08-09 NOTE — ED TRIAGE NOTES
Pt's legal guardian and Mom called. She is aware he is here and wants him treated. She states he hasn't acted like himself today. Fever started this evening, Mom states it was 100.9. Mom gave him 1500mg tylenol at 1630. Mom states he's 'groggy' today. Pt got scopalamine through j tube around 1500. Mom states he has coughed 'a little bit, not that much'.

## 2021-08-09 NOTE — ED NOTES
Bed: ED10  Expected date:   Expected time:   Means of arrival:   Comments:  Kerri 1 Fever poss covid 60 m

## 2021-08-10 LAB
ANION GAP SERPL CALCULATED.3IONS-SCNC: <1 MMOL/L (ref 3–14)
BUN SERPL-MCNC: 16 MG/DL (ref 7–30)
CALCIUM SERPL-MCNC: 8.2 MG/DL (ref 8.5–10.1)
CHLORIDE BLD-SCNC: 103 MMOL/L (ref 94–109)
CO2 SERPL-SCNC: 30 MMOL/L (ref 20–32)
CREAT SERPL-MCNC: 0.95 MG/DL (ref 0.66–1.25)
ERYTHROCYTE [DISTWIDTH] IN BLOOD BY AUTOMATED COUNT: 15.4 % (ref 10–15)
GFR SERPL CREATININE-BSD FRML MDRD: 88 ML/MIN/1.73M2
GLUCOSE BLD-MCNC: 88 MG/DL (ref 70–99)
GLUCOSE BLDC GLUCOMTR-MCNC: 139 MG/DL (ref 70–99)
GLUCOSE BLDC GLUCOMTR-MCNC: 99 MG/DL (ref 70–99)
HCT VFR BLD AUTO: 43.2 % (ref 40–53)
HGB BLD-MCNC: 14 G/DL (ref 13.3–17.7)
MCH RBC QN AUTO: 27 PG (ref 26.5–33)
MCHC RBC AUTO-ENTMCNC: 32.4 G/DL (ref 31.5–36.5)
MCV RBC AUTO: 83 FL (ref 78–100)
PLATELET # BLD AUTO: 197 10E3/UL (ref 150–450)
POTASSIUM BLD-SCNC: 3.9 MMOL/L (ref 3.4–5.3)
RBC # BLD AUTO: 5.18 10E6/UL (ref 4.4–5.9)
SODIUM SERPL-SCNC: 133 MMOL/L (ref 133–144)
WBC # BLD AUTO: 13.7 10E3/UL (ref 4–11)

## 2021-08-10 PROCEDURE — 94640 AIRWAY INHALATION TREATMENT: CPT | Mod: 76

## 2021-08-10 PROCEDURE — 99232 SBSQ HOSP IP/OBS MODERATE 35: CPT | Performed by: INTERNAL MEDICINE

## 2021-08-10 PROCEDURE — 120N000001 HC R&B MED SURG/OB

## 2021-08-10 PROCEDURE — 999N000157 HC STATISTIC RCP TIME EA 10 MIN

## 2021-08-10 PROCEDURE — 250N000009 HC RX 250

## 2021-08-10 PROCEDURE — 250N000013 HC RX MED GY IP 250 OP 250 PS 637: Performed by: HOSPITALIST

## 2021-08-10 PROCEDURE — 250N000011 HC RX IP 250 OP 636: Performed by: HOSPITALIST

## 2021-08-10 PROCEDURE — 85018 HEMOGLOBIN: CPT | Performed by: HOSPITALIST

## 2021-08-10 PROCEDURE — 36415 COLL VENOUS BLD VENIPUNCTURE: CPT | Performed by: HOSPITALIST

## 2021-08-10 PROCEDURE — 250N000009 HC RX 250: Performed by: HOSPITALIST

## 2021-08-10 PROCEDURE — 80048 BASIC METABOLIC PNL TOTAL CA: CPT | Performed by: HOSPITALIST

## 2021-08-10 RX ORDER — LEVOTHYROXINE SODIUM 150 UG/1
150 TABLET ORAL DAILY
Status: DISCONTINUED | OUTPATIENT
Start: 2021-08-11 | End: 2021-08-11 | Stop reason: HOSPADM

## 2021-08-10 RX ORDER — DEXTROSE MONOHYDRATE 100 MG/ML
INJECTION, SOLUTION INTRAVENOUS CONTINUOUS PRN
Status: DISCONTINUED | OUTPATIENT
Start: 2021-08-10 | End: 2021-08-11 | Stop reason: HOSPADM

## 2021-08-10 RX ORDER — GUAR GUM
1 PACKET (EA) ORAL DAILY
Status: DISCONTINUED | OUTPATIENT
Start: 2021-08-10 | End: 2021-08-11 | Stop reason: HOSPADM

## 2021-08-10 RX ORDER — ALBUTEROL SULFATE 0.83 MG/ML
2.5 SOLUTION RESPIRATORY (INHALATION)
Status: DISCONTINUED | OUTPATIENT
Start: 2021-08-10 | End: 2021-08-11 | Stop reason: HOSPADM

## 2021-08-10 RX ORDER — ALBUTEROL SULFATE 0.83 MG/ML
SOLUTION RESPIRATORY (INHALATION)
Status: COMPLETED
Start: 2021-08-10 | End: 2021-08-10

## 2021-08-10 RX ORDER — METOCLOPRAMIDE HYDROCHLORIDE 5 MG/5ML
10 SOLUTION ORAL
Status: DISCONTINUED | OUTPATIENT
Start: 2021-08-10 | End: 2021-08-11 | Stop reason: HOSPADM

## 2021-08-10 RX ORDER — CALCIUM CARBONATE 1250 MG/5ML
1250 SUSPENSION ORAL 3 TIMES DAILY
Status: DISCONTINUED | OUTPATIENT
Start: 2021-08-10 | End: 2021-08-11 | Stop reason: HOSPADM

## 2021-08-10 RX ORDER — CARBAMAZEPINE 100 MG/5ML
100 SUSPENSION ORAL DAILY
Status: DISCONTINUED | OUTPATIENT
Start: 2021-08-10 | End: 2021-08-11 | Stop reason: HOSPADM

## 2021-08-10 RX ADMIN — AMPICILLIN AND SULBACTAM 3 G: 2; 1 INJECTION, POWDER, FOR SOLUTION INTRAMUSCULAR; INTRAVENOUS at 16:02

## 2021-08-10 RX ADMIN — ALBUTEROL SULFATE 2.5 MG: 2.5 SOLUTION RESPIRATORY (INHALATION) at 08:05

## 2021-08-10 RX ADMIN — CARBAMAZEPINE 150 MG: 100 SUSPENSION ORAL at 10:04

## 2021-08-10 RX ADMIN — CARBAMAZEPINE 150 MG: 100 SUSPENSION ORAL at 21:28

## 2021-08-10 RX ADMIN — CALCIUM CARBONATE 1250 MG: 1250 SUSPENSION ORAL at 16:04

## 2021-08-10 RX ADMIN — ACETYLCYSTEINE 2 ML: 200 SOLUTION ORAL; RESPIRATORY (INHALATION) at 08:05

## 2021-08-10 RX ADMIN — METOCLOPRAMIDE HYDROCHLORIDE 10 MG: 5 SOLUTION ORAL at 06:29

## 2021-08-10 RX ADMIN — ACETYLCYSTEINE 2 ML: 200 SOLUTION ORAL; RESPIRATORY (INHALATION) at 16:10

## 2021-08-10 RX ADMIN — HYDROCORTISONE 15 MG: 5 TABLET ORAL at 10:01

## 2021-08-10 RX ADMIN — METOCLOPRAMIDE HYDROCHLORIDE 10 MG: 5 SOLUTION ORAL at 11:59

## 2021-08-10 RX ADMIN — AMPICILLIN AND SULBACTAM 3 G: 2; 1 INJECTION, POWDER, FOR SOLUTION INTRAMUSCULAR; INTRAVENOUS at 09:59

## 2021-08-10 RX ADMIN — AMPICILLIN AND SULBACTAM 3 G: 2; 1 INJECTION, POWDER, FOR SOLUTION INTRAMUSCULAR; INTRAVENOUS at 02:55

## 2021-08-10 RX ADMIN — ALBUTEROL SULFATE 2.5 MG: 2.5 SOLUTION RESPIRATORY (INHALATION) at 16:11

## 2021-08-10 RX ADMIN — METOCLOPRAMIDE HYDROCHLORIDE 10 MG: 5 SOLUTION ORAL at 21:28

## 2021-08-10 RX ADMIN — BRIVARACETAM 100 MG: 10 SOLUTION ORAL at 10:03

## 2021-08-10 RX ADMIN — ACETYLCYSTEINE 2 ML: 200 SOLUTION ORAL; RESPIRATORY (INHALATION) at 12:20

## 2021-08-10 RX ADMIN — CARBAMAZEPINE 100 MG: 100 SUSPENSION ORAL at 19:18

## 2021-08-10 RX ADMIN — ASPIRIN 81 MG CHEWABLE TABLET 81 MG: 81 TABLET CHEWABLE at 10:00

## 2021-08-10 RX ADMIN — METOCLOPRAMIDE HYDROCHLORIDE 10 MG: 5 SOLUTION ORAL at 16:03

## 2021-08-10 RX ADMIN — ALBUTEROL SULFATE 2.5 MG: 2.5 SOLUTION RESPIRATORY (INHALATION) at 12:21

## 2021-08-10 RX ADMIN — Medication 1 PACKET: at 11:59

## 2021-08-10 RX ADMIN — ALBUTEROL SULFATE 2.5 MG: 2.5 SOLUTION RESPIRATORY (INHALATION) at 19:43

## 2021-08-10 RX ADMIN — PANTOPRAZOLE SODIUM 40 MG: 40 TABLET, DELAYED RELEASE ORAL at 10:05

## 2021-08-10 RX ADMIN — ACETYLCYSTEINE 2 ML: 200 SOLUTION ORAL; RESPIRATORY (INHALATION) at 19:28

## 2021-08-10 RX ADMIN — SODIUM BICARBONATE 650 MG TABLET 650 MG: at 10:00

## 2021-08-10 RX ADMIN — CARBAMAZEPINE 150 MG: 100 SUSPENSION ORAL at 16:04

## 2021-08-10 RX ADMIN — LEVOTHYROXINE SODIUM 150 MCG: 150 TABLET ORAL at 10:00

## 2021-08-10 RX ADMIN — CALCIUM CARBONATE 1250 MG: 1250 SUSPENSION ORAL at 21:28

## 2021-08-10 RX ADMIN — HYDROCORTISONE 15 MG: 5 TABLET ORAL at 21:28

## 2021-08-10 RX ADMIN — AMPICILLIN AND SULBACTAM 3 G: 2; 1 INJECTION, POWDER, FOR SOLUTION INTRAMUSCULAR; INTRAVENOUS at 21:22

## 2021-08-10 RX ADMIN — BRIVARACETAM 100 MG: 10 SOLUTION ORAL at 21:28

## 2021-08-10 RX ADMIN — HYDROCORTISONE 15 MG: 5 TABLET ORAL at 16:03

## 2021-08-10 ASSESSMENT — ACTIVITIES OF DAILY LIVING (ADL)
ADLS_ACUITY_SCORE: 44
ADLS_ACUITY_SCORE: 46
FALL_HISTORY_WITHIN_LAST_SIX_MONTHS: NO
PATIENT_/_FAMILY_COMMUNICATION_STYLE: SPOKEN LANGUAGE (ENGLISH OR BILINGUAL)
ADLS_ACUITY_SCORE: 46
ADLS_ACUITY_SCORE: 44

## 2021-08-10 NOTE — PROGRESS NOTES
Admission    Patient arrives to room 607-2 via cart from ED.    Inpatient nursing criteria listed below were met:    Did you put disposition on whiteboard and in sticky note: Yes  PCD's Documented: Yes  Skin issues/needs documented :Yes  Isolation education started/completed Yes  Patient allergies verified with patient: Yes  Verified completion of Centre Risk Assessment Tool:  Yes  Verified completion of Guardianship screening tool: Yes  Fall Prevention: Care plan updated, Education given and documented Yes  Care Plan initiated: Yes  Home medications documented in belongings flowsheet: NA  Patient belongings documented in belongings flowsheet: Yes  Reminder note (belongings/ medications) placed in discharge instructions:Yes  Admission profile/ required documentation complete: Yes  Bedside Report Letter given and explained to patient Yes  Visitor Designated? Yes  If patient is a 72 hour hold/Commitment are belongings removed from room and locked up? NA

## 2021-08-10 NOTE — CONSULTS
CLINICAL NUTRITION SERVICES  -  ASSESSMENT NOTE    RECOMMENDATIONS FOR MD/PROVIDER TO ORDER:   - Pt's mother gives Keyon 1/8 tsp salt BID (3pm and 12am). Defer sodium supplementation/replacement to MD.    Recommendations Ordered by Registered Dietitian (RD):   - Daily weights   - Start TF per home regimen     Type of Feeding Tube: GJ tube (feed into J-port)  Enteral Frequency:  Continuous  Enteral Regimen: Jevity 1.5 @ 55 mL/hr   Total Enteral Provisions: 1320ml/day will provide: 1980 kcals (26 kcal/kg), 84 g PRO (1.1 g/kg), 1003 ml free H20, 284 g CHO, and 27 g fiber daily.  Add 1 pkt Nutrisource Fiber daily (15 kcal, 4 g fiber)     Free Water Flush: 60 mL every 4 hrs while IVF running  Begin @ 30 ml/hr. Advance to 55 ml/hr after 4 hours.   Malnutrition:   % Weight Loss:  Unable to evaluate  % Intake:  Decreased intake does not meet criteria for malnutrition   Subcutaneous Fat Loss:  Deferred  Muscle Loss:  Deferred  Fluid Retention:  None noted    Malnutrition Diagnosis: Unable to determine due to lack of weight history.      REASON FOR ASSESSMENT  Keyon Farias is a 60 year old male seen by Registered Dietitian for Provider Order - Registered Dietitian to Assess and Order TF per Medical Nutrition Therapy Protocol    NUTRITION HISTORY  - Information obtained from chart review and phone call to patient's mother and guardian, Svaannah.   - She does not think he has lost weight. She is unsure what he currently weighs.   - No recent changes to enteral regimen reported.   - GJT exchange done 7/26    - Confirmed with Savannah current TF regimen:   Jevity 1.5 @ 55 ml/hr x 24 hours  Total 8711-0694 mL free water   1 Scoop Benefiber daily   1/8 tsp table salt BID (@ 3 pm & 12 am)    CURRENT NUTRITION ORDERS  Diet Order:     NPO     Current Intake/Tolerance:  N/A    NUTRITION FOCUSED PHYSICAL ASSESSMENT FOR DIAGNOSING MALNUTRITION)  No:  Deferred     Obtained from Chart/Interdisciplinary Team:  Last BM  "8/9    ANTHROPOMETRICS  Height: 5' 11\"  Weight: 80.1 kg - 5/24/21  Weight History:     No new weight on file to assess for changes.   Wt Readings from Last 10 Encounters:   05/24/21 80.1 kg (176 lb 8 oz)   04/16/21 76 kg (167 lb 8.8 oz)   02/22/21 74.8 kg (165 lb)   01/18/21 74.2 kg (163 lb 9.3 oz)   12/20/20 77.2 kg (170 lb 1.6 oz)   11/04/20 71 kg (156 lb 8.4 oz)   10/25/20 68.9 kg (151 lb 12.8 oz)   10/23/20 72.6 kg (160 lb)   10/14/20 73.5 kg (162 lb)   09/25/20 73.7 kg (162 lb 6.4 oz)       LABS  Labs reviewed    MEDICATIONS  Medications reviewed  Reglan 4x daily   D5 + NaCl IVF @ 75 mL/hr     ASSESSED NUTRITION NEEDS PER APPROVED PRACTICE GUIDELINES:  Dosing Weight 76 kg   Estimated Energy Needs: 0196-3477 kcals (25-30 Kcal/Kg)  Justification: maintenance  Estimated Protein Needs:  grams protein (1.2-1.5 g pro/Kg)  Justification: maintenance     MALNUTRITION:  % Weight Loss:  Unable to evaluate  % Intake:  Decreased intake does not meet criteria for malnutrition   Subcutaneous Fat Loss:  Deferred  Muscle Loss:  Deferred  Fluid Retention:  None noted    Malnutrition Diagnosis: Unable to determine due to lack of weight history.     NUTRITION DIAGNOSIS:  No nutrition diagnosis identified at this time    NUTRITION INTERVENTIONS  Recommendations / Nutrition Prescription  Type of Feeding Tube: GJ tube (feed into J-port)  Enteral Frequency:  Continuous  Enteral Regimen: Jevity 1.5 @ 55 mL/hr   Total Enteral Provisions: 1320ml/day will provide: 1980 kcals (26 kcal/kg), 84 g PRO (1.1 g/kg), 1003 ml free H20, 284 g CHO, and 27 g fiber daily.  Add 1 pkt Nutrisource Fiber daily (15 kcal, 4 g fiber)     Free Water Flush: 60 mL every 4 hrs while IVF running  Begin @ 30 ml/hr. Advance to 55 ml/hr after 4 hours.    Implementation  Nutrition education: Provided education on TF restart per home regimen.   EN Composition, EN Schedule and Feeding Tube Flush: as above      Nutrition Goals  TF @ goal to meet % estimated " needs.       MONITORING AND EVALUATION:  Progress towards goals will be monitored and evaluated per protocol and Practice Guidelines    Marisel Fernández RD, LD  [facility] [place], 68, 57, MH   Pager: [phone]  Weekend Pager: [phone]

## 2021-08-10 NOTE — PROGRESS NOTES
RECEIVING UNIT ED HANDOFF REVIEW    ED Nurse Handoff Report was reviewed by: Marina Piper RN on August 9, 2021 at 8:19 PM
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
## 2021-08-10 NOTE — CONSULTS
Care Management Initial Consult      Have conversed with pt's Mother (she is also his Legal Guardian) while she was here today.  She was given praise for pt not being readmitted as in the past.  Pt has the following servies:  Per Savannah, pt has the following services in place:  [facility] (phone [phone], fax [phone])  Approved for 12 hours RN coverage daily, but unable to get the help, so pt only has a 12 hr nurse on Wednesday and Thursday currently.  His mother fulfills the rest of the care    [facility] (12 hours PCA evening / night)   [facility]   For TF supplies  Savannah reported recent problems of having some days staffed by the nurse.  The PCA also lives with them, so does fill in extra to help Savannah, when there is no nursing available.      Pt has a [facility] [place].  Pt had recently established care with Endocrine from the [place], Dr Faizan Roth, Savannah was so impressed with how caring and detailed this physician was, that she now wants to change to a [facility] PCP.  Have put in a request with the [facility] for the [facility] for a virtual appointment.  His current home nurse service is through a [facility] waiver and they are unable to do labs.  If pt needs repeat labs post discharge, will need to look into skilled  RN for this.   Savannah noted they could also physically put pt into Savannah's vehicle.  This appears like it would be an added hardship for his caregiver.    Pt will need -[facility] stretcher transport at discharge and preferably AM hours.    Assessment completed with: Parents, Mother Savannah  Type of CM/SW Visit: Initial Assessment    Primary Care Provider verified and updated as needed: Yes (Savannah wants to change to a [facility] PCP)   Readmission within the last 30 days: no previous admission in last 30 days      Reason for Consult: care coordination/care conference, discharge planning  Advance Care Planning: Advance Care Planning  Reviewed: present on chart  Legal Guardian his Mother Savannah  General Information Comments: Pt is a total care pt with quadriplegia and TBI and is also aphasic    Communication Assessment  Patient's communication style: spoken language (English or Bilingual)    Hearing Difficulty or Deaf: no   Wear Glasses or Blind: no    Cognitive  Cognitive/Neuro/Behavioral: .WDL except, speech, orientation  Level of Consciousness: alert  Arousal Level: opens eyes spontaneously  Orientation: other (see comments) (HECTOR)  Mood/Behavior: calm, cooperative, behavior appropriate to situation     Speech: unable to speak    Living Environment:   People in home: parent(s) (Pt's night time PCA also lives wth them)     Current living Arrangements: house      Able to return to prior arrangements: yes  Living Arrangement Comments: Pt's mother would not allow pt to transition anywhere else but home    Family/Social Support:  Care provided by: parent(s), homecare agency  Provides care for: no one  Marital Status: Single             Description of Support System:           Current Resources:   Patient receiving home care services: Yes  Skilled Home Care Services:  (Pt is approved for 12 hr PCA and 12 hr nurse (24 care))  Community Resources: [facility], [facility], Financial/Insurance, Home Care, PCA, Transportation Services  Equipment currently used at home: hospital bed, lift device, wheelchair, manual, wheelchair, power  Supplies currently used at home: Incontinence Supplies, Enteral Nutrition & Supplies    Employment/Financial:  Employment Status: disabled        Financial Concerns: No concerns identified         Functional Status:  Prior to admission patient needed assistance:  yes        Assesssment of Functional Status: At functional baseline (Total care)    Mental Health Status:  Mental Health Status: No Current Concerns       Chemical Dependency Status:  Chemical Dependency Status: No Current Concerns              Values/Beliefs:  Spiritual, Cultural Beliefs, Oriental orthodox Practices, Values that affect care:     Brook Collins RN   Inpatient Care Management  [phone]
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
## 2021-08-10 NOTE — PHARMACY-ADMISSION MEDICATION HISTORY
Pharmacy Medication History  Admission medication history interview status for the 8/9/2021  admission is complete. See EPIC admission navigator for prior to admission medications     Location of Interview: Patient room  Medication history sources: Patient's family/friend (mother Savannah) and Surescripts    Significant changes made to the medication list:  Added levothyroxine, removed melatonin liquid 6 mg nightly (per mother, ineffective), changed scopolamine powder to PRN     In the past week, patient estimated taking medication this percent of the time: greater than 90%    Additional medication history information:   Mother states giving patient 1/8 teaspoon table salt twice daily (3 pm and HS) in addition to sodium bicarbonate tablet. Mother also has dose of brivaracetam available to administer PM dose 8/9/21 if needed.   Preferred discharge pharmacy is [facility] if short-course prescriptions only; otherwise [facility] [place] is preferred.     Medication reconciliation completed by provider prior to medication history? No    Time spent in this activity: 30 minutes     Prior to Admission medications    Medication Sig Last Dose Taking? Auth Provider   acetaminophen (TYLENOL 8 HOUR) 650 MG CR tablet Take 650 mg by mouth every 8 hours as needed for mild pain or fever 8/9/2021 at 1500 Yes Unknown, Entered By History   acetylcysteine (MUCOMYST) 20 % neb solution Take 2 mLs by nebulization 4 times daily With albuterol at 0700, 1100, 1500, and 1900  8/8/2021 at 0900 Yes Unknown, Entered By History   albuterol (PROVENTIL) (5 MG/ML) 0.5% neb solution Take 2.5 mg by nebulization every 4 hours (while awake) 0700 1100 1500 1900 with mucomyst  8/9/2021 at 1100 Yes Unknown, Entered By History   aspirin (ASA) 81 MG chewable tablet 81 mg by Oral or Feeding Tube route daily At 0900 8/9/2021 at 0900 Yes Unknown, Entered By History   bacitracin ointment Apply topically daily as needed for wound care To PEG  site.  8/8/2021 at Unknown time Yes Unknown, Entered By History   Brivaracetam (BRIVIACT) 10 MG/ML solution 100 mg by Oral or Feeding Tube route 2 times daily 0900, 2100 8/9/2021 at 0900 Yes Unknown, Entered By History   calcium carbonate 1250 MG/5ML SUSP suspension Take 1,250 mg by mouth 3 times daily 0900, 1500, 2100 8/9/2021 at 1500 Yes Unknown, Entered By History   carBAMazepine (TEGRETOL) 100 MG/5ML suspension Take 100 mg by mouth daily Take at 1800  8/9/2021 at 1800 Yes Unknown, Entered By History   carBAMazepine (TEGRETOL) 100 MG/5ML suspension 150 mg by Oral or Feeding Tube route 3 times daily At 06:00, 12:00, and 24:00 for seizures 8/9/2021 at 1200 Yes Unknown, Entered By History   clotrimazole-betamethasone (LOTRISONE) 1-0.05 % external cream Apply topically 2 times daily as needed  8/7/2021 at Unknown time Yes Leticia Santiago MD   Guar Gum (FIBER MODULAR, NUTRISOURCE FIBER,) packet 1 packet by Per Feeding Tube route daily 8/9/2021 at 0900 Yes Unknown, Entered By History   hydrocortisone (CORTEF) 5 MG tablet Take 15 mg (3 tablets) in the morning and 5 mg (1 tablet)  at 2:00 PM. During illness patient takes more as a stress dose. Please increase the dose as directed. 8/9/2021 at 1700 10 mg dose Yes Faizan Mclean MD   hydrocortisone 1 % CREA cream Place rectally 2 times daily as needed for other Apply to reddened memo areas as needed 8/8/2021 at Unknown time Yes Unknown, Entered By History   levothyroxine (SYNTHROID/LEVOTHROID) 150 MCG tablet Take 150 mcg by mouth daily 8/9/2021 at 0500 Yes Unknown, Entered By History   metoclopramide (REGLAN) 10 MG/10ML SOLN solution Take 10 mg by mouth 4 times daily (before meals and nightly) 0800, 1200, 1600, 2000  Disconnects bag before administration, then waits 45 mins before reconnecting after giving the medication 8/9/2021 at 1600 Yes Unknown, Entered By History   multivitamin, therapeutic (THERA-VIT) TABS tablet Take 1 tablet by mouth daily 8/9/2021 at 0900  Yes Reported, Patient   pantoprazole (PROTONIX) 2 mg/mL SUSP suspension 20 mLs (40 mg) by Per J Tube route daily 8/9/2021 at 0900 Yes Alvaro Barahona MD   potassium & sodium phosphates (NEUTRA-PHOS) 280-160-250 MG Packet Take 1 packet by mouth 3 times daily  8/9/2021 at 1500 Yes Yanely Liriano MD   Scopolamine HBr POWD Dispense #90. Mix contents with small amount of water for admin via J-tube.  Administer 0.8 mg three times each day.  Patient taking differently: Dispense #90. Mix contents with small amount of water for admin via J-tube.  Administer 0.8 mg three times each day as needed. 8/9/2021 at 1400 Yes Jennie Bermudez MD   Skin Protectants, Misc. (BALMEX SKIN PROTECTANT) OINT Externally apply topically 2 times daily as needed (irritation) Applay to reddened memo areas twice daily as needed Past Week at Unknown time Yes Unknown, Entered By History   sodium bicarbonate 650 MG tablet Take 1 tablet (650 mg) by mouth daily 8/9/2021 at 0900 Yes Ricky Torres MD   testosterone cypionate (DEPOTESTOSTERONE) 200 MG/ML injection Inject 0.3 mLs (60 mg) into the muscle every 7 days 8/6/2021 at Unknown time Yes Faizan Mclean MD   vitamin C (ASCORBIC ACID) 1000 MG TABS 1,000 mg by Oral or Feeding Tube route daily  8/9/2021 at 0900 Yes Unknown, Entered By History   vitamin D3 (CHOLECALCIFEROL) 2000 units (50 mcg) tablet Take 2,000 Units by mouth daily Crush and feed via j-tube @@ 0900 8/9/2021 at 0900 Yes Unknown, Entered By History   hydrocortisone sodium succinate PF (SOLU-CORTEF) 100 MG injection Inject 1 mL (50 mg) into the muscle once as needed (If unable to keep oral hydrocortisone due to vomiting.) Dispense Act-O-Vial More than a month at Unknown time  Faizan Mclean MD   miconazole (MICATIN) 2 % AERP powder Apply topically 2 times daily as needed  Unknown at Unknown time  Unknown, Entered By History   mupirocin (BACTROBAN) 2 % external ointment Apply topically 2 times daily as needed  Unknown at  Unknown time  Reported, Patient   prochlorperazine (COMPAZINE) 25 MG suppository Place 1 suppository (25 mg) rectally every 12 hours as needed for nausea or vomiting More than a month at Unknown time  Alvaro Barahona MD       The information provided in this note is only as accurate as the sources available at the time of update(s)   Karen Palomino, JustinD

## 2021-08-10 NOTE — PLAN OF CARE
Summary:   Admit from ED aspiration pneumonia  DATE & TIME: 8/9/21 9100-5088   Cognitive Concerns/ Orientation : HECTOR, pt nonverbal at baseline, alert   BEHAVIOR & AGGRESSION TOOL COLOR: Green   ABNL VS/O2: VSS on 2L  MOBILITY: Total turn & Repo  PAIN MANAGMENT: Absence of nonverbal indicators  DIET: NPO, nutrition consulted  BOWEL/BLADDER: Incont, small bowel movement overnight  ABNL LAB/BG: WBC 16.3  DRAIN/DEVICES: PIV infusing, int antibiotics  TELEMETRY RHYTHM: NA  SKIN: Scabs, reddened area on sacrum/coccyx small scabbed area on buttocks  TESTS/PROCEDURES: Chest x-ray suspicious for pneumonia  D/C DAY/GOALS/PLACE: Pending progress  OTHER IMPORTANT INFO: Guardian Savannah
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
## 2021-08-11 VITALS
TEMPERATURE: 98.5 F | OXYGEN SATURATION: 92 % | RESPIRATION RATE: 16 BRPM | HEART RATE: 57 BPM | DIASTOLIC BLOOD PRESSURE: 50 MMHG | WEIGHT: 173.72 LBS | BODY MASS INDEX: 24.23 KG/M2 | SYSTOLIC BLOOD PRESSURE: 106 MMHG

## 2021-08-11 LAB
ANION GAP SERPL CALCULATED.3IONS-SCNC: 1 MMOL/L (ref 3–14)
BUN SERPL-MCNC: 12 MG/DL (ref 7–30)
CALCIUM SERPL-MCNC: 9 MG/DL (ref 8.5–10.1)
CHLORIDE BLD-SCNC: 106 MMOL/L (ref 94–109)
CO2 SERPL-SCNC: 30 MMOL/L (ref 20–32)
CREAT SERPL-MCNC: 0.85 MG/DL (ref 0.66–1.25)
ERYTHROCYTE [DISTWIDTH] IN BLOOD BY AUTOMATED COUNT: 15.5 % (ref 10–15)
GFR SERPL CREATININE-BSD FRML MDRD: >90 ML/MIN/1.73M2
GLUCOSE BLD-MCNC: 107 MG/DL (ref 70–99)
HCT VFR BLD AUTO: 44.2 % (ref 40–53)
HGB BLD-MCNC: 14.1 G/DL (ref 13.3–17.7)
MCH RBC QN AUTO: 26.8 PG (ref 26.5–33)
MCHC RBC AUTO-ENTMCNC: 31.9 G/DL (ref 31.5–36.5)
MCV RBC AUTO: 84 FL (ref 78–100)
PHOSPHATE SERPL-MCNC: 2.5 MG/DL (ref 2.5–4.5)
PLATELET # BLD AUTO: 219 10E3/UL (ref 150–450)
POTASSIUM BLD-SCNC: 4 MMOL/L (ref 3.4–5.3)
RBC # BLD AUTO: 5.27 10E6/UL (ref 4.4–5.9)
SODIUM SERPL-SCNC: 137 MMOL/L (ref 133–144)
WBC # BLD AUTO: 9.5 10E3/UL (ref 4–11)

## 2021-08-11 PROCEDURE — 250N000011 HC RX IP 250 OP 636: Performed by: HOSPITALIST

## 2021-08-11 PROCEDURE — 99239 HOSP IP/OBS DSCHRG MGMT >30: CPT | Performed by: INTERNAL MEDICINE

## 2021-08-11 PROCEDURE — 80048 BASIC METABOLIC PNL TOTAL CA: CPT | Performed by: HOSPITALIST

## 2021-08-11 PROCEDURE — 250N000013 HC RX MED GY IP 250 OP 250 PS 637: Performed by: HOSPITALIST

## 2021-08-11 PROCEDURE — 85027 COMPLETE CBC AUTOMATED: CPT | Performed by: INTERNAL MEDICINE

## 2021-08-11 PROCEDURE — 84100 ASSAY OF PHOSPHORUS: CPT | Performed by: HOSPITALIST

## 2021-08-11 PROCEDURE — 94640 AIRWAY INHALATION TREATMENT: CPT | Mod: 76

## 2021-08-11 PROCEDURE — 36415 COLL VENOUS BLD VENIPUNCTURE: CPT | Performed by: HOSPITALIST

## 2021-08-11 PROCEDURE — 250N000009 HC RX 250: Performed by: HOSPITALIST

## 2021-08-11 PROCEDURE — 999N000157 HC STATISTIC RCP TIME EA 10 MIN

## 2021-08-11 RX ADMIN — ASPIRIN 81 MG CHEWABLE TABLET 81 MG: 81 TABLET CHEWABLE at 10:17

## 2021-08-11 RX ADMIN — METOCLOPRAMIDE HYDROCHLORIDE 10 MG: 5 SOLUTION ORAL at 10:24

## 2021-08-11 RX ADMIN — HYDROCORTISONE 15 MG: 5 TABLET ORAL at 16:15

## 2021-08-11 RX ADMIN — SODIUM BICARBONATE 650 MG TABLET 650 MG: at 10:16

## 2021-08-11 RX ADMIN — AMPICILLIN AND SULBACTAM 3 G: 2; 1 INJECTION, POWDER, FOR SOLUTION INTRAMUSCULAR; INTRAVENOUS at 14:48

## 2021-08-11 RX ADMIN — CALCIUM CARBONATE 1250 MG: 1250 SUSPENSION ORAL at 16:16

## 2021-08-11 RX ADMIN — AMPICILLIN AND SULBACTAM 3 G: 2; 1 INJECTION, POWDER, FOR SOLUTION INTRAMUSCULAR; INTRAVENOUS at 10:17

## 2021-08-11 RX ADMIN — ACETYLCYSTEINE 2 ML: 200 SOLUTION ORAL; RESPIRATORY (INHALATION) at 16:29

## 2021-08-11 RX ADMIN — ALBUTEROL SULFATE 2.5 MG: 2.5 SOLUTION RESPIRATORY (INHALATION) at 16:30

## 2021-08-11 RX ADMIN — METOCLOPRAMIDE HYDROCHLORIDE 10 MG: 5 SOLUTION ORAL at 06:57

## 2021-08-11 RX ADMIN — PANTOPRAZOLE SODIUM 40 MG: 40 TABLET, DELAYED RELEASE ORAL at 10:17

## 2021-08-11 RX ADMIN — AMPICILLIN AND SULBACTAM 3 G: 2; 1 INJECTION, POWDER, FOR SOLUTION INTRAMUSCULAR; INTRAVENOUS at 02:18

## 2021-08-11 RX ADMIN — ACETYLCYSTEINE 2 ML: 200 SOLUTION ORAL; RESPIRATORY (INHALATION) at 08:31

## 2021-08-11 RX ADMIN — CALCIUM CARBONATE 1250 MG: 1250 SUSPENSION ORAL at 10:17

## 2021-08-11 RX ADMIN — CARBAMAZEPINE 150 MG: 100 SUSPENSION ORAL at 10:17

## 2021-08-11 RX ADMIN — LEVOTHYROXINE SODIUM 150 MCG: 150 TABLET ORAL at 07:00

## 2021-08-11 RX ADMIN — ALBUTEROL SULFATE 2.5 MG: 2.5 SOLUTION RESPIRATORY (INHALATION) at 08:32

## 2021-08-11 RX ADMIN — ACETYLCYSTEINE 2 ML: 200 SOLUTION ORAL; RESPIRATORY (INHALATION) at 11:01

## 2021-08-11 RX ADMIN — METOCLOPRAMIDE HYDROCHLORIDE 10 MG: 5 SOLUTION ORAL at 16:15

## 2021-08-11 RX ADMIN — BRIVARACETAM 100 MG: 10 SOLUTION ORAL at 10:17

## 2021-08-11 RX ADMIN — ALBUTEROL SULFATE 2.5 MG: 2.5 SOLUTION RESPIRATORY (INHALATION) at 11:01

## 2021-08-11 RX ADMIN — HYDROCORTISONE 15 MG: 5 TABLET ORAL at 10:16

## 2021-08-11 ASSESSMENT — ACTIVITIES OF DAILY LIVING (ADL)
ADLS_ACUITY_SCORE: 46
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
## 2021-08-11 NOTE — PLAN OF CARE
Summary: aspiration pneumonia  DATE & TIME: 8/10/21 1218-6684   Cognitive Concerns/ Orientation : HECTOR, pt nonverbal at baseline, alert   BEHAVIOR & AGGRESSION TOOL COLOR: Green   ABNL VS/O2: VSS on RA, in the late adarsh desats to 88% and 1L NC added, occ congested fair cough    MOBILITY: Total turn & Repo. lift  PAIN MANAGMENT: Absence of nonverbal indicators  DIET: NPO, TF at goal rate 55ml/hr, flushes q4h 60ml  BOWEL/BLADDER: Incont of both   ABNL LAB/BG: WBC 16.3->13.7  DRAIN/DEVICES: PIV, IV antibiotics  TELEMETRY RHYTHM: NA  SKIN: Scabs, reddened area on sacrum/coccyx small scabbed area on buttocks, skin tear R upper/inner thigh   TESTS/PROCEDURES:   D/C DAY/GOALS/PLACE: poss back home tomorrow   OTHER IMPORTANT INFO: Guardian Savannah-mother; visited today and hoping pt will be home for his BD tomorrow

## 2021-08-11 NOTE — PLAN OF CARE
Summary: aspiration pneumonia  DATE & TIME: 8/10/21 4489-2410  Cognitive Concerns/ Orientation : HECTOR, pt nonverbal at baseline, alert   BEHAVIOR & AGGRESSION TOOL COLOR: Green   ABNL VS/O2: VSS on RA, in the late adarsh desats to 88% and 1L NC added, occ congested fair cough    MOBILITY: Total turn & Repo. lift  PAIN MANAGMENT: Absence of nonverbal indicators  DIET: NPO, TF at goal rate 55ml/hr, flushes q4h 60ml  BOWEL/BLADDER: Incont of both   ABNL LAB/BG: WBC 16.3->13.7  DRAIN/DEVICES: PIV, IV antibiotics  TELEMETRY RHYTHM: NA  SKIN: Scabs, reddened area on sacrum/coccyx small scabbed area on buttocks, skin tear R upper/inner thigh   TESTS/PROCEDURES:   D/C DAY/GOALS/PLACE: poss back home today  OTHER IMPORTANT INFO: Guardian Savannah-mother;  hoping pt will be home for his BD today

## 2021-08-11 NOTE — DISCHARGE INSTRUCTIONS
You have requested to establish care at [facility].  On this discharge, you will notice two appointments scheduled.  There is a virtual visit on 8/16/21 and an in person appointment on 8/19/21 for hospital follow- up and to establish primary care.  If Keyon is doing well and you are confident he will be able to get to the in person appointment scheduled on 8/19/21, please cancel the virtual appointment by calling [phone]        Nutrition Discharge Instructions-   Restart usual enteral regimen once home. Jevity 1.5 @ 55 ml/hr x 24 hours. Total free water 8535-1302 mL, split.

## 2021-08-11 NOTE — PLAN OF CARE
Discharge    Patient discharged to home via stretcher with transport.    Cognitive Concerns/ Orientation : HECTOR, pt nonverbal at baseline, alert   BEHAVIOR & AGGRESSION TOOL COLOR: Green   ABNL VS/O2: VSS on RA, 92%  MOBILITY: Total turn & Repo. lift  PAIN MANAGMENT: scoring 0 for rFLACC  DIET: NPO, TF at goal rate 55 ml/hr, flushes q4h 60ml  BOWEL/BLADDER: Incont of both   ABNL LAB/BG: WBC 9.5 (13.7 yest.)  DRAIN/DEVICES: PIV, IV antibiotics  TELEMETRY RHYTHM: NA  SKIN: Scabs, reddened area on sacrum/coccyx small scabbed area on buttocks, skin tear R upper/inner thigh   TESTS/PROCEDURES:   D/C DAY/GOALS/PLACE: 1630 ride scheduled. Transportation arrival delayed to 1745. AVS reviewed with mother with teach back. She denies questions and verbalizes understanding.     Listed belongings gathered and returned to patient. Yes  Belongings returned to patient from security/pharmacy No  Care Plan and Patient education resolved: Yes  Prescriptions if needed, hard copies sent with patient  Yes  Home and hospital acquired medications returned to patient: NA  Medication Bin checked and emptied on discharge Yes  Follow up appointment made for patient: Yes, see CC notes.   PALPITATIONS

## 2021-08-11 NOTE — PROGRESS NOTES
Received message that mother requested RN callback. I did call her back and she requested that Mr Farias be dressed in scrubs at discharge.     She was driving and unable to review AVS. Will send copies of AVS with transportation. Medication req is due to be completed.     Currently, transportation has been established by the CC and scheduled for 1630 today.

## 2021-08-11 NOTE — PROGRESS NOTES
Care Management Discharge Note    Discharge Date: 08/11/2021  Expected Time of Departure: 8/12/21 AM    Discharge Disposition: Home    Discharge Services: Transportation services, resume home care services  Discharge DME: No new DME needed    Discharge Transportation: [employer]  Private pay costs discussed: Not applicable    PAS Confirmation Code:  NA  Patient/family educated on Medicare website which has current facility and service quality ratings: no    Education Provided on the Discharge Plan:  yes  Persons Notified of Discharge Plans: Pt's mother and Guardian is notified and in agreement  Patient/Family in Agreement with the Plan: yes    Handoff Referral Completed: Yes    Additional Information:  New PCP [facility] Jesus Manning is scheduled for 8/19/21.  This appointment needed to be in person.  Pt's mother is confident with the help of his home care nurse, that she will be able to get pt to this appointment.  [facility]  Pt's mother will cancel this if Keyon is doing well and remains confident she can get Keyon to his appointment on 8/19/21.  She is aware initial new pt appointments need to be in person.    Transportation is set-up for 4:30 PM today.  This was the earliest available.    Will fax orders to his home care agency when orders for discharge are completed.        Karen Collins, RN   Inpatient Care Management  [phone]

## 2021-08-12 ENCOUNTER — PATIENT OUTREACH (OUTPATIENT)
Dept: CARE COORDINATION | Facility: CLINIC | Age: 61
End: 2021-08-12

## 2021-08-12 DIAGNOSIS — Z71.89 OTHER SPECIFIED COUNSELING: ICD-10-CM
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
## 2021-08-12 NOTE — PROGRESS NOTES
Clinic Care Coordination Note  All TCM discharge questions answered by Keyon's Legal Guardian  His Mother Savannah Oh Kimmy  Care Transitions Assistant  [facility]

## 2021-08-14 LAB
BACTERIA BLD CULT: NO GROWTH
BACTERIA BLD CULT: NO GROWTH

## 2021-08-19 ENCOUNTER — OFFICE VISIT (OUTPATIENT)
Dept: FAMILY MEDICINE | Facility: CLINIC | Age: 61
End: 2021-08-19
Payer: MEDICARE

## 2021-08-19 VITALS
DIASTOLIC BLOOD PRESSURE: 76 MMHG | HEART RATE: 84 BPM | SYSTOLIC BLOOD PRESSURE: 109 MMHG | BODY MASS INDEX: 24.23 KG/M2 | RESPIRATION RATE: 16 BRPM | HEIGHT: 71 IN | TEMPERATURE: 97.7 F

## 2021-08-19 DIAGNOSIS — G40.909 RECURRENT SEIZURES (H): ICD-10-CM

## 2021-08-19 DIAGNOSIS — S06.9X9S TRAUMATIC BRAIN INJURY WITH LOSS OF CONSCIOUSNESS, SEQUELA (H): ICD-10-CM

## 2021-08-19 DIAGNOSIS — E23.0 PANHYPOPITUITARISM (H): ICD-10-CM

## 2021-08-19 DIAGNOSIS — R13.10 DYSPHAGIA, UNSPECIFIED TYPE: ICD-10-CM

## 2021-08-19 DIAGNOSIS — J69.0 ASPIRATION PNEUMONITIS (H): Primary | ICD-10-CM

## 2021-08-19 PROCEDURE — 99495 TRANSJ CARE MGMT MOD F2F 14D: CPT | Performed by: INTERNAL MEDICINE

## 2021-08-19 NOTE — PROGRESS NOTES
Assessment & Plan     Aspiration pneumonitis (H)  Seems to responding well to antibiotics, but fatigue and low energy can persist after completion of antibiotics for several weeks; explained to mother     Traumatic brain injury with loss of consciousness, sequela (H)      Panhypopituitarism (H)  Sees an endocrinologist for this; back on home hydrocortisone dose with normal blood pressure today     Recurrent seizures (H)  Stable     Dysphagia, unspecified type  On tube feeds    She could try some topical antifungals for the seborrheic dermatitis on the face and chest         32 minutes spent on the date of the encounter doing chart review, history and exam, documentation and further activities per the note           No follow-ups on file.    Xavier Daily MD  [facility] PERNELL Ta is a 61 year old who presents for the following health issues  accompanied by his mother:    Hospitals in [place]       Hospital Follow-up Visit:    Hospital/Nursing Home/IP Rehab Facility: [facility]  Date of Admission: 08/09/2021  Date of Discharge: 08/11/2021   Reason(s) for Admission: Aspiration pneumonia of right lower lobe due to gastric secretions (H)        Was your hospitalization related to COVID-19? No   Problems taking medications regularly:  None  Medication changes since discharge: None  Problems adhering to non-medication therapy:  None    Summary of hospitalization:  [facility] discharge summary reviewed  Diagnostic Tests/Treatments reviewed.  Follow up needed: none  Other Healthcare Providers Involved in Patient s Care:         None  Update since discharge: improved. Post Discharge Medication Reconciliation: discharge medications reconciled and changed, per note/orders.  Plan of care communicated with patient and family          No cough or fever since discharge, but energy levels are not the same   Some decline in overall state over last 3 years according to  "mother  She is discouraged that he has not been able to take PO for several months       Review of Systems         Objective    /76 (BP Location: Left arm, Patient Position: Sitting, Cuff Size: Adult Regular)   Pulse 84   Temp 97.7  F (36.5  C) (Temporal)   Resp 16   Ht 1.803 m (5' 11\")   BMI 24.23 kg/m    Body mass index is 24.23 kg/m .  Physical Exam   GENERAL: healthy, alert and no distress  RESP: lungs clear to auscultation - breathing not labored   CV: regular rate and rhythm, trace bilateral lower extremity edema noted   ABDOMEN: soft, nontender, no hepatosplenomegaly, no masses and bowel sounds normal; PEG tube in place   NEURO:  Non-verbal, moves all extremities, rather sleepy this afternoon   MENTAL STATUS:  His a little sleepy, well groomed, no distress   SKIN:  Erythema and scale in skin folds of face and on chest and hair line              "

## 2021-08-19 NOTE — PATIENT INSTRUCTIONS
Try over the counter 1% clotrimazole cream applied to red scaly rash on face and chest twice daily.  The rash is called seborrheic dermatitis and it is related to dandruff (not dangerous).

## 2021-08-24 ENCOUNTER — HOSPITAL ENCOUNTER (EMERGENCY)
Facility: CLINIC | Age: 61
Discharge: HOME | End: 2021-08-24
Attending: EMERGENCY MEDICINE | Admitting: EMERGENCY MEDICINE
Payer: MEDICARE

## 2021-08-24 ENCOUNTER — APPOINTMENT (OUTPATIENT)
Dept: GENERAL RADIOLOGY | Facility: CLINIC | Age: 61
End: 2021-08-24
Attending: EMERGENCY MEDICINE
Payer: MEDICARE

## 2021-08-24 VITALS
RESPIRATION RATE: 17 BRPM | DIASTOLIC BLOOD PRESSURE: 71 MMHG | TEMPERATURE: 98.8 F | OXYGEN SATURATION: 98 % | SYSTOLIC BLOOD PRESSURE: 114 MMHG | HEART RATE: 78 BPM

## 2021-08-24 DIAGNOSIS — M62.81 GENERALIZED MUSCLE WEAKNESS: ICD-10-CM

## 2021-08-24 LAB
ALBUMIN SERPL-MCNC: 3.3 G/DL (ref 3.4–5)
ALBUMIN UR-MCNC: NEGATIVE MG/DL
ALP SERPL-CCNC: 110 U/L (ref 40–150)
ALT SERPL W P-5'-P-CCNC: 27 U/L (ref 0–70)
ANION GAP SERPL CALCULATED.3IONS-SCNC: 5 MMOL/L (ref 3–14)
APPEARANCE UR: CLEAR
AST SERPL W P-5'-P-CCNC: 20 U/L (ref 0–45)
ATRIAL RATE - MUSE: 59 BPM
BASOPHILS # BLD AUTO: 0.1 10E3/UL (ref 0–0.2)
BASOPHILS NFR BLD AUTO: 1 %
BILIRUB DIRECT SERPL-MCNC: 0.2 MG/DL (ref 0–0.2)
BILIRUB SERPL-MCNC: 0.4 MG/DL (ref 0.2–1.3)
BILIRUB UR QL STRIP: NEGATIVE
BUN SERPL-MCNC: 13 MG/DL (ref 7–30)
CALCIUM SERPL-MCNC: 8.7 MG/DL (ref 8.5–10.1)
CHLORIDE BLD-SCNC: 103 MMOL/L (ref 94–109)
CO2 SERPL-SCNC: 27 MMOL/L (ref 20–32)
COLOR UR AUTO: YELLOW
CREAT SERPL-MCNC: 0.96 MG/DL (ref 0.66–1.25)
DIASTOLIC BLOOD PRESSURE - MUSE: NORMAL MMHG
EOSINOPHIL # BLD AUTO: 0.5 10E3/UL (ref 0–0.7)
EOSINOPHIL NFR BLD AUTO: 6 %
ERYTHROCYTE [DISTWIDTH] IN BLOOD BY AUTOMATED COUNT: 15.6 % (ref 10–15)
GFR SERPL CREATININE-BSD FRML MDRD: 86 ML/MIN/1.73M2
GLUCOSE BLD-MCNC: 86 MG/DL (ref 70–99)
GLUCOSE UR STRIP-MCNC: NEGATIVE MG/DL
HCT VFR BLD AUTO: 45.8 % (ref 40–53)
HGB BLD-MCNC: 14.7 G/DL (ref 13.3–17.7)
HGB UR QL STRIP: NEGATIVE
HOLD SPECIMEN: NORMAL
IMM GRANULOCYTES # BLD: 0 10E3/UL
IMM GRANULOCYTES NFR BLD: 0 %
INTERPRETATION ECG - MUSE: NORMAL
KETONES UR STRIP-MCNC: NEGATIVE MG/DL
LACTATE SERPL-SCNC: 1.4 MMOL/L (ref 0.7–2)
LEUKOCYTE ESTERASE UR QL STRIP: NEGATIVE
LYMPHOCYTES # BLD AUTO: 2.4 10E3/UL (ref 0.8–5.3)
LYMPHOCYTES NFR BLD AUTO: 32 %
MCH RBC QN AUTO: 26.7 PG (ref 26.5–33)
MCHC RBC AUTO-ENTMCNC: 32.1 G/DL (ref 31.5–36.5)
MCV RBC AUTO: 83 FL (ref 78–100)
MONOCYTES # BLD AUTO: 0.6 10E3/UL (ref 0–1.3)
MONOCYTES NFR BLD AUTO: 8 %
NEUTROPHILS # BLD AUTO: 4 10E3/UL (ref 1.6–8.3)
NEUTROPHILS NFR BLD AUTO: 53 %
NITRATE UR QL: NEGATIVE
NRBC # BLD AUTO: 0 10E3/UL
NRBC BLD AUTO-RTO: 0 /100
P AXIS - MUSE: 64 DEGREES
PH UR STRIP: 7.5 [PH] (ref 5–7)
PLATELET # BLD AUTO: 137 10E3/UL (ref 150–450)
POTASSIUM BLD-SCNC: 3.9 MMOL/L (ref 3.4–5.3)
PR INTERVAL - MUSE: 168 MS
PROCALCITONIN SERPL-MCNC: <0.05 NG/ML
PROT SERPL-MCNC: 7.4 G/DL (ref 6.8–8.8)
QRS DURATION - MUSE: 94 MS
QT - MUSE: 422 MS
QTC - MUSE: 417 MS
R AXIS - MUSE: -33 DEGREES
RBC # BLD AUTO: 5.5 10E6/UL (ref 4.4–5.9)
RBC URINE: 1 /HPF
SARS-COV-2 RNA RESP QL NAA+PROBE: NEGATIVE
SODIUM SERPL-SCNC: 135 MMOL/L (ref 133–144)
SP GR UR STRIP: 1.02 (ref 1–1.03)
SYSTOLIC BLOOD PRESSURE - MUSE: NORMAL MMHG
T AXIS - MUSE: 43 DEGREES
UROBILINOGEN UR STRIP-MCNC: 2 MG/DL
VENTRICULAR RATE- MUSE: 59 BPM
WBC # BLD AUTO: 7.4 10E3/UL (ref 4–11)
WBC URINE: 0 /HPF

## 2021-08-24 PROCEDURE — C9803 HOPD COVID-19 SPEC COLLECT: HCPCS

## 2021-08-24 PROCEDURE — 36415 COLL VENOUS BLD VENIPUNCTURE: CPT | Performed by: EMERGENCY MEDICINE

## 2021-08-24 PROCEDURE — 84145 PROCALCITONIN (PCT): CPT | Performed by: EMERGENCY MEDICINE

## 2021-08-24 PROCEDURE — 81001 URINALYSIS AUTO W/SCOPE: CPT | Performed by: EMERGENCY MEDICINE

## 2021-08-24 PROCEDURE — 87635 SARS-COV-2 COVID-19 AMP PRB: CPT | Performed by: EMERGENCY MEDICINE

## 2021-08-24 PROCEDURE — 80053 COMPREHEN METABOLIC PANEL: CPT | Performed by: EMERGENCY MEDICINE

## 2021-08-24 PROCEDURE — 96361 HYDRATE IV INFUSION ADD-ON: CPT

## 2021-08-24 PROCEDURE — 82248 BILIRUBIN DIRECT: CPT | Performed by: EMERGENCY MEDICINE

## 2021-08-24 PROCEDURE — 99285 EMERGENCY DEPT VISIT HI MDM: CPT | Mod: 25

## 2021-08-24 PROCEDURE — 258N000003 HC RX IP 258 OP 636: Performed by: EMERGENCY MEDICINE

## 2021-08-24 PROCEDURE — 93005 ELECTROCARDIOGRAM TRACING: CPT

## 2021-08-24 PROCEDURE — 71046 X-RAY EXAM CHEST 2 VIEWS: CPT

## 2021-08-24 PROCEDURE — 96360 HYDRATION IV INFUSION INIT: CPT

## 2021-08-24 PROCEDURE — 83605 ASSAY OF LACTIC ACID: CPT | Performed by: EMERGENCY MEDICINE

## 2021-08-24 PROCEDURE — 85025 COMPLETE CBC W/AUTO DIFF WBC: CPT | Performed by: EMERGENCY MEDICINE

## 2021-08-24 RX ADMIN — SODIUM CHLORIDE 1000 ML: 9 INJECTION, SOLUTION INTRAVENOUS at 18:44

## 2021-08-24 NOTE — ED PROVIDER NOTES
History     Chief Complaint:  Generalized Weakness and Altered Mental Status    The history is provided by the EMS personnel. The history is limited by the condition of the patient.      Keyon Farias is a 61 year old male with a history of TBI, septic shock, cardiac arrest, and cerebral artery occulusion with infarction, who presents for evaluation of generalized weakness and altered mental status via EMS. Per EMS report, the patient lives at home with his mother and she is his primary caregiver. He has been becoming increasingly weak, lethargic, and altered over the past few days, which worsened today. The patient's mother has also apparently noticed fevers at home. She was concerned it possibly could be related to his sodium level so she gave him 1.5 tsp salt with water but there was no improvement.     Review of Systems   Unable to perform ROS: Mental status change (& nonverbal)       Allergies:  Valproic Acid  Dilantin   Scopolamine    Medications:  acetylcysteine  albuterol   aspirin   Brivaracetam   calcium carbonate   carbamazepine   hydrocortisone sodium succinate   levothyroxine  metoclopramide   pantoprazole   potassium & sodium phosphates   prochlorperazine  sodium bicarbonate   testosterone cypionate     Past Medical History:    Aphasia  TBI   DVT of upper extremity  GERD    Pneumonia   Seizures   Sepsis  Septic shock   Spastic hemiplegia affecting dominant side    Thyroid disease   Tracheostomy care     cerebral artery occlusion with cerebral infarction   UTI   Ventricular fibrillation   Dysphagia  Transient alteration of awareness  Aspiration pneumonitis  Encephalopathy  Necrotizing pancreatitis  Cardiac arrest  Acute respiratory failure with hypoxia  Hyponatremia  Panhypopituitarism    Past Surgical History:    Endoscopy GI US   EGD, necrosectomy   reconstructive facial surgery  IR gastro jejunostomy tube   PICC IR   Appendectomy  Right hand repair   tracheostomy   Vascular surgery    Family  History:    Father: cancer     Social History:  The patient was accompanied to the ED by EMS.  Patient lives at home with mother.    Physical Exam     Patient Vitals for the past 24 hrs:   BP Temp Temp src Pulse Resp SpO2   08/24/21 1934 -- 98.8  F (37.1  C) Temporal -- -- --   08/24/21 1822 -- -- -- 62 17 96 %   08/24/21 1821 126/66 -- -- 66 14 --     Physical Exam  Nursing note and vitals reviewed.  Constitutional:  Awake but nonverbal.  HENT:   Nose:    Nose normal.   Mouth/Throat:   Mucous membranes are normal.   Eyes:    Conjunctivae normal and EOM are normal.      Pupils are equal, round, and reactive to light.   Neck:    Trachea normal.   Cardiovascular:  Normal rate, regular rhythm, normal heart sounds and normal pulses. No murmur heard.  Pulmonary/Chest:  Effort normal and breath sounds normal.   Abdominal:   Soft. Normal appearance and bowel sounds are normal.      There is no apparent tenderness.      There is no rebound and no CVA tenderness.   Musculoskeletal:  Extremities atraumatic x 4.   Lymphadenopathy:  No cervical adenopathy.   Neurological:   Awake but nonverbal. Normal strength.      No cranial nerve deficit or sensory deficit. GCS eye subscore is 4. GCS verbal subscore is 5. GCS motor subscore is 6.   Skin:    Skin is intact. No rash noted.   Psychiatric:   Nonverbal.    Emergency Department Course     ECG:  ECG taken at 1902, ECG read at 1908  Sinus bradycardia   Left axis deviation   Abnormal ECG  No significant change compared to EKG dated 5/23/21  Rate 59 bpm. UT interval 168 ms. QRS duration 94 ms. QT/QTc 422/417 ms. P-R-T axes 64 -33 43.    Imaging:    XR Chest 2 Views  Persistent low lung volumes with asymmetric elevation of  the right hemidiaphragm. Mild bibasilar opacities may reflect  atelectasis or infiltrates, new on the left and stable on the right.  Newly visualized small right basilar nodular opacity. Recommend  attention on follow-up. No definite pleural effusion. Stable  heart  size.  RODNEY WALTON MD   Reading per radiology     Laboratory:    Asymptomatic COVID-19 Virus (Coronavirus) by PCR Nasopharyngeal swab: Negative      CBC: WBC 7.4, HGB 14.7,  (L)  CMP: Albumin 3.3 (L), o/w WNL (Creatinine 0.96)    Lactic Acid (Resulted: 1858): 1.4   Procalcitonin: <0.05     Bilirubin Direct: 0.2     UA: WNL    Emergency Department Course:    Reviewed:    I reviewed the patient's nursing notes, vitals, past medical records, Care Everywhere.     Assessments:    1830 I performed an exam of the patient as documented above.     Interventions:  1844 NS 1000 mL IV     Disposition:  The patient was discharged to home.     Impression & Plan      Medical Decision Making:  Keyon Farias is a 61 year old male who presents to the emergency department today for evaluation of generalized weakness and being more sleepy.  He does not have a fever here, and his vital signs are normal.  I felt it was reasonable to proceed with the above work-up including the blood work, urine, and chest x-ray, along with a Covid swab.  His blood work is all unremarkable including his lactic acid and procalcitonin.  His chest x-ray does show the possibility of an infiltrate or atelectasis, and I suspect it is atelectasis with a normal lactic acid, procalcitonin, and WBC.  His electrolytes and specifically his sodium level are normal as well.  At this point I feel that he is safe for discharge with close outpatient follow-up.  His mother is comfortable with that as well.  She knows to have him reevaluated immediately with any concerns or worsening symptoms as well.    Diagnosis:    ICD-10-CM    1. Generalized muscle weakness  M62.81      Discharge Medications:  New Prescriptions    No medications on file     Scribe Disclosure:  I, Orla Severson, am serving as a scribe at 6:29 PM on 8/24/2021 to document services personally performed by Fam Farnsworth MD based on my observations and the provider's statements to  [facility] EMERGENCY DEPT         Fam Farnsworth MD  08/24/21 3336       Fam Farnsworth MD  08/24/21 7626

## 2021-08-24 NOTE — ED TRIAGE NOTES
Pt alok from home after mom and caregiver think that patient is more lethargic and think it may be his sodium. They gave him 1.5 tsp salt with water and called 911. Pt is alert.

## 2021-08-24 NOTE — ED NOTES
Bed: ED11  Expected date: 8/24/21  Expected time: 6:09 PM  Means of arrival: Ambulance  Comments:  Flavio 61m lethargic ETA 1914

## 2021-09-10 NOTE — ANESTHESIA POSTPROCEDURE EVALUATION
Patient: Keyon Farias    Procedure(s):  Upper Endoscopy with Necrosectomy, stent placement, cystogastrostomy balloon dilation - Wound Class: II-Clean Contaminated  Endoscopic ultrasound with stent placement - Wound Class: II-Clean Contaminated    Diagnosis:Necrotizing Pancreatits  Diagnosis Additional Information: No value filed.    Anesthesia Type:  Trach, General    Note:  Anesthesia Post Evaluation    Patient location during evaluation: PACU  Patient participation: Able to fully participate in evaluation  Level of consciousness: awake and alert  Pain management: adequate  Airway patency: patent  Cardiovascular status: hemodynamically stable  Respiratory status: nonlabored ventilation and spontaneous ventilation  Hydration status: euvolemic  PONV: none     Anesthetic complications: None          Last vitals:  Filed Vitals:    02/07/17 1253 02/07/17 1500 02/07/17 1515   BP:  109/69    Pulse:      Temp:  36.3  C (97.4  F)    Resp: 20 20 18   SpO2: 100% 100%          Electronically Signed By: Too Poe MD  February 7, 2017  3:21 PM   none

## 2021-09-22 DIAGNOSIS — E03.8 SECONDARY HYPOTHYROIDISM: ICD-10-CM

## 2021-09-22 DIAGNOSIS — E27.49 SECONDARY ADRENAL INSUFFICIENCY (H): Primary | ICD-10-CM

## 2021-09-23 RX ORDER — LEVOTHYROXINE SODIUM 150 UG/1
150 TABLET ORAL DAILY
Qty: 90 TABLET | Refills: 3 | Status: SHIPPED | OUTPATIENT
Start: 2021-09-23 | End: 2022-09-20

## 2021-09-23 NOTE — TELEPHONE ENCOUNTER
levothyroxine (SYNTHROID/LEVOTHROID) 150 MCG tablet   Take 1 tablet (150 mcg) by mouth every morning At 0500       Last Written Prescription Date:  3/22/21-7/26/21    Last Fill Quantity: 90,   # refills: 1  Last Office Visit : 7/28/21  Central hypothyroidism   -continue Levothyroxine 150 mcg daily   Future Office visit:  none    Routing refill request to provider for review/approval because:  Drug not active on patient's medication list

## 2021-09-29 ENCOUNTER — HOSPITAL ENCOUNTER (OUTPATIENT)
Facility: CLINIC | Age: 61
Discharge: HOME | End: 2021-09-29
Attending: RADIOLOGY | Admitting: RADIOLOGY
Payer: MEDICARE

## 2021-09-29 ENCOUNTER — APPOINTMENT (OUTPATIENT)
Dept: INTERVENTIONAL RADIOLOGY/VASCULAR | Facility: CLINIC | Age: 61
End: 2021-09-29
Attending: NURSE PRACTITIONER
Payer: MEDICARE

## 2021-09-29 VITALS
SYSTOLIC BLOOD PRESSURE: 121 MMHG | HEART RATE: 83 BPM | DIASTOLIC BLOOD PRESSURE: 75 MMHG | RESPIRATION RATE: 18 BRPM | OXYGEN SATURATION: 97 % | TEMPERATURE: 97 F

## 2021-09-29 DIAGNOSIS — R13.10 DYSPHAGIA, UNSPECIFIED TYPE: ICD-10-CM

## 2021-09-29 PROCEDURE — 272N000584 ZZ HC TUBE GASTRO CR13

## 2021-09-29 PROCEDURE — C1769 GUIDE WIRE: HCPCS

## 2021-09-29 PROCEDURE — 999N000163 HC STATISTIC SIMPLE TUBE INSERTION/CHARGE, PORT, CATH, FISTULOGRAM

## 2021-09-29 PROCEDURE — 49452 REPLACE G-J TUBE PERC: CPT

## 2021-09-29 NOTE — PROGRESS NOTES
Care Suites Post Procedure Note (G Tube Exchange without sedation)    Patient Information  Name: Keyon Farisa  Age: 61 year old    Post Procedure  Time patient returned to Care Suites: 15:55  Concerns/abnormal assessment: None  If abnormal assessment, provider notified: N/A  Plan/Other: Discharge to home with mom and PCA    Care Suites Discharge Nursing Note    Discharge Education:  Discharge instructions reviewed: Yes  Patient/patient representative verbalizes understanding: Yes  Patient discharging on new medications: No  Medication education completed: Yes    Discharge Plans:   Discharge location: home  Discharge ride contacted: Yes  Approximate discharge time: 16:14    Discharge Criteria:  Discharge criteria met and vital signs stable: Yes    Patient Belongs:  Patient belongings returned to patient: Yes

## 2021-09-29 NOTE — IR NOTE
Interventional Radiology Intra-procedural Nursing Note    Patient Name: Keyon Farias  Medical Record Number: [id number]  Today's Date: September 29, 2021    Procedure: Gastrojejunostomy tube exchange  Start time: 1537  End time: 1542  Report provided to: Care Suites RN  Patient depart time and location: 1547 to Care Suites    Note: Patient entered Interventional Radiology Suite number one via cart. Contact isolation precautions maintained. Patient awake, alert and nonverbal. Assisted onto procedural table in supine position. Prepped and draped.  Dr. Villafana in room. Time out and procedure started.    Procedure well tolerated by patient without complications. Procedure end with debrief by Dr. Villafana.    Administered medication totals:  None

## 2021-09-29 NOTE — PROGRESS NOTES
Care Suites Admission Nursing Note    Patient Information  Name: Keyon Farias  Age: 61 year old  Reason for admission: G tube exchange  Care Suites arrival time: 1257    Visitor Information  Name: Savannah  Informed of visitor restrictions: Yes  1 visitor allowed per patient   Visitor must screen negative for COVID symptoms   Visitor must wear a mask  Waiting rooms closed to visitors    Patient Admission/Assessment   Pre-procedure assessment complete: Yes  If abnormal assessment/labs, provider notified: N/A  NPO: N/A  Medications held per instructions/orders: N/A  Consent: obtained  If applicable, pregnancy test status: deferred  Patient oriented to room: Yes  Education/questions answered: Yes  Plan/other: proceed as scheduled    Discharge Planning  Discharge name/phone number: Savannah  Overnight post sedation caregiver: Zackarymom  Discharge location: home    Sue Palumbo RN       PATIENT/VISITOR WELLNESS SCREENING    Step 1 Patient Screening    1. In the last month, have you been in contact with someone who was confirmed or suspected to have Coronavirus/COVID-19? No    2. Do you have the following symptoms?  Fever/Chills? No   Cough? No   Shortness of breath? No   New loss of taste or smell? No  Sore throat? No  Muscle or body aches? No  Headaches? No  Fatigue? No  Vomiting or diarrhea? No    Step 2 Visitor Screening    1. Name of Visitor (1 visitor per patient): Savannah    2. In the last month, have you been in contact with someone who was confirmed or suspected to have Coronavirus/COVID-19? No    3. Do you have the following symptoms?  Fever/Chills? No   Cough? No   Shortness of breath? No   Skin rash? No   Loss of taste or smell? No  Sore throat? No  Runny or stuffy nose? No  Muscle or body aches? No  Headaches? No  Fatigue? No  Vomiting or diarrhea? No    If the visitor has positive symptoms, notify supervisor/manger  Per policy, the visitor will need to leave the facility     Step 3 Refer to logic grid  below for actions    NO SYMPTOM(S)    ACTIONS:  1. Standard rooming process  2. Provider to assess per normal protocol  3. Implement precautions as needed and per guidelines     POSITIVE SYMPTOM(S)  If positive for ANY of the following symptoms: fever, cough, shortness of breath, rash    ACTION:  1. Continue to have the patient wear a mask   2. Room patient as soon as possible  3. Don appropriate PPE when entering room  4. Provider evaluation

## 2021-09-29 NOTE — PRE-PROCEDURE
GENERAL PRE-PROCEDURE:   Procedure:  Gastro jejunostomy tube exchange  Date/Time:  9/29/2021 1:34 PM    Written consent obtained?: Yes    Risks and benefits: Risks, benefits and alternatives were discussed    Consent given by:  Guardian  Patient states understanding of procedure being performed: Yes    Patient's understanding of procedure matches consent: Yes    Procedure consent matches procedure scheduled: Yes    Appropriately NPO:  Yes

## 2021-09-29 NOTE — DISCHARGE INSTRUCTIONS
G-tube Exchange Discharge Instructions     After you go home:      You may resume your normal diet    Care of Insertion Site:      For the first 48 hrs, check your puncture site every couple hours while you are awake     You may remove/change the dressing tomorrow    You may shower tomorrow    No tub baths, whirlpools or swimming until your puncture site has fully healed     Activity       You may go back to normal activity in 24 hours    Wait 48 hours before lifting, straining, exercise or other strenuous activity    Medicines:      You may resume all medications    Resume your Warfarin/Coumadin at your regular dose today. Follow up with your provider to have your INR rechecked    Resume your Platelet Inhibitors and Aspirin tomorrow at your regular dose    For minor pain, you may take Acetaminophen (Tylenol) or Ibuprofen (Advil)                 Call the provider who ordered this procedure if:      Blood or fluid is draining from the site    The site is red, swollen, hot, tender or there is foul-smelling drainage    Chills or a fever greater than 101 F (38 C)    Increased pain at the site    Any questions or concerns    Call  911 or go to the Emergency Room if:      Severe pain or trouble breathing    Bleeding that you cannot control      If you have questions call:          [facility] Radiology Dept @ [phone]        The provider who performed your procedure was _________________.        Caring for your G-tube    Tube Maintenance:    For ENFit Tubes:      Do NOT over tighten the connections (the purple end & hub connection).      Clean the connecting ends (especially the hub) every day.      If you are unable to disconnect the tubing ends, soak the connection in warm water (or wrap in a wet warm washcloth) to try and loosen the connection.    Possible problems with your tube may include:      Clogged with medications or feedings - most obstructions can be cleared with a small (3cc) syringe and warm  water. This may be repeated until the tube is unclogged. This can be prevented by frequently flushing the tube with water (60cc) during the day and always after medications & feedings.      Tube pulls out or falls out -cover the opening with gauze & tape. Call [phone] for further instructions      Skin breakdown and/or yeast infections at the insertion site - use of skin barrier ointments and anti-fungal powders can treat most site irritations.  Ask your pharmacist or provider for assistance (a prescription is not necessary).    In general, tube problems (including pulled tubes) are NOT emergency situations. Unless the pulling out of a tube is accompanied by uncontrollable bleeding, please DO NOT GO TO THE EMERGENCY ROOM!  Call [phone] with problems.    Tube Care:      Change the gauze dressing every 24 hours and if soiled (dirty).  Stabilize all tubes securely by using gauze and tape.  Clean tube site with soap & water using a cotton applicator (Q-tip) as needed to prevent irritation.    Flush feeding tube with 60cc of warm or room temperature water before and after meds.  To prevent the tube from clogging, ask your provider or pharmacist if liquid forms of your medications are available. If not, crush the pills well & be sure to flush the tube before & after all medications.    Flush feeding tube a minimum of every 4 hours and when feeding is completed with 60cc of water to keep the tube clear and avoid clogging.    Pt may use an abdominal (waist) binder to protect the G-tube.    If there is continued oozing or bleeding, redness, yellow/green/foul smelling drainage    STOP the feedings & use of the tube immediately if there is:      Continued oozing or bleeding at the site    Redness    Yellow/green or foul smelling drainage at the site    Uncontrolled stomach pain    Many of the supplies mentioned above can be purchased at your local pharmacy      For issues with your tube, please call:    [facility]  Interventional Radiology Dept at [phone]

## 2021-09-30 ENCOUNTER — HOSPITAL ENCOUNTER (EMERGENCY)
Facility: CLINIC | Age: 61
Discharge: HOME | End: 2021-09-30
Attending: EMERGENCY MEDICINE | Admitting: EMERGENCY MEDICINE
Payer: MEDICARE

## 2021-09-30 ENCOUNTER — APPOINTMENT (OUTPATIENT)
Dept: GENERAL RADIOLOGY | Facility: CLINIC | Age: 61
End: 2021-09-30
Attending: EMERGENCY MEDICINE
Payer: MEDICARE

## 2021-09-30 VITALS
RESPIRATION RATE: 24 BRPM | HEART RATE: 59 BPM | DIASTOLIC BLOOD PRESSURE: 67 MMHG | SYSTOLIC BLOOD PRESSURE: 127 MMHG | OXYGEN SATURATION: 95 % | TEMPERATURE: 98.6 F

## 2021-09-30 DIAGNOSIS — R53.1 GENERALIZED WEAKNESS: ICD-10-CM

## 2021-09-30 LAB
ALBUMIN SERPL-MCNC: 3.3 G/DL (ref 3.4–5)
ALBUMIN UR-MCNC: 20 MG/DL
ALP SERPL-CCNC: 133 U/L (ref 40–150)
ALT SERPL W P-5'-P-CCNC: 29 U/L (ref 0–70)
ANION GAP SERPL CALCULATED.3IONS-SCNC: 6 MMOL/L (ref 3–14)
APPEARANCE UR: CLEAR
AST SERPL W P-5'-P-CCNC: 25 U/L (ref 0–45)
BASOPHILS # BLD AUTO: 0.1 10E3/UL (ref 0–0.2)
BASOPHILS NFR BLD AUTO: 1 %
BILIRUB SERPL-MCNC: 0.6 MG/DL (ref 0.2–1.3)
BILIRUB UR QL STRIP: NEGATIVE
BUN SERPL-MCNC: 19 MG/DL (ref 7–30)
CALCIUM SERPL-MCNC: 8.8 MG/DL (ref 8.5–10.1)
CHLORIDE BLD-SCNC: 105 MMOL/L (ref 94–109)
CO2 SERPL-SCNC: 26 MMOL/L (ref 20–32)
COLOR UR AUTO: YELLOW
CREAT SERPL-MCNC: 1.08 MG/DL (ref 0.66–1.25)
EOSINOPHIL # BLD AUTO: 0.5 10E3/UL (ref 0–0.7)
EOSINOPHIL NFR BLD AUTO: 5 %
ERYTHROCYTE [DISTWIDTH] IN BLOOD BY AUTOMATED COUNT: 15.4 % (ref 10–15)
GFR SERPL CREATININE-BSD FRML MDRD: 75 ML/MIN/1.73M2
GLUCOSE BLD-MCNC: 115 MG/DL (ref 70–99)
GLUCOSE UR STRIP-MCNC: NEGATIVE MG/DL
HCT VFR BLD AUTO: 47.9 % (ref 40–53)
HGB BLD-MCNC: 15.6 G/DL (ref 13.3–17.7)
HGB UR QL STRIP: NEGATIVE
HOLD SPECIMEN: NORMAL
IMM GRANULOCYTES # BLD: 0 10E3/UL
IMM GRANULOCYTES NFR BLD: 0 %
KETONES UR STRIP-MCNC: NEGATIVE MG/DL
LACTATE SERPL-SCNC: 1.6 MMOL/L (ref 0.7–2)
LEUKOCYTE ESTERASE UR QL STRIP: NEGATIVE
LYMPHOCYTES # BLD AUTO: 3.4 10E3/UL (ref 0.8–5.3)
LYMPHOCYTES NFR BLD AUTO: 33 %
MCH RBC QN AUTO: 27.9 PG (ref 26.5–33)
MCHC RBC AUTO-ENTMCNC: 32.6 G/DL (ref 31.5–36.5)
MCV RBC AUTO: 86 FL (ref 78–100)
MONOCYTES # BLD AUTO: 1.4 10E3/UL (ref 0–1.3)
MONOCYTES NFR BLD AUTO: 13 %
MUCOUS THREADS #/AREA URNS LPF: PRESENT /LPF
NEUTROPHILS # BLD AUTO: 5 10E3/UL (ref 1.6–8.3)
NEUTROPHILS NFR BLD AUTO: 48 %
NITRATE UR QL: NEGATIVE
NRBC # BLD AUTO: 0 10E3/UL
NRBC BLD AUTO-RTO: 0 /100
PH UR STRIP: 6 [PH] (ref 5–7)
PLATELET # BLD AUTO: 205 10E3/UL (ref 150–450)
POTASSIUM BLD-SCNC: 4.5 MMOL/L (ref 3.4–5.3)
PROT SERPL-MCNC: 8.1 G/DL (ref 6.8–8.8)
RBC # BLD AUTO: 5.59 10E6/UL (ref 4.4–5.9)
RBC URINE: 2 /HPF
SARS-COV-2 RNA RESP QL NAA+PROBE: NEGATIVE
SODIUM SERPL-SCNC: 137 MMOL/L (ref 133–144)
SP GR UR STRIP: 1.03 (ref 1–1.03)
SQUAMOUS EPITHELIAL: <1 /HPF
UROBILINOGEN UR STRIP-MCNC: 4 MG/DL
WBC # BLD AUTO: 10.5 10E3/UL (ref 4–11)
WBC URINE: 1 /HPF

## 2021-09-30 PROCEDURE — 87086 URINE CULTURE/COLONY COUNT: CPT | Performed by: EMERGENCY MEDICINE

## 2021-09-30 PROCEDURE — C9803 HOPD COVID-19 SPEC COLLECT: HCPCS

## 2021-09-30 PROCEDURE — 80053 COMPREHEN METABOLIC PANEL: CPT | Performed by: EMERGENCY MEDICINE

## 2021-09-30 PROCEDURE — 87635 SARS-COV-2 COVID-19 AMP PRB: CPT | Performed by: EMERGENCY MEDICINE

## 2021-09-30 PROCEDURE — 36415 COLL VENOUS BLD VENIPUNCTURE: CPT | Performed by: EMERGENCY MEDICINE

## 2021-09-30 PROCEDURE — 96360 HYDRATION IV INFUSION INIT: CPT

## 2021-09-30 PROCEDURE — 99285 EMERGENCY DEPT VISIT HI MDM: CPT | Mod: 25

## 2021-09-30 PROCEDURE — 83605 ASSAY OF LACTIC ACID: CPT | Performed by: EMERGENCY MEDICINE

## 2021-09-30 PROCEDURE — 71045 X-RAY EXAM CHEST 1 VIEW: CPT

## 2021-09-30 PROCEDURE — 93005 ELECTROCARDIOGRAM TRACING: CPT

## 2021-09-30 PROCEDURE — 258N000003 HC RX IP 258 OP 636: Performed by: EMERGENCY MEDICINE

## 2021-09-30 PROCEDURE — 87040 BLOOD CULTURE FOR BACTERIA: CPT | Performed by: EMERGENCY MEDICINE

## 2021-09-30 PROCEDURE — 85025 COMPLETE CBC W/AUTO DIFF WBC: CPT | Performed by: EMERGENCY MEDICINE

## 2021-09-30 PROCEDURE — 81001 URINALYSIS AUTO W/SCOPE: CPT | Performed by: EMERGENCY MEDICINE

## 2021-09-30 RX ADMIN — SODIUM CHLORIDE 1000 ML: 9 INJECTION, SOLUTION INTRAVENOUS at 16:08

## 2021-09-30 ASSESSMENT — ENCOUNTER SYMPTOMS
FATIGUE: 1
WEAKNESS: 1

## 2021-09-30 NOTE — ED PROVIDER NOTES
History   Chief Complaint:  Shortness of Breath       HPI   Keyon Farias is a 61 year old male who presents with fatigue. Per patient's guardian, the patient has been hard to wake up and lethargic for the past 3-4 days and generally does not respond as usual. Last time he had such symptoms when he turned out to have low sodium levels. He also seems very weak, has trouble falling asleep at night and sleeps during the days. He also has very runny bowel movements and his urine is dark yellow in color although she denies seeing any blood in urine or stool. Today patient's oxygen was in high 80s which is not typical for him. He is not on any anticoagulants, no history of blood clots. He is fully vaccinated against covid. Patient usually utters some words but lately is nonverbal.  Of note yesterday the patient had his G tube replaced.    Review of Systems   Unable to perform ROS: Patient nonverbal   Constitutional: Positive for fatigue.   Neurological: Positive for weakness.     Allergies:  Valproic acid  Dilantin  Scopolamine     Medications:  acetylcysteine  albuterol   aspirin 81  bacitracin ointment   Brivaracetam  calcium carbonate   carBAMazepine   clotrimazole-betamethasone   hydrocortisone   hydrocortisone   hydrocortisone sodium succinate PF   levothyroxine   metoclopramide   miconazole   multivitamin, therapeutic   mupirocin   pantoprazole   potassium & sodium phosphates   prochlorperazine   Scopolamine HBr POWD  Skin Protectants, Misc.   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  The patient presents to the emergency department with his caregiver.    Physical Exam     Patient Vitals for the past 24 hrs:   BP Temp Temp src Pulse Resp SpO2   09/30/21 1900 108/85 -- -- 59 -- 99 %   09/30/21 1800 107/63 -- -- 81 -- 95 %   09/30/21 1730 101/84 -- -- 87 -- 94 %   09/30/21 1630 109/84 -- -- 85 -- 96 %   09/30/21 1600 105/82 -- -- 85 -- 95 %   09/30/21 1500 112/74 -- -- 102 -- 95 %   09/30/21 1400 97/73 -- -- -- -- 92 %   09/30/21 1332 108/74 98.6  F (37  C) Oral 105 24 93 %     SKIN:  Warm, dry.  No jaundice.  No rash.  Atraumatic.  Gastrostomy tube site is clean and noninflamed without drainage.  HEMATOLOGIC/IMMUNOLOGIC/LYMPHATIC:  No pallor.  No extremity edema.  HENT: No jugular venous distention.  Moist oral mucosa.  EYES:  Conjunctivae normal.  CARDIOVASCULAR: Tachycardic rate with regular rhythm.  No murmur.  No rub.  RESPIRATORY:  No respiratory distress, breath sounds equal and normal.  GASTROINTESTINAL:  Soft, nontender abdomen with active bowel sounds.  No distention.  No palpable mass.  MUSCULOSKELETAL: Normal body habitus.  Extremity contractures.  NEUROLOGIC:  Alert, largely nonverbal.  PSYCHIATRIC: Unable.      Emergency Department Course     ECG:  ECG taken at 1624, ECG read at 1653  Normal sinus rhythm  Left anterior fascicular block  Abnormal ECG  Rate 81 bpm. VT interval 160 ms. QRS duration 96 ms. QT/QTc 404/469 ms. P-R-T axes 62 -46 41.    Imaging:  XR Chest Port 1 View  Portable chest. Subtle underlying fibrotic changes  suspected at the lung bases, unchanged. No new infiltrate or lung  consolidation. Heart is normal in size. No pneumothorax. No definite  pleural effusions.    GEE BOWMAN MD      Laboratory:  CBC: WBC 10.5, HGB 15.6,      CMP: Glucose 115 (H), Albumin: 3.3 (L), o/w WNL (Creatinine:  1.08)    UA: Protein Albumin: 20 (A), Urobilinogen: 4.0 (A), Mucous: Present, o/w Negative    Blood Cultures x2: Pending    Urine Culture Aerobic Bacterial: Pending    Symptomatic COVID-19 PCR: Negative    Emergency Department Course:    Reviewed:  I reviewed nursing notes, vitals, past medical history and care everywhere    Assessments/Consults  1521 I obtained history and examined the patient as noted above.     Disposition:  The patient was discharged to home.       Impression & Plan     Medical Decision Making:  This patient presents with generalized weakness. History gathered from the patient's mother. Given the communication barrier a number of tests were performed as above which were normal. On recheck the patient's mother was wondering if the patient's symptoms could be due to depression which is certainly possible. I think the patient can be discharged home in the company of his mother and she is comfortable with primary care follow up. I advised to return if he worsens in any fashion or if new concerning symptoms arise.    Covid-19  Keyon Farias was evaluated during a global COVID-19 pandemic, which necessitated consideration that the patient might be at risk for infection with the SARS-CoV-2 virus that causes COVID-19.   Applicable protocols for evaluation were followed during the patient's care.   COVID-19 was considered as part of the patient's evaluation. The plan for testing is:  a test was obtained during this visit.    Diagnosis:    ICD-10-CM    1. Generalized weakness  R53.1        Scribe Disclosure:  Luciano DYKES, am serving as a scribe at 3:35 PM on 9/30/2021 to document services personally performed by Too Rodarte MD based on my observations and the provider's statements to me.            Too Rodarte MD  09/30/21 9135

## 2021-09-30 NOTE — ED TRIAGE NOTES
Pt BIBA from home. Pt's HHA and mother reported sats dropped for pt. EMS reported 93% on RA. Pt non verbal. Pt had GT replaced yesterday. Pt has cough and occasional congestion.

## 2021-10-01 LAB
ATRIAL RATE - MUSE: 81 BPM
DIASTOLIC BLOOD PRESSURE - MUSE: NORMAL MMHG
INTERPRETATION ECG - MUSE: NORMAL
P AXIS - MUSE: 62 DEGREES
PR INTERVAL - MUSE: 160 MS
QRS DURATION - MUSE: 96 MS
QT - MUSE: 404 MS
QTC - MUSE: 469 MS
R AXIS - MUSE: -46 DEGREES
SYSTOLIC BLOOD PRESSURE - MUSE: NORMAL MMHG
T AXIS - MUSE: 41 DEGREES
VENTRICULAR RATE- MUSE: 81 BPM

## 2021-10-01 NOTE — RESULT ENCOUNTER NOTE
[facility] Emergency Dept discharge antibiotic (if prescribed): None  No changes in treatment per [facility] ED Lab Result Urine culture protocol.

## 2021-10-01 NOTE — TELEPHONE ENCOUNTER
Not sure why this was sent to schedulers. Forwarding to nurses.    Faizan Mclean MD routed conversation to [id number] 8 days ago     Becky Sesay, Faizan Chadwick MD 8 days ago       Routing refill request to provider for review/approval because:   Drug not active on patient's medication list

## 2021-10-02 LAB — BACTERIA UR CULT: NO GROWTH

## 2021-10-03 NOTE — RESULT ENCOUNTER NOTE
"Final urine culture report shows \"NO GROWTH\" and is NEGATIVE.  Recommendations in treatment per [facility] ED Lab result Urine culture protocol.    "

## 2021-10-05 LAB
BACTERIA BLD CULT: NO GROWTH
BACTERIA BLD CULT: NO GROWTH

## 2021-10-28 ENCOUNTER — APPOINTMENT (OUTPATIENT)
Dept: GENERAL RADIOLOGY | Facility: CLINIC | Age: 61
DRG: 178 | End: 2021-10-28
Attending: EMERGENCY MEDICINE
Payer: MEDICARE

## 2021-10-28 ENCOUNTER — HOSPITAL ENCOUNTER (INPATIENT)
Facility: CLINIC | Age: 61
LOS: 2 days | Discharge: HOME | DRG: 178 | End: 2021-10-30
Attending: EMERGENCY MEDICINE | Admitting: HOSPITALIST
Payer: MEDICARE

## 2021-10-28 DIAGNOSIS — J18.9 PNEUMONIA DUE TO INFECTIOUS ORGANISM, UNSPECIFIED LATERALITY, UNSPECIFIED PART OF LUNG: ICD-10-CM

## 2021-10-28 DIAGNOSIS — J69.0 ASPIRATION PNEUMONIA OF LEFT LOWER LOBE, UNSPECIFIED ASPIRATION PNEUMONIA TYPE (H): Primary | ICD-10-CM

## 2021-10-28 LAB
ALBUMIN UR-MCNC: 50 MG/DL
ANION GAP SERPL CALCULATED.3IONS-SCNC: 5 MMOL/L (ref 3–14)
APPEARANCE UR: CLEAR
BASOPHILS # BLD AUTO: 0.1 10E3/UL (ref 0–0.2)
BASOPHILS NFR BLD AUTO: 1 %
BILIRUB UR QL STRIP: NEGATIVE
BUN SERPL-MCNC: 17 MG/DL (ref 7–30)
CALCIUM SERPL-MCNC: 8.6 MG/DL (ref 8.5–10.1)
CHLORIDE BLD-SCNC: 112 MMOL/L (ref 94–109)
CO2 SERPL-SCNC: 27 MMOL/L (ref 20–32)
COLOR UR AUTO: YELLOW
CREAT SERPL-MCNC: 0.81 MG/DL (ref 0.66–1.25)
EOSINOPHIL # BLD AUTO: 0.8 10E3/UL (ref 0–0.7)
EOSINOPHIL NFR BLD AUTO: 9 %
ERYTHROCYTE [DISTWIDTH] IN BLOOD BY AUTOMATED COUNT: 14.7 % (ref 10–15)
FLUAV RNA SPEC QL NAA+PROBE: NEGATIVE
FLUBV RNA RESP QL NAA+PROBE: NEGATIVE
GFR SERPL CREATININE-BSD FRML MDRD: >90 ML/MIN/1.73M2
GLUCOSE BLD-MCNC: 119 MG/DL (ref 70–99)
GLUCOSE UR STRIP-MCNC: NEGATIVE MG/DL
HCT VFR BLD AUTO: 42.3 % (ref 40–53)
HGB BLD-MCNC: 13.9 G/DL (ref 13.3–17.7)
HGB UR QL STRIP: NEGATIVE
IMM GRANULOCYTES # BLD: 0 10E3/UL
IMM GRANULOCYTES NFR BLD: 0 %
KETONES UR STRIP-MCNC: NEGATIVE MG/DL
LACTATE SERPL-SCNC: 1.5 MMOL/L (ref 0.7–2)
LEUKOCYTE ESTERASE UR QL STRIP: NEGATIVE
LYMPHOCYTES # BLD AUTO: 2.6 10E3/UL (ref 0.8–5.3)
LYMPHOCYTES NFR BLD AUTO: 29 %
MCH RBC QN AUTO: 29.1 PG (ref 26.5–33)
MCHC RBC AUTO-ENTMCNC: 32.9 G/DL (ref 31.5–36.5)
MCV RBC AUTO: 89 FL (ref 78–100)
MONOCYTES # BLD AUTO: 0.8 10E3/UL (ref 0–1.3)
MONOCYTES NFR BLD AUTO: 9 %
NEUTROPHILS # BLD AUTO: 4.7 10E3/UL (ref 1.6–8.3)
NEUTROPHILS NFR BLD AUTO: 52 %
NITRATE UR QL: NEGATIVE
NRBC # BLD AUTO: 0 10E3/UL
NRBC BLD AUTO-RTO: 0 /100
PH UR STRIP: 8.5 [PH] (ref 5–7)
PLATELET # BLD AUTO: 148 10E3/UL (ref 150–450)
POTASSIUM BLD-SCNC: 4.4 MMOL/L (ref 3.4–5.3)
PROCALCITONIN SERPL-MCNC: <0.05 NG/ML
RBC # BLD AUTO: 4.77 10E6/UL (ref 4.4–5.9)
RBC URINE: <1 /HPF
SARS-COV-2 RNA RESP QL NAA+PROBE: NEGATIVE
SODIUM SERPL-SCNC: 144 MMOL/L (ref 133–144)
SP GR UR STRIP: 1.03 (ref 1–1.03)
SQUAMOUS EPITHELIAL: <1 /HPF
UROBILINOGEN UR STRIP-MCNC: 2 MG/DL
WBC # BLD AUTO: 9 10E3/UL (ref 4–11)
WBC URINE: <1 /HPF

## 2021-10-28 PROCEDURE — 36415 COLL VENOUS BLD VENIPUNCTURE: CPT | Performed by: EMERGENCY MEDICINE

## 2021-10-28 PROCEDURE — 250N000013 HC RX MED GY IP 250 OP 250 PS 637: Performed by: HOSPITALIST

## 2021-10-28 PROCEDURE — 99223 1ST HOSP IP/OBS HIGH 75: CPT | Mod: AI | Performed by: HOSPITALIST

## 2021-10-28 PROCEDURE — 83605 ASSAY OF LACTIC ACID: CPT | Performed by: EMERGENCY MEDICINE

## 2021-10-28 PROCEDURE — 87040 BLOOD CULTURE FOR BACTERIA: CPT | Performed by: EMERGENCY MEDICINE

## 2021-10-28 PROCEDURE — 250N000011 HC RX IP 250 OP 636: Performed by: EMERGENCY MEDICINE

## 2021-10-28 PROCEDURE — C9803 HOPD COVID-19 SPEC COLLECT: HCPCS

## 2021-10-28 PROCEDURE — 120N000001 HC R&B MED SURG/OB

## 2021-10-28 PROCEDURE — 258N000003 HC RX IP 258 OP 636: Performed by: EMERGENCY MEDICINE

## 2021-10-28 PROCEDURE — 80048 BASIC METABOLIC PNL TOTAL CA: CPT | Performed by: EMERGENCY MEDICINE

## 2021-10-28 PROCEDURE — 93005 ELECTROCARDIOGRAM TRACING: CPT

## 2021-10-28 PROCEDURE — 250N000011 HC RX IP 250 OP 636: Performed by: HOSPITALIST

## 2021-10-28 PROCEDURE — 84145 PROCALCITONIN (PCT): CPT | Performed by: HOSPITALIST

## 2021-10-28 PROCEDURE — 81001 URINALYSIS AUTO W/SCOPE: CPT | Performed by: EMERGENCY MEDICINE

## 2021-10-28 PROCEDURE — 94640 AIRWAY INHALATION TREATMENT: CPT

## 2021-10-28 PROCEDURE — 96361 HYDRATE IV INFUSION ADD-ON: CPT

## 2021-10-28 PROCEDURE — 250N000009 HC RX 250: Performed by: HOSPITALIST

## 2021-10-28 PROCEDURE — 999N000157 HC STATISTIC RCP TIME EA 10 MIN

## 2021-10-28 PROCEDURE — 85004 AUTOMATED DIFF WBC COUNT: CPT | Performed by: EMERGENCY MEDICINE

## 2021-10-28 PROCEDURE — 96365 THER/PROPH/DIAG IV INF INIT: CPT

## 2021-10-28 PROCEDURE — 87636 SARSCOV2 & INF A&B AMP PRB: CPT | Performed by: EMERGENCY MEDICINE

## 2021-10-28 PROCEDURE — 99285 EMERGENCY DEPT VISIT HI MDM: CPT

## 2021-10-28 PROCEDURE — 71045 X-RAY EXAM CHEST 1 VIEW: CPT

## 2021-10-28 RX ORDER — MULTIVITAMIN,THERAPEUTIC
1 TABLET ORAL DAILY
Status: DISCONTINUED | OUTPATIENT
Start: 2021-10-29 | End: 2021-10-28

## 2021-10-28 RX ORDER — ACETYLCYSTEINE 200 MG/ML
2 SOLUTION ORAL; RESPIRATORY (INHALATION) 4 TIMES DAILY
Status: DISCONTINUED | OUTPATIENT
Start: 2021-10-28 | End: 2021-10-30 | Stop reason: HOSPADM

## 2021-10-28 RX ORDER — CARBAMAZEPINE 100 MG/5ML
150 SUSPENSION ORAL 3 TIMES DAILY
Status: DISCONTINUED | OUTPATIENT
Start: 2021-10-29 | End: 2021-10-30 | Stop reason: HOSPADM

## 2021-10-28 RX ORDER — PROCHLORPERAZINE 25 MG
25 SUPPOSITORY, RECTAL RECTAL EVERY 12 HOURS PRN
Status: DISCONTINUED | OUTPATIENT
Start: 2021-10-28 | End: 2021-10-30 | Stop reason: HOSPADM

## 2021-10-28 RX ORDER — CARBAMAZEPINE 100 MG/5ML
100 SUSPENSION ORAL DAILY
Status: DISCONTINUED | OUTPATIENT
Start: 2021-10-28 | End: 2021-10-28

## 2021-10-28 RX ORDER — ACETAMINOPHEN 325 MG/1
650 TABLET ORAL EVERY 6 HOURS PRN
Status: DISCONTINUED | OUTPATIENT
Start: 2021-10-28 | End: 2021-10-30 | Stop reason: HOSPADM

## 2021-10-28 RX ORDER — DEXTROSE MONOHYDRATE 100 MG/ML
INJECTION, SOLUTION INTRAVENOUS CONTINUOUS PRN
Status: DISCONTINUED | OUTPATIENT
Start: 2021-10-28 | End: 2021-10-30 | Stop reason: HOSPADM

## 2021-10-28 RX ORDER — PIPERACILLIN SODIUM, TAZOBACTAM SODIUM 3; .375 G/15ML; G/15ML
3.38 INJECTION, POWDER, LYOPHILIZED, FOR SOLUTION INTRAVENOUS EVERY 6 HOURS
Status: DISCONTINUED | OUTPATIENT
Start: 2021-10-28 | End: 2021-10-30

## 2021-10-28 RX ORDER — CALCIUM CARBONATE 1250 MG/5ML
1250 SUSPENSION ORAL 3 TIMES DAILY
Status: DISCONTINUED | OUTPATIENT
Start: 2021-10-28 | End: 2021-10-28

## 2021-10-28 RX ORDER — BACITRACIN ZINC 500 [USP'U]/G
OINTMENT TOPICAL DAILY PRN
Status: DISCONTINUED | OUTPATIENT
Start: 2021-10-28 | End: 2021-10-30 | Stop reason: HOSPADM

## 2021-10-28 RX ORDER — CARBAMAZEPINE 100 MG/5ML
100 SUSPENSION ORAL DAILY
Status: DISCONTINUED | OUTPATIENT
Start: 2021-10-29 | End: 2021-10-30 | Stop reason: HOSPADM

## 2021-10-28 RX ORDER — LEVOTHYROXINE SODIUM 75 UG/1
150 TABLET ORAL
Status: DISCONTINUED | OUTPATIENT
Start: 2021-10-29 | End: 2021-10-28

## 2021-10-28 RX ORDER — CALCIUM CARBONATE 1250 MG/5ML
1250 SUSPENSION ORAL 3 TIMES DAILY
Status: DISCONTINUED | OUTPATIENT
Start: 2021-10-29 | End: 2021-10-30 | Stop reason: HOSPADM

## 2021-10-28 RX ORDER — AZITHROMYCIN 500 MG/1
500 INJECTION, POWDER, LYOPHILIZED, FOR SOLUTION INTRAVENOUS ONCE
Status: COMPLETED | OUTPATIENT
Start: 2021-10-28 | End: 2021-10-28

## 2021-10-28 RX ORDER — LIDOCAINE 40 MG/G
CREAM TOPICAL
Status: DISCONTINUED | OUTPATIENT
Start: 2021-10-28 | End: 2021-10-30 | Stop reason: HOSPADM

## 2021-10-28 RX ORDER — METOCLOPRAMIDE HYDROCHLORIDE 5 MG/5ML
10 SOLUTION ORAL
Status: DISCONTINUED | OUTPATIENT
Start: 2021-10-28 | End: 2021-10-28

## 2021-10-28 RX ORDER — ASPIRIN 81 MG/1
81 TABLET, CHEWABLE ORAL DAILY
Status: DISCONTINUED | OUTPATIENT
Start: 2021-10-29 | End: 2021-10-30 | Stop reason: HOSPADM

## 2021-10-28 RX ORDER — CHOLECALCIFEROL (VITAMIN D3) 50 MCG
50 TABLET ORAL DAILY
Status: DISCONTINUED | OUTPATIENT
Start: 2021-10-29 | End: 2021-10-29

## 2021-10-28 RX ORDER — GUAR GUM
1 PACKET (EA) ORAL DAILY
Status: DISCONTINUED | OUTPATIENT
Start: 2021-10-29 | End: 2021-10-30 | Stop reason: HOSPADM

## 2021-10-28 RX ORDER — MULTIVITAMIN,THERAPEUTIC
1 TABLET ORAL DAILY
Status: DISCONTINUED | OUTPATIENT
Start: 2021-10-29 | End: 2021-10-29

## 2021-10-28 RX ORDER — POLYETHYLENE GLYCOL 3350 17 G/17G
17 POWDER, FOR SOLUTION ORAL DAILY PRN
Status: DISCONTINUED | OUTPATIENT
Start: 2021-10-28 | End: 2021-10-29

## 2021-10-28 RX ORDER — PETROLATUM 0.51 G/G
OINTMENT TOPICAL 2 TIMES DAILY PRN
Status: DISCONTINUED | OUTPATIENT
Start: 2021-10-28 | End: 2021-10-28

## 2021-10-28 RX ORDER — BISACODYL 10 MG
10 SUPPOSITORY, RECTAL RECTAL DAILY PRN
Status: DISCONTINUED | OUTPATIENT
Start: 2021-10-28 | End: 2021-10-30 | Stop reason: HOSPADM

## 2021-10-28 RX ORDER — METOCLOPRAMIDE HYDROCHLORIDE 5 MG/5ML
10 SOLUTION ORAL
Status: DISCONTINUED | OUTPATIENT
Start: 2021-10-29 | End: 2021-10-30 | Stop reason: HOSPADM

## 2021-10-28 RX ORDER — ONDANSETRON 2 MG/ML
4 INJECTION INTRAMUSCULAR; INTRAVENOUS EVERY 6 HOURS PRN
Status: DISCONTINUED | OUTPATIENT
Start: 2021-10-28 | End: 2021-10-30 | Stop reason: HOSPADM

## 2021-10-28 RX ORDER — ACETAMINOPHEN 650 MG/1
650 SUPPOSITORY RECTAL EVERY 6 HOURS PRN
Status: DISCONTINUED | OUTPATIENT
Start: 2021-10-28 | End: 2021-10-30 | Stop reason: HOSPADM

## 2021-10-28 RX ORDER — ONDANSETRON 4 MG/1
4 TABLET, ORALLY DISINTEGRATING ORAL EVERY 6 HOURS PRN
Status: DISCONTINUED | OUTPATIENT
Start: 2021-10-28 | End: 2021-10-30 | Stop reason: HOSPADM

## 2021-10-28 RX ORDER — CEFTRIAXONE 2 G/1
2 INJECTION, POWDER, FOR SOLUTION INTRAMUSCULAR; INTRAVENOUS ONCE
Status: COMPLETED | OUTPATIENT
Start: 2021-10-28 | End: 2021-10-28

## 2021-10-28 RX ORDER — LEVOTHYROXINE SODIUM 75 UG/1
150 TABLET ORAL
Status: DISCONTINUED | OUTPATIENT
Start: 2021-10-29 | End: 2021-10-30 | Stop reason: HOSPADM

## 2021-10-28 RX ORDER — SODIUM BICARBONATE 650 MG/1
650 TABLET ORAL DAILY
Status: DISCONTINUED | OUTPATIENT
Start: 2021-10-29 | End: 2021-10-30 | Stop reason: HOSPADM

## 2021-10-28 RX ORDER — ALBUTEROL SULFATE 0.83 MG/ML
2.5 SOLUTION RESPIRATORY (INHALATION)
Status: DISCONTINUED | OUTPATIENT
Start: 2021-10-28 | End: 2021-10-30 | Stop reason: HOSPADM

## 2021-10-28 RX ORDER — ASCORBIC ACID 500 MG
1000 TABLET ORAL DAILY
Status: DISCONTINUED | OUTPATIENT
Start: 2021-10-29 | End: 2021-10-30 | Stop reason: HOSPADM

## 2021-10-28 RX ADMIN — CARBAMAZEPINE 100 MG: 100 SUSPENSION ORAL at 21:28

## 2021-10-28 RX ADMIN — BRIVARACETAM 100 MG: 10 SOLUTION ORAL at 21:27

## 2021-10-28 RX ADMIN — PIPERACILLIN SODIUM AND TAZOBACTAM SODIUM 3.38 G: 3; .375 INJECTION, POWDER, LYOPHILIZED, FOR SOLUTION INTRAVENOUS at 21:22

## 2021-10-28 RX ADMIN — ACETYLCYSTEINE 2 ML: 200 SOLUTION ORAL; RESPIRATORY (INHALATION) at 20:16

## 2021-10-28 RX ADMIN — SODIUM CHLORIDE 1000 ML: 9 INJECTION, SOLUTION INTRAVENOUS at 13:15

## 2021-10-28 RX ADMIN — METOCLOPRAMIDE HYDROCHLORIDE 10 MG: 5 SOLUTION ORAL at 21:27

## 2021-10-28 RX ADMIN — CALCIUM CARBONATE 1250 MG: 1250 SUSPENSION ORAL at 21:28

## 2021-10-28 RX ADMIN — ALBUTEROL SULFATE 2.5 MG: 2.5 SOLUTION RESPIRATORY (INHALATION) at 20:15

## 2021-10-28 RX ADMIN — POTASSIUM & SODIUM PHOSPHATES POWDER PACK 280-160-250 MG 1 PACKET: 280-160-250 PACK at 21:30

## 2021-10-28 RX ADMIN — AZITHROMYCIN MONOHYDRATE 500 MG: 500 INJECTION, POWDER, LYOPHILIZED, FOR SOLUTION INTRAVENOUS at 15:02

## 2021-10-28 RX ADMIN — CARBAMAZEPINE 150 MG: 100 SUSPENSION ORAL at 23:38

## 2021-10-28 RX ADMIN — CEFTRIAXONE SODIUM 2 G: 2 INJECTION, POWDER, FOR SOLUTION INTRAMUSCULAR; INTRAVENOUS at 14:07

## 2021-10-28 ASSESSMENT — ACTIVITIES OF DAILY LIVING (ADL)
ADLS_ACUITY_SCORE: 20
EATING/SWALLOWING_MANAGEMENT: G TUBE
WEAR_GLASSES_OR_BLIND: NO
HEARING_DIFFICULTY_OR_DEAF: NO
EATING/SWALLOWING: EATING;SWALLOWING LIQUIDS;SWALLOWING SOLID FOOD
ADLS_ACUITY_SCORE: 24
TOILETING_MANAGEMENT: INCONTINENT
ADLS_ACUITY_SCORE: 20
ADLS_ACUITY_SCORE: 28
TOILETING_ISSUES: YES
DIFFICULTY_EATING/SWALLOWING: YES
TOILETING_ASSISTANCE: TOILETING DIFFICULTY, DEPENDENT
WALKING_OR_CLIMBING_STAIRS: TRANSFERRING DIFFICULTY, ASSISTANCE 1 PERSON;TRANSFERRING DIFFICULTY, REQUIRES EQUIPMENT
COMMUNICATION: DIFFICULTY SPEAKING;DIFFICULTY UNDERSTANDING
WALKING_OR_CLIMBING_STAIRS_DIFFICULTY: YES
CONCENTRATING,_REMEMBERING_OR_MAKING_DECISIONS_DIFFICULTY: YES
ADLS_ACUITY_SCORE: 28
ADLS_ACUITY_SCORE: 12
ADLS_ACUITY_SCORE: 12
DRESSING/BATHING: BATHING DIFFICULTY, ASSISTANCE 1 PERSON;DRESSING DIFFICULTY, ASSISTANCE 1 PERSON
FALL_HISTORY_WITHIN_LAST_SIX_MONTHS: NO
DOING_ERRANDS_INDEPENDENTLY_DIFFICULTY: YES
ADLS_ACUITY_SCORE: 28
DIFFICULTY_COMMUNICATING: YES
DRESSING/BATHING_DIFFICULTY: YES
ADLS_ACUITY_SCORE: 28

## 2021-10-28 ASSESSMENT — ENCOUNTER SYMPTOMS
SHORTNESS OF BREATH: 0
ABDOMINAL PAIN: 0
HEADACHES: 1
FEVER: 1

## 2021-10-28 ASSESSMENT — MIFFLIN-ST. JEOR: SCORE: 1622.06
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
## 2021-10-28 NOTE — ED TRIAGE NOTES
Hx tbi, stroke, seizures. Lives at home with mom. Mom reports increased temp (100.0 axillary) X 2 days and increased sputum production with some fatigue

## 2021-10-28 NOTE — PROGRESS NOTES
RECEIVING UNIT ED HANDOFF REVIEW    ED Nurse Handoff Report was reviewed by: Brissa Alvarez RN on October 28, 2021 at 3:50 PM
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
## 2021-10-28 NOTE — ED PROVIDER NOTES
History   Chief Complaint:  Fever     The history is provided by the EMS personnel. The history is limited by the condition of the patient.      Keyon Farias is a 61 year old male with history of TBI, DVT, stroke with aphasia and hemiparesis, ventricular fibrillation, septic shock, pneumonia and necrotizing pancreatitis who presents with a fever. EMS reports that for the past few days the patient has had an increasing fever and worsening congestion, causing his mother to call. The patient had an axillary temperature of 100 at home and a temp of 100.5 with EMS. In the emergency department the patient mentions having a headache. Per chart review, the patient has been seen several times in the past, most recently on 10/04/2021 for sepsis secondary from pneumonia. The patient was treated with ceftriaxone and azithromycin and discharged home on 10/06/2021 The patient denies any pain, shortness of breath, chest pain or abdominal pain, but is unable to provide any further history.     Review of Systems   Constitutional: Positive for fever.   Respiratory: Negative for shortness of breath.    Cardiovascular: Negative for chest pain.   Gastrointestinal: Negative for abdominal pain.   Neurological: Positive for headaches.   All other systems reviewed and are negative.    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  DVT  GERD  Panhypopituitarism  Pneumonia  Seizures  Sepsis due to UTI  Septic shock  Spastic hemiplegia   Thyroid disease   Traumatic brain injury  Stroke  Ventricular fibrillation  Ventricular tachyarrhythmia   Appendicitis  Necrotizing pancreatitis  Encephalopathy  Cardiac arrest   Conjunctivitis  Esophagitis      Past Surgical History:    Upper GI endoscopic ultrasound  Esophagoscopy, gastroscopy, duodenoscopy,  combined x4  Reconstructive facial surgery  Jejunostomy tube change x14  Appendectomy  Picc exchange  Right hand repair  Tracheostomy  Vascular surgery  Contracture release x2  Dental extractions     Family History:    Father: cancer     Social History:  The patient presents alone.  He lives at home with his mother who cares for him.     Physical Exam     Patient Vitals for the past 24 hrs:   BP Temp Temp src Pulse Resp SpO2   10/28/21 1400 115/76 -- -- 81 -- 97 %   10/28/21 1330 126/75 -- -- 88 -- 96 %   10/28/21 1239 -- -- -- -- -- 94 %   10/28/21 1237 -- -- -- 104 -- (!) 89 %   10/28/21 1233 124/78 99.6  F (37.6  C) Oral 95 28 92 %     Physical Exam  SKIN:  Warm, dry.  No jaundice.  No rash.  HEMATOLOGIC/IMMUNOLOGIC/LYMPHATIC:  No pallor.  No limb edema.  HENT:  No stridor.  EYES:  Conjunctivae normal.  Anicteric.  CARDIOVASCULAR: Tachycardic rate with regular rhythm.  No murmur.  No rub.  RESPIRATORY:  No respiratory distress, breath sounds equal and normal.  GASTROINTESTINAL:  Soft, nontender abdomen.  Distention.  MUSCULOSKELETAL: Normal body habitus.  NEUROLOGIC:  Alert, answers questions nodding head yes or no.  PSYCHIATRIC:  Normal mood.    Emergency Department Course   ECG  ECG obtained at 1357, ECG read at 1405  Normal sinus rhythm  Left axis deviation   No significant change as compared to prior, dated 09/30/2021.  Rate 82 bpm. ND interval 150 ms. QRS duration 88 ms. QT/QTc 398/464 ms. P-R-T axes 41 -42 28.     Imaging:  XR Chest Port 1 View  Left basilar retrocardiac linear and patchy opacities have  slightly increased, suspicious for pneumonia. The right lung base  linear opacities are stable and could represent fibrosis, or  atelectasis. No pleural effusion or pneumothorax. Normal heart size.  Reading per radiology.    Laboratory:  CBC: WBC 9.0, HGB 13.9,  (L)   BMP: Chloride 112 (H), Glucose 119 (H) o/w WNL (Creatinine 0.81)     Lactic acid (result time 1309) 1.5   UA with microscopic: pH 8.5  (H), Protein Albumin 50 o/w WNL     Procalcitonin: Pending    Symptomatic Influenza A/B & SARS-CoV2 (COVID19) Virus PCR Multiplex: Negative     Blood Culture x2: Pending      Emergency Department Course:  Reviewed:  I reviewed nursing notes, vitals, past medical history and Care Everywhere    Assessments:  1240 I obtained history and examined the patient as noted above.   1425 I rechecked the patient.     Consults:  1418 I spoke with Dr. Connors, hospitalist, who agreed to admit the patient.     Interventions:  1315 NS, 1 L, IV bolus   1407 Rocephin 2 g IV   Zithromax 500 mg IV     Disposition:  The patient was admitted to the hospital under the care of Dr. Connors .     Impression & Plan     Medical Decision Making:  This patient presents with a febrile illness.  Given the mild hypoxia I considered most likely this is respiratory in origin.  Considered COVID-19.  Imaging consistent with infiltrates of pneumonia.  Opted for community acquired pneumonia coverage including Rocephin and azithromycin.  Hemodynamically stable.  Urinalysis negative.  Negative lactic acid so I doubt sepsis at this time.  He will be admitted for care.    Diagnosis:    ICD-10-CM    1. Pneumonia due to infectious organism, unspecified laterality, unspecified part of lung  J18.9        Scribe Disclosure:  I, Steve Edgar, am serving as a scribe at 12:36 PM on 10/28/2021 to document services personally performed by Too Rodarte MD based on my observations and the provider's statements to me.              Too Rodarte MD  10/28/21 [id number]

## 2021-10-28 NOTE — ED NOTES
Bed: ED15  Expected date:   Expected time:   Means of arrival:   Comments:  E1  61 M elevated temp  1227

## 2021-10-29 LAB
ALBUMIN SERPL-MCNC: 2.7 G/DL (ref 3.4–5)
ALP SERPL-CCNC: 87 U/L (ref 40–150)
ALT SERPL W P-5'-P-CCNC: 23 U/L (ref 0–70)
ANION GAP SERPL CALCULATED.3IONS-SCNC: 5 MMOL/L (ref 3–14)
AST SERPL W P-5'-P-CCNC: 18 U/L (ref 0–45)
ATRIAL RATE - MUSE: 82 BPM
BILIRUB SERPL-MCNC: 0.5 MG/DL (ref 0.2–1.3)
BUN SERPL-MCNC: 15 MG/DL (ref 7–30)
CALCIUM SERPL-MCNC: 8.2 MG/DL (ref 8.5–10.1)
CHLORIDE BLD-SCNC: 113 MMOL/L (ref 94–109)
CO2 SERPL-SCNC: 26 MMOL/L (ref 20–32)
CREAT SERPL-MCNC: 0.93 MG/DL (ref 0.66–1.25)
DIASTOLIC BLOOD PRESSURE - MUSE: NORMAL MMHG
ERYTHROCYTE [DISTWIDTH] IN BLOOD BY AUTOMATED COUNT: 14.5 % (ref 10–15)
GFR SERPL CREATININE-BSD FRML MDRD: 90 ML/MIN/1.73M2
GLUCOSE BLD-MCNC: 103 MG/DL (ref 70–99)
HCT VFR BLD AUTO: 38.5 % (ref 40–53)
HGB BLD-MCNC: 12.3 G/DL (ref 13.3–17.7)
INTERPRETATION ECG - MUSE: NORMAL
MCH RBC QN AUTO: 28.9 PG (ref 26.5–33)
MCHC RBC AUTO-ENTMCNC: 31.9 G/DL (ref 31.5–36.5)
MCV RBC AUTO: 90 FL (ref 78–100)
P AXIS - MUSE: 41 DEGREES
PLATELET # BLD AUTO: 110 10E3/UL (ref 150–450)
POTASSIUM BLD-SCNC: 3.8 MMOL/L (ref 3.4–5.3)
PR INTERVAL - MUSE: 150 MS
PROT SERPL-MCNC: 6.8 G/DL (ref 6.8–8.8)
QRS DURATION - MUSE: 88 MS
QT - MUSE: 398 MS
QTC - MUSE: 464 MS
R AXIS - MUSE: -42 DEGREES
RBC # BLD AUTO: 4.26 10E6/UL (ref 4.4–5.9)
SODIUM SERPL-SCNC: 144 MMOL/L (ref 133–144)
SYSTOLIC BLOOD PRESSURE - MUSE: NORMAL MMHG
T AXIS - MUSE: 28 DEGREES
VENTRICULAR RATE- MUSE: 82 BPM
WBC # BLD AUTO: 7.4 10E3/UL (ref 4–11)

## 2021-10-29 PROCEDURE — 250N000011 HC RX IP 250 OP 636: Performed by: HOSPITALIST

## 2021-10-29 PROCEDURE — 999N000128 HC STATISTIC PERIPHERAL IV START W/O US GUIDANCE

## 2021-10-29 PROCEDURE — 999N000157 HC STATISTIC RCP TIME EA 10 MIN

## 2021-10-29 PROCEDURE — 250N000013 HC RX MED GY IP 250 OP 250 PS 637: Performed by: HOSPITALIST

## 2021-10-29 PROCEDURE — 99232 SBSQ HOSP IP/OBS MODERATE 35: CPT | Performed by: STUDENT IN AN ORGANIZED HEALTH CARE EDUCATION/TRAINING PROGRAM

## 2021-10-29 PROCEDURE — 94640 AIRWAY INHALATION TREATMENT: CPT

## 2021-10-29 PROCEDURE — 80053 COMPREHEN METABOLIC PANEL: CPT | Performed by: HOSPITALIST

## 2021-10-29 PROCEDURE — 85027 COMPLETE CBC AUTOMATED: CPT | Performed by: HOSPITALIST

## 2021-10-29 PROCEDURE — 94640 AIRWAY INHALATION TREATMENT: CPT | Mod: 76

## 2021-10-29 PROCEDURE — 250N000009 HC RX 250: Performed by: HOSPITALIST

## 2021-10-29 PROCEDURE — 120N000001 HC R&B MED SURG/OB

## 2021-10-29 PROCEDURE — 36415 COLL VENOUS BLD VENIPUNCTURE: CPT | Performed by: HOSPITALIST

## 2021-10-29 RX ORDER — GUAIFENESIN 600 MG/1
15 TABLET, EXTENDED RELEASE ORAL DAILY
Status: DISCONTINUED | OUTPATIENT
Start: 2021-10-30 | End: 2021-10-30 | Stop reason: HOSPADM

## 2021-10-29 RX ORDER — DEXTROSE MONOHYDRATE 100 MG/ML
INJECTION, SOLUTION INTRAVENOUS CONTINUOUS PRN
Status: DISCONTINUED | OUTPATIENT
Start: 2021-10-29 | End: 2021-10-29

## 2021-10-29 RX ORDER — POLYETHYLENE GLYCOL 3350 17 G/17G
17 POWDER, FOR SOLUTION ORAL DAILY PRN
Status: DISCONTINUED | OUTPATIENT
Start: 2021-10-29 | End: 2021-10-30 | Stop reason: HOSPADM

## 2021-10-29 RX ADMIN — SODIUM BICARBONATE 650 MG TABLET 650 MG: at 08:06

## 2021-10-29 RX ADMIN — METOCLOPRAMIDE HYDROCHLORIDE 10 MG: 5 SOLUTION ORAL at 08:04

## 2021-10-29 RX ADMIN — ALBUTEROL SULFATE 2.5 MG: 2.5 SOLUTION RESPIRATORY (INHALATION) at 19:50

## 2021-10-29 RX ADMIN — HYDROCORTISONE 15 MG: 10 TABLET ORAL at 08:04

## 2021-10-29 RX ADMIN — CALCIUM CARBONATE 1250 MG: 1250 SUSPENSION ORAL at 21:27

## 2021-10-29 RX ADMIN — METOCLOPRAMIDE HYDROCHLORIDE 10 MG: 5 SOLUTION ORAL at 17:19

## 2021-10-29 RX ADMIN — POTASSIUM & SODIUM PHOSPHATES POWDER PACK 280-160-250 MG 1 PACKET: 280-160-250 PACK at 21:27

## 2021-10-29 RX ADMIN — CARBAMAZEPINE 150 MG: 100 SUSPENSION ORAL at 06:47

## 2021-10-29 RX ADMIN — ALBUTEROL SULFATE 2.5 MG: 2.5 SOLUTION RESPIRATORY (INHALATION) at 11:20

## 2021-10-29 RX ADMIN — THERA TABS 1 TABLET: TAB at 08:04

## 2021-10-29 RX ADMIN — CARBAMAZEPINE 100 MG: 100 SUSPENSION ORAL at 21:45

## 2021-10-29 RX ADMIN — BRIVARACETAM 100 MG: 10 SOLUTION ORAL at 21:27

## 2021-10-29 RX ADMIN — POTASSIUM & SODIUM PHOSPHATES POWDER PACK 280-160-250 MG 1 PACKET: 280-160-250 PACK at 08:06

## 2021-10-29 RX ADMIN — LEVOTHYROXINE SODIUM 150 MCG: 75 TABLET ORAL at 06:47

## 2021-10-29 RX ADMIN — CALCIUM CARBONATE 1250 MG: 1250 SUSPENSION ORAL at 17:20

## 2021-10-29 RX ADMIN — PIPERACILLIN SODIUM AND TAZOBACTAM SODIUM 3.38 G: 3; .375 INJECTION, POWDER, LYOPHILIZED, FOR SOLUTION INTRAVENOUS at 08:07

## 2021-10-29 RX ADMIN — Medication 40 MG: at 06:47

## 2021-10-29 RX ADMIN — ALBUTEROL SULFATE 2.5 MG: 2.5 SOLUTION RESPIRATORY (INHALATION) at 15:55

## 2021-10-29 RX ADMIN — ACETYLCYSTEINE 2 ML: 200 SOLUTION ORAL; RESPIRATORY (INHALATION) at 07:24

## 2021-10-29 RX ADMIN — PIPERACILLIN SODIUM AND TAZOBACTAM SODIUM 3.38 G: 3; .375 INJECTION, POWDER, LYOPHILIZED, FOR SOLUTION INTRAVENOUS at 13:36

## 2021-10-29 RX ADMIN — Medication 50 MCG: at 08:04

## 2021-10-29 RX ADMIN — BRIVARACETAM 100 MG: 10 SOLUTION ORAL at 08:23

## 2021-10-29 RX ADMIN — ASPIRIN 81 MG CHEWABLE TABLET 81 MG: 81 TABLET CHEWABLE at 08:06

## 2021-10-29 RX ADMIN — PIPERACILLIN SODIUM AND TAZOBACTAM SODIUM 3.38 G: 3; .375 INJECTION, POWDER, LYOPHILIZED, FOR SOLUTION INTRAVENOUS at 02:25

## 2021-10-29 RX ADMIN — ACETYLCYSTEINE 2 ML: 200 SOLUTION ORAL; RESPIRATORY (INHALATION) at 15:55

## 2021-10-29 RX ADMIN — ACETYLCYSTEINE 2 ML: 200 SOLUTION ORAL; RESPIRATORY (INHALATION) at 11:20

## 2021-10-29 RX ADMIN — ALBUTEROL SULFATE 2.5 MG: 2.5 SOLUTION RESPIRATORY (INHALATION) at 07:24

## 2021-10-29 RX ADMIN — METOCLOPRAMIDE HYDROCHLORIDE 10 MG: 5 SOLUTION ORAL at 21:27

## 2021-10-29 RX ADMIN — CARBAMAZEPINE 150 MG: 100 SUSPENSION ORAL at 13:36

## 2021-10-29 RX ADMIN — PIPERACILLIN SODIUM AND TAZOBACTAM SODIUM 3.38 G: 3; .375 INJECTION, POWDER, LYOPHILIZED, FOR SOLUTION INTRAVENOUS at 22:56

## 2021-10-29 RX ADMIN — Medication 1 PACKET: at 13:36

## 2021-10-29 RX ADMIN — CALCIUM CARBONATE 1250 MG: 1250 SUSPENSION ORAL at 08:06

## 2021-10-29 RX ADMIN — POTASSIUM & SODIUM PHOSPHATES POWDER PACK 280-160-250 MG 1 PACKET: 280-160-250 PACK at 17:19

## 2021-10-29 RX ADMIN — OXYCODONE HYDROCHLORIDE AND ACETAMINOPHEN 1000 MG: 500 TABLET ORAL at 08:06

## 2021-10-29 RX ADMIN — METOCLOPRAMIDE HYDROCHLORIDE 10 MG: 5 SOLUTION ORAL at 13:38

## 2021-10-29 RX ADMIN — ACETYLCYSTEINE 2 ML: 200 SOLUTION ORAL; RESPIRATORY (INHALATION) at 19:50

## 2021-10-29 ASSESSMENT — ACTIVITIES OF DAILY LIVING (ADL)
ADLS_ACUITY_SCORE: 34
ADLS_ACUITY_SCORE: 28
ADLS_ACUITY_SCORE: 28
ADLS_ACUITY_SCORE: 34
ADLS_ACUITY_SCORE: 28
ADLS_ACUITY_SCORE: 36
ADLS_ACUITY_SCORE: 28
ADLS_ACUITY_SCORE: 34
ADLS_ACUITY_SCORE: 34
ADLS_ACUITY_SCORE: 28
ADLS_ACUITY_SCORE: 28
ADLS_ACUITY_SCORE: 34
ADLS_ACUITY_SCORE: 34
ADLS_ACUITY_SCORE: 36
ADLS_ACUITY_SCORE: 28
ADLS_ACUITY_SCORE: 28
ADLS_ACUITY_SCORE: 34

## 2021-10-29 ASSESSMENT — MIFFLIN-ST. JEOR: SCORE: 1621.06

## 2021-10-29 NOTE — PLAN OF CARE
Summary:    DATE & TIME: 10/28/21-10/29/21 9286-9243   Cognitive Concerns/ Orientation : Non-verbal, Hx TBI   BEHAVIOR & AGGRESSION TOOL COLOR: green  CIWA SCORE: na   ABNL VS/O2: VSS.  Was on 2L, kept taking off and on room air mid 90s  MOBILITY: Total lift  PAIN MANAGMENT: no non-verbal signs noted  DIET: NPO.  Tube feeds at baseline-order for dietary to order TF today  BOWEL/BLADDER: incont B&B  ABNL LAB/BG: na  DRAIN/DEVICES: PIV L hand  TELEMETRY RHYTHM: na  SKIN: G tube site  TESTS/PROCEDURES: none  D/C DAY/GOALS/PLACE: pending  OTHER IMPORTANT INFO: Turned q2h.  Scheduled nebs.  IV antibiotics.  Yankeur suctioned x1
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
## 2021-10-29 NOTE — PLAN OF CARE
Summary:    DATE & TIME: 10/28/21 3614-5458   Cognitive Concerns/ Orientation : Non-verbal, Hx TBI   BEHAVIOR & AGGRESSION TOOL COLOR: green  CIWA SCORE: na   ABNL VS/O2: VSS.  Was on 2L, kept taking off and on room air >92%  MOBILITY: Total lift  PAIN MANAGMENT: no non-verbal signs noted  DIET: NPO.  Tube feeds at baseline-order for dietary to order TF tomorrow  BOWEL/BLADDER: incont B&B  ABNL LAB/BG: na  DRAIN/DEVICES: PIV L hand  TELEMETRY RHYTHM: na  SKIN: G tube site  TESTS/PROCEDURES: none  D/C DAY/GOALS/PLACE: pending  OTHER IMPORTANT INFO: Turned q2h.  Scheduled nebs.  IV antibiotics.  Yankeur suctioned with RT

## 2021-10-29 NOTE — CONSULTS
"CLINICAL NUTRITION SERVICES  -  ASSESSMENT NOTE    Recommendations Ordered by Registered Dietitian (RD):   Type of Feeding Tube: GJ tube (feed into J-port)  Enteral Frequency:  Continuous  Enteral Regimen: Jevity 1.5 @ 55 mL/hr   Total Enteral Provisions: 1320ml/day will provide: 1980 kcals (26 kcal/kg), 84 g PRO (1.1 g/kg), 1003 ml free H20, 284 g CHO, and 27 g fiber daily.  Add 1 pkt Nutrisource Fiber daily (15 kcal, 4 g fiber)     Free Water Flush: 160 mL every 4 hrs while IVF running  Begin @ 30 ml/hr. Advance to 55 ml/hr after 4 hours.   Malnutrition:   % Weight Loss:  None noted  % Intake:  No decreased intake noted  Subcutaneous Fat Loss:  Deferred - not suspected  Muscle Loss:  Deferred - not suspected   Fluid Retention:  None noted    Malnutrition Diagnosis: Patient does not meet two of the above criteria necessary for diagnosing malnutrition     REASON FOR ASSESSMENT  Keyon Farias is a 61 year old male seen by Registered Dietitian for Provider Order - Registered Dietitian to Assess and Order TF per Medical Nutrition Therapy Protocol    NUTRITION HISTORY  - Patient is well known to our service. He lives at home with his mother Savannah who acts as his caregiver.   - He relies solely on enteral nutrition to meet his nutrient and fluid needs.   - Seen here most recently on 8/10/21. Enteral regimen as follows:     Jevity 1.5 @ 55 ml/hr x 24 hours  Total 3295-5208 mL free water   1 Scoop Benefiber daily   1/8 tsp table salt BID (@ 3 pm & 12 am)    CURRENT NUTRITION ORDERS  Diet Order:     NPO     Current Intake/Tolerance:  N/A    NUTRITION FOCUSED PHYSICAL ASSESSMENT FOR DIAGNOSING MALNUTRITION)  No:  Deferred    Obtained from Chart/Interdisciplinary Team:  Ricci - Nutrition 3; Total 13    ANTHROPOMETRICS  Height: 5' 11.5\"  Weight: 171 lbs 1.23 oz (77.6 kg)   Body mass index is 23.53 kg/m .  Weight Status:  Normal BMI  Weight History: Weight is stable from August admission. Trending slightly up over the past 1 " year.     Wt Readings from Last 10 Encounters:   10/29/21 77.6 kg (171 lb 1.2 oz)   08/10/21 78.8 kg (173 lb 11.6 oz)   05/24/21 80.1 kg (176 lb 8 oz)   04/16/21 76 kg (167 lb 8.8 oz)   02/22/21 74.8 kg (165 lb)   01/18/21 74.2 kg (163 lb 9.3 oz)   12/20/20 77.2 kg (170 lb 1.6 oz)   11/04/20 71 kg (156 lb 8.4 oz)   10/25/20 68.9 kg (151 lb 12.8 oz)   10/23/20 72.6 kg (160 lb)       LABS  Labs reviewed    MEDICATIONS  Medications reviewed  Nutrisource Fiber 1 pkt daily   Reglan  Thera vit (w/o minerals) daily   NeutraPhos 1 pkt TID - per home regimen  Vitamin D3 2000 units daily     ASSESSED NUTRITION NEEDS PER APPROVED PRACTICE GUIDELINES:  Dosing Weight 77 kg   Estimated Energy Needs: 8951-9629 kcals (25-30 Kcal/Kg)  Justification: maintenance  Estimated Protein Needs:  grams protein (1.2-1.5 g pro/Kg)  Justification: maintenance     MALNUTRITION:  % Weight Loss:  None noted  % Intake:  No decreased intake noted  Subcutaneous Fat Loss:  Deferred - not suspected  Muscle Loss:  Deferred - not suspected   Fluid Retention:  None noted    Malnutrition Diagnosis: Patient does not meet two of the above criteria necessary for diagnosing malnutrition    NUTRITION DIAGNOSIS:  No nutrition diagnosis identified at this time    NUTRITION INTERVENTIONS  Recommendations / Nutrition Prescription  Type of Feeding Tube: GJ tube (feed into J-port)  Enteral Frequency:  Continuous  Enteral Regimen: Jevity 1.5 @ 55 mL/hr   Total Enteral Provisions: 1320ml/day will provide: 1980 kcals (26 kcal/kg), 84 g PRO (1.1 g/kg), 1003 ml free H20, 284 g CHO, and 27 g fiber daily.  Add 1 pkt Nutrisource Fiber daily (15 kcal, 4 g fiber)     Free Water Flush: 160 mL every 4 hrs while IVF running  Begin @ 30 ml/hr. Advance to 55 ml/hr after 4 hours.      Implementation  Nutrition education: Per Provider order if indicated   EN Composition, EN Schedule and Feeding Tube Flush: ordered in Epic      Nutrition Goals  TF @ goal to meet %  estimated needs.       MONITORING AND EVALUATION:  Progress towards goals will be monitored and evaluated per protocol and Practice Guidelines    Marisel Fernández RD, LD  [facility] [place], [facility], [facility], [facility]  Pager: [phone]  Weekend Pager: [phone]

## 2021-10-29 NOTE — CONSULTS
Care Management Initial Consult    General Information  Assessment completed with: Parents, Mother Savannah  Type of CM/SW Visit: Offer D/C Planning    Primary Care Provider verified and updated as needed: Yes   Readmission within the last 30 days: lack of support   Return Category: Exacerbation of disease  Reason for Consult: discharge planning  Advance Care Planning:            Communication Assessment  Patient's communication style: spoken language (English or Bilingual)    Hearing Difficulty or Deaf: no   Wear Glasses or Blind: no    Cognitive  Cognitive/Neuro/Behavioral: .WDL except, all  Level of Consciousness: other (see comments) (non verbal)  Arousal Level: arouses to pain, opens eyes spontaneously  Orientation: other (see comments) (non verbal)  Mood/Behavior: cooperative     Speech: HECTOR (unable to assess)    Living Environment:   People in home: parent(s)     Current living Arrangements: house      Able to return to prior arrangements: yes       Family/Social Support:  Care provided by: parent(s), homecare agency  Provides care for: no one, unable/limited ability to care for self  Marital Status: Single  Parent(s)          Description of Support System: Involved    Support Assessment: Adequate family and caregiver support, Lacks adequate physical care, Other (see comments)    Current Resources:   Patient receiving home care services: Yes  Skilled Home Care Services: Skilled Nursing, Home Health Aid  Community Resources: Home Care, PCA  Equipment currently used at home: commode chair, grab bar, toilet, grab bar, tub/shower, wheelchair, power, wheelchair, manual  Supplies currently used at home:      Employment/Financial:  Employment Status: disabled        Financial Concerns: No concerns identified           Lifestyle & Psychosocial Needs:  Social Determinants of Health     Tobacco Use: Medium Risk     Smoking Tobacco Use: Former Smoker     Smokeless Tobacco Use: Never Used   Alcohol Use:      Frequency of  Alcohol Consumption:      Average Number of Drinks:      Frequency of Binge Drinking:    Financial Resource Strain:      Difficulty of Paying Living Expenses:    Food Insecurity:      Worried About Running Out of Food in the Last Year:      Ran Out of Food in the Last Year:    Transportation Needs:      Lack of Transportation (Medical):      Lack of Transportation (Non-Medical):    Physical Activity:      Days of Exercise per Week:      Minutes of Exercise per Session:    Stress:      Feeling of Stress :    Social Connections:      Frequency of Communication with Friends and Family:      Frequency of Social Gatherings with Friends and Family:      Attends Christianity Services:      Active Member of Clubs or Organizations:      Attends Club or Organization Meetings:      Marital Status:    Intimate Partner Violence:      Fear of Current or Ex-Partner:      Emotionally Abused:      Physically Abused:      Sexually Abused:    Depression: Not at risk     PHQ-2 Score: 0   Housing Stability:      Unable to Pay for Housing in the Last Year:      Number of Places Lived in the Last Year:      Unstable Housing in the Last Year:        Functional Status:  Prior to admission patient needed assistance:   Dependent ADLs:: Bathing, Dressing, Eating, Grooming, Incontinence, Positioning, Transfers  Dependent IADLs:: Cooking, Medication Management, Money Management, Transportation, Incontinence       Mental Health Status:  Mental Health Status: No Current Concerns       Chemical Dependency Status:  Chemical Dependency Status: No Current Concerns             Values/Beliefs:  Spiritual, Cultural Beliefs, Christianity Practices, Values that affect care: no               Additional Information:  Per Savannah (Mother), pt has the following services in place:[facility] (phone [phone], fax [phone])  Approved for 12 hours RN coverage daily, but unable to get the help, so pt only has a 12 hr nurse on Wednesday and Thursday in the  past  Currently has none  .  His mother fulfills the rest of the care    [facility] (12 hours PCA evening / night)   [facility]  For TF supplies  [facility] for Tube Feeding    Endocrine from the [place], Dr Faizan Roth.  Primary Physician Carlos Bangura [phone]      Per MD patient will be ready for discharge tomorrow and patients mother Savannah aware of this.   [facility] Transportation set for 12 noon  10/30 via [employer].          Anita Casillas RN  Inpatient Care Coordinator  [facility]  #  [phone]

## 2021-10-29 NOTE — PLAN OF CARE
Cognitive Concerns/ Orientation : alert, baseline moaning with staff interaction  BEHAVIOR & AGGRESSION TOOL COLOR: green  CIWA SCORE: na   ABNL VS/O2: VSS, afebrile    MOBILITY: Total lift, turn and reposition Q2hr  PAIN MANAGMENT: no signs of pain  DIET: Tube feeding running at 55 ml/hr, NPO   BOWEL/BLADDER: incont B&B, one loose yellow/brown BM  ABNL LAB/BG: na  DRAIN/DEVICES: PIV L hand  TELEMETRY RHYTHM: na  SKIN: G tube site is red-cleaned and drainage 2x2 applied to the site  TESTS/PROCEDURES: none  D/C DAY/GOALS/PLACE: tomorrow 10/30/21 at 12pm to home   OTHER IMPORTANT INFO: receiving IV abx. Congested cough. Oral cares provided. Diminished lung sounds. Mother updated.

## 2021-10-29 NOTE — PLAN OF CARE
Summary:    DATE & TIME: 10/29/21 AM   Cognitive Concerns/ Orientation : Alert, moans when interacted with,Non-verbal, Hx TBI   BEHAVIOR & AGGRESSION TOOL COLOR: green  CIWA SCORE: na   ABNL VS/O2: VSS., afebrile    MOBILITY: Total lift, turn and reposition Q2hr  PAIN MANAGMENT: no non-verbal signs noted  DIET: Tube feedings started today at 1330 rate at 30 ml/hr increase to 55 ml/hr after 2 hours, NPO,    BOWEL/BLADDER: incont B&B, No BMS  ABNL LAB/BG: na  DRAIN/DEVICES: PIV L hand  TELEMETRY RHYTHM: na  SKIN: G tube site, thin purulent drainage, cleaned and dressing applied.   TESTS/PROCEDURES: none  D/C DAY/GOALS/PLACE: tomorrow 10/30/21 at 12pm to home   OTHER IMPORTANT INFO: Turned q2h.  Scheduled nebs, IV abx.

## 2021-10-30 VITALS
OXYGEN SATURATION: 94 % | BODY MASS INDEX: 22.46 KG/M2 | DIASTOLIC BLOOD PRESSURE: 53 MMHG | WEIGHT: 165.79 LBS | HEIGHT: 72 IN | RESPIRATION RATE: 18 BRPM | TEMPERATURE: 97.7 F | HEART RATE: 66 BPM | SYSTOLIC BLOOD PRESSURE: 100 MMHG

## 2021-10-30 PROCEDURE — 94640 AIRWAY INHALATION TREATMENT: CPT

## 2021-10-30 PROCEDURE — 94640 AIRWAY INHALATION TREATMENT: CPT | Mod: 76

## 2021-10-30 PROCEDURE — 250N000009 HC RX 250: Performed by: HOSPITALIST

## 2021-10-30 PROCEDURE — 250N000011 HC RX IP 250 OP 636: Performed by: HOSPITALIST

## 2021-10-30 PROCEDURE — 999N000157 HC STATISTIC RCP TIME EA 10 MIN: Mod: 76

## 2021-10-30 PROCEDURE — 99239 HOSP IP/OBS DSCHRG MGMT >30: CPT | Performed by: STUDENT IN AN ORGANIZED HEALTH CARE EDUCATION/TRAINING PROGRAM

## 2021-10-30 PROCEDURE — 250N000011 HC RX IP 250 OP 636: Performed by: STUDENT IN AN ORGANIZED HEALTH CARE EDUCATION/TRAINING PROGRAM

## 2021-10-30 PROCEDURE — 90682 RIV4 VACC RECOMBINANT DNA IM: CPT | Performed by: STUDENT IN AN ORGANIZED HEALTH CARE EDUCATION/TRAINING PROGRAM

## 2021-10-30 PROCEDURE — 250N000013 HC RX MED GY IP 250 OP 250 PS 637: Performed by: STUDENT IN AN ORGANIZED HEALTH CARE EDUCATION/TRAINING PROGRAM

## 2021-10-30 PROCEDURE — G0008 ADMIN INFLUENZA VIRUS VAC: HCPCS | Performed by: STUDENT IN AN ORGANIZED HEALTH CARE EDUCATION/TRAINING PROGRAM

## 2021-10-30 PROCEDURE — 250N000013 HC RX MED GY IP 250 OP 250 PS 637: Performed by: HOSPITALIST

## 2021-10-30 RX ORDER — AMOXICILLIN AND CLAVULANATE POTASSIUM 400; 57 MG/5ML; MG/5ML
875 POWDER, FOR SUSPENSION ORAL EVERY 12 HOURS
Qty: 174.4 ML | Refills: 0 | Status: SHIPPED | OUTPATIENT
Start: 2021-10-30 | End: 2021-11-07

## 2021-10-30 RX ORDER — AMOXICILLIN AND CLAVULANATE POTASSIUM 400; 57 MG/5ML; MG/5ML
875 POWDER, FOR SUSPENSION ORAL EVERY 12 HOURS SCHEDULED
Status: DISCONTINUED | OUTPATIENT
Start: 2021-10-30 | End: 2021-10-30 | Stop reason: HOSPADM

## 2021-10-30 RX ADMIN — SODIUM BICARBONATE 650 MG TABLET 650 MG: at 08:19

## 2021-10-30 RX ADMIN — INFLUENZA A VIRUS A/WISCONSIN/588/2019 (H1N1) RECOMBINANT HEMAGGLUTININ ANTIGEN, INFLUENZA A VIRUS A/TASMANIA/503/2020 (H3N2) RECOMBINANT HEMAGGLUTININ ANTIGEN, INFLUENZA B VIRUS B/WASHINGTON/02/2019 RECOMBINANT HEMAGGLUTININ ANTIGEN, AND INFLUENZA B VIRUS B/PHUKET/3073/2013 RECOMBINANT HEMAGGLUTININ ANTIGEN 0.5 ML: 45; 45; 45; 45 INJECTION INTRAMUSCULAR at 09:00

## 2021-10-30 RX ADMIN — CARBAMAZEPINE 150 MG: 100 SUSPENSION ORAL at 01:45

## 2021-10-30 RX ADMIN — Medication 50 MCG: at 10:36

## 2021-10-30 RX ADMIN — POTASSIUM & SODIUM PHOSPHATES POWDER PACK 280-160-250 MG 1 PACKET: 280-160-250 PACK at 08:18

## 2021-10-30 RX ADMIN — ACETYLCYSTEINE 2 ML: 200 SOLUTION ORAL; RESPIRATORY (INHALATION) at 11:58

## 2021-10-30 RX ADMIN — ASPIRIN 81 MG CHEWABLE TABLET 81 MG: 81 TABLET CHEWABLE at 08:19

## 2021-10-30 RX ADMIN — ALBUTEROL SULFATE 2.5 MG: 2.5 SOLUTION RESPIRATORY (INHALATION) at 08:08

## 2021-10-30 RX ADMIN — CARBAMAZEPINE 150 MG: 100 SUSPENSION ORAL at 08:20

## 2021-10-30 RX ADMIN — CARBAMAZEPINE 150 MG: 100 SUSPENSION ORAL at 10:36

## 2021-10-30 RX ADMIN — BRIVARACETAM 100 MG: 10 SOLUTION ORAL at 08:53

## 2021-10-30 RX ADMIN — PIPERACILLIN SODIUM AND TAZOBACTAM SODIUM 3.38 G: 3; .375 INJECTION, POWDER, LYOPHILIZED, FOR SOLUTION INTRAVENOUS at 05:02

## 2021-10-30 RX ADMIN — HYDROCORTISONE 5 MG: 10 TABLET ORAL at 10:35

## 2021-10-30 RX ADMIN — HYDROCORTISONE 15 MG: 10 TABLET ORAL at 08:20

## 2021-10-30 RX ADMIN — AMOXICILLIN AND CLAVULANATE POTASSIUM 875 MG: 400; 57 POWDER, FOR SUSPENSION ORAL at 10:36

## 2021-10-30 RX ADMIN — Medication 40 MG: at 08:56

## 2021-10-30 RX ADMIN — Medication 15 ML: at 10:36

## 2021-10-30 RX ADMIN — METOCLOPRAMIDE HYDROCHLORIDE 10 MG: 5 SOLUTION ORAL at 10:37

## 2021-10-30 RX ADMIN — OXYCODONE HYDROCHLORIDE AND ACETAMINOPHEN 1000 MG: 500 TABLET ORAL at 08:20

## 2021-10-30 RX ADMIN — CALCIUM CARBONATE 1250 MG: 1250 SUSPENSION ORAL at 10:36

## 2021-10-30 RX ADMIN — LEVOTHYROXINE SODIUM 150 MCG: 75 TABLET ORAL at 08:18

## 2021-10-30 RX ADMIN — ACETYLCYSTEINE 2 ML: 200 SOLUTION ORAL; RESPIRATORY (INHALATION) at 08:16

## 2021-10-30 RX ADMIN — METOCLOPRAMIDE HYDROCHLORIDE 10 MG: 5 SOLUTION ORAL at 08:20

## 2021-10-30 RX ADMIN — ALBUTEROL SULFATE 2.5 MG: 2.5 SOLUTION RESPIRATORY (INHALATION) at 11:52

## 2021-10-30 ASSESSMENT — ACTIVITIES OF DAILY LIVING (ADL)
ADLS_ACUITY_SCORE: 36
ADLS_ACUITY_SCORE: 40
ADLS_ACUITY_SCORE: 36
ADLS_ACUITY_SCORE: 40
ADLS_ACUITY_SCORE: 36

## 2021-10-30 ASSESSMENT — MIFFLIN-ST. JEOR: SCORE: 1597.06
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
## 2021-10-30 NOTE — PLAN OF CARE
Summary:    DATE & TIME: 10/29/21 9170-2841  Cognitive Concerns/ Orientation : alert, baseline moaning with staff interaction  BEHAVIOR & AGGRESSION TOOL COLOR: Green  CIWA SCORE: NA  ABNL VS/O2: VSS, RA, afebrile    MOBILITY: Total lift, turn and reposition Q2hr  PAIN MANAGMENT: no signs of pain  DIET: Tube feeding running at 55 ml/hr, NPO   BOWEL/BLADDER: incont B&B, one loose yellow/tan BM  ABNL LAB/BG: na  DRAIN/DEVICES: PIV SL L. upper arm  TELEMETRY RHYTHM: na  SKIN: G tube site is red-cleaned and drainage 2x2 applied to the site  TESTS/PROCEDURES: none  D/C DAY/GOALS/PLACE:Today 10/30/21 at 12pm to home   OTHER IMPORTANT INFO: receiving IV abx. Congested cough. Oral cares provided. Diminished lung sounds.

## 2021-10-30 NOTE — PROGRESS NOTES
Discharge    Patient discharged to home  via stretcher with [facility] trnsportation  Care plan note: verbally reveiwed discharge plan, medications and post care with mother who is legal guardian. Sent home medications, AVS with patient.    Listed belongings gathered and given to patient (including from security/pharmacy). Yes  Care Plan and Patient education resolved: Yes  Prescriptions if needed, hard copies sent with patient  NA  Medication Bin checked and emptied on discharge Yes  SW/care coordinator/charge RN aware of discharge: Yes

## 2021-10-30 NOTE — PLAN OF CARE
Cognitive Concerns/ Orientation : alert, baseline moaning with staff interaction  BEHAVIOR & AGGRESSION TOOL COLOR: green  CIWA SCORE: na   ABNL VS/O2: VSS, afebrile    MOBILITY: Total lift, turn and reposition Q2hr  PAIN MANAGMENT: no signs of pain  DIET: Tube feeding running at 55 ml/hr, NPO, 160 mL flush q4   BOWEL/BLADDER: incont B&B, one loose yellow/brown BM  ABNL LAB/BG: na  DRAIN/DEVICES: PIV L upper arm,   TELEMETRY RHYTHM: na  SKIN: G tube site CDI  TESTS/PROCEDURES: none  D/C DAY/GOALS/PLACE: tomorrow 10/30/21 at 12pm to home   OTHER IMPORTANT INFO: receiving IV abx. Congested cough. Oral cares provided. Diminished lung sounds.

## 2021-11-01 ENCOUNTER — PATIENT OUTREACH (OUTPATIENT)
Dept: CARE COORDINATION | Facility: CLINIC | Age: 61
End: 2021-11-01

## 2021-11-01 DIAGNOSIS — Z71.89 OTHER SPECIFIED COUNSELING: ICD-10-CM

## 2021-11-01 NOTE — PROGRESS NOTES
"Clinic Care Coordination Contact  [facility]: Post-Discharge Note  SITUATION                                                      Admission:    Admission Date: 11/28/21   Reason for Admission: Aspiration pneumonia versus pneumonitis  Discharge:   Discharge Date: 11/30/21  Discharge Diagnosis: Aspiration pneumonia versus pneumonitis    BACKGROUND                                                      Keyon Farias is a 61 year old male with history of TBI with right-sided spastic hemiplegia, severe dysphagia, status post placement, panhypopituitarism, secondary adrenal insufficiency, GERD, seizure disorder and multiple hospitalizations for aspiration pneumonia/pneumonitis who presented to the ED with reported fevers and productive cough at home.  Patient is unable to provide history given baseline cognitive capability secondary to TBI decades ago.  Discussion with his mother who is his primary caregiver, he has been having junky cough and temperature up to 101 in the past 2 to 3 days.  No other new issues have been going on-she denies any new pain, nausea, vomiting, diarrhea, lower extremity edema, urinary issues, or changes in medications.  Patient is able to answer some yes/no questions and confirm the aforementioned.  Patient has received azithromycin and ceftriaxone for assumed immunity acquired pneumonia in the left lower lobe and will be admitted for further cares.    ASSESSMENT           Discharge Assessment  How are you doing now that you are home?: \"Keyon is doing fine, his medication is making his stools runny but other than that he's ok\"  How are your symptoms? (Red Flag symptoms escalate to triage hotline per guidelines): Improved  Do you feel your condition is stable enough to be safe at home until your provider visit?: Yes  Does the patient have their discharge instructions? : Yes  Does the patient have questions regarding their discharge instructions? : No  Were you started on any new medications or " were there changes to any of your previous medications? : Yes  Does the patient have all of their medications?: Yes  Do you have questions regarding any of your medications? : No  Do you have all of your needed medical supplies or equipment (DME)?  (i.e. oxygen tank, CPAP, cane, etc.): Yes  Discharge follow-up appointment scheduled within 14 calendar days? : No  Is patient agreeable to assistance with scheduling? : No                  PLAN                                                      Outpatient Plan:   Follow-up and recommended labs and tests       Follow up with primary care provider, Carlos Gomez, as needed.     Future Appointments   Date Time Provider Department Center   1/5/2022  2:00 PM Faizan Mclean MD [facility]         For any urgent concerns, please contact our 24 hour nurse triage line: [phone] ([phone])         Sue Rodriguez  Community Health Worker  [facility]  [phone]

## 2021-11-02 LAB
BACTERIA BLD CULT: NO GROWTH
BACTERIA BLD CULT: NO GROWTH

## 2021-11-12 ENCOUNTER — APPOINTMENT (OUTPATIENT)
Dept: GENERAL RADIOLOGY | Facility: CLINIC | Age: 61
DRG: 871 | End: 2021-11-12
Attending: EMERGENCY MEDICINE
Payer: MEDICARE

## 2021-11-12 ENCOUNTER — HOSPITAL ENCOUNTER (INPATIENT)
Facility: CLINIC | Age: 61
LOS: 4 days | Discharge: HOME HEALTH | DRG: 871 | End: 2021-11-17
Attending: EMERGENCY MEDICINE | Admitting: HOSPITALIST
Payer: MEDICARE

## 2021-11-12 DIAGNOSIS — R65.20 SEVERE SEPSIS (H): ICD-10-CM

## 2021-11-12 DIAGNOSIS — J18.9 PNEUMONIA OF BOTH LUNGS DUE TO INFECTIOUS ORGANISM, UNSPECIFIED PART OF LUNG: ICD-10-CM

## 2021-11-12 DIAGNOSIS — A41.9 SEVERE SEPSIS (H): ICD-10-CM

## 2021-11-12 LAB
ALBUMIN SERPL-MCNC: 3.3 G/DL (ref 3.4–5)
ALBUMIN UR-MCNC: 30 MG/DL
ALP SERPL-CCNC: 107 U/L (ref 40–150)
ALT SERPL W P-5'-P-CCNC: 30 U/L (ref 0–70)
ANION GAP SERPL CALCULATED.3IONS-SCNC: 6 MMOL/L (ref 3–14)
APPEARANCE UR: CLEAR
AST SERPL W P-5'-P-CCNC: 26 U/L (ref 0–45)
BASE EXCESS BLDV CALC-SCNC: 4.6 MMOL/L (ref -7.7–1.9)
BASOPHILS # BLD AUTO: 0.1 10E3/UL (ref 0–0.2)
BASOPHILS NFR BLD AUTO: 1 %
BILIRUB SERPL-MCNC: 0.3 MG/DL (ref 0.2–1.3)
BILIRUB UR QL STRIP: NEGATIVE
BUN SERPL-MCNC: 18 MG/DL (ref 7–30)
CALCIUM SERPL-MCNC: 8.3 MG/DL (ref 8.5–10.1)
CHLORIDE BLD-SCNC: 105 MMOL/L (ref 94–109)
CO2 SERPL-SCNC: 27 MMOL/L (ref 20–32)
COLOR UR AUTO: YELLOW
CREAT SERPL-MCNC: 0.93 MG/DL (ref 0.66–1.25)
EOSINOPHIL # BLD AUTO: 0.5 10E3/UL (ref 0–0.7)
EOSINOPHIL NFR BLD AUTO: 5 %
ERYTHROCYTE [DISTWIDTH] IN BLOOD BY AUTOMATED COUNT: 13.9 % (ref 10–15)
FLUAV RNA SPEC QL NAA+PROBE: NEGATIVE
FLUBV RNA RESP QL NAA+PROBE: NEGATIVE
GFR SERPL CREATININE-BSD FRML MDRD: 90 ML/MIN/1.73M2
GLUCOSE BLD-MCNC: 175 MG/DL (ref 70–99)
GLUCOSE UR STRIP-MCNC: NEGATIVE MG/DL
HCO3 BLDV-SCNC: 28 MMOL/L (ref 21–28)
HCT VFR BLD AUTO: 46.7 % (ref 40–53)
HGB BLD-MCNC: 15.2 G/DL (ref 13.3–17.7)
HGB UR QL STRIP: NEGATIVE
IMM GRANULOCYTES # BLD: 0 10E3/UL
IMM GRANULOCYTES NFR BLD: 0 %
KETONES UR STRIP-MCNC: NEGATIVE MG/DL
LACTATE SERPL-SCNC: 2.4 MMOL/L (ref 0.7–2)
LEUKOCYTE ESTERASE UR QL STRIP: NEGATIVE
LYMPHOCYTES # BLD AUTO: 4.3 10E3/UL (ref 0.8–5.3)
LYMPHOCYTES NFR BLD AUTO: 41 %
MCH RBC QN AUTO: 29.3 PG (ref 26.5–33)
MCHC RBC AUTO-ENTMCNC: 32.5 G/DL (ref 31.5–36.5)
MCV RBC AUTO: 90 FL (ref 78–100)
MONOCYTES # BLD AUTO: 0.9 10E3/UL (ref 0–1.3)
MONOCYTES NFR BLD AUTO: 9 %
NEUTROPHILS # BLD AUTO: 4.5 10E3/UL (ref 1.6–8.3)
NEUTROPHILS NFR BLD AUTO: 44 %
NITRATE UR QL: NEGATIVE
NRBC # BLD AUTO: 0 10E3/UL
NRBC BLD AUTO-RTO: 0 /100
O2/TOTAL GAS SETTING VFR VENT: 0 %
PCO2 BLDV: 38 MM HG (ref 40–50)
PH BLDV: 7.48 [PH] (ref 7.32–7.43)
PH UR STRIP: 8 [PH] (ref 5–7)
PLATELET # BLD AUTO: 205 10E3/UL (ref 150–450)
PO2 BLDV: 70 MM HG (ref 25–47)
POTASSIUM BLD-SCNC: 3.9 MMOL/L (ref 3.4–5.3)
PROT SERPL-MCNC: 7.9 G/DL (ref 6.8–8.8)
RBC # BLD AUTO: 5.18 10E6/UL (ref 4.4–5.9)
RBC URINE: 2 /HPF
SARS-COV-2 RNA RESP QL NAA+PROBE: NEGATIVE
SODIUM SERPL-SCNC: 138 MMOL/L (ref 133–144)
SP GR UR STRIP: 1.03 (ref 1–1.03)
UROBILINOGEN UR STRIP-MCNC: >12 MG/DL
WBC # BLD AUTO: 10.4 10E3/UL (ref 4–11)
WBC URINE: <1 /HPF

## 2021-11-12 PROCEDURE — 83605 ASSAY OF LACTIC ACID: CPT | Performed by: EMERGENCY MEDICINE

## 2021-11-12 PROCEDURE — 36415 COLL VENOUS BLD VENIPUNCTURE: CPT | Performed by: EMERGENCY MEDICINE

## 2021-11-12 PROCEDURE — 99285 EMERGENCY DEPT VISIT HI MDM: CPT

## 2021-11-12 PROCEDURE — 96361 HYDRATE IV INFUSION ADD-ON: CPT

## 2021-11-12 PROCEDURE — C9803 HOPD COVID-19 SPEC COLLECT: HCPCS

## 2021-11-12 PROCEDURE — 87636 SARSCOV2 & INF A&B AMP PRB: CPT | Performed by: EMERGENCY MEDICINE

## 2021-11-12 PROCEDURE — 85025 COMPLETE CBC W/AUTO DIFF WBC: CPT | Performed by: EMERGENCY MEDICINE

## 2021-11-12 PROCEDURE — 87086 URINE CULTURE/COLONY COUNT: CPT | Performed by: EMERGENCY MEDICINE

## 2021-11-12 PROCEDURE — 80053 COMPREHEN METABOLIC PANEL: CPT | Performed by: EMERGENCY MEDICINE

## 2021-11-12 PROCEDURE — 81001 URINALYSIS AUTO W/SCOPE: CPT | Performed by: EMERGENCY MEDICINE

## 2021-11-12 PROCEDURE — 87040 BLOOD CULTURE FOR BACTERIA: CPT | Performed by: EMERGENCY MEDICINE

## 2021-11-12 PROCEDURE — 82803 BLOOD GASES ANY COMBINATION: CPT | Performed by: EMERGENCY MEDICINE

## 2021-11-12 PROCEDURE — 71045 X-RAY EXAM CHEST 1 VIEW: CPT

## 2021-11-12 PROCEDURE — 258N000003 HC RX IP 258 OP 636: Performed by: EMERGENCY MEDICINE

## 2021-11-12 PROCEDURE — 93005 ELECTROCARDIOGRAM TRACING: CPT

## 2021-11-12 RX ADMIN — SODIUM CHLORIDE 1000 ML: 9 INJECTION, SOLUTION INTRAVENOUS at 22:40

## 2021-11-13 LAB
ANION GAP SERPL CALCULATED.3IONS-SCNC: 10 MMOL/L (ref 3–14)
ATRIAL RATE - MUSE: 115 BPM
BASOPHILS # BLD AUTO: 0.1 10E3/UL (ref 0–0.2)
BASOPHILS NFR BLD AUTO: 1 %
BUN SERPL-MCNC: 15 MG/DL (ref 7–30)
CALCIUM SERPL-MCNC: 7.3 MG/DL (ref 8.5–10.1)
CHLORIDE BLD-SCNC: 109 MMOL/L (ref 94–109)
CO2 SERPL-SCNC: 25 MMOL/L (ref 20–32)
CREAT SERPL-MCNC: 0.96 MG/DL (ref 0.66–1.25)
DIASTOLIC BLOOD PRESSURE - MUSE: NORMAL MMHG
EOSINOPHIL # BLD AUTO: 0.6 10E3/UL (ref 0–0.7)
EOSINOPHIL NFR BLD AUTO: 9 %
ERYTHROCYTE [DISTWIDTH] IN BLOOD BY AUTOMATED COUNT: 13.8 % (ref 10–15)
GFR SERPL CREATININE-BSD FRML MDRD: 86 ML/MIN/1.73M2
GLUCOSE BLD-MCNC: 126 MG/DL (ref 70–99)
HCT VFR BLD AUTO: 39.7 % (ref 40–53)
HGB BLD-MCNC: 12.4 G/DL (ref 13.3–17.7)
IMM GRANULOCYTES # BLD: 0 10E3/UL
IMM GRANULOCYTES NFR BLD: 0 %
INTERPRETATION ECG - MUSE: NORMAL
LACTATE SERPL-SCNC: 2.1 MMOL/L (ref 0.7–2)
LACTATE SERPL-SCNC: 2.9 MMOL/L (ref 0.7–2)
LYMPHOCYTES # BLD AUTO: 2.7 10E3/UL (ref 0.8–5.3)
LYMPHOCYTES NFR BLD AUTO: 46 %
MCH RBC QN AUTO: 29.3 PG (ref 26.5–33)
MCHC RBC AUTO-ENTMCNC: 31.2 G/DL (ref 31.5–36.5)
MCV RBC AUTO: 94 FL (ref 78–100)
MONOCYTES # BLD AUTO: 0.5 10E3/UL (ref 0–1.3)
MONOCYTES NFR BLD AUTO: 8 %
NEUTROPHILS # BLD AUTO: 2.2 10E3/UL (ref 1.6–8.3)
NEUTROPHILS NFR BLD AUTO: 36 %
NRBC # BLD AUTO: 0 10E3/UL
NRBC BLD AUTO-RTO: 0 /100
P AXIS - MUSE: 55 DEGREES
PLATELET # BLD AUTO: 123 10E3/UL (ref 150–450)
POTASSIUM BLD-SCNC: 3.3 MMOL/L (ref 3.4–5.3)
POTASSIUM BLD-SCNC: 4.1 MMOL/L (ref 3.4–5.3)
PR INTERVAL - MUSE: 138 MS
PROCALCITONIN SERPL-MCNC: <0.05 NG/ML
QRS DURATION - MUSE: 86 MS
QT - MUSE: 342 MS
QTC - MUSE: 473 MS
R AXIS - MUSE: -62 DEGREES
RBC # BLD AUTO: 4.23 10E6/UL (ref 4.4–5.9)
SODIUM SERPL-SCNC: 144 MMOL/L (ref 133–144)
SYSTOLIC BLOOD PRESSURE - MUSE: NORMAL MMHG
T AXIS - MUSE: 60 DEGREES
VENTRICULAR RATE- MUSE: 115 BPM
WBC # BLD AUTO: 6 10E3/UL (ref 4–11)

## 2021-11-13 PROCEDURE — 36415 COLL VENOUS BLD VENIPUNCTURE: CPT | Performed by: EMERGENCY MEDICINE

## 2021-11-13 PROCEDURE — 250N000013 HC RX MED GY IP 250 OP 250 PS 637: Performed by: EMERGENCY MEDICINE

## 2021-11-13 PROCEDURE — 99207 PR NO CHARGE LOS: CPT | Performed by: INTERNAL MEDICINE

## 2021-11-13 PROCEDURE — 83605 ASSAY OF LACTIC ACID: CPT | Performed by: HOSPITALIST

## 2021-11-13 PROCEDURE — 80048 BASIC METABOLIC PNL TOTAL CA: CPT | Performed by: HOSPITALIST

## 2021-11-13 PROCEDURE — 84145 PROCALCITONIN (PCT): CPT | Performed by: HOSPITALIST

## 2021-11-13 PROCEDURE — 250N000013 HC RX MED GY IP 250 OP 250 PS 637: Performed by: HOSPITALIST

## 2021-11-13 PROCEDURE — 250N000011 HC RX IP 250 OP 636: Performed by: HOSPITALIST

## 2021-11-13 PROCEDURE — 250N000009 HC RX 250: Performed by: HOSPITALIST

## 2021-11-13 PROCEDURE — 250N000009 HC RX 250: Performed by: INTERNAL MEDICINE

## 2021-11-13 PROCEDURE — 99223 1ST HOSP IP/OBS HIGH 75: CPT | Mod: AI | Performed by: HOSPITALIST

## 2021-11-13 PROCEDURE — 36415 COLL VENOUS BLD VENIPUNCTURE: CPT | Performed by: INTERNAL MEDICINE

## 2021-11-13 PROCEDURE — 84132 ASSAY OF SERUM POTASSIUM: CPT | Performed by: INTERNAL MEDICINE

## 2021-11-13 PROCEDURE — 258N000003 HC RX IP 258 OP 636: Performed by: INTERNAL MEDICINE

## 2021-11-13 PROCEDURE — 85025 COMPLETE CBC W/AUTO DIFF WBC: CPT | Performed by: HOSPITALIST

## 2021-11-13 PROCEDURE — 258N000003 HC RX IP 258 OP 636: Performed by: HOSPITALIST

## 2021-11-13 PROCEDURE — 36415 COLL VENOUS BLD VENIPUNCTURE: CPT | Performed by: HOSPITALIST

## 2021-11-13 PROCEDURE — 999N000157 HC STATISTIC RCP TIME EA 10 MIN

## 2021-11-13 PROCEDURE — 120N000001 HC R&B MED SURG/OB

## 2021-11-13 PROCEDURE — 250N000013 HC RX MED GY IP 250 OP 250 PS 637: Performed by: INTERNAL MEDICINE

## 2021-11-13 PROCEDURE — 96374 THER/PROPH/DIAG INJ IV PUSH: CPT

## 2021-11-13 PROCEDURE — 250N000011 HC RX IP 250 OP 636: Performed by: EMERGENCY MEDICINE

## 2021-11-13 PROCEDURE — 258N000003 HC RX IP 258 OP 636: Performed by: EMERGENCY MEDICINE

## 2021-11-13 PROCEDURE — 94640 AIRWAY INHALATION TREATMENT: CPT

## 2021-11-13 PROCEDURE — 83605 ASSAY OF LACTIC ACID: CPT | Performed by: EMERGENCY MEDICINE

## 2021-11-13 PROCEDURE — 94640 AIRWAY INHALATION TREATMENT: CPT | Mod: 76

## 2021-11-13 RX ORDER — CARBAMAZEPINE 100 MG/5ML
150 SUSPENSION ORAL 3 TIMES DAILY
Status: DISCONTINUED | OUTPATIENT
Start: 2021-11-13 | End: 2021-11-17 | Stop reason: HOSPADM

## 2021-11-13 RX ORDER — CALCIUM CARBONATE 1250 MG/5ML
1250 SUSPENSION ORAL 2 TIMES DAILY
Status: DISCONTINUED | OUTPATIENT
Start: 2021-11-13 | End: 2021-11-17 | Stop reason: HOSPADM

## 2021-11-13 RX ORDER — POTASSIUM CHLORIDE 20MEQ/15ML
40 LIQUID (ML) ORAL ONCE
Status: COMPLETED | OUTPATIENT
Start: 2021-11-13 | End: 2021-11-13

## 2021-11-13 RX ORDER — ACETAMINOPHEN 650 MG/1
650 SUPPOSITORY RECTAL EVERY 6 HOURS PRN
Status: DISCONTINUED | OUTPATIENT
Start: 2021-11-13 | End: 2021-11-17 | Stop reason: HOSPADM

## 2021-11-13 RX ORDER — GUAR GUM
1 PACKET (EA) ORAL DAILY
Status: DISCONTINUED | OUTPATIENT
Start: 2021-11-13 | End: 2021-11-17 | Stop reason: HOSPADM

## 2021-11-13 RX ORDER — PIPERACILLIN SODIUM, TAZOBACTAM SODIUM 4; .5 G/20ML; G/20ML
4.5 INJECTION, POWDER, LYOPHILIZED, FOR SOLUTION INTRAVENOUS EVERY 6 HOURS
Status: DISCONTINUED | OUTPATIENT
Start: 2021-11-13 | End: 2021-11-16

## 2021-11-13 RX ORDER — GUAIFENESIN/DEXTROMETHORPHAN 100-10MG/5
10 SYRUP ORAL EVERY 4 HOURS PRN
Status: DISCONTINUED | OUTPATIENT
Start: 2021-11-13 | End: 2021-11-17 | Stop reason: HOSPADM

## 2021-11-13 RX ORDER — ONDANSETRON 4 MG/1
4 TABLET, ORALLY DISINTEGRATING ORAL EVERY 6 HOURS PRN
Status: DISCONTINUED | OUTPATIENT
Start: 2021-11-13 | End: 2021-11-17 | Stop reason: HOSPADM

## 2021-11-13 RX ORDER — PIPERACILLIN SODIUM, TAZOBACTAM SODIUM 4; .5 G/20ML; G/20ML
4.5 INJECTION, POWDER, LYOPHILIZED, FOR SOLUTION INTRAVENOUS ONCE
Status: COMPLETED | OUTPATIENT
Start: 2021-11-13 | End: 2021-11-13

## 2021-11-13 RX ORDER — ACETAMINOPHEN 325 MG/1
650 TABLET ORAL EVERY 6 HOURS PRN
Status: DISCONTINUED | OUTPATIENT
Start: 2021-11-13 | End: 2021-11-17 | Stop reason: HOSPADM

## 2021-11-13 RX ORDER — CARBAMAZEPINE 100 MG/5ML
100 SUSPENSION ORAL DAILY
Status: DISCONTINUED | OUTPATIENT
Start: 2021-11-13 | End: 2021-11-17 | Stop reason: HOSPADM

## 2021-11-13 RX ORDER — ACETYLCYSTEINE 200 MG/ML
2 SOLUTION ORAL; RESPIRATORY (INHALATION) 4 TIMES DAILY
Status: DISCONTINUED | OUTPATIENT
Start: 2021-11-13 | End: 2021-11-17 | Stop reason: HOSPADM

## 2021-11-13 RX ORDER — ALBUTEROL SULFATE 5 MG/ML
2.5 SOLUTION RESPIRATORY (INHALATION)
Status: DISCONTINUED | OUTPATIENT
Start: 2021-11-13 | End: 2021-11-17 | Stop reason: RX

## 2021-11-13 RX ORDER — SODIUM CHLORIDE 9 MG/ML
INJECTION, SOLUTION INTRAVENOUS CONTINUOUS
Status: DISCONTINUED | OUTPATIENT
Start: 2021-11-13 | End: 2021-11-14

## 2021-11-13 RX ORDER — CARBAMAZEPINE 100 MG/5ML
150 SUSPENSION ORAL ONCE
Status: COMPLETED | OUTPATIENT
Start: 2021-11-13 | End: 2021-11-13

## 2021-11-13 RX ORDER — BISACODYL 10 MG
10 SUPPOSITORY, RECTAL RECTAL DAILY PRN
Status: DISCONTINUED | OUTPATIENT
Start: 2021-11-13 | End: 2021-11-17 | Stop reason: HOSPADM

## 2021-11-13 RX ORDER — POLYETHYLENE GLYCOL 3350 17 G/17G
17 POWDER, FOR SOLUTION ORAL DAILY PRN
Status: DISCONTINUED | OUTPATIENT
Start: 2021-11-13 | End: 2021-11-17 | Stop reason: HOSPADM

## 2021-11-13 RX ORDER — IPRATROPIUM BROMIDE AND ALBUTEROL SULFATE 2.5; .5 MG/3ML; MG/3ML
3 SOLUTION RESPIRATORY (INHALATION)
Status: DISCONTINUED | OUTPATIENT
Start: 2021-11-13 | End: 2021-11-13

## 2021-11-13 RX ORDER — SODIUM CHLORIDE 9 MG/ML
INJECTION, SOLUTION INTRAVENOUS CONTINUOUS
Status: DISCONTINUED | OUTPATIENT
Start: 2021-11-13 | End: 2021-11-13

## 2021-11-13 RX ORDER — ALBUTEROL SULFATE 0.83 MG/ML
3 SOLUTION RESPIRATORY (INHALATION)
Status: DISCONTINUED | OUTPATIENT
Start: 2021-11-13 | End: 2021-11-17 | Stop reason: HOSPADM

## 2021-11-13 RX ORDER — ASPIRIN 81 MG/1
81 TABLET, CHEWABLE ORAL DAILY
Status: DISCONTINUED | OUTPATIENT
Start: 2021-11-13 | End: 2021-11-17 | Stop reason: HOSPADM

## 2021-11-13 RX ORDER — ONDANSETRON 2 MG/ML
4 INJECTION INTRAMUSCULAR; INTRAVENOUS EVERY 6 HOURS PRN
Status: DISCONTINUED | OUTPATIENT
Start: 2021-11-13 | End: 2021-11-17 | Stop reason: HOSPADM

## 2021-11-13 RX ORDER — PROCHLORPERAZINE 25 MG
25 SUPPOSITORY, RECTAL RECTAL EVERY 12 HOURS PRN
Status: DISCONTINUED | OUTPATIENT
Start: 2021-11-13 | End: 2021-11-17 | Stop reason: HOSPADM

## 2021-11-13 RX ORDER — METOCLOPRAMIDE HYDROCHLORIDE 5 MG/5ML
10 SOLUTION ORAL
Status: DISCONTINUED | OUTPATIENT
Start: 2021-11-13 | End: 2021-11-17 | Stop reason: HOSPADM

## 2021-11-13 RX ORDER — SODIUM BICARBONATE 650 MG/1
650 TABLET ORAL DAILY
Status: DISCONTINUED | OUTPATIENT
Start: 2021-11-13 | End: 2021-11-17 | Stop reason: HOSPADM

## 2021-11-13 RX ORDER — LIDOCAINE 40 MG/G
CREAM TOPICAL
Status: DISCONTINUED | OUTPATIENT
Start: 2021-11-13 | End: 2021-11-17 | Stop reason: HOSPADM

## 2021-11-13 RX ORDER — LEVOTHYROXINE SODIUM 150 UG/1
150 TABLET ORAL DAILY
Status: DISCONTINUED | OUTPATIENT
Start: 2021-11-13 | End: 2021-11-17 | Stop reason: HOSPADM

## 2021-11-13 RX ORDER — BACITRACIN ZINC 500 [USP'U]/G
OINTMENT TOPICAL DAILY PRN
Status: DISCONTINUED | OUTPATIENT
Start: 2021-11-13 | End: 2021-11-17 | Stop reason: HOSPADM

## 2021-11-13 RX ORDER — DEXTROSE MONOHYDRATE 100 MG/ML
INJECTION, SOLUTION INTRAVENOUS CONTINUOUS PRN
Status: DISCONTINUED | OUTPATIENT
Start: 2021-11-13 | End: 2021-11-17 | Stop reason: HOSPADM

## 2021-11-13 RX ORDER — HYDROCORTISONE 5 MG/1
5 TABLET ORAL DAILY
Status: DISCONTINUED | OUTPATIENT
Start: 2021-11-13 | End: 2021-11-17 | Stop reason: HOSPADM

## 2021-11-13 RX ORDER — PROCHLORPERAZINE MALEATE 10 MG
10 TABLET ORAL EVERY 6 HOURS PRN
Status: DISCONTINUED | OUTPATIENT
Start: 2021-11-13 | End: 2021-11-17 | Stop reason: HOSPADM

## 2021-11-13 RX ADMIN — IPRATROPIUM BROMIDE AND ALBUTEROL SULFATE 3 ML: .5; 3 SOLUTION RESPIRATORY (INHALATION) at 10:01

## 2021-11-13 RX ADMIN — PIPERACILLIN SODIUM AND TAZOBACTAM SODIUM 4.5 G: 4; .5 INJECTION, POWDER, LYOPHILIZED, FOR SOLUTION INTRAVENOUS at 01:36

## 2021-11-13 RX ADMIN — PIPERACILLIN SODIUM AND TAZOBACTAM SODIUM 4.5 G: 4; .5 INJECTION, POWDER, LYOPHILIZED, FOR SOLUTION INTRAVENOUS at 06:48

## 2021-11-13 RX ADMIN — CARBAMAZEPINE 100 MG: 100 SUSPENSION ORAL at 17:41

## 2021-11-13 RX ADMIN — ALBUTEROL SULFATE 2.5 MG: 2.5 SOLUTION RESPIRATORY (INHALATION) at 19:10

## 2021-11-13 RX ADMIN — METOCLOPRAMIDE HYDROCHLORIDE 10 MG: 5 SOLUTION ORAL at 19:50

## 2021-11-13 RX ADMIN — POTASSIUM & SODIUM PHOSPHATES POWDER PACK 280-160-250 MG 1 PACKET: 280-160-250 PACK at 16:27

## 2021-11-13 RX ADMIN — CALCIUM CARBONATE 1250 MG: 1250 SUSPENSION ORAL at 20:05

## 2021-11-13 RX ADMIN — ACETAMINOPHEN 1000 MG: 325 SUSPENSION ORAL at 00:03

## 2021-11-13 RX ADMIN — METOCLOPRAMIDE HYDROCHLORIDE 10 MG: 5 SOLUTION ORAL at 13:54

## 2021-11-13 RX ADMIN — SODIUM CHLORIDE: 9 INJECTION, SOLUTION INTRAVENOUS at 05:04

## 2021-11-13 RX ADMIN — CARBAMAZEPINE 150 MG: 100 SUSPENSION ORAL at 13:49

## 2021-11-13 RX ADMIN — ACETYLCYSTEINE 2 ML: 200 SOLUTION ORAL; RESPIRATORY (INHALATION) at 19:09

## 2021-11-13 RX ADMIN — POTASSIUM & SODIUM PHOSPHATES POWDER PACK 280-160-250 MG 1 PACKET: 280-160-250 PACK at 20:04

## 2021-11-13 RX ADMIN — ASPIRIN 81 MG CHEWABLE TABLET 81 MG: 81 TABLET CHEWABLE at 13:47

## 2021-11-13 RX ADMIN — METOCLOPRAMIDE HYDROCHLORIDE 10 MG: 5 SOLUTION ORAL at 16:27

## 2021-11-13 RX ADMIN — POTASSIUM CHLORIDE 40 MEQ: 20 SOLUTION ORAL at 13:55

## 2021-11-13 RX ADMIN — Medication 40 MG: at 13:51

## 2021-11-13 RX ADMIN — CARBAMAZEPINE 150 MG: 100 SUSPENSION ORAL at 22:06

## 2021-11-13 RX ADMIN — LEVOTHYROXINE SODIUM 150 MCG: 150 TABLET ORAL at 17:41

## 2021-11-13 RX ADMIN — CARBAMAZEPINE 150 MG: 100 SUSPENSION ORAL at 03:01

## 2021-11-13 RX ADMIN — HYDROCORTISONE 5 MG: 5 TABLET ORAL at 13:47

## 2021-11-13 RX ADMIN — PIPERACILLIN SODIUM AND TAZOBACTAM SODIUM 4.5 G: 4; .5 INJECTION, POWDER, LYOPHILIZED, FOR SOLUTION INTRAVENOUS at 19:47

## 2021-11-13 RX ADMIN — ALBUTEROL SULFATE 2.5 MG: 2.5 SOLUTION RESPIRATORY (INHALATION) at 19:09

## 2021-11-13 RX ADMIN — Medication 1 PACKET: at 10:53

## 2021-11-13 RX ADMIN — BRIVARACETAM 100 MG: 10 SOLUTION ORAL at 13:28

## 2021-11-13 RX ADMIN — CALCIUM CARBONATE 1250 MG: 1250 SUSPENSION ORAL at 13:49

## 2021-11-13 RX ADMIN — SODIUM BICARBONATE 650 MG TABLET 650 MG: at 13:47

## 2021-11-13 RX ADMIN — SODIUM CHLORIDE, POTASSIUM CHLORIDE, SODIUM LACTATE AND CALCIUM CHLORIDE 1000 ML: 600; 310; 30; 20 INJECTION, SOLUTION INTRAVENOUS at 02:50

## 2021-11-13 RX ADMIN — SODIUM CHLORIDE: 9 INJECTION, SOLUTION INTRAVENOUS at 17:37

## 2021-11-13 RX ADMIN — PIPERACILLIN SODIUM AND TAZOBACTAM SODIUM 4.5 G: 4; .5 INJECTION, POWDER, LYOPHILIZED, FOR SOLUTION INTRAVENOUS at 12:52

## 2021-11-13 RX ADMIN — BRIVARACETAM 100 MG: 10 SOLUTION ORAL at 22:05

## 2021-11-13 ASSESSMENT — ACTIVITIES OF DAILY LIVING (ADL)
ADLS_ACUITY_SCORE: 28
ADLS_ACUITY_SCORE: 40
ADLS_ACUITY_SCORE: 28
ADLS_ACUITY_SCORE: 40
ADLS_ACUITY_SCORE: 28
ADLS_ACUITY_SCORE: 40
ADLS_ACUITY_SCORE: 40
ADLS_ACUITY_SCORE: 14
ADLS_ACUITY_SCORE: 14
ADLS_ACUITY_SCORE: 28
ADLS_ACUITY_SCORE: 32
ADLS_ACUITY_SCORE: 14
ADLS_ACUITY_SCORE: 40
ADLS_ACUITY_SCORE: 22
ADLS_ACUITY_SCORE: 28

## 2021-11-13 NOTE — PLAN OF CARE
DATE & TIME: 11/13/2021 days     Cognitive Concerns/ Orientation :  pt is non verbal can do thumbs up and thumbs down    BEHAVIOR & AGGRESSION TOOL COLOR:  green   CIWA SCORE: na    ABNL VS/O2:  VSS RA   MOBILITY:  total care and turn every 2 hours   PAIN MANAGMENT:  appears comfortable   DIET:  Tube feeding was running and now clamped due to large emesis   BOWEL/BLADDER:  incontinent of bladder and bowel   ABNL LAB/BG:  K was 3.3 replaced Lactic 2.1 MD aware   DRAIN/DEVICES:  Tube feeding and IV infusing   TELEMETRY RHYTHM:  na   SKIN:  bruised   TESTS/PROCEDURES:  none   D/C DATE:  uncertain   Discharge Barriers:     Pt was doing well, and tube feeding restarted and then pt had a large emesis and MD called and tube feeing on hold. Mother states his gastric port should be hooked up to a drainage bag. Attempting to find a bag to use for drainage. Pt needs to be turned and is incontinent of urine and stool. Remains on RA.

## 2021-11-13 NOTE — PROGRESS NOTES
RECEIVING UNIT ED HANDOFF REVIEW    ED Nurse Handoff Report was reviewed by: Sharon Muir RN on November 13, 2021 at 1:33 AM

## 2021-11-13 NOTE — ED NOTES
[facility]  ED Nurse Handoff Report    ED Chief complaint: Fever      ED Diagnosis:   Final diagnoses:   None       Code Status: See prior    Allergies:   Allergies   Allergen Reactions     Valproic Acid Other (See Comments)     Toxicity w/ bone marrow suspension, elevated ammonia levels      Dilantin [Phenytoin Sodium] Other (See Comments)     Severe Trembling     Scopolamine Hives     Hives with the patch - oral no problem       Patient Story: pt here for frequent UTI and pneumonia. Mother sent pt in because he had a fever of 101 at home. Upon arrival pt has cough and fever.  Focused Assessment:  Pt alert. Coughing thick sputum. Fever 102 rectal. Tachycardic. Lac of 2.5.     Treatments and/or interventions provided: Tylenol  Patient's response to treatments and/or interventions: needs reassessment    To be done/followed up on inpatient unit:  n/a    Does this patient have any cognitive concerns?: non verbal at baseline    Activity level - Baseline/Home:  Total Care  Activity Level - Current:   Total Care and Unknown    Patient's Preferred language: English   Needed?: No    Isolation: None and COVID r/o and special precautions  Infection: Not Applicable  Patient tested for COVID 19 prior to admission: YES  Bariatric?: No    Vital Signs:   Vitals:    11/12/21 2223 11/12/21 2247   BP: 114/70    Pulse: 114    Resp: 16    Temp: 98.7  F (37.1  C) (!) 102  F (38.9  C)   TempSrc: Temporal Rectal   SpO2: 91%        Cardiac Rhythm:     Was the PSS-3 completed:   Yes  What interventions are required if any?               Family Comments: na  OBS brochure/video discussed/provided to patient/family: N/A              Name of person given brochure if not patient: na              Relationship to patient: na    For the majority of the shift this patient's behavior was Green.   Behavioral interventions performed were na.    ED NURSE PHONE NUMBER: [id number]

## 2021-11-13 NOTE — CONSULTS
"CLINICAL NUTRITION SERVICES  -  ASSESSMENT NOTE    RECOMMENDATIONS FOR MD/PROVIDER TO ORDER:   - Pt receives NeutraPhos TID at baseline    Recommendations Ordered by Registered Dietitian (RD):   Type of Feeding Tube: GJ tube (feed into J-port)  Enteral Frequency:  Continuous  Enteral Regimen: Jevity 1.5 @ 55 mL/hr   Total Enteral Provisions: 1320ml/day will provide: 1980 kcals (26 kcal/kg), 84 g PRO (1.1 g/kg), 1003 ml free H20, 284 g CHO, and 27 g fiber daily.  Add 1 pkt Nutrisource Fiber daily (15 kcal, 4 g fiber)     Free Water Flush: 160 mL every 4 hrs   Begin @ 35 ml/hr. Advance to 55 ml/hr after 4 hours.   Malnutrition:   % Weight Loss:  None noted  % Intake:  No decreased intake noted  Subcutaneous Fat Loss:  Deferred - not suspected   Muscle Loss: Deferred - not suspected   Fluid Retention:  None noted    Malnutrition Diagnosis: Patient does not meet two of the above criteria necessary for diagnosing malnutrition     REASON FOR ASSESSMENT  Keyon Farias is a 61 year old male seen by Registered Dietitian for Provider Order - Registered Dietitian to Assess and Order TF per Medical Nutrition Therapy Protocol    NUTRITION HISTORY  - Information obtained from chart. Patient is well known to this service. Last admitted on 10/28    He lives at home with his mother Savannah who acts as his caregiver.   - He relies solely on enteral nutrition to meet his nutrient and fluid needs.   - Enteral regimen as follows:      Jevity 1.5 @ 55 ml/hr x 24 hours  Total 3740-5927 mL free water   1 Scoop Benefiber daily     CURRENT NUTRITION ORDERS  Diet Order:     NPO     Current Intake/Tolerance:  NA    NUTRITION FOCUSED PHYSICAL ASSESSMENT FOR DIAGNOSING MALNUTRITION)  No:  Deferred    Obtained from Chart/Interdisciplinary Team:  None noted    ANTHROPOMETRICS  Height: 5' 11.5\"  Weight: 167 lbs 4.8 oz (75.9 kg)  Body mass index is 23.01 kg/m .  Weight Status:  Normal BMI  Weight History: Wts are consistent.  Wt Readings from Last 10 " Encounters:   11/13/21 75.9 kg (167 lb 4.8 oz)   10/30/21 75.2 kg (165 lb 12.6 oz)   08/10/21 78.8 kg (173 lb 11.6 oz)   05/24/21 80.1 kg (176 lb 8 oz)   04/16/21 76 kg (167 lb 8.8 oz)   02/22/21 74.8 kg (165 lb)   01/18/21 74.2 kg (163 lb 9.3 oz)   12/20/20 77.2 kg (170 lb 1.6 oz)   11/04/20 71 kg (156 lb 8.4 oz)   10/25/20 68.9 kg (151 lb 12.8 oz)       LABS  Labs reviewed  Recent Labs   Lab 11/12/21  2241   *       MEDICATIONS  Medications reviewed  NaCl IVF @ 100 mL/hr    ASSESSED NUTRITION NEEDS PER APPROVED PRACTICE GUIDELINES:  Dosing Weight 76 kg   Estimated Energy Needs: 9429-0668 kcals (25-30 Kcal/Kg)  Justification: maintenance  Estimated Protein Needs:  grams protein (1.2-1.5 g pro/Kg)  Justification: preservation of lean body mass  Estimated Fluid Needs: 1 mL/kcal  Justification: maintenance    MALNUTRITION:  % Weight Loss:  None noted  % Intake:  No decreased intake noted  Subcutaneous Fat Loss:  Deferred - not suspected   Muscle Loss: Deferred - not suspected   Fluid Retention:  None noted    Malnutrition Diagnosis: Patient does not meet two of the above criteria necessary for diagnosing malnutrition    NUTRITION DIAGNOSIS:  No nutrition diagnosis identified at this time     NUTRITION INTERVENTIONS  Recommendations / Nutrition Prescription  Type of Feeding Tube: GJ tube (feed into J-port)  Enteral Frequency:  Continuous  Enteral Regimen: Jevity 1.5 @ 55 mL/hr   Total Enteral Provisions: 1320ml/day will provide: 1980 kcals (26 kcal/kg), 84 g PRO (1.1 g/kg), 1003 ml free H20, 284 g CHO, and 27 g fiber daily.  Add 1 pkt Nutrisource Fiber daily (15 kcal, 4 g fiber)     Free Water Flush: 160 mL every 4 hrs   Begin @ 35 ml/hr. Advance to 55 ml/hr after 4 hours.    Implementation  Nutrition education: Per Provider order if indicated   EN Composition, EN Schedule and Feeding Tube Flush: ordered in epic as above      Nutrition Goals  TF @ goal to meet % estimated needs.       MONITORING AND  EVALUATION:  Progress towards goals will be monitored and evaluated per protocol and Practice Guidelines    Marisel Fernández RD, LD  [facility] [place], [facility], Orth[facility], [facility]  Pager: [phone]  Weekend Pager: [phone]

## 2021-11-13 NOTE — PHARMACY-ADMISSION MEDICATION HISTORY
Pharmacy Medication History  Admission medication history interview status for the 11/12/2021  admission is complete. See EPIC admission navigator for prior to admission medications     Location of Interview: Phone  Medication history sources: Patient's family/friend (Caregiver, Lydia ), Surescripts and Care Everywhere    Significant changes made to the medication list:  Changed:  -Calcium carbonate 1,250 mg tid to bid    Removed:  -Guar gum fiber packet daily  -Hydrocortisone 50 mg IM prn  -Miconazole 2% powder daily prn  -Prochlorperazine 25 mg suppository q12h prn    In the past week, patient estimated taking medication this percent of the time: greater than 90%    Additional medication history information:   Patient has a legal guardian/caregiving who does all his medications. He does not take anything by mouth.     Medication reconciliation completed by provider prior to medication history? No    Time spent in this activity: 30 minutes    Prior to Admission medications    Medication Sig Last Dose Taking? Auth Provider   acetaminophen (TYLENOL 8 HOUR) 650 MG CR tablet Take 650 mg by mouth every 8 hours as needed for mild pain or fever prn at prn Yes Unknown, Entered By History   acetylcysteine (MUCOMYST) 20 % neb solution Take 2 mLs by nebulization 4 times daily With albuterol at 0700, 1100, 1500, and 1900  11/12/2021 at am Yes Unknown, Entered By History   albuterol (PROVENTIL) (5 MG/ML) 0.5% neb solution Take 2.5 mg by nebulization every 4 hours (while awake) 0700 1100 1500 1900 with mucomyst  11/12/2021 at am Yes Unknown, Entered By History   aspirin (ASA) 81 MG chewable tablet 81 mg by Oral or Feeding Tube route daily At 0900 11/12/2021 at am Yes Unknown, Entered By History   bacitracin ointment Apply topically daily as needed for wound care To PEG site.  prn at prn Yes Unknown, Entered By History   Brivaracetam (BRIVIACT) 10 MG/ML solution 100 mg by Oral or Feeding Tube route 2 times daily 0900, 2100  11/12/2021 at 2100 Yes Unknown, Entered By History   calcium carbonate 1250 MG/5ML SUSP suspension Take 1,250 mg by mouth 2 times daily 0900, 2100 11/12/2021 at am Yes Unknown, Entered By History   carBAMazepine (TEGRETOL) 100 MG/5ML suspension Take 100 mg by mouth daily Take at 1800  11/12/2021 at 1800 Yes Unknown, Entered By History   carBAMazepine (TEGRETOL) 100 MG/5ML suspension 150 mg by Oral or Feeding Tube route 3 times daily At 06:00, 12:00, and 24:00 for seizures 11/12/2021 at 1200 Yes Unknown, Entered By History   clotrimazole-betamethasone (LOTRISONE) 1-0.05 % external cream Apply topically 2 times daily as needed  11/12/2021 at am Yes Leticia Santiago MD   hydrocortisone (CORTEF) 5 MG tablet Take 15 mg (3 tablets) in the morning and 5 mg (1 tablet)  at 2:00 PM. During illness patient takes more as a stress dose. Please increase the dose as directed. 11/12/2021 at am Yes Faizan Mclean MD   hydrocortisone 1 % CREA cream Place rectally 2 times daily as needed for other Apply to reddened memo areas as needed 11/12/2021 at am Yes Unknown, Entered By History   levothyroxine (SYNTHROID/LEVOTHROID) 150 MCG tablet Take 1 tablet (150 mcg) by mouth daily 11/12/2021 at 0500 Yes Faizan Mclean MD   metoclopramide (REGLAN) 10 MG/10ML SOLN solution Take 10 mg by mouth 4 times daily (before meals and nightly) 0800, 1200, 1600, 2000  Disconnects bag before administration, then waits 45 mins before reconnecting after giving the medication 11/12/2021 at am Yes Unknown, Entered By History   multivitamin, therapeutic (THERA-VIT) TABS tablet Take 1 tablet by mouth daily 11/12/2021 at am Yes Reported, Patient   mupirocin (BACTROBAN) 2 % external ointment Apply topically 2 times daily as needed  11/12/2021 at am Yes Reported, Patient   pantoprazole (PROTONIX) 2 mg/mL SUSP suspension 20 mLs (40 mg) by Per J Tube route daily 11/12/2021 at am Yes Alvaro Barahona MD   potassium & sodium phosphates (NEUTRA-PHOS)  280-160-250 MG Packet Take 1 packet by mouth 3 times daily  11/12/2021 at am Yes Yanely Liriano MD   Scopolamine HBr POWD Dispense #90. Mix contents with small amount of water for admin via J-tube.  Administer 0.8 mg three times each day.  Patient taking differently: Dispense #90. Mix contents with small amount of water for admin via J-tube.  Administer 0.8 mg three times each day as needed. prn at prn Yes Jennie Bermudez MD   Skin Protectants, Misc. (BALMEX SKIN PROTECTANT) OINT Externally apply topically 2 times daily as needed (irritation) Applay to reddened memo areas twice daily as needed prn at prn Yes Unknown, Entered By History   sodium bicarbonate 650 MG tablet Take 1 tablet (650 mg) by mouth daily 11/12/2021 at am Yes Ricky Torres MD   vitamin C (ASCORBIC ACID) 1000 MG TABS 1,000 mg by Oral or Feeding Tube route daily  11/12/2021 at am Yes Unknown, Entered By History   vitamin D3 (CHOLECALCIFEROL) 2000 units (50 mcg) tablet Take 2,000 Units by mouth daily Crush and feed via j-tube @@ 0900 11/12/2021 at am Yes Unknown, Entered By History   testosterone cypionate (DEPOTESTOSTERONE) 200 MG/ML injection Inject 0.3 mLs (60 mg) into the muscle every 7 days 11/5/2021 at am  Faizan Mclean MD       The information provided in this note is only as accurate as the sources available at the time of update(s)
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
## 2021-11-13 NOTE — ED NOTES
Bed: ED02  Expected date:   Expected time:   Means of arrival:   Comments:  E1  60 m fever/no covid  2210

## 2021-11-13 NOTE — ED PROVIDER NOTES
History   Chief Complaint:  Fever     The history is provided by the EMS personnel. History limited by: patient is nonverbal.      Keyon Farias is a 61 year old male with history of seizures, ventricular fibrillation, tracheostomy (now closed), DVT, thyroid disease, encephalopathy, among others, who presents via EMS with a fever. The patient's mother called EMS due to the patient developing a fever tonight. History is limited secondary to patient being nonverbal.     I spoke with his mom later during ED course.  States past two days changed from coughing phlegm to clear his throat to coughing up green material instead.  Today did not seem his usual self and felt warm.  Max temp today 102 axillary at home.  Was recently on antibiotics after an admission for pneumonia and finished them about 5 days ago.  At baseline he can say a few words with prompting, uses thumbs up/thumbs down to say no.    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    Physical Exam     Patient Vitals for the past 24 hrs:   BP Temp Temp src Pulse Resp SpO2   11/12/21 2247 -- (!) 102  F (38.9  C) Rectal -- -- --   11/12/21 2223 114/70 98.7  F (37.1  C) Temporal 114 16 91 %     Physical  Exam  Eyes:               Sclera white; Pupils are equal and round, moves eyes around  ENT:                External ears and nares normal, mouth open, dry mucous membranes  CV:                  Rate as above with regular rhythm   Resp:               Inspiratory and expiratory airway noise, no wheezing, occasional cough                          RR 30, no retractions  GI:                   Abdomen is soft, non-tender, non-distended, G tube site w/o cellulitis                          No rebound tenderness or peritoneal features  MS:                  Moves all extremities  Skin:                Warm and dry, no cellulitis visualized including feet and back side.  Stage 1 and 2 decubitus R buttock.  Neuro:             No speech.  No movement of extremities.    Emergency Department Course   ECG  ECG obtained at 2238, ECG read at 2311  Sinus tachycardia  Left anterior fascicular block  Abnormal ECG   No significant change as compared to prior, dated 10/28/21.  Rate 115 bpm. IA interval 138 ms. QRS duration 86 ms. QT/QTc 342/473 ms. P-R-T axes 55 -62 60.     Imaging:  XR Chest Port 1 View  IMPRESSION: Persistent patchy infiltrates of both mid and lower lungs suspicious for infectious/inflammatory pneumonitis. This process has improved on the left since 10/28/2021. No significant pleural fluid. No pneumothorax. Normal heart size.  Reading per radiology.    Laboratory:  CBC: WBC 10.4, HGB 15.2,    CMP: Calcium 8.3 (L), Glucose 175 (H), Albumin 3.3 (L), o/w WNL (Creatinine 0.93)   Blood gas venous: pH Venous 7.48 (H), PCO2 Venous 38 (L), PO2 Venous 70 (H), Bicarbonate 28, Oxyhemoglobin Venous 0, Base Excess 4.6 (H)   Lactic acid (result time 2308) 2.4 (H)   Lactic acid (result time 0104) 2.9 (H)   Blood cultures pending x2    UA with microscopic: pH 8.0 (H), Protein Albumin 30 (A), Urobilinogen >12.0 (H), o/w WNL   Urine Culture Aerobic Bacterial: Pending    Symptomatic Influenza A/B & SARS-CoV2 (COVID19) Virus PCR  Multiplex: negative       Emergency Department Course:  Reviewed:  I reviewed nursing notes, vitals, past medical history and Care Everywhere    Assessments:  2225 I obtained history and examined the patient as noted above.   0039 On recheck after fluids he is looking around more and moving his head back and forth.  Moves left hand for thumbs up as his mom talks about him being able to do this.  0104 Repeat lactic found to be elevated. Additional fluids will be given.    Consults:  0045 I spoke to Dr. Garcia of the hospitalist service who accepts the patient for admission.     Interventions:  2240 0.9% NaCl bolus 1000 mL IV  0003 Tylenol 1000 mg G Tube  0136 Zosyn 4.5 g IV   Lactated ringers bolus 1000 mL IV ordered    Disposition:  The patient was admitted to the hospital under the care of Dr. Garcia.     Impression & Plan   CMS Diagnoses:   The patient has signs of Severe Sepsis        If one the following conditions is present, a 30 mL/kg bolus is recommended as part of the 6 hour bundle (IBW can be used for BMI >30, or document refusal/contraindication):      1.   Initial hypotension  defined as 2 bps < 90 or map < 65 in the 6hrs before or 6hrs after time zero.     2.  Lactate >4.     The patient has signs of Severe Sepsis as evidenced by:    1. 2 SIRS criteria, AND  2. Suspected infection, AND   3. Organ dysfunction: Lactic Acidosis with value >2.0    Time severe sepsis diagnosis confirmed: 2323 11/13/21 as this was the time when Lactate resulted, and the level was > 2.0 and Infection identified    3 Hour Severe Sepsis Bundle Completion:    1. Initial Lactic Acid Result:   Recent Labs   Lab Test 11/12/21  2241 10/28/21  1258 09/30/21  1612   LACT 2.4* 1.5 1.6     2. Blood Cultures before Antibiotics: Yes  3. Broad Spectrum Antibiotics Administered:  yes       Anti-infectives (From admission through now)    Start     Dose/Rate Route Frequency Ordered Stop    11/13/21 0040  piperacillin-tazobactam (ZOSYN) 4.5  "g vial to attach to  mL bag        Note to Pharmacy: For [facility], [facility] and [facility]: For Zosyn-naive patients, use the \"Zosyn initial dose + extended infusion\" order panel.    4.5 g  over 30 Minutes Intravenous ONCE 11/13/21 0035            4. Fluid volume administered in ED:  Full 30 mL/kg bolus given (see amount below).    BMI Readings from Last 1 Encounters:   10/30/21 22.80 kg/m      30 mL/kg fluids based on weight: 2,260 mL  30 mL/kg fluids based on IBW (must be >= 60 inches tall): 2,300 mL                Severe Sepsis reassessment:  1. Repeat Lactic Acid Level: 2.9  2. MAP>65 after initial IVF bolus, will continue to monitor fluid status and vital signs    Medical Decision Making:  Keyon Farias is a 61 year old male who presents for evaluation of fever soon after completing antibiotics for aspiration pneumonia. Work up is consistent with severe sepsis. He does not show evidence for recurrent UTI. He has no abdominal tenderness and no skin findings to suggest cellulitis. Chest xray remains persistently abnormal and he will be covered for respiratory pathogens with Zosyn. Influenza and COVID tests are negative. He perked up after receiving a liter of IV fluids. On recheck after fluids he is looking around more and moving his head back and forth. He moves left hand for thumbs up as his mom talks about him being able to do this. Admission was arranged. Lactic acid was repeated and returned higher than initial. Patient will be given additional IV fluids.     Diagnosis:    ICD-10-CM    1. Pneumonia of both lungs due to infectious organism, unspecified part of lung  J18.9    2. Severe sepsis (H)  A41.9     R65.20      Scribe Disclosure:  I, Phylicia Saab, am serving as a scribe at 10:23 PM on 11/12/2021 to document services personally performed by Jennie Arguelles MD based on my observations and the provider's statements to me.     Jennie Arguelles MD  11/13/21 0223    "

## 2021-11-13 NOTE — PLAN OF CARE
DATE & TIME: 11/13/21, 2561 - 5218   Cognitive Concerns/ Orientation : HECTOR. Patient is nonverbal. Will use thumbs up/thumbs down to indicate yes/no   BEHAVIOR & AGGRESSION TOOL COLOR: Green   ABNL VS/O2: VSS on room air  MOBILITY: Total. Up assist x 2 with lift. Turned and repositioned   PAIN MANAGMENT: Noverbal indicators of pain absent  DIET: NPO  BOWEL/BLADDER: Incontinent of bowels and bladder  ABNL LAB/BG: Lactic acid 2.9. AM labs pending  DRAIN/DEVICES: PIV infusing at 100 ml/hr  TELEMETRY RHYTHM: NA  SKIN: Blanchable redness to coccyx, groin and memo-area. Barrier cream applied  TESTS/PROCEDURES: NA  D/C DATE: Expected discharge in 2-3 days  OTHER IMPORTANT INFO: Nutrition consulted. Contact precautions maintained
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
## 2021-11-13 NOTE — PROGRESS NOTES
Admission    Patient arrives to room 612 via cart from ED.  Care plan note: Initiated    Inpatient nursing criteria listed below were met:    Did you put disposition on whiteboard and in sticky note: NO  Full skin assessment done (add LDA if skin issue present) :YES  Isolation education started/completed No  Patient allergies verified with patient: No  Fall Risk? (Care plan updated, Education given and documented) Yes  Primary Care Plan initiated: Yes  Home medications documented in belongings flowsheet: NA  Patient belongings documented in belongings flowsheet: Yes  Reminder note (belongings/ medications) placed in discharge instructions:NA  Admission profile/ required documentation complete: No  If patient is a 72 hour hold/Commitment are belongings removed from room and locked up? NA

## 2021-11-14 LAB
ANION GAP SERPL CALCULATED.3IONS-SCNC: 4 MMOL/L (ref 3–14)
BUN SERPL-MCNC: 9 MG/DL (ref 7–30)
CALCIUM SERPL-MCNC: 7.9 MG/DL (ref 8.5–10.1)
CHLORIDE BLD-SCNC: 107 MMOL/L (ref 94–109)
CO2 SERPL-SCNC: 28 MMOL/L (ref 20–32)
CREAT SERPL-MCNC: 0.88 MG/DL (ref 0.66–1.25)
ERYTHROCYTE [DISTWIDTH] IN BLOOD BY AUTOMATED COUNT: 13.6 % (ref 10–15)
GFR SERPL CREATININE-BSD FRML MDRD: >90 ML/MIN/1.73M2
GLUCOSE BLD-MCNC: 156 MG/DL (ref 70–99)
GLUCOSE BLDC GLUCOMTR-MCNC: 122 MG/DL (ref 70–99)
HCT VFR BLD AUTO: 38.3 % (ref 40–53)
HGB BLD-MCNC: 12.3 G/DL (ref 13.3–17.7)
MCH RBC QN AUTO: 29.9 PG (ref 26.5–33)
MCHC RBC AUTO-ENTMCNC: 32.1 G/DL (ref 31.5–36.5)
MCV RBC AUTO: 93 FL (ref 78–100)
PLATELET # BLD AUTO: 122 10E3/UL (ref 150–450)
POTASSIUM BLD-SCNC: 3.6 MMOL/L (ref 3.4–5.3)
RBC # BLD AUTO: 4.12 10E6/UL (ref 4.4–5.9)
SODIUM SERPL-SCNC: 139 MMOL/L (ref 133–144)
WBC # BLD AUTO: 7 10E3/UL (ref 4–11)

## 2021-11-14 PROCEDURE — 250N000011 HC RX IP 250 OP 636: Performed by: HOSPITALIST

## 2021-11-14 PROCEDURE — 94640 AIRWAY INHALATION TREATMENT: CPT | Mod: 76

## 2021-11-14 PROCEDURE — 999N000157 HC STATISTIC RCP TIME EA 10 MIN

## 2021-11-14 PROCEDURE — 85027 COMPLETE CBC AUTOMATED: CPT | Performed by: INTERNAL MEDICINE

## 2021-11-14 PROCEDURE — 250N000013 HC RX MED GY IP 250 OP 250 PS 637: Performed by: HOSPITALIST

## 2021-11-14 PROCEDURE — 250N000009 HC RX 250: Performed by: INTERNAL MEDICINE

## 2021-11-14 PROCEDURE — 99232 SBSQ HOSP IP/OBS MODERATE 35: CPT | Performed by: INTERNAL MEDICINE

## 2021-11-14 PROCEDURE — 94640 AIRWAY INHALATION TREATMENT: CPT

## 2021-11-14 PROCEDURE — 258N000003 HC RX IP 258 OP 636: Performed by: INTERNAL MEDICINE

## 2021-11-14 PROCEDURE — 250N000013 HC RX MED GY IP 250 OP 250 PS 637: Performed by: INTERNAL MEDICINE

## 2021-11-14 PROCEDURE — 36415 COLL VENOUS BLD VENIPUNCTURE: CPT | Performed by: INTERNAL MEDICINE

## 2021-11-14 PROCEDURE — 120N000001 HC R&B MED SURG/OB

## 2021-11-14 PROCEDURE — 80048 BASIC METABOLIC PNL TOTAL CA: CPT | Performed by: INTERNAL MEDICINE

## 2021-11-14 PROCEDURE — 250N000009 HC RX 250: Performed by: HOSPITALIST

## 2021-11-14 RX ADMIN — CARBAMAZEPINE 100 MG: 100 SUSPENSION ORAL at 17:00

## 2021-11-14 RX ADMIN — POTASSIUM & SODIUM PHOSPHATES POWDER PACK 280-160-250 MG 1 PACKET: 280-160-250 PACK at 20:23

## 2021-11-14 RX ADMIN — SODIUM BICARBONATE 650 MG TABLET 650 MG: at 09:47

## 2021-11-14 RX ADMIN — ACETYLCYSTEINE 2 ML: 200 SOLUTION ORAL; RESPIRATORY (INHALATION) at 16:38

## 2021-11-14 RX ADMIN — POTASSIUM & SODIUM PHOSPHATES POWDER PACK 280-160-250 MG 1 PACKET: 280-160-250 PACK at 09:53

## 2021-11-14 RX ADMIN — ACETYLCYSTEINE 2 ML: 200 SOLUTION ORAL; RESPIRATORY (INHALATION) at 20:53

## 2021-11-14 RX ADMIN — METOCLOPRAMIDE HYDROCHLORIDE 10 MG: 5 SOLUTION ORAL at 17:00

## 2021-11-14 RX ADMIN — ALBUTEROL SULFATE 2.5 MG: 2.5 SOLUTION RESPIRATORY (INHALATION) at 16:36

## 2021-11-14 RX ADMIN — METOCLOPRAMIDE HYDROCHLORIDE 10 MG: 5 SOLUTION ORAL at 09:48

## 2021-11-14 RX ADMIN — CALCIUM CARBONATE 1250 MG: 1250 SUSPENSION ORAL at 09:47

## 2021-11-14 RX ADMIN — BRIVARACETAM 100 MG: 10 SOLUTION ORAL at 22:34

## 2021-11-14 RX ADMIN — PIPERACILLIN SODIUM AND TAZOBACTAM SODIUM 4.5 G: 4; .5 INJECTION, POWDER, LYOPHILIZED, FOR SOLUTION INTRAVENOUS at 12:16

## 2021-11-14 RX ADMIN — ALBUTEROL SULFATE 2.5 MG: 2.5 SOLUTION RESPIRATORY (INHALATION) at 20:49

## 2021-11-14 RX ADMIN — HYDROCORTISONE 15 MG: 10 TABLET ORAL at 09:46

## 2021-11-14 RX ADMIN — ACETYLCYSTEINE 2 ML: 200 SOLUTION ORAL; RESPIRATORY (INHALATION) at 07:52

## 2021-11-14 RX ADMIN — POTASSIUM & SODIUM PHOSPHATES POWDER PACK 280-160-250 MG 1 PACKET: 280-160-250 PACK at 17:00

## 2021-11-14 RX ADMIN — SODIUM CHLORIDE: 9 INJECTION, SOLUTION INTRAVENOUS at 05:07

## 2021-11-14 RX ADMIN — ALBUTEROL SULFATE 2.5 MG: 2.5 SOLUTION RESPIRATORY (INHALATION) at 11:43

## 2021-11-14 RX ADMIN — METOCLOPRAMIDE HYDROCHLORIDE 10 MG: 5 SOLUTION ORAL at 20:23

## 2021-11-14 RX ADMIN — PIPERACILLIN SODIUM AND TAZOBACTAM SODIUM 4.5 G: 4; .5 INJECTION, POWDER, LYOPHILIZED, FOR SOLUTION INTRAVENOUS at 06:57

## 2021-11-14 RX ADMIN — HYDROCORTISONE 5 MG: 5 TABLET ORAL at 14:10

## 2021-11-14 RX ADMIN — CARBAMAZEPINE 150 MG: 100 SUSPENSION ORAL at 12:51

## 2021-11-14 RX ADMIN — Medication 40 MG: at 09:47

## 2021-11-14 RX ADMIN — METOCLOPRAMIDE HYDROCHLORIDE 10 MG: 5 SOLUTION ORAL at 12:15

## 2021-11-14 RX ADMIN — ALBUTEROL SULFATE 2.5 MG: 2.5 SOLUTION RESPIRATORY (INHALATION) at 07:52

## 2021-11-14 RX ADMIN — PIPERACILLIN SODIUM AND TAZOBACTAM SODIUM 4.5 G: 4; .5 INJECTION, POWDER, LYOPHILIZED, FOR SOLUTION INTRAVENOUS at 01:04

## 2021-11-14 RX ADMIN — BRIVARACETAM 100 MG: 10 SOLUTION ORAL at 11:02

## 2021-11-14 RX ADMIN — LEVOTHYROXINE SODIUM 150 MCG: 150 TABLET ORAL at 17:00

## 2021-11-14 RX ADMIN — CALCIUM CARBONATE 1250 MG: 1250 SUSPENSION ORAL at 20:23

## 2021-11-14 RX ADMIN — PIPERACILLIN SODIUM AND TAZOBACTAM SODIUM 4.5 G: 4; .5 INJECTION, POWDER, LYOPHILIZED, FOR SOLUTION INTRAVENOUS at 18:46

## 2021-11-14 RX ADMIN — Medication 1 PACKET: at 11:03

## 2021-11-14 RX ADMIN — ACETYLCYSTEINE 2 ML: 200 SOLUTION ORAL; RESPIRATORY (INHALATION) at 11:44

## 2021-11-14 RX ADMIN — ALBUTEROL SULFATE 2.5 MG: 2.5 SOLUTION RESPIRATORY (INHALATION) at 16:39

## 2021-11-14 RX ADMIN — ASPIRIN 81 MG CHEWABLE TABLET 81 MG: 81 TABLET CHEWABLE at 09:47

## 2021-11-14 RX ADMIN — CARBAMAZEPINE 150 MG: 100 SUSPENSION ORAL at 05:08

## 2021-11-14 RX ADMIN — CARBAMAZEPINE 150 MG: 100 SUSPENSION ORAL at 22:34

## 2021-11-14 ASSESSMENT — ACTIVITIES OF DAILY LIVING (ADL)
ADLS_ACUITY_SCORE: 40

## 2021-11-14 NOTE — PLAN OF CARE
DATE & TIME: 11/13/21 ([id number])  Cognitive Concerns/ Orientation : Patient is non-verbal. Can make hand signals; will use thumbs up/thumbs down to indicate yes/no   BEHAVIOR & AGGRESSION TOOL COLOR: Green     ABNL VS/O2: VSS on RA  MOBILITY: Total. Assist x2, lift. T/R  PAIN MANAGMENT: Denies pain  DIET: NPO, tube feeding at goal rate of 55 mL/hr  BOWEL/BLADDER: Incontinent of bowels and bladder, 2 small and 1 large BM this shift.  ABNL LAB/BG: Lactic acid 2.1 MD aware. K rechecked (4.1) at 2000 after K replacement.  DRAIN/DEVICES: R hand PIV infusing NS @ 100 mL/hr  TELEMETRY RHYTHM: NA  SKIN: Tender in sacrum/coccyx area, mepilex C/D/I, changed this shift.  TESTS/PROCEDURES: NA  D/C DATE: Expected discharge in 2-3 days  OTHER IMPORTANT INFO: Contact precautions maintained MRSA, VRE. Receiving IV Zosyn q6h.

## 2021-11-14 NOTE — PLAN OF CARE
DATE & TIME: 11/13/21 3 p to 7 p  Cognitive Concerns/ Orientation : Can make hand signals, Patient is nonverbal. Will use thumbs up/thumbs down to indicate yes/no   BEHAVIOR & AGGRESSION TOOL COLOR: Green     ABNL VS/O2: VSS on room air  MOBILITY: Total. Up assist x 2 with lift. Turned and repositioned   PAIN MANAGMENT: Denies pain  DIET: NPO, restarted tube feeding at 6 pm at 35 ml per hour, goal at 55 ml  BOWEL/BLADDER: Incontinent of bowels and bladder, 2 watery BM's this shift  ABNL LAB/BG: Lactic acid 2.1 MD aware  K was 3.3 given potassium and recheck at 1800  DRAIN/DEVICES: PIV infusing at 100 ml/hr  TELEMETRY RHYTHM: NA  SKIN: Tender in sacrum/coccyx area, mepilex applied  TESTS/PROCEDURES: NA  D/C DATE: Expected discharge in 2-3 days  OTHER IMPORTANT INFO: Contact precautions MRSA, VRE

## 2021-11-14 NOTE — PLAN OF CARE
DATE & TIME: 11/14/21 ([id number])  Cognitive Concerns/ Orientation : Patient is non-verbal. Can make hand signals; will use thumbs up/thumbs down to indicate yes/no   BEHAVIOR & AGGRESSION TOOL COLOR: Green     ABNL VS/O2: VSS on RA  MOBILITY: Total. Assist x2, lift. T/R  PAIN MANAGMENT: Denies pain  DIET: NPO, tube feeding at goal rate of 55 mL/hr, water flushes 160 every 4 hours  BOWEL/BLADDER: Incontinent of bowels and bladder  ABNL LAB/BG: Lactic acid 2.1  on admission. Potassium 3.6, WBC 7.0  DRAIN/DEVICES: R hand PIV SL, Iv Zosyn   TELEMETRY RHYTHM: NA  SKIN: Tender in sacrum/coccyx area, no open areas, turn and repositioning every 2 hours  TESTS/PROCEDURES: NA  D/C DATE: Expected discharge pending improvement   OTHER IMPORTANT INFO: Contact precautions maintained MRSA, VRE. Receiving IV Zosyn q6h.
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
## 2021-11-15 LAB
ANION GAP SERPL CALCULATED.3IONS-SCNC: 8 MMOL/L (ref 3–14)
BACTERIA UR CULT: NO GROWTH
BUN SERPL-MCNC: 7 MG/DL (ref 7–30)
CALCIUM SERPL-MCNC: 8.3 MG/DL (ref 8.5–10.1)
CHLORIDE BLD-SCNC: 105 MMOL/L (ref 94–109)
CO2 SERPL-SCNC: 27 MMOL/L (ref 20–32)
CREAT SERPL-MCNC: 0.89 MG/DL (ref 0.66–1.25)
ERYTHROCYTE [DISTWIDTH] IN BLOOD BY AUTOMATED COUNT: 13.8 % (ref 10–15)
GFR SERPL CREATININE-BSD FRML MDRD: >90 ML/MIN/1.73M2
GLUCOSE BLD-MCNC: 116 MG/DL (ref 70–99)
HCT VFR BLD AUTO: 40.3 % (ref 40–53)
HGB BLD-MCNC: 12.9 G/DL (ref 13.3–17.7)
MAGNESIUM SERPL-MCNC: 2.3 MG/DL (ref 1.6–2.3)
MCH RBC QN AUTO: 29.4 PG (ref 26.5–33)
MCHC RBC AUTO-ENTMCNC: 32 G/DL (ref 31.5–36.5)
MCV RBC AUTO: 92 FL (ref 78–100)
PHOSPHATE SERPL-MCNC: 2.6 MG/DL (ref 2.5–4.5)
PLATELET # BLD AUTO: 147 10E3/UL (ref 150–450)
POTASSIUM BLD-SCNC: 3.5 MMOL/L (ref 3.4–5.3)
RBC # BLD AUTO: 4.39 10E6/UL (ref 4.4–5.9)
SODIUM SERPL-SCNC: 140 MMOL/L (ref 133–144)
WBC # BLD AUTO: 6.9 10E3/UL (ref 4–11)

## 2021-11-15 PROCEDURE — 999N000157 HC STATISTIC RCP TIME EA 10 MIN

## 2021-11-15 PROCEDURE — 120N000001 HC R&B MED SURG/OB

## 2021-11-15 PROCEDURE — 250N000013 HC RX MED GY IP 250 OP 250 PS 637: Performed by: HOSPITALIST

## 2021-11-15 PROCEDURE — 94640 AIRWAY INHALATION TREATMENT: CPT | Mod: 76

## 2021-11-15 PROCEDURE — 84100 ASSAY OF PHOSPHORUS: CPT | Performed by: HOSPITALIST

## 2021-11-15 PROCEDURE — 36415 COLL VENOUS BLD VENIPUNCTURE: CPT | Performed by: INTERNAL MEDICINE

## 2021-11-15 PROCEDURE — 94640 AIRWAY INHALATION TREATMENT: CPT

## 2021-11-15 PROCEDURE — 80048 BASIC METABOLIC PNL TOTAL CA: CPT | Performed by: HOSPITALIST

## 2021-11-15 PROCEDURE — 250N000013 HC RX MED GY IP 250 OP 250 PS 637: Performed by: INTERNAL MEDICINE

## 2021-11-15 PROCEDURE — 99232 SBSQ HOSP IP/OBS MODERATE 35: CPT | Performed by: INTERNAL MEDICINE

## 2021-11-15 PROCEDURE — 250N000009 HC RX 250: Performed by: HOSPITALIST

## 2021-11-15 PROCEDURE — 250N000009 HC RX 250: Performed by: INTERNAL MEDICINE

## 2021-11-15 PROCEDURE — 250N000011 HC RX IP 250 OP 636: Performed by: HOSPITALIST

## 2021-11-15 PROCEDURE — 999N000128 HC STATISTIC PERIPHERAL IV START W/O US GUIDANCE

## 2021-11-15 PROCEDURE — 85014 HEMATOCRIT: CPT | Performed by: INTERNAL MEDICINE

## 2021-11-15 PROCEDURE — 83735 ASSAY OF MAGNESIUM: CPT | Performed by: HOSPITALIST

## 2021-11-15 RX ADMIN — HYDROCORTISONE 5 MG: 5 TABLET ORAL at 14:20

## 2021-11-15 RX ADMIN — ALBUTEROL SULFATE 2.5 MG: 2.5 SOLUTION RESPIRATORY (INHALATION) at 09:08

## 2021-11-15 RX ADMIN — METOCLOPRAMIDE HYDROCHLORIDE 10 MG: 5 SOLUTION ORAL at 09:27

## 2021-11-15 RX ADMIN — ACETYLCYSTEINE 2 ML: 200 SOLUTION ORAL; RESPIRATORY (INHALATION) at 16:29

## 2021-11-15 RX ADMIN — ACETYLCYSTEINE 2 ML: 200 SOLUTION ORAL; RESPIRATORY (INHALATION) at 11:48

## 2021-11-15 RX ADMIN — Medication 1 PACKET: at 13:01

## 2021-11-15 RX ADMIN — ALBUTEROL SULFATE 2.5 MG: 2.5 SOLUTION RESPIRATORY (INHALATION) at 16:29

## 2021-11-15 RX ADMIN — CALCIUM CARBONATE 1250 MG: 1250 SUSPENSION ORAL at 09:23

## 2021-11-15 RX ADMIN — POTASSIUM & SODIUM PHOSPHATES POWDER PACK 280-160-250 MG 1 PACKET: 280-160-250 PACK at 21:27

## 2021-11-15 RX ADMIN — METOCLOPRAMIDE HYDROCHLORIDE 10 MG: 5 SOLUTION ORAL at 13:06

## 2021-11-15 RX ADMIN — BRIVARACETAM 100 MG: 10 SOLUTION ORAL at 09:23

## 2021-11-15 RX ADMIN — METOCLOPRAMIDE HYDROCHLORIDE 10 MG: 5 SOLUTION ORAL at 16:02

## 2021-11-15 RX ADMIN — Medication 40 MG: at 09:21

## 2021-11-15 RX ADMIN — SODIUM BICARBONATE 650 MG TABLET 650 MG: at 09:16

## 2021-11-15 RX ADMIN — CARBAMAZEPINE 150 MG: 100 SUSPENSION ORAL at 06:22

## 2021-11-15 RX ADMIN — ALBUTEROL SULFATE 2.5 MG: 2.5 SOLUTION RESPIRATORY (INHALATION) at 11:48

## 2021-11-15 RX ADMIN — PIPERACILLIN SODIUM AND TAZOBACTAM SODIUM 4.5 G: 4; .5 INJECTION, POWDER, LYOPHILIZED, FOR SOLUTION INTRAVENOUS at 00:33

## 2021-11-15 RX ADMIN — ACETYLCYSTEINE 2 ML: 200 SOLUTION ORAL; RESPIRATORY (INHALATION) at 21:10

## 2021-11-15 RX ADMIN — POTASSIUM & SODIUM PHOSPHATES POWDER PACK 280-160-250 MG 1 PACKET: 280-160-250 PACK at 09:16

## 2021-11-15 RX ADMIN — ASPIRIN 81 MG CHEWABLE TABLET 81 MG: 81 TABLET CHEWABLE at 09:16

## 2021-11-15 RX ADMIN — ALBUTEROL SULFATE 2.5 MG: 2.5 SOLUTION RESPIRATORY (INHALATION) at 21:10

## 2021-11-15 RX ADMIN — PIPERACILLIN SODIUM AND TAZOBACTAM SODIUM 4.5 G: 4; .5 INJECTION, POWDER, LYOPHILIZED, FOR SOLUTION INTRAVENOUS at 19:16

## 2021-11-15 RX ADMIN — CARBAMAZEPINE 100 MG: 100 SUSPENSION ORAL at 19:17

## 2021-11-15 RX ADMIN — ACETYLCYSTEINE 2 ML: 200 SOLUTION ORAL; RESPIRATORY (INHALATION) at 09:08

## 2021-11-15 RX ADMIN — POTASSIUM & SODIUM PHOSPHATES POWDER PACK 280-160-250 MG 1 PACKET: 280-160-250 PACK at 16:02

## 2021-11-15 RX ADMIN — HYDROCORTISONE 15 MG: 10 TABLET ORAL at 09:16

## 2021-11-15 RX ADMIN — CARBAMAZEPINE 150 MG: 100 SUSPENSION ORAL at 13:18

## 2021-11-15 RX ADMIN — PIPERACILLIN SODIUM AND TAZOBACTAM SODIUM 4.5 G: 4; .5 INJECTION, POWDER, LYOPHILIZED, FOR SOLUTION INTRAVENOUS at 06:23

## 2021-11-15 RX ADMIN — METOCLOPRAMIDE HYDROCHLORIDE 10 MG: 5 SOLUTION ORAL at 19:17

## 2021-11-15 RX ADMIN — PIPERACILLIN SODIUM AND TAZOBACTAM SODIUM 4.5 G: 4; .5 INJECTION, POWDER, LYOPHILIZED, FOR SOLUTION INTRAVENOUS at 13:09

## 2021-11-15 RX ADMIN — CALCIUM CARBONATE 1250 MG: 1250 SUSPENSION ORAL at 21:27

## 2021-11-15 RX ADMIN — LEVOTHYROXINE SODIUM 150 MCG: 150 TABLET ORAL at 19:17

## 2021-11-15 RX ADMIN — BRIVARACETAM 100 MG: 10 SOLUTION ORAL at 21:27

## 2021-11-15 ASSESSMENT — ACTIVITIES OF DAILY LIVING (ADL)
ADLS_ACUITY_SCORE: 40
ADLS_ACUITY_SCORE: 40
ADLS_ACUITY_SCORE: 36
ADLS_ACUITY_SCORE: 40
ADLS_ACUITY_SCORE: 36
ADLS_ACUITY_SCORE: 40
ADLS_ACUITY_SCORE: 36
ADLS_ACUITY_SCORE: 40
ADLS_ACUITY_SCORE: 36
ADLS_ACUITY_SCORE: 40
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
## 2021-11-15 NOTE — PLAN OF CARE
DATE & TIME: 11/15/2021 days     Cognitive Concerns/ Orientation :  unable to assess pt is non verbal    BEHAVIOR & AGGRESSION TOOL COLOR:  green  CIWA SCORE:  na    ABNL VS/O2:  vss RA   MOBILITY: total cares needs to be turned and repo every 2 hours   PAIN MANAGMENT:  appears to be comfortable   DIET:  tube feeding  BOWEL/BLADDER:  incontinent using male external catheter and incontinent of stool had one large stool this shift   ABNL LAB/BG: k 3.5  DRAIN/DEVICES: male external catheter, SL   TELEMETRY RHYTHM:  na   SKIN:  periarea red and barrier applied  TESTS/PROCEDURES:  none   D/C DATE:  1-2 days   Discharge Barriers:  needs to be change over from IV antibiotics to po antibiotics.   OTHER IMPORTANT INFO:  Pt tolerating tube feeding gastric port is draining into a bag. Pt was turned and repo every 2 hours and coughing up some sputum but sometimes swallows and doesn't like to have oral suctioning done. Was able to get some out with mouth swabs. VSS afebrile .

## 2021-11-15 NOTE — PLAN OF CARE
DATE & TIME: 11/14/21 ([id number])  Cognitive Concerns/ Orientation : Patient is non-verbal. Can make hand signals; will nod or use thumbs up/thumbs down to indicate yes/no   BEHAVIOR & AGGRESSION TOOL COLOR: Green. Calm and cooperative  ABNL VS/O2: VSS on RA  MOBILITY: Total. Assist x2, lift. T/R  PAIN MANAGMENT: Denies pain  DIET: NPO, tube feeding at goal rate of 55 mL/hr, water flushes 160 q4h  BOWEL/BLADDER: Incontinent of bowels and bladder. 1 BM this shift. Using male external catheter, good output.  ABNL LAB/BG: Lactic acid 2.1  on admission. Potassium 3.6, WBC 7.0  DRAIN/DEVICES: R hand PIV SL, q6h IV Zosyn   TELEMETRY RHYTHM: NA  SKIN: Tender in sacrum/coccyx area, no open areas  TESTS/PROCEDURES: NA  D/C DATE: Expected discharge pending improvement   OTHER IMPORTANT INFO: Contact precautions maintained MRSA, VRE.

## 2021-11-16 ENCOUNTER — APPOINTMENT (OUTPATIENT)
Dept: INTERVENTIONAL RADIOLOGY/VASCULAR | Facility: CLINIC | Age: 61
DRG: 871 | End: 2021-11-16
Attending: INTERNAL MEDICINE
Payer: MEDICARE

## 2021-11-16 PROCEDURE — 250N000013 HC RX MED GY IP 250 OP 250 PS 637: Performed by: INTERNAL MEDICINE

## 2021-11-16 PROCEDURE — C1769 GUIDE WIRE: HCPCS

## 2021-11-16 PROCEDURE — 272N000584 ZZ HC TUBE GASTRO CR13

## 2021-11-16 PROCEDURE — 250N000009 HC RX 250: Performed by: INTERNAL MEDICINE

## 2021-11-16 PROCEDURE — 250N000013 HC RX MED GY IP 250 OP 250 PS 637: Performed by: HOSPITALIST

## 2021-11-16 PROCEDURE — 94640 AIRWAY INHALATION TREATMENT: CPT | Mod: 76

## 2021-11-16 PROCEDURE — 49452 REPLACE G-J TUBE PERC: CPT

## 2021-11-16 PROCEDURE — 999N000157 HC STATISTIC RCP TIME EA 10 MIN

## 2021-11-16 PROCEDURE — 0D2DXUZ CHANGE FEEDING DEVICE IN LOWER INTESTINAL TRACT, EXTERNAL APPROACH: ICD-10-PCS | Performed by: RADIOLOGY

## 2021-11-16 PROCEDURE — 250N000009 HC RX 250: Performed by: HOSPITALIST

## 2021-11-16 PROCEDURE — 250N000011 HC RX IP 250 OP 636: Performed by: HOSPITALIST

## 2021-11-16 PROCEDURE — 99232 SBSQ HOSP IP/OBS MODERATE 35: CPT | Performed by: INTERNAL MEDICINE

## 2021-11-16 PROCEDURE — 120N000001 HC R&B MED SURG/OB

## 2021-11-16 PROCEDURE — 272N000116 HC CATH CR1

## 2021-11-16 PROCEDURE — 94640 AIRWAY INHALATION TREATMENT: CPT

## 2021-11-16 RX ADMIN — CARBAMAZEPINE 100 MG: 100 SUSPENSION ORAL at 18:01

## 2021-11-16 RX ADMIN — METOCLOPRAMIDE HYDROCHLORIDE 10 MG: 5 SOLUTION ORAL at 17:29

## 2021-11-16 RX ADMIN — PIPERACILLIN SODIUM AND TAZOBACTAM SODIUM 4.5 G: 4; .5 INJECTION, POWDER, LYOPHILIZED, FOR SOLUTION INTRAVENOUS at 01:28

## 2021-11-16 RX ADMIN — ALBUTEROL SULFATE 2.5 MG: 2.5 SOLUTION RESPIRATORY (INHALATION) at 19:54

## 2021-11-16 RX ADMIN — CARBAMAZEPINE 150 MG: 100 SUSPENSION ORAL at 01:28

## 2021-11-16 RX ADMIN — CARBAMAZEPINE 150 MG: 100 SUSPENSION ORAL at 23:53

## 2021-11-16 RX ADMIN — ALBUTEROL SULFATE 2.5 MG: 2.5 SOLUTION RESPIRATORY (INHALATION) at 15:53

## 2021-11-16 RX ADMIN — AMOXICILLIN AND CLAVULANATE POTASSIUM 1 TABLET: 875; 125 TABLET, FILM COATED ORAL at 20:21

## 2021-11-16 RX ADMIN — Medication 1 PACKET: at 18:02

## 2021-11-16 RX ADMIN — HYDROCORTISONE 5 MG: 5 TABLET ORAL at 14:46

## 2021-11-16 RX ADMIN — BRIVARACETAM 100 MG: 10 SOLUTION ORAL at 14:49

## 2021-11-16 RX ADMIN — SODIUM BICARBONATE 650 MG TABLET 650 MG: at 14:46

## 2021-11-16 RX ADMIN — CARBAMAZEPINE 150 MG: 100 SUSPENSION ORAL at 06:07

## 2021-11-16 RX ADMIN — METOCLOPRAMIDE HYDROCHLORIDE 10 MG: 5 SOLUTION ORAL at 20:21

## 2021-11-16 RX ADMIN — ACETYLCYSTEINE 2 ML: 200 SOLUTION ORAL; RESPIRATORY (INHALATION) at 19:54

## 2021-11-16 RX ADMIN — METOCLOPRAMIDE HYDROCHLORIDE 10 MG: 5 SOLUTION ORAL at 13:07

## 2021-11-16 RX ADMIN — Medication 40 MG: at 14:49

## 2021-11-16 RX ADMIN — ALBUTEROL SULFATE 2.5 MG: 2.5 SOLUTION RESPIRATORY (INHALATION) at 07:46

## 2021-11-16 RX ADMIN — POTASSIUM & SODIUM PHOSPHATES POWDER PACK 280-160-250 MG 1 PACKET: 280-160-250 PACK at 21:57

## 2021-11-16 RX ADMIN — ACETYLCYSTEINE 2 ML: 200 SOLUTION ORAL; RESPIRATORY (INHALATION) at 15:53

## 2021-11-16 RX ADMIN — LEVOTHYROXINE SODIUM 150 MCG: 150 TABLET ORAL at 17:29

## 2021-11-16 RX ADMIN — CARBAMAZEPINE 150 MG: 100 SUSPENSION ORAL at 14:49

## 2021-11-16 RX ADMIN — ACETYLCYSTEINE 2 ML: 200 SOLUTION ORAL; RESPIRATORY (INHALATION) at 07:46

## 2021-11-16 RX ADMIN — METOCLOPRAMIDE HYDROCHLORIDE 10 MG: 5 SOLUTION ORAL at 09:43

## 2021-11-16 RX ADMIN — CALCIUM CARBONATE 1250 MG: 1250 SUSPENSION ORAL at 21:57

## 2021-11-16 RX ADMIN — ACETYLCYSTEINE 2 ML: 200 SOLUTION ORAL; RESPIRATORY (INHALATION) at 12:06

## 2021-11-16 RX ADMIN — BRIVARACETAM 100 MG: 10 SOLUTION ORAL at 22:49

## 2021-11-16 RX ADMIN — ASPIRIN 81 MG CHEWABLE TABLET 81 MG: 81 TABLET CHEWABLE at 14:46

## 2021-11-16 RX ADMIN — PIPERACILLIN SODIUM AND TAZOBACTAM SODIUM 4.5 G: 4; .5 INJECTION, POWDER, LYOPHILIZED, FOR SOLUTION INTRAVENOUS at 09:46

## 2021-11-16 RX ADMIN — CALCIUM CARBONATE 1250 MG: 1250 SUSPENSION ORAL at 14:49

## 2021-11-16 RX ADMIN — POTASSIUM & SODIUM PHOSPHATES POWDER PACK 280-160-250 MG 1 PACKET: 280-160-250 PACK at 14:52

## 2021-11-16 RX ADMIN — PIPERACILLIN SODIUM AND TAZOBACTAM SODIUM 4.5 G: 4; .5 INJECTION, POWDER, LYOPHILIZED, FOR SOLUTION INTRAVENOUS at 15:02

## 2021-11-16 RX ADMIN — ALBUTEROL SULFATE 2.5 MG: 2.5 SOLUTION RESPIRATORY (INHALATION) at 12:06

## 2021-11-16 ASSESSMENT — ACTIVITIES OF DAILY LIVING (ADL)
ADLS_ACUITY_SCORE: 40
ADLS_ACUITY_SCORE: 36
ADLS_ACUITY_SCORE: 36
ADLS_ACUITY_SCORE: 40
ADLS_ACUITY_SCORE: 36
ADLS_ACUITY_SCORE: 40
ADLS_ACUITY_SCORE: 40
ADLS_ACUITY_SCORE: 36
ADLS_ACUITY_SCORE: 36
ADLS_ACUITY_SCORE: 40
ADLS_ACUITY_SCORE: 36
ADLS_ACUITY_SCORE: 40
ADLS_ACUITY_SCORE: 36
ADLS_ACUITY_SCORE: 36
ADLS_ACUITY_SCORE: 40
ADLS_ACUITY_SCORE: 40
ADLS_ACUITY_SCORE: 36
ADLS_ACUITY_SCORE: 40
ADLS_ACUITY_SCORE: 36
ADLS_ACUITY_SCORE: 40
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
## 2021-11-16 NOTE — PLAN OF CARE
+MRSA & VRE; precautions maintained. Pt is non-verbal; HECTOR orientation. Pt is able to make hand signals; will nod or use thumbs up/down to indicate yes/no. Pt very pleasant with staff. VSS on RA; productive cough noted. Total care; T/R q2hr. Denies pain. Absence of nonverbal indicators of pain; resting between cares. NPO; continuous tube feeding @ 55 mL/hr (goal rate) w/ 160 mL water flushes q4hr. G/J tube in place; open to gravity drainage. Incontinent of B/B; male purewick in place. x1 BM this shift. Mepilex to coccyx for protection. PIV; SL with intermittent abx. Discharge 1-2 days per MD note; pending stable respiratory status. Continue to monitor

## 2021-11-16 NOTE — PLAN OF CARE
DATE & TIME: 11/15/2021 3-11pm  Cognitive Concerns/ Orientation : Patient is non-verbal. Can make hand signals; will nod or use thumbs up/thumbs down to indicate yes/no   BEHAVIOR & AGGRESSION TOOL COLOR: Green.  ABNL VS/O2: VSS on RA  MOBILITY: Total. Assist x2, lift. T/R  PAIN MANAGMENT: Denies pain  DIET: NPO, tube feeding at goal rate of 55 mL/hr, water flushes 160 q4h  BOWEL/BLADDER: Incontinent of bowels and bladder. 1 BM this shift. Using male external catheter, good output.  ABNL LAB/BG:   DRAIN/DEVICES: L hand PIV SL, q6h IV Zosyn   TELEMETRY RHYTHM: NA  SKIN: Tender in sacrum/coccyx area, no open areas  TESTS/PROCEDURES: NA  D/C DATE: pending   OTHER IMPORTANT INFO: Contact precautions maintained MRSA, VRE.

## 2021-11-16 NOTE — PROGRESS NOTES
Tube feeding alarming and noted fluid in the bed. Attempted to irrigate tube feeding and another staff member attempted to irrigate and unable to irrigate. MD paged.

## 2021-11-16 NOTE — PROGRESS NOTES
Interventional Radiology - Progress Note  Inpatient - [facility]  9/22/2021    IR Brief Note    IR consulted for clogged J portion of G-J tube. Known to our service from multiple interventions on G-J, most recently exchange on 5/25/21. Attempts to unclog J portion at bedside were unsuccessful. Will schedule for G-J tube exchange. Telephone consent obtained from mother who verbalized understanding of procedure risks, benefits, details, and alternatives.     Rikki Marx PA-C  Interventional Radiology  [phone] (IR)  [phone] ([facility] Office)

## 2021-11-16 NOTE — CONSULTS
Care Management Initial Consult     General Information  Assessment completed with: Parents, Mother Savannah  Type of CM/SW Visit: Offer D/C Planning     Primary Care Provider verified and updated as needed: Yes   Readmission within the last 30 days: lack of support   Return Category: Exacerbation of disease  Reason for Consult: discharge planning  Advance Care Planning:             Communication Assessment  Patient's communication style: spoken language (English or Bilingual)    Hearing Difficulty or Deaf: no   Wear Glasses or Blind: no     Cognitive  Cognitive/Neuro/Behavioral: .WDL except, all  Level of Consciousness: other (see comments) (non verbal)  Arousal Level: arouses to pain, opens eyes spontaneously  Orientation: other (see comments) (non verbal)  Mood/Behavior: cooperative     Speech: HECTOR (unable to assess)     Living Environment:   People in home: parent(s)     Current living Arrangements: house      Able to return to prior arrangements: yes        Family/Social Support:  Care provided by: parent(s), homecare agency  Provides care for: no one, unable/limited ability to care for self  Marital Status: Single  Parent(s)          Description of Support System: Involved    Support Assessment: Adequate family and caregiver support, Lacks adequate physical care, Other (see comments)     Current Resources:   Patient receiving home care services: Yes  Skilled Home Care Services: Skilled Nursing, Home Health Aid  Community Resources: Home Care, PCA  Equipment currently used at home: commode chair, grab bar, toilet, grab bar, tub/shower, wheelchair, power, wheelchair, manual  Supplies currently used at home:       Employment/Financial:  Employment Status: disabled        Financial Concerns: No concerns identified            Lifestyle & Psychosocial Needs:  Social Determinants of Health          Tobacco Use: Medium Risk     Smoking Tobacco Use: Former Smoker     Smokeless Tobacco Use: Never Used   Alcohol Use:       Frequency of Alcohol Consumption:      Average Number of Drinks:      Frequency of Binge Drinking:    Financial Resource Strain:      Difficulty of Paying Living Expenses:    Food Insecurity:      Worried About Running Out of Food in the Last Year:      Ran Out of Food in the Last Year:    Transportation Needs:      Lack of Transportation (Medical):      Lack of Transportation (Non-Medical):    Physical Activity:      Days of Exercise per Week:      Minutes of Exercise per Session:    Stress:      Feeling of Stress :    Social Connections:      Frequency of Communication with Friends and Family:      Frequency of Social Gatherings with Friends and Family:      Attends Advent Services:      Active Member of Clubs or Organizations:      Attends Club or Organization Meetings:      Marital Status:    Intimate Partner Violence:      Fear of Current or Ex-Partner:      Emotionally Abused:      Physically Abused:      Sexually Abused:    Depression: Not at risk     PHQ-2 Score: 0   Housing Stability:      Unable to Pay for Housing in the Last Year:      Number of Places Lived in the Last Year:      Unstable Housing in the Last Year:          Functional Status:  Prior to admission patient needed assistance:   Dependent ADLs:: Bathing, Dressing, Eating, Grooming, Incontinence, Positioning, Transfers  Dependent IADLs:: Cooking, Medication Management, Money Management, Transportation, Incontinence        Mental Health Status:  Mental Health Status: No Current Concerns        Chemical Dependency Status:  Chemical Dependency Status: No Current Concerns              Values/Beliefs:  Spiritual, Cultural Beliefs, Advent Practices, Values that affect care: no                Additional Information:  Per Savannah (Mother), pt has the following services in place:[facility] (phone [phone], fax [phone])  Approved for 12 hours RN coverage daily, but unable to get the help, so pt only has a 12 hr nurse on Wednesday  and Thursday in the past  Currently has none  .  His mother fulfills the rest of the care  Discussed patient going to a TCU as it is getting difficult for Savannah (mother) to care for patient. and Savannah declined.   Savannah stated it would be good for her to get a break so she could do house chores. Discussed that writer will speak with patients  in AM to see if assistance  Could be provided     [facility] (12 hours [facility] evening / night)   [facility]  For TF supplies  [facility] for Tube Feeding     Endocrine from the [place], Dr Faizan Roth.  Primary Physician Carlos Bangura [phone]      Per MD patient will be ready for discharge tomorrow and patients mother Savannah aware of this.               Anita Casillas RN  Inpatient Care Coordinator  [facility]  #  [phone]

## 2021-11-16 NOTE — PLAN OF CARE
DATE & TIME: 11/16/2021 days     Cognitive Concerns/ Orientation : alert and nonverbal    BEHAVIOR & AGGRESSION TOOL COLOR: green   CIWA SCORE:  na    ABNL VS/O2:  VSS RA   MOBILITY:  up with assistance of 2 and lift   PAIN MANAGMENT: appears comfortable   DIET: Tube feeding  Jevity Tube plugged at 0800  BOWEL/BLADDER:  incontinent of bladder and bowel   ABNL LAB/BG:    DRAIN/DEVICES:  SL PEG tube   TELEMETRY RHYTHM:  na   SKIN:  brusied and periarea red   TESTS/PROCEDURES:  down for PEG tube replacement   D/C DATE:  1-2 days   Discharge Barriers:  tube feeding being replaced, still on IV antibiotics, need to change to po   OTHER IMPORTANT INFO:  pt was found with tube feeding plugged this am attempted to irrigate and unable to. MD paged and orders to replace IR came to try and unplug tube and unable to. Pt now down for new PEG.  Turned and repo guy 2 hours

## 2021-11-16 NOTE — IR NOTE
Patient Name: Keyon Farias  Medical Record Number: [id number]  Today's Date: November 16, 2021    Start Time: 1403  End of procedure time: 1411  Procedure: gastrojejunostomy tube change  Report given to: NANCI Degroot  Time pt departs:  1415    Other Notes: Pt into IR suite 2 via cart. Pt awake and alert. To table in supine position prepped and draped with 2%. Dr. Dunn in room. Time out and procedure started. Pt tolerated procedure well. Debrief with Dr. Dunn. Pressure held until hemostasis achieved. Dressing CDI. No complications. Pt transferred back to . Report given to Emily CHRISTINE.        Corinna Mansfield RN

## 2021-11-17 VITALS
SYSTOLIC BLOOD PRESSURE: 108 MMHG | DIASTOLIC BLOOD PRESSURE: 60 MMHG | RESPIRATION RATE: 20 BRPM | WEIGHT: 162.2 LBS | OXYGEN SATURATION: 94 % | BODY MASS INDEX: 22.31 KG/M2 | HEART RATE: 80 BPM | TEMPERATURE: 98.2 F

## 2021-11-17 PROCEDURE — 250N000009 HC RX 250: Performed by: INTERNAL MEDICINE

## 2021-11-17 PROCEDURE — 94640 AIRWAY INHALATION TREATMENT: CPT | Mod: 76

## 2021-11-17 PROCEDURE — 250N000009 HC RX 250: Performed by: HOSPITALIST

## 2021-11-17 PROCEDURE — 99238 HOSP IP/OBS DSCHRG MGMT 30/<: CPT | Performed by: INTERNAL MEDICINE

## 2021-11-17 PROCEDURE — 250N000013 HC RX MED GY IP 250 OP 250 PS 637: Performed by: INTERNAL MEDICINE

## 2021-11-17 PROCEDURE — 999N000157 HC STATISTIC RCP TIME EA 10 MIN

## 2021-11-17 PROCEDURE — 250N000013 HC RX MED GY IP 250 OP 250 PS 637: Performed by: HOSPITALIST

## 2021-11-17 PROCEDURE — 94640 AIRWAY INHALATION TREATMENT: CPT

## 2021-11-17 RX ORDER — ALBUTEROL SULFATE 0.83 MG/ML
2.5 SOLUTION RESPIRATORY (INHALATION) EVERY 4 HOURS
Status: DISCONTINUED | OUTPATIENT
Start: 2021-11-17 | End: 2021-11-17 | Stop reason: HOSPADM

## 2021-11-17 RX ADMIN — ASPIRIN 81 MG CHEWABLE TABLET 81 MG: 81 TABLET CHEWABLE at 09:17

## 2021-11-17 RX ADMIN — METOCLOPRAMIDE HYDROCHLORIDE 10 MG: 5 SOLUTION ORAL at 09:18

## 2021-11-17 RX ADMIN — Medication 1 PACKET: at 10:43

## 2021-11-17 RX ADMIN — HYDROCORTISONE 5 MG: 5 TABLET ORAL at 14:28

## 2021-11-17 RX ADMIN — SODIUM BICARBONATE 650 MG TABLET 650 MG: at 09:17

## 2021-11-17 RX ADMIN — ALBUTEROL SULFATE 2.5 MG: 2.5 SOLUTION RESPIRATORY (INHALATION) at 02:31

## 2021-11-17 RX ADMIN — ALBUTEROL SULFATE 2.5 MG: 2.5 SOLUTION RESPIRATORY (INHALATION) at 12:29

## 2021-11-17 RX ADMIN — POTASSIUM & SODIUM PHOSPHATES POWDER PACK 280-160-250 MG 1 PACKET: 280-160-250 PACK at 16:03

## 2021-11-17 RX ADMIN — CARBAMAZEPINE 150 MG: 100 SUSPENSION ORAL at 07:07

## 2021-11-17 RX ADMIN — ACETYLCYSTEINE 2 ML: 200 SOLUTION ORAL; RESPIRATORY (INHALATION) at 16:07

## 2021-11-17 RX ADMIN — Medication 40 MG: at 09:18

## 2021-11-17 RX ADMIN — AMOXICILLIN AND CLAVULANATE POTASSIUM 1 TABLET: 875; 125 TABLET, FILM COATED ORAL at 09:17

## 2021-11-17 RX ADMIN — CARBAMAZEPINE 150 MG: 100 SUSPENSION ORAL at 12:32

## 2021-11-17 RX ADMIN — METOCLOPRAMIDE HYDROCHLORIDE 10 MG: 5 SOLUTION ORAL at 12:32

## 2021-11-17 RX ADMIN — ACETYLCYSTEINE 2 ML: 200 SOLUTION ORAL; RESPIRATORY (INHALATION) at 06:57

## 2021-11-17 RX ADMIN — HYDROCORTISONE 15 MG: 10 TABLET ORAL at 09:17

## 2021-11-17 RX ADMIN — ACETYLCYSTEINE 2 ML: 200 SOLUTION ORAL; RESPIRATORY (INHALATION) at 12:29

## 2021-11-17 RX ADMIN — CALCIUM CARBONATE 1250 MG: 1250 SUSPENSION ORAL at 09:18

## 2021-11-17 RX ADMIN — BRIVARACETAM 100 MG: 10 SOLUTION ORAL at 09:18

## 2021-11-17 RX ADMIN — ALBUTEROL SULFATE 2.5 MG: 2.5 SOLUTION RESPIRATORY (INHALATION) at 06:57

## 2021-11-17 RX ADMIN — ALBUTEROL SULFATE 2.5 MG: 2.5 SOLUTION RESPIRATORY (INHALATION) at 16:07

## 2021-11-17 RX ADMIN — POTASSIUM & SODIUM PHOSPHATES POWDER PACK 280-160-250 MG 1 PACKET: 280-160-250 PACK at 09:18

## 2021-11-17 RX ADMIN — METOCLOPRAMIDE HYDROCHLORIDE 10 MG: 5 SOLUTION ORAL at 16:03

## 2021-11-17 ASSESSMENT — ACTIVITIES OF DAILY LIVING (ADL)
ADLS_ACUITY_SCORE: 38
ADLS_ACUITY_SCORE: 36
ADLS_ACUITY_SCORE: 38
ADLS_ACUITY_SCORE: 36
ADLS_ACUITY_SCORE: 40
ADLS_ACUITY_SCORE: 38
ADLS_ACUITY_SCORE: 36
ADLS_ACUITY_SCORE: 36
ADLS_ACUITY_SCORE: 38
ADLS_ACUITY_SCORE: 40
ADLS_ACUITY_SCORE: 40
ADLS_ACUITY_SCORE: 36
ADLS_ACUITY_SCORE: 38
ADLS_ACUITY_SCORE: 36
ADLS_ACUITY_SCORE: 38
ADLS_ACUITY_SCORE: 38
ADLS_ACUITY_SCORE: 36
ADLS_ACUITY_SCORE: 38
ADLS_ACUITY_SCORE: 36

## 2021-11-17 NOTE — PLAN OF CARE
DATE & TIME: 11/17/2021 2917-6077   Cognitive Concerns/ Orientation : Patient is non-verbal. Can make hand signals; will nod or use thumbs up/thumbs down to indicate yes/no   BEHAVIOR & AGGRESSION TOOL COLOR: Green. Calm and cooperative  ABNL VS/O2: VSS on RA  MOBILITY: Total. Assist x2, lift. T/R  PAIN MANAGMENT: Denies pain  DIET: NPO, tube feeding   BOWEL/BLADDER: Incontinent of bowels and bladder. Was using the external cath and was leaking so off for now.  ABNL LAB/BG:   DRAIN/DEVICES: G tube  TELEMETRY RHYTHM: NA  SKIN: Tender in sacrum/coccyx area, no open areas  TESTS/PROCEDURES: NA  D/C DATE: Expected discharge pending improvement   OTHER IMPORTANT INFO: Mother is legal guardian. Contact precautions maintained MRSA, VRE.

## 2021-11-17 NOTE — DISCHARGE SUMMARY
[facility]    Hospitalist Discharge Summary       Date of Admission:  11/12/2021  Date of Discharge:  11/17/2021  Discharging Provider: Yoni Vicente MD    Discharge Diagnoses   Recurrent aspiration pneumonia with severe sepsis    Follow-ups Needed After Discharge   Follow-up Appointments     Follow-up and recommended labs and tests       Follow up with primary care provider, Carlos Gomez, within 7 days for   hospital follow- up.  No follow up labs or test are needed.           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 Found to have suspected sepsis due to recurrent bilateral aspiration pneumonia.   Has been hospitalized here many times in the past several years for recurrent sepsis due to recurrent aspiration pneumonia vs pneumonitis (often presents with rapidly resolving severe sepsis). Has a G tube managed by Dr. Davis of [facility].    Most recent hospitalization here was from 10/28/21 to 10/30/21 for the same presentation and issue as this stay. This admission presented with recurrent fever and lethargy. Febrile with TMax 102, tachycardic. WBC nl. Lactate 2.4. Procalcitonin neg. COVID/influenza swabs neg. CXR showed unchanged appearing opacifications. Clinical picture suggestive of acute on chronic aspiration.   Patient was treated with Zosyn and then  transitioned to Augmentin once his clinical status improved. Patient is stable to discharge to TCU however patient's mother Savannah elected to take him home, as has been the case with previous admissions.    Consultations This Hospital Stay   CARE MANAGEMENT / SOCIAL WORK IP CONSULT  NUTRITION SERVICES ADULT IP CONSULT  PHARMACY IP CONSULT  PHYSICAL THERAPY ADULT IP CONSULT    Code Status   Full Code    Time Spent on this Encounter   I, Yoni Vicente, personally saw the patient today and spent approximately 25 minutes discharging this patient.       Yoni Vicente MD  [facility]  ______________________________________________________________________    Physical Exam   Vital Signs: Temp: 98.2  F (36.8  C) Temp src: Oral BP: 108/60 Pulse: 80   Resp: 20 SpO2: 94 % O2 Device: None (Room air)    Weight: 162 lbs 3.2 oz    Constitutional: Male with intellectual disability in NAD  HEENT: Eyes nonicteric, oral mucosa moist  Cardiovascular: RRR, normal S1/2, no m/r/g  Respiratory: CTAB, with occasional wet cough  Vascular: No LE pitting edema  GI: Normoactive bowel sounds, nontender,  Skin/Integumen: No rashes  Neuro/Psych: Appropriate affect, essentially non verbal, moves all extremities       Primary Care Physician   Carlos Gomez    Discharge Disposition   Discharged to home  Condition at discharge: Stable    Significant Results and Procedures   Most Recent 3 CBC's:Recent Labs   Lab Test 11/15/21  0956 11/14/21  1152 11/13/21  1149   WBC 6.9 7.0 6.0   HGB 12.9* 12.3* 12.4*   MCV 92 93 94   * 122* 123*     Most Recent 3 BMP's:Recent Labs   Lab Test 11/15/21  0956 11/14/21  1152 11/14/21  0919 11/13/21 2012 11/13/21  1149    139  --   --  144   POTASSIUM 3.5 3.6  --  4.1 3.3*   CHLORIDE 105 107  --   --  109   CO2 27 28  --   --  25   BUN 7 9  --   --  15   CR 0.89 0.88  --   --  0.96   ANIONGAP 8 4  --   --  10   STEVE 8.3* 7.9*  --   --  7.3*   * 156* 122*  --  126*      Most Recent 2 LFT's:Recent Labs   Lab Test 11/12/21  2241 10/29/21  0826   AST 26 18   ALT 30 23   ALKPHOS 107 87   BILITOTAL 0.3 0.5   ,   Results for orders placed or performed during the hospital encounter of 11/12/21   XR Chest Port 1 View    Narrative    EXAM: XR CHEST PORT 1 VIEW  LOCATION: [facility]  DATE/TIME: 11/12/2021 11:13 PM    INDICATION: Fever  COMPARISON: 10/28/2021.      Impression    IMPRESSION: Persistent patchy infiltrates of both mid and lower lungs suspicious for infectious/inflammatory pneumonitis. This process has improved on the left since 10/28/2021. No significant pleural fluid. No pneumothorax. Normal heart size.   IR Gastro Jejunostomy Tube Change    Narrative    IR GASTRO JEJUNOSTOMY TUBE CHANGE   11/16/2021 2:14 PM    HISTORY:  61-year-old patient with long-standing GJ tube. The tube was  plugged, request made for exchange.    COMPARISON: July 26, 2021.    TECHNIQUE: Patient was brought to the interventional radiology  department and informed consent obtained with patient's family.  Patient was placed in a supine position. The existing GJ tube and  surrounding skin were prepped and draped in standard sterile fashion.  Contrast was injected through the gastric port to confirm appropriate  position. The jejunal port is occluded. Stiff angled Glidewire was  advanced through the gastric port into the jejunum. Balloon was  deflated and tube removed. A new 18 Swazi TORY gastrojejunostomy tube  was placed. Contrast was injected through both gastric and jejunal  lumens to confirm appropriate position.    Sedation: None  Fluoroscopy time: 1.1 minutes  Air Kerma: 3.53 mGy   Contrast: 20 mL of Omnipaque 240 administered into the enteric tract  without complication.  Local anesthetic: None    FINDINGS: Two spot fluoroscopic images confirm appropriate placement  of 18 Swazi TORY gastrojejunostomy tube.      Impression    IMPRESSION: Successful exchange of 18 Swazi  TORY gastrojejunostomy  tube. Tube is ready for immediate use, as before.      KATIUSKA MAI MD         SYSTEM ID:  [id number]       Discharge Orders      Home Care PT Referral for Hospital Discharge      Home Care OT Referral for Hospital Discharge      Home care nursing referral      Reason for your hospital stay    You were hospitalized for recurrent aspiration pneumonia.     Follow-up and recommended labs and tests     Follow up with primary care provider, Carols Gomez, within 7 days for hospital follow- up.  No follow up labs or test are needed.     Activity    Your activity upon discharge: ambulate in house     Diet    Follow this diet upon discharge:       Adult Formula Drip Feeding: Continuous Jevity 1.5; Jejunostomy; Goal Rate: 55; mL/hr; Medication - Feeding Tube Flush Frequency: At least 15-30 mL water before and after medication administration and with tube clogging; Begin @ 35 ml/hr. After 4 h...      NPO for Medical/Clinical Reasons Except for: NPO but receiving Tube Feeding     Discharge Medications   Current Discharge Medication List      START taking these medications    Details   amoxicillin-clavulanate (AUGMENTIN) 875-125 MG tablet Take 1 tablet by mouth every 12 hours for 4 days  Qty: 8 tablet, Refills: 0    Associated Diagnoses: Pneumonia of both lungs due to infectious organism, unspecified part of lung         CONTINUE these medications which have NOT CHANGED    Details   acetaminophen (TYLENOL 8 HOUR) 650 MG CR tablet Take 650 mg by mouth every 8 hours as needed for mild pain or fever      acetylcysteine (MUCOMYST) 20 % neb solution Take 2 mLs by nebulization 4 times daily With albuterol at 0700, 1100, 1500, and 1900       albuterol (PROVENTIL) (5 MG/ML) 0.5% neb solution Take 2.5 mg by nebulization every 4 hours (while awake) 0700 1100 1500 1900 with mucomyst       aspirin (ASA) 81 MG chewable tablet 81 mg by Oral or Feeding Tube route daily At 0900      bacitracin ointment Apply topically  daily as needed for wound care To PEG site.       Brivaracetam (BRIVIACT) 10 MG/ML solution 100 mg by Oral or Feeding Tube route 2 times daily 0900, 2100      calcium carbonate 1250 MG/5ML SUSP suspension Take 1,250 mg by mouth 2 times daily 0900, 2100      !! carBAMazepine (TEGRETOL) 100 MG/5ML suspension Take 100 mg by mouth daily Take at 1800       !! carBAMazepine (TEGRETOL) 100 MG/5ML suspension 150 mg by Oral or Feeding Tube route 3 times daily At 06:00, 12:00, and 24:00 for seizures      clotrimazole-betamethasone (LOTRISONE) 1-0.05 % external cream Apply topically 2 times daily as needed   Qty:        hydrocortisone (CORTEF) 5 MG tablet Take 15 mg (3 tablets) in the morning and 5 mg (1 tablet)  at 2:00 PM. During illness patient takes more as a stress dose. Please increase the dose as directed.  Qty: 400 tablet, Refills: 3    Associated Diagnoses: Secondary adrenal insufficiency (H)      hydrocortisone 1 % CREA cream Place rectally 2 times daily as needed for other Apply to reddened memo areas as needed      levothyroxine (SYNTHROID/LEVOTHROID) 150 MCG tablet Take 1 tablet (150 mcg) by mouth daily  Qty: 90 tablet, Refills: 3    Associated Diagnoses: Secondary hypothyroidism      metoclopramide (REGLAN) 10 MG/10ML SOLN solution Take 10 mg by mouth 4 times daily (before meals and nightly) 0800, 1200, 1600, 2000  Disconnects bag before administration, then waits 45 mins before reconnecting after giving the medication      multivitamin, therapeutic (THERA-VIT) TABS tablet Take 1 tablet by mouth daily      mupirocin (BACTROBAN) 2 % external ointment Apply topically 2 times daily as needed       pantoprazole (PROTONIX) 2 mg/mL SUSP suspension 20 mLs (40 mg) by Per J Tube route daily  Qty: 400 mL, Refills: 0    Comments: Future refills by PCP Dr. Carlos Gomez with phone number [phone].  Associated Diagnoses: Gastroesophageal reflux disease, esophagitis presence not specified      potassium & sodium phosphates  (NEUTRA-PHOS) 280-160-250 MG Packet Take 1 packet by mouth 3 times daily       Scopolamine HBr POWD Dispense #90. Mix contents with small amount of water for admin via J-tube.  Administer 0.8 mg three times each day.  Qty: 450 g, Refills: 1    Associated Diagnoses: Aspiration pneumonia of both lungs due to gastric secretions, unspecified part of lung (H)      Skin Protectants, Misc. (BALMEX SKIN PROTECTANT) OINT Externally apply topically 2 times daily as needed (irritation) Applay to reddened memo areas twice daily as needed      sodium bicarbonate 650 MG tablet Take 1 tablet (650 mg) by mouth daily  Qty: 30 tablet, Refills: 0    Associated Diagnoses: Hyponatremia      vitamin C (ASCORBIC ACID) 1000 MG TABS 1,000 mg by Oral or Feeding Tube route daily       vitamin D3 (CHOLECALCIFEROL) 2000 units (50 mcg) tablet Take 2,000 Units by mouth daily Crush and feed via j-tube @@ 0900      testosterone cypionate (DEPOTESTOSTERONE) 200 MG/ML injection Inject 0.3 mLs (60 mg) into the muscle every 7 days  Qty: 6 mL, Refills: 1    Associated Diagnoses: Panhypopituitarism (H); Hypogonadism male       !! - Potential duplicate medications found. Please discuss with provider.        Allergies   Allergies   Allergen Reactions     Valproic Acid Other (See Comments)     Toxicity w/ bone marrow suspension, elevated ammonia levels      Dilantin [Phenytoin Sodium] Other (See Comments)     Severe Trembling     Scopolamine Hives     Hives with the patch - oral no problem

## 2021-11-17 NOTE — PROGRESS NOTES
Care Management Discharge Note    Discharge Date: 11/17/2021       Discharge Disposition: home     Discharge Services: PCA services     Discharge DME: None      Discharge Transportation:[facility]       Private pay costs discussed: Not applicable      Patient/family educated on Medicare website which has current facility and service quality ratings: Yes     Education Provided on the Discharge Plan:  Yes with mother Savannah  Persons Notified of Discharge Plans: Mother Savannah  Patient/Family in Agreement with the Plan:  yes    Additional Information: Spoke with patients TOVA Menjivar regarding lack of RN /LPN service that patient has been allotted 12 hrs /day. Tiara stated they have placed ads for RN for their agency but unable to get one for several months , Patients mother has been providing all the support that's needed for patient at home but not successful in keeping patient out of hospital . Tiara states she will speak with Savannah regarding hiring RN from different agency or have patient go to a group home where he will get 24 hr care.        Anita Casillas RN  Inpatient Care Coordinator  [facility]  # [phone]

## 2021-11-17 NOTE — PROGRESS NOTES
Pt is not appropriate for skilled PT services at this time due to dependent at baseline for mobility.  Will defer to nursing for mobility.  Plan was discussed with nursing.  Will D/C current PT orders.  Please reorder if status changes. Thank you.    11/17/2021 by Teressa Keita

## 2021-11-17 NOTE — PLAN OF CARE
DATE & TIME: 11/17/2021 (5503-7419)  Cognitive Concerns/ Orientation : Patient is non-verbal. Can make hand signals; will nod or use thumbs up/thumbs down to indicate yes/no   BEHAVIOR & AGGRESSION TOOL COLOR: Green. Calm and cooperative  ABNL VS/O2: VSS on RA  MOBILITY: Total. Assist x2, lift. T/R  PAIN MANAGMENT: Denies pain  DIET: NPO, tube feeding   BOWEL/BLADDER: Incontinent of bowels and bladder, external catheter   ABNL LAB/BG: N/a   DRAIN/DEVICES: G tube  TELEMETRY RHYTHM: NA  SKIN: Tender in sacrum/coccyx area, no open areas  TESTS/PROCEDURES: NA  D/C DATE: Home today at 1600 with mom  OTHER IMPORTANT INFO: Mother is legal guardian. Contact precautions maintained MRSA, VRE.

## 2021-11-18 ENCOUNTER — PATIENT OUTREACH (OUTPATIENT)
Dept: CARE COORDINATION | Facility: CLINIC | Age: 61
End: 2021-11-18
Payer: MEDICARE

## 2021-11-18 DIAGNOSIS — Z71.89 OTHER SPECIFIED COUNSELING: ICD-10-CM

## 2021-11-18 LAB
BACTERIA BLD CULT: NO GROWTH
BACTERIA BLD CULT: NO GROWTH

## 2021-11-18 NOTE — PROGRESS NOTES
Clinic Care Coordination Contact  [facility]/Voicemail      Clinical Data: [facility]W Outreach    Outreach attempted x 1 regarding CCC enrollment.  Left message on patient's voicemail with call back information and requested return call.    Chart Review    Referral: IP Handoff (11/12/2021 - 11/17/2021)     Reason for Hospital Stay: Patient was hospitalized for recurrent aspiration pneumonia    Discharge Diagnoses: Recurrent aspiration pneumonia with severe sepsis     Discharge Instruction:   o Follow up with primary care provider, Carlos Gomez, within 7 days for hospital follow- up.   o No follow up labs or test are needed.    New Medication:   o  medications at [facility] [address] [facility]-    Referrals:   - Home Care PT Referral for Hospital Discharge  - Home Care OT Referral for Hospital Discharge  - Home care nursing referral    Plan: CHW will attempt another outreach to the patient via phone regarding enrollment into [facility] in 1-2 business days.    WALLY Wheeler  Connected Care Resource [facility]    Phone: [phone]  [email or web address]

## 2021-11-19 NOTE — PROGRESS NOTES
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 Found to have suspected sepsis due to recurrent bilateral aspiration pneumonia.    ASSESSMENT      Enrollment  Primary Care Care Coordination Status: Not a Candidate    Discharge Assessment  How are you doing now that you are home?: \"He discharged some acid but everything is good for now\"  How are your symptoms? (Red Flag symptoms escalate to triage hotline per guidelines): Improved  Do you feel your condition is stable enough to be safe at home until your provider visit?: Yes  Does the patient have their discharge instructions? : Yes  Does the patient have questions regarding their discharge instructions? : No  Were you started on any new medications or were there changes to any of your previous medications? : Yes  Does the patient have all of their medications?: Yes  Do you have questions regarding any of your medications? : No  Do you have all of your needed medical supplies or equipment (DME)?  (i.e. oxygen tank, CPAP, cane, etc.): Yes  Discharge follow-up appointment scheduled within 14 calendar days? : No  Is patient agreeable to assistance with scheduling? : No                  PLAN                                                  "     Outpatient Plan:  Follow-up and recommended labs and tests       Follow up with primary care provider, Carlos Gomez, within 7 days for   hospital follow- up.  No follow up labs or test are needed.       Future Appointments   Date Time Provider Department Center   1/5/2022  2:00 PM Faizan Mclean MD [facility]         For any urgent concerns, please contact our 24 hour nurse triage line: [phone] ([phone])         Sue Rodriguez  Community Health Worker  Connected [facility]  [phone]

## 2021-11-23 ENCOUNTER — HOSPITAL ENCOUNTER (EMERGENCY)
Facility: CLINIC | Age: 61
Discharge: HOME | End: 2021-11-24
Attending: EMERGENCY MEDICINE | Admitting: EMERGENCY MEDICINE
Payer: MEDICARE

## 2021-11-23 DIAGNOSIS — M62.81 GENERALIZED MUSCLE WEAKNESS: ICD-10-CM

## 2021-11-23 DIAGNOSIS — J69.0 ASPIRATION PNEUMONITIS (H): ICD-10-CM

## 2021-11-23 LAB
ATRIAL RATE - MUSE: 82 BPM
DIASTOLIC BLOOD PRESSURE - MUSE: NORMAL MMHG
INTERPRETATION ECG - MUSE: NORMAL
P AXIS - MUSE: 50 DEGREES
PR INTERVAL - MUSE: 142 MS
QRS DURATION - MUSE: 90 MS
QT - MUSE: 412 MS
QTC - MUSE: 481 MS
R AXIS - MUSE: -49 DEGREES
SYSTOLIC BLOOD PRESSURE - MUSE: NORMAL MMHG
T AXIS - MUSE: 35 DEGREES
VENTRICULAR RATE- MUSE: 82 BPM

## 2021-11-23 PROCEDURE — 99285 EMERGENCY DEPT VISIT HI MDM: CPT

## 2021-11-23 PROCEDURE — 93005 ELECTROCARDIOGRAM TRACING: CPT

## 2021-11-23 PROCEDURE — C9803 HOPD COVID-19 SPEC COLLECT: HCPCS

## 2021-11-24 ENCOUNTER — APPOINTMENT (OUTPATIENT)
Dept: GENERAL RADIOLOGY | Facility: CLINIC | Age: 61
End: 2021-11-24
Attending: EMERGENCY MEDICINE
Payer: MEDICARE

## 2021-11-24 VITALS
RESPIRATION RATE: 17 BRPM | SYSTOLIC BLOOD PRESSURE: 104 MMHG | HEART RATE: 85 BPM | TEMPERATURE: 96.5 F | OXYGEN SATURATION: 97 % | DIASTOLIC BLOOD PRESSURE: 79 MMHG

## 2021-11-24 LAB
ALBUMIN SERPL-MCNC: 3.3 G/DL (ref 3.4–5)
ALBUMIN UR-MCNC: 50 MG/DL
ALP SERPL-CCNC: 108 U/L (ref 40–150)
ALT SERPL W P-5'-P-CCNC: 34 U/L (ref 0–70)
ANION GAP SERPL CALCULATED.3IONS-SCNC: 3 MMOL/L (ref 3–14)
APPEARANCE UR: CLEAR
AST SERPL W P-5'-P-CCNC: 26 U/L (ref 0–45)
BASOPHILS # BLD AUTO: 0.1 10E3/UL (ref 0–0.2)
BASOPHILS NFR BLD AUTO: 1 %
BILIRUB SERPL-MCNC: 0.4 MG/DL (ref 0.2–1.3)
BILIRUB UR QL STRIP: NEGATIVE
BUN SERPL-MCNC: 14 MG/DL (ref 7–30)
CALCIUM SERPL-MCNC: 8.7 MG/DL (ref 8.5–10.1)
CHLORIDE BLD-SCNC: 105 MMOL/L (ref 94–109)
CO2 SERPL-SCNC: 32 MMOL/L (ref 20–32)
COLOR UR AUTO: YELLOW
CREAT SERPL-MCNC: 0.96 MG/DL (ref 0.66–1.25)
EOSINOPHIL # BLD AUTO: 0.7 10E3/UL (ref 0–0.7)
EOSINOPHIL NFR BLD AUTO: 8 %
ERYTHROCYTE [DISTWIDTH] IN BLOOD BY AUTOMATED COUNT: 13.3 % (ref 10–15)
FLUAV RNA SPEC QL NAA+PROBE: NEGATIVE
FLUBV RNA RESP QL NAA+PROBE: NEGATIVE
GFR SERPL CREATININE-BSD FRML MDRD: 86 ML/MIN/1.73M2
GLUCOSE BLD-MCNC: 117 MG/DL (ref 70–99)
GLUCOSE UR STRIP-MCNC: NEGATIVE MG/DL
HCT VFR BLD AUTO: 46.7 % (ref 40–53)
HGB BLD-MCNC: 15.1 G/DL (ref 13.3–17.7)
HGB UR QL STRIP: NEGATIVE
IMM GRANULOCYTES # BLD: 0 10E3/UL
IMM GRANULOCYTES NFR BLD: 0 %
KETONES UR STRIP-MCNC: NEGATIVE MG/DL
LACTATE SERPL-SCNC: 1.8 MMOL/L (ref 0.7–2)
LEUKOCYTE ESTERASE UR QL STRIP: NEGATIVE
LIPASE SERPL-CCNC: 312 U/L (ref 73–393)
LYMPHOCYTES # BLD AUTO: 3.9 10E3/UL (ref 0.8–5.3)
LYMPHOCYTES NFR BLD AUTO: 43 %
MCH RBC QN AUTO: 29.9 PG (ref 26.5–33)
MCHC RBC AUTO-ENTMCNC: 32.3 G/DL (ref 31.5–36.5)
MCV RBC AUTO: 93 FL (ref 78–100)
MONOCYTES # BLD AUTO: 0.7 10E3/UL (ref 0–1.3)
MONOCYTES NFR BLD AUTO: 7 %
MUCOUS THREADS #/AREA URNS LPF: PRESENT /LPF
NEUTROPHILS # BLD AUTO: 3.8 10E3/UL (ref 1.6–8.3)
NEUTROPHILS NFR BLD AUTO: 41 %
NITRATE UR QL: NEGATIVE
NRBC # BLD AUTO: 0 10E3/UL
NRBC BLD AUTO-RTO: 0 /100
PH UR STRIP: 7.5 [PH] (ref 5–7)
PLATELET # BLD AUTO: 162 10E3/UL (ref 150–450)
POTASSIUM BLD-SCNC: 3.8 MMOL/L (ref 3.4–5.3)
PROCALCITONIN SERPL-MCNC: <0.05 NG/ML
PROT SERPL-MCNC: 8.1 G/DL (ref 6.8–8.8)
RBC # BLD AUTO: 5.05 10E6/UL (ref 4.4–5.9)
RBC URINE: 0 /HPF
SARS-COV-2 RNA RESP QL NAA+PROBE: NEGATIVE
SODIUM SERPL-SCNC: 140 MMOL/L (ref 133–144)
SP GR UR STRIP: 1.03 (ref 1–1.03)
SQUAMOUS EPITHELIAL: 1 /HPF
UROBILINOGEN UR STRIP-MCNC: 4 MG/DL
WBC # BLD AUTO: 9.2 10E3/UL (ref 4–11)
WBC URINE: 5 /HPF
YEAST #/AREA URNS HPF: ABNORMAL /HPF

## 2021-11-24 PROCEDURE — 81001 URINALYSIS AUTO W/SCOPE: CPT | Performed by: EMERGENCY MEDICINE

## 2021-11-24 PROCEDURE — 36415 COLL VENOUS BLD VENIPUNCTURE: CPT | Performed by: EMERGENCY MEDICINE

## 2021-11-24 PROCEDURE — 87040 BLOOD CULTURE FOR BACTERIA: CPT | Performed by: EMERGENCY MEDICINE

## 2021-11-24 PROCEDURE — 84145 PROCALCITONIN (PCT): CPT | Performed by: EMERGENCY MEDICINE

## 2021-11-24 PROCEDURE — 83605 ASSAY OF LACTIC ACID: CPT | Performed by: EMERGENCY MEDICINE

## 2021-11-24 PROCEDURE — 85025 COMPLETE CBC W/AUTO DIFF WBC: CPT | Performed by: EMERGENCY MEDICINE

## 2021-11-24 PROCEDURE — 83690 ASSAY OF LIPASE: CPT | Performed by: EMERGENCY MEDICINE

## 2021-11-24 PROCEDURE — 87636 SARSCOV2 & INF A&B AMP PRB: CPT | Performed by: EMERGENCY MEDICINE

## 2021-11-24 PROCEDURE — 71045 X-RAY EXAM CHEST 1 VIEW: CPT

## 2021-11-24 PROCEDURE — 82040 ASSAY OF SERUM ALBUMIN: CPT | Performed by: EMERGENCY MEDICINE

## 2021-11-24 NOTE — ED PROVIDER NOTES
History     Chief Complaint:    Altered Mental Status (Pt. brought from home with 3 day history of somulence per mother, QQ=754. Mother states pt. appears this way when having low sodium.)      HPI   Keyon Farias is a 61 year old male who presents with weakness from home.  He is quadriplegic and non verbal.  History is gathered from mother on the phone and then in person.  She notes he hasn't had as much strength and seems more tired and sleepy.  She is concerned that this might be related to his sodium and wants it checked.  He is being fed with a GJ tube and isn't eating or drinking on top of this.  He has had recurrent episodes of aspiration pneumonia.  He has not had a fever, cough.  He also has an indwelling catheter.  He hasn't had trauma or injuries.    Review of Systems  + fatigue and weakness  - fever, cough, vomiting, diarrhea, trauma  All other systems negative, but limited due to mental status changes    Allergies:      Valproic Acid  Dilantin [Phenytoin Sodium]  Scopolamine      Medications:      acetaminophen (TYLENOL 8 HOUR) 650 MG CR tablet  acetylcysteine (MUCOMYST) 20 % neb solution  albuterol (PROVENTIL) (5 MG/ML) 0.5% neb solution  aspirin (ASA) 81 MG chewable tablet  bacitracin ointment  Brivaracetam (BRIVIACT) 10 MG/ML solution  calcium carbonate 1250 MG/5ML SUSP suspension  carBAMazepine (TEGRETOL) 100 MG/5ML suspension  carBAMazepine (TEGRETOL) 100 MG/5ML suspension  clotrimazole-betamethasone (LOTRISONE) 1-0.05 % external cream  hydrocortisone (CORTEF) 5 MG tablet  hydrocortisone 1 % CREA cream  levothyroxine (SYNTHROID/LEVOTHROID) 150 MCG tablet  metoclopramide (REGLAN) 10 MG/10ML SOLN solution  multivitamin, therapeutic (THERA-VIT) TABS tablet  mupirocin (BACTROBAN) 2 % external ointment  pantoprazole (PROTONIX) 2 mg/mL SUSP suspension  potassium & sodium phosphates (NEUTRA-PHOS) 280-160-250 MG Packet  Scopolamine HBr POWD  Skin Protectants, Misc. 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)      DVT of upper extremity (deep vein thrombosis) (H)      Gastro-oesophageal reflux disease      Panhypopituitarism (H)      Pneumonia      Seizures (H)      Sepsis due to urinary tract infection (H) 01/15/2021     Septic shock (H)      Spastic hemiplegia affecting dominant side (H)      Thyroid disease      Tracheostomy care (H)      Traumatic brain injury (H) 1989     Unspecified cerebral artery occlusion with cerebral infarction 1989     UTI (urinary tract infection)      Ventricular fibrillation (H)      Ventricular tachyarrhythmia (H)      Patient Active Problem List    Diagnosis Date Noted     Severe sepsis (H) 11/13/2021     Priority: Medium     TBI (traumatic brain injury) (H) 05/24/2021     Priority: Medium     Dysphagia related to TBI -- S/P G-tube 05/24/2021     Priority: Medium     Conjunctivitis of right eye, unspecified conjunctivitis type 02/22/2021     Priority: Medium     Contracture of hand joint, right 02/03/2021     Priority: Medium     Fever and chills 01/15/2021     Priority: Medium     Urinary tract infection without hematuria, site unspecified 01/15/2021     Priority: Medium     Sepsis due to urinary tract infection (H) 01/15/2021     Priority: Medium     Transient alteration of awareness 12/18/2020     Priority: Medium     History of seizure 12/18/2020     Priority: Medium     Aspiration pneumonitis (H) 11/11/2019     Priority: Medium     Aspiration, chronic pulmonary, initial encounter 10/07/2019     Priority: Medium     Acid reflux 04/02/2019     Priority: Medium     Aspiration pneumonia of right lower lobe due to gastric secretions (H) 05/01/2018     Priority: Medium     Fever  12/26/2017     Priority: Medium     Encephalopathy 06/06/2017     Priority: Medium     Pneumonia, aspiration (H) 06/05/2017     Priority: Medium     Necrotizing pancreatitis 01/23/2017     Priority: Medium     Cardiac arrest (H) 11/26/2016     Priority: Medium     Acute respiratory failure with hypoxia (H) 11/26/2016     Priority: Medium     Pneumonia 11/23/2016     Priority: Medium     Hyponatremia 09/09/2016     Priority: Medium     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) 08/23/2016     Priority: Medium     Pneumonia of both lower lobes due to infectious organism 01/06/2016     Priority: Medium     Community acquired pneumonia 01/06/2016     Priority: Medium     Recurrent seizures (H) 03/08/2015     Priority: Medium     Hematemesis 03/13/2014     Priority: Medium     Appendicitis 07/30/2013     Priority: Medium     Panhypopituitarism (H)      Priority: Medium        Past Surgical History:        Past Surgical History:   Procedure Laterality Date     ENDOSCOPIC ULTRASOUND UPPER GASTROINTESTINAL TRACT (GI) N/A 1/30/2017    Procedure: ENDOSCOPIC ULTRASOUND, ESOPHAGOSCOPY / UPPER GASTROINTESTINAL TRACT (GI);  Surgeon: Jus Montana MD;  Location: [facility] OR     ENDOSCOPIC ULTRASOUND, ESOPHAGOSCOPY, GASTROSCOPY, DUODENOSCOPY (EGD), NECROSECTOMY N/A 2/7/2017    Procedure: ENDOSCOPIC ULTRASOUND, ESOPHAGOSCOPY, GASTROSCOPY, DUODENOSCOPY (EGD), NECROSECTOMY;  Surgeon: Jack Marcus MD;  Location:  OR     ESOPHAGOSCOPY, GASTROSCOPY, DUODENOSCOPY (EGD), COMBINED  3/13/2014    Procedure: COMBINED ESOPHAGOSCOPY, GASTROSCOPY, DUODENOSCOPY (EGD), BIOPSY SINGLE OR MULTIPLE;  gastroscopy;  Surgeon: Digna Rhodes MD;  Location: [facility]     ESOPHAGOSCOPY, GASTROSCOPY, DUODENOSCOPY (EGD), COMBINED N/A 12/6/2016    Procedure: COMBINED ESOPHAGOSCOPY, GASTROSCOPY, DUODENOSCOPY (EGD);  Surgeon: Digna Rhodes MD;  Location: [facility]     ESOPHAGOSCOPY, GASTROSCOPY,  DUODENOSCOPY (EGD), COMBINED N/A 2/7/2017    Procedure: COMBINED ENDOSCOPIC ULTRASOUND, ESOPHAGOSCOPY, GASTROSCOPY, DUODENOSCOPY (EGD), FINE NEEDLE ASPIRATE/BIOPSY;  Surgeon: Too Thakur MD;  Location:  OR     HEAD & NECK SURGERY      reconstructive facial surgery following accident in 1989     IR FOLLOW UP VISIT INPATIENT  2/20/2019     IR GASTRO JEJUNOSTOMY TUBE CHANGE  12/20/2018     IR GASTRO JEJUNOSTOMY TUBE CHANGE  2/4/2019     IR GASTRO JEJUNOSTOMY TUBE CHANGE  3/8/2019     IR GASTRO JEJUNOSTOMY TUBE CHANGE  8/7/2019     IR GASTRO JEJUNOSTOMY TUBE CHANGE  1/13/2020     IR GASTRO JEJUNOSTOMY TUBE CHANGE  1/30/2020     IR GASTRO JEJUNOSTOMY TUBE CHANGE  6/24/2020     IR GASTRO JEJUNOSTOMY TUBE CHANGE  9/17/2020     IR GASTRO JEJUNOSTOMY TUBE CHANGE  10/14/2020     IR GASTRO JEJUNOSTOMY TUBE CHANGE  2/16/2021     IR GASTRO JEJUNOSTOMY TUBE CHANGE  5/6/2021     IR GASTRO JEJUNOSTOMY TUBE CHANGE  5/25/2021     IR GASTRO JEJUNOSTOMY TUBE CHANGE  7/26/2021     IR GASTRO JEJUNOSTOMY TUBE CHANGE  9/29/2021     IR GASTRO JEJUNOSTOMY TUBE CHANGE  11/16/2021     IR PICC EXCHANGE LEFT  8/15/2019     LAPAROSCOPIC APPENDECTOMY  7/30/2013    Procedure: LAPAROSCOPIC APPENDECTOMY;  LAPAROSCOPIC APPENDECTOMY;  Surgeon: Manish Pierce MD;  Location:  OR     LAPAROSCOPIC ASSISTED INSERTION TUBE GASTROTOMY N/A 9/7/2016    Procedure: LAPAROSCOPIC ASSISTED INSERTION TUBE GASTROSTOMY;  Surgeon: Manish Pierce MD;  Location:  OR     ORTHOPEDIC SURGERY      right hand repair     TRACHEOSTOMY N/A 9/3/2016    Procedure: TRACHEOSTOMY;  Surgeon: João Ortiz MD;  Location:  OR     TRACHEOSTOMY N/A 12/2/2016    Procedure: TRACHEOSTOMY;  Surgeon: João Ortiz MD;  Location:  OR     VASCULAR SURGERY         Family History:        Family History   Problem Relation Age of Onset     Cancer Father        Social History:  Lives at home with mother    Physical Exam     Patient Vitals for the past  24 hrs:   BP Pulse Resp SpO2   11/24/21 0245 104/79 85 17 97 %       Physical Exam  GENERAL: well developed, pleasant  HEAD: atraumatic  EYES: pupils reactive, extraocular muscles intact, conjunctivae normal  ENT:  mucus membranes moist  NECK:  trachea midline, normal range of motion  RESPIRATORY: no tachypnea, breath sounds clear to auscultation   CVS: normal S1/S2, no murmurs, intact distal pulses  ABDOMEN: soft, nontender, nondistention, GJ tube without drainage or signs of infection at skin site  MUSCULOSKELETAL: no deformities  SKIN: warm and dry, no acute rashes or ulceration  NEURO: GCS 15, cranial nerves intact, alert, looks around the room, more animated once Mother arrives, paralysis to upper and lower extremities  PSYCH:  Looks happier once mother arrives        Emergency Department Course   ECG:  ECG taken at 2310, ECG read at 2320  Normal sinus, left anterior fascicular block, prolonged QT     Rate 82 bpm. WV interval 142 ms. QRS duration 90 ms. QT/QTc 412/481 ms.     Imaging:  XR Chest Port 1 View   Final Result   IMPRESSION: Heart size is normal. Subtle narrowing of the mid thoracic tracheal air column, as seen on prior exams. Left lung is clear. Elevated right hemidiaphragm. Patchy right mid and lower lung zone airspace infiltrate consistent with pneumonia. No    visible pneumothorax.          Laboratory:  Labs Ordered and Resulted from Time of ED Arrival to Time of ED Departure   COMPREHENSIVE METABOLIC PANEL - Abnormal       Result Value    Sodium 140      Potassium 3.8      Chloride 105      Carbon Dioxide (CO2) 32      Anion Gap 3      Urea Nitrogen 14      Creatinine 0.96      Calcium 8.7      Glucose 117 (*)     Alkaline Phosphatase 108      AST 26      ALT 34      Protein Total 8.1      Albumin 3.3 (*)     Bilirubin Total 0.4      GFR Estimate 86     ROUTINE UA WITH MICROSCOPIC REFLEX TO CULTURE - Abnormal    Color Urine Yellow      Appearance Urine Clear      Glucose Urine Negative       Bilirubin Urine Negative      Ketones Urine Negative      Specific Gravity Urine 1.028      Blood Urine Negative      pH Urine 7.5 (*)     Protein Albumin Urine 50  (*)     Urobilinogen Urine 4.0 (*)     Nitrite Urine Negative      Leukocyte Esterase Urine Negative      Budding Yeast Urine Many (*)     Mucus Urine Present (*)     RBC Urine 0      WBC Urine 5      Squamous Epithelials Urine 1     LIPASE - Normal    Lipase 312     LACTIC ACID WHOLE BLOOD - Normal    Lactic Acid 1.8     PROCALCITONIN - Normal    Procalcitonin <0.05     INFLUENZA A/B & SARS-COV2 PCR MULTIPLEX - Normal    Influenza A PCR Negative      Influenza B PCR Negative      SARS CoV2 PCR Negative     CBC WITH PLATELETS AND DIFFERENTIAL    WBC Count 9.2      RBC Count 5.05      Hemoglobin 15.1      Hematocrit 46.7      MCV 93      MCH 29.9      MCHC 32.3      RDW 13.3      Platelet Count 162      % Neutrophils 41      % Lymphocytes 43      % Monocytes 7      % Eosinophils 8      % Basophils 1      % Immature Granulocytes 0      NRBCs per 100 WBC 0      Absolute Neutrophils 3.8      Absolute Lymphocytes 3.9      Absolute Monocytes 0.7      Absolute Eosinophils 0.7      Absolute Basophils 0.1      Absolute Immature Granulocytes 0.0      Absolute NRBCs 0.0         Procedures:      Emergency Department Course:    Reviewed:    I reviewed nursing notes, vitals and past history    Assessments:  I obtained history and examined the patient as noted above.    I rechecked the patient and explained findings.       Consults:            Interventions:    Medications - No data to display    Disposition:  The patient was discharged to home.    Impression & Plan            CMS Diagnoses:        Medical Decision Making:    Patient presents with tiredness and sleeping more.  He is at home with his mother and she is a caregiver for him and has an indwelling catheter as well as GJ tube.  He is well-known to the staff and hospital for recurrent aspiration as well as  urinary tract infections.  Mother was contacted and she notes that he seems more sleepy than normal.  She came in to be with him and he is quite awake and alert looking to be seemingly at baseline.  Urinalysis is negative.  Chest x-ray shows concerns for aspiration although looking through multiple chest x-rays he looks to have chronic aspiration.  Procalcitonin is normal.  His white count is normal.  His oxygen is normal at 97 to 98%.  I did reach out to the hospitalist who knows him well and notes that he is a chronic aspirator and difficult to say if this is causing his symptoms.  Discussed going home and mother is comfortable with this.  Discussed starting antibiotics but she is comfortable waiting given that all of his numbers and pulse ox and labs are normal.  Patient will return if he has any worsening symptoms fevers.  Here has been quite lucid and engaging with his mother.  She feels that possibly part of the problem is change in his sleep-wake cycle.  Critical Care time:  none    Covid-19  Keyon Farias was evaluated during a global COVID-19 pandemic, which necessitated consideration that the patient might be at risk for infection with the SARS-CoV-2 virus that causes COVID-19.   Applicable protocols for evaluation were followed during the patient's care.   COVID-19 was considered as part of the patient's evaluation. The plan for testing is:  a test was obtained during this visit.    Diagnosis:    ICD-10-CM    1. Generalized muscle weakness  M62.81    2. Aspiration pneumonitis (H)  J69.0        Discharge Medications:  Discharge Medication List as of 11/24/2021  2:42 AM            Scribe Disclosure:  Randy DYKES MD, am serving as a scribe at 1:39 AM on 11/24/2021 to document services personally performed by Randy Avila MD based on my observations and the provider's statements to me.      Randy Avila MD  11/25/21 0015

## 2021-11-24 NOTE — ED NOTES
Bed: ED14  Expected date:   Expected time:   Means of arrival:   Comments:  Kerri 1 61M somnolent, not rousable eta [id number]

## 2021-11-24 NOTE — ED TRIAGE NOTES
Pt. brought from home with 3 day history of somulence per mother, AR=840. Mother states pt. appears this way when having low sodium.

## 2021-11-29 LAB
BACTERIA BLD CULT: NO GROWTH
BACTERIA BLD CULT: NO GROWTH

## 2021-12-05 ENCOUNTER — APPOINTMENT (OUTPATIENT)
Dept: GENERAL RADIOLOGY | Facility: CLINIC | Age: 61
DRG: 871 | End: 2021-12-05
Attending: EMERGENCY MEDICINE
Payer: MEDICARE

## 2021-12-05 ENCOUNTER — HOSPITAL ENCOUNTER (INPATIENT)
Facility: CLINIC | Age: 61
LOS: 7 days | Discharge: HOME HEALTH | DRG: 871 | End: 2021-12-13
Attending: EMERGENCY MEDICINE | Admitting: INTERNAL MEDICINE
Payer: MEDICARE

## 2021-12-05 DIAGNOSIS — K66.8 FREE INTRAPERITONEAL AIR: ICD-10-CM

## 2021-12-05 DIAGNOSIS — A41.9 SEPTIC SHOCK (H): ICD-10-CM

## 2021-12-05 DIAGNOSIS — N39.0 URINARY TRACT INFECTION WITHOUT HEMATURIA, SITE UNSPECIFIED: ICD-10-CM

## 2021-12-05 DIAGNOSIS — A41.9 SEVERE SEPSIS (H): Primary | ICD-10-CM

## 2021-12-05 DIAGNOSIS — J96.01 ACUTE RESPIRATORY FAILURE WITH HYPOXIA (H): ICD-10-CM

## 2021-12-05 DIAGNOSIS — R65.20 SEVERE SEPSIS (H): Primary | ICD-10-CM

## 2021-12-05 DIAGNOSIS — R65.21 SEPTIC SHOCK (H): ICD-10-CM

## 2021-12-05 DIAGNOSIS — J69.0 ASPIRATION PNEUMONITIS (H): ICD-10-CM

## 2021-12-05 LAB
ALBUMIN SERPL-MCNC: 3.2 G/DL (ref 3.4–5)
ALBUMIN UR-MCNC: 30 MG/DL
ALP SERPL-CCNC: 109 U/L (ref 40–150)
ALT SERPL W P-5'-P-CCNC: 24 U/L (ref 0–70)
ANION GAP SERPL CALCULATED.3IONS-SCNC: 6 MMOL/L (ref 3–14)
APPEARANCE UR: CLEAR
APTT PPP: 41 SECONDS (ref 22–38)
AST SERPL W P-5'-P-CCNC: 22 U/L (ref 0–45)
BASOPHILS # BLD AUTO: 0.1 10E3/UL (ref 0–0.2)
BASOPHILS NFR BLD AUTO: 1 %
BILIRUB SERPL-MCNC: 0.7 MG/DL (ref 0.2–1.3)
BILIRUB UR QL STRIP: NEGATIVE
BUN SERPL-MCNC: 16 MG/DL (ref 7–30)
CALCIUM SERPL-MCNC: 8.8 MG/DL (ref 8.5–10.1)
CHLORIDE BLD-SCNC: 106 MMOL/L (ref 94–109)
CO2 SERPL-SCNC: 27 MMOL/L (ref 20–32)
COLOR UR AUTO: YELLOW
CREAT SERPL-MCNC: 1.24 MG/DL (ref 0.66–1.25)
D DIMER PPP FEU-MCNC: 1.9 UG/ML FEU (ref 0–0.5)
EOSINOPHIL # BLD AUTO: 0.2 10E3/UL (ref 0–0.7)
EOSINOPHIL NFR BLD AUTO: 2 %
ERYTHROCYTE [DISTWIDTH] IN BLOOD BY AUTOMATED COUNT: 13.9 % (ref 10–15)
FLUAV RNA SPEC QL NAA+PROBE: NEGATIVE
FLUBV RNA RESP QL NAA+PROBE: NEGATIVE
GFR SERPL CREATININE-BSD FRML MDRD: 63 ML/MIN/1.73M2
GLUCOSE BLD-MCNC: 122 MG/DL (ref 70–99)
GLUCOSE BLDC GLUCOMTR-MCNC: 122 MG/DL (ref 70–99)
GLUCOSE UR STRIP-MCNC: NEGATIVE MG/DL
HCT VFR BLD AUTO: 49.3 % (ref 40–53)
HGB BLD-MCNC: 15.7 G/DL (ref 13.3–17.7)
HGB UR QL STRIP: NEGATIVE
HYALINE CASTS: 10 /LPF
IMM GRANULOCYTES # BLD: 0 10E3/UL
IMM GRANULOCYTES NFR BLD: 0 %
INR PPP: 1.28 (ref 0.85–1.15)
KETONES UR STRIP-MCNC: NEGATIVE MG/DL
LEUKOCYTE ESTERASE UR QL STRIP: NEGATIVE
LIPASE SERPL-CCNC: 529 U/L (ref 73–393)
LYMPHOCYTES # BLD AUTO: 2.9 10E3/UL (ref 0.8–5.3)
LYMPHOCYTES NFR BLD AUTO: 24 %
MCH RBC QN AUTO: 29.7 PG (ref 26.5–33)
MCHC RBC AUTO-ENTMCNC: 31.8 G/DL (ref 31.5–36.5)
MCV RBC AUTO: 93 FL (ref 78–100)
MONOCYTES # BLD AUTO: 0.6 10E3/UL (ref 0–1.3)
MONOCYTES NFR BLD AUTO: 5 %
MUCOUS THREADS #/AREA URNS LPF: PRESENT /LPF
NEUTROPHILS # BLD AUTO: 8.1 10E3/UL (ref 1.6–8.3)
NEUTROPHILS NFR BLD AUTO: 68 %
NITRATE UR QL: NEGATIVE
NRBC # BLD AUTO: 0 10E3/UL
NRBC BLD AUTO-RTO: 0 /100
PH UR STRIP: 6.5 [PH] (ref 5–7)
PLATELET # BLD AUTO: 187 10E3/UL (ref 150–450)
POTASSIUM BLD-SCNC: 4.5 MMOL/L (ref 3.4–5.3)
PROT SERPL-MCNC: 8 G/DL (ref 6.8–8.8)
RBC # BLD AUTO: 5.28 10E6/UL (ref 4.4–5.9)
RBC URINE: 0 /HPF
SARS-COV-2 RNA RESP QL NAA+PROBE: NEGATIVE
SODIUM SERPL-SCNC: 139 MMOL/L (ref 133–144)
SP GR UR STRIP: 1.03 (ref 1–1.03)
TROPONIN I SERPL HS-MCNC: 9 NG/L
UROBILINOGEN UR STRIP-MCNC: 4 MG/DL
WBC # BLD AUTO: 11.9 10E3/UL (ref 4–11)
WBC URINE: 2 /HPF

## 2021-12-05 PROCEDURE — 82803 BLOOD GASES ANY COMBINATION: CPT

## 2021-12-05 PROCEDURE — 87086 URINE CULTURE/COLONY COUNT: CPT | Performed by: EMERGENCY MEDICINE

## 2021-12-05 PROCEDURE — 81001 URINALYSIS AUTO W/SCOPE: CPT | Performed by: EMERGENCY MEDICINE

## 2021-12-05 PROCEDURE — 250N000011 HC RX IP 250 OP 636: Performed by: EMERGENCY MEDICINE

## 2021-12-05 PROCEDURE — 85025 COMPLETE CBC W/AUTO DIFF WBC: CPT | Performed by: EMERGENCY MEDICINE

## 2021-12-05 PROCEDURE — 258N000003 HC RX IP 258 OP 636: Performed by: EMERGENCY MEDICINE

## 2021-12-05 PROCEDURE — 85730 THROMBOPLASTIN TIME PARTIAL: CPT | Performed by: EMERGENCY MEDICINE

## 2021-12-05 PROCEDURE — 51702 INSERT TEMP BLADDER CATH: CPT

## 2021-12-05 PROCEDURE — 87040 BLOOD CULTURE FOR BACTERIA: CPT | Performed by: EMERGENCY MEDICINE

## 2021-12-05 PROCEDURE — 96365 THER/PROPH/DIAG IV INF INIT: CPT | Mod: 59

## 2021-12-05 PROCEDURE — 83690 ASSAY OF LIPASE: CPT | Performed by: EMERGENCY MEDICINE

## 2021-12-05 PROCEDURE — 999N000157 HC STATISTIC RCP TIME EA 10 MIN

## 2021-12-05 PROCEDURE — 250N000013 HC RX MED GY IP 250 OP 250 PS 637: Performed by: EMERGENCY MEDICINE

## 2021-12-05 PROCEDURE — 96360 HYDRATION IV INFUSION INIT: CPT | Mod: 59

## 2021-12-05 PROCEDURE — 84145 PROCALCITONIN (PCT): CPT | Performed by: INTERNAL MEDICINE

## 2021-12-05 PROCEDURE — 5A09457 ASSISTANCE WITH RESPIRATORY VENTILATION, 24-96 CONSECUTIVE HOURS, CONTINUOUS POSITIVE AIRWAY PRESSURE: ICD-10-PCS | Performed by: EMERGENCY MEDICINE

## 2021-12-05 PROCEDURE — 94660 CPAP INITIATION&MGMT: CPT

## 2021-12-05 PROCEDURE — C9803 HOPD COVID-19 SPEC COLLECT: HCPCS

## 2021-12-05 PROCEDURE — 87636 SARSCOV2 & INF A&B AMP PRB: CPT | Performed by: EMERGENCY MEDICINE

## 2021-12-05 PROCEDURE — 85379 FIBRIN DEGRADATION QUANT: CPT | Performed by: EMERGENCY MEDICINE

## 2021-12-05 PROCEDURE — 93005 ELECTROCARDIOGRAM TRACING: CPT

## 2021-12-05 PROCEDURE — 80053 COMPREHEN METABOLIC PANEL: CPT | Performed by: EMERGENCY MEDICINE

## 2021-12-05 PROCEDURE — 99285 EMERGENCY DEPT VISIT HI MDM: CPT | Mod: 25

## 2021-12-05 PROCEDURE — 96366 THER/PROPH/DIAG IV INF ADDON: CPT

## 2021-12-05 PROCEDURE — 85610 PROTHROMBIN TIME: CPT | Performed by: EMERGENCY MEDICINE

## 2021-12-05 PROCEDURE — 84484 ASSAY OF TROPONIN QUANT: CPT | Performed by: EMERGENCY MEDICINE

## 2021-12-05 PROCEDURE — 36415 COLL VENOUS BLD VENIPUNCTURE: CPT | Performed by: EMERGENCY MEDICINE

## 2021-12-05 RX ORDER — ACETAMINOPHEN 650 MG/1
650 SUPPOSITORY RECTAL
Status: DISCONTINUED | OUTPATIENT
Start: 2021-12-05 | End: 2021-12-05

## 2021-12-05 RX ORDER — PIPERACILLIN SODIUM, TAZOBACTAM SODIUM 4; .5 G/20ML; G/20ML
4.5 INJECTION, POWDER, LYOPHILIZED, FOR SOLUTION INTRAVENOUS ONCE
Status: COMPLETED | OUTPATIENT
Start: 2021-12-05 | End: 2021-12-06

## 2021-12-05 RX ORDER — IOPAMIDOL 755 MG/ML
80 INJECTION, SOLUTION INTRAVASCULAR ONCE
Status: COMPLETED | OUTPATIENT
Start: 2021-12-05 | End: 2021-12-06

## 2021-12-05 RX ORDER — IBUPROFEN 100 MG/5ML
600 SUSPENSION, ORAL (FINAL DOSE FORM) ORAL ONCE
Status: COMPLETED | OUTPATIENT
Start: 2021-12-05 | End: 2021-12-05

## 2021-12-05 RX ORDER — PIPERACILLIN SODIUM, TAZOBACTAM SODIUM 3; .375 G/15ML; G/15ML
3.38 INJECTION, POWDER, LYOPHILIZED, FOR SOLUTION INTRAVENOUS EVERY 6 HOURS
Status: DISCONTINUED | OUTPATIENT
Start: 2021-12-06 | End: 2021-12-09

## 2021-12-05 RX ORDER — ACETAMINOPHEN 325 MG/10.15ML
650 LIQUID ORAL
Status: COMPLETED | OUTPATIENT
Start: 2021-12-05 | End: 2021-12-05

## 2021-12-05 RX ORDER — CEFAZOLIN SODIUM 1 G/50ML
1750 SOLUTION INTRAVENOUS ONCE
Status: COMPLETED | OUTPATIENT
Start: 2021-12-05 | End: 2021-12-06

## 2021-12-05 RX ADMIN — SODIUM CHLORIDE, POTASSIUM CHLORIDE, SODIUM LACTATE AND CALCIUM CHLORIDE 2208 ML: 600; 310; 30; 20 INJECTION, SOLUTION INTRAVENOUS at 22:37

## 2021-12-05 RX ADMIN — HYDROCORTISONE SODIUM SUCCINATE 100 MG: 100 INJECTION, POWDER, FOR SOLUTION INTRAMUSCULAR; INTRAVENOUS at 22:34

## 2021-12-05 RX ADMIN — IBUPROFEN 600 MG: 200 SUSPENSION ORAL at 22:42

## 2021-12-05 RX ADMIN — ACETAMINOPHEN 650 MG: 325 SUSPENSION ORAL at 22:53

## 2021-12-05 RX ADMIN — VANCOMYCIN HYDROCHLORIDE 1750 MG: 5 INJECTION, POWDER, LYOPHILIZED, FOR SOLUTION INTRAVENOUS at 22:45

## 2021-12-05 RX ADMIN — PIPERACILLIN AND TAZOBACTAM 4.5 G: 4; .5 INJECTION, POWDER, FOR SOLUTION INTRAVENOUS at 22:30

## 2021-12-06 ENCOUNTER — APPOINTMENT (OUTPATIENT)
Dept: CT IMAGING | Facility: CLINIC | Age: 61
DRG: 871 | End: 2021-12-06
Attending: EMERGENCY MEDICINE
Payer: MEDICARE

## 2021-12-06 PROBLEM — K66.8 FREE INTRAPERITONEAL AIR: Status: ACTIVE | Noted: 2021-12-06

## 2021-12-06 LAB
ATRIAL RATE - MUSE: 121 BPM
DIASTOLIC BLOOD PRESSURE - MUSE: NORMAL MMHG
HCO3 BLDV-SCNC: 28 MMOL/L (ref 21–28)
INTERPRETATION ECG - MUSE: NORMAL
LACTATE BLD-SCNC: 3.4 MMOL/L
LACTATE SERPL-SCNC: 3.8 MMOL/L (ref 0.7–2)
LACTATE SERPL-SCNC: 4.4 MMOL/L (ref 0.7–2)
P AXIS - MUSE: 101 DEGREES
PCO2 BLDV: 48 MM HG (ref 40–50)
PH BLDV: 7.38 [PH] (ref 7.32–7.43)
PO2 BLDV: 29 MM HG (ref 25–47)
PR INTERVAL - MUSE: 160 MS
PROCALCITONIN SERPL-MCNC: 0.21 NG/ML
QRS DURATION - MUSE: 76 MS
QT - MUSE: 308 MS
QTC - MUSE: 437 MS
R AXIS - MUSE: -47 DEGREES
SAO2 % BLDV: 52 % (ref 94–100)
SYSTOLIC BLOOD PRESSURE - MUSE: NORMAL MMHG
T AXIS - MUSE: 96 DEGREES
VENTRICULAR RATE- MUSE: 121 BPM

## 2021-12-06 PROCEDURE — 94660 CPAP INITIATION&MGMT: CPT

## 2021-12-06 PROCEDURE — 258N000003 HC RX IP 258 OP 636: Performed by: INTERNAL MEDICINE

## 2021-12-06 PROCEDURE — 99207 PR NON-BILLABLE SERV PER CHARTING: CPT | Performed by: INTERNAL MEDICINE

## 2021-12-06 PROCEDURE — 250N000013 HC RX MED GY IP 250 OP 250 PS 637: Performed by: EMERGENCY MEDICINE

## 2021-12-06 PROCEDURE — 250N000013 HC RX MED GY IP 250 OP 250 PS 637: Performed by: INTERNAL MEDICINE

## 2021-12-06 PROCEDURE — 71275 CT ANGIOGRAPHY CHEST: CPT | Mod: ME

## 2021-12-06 PROCEDURE — 96361 HYDRATE IV INFUSION ADD-ON: CPT

## 2021-12-06 PROCEDURE — 250N000011 HC RX IP 250 OP 636: Performed by: EMERGENCY MEDICINE

## 2021-12-06 PROCEDURE — 83605 ASSAY OF LACTIC ACID: CPT | Performed by: EMERGENCY MEDICINE

## 2021-12-06 PROCEDURE — 250N000011 HC RX IP 250 OP 636: Performed by: INTERNAL MEDICINE

## 2021-12-06 PROCEDURE — 96365 THER/PROPH/DIAG IV INF INIT: CPT | Mod: 59

## 2021-12-06 PROCEDURE — 258N000003 HC RX IP 258 OP 636: Performed by: EMERGENCY MEDICINE

## 2021-12-06 PROCEDURE — 120N000001 HC R&B MED SURG/OB

## 2021-12-06 PROCEDURE — 36415 COLL VENOUS BLD VENIPUNCTURE: CPT | Performed by: EMERGENCY MEDICINE

## 2021-12-06 PROCEDURE — 99223 1ST HOSP IP/OBS HIGH 75: CPT | Mod: AI | Performed by: INTERNAL MEDICINE

## 2021-12-06 PROCEDURE — 250N000009 HC RX 250: Performed by: INTERNAL MEDICINE

## 2021-12-06 PROCEDURE — 999N000157 HC STATISTIC RCP TIME EA 10 MIN

## 2021-12-06 PROCEDURE — 250N000009 HC RX 250: Performed by: EMERGENCY MEDICINE

## 2021-12-06 PROCEDURE — G1004 CDSM NDSC: HCPCS

## 2021-12-06 RX ORDER — CHOLECALCIFEROL (VITAMIN D3) 50 MCG
50 TABLET ORAL DAILY
Status: DISCONTINUED | OUTPATIENT
Start: 2021-12-07 | End: 2021-12-13 | Stop reason: HOSPADM

## 2021-12-06 RX ORDER — SODIUM BICARBONATE 650 MG/1
650 TABLET ORAL DAILY
Status: DISCONTINUED | OUTPATIENT
Start: 2021-12-07 | End: 2021-12-10

## 2021-12-06 RX ORDER — CARBAMAZEPINE 100 MG/5ML
150 SUSPENSION ORAL 3 TIMES DAILY
Status: DISCONTINUED | OUTPATIENT
Start: 2021-12-06 | End: 2021-12-13 | Stop reason: HOSPADM

## 2021-12-06 RX ORDER — CARBAMAZEPINE 100 MG/5ML
100 SUSPENSION ORAL DAILY
Status: DISCONTINUED | OUTPATIENT
Start: 2021-12-07 | End: 2021-12-13 | Stop reason: HOSPADM

## 2021-12-06 RX ORDER — SODIUM CHLORIDE, SODIUM LACTATE, POTASSIUM CHLORIDE, CALCIUM CHLORIDE 600; 310; 30; 20 MG/100ML; MG/100ML; MG/100ML; MG/100ML
125 INJECTION, SOLUTION INTRAVENOUS CONTINUOUS
Status: DISCONTINUED | OUTPATIENT
Start: 2021-12-06 | End: 2021-12-06

## 2021-12-06 RX ORDER — CALCIUM CARBONATE 1250 MG/5ML
1250 SUSPENSION ORAL 2 TIMES DAILY
Status: DISCONTINUED | OUTPATIENT
Start: 2021-12-06 | End: 2021-12-06

## 2021-12-06 RX ORDER — ONDANSETRON 4 MG/1
4 TABLET, ORALLY DISINTEGRATING ORAL EVERY 6 HOURS PRN
Status: DISCONTINUED | OUTPATIENT
Start: 2021-12-06 | End: 2021-12-13 | Stop reason: HOSPADM

## 2021-12-06 RX ORDER — LEVOTHYROXINE SODIUM 20 UG/ML
75 INJECTION, SOLUTION INTRAVENOUS DAILY
Status: DISCONTINUED | OUTPATIENT
Start: 2021-12-07 | End: 2021-12-07

## 2021-12-06 RX ORDER — SODIUM CHLORIDE, SODIUM LACTATE, POTASSIUM CHLORIDE, CALCIUM CHLORIDE 600; 310; 30; 20 MG/100ML; MG/100ML; MG/100ML; MG/100ML
INJECTION, SOLUTION INTRAVENOUS CONTINUOUS
Status: DISCONTINUED | OUTPATIENT
Start: 2021-12-06 | End: 2021-12-08

## 2021-12-06 RX ORDER — CALCIUM CARBONATE 1250 MG/5ML
1250 SUSPENSION ORAL 2 TIMES DAILY
Status: DISCONTINUED | OUTPATIENT
Start: 2021-12-07 | End: 2021-12-13 | Stop reason: HOSPADM

## 2021-12-06 RX ORDER — CARBAMAZEPINE 100 MG/5ML
150 SUSPENSION ORAL ONCE
Status: DISCONTINUED | OUTPATIENT
Start: 2021-12-06 | End: 2021-12-06

## 2021-12-06 RX ORDER — ASCORBIC ACID 500 MG
1000 TABLET ORAL DAILY
Status: DISCONTINUED | OUTPATIENT
Start: 2021-12-07 | End: 2021-12-13 | Stop reason: HOSPADM

## 2021-12-06 RX ORDER — IOPAMIDOL 755 MG/ML
79 INJECTION, SOLUTION INTRAVASCULAR ONCE
Status: COMPLETED | OUTPATIENT
Start: 2021-12-06 | End: 2021-12-06

## 2021-12-06 RX ORDER — ACETAMINOPHEN 325 MG/1
975 TABLET ORAL EVERY 8 HOURS PRN
Status: DISCONTINUED | OUTPATIENT
Start: 2021-12-06 | End: 2021-12-10

## 2021-12-06 RX ORDER — ACETAMINOPHEN 325 MG/1
975 TABLET ORAL EVERY 8 HOURS PRN
Status: DISCONTINUED | OUTPATIENT
Start: 2021-12-06 | End: 2021-12-06 | Stop reason: CLARIF

## 2021-12-06 RX ORDER — LIDOCAINE 40 MG/G
CREAM TOPICAL
Status: DISCONTINUED | OUTPATIENT
Start: 2021-12-06 | End: 2021-12-13 | Stop reason: HOSPADM

## 2021-12-06 RX ORDER — CARBAMAZEPINE 100 MG/5ML
100 SUSPENSION ORAL DAILY
Status: DISCONTINUED | OUTPATIENT
Start: 2021-12-06 | End: 2021-12-06

## 2021-12-06 RX ORDER — ONDANSETRON 2 MG/ML
4 INJECTION INTRAMUSCULAR; INTRAVENOUS EVERY 6 HOURS PRN
Status: DISCONTINUED | OUTPATIENT
Start: 2021-12-06 | End: 2021-12-13 | Stop reason: HOSPADM

## 2021-12-06 RX ORDER — ALBUTEROL SULFATE 5 MG/ML
2.5 SOLUTION RESPIRATORY (INHALATION)
Status: DISCONTINUED | OUTPATIENT
Start: 2021-12-06 | End: 2021-12-13 | Stop reason: HOSPADM

## 2021-12-06 RX ORDER — PIPERACILLIN SODIUM, TAZOBACTAM SODIUM 3; .375 G/15ML; G/15ML
3.38 INJECTION, POWDER, LYOPHILIZED, FOR SOLUTION INTRAVENOUS EVERY 6 HOURS
Status: DISCONTINUED | OUTPATIENT
Start: 2021-12-06 | End: 2021-12-06

## 2021-12-06 RX ADMIN — PIPERACILLIN AND TAZOBACTAM 3.38 G: 3; .375 INJECTION, POWDER, FOR SOLUTION INTRAVENOUS at 04:39

## 2021-12-06 RX ADMIN — CARBAMAZEPINE 150 MG: 100 SUSPENSION ORAL at 09:02

## 2021-12-06 RX ADMIN — LEVOTHYROXINE SODIUM 150 MCG: 150 TABLET ORAL at 17:42

## 2021-12-06 RX ADMIN — HYDROCORTISONE SODIUM SUCCINATE 50 MG: 100 INJECTION, POWDER, FOR SOLUTION INTRAMUSCULAR; INTRAVENOUS at 23:59

## 2021-12-06 RX ADMIN — SODIUM CHLORIDE, POTASSIUM CHLORIDE, SODIUM LACTATE AND CALCIUM CHLORIDE 125 ML/HR: 600; 310; 30; 20 INJECTION, SOLUTION INTRAVENOUS at 02:54

## 2021-12-06 RX ADMIN — CARBAMAZEPINE 150 MG: 100 SUSPENSION ORAL at 14:22

## 2021-12-06 RX ADMIN — IOPAMIDOL 80 ML: 755 INJECTION, SOLUTION INTRAVENOUS at 00:38

## 2021-12-06 RX ADMIN — PIPERACILLIN AND TAZOBACTAM 3.38 G: 3; .375 INJECTION, POWDER, FOR SOLUTION INTRAVENOUS at 22:29

## 2021-12-06 RX ADMIN — CARBAMAZEPINE 100 MG: 100 SUSPENSION ORAL at 17:57

## 2021-12-06 RX ADMIN — SODIUM CHLORIDE 62 ML: 9 INJECTION, SOLUTION INTRAVENOUS at 01:48

## 2021-12-06 RX ADMIN — SODIUM CHLORIDE, POTASSIUM CHLORIDE, SODIUM LACTATE AND CALCIUM CHLORIDE 125 ML/HR: 600; 310; 30; 20 INJECTION, SOLUTION INTRAVENOUS at 09:07

## 2021-12-06 RX ADMIN — IOPAMIDOL 79 ML: 755 INJECTION, SOLUTION INTRAVENOUS at 01:47

## 2021-12-06 RX ADMIN — PIPERACILLIN AND TAZOBACTAM 3.38 G: 3; .375 INJECTION, POWDER, FOR SOLUTION INTRAVENOUS at 11:20

## 2021-12-06 RX ADMIN — SODIUM CHLORIDE, POTASSIUM CHLORIDE, SODIUM LACTATE AND CALCIUM CHLORIDE 1000 ML: 600; 310; 30; 20 INJECTION, SOLUTION INTRAVENOUS at 05:21

## 2021-12-06 RX ADMIN — BRIVARACETAM 100 MG: 50 INJECTION, SUSPENSION INTRAVENOUS at 21:30

## 2021-12-06 RX ADMIN — SODIUM CHLORIDE, POTASSIUM CHLORIDE, SODIUM LACTATE AND CALCIUM CHLORIDE 1000 ML: 600; 310; 30; 20 INJECTION, SOLUTION INTRAVENOUS at 00:49

## 2021-12-06 RX ADMIN — HYDROCORTISONE SODIUM SUCCINATE 50 MG: 100 INJECTION, POWDER, FOR SOLUTION INTRAMUSCULAR; INTRAVENOUS at 17:26

## 2021-12-06 RX ADMIN — SODIUM CHLORIDE, POTASSIUM CHLORIDE, SODIUM LACTATE AND CALCIUM CHLORIDE: 600; 310; 30; 20 INJECTION, SOLUTION INTRAVENOUS at 18:48

## 2021-12-06 RX ADMIN — PIPERACILLIN AND TAZOBACTAM 3.38 G: 3; .375 INJECTION, POWDER, FOR SOLUTION INTRAVENOUS at 17:30

## 2021-12-06 RX ADMIN — SODIUM CHLORIDE 90 ML: 9 INJECTION, SOLUTION INTRAVENOUS at 00:39

## 2021-12-06 ASSESSMENT — ACTIVITIES OF DAILY LIVING (ADL)
ADLS_ACUITY_SCORE: 14
ADLS_ACUITY_SCORE: 22
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
## 2021-12-06 NOTE — PHARMACY-VANCOMYCIN DOSING SERVICE
"Pharmacy Vancomycin Initial Note  Date of Service 2021  Patient's  1960  61 year old, male    Indication: Aspiration Pneumonia and Sepsis    Current estimated CrCl = Estimated Creatinine Clearance: 64.5 mL/min (based on SCr of 1.24 mg/dL).    Creatinine for last 3 days  2021: 10:26 PM Creatinine 1.24 mg/dL    Recent Vancomycin Level(s) for last 3 days  No results found for requested labs within last 72 hours.      Vancomycin IV Administrations (past 72 hours)                   vancomycin (VANCOCIN) 1,750 mg in sodium chloride 0.9 % 500 mL intermittent infusion (mg) 1,750 mg Given 21 2245                Nephrotoxins and other renal medications (From now, onward)    Start     Dose/Rate Route Frequency Ordered Stop    21 0430  piperacillin-tazobactam (ZOSYN) 3.375 g vial to attach to  mL bag        Note to Pharmacy: For [facility], [facility] and [facility]: For Zosyn-naive patients, use the \"Zosyn initial dose + extended infusion\" order panel.    3.375 g  over 30 Minutes Intravenous EVERY 6 HOURS 21 2342            Contrast Orders - past 72 hours (72h ago, onward)            Start     Dose/Rate Route Frequency Ordered Stop    21 0145  iopamidol (ISOVUE-370) solution 79 mL         79 mL Intravenous ONCE 21 0145 21 0147    21 2350  iopamidol (ISOVUE-370) solution 80 mL         80 mL Intravenous ONCE 21 2348 21 0038          [employer] Prediction of Planned Initial Vancomycin Regimen  Loading dose: 1750 mg 21 @2245  Regimen: 1250 mg IV every 24 hours.  Start time: 22:45 on 2021  Exposure target: AUC24 (range)400-600 mg/L.hr   AUC24,ss: 527 mg/L.hr  Probability of AUC24 > 400: 79 %  Ctrough,ss: 17.7 mg/L  Probability of Ctrough,ss > 20: 38 %  Probability of nephrotoxicity (Lodise ERNESTO ): 14 %          Plan:  1. Start vancomycin 1250 mg IV q24h.   2. Vancomycin monitoring method: AUC  3. Vancomycin therapeutic monitoring goal: 400-600 " mg*h/L  4. Pharmacy will check vancomycin levels as appropriate in 1-3 Days.    5. Serum creatinine levels will be ordered daily for the first week of therapy and at least twice weekly for subsequent weeks.      Corina Donald RPH

## 2021-12-06 NOTE — ED TRIAGE NOTES
Patient arrived via EMS from home, care givers noted a fever of 103 and altered mentation this evening. Per EMS, temp 103.5, , patient is alert, unsure if level of coconsciousness is at baseline on arrival.

## 2021-12-06 NOTE — ED NOTES
[place]-rectal surgeon in to see patient.  States not concerned about his abdomen at this time.

## 2021-12-06 NOTE — ED PROVIDER NOTES
History     Chief Complaint:  Fever     HPI   History is limited as the patient is nonverbal    Keyon Farias is a 61 year old male with history of prior cardiac arrest, asthma, DVT, hyperlipidemia, CVA, TBI, epilepsy who presents with decreased responsiveness and fever.  The patient presents from home where he lives with his mother, she noted a fever increased work of breathing and decreased responsiveness.  Upon EMS arrival the patient was noted to be warm to the touch with significant work of breathing and O2 saturations in the 80s, he was placed on a nonrebreather, heart rate was noted to be quite tachycardic with rates in the 130s and 140s.  Patient cannot render any further history.    ROS:  Review of Systems   Unable to perform ROS: Patient nonverbal      Allergies:  Valproic acid  Phenytoin  Scopolamine     Medications:    Mucomyst  Albuterol  Aspirin 81 mg   Briviact  Tegretol  Cortef  Levothyroxine  Reflan  Protonix  Scopolamine  Depotestosterone  Vantin    Past Medical History:    TBI aphasia  DVT  GERD  Panhypopituitarism  Pneumonia  Sepsis due to UTI  Septic shock  Spastic hemiplegia  Thyroid disease  CVA  Ventricular fibrillation   Appendicitis  Epilepsy   Hyponatremia  Cardiac arrest  Necrotizing pancreatitis  Aspiration pneumonia  Acid reflux  Dysphagia  Bladder calculus  Hyperlipidemia   Hemiparesis, right  Atelectasis  Vitamin D deficiency   Traumatic encephalopathy  Asthma  Acid peptic disease    Past Surgical History:    Upper GI endoscopy  EGD x4  Reconstructive facial surgery  GJ tube change x16  G tube insertion  Appendectomy  PICC change  Right hand repair  Tracheostomy x2  Vascular surgery  Dental extraction  Contracture release, right arm  Contracture release, right ankle    Family History:    Father: kidney cancer    Social History:  Presents alone.  Presents via EMS.    Physical Exam     Patient Vitals for the past 24 hrs:   BP Temp Temp src Pulse Resp SpO2 Weight   12/06/21 0300 123/74  97.2  F (36.2  C) Temporal 92 (!) 31 97 % --   12/06/21 0230 114/70 -- -- 87 25 97 % --   12/06/21 0200 105/62 -- -- 91 26 97 % --   12/06/21 0100 108/77 -- -- 94 25 97 % --   12/06/21 0000 110/71 (!) 101.5  F (38.6  C) -- 112 24 97 % --   12/05/21 2330 100/69 (!) 102  F (38.9  C) -- 112 28 99 % --   12/05/21 2322 -- (!) 102  F (38.9  C) -- 111 26 100 % --   12/05/21 2320 102/63 (!) 101.7  F (38.7  C) -- 111 29 100 % --   12/05/21 2315 108/65 (!) 102.7  F (39.3  C) -- 113 (!) 31 100 % --   12/05/21 2300 99/66 (!) 102.4  F (39.1  C) -- 115 (!) 31 99 % --   12/05/21 2255 -- (!) 102.6  F (39.2  C) Bladder -- -- -- --   12/05/21 2241 -- -- -- (!) 122 (!) 31 96 % --   12/05/21 2220 105/60 -- -- (!) 123 (!) 46 97 % 71.1 kg (156 lb 12 oz)      Physical Exam  General: Alert, nonverbal, in moderate distress  Head:  Scalp is atraumatic  Eyes:  The pupils are equal, round, and reactive to light    EOM's intact    No scleral icterus  ENT:      Nose:  The external nose is normal  Ears:  External ears are normal  Mouth/Throat: The oropharynx is normal    Mucus membranes are dry      Neck:  Normal range of motion.      There is no rigidity.    Trachea is in the midline       Previous tracheostomy scar  CV:  Tachycardia    No murmur   Resp:  Breath sounds are coarse bilaterally    Moderate respiratory distress   GI:  Abdomen is soft, no distension, no tenderness.     G-tube in place with no signs of surrounding erythema  MS:  Contractures of the right upper extremity   skin:  Warm and dry, No rash or lesions noted.  Neuro: Nonverbal, no movement of the right upper extremity, withdraws the left upper extremity from painful stimuli    Emergency Department Course     ECG:  ECG taken at 2235, ECG read at 2243  Sinus tachycardia. Pulmonary disease pattern. Left anterior fascicular block. ST & T wave abnormality, consider lateral ischemia. Abnormal ECG.    Rate 121 bpm. RI interval 160 ms. QRS duration 76 ms. QT/QTc 308/437 ms. P-R-T  axes 101 -47 96.     Imaging:  CT Abdomen Pelvis w Contrast   Final Result   IMPRESSION:    1.  Small amount of free intraperitoneal gas as well as extraluminal gas in the fat of the abdomen. This extraluminal gas appears concentrated in the right upper quadrant near the cecum and ascending colon. No specific bowel abnormality is evident.   2.  Large amount of stool in the rectum. No bowel obstruction.   3.  Bilateral lower lobe infiltrates suspicious for pneumonia.      CT Chest Pulmonary Embolism w Contrast   Final Result   IMPRESSION:   1.  Negative for pulmonary embolism.      2.  Prominent bilateral lower lobe infiltrates compatible with pneumonitis. Appearance and distribution would not be typical for COVID-19 pneumonitis but not completely excluded. Aspiration pneumonitis could have this appearance. Clinical correlation.      3.  Extraluminal air is seen in the right upper quadrant around the colon. This is of indeterminate etiology but concerning for potential bowel perforation. Dedicated abdomen and pelvis CT would be helpful in further evaluation.      Findings called to Dr. Melendez 12/6/2021 12:51 AM.      Report per radiology    Laboratory:  Labs Ordered and Resulted from Time of ED Arrival to Time of ED Departure   COMPREHENSIVE METABOLIC PANEL - Abnormal       Result Value    Sodium 139      Potassium 4.5      Chloride 106      Carbon Dioxide (CO2) 27      Anion Gap 6      Urea Nitrogen 16      Creatinine 1.24      Calcium 8.8      Glucose 122 (*)     Alkaline Phosphatase 109      AST 22      ALT 24      Protein Total 8.0      Albumin 3.2 (*)     Bilirubin Total 0.7      GFR Estimate 63     INR - Abnormal    INR 1.28 (*)    PARTIAL THROMBOPLASTIN TIME - Abnormal    aPTT 41 (*)    LIPASE - Abnormal    Lipase 529 (*)    ROUTINE UA WITH MICROSCOPIC - Abnormal    Color Urine Yellow      Appearance Urine Clear      Glucose Urine Negative      Bilirubin Urine Negative      Ketones Urine Negative       Specific Gravity Urine 1.029      Blood Urine Negative      pH Urine 6.5      Protein Albumin Urine 30  (*)     Urobilinogen Urine 4.0 (*)     Nitrite Urine Negative      Leukocyte Esterase Urine Negative      Mucus Urine Present (*)     RBC Urine 0      WBC Urine 2      Hyaline Casts Urine 10 (*)    CBC WITH PLATELETS AND DIFFERENTIAL - Abnormal    WBC Count 11.9 (*)     RBC Count 5.28      Hemoglobin 15.7      Hematocrit 49.3      MCV 93      MCH 29.7      MCHC 31.8      RDW 13.9      Platelet Count 187      % Neutrophils 68      % Lymphocytes 24      % Monocytes 5      % Eosinophils 2      % Basophils 1      % Immature Granulocytes 0      NRBCs per 100 WBC 0      Absolute Neutrophils 8.1      Absolute Lymphocytes 2.9      Absolute Monocytes 0.6      Absolute Eosinophils 0.2      Absolute Basophils 0.1      Absolute Immature Granulocytes 0.0      Absolute NRBCs 0.0     GLUCOSE BY METER - Abnormal    GLUCOSE BY METER POCT 122 (*)    D DIMER QUANTITATIVE - Abnormal    D-Dimer Quantitative 1.90 (*)    LACTIC ACID WHOLE BLOOD - Abnormal    Lactic Acid 4.4 (*)    ISTAT GASES LACTATE VENOUS POCT - Abnormal    Lactic Acid POCT 3.4 (*)     Bicarbonate Venous POCT 28      O2 Sat, Venous POCT 52 (*)     pCO2V Venous POCT 48      pH Venous POCT 7.38      pO2 Venous POCT 29     LACTIC ACID WHOLE BLOOD - Abnormal    Lactic Acid 3.8 (*)    INFLUENZA A/B & SARS-COV2 PCR MULTIPLEX - Normal    Influenza A PCR Negative      Influenza B PCR Negative      SARS CoV2 PCR Negative     TROPONIN I - Normal    Troponin I High Sensitivity 9     PROCALCITONIN   BLOOD CULTURE   BLOOD CULTURE   URINE CULTURE     i-STAT CG4+:     pH 7.38  PCO2 48  PO2 29  HCO3 29    Lac 3.4      Emergency Department Course:  Reviewed:  I reviewed nursing notes, vitals, past medical history and Care Everywhere    Assessments:  2220 I obtained history and examined the patient as noted above.   2330 I rechecked the patient and explained findings.     Consults:    Dr. Leyva from general surgery  Dr. Callaway from colorectal surgery  Dr. Motta from the hospitalist service    Interventions:  Medications   sodium chloride (PF) 0.9% PF flush 3 mL (has no administration in time range)   sodium chloride (PF) 0.9% PF flush 3 mL (3 mLs Intracatheter Given 12/6/21 0048)   piperacillin-tazobactam (ZOSYN) 3.375 g vial to attach to  mL bag (has no administration in time range)   lactated ringers BOLUS 1,000 mL (1,000 mLs Intravenous New Bag 12/6/21 0049)     Followed by   lactated ringers infusion (has no administration in time range)   lactated ringers BOLUS 2,208 mL (0 mLs Intravenous Stopped 12/6/21 0045)   piperacillin-tazobactam (ZOSYN) 4.5 g vial to attach to  mL bag (0 g Intravenous Stopped 12/6/21 0010)   ibuprofen (ADVIL/MOTRIN) suspension 600 mg (600 mg Per G Tube Given 12/5/21 2242)   hydrocortisone sodium succinate PF (solu-CORTEF) injection 100 mg (100 mg Intravenous Given 12/5/21 2234)   vancomycin (VANCOCIN) 1,750 mg in sodium chloride 0.9 % 500 mL intermittent infusion (1,750 mg Intravenous Given 12/5/21 2245)   acetaminophen (TYLENOL) solution 650 mg (650 mg Oral Given 12/5/21 2253)   iopamidol (ISOVUE-370) solution 80 mL (80 mLs Intravenous Given 12/6/21 0038)   Saline Flush (90 mLs Intravenous Given 12/6/21 0039)      Disposition:  The patient was admitted to the hospital under the care of Dr. Motta.     Impression & Plan      The patient has signs of Septic Shock  The patient has signs of septic shock as evidenced by:  1. Presence of Sepsis, AND  2. Lactic Acidosis with value greater than or equal to 4    Time septic shock diagnosis confirmed = 0011 12/06/21   as this was the time when Lactate was resulted and the level was greater than or equal to 4    3 Hour Septic Shock Bundle Completion:  1. Initial Lactic Acid Result:   Recent Labs   Lab Test 12/06/21  0210 12/06/21 0011 12/05/21 2239   LACT 3.8* 4.4* 3.4*     2. Blood Cultures before  "Antibiotics: No, antibiotics were started prior to BCx collection b/c waiting for BCx to be collected would have been detrimental to the patient  3. Broad Spectrum Antibiotics Administered:  yes       Anti-infectives (From admission through now)    Start     Dose/Rate Route Frequency Ordered Stop    12/05/21 2240  vancomycin (VANCOCIN) 1,750 mg in sodium chloride 0.9 % 500 mL intermittent infusion         1,750 mg  over 2 Hours Intravenous ONCE 12/05/21 2239 12/06/21 0045    12/05/21 2225  piperacillin-tazobactam (ZOSYN) 4.5 g vial to attach to  mL bag        Note to Pharmacy: For [facility], [facility] and [facility]: For Zosyn-naive patients, use the \"Zosyn initial dose + extended infusion\" order panel.    4.5 g  over 30 Minutes Intravenous ONCE 12/05/21 2223 12/06/21 0010          4. IF patient is in septic shock within 6 hours of time zero, as defined by:  -Initial Hypotension:  2 low BP readings (SBP <90, MAP <65, or decrease > 40 from baseline due to infection) within 3 hrs of each other during the time period of 6hrs before and 6 hrs  after time zero  -Lactate > or = 4  THEN: Fluid volume administered in ED:  Full 30 mL/kg bolus given (see amount below).    BMI Readings from Last 1 Encounters:   12/05/21 21.56 kg/m      30 mL/kg fluids based on weight: 2,130 mL  30 mL/kg fluids based on IBW (must be >= 60 inches tall): 2,300 mL    Septic Shock reassessment:  1. Repeat Lactic Acid Level: 3.8  2. MAP>65 after initial IVF bolus, will continue to monitor fluid status and vital signs    I attest to having performed a repeat sepsis exam and assessment of perfusion at 0144 and the results demonstrate improved perfusion.    Covid-19  Keyon Farias was evaluated during a global COVID-19 pandemic, which necessitated consideration that the patient might be at risk for infection with the SARS-CoV-2 virus that causes COVID-19.   Applicable protocols for evaluation were followed during the patient's care.   COVID-19 was considered as part " of the patient's evaluation. The plan for testing is:  a test was obtained during this visit    Medical Decision Making:  Following presentation history and physical examination were performed, patient was immediately initiated on BiPAP and fluid resuscitation was undertaken.  Given the initial elevated lactic acid of 3.4 broad-spectrum antibiotics were administered nearly immediately.  Despite fluid resuscitation his lactic acid trended upwards.  And he received additional IV fluid boluses.  CT imaging of the chest demonstrates findings consistent with likely aspiration pneumonitis, the patient has had multiple episodes of this in the past.  Unfortunately it also demonstrates intraperitoneal free air, subsequently CT imaging of the abdomen and pelvis was undertaken and general surgical and colorectal surgical consultation was undertaken.   from colorectal surgery is agreed to see the patient in consultation.  Patient's heart rate trended downward after control of his fever and fluid resuscitation and he was maintained on broad-spectrum antibiotic coverage.  He will be admitted to the hospitalist service for further evaluation and treatment with possible operative planning.  The patient's guardian his mother was present throughout the majority of his emergency department stay.    Critical Care time:  was 45 minutes for this patient excluding procedures.    Diagnosis:    ICD-10-CM    1. Acute respiratory failure with hypoxia (H)  J96.01    2. Septic shock (H)  A41.9     R65.21    3. Aspiration pneumonitis (H)  J69.0    4. Free intraperitoneal air  K66.8        Scribe Disclosure:  Marion DYKES, am serving as a scribe at 11:43 PM on 12/5/2021 to document services personally performed by Jake Melendez MD based on my observations and the provider's statements to me.     Jake Melendez MD  12/06/21 [id number]

## 2021-12-06 NOTE — PHARMACY-ADMISSION MEDICATION HISTORY
Pharmacy Medication History  Admission medication history interview status for the 12/5/2021  admission is complete. See EPIC admission navigator for prior to admission medications     Location of Interview: Phone  Medication history sources: Patient's family/friend (Mother)    Significant changes made to the medication list:  No changes from recent discharge    In the past week, patient estimated taking medication this percent of the time: greater than 90%    Additional medication history information:   N/A    Medication reconciliation completed by provider prior to medication history? Yes    Time spent in this activity: 15 minutes     Prior to Admission medications    Medication Sig Last Dose Taking? Auth Provider   acetaminophen (TYLENOL 8 HOUR) 650 MG CR tablet Take 650 mg by mouth every 8 hours as needed for mild pain or fever  Yes Unknown, Entered By History   acetylcysteine (MUCOMYST) 20 % neb solution Take 2 mLs by nebulization 4 times daily With albuterol at 0700, 1100, 1500, and 1900   Yes Unknown, Entered By History   albuterol (PROVENTIL) (5 MG/ML) 0.5% neb solution Take 2.5 mg by nebulization every 4 hours (while awake) 0700 1100 1500 1900 with mucomyst   Yes Unknown, Entered By History   aspirin (ASA) 81 MG chewable tablet 81 mg by Oral or Feeding Tube route daily At 0900  Yes Unknown, Entered By History   bacitracin ointment Apply topically daily as needed for wound care To PEG site.   Yes Unknown, Entered By History   Brivaracetam (BRIVIACT) 10 MG/ML solution 100 mg by Oral or Feeding Tube route 2 times daily 0900, 2100  Yes Unknown, Entered By History   calcium carbonate 1250 MG/5ML SUSP suspension Take 1,250 mg by mouth 2 times daily 0900, 2100  Yes Unknown, Entered By History   carBAMazepine (TEGRETOL) 100 MG/5ML suspension Take 100 mg by mouth daily Take at 1800   Yes Unknown, Entered By History   carBAMazepine (TEGRETOL) 100 MG/5ML suspension 150 mg by Oral or Feeding Tube route 3 times daily At  06:00, 12:00, and 24:00 for seizures  Yes Unknown, Entered By History   clotrimazole-betamethasone (LOTRISONE) 1-0.05 % external cream Apply topically 2 times daily as needed   Yes Leticia Santiago MD   hydrocortisone (CORTEF) 5 MG tablet Take 15 mg (3 tablets) in the morning and 5 mg (1 tablet)  at 2:00 PM. During illness patient takes more as a stress dose. Please increase the dose as directed.  Yes Faizan Mclean MD   hydrocortisone 1 % CREA cream Place rectally 2 times daily as needed for other Apply to reddened memo areas as needed  Yes Unknown, Entered By History   levothyroxine (SYNTHROID/LEVOTHROID) 150 MCG tablet Take 1 tablet (150 mcg) by mouth daily  Yes Faizan Mclean MD   metoclopramide (REGLAN) 10 MG/10ML SOLN solution Take 10 mg by mouth 4 times daily (before meals and nightly) 0800, 1200, 1600, 2000  Disconnects bag before administration, then waits 45 mins before reconnecting after giving the medication  Yes Unknown, Entered By History   multivitamin, therapeutic (THERA-VIT) TABS tablet Take 1 tablet by mouth daily  Yes Reported, Patient   mupirocin (BACTROBAN) 2 % external ointment Apply topically 2 times daily as needed   Yes Reported, Patient   pantoprazole (PROTONIX) 2 mg/mL SUSP suspension 20 mLs (40 mg) by Per J Tube route daily  Yes Alvaro Barahona MD   potassium & sodium phosphates (NEUTRA-PHOS) 280-160-250 MG Packet Take 1 packet by mouth 3 times daily   Yes Yanely Liriano MD   Scopolamine HBr POWD Dispense #90. Mix contents with small amount of water for admin via J-tube.  Administer 0.8 mg three times each day.  Patient taking differently: Dispense #90. Mix contents with small amount of water for admin via J-tube.  Administer 0.8 mg three times each day as needed.  Yes Jennie Bermudez MD   Skin Protectants, Misc. (BALMEX SKIN PROTECTANT) OINT Externally apply topically 2 times daily as needed (irritation) Applay to reddened memo areas twice daily as needed  Yes  Unknown, Entered By History   sodium bicarbonate 650 MG tablet Take 1 tablet (650 mg) by mouth daily  Yes Ricky Torres MD   testosterone cypionate (DEPOTESTOSTERONE) 200 MG/ML injection Inject 0.3 mLs (60 mg) into the muscle every 7 days  Yes Faizan Mclean MD   vitamin C (ASCORBIC ACID) 1000 MG TABS 1,000 mg by Oral or Feeding Tube route daily   Yes Unknown, Entered By History   vitamin D3 (CHOLECALCIFEROL) 2000 units (50 mcg) tablet Take 2,000 Units by mouth daily Crush and feed via j-tube @@ 0900  Yes Unknown, Entered By History       The information provided in this note is only as accurate as the sources available at the time of update(s)

## 2021-12-06 NOTE — ED NOTES
Readjusted patient.  Heels up off bed & onto pillow.  Socks onto feet as feet feel cool.  Warm blanket applied.

## 2021-12-06 NOTE — H&P
Brief Admit note:    Dictated # [id number]    61 year old male with TBI, prior cardia arrest asthma, dvt, hyperlipidemia with seizure, panhypopit recurrent aspiration admitted with fever, hypotension, elevation of lactic acid, bilalteral lower lob infiltrate and small amount of free air in abd with hx of GJ tube with exchange in Nov.    Patient improved with IVF, seen by CRS about free air and is npo for 24 hrs with serial abd exam.  Continue zosyn, vancomycin, stress dose steroids.  Continue other seizure medications once verified.      Manish Motta MD
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
## 2021-12-06 NOTE — ED NOTES
Bed: ST  Expected date: 12/5/21  Expected time: 10:10 PM  Means of arrival: Ambulance  Comments:  Kerri 1: RED: 61M sepsis, hypotensive, febrile, SaO2 80%

## 2021-12-06 NOTE — PROGRESS NOTES
[facility]    Internal Medicine Hospitalist Progress Note  12/06/2021  I evaluated patient on the above date.    Luis Westfall Jr., MD  [phone] (p)  Text Page  Vocera        Assessment & Plan New actions/orders today (12/06/2021) are underlined.    Keyon Farias is a 61 year old male with history including TBI with aphasia, R sided spastic hemiplegia and dysphagia with GJ-tube for TFs/meds; seizure disorder; panhypopituitarism; and frequent hospital admissions for recurrent pneumonia (most recently 11/12-11/17/2021; with 7 hospitalizations so far 2021 mostly for asp pneumonia); who presented 12/5/2021 with fever and AMS and was found with evidence of pneumonia as well as free intraperitoneal air.    Recurrent aspiration pneumonia with acute hypoxic respiratory failure and sepsis.  * Multiple hospitalizations for aspiration pneumonia, last in 11/2021. Has chronic GJ-tube.  * Presented 12/5 with fevers and AMS. Noted to be hypoxic by EMS. On initial evaluation 12/5 was febrile, tachycardic, tachypneic, sats in 90's on O2; WBC 11.9, lactate 3.4, procalcitonin 0.21, d-dimer 1.9. CT chest 12/6 showed no PE: prominent bilateral lower lobe infiltrates compatible with pneumonitis; also extraluminal air (see below). Required BiPAP in the ED. Started on pip-tazo and vanco 12/5. BC's 12/5 pending.  Recent Labs   Lab 12/06/21  0210 12/06/21  0011 12/05/21  2239 12/05/21  2226   WBC  --   --   --  11.9*   LACT 3.8* 4.4* 3.4*  --    PCAL  --   --   --  0.21*   - Continue BiPAP, wean as able.  - Continue pip-tazo (started 12/5) and vancomycin (started 12/5).  - Continue PTA acetylcysteine + albuterol nebs QID and PRN nebs.  - Continue IVF's.  - Monitor cultures.    Intraperitoneal free air/gas, possibly related to recent GJ-tube exchange.  * CT chest 12/6 also showed extraluminal air is seen in the right upper quadrant around the colon, noted this was of indeterminate etiology but concerning for  potential bowel perforation. CT abdomen and pelvis 12/6 showed small amount of free intraperitoneal gas as well as extraluminal gas in the fat of the abdomen; noted that this extraluminal gas appeared concentrated in the right upper quadrant near the cecum and ascending colon; no specific bowel abnormality was evident. Evaluated by CRS in the ED and abdomen was benign and felt that CT findings could be related to recent GJ-tube replacement (11/16). Recommended NPO and serial abdominal exams.  - Continue to hold TF's for 24h (until ~ 12/7 am); if clinically stable, then can possibly resume TF's.  - Continue serial abdominal exams.  - Appreciate CRS help.    TBI with aphasia, dysphagia, and R sided spastic hemiplegia.   Dysphagia with GJ-tube for TFs.  * Patient's mother is his caregiver, along with home care attendants. Has frequent hospitalization for recurrent pneumonias.   * GJ-tube replaced 11/16.  * Issues with free intraperitoneal air/gas as above. TF's held on admit.  - Continue to hold TF's; possibly resume 12/7 am if stable.  - Hold PTA scheduled metoclopramide and scopolamine for now given above.    Seizure disorder.  Secondary to TBI. Chronic and stable on PTA AEDs.  - Ask Pharmacy to change brivaracetam and carbamazepine to IV equivalents.    ADDENDUM 14:44:  Pharmacist able to convert brivaracetam to IV but no IV equivalent for carbamazepime.  - Cautiously resume carbamazepine.     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  - Continue hydrocortisone 50 mg IV q8h for now.  - Continue levothyroxine IV for now.  - Continue testosterone IM weekly.     GERD.  - Continue pantoprazole IV for now.      COVID-19 testing.  COVID-19 PCR Results    COVID-19 PCR Results 2/22/21 4/15/21 5/23/21 8/9/21 8/24/21 9/30/21 10/28/21 11/12/21 11/24/21 12/5/21   COVID-19 Virus (Coronavirus) PCR - [facility] Result             COVID-19 Virus PCR to [facility] - Result             COVID-19  Virus PCR to [facility] - Source             SARS-CoV-2 Virus Specimen Source   Nasopharyngeal          Flu A/B & SARS-COV-2 PCR Source Nasopharyngeal Nasopharyngeal           SARS-CoV-2 PCR Result NEGATIVE NEGATIVE NEGATIVE          SARS CoV2 PCR    Negative Negative Negative Negative Negative Negative Negative      Comments are available for some flowsheets but are not being displayed.         COVID-19 Antibody Results, Testing for Immunity    COVID-19 Antibody Results, Testing for Immunity   No data to display.             Diet:   NPO  Prophylaxis: PCD's, ambulation.   Lerma Catheter: PRESENT, indication: Strict 1-2 Hour I&O  Central Lines: None  Code Status: Prior    Disposition Plan   Expected discharge: 2-3d recommended to prior living arrangement pending above.  Entered: Luis Westfall MD 12/06/2021, 8:54 AM         Interval History   Seen in ED.  Remains on BiPAP.    -Data reviewed today: I reviewed all new labs and imaging over the last 24 hours. I personally reviewed no images or EKG's today.    Physical Exam    , Blood pressure 104/73, pulse 89, temperature 97.2  F (36.2  C), temperature source Temporal, resp. rate 10, weight 71.1 kg (156 lb 12 oz), SpO2 96 %.  Vitals:    12/05/21 2220   Weight: 71.1 kg (156 lb 12 oz)     Vital Signs with Ranges  Temp:  [97.2  F (36.2  C)-102.7  F (39.3  C)] 97.2  F (36.2  C)  Pulse:  [] 89  Resp:  [10-46] 10  BP: ()/(59-84) 104/73  FiO2 (%):  [100 %] 100 %  SpO2:  [93 %-100 %] 96 %  Patient Vitals for the past 24 hrs:   BP Temp Temp src Pulse Resp SpO2 Weight   12/06/21 0630 104/73 -- -- 89 10 -- --   12/06/21 0600 117/71 -- -- 96 22 96 % --   12/06/21 0530 122/70 -- -- 94 16 96 % --   12/06/21 0500 (!) 87/62 -- -- 93 21 96 % --   12/06/21 0450 (!) 87/61 -- -- 93 20 96 % --   12/06/21 0430 101/59 -- -- 92 22 -- --   12/06/21 0415 114/61 -- -- 93 22 -- --   12/06/21 0400 116/72 -- -- 94 26 -- --   12/06/21 0330 112/63 -- -- 93 21 98 % --   12/06/21 0315  119/84 -- -- 89 30 93 % --   12/06/21 0300 123/74 97.2  F (36.2  C) Temporal 92 (!) 31 97 % --   12/06/21 0245 112/70 -- -- 90 25 96 % --   12/06/21 0230 114/70 -- -- 87 25 97 % --   12/06/21 0200 105/62 -- -- 91 26 97 % --   12/06/21 0100 108/77 -- -- 94 25 97 % --   12/06/21 0000 110/71 (!) 101.5  F (38.6  C) -- 112 24 97 % --   12/05/21 2330 100/69 (!) 102  F (38.9  C) -- 112 28 99 % --   12/05/21 2322 -- (!) 102  F (38.9  C) -- 111 26 100 % --   12/05/21 2320 102/63 (!) 101.7  F (38.7  C) -- 111 29 100 % --   12/05/21 2315 108/65 (!) 102.7  F (39.3  C) -- 113 (!) 31 100 % --   12/05/21 2300 99/66 (!) 102.4  F (39.1  C) -- 115 (!) 31 99 % --   12/05/21 2255 -- (!) 102.6  F (39.2  C) Bladder -- -- -- --   12/05/21 2241 -- -- -- (!) 122 (!) 31 96 % --   12/05/21 2220 105/60 -- -- (!) 123 (!) 46 97 % 71.1 kg (156 lb 12 oz)     I/O's Last 24 hours  I/O last 3 completed shifts:  In: 1100 [IV Piggyback:1100]  Out: 900 [Urine:900]    Constitutional: Awake, alert, nonverbal.  Respiratory: Diminished in bases. No crackles or wheezes.  Cardiovascular: RRR, no m/r/g.  GI: Soft, nd, no obvious tenderness, +BS.  Skin/Integumen: No bilateral lower extremity edema.  Other:        Data   Recent Labs   Lab 12/05/21 2227 12/05/21 2226   WBC  --  11.9*   HGB  --  15.7   MCV  --  93   PLT  --  187   INR  --  1.28*   NA  --  139   POTASSIUM  --  4.5   CHLORIDE  --  106   CO2  --  27   BUN  --  16   CR  --  1.24   ANIONGAP  --  6   STEVE  --  8.8   * 122*   ALBUMIN  --  3.2*   PROTTOTAL  --  8.0   BILITOTAL  --  0.7   ALKPHOS  --  109   ALT  --  24   AST  --  22   LIPASE  --  529*   TROPONINIS  --  9     Recent Labs   Lab Test 12/05/21 2227 12/05/21 2226 11/24/21  0032 11/15/21  0956 11/14/21  1152 07/21/21  1106 05/25/21  1255 05/25/21  0746 05/25/21  0429 05/24/21  2358 05/24/21  2041   * 122* 117* 116* 156*   < >  --   --   --   --   --    BGM  --   --   --   --   --   --  96 92 136* 163* 155*    < > = values in  this interval not displayed.     Recent Labs   Lab 12/05/21  2226   WBC 11.9*   DD 1.90*   PCAL 0.21*         Recent Results (from the past 24 hour(s))   CT Chest Pulmonary Embolism w Contrast    Narrative    EXAM: CT CHEST PULMONARY EMBOLISM W CONTRAST  LOCATION: [facility]  DATE/TIME: 12/5/2021 11:47 PM    INDICATION: Decreased responsiveness and fever.  COMPARISON: 4/16/2021.  TECHNIQUE: CT chest pulmonary angiogram during arterial phase injection of IV contrast. Multiplanar reformats and MIP reconstructions were performed. Dose reduction techniques were used.   CONTRAST: 80 mL Isovue-370.    FINDINGS:  ANGIOGRAM CHEST: Pulmonary arteries are normal caliber and negative for pulmonary emboli. Thoracic aorta is negative for dissection. No CT evidence of right heart strain.    LUNGS AND PLEURA: Prominent bilateral lower lobe infiltrate/consolidation, right greater than left with an appearance compatible with acute pneumonitis. Appearance and distribution would not be typical for COVID-19 pneumonitis but not completely   excluded. Aspiration pneumonitis could potentially give this appearance. Clinical correlation. No pleural effusions.    MEDIASTINUM/AXILLAE: Few hilar and mediastinal nodes are favored to be reactive in nature. Normal heart size. No pericardial effusion. Normal caliber thoracic aorta. Esophagus is grossly negative.    CORONARY ARTERY CALCIFICATION: None.    UPPER ABDOMEN: Small cyst in the left hepatic lobe. Gallbladder appears absent. Percutaneous gastrojejunostomy tube partially visualized. Small amounts of extraluminal air seen in the right upper quadrant about the colon of uncertain significance or   etiology. Bowel perforation cannot be excluded. Dedicated CT of the abdomen and pelvis suggested for further evaluation.    MUSCULOSKELETAL: Normal.      Impression    IMPRESSION:  1.  Negative for pulmonary embolism.    2.  Prominent bilateral lower lobe infiltrates  compatible with pneumonitis. Appearance and distribution would not be typical for COVID-19 pneumonitis but not completely excluded. Aspiration pneumonitis could have this appearance. Clinical correlation.    3.  Extraluminal air is seen in the right upper quadrant around the colon. This is of indeterminate etiology but concerning for potential bowel perforation. Dedicated abdomen and pelvis CT would be helpful in further evaluation.    Findings called to Dr. Melendez 12/6/2021 12:51 AM.   CT Abdomen Pelvis w Contrast    Narrative    EXAM: CT ABDOMEN PELVIS W CONTRAST  LOCATION: [facility]  DATE/TIME: 12/6/2021 1:27 AM    INDICATION: Abdominal pain and fever. Free air in the upper abdomen on chest CT.  COMPARISON: Chest CT 12/5/2021. CAP 1/15/2021.  TECHNIQUE: CT scan of the abdomen and pelvis was performed following injection of IV contrast. Multiplanar reformats were obtained. Dose reduction techniques were used.  CONTRAST: 79 mL Isovue-370.    FINDINGS:   LOWER CHEST: Bilateral lower lobe infiltrates, right greater than left.    HEPATOBILIARY: The gallbladder is absent. Small cyst in the left lobe of the liver.    PANCREAS: 1.9 cm cyst in the pancreas tail has decreased in size in the interval.    SPLEEN: Normal.    ADRENAL GLANDS: Normal.    KIDNEYS/BLADDER: No hydronephrosis. Catheter in the urinary bladder.    BOWEL: There is a large amount of stool in the rectum. GJ tube in place. There is no bowel obstruction or inflammation. There is a small amount of free intraperitoneal air and several gas collections in the fat of the abdomen. Extraluminal gas   collections appear to be concentrated in the right upper quadrant near the cecum which is located in the right upper quadrant. The appendix is not identified though there appear to be postoperative changes of the cecum suggesting previous appendectomy.    LYMPH NODES: Normal.    VASCULATURE: Atherosclerotic calcification of the aorta and  its branches. No aneurysm.    PELVIC ORGANS: Normal.    MUSCULOSKELETAL: Mild degenerative disease in the spine.      Impression    IMPRESSION:   1.  Small amount of free intraperitoneal gas as well as extraluminal gas in the fat of the abdomen. This extraluminal gas appears concentrated in the right upper quadrant near the cecum and ascending colon. No specific bowel abnormality is evident.  2.  Large amount of stool in the rectum. No bowel obstruction.  3.  Bilateral lower lobe infiltrates suspicious for pneumonia.       Medications   All medications were reviewed.    lactated ringers 125 mL/hr (12/06/21 0522)     lactated ringers         carBAMazepine  150 mg Oral or Feeding Tube TID     carBAMazepine  150 mg Oral or Feeding Tube Once     piperacillin-tazobactam  3.375 g Intravenous Q6H     sodium chloride (PF)  3 mL Intracatheter Q8H     sodium chloride (PF)

## 2021-12-07 LAB
ALBUMIN SERPL-MCNC: 2.3 G/DL (ref 3.4–5)
ALP SERPL-CCNC: 66 U/L (ref 40–150)
ALT SERPL W P-5'-P-CCNC: 22 U/L (ref 0–70)
ANION GAP SERPL CALCULATED.3IONS-SCNC: 2 MMOL/L (ref 3–14)
AST SERPL W P-5'-P-CCNC: 37 U/L (ref 0–45)
BACTERIA UR CULT: NO GROWTH
BASOPHILS # BLD AUTO: 0 10E3/UL (ref 0–0.2)
BASOPHILS NFR BLD AUTO: 0 %
BILIRUB DIRECT SERPL-MCNC: 0.2 MG/DL (ref 0–0.2)
BILIRUB SERPL-MCNC: 0.6 MG/DL (ref 0.2–1.3)
BUN SERPL-MCNC: 10 MG/DL (ref 7–30)
CALCIUM SERPL-MCNC: 8.3 MG/DL (ref 8.5–10.1)
CHLORIDE BLD-SCNC: 116 MMOL/L (ref 94–109)
CO2 SERPL-SCNC: 27 MMOL/L (ref 20–32)
CREAT SERPL-MCNC: 0.9 MG/DL (ref 0.66–1.25)
EOSINOPHIL # BLD AUTO: 0 10E3/UL (ref 0–0.7)
EOSINOPHIL NFR BLD AUTO: 0 %
ERYTHROCYTE [DISTWIDTH] IN BLOOD BY AUTOMATED COUNT: 14 % (ref 10–15)
GFR SERPL CREATININE-BSD FRML MDRD: >90 ML/MIN/1.73M2
GLUCOSE BLD-MCNC: 159 MG/DL (ref 70–99)
HCT VFR BLD AUTO: 36.8 % (ref 40–53)
HGB BLD-MCNC: 11.4 G/DL (ref 13.3–17.7)
IMM GRANULOCYTES # BLD: 0.1 10E3/UL
IMM GRANULOCYTES NFR BLD: 1 %
LYMPHOCYTES # BLD AUTO: 1.1 10E3/UL (ref 0.8–5.3)
LYMPHOCYTES NFR BLD AUTO: 11 %
MCH RBC QN AUTO: 29.8 PG (ref 26.5–33)
MCHC RBC AUTO-ENTMCNC: 31 G/DL (ref 31.5–36.5)
MCV RBC AUTO: 96 FL (ref 78–100)
MONOCYTES # BLD AUTO: 0.4 10E3/UL (ref 0–1.3)
MONOCYTES NFR BLD AUTO: 4 %
MRSA DNA SPEC QL NAA+PROBE: POSITIVE
NEUTROPHILS # BLD AUTO: 8.7 10E3/UL (ref 1.6–8.3)
NEUTROPHILS NFR BLD AUTO: 84 %
NRBC # BLD AUTO: 0 10E3/UL
NRBC BLD AUTO-RTO: 0 /100
PLATELET # BLD AUTO: 121 10E3/UL (ref 150–450)
POTASSIUM BLD-SCNC: 3.8 MMOL/L (ref 3.4–5.3)
PROT SERPL-MCNC: 6.6 G/DL (ref 6.8–8.8)
RBC # BLD AUTO: 3.82 10E6/UL (ref 4.4–5.9)
SA TARGET DNA: POSITIVE
SODIUM SERPL-SCNC: 145 MMOL/L (ref 133–144)
WBC # BLD AUTO: 10.3 10E3/UL (ref 4–11)

## 2021-12-07 PROCEDURE — 250N000011 HC RX IP 250 OP 636: Performed by: INTERNAL MEDICINE

## 2021-12-07 PROCEDURE — 258N000003 HC RX IP 258 OP 636: Performed by: INTERNAL MEDICINE

## 2021-12-07 PROCEDURE — 250N000009 HC RX 250: Performed by: HOSPITALIST

## 2021-12-07 PROCEDURE — 999N000157 HC STATISTIC RCP TIME EA 10 MIN

## 2021-12-07 PROCEDURE — 99233 SBSQ HOSP IP/OBS HIGH 50: CPT | Performed by: HOSPITALIST

## 2021-12-07 PROCEDURE — 250N000013 HC RX MED GY IP 250 OP 250 PS 637: Performed by: INTERNAL MEDICINE

## 2021-12-07 PROCEDURE — 80048 BASIC METABOLIC PNL TOTAL CA: CPT | Performed by: INTERNAL MEDICINE

## 2021-12-07 PROCEDURE — 250N000013 HC RX MED GY IP 250 OP 250 PS 637: Performed by: HOSPITALIST

## 2021-12-07 PROCEDURE — 250N000009 HC RX 250: Performed by: INTERNAL MEDICINE

## 2021-12-07 PROCEDURE — 94640 AIRWAY INHALATION TREATMENT: CPT | Mod: 76

## 2021-12-07 PROCEDURE — 36415 COLL VENOUS BLD VENIPUNCTURE: CPT | Performed by: INTERNAL MEDICINE

## 2021-12-07 PROCEDURE — 94640 AIRWAY INHALATION TREATMENT: CPT

## 2021-12-07 PROCEDURE — 85025 COMPLETE CBC W/AUTO DIFF WBC: CPT | Performed by: INTERNAL MEDICINE

## 2021-12-07 PROCEDURE — 120N000001 HC R&B MED SURG/OB

## 2021-12-07 PROCEDURE — 250N000011 HC RX IP 250 OP 636: Performed by: EMERGENCY MEDICINE

## 2021-12-07 PROCEDURE — G0463 HOSPITAL OUTPT CLINIC VISIT: HCPCS

## 2021-12-07 PROCEDURE — 250N000009 HC RX 250

## 2021-12-07 PROCEDURE — 87641 MR-STAPH DNA AMP PROBE: CPT | Performed by: INTERNAL MEDICINE

## 2021-12-07 PROCEDURE — 82248 BILIRUBIN DIRECT: CPT | Performed by: INTERNAL MEDICINE

## 2021-12-07 PROCEDURE — C9113 INJ PANTOPRAZOLE SODIUM, VIA: HCPCS | Performed by: INTERNAL MEDICINE

## 2021-12-07 RX ORDER — METOCLOPRAMIDE HYDROCHLORIDE 5 MG/5ML
10 SOLUTION ORAL
Status: DISCONTINUED | OUTPATIENT
Start: 2021-12-07 | End: 2021-12-10

## 2021-12-07 RX ORDER — ASPIRIN 81 MG/1
81 TABLET, CHEWABLE ORAL DAILY
Status: DISCONTINUED | OUTPATIENT
Start: 2021-12-07 | End: 2021-12-13 | Stop reason: HOSPADM

## 2021-12-07 RX ORDER — DEXTROSE MONOHYDRATE 100 MG/ML
INJECTION, SOLUTION INTRAVENOUS CONTINUOUS PRN
Status: DISCONTINUED | OUTPATIENT
Start: 2021-12-07 | End: 2021-12-13 | Stop reason: HOSPADM

## 2021-12-07 RX ORDER — ALBUTEROL SULFATE 0.83 MG/ML
SOLUTION RESPIRATORY (INHALATION)
Status: COMPLETED
Start: 2021-12-07 | End: 2021-12-07

## 2021-12-07 RX ORDER — ACETYLCYSTEINE 200 MG/ML
2 SOLUTION ORAL; RESPIRATORY (INHALATION)
Status: DISCONTINUED | OUTPATIENT
Start: 2021-12-07 | End: 2021-12-13 | Stop reason: HOSPADM

## 2021-12-07 RX ORDER — LEVOTHYROXINE SODIUM 150 UG/1
150 TABLET ORAL
Status: DISCONTINUED | OUTPATIENT
Start: 2021-12-08 | End: 2021-12-10

## 2021-12-07 RX ORDER — HYDROCORTISONE 5 MG/1
5 TABLET ORAL DAILY
Status: DISCONTINUED | OUTPATIENT
Start: 2021-12-07 | End: 2021-12-08

## 2021-12-07 RX ADMIN — POTASSIUM & SODIUM PHOSPHATES POWDER PACK 280-160-250 MG 1 PACKET: 280-160-250 PACK at 16:46

## 2021-12-07 RX ADMIN — PIPERACILLIN AND TAZOBACTAM 3.38 G: 3; .375 INJECTION, POWDER, FOR SOLUTION INTRAVENOUS at 09:52

## 2021-12-07 RX ADMIN — ACETYLCYSTEINE 2 ML: 200 SOLUTION ORAL; RESPIRATORY (INHALATION) at 16:23

## 2021-12-07 RX ADMIN — ALBUTEROL SULFATE 2.5 MG: 2.5 SOLUTION RESPIRATORY (INHALATION) at 19:45

## 2021-12-07 RX ADMIN — ALBUTEROL SULFATE 2.5 MG: 2.5 SOLUTION RESPIRATORY (INHALATION) at 11:18

## 2021-12-07 RX ADMIN — PANTOPRAZOLE SODIUM 40 MG: 40 INJECTION, POWDER, FOR SOLUTION INTRAVENOUS at 09:15

## 2021-12-07 RX ADMIN — ACETYLCYSTEINE 2 ML: 200 SOLUTION ORAL; RESPIRATORY (INHALATION) at 19:44

## 2021-12-07 RX ADMIN — CARBAMAZEPINE 150 MG: 100 SUSPENSION ORAL at 11:49

## 2021-12-07 RX ADMIN — VANCOMYCIN HYDROCHLORIDE 1250 MG: 5 INJECTION, POWDER, LYOPHILIZED, FOR SOLUTION INTRAVENOUS at 22:00

## 2021-12-07 RX ADMIN — BRIVARACETAM 100 MG: 10 SOLUTION ORAL at 21:52

## 2021-12-07 RX ADMIN — CARBAMAZEPINE 150 MG: 100 SUSPENSION ORAL at 00:17

## 2021-12-07 RX ADMIN — CARBAMAZEPINE 100 MG: 100 SUSPENSION ORAL at 09:16

## 2021-12-07 RX ADMIN — ASPIRIN 81 MG CHEWABLE TABLET 81 MG: 81 TABLET CHEWABLE at 16:47

## 2021-12-07 RX ADMIN — Medication 50 MCG: at 09:16

## 2021-12-07 RX ADMIN — PIPERACILLIN AND TAZOBACTAM 3.38 G: 3; .375 INJECTION, POWDER, FOR SOLUTION INTRAVENOUS at 16:47

## 2021-12-07 RX ADMIN — POTASSIUM & SODIUM PHOSPHATES POWDER PACK 280-160-250 MG 1 PACKET: 280-160-250 PACK at 21:52

## 2021-12-07 RX ADMIN — CALCIUM CARBONATE 1250 MG: 1250 SUSPENSION ORAL at 09:15

## 2021-12-07 RX ADMIN — ALBUTEROL SULFATE 2.5 MG: 2.5 SOLUTION RESPIRATORY (INHALATION) at 00:40

## 2021-12-07 RX ADMIN — PIPERACILLIN AND TAZOBACTAM 3.38 G: 3; .375 INJECTION, POWDER, FOR SOLUTION INTRAVENOUS at 21:53

## 2021-12-07 RX ADMIN — OXYCODONE HYDROCHLORIDE AND ACETAMINOPHEN 1000 MG: 500 TABLET ORAL at 09:16

## 2021-12-07 RX ADMIN — CARBAMAZEPINE 150 MG: 100 SUSPENSION ORAL at 05:37

## 2021-12-07 RX ADMIN — PIPERACILLIN AND TAZOBACTAM 3.38 G: 3; .375 INJECTION, POWDER, FOR SOLUTION INTRAVENOUS at 04:16

## 2021-12-07 RX ADMIN — SODIUM CHLORIDE, POTASSIUM CHLORIDE, SODIUM LACTATE AND CALCIUM CHLORIDE: 600; 310; 30; 20 INJECTION, SOLUTION INTRAVENOUS at 22:01

## 2021-12-07 RX ADMIN — CALCIUM CARBONATE 1250 MG: 1250 SUSPENSION ORAL at 21:52

## 2021-12-07 RX ADMIN — HYDROCORTISONE SODIUM SUCCINATE 50 MG: 100 INJECTION, POWDER, FOR SOLUTION INTRAMUSCULAR; INTRAVENOUS at 09:15

## 2021-12-07 RX ADMIN — VANCOMYCIN HYDROCHLORIDE 1250 MG: 5 INJECTION, POWDER, LYOPHILIZED, FOR SOLUTION INTRAVENOUS at 00:36

## 2021-12-07 RX ADMIN — SODIUM CHLORIDE, POTASSIUM CHLORIDE, SODIUM LACTATE AND CALCIUM CHLORIDE: 600; 310; 30; 20 INJECTION, SOLUTION INTRAVENOUS at 00:30

## 2021-12-07 RX ADMIN — SODIUM BICARBONATE 650 MG TABLET 650 MG: at 09:16

## 2021-12-07 RX ADMIN — ALBUTEROL SULFATE 2.5 MG: 2.5 SOLUTION RESPIRATORY (INHALATION) at 16:23

## 2021-12-07 RX ADMIN — BRIVARACETAM 100 MG: 50 INJECTION, SUSPENSION INTRAVENOUS at 09:15

## 2021-12-07 RX ADMIN — LEVOTHYROXINE SODIUM 75 MCG: 20 INJECTION, SOLUTION INTRAVENOUS at 09:15

## 2021-12-07 RX ADMIN — HYDROCORTISONE 5 MG: 5 TABLET ORAL at 18:34

## 2021-12-07 ASSESSMENT — ACTIVITIES OF DAILY LIVING (ADL)
ADLS_ACUITY_SCORE: 20
ADLS_ACUITY_SCORE: 22
ADLS_ACUITY_SCORE: 20
ADLS_ACUITY_SCORE: 22
ADLS_ACUITY_SCORE: 20
ADLS_ACUITY_SCORE: 22
ADLS_ACUITY_SCORE: 20
ADLS_ACUITY_SCORE: 22
ADLS_ACUITY_SCORE: 20
ADLS_ACUITY_SCORE: 22
ADLS_ACUITY_SCORE: 22
ADLS_ACUITY_SCORE: 20
ADLS_ACUITY_SCORE: 22
ADLS_ACUITY_SCORE: 22
ADLS_ACUITY_SCORE: 20
ADLS_ACUITY_SCORE: 22

## 2021-12-07 NOTE — PLAN OF CARE
[id number]  Pt is nonverbal at baseline from hx of TBI, nonambulatory as well. VSS on RA. X2 PIV. Ns@100ml/hr with intermittent abx. Total care, T/R q2hr. Redness noted on admission on coccyx, mepilex placed. Assistive splint on R forearm, painful with movement. Contact for MRSA and VRE. Gtube in place. Lerma in place with good output. No BM this shift. NPO. Oral cares needed. Discharge pending. Continue to monitor.

## 2021-12-07 NOTE — PROVIDER NOTIFICATION
DO Galan @ 16:32 on 12/7/21--Resume PTA Levothyroxine 150 mcg daily tomorrow 11/8/21.  Domenica Yi [facility]

## 2021-12-07 NOTE — PROGRESS NOTES
[facility]    Medicine Progress Note       Date of Admission:  12/5/2021    Assessment & Plan                   Diet: NPO for Medical/Clinical Reasons Except for: No Exceptions  Adult Formula Drip Feeding: Continuous Jevity 1.5; Jejunostomy; Goal Rate: 55; mL/hr; Medication - Feeding Tube Flush Frequency: At least 15-30 mL water before and after medication administration and with tube clogging    DVT Prophylaxis: Enoxaparin (Lovenox) SQ  Lerma Catheter: PRESENT, indication: Strict 1-2 Hour I&O  Central Lines: None  Code Status: Full Code        Adarsh Youssef   Hospitalist Service  [facility]  Securely message with the Vocera Web Console (learn more here)  Text page via [employer]â€™s Paging/Directory    ______________________________________________________________________    Interval History   On examination this morning the patient was found to be nonverbal but would open eyes on command he was responsive to minimal stimulus oxygen saturations were good and will continue on the current pulmonary regime.  Impression he continues to have an aspiration pneumonia which is being treated  His tube feedings were reinstituted and was given 250 mL of free water flush for his hypernatremia.      Physical Exam   Vital Signs: Temp: 98.1  F (36.7  C) Temp src: Axillary BP: 91/53 Pulse: 103   Resp: 24 SpO2: 93 % O2 Device: Nasal cannula Oxygen Delivery: 2 LPM  Weight: 161 lbs 9.55 oz  Cardiovascular: Regular rate and rhythm, no murmurs  Respiratory: Clear to auscultation bilaterally, no wheezing  Gastrointestinal: Abdomen soft, non-tender, normal bowel sounds  Skin: No edema, cyanosis or erythema  Neurologic: CN 2-12 intact, normal strength and sensation    Data   Recent Labs   Lab 12/07/21  0455 12/05/21 2227 12/05/21 2226   WBC 10.3  --  11.9*   HGB 11.4*  --  15.7   MCV 96  --  93   *  --  187   INR  --   --  1.28*   *  --  139   POTASSIUM 3.8  --  4.5   CHLORIDE 116*   --  106   CO2 27  --  27   BUN 10  --  16   CR 0.90  --  1.24   ANIONGAP 2*  --  6   STEVE 8.3*  --  8.8   * 122* 122*   ALBUMIN 2.3*  --  3.2*   PROTTOTAL 6.6*  --  8.0   BILITOTAL 0.6  --  0.7   ALKPHOS 66  --  109   ALT 22  --  24   AST 37  --  22   LIPASE  --   --  529*       No results found for this or any previous visit (from the past 24 hour(s)).  Medications     dextrose       lactated ringers 100 mL/hr at 12/07/21 0200       albuterol  2.5 mg Nebulization Q4H While awake     Brivaracetam  100 mg Intravenous BID     calcium carbonate  1,250 mg Oral or G tube BID     carBAMazepine  100 mg Oral or G tube Daily     carBAMazepine  150 mg Oral or Feeding Tube TID     hydrocortisone sodium succinate PF  50 mg Intravenous Q8H     levothyroxine  75 mcg Intravenous Daily     pantoprazole (PROTONIX) IV  40 mg Intravenous Daily with breakfast     piperacillin-tazobactam  3.375 g Intravenous Q6H     sodium bicarbonate  650 mg Oral Daily     sodium chloride (PF)  3 mL Intracatheter Q8H     sodium chloride (PF)  3 mL Intracatheter Q8H     vancomycin  1,250 mg Intravenous Q24H     vitamin C  1,000 mg Oral or Feeding Tube Daily     vitamin D3  50 mcg Per Feeding Tube Daily

## 2021-12-07 NOTE — PROGRESS NOTES
Mr. Farias had been on Bipap during dayshift.  Frequently through the day and beginning of the night shift, he would disconnect himself from the Bipap.  After consulting with Dr. Francis, a ttrial without the Bipap was initiated.    After 0100, patient was on room air (except while sleeping after 0400).  While sleeping, patient was on 2L O2 via NC.  Patient was calm and cooperative after the Bipap mask was removed.      GJ tube flushed very easily after meds pushed.  Both the gastric and jejunum port flushed easily.

## 2021-12-07 NOTE — PROGRESS NOTES
CLINICAL NUTRITION SERVICES  -  ASSESSMENT NOTE      Future/Additional Recommendations:   Recommend consider adding 1 packet Nutrisource fiber per day = 15 kcals, 4 gm fiber - prn   Malnutrition:   % Weight Loss:  Weight loss does not meet criteria for malnutrition   % Intake:  Decreased intake does not meet criteria for malnutrition   Subcutaneous Fat Loss:  Deferred, suspect thin appearing with no wasting  Muscle Loss:  Deferred, suspect thin appearing with no wasting  Fluid Retention:  None noted    Malnutrition Diagnosis: Patient does not meet two of the above criteria necessary for diagnosing malnutrition        REASON FOR ASSESSMENT  Keyon Farias is a 61 year old male seen by Registered Dietitian for Provider Ordered Tube Feeding (no Nutrition Consult ordered)       NUTRITION HISTORY  - Information obtained from chart.  Patient is well known to our service.    He lives at home with his mother Savannah who is his caregiver.  He relies solely on enteral nutrition to meet his nutrient and fluid needs.  His TF regimen in the past has been Jevity 1.5 at 55 mL/hr x 24 hrs  Free H20 2223-2153 mL  Benefiber 1 scoop per day (but note not listed on Pharmacy medication list this admit)    CURRENT NUTRITION ORDERS  Diet Order:     NPO     Nutrition Support Enteral:  Type of Feeding Tube: Jejunostomy port of GJ feeding tube  Enteral Frequency:  Continuous  Enteral Regimen: Jevity 1.5 at 55 mL/hr  Total Enteral Provisions: 1980 kcals (27 kcal/kg), 84 g PRO (1.2 g/kg), 1003 ml free H20, 284 g CHO, and 27 g fiber daily.  Free Water Flush: 200 mL every 4 hrs (1200 mL)  Total Fluid (TF + flushes):  2203 mL (31 mL/kg)    Current Intake/Tolerance:  TF has been on hold since admission x 2 days with TF just starting today.      NUTRITION FOCUSED PHYSICAL ASSESSMENT FOR DIAGNOSING MALNUTRITION)  No:  Deferred           Obtained from Chart/Interdisciplinary Team:  None noted    ANTHROPOMETRICS  Height: 5 ft 11.5 in  Admit Wt:  71.1 kg  (12/5)  Current Wt:  73.3 kg  Body mass index is 21.6 kg/m .  Weight Status:  Normal BMI  IBW: 79.5 kg  % IBW: 89%  Weight History: Over past year, UBW range = 71-80 kg.  His admit wt was at lower end of range.    Wt Readings from Last 10 Encounters:   12/07/21 73.3 kg (161 lb 9.6 oz)   11/17/21 73.6 kg (162 lb 3.2 oz)   10/30/21 75.2 kg (165 lb 12.6 oz)   08/10/21 78.8 kg (173 lb 11.6 oz)   05/24/21 80.1 kg (176 lb 8 oz)   04/16/21 76 kg (167 lb 8.8 oz)   02/22/21 74.8 kg (165 lb)   01/18/21 74.2 kg (163 lb 9.3 oz)   12/20/20 77.2 kg (170 lb 1.6 oz)   11/04/20 71 kg (156 lb 8.4 oz)       LABS  Labs reviewed  Na 145 (H)  Glu 159 (H)    MEDICATIONS  Medications reviewed  Vitamin C, Vitamin D - for supplementation  LR at 100 mL/hr    ASSESSED NUTRITION NEEDS PER APPROVED PRACTICE GUIDELINES:    Dosing Weight:  71.1 kg (12/5)   Estimated Energy Needs: 5889-0577 kcals (25-30 Kcal/Kg)  Justification: maintenance  Estimated Protein Needs:  grams protein (1.2-1.5 g pro/Kg)  Justification: hypercatabolism with acute illness and preservation of lean body mass  Estimated Fluid Needs: 5504-4701 mL (1 mL/Kcal)  Justification: maintenance    MALNUTRITION:  % Weight Loss:  Weight loss does not meet criteria for malnutrition   % Intake:  Decreased intake does not meet criteria for malnutrition   Subcutaneous Fat Loss:  Deferred, suspect thin appearing with no wasting  Muscle Loss:  Deferred, suspect thin appearing with no wasting  Fluid Retention:  None noted    Malnutrition Diagnosis: Patient does not meet two of the above criteria necessary for diagnosing malnutrition    NUTRITION DIAGNOSIS:  No nutrition diagnosis identified at this time    NUTRITION INTERVENTIONS  Recommendations / Nutrition Prescription  Continue TF as above    Recommend consider adding 1 packet Nutrisource fiber per day = 15 kcals, 4 gm fiber - prn    Implementation  Nutrition education: Not appropriate at this time due to patient  condition    Nutrition Goals  TF will meet % estimated needs    MONITORING AND EVALUATION:  Progress towards goals will be monitored and evaluated per protocol and Practice Guidelines    Lisa Garcia, YONIS, LD, CNSC

## 2021-12-07 NOTE — PROGRESS NOTES
[facility]  Hospitalist Progress Note        Helio Galan MD   12/07/2021        Interval History:        - weaned off of Bipap to nasal cannula  - T max 102.7, fever trending down, WBC 11.9--->10.3, lactic acid 4.4---3.8, pro jamel 0.21  -blood cultures from 12/5 with no growth so far  -COVID negative  -CT chest on admission noted with no PE, noted bilateral lower lobe infiltrates consistent with pneumonitis         Assessment and Plan:      Keyon Farias is a 61 year old male with history of TBI with aphasia, R sided spastic hemiplegia and dysphagia with GJ-tube for TFs/meds; seizure disorder; panhypopituitarism; and frequent hospital admissions for recurrent pneumonia (most recently 11/12-11/17/2021; with 7 hospitalizations so far 2021 mostly for asp pneumonia); who presented 12/5/2021 with fever and AMS and was found with evidence of pneumonia as well as free intraperitoneal air.     Recurrent aspiration pneumonia with acute hypoxic respiratory failure and sepsis.  * Multiple hospitalizations for aspiration pneumonia, last in 11/2021. Has chronic GJ-tube.  * Presented 12/5 with fevers and AMS. Noted to be hypoxic by EMS. On initial evaluation 12/5 was febrile to 102.7, tachycardic, tachypneic, sats in 90's on O2; WBC 11.9, lactate 3.4, procalcitonin 0.21, d-dimer 1.9. CT chest 12/6 showed no PE: prominent bilateral lower lobe infiltrates compatible with pneumonitis; also extraluminal air (see below). Required BiPAP in the ED. Started on pip-tazo and vanco 12/5  -clinically improving, fever trending down WBC 11.9--->10.3, lactic acid 4.4---3.8, pro jamel 0.21  - blood cultures from 12/5 with no growth so far  - will continue vancomycin and Zosyn for now, can probably discontinue vancomycin on 12/8  - will resume PTA Mucomyst with albuterol nebs 4 times daily  - wean oxygen as able     Intraperitoneal free air/gas, possibly related to recent GJ-tube exchange.  * CT chest 12/6 also showed  extraluminal air is seen in the right upper quadrant around the colon, noted this was of indeterminate etiology but concerning for potential bowel perforation.   - CT abdomen and pelvis 12/6 showed small amount of free intraperitoneal gas as well as extraluminal gas in the fat of the abdomen; noted that this extraluminal gas appeared concentrated in the right upper quadrant near the cecum and ascending colon; no specific bowel abnormality was evident. Evaluated by CRS in the ED and abdomen was benign and felt that CT findings could be related to recent GJ-tube replacement (11/16).   - abdomen benign; clinically stable; will resume Tube feeds 12/7    TBI with aphasia, dysphagia, and R sided spastic hemiplegia.   Dysphagia with GJ-tube for TFs.  * Patient's mother is his caregiver, along with home care attendants. Has frequent hospitalization for recurrent pneumonias.   * GJ-tube replaced 11/16.  * Issues with free intraperitoneal air/gas as above. TF's held on admit--resumed 12/7  - Hold PTA scheduled metoclopramide and scopolamine for now given above--can probably resume 12/8 if clinically stable.     Seizure disorder.  Secondary to TBI. 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     GERD.  - will switch IV pantoprazole to PTA PO through feeding tube         COVID-19 testing: negative on admission 12/05; will discontinue special precautions.        Diet:   Tube feeds resumed 12/7  Orders Placed This Encounter      NPO for Medical/Clinical Reasons Except for: No Exceptions    Prophylaxis: PCD's, ambulation.     Lerma Catheter: PRESENT, indication: Strict 1-2 Hour I&O; can probably discontinue in 1-2 days    Code  Status: Prior    Disposition Plan     Expected discharge: 2-3d recommended to prior living arrangement pending clinical improvement.      Clinically Significant Risk Factors Present on Admission                Page Me (7 am to 6 pm)    Care plan discussed with nursing and patient's mother Savannah over the phone              Physical Exam:      Blood pressure 107/63, pulse 76, temperature 98.1  F (36.7  C), temperature source Axillary, resp. rate 21, weight 73.3 kg (161 lb 9.6 oz), SpO2 93 %.  Vitals:    12/05/21 2220 12/07/21 0500   Weight: 71.1 kg (156 lb 12 oz) 73.3 kg (161 lb 9.6 oz)     Vital Signs with Ranges  Temp:  [98.1  F (36.7  C)-99.1  F (37.3  C)] 98.1  F (36.7  C)  Pulse:  [] 76  Resp:  [9-25] 21  BP: ()/(48-72) 107/63  FiO2 (%):  [1 %-40 %] 1 %  SpO2:  [93 %-100 %] 93 %  I/O's Last 24 hours  I/O last 3 completed shifts:  In: 1360 [I.V.:1120; NG/GT:240]  Out: 3550 [Urine:3550]    Constitutional: Alert, awake , non-verbal; sick looking appearance   HEENT: Pupils equal and reactive to light and accomodation, neck supple    Oral cavity: Moist mucosa   Cardiovascular: Normal s1 s2, regular rate and rhythm, no murmur   Lungs: B/l clear to auscultation, no wheezes or crepitations   Abdomen: Soft, nt, nd, no guarding, rigidity or rebound; BS +; G tube in place   LE : No edema   Musculoskeletal: Unable to assess at this time   Neuro: Non-verbal at baseline                 Medications:          acetylcysteine  2 mL Nebulization 4x Daily     albuterol  2.5 mg Nebulization Q4H While awake     aspirin  81 mg Oral or Feeding Tube Daily     Brivaracetam  100 mg Intravenous BID     calcium carbonate  1,250 mg Oral or G tube BID     carBAMazepine  100 mg Oral or G tube Daily     carBAMazepine  150 mg Oral or Feeding Tube TID     levothyroxine  75 mcg Intravenous Daily     metoclopramide  10 mg Oral 4x Daily AC & HS     pantoprazole (PROTONIX) IV  40 mg Intravenous Daily with breakfast      piperacillin-tazobactam  3.375 g Intravenous Q6H     potassium & sodium phosphates  1 packet Oral TID     sodium bicarbonate  650 mg Oral Daily     sodium chloride (PF)  3 mL Intracatheter Q8H     sodium chloride (PF)  3 mL Intracatheter Q8H     vancomycin  1,250 mg Intravenous Q24H     vitamin C  1,000 mg Oral or Feeding Tube Daily     vitamin D3  50 mcg Per Feeding Tube Daily     PRN Meds: acetaminophen, dextrose, lidocaine 4%, lidocaine (buffered or not buffered), melatonin, ondansetron **OR** ondansetron, sodium chloride (PF), sodium chloride (PF)         Data:      All new lab and imaging data was reviewed.   Recent Labs   Lab Test 12/07/21 0455 12/05/21 2226 11/24/21  0032 10/27/20  0331 10/26/20  0400 10/25/20  1345   WBC 10.3 11.9* 9.2   < > 17.0* 27.2*   HGB 11.4* 15.7 15.1   < > 9.8* 11.7*   MCV 96 93 93   < > 89 91   * 187 162   < > 147* 224   INR  --  1.28*  --   --  1.82* 1.86*    < > = values in this interval not displayed.      Recent Labs   Lab Test 12/07/21 0455 12/05/21 2227 12/05/21 2226 11/24/21  0032   *  --  139 140   POTASSIUM 3.8  --  4.5 3.8   CHLORIDE 116*  --  106 105   CO2 27  --  27 32   BUN 10  --  16 14   CR 0.90  --  1.24 0.96   ANIONGAP 2*  --  6 3   STEVE 8.3*  --  8.8 8.7   * 122* 122* 117*     Recent Labs   Lab Test 04/15/21  2250 02/19/21  1123 10/25/20  2100   TROPI <0.015 <0.015 0.047*

## 2021-12-07 NOTE — PROGRESS NOTES
"MD Notification    Notified Person: MD    Notified Person Name: Dr. Motta     Notification Date/Time: 12/6/21    Notification Interaction: Web page    Purpose of Notification: \"Pt is getting more restless with BiPAP on. Pt is satting 94%+ when pulling mask off. Any reason we can't attempt to wean pt off of BiPAP onto NC? Thanks.\"    Orders Received: If pt can tolerate off BiPAP ok to be taken off    Comments:    "

## 2021-12-07 NOTE — PROGRESS NOTES
[facility] Nurse Inpatient Assessment   Reason for consultation: Evaluate and treat suspected buttocks/sacrum and RT IT  PI - POA  Assessment     Midline coccyx, bilateral buttocks:   Area likey secondary to MASD with possible  pressure component.        -No local s/s infection      Rt IT: Friction vs Pressure component likely seodary to brief.          .      Treatment Plan  Wound care; bilateral buttocks; sacrum and Rt IT area  -change dressing on even days and prn   - Clean area with wound cleanser (MircoKlenz) Pat dry  -3M NO Sting Skin prep to area. Let dry  -Apply Mepilex sacral to buttocks  PIP measures:   -Turn every 2 hours and prn  -HOB @ or below 30 degrees if not medically contraindicated  - Heel suspension in BED and pillow between knees with turning   -Dietitian: resume TF when medically indicated  -Briefs off or loose in bed for FIC. Lerma for UIC  -Mobilize when medically indicated  - Ricci Risk: document all interventions  -Assess for pulsate  -Full skin inspections especailly  under all devices and folds  Jessica PEG tube tissue  Standard cares with placing just one fenestrated 4x4 gauze and taped on one side to secure, time and date    Recommended provider order: n/a   Orders written  [facility] Nurse follow-up plan: weekly     Patient History    According to provider note(s):      Keyon Farias is a 61 year old male with history including TBI with aphasia, R sided spastic hemiplegia and dysphagia with GJ-tube for TFs/meds; seizure disorder; panhypopituitarism; and frequent hospital admissions for recurrent pneumonia (most recently 11/12-11/17/2021; with 7 hospitalizations so far 2021 mostly for asp pneumonia); who presented 12/5/2021 with fever and AMS and was found with evidence of pneumonia as well as free intraperitoneal air.       Objective Data    Containment of urine/stool: Incontinence Protocol, Brief and Incontinent pad in bed  Active Diet Order:  Orders Placed This  Encounter      NPO for Medical/Clinical Reasons Except for: No Exceptions    Labs:   Recent Labs   Lab 12/07/21  0455 12/05/21  2226   ALBUMIN 2.3* 3.2*   HGB 11.4* 15.7   INR  --  1.28*   WBC 10.3 11.9*     Output:   I/O last 3 completed shifts:  In: 1360 [I.V.:1120; NG/GT:240]  Out: 3550 [Urine:3550]  Risk Assessment:   Sensory Perception: 3-->slightly limited  Moisture: 3-->occasionally moist  Activity: 1-->bedfast  Mobility: 3-->slightly limited  Nutrition: 2-->probably inadequate  Friction and Shear: 2-->potential problem  Ricci Score: 14    Physical Exam    Midline coccyx/buttocks and Rt IT area  Wound / skin history: all POA  [facility] Photo date: 12-7-21            Unusual presentation of bilateral buttocks and Rt IT area.     Scattered areas of descrete wounds with red to ecchymotic hues, skin intact to scabs, no open skin, no drainage, Slight blanchable ecchymosis ot Rt coccyx.   Rt IT with linear demarcation of again light blanchable  ecchymotic hues, skin intact, no drainage, no odor   Interventions  Current support surface: Standard  Atmos Air mattress,  Monitor need for pulsate  Current off-loading measures: Pillows under calves and Pillows,   Visual inspection of wound(s) completed with the Resource RN  Wound Care:Mepilex Sacral to area   Supplies: reviewed  Education provided: Not appropriate today   Discussed plan of care with  RN    Mirna Conley, RN  CWOCN

## 2021-12-08 LAB
ANION GAP SERPL CALCULATED.3IONS-SCNC: 4 MMOL/L (ref 3–14)
BASOPHILS # BLD AUTO: 0 10E3/UL (ref 0–0.2)
BASOPHILS NFR BLD AUTO: 1 %
BUN SERPL-MCNC: 9 MG/DL (ref 7–30)
CALCIUM SERPL-MCNC: 7.4 MG/DL (ref 8.5–10.1)
CHLORIDE BLD-SCNC: 108 MMOL/L (ref 94–109)
CO2 SERPL-SCNC: 28 MMOL/L (ref 20–32)
CREAT SERPL-MCNC: 0.79 MG/DL (ref 0.66–1.25)
EOSINOPHIL # BLD AUTO: 0.3 10E3/UL (ref 0–0.7)
EOSINOPHIL NFR BLD AUTO: 6 %
ERYTHROCYTE [DISTWIDTH] IN BLOOD BY AUTOMATED COUNT: 14.1 % (ref 10–15)
GFR SERPL CREATININE-BSD FRML MDRD: >90 ML/MIN/1.73M2
GLUCOSE BLD-MCNC: 127 MG/DL (ref 70–99)
HCT VFR BLD AUTO: 32.4 % (ref 40–53)
HGB BLD-MCNC: 10.6 G/DL (ref 13.3–17.7)
IMM GRANULOCYTES # BLD: 0 10E3/UL
IMM GRANULOCYTES NFR BLD: 0 %
LYMPHOCYTES # BLD AUTO: 1.5 10E3/UL (ref 0.8–5.3)
LYMPHOCYTES NFR BLD AUTO: 27 %
MCH RBC QN AUTO: 30.6 PG (ref 26.5–33)
MCHC RBC AUTO-ENTMCNC: 32.7 G/DL (ref 31.5–36.5)
MCV RBC AUTO: 94 FL (ref 78–100)
MONOCYTES # BLD AUTO: 0.6 10E3/UL (ref 0–1.3)
MONOCYTES NFR BLD AUTO: 10 %
NEUTROPHILS # BLD AUTO: 3.1 10E3/UL (ref 1.6–8.3)
NEUTROPHILS NFR BLD AUTO: 56 %
NRBC # BLD AUTO: 0 10E3/UL
NRBC BLD AUTO-RTO: 0 /100
PLATELET # BLD AUTO: 100 10E3/UL (ref 150–450)
POTASSIUM BLD-SCNC: 4.9 MMOL/L (ref 3.4–5.3)
RBC # BLD AUTO: 3.46 10E6/UL (ref 4.4–5.9)
SODIUM SERPL-SCNC: 140 MMOL/L (ref 133–144)
WBC # BLD AUTO: 5.5 10E3/UL (ref 4–11)

## 2021-12-08 PROCEDURE — 94640 AIRWAY INHALATION TREATMENT: CPT

## 2021-12-08 PROCEDURE — 250N000013 HC RX MED GY IP 250 OP 250 PS 637: Performed by: HOSPITALIST

## 2021-12-08 PROCEDURE — 250N000009 HC RX 250: Performed by: INTERNAL MEDICINE

## 2021-12-08 PROCEDURE — 120N000001 HC R&B MED SURG/OB

## 2021-12-08 PROCEDURE — 999N000157 HC STATISTIC RCP TIME EA 10 MIN

## 2021-12-08 PROCEDURE — 99232 SBSQ HOSP IP/OBS MODERATE 35: CPT | Performed by: HOSPITALIST

## 2021-12-08 PROCEDURE — 99207 PR CDG-MDM COMPONENT: MEETS MODERATE - DOWN CODED: CPT | Performed by: HOSPITALIST

## 2021-12-08 PROCEDURE — 85049 AUTOMATED PLATELET COUNT: CPT | Performed by: HOSPITALIST

## 2021-12-08 PROCEDURE — 250N000013 HC RX MED GY IP 250 OP 250 PS 637: Performed by: INTERNAL MEDICINE

## 2021-12-08 PROCEDURE — 80048 BASIC METABOLIC PNL TOTAL CA: CPT | Performed by: HOSPITALIST

## 2021-12-08 PROCEDURE — 250N000011 HC RX IP 250 OP 636: Performed by: EMERGENCY MEDICINE

## 2021-12-08 PROCEDURE — 250N000009 HC RX 250: Performed by: HOSPITALIST

## 2021-12-08 PROCEDURE — 94640 AIRWAY INHALATION TREATMENT: CPT | Mod: 76

## 2021-12-08 PROCEDURE — 36415 COLL VENOUS BLD VENIPUNCTURE: CPT | Performed by: HOSPITALIST

## 2021-12-08 RX ORDER — SODIUM CHLORIDE, SODIUM LACTATE, POTASSIUM CHLORIDE, CALCIUM CHLORIDE 600; 310; 30; 20 MG/100ML; MG/100ML; MG/100ML; MG/100ML
INJECTION, SOLUTION INTRAVENOUS CONTINUOUS
Status: ACTIVE | OUTPATIENT
Start: 2021-12-08 | End: 2021-12-09

## 2021-12-08 RX ORDER — HYDROCORTISONE 5 MG/1
5 TABLET ORAL EVERY 24 HOURS
Status: DISCONTINUED | OUTPATIENT
Start: 2021-12-08 | End: 2021-12-13 | Stop reason: HOSPADM

## 2021-12-08 RX ADMIN — ALBUTEROL SULFATE 2.5 MG: 2.5 SOLUTION RESPIRATORY (INHALATION) at 07:03

## 2021-12-08 RX ADMIN — PIPERACILLIN AND TAZOBACTAM 3.38 G: 3; .375 INJECTION, POWDER, FOR SOLUTION INTRAVENOUS at 10:31

## 2021-12-08 RX ADMIN — CARBAMAZEPINE 150 MG: 100 SUSPENSION ORAL at 00:32

## 2021-12-08 RX ADMIN — SODIUM BICARBONATE 650 MG TABLET 650 MG: at 08:22

## 2021-12-08 RX ADMIN — LEVOTHYROXINE SODIUM 150 MCG: 150 TABLET ORAL at 06:26

## 2021-12-08 RX ADMIN — PIPERACILLIN AND TAZOBACTAM 3.38 G: 3; .375 INJECTION, POWDER, FOR SOLUTION INTRAVENOUS at 17:01

## 2021-12-08 RX ADMIN — METOCLOPRAMIDE HYDROCHLORIDE 10 MG: 5 SOLUTION ORAL at 17:01

## 2021-12-08 RX ADMIN — BRIVARACETAM 100 MG: 10 SOLUTION ORAL at 20:55

## 2021-12-08 RX ADMIN — CALCIUM CARBONATE 1250 MG: 1250 SUSPENSION ORAL at 20:55

## 2021-12-08 RX ADMIN — OXYCODONE HYDROCHLORIDE AND ACETAMINOPHEN 1000 MG: 500 TABLET ORAL at 08:13

## 2021-12-08 RX ADMIN — ASPIRIN 81 MG CHEWABLE TABLET 81 MG: 81 TABLET CHEWABLE at 08:12

## 2021-12-08 RX ADMIN — HYDROCORTISONE 5 MG: 5 TABLET ORAL at 17:01

## 2021-12-08 RX ADMIN — CARBAMAZEPINE 150 MG: 100 SUSPENSION ORAL at 06:24

## 2021-12-08 RX ADMIN — METOCLOPRAMIDE HYDROCHLORIDE 10 MG: 5 SOLUTION ORAL at 13:16

## 2021-12-08 RX ADMIN — BRIVARACETAM 100 MG: 10 SOLUTION ORAL at 09:26

## 2021-12-08 RX ADMIN — METOCLOPRAMIDE HYDROCHLORIDE 10 MG: 5 SOLUTION ORAL at 22:33

## 2021-12-08 RX ADMIN — ACETYLCYSTEINE 2 ML: 200 SOLUTION ORAL; RESPIRATORY (INHALATION) at 07:03

## 2021-12-08 RX ADMIN — POTASSIUM & SODIUM PHOSPHATES POWDER PACK 280-160-250 MG 1 PACKET: 280-160-250 PACK at 08:12

## 2021-12-08 RX ADMIN — HYDROCORTISONE 15 MG: 5 TABLET ORAL at 08:22

## 2021-12-08 RX ADMIN — Medication 50 MCG: at 08:12

## 2021-12-08 RX ADMIN — CARBAMAZEPINE 150 MG: 100 SUSPENSION ORAL at 23:32

## 2021-12-08 RX ADMIN — PIPERACILLIN AND TAZOBACTAM 3.38 G: 3; .375 INJECTION, POWDER, FOR SOLUTION INTRAVENOUS at 04:11

## 2021-12-08 RX ADMIN — POTASSIUM & SODIUM PHOSPHATES POWDER PACK 280-160-250 MG 1 PACKET: 280-160-250 PACK at 22:32

## 2021-12-08 RX ADMIN — CALCIUM CARBONATE 1250 MG: 1250 SUSPENSION ORAL at 08:21

## 2021-12-08 RX ADMIN — POTASSIUM & SODIUM PHOSPHATES POWDER PACK 280-160-250 MG 1 PACKET: 280-160-250 PACK at 17:01

## 2021-12-08 RX ADMIN — CARBAMAZEPINE 150 MG: 100 SUSPENSION ORAL at 14:15

## 2021-12-08 RX ADMIN — CARBAMAZEPINE 100 MG: 100 SUSPENSION ORAL at 08:30

## 2021-12-08 RX ADMIN — PIPERACILLIN AND TAZOBACTAM 3.38 G: 3; .375 INJECTION, POWDER, FOR SOLUTION INTRAVENOUS at 22:34

## 2021-12-08 RX ADMIN — Medication 40 MG: at 08:16

## 2021-12-08 ASSESSMENT — ACTIVITIES OF DAILY LIVING (ADL)
ADLS_ACUITY_SCORE: 35
ADLS_ACUITY_SCORE: 24
ADLS_ACUITY_SCORE: 24
ADLS_ACUITY_SCORE: 35
ADLS_ACUITY_SCORE: 24
ADLS_ACUITY_SCORE: 35
ADLS_ACUITY_SCORE: 24
ADLS_ACUITY_SCORE: 35
ADLS_ACUITY_SCORE: 24
ADLS_ACUITY_SCORE: 35
ADLS_ACUITY_SCORE: 35
ADLS_ACUITY_SCORE: 24
ADLS_ACUITY_SCORE: 22
ADLS_ACUITY_SCORE: 24
ADLS_ACUITY_SCORE: 24
ADLS_ACUITY_SCORE: 22
ADLS_ACUITY_SCORE: 35
ADLS_ACUITY_SCORE: 24
ADLS_ACUITY_SCORE: 22
ADLS_ACUITY_SCORE: 24

## 2021-12-08 NOTE — PROGRESS NOTES
[facility]  Hospitalist Progress Note        Helio Galan MD   12/08/2021        Interval History:        - Clinically much improved, mentation back to baseline   - oxygen weaned down to room air, afebrile for more than 24 hours  - tolerating tube feedings well, having some loose stools         Assessment and Plan:      Keyon Farias is a 61 year old male with history of TBI with aphasia, R sided spastic hemiplegia and dysphagia with GJ-tube for TFs/meds; seizure disorder; panhypopituitarism; and frequent hospital admissions for recurrent pneumonia (most recently 11/12-11/17/2021; with 7 hospitalizations so far 2021 mostly for asp pneumonia); who presented 12/5/2021 with fever and AMS and was found with evidence of pneumonia as well as free intraperitoneal air.     Recurrent aspiration pneumonia with acute hypoxic respiratory failure and sepsis.  * Multiple hospitalizations for aspiration pneumonia, last in 11/2021. Has chronic GJ-tube.  * Presented 12/5 with fevers and AMS. Noted to be hypoxic by EMS. On initial evaluation 12/5 was febrile to 102.7, tachycardic, tachypneic, sats in 90's on O2; WBC 11.9, lactate 3.4, procalcitonin 0.21, d-dimer 1.9. CT chest 12/6 showed no PE: prominent bilateral lower lobe infiltrates compatible with pneumonitis; also extraluminal air (see below). Required BiPAP in the ED. Started on pip-tazo and vanco 12/5  - clinically much improved, mentation back to baseline, fever trended down, afebrile since 12/7;  WBC 11.9--->10.3, lactic acid 4.4---3.8, pro jamel 0.21  - blood cultures from 12/5 with no growth so far  - will discontinue vancomycin 12/8; continue Zosyn-- can probably switch to PO Augmentin in 1-2 days  - will continue PTA Mucomyst with albuterol nebs 4 times daily  - initially requiring Bipap, oxygen now weaned down to room air     Intraperitoneal free air/gas, possibly related to recent GJ-tube exchange.  * CT chest 12/6 also showed extraluminal air is  seen in the right upper quadrant around the colon, noted this was of indeterminate etiology but concerning for potential bowel perforation.   - CT abdomen and pelvis 12/6 showed small amount of free intraperitoneal gas as well as extraluminal gas in the fat of the abdomen; noted that this extraluminal gas appeared concentrated in the right upper quadrant near the cecum and ascending colon; no specific bowel abnormality was evident.   - Evaluated by CRS in the ED and abdomen was benign and felt that CT findings could be related to recent GJ-tube replacement (11/16).   - abdomen benign; clinically stable; resumed Tube feeds 12/7 and tolerating well    TBI with aphasia, dysphagia, and R sided spastic hemiplegia.   Dysphagia with GJ-tube for TFs.  * Patient's mother is his caregiver, along with home care attendants. Has frequent hospitalization for recurrent pneumonias.   * GJ-tube replaced 11/16.  * Issues with free intraperitoneal air/gas as above. TF's held on admit--resumed 12/7  - will resume PTA scheduled metoclopramide .     Seizure disorder.  Secondary to TBI. 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     GERD.  - Continue PTA PO Protonix through feeding tube         COVID-19 testing: negative on admission 12/05; will discontinue special precautions.        Diet:   Tube feeds resumed 12/7  Orders Placed This Encounter      NPO for Medical/Clinical Reasons Except for: No Exceptions    Prophylaxis: PCD's, ambulation.     Aaron Catheter: was put on admission for intake/output; incontinent at [facility] and uses condom catheter; will switch aaron to condom catheter 12/8     Code Status: Prior    Disposition Plan     Expected discharge: 1-2 days  recommended to prior  living arrangement pending clinical stability; therapies in past have recommended TCU but his mother (guardian) wants to take him home.    Will discontinue IMC status; can transfer to regular medical floor      Clinically Significant Risk Factors Present on Admission                Page Me (7 am to 6 pm)    Care plan discussed with nursing, patient and his mother Savannah over the phone              Physical Exam:      Blood pressure 130/64, pulse 69, temperature 97.8  F (36.6  C), temperature source Oral, resp. rate 23, weight 74.2 kg (163 lb 9.3 oz), SpO2 97 %.  Vitals:    12/05/21 2220 12/07/21 0500 12/08/21 0600   Weight: 71.1 kg (156 lb 12 oz) 73.3 kg (161 lb 9.6 oz) 74.2 kg (163 lb 9.3 oz)     Vital Signs with Ranges  Temp:  [97.8  F (36.6  C)] 97.8  F (36.6  C)  Pulse:  [68-96] 69  Resp:  [9-25] 23  BP: ()/(55-73) 130/64  FiO2 (%):  [1 %] 1 %  SpO2:  [93 %-99 %] 97 %  I/O's Last 24 hours  I/O last 3 completed shifts:  In: 2872 [I.V.:1100; NG/GT:1100]  Out: 1700 [Urine:1700]    Constitutional: Alert, awake , non-verbal; much more cheerful, answers with thumbs up/down   HEENT: Pupils equal and reactive to light and accomodation, neck supple    Oral cavity: Moist mucosa   Cardiovascular: Normal s1 s2, regular rate and rhythm, no murmur   Lungs: B/l clear to auscultation, no wheezes or crepitations   Abdomen: Soft, nt, nd, no guarding, rigidity or rebound; BS +; G tube in place   LE : No edema   Musculoskeletal: Moving all extremities equally   Neuro: Non-verbal at baseline                 Medications:          acetylcysteine  2 mL Nebulization 4x daily     albuterol  2.5 mg Nebulization Q4H While awake     aspirin  81 mg Oral or Feeding Tube Daily     Brivaracetam  100 mg Oral or Feeding Tube BID     calcium carbonate  1,250 mg Oral or G tube BID     carBAMazepine  100 mg Oral or G tube Daily     carBAMazepine  150 mg Oral or Feeding Tube TID     hydrocortisone  15 mg Per Feeding Tube Daily      hydrocortisone  5 mg Oral Daily     levothyroxine  150 mcg Oral QAM AC     metoclopramide  10 mg Oral 4x Daily AC & HS     pantoprazole  40 mg Per J Tube Daily     piperacillin-tazobactam  3.375 g Intravenous Q6H     potassium & sodium phosphates  1 packet Oral TID     sodium bicarbonate  650 mg Oral Daily     sodium chloride (PF)  3 mL Intracatheter Q8H     sodium chloride (PF)  3 mL Intracatheter Q8H     vitamin C  1,000 mg Oral or Feeding Tube Daily     vitamin D3  50 mcg Per Feeding Tube Daily     PRN Meds: acetaminophen, dextrose, lidocaine 4%, lidocaine (buffered or not buffered), melatonin, ondansetron **OR** ondansetron, sodium chloride (PF), sodium chloride (PF)         Data:      All new lab and imaging data was reviewed.   Recent Labs   Lab Test 12/07/21 0455 12/05/21 2226 11/24/21  0032 10/27/20  0331 10/26/20  0400 10/25/20  1345   WBC 10.3 11.9* 9.2   < > 17.0* 27.2*   HGB 11.4* 15.7 15.1   < > 9.8* 11.7*   MCV 96 93 93   < > 89 91   * 187 162   < > 147* 224   INR  --  1.28*  --   --  1.82* 1.86*    < > = values in this interval not displayed.      Recent Labs   Lab Test 12/07/21 0455 12/05/21 2227 12/05/21 2226 11/24/21  0032   *  --  139 140   POTASSIUM 3.8  --  4.5 3.8   CHLORIDE 116*  --  106 105   CO2 27  --  27 32   BUN 10  --  16 14   CR 0.90  --  1.24 0.96   ANIONGAP 2*  --  6 3   STEVE 8.3*  --  8.8 8.7   * 122* 122* 117*     Recent Labs   Lab Test 04/15/21  2250 02/19/21  1123 10/25/20  2100   TROPI <0.015 <0.015 0.047*

## 2021-12-08 NOTE — PROGRESS NOTES
Patient had a great evening overnight.  Patient remained on room air throughout with no desat events.  Patient tolerated the tube feeds with no challenges.  Patient had one very large bowel movement

## 2021-12-08 NOTE — PROGRESS NOTES
[facility]    Medicine Progress Note       Date of Admission:  12/5/2021                    Diet: NPO for Medical/Clinical Reasons Except for: No Exceptions  Adult Formula Drip Feeding: Continuous Jevity 1.5; Jejunostomy; Goal Rate: 55; mL/hr; Medication - Feeding Tube Flush Frequency: At least 15-30 mL water before and after medication administration and with tube clogging    DVT Prophylaxis: Enoxaparin (Lovenox) SQ  Lerma Catheter: PRESENT, indication: Strict 1-2 Hour I&O  Central Lines: None  Code Status: Full Code        Adarsh Youssef   Hospitalist Service  [facility]  Securely message with the Vocera Web Console (learn more here)  Text page via dotloop Paging/Directory    ______________________________________________________________________    Interval History   On visiting the patient today he was found to be more alert.  He was responsive to voice although he has long-term nonverbal.  His being nonverbal is such that he cannot give a history or give answers to questions.  His vital signs remained stable and he has been afebrile.  His lungs are clear and his heart has regular rate.  There is no significant extremity swelling.  He is off oxygen and maintaining his oxygen saturations.  The only complaint is he continues to have some diarrhea.  Significant lab in the last 24 hours is his culture came back with MRSA.  Impression is he continues to do well and can probably be transferred to a lower level of care that is a general medical schaefer.    Physical Exam   Vital Signs: Temp: 97.8  F (36.6  C) Temp src: Oral BP: 130/64 Pulse: 69   Resp: 23 SpO2: 97 % O2 Device: None (Room air)    Weight: 163 lbs 9.3 oz  Cardiovascular: Regular rate and rhythm, no murmurs  Respiratory: Clear to auscultation bilaterally, no wheezing  Gastrointestinal: Abdomen soft, non-tender, normal bowel sounds  Skin: No edema, cyanosis or erythema  Neurologic: CN 2-12 intact, normal strength  and sensation    Data   Recent Labs   Lab 12/07/21  0455 12/05/21  2227 12/05/21 2226   WBC 10.3  --  11.9*   HGB 11.4*  --  15.7   MCV 96  --  93   *  --  187   INR  --   --  1.28*   *  --  139   POTASSIUM 3.8  --  4.5   CHLORIDE 116*  --  106   CO2 27  --  27   BUN 10  --  16   CR 0.90  --  1.24   ANIONGAP 2*  --  6   STEVE 8.3*  --  8.8   * 122* 122*   ALBUMIN 2.3*  --  3.2*   PROTTOTAL 6.6*  --  8.0   BILITOTAL 0.6  --  0.7   ALKPHOS 66  --  109   ALT 22  --  24   AST 37  --  22   LIPASE  --   --  529*       No results found for this or any previous visit (from the past 24 hour(s)).  Medications     dextrose       lactated ringers 100 mL/hr at 12/08/21 0400       acetylcysteine  2 mL Nebulization 4x daily     albuterol  2.5 mg Nebulization Q4H While awake     aspirin  81 mg Oral or Feeding Tube Daily     Brivaracetam  100 mg Oral or Feeding Tube BID     calcium carbonate  1,250 mg Oral or G tube BID     carBAMazepine  100 mg Oral or G tube Daily     carBAMazepine  150 mg Oral or Feeding Tube TID     hydrocortisone  15 mg Per Feeding Tube Daily     hydrocortisone  5 mg Oral Daily     levothyroxine  150 mcg Oral QAM AC     [Held by provider] metoclopramide  10 mg Oral 4x Daily AC & HS     pantoprazole  40 mg Per J Tube Daily     piperacillin-tazobactam  3.375 g Intravenous Q6H     potassium & sodium phosphates  1 packet Oral TID     sodium bicarbonate  650 mg Oral Daily     sodium chloride (PF)  3 mL Intracatheter Q8H     sodium chloride (PF)  3 mL Intracatheter Q8H     vancomycin  1,250 mg Intravenous Q24H     vitamin C  1,000 mg Oral or Feeding Tube Daily     vitamin D3  50 mcg Per Feeding Tube Daily

## 2021-12-08 NOTE — PLAN OF CARE
Pt transferred from [facility] at 1430. VSS. RA. Pt appears to be at baseline mentation.T.F at goal rate. Log rolled, laying on side now. Continue NPO. Mother aware of transfer. Plan to remove aaron and replace with condom cath. Plan to discharge home soon. No or open briefs due to skin  breakdown, incontinence pad placed underneath patient.

## 2021-12-08 NOTE — PLAN OF CARE
Pt slept until afternoon. Is cheerful after visit from Mom. VS remain stable. On room air and maintaining O2 Sats; good ventilatory effort. G/J tube dressing changed. J-tube feeding started at 55 ml/hr w/200 ml free water flush Q 4H. Wound care consult made, visit this afternoon. Has had 2 loose BM. Lerma care complete, output good. MRSA swab obtained and resulted POSITIVE.

## 2021-12-09 LAB — GLUCOSE BLDC GLUCOMTR-MCNC: 100 MG/DL (ref 70–99)

## 2021-12-09 PROCEDURE — 258N000003 HC RX IP 258 OP 636: Performed by: HOSPITALIST

## 2021-12-09 PROCEDURE — 999N000157 HC STATISTIC RCP TIME EA 10 MIN

## 2021-12-09 PROCEDURE — 94640 AIRWAY INHALATION TREATMENT: CPT | Mod: 76

## 2021-12-09 PROCEDURE — 250N000013 HC RX MED GY IP 250 OP 250 PS 637: Performed by: INTERNAL MEDICINE

## 2021-12-09 PROCEDURE — 99233 SBSQ HOSP IP/OBS HIGH 50: CPT | Performed by: HOSPITALIST

## 2021-12-09 PROCEDURE — 94640 AIRWAY INHALATION TREATMENT: CPT

## 2021-12-09 PROCEDURE — 250N000013 HC RX MED GY IP 250 OP 250 PS 637: Performed by: HOSPITALIST

## 2021-12-09 PROCEDURE — 250N000009 HC RX 250: Performed by: HOSPITALIST

## 2021-12-09 PROCEDURE — 250N000009 HC RX 250: Performed by: INTERNAL MEDICINE

## 2021-12-09 PROCEDURE — 250N000011 HC RX IP 250 OP 636: Performed by: EMERGENCY MEDICINE

## 2021-12-09 PROCEDURE — 120N000001 HC R&B MED SURG/OB

## 2021-12-09 RX ORDER — AMOXICILLIN AND CLAVULANATE POTASSIUM 400; 57 MG/5ML; MG/5ML
875 POWDER, FOR SUSPENSION ORAL 2 TIMES DAILY
Status: DISCONTINUED | OUTPATIENT
Start: 2021-12-09 | End: 2021-12-13 | Stop reason: HOSPADM

## 2021-12-09 RX ADMIN — POTASSIUM & SODIUM PHOSPHATES POWDER PACK 280-160-250 MG 1 PACKET: 280-160-250 PACK at 08:17

## 2021-12-09 RX ADMIN — ALBUTEROL SULFATE 2.5 MG: 2.5 SOLUTION RESPIRATORY (INHALATION) at 01:34

## 2021-12-09 RX ADMIN — ACETYLCYSTEINE 2 ML: 200 SOLUTION ORAL; RESPIRATORY (INHALATION) at 07:34

## 2021-12-09 RX ADMIN — ALBUTEROL SULFATE 2.5 MG: 2.5 SOLUTION RESPIRATORY (INHALATION) at 11:58

## 2021-12-09 RX ADMIN — PIPERACILLIN AND TAZOBACTAM 3.38 G: 3; .375 INJECTION, POWDER, FOR SOLUTION INTRAVENOUS at 04:15

## 2021-12-09 RX ADMIN — POTASSIUM & SODIUM PHOSPHATES POWDER PACK 280-160-250 MG 1 PACKET: 280-160-250 PACK at 16:10

## 2021-12-09 RX ADMIN — AMOXICILLIN AND CLAVULANATE POTASSIUM 875 MG: 400; 57 POWDER, FOR SUSPENSION ORAL at 09:43

## 2021-12-09 RX ADMIN — METOCLOPRAMIDE HYDROCHLORIDE 10 MG: 5 SOLUTION ORAL at 16:10

## 2021-12-09 RX ADMIN — AMOXICILLIN AND CLAVULANATE POTASSIUM 875 MG: 400; 57 POWDER, FOR SUSPENSION ORAL at 21:53

## 2021-12-09 RX ADMIN — POTASSIUM & SODIUM PHOSPHATES POWDER PACK 280-160-250 MG 1 PACKET: 280-160-250 PACK at 21:53

## 2021-12-09 RX ADMIN — HYDROCORTISONE 5 MG: 5 TABLET ORAL at 14:55

## 2021-12-09 RX ADMIN — ALBUTEROL SULFATE 2.5 MG: 2.5 SOLUTION RESPIRATORY (INHALATION) at 20:17

## 2021-12-09 RX ADMIN — OXYCODONE HYDROCHLORIDE AND ACETAMINOPHEN 1000 MG: 500 TABLET ORAL at 08:18

## 2021-12-09 RX ADMIN — CALCIUM CARBONATE 1250 MG: 1250 SUSPENSION ORAL at 21:52

## 2021-12-09 RX ADMIN — Medication 50 MCG: at 08:18

## 2021-12-09 RX ADMIN — CARBAMAZEPINE 150 MG: 100 SUSPENSION ORAL at 12:31

## 2021-12-09 RX ADMIN — METOCLOPRAMIDE HYDROCHLORIDE 10 MG: 5 SOLUTION ORAL at 12:31

## 2021-12-09 RX ADMIN — ACETYLCYSTEINE 2 ML: 200 SOLUTION ORAL; RESPIRATORY (INHALATION) at 20:17

## 2021-12-09 RX ADMIN — ACETYLCYSTEINE 2 ML: 200 SOLUTION ORAL; RESPIRATORY (INHALATION) at 12:04

## 2021-12-09 RX ADMIN — CALCIUM CARBONATE 1250 MG: 1250 SUSPENSION ORAL at 08:19

## 2021-12-09 RX ADMIN — BRIVARACETAM 100 MG: 10 SOLUTION ORAL at 09:43

## 2021-12-09 RX ADMIN — LEVOTHYROXINE SODIUM 150 MCG: 150 TABLET ORAL at 07:00

## 2021-12-09 RX ADMIN — BRIVARACETAM 100 MG: 10 SOLUTION ORAL at 21:53

## 2021-12-09 RX ADMIN — Medication 40 MG: at 08:19

## 2021-12-09 RX ADMIN — SODIUM BICARBONATE 650 MG TABLET 650 MG: at 08:18

## 2021-12-09 RX ADMIN — SODIUM CHLORIDE, POTASSIUM CHLORIDE, SODIUM LACTATE AND CALCIUM CHLORIDE: 600; 310; 30; 20 INJECTION, SOLUTION INTRAVENOUS at 06:05

## 2021-12-09 RX ADMIN — HYDROCORTISONE 15 MG: 5 TABLET ORAL at 08:18

## 2021-12-09 RX ADMIN — METOCLOPRAMIDE HYDROCHLORIDE 10 MG: 5 SOLUTION ORAL at 06:59

## 2021-12-09 RX ADMIN — METOCLOPRAMIDE HYDROCHLORIDE 10 MG: 5 SOLUTION ORAL at 22:26

## 2021-12-09 RX ADMIN — ASPIRIN 81 MG CHEWABLE TABLET 81 MG: 81 TABLET CHEWABLE at 08:18

## 2021-12-09 RX ADMIN — CARBAMAZEPINE 100 MG: 100 SUSPENSION ORAL at 08:19

## 2021-12-09 RX ADMIN — CARBAMAZEPINE 150 MG: 100 SUSPENSION ORAL at 06:04

## 2021-12-09 ASSESSMENT — ACTIVITIES OF DAILY LIVING (ADL)
ADLS_ACUITY_SCORE: 33
ADLS_ACUITY_SCORE: 35
ADLS_ACUITY_SCORE: 31
ADLS_ACUITY_SCORE: 35
ADLS_ACUITY_SCORE: 31
ADLS_ACUITY_SCORE: 33
ADLS_ACUITY_SCORE: 33
ADLS_ACUITY_SCORE: 35
ADLS_ACUITY_SCORE: 35
ADLS_ACUITY_SCORE: 33
ADLS_ACUITY_SCORE: 35
ADLS_ACUITY_SCORE: 33
ADLS_ACUITY_SCORE: 35
ADLS_ACUITY_SCORE: 33
ADLS_ACUITY_SCORE: 33
ADLS_ACUITY_SCORE: 35
ADLS_ACUITY_SCORE: 33
ADLS_ACUITY_SCORE: 35

## 2021-12-09 NOTE — PROGRESS NOTES
Care Management Follow Up    Length of Stay (days): 3    Expected Discharge Date: 12/10/2021     Concerns to be Addressed: Long discussion with patients Fiona Claros [phone] regarding patients frequent admissions to the hospital and the need for home RN.  Shamar states patient has 12 hrs of RN  allotted daily for patient through [facility]. They are having difficulty finding staff to care for patient. Shamar stated patients mother Savannah is difficult to work with and has unrealistic goals and care plans for patient.  There is a home care agency that will service patient if a nidia lift is provided, however patients mother will not approve it cause she feels this will hampers patients mobility. Shamar stated he does not want to put the staff in jeopardy . Shamar suggested patient be moved to an AL facility or LTC as he noted patient's mother will eventually become weak and will not be able to care for patient. Savannah is adamantly opposed to any facilities.   Discussed with CM that we will probably need to switch patient's primary physician to a [facility] in order to get medical care at home if needed. TOVA Shamar was unsure if patient's mother will agree with the plan but requested to suggest this with her . Shamar is willing to  work and assist with any plans that will decrease his hospital admissions.     Patient plan of care discussed at interdisciplinary rounds: Yes    Anticipated Discharge Disposition: Home      Anticipated Discharge Services:  Home care  Anticipated Discharge DME:      Patient/family educated on Medicare website which has current facility and service quality ratings:    Education Provided on the Discharge Plan:  yes  Patient/Family in Agreement with the Plan:yes     Referrals Placed by CM/SW:  homecare  Private pay costs discussed: Not applicable    Additional Information:  Ride set for 4 pm 12/10 by stretcher [employer].      Anita Casillas RN  Inpatient Care  Coordinator  [facility]  # [phone]

## 2021-12-09 NOTE — PLAN OF CARE
Summary: Aspiration pneumonia, sepsis  DATE & TIME: 12/9/21, [id number]   Cognitive Concerns/ Orientation : Alert and HECTOR for orientation due to severe aphasia, speech non verbal/incomprehensive sound.   BEHAVIOR & AGGRESSION TOOL COLOR: Green  ABNL VS/O2: VSS, RA  MOBILITY: Assist of 2 with lift. T&R q 2hrs  PAIN MANAGMENT: Denies.  DIET: Strict NPO. TF running @ goal rate of 55ml/hr. Free water flush of 200cc q 4hrs. No residual.   BOWEL/BLADDER: Incontinent of bowel/bladder. Last BM 12/8/21. Condom catheter in place  ABNL LAB/BG: Ca 7.4, hgb 10.6, hematocrit 32.4, plt 100  DRAIN/DEVICES: LR at 50 ml/hr. PIV continuous.  TELEMETRY RHYTHM: NA  SKIN: pale. Mepilex on coccyx. Abrasion rt thumb.  TESTS/PROCEDURES: none  D/C DATE: discharge in 1-2 days.   Discharge Barriers: pending clinical stability  OTHER IMPORTANT INFO: hx of right sided spastic hemiplegia. Right arm brace in place. Infrequent congested cough, lung sounds diminished.

## 2021-12-09 NOTE — PLAN OF CARE
DATE & TIME: 12/8/21 3-11pm    Cognitive Concerns/ Orientation : Alert and HECTOR for orientation due to severe aphasia, speech non verbal/incomprehensive sound.   BEHAVIOR & AGGRESSION TOOL COLOR: Green  CIWA SCORE: NA   ABNL VS/O2: VSS. RA  MOBILITY: Assist of 2 with lift. T&R q 2hrs  PAIN MANAGMENT: Denies.  DIET: Strict NPO. TF running @ goal rate of 55ml/hr. Free water flush of 200cc q 4hrs.  BOWEL/BLADDER: external catheter in place, voiding. No bm.  ABNL LAB/BG: none.  DRAIN/DEVICES: PIV SL. On iv Zosyn.  TELEMETRY RHYTHM: NA  SKIN: pale. Mepilex on coccyx CDI.   TESTS/PROCEDURES: none  D/C DATE: discharge in 1-2 days   Discharge Barriers: pending clinical stability  OTHER IMPORTANT INFO: hx of right sided spastic hemiplegia. Right arm brace in place. Infrequent congested cough, lung sound diminished. Patient on schedule nebs treatment.

## 2021-12-09 NOTE — PLAN OF CARE
Summary: Aspiration pneumonia, sepsis  DATE & TIME: 12/9/21, ([id number])   Cognitive Concerns/ Orientation : Alert and HECTOR for orientation due to severe aphasia, speech non verbal/incomprehensive sound.   BEHAVIOR & AGGRESSION TOOL COLOR: Green  ABNL VS/O2: VSS, RA  MOBILITY: Assist of 2 with lift. T&R q 2hrs  PAIN MANAGMENT: Denies.  DIET: Strict NPO. TF running @ goal rate of 55ml/hr. Free water flush of 200cc q 4hrs. No residual.  BOWEL/BLADDER: Incontinent of bowel/bladder. Last BM 12/9/21. External catheter in place  ABNL LAB/BG: Ca 7.4, hgb 10.6, hematocrit 32.4, plt 100  DRAIN/DEVICES:N/a   TELEMETRY RHYTHM: NA  SKIN: pale. Mepilex on coccyx, changed dressing. Abrasion rt thumb.  TESTS/PROCEDURES: none  D/C DATE: discharge tomorrow 12/10.   Discharge Barriers: pending clinical stability  OTHER IMPORTANT INFO: hx of right sided spastic hemiplegia. Right arm brace in place. Infrequent congested cough, lung sound diminished. Pt mom is guardian.  Zosyn discontinued today and started on Augmentin via feeding tube.

## 2021-12-09 NOTE — PROGRESS NOTES
[facility]  Hospitalist Progress Note        Helio Galan MD   12/09/2021        Interval History:        - Transferred out of [facility], no acute issues overnight  - respiratory status stable on room air, remains afebrile  - noted drop in platelet 187---121---100         Assessment and Plan:      Keyon Farias is a 61 year old male with history of TBI with aphasia, R sided spastic hemiplegia and dysphagia with GJ-tube for TFs/meds; seizure disorder ;  panhypopituitarism; and frequent hospital admissions for recurrent pneumonia (most recently 11/12-11/17/2021; with 7 hospitalizations so far 2021 mostly for asp pneumonia); who presented 12/5/2021 with fever and AMS and was found with evidence of pneumonia as well as free intraperitoneal air.     Recurrent aspiration pneumonia with acute hypoxic respiratory failure and sepsis.  * Multiple hospitalizations for aspiration pneumonia, last in 11/2021. Has chronic GJ-tube.  * Presented 12/5 with fevers and AMS. Noted to be hypoxic by EMS. On initial evaluation 12/5 was febrile to 102.7, tachycardic, tachypneic, sats in 90's on O2; WBC 11.9, lactate 3.4, procalcitonin 0.21, d-dimer 1.9. CT chest 12/6 showed no PE: prominent bilateral lower lobe infiltrates compatible with pneumonitis; also extraluminal air (see below). Required BiPAP in the ED. Started on pip-tazo and vanco 12/5  - clinically much improved, mentation back to baseline, fever trended down, afebrile since 12/7;  WBC 11.9--->10.3, lactic acid 4.4---3.8, pro jamel 0.21  - blood cultures from 12/5 with no growth so far  - discontinued vancomycin 12/8; will switch Zosyn to Augmentin 875 mg BID through feeding tube (12/9)  - will continue PTA Mucomyst with albuterol nebs 4 times daily  - initially requiring Bipap, oxygen now weaned down to room air     Intraperitoneal free air/gas, possibly related to recent GJ-tube exchange.  * CT chest 12/6 also showed extraluminal air is seen in the right upper  quadrant around the colon, noted this was of indeterminate etiology but concerning for potential bowel perforation.   - CT abdomen and pelvis 12/6 showed small amount of free intraperitoneal gas as well as extraluminal gas in the fat of the abdomen; noted that this extraluminal gas appeared concentrated in the right upper quadrant near the cecum and ascending colon; no specific bowel abnormality was evident.   - Evaluated by CRS in the ED and abdomen was benign and felt that CT findings could be related to recent GJ-tube replacement (11/16).   - abdomen benign; clinically stable; resumed Tube feeds 12/7 and tolerating well    Thrombocytopenia  Chronic Anemia  - noted drop in platelet 187---121---100  - also Hb 15.7---11.4---10.6; was likely hem-concentrated on admission; baseline hemoglobin around 11 to 12  - d/kevin Zosyn on 12/9 as above  -no suggestion of active bleed; hemodynamically stable; will repeat CBC    TBI with aphasia, dysphagia, and R sided spastic hemiplegia.   Dysphagia with GJ-tube for TFs.  * Patient's mother is his caregiver, along with home care attendants. Has frequent hospitalization for recurrent pneumonias.   * GJ-tube replaced 11/16.  * Issues with free intraperitoneal air/gas as above. TF's held on admit--resumed 12/7  - continue PTA scheduled metoclopramide .     Seizure disorder.  Secondary to TBI. 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     GERD.  - Continue PTA PO Protonix through feeding tube         COVID-19 testing: negative on admission 12/05; will discontinue special precautions.        Diet:   Tube feeds resumed 12/7  Orders Placed This Encounter      NPO for Medical/Clinical Reasons Except for: No Exceptions    Prophylaxis: PCD's,  ambulation.     Aaron Catheter: was put on admission for intake/output; incontinent at [facility] and uses condom catheter; switched aaron to condom catheter 12/8     Code Status: Prior    Disposition Plan     Expected discharge: likely 12/10 if clinically stable recommended to prior living arrangement ; therapies in past have recommended TCU but his mother (guardian) wants to take him home.  Clinically Significant Risk Factors Present on Admission                Page Me (7 am to 6 pm)    Care plan discussed with nursing, , patient and his mother Savannah over the phone              Physical Exam:      Blood pressure 130/65, pulse 65, temperature 97.5  F (36.4  C), temperature source Oral, resp. rate 18, weight 75 kg (165 lb 5.5 oz), SpO2 99 %.  Vitals:    12/07/21 0500 12/08/21 0600 12/09/21 0300   Weight: 73.3 kg (161 lb 9.6 oz) 74.2 kg (163 lb 9.3 oz) 75 kg (165 lb 5.5 oz)     Vital Signs with Ranges  Temp:  [97.4  F (36.3  C)-98.6  F (37  C)] 97.5  F (36.4  C)  Pulse:  [65-67] 65  Resp:  [16-18] 18  BP: (115-130)/(51-65) 130/65  SpO2:  [95 %-99 %] 99 %  I/O's Last 24 hours  I/O last 3 completed shifts:  In: 1845 [I.V.:35; NG/GT:1810]  Out: 1100 [Urine:1100]     Constitutional: conscious, alert, awake, mostly nonverbal but follows simple commands and interacts with yes, no, head nodding, thumbs up/down; resting comfortably in no apparent distress   HEENT: Pupils equal and reactive to light and accomodation,   Oral cavity: Moist mucosa   Cardiovascular: Normal s1 s2, regular rate and rhythm, no murmur   Lungs: B/l clear to auscultation, no wheezes or crepitations   Abdomen: Soft, nontender, nondistended.  feeding tube is in place.  No guarding, rigidity or rebound tenderness   LE : No edema   Musculoskeletal: He has spastic hemiplegia on the right and is mostly nonverbal   Neuro:     Psychiatry: normal mood and affect  Condom catheter in place                Medications:          acetylcysteine  2 mL  Nebulization 4x daily     albuterol  2.5 mg Nebulization Q4H While awake     amoxicillin-clavulanate  875 mg Per Feeding Tube BID     aspirin  81 mg Oral or Feeding Tube Daily     Brivaracetam  100 mg Oral or Feeding Tube BID     calcium carbonate  1,250 mg Oral or G tube BID     carBAMazepine  100 mg Oral or G tube Daily     carBAMazepine  150 mg Oral or Feeding Tube TID     hydrocortisone  15 mg Per Feeding Tube Daily     hydrocortisone  5 mg Oral or Feeding Tube Q24H     levothyroxine  150 mcg Oral QAM AC     metoclopramide  10 mg Oral 4x Daily AC & HS     pantoprazole  40 mg Per J Tube Daily     potassium & sodium phosphates  1 packet Oral TID     sodium bicarbonate  650 mg Oral Daily     sodium chloride (PF)  3 mL Intracatheter Q8H     vitamin C  1,000 mg Oral or Feeding Tube Daily     vitamin D3  50 mcg Per Feeding Tube Daily     PRN Meds: acetaminophen, dextrose, lidocaine 4%, lidocaine (buffered or not buffered), melatonin, ondansetron **OR** ondansetron, sodium chloride (PF), sodium chloride (PF)         Data:      All new lab and imaging data was reviewed.   Recent Labs   Lab Test 12/08/21  1952 12/07/21  0455 12/05/21  2226 10/27/20  0331 10/26/20  0400 10/25/20  1345   WBC 5.5 10.3 11.9*   < > 17.0* 27.2*   HGB 10.6* 11.4* 15.7   < > 9.8* 11.7*   MCV 94 96 93   < > 89 91   * 121* 187   < > 147* 224   INR  --   --  1.28*  --  1.82* 1.86*    < > = values in this interval not displayed.      Recent Labs   Lab Test 12/08/21 1952 12/07/21 0455 12/05/21 2227 12/05/21 2226    145*  --  139   POTASSIUM 4.9 3.8  --  4.5   CHLORIDE 108 116*  --  106   CO2 28 27  --  27   BUN 9 10  --  16   CR 0.79 0.90  --  1.24   ANIONGAP 4 2*  --  6   STEVE 7.4* 8.3*  --  8.8   * 159* 122* 122*     Recent Labs   Lab Test 04/15/21  2250 02/19/21  1123 10/25/20  2100   TROPI <0.015 <0.015 0.047*

## 2021-12-10 LAB
BASOPHILS # BLD AUTO: 0.1 10E3/UL (ref 0–0.2)
BASOPHILS NFR BLD AUTO: 1 %
EOSINOPHIL # BLD AUTO: 0.6 10E3/UL (ref 0–0.7)
EOSINOPHIL NFR BLD AUTO: 8 %
ERYTHROCYTE [DISTWIDTH] IN BLOOD BY AUTOMATED COUNT: 13.1 % (ref 10–15)
HCT VFR BLD AUTO: 36.7 % (ref 40–53)
HGB BLD-MCNC: 12.1 G/DL (ref 13.3–17.7)
IMM GRANULOCYTES # BLD: 0 10E3/UL
IMM GRANULOCYTES NFR BLD: 1 %
LYMPHOCYTES # BLD AUTO: 1.5 10E3/UL (ref 0.8–5.3)
LYMPHOCYTES NFR BLD AUTO: 20 %
MCH RBC QN AUTO: 30 PG (ref 26.5–33)
MCHC RBC AUTO-ENTMCNC: 33 G/DL (ref 31.5–36.5)
MCV RBC AUTO: 91 FL (ref 78–100)
MONOCYTES # BLD AUTO: 0.9 10E3/UL (ref 0–1.3)
MONOCYTES NFR BLD AUTO: 12 %
NEUTROPHILS # BLD AUTO: 4.4 10E3/UL (ref 1.6–8.3)
NEUTROPHILS NFR BLD AUTO: 58 %
NRBC # BLD AUTO: 0 10E3/UL
NRBC BLD AUTO-RTO: 0 /100
PLATELET # BLD AUTO: 168 10E3/UL (ref 150–450)
RBC # BLD AUTO: 4.04 10E6/UL (ref 4.4–5.9)
WBC # BLD AUTO: 7.5 10E3/UL (ref 4–11)

## 2021-12-10 PROCEDURE — 250N000013 HC RX MED GY IP 250 OP 250 PS 637: Performed by: INTERNAL MEDICINE

## 2021-12-10 PROCEDURE — 250N000009 HC RX 250: Performed by: HOSPITALIST

## 2021-12-10 PROCEDURE — 99233 SBSQ HOSP IP/OBS HIGH 50: CPT | Performed by: HOSPITALIST

## 2021-12-10 PROCEDURE — 99207 PR CDG-CODE CATEGORY CHANGED: CPT | Performed by: HOSPITALIST

## 2021-12-10 PROCEDURE — 120N000001 HC R&B MED SURG/OB

## 2021-12-10 PROCEDURE — 250N000013 HC RX MED GY IP 250 OP 250 PS 637: Performed by: HOSPITALIST

## 2021-12-10 PROCEDURE — 85025 COMPLETE CBC W/AUTO DIFF WBC: CPT | Performed by: HOSPITALIST

## 2021-12-10 PROCEDURE — 250N000009 HC RX 250: Performed by: INTERNAL MEDICINE

## 2021-12-10 PROCEDURE — 36415 COLL VENOUS BLD VENIPUNCTURE: CPT | Performed by: HOSPITALIST

## 2021-12-10 RX ORDER — METOCLOPRAMIDE HYDROCHLORIDE 5 MG/5ML
10 SOLUTION ORAL
Status: DISCONTINUED | OUTPATIENT
Start: 2021-12-10 | End: 2021-12-13 | Stop reason: HOSPADM

## 2021-12-10 RX ORDER — LEVOTHYROXINE SODIUM 150 UG/1
150 TABLET ORAL
Status: DISCONTINUED | OUTPATIENT
Start: 2021-12-11 | End: 2021-12-13 | Stop reason: HOSPADM

## 2021-12-10 RX ORDER — AMOXICILLIN AND CLAVULANATE POTASSIUM 400; 57 MG/5ML; MG/5ML
875 POWDER, FOR SUSPENSION ORAL 2 TIMES DAILY
Qty: 65.4 ML | Refills: 0 | Status: SHIPPED | OUTPATIENT
Start: 2021-12-10 | End: 2021-12-13

## 2021-12-10 RX ORDER — ACETAMINOPHEN 325 MG/1
975 TABLET ORAL EVERY 8 HOURS PRN
Status: DISCONTINUED | OUTPATIENT
Start: 2021-12-10 | End: 2021-12-13 | Stop reason: HOSPADM

## 2021-12-10 RX ORDER — SODIUM BICARBONATE 650 MG/1
650 TABLET ORAL DAILY
Status: DISCONTINUED | OUTPATIENT
Start: 2021-12-11 | End: 2021-12-13 | Stop reason: HOSPADM

## 2021-12-10 RX ADMIN — AMOXICILLIN AND CLAVULANATE POTASSIUM 875 MG: 400; 57 POWDER, FOR SUSPENSION ORAL at 21:47

## 2021-12-10 RX ADMIN — POTASSIUM & SODIUM PHOSPHATES POWDER PACK 280-160-250 MG 1 PACKET: 280-160-250 PACK at 17:08

## 2021-12-10 RX ADMIN — CARBAMAZEPINE 100 MG: 100 SUSPENSION ORAL at 17:35

## 2021-12-10 RX ADMIN — POTASSIUM & SODIUM PHOSPHATES POWDER PACK 280-160-250 MG 1 PACKET: 280-160-250 PACK at 08:16

## 2021-12-10 RX ADMIN — LEVOTHYROXINE SODIUM 150 MCG: 150 TABLET ORAL at 06:45

## 2021-12-10 RX ADMIN — HYDROCORTISONE 5 MG: 5 TABLET ORAL at 17:05

## 2021-12-10 RX ADMIN — CARBAMAZEPINE 150 MG: 100 SUSPENSION ORAL at 06:45

## 2021-12-10 RX ADMIN — BRIVARACETAM 100 MG: 10 SOLUTION ORAL at 09:39

## 2021-12-10 RX ADMIN — METOCLOPRAMIDE HYDROCHLORIDE 10 MG: 5 SOLUTION ORAL at 17:08

## 2021-12-10 RX ADMIN — ACETYLCYSTEINE 2 ML: 200 SOLUTION ORAL; RESPIRATORY (INHALATION) at 11:23

## 2021-12-10 RX ADMIN — POTASSIUM & SODIUM PHOSPHATES POWDER PACK 280-160-250 MG 1 PACKET: 280-160-250 PACK at 22:34

## 2021-12-10 RX ADMIN — METOCLOPRAMIDE HYDROCHLORIDE 10 MG: 5 SOLUTION ORAL at 11:37

## 2021-12-10 RX ADMIN — CARBAMAZEPINE 150 MG: 100 SUSPENSION ORAL at 22:00

## 2021-12-10 RX ADMIN — CARBAMAZEPINE 150 MG: 100 SUSPENSION ORAL at 11:37

## 2021-12-10 RX ADMIN — ALBUTEROL SULFATE 2.5 MG: 2.5 SOLUTION RESPIRATORY (INHALATION) at 17:17

## 2021-12-10 RX ADMIN — ASPIRIN 81 MG CHEWABLE TABLET 81 MG: 81 TABLET CHEWABLE at 08:16

## 2021-12-10 RX ADMIN — HYDROCORTISONE 15 MG: 5 TABLET ORAL at 08:16

## 2021-12-10 RX ADMIN — ALBUTEROL SULFATE 2.5 MG: 2.5 SOLUTION RESPIRATORY (INHALATION) at 10:56

## 2021-12-10 RX ADMIN — METOCLOPRAMIDE HYDROCHLORIDE 10 MG: 5 SOLUTION ORAL at 22:34

## 2021-12-10 RX ADMIN — CARBAMAZEPINE 150 MG: 100 SUSPENSION ORAL at 02:27

## 2021-12-10 RX ADMIN — OXYCODONE HYDROCHLORIDE AND ACETAMINOPHEN 1000 MG: 500 TABLET ORAL at 08:16

## 2021-12-10 RX ADMIN — Medication 50 MCG: at 08:16

## 2021-12-10 RX ADMIN — ALBUTEROL SULFATE 2.5 MG: 2.5 SOLUTION RESPIRATORY (INHALATION) at 21:47

## 2021-12-10 RX ADMIN — ACETYLCYSTEINE 2 ML: 200 SOLUTION ORAL; RESPIRATORY (INHALATION) at 21:01

## 2021-12-10 RX ADMIN — Medication 40 MG: at 09:39

## 2021-12-10 RX ADMIN — CALCIUM CARBONATE 1250 MG: 1250 SUSPENSION ORAL at 11:37

## 2021-12-10 RX ADMIN — AMOXICILLIN AND CLAVULANATE POTASSIUM 875 MG: 400; 57 POWDER, FOR SUSPENSION ORAL at 09:39

## 2021-12-10 RX ADMIN — BRIVARACETAM 100 MG: 10 SOLUTION ORAL at 21:01

## 2021-12-10 RX ADMIN — SODIUM BICARBONATE 650 MG TABLET 650 MG: at 08:16

## 2021-12-10 RX ADMIN — CALCIUM CARBONATE 1250 MG: 1250 SUSPENSION ORAL at 21:46

## 2021-12-10 RX ADMIN — ACETYLCYSTEINE 2 ML: 200 SOLUTION ORAL; RESPIRATORY (INHALATION) at 17:16

## 2021-12-10 RX ADMIN — METOCLOPRAMIDE HYDROCHLORIDE 10 MG: 5 SOLUTION ORAL at 06:45

## 2021-12-10 ASSESSMENT — ACTIVITIES OF DAILY LIVING (ADL)
ADLS_ACUITY_SCORE: 35
ADLS_ACUITY_SCORE: 39
ADLS_ACUITY_SCORE: 35
ADLS_ACUITY_SCORE: 39
ADLS_ACUITY_SCORE: 35
ADLS_ACUITY_SCORE: 39
ADLS_ACUITY_SCORE: 35

## 2021-12-10 NOTE — PLAN OF CARE
.Date/Time: December 10, 2021 7pm-7:30 am  Reason for Admission: Aspiration Pneumonia, sepsis    Cognitive/Mentation: Alert, HECTOR orientation due to nonverbal  Neuros/CMS:HECTOR  VS:VSS, on RA. B/P: 118/69, T: 97.3, P: 64, R: 16     GI: no signs of n/v  : Incontinent of bowl/ bladder. External cath in place.   Pain:Denies    Drains: Tube feeding running @ 55mlhr. Free water flush of 200cc q 4hrs.  Skin:Mepilex on coccyx, CDI. Abrasion rt thumb. Right arm brace in place, no skin breakdown noted.   Activity: up w/1, lift  Diet: NPO      Discharge: Pending for today.    End of shift summary:  No other events overnight.

## 2021-12-10 NOTE — PLAN OF CARE
Summary: Aspiration pneumonia, sepsis  DATE & TIME: 12/10/21, 4810-1586   Cognitive Concerns/ Orientation : Alert and HECTOR for orientation due to severe aphasia, speech non verbal/incomprehensive sound.   BEHAVIOR & AGGRESSION TOOL COLOR: Green  ABNL VS/O2: VSS, RA  MOBILITY: Assist of 2 with lift. T&R q 2hrs  PAIN MANAGMENT: Denies.  DIET: Strict NPO. TF running @ goal rate of 55ml/hr. Free water flush of 200cc q 4hrs. No residual.  BOWEL/BLADDER: Incontinent of bowel/bladder. Last BM 12/10/21. External catheter in place  ABNL LAB/BG:   DRAIN/DEVICES:PIV SL/ Jtube    TELEMETRY RHYTHM: NA  SKIN: pale. Mepilex on coccyx, changed dressing. Abrasion rt thumb.  TESTS/PROCEDURES: none  D/C DATE: discharge today, planned transport 1600  Discharge Barriers: none  OTHER IMPORTANT INFO: hx of right sided spastic hemiplegia. Right arm brace in place. Infrequent congested cough, lung sound diminished. Pt mom is guardian.

## 2021-12-10 NOTE — DISCHARGE SUMMARY
"[facility]  Hospitalist Discharge Summary      Date of Admission:  12/5/2021  Date of Discharge:  12/10/2021  Discharging Provider: Edgar Jenkins MD    Discharge Diagnoses    1. Sepsis due to aspiration pneumonia   2. Hypoxic acute respiratory failure   3. Intraperitoneal free air/gas, possibly related to recent GJ-tube exchange  4. Thrombocytopenia possibly from medication    History of     Chronic anemia    TBI with aphasia, dysphagia, and right sided spastic hemiplegia    Dysphagia with GJ-tube for tube feedings     Seizure disorder    Panhypopituitarism    Gastroesophageal reflux disease     Follow-ups Needed After Discharge   Follow-up Appointments     Follow-up and recommended labs and tests       Follow up with primary care provider, Carlos Gomez, within 7 days for   hospital follow- up.  The following labs/tests are recommended: CBC and   CMP in 7 days.  Chest X-ray in 4 weeks.             Unresulted Labs Ordered in the Past 30 Days of this Admission     Date and Time Order Name Status Description    12/5/2021 10:23 PM Blood Culture Peripheral Blood Preliminary     12/5/2021 10:23 PM Blood Culture Peripheral Blood Preliminary       These results will be followed up by primary care provider     Discharge Disposition   Discharged to home  Condition at discharge: Stable    Hospital Course    HPI:  \"Keyon Farias is a 61-year-old  gentleman who is a frequent patient here at [facility] who has history of traumatic brain injury leading to spastic hemiplegia as well as panhypopituitarism, seizure disorder, chronic dysphagia, status post PEG tube placement and complications of cholelithiasis, prior necrotizing pancreatitis with pseudocyst, multiple episodes of sepsis due to recurrent aspiration pneumonia, who now presents with fever, lethargy, likely had most recently on 11/13/2021 of this year, presents with suspected aspiration pneumonia, tachypnea.  The " "patient over the past 24 hours has had decreased responsiveness, fever.  The patient presents from home where he lives with his mother who is also his guardian.  She had noted a fever of 101.5 with increased work of breathing decreased responsiveness.  EMS was contacted.  The patient was noted to be warm to touch.  He had significant work of breathing.  Oxygen sats were in the mid 80s.  He was placed on a nonrebreather facemask.  Heart rate was noted to be tachycardic with heart rates in the 130s to 140s.  The patient unable to verbalize any history for baseline, but does follow commands.  The patient was brought to [facility] for further assessment.  In the Emergency Room, the patient was seen by Dr. Jake Melendez.  The patient was COVID negative.  CT showed no PE, but evidence of aspiration so he was given IV fluids, Tylenol.  His heart rate did come down from 140s to 90s.  He had a CT, which showed bilateral lower lobe pneumonia, plus a small amount of free air and extraluminal gas in the right upper quadrant.  Initially General Surgery was contacted, who deferred to colorectal surgery who graciously came and saw the patient.  It was noted that the patient has a chronic GJ tube, which was recently replaced on 11/17/2021.  The patient was examined and noted that his abdominal exam was benign.  The recommendation was to keep the patient n.p.o. with serial abdominal exams and that this free air may be a result of the GJ tube exchange.  The patient did receive Solu-Cortef, noting due to his panhypopituitarism he is on hydrocortisone.  The patient received lactated Ringer bolus and current infusion as well as Zosyn and vancomycin.\"    Keyon Farias is a 61 year old male with history of TBI with aphasia, R sided spastic hemiplegia and dysphagia with GJ-tube for TFs/meds; seizure disorder ;  panhypopituitarism; and frequent hospital admissions for recurrent pneumonia (most " recently 11/12-11/17/2021; with 7 hospitalizations so far 2021 mostly for asp pneumonia); who presented 12/5/2021 with fever and AMS and was found with evidence of pneumonia as well as free intraperitoneal air.     Recurrent aspiration pneumonia with acute hypoxic respiratory failure and sepsis.  * Multiple hospitalizations for aspiration pneumonia, last in 11/2021. Has chronic GJ-tube.  * Presented 12/5 with fevers and AMS. Noted to be hypoxic by EMS. On initial evaluation 12/5 was febrile to 102.7, tachycardic, tachypneic, sats in 90's on O2; WBC 11.9, lactate 3.4, procalcitonin 0.21, d-dimer 1.9. CT chest 12/6 showed no PE: prominent bilateral lower lobe infiltrates compatible with pneumonitis; also extraluminal air (see below). Required BiPAP in the ED. Started on pip-tazo and vanco 12/5  - clinically much improved, mentation back to baseline, fever trended down, afebrile since 12/7;  WBC 11.9--->10.3, lactic acid 4.4---3.8, pro jamel 0.21  - blood cultures from 12/5 with no growth so far  - discontinued vancomycin 12/8; will switch Zosyn to Augmentin 875 mg BID through feeding tube (12/9)  - will continue PTA Mucomyst with albuterol nebs 4 times daily  - initially requiring Bipap, oxygen now weaned down to room air  -complete antibiotics with Augmentin      Intraperitoneal free air/gas, possibly related to recent GJ-tube exchange.  * CT chest 12/6 also showed extraluminal air is seen in the right upper quadrant around the colon, noted this was of indeterminate etiology but concerning for potential bowel perforation.   - CT abdomen and pelvis 12/6 showed small amount of free intraperitoneal gas as well as extraluminal gas in the fat of the abdomen; noted that this extraluminal gas appeared concentrated in the right upper quadrant near the cecum and ascending colon; no specific bowel abnormality was evident.   - Evaluated by CRS in the ED and abdomen was benign and felt that CT findings could be related to recent  GJ-tube replacement (11/16).   - abdomen benign; clinically stable; resumed Tube feeds 12/7 and tolerating well     Thrombocytopenia  Chronic Anemia  - noted drop in platelet 187---121---100  - also Hb 15.7---11.4---10.6; was likely hem-concentrated on admission; baseline hemoglobin around 11 to 12  - d/kevin Zosyn on 12/9 as above  -no suggestion of active bleed; hemodynamically stable;  -CBC with primary care provider      TBI with aphasia, dysphagia, and R sided spastic hemiplegia.   Dysphagia with GJ-tube for TFs.  * Patient's mother is his caregiver, along with home care attendants. Has frequent hospitalization for recurrent pneumonias.   * GJ-tube replaced 11/16.  * Issues with free intraperitoneal air/gas as above. TF's held on admit--resumed 12/7  - continue PTA scheduled metoclopramide .     Seizure disorder.  Secondary to TBI. 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     GERD.  - Continue PTA PO Protonix through feeding tube     Consultations This Hospital Stay   PHARMACY TO DOSE VANCO  COLORECTAL SURGERY IP CONSULT  PHARMACY IP CONSULT  PHARMACY TO DOSE VANCO  COLORECTAL SURGERY IP CONSULT  PHARMACY IP CONSULT  WOUND OSTOMY CONTINENCE NURSE  IP CONSULT  CARE MANAGEMENT / SOCIAL WORK IP CONSULT  PHARMACY IP CONSULT    Code Status   Full Code    Time Spent on this Encounter   I, Edgar Jenkins MD, personally saw the patient today and spent greater than 30 minutes discharging this patient.       Edgar Jenkins MD  [place] [address] [facility]  [address] [place] [address]  Phone: [phone]  ______________________________________________________________________    Physical Exam   Vital Signs: Temp: 98.4  F (36.9   C) Temp src: Axillary BP: 131/61 Pulse: 74   Resp: 18 SpO2: 97 % O2 Device: None (Room air)    Weight: 174 lbs 2.61 oz  Constitutional: awake, alert, cooperative, no apparent distress  Respiratory: No increased work of breathing, good air exchange, clear to auscultation bilaterally, no crackles or wheezing  Cardiovascular: regular rate and rhythm, normal S1 and S2, no S3 or S4, and no murmur noted  GI:  normal bowel sounds, soft, non-distended, non-tender, +PEG  Neuropsychiatric: General: normal, calm and normal eye contact       Primary Care Physician   Carlos Gomez    Discharge Orders      Medication Therapy Management Referral      Home care nursing referral      Home Care PT Referral for Hospital Discharge      Home Care OT Referral for Hospital Discharge      Reason for your hospital stay    Pneumonia     Follow-up and recommended labs and tests     Follow up with primary care provider, Carlos Gomez, within 7 days for hospital follow- up.  The following labs/tests are recommended: CBC and CMP in 7 days.  Chest X-ray in 4 weeks.     Activity    Your activity upon discharge: activity as tolerated     Tubes and drains    You are going home with the following tubes or drains: feeding tube G-Tube.   Tube cares per hospital or home care instructions     Diet    Follow this diet upon discharge: Orders Placed This Encounter      Adult Formula Drip Feeding: Continuous Jevity 1.5; Jejunostomy; Goal Rate: 55; mL/hr; Medication - Feeding Tube Flush Frequency: At least 15-30 mL water before and after medication administration and with tube clogging      NPO for Medical/Clinical Reasons Except for: No Exceptions       Significant Results and Procedures   Most Recent 3 CBC's:  Recent Labs   Lab Test 12/10/21  1219 12/08/21 1952 12/07/21  0455   WBC 7.5 5.5 10.3   HGB 12.1* 10.6* 11.4*   MCV 91 94 96    100* 121*     Most Recent 3 BMP's:  Recent Labs   Lab Test 12/09/21  1042 12/08/21 1952 12/07/21  0455  12/05/21 2227 12/05/21 2226   NA  --  140 145*  --  139   POTASSIUM  --  4.9 3.8  --  4.5   CHLORIDE  --  108 116*  --  106   CO2  --  28 27  --  27   BUN  --  9 10  --  16   CR  --  0.79 0.90  --  1.24   ANIONGAP  --  4 2*  --  6   STEVE  --  7.4* 8.3*  --  8.8   * 127* 159*   < > 122*    < > = values in this interval not displayed.   ,   Results for orders placed or performed during the hospital encounter of 12/05/21   CT Chest Pulmonary Embolism w Contrast    Narrative    EXAM: CT CHEST PULMONARY EMBOLISM W CONTRAST  LOCATION: [facility]  DATE/TIME: 12/5/2021 11:47 PM    INDICATION: Decreased responsiveness and fever.  COMPARISON: 4/16/2021.  TECHNIQUE: CT chest pulmonary angiogram during arterial phase injection of IV contrast. Multiplanar reformats and MIP reconstructions were performed. Dose reduction techniques were used.   CONTRAST: 80 mL Isovue-370.    FINDINGS:  ANGIOGRAM CHEST: Pulmonary arteries are normal caliber and negative for pulmonary emboli. Thoracic aorta is negative for dissection. No CT evidence of right heart strain.    LUNGS AND PLEURA: Prominent bilateral lower lobe infiltrate/consolidation, right greater than left with an appearance compatible with acute pneumonitis. Appearance and distribution would not be typical for COVID-19 pneumonitis but not completely   excluded. Aspiration pneumonitis could potentially give this appearance. Clinical correlation. No pleural effusions.    MEDIASTINUM/AXILLAE: Few hilar and mediastinal nodes are favored to be reactive in nature. Normal heart size. No pericardial effusion. Normal caliber thoracic aorta. Esophagus is grossly negative.    CORONARY ARTERY CALCIFICATION: None.    UPPER ABDOMEN: Small cyst in the left hepatic lobe. Gallbladder appears absent. Percutaneous gastrojejunostomy tube partially visualized. Small amounts of extraluminal air seen in the right upper quadrant about the colon of uncertain significance or    etiology. Bowel perforation cannot be excluded. Dedicated CT of the abdomen and pelvis suggested for further evaluation.    MUSCULOSKELETAL: Normal.      Impression    IMPRESSION:  1.  Negative for pulmonary embolism.    2.  Prominent bilateral lower lobe infiltrates compatible with pneumonitis. Appearance and distribution would not be typical for COVID-19 pneumonitis but not completely excluded. Aspiration pneumonitis could have this appearance. Clinical correlation.    3.  Extraluminal air is seen in the right upper quadrant around the colon. This is of indeterminate etiology but concerning for potential bowel perforation. Dedicated abdomen and pelvis CT would be helpful in further evaluation.    Findings called to Dr. Melendez 12/6/2021 12:51 AM.   CT Abdomen Pelvis w Contrast    Narrative    EXAM: CT ABDOMEN PELVIS W CONTRAST  LOCATION: [facility]  DATE/TIME: 12/6/2021 1:27 AM    INDICATION: Abdominal pain and fever. Free air in the upper abdomen on chest CT.  COMPARISON: Chest CT 12/5/2021. CAP 1/15/2021.  TECHNIQUE: CT scan of the abdomen and pelvis was performed following injection of IV contrast. Multiplanar reformats were obtained. Dose reduction techniques were used.  CONTRAST: 79 mL Isovue-370.    FINDINGS:   LOWER CHEST: Bilateral lower lobe infiltrates, right greater than left.    HEPATOBILIARY: The gallbladder is absent. Small cyst in the left lobe of the liver.    PANCREAS: 1.9 cm cyst in the pancreas tail has decreased in size in the interval.    SPLEEN: Normal.    ADRENAL GLANDS: Normal.    KIDNEYS/BLADDER: No hydronephrosis. Catheter in the urinary bladder.    BOWEL: There is a large amount of stool in the rectum. GJ tube in place. There is no bowel obstruction or inflammation. There is a small amount of free intraperitoneal air and several gas collections in the fat of the abdomen. Extraluminal gas   collections appear to be concentrated in the right upper quadrant near  the cecum which is located in the right upper quadrant. The appendix is not identified though there appear to be postoperative changes of the cecum suggesting previous appendectomy.    LYMPH NODES: Normal.    VASCULATURE: Atherosclerotic calcification of the aorta and its branches. No aneurysm.    PELVIC ORGANS: Normal.    MUSCULOSKELETAL: Mild degenerative disease in the spine.      Impression    IMPRESSION:   1.  Small amount of free intraperitoneal gas as well as extraluminal gas in the fat of the abdomen. This extraluminal gas appears concentrated in the right upper quadrant near the cecum and ascending colon. No specific bowel abnormality is evident.  2.  Large amount of stool in the rectum. No bowel obstruction.  3.  Bilateral lower lobe infiltrates suspicious for pneumonia.       Discharge Medications   Current Discharge Medication List      START taking these medications    Details   amoxicillin-clavulanate (AUGMENTIN) 400-57 MG/5ML suspension 10.9 mLs (875 mg) by Per Feeding Tube route 2 times daily for 3 days  Qty: 65.4 mL, Refills: 0    Associated Diagnoses: Severe sepsis (H)         CONTINUE these medications which have NOT CHANGED    Details   acetaminophen (TYLENOL 8 HOUR) 650 MG CR tablet Take 650 mg by mouth every 8 hours as needed for mild pain or fever      acetylcysteine (MUCOMYST) 20 % neb solution Take 2 mLs by nebulization 4 times daily With albuterol at 0700, 1100, 1500, and 1900       albuterol (PROVENTIL) (5 MG/ML) 0.5% neb solution Take 2.5 mg by nebulization every 4 hours (while awake) 0700 1100 1500 1900 with mucomyst       aspirin (ASA) 81 MG chewable tablet 81 mg by Oral or Feeding Tube route daily At 0900      bacitracin ointment Apply topically daily as needed for wound care To PEG site.       Brivaracetam (BRIVIACT) 10 MG/ML solution 100 mg by Oral or Feeding Tube route 2 times daily 0900, 2100      calcium carbonate 1250 MG/5ML SUSP suspension Take 1,250 mg by mouth 2 times daily  0900, 2100      !! carBAMazepine (TEGRETOL) 100 MG/5ML suspension Take 100 mg by mouth daily Take at 1800       !! carBAMazepine (TEGRETOL) 100 MG/5ML suspension 150 mg by Oral or Feeding Tube route 3 times daily At 06:00, 12:00, and 24:00 for seizures      clotrimazole-betamethasone (LOTRISONE) 1-0.05 % external cream Apply topically 2 times daily as needed   Qty:        hydrocortisone (CORTEF) 5 MG tablet Take 15 mg (3 tablets) in the morning and 5 mg (1 tablet)  at 2:00 PM. During illness patient takes more as a stress dose. Please increase the dose as directed.  Qty: 400 tablet, Refills: 3    Associated Diagnoses: Secondary adrenal insufficiency (H)      hydrocortisone 1 % CREA cream Place rectally 2 times daily as needed for other Apply to reddened memo areas as needed      levothyroxine (SYNTHROID/LEVOTHROID) 150 MCG tablet Take 1 tablet (150 mcg) by mouth daily  Qty: 90 tablet, Refills: 3    Associated Diagnoses: Secondary hypothyroidism      metoclopramide (REGLAN) 10 MG/10ML SOLN solution Take 10 mg by mouth 4 times daily (before meals and nightly) 0800, 1200, 1600, 2000  Disconnects bag before administration, then waits 45 mins before reconnecting after giving the medication      multivitamin, therapeutic (THERA-VIT) TABS tablet Take 1 tablet by mouth daily      mupirocin (BACTROBAN) 2 % external ointment Apply topically 2 times daily as needed       pantoprazole (PROTONIX) 2 mg/mL SUSP suspension 20 mLs (40 mg) by Per J Tube route daily  Qty: 400 mL, Refills: 0    Comments: Future refills by PCP Dr. Carlos Gomez with phone number [phone].  Associated Diagnoses: Gastroesophageal reflux disease, esophagitis presence not specified      potassium & sodium phosphates (NEUTRA-PHOS) 280-160-250 MG Packet Take 1 packet by mouth 3 times daily       Scopolamine HBr POWD Dispense #90. Mix contents with small amount of water for admin via J-tube.  Administer 0.8 mg three times each day.  Qty: 450 g, Refills: 1     Associated Diagnoses: Aspiration pneumonia of both lungs due to gastric secretions, unspecified part of lung (H)      Skin Protectants, Misc. (BALMEX SKIN PROTECTANT) OINT Externally apply topically 2 times daily as needed (irritation) Applay to reddened memo areas twice daily as needed      sodium bicarbonate 650 MG tablet Take 1 tablet (650 mg) by mouth daily  Qty: 30 tablet, Refills: 0    Associated Diagnoses: Hyponatremia      testosterone cypionate (DEPOTESTOSTERONE) 200 MG/ML injection Inject 0.3 mLs (60 mg) into the muscle every 7 days  Qty: 6 mL, Refills: 1    Associated Diagnoses: Panhypopituitarism (H); Hypogonadism male      vitamin C (ASCORBIC ACID) 1000 MG TABS 1,000 mg by Oral or Feeding Tube route daily       vitamin D3 (CHOLECALCIFEROL) 2000 units (50 mcg) tablet Take 2,000 Units by mouth daily Crush and feed via j-tube @@ 0900       !! - Potential duplicate medications found. Please discuss with provider.        Allergies   Allergies   Allergen Reactions     Valproic Acid Other (See Comments)     Toxicity w/ bone marrow suspension, elevated ammonia levels      Dilantin [Phenytoin Sodium] Other (See Comments)     Severe Trembling     Scopolamine Hives     Hives with the patch - oral no problem

## 2021-12-10 NOTE — PROGRESS NOTES
Patients transportation cancelled as patient has been having increase loose stools. Mother Savannah informed.   SW on wknd will need to reschedule  Ride. PCS form completed.   Anita Casillas RN  Inpatient Care Coordinator  [facility]  # [phone]

## 2021-12-11 LAB
ANION GAP SERPL CALCULATED.3IONS-SCNC: 7 MMOL/L (ref 3–14)
BACTERIA BLD CULT: NO GROWTH
BACTERIA BLD CULT: NO GROWTH
BUN SERPL-MCNC: 12 MG/DL (ref 7–30)
CALCIUM SERPL-MCNC: 8.4 MG/DL (ref 8.5–10.1)
CHLORIDE BLD-SCNC: 98 MMOL/L (ref 94–109)
CO2 SERPL-SCNC: 25 MMOL/L (ref 20–32)
CREAT SERPL-MCNC: 0.67 MG/DL (ref 0.66–1.25)
GFR SERPL CREATININE-BSD FRML MDRD: >90 ML/MIN/1.73M2
GLUCOSE BLD-MCNC: 135 MG/DL (ref 70–99)
MAGNESIUM SERPL-MCNC: 2.1 MG/DL (ref 1.6–2.3)
OSMOLALITY SERPL: 271 MMOL/KG (ref 275–295)
OSMOLALITY UR: 385 MMOL/KG (ref 100–1200)
PHOSPHATE SERPL-MCNC: 2.1 MG/DL (ref 2.5–4.5)
POTASSIUM BLD-SCNC: 4.1 MMOL/L (ref 3.4–5.3)
SODIUM SERPL-SCNC: 130 MMOL/L (ref 133–144)
SODIUM SERPL-SCNC: 130 MMOL/L (ref 133–144)
SODIUM UR-SCNC: 126 MMOL/L

## 2021-12-11 PROCEDURE — 83930 ASSAY OF BLOOD OSMOLALITY: CPT | Performed by: INTERNAL MEDICINE

## 2021-12-11 PROCEDURE — 84100 ASSAY OF PHOSPHORUS: CPT | Performed by: INTERNAL MEDICINE

## 2021-12-11 PROCEDURE — 84295 ASSAY OF SERUM SODIUM: CPT | Performed by: INTERNAL MEDICINE

## 2021-12-11 PROCEDURE — 250N000013 HC RX MED GY IP 250 OP 250 PS 637: Performed by: INTERNAL MEDICINE

## 2021-12-11 PROCEDURE — 94640 AIRWAY INHALATION TREATMENT: CPT | Mod: 76

## 2021-12-11 PROCEDURE — 99232 SBSQ HOSP IP/OBS MODERATE 35: CPT | Performed by: INTERNAL MEDICINE

## 2021-12-11 PROCEDURE — 250N000013 HC RX MED GY IP 250 OP 250 PS 637: Performed by: HOSPITALIST

## 2021-12-11 PROCEDURE — 80048 BASIC METABOLIC PNL TOTAL CA: CPT | Performed by: INTERNAL MEDICINE

## 2021-12-11 PROCEDURE — 83735 ASSAY OF MAGNESIUM: CPT | Performed by: INTERNAL MEDICINE

## 2021-12-11 PROCEDURE — 84300 ASSAY OF URINE SODIUM: CPT | Performed by: INTERNAL MEDICINE

## 2021-12-11 PROCEDURE — 94640 AIRWAY INHALATION TREATMENT: CPT

## 2021-12-11 PROCEDURE — 99207 PR NO DOCUMENTATION ON VISIT: CPT | Performed by: INTERNAL MEDICINE

## 2021-12-11 PROCEDURE — 999N000157 HC STATISTIC RCP TIME EA 10 MIN

## 2021-12-11 PROCEDURE — 83935 ASSAY OF URINE OSMOLALITY: CPT | Performed by: INTERNAL MEDICINE

## 2021-12-11 PROCEDURE — 36415 COLL VENOUS BLD VENIPUNCTURE: CPT | Performed by: INTERNAL MEDICINE

## 2021-12-11 PROCEDURE — 250N000009 HC RX 250: Performed by: HOSPITALIST

## 2021-12-11 PROCEDURE — 99207 PR CDG-MDM COMPONENT: MEETS MODERATE - DOWN CODED: CPT | Performed by: INTERNAL MEDICINE

## 2021-12-11 PROCEDURE — 120N000001 HC R&B MED SURG/OB

## 2021-12-11 PROCEDURE — 250N000009 HC RX 250: Performed by: INTERNAL MEDICINE

## 2021-12-11 RX ADMIN — CARBAMAZEPINE 100 MG: 100 SUSPENSION ORAL at 18:17

## 2021-12-11 RX ADMIN — ASPIRIN 81 MG CHEWABLE TABLET 81 MG: 81 TABLET CHEWABLE at 08:52

## 2021-12-11 RX ADMIN — ALBUTEROL SULFATE 2.5 MG: 2.5 SOLUTION RESPIRATORY (INHALATION) at 20:24

## 2021-12-11 RX ADMIN — CALCIUM CARBONATE 1250 MG: 1250 SUSPENSION ORAL at 08:26

## 2021-12-11 RX ADMIN — POTASSIUM & SODIUM PHOSPHATES POWDER PACK 280-160-250 MG 1 PACKET: 280-160-250 PACK at 22:31

## 2021-12-11 RX ADMIN — POTASSIUM & SODIUM PHOSPHATES POWDER PACK 280-160-250 MG 1 PACKET: 280-160-250 PACK at 16:19

## 2021-12-11 RX ADMIN — METOCLOPRAMIDE HYDROCHLORIDE 10 MG: 5 SOLUTION ORAL at 18:16

## 2021-12-11 RX ADMIN — METOCLOPRAMIDE HYDROCHLORIDE 10 MG: 5 SOLUTION ORAL at 08:26

## 2021-12-11 RX ADMIN — BRIVARACETAM 100 MG: 10 SOLUTION ORAL at 20:46

## 2021-12-11 RX ADMIN — METOCLOPRAMIDE HYDROCHLORIDE 10 MG: 5 SOLUTION ORAL at 22:31

## 2021-12-11 RX ADMIN — ACETYLCYSTEINE 2 ML: 200 SOLUTION ORAL; RESPIRATORY (INHALATION) at 15:51

## 2021-12-11 RX ADMIN — ACETYLCYSTEINE 2 ML: 200 SOLUTION ORAL; RESPIRATORY (INHALATION) at 11:24

## 2021-12-11 RX ADMIN — METOCLOPRAMIDE HYDROCHLORIDE 10 MG: 5 SOLUTION ORAL at 13:50

## 2021-12-11 RX ADMIN — CARBAMAZEPINE 150 MG: 100 SUSPENSION ORAL at 06:07

## 2021-12-11 RX ADMIN — ALBUTEROL SULFATE 2.5 MG: 2.5 SOLUTION RESPIRATORY (INHALATION) at 15:51

## 2021-12-11 RX ADMIN — ALBUTEROL SULFATE 2.5 MG: 2.5 SOLUTION RESPIRATORY (INHALATION) at 11:24

## 2021-12-11 RX ADMIN — CALCIUM CARBONATE 1250 MG: 1250 SUSPENSION ORAL at 20:45

## 2021-12-11 RX ADMIN — HYDROCORTISONE 15 MG: 5 TABLET ORAL at 08:52

## 2021-12-11 RX ADMIN — HYDROCORTISONE 5 MG: 5 TABLET ORAL at 13:50

## 2021-12-11 RX ADMIN — SODIUM BICARBONATE 650 MG TABLET 650 MG: at 08:35

## 2021-12-11 RX ADMIN — ACETYLCYSTEINE 2 ML: 200 SOLUTION ORAL; RESPIRATORY (INHALATION) at 20:24

## 2021-12-11 RX ADMIN — LEVOTHYROXINE SODIUM 150 MCG: 150 TABLET ORAL at 08:19

## 2021-12-11 RX ADMIN — ACETYLCYSTEINE 2 ML: 200 SOLUTION ORAL; RESPIRATORY (INHALATION) at 06:10

## 2021-12-11 RX ADMIN — POTASSIUM & SODIUM PHOSPHATES POWDER PACK 280-160-250 MG 1 PACKET: 280-160-250 PACK at 08:39

## 2021-12-11 RX ADMIN — CARBAMAZEPINE 150 MG: 100 SUSPENSION ORAL at 14:29

## 2021-12-11 RX ADMIN — CARBAMAZEPINE 150 MG: 100 SUSPENSION ORAL at 23:11

## 2021-12-11 RX ADMIN — Medication 40 MG: at 08:53

## 2021-12-11 RX ADMIN — OXYCODONE HYDROCHLORIDE AND ACETAMINOPHEN 1000 MG: 500 TABLET ORAL at 08:35

## 2021-12-11 RX ADMIN — Medication 50 MCG: at 08:35

## 2021-12-11 RX ADMIN — ALBUTEROL SULFATE 2.5 MG: 2.5 SOLUTION RESPIRATORY (INHALATION) at 06:19

## 2021-12-11 RX ADMIN — BRIVARACETAM 100 MG: 10 SOLUTION ORAL at 08:53

## 2021-12-11 RX ADMIN — AMOXICILLIN AND CLAVULANATE POTASSIUM 875 MG: 400; 57 POWDER, FOR SUSPENSION ORAL at 20:46

## 2021-12-11 RX ADMIN — AMOXICILLIN AND CLAVULANATE POTASSIUM 875 MG: 400; 57 POWDER, FOR SUSPENSION ORAL at 08:26

## 2021-12-11 ASSESSMENT — ACTIVITIES OF DAILY LIVING (ADL)
ADLS_ACUITY_SCORE: 39

## 2021-12-11 NOTE — DISCHARGE SUMMARY
[facility]    Discharge Summary  Hospitalist    Date of Admission:  12/5/2021  Date of Discharge:  12/13/2021  Discharging Provider:Starr Champion MD     Discharge Diagnoses   1. Sepsis due to aspiration pneumonia   2. Hypoxic acute respiratory failure   3. Intraperitoneal free air/gas, possibly related to recent GJ-tube exchange  4. Thrombocytopenia possibly from medication     History of     Chronic anemia    TBI with aphasia, dysphagia, and right sided spastic hemiplegia    Dysphagia with GJ-tube for tube feedings     Seizure disorder    Panhypopituitarism    Gastroesophageal reflux disease           History of Present Illness   Keyon Farias is a 61-year-old  gentleman who is a frequent patient here at [facility] who has history of traumatic brain injury leading to spastic hemiplegia as well as panhypopituitarism, seizure disorder, chronic dysphagia, status post PEG tube placement and complications of cholelithiasis, prior necrotizing pancreatitis with pseudocyst, multiple episodes of sepsis due to recurrent aspiration pneumonia, who now presents with fever, lethargy, likely had most recently on 11/13/2021 of this year, presents with suspected aspiration pneumonia, tachypnea.     The patient over the past 24 hours has had decreased responsiveness, fever.  The patient presents from home where he lives with his mother who is also his guardian.  She had noted a fever of 101.5 with increased work of breathing decreased responsiveness.  EMS was contacted.  The patient was noted to be warm to touch.  He had significant work of breathing.  Oxygen sats were in the mid 80s.  He was placed on a nonrebreather facemask.  Heart rate was noted to be tachycardic with heart rates in the 130s to 140s.  The patient unable to verbalize any history for baseline, but does follow commands.  The patient was brought to [facility] for further assessment.     In the  Emergency Room, the patient was seen by Dr. Jake Melendez.  The patient was COVID negative.  CT showed no PE, but evidence of aspiration so he was given IV fluids, Tylenol.  His heart rate did come down from 140s to 90s.  He had a CT, which showed bilateral lower lobe pneumonia, plus a small amount of free air and extraluminal gas in the right upper quadrant.  Initially General Surgery was contacted, who deferred to colorectal surgery who graciously came and saw the patient.  It was noted that the patient has a chronic GJ tube, which was recently replaced on 11/17/2021.  The patient was examined and noted that his abdominal exam was benign.  The recommendation was to keep the patient n.p.o. with serial abdominal exams and that this free air may be a result of the GJ tube exchange.  The patient did receive Solu-Cortef, noting due to his panhypopituitarism he is on hydrocortisone.  The patient received lactated Ringer bolus and current infusion as well as Zosyn and vancomycin.       Hospital Course   Keyon Farias was admitted on 12/5/2021.  The following problems were addressed during his hospitalization:    Active Problems:    Acute respiratory failure with hypoxia (H)    Aspiration pneumonitis (H)    Free intraperitoneal air    Septic shock (H)         Recurrent aspiration pneumonia with acute hypoxic respiratory failure and sepsis.  * Multiple hospitalizations for aspiration pneumonia, last in 11/2021. Has chronic GJ-tube.  * Presented 12/5 with fevers and AMS. Noted to be hypoxic by EMS. On initial evaluation 12/5 was febrile to 102.7, tachycardic, tachypneic, sats in 90's on O2; WBC 11.9, lactate 3.4, procalcitonin 0.21, d-dimer 1.9. CT chest 12/6 showed no PE: prominent bilateral lower lobe infiltrates compatible with pneumonitis; also extraluminal air (see below). Required BiPAP in the ED. Started on pip-tazo and vanco 12/5  - clinically much improved, mentation back to baseline, fever trended down, afebrile  since 12/7;  WBC 11.9--->10.3, lactic acid 4.4---3.8, pro jamel 0.21  - blood cultures from 12/5 with no growth so far  - discontinued vancomycin 12/8; will switch Zosyn to Augmentin 875 mg BID through feeding tube (12/9)  - will continue PTA Mucomyst with albuterol nebs 4 times daily  - initially requiring Bipap, oxygen now weaned down to room air  -complete antibiotics with Augmentin      Intraperitoneal free air/gas, possibly related to recent GJ-tube exchange.  * CT chest 12/6 also showed extraluminal air is seen in the right upper quadrant around the colon, noted this was of indeterminate etiology but concerning for potential bowel perforation.   - CT abdomen and pelvis 12/6 showed small amount of free intraperitoneal gas as well as extraluminal gas in the fat of the abdomen; noted that this extraluminal gas appeared concentrated in the right upper quadrant near the cecum and ascending colon; no specific bowel abnormality was evident.   - Evaluated by CRS in the ED and abdomen was benign and felt that CT findings could be related to recent GJ-tube replacement (11/16).   - abdomen benign; clinically stable; resumed Tube feeds 12/7 and tolerating well     Thrombocytopenia  Chronic Anemia  - noted drop in platelet 187---121---100  - also Hb 15.7---11.4---10.6; was likely hem-concentrated on admission; baseline hemoglobin around 11 to 12  - d/kevin Zosyn on 12/9 as above  -no suggestion of active bleed; hemodynamically stable;  -CBC with primary care provider    Hyponatremia:   Last Na 140 12/8. Unclear trajectory. On free water flushes.   --pt was receiving zosyn with high Na load and LR until 2 days ago   - on usual dose of carbamazepine    Sodium level [phone] today   Pt was getting free water flushes at 200ml q 4hours and as per his mother his tube flushes are usually at 60-100ml q 4hours  Reduced free water flushes to 100ml q 4hours and sodium improved to 130 again   Will recheck BMP in 1week  with results to PCP        TBI with aphasia, dysphagia, and R sided spastic hemiplegia.   Dysphagia with GJ-tube for TFs.  * Patient's mother is his caregiver, along with home care attendants. Has frequent hospitalization for recurrent pneumonias.   * GJ-tube replaced 11/16.  * Issues with free intraperitoneal air/gas as above. TF's held on admit--resumed 12/7  - continue PTA scheduled metoclopramide .     Seizure disorder.  Secondary to TBI. 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     GERD.  - Continue PTA PO Protonix through feeding tube            Significant Results and Procedures   GJ tube exchange         Unresulted Labs Ordered in the Past 30 Days of this Admission     No orders found from 11/5/2021 to 12/6/2021.          Code Status   Full Code       Primary Care Physician   Carlos Gomez    Physical Exam   Temp: 98.8  F (37.1  C) Temp src: Axillary BP: 107/66 Pulse: 70   Resp: 18 SpO2: 95 % O2 Device: None (Room air)    Vitals:    12/09/21 0300 12/09/21 2200 12/11/21 0602   Weight: 75 kg (165 lb 5.5 oz) 79 kg (174 lb 2.6 oz) 75.2 kg (165 lb 12.6 oz)     Vital Signs with Ranges  Temp:  [97.8  F (36.6  C)-98.8  F (37.1  C)] 98.8  F (37.1  C)  Pulse:  [64-70] 70  Resp:  [18-20] 18  BP: (107-128)/(54-66) 107/66  SpO2:  [93 %-95 %] 95 %  I/O last 3 completed shifts:  In: 1780 [NG/GT:1780]  Out: 1000 [Urine:1000]    Constitutional: alert, awake, NAD   Eyes: no conjuctival injection or icterus present   ENT: oropharyngeal mucosa normal   Respiratory: bibasilar crackles heard , no wheezing   Cardiovascular: RRR, no murmur   GI: abdomen soft, NT, ND, Gj tube in place   Skin: warm and dry   Musculoskeletal: no clubbing, cyanosis or edema   Neurologic: alert, awake, no  focal weakness   Neuropsychiatric: pleasant affect     Discharge Disposition   Discharged to home  Condition at discharge: Stable    Consultations This Hospital Stay   PHARMACY TO DOSE VANCO  COLORECTAL SURGERY IP CONSULT  PHARMACY IP CONSULT  PHARMACY TO DOSE VANCO  COLORECTAL SURGERY IP CONSULT  PHARMACY IP CONSULT  WOUND OSTOMY CONTINENCE NURSE  IP CONSULT  CARE MANAGEMENT / SOCIAL WORK IP CONSULT  PHARMACY IP CONSULT    Time Spent on this Encounter   Starr DYKES MD, personally saw the patient today and spent greater than 30 minutes discharging this patient.    Discharge Orders      Medication Therapy Management Referral      Home care nursing referral      Home Care PT Referral for Hospital Discharge      Home Care OT Referral for Hospital Discharge      Reason for your hospital stay    Pneumonia     Activity    Your activity upon discharge: activity as tolerated     Tubes and drains    You are going home with the following tubes or drains: feeding tube G-Tube.   Tube cares per hospital or home care instructions     Follow-up and recommended labs and tests     Follow up with primary care provider, Carlos Gomez, within 7 days for hospital follow- up. With BMP, magnesium and phosphorus level and CBC with platelet count.     Chest X-ray in 4 weeks.     Diet    Follow this diet upon discharge: Orders Placed This Encounter      Adult Formula Drip Feeding: Continuous Jevity 1.5; Jejunostomy; Goal Rate: 55; mL/hr; Medication - Feeding Tube Flush Frequency: At least 15-30 mL water before and after medication administration and with tube clogging      NPO for Medical/Clinical Reasons Except for: No Exceptions     Discharge Medications   Current Discharge Medication List      START taking these medications    Details   amoxicillin-clavulanate (AUGMENTIN) 400-57 MG/5ML suspension 10.9 mLs (875 mg) by Per Feeding Tube route 2 times daily for 3 days  Qty: 65.4 mL, Refills: 0    Associated Diagnoses: Severe sepsis (H)          CONTINUE these medications which have NOT CHANGED    Details   acetaminophen (TYLENOL 8 HOUR) 650 MG CR tablet Take 650 mg by mouth every 8 hours as needed for mild pain or fever      acetylcysteine (MUCOMYST) 20 % neb solution Take 2 mLs by nebulization 4 times daily With albuterol at 0700, 1100, 1500, and 1900       albuterol (PROVENTIL) (5 MG/ML) 0.5% neb solution Take 2.5 mg by nebulization every 4 hours (while awake) 0700 1100 1500 1900 with mucomyst       aspirin (ASA) 81 MG chewable tablet 81 mg by Oral or Feeding Tube route daily At 0900      bacitracin ointment Apply topically daily as needed for wound care To PEG site.       Brivaracetam (BRIVIACT) 10 MG/ML solution 100 mg by Oral or Feeding Tube route 2 times daily 0900, 2100      calcium carbonate 1250 MG/5ML SUSP suspension Take 1,250 mg by mouth 2 times daily 0900, 2100      !! carBAMazepine (TEGRETOL) 100 MG/5ML suspension Take 100 mg by mouth daily Take at 1800       !! carBAMazepine (TEGRETOL) 100 MG/5ML suspension 150 mg by Oral or Feeding Tube route 3 times daily At 06:00, 12:00, and 24:00 for seizures      clotrimazole-betamethasone (LOTRISONE) 1-0.05 % external cream Apply topically 2 times daily as needed   Qty:        hydrocortisone (CORTEF) 5 MG tablet Take 15 mg (3 tablets) in the morning and 5 mg (1 tablet)  at 2:00 PM. During illness patient takes more as a stress dose. Please increase the dose as directed.  Qty: 400 tablet, Refills: 3    Associated Diagnoses: Secondary adrenal insufficiency (H)      hydrocortisone 1 % CREA cream Place rectally 2 times daily as needed for other Apply to reddened memo areas as needed      levothyroxine (SYNTHROID/LEVOTHROID) 150 MCG tablet Take 1 tablet (150 mcg) by mouth daily  Qty: 90 tablet, Refills: 3    Associated Diagnoses: Secondary hypothyroidism      metoclopramide (REGLAN) 10 MG/10ML SOLN solution Take 10 mg by mouth 4 times daily (before meals and nightly) 0800, 1200, 1600,  2000  Disconnects bag before administration, then waits 45 mins before reconnecting after giving the medication      multivitamin, therapeutic (THERA-VIT) TABS tablet Take 1 tablet by mouth daily      mupirocin (BACTROBAN) 2 % external ointment Apply topically 2 times daily as needed       pantoprazole (PROTONIX) 2 mg/mL SUSP suspension 20 mLs (40 mg) by Per J Tube route daily  Qty: 400 mL, Refills: 0    Comments: Future refills by PCP Dr. Carlos Gomez with phone number [phone].  Associated Diagnoses: Gastroesophageal reflux disease, esophagitis presence not specified      potassium & sodium phosphates (NEUTRA-PHOS) 280-160-250 MG Packet Take 1 packet by mouth 3 times daily       Scopolamine HBr POWD Dispense #90. Mix contents with small amount of water for admin via J-tube.  Administer 0.8 mg three times each day.  Qty: 450 g, Refills: 1    Associated Diagnoses: Aspiration pneumonia of both lungs due to gastric secretions, unspecified part of lung (H)      Skin Protectants, Misc. (BALMEX SKIN PROTECTANT) OINT Externally apply topically 2 times daily as needed (irritation) Applay to reddened memo areas twice daily as needed      sodium bicarbonate 650 MG tablet Take 1 tablet (650 mg) by mouth daily  Qty: 30 tablet, Refills: 0    Associated Diagnoses: Hyponatremia      testosterone cypionate (DEPOTESTOSTERONE) 200 MG/ML injection Inject 0.3 mLs (60 mg) into the muscle every 7 days  Qty: 6 mL, Refills: 1    Associated Diagnoses: Panhypopituitarism (H); Hypogonadism male      vitamin C (ASCORBIC ACID) 1000 MG TABS 1,000 mg by Oral or Feeding Tube route daily       vitamin D3 (CHOLECALCIFEROL) 2000 units (50 mcg) tablet Take 2,000 Units by mouth daily Crush and feed via j-tube @@ 0900       !! - Potential duplicate medications found. Please discuss with provider.        Allergies   Allergies   Allergen Reactions     Valproic Acid Other (See Comments)     Toxicity w/ bone marrow suspension, elevated ammonia levels       Dilantin [Phenytoin Sodium] Other (See Comments)     Severe Trembling     Scopolamine Hives     Hives with the patch - oral no problem     Data   Most Recent 3 CBC's:Recent Labs   Lab Test 12/10/21  1219 12/08/21 1952 12/07/21 0455   WBC 7.5 5.5 10.3   HGB 12.1* 10.6* 11.4*   MCV 91 94 96    100* 121*      Most Recent 3 BMP's:  Recent Labs   Lab Test 12/09/21  1042 12/08/21 1952 12/07/21 0455 12/05/21 2227 12/05/21 2226   NA  --  140 145*  --  139   POTASSIUM  --  4.9 3.8  --  4.5   CHLORIDE  --  108 116*  --  106   CO2  --  28 27  --  27   BUN  --  9 10  --  16   CR  --  0.79 0.90  --  1.24   ANIONGAP  --  4 2*  --  6   STEVE  --  7.4* 8.3*  --  8.8   * 127* 159*   < > 122*    < > = values in this interval not displayed.     Most Recent 2 LFT's:  Recent Labs   Lab Test 12/07/21 0455 12/05/21 2226   AST 37 22   ALT 22 24   ALKPHOS 66 109   BILITOTAL 0.6 0.7     Most Recent INR's and Anticoagulation Dosing History:  Anticoagulation Dose History     Recent Dosing and Labs Latest Ref Rng & Units 1/30/2017 2/7/2017 1/1/2020 10/25/2020 10/25/2020 10/26/2020 12/5/2021    INR 0.85 - 1.15 1.32(H) 1.27(H) 1.28(H) 1.89(H) 1.86(H) 1.82(H) 1.28(H)        Most Recent 3 Troponin's:  Recent Labs   Lab Test 04/15/21  2250 02/19/21  1123 10/25/20  2100   TROPI <0.015 <0.015 0.047*     Most Recent Cholesterol Panel:  Recent Labs   Lab Test 11/29/16  0430   TRIG 100     Most Recent 6 Bacteria Isolates From Any Culture (See EPIC Reports for Culture Details):  Recent Labs   Lab Test 05/23/21  0316 05/23/21  0250 04/15/21  2250 02/22/21  1622 02/22/21  1439 02/22/21  1413   CULT No growth No growth No growth No growth No growth No growth     Most Recent TSH, T4 and A1c Labs:  Recent Labs   Lab Test 05/23/21  0250 04/22/21  1214 11/22/18  1438 07/05/18  0021   TSH  --   --   --  <0.01*   T4  --  1.40   < > 1.66*   A1C 5.6  --    < >  --     < > = values in this interval not displayed.     Results for orders placed  or performed during the hospital encounter of 12/05/21   CT Chest Pulmonary Embolism w Contrast    Narrative    EXAM: CT CHEST PULMONARY EMBOLISM W CONTRAST  LOCATION: [facility]  DATE/TIME: 12/5/2021 11:47 PM    INDICATION: Decreased responsiveness and fever.  COMPARISON: 4/16/2021.  TECHNIQUE: CT chest pulmonary angiogram during arterial phase injection of IV contrast. Multiplanar reformats and MIP reconstructions were performed. Dose reduction techniques were used.   CONTRAST: 80 mL Isovue-370.    FINDINGS:  ANGIOGRAM CHEST: Pulmonary arteries are normal caliber and negative for pulmonary emboli. Thoracic aorta is negative for dissection. No CT evidence of right heart strain.    LUNGS AND PLEURA: Prominent bilateral lower lobe infiltrate/consolidation, right greater than left with an appearance compatible with acute pneumonitis. Appearance and distribution would not be typical for COVID-19 pneumonitis but not completely   excluded. Aspiration pneumonitis could potentially give this appearance. Clinical correlation. No pleural effusions.    MEDIASTINUM/AXILLAE: Few hilar and mediastinal nodes are favored to be reactive in nature. Normal heart size. No pericardial effusion. Normal caliber thoracic aorta. Esophagus is grossly negative.    CORONARY ARTERY CALCIFICATION: None.    UPPER ABDOMEN: Small cyst in the left hepatic lobe. Gallbladder appears absent. Percutaneous gastrojejunostomy tube partially visualized. Small amounts of extraluminal air seen in the right upper quadrant about the colon of uncertain significance or   etiology. Bowel perforation cannot be excluded. Dedicated CT of the abdomen and pelvis suggested for further evaluation.    MUSCULOSKELETAL: Normal.      Impression    IMPRESSION:  1.  Negative for pulmonary embolism.    2.  Prominent bilateral lower lobe infiltrates compatible with pneumonitis. Appearance and distribution would not be typical for COVID-19 pneumonitis  but not completely excluded. Aspiration pneumonitis could have this appearance. Clinical correlation.    3.  Extraluminal air is seen in the right upper quadrant around the colon. This is of indeterminate etiology but concerning for potential bowel perforation. Dedicated abdomen and pelvis CT would be helpful in further evaluation.    Findings called to Dr. Melendez 12/6/2021 12:51 AM.   CT Abdomen Pelvis w Contrast    Narrative    EXAM: CT ABDOMEN PELVIS W CONTRAST  LOCATION: [facility]  DATE/TIME: 12/6/2021 1:27 AM    INDICATION: Abdominal pain and fever. Free air in the upper abdomen on chest CT.  COMPARISON: Chest CT 12/5/2021. CAP 1/15/2021.  TECHNIQUE: CT scan of the abdomen and pelvis was performed following injection of IV contrast. Multiplanar reformats were obtained. Dose reduction techniques were used.  CONTRAST: 79 mL Isovue-370.    FINDINGS:   LOWER CHEST: Bilateral lower lobe infiltrates, right greater than left.    HEPATOBILIARY: The gallbladder is absent. Small cyst in the left lobe of the liver.    PANCREAS: 1.9 cm cyst in the pancreas tail has decreased in size in the interval.    SPLEEN: Normal.    ADRENAL GLANDS: Normal.    KIDNEYS/BLADDER: No hydronephrosis. Catheter in the urinary bladder.    BOWEL: There is a large amount of stool in the rectum. GJ tube in place. There is no bowel obstruction or inflammation. There is a small amount of free intraperitoneal air and several gas collections in the fat of the abdomen. Extraluminal gas   collections appear to be concentrated in the right upper quadrant near the cecum which is located in the right upper quadrant. The appendix is not identified though there appear to be postoperative changes of the cecum suggesting previous appendectomy.    LYMPH NODES: Normal.    VASCULATURE: Atherosclerotic calcification of the aorta and its branches. No aneurysm.    PELVIC ORGANS: Normal.    MUSCULOSKELETAL: Mild degenerative disease in  the spine.      Impression    IMPRESSION:   1.  Small amount of free intraperitoneal gas as well as extraluminal gas in the fat of the abdomen. This extraluminal gas appears concentrated in the right upper quadrant near the cecum and ascending colon. No specific bowel abnormality is evident.  2.  Large amount of stool in the rectum. No bowel obstruction.  3.  Bilateral lower lobe infiltrates suspicious for pneumonia.

## 2021-12-11 NOTE — PLAN OF CARE
Summary: Aspiration pneumonia, sepsis  DATE & TIME: 12/10/21, 4383-2922   Cognitive Concerns/ Orientation : Alert and HECTOR for orientation due to severe aphasia, speech non verbal/incomprehensive sound.   BEHAVIOR & AGGRESSION TOOL COLOR: Green  ABNL VS/O2: VSS, RA  MOBILITY: Assist of 2  T&R q 2hrs  PAIN MANAGMENT:  Pain=0 per FLACC scale  DIET: Strict NPO. TF running @ goal rate of 55ml/hr. Free water flush of 200cc q 4hrs.   BOWEL/BLADDER: Incontinent of bowel/bladder. Last BM  x3 on 12/10/21.   ABNL LAB/BG:   DRAIN/DEVICES:G/ J tube  patent, PIV DC'd per previous shift, OK to leave out per MD  TELEMETRY RHYTHM: NA  SKIN: pale. Mepilex on coccyx, changed dressing at 1530.   TESTS/PROCEDURES: none  D/C DATE: discharge  DC'd for today, possibly tomorrow  Discharge Barriers: none  OTHER IMPORTANT INFO: hx of right sided spastic hemiplegia. Right arm brace in place. Infrequent congested cough, lung sounds coarse with scattered rhonchi. Suctioned orally x1 with Yankauer suction with white sputum returns. Pt's mother aware of discontinue cancelled today.

## 2021-12-11 NOTE — PROVIDER NOTIFICATION
MD Notification    Notified Person: MD    Notified Person Name: Dr. Jenkins    Notification Date/Time:12-9-21 at 1600  Notification Interaction: web oaged MD    Purpose of Notification:Pt having increased stools this afternoon, x3 in 45 minutes, question safety  to send home under care of his MOther    Orders Received: Cancel Discharge for today    Comments: Scheuled transport cancelled by Care Coordinator.

## 2021-12-11 NOTE — PROGRESS NOTES
[facility]    Hospitalist Progress Note    Assessment & Plan   Recurrent aspiration pneumonia with acute hypoxic respiratory failure and sepsis.  * Multiple hospitalizations for aspiration pneumonia, last in 11/2021. Has chronic GJ-tube.  * Presented 12/5 with fevers and AMS. Noted to be hypoxic by EMS. On initial evaluation 12/5 was febrile to 102.7, tachycardic, tachypneic, sats in 90's on O2; WBC 11.9, lactate 3.4, procalcitonin 0.21, d-dimer 1.9. CT chest 12/6 showed no PE: prominent bilateral lower lobe infiltrates compatible with pneumonitis; also extraluminal air (see below). Required BiPAP in the ED. Started on pip-tazo and vanco 12/5  - clinically much improved, mentation back to baseline, fever trended down, afebrile since 12/7;  WBC 11.9--->10.3, lactic acid 4.4---3.8, pro jamel 0.21  - blood cultures from 12/5 with no growth so far  - discontinued vancomycin 12/8; will switch Zosyn to Augmentin 875 mg BID through feeding tube (12/9)  - will continue PTA Mucomyst with albuterol nebs 4 times daily  - initially requiring Bipap, oxygen now weaned down to room air  -breathing easily   -complete antibiotics with Augmentin      Intraperitoneal free air/gas, possibly related to recent GJ-tube exchange.  * CT chest 12/6 also showed extraluminal air is seen in the right upper quadrant around the colon, noted this was of indeterminate etiology but concerning for potential bowel perforation.   - CT abdomen and pelvis 12/6 showed small amount of free intraperitoneal gas as well as extraluminal gas in the fat of the abdomen; noted that this extraluminal gas appeared concentrated in the right upper quadrant near the cecum and ascending colon; no specific bowel abnormality was evident.   - Evaluated by CRS in the ED and abdomen was benign and felt that CT findings could be related to recent GJ-tube replacement (11/16).   - abdomen benign; clinically stable; resumed Tube feeds 12/7 and tolerating  well  -had 3 large stools 12/10 but no diarrhea since. Appears well. abd soft, NT     Thrombocytopenia-resolved.   Chronic Anemia  - noted drop in platelet 187---121---100---> 168 prior to discharge  - also Hb 15.7---11.4---10.6; was likely hem-concentrated on admission; baseline hemoglobin around 11 to 12  - d/kevin Zosyn on 12/9 as above  -no suggestion of active bleed; hemodynamically stable;  -CBC with primary care provider      TBI with aphasia, dysphagia, and R sided spastic hemiplegia.   Dysphagia with GJ-tube for TFs.  * Patient's mother is his caregiver, along with home care attendants. Has frequent hospitalization for recurrent pneumonias.   * GJ-tube replaced 11/16.  * Issues with free intraperitoneal air/gas as above. TF's held on admit--resumed 12/7  - continue PTA scheduled metoclopramide .  -currently receiving free water flushes 200cc every 4 hours. RN to confirm with pt's mother if this is baseline for him  May need to adjust free water flushes.     Hyponatremia.   Mild.   Last Na 140 12/8. Unclear trajectory. On free water flushes.   --pt was receiving zosyn with high Na load and LR until 2 days ago   - on usual dose of carbamazepine  - getting his solucortef and Na bicarb tab at usual dosing  -has been getting 200cc free water q4 hours (1200cc/day)  - check level at 1600, and 0500, may need level in evening depending on 1600 level.  -per mother pt gets 150cc free water every 3 hours (1200cc/day) which is same daily amount getting in hospital. Has one sodium tab per day    -follow up Na 130 at 1600 - stable.   Genevieve 120 range. Serum Osmol mildly low. Uosm 385  Getting significant amount of free water flushes with freq meds.     Plan:  0500 Na  -reduce free water flushes to 150cc q4 hours and skip next flush, include free water with meds into 150cc free water flushes. Am labs. Discussed with RN  -close bmp follow up outpatient  Call MD if Na <127            Seizure disorder.  Secondary to TBI. 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  -receiving meds.      GERD.  - Continue PTA PO Protonix through feeding tube        Code Status: Full Code    Disposition: expect discharge home with home care and care of Mother tomorrow. Follow Na overnight to determine trajectory of Na    Alvaro Barahona MD  Text Page  (7am to 6pm)  Interval History   Doing well. Tolerating TFs.   No diarrhea.   3 large stools yesterday afternoon so discharge held.   Pt has appeared comfortable.    -Data reviewed today: I reviewed all new labs and imaging results over the last 24 hours. I personally reviewed labs last 24 hours    Physical Exam   Temp: 98.8  F (37.1  C) Temp src: Axillary BP: 107/66 Pulse: 70   Resp: 18 SpO2: 95 % O2 Device: None (Room air)    Vitals:    12/09/21 0300 12/09/21 2200 12/11/21 0602   Weight: 75 kg (165 lb 5.5 oz) 79 kg (174 lb 2.6 oz) 75.2 kg (165 lb 12.6 oz)     Vital Signs with Ranges  Temp:  [97.8  F (36.6  C)-98.8  F (37.1  C)] 98.8  F (37.1  C)  Pulse:  [64-70] 70  Resp:  [18-20] 18  BP: (107-128)/(54-66) 107/66  SpO2:  [93 %-95 %] 95 %  I/O last 3 completed shifts:  In: 1780 [NG/GT:1780]  Out: 1000 [Urine:1000]    Constitutional: Nad, smiling, appears at baseline  Respiratory: Breathing easily. Decreased bibasilar. No course bS  Cardiovascular: RRR no r/g/m  GI: soft, nt, nd  Skin/Integumen: no rash or edema  Neuro: awake, contractures extrem X 4. Moves all 4 extrem. Nonverbal.     Medications     dextrose         acetylcysteine  2 mL Nebulization 4x daily     albuterol  2.5 mg Nebulization Q4H While awake     amoxicillin-clavulanate  875 mg Per Feeding Tube BID     aspirin  81 mg Oral or Feeding Tube Daily     Brivaracetam  100 mg Oral or Feeding Tube BID      calcium carbonate  1,250 mg Oral or G tube BID     carBAMazepine  100 mg Oral or G tube Daily     carBAMazepine  150 mg Oral or Feeding Tube TID     hydrocortisone  15 mg Per Feeding Tube Daily     hydrocortisone  5 mg Oral or Feeding Tube Q24H     levothyroxine  150 mcg Per Feeding Tube QAM AC     metoclopramide  10 mg Per Feeding Tube 4x Daily AC & HS     pantoprazole  40 mg Per J Tube Daily     potassium & sodium phosphates  1 packet Per Feeding Tube TID     sodium bicarbonate  650 mg Per Feeding Tube Daily     sodium chloride (PF)  3 mL Intracatheter Q8H     vitamin C  1,000 mg Oral or Feeding Tube Daily     vitamin D3  50 mcg Per Feeding Tube Daily       Data   Recent Labs   Lab 12/11/21  1106 12/10/21  1219 12/09/21  1042 12/08/21  1952 12/07/21  0455 12/05/21 2227 12/05/21 2226   WBC  --  7.5  --  5.5 10.3  --  11.9*   HGB  --  12.1*  --  10.6* 11.4*  --  15.7   MCV  --  91  --  94 96  --  93   PLT  --  168  --  100* 121*  --  187   INR  --   --   --   --   --   --  1.28*   *  --   --  140 145*  --  139   POTASSIUM 4.1  --   --  4.9 3.8  --  4.5   CHLORIDE 98  --   --  108 116*  --  106   CO2 25  --   --  28 27  --  27   BUN 12  --   --  9 10  --  16   CR 0.67  --   --  0.79 0.90  --  1.24   ANIONGAP 7  --   --  4 2*  --  6   STEVE 8.4*  --   --  7.4* 8.3*  --  8.8   *  --  100* 127* 159*   < > 122*   ALBUMIN  --   --   --   --  2.3*  --  3.2*   PROTTOTAL  --   --   --   --  6.6*  --  8.0   BILITOTAL  --   --   --   --  0.6  --  0.7   ALKPHOS  --   --   --   --  66  --  109   ALT  --   --   --   --  22  --  24   AST  --   --   --   --  37  --  22   LIPASE  --   --   --   --   --   --  529*    < > = values in this interval not displayed.       Imaging:   No results found for this or any previous visit (from the past 24 hour(s)).

## 2021-12-11 NOTE — PROGRESS NOTES
Anticipate discharge home today. Pt did not discharge yesterday, as planned. [facility] BLS set for 1600. PCS faxed.     GILMER Santamaria, [facility]  [phone]  [facility]    Addendum: Per MD, pt not discharging. Ride canceled.

## 2021-12-11 NOTE — PLAN OF CARE
Summary: Aspiration pneumonia, sepsis  DATE & TIME: 12/11/21, 2257-5164  Cognitive Concerns/ Orientation : Alert and HECTOR for orientation due to severe aphasia, speech non verbal/incomprehensive sound.   BEHAVIOR & AGGRESSION TOOL COLOR: Green  ABNL VS/O2: VSS, RA  MOBILITY: Assist of 2  T&R q 2hrs  PAIN MANAGMENT:  Pain=0 per FLACC scale  DIET: Strict NPO. TF running @ goal rate of 55ml/hr. Free water flush of 200cc q 4hrs.   BOWEL/BLADDER: Incontinent of bowel/bladder. Last BM 12/10/21.   ABNL LAB/BG: Na 130, Phos 2.1  DRAIN/DEVICES:G/ J tube  patent, PIV DC'd per previous shift, OK to leave out per MD  TELEMETRY RHYTHM: NA  SKIN: pale. Mepilex on coccyx   TESTS/PROCEDURES: none  D/C DATE: discharge possibly tomorrow  Discharge Barriers: pending recheck of Sodium  OTHER IMPORTANT INFO: hx of right sided spastic hemiplegia. Right arm brace in place. Infrequent congested cough

## 2021-12-11 NOTE — PROVIDER NOTIFICATION
MD Notification    Notified Person: MD Barahona    Notification Date/Time: 12/11/21 1240    Notification Interaction: web based page     Purpose of Notification: Spoke with patients mother who is his guardian and caregiver.  She stated that at home the pt usually gets 150 ml water with meds every 3 hours she also said he gets a sodium tablet in the morning and she adds about 1/8 teaspoon of table salt to his tube feeding once or twice later in the day.     Orders Received: no new order at this time    Comments:

## 2021-12-11 NOTE — PROVIDER NOTIFICATION
MD Notification    Notified Person: MD Barahona    Notification Date/Time: 12/11/21 1050    Notification Interaction: web based paging    Purpose of Notification: Patient's mother who is his guardian called asking if he would be discharged today and if he could have his weekly testosterone injection if he stays in the hospital. Patient has not had any BM overnight or this am.     Orders Received: none at this time    Comments:

## 2021-12-11 NOTE — PLAN OF CARE
[id number]    Keyon had a good evening. VSS on RA. Spontaneous cough, suctioned at times when needed; nebs given and were effective in improving O2 stats and reducing breathing effort. Incontinence of urine multiple times, no loose stools noted on this shift. G/J tube remains patent, continuous Jevity 1.5 feeding at 55 mL/hr, tolerating well. Dressing remains C/D/I. Q2H repos continued to maintain skin integrity. Mepilex to coccyx changed and barrier cream PRN. Anticipate discharge 12/11 or when cleared. Will continue to monitor.

## 2021-12-12 LAB
ANION GAP SERPL CALCULATED.3IONS-SCNC: 5 MMOL/L (ref 3–14)
BUN SERPL-MCNC: 14 MG/DL (ref 7–30)
CALCIUM SERPL-MCNC: 8.8 MG/DL (ref 8.5–10.1)
CHLORIDE BLD-SCNC: 99 MMOL/L (ref 94–109)
CO2 SERPL-SCNC: 25 MMOL/L (ref 20–32)
CORTIS SERPL-MCNC: 0.5 UG/DL (ref 4–22)
CREAT SERPL-MCNC: 0.67 MG/DL (ref 0.66–1.25)
GFR SERPL CREATININE-BSD FRML MDRD: >90 ML/MIN/1.73M2
GLUCOSE BLD-MCNC: 118 MG/DL (ref 70–99)
PHOSPHATE SERPL-MCNC: 2.5 MG/DL (ref 2.5–4.5)
POTASSIUM BLD-SCNC: 4.4 MMOL/L (ref 3.4–5.3)
SODIUM SERPL-SCNC: 128 MMOL/L (ref 133–144)
SODIUM SERPL-SCNC: 129 MMOL/L (ref 133–144)
SODIUM SERPL-SCNC: 129 MMOL/L (ref 133–144)
T4 FREE SERPL-MCNC: 1.04 NG/DL (ref 0.76–1.46)
TSH SERPL DL<=0.005 MIU/L-ACNC: <0.01 MU/L (ref 0.4–4)

## 2021-12-12 PROCEDURE — 999N000157 HC STATISTIC RCP TIME EA 10 MIN

## 2021-12-12 PROCEDURE — 84443 ASSAY THYROID STIM HORMONE: CPT | Performed by: INTERNAL MEDICINE

## 2021-12-12 PROCEDURE — 80048 BASIC METABOLIC PNL TOTAL CA: CPT | Performed by: INTERNAL MEDICINE

## 2021-12-12 PROCEDURE — 84295 ASSAY OF SERUM SODIUM: CPT | Performed by: INTERNAL MEDICINE

## 2021-12-12 PROCEDURE — 84439 ASSAY OF FREE THYROXINE: CPT | Performed by: INTERNAL MEDICINE

## 2021-12-12 PROCEDURE — 99207 PR CDG-MDM COMPONENT: MEETS MODERATE - DOWN CODED: CPT | Performed by: INTERNAL MEDICINE

## 2021-12-12 PROCEDURE — 94640 AIRWAY INHALATION TREATMENT: CPT | Mod: 76

## 2021-12-12 PROCEDURE — 250N000009 HC RX 250: Performed by: HOSPITALIST

## 2021-12-12 PROCEDURE — 250N000013 HC RX MED GY IP 250 OP 250 PS 637: Performed by: INTERNAL MEDICINE

## 2021-12-12 PROCEDURE — 99232 SBSQ HOSP IP/OBS MODERATE 35: CPT | Performed by: INTERNAL MEDICINE

## 2021-12-12 PROCEDURE — 84100 ASSAY OF PHOSPHORUS: CPT | Performed by: INTERNAL MEDICINE

## 2021-12-12 PROCEDURE — 250N000009 HC RX 250: Performed by: INTERNAL MEDICINE

## 2021-12-12 PROCEDURE — 250N000013 HC RX MED GY IP 250 OP 250 PS 637: Performed by: HOSPITALIST

## 2021-12-12 PROCEDURE — 120N000001 HC R&B MED SURG/OB

## 2021-12-12 PROCEDURE — 36415 COLL VENOUS BLD VENIPUNCTURE: CPT | Performed by: INTERNAL MEDICINE

## 2021-12-12 PROCEDURE — 94640 AIRWAY INHALATION TREATMENT: CPT

## 2021-12-12 PROCEDURE — 82533 TOTAL CORTISOL: CPT | Performed by: INTERNAL MEDICINE

## 2021-12-12 RX ADMIN — CALCIUM CARBONATE 1250 MG: 1250 SUSPENSION ORAL at 07:53

## 2021-12-12 RX ADMIN — AMOXICILLIN AND CLAVULANATE POTASSIUM 875 MG: 400; 57 POWDER, FOR SUSPENSION ORAL at 09:02

## 2021-12-12 RX ADMIN — OXYCODONE HYDROCHLORIDE AND ACETAMINOPHEN 1000 MG: 500 TABLET ORAL at 09:01

## 2021-12-12 RX ADMIN — LEVOTHYROXINE SODIUM 150 MCG: 150 TABLET ORAL at 07:08

## 2021-12-12 RX ADMIN — BRIVARACETAM 100 MG: 10 SOLUTION ORAL at 21:05

## 2021-12-12 RX ADMIN — ASPIRIN 81 MG CHEWABLE TABLET 81 MG: 81 TABLET CHEWABLE at 09:01

## 2021-12-12 RX ADMIN — ALBUTEROL SULFATE 2.5 MG: 2.5 SOLUTION RESPIRATORY (INHALATION) at 01:53

## 2021-12-12 RX ADMIN — POTASSIUM & SODIUM PHOSPHATES POWDER PACK 280-160-250 MG 1 PACKET: 280-160-250 PACK at 21:03

## 2021-12-12 RX ADMIN — HYDROCORTISONE 5 MG: 5 TABLET ORAL at 14:07

## 2021-12-12 RX ADMIN — ACETYLCYSTEINE 2 ML: 200 SOLUTION ORAL; RESPIRATORY (INHALATION) at 08:12

## 2021-12-12 RX ADMIN — METOCLOPRAMIDE HYDROCHLORIDE 10 MG: 5 SOLUTION ORAL at 15:50

## 2021-12-12 RX ADMIN — Medication 50 MCG: at 09:01

## 2021-12-12 RX ADMIN — ACETYLCYSTEINE 2 ML: 200 SOLUTION ORAL; RESPIRATORY (INHALATION) at 20:36

## 2021-12-12 RX ADMIN — CALCIUM CARBONATE 1250 MG: 1250 SUSPENSION ORAL at 21:04

## 2021-12-12 RX ADMIN — HYDROCORTISONE 15 MG: 5 TABLET ORAL at 09:01

## 2021-12-12 RX ADMIN — CARBAMAZEPINE 100 MG: 100 SUSPENSION ORAL at 18:35

## 2021-12-12 RX ADMIN — POTASSIUM & SODIUM PHOSPHATES POWDER PACK 280-160-250 MG 1 PACKET: 280-160-250 PACK at 15:50

## 2021-12-12 RX ADMIN — POTASSIUM & SODIUM PHOSPHATES POWDER PACK 280-160-250 MG 1 PACKET: 280-160-250 PACK at 09:01

## 2021-12-12 RX ADMIN — METOCLOPRAMIDE HYDROCHLORIDE 10 MG: 5 SOLUTION ORAL at 21:03

## 2021-12-12 RX ADMIN — CARBAMAZEPINE 150 MG: 100 SUSPENSION ORAL at 06:05

## 2021-12-12 RX ADMIN — ACETYLCYSTEINE 2 ML: 200 SOLUTION ORAL; RESPIRATORY (INHALATION) at 12:01

## 2021-12-12 RX ADMIN — METOCLOPRAMIDE HYDROCHLORIDE 10 MG: 5 SOLUTION ORAL at 07:08

## 2021-12-12 RX ADMIN — SODIUM BICARBONATE 650 MG TABLET 650 MG: at 09:01

## 2021-12-12 RX ADMIN — ACETYLCYSTEINE 2 ML: 200 SOLUTION ORAL; RESPIRATORY (INHALATION) at 15:57

## 2021-12-12 RX ADMIN — BRIVARACETAM 100 MG: 10 SOLUTION ORAL at 09:02

## 2021-12-12 RX ADMIN — ALBUTEROL SULFATE 2.5 MG: 2.5 SOLUTION RESPIRATORY (INHALATION) at 12:01

## 2021-12-12 RX ADMIN — ALBUTEROL SULFATE 2.5 MG: 2.5 SOLUTION RESPIRATORY (INHALATION) at 15:56

## 2021-12-12 RX ADMIN — METOCLOPRAMIDE HYDROCHLORIDE 10 MG: 5 SOLUTION ORAL at 11:53

## 2021-12-12 RX ADMIN — ALBUTEROL SULFATE 2.5 MG: 2.5 SOLUTION RESPIRATORY (INHALATION) at 08:13

## 2021-12-12 RX ADMIN — ALBUTEROL SULFATE 2.5 MG: 2.5 SOLUTION RESPIRATORY (INHALATION) at 20:37

## 2021-12-12 RX ADMIN — CARBAMAZEPINE 150 MG: 100 SUSPENSION ORAL at 11:53

## 2021-12-12 RX ADMIN — Medication 40 MG: at 09:02

## 2021-12-12 RX ADMIN — AMOXICILLIN AND CLAVULANATE POTASSIUM 875 MG: 400; 57 POWDER, FOR SUSPENSION ORAL at 21:04

## 2021-12-12 ASSESSMENT — ACTIVITIES OF DAILY LIVING (ADL)
ADLS_ACUITY_SCORE: 39

## 2021-12-12 NOTE — PLAN OF CARE
Cognitive Concerns/ Orientation : Alert, HECTOR orientation nonverbal, calm and cooperative.   BEHAVIOR & AGGRESSION TOOL COLOR: Green  ABNL VS/O2: VSS on RA   MOBILITY: Total cares, assist of 2 to turn/repo q2hr.   PAIN MANAGMENT: no signs of pain.   DIET: Strict NPO. TF running @ goal rate of 55ml/hr. Free water flush decreased to 150 cc q 4hrs - Per MD, include water given with meds into free water amount.   BOWEL/BLADDER: Incontinent of bowel/bladder; external catheter in place, changed x1 overnight due to leaking; no BM overnight.   ABNL LAB/B labs: Na 129, Phos 2.5.   DRAIN/DEVICES: No IV access; G/J tube  patent, continues feeds to J port, meds given in G port, clamped.   TELEMETRY RHYTHM: n/a  SKIN: Pale, scattered bruises. Blanchable redness to coccyx, Mepilex in place.   TESTS/PROCEDURES: none  D/C DATE: possibly home today, pending labs.   Discharge Barriers: pending recheck of Sodium  OTHER IMPORTANT INFO: hx of right sided spastic hemiplegia. Right arm brace in place. LS coarse, infrequent congested cough.

## 2021-12-12 NOTE — PROGRESS NOTES
[facility]    Hospitalist Progress Note    Assessment & Plan   Recurrent aspiration pneumonia with acute hypoxic respiratory failure and sepsis.  * Multiple hospitalizations for aspiration pneumonia, last in 11/2021. Has chronic GJ-tube.  * Presented 12/5 with fevers and AMS. Noted to be hypoxic by EMS. On initial evaluation 12/5 was febrile to 102.7, tachycardic, tachypneic, sats in 90's on O2; WBC 11.9, lactate 3.4, procalcitonin 0.21, d-dimer 1.9. CT chest 12/6 showed no PE: prominent bilateral lower lobe infiltrates compatible with pneumonitis; also extraluminal air (see below). Required BiPAP in the ED. Started on pip-tazo and vanco 12/5  - clinically much improved, mentation back to baseline, fever trended down, afebrile since 12/7;  WBC 11.9--->10.3, lactic acid 4.4---3.8, pro jamel 0.21  - blood cultures from 12/5 with no growth so far  - discontinued vancomycin 12/8; will switch Zosyn to Augmentin 875 mg BID through feeding tube (12/9)  - will continue PTA Mucomyst with albuterol nebs 4 times daily  - initially requiring Bipap, oxygen now weaned down to room air  -breathing easily   -complete antibiotics with Augmentin      Intraperitoneal free air/gas, possibly related to recent GJ-tube exchange.  * CT chest 12/6 also showed extraluminal air is seen in the right upper quadrant around the colon, noted this was of indeterminate etiology but concerning for potential bowel perforation.   - CT abdomen and pelvis 12/6 showed small amount of free intraperitoneal gas as well as extraluminal gas in the fat of the abdomen; noted that this extraluminal gas appeared concentrated in the right upper quadrant near the cecum and ascending colon; no specific bowel abnormality was evident.   - Evaluated by CRS in the ED and abdomen was benign and felt that CT findings could be related to recent GJ-tube replacement (11/16).   - abdomen benign; clinically stable; resumed Tube feeds 12/7 and tolerating  well  -had 3 large stools 12/10 but no diarrhea since. Appears well. abd soft, NT     Thrombocytopenia-resolved.   Chronic Anemia  - noted drop in platelet 187---121---100---> 168 prior to discharge  - also Hb 15.7---11.4---10.6; was likely hem-concentrated on admission; baseline hemoglobin around 11 to 12  - d/kevin Zosyn on 12/9 as above  -no suggestion of active bleed; hemodynamically stable;  -CBC with primary care provider      TBI with aphasia, dysphagia, and R sided spastic hemiplegia.   Dysphagia with GJ-tube for TFs.  * Patient's mother is his caregiver, along with home care attendants. Has frequent hospitalization for recurrent pneumonias.   * GJ-tube replaced 11/16.  * Issues with free intraperitoneal air/gas as above. TF's held on admit--resumed 12/7  - continue PTA scheduled metoclopramide .  -currently receiving free water flushes 200cc every 4 hours. RN to confirm with pt's mother if this is baseline for him  May need to adjust free water flushes.     Hyponatremia.   Mild.   Last Na 140 12/8. Unclear trajectory. On free water flushes.   --pt was receiving zosyn with high Na load and LR until 2 days ago   - on usual dose of carbamazepine  - getting his solucortef and Na bicarb tab at usual dosing  -has been getting 200cc free water q4 hours (1200cc/day)  - check level at 1600, and 0500, may need level in evening depending on 1600 level.  -per mother pt gets 150cc free water every 3 hours (1200cc/day) which is same daily amount getting in hospital. Has one sodium tab per day    12/11: Na stable 130 range.   Genevieve 120 range. Serum Osmol mildly low. Uosm 385  -Getting significant amount of free water flushes with freq meds.   -reduced free water flushes to 150cc q4 hours and skipped one flush, -include free water with meds into 150cc free water flushes.    Today; Na   alittle lower. Not clear if included free water with med flushes into 150cc free water boluses.   Now doing this am.   T4 nl, TSH not helpful  in panhypopit   Cortisol low. Unclear if accurately reflect if solu cortef dose appropriate or need stress dose steroids   Pt not seem stressed. Nl vitals  ? SIADH from aspiration pneumonia.   Attempting to restric free water down FT  Plan:   Decrease free water down FT further to 100cc q4 hours (100cc to also include free water with med flushes)   Am labs. Discussed with RN  -close bmp follow up outpatient  Call MD with Na levels  Na q 6 hours for now  Hold discharge.   Cont his PTA sodium bicarb    Discussed with endocrinology. Trial of fluid restriction as above. If Na not improve then may increase solucortef. Appears to have SIADH  Would like to see Na level higher before discharge.   Call Megha Blake on call - [phone], ask for on call endocrinology  Updated pt's moher           Seizure disorder.  Secondary to TBI. 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  -receiving meds.   -low Na. Low cortisol. Unclear if truly reflects if pt receiving adequate solucortef. ? Account for low Na.   Pt with nl vitals. Discussed with gordo fellow Meena Will call back. Considering emperic iv hydrocortisone to see if increases Na.        GERD.  - Continue PTA PO Protonix through feeding tube        Code Status: Full Code    Disposition: expect discharge home with home care and care of Mother tomorrow. Follow Na overnight   Hold discharge. Need Na wnl before discharge.     Alvaro Barahona MD  Text Page  (7am to 6pm)  Interval History   Na down a little  No change in status  Nl vitals.   Holding discharge as Na not yet at baseline    -Data reviewed today: I reviewed all new labs and imaging results over the last 24 hours. I personally reviewed labs last 24 hours    Physical Exam    Temp: 97.9  F (36.6  C) Temp src: Axillary BP: 115/62 Pulse: 80   Resp: 18 SpO2: 96 % O2 Device: None (Room air)    Vitals:    12/09/21 0300 12/09/21 2200 12/11/21 0602   Weight: 75 kg (165 lb 5.5 oz) 79 kg (174 lb 2.6 oz) 75.2 kg (165 lb 12.6 oz)     Vital Signs with Ranges  Temp:  [97.8  F (36.6  C)-97.9  F (36.6  C)] 97.9  F (36.6  C)  Pulse:  [79-86] 80  Resp:  [18-20] 18  BP: (107-118)/(59-62) 115/62  SpO2:  [93 %-96 %] 96 %  I/O last 3 completed shifts:  In: 2433 [NG/GT:2433]  Out: 800 [Urine:800]    Constitutional: Nad, smiling again today appears at baseline  Respiratory: Breathing easily. Decreased bibasilar. No course bS  Cardiovascular: RRR no r/g/m  GI: soft, nt, nd  Skin/Integumen: no rash or edema  Neuro: awake, contractures extrem X 4. Moves all 4 extrem. Nonverbal.     Medications     dextrose         acetylcysteine  2 mL Nebulization 4x daily     albuterol  2.5 mg Nebulization Q4H While awake     amoxicillin-clavulanate  875 mg Per Feeding Tube BID     aspirin  81 mg Oral or Feeding Tube Daily     Brivaracetam  100 mg Oral or Feeding Tube BID     calcium carbonate  1,250 mg Oral or G tube BID     carBAMazepine  100 mg Oral or G tube Daily     carBAMazepine  150 mg Oral or Feeding Tube TID     hydrocortisone  15 mg Per Feeding Tube Daily     hydrocortisone  5 mg Oral or Feeding Tube Q24H     levothyroxine  150 mcg Per Feeding Tube QAM AC     metoclopramide  10 mg Per Feeding Tube 4x Daily AC & HS     pantoprazole  40 mg Per J Tube Daily     potassium & sodium phosphates  1 packet Per Feeding Tube TID     sodium bicarbonate  650 mg Per Feeding Tube Daily     sodium chloride (PF)  3 mL Intracatheter Q8H     vitamin C  1,000 mg Oral or Feeding Tube Daily     vitamin D3  50 mcg Per Feeding Tube Daily       Data   Recent Labs   Lab 12/12/21  1148 12/12/21  0556 12/11/21  1548 12/11/21  1106 12/10/21  1219 12/09/21  1042 12/08/21  1952 12/07/21  0455 12/05/21 2227 12/05/21 2226   WBC  --   --   --   --   7.5  --  5.5 10.3  --  11.9*   HGB  --   --   --   --  12.1*  --  10.6* 11.4*  --  15.7   MCV  --   --   --   --  91  --  94 96  --  93   PLT  --   --   --   --  168  --  100* 121*  --  187   INR  --   --   --   --   --   --   --   --   --  1.28*   * 129* 130* 130*  --   --  140 145*  --  139   POTASSIUM  --  4.4  --  4.1  --   --  4.9 3.8  --  4.5   CHLORIDE  --  99  --  98  --   --  108 116*  --  106   CO2  --  25  --  25  --   --  28 27  --  27   BUN  --  14  --  12  --   --  9 10  --  16   CR  --  0.67  --  0.67  --   --  0.79 0.90  --  1.24   ANIONGAP  --  5  --  7  --   --  4 2*  --  6   STEVE  --  8.8  --  8.4*  --   --  7.4* 8.3*  --  8.8   GLC  --  118*  --  135*  --  100* 127* 159*   < > 122*   ALBUMIN  --   --   --   --   --   --   --  2.3*  --  3.2*   PROTTOTAL  --   --   --   --   --   --   --  6.6*  --  8.0   BILITOTAL  --   --   --   --   --   --   --  0.6  --  0.7   ALKPHOS  --   --   --   --   --   --   --  66  --  109   ALT  --   --   --   --   --   --   --  22  --  24   AST  --   --   --   --   --   --   --  37  --  22   LIPASE  --   --   --   --   --   --   --   --   --  529*    < > = values in this interval not displayed.       Imaging:   No results found for this or any previous visit (from the past 24 hour(s)).

## 2021-12-12 NOTE — PLAN OF CARE
Summary: Aspiration pneumonia, sepsis  DATE & TIME: 12/11/21,   Cognitive Concerns/ Orientation : Alert and HECTOR for orientation due to severe aphasia, speech non verbal/incomprehensive sound.   BEHAVIOR & AGGRESSION TOOL COLOR: Green  ABNL VS/O2: VSS, RA  MOBILITY: Assist of 2  T&R q 2hrs  PAIN MANAGMENT:  Pain=0 per FLACC scale  DIET: Strict NPO. TF running @ goal rate of 55ml/hr. Free water flush of 150 cc q 4hrs. Per MD, water given with meds should count as part of the flushes.   BOWEL/BLADDER: Incontinent of bowel/bladder. Last BM 12/10/21.   ABNL LAB/BG: Na 130, Phos 2.1 Recheck NA at 0500.  DRAIN/DEVICES:G/ J tube  patent, PIV DC'd per previous shift, OK to leave out per MD  TELEMETRY RHYTHM: NA  SKIN: pale. Mepilex on coccyx   TESTS/PROCEDURES: none  D/C DATE: discharge possibly tomorrow  Discharge Barriers: pending recheck of Sodium  OTHER IMPORTANT INFO: hx of right sided spastic hemiplegia. Right arm brace in place. Infrequent congested cough, lung sounds coarse

## 2021-12-12 NOTE — PLAN OF CARE
`Summary: Aspiration pneumonia, sepsis  DATE & TIME: 2021 2204-7381  Cognitive Concerns/ Orientation : Alert, HECTOR orientation nonverbal, calm and cooperative.   BEHAVIOR & AGGRESSION TOOL COLOR: Green  ABNL VS/O2: VSS on RA   MOBILITY: Total cares, assist of 2 to turn/repo q2hr.   PAIN MANAGMENT: no signs of pain.   DIET: Strict NPO. TF running @ goal rate of 55ml/hr. Free water flush decreased to 100cc q 4hrs - Per MD, include water given with meds into free water amount.   BOWEL/BLADDER: Incontinent of bowel/bladder; external catheter in place, ABNL LAB/B labs: Na 129, 1148 labs Na 128.   DRAIN/DEVICES: No IV access; G/J tube  patent, continues feeds to J port, meds given in G port, clamped.   TELEMETRY RHYTHM: n/a  SKIN: Pale, scattered bruises. Blanchable redness to coccyx, Mepilex in place.   TESTS/PROCEDURES: none  D/C DATE: possibly home tomorrow, pending labs.   Discharge Barriers: pending recheck of Sodium  OTHER IMPORTANT INFO: hx of right sided spastic hemiplegia. Right arm brace in place. LS coarse, infrequent congested cough.   Per MD: include water to used to mix & flush meds to bolus amount for total of 100 ml every 4 hours

## 2021-12-13 VITALS
HEART RATE: 101 BPM | RESPIRATION RATE: 18 BRPM | DIASTOLIC BLOOD PRESSURE: 59 MMHG | TEMPERATURE: 97.6 F | OXYGEN SATURATION: 92 % | SYSTOLIC BLOOD PRESSURE: 98 MMHG | BODY MASS INDEX: 22.8 KG/M2 | WEIGHT: 165.79 LBS

## 2021-12-13 LAB
ANION GAP SERPL CALCULATED.3IONS-SCNC: 8 MMOL/L (ref 3–14)
BUN SERPL-MCNC: 16 MG/DL (ref 7–30)
CALCIUM SERPL-MCNC: 8.4 MG/DL (ref 8.5–10.1)
CHLORIDE BLD-SCNC: 98 MMOL/L (ref 94–109)
CO2 SERPL-SCNC: 24 MMOL/L (ref 20–32)
CREAT SERPL-MCNC: 0.8 MG/DL (ref 0.66–1.25)
GFR SERPL CREATININE-BSD FRML MDRD: >90 ML/MIN/1.73M2
GLUCOSE BLD-MCNC: 131 MG/DL (ref 70–99)
GLUCOSE BLDC GLUCOMTR-MCNC: 141 MG/DL (ref 70–99)
POTASSIUM BLD-SCNC: 4.2 MMOL/L (ref 3.4–5.3)
SODIUM SERPL-SCNC: 129 MMOL/L (ref 133–144)
SODIUM SERPL-SCNC: 130 MMOL/L (ref 133–144)
SODIUM SERPL-SCNC: 130 MMOL/L (ref 133–144)

## 2021-12-13 PROCEDURE — 84295 ASSAY OF SERUM SODIUM: CPT | Performed by: INTERNAL MEDICINE

## 2021-12-13 PROCEDURE — 36415 COLL VENOUS BLD VENIPUNCTURE: CPT | Performed by: INTERNAL MEDICINE

## 2021-12-13 PROCEDURE — 250N000013 HC RX MED GY IP 250 OP 250 PS 637: Performed by: INTERNAL MEDICINE

## 2021-12-13 PROCEDURE — 999N000157 HC STATISTIC RCP TIME EA 10 MIN

## 2021-12-13 PROCEDURE — 250N000009 HC RX 250

## 2021-12-13 PROCEDURE — 94640 AIRWAY INHALATION TREATMENT: CPT

## 2021-12-13 PROCEDURE — 99239 HOSP IP/OBS DSCHRG MGMT >30: CPT | Performed by: INTERNAL MEDICINE

## 2021-12-13 PROCEDURE — 250N000009 HC RX 250: Performed by: INTERNAL MEDICINE

## 2021-12-13 PROCEDURE — 250N000013 HC RX MED GY IP 250 OP 250 PS 637: Performed by: HOSPITALIST

## 2021-12-13 PROCEDURE — 250N000009 HC RX 250: Performed by: HOSPITALIST

## 2021-12-13 PROCEDURE — 94640 AIRWAY INHALATION TREATMENT: CPT | Mod: 76

## 2021-12-13 RX ORDER — ALBUTEROL SULFATE 0.83 MG/ML
SOLUTION RESPIRATORY (INHALATION)
Status: COMPLETED
Start: 2021-12-13 | End: 2021-12-13

## 2021-12-13 RX ADMIN — SODIUM BICARBONATE 650 MG TABLET 650 MG: at 09:34

## 2021-12-13 RX ADMIN — METOCLOPRAMIDE HYDROCHLORIDE 10 MG: 5 SOLUTION ORAL at 06:48

## 2021-12-13 RX ADMIN — CARBAMAZEPINE 150 MG: 100 SUSPENSION ORAL at 11:49

## 2021-12-13 RX ADMIN — LEVOTHYROXINE SODIUM 150 MCG: 150 TABLET ORAL at 09:34

## 2021-12-13 RX ADMIN — ACETYLCYSTEINE 2 ML: 200 SOLUTION ORAL; RESPIRATORY (INHALATION) at 11:33

## 2021-12-13 RX ADMIN — Medication 40 MG: at 09:37

## 2021-12-13 RX ADMIN — Medication 50 MCG: at 09:38

## 2021-12-13 RX ADMIN — ASPIRIN 81 MG CHEWABLE TABLET 81 MG: 81 TABLET CHEWABLE at 09:33

## 2021-12-13 RX ADMIN — ACETYLCYSTEINE 2 ML: 200 SOLUTION ORAL; RESPIRATORY (INHALATION) at 07:42

## 2021-12-13 RX ADMIN — ALBUTEROL SULFATE 2.5 MG: 2.5 SOLUTION RESPIRATORY (INHALATION) at 07:42

## 2021-12-13 RX ADMIN — CARBAMAZEPINE 150 MG: 100 SUSPENSION ORAL at 00:35

## 2021-12-13 RX ADMIN — HYDROCORTISONE 15 MG: 5 TABLET ORAL at 09:34

## 2021-12-13 RX ADMIN — AMOXICILLIN AND CLAVULANATE POTASSIUM 875 MG: 400; 57 POWDER, FOR SUSPENSION ORAL at 09:38

## 2021-12-13 RX ADMIN — POTASSIUM & SODIUM PHOSPHATES POWDER PACK 280-160-250 MG 1 PACKET: 280-160-250 PACK at 09:34

## 2021-12-13 RX ADMIN — BRIVARACETAM 100 MG: 10 SOLUTION ORAL at 11:48

## 2021-12-13 RX ADMIN — ALBUTEROL SULFATE 2.5 MG: 2.5 SOLUTION RESPIRATORY (INHALATION) at 03:53

## 2021-12-13 RX ADMIN — OXYCODONE HYDROCHLORIDE AND ACETAMINOPHEN 1000 MG: 500 TABLET ORAL at 09:34

## 2021-12-13 RX ADMIN — CARBAMAZEPINE 150 MG: 100 SUSPENSION ORAL at 06:48

## 2021-12-13 RX ADMIN — METOCLOPRAMIDE HYDROCHLORIDE 10 MG: 5 SOLUTION ORAL at 11:48

## 2021-12-13 RX ADMIN — CALCIUM CARBONATE 1250 MG: 1250 SUSPENSION ORAL at 09:37

## 2021-12-13 RX ADMIN — ALBUTEROL SULFATE 2.5 MG: 2.5 SOLUTION RESPIRATORY (INHALATION) at 00:08

## 2021-12-13 RX ADMIN — ALBUTEROL SULFATE 2.5 MG: 2.5 SOLUTION RESPIRATORY (INHALATION) at 11:33

## 2021-12-13 ASSESSMENT — ACTIVITIES OF DAILY LIVING (ADL)
ADLS_ACUITY_SCORE: 39

## 2021-12-13 NOTE — PROGRESS NOTES
Spoke with patients mother Savannah. Discussed transportation set for 1:45 pm via stretcher by [facility].. Appointment for lab draw set for 12/16 at 11 am. Savannah states she will be able to find transportation for patient.  Faxed H/P orders and face sheet faxed to [facility].   Anita Casillas RN  Inpatient Care Coordinator  [facility]  # [phone]

## 2021-12-13 NOTE — PLAN OF CARE
`Summary: Aspiration pneumonia, sepsis  DATE & TIME: 2021   Cognitive Concerns/ Orientation : Alert, HECTOR orientation nonverbal, calm and cooperative.   BEHAVIOR & AGGRESSION TOOL COLOR: Green  ABNL VS/O2: VSS on RA   MOBILITY: Total cares, assist of 2 to turn/repo q2hr.   PAIN MANAGMENT:  FLACC scale -0  DIET: Strict NPO. TF running @ goal rate of 55ml/hr. Free water flush decreased to 100cc q 4hrs - Per MD, include water given with meds into free water amount.   BOWEL/BLADDER: Incontinent of bowel/bladder; external catheter in place, ABNL LAB/B labs: Na 129 at 1800,--Notified on call MD, no new orders.  next lab check is midnight   DRAIN/DEVICES: No IV access; G/J tube  patent, continues feeds to J port, meds given in G port, clamped.   TELEMETRY RHYTHM: n/a  SKIN: Pale, scattered bruises. Blanchable redness to coccyx, Mepilex in place.Reddened rashy area on face. Pt's mother asking if he can get Cortisone cream to rashy area. Note left to MD   TESTS/PROCEDURES: none  D/C DATE: possibly home tomorrow, pending labs.   Discharge Barriers: pending recheck of Sodium  OTHER IMPORTANT INFO: hx of right sided spastic hemiplegia. Right arm brace in place. LS coarse, infrequent congested cough.   Per MD: include water to used to mix & flush meds to bolus amount for total of 100 ml every 4 hours

## 2021-12-13 NOTE — PLAN OF CARE
Summary: Aspiration pneumonia, sepsis  DATE & TIME: 12/12/2021 0838-1507  Cognitive Concerns/ Orientation : Alert, HECTOR orientation nonverbal, calm and cooperative.   BEHAVIOR & AGGRESSION TOOL COLOR: Green  ABNL VS/O2: VSS on RA   MOBILITY: Total cares, assist of 2 to turn/repo q2hr.   PAIN MANAGMENT:  FLACC scale -0  DIET: Strict NPO. TF running @ goal rate of 55ml/hr. Free water flush decreased to 100cc q 4hrs - Per MD, include water given with meds into free water amount.   BOWEL/BLADDER: Incontinent of bowel/bladder; external catheter in place, ABNL LAB/BG: See results for q6 Na checks  DRAIN/DEVICES: No IV access; G/J tube  patent, continues feeds to J port, meds given in G port, clamped.   TELEMETRY RHYTHM: n/a  SKIN: Pale, scattered bruises. Blanchable redness to coccyx, Mepilex in place. Reddened rashy area on face. TESTS/PROCEDURES: none  D/C DATE: possibly home today, pending labs.   Discharge Barriers: pending recheck of Sodium

## 2021-12-13 NOTE — PLAN OF CARE
Summary: Aspiration pneumonia, sepsis  DATE & TIME: 12/13/2021 7 a to 3 p  Cognitive Concerns/ Orientation : Alert, HECTOR orientation nonverbal, calm and cooperative.   BEHAVIOR & AGGRESSION TOOL COLOR: Green  ABNL VS/O2: VSS on RA   MOBILITY: Total cares, assist of 2 to turn/repo q2hr.   PAIN MANAGMENT:  FLACC scale -0  DIET: Strict NPO. TF running @ goal rate of 55ml/hr. Free water flush decreased to 100cc q 4hrs - Per MD, include water given with meds into free water amount.   BOWEL/BLADDER: Incontinent of bowel/bladder; external catheter in place,   DRAIN/DEVICES: No IV access; G/J tube  patent, continues feeds to J port, meds given in G port, clamped.   TELEMETRY RHYTHM: n/a  SKIN: Pale, scattered bruises. Blanchable redness to coccyx, Mepilex in place. Reddened rashy area on face.   TESTS/PROCEDURES: Latest Sodium @ 130  Patient calm and cooperative, VSS on RA, with occasional coughing, Q2 T and Repo check and change, no outward signs of pain, discharge AVS prepared for endorsement to mom.

## 2021-12-13 NOTE — PROGRESS NOTES
Cross cover:     Results for SHEREENHERIBERTO BERT GLADYS (MRN [id number]) as of 12/12/2021 19:40   Ref. Range 12/11/2021 11:06 12/11/2021 15:48 12/12/2021 05:56 12/12/2021 11:48 12/12/2021 18:48   Sodium Latest Ref Range: 133 - 144 mmol/L 130 (L) 130 (L) 129 (L) 128 (L) 129 (L)     Sodium level stable. No change for now as per sign out from day hospitalist rounder.

## 2021-12-13 NOTE — PROVIDER NOTIFICATION
MD Notification    Notified Person: MD    Notified Person Name: Joey    Notification Date/Time:12/13/21 0100    Notification Interaction:text page    Purpose of Notification: FYI pts Sodium is 129    Orders Received:    Comments: having to page after each sodium draw

## 2021-12-14 ENCOUNTER — PATIENT OUTREACH (OUTPATIENT)
Dept: CARE COORDINATION | Facility: CLINIC | Age: 61
End: 2021-12-14
Payer: MEDICARE

## 2021-12-14 DIAGNOSIS — Z71.89 OTHER SPECIFIED COUNSELING: ICD-10-CM

## 2021-12-16 ENCOUNTER — LAB (OUTPATIENT)
Dept: LAB | Facility: CLINIC | Age: 61
End: 2021-12-16
Attending: INTERNAL MEDICINE
Payer: MEDICARE

## 2021-12-16 DIAGNOSIS — N39.0 URINARY TRACT INFECTION WITHOUT HEMATURIA, SITE UNSPECIFIED: ICD-10-CM

## 2021-12-16 LAB — SODIUM SERPL-SCNC: 138 MMOL/L (ref 133–144)

## 2021-12-16 PROCEDURE — 36415 COLL VENOUS BLD VENIPUNCTURE: CPT

## 2021-12-16 PROCEDURE — 84295 ASSAY OF SERUM SODIUM: CPT

## 2021-12-27 ENCOUNTER — HOSPITAL ENCOUNTER (OUTPATIENT)
Facility: CLINIC | Age: 61
Setting detail: OBSERVATION
Discharge: HOME | End: 2021-12-28
Attending: EMERGENCY MEDICINE | Admitting: EMERGENCY MEDICINE
Payer: MEDICARE

## 2021-12-27 ENCOUNTER — APPOINTMENT (OUTPATIENT)
Dept: GENERAL RADIOLOGY | Facility: CLINIC | Age: 61
End: 2021-12-27
Attending: EMERGENCY MEDICINE
Payer: MEDICARE

## 2021-12-27 ENCOUNTER — NURSE TRIAGE (OUTPATIENT)
Dept: NURSING | Facility: CLINIC | Age: 61
End: 2021-12-27
Payer: MEDICARE

## 2021-12-27 DIAGNOSIS — J69.0 ASPIRATION PNEUMONITIS (H): ICD-10-CM

## 2021-12-27 DIAGNOSIS — S06.9X0A TRAUMATIC BRAIN INJURY, WITHOUT LOSS OF CONSCIOUSNESS, INITIAL ENCOUNTER (H): ICD-10-CM

## 2021-12-27 DIAGNOSIS — S06.9X9S TRAUMATIC BRAIN INJURY WITH LOSS OF CONSCIOUSNESS, SEQUELA (H): ICD-10-CM

## 2021-12-27 DIAGNOSIS — J69.0 ASPIRATION PNEUMONIA OF RIGHT LOWER LOBE DUE TO GASTRIC SECRETIONS (H): Primary | ICD-10-CM

## 2021-12-27 DIAGNOSIS — G40.909 SEIZURE DISORDER (H): ICD-10-CM

## 2021-12-27 LAB
ALBUMIN SERPL-MCNC: 3.1 G/DL (ref 3.4–5)
ALBUMIN UR-MCNC: 20 MG/DL
ALP SERPL-CCNC: 115 U/L (ref 40–150)
ALT SERPL W P-5'-P-CCNC: 28 U/L (ref 0–70)
ANION GAP SERPL CALCULATED.3IONS-SCNC: 2 MMOL/L (ref 3–14)
APPEARANCE UR: CLEAR
AST SERPL W P-5'-P-CCNC: 21 U/L (ref 0–45)
BASOPHILS # BLD AUTO: 0.1 10E3/UL (ref 0–0.2)
BASOPHILS NFR BLD AUTO: 1 %
BILIRUB SERPL-MCNC: 0.3 MG/DL (ref 0.2–1.3)
BILIRUB UR QL STRIP: NEGATIVE
BUN SERPL-MCNC: 16 MG/DL (ref 7–30)
CALCIUM SERPL-MCNC: 8.7 MG/DL (ref 8.5–10.1)
CHLORIDE BLD-SCNC: 109 MMOL/L (ref 94–109)
CO2 SERPL-SCNC: 30 MMOL/L (ref 20–32)
COLOR UR AUTO: YELLOW
CREAT SERPL-MCNC: 0.9 MG/DL (ref 0.66–1.25)
EOSINOPHIL # BLD AUTO: 0.6 10E3/UL (ref 0–0.7)
EOSINOPHIL NFR BLD AUTO: 6 %
ERYTHROCYTE [DISTWIDTH] IN BLOOD BY AUTOMATED COUNT: 13 % (ref 10–15)
GFR SERPL CREATININE-BSD FRML MDRD: >90 ML/MIN/1.73M2
GLUCOSE BLD-MCNC: 115 MG/DL (ref 70–99)
GLUCOSE UR STRIP-MCNC: NEGATIVE MG/DL
HCO3 BLDV-SCNC: 28 MMOL/L (ref 21–28)
HCT VFR BLD AUTO: 43.6 % (ref 40–53)
HGB BLD-MCNC: 14 G/DL (ref 13.3–17.7)
HGB UR QL STRIP: NEGATIVE
IMM GRANULOCYTES # BLD: 0 10E3/UL
IMM GRANULOCYTES NFR BLD: 0 %
INR PPP: 1.28 (ref 0.85–1.15)
KETONES UR STRIP-MCNC: NEGATIVE MG/DL
LACTATE BLD-SCNC: 1.1 MMOL/L
LEUKOCYTE ESTERASE UR QL STRIP: NEGATIVE
LYMPHOCYTES # BLD AUTO: 4.7 10E3/UL (ref 0.8–5.3)
LYMPHOCYTES NFR BLD AUTO: 48 %
MCH RBC QN AUTO: 29.9 PG (ref 26.5–33)
MCHC RBC AUTO-ENTMCNC: 32.1 G/DL (ref 31.5–36.5)
MCV RBC AUTO: 93 FL (ref 78–100)
MONOCYTES # BLD AUTO: 0.9 10E3/UL (ref 0–1.3)
MONOCYTES NFR BLD AUTO: 10 %
MUCOUS THREADS #/AREA URNS LPF: PRESENT /LPF
NEUTROPHILS # BLD AUTO: 3.3 10E3/UL (ref 1.6–8.3)
NEUTROPHILS NFR BLD AUTO: 35 %
NITRATE UR QL: NEGATIVE
NRBC # BLD AUTO: 0 10E3/UL
NRBC BLD AUTO-RTO: 0 /100
PCO2 BLDV: 46 MM HG (ref 40–50)
PH BLDV: 7.4 [PH] (ref 7.32–7.43)
PH UR STRIP: 6.5 [PH] (ref 5–7)
PLATELET # BLD AUTO: 200 10E3/UL (ref 150–450)
PO2 BLDV: 38 MM HG (ref 25–47)
POTASSIUM BLD-SCNC: 3.9 MMOL/L (ref 3.4–5.3)
PROT SERPL-MCNC: 7.8 G/DL (ref 6.8–8.8)
RBC # BLD AUTO: 4.68 10E6/UL (ref 4.4–5.9)
RBC URINE: 0 /HPF
SAO2 % BLDV: 72 % (ref 94–100)
SARS-COV-2 RNA RESP QL NAA+PROBE: NEGATIVE
SODIUM SERPL-SCNC: 141 MMOL/L (ref 133–144)
SP GR UR STRIP: 1.03 (ref 1–1.03)
SQUAMOUS EPITHELIAL: 1 /HPF
UROBILINOGEN UR STRIP-MCNC: 4 MG/DL
WBC # BLD AUTO: 9.6 10E3/UL (ref 4–11)
WBC URINE: 1 /HPF

## 2021-12-27 PROCEDURE — 83605 ASSAY OF LACTIC ACID: CPT

## 2021-12-27 PROCEDURE — 81001 URINALYSIS AUTO W/SCOPE: CPT | Performed by: EMERGENCY MEDICINE

## 2021-12-27 PROCEDURE — 250N000011 HC RX IP 250 OP 636: Performed by: EMERGENCY MEDICINE

## 2021-12-27 PROCEDURE — G0378 HOSPITAL OBSERVATION PER HR: HCPCS

## 2021-12-27 PROCEDURE — 99220 PR INITIAL OBSERVATION CARE,LEVEL III: CPT | Performed by: INTERNAL MEDICINE

## 2021-12-27 PROCEDURE — 82040 ASSAY OF SERUM ALBUMIN: CPT | Performed by: EMERGENCY MEDICINE

## 2021-12-27 PROCEDURE — 93005 ELECTROCARDIOGRAM TRACING: CPT

## 2021-12-27 PROCEDURE — 96361 HYDRATE IV INFUSION ADD-ON: CPT

## 2021-12-27 PROCEDURE — 36415 COLL VENOUS BLD VENIPUNCTURE: CPT | Performed by: EMERGENCY MEDICINE

## 2021-12-27 PROCEDURE — 85610 PROTHROMBIN TIME: CPT | Performed by: EMERGENCY MEDICINE

## 2021-12-27 PROCEDURE — 82803 BLOOD GASES ANY COMBINATION: CPT

## 2021-12-27 PROCEDURE — 258N000003 HC RX IP 258 OP 636: Performed by: EMERGENCY MEDICINE

## 2021-12-27 PROCEDURE — 71045 X-RAY EXAM CHEST 1 VIEW: CPT

## 2021-12-27 PROCEDURE — 99285 EMERGENCY DEPT VISIT HI MDM: CPT | Mod: 25

## 2021-12-27 PROCEDURE — 80053 COMPREHEN METABOLIC PANEL: CPT | Performed by: EMERGENCY MEDICINE

## 2021-12-27 PROCEDURE — 87040 BLOOD CULTURE FOR BACTERIA: CPT | Performed by: EMERGENCY MEDICINE

## 2021-12-27 PROCEDURE — 96366 THER/PROPH/DIAG IV INF ADDON: CPT

## 2021-12-27 PROCEDURE — 96365 THER/PROPH/DIAG IV INF INIT: CPT

## 2021-12-27 PROCEDURE — C9803 HOPD COVID-19 SPEC COLLECT: HCPCS

## 2021-12-27 PROCEDURE — 250N000013 HC RX MED GY IP 250 OP 250 PS 637: Performed by: EMERGENCY MEDICINE

## 2021-12-27 PROCEDURE — 250N000013 HC RX MED GY IP 250 OP 250 PS 637: Performed by: INTERNAL MEDICINE

## 2021-12-27 PROCEDURE — 85025 COMPLETE CBC W/AUTO DIFF WBC: CPT | Performed by: EMERGENCY MEDICINE

## 2021-12-27 PROCEDURE — 87635 SARS-COV-2 COVID-19 AMP PRB: CPT | Performed by: EMERGENCY MEDICINE

## 2021-12-27 RX ORDER — CARBAMAZEPINE 100 MG/5ML
100 SUSPENSION ORAL DAILY
Status: DISCONTINUED | OUTPATIENT
Start: 2021-12-28 | End: 2021-12-28 | Stop reason: HOSPADM

## 2021-12-27 RX ORDER — LEVOTHYROXINE SODIUM 75 UG/1
150 TABLET ORAL DAILY
Status: DISCONTINUED | OUTPATIENT
Start: 2021-12-28 | End: 2021-12-28 | Stop reason: HOSPADM

## 2021-12-27 RX ORDER — BENZOCAINE/MENTHOL 6 MG-10 MG
LOZENGE MUCOUS MEMBRANE 2 TIMES DAILY PRN
COMMUNITY

## 2021-12-27 RX ORDER — CARBAMAZEPINE 100 MG/5ML
150 SUSPENSION ORAL 3 TIMES DAILY
Status: DISCONTINUED | OUTPATIENT
Start: 2021-12-28 | End: 2021-12-28 | Stop reason: HOSPADM

## 2021-12-27 RX ORDER — AMPICILLIN AND SULBACTAM 2; 1 G/1; G/1
3 INJECTION, POWDER, FOR SOLUTION INTRAMUSCULAR; INTRAVENOUS EVERY 6 HOURS
Status: DISCONTINUED | OUTPATIENT
Start: 2021-12-28 | End: 2021-12-28

## 2021-12-27 RX ORDER — ACETAMINOPHEN 325 MG/1
650 TABLET ORAL ONCE
Status: COMPLETED | OUTPATIENT
Start: 2021-12-27 | End: 2021-12-27

## 2021-12-27 RX ORDER — HYDROCORTISONE 5 MG/1
5 TABLET ORAL DAILY
Status: DISCONTINUED | OUTPATIENT
Start: 2021-12-28 | End: 2021-12-28 | Stop reason: HOSPADM

## 2021-12-27 RX ORDER — AMPICILLIN AND SULBACTAM 2; 1 G/1; G/1
3 INJECTION, POWDER, FOR SOLUTION INTRAMUSCULAR; INTRAVENOUS ONCE
Status: COMPLETED | OUTPATIENT
Start: 2021-12-27 | End: 2021-12-27

## 2021-12-27 RX ORDER — METOCLOPRAMIDE HYDROCHLORIDE 5 MG/5ML
10 SOLUTION ORAL
Status: DISCONTINUED | OUTPATIENT
Start: 2021-12-28 | End: 2021-12-28 | Stop reason: HOSPADM

## 2021-12-27 RX ADMIN — BRIVARACETAM 100 MG: 10 SOLUTION ORAL at 23:53

## 2021-12-27 RX ADMIN — CARBAMAZEPINE 150 MG: 100 SUSPENSION ORAL at 23:53

## 2021-12-27 RX ADMIN — AMPICILLIN SODIUM AND SULBACTAM SODIUM 3 G: 2; 1 INJECTION, POWDER, FOR SOLUTION INTRAMUSCULAR; INTRAVENOUS at 20:19

## 2021-12-27 RX ADMIN — SODIUM CHLORIDE 1000 ML: 9 INJECTION, SOLUTION INTRAVENOUS at 17:05

## 2021-12-27 RX ADMIN — ACETAMINOPHEN 650 MG: 325 TABLET, FILM COATED ORAL at 17:00

## 2021-12-27 RX ADMIN — ACETAMINOPHEN 650 MG: 325 SUSPENSION ORAL at 16:52

## 2021-12-27 ASSESSMENT — ENCOUNTER SYMPTOMS
FEVER: 1
PALPITATIONS: 1

## 2021-12-27 NOTE — TELEPHONE ENCOUNTER
"    Call originated  To  ED at [facility]    Mother and caregiver is ; reporting patient has fever of 101.3 ax with O2 saturation 87%. ( nl mid 90\"s per mother)  Onset of symptoms this morning. Recent  hospitalization for sepsis  with hypoxia   aspiration pneumonia.  Mother notes increased respiratory effort. increased lethargy.    Triage protocol reviewed  Advised 911   Mother understands and will proceed   Lucia Chung RN  FNA           Reason for Disposition    Difficulty breathing    Additional Information    Negative: Difficult to awaken or acting confused (e.g., disoriented, slurred speech)    Negative: Pale cold skin and very weak (can't stand)    Negative: Difficulty breathing and bluish (or gray) lips or face    Negative: New onset rash with purple (or blood-colored) spots or dots    Negative: Sounds like a life-threatening emergency to the triager    Negative: Fever onset within 24 hours of receiving vaccine    Negative: Fever within 14 days of COVID-19 Exposure    Negative: Pregnant    Negative: Postpartum (from 0 to 6 weeks after delivery)    Negative: Headache and stiff neck (can't touch chin to chest)    Protocols used: FEVER-A-OH      "

## 2021-12-27 NOTE — ED NOTES
Bed: ED20  Expected date: 12/27/21  Expected time: 3:25 PM  Means of arrival: Ambulance  Comments:  EMS

## 2021-12-27 NOTE — ED TRIAGE NOTES
temperature of 101,  reported by mother who called 911.  mental status at baseline  Tylenol x1 at 1430, recent pneumonia finished course of antibiotics

## 2021-12-27 NOTE — ED PROVIDER NOTES
History     Chief Complaint:  Fever.    The history is provided by the EMS personnel. History limited by: nonverbal at baseline.      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 The patient presents to the ED after his mother called for EMS due to a fever of 101, a heart rate of 120, and decreased responsiveness. Keyon recently had pneumonia, for which he has finished his prescribed course of antibiotics. Patient non-verbal at baseline. Received Tylenol x1 at 1430 today.     Review of Systems   Unable to perform ROS: Patient nonverbal   Constitutional: Positive for fever.   Cardiovascular: Positive for palpitations.   All other systems reviewed and are negative.      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   DVT  GERD  Panhypopituitarism  Pneumonia  Seizures  Sepsis due to UTI  Septic shock   Spastic hemiplegia affecting dominant side  Thyroid disease  Tracheostomy care  Ventricular fibrillation  Ventricular tachyarrhythmia   Conjunctivitis of right eye  Encephalopathy  Pneumonia  Cardiac arrest  Acute respiratory failure with hypoxia  Hyponatremia  Appendicitis  Panhypopituitarism     Past Surgical History:    Endoscopic ultrasound upper gastrointestinal tract  EGD, necrosectomy  EGD, combined x3  Head and neck surgery  Gastro jejunostomy tube change x15  PICC exchange left  Laparoscopic appendectomy  Laparoscopic assisted insertion tube gastrotomy  Orthopedic surgery  Tracheostomy x2  Vascular surgery    Family History:    Father: kidney cancer     Social History:  The patient presents to the ED alone  via EMS.     Physical Exam     Patient Vitals for the past 24 hrs:   BP Temp Temp src Pulse Resp SpO2   12/27/21 2015 -- -- -- -- -- 96 %   12/27/21 2000 117/72 -- -- 81 -- 96 %   12/27/21 1945 119/73 -- -- -- -- 97 %   12/27/21 1900 -- -- -- -- -- 96 %   12/27/21 1850 110/74 98.9  F (37.2  C) Oral -- -- 97 %   12/27/21 1820 -- -- -- -- -- 97 %   12/27/21 1800 -- -- -- -- -- 94 %   12/27/21 1730 -- -- -- -- -- 94 %   12/27/21 1710 -- -- -- -- -- 91 %   12/27/21 1600 -- -- -- -- -- 95 %   12/27/21 1536 108/64 (!) 100.9  F (38.3  C) Oral 117 24 92 %       Physical Exam  Constitutional: Middle aged white male, supine. No respiratory distress.   HENT: No signs of trauma. Oropharynx dry.   Eyes: EOM are normal. Pupils are equal, round, reactive to light, and 4 mm.  Neck: Normal range of motion. No JVD present. No cervical adenopathy.  Cardiovascular: Regular rhythm.  Exam reveals no gallop and no friction rub.    No murmur heard.  Pulmonary/Chest: Bilateral breath sounds normal. Clear but diminished breath sounds on right side.   Abdominal: Soft. No tenderness. No rebound or guarding. Feeding tube at the LUQ.   Musculoskeletal: No edema. No tenderness. Trace edema.   Lymphadenopathy: No lymphadenopathy.   Neurological: Awake, alert, nonverbal. Attempts to assist with exam. String  on the left side, weak on the right. Unable to lift either leg off bed. Unable to lift arms into the air. Coordination not tested.   Skin: Skin is warm and dry. No rash noted. No erythema.     Emergency Department Course   ECG:  ECG taken at 1922, ECG read at 1926  Normal sinus rhythm. Left axis deviation. Abnormal ECG.    Rate 76 bpm. VT interval 164 ms. QRS duration 92 ms. QT/QTc 412/463 ms. P-R-T axes 61 -37 45.     Imaging:  XR Chest Port 1 View  Minimal change. Right hemidiaphragm remains elevated. There is mild interstitial prominence at lung bases but no new focal pneumonia. Heart size normal.  As per radiology.     Laboratory:  CBC:  WBC 9.6, HGB 14.0,      CMP: anion gap: 2, glucose: 115 o/w, albumin: 3.1 WNL (Creatinine 0.90)     iStat Gases (lactate) venous: O2: 72 o/w WNL    INR: 1.28    UA with microscopic: protein albumin: 20, urobilinogen: 4.0, mucus: present, RBC: 0, WBC: 1 o/w WNL     Blood Culture: PENDING    Symptomatic Influenza A/B & SARS-CoV2 (COVID19) Virus PCR Multiplex: Negative     Emergency Department Course:     Reviewed:  I reviewed nursing notes, vitals, past history and care everywhere    Assessments:  1556 I obtained history and examined the patient as noted above.     Consults:   1830 I spoke with the patient's mother regarding his condition and what prompted her to call for EMS today.   1910 I spoke with Dr. Fuchs, Hospitalist, regarding the patient, who agreed to admit the patient.         Interventions:  1652 Tylenol 650 mg PO  1700 Tylenol 650 mg PO    1705 NS 1 L IV   2019 Unasyn 3 g IV    Disposition:  The patient was admitted to the hospital under the care of Dr. Fuchs.    Impression & Plan             Medical Decision Making:  Keyon Farias is a 61 year old male who lives with his mother. He has a traumatic brain injury, seizure disorder, cardiac arrhythmias, and stroke. Mom notes that he seemed to be less responsive today, felt febrile, was hypoxic with readings in the 80s, and his pulse was fast, so she sent him in for evaluation. On initial exam, he was warm with a temperature of 101 and pulse of 117. Oxygen saturation was okay at 92%, and his blood pressure was stable. On exam, he had significant rhonchi, particularly in the left chest. He was worked up for sepsis with blood cultures, urine, chest x-ray, and Covid swab. He is coughing in the ED, but refuses to be suctioned deeply. No other acute findings are noted. He has received fluids and Tylenol. His pulse is now under 100. Blood pressure remains good. His temperature has come down. My suspicion is that he probably has mild aspiration pneumonia,  which has not showed up on x ray yet. I have started him empirically on Unasyn, and will bring him in for observation.         Covid-19  Keyon Farias was evaluated during a global COVID-19 pandemic, which necessitated consideration that the patient might be at risk for infection with the SARS-CoV-2 virus that causes COVID-19.   Applicable protocols for evaluation were followed during the patient's care.   COVID-19 was considered as part of the patient's evaluation. The plan for testing is:  a test was obtained during this visit.    Diagnosis:    ICD-10-CM    1. Aspiration pneumonitis (H)  J69.0    2. Traumatic brain injury, without loss of consciousness, initial encounter (H)  S06.9X0A    3. Seizure disorder (H)  G40.909      Scribe Disclosure:  I, Jake Devine, am serving as a scribe at 3:56 PM on 12/27/2021 to document services personally performed by Codey Green MD based on my observations and the provider's statements to me.      Codey Green MD  12/27/21 1839

## 2021-12-28 VITALS
TEMPERATURE: 98 F | RESPIRATION RATE: 22 BRPM | SYSTOLIC BLOOD PRESSURE: 99 MMHG | HEART RATE: 78 BPM | OXYGEN SATURATION: 97 % | DIASTOLIC BLOOD PRESSURE: 55 MMHG

## 2021-12-28 LAB
ATRIAL RATE - MUSE: 76 BPM
DIASTOLIC BLOOD PRESSURE - MUSE: NORMAL MMHG
INTERPRETATION ECG - MUSE: NORMAL
P AXIS - MUSE: 61 DEGREES
PR INTERVAL - MUSE: 164 MS
QRS DURATION - MUSE: 92 MS
QT - MUSE: 412 MS
QTC - MUSE: 463 MS
R AXIS - MUSE: -37 DEGREES
SYSTOLIC BLOOD PRESSURE - MUSE: NORMAL MMHG
T AXIS - MUSE: 45 DEGREES
VENTRICULAR RATE- MUSE: 76 BPM

## 2021-12-28 PROCEDURE — 96366 THER/PROPH/DIAG IV INF ADDON: CPT

## 2021-12-28 PROCEDURE — G0378 HOSPITAL OBSERVATION PER HR: HCPCS

## 2021-12-28 PROCEDURE — 96367 TX/PROPH/DG ADDL SEQ IV INF: CPT

## 2021-12-28 PROCEDURE — 99217 PR OBSERVATION CARE DISCHARGE: CPT | Performed by: HOSPITALIST

## 2021-12-28 PROCEDURE — 250N000013 HC RX MED GY IP 250 OP 250 PS 637: Performed by: INTERNAL MEDICINE

## 2021-12-28 PROCEDURE — 96376 TX/PRO/DX INJ SAME DRUG ADON: CPT

## 2021-12-28 PROCEDURE — 250N000011 HC RX IP 250 OP 636: Performed by: INTERNAL MEDICINE

## 2021-12-28 RX ORDER — AMOXICILLIN AND CLAVULANATE POTASSIUM 400; 57 MG/5ML; MG/5ML
875 POWDER, FOR SUSPENSION ORAL 2 TIMES DAILY
Qty: 130.8 ML | Refills: 0 | Status: SHIPPED | OUTPATIENT
Start: 2021-12-28 | End: 2022-01-03

## 2021-12-28 RX ORDER — SODIUM BICARBONATE 650 MG/1
650 TABLET ORAL DAILY
Status: DISCONTINUED | OUTPATIENT
Start: 2021-12-28 | End: 2021-12-28 | Stop reason: HOSPADM

## 2021-12-28 RX ORDER — ONDANSETRON 2 MG/ML
4 INJECTION INTRAMUSCULAR; INTRAVENOUS EVERY 6 HOURS PRN
Status: DISCONTINUED | OUTPATIENT
Start: 2021-12-28 | End: 2021-12-28 | Stop reason: HOSPADM

## 2021-12-28 RX ORDER — ASPIRIN 81 MG/1
81 TABLET, CHEWABLE ORAL DAILY
Status: DISCONTINUED | OUTPATIENT
Start: 2021-12-28 | End: 2021-12-28 | Stop reason: HOSPADM

## 2021-12-28 RX ORDER — AMOXICILLIN AND CLAVULANATE POTASSIUM 400; 57 MG/5ML; MG/5ML
875 POWDER, FOR SUSPENSION ORAL 2 TIMES DAILY
Status: DISCONTINUED | OUTPATIENT
Start: 2021-12-28 | End: 2021-12-28 | Stop reason: HOSPADM

## 2021-12-28 RX ORDER — PROCHLORPERAZINE 25 MG
25 SUPPOSITORY, RECTAL RECTAL EVERY 12 HOURS PRN
Status: DISCONTINUED | OUTPATIENT
Start: 2021-12-28 | End: 2021-12-28 | Stop reason: HOSPADM

## 2021-12-28 RX ORDER — ACETAMINOPHEN 325 MG/1
650 TABLET ORAL EVERY 8 HOURS PRN
Status: DISCONTINUED | OUTPATIENT
Start: 2021-12-28 | End: 2021-12-28 | Stop reason: HOSPADM

## 2021-12-28 RX ORDER — ACETYLCYSTEINE 200 MG/ML
2 SOLUTION ORAL; RESPIRATORY (INHALATION) 4 TIMES DAILY
Status: DISCONTINUED | OUTPATIENT
Start: 2021-12-28 | End: 2021-12-28 | Stop reason: HOSPADM

## 2021-12-28 RX ORDER — ONDANSETRON 4 MG/1
4 TABLET, ORALLY DISINTEGRATING ORAL EVERY 6 HOURS PRN
Status: DISCONTINUED | OUTPATIENT
Start: 2021-12-28 | End: 2021-12-28 | Stop reason: HOSPADM

## 2021-12-28 RX ORDER — PROCHLORPERAZINE MALEATE 5 MG
10 TABLET ORAL EVERY 6 HOURS PRN
Status: DISCONTINUED | OUTPATIENT
Start: 2021-12-28 | End: 2021-12-28 | Stop reason: HOSPADM

## 2021-12-28 RX ORDER — ALBUTEROL SULFATE 5 MG/ML
2.5 SOLUTION RESPIRATORY (INHALATION)
Status: DISCONTINUED | OUTPATIENT
Start: 2021-12-28 | End: 2021-12-28 | Stop reason: HOSPADM

## 2021-12-28 RX ADMIN — POTASSIUM & SODIUM PHOSPHATES POWDER PACK 280-160-250 MG 1 PACKET: 280-160-250 PACK at 13:21

## 2021-12-28 RX ADMIN — BRIVARACETAM 100 MG: 10 SOLUTION ORAL at 08:49

## 2021-12-28 RX ADMIN — HYDROCORTISONE 15 MG: 10 TABLET ORAL at 08:49

## 2021-12-28 RX ADMIN — CARBAMAZEPINE 150 MG: 100 SUSPENSION ORAL at 13:21

## 2021-12-28 RX ADMIN — LEVOTHYROXINE SODIUM 150 MCG: 150 TABLET ORAL at 08:49

## 2021-12-28 RX ADMIN — AMPICILLIN SODIUM AND SULBACTAM SODIUM 3 G: 2; 1 INJECTION, POWDER, FOR SOLUTION INTRAMUSCULAR; INTRAVENOUS at 02:43

## 2021-12-28 RX ADMIN — PANTOPRAZOLE SODIUM 40 MG: 40 TABLET, DELAYED RELEASE ORAL at 13:21

## 2021-12-28 RX ADMIN — AMPICILLIN SODIUM AND SULBACTAM SODIUM 3 G: 2; 1 INJECTION, POWDER, FOR SOLUTION INTRAMUSCULAR; INTRAVENOUS at 08:48

## 2021-12-28 RX ADMIN — METOCLOPRAMIDE HYDROCHLORIDE 10 MG: 5 SOLUTION ORAL at 08:50

## 2021-12-28 RX ADMIN — SODIUM BICARBONATE 650 MG TABLET 650 MG: at 13:21

## 2021-12-28 RX ADMIN — HYDROCORTISONE 5 MG: 5 TABLET ORAL at 13:21

## 2021-12-28 RX ADMIN — CARBAMAZEPINE 150 MG: 100 SUSPENSION ORAL at 06:25

## 2021-12-28 RX ADMIN — ASPIRIN 81 MG CHEWABLE TABLET 81 MG: 81 TABLET CHEWABLE at 14:09

## 2021-12-28 NOTE — DISCHARGE SUMMARY
[facility]  Hospitalist Discharge Summary      Date of Admission:  12/27/2021  Date of Discharge:  No discharge date for patient encounter.  Discharging Provider: Israel Wright MD      Discharge Diagnoses   Suspected recurrent aspiration pneumonitis/pneumonia  Traumatic brain injury with aphasia, dysphagia, and right sided spastic hemiplegia  Seizure disorder  Gastroesophageal reflux disease  Panhypopituitarism  Gastrojejunal tube  Previous cardiac arrest with ventricular fibrillation and ventricular tachycardia    Follow-ups Needed After Discharge   Follow-up Appointments     Follow-up and recommended labs and tests       Follow up with primary care provider, Carlos Gomez, within 7 days for   hospital follow- up.  No follow up labs or test are needed.             Unresulted Labs Ordered in the Past 30 Days of this Admission     Date and Time Order Name Status Description    12/27/2021  4:04 PM Blood Culture Arm, Left Preliminary     12/27/2021  4:04 PM Blood Culture Arm, Left Preliminary       These results will be followed up by Dr. Wright    Discharge Disposition   Discharged to home  Condition at discharge: Stable    Hospital Course   Mr. Farias is a 61-year-old male with a past medical history significant for traumatic brain injury with aphasia and spastic hemiplegia, seizure disorder, gastrojejunal tube with recurrent aspiration, and previous cardiac arrest, who presents to the Emergency Department with altered mentation, fever and tachycardia with low oxygen saturations.     Suspected recurrent aspiration pneumonitis/pneumonia  The patient reportedly had low oxygen saturations, fever, tachycardia and was a little more confused and less interactive than typical. Symptoms improved in the ER.    Registered to observation.    Started on Unasyn, will transition to Augmentin to complete a 7 day course of antibiotics.    Fever, hypoxia, tachycardia all resolved. He appears to be back to  baseline.    Discharge home today.    Will order home health PT.    Discussed plan of care with his mother by phone on the afternoon of discharge, she agrees with the plan.    Follow-up with PCP.    Discussed reasons to seek medical attention prior to follow-up.    Traumatic brain injury with aphasia, dysphagia, and right sided spastic hemiplegia  Seizure disorder    Continue with brivaracetam and Tegretol.    Gastroesophageal reflux disease    Continue with proton pump inhibitor.    Panhypopituitarism    Continue with hydrocortisone and levothyroxine.    Gastrojejunal tube    NPO. Continue tube feedings as previously ordered.    Previous cardiac arrest with ventricular fibrillation and ventricular tachycardia    COVID-19 PCR negative    Routine admission COVID-19 PCR testing was negative on 12/27/21.    Consultations This Hospital Stay   NUTRITION SERVICES ADULT IP CONSULT  PHARMACY IP CONSULT    Code Status   Full Code    Time Spent on this Encounter   I, Israel Wright MD, personally saw the patient today and spent greater than 30 minutes discharging this patient.       Israel Wright MD  [place] [address] [facility]  [address] [place] [address]  Phone: [phone]  ______________________________________________________________________    Physical Exam   Vital Signs: Temp: 97.6  F (36.4  C) Temp src: Axillary BP: 133/71 Pulse: 69   Resp: 24 SpO2: 98 % O2 Device: None (Room air)    Weight: 0 lbs 0 oz  Constitutional: awake, alert, cooperative, no apparent distress, laying in the hospital bed  Respiratory: clear to auscultation bilaterally, no crackles or wheezing  Cardiovascular: regular rate and rhythm, normal S1 and S2, no murmur noted  GI: normal bowel sounds, soft, non-distended, non-tender, GJ tube in place  Skin: warm, dry  Musculoskeletal: no lower extremity pitting edema present  Neurologic: awake, alert, smiled, aphasic       Primary Care Physician   Carlos  Patricia    Discharge Orders      Home Care PT Referral for Hospital Discharge      Reason for your hospital stay    Aspiration pneumonia     Follow-up and recommended labs and tests     Follow up with primary care provider, Carlos Gomez, within 7 days for hospital follow- up.  No follow up labs or test are needed.     Activity    Your activity upon discharge: activity as tolerated with assistance     MD face to face encounter    Documentation of Face to Face and Certification for Home Health Services    I certify that patient: Keyon Farias is under my care and that I, or a nurse practitioner or physician's assistant working with me, had a face-to-face encounter that meets the physician face-to-face encounter requirements with this patient on: 12/28/2021.    This encounter with the patient was in whole, or in part, for the following medical condition, which is the primary reason for home health care: traumatic brain injury.    I certify that, based on my findings, the following services are medically necessary home health services: Physical Therapy.    My clinical findings support the need for the above services because: Physical Therapy Services are needed to assess and treat the following functional impairments: impaired strength and mobility due to traumatic brain injury.    Further, I certify that my clinical findings support that this patient is homebound (i.e. absences from home require considerable and taxing effort and are for medical reasons or [facility] services or infrequently or of short duration when for other reasons) because: Requires assistance of another person or specialized equipment to access medical services because patient: Requires supervision of another for safe transfer...    Based on the above findings. I certify that this patient is confined to the home and needs intermittent skilled nursing care, physical therapy and/or speech therapy.  The patient is under my care, and I have initiated  the establishment of the plan of care.  This patient will be followed by a physician who will periodically review the plan of care.  Physician/Provider to provide follow up care: Carlos Gomez    Attending hospital physician (the Medicare certified [place] provider): Israel Wright MD  Physician Signature: See electronic signature associated with these discharge orders.  Date: 12/28/2021     Diet    Follow this diet upon discharge: resume tube feedings as previously ordered     Significant Results and Procedures   Most Recent 3 CBC's:Recent Labs   Lab Test 12/27/21  1630 12/10/21  1219 12/08/21  1952   WBC 9.6 7.5 5.5   HGB 14.0 12.1* 10.6*   MCV 93 91 94    168 100*     Most Recent 3 BMP's:Recent Labs   Lab Test 12/27/21  1630 12/16/21  1444 12/13/21  0843 12/13/21  0821 12/12/21  1148 12/12/21  0556    138 130*  130*  --    < > 129*   POTASSIUM 3.9  --  4.2  --   --  4.4   CHLORIDE 109  --  98  --   --  99   CO2 30  --  24  --   --  25   BUN 16  --  16  --   --  14   CR 0.90  --  0.80  --   --  0.67   ANIONGAP 2*  --  8  --   --  5   STEVE 8.7  --  8.4*  --   --  8.8   *  --  131* 141*  --  118*    < > = values in this interval not displayed.   ,   Results for orders placed or performed during the hospital encounter of 12/27/21   XR Chest Port 1 View    Narrative    EXAM: XR CHEST PORT 1 VIEW  LOCATION: [facility]  DATE/TIME: 12/27/2021 5:00 PM    INDICATION: fever  COMPARISON: 11/24/2021      Impression    IMPRESSION: Minimal change. Right hemidiaphragm remains elevated. There is mild interstitial prominence at lung bases but no new focal pneumonia. Heart size normal.     Discharge Medications   Current Discharge Medication List      START taking these medications    Details   amoxicillin-clavulanate (AUGMENTIN) 400-57 MG/5ML suspension 10.9 mLs (875 mg) by Per Feeding Tube route 2 times daily for 6 days  Qty: 130.8 mL, Refills: 0    Associated Diagnoses: Aspiration  pneumonia of right lower lobe due to gastric secretions (H)         CONTINUE these medications which have NOT CHANGED    Details   acetaminophen (TYLENOL 8 HOUR) 650 MG CR tablet Take 650 mg by mouth every 8 hours as needed for mild pain or fever      acetylcysteine (MUCOMYST) 20 % neb solution Take 2 mLs by nebulization 4 times daily With albuterol at 0700, 1100, 1500, and 1900       albuterol (PROVENTIL) (5 MG/ML) 0.5% neb solution Take 2.5 mg by nebulization every 4 hours (while awake) 0700 1100 1500 1900 with mucomyst       aspirin (ASA) 81 MG chewable tablet 81 mg by Oral or Feeding Tube route daily At 0900      bacitracin ointment Apply topically daily as needed for wound care To PEG site.       Brivaracetam (BRIVIACT) 10 MG/ML solution 100 mg by Oral or Feeding Tube route 2 times daily 0900, 2100      calcium carbonate 1250 MG/5ML SUSP suspension Take 1,250 mg by mouth 2 times daily 0900, 2100      !! carBAMazepine (TEGRETOL) 100 MG/5ML suspension Take 100 mg by mouth daily Take at 1800       !! carBAMazepine (TEGRETOL) 100 MG/5ML suspension 150 mg by Oral or Feeding Tube route 3 times daily At 06:00, 12:00, and 24:00 for seizures      hydrocortisone (CORTAID) 1 % external cream Apply topically 2 times daily as needed Apply to reddened memo areas as needed      hydrocortisone (CORTEF) 5 MG tablet Take 15 mg (3 tablets) in the morning and 5 mg (1 tablet)  at 2:00 PM. During illness patient takes more as a stress dose. Please increase the dose as directed.  Qty: 400 tablet, Refills: 3    Associated Diagnoses: Secondary adrenal insufficiency (H)      levothyroxine (SYNTHROID/LEVOTHROID) 150 MCG tablet Take 1 tablet (150 mcg) by mouth daily  Qty: 90 tablet, Refills: 3    Associated Diagnoses: Secondary hypothyroidism      metoclopramide (REGLAN) 10 MG/10ML SOLN solution Take 10 mg by mouth 4 times daily (before meals and nightly) 0800, 1200, 1600, 2000  Disconnects bag before administration, then waits 45 mins  before reconnecting after giving the medication      multivitamin, therapeutic (THERA-VIT) TABS tablet Take 1 tablet by mouth daily      pantoprazole (PROTONIX) 2 mg/mL SUSP suspension 20 mLs (40 mg) by Per J Tube route daily  Qty: 400 mL, Refills: 0    Comments: Future refills by PCP Dr. Carlos Gomez with phone number [phone].  Associated Diagnoses: Gastroesophageal reflux disease, esophagitis presence not specified      potassium & sodium phosphates (NEUTRA-PHOS) 280-160-250 MG Packet Take 1 packet by mouth 3 times daily       Scopolamine HBr POWD Dispense #90. Mix contents with small amount of water for admin via J-tube.  Administer 0.8 mg three times each day.  Qty: 450 g, Refills: 1    Associated Diagnoses: Aspiration pneumonia of both lungs due to gastric secretions, unspecified part of lung (H)      sodium bicarbonate 650 MG tablet Take 1 tablet (650 mg) by mouth daily  Qty: 30 tablet, Refills: 0    Associated Diagnoses: Hyponatremia      testosterone cypionate (DEPOTESTOSTERONE) 200 MG/ML injection Inject 0.3 mLs (60 mg) into the muscle every 7 days  Qty: 6 mL, Refills: 1    Associated Diagnoses: Panhypopituitarism (H); Hypogonadism male      vitamin C (ASCORBIC ACID) 1000 MG TABS 1,000 mg by Oral or Feeding Tube route daily       vitamin D3 (CHOLECALCIFEROL) 2000 units (50 mcg) tablet Take 2,000 Units by mouth daily Crush and feed via j-tube @@ 0900      mupirocin (BACTROBAN) 2 % external ointment Apply topically 2 times daily as needed       Skin Protectants, Misc. (BALMEX SKIN PROTECTANT) OINT Externally apply topically 2 times daily as needed (irritation) Applay to reddened memo areas twice daily as needed       !! - Potential duplicate medications found. Please discuss with provider.        Allergies   Allergies   Allergen Reactions     Valproic Acid Other (See Comments)     Toxicity w/ bone marrow suspension, elevated ammonia levels      Dilantin [Phenytoin Sodium] Other (See Comments)     Severe  Trembling     Scopolamine Hives     Hives with the patch - oral no problem

## 2021-12-28 NOTE — PHARMACY-ADMISSION MEDICATION HISTORY
Pharmacy Medication History  Admission medication history interview status for the 12/27/2021  admission is complete. See EPIC admission navigator for prior to admission medications     Location of Interview: Phone  Medication history sources: Patient's family/friend (caregiver)    Significant changes made to the medication list:  N/A    In the past week, patient estimated taking medication this percent of the time: greater than 90%    Additional medication history information:   -Patient is due for weekly testosterone today 12/27    Medication reconciliation completed by provider prior to medication history? Yes    Time spent in this activity: 25 minutes     Prior to Admission medications    Medication Sig Last Dose Taking? Auth Provider   acetaminophen (TYLENOL 8 HOUR) 650 MG CR tablet Take 650 mg by mouth every 8 hours as needed for mild pain or fever  Yes Unknown, Entered By History   acetylcysteine (MUCOMYST) 20 % neb solution Take 2 mLs by nebulization 4 times daily With albuterol at 0700, 1100, 1500, and 1900  12/27/2021 at 1100 Yes Unknown, Entered By History   albuterol (PROVENTIL) (5 MG/ML) 0.5% neb solution Take 2.5 mg by nebulization every 4 hours (while awake) 0700 1100 1500 1900 with mucomyst  12/27/2021 at 1100 Yes Unknown, Entered By History   aspirin (ASA) 81 MG chewable tablet 81 mg by Oral or Feeding Tube route daily At 0900 12/27/2021 at Unknown time Yes Unknown, Entered By History   bacitracin ointment Apply topically daily as needed for wound care To PEG site.   Yes Unknown, Entered By History   Brivaracetam (BRIVIACT) 10 MG/ML solution 100 mg by Oral or Feeding Tube route 2 times daily 0900, 2100 12/27/2021 at 0900 Yes Unknown, Entered By History   calcium carbonate 1250 MG/5ML SUSP suspension Take 1,250 mg by mouth 2 times daily 0900, 2100 12/27/2021 at 0900 Yes Unknown, Entered By History   carBAMazepine (TEGRETOL) 100 MG/5ML suspension Take 100 mg by mouth daily Take at 1800  12/26/2021 at  Unknown time Yes Unknown, Entered By History   carBAMazepine (TEGRETOL) 100 MG/5ML suspension 150 mg by Oral or Feeding Tube route 3 times daily At 06:00, 12:00, and 24:00 for seizures 12/27/2021 at 1200 Yes Unknown, Entered By History   hydrocortisone (CORTAID) 1 % external cream Apply topically 2 times daily as needed Apply to reddened memo areas as needed  Yes Unknown, Entered By History   hydrocortisone (CORTEF) 5 MG tablet Take 15 mg (3 tablets) in the morning and 5 mg (1 tablet)  at 2:00 PM. During illness patient takes more as a stress dose. Please increase the dose as directed. 12/27/2021 at Unknown time Yes Faizan Mclean MD   levothyroxine (SYNTHROID/LEVOTHROID) 150 MCG tablet Take 1 tablet (150 mcg) by mouth daily 12/27/2021 at Unknown time Yes Faizan Mclean MD   metoclopramide (REGLAN) 10 MG/10ML SOLN solution Take 10 mg by mouth 4 times daily (before meals and nightly) 0800, 1200, 1600, 2000  Disconnects bag before administration, then waits 45 mins before reconnecting after giving the medication 12/27/2021 at 1200 Yes Unknown, Entered By History   multivitamin, therapeutic (THERA-VIT) TABS tablet Take 1 tablet by mouth daily 12/27/2021 at Unknown time Yes Reported, Patient   pantoprazole (PROTONIX) 2 mg/mL SUSP suspension 20 mLs (40 mg) by Per J Tube route daily  Yes Alvaro Barahona MD   potassium & sodium phosphates (NEUTRA-PHOS) 280-160-250 MG Packet Take 1 packet by mouth 3 times daily  12/27/2021 at 0900 Yes Yanely Liriano MD   Scopolamine HBr POWD Dispense #90. Mix contents with small amount of water for admin via J-tube.  Administer 0.8 mg three times each day.  Patient taking differently: Dispense #90. Mix contents with small amount of water for admin via J-tube.  Administer 0.8 mg three times each day as needed.  Yes Jennie Bermudez MD   sodium bicarbonate 650 MG tablet Take 1 tablet (650 mg) by mouth daily 12/27/2021 at Unknown time Yes Ricky Torres MD   testosterone  cypionate (DEPOTESTOSTERONE) 200 MG/ML injection Inject 0.3 mLs (60 mg) into the muscle every 7 days 12/20/2021 at Unknown time Yes Faizan Mclean MD   vitamin C (ASCORBIC ACID) 1000 MG TABS 1,000 mg by Oral or Feeding Tube route daily  12/27/2021 at Unknown time Yes Unknown, Entered By History   vitamin D3 (CHOLECALCIFEROL) 2000 units (50 mcg) tablet Take 2,000 Units by mouth daily Crush and feed via j-tube @@ 0900 12/27/2021 at Unknown time Yes Unknown, Entered By History   mupirocin (BACTROBAN) 2 % external ointment Apply topically 2 times daily as needed    Reported, Patient   Skin Protectants, Misc. (BALMEX SKIN PROTECTANT) OINT Externally apply topically 2 times daily as needed (irritation) Applay to reddened memo areas twice daily as needed   Unknown, Entered By History       The information provided in this note is only as accurate as the sources available at the time of update(s)

## 2021-12-28 NOTE — PROGRESS NOTES
Called to speak with RN at 0745 regarding MD note stating possible Discharge to home if stable overnight. RN not able to take phone call. No call back received, pt arrived to [facility] at 0810.     Received a phone call from Savannah, mother/guardian, she is aware pt may be discharged to home today.    Paged, Dr. Wright to call Savannah, mom, once he rounds.

## 2021-12-28 NOTE — CONSULTS
Care Management    Pt was admitted Observation this AM and is discharging this afternoon.  Have conversed with pt's Mother Savannah, who is also his caregiver and Guardian.  Explained Observation status and will send pt's copy of the MOON document home with him.     Per Savannah, pt has the following services in place:  [facility] (phone [phone], fax [phone])  Approved for 12 hours RN coverage daily, but unable to get the help, so pt only has a 12 hr nurse on Wednesday and Thursday in the past  Currently it is off and on.  His  has aggressively tried to get [place] more assistance to no avail.  Savannah will not consider NH placement.  Savannah is now his main caregiver.    Pt has had home PT in the past.  Savannah is asking for this again.  She would would like [facility].  Referral has been sent.  Pt also has a PCA 12 hours evening / night through [employer].  His PCA also lives with them, so he is able to help pt additional hours.   [facility] supplies       [facility] set for 5:00 PM today via [facility].    Karen Collins RN  Inpatient Care Management  [phone]

## 2021-12-28 NOTE — H&P
[facility]    History and Physical - Hospitalist Service       Date of Admission:  12/27/2021  Dictation #: [id number]  Brief Summary (see dictation for more details): 61M hx TBI with aphasia and hemiplegia, recurrent aspiration with GJ tube noted by mother to be tachy, febrile and low O2 sats.  Improved here but some concern for aspiration pneumonia.  Started on unasyn.     Clinically Significant Risk Factors Present on Admission             # Coagulation Defect: INR = 1.28 (Ref range: 0.85 - 1.15) and/or PTT = N/A on admission, will monitor for bleeding  # Platelet Defect: home medication list includes an antiplatelet medication         Washington Fuchs DO  [facility]  Securely message with the Vocera Web Console (learn more here)  Text page via [employer] Paging/Directory

## 2021-12-28 NOTE — CONSULTS
CLINICAL NUTRITION SERVICES  -  ASSESSMENT NOTE      Recommendations Ordered by Registered Dietitian (RD):   Restart TF via J-port of GJ FT - Jevity 1.5 at 30 mL/hr; after 6 hrs increase to goal 55 mL/hr = 1980 kcals (26 kcal/kg), 84 gm pro (1.1 gm/kg and 93% pro needs), 1003 mL H20, 284 gm CHO, 27 gm fiber  Free H20 200 mL every 4 hrs (1200 mL)  Total Fluid (TF + flushes):  2203 mL (29 mL/kg)   Malnutrition:   % Weight Loss:  Unable to determine without recent wt   % Intake:  No decreased intake noted - No recent holding of TF noted in provider's notes  Subcutaneous Fat Loss:  None observed  Muscle Loss:  None observed. Suspect chronic muscle mass loss in BLE  Fluid Retention:  Mild - doubt nutrition related    Malnutrition Diagnosis: Patient does not meet two of the above criteria necessary for diagnosing malnutrition        REASON FOR ASSESSMENT  Keyon Farias is a 61 year old male seen by Registered Dietitian for Provider Order - Registered Dietitian to Assess and Order TF per Medical Nutrition Therapy Protocol    DX:  Aspiration pneumonitis     PMH:   1.  TBI with aphasia and spastic right hemiplegia.   2.  Seizure disorder.   3.  DVT.   4.  GERD.   5.  Ventricular fibrillation and ventricular tachycardia.   6.  Cardiac arrest.   7.  Panhypopituitarism.   8.  Necrotizing pancreatitis.   9.  GERD.   10.  GJ tube.   11.  Recurrent aspiration pneumonia.     NUTRITION HISTORY  - Information obtained from chart.  Patient is well known to our service.    He lives at home with his mother Savannah who is his caregiver.  He relies solely on enteral nutrition to meet his nutrient and fluid needs.  His TF regimen in the past has been Jevity 1.5 at 55 mL/hr x 24 hrs  Free H20 4935-3127 mL  Benefiber 1 scoop per day (last recent admit, this was not listed on Pharmacy medication)    CURRENT NUTRITION ORDERS  Diet Order:     None    Current Intake/Tolerance:  N/A      NUTRITION FOCUSED PHYSICAL ASSESSMENT FOR DIAGNOSING  MALNUTRITION)  Yes  (Upper body only as did not want to waken pt to remove covers)               Observed:    None noted    Obtained from Chart/Interdisciplinary Team:  Edema - trace in extremities    ANTHROPOMETRICS  Height:  5 ft 11.5 in  Weight:  Not available  Last Recorded Wt:  75.2 kg (12/11)  BMI:  Unable to determine current; 22.8 using last recorded wt  Weight Status:  Normal BMI  IBW: 79.5 kg  % IBW: 95%  Weight History:  UBW range over past year 73.6-80 kg.  Last recorded wt towards lower end of range.    Wt Readings from Last 10 Encounters:   12/11/21 75.2 kg (165 lb 12.6 oz)   11/17/21 73.6 kg (162 lb 3.2 oz)   10/30/21 75.2 kg (165 lb 12.6 oz)   08/10/21 78.8 kg (173 lb 11.6 oz)   05/24/21 80.1 kg (176 lb 8 oz)   04/16/21 76 kg (167 lb 8.8 oz)   02/22/21 74.8 kg (165 lb)   01/18/21 74.2 kg (163 lb 9.3 oz)   12/20/20 77.2 kg (170 lb 1.6 oz)   11/04/20 71 kg (156 lb 8.4 oz)       LABS  Labs reviewed  Na 141 (NL)  K 3.9 (NL)    MEDICATIONS  Medications reviewed  Cortef  Synthroid  Reglan    ASSESSED NUTRITION NEEDS PER APPROVED PRACTICE GUIDELINES:    Dosing Weight:  75.2 kg (12/11 wt)   Estimated Energy Needs: 3206-2814 kcals (25-30 Kcal/Kg)  Justification: maintenance  Estimated Protein Needs:  grams protein (1.2-1.5 g pro/Kg)  Justification: hypercatabolism with critical illness and preservation of lean body mass  Estimated Fluid Needs: 0217-5316  mL (1 mL/Kcal)  Justification: maintenance    MALNUTRITION:  % Weight Loss:  Unable to determine without recent wt   % Intake:  No decreased intake noted - No recent holding of TF noted in provider's notes  Subcutaneous Fat Loss:  None observed  Muscle Loss:  None observed. Suspect chronic muscle mass loss in BLE  Fluid Retention:  Mild - doubt nutrition related    Malnutrition Diagnosis: Patient does not meet two of the above criteria necessary for diagnosing malnutrition    NUTRITION DIAGNOSIS:  Inadequate enteral nutrition infusion related to TF on  hold as evidenced by meeting 0% needs but plan to resume TF today    NUTRITION INTERVENTIONS  Recommendations / Nutrition Prescription  Restart TF via J-port of GJ FT - Jevity 1.5 at 30 mL/hr; after 6 hrs increase to goal 55 mL/hr = 1980 kcals (26 kcal/kg), 84 gm pro (1.1 gm/kg and 93% pro needs), 1003 mL H20, 284 gm CHO, 27 gm fiber  Free H20 200 mL every 4 hrs (1200 mL)  Total Fluid (TF + flushes):  2203 mL (29 mL/kg)    Implementation  Nutrition education: Not appropriate at this time due to patient condition  EN Composition, EN Schedule and Feeding Tube Flush - Orders entered in Epic as above    Nutrition Goals  TF will meet % estimated needs    MONITORING AND EVALUATION:  Progress towards goals will be monitored and evaluated per protocol and Practice Guidelines    Lisa Garcia, RD, LD, CNSC

## 2021-12-28 NOTE — PROGRESS NOTES
Observation goal:  Respiratory status stable, mother willing to take home: Met. MD updated to see pt and update mother via phone.

## 2021-12-28 NOTE — PLAN OF CARE
Summary: Aspiration Pneumonia/Hypoxic at home  DATE & TIME: 12/28/21   Cognitive Concerns/ Orientation : Noverbal at baseline, answers with a thumbs up or head nod yes/no.   BEHAVIOR & AGGRESSION TOOL COLOR: Yellow, can be combative with oral cares.   CIWA SCORE: NA  ABNL VS/O2: VSS on RA  MOBILITY: Total care, turn/repos in bed, not OOB.   PAIN MANAGMENT: Denies. No moaning/groaning with repos.   DIET: NPO. Refusing oral cares.  BOWEL/BLADDER: Inc b/b. Voiding adeq, no BM.   ABNL LAB/BG: NA  DRAIN/DEVICES: PIV removed, PEG tube.   TELEMETRY RHYTHM: NA  SKIN: Scratch marks present, blanchable redness to coccyx/sacrum, red memo area.   TESTS/PROCEDURES: NA  D/C DAY/GOALS/PLACE: Home today.   OTHER IMPORTANT INFO: Received IV Unasyn x1 and next dose augmentin via G tube tonight at home. Left UE +1 edema, PIV removed. Resting/content between cares.

## 2021-12-28 NOTE — PROGRESS NOTES
Pt arrived from ED to room 621-2 wearing his black socks. No belongings bag seen in room. When ready to discharge Mother says pt was wearing a shirt and his pants when he was taken by ambulance to [facility] to [facility]. Spoke with lost and found and ED. No belongings found. Pt was wearing clothing when arrived via ambulance per ED RN. Lost and Found and Patient Relations phone numbers given to MotherSavannah on AVS. Apologized for the lost clothing. Pt left via stretcher with black socks, hospital gown and several blankets.     Discharge    Patient discharged to home via HE stretcher.     Listed belongings gathered and given to patient (including from security/pharmacy). No: See note above.   Care Plan and Patient education resolved: Yes  Prescriptions if needed, hard copies sent with patient  NA  Medication Bin checked and emptied on discharge Yes  SW/care coordinator/charge RN aware of discharge: Yes

## 2021-12-28 NOTE — PROGRESS NOTES
Observation goal:  Respiratory status stable, mother willing to take home: Met.     Plan to Discharge to home, HE stretcher  transport at 5pm.

## 2021-12-28 NOTE — ED NOTES
[facility]  ED Nurse Handoff Report    ED Chief complaint: Fever (temperature of 101,  reported by mother who called 911.  mental status at baseline  Tylenol x1 at 1430, recent pneumonia finished course of antibiotics)      ED Diagnosis:   Final diagnoses:   Aspiration pneumonitis (H)   Traumatic brain injury, without loss of consciousness, initial encounter (H)   Seizure disorder (H)       Code Status: to be addressed by admitting MD    Allergies:   Allergies   Allergen Reactions     Valproic Acid Other (See Comments)     Toxicity w/ bone marrow suspension, elevated ammonia levels      Dilantin [Phenytoin Sodium] Other (See Comments)     Severe Trembling     Scopolamine Hives     Hives with the patch - oral no problem       Patient Story: Keyon is a 61 year old TBI with seizure disorder, contracted right side, paraplegic cared for at home by his mother.  Several admissions for aspiration pneumonia and sepsis mostly recently 4 weeks ago.  Today at home, his mother noted him to be less responsive so she checked his temperature and found it bryce be 101, heart rate is in the 120s.     Focused Assessment:  Patient is at mental baseline, able to nod appropriately, LUE has some movement.  Occassional wet loose congested cough.  Lungs clear, diminished on the right.      Treatments and/or interventions provided: labs wnl.  cxr without pneumonia, urine clean.  1L of fluid given, antibiotic to be given propholacticaly.  Admit to Obs to see if pneumonia declares itself. Tylenol given.     Patient's response to treatments and/or interventions: afebrile, HR 90s, more responsive.     To be done/followed up on inpatient unit:  monitor    Does this patient have any cognitive concerns?: Hx Head Trauma    Activity level - Baseline/Home:  Total Care  Activity Level - Current:   Total Care    Patient's Preferred language: English   Needed?: No    Isolation: None and Contact   Infection: Not  Applicable  MRSA  VRE  Patient tested for COVID 19 prior to admission: YES  Bariatric?: No    Vital Signs:   Vitals:    12/27/21 1800 12/27/21 1820 12/27/21 1850 12/27/21 1900   BP:   110/74    Pulse:       Resp:       Temp:   98.9  F (37.2  C)    TempSrc:   Oral    SpO2: 94% 97% 97% 96%       Cardiac Rhythm:     Was the PSS-3 completed:   Yes  What interventions are required if any?               Family Comments: mother Savannah at home and aware of Observation status  OBS brochure/video discussed/provided to patient/family: N/A              Name of person given brochure if not patient:               Relationship to patient:     For the majority of the shift this patient's behavior was Green.   Behavioral interventions performed were .    ED NURSE PHONE NUMBER:  [phone]

## 2021-12-28 NOTE — H&P
Admitted: 12/27/2021    PRIMARY CARE PHYSICIAN:  Dr. Carlos Gomez    CODE STATUS:  Full code.    CHIEF COMPLAINT:  Fever and tachycardia.    HISTORY OF PRESENT ILLNESS:  Mr. 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  History is obtained through discussion with the ED physician and chart review, as the patient is unable to provide any history to me.  I do note that in chart review the patient was admitted to [facility] from 12/05/2021-12/13/2021 for aspiration pneumonia and respiratory failure, where he initially was on vancomycin and Zosyn and then transitioned to Augmentin upon discharge.  The mother today noted the patient had low oxygen saturations with a fever to 101 degrees Fahrenheit and was tachycardic into the 120s.  He was also less interactive than is typical for his baseline.  He came to the Emergency Department, where he was febrile and tachycardic, but this improved with IV fluids.  He is now resting comfortably and does not tell me that he has any concerns with yes-and-no questioning.  A chest x-ray did show some bibasilar opacities, but it seems fairly chronic and no definitive new pneumonia.  His vitals are now stable, and his lab work is unremarkable.  He was given a dose of Unasyn for probable aspiration pneumonia, for which this has become somewhat recurrent issue.  Initial plan was for potentially to discharge to home, but mother declined to take him back, thinking he needed to spend at least one night in the hospital.  He is thus being admitted for observation.  The patient denies any chest pain or any other discomfort at this point.    PAST MEDICAL HISTORY:    1.  TBI with aphasia and spastic right hemiplegia.  2.   Seizure disorder.  3.  DVT.  4.  GERD.  5.  Ventricular fibrillation and ventricular tachycardia.  6.  Cardiac arrest.  7.  Panhypopituitarism.  8.  Necrotizing pancreatitis.  9.  GERD.  10.  GJ tube.  11.  Recurrent aspiration pneumonia.    MEDICATIONS:    Prior to Admission medications    Medication Sig Last Dose Taking? Auth Provider   acetaminophen (TYLENOL 8 HOUR) 650 MG CR tablet Take 650 mg by mouth every 8 hours as needed for mild pain or fever   Yes Unknown, Entered By History   acetylcysteine (MUCOMYST) 20 % neb solution Take 2 mLs by nebulization 4 times daily With albuterol at 0700, 1100, 1500, and 1900  12/27/2021 at 1100 Yes Unknown, Entered By History   albuterol (PROVENTIL) (5 MG/ML) 0.5% neb solution Take 2.5 mg by nebulization every 4 hours (while awake) 0700 1100 1500 1900 with mucomyst  12/27/2021 at 1100 Yes Unknown, Entered By History   aspirin (ASA) 81 MG chewable tablet 81 mg by Oral or Feeding Tube route daily At 0900 12/27/2021 at Unknown time Yes Unknown, Entered By History   bacitracin ointment Apply topically daily as needed for wound care To PEG site.    Yes Unknown, Entered By History   Brivaracetam (BRIVIACT) 10 MG/ML solution 100 mg by Oral or Feeding Tube route 2 times daily 0900, 2100 12/27/2021 at 0900 Yes Unknown, Entered By History   calcium carbonate 1250 MG/5ML SUSP suspension Take 1,250 mg by mouth 2 times daily 0900, 2100 12/27/2021 at 0900 Yes Unknown, Entered By History   carBAMazepine (TEGRETOL) 100 MG/5ML suspension Take 100 mg by mouth daily Take at 1800  12/26/2021 at Unknown time Yes Unknown, Entered By History   carBAMazepine (TEGRETOL) 100 MG/5ML suspension 150 mg by Oral or Feeding Tube route 3 times daily At 06:00, 12:00, and 24:00 for seizures 12/27/2021 at 1200 Yes Unknown, Entered By History   hydrocortisone (CORTAID) 1 % external cream Apply topically 2 times daily as needed Apply to reddened memo areas as needed   Yes Unknown, Entered By History    hydrocortisone (CORTEF) 5 MG tablet Take 15 mg (3 tablets) in the morning and 5 mg (1 tablet)  at 2:00 PM. During illness patient takes more as a stress dose. Please increase the dose as directed. 12/27/2021 at Unknown time Yes Faizan Mclean MD   levothyroxine (SYNTHROID/LEVOTHROID) 150 MCG tablet Take 1 tablet (150 mcg) by mouth daily 12/27/2021 at Unknown time Yes Faizan Mclean MD   metoclopramide (REGLAN) 10 MG/10ML SOLN solution Take 10 mg by mouth 4 times daily (before meals and nightly) 0800, 1200, 1600, 2000  Disconnects bag before administration, then waits 45 mins before reconnecting after giving the medication 12/27/2021 at 1200 Yes Unknown, Entered By History   multivitamin, therapeutic (THERA-VIT) TABS tablet Take 1 tablet by mouth daily 12/27/2021 at Unknown time Yes Reported, Patient   pantoprazole (PROTONIX) 2 mg/mL SUSP suspension 20 mLs (40 mg) by Per J Tube route daily   Yes Alvaro Barahona MD   potassium & sodium phosphates (NEUTRA-PHOS) 280-160-250 MG Packet Take 1 packet by mouth 3 times daily  12/27/2021 at 0900 Yes Yanely Liriano MD   Scopolamine HBr POWD Dispense #90. Mix contents with small amount of water for admin via J-tube.  Administer 0.8 mg three times each day.  Patient taking differently: Dispense #90. Mix contents with small amount of water for admin via J-tube.  Administer 0.8 mg three times each day as needed.   Yes Jennie Bermudez MD   sodium bicarbonate 650 MG tablet Take 1 tablet (650 mg) by mouth daily 12/27/2021 at Unknown time Yes Ricky Torres MD   testosterone cypionate (DEPOTESTOSTERONE) 200 MG/ML injection Inject 0.3 mLs (60 mg) into the muscle every 7 days 12/20/2021 at Unknown time Yes Faizan Mclean MD   vitamin C (ASCORBIC ACID) 1000 MG TABS 1,000 mg by Oral or Feeding Tube route daily  12/27/2021 at Unknown time Yes Unknown, Entered By History   vitamin D3 (CHOLECALCIFEROL) 2000 units (50 mcg) tablet Take 2,000 Units by mouth daily  Crush and feed via j-tube @@ 0900 12/27/2021 at Unknown time Yes Unknown, Entered By History   mupirocin (BACTROBAN) 2 % external ointment Apply topically 2 times daily as needed      Reported, Patient   Skin Protectants, Misc. (BALMEX SKIN PROTECTANT) OINT Externally apply topically 2 times daily as needed (irritation) Applay to reddened memo areas twice daily as needed     Unknown, Entered By History         SOCIAL HISTORY:  The patient lives with his mother.  No reported use of tobacco or alcohol.    FAMILY HISTORY:  Father had cancer.    ALLERGIES:  DILANTIN, SCOPOLAMINE.    REVIEW OF SYSTEMS:  A complete review of systems reviewed and negative save for the pertinent positives which are recorded in the HPI.    PHYSICAL EXAMINATION:    VITAL SIGNS:  Show blood pressure of 117/72, heart rate 81, respirations 24, saturating 96% on room air, temperature 98.9 degrees Fahrenheit.  GENERAL:  The patient is lying in bed and appears comfortable.  HEENT:  Pupils equal, round, reactive to light.  Extraocular muscle function intact.  No scleral icterus.  Oropharynx is clear.  He does have dry mucous membranes.  NECK:  No lymphadenopathy or thyromegaly.  CARDIOVASCULAR:  Heart is regular rate and rhythm without any appreciable murmur, rub or gallop.  PULMONARY:  Lungs are clear to auscultation bilaterally without wheeze or rhonchi.  GASTROINTESTINAL:  Positive bowel sounds, soft, nontender and nondistended.  SKIN:  No new rashes or lesions.  LYMPHATICS:  Trace edema of the bilateral lower extremities.  PSYCHIATRIC:  The patient is aphasic.  NEUROLOGIC:  The patient has right-sided hemiplegia but moves on the left side.    LABORATORY DATA:  BMP, LFTs, CBC are all unremarkable.  UA is bland.  Chest x-ray shows interstitial disease of the lung bases without any definitive new pneumonia.  COVID screen is negative.    IMPRESSION AND PLAN:  Mr. Farias is a 61-year-old male with a past medical history significant for traumatic brain  injury with aphasia and spastic hemiplegia, seizure disorder, gastrojejunal tube with recurrent aspiration, and previous cardiac arrest, who presents to the Emergency Department with altered mentation, fever and tachycardia with low oxygen saturations.    1.  Possible recurrent aspiration pneumonitis:  The patient reportedly had low oxygen saturations, fever, tachycardia and has been a little more confused and less interactive than typical, though all of this currently seems resolved in the Emergency Department.  He has been initiated on Unasyn, which I will continue for now, but based upon current presentation, I would complete a limited course of antibiotics only.  It may be reasonable, given that he has had recurrent episodes of this, to consider referral to Palliative Care as an outpatient to determine some goals of care.  Will continue with his neb and inhaler regimen as prior to admission.  2.  Seizure disorder:  Continue with brivaracetam and Tegretol.  3.  Gastroesophageal reflux disease:  Continue with proton pump inhibitor.  4.  Panhypopituitarism:  Continue with hydrocortisone and levothyroxine.  5.  Previous cardiac arrest with ventricular fibrillation and ventricular tachycardia.  6.  Gastrojejunal tube:  The patient is n.p.o.  Nutrition consult for tube feeds if he remains here longer than tomorrow.  7.  Disposition:  Anticipate overnight stay to ensure he remains stable, and hopefully can discharge back to home in the morning.    Washington Fuchs DO        D: 2021   T: 2021   MT: MIMI    Name:     BERT BARAJAS  MRN:      [id number]        Account:     [id number]   :      1960           Admitted:    2021       Document: [id number]

## 2021-12-29 ENCOUNTER — PATIENT OUTREACH (OUTPATIENT)
Dept: CARE COORDINATION | Facility: CLINIC | Age: 61
End: 2021-12-29
Payer: MEDICARE

## 2021-12-29 DIAGNOSIS — Z71.89 OTHER SPECIFIED COUNSELING: ICD-10-CM

## 2022-01-01 LAB
BACTERIA BLD CULT: NO GROWTH
BACTERIA BLD CULT: NO GROWTH

## 2022-01-05 ENCOUNTER — VIRTUAL VISIT (OUTPATIENT)
Dept: ENDOCRINOLOGY | Facility: CLINIC | Age: 62
End: 2022-01-05
Payer: MEDICARE

## 2022-01-05 DIAGNOSIS — E29.1 HYPOGONADISM MALE: ICD-10-CM

## 2022-01-05 DIAGNOSIS — E27.49 SECONDARY ADRENAL INSUFFICIENCY (H): ICD-10-CM

## 2022-01-05 DIAGNOSIS — Z09 HOSPITAL DISCHARGE FOLLOW-UP: Primary | ICD-10-CM

## 2022-01-05 DIAGNOSIS — E23.0 PANHYPOPITUITARISM (H): ICD-10-CM

## 2022-01-05 DIAGNOSIS — E03.8 SECONDARY HYPOTHYROIDISM: ICD-10-CM

## 2022-01-05 PROCEDURE — 99215 OFFICE O/P EST HI 40 MIN: CPT | Mod: 95 | Performed by: INTERNAL MEDICINE

## 2022-01-05 NOTE — PROGRESS NOTES
Keyon is a 61 year old who is being evaluated via a billable video visit.      How would you like to obtain your AVS? Mail a copy  If the video visit is dropped, the invitation should be resent by: Text to cell phone: [phone]  Will anyone else be joining your video visit?  Mother will be on the same device.     Stacey Mehta, VF

## 2022-01-05 NOTE — LETTER
1/5/2022       RE: Keyon Farias  [address]  [place] [address]     Dear Colleague,    Thank you for referring your patient, Keyon Farias, to the [facility] [place] [facility]. Please see a copy of my visit note below.    Keyon is a 61 year old who is being evaluated via a billable video visit.      How would you like to obtain your AVS? Mail a copy  If the video visit is dropped, the invitation should be resent by: Text to cell phone: [phone]  Will anyone else be joining your video visit?  Mother will be on the same device.     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 No fever, oxygen level is back to baseline at this time.     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  Develops intermittently hyponatremia that needs treatment with DDAVP.  For example during his last hospitalization he needed DDAVP for treatment of hypernatremia.  Over the last 12 months has not been on DDAVP. Recently checked Na was stable.     Secondary adrenal insufficiency  Continue Hydrocortisone (HC) 15 mg in the AM, 5mg at 3:00 PM     The lowest effective dose of hydrocortisone is what is recommended to be used.  - sick day rules - increase hydrocortisone to 2-3X of the maintenance dose during sickness like flu. Needs injection if unable to keep medication down due to vomiting.   Injectable form of hydrocortisone sent to the pharmacy to be used as needed.      Central hypothyroidism   -continue Levothyroxine 150 mcg daily.  Last free T4 within the recommended range.     Central hypogonadism    -Last testosterone level is slightly higher than the recommended range therefore I have advised to reduce the dose of testosterone to 60 mg every 7 days from previous dose of 76 mg every 7 days.   - PSA stable.  Hematocrit recently checked and was stable.     I will contact the patient with the test results.  Return to clinic in 6 months.  Patient Instructions   Mykel Abel [place]   It was a pleasure meeting you.    Secondary adrenal insufficiency  -Hydrocortisone (HC) 15 mg in the AM, 5 mg at 3:00 am.   - sick day rules - increase hydrocortisone to 3X of the maintenance dose during sickness like flu.  (for eg. Hydrocortisone to be increased to 15 mg 3 times per day during illness until he gets better). Then after he improves from illness; dose needs to go back to his usual dose of 15 mg in the morning and 5 mg in the afternoon.   -Needs injection form of hydrocortisone if unable to keep medication down due to vomiting.    - use Injectable form of hydrocortisone as needed.     Central hypothyroidism   -continue Levothyroxine 150 mcg daily     Central hypogonadism  -  testosterone dose to 0.3 ml (60 mg) every 7 days        Central diabetes insipidus   -Follow free water replacement therapy as previously advised  - change in mental status from baseline should prompt sodium level checks.   - If urine output increases to more than 3 L- please check sodium level.   - There is a standing order for Sodium checks as needed. Check a follow up sodium.      Please let us know if any questions.    Test and/or medications prescribed today:  Orders Placed This Encounter   Procedures     Testosterone total       Faizan Mclean MD  Staff Endocrinologist    Division of Endocrinology and Diabetes      Subjective:         HPI: Keyon Farias is a 61 year old male with history of panhypopituitarism.    Admitted with possible recurrent aspiration pneumonitis/pneumonia on 12/27/21 and discharged home. No fever, oxygen level is back to  baseline.       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  Develops intermittently hyponatremia that needs treatment with DDAVP.  For example during his last hospitalization he needed DDAVP for treatment of hypernatremia.  Over the last 12 months has not been on DDAVP. Last Na on 12/27/21 was normal. UO and intake is stable.     Secondary adrenal insufficiency  On Hydrocortisone (HC) 15 mg in the AM, 5mg at 3:00 PM     The lowest effective dose of hydrocortisone is what is recommended to be used.  Understands sick day rules - increase hydrocortisone to 2-3X of the maintenance dose during sickness like flu. Needs injection if unable to keep medication down due to vomiting.   Injectable form of hydrocortisone sent to the pharmacy to be used as needed.      Central hypothyroidism   -continue Levothyroxine 150 mcg daily.  Last free T4 within the recommended range.     Central hypogonadism   -currently on testosterone 60 mg every 7 days.   - PSA stable.  hematocrit stable.    J-tube functioning well.      Urine output has been stable between 1-1.5 L/day.  Free water administration has been about 120 cc every 4 hours.         Allergies   Allergen Reactions     Valproic Acid Other (See Comments)     Toxicity w/ bone marrow suspension, elevated ammonia levels      Dilantin [Phenytoin Sodium] Other (See Comments)     Severe Trembling     Scopolamine Hives     Hives with the patch - oral no problem       Current Outpatient Medications   Medication Sig Dispense Refill     acetaminophen (TYLENOL 8 HOUR) 650 MG CR tablet Take 650 mg by mouth every 8 hours as needed for mild pain or fever       acetylcysteine (MUCOMYST) 20 % neb solution Take 2 mLs by nebulization 4 times daily With albuterol at 0700, 1100, 1500, and 1900        albuterol (PROVENTIL) (5 MG/ML) 0.5% neb solution Take 2.5 mg by nebulization every 4 hours (while awake) 0700 1100 1500 1900 with mucomyst         aspirin (ASA) 81 MG chewable tablet 81 mg by Oral or Feeding Tube route daily At 0900       bacitracin ointment Apply topically daily as needed for wound care To PEG site.        Brivaracetam (BRIVIACT) 10 MG/ML solution 100 mg by Oral or Feeding Tube route 2 times daily 0900, 2100       calcium carbonate 1250 MG/5ML SUSP suspension Take 1,250 mg by mouth 2 times daily 0900, 2100       carBAMazepine (TEGRETOL) 100 MG/5ML suspension Take 100 mg by mouth daily Take at 1800        carBAMazepine (TEGRETOL) 100 MG/5ML suspension 150 mg by Oral or Feeding Tube route 3 times daily At 06:00, 12:00, and 24:00 for seizures       hydrocortisone (CORTAID) 1 % external cream Apply topically 2 times daily as needed Apply to reddened memo areas as needed       hydrocortisone (CORTEF) 5 MG tablet Take 15 mg (3 tablets) in the morning and 5 mg (1 tablet)  at 2:00 PM. During illness patient takes more as a stress dose. Please increase the dose as directed. 400 tablet 3     levothyroxine (SYNTHROID/LEVOTHROID) 150 MCG tablet Take 1 tablet (150 mcg) by mouth daily 90 tablet 3     metoclopramide (REGLAN) 10 MG/10ML SOLN solution Take 10 mg by mouth 4 times daily (before meals and nightly) 0800, 1200, 1600, 2000  Disconnects bag before administration, then waits 45 mins before reconnecting after giving the medication       multivitamin, therapeutic (THERA-VIT) TABS tablet Take 1 tablet by mouth daily       mupirocin (BACTROBAN) 2 % external ointment Apply topically 2 times daily as needed        pantoprazole (PROTONIX) 2 mg/mL SUSP suspension 20 mLs (40 mg) by Per J Tube route daily 400 mL 0     potassium & sodium phosphates (NEUTRA-PHOS) 280-160-250 MG Packet Take 1 packet by mouth 3 times daily        Scopolamine HBr POWD Dispense #90. Mix contents with small amount of water for admin via J-tube.  Administer 0.8 mg three times each day. (Patient taking differently: Dispense #90. Mix contents with small amount of water for admin  via J-tube.  Administer 0.8 mg three times each day as needed.) 450 g 1     Skin Protectants, Misc. (BALMEX SKIN PROTECTANT) OINT Externally apply topically 2 times daily as needed (irritation) Applay to reddened memo areas twice daily as needed       sodium bicarbonate 650 MG tablet Take 1 tablet (650 mg) by mouth daily 30 tablet 0     testosterone cypionate (DEPOTESTOSTERONE) 200 MG/ML injection Inject 0.3 mLs (60 mg) into the muscle every 7 days 6 mL 1     vitamin C (ASCORBIC ACID) 1000 MG TABS 1,000 mg by Oral or Feeding Tube route daily        vitamin D3 (CHOLECALCIFEROL) 2000 units (50 mcg) tablet Take 2,000 Units by mouth daily Crush and feed via j-tube @@ 0900       Review of Systems      as per HPI above    Objective:   Mykel is engaged today.  Tries to use words to communicate.  Other exam was not completed as this was virtual visit    In House Labs:   Hemoglobin A1C POCT   Date Value Ref Range Status   05/23/2021 5.6 0 - 5.6 % Final     Comment:     Normal <5.7% Prediabetes 5.7-6.4%  Diabetes 6.5% or higher - adopted from ADA   consensus guidelines.     08/12/2020 5.9 (H) 0 - 5.6 % Final     Comment:     Normal <5.7% Prediabetes 5.7-6.4%  Diabetes 6.5% or higher - adopted from ADA   consensus guidelines.     08/12/2019 6.1 (H) 0 - 5.6 % Final     Comment:     Normal <5.7% Prediabetes 5.7-6.4%  Diabetes 6.5% or higher - adopted from ADA   consensus guidelines.         T4 Free   Date Value Ref Range Status   04/22/2021 1.40 0.76 - 1.46 ng/dL Final     Free T4   Date Value Ref Range Status   12/12/2021 1.04 0.76 - 1.46 ng/dL Final         Creatinine   Date Value Ref Range Status   12/27/2021 0.90 0.66 - 1.25 mg/dL Final   05/24/2021 0.80 0.66 - 1.25 mg/dL Final   ]  Last Comprehensive Metabolic Panel:  Sodium   Date Value Ref Range Status   12/27/2021 141 133 - 144 mmol/L Final   05/24/2021 143 133 - 144 mmol/L Final     Potassium   Date Value Ref Range Status   12/27/2021 3.9 3.4 - 5.3 mmol/L Final    05/24/2021 4.1 3.4 - 5.3 mmol/L Final     Chloride   Date Value Ref Range Status   12/27/2021 109 94 - 109 mmol/L Final   05/24/2021 112 (H) 94 - 109 mmol/L Final     Carbon Dioxide   Date Value Ref Range Status   05/24/2021 27 20 - 32 mmol/L Final     Carbon Dioxide (CO2)   Date Value Ref Range Status   12/27/2021 30 20 - 32 mmol/L Final     Anion Gap   Date Value Ref Range Status   12/27/2021 2 (L) 3 - 14 mmol/L Final   05/24/2021 4 3 - 14 mmol/L Final     Glucose   Date Value Ref Range Status   12/27/2021 115 (H) 70 - 99 mg/dL Final   05/24/2021 141 (H) 70 - 99 mg/dL Final     Urea Nitrogen   Date Value Ref Range Status   12/27/2021 16 7 - 30 mg/dL Final   05/24/2021 9 7 - 30 mg/dL Final     Creatinine   Date Value Ref Range Status   12/27/2021 0.90 0.66 - 1.25 mg/dL Final   05/24/2021 0.80 0.66 - 1.25 mg/dL Final     GFR Estimate   Date Value Ref Range Status   12/27/2021 >90 >60 mL/min/1.73m2 Final     Comment:     Effective December 21, 2021 eGFRcr in adults is calculated using the 2021 CKD-EPI creatinine equation which includes age and gender (Kyung barraza al., NEJ, DOI: 10.1056/BBSUsn9884049)   05/24/2021 >90 >60 mL/min/[1.73_m2] Final     Comment:     Non  GFR Calc  Starting 12/18/2018, serum creatinine based estimated GFR (eGFR) will be   calculated using the Chronic Kidney Disease Epidemiology Collaboration   (CKD-EPI) equation.       Calcium   Date Value Ref Range Status   12/27/2021 8.7 8.5 - 10.1 mg/dL Final   05/24/2021 8.3 (L) 8.5 - 10.1 mg/dL Final     Video-Visit Details  Type of service:  Video Visit  Video Start Time: 1:39  Video End Time: 1:58  Originating Location (pt. Location): Home  [place]  [facility] [place] video and phone call used for this visit.   More than minutes spent on the date of the encounter doing chart review, history, documentation and further activities per the note.

## 2022-01-05 NOTE — PATIENT INSTRUCTIONS
Mykel Nuñez   It was a pleasure meeting you.    Secondary adrenal insufficiency  -Hydrocortisone (HC) 15 mg in the AM, 5 mg at 3:00 am.   - sick day rules - increase hydrocortisone to 3X of the maintenance dose during sickness like flu.  (for eg. Hydrocortisone to be increased to 15 mg 3 times per day during illness until he gets better). Then after he improves from illness; dose needs to go back to his usual dose of 15 mg in the morning and 5 mg in the afternoon.   -Needs injection form of hydrocortisone if unable to keep medication down due to vomiting.    - use Injectable form of hydrocortisone as needed.     Central hypothyroidism   -continue Levothyroxine 150 mcg daily     Central hypogonadism  -  testosterone dose to 0.3 ml (60 mg) every 7 days        Central diabetes insipidus   -Follow free water replacement therapy as previously advised  - change in mental status from baseline should prompt sodium level checks.   - If urine output increases to more than 3 L- please check sodium level.   - There is a standing order for Sodium checks as needed. Check a follow up sodium.        Please let us know if any questions.

## 2022-01-23 DIAGNOSIS — E23.0 PANHYPOPITUITARISM (H): ICD-10-CM

## 2022-01-23 DIAGNOSIS — E29.1 HYPOGONADISM MALE: ICD-10-CM

## 2022-01-25 NOTE — TELEPHONE ENCOUNTER
TESTOSTERONE CYP 200MG/ML SDV 1ML      Last Written Prescription Date:  7-  Last Fill Quantity: 6ML,   # refills: 1  Last Office Visit : 1-5-2022  Future Office visit:  NONE    Routing refill request to provider for review/approval because:  CONTROLLED MEDICATION        Kathleen M Doege RN

## 2022-01-26 RX ORDER — TESTOSTERONE CYPIONATE 200 MG/ML
60 INJECTION, SOLUTION INTRAMUSCULAR
Qty: 4 ML | Refills: 1 | Status: ON HOLD | OUTPATIENT
Start: 2022-01-26 | End: 2022-06-17

## 2022-03-18 ENCOUNTER — APPOINTMENT (OUTPATIENT)
Dept: INTERVENTIONAL RADIOLOGY/VASCULAR | Facility: CLINIC | Age: 62
End: 2022-03-18
Attending: PHYSICIAN ASSISTANT
Payer: MEDICARE

## 2022-03-18 ENCOUNTER — HOSPITAL ENCOUNTER (OUTPATIENT)
Facility: CLINIC | Age: 62
Discharge: HOME | End: 2022-03-18
Attending: RADIOLOGY | Admitting: RADIOLOGY
Payer: MEDICARE

## 2022-03-18 VITALS
HEART RATE: 68 BPM | BODY MASS INDEX: 21.67 KG/M2 | WEIGHT: 160 LBS | DIASTOLIC BLOOD PRESSURE: 60 MMHG | HEIGHT: 72 IN | SYSTOLIC BLOOD PRESSURE: 110 MMHG | TEMPERATURE: 96.2 F | OXYGEN SATURATION: 100 % | RESPIRATION RATE: 16 BRPM

## 2022-03-18 DIAGNOSIS — R13.10 DYSPHAGIA, UNSPECIFIED TYPE: Primary | ICD-10-CM

## 2022-03-18 DIAGNOSIS — R13.10 DYSPHAGIA, UNSPECIFIED TYPE: ICD-10-CM

## 2022-03-18 PROCEDURE — 272N000584 ZZ HC TUBE GASTRO CR13

## 2022-03-18 PROCEDURE — 272N000116 HC CATH CR1

## 2022-03-18 PROCEDURE — 49452 REPLACE G-J TUBE PERC: CPT

## 2022-03-18 PROCEDURE — 999N000163 HC STATISTIC SIMPLE TUBE INSERTION/CHARGE, PORT, CATH, FISTULOGRAM

## 2022-03-18 PROCEDURE — C1769 GUIDE WIRE: HCPCS

## 2022-03-18 NOTE — IR NOTE
Interventional Radiology Intra-procedural Nursing Note    Patient Name: Keyon Farias  Medical Record Number: [id number]  Today's Date: March 18, 2022    Procedure: GJ tube exchange  Start time: 1436  End time: 1440  Report provided to: [facility]  Patient depart time and location: 1445 to Care Suites    Note: Patient entered Interventional Radiology Suite number 2 via cart. Patient awake, alert and orientated. Assisted onto procedural table in supine position. Prepped and draped.  Dr. Dunn in room. Time out and procedure started.    Procedure well tolerated by patient without complications. Procedure end with debrief by Dr. Dunn.

## 2022-03-18 NOTE — DISCHARGE INSTRUCTIONS
G-tube Exchange Discharge Instructions     After you go home:      You may resume your normal diet    Care of Insertion Site:      For the first 48 hrs, check your puncture site every couple hours while you are awake     You may remove/change the dressing tomorrow    You may shower tomorrow    No tub baths, whirlpools or swimming until your puncture site has fully healed     Activity       You may go back to normal activity in 24 hours    Wait 48 hours before lifting, straining, exercise or other strenuous activity    Medicines:      You may resume all medications    Resume your Warfarin/Coumadin at your regular dose today. Follow up with your provider to have your INR rechecked    Resume your Platelet Inhibitors and Aspirin tomorrow at your regular dose    For minor pain, you may take Acetaminophen (Tylenol) or Ibuprofen (Advil)                 Call the provider who ordered this procedure if:      Blood or fluid is draining from the site    The site is red, swollen, hot, tender or there is foul-smelling drainage    Chills or a fever greater than 101 F (38 C)    Increased pain at the site    Any questions or concerns    Call  911 or go to the Emergency Room if:      Severe pain or trouble breathing    Bleeding that you cannot control      If you have questions call:          [facility] Radiology Dept @ [phone]        The provider who performed your procedure was _________________.        Caring for your G-tube    Tube Maintenance:    For ENFit Tubes:      Do NOT over tighten the connections (the purple end & hub connection).      Clean the connecting ends (especially the hub) every day.      If you are unable to disconnect the tubing ends, soak the connection in warm water (or wrap in a wet warm washcloth) to try and loosen the connection.    Possible problems with your tube may include:      Clogged with medications or feedings - most obstructions can be cleared with a small (3cc) syringe and warm  water. This may be repeated until the tube is unclogged. This can be prevented by frequently flushing the tube with water (60cc) during the day and always after medications & feedings.      Tube pulls out or falls out -cover the opening with gauze & tape. Call [phone] for further instructions      Skin breakdown and/or yeast infections at the insertion site - use of skin barrier ointments and anti-fungal powders can treat most site irritations.  Ask your pharmacist or provider for assistance (a prescription is not necessary).    In general, tube problems (including pulled tubes) are NOT emergency situations. Unless the pulling out of a tube is accompanied by uncontrollable bleeding, please DO NOT GO TO THE EMERGENCY ROOM!  Call [phone] with problems.    Tube Care:      Change the gauze dressing every 24 hours and if soiled (dirty).  Stabilize all tubes securely by using gauze and tape.  Clean tube site with soap & water using a cotton applicator (Q-tip) as needed to prevent irritation.    Flush feeding tube with 60cc of warm or room temperature water before and after meds.  To prevent the tube from clogging, ask your provider or pharmacist if liquid forms of your medications are available. If not, crush the pills well & be sure to flush the tube before & after all medications.    Flush feeding tube a minimum of every 4 hours and when feeding is completed with 60cc of water to keep the tube clear and avoid clogging.    Pt may use an abdominal (waist) binder to protect the G-tube.    If there is continued oozing or bleeding, redness, yellow/green/foul smelling drainage    STOP the feedings & use of the tube immediately if there is:      Continued oozing or bleeding at the site    Redness    Yellow/green or foul smelling drainage at the site    Uncontrolled stomach pain    Many of the supplies mentioned above can be purchased at your local pharmacy      For issues with your tube, please call:    [place]  Interventional Radiology Dept at [phone]

## 2022-03-25 ENCOUNTER — MEDICAL CORRESPONDENCE (OUTPATIENT)
Dept: HEALTH INFORMATION MANAGEMENT | Facility: CLINIC | Age: 62
End: 2022-03-25
Payer: MEDICARE

## 2022-04-05 NOTE — TELEPHONE ENCOUNTER
Pt last seen sept 2019. Was supposed to follow up in 3 months. Please advise if ok to refill medication.   Lakia Barton RN BSN   Ketoconazole Pregnancy And Lactation Text: This medication is Pregnancy Category C and it isn't know if it is safe during pregnancy. It is also excreted in breast milk and breast feeding isn't recommended.

## 2022-04-15 ENCOUNTER — LAB (OUTPATIENT)
Dept: LAB | Facility: CLINIC | Age: 62
End: 2022-04-15
Payer: MEDICARE

## 2022-04-15 DIAGNOSIS — E29.1 HYPOGONADISM MALE: ICD-10-CM

## 2022-04-15 DIAGNOSIS — G40.909 EPILEPSY (H): ICD-10-CM

## 2022-04-15 LAB
AMMONIA PLAS-SCNC: 23 UMOL/L (ref 10–50)
SODIUM SERPL-SCNC: 128 MMOL/L (ref 133–144)

## 2022-04-15 PROCEDURE — 82140 ASSAY OF AMMONIA: CPT

## 2022-04-15 PROCEDURE — 84403 ASSAY OF TOTAL TESTOSTERONE: CPT

## 2022-04-15 PROCEDURE — 84295 ASSAY OF SERUM SODIUM: CPT

## 2022-04-15 PROCEDURE — 36415 COLL VENOUS BLD VENIPUNCTURE: CPT

## 2022-04-15 PROCEDURE — 80161 ASY CARBAMAZEPIN 10,11-EPXID: CPT

## 2022-04-19 LAB — TESTOST SERPL-MCNC: 558 NG/DL (ref 240–950)

## 2022-04-26 ENCOUNTER — TELEPHONE (OUTPATIENT)
Dept: ENDOCRINOLOGY | Facility: CLINIC | Age: 62
End: 2022-04-26
Payer: MEDICARE

## 2022-04-26 LAB
CARBAMAZEPINE EP SERPL-MCNC: 4.6 UG/ML
CARBAMAZEPINE SERPL-MCNC: 9.7 UG/ML

## 2022-04-26 NOTE — TELEPHONE ENCOUNTER
----- Message from Faizan Mclean MD sent at 4/26/2022 12:24 PM CDT -----  Please inform guardian that testosterone is slightly higher than desired. We will discuss dose adjustment at routine follow up appointment.

## 2022-04-26 NOTE — RESULT ENCOUNTER NOTE
Please inform guardian that testosterone is slightly higher than desired. We will discuss dose adjustment at routine follow up appointment.

## 2022-04-26 NOTE — TELEPHONE ENCOUNTER
Spoke w/ Pt's guardian: Confirms understanding of review and recommendations. No questions at this time.   Etta Curry RN on 4/26/2022 at 12:54 PM       Faizan Coon MD  [facility]  Please inform guardian that testosterone is slightly higher than desired. We will discuss dose adjustment at routine follow up appointment.

## 2022-04-28 DIAGNOSIS — J69.0 ASPIRATION PNEUMONIA OF BOTH LUNGS DUE TO GASTRIC SECRETIONS, UNSPECIFIED PART OF LUNG (H): ICD-10-CM

## 2022-05-09 ENCOUNTER — HOSPITAL ENCOUNTER (INPATIENT)
Facility: CLINIC | Age: 62
LOS: 1 days | Discharge: HOME | DRG: 644 | End: 2022-05-12
Attending: EMERGENCY MEDICINE | Admitting: INTERNAL MEDICINE
Payer: MEDICARE

## 2022-05-09 ENCOUNTER — APPOINTMENT (OUTPATIENT)
Dept: GENERAL RADIOLOGY | Facility: CLINIC | Age: 62
DRG: 644 | End: 2022-05-09
Attending: EMERGENCY MEDICINE
Payer: MEDICARE

## 2022-05-09 DIAGNOSIS — M62.81 GENERALIZED MUSCLE WEAKNESS: ICD-10-CM

## 2022-05-09 DIAGNOSIS — R13.10 DYSPHAGIA, UNSPECIFIED TYPE: ICD-10-CM

## 2022-05-09 DIAGNOSIS — R05.9 COUGH: ICD-10-CM

## 2022-05-09 DIAGNOSIS — E87.1 HYPONATREMIA: ICD-10-CM

## 2022-05-09 DIAGNOSIS — G40.909 RECURRENT SEIZURES (H): Primary | ICD-10-CM

## 2022-05-09 LAB
ALBUMIN SERPL-MCNC: 3.3 G/DL (ref 3.4–5)
ALBUMIN UR-MCNC: 20 MG/DL
ALP SERPL-CCNC: 126 U/L (ref 40–150)
ALT SERPL W P-5'-P-CCNC: 22 U/L (ref 0–70)
ANION GAP SERPL CALCULATED.3IONS-SCNC: 5 MMOL/L (ref 3–14)
APPEARANCE UR: ABNORMAL
AST SERPL W P-5'-P-CCNC: 21 U/L (ref 0–45)
ATRIAL RATE - MUSE: 77 BPM
BASOPHILS # BLD AUTO: 0.1 10E3/UL (ref 0–0.2)
BASOPHILS NFR BLD AUTO: 1 %
BILIRUB SERPL-MCNC: 0.5 MG/DL (ref 0.2–1.3)
BILIRUB UR QL STRIP: NEGATIVE
BUN SERPL-MCNC: 13 MG/DL (ref 7–30)
CALCIUM SERPL-MCNC: 8.5 MG/DL (ref 8.5–10.1)
CARBAMAZEPINE SERPL-MCNC: 13.2 MG/L
CHLORIDE BLD-SCNC: 96 MMOL/L (ref 94–109)
CO2 SERPL-SCNC: 27 MMOL/L (ref 20–32)
COLOR UR AUTO: YELLOW
CREAT SERPL-MCNC: 0.64 MG/DL (ref 0.66–1.25)
DIASTOLIC BLOOD PRESSURE - MUSE: NORMAL MMHG
EOSINOPHIL # BLD AUTO: 0.5 10E3/UL (ref 0–0.7)
EOSINOPHIL NFR BLD AUTO: 5 %
ERYTHROCYTE [DISTWIDTH] IN BLOOD BY AUTOMATED COUNT: 12.9 % (ref 10–15)
FLUAV RNA SPEC QL NAA+PROBE: NEGATIVE
FLUBV RNA RESP QL NAA+PROBE: NEGATIVE
GFR SERPL CREATININE-BSD FRML MDRD: >90 ML/MIN/1.73M2
GLUCOSE BLD-MCNC: 87 MG/DL (ref 70–99)
GLUCOSE BLDC GLUCOMTR-MCNC: 86 MG/DL (ref 70–99)
GLUCOSE UR STRIP-MCNC: NEGATIVE MG/DL
GRANULAR CAST: 5 /LPF
HCT VFR BLD AUTO: 42.1 % (ref 40–53)
HGB BLD-MCNC: 14.6 G/DL (ref 13.3–17.7)
HGB UR QL STRIP: NEGATIVE
IMM GRANULOCYTES # BLD: 0 10E3/UL
IMM GRANULOCYTES NFR BLD: 0 %
INTERPRETATION ECG - MUSE: NORMAL
KETONES UR STRIP-MCNC: NEGATIVE MG/DL
LACTATE SERPL-SCNC: 1.4 MMOL/L (ref 0.7–2)
LEUKOCYTE ESTERASE UR QL STRIP: NEGATIVE
LYMPHOCYTES # BLD AUTO: 2.2 10E3/UL (ref 0.8–5.3)
LYMPHOCYTES NFR BLD AUTO: 21 %
MAGNESIUM SERPL-MCNC: 2.2 MG/DL (ref 1.6–2.3)
MCH RBC QN AUTO: 29.6 PG (ref 26.5–33)
MCHC RBC AUTO-ENTMCNC: 34.7 G/DL (ref 31.5–36.5)
MCV RBC AUTO: 85 FL (ref 78–100)
MONOCYTES # BLD AUTO: 0.8 10E3/UL (ref 0–1.3)
MONOCYTES NFR BLD AUTO: 8 %
MUCOUS THREADS #/AREA URNS LPF: PRESENT /LPF
NEUTROPHILS # BLD AUTO: 6.7 10E3/UL (ref 1.6–8.3)
NEUTROPHILS NFR BLD AUTO: 65 %
NITRATE UR QL: NEGATIVE
NRBC # BLD AUTO: 0 10E3/UL
NRBC BLD AUTO-RTO: 0 /100
P AXIS - MUSE: 57 DEGREES
PH UR STRIP: 8 [PH] (ref 5–7)
PHOSPHATE SERPL-MCNC: 2.3 MG/DL (ref 2.5–4.5)
PLATELET # BLD AUTO: 179 10E3/UL (ref 150–450)
POTASSIUM BLD-SCNC: 4.2 MMOL/L (ref 3.4–5.3)
PR INTERVAL - MUSE: 178 MS
PROCALCITONIN SERPL-MCNC: <0.05 NG/ML
PROT SERPL-MCNC: 7.6 G/DL (ref 6.8–8.8)
QRS DURATION - MUSE: 94 MS
QT - MUSE: 388 MS
QTC - MUSE: 439 MS
R AXIS - MUSE: -46 DEGREES
RBC # BLD AUTO: 4.94 10E6/UL (ref 4.4–5.9)
RBC URINE: 1 /HPF
RSV RNA SPEC NAA+PROBE: NEGATIVE
SARS-COV-2 RNA RESP QL NAA+PROBE: NEGATIVE
SODIUM SERPL-SCNC: 128 MMOL/L (ref 133–144)
SODIUM SERPL-SCNC: 128 MMOL/L (ref 133–144)
SP GR UR STRIP: 1.02 (ref 1–1.03)
SYSTOLIC BLOOD PRESSURE - MUSE: NORMAL MMHG
T AXIS - MUSE: 47 DEGREES
T4 FREE SERPL-MCNC: 1.42 NG/DL (ref 0.76–1.46)
TRI-PHOS CRY #/AREA URNS HPF: ABNORMAL /HPF
TROPONIN I SERPL HS-MCNC: 5 NG/L
TSH SERPL DL<=0.005 MIU/L-ACNC: <0.01 MU/L (ref 0.4–4)
UROBILINOGEN UR STRIP-MCNC: 12 MG/DL
VENTRICULAR RATE- MUSE: 77 BPM
WBC # BLD AUTO: 10.3 10E3/UL (ref 4–11)
WBC URINE: 0 /HPF

## 2022-05-09 PROCEDURE — 258N000003 HC RX IP 258 OP 636: Performed by: EMERGENCY MEDICINE

## 2022-05-09 PROCEDURE — 87077 CULTURE AEROBIC IDENTIFY: CPT | Performed by: EMERGENCY MEDICINE

## 2022-05-09 PROCEDURE — 96361 HYDRATE IV INFUSION ADD-ON: CPT

## 2022-05-09 PROCEDURE — 999N000157 HC STATISTIC RCP TIME EA 10 MIN

## 2022-05-09 PROCEDURE — 84439 ASSAY OF FREE THYROXINE: CPT | Performed by: EMERGENCY MEDICINE

## 2022-05-09 PROCEDURE — 96360 HYDRATION IV INFUSION INIT: CPT

## 2022-05-09 PROCEDURE — 71045 X-RAY EXAM CHEST 1 VIEW: CPT

## 2022-05-09 PROCEDURE — C9803 HOPD COVID-19 SPEC COLLECT: HCPCS

## 2022-05-09 PROCEDURE — 85025 COMPLETE CBC W/AUTO DIFF WBC: CPT | Performed by: EMERGENCY MEDICINE

## 2022-05-09 PROCEDURE — G0378 HOSPITAL OBSERVATION PER HR: HCPCS

## 2022-05-09 PROCEDURE — 250N000009 HC RX 250: Performed by: INTERNAL MEDICINE

## 2022-05-09 PROCEDURE — 36415 COLL VENOUS BLD VENIPUNCTURE: CPT | Performed by: EMERGENCY MEDICINE

## 2022-05-09 PROCEDURE — 99285 EMERGENCY DEPT VISIT HI MDM: CPT | Mod: 25

## 2022-05-09 PROCEDURE — 250N000011 HC RX IP 250 OP 636: Performed by: INTERNAL MEDICINE

## 2022-05-09 PROCEDURE — 84100 ASSAY OF PHOSPHORUS: CPT | Performed by: INTERNAL MEDICINE

## 2022-05-09 PROCEDURE — 99220 PR INITIAL OBSERVATION CARE,LEVEL III: CPT | Performed by: INTERNAL MEDICINE

## 2022-05-09 PROCEDURE — 87637 SARSCOV2&INF A&B&RSV AMP PRB: CPT | Performed by: EMERGENCY MEDICINE

## 2022-05-09 PROCEDURE — 84484 ASSAY OF TROPONIN QUANT: CPT | Performed by: EMERGENCY MEDICINE

## 2022-05-09 PROCEDURE — 82962 GLUCOSE BLOOD TEST: CPT

## 2022-05-09 PROCEDURE — 83605 ASSAY OF LACTIC ACID: CPT | Performed by: EMERGENCY MEDICINE

## 2022-05-09 PROCEDURE — 83735 ASSAY OF MAGNESIUM: CPT | Performed by: INTERNAL MEDICINE

## 2022-05-09 PROCEDURE — 94640 AIRWAY INHALATION TREATMENT: CPT

## 2022-05-09 PROCEDURE — 250N000013 HC RX MED GY IP 250 OP 250 PS 637: Performed by: INTERNAL MEDICINE

## 2022-05-09 PROCEDURE — 80156 ASSAY CARBAMAZEPINE TOTAL: CPT | Performed by: EMERGENCY MEDICINE

## 2022-05-09 PROCEDURE — 84443 ASSAY THYROID STIM HORMONE: CPT | Performed by: EMERGENCY MEDICINE

## 2022-05-09 PROCEDURE — 84145 PROCALCITONIN (PCT): CPT | Performed by: EMERGENCY MEDICINE

## 2022-05-09 PROCEDURE — 87149 DNA/RNA DIRECT PROBE: CPT | Performed by: EMERGENCY MEDICINE

## 2022-05-09 PROCEDURE — 80053 COMPREHEN METABOLIC PANEL: CPT | Performed by: EMERGENCY MEDICINE

## 2022-05-09 PROCEDURE — 81001 URINALYSIS AUTO W/SCOPE: CPT | Performed by: EMERGENCY MEDICINE

## 2022-05-09 PROCEDURE — 93005 ELECTROCARDIOGRAM TRACING: CPT

## 2022-05-09 PROCEDURE — 84295 ASSAY OF SERUM SODIUM: CPT | Performed by: INTERNAL MEDICINE

## 2022-05-09 PROCEDURE — 87086 URINE CULTURE/COLONY COUNT: CPT | Performed by: EMERGENCY MEDICINE

## 2022-05-09 PROCEDURE — 36415 COLL VENOUS BLD VENIPUNCTURE: CPT | Performed by: INTERNAL MEDICINE

## 2022-05-09 RX ORDER — BACITRACIN ZINC 500 [USP'U]/G
OINTMENT TOPICAL DAILY PRN
Status: DISCONTINUED | OUTPATIENT
Start: 2022-05-09 | End: 2022-05-12 | Stop reason: HOSPADM

## 2022-05-09 RX ORDER — LEVOTHYROXINE SODIUM 75 UG/1
150 TABLET ORAL DAILY
Status: DISCONTINUED | OUTPATIENT
Start: 2022-05-10 | End: 2022-05-12 | Stop reason: HOSPADM

## 2022-05-09 RX ORDER — ONDANSETRON 4 MG/1
4 TABLET, ORALLY DISINTEGRATING ORAL EVERY 6 HOURS PRN
Status: DISCONTINUED | OUTPATIENT
Start: 2022-05-09 | End: 2022-05-12 | Stop reason: HOSPADM

## 2022-05-09 RX ORDER — CARBAMAZEPINE 100 MG/5ML
150 SUSPENSION ORAL 3 TIMES DAILY
Status: DISCONTINUED | OUTPATIENT
Start: 2022-05-10 | End: 2022-05-12 | Stop reason: HOSPADM

## 2022-05-09 RX ORDER — DEXTROSE MONOHYDRATE 100 MG/ML
INJECTION, SOLUTION INTRAVENOUS CONTINUOUS PRN
Status: DISCONTINUED | OUTPATIENT
Start: 2022-05-09 | End: 2022-05-12 | Stop reason: HOSPADM

## 2022-05-09 RX ORDER — METOCLOPRAMIDE HYDROCHLORIDE 5 MG/5ML
10 SOLUTION ORAL
Status: DISCONTINUED | OUTPATIENT
Start: 2022-05-09 | End: 2022-05-12 | Stop reason: HOSPADM

## 2022-05-09 RX ORDER — HYDROCORTISONE 5 MG/1
15 TABLET ORAL
Status: DISCONTINUED | OUTPATIENT
Start: 2022-05-10 | End: 2022-05-12 | Stop reason: HOSPADM

## 2022-05-09 RX ORDER — ACETYLCYSTEINE 200 MG/ML
2 SOLUTION ORAL; RESPIRATORY (INHALATION) 4 TIMES DAILY
Status: DISCONTINUED | OUTPATIENT
Start: 2022-05-09 | End: 2022-05-12 | Stop reason: HOSPADM

## 2022-05-09 RX ORDER — HYDROCORTISONE 5 MG/1
5 TABLET ORAL DAILY
Status: DISCONTINUED | OUTPATIENT
Start: 2022-05-09 | End: 2022-05-12 | Stop reason: HOSPADM

## 2022-05-09 RX ORDER — BISACODYL 10 MG
10 SUPPOSITORY, RECTAL RECTAL DAILY PRN
Status: DISCONTINUED | OUTPATIENT
Start: 2022-05-09 | End: 2022-05-12 | Stop reason: HOSPADM

## 2022-05-09 RX ORDER — SODIUM CHLORIDE AND POTASSIUM CHLORIDE 150; 900 MG/100ML; MG/100ML
INJECTION, SOLUTION INTRAVENOUS CONTINUOUS
Status: DISCONTINUED | OUTPATIENT
Start: 2022-05-09 | End: 2022-05-10

## 2022-05-09 RX ORDER — ASPIRIN 81 MG/1
81 TABLET, CHEWABLE ORAL DAILY
Status: DISCONTINUED | OUTPATIENT
Start: 2022-05-10 | End: 2022-05-12 | Stop reason: HOSPADM

## 2022-05-09 RX ORDER — ALBUTEROL SULFATE 0.83 MG/ML
2.5 SOLUTION RESPIRATORY (INHALATION)
Status: DISCONTINUED | OUTPATIENT
Start: 2022-05-09 | End: 2022-05-12 | Stop reason: HOSPADM

## 2022-05-09 RX ORDER — MAGNESIUM HYDROXIDE/ALUMINUM HYDROXICE/SIMETHICONE 120; 1200; 1200 MG/30ML; MG/30ML; MG/30ML
15 SUSPENSION ORAL ONCE
Status: DISCONTINUED | OUTPATIENT
Start: 2022-05-09 | End: 2022-05-09

## 2022-05-09 RX ORDER — ASCORBIC ACID 500 MG
1000 TABLET ORAL DAILY
Status: DISCONTINUED | OUTPATIENT
Start: 2022-05-09 | End: 2022-05-12 | Stop reason: HOSPADM

## 2022-05-09 RX ORDER — PROCHLORPERAZINE MALEATE 10 MG
10 TABLET ORAL EVERY 6 HOURS PRN
Status: DISCONTINUED | OUTPATIENT
Start: 2022-05-09 | End: 2022-05-12 | Stop reason: HOSPADM

## 2022-05-09 RX ORDER — GUAIFENESIN 600 MG/1
15 TABLET, EXTENDED RELEASE ORAL DAILY
Status: DISCONTINUED | OUTPATIENT
Start: 2022-05-09 | End: 2022-05-12 | Stop reason: HOSPADM

## 2022-05-09 RX ORDER — MULTIVITAMIN,THERAPEUTIC
1 TABLET ORAL DAILY
Status: DISCONTINUED | OUTPATIENT
Start: 2022-05-09 | End: 2022-05-09

## 2022-05-09 RX ORDER — SCOPOLAMINE HYDROBROMIDE
0.8 POWDER (GRAM) MISCELLANEOUS 3 TIMES DAILY PRN
Status: DISCONTINUED | OUTPATIENT
Start: 2022-05-09 | End: 2022-05-12 | Stop reason: HOSPADM

## 2022-05-09 RX ORDER — SODIUM BICARBONATE 650 MG/1
650 TABLET ORAL DAILY
Status: DISCONTINUED | OUTPATIENT
Start: 2022-05-10 | End: 2022-05-10

## 2022-05-09 RX ORDER — ACETAMINOPHEN 325 MG/1
650 TABLET ORAL EVERY 8 HOURS PRN
Status: DISCONTINUED | OUTPATIENT
Start: 2022-05-09 | End: 2022-05-12 | Stop reason: HOSPADM

## 2022-05-09 RX ORDER — PROCHLORPERAZINE 25 MG
25 SUPPOSITORY, RECTAL RECTAL EVERY 12 HOURS PRN
Status: DISCONTINUED | OUTPATIENT
Start: 2022-05-09 | End: 2022-05-12 | Stop reason: HOSPADM

## 2022-05-09 RX ORDER — CARBAMAZEPINE 100 MG/5ML
100 SUSPENSION ORAL EVERY 24 HOURS
Status: DISCONTINUED | OUTPATIENT
Start: 2022-05-09 | End: 2022-05-12 | Stop reason: HOSPADM

## 2022-05-09 RX ORDER — ONDANSETRON 2 MG/ML
4 INJECTION INTRAMUSCULAR; INTRAVENOUS EVERY 6 HOURS PRN
Status: DISCONTINUED | OUTPATIENT
Start: 2022-05-09 | End: 2022-05-12 | Stop reason: HOSPADM

## 2022-05-09 RX ADMIN — ACETYLCYSTEINE 2 ML: 200 SOLUTION ORAL; RESPIRATORY (INHALATION) at 18:59

## 2022-05-09 RX ADMIN — OXYCODONE HYDROCHLORIDE AND ACETAMINOPHEN 1000 MG: 500 TABLET ORAL at 17:00

## 2022-05-09 RX ADMIN — POTASSIUM CHLORIDE AND SODIUM CHLORIDE: 900; 150 INJECTION, SOLUTION INTRAVENOUS at 15:52

## 2022-05-09 RX ADMIN — POTASSIUM & SODIUM PHOSPHATES POWDER PACK 280-160-250 MG 1 PACKET: 280-160-250 PACK at 17:25

## 2022-05-09 RX ADMIN — POTASSIUM & SODIUM PHOSPHATES POWDER PACK 280-160-250 MG 1 PACKET: 280-160-250 PACK at 19:55

## 2022-05-09 RX ADMIN — BRIVARACETAM 100 MG: 10 SOLUTION ORAL at 19:59

## 2022-05-09 RX ADMIN — ALBUTEROL SULFATE 2.5 MG: 2.5 SOLUTION RESPIRATORY (INHALATION) at 18:59

## 2022-05-09 RX ADMIN — METOCLOPRAMIDE HYDROCHLORIDE 10 MG: 5 SOLUTION ORAL at 19:55

## 2022-05-09 RX ADMIN — METOCLOPRAMIDE HYDROCHLORIDE 10 MG: 5 SOLUTION ORAL at 17:20

## 2022-05-09 RX ADMIN — SODIUM CHLORIDE 1000 ML: 9 INJECTION, SOLUTION INTRAVENOUS at 12:03

## 2022-05-09 RX ADMIN — HYDROCORTISONE 5 MG: 5 TABLET ORAL at 17:29

## 2022-05-09 RX ADMIN — Medication 15 ML: at 17:34

## 2022-05-09 RX ADMIN — CARBAMAZEPINE 100 MG: 100 SUSPENSION ORAL at 17:34

## 2022-05-09 ASSESSMENT — ENCOUNTER SYMPTOMS: SHORTNESS OF BREATH: 1

## 2022-05-09 NOTE — PROGRESS NOTES
Observation goals  PRIOR TO DISCHARGE        Comments:   -diagnostic tests and consults completed and resulted: not met   -vital signs normal or at patient baseline: met   Nurse to notify provider when observation goals have been met and patient is ready for discharge.

## 2022-05-09 NOTE — ED TRIAGE NOTES
Pts mom states that patient has been more lethargic last few days. When laying flat, sounded congested. No cough. Bedbound. fever

## 2022-05-09 NOTE — PROGRESS NOTES
RECEIVING UNIT ED HANDOFF REVIEW    ED Nurse Handoff Report was reviewed by: Jessica Gracia RN on May 9, 2022 at 2:37 PM

## 2022-05-09 NOTE — CONSULTS
"Chart reviewed, asked to see patient per MD order - Registered Dietitian to order TF per Medical Nutrition Therapy Guidelines    Patient admitted under observation status, therefore a full nutrition assessment not warranted at this time.    Patient is very well known to our services d/t multiple hospital admissions in the past.    He was last seen 12/2021 and at that time was receiving the following TF regimen -->  Jevity 1.5 at 55 mL/hr = 1980 kcals, 84 gm pro, 1003 mL H20, 284 gm CHO, 27 gm fiber  Free H20 200 mL every 4 hrs (1200 mL)  Total Fluid (TF + flushes):  2203 mL (29 mL/kg)    Patient is 6'0\" and weighs 72.6 kg (160#)(3/2022)  His BMI is 21.7 kg/m^2 and is 90% IBW    Estimated Needs:  DW: 72.6 kg   Energy: 5006-0192 kcal (25-30 kcal/kg) - Maintenance   Protein:   grams (1.2-1.5 g/kg)    Noted patient on IVF at 75 mL/hr and also receiving Synthroid once daily down FT (will need to hold TF 1 hour before and 1 hour after administration)    Orders placed for the following:   Jevity 1.5 at 60 mL/hr x 22 hours per day (hold TF 1 hour before and 1 hour after Synthroid administration) - 1980 kcal (27 kcal/kg), 84 g protein (1.2 g/kg), 1003 mL H2O, 284 g CHO, 27 g fiber   Flushes 60 mL every 4 hours while on IVF    Swati Parrish, RD, LD, CNSC   Clinical Dietitian - [facility]       "

## 2022-05-09 NOTE — PLAN OF CARE
Orientation/Cognitive: alert to self only, patient only able to nod/shake head, HECTOR rest of orientation questions   Observation Goals (Met/ Not Met): not met  Mobility Level/Assist Equipment: total care/lift  Fall Risk (Y/N): yes  Behavior Concerns: none  Pain Management: declined   Tele/VS/O2: VSS on RA, tele SB/SR  ABNL Lab/BG: BG 86  Diet: NPO no exceptions  Bowel/Bladder: incontinent of bowel and bladder, condom catheter in place    Skin Concerns: flaky/dry skin noted on face/chest  Drains/Devices: G/J tube, feeding started at 1710 @60 ml/hour.   Tests/Procedures for next shift: labs  Anticipated DC date & active delays: TBD  Patient Stated Goal for Today: HECTOR
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
## 2022-05-09 NOTE — PHARMACY-ADMISSION MEDICATION HISTORY
Pharmacy Medication History  Admission medication history interview status for the 5/9/2022  admission is complete. See EPIC admission navigator for prior to admission medications     Location of Interview: Phone  Medication history sources: Patient's family/friend (mother), Surescripts and Care Everywhere    Significant changes made to the medication list:  Changed calcium carbonate BID to TID    In the past week, patient estimated taking medication this percent of the time: greater than 90%    Additional medication history information:   None    Medication reconciliation completed by provider prior to medication history? Yes    Time spent in this activity: 15 mins    Prior to Admission medications    Medication Sig Last Dose Taking? Auth Provider   acetaminophen (TYLENOL) 650 MG CR tablet Take 650 mg by mouth every 8 hours as needed for mild pain or fever  Yes Unknown, Entered By History   acetylcysteine (MUCOMYST) 20 % neb solution Take 2 mLs by nebulization 4 times daily With albuterol at 0700, 1100, 1500, and 1900 5/8/2022 at Unknown time Yes Unknown, Entered By History   albuterol (PROVENTIL) (5 MG/ML) 0.5% neb solution Take 2.5 mg by nebulization every 4 hours (while awake) 0700 1100 1500 1900 with mucomyst 5/8/2022 at Unknown time Yes Unknown, Entered By History   aspirin (ASA) 81 MG chewable tablet 81 mg by Oral or Feeding Tube route daily At 0900 5/9/2022 at Unknown time Yes Unknown, Entered By History   bacitracin ointment Apply topically daily as needed for wound care To PEG site.  Yes Unknown, Entered By History   Brivaracetam (BRIVIACT) 10 MG/ML solution 100 mg by Oral or Feeding Tube route 2 times daily 0900, 2100 5/9/2022 at 0900 Yes Unknown, Entered By History   calcium carbonate 1250 MG/5ML SUSP suspension Take 1,250 mg by mouth 3 times daily 0900, 1500, 2100 5/9/2022 at 0900 Yes Unknown, Entered By History   carBAMazepine (TEGRETOL) 100 MG/5ML suspension Take 100 mg by mouth daily Take at 1800  5/8/2022 at 1800 Yes Unknown, Entered By History   carBAMazepine (TEGRETOL) 100 MG/5ML suspension 150 mg by Oral or Feeding Tube route 3 times daily At 06:00, 12:00, and 24:00 for seizures 5/9/2022 at 1200 Yes Unknown, Entered By History   hydrocortisone (CORTAID) 1 % external cream Apply topically 2 times daily as needed Apply to reddened memo areas as needed  Yes Unknown, Entered By History   hydrocortisone (CORTEF) 5 MG tablet Take 15 mg (3 tablets) in the morning and 5 mg (1 tablet)  at 2:00 PM. During illness patient takes more as a stress dose. Please increase the dose as directed. 5/9/2022 at 0900 Yes Faizan Mclean MD   levothyroxine (SYNTHROID/LEVOTHROID) 150 MCG tablet Take 1 tablet (150 mcg) by mouth daily 5/9/2022 at 0500 Yes Faizan Mclean MD   metoclopramide (REGLAN) 10 MG/10ML SOLN solution Take 10 mg by mouth 4 times daily (before meals and nightly) 0800, 1200, 1600, 2000  Disconnects bag before administration, then waits 45 mins before reconnecting after giving the medication 5/9/2022 at 0800 Yes Unknown, Entered By History   multivitamin, therapeutic (THERA-VIT) TABS tablet Take 1 tablet by mouth daily 5/8/2022 at Unknown time Yes Reported, Patient   mupirocin (BACTROBAN) 2 % external ointment Apply topically 2 times daily as needed   Yes Reported, Patient   pantoprazole (PROTONIX) 2 mg/mL SUSP suspension 20 mLs (40 mg) by Per J Tube route daily 5/9/2022 at 0900 Yes Alvaro Barahona MD   potassium & sodium phosphates (NEUTRA-PHOS) 280-160-250 MG Packet Take 1 packet by mouth 3 times daily Takes at 0800, 1500, and 2100 5/9/2022 at Unknown time Yes Yanely Liriano MD   Scopolamine HBr POWD Dispense #90. Mix contents with small amount of water for admin via J-tube.  Administer 0.8 mg three times each day.  Patient taking differently: Dispense #90. Mix contents with small amount of water for admin via J-tube.  Administer 0.8 mg three times each day as needed.  at prn Yes Jennie Bermudez  MD Elen   Skin Protectants, Misc. (BALMEX SKIN PROTECTANT) OINT Externally apply topically 2 times daily as needed (irritation) Applay to reddened memo areas twice daily as needed  Yes Unknown, Entered By History   sodium bicarbonate 650 MG tablet Take 1 tablet (650 mg) by mouth daily 5/9/2022 at Unknown time Yes Ricky Torres MD   testosterone cypionate (DEPOTESTOSTERONE) 200 MG/ML injection Inject 0.3 mLs (60 mg) into the muscle every 7 days New dose 5/8/2022 Yes Faizan Mclean MD   vitamin C (ASCORBIC ACID) 1000 MG TABS 1,000 mg by Oral or Feeding Tube route daily  5/8/2022 at Unknown time Yes Unknown, Entered By History   vitamin D3 (CHOLECALCIFEROL) 2000 units (50 mcg) tablet Take 2,000 Units by mouth daily Crush and feed via j-tube @@ 0900 5/9/2022 at am Yes Unknown, Entered By History       The information provided in this note is only as accurate as the sources available at the time of update(s)

## 2022-05-09 NOTE — ED PROVIDER NOTES
History     Chief Complaint:  Shortness of Breath      The history is provided by the EMS personnel and the spouse (mother). The history is limited by the condition of the patient.      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 The patient has been lethargic and weak over the past 2-3 days. He is not coughing but sounds congested when laying down. He is unable to help in his cares. His condom catheter was emptied prior to arrival, and his urine was clear. He started to have secretions today and the patient's wife is worried of possible aspiration. He feels warm, but he had a blanket with a heater to help with his secretions.    Review of Systems   HENT: Positive for congestion.    Respiratory: Positive for shortness of breath.    All other systems reviewed and are negative.      Allergies:  Valproic Acid  Dilantin [Phenytoin Sodium]  Scopolamine    Medications:    acetylcysteine   albuterol  aspirin   bacitracin ointment  Brivaracetam   calcium carbonate   carbamazepine   hydrocortisone  levothyroxine   metoclopramide   mupirocin   pantoprazole  potassium & sodium phosphates   Scopolamine  sodium bicarbonate   testosterone cypionate    Past Medical History:    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  DVT  GERD  Panhypopituitarism  Sepsis due to UTI  Seizures  Spastic hemiplegia affecting dominant side  Thyroid disease  Tracheostomy care  UTI   Ventricular fibrillation  Ventricular tachyarrhythmia  Aspiration pneumonitis  Encephalopathy  Hyponatremia  Appendicitis  Hematemesis   Thrombocytopenia  Necrotizing pancreatitis  Cardiac arrest  Bladder calculus      Past Surgical History:    EGD  Head and neck surgery  gastro jejunostomy tube change  Tracheostomy  Vascular surgery    Family History:    Father:  cancer    Social History:  Presents alone via EMS    Physical Exam     Patient Vitals for the past 24 hrs:   BP Temp Temp src Pulse Resp SpO2   05/09/22 1320 -- -- -- -- -- 98 %   05/09/22 1300 108/66 -- -- 81 -- 97 %   05/09/22 1230 120/69 -- -- 80 -- 94 %   05/09/22 1150 -- -- -- -- -- 92 %   05/09/22 1130 -- -- -- -- -- 93 %   05/09/22 1110 110/68 98.7  F (37.1  C) Rectal 88 20 94 %       Physical Exam  General: Patient is alert and aphasic.  Not really answering questions.  Generally weak  HEENT: Head atraumatic    Eyes: pupils equal and reactive. Conjunctiva clear   Nares: patent   Oropharynx: no lesions, uvula midline, no palatal draping, normal voice, no trismus  Neck: Supple without lymphadenopathy, no meningismus  Chest: Heart regular rate and rhythm.   Lungs: Equal clear to auscultation with no wheeze or rales, coarse wet breath sounds and cough  Abdomen: Soft, non tender, nondistended, normal bowel sounds, G-tube in place  Back: No costovertebral angle tenderness, no midline C, T or L spine tenderness  Neuro: Generalized weakness, aphasic, not moving extremities.    Extremities: No deformities, equal radial and DP pulses. No clubbing, cyanosis.  No edema  Skin: Warm and dry with no rash.   : Condom catheter in place      Emergency Department Course   ECG:  ECG taken at 1128, ECG read at 1138  Normal sinus rhythm  Left anterior fascicular block  Abnormal ECG   Rate 77 bpm. AK interval 178 ms. QRS duration 94 ms. QT/QTc 388/439 ms. P-R-T axes 57 -46 47.     Imaging:  XR Chest Port 1 View   Final Result   IMPRESSION: No acute disease.       BERT ESPINOZA MD            SYSTEM ID:  [id number]        Report per radiology    Laboratory:  Labs Ordered and Resulted from Time of ED Arrival to Time of ED Departure   COMPREHENSIVE METABOLIC PANEL - Abnormal       Result Value    Sodium 128 (*)     Potassium 4.2      Chloride 96      Carbon Dioxide (CO2) 27      Anion Gap 5      Urea Nitrogen 13      Creatinine 0.64  (*)     Calcium 8.5      Glucose 87      Alkaline Phosphatase 126      AST 21      ALT 22      Protein Total 7.6      Albumin 3.3 (*)     Bilirubin Total 0.5      GFR Estimate >90     TSH WITH FREE T4 REFLEX - Abnormal    TSH <0.01 (*)    LACTIC ACID WHOLE BLOOD - Normal    Lactic Acid 1.4     TROPONIN I - Normal    Troponin I High Sensitivity 5     PROCALCITONIN - Normal    Procalcitonin <0.05     INFLUENZA A/B & SARS-COV2 PCR MULTIPLEX - Normal    Influenza A PCR Negative      Influenza B PCR Negative      RSV PCR Negative      SARS CoV2 PCR Negative     CARBAMAZEPINE TOTAL - Normal    Carbamazepine 13.2     T4 FREE - Normal    Free T4 1.42     CBC WITH PLATELETS AND DIFFERENTIAL    WBC Count 10.3      RBC Count 4.94      Hemoglobin 14.6      Hematocrit 42.1      MCV 85      MCH 29.6      MCHC 34.7      RDW 12.9      Platelet Count 179      % Neutrophils 65      % Lymphocytes 21      % Monocytes 8      % Eosinophils 5      % Basophils 1      % Immature Granulocytes 0      NRBCs per 100 WBC 0      Absolute Neutrophils 6.7      Absolute Lymphocytes 2.2      Absolute Monocytes 0.8      Absolute Eosinophils 0.5      Absolute Basophils 0.1      Absolute Immature Granulocytes 0.0      Absolute NRBCs 0.0     ROUTINE UA WITH MICROSCOPIC REFLEX TO CULTURE   BLOOD CULTURE   BLOOD CULTURE   URINE CULTURE     Emergency Department Course:    Reviewed:  I reviewed nursing notes, vitals, past medical history, Care Everywhere and [facility]    Assessments:  1105 I obtained history and examined the patient as noted above.   1257 I rechecked the patient and explained findings.       Consults:   1107 I spoke with the patient's spouse to get the patient's history.  1313 I spoke with Dr. Champion of the Hospitalist service to discuss the patient's findings, presentation, and plan of care.    Interventions:  1203 NS 1000 mL IV    Disposition:  The patient was admitted to the hospital under the care of Dr. Champion.     Impression & Plan      Medical  Decision Making:  Patient is a 61-year-old male with history aphasia due to TBI, cardiac arrest and history of frequent aspiration pneumonitis who presents the emergency department with generalized weakness over the last few days.  He has not been interested in his usual activities and not able to help with his cares at home.  His significant other is been unable to care for him because he is not participating in cares and turning in the bed.  Patient is done quite well over the last 5 months and stayed out of the hospital but has had frequent aspiration pneumonias in the past.  He now has a very wet cough.  Patient is afebrile.  Lactic acid was within acceptable limits and chest x-ray was negative although suspicion for aspiration pneumonia that has not yet revealed itself on chest x-ray is high.  In addition patient is found to be mildly hyponatremic.  This may be contributing to his generalized weakness as well.  Patient wears a condom cath and urinalysis is pending at this time.  No evidence of severe sepsis.  No evidence of acute coronary syndrome.  Do not suspect PE as the cause of his symptoms.  His TSH was undetectable but free T4 was within normal limits and I do not feel this is the cause for her symptoms today.  We will plan to admit the patient for ental hydration, respiratory cares and ensure that the patient's weakness improves.  Repeat x-rays can be obtained tomorrow to see if any progression to aspiration pneumonia.  Patient and significant other are agreeable with this plan.  Viral screening is negative. discussed with the hospitalist who kindly agrees accept the patient for admission.    Covid-19  Keyon Farias was evaluated during a global COVID-19 pandemic, which necessitated consideration that the patient might be at risk for infection with the SARS-CoV-2 virus that causes COVID-19.   Applicable protocols for evaluation were followed during the patient's care.   COVID-19 was considered as part of  the patient's evaluation. Test result is negative.    Diagnosis:    ICD-10-CM    1. Generalized muscle weakness  M62.81    2. Cough  R05.9    3. Hyponatremia  E87.1          Scribe Disclosure:  Ivis DYKESed, am serving as a scribe at 11:06 AM on 5/9/2022 to document services personally performed by Tamara Mcwilliams MD based on my observations and the provider's statements to me.      Tamara Mcwilliams MD  05/09/22 [id number]

## 2022-05-09 NOTE — ED NOTES
Bed: ED03  Expected date:   Expected time:   Means of arrival:   Comments:  [place] - 61 M SOB eta 105

## 2022-05-09 NOTE — ED NOTES
[facility]  ED Nurse Handoff Report    ED Chief complaint: Shortness of Breath      ED Diagnosis:   Final diagnoses:   Generalized muscle weakness   Cough   Hyponatremia       Code Status: Full Code    Allergies:   Allergies   Allergen Reactions     Valproic Acid Other (See Comments)     Toxicity w/ bone marrow suspension, elevated ammonia levels      Dilantin [Phenytoin Sodium] Other (See Comments)     Severe Trembling     Scopolamine Hives     Hives with the patch - oral no problem       Patient Story: Pts mom states that patient has been more lethargic last few days. When laying flat, sounded congested. No cough. Bedbound.      Focused Assessment:  Pt is pleasant, smiles and nods yes and no at times. Patient sounds wet in throat but lungs sound ok. Patient has a PEG tube and a condom cath on.     Treatments and/or interventions provided: labs, cultures, xray, IVF  Patient's response to treatments and/or interventions: tolerated well.     To be done/followed up on inpatient unit:  monitor    Does this patient have any cognitive concerns?: Disoriented to time, Disoriented to place and Disoriented to situation    Activity level - Baseline/Home:  Total Care  Activity Level - Current:   Total Care    Patient's Preferred language: English   Needed?: No    Isolation: None  Infection: Not Applicable  Patient tested for COVID 19 prior to admission: YES  Bariatric?: No    Vital Signs:   Vitals:    05/09/22 1130 05/09/22 1150 05/09/22 1230 05/09/22 1300   BP:   120/69 108/66   Pulse:   80 81   Resp:       Temp:       TempSrc:       SpO2: 93% 92% 94% 97%       Cardiac Rhythm:     Was the PSS-3 completed:   No - baseline ams  What interventions are required if any?               Family Comments: mother at bedside   OBS brochure/video discussed/provided to patient/family: Yes              Name of person given brochure if not patient: Savannah               Relationship to patient: mother    For the  majority of the shift this patient's behavior was Green.   Behavioral interventions performed were .    ED NURSE PHONE NUMBER: *[phone]

## 2022-05-10 LAB
ANION GAP SERPL CALCULATED.3IONS-SCNC: 5 MMOL/L (ref 3–14)
BUN SERPL-MCNC: 10 MG/DL (ref 7–30)
CALCIUM SERPL-MCNC: 8.2 MG/DL (ref 8.5–10.1)
CHLORIDE BLD-SCNC: 97 MMOL/L (ref 94–109)
CO2 SERPL-SCNC: 25 MMOL/L (ref 20–32)
CREAT SERPL-MCNC: 0.56 MG/DL (ref 0.66–1.25)
ENTEROCOCCUS FAECALIS: NOT DETECTED
ENTEROCOCCUS FAECIUM: NOT DETECTED
ERYTHROCYTE [DISTWIDTH] IN BLOOD BY AUTOMATED COUNT: 12.8 % (ref 10–15)
GFR SERPL CREATININE-BSD FRML MDRD: >90 ML/MIN/1.73M2
GLUCOSE BLD-MCNC: 131 MG/DL (ref 70–99)
HCT VFR BLD AUTO: 37.4 % (ref 40–53)
HGB BLD-MCNC: 12.5 G/DL (ref 13.3–17.7)
LISTERIA SPECIES (DETECTED/NOT DETECTED): NOT DETECTED
MCH RBC QN AUTO: 29.3 PG (ref 26.5–33)
MCHC RBC AUTO-ENTMCNC: 33.4 G/DL (ref 31.5–36.5)
MCV RBC AUTO: 88 FL (ref 78–100)
OSMOLALITY SERPL: 269 MMOL/KG (ref 275–295)
OSMOLALITY UR: 513 MMOL/KG (ref 100–1200)
PLATELET # BLD AUTO: 144 10E3/UL (ref 150–450)
POTASSIUM BLD-SCNC: 4.2 MMOL/L (ref 3.4–5.3)
RBC # BLD AUTO: 4.26 10E6/UL (ref 4.4–5.9)
SODIUM SERPL-SCNC: 127 MMOL/L (ref 133–144)
SODIUM SERPL-SCNC: 128 MMOL/L (ref 133–144)
SODIUM SERPL-SCNC: 129 MMOL/L (ref 133–144)
SODIUM UR-SCNC: 160 MMOL/L
STAPHYLOCOCCUS AUREUS: NOT DETECTED
STAPHYLOCOCCUS EPIDERMIDIS: NOT DETECTED
STAPHYLOCOCCUS LUGDUNENSIS: NOT DETECTED
STAPHYLOCOCCUS SPECIES: DETECTED
STREPTOCOCCUS AGALACTIAE: NOT DETECTED
STREPTOCOCCUS ANGINOSUS GROUP: NOT DETECTED
STREPTOCOCCUS PNEUMONIAE: NOT DETECTED
STREPTOCOCCUS PYOGENES: NOT DETECTED
STREPTOCOCCUS SPECIES: NOT DETECTED
WBC # BLD AUTO: 6.5 10E3/UL (ref 4–11)

## 2022-05-10 PROCEDURE — 94640 AIRWAY INHALATION TREATMENT: CPT | Mod: 76

## 2022-05-10 PROCEDURE — 250N000011 HC RX IP 250 OP 636: Performed by: HOSPITALIST

## 2022-05-10 PROCEDURE — 84295 ASSAY OF SERUM SODIUM: CPT | Performed by: INTERNAL MEDICINE

## 2022-05-10 PROCEDURE — 96376 TX/PRO/DX INJ SAME DRUG ADON: CPT

## 2022-05-10 PROCEDURE — 94640 AIRWAY INHALATION TREATMENT: CPT

## 2022-05-10 PROCEDURE — 99207 PR CDG-CODE CATEGORY CHANGED: CPT | Performed by: HOSPITALIST

## 2022-05-10 PROCEDURE — 250N000009 HC RX 250: Performed by: INTERNAL MEDICINE

## 2022-05-10 PROCEDURE — 999N000157 HC STATISTIC RCP TIME EA 10 MIN

## 2022-05-10 PROCEDURE — 83930 ASSAY OF BLOOD OSMOLALITY: CPT | Performed by: HOSPITALIST

## 2022-05-10 PROCEDURE — 99226 PR SUBSEQUENT OBSERVATION CARE,LEVEL III: CPT | Performed by: HOSPITALIST

## 2022-05-10 PROCEDURE — 250N000011 HC RX IP 250 OP 636: Performed by: INTERNAL MEDICINE

## 2022-05-10 PROCEDURE — 85027 COMPLETE CBC AUTOMATED: CPT | Performed by: INTERNAL MEDICINE

## 2022-05-10 PROCEDURE — G0378 HOSPITAL OBSERVATION PER HR: HCPCS

## 2022-05-10 PROCEDURE — 87040 BLOOD CULTURE FOR BACTERIA: CPT | Performed by: HOSPITALIST

## 2022-05-10 PROCEDURE — 96365 THER/PROPH/DIAG IV INF INIT: CPT

## 2022-05-10 PROCEDURE — 250N000013 HC RX MED GY IP 250 OP 250 PS 637: Performed by: HOSPITALIST

## 2022-05-10 PROCEDURE — 84295 ASSAY OF SERUM SODIUM: CPT | Performed by: HOSPITALIST

## 2022-05-10 PROCEDURE — 250N000013 HC RX MED GY IP 250 OP 250 PS 637: Performed by: INTERNAL MEDICINE

## 2022-05-10 PROCEDURE — 96361 HYDRATE IV INFUSION ADD-ON: CPT

## 2022-05-10 PROCEDURE — 36415 COLL VENOUS BLD VENIPUNCTURE: CPT | Performed by: INTERNAL MEDICINE

## 2022-05-10 PROCEDURE — 83935 ASSAY OF URINE OSMOLALITY: CPT | Performed by: HOSPITALIST

## 2022-05-10 PROCEDURE — 36415 COLL VENOUS BLD VENIPUNCTURE: CPT | Performed by: HOSPITALIST

## 2022-05-10 PROCEDURE — 96375 TX/PRO/DX INJ NEW DRUG ADDON: CPT

## 2022-05-10 PROCEDURE — 84300 ASSAY OF URINE SODIUM: CPT | Performed by: HOSPITALIST

## 2022-05-10 PROCEDURE — 250N000011 HC RX IP 250 OP 636

## 2022-05-10 PROCEDURE — 258N000003 HC RX IP 258 OP 636

## 2022-05-10 RX ORDER — SODIUM BICARBONATE 650 MG/1
650 TABLET ORAL 3 TIMES DAILY
Status: DISCONTINUED | OUTPATIENT
Start: 2022-05-10 | End: 2022-05-12

## 2022-05-10 RX ADMIN — HYDROCORTISONE SODIUM SUCCINATE 50 MG: 100 INJECTION, POWDER, FOR SOLUTION INTRAMUSCULAR; INTRAVENOUS at 20:24

## 2022-05-10 RX ADMIN — METOCLOPRAMIDE HYDROCHLORIDE 10 MG: 5 SOLUTION ORAL at 08:43

## 2022-05-10 RX ADMIN — SODIUM BICARBONATE 650 MG TABLET 650 MG: at 20:24

## 2022-05-10 RX ADMIN — BRIVARACETAM 100 MG: 10 SOLUTION ORAL at 21:23

## 2022-05-10 RX ADMIN — METOCLOPRAMIDE HYDROCHLORIDE 10 MG: 5 SOLUTION ORAL at 15:54

## 2022-05-10 RX ADMIN — ASPIRIN 81 MG CHEWABLE TABLET 81 MG: 81 TABLET CHEWABLE at 08:56

## 2022-05-10 RX ADMIN — ALBUTEROL SULFATE 2.5 MG: 2.5 SOLUTION RESPIRATORY (INHALATION) at 19:00

## 2022-05-10 RX ADMIN — POTASSIUM & SODIUM PHOSPHATES POWDER PACK 280-160-250 MG 1 PACKET: 280-160-250 PACK at 20:24

## 2022-05-10 RX ADMIN — METOCLOPRAMIDE HYDROCHLORIDE 10 MG: 5 SOLUTION ORAL at 12:12

## 2022-05-10 RX ADMIN — CARBAMAZEPINE 150 MG: 100 SUSPENSION ORAL at 00:27

## 2022-05-10 RX ADMIN — CARBAMAZEPINE 100 MG: 100 SUSPENSION ORAL at 17:40

## 2022-05-10 RX ADMIN — VANCOMYCIN HYDROCHLORIDE 1250 MG: 5 INJECTION, POWDER, LYOPHILIZED, FOR SOLUTION INTRAVENOUS at 12:25

## 2022-05-10 RX ADMIN — SODIUM BICARBONATE 650 MG TABLET 650 MG: at 13:42

## 2022-05-10 RX ADMIN — POTASSIUM CHLORIDE AND SODIUM CHLORIDE: 900; 150 INJECTION, SOLUTION INTRAVENOUS at 04:42

## 2022-05-10 RX ADMIN — ACETYLCYSTEINE 2 ML: 200 SOLUTION ORAL; RESPIRATORY (INHALATION) at 19:00

## 2022-05-10 RX ADMIN — SODIUM BICARBONATE 650 MG TABLET 650 MG: at 08:50

## 2022-05-10 RX ADMIN — Medication 15 ML: at 08:42

## 2022-05-10 RX ADMIN — ALBUTEROL SULFATE 2.5 MG: 2.5 SOLUTION RESPIRATORY (INHALATION) at 23:13

## 2022-05-10 RX ADMIN — Medication 40 MG: at 08:40

## 2022-05-10 RX ADMIN — METOCLOPRAMIDE HYDROCHLORIDE 10 MG: 5 SOLUTION ORAL at 20:24

## 2022-05-10 RX ADMIN — HYDROCORTISONE 15 MG: 10 TABLET ORAL at 08:52

## 2022-05-10 RX ADMIN — HYDROCORTISONE SODIUM SUCCINATE 50 MG: 100 INJECTION, POWDER, FOR SOLUTION INTRAMUSCULAR; INTRAVENOUS at 13:40

## 2022-05-10 RX ADMIN — ACETYLCYSTEINE 2 ML: 200 SOLUTION ORAL; RESPIRATORY (INHALATION) at 08:23

## 2022-05-10 RX ADMIN — CARBAMAZEPINE 150 MG: 100 SUSPENSION ORAL at 12:14

## 2022-05-10 RX ADMIN — POTASSIUM & SODIUM PHOSPHATES POWDER PACK 280-160-250 MG 1 PACKET: 280-160-250 PACK at 13:42

## 2022-05-10 RX ADMIN — OXYCODONE HYDROCHLORIDE AND ACETAMINOPHEN 1000 MG: 500 TABLET ORAL at 08:44

## 2022-05-10 RX ADMIN — BRIVARACETAM 100 MG: 10 SOLUTION ORAL at 08:41

## 2022-05-10 RX ADMIN — CARBAMAZEPINE 150 MG: 100 SUSPENSION ORAL at 06:14

## 2022-05-10 RX ADMIN — ACETYLCYSTEINE 2 ML: 200 SOLUTION ORAL; RESPIRATORY (INHALATION) at 12:02

## 2022-05-10 RX ADMIN — POTASSIUM & SODIUM PHOSPHATES POWDER PACK 280-160-250 MG 1 PACKET: 280-160-250 PACK at 08:48

## 2022-05-10 RX ADMIN — LEVOTHYROXINE SODIUM 150 MCG: 75 TABLET ORAL at 08:37

## 2022-05-10 RX ADMIN — ALBUTEROL SULFATE 2.5 MG: 2.5 SOLUTION RESPIRATORY (INHALATION) at 12:02

## 2022-05-10 RX ADMIN — ALBUTEROL SULFATE 2.5 MG: 2.5 SOLUTION RESPIRATORY (INHALATION) at 08:23

## 2022-05-10 NOTE — PHARMACY-VANCOMYCIN DOSING SERVICE
Pharmacy Vancomycin Initial Note  Date of Service May 10, 2022  Patient's  1960  61 year old, male    Indication: Bacteremia    Current estimated CrCl = Estimated Creatinine Clearance: 143.8 mL/min (A) (based on SCr of 0.56 mg/dL (L)).    Creatinine for last 3 days  2022: 11:22 AM Creatinine 0.64 mg/dL  5/10/2022:  7:07 AM Creatinine 0.56 mg/dL    Recent Vancomycin Level(s) for last 3 days  No results found for requested labs within last 72 hours.      Vancomycin IV Administrations (past 72 hours)      No vancomycin orders with administrations in past 72 hours.                Nephrotoxins and other renal medications (From now, onward)    Start     Dose/Rate Route Frequency Ordered Stop    05/10/22 1100  vancomycin 1250 mg in 0.9% NaCl 250 mL intermittent infusion 1,250 mg         1,250 mg  over 90 Minutes Intravenous EVERY 12 HOURS 05/10/22 1055            Contrast Orders - past 72 hours (72h ago, onward)    None          [employer] Prediction of Planned Initial Vancomycin Regimen  Loading dose: N/A  Regimen: 1250 mg IV every 12 hours.  Start time: 12:00 on 05/10/2022  Exposure target: AUC24 (range)400-600 mg/L.hr   AUC24,ss: 479 mg/L.hr  Probability of AUC24 > 400: 69 %  Ctrough,ss: 13.5 mg/L  Probability of Ctrough,ss > 20: 22 %  Probability of nephrotoxicity (Lodise ERNESTO ): 9 %        Plan:  1. Start vancomycin  1250 mg IV q12h.   2. Vancomycin monitoring method: AUC  3. Vancomycin therapeutic monitoring goal: 400-600 mg*h/L  4. Pharmacy will check vancomycin levels as appropriate in 1-3 Days.    5. Serum creatinine levels will be ordered a minimum of twice weekly.      Cynthia Verde [facility]

## 2022-05-10 NOTE — PROGRESS NOTES
Update given to guardian/mother Savannah. She states patient gets about 1000ml of free water every 12 hours, she splits this up in smaller amounts but is unable to tell me how much. She also stated that the patient also receives Pedialyte through G-tube.

## 2022-05-10 NOTE — PROGRESS NOTES
[facility]  Medicine Progress Note - Hospitalist Service    Date of Admission:  5/9/2022    Assessment & Plan            Keyon Farias is a 61 year old male admitted on 5/9/2022. 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  Patient's work-up was negative in the emergency room except mild hyponatremia at 128.  Chest x-ray was negative for any pneumonia     Hyponatremia, possible SIADH  Patient has intermittently low sodium but was normal in December 2021.  On tube feeding, his mom reports Free water flushes and Pedialyte close to 2L in 24 hours.  Sodium 128 on admission  -Serum Osm low but Urine sodium high, urine osm high and sodium has trended down with NS indicating SIADH  -DC IV fluid  -Needs water restriction, so continue free water flush but will decrease to 80 cc every 4 hours  -Increase PTA salt tablets to 3 times daily for next 2 days  -Since sodium has remained fairly stable, follow every 8 hours.    GPC bacteremia  No source of infection but blood culture 1/2 grew gram-positive cocci in clusters.  Likely staph.  -Identification is pending  -Unclear if this is contaminant versus true bacteremia.  Given patient's decreased level of consciousness/lethargy, could be infection as well.  -Repeat blood cultures x2 ordered  -Started on vancomycin  -Follow identification and further work-up/possible ID consult if not a contaminant    Addendum  GPC identified as CONS other than Staphylococcus epidermidis and Staphylococcus lugdunensis, by [employer] multiplex nucleic acid test. Discontinue abx and monitor.       Generalized weakness  Could be multifactorial.  Could be  due to mild hyponatremia or from deconditioning versus underlying infection as above  -Managing hyponatremia and possible infection as above     History of TBI    Right-sided spastic paresis,, aphasia and dysphagia;  -Continue tube feeds via GJ tube. His mom reports J tube for feeding, discussed with nursing.   -Continue his PTA meds to help with secretions  -Nutrition services consult to continue tube feeds     History of seizure disorder  Continue carbamazepine and Briviact     History of GERD  Continue Protonix    Panhypopituitarism   PTA Synthroid continued  -Receives testosterone injection weekly, continue when due  -PTA hydrocortisone changed to stress dose, reassess in 48 hours     Diet: NPO for Medical/Clinical Reasons Except for: No Exceptions  Adult Formula Drip Feeding: Continuous Jevity 1.5; Jejunostomy; Goal Rate: 60 mL/hr x 22 hrs; mL/hr; Medication - Feeding Tube Flush Frequency: At least 15-30 mL water before and after medication administration and with tube clogging; Amount to Se...    DVT Prophylaxis: Pneumatic Compression Devices  Lerma Catheter: Not present  Central Lines: None  Cardiac Monitoring: None  Code Status: Full Code      Disposition Plan   Expected Discharge: 05/12/2022     Anticipated discharge location:  Awaiting care coordination huddle  Delays:            The patient's care was discussed with the Bedside Nurse, Patient and Patient's Family.    Rupali Costa MD  Hospitalist Service  [facility]  Securely message with the Vocera Web Console (learn more here)  Text page via [facility] Paging/Directory         Clinically Significant Risk Factors Present on Admission         # Hyponatremia: Na = 127 mmol/L (Ref range: 133 - 144 mmol/L) on admission, will monitor as appropriate  # Hypocalcemia: Ca = 8.2 mg/dL (Ref range: 8.5 - 10.1 mg/dL) and/or iCa = N/A on admission, will replace as needed    # Hypoalbuminemia: Albumin = 3.3 g/dL (Ref range: 3.4 - 5.0 g/dL) on  admission, will monitor as appropriate    # Platelet Defect: home medication list includes an antiplatelet medication       ______________________________________________________________________    Interval History   Patient was evaluated this morning and visited again this afternoon when patient's mom was at the bedside.  Patient remains sleepy, arouses to voice, does not follow command, nonverbal.  Tolerating tube feeding.  Afebrile.  -Hyponatremia, sodium has slightly trended down, and 127 now    Data reviewed today: I reviewed all medications, new labs and imaging results over the last 24 hours. I personally reviewed no images or EKG's today.    Physical Exam   Vital Signs: Temp: 98.4  F (36.9  C) Temp src: Axillary BP: 109/59 Pulse: 92   Resp: 20 SpO2: 93 % O2 Device: None (Room air)    Weight: 157 lbs 12.8 oz    General Appearance: Fatigued and ill appearing, wakes up to voice.  Patient has underlying aphasia, mostly nonverbal. At baseline responds by saying yes or no giving thumbs up per his mom.   HEENT: mucosa moist. Tracheostomy scar.  Respiratory: Clear to auscultation bilaterally, no crackles or wheezing heard on auscultation.  Patient has intermittent congested cough.   Cardiovascular: Normal rate rhythm regular  GI: Soft, nontender, nondistended, no hepatosplenomegaly  Skin: Warm and dry  Musculoskeletal: No clubbing cyanosis or edema  Neurologic: Chronic right-sided spastic hemiparesis noted    Data   Recent Labs   Lab 05/10/22  1204 05/10/22  0707 05/09/22  2342 05/09/22  1943 05/09/22  1556 05/09/22  1122   WBC  --  6.5  --   --   --  10.3   HGB  --  12.5*  --   --   --  14.6   MCV  --  88  --   --   --  85   PLT  --  144*  --   --   --  179   * 127*  127* 128*   < >  --  128*   POTASSIUM  --  4.2  --   --   --  4.2   CHLORIDE  --  97  --   --   --  96   CO2  --  25  --   --   --  27   BUN  --  10  --   --   --  13   CR  --  0.56*  --   --   --  0.64*   ANIONGAP  --  5  --   --   --  5   STEVE   --  8.2*  --   --   --  8.5   GLC  --  131*  --   --  86 87   ALBUMIN  --   --   --   --   --  3.3*   PROTTOTAL  --   --   --   --   --  7.6   BILITOTAL  --   --   --   --   --  0.5   ALKPHOS  --   --   --   --   --  126   ALT  --   --   --   --   --  22   AST  --   --   --   --   --  21    < > = values in this interval not displayed.     No results found for this or any previous visit (from the past 24 hour(s)).  Medications     0.9% sodium chloride + KCl 20 mEq/L 75 mL/hr at 05/10/22 0442     dextrose         acetylcysteine  2 mL Nebulization 4x Daily     albuterol  2.5 mg Nebulization Q4H While awake     aspirin  81 mg Oral or Feeding Tube Daily     Brivaracetam  100 mg Oral or Feeding Tube BID     carBAMazepine  100 mg Oral Q24H     carBAMazepine  150 mg Oral or Feeding Tube TID     [Held by provider] hydrocortisone  15 mg Per Feeding Tube QAM AC     [Held by provider] hydrocortisone  5 mg Oral or Feeding Tube Daily     hydrocortisone sodium succinate PF  50 mg Intravenous Q8H    Followed by     [START ON 5/11/2022] hydrocortisone sodium succinate PF  50 mg Intravenous BID     levothyroxine  150 mcg Per Feeding Tube Daily     metoclopramide  10 mg Per Feeding Tube 4x Daily AC & HS     multivitamins w/minerals  15 mL Oral or Feeding Tube Daily     pantoprazole  40 mg Per J Tube Daily     potassium & sodium phosphates  1 packet Oral or Feeding Tube TID     sodium bicarbonate  650 mg Per Feeding Tube TID     vancomycin  1,250 mg Intravenous Q12H     vitamin C  1,000 mg Oral or Feeding Tube Daily

## 2022-05-10 NOTE — PLAN OF CARE
Goal Outcome Evaluation:      Orientation/Cognitive: A&O to self nonverbal  Observation Goals (Met/ Not Met): Partially met  Mobility Level/Assist Equipment: Ax2/ lift T/R  Fall Risk (Y/N): Y  Behavior Concerns: NA  Pain Management: Denies pain  Tele/VS/O2: Tele:SR, VSS on RA  ABNL Lab/BG:NA:128  Diet: NPO  Bowel/Bladder:Incontinent B/B  Skin Concerns: Scratches to bilat foot  Drains/Devices: G/J tube infusing 60ml/hr,  External catheter  Tests/Procedures for next shift: AM labs  Anticipated DC date & active delays: TBD  Patient Stated Goal for Today: Unable to state goal     Observation goals  PRIOR TO DISCHARGE        Comments:   -diagnostic tests and consults completed and resulted: not met   -vital signs normal or at patient baseline: met   Nurse to notify provider when observation goals have been met and patient is ready for discharge.

## 2022-05-10 NOTE — PLAN OF CARE
Orientation/Cognitive: alert to self only, HECTOR other orientation questions, patient is non verbal  Observation Goals (Met/ Not Met): not met  Mobility Level/Assist Equipment: total care-lift. Turn/repo Q2H   Fall Risk (Y/N): yes  Behavior Concerns: none  Pain Management: patient declined  Tele/VS/O2: Tele: SR, all other VSS on RA  ABNL Lab/BG: Na 127, osmolality 269, positive blood cultures, see MD's note  Diet: NPO no exceptions, TF @60ml with 80ml free water flushes every 4 hours   Bowel/Bladder: incontinent of bladder and bowel, 1 loose stool, using external male catheter  Skin Concerns: blanchable redness on bottom, mepilex placed  Drains/Devices: IV-SL  Tests/Procedures for next shift: Q8 hour sodium checks   Anticipated DC date & active delays: TBD  Patient Stated Goal for Today: HECTOR

## 2022-05-11 ENCOUNTER — APPOINTMENT (OUTPATIENT)
Dept: GENERAL RADIOLOGY | Facility: CLINIC | Age: 62
DRG: 644 | End: 2022-05-11
Attending: HOSPITALIST
Payer: MEDICARE

## 2022-05-11 LAB
ANION GAP SERPL CALCULATED.3IONS-SCNC: 3 MMOL/L (ref 3–14)
BACTERIA UR CULT: NORMAL
BASOPHILS # BLD AUTO: 0 10E3/UL (ref 0–0.2)
BASOPHILS NFR BLD AUTO: 0 %
BUN SERPL-MCNC: 8 MG/DL (ref 7–30)
CALCIUM SERPL-MCNC: 8.8 MG/DL (ref 8.5–10.1)
CHLORIDE BLD-SCNC: 100 MMOL/L (ref 94–109)
CO2 SERPL-SCNC: 28 MMOL/L (ref 20–32)
CREAT SERPL-MCNC: 0.62 MG/DL (ref 0.66–1.25)
EOSINOPHIL # BLD AUTO: 0 10E3/UL (ref 0–0.7)
EOSINOPHIL NFR BLD AUTO: 0 %
ERYTHROCYTE [DISTWIDTH] IN BLOOD BY AUTOMATED COUNT: 12.9 % (ref 10–15)
GFR SERPL CREATININE-BSD FRML MDRD: >90 ML/MIN/1.73M2
GLUCOSE BLD-MCNC: 124 MG/DL (ref 70–99)
GLUCOSE BLDC GLUCOMTR-MCNC: 124 MG/DL (ref 70–99)
HCT VFR BLD AUTO: 39.5 % (ref 40–53)
HGB BLD-MCNC: 13.2 G/DL (ref 13.3–17.7)
IMM GRANULOCYTES # BLD: 0.1 10E3/UL
IMM GRANULOCYTES NFR BLD: 1 %
LYMPHOCYTES # BLD AUTO: 1.1 10E3/UL (ref 0.8–5.3)
LYMPHOCYTES NFR BLD AUTO: 9 %
MCH RBC QN AUTO: 29.5 PG (ref 26.5–33)
MCHC RBC AUTO-ENTMCNC: 33.4 G/DL (ref 31.5–36.5)
MCV RBC AUTO: 88 FL (ref 78–100)
MONOCYTES # BLD AUTO: 0.7 10E3/UL (ref 0–1.3)
MONOCYTES NFR BLD AUTO: 5 %
NEUTROPHILS # BLD AUTO: 10.3 10E3/UL (ref 1.6–8.3)
NEUTROPHILS NFR BLD AUTO: 85 %
NRBC # BLD AUTO: 0 10E3/UL
NRBC BLD AUTO-RTO: 0 /100
PLAT MORPH BLD: NORMAL
PLATELET # BLD AUTO: 134 10E3/UL (ref 150–450)
POTASSIUM BLD-SCNC: 4.5 MMOL/L (ref 3.4–5.3)
PROCALCITONIN SERPL-MCNC: <0.05 NG/ML
RBC # BLD AUTO: 4.47 10E6/UL (ref 4.4–5.9)
RBC MORPH BLD: NORMAL
SODIUM SERPL-SCNC: 131 MMOL/L (ref 133–144)
SODIUM SERPL-SCNC: 131 MMOL/L (ref 133–144)
SODIUM SERPL-SCNC: 133 MMOL/L (ref 133–144)
WBC # BLD AUTO: 12.2 10E3/UL (ref 4–11)

## 2022-05-11 PROCEDURE — 94640 AIRWAY INHALATION TREATMENT: CPT | Mod: 76

## 2022-05-11 PROCEDURE — 36415 COLL VENOUS BLD VENIPUNCTURE: CPT | Performed by: HOSPITALIST

## 2022-05-11 PROCEDURE — 999N000157 HC STATISTIC RCP TIME EA 10 MIN

## 2022-05-11 PROCEDURE — 71045 X-RAY EXAM CHEST 1 VIEW: CPT

## 2022-05-11 PROCEDURE — 120N000001 HC R&B MED SURG/OB

## 2022-05-11 PROCEDURE — 84295 ASSAY OF SERUM SODIUM: CPT | Performed by: HOSPITALIST

## 2022-05-11 PROCEDURE — 94640 AIRWAY INHALATION TREATMENT: CPT

## 2022-05-11 PROCEDURE — G0378 HOSPITAL OBSERVATION PER HR: HCPCS

## 2022-05-11 PROCEDURE — 82962 GLUCOSE BLOOD TEST: CPT

## 2022-05-11 PROCEDURE — 250N000011 HC RX IP 250 OP 636: Performed by: HOSPITALIST

## 2022-05-11 PROCEDURE — 85025 COMPLETE CBC W/AUTO DIFF WBC: CPT | Performed by: HOSPITALIST

## 2022-05-11 PROCEDURE — 250N000013 HC RX MED GY IP 250 OP 250 PS 637: Performed by: INTERNAL MEDICINE

## 2022-05-11 PROCEDURE — 99232 SBSQ HOSP IP/OBS MODERATE 35: CPT | Performed by: HOSPITALIST

## 2022-05-11 PROCEDURE — 250N000009 HC RX 250: Performed by: INTERNAL MEDICINE

## 2022-05-11 PROCEDURE — 84145 PROCALCITONIN (PCT): CPT | Performed by: HOSPITALIST

## 2022-05-11 PROCEDURE — 250N000013 HC RX MED GY IP 250 OP 250 PS 637: Performed by: HOSPITALIST

## 2022-05-11 PROCEDURE — 96376 TX/PRO/DX INJ SAME DRUG ADON: CPT

## 2022-05-11 RX ORDER — CALCIUM CARBONATE 1250 MG/5ML
1250 SUSPENSION ORAL 3 TIMES DAILY
Status: DISCONTINUED | OUTPATIENT
Start: 2022-05-11 | End: 2022-05-12 | Stop reason: HOSPADM

## 2022-05-11 RX ADMIN — BRIVARACETAM 100 MG: 10 SOLUTION ORAL at 09:59

## 2022-05-11 RX ADMIN — BRIVARACETAM 100 MG: 10 SOLUTION ORAL at 23:32

## 2022-05-11 RX ADMIN — METOCLOPRAMIDE HYDROCHLORIDE 10 MG: 5 SOLUTION ORAL at 16:44

## 2022-05-11 RX ADMIN — CALCIUM CARBONATE 1250 MG: 1250 SUSPENSION ORAL at 11:44

## 2022-05-11 RX ADMIN — ALBUTEROL SULFATE 2.5 MG: 2.5 SOLUTION RESPIRATORY (INHALATION) at 14:55

## 2022-05-11 RX ADMIN — CARBAMAZEPINE 150 MG: 100 SUSPENSION ORAL at 06:08

## 2022-05-11 RX ADMIN — ALBUTEROL SULFATE 2.5 MG: 2.5 SOLUTION RESPIRATORY (INHALATION) at 12:25

## 2022-05-11 RX ADMIN — ACETYLCYSTEINE 2 ML: 200 SOLUTION ORAL; RESPIRATORY (INHALATION) at 14:54

## 2022-05-11 RX ADMIN — POTASSIUM & SODIUM PHOSPHATES POWDER PACK 280-160-250 MG 1 PACKET: 280-160-250 PACK at 13:55

## 2022-05-11 RX ADMIN — POTASSIUM & SODIUM PHOSPHATES POWDER PACK 280-160-250 MG 1 PACKET: 280-160-250 PACK at 23:32

## 2022-05-11 RX ADMIN — Medication 40 MG: at 09:13

## 2022-05-11 RX ADMIN — CARBAMAZEPINE 150 MG: 100 SUSPENSION ORAL at 00:07

## 2022-05-11 RX ADMIN — HYDROCORTISONE SODIUM SUCCINATE 50 MG: 100 INJECTION, POWDER, FOR SOLUTION INTRAMUSCULAR; INTRAVENOUS at 04:32

## 2022-05-11 RX ADMIN — CARBAMAZEPINE 150 MG: 100 SUSPENSION ORAL at 11:45

## 2022-05-11 RX ADMIN — LEVOTHYROXINE SODIUM 150 MCG: 75 TABLET ORAL at 09:00

## 2022-05-11 RX ADMIN — Medication 15 ML: at 09:14

## 2022-05-11 RX ADMIN — METOCLOPRAMIDE HYDROCHLORIDE 10 MG: 5 SOLUTION ORAL at 08:59

## 2022-05-11 RX ADMIN — ASPIRIN 81 MG CHEWABLE TABLET 81 MG: 81 TABLET CHEWABLE at 09:00

## 2022-05-11 RX ADMIN — OXYCODONE HYDROCHLORIDE AND ACETAMINOPHEN 1000 MG: 500 TABLET ORAL at 08:59

## 2022-05-11 RX ADMIN — HYDROCORTISONE SODIUM SUCCINATE 50 MG: 100 INJECTION, POWDER, FOR SOLUTION INTRAMUSCULAR; INTRAVENOUS at 23:57

## 2022-05-11 RX ADMIN — CALCIUM CARBONATE 1250 MG: 1250 SUSPENSION ORAL at 23:32

## 2022-05-11 RX ADMIN — CALCIUM CARBONATE 1250 MG: 1250 SUSPENSION ORAL at 13:55

## 2022-05-11 RX ADMIN — METOCLOPRAMIDE HYDROCHLORIDE 10 MG: 5 SOLUTION ORAL at 11:45

## 2022-05-11 RX ADMIN — POTASSIUM & SODIUM PHOSPHATES POWDER PACK 280-160-250 MG 1 PACKET: 280-160-250 PACK at 09:00

## 2022-05-11 RX ADMIN — SODIUM BICARBONATE 650 MG TABLET 650 MG: at 23:32

## 2022-05-11 RX ADMIN — CARBAMAZEPINE 100 MG: 100 SUSPENSION ORAL at 20:01

## 2022-05-11 RX ADMIN — SODIUM BICARBONATE 650 MG TABLET 650 MG: at 13:55

## 2022-05-11 RX ADMIN — ACETYLCYSTEINE 2 ML: 200 SOLUTION ORAL; RESPIRATORY (INHALATION) at 08:33

## 2022-05-11 RX ADMIN — ACETYLCYSTEINE 2 ML: 200 SOLUTION ORAL; RESPIRATORY (INHALATION) at 12:25

## 2022-05-11 RX ADMIN — ALBUTEROL SULFATE 2.5 MG: 2.5 SOLUTION RESPIRATORY (INHALATION) at 08:33

## 2022-05-11 RX ADMIN — SODIUM BICARBONATE 650 MG TABLET 650 MG: at 08:59

## 2022-05-11 RX ADMIN — HYDROCORTISONE SODIUM SUCCINATE 50 MG: 100 INJECTION, POWDER, FOR SOLUTION INTRAMUSCULAR; INTRAVENOUS at 11:52

## 2022-05-11 ASSESSMENT — ACTIVITIES OF DAILY LIVING (ADL)
ADLS_ACUITY_SCORE: 55
DEPENDENT_IADLS:: COOKING;CLEANING;LAUNDRY;SHOPPING;MEAL PREPARATION;MEDICATION MANAGEMENT;MONEY MANAGEMENT;TRANSPORTATION;INCONTINENCE
ADLS_ACUITY_SCORE: 55

## 2022-05-11 NOTE — CONSULTS
Care Management Initial Consult    General Information  Assessment completed with: Parents, Savannah  Type of CM/SW Visit: Initial Assessment    Primary Care Provider verified and updated as needed:     Readmission within the last 30 days:        Reason for Consult: discharge planning  Advance Care Planning: Advance Care Planning Reviewed: present on chart          Communication Assessment  Patient's communication style: spoken language (English or Bilingual)    Hearing Difficulty or Deaf: no        Cognitive  Cognitive/Neuro/Behavioral: .WDL except, all  Level of Consciousness: alert  Arousal Level: opens eyes spontaneously  Orientation: other (see comments) (britt nonverbal)  Mood/Behavior: calm  Best Language: 2 - Severe aphasia  Speech:  (nonverbal)    Living Environment:   People in home: parent(s)  Savannah  Current living Arrangements: house      Able to return to prior arrangements: yes       Family/Social Support:  Care provided by: parent(s), other (see comments)  Provides care for: no one, unable/limited ability to care for self  Marital Status: Single             Description of Support System: supportive          Current Resources:   Patient receiving home care services: No     Community Resources: County Worker, PCA  Equipment currently used at home: lift device  Supplies currently used at home: Nutritional Supplements, Incontinence Supplies    Employment/Financial:  Employment Status: disabled        Financial Concerns: No concerns identified           Lifestyle & Psychosocial Needs:  Social Determinants of Health     Tobacco Use: Medium Risk     Smoking Tobacco Use: Former Smoker     Smokeless Tobacco Use: Never Used   Alcohol Use: Not on file   Financial Resource Strain: Not on file   Food Insecurity: Not on file   Transportation Needs: Not on file   Physical Activity: Not on file   Stress: Not on file   Social Connections: Not on file   Intimate Partner Violence: Not on file   Depression: Not at risk      PHQ-2 Score: 0   Housing Stability: Not on file       Functional Status:  Prior to admission patient needed assistance:   Dependent ADLs:: Bathing, Dressing  Dependent IADLs:: Cooking, Cleaning, Laundry, Shopping, Meal Preparation, Medication Management, Money Management, Transportation, Incontinence  Assesssment of Functional Status: At functional baseline    Mental Health Status:  Mental Health Status: No Current Concerns       Chemical Dependency Status:      None noted          Values/Beliefs:  Spiritual, Cultural Beliefs, [employer]an Practices, Values that affect care: no               Additional Information:  Placed call to Guardian and mother Savannah. She is currently trying to get some times cards in as she and the other PCA have not been paid, she forgot to do them. Savannah told writer that patient has no Home Care, No Nurses, just her and another PCA, Keyon. Savannah will be in later after she resolves this issue.  Zee Stout RN

## 2022-05-11 NOTE — PLAN OF CARE
Orientation/Cognitive: Nonverbal, Britt orientation. Legal guardian. Mother states pt able to nod yes or no, and give thumbs up or down. Pt appeared to understand. Hx seizures. Aspiration risk, HOB up. Hx TBI.   Observation Goals (Met/ Not Met): Not met  Mobility Level/Assist Equipment: Repo, Lift  Fall Risk (Y/N): Y. Hemiplegia.   Behavior Concerns: N  Pain Management: britt pain  Tele/VS/O2: Tele NS. O2 desat at times, currently 2 L oxymask. Congested cough, pt unable to cough up sputum. Suction 1x and scheduled meds done by resp therapy. Chest xray done, rt desat, no acute disease. Possible problem with pulse ox.   ABNL Lab/BG: Na now 133, Na q 8 hours. Coag Neg bacteremia- staph capitis per provider note  Diet: NPO. G/J tube 60 mL/hr, flush 100 mL q4 hours. Powered off at 1910, out of formula, restart asap.  Bowel/Bladder: incont. External cath.   Skin Concerns: Mepilex coccyx, blanchable. G/J CDI  Drains/Devices: G/J tube  Tests/Procedures for next shift: Lab draws  Anticipated DC date & active delays: TBD  Patient Stated Goal for Today: britt

## 2022-05-11 NOTE — PROGRESS NOTES
Observation goals  PRIOR TO DISCHARGE        Comments:   -diagnostic tests and consults completed and resulted: not met  -vital signs normal or at patient baseline: not met, O2  Nurse to notify provider when observation goals have been met and patient is ready for discharge.

## 2022-05-11 NOTE — PLAN OF CARE
Goal Outcome Evaluation:      Orientation/Cognitive: A&O to self, nonverbal   Observation Goals (Met/ Not Met): Not met  Mobility Level/Assist Equipment: Ax2 lift, T/R q2h  Fall Risk (Y/N): Y  Behavior Concerns: NA  Pain Management: Denies pain   Tele/VS/O2: VSS on RA  ABNL Lab/BG: NA:128, [id number]  Diet: NPO, G/J tube infusing 60ml/hr free water 80ml q4h  Bowel/Bladder: Incontinent  Skin Concerns: Redness to coccyx, face rash   Drains/Devices: External catheter, PIV:S/L  Tests/Procedures for next shift: Sodium  checks q8h   Anticipated DC date & active delays: TBD pending improvement   Patient Stated Goal for Today: Unable to state goal           Observation goals  PRIOR TO DISCHARGE        Comments:   -diagnostic tests and consults completed and resulted: not met  -vital signs normal or at patient baseline: met   Nurse to notify provider when observation goals have been met and patient is ready for discharge.

## 2022-05-11 NOTE — UTILIZATION REVIEW
[facility]   Admission Status; Secondary Review Determination   Admission date:  5/9/2022 11:15 AM    Under the authority of the Utilization Management Committee, the utilization review process indicated a secondary review on the above patient. The review outcome is based on review of the medical records, discussions with staff, and applying clinical experience noted on the date of the review.     (x) Inpatient Status Appropriate - This patient's medical care is consistent with medical management for inpatient care and reasonable inpatient medical practice.     RATIONALE FOR DETERMINATION   61-year-old male with a history of TBI, hemiplegia, seizure disorder, recurrent aspiration pneumonia with chronic tube feeds was admitted on 5/9 with lethargy and generalized weakness.  Work-up has revealed hyponatremia.  He was started on IV fluids and salt tabs were increased.  He now has concerns for bacteremia with gram-positive cocci in his blood cultures.  He remains lethargic and weak.  This patient is complicated, has multiple ongoing issues, is high risk for clinical deterioration, requires ongoing work-up, and has already been hospitalized for greater than 2 midnights so per Medicare guidelines is appropriate for inpatient hospitalization at the time of this review.  Recommendation was paged to Dr Costa.  The severity of illness, intensity of service provided, expected LOS and risk for adverse outcome make the care complex, high risk and appropriate for hospital admission.    At the time of admission with the information available to the attending physician more than 2 nights Hospital complex care was anticipated, based on patient risk of adverse outcome if treated as outpatient and complex care required.  Inpatient admission is appropriate based on the Medicare guidelines.      The information on this document is developed by the utilization review team in order for the business office to ensure  compliance. This only denotes the appropriateness of proper admission status and does not reflect the quality of care rendered.   The definitions of Inpatient Status and Observation Status used in making the determination above are those provided in the CMS Coverage Manual, Chapter 1 and Chapter 6, section 70.4.     Sincerely,   Keyon Mac DO MPH   Physician Advisor  Utilization Review  [facility]

## 2022-05-11 NOTE — PROGRESS NOTES
[facility]  Medicine Progress Note - Hospitalist Service    Date of Admission:  5/9/2022    Assessment & Plan            Keyon Farias is a 61 year old male admitted on 5/9/2022. 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  Patient's work-up was negative in the emergency room except mild hyponatremia at 128.  Chest x-ray was negative for any pneumonia     Hyponatremia, possible SIADH  Patient has intermittently low sodium but was normal in December 2021.  On tube feeding, his mom reports Free water flushes and Pedialyte close to 2L in 24 hours.  Sodium 128 on admission  -Serum Osm low but Urine sodium high, urine osm high and sodium has trended down with NS indicating SIADH  -DC IV fluid  -Needs water restriction, patient is off IV hydration and so free water flush to continue at 100 cc every 4 hours  -Increased PTA salt tablets to 3 times daily for now, possibly BID at discharge.   - Follow sodium every 8 hours.    Coag Neg bacteremia- staph capitis  No source of infection but blood culture 1/2 grew gram-positive cocci in clusters.   -Initially unclear if this is contaminant versus true bacteremia and given patient's decreased level of consciousness/lethargy, could be infection as well and so repeat blood cultures x2 ordered and started on vancomycin.  - subsequently identified as CONS - staph capitis.  Antibiotic discontinued.  -Repeat culture remains negative  -Mild leukocytosis is likely related to steroid.    Generalized weakness  Lethargy/acute encephalopathy  Could be multifactorial.  Could be due to mild hyponatremia or from deconditioning    -Managing  hyponatremia as above     Addendum  Hypoxia  Reported by RN that patient became hypoxic, 88 on room air, noted upper airway gurgle, RT managed to suction oral secretions, patient was placed on oxygen mask 5 LPM.  Patient is known to have aspiration pneumonitis.  He is n.p.o. strictly  -Will get chest x-ray stat  -Wean O2 as able  -If worsening respiratory status, fever or elevated procalcitonin, will treat as aspiration pneumonia.  -NT suction as able    History of TBI    Right-sided spastic paresis,, aphasia and dysphagia;  -Continue tube feeds via GJ tube. His mom reports J tube for feeding, discussed with nursing.   -Continue his PTA meds to help with secretions  -Nutrition services consult to continue tube feeds     History of seizure disorder  Continue carbamazepine and Briviact     History of GERD  Continue Protonix    Panhypopituitarism   PTA Synthroid continued  -Receives testosterone injection weekly, continue when due  -PTA hydrocortisone changed to stress dose, reassess in 48 hours     Diet: NPO for Medical/Clinical Reasons Except for: No Exceptions  Adult Formula Drip Feeding: Continuous Jevity 1.5; Jejunostomy; Goal Rate: 60 mL/hr x 22 hrs; mL/hr; Medication - Feeding Tube Flush Frequency: At least 15-30 mL water before and after medication administration and with tube clogging; Amount to Se...    DVT Prophylaxis: Pneumatic Compression Devices  Lerma Catheter: Not present  Central Lines: None  Cardiac Monitoring: None  Code Status: Full Code      Disposition Plan   Expected Discharge: 05/11/2022     Anticipated discharge location: home with help/services    Delays:            The patient's care was discussed with the Bedside Nurse, Patient and Patient's Family 5/10, will update when available.    Rupali Costa MD  Hospitalist Service  [facility]  Securely message with the Vocera Web Console (learn more here)  Text page via [employer] Paging/Directory         Clinically  Significant Risk Factors Present on Admission         # Hyponatremia: Na = 131 mmol/L (Ref range: 133 - 144 mmol/L); 131 mmol/L (Ref range: 133 - 144 mmol/L) on admission, will monitor as appropriate        # Platelet Defect: home medication list includes an antiplatelet medication       ______________________________________________________________________    Interval History   Patient was evaluated this morning, appears more alert. Smiling. No acute issues reported by RN.   Afebrile, tolerating tube feeding  - Hyponatremia has slightly improved, 131 now.     Addendum:  Reported by RN that patient hypoxic to 88, upper airway gurgle needing suction by RT and now placed on 5LPM.     Data reviewed today: I reviewed all medications, new labs and imaging results over the last 24 hours. I personally reviewed no images or EKG's today.    Physical Exam   Vital Signs: Temp: 98.6  F (37  C) Temp src: Axillary BP: 125/66 Pulse: 96   Resp: 16 SpO2: 95 % (spo2 increased after neb and sx) O2 Device: None (Room air)    Weight: 150 lbs 3.2 oz    General Appearance: Fatigued and ill appearing, wakes up to voice and is slightly more alert today.  Patient has underlying aphasia, mostly nonverbal. At baseline responds by saying yes or no giving thumbs up per his mom.   HEENT: mucosa moist. Tracheostomy scar.  Respiratory: Clear to auscultation bilaterally, no crackles or wheezing heard on auscultation.    Cardiovascular:  S1, S2 regular.  No tachycardia or murmur.  GI: Soft, nontender, nondistended, no hepatosplenomegaly  Skin: Warm and dry  Musculoskeletal: contracted extremities  Neurologic: Chronic right-sided spastic hemiparesis noted    Data   Recent Labs   Lab 05/11/22  0703 05/11/22  0702 05/11/22  0650 05/10/22  2302 05/10/22  1204 05/10/22  0707 05/09/22  1556 05/09/22  1122   WBC 12.2*  --   --   --   --  6.5  --  10.3   HGB 13.2*  --   --   --   --  12.5*  --  14.6   MCV 88  --   --   --   --  88  --  85   *  --   --    --   --  144*  --  179   NA  --  131*  131*  --  128* 127* 127*  127*   < > 128*   POTASSIUM  --  4.5  --   --   --  4.2  --  4.2   CHLORIDE  --  100  --   --   --  97  --  96   CO2  --  28  --   --   --  25  --  27   BUN  --  8  --   --   --  10  --  13   CR  --  0.62*  --   --   --  0.56*  --  0.64*   ANIONGAP  --  3  --   --   --  5  --  5   STEVE  --  8.8  --   --   --  8.2*  --  8.5   GLC  --  124* 124*  --   --  131*   < > 87   ALBUMIN  --   --   --   --   --   --   --  3.3*   PROTTOTAL  --   --   --   --   --   --   --  7.6   BILITOTAL  --   --   --   --   --   --   --  0.5   ALKPHOS  --   --   --   --   --   --   --  126   ALT  --   --   --   --   --   --   --  22   AST  --   --   --   --   --   --   --  21    < > = values in this interval not displayed.     No results found for this or any previous visit (from the past 24 hour(s)).  Medications     dextrose         acetylcysteine  2 mL Nebulization 4x Daily     albuterol  2.5 mg Nebulization Q4H While awake     aspirin  81 mg Oral or Feeding Tube Daily     Brivaracetam  100 mg Oral or Feeding Tube BID     calcium carbonate  1,250 mg Oral or Feeding Tube TID     carBAMazepine  100 mg Oral Q24H     carBAMazepine  150 mg Oral or Feeding Tube TID     [Held by provider] hydrocortisone  15 mg Per Feeding Tube QAM AC     [Held by provider] hydrocortisone  5 mg Oral or Feeding Tube Daily     hydrocortisone sodium succinate PF  50 mg Intravenous BID     levothyroxine  150 mcg Per Feeding Tube Daily     metoclopramide  10 mg Per Feeding Tube 4x Daily AC & HS     multivitamins w/minerals  15 mL Oral or Feeding Tube Daily     pantoprazole  40 mg Per J Tube Daily     potassium & sodium phosphates  1 packet Oral or Feeding Tube TID     sodium bicarbonate  650 mg Per Feeding Tube TID     vitamin C  1,000 mg Oral or Feeding Tube Daily

## 2022-05-12 VITALS
WEIGHT: 150.2 LBS | DIASTOLIC BLOOD PRESSURE: 56 MMHG | OXYGEN SATURATION: 98 % | BODY MASS INDEX: 20.37 KG/M2 | TEMPERATURE: 98.3 F | SYSTOLIC BLOOD PRESSURE: 117 MMHG | RESPIRATION RATE: 18 BRPM | HEART RATE: 77 BPM

## 2022-05-12 LAB
ANION GAP SERPL CALCULATED.3IONS-SCNC: 5 MMOL/L (ref 3–14)
BACTERIA BLD CULT: ABNORMAL
BACTERIA BLD CULT: ABNORMAL
BASOPHILS # BLD AUTO: 0.1 10E3/UL (ref 0–0.2)
BASOPHILS NFR BLD AUTO: 1 %
BUN SERPL-MCNC: 10 MG/DL (ref 7–30)
CALCIUM SERPL-MCNC: 8.8 MG/DL (ref 8.5–10.1)
CHLORIDE BLD-SCNC: 102 MMOL/L (ref 94–109)
CO2 SERPL-SCNC: 30 MMOL/L (ref 20–32)
CREAT SERPL-MCNC: 0.74 MG/DL (ref 0.66–1.25)
EOSINOPHIL # BLD AUTO: 0.1 10E3/UL (ref 0–0.7)
EOSINOPHIL NFR BLD AUTO: 1 %
ERYTHROCYTE [DISTWIDTH] IN BLOOD BY AUTOMATED COUNT: 13.2 % (ref 10–15)
GFR SERPL CREATININE-BSD FRML MDRD: >90 ML/MIN/1.73M2
GLUCOSE BLD-MCNC: 142 MG/DL (ref 70–99)
HCT VFR BLD AUTO: 42.2 % (ref 40–53)
HGB BLD-MCNC: 13.7 G/DL (ref 13.3–17.7)
IMM GRANULOCYTES # BLD: 0 10E3/UL
IMM GRANULOCYTES NFR BLD: 0 %
LYMPHOCYTES # BLD AUTO: 1.9 10E3/UL (ref 0.8–5.3)
LYMPHOCYTES NFR BLD AUTO: 24 %
MCH RBC QN AUTO: 29.6 PG (ref 26.5–33)
MCHC RBC AUTO-ENTMCNC: 32.5 G/DL (ref 31.5–36.5)
MCV RBC AUTO: 91 FL (ref 78–100)
MONOCYTES # BLD AUTO: 0.7 10E3/UL (ref 0–1.3)
MONOCYTES NFR BLD AUTO: 9 %
NEUTROPHILS # BLD AUTO: 5.2 10E3/UL (ref 1.6–8.3)
NEUTROPHILS NFR BLD AUTO: 65 %
NRBC # BLD AUTO: 0 10E3/UL
NRBC BLD AUTO-RTO: 0 /100
PLATELET # BLD AUTO: 199 10E3/UL (ref 150–450)
POTASSIUM BLD-SCNC: 4 MMOL/L (ref 3.4–5.3)
RBC # BLD AUTO: 4.63 10E6/UL (ref 4.4–5.9)
SODIUM SERPL-SCNC: 137 MMOL/L (ref 133–144)
SODIUM SERPL-SCNC: 138 MMOL/L (ref 133–144)
WBC # BLD AUTO: 7.9 10E3/UL (ref 4–11)

## 2022-05-12 PROCEDURE — 250N000013 HC RX MED GY IP 250 OP 250 PS 637: Performed by: INTERNAL MEDICINE

## 2022-05-12 PROCEDURE — 85025 COMPLETE CBC W/AUTO DIFF WBC: CPT | Performed by: HOSPITALIST

## 2022-05-12 PROCEDURE — 36415 COLL VENOUS BLD VENIPUNCTURE: CPT | Performed by: HOSPITALIST

## 2022-05-12 PROCEDURE — 94640 AIRWAY INHALATION TREATMENT: CPT

## 2022-05-12 PROCEDURE — 80048 BASIC METABOLIC PNL TOTAL CA: CPT | Performed by: HOSPITALIST

## 2022-05-12 PROCEDURE — 999N000157 HC STATISTIC RCP TIME EA 10 MIN

## 2022-05-12 PROCEDURE — 94640 AIRWAY INHALATION TREATMENT: CPT | Mod: 76

## 2022-05-12 PROCEDURE — 250N000009 HC RX 250: Performed by: INTERNAL MEDICINE

## 2022-05-12 PROCEDURE — 99239 HOSP IP/OBS DSCHRG MGMT >30: CPT | Performed by: HOSPITALIST

## 2022-05-12 PROCEDURE — 250N000013 HC RX MED GY IP 250 OP 250 PS 637: Performed by: HOSPITALIST

## 2022-05-12 RX ORDER — SODIUM BICARBONATE 650 MG/1
650 TABLET ORAL 2 TIMES DAILY
Qty: 30 TABLET | Refills: 0 | Status: SHIPPED | OUTPATIENT
Start: 2022-05-12 | End: 2022-11-20

## 2022-05-12 RX ORDER — SODIUM BICARBONATE 650 MG/1
650 TABLET ORAL 2 TIMES DAILY
Status: DISCONTINUED | OUTPATIENT
Start: 2022-05-12 | End: 2022-05-12

## 2022-05-12 RX ORDER — SODIUM BICARBONATE 650 MG/1
650 TABLET ORAL 2 TIMES DAILY
Status: DISCONTINUED | OUTPATIENT
Start: 2022-05-12 | End: 2022-05-12 | Stop reason: HOSPADM

## 2022-05-12 RX ADMIN — ALBUTEROL SULFATE 2.5 MG: 2.5 SOLUTION RESPIRATORY (INHALATION) at 00:17

## 2022-05-12 RX ADMIN — ALBUTEROL SULFATE 2.5 MG: 2.5 SOLUTION RESPIRATORY (INHALATION) at 11:00

## 2022-05-12 RX ADMIN — CARBAMAZEPINE 150 MG: 100 SUSPENSION ORAL at 06:56

## 2022-05-12 RX ADMIN — METOCLOPRAMIDE HYDROCHLORIDE 10 MG: 5 SOLUTION ORAL at 09:07

## 2022-05-12 RX ADMIN — SODIUM BICARBONATE 650 MG TABLET 650 MG: at 12:54

## 2022-05-12 RX ADMIN — CARBAMAZEPINE 150 MG: 100 SUSPENSION ORAL at 12:54

## 2022-05-12 RX ADMIN — CALCIUM CARBONATE 1250 MG: 1250 SUSPENSION ORAL at 09:11

## 2022-05-12 RX ADMIN — LEVOTHYROXINE SODIUM 150 MCG: 75 TABLET ORAL at 09:07

## 2022-05-12 RX ADMIN — OXYCODONE HYDROCHLORIDE AND ACETAMINOPHEN 1000 MG: 500 TABLET ORAL at 09:07

## 2022-05-12 RX ADMIN — POTASSIUM & SODIUM PHOSPHATES POWDER PACK 280-160-250 MG 1 PACKET: 280-160-250 PACK at 09:07

## 2022-05-12 RX ADMIN — ACETYLCYSTEINE 2 ML: 200 SOLUTION ORAL; RESPIRATORY (INHALATION) at 08:08

## 2022-05-12 RX ADMIN — ALBUTEROL SULFATE 2.5 MG: 2.5 SOLUTION RESPIRATORY (INHALATION) at 15:10

## 2022-05-12 RX ADMIN — ALBUTEROL SULFATE 2.5 MG: 2.5 SOLUTION RESPIRATORY (INHALATION) at 08:07

## 2022-05-12 RX ADMIN — HYDROCORTISONE 15 MG: 10 TABLET ORAL at 06:59

## 2022-05-12 RX ADMIN — Medication 15 ML: at 09:12

## 2022-05-12 RX ADMIN — CALCIUM CARBONATE 1250 MG: 1250 SUSPENSION ORAL at 13:59

## 2022-05-12 RX ADMIN — BRIVARACETAM 100 MG: 10 SOLUTION ORAL at 09:12

## 2022-05-12 RX ADMIN — ACETYLCYSTEINE 2 ML: 200 SOLUTION ORAL; RESPIRATORY (INHALATION) at 11:00

## 2022-05-12 RX ADMIN — ACETYLCYSTEINE 2 ML: 200 SOLUTION ORAL; RESPIRATORY (INHALATION) at 15:10

## 2022-05-12 RX ADMIN — ASPIRIN 81 MG CHEWABLE TABLET 81 MG: 81 TABLET CHEWABLE at 09:07

## 2022-05-12 RX ADMIN — Medication 40 MG: at 09:12

## 2022-05-12 RX ADMIN — ACETYLCYSTEINE 2 ML: 200 SOLUTION ORAL; RESPIRATORY (INHALATION) at 00:17

## 2022-05-12 RX ADMIN — POTASSIUM & SODIUM PHOSPHATES POWDER PACK 280-160-250 MG 1 PACKET: 280-160-250 PACK at 13:59

## 2022-05-12 RX ADMIN — METOCLOPRAMIDE HYDROCHLORIDE 10 MG: 5 SOLUTION ORAL at 12:54

## 2022-05-12 RX ADMIN — HYDROCORTISONE 5 MG: 5 TABLET ORAL at 13:59

## 2022-05-12 ASSESSMENT — ACTIVITIES OF DAILY LIVING (ADL)
ADLS_ACUITY_SCORE: 55
ADLS_ACUITY_SCORE: 51
ADLS_ACUITY_SCORE: 55
ADLS_ACUITY_SCORE: 51
ADLS_ACUITY_SCORE: 55
ADLS_ACUITY_SCORE: 55

## 2022-05-12 NOTE — PROGRESS NOTES
Pt transferred to [id number] with all belongings and meds. Briviact medication delivered by pharmacy - hand delivered to receiving RN.

## 2022-05-12 NOTE — PROGRESS NOTES
CLINICAL NUTRITION SERVICES  -  ASSESSMENT NOTE    Recommendations Ordered by Registered Dietitian (RD):   - Continue orders as entered.    Malnutrition:   % Weight Loss:  Up to 7.5% in 3 months (moderate malnutrition)  % Intake:  No decreased intake noted - Not able to fully assess, lack of nutr hx.   Subcutaneous Fat Loss:  Orbital region moderate depletion- baseline  Muscle Loss:  Temporal region moderate depletion and Clavicle bone region moderate depletion - baseline  Fluid Retention:  None noted    Malnutrition Diagnosis: Unable to determine due to lack of information. Will continue to monitor weight trending to confirm weight loss.      REASON FOR ASSESSMENT  Keyon Farias is a 61 year old male seen by Registered Dietitian for Registered Dietitian to order TF per Medical Nutrition Therapy Guidelines (received 5/9 - review brief note from that date given pt was OBSERVATION status until yesterday).     NUTRITION HISTORY  - Information obtained from Chart Review. Patient is wellknown to clinical nutrition services. Last here in Dec 2021.     Jevity 1.5 at 55 mL/hr = 1980 kcals, 84 gm pro, 1003 mL H20, 284 gm CHO, 27 gm fiber  Free H20 200 mL every 4 hrs (1200 mL)  Total Fluid (TF + flushes):  2203 mL (29 mL/kg)    CURRENT NUTRITION ORDERS  Diet Order:       Via Gastrojejunostomy-->   Jevity 1.5 at 60 mL/hr x 22 hours per day (hold TF 1 hour before and 1 hour after Synthroid administration) - 1980 kcal (29 kcal/kg), 84 g protein (1.2 g/kg), 1003 mL H2O, 284 g CHO, 27 g fiber   Flushes 120 mL every 4 hours while on IVF    Current Intake/Tolerance:  - Tolerating at goal.   - Stooling:   Last BM x1 on 5/10  - Labs:   No labs today.   5/9 - Phos was 2.3 (L) --> Takes NeutraPhos TID at baseline.  - Weight:   5/11 --> 68.1 kg   - Meds:   Calcium carbonate TID  Reglan 4x daily   Liquid multivitamin  NeutraPhos TID   SodiumBicarb - on hold  Vitamin D 1000 mg daily  Synthroid @ 0800    NUTRITION FOCUSED PHYSICAL ASSESSMENT  FOR DIAGNOSING MALNUTRITION)  Yes: Baseline fat/musles losses appreciated. Difficult to differentiate chronic from acute changes.     ANTHROPOMETRICS  Height: 6'  Weight: 150 lbs 3.2 oz (68.1 kg)   Body mass index is 20.37 kg/m .  Weight Status:  Normal BMI  IBW: 80.9 kg   % IBW: 84%  Weight History: Appears he last weighed in the 150s in 2020. Wt down a bit, suggestive of inadequate feeding? Will continue to evaluate weight trending. 6.3% weight loss indicated in 2 months.   Wt Readings from Last 10 Encounters:   05/11/22 68.1 kg (150 lb 3.2 oz)   03/18/22 72.6 kg (160 lb)   12/11/21 75.2 kg (165 lb 12.6 oz)   11/17/21 73.6 kg (162 lb 3.2 oz)   10/30/21 75.2 kg (165 lb 12.6 oz)   08/10/21 78.8 kg (173 lb 11.6 oz)   05/24/21 80.1 kg (176 lb 8 oz)   04/16/21 76 kg (167 lb 8.8 oz)   02/22/21 74.8 kg (165 lb)   01/18/21 74.2 kg (163 lb 9.3 oz)       ASSESSED NUTRITION NEEDS PER APPROVED PRACTICE GUIDELINES:  Dosing Weight 68.1 kg   Estimated Energy Needs: 4287-2796 kcals (25-30 Kcal/Kg)  Justification: maintenance  Estimated Protein Needs:  grams protein (1.2-1.5 g pro/Kg)  Justification: maintenance  Estimated Fluid Needs: 1 mL/kcal   Justification: maintenance    MALNUTRITION:  % Weight Loss:  Up to 7.5% in 3 months (moderate malnutrition)  % Intake:  No decreased intake noted - Not able to fully assess, lack of nutr hx.   Subcutaneous Fat Loss:  Orbital region moderate depletion- baseline  Muscle Loss:  Temporal region moderate depletion and Clavicle bone region moderate depletion - baseline  Fluid Retention:  None noted    Malnutrition Diagnosis: Unable to determine due to lack of information. Will continue to monitor weight trending to confirm weight loss.     NUTRITION DIAGNOSIS:  Unintended weight loss related to possibility of inadequate enteral infusion as evidenced by weight loss of 10# according to records.       NUTRITION INTERVENTIONS  Recommendations / Nutrition Prescription  Continue TF and flushes  as ordered. 100% goal volumes should prevent further wt loss.       Implementation  Nutrition education: Per Provider order if indicated       Nutrition Goals  TF @ goal to provide % est needs.       MONITORING AND EVALUATION:  Progress towards goals will be monitored and evaluated per protocol and Practice Guidelines    Marisel Fernández RD, LD  [facility] [place], [facility], Ortho, [facility]  Pager: [phone]  Weekend Pager: [phone]

## 2022-05-12 NOTE — DISCHARGE SUMMARY
[facility]  Hospitalist Discharge Summary      Date of Admission:  5/9/2022  Date of Discharge:  5/12/2022  Discharging Provider: Rupali Costa MD  Discharge Service: Hospitalist Service    Discharge Diagnoses       Hyponatremia, due to SIADH    Coag neg bacteremia- contamination    Acute encephalopathy due to hyponatremia    Transient hypoxia    History of TBI     Right-sided spastic paresis,, aphasia and dysphagia    Seizure disorder    GERD    Panhypopituitary      Follow-ups Needed After Discharge   Follow-up Appointments     Follow-up and recommended labs and tests       Follow up with primary care provider, Carlos Gomez, within 7 days to   evaluate medication change, for hospital follow- up, and to check labs   (follow up sodium).  The following labs/tests are recommended: BMP in a   week.               Unresulted Labs Ordered in the Past 30 Days of this Admission     Date and Time Order Name Status Description    5/10/2022 10:38 AM Blood Culture Arm, Left Preliminary     5/10/2022 10:38 AM Blood Culture Arm, Right Preliminary     5/9/2022 11:08 AM Blood Culture Peripheral Blood Preliminary       These results will be followed up by hospitalist and PCP    Discharge Disposition   Discharged to home  Condition at discharge: Stable       Hospital Course          Keyon Farias is a 61 year old male admitted on 5/9/2022. 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  Patient's work-up was negative in the emergency room except mild hyponatremia at 128.  Chest x-ray was negative for any pneumonia     Hyponatremia, possible SIADH  Patient has intermittently low  sodium but was normal in December 2021.  On tube feeding, his mom reports Free water flushes and Pedialyte close to 2L in 24 hours.  Sodium 128 on admission  -Serum Osm low but Urine sodium high, urine osm high and sodium has trended down with NS indicating SIADH.  At that point, normal saline IV infusion was discontinued and managed with decreased free water flush and increased sodium bicarb tablets.  -Sodium gradually improved and now normal  -Patient's mentation also improved and at his baseline.    At discharge, PTA sodium bicarb tablet was increased from daily to twice daily.  Also free water flush decreased overall to 120 cc every 4 hours along with small 30-60 ml flushed with meds.  Patient's mom is his caregiver, she was using close to 2 L fluid through the feeding tube.  Discussed with dietitian.  Daily requirement including tube feeding volume is close to 2L.  She was educated on this.    Needs BMP in 1 week to follow up on sodium and creatinine.    Coag Neg bacteremia- staph capitis  No source of infection but blood culture 1/2 grew gram-positive cocci in clusters.   -Initially unclear if this is contaminant versus true bacteremia and given patient's decreased level of consciousness/lethargy, could be infection as well and so repeat blood cultures x2 ordered and started on vancomycin.  - subsequently identified as CONS - staph capitis.  Antibiotic discontinued.  -Repeat culture remains negative- follow till final  -Mild leukocytosis is likely related to steroid, resolved.    Generalized weakness  Lethargy/acute encephalopathy  Could be multifactorial.  Could be due to mild hyponatremia or from deconditioning    -Managing hyponatremia as above     Hypoxia  Reported by RN that patient became hypoxic, 88 on room air, noted upper airway gurgle, RT managed to suction oral secretions, patient was placed on oxygen mask 5 LPM.  Patient is known to have aspiration pneumonitis.  He is n.p.o. strictly  - chest x-ray  stat obtained, without acute process  -Suction by RN  -Subsequently hypoxia resolved.  This could be false reading from his pulse ox.     History of TBI    Right-sided spastic paresis,, aphasia and dysphagia;  -Continue tube feeds via GJ tube. His mom reports J tube for feeding, discussed with nursing.   -Continue his PTA meds to help with secretions  -Nutrition services consult to continue tube feeds  -FWF changed as above     History of seizure disorder  -Continued carbamazepine and Briviact     History of GERD  -Continued Protonix    Panhypopituitarism   PTA Synthroid continued  -Receives testosterone injection weekly, continue when due  -PTA hydrocortisone changed to stress dose, now back on PTA dose    Consultations This Hospital Stay   NUTRITION SERVICES ADULT IP CONSULT  PHARMACY IP CONSULT  PHARMACY TO DOSE VANCO  CARE MANAGEMENT / SOCIAL WORK IP CONSULT  PHYSICAL THERAPY ADULT IP CONSULT    Code Status   Full Code    Time Spent on this Encounter   IRupali MD, personally saw the patient today and spent greater than 30 minutes discharging this patient.       Rupali Costa MD  [facility]  [address] [place] [address]  Phone: [phone]  Fax: [phone]  ______________________________________________________________________    Physical Exam   Vital Signs: Temp: 97.6  F (36.4  C) Temp src: Axillary BP: 122/69 Pulse: 74   Resp: 18 SpO2: 96 % O2 Device: None (Room air) Oxygen Delivery: 3 LPM  Weight: 150 lbs 3.2 oz    General Appearance: Fatigued and ill appearing, much more alert today.  Aphasic but giving thumbs up and smiling today   HEENT: mucosa moist. Tracheostomy scar.  Respiratory: Clear to auscultation bilaterally, no crackles or wheezing heard on auscultation.    Cardiovascular:  S1, S2 regular.  No tachycardia or murmur.  GI: Soft, nontender, nondistended, no hepatosplenomegaly  Skin: Warm and dry  Musculoskeletal: contracted  extremities  Neurologic: Chronic right-sided spastic hemiparesis noted       Primary Care Physician   Carlos Gomez    Discharge Orders      Medication Therapy Management Referral      Reason for your hospital stay    Acute hyponatremia and lethargy/encephaloapthy     Follow-up and recommended labs and tests     Follow up with primary care provider, Carlos Gomez, within 7 days to evaluate medication change, for hospital follow- up, and to check labs (follow up sodium).  The following labs/tests are recommended: BMP in a week.     Activity    Your activity upon discharge: activity as tolerated     Diet    Follow this diet upon discharge:  Adult Formula Drip Feeding: Continuous Jevity 1.5; Jejunostomy; Goal Rate: 60 mL/hr x 22 hrs; mL/hr; Medication - Feeding Tube Flush Frequency: 120 ml every 4 hours and also At least 15-30 mL water before and after medication administration and with tube clogging.      NPO for Medical/Clinical Reasons Except for: No Exceptions       Significant Results and Procedures   Most Recent 2 LFT's:Recent Labs   Lab Test 05/09/22  1122 12/27/21  1630   AST 21 21   ALT 22 28   ALKPHOS 126 115   BILITOTAL 0.5 0.3     Most Recent 6 Bacteria Isolates From Any Culture (See EPIC Reports for Culture Details):Recent Labs   Lab Test 05/23/21  0316 05/23/21  0250 04/15/21  2250 02/22/21  1622 02/22/21  1439 02/22/21  1413   CULT No growth No growth No growth No growth No growth No growth     Most Recent TSH and T4:Recent Labs   Lab Test 05/09/22  1122   TSH <0.01*   T4 1.42     Most Recent 6 glucoses:Recent Labs   Lab Test 05/12/22  0941 05/11/22  0702 05/11/22  0650 05/10/22  0707 05/09/22  1556 05/09/22  1122   * 124* 124* 131* 86 87     Most Recent Urinalysis:Recent Labs   Lab Test 05/09/22  1316 12/26/17  1105 10/03/17  2224   COLOR Yellow   < > Yellow   APPEARANCE Slightly Cloudy*   < > Clear   URINEGLC Negative   < > Negative   URINEBILI Negative   < > Negative   URINEKETONE Negative   <  > Negative   SG 1.024   < > 1.015   UBLD Negative   < > Negative   URINEPH 8.0*   < > 7.5*   PROTEIN 20 *   < > 30*   UROBILINOGEN  --   --  0.2   NITRITE Negative   < > Negative   LEUKEST Negative   < > Negative   RBCU 1   < > O - 2   WBCU 0   < > O - 2    < > = values in this interval not displayed.   ,   Results for orders placed or performed during the hospital encounter of 05/09/22   XR Chest Port 1 View    Narrative    CHEST ONE VIEW  5/9/2022 11:49 AM     HISTORY: Shortness of breath, cough.    COMPARISON: 12/27/2021.       Impression    IMPRESSION: No acute disease.     BERT ESPINOZA MD         SYSTEM ID:  [id number]   XR Chest Port 1 View    Narrative    CHEST ONE VIEW  5/11/2022 2:51 PM     HISTORY: Hypoxia, risk of aspiration.    COMPARISON: May 9, 2022      Impression    IMPRESSION: No acute disease.    BERT ESPINOZA MD         SYSTEM ID:  [id number]       Discharge Medications   Current Discharge Medication List      CONTINUE these medications which have CHANGED    Details   sodium bicarbonate 650 MG tablet Take 1 tablet (650 mg) by mouth 2 times daily  Qty: 30 tablet, Refills: 0    Associated Diagnoses: Hyponatremia         CONTINUE these medications which have NOT CHANGED    Details   acetaminophen (TYLENOL) 650 MG CR tablet Take 650 mg by mouth every 8 hours as needed for mild pain or fever      acetylcysteine (MUCOMYST) 20 % neb solution Take 2 mLs by nebulization 4 times daily With albuterol at 0700, 1100, 1500, and 1900      albuterol (PROVENTIL) (5 MG/ML) 0.5% neb solution Take 2.5 mg by nebulization every 4 hours (while awake) 0700 1100 1500 1900 with mucomyst      aspirin (ASA) 81 MG chewable tablet 81 mg by Oral or Feeding Tube route daily At 0900      bacitracin ointment Apply topically daily as needed for wound care To PEG site.      Brivaracetam (BRIVIACT) 10 MG/ML solution 100 mg by Oral or Feeding Tube route 2 times daily 0900, 2100      calcium carbonate 1250 MG/5ML SUSP suspension Take  1,250 mg by mouth 3 times daily 0900, 1500, 2100      !! carBAMazepine (TEGRETOL) 100 MG/5ML suspension Take 100 mg by mouth daily Take at 1800      !! carBAMazepine (TEGRETOL) 100 MG/5ML suspension 150 mg by Oral or Feeding Tube route 3 times daily At 06:00, 12:00, and 24:00 for seizures      hydrocortisone (CORTAID) 1 % external cream Apply topically 2 times daily as needed Apply to reddened memo areas as needed      hydrocortisone (CORTEF) 5 MG tablet Take 15 mg (3 tablets) in the morning and 5 mg (1 tablet)  at 2:00 PM. During illness patient takes more as a stress dose. Please increase the dose as directed.  Qty: 400 tablet, Refills: 3    Associated Diagnoses: Secondary adrenal insufficiency (H)      levothyroxine (SYNTHROID/LEVOTHROID) 150 MCG tablet Take 1 tablet (150 mcg) by mouth daily  Qty: 90 tablet, Refills: 3    Associated Diagnoses: Secondary hypothyroidism      metoclopramide (REGLAN) 10 MG/10ML SOLN solution Take 10 mg by mouth 4 times daily (before meals and nightly) 0800, 1200, 1600, 2000  Disconnects bag before administration, then waits 45 mins before reconnecting after giving the medication      multivitamin, therapeutic (THERA-VIT) TABS tablet Take 1 tablet by mouth daily      mupirocin (BACTROBAN) 2 % external ointment Apply topically 2 times daily as needed       pantoprazole (PROTONIX) 2 mg/mL SUSP suspension 20 mLs (40 mg) by Per J Tube route daily  Qty: 400 mL, Refills: 0    Comments: Future refills by PCP Dr. Carlos Gomez with phone number [phone].  Associated Diagnoses: Gastroesophageal reflux disease, esophagitis presence not specified      potassium & sodium phosphates (NEUTRA-PHOS) 280-160-250 MG Packet Take 1 packet by mouth 3 times daily Takes at 0800, 1500, and 2100      Scopolamine [facility] POWD Dispense #90. Mix contents with small amount of water for admin via J-tube.  Administer 0.8 mg three times each day.  Qty: 450 g, Refills: 1    Associated Diagnoses: Aspiration pneumonia  of both lungs due to gastric secretions, unspecified part of lung (H)      Skin Protectants, Misc. (BALMEX SKIN PROTECTANT) OINT Externally apply topically 2 times daily as needed (irritation) Applay to reddened memo areas twice daily as needed      testosterone cypionate (DEPOTESTOSTERONE) 200 MG/ML injection Inject 0.3 mLs (60 mg) into the muscle every 7 days New dose  Qty: 4 mL, Refills: 1    Associated Diagnoses: Panhypopituitarism (H); Hypogonadism male      vitamin C (ASCORBIC ACID) 1000 MG TABS 1,000 mg by Oral or Feeding Tube route daily       vitamin D3 (CHOLECALCIFEROL) 2000 units (50 mcg) tablet Take 2,000 Units by mouth daily Crush and feed via j-tube @@ 0900       !! - Potential duplicate medications found. Please discuss with provider.        Allergies   Allergies   Allergen Reactions     Valproic Acid Other (See Comments)     Toxicity w/ bone marrow suspension, elevated ammonia levels      Dilantin [Phenytoin Sodium] Other (See Comments)     Severe Trembling     Scopolamine Hives     Hives with the patch - oral no problem

## 2022-05-12 NOTE — DISCHARGE INSTRUCTIONS
Nutrition Discharge Instructions -   Continue Jevity 1.5 @ 55 mL/hr to provide 1980 kcals, 84 gm pro, 1003 mL H20, 284 gm CHO, 27 gm fiber  Recommend 160 mL flushes 6x daily to provide 1 mL/kcal.

## 2022-05-12 NOTE — PLAN OF CARE
Goal Outcome Evaluation:    Plan of Care Reviewed With: patient     Overall Patient Progress: no change    Discharge today to Home @16:30.  Mother(guardian) is caregiver.    Pt is alert, A&O x0. Unable to verbalize.  Gives thumbs up/down to communicate.   CMS HECTOR.   VSS on RA.   Has continuous feeding via GJ tube w/ free water 120mL Q4H.  Utilizes suction for oral secretions.  No cough this shift.  Up with Ax2 w/ Ceiling Lift for Transfers.  Chairfast.   Turn/repo Q2H.  Mepilex on sacral/coccyx d/t red/blanchable area.  Voiding via external male catheter.   No Pain visualized using body cues.   Continue to monitor.

## 2022-05-12 NOTE — PROGRESS NOTES
Patient arrived to unit at 2035. Due to generalized weakness and congested cough. Patient suctioned x2 during the shift. Patient alert and disoriented x4. Able to shows thumb up for yes and thumb down for no. Patient G tube is intact and running as order. Mapilex dressing changed to coccyx, Blanchable redness noted to coccyx and sacrum area. Patient was turned and reposition Q2hrs for comfort. Denied pain during the shift. On room air with oxygen level between 95% and 98%. Void via condom catheter.

## 2022-05-12 NOTE — PROGRESS NOTES
Care Management Discharge Note    Discharge Date: 05/13/2022       Discharge Disposition:      Discharge Services:      Discharge DME:      Discharge Transportation: health plan transportation    Private pay costs discussed: transportation costs and insurance costs out of pocket expenses    PAS Confirmation Code:    Patient/family educated on Medicare website which has current facility and service quality ratings:      Education Provided on the Discharge Plan:    Persons Notified of Discharge Plans: Bedside RN/MD/Pt's guardian/mother  Patient/Family in Agreement with the Plan: yes    Handoff Referral Completed: no    Additional Information:  Following for discharge planning to home.  Per MD Pt is ready to discharge today.    CC called Pt's mom/ke regarding discharge plan.  She would prefer transportation be set up for late afternoon.  She note Pt is currently not open to [facility].  Would like Home RN to assess for nutrition/vitals.  MD paged for orders.   Pt/family was provided with the Medicare Compare list for Home Care.  Discussed associated Medicare star ratings to assist with choice for referrals/discharge planning Yes    Education was given to pt/family that star ratings are updated/maintained by Medicare and can be reviewed by visiting www.medicare.gov Yes    Would like referral sent to [facility]. Pt has been open to them before.  Referral sent via email.    [facility] Transportation Stretcher (due to TBI/Hemiplegia) set up for 1634.  PCS on chart/Faxed.     New meds filled here and sent with.   Mom would like instructions on Tube Feeding/water flushes on AVS.  Dietician paged and will add to AVS.     Follow up made with PCP- Follow up with primary care provider, Carlos Gomez, Wed. May 18th at 1pm (Video Visit) to evaluate medication change, for hospital follow- up and to follow up on pending labs.  Added to AVS.     Bedside RN updated and will review AVS.     Addendum 1350:  Accepted by [facility].  Information  added to AVS.         Alee Sousa RN

## 2022-05-13 ENCOUNTER — MEDICAL CORRESPONDENCE (OUTPATIENT)
Dept: FAMILY MEDICINE | Facility: CLINIC | Age: 62
End: 2022-05-13

## 2022-05-13 ENCOUNTER — PATIENT OUTREACH (OUTPATIENT)
Dept: CARE COORDINATION | Facility: CLINIC | Age: 62
End: 2022-05-13
Payer: MEDICARE

## 2022-05-13 DIAGNOSIS — Z71.89 OTHER SPECIFIED COUNSELING: ICD-10-CM

## 2022-05-13 NOTE — PROGRESS NOTES
Clinic Care Coordination Contact  [facility]/Voicemail       Clinical Data: Care Coordinator Outreach  Outreach attempted x 1. Unable to leave a  message on patient's voicemail with call back information.    Plan:  Care Coordinator will try to reach patient again in 1-2 business days.    Golden Lopes  Community Health Worker  [facility]  Ph:[phone]

## 2022-05-14 LAB — BACTERIA BLD CULT: NO GROWTH

## 2022-05-14 NOTE — PROGRESS NOTES
Clinic Care Coordination Contact  [facility]/Voicemail       Clinical Data: Care Coordinator Outreach    Outreach attempted x 2.  Unable to leave message on patient'radhaan's voicemail with call back information, phone does not connect and just drops the call    Plan:  Care Coordinator will do no further outreaches at this time.    Sury Paez, [facility]  [phone]  [facility]

## 2022-05-15 LAB
BACTERIA BLD CULT: NO GROWTH
BACTERIA BLD CULT: NO GROWTH

## 2022-05-18 ENCOUNTER — TELEPHONE (OUTPATIENT)
Dept: PULMONOLOGY | Facility: CLINIC | Age: 62
End: 2022-05-18
Payer: MEDICARE

## 2022-05-18 NOTE — TELEPHONE ENCOUNTER
[facility] Call Center    Phone Message    May a detailed message be left on voicemail: yes     Reason for Call: Medication Refill Request    Has the patient contacted the pharmacy for the refill? Yes   Name of medication being requested: Scopolamine HBr POWD  Provider who prescribed the medication:    [facility] [address] [facility]  Date medication is needed: ASAP. Per mom pt is out and it helps pt every time he has flim in his throat. Per mom also wants to know if pt needs an appt with . Please call mom back.       Action Taken: Message routed to:  [facility] PULM    Travel Screening: Not Applicable

## 2022-05-19 ENCOUNTER — VIRTUAL VISIT (OUTPATIENT)
Dept: PHARMACY | Facility: CLINIC | Age: 62
End: 2022-05-19
Payer: MEDICAID

## 2022-05-19 ENCOUNTER — LAB REQUISITION (OUTPATIENT)
Dept: LAB | Facility: CLINIC | Age: 62
End: 2022-05-19
Payer: MEDICARE

## 2022-05-19 DIAGNOSIS — Z87.820 HISTORY OF TRAUMATIC BRAIN INJURY: ICD-10-CM

## 2022-05-19 DIAGNOSIS — K21.9 GASTROESOPHAGEAL REFLUX DISEASE, UNSPECIFIED WHETHER ESOPHAGITIS PRESENT: ICD-10-CM

## 2022-05-19 DIAGNOSIS — Z78.9 TAKES DIETARY SUPPLEMENTS: ICD-10-CM

## 2022-05-19 DIAGNOSIS — E87.1 HYPO-OSMOLALITY AND HYPONATREMIA: ICD-10-CM

## 2022-05-19 DIAGNOSIS — E03.9 HYPOTHYROIDISM, UNSPECIFIED TYPE: ICD-10-CM

## 2022-05-19 DIAGNOSIS — E87.1 HYPONATREMIA: Primary | ICD-10-CM

## 2022-05-19 DIAGNOSIS — E23.0 PANHYPOPITUITARISM (H): ICD-10-CM

## 2022-05-19 DIAGNOSIS — E29.1 HYPOGONADISM MALE: ICD-10-CM

## 2022-05-19 DIAGNOSIS — G40.909 RECURRENT SEIZURES (H): ICD-10-CM

## 2022-05-19 DIAGNOSIS — J96.01 ACUTE RESPIRATORY FAILURE WITH HYPOXIA (H): ICD-10-CM

## 2022-05-19 LAB
ANION GAP SERPL CALCULATED.3IONS-SCNC: 6 MMOL/L (ref 3–14)
BUN SERPL-MCNC: 17 MG/DL (ref 7–30)
CALCIUM SERPL-MCNC: 8.6 MG/DL (ref 8.5–10.1)
CHLORIDE BLD-SCNC: 99 MMOL/L (ref 94–109)
CO2 SERPL-SCNC: 27 MMOL/L (ref 20–32)
CREAT SERPL-MCNC: 0.86 MG/DL (ref 0.66–1.25)
GFR SERPL CREATININE-BSD FRML MDRD: >90 ML/MIN/1.73M2
GLUCOSE BLD-MCNC: 88 MG/DL (ref 70–99)
POTASSIUM BLD-SCNC: 3.9 MMOL/L (ref 3.4–5.3)
SODIUM SERPL-SCNC: 132 MMOL/L (ref 133–144)

## 2022-05-19 PROCEDURE — 80048 BASIC METABOLIC PNL TOTAL CA: CPT | Mod: ORL | Performed by: INTERNAL MEDICINE

## 2022-05-19 PROCEDURE — 99605 MTMS BY PHARM NP 15 MIN: CPT | Performed by: PHARMACIST

## 2022-05-19 PROCEDURE — 99607 MTMS BY PHARM ADDL 15 MIN: CPT | Performed by: PHARMACIST

## 2022-05-19 NOTE — PROGRESS NOTES
Medication Therapy Management (MTM) Encounter    ASSESSMENT:                            Medication Adherence/Access: No issues identified    Hyponatremia/Dysphagia: Improved per caregiver.    History of traumatic brain injury/Recurrent seizures: Stable per caregiver.     GERD: Stable.     Hypothyroidism: Stable. Last T4 is within normal limits.     Secondary adrenal insufficiency/Hypogonadism: Stable. Plan in place with endocrinology.    Supplements: Stable.     PLAN:                            1. Continue to administer Keyon's medications as prescribed and attend his upcoming follow-up appointments.     2. I have a colleague at the [facility] named Julia Charles, PharmD, BCACP who may be a good resource for Keyon in the future. I did reach out to her regarding new internal medicine provider if he transfers care to our primary care clinics.  She suggested Dr. Edita Pope as a possible provider to try to establish with due to her positive personality and strong clinical background. You can called the [facility] at [phone] to schedule an appointment.     Follow-up: As Needed    SUBJECTIVE/OBJECTIVE:                          Keyon Farias is a 61 year old male called for a transitions of care visit. He was discharged from [facility] on 5/12/2022 for hyponatremia. Joined for visit by mother/guardian Savannah - patient is non-verbal at baseline.    Reason for visit: Hospital Follow-Up.    Allergies/ADRs: Reviewed in chart  Past Medical History: Reviewed in chart  Tobacco: He reports that he quit smoking about 33 years ago. He has never used smokeless tobacco.  Alcohol: none      Medication Adherence/Access: mother helps to manage/administer all of his medications - she has symptoms in place to double/triple check herself. Denies any issues with missing doses.     Hyponatremia/Dysphagia: Quite alert - each day he's gotten more. Responding to mother - when sodium goes down it's  "\"like he stops thinking\". He has been taking sodium bicarbonate 650 mg twice daily and does take potassium/sodium phosphates packet three times daily (she has to pay out of pocket for these). Follows with Dr. Gomez out of the [facility] in [place], [place], but she will be leaving that practice soon and patient/mother plan [facility]. She is concerned about aspiration issues and did get a scopolamine powder refilled through his primary care. He is also receiving acetylcystine 20% nebs four times daily along with albuterol nebulizers.  Denies any known issues. Labs done by primary care provider. Sees pulmonology on 6/1/2022.     History of traumatic brain injury/Recurrent seizures: Patient is taking carbamazepine 150 mg three times daily (0600, 1200, 2400)/ carbamazepine 100 mg once daily at 1800 and brivaracetam 100 mg twice daily at 0900/2100. Denies any recent episodes.     GERD: Current medications include: Protonix (pantoprazole) 40 mg once daily and metoclopramide 10 mg four times daily - sometimes has to disconnect feeding if he is nauseous - did have one emesis since discharge. Pt reports no current symptoms.  Patient feels that current regimen is effective.    Hypothyroidism: Patient is taking levothyroxine 150 mcg daily. Patient is having the following symptoms: none.   TSH   Date Value Ref Range Status   05/09/2022 <0.01 (L) 0.40 - 4.00 mU/L Final   07/05/2018 <0.01 (L) 0.40 - 4.00 mU/L Final     T4 Free   Date Value Ref Range Status   04/22/2021 1.40 0.76 - 1.46 ng/dL Final     Free T4   Date Value Ref Range Status   05/09/2022 1.42 0.76 - 1.46 ng/dL Final       Secondary adrenal insufficiency/Hypogonadism: Patient is receiving hydrocortisone 15 mg every morning/5 mg every afternoon (if sick she will give 15 mg three times daily) and testosterone cypionate 60 mg every 7 days. Denies any known issues.   Lab Results   Component Value Date    TESTOSTTOTAL 558 " 04/15/2022       Supplements: Patient is receive vitamin C 1,000 mg daily, vitamin D3 2,000 units daily, a daily multivitamin, and calcium carbonate three times daily. Denies any current issues.     Today's Vitals: There were no vitals taken for this visit.   BP Readings from Last 3 Encounters:   05/12/22 117/56   03/18/22 110/60   12/28/21 99/55     Wt Readings from Last 5 Encounters:   05/11/22 150 lb 3.2 oz (68.1 kg)   03/18/22 160 lb (72.6 kg)   12/11/21 165 lb 12.6 oz (75.2 kg)   11/17/21 162 lb 3.2 oz (73.6 kg)   10/30/21 165 lb 12.6 oz (75.2 kg)     ----------------  Post Discharge Medication Reconciliation Status: discharge medications reconciled, continue medications without change.    I spent 30 minutes with this patient today. A copy of the visit note was provided to the patient's provider(s).    The patient was mailed a summary of these recommendations.     Naveed Ortiz, JustinD, BCACP  Medication Therapy Management Pharmacist  Pager: [phone]    Telemedicine Visit Details  Type of service:  Telephone visit  Start Time: 2:15 PM  End Time: 2:45 PM  Originating Location (patient location): [place]  Distant Location (provider location):  [facility]     Medication Therapy Recommendations  No medication therapy recommendations to display

## 2022-05-19 NOTE — LETTER
[address] [address] [phone]      May 20, 2022    Keyon Farias                                                                                                                     [address]  [facility] [address]      Dear Keyon,    It was so nice to speak with you on 5/19/2022.  I hope I was able to give you some useful information during our Medication Therapy Management (MTM) visit. The purpose of this visit with a clinical pharmacist was to review the medicines you received when discharged from the hospital. We want to make sure that you know which medicines to take and what they are for. We also want to make sure all your medicines are working, safe, and as easy to take as possible.    Based upon our conversation:    1. Continue to administer Keyon's medications as prescribed and attend his upcoming follow-up appointments.     2. I have a colleague at the [facility] named Julia Charles PharmD, STONEYCP who may be a good resource for Keyon in the future. I did reach out to her regarding new internal medicine provider if he transfers care to our primary care clinics.  She suggested Dr. Edita Pope as a possible provider to try to establish with due to her positive personality and strong clinical background. You can called the [facility] at [phone] to schedule an appointment.     Follow-up: As Needed    Feel free to call if you have any questions or concerns. By working together with you and your doctor, I hope to help you feel confident managing your medicines and improving your quality of life.       Best wishes,         Naveed Ortiz, [place], BCACP  Medication Therapy Management Pharmacist  Pager: [phone]

## 2022-05-20 ENCOUNTER — TELEPHONE (OUTPATIENT)
Dept: PULMONOLOGY | Facility: CLINIC | Age: 62
End: 2022-05-20
Payer: MEDICARE

## 2022-05-20 RX ORDER — SCOPOLAMINE HYDROBROMIDE
POWDER (GRAM) MISCELLANEOUS
Qty: 450 G | Refills: 1 | Status: SHIPPED | OUTPATIENT
Start: 2022-05-20 | End: 2022-11-20

## 2022-05-20 NOTE — TELEPHONE ENCOUNTER
Talked with patient's legal guardian, Savannah Syedrosa, who stated that she has permission to do Keyon Farias's scheduling. Scheduled patient for a video visit with Dr. Bermudez on 6/1/22 per nurse message. Savannah agreeable to this time and date and requested to do a video visit. Details of appointment confirmed with Savannah. I offered a copy of the Patient Bill of Rights, but Savannah declined a copy of the Patient Bill of Rights. Patient/Savannah' phone number confirmed as to where to send the video appointment link to.

## 2022-05-20 NOTE — TELEPHONE ENCOUNTER
Refilled scopolamine. Ok per provider. Chart passed to clinic coordinators to schedule follow-up appointment.

## 2022-05-23 NOTE — PATIENT INSTRUCTIONS
"Recommendations from today's MTM visit:                                                    MTM (medication therapy management) is a service provided by a clinical pharmacist designed to help you get the most of out of your medicines.   Today we reviewed what your medicines are for, how to know if they are working, that your medicines are safe and how to make your medicine regimen as easy as possible.      1. Continue to administer Keyon's medications as prescribed and attend his upcoming follow-up appointments.     2. I have a colleague at the [facility] named Julia Charles PharmD, STONEYCP who may be a good resource for Keyon in the future. I did reach out to her regarding new internal medicine provider if he transfers care to our primary care clinics.  She suggested Dr. Edita Pope as a possible provider to try to establish with due to her positive personality and strong clinical background. You can called the [facility] at [phone] to schedule an appointment.     Follow-up: As Needed    It was great speaking with you today.  I value your experience and would be very thankful for your time in providing feedback in our clinic survey. In the next few days, you may receive an email or text message from [employer] with a link to a survey related to your  clinical pharmacist.\"     To schedule another MTM appointment, please call the clinic directly or you may call the MTM scheduling line at [phone] or toll-free at [phone].     My Clinical Pharmacist's contact information:                                                      Please feel free to contact me with any questions or concerns you have.      Naveed Ortiz PharmD, STONEYCP  Medication Therapy Management Pharmacist  Pager: [phone]      "

## 2022-05-26 ENCOUNTER — APPOINTMENT (OUTPATIENT)
Dept: GENERAL RADIOLOGY | Facility: CLINIC | Age: 62
DRG: 871 | End: 2022-05-26
Attending: EMERGENCY MEDICINE
Payer: MEDICARE

## 2022-05-26 ENCOUNTER — HOSPITAL ENCOUNTER (INPATIENT)
Facility: CLINIC | Age: 62
LOS: 13 days | Discharge: HOME HEALTH | DRG: 871 | End: 2022-06-08
Attending: EMERGENCY MEDICINE | Admitting: HOSPITALIST
Payer: MEDICARE

## 2022-05-26 DIAGNOSIS — A41.9 SEVERE SEPSIS (H): ICD-10-CM

## 2022-05-26 DIAGNOSIS — N39.0 URINARY TRACT INFECTION IN MALE: ICD-10-CM

## 2022-05-26 DIAGNOSIS — A41.9 SEPSIS, DUE TO UNSPECIFIED ORGANISM, UNSPECIFIED WHETHER ACUTE ORGAN DYSFUNCTION PRESENT (H): ICD-10-CM

## 2022-05-26 DIAGNOSIS — A41.9 SEPTIC SHOCK (H): Primary | ICD-10-CM

## 2022-05-26 DIAGNOSIS — R65.20 SEVERE SEPSIS (H): ICD-10-CM

## 2022-05-26 DIAGNOSIS — R65.21 SEPTIC SHOCK (H): Primary | ICD-10-CM

## 2022-05-26 LAB
ALBUMIN UR-MCNC: 30 MG/DL
ANION GAP SERPL CALCULATED.3IONS-SCNC: 2 MMOL/L (ref 3–14)
APPEARANCE UR: ABNORMAL
BACTERIA #/AREA URNS HPF: ABNORMAL /HPF
BASOPHILS # BLD AUTO: 0.1 10E3/UL (ref 0–0.2)
BASOPHILS NFR BLD AUTO: 1 %
BILIRUB UR QL STRIP: NEGATIVE
BUN SERPL-MCNC: 20 MG/DL (ref 7–30)
CALCIUM SERPL-MCNC: 8.5 MG/DL (ref 8.5–10.1)
CHLORIDE BLD-SCNC: 96 MMOL/L (ref 94–109)
CO2 SERPL-SCNC: 34 MMOL/L (ref 20–32)
COLOR UR AUTO: YELLOW
CREAT SERPL-MCNC: 0.92 MG/DL (ref 0.66–1.25)
EOSINOPHIL # BLD AUTO: 0.5 10E3/UL (ref 0–0.7)
EOSINOPHIL NFR BLD AUTO: 3 %
ERYTHROCYTE [DISTWIDTH] IN BLOOD BY AUTOMATED COUNT: 13 % (ref 10–15)
GFR SERPL CREATININE-BSD FRML MDRD: >90 ML/MIN/1.73M2
GLUCOSE BLD-MCNC: 106 MG/DL (ref 70–99)
GLUCOSE UR STRIP-MCNC: NEGATIVE MG/DL
HCO3 BLDV-SCNC: 40 MMOL/L (ref 21–28)
HCT VFR BLD AUTO: 41.9 % (ref 40–53)
HGB BLD-MCNC: 13.8 G/DL (ref 13.3–17.7)
HGB UR QL STRIP: NEGATIVE
HOLD SPECIMEN: NORMAL
HOLD SPECIMEN: NORMAL
IMM GRANULOCYTES # BLD: 0.1 10E3/UL
IMM GRANULOCYTES NFR BLD: 1 %
KETONES UR STRIP-MCNC: NEGATIVE MG/DL
LACTATE BLD-SCNC: 1.4 MMOL/L
LACTATE SERPL-SCNC: 1.8 MMOL/L (ref 0.7–2)
LEUKOCYTE ESTERASE UR QL STRIP: ABNORMAL
LYMPHOCYTES # BLD AUTO: 2.3 10E3/UL (ref 0.8–5.3)
LYMPHOCYTES NFR BLD AUTO: 12 %
MCH RBC QN AUTO: 30.1 PG (ref 26.5–33)
MCHC RBC AUTO-ENTMCNC: 32.9 G/DL (ref 31.5–36.5)
MCV RBC AUTO: 91 FL (ref 78–100)
MONOCYTES # BLD AUTO: 1.4 10E3/UL (ref 0–1.3)
MONOCYTES NFR BLD AUTO: 7 %
NEUTROPHILS # BLD AUTO: 15.1 10E3/UL (ref 1.6–8.3)
NEUTROPHILS NFR BLD AUTO: 76 %
NITRATE UR QL: POSITIVE
NRBC # BLD AUTO: 0 10E3/UL
NRBC BLD AUTO-RTO: 0 /100
PCO2 BLDV: 61 MM HG (ref 40–50)
PH BLDV: 7.42 [PH] (ref 7.32–7.43)
PH UR STRIP: 8.5 [PH] (ref 5–7)
PLATELET # BLD AUTO: 186 10E3/UL (ref 150–450)
PO2 BLDV: 20 MM HG (ref 25–47)
POTASSIUM BLD-SCNC: 3.7 MMOL/L (ref 3.4–5.3)
RBC # BLD AUTO: 4.59 10E6/UL (ref 4.4–5.9)
RBC URINE: 4 /HPF
SAO2 % BLDV: 31 % (ref 94–100)
SARS-COV-2 RNA RESP QL NAA+PROBE: NEGATIVE
SODIUM SERPL-SCNC: 132 MMOL/L (ref 133–144)
SP GR UR STRIP: 1.03 (ref 1–1.03)
UROBILINOGEN UR STRIP-MCNC: 8 MG/DL
WBC # BLD AUTO: 19.5 10E3/UL (ref 4–11)
WBC URINE: 180 /HPF

## 2022-05-26 PROCEDURE — 80048 BASIC METABOLIC PNL TOTAL CA: CPT | Performed by: EMERGENCY MEDICINE

## 2022-05-26 PROCEDURE — 250N000011 HC RX IP 250 OP 636: Performed by: HOSPITALIST

## 2022-05-26 PROCEDURE — 82803 BLOOD GASES ANY COMBINATION: CPT

## 2022-05-26 PROCEDURE — 96365 THER/PROPH/DIAG IV INF INIT: CPT

## 2022-05-26 PROCEDURE — 87077 CULTURE AEROBIC IDENTIFY: CPT | Performed by: EMERGENCY MEDICINE

## 2022-05-26 PROCEDURE — 71046 X-RAY EXAM CHEST 2 VIEWS: CPT

## 2022-05-26 PROCEDURE — 99223 1ST HOSP IP/OBS HIGH 75: CPT | Mod: AI | Performed by: HOSPITALIST

## 2022-05-26 PROCEDURE — 87149 DNA/RNA DIRECT PROBE: CPT | Performed by: EMERGENCY MEDICINE

## 2022-05-26 PROCEDURE — 99285 EMERGENCY DEPT VISIT HI MDM: CPT | Mod: 25

## 2022-05-26 PROCEDURE — 85041 AUTOMATED RBC COUNT: CPT | Performed by: EMERGENCY MEDICINE

## 2022-05-26 PROCEDURE — 81001 URINALYSIS AUTO W/SCOPE: CPT | Performed by: EMERGENCY MEDICINE

## 2022-05-26 PROCEDURE — U0005 INFEC AGEN DETEC AMPLI PROBE: HCPCS | Performed by: EMERGENCY MEDICINE

## 2022-05-26 PROCEDURE — 250N000013 HC RX MED GY IP 250 OP 250 PS 637: Performed by: HOSPITALIST

## 2022-05-26 PROCEDURE — 250N000011 HC RX IP 250 OP 636: Performed by: EMERGENCY MEDICINE

## 2022-05-26 PROCEDURE — 87086 URINE CULTURE/COLONY COUNT: CPT | Performed by: EMERGENCY MEDICINE

## 2022-05-26 PROCEDURE — C9803 HOPD COVID-19 SPEC COLLECT: HCPCS

## 2022-05-26 PROCEDURE — 120N000001 HC R&B MED SURG/OB

## 2022-05-26 PROCEDURE — 36415 COLL VENOUS BLD VENIPUNCTURE: CPT | Performed by: HOSPITALIST

## 2022-05-26 PROCEDURE — 258N000003 HC RX IP 258 OP 636: Performed by: EMERGENCY MEDICINE

## 2022-05-26 PROCEDURE — 83605 ASSAY OF LACTIC ACID: CPT | Performed by: HOSPITALIST

## 2022-05-26 PROCEDURE — 36415 COLL VENOUS BLD VENIPUNCTURE: CPT | Performed by: EMERGENCY MEDICINE

## 2022-05-26 PROCEDURE — 96366 THER/PROPH/DIAG IV INF ADDON: CPT

## 2022-05-26 RX ORDER — ONDANSETRON 4 MG/1
4 TABLET, ORALLY DISINTEGRATING ORAL EVERY 6 HOURS PRN
Status: DISCONTINUED | OUTPATIENT
Start: 2022-05-26 | End: 2022-06-08 | Stop reason: HOSPADM

## 2022-05-26 RX ORDER — ACETAMINOPHEN 325 MG/1
650 TABLET ORAL EVERY 6 HOURS PRN
Status: DISCONTINUED | OUTPATIENT
Start: 2022-05-26 | End: 2022-06-02

## 2022-05-26 RX ORDER — PIPERACILLIN SODIUM, TAZOBACTAM SODIUM 4; .5 G/20ML; G/20ML
4.5 INJECTION, POWDER, LYOPHILIZED, FOR SOLUTION INTRAVENOUS ONCE
Status: COMPLETED | OUTPATIENT
Start: 2022-05-26 | End: 2022-05-26

## 2022-05-26 RX ORDER — LEVOTHYROXINE SODIUM 150 UG/1
150 TABLET ORAL DAILY
Status: DISCONTINUED | OUTPATIENT
Start: 2022-05-27 | End: 2022-05-27

## 2022-05-26 RX ORDER — LIDOCAINE 40 MG/G
CREAM TOPICAL
Status: DISCONTINUED | OUTPATIENT
Start: 2022-05-26 | End: 2022-05-27

## 2022-05-26 RX ORDER — CALCIUM CARBONATE 1250 MG/5ML
1250 SUSPENSION ORAL 3 TIMES DAILY
Status: DISCONTINUED | OUTPATIENT
Start: 2022-05-26 | End: 2022-06-08 | Stop reason: HOSPADM

## 2022-05-26 RX ORDER — ALBUTEROL SULFATE 0.83 MG/ML
2.5 SOLUTION RESPIRATORY (INHALATION)
Status: DISCONTINUED | OUTPATIENT
Start: 2022-05-26 | End: 2022-06-08 | Stop reason: HOSPADM

## 2022-05-26 RX ORDER — CARBAMAZEPINE 100 MG/5ML
150 SUSPENSION ORAL 3 TIMES DAILY
Status: DISCONTINUED | OUTPATIENT
Start: 2022-05-27 | End: 2022-06-08 | Stop reason: HOSPADM

## 2022-05-26 RX ORDER — ASCORBIC ACID 500 MG
1000 TABLET ORAL DAILY
Status: DISCONTINUED | OUTPATIENT
Start: 2022-05-27 | End: 2022-06-08 | Stop reason: HOSPADM

## 2022-05-26 RX ORDER — GUAIFENESIN 600 MG/1
15 TABLET, EXTENDED RELEASE ORAL DAILY
Status: DISCONTINUED | OUTPATIENT
Start: 2022-05-27 | End: 2022-06-08 | Stop reason: HOSPADM

## 2022-05-26 RX ORDER — CARBAMAZEPINE 100 MG/5ML
100 SUSPENSION ORAL DAILY
Status: DISCONTINUED | OUTPATIENT
Start: 2022-05-27 | End: 2022-05-27

## 2022-05-26 RX ORDER — BISACODYL 10 MG
10 SUPPOSITORY, RECTAL RECTAL DAILY PRN
Status: DISCONTINUED | OUTPATIENT
Start: 2022-05-26 | End: 2022-06-08 | Stop reason: HOSPADM

## 2022-05-26 RX ORDER — ACETAMINOPHEN 650 MG/1
650 SUPPOSITORY RECTAL EVERY 6 HOURS PRN
Status: DISCONTINUED | OUTPATIENT
Start: 2022-05-26 | End: 2022-06-02

## 2022-05-26 RX ORDER — ASPIRIN 81 MG/1
81 TABLET, CHEWABLE ORAL DAILY
Status: DISCONTINUED | OUTPATIENT
Start: 2022-05-27 | End: 2022-06-08 | Stop reason: HOSPADM

## 2022-05-26 RX ORDER — ACETYLCYSTEINE 200 MG/ML
2 SOLUTION ORAL; RESPIRATORY (INHALATION) 4 TIMES DAILY
Status: DISCONTINUED | OUTPATIENT
Start: 2022-05-26 | End: 2022-06-08 | Stop reason: HOSPADM

## 2022-05-26 RX ORDER — ONDANSETRON 2 MG/ML
4 INJECTION INTRAMUSCULAR; INTRAVENOUS EVERY 6 HOURS PRN
Status: DISCONTINUED | OUTPATIENT
Start: 2022-05-26 | End: 2022-06-08 | Stop reason: HOSPADM

## 2022-05-26 RX ORDER — METOCLOPRAMIDE HYDROCHLORIDE 5 MG/5ML
10 SOLUTION ORAL
Status: DISCONTINUED | OUTPATIENT
Start: 2022-05-26 | End: 2022-05-27

## 2022-05-26 RX ORDER — AMOXICILLIN 250 MG
1 CAPSULE ORAL 2 TIMES DAILY PRN
Status: DISCONTINUED | OUTPATIENT
Start: 2022-05-26 | End: 2022-06-08 | Stop reason: HOSPADM

## 2022-05-26 RX ORDER — CHOLECALCIFEROL (VITAMIN D3) 50 MCG
50 TABLET ORAL DAILY
Status: DISCONTINUED | OUTPATIENT
Start: 2022-05-27 | End: 2022-06-08 | Stop reason: HOSPADM

## 2022-05-26 RX ORDER — PIPERACILLIN SODIUM, TAZOBACTAM SODIUM 3; .375 G/15ML; G/15ML
3.38 INJECTION, POWDER, LYOPHILIZED, FOR SOLUTION INTRAVENOUS EVERY 6 HOURS
Status: DISCONTINUED | OUTPATIENT
Start: 2022-05-26 | End: 2022-05-27

## 2022-05-26 RX ORDER — SODIUM BICARBONATE 650 MG/1
650 TABLET ORAL 2 TIMES DAILY
Status: DISCONTINUED | OUTPATIENT
Start: 2022-05-26 | End: 2022-05-29

## 2022-05-26 RX ORDER — HYDROCORTISONE 5 MG/1
5 TABLET ORAL DAILY
Status: DISCONTINUED | OUTPATIENT
Start: 2022-05-27 | End: 2022-06-02

## 2022-05-26 RX ORDER — AMOXICILLIN 250 MG
2 CAPSULE ORAL 2 TIMES DAILY PRN
Status: DISCONTINUED | OUTPATIENT
Start: 2022-05-26 | End: 2022-06-08 | Stop reason: HOSPADM

## 2022-05-26 RX ADMIN — PIPERACILLIN SODIUM AND TAZOBACTAM SODIUM 3.38 G: 3; .375 INJECTION, POWDER, LYOPHILIZED, FOR SOLUTION INTRAVENOUS at 23:28

## 2022-05-26 RX ADMIN — BRIVARACETAM 100 MG: 10 SOLUTION ORAL at 22:25

## 2022-05-26 RX ADMIN — ACETAMINOPHEN 650 MG: 325 TABLET ORAL at 22:19

## 2022-05-26 RX ADMIN — SODIUM CHLORIDE 1000 ML: 9 INJECTION, SOLUTION INTRAVENOUS at 16:37

## 2022-05-26 RX ADMIN — CALCIUM CARBONATE 1250 MG: 1250 SUSPENSION ORAL at 22:25

## 2022-05-26 RX ADMIN — PIPERACILLIN SODIUM AND TAZOBACTAM SODIUM 4.5 G: 4; .5 INJECTION, POWDER, LYOPHILIZED, FOR SOLUTION INTRAVENOUS at 17:01

## 2022-05-26 ASSESSMENT — ACTIVITIES OF DAILY LIVING (ADL)
ADLS_ACUITY_SCORE: 37

## 2022-05-26 ASSESSMENT — ENCOUNTER SYMPTOMS
FEVER: 1
COUGH: 0
VOMITING: 1

## 2022-05-26 NOTE — ED TRIAGE NOTES
Pt coming from home via EMS - pt lives with wife who is caretaker - pt had a fever that wife gave Tylenol 975 mg po at 1330  Pt was hypotensive for EMS 84/55 and 93/56

## 2022-05-26 NOTE — ED NOTES
[facility]  ED Nurse Handoff Report    ED Chief complaint: Fever      ED Diagnosis:   Final diagnoses:   None       Code Status: Full Code    Allergies:   Allergies   Allergen Reactions     Valproic Acid Other (See Comments)     Toxicity w/ bone marrow suspension, elevated ammonia levels      Dilantin [Phenytoin Sodium] Other (See Comments)     Severe Trembling     Scopolamine Hives     Hives with the patch - oral no problem       Patient Story: Pt arrived per EMS from home where his caregiver is his mother. She noted pt to be febrile today with temp of 102. Pt has history of frequent pneumonia but has not had an episode for about 6 months. Most recent admission was for hyponatremia.    Focused Assessment:  Pt received tylenol at 1330 PTA and was found to be slightly tachycardic and hypotensive on arrival. Pt is at baseline cognitively, non-verbal but responds to staff and is alert. Will respond with 1 word answers appropriately at times. Cooperative with cares.    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               Family Comments: Mother  (guardian) at bedside  OBS brochure/video discussed/provided to patient/family: No              Name of person given brochure if not patient: n/a              Relationship to patient: n/a    For the majority of the shift this patient's behavior was Green.   Behavioral interventions performed were Rounding.    ED NURSE PHONE NUMBER: *[phone]

## 2022-05-26 NOTE — ED PROVIDER NOTES
History   Chief Complaint:  Fever     The history is provided by the patient and medical records.      Keyon Farias is a 61 year old male with history of TBI, aphasia, hemiplegia, seizure, dysphagia, with GJ tube and recurrent aspiration, and recent admission 05/09-05/12 for hyponatremia and encephalopathy who presents with fever. The patient had onset of fever of 101.4 at noon today per his mother. No fevers prior to today. Mother gave 650 mg of Tylenol and another half tablet of Tylenol. The patient uses a feeding tube exclusively and has not had a recent aspiration. The patient had a couple of episode of emesis a few days ago, and caregiver noted thick phlegm in the emesis. No cough. No recent change in feeding tube tube flushes. No recent seizures or falls. No recent medication changes other than an added sodium tablet. He is not anticoagulated. He has received the COVID vaccine.     Review of Systems   Constitutional: Positive for fever.   Respiratory: Negative for cough.    Gastrointestinal: Positive for vomiting.   All other systems reviewed and are negative.    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  DVT of upper extremity   GERD  Panhypopituitarism   Pneumonia  Seizures  Sepsis due to UTI  Spastic hemiplegia affecting dominant side  Thyroid disease  Tracheostomy care  Traumatic brain injury   Cerebral artery occlusion with cerebral infarction   Ventricular fibrillation  Ventricular tachyarrhythmia    Past Surgical History:    Esophagogastroduodenoscopy with necrosectomy   Esophagogastroduodenoscopy (x3)  Reconstructive facial surgery   Appendectomy  Gastrotomy   Tracheostomy (x2)     Family History:    Father - Cancer     Social History:  The patient arrived to the emergency department via EMS.  Mother is at  bedside.     Physical Exam     Patient Vitals for the past 24 hrs:   BP Temp Temp src Pulse Resp SpO2   05/26/22 2125 90/52 (!) 100.8  F (38.2  C) Oral 118 24 96 %   05/26/22 2030 105/56 -- -- 109 27 95 %   05/26/22 2015 101/52 -- -- 103 24 94 %   05/26/22 1930 (!) 98/39 -- -- 116 14 --   05/26/22 1900 115/61 -- -- 112 22 --   05/26/22 1830 100/60 -- -- 109 21 --   05/26/22 1800 97/72 -- -- 110 14 --   05/26/22 1730 (!) 89/61 -- -- 108 20 --   05/26/22 1700 119/56 -- -- 110 17 95 %   05/26/22 1630 97/59 -- -- 117 9 99 %   05/26/22 1545 99/52 -- -- 98 21 96 %   05/26/22 1530 103/65 -- -- 105 17 95 %   05/26/22 1515 91/52 -- -- 112 23 95 %   05/26/22 1500 93/53 99.1  F (37.3  C) Oral 104 16 94 %     Physical Exam  Physical Exam   Constitutional:  Very pleasant non-verbal man. No acute distress.   HENT:   Mouth/Throat:   Oropharynx is clear and moist.   Eyes:    Conjunctivae normal and EOM are normal. Pupils are equal, round, and reactive to light.   Neck:    Normal range of motion.   Cardiovascular: Normal rate, regular rhythm and normal heart sounds.  Exam reveals no gallop and no friction rub.  No murmur heard.  Pulmonary/Chest:  Diminished breath sounds. Patient has no wheezes. Patient has no rales. No respiratory distress.   Abdominal:   Soft. Bowel sounds are normal. Patient exhibits no mass. There is no tenderness. There is no rebound and no guarding. GJ tube in place with insertion site clean, dry, and with no erythema.   :   Condom catheter in place. Leg bag with orange concentrated urine.   Musculoskeletal:  Normal range of motion. Patient exhibits no edema.   Neurological:   Patient is non-verbal, with right sided spastic hemiplegia.  Otherwise does not move extremities.  This is his baseline.  Skin:   Skin is warm and dry. No rash noted. No erythema.   Psychiatric:   Smiles and is interactive on exam    Emergency Department Course   Imaging:  Chest XR,  PA & LAT   Final Result   IMPRESSION: No  radiographic evidence of acute chest abnormality.       ANNETTE CASTRO MD            SYSTEM ID:  [id number]      Report per radiology    Laboratory:  Labs Ordered and Resulted from Time of ED Arrival to Time of ED Departure   BASIC METABOLIC PANEL - Abnormal       Result Value    Sodium 132 (*)     Potassium 3.7      Chloride 96      Carbon Dioxide (CO2) 34 (*)     Anion Gap 2 (*)     Urea Nitrogen 20      Creatinine 0.92      Calcium 8.5      Glucose 106 (*)     GFR Estimate >90     CBC WITH PLATELETS AND DIFFERENTIAL - Abnormal    WBC Count 19.5 (*)     RBC Count 4.59      Hemoglobin 13.8      Hematocrit 41.9      MCV 91      MCH 30.1      MCHC 32.9      RDW 13.0      Platelet Count 186      % Neutrophils 76      % Lymphocytes 12      % Monocytes 7      % Eosinophils 3      % Basophils 1      % Immature Granulocytes 1      NRBCs per 100 WBC 0      Absolute Neutrophils 15.1 (*)     Absolute Lymphocytes 2.3      Absolute Monocytes 1.4 (*)     Absolute Eosinophils 0.5      Absolute Basophils 0.1      Absolute Immature Granulocytes 0.1      Absolute NRBCs 0.0     ISTAT GASES LACTATE VENOUS POCT - Abnormal    Lactic Acid POCT 1.4      Bicarbonate Venous POCT 40 (*)     O2 Sat, Venous POCT 31 (*)     pCO2V Venous POCT 61 (*)     pH Venous POCT 7.42      pO2 Venous POCT 20 (*)    ROUTINE UA WITH MICROSCOPIC REFLEX TO CULTURE - Abnormal    Color Urine Yellow      Appearance Urine Slightly Cloudy (*)     Glucose Urine Negative      Bilirubin Urine Negative      Ketones Urine Negative      Specific Gravity Urine 1.029      Blood Urine Negative      pH Urine 8.5 (*)     Protein Albumin Urine 30  (*)     Urobilinogen Urine 8.0 (*)     Nitrite Urine Positive (*)     Leukocyte Esterase Urine Large (*)     Bacteria Urine Many (*)     RBC Urine 4 (*)     WBC Urine 180 (*)    COVID-19 VIRUS (CORONAVIRUS) BY PCR - Normal    SARS CoV2 PCR Negative     URINE CULTURE   BLOOD CULTURE   BLOOD CULTURE      Emergency Department Course:      Reviewed:  I reviewed nursing notes, vitals, past medical history and Care Everywhere    Assessments:  1548 I obtained history and examined the patient as noted above.   1631 I rechecked the patient and explained findings.    Consults:  1859 I spoke to Dr. Epperson of the hospitalist service who accepts the patient for admission.     Interventions:  1637 0.9% sodium chloride BOLUS 1000 mL IV  1701 Zosyn 4.5 g IV    Disposition:  The patient was admitted to the hospital under the care of Dr. Epperson.     Impression & Plan   Medical Decision Making:  Keyon Farias is a 61-year-old gentleman presenting to the emergency room with his mother for concern for fever.  This patient is a complex patient with many hospitalizations.  Urine analysis was very cloudy today even catching a clean sample.  His blood pressures were soft here but did respond to IV fluids.  Chest x-ray does not show any evidence of pneumonia, pneumothorax, pleural effusion, mass, abnormal mediastinum.  His white blood cell count is elevated.  His sodium is slightly low however this is consistent with when he was discharged.  He is mildly tachycardic here.  He does meet sepsis criteria, lactic is normal.  Zosyn was administered after reviewing his previous urine cultures.  At this time I will admit him to hospitalist.    Diagnosis:    ICD-10-CM    1. Urinary tract infection in male  N39.0    2. Sepsis, due to unspecified organism, unspecified whether acute organ dysfunction present (H)  A41.9      Scribe Disclosure:  I, Phylicia Saab, am serving as a scribe at 3:27 PM on 5/26/2022 to document services personally performed by Courtney Frazier MD based on my observations and the provider's statements to me.     Courtney Frazier MD  05/26/22 2660       Courtney Frazier MD  05/26/22 2967

## 2022-05-26 NOTE — PHARMACY-ADMISSION MEDICATION HISTORY
Pharmacy Medication History  Admission medication history interview status for the 5/26/2022  admission is complete. See EPIC admission navigator for prior to admission medications     Location of Interview: Phone  Medication history sources: Patient's family/friend (mom/Savannah)    Significant changes made to the medication list:      In the past week, patient estimated taking medication this percent of the time: greater than 90%    Additional medication history information:       Medication reconciliation completed by provider prior to medication history? No    Time spent in this activity: 12min    Prior to Admission medications    Medication Sig Last Dose Taking? Auth Provider   acetaminophen (TYLENOL) 650 MG CR tablet Take 650 mg by mouth every 8 hours as needed for mild pain or fever  Yes Unknown, Entered By History   acetylcysteine (MUCOMYST) 20 % neb solution Take 2 mLs by nebulization 4 times daily With albuterol at 0700, 1100, 1500, and 1900  Yes Unknown, Entered By History   albuterol (PROVENTIL) (5 MG/ML) 0.5% neb solution Take 2.5 mg by nebulization every 4 hours (while awake) 0700 1100 1500 1900 with mucomyst  Yes Unknown, Entered By History   aspirin (ASA) 81 MG chewable tablet 81 mg by Oral or Feeding Tube route daily At 0900 5/26/2022 at Unknown time Yes Unknown, Entered By History   bacitracin ointment Apply topically daily as needed for wound care To PEG site.  Yes Unknown, Entered By History   Brivaracetam (BRIVIACT) 10 MG/ML solution 100 mg by Oral or Feeding Tube route 2 times daily 0900, 2100 5/26/2022 at Unknown time Yes Unknown, Entered By History   calcium carbonate 1250 MG/5ML SUSP suspension Take 1,250 mg by mouth 3 times daily 0900, 1500, 2100 5/26/2022 at Unknown time Yes Unknown, Entered By History   carBAMazepine (TEGRETOL) 100 MG/5ML suspension Take 100 mg by mouth daily Take at 1800 5/25/2022 at Unknown time Yes Unknown, Entered By History   carBAMazepine (TEGRETOL) 100 MG/5ML  suspension 150 mg by Oral or Feeding Tube route 3 times daily At 06:00, 12:00, and 24:00 for seizures 5/26/2022 at Unknown time Yes Unknown, Entered By History   hydrocortisone (CORTAID) 1 % external cream Apply topically 2 times daily as needed Apply to reddened memo areas as needed  Yes Unknown, Entered By History   hydrocortisone (CORTEF) 5 MG tablet Take 15 mg (3 tablets) in the morning and 5 mg (1 tablet)  at 2:00 PM. During illness patient takes more as a stress dose. Please increase the dose as directed. 5/26/2022 at Unknown time Yes Faizan Mclean MD   levothyroxine (SYNTHROID/LEVOTHROID) 150 MCG tablet Take 1 tablet (150 mcg) by mouth daily 5/26/2022 at Unknown time Yes Faizan Mclean MD   metoclopramide (REGLAN) 10 MG/10ML SOLN solution Take 10 mg by mouth 4 times daily (before meals and nightly) 0800, 1200, 1600, 2000  Disconnects bag before administration, then waits 45 mins before reconnecting after giving the medication 5/26/2022 at Unknown time Yes Unknown, Entered By History   multivitamin, therapeutic (THERA-VIT) TABS tablet Take 1 tablet by mouth daily 5/26/2022 at Unknown time Yes Reported, Patient   mupirocin (BACTROBAN) 2 % external ointment Apply topically 2 times daily as needed   Yes Reported, Patient   pantoprazole (PROTONIX) 2 mg/mL SUSP suspension 20 mLs (40 mg) by Per J Tube route daily 5/26/2022 at Unknown time Yes Alvaro Barahona MD   potassium & sodium phosphates (NEUTRA-PHOS) 280-160-250 MG Packet Take 1 packet by mouth 3 times daily Takes at 0800, 1500, and 2100 5/26/2022 at Unknown time Yes Yanely Liriano MD   Scopolamine HBr POWD Dispense #90. Mix contents with small amount of water for admin via J-tube.  Administer 0.8 mg three times each day as needed.  Yes Jennie Bermudez MD   Skin Protectants, Misc. (BALMEX SKIN PROTECTANT) OINT Externally apply topically 2 times daily as needed (irritation) Applay to reddened memo areas twice daily as needed  Yes  Unknown, Entered By History   sodium bicarbonate 650 MG tablet Take 1 tablet (650 mg) by mouth 2 times daily 5/26/2022 at Unknown time Yes Rupali Costa MD   testosterone cypionate (DEPOTESTOSTERONE) 200 MG/ML injection Inject 0.3 mLs (60 mg) into the muscle every 7 days New dose 5/25/2022 at Unknown time Yes Faizan Mclean MD   vitamin C (ASCORBIC ACID) 1000 MG TABS 1,000 mg by Oral or Feeding Tube route daily  5/26/2022 at Unknown time Yes Unknown, Entered By History   vitamin D3 (CHOLECALCIFEROL) 2000 units (50 mcg) tablet Take 2,000 Units by mouth daily Crush and feed via j-tube @@ 0900 5/26/2022 at Unknown time Yes Unknown, Entered By History       The information provided in this note is only as accurate as the sources available at the time of update(s)

## 2022-05-27 ENCOUNTER — APPOINTMENT (OUTPATIENT)
Dept: GENERAL RADIOLOGY | Facility: CLINIC | Age: 62
DRG: 871 | End: 2022-05-27
Attending: INTERNAL MEDICINE
Payer: MEDICARE

## 2022-05-27 ENCOUNTER — APPOINTMENT (OUTPATIENT)
Dept: CT IMAGING | Facility: CLINIC | Age: 62
DRG: 871 | End: 2022-05-27
Attending: NURSE PRACTITIONER
Payer: MEDICARE

## 2022-05-27 ENCOUNTER — ANESTHESIA EVENT (OUTPATIENT)
Dept: INTENSIVE CARE | Facility: CLINIC | Age: 62
DRG: 871 | End: 2022-05-27
Payer: MEDICARE

## 2022-05-27 ENCOUNTER — ANESTHESIA (OUTPATIENT)
Dept: INTENSIVE CARE | Facility: CLINIC | Age: 62
DRG: 871 | End: 2022-05-27
Payer: MEDICARE

## 2022-05-27 PROBLEM — A41.9 SEPTIC SHOCK (H): Status: ACTIVE | Noted: 2021-12-06

## 2022-05-27 PROBLEM — R65.21 SEPTIC SHOCK (H): Status: ACTIVE | Noted: 2021-12-06

## 2022-05-27 LAB
ALBUMIN SERPL-MCNC: 2.1 G/DL (ref 3.4–5)
ALBUMIN SERPL-MCNC: 2.5 G/DL (ref 3.4–5)
ALP SERPL-CCNC: 106 U/L (ref 40–150)
ALP SERPL-CCNC: 84 U/L (ref 40–150)
ALT SERPL W P-5'-P-CCNC: 21 U/L (ref 0–70)
ALT SERPL W P-5'-P-CCNC: 26 U/L (ref 0–70)
ANION GAP SERPL CALCULATED.3IONS-SCNC: 11 MMOL/L (ref 3–14)
ANION GAP SERPL CALCULATED.3IONS-SCNC: 8 MMOL/L (ref 3–14)
AST SERPL W P-5'-P-CCNC: 42 U/L (ref 0–45)
AST SERPL W P-5'-P-CCNC: 50 U/L (ref 0–45)
BASE EXCESS BLDA CALC-SCNC: -3.6 MMOL/L (ref -9–1.8)
BASE EXCESS BLDV CALC-SCNC: -6.1 MMOL/L (ref -7.7–1.9)
BILIRUB DIRECT SERPL-MCNC: 0.9 MG/DL (ref 0–0.2)
BILIRUB SERPL-MCNC: 1.2 MG/DL (ref 0.2–1.3)
BILIRUB SERPL-MCNC: 1.7 MG/DL (ref 0.2–1.3)
BUN SERPL-MCNC: 19 MG/DL (ref 7–30)
BUN SERPL-MCNC: 25 MG/DL (ref 7–30)
CALCIUM SERPL-MCNC: 7.4 MG/DL (ref 8.5–10.1)
CALCIUM SERPL-MCNC: 8 MG/DL (ref 8.5–10.1)
CHLORIDE BLD-SCNC: 100 MMOL/L (ref 94–109)
CHLORIDE BLD-SCNC: 106 MMOL/L (ref 94–109)
CO2 SERPL-SCNC: 19 MMOL/L (ref 20–32)
CO2 SERPL-SCNC: 27 MMOL/L (ref 20–32)
CREAT SERPL-MCNC: 1.32 MG/DL (ref 0.66–1.25)
CREAT SERPL-MCNC: 1.57 MG/DL (ref 0.66–1.25)
ENTEROCOCCUS FAECALIS: NOT DETECTED
ENTEROCOCCUS FAECIUM: NOT DETECTED
ERYTHROCYTE [DISTWIDTH] IN BLOOD BY AUTOMATED COUNT: 13.4 % (ref 10–15)
ERYTHROCYTE [DISTWIDTH] IN BLOOD BY AUTOMATED COUNT: 13.6 % (ref 10–15)
GFR SERPL CREATININE-BSD FRML MDRD: 50 ML/MIN/1.73M2
GFR SERPL CREATININE-BSD FRML MDRD: 62 ML/MIN/1.73M2
GLUCOSE BLD-MCNC: 105 MG/DL (ref 70–99)
GLUCOSE BLD-MCNC: 156 MG/DL (ref 70–99)
GLUCOSE BLDC GLUCOMTR-MCNC: 187 MG/DL (ref 70–99)
GLUCOSE BLDC GLUCOMTR-MCNC: 208 MG/DL (ref 70–99)
GLUCOSE BLDC GLUCOMTR-MCNC: 319 MG/DL (ref 70–99)
GLUCOSE BLDC GLUCOMTR-MCNC: 364 MG/DL (ref 70–99)
GLUCOSE BLDC GLUCOMTR-MCNC: 89 MG/DL (ref 70–99)
HCO3 BLD-SCNC: 18 MMOL/L (ref 21–28)
HCO3 BLDV-SCNC: 20 MMOL/L (ref 21–28)
HCT VFR BLD AUTO: 35.5 % (ref 40–53)
HCT VFR BLD AUTO: 43.1 % (ref 40–53)
HGB BLD-MCNC: 11.6 G/DL (ref 13.3–17.7)
HGB BLD-MCNC: 14 G/DL (ref 13.3–17.7)
LACTATE SERPL-SCNC: 0.7 MMOL/L (ref 0.7–2)
LACTATE SERPL-SCNC: 6.8 MMOL/L (ref 0.7–2)
LACTATE SERPL-SCNC: 6.9 MMOL/L (ref 0.7–2)
LISTERIA SPECIES (DETECTED/NOT DETECTED): NOT DETECTED
MCH RBC QN AUTO: 30 PG (ref 26.5–33)
MCH RBC QN AUTO: 30.3 PG (ref 26.5–33)
MCHC RBC AUTO-ENTMCNC: 32.5 G/DL (ref 31.5–36.5)
MCHC RBC AUTO-ENTMCNC: 32.7 G/DL (ref 31.5–36.5)
MCV RBC AUTO: 93 FL (ref 78–100)
MCV RBC AUTO: 93 FL (ref 78–100)
O2/TOTAL GAS SETTING VFR VENT: 40 %
O2/TOTAL GAS SETTING VFR VENT: 40 %
PCO2 BLD: 24 MM HG (ref 35–45)
PCO2 BLDV: 43 MM HG (ref 40–50)
PH BLD: 7.49 [PH] (ref 7.35–7.45)
PH BLDV: 7.28 [PH] (ref 7.32–7.43)
PLATELET # BLD AUTO: 161 10E3/UL (ref 150–450)
PLATELET # BLD AUTO: 168 10E3/UL (ref 150–450)
PO2 BLD: 178 MM HG (ref 80–105)
PO2 BLDV: 61 MM HG (ref 25–47)
POTASSIUM BLD-SCNC: 3.4 MMOL/L (ref 3.4–5.3)
POTASSIUM BLD-SCNC: 4.5 MMOL/L (ref 3.4–5.3)
PROT SERPL-MCNC: 5.5 G/DL (ref 6.8–8.8)
PROT SERPL-MCNC: 6.4 G/DL (ref 6.8–8.8)
RBC # BLD AUTO: 3.83 10E6/UL (ref 4.4–5.9)
RBC # BLD AUTO: 4.66 10E6/UL (ref 4.4–5.9)
SODIUM SERPL-SCNC: 135 MMOL/L (ref 133–144)
SODIUM SERPL-SCNC: 136 MMOL/L (ref 133–144)
STAPHYLOCOCCUS AUREUS: NOT DETECTED
STAPHYLOCOCCUS EPIDERMIDIS: NOT DETECTED
STAPHYLOCOCCUS LUGDUNENSIS: NOT DETECTED
STAPHYLOCOCCUS SPECIES: NOT DETECTED
STREPTOCOCCUS AGALACTIAE: NOT DETECTED
STREPTOCOCCUS ANGINOSUS GROUP: NOT DETECTED
STREPTOCOCCUS PNEUMONIAE: NOT DETECTED
STREPTOCOCCUS PYOGENES: NOT DETECTED
STREPTOCOCCUS SPECIES: DETECTED
WBC # BLD AUTO: 28.5 10E3/UL (ref 4–11)
WBC # BLD AUTO: 30.7 10E3/UL (ref 4–11)

## 2022-05-27 PROCEDURE — 99291 CRITICAL CARE FIRST HOUR: CPT | Performed by: INTERNAL MEDICINE

## 2022-05-27 PROCEDURE — 87040 BLOOD CULTURE FOR BACTERIA: CPT | Performed by: NURSE PRACTITIONER

## 2022-05-27 PROCEDURE — 94640 AIRWAY INHALATION TREATMENT: CPT

## 2022-05-27 PROCEDURE — 93010 ELECTROCARDIOGRAM REPORT: CPT | Performed by: INTERNAL MEDICINE

## 2022-05-27 PROCEDURE — 250N000009 HC RX 250

## 2022-05-27 PROCEDURE — 999N000009 HC STATISTIC AIRWAY CARE: Performed by: NURSE ANESTHETIST, CERTIFIED REGISTERED

## 2022-05-27 PROCEDURE — 83605 ASSAY OF LACTIC ACID: CPT | Performed by: INTERNAL MEDICINE

## 2022-05-27 PROCEDURE — 250N000011 HC RX IP 250 OP 636: Performed by: HOSPITALIST

## 2022-05-27 PROCEDURE — 999N000009 HC STATISTIC AIRWAY CARE

## 2022-05-27 PROCEDURE — 82803 BLOOD GASES ANY COMBINATION: CPT | Performed by: INTERNAL MEDICINE

## 2022-05-27 PROCEDURE — 74177 CT ABD & PELVIS W/CONTRAST: CPT | Mod: ME

## 2022-05-27 PROCEDURE — 258N000003 HC RX IP 258 OP 636: Performed by: NURSE ANESTHETIST, CERTIFIED REGISTERED

## 2022-05-27 PROCEDURE — 99291 CRITICAL CARE FIRST HOUR: CPT | Performed by: NURSE PRACTITIONER

## 2022-05-27 PROCEDURE — 999N000185 HC STATISTIC TRANSPORT TIME EA 15 MIN

## 2022-05-27 PROCEDURE — 250N000011 HC RX IP 250 OP 636: Performed by: NURSE PRACTITIONER

## 2022-05-27 PROCEDURE — 250N000009 HC RX 250: Performed by: NURSE PRACTITIONER

## 2022-05-27 PROCEDURE — 84450 TRANSFERASE (AST) (SGOT): CPT | Performed by: INTERNAL MEDICINE

## 2022-05-27 PROCEDURE — G1004 CDSM NDSC: HCPCS

## 2022-05-27 PROCEDURE — 258N000003 HC RX IP 258 OP 636: Performed by: NURSE PRACTITIONER

## 2022-05-27 PROCEDURE — 999N000157 HC STATISTIC RCP TIME EA 10 MIN

## 2022-05-27 PROCEDURE — 80053 COMPREHEN METABOLIC PANEL: CPT | Performed by: HOSPITALIST

## 2022-05-27 PROCEDURE — 99207 PR NO BILLABLE SERVICE THIS VISIT: CPT | Performed by: HOSPITALIST

## 2022-05-27 PROCEDURE — 85014 HEMATOCRIT: CPT | Performed by: INTERNAL MEDICINE

## 2022-05-27 PROCEDURE — 258N000003 HC RX IP 258 OP 636: Performed by: INTERNAL MEDICINE

## 2022-05-27 PROCEDURE — 85027 COMPLETE CBC AUTOMATED: CPT | Performed by: HOSPITALIST

## 2022-05-27 PROCEDURE — 200N000001 HC R&B ICU

## 2022-05-27 PROCEDURE — 5A1945Z RESPIRATORY VENTILATION, 24-96 CONSECUTIVE HOURS: ICD-10-PCS | Performed by: INTERNAL MEDICINE

## 2022-05-27 PROCEDURE — 84155 ASSAY OF PROTEIN SERUM: CPT | Performed by: INTERNAL MEDICINE

## 2022-05-27 PROCEDURE — 94640 AIRWAY INHALATION TREATMENT: CPT | Mod: 76

## 2022-05-27 PROCEDURE — 250N000009 HC RX 250: Performed by: HOSPITALIST

## 2022-05-27 PROCEDURE — 250N000013 HC RX MED GY IP 250 OP 250 PS 637: Performed by: HOSPITALIST

## 2022-05-27 PROCEDURE — 83605 ASSAY OF LACTIC ACID: CPT | Performed by: NURSE PRACTITIONER

## 2022-05-27 PROCEDURE — 36415 COLL VENOUS BLD VENIPUNCTURE: CPT | Performed by: NURSE PRACTITIONER

## 2022-05-27 PROCEDURE — 370N000003 HC ANESTHESIA WARD SERVICE

## 2022-05-27 PROCEDURE — 94002 VENT MGMT INPAT INIT DAY: CPT

## 2022-05-27 PROCEDURE — 999N000040 HC STATISTIC CONSULT NO CHARGE VASC ACCESS

## 2022-05-27 PROCEDURE — 99292 CRITICAL CARE ADDL 30 MIN: CPT | Performed by: NURSE PRACTITIONER

## 2022-05-27 PROCEDURE — 250N000012 HC RX MED GY IP 250 OP 636 PS 637: Performed by: INTERNAL MEDICINE

## 2022-05-27 PROCEDURE — 93005 ELECTROCARDIOGRAM TRACING: CPT

## 2022-05-27 PROCEDURE — 250N000011 HC RX IP 250 OP 636

## 2022-05-27 PROCEDURE — 36600 WITHDRAWAL OF ARTERIAL BLOOD: CPT

## 2022-05-27 PROCEDURE — 258N000003 HC RX IP 258 OP 636: Performed by: HOSPITALIST

## 2022-05-27 PROCEDURE — 36415 COLL VENOUS BLD VENIPUNCTURE: CPT | Performed by: HOSPITALIST

## 2022-05-27 PROCEDURE — 250N000011 HC RX IP 250 OP 636: Performed by: NURSE ANESTHETIST, CERTIFIED REGISTERED

## 2022-05-27 PROCEDURE — 258N000003 HC RX IP 258 OP 636

## 2022-05-27 PROCEDURE — 250N000013 HC RX MED GY IP 250 OP 250 PS 637

## 2022-05-27 PROCEDURE — 999N000065 XR CHEST PORT 1 VIEW

## 2022-05-27 PROCEDURE — 999N000128 HC STATISTIC PERIPHERAL IV START W/O US GUIDANCE

## 2022-05-27 PROCEDURE — 82248 BILIRUBIN DIRECT: CPT | Performed by: NURSE PRACTITIONER

## 2022-05-27 RX ORDER — NALOXONE HYDROCHLORIDE 0.4 MG/ML
0.2 INJECTION, SOLUTION INTRAMUSCULAR; INTRAVENOUS; SUBCUTANEOUS
Status: DISCONTINUED | OUTPATIENT
Start: 2022-05-27 | End: 2022-06-08 | Stop reason: HOSPADM

## 2022-05-27 RX ORDER — LEVOTHYROXINE SODIUM 75 UG/1
150 TABLET ORAL DAILY
Status: DISCONTINUED | OUTPATIENT
Start: 2022-05-28 | End: 2022-06-08 | Stop reason: HOSPADM

## 2022-05-27 RX ORDER — ROPIVACAINE IN 0.9% SOD CHL/PF 0.1 %
.03-.125 PLASTIC BAG, INJECTION (ML) EPIDURAL CONTINUOUS
Status: DISCONTINUED | OUTPATIENT
Start: 2022-05-27 | End: 2022-06-01

## 2022-05-27 RX ORDER — IOPAMIDOL 755 MG/ML
81 INJECTION, SOLUTION INTRAVASCULAR ONCE
Status: COMPLETED | OUTPATIENT
Start: 2022-05-27 | End: 2022-05-27

## 2022-05-27 RX ORDER — PROPOFOL 10 MG/ML
5-75 INJECTION, EMULSION INTRAVENOUS CONTINUOUS
Status: DISCONTINUED | OUTPATIENT
Start: 2022-05-27 | End: 2022-05-31

## 2022-05-27 RX ORDER — CHLORHEXIDINE GLUCONATE ORAL RINSE 1.2 MG/ML
15 SOLUTION DENTAL 2 TIMES DAILY
Status: DISCONTINUED | OUTPATIENT
Start: 2022-05-27 | End: 2022-06-08 | Stop reason: HOSPADM

## 2022-05-27 RX ORDER — NOREPINEPHRINE BITARTRATE 0.02 MG/ML
INJECTION, SOLUTION INTRAVENOUS
Status: DISCONTINUED
Start: 2022-05-27 | End: 2022-05-27 | Stop reason: HOSPADM

## 2022-05-27 RX ORDER — CARBAMAZEPINE 100 MG/5ML
100 SUSPENSION ORAL DAILY
Status: DISCONTINUED | OUTPATIENT
Start: 2022-05-27 | End: 2022-06-08 | Stop reason: HOSPADM

## 2022-05-27 RX ORDER — NITROGLYCERIN 0.4 MG/1
0.4 TABLET SUBLINGUAL EVERY 5 MIN PRN
Status: DISCONTINUED | OUTPATIENT
Start: 2022-05-27 | End: 2022-06-08 | Stop reason: HOSPADM

## 2022-05-27 RX ORDER — DEXTROSE MONOHYDRATE 25 G/50ML
25-50 INJECTION, SOLUTION INTRAVENOUS
Status: DISCONTINUED | OUTPATIENT
Start: 2022-05-27 | End: 2022-06-08 | Stop reason: HOSPADM

## 2022-05-27 RX ORDER — FENTANYL CITRATE 50 UG/ML
100 INJECTION, SOLUTION INTRAMUSCULAR; INTRAVENOUS ONCE
Status: COMPLETED | OUTPATIENT
Start: 2022-05-27 | End: 2022-05-27

## 2022-05-27 RX ORDER — NICOTINE POLACRILEX 4 MG
15-30 LOZENGE BUCCAL
Status: DISCONTINUED | OUTPATIENT
Start: 2022-05-27 | End: 2022-06-08 | Stop reason: HOSPADM

## 2022-05-27 RX ORDER — MEROPENEM 1 G/1
1 INJECTION, POWDER, FOR SOLUTION INTRAVENOUS EVERY 8 HOURS
Status: DISCONTINUED | OUTPATIENT
Start: 2022-05-27 | End: 2022-05-29

## 2022-05-27 RX ORDER — PROPOFOL 10 MG/ML
INJECTION, EMULSION INTRAVENOUS PRN
Status: DISCONTINUED | OUTPATIENT
Start: 2022-05-27 | End: 2022-05-27

## 2022-05-27 RX ORDER — LIDOCAINE 40 MG/G
CREAM TOPICAL
Status: DISCONTINUED | OUTPATIENT
Start: 2022-05-27 | End: 2022-06-08 | Stop reason: HOSPADM

## 2022-05-27 RX ORDER — ALBUTEROL SULFATE 0.83 MG/ML
SOLUTION RESPIRATORY (INHALATION)
Status: DISCONTINUED
Start: 2022-05-27 | End: 2022-05-27 | Stop reason: HOSPADM

## 2022-05-27 RX ORDER — ALBUTEROL SULFATE 0.83 MG/ML
SOLUTION RESPIRATORY (INHALATION)
Status: COMPLETED
Start: 2022-05-27 | End: 2022-05-27

## 2022-05-27 RX ORDER — METOCLOPRAMIDE HYDROCHLORIDE 5 MG/5ML
10 SOLUTION ORAL
Status: DISCONTINUED | OUTPATIENT
Start: 2022-05-27 | End: 2022-06-08 | Stop reason: HOSPADM

## 2022-05-27 RX ORDER — DEXTROSE MONOHYDRATE 100 MG/ML
INJECTION, SOLUTION INTRAVENOUS CONTINUOUS PRN
Status: DISCONTINUED | OUTPATIENT
Start: 2022-05-27 | End: 2022-06-08 | Stop reason: HOSPADM

## 2022-05-27 RX ORDER — FENTANYL CITRATE 50 UG/ML
INJECTION, SOLUTION INTRAMUSCULAR; INTRAVENOUS
Status: COMPLETED
Start: 2022-05-27 | End: 2022-05-27

## 2022-05-27 RX ORDER — NALOXONE HYDROCHLORIDE 0.4 MG/ML
0.4 INJECTION, SOLUTION INTRAMUSCULAR; INTRAVENOUS; SUBCUTANEOUS
Status: DISCONTINUED | OUTPATIENT
Start: 2022-05-27 | End: 2022-06-08 | Stop reason: HOSPADM

## 2022-05-27 RX ADMIN — CALCIUM CARBONATE 1250 MG: 1250 SUSPENSION ORAL at 20:45

## 2022-05-27 RX ADMIN — Medication 0.12 MCG/KG/MIN: at 12:15

## 2022-05-27 RX ADMIN — SODIUM CHLORIDE 1000 ML: 9 INJECTION, SOLUTION INTRAVENOUS at 08:00

## 2022-05-27 RX ADMIN — SODIUM CHLORIDE, POTASSIUM CHLORIDE, SODIUM LACTATE AND CALCIUM CHLORIDE 500 ML: 600; 310; 30; 20 INJECTION, SOLUTION INTRAVENOUS at 10:42

## 2022-05-27 RX ADMIN — PROPOFOL 30 MCG/KG/MIN: 10 INJECTION, EMULSION INTRAVENOUS at 16:50

## 2022-05-27 RX ADMIN — POTASSIUM & SODIUM PHOSPHATES POWDER PACK 280-160-250 MG 1 PACKET: 280-160-250 PACK at 20:46

## 2022-05-27 RX ADMIN — CARBAMAZEPINE 150 MG: 100 SUSPENSION ORAL at 14:18

## 2022-05-27 RX ADMIN — CARBAMAZEPINE 150 MG: 100 SUSPENSION ORAL at 00:14

## 2022-05-27 RX ADMIN — ALBUTEROL SULFATE 2.5 MG: 2.5 SOLUTION RESPIRATORY (INHALATION) at 19:37

## 2022-05-27 RX ADMIN — ACETYLCYSTEINE 2 ML: 200 SOLUTION ORAL; RESPIRATORY (INHALATION) at 19:36

## 2022-05-27 RX ADMIN — SODIUM BICARBONATE 650 MG TABLET 650 MG: at 20:46

## 2022-05-27 RX ADMIN — ACETYLCYSTEINE 2 ML: 200 SOLUTION ORAL; RESPIRATORY (INHALATION) at 07:31

## 2022-05-27 RX ADMIN — LEVOTHYROXINE SODIUM 150 MCG: 150 TABLET ORAL at 06:24

## 2022-05-27 RX ADMIN — Medication 0.06 MCG/KG/MIN: at 16:52

## 2022-05-27 RX ADMIN — MIDAZOLAM 1 MG: 1 INJECTION INTRAMUSCULAR; INTRAVENOUS at 13:00

## 2022-05-27 RX ADMIN — HYDROCORTISONE SODIUM SUCCINATE 50 MG: 100 INJECTION, POWDER, FOR SOLUTION INTRAMUSCULAR; INTRAVENOUS at 14:33

## 2022-05-27 RX ADMIN — VANCOMYCIN HYDROCHLORIDE 1750 MG: 5 INJECTION, POWDER, LYOPHILIZED, FOR SOLUTION INTRAVENOUS at 08:47

## 2022-05-27 RX ADMIN — FENTANYL CITRATE 50 MCG: 50 INJECTION, SOLUTION INTRAMUSCULAR; INTRAVENOUS at 13:07

## 2022-05-27 RX ADMIN — SODIUM CHLORIDE, POTASSIUM CHLORIDE, SODIUM LACTATE AND CALCIUM CHLORIDE 1000 ML: 600; 310; 30; 20 INJECTION, SOLUTION INTRAVENOUS at 19:12

## 2022-05-27 RX ADMIN — POTASSIUM & SODIUM PHOSPHATES POWDER PACK 280-160-250 MG 1 PACKET: 280-160-250 PACK at 14:33

## 2022-05-27 RX ADMIN — PROPOFOL 100 MG: 10 INJECTION, EMULSION INTRAVENOUS at 11:35

## 2022-05-27 RX ADMIN — SODIUM CHLORIDE 65 ML: 9 INJECTION, SOLUTION INTRAVENOUS at 08:53

## 2022-05-27 RX ADMIN — HYDROCORTISONE SODIUM SUCCINATE 50 MG: 100 INJECTION, POWDER, FOR SOLUTION INTRAMUSCULAR; INTRAVENOUS at 20:44

## 2022-05-27 RX ADMIN — ACETAMINOPHEN 650 MG: 650 SUPPOSITORY RECTAL at 08:30

## 2022-05-27 RX ADMIN — CARBAMAZEPINE 100 MG: 100 SUSPENSION ORAL at 18:42

## 2022-05-27 RX ADMIN — SODIUM CHLORIDE, POTASSIUM CHLORIDE, SODIUM LACTATE AND CALCIUM CHLORIDE 1000 ML: 600; 310; 30; 20 INJECTION, SOLUTION INTRAVENOUS at 09:26

## 2022-05-27 RX ADMIN — PHENYLEPHRINE HYDROCHLORIDE 100 MCG: 10 INJECTION INTRAVENOUS at 11:36

## 2022-05-27 RX ADMIN — BRIVARACETAM 100 MG: 10 SOLUTION ORAL at 20:46

## 2022-05-27 RX ADMIN — PROPOFOL 50 MG: 10 INJECTION, EMULSION INTRAVENOUS at 11:36

## 2022-05-27 RX ADMIN — POTASSIUM & SODIUM PHOSPHATES POWDER PACK 280-160-250 MG 1 PACKET: 280-160-250 PACK at 00:13

## 2022-05-27 RX ADMIN — METOCLOPRAMIDE HYDROCHLORIDE 10 MG: 5 SOLUTION ORAL at 16:23

## 2022-05-27 RX ADMIN — ALBUTEROL SULFATE 2.5 MG: 2.5 SOLUTION RESPIRATORY (INHALATION) at 07:31

## 2022-05-27 RX ADMIN — ALBUTEROL SULFATE 2.5 MG: 2.5 SOLUTION RESPIRATORY (INHALATION) at 15:52

## 2022-05-27 RX ADMIN — VASOPRESSIN 2.4 UNITS/HR: 20 INJECTION INTRAVENOUS at 12:59

## 2022-05-27 RX ADMIN — MEROPENEM 1 G: 1 INJECTION, POWDER, FOR SOLUTION INTRAVENOUS at 16:20

## 2022-05-27 RX ADMIN — ACETYLCYSTEINE 2 ML: 200 SOLUTION ORAL; RESPIRATORY (INHALATION) at 00:17

## 2022-05-27 RX ADMIN — ALBUTEROL SULFATE 2.5 MG: 2.5 SOLUTION RESPIRATORY (INHALATION) at 23:44

## 2022-05-27 RX ADMIN — CARBAMAZEPINE 150 MG: 100 SUSPENSION ORAL at 05:27

## 2022-05-27 RX ADMIN — INSULIN ASPART 9 UNITS: 100 INJECTION, SOLUTION INTRAVENOUS; SUBCUTANEOUS at 20:42

## 2022-05-27 RX ADMIN — ACETYLCYSTEINE 2 ML: 200 SOLUTION ORAL; RESPIRATORY (INHALATION) at 15:52

## 2022-05-27 RX ADMIN — HYDROCORTISONE SODIUM SUCCINATE 100 MG: 100 INJECTION, POWDER, FOR SOLUTION INTRAMUSCULAR; INTRAVENOUS at 08:17

## 2022-05-27 RX ADMIN — METOCLOPRAMIDE HYDROCHLORIDE 10 MG: 5 SOLUTION ORAL at 22:07

## 2022-05-27 RX ADMIN — PROPOFOL 35 MCG/KG/MIN: 10 INJECTION, EMULSION INTRAVENOUS at 12:30

## 2022-05-27 RX ADMIN — PIPERACILLIN SODIUM AND TAZOBACTAM SODIUM 3.38 G: 3; .375 INJECTION, POWDER, LYOPHILIZED, FOR SOLUTION INTRAVENOUS at 05:01

## 2022-05-27 RX ADMIN — SODIUM BICARBONATE 650 MG TABLET 650 MG: at 00:13

## 2022-05-27 RX ADMIN — IOPAMIDOL 81 ML: 755 INJECTION, SOLUTION INTRAVENOUS at 08:53

## 2022-05-27 RX ADMIN — MEROPENEM 2 G: 1 INJECTION, POWDER, FOR SOLUTION INTRAVENOUS at 10:40

## 2022-05-27 ASSESSMENT — ACTIVITIES OF DAILY LIVING (ADL)
ADLS_ACUITY_SCORE: 45
ADLS_ACUITY_SCORE: 49
ADLS_ACUITY_SCORE: 45
ADLS_ACUITY_SCORE: 49

## 2022-05-27 NOTE — PROGRESS NOTES
Arrived to pts room in ICU to place PICC line and was notified by MD that they were in the process of placing art line for pt  and were planning on placing a CVC also, so PICC not necessary at this time. Will complete VAT consult. Please re-consult VAT if further services needed.

## 2022-05-27 NOTE — PLAN OF CARE
RRT called at 0747 for possible severe sepsis.  Dr Meier also notified.    BP 76/47, HR 120s, temp 101.6. maintaining O2 99% on room air. Patient is alert but non-verbal.     Update at 1300:   Patient transferred to ICU with house NP, Flying do RN, and RT around 1220. Gave report to NANCI Valdivia.    Comments:  Kelli do RN remained with Patient pending transfer to IMC vs ICU. Refer to house NP's noted for RRT work up. Pt's mother/guardian notified by house NP.

## 2022-05-27 NOTE — PROVIDER NOTIFICATION
Both House Aydee JAUREGUI and Hospitalist Dr Meier notified at 1046 for     Positive blood culture of gram positive cocci. Blood colleted on 5/26      Awaiting return call:

## 2022-05-27 NOTE — CODE/RAPID RESPONSE
[facility]    RRT Note  5/27/2022   Time Called: 0758    RRT called for: Hypotension    Assessment & Plan   IMPRESSION & PLAN:    Keyon Farias is a 61 year old male w/ PMH of TBI with dysphagia status post PEG tube, seizure disorder, panhypopituitarism who was admitted on 5/26/2022 for cystitis.  He received 1 L of IV fluids and broad-spectrum antimicrobials in the emergency department.    On the a.m. of 5/27/2022 patient developed hypotension.  Rapid response was activated.  I deferred initial evaluation to the hospitalist attending physician who was present at bedside.  I subsequently assumed care of the rapid response as it was likely to be a more involved RRT and hypotension was not rapidly correcting.  Tachycardia had been noted by respiratory therapist during neb treatment.  This was reported to RN, BP was later checked and found to be in the 70s systolic.  On my arrival SBP was up to the 90s from a wu of 65 SPO2 95% on room air, temp 101.9 axillary, respiratory rate 30.  There is minimal verbalization at baseline he did show me thumbs up on the left.  He is not diaphoretic there is no mottling.  He is tachycardic with heart rate in the 130s..    I personally reviewed the radiographs see below    VS: As above  WBC 28.5  immuncompromised yes secondary to chronic steroid use    Impression  Likely sepsis/septic shock versus adrenal insufficiency  Differential diagnosis:  -sirs critieria 4 for WBC, HR, RR and temp, qsofa 2 for RR and BP  -Considered other etiologies such as PE and ACS however these are less likely with other supporting data that more strongly favor sepsis    INTERVENTIONS:  -IVF bolus NS 0.9% x1 L  -2nd liter IVF bolus w/ LR  -stat LA, bld cx  -place 2nd IV access  -stat ct c/a/p--> no clear etiology aside from bibasilar pulmonary infiltrates  -change zosyn to meropenem  -add vancomycin  -stress dose steroids w/ hydrocortisone 100mg iv q8h  -stat ekg--> ST, normal  "axis, nonischemic    CT was completed and patient was returned to his room at approximately 9:30 AM.  He was awaiting transfer to [facility] [place].  [facility] remained with patient's pending his transfer.  The above fluid boluses completed, both antimicrobials had been administered.  Max BPs were 1 teens to 120 but when IV fluids stopped he had recurrent hypotension.    I reevaluated patient at approximately 11 AM.  His level of consciousness was decreased from earlier in the day GCS now 7-8, opening eyes to noxious stimuli such as oral suctioning.  He is no longer following commands.  Consistently hypotensive with MAP less than 65.  Capillary refill is brisk.  Additionally 1 out of 2 bottles of blood cultures is positive for GPC's  -500 mL additional IV fluids given as SBP's again in the 70s systolic  -Norepinephrine infusion was started with escalating doses for ongoing shock state.  - I called patient's mother to update her on change in his clinical condition over the course of the morning.  She continues to desire aggressive resuscitative measures.  She is hopeful to bring him home says she \"cannot let her son go and needs him in her life\".  -Admit arrangements to transfer patient to ICU rather than C.  -Just prior to transferring patient to ICU with ICU RN and student NP patient became hypoxic with wu SPO2 80%.  -Jaw thrust was performed, patient bag-valve-mask supported and anesthesia was paged overhead for emergent intubation.  -  Patient was intubated at 11:50 AM by CRNA with 8.0 ET tube, 24 cm at the teeth.  He received 150 mg IV propofol and 200 mcg phenylephrine IV push and appearing intubation.  -Patient was transferred to intensive care with RT for BVM support, ICU RN, NP student, myself.    Working diagnosis:   Multiorgan failure secondary to septic shock with septic encephalopathy, CNS failure, acute kidney injury and acute hypoxic resp failure    At the end of the RRT above imaging " has been completed, he has been intubated, he is on escalating doses of norepinephrine, vasopressin has been ordered not yet started.  Sedation started with propofol infusion.  Mother updated on change in condition by me    disposition ICU    Discussed with and defer further cares to hospitalist attending physician Dr. Meier and intensivist attending physician Dr. Wei    Code Status: Full Code    Allergies   Allergies   Allergen Reactions     Valproic Acid Other (See Comments)     Toxicity w/ bone marrow suspension, elevated ammonia levels      Dilantin [Phenytoin Sodium] Other (See Comments)     Severe Trembling     Scopolamine Hives     Hives with the patch - oral no problem       Physical Exam   Vital Signs with Ranges:  Temp:  [99.1  F (37.3  C)-101.6  F (38.7  C)] 99.3  F (37.4  C)  Pulse:  [] 80  Resp:  [9-27] 17  BP: ()/(39-74) 97/54  SpO2:  [86 %-100 %] 100 %  I/O last 3 completed shifts:  In: 1465 [NG/GT:365; IV Piggyback:1100]  Out: 375 [Urine:375]    Constitutional: vs as above  General:  adult pt lying in bed ill appearing  Neuro: +follows commands to show thumbs up on the left, aphasic, moans on repeat exam responsive only with eye-opening to noxious stimuli including oral suctioning  Head, ENT & mouth: NC/AT,  Dry oral mucosa  Neck: supple  CV S1S2  resp: weak cough unless stimulated w/ oropharyngeal suctioning, tachypneic  gi:normoactive bowel sounds, soft, nontender, nondisteded, peg in LUQ  Ext: no edema or mottling, CR <2 sec  Skin: no rashes on exposed skin  Lymph: defer  Musculoskeletal no bony joint deformities      Data     EKG:  Interpreted by NATHANIEL Cooper CNP  Time reviewed: 0915  Symptoms at time of EKG: hypotensive   Rhythm: sinus tachycardia  Rate: Tachycardia  Axis: Normal  Ectopy: none  Conduction: normal  ST Segments/ T Waves: No acute ischemic changes  Q Waves: none    ABG:  -  Recent Labs   Lab 05/26/22  1519   PH 7.42       IMAGING: (X-ray/CT/MRI)   Recent  Results (from the past 24 hour(s))   Chest XR,  PA & LAT    Narrative    CHEST TWO VIEWS 5/26/2022 4:22 PM     HISTORY: Fever    COMPARISON: 5/11/2022    FINDINGS: Heart size and pulmonary vascularity are within normal  limits. The lungs are clear. No pneumothorax or pleural effusion.       Impression    IMPRESSION: No radiographic evidence of acute chest abnormality.     ANNETTE CASTRO MD         SYSTEM ID:  [id number]   CT Chest/Abdomen/Pelvis w Contrast    Narrative    CT CHEST/ABDOMEN/PELVIS WITH CONTRAST  5/27/2022 9:18 AM    CLINICAL HISTORY: Abdominal pain, fever.    TECHNIQUE: CT scan of the chest, abdomen, and pelvis was performed  following injection of IV contrast. Multiplanar reformats were  obtained. Dose reduction techniques were used.   CONTRAST: 81 mL Isovue 370    COMPARISON: Abdomen and pelvis CT from December 6, 2021.    FINDINGS:   LUNGS AND PLEURA: Question some basilar infiltrates vs. artifact of  breathing motion. No effusions.    MEDIASTINUM/AXILLAE: A few mildly prominent lymph nodes are noted in  the subcarinal region and probably the right hilum which are  nonspecific and may be reactive. No aneurysm. No coronary artery  calcifications.    HEPATOBILIARY: No significant mass or bile duct dilatation. No  calcified gallstones.     PANCREAS: 2.2 cm cystic lesion in the pancreatic tail not  significantly changed from previous. Pancreas otherwise unremarkable.    SPLEEN: Normal size.    ADRENAL GLANDS: No significant nodules.    KIDNEYS/BLADDER: No significant mass, stones, or hydronephrosis.    BOWEL: No obstruction or inflammatory change.    PELVIC ORGANS: No pelvic masses.    ADDITIONAL FINDINGS: No ascites.    MUSCULOSKELETAL: Unremarkable.      Impression    IMPRESSION:  1.  Possible basilar infiltrates vs. artifact of breathing motion at  the lung bases.  2.  No definite cause for fever and abdominal pain demonstrated in the  abdomen or pelvis.  3.  Stable cystic lesion in the  pancreas.    BERT ESPINOZA MD         SYSTEM ID:  [id number]       CBC with Diff:  Recent Labs   Lab Test 05/27/22  0650 05/09/22  1122 12/27/21  1630   WBC 28.5*   < > 9.6   HGB 14.0   < > 14.0   MCV 93   < > 93      < > 200   INR  --   --  1.28*    < > = values in this interval not displayed.        Lactic Acid:    Lab Results   Component Value Date    LACT 1.8 05/26/2022    LACT 2.1 05/23/2021           Comprehensive Metabolic Panel:  Recent Labs   Lab 05/27/22  0835 05/27/22  0802 05/27/22  0650   NA  --   --  135   POTASSIUM  --   --  3.4   CHLORIDE  --   --  100   CO2  --   --  27   ANIONGAP  --   --  8   GLC  --  89 105*   BUN  --   --  25   CR  --   --  1.57*   GFRESTIMATED  --   --  50*   STEVE  --   --  8.0*   PROTTOTAL 6.4*  --   --    ALBUMIN 2.5*  --   --    BILITOTAL 1.7*  --   --    ALKPHOS 106  --   --    AST 50*  --   --    ALT 26  --   --        UA:  Recent Labs   Lab 05/26/22  1742   COLOR Yellow   APPEARANCE Slightly Cloudy*   URINEGLC Negative   URINEBILI Negative   URINEKETONE Negative   SG 1.029   UBLD Negative   URINEPH 8.5*   PROTEIN 30 *   NITRITE Positive*   LEUKEST Large*   RBCU 4*   WBCU 180*       Time Spent on this Encounter   I spent 170 minutes (0755 - 0930, 11am-1215pm) of critical care time on the unit/floor managing the care of Bert Farias. Upon evaluation, this patient had a high probability of imminent or life-threatening deterioration due to septic shock and acute hypoxic resp failure, which required my direct attention, intervention, and personal management including volume resuscitating, review of ekg, starting vasopressors, coordinating endotracheal intubation 100% of my time was spent at the bedside counseling the patient and/or coordinating care regarding services listed in this note.    Joy Miramontes, [facility]  [phone]

## 2022-05-27 NOTE — ANESTHESIA PROCEDURE NOTES
Airway       Patient location during procedure: Floor       Procedure Start/Stop Times: 5/27/2022 11:30 AM  Staff -        CRNA: Raissa Bedoya APRN CRNA       Other Anesthesia Staff: Mark Lowery APRN CRNA       Performed By: CRNA  Consent for Airway        Urgency: emergent       Consent: The procedure was performed in an emergent situation.  Report Obtained from Primary Care Team       History regarding most recent potassium obtained: Yes       History regarding presence/absence of renal failure obtained:Yes       History regarding stroke/CVA obtained:Yes       History regarding presence/absence of NM disorder: YesIndications and Patient Condition       Indications for airway management: respiratory insufficiency       Induction type:intravenous       Mask difficulty assessment: 2 - vent by mask + OA or adjuvant +/- NMBA    Final Airway Details       Final airway type: endotracheal airway       Successful airway: ETT - single and Single subglottic suction  Endotracheal Airway Details        ETT size (mm): 8.0       Cuffed: yes       Successful intubation technique: video laryngoscopy       VL Blade Size: Chilel 4       Grade View of Cords: 1       Adjucts: stylet       Position: Right       Measured from: gums/teeth       Secured at (cm): 23       Bite block used: None    Post intubation assessment        Placement verified by: capnometry, equal breath sounds and chest rise        Number of attempts at approach: 1       Number of other approaches attempted: 0       Secured with: commercial tube lundberg       Ease of procedure: easy       Dentition: Intact and Unchanged (Very poor dentition per baseline; multiple missing nd loose teeth)    Medication(s) Administered   Medication Administration Time: 5/27/2022 11:30 AM

## 2022-05-27 NOTE — ANESTHESIA CARE TRANSFER NOTE
Patient: Keyon Farias    Procedure: * No procedures listed *       Diagnosis: * No pre-op diagnosis entered *  Diagnosis Additional Information: No value filed.    Anesthesia Type:   No value filed.     Note:    Oropharynx: endotracheal tube in place  Level of Consciousness: iatrogenic sedation  Patient oxygen source: AMBU ASSIST.  Level of Supplemental Oxygen (L/min / FiO2): 15  Independent Airway: airway patency not satisfactory and stable  Dentition: dentition unchanged  Vital Signs Stable: post-procedure vital signs reviewed and stable  Report to RN Given: handoff report given  Patient transferred to: ICU  Comments: Diagnosis: Respiratory Arrest  Procedure: Intubation  Ordering Physician: NATHANIEL Murphy  Location: [facility]  ICU Handoff: Call for PAUSE to initiate/utilize ICU HANDOFF, Identified Patient, Identified Responsible Provider, Reviewed the Pertinent Medical History, Discussed Surgical Course, Reviewed Intra-OP Anesthesia Management and Issues during Anesthesia, Set Expectations for Post Procedure Period and Allowed Opportunity for Questions and Acknowledgement of Understanding      Vitals:  Vitals Value Taken Time   /71 05/27/22 1221   Temp     Pulse 108 05/27/22 1224   Resp 17 05/27/22 1224   SpO2 100 % 05/27/22 1224   Vitals shown include unvalidated device data.    Electronically Signed By: NATHANIEL Tam CRNA  May 27, 2022  12:25 PM

## 2022-05-27 NOTE — PROGRESS NOTES
Intubation Note    Date of intubation: 5/27/2022      Location of intubation: Room 800  Intubated by: CRNA  Size of ETT: 8.0  Location (cm) at lip: 24    ETT placement confirmed by: ETCO2, chest xray, bilateral breath sounds  Comments: RT will continue to follow and monitor.         Pt was transported to ICU Room 346 after intubation. Patient was bagged from 8th floor to ICU.     BP 97/54   Pulse 80   Temp 99.3  F (37.4  C) (Axillary)   Resp 17   Wt 73.6 kg (162 lb 4.8 oz)   SpO2 100%   BMI 22.01 kg/m

## 2022-05-27 NOTE — PROGRESS NOTES
Brief hospitalist progress note    RRT called this AM due to septic shock.  Transferred to ICU and subsequently intubated.  Hospitalist service will sign off, please call when appropriate for transfer.

## 2022-05-27 NOTE — PROGRESS NOTES
[facility] ICU RESPIRATORY NOTE        Date of Admission: 5/26/2022    Date of Intubation (most recent): 5/27/2022    Reason for Mechanical Ventilation: AW protection     Number of Days on Mechanical Ventilation: 1    Met Criteria for Spontaneous Breathing Trial: Yes    Significant Events Today: Pt was intubated today for AW protection.    ABG Results:   Recent Labs   Lab 05/27/22  1724 05/27/22  1425 05/26/22  1519   PH  --  7.49* 7.42   PCO2  --  24*  --    PO2  --  178*  --    HCO3  --  18*  --    O2PER 40 40  --        Current Vent Settings: Vent Mode: CMV/AC  (Continuous Mandatory Ventilation/ Assist Control)  FiO2 (%): 40 %  Resp Rate (Set): 12 breaths/min  Tidal Volume (Set, mL): 500 mL  PEEP (cm H2O): 5 cmH2O  Resp: 12        Plan: Continue full ventilator support and assess in the morning for daily weaning trial.    Steven Padilla, RT  5/27/2022
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
## 2022-05-27 NOTE — PROGRESS NOTES
RECEIVING UNIT ED HANDOFF REVIEW    ED Nurse Handoff Report was reviewed by: Jody Fontana RN on May 26, 2022 at 8:49 PM

## 2022-05-27 NOTE — PLAN OF CARE
Goal Outcome Evaluation:        Pt arrived from ED at 2130. Bradypneic and soft BP at times. Had 100.8F temp in the evening, tylenol given w/ good results. Baseline non-verbal, but alert and responsive w/ one word answers/thumbs up. No nonverbal signs of pain. T/R Q2. NPO, meds taken through Gtube. Incont b/b, external cath in place, no BM this shift. Noted blanchable redness on coccyx, barrier cream applied. Triggered sepsis protocol, lactic 1.8. PIV SL w/ int abx. Mom, Savannah will be here in this morning. Discharge pending.

## 2022-05-27 NOTE — CONSULTS
CLINICAL NUTRITION SERVICES  -  ASSESSMENT NOTE      Recommendations Ordered by Registered Dietitian (RD):     When ok to resume TF, rec:  Type of Feeding Tube: GJ tube  Enteral Frequency:  Continuous  Enteral Regimen: Jevity 1.5 @ 60 mL/hr x 22 hrs (TF held 1 hr before and 1 hr after synthroid dose)  Total Enteral Provisions: 1980 cals (27 cals/kg), 90 gm pro (1.2 gm/kg), 28 gm fiber, 1003 mL free water  Free Water Flush: 60 mL every 4 hrs (for tube patency)    Will adjust FWF as needed     Malnutrition:   % Weight Loss:  None noted - wt appears stable in the 160s  % Intake:  No decreased intake noted  Subcutaneous Fat Loss:  (5/12/22 - RD assessment) Orbital region moderate depletion- baseline  Muscle Loss:  (5/12/22 - RD Assessment) Temporal region moderate depletion and Clavicle bone region moderate depletion - baseline  Fluid Retention:  None noted    Malnutrition Diagnosis: Patient does not meet two of the above criteria necessary for diagnosing malnutrition          REASON FOR ASSESSMENT  Keyon Farias is a 61 year old male seen by Registered Dietitian for Provider Order - Registered Dietitian to Assess and Order TF per Medical Nutrition Therapy Protocol      NUTRITION HISTORY  Chart reviewed  Pt is well known to our service  Mother is caregiver  Pt does not take anything orally  - all nutrition is through the GJ tube  5/12: Recent discharge from [facility] on:  Jevity 1.5 at 60 mL/hr x 22 hours per day (hold TF 1 hour before and 1 hour after Synthroid administration) - 1980 kcal (27 kcal/kg), 84 g protein (1.2 g/kg), 1003 mL H2O, 284 g CHO, 27 g fiber     Unable to see pt  RRT just called and pt being taken off unit        CURRENT NUTRITION ORDERS  Diet Order:     NPO      NUTRITION FOCUSED PHYSICAL ASSESSMENT FOR DIAGNOSING MALNUTRITION)    No:  RRT - pt being taken off unit             (5/12/22) RD Assessment:  Subcutaneous Fat Loss:  Orbital region moderate depletion- baseline  Muscle Loss:  Temporal region  "moderate depletion and Clavicle bone region moderate depletion - baseline        Obtained from Chart/Interdisciplinary Team:  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  Weight Status:  Normal BMI  IBW: 80.9 kg  % IBW: 91%  Weight History:   Wt Readings from Last 10 Encounters:   05/27/22 73.6 kg (162 lb 4.8 oz)   05/11/22 68.1 kg (150 lb 3.2 oz)   03/18/22 72.6 kg (160 lb)   12/11/21 75.2 kg (165 lb 12.6 oz)   11/17/21 73.6 kg (162 lb 3.2 oz)   10/30/21 75.2 kg (165 lb 12.6 oz)   08/10/21 78.8 kg (173 lb 11.6 oz)   05/24/21 80.1 kg (176 lb 8 oz)   04/16/21 76 kg (167 lb 8.8 oz)   02/22/21 74.8 kg (165 lb)         LABS  5/27: Na 135           K 3.4    MEDICATIONS  Synthroid (need to hold TF for 1 hr before and 1 hr after dose)  Reglan  Multivitamin with minerals  Neutra-Phos  Vit C  Vit D3      ASSESSED NUTRITION NEEDS PER APPROVED PRACTICE GUIDELINES:    Dosing Weight 73.6 kg  Estimated Energy Needs: 7342-7543 kcals (25-30 Kcal/Kg)  Justification: maintenance  Estimated Protein Needs:  grams protein (1.2-1.5 g pro/Kg)  Justification: maintenance  Estimated Fluid Needs: 1 mL/kcal  Justification: maintenance    MALNUTRITION:  % Weight Loss:  None noted - wt appears stable in the 160s  % Intake:  No decreased intake noted  Subcutaneous Fat Loss:  (5/12/22 - RD assessment) Orbital region moderate depletion- baseline  Muscle Loss:  (5/12/22 - RD Assessment) Temporal region moderate depletion and Clavicle bone region moderate depletion - baseline  Fluid Retention:  None noted    Malnutrition Diagnosis: Patient does not meet two of the above criteria necessary for diagnosing malnutrition      NUTRITION DIAGNOSIS:  Inadequate protein-energy intake related to NPO status and TF not resumed yet as evidenced by pt notmemeting estimated needs      NUTRITION " INTERVENTIONS  Recommendations / Nutrition Prescription  NPO    When ok to resume TF, rec:  Type of Feeding Tube: GJ tube  Enteral Frequency:  Continuous  Enteral Regimen: Jevity 1.5 @ 60 mL/hr x 22 hrs (TF held 1 hr before and 1 hr after synthroid dose)  Total Enteral Provisions: 1980 cals (27 cals/kg), 90 gm pro (1.2 gm/kg), 28 gm fiber, 1003 mL free water  Free Water Flush: 60 mL every 4 hrs (for tube patency)    Will adjust FWF as needed    .      Implementation  Nutrition education: Not appropriate at this time due to patient condition  EN Composition and EN Schedule: orders entered in EPIC  .      Nutrition Goals  EN to meet % estimated needs  .      MONITORING AND EVALUATION:  Progress towards goals will be monitored and evaluated per protocol and Practice Guidelines

## 2022-05-27 NOTE — PROCEDURES
Procedure: Central Line Placement  Consent:   1. Obtained from mom after discussion of risk/benefit/alternatives) and all questions answered to the best of my knowledge. This signed consent is in the chart.     Universal Protocol:   Patient Identification was verified, time out was performed, site marking N/A, Imaging data N/A. Full Barrier precaution done: Hands washed, mask, gloves, gown, cap and eye protection all used.    Diagnosis: septic shock  Indication: vasopressors    Narrative: The right femoral vein was identified with portable ultrasound device. Area prepped with chlorhexedine and Patient was head to toe draped. Sterile technique and full barrier precautions used. 1% lidocaine injected subcutaneously for local anesthesia. A triple lumen catheter was inserted using standard Seldinger technique under real time US guidance after careful evaluation of the best site to minimize risk of infection and complication related to insertion. The central line was sutured at 20 cm depth. Please note that arterial line was attempted on same side but unable to pass wire so procedure aborted.      Post-procedure imaging:  Central line is in good position and no visible pneumothorax per my review.     Complications: No apparent complication.    Procedure performed by Dr Wei on May 27, 2022
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
## 2022-05-27 NOTE — PROVIDER NOTIFICATION
MD Notification    Notified Person: MD    Notified Person Name: Zhou    Notification Date/Time: 05/26/22 10:00 PM      Notification Interaction: Amcom page    Purpose of Notification: FYI Pt arrived w/ new temp of 100.8.     Orders Received: Continue to monitor w/ abx scheduled    Comments:

## 2022-05-28 LAB
ANION GAP SERPL CALCULATED.3IONS-SCNC: 10 MMOL/L (ref 3–14)
ANION GAP SERPL CALCULATED.3IONS-SCNC: 5 MMOL/L (ref 3–14)
BACTERIA UR CULT: NORMAL
BASOPHILS # BLD AUTO: 0.1 10E3/UL (ref 0–0.2)
BASOPHILS NFR BLD AUTO: 0 %
BUN SERPL-MCNC: 14 MG/DL (ref 7–30)
BUN SERPL-MCNC: 17 MG/DL (ref 7–30)
CALCIUM SERPL-MCNC: 7.6 MG/DL (ref 8.5–10.1)
CALCIUM SERPL-MCNC: 8 MG/DL (ref 8.5–10.1)
CHLORIDE BLD-SCNC: 110 MMOL/L (ref 94–109)
CHLORIDE BLD-SCNC: 128 MMOL/L (ref 94–109)
CO2 SERPL-SCNC: 24 MMOL/L (ref 20–32)
CO2 SERPL-SCNC: 25 MMOL/L (ref 20–32)
CREAT SERPL-MCNC: 1 MG/DL (ref 0.66–1.25)
CREAT SERPL-MCNC: 1.08 MG/DL (ref 0.66–1.25)
EOSINOPHIL # BLD AUTO: 0 10E3/UL (ref 0–0.7)
EOSINOPHIL NFR BLD AUTO: 0 %
ERYTHROCYTE [DISTWIDTH] IN BLOOD BY AUTOMATED COUNT: 13.8 % (ref 10–15)
GFR SERPL CREATININE-BSD FRML MDRD: 79 ML/MIN/1.73M2
GFR SERPL CREATININE-BSD FRML MDRD: 87 ML/MIN/1.73M2
GLUCOSE BLD-MCNC: 272 MG/DL (ref 70–99)
GLUCOSE BLD-MCNC: 356 MG/DL (ref 70–99)
GLUCOSE BLDC GLUCOMTR-MCNC: 183 MG/DL (ref 70–99)
GLUCOSE BLDC GLUCOMTR-MCNC: 188 MG/DL (ref 70–99)
GLUCOSE BLDC GLUCOMTR-MCNC: 214 MG/DL (ref 70–99)
GLUCOSE BLDC GLUCOMTR-MCNC: 221 MG/DL (ref 70–99)
GLUCOSE BLDC GLUCOMTR-MCNC: 245 MG/DL (ref 70–99)
GLUCOSE BLDC GLUCOMTR-MCNC: 254 MG/DL (ref 70–99)
GLUCOSE BLDC GLUCOMTR-MCNC: 261 MG/DL (ref 70–99)
GLUCOSE BLDC GLUCOMTR-MCNC: 277 MG/DL (ref 70–99)
GLUCOSE BLDC GLUCOMTR-MCNC: 290 MG/DL (ref 70–99)
GLUCOSE BLDC GLUCOMTR-MCNC: 329 MG/DL (ref 70–99)
GLUCOSE BLDC GLUCOMTR-MCNC: 347 MG/DL (ref 70–99)
GLUCOSE BLDC GLUCOMTR-MCNC: 358 MG/DL (ref 70–99)
GLUCOSE BLDC GLUCOMTR-MCNC: 362 MG/DL (ref 70–99)
GLUCOSE BLDC GLUCOMTR-MCNC: 364 MG/DL (ref 70–99)
GLUCOSE BLDC GLUCOMTR-MCNC: 375 MG/DL (ref 70–99)
HCT VFR BLD AUTO: 35.4 % (ref 40–53)
HGB BLD-MCNC: 11.2 G/DL (ref 13.3–17.7)
IMM GRANULOCYTES # BLD: 0.2 10E3/UL
IMM GRANULOCYTES NFR BLD: 1 %
LACTATE SERPL-SCNC: 3.8 MMOL/L (ref 0.7–2)
LACTATE SERPL-SCNC: 6.6 MMOL/L (ref 0.7–2)
LYMPHOCYTES # BLD AUTO: 0.8 10E3/UL (ref 0.8–5.3)
LYMPHOCYTES NFR BLD AUTO: 4 %
MAGNESIUM SERPL-MCNC: 3.2 MG/DL (ref 1.6–2.3)
MCH RBC QN AUTO: 29.8 PG (ref 26.5–33)
MCHC RBC AUTO-ENTMCNC: 31.6 G/DL (ref 31.5–36.5)
MCV RBC AUTO: 94 FL (ref 78–100)
MONOCYTES # BLD AUTO: 1.1 10E3/UL (ref 0–1.3)
MONOCYTES NFR BLD AUTO: 6 %
NEUTROPHILS # BLD AUTO: 18 10E3/UL (ref 1.6–8.3)
NEUTROPHILS NFR BLD AUTO: 89 %
NRBC # BLD AUTO: 0 10E3/UL
NRBC BLD AUTO-RTO: 0 /100
PLATELET # BLD AUTO: 144 10E3/UL (ref 150–450)
POTASSIUM BLD-SCNC: 2.1 MMOL/L (ref 3.4–5.3)
POTASSIUM BLD-SCNC: 2.4 MMOL/L (ref 3.4–5.3)
POTASSIUM BLD-SCNC: 4.5 MMOL/L (ref 3.4–5.3)
RBC # BLD AUTO: 3.76 10E6/UL (ref 4.4–5.9)
SODIUM SERPL-SCNC: 144 MMOL/L (ref 133–144)
SODIUM SERPL-SCNC: 158 MMOL/L (ref 133–144)
VANCOMYCIN SERPL-MCNC: 9.2 MG/L
WBC # BLD AUTO: 20.2 10E3/UL (ref 4–11)

## 2022-05-28 PROCEDURE — 999N000253 HC STATISTIC WEANING TRIALS

## 2022-05-28 PROCEDURE — 250N000011 HC RX IP 250 OP 636: Performed by: NURSE PRACTITIONER

## 2022-05-28 PROCEDURE — 999N000009 HC STATISTIC AIRWAY CARE

## 2022-05-28 PROCEDURE — 93005 ELECTROCARDIOGRAM TRACING: CPT

## 2022-05-28 PROCEDURE — 83735 ASSAY OF MAGNESIUM: CPT | Performed by: INTERNAL MEDICINE

## 2022-05-28 PROCEDURE — 83605 ASSAY OF LACTIC ACID: CPT | Performed by: INTERNAL MEDICINE

## 2022-05-28 PROCEDURE — 250N000009 HC RX 250: Performed by: HOSPITALIST

## 2022-05-28 PROCEDURE — 250N000009 HC RX 250: Performed by: NURSE PRACTITIONER

## 2022-05-28 PROCEDURE — 250N000009 HC RX 250: Performed by: INTERNAL MEDICINE

## 2022-05-28 PROCEDURE — 80048 BASIC METABOLIC PNL TOTAL CA: CPT | Performed by: INTERNAL MEDICINE

## 2022-05-28 PROCEDURE — 258N000003 HC RX IP 258 OP 636: Performed by: HOSPITALIST

## 2022-05-28 PROCEDURE — 85025 COMPLETE CBC W/AUTO DIFF WBC: CPT | Performed by: SURGERY

## 2022-05-28 PROCEDURE — 99233 SBSQ HOSP IP/OBS HIGH 50: CPT | Performed by: INTERNAL MEDICINE

## 2022-05-28 PROCEDURE — 94640 AIRWAY INHALATION TREATMENT: CPT | Mod: 76

## 2022-05-28 PROCEDURE — 250N000011 HC RX IP 250 OP 636: Performed by: HOSPITALIST

## 2022-05-28 PROCEDURE — 250N000013 HC RX MED GY IP 250 OP 250 PS 637: Performed by: HOSPITALIST

## 2022-05-28 PROCEDURE — 80202 ASSAY OF VANCOMYCIN: CPT | Performed by: HOSPITALIST

## 2022-05-28 PROCEDURE — 258N000003 HC RX IP 258 OP 636: Performed by: INTERNAL MEDICINE

## 2022-05-28 PROCEDURE — 94003 VENT MGMT INPAT SUBQ DAY: CPT

## 2022-05-28 PROCEDURE — 82310 ASSAY OF CALCIUM: CPT | Performed by: SURGERY

## 2022-05-28 PROCEDURE — 250N000013 HC RX MED GY IP 250 OP 250 PS 637

## 2022-05-28 PROCEDURE — 94640 AIRWAY INHALATION TREATMENT: CPT

## 2022-05-28 PROCEDURE — 93010 ELECTROCARDIOGRAM REPORT: CPT | Performed by: INTERNAL MEDICINE

## 2022-05-28 PROCEDURE — 84132 ASSAY OF SERUM POTASSIUM: CPT | Performed by: INTERNAL MEDICINE

## 2022-05-28 PROCEDURE — 250N000013 HC RX MED GY IP 250 OP 250 PS 637: Performed by: SURGERY

## 2022-05-28 PROCEDURE — 250N000012 HC RX MED GY IP 250 OP 636 PS 637: Performed by: INTERNAL MEDICINE

## 2022-05-28 PROCEDURE — 258N000002 HC RX IP 258 OP 250: Performed by: INTERNAL MEDICINE

## 2022-05-28 PROCEDURE — 250N000011 HC RX IP 250 OP 636: Performed by: SURGERY

## 2022-05-28 PROCEDURE — 250N000013 HC RX MED GY IP 250 OP 250 PS 637: Performed by: INTERNAL MEDICINE

## 2022-05-28 PROCEDURE — 250N000011 HC RX IP 250 OP 636: Performed by: INTERNAL MEDICINE

## 2022-05-28 PROCEDURE — 3E043XZ INTRODUCTION OF VASOPRESSOR INTO CENTRAL VEIN, PERCUTANEOUS APPROACH: ICD-10-PCS | Performed by: INTERNAL MEDICINE

## 2022-05-28 PROCEDURE — 999N000157 HC STATISTIC RCP TIME EA 10 MIN

## 2022-05-28 PROCEDURE — 200N000001 HC R&B ICU

## 2022-05-28 RX ORDER — NICOTINE POLACRILEX 4 MG
15-30 LOZENGE BUCCAL
Status: DISCONTINUED | OUTPATIENT
Start: 2022-05-28 | End: 2022-05-28

## 2022-05-28 RX ORDER — POTASSIUM CHLORIDE 29.8 MG/ML
20 INJECTION INTRAVENOUS ONCE
Status: COMPLETED | OUTPATIENT
Start: 2022-05-28 | End: 2022-05-28

## 2022-05-28 RX ORDER — MAGNESIUM SULFATE HEPTAHYDRATE 40 MG/ML
2 INJECTION, SOLUTION INTRAVENOUS ONCE
Status: COMPLETED | OUTPATIENT
Start: 2022-05-28 | End: 2022-05-28

## 2022-05-28 RX ORDER — DEXTROSE MONOHYDRATE 100 MG/ML
INJECTION, SOLUTION INTRAVENOUS CONTINUOUS PRN
Status: DISCONTINUED | OUTPATIENT
Start: 2022-05-28 | End: 2022-06-08 | Stop reason: HOSPADM

## 2022-05-28 RX ORDER — POTASSIUM CHLORIDE 20MEQ/15ML
60 LIQUID (ML) ORAL 3 TIMES DAILY
Status: DISCONTINUED | OUTPATIENT
Start: 2022-05-28 | End: 2022-05-28

## 2022-05-28 RX ORDER — SODIUM CHLORIDE 450 MG/100ML
INJECTION, SOLUTION INTRAVENOUS CONTINUOUS
Status: DISCONTINUED | OUTPATIENT
Start: 2022-05-28 | End: 2022-05-29

## 2022-05-28 RX ORDER — POTASSIUM CHLORIDE 29.8 MG/ML
20 INJECTION INTRAVENOUS
Status: DISCONTINUED | OUTPATIENT
Start: 2022-05-28 | End: 2022-05-28

## 2022-05-28 RX ORDER — SODIUM CHLORIDE 9 MG/ML
INJECTION, SOLUTION INTRAVENOUS CONTINUOUS
Status: DISCONTINUED | OUTPATIENT
Start: 2022-05-28 | End: 2022-05-29

## 2022-05-28 RX ORDER — DEXTROSE MONOHYDRATE 25 G/50ML
25-50 INJECTION, SOLUTION INTRAVENOUS
Status: DISCONTINUED | OUTPATIENT
Start: 2022-05-28 | End: 2022-05-28

## 2022-05-28 RX ORDER — SODIUM CHLORIDE AND POTASSIUM CHLORIDE 150; 450 MG/100ML; MG/100ML
INJECTION, SOLUTION INTRAVENOUS CONTINUOUS
Status: DISCONTINUED | OUTPATIENT
Start: 2022-05-28 | End: 2022-05-28

## 2022-05-28 RX ORDER — POTASSIUM CHLORIDE 29.8 MG/ML
20 INJECTION INTRAVENOUS
Status: COMPLETED | OUTPATIENT
Start: 2022-05-28 | End: 2022-05-28

## 2022-05-28 RX ADMIN — BRIVARACETAM 100 MG: 10 SOLUTION ORAL at 07:49

## 2022-05-28 RX ADMIN — POTASSIUM CHLORIDE AND SODIUM CHLORIDE: 450; 150 INJECTION, SOLUTION INTRAVENOUS at 14:38

## 2022-05-28 RX ADMIN — MEROPENEM 1 G: 1 INJECTION, POWDER, FOR SOLUTION INTRAVENOUS at 08:51

## 2022-05-28 RX ADMIN — ALBUTEROL SULFATE 2.5 MG: 2.5 SOLUTION RESPIRATORY (INHALATION) at 15:08

## 2022-05-28 RX ADMIN — METOCLOPRAMIDE HYDROCHLORIDE 10 MG: 5 SOLUTION ORAL at 10:53

## 2022-05-28 RX ADMIN — OXYCODONE HYDROCHLORIDE AND ACETAMINOPHEN 1000 MG: 500 TABLET ORAL at 07:47

## 2022-05-28 RX ADMIN — POTASSIUM CHLORIDE 20 MEQ: 29.8 INJECTION, SOLUTION INTRAVENOUS at 14:38

## 2022-05-28 RX ADMIN — POTASSIUM CHLORIDE 60 MEQ: 20 SOLUTION ORAL at 14:26

## 2022-05-28 RX ADMIN — CHLORHEXIDINE GLUCONATE 15 ML: 1.2 SOLUTION ORAL at 08:51

## 2022-05-28 RX ADMIN — PROPOFOL 30 MCG/KG/MIN: 10 INJECTION, EMULSION INTRAVENOUS at 00:30

## 2022-05-28 RX ADMIN — PROPOFOL 30 MCG/KG/MIN: 10 INJECTION, EMULSION INTRAVENOUS at 06:15

## 2022-05-28 RX ADMIN — CARBAMAZEPINE 100 MG: 100 SUSPENSION ORAL at 17:03

## 2022-05-28 RX ADMIN — MEROPENEM 1 G: 1 INJECTION, POWDER, FOR SOLUTION INTRAVENOUS at 00:29

## 2022-05-28 RX ADMIN — POTASSIUM CHLORIDE 20 MEQ: 29.8 INJECTION, SOLUTION INTRAVENOUS at 08:00

## 2022-05-28 RX ADMIN — MEROPENEM 1 G: 1 INJECTION, POWDER, FOR SOLUTION INTRAVENOUS at 16:54

## 2022-05-28 RX ADMIN — Medication 0.09 MCG/KG/MIN: at 03:04

## 2022-05-28 RX ADMIN — ALBUTEROL SULFATE 2.5 MG: 2.5 SOLUTION RESPIRATORY (INHALATION) at 19:37

## 2022-05-28 RX ADMIN — HYDROCORTISONE SODIUM SUCCINATE 50 MG: 100 INJECTION, POWDER, FOR SOLUTION INTRAMUSCULAR; INTRAVENOUS at 13:43

## 2022-05-28 RX ADMIN — Medication 15 ML: at 07:46

## 2022-05-28 RX ADMIN — Medication 0.09 MCG/KG/MIN: at 08:10

## 2022-05-28 RX ADMIN — HYDROCORTISONE SODIUM SUCCINATE 50 MG: 100 INJECTION, POWDER, FOR SOLUTION INTRAMUSCULAR; INTRAVENOUS at 03:04

## 2022-05-28 RX ADMIN — VANCOMYCIN HYDROCHLORIDE 750 MG: 1 INJECTION, POWDER, LYOPHILIZED, FOR SOLUTION INTRAVENOUS at 10:28

## 2022-05-28 RX ADMIN — ALBUTEROL SULFATE 2.5 MG: 2.5 SOLUTION RESPIRATORY (INHALATION) at 07:33

## 2022-05-28 RX ADMIN — CHLORHEXIDINE GLUCONATE 15 ML: 1.2 SOLUTION ORAL at 20:46

## 2022-05-28 RX ADMIN — PROPOFOL 40 MCG/KG/MIN: 10 INJECTION, EMULSION INTRAVENOUS at 23:57

## 2022-05-28 RX ADMIN — METOCLOPRAMIDE HYDROCHLORIDE 10 MG: 5 SOLUTION ORAL at 15:53

## 2022-05-28 RX ADMIN — POTASSIUM CHLORIDE 20 MEQ: 29.8 INJECTION, SOLUTION INTRAVENOUS at 06:47

## 2022-05-28 RX ADMIN — VANCOMYCIN HYDROCHLORIDE 750 MG: 1 INJECTION, POWDER, LYOPHILIZED, FOR SOLUTION INTRAVENOUS at 21:23

## 2022-05-28 RX ADMIN — ALBUTEROL SULFATE 2.5 MG: 2.5 SOLUTION RESPIRATORY (INHALATION) at 12:07

## 2022-05-28 RX ADMIN — CARBAMAZEPINE 150 MG: 100 SUSPENSION ORAL at 00:31

## 2022-05-28 RX ADMIN — Medication 0.08 MCG/KG/MIN: at 12:44

## 2022-05-28 RX ADMIN — SODIUM CHLORIDE: 4.5 INJECTION, SOLUTION INTRAVENOUS at 18:14

## 2022-05-28 RX ADMIN — ACETAMINOPHEN 650 MG: 325 TABLET ORAL at 21:22

## 2022-05-28 RX ADMIN — CALCIUM CARBONATE 1250 MG: 1250 SUSPENSION ORAL at 21:22

## 2022-05-28 RX ADMIN — ALBUTEROL SULFATE 2.5 MG: 2.5 SOLUTION RESPIRATORY (INHALATION) at 23:11

## 2022-05-28 RX ADMIN — CALCIUM CARBONATE 1250 MG: 1250 SUSPENSION ORAL at 13:43

## 2022-05-28 RX ADMIN — CARBAMAZEPINE 150 MG: 100 SUSPENSION ORAL at 12:17

## 2022-05-28 RX ADMIN — ACETYLCYSTEINE 2 ML: 200 SOLUTION ORAL; RESPIRATORY (INHALATION) at 19:37

## 2022-05-28 RX ADMIN — MAGNESIUM SULFATE HEPTAHYDRATE 2 G: 40 INJECTION, SOLUTION INTRAVENOUS at 13:41

## 2022-05-28 RX ADMIN — PROPOFOL 40 MCG/KG/MIN: 10 INJECTION, EMULSION INTRAVENOUS at 18:07

## 2022-05-28 RX ADMIN — POTASSIUM & SODIUM PHOSPHATES POWDER PACK 280-160-250 MG 1 PACKET: 280-160-250 PACK at 13:43

## 2022-05-28 RX ADMIN — POTASSIUM CHLORIDE 20 MEQ: 29.8 INJECTION, SOLUTION INTRAVENOUS at 15:53

## 2022-05-28 RX ADMIN — LEVOTHYROXINE SODIUM 150 MCG: 100 TABLET ORAL at 07:47

## 2022-05-28 RX ADMIN — SODIUM CHLORIDE: 4.5 INJECTION, SOLUTION INTRAVENOUS at 18:13

## 2022-05-28 RX ADMIN — HYDROCORTISONE SODIUM SUCCINATE 50 MG: 100 INJECTION, POWDER, FOR SOLUTION INTRAMUSCULAR; INTRAVENOUS at 07:47

## 2022-05-28 RX ADMIN — ACETYLCYSTEINE 2 ML: 200 SOLUTION ORAL; RESPIRATORY (INHALATION) at 07:33

## 2022-05-28 RX ADMIN — BRIVARACETAM 100 MG: 10 SOLUTION ORAL at 20:46

## 2022-05-28 RX ADMIN — ACETYLCYSTEINE 2 ML: 200 SOLUTION ORAL; RESPIRATORY (INHALATION) at 12:07

## 2022-05-28 RX ADMIN — METOCLOPRAMIDE HYDROCHLORIDE 10 MG: 5 SOLUTION ORAL at 21:22

## 2022-05-28 RX ADMIN — HYDROCORTISONE SODIUM SUCCINATE 50 MG: 100 INJECTION, POWDER, FOR SOLUTION INTRAMUSCULAR; INTRAVENOUS at 20:46

## 2022-05-28 RX ADMIN — SODIUM CHLORIDE 12 UNITS/HR: 9 INJECTION, SOLUTION INTRAVENOUS at 20:40

## 2022-05-28 RX ADMIN — SODIUM CHLORIDE 13 UNITS/HR: 9 INJECTION, SOLUTION INTRAVENOUS at 18:12

## 2022-05-28 RX ADMIN — PROPOFOL 30 MCG/KG/MIN: 10 INJECTION, EMULSION INTRAVENOUS at 12:41

## 2022-05-28 RX ADMIN — INSULIN ASPART 10 UNITS: 100 INJECTION, SOLUTION INTRAVENOUS; SUBCUTANEOUS at 00:28

## 2022-05-28 RX ADMIN — CARBAMAZEPINE 150 MG: 100 SUSPENSION ORAL at 06:47

## 2022-05-28 RX ADMIN — ACETYLCYSTEINE 2 ML: 200 SOLUTION ORAL; RESPIRATORY (INHALATION) at 15:09

## 2022-05-28 RX ADMIN — Medication 40 MG: at 07:47

## 2022-05-28 RX ADMIN — SODIUM CHLORIDE 8.5 UNITS/HR: 9 INJECTION, SOLUTION INTRAVENOUS at 12:58

## 2022-05-28 RX ADMIN — POTASSIUM & SODIUM PHOSPHATES POWDER PACK 280-160-250 MG 1 PACKET: 280-160-250 PACK at 07:47

## 2022-05-28 RX ADMIN — METOCLOPRAMIDE HYDROCHLORIDE 10 MG: 5 SOLUTION ORAL at 07:47

## 2022-05-28 RX ADMIN — CALCIUM CARBONATE 1250 MG: 1250 SUSPENSION ORAL at 07:49

## 2022-05-28 RX ADMIN — SODIUM CHLORIDE 10 UNITS/HR: 9 INJECTION, SOLUTION INTRAVENOUS at 23:30

## 2022-05-28 RX ADMIN — POTASSIUM CHLORIDE 20 MEQ: 29.8 INJECTION, SOLUTION INTRAVENOUS at 10:56

## 2022-05-28 RX ADMIN — ASPIRIN 81 MG CHEWABLE TABLET 81 MG: 81 TABLET CHEWABLE at 07:47

## 2022-05-28 RX ADMIN — POTASSIUM CHLORIDE 20 MEQ: 29.8 INJECTION, SOLUTION INTRAVENOUS at 10:01

## 2022-05-28 RX ADMIN — SODIUM CHLORIDE: 9 INJECTION, SOLUTION INTRAVENOUS at 13:00

## 2022-05-28 RX ADMIN — SODIUM BICARBONATE 650 MG TABLET 650 MG: at 20:46

## 2022-05-28 RX ADMIN — INSULIN ASPART 9 UNITS: 100 INJECTION, SOLUTION INTRAVENOUS; SUBCUTANEOUS at 04:28

## 2022-05-28 RX ADMIN — SODIUM CHLORIDE 5 UNITS/HR: 9 INJECTION, SOLUTION INTRAVENOUS at 10:25

## 2022-05-28 RX ADMIN — SODIUM BICARBONATE 650 MG TABLET 650 MG: at 07:47

## 2022-05-28 RX ADMIN — POTASSIUM & SODIUM PHOSPHATES POWDER PACK 280-160-250 MG 1 PACKET: 280-160-250 PACK at 20:46

## 2022-05-28 RX ADMIN — SODIUM CHLORIDE 14 UNITS/HR: 9 INJECTION, SOLUTION INTRAVENOUS at 15:53

## 2022-05-28 RX ADMIN — Medication 50 MCG: at 07:47

## 2022-05-28 RX ADMIN — CARBAMAZEPINE 150 MG: 100 SUSPENSION ORAL at 23:57

## 2022-05-28 RX ADMIN — INSULIN ASPART 9 UNITS: 100 INJECTION, SOLUTION INTRAVENOUS; SUBCUTANEOUS at 08:09

## 2022-05-28 RX ADMIN — CHLORHEXIDINE GLUCONATE 15 ML: 1.2 SOLUTION ORAL at 00:26

## 2022-05-28 ASSESSMENT — ACTIVITIES OF DAILY LIVING (ADL)
ADLS_ACUITY_SCORE: 49
ADLS_ACUITY_SCORE: 55
ADLS_ACUITY_SCORE: 49

## 2022-05-28 NOTE — PROGRESS NOTES
Patient pressure supported for 20 minutes 7/5. He is on propofol for sedation. Weaned of levo gtt. Potassium replaced several times. Na+ discussed with provider and new orders placed. Rectal tube placed for frequent BMs. Insulin drip started. Mother at bedside today and updated.

## 2022-05-28 NOTE — PROGRESS NOTES
[facility] ICU RESPIRATORY NOTE        Date of Admission: 5/26/2022    Date of Intubation (most recent): 5/27/2022     Reason for Mechanical Ventilation: Airway protection    Number of Days on Mechanical Ventilation: 2    Met Criteria for Spontaneous Breathing Trial: No    Reason for No Spontaneous Breathing Trial: Intubated today    Significant Events Today: None    ABG Results:   Recent Labs   Lab 05/27/22  1724 05/27/22  1425 05/26/22  1519   PH  --  7.49* 7.42   PCO2  --  24*  --    PO2  --  178*  --    HCO3  --  18*  --    O2PER 40 40  --        Current Vent Settings: Vent Mode: CMV/AC  (Continuous Mandatory Ventilation/ Assist Control)  FiO2 (%): 40 %  Resp Rate (Set): 12 breaths/min  Tidal Volume (Set, mL): 500 mL  PEEP (cm H2O): 5 cmH2O  Resp: 12      Skin Assessment: Clean, dry and intact    Plan: Continue full support and wean as tolerated    Pippa Lantigua

## 2022-05-28 NOTE — PROGRESS NOTES
[facility] ICU RESPIRATORY NOTE        Date of Admission: 5/26/2022    Date of Intubation (most recent): 5/27/2022    Reason for Mechanical Ventilation: Airway protection    Number of Days on Mechanical Ventilation: Day 2    Met Criteria for Spontaneous Breathing Trial: No    Reason for No Spontaneous Breathing Trial: Per MD    Significant Events Today: ETAD replaced, bite block removed.     ABG Results:   Recent Labs   Lab 05/27/22  1724 05/27/22  1425 05/26/22  1519   PH  --  7.49* 7.42   PCO2  --  24*  --    PO2  --  178*  --    HCO3  --  18*  --    O2PER 40 40  --        Current Vent Settings: Vent Mode: CMV/AC  (Continuous Mandatory Ventilation/ Assist Control)  FiO2 (%): 40 %  Resp Rate (Set): 12 breaths/min  Tidal Volume (Set, mL): 500 mL  PEEP (cm H2O): 5 cmH2O  Resp: 19      Boaz Mayen, RT

## 2022-05-28 NOTE — PHARMACY-VANCOMYCIN DOSING SERVICE
Pharmacy Vancomycin Note  Date of Service May 28, 2022  Patient's  1960   61 year old, male    Indication: Sepsis  Day of Therapy: 2  Current vancomycin regimen:  Intermittent doses based on levels  Current vancomycin monitoring method: AUC  Current vancomycin therapeutic monitoring goal: 400-600 mg*h/L    [employer]ight[employer] Prediction of Current Vancomycin Regimen  Loading dose: N/A  Regimen: 750 mg IV every 12 hours.  Start time: 10:08 on 2022  Exposure target: AUC24 (range)400-600 mg/L.hr   AUC24,ss: 444 mg/L.hr  Probability of AUC24 > 400: 68 %  Ctrough,ss: 14.6 mg/L  Probability of Ctrough,ss > 20: 13 %  Probability of nephrotoxicity (Lodise ERNESTO ): 10 %      Current estimated CrCl = Estimated Creatinine Clearance: 79 mL/min (based on SCr of 1.08 mg/dL).    Creatinine for last 3 days  2022:  3:23 PM Creatinine 0.92 mg/dL  2022:  6:50 AM Creatinine 1.57 mg/dL;  2:11 PM Creatinine 1.32 mg/dL  2022:  4:37 AM Creatinine 1.08 mg/dL    Recent Vancomycin Levels (past 3 days)  2022:  4:37 AM Vancomycin 9.2 mg/L    Vancomycin IV Administrations (past 72 hours)                   vancomycin 1750 mg in 0.9% NaCl 500 ml intermittent infusion 1,750 mg (mg) 1,750 mg Given 22 0847                Nephrotoxins and other renal medications (From now, onward)    Start     Dose/Rate Route Frequency Ordered Stop    22 0930  vancomycin (VANCOCIN) 750 mg in sodium chloride 0.9 % 250 mL intermittent infusion         750 mg  over 60-90 Minutes Intravenous EVERY 12 HOURS 22 0911      22 1130  vasopressin 0.2 units/mL in NS (PITRESSIN) standard conc infusion         2.4 Units/hr  12 mL/hr  Intravenous CONTINUOUS 22 1123      22 1000  norepinephrine (LEVOPHED) 4 mg in  mL PERIPHERAL infusion        Note to Pharmacy: Pt having RRT for sepsis, may need pressors, ordering empirically    0.03-0.125 mcg/kg/min × 73.6 kg  8.3-34.5 mL/hr  Intravenous CONTINUOUS 22 0823                Contrast Orders - past 72 hours (72h ago, onward)    Start     Dose/Rate Route Frequency Stop    05/27/22 0900  iopamidol (ISOVUE-370) solution 81 mL         81 mL Intravenous ONCE 05/27/22 0853          Interpretation of levels and current regimen:  Vancomycin level is reflective of -600    Has serum creatinine changed greater than 50% in last 72 hours: No    Urine output:  good urine output    Renal Function: Improving    [employer] Prediction of Planned New Vancomycin Regimen  Loading dose: N/A  Regimen: 750 mg IV every 12 hours.  Start time: 10:08 on 05/28/2022  Exposure target: AUC24 (range)400-600 mg/L.hr   AUC24,ss: 444 mg/L.hr  Probability of AUC24 > 400: 68 %  Ctrough,ss: 14.6 mg/L  Probability of Ctrough,ss > 20: 13 %  Probability of nephrotoxicity (Lodise ERNESTO 2009): 10 %      Plan:  1. Increase Dose to 750mg q12h  2. Vancomycin monitoring method: AUC  3. Vancomycin therapeutic monitoring goal: 400-600 mg*h/L  4. Pharmacy will check vancomycin levels as appropriate in 1-3 Days.  5. Serum creatinine levels will be ordered daily for the first week of therapy and at least twice weekly for subsequent weeks.    Autumn Pickering RPH
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
## 2022-05-28 NOTE — PLAN OF CARE
Neuro: Patient  on propofol for sedation.Purpose movement of left upper extremity. Withdraws bilaterally on lower extremities and right upper extremity.   Respiratory: Lung sounds diminished. Moderate oral and ET secretions.   Cardiovascular: SR. On vasopressin and levo to keep MAP greater than 65. LR bolus for LA of 6.9. afebrile.   GI: Tube feeds restarted per MD. BG addressed and sliding scale insulin ordered.   : Lerma placed per MD. Urine cloudy, darell. Adequate urine output.   Skin: Buttocks excoriated.   Plan of care: Mother at bedside today. Updated her about patient's condition. Will continue to monitor.

## 2022-05-28 NOTE — PROVIDER NOTIFICATION
Dr. Haywood notified of critical lab value: K+ 2.1. Received new orders for high-potassium replacement protocol plus an additional 40mEq IV K+. Next recheck per protocol. Also discussed high blood sugars (300s). Advised MD will assess - awaiting new orders.

## 2022-05-29 ENCOUNTER — APPOINTMENT (OUTPATIENT)
Dept: CT IMAGING | Facility: CLINIC | Age: 62
DRG: 871 | End: 2022-05-29
Attending: INTERNAL MEDICINE
Payer: MEDICARE

## 2022-05-29 LAB
ANION GAP SERPL CALCULATED.3IONS-SCNC: 1 MMOL/L (ref 3–14)
ANION GAP SERPL CALCULATED.3IONS-SCNC: 4 MMOL/L (ref 3–14)
BASE EXCESS BLDV CALC-SCNC: 3 MMOL/L (ref -7.7–1.9)
BASOPHILS # BLD AUTO: 0 10E3/UL (ref 0–0.2)
BASOPHILS NFR BLD AUTO: 0 %
BUN SERPL-MCNC: 10 MG/DL (ref 7–30)
BUN SERPL-MCNC: 11 MG/DL (ref 7–30)
CALCIUM SERPL-MCNC: 7.6 MG/DL (ref 8.5–10.1)
CALCIUM SERPL-MCNC: 8.1 MG/DL (ref 8.5–10.1)
CHLORIDE BLD-SCNC: 126 MMOL/L (ref 94–109)
CHLORIDE BLD-SCNC: 130 MMOL/L (ref 94–109)
CO2 SERPL-SCNC: 27 MMOL/L (ref 20–32)
CO2 SERPL-SCNC: 28 MMOL/L (ref 20–32)
CREAT SERPL-MCNC: 0.81 MG/DL (ref 0.66–1.25)
CREAT SERPL-MCNC: 0.83 MG/DL (ref 0.66–1.25)
EOSINOPHIL # BLD AUTO: 0 10E3/UL (ref 0–0.7)
EOSINOPHIL NFR BLD AUTO: 0 %
ERYTHROCYTE [DISTWIDTH] IN BLOOD BY AUTOMATED COUNT: 14.1 % (ref 10–15)
GFR SERPL CREATININE-BSD FRML MDRD: >90 ML/MIN/1.73M2
GFR SERPL CREATININE-BSD FRML MDRD: >90 ML/MIN/1.73M2
GLUCOSE BLD-MCNC: 172 MG/DL (ref 70–99)
GLUCOSE BLD-MCNC: 183 MG/DL (ref 70–99)
GLUCOSE BLDC GLUCOMTR-MCNC: 113 MG/DL (ref 70–99)
GLUCOSE BLDC GLUCOMTR-MCNC: 122 MG/DL (ref 70–99)
GLUCOSE BLDC GLUCOMTR-MCNC: 139 MG/DL (ref 70–99)
GLUCOSE BLDC GLUCOMTR-MCNC: 140 MG/DL (ref 70–99)
GLUCOSE BLDC GLUCOMTR-MCNC: 145 MG/DL (ref 70–99)
GLUCOSE BLDC GLUCOMTR-MCNC: 148 MG/DL (ref 70–99)
GLUCOSE BLDC GLUCOMTR-MCNC: 152 MG/DL (ref 70–99)
GLUCOSE BLDC GLUCOMTR-MCNC: 153 MG/DL (ref 70–99)
GLUCOSE BLDC GLUCOMTR-MCNC: 157 MG/DL (ref 70–99)
GLUCOSE BLDC GLUCOMTR-MCNC: 158 MG/DL (ref 70–99)
GLUCOSE BLDC GLUCOMTR-MCNC: 161 MG/DL (ref 70–99)
GLUCOSE BLDC GLUCOMTR-MCNC: 161 MG/DL (ref 70–99)
GLUCOSE BLDC GLUCOMTR-MCNC: 167 MG/DL (ref 70–99)
GLUCOSE BLDC GLUCOMTR-MCNC: 170 MG/DL (ref 70–99)
GLUCOSE BLDC GLUCOMTR-MCNC: 173 MG/DL (ref 70–99)
GLUCOSE BLDC GLUCOMTR-MCNC: 177 MG/DL (ref 70–99)
GLUCOSE BLDC GLUCOMTR-MCNC: 177 MG/DL (ref 70–99)
GLUCOSE BLDC GLUCOMTR-MCNC: 181 MG/DL (ref 70–99)
GLUCOSE BLDC GLUCOMTR-MCNC: 183 MG/DL (ref 70–99)
GLUCOSE BLDC GLUCOMTR-MCNC: 184 MG/DL (ref 70–99)
GLUCOSE BLDC GLUCOMTR-MCNC: 186 MG/DL (ref 70–99)
HCO3 BLDV-SCNC: 28 MMOL/L (ref 21–28)
HCT VFR BLD AUTO: 33.3 % (ref 40–53)
HGB BLD-MCNC: 10.6 G/DL (ref 13.3–17.7)
IMM GRANULOCYTES # BLD: 0.1 10E3/UL
IMM GRANULOCYTES NFR BLD: 1 %
LYMPHOCYTES # BLD AUTO: 0.8 10E3/UL (ref 0.8–5.3)
LYMPHOCYTES NFR BLD AUTO: 8 %
MAGNESIUM SERPL-MCNC: 2.7 MG/DL (ref 1.6–2.3)
MCH RBC QN AUTO: 29.7 PG (ref 26.5–33)
MCHC RBC AUTO-ENTMCNC: 31.8 G/DL (ref 31.5–36.5)
MCV RBC AUTO: 93 FL (ref 78–100)
MONOCYTES # BLD AUTO: 0.7 10E3/UL (ref 0–1.3)
MONOCYTES NFR BLD AUTO: 6 %
NEUTROPHILS # BLD AUTO: 8.6 10E3/UL (ref 1.6–8.3)
NEUTROPHILS NFR BLD AUTO: 85 %
NRBC # BLD AUTO: 0 10E3/UL
NRBC BLD AUTO-RTO: 0 /100
O2/TOTAL GAS SETTING VFR VENT: 40 %
OSMOLALITY UR: 498 MMOL/KG (ref 100–1200)
PCO2 BLDV: 46 MM HG (ref 40–50)
PH BLDV: 7.4 [PH] (ref 7.32–7.43)
PHOSPHATE SERPL-MCNC: 1.3 MG/DL (ref 2.5–4.5)
PHOSPHATE SERPL-MCNC: 1.8 MG/DL (ref 2.5–4.5)
PLATELET # BLD AUTO: 143 10E3/UL (ref 150–450)
PO2 BLDV: 67 MM HG (ref 25–47)
POTASSIUM BLD-SCNC: 3.5 MMOL/L (ref 3.4–5.3)
POTASSIUM BLD-SCNC: 3.7 MMOL/L (ref 3.4–5.3)
POTASSIUM BLD-SCNC: 4.5 MMOL/L (ref 3.4–5.3)
RBC # BLD AUTO: 3.57 10E6/UL (ref 4.4–5.9)
SODIUM SERPL-SCNC: 154 MMOL/L (ref 133–144)
SODIUM SERPL-SCNC: 155 MMOL/L (ref 133–144)
SODIUM SERPL-SCNC: 157 MMOL/L (ref 133–144)
SODIUM SERPL-SCNC: 159 MMOL/L (ref 133–144)
SODIUM UR-SCNC: 43 MMOL/L
WBC # BLD AUTO: 10.2 10E3/UL (ref 4–11)

## 2022-05-29 PROCEDURE — 83735 ASSAY OF MAGNESIUM: CPT | Performed by: SURGERY

## 2022-05-29 PROCEDURE — 250N000013 HC RX MED GY IP 250 OP 250 PS 637: Performed by: SURGERY

## 2022-05-29 PROCEDURE — 250N000009 HC RX 250: Performed by: HOSPITALIST

## 2022-05-29 PROCEDURE — 84295 ASSAY OF SERUM SODIUM: CPT | Performed by: INTERNAL MEDICINE

## 2022-05-29 PROCEDURE — 258N000003 HC RX IP 258 OP 636: Performed by: INTERNAL MEDICINE

## 2022-05-29 PROCEDURE — 84100 ASSAY OF PHOSPHORUS: CPT | Performed by: SURGERY

## 2022-05-29 PROCEDURE — 94003 VENT MGMT INPAT SUBQ DAY: CPT

## 2022-05-29 PROCEDURE — 250N000011 HC RX IP 250 OP 636: Performed by: NURSE PRACTITIONER

## 2022-05-29 PROCEDURE — 80048 BASIC METABOLIC PNL TOTAL CA: CPT | Performed by: SURGERY

## 2022-05-29 PROCEDURE — 85004 AUTOMATED DIFF WBC COUNT: CPT | Performed by: SURGERY

## 2022-05-29 PROCEDURE — 250N000013 HC RX MED GY IP 250 OP 250 PS 637

## 2022-05-29 PROCEDURE — 250N000011 HC RX IP 250 OP 636: Performed by: HOSPITALIST

## 2022-05-29 PROCEDURE — 250N000011 HC RX IP 250 OP 636: Performed by: INTERNAL MEDICINE

## 2022-05-29 PROCEDURE — 999N000157 HC STATISTIC RCP TIME EA 10 MIN

## 2022-05-29 PROCEDURE — 999N000009 HC STATISTIC AIRWAY CARE

## 2022-05-29 PROCEDURE — 258N000003 HC RX IP 258 OP 636: Performed by: HOSPITALIST

## 2022-05-29 PROCEDURE — G1004 CDSM NDSC: HCPCS

## 2022-05-29 PROCEDURE — 80048 BASIC METABOLIC PNL TOTAL CA: CPT | Performed by: INTERNAL MEDICINE

## 2022-05-29 PROCEDURE — 94640 AIRWAY INHALATION TREATMENT: CPT | Mod: 76

## 2022-05-29 PROCEDURE — 200N000001 HC R&B ICU

## 2022-05-29 PROCEDURE — 258N000003 HC RX IP 258 OP 636: Performed by: SURGERY

## 2022-05-29 PROCEDURE — 250N000013 HC RX MED GY IP 250 OP 250 PS 637: Performed by: HOSPITALIST

## 2022-05-29 PROCEDURE — 99233 SBSQ HOSP IP/OBS HIGH 50: CPT | Performed by: INTERNAL MEDICINE

## 2022-05-29 PROCEDURE — 258N000002 HC RX IP 258 OP 250: Performed by: INTERNAL MEDICINE

## 2022-05-29 PROCEDURE — 84300 ASSAY OF URINE SODIUM: CPT | Performed by: INTERNAL MEDICINE

## 2022-05-29 PROCEDURE — 250N000012 HC RX MED GY IP 250 OP 636 PS 637: Performed by: INTERNAL MEDICINE

## 2022-05-29 PROCEDURE — 94640 AIRWAY INHALATION TREATMENT: CPT

## 2022-05-29 PROCEDURE — 84100 ASSAY OF PHOSPHORUS: CPT

## 2022-05-29 PROCEDURE — 250N000009 HC RX 250: Performed by: SURGERY

## 2022-05-29 PROCEDURE — 82803 BLOOD GASES ANY COMBINATION: CPT | Performed by: INTERNAL MEDICINE

## 2022-05-29 PROCEDURE — 83935 ASSAY OF URINE OSMOLALITY: CPT | Performed by: INTERNAL MEDICINE

## 2022-05-29 PROCEDURE — 250N000009 HC RX 250: Performed by: INTERNAL MEDICINE

## 2022-05-29 PROCEDURE — 84132 ASSAY OF SERUM POTASSIUM: CPT | Performed by: INTERNAL MEDICINE

## 2022-05-29 RX ORDER — SODIUM CHLORIDE 9 MG/ML
INJECTION, SOLUTION INTRAVENOUS CONTINUOUS
Status: DISCONTINUED | OUTPATIENT
Start: 2022-05-29 | End: 2022-06-01

## 2022-05-29 RX ORDER — POTASSIUM CHLORIDE 7.45 MG/ML
10 INJECTION INTRAVENOUS
Status: COMPLETED | OUTPATIENT
Start: 2022-05-29 | End: 2022-05-29

## 2022-05-29 RX ORDER — DEXTROSE MONOHYDRATE 50 MG/ML
INJECTION, SOLUTION INTRAVENOUS CONTINUOUS
Status: DISCONTINUED | OUTPATIENT
Start: 2022-05-29 | End: 2022-05-31

## 2022-05-29 RX ORDER — CEFTRIAXONE 2 G/1
2 INJECTION, POWDER, FOR SOLUTION INTRAMUSCULAR; INTRAVENOUS EVERY 24 HOURS
Status: DISCONTINUED | OUTPATIENT
Start: 2022-05-29 | End: 2022-06-03

## 2022-05-29 RX ADMIN — Medication 50 MCG: at 07:46

## 2022-05-29 RX ADMIN — SODIUM CHLORIDE 6 UNITS/HR: 9 INJECTION, SOLUTION INTRAVENOUS at 02:55

## 2022-05-29 RX ADMIN — SODIUM CHLORIDE 10 UNITS/HR: 9 INJECTION, SOLUTION INTRAVENOUS at 16:55

## 2022-05-29 RX ADMIN — HYDROCORTISONE SODIUM SUCCINATE 50 MG: 100 INJECTION, POWDER, FOR SOLUTION INTRAMUSCULAR; INTRAVENOUS at 02:10

## 2022-05-29 RX ADMIN — DEXTROSE MONOHYDRATE: 50 INJECTION, SOLUTION INTRAVENOUS at 13:18

## 2022-05-29 RX ADMIN — POTASSIUM & SODIUM PHOSPHATES POWDER PACK 280-160-250 MG 1 PACKET: 280-160-250 PACK at 07:46

## 2022-05-29 RX ADMIN — SODIUM CHLORIDE 12 UNITS/HR: 9 INJECTION, SOLUTION INTRAVENOUS at 19:35

## 2022-05-29 RX ADMIN — SODIUM CHLORIDE 11 UNITS/HR: 9 INJECTION, SOLUTION INTRAVENOUS at 08:48

## 2022-05-29 RX ADMIN — HYDROCORTISONE SODIUM SUCCINATE 50 MG: 100 INJECTION, POWDER, FOR SOLUTION INTRAMUSCULAR; INTRAVENOUS at 13:23

## 2022-05-29 RX ADMIN — LEVOTHYROXINE SODIUM 150 MCG: 100 TABLET ORAL at 07:46

## 2022-05-29 RX ADMIN — SODIUM CHLORIDE 12 UNITS/HR: 9 INJECTION, SOLUTION INTRAVENOUS at 11:57

## 2022-05-29 RX ADMIN — CARBAMAZEPINE 100 MG: 100 SUSPENSION ORAL at 17:58

## 2022-05-29 RX ADMIN — CALCIUM CARBONATE 1250 MG: 1250 SUSPENSION ORAL at 19:37

## 2022-05-29 RX ADMIN — CALCIUM CARBONATE 1250 MG: 1250 SUSPENSION ORAL at 13:23

## 2022-05-29 RX ADMIN — ACETYLCYSTEINE 2 ML: 200 SOLUTION ORAL; RESPIRATORY (INHALATION) at 07:54

## 2022-05-29 RX ADMIN — CALCIUM CARBONATE 1250 MG: 1250 SUSPENSION ORAL at 07:51

## 2022-05-29 RX ADMIN — ACETYLCYSTEINE 2 ML: 200 SOLUTION ORAL; RESPIRATORY (INHALATION) at 15:39

## 2022-05-29 RX ADMIN — ASPIRIN 81 MG CHEWABLE TABLET 81 MG: 81 TABLET CHEWABLE at 07:46

## 2022-05-29 RX ADMIN — ALBUTEROL SULFATE 2.5 MG: 2.5 SOLUTION RESPIRATORY (INHALATION) at 22:50

## 2022-05-29 RX ADMIN — METOCLOPRAMIDE HYDROCHLORIDE 10 MG: 5 SOLUTION ORAL at 10:50

## 2022-05-29 RX ADMIN — SODIUM PHOSPHATE, MONOBASIC, MONOHYDRATE 15 MMOL: 276; 142 INJECTION, SOLUTION INTRAVENOUS at 17:56

## 2022-05-29 RX ADMIN — ACETYLCYSTEINE 2 ML: 200 SOLUTION ORAL; RESPIRATORY (INHALATION) at 19:22

## 2022-05-29 RX ADMIN — POTASSIUM CHLORIDE 10 MEQ: 7.46 INJECTION, SOLUTION INTRAVENOUS at 14:26

## 2022-05-29 RX ADMIN — CARBAMAZEPINE 150 MG: 100 SUSPENSION ORAL at 05:01

## 2022-05-29 RX ADMIN — POTASSIUM & SODIUM PHOSPHATES POWDER PACK 280-160-250 MG 1 PACKET: 280-160-250 PACK at 13:23

## 2022-05-29 RX ADMIN — MEROPENEM 1 G: 1 INJECTION, POWDER, FOR SOLUTION INTRAVENOUS at 00:05

## 2022-05-29 RX ADMIN — DEXTROSE MONOHYDRATE: 50 INJECTION, SOLUTION INTRAVENOUS at 21:54

## 2022-05-29 RX ADMIN — POTASSIUM CHLORIDE 10 MEQ: 7.46 INJECTION, SOLUTION INTRAVENOUS at 17:45

## 2022-05-29 RX ADMIN — SODIUM PHOSPHATE, MONOBASIC, MONOHYDRATE 15 MMOL: 276; 142 INJECTION, SOLUTION INTRAVENOUS at 05:56

## 2022-05-29 RX ADMIN — ACETYLCYSTEINE 2 ML: 200 SOLUTION ORAL; RESPIRATORY (INHALATION) at 11:13

## 2022-05-29 RX ADMIN — BRIVARACETAM 100 MG: 10 SOLUTION ORAL at 08:34

## 2022-05-29 RX ADMIN — MEROPENEM 1 G: 1 INJECTION, POWDER, FOR SOLUTION INTRAVENOUS at 08:01

## 2022-05-29 RX ADMIN — Medication 40 MG: at 07:46

## 2022-05-29 RX ADMIN — ALBUTEROL SULFATE 2.5 MG: 2.5 SOLUTION RESPIRATORY (INHALATION) at 19:22

## 2022-05-29 RX ADMIN — METOCLOPRAMIDE HYDROCHLORIDE 10 MG: 5 SOLUTION ORAL at 07:46

## 2022-05-29 RX ADMIN — POTASSIUM & SODIUM PHOSPHATES POWDER PACK 280-160-250 MG 1 PACKET: 280-160-250 PACK at 19:37

## 2022-05-29 RX ADMIN — SODIUM CHLORIDE: 4.5 INJECTION, SOLUTION INTRAVENOUS at 02:15

## 2022-05-29 RX ADMIN — HYDROCORTISONE SODIUM SUCCINATE 50 MG: 100 INJECTION, POWDER, FOR SOLUTION INTRAMUSCULAR; INTRAVENOUS at 07:47

## 2022-05-29 RX ADMIN — CEFTRIAXONE SODIUM 2 G: 2 INJECTION, POWDER, FOR SOLUTION INTRAMUSCULAR; INTRAVENOUS at 13:19

## 2022-05-29 RX ADMIN — OXYCODONE HYDROCHLORIDE AND ACETAMINOPHEN 1000 MG: 500 TABLET ORAL at 07:46

## 2022-05-29 RX ADMIN — PROPOFOL 20 MCG/KG/MIN: 10 INJECTION, EMULSION INTRAVENOUS at 18:01

## 2022-05-29 RX ADMIN — SODIUM CHLORIDE 14 UNITS/HR: 9 INJECTION, SOLUTION INTRAVENOUS at 14:25

## 2022-05-29 RX ADMIN — ALBUTEROL SULFATE 2.5 MG: 2.5 SOLUTION RESPIRATORY (INHALATION) at 11:13

## 2022-05-29 RX ADMIN — SODIUM CHLORIDE 9 UNITS/HR: 9 INJECTION, SOLUTION INTRAVENOUS at 06:19

## 2022-05-29 RX ADMIN — POTASSIUM CHLORIDE 10 MEQ: 7.46 INJECTION, SOLUTION INTRAVENOUS at 13:21

## 2022-05-29 RX ADMIN — ALBUTEROL SULFATE 2.5 MG: 2.5 SOLUTION RESPIRATORY (INHALATION) at 15:39

## 2022-05-29 RX ADMIN — METOCLOPRAMIDE HYDROCHLORIDE 10 MG: 5 SOLUTION ORAL at 15:56

## 2022-05-29 RX ADMIN — PROPOFOL 40 MCG/KG/MIN: 10 INJECTION, EMULSION INTRAVENOUS at 04:17

## 2022-05-29 RX ADMIN — Medication 15 ML: at 07:46

## 2022-05-29 RX ADMIN — ALBUTEROL SULFATE 2.5 MG: 2.5 SOLUTION RESPIRATORY (INHALATION) at 07:54

## 2022-05-29 RX ADMIN — VANCOMYCIN HYDROCHLORIDE 750 MG: 1 INJECTION, POWDER, LYOPHILIZED, FOR SOLUTION INTRAVENOUS at 09:19

## 2022-05-29 RX ADMIN — HYDROCORTISONE SODIUM SUCCINATE 50 MG: 100 INJECTION, POWDER, FOR SOLUTION INTRAMUSCULAR; INTRAVENOUS at 19:29

## 2022-05-29 RX ADMIN — BRIVARACETAM 100 MG: 10 SOLUTION ORAL at 19:56

## 2022-05-29 RX ADMIN — METOCLOPRAMIDE HYDROCHLORIDE 10 MG: 5 SOLUTION ORAL at 21:45

## 2022-05-29 RX ADMIN — SODIUM CHLORIDE: 9 INJECTION, SOLUTION INTRAVENOUS at 07:59

## 2022-05-29 RX ADMIN — DEXTROSE MONOHYDRATE: 50 INJECTION, SOLUTION INTRAVENOUS at 07:59

## 2022-05-29 RX ADMIN — POTASSIUM CHLORIDE 10 MEQ: 7.46 INJECTION, SOLUTION INTRAVENOUS at 19:27

## 2022-05-29 RX ADMIN — PROPOFOL 40 MCG/KG/MIN: 10 INJECTION, EMULSION INTRAVENOUS at 09:22

## 2022-05-29 RX ADMIN — CHLORHEXIDINE GLUCONATE 15 ML: 1.2 SOLUTION ORAL at 19:30

## 2022-05-29 RX ADMIN — SODIUM CHLORIDE 12 UNITS/HR: 9 INJECTION, SOLUTION INTRAVENOUS at 21:45

## 2022-05-29 RX ADMIN — SODIUM BICARBONATE 650 MG TABLET 650 MG: at 07:46

## 2022-05-29 RX ADMIN — CARBAMAZEPINE 150 MG: 100 SUSPENSION ORAL at 11:42

## 2022-05-29 RX ADMIN — CHLORHEXIDINE GLUCONATE 15 ML: 1.2 SOLUTION ORAL at 07:46

## 2022-05-29 ASSESSMENT — ACTIVITIES OF DAILY LIVING (ADL)
ADLS_ACUITY_SCORE: 51
ADLS_ACUITY_SCORE: 55
ADLS_ACUITY_SCORE: 51
ADLS_ACUITY_SCORE: 55
ADLS_ACUITY_SCORE: 51

## 2022-05-29 NOTE — PROGRESS NOTES
[facility] ICU RESPIRATORY NOTE        Date of Admission: 5/26/2022    Date of Intubation (most recent): 5/27/2022.     Reason for Mechanical Ventilation: Airway Protection.    Number of Days on Mechanical Ventilation: Day 3.     Met Criteria for Spontaneous Breathing Trial: Yes.    Significant Events Today: Patient placed on SBT 5/5 30% for 1.5 hours today before being placed on full-support for trip to CT.     ABG Results:   Recent Labs   Lab 05/29/22  0843 05/27/22  1724 05/27/22  1425 05/26/22  1519   PH  --   --  7.49* 7.42   PCO2  --   --  24*  --    PO2  --   --  178*  --    HCO3  --   --  18*  --    O2PER 40 40 40  --        Current Vent Settings: Vent Mode: CMV/AC  (Continuous Mandatory Ventilation/ Assist Control) (switched back to full support for trip to CT)  FiO2 (%): 40 %  Resp Rate (Set): 12 breaths/min  Tidal Volume (Set, mL): 500 mL  PEEP (cm H2O): 5 cmH2O  Pressure Support (cm H2O): 5 cmH2O  Resp: 15      Boaz Mayen, RT

## 2022-05-29 NOTE — PLAN OF CARE
Neuro: PERRL, opens eyes to to voice, withdraws with BLE and RUE, intermittently follows commands with LUE, propofol infusing     CV: tele SR/ST (low 100s), levo infusing to maintain MAP > 65     Resp: LS coarse, vent settings unchanged     GI: BS active, small liquid stool via rectal tube (not yet reaching collection bag), TF infusing via PEG tube at goal rate     : aaron with adequate urine output      Skin: coccyx with blanchable erythema - mepilex CDI     Additional: afebrile, phosphorus replacement infusing - recheck at 4pm, insulin infusing on algorithm 4 +3, mom updated at bedside in evening

## 2022-05-29 NOTE — PROVIDER NOTIFICATION
Dr. Casillas notified of Na 159 - received order to increase free water flush to 200 ml q4h. Also notified phosphorus 1.3 - received new order for phosphorus replacement protocol and magnesium replacement protocol.

## 2022-05-29 NOTE — PROGRESS NOTES
[facility] ICU RESPIRATORY NOTE           Date of Admission: 5/26/2022     Date of Intubation (most recent): 5/27/2022     Reason for Mechanical Ventilation: Airway protection     Number of Days on Mechanical Ventilation: 3     Met Criteria for Spontaneous Breathing Trial: No     Reason for No Spontaneous Breathing Trial: Per MD     Significant Events Today: None overnight     ABG Results:   Recent Labs   Lab 05/27/22  1724 05/27/22  1425 05/26/22  1519   PH  --  7.49* 7.42   PCO2  --  24*  --    PO2  --  178*  --    HCO3  --  18*  --    O2PER 40 40  --      Vent Mode: CMV/AC  (Continuous Mandatory Ventilation/ Assist Control)  FiO2 (%): 40 %  Resp Rate (Set): 12 breaths/min  Tidal Volume (Set, mL): 500 mL  PEEP (cm H2O): 5 cmH2O  Pressure Support (cm H2O): 7 cmH2O  Resp: 12    BALA Catherine, RRT

## 2022-05-30 LAB
ANION GAP SERPL CALCULATED.3IONS-SCNC: 3 MMOL/L (ref 3–14)
BUN SERPL-MCNC: 9 MG/DL (ref 7–30)
CALCIUM SERPL-MCNC: 7 MG/DL (ref 8.5–10.1)
CHLORIDE BLD-SCNC: 118 MMOL/L (ref 94–109)
CO2 SERPL-SCNC: 28 MMOL/L (ref 20–32)
CREAT SERPL-MCNC: 0.74 MG/DL (ref 0.66–1.25)
GFR SERPL CREATININE-BSD FRML MDRD: >90 ML/MIN/1.73M2
GLUCOSE BLD-MCNC: 121 MG/DL (ref 70–99)
GLUCOSE BLDC GLUCOMTR-MCNC: 108 MG/DL (ref 70–99)
GLUCOSE BLDC GLUCOMTR-MCNC: 110 MG/DL (ref 70–99)
GLUCOSE BLDC GLUCOMTR-MCNC: 111 MG/DL (ref 70–99)
GLUCOSE BLDC GLUCOMTR-MCNC: 114 MG/DL (ref 70–99)
GLUCOSE BLDC GLUCOMTR-MCNC: 119 MG/DL (ref 70–99)
GLUCOSE BLDC GLUCOMTR-MCNC: 120 MG/DL (ref 70–99)
GLUCOSE BLDC GLUCOMTR-MCNC: 135 MG/DL (ref 70–99)
GLUCOSE BLDC GLUCOMTR-MCNC: 142 MG/DL (ref 70–99)
GLUCOSE BLDC GLUCOMTR-MCNC: 143 MG/DL (ref 70–99)
GLUCOSE BLDC GLUCOMTR-MCNC: 144 MG/DL (ref 70–99)
GLUCOSE BLDC GLUCOMTR-MCNC: 145 MG/DL (ref 70–99)
GLUCOSE BLDC GLUCOMTR-MCNC: 146 MG/DL (ref 70–99)
MAGNESIUM SERPL-MCNC: 2.1 MG/DL (ref 1.6–2.3)
PHOSPHATE SERPL-MCNC: 2.8 MG/DL (ref 2.5–4.5)
POTASSIUM BLD-SCNC: 3.5 MMOL/L (ref 3.4–5.3)
POTASSIUM BLD-SCNC: 4.2 MMOL/L (ref 3.4–5.3)
SODIUM SERPL-SCNC: 145 MMOL/L (ref 133–144)
SODIUM SERPL-SCNC: 147 MMOL/L (ref 133–144)
SODIUM SERPL-SCNC: 149 MMOL/L (ref 133–144)
SODIUM SERPL-SCNC: 152 MMOL/L (ref 133–144)

## 2022-05-30 PROCEDURE — 999N000157 HC STATISTIC RCP TIME EA 10 MIN

## 2022-05-30 PROCEDURE — 250N000013 HC RX MED GY IP 250 OP 250 PS 637

## 2022-05-30 PROCEDURE — 200N000001 HC R&B ICU

## 2022-05-30 PROCEDURE — 94003 VENT MGMT INPAT SUBQ DAY: CPT

## 2022-05-30 PROCEDURE — 250N000011 HC RX IP 250 OP 636: Performed by: NURSE PRACTITIONER

## 2022-05-30 PROCEDURE — 84295 ASSAY OF SERUM SODIUM: CPT | Performed by: INTERNAL MEDICINE

## 2022-05-30 PROCEDURE — 99233 SBSQ HOSP IP/OBS HIGH 50: CPT | Performed by: INTERNAL MEDICINE

## 2022-05-30 PROCEDURE — 250N000011 HC RX IP 250 OP 636: Performed by: INTERNAL MEDICINE

## 2022-05-30 PROCEDURE — 250N000013 HC RX MED GY IP 250 OP 250 PS 637: Performed by: SURGERY

## 2022-05-30 PROCEDURE — 84132 ASSAY OF SERUM POTASSIUM: CPT | Performed by: INTERNAL MEDICINE

## 2022-05-30 PROCEDURE — 999N000009 HC STATISTIC AIRWAY CARE

## 2022-05-30 PROCEDURE — 258N000003 HC RX IP 258 OP 636: Performed by: INTERNAL MEDICINE

## 2022-05-30 PROCEDURE — 999N000253 HC STATISTIC WEANING TRIALS

## 2022-05-30 PROCEDURE — 84295 ASSAY OF SERUM SODIUM: CPT | Performed by: HOSPITALIST

## 2022-05-30 PROCEDURE — 250N000013 HC RX MED GY IP 250 OP 250 PS 637: Performed by: HOSPITALIST

## 2022-05-30 PROCEDURE — 84100 ASSAY OF PHOSPHORUS: CPT | Performed by: INTERNAL MEDICINE

## 2022-05-30 PROCEDURE — 94640 AIRWAY INHALATION TREATMENT: CPT | Mod: 76

## 2022-05-30 PROCEDURE — 250N000009 HC RX 250: Performed by: HOSPITALIST

## 2022-05-30 PROCEDURE — 94640 AIRWAY INHALATION TREATMENT: CPT

## 2022-05-30 PROCEDURE — 83735 ASSAY OF MAGNESIUM: CPT | Performed by: HOSPITALIST

## 2022-05-30 PROCEDURE — 250N000012 HC RX MED GY IP 250 OP 636 PS 637: Performed by: INTERNAL MEDICINE

## 2022-05-30 RX ORDER — POTASSIUM CHLORIDE 29.8 MG/ML
20 INJECTION INTRAVENOUS ONCE
Status: COMPLETED | OUTPATIENT
Start: 2022-05-30 | End: 2022-05-30

## 2022-05-30 RX ADMIN — Medication 15 ML: at 07:25

## 2022-05-30 RX ADMIN — ALBUTEROL SULFATE 2.5 MG: 2.5 SOLUTION RESPIRATORY (INHALATION) at 07:09

## 2022-05-30 RX ADMIN — CARBAMAZEPINE 150 MG: 100 SUSPENSION ORAL at 00:30

## 2022-05-30 RX ADMIN — ACETYLCYSTEINE 2 ML: 200 SOLUTION ORAL; RESPIRATORY (INHALATION) at 11:34

## 2022-05-30 RX ADMIN — CEFTRIAXONE SODIUM 2 G: 2 INJECTION, POWDER, FOR SOLUTION INTRAMUSCULAR; INTRAVENOUS at 13:24

## 2022-05-30 RX ADMIN — DEXTROSE MONOHYDRATE: 50 INJECTION, SOLUTION INTRAVENOUS at 07:26

## 2022-05-30 RX ADMIN — ACETYLCYSTEINE 2 ML: 200 SOLUTION ORAL; RESPIRATORY (INHALATION) at 15:54

## 2022-05-30 RX ADMIN — CARBAMAZEPINE 150 MG: 100 SUSPENSION ORAL at 11:20

## 2022-05-30 RX ADMIN — SODIUM CHLORIDE 5 UNITS/HR: 9 INJECTION, SOLUTION INTRAVENOUS at 18:30

## 2022-05-30 RX ADMIN — POTASSIUM & SODIUM PHOSPHATES POWDER PACK 280-160-250 MG 1 PACKET: 280-160-250 PACK at 13:26

## 2022-05-30 RX ADMIN — CARBAMAZEPINE 100 MG: 100 SUSPENSION ORAL at 17:55

## 2022-05-30 RX ADMIN — ACETYLCYSTEINE 2 ML: 200 SOLUTION ORAL; RESPIRATORY (INHALATION) at 19:44

## 2022-05-30 RX ADMIN — CALCIUM CARBONATE 1250 MG: 1250 SUSPENSION ORAL at 13:26

## 2022-05-30 RX ADMIN — SODIUM CHLORIDE 10 UNITS/HR: 9 INJECTION, SOLUTION INTRAVENOUS at 00:53

## 2022-05-30 RX ADMIN — SODIUM CHLORIDE 10 UNITS/HR: 9 INJECTION, SOLUTION INTRAVENOUS at 07:29

## 2022-05-30 RX ADMIN — METOCLOPRAMIDE HYDROCHLORIDE 10 MG: 5 SOLUTION ORAL at 11:20

## 2022-05-30 RX ADMIN — Medication 40 MG: at 07:25

## 2022-05-30 RX ADMIN — ALBUTEROL SULFATE 2.5 MG: 2.5 SOLUTION RESPIRATORY (INHALATION) at 11:34

## 2022-05-30 RX ADMIN — SODIUM CHLORIDE 9 UNITS/HR: 9 INJECTION, SOLUTION INTRAVENOUS at 13:32

## 2022-05-30 RX ADMIN — DEXTROSE MONOHYDRATE: 50 INJECTION, SOLUTION INTRAVENOUS at 17:44

## 2022-05-30 RX ADMIN — Medication 50 MCG: at 07:25

## 2022-05-30 RX ADMIN — ALBUTEROL SULFATE 2.5 MG: 2.5 SOLUTION RESPIRATORY (INHALATION) at 15:54

## 2022-05-30 RX ADMIN — HYDROCORTISONE SODIUM SUCCINATE 50 MG: 100 INJECTION, POWDER, FOR SOLUTION INTRAMUSCULAR; INTRAVENOUS at 02:33

## 2022-05-30 RX ADMIN — ALBUTEROL SULFATE 2.5 MG: 2.5 SOLUTION RESPIRATORY (INHALATION) at 19:44

## 2022-05-30 RX ADMIN — ALBUTEROL SULFATE 2.5 MG: 2.5 SOLUTION RESPIRATORY (INHALATION) at 23:28

## 2022-05-30 RX ADMIN — CALCIUM CARBONATE 1250 MG: 1250 SUSPENSION ORAL at 07:27

## 2022-05-30 RX ADMIN — CALCIUM CARBONATE 1250 MG: 1250 SUSPENSION ORAL at 20:33

## 2022-05-30 RX ADMIN — METOCLOPRAMIDE HYDROCHLORIDE 10 MG: 5 SOLUTION ORAL at 16:15

## 2022-05-30 RX ADMIN — ACETYLCYSTEINE 2 ML: 200 SOLUTION ORAL; RESPIRATORY (INHALATION) at 07:09

## 2022-05-30 RX ADMIN — POTASSIUM & SODIUM PHOSPHATES POWDER PACK 280-160-250 MG 1 PACKET: 280-160-250 PACK at 07:25

## 2022-05-30 RX ADMIN — POTASSIUM & SODIUM PHOSPHATES POWDER PACK 280-160-250 MG 1 PACKET: 280-160-250 PACK at 20:32

## 2022-05-30 RX ADMIN — CARBAMAZEPINE 150 MG: 100 SUSPENSION ORAL at 06:32

## 2022-05-30 RX ADMIN — HYDROCORTISONE SODIUM SUCCINATE 50 MG: 100 INJECTION, POWDER, FOR SOLUTION INTRAMUSCULAR; INTRAVENOUS at 13:26

## 2022-05-30 RX ADMIN — BRIVARACETAM 100 MG: 10 SOLUTION ORAL at 07:39

## 2022-05-30 RX ADMIN — POTASSIUM CHLORIDE 20 MEQ: 29.8 INJECTION, SOLUTION INTRAVENOUS at 07:46

## 2022-05-30 RX ADMIN — LEVOTHYROXINE SODIUM 150 MCG: 100 TABLET ORAL at 07:25

## 2022-05-30 RX ADMIN — CHLORHEXIDINE GLUCONATE 15 ML: 1.2 SOLUTION ORAL at 20:32

## 2022-05-30 RX ADMIN — BRIVARACETAM 100 MG: 10 SOLUTION ORAL at 20:32

## 2022-05-30 RX ADMIN — OXYCODONE HYDROCHLORIDE AND ACETAMINOPHEN 1000 MG: 500 TABLET ORAL at 07:25

## 2022-05-30 RX ADMIN — HYDROCORTISONE SODIUM SUCCINATE 50 MG: 100 INJECTION, POWDER, FOR SOLUTION INTRAMUSCULAR; INTRAVENOUS at 07:25

## 2022-05-30 RX ADMIN — ASPIRIN 81 MG CHEWABLE TABLET 81 MG: 81 TABLET CHEWABLE at 07:25

## 2022-05-30 RX ADMIN — PROPOFOL 20 MCG/KG/MIN: 10 INJECTION, EMULSION INTRAVENOUS at 00:55

## 2022-05-30 RX ADMIN — SODIUM CHLORIDE 7 UNITS/HR: 9 INJECTION, SOLUTION INTRAVENOUS at 11:18

## 2022-05-30 RX ADMIN — HYDROCORTISONE SODIUM SUCCINATE 50 MG: 100 INJECTION, POWDER, FOR SOLUTION INTRAMUSCULAR; INTRAVENOUS at 20:32

## 2022-05-30 RX ADMIN — METOCLOPRAMIDE HYDROCHLORIDE 10 MG: 5 SOLUTION ORAL at 22:44

## 2022-05-30 RX ADMIN — METOCLOPRAMIDE HYDROCHLORIDE 10 MG: 5 SOLUTION ORAL at 07:25

## 2022-05-30 RX ADMIN — SODIUM CHLORIDE 10 UNITS/HR: 9 INJECTION, SOLUTION INTRAVENOUS at 04:03

## 2022-05-30 RX ADMIN — CHLORHEXIDINE GLUCONATE 15 ML: 1.2 SOLUTION ORAL at 07:25

## 2022-05-30 ASSESSMENT — ACTIVITIES OF DAILY LIVING (ADL)
ADLS_ACUITY_SCORE: 51
ADLS_ACUITY_SCORE: 53
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
## 2022-05-30 NOTE — PROGRESS NOTES
[facility] ICU RESPIRATORY NOTE           Date of Admission: 5/26/2022     Date of Intubation (most recent): 5/27/2022.      Reason for Mechanical Ventilation: Airway Protection.     Number of Days on Mechanical Ventilation: 4     Met Criteria for Spontaneous Breathing Trial: Yes.     Significant Events Today: None overnight     ABG Results:   Recent Labs   Lab 05/29/22  0843 05/27/22  1724 05/27/22  1425 05/26/22  1519   PH  --   --  7.49* 7.42   PCO2  --   --  24*  --    PO2  --   --  178*  --    HCO3  --   --  18*  --    O2PER 40 40 40  --      Vent Mode: CMV/AC  (Continuous Mandatory Ventilation/ Assist Control)  FiO2 (%): 40 %  Resp Rate (Set): 12 breaths/min  Tidal Volume (Set, mL): 500 mL  PEEP (cm H2O): 5 cmH2O  Pressure Support (cm H2O): 5 cmH2O  Resp: 17    BALA Catherine, RRT

## 2022-05-30 NOTE — PLAN OF CARE
Neuro: nonverbal, coughing and yelling out at times. RUE contracted, LUE squeezes. Follows intermittently.   CV: SR, BP stable off levophed  Resp: extubated to oxymask at around 0900 to oxymask. LS coarse, able to cough   GI/: aaron and rectal tube. Good UOP. PEG tube.  Skin: L PIV infiltrated. Bruising scattered  Pain/Gtts: no pain. Insulin gtt, D5%  Family: updated

## 2022-05-30 NOTE — PLAN OF CARE
Neuro: R pupil 5 L pupil 3, both reactive. Withdraws in BLE and RUE. Intermittently follows.   CV: SR. BP stable off levophed  Resp: remains on full vent support.   GI/: PEG tube, TF at goal. Rectal tube and aaron in place  Skin: mepilex on abdomen and ankles.   Pain/Gtts: propofol, insulin and 5%  Family: mother updated  POC: phos replacement infusing. K recheck in AM.

## 2022-05-30 NOTE — PROGRESS NOTES
Pt extubated to 3L oxymask per MD order.  LS are coarse t/o and no stridor noted.  Will continue to follow with nebs and encourage to cough.  Red rBown, RT  5/30/2022

## 2022-05-30 NOTE — PLAN OF CARE
Neuro/RASS: LUE squeezes, RUE casey and withdrawing from pain, BLE withdraws from pain   Tele/CV: SR/ ST occasional PVC's.   Resp/Pulm: pt continue to be vented, 40%, 500 TV. 12 rate and 5 peep.   GI/: voiding via aaron with patent but low output. Yellow/ darell colored.  Integumentary: PEG tube to abdomen, CDI. Scattered abrasions, bruised fingers.   Infusion/IV access: L/R PIV's R. Femoral art line. Insulin, D5, propofol. Blanchable redness to coccyx with mepilx covering.

## 2022-05-31 LAB
BACTERIA BLD CULT: NO GROWTH
CREAT SERPL-MCNC: 0.71 MG/DL (ref 0.66–1.25)
GFR SERPL CREATININE-BSD FRML MDRD: >90 ML/MIN/1.73M2
GLUCOSE BLDC GLUCOMTR-MCNC: 104 MG/DL (ref 70–99)
GLUCOSE BLDC GLUCOMTR-MCNC: 106 MG/DL (ref 70–99)
GLUCOSE BLDC GLUCOMTR-MCNC: 107 MG/DL (ref 70–99)
GLUCOSE BLDC GLUCOMTR-MCNC: 114 MG/DL (ref 70–99)
GLUCOSE BLDC GLUCOMTR-MCNC: 117 MG/DL (ref 70–99)
GLUCOSE BLDC GLUCOMTR-MCNC: 121 MG/DL (ref 70–99)
GLUCOSE BLDC GLUCOMTR-MCNC: 129 MG/DL (ref 70–99)
GLUCOSE BLDC GLUCOMTR-MCNC: 136 MG/DL (ref 70–99)
GLUCOSE BLDC GLUCOMTR-MCNC: 144 MG/DL (ref 70–99)
GLUCOSE BLDC GLUCOMTR-MCNC: 147 MG/DL (ref 70–99)
GLUCOSE BLDC GLUCOMTR-MCNC: 152 MG/DL (ref 70–99)
GLUCOSE BLDC GLUCOMTR-MCNC: 82 MG/DL (ref 70–99)
GLUCOSE BLDC GLUCOMTR-MCNC: 93 MG/DL (ref 70–99)
GLUCOSE BLDC GLUCOMTR-MCNC: 96 MG/DL (ref 70–99)
HBA1C MFR BLD: 5.5 % (ref 0–5.6)
MAGNESIUM SERPL-MCNC: 2.3 MG/DL (ref 1.6–2.3)
PHOSPHATE SERPL-MCNC: 2.3 MG/DL (ref 2.5–4.5)
POTASSIUM BLD-SCNC: 4.1 MMOL/L (ref 3.4–5.3)
SODIUM SERPL-SCNC: 139 MMOL/L (ref 133–144)
SODIUM SERPL-SCNC: 140 MMOL/L (ref 133–144)
SODIUM SERPL-SCNC: 140 MMOL/L (ref 133–144)
SODIUM SERPL-SCNC: 144 MMOL/L (ref 133–144)

## 2022-05-31 PROCEDURE — 250N000013 HC RX MED GY IP 250 OP 250 PS 637: Performed by: SURGERY

## 2022-05-31 PROCEDURE — 250N000011 HC RX IP 250 OP 636: Performed by: INTERNAL MEDICINE

## 2022-05-31 PROCEDURE — 84132 ASSAY OF SERUM POTASSIUM: CPT | Performed by: INTERNAL MEDICINE

## 2022-05-31 PROCEDURE — 84295 ASSAY OF SERUM SODIUM: CPT | Performed by: INTERNAL MEDICINE

## 2022-05-31 PROCEDURE — 250N000009 HC RX 250: Performed by: HOSPITALIST

## 2022-05-31 PROCEDURE — 258N000003 HC RX IP 258 OP 636: Performed by: INTERNAL MEDICINE

## 2022-05-31 PROCEDURE — 94640 AIRWAY INHALATION TREATMENT: CPT

## 2022-05-31 PROCEDURE — 99233 SBSQ HOSP IP/OBS HIGH 50: CPT | Performed by: INTERNAL MEDICINE

## 2022-05-31 PROCEDURE — 250N000012 HC RX MED GY IP 250 OP 636 PS 637: Performed by: INTERNAL MEDICINE

## 2022-05-31 PROCEDURE — 83735 ASSAY OF MAGNESIUM: CPT | Performed by: INTERNAL MEDICINE

## 2022-05-31 PROCEDURE — 999N000157 HC STATISTIC RCP TIME EA 10 MIN

## 2022-05-31 PROCEDURE — 250N000013 HC RX MED GY IP 250 OP 250 PS 637: Performed by: HOSPITALIST

## 2022-05-31 PROCEDURE — 200N000001 HC R&B ICU

## 2022-05-31 PROCEDURE — 250N000013 HC RX MED GY IP 250 OP 250 PS 637: Performed by: INTERNAL MEDICINE

## 2022-05-31 PROCEDURE — 250N000011 HC RX IP 250 OP 636: Performed by: NURSE PRACTITIONER

## 2022-05-31 PROCEDURE — 250N000013 HC RX MED GY IP 250 OP 250 PS 637

## 2022-05-31 PROCEDURE — 83036 HEMOGLOBIN GLYCOSYLATED A1C: CPT | Performed by: INTERNAL MEDICINE

## 2022-05-31 PROCEDURE — 84100 ASSAY OF PHOSPHORUS: CPT | Performed by: SURGERY

## 2022-05-31 PROCEDURE — 94640 AIRWAY INHALATION TREATMENT: CPT | Mod: 76

## 2022-05-31 PROCEDURE — 82565 ASSAY OF CREATININE: CPT

## 2022-05-31 RX ORDER — FUROSEMIDE 10 MG/ML
40 INJECTION INTRAMUSCULAR; INTRAVENOUS ONCE
Status: COMPLETED | OUTPATIENT
Start: 2022-05-31 | End: 2022-06-01

## 2022-05-31 RX ADMIN — Medication 15 ML: at 08:09

## 2022-05-31 RX ADMIN — POTASSIUM & SODIUM PHOSPHATES POWDER PACK 280-160-250 MG 1 PACKET: 280-160-250 PACK at 21:31

## 2022-05-31 RX ADMIN — HYDROCORTISONE SODIUM SUCCINATE 50 MG: 100 INJECTION, POWDER, FOR SOLUTION INTRAMUSCULAR; INTRAVENOUS at 01:28

## 2022-05-31 RX ADMIN — CALCIUM CARBONATE 1250 MG: 1250 SUSPENSION ORAL at 19:41

## 2022-05-31 RX ADMIN — POTASSIUM & SODIUM PHOSPHATES POWDER PACK 280-160-250 MG 1 PACKET: 280-160-250 PACK at 19:41

## 2022-05-31 RX ADMIN — BRIVARACETAM 100 MG: 10 SOLUTION ORAL at 08:09

## 2022-05-31 RX ADMIN — METOCLOPRAMIDE HYDROCHLORIDE 10 MG: 5 SOLUTION ORAL at 16:01

## 2022-05-31 RX ADMIN — ASPIRIN 81 MG CHEWABLE TABLET 81 MG: 81 TABLET CHEWABLE at 08:09

## 2022-05-31 RX ADMIN — POTASSIUM & SODIUM PHOSPHATES POWDER PACK 280-160-250 MG 1 PACKET: 280-160-250 PACK at 16:00

## 2022-05-31 RX ADMIN — CARBAMAZEPINE 150 MG: 100 SUSPENSION ORAL at 05:43

## 2022-05-31 RX ADMIN — CARBAMAZEPINE 150 MG: 100 SUSPENSION ORAL at 00:19

## 2022-05-31 RX ADMIN — ACETYLCYSTEINE 2 ML: 200 SOLUTION ORAL; RESPIRATORY (INHALATION) at 11:44

## 2022-05-31 RX ADMIN — SODIUM CHLORIDE: 9 INJECTION, SOLUTION INTRAVENOUS at 04:13

## 2022-05-31 RX ADMIN — POTASSIUM & SODIUM PHOSPHATES POWDER PACK 280-160-250 MG 1 PACKET: 280-160-250 PACK at 09:40

## 2022-05-31 RX ADMIN — ACETAMINOPHEN 650 MG: 325 TABLET ORAL at 01:42

## 2022-05-31 RX ADMIN — CARBAMAZEPINE 150 MG: 100 SUSPENSION ORAL at 11:04

## 2022-05-31 RX ADMIN — Medication 1 MG: at 01:42

## 2022-05-31 RX ADMIN — SODIUM CHLORIDE 6 UNITS/HR: 9 INJECTION, SOLUTION INTRAVENOUS at 08:14

## 2022-05-31 RX ADMIN — METOCLOPRAMIDE HYDROCHLORIDE 10 MG: 5 SOLUTION ORAL at 08:09

## 2022-05-31 RX ADMIN — METOCLOPRAMIDE HYDROCHLORIDE 10 MG: 5 SOLUTION ORAL at 11:04

## 2022-05-31 RX ADMIN — BRIVARACETAM 100 MG: 10 SOLUTION ORAL at 20:21

## 2022-05-31 RX ADMIN — POTASSIUM & SODIUM PHOSPHATES POWDER PACK 280-160-250 MG 1 PACKET: 280-160-250 PACK at 08:09

## 2022-05-31 RX ADMIN — CALCIUM CARBONATE 1250 MG: 1250 SUSPENSION ORAL at 13:23

## 2022-05-31 RX ADMIN — OXYCODONE HYDROCHLORIDE AND ACETAMINOPHEN 1000 MG: 500 TABLET ORAL at 08:09

## 2022-05-31 RX ADMIN — LEVOTHYROXINE SODIUM 150 MCG: 100 TABLET ORAL at 08:09

## 2022-05-31 RX ADMIN — ACETYLCYSTEINE 2 ML: 200 SOLUTION ORAL; RESPIRATORY (INHALATION) at 19:20

## 2022-05-31 RX ADMIN — Medication 40 MG: at 08:09

## 2022-05-31 RX ADMIN — Medication 50 MCG: at 08:09

## 2022-05-31 RX ADMIN — ALBUTEROL SULFATE 2.5 MG: 2.5 SOLUTION RESPIRATORY (INHALATION) at 22:55

## 2022-05-31 RX ADMIN — CEFTRIAXONE SODIUM 2 G: 2 INJECTION, POWDER, FOR SOLUTION INTRAMUSCULAR; INTRAVENOUS at 13:22

## 2022-05-31 RX ADMIN — POTASSIUM & SODIUM PHOSPHATES POWDER PACK 280-160-250 MG 1 PACKET: 280-160-250 PACK at 13:23

## 2022-05-31 RX ADMIN — SODIUM CHLORIDE: 9 INJECTION, SOLUTION INTRAVENOUS at 02:44

## 2022-05-31 RX ADMIN — ALBUTEROL SULFATE 2.5 MG: 2.5 SOLUTION RESPIRATORY (INHALATION) at 15:43

## 2022-05-31 RX ADMIN — ACETYLCYSTEINE 2 ML: 200 SOLUTION ORAL; RESPIRATORY (INHALATION) at 07:02

## 2022-05-31 RX ADMIN — DEXTROSE MONOHYDRATE: 50 INJECTION, SOLUTION INTRAVENOUS at 02:39

## 2022-05-31 RX ADMIN — HYDROCORTISONE SODIUM SUCCINATE 50 MG: 100 INJECTION, POWDER, FOR SOLUTION INTRAMUSCULAR; INTRAVENOUS at 21:23

## 2022-05-31 RX ADMIN — HYDROCORTISONE SODIUM SUCCINATE 50 MG: 100 INJECTION, POWDER, FOR SOLUTION INTRAMUSCULAR; INTRAVENOUS at 08:11

## 2022-05-31 RX ADMIN — CHLORHEXIDINE GLUCONATE 15 ML: 1.2 SOLUTION ORAL at 08:08

## 2022-05-31 RX ADMIN — ALBUTEROL SULFATE 2.5 MG: 2.5 SOLUTION RESPIRATORY (INHALATION) at 19:19

## 2022-05-31 RX ADMIN — CHLORHEXIDINE GLUCONATE 15 ML: 1.2 SOLUTION ORAL at 19:51

## 2022-05-31 RX ADMIN — SODIUM CHLORIDE 5 UNITS/HR: 9 INJECTION, SOLUTION INTRAVENOUS at 00:20

## 2022-05-31 RX ADMIN — ALBUTEROL SULFATE 2.5 MG: 2.5 SOLUTION RESPIRATORY (INHALATION) at 07:01

## 2022-05-31 RX ADMIN — ACETYLCYSTEINE 2 ML: 200 SOLUTION ORAL; RESPIRATORY (INHALATION) at 15:43

## 2022-05-31 RX ADMIN — ACETAMINOPHEN 650 MG: 325 TABLET ORAL at 21:31

## 2022-05-31 RX ADMIN — CARBAMAZEPINE 100 MG: 100 SUSPENSION ORAL at 17:51

## 2022-05-31 RX ADMIN — ALBUTEROL SULFATE 2.5 MG: 2.5 SOLUTION RESPIRATORY (INHALATION) at 11:44

## 2022-05-31 RX ADMIN — METOCLOPRAMIDE HYDROCHLORIDE 10 MG: 5 SOLUTION ORAL at 21:31

## 2022-05-31 ASSESSMENT — ACTIVITIES OF DAILY LIVING (ADL)
ADLS_ACUITY_SCORE: 51
ADLS_ACUITY_SCORE: 55
ADLS_ACUITY_SCORE: 51

## 2022-05-31 NOTE — PROGRESS NOTES
Nebs given as ordered. LS are coarse and diminished t/o and pt is on L. Will continue to follow.  Red Brown

## 2022-05-31 NOTE — PROGRESS NOTES
CLINICAL NUTRITION SERVICES - REASSESSMENT NOTE      Recommendations Ordered by Registered Dietitian (RD): Continue Jevity 1.5 at 60 mL/hr as above  Add Banatrol 2 pkts per day = 80 kcal and 4 g fiber   Total (TF + D5 IVF + Banatrol)= 2468 kcal (34 kcal/kg and 112% needs), 32 g fiber    Malnutrition: (5/27)  % Weight Loss:  None noted - wt appears stable in the 160s  % Intake:  No decreased intake noted  Subcutaneous Fat Loss:  (5/12/22 - RD assessment) Orbital region moderate depletion- baseline  Muscle Loss:  (5/12/22 - RD Assessment) Temporal region moderate depletion and Clavicle bone region moderate depletion - baseline  Fluid Retention:  None noted     Malnutrition Diagnosis: Patient does not meet two of the above criteria necessary for diagnosing malnutrition       EVALUATION OF PROGRESS TOWARD GOALS   Diet:  NPO    Nutrition Support:  Patient resumed TF on 5/27 and continues as follows ~    Nutrition Support Enteral:  Type of Feeding Tube: Existing G-J tube   Enteral Frequency:  Continuous  Enteral Regimen: Jevity 1.5 at 60 mL/hr x 22 hours per day (holding TF 1 hour before and 1 hour after Synthroid dose)  Total Enteral Provisions: 1980 kcal (27 kcal/kg), 90 g protein (1.2 g/kg), 28 g fiber, 1003 mL H2O  Free Water Flush: 200 mL every 4 hours       Intake/Tolerance:    K and Mg normal  Phos 2.3 (L) - getting replacement   Na 140 (NL) - started on D5  mL/hr on 5/29 (120 g CHO, 408 kcal)  BGM  on Insulin drip   I/O 5476/2825, wt 84.2 kg (up from admit - likely fluids)  Stool 1100 mL yest, 800 mL on 5/29, and x 8 on 5/28    Noted TF was off on 5/29 and then back on again today -- no documentation as to why TF was being held       ASSESSED NUTRITION NEEDS:  Dosing Weight 73.6 kg  Estimated Energy Needs: 8966-6300 kcals (25-30 Kcal/Kg)  Justification: maintenance  Estimated Protein Needs:  grams protein (1.2-1.5 g pro/Kg)  Justification: maintenance  Estimated Fluid Needs: 1  mL/kcal  Justification: maintenance      NEW FINDINGS:   5/27:  Intubated   5/30:  Extubated     Previous Goals (5/27):   EN to meet % estimated needs  Evaluation: Met    Previous Nutrition Diagnosis (5/27):   Inadequate protein-energy intake related to NPO status and TF not resumed yet as evidenced by pt not meeting estimated needs  Evaluation: Resolved       CURRENT NUTRITION DIAGNOSIS  Altered GI function related to unknown etiology as evidenced by liquid stool over the last 3 days, need for fiber modular     INTERVENTIONS  Recommendations / Nutrition Prescription  Continue Jevity 1.5 at 60 mL/hr as above  Add Banatrol 2 pkts per day = 80 kcal and 4 g fiber   Total (TF + D5 IVF + Banatrol)= 2468 kcal (34 kcal/kg and 112% needs), 32 g fiber     Implementation  Medical Food Supplement:  Banatrol ordered as above   Collaboration and Referral of Nutrition care:  Patient discussed today during interdisciplinary bedside rounds     Goals  TF + D5 IVF + Banatrol will meet % estimated needs   Stooling volume will decrease with addition of Banatrol     MONITORING AND EVALUATION:  Progress towards goals will be monitored and evaluated per protocol and Practice Guidelines    Swati Parrish RD, LD, CNSC   Clinical Dietitian - [facility]
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
## 2022-06-01 LAB
ATRIAL RATE - MUSE: 136 BPM
ATRIAL RATE - MUSE: 98 BPM
BACTERIA BLD CULT: NO GROWTH
BACTERIA BLD CULT: NO GROWTH
CREAT SERPL-MCNC: 0.71 MG/DL (ref 0.66–1.25)
DIASTOLIC BLOOD PRESSURE - MUSE: NORMAL MMHG
DIASTOLIC BLOOD PRESSURE - MUSE: NORMAL MMHG
GFR SERPL CREATININE-BSD FRML MDRD: >90 ML/MIN/1.73M2
GLUCOSE BLDC GLUCOMTR-MCNC: 105 MG/DL (ref 70–99)
GLUCOSE BLDC GLUCOMTR-MCNC: 118 MG/DL (ref 70–99)
GLUCOSE BLDC GLUCOMTR-MCNC: 130 MG/DL (ref 70–99)
GLUCOSE BLDC GLUCOMTR-MCNC: 160 MG/DL (ref 70–99)
GLUCOSE BLDC GLUCOMTR-MCNC: 168 MG/DL (ref 70–99)
GLUCOSE BLDC GLUCOMTR-MCNC: 182 MG/DL (ref 70–99)
INTERPRETATION ECG - MUSE: NORMAL
INTERPRETATION ECG - MUSE: NORMAL
MAGNESIUM SERPL-MCNC: 2.2 MG/DL (ref 1.6–2.3)
P AXIS - MUSE: 63 DEGREES
P AXIS - MUSE: 64 DEGREES
PHOSPHATE SERPL-MCNC: 3.3 MG/DL (ref 2.5–4.5)
POTASSIUM BLD-SCNC: 3.8 MMOL/L (ref 3.4–5.3)
PR INTERVAL - MUSE: 130 MS
PR INTERVAL - MUSE: 222 MS
QRS DURATION - MUSE: 108 MS
QRS DURATION - MUSE: 86 MS
QT - MUSE: 270 MS
QT - MUSE: 366 MS
QTC - MUSE: 344 MS
QTC - MUSE: 550 MS
R AXIS - MUSE: -32 DEGREES
R AXIS - MUSE: 46 DEGREES
SODIUM SERPL-SCNC: 142 MMOL/L (ref 133–144)
SYSTOLIC BLOOD PRESSURE - MUSE: NORMAL MMHG
SYSTOLIC BLOOD PRESSURE - MUSE: NORMAL MMHG
T AXIS - MUSE: 82 DEGREES
T AXIS - MUSE: 84 DEGREES
VENTRICULAR RATE- MUSE: 136 BPM
VENTRICULAR RATE- MUSE: 98 BPM

## 2022-06-01 PROCEDURE — 250N000011 HC RX IP 250 OP 636: Performed by: INTERNAL MEDICINE

## 2022-06-01 PROCEDURE — 84100 ASSAY OF PHOSPHORUS: CPT | Performed by: INTERNAL MEDICINE

## 2022-06-01 PROCEDURE — 250N000013 HC RX MED GY IP 250 OP 250 PS 637: Performed by: INTERNAL MEDICINE

## 2022-06-01 PROCEDURE — 250N000009 HC RX 250: Performed by: HOSPITALIST

## 2022-06-01 PROCEDURE — 94640 AIRWAY INHALATION TREATMENT: CPT

## 2022-06-01 PROCEDURE — 120N000001 HC R&B MED SURG/OB

## 2022-06-01 PROCEDURE — 999N000157 HC STATISTIC RCP TIME EA 10 MIN

## 2022-06-01 PROCEDURE — 83735 ASSAY OF MAGNESIUM: CPT | Performed by: INTERNAL MEDICINE

## 2022-06-01 PROCEDURE — 94640 AIRWAY INHALATION TREATMENT: CPT | Mod: 76

## 2022-06-01 PROCEDURE — 250N000009 HC RX 250: Performed by: INTERNAL MEDICINE

## 2022-06-01 PROCEDURE — 84132 ASSAY OF SERUM POTASSIUM: CPT | Performed by: INTERNAL MEDICINE

## 2022-06-01 PROCEDURE — 84295 ASSAY OF SERUM SODIUM: CPT | Performed by: INTERNAL MEDICINE

## 2022-06-01 PROCEDURE — 250N000013 HC RX MED GY IP 250 OP 250 PS 637: Performed by: SURGERY

## 2022-06-01 PROCEDURE — 250N000013 HC RX MED GY IP 250 OP 250 PS 637

## 2022-06-01 PROCEDURE — 250N000013 HC RX MED GY IP 250 OP 250 PS 637: Performed by: HOSPITALIST

## 2022-06-01 PROCEDURE — 82565 ASSAY OF CREATININE: CPT

## 2022-06-01 PROCEDURE — 99233 SBSQ HOSP IP/OBS HIGH 50: CPT | Performed by: INTERNAL MEDICINE

## 2022-06-01 RX ORDER — POTASSIUM CHLORIDE 20MEQ/15ML
20 LIQUID (ML) ORAL ONCE
Status: COMPLETED | OUTPATIENT
Start: 2022-06-01 | End: 2022-06-01

## 2022-06-01 RX ADMIN — CARBAMAZEPINE 150 MG: 100 SUSPENSION ORAL at 05:40

## 2022-06-01 RX ADMIN — ALBUTEROL SULFATE 2.5 MG: 2.5 SOLUTION RESPIRATORY (INHALATION) at 20:35

## 2022-06-01 RX ADMIN — CARBAMAZEPINE 150 MG: 100 SUSPENSION ORAL at 00:00

## 2022-06-01 RX ADMIN — CHLORHEXIDINE GLUCONATE 15 ML: 1.2 SOLUTION ORAL at 21:43

## 2022-06-01 RX ADMIN — OXYCODONE HYDROCHLORIDE AND ACETAMINOPHEN 1000 MG: 500 TABLET ORAL at 08:13

## 2022-06-01 RX ADMIN — CARBAMAZEPINE 100 MG: 100 SUSPENSION ORAL at 17:11

## 2022-06-01 RX ADMIN — CEFTRIAXONE SODIUM 2 G: 2 INJECTION, POWDER, FOR SOLUTION INTRAMUSCULAR; INTRAVENOUS at 12:45

## 2022-06-01 RX ADMIN — Medication 50 MCG: at 08:13

## 2022-06-01 RX ADMIN — ACETAMINOPHEN 650 MG: 325 TABLET ORAL at 06:07

## 2022-06-01 RX ADMIN — ALBUTEROL SULFATE 2.5 MG: 2.5 SOLUTION RESPIRATORY (INHALATION) at 11:12

## 2022-06-01 RX ADMIN — ALBUTEROL SULFATE 2.5 MG: 2.5 SOLUTION RESPIRATORY (INHALATION) at 07:06

## 2022-06-01 RX ADMIN — Medication 40 MG: at 08:13

## 2022-06-01 RX ADMIN — CALCIUM CARBONATE 1250 MG: 1250 SUSPENSION ORAL at 21:39

## 2022-06-01 RX ADMIN — HYDROCORTISONE SODIUM SUCCINATE 50 MG: 100 INJECTION, POWDER, FOR SOLUTION INTRAMUSCULAR; INTRAVENOUS at 08:13

## 2022-06-01 RX ADMIN — ACETYLCYSTEINE 2 ML: 200 SOLUTION ORAL; RESPIRATORY (INHALATION) at 11:12

## 2022-06-01 RX ADMIN — ACETYLCYSTEINE 2 ML: 200 SOLUTION ORAL; RESPIRATORY (INHALATION) at 20:35

## 2022-06-01 RX ADMIN — Medication 1 MG: at 00:00

## 2022-06-01 RX ADMIN — METOCLOPRAMIDE HYDROCHLORIDE 10 MG: 5 SOLUTION ORAL at 11:33

## 2022-06-01 RX ADMIN — ACETYLCYSTEINE 2 ML: 200 SOLUTION ORAL; RESPIRATORY (INHALATION) at 15:22

## 2022-06-01 RX ADMIN — METOCLOPRAMIDE HYDROCHLORIDE 10 MG: 5 SOLUTION ORAL at 08:13

## 2022-06-01 RX ADMIN — ALBUTEROL SULFATE 2.5 MG: 2.5 SOLUTION RESPIRATORY (INHALATION) at 15:22

## 2022-06-01 RX ADMIN — ACETYLCYSTEINE 2 ML: 200 SOLUTION ORAL; RESPIRATORY (INHALATION) at 07:06

## 2022-06-01 RX ADMIN — HYDROCORTISONE SODIUM SUCCINATE 25 MG: 100 INJECTION, POWDER, FOR SOLUTION INTRAMUSCULAR; INTRAVENOUS at 20:34

## 2022-06-01 RX ADMIN — POTASSIUM & SODIUM PHOSPHATES POWDER PACK 280-160-250 MG 1 PACKET: 280-160-250 PACK at 14:46

## 2022-06-01 RX ADMIN — POTASSIUM & SODIUM PHOSPHATES POWDER PACK 280-160-250 MG 1 PACKET: 280-160-250 PACK at 21:41

## 2022-06-01 RX ADMIN — ASPIRIN 81 MG CHEWABLE TABLET 81 MG: 81 TABLET CHEWABLE at 08:13

## 2022-06-01 RX ADMIN — Medication 1 PACKET: at 08:38

## 2022-06-01 RX ADMIN — METOCLOPRAMIDE HYDROCHLORIDE 10 MG: 5 SOLUTION ORAL at 17:08

## 2022-06-01 RX ADMIN — BRIVARACETAM 100 MG: 10 SOLUTION ORAL at 09:27

## 2022-06-01 RX ADMIN — BRIVARACETAM 100 MG: 10 SOLUTION ORAL at 21:38

## 2022-06-01 RX ADMIN — POTASSIUM & SODIUM PHOSPHATES POWDER PACK 280-160-250 MG 1 PACKET: 280-160-250 PACK at 08:13

## 2022-06-01 RX ADMIN — Medication 1 PACKET: at 11:34

## 2022-06-01 RX ADMIN — FUROSEMIDE 40 MG: 10 INJECTION, SOLUTION INTRAVENOUS at 00:00

## 2022-06-01 RX ADMIN — POTASSIUM CHLORIDE 20 MEQ: 20 SOLUTION ORAL at 05:39

## 2022-06-01 RX ADMIN — LEVOTHYROXINE SODIUM 150 MCG: 100 TABLET ORAL at 08:13

## 2022-06-01 RX ADMIN — Medication 15 ML: at 08:13

## 2022-06-01 RX ADMIN — CHLORHEXIDINE GLUCONATE 15 ML: 1.2 SOLUTION ORAL at 08:13

## 2022-06-01 RX ADMIN — CALCIUM CARBONATE 1250 MG: 1250 SUSPENSION ORAL at 14:46

## 2022-06-01 RX ADMIN — CARBAMAZEPINE 150 MG: 100 SUSPENSION ORAL at 12:45

## 2022-06-01 ASSESSMENT — ACTIVITIES OF DAILY LIVING (ADL)
ADLS_ACUITY_SCORE: 55
ADLS_ACUITY_SCORE: 51
ADLS_ACUITY_SCORE: 55
ADLS_ACUITY_SCORE: 55
ADLS_ACUITY_SCORE: 51
ADLS_ACUITY_SCORE: 55
ADLS_ACUITY_SCORE: 55

## 2022-06-01 NOTE — PROVIDER NOTIFICATION
Updated intensivist on pt's increased weight, coarse lung sounds, decreased UOP, and generalized edema. Order received for 1x Lasix dose.

## 2022-06-01 NOTE — PROGRESS NOTES
[facility]    Internal Medicine Hospitalist Progress Note  06/01/2022  I evaluated patient on the above date.    Luis Westfall Jr., MD  [phone] (p)  Text Page  Vocera        Assessment & Plan New actions/orders today (06/01/2022) are underlined.    Keyon Farias is a 61 year old male with history including TBI with aphasia, R sided spastic hemiplegia and dysphagia with GJ-tube for TFs/meds; seizure disorder; panhypopituitarism; h/o multiple hospital admissions for recurrent pneumonia (last in 12/2021); who presented 5/26/2022 with fever and found with evidence of UTI with sepsis.    On 5/27/2022, RRT called and pt found to be in septic shock and transferred to the ICU and was intubated and started on pressors. BC's subsequently positive for Strep salivarius group, UC only with 50-100K mixed urogenital jaime. Able to be weaned off pressors 5/30/2022. Extubated 5/30/2022.     Ready to be transferred out of the ICU 6/1/2022.      Strep salivarius group bacteremia, unclear source.  UTI.  Septic shock, suspect due to above.  Acute hypoxic respiratory failure, possibly related to septic shock.  Lactic acidosis due to sepsis.  History of recurrent aspiration pneumonia with acute hypoxic respiratory failure and sepsis.  * H/o multiple hospitalizations for aspiration pneumonia; however, last in 12/2021. Has chronic GJ-tube.  * On admit 5/26: WBC 19.5, lactate 6.9, UA showed 180 WBC, lg LE, positive nitrites. Started on pip-tazo and vanco on admit 5/26. BC 5/26 subsequently positive for Strep salivarius group.   UC 5/26 subsequently grew 50-100K mixed urogenital jaime.   * 5/27: RRT called and transferred to ICU for septic shock and required pressors. CT CAP 5/27 showed possible basilar infiltrates vs. artifact of breathing motion at the lung bases; no definite cause for fever and abdominal pain demonstrated in the abdomen or pelvis; stable cystic lesion in the pancreas. Pip-tazo stopped and  started on meropenem 5/27. BC's 5/27 NGTD.  * 5/29: Antibiotics changed to ceftriaxone.  * 5/30: Pressors stopped and extubated.  * 6/1: Off O2.  Recent Labs   Lab 05/29/22  0412 05/28/22  1157 05/28/22  0437 05/27/22  2354 05/27/22  1832 05/27/22  1411 05/27/22  0835 05/27/22  0650 05/26/22  2337 05/26/22  1523 05/26/22  1519   WBC 10.2  --  20.2*  --   --  30.7*  --  28.5*  --  19.5*  --    LACT  --  3.8*  --  6.6* 6.8* 6.9* 0.7  --  1.8  --  1.4   - Continue ceftriaxone (started 5/29) - plan 2 weeks antibiotics for now (stop 6/9).  - Ask ID to see regardign Strep salivarius group bacteremia, appreciate help.  - Continue PTA acetylcysteine + albuterol nebs and PRN nebs.  - Continue PRN O2.  - Continue IS, pulmonary toilet.  - Monitor cultures.    Volume overload, suspect from fluid resuscitation.  * Given IV furosemide x1 for crackles/chest congestion on 6/1 with good response.  - Monitor clinically for need for further diuretics.  - Monitor i/o's, daily wts.    Anemia, suspect dilutional component.  * Hgb normal on admit. No overt clinical signs of major bleeding.  Recent Labs   Lab 05/29/22  0412 05/28/22  0437 05/27/22  1411 05/27/22  0650 05/26/22  1523   HGB 10.6* 11.2* 11.6* 14.0 13.8   - Monitor CBC.  - Consider prbc transfusion if hgb </= 7.0 or if significant bleeding with hemodynamic instability or if symptomatic.    Thrombocytopenia, unclear etiology, possibly medication effect or chronic from other cause.  * Plts normal on admit.  * Plts down to 144K on 5/28.  Recent Labs   Lab 05/29/22  0412 05/28/22  0437 05/27/22  1411 05/27/22  0650 05/26/22  1523   * 144* 168 161 186   - Monitor CBC.  - Consider platelet transfusion if needs a procedure and less than 50,000; or if less than 10,000.    TBI with aphasia, dysphagia, and R sided spastic hemiplegia.   Dysphagia with GJ-tube for TFs.  * Patient's mother is his caregiver, along with home care attendants. Has had multiple hospitalizations for  recurrent pneumonias, but last in 12/2021.  - Continue TF's.  - Continue PTA scheduled metoclopramide.  - Resume PRN scopolamine at discharge.  - Continue PT, OT.    Seizure disorder.  Secondary to TBI. Chronic and stable on PTA AEDs.  - Continue brivaracetam, carbamazepine.    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  * Started on stress dose of IV hydrocortisone in the ICU.  - Change hydrocortisone 50 mg IV q12h --> 25 mg IV q12 and change back to normal PO dosing (15 mg qam and 5 mg qpm at 2 pm) tomorrow 6/2.  - Continue levothyroxine.  - Continue testosterone IM weekly.    GERD.  - Continue pantoprazole.     History of VF and VT cardiac arrest.  History of DVT.  - Noted.    Clinically Significant Risk Factors Present on Admission                      COVID-19 testing.  COVID-19 PCR Results    COVID-19 PCR Results 8/9/21 8/24/21 9/30/21 10/28/21 11/12/21 11/24/21 12/5/21 12/27/21 5/9/22 5/26/22   COVID-19 Virus PCR to [facility] - Result             COVID-19 Virus PCR to [facility] - Source             SARS-CoV-2 Virus Specimen Source             Flu A/B & SARS-COV-2 PCR Source             SARS-CoV-2 PCR Result             SARS CoV2 PCR Negative Negative Negative Negative Negative Negative Negative Negative Negative Negative      Comments are available for some flowsheets but are not being displayed.         COVID-19 Antibody Results, Testing for Immunity    COVID-19 Antibody Results, Testing for Immunity   No data to display.             Diet: NPO for Medical/Clinical Reasons Except for: No Exceptions  Adult Formula Drip Feeding: Continuous Jevity 1.5; Jejunostomy; Goal Rate: 60; mL/hr; Medication - Feeding Tube Flush Frequency: At least 15-30 mL water before and after medication administration and with tube clogging; Amount to Send (Nutrition u...    Prophylaxis: PCD's, ambulation.   Lerma Catheter: Not present  Central Lines: PRESENT  CVC TRIPLE LUMEN Right Femoral-Site Assessment: WDL  Code Status: Full Code    Disposition  "Plan   Expected discharge: 1-2d recommended to prior living arrangement pending improvement in above issues.  Entered: Luis Westfall MD 06/01/2022, 2:11 PM     - Transfer out of the ICU.  - I d/w Dr. Lowery.    Interval History   Doing well. Patient given the \"thumbs up\".  Off O2.  Central line removed.  Given IV furosemide with improvement in crackles/congestion.    -Data reviewed today: I reviewed all new labs and imaging over the last 24 hours. I personally reviewed no images or EKG's today.    Physical Exam    , Blood pressure 119/50, pulse 64, temperature 97.4  F (36.3  C), temperature source Axillary, resp. rate 14, weight 83.1 kg (183 lb 3.2 oz), SpO2 98 %. O2 Device: None (Room air) Oxygen Delivery: 1 LPM  Vitals:    05/31/22 0500 05/31/22 2100 06/01/22 0500   Weight: 84.2 kg (185 lb 10 oz) 86 kg (189 lb 9.5 oz) 83.1 kg (183 lb 3.2 oz)     Vital Signs with Ranges  Temp:  [97  F (36.1  C)-98.1  F (36.7  C)] 97.4  F (36.3  C)  Pulse:  [60-84] 64  Resp:  [10-20] 14  BP: ()/(46-69) 119/50  SpO2:  [97 %-100 %] 98 %  Patient Vitals for the past 24 hrs:   BP Temp Temp src Pulse Resp SpO2 Weight   06/01/22 1112 -- -- -- -- -- 98 % --   06/01/22 0900 119/50 -- -- 64 14 -- --   06/01/22 0800 111/50 97.4  F (36.3  C) Axillary 61 13 99 % --   06/01/22 0706 -- -- -- -- -- 100 % --   06/01/22 0700 111/47 -- -- 60 16 98 % --   06/01/22 0600 117/69 -- -- 79 11 -- --   06/01/22 0500 94/47 -- -- 63 15 100 % 83.1 kg (183 lb 3.2 oz)   06/01/22 0400 124/47 97  F (36.1  C) Axillary 76 14 100 % --   06/01/22 0300 113/51 -- -- 63 15 97 % --   06/01/22 0200 123/55 -- -- 65 12 99 % --   06/01/22 0100 123/52 -- -- 63 17 97 % --   06/01/22 0000 129/65 98.1  F (36.7  C) Axillary 72 18 100 % --   05/31/22 2300 121/63 -- -- 69 15 100 % --   05/31/22 2200 119/56 -- -- 63 19 100 % --   05/31/22 2100 107/49 -- -- 63 18 99 % 86 kg (189 lb 9.5 oz)   05/31/22 2000 108/48 -- -- 63 13 100 % --   05/31/22 1947 -- 97.3  F (36.3  C) " Axillary -- -- -- --   05/31/22 1920 -- -- -- -- -- 100 % --   05/31/22 1900 106/50 -- -- 62 15 100 % --   05/31/22 1800 121/61 -- -- 78 20 100 % --   05/31/22 1700 106/64 -- -- 84 10 99 % --   05/31/22 1600 104/46 -- -- 67 15 99 % --   05/31/22 1543 -- -- -- -- -- 97 % --   05/31/22 1500 106/49 -- -- 64 19 100 % --     I/O's Last 24 hours  I/O last 3 completed shifts:  In: 3939.2 [I.V.:999.2; NG/GT:1500]  Out: 4555 [Urine:3655; Stool:900]    Constitutional: Awake, alert, aphasic.  Respiratory: Diminished in bases. No crackles or wheezes.  Cardiovascular: RRR, no m/r/g.  GI: Soft, nt, nd, +BS.  Skin/Integumen: Bilateral lower extremities - PCD's in place, no pitting pedal edema.  Other:        Data   Recent Labs   Lab 06/01/22  1136 06/01/22  0812 06/01/22  0335 06/01/22  0334 05/31/22  2036 05/31/22  1758 05/31/22  1332 05/31/22  1221 05/31/22  0655 05/31/22  0540 05/30/22  1332 05/30/22  1131 05/30/22  0614 05/30/22  0425 05/29/22  1148 05/29/22  1145 05/29/22  0500 05/29/22  0412 05/28/22  0807 05/28/22  0437 05/27/22  1426 05/27/22  1411 05/27/22  0835   WBC  --   --   --   --   --   --   --   --   --   --   --   --   --   --   --   --   --  10.2  --  20.2*  --  30.7*  --    HGB  --   --   --   --   --   --   --   --   --   --   --   --   --   --   --   --   --  10.6*  --  11.2*  --  11.6*  --    MCV  --   --   --   --   --   --   --   --   --   --   --   --   --   --   --   --   --  93  --  94  --  93  --    PLT  --   --   --   --   --   --   --   --   --   --   --   --   --   --   --   --   --  143*  --  144*  --  168  --    NA  --   --  142  --   --  139  --  140  --  140   < > 147*  --  149*   < > 157*  --  159*   < > 144  --  136  --    POTASSIUM  --   --  3.8  --   --   --   --   --   --  4.1  --  4.2  --  3.5   < > 3.5  --  4.5   < > 2.1*  --  4.5  --    CHLORIDE  --   --   --   --   --   --   --   --   --   --   --   --   --  118*  --  126*  --  130*   < > 110*  --  106  --    CO2  --   --   --   --   --    --   --   --   --   --   --   --   --  28  --  27  --  28   < > 24  --  19*  --    BUN  --   --   --   --   --   --   --   --   --   --   --   --   --  9  --  10  --  11   < > 17  --  19  --    CR  --   --  0.71  --   --   --   --   --   --  0.71  --   --   --  0.74  --  0.81  --  0.83   < > 1.08  --  1.32*  --    ANIONGAP  --   --   --   --   --   --   --   --   --   --   --   --   --  3  --  4  --  1*   < > 10  --  11  --    STEVE  --   --   --   --   --   --   --   --   --   --   --   --   --  7.0*  --  7.6*  --  8.1*   < > 7.6*  --  7.4*  --    * 130*  --  168*   < >  --    < >  --    < > 121*   < >  --    < > 121*   < > 172*   < > 183*   < > 356*   < > 156*  --    ALBUMIN  --   --   --   --   --   --   --   --   --   --   --   --   --   --   --   --   --   --   --   --   --  2.1* 2.5*   PROTTOTAL  --   --   --   --   --   --   --   --   --   --   --   --   --   --   --   --   --   --   --   --   --  5.5* 6.4*   BILITOTAL  --   --   --   --   --   --   --   --   --   --   --   --   --   --   --   --   --   --   --   --   --  1.2 1.7*   ALKPHOS  --   --   --   --   --   --   --   --   --   --   --   --   --   --   --   --   --   --   --   --   --  84 106   ALT  --   --   --   --   --   --   --   --   --   --   --   --   --   --   --   --   --   --   --   --   --  21 26   AST  --   --   --   --   --   --   --   --   --   --   --   --   --   --   --   --   --   --   --   --   --  42 50*    < > = values in this interval not displayed.     Recent Labs   Lab Test 06/01/22  1136 06/01/22  0812 06/01/22  0334 06/01/22  0045 05/31/22  2036 07/21/21  1106 05/25/21  1255 05/25/21  0746 05/25/21  0429 05/24/21  2358 05/24/21  2041   * 130* 168* 182* 117*   < >  --   --   --   --   --    BGM  --   --   --   --   --   --  96 92 136* 163* 155*    < > = values in this interval not displayed.     Recent Labs   Lab 05/29/22  0412 05/28/22  1157 05/28/22  0437 05/27/22  2354 05/27/22  1832 05/27/22  1411  05/27/22  0835 05/27/22  0650 05/26/22  2337 05/26/22  1523   WBC 10.2  --  20.2*  --   --  30.7*  --  28.5*  --  19.5*   LACT  --  3.8*  --  6.6* 6.8* 6.9* 0.7  --  1.8  --          No results found for this or any previous visit (from the past 24 hour(s)).    Medications   All medications were reviewed.    dextrose       dextrose       norepinephrine Stopped (05/30/22 0724)     sodium chloride 20 mL/hr at 05/31/22 0413     vasopressin Stopped (05/27/22 2029)       acetylcysteine  2 mL Nebulization 4x Daily     albuterol  2.5 mg Nebulization Q4H While awake     aspirin  81 mg Oral or Feeding Tube Daily     banatrol plus  1 packet Per Feeding Tube BID 09 12     Brivaracetam  100 mg Oral or Feeding Tube BID     calcium carbonate  1,250 mg Oral or Feeding Tube TID     carBAMazepine  100 mg Oral or Feeding Tube Daily     carBAMazepine  150 mg Oral or Feeding Tube TID     cefTRIAXone  2 g Intravenous Q24H     chlorhexidine  15 mL Swish & Spit BID     [Held by provider] hydrocortisone  15 mg Oral or Feeding Tube QAM     [Held by provider] hydrocortisone  5 mg Oral Daily     hydrocortisone sodium succinate PF  50 mg Intravenous BID     insulin aspart  1-12 Units Subcutaneous Q4H     levothyroxine  150 mcg Oral or Feeding Tube Daily     metoclopramide  10 mg Oral or Feeding Tube 4x Daily AC & HS     multivitamins w/minerals  15 mL Oral or Feeding Tube Daily     pantoprazole  40 mg Per J Tube Daily     potassium & sodium phosphates  1 packet Oral or Feeding Tube TID     sodium chloride (PF)  10-40 mL Intracatheter Q7 Days     sodium chloride (PF)  3 mL Intracatheter Q8H     vitamin C  1,000 mg Oral or Feeding Tube Daily     vitamin D3  50 mcg Oral or Feeding Tube Daily     acetaminophen **OR** acetaminophen, bisacodyl, dextrose, dextrose, glucose **OR** dextrose **OR** glucagon, lidocaine 4%, melatonin, naloxone **OR** naloxone **OR** naloxone **OR** naloxone, nitroGLYcerin, ondansetron **OR** ondansetron, senna-docusate  **OR** senna-docusate, sodium chloride (PF), sodium chloride (PF), sodium chloride (PF), sodium chloride (PF)
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
## 2022-06-02 ENCOUNTER — APPOINTMENT (OUTPATIENT)
Dept: CT IMAGING | Facility: CLINIC | Age: 62
DRG: 871 | End: 2022-06-02
Attending: HOSPITALIST
Payer: MEDICARE

## 2022-06-02 ENCOUNTER — APPOINTMENT (OUTPATIENT)
Dept: CARDIOLOGY | Facility: CLINIC | Age: 62
DRG: 871 | End: 2022-06-02
Attending: INTERNAL MEDICINE
Payer: MEDICARE

## 2022-06-02 LAB
ANION GAP SERPL CALCULATED.3IONS-SCNC: 3 MMOL/L (ref 3–14)
BACTERIA BLD CULT: ABNORMAL
BACTERIA BLD CULT: ABNORMAL
BASOPHILS # BLD AUTO: 0 10E3/UL (ref 0–0.2)
BASOPHILS NFR BLD AUTO: 1 %
BUN SERPL-MCNC: 21 MG/DL (ref 7–30)
CALCIUM SERPL-MCNC: 7.3 MG/DL (ref 8.5–10.1)
CHLORIDE BLD-SCNC: 111 MMOL/L (ref 94–109)
CO2 SERPL-SCNC: 28 MMOL/L (ref 20–32)
CREAT SERPL-MCNC: 0.64 MG/DL (ref 0.66–1.25)
EOSINOPHIL # BLD AUTO: 0.4 10E3/UL (ref 0–0.7)
EOSINOPHIL NFR BLD AUTO: 5 %
ERYTHROCYTE [DISTWIDTH] IN BLOOD BY AUTOMATED COUNT: 13.5 % (ref 10–15)
GFR SERPL CREATININE-BSD FRML MDRD: >90 ML/MIN/1.73M2
GLUCOSE BLD-MCNC: 76 MG/DL (ref 70–99)
GLUCOSE BLDC GLUCOMTR-MCNC: 115 MG/DL (ref 70–99)
GLUCOSE BLDC GLUCOMTR-MCNC: 116 MG/DL (ref 70–99)
GLUCOSE BLDC GLUCOMTR-MCNC: 117 MG/DL (ref 70–99)
GLUCOSE BLDC GLUCOMTR-MCNC: 121 MG/DL (ref 70–99)
GLUCOSE BLDC GLUCOMTR-MCNC: 168 MG/DL (ref 70–99)
GLUCOSE BLDC GLUCOMTR-MCNC: 82 MG/DL (ref 70–99)
HCT VFR BLD AUTO: 32.9 % (ref 40–53)
HGB BLD-MCNC: 10.5 G/DL (ref 13.3–17.7)
IMM GRANULOCYTES # BLD: 0.3 10E3/UL
IMM GRANULOCYTES NFR BLD: 4 %
LVEF ECHO: NORMAL
LYMPHOCYTES # BLD AUTO: 3.2 10E3/UL (ref 0.8–5.3)
LYMPHOCYTES NFR BLD AUTO: 37 %
MAGNESIUM SERPL-MCNC: 2.5 MG/DL (ref 1.6–2.3)
MCH RBC QN AUTO: 29.7 PG (ref 26.5–33)
MCHC RBC AUTO-ENTMCNC: 31.9 G/DL (ref 31.5–36.5)
MCV RBC AUTO: 93 FL (ref 78–100)
MONOCYTES # BLD AUTO: 0.9 10E3/UL (ref 0–1.3)
MONOCYTES NFR BLD AUTO: 10 %
NEUTROPHILS # BLD AUTO: 3.9 10E3/UL (ref 1.6–8.3)
NEUTROPHILS NFR BLD AUTO: 43 %
NRBC # BLD AUTO: 0 10E3/UL
NRBC BLD AUTO-RTO: 0 /100
PHOSPHATE SERPL-MCNC: 3 MG/DL (ref 2.5–4.5)
PLATELET # BLD AUTO: 165 10E3/UL (ref 150–450)
POTASSIUM BLD-SCNC: 4.1 MMOL/L (ref 3.4–5.3)
RBC # BLD AUTO: 3.54 10E6/UL (ref 4.4–5.9)
SODIUM SERPL-SCNC: 142 MMOL/L (ref 133–144)
WBC # BLD AUTO: 8.8 10E3/UL (ref 4–11)

## 2022-06-02 PROCEDURE — 250N000009 HC RX 250: Performed by: HOSPITALIST

## 2022-06-02 PROCEDURE — 93306 TTE W/DOPPLER COMPLETE: CPT

## 2022-06-02 PROCEDURE — 80048 BASIC METABOLIC PNL TOTAL CA: CPT | Performed by: INTERNAL MEDICINE

## 2022-06-02 PROCEDURE — 250N000013 HC RX MED GY IP 250 OP 250 PS 637: Performed by: INTERNAL MEDICINE

## 2022-06-02 PROCEDURE — 94640 AIRWAY INHALATION TREATMENT: CPT | Mod: 76

## 2022-06-02 PROCEDURE — 99232 SBSQ HOSP IP/OBS MODERATE 35: CPT | Performed by: NURSE PRACTITIONER

## 2022-06-02 PROCEDURE — 250N000009 HC RX 250: Performed by: INTERNAL MEDICINE

## 2022-06-02 PROCEDURE — 83735 ASSAY OF MAGNESIUM: CPT | Performed by: INTERNAL MEDICINE

## 2022-06-02 PROCEDURE — 120N000001 HC R&B MED SURG/OB

## 2022-06-02 PROCEDURE — 250N000011 HC RX IP 250 OP 636: Performed by: INTERNAL MEDICINE

## 2022-06-02 PROCEDURE — 250N000011 HC RX IP 250 OP 636: Performed by: HOSPITALIST

## 2022-06-02 PROCEDURE — 93306 TTE W/DOPPLER COMPLETE: CPT | Mod: 26 | Performed by: INTERNAL MEDICINE

## 2022-06-02 PROCEDURE — 94640 AIRWAY INHALATION TREATMENT: CPT

## 2022-06-02 PROCEDURE — 999N000157 HC STATISTIC RCP TIME EA 10 MIN

## 2022-06-02 PROCEDURE — 36415 COLL VENOUS BLD VENIPUNCTURE: CPT | Performed by: INTERNAL MEDICINE

## 2022-06-02 PROCEDURE — 85025 COMPLETE CBC W/AUTO DIFF WBC: CPT | Performed by: INTERNAL MEDICINE

## 2022-06-02 PROCEDURE — G1004 CDSM NDSC: HCPCS

## 2022-06-02 PROCEDURE — 250N000013 HC RX MED GY IP 250 OP 250 PS 637: Performed by: HOSPITALIST

## 2022-06-02 PROCEDURE — 84100 ASSAY OF PHOSPHORUS: CPT | Performed by: INTERNAL MEDICINE

## 2022-06-02 RX ORDER — ACETAMINOPHEN 325 MG/10.15ML
650 LIQUID ORAL EVERY 6 HOURS PRN
Status: DISCONTINUED | OUTPATIENT
Start: 2022-06-02 | End: 2022-06-08 | Stop reason: HOSPADM

## 2022-06-02 RX ORDER — HYDROCORTISONE 5 MG/1
5 TABLET ORAL DAILY
Status: DISCONTINUED | OUTPATIENT
Start: 2022-06-02 | End: 2022-06-08 | Stop reason: HOSPADM

## 2022-06-02 RX ORDER — ACETAMINOPHEN 650 MG/1
650 SUPPOSITORY RECTAL EVERY 6 HOURS PRN
Status: DISCONTINUED | OUTPATIENT
Start: 2022-06-02 | End: 2022-06-08 | Stop reason: HOSPADM

## 2022-06-02 RX ORDER — IOPAMIDOL 755 MG/ML
75 INJECTION, SOLUTION INTRAVASCULAR ONCE
Status: COMPLETED | OUTPATIENT
Start: 2022-06-02 | End: 2022-06-02

## 2022-06-02 RX ADMIN — ACETYLCYSTEINE 2 ML: 200 SOLUTION ORAL; RESPIRATORY (INHALATION) at 19:35

## 2022-06-02 RX ADMIN — IOPAMIDOL 75 ML: 755 INJECTION, SOLUTION INTRAVENOUS at 12:40

## 2022-06-02 RX ADMIN — ACETYLCYSTEINE 2 ML: 200 SOLUTION ORAL; RESPIRATORY (INHALATION) at 15:33

## 2022-06-02 RX ADMIN — Medication 1 PACKET: at 13:14

## 2022-06-02 RX ADMIN — Medication 50 MCG: at 08:47

## 2022-06-02 RX ADMIN — CARBAMAZEPINE 150 MG: 100 SUSPENSION ORAL at 05:31

## 2022-06-02 RX ADMIN — CALCIUM CARBONATE 1250 MG: 1250 SUSPENSION ORAL at 20:18

## 2022-06-02 RX ADMIN — OXYCODONE HYDROCHLORIDE AND ACETAMINOPHEN 1000 MG: 500 TABLET ORAL at 08:47

## 2022-06-02 RX ADMIN — BRIVARACETAM 100 MG: 10 SOLUTION ORAL at 08:48

## 2022-06-02 RX ADMIN — ALBUTEROL SULFATE 2.5 MG: 2.5 SOLUTION RESPIRATORY (INHALATION) at 15:33

## 2022-06-02 RX ADMIN — SODIUM CHLORIDE 60 ML: 900 INJECTION INTRAVENOUS at 12:41

## 2022-06-02 RX ADMIN — METOCLOPRAMIDE HYDROCHLORIDE 10 MG: 5 SOLUTION ORAL at 08:47

## 2022-06-02 RX ADMIN — CARBAMAZEPINE 150 MG: 100 SUSPENSION ORAL at 13:21

## 2022-06-02 RX ADMIN — POTASSIUM & SODIUM PHOSPHATES POWDER PACK 280-160-250 MG 1 PACKET: 280-160-250 PACK at 08:47

## 2022-06-02 RX ADMIN — ASPIRIN 81 MG CHEWABLE TABLET 81 MG: 81 TABLET CHEWABLE at 08:47

## 2022-06-02 RX ADMIN — CHLORHEXIDINE GLUCONATE 15 ML: 1.2 SOLUTION ORAL at 08:47

## 2022-06-02 RX ADMIN — HYDROCORTISONE 15 MG: 10 TABLET ORAL at 08:47

## 2022-06-02 RX ADMIN — METOCLOPRAMIDE HYDROCHLORIDE 10 MG: 5 SOLUTION ORAL at 00:15

## 2022-06-02 RX ADMIN — CARBAMAZEPINE 150 MG: 100 SUSPENSION ORAL at 00:15

## 2022-06-02 RX ADMIN — CARBAMAZEPINE 100 MG: 100 SUSPENSION ORAL at 18:33

## 2022-06-02 RX ADMIN — ALBUTEROL SULFATE 2.5 MG: 2.5 SOLUTION RESPIRATORY (INHALATION) at 11:13

## 2022-06-02 RX ADMIN — BRIVARACETAM 100 MG: 10 SOLUTION ORAL at 20:17

## 2022-06-02 RX ADMIN — HYDROCORTISONE 5 MG: 5 TABLET ORAL at 16:24

## 2022-06-02 RX ADMIN — Medication 40 MG: at 08:48

## 2022-06-02 RX ADMIN — ALBUTEROL SULFATE 2.5 MG: 2.5 SOLUTION RESPIRATORY (INHALATION) at 19:36

## 2022-06-02 RX ADMIN — Medication 1 PACKET: at 16:24

## 2022-06-02 RX ADMIN — ACETYLCYSTEINE 2 ML: 200 SOLUTION ORAL; RESPIRATORY (INHALATION) at 07:07

## 2022-06-02 RX ADMIN — LEVOTHYROXINE SODIUM 150 MCG: 100 TABLET ORAL at 06:43

## 2022-06-02 RX ADMIN — POTASSIUM & SODIUM PHOSPHATES POWDER PACK 280-160-250 MG 1 PACKET: 280-160-250 PACK at 20:17

## 2022-06-02 RX ADMIN — CALCIUM CARBONATE 1250 MG: 1250 SUSPENSION ORAL at 16:24

## 2022-06-02 RX ADMIN — CEFTRIAXONE SODIUM 2 G: 2 INJECTION, POWDER, FOR SOLUTION INTRAMUSCULAR; INTRAVENOUS at 13:21

## 2022-06-02 RX ADMIN — POTASSIUM & SODIUM PHOSPHATES POWDER PACK 280-160-250 MG 1 PACKET: 280-160-250 PACK at 16:24

## 2022-06-02 RX ADMIN — ACETYLCYSTEINE 2 ML: 200 SOLUTION ORAL; RESPIRATORY (INHALATION) at 11:13

## 2022-06-02 RX ADMIN — Medication 15 ML: at 08:48

## 2022-06-02 RX ADMIN — ALBUTEROL SULFATE 2.5 MG: 2.5 SOLUTION RESPIRATORY (INHALATION) at 07:07

## 2022-06-02 RX ADMIN — CALCIUM CARBONATE 1250 MG: 1250 SUSPENSION ORAL at 08:48

## 2022-06-02 ASSESSMENT — ACTIVITIES OF DAILY LIVING (ADL)
ADLS_ACUITY_SCORE: 55
ADLS_ACUITY_SCORE: 59
ADLS_ACUITY_SCORE: 55
ADLS_ACUITY_SCORE: 55
ADLS_ACUITY_SCORE: 59
ADLS_ACUITY_SCORE: 55
ADLS_ACUITY_SCORE: 59
ADLS_ACUITY_SCORE: 55
ADLS_ACUITY_SCORE: 55
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
## 2022-06-02 NOTE — PROGRESS NOTES
Pt is a transfer from ICU and is currently on room air with SpO2 of 99%. Breath sounds are coarse, diminished. Pt received neb treatments as ordered.     Will continue to monitor.    Hannah Pastor, RT assistant

## 2022-06-02 NOTE — PLAN OF CARE
DATE & TIME: 6/1/22 [id number]    Cognitive Concerns/ Orientation : HECTOR orientation. Pt nonverbal, makes noises at times. Smiles and gives thumbs up.   BEHAVIOR & AGGRESSION TOOL COLOR: Green   ABNL VS/O2: VSS on RA  MOBILITY: Lift, fall risk. Reposition q2h.  PAIN MANAGMENT: No signs of pain  DIET: Tube feed infusing 60cc/hr. H2O flushes 60cc/4h.  BOWEL/BLADDER: Incontinent of bowel and bladder. Condom catheter in place. Rectal tube in place.  ABNL LAB/BG: /168/116  DRAIN/DEVICES: IV SL/PEG tube infusing  TELEMETRY RHYTHM: NSR  SKIN: Scattered bruising. Blanchable redness to coccyx. Generalized edema +2.  D/C DATE: Discharge 2-3 days pending progress  OTHER IMPORTANT INFO: Mom, Savannah, will be here today. Lung sounds coarse crackles throughout. Weak, nonproductive cough.

## 2022-06-02 NOTE — PROGRESS NOTES
[facility]    Internal Medicine Hospitalist Progress Note  06/02/2022      Assessment & Plan     Keyon Farias is a 61 year old male with history including TBI with aphasia, R sided spastic hemiplegia and dysphagia with GJ-tube for TFs/meds; seizure disorder; panhypopituitarism; h/o multiple hospital admissions for recurrent pneumonia (last in 12/2021); who presented 5/26/2022 with fever and found with evidence of UTI with sepsis.    On 5/27/2022, RRT called and pt found to be in septic shock and transferred to the ICU and was intubated and started on pressors. BC's subsequently positive for Strep salivarius group, UC only with 50-100K mixed urogenital jaime. Able to be weaned off pressors 5/30/2022. Extubated 5/30/2022, transferred out of the ICU 6/1/2022.    Strep salivarius group bacteremia, unclear source.  UTI.  Septic shock, suspect due to above.  Acute hypoxic respiratory failure, possibly related to septic shock, resolved.  Lactic acidosis due to sepsis, resolved.  History of recurrent aspiration pneumonia with acute hypoxic respiratory failure and sepsis.  * H/o multiple hospitalizations for aspiration pneumonia; however, last in 12/2021. Has chronic GJ-tube.  * On admit 5/26: WBC 19.5, lactate 6.9, UA showed 180 WBC, lg LE, positive nitrites. Started on pip-tazo and vanco on admit 5/26. BC 5/26 subsequently positive for Strep salivarius group.   UC 5/26 subsequently grew 50-100K mixed urogenital jaime.   * 5/27: RRT called and transferred to ICU for septic shock and required pressors. CT CAP 5/27 showed possible basilar infiltrates vs. artifact of breathing motion at the lung bases; no definite cause for fever and abdominal pain demonstrated in the abdomen or pelvis; stable cystic lesion in the pancreas. Pip-tazo stopped and started on meropenem 5/27. BC's 5/27 NGTD.  * 5/29: Antibiotics changed to ceftriaxone.  * 5/30: Pressors stopped and extubated.  * 6/1: Off O2.  Recent Labs    Lab 06/02/22  0750 05/29/22  0412 05/28/22  1157 05/28/22  0437 05/27/22  2354 05/27/22  1832 05/27/22  1411 05/27/22  0835 05/27/22  0650 05/26/22  2337 05/26/22  1523 05/26/22  1519   WBC 8.8 10.2  --  20.2*  --   --  30.7*  --  28.5*  --  19.5*  --    LACT  --   --  3.8*  --  6.6* 6.8* 6.9* 0.7  --  1.8  --  1.4   - Continue ceftriaxone (started 5/29) - plan 2 weeks antibiotics for now (stop 6/9).  -Appreciate ID recommendations (echocardiogram, OMFS consultation) regarding strep salivarius group bacteremia,  - Continue PTA acetylcysteine + albuterol nebs and PRN nebs.  - Continue IS, pulmonary toilet.  - Monitor cultures.  -Echo as below, no evidence of vegetations, LVEF 55-60%  -Pending OMFS recommendations following dental/facial bone CT    Volume overload, suspect from fluid resuscitation.  * Given IV furosemide x1 for crackles/chest congestion on 6/1 with good response.  - Monitor clinically for need for further diuretics holding off in the absence of any O2 requirement.  - Monitor i/o's, daily wts.    Anemia, suspect dilutional component.  * Hgb normal on admit. No overt clinical signs of major bleeding.  Recent Labs   Lab 06/02/22  0750 05/29/22  0412 05/28/22  0437 05/27/22  1411 05/27/22  0650 05/26/22  1523   HGB 10.5* 10.6* 11.2* 11.6* 14.0 13.8   - Monitor CBC.  - Consider prbc transfusion if hgb </= 7.0 or if significant bleeding with hemodynamic instability or if symptomatic.    Thrombocytopenia, improving, unclear etiology, possibly secondary to bacteremia and/or medication effect or chronic from other cause.  * Plts normal on admit.  * Plts down to 144K on 5/28.  Recent Labs   Lab 06/02/22  0750 05/29/22  0412 05/28/22  0437 05/27/22  1411 05/27/22  0650 05/26/22  1523    143* 144* 168 161 186   - Monitor CBC.  - Consider platelet transfusion if needs a procedure and less than 50,000; or if less than 10,000.    TBI with aphasia, dysphagia, and R sided spastic hemiplegia.   Dysphagia with  GJ-tube for TFs.  * Patient's mother is his caregiver, along with home care attendants. Has had multiple hospitalizations for recurrent pneumonias, but last in 12/2021.  - Continue TF's.  - holding PTA scheduled metoclopramide in setting of diarrhea.  - Resume PRN scopolamine at discharge.  - Continue PT, OT.    Seizure disorder.  Secondary to TBI. Chronic and stable on PTA AEDs.  - Continue brivaracetam, carbamazepine.    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  * Started on stress dose of IV hydrocortisone in the ICU.  -Status post stress steroids while in septic shock, now quickly tapered down and  back to normal PTA PO dosing (15 mg qam and 5 mg qpm at 2 pm) resumed on 6/2.  - Continue levothyroxine.  - Continue testosterone IM weekly.  -He remains euglycemic on 6/3/2022 discontinue glycemic checks  -Place patient out of bed    GERD.  - Continue pantoprazole.    Diarrhea requiring fecal management system, has been on antibiotics since day of admission, afebrile, normal WBCs, no abdominal pain on exam.  - Holding Reglan as above  - If spikes fever or has abdominal pain would recommend checking C. 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  History of DVT.  - Noted.  -Currently hemodynamically stable so we will discontinue telemetry monitoring    Clinically Significant Risk Factors Present on Admission                      COVID-19 testing.  COVID-19 PCR Results    COVID-19 PCR Results 8/9/21 8/24/21 9/30/21 10/28/21 11/12/21 11/24/21 12/5/21 12/27/21 5/9/22 5/26/22   COVID-19 Virus PCR to [facility] - Result             COVID-19 Virus PCR to [facility] - Source             SARS-CoV-2 Virus Specimen Source             Flu A/B & SARS-COV-2 PCR Source             SARS-CoV-2 PCR Result             SARS CoV2 PCR Negative Negative Negative Negative Negative Negative Negative Negative Negative Negative      Comments are available for some flowsheets but are not being displayed.         COVID-19 Antibody Results, Testing  for Immunity    COVID-19 Antibody Results, Testing for Immunity   No data to display.             Diet: NPO for Medical/Clinical Reasons Except for: No Exceptions  Adult Formula Drip Feeding: Continuous Jevity 1.5; Jejunostomy; Goal Rate: 60; mL/hr; Medication - Feeding Tube Flush Frequency: At least 15-30 mL water before and after medication administration and with tube clogging; Amount to Send (Nutrition u...    Prophylaxis: PCD's, ambulation.   Lerma Catheter: Not present  Central Lines: None  Code Status: Full Code  I have attempted to call patient's mother this morning for a clinical update, no answer and no identifier on voicemail so none left.  I will attempt again prior to the end of my shift      Disposition Plan   Expected discharge: 1-2d recommended to prior living arrangement pending improvement in above issues.  Entered: NATHANIEL Cooper CNP 06/02/2022, 11:47 AM     I have discussed plan of care w/ bedside RN and attending physician Dr. Meier    Total time 25 minutes of which 8 minutes was spent face-to-face from 1107 to 1115am examining patient.      Joy Miramontes CNP  Hospitalist - [facility] 7am-6pm  Text Page   Pager [phone]    Interval History   No acute events patient able to give thumbs up.  He has been afebrile, normotensive, on room air.  History obtained from RN.  Having liquid stools.  No other complaint    -Data reviewed today: I reviewed all new labs and imaging over the last 24 hours. I personally reviewed no images or EKG's today.    Physical Exam    , Blood pressure 126/59, pulse 67, temperature 98.3  F (36.8  C), temperature source Axillary, resp. rate 20, weight 83.1 kg (183 lb 3.2 oz), SpO2 99 %. O2 Device: None (Room air)    Vitals:    05/31/22 2100 06/01/22 0500 06/02/22 0511   Weight: 86 kg (189 lb 9.5 oz) 83.1 kg (183 lb 3.2 oz) 83.1 kg (183 lb 3.2 oz)     Vital Signs with Ranges  Temp:  [97.8  F (36.6  C)-98.4  F (36.9  C)] 98.3  F (36.8  C)  Pulse:  [60-78] 67  Resp:   [14-24] 20  BP: (118-154)/(53-67) 126/59  SpO2:  [94 %-100 %] 99 %  Patient Vitals for the past 24 hrs:   BP Temp Temp src Pulse Resp SpO2 Weight   06/02/22 1113 -- -- -- -- -- 99 % --   06/02/22 0740 126/59 98.3  F (36.8  C) Axillary 67 20 95 % --   06/02/22 0707 -- -- -- -- -- 94 % --   06/02/22 0511 -- -- -- -- -- -- 83.1 kg (183 lb 3.2 oz)   06/02/22 0020 134/67 98.4  F (36.9  C) Axillary 63 18 98 % --   06/01/22 2035 -- -- -- -- -- 100 % --   06/01/22 1912 134/59 98.3  F (36.8  C) Axillary 60 16 99 % --   06/01/22 1800 135/60 -- -- 69 14 97 % --   06/01/22 1700 124/53 -- -- 78 21 99 % --   06/01/22 1600 128/53 -- -- 70 24 100 % --   06/01/22 1522 -- -- -- -- -- 100 % --   06/01/22 1500 (!) 154/62 -- -- 66 -- 100 % --   06/01/22 1400 120/53 97.8  F (36.6  C) Axillary 65 -- 99 % --   06/01/22 1300 (!) 140/60 -- -- 72 -- 99 % --   06/01/22 1200 118/53 -- -- 63 -- 97 % --     I/O's Last 24 hours  I/O last 3 completed shifts:  In: 1410 [I.V.:20; NG/GT:670]  Out: 4250 [Urine:4000; Stool:250]    Constitutional: vs as above  General:  adult pt lying in bed without acute distress  Neuro: +follows commands stick out tongue and give thumbs up on the left, right sided extremities are plegic face symmetric, tongue midline,   Eyes pupils equal round 3mm briskly reactive bilat, sclera nonicteric, noninjected, conjunctiva pink,  Head, ENT & mouth: NC/AT, dry oral mucosa  Neck: supple, old trach stoma well healed   CV S1S2 no murmur  resp: CTAB upper lobes  gi:normoactive bowel sounds, soft, nontender, nondisteded, PEG left upper quadrant  Ext: Anasarca except on right hand  Skin: no rashes on exposed skin  Lymph: Deferred  Musculoskeletal no bony joint deformities      Data   Recent Labs   Lab 06/02/22  0830 06/02/22  0750 06/02/22  0351 06/01/22  0812 06/01/22  0335 05/31/22  2036 05/31/22  1758 05/31/22  0655 05/31/22  0540 05/30/22  0614 05/30/22  0425 05/29/22  1148 05/29/22  1145 05/29/22  0500 05/29/22  0412  05/28/22  0807 05/28/22  0437 05/27/22  1426 05/27/22  1411 05/27/22  0835   WBC  --  8.8  --   --   --   --   --   --   --   --   --   --   --   --  10.2  --  20.2*  --  30.7*  --    HGB  --  10.5*  --   --   --   --   --   --   --   --   --   --   --   --  10.6*  --  11.2*  --  11.6*  --    MCV  --  93  --   --   --   --   --   --   --   --   --   --   --   --  93  --  94  --  93  --    PLT  --  165  --   --   --   --   --   --   --   --   --   --   --   --  143*  --  144*  --  168  --    NA  --  142  --   --  142  --  139   < > 140   < > 149*   < > 157*  --  159*   < > 144  --  136  --    POTASSIUM  --  4.1  --   --  3.8  --   --   --  4.1   < > 3.5   < > 3.5  --  4.5   < > 2.1*  --  4.5  --    CHLORIDE  --  111*  --   --   --   --   --   --   --   --  118*  --  126*  --  130*   < > 110*  --  106  --    CO2  --  28  --   --   --   --   --   --   --   --  28  --  27  --  28   < > 24  --  19*  --    BUN  --  21  --   --   --   --   --   --   --   --  9  --  10  --  11   < > 17  --  19  --    CR  --  0.64*  --   --  0.71  --   --   --  0.71  --  0.74  --  0.81  --  0.83   < > 1.08  --  1.32*  --    ANIONGAP  --  3  --   --   --   --   --   --   --   --  3  --  4  --  1*   < > 10  --  11  --    STEVE  --  7.3*  --   --   --   --   --   --   --   --  7.0*  --  7.6*  --  8.1*   < > 7.6*  --  7.4*  --    GLC 82 76 116*   < >  --    < >  --    < > 121*   < > 121*   < > 172*   < > 183*   < > 356*   < > 156*  --    ALBUMIN  --   --   --   --   --   --   --   --   --   --   --   --   --   --   --   --   --   --  2.1* 2.5*   PROTTOTAL  --   --   --   --   --   --   --   --   --   --   --   --   --   --   --   --   --   --  5.5* 6.4*   BILITOTAL  --   --   --   --   --   --   --   --   --   --   --   --   --   --   --   --   --   --  1.2 1.7*   ALKPHOS  --   --   --   --   --   --   --   --   --   --   --   --   --   --   --   --   --   --  84 106   ALT  --   --   --   --   --   --   --   --   --   --   --   --   --   --   --    --   --   --   26   AST  --   --   --   --   --   --   --   --   --   --   --   --   --   --   --   --   --   --  42 50*    < > = values in this interval not displayed.     Recent Labs   Lab Test 22  0830 22  0750 22  0351 22  0010 22  1106 21  1255 21  0746 21  0429 21  2358 21  2041   GLC 82 76 116* 168* 105*   < >  --   --   --   --   --    BGM  --   --   --   --   --   --  96 92 136* 163* 155*    < > = values in this interval not displayed.     Recent Labs   Lab 22  0750 22  0412 22  1157 22  0437 22  2354 22  1832 22  1411 22  0835 22  0650 22  2337 22  1523   WBC 8.8 10.2  --  20.2*  --   --  30.7*  --  28.5*  --  19.5*   LACT  --   --  3.8*  --  6.6* 6.8* 6.9* 0.7  --  1.8  --          Recent Results (from the past 24 hour(s))   Echocardiogram Complete   Result Value    LVEF  55-60%    [facility]    [id number]  [id number]  [id number]  [id number]^CLAUDIA^KYAW^VALERI     [facility]  Echocardiography Laboratory  [address] [place] [id number]     Name: BERT BARAJAS  MRN: [id number]  : 1960  Study Date: 2022 09:26 AM  Age: 61 yrs  Gender: Male  Patient Location: [facility]  Reason For Study: Endocarditis  Ordering Physician: KYAW TAYLOR  Referring Physician: KYAW TAYLOR  Performed By: Julius Silva     BSA: 1.7 m2  Height: 62 in  Weight: 156 lb  HR: 70  ______________________________________________________________________________  Procedure  Complete Echo Adult.  ______________________________________________________________________________  Interpretation Summary     Technically difficult imaging.  No valvular vegetations identifed.  The left ventricle is normal in structure, function and size.  The visual ejection fraction is 55-60%.  The right ventricle is normal in structure, function and size.  Doppler interrogation does  not demonstrate signficant stenosis or  insufficiency involving cardiac valves     No signficant change since 10/26/2020  ______________________________________________________________________________  Left Ventricle  The left ventricle is normal in structure, function and size. There is normal  left ventricular wall thickness. The visual ejection fraction is 55-60%. Left  ventricular diastolic function is normal. Normal left ventricular wall motion.  There is no thrombus seen in the left ventricle.     Right Ventricle  The right ventricle is normal in structure, function and size. There is no  mass or thrombus in the right ventricle.     Atria  Normal left atrial size. Right atrial size is normal. There is no atrial shunt  seen. The left atrial appendage is not well visualized.     Mitral Valve  The mitral valve leaflets appear normal. There is no evidence of stenosis,  fluttering, or prolapse. There is no mitral regurgitation noted. There is no  mitral valve stenosis.     Tricuspid Valve  Normal tricuspid valve. No tricuspid regurgitation. Right ventricular systolic  pressure could not be approximated due to inadequate tricuspid regurgitation.  There is no tricuspid stenosis.     Aortic Valve  The aortic valve is trileaflet. No aortic regurgitation is present. No aortic  stenosis is present.     Pulmonic Valve  Normal pulmonic valve. There is trace pulmonic valvular regurgitation. There  is no pulmonic valvular stenosis.     Vessels  The aortic root is normal size. Normal size ascending aorta. The inferior vena  cava is normal. The pulmonary artery is normal size.     Pericardium  Trivial pericardial effusion. There is no pleural effusion.     Rhythm  Sinus rhythm was noted.  ______________________________________________________________________________  MMode/2D Measurements & Calculations     IVSd: 1.2 cm  LVIDd: 4.8 cm  LVIDs: 3.0 cm  LVPWd: 0.73 cm  FS: 37.3 %  LV mass(C)d: 165.6 grams  LV mass(C)dI: 96.3  grams/m2  Ao root diam: 3.7 cm  asc Aorta Diam: 3.7 cm  LVOT diam: 2.2 cm  LVOT area: 3.8 cm2  RWT: 0.31     Doppler Measurements & Calculations  MV E max aren: 87.9 cm/sec  MV A max aren: 50.8 cm/sec  MV E/A: 1.7  MV dec time: 0.16 sec  Ao V2 max: 107.0 cm/sec  Ao max P.0 mmHg  GHULAM(V,D): 3.6 cm2  LV V1 max P.1 mmHg  LV V1 max: 101.0 cm/sec  LV V1 VTI: 23.3 cm  SV(LVOT): 88.6 ml  SI(LVOT): 51.5 ml/m2  PA V2 max: 92.3 cm/sec  PA max PG: 3.4 mmHg  PA acc time: 0.11 sec     AV Aren Ratio (DI): 0.94  Lateral E/e': 7.7     ______________________________________________________________________________  Report approved by: Dr. Mark Blackwood 2022 11:37 AM             Medications   All medications were reviewed.    dextrose       dextrose         acetylcysteine  2 mL Nebulization 4x Daily     albuterol  2.5 mg Nebulization Q4H While awake     aspirin  81 mg Oral or Feeding Tube Daily     banatrol plus  1 packet Per Feeding Tube BID 09 12     Brivaracetam  100 mg Oral or Feeding Tube BID     calcium carbonate  1,250 mg Oral or Feeding Tube TID     carBAMazepine  100 mg Oral or Feeding Tube Daily     carBAMazepine  150 mg Oral or Feeding Tube TID     cefTRIAXone  2 g Intravenous Q24H     chlorhexidine  15 mL Swish & Spit BID     hydrocortisone  15 mg Oral or Feeding Tube QAM     hydrocortisone  5 mg Oral Daily     insulin aspart  1-12 Units Subcutaneous Q4H     levothyroxine  150 mcg Oral or Feeding Tube Daily     [Held by provider] metoclopramide  10 mg Oral or Feeding Tube 4x Daily AC & HS     multivitamins w/minerals  15 mL Oral or Feeding Tube Daily     pantoprazole  40 mg Per J Tube Daily     potassium & sodium phosphates  1 packet Oral or Feeding Tube TID     sodium chloride (PF)  10-40 mL Intracatheter Q7 Days     sodium chloride (PF)  3 mL Intracatheter Q8H     vitamin C  1,000 mg Oral or Feeding Tube Daily     vitamin D3  50 mcg Oral or Feeding Tube Daily     acetaminophen **OR** acetaminophen,  bisacodyl, dextrose, dextrose, glucose **OR** dextrose **OR** glucagon, lidocaine 4%, melatonin, naloxone **OR** naloxone **OR** naloxone **OR** naloxone, nitroGLYcerin, ondansetron **OR** ondansetron, senna-docusate **OR** senna-docusate, sodium chloride (PF), sodium chloride (PF), sodium chloride (PF), sodium chloride (PF)

## 2022-06-02 NOTE — PROGRESS NOTES
[facility]    Infectious Disease Progress Note    Date of Service (when I saw the patient): 06/02/2022     Assessment & Plan   Keyon Farias is a 61 year old male who was admitted on 5/26/2022.     Impression:  1. 61-year-old male, very well known to our group, admitted for a readmission after numerous recent admissions for acute fever, aspiration pneumonia.   2.  Prior history of numerous aspiration pneumonia, sepsis and fever events, has had chronic Pseudomonas colonization which, of note, has at times historically been piperacillin resistant, also has other known resistant pathogens, both MRSA and VRE.   3.  Traumatic brain injury, chronic aphasia and spastic paralysis.   4.  History of seizure disorder.   5.  MRSA and VRE colonization.   6. Admitted with fever.   7. Admission cultures 2/2 positive for strep salivarius , very poor dentition         RECOMMENDATIONS:   1. ceftriaxone   2. Follow up on the pending repeat cultures.   3. No clear vegetations on the  echocardiogram   4. OMFS consult          Senait Orona MD    Interval History   Tolerating antibiotics ok   No new rashes or issues with antibiotics   Labs reviewed   Afebrile   Out of the icu    Physical Exam   Temp: 98.3  F (36.8  C) Temp src: Axillary BP: 126/59 Pulse: 67   Resp: 20 SpO2: 99 % O2 Device: None (Room air)    Vitals:    05/31/22 2100 06/01/22 0500 06/02/22 0511   Weight: 86 kg (189 lb 9.5 oz) 83.1 kg (183 lb 3.2 oz) 83.1 kg (183 lb 3.2 oz)     Vital Signs with Ranges  Temp:  [97.8  F (36.6  C)-98.4  F (36.9  C)] 98.3  F (36.8  C)  Pulse:  [60-78] 67  Resp:  [14-24] 20  BP: (118-154)/(53-67) 126/59  SpO2:  [94 %-100 %] 99 %    Constitutional: chronically ill appearing, poor dentition   Lungs: Clear to auscultation bilaterally, no crackles or wheezing  Cardiovascular: Regular rate and rhythm, normal S1 and S2, and no murmur noted  Abdomen: Normal bowel sounds, soft, non-distended, non-tender  Skin: No rashes, no  cyanosis, no edema  Other:    Medications     dextrose       dextrose         acetylcysteine  2 mL Nebulization 4x Daily     albuterol  2.5 mg Nebulization Q4H While awake     aspirin  81 mg Oral or Feeding Tube Daily     banatrol plus  1 packet Per Feeding Tube BID 09 12     Brivaracetam  100 mg Oral or Feeding Tube BID     calcium carbonate  1,250 mg Oral or Feeding Tube TID     carBAMazepine  100 mg Oral or Feeding Tube Daily     carBAMazepine  150 mg Oral or Feeding Tube TID     cefTRIAXone  2 g Intravenous Q24H     chlorhexidine  15 mL Swish & Spit BID     hydrocortisone  15 mg Oral or Feeding Tube QAM     hydrocortisone  5 mg Oral Daily     insulin aspart  1-12 Units Subcutaneous Q4H     levothyroxine  150 mcg Oral or Feeding Tube Daily     [Held by provider] metoclopramide  10 mg Oral or Feeding Tube 4x Daily AC & HS     multivitamins w/minerals  15 mL Oral or Feeding Tube Daily     pantoprazole  40 mg Per J Tube Daily     potassium & sodium phosphates  1 packet Oral or Feeding Tube TID     sodium chloride (PF)  10-40 mL Intracatheter Q7 Days     sodium chloride (PF)  3 mL Intracatheter Q8H     vitamin C  1,000 mg Oral or Feeding Tube Daily     vitamin D3  50 mcg Oral or Feeding Tube Daily       Data   All microbiology laboratory data reviewed.  Recent Labs   Lab Test 06/02/22  0750 05/29/22  0412 05/28/22  0437   WBC 8.8 10.2 20.2*   HGB 10.5* 10.6* 11.2*   HCT 32.9* 33.3* 35.4*   MCV 93 93 94    143* 144*     Recent Labs   Lab Test 06/02/22  0750 06/01/22  0335 05/31/22  0540   CR 0.64* 0.71 0.71     No lab results found.  Recent Labs   Lab Test 05/23/21  0316 05/23/21  0250 04/15/21  2250 02/22/21  1622 02/22/21  1439 02/22/21  1413 02/19/21  1155 02/19/21  1123 01/15/21  0214   CULT No growth No growth No growth No growth No growth No growth No growth No growth >100,000 colonies/mL  Klebsiella pneumoniae  *

## 2022-06-03 ENCOUNTER — HOME INFUSION (PRE-WILLOW HOME INFUSION) (OUTPATIENT)
Dept: PHARMACY | Facility: CLINIC | Age: 62
End: 2022-06-03
Payer: MEDICARE

## 2022-06-03 LAB
CRP SERPL-MCNC: 23 MG/L (ref 0–8)
GLUCOSE BLDC GLUCOMTR-MCNC: 107 MG/DL (ref 70–99)
GLUCOSE BLDC GLUCOMTR-MCNC: 126 MG/DL (ref 70–99)
GLUCOSE BLDC GLUCOMTR-MCNC: 128 MG/DL (ref 70–99)
GLUCOSE BLDC GLUCOMTR-MCNC: 130 MG/DL (ref 70–99)
GLUCOSE BLDC GLUCOMTR-MCNC: 133 MG/DL (ref 70–99)
GLUCOSE BLDC GLUCOMTR-MCNC: 99 MG/DL (ref 70–99)
MAGNESIUM SERPL-MCNC: 2.4 MG/DL (ref 1.6–2.3)
PHOSPHATE SERPL-MCNC: 2.7 MG/DL (ref 2.5–4.5)
POTASSIUM BLD-SCNC: 3.8 MMOL/L (ref 3.4–5.3)

## 2022-06-03 PROCEDURE — 999N000157 HC STATISTIC RCP TIME EA 10 MIN

## 2022-06-03 PROCEDURE — 250N000013 HC RX MED GY IP 250 OP 250 PS 637: Performed by: HOSPITALIST

## 2022-06-03 PROCEDURE — 250N000009 HC RX 250: Performed by: INTERNAL MEDICINE

## 2022-06-03 PROCEDURE — 83735 ASSAY OF MAGNESIUM: CPT | Performed by: NURSE PRACTITIONER

## 2022-06-03 PROCEDURE — 94640 AIRWAY INHALATION TREATMENT: CPT

## 2022-06-03 PROCEDURE — 99233 SBSQ HOSP IP/OBS HIGH 50: CPT | Performed by: HOSPITALIST

## 2022-06-03 PROCEDURE — 94640 AIRWAY INHALATION TREATMENT: CPT | Mod: 76

## 2022-06-03 PROCEDURE — 250N000013 HC RX MED GY IP 250 OP 250 PS 637: Performed by: INTERNAL MEDICINE

## 2022-06-03 PROCEDURE — 36415 COLL VENOUS BLD VENIPUNCTURE: CPT | Performed by: NURSE PRACTITIONER

## 2022-06-03 PROCEDURE — 84100 ASSAY OF PHOSPHORUS: CPT | Performed by: NURSE PRACTITIONER

## 2022-06-03 PROCEDURE — 250N000011 HC RX IP 250 OP 636: Performed by: INTERNAL MEDICINE

## 2022-06-03 PROCEDURE — 84132 ASSAY OF SERUM POTASSIUM: CPT | Performed by: NURSE PRACTITIONER

## 2022-06-03 PROCEDURE — 86140 C-REACTIVE PROTEIN: CPT | Performed by: HOSPITALIST

## 2022-06-03 PROCEDURE — 120N000001 HC R&B MED SURG/OB

## 2022-06-03 RX ORDER — CEFTRIAXONE 2 G/1
2 INJECTION, POWDER, FOR SOLUTION INTRAMUSCULAR; INTRAVENOUS EVERY 24 HOURS
Status: DISCONTINUED | OUTPATIENT
Start: 2022-06-03 | End: 2022-06-08 | Stop reason: HOSPADM

## 2022-06-03 RX ORDER — POTASSIUM CHLORIDE 20MEQ/15ML
20 LIQUID (ML) ORAL ONCE
Status: COMPLETED | OUTPATIENT
Start: 2022-06-03 | End: 2022-06-03

## 2022-06-03 RX ORDER — CEFTRIAXONE 1 G/1
2000 INJECTION, POWDER, FOR SOLUTION INTRAMUSCULAR; INTRAVENOUS DAILY
Qty: 600 ML | Refills: 0 | Status: ON HOLD | DISCHARGE
Start: 2022-06-03 | End: 2022-06-30

## 2022-06-03 RX ADMIN — Medication 40 MG: at 08:55

## 2022-06-03 RX ADMIN — ALBUTEROL SULFATE 2.5 MG: 2.5 SOLUTION RESPIRATORY (INHALATION) at 07:00

## 2022-06-03 RX ADMIN — ASPIRIN 81 MG CHEWABLE TABLET 81 MG: 81 TABLET CHEWABLE at 08:51

## 2022-06-03 RX ADMIN — CALCIUM CARBONATE 1250 MG: 1250 SUSPENSION ORAL at 15:06

## 2022-06-03 RX ADMIN — CHLORHEXIDINE GLUCONATE 15 ML: 1.2 SOLUTION ORAL at 19:13

## 2022-06-03 RX ADMIN — HYDROCORTISONE 5 MG: 5 TABLET ORAL at 14:58

## 2022-06-03 RX ADMIN — Medication 15 ML: at 08:56

## 2022-06-03 RX ADMIN — CEFTRIAXONE SODIUM 2 G: 2 INJECTION, POWDER, FOR SOLUTION INTRAMUSCULAR; INTRAVENOUS at 14:57

## 2022-06-03 RX ADMIN — OXYCODONE HYDROCHLORIDE AND ACETAMINOPHEN 1000 MG: 500 TABLET ORAL at 08:51

## 2022-06-03 RX ADMIN — LEVOTHYROXINE SODIUM 150 MCG: 100 TABLET ORAL at 07:30

## 2022-06-03 RX ADMIN — POTASSIUM CHLORIDE 20 MEQ: 20 SOLUTION ORAL at 14:58

## 2022-06-03 RX ADMIN — ALBUTEROL SULFATE 2.5 MG: 2.5 SOLUTION RESPIRATORY (INHALATION) at 11:13

## 2022-06-03 RX ADMIN — Medication 1 PACKET: at 17:32

## 2022-06-03 RX ADMIN — CARBAMAZEPINE 150 MG: 100 SUSPENSION ORAL at 12:21

## 2022-06-03 RX ADMIN — ALBUTEROL SULFATE 2.5 MG: 2.5 SOLUTION RESPIRATORY (INHALATION) at 23:04

## 2022-06-03 RX ADMIN — BRIVARACETAM 100 MG: 10 SOLUTION ORAL at 09:01

## 2022-06-03 RX ADMIN — HYDROCORTISONE 15 MG: 10 TABLET ORAL at 08:51

## 2022-06-03 RX ADMIN — ACETYLCYSTEINE 2 ML: 200 SOLUTION ORAL; RESPIRATORY (INHALATION) at 11:13

## 2022-06-03 RX ADMIN — BRIVARACETAM 100 MG: 10 SOLUTION ORAL at 20:07

## 2022-06-03 RX ADMIN — ACETYLCYSTEINE 2 ML: 200 SOLUTION ORAL; RESPIRATORY (INHALATION) at 15:20

## 2022-06-03 RX ADMIN — POTASSIUM & SODIUM PHOSPHATES POWDER PACK 280-160-250 MG 1 PACKET: 280-160-250 PACK at 08:55

## 2022-06-03 RX ADMIN — MICONAZOLE NITRATE: 20 POWDER TOPICAL at 22:09

## 2022-06-03 RX ADMIN — ACETYLCYSTEINE 2 ML: 200 SOLUTION ORAL; RESPIRATORY (INHALATION) at 19:37

## 2022-06-03 RX ADMIN — CARBAMAZEPINE 150 MG: 100 SUSPENSION ORAL at 07:23

## 2022-06-03 RX ADMIN — ALBUTEROL SULFATE 2.5 MG: 2.5 SOLUTION RESPIRATORY (INHALATION) at 15:20

## 2022-06-03 RX ADMIN — POTASSIUM & SODIUM PHOSPHATES POWDER PACK 280-160-250 MG 1 PACKET: 280-160-250 PACK at 14:58

## 2022-06-03 RX ADMIN — POTASSIUM & SODIUM PHOSPHATES POWDER PACK 280-160-250 MG 1 PACKET: 280-160-250 PACK at 19:13

## 2022-06-03 RX ADMIN — CARBAMAZEPINE 100 MG: 100 SUSPENSION ORAL at 17:31

## 2022-06-03 RX ADMIN — Medication 1 PACKET: at 09:01

## 2022-06-03 RX ADMIN — CARBAMAZEPINE 150 MG: 100 SUSPENSION ORAL at 00:28

## 2022-06-03 RX ADMIN — Medication 50 MCG: at 08:51

## 2022-06-03 RX ADMIN — CALCIUM CARBONATE 1250 MG: 1250 SUSPENSION ORAL at 08:56

## 2022-06-03 RX ADMIN — ACETYLCYSTEINE 2 ML: 200 SOLUTION ORAL; RESPIRATORY (INHALATION) at 07:00

## 2022-06-03 RX ADMIN — CALCIUM CARBONATE 1250 MG: 1250 SUSPENSION ORAL at 19:12

## 2022-06-03 RX ADMIN — ALBUTEROL SULFATE 2.5 MG: 2.5 SOLUTION RESPIRATORY (INHALATION) at 19:37

## 2022-06-03 ASSESSMENT — ACTIVITIES OF DAILY LIVING (ADL)
ADLS_ACUITY_SCORE: 59
DEPENDENT_IADLS:: CLEANING;COOKING;LAUNDRY;SHOPPING;MEAL PREPARATION;MEDICATION MANAGEMENT;MONEY MANAGEMENT;TRANSPORTATION;INCONTINENCE
ADLS_ACUITY_SCORE: 59

## 2022-06-03 NOTE — PLAN OF CARE
DATE & TIME: 6/2/2022 0700-1930  Cognitive Concerns/ Orientation : Alert. HECTOR orientation. Pt nonverbal, makes noises at times. Smiles and gives thumbs up.   BEHAVIOR & AGGRESSION TOOL COLOR: Green   ABNL VS/O2: VSS on RA  MOBILITY: Total cares, up with lift, assist of 2 to turn/repo.   PAIN MANAGMENT: No signs of pain  DIET: Tube feed infusing 60cc/hr. H2O flushes 60cc/q4h.  BOWEL/BLADDER: Incontinent of bowel and bladder. External catheter in place, good urine output. Rectal tube in place, leaked large amount x1, adjusted, now working well.  ABNL LAB/BG: K, Mag, Phos protocols, all WNL, rechecks ordered for am. WBC 8.8, Hgb 10.5. BG 85, 121, 117.   DRAIN/DEVICES: PIV x2, saline locked. PEG tube. External cath. Rectal tube.   TELEMETRY RHYTHM: NSR, now discontinued.   TESTS/PROCEDURES: Facial CT with dental reconstruction done today, see results. Echo completed today, see results.   SKIN: Scattered bruising. Blanchable redness to coccyx. Generalized edema +2.  D/C DATE: Discharge 2-3 days pending progress  OTHER IMPORTANT INFO: Plan of care reviewed with MomSavannah at bedside. Lung sounds coarse crackles throughout. Weak, nonproductive cough. ID following, Oral surgery consulted.     Goal Outcome Evaluation: plan of care reviewed with patient and mother, progressing.

## 2022-06-03 NOTE — PLAN OF CARE
DATE & TIME: 06/03/22 7-3 pm   Cognitive Concerns/ Orientation : Alert. HECTOR orientation. Pt nonverbal, makes noises at times. Smiles and gives thumbs up.   BEHAVIOR & AGGRESSION TOOL COLOR: Green   ABNL VS/O2: VSS on RA  MOBILITY: Total cares, up with lift, assist of 2 to turn/repo.   PAIN MANAGMENT: No signs of pain  DIET: NPO except Tube feed infusing continuously @ 60cc/hr. Free water flushes 60cc/q4h.   BOWEL/BLADDER: Incontinent of bowel and bladder. External catheter in place, leaked and changed x1.  Rectal tube in place and not leaking.   ABNL LAB/BG: K+ 3.8, on high protocol replaced x1. Recheck tomorrow am. CRP 23.0. /128.   DRAIN/DEVICES: PIV SL. PEG tube. External cath. Rectal tube. PCDs on.   TELEMETRY RHYTHM: NA  TESTS/PROCEDURES: None scheduled.   SKIN: Pale. Dry/flaky. Scattered bruising. Blanchable redness to coccyx, mepilex to coccyx. Generalized edema +2.  D/C DATE: Unknown  OTHER IMPORTANT INFO: Contact precautions maintained. Lung sounds coarse crackles throughout. Good, productive cough, but pt won't let you suction and bits down on yonker. Pt fights and will not allow writer to do oral cares. Have attempted many times. ID following, will need midline at discharge. Pt refused asymptomatic COVID swab yesterday. No changes. Stable.

## 2022-06-03 NOTE — PROGRESS NOTES
[facility]    Infectious Disease Progress Note    Date of Service (when I saw the patient): 06/03/2022     Assessment & Plan   Keyon Farias is a 61 year old male who was admitted on 5/26/2022.     Impression:  1. 61-year-old male, very well known to our group, admitted for a readmission after numerous recent admissions for acute fever, aspiration pneumonia.   2.  Prior history of numerous aspiration pneumonia, sepsis and fever events, has had chronic Pseudomonas colonization which, of note, has at times historically been piperacillin resistant, also has other known resistant pathogens, both MRSA and VRE.   3.  Traumatic brain injury, chronic aphasia and spastic paralysis.   4.  History of seizure disorder.   5.  MRSA and VRE colonization.   6. Admitted with fever.   7. Admission cultures 2/2 positive for strep salivarius , very poor dentition         RECOMMENDATIONS:   1. ceftriaxone 4 weeks, OPIV orders are in day 8/28 today   2. No clear vegetations on the  echocardiogram   3. Ok to place midline   4. appreciate OMFS consult          Senait Orona MD    Interval History   Tolerating antibiotics ok   No new rashes or issues with antibiotics   Labs reviewed   Afebrile   Out of the icu    Physical Exam   Temp: 97.9  F (36.6  C) Temp src: Axillary BP: 135/59 Pulse: 67   Resp: 16 SpO2: 97 % O2 Device: None (Room air)    Vitals:    05/31/22 2100 06/01/22 0500 06/02/22 0511   Weight: 86 kg (189 lb 9.5 oz) 83.1 kg (183 lb 3.2 oz) 83.1 kg (183 lb 3.2 oz)     Vital Signs with Ranges  Temp:  [97.2  F (36.2  C)-98  F (36.7  C)] 97.9  F (36.6  C)  Pulse:  [64-77] 67  Resp:  [16-18] 16  BP: (130-137)/(56-64) 135/59  SpO2:  [94 %-99 %] 97 %    Constitutional: chronically ill appearing, poor dentition   Lungs: Clear to auscultation bilaterally, no crackles or wheezing  Cardiovascular: Regular rate and rhythm, normal S1 and S2, and no murmur noted  Abdomen: Normal bowel sounds, soft, non-distended,  non-tender  Skin: No rashes, no cyanosis, no edema  Other:    Medications     dextrose       dextrose         acetylcysteine  2 mL Nebulization 4x Daily     albuterol  2.5 mg Nebulization Q4H While awake     aspirin  81 mg Oral or Feeding Tube Daily     banatrol plus  1 packet Per Feeding Tube BID 09 12     Brivaracetam  100 mg Oral or Feeding Tube BID     calcium carbonate  1,250 mg Oral or Feeding Tube TID     carBAMazepine  100 mg Oral or Feeding Tube Daily     carBAMazepine  150 mg Oral or Feeding Tube TID     cefTRIAXone  2 g Intravenous Q24H     chlorhexidine  15 mL Swish & Spit BID     hydrocortisone  15 mg Oral or Feeding Tube QAM     hydrocortisone  5 mg Per Feeding Tube Daily     insulin aspart  1-12 Units Subcutaneous Q4H     levothyroxine  150 mcg Oral or Feeding Tube Daily     [Held by provider] metoclopramide  10 mg Oral or Feeding Tube 4x Daily AC & HS     multivitamins w/minerals  15 mL Oral or Feeding Tube Daily     pantoprazole  40 mg Per J Tube Daily     potassium & sodium phosphates  1 packet Oral or Feeding Tube TID     sodium chloride (PF)  10-40 mL Intracatheter Q7 Days     sodium chloride (PF)  3 mL Intracatheter Q8H     vitamin C  1,000 mg Oral or Feeding Tube Daily     vitamin D3  50 mcg Oral or Feeding Tube Daily       Data   All microbiology laboratory data reviewed.  Recent Labs   Lab Test 06/02/22  0750 05/29/22  0412 05/28/22  0437   WBC 8.8 10.2 20.2*   HGB 10.5* 10.6* 11.2*   HCT 32.9* 33.3* 35.4*   MCV 93 93 94    143* 144*     Recent Labs   Lab Test 06/02/22  0750 06/01/22  0335 05/31/22  0540   CR 0.64* 0.71 0.71     No lab results found.  Recent Labs   Lab Test 05/23/21  0316 05/23/21  0250 04/15/21  2250 02/22/21  1622 02/22/21  1439 02/22/21  1413 02/19/21  1155 02/19/21  1123 01/15/21  0214   CULT No growth No growth No growth No growth No growth No growth No growth No growth >100,000 colonies/mL  Klebsiella pneumoniae  *

## 2022-06-03 NOTE — PROGRESS NOTES
Therapy: IV ABX  Insurance: Medicare/[place] Medicaid    Patient has coverage for IV ABX through their [place] Medicaid secondary plan at 100% however may have copay upon drug dispense. Also has a monthly deductible of $3.55.    [facility] in reference to admission date 05/26/2022 to check IV ABX coverage.    Please contact Intake with any questions, [phone] or In Basket pool, [facility] ([phone]).

## 2022-06-03 NOTE — PROGRESS NOTES
Patient is on RA with SpO2 in the upper 90's. BS coarse and diminished with an increase in aeration post neb treatments. All nebs were given as ordered.  Will cont to follow.  6/3/2022  Heather Baugh, RT

## 2022-06-03 NOTE — CONSULTS
Care Management Initial Consult    General Information  Assessment completed with: Parents, Mother Savannah  Type of CM/SW Visit: Initial Assessment    Primary Care Provider verified and updated as needed:  (No PCP, his PCP moved to [place])   Readmission within the last 30 days: current reason for admission unrelated to previous admission   Return Category: New Diagnosis  Reason for Consult: care coordination/care conference, community resources, discharge planning, utilization management concerns  Advance Care Planning: Advance Care Planning Reviewed: no concerns identified     General Information Comments: High readmission risk    Communication Assessment  Patient's communication style: spoken language (English or Bilingual)             Cognitive  Cognitive/Neuro/Behavioral: .WDL except  Level of Consciousness: alert  Arousal Level: opens eyes spontaneously  Orientation: other (see comments) (HECTOR, non verbal)  Mood/Behavior: calm, cooperative  Best Language: 2 - Severe aphasia  Speech: incomprehensible sounds    Living Environment:   People in home: parent(s)     Current living Arrangements: house      Able to return to prior arrangements: yes  Living Arrangement Comments: Pt's mother/Guardian will not accept placement in TCU    Family/Social Support:  Care provided by: parent(s), other (see comments) (12 hr PCA for night)  Provides care for: no one, unable/limited ability to care for self  Marital Status: Single             Description of Support System:           Current Resources:   Patient receiving home care services: No     Community Resources: PCA  Equipment currently used at home: wheelchair, manual, wheelchair, power  Supplies currently used at home: Enteral Nutrition & Supplies, Incontinence Supplies    Employment/Financial:  Employment Status: disabled        Financial Concerns:             Lifestyle & Psychosocial Needs:  Social Determinants of Health     Tobacco Use: Medium Risk     Smoking Tobacco Use:  Former Smoker     Smokeless Tobacco Use: Never Used   Alcohol Use: Not on file   Financial Resource Strain: Not on file   Food Insecurity: Not on file   Transportation Needs: Not on file   Physical Activity: Not on file   Stress: Not on file   Social Connections: Not on file   Intimate Partner Violence: Not on file   Depression: Not at risk     PHQ-2 Score: 0   Housing Stability: Not on file       Functional Status:  Prior to admission patient needed assistance:   Dependent ADLs:: Bathing, Dressing, Eating, Grooming, Incontinence, Positioning, Transfers, Wheelchair-with assist, Toileting  Dependent IADLs:: Cleaning, Cooking, Laundry, Shopping, Meal Preparation, Medication Management, Money Management, Transportation, Incontinence       Mental Health Status:  Mental Health Status: No Current Concerns       Chemical Dependency Status:  Chemical Dependency Status: No Current Concerns             Values/Beliefs:  Spiritual, Cultural Beliefs, Nondenominational Practices, Values that affect care:   NA              Additional Information:  Care Management knows pt and his Mother and Guardian, Savannah, well from past admissions.  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  Writer has had many conversations with Savannah today with many concerns;   1.Pt. has no PCP, his PCP recently moved to [place]. Pt has no ability to see a physician in person.  A virtual appointment has been scheduled on 6/8/22 with Dr Bret Manning.   2.Pt's CADI waiver approved him for a 12 hr nurse and 12 hr PCA daily, unfortunately, agencies are not able to find any nurse, so pt's mother is fulfilling the 12 hr day caregiver. Pt's 12 hr PCA manages the night care giving and he also lives with them.  3.No lift device.  Have discussed getting a lift in the past.  Savannah is now open to this.  Insurance will not cover an electric lift.  Savannah will not have the strength to operate a manual lift, so she is going to rent an electric  lift from [facility].  She was told they could deliver a lift tomorrow.  Pt has a male that comes in 3x per week for 4 hours and plays games with pt.  Pt also has a  two days per week.    Pt will be needing four week of IV antibiotics.  A  Referral has been sent to [facility] for benefit check.  Conversed with [facility] Hospitalist.  Pt is nearing discharge readiness, so this can happen as soon as the home infusion is set-up.   Pt will require stretcher transport home.     Karen Collins, RN   Inpatient Care Management  [phone]

## 2022-06-03 NOTE — PROGRESS NOTES
"CLINICAL NUTRITION SERVICES - REASSESSMENT NOTE    Malnutrition: ()  % Weight Loss:  None noted - wt appears stable in the 160s  % Intake:  No decreased intake noted  Subcutaneous Fat Loss:  (22 -  assessment) Orbital region moderate depletion- baseline  Muscle Loss:  (22 -  Assessment) Temporal region moderate depletion and Clavicle bone region moderate depletion - baseline  Fluid Retention:  None noted     Malnutrition Diagnosis: Patient does not meet two of the above criteria necessary for diagnosing malnutrition     EVALUATION OF PROGRESS TOWARD GOALS   Diet: NPO    Nutrition Support:   Nutrition Support Enteral:  Type of Feeding Tube: Existing G-J tube   Enteral Frequency:  Continuous  Enteral Regimen: Jevity 1.5 at 60 mL/hr x 22 hours per day (holding TF 1 hour before and 1 hour after Synthroid dose)  Total Enteral Provisions: 1980 kcal (27 kcal/kg), 90 g protein (1.2 g/kg), 28 g fiber, 1003 mL H2O  Free Water Flush: 60 mL every 4 hours    Add Banatrol 2 pkts per day = 80 kcal and 4 g fiber   Total (TF + D5 IVF + Banatrol)= 2060 kcal (28 kcal/kg), 32 g fiber     Intake/Tolerance:   - No documented interruptions since last assessment .   - Labs -   Mg 2.4 (H), Phos/K NL. CRP 23 elevated.   Recent Labs   Lab 22  1205 22  0848 22  0445 22  0028 22  2035 22  1642   * 107* 130* 126* 115* 117*     - Stooling: Rectal tube in place. 390 mL out today.    - \"x1\" documented. No exact volume measured   - 450 mL   - 800 mL  - Weight trendin.1 kg yesterday - significantly up since admission wt of 69.4 kg. ?fluid related.   - Meds: High sliding scale insulin, Levothyroxine @ 0730, Reglan 4x daily (on hold), liquid multivitamin, Neutra-Phos replacements 3x daily (baseline), Vitamin C, Vitamin D3.     ASSESSED NUTRITION NEEDS:  Dosing Weight 73.6 kg  Estimated Energy Needs: 7375-2242 kcals (25-30 Kcal/Kg)  Justification: maintenance  Estimated " Protein Needs:  grams protein (1.2-1.5 g pro/Kg)  Justification: maintenance  Estimated Fluid Needs: 1 mL/kcal  Justification: maintenance    NEW FINDINGS:   ID following for strep salivarius  6/2 - OMFS consult     Previous Goals:   TF + D5 IVF + Banatrol will meet % estimated needs   Evaluation: Met  Stooling volume will decrease with addition of Banatrol   Evaluation: Met / improving     Previous Nutrition Diagnosis:   Altered GI function related to unknown etiology as evidenced by liquid stool over the last 3 days, need for fiber modular   Evaluation: No change    CURRENT NUTRITION DIAGNOSIS  Altered GI function related to unknown etiology as evidenced by liquid stool over the last 3 days, need for fiber modular     INTERVENTIONS  Recommendations / Nutrition Prescription  Continue TF and Banatrol as ordered  Continue flushes as ordered for now given fluid up    Implementation  Feeding Tube Flush - updated     Goals  TF + Banatrol to meet at least 90% estimated needs.       MONITORING AND EVALUATION:  Progress towards goals will be monitored and evaluated per protocol and Practice Guidelines    Marisel Fernández RD, LD  [facility], [facility], [facility]  Pager: [phone]  Weekend Pager: [phone]

## 2022-06-03 NOTE — PLAN OF CARE
DATE & TIME: 6/02/2022 1900- 6/03/22 0730  Cognitive Concerns/ Orientation : Alert. HECTOR orientation. Pt nonverbal, makes noises at times. Smiles and gives thumbs up.   BEHAVIOR & AGGRESSION TOOL COLOR: Green   ABNL VS/O2: VSS on RA  MOBILITY: Total cares, up with lift, assist of 2 to turn/repo.   PAIN MANAGMENT: No signs of pain  DIET: Tube feed infusing 60cc/hr. H2O flushes 60cc/q4h.  BOWEL/BLADDER: Incontinent of bowel and bladder. External catheter in place and rectal tube in place.  ABNL LAB/BG: , 126 and 130.  DRAIN/DEVICES: PIV x2, saline locked. PEG tube. External cath. Rectal tube.   TELEMETRY RHYTHM: NA  TESTS/PROCEDURES: Asymptomatic COVID swab needed, pt refused several attempts.  SKIN: Scattered bruising. Blanchable redness to coccyx. Generalized edema +2.  D/C DATE: Discharge 2-3 days pending progress  OTHER IMPORTANT INFO: Lung sounds coarse crackles throughout. Weak, nonproductive cough. Oral suction x1. ID following. Pt refused asymptomatic COVID swab.  Contact precautions maintained.

## 2022-06-03 NOTE — PROGRESS NOTES
[facility]    Medicine Progress Note - Hospitalist Service    Date of Admission:  5/26/2022    Assessment & Plan        Keyon Farias is a 61 year old male with history including TBI with aphasia, R sided spastic hemiplegia and dysphagia with GJ-tube for TFs/meds; seizure disorder; panhypopituitarism; h/o multiple hospital admissions for recurrent pneumonia (last in 12/2021); who presented 5/26/2022 with fever and found with evidence of UTI with sepsis.    On 5/27/2022, RRT called and pt found to be in septic shock and transferred to the ICU and was intubated and started on pressors. BC's subsequently positive for Strep salivarius group, UC only with 50-100K mixed urogenital jaime. Able to be weaned off pressors 5/30/2022. Extubated 5/30/2022, transferred out of the ICU 6/1/2022.     Strep salivarius group bacteremia, unclear source.  UTI.  Septic shock, suspect due to above.  Acute hypoxic respiratory failure, possibly related to septic shock, resolved.  Lactic acidosis due to sepsis, resolved.  History of recurrent aspiration pneumonia with acute hypoxic respiratory failure and sepsis.  * H/o multiple hospitalizations for aspiration pneumonia; however, last in 12/2021. Has chronic GJ-tube.  * On admit 5/26: WBC 19.5, lactate 6.9, UA showed 180 WBC, lg LE, positive nitrites. Started on pip-tazo and vanco on admit 5/26. BC 5/26 subsequently positive for Strep salivarius group.   UC 5/26 subsequently grew 50-100K mixed urogenital jaime.   * 5/27: RRT called and transferred to ICU for septic shock and required pressors. CT CAP 5/27 showed possible basilar infiltrates vs. artifact of breathing motion at the lung bases; no definite cause for fever and abdominal pain demonstrated in the abdomen or pelvis; stable cystic lesion in the pancreas. Pip-tazo stopped and started on meropenem 5/27. BC's 5/27 NGTD.  * 5/29: Antibiotics changed to ceftriaxone.  * 5/30: Pressors stopped and extubated.  *  "6/1: Off O2.  *6/3: Appreciate OMFS consult - no need for teeth extraction. Continue oral cares. Per ID, patient will need 4 weeks of IV ceftriaxone. Midline placement today.                  Recent Labs   Lab 06/02/22  0750 05/29/22  0412 05/28/22  1157 05/28/22  0437 05/27/22  2354 05/27/22  1832 05/27/22  1411 05/27/22  0835 05/27/22  0650 05/26/22  2337 05/26/22  1523 05/26/22  1519   WBC 8.8 10.2  --  20.2*  --   --  30.7*  --  28.5*  --  19.5*  --    LACT  --   --  3.8*  --  6.6* 6.8* 6.9* 0.7  --  1.8  --  1.4   - Continue ceftriaxone (started 5/29) - plan 2 weeks antibiotics for now (stop 6/9).  -Appreciate ID recommendations (echocardiogram, OMFS consultation) regarding strep salivarius group bacteremia,  - Continue PTA acetylcysteine + albuterol nebs and PRN nebs.  - Continue IS, pulmonary toilet.  - Monitor cultures.  -Echo as below, no evidence of vegetations, LVEF 55-60%  -OMFS recommendations following dental/facial bone CT: input appreciated; no need for dental extractions at this time; \"more likely source is mucosa of oral cavity or upper airway, as this is where s. salivarius is commonly found\".  -- continue ceftriaxone per ID recs.      Volume overload, suspect from fluid resuscitation.  * Given IV furosemide x1 for crackles/chest congestion on 6/1 with good response.  - Monitor clinically for need for further diuretics holding off in the absence of any O2 requirement.  - Monitor i/o's, daily wts.     Anemia, suspect dilutional component.  * Hgb normal on admit. No overt clinical signs of major bleeding.  Recent Labs   Lab 06/02/22  0750 05/29/22  0412 05/28/22  0437 05/27/22  1411 05/27/22  0650 05/26/22  1523   HGB 10.5* 10.6* 11.2* 11.6* 14.0 13.8   - Monitor CBC.  - Consider prbc transfusion if hgb </= 7.0 or if significant bleeding with hemodynamic instability or if symptomatic.     Thrombocytopenia, improving, unclear etiology, possibly secondary to bacteremia and/or medication effect or " chronic from other cause.  * Plts normal on admit.  * Plts down to 144K on 5/28.           Recent Labs   Lab 06/02/22  0750 05/29/22  0412 05/28/22  0437 05/27/22  1411 05/27/22  0650 05/26/22  1523    143* 144* 168 161 186   - Monitor CBC.  - Consider platelet transfusion if needs a procedure and less than 50,000; or if less than 10,000.     TBI with aphasia, dysphagia, and R sided spastic hemiplegia.   Dysphagia with GJ-tube for TFs.  * Patient's mother is his caregiver, along with home care attendants. Has had multiple hospitalizations for recurrent pneumonias, but last in 12/2021.  - Continue TF's.  - holding PTA scheduled metoclopramide in setting of diarrhea.  - Resume PRN scopolamine at discharge.  - Continue PT, OT.     Seizure disorder.  Secondary to TBI. Chronic and stable on PTA AEDs.  - Continue brivaracetam, carbamazepine.     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  * Started on stress dose of IV hydrocortisone in the ICU.  -Status post stress steroids while in septic shock, now quickly tapered down and  back to normal PTA PO dosing (15 mg qam and 5 mg qpm at 2 pm) resumed on 6/2.  - Continue levothyroxine.  - Continue testosterone IM weekly.  -He remains euglycemic on 6/3/2022 discontinue glycemic checks  -Place patient out of bed     GERD.  - Continue pantoprazole.     Diarrhea requiring fecal management system, has been on antibiotics since day of admission, afebrile, normal WBCs, no abdominal pain on exam.  - Holding Reglan as above  - If spikes fever or has abdominal pain would recommend checking C. 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  History of DVT.  - Noted.  -Currently hemodynamically stable so we will discontinue telemetry monitoring           Clinically Significant Risk Factors Present on Admission                  COVID-19 testing.               COVID-19 PCR Results    COVID-19 PCR Results 8/9/21 8/24/21 9/30/21 10/28/21 11/12/21 11/24/21 12/5/21 12/27/21 5/9/22 5/26/22    COVID-19 Virus PCR to [facility] - Result                       COVID-19 Virus PCR to [facility] - Source                       SARS-CoV-2 Virus Specimen Source                       Flu A/B & SARS-COV-2 PCR Source                       SARS-CoV-2 PCR Result                       SARS CoV2 PCR Negative Negative Negative Negative Negative Negative Negative Negative Negative Negative       Comments are available for some flowsheets but are not being displayed.           COVID-19 Antibody Results, Testing for Immunity    COVID-19 Antibody Results, Testing for Immunity   No data to display.                    Diet: NPO for Medical/Clinical Reasons Except for: No Exceptions  Adult Formula Drip Feeding: Continuous Jevity 1.5; Jejunostomy; Goal Rate: 60; mL/hr; Medication - Feeding Tube Flush Frequency: At least 15-30 mL water before and after medication administration and with tube clogging; Amount to Send (Nutrition u...    DVT Prophylaxis: Pneumatic Compression Devices  Lerma Catheter: Not present  Central Lines: None  Cardiac Monitoring: None  Code Status: Full Code      Disposition Plan   Expected Discharge: 06/04/2022     Anticipated discharge location:  Awaiting care coordination huddle  Delays:          The patient's care was discussed with the Patient's Family.    Deloris Moffett MD  Hospitalist Service  [facility]  Securely message with the Vocera Web Console (learn more here)  Text page via [employer] Paging/Directory         Clinically Significant Risk Factors Present on Admission                    ______________________________________________________________________    Interval History   No complaints  Per mother who is bedside, patient appears more tired today. She will also need more help with patient at home on discharge. Also requesting a nidia lift.     Data reviewed today: I reviewed all medications, new labs and imaging results over the last 24 hours. I personally reviewed no images or  EKG's today.    Physical Exam   Vital Signs: Temp: 97.9  F (36.6  C) Temp src: Axillary BP: 135/59 Pulse: 67   Resp: 16 SpO2: 97 % O2 Device: None (Room air)    Weight: 183 lbs 3.2 oz  General Appearance: Well appearing for stated age.  Respiratory: CTAB, no rales or ronchi  Cardiovascular: S1, S2 normal, no murmurs  GI: non-tender on palpation, BS present      Data   Recent Labs   Lab 06/03/22  1205 06/03/22  0848 06/03/22  0801 06/03/22  0445 06/02/22  0830 06/02/22  0750 06/01/22  0812 06/01/22  0335 05/31/22  2036 05/31/22  1758 05/31/22  0655 05/31/22  0540 05/30/22  0614 05/30/22  0425 05/29/22  1148 05/29/22  1145 05/29/22  0500 05/29/22  0412 05/28/22  0807 05/28/22  0437   WBC  --   --   --   --   --  8.8  --   --   --   --   --   --   --   --   --   --   --  10.2  --  20.2*   HGB  --   --   --   --   --  10.5*  --   --   --   --   --   --   --   --   --   --   --  10.6*  --  11.2*   MCV  --   --   --   --   --  93  --   --   --   --   --   --   --   --   --   --   --  93  --  94   PLT  --   --   --   --   --  165  --   --   --   --   --   --   --   --   --   --   --  143*  --  144*   NA  --   --   --   --   --  142  --  142  --  139   < > 140   < > 149*   < > 157*  --  159*   < > 144   POTASSIUM  --   --  3.8  --   --  4.1  --  3.8  --   --   --  4.1   < > 3.5   < > 3.5  --  4.5   < > 2.1*   CHLORIDE  --   --   --   --   --  111*  --   --   --   --   --   --   --  118*  --  126*  --  130*   < > 110*   CO2  --   --   --   --   --  28  --   --   --   --   --   --   --  28  --  27  --  28   < > 24   BUN  --   --   --   --   --  21  --   --   --   --   --   --   --  9  --  10  --  11   < > 17   CR  --   --   --   --   --  0.64*  --  0.71  --   --   --  0.71  --  0.74  --  0.81  --  0.83   < > 1.08   ANIONGAP  --   --   --   --   --  3  --   --   --   --   --   --   --  3  --  4  --  1*   < > 10   STEVE  --   --   --   --   --  7.3*  --   --   --   --   --   --   --  7.0*  --  7.6*  --  8.1*   < > 7.6*   *  107*  --  130*   < > 76   < >  --    < >  --    < > 121*   < > 121*   < > 172*   < > 183*   < > 356*    < > = values in this interval not displayed.     No results found for this or any previous visit (from the past 24 hour(s)).

## 2022-06-04 LAB
CRP SERPL-MCNC: 32.7 MG/L (ref 0–8)
GLUCOSE BLDC GLUCOMTR-MCNC: 106 MG/DL (ref 70–99)
GLUCOSE BLDC GLUCOMTR-MCNC: 140 MG/DL (ref 70–99)
GLUCOSE BLDC GLUCOMTR-MCNC: 150 MG/DL (ref 70–99)
GLUCOSE BLDC GLUCOMTR-MCNC: 87 MG/DL (ref 70–99)
GLUCOSE BLDC GLUCOMTR-MCNC: 87 MG/DL (ref 70–99)
GLUCOSE BLDC GLUCOMTR-MCNC: 92 MG/DL (ref 70–99)
MAGNESIUM SERPL-MCNC: 2.1 MG/DL (ref 1.6–2.3)
PHOSPHATE SERPL-MCNC: 2.6 MG/DL (ref 2.5–4.5)
POTASSIUM BLD-SCNC: 4.3 MMOL/L (ref 3.4–5.3)

## 2022-06-04 PROCEDURE — 999N000127 HC STATISTIC PERIPHERAL IV START W US GUIDANCE

## 2022-06-04 PROCEDURE — 86140 C-REACTIVE PROTEIN: CPT | Performed by: HOSPITALIST

## 2022-06-04 PROCEDURE — 83735 ASSAY OF MAGNESIUM: CPT | Performed by: HOSPITALIST

## 2022-06-04 PROCEDURE — 250N000011 HC RX IP 250 OP 636: Performed by: INTERNAL MEDICINE

## 2022-06-04 PROCEDURE — 36415 COLL VENOUS BLD VENIPUNCTURE: CPT | Performed by: HOSPITALIST

## 2022-06-04 PROCEDURE — 250N000013 HC RX MED GY IP 250 OP 250 PS 637: Performed by: INTERNAL MEDICINE

## 2022-06-04 PROCEDURE — 99232 SBSQ HOSP IP/OBS MODERATE 35: CPT | Performed by: HOSPITALIST

## 2022-06-04 PROCEDURE — 84132 ASSAY OF SERUM POTASSIUM: CPT | Performed by: HOSPITALIST

## 2022-06-04 PROCEDURE — 84100 ASSAY OF PHOSPHORUS: CPT | Performed by: HOSPITALIST

## 2022-06-04 PROCEDURE — 94640 AIRWAY INHALATION TREATMENT: CPT

## 2022-06-04 PROCEDURE — 999N000157 HC STATISTIC RCP TIME EA 10 MIN

## 2022-06-04 PROCEDURE — 250N000009 HC RX 250: Performed by: INTERNAL MEDICINE

## 2022-06-04 PROCEDURE — 250N000013 HC RX MED GY IP 250 OP 250 PS 637: Performed by: HOSPITALIST

## 2022-06-04 PROCEDURE — 94642 AEROSOL INHALATION TREATMENT: CPT

## 2022-06-04 PROCEDURE — 120N000001 HC R&B MED SURG/OB

## 2022-06-04 PROCEDURE — 94640 AIRWAY INHALATION TREATMENT: CPT | Mod: 76

## 2022-06-04 RX ADMIN — CARBAMAZEPINE 150 MG: 100 SUSPENSION ORAL at 00:32

## 2022-06-04 RX ADMIN — POTASSIUM & SODIUM PHOSPHATES POWDER PACK 280-160-250 MG 1 PACKET: 280-160-250 PACK at 14:18

## 2022-06-04 RX ADMIN — CALCIUM CARBONATE 1250 MG: 1250 SUSPENSION ORAL at 08:46

## 2022-06-04 RX ADMIN — CARBAMAZEPINE 150 MG: 100 SUSPENSION ORAL at 06:45

## 2022-06-04 RX ADMIN — MICONAZOLE NITRATE: 20 POWDER TOPICAL at 21:38

## 2022-06-04 RX ADMIN — OXYCODONE HYDROCHLORIDE AND ACETAMINOPHEN 1000 MG: 500 TABLET ORAL at 08:47

## 2022-06-04 RX ADMIN — CARBAMAZEPINE 100 MG: 100 SUSPENSION ORAL at 18:13

## 2022-06-04 RX ADMIN — CHLORHEXIDINE GLUCONATE 15 ML: 1.2 SOLUTION ORAL at 08:47

## 2022-06-04 RX ADMIN — ALBUTEROL SULFATE 2.5 MG: 2.5 SOLUTION RESPIRATORY (INHALATION) at 12:22

## 2022-06-04 RX ADMIN — ACETYLCYSTEINE 2 ML: 200 SOLUTION ORAL; RESPIRATORY (INHALATION) at 15:41

## 2022-06-04 RX ADMIN — CHLORHEXIDINE GLUCONATE 15 ML: 1.2 SOLUTION ORAL at 21:37

## 2022-06-04 RX ADMIN — CALCIUM CARBONATE 1250 MG: 1250 SUSPENSION ORAL at 21:38

## 2022-06-04 RX ADMIN — CALCIUM CARBONATE 1250 MG: 1250 SUSPENSION ORAL at 14:18

## 2022-06-04 RX ADMIN — ACETYLCYSTEINE 2 ML: 200 SOLUTION ORAL; RESPIRATORY (INHALATION) at 09:14

## 2022-06-04 RX ADMIN — Medication 40 MG: at 08:46

## 2022-06-04 RX ADMIN — BRIVARACETAM 100 MG: 10 SOLUTION ORAL at 08:46

## 2022-06-04 RX ADMIN — Medication 1 PACKET: at 16:25

## 2022-06-04 RX ADMIN — ACETYLCYSTEINE 2 ML: 200 SOLUTION ORAL; RESPIRATORY (INHALATION) at 19:08

## 2022-06-04 RX ADMIN — ALBUTEROL SULFATE 2.5 MG: 2.5 SOLUTION RESPIRATORY (INHALATION) at 19:08

## 2022-06-04 RX ADMIN — POTASSIUM & SODIUM PHOSPHATES POWDER PACK 280-160-250 MG 1 PACKET: 280-160-250 PACK at 21:38

## 2022-06-04 RX ADMIN — ALBUTEROL SULFATE 2.5 MG: 2.5 SOLUTION RESPIRATORY (INHALATION) at 09:14

## 2022-06-04 RX ADMIN — BRIVARACETAM 100 MG: 10 SOLUTION ORAL at 21:37

## 2022-06-04 RX ADMIN — POTASSIUM & SODIUM PHOSPHATES POWDER PACK 280-160-250 MG 1 PACKET: 280-160-250 PACK at 08:46

## 2022-06-04 RX ADMIN — ASPIRIN 81 MG CHEWABLE TABLET 81 MG: 81 TABLET CHEWABLE at 08:47

## 2022-06-04 RX ADMIN — CARBAMAZEPINE 150 MG: 100 SUSPENSION ORAL at 11:45

## 2022-06-04 RX ADMIN — CEFTRIAXONE SODIUM 2 G: 2 INJECTION, POWDER, FOR SOLUTION INTRAMUSCULAR; INTRAVENOUS at 16:04

## 2022-06-04 RX ADMIN — Medication 15 ML: at 08:46

## 2022-06-04 RX ADMIN — ALBUTEROL SULFATE 2.5 MG: 2.5 SOLUTION RESPIRATORY (INHALATION) at 15:41

## 2022-06-04 RX ADMIN — Medication 1 PACKET: at 08:46

## 2022-06-04 RX ADMIN — LEVOTHYROXINE SODIUM 150 MCG: 100 TABLET ORAL at 06:45

## 2022-06-04 RX ADMIN — MICONAZOLE NITRATE: 20 POWDER TOPICAL at 08:47

## 2022-06-04 RX ADMIN — HYDROCORTISONE 15 MG: 10 TABLET ORAL at 08:53

## 2022-06-04 RX ADMIN — Medication 50 MCG: at 08:47

## 2022-06-04 RX ADMIN — HYDROCORTISONE 5 MG: 5 TABLET ORAL at 14:18

## 2022-06-04 RX ADMIN — ACETYLCYSTEINE 2 ML: 200 SOLUTION ORAL; RESPIRATORY (INHALATION) at 12:19

## 2022-06-04 RX ADMIN — ALBUTEROL SULFATE 2.5 MG: 2.5 SOLUTION RESPIRATORY (INHALATION) at 23:19

## 2022-06-04 ASSESSMENT — ACTIVITIES OF DAILY LIVING (ADL)
ADLS_ACUITY_SCORE: 55
ADLS_ACUITY_SCORE: 55
ADLS_ACUITY_SCORE: 59
ADLS_ACUITY_SCORE: 55
ADLS_ACUITY_SCORE: 59

## 2022-06-04 NOTE — PROGRESS NOTES
[facility]    Medicine Progress Note - Hospitalist Service    Date of Admission:  5/26/2022    Assessment & Plan        Keyon Farias is a 61 year old male with history including TBI with aphasia, R sided spastic hemiplegia and dysphagia with GJ-tube for TFs/meds; seizure disorder; panhypopituitarism; h/o multiple hospital admissions for recurrent pneumonia (last in 12/2021); who presented 5/26/2022 with fever and found with evidence of UTI with sepsis.    On 5/27/2022, RRT called and pt found to be in septic shock and transferred to the ICU and was intubated and started on pressors. BC's subsequently positive for Strep salivarius group, UC only with 50-100K mixed urogenital jaime. Able to be weaned off pressors 5/30/2022. Extubated 5/30/2022, transferred out of the ICU 6/1/2022.     Strep salivarius group bacteremia, unclear source.  UTI.  Septic shock, suspect due to above.  Acute hypoxic respiratory failure, possibly related to septic shock, resolved.  Lactic acidosis due to sepsis, resolved.  History of recurrent aspiration pneumonia with acute hypoxic respiratory failure and sepsis.  * H/o multiple hospitalizations for aspiration pneumonia; however, last in 12/2021. Has chronic GJ-tube.  * On admit 5/26: WBC 19.5, lactate 6.9, UA showed 180 WBC, lg LE, positive nitrites. Started on pip-tazo and vanco on admit 5/26. BC 5/26 subsequently positive for Strep salivarius group.   UC 5/26 subsequently grew 50-100K mixed urogenital jaime.   * 5/27: RRT called and transferred to ICU for septic shock and required pressors. CT CAP 5/27 showed possible basilar infiltrates vs. artifact of breathing motion at the lung bases; no definite cause for fever and abdominal pain demonstrated in the abdomen or pelvis; stable cystic lesion in the pancreas. Pip-tazo stopped and started on meropenem 5/27. BC's 5/27 NGTD.  * 5/29: Antibiotics changed to ceftriaxone.  * 5/30: Pressors stopped and extubated.  *  "6/1: Off O2.  *6/3: Appreciate OMFS consult - no need for teeth extraction. Continue oral cares. Per ID, patient will need 4 weeks of IV ceftriaxone. Midline placement today.                  Recent Labs   Lab 06/02/22  0750 05/29/22  0412 05/28/22  1157 05/28/22  0437 05/27/22  2354 05/27/22  1832 05/27/22  1411 05/27/22  0835 05/27/22  0650 05/26/22  2337 05/26/22  1523 05/26/22  1519   WBC 8.8 10.2  --  20.2*  --   --  30.7*  --  28.5*  --  19.5*  --    LACT  --   --  3.8*  --  6.6* 6.8* 6.9* 0.7  --  1.8  --  1.4   - Continue ceftriaxone (started 5/29) - plan 2 weeks antibiotics for now (stop 6/9).  -Appreciate ID recommendations (echocardiogram, OMFS consultation) regarding strep salivarius group bacteremia,  - Continue PTA acetylcysteine + albuterol nebs and PRN nebs.  - Continue IS, pulmonary toilet.  - Monitor cultures.  -Echo as below, no evidence of vegetations, LVEF 55-60%  -OMFS recommendations following dental/facial bone CT: input appreciated; no need for dental extractions at this time; \"more likely source is mucosa of oral cavity or upper airway, as this is where s. salivarius is commonly found\".  -- continue ceftriaxone per ID recs.      Volume overload, suspect from fluid resuscitation.  * Given IV furosemide x1 for crackles/chest congestion on 6/1 with good response.  - Monitor clinically for need for further diuretics holding off in the absence of any O2 requirement.  - Monitor i/o's, daily wts.     Anemia, suspect dilutional component.  * Hgb normal on admit. No overt clinical signs of major bleeding.  Recent Labs   Lab 06/02/22  0750 05/29/22  0412 05/28/22  0437 05/27/22  1411 05/27/22  0650 05/26/22  1523   HGB 10.5* 10.6* 11.2* 11.6* 14.0 13.8   - Monitor CBC.  - Consider prbc transfusion if hgb </= 7.0 or if significant bleeding with hemodynamic instability or if symptomatic.     Thrombocytopenia, improving, unclear etiology, possibly secondary to bacteremia and/or medication effect or " chronic from other cause.  * Plts normal on admit.  * Plts down to 144K on 5/28.           Recent Labs   Lab 06/02/22  0750 05/29/22  0412 05/28/22  0437 05/27/22  1411 05/27/22  0650 05/26/22  1523    143* 144* 168 161 186   - Monitor CBC.  - Consider platelet transfusion if needs a procedure and less than 50,000; or if less than 10,000.     TBI with aphasia, dysphagia, and R sided spastic hemiplegia.   Dysphagia with GJ-tube for TFs.  * Patient's mother is his caregiver, along with home care attendants. Has had multiple hospitalizations for recurrent pneumonias, but last in 12/2021.  - Continue TF's.  - holding PTA scheduled metoclopramide in setting of diarrhea.  - Resume PRN scopolamine at discharge.  - Continue PT, OT.     Seizure disorder.  Secondary to TBI. Chronic and stable on PTA AEDs.  - Continue brivaracetam, carbamazepine.     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  * Started on stress dose of IV hydrocortisone in the ICU.  -Status post stress steroids while in septic shock, now quickly tapered down and  back to normal PTA PO dosing (15 mg qam and 5 mg qpm at 2 pm) resumed on 6/2.  - Continue levothyroxine.  - Continue testosterone IM weekly.  -He remains euglycemic on 6/3/2022 discontinue glycemic checks  -Place patient out of bed     GERD.  - Continue pantoprazole.     Diarrhea requiring fecal management system, has been on antibiotics since day of admission, afebrile, normal WBCs, no abdominal pain on exam.  - Holding Reglan as above  - If spikes fever or has abdominal pain would recommend checking C. 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  History of DVT.  - Noted.  -Currently hemodynamically stable so we will discontinue telemetry monitoring           Clinically Significant Risk Factors Present on Admission                  COVID-19 testing.               COVID-19 PCR Results    COVID-19 PCR Results 8/9/21 8/24/21 9/30/21 10/28/21 11/12/21 11/24/21 12/5/21 12/27/21 5/9/22 5/26/22    COVID-19 Virus PCR to [facility] - Result                       COVID-19 Virus PCR to [facility] - Source                       SARS-CoV-2 Virus Specimen Source                       Flu A/B & SARS-COV-2 PCR Source                       SARS-CoV-2 PCR Result                       SARS CoV2 PCR Negative Negative Negative Negative Negative Negative Negative Negative Negative Negative       Comments are available for some flowsheets but are not being displayed.           COVID-19 Antibody Results, Testing for Immunity    COVID-19 Antibody Results, Testing for Immunity   No data to display.                    Diet: NPO for Medical/Clinical Reasons Except for: No Exceptions  Adult Formula Drip Feeding: Continuous Jevity 1.5; Jejunostomy; Goal Rate: 60; mL/hr; Medication - Feeding Tube Flush Frequency: At least 15-30 mL water before and after medication administration and with tube clogging; Amount to Send (Nutrition u...    DVT Prophylaxis: Pneumatic Compression Devices  Lerma Catheter: Not present  Central Lines: None  Cardiac Monitoring: None  Code Status: Full Code      Disposition Plan   Expected Discharge: 06/06/2022     Anticipated discharge location: home with help/services;home with family    Delays:          The patient's care was discussed with the Patient's Family.    Deloris Moffett MD  Hospitalist Service  [facility]  Securely message with the Vocera Web Console (learn more here)  Text page via [employer] Paging/Directory         Clinically Significant Risk Factors Present on Admission                    ______________________________________________________________________    Interval History   No complaints  Not in distress  Pending safe discharge planning.    Data reviewed today: I reviewed all medications, new labs and imaging results over the last 24 hours. I personally reviewed no images or EKG's today.    Physical Exam   Vital Signs: Temp: 98  F (36.7  C) Temp src: Axillary BP: 111/54  Pulse: 66   Resp: 20 SpO2: 95 % O2 Device: None (Room air)    Weight: 168 lbs 12.8 oz  General Appearance: Well appearing for stated age.  Respiratory: CTAB, no rales or ronchi  Cardiovascular: S1, S2 normal, no murmurs  GI: non-tender on palpation, BS present      Data   Recent Labs   Lab 06/04/22  1143 06/04/22  0901 06/04/22  0754 06/04/22  0434 06/03/22  0848 06/03/22  0801 06/02/22  0830 06/02/22  0750 06/01/22  0812 06/01/22  0335 05/31/22  2036 05/31/22  1758 05/31/22  0655 05/31/22  0540 05/30/22  0614 05/30/22  0425 05/29/22  1148 05/29/22  1145 05/29/22  0500 05/29/22  0412   WBC  --   --   --   --   --   --   --  8.8  --   --   --   --   --   --   --   --   --   --   --  10.2   HGB  --   --   --   --   --   --   --  10.5*  --   --   --   --   --   --   --   --   --   --   --  10.6*   MCV  --   --   --   --   --   --   --  93  --   --   --   --   --   --   --   --   --   --   --  93   PLT  --   --   --   --   --   --   --  165  --   --   --   --   --   --   --   --   --   --   --  143*   NA  --   --   --   --   --   --   --  142  --  142  --  139   < > 140   < > 149*   < > 157*  --  159*   POTASSIUM  --  4.3  --   --   --  3.8  --  4.1  --  3.8  --   --   --  4.1   < > 3.5   < > 3.5  --  4.5   CHLORIDE  --   --   --   --   --   --   --  111*  --   --   --   --   --   --   --  118*  --  126*  --  130*   CO2  --   --   --   --   --   --   --  28  --   --   --   --   --   --   --  28  --  27  --  28   BUN  --   --   --   --   --   --   --  21  --   --   --   --   --   --   --  9  --  10  --  11   CR  --   --   --   --   --   --   --  0.64*  --  0.71  --   --   --  0.71  --  0.74  --  0.81  --  0.83   ANIONGAP  --   --   --   --   --   --   --  3  --   --   --   --   --   --   --  3  --  4  --  1*   STEVE  --   --   --   --   --   --   --  7.3*  --   --   --   --   --   --   --  7.0*  --  7.6*  --  8.1*   *  --  92 87   < >  --    < > 76   < >  --    < >  --    < > 121*   < > 121*   < > 172*   < > 183*     < > = values in this interval not displayed.     No results found for this or any previous visit (from the past 24 hour(s)).

## 2022-06-04 NOTE — PROGRESS NOTES
DATE & TIME: 06/03/2022 Night    Cognitive Concerns/ Orientation : Pt non-verbal, only vocalizations, Unable to assess; does recognize some staff members from previous admissions; able to shake head no/nod yes and give thumbs up; Hx of TBI   BEHAVIOR & AGGRESSION TOOL COLOR: Green  ABNL VS/O2: VSS, room air  MOBILITY: Total cares, lift; Turn/repo every 2 hours; R side hemiplegia  PAIN MANAGMENT: Denies pain  DIET: NPO; Continuous tube feed at goal rate of 60 mL/hours with 60 mL free water flushes every 4 hours; Flush 40 mL before/after med administration  BOWEL/BLADDER: Incontinent of bowel and bladder; External catheter, rectal tube with 250 mL out overnight  ABNL LAB/BG: Mg 2.4; CRP 23.0; BG 87, 87 (no insulin given)  DRAIN/DEVICES: R PIV saline locked  SKIN: Pale; scattered bruising; blanchable redness to coccyx-mepilex; generalized edema +1/+2  TESTS/PROCEDURES: n/a  D/C DATE: Pending, to home  OTHER IMPORTANT INFO: Meds given crushed through gastric tube

## 2022-06-04 NOTE — PLAN OF CARE
Goal Outcome Evaluation:    DATE & TIME: 06/03/22 [id number]  Cognitive Concerns/ Orientation : Alert. HECTOR orientation. Pt nonverbal, makes noises at times. Smiles and gives thumbs up.   BEHAVIOR & AGGRESSION TOOL COLOR: Green   ABNL VS/O2: VSS on RA  MOBILITY: Total cares, up with lift - not OOB this shift, assist of 2 to turn/repo.   PAIN MANAGMENT: No nonverbal indicators of pain present  DIET: NPO except Tube feed infusing continuously @ 60cc/hr. Free water flushes 60cc/q4h.   BOWEL/BLADDER: Incontinent of bowel and bladder. External catheter in place, leaked and changed x1.  Rectal tube reinserted - in place and not leaking.   ABNL LAB/BG: K+ 3.8, on high protocol replaced in afternoon. Recheck tomorrow am. CRP 23.0. /99  DRAIN/DEVICES: PIV SL. PEG tube. External cath. Rectal tube. PCDs on.   TELEMETRY RHYTHM: NA  TESTS/PROCEDURES: None scheduled.   SKIN: Scattered bruising. Blanchable redness to coccyx. Generalized edema +2.  D/C DATE: Unknown  OTHER IMPORTANT INFO: Contact precautions maintained. Lung sounds dim/coarse.. Good, productive cough, but pt won't let you suction bites down on yonker. Pt only allowed swabbing for oral cares. ID following, will need midline at discharge. Pt refused asymptomatic COVID swab yesterday. No changes. Stable.

## 2022-06-04 NOTE — PLAN OF CARE
DATE & TIME: 06/04/2022 7-3 pm    Cognitive Concerns/ Orientation : Alert. HECTOR orientation. Pt nonverbal, makes noises at times. Smiles and gives thumbs up. Hx of TBI. More lethargic this afternoon.    BEHAVIOR & AGGRESSION TOOL COLOR: Green  ABNL VS/O2: VSS on RA  MOBILITY: Total cares, lift; Turn/repo every 2 hours; R side hemiplegia  PAIN MANAGMENT: Denies pain when asked. Shakes head no.   DIET: NPO; Continuous tube feed at goal rate of 60 mL/hours with 60 mL free water flushes every 4 hours.   BOWEL/BLADDER: Incontinent of bowel and bladder; External catheter. Rectal tube was causing pain, removed. One large loose BM. Pt removed external catheter at times.   ABNL LAB/BG: CRP 32.7  DRAIN/DEVICES: PIV went bad, removed. IV team needed to place new one.   SKIN: Pale; scattered bruising; blanchable redness/moisture breakdown to coccyx-mepilex applied and changed; Generalized edema +1/+2, mainly to bilateral arms. Severe moisture rash to groin, anti fungal powder applied.   TESTS/PROCEDURES: None scheduled.   D/C DATE: Pending, to home? Unsure when.   OTHER IMPORTANT INFO: Contact precautions maintained. Lung sounds coarse. Good, productive cough, but pt won't let you suction and bits down on yonker. ID following, will need midline at discharge.  No changes. Stable.

## 2022-06-05 LAB
GLUCOSE BLDC GLUCOMTR-MCNC: 105 MG/DL (ref 70–99)
GLUCOSE BLDC GLUCOMTR-MCNC: 115 MG/DL (ref 70–99)
GLUCOSE BLDC GLUCOMTR-MCNC: 122 MG/DL (ref 70–99)
GLUCOSE BLDC GLUCOMTR-MCNC: 131 MG/DL (ref 70–99)
GLUCOSE BLDC GLUCOMTR-MCNC: 131 MG/DL (ref 70–99)
GLUCOSE BLDC GLUCOMTR-MCNC: 153 MG/DL (ref 70–99)
GLUCOSE BLDC GLUCOMTR-MCNC: 97 MG/DL (ref 70–99)
MAGNESIUM SERPL-MCNC: 2.2 MG/DL (ref 1.6–2.3)
PHOSPHATE SERPL-MCNC: 2.7 MG/DL (ref 2.5–4.5)
POTASSIUM BLD-SCNC: 4.4 MMOL/L (ref 3.4–5.3)

## 2022-06-05 PROCEDURE — 250N000013 HC RX MED GY IP 250 OP 250 PS 637: Performed by: HOSPITALIST

## 2022-06-05 PROCEDURE — 94640 AIRWAY INHALATION TREATMENT: CPT | Mod: 76

## 2022-06-05 PROCEDURE — 250N000013 HC RX MED GY IP 250 OP 250 PS 637: Performed by: INTERNAL MEDICINE

## 2022-06-05 PROCEDURE — 84132 ASSAY OF SERUM POTASSIUM: CPT | Performed by: HOSPITALIST

## 2022-06-05 PROCEDURE — 999N000157 HC STATISTIC RCP TIME EA 10 MIN

## 2022-06-05 PROCEDURE — 36415 COLL VENOUS BLD VENIPUNCTURE: CPT | Performed by: HOSPITALIST

## 2022-06-05 PROCEDURE — 84100 ASSAY OF PHOSPHORUS: CPT | Performed by: HOSPITALIST

## 2022-06-05 PROCEDURE — 999N000128 HC STATISTIC PERIPHERAL IV START W/O US GUIDANCE

## 2022-06-05 PROCEDURE — 250N000011 HC RX IP 250 OP 636: Performed by: INTERNAL MEDICINE

## 2022-06-05 PROCEDURE — 120N000001 HC R&B MED SURG/OB

## 2022-06-05 PROCEDURE — 94640 AIRWAY INHALATION TREATMENT: CPT

## 2022-06-05 PROCEDURE — 250N000009 HC RX 250: Performed by: INTERNAL MEDICINE

## 2022-06-05 PROCEDURE — 99232 SBSQ HOSP IP/OBS MODERATE 35: CPT | Performed by: HOSPITALIST

## 2022-06-05 PROCEDURE — 94642 AEROSOL INHALATION TREATMENT: CPT

## 2022-06-05 PROCEDURE — 83735 ASSAY OF MAGNESIUM: CPT | Performed by: HOSPITALIST

## 2022-06-05 RX ADMIN — OXYCODONE HYDROCHLORIDE AND ACETAMINOPHEN 1000 MG: 500 TABLET ORAL at 08:35

## 2022-06-05 RX ADMIN — CEFTRIAXONE SODIUM 2 G: 2 INJECTION, POWDER, FOR SOLUTION INTRAMUSCULAR; INTRAVENOUS at 16:30

## 2022-06-05 RX ADMIN — POTASSIUM & SODIUM PHOSPHATES POWDER PACK 280-160-250 MG 1 PACKET: 280-160-250 PACK at 08:35

## 2022-06-05 RX ADMIN — Medication 1 PACKET: at 16:30

## 2022-06-05 RX ADMIN — LEVOTHYROXINE SODIUM 150 MCG: 100 TABLET ORAL at 06:29

## 2022-06-05 RX ADMIN — CARBAMAZEPINE 150 MG: 100 SUSPENSION ORAL at 11:50

## 2022-06-05 RX ADMIN — HYDROCORTISONE 15 MG: 10 TABLET ORAL at 08:35

## 2022-06-05 RX ADMIN — ALBUTEROL SULFATE 2.5 MG: 2.5 SOLUTION RESPIRATORY (INHALATION) at 14:46

## 2022-06-05 RX ADMIN — HYDROCORTISONE 5 MG: 5 TABLET ORAL at 14:28

## 2022-06-05 RX ADMIN — ACETYLCYSTEINE 2 ML: 200 SOLUTION ORAL; RESPIRATORY (INHALATION) at 14:46

## 2022-06-05 RX ADMIN — CALCIUM CARBONATE 1250 MG: 1250 SUSPENSION ORAL at 20:16

## 2022-06-05 RX ADMIN — Medication 40 MG: at 08:44

## 2022-06-05 RX ADMIN — ACETYLCYSTEINE 2 ML: 200 SOLUTION ORAL; RESPIRATORY (INHALATION) at 12:02

## 2022-06-05 RX ADMIN — Medication 1 PACKET: at 08:45

## 2022-06-05 RX ADMIN — ALBUTEROL SULFATE 2.5 MG: 2.5 SOLUTION RESPIRATORY (INHALATION) at 19:38

## 2022-06-05 RX ADMIN — MICONAZOLE NITRATE: 20 POWDER TOPICAL at 08:45

## 2022-06-05 RX ADMIN — CHLORHEXIDINE GLUCONATE 15 ML: 1.2 SOLUTION ORAL at 20:50

## 2022-06-05 RX ADMIN — BRIVARACETAM 100 MG: 10 SOLUTION ORAL at 08:44

## 2022-06-05 RX ADMIN — POTASSIUM & SODIUM PHOSPHATES POWDER PACK 280-160-250 MG 1 PACKET: 280-160-250 PACK at 14:28

## 2022-06-05 RX ADMIN — POTASSIUM & SODIUM PHOSPHATES POWDER PACK 280-160-250 MG 1 PACKET: 280-160-250 PACK at 20:50

## 2022-06-05 RX ADMIN — BRIVARACETAM 100 MG: 10 SOLUTION ORAL at 20:16

## 2022-06-05 RX ADMIN — CARBAMAZEPINE 150 MG: 100 SUSPENSION ORAL at 00:38

## 2022-06-05 RX ADMIN — CARBAMAZEPINE 150 MG: 100 SUSPENSION ORAL at 06:29

## 2022-06-05 RX ADMIN — ACETYLCYSTEINE 2 ML: 200 SOLUTION ORAL; RESPIRATORY (INHALATION) at 07:30

## 2022-06-05 RX ADMIN — ACETYLCYSTEINE 2 ML: 200 SOLUTION ORAL; RESPIRATORY (INHALATION) at 19:38

## 2022-06-05 RX ADMIN — CALCIUM CARBONATE 1250 MG: 1250 SUSPENSION ORAL at 14:28

## 2022-06-05 RX ADMIN — Medication 50 MCG: at 08:35

## 2022-06-05 RX ADMIN — CALCIUM CARBONATE 1250 MG: 1250 SUSPENSION ORAL at 08:44

## 2022-06-05 RX ADMIN — ALBUTEROL SULFATE 2.5 MG: 2.5 SOLUTION RESPIRATORY (INHALATION) at 11:52

## 2022-06-05 RX ADMIN — Medication 15 ML: at 08:45

## 2022-06-05 RX ADMIN — MICONAZOLE NITRATE: 20 POWDER TOPICAL at 20:16

## 2022-06-05 RX ADMIN — ASPIRIN 81 MG CHEWABLE TABLET 81 MG: 81 TABLET CHEWABLE at 08:35

## 2022-06-05 RX ADMIN — CARBAMAZEPINE 100 MG: 100 SUSPENSION ORAL at 17:53

## 2022-06-05 RX ADMIN — ALBUTEROL SULFATE 2.5 MG: 2.5 SOLUTION RESPIRATORY (INHALATION) at 07:23

## 2022-06-05 ASSESSMENT — ACTIVITIES OF DAILY LIVING (ADL)
ADLS_ACUITY_SCORE: 55
ADLS_ACUITY_SCORE: 59
ADLS_ACUITY_SCORE: 55
ADLS_ACUITY_SCORE: 55
ADLS_ACUITY_SCORE: 59
ADLS_ACUITY_SCORE: 55

## 2022-06-05 NOTE — PLAN OF CARE
Goal Outcome Evaluation:    DATE & TIME: 06/04/2022 4403-2554   Cognitive Concerns/ Orientation : Alert. HECTOR orientation. Pt nonverbal, makes noises at times. Smiles and gives thumbs up. Hx of TBI. Calm and alert this evening  BEHAVIOR & AGGRESSION TOOL COLOR: Green  ABNL VS/O2: VSS on RA  MOBILITY: Total cares, lift; Turn/repo every 2 hours; R side hemiplegia  PAIN MANAGMENT: no nonverbal indicators of pain present  DIET: NPO; Continuous tube feed at goal rate of 60 mL/hours with 60 mL free water flushes every 4 hours.   BOWEL/BLADDER: Incontinent of bowel and bladder; External catheter. No BM this shift - Pt removed external catheter at times use urinal when repositioning    ABNL LAB/BG: CRP 32.7  DRAIN/DEVICES: New PIV placed L-shoulder  SKIN: Pale; scattered bruising; blanchable redness/moisture breakdown to coccyx-mepilex in place - Generalized edema +1/+2, mainly to bilateral arms. Reddened severe moisture rash to groin, anti fungal powder applied.   TESTS/PROCEDURES: None scheduled.   D/C DATE: Pending, to home? Unsure when.   OTHER IMPORTANT INFO: Contact precautions maintained. Lung sounds coarse. Good, productive cough, but pt won't let you suction and bits down on yonker. ID following, will need midline at discharge.  No changes. Stable.

## 2022-06-05 NOTE — PLAN OF CARE
DATE & TIME: 06/05/2022 7-3 pm         Cognitive Concerns/ Orientation : Alert. HECTOR orientation. Pt nonverbal, makes noises at times. Smiles and gives thumbs up. Hx of TBI.   BEHAVIOR & AGGRESSION TOOL COLOR: Green  ABNL VS/O2: VSS on RA  MOBILITY: Total cares, lift; Turn/repo every 2 hours; R side hemiplegia.   PAIN MANAGMENT: Denies pain  DIET: NPO; Continuous tube feed at goal rate of 60 mL/hours with 60 mL free water flushes every 4 hours. G-J tube port of feeding switched to what is beleieved to be the J site per mother (main care taker) and the information I could find. IR needs to be called in the morning to clarify which port is which,   BOWEL/BLADDER: Incontinent of bowel and bladder; External catheter in place. A lot of output. One large loose watery BM x1.   ABNL LAB/BG: /97  DRAIN/DEVICES: PIV SL  SKIN: Pale; scattered bruising/abrasions; blanchable redness/moisture breakdown to coccyx-mepilex changed; Generalized edema +1/+2, mainly to bilateral arms. Severe moisture rash to groin, anti fungal powder applied.   TESTS/PROCEDURES: N/A  D/C DATE: Pending, mother requesting update from MD when pt is leaving. She wants to bring him home. Writer paged MD with no response.   OTHER IMPORTANT INFO: Contact precautions maintained. More alert and appears overall better today. No changes. Stable.

## 2022-06-05 NOTE — PROGRESS NOTES
DATE & TIME: 06/04/2022 Night                    Cognitive Concerns/ Orientation : Pt non-verbal, only vocalizations, Unable to assess; does recognize some staff members from previous admissions; able to shake head no/nod yes and give thumbs up; Hx of TBI   BEHAVIOR & AGGRESSION TOOL COLOR: Green  ABNL VS/O2: VSS, room air  MOBILITY: Total cares, lift; Turn/repo every 2 hours; R side hemiplegia  PAIN MANAGMENT: Denies pain  DIET: NPO; Continuous tube feed at goal rate of 60 mL/hours with 60 mL free water flushes every 4 hours; Flush 40 mL before/after med administration  BOWEL/BLADDER: Incontinent of bowel and bladder; External catheter with good output  ABNL LAB/BG: CRP 32.7; ; 153 (1 unit given)  DRAIN/DEVICES: L shoulder PIV saline locked  SKIN: Pale; scattered bruising; blanchable redness to coccyx-mepilex; generalized edema +1/+2; redness/rash to groin--miconazole applied  TESTS/PROCEDURES: n/a  D/C DATE: Pending, to home  OTHER IMPORTANT INFO: Meds given crushed through gastric tube; Contact precautions maintained

## 2022-06-05 NOTE — PROGRESS NOTES
[facility]    Medicine Progress Note - Hospitalist Service    Date of Admission:  5/26/2022    Assessment & Plan        Keyon Farias is a 61 year old male with history including TBI with aphasia, R sided spastic hemiplegia and dysphagia with GJ-tube for TFs/meds; seizure disorder; panhypopituitarism; h/o multiple hospital admissions for recurrent pneumonia (last in 12/2021); who presented 5/26/2022 with fever and found with evidence of UTI with sepsis.    On 5/27/2022, RRT called and pt found to be in septic shock and transferred to the ICU and was intubated and started on pressors. BC's subsequently positive for Strep salivarius group, UC only with 50-100K mixed urogenital jaime. Able to be weaned off pressors 5/30/2022. Extubated 5/30/2022, transferred out of the ICU 6/1/2022.     Strep salivarius group bacteremia, unclear source.  UTI.  Septic shock, suspect due to above.  Acute hypoxic respiratory failure, possibly related to septic shock, resolved.  Lactic acidosis due to sepsis, resolved.  History of recurrent aspiration pneumonia with acute hypoxic respiratory failure and sepsis.  * H/o multiple hospitalizations for aspiration pneumonia; however, last in 12/2021. Has chronic GJ-tube.  * On admit 5/26: WBC 19.5, lactate 6.9, UA showed 180 WBC, lg LE, positive nitrites. Started on pip-tazo and vanco on admit 5/26. BC 5/26 subsequently positive for Strep salivarius group.   UC 5/26 subsequently grew 50-100K mixed urogenital jaime.   * 5/27: RRT called and transferred to ICU for septic shock and required pressors. CT CAP 5/27 showed possible basilar infiltrates vs. artifact of breathing motion at the lung bases; no definite cause for fever and abdominal pain demonstrated in the abdomen or pelvis; stable cystic lesion in the pancreas. Pip-tazo stopped and started on meropenem 5/27. BC's 5/27 NGTD.  * 5/29: Antibiotics changed to ceftriaxone.  * 5/30: Pressors stopped and extubated.  *  "6/1: Off O2.  *6/3: Appreciate OMFS consult - no need for teeth extraction. Continue oral cares. Per ID, patient will need 4 weeks of IV ceftriaxone. Midline placement today.                  Recent Labs   Lab 06/02/22  0750 05/29/22  0412 05/28/22  1157 05/28/22  0437 05/27/22  2354 05/27/22  1832 05/27/22  1411 05/27/22  0835 05/27/22  0650 05/26/22  2337 05/26/22  1523 05/26/22  1519   WBC 8.8 10.2  --  20.2*  --   --  30.7*  --  28.5*  --  19.5*  --    LACT  --   --  3.8*  --  6.6* 6.8* 6.9* 0.7  --  1.8  --  1.4   - Continue ceftriaxone (started 5/29) - plan 2 weeks antibiotics for now (stop 6/9).  -Appreciate ID recommendations (echocardiogram, OMFS consultation) regarding strep salivarius group bacteremia,  - Continue PTA acetylcysteine + albuterol nebs and PRN nebs.  - Continue IS, pulmonary toilet.  - Monitor cultures.  -Echo as below, no evidence of vegetations, LVEF 55-60%  -OMFS recommendations following dental/facial bone CT: input appreciated; no need for dental extractions at this time; \"more likely source is mucosa of oral cavity or upper airway, as this is where s. salivarius is commonly found\".  -- continue ceftriaxone per ID recs.      Volume overload, suspect from fluid resuscitation.  * Given IV furosemide x1 for crackles/chest congestion on 6/1 with good response.  - Monitor clinically for need for further diuretics holding off in the absence of any O2 requirement.  - Monitor i/o's, daily wts.     Anemia, suspect dilutional component.  * Hgb normal on admit. No overt clinical signs of major bleeding.  Recent Labs   Lab 06/02/22  0750 05/29/22  0412 05/28/22  0437 05/27/22  1411 05/27/22  0650 05/26/22  1523   HGB 10.5* 10.6* 11.2* 11.6* 14.0 13.8   - Monitor CBC.  - Consider prbc transfusion if hgb </= 7.0 or if significant bleeding with hemodynamic instability or if symptomatic.     Thrombocytopenia, improving, unclear etiology, possibly secondary to bacteremia and/or medication effect or " chronic from other cause.  * Plts normal on admit.  * Plts down to 144K on 5/28.           Recent Labs   Lab 06/02/22  0750 05/29/22  0412 05/28/22  0437 05/27/22  1411 05/27/22  0650 05/26/22  1523    143* 144* 168 161 186   - Monitor CBC.  - Consider platelet transfusion if needs a procedure and less than 50,000; or if less than 10,000.     TBI with aphasia, dysphagia, and R sided spastic hemiplegia.   Dysphagia with GJ-tube for TFs.  * Patient's mother is his caregiver, along with home care attendants. Has had multiple hospitalizations for recurrent pneumonias, but last in 12/2021.  - Continue TF's.  - holding PTA scheduled metoclopramide in setting of diarrhea.  - Resume PRN scopolamine at discharge.  - Continue PT, OT.     Seizure disorder.  Secondary to TBI. Chronic and stable on PTA AEDs.  - Continue brivaracetam, carbamazepine.     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  * Started on stress dose of IV hydrocortisone in the ICU.  -Status post stress steroids while in septic shock, now quickly tapered down and  back to normal PTA PO dosing (15 mg qam and 5 mg qpm at 2 pm) resumed on 6/2.  - Continue levothyroxine.  - Continue testosterone IM weekly.  -He remains euglycemic on 6/3/2022 discontinue glycemic checks  -Place patient out of bed     GERD.  - Continue pantoprazole.     Diarrhea requiring fecal management system, has been on antibiotics since day of admission, afebrile, normal WBCs, no abdominal pain on exam.  - Holding Reglan as above  - If spikes fever or has abdominal pain would recommend checking C. 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  History of DVT.  - Noted.  -Currently hemodynamically stable so we will discontinue telemetry monitoring           Clinically Significant Risk Factors Present on Admission                  COVID-19 testing.               COVID-19 PCR Results    COVID-19 PCR Results 8/9/21 8/24/21 9/30/21 10/28/21 11/12/21 11/24/21 12/5/21 12/27/21 5/9/22 5/26/22    COVID-19 Virus PCR to [facility] - Result                       COVID-19 Virus PCR to [facility] - Source                       SARS-CoV-2 Virus Specimen Source                       Flu A/B & SARS-COV-2 PCR Source                       SARS-CoV-2 PCR Result                       SARS CoV2 PCR Negative Negative Negative Negative Negative Negative Negative Negative Negative Negative       Comments are available for some flowsheets but are not being displayed.           COVID-19 Antibody Results, Testing for Immunity    COVID-19 Antibody Results, Testing for Immunity   No data to display.                    Diet: NPO for Medical/Clinical Reasons Except for: No Exceptions  Adult Formula Drip Feeding: Continuous Jevity 1.5; Jejunostomy; Goal Rate: 60; mL/hr; Medication - Feeding Tube Flush Frequency: At least 15-30 mL water before and after medication administration and with tube clogging; Amount to Send (Nutrition u...    DVT Prophylaxis: Pneumatic Compression Devices  Lerma Catheter: Not present  Central Lines: None  Cardiac Monitoring: None  Code Status: Full Code      Disposition Plan   Expected Discharge: 06/06/2022     Anticipated discharge location: home with help/services;home with family    Delays:          The patient's care was discussed with the Patient's Family.    Deloris Moffett MD  Hospitalist Service  [facility]  Securely message with the Vocera Web Console (learn more here)  Text page via [employer] Paging/Directory         Clinically Significant Risk Factors Present on Admission                    ______________________________________________________________________    Interval History   No complaints  Not in distress  Pending safe discharge planning.    Data reviewed today: I reviewed all medications, new labs and imaging results over the last 24 hours. I personally reviewed no images or EKG's today.    Physical Exam   Vital Signs: Temp: 98.1  F (36.7  C) Temp src: Axillary BP: 106/49  Pulse: 58   Resp: 22 SpO2: 98 % O2 Device: None (Room air)    Weight: 170 lbs 6.4 oz  General Appearance: Well appearing for stated age.  Respiratory: CTAB, no rales or ronchi  Cardiovascular: S1, S2 normal, no murmurs  GI: non-tender on palpation, BS present      Data   Recent Labs   Lab 06/05/22  1149 06/05/22  1103 06/05/22  0836 06/05/22  0355 06/04/22  1143 06/04/22  0901 06/03/22  0848 06/03/22  0801 06/02/22  0830 06/02/22  0750 06/01/22  0812 06/01/22  0335 05/31/22  2036 05/31/22  1758 05/31/22  0655 05/31/22  0540 05/30/22  0614 05/30/22  0425   WBC  --   --   --   --   --   --   --   --   --  8.8  --   --   --   --   --   --   --   --    HGB  --   --   --   --   --   --   --   --   --  10.5*  --   --   --   --   --   --   --   --    MCV  --   --   --   --   --   --   --   --   --  93  --   --   --   --   --   --   --   --    PLT  --   --   --   --   --   --   --   --   --  165  --   --   --   --   --   --   --   --    NA  --   --   --   --   --   --   --   --   --  142  --  142  --  139   < > 140   < > 149*   POTASSIUM  --  4.4  --   --   --  4.3  --  3.8  --  4.1  --  3.8  --   --   --  4.1   < > 3.5   CHLORIDE  --   --   --   --   --   --   --   --   --  111*  --   --   --   --   --   --   --  118*   CO2  --   --   --   --   --   --   --   --   --  28  --   --   --   --   --   --   --  28   BUN  --   --   --   --   --   --   --   --   --  21  --   --   --   --   --   --   --  9   CR  --   --   --   --   --   --   --   --   --  0.64*  --  0.71  --   --   --  0.71  --  0.74   ANIONGAP  --   --   --   --   --   --   --   --   --  3  --   --   --   --   --   --   --  3   STEVE  --   --   --   --   --   --   --   --   --  7.3*  --   --   --   --   --   --   --  7.0*   GLC 97  --  105* 153*   < >  --    < >  --    < > 76   < >  --    < >  --    < > 121*   < > 121*    < > = values in this interval not displayed.     No results found for this or any previous visit (from the past 24 hour(s)).

## 2022-06-06 ENCOUNTER — HOME INFUSION (PRE-WILLOW HOME INFUSION) (OUTPATIENT)
Dept: PHARMACY | Facility: CLINIC | Age: 62
End: 2022-06-06
Payer: MEDICARE

## 2022-06-06 LAB
ANION GAP SERPL CALCULATED.3IONS-SCNC: 7 MMOL/L (ref 3–14)
BUN SERPL-MCNC: 20 MG/DL (ref 7–30)
CALCIUM SERPL-MCNC: 7.9 MG/DL (ref 8.5–10.1)
CHLORIDE BLD-SCNC: 96 MMOL/L (ref 94–109)
CO2 SERPL-SCNC: 25 MMOL/L (ref 20–32)
CREAT SERPL-MCNC: 0.76 MG/DL (ref 0.66–1.25)
GFR SERPL CREATININE-BSD FRML MDRD: >90 ML/MIN/1.73M2
GLUCOSE BLD-MCNC: 103 MG/DL (ref 70–99)
GLUCOSE BLDC GLUCOMTR-MCNC: 118 MG/DL (ref 70–99)
GLUCOSE BLDC GLUCOMTR-MCNC: 118 MG/DL (ref 70–99)
GLUCOSE BLDC GLUCOMTR-MCNC: 122 MG/DL (ref 70–99)
GLUCOSE BLDC GLUCOMTR-MCNC: 129 MG/DL (ref 70–99)
GLUCOSE BLDC GLUCOMTR-MCNC: 139 MG/DL (ref 70–99)
GLUCOSE BLDC GLUCOMTR-MCNC: 99 MG/DL (ref 70–99)
MAGNESIUM SERPL-MCNC: 2.3 MG/DL (ref 1.6–2.3)
PHOSPHATE SERPL-MCNC: 2.4 MG/DL (ref 2.5–4.5)
POTASSIUM BLD-SCNC: 4.2 MMOL/L (ref 3.4–5.3)
SODIUM SERPL-SCNC: 128 MMOL/L (ref 133–144)

## 2022-06-06 PROCEDURE — 250N000009 HC RX 250: Performed by: INTERNAL MEDICINE

## 2022-06-06 PROCEDURE — 94640 AIRWAY INHALATION TREATMENT: CPT

## 2022-06-06 PROCEDURE — 120N000001 HC R&B MED SURG/OB

## 2022-06-06 PROCEDURE — 250N000013 HC RX MED GY IP 250 OP 250 PS 637: Performed by: HOSPITALIST

## 2022-06-06 PROCEDURE — 272N000432 HC KIT CATH IV 18 OR 20 G, POWERGLIDE BASIC

## 2022-06-06 PROCEDURE — 36569 INSJ PICC 5 YR+ W/O IMAGING: CPT

## 2022-06-06 PROCEDURE — 99233 SBSQ HOSP IP/OBS HIGH 50: CPT | Performed by: HOSPITALIST

## 2022-06-06 PROCEDURE — 36415 COLL VENOUS BLD VENIPUNCTURE: CPT | Performed by: INTERNAL MEDICINE

## 2022-06-06 PROCEDURE — 83735 ASSAY OF MAGNESIUM: CPT | Performed by: INTERNAL MEDICINE

## 2022-06-06 PROCEDURE — 250N000013 HC RX MED GY IP 250 OP 250 PS 637: Performed by: INTERNAL MEDICINE

## 2022-06-06 PROCEDURE — 999N000157 HC STATISTIC RCP TIME EA 10 MIN

## 2022-06-06 PROCEDURE — 84100 ASSAY OF PHOSPHORUS: CPT | Performed by: INTERNAL MEDICINE

## 2022-06-06 PROCEDURE — 258N000003 HC RX IP 258 OP 636: Performed by: HOSPITALIST

## 2022-06-06 PROCEDURE — 80048 BASIC METABOLIC PNL TOTAL CA: CPT | Performed by: INTERNAL MEDICINE

## 2022-06-06 PROCEDURE — 94640 AIRWAY INHALATION TREATMENT: CPT | Mod: 76

## 2022-06-06 PROCEDURE — 250N000011 HC RX IP 250 OP 636: Performed by: INTERNAL MEDICINE

## 2022-06-06 RX ORDER — CHLORHEXIDINE GLUCONATE ORAL RINSE 1.2 MG/ML
15 SOLUTION DENTAL 2 TIMES DAILY
Qty: 420 ML | Refills: 0 | Status: ON HOLD | OUTPATIENT
Start: 2022-06-06 | End: 2022-06-30

## 2022-06-06 RX ORDER — SODIUM CHLORIDE 9 MG/ML
INJECTION, SOLUTION INTRAVENOUS CONTINUOUS
Status: DISCONTINUED | OUTPATIENT
Start: 2022-06-06 | End: 2022-06-08 | Stop reason: HOSPADM

## 2022-06-06 RX ADMIN — LEVOTHYROXINE SODIUM 150 MCG: 100 TABLET ORAL at 06:15

## 2022-06-06 RX ADMIN — CALCIUM CARBONATE 1250 MG: 1250 SUSPENSION ORAL at 20:37

## 2022-06-06 RX ADMIN — POTASSIUM & SODIUM PHOSPHATES POWDER PACK 280-160-250 MG 1 PACKET: 280-160-250 PACK at 22:26

## 2022-06-06 RX ADMIN — ALBUTEROL SULFATE 2.5 MG: 2.5 SOLUTION RESPIRATORY (INHALATION) at 07:38

## 2022-06-06 RX ADMIN — ALBUTEROL SULFATE 2.5 MG: 2.5 SOLUTION RESPIRATORY (INHALATION) at 19:03

## 2022-06-06 RX ADMIN — BRIVARACETAM 100 MG: 10 SOLUTION ORAL at 20:37

## 2022-06-06 RX ADMIN — CALCIUM CARBONATE 1250 MG: 1250 SUSPENSION ORAL at 13:09

## 2022-06-06 RX ADMIN — POTASSIUM & SODIUM PHOSPHATES POWDER PACK 280-160-250 MG 1 PACKET: 280-160-250 PACK at 08:16

## 2022-06-06 RX ADMIN — ALBUTEROL SULFATE 2.5 MG: 2.5 SOLUTION RESPIRATORY (INHALATION) at 15:15

## 2022-06-06 RX ADMIN — HYDROCORTISONE 15 MG: 10 TABLET ORAL at 08:17

## 2022-06-06 RX ADMIN — ACETYLCYSTEINE 2 ML: 200 SOLUTION ORAL; RESPIRATORY (INHALATION) at 07:38

## 2022-06-06 RX ADMIN — ALBUTEROL SULFATE 2.5 MG: 2.5 SOLUTION RESPIRATORY (INHALATION) at 22:48

## 2022-06-06 RX ADMIN — ACETYLCYSTEINE 2 ML: 200 SOLUTION ORAL; RESPIRATORY (INHALATION) at 15:15

## 2022-06-06 RX ADMIN — Medication 1 PACKET: at 08:41

## 2022-06-06 RX ADMIN — LIDOCAINE HYDROCHLORIDE 1 ML: 10 INJECTION, SOLUTION EPIDURAL; INFILTRATION; INTRACAUDAL; PERINEURAL at 12:54

## 2022-06-06 RX ADMIN — SODIUM CHLORIDE: 9 INJECTION, SOLUTION INTRAVENOUS at 17:01

## 2022-06-06 RX ADMIN — Medication 1 PACKET: at 16:59

## 2022-06-06 RX ADMIN — CARBAMAZEPINE 150 MG: 100 SUSPENSION ORAL at 01:31

## 2022-06-06 RX ADMIN — ALBUTEROL SULFATE 2.5 MG: 2.5 SOLUTION RESPIRATORY (INHALATION) at 11:16

## 2022-06-06 RX ADMIN — CEFTRIAXONE SODIUM 2 G: 2 INJECTION, POWDER, FOR SOLUTION INTRAMUSCULAR; INTRAVENOUS at 17:01

## 2022-06-06 RX ADMIN — ACETYLCYSTEINE 2 ML: 200 SOLUTION ORAL; RESPIRATORY (INHALATION) at 11:16

## 2022-06-06 RX ADMIN — CALCIUM CARBONATE 1250 MG: 1250 SUSPENSION ORAL at 08:19

## 2022-06-06 RX ADMIN — Medication 40 MG: at 08:19

## 2022-06-06 RX ADMIN — BRIVARACETAM 100 MG: 10 SOLUTION ORAL at 08:19

## 2022-06-06 RX ADMIN — MICONAZOLE NITRATE: 20 POWDER TOPICAL at 20:37

## 2022-06-06 RX ADMIN — CARBAMAZEPINE 150 MG: 100 SUSPENSION ORAL at 06:15

## 2022-06-06 RX ADMIN — ACETYLCYSTEINE 2 ML: 200 SOLUTION ORAL; RESPIRATORY (INHALATION) at 19:03

## 2022-06-06 RX ADMIN — OXYCODONE HYDROCHLORIDE AND ACETAMINOPHEN 1000 MG: 500 TABLET ORAL at 08:16

## 2022-06-06 RX ADMIN — MICONAZOLE NITRATE: 20 POWDER TOPICAL at 08:20

## 2022-06-06 RX ADMIN — Medication 15 ML: at 08:20

## 2022-06-06 RX ADMIN — ASPIRIN 81 MG CHEWABLE TABLET 81 MG: 81 TABLET CHEWABLE at 08:16

## 2022-06-06 RX ADMIN — CARBAMAZEPINE 100 MG: 100 SUSPENSION ORAL at 17:05

## 2022-06-06 RX ADMIN — Medication 50 MCG: at 08:16

## 2022-06-06 RX ADMIN — CARBAMAZEPINE 150 MG: 100 SUSPENSION ORAL at 13:09

## 2022-06-06 RX ADMIN — POTASSIUM & SODIUM PHOSPHATES POWDER PACK 280-160-250 MG 1 PACKET: 280-160-250 PACK at 20:36

## 2022-06-06 RX ADMIN — HYDROCORTISONE 5 MG: 5 TABLET ORAL at 13:08

## 2022-06-06 RX ADMIN — POTASSIUM & SODIUM PHOSPHATES POWDER PACK 280-160-250 MG 1 PACKET: 280-160-250 PACK at 17:02

## 2022-06-06 RX ADMIN — POTASSIUM & SODIUM PHOSPHATES POWDER PACK 280-160-250 MG 1 PACKET: 280-160-250 PACK at 13:08

## 2022-06-06 ASSESSMENT — ACTIVITIES OF DAILY LIVING (ADL)
ADLS_ACUITY_SCORE: 59
ADLS_ACUITY_SCORE: 55
ADLS_ACUITY_SCORE: 59
ADLS_ACUITY_SCORE: 55

## 2022-06-06 NOTE — PROGRESS NOTES
DATE & TIME: 06/05/2022 Night                    Cognitive Concerns/ Orientation : Pt non-verbal, only vocalizations, Unable to assess; does recognize some staff members from previous admissions; able to shake head no/nod yes and give thumbs up; Hx of TBI   BEHAVIOR & AGGRESSION TOOL COLOR: Green  ABNL VS/O2: VSS, room air  MOBILITY: Total cares, lift; Turn/repo every 2 hours; R side hemiplegia  PAIN MANAGMENT: Denies pain  DIET: NPO; Continuous tube feed at goal rate of 60 mL/hours with 60 mL free water flushes every 4 hours; Flush 15-30 mL before/after med administration  BOWEL/BLADDER: Incontinent of bowel and bladder, no BM this shift  ABNL LAB/BG: ; 99 (1 unit given)  DRAIN/DEVICES: L hand PIV saline locked  SKIN: Pale; scattered bruising; blanchable redness to coccyx-mepilex; generalized edema +1/+2; redness/rash to groin--miconazole applied  TESTS/PROCEDURES: n/a  D/C DATE: Pending, to home  OTHER IMPORTANT INFO: Meds given crushed through gastric tube; Contact precautions maintained

## 2022-06-06 NOTE — PROGRESS NOTES
[facility]    Medicine Progress Note - Hospitalist Service    Date of Admission:  5/26/2022    Assessment & Plan        Keyon Farias is a 61 year old male with history including TBI with aphasia, R sided spastic hemiplegia and dysphagia with GJ-tube for TFs/meds; seizure disorder; panhypopituitarism; h/o multiple hospital admissions for recurrent pneumonia (last in 12/2021); who presented 5/26/2022 with fever and found with evidence of UTI with sepsis.    On 5/27/2022, RRT called and pt found to be in septic shock and transferred to the ICU and was intubated and started on pressors. BC's subsequently positive for Strep salivarius group, UC only with 50-100K mixed urogenital jaime. Able to be weaned off pressors 5/30/2022. Extubated 5/30/2022, transferred out of the ICU 6/1/2022.     Strep salivarius group bacteremia, unclear source.  UTI.  Septic shock, suspect due to above.  Acute hypoxic respiratory failure, possibly related to septic shock, resolved.  Lactic acidosis due to sepsis, resolved.  History of recurrent aspiration pneumonia with acute hypoxic respiratory failure and sepsis.  * H/o multiple hospitalizations for aspiration pneumonia; however, last in 12/2021. Has chronic GJ-tube.  * On admit 5/26: WBC 19.5, lactate 6.9, UA showed 180 WBC, lg LE, positive nitrites. Started on pip-tazo and vanco on admit 5/26. BC 5/26 subsequently positive for Strep salivarius group.   UC 5/26 subsequently grew 50-100K mixed urogenital jaime.   * 5/27: RRT called and transferred to ICU for septic shock and required pressors. CT CAP 5/27 showed possible basilar infiltrates vs. artifact of breathing motion at the lung bases; no definite cause for fever and abdominal pain demonstrated in the abdomen or pelvis; stable cystic lesion in the pancreas. Pip-tazo stopped and started on meropenem 5/27. BC's 5/27 NGTD.  * 5/29: Antibiotics changed to ceftriaxone.  * 5/30: Pressors stopped and extubated.  *  "6/1: Off O2.  *6/3: Appreciate OMFS consult - no need for teeth extraction. Continue oral cares. Per ID, patient will need 4 weeks of IV ceftriaxone. Midline placement today.                  Recent Labs   Lab 06/02/22  0750 05/29/22  0412 05/28/22  1157 05/28/22  0437 05/27/22  2354 05/27/22  1832 05/27/22  1411 05/27/22  0835 05/27/22  0650 05/26/22  2337 05/26/22  1523 05/26/22  1519   WBC 8.8 10.2  --  20.2*  --   --  30.7*  --  28.5*  --  19.5*  --    LACT  --   --  3.8*  --  6.6* 6.8* 6.9* 0.7  --  1.8  --  1.4   - Continue ceftriaxone (started 5/29) - plan 2 weeks antibiotics for now (stop 6/9).  -Appreciate ID recommendations (echocardiogram, OMFS consultation) regarding strep salivarius group bacteremia,  - Continue PTA acetylcysteine + albuterol nebs and PRN nebs.  - Continue IS, pulmonary toilet.  - Monitor cultures.  -Echo as below, no evidence of vegetations, LVEF 55-60%  -OMFS recommendations following dental/facial bone CT: input appreciated; no need for dental extractions at this time; \"more likely source is mucosa of oral cavity or upper airway, as this is where s. salivarius is commonly found\".  -- continue ceftriaxone per ID recs.     Hyponatremia  -- Na 128 today, 142 two days ago  -- etiology unclear  -- will start on fluids for now  --monitor sodium daily     Volume overload, suspect from fluid resuscitation.  * Given IV furosemide x1 for crackles/chest congestion on 6/1 with good response.  - Monitor clinically for need for further diuretics holding off in the absence of any O2 requirement.  - Monitor i/o's, daily wts.     Anemia, suspect dilutional component.  * Hgb normal on admit. No overt clinical signs of major bleeding.  Recent Labs   Lab 06/02/22  0750 05/29/22  0412 05/28/22  0437 05/27/22  1411 05/27/22  0650 05/26/22  1523   HGB 10.5* 10.6* 11.2* 11.6* 14.0 13.8   - Monitor CBC.  - Consider prbc transfusion if hgb </= 7.0 or if significant bleeding with hemodynamic instability or if " symptomatic.     Thrombocytopenia, improving, unclear etiology, possibly secondary to bacteremia and/or medication effect or chronic from other cause.  * Plts normal on admit.  * Plts down to 144K on 5/28.           Recent Labs   Lab 06/02/22  0750 05/29/22  0412 05/28/22  0437 05/27/22  1411 05/27/22  0650 05/26/22  1523    143* 144* 168 161 186   - Monitor CBC.  - Consider platelet transfusion if needs a procedure and less than 50,000; or if less than 10,000.     TBI with aphasia, dysphagia, and R sided spastic hemiplegia.   Dysphagia with GJ-tube for TFs.  * Patient's mother is his caregiver, along with home care attendants. Has had multiple hospitalizations for recurrent pneumonias, but last in 12/2021.  - Continue TF's.  - holding PTA scheduled metoclopramide in setting of diarrhea.  - Resume PRN scopolamine at discharge.  - Continue PT, OT.     Seizure disorder.  Secondary to TBI. Chronic and stable on PTA AEDs.  - Continue brivaracetam, carbamazepine.     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  * Started on stress dose of IV hydrocortisone in the ICU.  -Status post stress steroids while in septic shock, now quickly tapered down and  back to normal PTA PO dosing (15 mg qam and 5 mg qpm at 2 pm) resumed on 6/2.  - Continue levothyroxine.  - Continue testosterone IM weekly.  -He remains euglycemic on 6/3/2022 discontinue glycemic checks  -Place patient out of bed     GERD.  - Continue pantoprazole.     Diarrhea requiring fecal management system, has been on antibiotics since day of admission, afebrile, normal WBCs, no abdominal pain on exam.  - Holding Reglan as above  - If spikes fever or has abdominal pain would recommend checking C. 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  History of DVT.  - Noted.  -Currently hemodynamically stable so we will discontinue telemetry monitoring           Clinically Significant Risk Factors Present on Admission                  COVID-19 testing.                COVID-19 PCR Results    COVID-19 PCR Results 8/9/21 8/24/21 9/30/21 10/28/21 11/12/21 11/24/21 12/5/21 12/27/21 5/9/22 5/26/22   COVID-19 Virus PCR to [facility] - Result                       COVID-19 Virus PCR to [facility] - Source                       SARS-CoV-2 Virus Specimen Source                       Flu A/B & SARS-COV-2 PCR Source                       SARS-CoV-2 PCR Result                       SARS CoV2 PCR Negative Negative Negative Negative Negative Negative Negative Negative Negative Negative       Comments are available for some flowsheets but are not being displayed.           COVID-19 Antibody Results, Testing for Immunity    COVID-19 Antibody Results, Testing for Immunity   No data to display.                    Diet: NPO for Medical/Clinical Reasons Except for: No Exceptions  Adult Formula Drip Feeding: Continuous Jevity 1.5; Jejunostomy; Goal Rate: 60; mL/hr; Medication - Feeding Tube Flush Frequency: At least 15-30 mL water before and after medication administration and with tube clogging; Amount to Send (Nutrition u...  Diet    DVT Prophylaxis: Pneumatic Compression Devices  Lerma Catheter: Not present  Central Lines: PRESENT       Cardiac Monitoring: None  Code Status: Full Code      Disposition Plan   Expected Discharge: 06/06/2022     Anticipated discharge location: home with help/services;home with family    Delays:          The patient's care was discussed with the Patient's Family.    Deloris Moffett MD  Hospitalist Service  [facility]  Securely message with the Vocera Web Console (learn more here)  Text page via [employer] Paging/Directory         Clinically Significant Risk Factors Present on Admission                    ______________________________________________________________________    Interval History   No complaints  Not in distress  Discharge held today due to acute hyponatremia although not severe.   Pending safe discharge planning.    Data reviewed today: I  reviewed all medications, new labs and imaging results over the last 24 hours. I personally reviewed no images or EKG's today.    Physical Exam   Vital Signs: Temp: 97.9  F (36.6  C) Temp src: Axillary BP: 112/59 Pulse: 66   Resp: 16 SpO2: 96 % O2 Device: None (Room air)    Weight: 162 lbs 14.4 oz  General Appearance: Well appearing for stated age.  Respiratory: CTAB, no rales or ronchi  Cardiovascular: S1, S2 normal, no murmurs  GI: non-tender on palpation, BS present      Data   Recent Labs   Lab 06/06/22  1313 06/06/22  0811 06/06/22  0758 06/05/22  1149 06/05/22  1103 06/04/22  1143 06/04/22  0901 06/02/22  0830 06/02/22  0750 06/01/22  0812 06/01/22  0335   WBC  --   --   --   --   --   --   --   --  8.8  --   --    HGB  --   --   --   --   --   --   --   --  10.5*  --   --    MCV  --   --   --   --   --   --   --   --  93  --   --    PLT  --   --   --   --   --   --   --   --  165  --   --    NA  --   --  128*  --   --   --   --   --  142  --  142   POTASSIUM  --   --  4.2  --  4.4  --  4.3   < > 4.1  --  3.8   CHLORIDE  --   --  96  --   --   --   --   --  111*  --   --    CO2  --   --  25  --   --   --   --   --  28  --   --    BUN  --   --  20  --   --   --   --   --  21  --   --    CR  --   --  0.76  --   --   --   --   --  0.64*  --  0.71   ANIONGAP  --   --  7  --   --   --   --   --  3  --   --    STEVE  --   --  7.9*  --   --   --   --   --  7.3*  --   --    * 118* 103*   < >  --    < >  --    < > 76   < >  --     < > = values in this interval not displayed.     No results found for this or any previous visit (from the past 24 hour(s)).

## 2022-06-06 NOTE — PROGRESS NOTES
[facility]    Received referral for IV abx. Keyon has coverage for IV ABX with their [place] Medicaid secondary plan at 100% with a monthly deductible of $3.55. Drug may have copay per dispense (typically between $0-$8).     [facility] is not able to bill his insurance for nursing; therefore, we will need to find a home care agency. Referrals were sent out over the weekend; however, since home care agencies are closed over the weekend, they will begin to review the referral this morning. [facility] is required to have a home care agency secured before we are able to accept pt onto services.    I spoke with Keyon's mom, Savannah to introduce home infusion services, review benefits and offer choice of providers. She would like to proceed with [facility] for home IV abx needs. She is aware that we need to have a home care agency secured prior to discharge. On the day of discharge and after pt has his IV line, I will coordinate IV education at bedside with pt's home.     Addendum @ 1500h: [facility] will follow for home RN. I spoke with pt's mom who would like RN visit in the home tomorrow for IV education and first home dose. Pt will dose ceftriaxone prior to discharge home. I will update pt's mom on medication delivery time and RN visit time as soon as they are known.     Thank you for the referral.    Heather Crandall RN  [facility] Liaison  [phone] (Mon thru Fri 8am - 5pm)  [phone] Office

## 2022-06-06 NOTE — PROGRESS NOTES
Care Management Follow Up    Length of Stay (days): 11    Expected Discharge Date: 06/07/2022     Concerns to be Addressed:    Referral was sent to [facility]  Patient plan of care discussed at interdisciplinary rounds: Yes    Anticipated Discharge Disposition: Home Infusion//Home care RN     Anticipated Discharge Services: Transportation Services  Anticipated Discharge DME:  (Pt's mother is renting an electric nidia)    Patient/family educated on Medicare website which has current facility and service quality ratings:  NA  Education Provided on the Discharge Plan:  yes  Patient/Family in Agreement with the Plan: yes     Referrals Placed by CM/SW: Scheduled Follow-up appointments  Private pay costs discussed: Not applicable    Additional Information:  Hoping to discharge home once we have confirmation of accepting home care agency.  Pt will be getting a midline in today.  He will need stretcher transport set-up once we have a home care agency.    Hospital follow-up appointment scheduled at [facility] on 6/8/22 with Dr Queen.  There is also a new PCP appointment scheduled on 6/25/22 with Dr Niko Manning.  Pt is not able to get to the clinic, so appointment has been scheduled virtually.      Karen Collins, RN   Inpatient Care Management  [phone]

## 2022-06-06 NOTE — PLAN OF CARE
Goal Outcome Evaluation:    DATE & TIME: 06/05/2022 7862-7518        Cognitive Concerns/ Orientation : Alert. HECTOR orientation. Pt nonverbal, makes noises at times. Smiles and gives thumbs up. Hx of TBI.   BEHAVIOR & AGGRESSION TOOL COLOR: Green  ABNL VS/O2: VSS on RA  MOBILITY: Total cares, lift; Turn/repo every 2 hours; R side hemiplegia.   PAIN MANAGMENT: no nonverbal indicators of pain present  DIET: NPO; Continuous tube feed at goal rate of 60 mL/hours with 60 mL free water flushes every 4 hours.   BOWEL/BLADDER: Incontinent of bowel and bladder; External catheter in place. A lot of output. NO BM this shift  ABNL LAB/BG: /122  DRAIN/DEVICES: PIV SL  SKIN: Pale; scattered bruising/abrasions; blanchable redness/moisture breakdown to coccyx-mepilex changed; Generalized edema +1/+2, mainly to bilateral arms. Severe moisture rash to groin, anti fungal powder applied.   TESTS/PROCEDURES: N/A  D/C DATE: Pending, mother requesting update from MD when pt is leaving. She wants to bring him home. MD previously paged - no updated information provided  OTHER IMPORTANT INFO: Contact precautions maintained.

## 2022-06-06 NOTE — PROGRESS NOTES
Care Management Discharge Note    Discharge Date: 06/07/2022       Discharge Disposition: Home     Discharge Services: Home Infusion, Transportation Services    Discharge DME:  (Pt's mother is renting an electric nidia)    Discharge Transportation: health plan transportation    Private pay costs discussed: Not applicable    PAS Confirmation Code:  NA  Patient/family educated on Medicare website which has current facility and service quality ratings:  NA    Education Provided on the Discharge Plan: yes  Persons Notified of Discharge Plans: Pt, his Mother/Guardian SavannahJoaquin  Patient/Family in Agreement with the Plan: yes    Handoff Referral Completed: Yes    Additional Information:  Received call from Heather Crandall RN Clinical Liaison from [facility] ([facility]).  [facility] has secured a home care agency [facility] to provide home Nsg.  Pt is discharging this afternoon.  Transportation has been arranged through [employer] via stretcher at 6:30 PM.  Heather will call pt's mother to go over product delivery and when they will plan to be at their home for the first infusion and teaching.  PCP appt. Is scheduled for 6/8/22.  Nsg will need to call pt's mother with the discharge instructions.    Addendum:  Pt's discharge was cancelled for today due to sodium drop from 140's to 128.  Fluids tonight with recheck early AM.  Transportation was rescheduled with [employer] via stretcher at 12 noon tomorrow.      Karen Collins RN   Inpatient Care Management  [phone]

## 2022-06-06 NOTE — PROCEDURES
[facility]    Single Lumen Midline Placement    Date/Time: 6/6/2022 1:19 PM  Performed by: Hawa Godinez RN  Authorized by: Senait Orona MD   Indications: vascular access      UNIVERSAL PROTOCOL   Site Marked: Yes  Prior Images Obtained and Reviewed:  Yes  Required items: Required blood products, implants, devices and special equipment available    Patient identity confirmed:  Arm band and hospital-assigned identification number  NA - No sedation, light sedation, or local anesthesia  Confirmation Checklist:  Patient's identity using two indicators, correct equipment/implants were available, relevant allergies and procedure was appropriate and matched the consent or emergent situation  Time out: Immediately prior to the procedure a time out was called    Universal Protocol: the Joint Commission Universal Protocol was followed    Preparation: Patient was prepped and draped in usual sterile fashion    ESBL (mL):  1     ANESTHESIA    Local Anesthetic: Lidocaine 1% without epinephrine  Anesthetic Total (mL):  1      SEDATION    Patient Sedated: No        Preparation: skin prepped with ChloraPrep  Skin prep agent: skin prep agent completely dried prior to procedure  Sterile barriers: maximum sterile barriers were used: cap, mask, sterile gown, sterile gloves, and large sterile sheet  Hand hygiene: hand hygiene performed prior to central venous catheter insertion  Type of line used: Midline  Catheter type: single lumen  Lumen type: non-valved  Catheter size: 4 Fr  Brand: Bard  Placement method: ultrasound  Number of attempts: 1  Difficulty threading catheter: no  Successful placement: yes  Orientation: left    Location: brachial vein (medial)  Arm circumference: adults 10 cm  Extremity circumference: 32  Visible catheter length: 2  Internal length: 15 cm  Total catheter length: 17  Dressing and securement: adhesive securement device, chlorhexidine disc applied and occlusive dressing  applied  Post procedure assessment: blood return through all ports  PROCEDURE   Patient Tolerance:  Patient tolerated the procedure well with no immediate complications   Midline placed without difficulty/midline ok to use

## 2022-06-06 NOTE — PLAN OF CARE
DATE & TIME: 06/06/2022 7-3 pm                   Cognitive Concerns/ Orientation : Alert. HECTOR orientation. Pt nonverbal, makes noises at times. Smiles and gives thumbs up. Hx of TBI.   BEHAVIOR & AGGRESSION TOOL COLOR: Green  ABNL VS/O2: VSS on RA  MOBILITY: Total cares, lift; Turn/repo every 2 hours; R side hemiplegia  PAIN MANAGMENT: Denies pain when asked.   DIET: NPO; Continuous tube feed at goal rate of 60 mL/hours with 60 mL free water flushes every 4 hours.   BOWEL/BLADDER: Incontinent of bowel and bladder. No tolerating external catheter, removed. No BM since yesterday.   ABNL LAB/BG: /122. Sodium 128, Calcium 7.9, Phos 2.4 (replacement ordered).  DRAIN/DEVICES: PIV SL and Midline SL  SKIN: Pale; scattered bruising; blanchable redness to coccyx-mepilex; CDI. Upper extremity edema +1/+2; redness/rash to groin--miconazole applied.  TESTS/PROCEDURES: None ordered.  D/C DATE: Pending, to home tomorrow.   OTHER IMPORTANT INFO: Contact precautions maintained. No changes. Sodium dropped. Needs to stay another night.

## 2022-06-07 LAB
GLUCOSE BLDC GLUCOMTR-MCNC: 104 MG/DL (ref 70–99)
GLUCOSE BLDC GLUCOMTR-MCNC: 115 MG/DL (ref 70–99)
GLUCOSE BLDC GLUCOMTR-MCNC: 121 MG/DL (ref 70–99)
GLUCOSE BLDC GLUCOMTR-MCNC: 123 MG/DL (ref 70–99)
GLUCOSE BLDC GLUCOMTR-MCNC: 128 MG/DL (ref 70–99)
GLUCOSE BLDC GLUCOMTR-MCNC: 128 MG/DL (ref 70–99)
SODIUM SERPL-SCNC: 129 MMOL/L (ref 133–144)

## 2022-06-07 PROCEDURE — 999N000157 HC STATISTIC RCP TIME EA 10 MIN: Mod: 76

## 2022-06-07 PROCEDURE — 250N000009 HC RX 250: Performed by: INTERNAL MEDICINE

## 2022-06-07 PROCEDURE — 99232 SBSQ HOSP IP/OBS MODERATE 35: CPT | Performed by: HOSPITALIST

## 2022-06-07 PROCEDURE — 250N000013 HC RX MED GY IP 250 OP 250 PS 637: Performed by: HOSPITALIST

## 2022-06-07 PROCEDURE — 250N000013 HC RX MED GY IP 250 OP 250 PS 637: Performed by: INTERNAL MEDICINE

## 2022-06-07 PROCEDURE — 250N000013 HC RX MED GY IP 250 OP 250 PS 637

## 2022-06-07 PROCEDURE — 94640 AIRWAY INHALATION TREATMENT: CPT

## 2022-06-07 PROCEDURE — 250N000011 HC RX IP 250 OP 636: Performed by: HOSPITALIST

## 2022-06-07 PROCEDURE — 94640 AIRWAY INHALATION TREATMENT: CPT | Mod: 76

## 2022-06-07 PROCEDURE — 999N000190 HC STATISTIC VAT ROUNDS

## 2022-06-07 PROCEDURE — 250N000011 HC RX IP 250 OP 636: Performed by: INTERNAL MEDICINE

## 2022-06-07 PROCEDURE — 120N000001 HC R&B MED SURG/OB

## 2022-06-07 PROCEDURE — 258N000003 HC RX IP 258 OP 636: Performed by: HOSPITALIST

## 2022-06-07 PROCEDURE — 84295 ASSAY OF SERUM SODIUM: CPT | Performed by: HOSPITALIST

## 2022-06-07 PROCEDURE — 94642 AEROSOL INHALATION TREATMENT: CPT

## 2022-06-07 RX ORDER — TESTOSTERONE CYPIONATE 200 MG/ML
60 INJECTION, SOLUTION INTRAMUSCULAR ONCE
Status: COMPLETED | OUTPATIENT
Start: 2022-06-07 | End: 2022-06-07

## 2022-06-07 RX ADMIN — ACETYLCYSTEINE 2 ML: 200 SOLUTION ORAL; RESPIRATORY (INHALATION) at 19:59

## 2022-06-07 RX ADMIN — ALBUTEROL SULFATE 2.5 MG: 2.5 SOLUTION RESPIRATORY (INHALATION) at 19:59

## 2022-06-07 RX ADMIN — SODIUM CHLORIDE: 9 INJECTION, SOLUTION INTRAVENOUS at 17:02

## 2022-06-07 RX ADMIN — Medication 1 PACKET: at 08:39

## 2022-06-07 RX ADMIN — POTASSIUM & SODIUM PHOSPHATES POWDER PACK 280-160-250 MG 1 PACKET: 280-160-250 PACK at 08:36

## 2022-06-07 RX ADMIN — Medication 15 ML: at 08:38

## 2022-06-07 RX ADMIN — Medication 40 MG: at 08:38

## 2022-06-07 RX ADMIN — POTASSIUM & SODIUM PHOSPHATES POWDER PACK 280-160-250 MG 1 PACKET: 280-160-250 PACK at 20:26

## 2022-06-07 RX ADMIN — HYDROCORTISONE 5 MG: 5 TABLET ORAL at 13:55

## 2022-06-07 RX ADMIN — CARBAMAZEPINE 150 MG: 100 SUSPENSION ORAL at 11:29

## 2022-06-07 RX ADMIN — ALBUTEROL SULFATE 2.5 MG: 2.5 SOLUTION RESPIRATORY (INHALATION) at 16:07

## 2022-06-07 RX ADMIN — Medication 1 PACKET: at 17:08

## 2022-06-07 RX ADMIN — CEFTRIAXONE SODIUM 2 G: 2 INJECTION, POWDER, FOR SOLUTION INTRAMUSCULAR; INTRAVENOUS at 17:04

## 2022-06-07 RX ADMIN — BRIVARACETAM 100 MG: 10 SOLUTION ORAL at 08:37

## 2022-06-07 RX ADMIN — MICONAZOLE NITRATE: 20 POWDER TOPICAL at 20:27

## 2022-06-07 RX ADMIN — CALCIUM CARBONATE 1250 MG: 1250 SUSPENSION ORAL at 20:26

## 2022-06-07 RX ADMIN — Medication 50 MCG: at 08:37

## 2022-06-07 RX ADMIN — ACETYLCYSTEINE 2 ML: 200 SOLUTION ORAL; RESPIRATORY (INHALATION) at 07:14

## 2022-06-07 RX ADMIN — LEVOTHYROXINE SODIUM 150 MCG: 100 TABLET ORAL at 06:46

## 2022-06-07 RX ADMIN — TESTOSTERONE CYPIONATE 60 MG: 200 INJECTION, SOLUTION INTRAMUSCULAR at 11:47

## 2022-06-07 RX ADMIN — ALBUTEROL SULFATE 2.5 MG: 2.5 SOLUTION RESPIRATORY (INHALATION) at 11:20

## 2022-06-07 RX ADMIN — ACETYLCYSTEINE 2 ML: 200 SOLUTION ORAL; RESPIRATORY (INHALATION) at 16:07

## 2022-06-07 RX ADMIN — CARBAMAZEPINE 150 MG: 100 SUSPENSION ORAL at 06:45

## 2022-06-07 RX ADMIN — ASPIRIN 81 MG CHEWABLE TABLET 81 MG: 81 TABLET CHEWABLE at 08:37

## 2022-06-07 RX ADMIN — ACETYLCYSTEINE 2 ML: 200 SOLUTION ORAL; RESPIRATORY (INHALATION) at 11:30

## 2022-06-07 RX ADMIN — CARBAMAZEPINE 100 MG: 100 SUSPENSION ORAL at 17:07

## 2022-06-07 RX ADMIN — SODIUM CHLORIDE: 9 INJECTION, SOLUTION INTRAVENOUS at 04:50

## 2022-06-07 RX ADMIN — CALCIUM CARBONATE 1250 MG: 1250 SUSPENSION ORAL at 13:55

## 2022-06-07 RX ADMIN — POTASSIUM & SODIUM PHOSPHATES POWDER PACK 280-160-250 MG 1 PACKET: 280-160-250 PACK at 13:55

## 2022-06-07 RX ADMIN — ALBUTEROL SULFATE 2.5 MG: 2.5 SOLUTION RESPIRATORY (INHALATION) at 07:21

## 2022-06-07 RX ADMIN — MICONAZOLE NITRATE: 20 POWDER TOPICAL at 08:39

## 2022-06-07 RX ADMIN — BRIVARACETAM 100 MG: 10 SOLUTION ORAL at 20:26

## 2022-06-07 RX ADMIN — CALCIUM CARBONATE 1250 MG: 1250 SUSPENSION ORAL at 08:38

## 2022-06-07 RX ADMIN — CARBAMAZEPINE 150 MG: 100 SUSPENSION ORAL at 00:38

## 2022-06-07 RX ADMIN — HYDROCORTISONE 15 MG: 10 TABLET ORAL at 08:37

## 2022-06-07 RX ADMIN — OXYCODONE HYDROCHLORIDE AND ACETAMINOPHEN 1000 MG: 500 TABLET ORAL at 08:37

## 2022-06-07 ASSESSMENT — ACTIVITIES OF DAILY LIVING (ADL)
ADLS_ACUITY_SCORE: 59
ADLS_ACUITY_SCORE: 59
ADLS_ACUITY_SCORE: 55
ADLS_ACUITY_SCORE: 59
ADLS_ACUITY_SCORE: 59
ADLS_ACUITY_SCORE: 55
ADLS_ACUITY_SCORE: 59
ADLS_ACUITY_SCORE: 55
ADLS_ACUITY_SCORE: 59
ADLS_ACUITY_SCORE: 59

## 2022-06-07 NOTE — PROGRESS NOTES
[facility]  Patient is currently open to home care services with [facility]. The patient is currently receiving Skilled Nursing services.  and home health team have been notified of patient admission. [facility] liaison will continue to follow patient during stay. If appropriate provide orders to resume home care at time of discharge.    Addendum: [facility] team is aware of plan for discharge with IV antibiotics and we are planning to resume home RN services. [facility] team has communicated with I team.

## 2022-06-07 NOTE — PROGRESS NOTES
[facility]    Medicine Progress Note - Hospitalist Service    Date of Admission:  5/26/2022    Assessment & Plan        Keyon Farias is a 61 year old male with history including TBI with aphasia, R sided spastic hemiplegia and dysphagia with GJ-tube for TFs/meds; seizure disorder; panhypopituitarism; h/o multiple hospital admissions for recurrent pneumonia (last in 12/2021); who presented 5/26/2022 with fever and found with evidence of UTI with sepsis.    On 5/27/2022, RRT called and pt found to be in septic shock and transferred to the ICU and was intubated and started on pressors. BC's subsequently positive for Strep salivarius group, UC only with 50-100K mixed urogenital jaime. Able to be weaned off pressors 5/30/2022. Extubated 5/30/2022, transferred out of the ICU 6/1/2022.     Strep salivarius group bacteremia, unclear source.  UTI.  Septic shock, suspect due to above.  Acute hypoxic respiratory failure, possibly related to septic shock, resolved.  Lactic acidosis due to sepsis, resolved.  History of recurrent aspiration pneumonia with acute hypoxic respiratory failure and sepsis.  * H/o multiple hospitalizations for aspiration pneumonia; however, last in 12/2021. Has chronic GJ-tube.  * On admit 5/26: WBC 19.5, lactate 6.9, UA showed 180 WBC, lg LE, positive nitrites. Started on pip-tazo and vanco on admit 5/26. BC 5/26 subsequently positive for Strep salivarius group.   UC 5/26 subsequently grew 50-100K mixed urogenital jaime.   * 5/27: RRT called and transferred to ICU for septic shock and required pressors. CT CAP 5/27 showed possible basilar infiltrates vs. artifact of breathing motion at the lung bases; no definite cause for fever and abdominal pain demonstrated in the abdomen or pelvis; stable cystic lesion in the pancreas. Pip-tazo stopped and started on meropenem 5/27. BC's 5/27 NGTD.  * 5/29: Antibiotics changed to ceftriaxone.  * 5/30: Pressors stopped and extubated.  *  "6/1: Off O2.  *6/3: Appreciate OMFS consult - no need for teeth extraction. Continue oral cares. Per ID, patient will need 4 weeks of IV ceftriaxone. Midline placement today.                  Recent Labs   Lab 06/02/22  0750 05/29/22  0412 05/28/22  1157 05/28/22  0437 05/27/22  2354 05/27/22  1832 05/27/22  1411 05/27/22  0835 05/27/22  0650 05/26/22  2337 05/26/22  1523 05/26/22  1519   WBC 8.8 10.2  --  20.2*  --   --  30.7*  --  28.5*  --  19.5*  --    LACT  --   --  3.8*  --  6.6* 6.8* 6.9* 0.7  --  1.8  --  1.4   - Continue ceftriaxone (started 5/29) - plan 2 weeks antibiotics for now (stop 6/9).  -Appreciate ID recommendations (echocardiogram, OMFS consultation) regarding strep salivarius group bacteremia,  - Continue PTA acetylcysteine + albuterol nebs and PRN nebs.  - Continue IS, pulmonary toilet.  - Monitor cultures.  -Echo as below, no evidence of vegetations, LVEF 55-60%  -OMFS recommendations following dental/facial bone CT: input appreciated; no need for dental extractions at this time; \"more likely source is mucosa of oral cavity or upper airway, as this is where s. salivarius is commonly found\".  -- continue ceftriaxone per ID recs.     Hyponatremia  -- Na 128 on 6/6/2022, 142 two days prior  --Sodium increased to 129 today.  -- etiology unclear  -- will increase IV fluid rate today.  Speak with nutritionist about decreasing free water flushes.  --monitor sodium daily     Volume overload, suspect from fluid resuscitation.  * Given IV furosemide x1 for crackles/chest congestion on 6/1 with good response.  - Monitor clinically for need for further diuretics holding off in the absence of any O2 requirement.  - Monitor i/o's, daily wts.     Anemia, suspect dilutional component.  * Hgb normal on admit. No overt clinical signs of major bleeding.  Recent Labs   Lab 06/02/22  0750 05/29/22  0412 05/28/22  0437 05/27/22  1411 05/27/22  0650 05/26/22  1523   HGB 10.5* 10.6* 11.2* 11.6* 14.0 13.8   - Monitor " CBC.  - Consider prbc transfusion if hgb </= 7.0 or if significant bleeding with hemodynamic instability or if symptomatic.     Thrombocytopenia, improving, unclear etiology, possibly secondary to bacteremia and/or medication effect or chronic from other cause.  * Plts normal on admit.  * Plts down to 144K on 5/28.           Recent Labs   Lab 06/02/22  0750 05/29/22  0412 05/28/22  0437 05/27/22  1411 05/27/22  0650 05/26/22  1523    143* 144* 168 161 186   - Monitor CBC.  - Consider platelet transfusion if needs a procedure and less than 50,000; or if less than 10,000.     TBI with aphasia, dysphagia, and R sided spastic hemiplegia.   Dysphagia with GJ-tube for TFs.  * Patient's mother is his caregiver, along with home care attendants. Has had multiple hospitalizations for recurrent pneumonias, but last in 12/2021.  - Continue TF's.  - holding PTA scheduled metoclopramide in setting of diarrhea.  - Resume PRN scopolamine at discharge.  - Continue PT, OT.     Seizure disorder.  Secondary to TBI. Chronic and stable on PTA AEDs.  - Continue brivaracetam, carbamazepine.     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  * Started on stress dose of IV hydrocortisone in the ICU.  -Status post stress steroids while in septic shock, now quickly tapered down and  back to normal PTA PO dosing (15 mg qam and 5 mg qpm at 2 pm) resumed on 6/2.  - Continue levothyroxine.  - Continue testosterone IM weekly.  -He remains euglycemic on 6/3/2022 discontinue glycemic checks  -Place patient out of bed     GERD.  - Continue pantoprazole.     Diarrhea requiring fecal management system, has been on antibiotics since day of admission, afebrile, normal WBCs, no abdominal pain on exam.  - Holding Reglan as above  - If spikes fever or has abdominal pain would recommend checking C. 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  History of DVT.  - Noted.  -Currently hemodynamically stable so we will discontinue telemetry  monitoring           Clinically Significant Risk Factors Present on Admission                  COVID-19 testing.               COVID-19 PCR Results    COVID-19 PCR Results 8/9/21 8/24/21 9/30/21 10/28/21 11/12/21 11/24/21 12/5/21 12/27/21 5/9/22 5/26/22   COVID-19 Virus PCR to [facility] - Result                       COVID-19 Virus PCR to [facility] - Source                       SARS-CoV-2 Virus Specimen Source                       Flu A/B & SARS-COV-2 PCR Source                       SARS-CoV-2 PCR Result                       SARS CoV2 PCR Negative Negative Negative Negative Negative Negative Negative Negative Negative Negative       Comments are available for some flowsheets but are not being displayed.           COVID-19 Antibody Results, Testing for Immunity    COVID-19 Antibody Results, Testing for Immunity   No data to display.                    Diet: NPO for Medical/Clinical Reasons Except for: No Exceptions  Adult Formula Drip Feeding: Continuous Jevity 1.5; Jejunostomy; Goal Rate: 60; mL/hr; Medication - Feeding Tube Flush Frequency: At least 15-30 mL water before and after medication administration and with tube clogging; Amount to Send (Nutrition u...  Diet    DVT Prophylaxis: Pneumatic Compression Devices  Lerma Catheter: Not present  Central Lines: PRESENT       Cardiac Monitoring: None  Code Status: Full Code      Disposition Plan   Expected Discharge: 06/08/2022     Anticipated discharge location: home with help/services;home with family    Delays:          The patient's care was discussed with the Patient's Family.    Deloris Moffett MD  Hospitalist Service  [facility]  Securely message with the Vocera Web Console (learn more here)  Text page via [facility] Paging/Directory         Clinically Significant Risk Factors Present on Admission                    ______________________________________________________________________    Interval History   No complaints  Not in  distress  Discharge held today due to acute hyponatremia although not severe.   Pending safe discharge planning.    Data reviewed today: I reviewed all medications, new labs and imaging results over the last 24 hours. I personally reviewed no images or EKG's today.    Physical Exam   Vital Signs: Temp: 97.5  F (36.4  C) Temp src: Axillary BP: 112/55 Pulse: 75   Resp: 16 SpO2: 96 % O2 Device: None (Room air)    Weight: 156 lbs 8 oz  General Appearance: Well appearing for stated age.  Respiratory: CTAB, no rales or ronchi  Cardiovascular: S1, S2 normal, no murmurs  GI: non-tender on palpation, BS present      Data   Recent Labs   Lab 06/07/22  1142 06/07/22  0814 06/07/22  0658 06/07/22  0437 06/06/22  0811 06/06/22  0758 06/05/22  1149 06/05/22  1103 06/04/22  1143 06/04/22  0901 06/02/22  0830 06/02/22  0750 06/01/22  0812 06/01/22  0335   WBC  --   --   --   --   --   --   --   --   --   --   --  8.8  --   --    HGB  --   --   --   --   --   --   --   --   --   --   --  10.5*  --   --    MCV  --   --   --   --   --   --   --   --   --   --   --  93  --   --    PLT  --   --   --   --   --   --   --   --   --   --   --  165  --   --    NA  --   --  129*  --   --  128*  --   --   --   --   --  142  --  142   POTASSIUM  --   --   --   --   --  4.2  --  4.4  --  4.3   < > 4.1  --  3.8   CHLORIDE  --   --   --   --   --  96  --   --   --   --   --  111*  --   --    CO2  --   --   --   --   --  25  --   --   --   --   --  28  --   --    BUN  --   --   --   --   --  20  --   --   --   --   --  21  --   --    CR  --   --   --   --   --  0.76  --   --   --   --   --  0.64*  --  0.71   ANIONGAP  --   --   --   --   --  7  --   --   --   --   --  3  --   --    STEVE  --   --   --   --   --  7.9*  --   --   --   --   --  7.3*  --   --    * 115*  --  104*   < > 103*   < >  --    < >  --    < > 76   < >  --     < > = values in this interval not displayed.     No results found for this or any previous visit (from the past 24  hour(s)).

## 2022-06-07 NOTE — PLAN OF CARE
DATE & TIME: 06/06/2022 9751-7584  Cognitive Concerns/ Orientation : Alert. HECTOR orientation. Pt nonverbal, makes noises at times. Smiles and gives thumbs up. Hx of TBI.   BEHAVIOR & AGGRESSION TOOL COLOR: Green  ABNL VS/O2: VSS on RA  MOBILITY: Total cares, lift; Turn/repo every 2 hours; R side hemiplegia  PAIN MANAGMENT: Denies pain when asked.   DIET: NPO; Continuous tube feed at goal rate of 60 mL/hours with 60 mL free water flushes every 4 hours.   BOWEL/BLADDER: Incontinent of bowel and bladder. External catheter in place, No BM since yesterday.   ABNL LAB/BG: /139. Sodium 128, Calcium 7.9, Phos 2.4 (replacement ordered).  DRAIN/DEVICES: PIV SL and Midline running NS @ 75 ml/hr  SKIN: Pale; scattered bruising; blanchable redness to coccyx-mepilex; CDI. Upper extremity edema +1/+2; redness/rash to groin--miconazole applied.  TESTS/PROCEDURES: None ordered.  D/C DATE: Pending, discharge 6/7 at noon to home.   OTHER IMPORTANT INFO: Contact precautions maintained. No changes. Sodium dropped. Needs to stay another night. Refused multiple attempts to suction

## 2022-06-07 NOTE — PLAN OF CARE
DATE & TIME: 6/6/22, 2630 - 0754    Cognitive Concerns/ Orientation : HECTOR. Patient nonverbal but makes incomprehensible sounds at times. Will shake head for yes/no or give thumbs up   BEHAVIOR & AGGRESSION TOOL COLOR: Green  CIWA SCORE: NA   ABNL VS/O2: VSS on room air  MOBILITY: Total cares, lift. Turned and repositioned. Right side hemiplegia  PAIN MANAGMENT: Denied pain. Absence of nonverbal indicators  DIET: NPO, continuous tube feed at 60 ml.hr and free water flushes of 60 ml q4h  BOWEL/BLADDER: Incontinent of B/B. External catheter in place. No BM this shift  ABNL LAB/BG: /104.   DRAIN/DEVICES: Midline infusing at 75 ml/hr  TELEMETRY RHYTHM: NA  SKIN: Pale, scattered bruises. +1/+2 edema to upper extremities. Rash to groin. Mepilex to coccyx-blanchable redness   TESTS/PROCEDURES: NA  D/C DATE: For discharge home today 6/7/22 at noon  OTHER IMPORTANT INFO: Contact precautions maintained    Goal Outcome Evaluation:    Plan of Care Reviewed With: patient     Overall Patient Progress: improving

## 2022-06-07 NOTE — PROGRESS NOTES
Care Management Follow Up    Length of Stay (days): 12    Expected Discharge Date: 06/08/2022     Concerns to be Addressed:  Watch for sodium level  Patient plan of care discussed at interdisciplinary rounds: Yes    Anticipated Discharge Disposition: Home Infusion     Anticipated Discharge Services: Transportation Services  Anticipated Discharge DME:  (Pt's mother is renting an electric nidia)    Patient/family educated on Medicare website which has current facility and service quality ratings:  NA  Education Provided on the Discharge Plan: yes  Patient/Family in Agreement with the Plan: yes     Referrals Placed by CM/SW: Scheduled Follow-up appointments  Private pay costs discussed: Not applicable    Additional Information:  Discharge was cancelled again today due to sodium level 129 up from 128 yesterday.  Transportation has been rescheduled for 10:30 AM tomorrow.  Will need sodium level drawn early.  Nsg has called pt's mother.  [facility] was updated.  Will need to reschedule virtual appointment that was scheduled for tomorrow.      Karen Collins, RN   Inpatient Care Management  [phone]

## 2022-06-07 NOTE — PROGRESS NOTES
Na 129 (128 yesterday 6/6, discharge was cancelled yesterday d/t drop in Na level from 142 on 6/2/22 to 128 6/6/22). MD notified, ordered to cancel the discharge again today. Left a voicemail for pt's mother/guardian Savannah at [phone].

## 2022-06-07 NOTE — PLAN OF CARE
Goal Outcome Evaluation:    Plan of Care Reviewed With: patient, mother     DATE & TIME: 6/7/22 [id number]                           Cognitive Concerns/ Orientation : HECTOR. Aphasic at baseline, but pt makes incomprehensible sounds at times. Will shake head for yes/no appropriately.    BEHAVIOR & AGGRESSION TOOL COLOR: Green  CIWA SCORE: NA    ABNL VS/O2: VSS, O2 94% RA   MOBILITY: Total cares, lift. Turned and repositioned. Right side hemiplegia  PAIN MANAGMENT: Denied pain. Absence of nonverbal indicators of pain.   DIET: NPO, continuous tube feed at 60 ml.hr and free water flushes of 60 ml q4h  BOWEL/BLADDER: Incontinent of B/B. External catheter in place. No BM this shift  ABNL LAB/BG: , 123, Na 129 (128), Phosphorus 2.4 yesterday, on scheduled phosphorus TID.   DRAIN/DEVICES: Midline infusing  ml/hr, purewick in place with adequate UOP.   TELEMETRY RHYTHM: NA  SKIN: Pale, scattered bruises. +1/+2 edema to upper extremities. Rash to groin/memo area, applied miconazole powder, Mepilex to coccyx-blanchable redness   TESTS/PROCEDURES: NA  D/C DATE: pending improvement of Na level.   OTHER IMPORTANT INFO: Contact precautions maintained. On IV Rocephin q24h. IVF increased from 75 mL/hr to 100 mL/hr.

## 2022-06-08 ENCOUNTER — HOME INFUSION (PRE-WILLOW HOME INFUSION) (OUTPATIENT)
Dept: PHARMACY | Facility: CLINIC | Age: 62
End: 2022-06-08

## 2022-06-08 ENCOUNTER — APPOINTMENT (OUTPATIENT)
Dept: INTERVENTIONAL RADIOLOGY/VASCULAR | Facility: CLINIC | Age: 62
DRG: 871 | End: 2022-06-08
Attending: HOSPITALIST
Payer: MEDICARE

## 2022-06-08 ENCOUNTER — TELEPHONE (OUTPATIENT)
Dept: FAMILY MEDICINE | Facility: CLINIC | Age: 62
End: 2022-06-08

## 2022-06-08 VITALS
RESPIRATION RATE: 18 BRPM | DIASTOLIC BLOOD PRESSURE: 62 MMHG | WEIGHT: 156.5 LBS | HEART RATE: 57 BPM | SYSTOLIC BLOOD PRESSURE: 110 MMHG | OXYGEN SATURATION: 97 % | TEMPERATURE: 97.6 F | BODY MASS INDEX: 21.23 KG/M2

## 2022-06-08 LAB
GLUCOSE BLDC GLUCOMTR-MCNC: 111 MG/DL (ref 70–99)
GLUCOSE BLDC GLUCOMTR-MCNC: 118 MG/DL (ref 70–99)
GLUCOSE BLDC GLUCOMTR-MCNC: 119 MG/DL (ref 70–99)
GLUCOSE BLDC GLUCOMTR-MCNC: 81 MG/DL (ref 70–99)
PHOSPHATE SERPL-MCNC: 2.4 MG/DL (ref 2.5–4.5)
SODIUM SERPL-SCNC: 131 MMOL/L (ref 133–144)

## 2022-06-08 PROCEDURE — C1769 GUIDE WIRE: HCPCS

## 2022-06-08 PROCEDURE — 84295 ASSAY OF SERUM SODIUM: CPT | Performed by: HOSPITALIST

## 2022-06-08 PROCEDURE — 84100 ASSAY OF PHOSPHORUS: CPT | Performed by: HOSPITALIST

## 2022-06-08 PROCEDURE — 250N000013 HC RX MED GY IP 250 OP 250 PS 637: Performed by: INTERNAL MEDICINE

## 2022-06-08 PROCEDURE — 49452 REPLACE G-J TUBE PERC: CPT

## 2022-06-08 PROCEDURE — 258N000003 HC RX IP 258 OP 636: Performed by: HOSPITALIST

## 2022-06-08 PROCEDURE — 250N000011 HC RX IP 250 OP 636: Performed by: INTERNAL MEDICINE

## 2022-06-08 PROCEDURE — 250N000009 HC RX 250: Performed by: INTERNAL MEDICINE

## 2022-06-08 PROCEDURE — 999N000190 HC STATISTIC VAT ROUNDS

## 2022-06-08 PROCEDURE — 250N000013 HC RX MED GY IP 250 OP 250 PS 637: Performed by: HOSPITALIST

## 2022-06-08 PROCEDURE — 99239 HOSP IP/OBS DSCHRG MGMT >30: CPT | Performed by: HOSPITALIST

## 2022-06-08 RX ADMIN — Medication 40 MG: at 08:34

## 2022-06-08 RX ADMIN — CEFTRIAXONE SODIUM 2 G: 2 INJECTION, POWDER, FOR SOLUTION INTRAMUSCULAR; INTRAVENOUS at 13:53

## 2022-06-08 RX ADMIN — CARBAMAZEPINE 150 MG: 100 SUSPENSION ORAL at 11:03

## 2022-06-08 RX ADMIN — CHLORHEXIDINE GLUCONATE 15 ML: 1.2 SOLUTION ORAL at 08:34

## 2022-06-08 RX ADMIN — HYDROCORTISONE 15 MG: 10 TABLET ORAL at 08:33

## 2022-06-08 RX ADMIN — CALCIUM CARBONATE 1250 MG: 1250 SUSPENSION ORAL at 13:59

## 2022-06-08 RX ADMIN — Medication 50 MCG: at 08:33

## 2022-06-08 RX ADMIN — POTASSIUM & SODIUM PHOSPHATES POWDER PACK 280-160-250 MG 1 PACKET: 280-160-250 PACK at 13:52

## 2022-06-08 RX ADMIN — HYDROCORTISONE 5 MG: 5 TABLET ORAL at 13:52

## 2022-06-08 RX ADMIN — POTASSIUM & SODIUM PHOSPHATES POWDER PACK 280-160-250 MG 1 PACKET: 280-160-250 PACK at 08:42

## 2022-06-08 RX ADMIN — Medication 1 PACKET: at 08:51

## 2022-06-08 RX ADMIN — CALCIUM CARBONATE 1250 MG: 1250 SUSPENSION ORAL at 08:34

## 2022-06-08 RX ADMIN — ACETYLCYSTEINE 2 ML: 200 SOLUTION ORAL; RESPIRATORY (INHALATION) at 10:58

## 2022-06-08 RX ADMIN — SODIUM CHLORIDE: 9 INJECTION, SOLUTION INTRAVENOUS at 04:46

## 2022-06-08 RX ADMIN — Medication 15 ML: at 08:35

## 2022-06-08 RX ADMIN — CARBAMAZEPINE 150 MG: 100 SUSPENSION ORAL at 00:21

## 2022-06-08 RX ADMIN — ALBUTEROL SULFATE 2.5 MG: 2.5 SOLUTION RESPIRATORY (INHALATION) at 10:58

## 2022-06-08 RX ADMIN — MICONAZOLE NITRATE: 20 POWDER TOPICAL at 08:44

## 2022-06-08 RX ADMIN — LEVOTHYROXINE SODIUM 150 MCG: 100 TABLET ORAL at 06:44

## 2022-06-08 RX ADMIN — ASPIRIN 81 MG CHEWABLE TABLET 81 MG: 81 TABLET CHEWABLE at 08:33

## 2022-06-08 RX ADMIN — OXYCODONE HYDROCHLORIDE AND ACETAMINOPHEN 1000 MG: 500 TABLET ORAL at 08:33

## 2022-06-08 RX ADMIN — CARBAMAZEPINE 150 MG: 100 SUSPENSION ORAL at 06:44

## 2022-06-08 RX ADMIN — BRIVARACETAM 100 MG: 10 SOLUTION ORAL at 09:56

## 2022-06-08 ASSESSMENT — ACTIVITIES OF DAILY LIVING (ADL)
ADLS_ACUITY_SCORE: 55

## 2022-06-08 NOTE — IR NOTE
Interventional Radiology Intra-procedural Nursing Note    Patient Name: Keyon Farias  Medical Record Number: [id number]  Today's Date: June 8, 2022    Procedure: GJ tube exchange.   Start time: 1143  End time: 1153  Report provided to: Lashell CHRISTINE  Patient depart time and location: [address] to [address]    Note: Patient entered Interventional Radiology Suite number 1 via cart. Patient awake, alert and orientated. Assisted onto procedural table in supine position. Prepped and draped.  Dr. Negron in room. Procedure well tolerated by patient without complications. Procedure end with debrief by Dr. Negron  gauze dressing applied to insertion site  Administered medication totals:  none

## 2022-06-08 NOTE — PLAN OF CARE
DATE & TIME: 6/7/22, 5547 - 4880   Cognitive Concerns/ Orientation : HECTOR, nonverbal but makes incomprehensible sounds at time. Will shake head yes/no appropriately or thumbs up   BEHAVIOR & AGGRESSION TOOL COLOR: Green  CIWA SCORE: NA   ABNL VS/O2: VSS on room air  MOBILITY: Total cares, lift. Turned and repositioned. Right side hemiplegia  PAIN MANAGMENT: Denied. Absence of nonverbal indicators of pain  DIET: NPO. Continuous tube feed at 60 ml/hr and free water flushes of 60 ml q4h  BOWEL/BLADDER: Incontinent of B/B. External catheter in place with good output  ABNL LAB/BG: /111 AM labs pending  DRAIN/DEVICES: Left midline infusing at 100 ml/hr  TELEMETRY RHYTHM: NA  SKIN: Pale, scattered bruises. Rash to groin/memo area. Mepilex to coccyx - blanchable redness  TESTS/PROCEDURES: NA  D/C DATE: Pending improvement in sodium level  Discharge Barriers: Sodium levels  OTHER IMPORTANT INFO: Contact precautions maintained    Goal Outcome Evaluation:    Plan of Care Reviewed With: patient     Overall Patient Progress: improving

## 2022-06-08 NOTE — PROGRESS NOTES
Patient is on IR schedule today 6/8/22  for a GJ tube exchange.     -Orders for NPO have been entered.   -No NPO required.   -Phone consent was obtained from mother and guardian Savannah Farias after explaining the procedure, risks and benefits and consent is in IR.     Please contact the IR department at [phone] for procedural related questions.     Discussed with Hue Unger RN.    Thanks, Osiris [facility] Interventional Radiology CNP ([phone]) (phone [phone])

## 2022-06-08 NOTE — PLAN OF CARE
Goal Outcome Evaluation:    Plan of Care Reviewed With: patient     Overall Patient Progress: improving         DATE & TIME: 6/7/22 pm shift                          Cognitive Concerns/ Orientation : HECTOR. Aphasic at baseline, but pt makes incomprehensible sounds at times. Will shake head for yes/no appropriately.    BEHAVIOR & AGGRESSION TOOL COLOR: Green  CIWA SCORE: NA    ABNL VS/O2: VSS, O2 94% RA   MOBILITY: Total cares, lift. Turned and repositioned. Right side hemiplegia  PAIN MANAGMENT: Denied pain. Absence of nonverbal indicators of pain.   DIET: NPO, continuous tube feed at 60 ml.hr and free water flushes of 60 ml q4h  BOWEL/BLADDER: Incontinent of B/B. External catheter in place. No BM this shift  ABNL LAB/BG: /128, Na 129, Phosphorus 2.4 yesterday, on scheduled phosphorus TID.   DRAIN/DEVICES: Midline infusing  ml/hr, purewick in place with adequate UOP.   TELEMETRY RHYTHM: NA  SKIN: Pale, scattered bruises. +1 edema to upper extremities. Rash to groin/memo area, applied miconazole powder, Mepilex to coccyx-blanchable redness CDI  TESTS/PROCEDURES: NA  D/C DATE: pending improvement of Na level.   OTHER IMPORTANT INFO: Contact precautions maintained. On IV Rocephin q24h.

## 2022-06-08 NOTE — PROGRESS NOTES
[facility]    I received an email from pt's RN Care Coordinator, Karen stating Keyon's discharge has been pushed to 1500h. Pt's home care agency, [facility] will not be able to see pt this afternoon after he arrives back home to do IV education for his mom. [facility] is requesting that pt dose prior to discharge home. [facility] will see the pt on Thursday for resumption of care and IV education. Email sent to pt's RN Care Coordinator, Karen to update on the need for dosing prior to discharge.     Thank you for the referral.    [facility] Liaison  [phone] (Mon thru Fri 8am - 5pm)  [phone] Office

## 2022-06-08 NOTE — DISCHARGE SUMMARY
[facility]  Hospitalist Discharge Summary      Date of Admission:  5/26/2022  Date of Discharge:  6/8/2022  3:21 PM  Discharging Provider: Deloris Moffett MD  Discharge Service: Hospitalist Service    Discharge Diagnoses   Strep salivarius group bacteremia, unclear source.  UTI.  Septic shock, suspect due to above.  Acute hypoxic respiratory failure, possibly related to septic shock, resolved.  Lactic acidosis due to sepsis, resolved.  History of recurrent aspiration pneumonia with acute hypoxic respiratory failure and sepsis.    Follow-ups Needed After Discharge   Follow-up Appointments     Follow-up and recommended labs and tests       Follow up with primary care provider, [facility], within 7 days for   hospital follow- up.  No follow up labs or test are needed.  Now that Dr Gomez has moved out of state and you have asked to have   primary care with [place], a virtual appointment has been scheduled for   you with Dr Elsa Queen on Monday Jun 13, 2022 at 11:15 AM, [place]   [place].  Please see below             Unresulted Labs Ordered in the Past 30 Days of this Admission     No orders found from 4/26/2022 to 5/27/2022.      These results will be followed up by     Discharge Disposition   Discharged to home  Condition at discharge: Stable      Hospital Course    Keyon Farias is a 61 year old male with history including TBI with aphasia, R sided spastic hemiplegia and dysphagia with GJ-tube for TFs/meds; seizure disorder; panhypopituitarism; h/o multiple hospital admissions for recurrent pneumonia (last in 12/2021); who presented 5/26/2022 with fever and found with evidence of UTI with sepsis.    On 5/27/2022, RRT called and pt found to be in septic shock and transferred to the ICU and was intubated and started on pressors. BC's subsequently positive for Strep salivarius group, UC only with 50-100K mixed urogenital jaime. Able to be weaned off pressors 5/30/2022. Extubated  5/30/2022, transferred out of the ICU 6/1/2022.     Strep salivarius group bacteremia, unclear source.  UTI.  Septic shock, suspect due to above.  Acute hypoxic respiratory failure, possibly related to septic shock, resolved.  Lactic acidosis due to sepsis, resolved.  History of recurrent aspiration pneumonia with acute hypoxic respiratory failure and sepsis.  * H/o multiple hospitalizations for aspiration pneumonia; however, last in 12/2021. Has chronic GJ-tube.  * On admit 5/26: WBC 19.5, lactate 6.9, UA showed 180 WBC, lg LE, positive nitrites. Started on pip-tazo and vanco on admit 5/26. BC 5/26 subsequently positive for Strep salivarius group.   UC 5/26 subsequently grew 50-100K mixed urogenital jaime.   * 5/27: RRT called and transferred to ICU for septic shock and required pressors. CT CAP 5/27 showed possible basilar infiltrates vs. artifact of breathing motion at the lung bases; no definite cause for fever and abdominal pain demonstrated in the abdomen or pelvis; stable cystic lesion in the pancreas. Pip-tazo stopped and started on meropenem 5/27. BC's 5/27 NGTD.  * 5/29: Antibiotics changed to ceftriaxone.  * 5/30: Pressors stopped and extubated.  * 6/1: Off O2.  *6/3: Appreciate OMFS consult - no need for teeth extraction. Continue oral cares. Per ID, patient will need 4 weeks of IV ceftriaxone. Midline placement today.                               Recent Labs   Lab 06/02/22  0750 05/29/22  0412 05/28/22  1157 05/28/22  0437 05/27/22  2354 05/27/22  1832 05/27/22  1411 05/27/22  0835 05/27/22  0650 05/26/22  2337 05/26/22  1523 05/26/22  1519   WBC 8.8 10.2  --  20.2*  --   --  30.7*  --  28.5*  --  19.5*  --    LACT  --   --  3.8*  --  6.6* 6.8* 6.9* 0.7  --  1.8  --  1.4   - Continue ceftriaxone (started 5/29) - plan 2 weeks antibiotics for now (stop 6/9).  -Appreciate ID recommendations (echocardiogram, OMFS consultation) regarding strep salivarius group bacteremia,  - Continue PTA acetylcysteine +  "albuterol nebs and PRN nebs.  - Continue IS, pulmonary toilet.  - Monitor cultures.  -Echo as below, no evidence of vegetations, LVEF 55-60%  -OMFS recommendations following dental/facial bone CT: input appreciated; no need for dental extractions at this time; \"more likely source is mucosa of oral cavity or upper airway, as this is where s. salivarius is commonly found\".  -- continue ceftriaxone per ID recs.      Hyponatremia  -- Na 128 on 6/6/2022, 142 two days prior  --Sodium increased to 129 today.  -- etiology unclear  -- will increase IV fluid rate today.  Speak with nutritionist about decreasing free water flushes.  --monitor sodium daily     Volume overload, suspect from fluid resuscitation.  * Given IV furosemide x1 for crackles/chest congestion on 6/1 with good response.  - Monitor clinically for need for further diuretics holding off in the absence of any O2 requirement.  - Monitor i/o's, daily wts.     Anemia, suspect dilutional component.  * Hgb normal on admit. No overt clinical signs of major bleeding.           Recent Labs   Lab 06/02/22  0750 05/29/22  0412 05/28/22  0437 05/27/22  1411 05/27/22  0650 05/26/22  1523   HGB 10.5* 10.6* 11.2* 11.6* 14.0 13.8   - Monitor CBC.  - Consider prbc transfusion if hgb </= 7.0 or if significant bleeding with hemodynamic instability or if symptomatic.     Thrombocytopenia, improving, unclear etiology, possibly secondary to bacteremia and/or medication effect or chronic from other cause.  * Plts normal on admit.  * Plts down to 144K on 5/28.                  Recent Labs   Lab 06/02/22  0750 05/29/22  0412 05/28/22  0437 05/27/22  1411 05/27/22  0650 05/26/22  1523    143* 144* 168 161 186   - Monitor CBC.  - Consider platelet transfusion if needs a procedure and less than 50,000; or if less than 10,000.     TBI with aphasia, dysphagia, and R sided spastic hemiplegia.   Dysphagia with GJ-tube for TFs.  * Patient's mother is his caregiver, along with home " care attendants. Has had multiple hospitalizations for recurrent pneumonias, but last in 12/2021.  - Continue TF's.  - holding PTA scheduled metoclopramide in setting of diarrhea.  - Resume PRN scopolamine at discharge.  - Continue PT, OT.     Seizure disorder.  Secondary to TBI. Chronic and stable on PTA AEDs.  - Continue brivaracetam, carbamazepine.     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  * Started on stress dose of IV hydrocortisone in the ICU.  -Status post stress steroids while in septic shock, now quickly tapered down and  back to normal PTA PO dosing (15 mg qam and 5 mg qpm at 2 pm) resumed on 6/2.  - Continue levothyroxine.  - Continue testosterone IM weekly.  -He remains euglycemic on 6/3/2022 discontinue glycemic checks  -Place patient out of bed     GERD.  - Continue pantoprazole.     Diarrhea requiring fecal management system, has been on antibiotics since day of admission, afebrile, normal WBCs, no abdominal pain on exam.  - Holding Reglan as above  - If spikes fever or has abdominal pain would recommend checking C. 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  History of DVT.  - Noted.  -Currently hemodynamically stable so we will discontinue telemetry monitoring           Clinically Significant Risk Factors Present on Admission                   COVID-19 testing.                                               COVID-19 PCR Results    COVID-19 PCR Results 8/9/21 8/24/21 9/30/21 10/28/21 11/12/21 11/24/21 12/5/21 12/27/21 5/9/22 5/26/22   COVID-19 Virus PCR to [facility] - Result                       COVID-19 Virus PCR to [facility] - Source                       SARS-CoV-2 Virus Specimen Source                       Flu A/B & SARS-COV-2 PCR Source                       SARS-CoV-2 PCR Result                       SARS CoV2 PCR Negative Negative Negative Negative Negative Negative Negative Negative Negative Negative       Comments are available for some flowsheets but are not being displayed.                  Consultations This Hospital Stay   NUTRITION SERVICES ADULT IP CONSULT  PHARMACY TO DOSE VANCO  VASCULAR ACCESS ADULT IP CONSULT  VASCULAR ACCESS ADULT IP CONSULT  PHARMACY IP CONSULT  VASCULAR ACCESS ADULT IP CONSULT  INTENSIVIST IP CONSULT  INFECTIOUS DISEASES IP CONSULT  ORAL SURGERY IP CONSULT  CARE MANAGEMENT / SOCIAL WORK IP CONSULT  VASCULAR ACCESS ADULT IP CONSULT  VASCULAR ACCESS ADULT IP CONSULT  VASCULAR ACCESS ADULT IP CONSULT    Code Status   Prior    Time Spent on this Encounter   I, Deloris Moffett MD, personally saw the patient today and spent greater than 30 minutes discharging this patient.       Deloris Moffett MD  [place] [address] [facility]  [address] [place] [address]  Phone: [phone]  ______________________________________________________________________    Physical Exam   Vital Signs:                    Weight: 156 lbs 8 oz  General Appearance: Not in apparent distress  Respiratory: CTAB, no rales or ronchi  Cardiovascular: S1, S2 normal, no murmurs  GI: non-tender on palpation, BS present         Primary Care Physician   [facility]    Discharge Orders      Medication Therapy Management Referral      Home [facility]fusion Referral      Home Care Referral      Primary Care - Care Coordination Referral      Reason for your hospital stay    You were treated for infection in your blood     Follow-up and recommended labs and tests     Follow up with primary care provider, [facility], within 7 days for hospital follow- up.  No follow up labs or test are needed.  Now that Dr Gomez has moved out of state and you have asked to have primary care with [facility], a virtual appointment has been scheduled for you with Dr Elsa Queen on Monday Jun 13, 2022 at 11:15 AM, [facility].  Please see below     Activity    Your activity upon discharge: activity as tolerated     Diet    Follow this diet upon discharge: Orders Placed This Encounter       Adult Formula Drip Feeding: Continuous Jevity 1.5; Jejunostomy; Goal Rate: 60; mL/hr; Medication - Feeding Tube Flush Frequency: At least 15-30 mL water before and after medication administration and with tube clogging; Amount to Send (Nutrition u...      NPO for Medical/Clinical Reasons Except for: No Exceptions       Significant Results and Procedures   Most Recent 3 CBC's:  Recent Labs   Lab Test 06/02/22  0750 05/29/22  0412 05/28/22  0437   WBC 8.8 10.2 20.2*   HGB 10.5* 10.6* 11.2*   MCV 93 93 94    143* 144*     Most Recent 3 BMP's:  Recent Labs   Lab Test 06/08/22  1208 06/08/22  0826 06/08/22  0654 06/08/22  0437 06/07/22  0814 06/07/22  0658 06/06/22  0811 06/06/22  0758 06/05/22  1149 06/05/22  1103 06/04/22  1143 06/04/22  0901 06/02/22  0830 06/02/22  0750 06/01/22  0812 06/01/22  0335 05/30/22  0614 05/30/22  0425   NA  --   --  131*  --   --  129*  --  128*  --   --   --   --   --  142  --  142   < > 149*   POTASSIUM  --   --   --   --   --   --   --  4.2  --  4.4  --  4.3   < > 4.1  --  3.8   < > 3.5   CHLORIDE  --   --   --   --   --   --   --  96  --   --   --   --   --  111*  --   --   --  118*   CO2  --   --   --   --   --   --   --  25  --   --   --   --   --  28  --   --   --  28   BUN  --   --   --   --   --   --   --  20  --   --   --   --   --  21  --   --   --  9   CR  --   --   --   --   --   --   --  0.76  --   --   --   --   --  0.64*  --  0.71   < > 0.74   ANIONGAP  --   --   --   --   --   --   --  7  --   --   --   --   --  3  --   --   --  3   STEVE  --   --   --   --   --   --   --  7.9*  --   --   --   --   --  7.3*  --   --   --  7.0*   * 81  --  111*   < >  --    < > 103*   < >  --    < >  --    < > 76   < >  --    < > 121*    < > = values in this interval not displayed.     Most Recent 2 LFT's:  Recent Labs   Lab Test 05/27/22  1411 05/27/22  0835   AST 42 50*   ALT 21 26   ALKPHOS 84 106   BILITOTAL 1.2 1.7*       Discharge Medications   Discharge Medication List as  of 6/8/2022  2:14 PM      START taking these medications    Details   cefTRIAXone (ROCEPHIN) 1 GM vial Inject 2 g (2,000 mg) into the vein daily for 20 days CBC with differential, creatinine, SGOT weekly while on this medication to be faxed to Dr. Gaitan office., Disp-600 mL, R-0, Transitional      chlorhexidine (PERIDEX) 0.12 % solution Swish and spit 15 mLs in mouth 2 times daily for 14 days Please see your PCP to evaluate for possible refills., Disp-420 mL, R-0, E-Prescribe         CONTINUE these medications which have NOT CHANGED    Details   acetaminophen (TYLENOL) 650 MG CR tablet Take 650 mg by mouth every 8 hours as needed for mild pain or fever, Historical      acetylcysteine (MUCOMYST) 20 % neb solution Take 2 mLs by nebulization 4 times daily With albuterol at 0700, 1100, 1500, and 1900, Historical      albuterol (PROVENTIL) (5 MG/ML) 0.5% neb solution Take 2.5 mg by nebulization every 4 hours (while awake) 0700 1100 1500 1900 with mucomyst, Historical      aspirin (ASA) 81 MG chewable tablet 81 mg by Oral or Feeding Tube route daily At 0900, Historical      bacitracin ointment Apply topically daily as needed for wound care To PEG site.Historical      Brivaracetam (BRIVIACT) 10 MG/ML solution 100 mg by Oral or Feeding Tube route 2 times daily 0900, 2100, Historical      calcium carbonate 1250 MG/5ML SUSP suspension Take 1,250 mg by mouth 3 times daily 0900, 1500, 2100, Historical      !! carBAMazepine (TEGRETOL) 100 MG/5ML suspension Take 100 mg by mouth daily Take at 1800, Historical      !! carBAMazepine (TEGRETOL) 100 MG/5ML suspension 150 mg by Oral or Feeding Tube route 3 times daily At 06:00, 12:00, and 24:00 for seizures, Historical      hydrocortisone (CORTAID) 1 % external cream Apply topically 2 times daily as needed Apply to reddened memo areas as neededHistorical      hydrocortisone (CORTEF) 5 MG tablet Take 15 mg (3 tablets) in the morning and 5 mg (1 tablet)  at 2:00 PM. During illness  patient takes more as a stress dose. Please increase the dose as directed., Disp-400 tablet, R-3, E-Prescribe      levothyroxine (SYNTHROID/LEVOTHROID) 150 MCG tablet Take 1 tablet (150 mcg) by mouth daily, Disp-90 tablet, R-3, E-Prescribe      metoclopramide (REGLAN) 10 MG/10ML SOLN solution Take 10 mg by mouth 4 times daily (before meals and nightly) 0800, 1200, 1600, 2000  Disconnects bag before administration, then waits 45 mins before reconnecting after giving the medication, Historical      multivitamin, therapeutic (THERA-VIT) TABS tablet Take 1 tablet by mouth daily, Historical      mupirocin (BACTROBAN) 2 % external ointment Apply topically 2 times daily as needed Historical      pantoprazole (PROTONIX) 2 mg/mL SUSP suspension 20 mLs (40 mg) by Per J Tube route daily, Disp-400 mL, R-0, E-PrescribeFuture refills by PCP Dr. [facility] with phone number [phone].      potassium & sodium phosphates (NEUTRA-PHOS) 280-160-250 MG Packet Take 1 packet by mouth 3 times daily Takes at 0800, 1500, and 2100, Historical      Scopolamine HBr POWD Dispense #90. Mix contents with small amount of water for admin via J-tube.  Administer 0.8 mg three times each day as needed., Disp-450 g, R-1, E-Prescribe      Skin Protectants, Misc. (BALMEX SKIN PROTECTANT) OINT Externally apply topically 2 times daily as needed (irritation) Applay to reddened memo areas twice daily as neededHistorical      sodium bicarbonate 650 MG tablet Take 1 tablet (650 mg) by mouth 2 times daily, Disp-30 tablet, R-0, E-Prescribe      testosterone cypionate (DEPOTESTOSTERONE) 200 MG/ML injection Inject 0.3 mLs (60 mg) into the muscle every 7 days New dose, Disp-4 mL, R-1, E-Prescribe      vitamin C (ASCORBIC ACID) 1000 MG TABS 1,000 mg by Oral or Feeding Tube route daily , Historical      vitamin D3 (CHOLECALCIFEROL) 2000 units (50 mcg) tablet Take 2,000 Units by mouth daily Crush and feed via j-tube @@ 0900, Historical       !! - Potential  duplicate medications found. Please discuss with provider.        Allergies   Allergies   Allergen Reactions     Valproic Acid Other (See Comments)     Toxicity w/ bone marrow suspension, elevated ammonia levels      Dilantin [Phenytoin Sodium] Other (See Comments)     Severe Trembling     Scopolamine Hives     Hives with the patch - oral no problem

## 2022-06-08 NOTE — PROGRESS NOTES
CLINICAL NUTRITION SERVICES - REASSESSMENT NOTE    RECOMMENDATIONS FOR MD/PROVIDER TO ORDER:   - Defer discharge fluid flushes to MD - pt typically receives 0289-5270 mL daily from free water (160-200 mL flushes q 4 hours).    Recommendations Ordered by Registered Dietitian (RD):   - Discontinue banatrol (last BM )   - Continue TF Jevity 1.5  @ 60 mL/hr x 22 hours, held two hours/day for Synthroid dosing.    Malnutrition:   ()  % Weight Loss:  None noted - wt appears stable in the 160s  % Intake:  No decreased intake noted  Subcutaneous Fat Loss:  (22 - RD assessment) Orbital region moderate depletion- baseline  Muscle Loss:  (22 -  Assessment) Temporal region moderate depletion and Clavicle bone region moderate depletion - baseline  Fluid Retention:  None noted     Malnutrition Diagnosis: Patient does not meet two of the above criteria necessary for diagnosing malnutrition     EVALUATION OF PROGRESS TOWARD GOALS   Diet: NPO - Baseline    Nutrition Support: continues at goal as follows -   Nutrition Support Enteral:  Type of Feeding Tube: Jejunostomy  Enteral Frequency:  22 hr cycle  Enteral Regimen: Jevity 1.5 at 60 mL/hr x 22 hours per day (holding TF 1 hour before and 1 hour after Synthroid dose)  Total Enteral Provisions: 1980 kcal (27 kcal/kg), 90 g protein (1.2 g/kg), 28 g fiber, 1003 mL H2O  Free Water Flush: 60 mL q 4 hours   Banatrol BID provides an additional 80 kcal, 4 g fiber.     Intake/Tolerance:   - Tolerating TF  - Labs reviewed:   Na up to 131 today, still slightly low.   Phos 2.4 (L)  ---> NaCl IVF @ 100 mL/hr, pt also receiving NutraPhos TID  - Stooling: (rectal tube out )  Last BM recorded   - Weight trendin kg today - consistent with weight on admission (73.6 kg)     Meds: Banatrol BID, High sliding scale insulin, Synthroid, NeutraPhos, Vitamin C, Vitamin D3, NaCl IVF @ 100 mL/hr.     ASSESSED NUTRITION NEEDS:  Dosing Weight 73.6 kg  Estimated Energy  Needs: 9731-6630 kcals (25-30 Kcal/Kg)  Justification: maintenance  Estimated Protein Needs:  grams protein (1.2-1.5 g pro/Kg)  Justification: maintenance    NEW FINDINGS:   - Dispo: ?discharge today    Previous Goals:   TF + Banatrol to meet at least 90% estimated needs  Evaluation: Met    Previous Nutrition Diagnosis:   Altered GI function related to unknown etiology as evidenced by liquid stool over the last 3 days, need for fiber modular   Evaluation: Completed    CURRENT NUTRITION DIAGNOSIS  Altered nutrition-related lab value related to ?fluid imbalance as evidenced by need for NaCl IVF and decreased free water from baseline, Na trending up to 131 (L) today.     INTERVENTIONS  Recommendations / Nutrition Prescription  Continue TF and flushes as ordered  Defer changes to flushes to MD  Discontinue Banatrol given decrease in stooling frequency    Implementation  EN Composition- stop banatrol    Goals  Tf @ goal to provide % est needs.       MONITORING AND EVALUATION:  Progress towards goals will be monitored and evaluated per protocol and Practice Guidelines    Marisel Fernández RD, LD  [facility], [facility], [facility]  Pager: [phone]  Weekend Pager: [phone]

## 2022-06-08 NOTE — TELEPHONE ENCOUNTER
[facility].  Last seen here  8/19/21 by Dr. Daily. Had another primary that left state.    Homecare needs a verbal from someone to have pt seen for the ongoing homecare orders.  Pt has visit with Dr. Queen scheduled 6/13/22 to reestablish here (was seen by  in last year).  Did make it very clear that if verbal given, it's contingent on pt must come to appt.    Dr. Queen, are you ok giving verbal for the following as long as pt comes to visit 6/13/22?:  SN 2w2, 1w2 then reassess?        Call back Graham at [facility]    [phone]

## 2022-06-08 NOTE — PROGRESS NOTES
[facility]    Infectious Disease Progress Note    Date of Service (when I saw the patient): 06/08/2022     Assessment & Plan   Keyon Farias is a 61 year old male who was admitted on 5/26/2022.     Impression:  1. 61-year-old male, very well known to our group, admitted for a readmission after numerous recent admissions for acute fever, aspiration pneumonia.   2.  Prior history of numerous aspiration pneumonia, sepsis and fever events, has had chronic Pseudomonas colonization which, of note, has at times historically been piperacillin resistant, also has other known resistant pathogens, both MRSA and VRE.   3.  Traumatic brain injury, chronic aphasia and spastic paralysis.   4.  History of seizure disorder.   5.  MRSA and VRE colonization.   6. Admitted with fever.   7. Admission cultures 2/2 positive for strep salivarius , very poor dentition         RECOMMENDATIONS:   1. ceftriaxone 4 weeks, OPIV orders are in day 13/28 today   2. No clear vegetations on the  echocardiogram   3. Ok to place midline   4. appreciate OMFS consult          Senait Orona MD    Interval History   Tolerating antibiotics ok   No new rashes or issues with antibiotics   Labs reviewed   Afebrile   Out of the icu    Physical Exam   Temp: 97.6  F (36.4  C) Temp src: Axillary BP: 110/62 Pulse: 57   Resp: 18 SpO2: 97 % O2 Device: None (Room air)    Vitals:    06/05/22 0706 06/06/22 0614 06/07/22 0043   Weight: 77.3 kg (170 lb 6.4 oz) 73.9 kg (162 lb 14.4 oz) 71 kg (156 lb 8 oz)     Vital Signs with Ranges  Temp:  [97.5  F (36.4  C)-98.3  F (36.8  C)] 97.6  F (36.4  C)  Pulse:  [57-75] 57  Resp:  [16-18] 18  BP: (110-115)/(55-62) 110/62  SpO2:  [96 %-99 %] 97 %    Constitutional: chronically ill appearing, poor dentition   Lungs: Clear to auscultation bilaterally, no crackles or wheezing  Cardiovascular: Regular rate and rhythm, normal S1 and S2, and no murmur noted  Abdomen: Normal bowel sounds, soft, non-distended,  non-tender  Skin: No rashes, no cyanosis, no edema  Other:    Medications     dextrose       dextrose       sodium chloride 100 mL/hr at 06/08/22 0446       acetylcysteine  2 mL Nebulization 4x Daily     albuterol  2.5 mg Nebulization Q4H While awake     aspirin  81 mg Oral or Feeding Tube Daily     Brivaracetam  100 mg Oral or Feeding Tube BID     calcium carbonate  1,250 mg Oral or Feeding Tube TID     carBAMazepine  100 mg Oral or Feeding Tube Daily     carBAMazepine  150 mg Oral or Feeding Tube TID     cefTRIAXone  2 g Intravenous Q24H     chlorhexidine  15 mL Swish & Spit BID     hydrocortisone  15 mg Oral or Feeding Tube QAM     hydrocortisone  5 mg Per Feeding Tube Daily     insulin aspart  1-12 Units Subcutaneous Q4H     levothyroxine  150 mcg Oral or Feeding Tube Daily     [Held by provider] metoclopramide  10 mg Oral or Feeding Tube 4x Daily AC & HS     miconazole   Topical BID     multivitamins w/minerals  15 mL Oral or Feeding Tube Daily     pantoprazole  40 mg Per J Tube Daily     potassium & sodium phosphates  1 packet Oral or Feeding Tube TID     sodium chloride (PF)  10 mL Intracatheter Q8H     sodium chloride (PF)  10-40 mL Intracatheter Q7 Days     sodium chloride (PF)  3 mL Intracatheter Q8H     vitamin C  1,000 mg Oral or Feeding Tube Daily     vitamin D3  50 mcg Oral or Feeding Tube Daily       Data   All microbiology laboratory data reviewed.  Recent Labs   Lab Test 06/02/22  0750 05/29/22  0412 05/28/22  0437   WBC 8.8 10.2 20.2*   HGB 10.5* 10.6* 11.2*   HCT 32.9* 33.3* 35.4*   MCV 93 93 94    143* 144*     Recent Labs   Lab Test 06/06/22  0758 06/02/22  0750 06/01/22  0335   CR 0.76 0.64* 0.71     No lab results found.  Recent Labs   Lab Test 05/23/21  0316 05/23/21  0250 04/15/21  2250 02/22/21  1622 02/22/21  1439 02/22/21  1413 02/19/21  1155 02/19/21  1123 01/15/21  0214   CULT No growth No growth No growth No growth No growth No growth No growth No growth >100,000  colonies/mL  Klebsiella pneumoniae  *

## 2022-06-08 NOTE — PROGRESS NOTES
Care Management Discharge Note    Discharge Date: 06/08/2022       Discharge Disposition: Home Infusion    Discharge Services: Transportation Services    Discharge DME:  (Pt's mother is renting an electric nidia)    Discharge Transportation: health plan transportation    Private pay costs discussed: Not applicable    PAS Confirmation Code: NA   Patient/family educated on Medicare website which has current facility and service quality ratings:  NA    Education Provided on the Discharge Plan: yes   Persons Notified of Discharge Plans: Pt's mother was called   Patient/Family in Agreement with the Plan:  yes    Handoff Referral Completed: Yes, [facility]    Additional Information:  Pt's sodium level is up to 131.  Have messaged Dr Moffett this AM.  Pt is able to be discharged.  Transportation is set-up for 10:30 this AM.  Sent an e-mail to [facility] to ensure they will have a nurse available to go to pt's home today for his antibiotic that is planned for four weeks.  Pt's mother, who is also his Guardian, has been updated.        Karen Collins, RN   Inpatient Care Management  [phone]

## 2022-06-08 NOTE — DISCHARGE SUMMARY
Pt is discharging back to home with transportation. VSS, pt clean and changed. Mother will care for pt at home along with home health. Left midline is locked and capped. GJ tube clammed. AVS printed and all medications given to transport in pt's bags. All belongings went home with pt.

## 2022-06-08 NOTE — DISCHARGE INSTRUCTIONS
[facility] will plan to meet at your home tomorrow for teaching on IV antibiotic therapy.  Their phone number is ([phone])

## 2022-06-09 ENCOUNTER — PATIENT OUTREACH (OUTPATIENT)
Dept: CARE COORDINATION | Facility: CLINIC | Age: 62
End: 2022-06-09
Payer: MEDICARE

## 2022-06-09 NOTE — PROGRESS NOTES
Clinic Care Coordination Contact  [facility]/Voicemail      Clinical Data: CHW Outreach    Outreach attempted x 1 regarding CCC enrollment.  Left message on Patient Mother's voicemail with call back information and requested return call.    Plan: CHW will attempt another outreach to the patient via phone regarding enrollment into CCC in 1-2 business days.    WALLY Wheeler  Clinic Care Coordination   [facility]   Phone: [phone]  [email or web address]

## 2022-06-10 NOTE — PROGRESS NOTES
Clinic Care Coordination Contact  Community Health Worker Initial Outreach    Spoke to Patient's Mother ([place])     CHW Introduced intent of call regarding recent discharge.    Patient's Mother reports that Patient is doing okay. Further expressing that Patient has been tired lately further expressing that it has to do with the medication Patient is taking. CHW acknowledged.       CHW introduce Clinic Care Coordination and Patient's Mother responded in needing support with finding additional support (nursing care support) at home with caring for Patient.     CHW acknowledged and scheduled Patient for an CCC Phone Visit with CS CCC SW for an [facility] Assessment on 06/16/2022 at 2:00PM. Patient acknowledged.     CHW confirms upcoming appointments; ED/ Hospital Follow Up 06/13/2022 11:15AM. Patient's Mother acknowledged.     CHW acknowledged and provided Patient's Mother with CHW contact information for additional support needed.     CHW Initial Information Gathering:  Referral Source: IP Handoff  Preferred Hospital: [facility]  [phone]  Preferred Urgent Care: [facility], [phone]  Current living arrangement:: I live in a private home with family  Community Resources: PCA  Informal Support system:: Family, Parent  No PCP office visit in Past Year: No  CHW Additional Questions  If ED/Hospital discharge, follow-up appointment scheduled as recommended?: Yes  Medication changes made following ED/Hospital discharge?: Yes, patient to be scheduled with CC RN/SW within approx 1 business day  MyChart active?: No  Patient agreeable to assistance with activating MyChart?: No    Patient accepts CC: Yes. Patient scheduled for assessment with [facility] SW on 06/16/2022 at 2:00PM. Patient noted desire to discuss Patient's Mother responded in needing support with finding additional support (nursing care support) at home with caring for Patient.     [employer]: Post-Discharge  Note  SITUATION                                                      Admission:    Admission Date: 05/26/22   Reason for Admission: You were treated for infection in your blood  Discharge:   Discharge Date: 06/08/22  Discharge Diagnosis: Septic shock (H), Urinary tract infection in male, Sepsis, due to unspecified organism, unspecified whether acute organ dysfunction present (H)    BACKGROUND                                                      Per hospital discharge summary and inpatient provider notes:  Keyon Farias is a 61 year old male who presents with urinary tract infection.  Admitted for further evaluation and treatment. Patient is aphasic therefore history of present illness may be limited as well as review of systems.  Sodium is 132 on admission with a creatinine of 0.92 lactic acid is 1.4.  pH is 7.4 2/61/20/40.  White blood cell count is elevated at 19.5.  Urinalysis shows large leukocyte Estrace.  History of ESBL.  COVID-negative.  Chest x-ray negative.    ASSESSMENT      Enrollment  Primary Care Care Coordination Status: Potential    Discharge Assessment  How are you doing now that you are home?: Patient's Mother expressed that thing are going pretty good.  How are your symptoms? (Red Flag symptoms escalate to triage hotline per guidelines): Improved  Do you feel your condition is stable enough to be safe at home until your provider visit?: Yes  Does the patient have their discharge instructions? : Yes  Does the patient have questions regarding their discharge instructions? : No  Were you started on any new medications or were there changes to any of your previous medications? : Yes  Does the patient have all of their medications?: Yes  Do you have questions regarding any of your medications? : No  Do you have all of your needed medical supplies or equipment (DME)?  (i.e. oxygen tank, CPAP, cane, etc.): Yes  Discharge follow-up appointment scheduled within 14 calendar days? : Yes  Discharge Follow Up  Appointment Date: 06/13/22  Discharge Follow Up Appointment Scheduled with?: Primary Care Provider    PLAN                                                      Outpatient Plan:  CC Above    Future Appointments   Date Time Provider Department Center   6/13/2022 11:30 AM [facility]   6/14/2022  1:30 PM [facility]   6/16/2022  2:00 PM [facility]    7/25/2022  7:00 AM Pola Pope MD [facility]         For any urgent concerns, please contact our 24 hour nurse triage line: [phone] ([phone])       WALLY Wheeler  Clinic Care Coordination   [facility]   Phone: [phone]  [email or web address]

## 2022-06-10 NOTE — PROGRESS NOTES
This is a recent snapshot of the patient's [facility] medical record.  For current drug dose and complete information and questions, call [phone] or In Basket pool, fv home infusion ([id number])  CSN Number:  [id number]

## 2022-06-13 ENCOUNTER — VIRTUAL VISIT (OUTPATIENT)
Dept: FAMILY MEDICINE | Facility: CLINIC | Age: 62
End: 2022-06-13
Payer: MEDICARE

## 2022-06-13 DIAGNOSIS — E23.0 PANHYPOPITUITARISM (H): ICD-10-CM

## 2022-06-13 DIAGNOSIS — R13.10 DYSPHAGIA, UNSPECIFIED TYPE: ICD-10-CM

## 2022-06-13 DIAGNOSIS — S06.9X9S TRAUMATIC BRAIN INJURY WITH LOSS OF CONSCIOUSNESS, SEQUELA (H): Primary | ICD-10-CM

## 2022-06-13 DIAGNOSIS — G40.909 RECURRENT SEIZURES (H): ICD-10-CM

## 2022-06-13 PROCEDURE — 99214 OFFICE O/P EST MOD 30 MIN: CPT | Mod: 95 | Performed by: INTERNAL MEDICINE

## 2022-06-13 NOTE — TELEPHONE ENCOUNTER
Verbal given for above requested homecare orders.  Homecare to send orders over for PCP signature.  Raissa Recio, RN  [facility] RN Triage Team

## 2022-06-13 NOTE — PROGRESS NOTES
Keyon is a 61 year old who is being evaluated via a billable video visit.      How would you like to obtain your AVS? MyChart  If the video visit is dropped, the invitation should be resent by: Text to cell phone: [phone]  Will anyone else be joining your video visit? No        Assessment & Plan     Traumatic brain injury with loss of consciousness, sequela (H)  Stable. Has a g-tube. Wheelchair bound.     Panhypopituitarism (H)  Stable. On medication     Dysphagia, unspecified type  Has a g-tube    Recurrent seizures (H)  Stable. Cont meds.      See Patient Instructions    No follow-ups on file.    JACOB BERGERON MD  [facility]    Subjective   Keyon is a 61 year old who presents for the following health issues     HPI   Keyon Farias is a 61 year old male with history including TBI with aphasia, R sided spastic hemiplegia and dysphagia with GJ-tube for TFs/meds; seizure disorder; panhypopituitarism  Was admitted to [facility] for  Urosepsis.    He was discharged on IV abx and has a midline.     His mom takes care of him at home.     This is an establish care visit.     Keyon is doing well. Shows a thumbs up to me on video.     Medications reviewed. Problem list updated. Labs reviewed.   No new concerns.     Post Discharge Outreach 6/10/2022   Admission Date 5/26/2022   Reason for Admission You were treated for infection in your blood   Discharge Date 6/8/2022   Discharge Diagnosis Septic shock (H), Urinary tract infection in male, Sepsis, due to unspecified organism, unspecified whether acute organ dysfunction present (H)   How are you doing now that you are home? Patient's Mother expressed that thing are going pretty good.   How are your symptoms? (Red Flag symptoms escalate to triage hotline per guidelines) Improved   Do you feel your condition is stable enough to be safe at home until your provider visit? Yes   Does the patient have their discharge instructions?  Yes   Does the patient  have questions regarding their discharge instructions?  No   Were you started on any new medications or were there changes to any of your previous medications?  Yes   Does the patient have all of their medications? Yes   Do you have questions regarding any of your medications?  No   Do you have all of your needed medical supplies or equipment (DME)?  (i.e. oxygen tank, CPAP, cane, etc.) Yes   Discharge follow-up appointment scheduled within 14 calendar days?  Yes   Discharge Follow Up Appointment Date 6/13/2022   Discharge Follow Up Appointment Scheduled with? Primary Care Provider     Hospital Follow-up Visit:    Hospital/Nursing Home/IP Rehab Facility: [facility]  Date of Admission: 05/26/2022  Date of Discharge: 06/08/2022  Reason(s) for Admission:     Strep salivarius group bacteremia, unclear source.  UTI.  Septic shock, suspect due to above.  Acute hypoxic respiratory failure, possibly related to septic shock, resolved.  Lactic acidosis due to sepsis, resolved.  History of recurrent aspiration pneumonia with acute hypoxic respiratory failure and sepsis.  Was your hospitalization related to COVID-19? No   Problems taking medications regularly:  None  Medication changes since discharge: None  Problems adhering to non-medication therapy:  None    Summary of hospitalization:  [facility] discharge summary reviewed  Diagnostic Tests/Treatments reviewed.  Follow up needed: none  Other Healthcare Providers Involved in Patient s Care:         None  Update since discharge: improved.   Post Discharge Medication Reconciliation: discharge medications reconciled, continue medications without change.  Plan of care communicated with patient      Review of Systems       Objective       Vitals:  No vitals were obtained today due to virtual visit.    Physical Exam   GEN: No acute distress  RESP: No audible increased work of breathing. Patient speaking in full sentences without  distress.  PSYCH: pleasant  Exam otherwise limited due to virtual platform          Video-Visit Details    Type of service:  Video Visit    Video start Time:11:36 am    End time: 11: 56 am    Originating Location (pt. Location): Home    Distant Location (provider location):  [facility]     Platform used for Video Visit: [employer]

## 2022-06-14 ENCOUNTER — LAB REQUISITION (OUTPATIENT)
Dept: LAB | Facility: CLINIC | Age: 62
DRG: 177 | End: 2022-06-14
Payer: MEDICARE

## 2022-06-14 ENCOUNTER — VIRTUAL VISIT (OUTPATIENT)
Dept: PHARMACY | Facility: CLINIC | Age: 62
End: 2022-06-14
Attending: HOSPITALIST
Payer: MEDICAID

## 2022-06-14 DIAGNOSIS — A41.52 SEPSIS DUE TO PSEUDOMONAS (H): ICD-10-CM

## 2022-06-14 DIAGNOSIS — E23.0 PANHYPOPITUITARISM (H): ICD-10-CM

## 2022-06-14 DIAGNOSIS — A41.9 SEVERE SEPSIS (H): Primary | ICD-10-CM

## 2022-06-14 DIAGNOSIS — J69.0 PNEUMONITIS DUE TO INHALATION OF FOOD AND VOMIT (H): ICD-10-CM

## 2022-06-14 DIAGNOSIS — Z78.9 TAKES DIETARY SUPPLEMENTS: ICD-10-CM

## 2022-06-14 DIAGNOSIS — E29.1 HYPOGONADISM MALE: ICD-10-CM

## 2022-06-14 DIAGNOSIS — R52 INTERMITTENT PAIN: ICD-10-CM

## 2022-06-14 DIAGNOSIS — E87.1 HYPONATREMIA: ICD-10-CM

## 2022-06-14 DIAGNOSIS — K21.9 GASTROESOPHAGEAL REFLUX DISEASE, UNSPECIFIED WHETHER ESOPHAGITIS PRESENT: ICD-10-CM

## 2022-06-14 DIAGNOSIS — Z87.820 HISTORY OF TRAUMATIC BRAIN INJURY: ICD-10-CM

## 2022-06-14 DIAGNOSIS — R65.20 SEVERE SEPSIS (H): Primary | ICD-10-CM

## 2022-06-14 DIAGNOSIS — J96.01 ACUTE RESPIRATORY FAILURE WITH HYPOXIA (H): ICD-10-CM

## 2022-06-14 DIAGNOSIS — G40.909 RECURRENT SEIZURES (H): ICD-10-CM

## 2022-06-14 DIAGNOSIS — E03.9 HYPOTHYROIDISM, UNSPECIFIED TYPE: ICD-10-CM

## 2022-06-14 DIAGNOSIS — Z79.82 ASPIRIN LONG-TERM USE: ICD-10-CM

## 2022-06-14 LAB
AST SERPL W P-5'-P-CCNC: 27 U/L (ref 0–45)
BASOPHILS # BLD AUTO: 0.1 10E3/UL (ref 0–0.2)
BASOPHILS NFR BLD AUTO: 1 %
BUN SERPL-MCNC: 15 MG/DL (ref 7–30)
CREAT SERPL-MCNC: 0.74 MG/DL (ref 0.66–1.25)
CRP SERPL-MCNC: 30.6 MG/L (ref 0–8)
EOSINOPHIL # BLD AUTO: 0.2 10E3/UL (ref 0–0.7)
EOSINOPHIL NFR BLD AUTO: 3 %
ERYTHROCYTE [DISTWIDTH] IN BLOOD BY AUTOMATED COUNT: 13.7 % (ref 10–15)
GFR SERPL CREATININE-BSD FRML MDRD: >90 ML/MIN/1.73M2
HCT VFR BLD AUTO: 35.1 % (ref 40–53)
HGB BLD-MCNC: 11.6 G/DL (ref 13.3–17.7)
IMM GRANULOCYTES # BLD: 0 10E3/UL
IMM GRANULOCYTES NFR BLD: 0 %
LYMPHOCYTES # BLD AUTO: 1.5 10E3/UL (ref 0.8–5.3)
LYMPHOCYTES NFR BLD AUTO: 23 %
MCH RBC QN AUTO: 29.9 PG (ref 26.5–33)
MCHC RBC AUTO-ENTMCNC: 33 G/DL (ref 31.5–36.5)
MCV RBC AUTO: 91 FL (ref 78–100)
MONOCYTES # BLD AUTO: 0.5 10E3/UL (ref 0–1.3)
MONOCYTES NFR BLD AUTO: 7 %
NEUTROPHILS # BLD AUTO: 4.2 10E3/UL (ref 1.6–8.3)
NEUTROPHILS NFR BLD AUTO: 66 %
NRBC # BLD AUTO: 0 10E3/UL
NRBC BLD AUTO-RTO: 0 /100
PLATELET # BLD AUTO: 171 10E3/UL (ref 150–450)
RBC # BLD AUTO: 3.88 10E6/UL (ref 4.4–5.9)
WBC # BLD AUTO: 6.5 10E3/UL (ref 4–11)

## 2022-06-14 PROCEDURE — 84520 ASSAY OF UREA NITROGEN: CPT | Performed by: INTERNAL MEDICINE

## 2022-06-14 PROCEDURE — 82565 ASSAY OF CREATININE: CPT | Performed by: INTERNAL MEDICINE

## 2022-06-14 PROCEDURE — 99606 MTMS BY PHARM EST 15 MIN: CPT | Performed by: PHARMACIST

## 2022-06-14 PROCEDURE — 85025 COMPLETE CBC W/AUTO DIFF WBC: CPT | Performed by: INTERNAL MEDICINE

## 2022-06-14 PROCEDURE — 86140 C-REACTIVE PROTEIN: CPT | Performed by: INTERNAL MEDICINE

## 2022-06-14 PROCEDURE — 99607 MTMS BY PHARM ADDL 15 MIN: CPT | Performed by: PHARMACIST

## 2022-06-14 PROCEDURE — 84450 TRANSFERASE (AST) (SGOT): CPT | Performed by: INTERNAL MEDICINE

## 2022-06-14 NOTE — PROGRESS NOTES
Medication Therapy Management (MTM) Encounter    ASSESSMENT:                            Medication Adherence/Access: No issues identified    Sepsis/Septic Shock: Plan in place.    Hyponatremia/Dysphagia: Plan in place, would advise against crushing Mucinex tablets as these are extended release.  May benefit from using guaifenesin liquid instead.  Will benefit from contacting DME team re: nebulizer.    History of traumatic brain injury/Recurrent seizures: Stable.    GERD: Stable.     Prevent MI/Stroke:  Stable.    Hypothyroidism: Stable. Last TSH is below normal range. However, last T4 was WNL, no changes needed.     Secondary adrenal insufficiency/Hypogonadism:  Stable.    Pain:  Stable.    Supplements:  Stable.    PLAN:                            1.  Recommended switching from Mucinex to guaifenesin liquid  2.  Refill request sent to team for mupirocin and testosterone  3.  Savannah will contact DME team regarding nebulizer.    Follow-up: Return in about 3 months (around 9/14/2022) for Follow-up Medication Review.    SUBJECTIVE/OBJECTIVE:                          Keyon Farias is a 61 year old male called for a transitions of care visit. He was discharged from [facility] on 6/8/2022 for Strep salivarius group bacteremia, UTI, septic shock, acute hypoxic respiratory failure, lactic acidosis and history of recurrent aspiration pneumonia with acute hypoxic respiratory failure and sepsis.  His mother and guardian, Savannah, spoke on his behalf today (patient is non-verbal).  Keyon has now established care with Dr. Queen and plans to continue care with her moving forward.    Reason for visit: Transitions of care.  Follow-up from MTM visit with my colleague Naveed Ortiz, PharmD, BCACP on 5/19/22.    Tobacco: He reports that he quit smoking about 33 years ago. He has never used smokeless tobacco.  Alcohol: none    Medication Adherence/Access: no issues reported.  Medications are administered via  "G-tube    Sepsis/Septic Shock:  Patient was admitted 5/26/22-6/8/22 and was discharged to home.  Savannah reports that hs's doing well following discharge, no questions or concerns today.  He has homecare, they were on site at the time of my call.  Keyon is receiving ceftriaxone 2g daily x20 days and is also using chlorhexidine solution via toothbrush.  Savannah reports this is going well.  No side effects reported.    Hyponatremia/Dysphagia: Current medications include sodium bicarbonate 650 mg twice daily and does take potassium/sodium phosphates packet four times daily (although prescribed for three times daily, they pay out of pocket for these). Keyon also receivesacetylcystine 20% nebs four times daily along with albuterol nebs, but his nebulizer isn't working correctly so he hasn't had this for a few days.  When he's \"rattly\" - Savannah uses scopolamine powder and/or Mucinex.  She's currently crushing Mucinex tablets to administer.  Keyon does work closely with pulmonology.  No side effects reported.  Savannah reports Keyon's breathing has been stable, even without nebs for the last few days.  Savannah reports labs are being drawn by homecare today - unclear where orders came from/what was being drawn.    For G-tube care, Savannah uses bacitracin, mupirocin, or hydrocortisone as needed.  She reports this is effective.  She requests a refill of mupirocin ointment.  Sodium   Date Value Ref Range Status   06/08/2022 131 (L) 133 - 144 mmol/L Final   05/24/2021 143 133 - 144 mmol/L Final      Lab Results   Component Value Date    PHOS 2.4 (L) 06/08/2022      History of traumatic brain injury/Recurrent seizures: Patient is taking carbamazepine 150 mg three times daily (0600, 1200, 2400)/carbamazepine 100 mg once daily at 1800 and brivaracetam 100 mg twice daily at 0900/2100. Denies any recent episodes.     GERD: Current medications include: Protonix (pantoprazole) 40 mg once daily and metoclopramide three times daily " (although prescribed for four times daily). Savannah reports no current symptoms and indicates current regimen is effective.    Prevent MI/Stroke:  Patient takes aspirin 81mg daily.  Per review of Epic chart - patient has history of unspecified cerebral artery occlusion with cerebral infarction in 1989.  No signs and symptoms bruising/bleeding noted during visit today.    Hypothyroidism: Patient is taking levothyroxine 150 mcg daily. Patient is having the following symptoms: none.   TSH   Date Value Ref Range Status   05/09/2022 <0.01 (L) 0.40 - 4.00 mU/L Final   07/05/2018 <0.01 (L) 0.40 - 4.00 mU/L Final      T4 Free   Date Value Ref Range Status   04/22/2021 1.40 0.76 - 1.46 ng/dL Final     Free T4   Date Value Ref Range Status   05/09/2022 1.42 0.76 - 1.46 ng/dL Final      Secondary adrenal insufficiency/Hypogonadism: Patient is receiving hydrocortisone 15 mg every morning/5 mg every afternoonand testosterone cypionate 60 mg every 7 days. Denies any known issues.  Savannah requests refill of testosterone.    Pain:  Keyon has acetaminophen available for use, but is not currently needing to use.  No side effects reported when needed.     Supplements: Patient is receive vitamin C 1,000 mg daily, vitamin D3 2,000 units daily, a daily multivitamin, and calcium carbonate three times daily. Denies any current issues.     Today's Vitals: There were no vitals taken for this visit.  ----------------  Post Discharge Medication Reconciliation Status: discharge medications reconciled and changed, per note/orders.    I spent 23 minutes with this patient today. A copy of the visit note was provided to the patient's provider(s).    The patient declined a summary of these recommendations.     Julia Charles, PharmD, BCACP  Medication Therapy Management Provider  Pager: [phone]     Telemedicine Visit Details  Type of service:  Telephone visit  Start Time: 2:00 PM  End Time: 2:23 PM  Originating Location (patient location):  Home  Distant Location (provider location):  [facility]     Medication Therapy Recommendations  Aspiration pneumonitis (H)    Rationale: Incorrect administration - Adverse medication event - Safety   Recommendation: Change Medication Formulation  - guaiFENesin 100 MG/5ML liquid   Status: Accepted - no CPA Needed

## 2022-06-15 ENCOUNTER — HOME INFUSION (PRE-WILLOW HOME INFUSION) (OUTPATIENT)
Dept: PHARMACY | Facility: CLINIC | Age: 62
End: 2022-06-15

## 2022-06-15 NOTE — TELEPHONE ENCOUNTER
Patient requests refills for mupirocin ointment and testosterone injections.  Rx's pended.    Justin HortonD, [facility]  Medication Therapy Management Provider  Pager: [phone]

## 2022-06-15 NOTE — PATIENT INSTRUCTIONS
"Recommendations from today's MTM visit:                                                    MTM (medication therapy management) is a service provided by a clinical pharmacist designed to help you get the most of out of your medicines.   Today we reviewed what your medicines are for, how to know if they are working, that your medicines are safe and how to make your medicine regimen as easy as possible.      1.  Recommended switching from Mucinex to guaifenesin liquid  2.  Refill request sent to team for mupirocin and testosterone  3.  Savannah will contact DME team regarding nebulizer.    Follow-up: Return in about 3 months (around 9/14/2022) for Follow-up Medication Review.    It was great speaking with you today.  I value your experience and would be very thankful for your time in providing feedback in our clinic survey. In the next few days, you may receive an email or text message from [employer] with a link to a survey related to your  clinical pharmacist.\"     To schedule another MTM appointment, please call the clinic directly or you may call the MTM scheduling line at [phone] or toll-free at [phone].     My Clinical Pharmacist's contact information:                                                      Please feel free to contact me with any questions or concerns you have.      Julia Charles PharmD, [facility]  Medication Therapy Management Provider  Pager: [phone]     Radha Wilkinson PharmD  Medication Therapy Management Resident  Pager: [phone]    "

## 2022-06-16 ENCOUNTER — PATIENT OUTREACH (OUTPATIENT)
Dept: NURSING | Facility: CLINIC | Age: 62
End: 2022-06-16
Payer: MEDICARE

## 2022-06-16 ENCOUNTER — TELEPHONE (OUTPATIENT)
Dept: NURSING | Facility: CLINIC | Age: 62
End: 2022-06-16

## 2022-06-16 ENCOUNTER — HOSPITAL ENCOUNTER (INPATIENT)
Facility: CLINIC | Age: 62
LOS: 14 days | Discharge: HOME | DRG: 177 | End: 2022-07-01
Attending: EMERGENCY MEDICINE | Admitting: INTERNAL MEDICINE
Payer: MEDICARE

## 2022-06-16 ENCOUNTER — APPOINTMENT (OUTPATIENT)
Dept: GENERAL RADIOLOGY | Facility: CLINIC | Age: 62
DRG: 177 | End: 2022-06-16
Attending: EMERGENCY MEDICINE
Payer: MEDICARE

## 2022-06-16 DIAGNOSIS — R65.20 SEVERE SEPSIS (H): ICD-10-CM

## 2022-06-16 DIAGNOSIS — I25.10 CORONARY ARTERY DISEASE INVOLVING NATIVE CORONARY ARTERY OF NATIVE HEART WITHOUT ANGINA PECTORIS: ICD-10-CM

## 2022-06-16 DIAGNOSIS — N39.0 URINARY TRACT INFECTION IN MALE: ICD-10-CM

## 2022-06-16 DIAGNOSIS — Z87.898 HISTORY OF BACTEREMIA: ICD-10-CM

## 2022-06-16 DIAGNOSIS — A41.9 SEVERE SEPSIS (H): ICD-10-CM

## 2022-06-16 DIAGNOSIS — U07.1 INFECTION DUE TO 2019 NOVEL CORONAVIRUS: ICD-10-CM

## 2022-06-16 DIAGNOSIS — A41.9 SEPSIS, DUE TO UNSPECIFIED ORGANISM, UNSPECIFIED WHETHER ACUTE ORGAN DYSFUNCTION PRESENT (H): Primary | ICD-10-CM

## 2022-06-16 DIAGNOSIS — R65.21 SEPTIC SHOCK (H): ICD-10-CM

## 2022-06-16 DIAGNOSIS — A41.9 SEPTIC SHOCK (H): ICD-10-CM

## 2022-06-16 LAB
ALBUMIN SERPL-MCNC: 3.2 G/DL (ref 3.4–5)
ALBUMIN UR-MCNC: 20 MG/DL
ALP SERPL-CCNC: 126 U/L (ref 40–150)
ALT SERPL W P-5'-P-CCNC: 44 U/L (ref 0–70)
ANION GAP SERPL CALCULATED.3IONS-SCNC: 5 MMOL/L (ref 3–14)
APPEARANCE UR: CLEAR
AST SERPL W P-5'-P-CCNC: 34 U/L (ref 0–45)
BASOPHILS # BLD AUTO: 0.1 10E3/UL (ref 0–0.2)
BASOPHILS NFR BLD AUTO: 1 %
BILIRUB DIRECT SERPL-MCNC: 0.1 MG/DL (ref 0–0.2)
BILIRUB SERPL-MCNC: 0.2 MG/DL (ref 0.2–1.3)
BILIRUB UR QL STRIP: NEGATIVE
BUN SERPL-MCNC: 17 MG/DL (ref 7–30)
CALCIUM SERPL-MCNC: 8.2 MG/DL (ref 8.5–10.1)
CHLORIDE BLD-SCNC: 97 MMOL/L (ref 94–109)
CO2 SERPL-SCNC: 27 MMOL/L (ref 20–32)
COLOR UR AUTO: YELLOW
CREAT SERPL-MCNC: 0.82 MG/DL (ref 0.66–1.25)
EOSINOPHIL # BLD AUTO: 0.2 10E3/UL (ref 0–0.7)
EOSINOPHIL NFR BLD AUTO: 3 %
ERYTHROCYTE [DISTWIDTH] IN BLOOD BY AUTOMATED COUNT: 13.5 % (ref 10–15)
FLUAV RNA SPEC QL NAA+PROBE: NEGATIVE
FLUBV RNA RESP QL NAA+PROBE: NEGATIVE
GFR SERPL CREATININE-BSD FRML MDRD: >90 ML/MIN/1.73M2
GLUCOSE BLD-MCNC: 96 MG/DL (ref 70–99)
GLUCOSE UR STRIP-MCNC: NEGATIVE MG/DL
HCO3 BLDV-SCNC: 30 MMOL/L (ref 21–28)
HCT VFR BLD AUTO: 38.1 % (ref 40–53)
HGB BLD-MCNC: 12.6 G/DL (ref 13.3–17.7)
HGB UR QL STRIP: NEGATIVE
IMM GRANULOCYTES # BLD: 0 10E3/UL
IMM GRANULOCYTES NFR BLD: 0 %
INR PPP: 1.22 (ref 0.85–1.15)
KETONES UR STRIP-MCNC: NEGATIVE MG/DL
LACTATE BLD-SCNC: 1.4 MMOL/L
LACTATE SERPL-SCNC: 2.4 MMOL/L (ref 0.7–2)
LEUKOCYTE ESTERASE UR QL STRIP: NEGATIVE
LYMPHOCYTES # BLD AUTO: 0.5 10E3/UL (ref 0.8–5.3)
LYMPHOCYTES NFR BLD AUTO: 6 %
MCH RBC QN AUTO: 30 PG (ref 26.5–33)
MCHC RBC AUTO-ENTMCNC: 33.1 G/DL (ref 31.5–36.5)
MCV RBC AUTO: 91 FL (ref 78–100)
MONOCYTES # BLD AUTO: 0.7 10E3/UL (ref 0–1.3)
MONOCYTES NFR BLD AUTO: 9 %
MUCOUS THREADS #/AREA URNS LPF: PRESENT /LPF
NEUTROPHILS # BLD AUTO: 5.9 10E3/UL (ref 1.6–8.3)
NEUTROPHILS NFR BLD AUTO: 81 %
NITRATE UR QL: NEGATIVE
NRBC # BLD AUTO: 0 10E3/UL
NRBC BLD AUTO-RTO: 0 /100
PCO2 BLDV: 50 MM HG (ref 40–50)
PH BLDV: 7.4 [PH] (ref 7.32–7.43)
PH UR STRIP: 7 [PH] (ref 5–7)
PLAT MORPH BLD: NORMAL
PLATELET # BLD AUTO: 154 10E3/UL (ref 150–450)
PO2 BLDV: 27 MM HG (ref 25–47)
POTASSIUM BLD-SCNC: 4.2 MMOL/L (ref 3.4–5.3)
PROT SERPL-MCNC: 7.6 G/DL (ref 6.8–8.8)
RBC # BLD AUTO: 4.2 10E6/UL (ref 4.4–5.9)
RBC MORPH BLD: NORMAL
RBC URINE: 0 /HPF
RSV RNA SPEC NAA+PROBE: NEGATIVE
SAO2 % BLDV: 49 % (ref 94–100)
SARS-COV-2 RNA RESP QL NAA+PROBE: POSITIVE
SODIUM SERPL-SCNC: 129 MMOL/L (ref 133–144)
SP GR UR STRIP: 1.03 (ref 1–1.03)
UROBILINOGEN UR STRIP-MCNC: NORMAL MG/DL
WBC # BLD AUTO: 7.3 10E3/UL (ref 4–11)
WBC URINE: 0 /HPF

## 2022-06-16 PROCEDURE — 96375 TX/PRO/DX INJ NEW DRUG ADDON: CPT

## 2022-06-16 PROCEDURE — 85025 COMPLETE CBC W/AUTO DIFF WBC: CPT | Performed by: EMERGENCY MEDICINE

## 2022-06-16 PROCEDURE — 96361 HYDRATE IV INFUSION ADD-ON: CPT

## 2022-06-16 PROCEDURE — C9803 HOPD COVID-19 SPEC COLLECT: HCPCS

## 2022-06-16 PROCEDURE — 36415 COLL VENOUS BLD VENIPUNCTURE: CPT | Performed by: EMERGENCY MEDICINE

## 2022-06-16 PROCEDURE — 71046 X-RAY EXAM CHEST 2 VIEWS: CPT

## 2022-06-16 PROCEDURE — 250N000013 HC RX MED GY IP 250 OP 250 PS 637: Performed by: EMERGENCY MEDICINE

## 2022-06-16 PROCEDURE — 258N000003 HC RX IP 258 OP 636: Performed by: EMERGENCY MEDICINE

## 2022-06-16 PROCEDURE — 82248 BILIRUBIN DIRECT: CPT | Performed by: EMERGENCY MEDICINE

## 2022-06-16 PROCEDURE — 83605 ASSAY OF LACTIC ACID: CPT | Performed by: EMERGENCY MEDICINE

## 2022-06-16 PROCEDURE — 85610 PROTHROMBIN TIME: CPT | Performed by: EMERGENCY MEDICINE

## 2022-06-16 PROCEDURE — 86140 C-REACTIVE PROTEIN: CPT | Performed by: INTERNAL MEDICINE

## 2022-06-16 PROCEDURE — 85379 FIBRIN DEGRADATION QUANT: CPT | Performed by: INTERNAL MEDICINE

## 2022-06-16 PROCEDURE — 87040 BLOOD CULTURE FOR BACTERIA: CPT | Performed by: EMERGENCY MEDICINE

## 2022-06-16 PROCEDURE — 82310 ASSAY OF CALCIUM: CPT | Performed by: EMERGENCY MEDICINE

## 2022-06-16 PROCEDURE — 82803 BLOOD GASES ANY COMBINATION: CPT

## 2022-06-16 PROCEDURE — 87637 SARSCOV2&INF A&B&RSV AMP PRB: CPT | Performed by: EMERGENCY MEDICINE

## 2022-06-16 PROCEDURE — 81003 URINALYSIS AUTO W/O SCOPE: CPT | Performed by: EMERGENCY MEDICINE

## 2022-06-16 PROCEDURE — 99285 EMERGENCY DEPT VISIT HI MDM: CPT | Mod: CS,25

## 2022-06-16 RX ORDER — ACETAMINOPHEN 650 MG/1
650 SUPPOSITORY RECTAL ONCE
Status: COMPLETED | OUTPATIENT
Start: 2022-06-16 | End: 2022-06-16

## 2022-06-16 RX ADMIN — SODIUM CHLORIDE 1000 ML: 9 INJECTION, SOLUTION INTRAVENOUS at 23:03

## 2022-06-16 RX ADMIN — ACETAMINOPHEN 650 MG: 650 SUPPOSITORY RECTAL at 22:30

## 2022-06-16 ASSESSMENT — ACTIVITIES OF DAILY LIVING (ADL)
DEPENDENT_IADLS:: MONEY MANAGEMENT;MEDICATION MANAGEMENT;MEAL PREPARATION;SHOPPING;LAUNDRY;COOKING;CLEANING;TRANSPORTATION

## 2022-06-16 NOTE — PROGRESS NOTES
Clinic Care Coordination Contact    Clinic Care Coordination Contact  OUTREACH    Referral Information:  Referral Source: IP Handoff    Primary Diagnosis: Psychosocial    Chief Complaint   Patient presents with     Clinic Care Coordination - Initial        Universal Utilization:   Clinic Utilization  Difficulty keeping appointments:: No  Compliance Concerns: No  No-Show Concerns: No  No PCP office visit in Past Year: No  Utilization    Hospital Admissions  10             ED Visits  11             No Show Count (past year)  0                Current as of: 6/15/2022  7:52 PM            Clinical Concerns:  CC SW spoke with pt's mother regarding pt's overall wellbeing.     Currently, pt is working with Home Care. Right now he has nursing care but she would like this to be ongoing. Pt has a midline and the nurse with be coming out for this. She shared that in the past he had nurses that would come for 12 hour shifts. They would give him medication, get him dressed in the morning, and they would do exercises.    Pt has a waiver. Lakiaviolet Brady is the . He currently gets 8 hours/week to help with cleaning. He has one PCA 12 hours/week to spend time with pt and help light housework. Mom is also paid as a PCA for pt. He has a 3rd PCA that is currently not coming because he is getting ready surgery. Overall, pt gets a lot of hours for support but it is complicated for pt's mom in coordinating all of this care.    Mom organizes pt's medications and this is going well. She is hoping to get a handicap van. She will pay for the van herself and the waiver will pay for the handicap equipment.     Discussed transportation. Pt's mother takes pt's to appointments. Mother is not able to transfer pt to the car. A PCA has been able to assist with this.     Pt used to be a part of [facility]. This was very helpful but due to COVID and staffing, there is a long waitlist to get pt back into the program.    Current  Medical Concerns:  Complex    Current Behavioral Concerns: none  Education Provided to patient: CC role   Pain  Pain (GOAL):: No  Health Maintenance Reviewed: Due/Overdue   Clinical Pathway: None    Medication Management:  Did not discuss, pt saw PCP and MTM this week.    Functional Status:  Dependent ADLs:: Wheelchair-with assist  Dependent IADLs:: Money Management, Medication Management, Meal Preparation, Shopping, Laundry, Cooking, Cleaning, Transportation  Bed or wheelchair confined:: Yes  Mobility Status: Dependent/Assisted by Another  Fallen 2 or more times in the past year?: No  Any fall with injury in the past year?: No    Living Situation:  Current living arrangement:: I live in a private home with family  Type of residence:: Private home - stairs    Lifestyle & Psychosocial Needs:    Social Determinants of Health     Tobacco Use: Medium Risk     Smoking Tobacco Use: Former Smoker     Smokeless Tobacco Use: Never Used   Alcohol Use: Not on file   Financial Resource Strain: Not on file   Food Insecurity: Not on file   Transportation Needs: Not on file   Physical Activity: Not on file   Stress: Not on file   Social Connections: Not on file   Intimate Partner Violence: Not on file   Depression: Not at risk     PHQ-2 Score: 0   Housing Stability: Not on file     Diet:: Regular  Inadequate nutrition (GOAL):: No  Tube Feeding: No  Inadequate activity/exercise (GOAL):: No  Significant changes in sleep pattern (GOAL): No  Transportation means:: Family     Restorationist or spiritual beliefs that impact treatment:: No  Mental health DX:: No  Mental health management concern (GOAL):: No  Chemical Dependency Status: No Current Concerns  Informal Support system:: Family, Parent      Resources and Interventions:  Current Resources:   Skilled Home Care Services: Skilled Nursing, Physicial Therapy  Community Resources: PCA     Equipment Currently Used at Home: none  Employment Status: disabled         Advance Care  Plan/Directive  Advanced Care Plans/Directives on file:: No    Referrals Placed: None    Patient/Caregiver understanding: Pt reports understanding and denies any additional questions or concerns at this times. SW CC engaged in [employer] communication during encounter.       Future Appointments              In 1 month Pola Ppoe MD [facility]        Plan: At this time, pt denies outstanding need for connection or referral to resources or assistance navigating recommended follow up care. No further outreaches will be made at this time unless a new referral is made or a change in the pt's status occurs. Patient was provided with CC SW contact information and encouraged to call with any questions or concerns.    Lorelei Mantilla [facility]  Clinic Care Coordinator  [facility]  [phone]  [email or web address]

## 2022-06-16 NOTE — LETTER
[facility] CARE COORDINATION  [address] [place] [address]    June 16, 2022    Keyon Farias  [address] [place] [address]      Dear Savannah,    I am a clinic care coordinator who works with JACOB BERGERON MD with the [facility]. I wanted to thank you for spending the time to talk with me.  Below is a description of clinic care coordination and how I can further assist you.       The clinic care coordination team is made up of a registered nurse, , financial resource worker and community health worker who understand the health care system. The goal of clinic care coordination is to help you manage your health and improve access to the health care system. Our team works alongside your provider to assist you in determining your health and social needs. We can help you obtain health care and community resources, providing you with necessary information and education. We can work with you through any barriers and develop a care plan that helps coordinate and strengthen the communication between you and your care team.    Please feel free to contact me with any questions or concerns regarding care coordination and what we can offer.      We are focused on providing you with the highest-quality healthcare experience possible.    Sincerely,     Lorelei Mantilla, [facility]  Clinic Care Coordinator  [facility]  [phone]  [email or web address]

## 2022-06-17 PROBLEM — R65.20 SEVERE SEPSIS (H): Status: ACTIVE | Noted: 2021-11-13

## 2022-06-17 PROBLEM — U07.1 INFECTION DUE TO 2019 NOVEL CORONAVIRUS: Status: ACTIVE | Noted: 2022-06-17

## 2022-06-17 PROBLEM — A41.9 SEVERE SEPSIS (H): Status: ACTIVE | Noted: 2021-11-13

## 2022-06-17 PROBLEM — Z87.898 HISTORY OF BACTEREMIA: Status: ACTIVE | Noted: 2022-06-17

## 2022-06-17 LAB
CRP SERPL-MCNC: 35.8 MG/L (ref 0–8)
D DIMER PPP FEU-MCNC: 1.49 UG/ML FEU (ref 0–0.5)
GLUCOSE BLDC GLUCOMTR-MCNC: 63 MG/DL (ref 70–99)
GLUCOSE BLDC GLUCOMTR-MCNC: 66 MG/DL (ref 70–99)
GLUCOSE BLDC GLUCOMTR-MCNC: 97 MG/DL (ref 70–99)
MAGNESIUM SERPL-MCNC: 1.7 MG/DL (ref 1.6–2.3)
PHOSPHATE SERPL-MCNC: 1.8 MG/DL (ref 2.5–4.5)

## 2022-06-17 PROCEDURE — 84100 ASSAY OF PHOSPHORUS: CPT | Performed by: INTERNAL MEDICINE

## 2022-06-17 PROCEDURE — 250N000011 HC RX IP 250 OP 636: Performed by: EMERGENCY MEDICINE

## 2022-06-17 PROCEDURE — G0463 HOSPITAL OUTPT CLINIC VISIT: HCPCS

## 2022-06-17 PROCEDURE — 82962 GLUCOSE BLOOD TEST: CPT

## 2022-06-17 PROCEDURE — 250N000011 HC RX IP 250 OP 636: Performed by: INTERNAL MEDICINE

## 2022-06-17 PROCEDURE — 96372 THER/PROPH/DIAG INJ SC/IM: CPT | Performed by: INTERNAL MEDICINE

## 2022-06-17 PROCEDURE — C9113 INJ PANTOPRAZOLE SODIUM, VIA: HCPCS | Performed by: INTERNAL MEDICINE

## 2022-06-17 PROCEDURE — 94640 AIRWAY INHALATION TREATMENT: CPT

## 2022-06-17 PROCEDURE — 258N000003 HC RX IP 258 OP 636: Performed by: EMERGENCY MEDICINE

## 2022-06-17 PROCEDURE — 96367 TX/PROPH/DG ADDL SEQ IV INF: CPT

## 2022-06-17 PROCEDURE — 96365 THER/PROPH/DIAG IV INF INIT: CPT

## 2022-06-17 PROCEDURE — G0378 HOSPITAL OBSERVATION PER HR: HCPCS

## 2022-06-17 PROCEDURE — 96361 HYDRATE IV INFUSION ADD-ON: CPT

## 2022-06-17 PROCEDURE — 36415 COLL VENOUS BLD VENIPUNCTURE: CPT | Performed by: INTERNAL MEDICINE

## 2022-06-17 PROCEDURE — 258N000003 HC RX IP 258 OP 636: Performed by: INTERNAL MEDICINE

## 2022-06-17 PROCEDURE — 96366 THER/PROPH/DIAG IV INF ADDON: CPT

## 2022-06-17 PROCEDURE — 120N000001 HC R&B MED SURG/OB

## 2022-06-17 PROCEDURE — 258N000001 HC RX 258: Performed by: INTERNAL MEDICINE

## 2022-06-17 PROCEDURE — 99223 1ST HOSP IP/OBS HIGH 75: CPT | Mod: AI | Performed by: INTERNAL MEDICINE

## 2022-06-17 PROCEDURE — 999N000157 HC STATISTIC RCP TIME EA 10 MIN

## 2022-06-17 PROCEDURE — 258N000001 HC RX 258: Performed by: HOSPITALIST

## 2022-06-17 PROCEDURE — 83735 ASSAY OF MAGNESIUM: CPT | Performed by: INTERNAL MEDICINE

## 2022-06-17 PROCEDURE — XW033E5 INTRODUCTION OF REMDESIVIR ANTI-INFECTIVE INTO PERIPHERAL VEIN, PERCUTANEOUS APPROACH, NEW TECHNOLOGY GROUP 5: ICD-10-PCS | Performed by: INTERNAL MEDICINE

## 2022-06-17 PROCEDURE — 250N000013 HC RX MED GY IP 250 OP 250 PS 637: Performed by: HOSPITALIST

## 2022-06-17 PROCEDURE — 250N000009 HC RX 250: Performed by: HOSPITALIST

## 2022-06-17 PROCEDURE — 250N000013 HC RX MED GY IP 250 OP 250 PS 637: Performed by: INTERNAL MEDICINE

## 2022-06-17 PROCEDURE — 96368 THER/DIAG CONCURRENT INF: CPT

## 2022-06-17 PROCEDURE — 96375 TX/PRO/DX INJ NEW DRUG ADDON: CPT

## 2022-06-17 RX ORDER — DEXTROSE MONOHYDRATE, SODIUM CHLORIDE, AND POTASSIUM CHLORIDE 50; 1.49; 4.5 G/1000ML; G/1000ML; G/1000ML
INJECTION, SOLUTION INTRAVENOUS CONTINUOUS
Status: DISCONTINUED | OUTPATIENT
Start: 2022-06-17 | End: 2022-06-17

## 2022-06-17 RX ORDER — DEXTROSE MONOHYDRATE 25 G/50ML
25 INJECTION, SOLUTION INTRAVENOUS ONCE
Status: COMPLETED | OUTPATIENT
Start: 2022-06-17 | End: 2022-06-17

## 2022-06-17 RX ORDER — METOCLOPRAMIDE HYDROCHLORIDE 5 MG/5ML
10 SOLUTION ORAL
Status: DISCONTINUED | OUTPATIENT
Start: 2022-06-17 | End: 2022-06-18

## 2022-06-17 RX ORDER — CARBAMAZEPINE 100 MG/5ML
150 SUSPENSION ORAL 3 TIMES DAILY
Status: COMPLETED | OUTPATIENT
Start: 2022-06-17 | End: 2022-06-17

## 2022-06-17 RX ORDER — ACETAMINOPHEN 325 MG/10.15ML
650 LIQUID ORAL EVERY 4 HOURS PRN
Status: DISCONTINUED | OUTPATIENT
Start: 2022-06-17 | End: 2022-07-01 | Stop reason: HOSPADM

## 2022-06-17 RX ORDER — CEFTRIAXONE 2 G/1
2 INJECTION, POWDER, FOR SOLUTION INTRAMUSCULAR; INTRAVENOUS EVERY 24 HOURS
Status: DISCONTINUED | OUTPATIENT
Start: 2022-06-17 | End: 2022-06-18

## 2022-06-17 RX ORDER — TESTOSTERONE CYPIONATE 200 MG/ML
60 INJECTION, SOLUTION INTRAMUSCULAR
Qty: 4 ML | Refills: 1 | Status: SHIPPED | OUTPATIENT
Start: 2022-06-17 | End: 2023-02-03

## 2022-06-17 RX ORDER — ENOXAPARIN SODIUM 100 MG/ML
40 INJECTION SUBCUTANEOUS EVERY 24 HOURS
Status: DISCONTINUED | OUTPATIENT
Start: 2022-06-17 | End: 2022-07-01 | Stop reason: HOSPADM

## 2022-06-17 RX ORDER — ASPIRIN 81 MG/1
81 TABLET, CHEWABLE ORAL DAILY
Status: DISCONTINUED | OUTPATIENT
Start: 2022-06-17 | End: 2022-07-01 | Stop reason: HOSPADM

## 2022-06-17 RX ORDER — DEXTROSE MONOHYDRATE 100 MG/ML
INJECTION, SOLUTION INTRAVENOUS CONTINUOUS PRN
Status: DISCONTINUED | OUTPATIENT
Start: 2022-06-17 | End: 2022-07-01 | Stop reason: HOSPADM

## 2022-06-17 RX ORDER — CARBAMAZEPINE 100 MG/5ML
100 SUSPENSION ORAL DAILY
Status: DISCONTINUED | OUTPATIENT
Start: 2022-06-17 | End: 2022-07-01 | Stop reason: HOSPADM

## 2022-06-17 RX ORDER — LEVOTHYROXINE SODIUM 150 UG/1
150 TABLET ORAL
Status: DISCONTINUED | OUTPATIENT
Start: 2022-06-18 | End: 2022-07-01 | Stop reason: HOSPADM

## 2022-06-17 RX ORDER — ACETYLCYSTEINE 200 MG/ML
2 SOLUTION ORAL; RESPIRATORY (INHALATION) 4 TIMES DAILY
Status: DISCONTINUED | OUTPATIENT
Start: 2022-06-17 | End: 2022-06-29

## 2022-06-17 RX ORDER — CARBAMAZEPINE 100 MG/5ML
150 SUSPENSION ORAL 3 TIMES DAILY
Status: DISCONTINUED | OUTPATIENT
Start: 2022-06-17 | End: 2022-07-01 | Stop reason: HOSPADM

## 2022-06-17 RX ORDER — ACETAMINOPHEN 500 MG
1000 TABLET ORAL ONCE
Status: COMPLETED | OUTPATIENT
Start: 2022-06-17 | End: 2022-06-17

## 2022-06-17 RX ORDER — ALBUTEROL SULFATE 0.83 MG/ML
2.5 SOLUTION RESPIRATORY (INHALATION)
Status: DISCONTINUED | OUTPATIENT
Start: 2022-06-17 | End: 2022-06-19 | Stop reason: DRUGHIGH

## 2022-06-17 RX ORDER — CALCIUM CARBONATE 1250 MG/5ML
1250 SUSPENSION ORAL 3 TIMES DAILY
Status: DISCONTINUED | OUTPATIENT
Start: 2022-06-17 | End: 2022-07-01 | Stop reason: HOSPADM

## 2022-06-17 RX ORDER — LIDOCAINE 40 MG/G
CREAM TOPICAL
Status: DISCONTINUED | OUTPATIENT
Start: 2022-06-17 | End: 2022-07-01 | Stop reason: HOSPADM

## 2022-06-17 RX ORDER — HYDROCORTISONE 5 MG/1
5 TABLET ORAL DAILY
Status: DISCONTINUED | OUTPATIENT
Start: 2022-06-17 | End: 2022-06-18

## 2022-06-17 RX ORDER — MUPIROCIN 20 MG/G
OINTMENT TOPICAL 2 TIMES DAILY PRN
Qty: 30 G | Refills: 1 | Status: SHIPPED | OUTPATIENT
Start: 2022-06-17 | End: 2023-07-13

## 2022-06-17 RX ORDER — SODIUM BICARBONATE 650 MG/1
650 TABLET ORAL 2 TIMES DAILY
Status: DISCONTINUED | OUTPATIENT
Start: 2022-06-17 | End: 2022-07-01 | Stop reason: HOSPADM

## 2022-06-17 RX ADMIN — CARBAMAZEPINE 150 MG: 100 SUSPENSION ORAL at 06:18

## 2022-06-17 RX ADMIN — SODIUM CHLORIDE 50 ML: 9 INJECTION, SOLUTION INTRAVENOUS at 04:59

## 2022-06-17 RX ADMIN — GUAIFENESIN 10 ML: 200 SOLUTION ORAL at 19:02

## 2022-06-17 RX ADMIN — HYDROCORTISONE 5 MG: 5 TABLET ORAL at 19:02

## 2022-06-17 RX ADMIN — ACETAMINOPHEN 1000 MG: 500 TABLET, FILM COATED ORAL at 06:18

## 2022-06-17 RX ADMIN — DEXTROSE MONOHYDRATE 1000 ML: 100 INJECTION, SOLUTION INTRAVENOUS at 13:19

## 2022-06-17 RX ADMIN — POTASSIUM & SODIUM PHOSPHATES POWDER PACK 280-160-250 MG 1 PACKET: 280-160-250 PACK at 17:04

## 2022-06-17 RX ADMIN — DEXTROSE AND SODIUM CHLORIDE: 5; 900 INJECTION, SOLUTION INTRAVENOUS at 23:49

## 2022-06-17 RX ADMIN — HYDROCORTISONE 15 MG: 10 TABLET ORAL at 09:20

## 2022-06-17 RX ADMIN — DEXTROSE AND SODIUM CHLORIDE: 5; 900 INJECTION, SOLUTION INTRAVENOUS at 14:23

## 2022-06-17 RX ADMIN — ASPIRIN 81 MG CHEWABLE TABLET 81 MG: 81 TABLET CHEWABLE at 09:20

## 2022-06-17 RX ADMIN — CEFTRIAXONE SODIUM 2 G: 2 INJECTION, POWDER, FOR SOLUTION INTRAMUSCULAR; INTRAVENOUS at 09:20

## 2022-06-17 RX ADMIN — PANTOPRAZOLE SODIUM 40 MG: 40 INJECTION, POWDER, FOR SOLUTION INTRAVENOUS at 09:20

## 2022-06-17 RX ADMIN — CARBAMAZEPINE 150 MG: 100 SUSPENSION ORAL at 19:01

## 2022-06-17 RX ADMIN — SODIUM BICARBONATE 650 MG TABLET 650 MG: at 21:10

## 2022-06-17 RX ADMIN — CALCIUM CARBONATE 1250 MG: 1250 SUSPENSION ORAL at 21:10

## 2022-06-17 RX ADMIN — SODIUM CHLORIDE 1000 ML: 9 INJECTION, SOLUTION INTRAVENOUS at 00:08

## 2022-06-17 RX ADMIN — POTASSIUM CHLORIDE, DEXTROSE MONOHYDRATE AND SODIUM CHLORIDE: 150; 5; 450 INJECTION, SOLUTION INTRAVENOUS at 04:20

## 2022-06-17 RX ADMIN — DEXTROSE MONOHYDRATE 25 ML: 25 INJECTION, SOLUTION INTRAVENOUS at 14:06

## 2022-06-17 RX ADMIN — BRIVARACETAM 100 MG: 10 SOLUTION ORAL at 09:21

## 2022-06-17 RX ADMIN — REMDESIVIR 200 MG: 100 INJECTION, POWDER, LYOPHILIZED, FOR SOLUTION INTRAVENOUS at 04:20

## 2022-06-17 RX ADMIN — BRIVARACETAM 100 MG: 10 SOLUTION ORAL at 21:09

## 2022-06-17 RX ADMIN — DEXTROSE AND SODIUM CHLORIDE: 5; 900 INJECTION, SOLUTION INTRAVENOUS at 06:18

## 2022-06-17 RX ADMIN — ALBUTEROL SULFATE 2.5 MG: 2.5 SOLUTION RESPIRATORY (INHALATION) at 20:42

## 2022-06-17 RX ADMIN — VANCOMYCIN HYDROCHLORIDE 1750 MG: 5 INJECTION, POWDER, LYOPHILIZED, FOR SOLUTION INTRAVENOUS at 00:40

## 2022-06-17 RX ADMIN — POTASSIUM & SODIUM PHOSPHATES POWDER PACK 280-160-250 MG 1 PACKET: 280-160-250 PACK at 21:10

## 2022-06-17 RX ADMIN — MEROPENEM 2 G: 1 INJECTION, POWDER, FOR SOLUTION INTRAVENOUS at 00:07

## 2022-06-17 RX ADMIN — ACETAMINOPHEN 650 MG: 325 SUSPENSION ORAL at 17:04

## 2022-06-17 RX ADMIN — CALCIUM CARBONATE 1250 MG: 1250 SUSPENSION ORAL at 19:02

## 2022-06-17 RX ADMIN — CARBAMAZEPINE 150 MG: 100 SUSPENSION ORAL at 23:49

## 2022-06-17 RX ADMIN — CARBAMAZEPINE 100 MG: 100 SUSPENSION ORAL at 19:01

## 2022-06-17 RX ADMIN — ENOXAPARIN SODIUM 40 MG: 40 INJECTION SUBCUTANEOUS at 09:20

## 2022-06-17 RX ADMIN — ACETYLCYSTEINE 2 ML: 200 SOLUTION ORAL; RESPIRATORY (INHALATION) at 20:34

## 2022-06-17 RX ADMIN — METOCLOPRAMIDE HYDROCHLORIDE 10 MG: 5 SOLUTION ORAL at 19:02

## 2022-06-17 ASSESSMENT — ACTIVITIES OF DAILY LIVING (ADL)
FALL_HISTORY_WITHIN_LAST_SIX_MONTHS: NO
DIFFICULTY_COMMUNICATING: YES
DIFFICULTY_EATING/SWALLOWING: YES
CHANGE_IN_FUNCTIONAL_STATUS_SINCE_ONSET_OF_CURRENT_ILLNESS/INJURY: NO
DRESSING/BATHING_DIFFICULTY: YES
HEARING_DIFFICULTY_OR_DEAF: NO
DRESSING/BATHING: DRESSING DIFFICULTY, DEPENDENT;BATHING DIFFICULTY, DEPENDENT
TOILETING_ASSISTANCE: TOILETING DIFFICULTY, ASSISTANCE 1 PERSON
ADLS_ACUITY_SCORE: 63
TOILETING_ISSUES: YES
ADLS_ACUITY_SCORE: 63
ADLS_ACUITY_SCORE: 63
CONCENTRATING,_REMEMBERING_OR_MAKING_DECISIONS_DIFFICULTY: YES
EATING/SWALLOWING_MANAGEMENT: TF
WALKING_OR_CLIMBING_STAIRS: TRANSFERRING DIFFICULTY, DEPENDENT
WEAR_GLASSES_OR_BLIND: NO
EATING/SWALLOWING: SWALLOWING LIQUIDS;SWALLOWING SOLID FOOD
DOING_ERRANDS_INDEPENDENTLY_DIFFICULTY: YES
COMMUNICATION: DIFFICULTY SPEAKING
WALKING_OR_CLIMBING_STAIRS_DIFFICULTY: YES

## 2022-06-17 NOTE — ED NOTES
Bed: ED26  Expected date:   Expected time:   Means of arrival:   Comments:  Jarrett will move here

## 2022-06-17 NOTE — ED TRIAGE NOTES
Pt being treated at home for infection with antibiotics thru a picc line. Today mom/caregiver noticed a fever. Instructed to come to ER by PCP.

## 2022-06-17 NOTE — CONSULTS
"Clinical Nutrition Brief Note     Received Provider Order to start and manage TF (noted Observation status, a complete nutrition assessment note will be deferred at this time)  Patient well known to services and was last seen by the RD here on 6/8/2022   COVID positive as of 6/16/2022    Receives the following regimen at baseline~  Nutrition Support Enteral:  Type of Feeding Tube: Jejunostomy  Enteral Frequency:  22 hr cycle  Enteral Regimen: Jevity 1.5 at 60 mL/hr x 22 hours per day (holding TF 1 hour before and 1 hour after Synthroid dose)  Total Enteral Provisions: 1980 kcal (28 kcal/kg), 90 g protein (1.3 g/kg), 28 g fiber, 1003 mL H2O  Free Water Flush: typically receives 9483-6090 mL daily (160-200 mL flushes q 4 hours)    Assessed Nutrition Needs:  Dosing wt: 71 kg - from \"10 days ago\"   Estimated Energy Needs: 0495-8513 (25-30 kcal/kg)  Justification: maintenance  Estimated Protein Needs:  (1.2-1.5 g/kg)   Justification: maintenance  Estimated Fluid Needs: 1 mL/kcal  Justification: maintenance    Medications reviewed:  D5 IVF at 100 mL/hr   Levothyroxine not yet resumed    Labs reviewed: Na 129 (L), K 4.2 (NL), No Mg/Phos from admission   Recent Labs   Lab 06/16/22  2141   GLC 96        Plan:  Modify TF orders --> Run Jevity 1.5 at 60 mL/hr x22 hrs (holding 1 hr before and 1 hr after synthroid when resumed)   Add free water flush 60 mL q 4 hrs to start (increase per MD discretion given hyponatremia) - see above  Stop D5 IVF with TF running     Will continue to follow patient     Yanely Jefferson, RD, LD  Clinical Dietitian         "

## 2022-06-17 NOTE — PROGRESS NOTES
RECEIVING UNIT ED HANDOFF REVIEW    ED Nurse Handoff Report was reviewed by: Corinna Bronson RN on June 17, 2022 at 3:45 PM

## 2022-06-17 NOTE — ED PROVIDER NOTES
History   Chief Complaint:  Fever       The history is limited by the condition of the patient.      Keyon Farias is a 61 year old male with history including TBI with aphasia, R sided spastic hemiplegia and dysphagia with GJ-tube for TFs/meds; seizure disorder; panhypopituitarism; h/o multiple hospital admissions for recurrent pneumonia (last in 12/2021); He was recently hospitalized on 5/26/2022 with fever and found to have Strep salivarius group bacteremia with septic shock.    On 5/27/2022, RRT called and pt found to be in septic shock and transferred to the ICU and was intubated and started on pressors. BC's subsequently positive for Strep salivarius group, UC only with 50-100K mixed urogenital jaime. Able to be weaned off pressors 5/30/2022. Extubated 5/30/2022, transferred out of the ICU 6/1/2022.    He was discharged with a mid line in place with plan for total of 4 weeks of IV ceftriaxone. He presents to the ED today with recurrent fevers. Temp at home of 102.8. he denies recently illness, abdominal pain, cough, vomiting, and diarrhea, but does endorse shortness of breath.    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  Cardiac arrest  Acute respiratory failure with hypoxia  Necrotizing pancreatitis  Acid reflux  Seizure  Conjunctivitis of right eye  TBI (traumatic brain injury)  Septic shock  recurrent pneumonia  Leukocytosis  Kidney stones     Past Surgical History:    reconstructive facial surgery   Gastro jejunostomy tube changes  Appendectomy  tracheostomy  Right hand repair    Family History:    Mother: PE  Father: kidney cancer, hypertension    Social History:  The patient presents to the ED alone  Present by  EMS    Physical Exam     Patient Vitals for the past 24 hrs:   BP Temp Temp src Pulse Resp SpO2   06/17/22 0000 -- -- -- 108 8 --   06/16/22 2310 -- -- -- 102 18 --   06/16/22 2300 -- -- -- 104 13 --   06/16/22 2240 106/63 -- -- 107 -- --   06/16/22 2230 -- -- -- -- -- 100 %   06/16/22 2200 -- -- -- 102 (!) 32 100 %   06/16/22 2100 108/61 -- -- 94 14 97 %   06/16/22 2055 108/61 (!) 101  F (38.3  C) Oral 97 20 97 %       Physical Exam  General: Awake, alert. Nontoxic. Resting comfortably  Head:  Scalp, face, and head appear normal  Eyes:  Pupils are equal, round    Conjunctivae non-injected and sclerae white  ENT:    The external nose is normal    MMM.  Posterior pharynx is clear without erythema or exudates.    Pinnae are normal  Neck:  Normal range of motion    There is no rigidity noted    Trachea is in the midline  CV:  Regular rate and rhythm     Normal S1/S2, no S3/S4    No murmur or rub. Radial pulses 2+ bilaterally.  Resp:  Lungs are clear and equal bilaterally  There is no tachypnea    No increased work of breathing    No rales, wheezing, or rhonchi  GI:  Abdomen is soft, no rigidity or guarding    No distension, or mass.  Left mid abdominal feeding tube clean dry and intact without surrounding erythema swelling or tenderness.    No tenderness or rebound tenderness   MS:  No lower extremity edema  Skin:  No rash or acute skin lesions noted  Neuro: Awake and alert  Follows commands and answer simple questions appropriately.  No tremors or seizure.  Psych:  No agitation.      Emergency Department Course   Imaging:  XR Chest 2 Views   Final Result   IMPRESSION: No evidence of active cardiopulmonary disease.         Report per radiology    Laboratory:  Labs Ordered and Resulted from Time of ED Arrival to Time of ED Departure   BASIC METABOLIC PANEL - Abnormal       Result Value    Sodium 129 (*)     Potassium 4.2      Chloride 97      Carbon Dioxide (CO2) 27      Anion Gap 5      Urea Nitrogen 17      Creatinine  0.82      Calcium 8.2 (*)     Glucose 96      GFR Estimate >90     CBC WITH PLATELETS AND DIFFERENTIAL - Abnormal    WBC Count 7.3      RBC Count 4.20 (*)     Hemoglobin 12.6 (*)     Hematocrit 38.1 (*)     MCV 91      MCH 30.0      MCHC 33.1      RDW 13.5      Platelet Count 154      % Neutrophils 81      % Lymphocytes 6      % Monocytes 9      % Eosinophils 3      % Basophils 1      % Immature Granulocytes 0      NRBCs per 100 WBC 0      Absolute Neutrophils 5.9      Absolute Lymphocytes 0.5 (*)     Absolute Monocytes 0.7      Absolute Eosinophils 0.2      Absolute Basophils 0.1      Absolute Immature Granulocytes 0.0      Absolute NRBCs 0.0     INR - Abnormal    INR 1.22 (*)    LACTIC ACID WHOLE BLOOD - Abnormal    Lactic Acid 2.4 (*)    ROUTINE UA WITH MICROSCOPIC REFLEX TO CULTURE - Abnormal    Color Urine Yellow      Appearance Urine Clear      Glucose Urine Negative      Bilirubin Urine Negative      Ketones Urine Negative      Specific Gravity Urine 1.027      Blood Urine Negative      pH Urine 7.0      Protein Albumin Urine 20  (*)     Urobilinogen Urine Normal      Nitrite Urine Negative      Leukocyte Esterase Urine Negative      Mucus Urine Present (*)     RBC Urine 0      WBC Urine 0     INFLUENZA A/B & SARS-COV2 PCR MULTIPLEX - Abnormal    Influenza A PCR Negative      Influenza B PCR Negative      RSV PCR Negative      SARS CoV2 PCR Positive (*)    ISTAT GASES LACTATE VENOUS POCT - Abnormal    Lactic Acid POCT 1.4      Bicarbonate Venous POCT 30 (*)     O2 Sat, Venous POCT 49 (*)     pCO2V Venous POCT 50      pH Venous POCT 7.40      pO2 Venous POCT 27     HEPATIC FUNCTION PANEL - Abnormal    Bilirubin Total 0.2      Bilirubin Direct 0.1      Protein Total 7.6      Albumin 3.2 (*)     Alkaline Phosphatase 126      AST 34      ALT 44     RBC AND PLATELET MORPHOLOGY    Platelet Assessment        Value: Automated Count Confirmed. Platelet morphology is normal.    RBC Morphology Confirmed RBC Indices      CRP INFLAMMATION   D DIMER QUANTITATIVE   BLOOD CULTURE   BLOOD CULTURE        Emergency Department Course:  Reviewed:  I reviewed nursing notes, vitals, past medical history and Care Everywhere    Assessments:  2209 I obtained history and examined the patient as noted above.    I rechecked the patient     Consults:  0024 I spoke with Jerald Schafer MD of the Hospitalist service from [facility] regarding patient's presentation, findings, and plan of care.     Interventions:  2230 Tylenol, 650 mg, rectal  2303 Sodium chloride bolus, 1000 ml, IV   0007 Merrem, 2 g, IV  0008 Sodium chloride bolus, 1000 ml, IV    Disposition:  The patient was admitted to the hospital under the care of Jerald Schafer MD.     Impression & Plan   CMS Diagnoses: The Lactic acid level is elevated consistent with sever sepsis due to Covid 19 infection. empiric antibiotics given as noted above.    Medical Decision Making:  Keyon Farias is a 61 year old male with complex past medical history as noted above who presents with recurrent fevers.  Patient was recently hospitalized with septic shock due to strep salivarius group bacteremia briefly requiring intubation and pressor support.  On my evaluation he is alert and oriented to self and appears nontoxic.  He is hemodynamically stable.  Initial lactic acid 1.4.  VBG without hypercapnia or acidosis.  UA obtained and negative for UTI.  CMP with hyponatremia sodium 129, otherwise unremarkable.  CBC with stable chronic anemia no leukocytosis.  Chest x-ray negative for any consolidation or infiltrate or other acute thoracic finding.  Given the recurrent fever with recent bacteremia patient was started empirically on broad-spectrum antibiotics.  Ultimately COVID-19 test did return positive.  No hypoxia at this time.  COVID-19 is the likely source of his current fever, although recurrent bacteremia cannot be definitively excluded at this time.  Blood cultures are sent and  pending.  Patient is high risk for complications.  Given this he will be admitted to the hospital for ongoing monitoring evaluation and treatment.  Case was discussed with the hospitalist and the patient was admitted in stable condition..    Diagnosis:    ICD-10-CM    1. Infection due to 2019 novel coronavirus  U07.1    2. Severe sepsis (H)  A41.9     R65.20    3. History of bacteremia  Z87.898        Scribe Disclosure:  I, Rob Padilla, am serving as a scribe at 10:04 PM on 6/16/2022 to document services personally performed by Srikanth Ryan MD based on my observations and the provider's statements to me.          Srikanth Ryan MD  06/17/22 0541

## 2022-06-17 NOTE — ED NOTES
[facility]  ED Nurse Handoff Report    ED Chief complaint: Fever      ED Diagnosis:   Final diagnoses:   Infection due to 2019 novel coronavirus   Severe sepsis (H)   History of bacteremia       Code Status: Not addressed in ED    Allergies:   Allergies   Allergen Reactions     Valproic Acid Other (See Comments)     Toxicity w/ bone marrow suspension, elevated ammonia levels      Dilantin [Phenytoin Sodium] Other (See Comments)     Severe Trembling     Scopolamine Hives     Hives with the patch - oral no problem       Patient Story: Pt being treated for infection with IV antibiotics at home by mom, today developed a fever, sent to ER by PCP. Hx of TBI, hemiplegia, stroke, pt needs full care.  Focused Assessment:    Labs Ordered and Resulted from Time of ED Arrival to Time of ED Departure   BASIC METABOLIC PANEL - Abnormal       Result Value    Sodium 129 (*)     Potassium 4.2      Chloride 97      Carbon Dioxide (CO2) 27      Anion Gap 5      Urea Nitrogen 17      Creatinine 0.82      Calcium 8.2 (*)     Glucose 96      GFR Estimate >90     CBC WITH PLATELETS AND DIFFERENTIAL - Abnormal    WBC Count 7.3      RBC Count 4.20 (*)     Hemoglobin 12.6 (*)     Hematocrit 38.1 (*)     MCV 91      MCH 30.0      MCHC 33.1      RDW 13.5      Platelet Count 154      % Neutrophils 81      % Lymphocytes 6      % Monocytes 9      % Eosinophils 3      % Basophils 1      % Immature Granulocytes 0      NRBCs per 100 WBC 0      Absolute Neutrophils 5.9      Absolute Lymphocytes 0.5 (*)     Absolute Monocytes 0.7      Absolute Eosinophils 0.2      Absolute Basophils 0.1      Absolute Immature Granulocytes 0.0      Absolute NRBCs 0.0     LACTIC ACID WHOLE BLOOD - Abnormal    Lactic Acid 2.4 (*)    ROUTINE UA WITH MICROSCOPIC REFLEX TO CULTURE - Abnormal    Color Urine Yellow      Appearance Urine Clear      Glucose Urine Negative      Bilirubin Urine Negative      Ketones Urine Negative      Specific Gravity Urine  1.027      Blood Urine Negative      pH Urine 7.0      Protein Albumin Urine 20  (*)     Urobilinogen Urine Normal      Nitrite Urine Negative      Leukocyte Esterase Urine Negative      Mucus Urine Present (*)     RBC Urine 0      WBC Urine 0     ISTAT GASES LACTATE VENOUS POCT - Abnormal    Lactic Acid POCT 1.4      Bicarbonate Venous POCT 30 (*)     O2 Sat, Venous POCT 49 (*)     pCO2V Venous POCT 50      pH Venous POCT 7.40      pO2 Venous POCT 27     HEPATIC FUNCTION PANEL - Abnormal    Bilirubin Total 0.2      Bilirubin Direct 0.1      Protein Total 7.6      Albumin 3.2 (*)     Alkaline Phosphatase 126      AST 34      ALT 44     RBC AND PLATELET MORPHOLOGY    Platelet Assessment        Value: Automated Count Confirmed. Platelet morphology is normal.    RBC Morphology Confirmed RBC Indices     INR   INFLUENZA A/B & SARS-COV2 PCR MULTIPLEX   BLOOD CULTURE   BLOOD CULTURE     XR Chest 2 Views   Final Result   IMPRESSION: No evidence of active cardiopulmonary disease.             Treatments and/or interventions provided: NS bolus, rectal tylenol  Patient's response to treatments and/or interventions: no changes    To be done/followed up on inpatient unit:  see orders    Does this patient have any cognitive concerns?: Pt is alert per normal self. Pt is non verbal. Answers questions yes and no. Follows commands.    Activity level - Baseline/Home:  Total Care  Activity Level - Current:   Total Care    Patient's Preferred language: English   Needed?: No    Isolation: None and Contact   Infection: MRSA  VRE  Patient tested for COVID 19 prior to admission: YES  Bariatric?: No    Vital Signs:   Vitals:    06/16/22 2100 06/16/22 2230 06/16/22 2240 06/16/22 2310   BP: 108/61  106/63    Pulse: 94  107 102   Resp: 14   18   Temp:       TempSrc:       SpO2: 97% 100%         Cardiac Rhythm:     Was the PSS-3 completed:   No -non verbal  What interventions are required if any?               Family Comments:  mom/caregiver at home  OBS brochure/video discussed/provided to patient/family: N/A              Name of person given brochure if not patient:               Relationship to patient:     For the majority of the shift this patient's behavior was Green.   Behavioral interventions performed were none.    ED NURSE PHONE NUMBER: [id number]

## 2022-06-17 NOTE — TELEPHONE ENCOUNTER
[facility] triage nurse calling with concerns about;     Nurse visit today PM, temp was 97.5   Mom is now calling with 102.8   Has a midline in for antibiotic treatment that he is receiving  Urine looks normal/ is not odorous, per Mom    Home Care nurse wondering what fever parameters are for this Pt.    Page sent to [facility], Dr.Christopher Lerma who advised;    Pt needs to go in to ED.    [facility] is notified, verbalized understanding and agrees with this plan of care.    Lorena Harvey RN, Nurse Advisor 8:15 PM 6/16/2022  COVID 19 Nurse Triage Plan/Patient Instructions    Please be aware that novel coronavirus (COVID-19) may be circulating in the community. If you develop symptoms such as fever, cough, or SOB or if you have concerns about the presence of another infection including coronavirus (COVID-19), please contact your health care provider or visit [email or web address].     Disposition/Instructions    ED Visit recommended. Follow protocol based instructions.     Bring Your Own Device:  Please also bring your smart device(s) (smart phones, tablets, laptops) and their charging cables for your personal use and to communicate with your care team during your visit.    Thank you for taking steps to prevent the spread of this virus.  o Limit your contact with others.  o Wear a simple mask to cover your cough.  o Wash your hands well and often.    Resources    [facility]: About COVID-19: [email or web address]    CDC: What to Do If You're Sick: www.cdc.gov/coronavirus/2019-ncov/about/steps-when-sick.html    CDC: Ending Home Isolation: www.cdc.gov/coronavirus/2019-ncov/hcp/disposition-in-home-patients.html     CDC: Caring for Someone: www.cdc.gov/coronavirus/2019-ncov/if-you-are-sick/care-for-someone.html     MD: Interim Guidance for Hospital Discharge to Home: [email or web address].[email or web address]/diseases/coronavirus/hcp/hospdischarge.pdf    [facility] clinical trials  (COVID-19 research studies): [email or web address]-trials     Below are the COVID-19 hotlines at the [place] [facility] ([facility]). Interpreters are available.   o For health questions: Call 200-175-7859 or 1-997.811.6675 (7 a.m. to 7 p.m.)  o For questions about schools and childcare: Call 877-399-3511 or 1-342.698.5416 (7 a.m. to 7 p.m.)

## 2022-06-17 NOTE — PROVIDER NOTIFICATION
MD Notification    Notified Person: MD    Notified Person Name: Adriana    Notification Date/Time: 6/17/22 3250    Notification Interaction: Paged    Purpose of Notification: Pt arrived to floor with 101.7 temp. Will cold pack. Can you order PRN Tylenol? Thanks    Orders Received: Ordered    Comments:

## 2022-06-17 NOTE — CONSULTS
[facility] Nurse Inpatient Assessment     Consulted for: Coccyx     Pt has history of intermittent skin breakdown and rash to coccyx and groin area.  Coccyx currently very lightly intact with thin red blanchable tissue; no obvious active pressure injury.  Groin appears starting to develop rash.  Pt frequently incontinent of urine and stool.      Patient History (according to provider note(s):      Keyon Farias is a 61 year old male admitted on 6/16/2022. He presents to the emergency department with fever.  History of frequent aspiration pneumonia and aspiration pneumonitis events as well as recent strep bacteremia thought secondary to oral jaime resulting in septic shock and prolonged hospitalization with discharge 6/8/2022.  Found now to have a new diagnosis of COVID-19, though fortunately no hypoxia or other associated symptoms.    Areas Assessed:      Areas visualized during today's visit: Sacrum/coccyx and groin    Skin Injury Location: Coccyx    Last photo: 6-17-22      Skin injury due to: Incontinence associated dermatitis (IAD) and hx pressure injury  Skin history and plan of care:  Pt has TBI and is total cares. Incontinent of bowel/bladder.    Affected area:      Skin assessment: appears lightly intact with some peeling epidermis proximally; scar tissue in areas     Measurements (length x width x depth, in cm) roughly 6 x 6cm area         Color: pink blanchable     Temperature  normal   Pain: no grimacing or signs of discomfort    Treatment goal: Maintain (prevention of deterioration) and Protection  STATUS: initial assessment  Supplies ordered: at bedside - barrier paste      Skin Injury Location: Groin    Last photo: 6-17-22      Skin injury due to: Fungal rash , Incontinence associated dermatitis (IAD) and possible follicle irritation/ pustules to right upper groin  Skin history and plan of care: incontinent of urine and stool at assessment; pt has been boarding in ED for  past 16 hrs  Affected area:      Skin assessment: Intact, Erythema and Rash     Measurements (length x width x depth, in cm) scattered erythema, L>R     Color: pink     Temperature  normal   Pain: no grimacing or signs of discomfort    Treatment goal: Decrease moisture, Maintain (prevention of deterioration) and Protection  STATUS: initial assessment  Supplies ordered: discussed with RN - request antifungal powder order      Treatment Plan:     Perineal cares: BID and prn:  1.  Cleanse with Rita perineal lotion and soft dry wipes.  (Please use this combo instead of the blue bag wipes).   2.  Apply antifungal powder to groin folds and gluteal cleft, rub in well.  3.  Apply thin glaze of barrier paste to perianal area, gluteal cleft, inner buttocks.  4.  Leave coccyx open to air if frequently incontinent.  Minimize use of briefs, if able to contain urine with other devices.   5.  PIP measures; recommend Pulsate mattress.    Pressure Injury Prevention (PIP) Plan:      Moisture management: Perineal cleansing /protection: Follow Incontinence Protocol and Clean and dry skin folds with bathing       Mattress: low air loss recommended      HOB: Maintain at or below 30 degrees, unless contraindicated      Repositioning in bed: Every 1-2 hours , Left/right positioning; avoid supine and Raise foot of bed prior to raising head of bed, to reduce patient sliding down (shear)      Heels: Keep elevated off mattress and Pillows under calves      Protective dressing: None d/t frequent incontinence      Under devices: Inspect skin under all medical devices during skin inspection , Ensure tubes are stabilized without tension and Ensure patient is not lying on medical devices or equipment when repositioned    Orders: Written    RECOMMEND PRIMARY TEAM ORDER: Antifungal powder  Education provided: importance of repositioning, plan of care, Moisture management, Hygiene and Off-loading pressure  Discussed plan of care with: Patient and  Nurse  WOC nurse follow-up plan: weekly  Notify WOC if wound(s) deteriorate.  Nursing to notify the Provider(s) and re-consult the WOC Nurse if new skin concern.    DATA:     Current support surface: cart in ED  Containment of urine/stool: Incontinence Protocol; RN just applied PrimoFit to see if will work for pt  BMI: There is no height or weight on file to calculate BMI.   Active diet order: Orders Placed This Encounter      NPO for Medical/Clinical Reasons Except for: No Exceptions     Output: I/O last 3 completed shifts:  In: 2100 [IV Piggyback:2100]  Out: -      Labs: Recent Labs   Lab 06/16/22  2248 06/16/22  2141   ALBUMIN  --  3.2*   HGB  --  12.6*   INR 1.22*  --    WBC  --  7.3   CRP  --  35.8*     Pressure injury risk assessment: pending, pt in ED                         Kristie Norris RN   Dept. Pager: [phone]  Dept. Office Number: [phone]

## 2022-06-17 NOTE — UTILIZATION REVIEW
Admission Status; Secondary Review Determination       Under the authority of the Utilization Management Committee, the utilization review process indicated a secondary review on the above patient. The review outcome is based on review of the medical records, discussions with staff, and applying clinical experience noted on the date of the review.     (x) Inpatient Status Appropriate - This patient's medical care is consistent with medical management for inpatient care and reasonable inpatient medical practice.     RATIONALE FOR DETERMINATION      Patient requires inpatient admission versus short stay observation or outpatient treatment for the following reasons:  61-year-old man with significant comorbidities: He was discharged from the hospital 10 days ago after prolonged admission for septic shock secondary aspiration pneumonia.  He also has panhypopituitary is him secondary to TBI, chronic aspiration, chronic tube feeding, epilepsy and chronic hyponatremia.  The patient returned to the hospital on June 19 because of fever.  He was found positive for COVID-19 and also diagnosed with early pressure wound.  Due to his comorbidity and his recent admission for septic shock he was started on IV antibiotics and IV remdesivir.  He needs to finish 3 days IV remdesivir.  COVID infection increases his risk for hypercoagulability and he needs to have Lovenox injections.  The patient is tube feed dependent.  His pressure wounds needs to be monitored frequently and the patient needs to be repositioned frequently.  Because the patient is COVID-positive, he will not benefit from home health care assistance to help with his medical care.  The patient will remain in the hospital as an inpatient.    The expected length of stay at the time of admission was more than 2 nights because of the severity of illness, intensity of service provided, and risk for adverse outcome. Inpatient admission is appropriate.     Dr Wright was  notified by pager.     This document was produced using voice recognition software       The information on this document is developed by the utilization review team in order for the business office to ensure compliance. This only denotes the appropriateness of proper admission status and does not reflect the quality of care rendered.   The definitions of Inpatient Status and Observation Status used in making the determination above are those provided in the CMS Coverage Manual, Chapter 1 and Chapter 6, section 70.4.   Sincerely,   MIGUELINA GRIFFIN MD   Utilization Review  Physician Advisor  [facility]
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
## 2022-06-17 NOTE — ED NOTES
Patient repositioned and cleaned up from urine.  Wound care nurse at bedside doing exam.  Barrier cream applied to coccyx.  BG checked, 66.  Hospitalist paged.

## 2022-06-17 NOTE — PHARMACY-ADMISSION MEDICATION HISTORY
Pharmacy Medication History  Admission medication history interview status for the 6/16/2022  admission is complete. See EPIC admission navigator for prior to admission medications     Location of Interview: Phone  Medication history sources: Patient's family/friend (mom - [place]) and Sures[employer]s    Significant changes made to the medication list:    - ceftriaxone was last given at 1400 yesterday - mother states that they have #5 2 g vials left to complete the course which would be today plus 4 days left (so, today is day 15 of 20 for the course)     - neb machine was broken - mother states that they just got a new machine so they will be restarting acetylcysteine and albuterol nebs soon     In the past week, patient estimated taking medication this percent of the time: greater than 90%    Medication reconciliation completed by provider prior to medication history? Yes    Time spent in this activity: 30 minutes    Prior to Admission medications    Medication Sig Last Dose Taking? Auth Provider Long Term End Date   acetaminophen (TYLENOL) 650 MG CR tablet Take 650 mg by mouth every 8 hours as needed for mild pain or fever Unknown at PRN Yes Unknown, Entered By History     acetylcysteine (MUCOMYST) 20 % neb solution Take 2 mLs by nebulization 4 times daily With albuterol at 0700, 1100, 1500, and 1900  at RESTARTING - new neb machine arrived Yes Unknown, Entered By History Yes    albuterol (PROVENTIL) (5 MG/ML) 0.5% neb solution Take 2.5 mg by nebulization every 4 hours (while awake) 0700 1100 1500 1900 with mucomyst  at RESTARTING - new neb machine arrived Yes Unknown, Entered By History Yes    aspirin (ASA) 81 MG chewable tablet 81 mg by Oral or Feeding Tube route daily At 0900 6/16/2022 at AM Yes Unknown, Entered By History No    bacitracin ointment Apply topically daily as needed for wound care To PEG site. Unknown at PRN Yes Unknown, Entered By History     Brivaracetam (BRIVIACT) 10 MG/ML solution 100 mg by Oral  or Feeding Tube route 2 times daily 0900, 2100 6/16/2022 at 2000 Yes Unknown, Entered By History No    calcium carbonate 1250 MG/5ML SUSP suspension Take 1,250 mg by mouth 3 times daily 0900, 1500, 2100 6/16/2022 at 0900 Yes Unknown, Entered By History     carBAMazepine (TEGRETOL) 100 MG/5ML suspension Take 100 mg by mouth daily Take at 1800 6/16/2022 at 1800 Yes Unknown, Entered By History Yes    carBAMazepine (TEGRETOL) 100 MG/5ML suspension 150 mg by Oral or Feeding Tube route 3 times daily At 06:00, 12:00, and 24:00 for seizures Past Week Yes Unknown, Entered By History Yes    cefTRIAXone (ROCEPHIN) 1 GM vial Inject 2 g (2,000 mg) into the vein daily for 20 days CBC with differential, creatinine, SGOT weekly while on this medication to be faxed to Dr. Gaitan office. 6/16/2022 at 1400 Yes Senait Orona MD  6/23/22   chlorhexidine (PERIDEX) 0.12 % solution Swish and spit 15 mLs in mouth 2 times daily for 14 days Please see your PCP to evaluate for possible refills. 6/16/2022 at Unknown time Yes Deloris Rivero MD No 6/20/22   guaiFENesin (ROBITUSSIN) 20 mg/mL SOLN solution Take 10 mLs by mouth every 4 hours as needed for cough Unknown at Unknown time Yes Unknown, Entered By History No    hydrocortisone (CORTAID) 1 % external cream Apply topically 2 times daily as needed Apply to reddened memo areas as needed PRN at PRN Yes Unknown, Entered By History     hydrocortisone (CORTEF) 5 MG tablet Take 15 mg (3 tablets) in the morning and 5 mg (1 tablet)  at 2:00 PM. During illness patient takes more as a stress dose. Please increase the dose as directed. 6/16/2022 at 1500 Yes Faizan Mclean MD Yes    levothyroxine (SYNTHROID/LEVOTHROID) 150 MCG tablet Take 1 tablet (150 mcg) by mouth daily 6/16/2022 at AM Yes Faizan Mclean MD Yes    metoclopramide (REGLAN) 10 MG/10ML SOLN solution Take 10 mg by mouth 4 times daily (before meals and nightly) 0800, 1200, 1600, 2000  Disconnects bag before administration,  then waits 45 mins before reconnecting after giving the medication 6/16/2022 at 0800 Yes Unknown, Entered By History     multivitamin, therapeutic (THERA-VIT) TABS tablet Take 1 tablet by mouth daily 6/16/2022 at AM Yes Reported, Patient     pantoprazole (PROTONIX) 2 mg/mL SUSP suspension 20 mLs (40 mg) by Per J Tube route daily 6/16/2022 at 0900 Yes Alvaro Barahona MD     potassium & sodium phosphates (NEUTRA-PHOS) 280-160-250 MG Packet Take 1 packet by mouth 3 times daily Takes at 0800, 1500, and 2100 6/16/2022 at 1500 Yes Yanely Liriano MD No    Scopolamine HBr POWD Dispense #90. Mix contents with small amount of water for admin via J-tube.  Administer 0.8 mg three times each day as needed. 6/16/2022 at 1500 Yes Jennie Bermudez MD No    sodium bicarbonate 650 MG tablet Take 1 tablet (650 mg) by mouth 2 times daily 6/16/2022 at AM Yes Rupali Costa MD     testosterone cypionate (DEPOTESTOSTERONE) 200 MG/ML injection Inject 0.3 mLs (60 mg) into the muscle every 7 days New dose Past Week at TUESDAYS Yes Faizan Mclean MD Yes    vitamin C (ASCORBIC ACID) 1000 MG TABS 1,000 mg by Oral or Feeding Tube route daily  6/16/2022 at AM Yes Unknown, Entered By History     vitamin D3 (CHOLECALCIFEROL) 2000 units (50 mcg) tablet Take 2,000 Units by mouth daily Crush and feed via j-tube @@ 0900 6/16/2022 at AM Yes Unknown, Entered By History No        The information provided in this note is only as accurate as the sources available at the time of update(s)

## 2022-06-17 NOTE — TELEPHONE ENCOUNTER
Last visit 6/13/22 virtual     Routing refill request to provider for review/approval because:  Drugs not on the G refill protocol       mupirocin (BACTROBAN) 2 % external ointment     --   Sig - Route: Apply topically 2 times daily as needed  - Topical   Class: Historical   Order: [id number]     testosterone cypionate (DEPOTESTOSTERONE) 200 MG/ML injection 4 mL 1 1/26/2022  No   Sig - Route: Inject 0.3 mLs (60 mg) into the muscle every 7 days New dose - Intramuscular   Sent to pharmacy as: Testosterone Cypionate 200 MG/ML Intramuscular Solution (DEPOTESTOSTERONE)   Class: E-Prescribe   Order: [id number]   E-Prescribing Status: Receipt confirmed by pharmacy (1/26/2022 12:29 PM CST)   Prior authorization: Approved     Nate Nichols RN  [facility]
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
## 2022-06-18 ENCOUNTER — APPOINTMENT (OUTPATIENT)
Dept: CT IMAGING | Facility: CLINIC | Age: 62
DRG: 177 | End: 2022-06-18
Attending: INTERNAL MEDICINE
Payer: MEDICARE

## 2022-06-18 ENCOUNTER — APPOINTMENT (OUTPATIENT)
Dept: GENERAL RADIOLOGY | Facility: CLINIC | Age: 62
DRG: 177 | End: 2022-06-18
Attending: NURSE PRACTITIONER
Payer: MEDICARE

## 2022-06-18 ENCOUNTER — APPOINTMENT (OUTPATIENT)
Dept: GENERAL RADIOLOGY | Facility: CLINIC | Age: 62
DRG: 177 | End: 2022-06-18
Attending: INTERNAL MEDICINE
Payer: MEDICARE

## 2022-06-18 LAB
ALBUMIN SERPL-MCNC: 2.8 G/DL (ref 3.4–5)
ALP SERPL-CCNC: 137 U/L (ref 40–150)
ALT SERPL W P-5'-P-CCNC: 89 U/L (ref 0–70)
ANION GAP SERPL CALCULATED.3IONS-SCNC: 7 MMOL/L (ref 3–14)
ANION GAP SERPL CALCULATED.3IONS-SCNC: 8 MMOL/L (ref 3–14)
AST SERPL W P-5'-P-CCNC: 123 U/L (ref 0–45)
BASE EXCESS BLDA CALC-SCNC: -2.9 MMOL/L (ref -9–1.8)
BASE EXCESS BLDA CALC-SCNC: -3.8 MMOL/L (ref -9–1.8)
BASE EXCESS BLDV CALC-SCNC: -2 MMOL/L (ref -7.7–1.9)
BILIRUB SERPL-MCNC: 0.3 MG/DL (ref 0.2–1.3)
BUN SERPL-MCNC: 10 MG/DL (ref 7–30)
BUN SERPL-MCNC: 13 MG/DL (ref 7–30)
CALCIUM SERPL-MCNC: 7.5 MG/DL (ref 8.5–10.1)
CALCIUM SERPL-MCNC: 9.1 MG/DL (ref 8.5–10.1)
CHLORIDE BLD-SCNC: 100 MMOL/L (ref 94–109)
CHLORIDE BLD-SCNC: 111 MMOL/L (ref 94–109)
CO2 SERPL-SCNC: 21 MMOL/L (ref 20–32)
CO2 SERPL-SCNC: 23 MMOL/L (ref 20–32)
CREAT SERPL-MCNC: 0.73 MG/DL (ref 0.66–1.25)
CREAT SERPL-MCNC: 0.84 MG/DL (ref 0.66–1.25)
CRP SERPL-MCNC: 136 MG/L (ref 0–8)
D DIMER PPP FEU-MCNC: 1.07 UG/ML FEU (ref 0–0.5)
ERYTHROCYTE [DISTWIDTH] IN BLOOD BY AUTOMATED COUNT: 13.9 % (ref 10–15)
ERYTHROCYTE [DISTWIDTH] IN BLOOD BY AUTOMATED COUNT: 14 % (ref 10–15)
GFR SERPL CREATININE-BSD FRML MDRD: >90 ML/MIN/1.73M2
GFR SERPL CREATININE-BSD FRML MDRD: >90 ML/MIN/1.73M2
GLUCOSE BLD-MCNC: 155 MG/DL (ref 70–99)
GLUCOSE BLD-MCNC: 85 MG/DL (ref 70–99)
GLUCOSE BLDC GLUCOMTR-MCNC: 142 MG/DL (ref 70–99)
GLUCOSE BLDC GLUCOMTR-MCNC: 170 MG/DL (ref 70–99)
GLUCOSE BLDC GLUCOMTR-MCNC: 177 MG/DL (ref 70–99)
GLUCOSE BLDC GLUCOMTR-MCNC: 75 MG/DL (ref 70–99)
HCO3 BLD-SCNC: 21 MMOL/L (ref 21–28)
HCO3 BLD-SCNC: 21 MMOL/L (ref 21–28)
HCO3 BLDV-SCNC: 23 MMOL/L (ref 21–28)
HCT VFR BLD AUTO: 35 % (ref 40–53)
HCT VFR BLD AUTO: 38.2 % (ref 40–53)
HGB BLD-MCNC: 11.3 G/DL (ref 13.3–17.7)
HGB BLD-MCNC: 12.4 G/DL (ref 13.3–17.7)
LACTATE SERPL-SCNC: 2.7 MMOL/L (ref 0.7–2)
LACTATE SERPL-SCNC: 3 MMOL/L (ref 0.7–2)
LACTATE SERPL-SCNC: 6.7 MMOL/L (ref 0.7–2)
MCH RBC QN AUTO: 29.7 PG (ref 26.5–33)
MCH RBC QN AUTO: 29.8 PG (ref 26.5–33)
MCHC RBC AUTO-ENTMCNC: 32.3 G/DL (ref 31.5–36.5)
MCHC RBC AUTO-ENTMCNC: 32.5 G/DL (ref 31.5–36.5)
MCV RBC AUTO: 92 FL (ref 78–100)
MCV RBC AUTO: 92 FL (ref 78–100)
NT-PROBNP SERPL-MCNC: 1263 PG/ML (ref 0–900)
O2/TOTAL GAS SETTING VFR VENT: 2 %
O2/TOTAL GAS SETTING VFR VENT: 40 %
O2/TOTAL GAS SETTING VFR VENT: 60 %
OSMOLALITY SERPL: 298 MMOL/KG (ref 275–295)
OSMOLALITY UR: 148 MMOL/KG (ref 100–1200)
PCO2 BLD: 34 MM HG (ref 35–45)
PCO2 BLD: 35 MM HG (ref 35–45)
PCO2 BLDV: 41 MM HG (ref 40–50)
PH BLD: 7.38 [PH] (ref 7.35–7.45)
PH BLD: 7.4 [PH] (ref 7.35–7.45)
PH BLDV: 7.36 [PH] (ref 7.32–7.43)
PHOSPHATE SERPL-MCNC: 1 MG/DL (ref 2.5–4.5)
PLATELET # BLD AUTO: 125 10E3/UL (ref 150–450)
PLATELET # BLD AUTO: 128 10E3/UL (ref 150–450)
PO2 BLD: 126 MM HG (ref 80–105)
PO2 BLD: 223 MM HG (ref 80–105)
PO2 BLDV: 38 MM HG (ref 25–47)
POTASSIUM BLD-SCNC: 4 MMOL/L (ref 3.4–5.3)
POTASSIUM BLD-SCNC: 4.1 MMOL/L (ref 3.4–5.3)
PROCALCITONIN SERPL-MCNC: 0.28 NG/ML
PROT SERPL-MCNC: 7.2 G/DL (ref 6.8–8.8)
RBC # BLD AUTO: 3.8 10E6/UL (ref 4.4–5.9)
RBC # BLD AUTO: 4.16 10E6/UL (ref 4.4–5.9)
SODIUM SERPL-SCNC: 128 MMOL/L (ref 133–144)
SODIUM SERPL-SCNC: 142 MMOL/L (ref 133–144)
SODIUM UR-SCNC: 33 MMOL/L
TROPONIN I SERPL HS-MCNC: 17 NG/L
WBC # BLD AUTO: 15.8 10E3/UL (ref 4–11)
WBC # BLD AUTO: 5.4 10E3/UL (ref 4–11)

## 2022-06-18 PROCEDURE — 83935 ASSAY OF URINE OSMOLALITY: CPT

## 2022-06-18 PROCEDURE — 999N000190 HC STATISTIC VAT ROUNDS

## 2022-06-18 PROCEDURE — 85027 COMPLETE CBC AUTOMATED: CPT | Performed by: HOSPITALIST

## 2022-06-18 PROCEDURE — 84145 PROCALCITONIN (PCT): CPT | Performed by: NURSE PRACTITIONER

## 2022-06-18 PROCEDURE — 82310 ASSAY OF CALCIUM: CPT | Performed by: HOSPITALIST

## 2022-06-18 PROCEDURE — 94640 AIRWAY INHALATION TREATMENT: CPT | Mod: 76

## 2022-06-18 PROCEDURE — 86140 C-REACTIVE PROTEIN: CPT | Performed by: HOSPITALIST

## 2022-06-18 PROCEDURE — 71275 CT ANGIOGRAPHY CHEST: CPT

## 2022-06-18 PROCEDURE — 999N000065 XR CHEST PORT 1 VIEW

## 2022-06-18 PROCEDURE — 258N000003 HC RX IP 258 OP 636: Performed by: NURSE PRACTITIONER

## 2022-06-18 PROCEDURE — 84484 ASSAY OF TROPONIN QUANT: CPT | Performed by: NURSE PRACTITIONER

## 2022-06-18 PROCEDURE — 83605 ASSAY OF LACTIC ACID: CPT | Performed by: NURSE PRACTITIONER

## 2022-06-18 PROCEDURE — 82803 BLOOD GASES ANY COMBINATION: CPT | Performed by: NURSE PRACTITIONER

## 2022-06-18 PROCEDURE — 258N000003 HC RX IP 258 OP 636: Performed by: HOSPITALIST

## 2022-06-18 PROCEDURE — 250N000013 HC RX MED GY IP 250 OP 250 PS 637: Performed by: INTERNAL MEDICINE

## 2022-06-18 PROCEDURE — 99207 PR NO BILLABLE SERVICE THIS VISIT: CPT

## 2022-06-18 PROCEDURE — 36415 COLL VENOUS BLD VENIPUNCTURE: CPT | Performed by: NURSE PRACTITIONER

## 2022-06-18 PROCEDURE — 250N000011 HC RX IP 250 OP 636: Performed by: INTERNAL MEDICINE

## 2022-06-18 PROCEDURE — 250N000009 HC RX 250: Performed by: INTERNAL MEDICINE

## 2022-06-18 PROCEDURE — 258N000003 HC RX IP 258 OP 636: Performed by: INTERNAL MEDICINE

## 2022-06-18 PROCEDURE — 250N000011 HC RX IP 250 OP 636: Performed by: HOSPITALIST

## 2022-06-18 PROCEDURE — 94668 MNPJ CHEST WALL SBSQ: CPT

## 2022-06-18 PROCEDURE — 999N000157 HC STATISTIC RCP TIME EA 10 MIN

## 2022-06-18 PROCEDURE — 83930 ASSAY OF BLOOD OSMOLALITY: CPT

## 2022-06-18 PROCEDURE — 250N000009 HC RX 250

## 2022-06-18 PROCEDURE — 71045 X-RAY EXAM CHEST 1 VIEW: CPT

## 2022-06-18 PROCEDURE — 82803 BLOOD GASES ANY COMBINATION: CPT

## 2022-06-18 PROCEDURE — 272N000452 HC KIT SHRLOCK 5FR POWER PICC TRIPLE LUMEN

## 2022-06-18 PROCEDURE — 250N000013 HC RX MED GY IP 250 OP 250 PS 637

## 2022-06-18 PROCEDURE — 250N000011 HC RX IP 250 OP 636

## 2022-06-18 PROCEDURE — 82803 BLOOD GASES ANY COMBINATION: CPT | Performed by: INTERNAL MEDICINE

## 2022-06-18 PROCEDURE — 94667 MNPJ CHEST WALL 1ST: CPT

## 2022-06-18 PROCEDURE — 99207 PR NO BILLABLE SERVICE THIS VISIT: CPT | Performed by: NURSE PRACTITIONER

## 2022-06-18 PROCEDURE — 94640 AIRWAY INHALATION TREATMENT: CPT

## 2022-06-18 PROCEDURE — 83605 ASSAY OF LACTIC ACID: CPT | Performed by: INTERNAL MEDICINE

## 2022-06-18 PROCEDURE — 36600 WITHDRAWAL OF ARTERIAL BLOOD: CPT

## 2022-06-18 PROCEDURE — 200N000001 HC R&B ICU

## 2022-06-18 PROCEDURE — 83880 ASSAY OF NATRIURETIC PEPTIDE: CPT | Performed by: NURSE PRACTITIONER

## 2022-06-18 PROCEDURE — 99223 1ST HOSP IP/OBS HIGH 75: CPT | Performed by: INTERNAL MEDICINE

## 2022-06-18 PROCEDURE — 99291 CRITICAL CARE FIRST HOUR: CPT | Performed by: INTERNAL MEDICINE

## 2022-06-18 PROCEDURE — 85027 COMPLETE CBC AUTOMATED: CPT | Performed by: INTERNAL MEDICINE

## 2022-06-18 PROCEDURE — 250N000013 HC RX MED GY IP 250 OP 250 PS 637: Performed by: HOSPITALIST

## 2022-06-18 PROCEDURE — 99233 SBSQ HOSP IP/OBS HIGH 50: CPT | Performed by: HOSPITALIST

## 2022-06-18 PROCEDURE — 36569 INSJ PICC 5 YR+ W/O IMAGING: CPT | Mod: 52

## 2022-06-18 PROCEDURE — 250N000009 HC RX 250: Performed by: HOSPITALIST

## 2022-06-18 PROCEDURE — 250N000011 HC RX IP 250 OP 636: Performed by: NURSE PRACTITIONER

## 2022-06-18 PROCEDURE — 36415 COLL VENOUS BLD VENIPUNCTURE: CPT | Performed by: INTERNAL MEDICINE

## 2022-06-18 PROCEDURE — 85379 FIBRIN DEGRADATION QUANT: CPT | Performed by: HOSPITALIST

## 2022-06-18 PROCEDURE — 84100 ASSAY OF PHOSPHORUS: CPT | Performed by: INTERNAL MEDICINE

## 2022-06-18 PROCEDURE — 94669 MECHANICAL CHEST WALL OSCILL: CPT

## 2022-06-18 PROCEDURE — 84300 ASSAY OF URINE SODIUM: CPT

## 2022-06-18 RX ORDER — HYDROCORTISONE 5 MG/1
5 TABLET ORAL
Status: DISCONTINUED | OUTPATIENT
Start: 2022-06-19 | End: 2022-07-01 | Stop reason: HOSPADM

## 2022-06-18 RX ORDER — NICOTINE POLACRILEX 4 MG
15-30 LOZENGE BUCCAL
Status: DISCONTINUED | OUTPATIENT
Start: 2022-06-18 | End: 2022-07-01 | Stop reason: HOSPADM

## 2022-06-18 RX ORDER — METHYLPREDNISOLONE SODIUM SUCCINATE 125 MG/2ML
125 INJECTION, POWDER, LYOPHILIZED, FOR SOLUTION INTRAMUSCULAR; INTRAVENOUS ONCE
Status: COMPLETED | OUTPATIENT
Start: 2022-06-18 | End: 2022-06-18

## 2022-06-18 RX ORDER — NOREPINEPHRINE BITARTRATE 0.02 MG/ML
.01-.6 INJECTION, SOLUTION INTRAVENOUS CONTINUOUS
Status: DISCONTINUED | OUTPATIENT
Start: 2022-06-18 | End: 2022-06-18

## 2022-06-18 RX ORDER — DEXAMETHASONE SODIUM PHOSPHATE 4 MG/ML
6 INJECTION, SOLUTION INTRA-ARTICULAR; INTRALESIONAL; INTRAMUSCULAR; INTRAVENOUS; SOFT TISSUE EVERY 24 HOURS
Status: COMPLETED | OUTPATIENT
Start: 2022-06-18 | End: 2022-06-27

## 2022-06-18 RX ORDER — GUAIFENESIN 600 MG/1
15 TABLET, EXTENDED RELEASE ORAL DAILY
Status: DISCONTINUED | OUTPATIENT
Start: 2022-06-18 | End: 2022-07-01 | Stop reason: HOSPADM

## 2022-06-18 RX ORDER — PIPERACILLIN SODIUM, TAZOBACTAM SODIUM 4; .5 G/20ML; G/20ML
4.5 INJECTION, POWDER, LYOPHILIZED, FOR SOLUTION INTRAVENOUS EVERY 6 HOURS
Status: DISPENSED | OUTPATIENT
Start: 2022-06-18 | End: 2022-07-01

## 2022-06-18 RX ORDER — METOCLOPRAMIDE HYDROCHLORIDE 5 MG/5ML
10 SOLUTION ORAL
Status: DISCONTINUED | OUTPATIENT
Start: 2022-06-18 | End: 2022-07-01 | Stop reason: HOSPADM

## 2022-06-18 RX ORDER — IOPAMIDOL 755 MG/ML
63 INJECTION, SOLUTION INTRAVASCULAR ONCE
Status: COMPLETED | OUTPATIENT
Start: 2022-06-18 | End: 2022-06-18

## 2022-06-18 RX ORDER — ROPIVACAINE IN 0.9% SOD CHL/PF 0.1 %
.03-.125 PLASTIC BAG, INJECTION (ML) EPIDURAL CONTINUOUS
Status: DISCONTINUED | OUTPATIENT
Start: 2022-06-18 | End: 2022-06-19

## 2022-06-18 RX ORDER — DEXTROSE MONOHYDRATE 25 G/50ML
25-50 INJECTION, SOLUTION INTRAVENOUS
Status: DISCONTINUED | OUTPATIENT
Start: 2022-06-18 | End: 2022-07-01 | Stop reason: HOSPADM

## 2022-06-18 RX ORDER — CALCIUM GLUCONATE 20 MG/ML
1 INJECTION, SOLUTION INTRAVENOUS ONCE
Status: COMPLETED | OUTPATIENT
Start: 2022-06-18 | End: 2022-06-18

## 2022-06-18 RX ADMIN — CARBAMAZEPINE 150 MG: 100 SUSPENSION ORAL at 23:51

## 2022-06-18 RX ADMIN — IOPAMIDOL 63 ML: 755 INJECTION, SOLUTION INTRAVENOUS at 22:14

## 2022-06-18 RX ADMIN — HYDROCORTISONE SODIUM SUCCINATE 100 MG: 100 INJECTION, POWDER, FOR SOLUTION INTRAMUSCULAR; INTRAVENOUS at 11:19

## 2022-06-18 RX ADMIN — SODIUM CHLORIDE, POTASSIUM CHLORIDE, SODIUM LACTATE AND CALCIUM CHLORIDE 500 ML: 600; 310; 30; 20 INJECTION, SOLUTION INTRAVENOUS at 02:58

## 2022-06-18 RX ADMIN — ACETYLCYSTEINE 2 ML: 200 SOLUTION ORAL; RESPIRATORY (INHALATION) at 15:16

## 2022-06-18 RX ADMIN — CARBAMAZEPINE 100 MG: 100 SUSPENSION ORAL at 18:22

## 2022-06-18 RX ADMIN — CEFTRIAXONE SODIUM 2 G: 2 INJECTION, POWDER, FOR SOLUTION INTRAMUSCULAR; INTRAVENOUS at 11:14

## 2022-06-18 RX ADMIN — ASPIRIN 81 MG CHEWABLE TABLET 81 MG: 81 TABLET CHEWABLE at 11:19

## 2022-06-18 RX ADMIN — BRIVARACETAM 100 MG: 10 SOLUTION ORAL at 21:55

## 2022-06-18 RX ADMIN — BARICITINIB 4 MG: 2 TABLET, FILM COATED ORAL at 15:24

## 2022-06-18 RX ADMIN — VANCOMYCIN HYDROCHLORIDE 1500 MG: 5 INJECTION, POWDER, LYOPHILIZED, FOR SOLUTION INTRAVENOUS at 16:57

## 2022-06-18 RX ADMIN — CARBAMAZEPINE 150 MG: 100 SUSPENSION ORAL at 11:19

## 2022-06-18 RX ADMIN — ACETYLCYSTEINE 2 ML: 200 SOLUTION ORAL; RESPIRATORY (INHALATION) at 12:11

## 2022-06-18 RX ADMIN — ACETAMINOPHEN 650 MG: 325 SUSPENSION ORAL at 02:25

## 2022-06-18 RX ADMIN — PIPERACILLIN SODIUM AND TAZOBACTAM SODIUM 4.5 G: 4; .5 INJECTION, POWDER, LYOPHILIZED, FOR SOLUTION INTRAVENOUS at 23:51

## 2022-06-18 RX ADMIN — POTASSIUM & SODIUM PHOSPHATES POWDER PACK 280-160-250 MG 1 PACKET: 280-160-250 PACK at 21:01

## 2022-06-18 RX ADMIN — ACETAMINOPHEN 650 MG: 325 SUSPENSION ORAL at 06:39

## 2022-06-18 RX ADMIN — METHYLPREDNISOLONE SODIUM SUCCINATE 125 MG: 125 INJECTION, POWDER, FOR SOLUTION INTRAMUSCULAR; INTRAVENOUS at 03:34

## 2022-06-18 RX ADMIN — METOCLOPRAMIDE HYDROCHLORIDE 10 MG: 5 SOLUTION ORAL at 22:40

## 2022-06-18 RX ADMIN — SODIUM PHOSPHATE, MONOBASIC, MONOHYDRATE 30 MMOL: 276; 142 INJECTION, SOLUTION INTRAVENOUS at 23:52

## 2022-06-18 RX ADMIN — METOCLOPRAMIDE HYDROCHLORIDE 10 MG: 5 SOLUTION ORAL at 16:57

## 2022-06-18 RX ADMIN — ALBUTEROL SULFATE 2.5 MG: 2.5 SOLUTION RESPIRATORY (INHALATION) at 20:51

## 2022-06-18 RX ADMIN — DEXAMETHASONE SODIUM PHOSPHATE 6 MG: 4 INJECTION, SOLUTION INTRAMUSCULAR; INTRAVENOUS at 16:57

## 2022-06-18 RX ADMIN — ENOXAPARIN SODIUM 40 MG: 40 INJECTION SUBCUTANEOUS at 11:19

## 2022-06-18 RX ADMIN — ACETAMINOPHEN 650 MG: 325 SUSPENSION ORAL at 11:19

## 2022-06-18 RX ADMIN — PIPERACILLIN SODIUM AND TAZOBACTAM SODIUM 4.5 G: 4; .5 INJECTION, POWDER, LYOPHILIZED, FOR SOLUTION INTRAVENOUS at 16:56

## 2022-06-18 RX ADMIN — SODIUM BICARBONATE 650 MG TABLET 650 MG: at 21:01

## 2022-06-18 RX ADMIN — CARBAMAZEPINE 150 MG: 100 SUSPENSION ORAL at 06:40

## 2022-06-18 RX ADMIN — SODIUM CHLORIDE 89 ML: 9 INJECTION, SOLUTION INTRAVENOUS at 22:15

## 2022-06-18 RX ADMIN — Medication 40 MG: at 11:19

## 2022-06-18 RX ADMIN — CALCIUM GLUCONATE 1 G: 20 INJECTION, SOLUTION INTRAVENOUS at 16:57

## 2022-06-18 RX ADMIN — ACETYLCYSTEINE 2 ML: 200 SOLUTION ORAL; RESPIRATORY (INHALATION) at 20:47

## 2022-06-18 RX ADMIN — POTASSIUM & SODIUM PHOSPHATES POWDER PACK 280-160-250 MG 1 PACKET: 280-160-250 PACK at 15:24

## 2022-06-18 RX ADMIN — CALCIUM CARBONATE 1250 MG: 1250 SUSPENSION ORAL at 23:51

## 2022-06-18 RX ADMIN — CALCIUM CARBONATE 1250 MG: 1250 SUSPENSION ORAL at 11:20

## 2022-06-18 RX ADMIN — ACETYLCYSTEINE 2 ML: 200 SOLUTION ORAL; RESPIRATORY (INHALATION) at 07:27

## 2022-06-18 RX ADMIN — CALCIUM CARBONATE 1250 MG: 1250 SUSPENSION ORAL at 18:23

## 2022-06-18 RX ADMIN — ALBUTEROL SULFATE 2.5 MG: 2.5 SOLUTION RESPIRATORY (INHALATION) at 15:16

## 2022-06-18 RX ADMIN — BRIVARACETAM 100 MG: 10 SOLUTION ORAL at 12:31

## 2022-06-18 RX ADMIN — Medication 15 ML: at 15:25

## 2022-06-18 RX ADMIN — ALBUTEROL SULFATE 2.5 MG: 2.5 SOLUTION RESPIRATORY (INHALATION) at 07:27

## 2022-06-18 RX ADMIN — LEVOTHYROXINE SODIUM 150 MCG: 150 TABLET ORAL at 06:40

## 2022-06-18 RX ADMIN — ALBUTEROL SULFATE 2.5 MG: 2.5 SOLUTION RESPIRATORY (INHALATION) at 12:11

## 2022-06-18 ASSESSMENT — ACTIVITIES OF DAILY LIVING (ADL)
ADLS_ACUITY_SCORE: 65
ADLS_ACUITY_SCORE: 59
ADLS_ACUITY_SCORE: 65
ADLS_ACUITY_SCORE: 65
ADLS_ACUITY_SCORE: 63
ADLS_ACUITY_SCORE: 61
ADLS_ACUITY_SCORE: 63
ADLS_ACUITY_SCORE: 65
ADLS_ACUITY_SCORE: 61
ADLS_ACUITY_SCORE: 65
ADLS_ACUITY_SCORE: 59
ADLS_ACUITY_SCORE: 61

## 2022-06-18 NOTE — CONSULTS
CLINICAL NUTRITION SERVICES  -  ASSESSMENT NOTE      Recommendations Ordered by Registered Dietitian (RD):   Continue to replace and re-check Phos  Add liquid MVI/M daily   Change product to Osmolite 1.5 as a comparable fiber-free formula in light of future pressor requirements   Resumed TF Osmolite 1.5 at 30 mL/hr x 4 hrs and advance to goal rate of 60 mL/hr as tolerated (continue to hold 1 hr before and 1 hr after synthroid dose in AM)  Osmolite 1.5 at 60 mL/hr x 22 hrs = 1980 kcal (27 kcal/kg), 83 g protein (1.1 g/kg & 93% estimated needs), 269 g CHO, 0 g fiber and 1005 mL free water   Malnutrition:   % Weight Loss:  None noted  % Intake:  Decreased intake does not meet criteria for malnutrition   Subcutaneous Fat Loss:  (5/12/22 - RD assessment) Orbital region moderate depletion- baseline  Muscle Loss:  (5/12/22 - RD Assessment) Temporal region moderate depletion and Clavicle bone region moderate depletion - baseline  Fluid Retention:  None noted    Malnutrition Diagnosis: Patient does not meet two of the above criteria necessary for diagnosing malnutrition at this time         REASON FOR ASSESSMENT  Keyon Farias is a 61 year old male seen by Registered Dietitian for Provider Order - Registered Dietitian to Assess and Order TF per Medical Nutrition Therapy Protocol      NUTRITION HISTORY  Chart reviewed   Admitted under observation status 6/16 and flipped inpatient 6/17 - see RD note 6/17 for original TF consult note  Well known to nutrition services   Baseline tube feedings as outlined below ~  Type of Feeding Tube: Jejunostomy  Enteral Frequency:  22 hr cycle  Enteral Regimen: Jevity 1.5 at 60 mL/hr x 22 hours per day (holding TF 1 hour before and 1 hour after Synthroid dose in AM)  Total Enteral Provisions: 1980 kcal, 84 g protein, 28 g fiber, 1003 mL H2O  Free Water Flush: typically receives 3378-7708 mL daily (160-200 mL flushes q 4 hours)      CURRENT NUTRITION ORDERS  Diet Order:     NPO  Type of Feeding  "Tube: Jejunostomy  Enteral Frequency:  22 hr cycle  Enteral Regimen: Jevity 1.5 at 60 mL/hr x 22 hours per day (holding TF 1 hour before and 1 hour after Synthroid dose in AM)  Total Enteral Provisions: 1980 kcal (27 kcal/kg), 84 g protein (1.2 g/kg), 28 g fiber, 1003 mL H2O  Free Water Flush: 60 mL q 4 hrs     Current Intake/Tolerance:  TF ordered 6/17 while patient in ED but there is no documentation that it was ever started   D/t low Phos (currently replacing) modify orders to resume TF at 30 mL/hr and advance to goal rate after 4 hrs     On 30 L HFCN in ICU. Mother wishes to pursue intubation if needed    WOCN following - reviewed coccyx and groin   \"Pt has history of intermittent skin breakdown and rash to coccyx and groin area.  Coccyx currently very lightly intact with thin red blanchable tissue; no obvious active pressure injury.  Groin appears starting to develop rash.  Pt frequently incontinent of urine and stool. \"      NUTRITION FOCUSED PHYSICAL ASSESSMENT FOR DIAGNOSING MALNUTRITION)  No:  Unable to assess per direction of department guidelines in the setting of COVID19               Observed:    Deferred at this time  Per previous admission --> baseline moderate orbital depletion + baseline moderate temporal and clavicle bone regions     Obtained from Chart/Interdisciplinary Team:  None noted     ANTHROPOMETRICS  Height: 6' 0\"  Weight: 163 lbs 2.25 oz  Body mass index is 22.13 kg/m .  Weight Status:  Normal BMI  IBW: 81 kg  % IBW: 91%  Weight History:   Wt Readings from Last 10 Encounters:   06/18/22 74 kg (163 lb 2.3 oz)   06/07/22 71 kg (156 lb 8 oz)   05/11/22 68.1 kg (150 lb 3.2 oz)   03/18/22 72.6 kg (160 lb)   12/11/21 75.2 kg (165 lb 12.6 oz)   11/17/21 73.6 kg (162 lb 3.2 oz)   10/30/21 75.2 kg (165 lb 12.6 oz)   08/10/21 78.8 kg (173 lb 11.6 oz)   05/24/21 80.1 kg (176 lb 8 oz)   04/16/21 76 kg (167 lb 8.8 oz)       LABS  Labs reviewed  Na 128 (L)   K 4  Mg 1.7  Phos 1.8 " (L)    MEDICATIONS  Medications reviewed  Levothyroxine q morning   Reglan   Neutra-Phos TID   Levophed started - not yet been given   D10 running per TF protocol     ASSESSED NUTRITION NEEDS PER APPROVED PRACTICE GUIDELINES:    Dosing Weight 74 kg -admit   Estimated Energy Needs: 2113-3405 kcals (25-30 Kcal/Kg)  Justification: maintenance  Estimated Protein Needs:  grams protein (1.2-1.5 g pro/Kg)  Justification: preservation of lean body mass  Estimated Fluid Needs: per MD      MALNUTRITION:  % Weight Loss:  None noted  % Intake:  Decreased intake does not meet criteria for malnutrition   Subcutaneous Fat Loss:  (5/12/22 - RD assessment) Orbital region moderate depletion- baseline  Muscle Loss:  (5/12/22 - RD Assessment) Temporal region moderate depletion and Clavicle bone region moderate depletion - baseline  Fluid Retention:  None noted    Malnutrition Diagnosis: Patient does not meet two of the above criteria necessary for diagnosing malnutrition at this time     NUTRITION DIAGNOSIS:  Inadequate protein-energy intake related to TF paused for transfer as evidenced by receiving 0% nutrient needs x1-2 days      NUTRITION INTERVENTIONS  Recommendations / Nutrition Prescription  Continue to replace and re-check Phos  Add liquid MVI/M daily   Change product to Osmolite 1.5 as a comparable fiber-free formula in light of future pressor requirements   Resumed TF Osmolite 1.5 at 30 mL/hr x 4 hrs and advance to goal rate of 60 mL/hr as tolerated (continue to hold 1 hr before and 1 hr after synthroid dose in AM)      Implementation  Nutrition education: Not appropriate at this time due to patient condition  EN Schedule, Multivitamin/Mineral: as above   Collaboration and Referral of Nutrition care: spoke with RN      Nutrition Goals  EN to meet % estimated needs       MONITORING AND EVALUATION:  Progress towards goals will be monitored and evaluated per protocol and Practice Guidelines        Yanely Jefferson  KECIA SOMERS  Clinical Dietitian   Weekend pager [phone]
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
## 2022-06-18 NOTE — CODE/RAPID RESPONSE
[facility]    RRT Note  6/18/2022   Time Called: 6:48 AM    Code Status: Full Code    RRT called for: decreased level of consciousness and hypoxia    I was called to evaluate Keyon Farias, who is a 61 year old male who was admitted on 6/16/2022 for fever. PMH includes TBI, spastic hemiplegia, epilepsy, dysphagia s/p GJ FT, frequent aspiration pneumonia and aspiration pneumonitis events as well as recent strep bacteremia resulting in septic shock requiring intubation and pressors prolonged hospitalization discharged 6/8/2022.    Assessment & Plan   Acute encephalopathy suspect 2/2 severe sepsis  Acute Hypoxic Respiratory Failure suspect 2/2 COVID-19 pneumonia     Bedside RN reports that patient desaturated to 65% while on room air, and was more difficult to wake prompting RRT.  Upon arrival patient is lying in bed at 45 degrees with his eyes closed, opens eyes to vigorous shaking.  Patient has a shallow irregular breathing pattern with a respiratory rate of 24 and SPO2 of 99% on 15 L oxymask.  VS include HR 79, BP 99/52, and RR 24. Patient's lungs are coarse with rhonchi throughout both lung fields. No murmur, rub, or gallop appreciated. Tube feeds have been held. Unable to obtain ROS due to patient's baseline non-verbal state. RN reports baseline mental status patient is alert, can gesture with hands, follows some simple commands, and says 1-2 words.    This is the second RRT on Mr. Farias this morning. Patient was evaluated by my colleague Graham Goodman NP this morning for AMS and hypotension.  A thorough diagnostic work-up was completed at that time which was remarkable for a lactic acid of 3, procalcitonin 0.28, D-dimer 1.07, proBNP 1263, and CXR showing Bibasilar atelectasis or infiltrate and prominent central vasculature.  Hypotension resolved after receiving a total of 500 mL LR bolus. Patient was also started on stress-dosed steroids.    Differentials considered while evaluating  this patient include PE, aspiration pneumonitis, new superimposed bacterial pneumonia, pulmonary edema, ARDS    INTERVENTIONS:  -   - HFNC  - Nasal Trumpet and NT suction  - ABG 7.40/34/223/21  - Lactic acid 2.7, down from 3  - Initiate chest physiotherapy QID  - Transfer to ICU with Hospitalist as Primary    Goals of Care   I spoke with patient's mother and legal guardian Savannah Farias regarding patient's condition.  I expressed concern that although he appears to be stable now, there is a possibility he may decompensate again, and may require intubation if he cannot protect his airway or he is not oxygenating well with a high flow nasal cannula.  She expressed that she would want to move forward with intubation if it comes to that.  I did express that there are risks with intubation such as prolonged intubation and difficulty weaning off the ventilator, resulting in possible tracheostomy.  She understands and would still like to pursue intubation if that is what the patient needs.    At the conclusion of this evaluation the patient is oxygenating well with an SPO2 of 99 to 100% on high flow nasal cannula at 30 L/min and 40% FiO2. His respiratory pattern is not as shallow and more regular, respiratory rate is between 22-24, BP is 119/51.  Patient's condition was discussed with and defer further cares to Hospitalist Dr. Schmitt.      NATHANIEL Granados [facility]   Hospitalist Service - House RADHA  Pager: [phone] (7a - 6p)      Physical Exam   Vital Signs with Ranges:  Temp:  [98.5  F (36.9  C)-101.7  F (38.7  C)] 99.9  F (37.7  C)  Pulse:  [] 83  Resp:  [12-41] 28  BP: ()/(20-71) 84/45  FiO2 (%):  [40 %] 40 %  SpO2:  [65 %-100 %] 96 %  I/O last 3 completed shifts:  In: 2050 [I.V.:1450; IV Piggyback:600]  Out: 1150 [Urine:1150]            Physical Exam   General: Ill-appearing, pale.  Somnolent, arousable to vigorous shaking.  Skin:  Warm, dry. No rashes or lesions on exposed skin.  HEENT:   Normocephalic, atraumatic  Neck:  Supple. Trachea midline.  Chest:  Breath sounds course throughout both lung fields, shallow irregular breathing pattern.   Cardiovascular:  RRR, no rub or murmur. No peripheral edema.  Abdomen:  Soft, non-tender, non-distended.  Musculoskeletal:  Moves all four extremities. No muscle atrophy.  Neurological:  Follows some simple commands, no focal neuro deficits appreciated.  Psychiatric:  Calm    Data       IMAGING: (X-ray/CT/MRI)   Recent Results (from the past 24 hour(s))   XR Chest Port 1 View    Narrative    EXAM: XR CHEST PORT 1 VIEW  LOCATION: [facility]  DATE/TIME: 6/18/2022 3:25 AM    INDICATION: COVID +, wet sounding lungs  COMPARISON: 06/16/2022      Impression    IMPRESSION: Shallow inspiration. Stable cardiomediastinal silhouette. Bibasilar atelectasis or infiltrate. No significant effusion. Prominent central vasculature.       CBC with Diff:  Recent Labs   Lab Test 06/18/22  0327 06/16/22  2248   WBC 5.4  --    HGB 11.3*  --    MCV 92  --    *  --    INR  --  1.22*      Absolute Retic (10e9/L)   Date Value   10/27/2020 43.6     % Retic (%)   Date Value   10/27/2020 1.6       Comprehensive Metabolic Panel:  Recent Labs   Lab 06/18/22  0656 06/18/22  0327 06/18/22  0240 06/17/22  2316 06/17/22  1231 06/16/22  2141   NA  --  128*  --   --   --  129*   POTASSIUM  --  4.0  --   --   --  4.2   CHLORIDE  --  100  --   --   --  97   CO2  --  21  --   --   --  27   ANIONGAP  --  7  --   --   --  5   * 85   < >  --    < > 96   BUN  --  13  --   --   --  17   CR  --  0.84  --   --   --  0.82   GFRESTIMATED  --  >90  --   --   --  >90   STEVE  --  7.5*  --   --   --  8.2*   MAG  --   --   --  1.7  --   --    PHOS  --   --   --  1.8*  --   --    PROTTOTAL  --   --   --   --   --  7.6   ALBUMIN  --   --   --   --   --  3.2*   BILITOTAL  --   --   --   --   --  0.2   ALKPHOS  --   --   --   --   --  126   AST  --   --   --   --   --  34   ALT  --    --   --   --   --  44    < > = values in this interval not displayed.         Time Spent on this Encounter   CRITICAL CARE TIME  I spent 45 minutes of critical care time on the unit/floor managing the care of Keyon Farias. Upon evaluation, this patient had a high probability of imminent or life-threatening deterioration due to Acute Hypoxic Respiratory Failure which required my direct attention, intervention, and personal management. 100% of my time was spent at the bedside counseling the patient and/or coordinating care regarding services listed in this note.

## 2022-06-18 NOTE — PROGRESS NOTES
Dominic's test performed prior to radial ABG draw. Collateral circulation confirmed.  Site:Left radial  Device:HFNC 35 LPM FiO2 100%  6/18/2022  Heather Baugh, RT
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
## 2022-06-18 NOTE — PROGRESS NOTES
Nursing note  Order to place a PICC for possible pressors. Pt has pre existing ML to the left upper arm that was leaking when the writer attempted to flush it, no blood return noted either. The writer attempt x 2 to place a new PICC, got into the vein both times with good blood return but unable to thread wire both times. The writer attempted to do catheter exchange, able to thread wire, but unable to advance catheter to the designated position. Left it as a ML. Refer to CXR. Pt nurse notified.

## 2022-06-18 NOTE — CONSULTS
ID consult dictated IMp 1 60 yo male , well known to us very frequent sepsis/asp admits, recent strep bacteremia, now COVID, hypoxia and sepsisa    REC agree current tx and await cxs

## 2022-06-18 NOTE — PHARMACY-VANCOMYCIN DOSING SERVICE
"Pharmacy Vancomycin Initial Note  Date of Service 2022  Patient's  1960  61 year old, male    Indication: [facility] Pneumonia    Current estimated CrCl = Estimated Creatinine Clearance: 99.1 mL/min (based on SCr of 0.84 mg/dL).    Creatinine for last 3 days  2022:  9:41 PM Creatinine 0.82 mg/dL  2022:  3:27 AM Creatinine 0.84 mg/dL    Recent Vancomycin Level(s) for last 3 days  No results found for requested labs within last 72 hours.      Vancomycin IV Administrations (past 72 hours)                   vancomycin 1750 mg in 0.9% NaCl 500 ml intermittent infusion 1,750 mg (mg) 1,750 mg Given 22 0040                Nephrotoxins and other renal medications (From now, onward)    Start     Dose/Rate Route Frequency Ordered Stop    22 0800  vancomycin 1500 mg in 0.9% NaCl 250 ml intermittent infusion 1,500 mg         1,500 mg  over 90 Minutes Intravenous EVERY 18 HOURS 22 1334      22 1330  piperacillin-tazobactam (ZOSYN) 4.5 g vial to attach to  mL bag        Note to Pharmacy: For [facility], [facility] and [facility]: For Zosyn-naive patients, use the \"Zosyn initial dose + extended infusion\" order panel.    4.5 g  over 30 Minutes Intravenous EVERY 6 HOURS 22 1306      22 1330  norepinephrine (LEVOPHED) 4 mg in  mL infusion PREMIX         0.01-0.6 mcg/kg/min × 74 kg  2.8-166.5 mL/hr  Intravenous CONTINUOUS 22 1318      22 1330  vancomycin 1500 mg in 0.9% NaCl 250 ml intermittent infusion 1,500 mg         1,500 mg  over 90 Minutes Intravenous ONCE 22 1328            Contrast Orders - past 72 hours (72h ago, onward)    None          [employer] Prediction of Planned Initial Vancomycin Regimen  Loading dose: N/A  Regimen: 1500 mg IV every 18 hours.  Start time: 14:31 on 2022  Exposure target: AUC24 (range)400-600 mg/L.hr   AUC24,ss: 507 mg/L.hr  Probability of AUC24 > 400: 75 %  Ctrough,ss: 14.0 mg/L  Probability of Ctrough,ss > 20: 23 " %  Probability of nephrotoxicity (Lodise ERNESTO 2009): 9 %          Plan:  1. Start vancomycin  1500 mg IV q18h.   2. Vancomycin monitoring method: AUC  3. Vancomycin therapeutic monitoring goal: 400-600 mg*h/L  4. Pharmacy will check vancomycin levels as appropriate in 1-3 Days.    5. Serum creatinine levels will be ordered a minimum of twice weekly.      Autumn Pickering, RPH

## 2022-06-18 NOTE — PROGRESS NOTES
RRT called, pt NT suctioned for small amount of blood tinged cloudy think secretion, ABG drawn and sent, and pt placed on HFNC 30L 40% for humidity sats 98%.    Breanna Pratt, RT

## 2022-06-18 NOTE — PROCEDURES
[facility]    Triple Lumen PICC Placement    Date/Time: 6/18/2022 5:50 PM  Performed by: Kacie Montero RN  Authorized by: Pola Pope MD   Indications: vascular access      UNIVERSAL PROTOCOL   Site Marked: Yes  Prior Images Obtained and Reviewed:  Yes  Required items: Required blood products, implants, devices and special equipment available    Patient identity confirmed:  Verbally with patient, arm band and hospital-assigned identification number  NA - No sedation, light sedation, or local anesthesia  Confirmation Checklist:  Patient's identity using two indicators, relevant allergies, procedure was appropriate and matched the consent or emergent situation and correct equipment/implants were available  Time out: Immediately prior to the procedure a time out was called    Universal Protocol: the Joint Commission Universal Protocol was followed    Preparation: Patient was prepped and draped in usual sterile fashion       ANESTHESIA    Local Anesthetic: Lidocaine 1% without epinephrine  Anesthetic Total (mL):  3      SEDATION    Patient Sedated: No        Skin prep agent: skin prep agent completely dried prior to procedure  Sterile barriers: maximum sterile barriers were used: cap, mask, sterile gown, sterile gloves, and large sterile sheet  Hand hygiene: hand hygiene performed prior to central venous catheter insertion  Type of line used: Power PICC  Catheter type: triple lumen  Lumen type: valved  Catheter size: 5 Fr  Brand: Bard  Lot number: [id number]  Placement method: venipuncture, MST and ultrasound  Number of attempts: 2  Difficulty threading catheter: yes  Successful placement: no  Comment: unable to advance, will maintain as midline and provider notified  Orientation: left    Location: brachial vein (medial)  Arm circumference: adults 10 cm  Extremity circumference: 30  Visible catheter length: 14  Internal length: 34 cm  Total catheter length: 48  Dressing and securement:  subcutaneous anchor securement system, transparent securement dressing, transparent dressing, chlorhexidine patch applied and sterile dressing applied  Post procedure assessment: blood return through all ports, placement verified by x-ray and placement verfied by 3CG technology  PROCEDURE   Patient Tolerance:  Patient tolerated the procedure well with no immediate complicationsDescribe Procedure: See above note from the writer.
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
## 2022-06-18 NOTE — PROGRESS NOTES
Patient remains on HFNC 35 LPM FiO2 40% with SpO2 in the mid to upper 90's. Skin intact under oxygen device. BS coarse. All nebs and vest treatments were done per MD order.  Will cont to follow.  6/18/2022  Heather Baugh, RT

## 2022-06-19 ENCOUNTER — APPOINTMENT (OUTPATIENT)
Dept: CARDIOLOGY | Facility: CLINIC | Age: 62
DRG: 177 | End: 2022-06-19
Payer: MEDICARE

## 2022-06-19 LAB
ALBUMIN SERPL-MCNC: 2.7 G/DL (ref 3.4–5)
ALBUMIN SERPL-MCNC: 2.8 G/DL (ref 3.4–5)
ALP SERPL-CCNC: 127 U/L (ref 40–150)
ALP SERPL-CCNC: 133 U/L (ref 40–150)
ALT SERPL W P-5'-P-CCNC: 74 U/L (ref 0–70)
ALT SERPL W P-5'-P-CCNC: 75 U/L (ref 0–70)
ANION GAP SERPL CALCULATED.3IONS-SCNC: 5 MMOL/L (ref 3–14)
ANION GAP SERPL CALCULATED.3IONS-SCNC: 8 MMOL/L (ref 3–14)
AST SERPL W P-5'-P-CCNC: 102 U/L (ref 0–45)
AST SERPL W P-5'-P-CCNC: 108 U/L (ref 0–45)
BILIRUB SERPL-MCNC: 0.2 MG/DL (ref 0.2–1.3)
BILIRUB SERPL-MCNC: 0.2 MG/DL (ref 0.2–1.3)
BUN SERPL-MCNC: 10 MG/DL (ref 7–30)
BUN SERPL-MCNC: 10 MG/DL (ref 7–30)
CALCIUM SERPL-MCNC: 8.5 MG/DL (ref 8.5–10.1)
CALCIUM SERPL-MCNC: 9 MG/DL (ref 8.5–10.1)
CHLORIDE BLD-SCNC: 114 MMOL/L (ref 94–109)
CHLORIDE BLD-SCNC: 114 MMOL/L (ref 94–109)
CO2 SERPL-SCNC: 23 MMOL/L (ref 20–32)
CO2 SERPL-SCNC: 26 MMOL/L (ref 20–32)
CREAT SERPL-MCNC: 0.74 MG/DL (ref 0.66–1.25)
CREAT SERPL-MCNC: 0.76 MG/DL (ref 0.66–1.25)
ERYTHROCYTE [DISTWIDTH] IN BLOOD BY AUTOMATED COUNT: 14 % (ref 10–15)
GFR SERPL CREATININE-BSD FRML MDRD: >90 ML/MIN/1.73M2
GFR SERPL CREATININE-BSD FRML MDRD: >90 ML/MIN/1.73M2
GLUCOSE BLD-MCNC: 173 MG/DL (ref 70–99)
GLUCOSE BLD-MCNC: 198 MG/DL (ref 70–99)
GLUCOSE BLDC GLUCOMTR-MCNC: 147 MG/DL (ref 70–99)
GLUCOSE BLDC GLUCOMTR-MCNC: 157 MG/DL (ref 70–99)
GLUCOSE BLDC GLUCOMTR-MCNC: 162 MG/DL (ref 70–99)
GLUCOSE BLDC GLUCOMTR-MCNC: 197 MG/DL (ref 70–99)
GLUCOSE BLDC GLUCOMTR-MCNC: 197 MG/DL (ref 70–99)
GLUCOSE BLDC GLUCOMTR-MCNC: 256 MG/DL (ref 70–99)
HCT VFR BLD AUTO: 37 % (ref 40–53)
HGB BLD-MCNC: 12.3 G/DL (ref 13.3–17.7)
LACTATE SERPL-SCNC: 3 MMOL/L (ref 0.7–2)
LACTATE SERPL-SCNC: 4.5 MMOL/L (ref 0.7–2)
LVEF ECHO: NORMAL
MCH RBC QN AUTO: 29.5 PG (ref 26.5–33)
MCHC RBC AUTO-ENTMCNC: 33.2 G/DL (ref 31.5–36.5)
MCV RBC AUTO: 89 FL (ref 78–100)
PHOSPHATE SERPL-MCNC: 2.2 MG/DL (ref 2.5–4.5)
PLATELET # BLD AUTO: 156 10E3/UL (ref 150–450)
POTASSIUM BLD-SCNC: 3.5 MMOL/L (ref 3.4–5.3)
POTASSIUM BLD-SCNC: 3.8 MMOL/L (ref 3.4–5.3)
PROT SERPL-MCNC: 6.8 G/DL (ref 6.8–8.8)
PROT SERPL-MCNC: 7.2 G/DL (ref 6.8–8.8)
RBC # BLD AUTO: 4.17 10E6/UL (ref 4.4–5.9)
SODIUM SERPL-SCNC: 145 MMOL/L (ref 133–144)
SODIUM SERPL-SCNC: 145 MMOL/L (ref 133–144)
WBC # BLD AUTO: 17.6 10E3/UL (ref 4–11)

## 2022-06-19 PROCEDURE — 93325 DOPPLER ECHO COLOR FLOW MAPG: CPT | Mod: 26 | Performed by: INTERNAL MEDICINE

## 2022-06-19 PROCEDURE — 250N000011 HC RX IP 250 OP 636: Performed by: INTERNAL MEDICINE

## 2022-06-19 PROCEDURE — 99232 SBSQ HOSP IP/OBS MODERATE 35: CPT | Performed by: HOSPITALIST

## 2022-06-19 PROCEDURE — 258N000003 HC RX IP 258 OP 636: Performed by: INTERNAL MEDICINE

## 2022-06-19 PROCEDURE — 93321 DOPPLER ECHO F-UP/LMTD STD: CPT | Mod: 26 | Performed by: INTERNAL MEDICINE

## 2022-06-19 PROCEDURE — 258N000003 HC RX IP 258 OP 636: Performed by: HOSPITALIST

## 2022-06-19 PROCEDURE — 999N000190 HC STATISTIC VAT ROUNDS

## 2022-06-19 PROCEDURE — 94640 AIRWAY INHALATION TREATMENT: CPT

## 2022-06-19 PROCEDURE — 250N000009 HC RX 250: Performed by: INTERNAL MEDICINE

## 2022-06-19 PROCEDURE — 250N000013 HC RX MED GY IP 250 OP 250 PS 637: Performed by: INTERNAL MEDICINE

## 2022-06-19 PROCEDURE — 250N000009 HC RX 250

## 2022-06-19 PROCEDURE — 999N000208 ECHOCARDIOGRAM LIMITED

## 2022-06-19 PROCEDURE — 36415 COLL VENOUS BLD VENIPUNCTURE: CPT | Performed by: INTERNAL MEDICINE

## 2022-06-19 PROCEDURE — 84155 ASSAY OF PROTEIN SERUM: CPT | Performed by: INTERNAL MEDICINE

## 2022-06-19 PROCEDURE — 250N000013 HC RX MED GY IP 250 OP 250 PS 637

## 2022-06-19 PROCEDURE — 999N000157 HC STATISTIC RCP TIME EA 10 MIN

## 2022-06-19 PROCEDURE — 250N000011 HC RX IP 250 OP 636: Performed by: HOSPITALIST

## 2022-06-19 PROCEDURE — 250N000011 HC RX IP 250 OP 636

## 2022-06-19 PROCEDURE — 200N000001 HC R&B ICU

## 2022-06-19 PROCEDURE — 93308 TTE F-UP OR LMTD: CPT | Mod: 26 | Performed by: INTERNAL MEDICINE

## 2022-06-19 PROCEDURE — 83605 ASSAY OF LACTIC ACID: CPT | Performed by: INTERNAL MEDICINE

## 2022-06-19 PROCEDURE — 255N000002 HC RX 255 OP 636: Performed by: INTERNAL MEDICINE

## 2022-06-19 PROCEDURE — 80053 COMPREHEN METABOLIC PANEL: CPT | Performed by: INTERNAL MEDICINE

## 2022-06-19 PROCEDURE — 99233 SBSQ HOSP IP/OBS HIGH 50: CPT | Performed by: INTERNAL MEDICINE

## 2022-06-19 PROCEDURE — 84100 ASSAY OF PHOSPHORUS: CPT | Performed by: INTERNAL MEDICINE

## 2022-06-19 PROCEDURE — 85014 HEMATOCRIT: CPT | Performed by: INTERNAL MEDICINE

## 2022-06-19 PROCEDURE — 84450 TRANSFERASE (AST) (SGOT): CPT | Performed by: INTERNAL MEDICINE

## 2022-06-19 PROCEDURE — 250N000012 HC RX MED GY IP 250 OP 636 PS 637: Performed by: INTERNAL MEDICINE

## 2022-06-19 RX ORDER — ALBUTEROL SULFATE 90 UG/1
2 AEROSOL, METERED RESPIRATORY (INHALATION)
Status: DISCONTINUED | OUTPATIENT
Start: 2022-06-19 | End: 2022-06-20

## 2022-06-19 RX ORDER — ALBUTEROL SULFATE 0.83 MG/ML
2.5 SOLUTION RESPIRATORY (INHALATION)
Status: DISCONTINUED | OUTPATIENT
Start: 2022-06-19 | End: 2022-06-19

## 2022-06-19 RX ADMIN — BRIVARACETAM 100 MG: 10 SOLUTION ORAL at 20:19

## 2022-06-19 RX ADMIN — CALCIUM CARBONATE 1250 MG: 1250 SUSPENSION ORAL at 15:57

## 2022-06-19 RX ADMIN — METOCLOPRAMIDE HYDROCHLORIDE 10 MG: 5 SOLUTION ORAL at 19:47

## 2022-06-19 RX ADMIN — CALCIUM CARBONATE 1250 MG: 1250 SUSPENSION ORAL at 07:51

## 2022-06-19 RX ADMIN — Medication 40 MG: at 07:46

## 2022-06-19 RX ADMIN — CARBAMAZEPINE 150 MG: 100 SUSPENSION ORAL at 06:06

## 2022-06-19 RX ADMIN — SODIUM PHOSPHATE, MONOBASIC, MONOHYDRATE 9 MMOL: 276; 142 INJECTION, SOLUTION INTRAVENOUS at 07:41

## 2022-06-19 RX ADMIN — DEXAMETHASONE SODIUM PHOSPHATE 6 MG: 4 INJECTION, SOLUTION INTRAMUSCULAR; INTRAVENOUS at 13:14

## 2022-06-19 RX ADMIN — HUMAN ALBUMIN MICROSPHERES AND PERFLUTREN 9 ML: 10; .22 INJECTION, SOLUTION INTRAVENOUS at 13:07

## 2022-06-19 RX ADMIN — POTASSIUM & SODIUM PHOSPHATES POWDER PACK 280-160-250 MG 1 PACKET: 280-160-250 PACK at 15:57

## 2022-06-19 RX ADMIN — CARBAMAZEPINE 100 MG: 100 SUSPENSION ORAL at 18:37

## 2022-06-19 RX ADMIN — POTASSIUM & SODIUM PHOSPHATES POWDER PACK 280-160-250 MG 1 PACKET: 280-160-250 PACK at 07:45

## 2022-06-19 RX ADMIN — ALBUTEROL SULFATE 2.5 MG: 2.5 SOLUTION RESPIRATORY (INHALATION) at 15:58

## 2022-06-19 RX ADMIN — HYDROCORTISONE 15 MG: 10 TABLET ORAL at 07:48

## 2022-06-19 RX ADMIN — POTASSIUM & SODIUM PHOSPHATES POWDER PACK 280-160-250 MG 1 PACKET: 280-160-250 PACK at 21:35

## 2022-06-19 RX ADMIN — INSULIN ASPART 3 UNITS: 100 INJECTION, SOLUTION INTRAVENOUS; SUBCUTANEOUS at 19:47

## 2022-06-19 RX ADMIN — ACETAMINOPHEN 650 MG: 325 SUSPENSION ORAL at 07:47

## 2022-06-19 RX ADMIN — VANCOMYCIN HYDROCHLORIDE 1500 MG: 5 INJECTION, POWDER, LYOPHILIZED, FOR SOLUTION INTRAVENOUS at 07:41

## 2022-06-19 RX ADMIN — SODIUM BICARBONATE 650 MG TABLET 650 MG: at 07:47

## 2022-06-19 RX ADMIN — PIPERACILLIN SODIUM AND TAZOBACTAM SODIUM 4.5 G: 4; .5 INJECTION, POWDER, LYOPHILIZED, FOR SOLUTION INTRAVENOUS at 13:14

## 2022-06-19 RX ADMIN — METOCLOPRAMIDE HYDROCHLORIDE 10 MG: 5 SOLUTION ORAL at 18:36

## 2022-06-19 RX ADMIN — BRIVARACETAM 100 MG: 10 SOLUTION ORAL at 07:45

## 2022-06-19 RX ADMIN — ALBUTEROL SULFATE 2.5 MG: 2.5 SOLUTION RESPIRATORY (INHALATION) at 07:17

## 2022-06-19 RX ADMIN — INSULIN ASPART 1 UNITS: 100 INJECTION, SOLUTION INTRAVENOUS; SUBCUTANEOUS at 08:00

## 2022-06-19 RX ADMIN — LEVOTHYROXINE SODIUM 150 MCG: 150 TABLET ORAL at 07:46

## 2022-06-19 RX ADMIN — CARBAMAZEPINE 150 MG: 100 SUSPENSION ORAL at 13:17

## 2022-06-19 RX ADMIN — ALBUTEROL SULFATE 2.5 MG: 2.5 SOLUTION RESPIRATORY (INHALATION) at 01:00

## 2022-06-19 RX ADMIN — CALCIUM CARBONATE 1250 MG: 1250 SUSPENSION ORAL at 19:47

## 2022-06-19 RX ADMIN — ASPIRIN 81 MG CHEWABLE TABLET 81 MG: 81 TABLET CHEWABLE at 07:47

## 2022-06-19 RX ADMIN — PIPERACILLIN SODIUM AND TAZOBACTAM SODIUM 4.5 G: 4; .5 INJECTION, POWDER, LYOPHILIZED, FOR SOLUTION INTRAVENOUS at 18:36

## 2022-06-19 RX ADMIN — Medication 15 ML: at 15:58

## 2022-06-19 RX ADMIN — INSULIN ASPART 2 UNITS: 100 INJECTION, SOLUTION INTRAVENOUS; SUBCUTANEOUS at 16:21

## 2022-06-19 RX ADMIN — ACETYLCYSTEINE 2 ML: 200 SOLUTION ORAL; RESPIRATORY (INHALATION) at 16:07

## 2022-06-19 RX ADMIN — BARICITINIB 4 MG: 2 TABLET, FILM COATED ORAL at 15:58

## 2022-06-19 RX ADMIN — INSULIN ASPART 1 UNITS: 100 INJECTION, SOLUTION INTRAVENOUS; SUBCUTANEOUS at 04:07

## 2022-06-19 RX ADMIN — SODIUM BICARBONATE 650 MG TABLET 650 MG: at 21:35

## 2022-06-19 RX ADMIN — INSULIN ASPART 2 UNITS: 100 INJECTION, SOLUTION INTRAVENOUS; SUBCUTANEOUS at 23:57

## 2022-06-19 RX ADMIN — ENOXAPARIN SODIUM 40 MG: 40 INJECTION SUBCUTANEOUS at 11:03

## 2022-06-19 RX ADMIN — ACETYLCYSTEINE 2 ML: 200 SOLUTION ORAL; RESPIRATORY (INHALATION) at 07:17

## 2022-06-19 RX ADMIN — HYDROCORTISONE 5 MG: 5 TABLET ORAL at 15:57

## 2022-06-19 RX ADMIN — INSULIN ASPART 2 UNITS: 100 INJECTION, SOLUTION INTRAVENOUS; SUBCUTANEOUS at 00:04

## 2022-06-19 RX ADMIN — PIPERACILLIN SODIUM AND TAZOBACTAM SODIUM 4.5 G: 4; .5 INJECTION, POWDER, LYOPHILIZED, FOR SOLUTION INTRAVENOUS at 23:41

## 2022-06-19 RX ADMIN — PIPERACILLIN SODIUM AND TAZOBACTAM SODIUM 4.5 G: 4; .5 INJECTION, POWDER, LYOPHILIZED, FOR SOLUTION INTRAVENOUS at 06:03

## 2022-06-19 RX ADMIN — INSULIN ASPART 1 UNITS: 100 INJECTION, SOLUTION INTRAVENOUS; SUBCUTANEOUS at 13:48

## 2022-06-19 RX ADMIN — CARBAMAZEPINE 150 MG: 100 SUSPENSION ORAL at 23:41

## 2022-06-19 ASSESSMENT — ACTIVITIES OF DAILY LIVING (ADL)
ADLS_ACUITY_SCORE: 55
ADLS_ACUITY_SCORE: 61
ADLS_ACUITY_SCORE: 63
ADLS_ACUITY_SCORE: 55
ADLS_ACUITY_SCORE: 55
ADLS_ACUITY_SCORE: 61

## 2022-06-19 NOTE — PROGRESS NOTES
Neuro: Alert. NVBL. Follows some commands.   CV: RRR. Tele SR. BP WNL.   Respiratory: LS coarse in Bases, more so in R. Refusing to wear HFNC. Satting high 90's w/o.  GI/: Ext cath. UOP great.   Skin: Denuded, mascerated in groin memo area.  Activity: BR  Diet: NPO, TF  Drips: None  Other: Lactate trending down.   Plan: Possible transfer to floor.
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
## 2022-06-19 NOTE — PROGRESS NOTES
Patient transferred to ICU at 1030.     Neuro: Obtunded on arrival, but now alert and open eyes spontaneously. Follow some commands. Nonverbal at baseline  Pulm: Coarse LS. On HF.  CV: SR. Soft BP on arrival, but improved. BP WNL  : External catheter placed. Good UOP  GI: Loose BMs. TF started slowly and now at his goal.  Extremities: Purposeful movements on BUE. Localized movements on BLE.   Skin: excoriated memo-area. Rash on lower abdomen. Scab on R knee. Scattered bruises throughout the body  Lines: triple lumen midline on LUE  Family: Mother updated via phone.   Plan: Continue to monitor and support.

## 2022-06-19 NOTE — PROGRESS NOTES
[facility]  Infectious Disease Progress Note          Assessment and Plan:   IMPRESSION:     1.  A 61-year-old male, very well known to us, including recent admission and discharge with strep bacteremia, now admitted with respiratory insufficiency, COVID-19 positive, also possible pneumonia or aspiration as part of diagnosis.  2.  Recent Strep salivarius bacteremia, transesophageal echocardiogram negative, but in the midst of an extended IV course.  3.  History of numerous admissions, frequently as monthly with aspiration, sepsis, often Pseudomonas colonized sputum as cause.  4.  Traumatic brain injury with major chronic neurologic abnormalities.  5.  COVID-19, now being treated.  6.  MRSA (methicillin-resistant Staphylococcus aureus) and VRE (vancomycin-resistant Enterococcus) colonization.     RECOMMENDATIONS:     1.  Continue current broad antibiotics.  Await current microbiology and adjust.  2.  Treat COVID-19 as we are doing.  3.  Ongoing discussions with family regarding level of care.  Although now again (as historically commonly) much better , O2 better T down        Interval History:   no new complaints looks Ok O2 better T down cxs pending Bc neg              Medications:       acetylcysteine  2 mL Nebulization 4x Daily     albuterol  2.5 mg Nebulization 4x daily     aspirin  81 mg Oral or Feeding Tube Daily     baricitinib  4 mg Oral Daily     Brivaracetam  100 mg Oral or Feeding Tube BID     calcium carbonate  1,250 mg Oral or Feeding Tube TID     carBAMazepine  100 mg Oral or Feeding Tube Daily     carBAMazepine  150 mg Oral or Feeding Tube TID     dexamethasone  6 mg Intravenous Q24H     enoxaparin ANTICOAGULANT  40 mg Subcutaneous Q24H     hydrocortisone  15 mg Oral or Feeding Tube Daily     hydrocortisone  5 mg Oral or Feeding Tube Daily at 4 pm     insulin aspart  1-6 Units Subcutaneous Q4H     levothyroxine  150 mcg Per Feeding Tube QAM AC     metoclopramide  10 mg Per  Feeding Tube 4x Daily AC & HS     multivitamins w/minerals  15 mL Per J Tube Daily     pantoprazole  40 mg Per J Tube Daily     piperacillin-tazobactam  4.5 g Intravenous Q6H     potassium & sodium phosphates  1 packet Per Feeding Tube TID     sodium bicarbonate  650 mg Oral or Feeding Tube BID     vancomycin  1,500 mg Intravenous Q18H                  Physical Exam:   Blood pressure 129/76, pulse 73, temperature 97.1  F (36.2  C), temperature source Axillary, resp. rate 11, height 1.829 m (6'), weight 73.5 kg (162 lb 0.6 oz), SpO2 98 %.  Wt Readings from Last 2 Encounters:   06/19/22 73.5 kg (162 lb 0.6 oz)   06/07/22 71 kg (156 lb 8 oz)     Vital Signs with Ranges  Temp:  [97.1  F (36.2  C)-98.1  F (36.7  C)] 97.1  F (36.2  C)  Pulse:  [60-96] 73  Resp:  [11-25] 11  BP: ()/(39-82) 129/76  FiO2 (%):  [30 %-65 %] 40 %  SpO2:  [87 %-100 %] 98 %    Constitutional: Awake, alert, cooperative, no apparent distress looks like usual self   Lungs: Clear to auscultation bilaterally, no crackles or wheezing   Cardiovascular: Regular rate and rhythm, normal S1 and S2, and no murmur noted   Abdomen: Normal bowel sounds, soft, non-distended, non-tender   Skin: No rashes, no cyanosis, no edema   Other:           Data:   All microbiology laboratory data reviewed.  Recent Labs   Lab Test 06/19/22  0546 06/18/22 2111 06/18/22  0327   WBC 17.6* 15.8* 5.4   HGB 12.3* 12.4* 11.3*   HCT 37.0* 38.2* 35.0*   MCV 89 92 92    128* 125*     Recent Labs   Lab Test 06/19/22  0546 06/19/22  0143 06/18/22  2111   CR 0.74 0.76 0.73     No lab results found.  Recent Labs   Lab Test 05/23/21  0316 05/23/21  0250 04/15/21  2250 02/22/21  1622 02/22/21  1439 02/22/21  1413 02/19/21  1155 02/19/21  1123 01/15/21  0214   CULT No growth No growth No growth No growth No growth No growth No growth No growth >100,000 colonies/mL  Klebsiella pneumoniae  *

## 2022-06-19 NOTE — PROGRESS NOTES
Neuro: Alert. Participating in communication at his baseline.   Pulm: Coarse LS. On RA  CV: SR. BP WNL  : External catheter placed. Good UOP  GI: Loose BMs. TF at his goal.   Extremities: Purposeful movements on BUE. Localized movements on BLE.   Skin: Excoriated memo-area from incontinence. Rash on lower abdomen. Scab on R knee. Scattered bruises throughout the body  Lines: Triple lumen midline on LUE  Family: Mother updated via phone.   Plan: Continue to monitor and support. Awaiting for transfer out of ICU.

## 2022-06-19 NOTE — CONSULTS
Consult Date: 06/18/2022    INFECTIOUS DISEASE CONSULTATION    LOCATION:  ICU, room 345, [facility].    REFERRING PHYSICIAN:  Dr. Pope    IMPRESSION:     1.  A 61-year-old male, very well known to us, including recent admission and discharge with strep bacteremia, now admitted with respiratory insufficiency, COVID-19 positive, also possible pneumonia or aspiration as part of diagnosis.  2.  Recent Strep salivarius bacteremia, transesophageal echocardiogram negative, but in the midst of an extended IV course.  3.  History of numerous admissions, frequently as monthly with aspiration, sepsis, often Pseudomonas colonized sputum as cause.  4.  Traumatic brain injury with major chronic neurologic abnormalities.  5.  COVID-19, now being treated.  6.  MRSA (methicillin-resistant Staphylococcus aureus) and VRE (vancomycin-resistant Enterococcus) colonization.    RECOMMENDATIONS:     1.  Agree with current broad antibiotics.  Await current microbiology and adjust.  2.  Treat COVID-19 as we are doing.  3.  Ongoing discussions with family regarding level of care.    HISTORY OF PRESENT ILLNESS:  This 61-year-old male is seen in consultation due to new sepsis and COVID-19.  The patient was recently hospitalized with extended hospitalization with septic shock, intubated, found to have Strep salivarius bacteremia, unclear etiology.  Transesophageal echocardiogram negative.  Possible oropharyngeal or pulmonary origin.  He has been in the midst of a course of antibiotics and progressively worsened.  He had COVID-19 exposure and is now COVID-19 positive.  He is in the ICU on high-flow oxygen currently.    PAST MEDICAL HISTORY:  Numerous prior admissions, frequently involving us with recurrent aspiration, sepsis and similar history of traumatic brain injury previously.    SOCIAL AND FAMILY HISTORY:  MRSA and VRE colonization.  Many discussions historically about level of care, remains full care.    REVIEW OF SYSTEMS:   Unobtainable.    PHYSICAL EXAMINATION:    GENERAL:  The patient is lying in bed, major respiratory insufficiency, looks like his usual self, and neurologic status about the same.  HEENT:  No visible lesions.  NECK:  Supple, nontender.   HEART:  Tachycardic.  LUNGS:  Crackles, both bases.  Okay air movement.  ABDOMEN:  Nontender.    EXTREMITIES:  Neurologic changes related to his traumatic brain injury, muscle wasting, etc.    LABORATORY AND IMAGING DATA:  Recent blood cultures with Strep salivarius.  Current notable microbiology for positive COVID-19 antigen test.  White count 17,000.  Creatinine within normal limits.  Chest x-ray with some infiltrates.    Thank you very much for the consultation.  I will follow the patient with you.    Yoni Gaitan MD        D: 2022   T: 2022   MT: AYSE    Name:     BERT BARAJAS  MRN:      [id number]        Account:      [id number]   :      1960           Consult Date: 2022     Document: [id number]    cc:  Pola Pope MD

## 2022-06-20 LAB
ALBUMIN SERPL-MCNC: 2.6 G/DL (ref 3.4–5)
ALP SERPL-CCNC: 108 U/L (ref 40–150)
ALT SERPL W P-5'-P-CCNC: 60 U/L (ref 0–70)
ANION GAP SERPL CALCULATED.3IONS-SCNC: 5 MMOL/L (ref 3–14)
AST SERPL W P-5'-P-CCNC: 78 U/L (ref 0–45)
BILIRUB SERPL-MCNC: 0.3 MG/DL (ref 0.2–1.3)
BUN SERPL-MCNC: 12 MG/DL (ref 7–30)
CALCIUM SERPL-MCNC: 8.2 MG/DL (ref 8.5–10.1)
CHLORIDE BLD-SCNC: 111 MMOL/L (ref 94–109)
CO2 SERPL-SCNC: 25 MMOL/L (ref 20–32)
CREAT SERPL-MCNC: 0.73 MG/DL (ref 0.66–1.25)
CRP SERPL-MCNC: 87.5 MG/L (ref 0–8)
ERYTHROCYTE [DISTWIDTH] IN BLOOD BY AUTOMATED COUNT: 15 % (ref 10–15)
FERRITIN SERPL-MCNC: 344 NG/ML (ref 26–388)
GFR SERPL CREATININE-BSD FRML MDRD: >90 ML/MIN/1.73M2
GLUCOSE BLD-MCNC: 209 MG/DL (ref 70–99)
GLUCOSE BLDC GLUCOMTR-MCNC: 206 MG/DL (ref 70–99)
GLUCOSE BLDC GLUCOMTR-MCNC: 219 MG/DL (ref 70–99)
GLUCOSE BLDC GLUCOMTR-MCNC: 220 MG/DL (ref 70–99)
GLUCOSE BLDC GLUCOMTR-MCNC: 226 MG/DL (ref 70–99)
GLUCOSE BLDC GLUCOMTR-MCNC: 243 MG/DL (ref 70–99)
GLUCOSE BLDC GLUCOMTR-MCNC: 287 MG/DL (ref 70–99)
GLUCOSE BLDC GLUCOMTR-MCNC: 325 MG/DL (ref 70–99)
GLUCOSE BLDC GLUCOMTR-MCNC: 353 MG/DL (ref 70–99)
GLUCOSE BLDC GLUCOMTR-MCNC: 371 MG/DL (ref 70–99)
HCT VFR BLD AUTO: 33.4 % (ref 40–53)
HGB BLD-MCNC: 10.4 G/DL (ref 13.3–17.7)
MAGNESIUM SERPL-MCNC: 2.2 MG/DL (ref 1.6–2.3)
MCH RBC QN AUTO: 30.6 PG (ref 26.5–33)
MCHC RBC AUTO-ENTMCNC: 31.1 G/DL (ref 31.5–36.5)
MCV RBC AUTO: 98 FL (ref 78–100)
PHOSPHATE SERPL-MCNC: 1.8 MG/DL (ref 2.5–4.5)
PLATELET # BLD AUTO: 500 10E3/UL (ref 150–450)
POTASSIUM BLD-SCNC: 4.1 MMOL/L (ref 3.4–5.3)
PROT SERPL-MCNC: 6.7 G/DL (ref 6.8–8.8)
RBC # BLD AUTO: 3.4 10E6/UL (ref 4.4–5.9)
SODIUM SERPL-SCNC: 141 MMOL/L (ref 133–144)
VANCOMYCIN SERPL-MCNC: 15.8 MG/L
WBC # BLD AUTO: 8.8 10E3/UL (ref 4–11)

## 2022-06-20 PROCEDURE — 250N000013 HC RX MED GY IP 250 OP 250 PS 637: Performed by: HOSPITALIST

## 2022-06-20 PROCEDURE — 250N000013 HC RX MED GY IP 250 OP 250 PS 637

## 2022-06-20 PROCEDURE — 120N000001 HC R&B MED SURG/OB

## 2022-06-20 PROCEDURE — 36415 COLL VENOUS BLD VENIPUNCTURE: CPT | Performed by: INTERNAL MEDICINE

## 2022-06-20 PROCEDURE — 80202 ASSAY OF VANCOMYCIN: CPT | Performed by: INTERNAL MEDICINE

## 2022-06-20 PROCEDURE — 85027 COMPLETE CBC AUTOMATED: CPT | Performed by: INTERNAL MEDICINE

## 2022-06-20 PROCEDURE — 250N000011 HC RX IP 250 OP 636: Performed by: INTERNAL MEDICINE

## 2022-06-20 PROCEDURE — 94669 MECHANICAL CHEST WALL OSCILL: CPT

## 2022-06-20 PROCEDURE — 250N000013 HC RX MED GY IP 250 OP 250 PS 637: Performed by: INTERNAL MEDICINE

## 2022-06-20 PROCEDURE — 94640 AIRWAY INHALATION TREATMENT: CPT

## 2022-06-20 PROCEDURE — 999N000157 HC STATISTIC RCP TIME EA 10 MIN

## 2022-06-20 PROCEDURE — 82728 ASSAY OF FERRITIN: CPT | Performed by: HOSPITALIST

## 2022-06-20 PROCEDURE — 250N000011 HC RX IP 250 OP 636

## 2022-06-20 PROCEDURE — 999N000190 HC STATISTIC VAT ROUNDS

## 2022-06-20 PROCEDURE — 94640 AIRWAY INHALATION TREATMENT: CPT | Mod: 76

## 2022-06-20 PROCEDURE — 250N000009 HC RX 250: Performed by: HOSPITALIST

## 2022-06-20 PROCEDURE — 999N000197 HC STATISTIC WOC PT EDUCATION, 0-15 MIN

## 2022-06-20 PROCEDURE — 80053 COMPREHEN METABOLIC PANEL: CPT | Performed by: INTERNAL MEDICINE

## 2022-06-20 PROCEDURE — 99232 SBSQ HOSP IP/OBS MODERATE 35: CPT | Performed by: HOSPITALIST

## 2022-06-20 PROCEDURE — 94668 MNPJ CHEST WALL SBSQ: CPT

## 2022-06-20 PROCEDURE — 250N000011 HC RX IP 250 OP 636: Performed by: HOSPITALIST

## 2022-06-20 PROCEDURE — 83735 ASSAY OF MAGNESIUM: CPT

## 2022-06-20 PROCEDURE — 86140 C-REACTIVE PROTEIN: CPT | Performed by: HOSPITALIST

## 2022-06-20 PROCEDURE — 84100 ASSAY OF PHOSPHORUS: CPT

## 2022-06-20 PROCEDURE — 250N000009 HC RX 250

## 2022-06-20 PROCEDURE — 258N000003 HC RX IP 258 OP 636: Performed by: HOSPITALIST

## 2022-06-20 RX ORDER — SODIUM CHLORIDE 9 MG/ML
INJECTION, SOLUTION INTRAVENOUS CONTINUOUS
Status: DISCONTINUED | OUTPATIENT
Start: 2022-06-20 | End: 2022-07-01 | Stop reason: HOSPADM

## 2022-06-20 RX ORDER — IPRATROPIUM BROMIDE AND ALBUTEROL SULFATE 2.5; .5 MG/3ML; MG/3ML
3 SOLUTION RESPIRATORY (INHALATION)
Status: DISCONTINUED | OUTPATIENT
Start: 2022-06-20 | End: 2022-06-29

## 2022-06-20 RX ADMIN — SODIUM BICARBONATE 650 MG TABLET 650 MG: at 21:22

## 2022-06-20 RX ADMIN — ASPIRIN 81 MG CHEWABLE TABLET 81 MG: 81 TABLET CHEWABLE at 08:03

## 2022-06-20 RX ADMIN — BRIVARACETAM 100 MG: 10 SOLUTION ORAL at 20:06

## 2022-06-20 RX ADMIN — Medication 15 ML: at 17:43

## 2022-06-20 RX ADMIN — INSULIN ASPART 5 UNITS: 100 INJECTION, SOLUTION INTRAVENOUS; SUBCUTANEOUS at 20:06

## 2022-06-20 RX ADMIN — BRIVARACETAM 100 MG: 10 SOLUTION ORAL at 08:03

## 2022-06-20 RX ADMIN — METOCLOPRAMIDE HYDROCHLORIDE 10 MG: 5 SOLUTION ORAL at 08:02

## 2022-06-20 RX ADMIN — POTASSIUM & SODIUM PHOSPHATES POWDER PACK 280-160-250 MG 1 PACKET: 280-160-250 PACK at 17:42

## 2022-06-20 RX ADMIN — INSULIN ASPART 3 UNITS: 100 INJECTION, SOLUTION INTRAVENOUS; SUBCUTANEOUS at 12:25

## 2022-06-20 RX ADMIN — INSULIN ASPART 2 UNITS: 100 INJECTION, SOLUTION INTRAVENOUS; SUBCUTANEOUS at 04:21

## 2022-06-20 RX ADMIN — PIPERACILLIN SODIUM AND TAZOBACTAM SODIUM 4.5 G: 4; .5 INJECTION, POWDER, LYOPHILIZED, FOR SOLUTION INTRAVENOUS at 19:09

## 2022-06-20 RX ADMIN — VANCOMYCIN HYDROCHLORIDE 1500 MG: 5 INJECTION, POWDER, LYOPHILIZED, FOR SOLUTION INTRAVENOUS at 21:23

## 2022-06-20 RX ADMIN — Medication 40 MG: at 08:00

## 2022-06-20 RX ADMIN — LEVOTHYROXINE SODIUM 150 MCG: 150 TABLET ORAL at 08:03

## 2022-06-20 RX ADMIN — CARBAMAZEPINE 100 MG: 100 SUSPENSION ORAL at 19:09

## 2022-06-20 RX ADMIN — CALCIUM CARBONATE 1250 MG: 1250 SUSPENSION ORAL at 08:37

## 2022-06-20 RX ADMIN — SODIUM BICARBONATE 650 MG TABLET 650 MG: at 08:02

## 2022-06-20 RX ADMIN — HYDROCORTISONE 5 MG: 5 TABLET ORAL at 17:43

## 2022-06-20 RX ADMIN — VANCOMYCIN HYDROCHLORIDE 1500 MG: 5 INJECTION, POWDER, LYOPHILIZED, FOR SOLUTION INTRAVENOUS at 02:17

## 2022-06-20 RX ADMIN — METOCLOPRAMIDE HYDROCHLORIDE 10 MG: 5 SOLUTION ORAL at 12:26

## 2022-06-20 RX ADMIN — PIPERACILLIN SODIUM AND TAZOBACTAM SODIUM 4.5 G: 4; .5 INJECTION, POWDER, LYOPHILIZED, FOR SOLUTION INTRAVENOUS at 12:30

## 2022-06-20 RX ADMIN — BARICITINIB 4 MG: 2 TABLET, FILM COATED ORAL at 17:42

## 2022-06-20 RX ADMIN — METOCLOPRAMIDE HYDROCHLORIDE 10 MG: 5 SOLUTION ORAL at 20:06

## 2022-06-20 RX ADMIN — INSULIN ASPART 2 UNITS: 100 INJECTION, SOLUTION INTRAVENOUS; SUBCUTANEOUS at 07:54

## 2022-06-20 RX ADMIN — ACETYLCYSTEINE 2 ML: 200 SOLUTION ORAL; RESPIRATORY (INHALATION) at 20:23

## 2022-06-20 RX ADMIN — INSULIN ASPART 3 UNITS: 100 INJECTION, SOLUTION INTRAVENOUS; SUBCUTANEOUS at 17:17

## 2022-06-20 RX ADMIN — CARBAMAZEPINE 150 MG: 100 SUSPENSION ORAL at 12:26

## 2022-06-20 RX ADMIN — METOCLOPRAMIDE HYDROCHLORIDE 10 MG: 5 SOLUTION ORAL at 17:42

## 2022-06-20 RX ADMIN — CALCIUM CARBONATE 1250 MG: 1250 SUSPENSION ORAL at 17:45

## 2022-06-20 RX ADMIN — DEXAMETHASONE SODIUM PHOSPHATE 6 MG: 4 INJECTION, SOLUTION INTRAMUSCULAR; INTRAVENOUS at 12:28

## 2022-06-20 RX ADMIN — IPRATROPIUM BROMIDE AND ALBUTEROL SULFATE 3 ML: .5; 3 SOLUTION RESPIRATORY (INHALATION) at 10:13

## 2022-06-20 RX ADMIN — ENOXAPARIN SODIUM 40 MG: 40 INJECTION SUBCUTANEOUS at 10:22

## 2022-06-20 RX ADMIN — POTASSIUM & SODIUM PHOSPHATES POWDER PACK 280-160-250 MG 1 PACKET: 280-160-250 PACK at 21:22

## 2022-06-20 RX ADMIN — POTASSIUM & SODIUM PHOSPHATES POWDER PACK 280-160-250 MG 1 PACKET: 280-160-250 PACK at 08:02

## 2022-06-20 RX ADMIN — ACETYLCYSTEINE 2 ML: 200 SOLUTION ORAL; RESPIRATORY (INHALATION) at 10:13

## 2022-06-20 RX ADMIN — CARBAMAZEPINE 150 MG: 100 SUSPENSION ORAL at 05:56

## 2022-06-20 RX ADMIN — ACETYLCYSTEINE 2 ML: 200 SOLUTION ORAL; RESPIRATORY (INHALATION) at 16:09

## 2022-06-20 RX ADMIN — HYDROCORTISONE 15 MG: 10 TABLET ORAL at 08:02

## 2022-06-20 RX ADMIN — IPRATROPIUM BROMIDE AND ALBUTEROL SULFATE 3 ML: .5; 3 SOLUTION RESPIRATORY (INHALATION) at 20:26

## 2022-06-20 RX ADMIN — IPRATROPIUM BROMIDE AND ALBUTEROL SULFATE 3 ML: .5; 3 SOLUTION RESPIRATORY (INHALATION) at 16:09

## 2022-06-20 RX ADMIN — PIPERACILLIN SODIUM AND TAZOBACTAM SODIUM 4.5 G: 4; .5 INJECTION, POWDER, LYOPHILIZED, FOR SOLUTION INTRAVENOUS at 05:54

## 2022-06-20 ASSESSMENT — ACTIVITIES OF DAILY LIVING (ADL)
ADLS_ACUITY_SCORE: 57
ADLS_ACUITY_SCORE: 57
ADLS_ACUITY_SCORE: 61
ADLS_ACUITY_SCORE: 57
ADLS_ACUITY_SCORE: 61
ADLS_ACUITY_SCORE: 61
ADLS_ACUITY_SCORE: 57
ADLS_ACUITY_SCORE: 61

## 2022-06-20 NOTE — PROGRESS NOTES
Neuro: NVBL at baseline. Alert.  CV: SR/SB.  Respiratory: LS coarse in bases. Productive cough.  GI/: BMx1. UOP good per external cath.  Skin: Scattered bruising, MAsceration to groin. Abd rash.   Activity: BR  Diet: NPO, TF. TF at 60ml/hr.  Drips: None.  Other:   Plan: Awaiting transfer to .

## 2022-06-20 NOTE — CONSULTS
[facility] Nurse Inpatient Assessment        New consult received, charting reviewed, consult is duplicate from 6-17-22 consult, please review documentation and plan of care in 6-17 [facility] note. Continue plan of care. Next [facility] nurse follow up weekly ( ~6-).     06/17/22 0329  Wound Ostomy Continence Nurse IP Consult: Type of Consult: Pressure Injury; Location of Injury: coccyx; Call back #: .  ONE TIME     Complete     Provider: (Not yet assigned)   Question Answer Comment   Type of Consult Pressure Injury    Location of Injury coccyx    Call back # .            06/19/22 1345  Wound Ostomy Continence Nurse IP Consult: Type of Consult: Pressure Injury, Wound; Location of Wound(s): excoriation from incontinence; Location of Injury: inner groin, coccyx, scrotum; Call back #: [phone]  ONE TIME     Complete     Comments: Provider order required EXCEPT for hospital acquired pressure injury.   Provider: (Not yet assigned)   Question Answer Comment   Course of Action: Eval & Treat per Protocol    Type of Consult Pressure Injury    Type of Consult Wound    Location of Wound(s) excoriation from incontinence    Location of Injury inner groin, coccyx, scrotum              Shellie GILBERT   Dept. Pager: [phone]  Dept. Office Number: [phone]

## 2022-06-20 NOTE — PROGRESS NOTES
[facility]    Patient is currently on service with [facility] for IV ceftriaxone 2g q24 through 6/23/22. Nursing provided by [facility].      Liaison will follow along. If applicable at discharge, please include Home Infusion Referral in discharge orders.     Thank you,    Heather Crandall RN  [facility] Liaison  [phone] (M-F 8a-5p) [phone] Office

## 2022-06-20 NOTE — PROGRESS NOTES
[facility]  Infectious Disease Progress Note          Assessment and Plan:   IMPRESSION:     1.  A 61-year-old male, very well known to us, including recent admission and discharge with strep bacteremia, now admitted with respiratory insufficiency, COVID-19 positive, also possible pneumonia or aspiration as part of diagnosis.  2.  Recent Strep salivarius bacteremia, transesophageal echocardiogram negative, but in the midst of an extended IV course.  3.  History of numerous admissions, frequently as monthly with aspiration, sepsis, often Pseudomonas colonized sputum as cause.  4.  Traumatic brain injury with major chronic neurologic abnormalities.  5.  COVID-19, now being treated.  6.  MRSA (methicillin-resistant Staphylococcus aureus) and VRE (vancomycin-resistant Enterococcus) colonization.     RECOMMENDATIONS:     1.  Continue current broad antibiotics.  Await current microbiology and adjust.  2.  Treat COVID-19 as we are doing.  3.  Ongoing discussions with family regarding level of care.  Although now again (as historically commonly) much better , O2 better T down        Interval History:   no new complaints looks Ok O2 better T down cxs pending Bc neg              Medications:       acetylcysteine  2 mL Nebulization 4x Daily     aspirin  81 mg Oral or Feeding Tube Daily     baricitinib  4 mg Oral Daily     Brivaracetam  100 mg Oral or Feeding Tube BID     calcium carbonate  1,250 mg Oral or Feeding Tube TID     carBAMazepine  100 mg Oral or Feeding Tube Daily     carBAMazepine  150 mg Oral or Feeding Tube TID     dexamethasone  6 mg Intravenous Q24H     enoxaparin ANTICOAGULANT  40 mg Subcutaneous Q24H     hydrocortisone  15 mg Oral or Feeding Tube Daily     hydrocortisone  5 mg Oral or Feeding Tube Daily at 4 pm     insulin aspart  1-6 Units Subcutaneous Q4H     ipratropium - albuterol 0.5 mg/2.5 mg/3 mL  3 mL Nebulization 4x daily     levothyroxine  150 mcg Per Feeding Tube QA AC      metoclopramide  10 mg Per Feeding Tube 4x Daily AC & HS     multivitamins w/minerals  15 mL Per J Tube Daily     pantoprazole  40 mg Per J Tube Daily     piperacillin-tazobactam  4.5 g Intravenous Q6H     potassium & sodium phosphates  1 packet Per Feeding Tube TID     sodium bicarbonate  650 mg Oral or Feeding Tube BID     vancomycin  1,500 mg Intravenous Q18H                  Physical Exam:   Blood pressure 99/56, pulse 56, temperature 97.5  F (36.4  C), temperature source Axillary, resp. rate 22, height 1.829 m (6'), weight 72.4 kg (159 lb 9.8 oz), SpO2 96 %.  Wt Readings from Last 2 Encounters:   06/20/22 72.4 kg (159 lb 9.8 oz)   06/07/22 71 kg (156 lb 8 oz)     Vital Signs with Ranges  Temp:  [97  F (36.1  C)-97.8  F (36.6  C)] 97.5  F (36.4  C)  Pulse:  [50-96] 56  Resp:  [9-26] 22  BP: ()/(52-90) 99/56  SpO2:  [94 %-100 %] 96 %    Constitutional: Awake, alert, cooperative, no apparent distress looks like usual self   Lungs: Clear to auscultation bilaterally, no crackles or wheezing   Cardiovascular: Regular rate and rhythm, normal S1 and S2, and no murmur noted   Abdomen: Normal bowel sounds, soft, non-distended, non-tender   Skin: No rashes, no cyanosis, no edema   Other:           Data:   All microbiology laboratory data reviewed.  Recent Labs   Lab Test 06/20/22  0532 06/19/22  0546 06/18/22  2111   WBC 8.8 17.6* 15.8*   HGB 10.4* 12.3* 12.4*   HCT 33.4* 37.0* 38.2*   MCV 98 89 92   * 156 128*     Recent Labs   Lab Test 06/20/22  0532 06/19/22  0546 06/19/22  0143   CR 0.73 0.74 0.76     No lab results found.  Recent Labs   Lab Test 05/23/21  0316 05/23/21  0250 04/15/21  2250 02/22/21  1622 02/22/21  1439 02/22/21  1413 02/19/21  1155 02/19/21  1123 01/15/21  0214   CULT No growth No growth No growth No growth No growth No growth No growth No growth >100,000 colonies/mL  Klebsiella pneumoniae  *
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
## 2022-06-21 LAB
ALBUMIN SERPL-MCNC: 2.7 G/DL (ref 3.4–5)
ALP SERPL-CCNC: 105 U/L (ref 40–150)
ALT SERPL W P-5'-P-CCNC: 80 U/L (ref 0–70)
ANION GAP SERPL CALCULATED.3IONS-SCNC: 3 MMOL/L (ref 3–14)
AST SERPL W P-5'-P-CCNC: 76 U/L (ref 0–45)
BILIRUB SERPL-MCNC: 0.5 MG/DL (ref 0.2–1.3)
BUN SERPL-MCNC: 15 MG/DL (ref 7–30)
CALCIUM SERPL-MCNC: 8.4 MG/DL (ref 8.5–10.1)
CHLORIDE BLD-SCNC: 108 MMOL/L (ref 94–109)
CO2 SERPL-SCNC: 28 MMOL/L (ref 20–32)
CREAT SERPL-MCNC: 0.74 MG/DL (ref 0.66–1.25)
ERYTHROCYTE [DISTWIDTH] IN BLOOD BY AUTOMATED COUNT: 14.6 % (ref 10–15)
GFR SERPL CREATININE-BSD FRML MDRD: >90 ML/MIN/1.73M2
GLUCOSE BLD-MCNC: 304 MG/DL (ref 70–99)
GLUCOSE BLDC GLUCOMTR-MCNC: 274 MG/DL (ref 70–99)
GLUCOSE BLDC GLUCOMTR-MCNC: 278 MG/DL (ref 70–99)
GLUCOSE BLDC GLUCOMTR-MCNC: 290 MG/DL (ref 70–99)
GLUCOSE BLDC GLUCOMTR-MCNC: 293 MG/DL (ref 70–99)
GLUCOSE BLDC GLUCOMTR-MCNC: 309 MG/DL (ref 70–99)
GLUCOSE BLDC GLUCOMTR-MCNC: 329 MG/DL (ref 70–99)
GLUCOSE BLDC GLUCOMTR-MCNC: 342 MG/DL (ref 70–99)
HCT VFR BLD AUTO: 34.7 % (ref 40–53)
HGB BLD-MCNC: 11.1 G/DL (ref 13.3–17.7)
MCH RBC QN AUTO: 29.8 PG (ref 26.5–33)
MCHC RBC AUTO-ENTMCNC: 32 G/DL (ref 31.5–36.5)
MCV RBC AUTO: 93 FL (ref 78–100)
PHOSPHATE SERPL-MCNC: 2.4 MG/DL (ref 2.5–4.5)
PLATELET # BLD AUTO: 118 10E3/UL (ref 150–450)
POTASSIUM BLD-SCNC: 3.7 MMOL/L (ref 3.4–5.3)
PROT SERPL-MCNC: 6.8 G/DL (ref 6.8–8.8)
RBC # BLD AUTO: 3.73 10E6/UL (ref 4.4–5.9)
SODIUM SERPL-SCNC: 139 MMOL/L (ref 133–144)
WBC # BLD AUTO: 7.6 10E3/UL (ref 4–11)

## 2022-06-21 PROCEDURE — 94669 MECHANICAL CHEST WALL OSCILL: CPT

## 2022-06-21 PROCEDURE — 94640 AIRWAY INHALATION TREATMENT: CPT

## 2022-06-21 PROCEDURE — 250N000013 HC RX MED GY IP 250 OP 250 PS 637: Performed by: HOSPITALIST

## 2022-06-21 PROCEDURE — 84100 ASSAY OF PHOSPHORUS: CPT | Performed by: HOSPITALIST

## 2022-06-21 PROCEDURE — 250N000009 HC RX 250: Performed by: HOSPITALIST

## 2022-06-21 PROCEDURE — 94668 MNPJ CHEST WALL SBSQ: CPT

## 2022-06-21 PROCEDURE — 258N000003 HC RX IP 258 OP 636: Performed by: HOSPITALIST

## 2022-06-21 PROCEDURE — 36415 COLL VENOUS BLD VENIPUNCTURE: CPT | Performed by: HOSPITALIST

## 2022-06-21 PROCEDURE — 94640 AIRWAY INHALATION TREATMENT: CPT | Mod: 76

## 2022-06-21 PROCEDURE — 250N000011 HC RX IP 250 OP 636: Performed by: HOSPITALIST

## 2022-06-21 PROCEDURE — 80053 COMPREHEN METABOLIC PANEL: CPT | Performed by: HOSPITALIST

## 2022-06-21 PROCEDURE — 85027 COMPLETE CBC AUTOMATED: CPT | Performed by: HOSPITALIST

## 2022-06-21 PROCEDURE — 99232 SBSQ HOSP IP/OBS MODERATE 35: CPT | Performed by: HOSPITALIST

## 2022-06-21 PROCEDURE — 120N000001 HC R&B MED SURG/OB

## 2022-06-21 PROCEDURE — 999N000157 HC STATISTIC RCP TIME EA 10 MIN

## 2022-06-21 PROCEDURE — 999N000190 HC STATISTIC VAT ROUNDS

## 2022-06-21 RX ADMIN — CARBAMAZEPINE 150 MG: 100 SUSPENSION ORAL at 00:12

## 2022-06-21 RX ADMIN — IPRATROPIUM BROMIDE AND ALBUTEROL SULFATE 3 ML: .5; 3 SOLUTION RESPIRATORY (INHALATION) at 20:02

## 2022-06-21 RX ADMIN — ACETYLCYSTEINE 2 ML: 200 SOLUTION ORAL; RESPIRATORY (INHALATION) at 07:32

## 2022-06-21 RX ADMIN — INSULIN ASPART 4 UNITS: 100 INJECTION, SOLUTION INTRAVENOUS; SUBCUTANEOUS at 16:31

## 2022-06-21 RX ADMIN — PIPERACILLIN SODIUM AND TAZOBACTAM SODIUM 4.5 G: 4; .5 INJECTION, POWDER, LYOPHILIZED, FOR SOLUTION INTRAVENOUS at 18:18

## 2022-06-21 RX ADMIN — SODIUM BICARBONATE 650 MG TABLET 650 MG: at 20:25

## 2022-06-21 RX ADMIN — LEVOTHYROXINE SODIUM 150 MCG: 150 TABLET ORAL at 06:59

## 2022-06-21 RX ADMIN — CALCIUM CARBONATE 1250 MG: 1250 SUSPENSION ORAL at 00:11

## 2022-06-21 RX ADMIN — CARBAMAZEPINE 100 MG: 100 SUSPENSION ORAL at 18:27

## 2022-06-21 RX ADMIN — POTASSIUM & SODIUM PHOSPHATES POWDER PACK 280-160-250 MG 1 PACKET: 280-160-250 PACK at 18:27

## 2022-06-21 RX ADMIN — METOCLOPRAMIDE HYDROCHLORIDE 10 MG: 5 SOLUTION ORAL at 20:25

## 2022-06-21 RX ADMIN — ACETYLCYSTEINE 2 ML: 200 SOLUTION ORAL; RESPIRATORY (INHALATION) at 16:01

## 2022-06-21 RX ADMIN — CARBAMAZEPINE 150 MG: 100 SUSPENSION ORAL at 06:55

## 2022-06-21 RX ADMIN — PIPERACILLIN SODIUM AND TAZOBACTAM SODIUM 4.5 G: 4; .5 INJECTION, POWDER, LYOPHILIZED, FOR SOLUTION INTRAVENOUS at 13:14

## 2022-06-21 RX ADMIN — POTASSIUM & SODIUM PHOSPHATES POWDER PACK 280-160-250 MG 1 PACKET: 280-160-250 PACK at 22:53

## 2022-06-21 RX ADMIN — MICONAZOLE NITRATE: 20 POWDER TOPICAL at 20:24

## 2022-06-21 RX ADMIN — HYDROCORTISONE 15 MG: 10 TABLET ORAL at 08:30

## 2022-06-21 RX ADMIN — POTASSIUM & SODIUM PHOSPHATES POWDER PACK 280-160-250 MG 1 PACKET: 280-160-250 PACK at 15:32

## 2022-06-21 RX ADMIN — METOCLOPRAMIDE HYDROCHLORIDE 10 MG: 5 SOLUTION ORAL at 16:32

## 2022-06-21 RX ADMIN — CALCIUM CARBONATE 1250 MG: 1250 SUSPENSION ORAL at 16:32

## 2022-06-21 RX ADMIN — METOCLOPRAMIDE HYDROCHLORIDE 10 MG: 5 SOLUTION ORAL at 13:14

## 2022-06-21 RX ADMIN — ENOXAPARIN SODIUM 40 MG: 40 INJECTION SUBCUTANEOUS at 10:57

## 2022-06-21 RX ADMIN — BRIVARACETAM 100 MG: 10 SOLUTION ORAL at 20:24

## 2022-06-21 RX ADMIN — BARICITINIB 4 MG: 2 TABLET, FILM COATED ORAL at 16:32

## 2022-06-21 RX ADMIN — INSULIN ASPART 5 UNITS: 100 INJECTION, SOLUTION INTRAVENOUS; SUBCUTANEOUS at 21:03

## 2022-06-21 RX ADMIN — POTASSIUM & SODIUM PHOSPHATES POWDER PACK 280-160-250 MG 1 PACKET: 280-160-250 PACK at 08:29

## 2022-06-21 RX ADMIN — INSULIN ASPART 3 UNITS: 100 INJECTION, SOLUTION INTRAVENOUS; SUBCUTANEOUS at 13:27

## 2022-06-21 RX ADMIN — SODIUM CHLORIDE: 9 INJECTION, SOLUTION INTRAVENOUS at 16:33

## 2022-06-21 RX ADMIN — INSULIN ASPART 4 UNITS: 100 INJECTION, SOLUTION INTRAVENOUS; SUBCUTANEOUS at 04:25

## 2022-06-21 RX ADMIN — CALCIUM CARBONATE 1250 MG: 1250 SUSPENSION ORAL at 08:29

## 2022-06-21 RX ADMIN — CARBAMAZEPINE 150 MG: 100 SUSPENSION ORAL at 13:14

## 2022-06-21 RX ADMIN — HYDROCORTISONE 5 MG: 5 TABLET ORAL at 16:32

## 2022-06-21 RX ADMIN — PIPERACILLIN SODIUM AND TAZOBACTAM SODIUM 4.5 G: 4; .5 INJECTION, POWDER, LYOPHILIZED, FOR SOLUTION INTRAVENOUS at 06:56

## 2022-06-21 RX ADMIN — INSULIN ASPART 4 UNITS: 100 INJECTION, SOLUTION INTRAVENOUS; SUBCUTANEOUS at 00:17

## 2022-06-21 RX ADMIN — ASPIRIN 81 MG CHEWABLE TABLET 81 MG: 81 TABLET CHEWABLE at 08:30

## 2022-06-21 RX ADMIN — SODIUM BICARBONATE 650 MG TABLET 650 MG: at 08:30

## 2022-06-21 RX ADMIN — Medication 40 MG: at 08:29

## 2022-06-21 RX ADMIN — METOCLOPRAMIDE HYDROCHLORIDE 10 MG: 5 SOLUTION ORAL at 08:30

## 2022-06-21 RX ADMIN — IPRATROPIUM BROMIDE AND ALBUTEROL SULFATE 3 ML: .5; 3 SOLUTION RESPIRATORY (INHALATION) at 16:01

## 2022-06-21 RX ADMIN — PIPERACILLIN SODIUM AND TAZOBACTAM SODIUM 4.5 G: 4; .5 INJECTION, POWDER, LYOPHILIZED, FOR SOLUTION INTRAVENOUS at 00:12

## 2022-06-21 RX ADMIN — INSULIN ASPART 4 UNITS: 100 INJECTION, SOLUTION INTRAVENOUS; SUBCUTANEOUS at 08:42

## 2022-06-21 RX ADMIN — DEXAMETHASONE SODIUM PHOSPHATE 6 MG: 4 INJECTION, SOLUTION INTRAMUSCULAR; INTRAVENOUS at 13:15

## 2022-06-21 RX ADMIN — CALCIUM CARBONATE 1250 MG: 1250 SUSPENSION ORAL at 20:25

## 2022-06-21 RX ADMIN — IPRATROPIUM BROMIDE AND ALBUTEROL SULFATE 3 ML: .5; 3 SOLUTION RESPIRATORY (INHALATION) at 07:25

## 2022-06-21 RX ADMIN — BRIVARACETAM 100 MG: 10 SOLUTION ORAL at 08:29

## 2022-06-21 RX ADMIN — Medication 15 ML: at 16:32

## 2022-06-21 RX ADMIN — ACETYLCYSTEINE 2 ML: 200 SOLUTION ORAL; RESPIRATORY (INHALATION) at 20:02

## 2022-06-21 ASSESSMENT — ACTIVITIES OF DAILY LIVING (ADL)
ADLS_ACUITY_SCORE: 67
ADLS_ACUITY_SCORE: 65
ADLS_ACUITY_SCORE: 67
ADLS_ACUITY_SCORE: 65
ADLS_ACUITY_SCORE: 67
ADLS_ACUITY_SCORE: 65
ADLS_ACUITY_SCORE: 65
ADLS_ACUITY_SCORE: 67
ADLS_ACUITY_SCORE: 65
ADLS_ACUITY_SCORE: 65
ADLS_ACUITY_SCORE: 67
ADLS_ACUITY_SCORE: 67

## 2022-06-21 NOTE — PHARMACY-VANCOMYCIN DOSING SERVICE
"Pharmacy Vancomycin Note  Date of Service 2022  Patient's  1960   61 year old, male    Indication: Healthcare-Associated Pneumonia  Day of Therapy: 4  Current vancomycin regimen:  1500 mg IV q18h  Current vancomycin monitoring method: AUC  Current vancomycin therapeutic monitoring goal: 400-600 mg*h/L    Insight[employer] Prediction of Current Vancomycin Regimen  Loading dose: N/A  Regimen: 1500 mg IV every 18 hours.  Start time: 20:17 on 2022  Exposure target: AUC24 (range)400-600 mg/L.hr   AUC24,ss: 528 mg/L.hr  Probability of AUC24 > 400: 96 %  Ctrough,ss: 14.9 mg/L  Probability of Ctrough,ss > 20: 8 %  Probability of nephrotoxicity (Lodise ERNESTO ): 10 %      Current estimated CrCl = Estimated Creatinine Clearance: 111.6 mL/min (based on SCr of 0.73 mg/dL).    Creatinine for last 3 days  2022:  3:27 AM Creatinine 0.84 mg/dL;  9:11 PM Creatinine 0.73 mg/dL  2022:  1:43 AM Creatinine 0.76 mg/dL;  5:46 AM Creatinine 0.74 mg/dL  2022:  5:32 AM Creatinine 0.73 mg/dL    Recent Vancomycin Levels (past 3 days)  2022:  6:55 PM Vancomycin 15.8 mg/L    Vancomycin IV Administrations (past 72 hours)                   vancomycin 1500 mg in 0.9% NaCl 250 ml intermittent infusion 1,500 mg (mg) 1,500 mg New Bag 22 0217     1,500 mg New Bag 22 0741    vancomycin 1500 mg in 0.9% NaCl 250 ml intermittent infusion 1,500 mg (mg) 1,500 mg New Bag 22 1657                Nephrotoxins and other renal medications (From now, onward)    Start     Dose/Rate Route Frequency Ordered Stop    22 0800  vancomycin 1500 mg in 0.9% NaCl 250 ml intermittent infusion 1,500 mg         1,500 mg  over 90 Minutes Intravenous EVERY 18 HOURS 22 1334      22 1330  piperacillin-tazobactam (ZOSYN) 4.5 g vial to attach to  mL bag        Note to Pharmacy: For [facility], [facility] and [facility]: For Zosyn-naive patients, use the \"Zosyn initial dose + extended infusion\" order panel.    4.5 g  over 30 " Minutes Intravenous EVERY 6 HOURS 06/18/22 1306               Contrast Orders - past 72 hours (72h ago, onward)    Start     Dose/Rate Route Frequency Stop    06/19/22 1330  perflutren diluted 1mL to 2mL with saline (OPTISON) diluted injection 5 mL         5 mL Intravenous ONCE 06/19/22 1307    06/18/22 2200  iopamidol (ISOVUE-370) solution 63 mL         63 mL Intravenous ONCE 06/18/22 2214          Interpretation of levels and current regimen:  Vancomycin level is reflective of -600    Has serum creatinine changed greater than 50% in last 72 hours: No    Urine output:  unable to determine    Renal Function: Stable        Plan:  1. Continue Current Dose  2. Vancomycin monitoring method: AUC  3. Vancomycin therapeutic monitoring goal: 400-600 mg*h/L  4. Pharmacy will check vancomycin levels as appropriate in 3-5 Days.  5. Serum creatinine levels will be ordered daily for the first week of therapy and at least twice weekly for subsequent weeks.    Manish Jenkins RP
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
## 2022-06-21 NOTE — CONSULTS
Care Management Initial Consult  Plan of care and discharge plans discussed with patients mother Savannah.  Savannah states patient has PCA services through [facility]. Patient has [employer] waiver approved  for   12 hrs of RN services but unable to get one due to staffing shortage. Patient has 12 hrs of PCA services at night and   patient's mother cares for patient during the day.  Patient now has an electric lift thatmother is renting and will be reimbursed by CADI Waiver eventually. Pt has a male that comes in 3x per week for 4 hours and plays games with pt.  Pt also has a  two days per week. Noted patient now has a PCP through [facility].  Plan for discharge to home with resumption of services when medically stable.    General Information  Assessment completed with: Parents, Mother  Type of CM/SW Visit: Offer D/C Planning    Primary Care Provider verified and updated as needed: Yes   Readmission within the last 30 days: previous discharge plan unsuccessful      Reason for Consult: discharge planning  Advance Care Planning:            Communication Assessment  Patient's communication style: spoken language (English or Bilingual)    Hearing Difficulty or Deaf: no   Wear Glasses or Blind: no    Cognitive  Cognitive/Neuro/Behavioral: .WDL except  Level of Consciousness: lethargic  Arousal Level: opens eyes spontaneously  Orientation: other (see comments) (HECTOR, nonverbal)  Mood/Behavior: calm, cooperative  Best Language: 2 - Severe aphasia  Speech: incomprehensible sounds    Living Environment:   People in home: parent(s), other (see comments) (and caregiver)     Current living Arrangements: house      Able to return to prior arrangements:         Family/Social Support:  Care provided by: homecare agency, parent(s)  Provides care for: no one, unable/limited ability to care for self  Marital Status: Single  Parent(s)          Description of Support System: Involved    Support Assessment: Adequate family and  caregiver support    Current Resources:   Patient receiving home care services: Yes  Skilled Home Care Services: Home Health Aid  Community Resources: County Worker, Home Infusion, PCA  Equipment currently used at home: hospital bed, lift device  Supplies currently used at home:      Employment/Financial:  Employment Status: disabled        Financial Concerns:             Lifestyle & Psychosocial Needs:  Social Determinants of Health     Tobacco Use: Medium Risk     Smoking Tobacco Use: Former Smoker     Smokeless Tobacco Use: Never Used   Alcohol Use: Not on file   Financial Resource Strain: Not on file   Food Insecurity: Not on file   Transportation Needs: Not on file   Physical Activity: Not on file   Stress: Not on file   Social Connections: Not on file   Intimate Partner Violence: Not on file   Depression: Not at risk     PHQ-2 Score: 0   Housing Stability: Not on file       Functional Status:  Prior to admission patient needed assistance:   Dependent ADLs:: Bathing, Dressing, Eating, Grooming, Incontinence, Positioning, Transfers  Dependent IADLs:: Medication Management, Transportation, Laundry, Incontinence  Assesssment of Functional Status: At functional baseline    Mental Health Status:          Chemical Dependency Status:                Values/Beliefs:  Spiritual, Cultural Beliefs, Muslim Practices, Values that affect care: elly Casillas RN  Inpatient Care Coordinator  [facility]achel

## 2022-06-21 NOTE — PROGRESS NOTES
[facility]  Infectious Disease Progress Note          Assessment and Plan:   IMPRESSION:     1.  A 61-year-old male, very well known to us, including recent admission and discharge with strep bacteremia, now admitted with respiratory insufficiency, COVID-19 positive, also possible pneumonia or aspiration as part of diagnosis.  2.  Recent Strep salivarius bacteremia, transesophageal echocardiogram negative, but in the midst of an extended IV course.  3.  History of numerous admissions, frequently as monthly with aspiration, sepsis, often Pseudomonas colonized sputum as cause.  4.  Traumatic brain injury with major chronic neurologic abnormalities.  5.  COVID-19, now being treated.  6.  MRSA (methicillin-resistant Staphylococcus aureus) and VRE (vancomycin-resistant Enterococcus) colonization.     RECOMMENDATIONS:     1.  Continue current broad antibiotics. No MRSA in any cultures so narrow to zosyn alone this still covers the previous strep in the blood cultures.   2.  Treat COVID-19 as we are doing.  3.  Ongoing discussions with family regarding level of care.  Although now again (as historically commonly) much better , O2 better T down        Interval History:   no new complaints looks Ok O2 better T down cxs pending Bc neg              Medications:       acetylcysteine  2 mL Nebulization 4x Daily     aspirin  81 mg Oral or Feeding Tube Daily     baricitinib  4 mg Oral Daily     Brivaracetam  100 mg Oral or Feeding Tube BID     calcium carbonate  1,250 mg Oral or Feeding Tube TID     carBAMazepine  100 mg Oral or Feeding Tube Daily     carBAMazepine  150 mg Oral or Feeding Tube TID     dexamethasone  6 mg Intravenous Q24H     enoxaparin ANTICOAGULANT  40 mg Subcutaneous Q24H     hydrocortisone  15 mg Oral or Feeding Tube Daily     hydrocortisone  5 mg Oral or Feeding Tube Daily at 4 pm     insulin aspart  1-6 Units Subcutaneous Q4H     ipratropium - albuterol 0.5 mg/2.5 mg/3 mL  3 mL  Nebulization 4x daily     levothyroxine  150 mcg Per Feeding Tube QAM AC     metoclopramide  10 mg Per Feeding Tube 4x Daily AC & HS     multivitamins w/minerals  15 mL Per J Tube Daily     pantoprazole  40 mg Per J Tube Daily     piperacillin-tazobactam  4.5 g Intravenous Q6H     potassium & sodium phosphates  1 packet Per Feeding Tube TID     sodium bicarbonate  650 mg Oral or Feeding Tube BID                  Physical Exam:   Blood pressure 129/66, pulse 62, temperature 97.8  F (36.6  C), temperature source Axillary, resp. rate 18, height 1.829 m (6'), weight 72.4 kg (159 lb 9.8 oz), SpO2 95 %.  Wt Readings from Last 2 Encounters:   06/20/22 72.4 kg (159 lb 9.8 oz)   06/07/22 71 kg (156 lb 8 oz)     Vital Signs with Ranges  Temp:  [97.4  F (36.3  C)-98.2  F (36.8  C)] 97.8  F (36.6  C)  Pulse:  [56-79] 62  Resp:  [9-22] 18  BP: ()/(52-75) 129/66  SpO2:  [95 %-98 %] 95 %    Constitutional: Awake, alert, cooperative, no apparent distress looks like usual self   Lungs: Clear to auscultation bilaterally, no crackles or wheezing   Cardiovascular: Regular rate and rhythm, normal S1 and S2, and no murmur noted   Abdomen: Normal bowel sounds, soft, non-distended, non-tender   Skin: No rashes, no cyanosis, no edema   Other:           Data:   All microbiology laboratory data reviewed.  Recent Labs   Lab Test 06/20/22  0532 06/19/22  0546 06/18/22  2111   WBC 8.8 17.6* 15.8*   HGB 10.4* 12.3* 12.4*   HCT 33.4* 37.0* 38.2*   MCV 98 89 92   * 156 128*     Recent Labs   Lab Test 06/20/22  0532 06/19/22  0546 06/19/22  0143   CR 0.73 0.74 0.76     No lab results found.  Recent Labs   Lab Test 05/23/21  0316 05/23/21  0250 04/15/21  2250 02/22/21  1622 02/22/21  1439 02/22/21  1413 02/19/21  1155 02/19/21  1123 01/15/21  0214   CULT No growth No growth No growth No growth No growth No growth No growth No growth >100,000 colonies/mL  Klebsiella pneumoniae  *

## 2022-06-22 LAB
ALBUMIN SERPL-MCNC: 2.8 G/DL (ref 3.4–5)
ALP SERPL-CCNC: 101 U/L (ref 40–150)
ALT SERPL W P-5'-P-CCNC: 66 U/L (ref 0–70)
ANION GAP SERPL CALCULATED.3IONS-SCNC: 8 MMOL/L (ref 3–14)
AST SERPL W P-5'-P-CCNC: 47 U/L (ref 0–45)
BACTERIA BLD CULT: NO GROWTH
BACTERIA BLD CULT: NO GROWTH
BILIRUB SERPL-MCNC: 0.5 MG/DL (ref 0.2–1.3)
BUN SERPL-MCNC: 19 MG/DL (ref 7–30)
CALCIUM SERPL-MCNC: 8.1 MG/DL (ref 8.5–10.1)
CHLORIDE BLD-SCNC: 104 MMOL/L (ref 94–109)
CO2 SERPL-SCNC: 25 MMOL/L (ref 20–32)
CREAT SERPL-MCNC: 0.96 MG/DL (ref 0.66–1.25)
ERYTHROCYTE [DISTWIDTH] IN BLOOD BY AUTOMATED COUNT: 14.3 % (ref 10–15)
GFR SERPL CREATININE-BSD FRML MDRD: >90 ML/MIN/1.73M2
GLUCOSE BLD-MCNC: 175 MG/DL (ref 70–99)
GLUCOSE BLDC GLUCOMTR-MCNC: 123 MG/DL (ref 70–99)
GLUCOSE BLDC GLUCOMTR-MCNC: 125 MG/DL (ref 70–99)
GLUCOSE BLDC GLUCOMTR-MCNC: 222 MG/DL (ref 70–99)
GLUCOSE BLDC GLUCOMTR-MCNC: 284 MG/DL (ref 70–99)
GLUCOSE BLDC GLUCOMTR-MCNC: 313 MG/DL (ref 70–99)
GLUCOSE BLDC GLUCOMTR-MCNC: 368 MG/DL (ref 70–99)
HCT VFR BLD AUTO: 36.1 % (ref 40–53)
HGB BLD-MCNC: 11.6 G/DL (ref 13.3–17.7)
MCH RBC QN AUTO: 29.4 PG (ref 26.5–33)
MCHC RBC AUTO-ENTMCNC: 32.1 G/DL (ref 31.5–36.5)
MCV RBC AUTO: 92 FL (ref 78–100)
PHOSPHATE SERPL-MCNC: 2.1 MG/DL (ref 2.5–4.5)
PLATELET # BLD AUTO: 115 10E3/UL (ref 150–450)
POTASSIUM BLD-SCNC: 3.3 MMOL/L (ref 3.4–5.3)
POTASSIUM BLD-SCNC: 4.5 MMOL/L (ref 3.4–5.3)
PROT SERPL-MCNC: 7.3 G/DL (ref 6.8–8.8)
RBC # BLD AUTO: 3.94 10E6/UL (ref 4.4–5.9)
SODIUM SERPL-SCNC: 137 MMOL/L (ref 133–144)
WBC # BLD AUTO: 8.4 10E3/UL (ref 4–11)

## 2022-06-22 PROCEDURE — 99232 SBSQ HOSP IP/OBS MODERATE 35: CPT | Performed by: HOSPITALIST

## 2022-06-22 PROCEDURE — 94640 AIRWAY INHALATION TREATMENT: CPT

## 2022-06-22 PROCEDURE — 250N000011 HC RX IP 250 OP 636: Performed by: HOSPITALIST

## 2022-06-22 PROCEDURE — 80053 COMPREHEN METABOLIC PANEL: CPT | Performed by: HOSPITALIST

## 2022-06-22 PROCEDURE — 999N000157 HC STATISTIC RCP TIME EA 10 MIN

## 2022-06-22 PROCEDURE — 250N000009 HC RX 250: Performed by: HOSPITALIST

## 2022-06-22 PROCEDURE — 84132 ASSAY OF SERUM POTASSIUM: CPT | Performed by: HOSPITALIST

## 2022-06-22 PROCEDURE — 36415 COLL VENOUS BLD VENIPUNCTURE: CPT | Performed by: HOSPITALIST

## 2022-06-22 PROCEDURE — 82040 ASSAY OF SERUM ALBUMIN: CPT | Performed by: HOSPITALIST

## 2022-06-22 PROCEDURE — 250N000013 HC RX MED GY IP 250 OP 250 PS 637: Performed by: HOSPITALIST

## 2022-06-22 PROCEDURE — 84100 ASSAY OF PHOSPHORUS: CPT | Performed by: HOSPITALIST

## 2022-06-22 PROCEDURE — 999N000190 HC STATISTIC VAT ROUNDS

## 2022-06-22 PROCEDURE — 94640 AIRWAY INHALATION TREATMENT: CPT | Mod: 76

## 2022-06-22 PROCEDURE — 250N000009 HC RX 250

## 2022-06-22 PROCEDURE — 85027 COMPLETE CBC AUTOMATED: CPT | Performed by: HOSPITALIST

## 2022-06-22 PROCEDURE — 120N000001 HC R&B MED SURG/OB

## 2022-06-22 RX ORDER — IBUPROFEN 100 MG/5ML
400 SUSPENSION, ORAL (FINAL DOSE FORM) ORAL EVERY 6 HOURS PRN
Status: DISCONTINUED | OUTPATIENT
Start: 2022-06-22 | End: 2022-07-01 | Stop reason: HOSPADM

## 2022-06-22 RX ORDER — POTASSIUM CHLORIDE 1.5 G/1.58G
40 POWDER, FOR SOLUTION ORAL ONCE
Status: COMPLETED | OUTPATIENT
Start: 2022-06-22 | End: 2022-06-22

## 2022-06-22 RX ADMIN — ACETYLCYSTEINE 2 ML: 200 SOLUTION ORAL; RESPIRATORY (INHALATION) at 12:08

## 2022-06-22 RX ADMIN — CARBAMAZEPINE 150 MG: 100 SUSPENSION ORAL at 12:21

## 2022-06-22 RX ADMIN — PIPERACILLIN SODIUM AND TAZOBACTAM SODIUM 4.5 G: 4; .5 INJECTION, POWDER, LYOPHILIZED, FOR SOLUTION INTRAVENOUS at 18:46

## 2022-06-22 RX ADMIN — HYDROCORTISONE 5 MG: 5 TABLET ORAL at 17:26

## 2022-06-22 RX ADMIN — SODIUM BICARBONATE 650 MG TABLET 650 MG: at 20:47

## 2022-06-22 RX ADMIN — CALCIUM CARBONATE 1250 MG: 1250 SUSPENSION ORAL at 08:12

## 2022-06-22 RX ADMIN — CALCIUM CARBONATE 1250 MG: 1250 SUSPENSION ORAL at 20:47

## 2022-06-22 RX ADMIN — CALCIUM CARBONATE 1250 MG: 1250 SUSPENSION ORAL at 17:26

## 2022-06-22 RX ADMIN — CARBAMAZEPINE 150 MG: 100 SUSPENSION ORAL at 06:10

## 2022-06-22 RX ADMIN — MICONAZOLE NITRATE: 20 POWDER TOPICAL at 08:12

## 2022-06-22 RX ADMIN — PIPERACILLIN SODIUM AND TAZOBACTAM SODIUM 4.5 G: 4; .5 INJECTION, POWDER, LYOPHILIZED, FOR SOLUTION INTRAVENOUS at 06:10

## 2022-06-22 RX ADMIN — ACETYLCYSTEINE 2 ML: 200 SOLUTION ORAL; RESPIRATORY (INHALATION) at 15:47

## 2022-06-22 RX ADMIN — METOCLOPRAMIDE HYDROCHLORIDE 10 MG: 5 SOLUTION ORAL at 20:47

## 2022-06-22 RX ADMIN — METOCLOPRAMIDE HYDROCHLORIDE 10 MG: 5 SOLUTION ORAL at 08:12

## 2022-06-22 RX ADMIN — IPRATROPIUM BROMIDE AND ALBUTEROL SULFATE 3 ML: .5; 3 SOLUTION RESPIRATORY (INHALATION) at 15:48

## 2022-06-22 RX ADMIN — PIPERACILLIN SODIUM AND TAZOBACTAM SODIUM 4.5 G: 4; .5 INJECTION, POWDER, LYOPHILIZED, FOR SOLUTION INTRAVENOUS at 00:25

## 2022-06-22 RX ADMIN — METOCLOPRAMIDE HYDROCHLORIDE 10 MG: 5 SOLUTION ORAL at 12:21

## 2022-06-22 RX ADMIN — LEVOTHYROXINE SODIUM 150 MCG: 150 TABLET ORAL at 06:16

## 2022-06-22 RX ADMIN — Medication 15 ML: at 17:27

## 2022-06-22 RX ADMIN — IBUPROFEN 400 MG: 200 SUSPENSION ORAL at 13:18

## 2022-06-22 RX ADMIN — INSULIN ASPART 3 UNITS: 100 INJECTION, SOLUTION INTRAVENOUS; SUBCUTANEOUS at 00:32

## 2022-06-22 RX ADMIN — CARBAMAZEPINE 100 MG: 100 SUSPENSION ORAL at 18:47

## 2022-06-22 RX ADMIN — METOCLOPRAMIDE HYDROCHLORIDE 10 MG: 5 SOLUTION ORAL at 17:26

## 2022-06-22 RX ADMIN — SODIUM BICARBONATE 650 MG TABLET 650 MG: at 08:12

## 2022-06-22 RX ADMIN — IPRATROPIUM BROMIDE AND ALBUTEROL SULFATE 3 ML: .5; 3 SOLUTION RESPIRATORY (INHALATION) at 08:12

## 2022-06-22 RX ADMIN — ASPIRIN 81 MG CHEWABLE TABLET 81 MG: 81 TABLET CHEWABLE at 08:12

## 2022-06-22 RX ADMIN — ACETAMINOPHEN 650 MG: 325 SUSPENSION ORAL at 08:23

## 2022-06-22 RX ADMIN — PIPERACILLIN SODIUM AND TAZOBACTAM SODIUM 4.5 G: 4; .5 INJECTION, POWDER, LYOPHILIZED, FOR SOLUTION INTRAVENOUS at 12:14

## 2022-06-22 RX ADMIN — BRIVARACETAM 100 MG: 10 SOLUTION ORAL at 20:47

## 2022-06-22 RX ADMIN — CARBAMAZEPINE 150 MG: 100 SUSPENSION ORAL at 00:25

## 2022-06-22 RX ADMIN — HYDROCORTISONE 15 MG: 10 TABLET ORAL at 08:12

## 2022-06-22 RX ADMIN — POTASSIUM CHLORIDE 40 MEQ: 1.5 POWDER, FOR SOLUTION ORAL at 12:21

## 2022-06-22 RX ADMIN — DEXAMETHASONE SODIUM PHOSPHATE 6 MG: 4 INJECTION, SOLUTION INTRAMUSCULAR; INTRAVENOUS at 12:16

## 2022-06-22 RX ADMIN — BARICITINIB 4 MG: 2 TABLET, FILM COATED ORAL at 17:26

## 2022-06-22 RX ADMIN — ACETYLCYSTEINE 2 ML: 200 SOLUTION ORAL; RESPIRATORY (INHALATION) at 19:41

## 2022-06-22 RX ADMIN — Medication 40 MG: at 08:12

## 2022-06-22 RX ADMIN — MICONAZOLE NITRATE: 20 POWDER TOPICAL at 20:47

## 2022-06-22 RX ADMIN — INSULIN ASPART 2 UNITS: 100 INJECTION, SOLUTION INTRAVENOUS; SUBCUTANEOUS at 12:19

## 2022-06-22 RX ADMIN — IPRATROPIUM BROMIDE AND ALBUTEROL SULFATE 3 ML: .5; 3 SOLUTION RESPIRATORY (INHALATION) at 19:41

## 2022-06-22 RX ADMIN — ACETYLCYSTEINE 2 ML: 200 SOLUTION ORAL; RESPIRATORY (INHALATION) at 08:13

## 2022-06-22 RX ADMIN — ENOXAPARIN SODIUM 40 MG: 40 INJECTION SUBCUTANEOUS at 11:10

## 2022-06-22 RX ADMIN — IPRATROPIUM BROMIDE AND ALBUTEROL SULFATE 3 ML: .5; 3 SOLUTION RESPIRATORY (INHALATION) at 12:18

## 2022-06-22 RX ADMIN — BRIVARACETAM 100 MG: 10 SOLUTION ORAL at 08:12

## 2022-06-22 ASSESSMENT — ACTIVITIES OF DAILY LIVING (ADL)
ADLS_ACUITY_SCORE: 61
ADLS_ACUITY_SCORE: 67
ADLS_ACUITY_SCORE: 61

## 2022-06-22 NOTE — PROGRESS NOTES
CLINICAL NUTRITION SERVICES - REASSESSMENT NOTE    Recommendations Ordered by Registered Dietitian (RD):   Change back to usual formula as follows -->   Enteral Frequency:  22 hr cycle  Enteral Regimen: Jevity 1.5 at 60 mL/hr x 22 hours per day (holding TF 1 hour before and 1 hour after Synthroid dose in AM)  Total Enteral Provisions: 1980 kcal (27 kcal/kg), 84 g protein (1.2 g/kg), 28 g fiber, 1003 mL H2O  Free Water Flush: 60 mL q 4 hrs (increase to 160 mL q 4 hrs per MD)   Malnutrition:   (6/18)  % Weight Loss:  None noted  % Intake:  Decreased intake does not meet criteria for malnutrition   Subcutaneous Fat Loss:  (5/12/22 - RD assessment) Orbital region moderate depletion- baseline  Muscle Loss:  (5/12/22 - RD Assessment) Temporal region moderate depletion and Clavicle bone region moderate depletion - baseline  Fluid Retention:  None noted     Malnutrition Diagnosis: Patient does not meet two of the above criteria necessary for diagnosing malnutrition at this time      EVALUATION OF PROGRESS TOWARD GOALS   Diet: NPO - baseline    Nutrition Support: TF at goal as follows -   Type of Feeding Tube: Jejunostomy  Enteral Frequency:  22 hr cycle  Enteral Regimen: Osmolite 1.5 at 60 mL/hr x 22 hrs = 1980 kcal (27 kcal/kg), 83 g protein (1.1 g/kg & 93% estimated needs), 269 g CHO, 0 g fiber and 1005 mL free water  Free Water Flush: 60 mL q 4 hrs     Intake/Tolerance:   - Tolerating TF @ goal.   - Labs reviewed:   Phos 2.4 (L, up from 1.8 - L yesterday) - replacements given per protocol - pt received NeutraPhos TID at baseline as well.   Na NL, K NL  - -324 (elevated) - Medium sliding scale insulin   - Stooling: BM x1 yesterday.   - Weight: 65.9 kg today, lowest of admission. Appears down from baseline, though weights may fluctuate in this patient and have been difficult to assess in the past. May range from 68-75 kg or so. Will continue to monitor so determine accuracy of fluid shift.     - Meds: Liquid  multivitamin, Neurtraphos TID (baseline), Reglan 10 mg 4x daily    ASSESSED NUTRITION NEEDS:  Dosing Weight 74 kg -admit   Estimated Energy Needs: 2958-0228 kcals (25-30 Kcal/Kg)  Justification: maintenance  Estimated Protein Needs:  grams protein (1.2-1.5 g pro/Kg)  Justification: preservation of lean body mass  Estimated Fluid Needs: per MD      NEW FINDINGS:   - Febrile up to 102 this morning     Previous Goals:   EN to meet % estimated needs   Evaluation: Met    Previous Nutrition Diagnosis:   Inadequate protein-energy intake related to TF paused for transfer as evidenced by receiving 0% nutrient needs x1-2 days  Evaluation: Completed    CURRENT NUTRITION DIAGNOSIS  No nutrition diagnosis identified at this time     INTERVENTIONS  Recommendations / Nutrition Prescription  NPO    Change back to usual formula as follows -->   Enteral Frequency:  22 hr cycle  Enteral Regimen: Jevity 1.5 at 60 mL/hr x 22 hours per day (holding TF 1 hour before and 1 hour after Synthroid dose in AM)  Total Enteral Provisions: 1980 kcal (27 kcal/kg), 84 g protein (1.2 g/kg), 28 g fiber, 1003 mL H2O  Free Water Flush: 60 mL q 4 hrs (increase to 160 mL q 4 hrs per MD)    Implementation  None new    Goals  EN to meet % estimated needs    MONITORING AND EVALUATION:  Progress towards goals will be monitored and evaluated per protocol and Practice Guidelines    Marisel Fernández RD, LD  [facility], [facility], [facility]  Pager: [phone]  Weekend Pager: [phone]

## 2022-06-22 NOTE — PROVIDER NOTIFICATION
MD Notification    Notified Person: MD    Notified Person Name: Dr. Schmitt     Notification Date/Time: 6/22/22 7685    Notification Interaction: paged MD     Purpose of Notification: K 3.3, not on protocol, do you want to order?     Orders Received: RN managed K replacement protocol ordered    Comments: Will replace and recheck per protocol
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
## 2022-06-22 NOTE — PROVIDER NOTIFICATION
MD Notification    Notified Person: MD    Notified Person Name: Dr. Schmitt    Notification Date/Time: 6/22/22 9640    Notification Interaction: paged/spoke with MD     Purpose of Notification: Temp 102.0 rectally, giving Tylenol. Anything else?     Orders Received: no additional orders    Comments: Temp 101.5 after Tylenol, MD notified again, PRN ibuprofen ordered.

## 2022-06-23 LAB
ALBUMIN SERPL-MCNC: 2.7 G/DL (ref 3.4–5)
ALP SERPL-CCNC: 93 U/L (ref 40–150)
ALT SERPL W P-5'-P-CCNC: 48 U/L (ref 0–70)
ANION GAP SERPL CALCULATED.3IONS-SCNC: 4 MMOL/L (ref 3–14)
AST SERPL W P-5'-P-CCNC: 26 U/L (ref 0–45)
BILIRUB SERPL-MCNC: 0.4 MG/DL (ref 0.2–1.3)
BUN SERPL-MCNC: 16 MG/DL (ref 7–30)
CALCIUM SERPL-MCNC: 8.7 MG/DL (ref 8.5–10.1)
CHLORIDE BLD-SCNC: 102 MMOL/L (ref 94–109)
CO2 SERPL-SCNC: 29 MMOL/L (ref 20–32)
CREAT SERPL-MCNC: 0.83 MG/DL (ref 0.66–1.25)
ERYTHROCYTE [DISTWIDTH] IN BLOOD BY AUTOMATED COUNT: 14.1 % (ref 10–15)
GFR SERPL CREATININE-BSD FRML MDRD: >90 ML/MIN/1.73M2
GLUCOSE BLD-MCNC: 238 MG/DL (ref 70–99)
GLUCOSE BLDC GLUCOMTR-MCNC: 147 MG/DL (ref 70–99)
GLUCOSE BLDC GLUCOMTR-MCNC: 200 MG/DL (ref 70–99)
GLUCOSE BLDC GLUCOMTR-MCNC: 230 MG/DL (ref 70–99)
GLUCOSE BLDC GLUCOMTR-MCNC: 237 MG/DL (ref 70–99)
GLUCOSE BLDC GLUCOMTR-MCNC: 284 MG/DL (ref 70–99)
GLUCOSE BLDC GLUCOMTR-MCNC: 316 MG/DL (ref 70–99)
HCT VFR BLD AUTO: 34.9 % (ref 40–53)
HGB BLD-MCNC: 11.2 G/DL (ref 13.3–17.7)
MCH RBC QN AUTO: 29.6 PG (ref 26.5–33)
MCHC RBC AUTO-ENTMCNC: 32.1 G/DL (ref 31.5–36.5)
MCV RBC AUTO: 92 FL (ref 78–100)
MRSA DNA SPEC QL NAA+PROBE: POSITIVE
PHOSPHATE SERPL-MCNC: 2.3 MG/DL (ref 2.5–4.5)
PLATELET # BLD AUTO: 111 10E3/UL (ref 150–450)
POTASSIUM BLD-SCNC: 3.8 MMOL/L (ref 3.4–5.3)
PROT SERPL-MCNC: 7.3 G/DL (ref 6.8–8.8)
RBC # BLD AUTO: 3.78 10E6/UL (ref 4.4–5.9)
SA TARGET DNA: POSITIVE
SODIUM SERPL-SCNC: 135 MMOL/L (ref 133–144)
WBC # BLD AUTO: 7.8 10E3/UL (ref 4–11)

## 2022-06-23 PROCEDURE — 84100 ASSAY OF PHOSPHORUS: CPT | Performed by: HOSPITALIST

## 2022-06-23 PROCEDURE — 87081 CULTURE SCREEN ONLY: CPT | Performed by: INTERNAL MEDICINE

## 2022-06-23 PROCEDURE — 94640 AIRWAY INHALATION TREATMENT: CPT | Mod: 76

## 2022-06-23 PROCEDURE — 250N000009 HC RX 250: Performed by: HOSPITALIST

## 2022-06-23 PROCEDURE — 999N000190 HC STATISTIC VAT ROUNDS

## 2022-06-23 PROCEDURE — 250N000011 HC RX IP 250 OP 636: Performed by: HOSPITALIST

## 2022-06-23 PROCEDURE — 999N000157 HC STATISTIC RCP TIME EA 10 MIN

## 2022-06-23 PROCEDURE — 250N000013 HC RX MED GY IP 250 OP 250 PS 637: Performed by: HOSPITALIST

## 2022-06-23 PROCEDURE — 258N000003 HC RX IP 258 OP 636: Performed by: HOSPITALIST

## 2022-06-23 PROCEDURE — 120N000001 HC R&B MED SURG/OB

## 2022-06-23 PROCEDURE — G0463 HOSPITAL OUTPT CLINIC VISIT: HCPCS

## 2022-06-23 PROCEDURE — 85014 HEMATOCRIT: CPT | Performed by: HOSPITALIST

## 2022-06-23 PROCEDURE — 82310 ASSAY OF CALCIUM: CPT | Performed by: HOSPITALIST

## 2022-06-23 PROCEDURE — 87641 MR-STAPH DNA AMP PROBE: CPT | Performed by: INTERNAL MEDICINE

## 2022-06-23 PROCEDURE — 36415 COLL VENOUS BLD VENIPUNCTURE: CPT | Performed by: HOSPITALIST

## 2022-06-23 PROCEDURE — 99232 SBSQ HOSP IP/OBS MODERATE 35: CPT | Performed by: HOSPITALIST

## 2022-06-23 PROCEDURE — 94640 AIRWAY INHALATION TREATMENT: CPT

## 2022-06-23 RX ADMIN — PIPERACILLIN SODIUM AND TAZOBACTAM SODIUM 4.5 G: 4; .5 INJECTION, POWDER, LYOPHILIZED, FOR SOLUTION INTRAVENOUS at 06:35

## 2022-06-23 RX ADMIN — METOCLOPRAMIDE HYDROCHLORIDE 10 MG: 5 SOLUTION ORAL at 20:17

## 2022-06-23 RX ADMIN — IPRATROPIUM BROMIDE AND ALBUTEROL SULFATE 3 ML: .5; 3 SOLUTION RESPIRATORY (INHALATION) at 20:33

## 2022-06-23 RX ADMIN — DEXAMETHASONE SODIUM PHOSPHATE 6 MG: 4 INJECTION, SOLUTION INTRAMUSCULAR; INTRAVENOUS at 13:30

## 2022-06-23 RX ADMIN — ACETYLCYSTEINE 2 ML: 200 SOLUTION ORAL; RESPIRATORY (INHALATION) at 09:01

## 2022-06-23 RX ADMIN — ACETYLCYSTEINE 2 ML: 200 SOLUTION ORAL; RESPIRATORY (INHALATION) at 11:48

## 2022-06-23 RX ADMIN — SODIUM BICARBONATE 650 MG TABLET 650 MG: at 20:17

## 2022-06-23 RX ADMIN — BRIVARACETAM 100 MG: 10 SOLUTION ORAL at 20:17

## 2022-06-23 RX ADMIN — BRIVARACETAM 100 MG: 10 SOLUTION ORAL at 08:00

## 2022-06-23 RX ADMIN — HYDROCORTISONE 15 MG: 10 TABLET ORAL at 08:00

## 2022-06-23 RX ADMIN — METOCLOPRAMIDE HYDROCHLORIDE 10 MG: 5 SOLUTION ORAL at 11:31

## 2022-06-23 RX ADMIN — IPRATROPIUM BROMIDE AND ALBUTEROL SULFATE 3 ML: .5; 3 SOLUTION RESPIRATORY (INHALATION) at 09:02

## 2022-06-23 RX ADMIN — IPRATROPIUM BROMIDE AND ALBUTEROL SULFATE 3 ML: .5; 3 SOLUTION RESPIRATORY (INHALATION) at 16:22

## 2022-06-23 RX ADMIN — CARBAMAZEPINE 150 MG: 100 SUSPENSION ORAL at 11:23

## 2022-06-23 RX ADMIN — CARBAMAZEPINE 150 MG: 100 SUSPENSION ORAL at 00:30

## 2022-06-23 RX ADMIN — SODIUM CHLORIDE: 9 INJECTION, SOLUTION INTRAVENOUS at 01:18

## 2022-06-23 RX ADMIN — PIPERACILLIN SODIUM AND TAZOBACTAM SODIUM 4.5 G: 4; .5 INJECTION, POWDER, LYOPHILIZED, FOR SOLUTION INTRAVENOUS at 00:26

## 2022-06-23 RX ADMIN — ENOXAPARIN SODIUM 40 MG: 40 INJECTION SUBCUTANEOUS at 11:31

## 2022-06-23 RX ADMIN — MICONAZOLE NITRATE: 20 POWDER TOPICAL at 08:11

## 2022-06-23 RX ADMIN — BARICITINIB 4 MG: 2 TABLET, FILM COATED ORAL at 16:54

## 2022-06-23 RX ADMIN — Medication 40 MG: at 08:05

## 2022-06-23 RX ADMIN — ACETYLCYSTEINE 2 ML: 200 SOLUTION ORAL; RESPIRATORY (INHALATION) at 20:34

## 2022-06-23 RX ADMIN — CALCIUM CARBONATE 1250 MG: 1250 SUSPENSION ORAL at 16:43

## 2022-06-23 RX ADMIN — MICONAZOLE NITRATE: 20 POWDER TOPICAL at 20:18

## 2022-06-23 RX ADMIN — METOCLOPRAMIDE HYDROCHLORIDE 10 MG: 5 SOLUTION ORAL at 07:59

## 2022-06-23 RX ADMIN — PIPERACILLIN SODIUM AND TAZOBACTAM SODIUM 4.5 G: 4; .5 INJECTION, POWDER, LYOPHILIZED, FOR SOLUTION INTRAVENOUS at 11:35

## 2022-06-23 RX ADMIN — IPRATROPIUM BROMIDE AND ALBUTEROL SULFATE 3 ML: .5; 3 SOLUTION RESPIRATORY (INHALATION) at 11:48

## 2022-06-23 RX ADMIN — CARBAMAZEPINE 100 MG: 100 SUSPENSION ORAL at 18:28

## 2022-06-23 RX ADMIN — LEVOTHYROXINE SODIUM 150 MCG: 150 TABLET ORAL at 06:39

## 2022-06-23 RX ADMIN — ASPIRIN 81 MG CHEWABLE TABLET 81 MG: 81 TABLET CHEWABLE at 08:00

## 2022-06-23 RX ADMIN — ACETYLCYSTEINE 2 ML: 200 SOLUTION ORAL; RESPIRATORY (INHALATION) at 16:22

## 2022-06-23 RX ADMIN — PIPERACILLIN SODIUM AND TAZOBACTAM SODIUM 4.5 G: 4; .5 INJECTION, POWDER, LYOPHILIZED, FOR SOLUTION INTRAVENOUS at 18:28

## 2022-06-23 RX ADMIN — METOCLOPRAMIDE HYDROCHLORIDE 10 MG: 5 SOLUTION ORAL at 16:41

## 2022-06-23 RX ADMIN — Medication 15 ML: at 16:42

## 2022-06-23 RX ADMIN — SODIUM BICARBONATE 650 MG TABLET 650 MG: at 08:00

## 2022-06-23 RX ADMIN — CALCIUM CARBONATE 1250 MG: 1250 SUSPENSION ORAL at 20:17

## 2022-06-23 RX ADMIN — CARBAMAZEPINE 150 MG: 100 SUSPENSION ORAL at 06:37

## 2022-06-23 RX ADMIN — HYDROCORTISONE 5 MG: 5 TABLET ORAL at 16:41

## 2022-06-23 RX ADMIN — CALCIUM CARBONATE 1250 MG: 1250 SUSPENSION ORAL at 08:04

## 2022-06-23 ASSESSMENT — ACTIVITIES OF DAILY LIVING (ADL)
ADLS_ACUITY_SCORE: 65
ADLS_ACUITY_SCORE: 61
ADLS_ACUITY_SCORE: 65
ADLS_ACUITY_SCORE: 61

## 2022-06-23 NOTE — PROGRESS NOTES
[facility]  Infectious Disease Progress Note          Assessment and Plan:   IMPRESSION:     1.  A 61-year-old male, very well known to us, including recent admission and discharge with strep bacteremia, now admitted with respiratory insufficiency, COVID-19 positive, also possible pneumonia or aspiration as part of diagnosis.  2.  Recent Strep salivarius bacteremia, transesophageal echocardiogram negative, but in the midst of an extended IV course.  3.  History of numerous admissions, frequently as monthly with aspiration, sepsis, often Pseudomonas colonized sputum as cause.  4.  Traumatic brain injury with major chronic neurologic abnormalities.  5.  COVID-19, now being treated.  6.  MRSA (methicillin-resistant Staphylococcus aureus) and VRE (vancomycin-resistant Enterococcus) colonization.     RECOMMENDATIONS:     1.  fevers last 24 hours, now afebrile, ? Covid, resp status has been ok.   2.  Treat COVID-19 as we are doing.  3.  Ongoing discussions with family regarding level of care.  4. Get MRSA PCR from the nares if positive will start vanco        Interval History:   no new complaints looks Ok O2 better T down cxs pending Bc neg              Medications:       acetylcysteine  2 mL Nebulization 4x Daily     aspirin  81 mg Oral or Feeding Tube Daily     baricitinib  4 mg Oral Daily     Brivaracetam  100 mg Oral or Feeding Tube BID     calcium carbonate  1,250 mg Oral or Feeding Tube TID     carBAMazepine  100 mg Oral or Feeding Tube Daily     carBAMazepine  150 mg Oral or Feeding Tube TID     dexamethasone  6 mg Intravenous Q24H     enoxaparin ANTICOAGULANT  40 mg Subcutaneous Q24H     hydrocortisone  15 mg Oral or Feeding Tube Daily     hydrocortisone  5 mg Oral or Feeding Tube Daily at 4 pm     insulin aspart  1-12 Units Subcutaneous Q4H     ipratropium - albuterol 0.5 mg/2.5 mg/3 mL  3 mL Nebulization 4x daily     levothyroxine  150 mcg Per Feeding Tube QAM AC     metoclopramide  10 mg  Per Feeding Tube 4x Daily AC & HS     miconazole   Topical BID     multivitamins w/minerals  15 mL Per J Tube Daily     pantoprazole  40 mg Per J Tube Daily     piperacillin-tazobactam  4.5 g Intravenous Q6H     sodium bicarbonate  650 mg Oral or Feeding Tube BID                  Physical Exam:   Blood pressure 122/70, pulse 70, temperature 98.5  F (36.9  C), temperature source Axillary, resp. rate 17, height 1.829 m (6'), weight 67.5 kg (148 lb 14.4 oz), SpO2 96 %.  Wt Readings from Last 2 Encounters:   06/23/22 67.5 kg (148 lb 14.4 oz)   06/07/22 71 kg (156 lb 8 oz)     Vital Signs with Ranges  Temp:  [97.7  F (36.5  C)-101.1  F (38.4  C)] 98.5  F (36.9  C)  Pulse:  [63-73] 70  Resp:  [17-28] 17  BP: (116-133)/(58-70) 122/70  SpO2:  [90 %-98 %] 96 %    Constitutional: Awake, alert, cooperative, no apparent distress looks like usual self   Lungs: Clear to auscultation bilaterally, no crackles or wheezing   Cardiovascular: Regular rate and rhythm, normal S1 and S2, and no murmur noted   Abdomen: Normal bowel sounds, soft, non-distended, non-tender   Skin: No rashes, no cyanosis, no edema   Other:           Data:   All microbiology laboratory data reviewed.  Recent Labs   Lab Test 06/22/22  0905 06/21/22  1302 06/20/22  0532   WBC 8.4 7.6 8.8   HGB 11.6* 11.1* 10.4*   HCT 36.1* 34.7* 33.4*   MCV 92 93 98   * 118* 500*     Recent Labs   Lab Test 06/22/22  0905 06/21/22  1302 06/20/22  0532   CR 0.96 0.74 0.73     No lab results found.  Recent Labs   Lab Test 05/23/21  0316 05/23/21  0250 04/15/21  2250 02/22/21  1622 02/22/21  1439 02/22/21  1413 02/19/21  1155 02/19/21  1123 01/15/21  0214   CULT No growth No growth No growth No growth No growth No growth No growth No growth >100,000 colonies/mL  Klebsiella pneumoniae  *

## 2022-06-23 NOTE — PROGRESS NOTES
[facility] Nurse Inpatient Assessment     Consulted for: Coccyx     Summary: Pt has long history of intermittent skin breakdown and rash to coccyx and groin area.  Coccyx/ upper buttock tissue was fragile with diffuse blanchable erythema on admission, now right inner buttock has evolved to a small non-blanching maroon area, tissue intact and firm.  Unclear if this is a DTPI (deep tissue pressure injury) that could have been present yet still in the early evolution on admission, vs a new injury, vs other unclear etiology.  Low air loss mattress ordered today 6/23.  Groin rash improving.  Pt frequently incontinent of urine and stool.      Patient History (according to provider note(s):      Keyon Farias is a 61 year old male admitted on 6/16/2022. He presents to the emergency department with fever.  History of frequent aspiration pneumonia and aspiration pneumonitis events as well as recent strep bacteremia thought secondary to oral jaime resulting in septic shock and prolonged hospitalization with discharge 6/8/2022.  Found now to have a new diagnosis of COVID-19, though fortunately no hypoxia or other associated symptoms.    Areas Assessed:      Areas visualized during today's visit: Sacrum/coccyx and groin    Wound Location: Right inner buttock    Last photo: 6-23-22 6-17-22      Wound due to: pressure vs other  Wound history/plan of care: pt has long hx of occasional breakdown and rash, usually related to IAD (incontinence-associated dermatitis); pt total cares and needs assist x 2 for repositioning.    Wound base: intact red/maroon non-blanching tissue along inner upper right buttock, darker toward distal aspect     Palpation of the wound bed: slightly firm     Drainage: none     Description of drainage: none     Measurements (length x width x depth, in cm): roughly 5 x 2 x 0cm area       Tunneling: N/A     Undermining: N/A  Periwound skin: Intact and Erythema- blanchable       Color: pink      Temperature: normal   Odor: none  Pain: no grimacing or signs of discomfort    Pain interventions prior to dressing change: patient tolerated well  Treatment goal: Heal , Decrease moisture and Protection  STATUS: evolving  Supplies ordered: ordered Pulsate mattress        Skin Injury Location: Groin    Last photo: 6-17-22 (no new pic 6/23, appears less red)      Skin injury due to: Fungal rash , Incontinence associated dermatitis (IAD) and possible follicle irritation/ pustules to right upper groin now mostly resolved  Skin history and plan of care: incontinent of urine and stool, has primofit in use with varying success per nursing  Affected area: bilateral inner groin     Skin assessment: Intact, Erythema and Rash     Measurements (length x width x depth, in cm) scattered erythema      Color: pink     Temperature  normal   Pain: no grimacing or signs of discomfort    Treatment goal: Decrease moisture, Maintain (prevention of deterioration) and Protection  STATUS: improving  Supplies reviewed      Treatment Plan:     Perineal cares: BID and prn:  1.  Cleanse with Rita perineal lotion and soft dry wipes.  (Please use this combo instead of the blue bag wipes).   2.  Apply antifungal powder to groin folds and gluteal cleft, rub in well.  3.  Apply thin glaze of barrier paste to perianal area, gluteal cleft, inner buttocks prn if loose stools.  4.  Leave coccyx open to air if frequently incontinent.  Minimize use of briefs, if able to contain urine with other devices.   5.  PIP measures; Pulsate mattress.  Only use lift sheet and chux on air mattress - no draw sheet needed.     Pressure Injury Prevention (PIP) Plan:      Moisture management: Perineal cleansing /protection: Follow Incontinence Protocol and Clean and dry skin folds with bathing       Mattress: low air loss recommended      HOB: Maintain at or below 30 degrees, unless contraindicated      Repositioning in bed: Every 1-2 hours ,  Left/right positioning; avoid supine and Raise foot of bed prior to raising head of bed, to reduce patient sliding down (shear)      Heels: Keep elevated off mattress and Pillows under calves      Protective dressing: None d/t frequent incontinence      Under devices: Inspect skin under all medical devices during skin inspection , Ensure tubes are stabilized without tension and Ensure patient is not lying on medical devices or equipment when repositioned    Orders: Reviewed and Updated    RECOMMEND PRIMARY TEAM ORDER: None, at this time  Education provided: importance of repositioning, plan of care, Moisture management, Hygiene and Off-loading pressure  Discussed plan of care with: Patient and Nurse  WOC nurse follow-up plan: weekly and prn  Notify WOC if wound(s) deteriorate.  Nursing to notify the Provider(s) and re-consult the WOC Nurse if new skin concern.    DATA:     Current support surface: cart in ED  Containment of urine/stool: Incontinence Protocol; RN just applied PrimoFit to see if will work for pt  BMI: Body mass index is 20.19 kg/m .   Active diet order: Orders Placed This Encounter      NPO for Medical/Clinical Reasons Except for: No Exceptions     Output: I/O last 3 completed shifts:  In: 2161 [I.V.:453; NG/GT:1708]  Out: 1900 [Urine:1900]     Labs:   Recent Labs   Lab 06/23/22  1422 06/21/22  1302 06/20/22  0532 06/18/22  0327 06/16/22  2248   ALBUMIN 2.7*   < > 2.6*   < >  --    HGB 11.2*   < > 10.4*   < >  --    INR  --   --   --   --  1.22*   WBC 7.8   < > 8.8   < >  --    CRP  --   --  87.5*   < >  --     < > = values in this interval not displayed.     Pressure injury risk assessment:   Sensory Perception: 3-->slightly limited  Moisture: 3-->occasionally moist  Activity: 2-->chairfast  Mobility: 2-->very limited  Nutrition: 3-->adequate  Friction and Shear: 3-->no apparent problem  Ricci Score: 16    Kristie Norris RN   Dept. Pager: [phone]  Dept. Office Number: [phone]
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
## 2022-06-24 LAB
ALBUMIN SERPL-MCNC: 2.8 G/DL (ref 3.4–5)
ALP SERPL-CCNC: 91 U/L (ref 40–150)
ALT SERPL W P-5'-P-CCNC: 49 U/L (ref 0–70)
ANION GAP SERPL CALCULATED.3IONS-SCNC: 3 MMOL/L (ref 3–14)
AST SERPL W P-5'-P-CCNC: 37 U/L (ref 0–45)
BILIRUB SERPL-MCNC: 0.4 MG/DL (ref 0.2–1.3)
BUN SERPL-MCNC: 18 MG/DL (ref 7–30)
CALCIUM SERPL-MCNC: 9 MG/DL (ref 8.5–10.1)
CHLORIDE BLD-SCNC: 103 MMOL/L (ref 94–109)
CO2 SERPL-SCNC: 28 MMOL/L (ref 20–32)
CREAT SERPL-MCNC: 0.83 MG/DL (ref 0.66–1.25)
ERYTHROCYTE [DISTWIDTH] IN BLOOD BY AUTOMATED COUNT: 14.1 % (ref 10–15)
FOLATE SERPL-MCNC: 12.2 NG/ML (ref 4.6–34.8)
GFR SERPL CREATININE-BSD FRML MDRD: >90 ML/MIN/1.73M2
GLUCOSE BLD-MCNC: 140 MG/DL (ref 70–99)
GLUCOSE BLDC GLUCOMTR-MCNC: 105 MG/DL (ref 70–99)
GLUCOSE BLDC GLUCOMTR-MCNC: 128 MG/DL (ref 70–99)
GLUCOSE BLDC GLUCOMTR-MCNC: 157 MG/DL (ref 70–99)
GLUCOSE BLDC GLUCOMTR-MCNC: 218 MG/DL (ref 70–99)
GLUCOSE BLDC GLUCOMTR-MCNC: 223 MG/DL (ref 70–99)
GLUCOSE BLDC GLUCOMTR-MCNC: 260 MG/DL (ref 70–99)
GLUCOSE BLDC GLUCOMTR-MCNC: 311 MG/DL (ref 70–99)
HCT VFR BLD AUTO: 38.2 % (ref 40–53)
HGB BLD-MCNC: 12.4 G/DL (ref 13.3–17.7)
MCH RBC QN AUTO: 29.3 PG (ref 26.5–33)
MCHC RBC AUTO-ENTMCNC: 32.5 G/DL (ref 31.5–36.5)
MCV RBC AUTO: 90 FL (ref 78–100)
PLATELET # BLD AUTO: 137 10E3/UL (ref 150–450)
POTASSIUM BLD-SCNC: 3.9 MMOL/L (ref 3.4–5.3)
PROT SERPL-MCNC: 7.6 G/DL (ref 6.8–8.8)
RBC # BLD AUTO: 4.23 10E6/UL (ref 4.4–5.9)
SODIUM SERPL-SCNC: 134 MMOL/L (ref 133–144)
WBC # BLD AUTO: 8.5 10E3/UL (ref 4–11)

## 2022-06-24 PROCEDURE — 250N000011 HC RX IP 250 OP 636: Performed by: HOSPITALIST

## 2022-06-24 PROCEDURE — 99232 SBSQ HOSP IP/OBS MODERATE 35: CPT | Performed by: HOSPITALIST

## 2022-06-24 PROCEDURE — 82746 ASSAY OF FOLIC ACID SERUM: CPT | Performed by: HOSPITALIST

## 2022-06-24 PROCEDURE — 80053 COMPREHEN METABOLIC PANEL: CPT | Performed by: HOSPITALIST

## 2022-06-24 PROCEDURE — 36415 COLL VENOUS BLD VENIPUNCTURE: CPT | Performed by: HOSPITALIST

## 2022-06-24 PROCEDURE — 250N000013 HC RX MED GY IP 250 OP 250 PS 637: Performed by: HOSPITALIST

## 2022-06-24 PROCEDURE — 82040 ASSAY OF SERUM ALBUMIN: CPT | Performed by: HOSPITALIST

## 2022-06-24 PROCEDURE — 250N000009 HC RX 250: Performed by: HOSPITALIST

## 2022-06-24 PROCEDURE — 94640 AIRWAY INHALATION TREATMENT: CPT | Mod: 76

## 2022-06-24 PROCEDURE — 85027 COMPLETE CBC AUTOMATED: CPT | Performed by: HOSPITALIST

## 2022-06-24 PROCEDURE — 94640 AIRWAY INHALATION TREATMENT: CPT

## 2022-06-24 PROCEDURE — 999N000157 HC STATISTIC RCP TIME EA 10 MIN

## 2022-06-24 PROCEDURE — 120N000001 HC R&B MED SURG/OB

## 2022-06-24 PROCEDURE — 258N000003 HC RX IP 258 OP 636: Performed by: HOSPITALIST

## 2022-06-24 RX ADMIN — IPRATROPIUM BROMIDE AND ALBUTEROL SULFATE 3 ML: .5; 3 SOLUTION RESPIRATORY (INHALATION) at 19:52

## 2022-06-24 RX ADMIN — BRIVARACETAM 100 MG: 10 SOLUTION ORAL at 21:02

## 2022-06-24 RX ADMIN — MICONAZOLE NITRATE: 20 POWDER TOPICAL at 08:38

## 2022-06-24 RX ADMIN — Medication 40 MG: at 08:34

## 2022-06-24 RX ADMIN — PIPERACILLIN SODIUM AND TAZOBACTAM SODIUM 4.5 G: 4; .5 INJECTION, POWDER, LYOPHILIZED, FOR SOLUTION INTRAVENOUS at 13:27

## 2022-06-24 RX ADMIN — CALCIUM CARBONATE 1250 MG: 1250 SUSPENSION ORAL at 16:01

## 2022-06-24 RX ADMIN — IPRATROPIUM BROMIDE AND ALBUTEROL SULFATE 3 ML: .5; 3 SOLUTION RESPIRATORY (INHALATION) at 11:54

## 2022-06-24 RX ADMIN — CALCIUM CARBONATE 1250 MG: 1250 SUSPENSION ORAL at 21:02

## 2022-06-24 RX ADMIN — LEVOTHYROXINE SODIUM 150 MCG: 150 TABLET ORAL at 06:14

## 2022-06-24 RX ADMIN — HYDROCORTISONE 15 MG: 10 TABLET ORAL at 08:34

## 2022-06-24 RX ADMIN — CARBAMAZEPINE 150 MG: 100 SUSPENSION ORAL at 13:38

## 2022-06-24 RX ADMIN — METOCLOPRAMIDE HYDROCHLORIDE 10 MG: 5 SOLUTION ORAL at 15:47

## 2022-06-24 RX ADMIN — BARICITINIB 4 MG: 2 TABLET, FILM COATED ORAL at 15:47

## 2022-06-24 RX ADMIN — VANCOMYCIN HYDROCHLORIDE 1500 MG: 5 INJECTION, POWDER, LYOPHILIZED, FOR SOLUTION INTRAVENOUS at 11:20

## 2022-06-24 RX ADMIN — ACETYLCYSTEINE 2 ML: 200 SOLUTION ORAL; RESPIRATORY (INHALATION) at 15:22

## 2022-06-24 RX ADMIN — IPRATROPIUM BROMIDE AND ALBUTEROL SULFATE 3 ML: .5; 3 SOLUTION RESPIRATORY (INHALATION) at 07:05

## 2022-06-24 RX ADMIN — CARBAMAZEPINE 150 MG: 100 SUSPENSION ORAL at 00:08

## 2022-06-24 RX ADMIN — BRIVARACETAM 100 MG: 10 SOLUTION ORAL at 08:51

## 2022-06-24 RX ADMIN — PIPERACILLIN SODIUM AND TAZOBACTAM SODIUM 4.5 G: 4; .5 INJECTION, POWDER, LYOPHILIZED, FOR SOLUTION INTRAVENOUS at 20:58

## 2022-06-24 RX ADMIN — SODIUM BICARBONATE 650 MG TABLET 650 MG: at 08:34

## 2022-06-24 RX ADMIN — IPRATROPIUM BROMIDE AND ALBUTEROL SULFATE 3 ML: .5; 3 SOLUTION RESPIRATORY (INHALATION) at 15:23

## 2022-06-24 RX ADMIN — METOCLOPRAMIDE HYDROCHLORIDE 10 MG: 5 SOLUTION ORAL at 08:34

## 2022-06-24 RX ADMIN — ENOXAPARIN SODIUM 40 MG: 40 INJECTION SUBCUTANEOUS at 13:26

## 2022-06-24 RX ADMIN — MICONAZOLE NITRATE: 20 POWDER TOPICAL at 21:10

## 2022-06-24 RX ADMIN — METOCLOPRAMIDE HYDROCHLORIDE 10 MG: 5 SOLUTION ORAL at 13:26

## 2022-06-24 RX ADMIN — ACETYLCYSTEINE 2 ML: 200 SOLUTION ORAL; RESPIRATORY (INHALATION) at 19:52

## 2022-06-24 RX ADMIN — CARBAMAZEPINE 100 MG: 100 SUSPENSION ORAL at 18:05

## 2022-06-24 RX ADMIN — CALCIUM CARBONATE 1250 MG: 1250 SUSPENSION ORAL at 08:34

## 2022-06-24 RX ADMIN — PIPERACILLIN SODIUM AND TAZOBACTAM SODIUM 4.5 G: 4; .5 INJECTION, POWDER, LYOPHILIZED, FOR SOLUTION INTRAVENOUS at 00:03

## 2022-06-24 RX ADMIN — SODIUM BICARBONATE 650 MG TABLET 650 MG: at 21:04

## 2022-06-24 RX ADMIN — ASPIRIN 81 MG CHEWABLE TABLET 81 MG: 81 TABLET CHEWABLE at 08:35

## 2022-06-24 RX ADMIN — METOCLOPRAMIDE HYDROCHLORIDE 10 MG: 5 SOLUTION ORAL at 21:04

## 2022-06-24 RX ADMIN — CARBAMAZEPINE 150 MG: 100 SUSPENSION ORAL at 06:15

## 2022-06-24 RX ADMIN — Medication 15 ML: at 15:47

## 2022-06-24 RX ADMIN — HYDROCORTISONE 5 MG: 5 TABLET ORAL at 15:47

## 2022-06-24 RX ADMIN — ACETYLCYSTEINE 2 ML: 200 SOLUTION ORAL; RESPIRATORY (INHALATION) at 07:05

## 2022-06-24 RX ADMIN — DEXAMETHASONE SODIUM PHOSPHATE 6 MG: 4 INJECTION, SOLUTION INTRAMUSCULAR; INTRAVENOUS at 13:26

## 2022-06-24 RX ADMIN — ACETYLCYSTEINE 2 ML: 200 SOLUTION ORAL; RESPIRATORY (INHALATION) at 11:54

## 2022-06-24 RX ADMIN — PIPERACILLIN SODIUM AND TAZOBACTAM SODIUM 4.5 G: 4; .5 INJECTION, POWDER, LYOPHILIZED, FOR SOLUTION INTRAVENOUS at 06:14

## 2022-06-24 ASSESSMENT — ACTIVITIES OF DAILY LIVING (ADL)
ADLS_ACUITY_SCORE: 65
ADLS_ACUITY_SCORE: 67
ADLS_ACUITY_SCORE: 65
ADLS_ACUITY_SCORE: 65
ADLS_ACUITY_SCORE: 61
ADLS_ACUITY_SCORE: 65
ADLS_ACUITY_SCORE: 61
ADLS_ACUITY_SCORE: 65
ADLS_ACUITY_SCORE: 65
ADLS_ACUITY_SCORE: 61
ADLS_ACUITY_SCORE: 61
ADLS_ACUITY_SCORE: 65
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
## 2022-06-24 NOTE — PROGRESS NOTES
[facility]  Infectious Disease Progress Note          Assessment and Plan:   IMPRESSION:     1.  A 61-year-old male, very well known to us, including recent admission and discharge with strep bacteremia, now admitted with respiratory insufficiency, COVID-19 positive, also possible pneumonia or aspiration as part of diagnosis.  2.  Recent Strep salivarius bacteremia, transesophageal echocardiogram negative, but in the midst of an extended IV course.  3.  History of numerous admissions, frequently as monthly with aspiration, sepsis, often Pseudomonas colonized sputum as cause.  4.  Traumatic brain injury with major chronic neurologic abnormalities.  5.  COVID-19, now being treated.  6.  MRSA (methicillin-resistant Staphylococcus aureus) and VRE (vancomycin-resistant Enterococcus) colonization.     RECOMMENDATIONS:     1.  fevers last 24 hours, now afebrile, ? Covid, resp status has been ok.   2.  Treat COVID-19 as we are doing.  3.  Ongoing discussions with family regarding level of care.  4.  Continue on zosyn + vanco ( 6 days)         Interval History:   no new complaints looks Ok O2 better T down cxs pending Bc neg              Medications:       acetylcysteine  2 mL Nebulization 4x Daily     aspirin  81 mg Oral or Feeding Tube Daily     baricitinib  4 mg Oral Daily     Brivaracetam  100 mg Oral or Feeding Tube BID     calcium carbonate  1,250 mg Oral or Feeding Tube TID     carBAMazepine  100 mg Oral or Feeding Tube Daily     carBAMazepine  150 mg Oral or Feeding Tube TID     dexamethasone  6 mg Intravenous Q24H     enoxaparin ANTICOAGULANT  40 mg Subcutaneous Q24H     hydrocortisone  15 mg Oral or Feeding Tube Daily     hydrocortisone  5 mg Oral or Feeding Tube Daily at 4 pm     insulin aspart  1-12 Units Subcutaneous Q4H     ipratropium - albuterol 0.5 mg/2.5 mg/3 mL  3 mL Nebulization 4x daily     levothyroxine  150 mcg Per Feeding Tube QAM AC     metoclopramide  10 mg Per Feeding Tube  4x Daily AC & HS     miconazole   Topical BID     multivitamins w/minerals  15 mL Per J Tube Daily     pantoprazole  40 mg Per J Tube Daily     piperacillin-tazobactam  4.5 g Intravenous Q6H     sodium bicarbonate  650 mg Oral or Feeding Tube BID     vancomycin  1,500 mg Intravenous Q18H                  Physical Exam:   Blood pressure 132/66, pulse 64, temperature 97.8  F (36.6  C), temperature source Axillary, resp. rate 18, height 1.829 m (6'), weight 67.5 kg (148 lb 14.4 oz), SpO2 100 %.  Wt Readings from Last 2 Encounters:   06/23/22 67.5 kg (148 lb 14.4 oz)   06/07/22 71 kg (156 lb 8 oz)     Vital Signs with Ranges  Temp:  [97.4  F (36.3  C)-98.6  F (37  C)] 97.8  F (36.6  C)  Pulse:  [62-64] 64  Resp:  [18] 18  BP: (108-135)/(55-66) 132/66  SpO2:  [95 %-100 %] 100 %    Constitutional: Awake, alert, cooperative, no apparent distress looks like usual self   Lungs: Clear to auscultation bilaterally, no crackles or wheezing   Cardiovascular: Regular rate and rhythm, normal S1 and S2, and no murmur noted   Abdomen: Normal bowel sounds, soft, non-distended, non-tender   Skin: No rashes, no cyanosis, no edema   Other:           Data:   All microbiology laboratory data reviewed.  Recent Labs   Lab Test 06/24/22  0916 06/23/22  1422 06/22/22  0905   WBC 8.5 7.8 8.4   HGB 12.4* 11.2* 11.6*   HCT 38.2* 34.9* 36.1*   MCV 90 92 92   * 111* 115*     Recent Labs   Lab Test 06/24/22  0916 06/23/22  1422 06/22/22  0905   CR 0.83 0.83 0.96     No lab results found.  Recent Labs   Lab Test 05/23/21  0316 05/23/21  0250 04/15/21  2250 02/22/21  1622 02/22/21  1439 02/22/21  1413 02/19/21  1155 02/19/21  1123 01/15/21  0214   CULT No growth No growth No growth No growth No growth No growth No growth No growth >100,000 colonies/mL  Klebsiella pneumoniae  *

## 2022-06-24 NOTE — PHARMACY-VANCOMYCIN DOSING SERVICE
"Pharmacy Vancomycin Initial Note  Date of Service 2022  Patient's  1960  61 year old, male    Indication: [facility]-Associated Pneumonia    Current estimated CrCl = Estimated Creatinine Clearance: 91.5 mL/min (based on SCr of 0.83 mg/dL).    Creatinine for last 3 days  2022:  1:02 PM Creatinine 0.74 mg/dL  2022:  9:05 AM Creatinine 0.96 mg/dL  2022:  2:22 PM Creatinine 0.83 mg/dL    Recent Vancomycin Level(s) for last 3 days  No results found for requested labs within last 72 hours.      Vancomycin IV Administrations (past 72 hours)      No vancomycin orders with administrations in past 72 hours.                Nephrotoxins and other renal medications (From now, onward)    Start     Dose/Rate Route Frequency Ordered Stop    22 0930  vancomycin 1500 mg in 0.9% NaCl 250 ml intermittent infusion 1,500 mg         1,500 mg  over 90 Minutes Intravenous EVERY 18 HOURS 22 0902      22 1137  ibuprofen (ADVIL/MOTRIN) suspension 400 mg         400 mg Oral EVERY 6 HOURS PRN 22 1137      22 1330  piperacillin-tazobactam (ZOSYN) 4.5 g vial to attach to  mL bag        Note to Pharmacy: For [facility], [facility] and [facility]: For Zosyn-naive patients, use the \"Zosyn initial dose + extended infusion\" order panel.    4.5 g  over 30 Minutes Intravenous EVERY 6 HOURS 22 1306            Contrast Orders - past 72 hours (72h ago, onward)    None          [employer] Prediction of Planned Initial Vancomycin Regimen  Loading dose: N/A  Regimen: 1500 mg IV every 18 hours.  Start time: 10:00 on 2022  Exposure target: AUC24 (range)400-600 mg/L.hr   AUC24,ss: 581 mg/L.hr  Probability of AUC24 > 400: 99 %  Ctrough,ss: 16.9 mg/L  Probability of Ctrough,ss > 20: 16 %  Probability of nephrotoxicity (Lodise ERNESTO ): 13 %          Plan:  1. Start vancomycin  1500 mg IV q18h. Restarted previous regimen from  as patient's AUC was within goal with this dosing.  2. Vancomycin " monitoring method: AUC  3. Vancomycin therapeutic monitoring goal: 400-600 mg*h/L  4. Pharmacy will check vancomycin levels as appropriate in 1-3 Days.    5. Serum creatinine levels will be ordered daily for the first week of therapy and at least twice weekly for subsequent weeks.      Myah Whittaker

## 2022-06-25 LAB
ALBUMIN SERPL-MCNC: 2.5 G/DL (ref 3.4–5)
ALP SERPL-CCNC: 85 U/L (ref 40–150)
ALT SERPL W P-5'-P-CCNC: 55 U/L (ref 0–70)
ANION GAP SERPL CALCULATED.3IONS-SCNC: 4 MMOL/L (ref 3–14)
AST SERPL W P-5'-P-CCNC: 45 U/L (ref 0–45)
BACTERIA SPEC CULT: NORMAL
BILIRUB SERPL-MCNC: 0.8 MG/DL (ref 0.2–1.3)
BUN SERPL-MCNC: 20 MG/DL (ref 7–30)
CALCIUM SERPL-MCNC: 8.4 MG/DL (ref 8.5–10.1)
CHLORIDE BLD-SCNC: 102 MMOL/L (ref 94–109)
CO2 SERPL-SCNC: 30 MMOL/L (ref 20–32)
CREAT SERPL-MCNC: 0.78 MG/DL (ref 0.66–1.25)
ERYTHROCYTE [DISTWIDTH] IN BLOOD BY AUTOMATED COUNT: 14.1 % (ref 10–15)
GFR SERPL CREATININE-BSD FRML MDRD: >90 ML/MIN/1.73M2
GLUCOSE BLD-MCNC: 160 MG/DL (ref 70–99)
GLUCOSE BLDC GLUCOMTR-MCNC: 115 MG/DL (ref 70–99)
GLUCOSE BLDC GLUCOMTR-MCNC: 137 MG/DL (ref 70–99)
GLUCOSE BLDC GLUCOMTR-MCNC: 187 MG/DL (ref 70–99)
GLUCOSE BLDC GLUCOMTR-MCNC: 193 MG/DL (ref 70–99)
GLUCOSE BLDC GLUCOMTR-MCNC: 219 MG/DL (ref 70–99)
GLUCOSE BLDC GLUCOMTR-MCNC: 258 MG/DL (ref 70–99)
GLUCOSE BLDC GLUCOMTR-MCNC: 311 MG/DL (ref 70–99)
HCT VFR BLD AUTO: 35.6 % (ref 40–53)
HGB BLD-MCNC: 11.3 G/DL (ref 13.3–17.7)
MCH RBC QN AUTO: 29.3 PG (ref 26.5–33)
MCHC RBC AUTO-ENTMCNC: 31.7 G/DL (ref 31.5–36.5)
MCV RBC AUTO: 92 FL (ref 78–100)
PLATELET # BLD AUTO: 163 10E3/UL (ref 150–450)
POTASSIUM BLD-SCNC: 3.8 MMOL/L (ref 3.4–5.3)
PROT SERPL-MCNC: 7.1 G/DL (ref 6.8–8.8)
RBC # BLD AUTO: 3.86 10E6/UL (ref 4.4–5.9)
SODIUM SERPL-SCNC: 136 MMOL/L (ref 133–144)
VANCOMYCIN SERPL-MCNC: 18.2 MG/L
WBC # BLD AUTO: 6.7 10E3/UL (ref 4–11)

## 2022-06-25 PROCEDURE — 250N000013 HC RX MED GY IP 250 OP 250 PS 637: Performed by: HOSPITALIST

## 2022-06-25 PROCEDURE — 999N000040 HC STATISTIC CONSULT NO CHARGE VASC ACCESS

## 2022-06-25 PROCEDURE — 80053 COMPREHEN METABOLIC PANEL: CPT | Performed by: HOSPITALIST

## 2022-06-25 PROCEDURE — 36415 COLL VENOUS BLD VENIPUNCTURE: CPT | Performed by: HOSPITALIST

## 2022-06-25 PROCEDURE — 94640 AIRWAY INHALATION TREATMENT: CPT

## 2022-06-25 PROCEDURE — 250N000009 HC RX 250: Performed by: HOSPITALIST

## 2022-06-25 PROCEDURE — 85027 COMPLETE CBC AUTOMATED: CPT | Performed by: HOSPITALIST

## 2022-06-25 PROCEDURE — 94640 AIRWAY INHALATION TREATMENT: CPT | Mod: 76

## 2022-06-25 PROCEDURE — 80202 ASSAY OF VANCOMYCIN: CPT

## 2022-06-25 PROCEDURE — 999N000190 HC STATISTIC VAT ROUNDS

## 2022-06-25 PROCEDURE — 250N000011 HC RX IP 250 OP 636: Performed by: HOSPITALIST

## 2022-06-25 PROCEDURE — 258N000003 HC RX IP 258 OP 636: Performed by: HOSPITALIST

## 2022-06-25 PROCEDURE — 99232 SBSQ HOSP IP/OBS MODERATE 35: CPT | Performed by: HOSPITALIST

## 2022-06-25 PROCEDURE — 36415 COLL VENOUS BLD VENIPUNCTURE: CPT

## 2022-06-25 PROCEDURE — 120N000001 HC R&B MED SURG/OB

## 2022-06-25 PROCEDURE — 999N000157 HC STATISTIC RCP TIME EA 10 MIN

## 2022-06-25 RX ADMIN — PIPERACILLIN SODIUM AND TAZOBACTAM SODIUM 4.5 G: 4; .5 INJECTION, POWDER, LYOPHILIZED, FOR SOLUTION INTRAVENOUS at 20:19

## 2022-06-25 RX ADMIN — Medication 40 MG: at 08:39

## 2022-06-25 RX ADMIN — BRIVARACETAM 100 MG: 10 SOLUTION ORAL at 20:22

## 2022-06-25 RX ADMIN — CALCIUM CARBONATE 1250 MG: 1250 SUSPENSION ORAL at 08:39

## 2022-06-25 RX ADMIN — HYDROCORTISONE 5 MG: 5 TABLET ORAL at 16:48

## 2022-06-25 RX ADMIN — HYDROCORTISONE 15 MG: 10 TABLET ORAL at 08:33

## 2022-06-25 RX ADMIN — Medication 15 ML: at 16:48

## 2022-06-25 RX ADMIN — CARBAMAZEPINE 150 MG: 100 SUSPENSION ORAL at 13:18

## 2022-06-25 RX ADMIN — CARBAMAZEPINE 150 MG: 100 SUSPENSION ORAL at 05:58

## 2022-06-25 RX ADMIN — MICONAZOLE NITRATE: 20 POWDER TOPICAL at 08:39

## 2022-06-25 RX ADMIN — CARBAMAZEPINE 100 MG: 100 SUSPENSION ORAL at 17:00

## 2022-06-25 RX ADMIN — VANCOMYCIN HYDROCHLORIDE 1500 MG: 5 INJECTION, POWDER, LYOPHILIZED, FOR SOLUTION INTRAVENOUS at 04:30

## 2022-06-25 RX ADMIN — DEXAMETHASONE SODIUM PHOSPHATE 6 MG: 4 INJECTION, SOLUTION INTRAMUSCULAR; INTRAVENOUS at 13:19

## 2022-06-25 RX ADMIN — ACETYLCYSTEINE 2 ML: 200 SOLUTION ORAL; RESPIRATORY (INHALATION) at 19:43

## 2022-06-25 RX ADMIN — ASPIRIN 81 MG CHEWABLE TABLET 81 MG: 81 TABLET CHEWABLE at 08:33

## 2022-06-25 RX ADMIN — IPRATROPIUM BROMIDE AND ALBUTEROL SULFATE 3 ML: .5; 3 SOLUTION RESPIRATORY (INHALATION) at 15:56

## 2022-06-25 RX ADMIN — SODIUM BICARBONATE 650 MG TABLET 650 MG: at 20:24

## 2022-06-25 RX ADMIN — ACETYLCYSTEINE 2 ML: 200 SOLUTION ORAL; RESPIRATORY (INHALATION) at 11:48

## 2022-06-25 RX ADMIN — CARBAMAZEPINE 150 MG: 100 SUSPENSION ORAL at 00:26

## 2022-06-25 RX ADMIN — CALCIUM CARBONATE 1250 MG: 1250 SUSPENSION ORAL at 20:22

## 2022-06-25 RX ADMIN — VANCOMYCIN HYDROCHLORIDE 1500 MG: 5 INJECTION, POWDER, LYOPHILIZED, FOR SOLUTION INTRAVENOUS at 23:11

## 2022-06-25 RX ADMIN — PIPERACILLIN SODIUM AND TAZOBACTAM SODIUM 4.5 G: 4; .5 INJECTION, POWDER, LYOPHILIZED, FOR SOLUTION INTRAVENOUS at 08:31

## 2022-06-25 RX ADMIN — PIPERACILLIN SODIUM AND TAZOBACTAM SODIUM 4.5 G: 4; .5 INJECTION, POWDER, LYOPHILIZED, FOR SOLUTION INTRAVENOUS at 02:15

## 2022-06-25 RX ADMIN — CALCIUM CARBONATE 1250 MG: 1250 SUSPENSION ORAL at 16:48

## 2022-06-25 RX ADMIN — BARICITINIB 4 MG: 2 TABLET, FILM COATED ORAL at 16:49

## 2022-06-25 RX ADMIN — METOCLOPRAMIDE HYDROCHLORIDE 10 MG: 5 SOLUTION ORAL at 08:32

## 2022-06-25 RX ADMIN — SODIUM BICARBONATE 650 MG TABLET 650 MG: at 08:33

## 2022-06-25 RX ADMIN — ENOXAPARIN SODIUM 40 MG: 40 INJECTION SUBCUTANEOUS at 11:18

## 2022-06-25 RX ADMIN — MICONAZOLE NITRATE: 20 POWDER TOPICAL at 20:24

## 2022-06-25 RX ADMIN — PIPERACILLIN SODIUM AND TAZOBACTAM SODIUM 4.5 G: 4; .5 INJECTION, POWDER, LYOPHILIZED, FOR SOLUTION INTRAVENOUS at 13:18

## 2022-06-25 RX ADMIN — BRIVARACETAM 100 MG: 10 SOLUTION ORAL at 08:39

## 2022-06-25 RX ADMIN — IPRATROPIUM BROMIDE AND ALBUTEROL SULFATE 3 ML: .5; 3 SOLUTION RESPIRATORY (INHALATION) at 11:48

## 2022-06-25 RX ADMIN — METOCLOPRAMIDE HYDROCHLORIDE 10 MG: 5 SOLUTION ORAL at 11:18

## 2022-06-25 RX ADMIN — LEVOTHYROXINE SODIUM 150 MCG: 150 TABLET ORAL at 05:58

## 2022-06-25 RX ADMIN — IPRATROPIUM BROMIDE AND ALBUTEROL SULFATE 3 ML: .5; 3 SOLUTION RESPIRATORY (INHALATION) at 19:44

## 2022-06-25 RX ADMIN — METOCLOPRAMIDE HYDROCHLORIDE 10 MG: 5 SOLUTION ORAL at 16:48

## 2022-06-25 RX ADMIN — METOCLOPRAMIDE HYDROCHLORIDE 10 MG: 5 SOLUTION ORAL at 20:24

## 2022-06-25 RX ADMIN — ACETYLCYSTEINE 2 ML: 200 SOLUTION ORAL; RESPIRATORY (INHALATION) at 15:56

## 2022-06-25 RX ADMIN — IPRATROPIUM BROMIDE AND ALBUTEROL SULFATE 3 ML: .5; 3 SOLUTION RESPIRATORY (INHALATION) at 08:45

## 2022-06-25 RX ADMIN — ACETYLCYSTEINE 2 ML: 200 SOLUTION ORAL; RESPIRATORY (INHALATION) at 08:45

## 2022-06-25 RX ADMIN — SODIUM CHLORIDE: 9 INJECTION, SOLUTION INTRAVENOUS at 08:33

## 2022-06-25 ASSESSMENT — ACTIVITIES OF DAILY LIVING (ADL)
ADLS_ACUITY_SCORE: 67
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
## 2022-06-25 NOTE — PROGRESS NOTES
Pt is on room air with 02 Sats in mid 90's . Lung sounds coarse/ diminished at the lung bases. Neb tx given as schedule. Will continue to follow.  6/25/2022  Steven Padilla, RT

## 2022-06-26 LAB
ALBUMIN SERPL-MCNC: 2.7 G/DL (ref 3.4–5)
ALP SERPL-CCNC: 112 U/L (ref 40–150)
ALT SERPL W P-5'-P-CCNC: 75 U/L (ref 0–70)
ANION GAP SERPL CALCULATED.3IONS-SCNC: 6 MMOL/L (ref 3–14)
AST SERPL W P-5'-P-CCNC: 78 U/L (ref 0–45)
BILIRUB SERPL-MCNC: 0.5 MG/DL (ref 0.2–1.3)
BUN SERPL-MCNC: 19 MG/DL (ref 7–30)
CALCIUM SERPL-MCNC: 8.8 MG/DL (ref 8.5–10.1)
CHLORIDE BLD-SCNC: 104 MMOL/L (ref 94–109)
CO2 SERPL-SCNC: 28 MMOL/L (ref 20–32)
CREAT SERPL-MCNC: 0.81 MG/DL (ref 0.66–1.25)
ERYTHROCYTE [DISTWIDTH] IN BLOOD BY AUTOMATED COUNT: 14.2 % (ref 10–15)
GFR SERPL CREATININE-BSD FRML MDRD: >90 ML/MIN/1.73M2
GLUCOSE BLD-MCNC: 152 MG/DL (ref 70–99)
GLUCOSE BLDC GLUCOMTR-MCNC: 118 MG/DL (ref 70–99)
GLUCOSE BLDC GLUCOMTR-MCNC: 124 MG/DL (ref 70–99)
GLUCOSE BLDC GLUCOMTR-MCNC: 130 MG/DL (ref 70–99)
GLUCOSE BLDC GLUCOMTR-MCNC: 133 MG/DL (ref 70–99)
GLUCOSE BLDC GLUCOMTR-MCNC: 195 MG/DL (ref 70–99)
GLUCOSE BLDC GLUCOMTR-MCNC: 205 MG/DL (ref 70–99)
HCT VFR BLD AUTO: 38.7 % (ref 40–53)
HGB BLD-MCNC: 12.3 G/DL (ref 13.3–17.7)
MCH RBC QN AUTO: 29.1 PG (ref 26.5–33)
MCHC RBC AUTO-ENTMCNC: 31.8 G/DL (ref 31.5–36.5)
MCV RBC AUTO: 92 FL (ref 78–100)
PLATELET # BLD AUTO: 219 10E3/UL (ref 150–450)
POTASSIUM BLD-SCNC: 3.7 MMOL/L (ref 3.4–5.3)
PROT SERPL-MCNC: 7.5 G/DL (ref 6.8–8.8)
RBC # BLD AUTO: 4.22 10E6/UL (ref 4.4–5.9)
SODIUM SERPL-SCNC: 138 MMOL/L (ref 133–144)
WBC # BLD AUTO: 8.4 10E3/UL (ref 4–11)

## 2022-06-26 PROCEDURE — 999N000157 HC STATISTIC RCP TIME EA 10 MIN

## 2022-06-26 PROCEDURE — 99232 SBSQ HOSP IP/OBS MODERATE 35: CPT | Performed by: HOSPITALIST

## 2022-06-26 PROCEDURE — 250N000013 HC RX MED GY IP 250 OP 250 PS 637: Performed by: HOSPITALIST

## 2022-06-26 PROCEDURE — 94640 AIRWAY INHALATION TREATMENT: CPT | Mod: 76

## 2022-06-26 PROCEDURE — 250N000011 HC RX IP 250 OP 636: Performed by: HOSPITALIST

## 2022-06-26 PROCEDURE — 120N000001 HC R&B MED SURG/OB

## 2022-06-26 PROCEDURE — 250N000009 HC RX 250: Performed by: HOSPITALIST

## 2022-06-26 PROCEDURE — 36415 COLL VENOUS BLD VENIPUNCTURE: CPT | Performed by: HOSPITALIST

## 2022-06-26 PROCEDURE — 94640 AIRWAY INHALATION TREATMENT: CPT

## 2022-06-26 PROCEDURE — 80053 COMPREHEN METABOLIC PANEL: CPT | Performed by: HOSPITALIST

## 2022-06-26 PROCEDURE — 258N000003 HC RX IP 258 OP 636: Performed by: HOSPITALIST

## 2022-06-26 PROCEDURE — 999N000190 HC STATISTIC VAT ROUNDS

## 2022-06-26 PROCEDURE — 85027 COMPLETE CBC AUTOMATED: CPT | Performed by: HOSPITALIST

## 2022-06-26 RX ADMIN — ACETYLCYSTEINE 2 ML: 200 SOLUTION ORAL; RESPIRATORY (INHALATION) at 19:23

## 2022-06-26 RX ADMIN — CARBAMAZEPINE 150 MG: 100 SUSPENSION ORAL at 12:30

## 2022-06-26 RX ADMIN — BRIVARACETAM 100 MG: 10 SOLUTION ORAL at 23:02

## 2022-06-26 RX ADMIN — MICONAZOLE NITRATE: 20 POWDER TOPICAL at 08:36

## 2022-06-26 RX ADMIN — SODIUM BICARBONATE 650 MG TABLET 650 MG: at 08:55

## 2022-06-26 RX ADMIN — BRIVARACETAM 100 MG: 10 SOLUTION ORAL at 08:34

## 2022-06-26 RX ADMIN — IPRATROPIUM BROMIDE AND ALBUTEROL SULFATE 3 ML: .5; 3 SOLUTION RESPIRATORY (INHALATION) at 15:39

## 2022-06-26 RX ADMIN — PIPERACILLIN SODIUM AND TAZOBACTAM SODIUM 4.5 G: 4; .5 INJECTION, POWDER, LYOPHILIZED, FOR SOLUTION INTRAVENOUS at 02:47

## 2022-06-26 RX ADMIN — METOCLOPRAMIDE HYDROCHLORIDE 10 MG: 5 SOLUTION ORAL at 08:55

## 2022-06-26 RX ADMIN — DEXAMETHASONE SODIUM PHOSPHATE 6 MG: 4 INJECTION, SOLUTION INTRAMUSCULAR; INTRAVENOUS at 12:30

## 2022-06-26 RX ADMIN — Medication 40 MG: at 08:56

## 2022-06-26 RX ADMIN — HYDROCORTISONE 15 MG: 10 TABLET ORAL at 08:54

## 2022-06-26 RX ADMIN — ACETYLCYSTEINE 2 ML: 200 SOLUTION ORAL; RESPIRATORY (INHALATION) at 12:09

## 2022-06-26 RX ADMIN — HYDROCORTISONE 5 MG: 5 TABLET ORAL at 17:08

## 2022-06-26 RX ADMIN — PIPERACILLIN SODIUM AND TAZOBACTAM SODIUM 4.5 G: 4; .5 INJECTION, POWDER, LYOPHILIZED, FOR SOLUTION INTRAVENOUS at 08:49

## 2022-06-26 RX ADMIN — CALCIUM CARBONATE 1250 MG: 1250 SUSPENSION ORAL at 17:08

## 2022-06-26 RX ADMIN — METOCLOPRAMIDE HYDROCHLORIDE 10 MG: 5 SOLUTION ORAL at 12:28

## 2022-06-26 RX ADMIN — ASPIRIN 81 MG CHEWABLE TABLET 81 MG: 81 TABLET CHEWABLE at 08:54

## 2022-06-26 RX ADMIN — ACETYLCYSTEINE 2 ML: 200 SOLUTION ORAL; RESPIRATORY (INHALATION) at 15:39

## 2022-06-26 RX ADMIN — SODIUM BICARBONATE 650 MG TABLET 650 MG: at 23:02

## 2022-06-26 RX ADMIN — ENOXAPARIN SODIUM 40 MG: 40 INJECTION SUBCUTANEOUS at 12:25

## 2022-06-26 RX ADMIN — CARBAMAZEPINE 100 MG: 100 SUSPENSION ORAL at 17:07

## 2022-06-26 RX ADMIN — LEVOTHYROXINE SODIUM 150 MCG: 150 TABLET ORAL at 06:24

## 2022-06-26 RX ADMIN — PIPERACILLIN SODIUM AND TAZOBACTAM SODIUM 4.5 G: 4; .5 INJECTION, POWDER, LYOPHILIZED, FOR SOLUTION INTRAVENOUS at 22:23

## 2022-06-26 RX ADMIN — CARBAMAZEPINE 150 MG: 100 SUSPENSION ORAL at 06:25

## 2022-06-26 RX ADMIN — Medication 15 ML: at 17:07

## 2022-06-26 RX ADMIN — ACETYLCYSTEINE 2 ML: 200 SOLUTION ORAL; RESPIRATORY (INHALATION) at 08:05

## 2022-06-26 RX ADMIN — IPRATROPIUM BROMIDE AND ALBUTEROL SULFATE 3 ML: .5; 3 SOLUTION RESPIRATORY (INHALATION) at 08:05

## 2022-06-26 RX ADMIN — METOCLOPRAMIDE HYDROCHLORIDE 10 MG: 5 SOLUTION ORAL at 23:02

## 2022-06-26 RX ADMIN — MICONAZOLE NITRATE: 20 POWDER TOPICAL at 22:24

## 2022-06-26 RX ADMIN — CALCIUM CARBONATE 1250 MG: 1250 SUSPENSION ORAL at 08:56

## 2022-06-26 RX ADMIN — IPRATROPIUM BROMIDE AND ALBUTEROL SULFATE 3 ML: .5; 3 SOLUTION RESPIRATORY (INHALATION) at 19:23

## 2022-06-26 RX ADMIN — IPRATROPIUM BROMIDE AND ALBUTEROL SULFATE 3 ML: .5; 3 SOLUTION RESPIRATORY (INHALATION) at 12:09

## 2022-06-26 RX ADMIN — BARICITINIB 4 MG: 2 TABLET, FILM COATED ORAL at 17:08

## 2022-06-26 RX ADMIN — CARBAMAZEPINE 150 MG: 100 SUSPENSION ORAL at 01:08

## 2022-06-26 RX ADMIN — PIPERACILLIN SODIUM AND TAZOBACTAM SODIUM 4.5 G: 4; .5 INJECTION, POWDER, LYOPHILIZED, FOR SOLUTION INTRAVENOUS at 15:00

## 2022-06-26 RX ADMIN — CALCIUM CARBONATE 1250 MG: 1250 SUSPENSION ORAL at 23:02

## 2022-06-26 RX ADMIN — METOCLOPRAMIDE HYDROCHLORIDE 10 MG: 5 SOLUTION ORAL at 17:08

## 2022-06-26 RX ADMIN — VANCOMYCIN HYDROCHLORIDE 1500 MG: 5 INJECTION, POWDER, LYOPHILIZED, FOR SOLUTION INTRAVENOUS at 22:57

## 2022-06-26 ASSESSMENT — ACTIVITIES OF DAILY LIVING (ADL)
ADLS_ACUITY_SCORE: 63
ADLS_ACUITY_SCORE: 65
ADLS_ACUITY_SCORE: 69
ADLS_ACUITY_SCORE: 67
ADLS_ACUITY_SCORE: 69

## 2022-06-26 NOTE — PROGRESS NOTES
Pt is on room air with 02 Sats 98%. Lung sounds diminished. Neb tx given as schedule. Will continue to follow.  6/26/2022  Steven Padilla, RT

## 2022-06-26 NOTE — PHARMACY-VANCOMYCIN DOSING SERVICE
"Pharmacy Vancomycin Note  Date of Service 2022  Patient's  1960   61 year old, male    Indication: Healthcare-Associated Pneumonia  Day of Therapy: 2 (new regimen started 22. Was previously ordered from -)  Current vancomycin regimen:  1500 mg IV q18h  Current vancomycin monitoring method: AUC  Current vancomycin therapeutic monitoring goal: 400-600 mg*h/L    Insight[employer] Prediction of Current Vancomycin Regimen  Regimen: 1500 mg IV every 18 hours.  AUC24,ss: 647 mg/L.hr  Probability of AUC24 > 400: 100 %  Ctrough,ss: 19.1 mg/L  Probability of Ctrough,ss > 20: 41 %  Probability of nephrotoxicity (Lodise ERNESTO ): 16 %    Current estimated CrCl = Estimated Creatinine Clearance: 97.4 mL/min (based on SCr of 0.78 mg/dL).    Creatinine for last 3 days  2022:  2:22 PM Creatinine 0.83 mg/dL  2022:  9:16 AM Creatinine 0.83 mg/dL  2022: 10:16 AM Creatinine 0.78 mg/dL    Recent Vancomycin Levels (past 3 days)  2022:  8:50 PM Vancomycin 18.2 mg/L    Vancomycin IV Administrations (past 72 hours)                   vancomycin 1500 mg in 0.9% NaCl 250 ml intermittent infusion 1,500 mg (mg) 1,500 mg New Bag 22 0430     1,500 mg New Bag 22 1120                Nephrotoxins and other renal medications (From now, onward)    Start     Dose/Rate Route Frequency Ordered Stop    22 2200  vancomycin 1500 mg in 0.9% NaCl 250 ml intermittent infusion 1,500 mg         1,500 mg  over 90 Minutes Intravenous EVERY 24 HOURS 22 2156      22 1137  ibuprofen (ADVIL/MOTRIN) suspension 400 mg         400 mg Oral EVERY 6 HOURS PRN 22 1137      22 1330  piperacillin-tazobactam (ZOSYN) 4.5 g vial to attach to  mL bag        Note to Pharmacy: For [facility], [facility] and [facility]: For Zosyn-naive patients, use the \"Zosyn initial dose + extended infusion\" order panel.    4.5 g  over 30 Minutes Intravenous EVERY 6 HOURS 22 1306               Contrast Orders - past 72 hours " (72h ago, onward)    None          Interpretation of levels and current regimen:  Vancomycin level is reflective of AUC greater than 600    Has serum creatinine changed greater than 50% in last 72 hours: No    Urine output:  unable to determine    Renal Function: Stable    InsightRX Prediction of Planned New Vancomycin Regimen  Regimen: 1500 mg IV every 24 hours.  AUC24,ss: 495 mg/L.hr  Probability of AUC24 > 400: 92 %  Ctrough,ss: 13.3 mg/L  Probability of Ctrough,ss > 20: 4 %  Probability of nephrotoxicity (Lodise ERNESTO 2009): 8 %    Plan:  1. Decrease Dose to 1500 mg q24h  2. Vancomycin monitoring method: AUC  3. Vancomycin therapeutic monitoring goal: 400-600 mg*h/L  4. Pharmacy will check vancomycin levels as appropriate in 1-3 Days.  5. Serum creatinine levels will be ordered a minimum of twice weekly.    Christa Pierson, [facility]
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
## 2022-06-27 LAB
ALBUMIN SERPL-MCNC: 2.7 G/DL (ref 3.4–5)
ALP SERPL-CCNC: 111 U/L (ref 40–150)
ALT SERPL W P-5'-P-CCNC: 65 U/L (ref 0–70)
ANION GAP SERPL CALCULATED.3IONS-SCNC: 4 MMOL/L (ref 3–14)
AST SERPL W P-5'-P-CCNC: 47 U/L (ref 0–45)
BILIRUB SERPL-MCNC: 0.3 MG/DL (ref 0.2–1.3)
BUN SERPL-MCNC: 21 MG/DL (ref 7–30)
CALCIUM SERPL-MCNC: 8.4 MG/DL (ref 8.5–10.1)
CHLORIDE BLD-SCNC: 104 MMOL/L (ref 94–109)
CO2 SERPL-SCNC: 27 MMOL/L (ref 20–32)
CREAT SERPL-MCNC: 0.9 MG/DL (ref 0.66–1.25)
ERYTHROCYTE [DISTWIDTH] IN BLOOD BY AUTOMATED COUNT: 14.1 % (ref 10–15)
GFR SERPL CREATININE-BSD FRML MDRD: >90 ML/MIN/1.73M2
GLUCOSE BLD-MCNC: 166 MG/DL (ref 70–99)
GLUCOSE BLDC GLUCOMTR-MCNC: 136 MG/DL (ref 70–99)
GLUCOSE BLDC GLUCOMTR-MCNC: 155 MG/DL (ref 70–99)
GLUCOSE BLDC GLUCOMTR-MCNC: 192 MG/DL (ref 70–99)
GLUCOSE BLDC GLUCOMTR-MCNC: 194 MG/DL (ref 70–99)
GLUCOSE BLDC GLUCOMTR-MCNC: 218 MG/DL (ref 70–99)
GLUCOSE BLDC GLUCOMTR-MCNC: 85 MG/DL (ref 70–99)
HCT VFR BLD AUTO: 36.4 % (ref 40–53)
HGB BLD-MCNC: 11.5 G/DL (ref 13.3–17.7)
MAGNESIUM SERPL-MCNC: 2.5 MG/DL (ref 1.6–2.3)
MCH RBC QN AUTO: 29.3 PG (ref 26.5–33)
MCHC RBC AUTO-ENTMCNC: 31.6 G/DL (ref 31.5–36.5)
MCV RBC AUTO: 93 FL (ref 78–100)
PHOSPHATE SERPL-MCNC: 2.2 MG/DL (ref 2.5–4.5)
PLATELET # BLD AUTO: 204 10E3/UL (ref 150–450)
POTASSIUM BLD-SCNC: 3.8 MMOL/L (ref 3.4–5.3)
PROT SERPL-MCNC: 7.2 G/DL (ref 6.8–8.8)
RBC # BLD AUTO: 3.93 10E6/UL (ref 4.4–5.9)
SODIUM SERPL-SCNC: 135 MMOL/L (ref 133–144)
WBC # BLD AUTO: 11.7 10E3/UL (ref 4–11)

## 2022-06-27 PROCEDURE — 83735 ASSAY OF MAGNESIUM: CPT | Performed by: HOSPITALIST

## 2022-06-27 PROCEDURE — 36415 COLL VENOUS BLD VENIPUNCTURE: CPT | Performed by: HOSPITALIST

## 2022-06-27 PROCEDURE — 999N000157 HC STATISTIC RCP TIME EA 10 MIN

## 2022-06-27 PROCEDURE — 250N000013 HC RX MED GY IP 250 OP 250 PS 637: Performed by: HOSPITALIST

## 2022-06-27 PROCEDURE — 250N000011 HC RX IP 250 OP 636: Performed by: HOSPITALIST

## 2022-06-27 PROCEDURE — 85027 COMPLETE CBC AUTOMATED: CPT | Performed by: HOSPITALIST

## 2022-06-27 PROCEDURE — 94640 AIRWAY INHALATION TREATMENT: CPT | Mod: 76

## 2022-06-27 PROCEDURE — 120N000001 HC R&B MED SURG/OB

## 2022-06-27 PROCEDURE — 80053 COMPREHEN METABOLIC PANEL: CPT | Performed by: HOSPITALIST

## 2022-06-27 PROCEDURE — 258N000003 HC RX IP 258 OP 636: Performed by: HOSPITALIST

## 2022-06-27 PROCEDURE — 94640 AIRWAY INHALATION TREATMENT: CPT

## 2022-06-27 PROCEDURE — 99232 SBSQ HOSP IP/OBS MODERATE 35: CPT | Performed by: HOSPITALIST

## 2022-06-27 PROCEDURE — 999N000190 HC STATISTIC VAT ROUNDS

## 2022-06-27 PROCEDURE — 84100 ASSAY OF PHOSPHORUS: CPT | Performed by: HOSPITALIST

## 2022-06-27 PROCEDURE — 250N000009 HC RX 250: Performed by: HOSPITALIST

## 2022-06-27 RX ADMIN — BARICITINIB 4 MG: 2 TABLET, FILM COATED ORAL at 17:16

## 2022-06-27 RX ADMIN — METOCLOPRAMIDE HYDROCHLORIDE 10 MG: 5 SOLUTION ORAL at 11:16

## 2022-06-27 RX ADMIN — Medication 15 ML: at 17:17

## 2022-06-27 RX ADMIN — SODIUM BICARBONATE 650 MG TABLET 650 MG: at 08:00

## 2022-06-27 RX ADMIN — PIPERACILLIN SODIUM AND TAZOBACTAM SODIUM 4.5 G: 4; .5 INJECTION, POWDER, LYOPHILIZED, FOR SOLUTION INTRAVENOUS at 20:57

## 2022-06-27 RX ADMIN — HYDROCORTISONE 5 MG: 5 TABLET ORAL at 17:16

## 2022-06-27 RX ADMIN — CALCIUM CARBONATE 1250 MG: 1250 SUSPENSION ORAL at 08:02

## 2022-06-27 RX ADMIN — PIPERACILLIN SODIUM AND TAZOBACTAM SODIUM 4.5 G: 4; .5 INJECTION, POWDER, LYOPHILIZED, FOR SOLUTION INTRAVENOUS at 03:02

## 2022-06-27 RX ADMIN — CARBAMAZEPINE 100 MG: 100 SUSPENSION ORAL at 17:16

## 2022-06-27 RX ADMIN — CARBAMAZEPINE 150 MG: 100 SUSPENSION ORAL at 06:23

## 2022-06-27 RX ADMIN — ACETYLCYSTEINE 2 ML: 200 SOLUTION ORAL; RESPIRATORY (INHALATION) at 19:55

## 2022-06-27 RX ADMIN — VANCOMYCIN HYDROCHLORIDE 1500 MG: 5 INJECTION, POWDER, LYOPHILIZED, FOR SOLUTION INTRAVENOUS at 22:17

## 2022-06-27 RX ADMIN — CARBAMAZEPINE 150 MG: 100 SUSPENSION ORAL at 00:33

## 2022-06-27 RX ADMIN — MICONAZOLE NITRATE: 20 POWDER TOPICAL at 11:13

## 2022-06-27 RX ADMIN — CARBAMAZEPINE 150 MG: 100 SUSPENSION ORAL at 11:16

## 2022-06-27 RX ADMIN — METOCLOPRAMIDE HYDROCHLORIDE 10 MG: 5 SOLUTION ORAL at 17:16

## 2022-06-27 RX ADMIN — Medication 40 MG: at 08:01

## 2022-06-27 RX ADMIN — IPRATROPIUM BROMIDE AND ALBUTEROL SULFATE 3 ML: .5; 3 SOLUTION RESPIRATORY (INHALATION) at 15:49

## 2022-06-27 RX ADMIN — IPRATROPIUM BROMIDE AND ALBUTEROL SULFATE 3 ML: .5; 3 SOLUTION RESPIRATORY (INHALATION) at 07:29

## 2022-06-27 RX ADMIN — HYDROCORTISONE 15 MG: 10 TABLET ORAL at 08:00

## 2022-06-27 RX ADMIN — METOCLOPRAMIDE HYDROCHLORIDE 10 MG: 5 SOLUTION ORAL at 21:02

## 2022-06-27 RX ADMIN — IPRATROPIUM BROMIDE AND ALBUTEROL SULFATE 3 ML: .5; 3 SOLUTION RESPIRATORY (INHALATION) at 19:55

## 2022-06-27 RX ADMIN — ENOXAPARIN SODIUM 40 MG: 40 INJECTION SUBCUTANEOUS at 11:16

## 2022-06-27 RX ADMIN — IPRATROPIUM BROMIDE AND ALBUTEROL SULFATE 3 ML: .5; 3 SOLUTION RESPIRATORY (INHALATION) at 11:01

## 2022-06-27 RX ADMIN — LEVOTHYROXINE SODIUM 150 MCG: 150 TABLET ORAL at 06:22

## 2022-06-27 RX ADMIN — MICONAZOLE NITRATE: 20 POWDER TOPICAL at 20:59

## 2022-06-27 RX ADMIN — SODIUM BICARBONATE 650 MG TABLET 650 MG: at 21:00

## 2022-06-27 RX ADMIN — ASPIRIN 81 MG CHEWABLE TABLET 81 MG: 81 TABLET CHEWABLE at 08:01

## 2022-06-27 RX ADMIN — CALCIUM CARBONATE 1250 MG: 1250 SUSPENSION ORAL at 21:02

## 2022-06-27 RX ADMIN — CALCIUM CARBONATE 1250 MG: 1250 SUSPENSION ORAL at 17:16

## 2022-06-27 RX ADMIN — ACETYLCYSTEINE 2 ML: 200 SOLUTION ORAL; RESPIRATORY (INHALATION) at 07:30

## 2022-06-27 RX ADMIN — PIPERACILLIN SODIUM AND TAZOBACTAM SODIUM 4.5 G: 4; .5 INJECTION, POWDER, LYOPHILIZED, FOR SOLUTION INTRAVENOUS at 08:01

## 2022-06-27 RX ADMIN — ACETYLCYSTEINE 2 ML: 200 SOLUTION ORAL; RESPIRATORY (INHALATION) at 15:49

## 2022-06-27 RX ADMIN — BRIVARACETAM 100 MG: 10 SOLUTION ORAL at 08:01

## 2022-06-27 RX ADMIN — BRIVARACETAM 100 MG: 10 SOLUTION ORAL at 21:02

## 2022-06-27 RX ADMIN — ACETYLCYSTEINE 2 ML: 200 SOLUTION ORAL; RESPIRATORY (INHALATION) at 11:01

## 2022-06-27 RX ADMIN — DEXAMETHASONE SODIUM PHOSPHATE 6 MG: 4 INJECTION, SOLUTION INTRAMUSCULAR; INTRAVENOUS at 12:49

## 2022-06-27 RX ADMIN — PIPERACILLIN SODIUM AND TAZOBACTAM SODIUM 4.5 G: 4; .5 INJECTION, POWDER, LYOPHILIZED, FOR SOLUTION INTRAVENOUS at 14:49

## 2022-06-27 RX ADMIN — METOCLOPRAMIDE HYDROCHLORIDE 10 MG: 5 SOLUTION ORAL at 08:00

## 2022-06-27 ASSESSMENT — ACTIVITIES OF DAILY LIVING (ADL)
ADLS_ACUITY_SCORE: 67
ADLS_ACUITY_SCORE: 69
ADLS_ACUITY_SCORE: 67
ADLS_ACUITY_SCORE: 69

## 2022-06-27 NOTE — PROGRESS NOTES
[facility]  Infectious Disease Progress Note          Assessment and Plan:   IMPRESSION:     1.  A 61-year-old male, very well known to us, including recent admission and discharge with strep bacteremia, now admitted with respiratory insufficiency, COVID-19 positive, also possible pneumonia or aspiration as part of diagnosis.  2.  Recent Strep salivarius bacteremia, transesophageal echocardiogram negative, but in the midst of an extended IV course.  3.  History of numerous admissions, frequently as monthly with aspiration, sepsis, often Pseudomonas colonized sputum as cause.  4.  Traumatic brain injury with major chronic neurologic abnormalities.  5.  COVID-19, now being treated.  6.  MRSA (methicillin-resistant Staphylococcus aureus) and VRE (vancomycin-resistant Enterococcus) colonization.     RECOMMENDATIONS:     1.  fevers last 24 hours, now afebrile, ? Covid, resp status has been ok.   2.  Treat COVID-19 as we are doing.  3.  Ongoing discussions with family regarding level of care.  4. Day 11/ 14 of antibiotics         Interval History:   no new complaints looks Ok O2 better T down cxs pending Bc neg              Medications:       acetylcysteine  2 mL Nebulization 4x Daily     aspirin  81 mg Oral or Feeding Tube Daily     baricitinib  4 mg Oral Daily     Brivaracetam  100 mg Oral or Feeding Tube BID     calcium carbonate  1,250 mg Oral or Feeding Tube TID     carBAMazepine  100 mg Oral or Feeding Tube Daily     carBAMazepine  150 mg Oral or Feeding Tube TID     dexamethasone  6 mg Intravenous Q24H     enoxaparin ANTICOAGULANT  40 mg Subcutaneous Q24H     hydrocortisone  15 mg Oral or Feeding Tube Daily     hydrocortisone  5 mg Oral or Feeding Tube Daily at 4 pm     insulin aspart  1-12 Units Subcutaneous Q4H     ipratropium - albuterol 0.5 mg/2.5 mg/3 mL  3 mL Nebulization 4x daily     levothyroxine  150 mcg Per Feeding Tube QAM AC     metoclopramide  10 mg Per Feeding Tube 4x Daily AC  & HS     miconazole   Topical BID     multivitamins w/minerals  15 mL Per J Tube Daily     pantoprazole  40 mg Per J Tube Daily     piperacillin-tazobactam  4.5 g Intravenous Q6H     sodium bicarbonate  650 mg Oral or Feeding Tube BID     vancomycin  1,500 mg Intravenous Q24H                  Physical Exam:   Blood pressure 110/60, pulse 69, temperature 97.5  F (36.4  C), resp. rate 18, height 1.829 m (6'), weight 69 kg (152 lb 1.9 oz), SpO2 98 %.  Wt Readings from Last 2 Encounters:   06/27/22 69 kg (152 lb 1.9 oz)   06/07/22 71 kg (156 lb 8 oz)     Vital Signs with Ranges  Temp:  [97.2  F (36.2  C)-97.7  F (36.5  C)] 97.5  F (36.4  C)  Pulse:  [56-69] 69  Resp:  [16-18] 18  BP: (110-129)/(51-66) 110/60  SpO2:  [96 %-100 %] 98 %    Constitutional: Awake, alert, cooperative, no apparent distress looks like usual self   Lungs: Clear to auscultation bilaterally, no crackles or wheezing   Cardiovascular: Regular rate and rhythm, normal S1 and S2, and no murmur noted   Abdomen: Normal bowel sounds, soft, non-distended, non-tender   Skin: No rashes, no cyanosis, no edema   Other:           Data:   All microbiology laboratory data reviewed.  Recent Labs   Lab Test 06/27/22  1115 06/26/22  0825 06/25/22  1016   WBC 11.7* 8.4 6.7   HGB 11.5* 12.3* 11.3*   HCT 36.4* 38.7* 35.6*   MCV 93 92 92    219 163     Recent Labs   Lab Test 06/27/22  1115 06/26/22  0825 06/25/22  1016   CR 0.90 0.81 0.78     No lab results found.  Recent Labs   Lab Test 05/23/21  0316 05/23/21  0250 04/15/21  2250 02/22/21  1622 02/22/21  1439 02/22/21  1413 02/19/21  1155 02/19/21  1123 01/15/21  0214   CULT No growth No growth No growth No growth No growth No growth No growth No growth >100,000 colonies/mL  Klebsiella pneumoniae  *
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
## 2022-06-27 NOTE — PROVIDER NOTIFICATION
Dr. Keita-oncall Hospitalist paged and notified of pt's Jejunostomy port not working/not able to be flushed.  Hospitalist advised to contact [facility] for recommendations for unclogging pt's G-J tube as Gastrostomy port cannot be used due to pt's high risk for aspiration.

## 2022-06-28 LAB
CREAT SERPL-MCNC: 0.94 MG/DL (ref 0.66–1.25)
ERYTHROCYTE [DISTWIDTH] IN BLOOD BY AUTOMATED COUNT: 14.3 % (ref 10–15)
GFR SERPL CREATININE-BSD FRML MDRD: >90 ML/MIN/1.73M2
GLUCOSE BLDC GLUCOMTR-MCNC: 114 MG/DL (ref 70–99)
GLUCOSE BLDC GLUCOMTR-MCNC: 145 MG/DL (ref 70–99)
GLUCOSE BLDC GLUCOMTR-MCNC: 154 MG/DL (ref 70–99)
GLUCOSE BLDC GLUCOMTR-MCNC: 160 MG/DL (ref 70–99)
GLUCOSE BLDC GLUCOMTR-MCNC: 170 MG/DL (ref 70–99)
GLUCOSE BLDC GLUCOMTR-MCNC: 69 MG/DL (ref 70–99)
GLUCOSE BLDC GLUCOMTR-MCNC: 96 MG/DL (ref 70–99)
HCT VFR BLD AUTO: 39.6 % (ref 40–53)
HGB BLD-MCNC: 12.4 G/DL (ref 13.3–17.7)
HOLD SPECIMEN: NORMAL
MCH RBC QN AUTO: 29.2 PG (ref 26.5–33)
MCHC RBC AUTO-ENTMCNC: 31.3 G/DL (ref 31.5–36.5)
MCV RBC AUTO: 93 FL (ref 78–100)
PHOSPHATE SERPL-MCNC: 2.5 MG/DL (ref 2.5–4.5)
PLATELET # BLD AUTO: 221 10E3/UL (ref 150–450)
POTASSIUM BLD-SCNC: 4.5 MMOL/L (ref 3.4–5.3)
RBC # BLD AUTO: 4.24 10E6/UL (ref 4.4–5.9)
VANCOMYCIN SERPL-MCNC: 20.8 MG/L
WBC # BLD AUTO: 13.7 10E3/UL (ref 4–11)

## 2022-06-28 PROCEDURE — 99232 SBSQ HOSP IP/OBS MODERATE 35: CPT | Performed by: HOSPITALIST

## 2022-06-28 PROCEDURE — 94640 AIRWAY INHALATION TREATMENT: CPT | Mod: 76

## 2022-06-28 PROCEDURE — 250N000013 HC RX MED GY IP 250 OP 250 PS 637: Performed by: HOSPITALIST

## 2022-06-28 PROCEDURE — 250N000011 HC RX IP 250 OP 636: Performed by: HOSPITALIST

## 2022-06-28 PROCEDURE — 82565 ASSAY OF CREATININE: CPT | Performed by: HOSPITALIST

## 2022-06-28 PROCEDURE — 999N000190 HC STATISTIC VAT ROUNDS

## 2022-06-28 PROCEDURE — 94640 AIRWAY INHALATION TREATMENT: CPT

## 2022-06-28 PROCEDURE — 84100 ASSAY OF PHOSPHORUS: CPT | Performed by: HOSPITALIST

## 2022-06-28 PROCEDURE — 250N000009 HC RX 250: Performed by: HOSPITALIST

## 2022-06-28 PROCEDURE — 120N000001 HC R&B MED SURG/OB

## 2022-06-28 PROCEDURE — 999N000040 HC STATISTIC CONSULT NO CHARGE VASC ACCESS

## 2022-06-28 PROCEDURE — 36415 COLL VENOUS BLD VENIPUNCTURE: CPT | Performed by: HOSPITALIST

## 2022-06-28 PROCEDURE — 84132 ASSAY OF SERUM POTASSIUM: CPT | Performed by: HOSPITALIST

## 2022-06-28 PROCEDURE — 85027 COMPLETE CBC AUTOMATED: CPT | Performed by: HOSPITALIST

## 2022-06-28 PROCEDURE — 80202 ASSAY OF VANCOMYCIN: CPT | Performed by: HOSPITALIST

## 2022-06-28 PROCEDURE — 258N000003 HC RX IP 258 OP 636: Performed by: HOSPITALIST

## 2022-06-28 PROCEDURE — 999N000157 HC STATISTIC RCP TIME EA 10 MIN

## 2022-06-28 PROCEDURE — 258N000001 HC RX 258: Performed by: HOSPITALIST

## 2022-06-28 RX ORDER — WATER 10 ML/10ML
INJECTION INTRAMUSCULAR; INTRAVENOUS; SUBCUTANEOUS
Status: DISPENSED
Start: 2022-06-28 | End: 2022-06-28

## 2022-06-28 RX ADMIN — PIPERACILLIN SODIUM AND TAZOBACTAM SODIUM 4.5 G: 4; .5 INJECTION, POWDER, LYOPHILIZED, FOR SOLUTION INTRAVENOUS at 21:51

## 2022-06-28 RX ADMIN — IPRATROPIUM BROMIDE AND ALBUTEROL SULFATE 3 ML: .5; 3 SOLUTION RESPIRATORY (INHALATION) at 14:55

## 2022-06-28 RX ADMIN — IPRATROPIUM BROMIDE AND ALBUTEROL SULFATE 3 ML: .5; 3 SOLUTION RESPIRATORY (INHALATION) at 07:30

## 2022-06-28 RX ADMIN — METOCLOPRAMIDE HYDROCHLORIDE 10 MG: 5 SOLUTION ORAL at 16:31

## 2022-06-28 RX ADMIN — PIPERACILLIN SODIUM AND TAZOBACTAM SODIUM 4.5 G: 4; .5 INJECTION, POWDER, LYOPHILIZED, FOR SOLUTION INTRAVENOUS at 02:14

## 2022-06-28 RX ADMIN — METOCLOPRAMIDE HYDROCHLORIDE 10 MG: 5 SOLUTION ORAL at 12:30

## 2022-06-28 RX ADMIN — Medication 15 ML: at 18:18

## 2022-06-28 RX ADMIN — ASPIRIN 81 MG CHEWABLE TABLET 81 MG: 81 TABLET CHEWABLE at 08:18

## 2022-06-28 RX ADMIN — POTASSIUM & SODIUM PHOSPHATES POWDER PACK 280-160-250 MG 1 PACKET: 280-160-250 PACK at 06:47

## 2022-06-28 RX ADMIN — ACETYLCYSTEINE 2 ML: 200 SOLUTION ORAL; RESPIRATORY (INHALATION) at 14:55

## 2022-06-28 RX ADMIN — CARBAMAZEPINE 150 MG: 100 SUSPENSION ORAL at 00:24

## 2022-06-28 RX ADMIN — IPRATROPIUM BROMIDE AND ALBUTEROL SULFATE 3 ML: .5; 3 SOLUTION RESPIRATORY (INHALATION) at 12:01

## 2022-06-28 RX ADMIN — LEVOTHYROXINE SODIUM 150 MCG: 150 TABLET ORAL at 06:47

## 2022-06-28 RX ADMIN — CARBAMAZEPINE 150 MG: 100 SUSPENSION ORAL at 12:30

## 2022-06-28 RX ADMIN — CALCIUM CARBONATE 1250 MG: 1250 SUSPENSION ORAL at 08:18

## 2022-06-28 RX ADMIN — METOCLOPRAMIDE HYDROCHLORIDE 10 MG: 5 SOLUTION ORAL at 21:54

## 2022-06-28 RX ADMIN — CALCIUM CARBONATE 1250 MG: 1250 SUSPENSION ORAL at 21:55

## 2022-06-28 RX ADMIN — CALCIUM CARBONATE 1250 MG: 1250 SUSPENSION ORAL at 16:32

## 2022-06-28 RX ADMIN — ALTEPLASE 2 MG: 2.2 INJECTION, POWDER, LYOPHILIZED, FOR SOLUTION INTRAVENOUS at 11:59

## 2022-06-28 RX ADMIN — DEXTROSE MONOHYDRATE 25 ML: 25 INJECTION, SOLUTION INTRAVENOUS at 08:42

## 2022-06-28 RX ADMIN — HYDROCORTISONE 15 MG: 10 TABLET ORAL at 08:17

## 2022-06-28 RX ADMIN — CARBAMAZEPINE 100 MG: 100 SUSPENSION ORAL at 18:22

## 2022-06-28 RX ADMIN — VANCOMYCIN HYDROCHLORIDE 1250 MG: 5 INJECTION, POWDER, LYOPHILIZED, FOR SOLUTION INTRAVENOUS at 22:01

## 2022-06-28 RX ADMIN — ACETYLCYSTEINE 2 ML: 200 SOLUTION ORAL; RESPIRATORY (INHALATION) at 07:30

## 2022-06-28 RX ADMIN — IPRATROPIUM BROMIDE AND ALBUTEROL SULFATE 3 ML: .5; 3 SOLUTION RESPIRATORY (INHALATION) at 19:49

## 2022-06-28 RX ADMIN — HYDROCORTISONE 5 MG: 5 TABLET ORAL at 16:31

## 2022-06-28 RX ADMIN — BRIVARACETAM 100 MG: 10 SOLUTION ORAL at 21:56

## 2022-06-28 RX ADMIN — PIPERACILLIN SODIUM AND TAZOBACTAM SODIUM 4.5 G: 4; .5 INJECTION, POWDER, LYOPHILIZED, FOR SOLUTION INTRAVENOUS at 08:17

## 2022-06-28 RX ADMIN — MICONAZOLE NITRATE: 20 POWDER TOPICAL at 08:20

## 2022-06-28 RX ADMIN — SODIUM BICARBONATE 650 MG TABLET 650 MG: at 22:01

## 2022-06-28 RX ADMIN — BARICITINIB 4 MG: 2 TABLET, FILM COATED ORAL at 16:31

## 2022-06-28 RX ADMIN — SODIUM BICARBONATE 650 MG TABLET 650 MG: at 08:17

## 2022-06-28 RX ADMIN — POTASSIUM & SODIUM PHOSPHATES POWDER PACK 280-160-250 MG 1 PACKET: 280-160-250 PACK at 10:33

## 2022-06-28 RX ADMIN — ACETYLCYSTEINE 2 ML: 200 SOLUTION ORAL; RESPIRATORY (INHALATION) at 19:48

## 2022-06-28 RX ADMIN — MICONAZOLE NITRATE: 20 POWDER TOPICAL at 21:55

## 2022-06-28 RX ADMIN — POTASSIUM & SODIUM PHOSPHATES POWDER PACK 280-160-250 MG 1 PACKET: 280-160-250 PACK at 02:09

## 2022-06-28 RX ADMIN — ACETYLCYSTEINE 2 ML: 200 SOLUTION ORAL; RESPIRATORY (INHALATION) at 12:01

## 2022-06-28 RX ADMIN — Medication 40 MG: at 08:18

## 2022-06-28 RX ADMIN — CARBAMAZEPINE 150 MG: 100 SUSPENSION ORAL at 06:47

## 2022-06-28 RX ADMIN — CARBAMAZEPINE 150 MG: 100 SUSPENSION ORAL at 23:01

## 2022-06-28 RX ADMIN — PIPERACILLIN SODIUM AND TAZOBACTAM SODIUM 4.5 G: 4; .5 INJECTION, POWDER, LYOPHILIZED, FOR SOLUTION INTRAVENOUS at 14:14

## 2022-06-28 RX ADMIN — METOCLOPRAMIDE HYDROCHLORIDE 10 MG: 5 SOLUTION ORAL at 08:18

## 2022-06-28 RX ADMIN — BRIVARACETAM 100 MG: 10 SOLUTION ORAL at 08:18

## 2022-06-28 RX ADMIN — ENOXAPARIN SODIUM 40 MG: 40 INJECTION SUBCUTANEOUS at 10:33

## 2022-06-28 ASSESSMENT — ACTIVITIES OF DAILY LIVING (ADL)
ADLS_ACUITY_SCORE: 67
ADLS_ACUITY_SCORE: 67
ADLS_ACUITY_SCORE: 64
ADLS_ACUITY_SCORE: 67
ADLS_ACUITY_SCORE: 67
ADLS_ACUITY_SCORE: 64
ADLS_ACUITY_SCORE: 67

## 2022-06-28 NOTE — PHARMACY-VANCOMYCIN DOSING SERVICE
"Pharmacy Vancomycin Note  Date of Service 2022  Patient's  1960   61 year old, male    Indication: [facility]ated Pneumonia   Day of Therapy: 5 (new regimen started 22. Was previously ordered from -)  Current vancomycin regimen:  1500 mg IV q24h  Current vancomycin monitoring method: AUC  Current vancomycin therapeutic monitoring goal: 400-600 mg*h/L    [employer] Prediction of Current Vancomycin Regimen    Regimen: 1500 mg IV every 24 hours.  Exposure target: AUC24 (range)400-600 mg/L.hr   AUC24,ss: 586 mg/L.hr  Probability of AUC24 > 400: 100 %  Ctrough,ss: 17.0 mg/L  Probability of Ctrough,ss > 20: 19 %  Probability of nephrotoxicity (Lodise ERNESTO ): 13 %    Current estimated CrCl = Estimated Creatinine Clearance: 82.6 mL/min (based on SCr of 0.94 mg/dL).    Creatinine for last 3 days  2022:  8:25 AM Creatinine 0.81 mg/dL  2022: 11:15 AM Creatinine 0.90 mg/dL  2022:  8:38 AM Creatinine 0.94 mg/dL    Recent Vancomycin Levels (past 3 days)  2022:  8:50 PM Vancomycin 18.2 mg/L  2022:  2:36 PM Vancomycin 20.8 mg/L    Vancomycin IV Administrations (past 72 hours)                   vancomycin 1500 mg in 0.9% NaCl 250 ml intermittent infusion 1,500 mg (mg) 1,500 mg New Bag 22 2217     1,500 mg New Bag 22 2257     1,500 mg New Bag 22 2311                Nephrotoxins and other renal medications (From now, onward)    Start     Dose/Rate Route Frequency Ordered Stop    22 2200  vancomycin 1250 mg in 0.9% NaCl 250 mL intermittent infusion 1,250 mg         1,250 mg  over 90 Minutes Intravenous EVERY 24 HOURS 22 1736      22 1137  ibuprofen (ADVIL/MOTRIN) suspension 400 mg         400 mg Oral EVERY 6 HOURS PRN 22 1137      22 1330  piperacillin-tazobactam (ZOSYN) 4.5 g vial to attach to  mL bag        Note to Pharmacy: For [facility], [facility] and [facility]: For Zosyn-naive patients, use the \"Zosyn initial dose + extended " "infusion\" order panel.    4.5 g  over 30 Minutes Intravenous EVERY 6 HOURS 06/18/22 1306               Contrast Orders - past 72 hours (72h ago, onward)    None          Interpretation of levels and current regimen:  Vancomycin level is reflective of -600; in upper end of limits.    Has serum creatinine changed greater than 50% in last 72 hours: No    Urine output:  unable to determine; flowsheets show regular urine occurrences.    Renal Function: Stable    Insight[employer] Prediction of Planned New Vancomycin Regimen  Regimen: 1250 mg IV every 24 hours.  AUC24,ss: 497 mg/L.hr  Probability of AUC24 > 400: 95 %  Ctrough,ss: 14.4 mg/L  Probability of Ctrough,ss > 20: 5 %  Probability of nephrotoxicity (Lodise ERNESTO 2009): 10 %      Plan:  1. Decrease Dose to 1250 mg q24h to ensure within goal range with decreased renal toxicity risk and risk of Ctrough >20 (toxicity)  2. Vancomycin monitoring method: AUC  3. Vancomycin therapeutic monitoring goal: 400-600 mg*h/L  4. Pharmacy will check vancomycin levels as appropriate in 1-3 Days.  5. Serum creatinine levels will be ordered a minimum of twice weekly.    Christa Pierson [facility]    "
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
## 2022-06-29 LAB
CREAT SERPL-MCNC: 0.85 MG/DL (ref 0.66–1.25)
ERYTHROCYTE [DISTWIDTH] IN BLOOD BY AUTOMATED COUNT: 14.2 % (ref 10–15)
GFR SERPL CREATININE-BSD FRML MDRD: >90 ML/MIN/1.73M2
GLUCOSE BLDC GLUCOMTR-MCNC: 116 MG/DL (ref 70–99)
GLUCOSE BLDC GLUCOMTR-MCNC: 121 MG/DL (ref 70–99)
GLUCOSE BLDC GLUCOMTR-MCNC: 136 MG/DL (ref 70–99)
GLUCOSE BLDC GLUCOMTR-MCNC: 146 MG/DL (ref 70–99)
GLUCOSE BLDC GLUCOMTR-MCNC: 151 MG/DL (ref 70–99)
GLUCOSE BLDC GLUCOMTR-MCNC: 193 MG/DL (ref 70–99)
HCT VFR BLD AUTO: 36.9 % (ref 40–53)
HGB BLD-MCNC: 11.7 G/DL (ref 13.3–17.7)
MCH RBC QN AUTO: 28.5 PG (ref 26.5–33)
MCHC RBC AUTO-ENTMCNC: 31.7 G/DL (ref 31.5–36.5)
MCV RBC AUTO: 90 FL (ref 78–100)
PHOSPHATE SERPL-MCNC: 2.6 MG/DL (ref 2.5–4.5)
PLATELET # BLD AUTO: 205 10E3/UL (ref 150–450)
POTASSIUM BLD-SCNC: 3.8 MMOL/L (ref 3.4–5.3)
RBC # BLD AUTO: 4.1 10E6/UL (ref 4.4–5.9)
WBC # BLD AUTO: 11.3 10E3/UL (ref 4–11)

## 2022-06-29 PROCEDURE — 250N000009 HC RX 250: Performed by: HOSPITALIST

## 2022-06-29 PROCEDURE — 250N000013 HC RX MED GY IP 250 OP 250 PS 637: Performed by: HOSPITALIST

## 2022-06-29 PROCEDURE — 250N000011 HC RX IP 250 OP 636: Performed by: HOSPITALIST

## 2022-06-29 PROCEDURE — 94640 AIRWAY INHALATION TREATMENT: CPT | Mod: 76

## 2022-06-29 PROCEDURE — 85027 COMPLETE CBC AUTOMATED: CPT | Performed by: HOSPITALIST

## 2022-06-29 PROCEDURE — 999N000190 HC STATISTIC VAT ROUNDS

## 2022-06-29 PROCEDURE — 999N000157 HC STATISTIC RCP TIME EA 10 MIN

## 2022-06-29 PROCEDURE — 99232 SBSQ HOSP IP/OBS MODERATE 35: CPT | Performed by: HOSPITALIST

## 2022-06-29 PROCEDURE — 120N000001 HC R&B MED SURG/OB

## 2022-06-29 PROCEDURE — 84100 ASSAY OF PHOSPHORUS: CPT | Performed by: HOSPITALIST

## 2022-06-29 PROCEDURE — 94640 AIRWAY INHALATION TREATMENT: CPT

## 2022-06-29 PROCEDURE — 82565 ASSAY OF CREATININE: CPT | Performed by: HOSPITALIST

## 2022-06-29 PROCEDURE — 36415 COLL VENOUS BLD VENIPUNCTURE: CPT | Performed by: HOSPITALIST

## 2022-06-29 PROCEDURE — 84132 ASSAY OF SERUM POTASSIUM: CPT | Performed by: HOSPITALIST

## 2022-06-29 RX ORDER — ACETYLCYSTEINE 200 MG/ML
2 SOLUTION ORAL; RESPIRATORY (INHALATION) 3 TIMES DAILY
Status: DISCONTINUED | OUTPATIENT
Start: 2022-06-29 | End: 2022-07-01 | Stop reason: HOSPADM

## 2022-06-29 RX ORDER — IPRATROPIUM BROMIDE AND ALBUTEROL SULFATE 2.5; .5 MG/3ML; MG/3ML
3 SOLUTION RESPIRATORY (INHALATION) 3 TIMES DAILY
Status: DISCONTINUED | OUTPATIENT
Start: 2022-06-29 | End: 2022-07-01 | Stop reason: HOSPADM

## 2022-06-29 RX ADMIN — IPRATROPIUM BROMIDE AND ALBUTEROL SULFATE 3 ML: .5; 3 SOLUTION RESPIRATORY (INHALATION) at 07:34

## 2022-06-29 RX ADMIN — HYDROCORTISONE 5 MG: 5 TABLET ORAL at 16:37

## 2022-06-29 RX ADMIN — MICONAZOLE NITRATE: 20 POWDER TOPICAL at 08:36

## 2022-06-29 RX ADMIN — CALCIUM CARBONATE 1250 MG: 1250 SUSPENSION ORAL at 20:09

## 2022-06-29 RX ADMIN — ACETYLCYSTEINE 2 ML: 200 SOLUTION ORAL; RESPIRATORY (INHALATION) at 15:31

## 2022-06-29 RX ADMIN — PIPERACILLIN SODIUM AND TAZOBACTAM SODIUM 4.5 G: 4; .5 INJECTION, POWDER, LYOPHILIZED, FOR SOLUTION INTRAVENOUS at 03:59

## 2022-06-29 RX ADMIN — BRIVARACETAM 100 MG: 10 SOLUTION ORAL at 20:09

## 2022-06-29 RX ADMIN — METOCLOPRAMIDE HYDROCHLORIDE 10 MG: 5 SOLUTION ORAL at 08:33

## 2022-06-29 RX ADMIN — Medication 15 ML: at 16:38

## 2022-06-29 RX ADMIN — IPRATROPIUM BROMIDE AND ALBUTEROL SULFATE 3 ML: .5; 3 SOLUTION RESPIRATORY (INHALATION) at 15:31

## 2022-06-29 RX ADMIN — CARBAMAZEPINE 150 MG: 100 SUSPENSION ORAL at 07:07

## 2022-06-29 RX ADMIN — CALCIUM CARBONATE 1250 MG: 1250 SUSPENSION ORAL at 08:33

## 2022-06-29 RX ADMIN — ACETYLCYSTEINE 2 ML: 200 SOLUTION ORAL; RESPIRATORY (INHALATION) at 11:47

## 2022-06-29 RX ADMIN — METOCLOPRAMIDE HYDROCHLORIDE 10 MG: 5 SOLUTION ORAL at 12:00

## 2022-06-29 RX ADMIN — SODIUM BICARBONATE 650 MG TABLET 650 MG: at 08:33

## 2022-06-29 RX ADMIN — IPRATROPIUM BROMIDE AND ALBUTEROL SULFATE 3 ML: .5; 3 SOLUTION RESPIRATORY (INHALATION) at 20:41

## 2022-06-29 RX ADMIN — CARBAMAZEPINE 100 MG: 100 SUSPENSION ORAL at 17:36

## 2022-06-29 RX ADMIN — LEVOTHYROXINE SODIUM 150 MCG: 150 TABLET ORAL at 07:07

## 2022-06-29 RX ADMIN — METOCLOPRAMIDE HYDROCHLORIDE 10 MG: 5 SOLUTION ORAL at 16:37

## 2022-06-29 RX ADMIN — CALCIUM CARBONATE 1250 MG: 1250 SUSPENSION ORAL at 16:38

## 2022-06-29 RX ADMIN — SODIUM BICARBONATE 650 MG TABLET 650 MG: at 20:10

## 2022-06-29 RX ADMIN — CARBAMAZEPINE 150 MG: 100 SUSPENSION ORAL at 12:00

## 2022-06-29 RX ADMIN — ASPIRIN 81 MG CHEWABLE TABLET 81 MG: 81 TABLET CHEWABLE at 08:33

## 2022-06-29 RX ADMIN — PIPERACILLIN SODIUM AND TAZOBACTAM SODIUM 4.5 G: 4; .5 INJECTION, POWDER, LYOPHILIZED, FOR SOLUTION INTRAVENOUS at 10:32

## 2022-06-29 RX ADMIN — PIPERACILLIN SODIUM AND TAZOBACTAM SODIUM 4.5 G: 4; .5 INJECTION, POWDER, LYOPHILIZED, FOR SOLUTION INTRAVENOUS at 21:13

## 2022-06-29 RX ADMIN — PIPERACILLIN SODIUM AND TAZOBACTAM SODIUM 4.5 G: 4; .5 INJECTION, POWDER, LYOPHILIZED, FOR SOLUTION INTRAVENOUS at 16:37

## 2022-06-29 RX ADMIN — METOCLOPRAMIDE HYDROCHLORIDE 10 MG: 5 SOLUTION ORAL at 20:09

## 2022-06-29 RX ADMIN — ACETYLCYSTEINE 2 ML: 200 INHALANT RESPIRATORY (INHALATION) at 20:41

## 2022-06-29 RX ADMIN — ENOXAPARIN SODIUM 40 MG: 40 INJECTION SUBCUTANEOUS at 10:32

## 2022-06-29 RX ADMIN — ACETYLCYSTEINE 2 ML: 200 SOLUTION ORAL; RESPIRATORY (INHALATION) at 07:34

## 2022-06-29 RX ADMIN — MICONAZOLE NITRATE: 20 POWDER TOPICAL at 20:18

## 2022-06-29 RX ADMIN — HYDROCORTISONE 15 MG: 10 TABLET ORAL at 08:34

## 2022-06-29 RX ADMIN — IPRATROPIUM BROMIDE AND ALBUTEROL SULFATE 3 ML: .5; 3 SOLUTION RESPIRATORY (INHALATION) at 11:47

## 2022-06-29 RX ADMIN — BARICITINIB 4 MG: 2 TABLET, FILM COATED ORAL at 16:37

## 2022-06-29 RX ADMIN — Medication 40 MG: at 08:33

## 2022-06-29 RX ADMIN — BRIVARACETAM 100 MG: 10 SOLUTION ORAL at 09:06

## 2022-06-29 ASSESSMENT — ACTIVITIES OF DAILY LIVING (ADL)
ADLS_ACUITY_SCORE: 65
ADLS_ACUITY_SCORE: 64
ADLS_ACUITY_SCORE: 65

## 2022-06-29 NOTE — PROGRESS NOTES
[facility]  Patient is currently open to home care services with [facility]. The patient is currently receiving Skilled Nursing services.  and home health team have been notified of patient admission. The [facility] liaison will continue to follow patient during stay. If appropriate provide orders to resume home care at time of discharge.

## 2022-06-29 NOTE — PROGRESS NOTES
CLINICAL NUTRITION SERVICES - REASSESSMENT NOTE    Malnutrition: (6/18)  % Weight Loss:  None noted  % Intake:  Decreased intake does not meet criteria for malnutrition   Subcutaneous Fat Loss:  (5/12/22 - RD assessment) Orbital region moderate depletion- baseline  Muscle Loss:  (5/12/22 - RD Assessment) Temporal region moderate depletion and Clavicle bone region moderate depletion - baseline  Fluid Retention:  None noted     Malnutrition Diagnosis: Patient does not meet two of the above criteria necessary for diagnosing malnutrition at this time      EVALUATION OF PROGRESS TOWARD GOALS   Diet: NPO    Nutrition Support: Continues on usual TF regimen as follows -     Nutrition Support Enteral:  Type of Feeding Tube: Jejunostomy   Enteral Frequency:  22 hr cycle  Enteral Regimen: Jevity 1.5 at 60 mL/hr x 22 hours per day (holding TF 1 hour before and 1 hour after Synthroid dose in AM)  Total Enteral Provisions: 1980 kcal (27 kcal/kg), 84 g protein (1.2 g/kg), 28 g fiber, 1003 mL H2O  Free Water Flush: 100 mL q 4 hours     Intake/Tolerance:   - Jejunostomy clogs at times, typically unclogged within a short timeframe using clogzapper. No prolonged holds on TF recorded.   - Labs: reviewed   - Stooling: BM x1 yesterday, previous BM recorded x1 on 6/25.   - Weight: Up to 69 kg as of 6/27.   - Meds: High sliding scale insulin, Reglan 10 mg 4x daily, Thera vit M, sodium bicarb BID    ASSESSED NUTRITION NEEDS:  Dosing Weight 74 kg -admit   Estimated Energy Needs: 2822-9017 kcals (25-30 Kcal/Kg)  Justification: maintenance  Estimated Protein Needs:  grams protein (1.2-1.5 g pro/Kg)  Justification: preservation of lean body mass  Estimated Fluid Needs: per MD    Previous Goals:   EN to meet % estimated needs  Evaluation: Met    Previous Nutrition Diagnosis:   No nutrition diagnosis identified at this time   Evaluation: No change    CURRENT NUTRITION DIAGNOSIS  No nutrition diagnosis identified at this  time    INTERVENTIONS  Recommendations / Nutrition Prescription  Continue TF and Flushes as ordered    Implementation  None new    Goals  TF @ goal to provide % estimated needs.       MONITORING AND EVALUATION:  Progress towards goals will be monitored and evaluated per protocol and Practice Guidelines    Marisel Fernández RD, LD  [facility], [facility], [facility]  Pager: [phone]  Weekend Pager: [phone]

## 2022-06-29 NOTE — PROGRESS NOTES
[facility]  Infectious Disease Progress Note          Assessment and Plan:   IMPRESSION:     1.  A 61-year-old male, very well known to us, including recent admission and discharge with strep bacteremia, now admitted with respiratory insufficiency, COVID-19 positive, also possible pneumonia or aspiration as part of diagnosis.  2.  Recent Strep salivarius bacteremia, transesophageal echocardiogram negative, but in the midst of an extended IV course.  3.  History of numerous admissions, frequently as monthly with aspiration, sepsis, often Pseudomonas colonized sputum as cause.  4.  Traumatic brain injury with major chronic neurologic abnormalities.  5.  COVID-19, now being treated.  6.  MRSA (methicillin-resistant Staphylococcus aureus) and VRE (vancomycin-resistant Enterococcus) colonization.     RECOMMENDATIONS:     1. Treat COVID-19 as we are doing.  2  Ongoing discussions with family regarding level of care.  3 Day 13/ 14 of antibiotics    Stop after tomorrow this will also complete the course of previous bacteremia    Will sign off please call with ques        Interval History:   no new complaints looks Ok O2 better T down cxs pending Bc neg              Medications:       acetylcysteine  2 mL Nebulization 4x Daily     aspirin  81 mg Oral or Feeding Tube Daily     baricitinib  4 mg Oral Daily     Brivaracetam  100 mg Oral or Feeding Tube BID     calcium carbonate  1,250 mg Oral or Feeding Tube TID     carBAMazepine  100 mg Oral or Feeding Tube Daily     carBAMazepine  150 mg Oral or Feeding Tube TID     enoxaparin ANTICOAGULANT  40 mg Subcutaneous Q24H     hydrocortisone  15 mg Oral or Feeding Tube Daily     hydrocortisone  5 mg Oral or Feeding Tube Daily at 4 pm     insulin aspart  1-12 Units Subcutaneous Q4H     ipratropium - albuterol 0.5 mg/2.5 mg/3 mL  3 mL Nebulization 4x daily     levothyroxine  150 mcg Per Feeding Tube QAM AC     metoclopramide  10 mg Per Feeding Tube 4x Daily AC &  HS     miconazole   Topical BID     multivitamins w/minerals  15 mL Per J Tube Daily     pantoprazole  40 mg Per J Tube Daily     piperacillin-tazobactam  4.5 g Intravenous Q6H     sodium bicarbonate  650 mg Oral or Feeding Tube BID     vancomycin  1,250 mg Intravenous Q24H                  Physical Exam:   Blood pressure 125/67, pulse 76, temperature 97.4  F (36.3  C), temperature source Axillary, resp. rate 18, height 1.829 m (6'), weight 69 kg (152 lb 1.9 oz), SpO2 95 %.  Wt Readings from Last 2 Encounters:   06/27/22 69 kg (152 lb 1.9 oz)   06/07/22 71 kg (156 lb 8 oz)     Vital Signs with Ranges  Temp:  [97.4  F (36.3  C)-97.5  F (36.4  C)] 97.4  F (36.3  C)  Pulse:  [55-76] 76  Resp:  [14-20] 18  BP: (112-125)/(61-67) 125/67  SpO2:  [95 %-99 %] 95 %    Constitutional: Awake, alert, cooperative, no apparent distress looks like usual self   Lungs: Clear to auscultation bilaterally, no crackles or wheezing   Cardiovascular: Regular rate and rhythm, normal S1 and S2, and no murmur noted   Abdomen: Normal bowel sounds, soft, non-distended, non-tender   Skin: No rashes, no cyanosis, no edema   Other:           Data:   All microbiology laboratory data reviewed.  Recent Labs   Lab Test 06/28/22  0838 06/27/22  1115 06/26/22  0825   WBC 13.7* 11.7* 8.4   HGB 12.4* 11.5* 12.3*   HCT 39.6* 36.4* 38.7*   MCV 93 93 92    204 219     Recent Labs   Lab Test 06/28/22  0838 06/27/22  1115 06/26/22  0825   CR 0.94 0.90 0.81     No lab results found.  Recent Labs   Lab Test 05/23/21  0316 05/23/21  0250 04/15/21  2250 02/22/21  1622 02/22/21  1439 02/22/21  1413 02/19/21  1155 02/19/21  1123 01/15/21  0214   CULT No growth No growth No growth No growth No growth No growth No growth No growth >100,000 colonies/mL  Klebsiella pneumoniae  *

## 2022-06-29 NOTE — PROGRESS NOTES
RN [id number]: Patient alert/follows most commands. Lungs diminished/on RA. Vss, denied pain. Incontinent of both urine (external cath placed) & stool. Turned Q2 hours while in bed, on pulsate mattress/PCD's. Maintained in isolation for Covid/MRSA. Tolerating tube feeding, [facility]'s [id number]; K+ 3.8 Phos 2.6.
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
## 2022-06-30 LAB
ALBUMIN SERPL-MCNC: 2.7 G/DL (ref 3.4–5)
ALP SERPL-CCNC: 145 U/L (ref 40–150)
ALT SERPL W P-5'-P-CCNC: 84 U/L (ref 0–70)
ANION GAP SERPL CALCULATED.3IONS-SCNC: 6 MMOL/L (ref 3–14)
AST SERPL W P-5'-P-CCNC: 75 U/L (ref 0–45)
BILIRUB DIRECT SERPL-MCNC: <0.1 MG/DL (ref 0–0.2)
BILIRUB SERPL-MCNC: 0.5 MG/DL (ref 0.2–1.3)
BUN SERPL-MCNC: 18 MG/DL (ref 7–30)
CALCIUM SERPL-MCNC: 8.4 MG/DL (ref 8.5–10.1)
CHLORIDE BLD-SCNC: 102 MMOL/L (ref 94–109)
CO2 SERPL-SCNC: 24 MMOL/L (ref 20–32)
CREAT SERPL-MCNC: 0.78 MG/DL (ref 0.66–1.25)
ERYTHROCYTE [DISTWIDTH] IN BLOOD BY AUTOMATED COUNT: 14 % (ref 10–15)
GFR SERPL CREATININE-BSD FRML MDRD: >90 ML/MIN/1.73M2
GLUCOSE BLD-MCNC: 80 MG/DL (ref 70–99)
GLUCOSE BLDC GLUCOMTR-MCNC: 116 MG/DL (ref 70–99)
GLUCOSE BLDC GLUCOMTR-MCNC: 133 MG/DL (ref 70–99)
GLUCOSE BLDC GLUCOMTR-MCNC: 147 MG/DL (ref 70–99)
GLUCOSE BLDC GLUCOMTR-MCNC: 163 MG/DL (ref 70–99)
GLUCOSE BLDC GLUCOMTR-MCNC: 170 MG/DL (ref 70–99)
GLUCOSE BLDC GLUCOMTR-MCNC: 79 MG/DL (ref 70–99)
HCT VFR BLD AUTO: 37.1 % (ref 40–53)
HGB BLD-MCNC: 12 G/DL (ref 13.3–17.7)
MCH RBC QN AUTO: 28.8 PG (ref 26.5–33)
MCHC RBC AUTO-ENTMCNC: 32.3 G/DL (ref 31.5–36.5)
MCV RBC AUTO: 89 FL (ref 78–100)
PHOSPHATE SERPL-MCNC: 2.3 MG/DL (ref 2.5–4.5)
PLATELET # BLD AUTO: 247 10E3/UL (ref 150–450)
POTASSIUM BLD-SCNC: 4.1 MMOL/L (ref 3.4–5.3)
PROT SERPL-MCNC: 7.1 G/DL (ref 6.8–8.8)
RBC # BLD AUTO: 4.17 10E6/UL (ref 4.4–5.9)
SODIUM SERPL-SCNC: 132 MMOL/L (ref 133–144)
WBC # BLD AUTO: 9.5 10E3/UL (ref 4–11)

## 2022-06-30 PROCEDURE — 250N000013 HC RX MED GY IP 250 OP 250 PS 637: Performed by: HOSPITALIST

## 2022-06-30 PROCEDURE — 84100 ASSAY OF PHOSPHORUS: CPT | Performed by: HOSPITALIST

## 2022-06-30 PROCEDURE — 999N000157 HC STATISTIC RCP TIME EA 10 MIN

## 2022-06-30 PROCEDURE — 250N000011 HC RX IP 250 OP 636: Performed by: HOSPITALIST

## 2022-06-30 PROCEDURE — 85027 COMPLETE CBC AUTOMATED: CPT | Performed by: HOSPITALIST

## 2022-06-30 PROCEDURE — 258N000003 HC RX IP 258 OP 636: Performed by: HOSPITALIST

## 2022-06-30 PROCEDURE — 36415 COLL VENOUS BLD VENIPUNCTURE: CPT | Performed by: HOSPITALIST

## 2022-06-30 PROCEDURE — 94640 AIRWAY INHALATION TREATMENT: CPT

## 2022-06-30 PROCEDURE — 999N000190 HC STATISTIC VAT ROUNDS

## 2022-06-30 PROCEDURE — 82248 BILIRUBIN DIRECT: CPT | Performed by: HOSPITALIST

## 2022-06-30 PROCEDURE — 99232 SBSQ HOSP IP/OBS MODERATE 35: CPT | Performed by: HOSPITALIST

## 2022-06-30 PROCEDURE — 94640 AIRWAY INHALATION TREATMENT: CPT | Mod: 76

## 2022-06-30 PROCEDURE — 250N000009 HC RX 250: Performed by: HOSPITALIST

## 2022-06-30 PROCEDURE — 82310 ASSAY OF CALCIUM: CPT | Performed by: HOSPITALIST

## 2022-06-30 PROCEDURE — G0463 HOSPITAL OUTPT CLINIC VISIT: HCPCS

## 2022-06-30 PROCEDURE — 120N000001 HC R&B MED SURG/OB

## 2022-06-30 RX ADMIN — PIPERACILLIN SODIUM AND TAZOBACTAM SODIUM 4.5 G: 4; .5 INJECTION, POWDER, LYOPHILIZED, FOR SOLUTION INTRAVENOUS at 04:19

## 2022-06-30 RX ADMIN — METOCLOPRAMIDE HYDROCHLORIDE 10 MG: 5 SOLUTION ORAL at 16:43

## 2022-06-30 RX ADMIN — METOCLOPRAMIDE HYDROCHLORIDE 10 MG: 5 SOLUTION ORAL at 08:53

## 2022-06-30 RX ADMIN — POTASSIUM & SODIUM PHOSPHATES POWDER PACK 280-160-250 MG 1 PACKET: 280-160-250 PACK at 13:43

## 2022-06-30 RX ADMIN — MICONAZOLE NITRATE: 20 POWDER TOPICAL at 20:18

## 2022-06-30 RX ADMIN — POTASSIUM & SODIUM PHOSPHATES POWDER PACK 280-160-250 MG 1 PACKET: 280-160-250 PACK at 10:19

## 2022-06-30 RX ADMIN — IPRATROPIUM BROMIDE AND ALBUTEROL SULFATE 3 ML: .5; 3 SOLUTION RESPIRATORY (INHALATION) at 09:14

## 2022-06-30 RX ADMIN — CALCIUM CARBONATE 1250 MG: 1250 SUSPENSION ORAL at 16:44

## 2022-06-30 RX ADMIN — SODIUM BICARBONATE 650 MG TABLET 650 MG: at 08:53

## 2022-06-30 RX ADMIN — BRIVARACETAM 100 MG: 10 SOLUTION ORAL at 08:53

## 2022-06-30 RX ADMIN — CARBAMAZEPINE 100 MG: 100 SUSPENSION ORAL at 18:29

## 2022-06-30 RX ADMIN — CARBAMAZEPINE 150 MG: 100 SUSPENSION ORAL at 12:18

## 2022-06-30 RX ADMIN — PIPERACILLIN SODIUM AND TAZOBACTAM SODIUM 4.5 G: 4; .5 INJECTION, POWDER, LYOPHILIZED, FOR SOLUTION INTRAVENOUS at 16:41

## 2022-06-30 RX ADMIN — METOCLOPRAMIDE HYDROCHLORIDE 10 MG: 5 SOLUTION ORAL at 20:14

## 2022-06-30 RX ADMIN — ACETYLCYSTEINE 2 ML: 200 INHALANT RESPIRATORY (INHALATION) at 09:22

## 2022-06-30 RX ADMIN — BRIVARACETAM 100 MG: 10 SOLUTION ORAL at 20:14

## 2022-06-30 RX ADMIN — PIPERACILLIN SODIUM AND TAZOBACTAM SODIUM 4.5 G: 4; .5 INJECTION, POWDER, LYOPHILIZED, FOR SOLUTION INTRAVENOUS at 22:22

## 2022-06-30 RX ADMIN — HYDROCORTISONE 15 MG: 10 TABLET ORAL at 08:53

## 2022-06-30 RX ADMIN — CALCIUM CARBONATE 1250 MG: 1250 SUSPENSION ORAL at 08:53

## 2022-06-30 RX ADMIN — CARBAMAZEPINE 150 MG: 100 SUSPENSION ORAL at 00:46

## 2022-06-30 RX ADMIN — POTASSIUM & SODIUM PHOSPHATES POWDER PACK 280-160-250 MG 1 PACKET: 280-160-250 PACK at 18:29

## 2022-06-30 RX ADMIN — IPRATROPIUM BROMIDE AND ALBUTEROL SULFATE 3 ML: .5; 3 SOLUTION RESPIRATORY (INHALATION) at 20:34

## 2022-06-30 RX ADMIN — VANCOMYCIN HYDROCHLORIDE 1250 MG: 5 INJECTION, POWDER, LYOPHILIZED, FOR SOLUTION INTRAVENOUS at 00:44

## 2022-06-30 RX ADMIN — ACETYLCYSTEINE 2 ML: 200 INHALANT RESPIRATORY (INHALATION) at 20:34

## 2022-06-30 RX ADMIN — PIPERACILLIN SODIUM AND TAZOBACTAM SODIUM 4.5 G: 4; .5 INJECTION, POWDER, LYOPHILIZED, FOR SOLUTION INTRAVENOUS at 10:19

## 2022-06-30 RX ADMIN — ASPIRIN 81 MG CHEWABLE TABLET 81 MG: 81 TABLET CHEWABLE at 08:53

## 2022-06-30 RX ADMIN — ENOXAPARIN SODIUM 40 MG: 40 INJECTION SUBCUTANEOUS at 10:19

## 2022-06-30 RX ADMIN — LEVOTHYROXINE SODIUM 150 MCG: 150 TABLET ORAL at 06:45

## 2022-06-30 RX ADMIN — Medication 15 ML: at 16:42

## 2022-06-30 RX ADMIN — HYDROCORTISONE 5 MG: 5 TABLET ORAL at 16:43

## 2022-06-30 RX ADMIN — SODIUM BICARBONATE 650 MG TABLET 650 MG: at 20:14

## 2022-06-30 RX ADMIN — CALCIUM CARBONATE 1250 MG: 1250 SUSPENSION ORAL at 22:22

## 2022-06-30 RX ADMIN — Medication 40 MG: at 08:53

## 2022-06-30 RX ADMIN — BARICITINIB 4 MG: 2 TABLET, FILM COATED ORAL at 16:44

## 2022-06-30 RX ADMIN — CARBAMAZEPINE 150 MG: 100 SUSPENSION ORAL at 07:01

## 2022-06-30 RX ADMIN — MICONAZOLE NITRATE: 20 POWDER TOPICAL at 09:00

## 2022-06-30 RX ADMIN — METOCLOPRAMIDE HYDROCHLORIDE 10 MG: 5 SOLUTION ORAL at 12:18

## 2022-06-30 ASSESSMENT — ACTIVITIES OF DAILY LIVING (ADL)
ADLS_ACUITY_SCORE: 65
ADLS_ACUITY_SCORE: 59
ADLS_ACUITY_SCORE: 65
ADLS_ACUITY_SCORE: 59
ADLS_ACUITY_SCORE: 65
ADLS_ACUITY_SCORE: 59
ADLS_ACUITY_SCORE: 65
ADLS_ACUITY_SCORE: 59
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
## 2022-06-30 NOTE — PROGRESS NOTES
RN [id number]: Patient alert/HECTOR orientation but can give one word answers at times. Lungs clear on RA, vss, denied pain. Repositioned Q2 hours, tube feeding infusing @60cc/hr with 100cc fluid flushes Q4. Incontinent of bowel X1, external cath in place. Discharging tomorrow.

## 2022-06-30 NOTE — PROGRESS NOTES
The pt  has received all his nebs per MD order.    Device: ROOM AIR  Breath sounds: diminished     Skin assessment: Intact    /67 (BP Location: Right arm)   Pulse 58   Temp 97.7  F (36.5  C) (Axillary)   Resp 18   Ht 1.829 m (6')   Wt 69 kg (152 lb 1.9 oz)   SpO2 95%   BMI 20.63 kg/m      RT will continue to treat and monitor.       Rabia Medina, RT on 6/29/2022 at 9:59 PM

## 2022-06-30 NOTE — PROGRESS NOTES
Care Management Follow Up    Length of Stay (days): 13    Expected Discharge Date: 07/01/2022     Concerns to be Addressed: care coordination/care conferences, adjustment to diagnosis/illness     Patient plan of care discussed at interdisciplinary rounds: Yes    Anticipated Discharge Disposition: Home     Anticipated Discharge Services: PCA, resume [facility]  Anticipated Discharge DME: NA     Patient/family educated on Medicare website which has current facility and service quality ratings:  NA  Education Provided on the Discharge Plan:  yes  Patient/Family in Agreement with the Plan: yes    Referrals Placed by CM/SW: YVONNE   Private pay costs discussed: Not applicable    Additional Information:  Patient will discharge home tomorrow AM. Transportation has been arranged thru [email or web address] via stretcher at 10:30 AM.  His Mother that is also his Guardian, was updated and in agreement.  Will need the Dietician to help with the TF orders on his discharge.   [facility] provides Nsg services and is aware that pt will discharge tomorrow.  PCP follow-up is scheduled virtually on 7/5/22.      Karen Collins RN   Inpatient Care Management  [phone]

## 2022-06-30 NOTE — PROGRESS NOTES
[facility] Nurse Inpatient Assessment     Consulted for: Coccyx     Summary: Pt is total care and has long history of intermittent skin breakdown and rash to coccyx and groin area.  Coccyx/ upper buttock tissue was fragile with diffuse blanchable erythema on admission, now much improved.  Groin rash resolved.  Pulsate mattress in place.  Continue current POC.     Patient History (according to provider note(s):      Keyon Farias is a 61 year old male admitted on 6/16/2022. He presents to the emergency department with fever.  History of frequent aspiration pneumonia and aspiration pneumonitis events as well as recent strep bacteremia thought secondary to oral jaime resulting in septic shock and prolonged hospitalization with discharge 6/8/2022.  Found now to have a new diagnosis of COVID-19, though fortunately no hypoxia or other associated symptoms.    Areas Assessed:      Areas visualized during today's visit: Sacrum/coccyx and groin    Wound Location: Right inner buttock    Last photo: 6-30-22 6-23-22 6-17-22      Wound due to: pressure vs other  Wound history/plan of care: pt has long hx of occasional breakdown and rash, usually related to IAD (incontinence-associated dermatitis); pt total cares and needs assist x 2 for repositioning.    Wound base: intact; previous red/maroon non-blanching tissue along inner upper right buttock is now much lighter and blanchable        Palpation of the wound bed: normal     Drainage: none     Description of drainage: none     Measurements (length x width x depth, in cm): roughly 3 x 1 x 0cm area       Tunneling: N/A     Undermining: N/A  Periwound skin: Intact and Erythema- blanchable; prior IAD healed      Color: pink      Temperature: normal   Odor: none  Pain: no grimacing or signs of discomfort    Pain interventions prior to dressing change: patient tolerated well  Treatment goal: Heal , Decrease moisture and Protection  STATUS:  improving  Supplies reviewed        Skin Injury Location: Groin    Last photo: 6-17-22 (no new pic 6/30 but appears resolved)      Skin injury due to: Fungal rash , Incontinence associated dermatitis (IAD) and possible follicle irritation/ pustules to right upper groin now resolved  Skin history and plan of care: incontinent of urine and stool, has primofit in use with varying success per nursing  Affected area: bilateral inner groin     Skin assessment: Intact     Measurements (length x width x depth, in cm) n/a     Color: pink     Temperature  normal   Pain: no grimacing or signs of discomfort    Treatment goal: Decrease moisture, Maintain (prevention of deterioration) and Protection  STATUS: resolved  Supplies reviewed      Treatment Plan:     Perineal cares: BID and prn:  1.  Cleanse with Rita perineal lotion and soft dry wipes.  (Please use this combo instead of the blue bag wipes).   2.  Apply antifungal powder to groin folds and gluteal cleft, rub in well.  3.  Apply thin glaze of barrier paste to perianal area, gluteal cleft, inner buttocks prn if loose stools.  4.  Leave coccyx open to air if frequently incontinent.  Minimize use of briefs, if able to contain urine with other devices.   5.  PIP measures; Pulsate mattress.  Only use lift sheet and chux on air mattress - no draw sheet needed.     Pressure Injury Prevention (PIP) Plan:      Moisture management: Perineal cleansing /protection: Follow Incontinence Protocol and Clean and dry skin folds with bathing       Mattress: low air loss recommended      HOB: Maintain at or below 30 degrees, unless contraindicated      Repositioning in bed: Every 1-2 hours , Left/right positioning; avoid supine and Raise foot of bed prior to raising head of bed, to reduce patient sliding down (shear)      Heels: Keep elevated off mattress and Pillows under calves      Protective dressing: None d/t frequent incontinence      Under devices: Inspect skin under all medical  devices during skin inspection , Ensure tubes are stabilized without tension and Ensure patient is not lying on medical devices or equipment when repositioned    Orders: Reviewed and Updated    RECOMMEND PRIMARY TEAM ORDER: None, at this time  Education provided: importance of repositioning, plan of care, Moisture management, Hygiene and Off-loading pressure  Discussed plan of care with: Patient and Nurse  WOC nurse follow-up plan: weekly and prn; will continue to follow pt d/t risk for PI  Notify WOC if wound(s) deteriorate.  Nursing to notify the Provider(s) and re-consult the WOC Nurse if new skin concern.    DATA:     Current support surface: cart in ED  Containment of urine/stool: Incontinence Protocol; PrimoFit  BMI: Body mass index is 20.63 kg/m .   Active diet order: Orders Placed This Encounter      NPO for Medical/Clinical Reasons Except for: No Exceptions     Output: I/O last 3 completed shifts:  In: -   Out: 2300 [Urine:1500; Emesis/NG output:800]     Labs:   Recent Labs   Lab 06/30/22  0747   ALBUMIN 2.7*   HGB 12.0*   WBC 9.5     Pressure injury risk assessment:   Sensory Perception: 3-->slightly limited  Moisture: 3-->occasionally moist  Activity: 1-->bedfast  Mobility: 2-->very limited  Nutrition: 3-->adequate  Friction and Shear: 2-->potential problem  Ricci Score: 14    Kristie Norris RN   Dept. Pager: [phone]  Dept. Office Number: [phone]

## 2022-07-01 ENCOUNTER — APPOINTMENT (OUTPATIENT)
Dept: INTERVENTIONAL RADIOLOGY/VASCULAR | Facility: CLINIC | Age: 62
DRG: 177 | End: 2022-07-01
Attending: HOSPITALIST
Payer: MEDICARE

## 2022-07-01 VITALS
RESPIRATION RATE: 18 BRPM | OXYGEN SATURATION: 98 % | HEIGHT: 72 IN | TEMPERATURE: 97.8 F | DIASTOLIC BLOOD PRESSURE: 65 MMHG | SYSTOLIC BLOOD PRESSURE: 108 MMHG | WEIGHT: 152.12 LBS | BODY MASS INDEX: 20.6 KG/M2 | HEART RATE: 80 BPM

## 2022-07-01 LAB
GLUCOSE BLDC GLUCOMTR-MCNC: 113 MG/DL (ref 70–99)
GLUCOSE BLDC GLUCOMTR-MCNC: 133 MG/DL (ref 70–99)
GLUCOSE BLDC GLUCOMTR-MCNC: 140 MG/DL (ref 70–99)

## 2022-07-01 PROCEDURE — 49452 REPLACE G-J TUBE PERC: CPT

## 2022-07-01 PROCEDURE — 999N000040 HC STATISTIC CONSULT NO CHARGE VASC ACCESS

## 2022-07-01 PROCEDURE — 250N000013 HC RX MED GY IP 250 OP 250 PS 637: Performed by: HOSPITALIST

## 2022-07-01 PROCEDURE — 999N000157 HC STATISTIC RCP TIME EA 10 MIN

## 2022-07-01 PROCEDURE — 94640 AIRWAY INHALATION TREATMENT: CPT

## 2022-07-01 PROCEDURE — 0D2DXUZ CHANGE FEEDING DEVICE IN LOWER INTESTINAL TRACT, EXTERNAL APPROACH: ICD-10-PCS | Performed by: RADIOLOGY

## 2022-07-01 PROCEDURE — 250N000011 HC RX IP 250 OP 636: Performed by: HOSPITALIST

## 2022-07-01 PROCEDURE — 99239 HOSP IP/OBS DSCHRG MGMT >30: CPT | Performed by: HOSPITALIST

## 2022-07-01 PROCEDURE — 250N000009 HC RX 250: Performed by: HOSPITALIST

## 2022-07-01 RX ORDER — ASPIRIN 81 MG/1
81 TABLET, CHEWABLE ORAL DAILY
Start: 2022-07-15 | End: 2022-07-05

## 2022-07-01 RX ADMIN — SODIUM BICARBONATE 650 MG TABLET 650 MG: at 08:02

## 2022-07-01 RX ADMIN — LEVOTHYROXINE SODIUM 150 MCG: 150 TABLET ORAL at 06:59

## 2022-07-01 RX ADMIN — METOCLOPRAMIDE HYDROCHLORIDE 10 MG: 5 SOLUTION ORAL at 11:40

## 2022-07-01 RX ADMIN — CARBAMAZEPINE 150 MG: 100 SUSPENSION ORAL at 00:40

## 2022-07-01 RX ADMIN — HYDROCORTISONE 15 MG: 10 TABLET ORAL at 08:02

## 2022-07-01 RX ADMIN — ASPIRIN 81 MG CHEWABLE TABLET 81 MG: 81 TABLET CHEWABLE at 08:02

## 2022-07-01 RX ADMIN — BRIVARACETAM 100 MG: 10 SOLUTION ORAL at 08:03

## 2022-07-01 RX ADMIN — MICONAZOLE NITRATE: 20 POWDER TOPICAL at 08:19

## 2022-07-01 RX ADMIN — ACETYLCYSTEINE 2 ML: 200 INHALANT RESPIRATORY (INHALATION) at 07:42

## 2022-07-01 RX ADMIN — IPRATROPIUM BROMIDE AND ALBUTEROL SULFATE 3 ML: .5; 3 SOLUTION RESPIRATORY (INHALATION) at 07:42

## 2022-07-01 RX ADMIN — METOCLOPRAMIDE HYDROCHLORIDE 10 MG: 5 SOLUTION ORAL at 08:03

## 2022-07-01 RX ADMIN — CARBAMAZEPINE 150 MG: 100 SUSPENSION ORAL at 06:59

## 2022-07-01 RX ADMIN — CALCIUM CARBONATE 1250 MG: 1250 SUSPENSION ORAL at 08:04

## 2022-07-01 RX ADMIN — CARBAMAZEPINE 150 MG: 100 SUSPENSION ORAL at 11:40

## 2022-07-01 RX ADMIN — ENOXAPARIN SODIUM 40 MG: 40 INJECTION SUBCUTANEOUS at 11:40

## 2022-07-01 RX ADMIN — Medication 40 MG: at 08:03

## 2022-07-01 ASSESSMENT — ACTIVITIES OF DAILY LIVING (ADL)
ADLS_ACUITY_SCORE: 66
ADLS_ACUITY_SCORE: 66
ADLS_ACUITY_SCORE: 70
ADLS_ACUITY_SCORE: 70
ADLS_ACUITY_SCORE: 69
ADLS_ACUITY_SCORE: 70

## 2022-07-01 NOTE — IR NOTE
Interventional Radiology Intra-procedural Nursing Note    Patient Name: Keyon Farias  Medical Record Number: [id number]  Today's Date: July 1, 2022    Procedure: GJ tube exchange  Start time: 0931  End time: 0933  Report provided to: Regina CHRISTINE  Patient depart time and location: 0938 to 6600    Note: Patient entered Interventional Radiology Suite number 2 via cart. Patient awake, alert and orientated. Assisted onto procedural table in supine position. Prepped and draped.  Dr. Negron in room. Time out and procedure started.     Procedure well tolerated by patient without complications. Procedure end with debrief by Dr. Negron.

## 2022-07-01 NOTE — DISCHARGE INSTRUCTIONS
Tube Feeding:  Run Jevity 1.5 via pump @ goal 60 mL/hr. Hold TF for 1 hour before and after taking your synthroid medication.   Flush tube with 160 mL water every 4 hours.   Total Enteral Provisions: Jevity 1.5 @ goal 60 mL/hr x 22 hours = 1980 kcal (27 kcal/kg), 84 g protein (1.2 g/kg), 28 g fiber, 1963 mL H2O

## 2022-07-01 NOTE — PROGRESS NOTES
.Discharge    Patient discharged to home via stretcher with transportation  Care plan note  Completed     Listed belongings gathered and given to patient (including from security/pharmacy). Yes  Care Plan and Patient education resolved: Yes  Prescriptions if needed, hard copies sent with patient  Yes  Medication Bin checked and emptied on discharge Yes  SW/care coordinator/charge RN aware of discharge: Yes
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
## 2022-07-01 NOTE — PROGRESS NOTES
CLINICAL NUTRITION SERVICES - BRIEF NOTE    Noted patient discharging home this morning. The following discharge instructions were placed --    Tube Feeding:  Run Jevity 1.5 via pump @ goal 60 mL/hr. Hold TF for 1 hour before and after taking your synthroid medication.   Flush tube with 160 mL water every 4 hours.   Total Enteral Provisions: Jevity 1.5 @ goal 60 mL/hr x 22 hours = 1980 kcal (27 kcal/kg), 84 g protein (1.2 g/kg), 28 g fiber, 1963 mL H2O    Please page with any questions or needs prior to discharge.     Anastasiya Power RD, LD  Pager: [phone]

## 2022-07-02 ENCOUNTER — NURSE TRIAGE (OUTPATIENT)
Dept: NURSING | Facility: CLINIC | Age: 62
End: 2022-07-02

## 2022-07-02 DIAGNOSIS — Z71.89 OTHER SPECIFIED COUNSELING: ICD-10-CM

## 2022-07-02 NOTE — TELEPHONE ENCOUNTER
The Home Care/Assisted Living/Nursing Facility is calling regarding an established patient.  Has the patient seen Home Care in the past or is currently residing in Assisted Living or Nursing Facility? Yes.     NANCI Madrid calling from [facility] requesting the following orders that are within the Home Care, Assisted Living or Nursing Home Eval and Treatment standing order and can be signed as standing order signature required by RN.    Preferred Call Back Number: [phone]    Home Care Visits Continuation   Requesting   2 nurse visit next week  PT eval    Any additional Orders:  Are there any orders requested, not stated above, that are outside of the standing order and must be routed to a licensed practitioner for approval?    No    Writer has verified Requestor will send fax to have orders signed.    Chayo Diaz RN  Triage Nurse Advisor    Additional Information    [1] Other NON-URGENT information for PCP AND [2] does not require PCP response    Protocols used: PCP CALL - NO TRIAGE-[facility]

## 2022-07-04 ENCOUNTER — HOSPITAL ENCOUNTER (EMERGENCY)
Facility: CLINIC | Age: 62
Discharge: HOME | End: 2022-07-05
Attending: EMERGENCY MEDICINE | Admitting: EMERGENCY MEDICINE
Payer: MEDICARE

## 2022-07-04 DIAGNOSIS — E87.1 HYPONATREMIA: ICD-10-CM

## 2022-07-04 DIAGNOSIS — D64.9 ANEMIA, UNSPECIFIED TYPE: ICD-10-CM

## 2022-07-04 DIAGNOSIS — K92.1 MELENA: ICD-10-CM

## 2022-07-04 PROCEDURE — 99284 EMERGENCY DEPT VISIT MOD MDM: CPT | Mod: 25

## 2022-07-04 PROCEDURE — 96374 THER/PROPH/DIAG INJ IV PUSH: CPT

## 2022-07-05 ENCOUNTER — PATIENT OUTREACH (OUTPATIENT)
Dept: CARE COORDINATION | Facility: CLINIC | Age: 62
End: 2022-07-05

## 2022-07-05 ENCOUNTER — VIRTUAL VISIT (OUTPATIENT)
Dept: FAMILY MEDICINE | Facility: CLINIC | Age: 62
End: 2022-07-05

## 2022-07-05 VITALS
RESPIRATION RATE: 17 BRPM | OXYGEN SATURATION: 99 % | SYSTOLIC BLOOD PRESSURE: 106 MMHG | TEMPERATURE: 96.5 F | DIASTOLIC BLOOD PRESSURE: 66 MMHG | HEART RATE: 79 BPM

## 2022-07-05 DIAGNOSIS — K92.1 BLACK TARRY STOOLS: Primary | ICD-10-CM

## 2022-07-05 LAB
ABO/RH(D): NORMAL
ANION GAP SERPL CALCULATED.3IONS-SCNC: 4 MMOL/L (ref 3–14)
ANTIBODY SCREEN: NEGATIVE
BASOPHILS # BLD AUTO: 0.1 10E3/UL (ref 0–0.2)
BASOPHILS NFR BLD AUTO: 1 %
BUN SERPL-MCNC: 22 MG/DL (ref 7–30)
CALCIUM SERPL-MCNC: 8.8 MG/DL (ref 8.5–10.1)
CHLORIDE BLD-SCNC: 98 MMOL/L (ref 94–109)
CO2 SERPL-SCNC: 30 MMOL/L (ref 20–32)
CREAT SERPL-MCNC: 0.78 MG/DL (ref 0.66–1.25)
EOSINOPHIL # BLD AUTO: 0.2 10E3/UL (ref 0–0.7)
EOSINOPHIL NFR BLD AUTO: 3 %
ERYTHROCYTE [DISTWIDTH] IN BLOOD BY AUTOMATED COUNT: 14.2 % (ref 10–15)
GFR SERPL CREATININE-BSD FRML MDRD: >90 ML/MIN/1.73M2
GLUCOSE BLD-MCNC: 102 MG/DL (ref 70–99)
HCT VFR BLD AUTO: 36.9 % (ref 40–53)
HEMOCCULT STL QL: POSITIVE
HGB BLD-MCNC: 12.2 G/DL (ref 13.3–17.7)
IMM GRANULOCYTES # BLD: 0.1 10E3/UL
IMM GRANULOCYTES NFR BLD: 1 %
INR PPP: 1.15 (ref 0.85–1.15)
LYMPHOCYTES # BLD AUTO: 2.3 10E3/UL (ref 0.8–5.3)
LYMPHOCYTES NFR BLD AUTO: 28 %
MCH RBC QN AUTO: 29.4 PG (ref 26.5–33)
MCHC RBC AUTO-ENTMCNC: 33.1 G/DL (ref 31.5–36.5)
MCV RBC AUTO: 89 FL (ref 78–100)
MONOCYTES # BLD AUTO: 1 10E3/UL (ref 0–1.3)
MONOCYTES NFR BLD AUTO: 12 %
NEUTROPHILS # BLD AUTO: 4.7 10E3/UL (ref 1.6–8.3)
NEUTROPHILS NFR BLD AUTO: 55 %
NRBC # BLD AUTO: 0 10E3/UL
NRBC BLD AUTO-RTO: 0 /100
PLATELET # BLD AUTO: 264 10E3/UL (ref 150–450)
POTASSIUM BLD-SCNC: 4.6 MMOL/L (ref 3.4–5.3)
RBC # BLD AUTO: 4.15 10E6/UL (ref 4.4–5.9)
SODIUM SERPL-SCNC: 132 MMOL/L (ref 133–144)
SPECIMEN EXPIRATION DATE: NORMAL
WBC # BLD AUTO: 8.4 10E3/UL (ref 4–11)

## 2022-07-05 PROCEDURE — 96374 THER/PROPH/DIAG INJ IV PUSH: CPT

## 2022-07-05 PROCEDURE — 82272 OCCULT BLD FECES 1-3 TESTS: CPT | Performed by: EMERGENCY MEDICINE

## 2022-07-05 PROCEDURE — 250N000009 HC RX 250

## 2022-07-05 PROCEDURE — 36415 COLL VENOUS BLD VENIPUNCTURE: CPT | Performed by: EMERGENCY MEDICINE

## 2022-07-05 PROCEDURE — 85610 PROTHROMBIN TIME: CPT | Performed by: EMERGENCY MEDICINE

## 2022-07-05 PROCEDURE — 99214 OFFICE O/P EST MOD 30 MIN: CPT | Mod: 95 | Performed by: INTERNAL MEDICINE

## 2022-07-05 PROCEDURE — 85025 COMPLETE CBC W/AUTO DIFF WBC: CPT | Performed by: EMERGENCY MEDICINE

## 2022-07-05 PROCEDURE — 86850 RBC ANTIBODY SCREEN: CPT | Performed by: EMERGENCY MEDICINE

## 2022-07-05 PROCEDURE — C9113 INJ PANTOPRAZOLE SODIUM, VIA: HCPCS | Performed by: EMERGENCY MEDICINE

## 2022-07-05 PROCEDURE — 80048 BASIC METABOLIC PNL TOTAL CA: CPT | Performed by: EMERGENCY MEDICINE

## 2022-07-05 PROCEDURE — 250N000011 HC RX IP 250 OP 636: Performed by: EMERGENCY MEDICINE

## 2022-07-05 RX ORDER — WATER 10 ML/10ML
INJECTION INTRAMUSCULAR; INTRAVENOUS; SUBCUTANEOUS
Status: COMPLETED
Start: 2022-07-05 | End: 2022-07-05

## 2022-07-05 RX ADMIN — PANTOPRAZOLE SODIUM 40 MG: 40 INJECTION, POWDER, FOR SOLUTION INTRAVENOUS at 00:41

## 2022-07-05 RX ADMIN — WATER 10 ML: 1 INJECTION INTRAMUSCULAR; INTRAVENOUS; SUBCUTANEOUS at 00:41

## 2022-07-05 NOTE — PROGRESS NOTES
[facility]    Background: Care Coordination referral placed from [facility] discharge report for reason of patient meeting criteria for a TCM outreach call by [facility] [place] team.    Assessment: Upon chart review, CCRC Team member will cancel/close the referral for TCM outreach due to reason below:    Patient has a follow up appointment with an appropriate provider today for hospital discharge    Plan: Care Coordination referral for TCM outreach canceled.      Danyelle Pierson  Community Health Worker  [facility] [place] [facility]  Ph: [phone]

## 2022-07-05 NOTE — ED PROVIDER NOTES
History   Chief Complaint:  Melena       The history is provided by the EMS personnel and the patient. The history is limited by the condition of the patient.      Keyon Farias is a 61 year old male with history of TBI, necrotizing pancreatitis, and encephalopathy who presents after starting to have black colored stool this morning. The patient has recently started taking Eliquis. The patient denies that he is experiencing pain, fever, shortness of breath, or vomiting. As he presents to the ED, he is responsive and alert.    Mother reports only 1 dark stool at home.     Review of Systems   Unable to perform ROS: Patient nonverbal     Allergies:  Phenytoin  Valproic Acid  Dilantin [Phenytoin Sodium]  Scopolamine    Medications:  Acetylcysteine neb solution  Albuterol neb solution  Apixaban ANTICOAGULANT  Aspirin 81 mg  Brivaracetam solution  Calcium carbonate suspension  Carbamazepine  Guaifenesin solution  Hydrocortisone  Levothyroxine  Metoclopramide solution  Multivitamin  Pantoprazole suspension  Potassium & sodium phosphates  Scopolamine HBr POWD  Sodium bicarbonate  Testosterone cypionate  Vitamin C  Vitamin D3  Saccharomyces boulardii   Glycopyrrolate  Amoxicillin-pot clavulanate     Past Medical History:     Appendicitis  Panhypopituitarism  Hematemesis  Recurrent seizures  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  Hyponatremia  Pneumonia  Cardiac arrest  Acute respiratory failure with hypoxia  Necrotizing pancreatitis  Encephalopathy  Acid reflux  Aspiration pneumonitis  Transient alteration of awareness  Conjunctivitis of right eye  TBI  Dysphagia related to TBI  Severe sepsis  Pancreatitis  Thrombocytopenia  Hyperlipidemia    Past Surgical History:    Appendectomy  Dental extraction  Contracture release x2  Endoscopic ultrasound upper GI tract  EGDx4  Head and neck surgery  Many IR gastro jejunostomy tube changes  Right hand repair  Tracheostomy  Vascular surgery    Family  History:    Father: kidney cancer, hypertension  Mother: PE    Social History:  The patient presents to the ED alone. He arrived to the ED via EMS services.    Physical Exam     Patient Vitals for the past 24 hrs:   BP Temp Temp src Pulse Resp SpO2   07/05/22 0211 -- (!) 96.5  F (35.8  C) Temporal -- -- --   07/05/22 0206 -- -- -- 79 17 99 %   07/05/22 0140 106/66 -- -- 77 11 --   07/05/22 0139 -- -- -- 80 9 --       Physical Exam  General: Sitting up in bed  Eyes:  The pupils are equal and round    Conjunctivae and sclerae are normal  ENT:    Wearing a mask  Neck:  Normal range of motion  CV:  Regular rate, regular rhythm     Skin warm and well perfused   Resp:  Non labored breathing on room air    No tachypnea    No cough heard    Lungs clear bilaterally  GI:  Abdomen is soft, there is no rigidity    No distension    No rebound tenderness     No abdominal tenderness  Rectal: Dark stool on digital exam. Nurse in room during exam  MS:  Normal muscular tone  Skin:  No rash or acute skin lesions noted  Neuro:   Awake, alert.      Speech is normal and fluent.    Face is symmetric.     Moves all extremities equally  Psych: Normal affect.  Appropriate interactions.    Emergency Department Course     Laboratory:  Labs Ordered and Resulted from Time of ED Arrival to Time of ED Departure   BASIC METABOLIC PANEL - Abnormal       Result Value    Sodium 132 (*)     Potassium 4.6      Chloride 98      Carbon Dioxide (CO2) 30      Anion Gap 4      Urea Nitrogen 22      Creatinine 0.78      Calcium 8.8      Glucose 102 (*)     GFR Estimate >90     OCCULT BLOOD STOOL - Abnormal    Occult Blood Positive (*)    CBC WITH PLATELETS AND DIFFERENTIAL - Abnormal    WBC Count 8.4      RBC Count 4.15 (*)     Hemoglobin 12.2 (*)     Hematocrit 36.9 (*)     MCV 89      MCH 29.4      MCHC 33.1      RDW 14.2      Platelet Count 264      % Neutrophils 55      % Lymphocytes 28      % Monocytes 12      % Eosinophils 3      % Basophils 1      %  Immature Granulocytes 1      NRBCs per 100 WBC 0      Absolute Neutrophils 4.7      Absolute Lymphocytes 2.3      Absolute Monocytes 1.0      Absolute Eosinophils 0.2      Absolute Basophils 0.1      Absolute Immature Granulocytes 0.1      Absolute NRBCs 0.0     INR - Normal    INR 1.15     TYPE AND SCREEN, ADULT    ABO/RH(D) A POS      Antibody Screen Negative      SPECIMEN EXPIRATION DATE [id number]     ABO/RH TYPE AND SCREEN     Emergency Department Course:     Reviewed:  I reviewed nursing notes, vitals, past medical history and Care Everywhere    Assessments:  2350 I obtained history and examined the patient as noted above.   0209 I rechecked the patient and explained findings.     Consults:  0204 I spoke with the patient's mom regarding his presentation, workup here in the ED, and plan of care.    Interventions:  0041 Protonix 40 mg IV  0041 Sterile water 10 mL    Disposition:  The patient was discharged to home.     Impression & Plan     Medical Decision Making:  Keyon Farias is a 61-year-old male who presented to the emergency department with melena.  Patient had 1 dark stool at home.  He is not having any complaints here in the emergency department.  He has no abdominal tenderness on exam.  He was digital rectal exam showed dark brown stool.  No bowel movements here in the emergency department.  His Hemoccult was positive.  His hemoglobin is stable from discharge from the hospital a few days ago.  He is hemodynamically stable.  He was put on Eliquis after last hospitalization because of his recent COVID infection.  He is supposed to take this for 2 weeks.  I think it would be very reasonable to stop this medication given his new symptoms.  I discussed this with his mother and she was in agreement with this.  He is already on a PPI at home.  She will hold his aspirin.  I recommended follow-up with primary care provider for discussion on when to start the aspirin again.  Discussed GI follow-up if  continuing to have dark stools.  I discussed the reasons to return to the emergency department.  Patient was transferred back home by stretcher    Diagnosis:    ICD-10-CM    1. Melena  K92.1    2. Hyponatremia  E87.1    3. Anemia, unspecified type  D64.9      Scribe Disclosure:  I, Clemente Monet, am serving as a scribe at 11:58 PM on 7/4/2022 to document services personally performed by Tamara Thomas MD based on my observations and the provider's statements to me.        Tamara Thomas MD  07/05/22 0543

## 2022-07-05 NOTE — DISCHARGE INSTRUCTIONS
Watch for stool turning to blood, significant increase in black stools  Stop the eliquis  Hold the aspirin till dark stools resolved - discuss with primary care provider when you can resume  Continue the pantoprazole  May need endoscopy with GI if continuing to have black stools. [facility] and [facility] are GI specialists

## 2022-07-05 NOTE — ED TRIAGE NOTES
Pt present via EMS, per EMS pt presents from home mother reports dark tarry stool. Pt recently discharge from hospital

## 2022-07-05 NOTE — PROGRESS NOTES
Keyon is a 61 year old who is being evaluated via a billable video visit.      How would you like to obtain your AVS? MyChart  If the video visit is dropped, the invitation should be resent by: Text to cell phone: [phone]  Will anyone else be joining your video visit? No      Assessment & Plan     Black tarry stools  Stop eliquis  Stop ASA - no hx of CAD or MI. NSTEMI in hospital stay due to septic shock. Plan to stay off ASA.  Refer to GI for consult Virtual and discuss about endoscopy.   - Adult GI  Referral - Consult Only; Future     See Patient Instructions    Return in about 6 months (around 1/5/2023) for Follow up, Routine preventive, with me.    JACOB BERGERON MD  [facility]    Subjective   Keyon is a 61 year old, presenting for the following health issues:  No chief complaint on file.    HPI   Keyon is a 61 year old gentleman who had a video visit today.     His mother is the primary care giver.   He is aphasic and has a hx of TBI.  He was recently hospitalized for COVID pneumonia.   He was started on eliquis and discharged home.   Yesterday he developed black tarry stools and his mother stopped the eliquis. He was taken to the ER and was also asked to stop ASA and metoclopramide.   Reviewed his medication list.       Hospital Follow-up Visit:    Hospital/Nursing Home/IP Rehab Facility: [facility]  Date of Admission: 06/16/22  Date of Discharge: 07/01/22  Reason(s) for Admission:   Sepsis and acute hypoxic respiratory failure due to COVID-19 pneumonia  Suspected MRSA bacterial pneumonia   Acute infectious encephalopathy  Septic shock  Recent strep salivarius bacteremia   Coccygeal pressure injury  Hx TBI with spastic hemiplegia  Dysphagia s/p PEG tube  Panhypopituitarism  Epilepsy   Hx recurrent aspiration  Hyponatremia  Hypokalemia   GERD  Chronic anemia   Hypokalemia   Hypophosphatemia   Hyperglycemia due to steroid use   Thrombocytopenia   Suspected  ischemic liver injury     Was your hospitalization related to COVID-19? No   Problems taking medications regularly:  None  Medication changes since discharge: None  Problems adhering to non-medication therapy:  None    Summary of hospitalization:  [facility] discharge summary reviewed  Diagnostic Tests/Treatments reviewed.  Follow up needed: none  Other Healthcare Providers Involved in Patient s Care:         None  Update since discharge: improved.       Post Discharge Medication Reconciliation: discharge medications reconciled, continue medications without change.  Plan of care communicated with patient           Review of Systems       Objective       Vitals:  No vitals were obtained today due to virtual visit.    Physical Exam         Video-Visit Details    Video Start Time: 3:12 pm    Type of service:  Video Visit    Video End Time:3:39 pm    Originating Location (pt. Location): Home    Distant Location (provider location):  [facility]     Platform used for Video Visit: [employer]    .  ..

## 2022-07-05 NOTE — PATIENT INSTRUCTIONS
[facility] will call you to coordinate your care as prescribed by the provider.  If you don t hear from a representative within 2 business days, please call [phone].

## 2022-07-11 ENCOUNTER — MEDICAL CORRESPONDENCE (OUTPATIENT)
Dept: HEALTH INFORMATION MANAGEMENT | Facility: CLINIC | Age: 62
End: 2022-07-11

## 2022-07-11 ENCOUNTER — TELEPHONE (OUTPATIENT)
Dept: GASTROENTEROLOGY | Facility: CLINIC | Age: 62
End: 2022-07-11

## 2022-07-11 NOTE — TELEPHONE ENCOUNTER
[facility] Call Center    Phone Message    May a detailed message be left on voicemail: yes     Reason for Call: Appointment Intake    Referring Provider Name: Elsa Queen MD  Diagnosis and/or Symptoms: Black tarry stools [K92.1]    Per triage notes patient is to be seen urgently and is currently scheduled on 10/05 which is outside requested time frame. Please review per scheduling guidelines. Thanks!     Action Taken: Message routed to:  [facility] GI    Travel Screening: Not Applicable

## 2022-07-12 ENCOUNTER — TELEPHONE (OUTPATIENT)
Dept: FAMILY MEDICINE | Facility: CLINIC | Age: 62
End: 2022-07-12

## 2022-07-12 DIAGNOSIS — L89.90 PRESSURE INJURY OF SKIN, UNSPECIFIED INJURY STAGE, UNSPECIFIED LOCATION: ICD-10-CM

## 2022-07-12 DIAGNOSIS — G82.20 PARAPLEGIA (H): Primary | ICD-10-CM

## 2022-07-12 NOTE — TELEPHONE ENCOUNTER
Giovanny CHRISTINE called from [facility] requesting PT eval and treat and SN 1x/wk x 1 wk and 1x/every other week for 8 weeks for respiratory and GI issues. Verbal okay given. He will fax orders for provider signature.

## 2022-07-12 NOTE — TELEPHONE ENCOUNTER
The Home Care/Assisted Living/Nursing Facility is calling regarding an established patient.  Has the patient seen Home Care in the past or is currently residing in Assisted Living or Nursing Facility? No.     Mariana calling from [facility] requesting the following orders that are NOT within the Home Care, Assisted Living or Nursing Home Eval and Treatment standing order and must be ordered by a Licensed Practitioner.    Preferred Call Back Number: [phone], confidential     1. PT 1W4W (no standing order found)  2. DME order for air mattress d/t pt being high risk for pressure sores. He is paraplegic and already has a hospital bed but does have blanchable redness on coccyx.    3. OT eval for ADL safety assessment.     Routing to Licensed Practitioner (Provider) to review request and provide approval or recommendation.    Writer has verified Requestor will send fax to have orders signed.    Emmy MONTESINOS RN  [facility]

## 2022-07-13 NOTE — TELEPHONE ENCOUNTER
Per chart review home order placed 6/6/22      PCP pended Misc. DME order for air mattress, please review and add dx code. Once ordered please route back to triage to notify home care of DME order and verbal for PT and OT orders.     Dorothea Pérez RN  [facility]

## 2022-07-14 ENCOUNTER — VIRTUAL VISIT (OUTPATIENT)
Dept: PHARMACY | Facility: CLINIC | Age: 62
End: 2022-07-14
Payer: MEDICAID

## 2022-07-14 DIAGNOSIS — Z87.820 HISTORY OF TRAUMATIC BRAIN INJURY: ICD-10-CM

## 2022-07-14 DIAGNOSIS — R65.20 SEVERE SEPSIS (H): Primary | ICD-10-CM

## 2022-07-14 DIAGNOSIS — R52 INTERMITTENT PAIN: ICD-10-CM

## 2022-07-14 DIAGNOSIS — R13.10 DYSPHAGIA, UNSPECIFIED TYPE: ICD-10-CM

## 2022-07-14 DIAGNOSIS — E23.0 PANHYPOPITUITARISM (H): ICD-10-CM

## 2022-07-14 DIAGNOSIS — A41.9 SEVERE SEPSIS (H): Primary | ICD-10-CM

## 2022-07-14 DIAGNOSIS — E03.9 HYPOTHYROIDISM, UNSPECIFIED TYPE: ICD-10-CM

## 2022-07-14 DIAGNOSIS — E87.1 HYPONATREMIA: ICD-10-CM

## 2022-07-14 DIAGNOSIS — K21.9 GASTROESOPHAGEAL REFLUX DISEASE, UNSPECIFIED WHETHER ESOPHAGITIS PRESENT: ICD-10-CM

## 2022-07-14 DIAGNOSIS — E29.1 HYPOGONADISM MALE: ICD-10-CM

## 2022-07-14 DIAGNOSIS — J96.01 ACUTE RESPIRATORY FAILURE WITH HYPOXIA (H): ICD-10-CM

## 2022-07-14 DIAGNOSIS — Z79.82 ASPIRIN LONG-TERM USE: ICD-10-CM

## 2022-07-14 DIAGNOSIS — G40.909 RECURRENT SEIZURES (H): ICD-10-CM

## 2022-07-14 DIAGNOSIS — K92.1 MELENA: ICD-10-CM

## 2022-07-14 DIAGNOSIS — Z78.9 TAKES DIETARY SUPPLEMENTS: ICD-10-CM

## 2022-07-14 PROCEDURE — 99606 MTMS BY PHARM EST 15 MIN: CPT | Performed by: PHARMACIST

## 2022-07-14 PROCEDURE — 99607 MTMS BY PHARM ADDL 15 MIN: CPT | Performed by: PHARMACIST

## 2022-07-14 NOTE — PROGRESS NOTES
Medication Therapy Management (MTM) Encounter    ASSESSMENT:                            Medication Adherence/Access: See below for considerations    Sepsis/Acute Respiratory Failure/Hyponatremia/Dysphagia: Improved per patient's caregiver.  Unclear if prior authorization was ever attempted for phos-nak packets. Will see if primary care is willing to take over scopolamine prescription though he is seeing a new pulmonology provider sooenr.     Prevent MI/Stroke:  Melena improved.     History of traumatic brain injury/Recurrent seizures: Stable.     GERD/Melena: Stable/improve.     Hypothyroidism: Stable.     Secondary adrenal insufficiency/Hypogonadism: Stable.     Pain:  Stable.     Supplements: Stable.     PLAN:                            1. I will reach out to Dr. Queen to see if she is comfortable refilling Keyon's scopolamine compound prescription for aspiration prevention.    2. I will see if we can get [employer] Phos-NAK packets approved by his insurance to see if this helps with costs.      Follow-up: 2-4 weeks or sooner IF needed    SUBJECTIVE/OBJECTIVE:                          Keyon Farias is a 61 year old male called for a transitions of care visit. He was discharged from [facility] on 7/1/2022 for sepsis/acute respiratory failure due to COVID-19 pneumonia. Did go to emergency department on 7/4/2022 for melena. Visit conducted today with mother, Savannah, as patient is non-verbal.     Reason for visit: Hospital Follow-Up.    Allergies/ADRs: Reviewed in chart  Past Medical History: Reviewed in chart  Tobacco: He reports that he quit smoking about 33 years ago. He has never used smokeless tobacco.  Alcohol: none    Medication Adherence/Access: see below.  Medications are administered via G-tube by mother    Sepsis/Acute Respiratory Failure/Hyponatremia/Dysphagia: Patient was admitted 6/16/2022 through 7/1/2022 and was discharged to home.  Savannah reports that he's doing well following discharge, no  "questions or major concerns. Current medications include sodium bicarbonate 650 mg twice daily and does take potassium/sodium phosphates packet three times daily (they pay out of pocket for these). She does ask about some medication costs/access concerns - including Phos NAK packets (states are >$100 out of pocket) and scopolamine powder (previous provider who prescribed these left).  He does have homecare. Keyon also receives acetylcystine 20% nebs four times daily along with albuterol nebs. When he's \"rattly\" - Savannah uses scopolamine powder three times daily and/or Mucinex.  She's currently crushing Mucinex tablets to administer.  Keyon does work closely with pulmonology.  No side effects reported.  Savannah reports Keyon's breathing has been stable. For G-tube care, Savannah uses bacitracin, mupirocin, or hydrocortisone as needed.  She reports this is effective.  She requests a refill of mupirocin ointment.  Sodium   Date Value Ref Range Status   07/05/2022 132 (L) 133 - 144 mmol/L Final   05/24/2021 143 133 - 144 mmol/L Final     Lab Results   Component Value Date    PHOS 2.3 (L) 06/30/2022    PHOS 2.6 06/29/2022    PHOS 2.5 06/28/2022     Prevent MI/Stroke:  Patient currently off aspirin and Eliquis due to melena.  Dark stools have cleared up already.  Per review of Epic chart - patient has history of unspecified cerebral artery occlusion with cerebral infarction in 1989.  No signs and symptoms bruising/bleeding noted during visit today.    History of traumatic brain injury/Recurrent seizures: Patient is taking carbamazepine 150 mg three times daily (0600, 1200, 2400)/carbamazepine 100 mg once daily at 1800 and brivaracetam 100 mg twice daily at 0900/2100. Denies any recent episodes.     GERD/Melena: Current medications include: Protonix (pantoprazole) 40 mg once daily and metoclopramide three times daily (although prescribed for four times daily). Savannah reports no current symptoms and indicates current regimen " is effective.    Hypothyroidism: Patient is taking levothyroxine 150 mcg daily. Patient is having the following symptoms: none.   TSH   Date Value Ref Range Status   05/09/2022 <0.01 (L) 0.40 - 4.00 mU/L Final   07/05/2018 <0.01 (L) 0.40 - 4.00 mU/L Final     T4 Free   Date Value Ref Range Status   04/22/2021 1.40 0.76 - 1.46 ng/dL Final     Free T4   Date Value Ref Range Status   05/09/2022 1.42 0.76 - 1.46 ng/dL Final     Secondary adrenal insufficiency/Hypogonadism: Patient is receiving hydrocortisone 15 mg every morning/5 mg every afternoon and testosterone cypionate 60 mg every 7 days. Follows with endocrinology. Denies any known issues.      Pain:  Patient has acetaminophen available for use, but is not currently needing to use.  No side effects reported when needed.     Supplements: Patient is receive vitamin C 1,000 mg daily, vitamin D3 2,000 units daily, a daily multivitamin, and calcium carbonate three times daily. Denies any current issues.     Today's Vitals: There were no vitals taken for this visit.     Wt Readings from Last 5 Encounters:   06/27/22 152 lb 1.9 oz (69 kg)   06/07/22 156 lb 8 oz (71 kg)   05/11/22 150 lb 3.2 oz (68.1 kg)   03/18/22 160 lb (72.6 kg)   12/11/21 165 lb 12.6 oz (75.2 kg)     BP Readings from Last 3 Encounters:   07/05/22 106/66   07/01/22 108/65   06/08/22 110/62     ----------------  Post Discharge Medication Reconciliation Status: discharge medications reconciled, continue medications without change.    I spent 25 minutes with this patient today. I offer these suggestions for consideration by Dr. Queen. A copy of the visit note was provided to the patient's provider(s).    The patient was mailed a summary of these recommendations.     Naveed Ortiz, JustinD, BCACP  Medication Therapy Management Pharmacist  Pager: [phone]    Telemedicine Visit Details  Type of service:  Telephone visit  Start Time: 1:00 PM  End Time: 1:25 PM  Originating Location (patient location):  Home  Distant Location (provider location):  [facility]     Medication Therapy Recommendations  No medication therapy recommendations to display

## 2022-07-15 NOTE — TELEPHONE ENCOUNTER
Writer called [facility], Left detailed voicemail for approval of PT and OT orders. Also notified [facility] that DME order signed and asked to call back if order needed to be faxed or  to call  ([phone]) if there are any questions.     Dorothea Pérez RN  [facility]

## 2022-07-15 NOTE — TELEPHONE ENCOUNTER
Mariana returned call and requests the DME order for air mattress be faxed to [facility]. Routing to team to assist with faxing.     Emmy MONTESINOS RN  [facility]

## 2022-07-17 ENCOUNTER — TELEPHONE (OUTPATIENT)
Dept: FAMILY MEDICINE | Facility: CLINIC | Age: 62
End: 2022-07-17

## 2022-07-18 NOTE — TELEPHONE ENCOUNTER
Prior Authorization Retail Medication Request    Medication/Dose: PHOS-NAK POWDER PACKS 280-160-250 mg  ICD code (if different than what is on RX):  E83.39  Previously Tried and Failed:  N/A  Rationale:  Patient hospitalized multiple times. Phosphorus level continues to come back low if unable to take.     Insurance Name:  [employer]  Insurance ID:  [id number]       Pharmacy Information (if different than what is on RX)  Name:  [id number]  Phone:  [phone]    Naveed Ortiz, JustinD, BCACP  Medication Therapy Management Pharmacist  Pager: [phone]

## 2022-07-18 NOTE — PATIENT INSTRUCTIONS
"Recommendations from today's MTM visit:                                                    MTM (medication therapy management) is a service provided by a clinical pharmacist designed to help you get the most of out of your medicines.   Today we reviewed what your medicines are for, how to know if they are working, that your medicines are safe and how to make your medicine regimen as easy as possible.      1. I will reach out to Dr. Queen to see if she is comfortable refilling Keyon's scopolamine compound prescription for aspiration prevention.    2. I will see if we can get Keyon's Phos-NAK packets approved by his insurance to see if this helps with costs.      Follow-up: 2-4 weeks or sooner IF needed    It was great speaking with you today.  I value your experience and would be very thankful for your time in providing feedback in our clinic survey. In the next few days, you may receive an email or text message from [employer] with a link to a survey related to your  clinical pharmacist.\"     To schedule another MTM appointment, please call the clinic directly or you may call the MTM scheduling line at [phone] or toll-free at [phone].     My Clinical Pharmacist's contact information:                                                      Please feel free to contact me with any questions or concerns you have.      Naveed Ortiz, PharmD, [facility]  Medication Therapy Management Pharmacist  Pager: [phone]      "

## 2022-07-19 NOTE — TELEPHONE ENCOUNTER
Central Prior Authorization Team   Phone: [phone]      PA Initiation    Medication: PHOS-NAK POWDER PACKS 280-160-250 mg - INITIATED  Insurance Company: [facility] - Phone [phone] Fax [phone]  Pharmacy Filling the Rx: [id number] - [place], [place] - [address] AT [facility] [address]  Filling Pharmacy Phone: [phone]  Filling Pharmacy Fax:    Start Date: 7/19/2022

## 2022-07-20 NOTE — TELEPHONE ENCOUNTER
PRIOR AUTHORIZATION DENIED    Medication: PHOS-NAK POWDER PACKS 280-160-250 mg - DENIED    Denial Date: 7/20/2022    Denial Rational: VITAMINS AND MINERALS ARE EXCLUDED FROM MEDICARE PART D COVERAGE      Appeal Information: IF PROVIDER WOULD LIKE TO APPEAL THIS DECISION PLEASE PROVIDE PA TEAM WITH LETTER OF MEDICAL NECESSITY

## 2022-07-20 NOTE — TELEPHONE ENCOUNTER
Called insurance to find out status of PA. They did not see an open case. Initiated PA by phone. Case# [id number]

## 2022-07-21 ENCOUNTER — TELEPHONE (OUTPATIENT)
Dept: FAMILY MEDICINE | Facility: CLINIC | Age: 62
End: 2022-07-21

## 2022-07-21 NOTE — TELEPHONE ENCOUNTER
Mariana, PT with [facility], calling to FYI pcp plan to decrease PT frequency from original 1w5 to 1w4 anticipating pt to meet goals earlier than expected.     OT daljit moved up to next week d/t pt need for wheelchair assessment.

## 2022-07-25 NOTE — TELEPHONE ENCOUNTER
Spoke to patients mom regarding a few openings this week. She stated he would need a video as it is hard to get him any where. Informed will call if anything opens up sooner for a video visit.

## 2022-07-27 ENCOUNTER — MEDICAL CORRESPONDENCE (OUTPATIENT)
Dept: HEALTH INFORMATION MANAGEMENT | Facility: CLINIC | Age: 62
End: 2022-07-27

## 2022-08-01 ENCOUNTER — MEDICAL CORRESPONDENCE (OUTPATIENT)
Dept: FAMILY MEDICINE | Facility: CLINIC | Age: 62
End: 2022-08-01

## 2022-08-02 ENCOUNTER — VIRTUAL VISIT (OUTPATIENT)
Dept: GASTROENTEROLOGY | Facility: CLINIC | Age: 62
End: 2022-08-02
Attending: INTERNAL MEDICINE
Payer: MEDICARE

## 2022-08-02 DIAGNOSIS — K92.1 BLACK TARRY STOOLS: ICD-10-CM

## 2022-08-02 PROCEDURE — 99205 OFFICE O/P NEW HI 60 MIN: CPT | Mod: 95 | Performed by: STUDENT IN AN ORGANIZED HEALTH CARE EDUCATION/TRAINING PROGRAM
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
## 2022-08-02 NOTE — TELEPHONE ENCOUNTER
Spoke to patients mom regarding opening. It works so patient is scheduled with Katie Jensen at 4pm via video.
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
## 2022-08-12 ENCOUNTER — TELEPHONE (OUTPATIENT)
Dept: FAMILY MEDICINE | Facility: CLINIC | Age: 62
End: 2022-08-12

## 2022-08-12 DIAGNOSIS — E27.49 SECONDARY ADRENAL INSUFFICIENCY (H): ICD-10-CM

## 2022-08-12 DIAGNOSIS — G82.20 PARAPLEGIA (H): Primary | ICD-10-CM

## 2022-08-12 NOTE — TELEPHONE ENCOUNTER
Caridad, OT with [facility], calling for assistance in obtaining reclining rolling shower chair.  requesting DME order to get the process started.      Needs DME faxed to  Lakia Brady at [phone]

## 2022-08-12 NOTE — TELEPHONE ENCOUNTER
[facility] Call Center    Phone Message    May a detailed message be left on voicemail: yes     Reason for Call: Medication Refill Request    Has the patient contacted the pharmacy for the refill? Yes   Name of medication being requested: hydrocortisone (CORTEF) 5 MG tablet  Provider who prescribed the medication: Faizan Mclean MD  Pharmacy:[id number] - [place], [place] - [address] AT [facility] [address]  Date medication is needed:As soon as possible. Only 1 tablet left for tonight.       Action Taken: Other: Endo    Travel Screening: Not Applicable

## 2022-08-15 RX ORDER — HYDROCORTISONE 5 MG/1
TABLET ORAL
Qty: 400 TABLET | Refills: 3 | Status: SHIPPED | OUTPATIENT
Start: 2022-08-15 | End: 2023-09-02

## 2022-09-06 ENCOUNTER — MEDICAL CORRESPONDENCE (OUTPATIENT)
Dept: FAMILY MEDICINE | Facility: CLINIC | Age: 62
End: 2022-09-06

## 2022-09-09 ENCOUNTER — TRANSFERRED RECORDS (OUTPATIENT)
Dept: HEALTH INFORMATION MANAGEMENT | Facility: CLINIC | Age: 62
End: 2022-09-09

## 2022-09-17 DIAGNOSIS — E03.8 SECONDARY HYPOTHYROIDISM: ICD-10-CM

## 2022-09-20 RX ORDER — LEVOTHYROXINE SODIUM 150 UG/1
TABLET ORAL
Qty: 90 TABLET | Refills: 3 | Status: SHIPPED | OUTPATIENT
Start: 2022-09-20 | End: 2023-09-20

## 2022-09-20 NOTE — TELEPHONE ENCOUNTER
LEVOTHYROXINE 0.150MG (150MCG) TAB  Last Written Prescription Date:  9/23/21  Last Fill Quantity: 90,   # refills: 3  Last Office Visit : 1/5/22  Future Office visit:  2/8/2023    Routing refill request to provider for review/approval because:  ABN TSH of 0.01 5/9/22, Free T4=1.42. Labs done while in hospital for SIADH    05/09/22  1122    TSH <0.01*     Free T4 0.76 - 1.46 ng/dL 1.42

## 2022-11-15 ENCOUNTER — MEDICAL CORRESPONDENCE (OUTPATIENT)
Dept: HEALTH INFORMATION MANAGEMENT | Facility: CLINIC | Age: 62
End: 2022-11-15

## 2022-11-20 ENCOUNTER — APPOINTMENT (OUTPATIENT)
Dept: CT IMAGING | Facility: CLINIC | Age: 62
DRG: 871 | End: 2022-11-20
Attending: EMERGENCY MEDICINE
Payer: MEDICARE

## 2022-11-20 ENCOUNTER — HOSPITAL ENCOUNTER (INPATIENT)
Facility: CLINIC | Age: 62
LOS: 7 days | Discharge: HOME HEALTH | DRG: 871 | End: 2022-11-28
Attending: EMERGENCY MEDICINE | Admitting: INTERNAL MEDICINE
Payer: MEDICARE

## 2022-11-20 ENCOUNTER — APPOINTMENT (OUTPATIENT)
Dept: GENERAL RADIOLOGY | Facility: CLINIC | Age: 62
DRG: 871 | End: 2022-11-20
Attending: EMERGENCY MEDICINE
Payer: MEDICARE

## 2022-11-20 DIAGNOSIS — R79.89 ELEVATED LACTIC ACID LEVEL: ICD-10-CM

## 2022-11-20 DIAGNOSIS — I42.1 HYPERTROPHIC OBSTRUCTIVE CARDIOMYOPATHY (H): Primary | ICD-10-CM

## 2022-11-20 DIAGNOSIS — K66.8 PNEUMOPERITONEUM: ICD-10-CM

## 2022-11-20 DIAGNOSIS — J69.0 ASPIRATION PNEUMONIA OF RIGHT LOWER LOBE, UNSPECIFIED ASPIRATION PNEUMONIA TYPE (H): ICD-10-CM

## 2022-11-20 DIAGNOSIS — R50.9 FEVER IN ADULT: ICD-10-CM

## 2022-11-20 LAB
ALBUMIN SERPL-MCNC: 3.4 G/DL (ref 3.4–5)
ALBUMIN UR-MCNC: 30 MG/DL
ALP SERPL-CCNC: 150 U/L (ref 40–150)
ALT SERPL W P-5'-P-CCNC: 46 U/L (ref 0–70)
ANION GAP SERPL CALCULATED.3IONS-SCNC: 7 MMOL/L (ref 3–14)
APPEARANCE UR: CLEAR
AST SERPL W P-5'-P-CCNC: 55 U/L (ref 0–45)
BASOPHILS # BLD AUTO: 0.1 10E3/UL (ref 0–0.2)
BASOPHILS NFR BLD AUTO: 1 %
BILIRUB SERPL-MCNC: 0.3 MG/DL (ref 0.2–1.3)
BILIRUB UR QL STRIP: NEGATIVE
BUN SERPL-MCNC: 26 MG/DL (ref 7–30)
CALCIUM SERPL-MCNC: 9.2 MG/DL (ref 8.5–10.1)
CHLORIDE BLD-SCNC: 102 MMOL/L (ref 94–109)
CO2 SERPL-SCNC: 31 MMOL/L (ref 20–32)
COLOR UR AUTO: YELLOW
CREAT SERPL-MCNC: 0.99 MG/DL (ref 0.66–1.25)
EOSINOPHIL # BLD AUTO: 0.3 10E3/UL (ref 0–0.7)
EOSINOPHIL NFR BLD AUTO: 3 %
ERYTHROCYTE [DISTWIDTH] IN BLOOD BY AUTOMATED COUNT: 13.3 % (ref 10–15)
FLUAV RNA SPEC QL NAA+PROBE: NEGATIVE
FLUBV RNA RESP QL NAA+PROBE: NEGATIVE
GFR SERPL CREATININE-BSD FRML MDRD: 87 ML/MIN/1.73M2
GLUCOSE BLD-MCNC: 136 MG/DL (ref 70–99)
GLUCOSE UR STRIP-MCNC: NEGATIVE MG/DL
HCO3 BLDV-SCNC: 38 MMOL/L (ref 21–28)
HCT VFR BLD AUTO: 48.5 % (ref 40–53)
HGB BLD-MCNC: 15.6 G/DL (ref 13.3–17.7)
HGB UR QL STRIP: NEGATIVE
HOLD SPECIMEN: NORMAL
IMM GRANULOCYTES # BLD: 0 10E3/UL
IMM GRANULOCYTES NFR BLD: 0 %
KETONES UR STRIP-MCNC: ABNORMAL MG/DL
LACTATE BLD-SCNC: 2.1 MMOL/L
LEUKOCYTE ESTERASE UR QL STRIP: NEGATIVE
LIPASE SERPL-CCNC: 326 U/L (ref 73–393)
LYMPHOCYTES # BLD AUTO: 3.1 10E3/UL (ref 0.8–5.3)
LYMPHOCYTES NFR BLD AUTO: 31 %
MCH RBC QN AUTO: 28.2 PG (ref 26.5–33)
MCHC RBC AUTO-ENTMCNC: 32.2 G/DL (ref 31.5–36.5)
MCV RBC AUTO: 88 FL (ref 78–100)
MONOCYTES # BLD AUTO: 0.6 10E3/UL (ref 0–1.3)
MONOCYTES NFR BLD AUTO: 6 %
NEUTROPHILS # BLD AUTO: 5.8 10E3/UL (ref 1.6–8.3)
NEUTROPHILS NFR BLD AUTO: 59 %
NITRATE UR QL: NEGATIVE
NRBC # BLD AUTO: 0 10E3/UL
NRBC BLD AUTO-RTO: 0 /100
PCO2 BLDV: 53 MM HG (ref 40–50)
PH BLDV: 7.46 [PH] (ref 7.32–7.43)
PH UR STRIP: 7 [PH] (ref 5–7)
PLATELET # BLD AUTO: 191 10E3/UL (ref 150–450)
PO2 BLDV: 33 MM HG (ref 25–47)
POTASSIUM BLD-SCNC: 3.5 MMOL/L (ref 3.4–5.3)
PROT SERPL-MCNC: 8.4 G/DL (ref 6.8–8.8)
RBC # BLD AUTO: 5.54 10E6/UL (ref 4.4–5.9)
RBC URINE: 1 /HPF
RSV RNA SPEC NAA+PROBE: NEGATIVE
SAO2 % BLDV: 67 % (ref 94–100)
SARS-COV-2 RNA RESP QL NAA+PROBE: NEGATIVE
SODIUM SERPL-SCNC: 140 MMOL/L (ref 133–144)
SP GR UR STRIP: 1.03 (ref 1–1.03)
SQUAMOUS EPITHELIAL: 1 /HPF
UROBILINOGEN UR STRIP-MCNC: >12 MG/DL
WBC # BLD AUTO: 9.9 10E3/UL (ref 4–11)
WBC URINE: 2 /HPF

## 2022-11-20 PROCEDURE — C9113 INJ PANTOPRAZOLE SODIUM, VIA: HCPCS | Performed by: EMERGENCY MEDICINE

## 2022-11-20 PROCEDURE — 82803 BLOOD GASES ANY COMBINATION: CPT

## 2022-11-20 PROCEDURE — 87086 URINE CULTURE/COLONY COUNT: CPT | Performed by: EMERGENCY MEDICINE

## 2022-11-20 PROCEDURE — 87637 SARSCOV2&INF A&B&RSV AMP PRB: CPT | Performed by: EMERGENCY MEDICINE

## 2022-11-20 PROCEDURE — 87149 DNA/RNA DIRECT PROBE: CPT | Performed by: EMERGENCY MEDICINE

## 2022-11-20 PROCEDURE — C9803 HOPD COVID-19 SPEC COLLECT: HCPCS

## 2022-11-20 PROCEDURE — 99285 EMERGENCY DEPT VISIT HI MDM: CPT | Mod: CS,25

## 2022-11-20 PROCEDURE — 71046 X-RAY EXAM CHEST 2 VIEWS: CPT

## 2022-11-20 PROCEDURE — 74177 CT ABD & PELVIS W/CONTRAST: CPT | Mod: MA

## 2022-11-20 PROCEDURE — 250N000011 HC RX IP 250 OP 636: Performed by: EMERGENCY MEDICINE

## 2022-11-20 PROCEDURE — 258N000003 HC RX IP 258 OP 636: Performed by: EMERGENCY MEDICINE

## 2022-11-20 PROCEDURE — 87077 CULTURE AEROBIC IDENTIFY: CPT | Performed by: EMERGENCY MEDICINE

## 2022-11-20 PROCEDURE — 83036 HEMOGLOBIN GLYCOSYLATED A1C: CPT | Performed by: INTERNAL MEDICINE

## 2022-11-20 PROCEDURE — 85025 COMPLETE CBC W/AUTO DIFF WBC: CPT | Performed by: EMERGENCY MEDICINE

## 2022-11-20 PROCEDURE — 51798 US URINE CAPACITY MEASURE: CPT

## 2022-11-20 PROCEDURE — 96375 TX/PRO/DX INJ NEW DRUG ADDON: CPT

## 2022-11-20 PROCEDURE — 250N000009 HC RX 250: Performed by: EMERGENCY MEDICINE

## 2022-11-20 PROCEDURE — 83690 ASSAY OF LIPASE: CPT | Performed by: EMERGENCY MEDICINE

## 2022-11-20 PROCEDURE — 96374 THER/PROPH/DIAG INJ IV PUSH: CPT | Mod: 59

## 2022-11-20 PROCEDURE — 81001 URINALYSIS AUTO W/SCOPE: CPT | Performed by: EMERGENCY MEDICINE

## 2022-11-20 PROCEDURE — 250N000013 HC RX MED GY IP 250 OP 250 PS 637: Performed by: EMERGENCY MEDICINE

## 2022-11-20 PROCEDURE — 96361 HYDRATE IV INFUSION ADD-ON: CPT

## 2022-11-20 PROCEDURE — 36415 COLL VENOUS BLD VENIPUNCTURE: CPT | Performed by: EMERGENCY MEDICINE

## 2022-11-20 PROCEDURE — 80053 COMPREHEN METABOLIC PANEL: CPT | Performed by: EMERGENCY MEDICINE

## 2022-11-20 RX ORDER — LIDOCAINE HYDROCHLORIDE 20 MG/ML
10 JELLY TOPICAL ONCE
Status: COMPLETED | OUTPATIENT
Start: 2022-11-20 | End: 2022-11-20

## 2022-11-20 RX ORDER — IOPAMIDOL 755 MG/ML
76 INJECTION, SOLUTION INTRAVASCULAR ONCE
Status: COMPLETED | OUTPATIENT
Start: 2022-11-20 | End: 2022-11-20

## 2022-11-20 RX ORDER — KETOROLAC TROMETHAMINE 15 MG/ML
15 INJECTION, SOLUTION INTRAMUSCULAR; INTRAVENOUS ONCE
Status: COMPLETED | OUTPATIENT
Start: 2022-11-20 | End: 2022-11-20

## 2022-11-20 RX ORDER — IBUPROFEN 100 MG/5ML
600 SUSPENSION, ORAL (FINAL DOSE FORM) ORAL ONCE
Status: DISCONTINUED | OUTPATIENT
Start: 2022-11-20 | End: 2022-11-21

## 2022-11-20 RX ORDER — ONDANSETRON 2 MG/ML
4 INJECTION INTRAMUSCULAR; INTRAVENOUS ONCE
Status: COMPLETED | OUTPATIENT
Start: 2022-11-20 | End: 2022-11-20

## 2022-11-20 RX ORDER — GUAIFENESIN 600 MG/1
1200 TABLET, EXTENDED RELEASE ORAL 2 TIMES DAILY PRN
COMMUNITY
End: 2023-04-26

## 2022-11-20 RX ADMIN — ONDANSETRON 4 MG: 2 INJECTION INTRAMUSCULAR; INTRAVENOUS at 22:59

## 2022-11-20 RX ADMIN — LIDOCAINE HYDROCHLORIDE 6 ML: 20 JELLY TOPICAL at 21:16

## 2022-11-20 RX ADMIN — IOPAMIDOL 76 ML: 755 INJECTION, SOLUTION INTRAVENOUS at 23:47

## 2022-11-20 RX ADMIN — SODIUM CHLORIDE 62 ML: 900 INJECTION INTRAVENOUS at 23:47

## 2022-11-20 RX ADMIN — SODIUM CHLORIDE, POTASSIUM CHLORIDE, SODIUM LACTATE AND CALCIUM CHLORIDE 1000 ML: 600; 310; 30; 20 INJECTION, SOLUTION INTRAVENOUS at 21:40

## 2022-11-20 RX ADMIN — KETOROLAC TROMETHAMINE 15 MG: 15 INJECTION, SOLUTION INTRAMUSCULAR; INTRAVENOUS at 23:00

## 2022-11-20 RX ADMIN — PANTOPRAZOLE SODIUM 40 MG: 40 INJECTION, POWDER, FOR SOLUTION INTRAVENOUS at 23:02

## 2022-11-20 ASSESSMENT — ACTIVITIES OF DAILY LIVING (ADL)
ADLS_ACUITY_SCORE: 37
ADLS_ACUITY_SCORE: 37

## 2022-11-21 ENCOUNTER — APPOINTMENT (OUTPATIENT)
Dept: CARDIOLOGY | Facility: CLINIC | Age: 62
DRG: 871 | End: 2022-11-21
Attending: INTERNAL MEDICINE
Payer: MEDICARE

## 2022-11-21 PROBLEM — R50.9 FEVER IN ADULT: Status: ACTIVE | Noted: 2022-11-21

## 2022-11-21 PROBLEM — R79.89 ELEVATED LACTIC ACID LEVEL: Status: ACTIVE | Noted: 2022-11-21

## 2022-11-21 PROBLEM — K66.8 PNEUMOPERITONEUM: Status: ACTIVE | Noted: 2022-11-21

## 2022-11-21 LAB
ALBUMIN SERPL-MCNC: 2.3 G/DL (ref 3.4–5)
ALP SERPL-CCNC: 84 U/L (ref 40–150)
ALT SERPL W P-5'-P-CCNC: 24 U/L (ref 0–70)
ANION GAP SERPL CALCULATED.3IONS-SCNC: 3 MMOL/L (ref 3–14)
AST SERPL W P-5'-P-CCNC: 26 U/L (ref 0–45)
BILIRUB SERPL-MCNC: 0.7 MG/DL (ref 0.2–1.3)
BUN SERPL-MCNC: 20 MG/DL (ref 7–30)
CALCIUM SERPL-MCNC: 7.9 MG/DL (ref 8.5–10.1)
CHLORIDE BLD-SCNC: 109 MMOL/L (ref 94–109)
CO2 SERPL-SCNC: 29 MMOL/L (ref 20–32)
CREAT SERPL-MCNC: 1.12 MG/DL (ref 0.66–1.25)
CRP SERPL-MCNC: 116 MG/L (ref 0–8)
DEPRECATED CALCIDIOL+CALCIFEROL SERPL-MC: 10 UG/L (ref 20–75)
ERYTHROCYTE [DISTWIDTH] IN BLOOD BY AUTOMATED COUNT: 13.5 % (ref 10–15)
GFR SERPL CREATININE-BSD FRML MDRD: 75 ML/MIN/1.73M2
GLUCOSE BLD-MCNC: 183 MG/DL (ref 70–99)
GLUCOSE BLDC GLUCOMTR-MCNC: 113 MG/DL (ref 70–99)
GLUCOSE BLDC GLUCOMTR-MCNC: 140 MG/DL (ref 70–99)
GLUCOSE BLDC GLUCOMTR-MCNC: 158 MG/DL (ref 70–99)
GLUCOSE BLDC GLUCOMTR-MCNC: 180 MG/DL (ref 70–99)
GLUCOSE BLDC GLUCOMTR-MCNC: 181 MG/DL (ref 70–99)
GLUCOSE BLDC GLUCOMTR-MCNC: 192 MG/DL (ref 70–99)
HBA1C MFR BLD: 5.6 % (ref 0–5.6)
HCT VFR BLD AUTO: 32.6 % (ref 40–53)
HGB BLD-MCNC: 10.3 G/DL (ref 13.3–17.7)
LACTATE SERPL-SCNC: 3 MMOL/L (ref 0.7–2)
LACTATE SERPL-SCNC: 3.1 MMOL/L (ref 0.7–2)
LACTATE SERPL-SCNC: 3.1 MMOL/L (ref 0.7–2)
LACTATE SERPL-SCNC: 3.5 MMOL/L (ref 0.7–2)
LACTATE SERPL-SCNC: 3.6 MMOL/L (ref 0.7–2)
LVEF ECHO: NORMAL
MAGNESIUM SERPL-MCNC: 2 MG/DL (ref 1.6–2.3)
MCH RBC QN AUTO: 28.6 PG (ref 26.5–33)
MCHC RBC AUTO-ENTMCNC: 31.6 G/DL (ref 31.5–36.5)
MCV RBC AUTO: 91 FL (ref 78–100)
PHOSPHATE SERPL-MCNC: 1.3 MG/DL (ref 2.5–4.5)
PHOSPHATE SERPL-MCNC: 2 MG/DL (ref 2.5–4.5)
PLATELET # BLD AUTO: 91 10E3/UL (ref 150–450)
POTASSIUM BLD-SCNC: 3.8 MMOL/L (ref 3.4–5.3)
PROCALCITONIN SERPL-MCNC: 0.8 NG/ML
PROT SERPL-MCNC: 5.7 G/DL (ref 6.8–8.8)
RADIOLOGIST FLAGS: ABNORMAL
RBC # BLD AUTO: 3.6 10E6/UL (ref 4.4–5.9)
SODIUM SERPL-SCNC: 141 MMOL/L (ref 133–144)
WBC # BLD AUTO: 12 10E3/UL (ref 4–11)

## 2022-11-21 PROCEDURE — 99291 CRITICAL CARE FIRST HOUR: CPT | Performed by: HOSPITALIST

## 2022-11-21 PROCEDURE — 36415 COLL VENOUS BLD VENIPUNCTURE: CPT | Performed by: INTERNAL MEDICINE

## 2022-11-21 PROCEDURE — 999N000128 HC STATISTIC PERIPHERAL IV START W/O US GUIDANCE

## 2022-11-21 PROCEDURE — 999N000040 HC STATISTIC CONSULT NO CHARGE VASC ACCESS

## 2022-11-21 PROCEDURE — 250N000011 HC RX IP 250 OP 636: Performed by: HOSPITALIST

## 2022-11-21 PROCEDURE — 250N000009 HC RX 250: Performed by: HOSPITALIST

## 2022-11-21 PROCEDURE — 258N000003 HC RX IP 258 OP 636: Performed by: HOSPITALIST

## 2022-11-21 PROCEDURE — 250N000011 HC RX IP 250 OP 636: Performed by: EMERGENCY MEDICINE

## 2022-11-21 PROCEDURE — 84630 ASSAY OF ZINC: CPT | Performed by: INTERNAL MEDICINE

## 2022-11-21 PROCEDURE — 36415 COLL VENOUS BLD VENIPUNCTURE: CPT | Performed by: EMERGENCY MEDICINE

## 2022-11-21 PROCEDURE — P9045 ALBUMIN (HUMAN), 5%, 250 ML: HCPCS | Performed by: HOSPITALIST

## 2022-11-21 PROCEDURE — 99222 1ST HOSP IP/OBS MODERATE 55: CPT | Performed by: SURGERY

## 2022-11-21 PROCEDURE — 85018 HEMOGLOBIN: CPT | Performed by: HOSPITALIST

## 2022-11-21 PROCEDURE — 94640 AIRWAY INHALATION TREATMENT: CPT

## 2022-11-21 PROCEDURE — 999N000157 HC STATISTIC RCP TIME EA 10 MIN

## 2022-11-21 PROCEDURE — 93306 TTE W/DOPPLER COMPLETE: CPT

## 2022-11-21 PROCEDURE — 84145 PROCALCITONIN (PCT): CPT | Performed by: HOSPITALIST

## 2022-11-21 PROCEDURE — 258N000003 HC RX IP 258 OP 636: Performed by: EMERGENCY MEDICINE

## 2022-11-21 PROCEDURE — 36415 COLL VENOUS BLD VENIPUNCTURE: CPT | Performed by: HOSPITALIST

## 2022-11-21 PROCEDURE — 84100 ASSAY OF PHOSPHORUS: CPT | Performed by: HOSPITALIST

## 2022-11-21 PROCEDURE — 83605 ASSAY OF LACTIC ACID: CPT | Performed by: INTERNAL MEDICINE

## 2022-11-21 PROCEDURE — 250N000011 HC RX IP 250 OP 636: Performed by: INTERNAL MEDICINE

## 2022-11-21 PROCEDURE — 83735 ASSAY OF MAGNESIUM: CPT | Performed by: INTERNAL MEDICINE

## 2022-11-21 PROCEDURE — 250N000013 HC RX MED GY IP 250 OP 250 PS 637: Performed by: HOSPITALIST

## 2022-11-21 PROCEDURE — 200N000001 HC R&B ICU

## 2022-11-21 PROCEDURE — C9113 INJ PANTOPRAZOLE SODIUM, VIA: HCPCS | Performed by: INTERNAL MEDICINE

## 2022-11-21 PROCEDURE — 250N000009 HC RX 250

## 2022-11-21 PROCEDURE — 80053 COMPREHEN METABOLIC PANEL: CPT | Performed by: HOSPITALIST

## 2022-11-21 PROCEDURE — 83605 ASSAY OF LACTIC ACID: CPT | Performed by: HOSPITALIST

## 2022-11-21 PROCEDURE — 93306 TTE W/DOPPLER COMPLETE: CPT | Mod: 26 | Performed by: INTERNAL MEDICINE

## 2022-11-21 PROCEDURE — 83605 ASSAY OF LACTIC ACID: CPT

## 2022-11-21 PROCEDURE — 86140 C-REACTIVE PROTEIN: CPT | Performed by: HOSPITALIST

## 2022-11-21 PROCEDURE — 250N000009 HC RX 250: Performed by: INTERNAL MEDICINE

## 2022-11-21 PROCEDURE — 84100 ASSAY OF PHOSPHORUS: CPT | Performed by: INTERNAL MEDICINE

## 2022-11-21 PROCEDURE — 87040 BLOOD CULTURE FOR BACTERIA: CPT | Performed by: INTERNAL MEDICINE

## 2022-11-21 PROCEDURE — 250N000013 HC RX MED GY IP 250 OP 250 PS 637: Performed by: EMERGENCY MEDICINE

## 2022-11-21 PROCEDURE — 250N000012 HC RX MED GY IP 250 OP 636 PS 637: Performed by: INTERNAL MEDICINE

## 2022-11-21 PROCEDURE — 250N000013 HC RX MED GY IP 250 OP 250 PS 637: Performed by: INTERNAL MEDICINE

## 2022-11-21 PROCEDURE — 258N000003 HC RX IP 258 OP 636: Performed by: INTERNAL MEDICINE

## 2022-11-21 PROCEDURE — 94640 AIRWAY INHALATION TREATMENT: CPT | Mod: 76

## 2022-11-21 PROCEDURE — 82306 VITAMIN D 25 HYDROXY: CPT | Performed by: INTERNAL MEDICINE

## 2022-11-21 PROCEDURE — 99223 1ST HOSP IP/OBS HIGH 75: CPT | Mod: AI | Performed by: INTERNAL MEDICINE

## 2022-11-21 RX ORDER — DEXTROSE MONOHYDRATE 25 G/50ML
25-50 INJECTION, SOLUTION INTRAVENOUS
Status: DISCONTINUED | OUTPATIENT
Start: 2022-11-21 | End: 2022-11-29 | Stop reason: HOSPADM

## 2022-11-21 RX ORDER — ACETAMINOPHEN 325 MG/1
650 TABLET ORAL EVERY 6 HOURS PRN
Status: DISCONTINUED | OUTPATIENT
Start: 2022-11-21 | End: 2022-11-29 | Stop reason: HOSPADM

## 2022-11-21 RX ORDER — LORAZEPAM 2 MG/ML
2 INJECTION INTRAMUSCULAR
Status: DISCONTINUED | OUTPATIENT
Start: 2022-11-21 | End: 2022-11-29 | Stop reason: HOSPADM

## 2022-11-21 RX ORDER — LEVOTHYROXINE SODIUM 75 UG/1
150 TABLET ORAL DAILY
Status: DISCONTINUED | OUTPATIENT
Start: 2022-11-21 | End: 2022-11-29 | Stop reason: HOSPADM

## 2022-11-21 RX ORDER — NALOXONE HYDROCHLORIDE 0.4 MG/ML
0.2 INJECTION, SOLUTION INTRAMUSCULAR; INTRAVENOUS; SUBCUTANEOUS
Status: DISCONTINUED | OUTPATIENT
Start: 2022-11-21 | End: 2022-11-29 | Stop reason: HOSPADM

## 2022-11-21 RX ORDER — PROCHLORPERAZINE MALEATE 10 MG
10 TABLET ORAL EVERY 6 HOURS PRN
Status: DISCONTINUED | OUTPATIENT
Start: 2022-11-21 | End: 2022-11-29 | Stop reason: HOSPADM

## 2022-11-21 RX ORDER — ALBUTEROL SULFATE 0.83 MG/ML
SOLUTION RESPIRATORY (INHALATION)
Status: COMPLETED
Start: 2022-11-21 | End: 2022-11-21

## 2022-11-21 RX ORDER — ACETYLCYSTEINE 200 MG/ML
2 SOLUTION ORAL; RESPIRATORY (INHALATION) 4 TIMES DAILY
Status: DISCONTINUED | OUTPATIENT
Start: 2022-11-21 | End: 2022-11-29 | Stop reason: HOSPADM

## 2022-11-21 RX ORDER — LORAZEPAM 2 MG/ML
1 INJECTION INTRAMUSCULAR ONCE
Status: COMPLETED | OUTPATIENT
Start: 2022-11-21 | End: 2022-11-21

## 2022-11-21 RX ORDER — BACITRACIN ZINC 500 [USP'U]/G
OINTMENT TOPICAL DAILY PRN
Status: DISCONTINUED | OUTPATIENT
Start: 2022-11-21 | End: 2022-11-29 | Stop reason: HOSPADM

## 2022-11-21 RX ORDER — DEXTROSE MONOHYDRATE 100 MG/ML
INJECTION, SOLUTION INTRAVENOUS CONTINUOUS PRN
Status: DISCONTINUED | OUTPATIENT
Start: 2022-11-21 | End: 2022-11-29 | Stop reason: HOSPADM

## 2022-11-21 RX ORDER — ALBUTEROL SULFATE 0.83 MG/ML
2.5 SOLUTION RESPIRATORY (INHALATION) 4 TIMES DAILY
Status: DISCONTINUED | OUTPATIENT
Start: 2022-11-21 | End: 2022-11-21

## 2022-11-21 RX ORDER — LIDOCAINE 40 MG/G
CREAM TOPICAL
Status: DISCONTINUED | OUTPATIENT
Start: 2022-11-21 | End: 2022-11-29 | Stop reason: HOSPADM

## 2022-11-21 RX ORDER — CARBAMAZEPINE 100 MG/5ML
150 SUSPENSION ORAL ONCE
Status: COMPLETED | OUTPATIENT
Start: 2022-11-21 | End: 2022-11-21

## 2022-11-21 RX ORDER — MIDODRINE HYDROCHLORIDE 5 MG/1
10 TABLET ORAL
Status: DISCONTINUED | OUTPATIENT
Start: 2022-11-21 | End: 2022-11-22

## 2022-11-21 RX ORDER — PIPERACILLIN SODIUM, TAZOBACTAM SODIUM 4; .5 G/20ML; G/20ML
4.5 INJECTION, POWDER, LYOPHILIZED, FOR SOLUTION INTRAVENOUS ONCE
Status: COMPLETED | OUTPATIENT
Start: 2022-11-21 | End: 2022-11-21

## 2022-11-21 RX ORDER — PROCHLORPERAZINE 25 MG
25 SUPPOSITORY, RECTAL RECTAL EVERY 12 HOURS PRN
Status: DISCONTINUED | OUTPATIENT
Start: 2022-11-21 | End: 2022-11-29 | Stop reason: HOSPADM

## 2022-11-21 RX ORDER — METOCLOPRAMIDE HYDROCHLORIDE 5 MG/5ML
10 SOLUTION ORAL
Status: DISCONTINUED | OUTPATIENT
Start: 2022-11-21 | End: 2022-11-25

## 2022-11-21 RX ORDER — NICOTINE POLACRILEX 4 MG
15-30 LOZENGE BUCCAL
Status: DISCONTINUED | OUTPATIENT
Start: 2022-11-21 | End: 2022-11-29 | Stop reason: HOSPADM

## 2022-11-21 RX ORDER — CARBAMAZEPINE 100 MG/5ML
150 SUSPENSION ORAL 3 TIMES DAILY
Status: DISCONTINUED | OUTPATIENT
Start: 2022-11-21 | End: 2022-11-29 | Stop reason: HOSPADM

## 2022-11-21 RX ORDER — ONDANSETRON 2 MG/ML
4 INJECTION INTRAMUSCULAR; INTRAVENOUS EVERY 6 HOURS PRN
Status: DISCONTINUED | OUTPATIENT
Start: 2022-11-21 | End: 2022-11-29 | Stop reason: HOSPADM

## 2022-11-21 RX ORDER — HYDROMORPHONE HCL IN WATER/PF 6 MG/30 ML
0.2 PATIENT CONTROLLED ANALGESIA SYRINGE INTRAVENOUS
Status: DISCONTINUED | OUTPATIENT
Start: 2022-11-21 | End: 2022-11-29 | Stop reason: HOSPADM

## 2022-11-21 RX ORDER — GUAIFENESIN 600 MG/1
1200 TABLET, EXTENDED RELEASE ORAL 2 TIMES DAILY PRN
Status: DISCONTINUED | OUTPATIENT
Start: 2022-11-21 | End: 2022-11-29 | Stop reason: HOSPADM

## 2022-11-21 RX ORDER — ALBUTEROL SULFATE 0.83 MG/ML
2.5 SOLUTION RESPIRATORY (INHALATION) 4 TIMES DAILY
Status: DISCONTINUED | OUTPATIENT
Start: 2022-11-21 | End: 2022-11-29 | Stop reason: HOSPADM

## 2022-11-21 RX ORDER — ONDANSETRON 4 MG/1
4 TABLET, ORALLY DISINTEGRATING ORAL EVERY 6 HOURS PRN
Status: DISCONTINUED | OUTPATIENT
Start: 2022-11-21 | End: 2022-11-29 | Stop reason: HOSPADM

## 2022-11-21 RX ORDER — ACETAMINOPHEN 650 MG/1
650 SUPPOSITORY RECTAL EVERY 6 HOURS PRN
Status: DISCONTINUED | OUTPATIENT
Start: 2022-11-21 | End: 2022-11-21

## 2022-11-21 RX ORDER — NALOXONE HYDROCHLORIDE 0.4 MG/ML
0.4 INJECTION, SOLUTION INTRAMUSCULAR; INTRAVENOUS; SUBCUTANEOUS
Status: DISCONTINUED | OUTPATIENT
Start: 2022-11-21 | End: 2022-11-29 | Stop reason: HOSPADM

## 2022-11-21 RX ORDER — ACETAMINOPHEN 650 MG/1
650 SUPPOSITORY RECTAL EVERY 6 HOURS PRN
Status: DISCONTINUED | OUTPATIENT
Start: 2022-11-21 | End: 2022-11-29 | Stop reason: HOSPADM

## 2022-11-21 RX ORDER — SODIUM CHLORIDE 9 MG/ML
INJECTION, SOLUTION INTRAVENOUS CONTINUOUS
Status: DISCONTINUED | OUTPATIENT
Start: 2022-11-21 | End: 2022-11-24

## 2022-11-21 RX ORDER — CALCIUM CARBONATE 1250 MG/5ML
1250 SUSPENSION ORAL 3 TIMES DAILY
Status: DISCONTINUED | OUTPATIENT
Start: 2022-11-21 | End: 2022-11-29 | Stop reason: HOSPADM

## 2022-11-21 RX ORDER — HYDROCORTISONE 5 MG/1
5-15 TABLET ORAL 2 TIMES DAILY
Status: DISCONTINUED | OUTPATIENT
Start: 2022-11-21 | End: 2022-11-25

## 2022-11-21 RX ORDER — CARBAMAZEPINE 100 MG/5ML
100 SUSPENSION ORAL DAILY
Status: DISCONTINUED | OUTPATIENT
Start: 2022-11-21 | End: 2022-11-29 | Stop reason: HOSPADM

## 2022-11-21 RX ORDER — ACETAMINOPHEN 325 MG/1
650 TABLET ORAL EVERY 6 HOURS PRN
Status: DISCONTINUED | OUTPATIENT
Start: 2022-11-21 | End: 2022-11-21

## 2022-11-21 RX ORDER — PIPERACILLIN SODIUM, TAZOBACTAM SODIUM 3; .375 G/15ML; G/15ML
3.38 INJECTION, POWDER, LYOPHILIZED, FOR SOLUTION INTRAVENOUS EVERY 6 HOURS
Status: DISCONTINUED | OUTPATIENT
Start: 2022-11-21 | End: 2022-11-27

## 2022-11-21 RX ORDER — CARBAMAZEPINE 100 MG/5ML
100 SUSPENSION ORAL DAILY
Status: DISCONTINUED | OUTPATIENT
Start: 2022-11-21 | End: 2022-11-21

## 2022-11-21 RX ORDER — DEXTROSE, SODIUM CHLORIDE, SODIUM LACTATE, POTASSIUM CHLORIDE, AND CALCIUM CHLORIDE 5; .6; .31; .03; .02 G/100ML; G/100ML; G/100ML; G/100ML; G/100ML
INJECTION, SOLUTION INTRAVENOUS CONTINUOUS
Status: DISCONTINUED | OUTPATIENT
Start: 2022-11-21 | End: 2022-11-21

## 2022-11-21 RX ADMIN — ALBUTEROL SULFATE 2.5 MG: 2.5 SOLUTION RESPIRATORY (INHALATION) at 11:48

## 2022-11-21 RX ADMIN — ACETYLCYSTEINE 2 ML: 200 SOLUTION ORAL; RESPIRATORY (INHALATION) at 11:48

## 2022-11-21 RX ADMIN — CARBAMAZEPINE 100 MG: 100 SUSPENSION ORAL at 19:04

## 2022-11-21 RX ADMIN — POTASSIUM & SODIUM PHOSPHATES POWDER PACK 280-160-250 MG 1 PACKET: 280-160-250 PACK at 23:48

## 2022-11-21 RX ADMIN — PIPERACILLIN AND TAZOBACTAM 4.5 G: 4; .5 INJECTION, POWDER, FOR SOLUTION INTRAVENOUS at 01:26

## 2022-11-21 RX ADMIN — CARBAMAZEPINE 150 MG: 100 SUSPENSION ORAL at 01:26

## 2022-11-21 RX ADMIN — SODIUM CHLORIDE, POTASSIUM CHLORIDE, SODIUM LACTATE AND CALCIUM CHLORIDE 1000 ML: 600; 310; 30; 20 INJECTION, SOLUTION INTRAVENOUS at 02:16

## 2022-11-21 RX ADMIN — MIDODRINE HYDROCHLORIDE 10 MG: 5 TABLET ORAL at 19:02

## 2022-11-21 RX ADMIN — PIPERACILLIN AND TAZOBACTAM 3.38 G: 3; .375 INJECTION, POWDER, FOR SOLUTION INTRAVENOUS at 20:26

## 2022-11-21 RX ADMIN — ALBUTEROL SULFATE 2.5 MG: 2.5 SOLUTION RESPIRATORY (INHALATION) at 19:50

## 2022-11-21 RX ADMIN — BRIVARACETAM 100 MG: 10 SOLUTION ORAL at 01:26

## 2022-11-21 RX ADMIN — CALCIUM CARBONATE 1250 MG: 1250 SUSPENSION ORAL at 21:55

## 2022-11-21 RX ADMIN — PIPERACILLIN AND TAZOBACTAM 3.38 G: 3; .375 INJECTION, POWDER, FOR SOLUTION INTRAVENOUS at 13:38

## 2022-11-21 RX ADMIN — SODIUM CHLORIDE 500 ML: 9 INJECTION, SOLUTION INTRAVENOUS at 14:35

## 2022-11-21 RX ADMIN — MIDODRINE HYDROCHLORIDE 10 MG: 5 TABLET ORAL at 14:28

## 2022-11-21 RX ADMIN — BRIVARACETAM 100 MG: 10 SOLUTION ORAL at 09:07

## 2022-11-21 RX ADMIN — LEVOTHYROXINE SODIUM 150 MCG: 150 TABLET ORAL at 09:05

## 2022-11-21 RX ADMIN — CARBAMAZEPINE 150 MG: 100 SUSPENSION ORAL at 12:10

## 2022-11-21 RX ADMIN — SODIUM CHLORIDE, SODIUM LACTATE, POTASSIUM CHLORIDE, CALCIUM CHLORIDE AND DEXTROSE MONOHYDRATE: 5; 600; 310; 30; 20 INJECTION, SOLUTION INTRAVENOUS at 11:29

## 2022-11-21 RX ADMIN — SODIUM CHLORIDE 500 ML: 9 INJECTION, SOLUTION INTRAVENOUS at 12:40

## 2022-11-21 RX ADMIN — INSULIN ASPART 2 UNITS: 100 INJECTION, SOLUTION INTRAVENOUS; SUBCUTANEOUS at 11:11

## 2022-11-21 RX ADMIN — ALBUTEROL SULFATE 2.5 MG: 2.5 SOLUTION RESPIRATORY (INHALATION) at 15:14

## 2022-11-21 RX ADMIN — SODIUM PHOSPHATE, MONOBASIC, MONOHYDRATE 15 MMOL: 276; 142 INJECTION, SOLUTION INTRAVENOUS at 14:36

## 2022-11-21 RX ADMIN — SODIUM CHLORIDE 1000 ML: 9 INJECTION, SOLUTION INTRAVENOUS at 03:39

## 2022-11-21 RX ADMIN — ACETYLCYSTEINE 2 ML: 200 SOLUTION ORAL; RESPIRATORY (INHALATION) at 15:14

## 2022-11-21 RX ADMIN — CALCIUM CARBONATE 1250 MG: 1250 SUSPENSION ORAL at 13:42

## 2022-11-21 RX ADMIN — LORAZEPAM 1 MG: 2 INJECTION INTRAMUSCULAR; INTRAVENOUS at 01:26

## 2022-11-21 RX ADMIN — ALBUMIN HUMAN 50 G: 0.05 INJECTION, SOLUTION INTRAVENOUS at 15:02

## 2022-11-21 RX ADMIN — HYDROCORTISONE SODIUM SUCCINATE 100 MG: 100 INJECTION, POWDER, FOR SOLUTION INTRAMUSCULAR; INTRAVENOUS at 20:25

## 2022-11-21 RX ADMIN — BRIVARACETAM 100 MG: 10 SOLUTION ORAL at 20:26

## 2022-11-21 RX ADMIN — HYDROCORTISONE SODIUM SUCCINATE 100 MG: 100 INJECTION, POWDER, FOR SOLUTION INTRAMUSCULAR; INTRAVENOUS at 12:03

## 2022-11-21 RX ADMIN — CALCIUM CARBONATE 1250 MG: 1250 SUSPENSION ORAL at 09:07

## 2022-11-21 RX ADMIN — PIPERACILLIN AND TAZOBACTAM 3.38 G: 3; .375 INJECTION, POWDER, FOR SOLUTION INTRAVENOUS at 08:02

## 2022-11-21 RX ADMIN — SODIUM CHLORIDE 1000 ML: 9 INJECTION, SOLUTION INTRAVENOUS at 11:10

## 2022-11-21 RX ADMIN — SODIUM CHLORIDE, PRESERVATIVE FREE: 5 INJECTION INTRAVENOUS at 19:04

## 2022-11-21 RX ADMIN — INSULIN ASPART 1 UNITS: 100 INJECTION, SOLUTION INTRAVENOUS; SUBCUTANEOUS at 14:49

## 2022-11-21 RX ADMIN — CARBAMAZEPINE 150 MG: 100 SUSPENSION ORAL at 23:48

## 2022-11-21 RX ADMIN — CARBAMAZEPINE 150 MG: 100 SUSPENSION ORAL at 08:02

## 2022-11-21 RX ADMIN — INSULIN ASPART 1 UNITS: 100 INJECTION, SOLUTION INTRAVENOUS; SUBCUTANEOUS at 23:48

## 2022-11-21 RX ADMIN — SODIUM CHLORIDE, SODIUM LACTATE, POTASSIUM CHLORIDE, CALCIUM CHLORIDE AND DEXTROSE MONOHYDRATE: 5; 600; 310; 30; 20 INJECTION, SOLUTION INTRAVENOUS at 03:46

## 2022-11-21 RX ADMIN — INSULIN ASPART 1 UNITS: 100 INJECTION, SOLUTION INTRAVENOUS; SUBCUTANEOUS at 20:00

## 2022-11-21 RX ADMIN — HYDROCORTISONE SODIUM SUCCINATE 100 MG: 100 INJECTION, POWDER, FOR SOLUTION INTRAMUSCULAR; INTRAVENOUS at 03:46

## 2022-11-21 RX ADMIN — PANTOPRAZOLE SODIUM 40 MG: 40 INJECTION, POWDER, FOR SOLUTION INTRAVENOUS at 09:02

## 2022-11-21 RX ADMIN — ACETYLCYSTEINE 2 ML: 200 SOLUTION ORAL; RESPIRATORY (INHALATION) at 19:50

## 2022-11-21 ASSESSMENT — ACTIVITIES OF DAILY LIVING (ADL)
ADLS_ACUITY_SCORE: 37
ADLS_ACUITY_SCORE: 39
ADLS_ACUITY_SCORE: 39
ADLS_ACUITY_SCORE: 37
ADLS_ACUITY_SCORE: 47
ADLS_ACUITY_SCORE: 37
ADLS_ACUITY_SCORE: 37
ADLS_ACUITY_SCORE: 47
ADLS_ACUITY_SCORE: 39
ADLS_ACUITY_SCORE: 39
ADLS_ACUITY_SCORE: 47
ADLS_ACUITY_SCORE: 39

## 2022-11-21 NOTE — ED NOTES
Access patient chart due to request for scheduled seizure medication by Dr Villavicencio. No seizure medications are on patient's MAR.  Patient's primary RN NANCI Ashraf aware.

## 2022-11-21 NOTE — PROGRESS NOTES
Admission    Patient arrives to room 611 via cart from ED.  Care plan note: patient arrived on 3 lpm nasal cannula.  VSS, afebrile.  Non verbal, eyes open, appears comfortable.  Incontinent of large amount of urine despite purwick in place.  Redness to R elbow and sacrum.  Gtube in place, clamped.    Inpatient nursing criteria listed below were met:    Did you put disposition on whiteboard and in sticky note: No  Full skin assessment done (add LDA if skin issue present) :Yes  Isolation education started/completed Yes  Patient allergies verified with patient: No, non verbal   Fall Risk? (Care plan updated, Education given and documented) Yes  Primary Care Plan initiated: Yes  Home medications documented in belongings flowsheet: NA  Patient belongings documented in belongings flowsheet: Yes  Reminder note (belongings/ medications) placed in discharge instructions:No  Admission profile/ required documentation complete: NA  If patient is a 72 hour hold/Commitment are belongings removed from room and locked up? NA

## 2022-11-21 NOTE — PHARMACY-ADMISSION MEDICATION HISTORY
Pharmacy Medication History  Admission medication history interview status for the 11/20/2022  admission is complete. See EPIC admission navigator for prior to admission medications     Location of Interview: Patient room  Medication history sources: Patient's family/friend (mother Savannah), Surescripts and Care Everywhere    Significant changes made to the medication list:  Added: mucinex  Removed: robitussin, scopolamine powder    In the past week, patient estimated taking medication this percent of the time: greater than 90%    Additional medication history information:   Updated med list and last doses as reported by Savannah (patient's mom), his legal guardian, who helps with meds.     Medication reconciliation completed by provider prior to medication history? No    Time spent in this activity: 45 min    Prior to Admission medications    Medication Sig Last Dose Taking? Auth Provider Long Term End Date   acetaminophen (TYLENOL) 650 MG CR tablet Take 650 mg by mouth every 8 hours as needed for mild pain or fever  at prn Yes Unknown, Entered By History     acetylcysteine (MUCOMYST) 20 % neb solution Take 2 mLs by nebulization 4 times daily With albuterol at 0700, 1100, 1500, and 1900  at prn Yes Unknown, Entered By History Yes    bacitracin ointment Apply topically daily as needed for wound care To PEG site.  at prn Yes Unknown, Entered By History     Brivaracetam (BRIVIACT) 10 MG/ML solution 100 mg by Oral or Feeding Tube route 2 times daily 0900, 2100 11/20/2022 Yes Unknown, Entered By History No    calcium carbonate 1250 MG/5ML SUSP suspension Take 1,250 mg by mouth 3 times daily 0900, 1500, 2100 11/20/2022 Yes Unknown, Entered By History     carBAMazepine (TEGRETOL) 100 MG/5ML suspension Take 100 mg by mouth daily Take at 1800 11/20/2022 Yes Unknown, Entered By History Yes    carBAMazepine (TEGRETOL) 100 MG/5ML suspension 150 mg by Oral or Feeding Tube route 3 times daily At 06:00, 12:00, and 24:00 for  seizures 11/20/2022 Yes Unknown, Entered By History Yes    guaiFENesin (MUCINEX) 600 MG 12 hr tablet Take 1,200 mg by mouth 2 times daily as needed for congestion 11/20/2022 at am Yes Unknown, Entered By History No    hydrocortisone (CORTAID) 1 % external cream Apply topically 2 times daily as needed Apply to reddened memo areas as needed  at prn Yes Unknown, Entered By History     hydrocortisone (CORTEF) 5 MG tablet Take 15 mg (3 tablets) in the morning and 5 mg (1 tablet)  at 2:00 PM. During illness patient takes more as a stress dose. Please increase the dose as directed. 11/20/2022 Yes Faizan Mclean MD Yes    levothyroxine (SYNTHROID/LEVOTHROID) 150 MCG tablet TAKE 1 TABLET(150 MCG) BY MOUTH DAILY 11/20/2022 Yes Faizan Mclean MD Yes    metoclopramide (REGLAN) 10 MG/10ML SOLN solution Take 10 mg by mouth 4 times daily (before meals and nightly) 0800, 1200, 1600, 2000  Disconnects bag before administration, then waits 45 mins before reconnecting after giving the medication 11/20/2022 Yes Unknown, Entered By History     multivitamin, therapeutic (THERA-VIT) TABS tablet Take 1 tablet by mouth daily 11/20/2022 at am Yes Reported, Patient     mupirocin (BACTROBAN) 2 % external ointment Apply topically 2 times daily as needed  at prn Yes Elsa Queen MD     pantoprazole (PROTONIX) 2 mg/mL SUSP suspension 20 mLs (40 mg) by Per J Tube route daily 11/20/2022 Yes Alvaro Barahona MD     testosterone cypionate (DEPOTESTOSTERONE) 200 MG/ML injection Inject 0.3 mLs (60 mg) into the muscle every 7 days New dose 11/13/2022 Yes Elsa Queen MD Yes    vitamin C (ASCORBIC ACID) 1000 MG TABS 1,000 mg by Oral or Feeding Tube route daily  11/20/2022 Yes Unknown, Entered By History     vitamin D3 (CHOLECALCIFEROL) 2000 units (50 mcg) tablet Take 2,000 Units by mouth daily Crush and feed via j-tube @@ 0900 11/20/2022 Yes Unknown, Entered By History No    albuterol (PROVENTIL) (5 MG/ML) 0.5% neb solution Take 2.5 mg  by nebulization every 4 hours (while awake) 0700 1100 1500 1900 with mucomyst  at prn  Unknown, Entered By History Yes        The information provided in this note is only as accurate as the sources available at the time of update(s)

## 2022-11-21 NOTE — ED TRIAGE NOTES
Fevers at home since this am.  Hiccups after his tube feeding.     Triage Assessment     Row Name 11/20/22 2049       Triage Assessment (Adult)    Airway WDL WDL       Respiratory WDL    Respiratory WDL WDL       Skin Circulation/Temperature WDL    Skin Circulation/Temperature WDL WDL       Cardiac WDL    Cardiac WDL WDL       Peripheral/Neurovascular WDL    Peripheral Neurovascular WDL WDL       Cognitive/Neuro/Behavioral WDL    Cognitive/Neuro/Behavioral WDL WDL
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
## 2022-11-21 NOTE — PLAN OF CARE
Goal Outcome Evaluation:      Summary:    DATE & TIME: 11/21/22 0830  Cognitive Concerns/ Orientation : unable; lethargic, non verbal, was more alert for short period of time when mother visited at 1430  BEHAVIOR & AGGRESSION TOOL COLOR: calm/cooperative  CIWA SCORE: NA  ABNL VS/O2: Low BP's starting at 1100, 60-70's systolic at times increasing to 80-90's systolic.  Last BP 98/51 after giving midodrine thru G tube and after bolus and now starting albumin infusions.  MOBILITY: bedrest/ turn and reposition q2h  PAIN MANAGMENT: appears without pain or discomfort  DIET: strict NPO; can start J tube feeding after completion of phosphorus replacement.  BOWEL/BLADDER: incontinent of urine on arrival from ED, and 200 ml darell urine this shift via external cath  ABNL LAB/BG: Phos 1.3 - being replaced IV now; lactic 3.0 slowly improving; wbc 12, Hgb 10.3, platelet 91; BC x 2 were drawn and pending.    DRAIN/DEVICES: PIV bilateral upper arms  TELEMETRY RHYTHM: NSR  SKIN: barely blanchable redness to coccyx/sacrum - mepilex applied and kept on side turning position; redness on R elbow on arrival, improving, mepilex applied.  GJtube left abdomen  TESTS/PROCEDURES: currently having cardiac echo; likely transfer to C due to low blood pressures.  D/C DAY/GOALS/PLACE: pending improvement  OTHER IMPORTANT INFO: total of 3 saline boluses (1000 ml, 500 ml, 500 ml) given.  Also has D5LR at 125/hr.  On IV Zosyn. Currently receiving albumin.   Productive cough, swallows. Lungs are diminished, on scheduled nebs; Sats stable on 2 lpm nasal cannula.

## 2022-11-21 NOTE — ED NOTES
Patient's mom has arrived.  Prior to her arrival, patient was coughing yellow thick mucus: suctioned with yaunker.
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
## 2022-11-21 NOTE — CONSULTS
[facility]  General Surgery Consultation         Mark Hummel MD, MD    Keyon Farias MRN# [id number]   YOB: 1960 Age: 62 year old      Date of Admission:  11/20/2022  Date of Consult: 11/21/2022         Assessment and Plan:   62-year-old gentleman with innumerable medical problems presents with worsening pneumonia, fever, vomiting.  Likely sepsis from pulmonary process.  Incidental bubbles of free air noted on CT of the abdomen.  Agree that this is likely due in some part to patient's gastrostomy tube.  Abdomen is completely benign nondistended nontender.  We will observe but no plans to intervene from a surgical standpoint.  Surgical co-morbities include spastic hemiplegia, epilepsy, hypopituitarism, GERD, GI bleed.            Code Status:   Full Code         Primary Care Physician:   Elsa Queen [phone]         Requesting Physician:      Dr. Villavicencio         Chief Complaint:   Abdominal free air    History is obtained from the patient         History of Present Illness:   Keyon Farias is a 62 year old male who presented with fever and worsening pulmonary status.  Patient comes to the emergency department frequently and is well-known.  He was noted to have bilateral pneumonia but CT scan images obtained through the abdomen revealed some bubbles of free air under the right hemidiaphragm.  Of note, this was also noted at a previous CT scan in December.  Patient has a feeding GJ tube but this had not been manipulated recently.           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         Past Surgical History:   Keyon Farias  has a past surgical history that includes orthopedic surgery; Head and neck surgery; vascular surgery; Laparoscopic appendectomy (7/30/2013); Esophagoscopy, gastroscopy, duodenoscopy (EGD), combined (3/13/2014); Tracheostomy (N/A, 9/3/2016); Laparoscopic assisted insertion tube gastrotomy (N/A, 9/7/2016); Tracheostomy (N/A, 12/2/2016); Esophagoscopy, gastroscopy, duodenoscopy (EGD), combined (N/A, 12/6/2016); Endoscopic ultrasound upper gastrointestinal tract (GI) (N/A, 1/30/2017); Endoscopic Ultrasound, Esophagoscopy, Gastroscopy, Duodenoscopy (Egd), Necrosectomy (N/A, 2/7/2017); Esophagoscopy, gastroscopy, duodenoscopy (EGD), combined (N/A, 2/7/2017); IR Gastro Jejunostomy Tube Change (12/20/2018); IR Gastro Jejunostomy Tube Change (2/4/2019); IR Follow Up Visit Inpatient (2/20/2019); IR Gastro Jejunostomy Tube Change (3/8/2019); IR Gastro Jejunostomy Tube Change (8/7/2019); IR PICC Exchange Left (8/15/2019); IR Gastro Jejunostomy Tube Change (1/13/2020); IR Gastro Jejunostomy Tube Change (1/30/2020); IR Gastro Jejunostomy Tube Change (6/24/2020); IR Gastro Jejunostomy Tube Change (9/17/2020); IR Gastro Jejunostomy Tube Change (10/14/2020); IR Gastro Jejunostomy Tube Change (2/16/2021); IR Gastro Jejunostomy Tube Change (5/6/2021); IR Gastro Jejunostomy Tube Change (5/25/2021); IR Gastro Jejunostomy Tube Change (7/26/2021); IR Gastro Jejunostomy Tube Change (9/29/2021); IR Gastro Jejunostomy Tube Change (11/16/2021); IR Gastro Jejunostomy Tube Change (3/18/2022); IR Gastro Jejunostomy Tube Change (6/8/2022); and IR Gastro Jejunostomy Tube Change (7/1/2022).         Home Medications:     Prior to Admission medications    Medication Sig Last Dose Taking? Auth Provider Long Term End Date    acetaminophen (TYLENOL) 650 MG CR tablet Take 650 mg by mouth every 8 hours as needed for mild pain or fever  at prn Yes Unknown, Entered By History     acetylcysteine (MUCOMYST) 20 % neb solution Take 2 mLs by nebulization 4 times daily With albuterol at 0700, 1100, 1500, and 1900  at prn Yes Unknown, Entered By History Yes    bacitracin ointment Apply topically daily as needed for wound care To PEG site.  at prn Yes Unknown, Entered By History     Brivaracetam (BRIVIACT) 10 MG/ML solution 100 mg by Oral or Feeding Tube route 2 times daily 0900, 2100 11/20/2022 Yes Unknown, Entered By History No    calcium carbonate 1250 MG/5ML SUSP suspension Take 1,250 mg by mouth 3 times daily 0900, 1500, 2100 11/20/2022 Yes Unknown, Entered By History     carBAMazepine (TEGRETOL) 100 MG/5ML suspension Take 100 mg by mouth daily Take at 1800 11/20/2022 Yes Unknown, Entered By History Yes    carBAMazepine (TEGRETOL) 100 MG/5ML suspension 150 mg by Oral or Feeding Tube route 3 times daily At 06:00, 12:00, and 24:00 for seizures 11/20/2022 Yes Unknown, Entered By History Yes    guaiFENesin (MUCINEX) 600 MG 12 hr tablet Take 1,200 mg by mouth 2 times daily as needed for congestion 11/20/2022 at am Yes Unknown, Entered By History No    hydrocortisone (CORTAID) 1 % external cream Apply topically 2 times daily as needed Apply to reddened memo areas as needed  at prn Yes Unknown, Entered By History     hydrocortisone (CORTEF) 5 MG tablet Take 15 mg (3 tablets) in the morning and 5 mg (1 tablet)  at 2:00 PM. During illness patient takes more as a stress dose. Please increase the dose as directed. 11/20/2022 Yes Faizan Mclean MD Yes    levothyroxine (SYNTHROID/LEVOTHROID) 150 MCG tablet TAKE 1 TABLET(150 MCG) BY MOUTH DAILY 11/20/2022 Yes Faizan Mclean MD Yes    metoclopramide (REGLAN) 10 MG/10ML SOLN solution Take 10 mg by mouth 4 times daily (before meals and nightly) 0800, 1200, 1600, 2000  Disconnects bag before  administration, then waits 45 mins before reconnecting after giving the medication 11/20/2022 Yes Unknown, Entered By History     multivitamin, therapeutic (THERA-VIT) TABS tablet Take 1 tablet by mouth daily 11/20/2022 at am Yes Reported, Patient     mupirocin (BACTROBAN) 2 % external ointment Apply topically 2 times daily as needed  at prn Yes Elsa Queen MD     pantoprazole (PROTONIX) 2 mg/mL SUSP suspension 20 mLs (40 mg) by Per J Tube route daily 11/20/2022 Yes Alvaro Barahona MD     testosterone cypionate (DEPOTESTOSTERONE) 200 MG/ML injection Inject 0.3 mLs (60 mg) into the muscle every 7 days New dose 11/13/2022 Yes Elsa Queen MD Yes    vitamin C (ASCORBIC ACID) 1000 MG TABS 1,000 mg by Oral or Feeding Tube route daily  11/20/2022 Yes Unknown, Entered By History     vitamin D3 (CHOLECALCIFEROL) 2000 units (50 mcg) tablet Take 2,000 Units by mouth daily Crush and feed via j-tube @@ 0900 11/20/2022 Yes Unknown, Entered By History No    albuterol (PROVENTIL) (5 MG/ML) 0.5% neb solution Take 2.5 mg by nebulization every 4 hours (while awake) 0700 1100 1500 1900 with mucomyst  at prn  Unknown, Entered By History Yes             Current Medications:           acetylcysteine  2 mL Nebulization 4x Daily     albuterol  2.5 mg Nebulization 4x Daily     Brivaracetam  100 mg Per Feeding Tube BID     calcium carbonate  1,250 mg Per Feeding Tube TID     carBAMazepine  100 mg Per Feeding Tube Daily     carBAMazepine  150 mg Per Feeding Tube TID     [Held by provider] hydrocortisone  5-15 mg Per Feeding Tube BID     hydrocortisone sodium succinate PF  100 mg Intravenous Q8H     insulin aspart  1-6 Units Subcutaneous Q4H     levothyroxine  150 mcg Per Feeding Tube Daily     [Held by provider] metoclopramide  10 mg Oral 4x Daily AC & HS     pantoprazole  40 mg Intravenous Daily with breakfast     piperacillin-tazobactam  3.375 g Intravenous Q6H     sodium chloride (PF)  3 mL Intracatheter Q8H     acetaminophen  **OR** acetaminophen, bacitracin, glucose **OR** dextrose **OR** glucagon, guaiFENesin, HYDROmorphone, lidocaine 4%, lidocaine (buffered or not buffered), LORazepam, melatonin, ondansetron **OR** ondansetron, prochlorperazine **OR** prochlorperazine **OR** prochlorperazine, sodium chloride (PF)         Allergies:     Allergies   Allergen Reactions     Valproic Acid Other (See Comments)     Toxicity w/ bone marrow suspension, elevated ammonia levels      Dilantin [Phenytoin Sodium] Other (See Comments)     Severe Trembling     Scopolamine Hives     Hives with the patch - oral no problem            Social History:   Keyon Farias  reports that he quit smoking about 33 years ago. He has never used smokeless tobacco. He reports that he does not drink alcohol and does not use drugs.          Family History:   Keyon Farias family history includes Diabetes Type 2  in his maternal grandmother; Hypertension in his father; Kidney Cancer in his father; Pulmonary Embolism in his mother.          Review of Systems:   The patient is nonverbal           Physical Exam:   Blood pressure 96/53, pulse 94, temperature 98.7  F (37.1  C), temperature source Axillary, resp. rate 20, height 1.829 m (6'), weight 71.3 kg (157 lb 3 oz), SpO2 96 %.  157 lbs 3.01 oz    Contracted malnourished gentleman, noncommunicative  Pupils equal round and reactive to light.   No cervical lymphadenopathy or thyromegaly.   Lung fields clear, breathing comfortably.   Heart normal sinus rhythm.  No murmurs rubs or gallops.  Abdomen soft, nontender, nondistended.  Relatively large bore GJ tube in place, no swelling or drainage around tube.  Skin warm, dry.  No obvious rashes or lesions.           Data:   All new lab and imaging data was reviewed.  CT scan shows some isolated bubbles of free air but no evidence for intra-abdominal fluid collections, edema, or bowel thickening  Recent Labs   Lab Test 11/20/22 2053 07/05/22  0035 06/18/22  0327 06/16/22  1308    WBC 9.9 8.4   < >  --    HGB 15.6 12.2*   < >  --    MCV 88 89   < >  --     264   < >  --    INR  --  1.15  --  1.22*    < > = values in this interval not displayed.      Recent Labs   Lab Test 11/21/22  0720 11/21/22 0342 11/20/22 2053 07/05/22  0035   NA  --   --  140 132*   POTASSIUM  --   --  3.5 4.6   CHLORIDE  --   --  102 98   CO2  --   --  31 30   BUN  --   --  26 22   CR  --   --  0.99 0.78   ANIONGAP  --   --  7 4   STEVE  --   --  9.2 8.8   * 113* 136* 102*
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
## 2022-11-21 NOTE — CONSULTS
CLINICAL NUTRITION SERVICES  -  ASSESSMENT NOTE      RECOMMENDATIONS FOR MD/PROVIDER TO ORDER:   Sticky note left to address vitamins/supplements per Savannah request    Recommendations Ordered by Registered Dietitian (RD):   Plan to resume home TF regimen as outlined below once Phos replaced and >/=1.9~  Jevity 1.5 at 60 mL/hr x 22 hours per day (holding TF 1 hour before and 1 hour after Synthroid dose in AM)- 1980 kcal (27 kcal/kg), 84 g protein (1.2 g/kg), 28 g fiber, 1003 mL H2O  FWF- 100 mL q 4 hours      Check Vitamin D + Zinc level per Mother request    Malnutrition:   % Weight Loss:  None noted  % Intake:  <75% for >/= 1 month (moderate malnutrition) - TF given below prescribed amount  Subcutaneous Fat Loss:  Some baseline moderate depletion, this has remained stable w/o changes   Muscle Loss: Some baseline moderate depletion, this has remained stable w/o changes  Fluid Retention:  None noted    Malnutrition Diagnosis: Patient does not meet two of the above criteria necessary for diagnosing malnutrition at this time but remains at risk        REASON FOR ASSESSMENT  Keyon Farias is a 62 year old male seen by Registered Dietitian for Provider Order - Registered Dietitian to Assess and Order TF per Medical Nutrition Therapy Protocol      NUTRITION HISTORY  Chart reviewed and discussed with patients mother/mira over the phone  Patient well known to nutrition services from multiple hospitalizations and baseline J-tube needs   Rate and formula have been consistent and stable for ~1 year   Last seen here by the RDs this past summer Rabia-July 2022   He does not meet criteria for malnutrition   Resides with mother Savannah who is care giver   Relies soley on FT for nutrition and hydration     Today, Savannah reports that there have been a few changes PTA in regards to TF/nutrition at home   Recently she is running TF Jevity 1.5 at 65 mL/hr from 10am-12am (14 hrs) which is ~4 cartons/day. He is prescribed 5  "cartons/day and while she would like to give this amount to him, pt has been wanting TF off overnight and she does not think that he would tolerate a higher rate during the day. She administers ~1000 mL free water through out the day   Jevity 1.5 at 65 mL/hr x 14 hrs provides 1335 kcal, 58 g protein, 196 g CHO, 19 g fiber and 962 mL free water (which meets 75% estimated energy needs and 68% estimated protein needs)  Savannah states that pts phlegm and mucus builds up a lot which has made him vomit - she believes this might be related to his TF formula but eventually states that the secretions and emesis are clear and mucus like, not comparable to TF product (GJ tube in good positioning via CT done yesterday)  He is unable to be weighed at home but Savannah reports UBW is between 157-165 lbs   BMs have been regular, stooling every 1-3 days of brown semi formed stool  She also reports he gets a \"high potency\" multivitamin + Vitamin C daily (2 capsules at 1000 mg foams up when mixed with \"something else\" + Vitamin D 2000 UIs + \"[employer]\" vitamin x1 capsule/day + salt tablets (sodium bicarbonate) 2 in AM and 1 in PM + Pedialyte for electrolytes (replace 60 mL free water for a flush) + Phos/Nek packets 3/day (not covered by insurance). She is asking if she should give quercetin for immune support based on a recommendation from her daughter that works in alternative medicine   No known food allergies     CURRENT NUTRITION ORDERS  Diet Order:     NPO    Current Intake/Tolerance:  N/A, plan to resume FT today     CT done last night ~  IMPRESSION:   1.  Right middle and lower lobe pneumonia, which may relate to aspiration given history of vomiting.  2.  Free air in the right upper quadrant. While there are multiple loops of bowel in the right upper quadrant, there is no obvious bowel wall thickening to indicate source of perforation. The free air is in a similar location to the prior CT. A   gastrojejunostomy " "tube is present though is well-positioned and there is no gas associated with the tube.  3.  Interval enlargement of cystic lesion of the pancreatic tail which measures 2.4 cm on today's study. This could be evaluated further with MRI on an outpatient basis.  4.  Thickening of the bladder wall lumen likely reflecting under distention. Gas within the bladder lumen is of indeterminate significance. Recommend clinical correlation.      NUTRITION FOCUSED PHYSICAL ASSESSMENT FOR DIAGNOSING MALNUTRITION)  No:  Unavailable today          Observed:    Deferred   Reviewed previous RD notes (see malnutrition section)     Obtained from Chart/Interdisciplinary Team:  None noted     ANTHROPOMETRICS  Height: 6' 0\"  Weight: 157 lbs 3.01 oz  Body mass index is 21.32 kg/m .  Weight Status:  Normal BMI  IBW: 81 kg   % IBW: 88%  Weight History: Potential for some weight loss 5 months ago, however, wt now trending back up to UBW ~160s   Wt Readings from Last 10 Encounters:   11/21/22 71.3 kg (157 lb 3 oz)   06/27/22 69 kg (152 lb 1.9 oz)   06/07/22 71 kg (156 lb 8 oz)   05/11/22 68.1 kg (150 lb 3.2 oz)   03/18/22 72.6 kg (160 lb)   12/11/21 75.2 kg (165 lb 12.6 oz)   11/17/21 73.6 kg (162 lb 3.2 oz)   10/30/21 75.2 kg (165 lb 12.6 oz)   08/10/21 78.8 kg (173 lb 11.6 oz)   05/24/21 80.1 kg (176 lb 8 oz)       LABS  Labs reviewed  Recent Labs   Lab 11/21/22  1055 11/21/22  0720 11/21/22  0342 11/20/22  2053   * 181* 113* 136*     K 3.5  No Mg/Phos - add-on's ordered     MEDICATIONS  Medications reviewed  Levothyroxine daily in AM  Reglan ordered to start   D5 IVF at 125 mL/hr = 150 g CHO, 510 kcal - may be contributing to low phos, potentially at refeeding risk?     ASSESSED NUTRITION NEEDS PER APPROVED PRACTICE GUIDELINES:    Dosing Weight 71 kg   Estimated Energy Needs: 0229-3703 kcals (25-30 Kcal/Kg)  Justification: maintenance  Estimated Protein Needs:  grams protein (1.2-1.5 g pro/Kg)  Justification: " maintenance  Estimated Fluid Needs: 1 mL/kcal   Justification: maintenance    MALNUTRITION:  % Weight Loss:  None noted  % Intake:  <75% for >/= 1 month (moderate malnutrition) - TF given below prescribed amount  Subcutaneous Fat Loss:  Some baseline moderate depletion, this has remained stable w/o changes   Muscle Loss: Some baseline moderate depletion, this has remained stable w/o changes  Fluid Retention:  None noted    Malnutrition Diagnosis: Patient does not meet two of the above criteria necessary for diagnosing malnutrition at this time but remains at risk    NUTRITION DIAGNOSIS:  Inadequate protein-energy intake related to TF held for recent admission to hospital as evidenced by receiving only 510 kcal and 0 g protein from D5 IVF alone       NUTRITION INTERVENTIONS  Recommendations / Nutrition Prescription  Resume home TF regimen above when appropriate   Jevity 1.5 at 60 mL/hr x 22 hours per day (holding TF 1 hour before and 1 hour after Synthroid dose in AM)- 1980 kcal (27 kcal/kg), 84 g protein (1.2 g/kg), 28 g fiber, 1003 mL H2O  FWF- 100 mL q 4 hours      Check Vitamin D + Zinc level per Mother request     Implementation  Nutrition education: No education needs assessed at this time  EN Composition, EN Schedule and Feeding Tube Flush: as above       Nutrition Goals  EN to meet % estimated needs within 24 hrs       MONITORING AND EVALUATION:  Progress towards goals will be monitored and evaluated per protocol and Practice Guidelines        Yanely Jefferson RD, LD  Clinical Dietitian   Un[facility] , [facility], Ortho, Ortho spine  Pager: [phone]

## 2022-11-21 NOTE — ED NOTES
[facility]  ED Nurse Handoff Report    ED Chief complaint: Fever      ED Diagnosis:   Final diagnoses:   None       Code Status: See MD notes    Allergies:   Allergies   Allergen Reactions    Valproic Acid Other (See Comments)     Toxicity w/ bone marrow suspension, elevated ammonia levels     Dilantin [Phenytoin Sodium] Other (See Comments)     Severe Trembling    Scopolamine Hives     Hives with the patch - oral no problem       Patient Story: Feverish at home per mom.  Some coughing with yellow mucus in ER.    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               Family Comments: Mom is here.    OBS brochure/video discussed/provided to patient/family: No              Name of person given brochure if not patient: na              Relationship to patient: na    For the majority of the shift this patient's behavior was Green.   Behavioral interventions performed were encouragement.    ED NURSE PHONE NUMBER: ER

## 2022-11-21 NOTE — ED NOTES
Bed: ED08  Expected date: 11/20/22  Expected time: 8:40 PM  Means of arrival: Ambulance  Comments:  Kerri 1 62M fever
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
## 2022-11-22 LAB
ALBUMIN SERPL-MCNC: 2.8 G/DL (ref 3.4–5)
ALBUMIN SERPL-MCNC: 2.8 G/DL (ref 3.4–5)
ALP SERPL-CCNC: 59 U/L (ref 40–150)
ALP SERPL-CCNC: 60 U/L (ref 40–150)
ALT SERPL W P-5'-P-CCNC: 22 U/L (ref 0–70)
ALT SERPL W P-5'-P-CCNC: 22 U/L (ref 0–70)
ANION GAP SERPL CALCULATED.3IONS-SCNC: 5 MMOL/L (ref 3–14)
ANION GAP SERPL CALCULATED.3IONS-SCNC: 6 MMOL/L (ref 3–14)
AST SERPL W P-5'-P-CCNC: 22 U/L (ref 0–45)
AST SERPL W P-5'-P-CCNC: 24 U/L (ref 0–45)
BACTERIA UR CULT: NO GROWTH
BILIRUB SERPL-MCNC: 0.2 MG/DL (ref 0.2–1.3)
BILIRUB SERPL-MCNC: 0.3 MG/DL (ref 0.2–1.3)
BUN SERPL-MCNC: 13 MG/DL (ref 7–30)
BUN SERPL-MCNC: 15 MG/DL (ref 7–30)
CALCIUM SERPL-MCNC: 7.6 MG/DL (ref 8.5–10.1)
CALCIUM SERPL-MCNC: 7.7 MG/DL (ref 8.5–10.1)
CHLORIDE BLD-SCNC: 113 MMOL/L (ref 94–109)
CHLORIDE BLD-SCNC: 115 MMOL/L (ref 94–109)
CO2 SERPL-SCNC: 25 MMOL/L (ref 20–32)
CO2 SERPL-SCNC: 27 MMOL/L (ref 20–32)
CREAT SERPL-MCNC: 0.78 MG/DL (ref 0.66–1.25)
CREAT SERPL-MCNC: 0.86 MG/DL (ref 0.66–1.25)
ENTEROCOCCUS FAECALIS: NOT DETECTED
ENTEROCOCCUS FAECIUM: NOT DETECTED
ERYTHROCYTE [DISTWIDTH] IN BLOOD BY AUTOMATED COUNT: 13.9 % (ref 10–15)
ERYTHROCYTE [DISTWIDTH] IN BLOOD BY AUTOMATED COUNT: 14.1 % (ref 10–15)
GFR SERPL CREATININE-BSD FRML MDRD: >90 ML/MIN/1.73M2
GFR SERPL CREATININE-BSD FRML MDRD: >90 ML/MIN/1.73M2
GLUCOSE BLD-MCNC: 176 MG/DL (ref 70–99)
GLUCOSE BLD-MCNC: 219 MG/DL (ref 70–99)
GLUCOSE BLDC GLUCOMTR-MCNC: 158 MG/DL (ref 70–99)
GLUCOSE BLDC GLUCOMTR-MCNC: 164 MG/DL (ref 70–99)
GLUCOSE BLDC GLUCOMTR-MCNC: 167 MG/DL (ref 70–99)
GLUCOSE BLDC GLUCOMTR-MCNC: 182 MG/DL (ref 70–99)
GLUCOSE BLDC GLUCOMTR-MCNC: 189 MG/DL (ref 70–99)
HCT VFR BLD AUTO: 31.1 % (ref 40–53)
HCT VFR BLD AUTO: 31.2 % (ref 40–53)
HGB BLD-MCNC: 10 G/DL (ref 13.3–17.7)
HGB BLD-MCNC: 9.9 G/DL (ref 13.3–17.7)
LACTATE SERPL-SCNC: 2.2 MMOL/L (ref 0.7–2)
LACTATE SERPL-SCNC: 3.1 MMOL/L (ref 0.7–2)
LACTATE SERPL-SCNC: 3.1 MMOL/L (ref 0.7–2)
LACTATE SERPL-SCNC: 4.7 MMOL/L (ref 0.7–2)
LISTERIA SPECIES (DETECTED/NOT DETECTED): NOT DETECTED
MAGNESIUM SERPL-MCNC: 2 MG/DL (ref 1.6–2.3)
MCH RBC QN AUTO: 28.4 PG (ref 26.5–33)
MCH RBC QN AUTO: 28.5 PG (ref 26.5–33)
MCHC RBC AUTO-ENTMCNC: 31.8 G/DL (ref 31.5–36.5)
MCHC RBC AUTO-ENTMCNC: 32.1 G/DL (ref 31.5–36.5)
MCV RBC AUTO: 89 FL (ref 78–100)
MCV RBC AUTO: 89 FL (ref 78–100)
PHOSPHATE SERPL-MCNC: 2.1 MG/DL (ref 2.5–4.5)
PLATELET # BLD AUTO: 78 10E3/UL (ref 150–450)
PLATELET # BLD AUTO: 89 10E3/UL (ref 150–450)
POTASSIUM BLD-SCNC: 3.2 MMOL/L (ref 3.4–5.3)
POTASSIUM BLD-SCNC: 3.5 MMOL/L (ref 3.4–5.3)
PROT SERPL-MCNC: 6 G/DL (ref 6.8–8.8)
PROT SERPL-MCNC: 6.1 G/DL (ref 6.8–8.8)
RBC # BLD AUTO: 3.48 10E6/UL (ref 4.4–5.9)
RBC # BLD AUTO: 3.51 10E6/UL (ref 4.4–5.9)
SODIUM SERPL-SCNC: 144 MMOL/L (ref 133–144)
SODIUM SERPL-SCNC: 147 MMOL/L (ref 133–144)
STAPHYLOCOCCUS AUREUS: NOT DETECTED
STAPHYLOCOCCUS EPIDERMIDIS: NOT DETECTED
STAPHYLOCOCCUS LUGDUNENSIS: NOT DETECTED
STAPHYLOCOCCUS SPECIES: NOT DETECTED
STREPTOCOCCUS AGALACTIAE: NOT DETECTED
STREPTOCOCCUS ANGINOSUS GROUP: NOT DETECTED
STREPTOCOCCUS PNEUMONIAE: NOT DETECTED
STREPTOCOCCUS PYOGENES: NOT DETECTED
STREPTOCOCCUS SPECIES: NOT DETECTED
WBC # BLD AUTO: 10.3 10E3/UL (ref 4–11)
WBC # BLD AUTO: 9.2 10E3/UL (ref 4–11)

## 2022-11-22 PROCEDURE — 84100 ASSAY OF PHOSPHORUS: CPT | Performed by: HOSPITALIST

## 2022-11-22 PROCEDURE — 83605 ASSAY OF LACTIC ACID: CPT | Performed by: HOSPITALIST

## 2022-11-22 PROCEDURE — 99232 SBSQ HOSP IP/OBS MODERATE 35: CPT | Performed by: HOSPITALIST

## 2022-11-22 PROCEDURE — 83735 ASSAY OF MAGNESIUM: CPT | Performed by: HOSPITALIST

## 2022-11-22 PROCEDURE — 94640 AIRWAY INHALATION TREATMENT: CPT

## 2022-11-22 PROCEDURE — 94640 AIRWAY INHALATION TREATMENT: CPT | Mod: 76

## 2022-11-22 PROCEDURE — 250N000009 HC RX 250: Performed by: INTERNAL MEDICINE

## 2022-11-22 PROCEDURE — C9113 INJ PANTOPRAZOLE SODIUM, VIA: HCPCS | Performed by: INTERNAL MEDICINE

## 2022-11-22 PROCEDURE — 250N000013 HC RX MED GY IP 250 OP 250 PS 637: Performed by: EMERGENCY MEDICINE

## 2022-11-22 PROCEDURE — 250N000013 HC RX MED GY IP 250 OP 250 PS 637: Performed by: HOSPITALIST

## 2022-11-22 PROCEDURE — 84450 TRANSFERASE (AST) (SGOT): CPT | Performed by: HOSPITALIST

## 2022-11-22 PROCEDURE — 999N000157 HC STATISTIC RCP TIME EA 10 MIN

## 2022-11-22 PROCEDURE — 258N000003 HC RX IP 258 OP 636: Performed by: HOSPITALIST

## 2022-11-22 PROCEDURE — 250N000011 HC RX IP 250 OP 636: Performed by: INTERNAL MEDICINE

## 2022-11-22 PROCEDURE — 36415 COLL VENOUS BLD VENIPUNCTURE: CPT | Performed by: INTERNAL MEDICINE

## 2022-11-22 PROCEDURE — 250N000013 HC RX MED GY IP 250 OP 250 PS 637: Performed by: INTERNAL MEDICINE

## 2022-11-22 PROCEDURE — 36415 COLL VENOUS BLD VENIPUNCTURE: CPT | Performed by: HOSPITALIST

## 2022-11-22 PROCEDURE — 120N000001 HC R&B MED SURG/OB

## 2022-11-22 PROCEDURE — 84155 ASSAY OF PROTEIN SERUM: CPT | Performed by: HOSPITALIST

## 2022-11-22 PROCEDURE — 99231 SBSQ HOSP IP/OBS SF/LOW 25: CPT | Performed by: SURGERY

## 2022-11-22 PROCEDURE — 85027 COMPLETE CBC AUTOMATED: CPT | Performed by: HOSPITALIST

## 2022-11-22 PROCEDURE — 83605 ASSAY OF LACTIC ACID: CPT | Performed by: INTERNAL MEDICINE

## 2022-11-22 RX ADMIN — ALBUTEROL SULFATE 2.5 MG: 2.5 SOLUTION RESPIRATORY (INHALATION) at 19:28

## 2022-11-22 RX ADMIN — SODIUM CHLORIDE 500 ML: 9 INJECTION, SOLUTION INTRAVENOUS at 17:00

## 2022-11-22 RX ADMIN — HYDROCORTISONE SODIUM SUCCINATE 100 MG: 100 INJECTION, POWDER, FOR SOLUTION INTRAMUSCULAR; INTRAVENOUS at 03:55

## 2022-11-22 RX ADMIN — POTASSIUM & SODIUM PHOSPHATES POWDER PACK 280-160-250 MG 1 PACKET: 280-160-250 PACK at 03:55

## 2022-11-22 RX ADMIN — INSULIN ASPART 1 UNITS: 100 INJECTION, SOLUTION INTRAVENOUS; SUBCUTANEOUS at 20:07

## 2022-11-22 RX ADMIN — POTASSIUM & SODIUM PHOSPHATES POWDER PACK 280-160-250 MG 1 PACKET: 280-160-250 PACK at 08:15

## 2022-11-22 RX ADMIN — ACETYLCYSTEINE 2 ML: 200 SOLUTION ORAL; RESPIRATORY (INHALATION) at 11:25

## 2022-11-22 RX ADMIN — PIPERACILLIN AND TAZOBACTAM 3.38 G: 3; .375 INJECTION, POWDER, FOR SOLUTION INTRAVENOUS at 13:46

## 2022-11-22 RX ADMIN — CALCIUM CARBONATE 1250 MG: 1250 SUSPENSION ORAL at 13:59

## 2022-11-22 RX ADMIN — PIPERACILLIN AND TAZOBACTAM 3.38 G: 3; .375 INJECTION, POWDER, FOR SOLUTION INTRAVENOUS at 20:17

## 2022-11-22 RX ADMIN — ALBUTEROL SULFATE 2.5 MG: 2.5 SOLUTION RESPIRATORY (INHALATION) at 07:03

## 2022-11-22 RX ADMIN — INSULIN ASPART 1 UNITS: 100 INJECTION, SOLUTION INTRAVENOUS; SUBCUTANEOUS at 13:29

## 2022-11-22 RX ADMIN — CARBAMAZEPINE 100 MG: 100 SUSPENSION ORAL at 17:03

## 2022-11-22 RX ADMIN — CALCIUM CARBONATE 1250 MG: 1250 SUSPENSION ORAL at 20:13

## 2022-11-22 RX ADMIN — HYDROCORTISONE SODIUM SUCCINATE 100 MG: 100 INJECTION, POWDER, FOR SOLUTION INTRAMUSCULAR; INTRAVENOUS at 20:31

## 2022-11-22 RX ADMIN — HYDROCORTISONE SODIUM SUCCINATE 100 MG: 100 INJECTION, POWDER, FOR SOLUTION INTRAMUSCULAR; INTRAVENOUS at 13:34

## 2022-11-22 RX ADMIN — CARBAMAZEPINE 150 MG: 100 SUSPENSION ORAL at 13:35

## 2022-11-22 RX ADMIN — LEVOTHYROXINE SODIUM 150 MCG: 150 TABLET ORAL at 09:33

## 2022-11-22 RX ADMIN — ACETYLCYSTEINE 2 ML: 200 SOLUTION ORAL; RESPIRATORY (INHALATION) at 07:03

## 2022-11-22 RX ADMIN — PANTOPRAZOLE SODIUM 40 MG: 40 INJECTION, POWDER, FOR SOLUTION INTRAVENOUS at 08:19

## 2022-11-22 RX ADMIN — CALCIUM CARBONATE 1250 MG: 1250 SUSPENSION ORAL at 09:34

## 2022-11-22 RX ADMIN — SODIUM CHLORIDE, PRESERVATIVE FREE: 5 INJECTION INTRAVENOUS at 06:46

## 2022-11-22 RX ADMIN — ACETYLCYSTEINE 2 ML: 200 SOLUTION ORAL; RESPIRATORY (INHALATION) at 19:28

## 2022-11-22 RX ADMIN — BRIVARACETAM 100 MG: 10 SOLUTION ORAL at 20:57

## 2022-11-22 RX ADMIN — INSULIN ASPART 1 UNITS: 100 INJECTION, SOLUTION INTRAVENOUS; SUBCUTANEOUS at 04:01

## 2022-11-22 RX ADMIN — INSULIN ASPART 1 UNITS: 100 INJECTION, SOLUTION INTRAVENOUS; SUBCUTANEOUS at 08:35

## 2022-11-22 RX ADMIN — CARBAMAZEPINE 150 MG: 100 SUSPENSION ORAL at 05:56

## 2022-11-22 RX ADMIN — INSULIN ASPART 1 UNITS: 100 INJECTION, SOLUTION INTRAVENOUS; SUBCUTANEOUS at 16:14

## 2022-11-22 RX ADMIN — ALBUTEROL SULFATE 2.5 MG: 2.5 SOLUTION RESPIRATORY (INHALATION) at 11:25

## 2022-11-22 RX ADMIN — PIPERACILLIN AND TAZOBACTAM 3.38 G: 3; .375 INJECTION, POWDER, FOR SOLUTION INTRAVENOUS at 08:15

## 2022-11-22 RX ADMIN — PIPERACILLIN AND TAZOBACTAM 3.38 G: 3; .375 INJECTION, POWDER, FOR SOLUTION INTRAVENOUS at 02:10

## 2022-11-22 RX ADMIN — BRIVARACETAM 100 MG: 10 SOLUTION ORAL at 09:30

## 2022-11-22 ASSESSMENT — ACTIVITIES OF DAILY LIVING (ADL)
ADLS_ACUITY_SCORE: 51
ADLS_ACUITY_SCORE: 55
ADLS_ACUITY_SCORE: 51
ADLS_ACUITY_SCORE: 51
ADLS_ACUITY_SCORE: 57
ADLS_ACUITY_SCORE: 51
ADLS_ACUITY_SCORE: 51
ADLS_ACUITY_SCORE: 57
ADLS_ACUITY_SCORE: 51
ADLS_ACUITY_SCORE: 53
ADLS_ACUITY_SCORE: 49
ADLS_ACUITY_SCORE: 51

## 2022-11-22 NOTE — PROVIDER NOTIFICATION
2320 MD notified: HR dropping into 40s while pt is sleeping. Lactic acid trending up. --- HR OK and recheck lactic now.     0055 MD paged: FYI, lactic acid down to 3.1. Thanks! -- Lactic recheck ordered for AM.     0200 MD notified: Verigene panel resulted, nothing detected at this time. Thanks!

## 2022-11-22 NOTE — PLAN OF CARE
2813-2023 Received pt from ICU    Alert, orientation HECTOR d/t baseline aphasia. Follows commands. Tele SB/SR. VSS on RA, sats mid 90s, monitor for HOTN. Oral secretions, suction as tolerated, pt refusing at times.   PEG tube in place, TF running @ 60ml/hr goal rate, 100ml free water flush Q4H. NS @ 100ml/hr. Inct B&B, external cath in place. BM x 1, brown, loose. Redness blanchable on sacrum/buttocks, mepilex in place. Denies pain, rFLACC 0. Bedrest w/ assist x 2 + lift.   Lactic acid 4.7 @ 1444, 500ml bolus given per MD and full set of labs to be drawn @ 1900.  Phos replaced in AM, recheck tomorrow.   Continue to monitor.
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
## 2022-11-22 NOTE — PROGRESS NOTES
[facility] note    Reviewed the CT and pancreatic cyst findings with Dr. Atwood. He reviewed the images. He does not feel further work-up or imaging with MRI or EUS needed, likely simple pseudocyst based on features.    We will sign off, please call with any questions or concerns.    Anthony Hernandez MD   [facility]  [phone]

## 2022-11-22 NOTE — PROGRESS NOTES
X-cover    Blood cx x 1 with GP cocci, not further described. On zosyn, await verigene.     Regino Valenzuela MD    Verigene negative. Continue current management.     Regino Valenzuela MD

## 2022-11-22 NOTE — PROGRESS NOTES
.Transfer in/out    Transfer to ICU from [address]  Report given to/received from given to Katie Hollins RN    Brief note about patient status upon transfer      Code status: Full Code  Skin: redness blanchable sacrum/coccyx and left elbow, mepilex in place, G/J tube in abdomen  Fall Risk: Yes- Department fall risk interventions implemented.  Isolation: Contact  Patient belongings: Brought with patient, plus meds   If patient is a 72 hour hold/Commitment are belongings removed from room and locked up? NA  Medication drips upon transfer: PIV right upper arm infusing NS @ 100 mL/hr, PIV left upper arm infusing albumin @ 250 mL/hr   Sign and held orders released? NA

## 2022-11-22 NOTE — PLAN OF CARE
Goal Outcome Evaluation:    Overall Patient Progress: improvingOverall Patient Progress: improving    Neuro: Alert, HECTOR orientation due to baseline aphasia. Follows commands.   Cardiac: Tele SB/SR. HR down to 40s while sleeping. BP stable, MAP > 65 ex when positional.    Respiratory: 3 L NC, sats > 90%. Oral secretions, suctioned as tolerated, pt refusing at times.    GI/: PEG tube in place. TF restarted @ 0000, running @ 60 mL/hr, 100 mL H2O flushes q4h. Incontinent urine, external catheter.   Lines/Drips: RPIV and LPIV. NS infusing @ 100 mL/hr. IV zosyn for sepsis.    Pain: Denies pain, rFLACC 0.   Skin: Blanchable redness to sacrum/coccyx. Mepilex in place, reposition q2h.   Activity: Bedrest. Assist of 2 w/ lift.   Additional/Plan: Lactic acid trending down, 2.1 this AM. On K, Mg, P protocols, P replaced overnight. Possible transfer to [facility]. Will continue with plan of care.

## 2022-11-22 NOTE — PLAN OF CARE
Neuro: alert , periods of agitation with cares, pushing against care gives , resisting cares, clamps teeth will not allow oral suctioning , will follow commands inconsistently,   Cardio: sinus rhythm ,MAP > 65,   Pulmonary:coarse lung sounds , on room air, sats 95-98%, patient coughs and has fluid in back of throat, will not allow oral suctioning, with patient calling out voice is wet and gargling,  GI/: tolerating tube feeding at goal , incontinent of stool, frequent liquid tan stools , adequate urine output extremal catheter in place  Skin:  excoriated perineum , right buttock has reddened /purple area that is non blanchable   Plan of care: mother updated by phone this AM, she is aware of possible transfer,

## 2022-11-22 NOTE — PROGRESS NOTES
[facility]  General Surgery Progress Note        Mark Hummel MD, MD   11/22/2022        Interval History:      Patient brought into the ICU for soft blood pressures.  Has been managed with IV antibiotics but has not required intubation.  No pain on abdominal examination.  No drainage or irritation around G-tube site.         Assessment and Plan:      62-year-old gentleman with sepsis likely related to pneumonia.  Found to have some bubbles of air in the abdomen on CT scan.  This appears to be related to episodes of vomiting and is existing G-tube.  No evidence for intra-abdominal perforation.  We will sign off, call with questions.                   Physical Exam:      Blood pressure 109/53, pulse 93, temperature 97.3  F (36.3  C), temperature source Axillary, resp. rate 8, height 1.829 m (6'), weight 73.8 kg (162 lb 11.2 oz), SpO2 96 %.  Vitals:    11/21/22 0830 11/22/22 0022   Weight: 71.3 kg (157 lb 3 oz) 73.8 kg (162 lb 11.2 oz)     Vital Signs with Ranges  Temp:  [97.3  F (36.3  C)-98.2  F (36.8  C)] 97.3  F (36.3  C)  Pulse:  [42-93] 93  Resp:  [8-22] 8  BP: ()/(38-77) 109/53  SpO2:  [90 %-100 %] 96 %  I/O's Last 24 hours  I/O last 3 completed shifts:  In: 5566.33 [I.V.:2468.33; NG/GT:710; IV Piggyback:2028]  Out: 800 [Urine:800]    Constitutional:  Patient more interactive today.   Lungs:  Breathing adequately on supplemental oxygen   Cardiovascular:  Soft blood pressures   Abdomen:  Soft, nontender, nondistended   Skin:  Warm, dry   Imaging:           Medications:          acetylcysteine  2 mL Nebulization 4x Daily     albuterol  2.5 mg Nebulization 4x Daily     Brivaracetam  100 mg Per Feeding Tube BID     calcium carbonate  1,250 mg Per Feeding Tube TID     carBAMazepine  100 mg Per Feeding Tube Daily     carBAMazepine  150 mg Per Feeding Tube TID     [Held by provider] hydrocortisone  5-15 mg Per Feeding Tube BID     hydrocortisone sodium succinate PF  100 mg Intravenous  Q8H     insulin aspart  1-6 Units Subcutaneous Q4H     levothyroxine  150 mcg Per Feeding Tube Daily     [Held by provider] metoclopramide  10 mg Oral 4x Daily AC & HS     pantoprazole  40 mg Intravenous Daily with breakfast     piperacillin-tazobactam  3.375 g Intravenous Q6H     sodium chloride (PF)  3 mL Intracatheter Q8H            Data:      All new lab and imaging data was reviewed.   Recent Labs   Lab Test 11/22/22  0509 11/21/22  1312 11/20/22  2053 07/05/22  0035 06/18/22  0327 06/16/22  2248 05/09/22  1122 12/27/21  1630   WBC 9.2 12.0* 9.9 8.4   < >  --    < > 9.6   HGB 10.0* 10.3* 15.6 12.2*   < >  --    < > 14.0   MCV 89 91 88 89   < >  --    < > 93   PLT 78* 91* 191 264   < >  --    < > 200   INR  --   --   --  1.15  --  1.22*  --  1.28*    < > = values in this interval not displayed.

## 2022-11-22 NOTE — PROGRESS NOTES
[facility] ICU RESPIRATORY NOTE        Date of Admission: 11/20/2022    Significant Events Today: Deescalated patient from 2 Liters nasal cannula to room air. Successfully delivered scheduled nebulized medications for patient. Current breath sounds are coarse and diminished.     ABG Results:   Recent Labs   Lab 11/20/22 2058   PH 7.46*         Skin Assessment: No open wounds or sores.     Plan: To better control his hypotension then he can be moved to the floors.     Lakia Lujan on 11/22/2022 at 1:54 PM

## 2022-11-22 NOTE — CONSULTS
Care Management Initial Consult    General Information  Assessment completed with: Care Team Member, Parents (message for mom Savannah), Mother Savannah Guardian  Type of CM/SW Visit: CM Role Introduction  PCP Listed in Epic as correct  PCA services through [facility]  Phone#[phone]  Per Savannah apprvd for RN 84Hours Q2 weeks but unable to secure current is 12H/day of PCA on NOC [place] is also patients PCA providing day/night coverage as well  Supplies for TF and other DME through [facility] (Savannah has enough supplies at home)  [facility] following for homecare at discharge.    Primary Care Provider verified and updated as needed: Yes   Readmission within the last 30 days: no previous admission in last 30 days      Reason for Consult: discharge planning  Advance Care Planning:            Communication Assessment  Patient's communication style: spoken language (English or Bilingual) (non verbal at baseline)    Hearing Difficulty or Deaf: no        Cognitive  Cognitive/Neuro/Behavioral: .WDL except  Level of Consciousness: alert  Arousal Level: opens eyes spontaneously, arouses to touch/gentle shaking, arouses to voice  Orientation: other (see comments) (HECTOR)  Mood/Behavior: calm  Best Language: 2 - Severe aphasia  Speech: incomprehensible sounds    Living Environment:   People in home: parent(s)  Savannah  Current living Arrangements: house      Able to return to prior arrangements: yes       Family/Social Support:  Care provided by: parent(s), other (see comments) (PCA services)  Provides care for: no one, unable/limited ability to care for self  Marital Status: Single  Parent(s), Sibling(s), PCA          Description of Support System: Supportive, Involved    Support Assessment: Adequate family and caregiver support, Adequate social supports    Current Resources:   Patient receiving home care services: No (email sent to [facility] to confirm or consider homecare at discharge)     Community Resources: [facility]  "Programs, County Worker, DME, Financial/Insurance, Housekeeping/Chore Agency, PCA  Equipment currently used at home: hospital bed, lift device  Supplies currently used at home: Incontinence Supplies, Nutritional Supplements, Enteral Nutrition & Supplies    Employment/Financial:  Employment Status: disabled        Financial Concerns: No concerns identified   Referral to Financial Worker: No       Lifestyle & Psychosocial Needs:  Social Determinants of Health     Tobacco Use: Medium Risk     Smoking Tobacco Use: Former     Smokeless Tobacco Use: Never     Passive Exposure: Not on file   Alcohol Use: Not on file   Financial Resource Strain: Not on file   Food Insecurity: Not on file   Transportation Needs: Not on file   Physical Activity: Not on file   Stress: Not on file   Social Connections: Not on file   Intimate Partner Violence: Not on file   Depression: Not at risk     PHQ-2 Score: 0   Housing Stability: Not on file       Functional Status:  Prior to admission patient needed assistance: [employer], will need [facility] non emergent stretcher at discharge.             Mental Health Status:          Chemical Dependency Status:                Values/Beliefs:  Spiritual, Cultural Beliefs, Adventism Practices, Values that affect care:                 Additional Information:  Writer called patient's Mother Savannah for discharge planning.  Writer went over role and scope of safe discharge planning. Savannah is aware that we will continue to work with her regarding a safe discharge plan.  Savannah confirmed the prior to admission PCA services, she stated \"we have approval for an   RN, however we can't find one.\" She stated that she is a PCA for [facility] and covers day into evening hours and a PCA stays over night and provides ~12hours of assistance.  Patient did have homecare through [facility] through 09/22, however Savannah would like to have homecare for [facility] at discharge if recommended. Per email to [facility] they should " have availability for the patient and will hold onto the referral (they were seeing the patient for RN/PT/OT/HHA) will put a sticky note to the provider to order at discharge if appropriate.  Savannah states that she has been unable to bring [facility] to follow up's and is doing virtual visits.  She noted that he is due for a colonoscopy and would like the number for [facility] reccs so that she can call them for arrangements.  Writer answered all questions. Savannah is aware that [facility] will most likely transfer off of the ICU unit once a bed is available and that bedside will inform her of the room etc. Savannah is aware that we will continue to follow for further discharge planning and needs.        Ingris Scott RN, BSN, ACM   Care Transitions Specialist  [facility]  Care Transitions Specialist  [id number] [email or web address]  [email or web address]  Office: [phone] Fax: [phone]  [facility]

## 2022-11-22 NOTE — PROGRESS NOTES
[facility]  [facility]  Gastroenterology Progress note    Interval History:      No acute events.      Vital Signs:      /54 (Patient Position: Semi-Galvez's)   Pulse 66   Temp 97.5  F (36.4  C) (Axillary)   Resp 19   Ht 1.829 m (6')   Wt 73.8 kg (162 lb 11.2 oz)   SpO2 96%   BMI 22.07 kg/m    Temp (24hrs), Av  F (36.7  C), Min:97.4  F (36.3  C), Max:99  F (37.2  C)    Patient Vitals for the past 72 hrs:   Weight   22 0022 73.8 kg (162 lb 11.2 oz)   22 0830 71.3 kg (157 lb 3 oz)       Intake/Output Summary (Last 24 hours) at 2022 0917  Last data filed at 2022 0800  Gross per 24 hour   Intake 5686.33 ml   Output 800 ml   Net 4886.33 ml         Constitutional: NAD, comfortable  Cardiovascular: RRR, normal S1, S2   Respiratory: CTAB  Abdomen: soft, non-tender, nondistended    Additional Comments:  ROS, FH, SH: See initial GI consult for details.    Laboratory Data:  Recent Labs   Lab Test 22  0509 22  1312 223 22  0035 22  0327 22  2248 22  1122 21  1630   WBC 9.2 12.0* 9.9 8.4   < >  --    < > 9.6   HGB 10.0* 10.3* 15.6 12.2*   < >  --    < > 14.0   MCV 89 91 88 89   < >  --    < > 93   PLT 78* 91* 191 264   < >  --    < > 200   INR  --   --   --  1.15  --  1.22*  --  1.28*    < > = values in this interval not displayed.     Recent Labs   Lab Test 22  0509 22  1213 22  2053    141 140   POTASSIUM 3.5 3.8 3.5   CHLORIDE 113* 109 102   CO2 25 29 31   BUN 15 20 26   CR 0.86 1.12 0.99   ANIONGAP 6 3 7   STEVE 7.6* 7.9* 9.2     Recent Labs   Lab Test 22  0509 22  1213 22  2141 22  2053 22  0747 22  21122  2245 22  2141 22  1411 22  0835 22  1742 21  2247 21  2226 21  0032   ALBUMIN 2.8* 2.3*  --  3.4 2.7*   < >  --  3.2*   < > 2.5*  --    < > 3.2* 3.3*   BILITOTAL 0.3 0.7  --  0.3 0.5   < >   --  0.2   < > 1.7*  --    < > 0.7 0.4   DBIL  --   --   --   --  <0.1  --   --  0.1  --  0.9*  --    < >  --   --    ALT 22 24  --  46 84*   < >  --  44   < > 26  --    < > 24 34   AST 22 26  --  55* 75*   < >  --  34   < > 50*  --    < > 22 26   ALKPHOS 60 84  --  150 145   < >  --  126   < > 106  --    < > 109 108   PROTEIN  --   --  30*  --   --   --  20*  --   --   --  30*   < >  --   --    LIPASE  --   --   --  326  --   --   --   --   --   --   --   --  529* 312    < > = values in this interval not displayed.         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  CT A/P shows evidence of small amount of free air under the diaphragm.  Patient has GJ with no recent stent exchange.  No signs of bowel wall thickening to suggest perforation.  Surgery has been consulted.  We are consulted for enlarging pancreatic cyst seen on CT, currently 2.4 cm.  Lipase, LFTs unremarkable.  Pt currently afebrile with no WBC elevation.  Patient transferred to ICU yesterday for hypotension in the setting of sepsis.  Blood cultures + for gram positive cocci.  Plan:     -  Will discuss plan for possible MRI vs EUS for further evaluation of pancreatic cyst with biliary team.  Non-emergent.  -  Appreciate surgical input re: free air.  No signs of bowel perforation.  Likely related to GJ tube.     Total Time Spent: 15 minutes    SILVERIO Wen  [facility]  Office:  [phone] call if needed after 12PM  Cell:  [phone], not available after 12PM at this number

## 2022-11-23 LAB
ALBUMIN SERPL-MCNC: 2.8 G/DL (ref 3.4–5)
ALP SERPL-CCNC: 61 U/L (ref 40–150)
ALT SERPL W P-5'-P-CCNC: 21 U/L (ref 0–70)
ANION GAP SERPL CALCULATED.3IONS-SCNC: 5 MMOL/L (ref 3–14)
AST SERPL W P-5'-P-CCNC: 20 U/L (ref 0–45)
BILIRUB SERPL-MCNC: 0.7 MG/DL (ref 0.2–1.3)
BUN SERPL-MCNC: 13 MG/DL (ref 7–30)
CALCIUM SERPL-MCNC: 7.9 MG/DL (ref 8.5–10.1)
CHLORIDE BLD-SCNC: 115 MMOL/L (ref 94–109)
CO2 SERPL-SCNC: 26 MMOL/L (ref 20–32)
CREAT SERPL-MCNC: 0.7 MG/DL (ref 0.66–1.25)
ERYTHROCYTE [DISTWIDTH] IN BLOOD BY AUTOMATED COUNT: 14.2 % (ref 10–15)
GFR SERPL CREATININE-BSD FRML MDRD: >90 ML/MIN/1.73M2
GLUCOSE BLD-MCNC: 188 MG/DL (ref 70–99)
GLUCOSE BLDC GLUCOMTR-MCNC: 108 MG/DL (ref 70–99)
GLUCOSE BLDC GLUCOMTR-MCNC: 165 MG/DL (ref 70–99)
GLUCOSE BLDC GLUCOMTR-MCNC: 174 MG/DL (ref 70–99)
GLUCOSE BLDC GLUCOMTR-MCNC: 191 MG/DL (ref 70–99)
GLUCOSE BLDC GLUCOMTR-MCNC: 195 MG/DL (ref 70–99)
GLUCOSE BLDC GLUCOMTR-MCNC: 200 MG/DL (ref 70–99)
HCT VFR BLD AUTO: 34.2 % (ref 40–53)
HGB BLD-MCNC: 10.8 G/DL (ref 13.3–17.7)
LACTATE SERPL-SCNC: 2.8 MMOL/L (ref 0.7–2)
MAGNESIUM SERPL-MCNC: 2.1 MG/DL (ref 1.6–2.3)
MCH RBC QN AUTO: 28.6 PG (ref 26.5–33)
MCHC RBC AUTO-ENTMCNC: 31.6 G/DL (ref 31.5–36.5)
MCV RBC AUTO: 91 FL (ref 78–100)
PLATELET # BLD AUTO: 103 10E3/UL (ref 150–450)
POTASSIUM BLD-SCNC: 3.5 MMOL/L (ref 3.4–5.3)
PROT SERPL-MCNC: 6.3 G/DL (ref 6.8–8.8)
RBC # BLD AUTO: 3.78 10E6/UL (ref 4.4–5.9)
SODIUM SERPL-SCNC: 146 MMOL/L (ref 133–144)
WBC # BLD AUTO: 9.5 10E3/UL (ref 4–11)
ZINC SERPL-MCNC: 26.9 UG/DL

## 2022-11-23 PROCEDURE — 85027 COMPLETE CBC AUTOMATED: CPT | Performed by: HOSPITALIST

## 2022-11-23 PROCEDURE — 99232 SBSQ HOSP IP/OBS MODERATE 35: CPT | Performed by: HOSPITALIST

## 2022-11-23 PROCEDURE — 999N000157 HC STATISTIC RCP TIME EA 10 MIN

## 2022-11-23 PROCEDURE — 120N000013 HC R&B IMCU

## 2022-11-23 PROCEDURE — 99223 1ST HOSP IP/OBS HIGH 75: CPT | Performed by: INTERNAL MEDICINE

## 2022-11-23 PROCEDURE — 94640 AIRWAY INHALATION TREATMENT: CPT

## 2022-11-23 PROCEDURE — 250N000011 HC RX IP 250 OP 636: Performed by: HOSPITALIST

## 2022-11-23 PROCEDURE — 94640 AIRWAY INHALATION TREATMENT: CPT | Mod: 76

## 2022-11-23 PROCEDURE — 250N000011 HC RX IP 250 OP 636: Performed by: INTERNAL MEDICINE

## 2022-11-23 PROCEDURE — 83605 ASSAY OF LACTIC ACID: CPT | Performed by: HOSPITALIST

## 2022-11-23 PROCEDURE — 250N000009 HC RX 250: Performed by: HOSPITALIST

## 2022-11-23 PROCEDURE — 36415 COLL VENOUS BLD VENIPUNCTURE: CPT | Performed by: HOSPITALIST

## 2022-11-23 PROCEDURE — 83735 ASSAY OF MAGNESIUM: CPT | Performed by: HOSPITALIST

## 2022-11-23 PROCEDURE — 250N000013 HC RX MED GY IP 250 OP 250 PS 637: Performed by: HOSPITALIST

## 2022-11-23 PROCEDURE — 250N000009 HC RX 250: Performed by: INTERNAL MEDICINE

## 2022-11-23 PROCEDURE — G0463 HOSPITAL OUTPT CLINIC VISIT: HCPCS

## 2022-11-23 PROCEDURE — 250N000013 HC RX MED GY IP 250 OP 250 PS 637: Performed by: INTERNAL MEDICINE

## 2022-11-23 PROCEDURE — 258N000003 HC RX IP 258 OP 636: Performed by: HOSPITALIST

## 2022-11-23 PROCEDURE — C9113 INJ PANTOPRAZOLE SODIUM, VIA: HCPCS | Performed by: HOSPITALIST

## 2022-11-23 PROCEDURE — 80053 COMPREHEN METABOLIC PANEL: CPT | Performed by: HOSPITALIST

## 2022-11-23 RX ORDER — NITROGLYCERIN 0.4 MG/1
0.4 TABLET SUBLINGUAL EVERY 5 MIN PRN
Status: DISCONTINUED | OUTPATIENT
Start: 2022-11-23 | End: 2022-11-29 | Stop reason: HOSPADM

## 2022-11-23 RX ORDER — LIDOCAINE 40 MG/G
CREAM TOPICAL
Status: DISCONTINUED | OUTPATIENT
Start: 2022-11-23 | End: 2022-11-29 | Stop reason: HOSPADM

## 2022-11-23 RX ADMIN — LEVOTHYROXINE SODIUM 150 MCG: 150 TABLET ORAL at 08:57

## 2022-11-23 RX ADMIN — PIPERACILLIN AND TAZOBACTAM 3.38 G: 3; .375 INJECTION, POWDER, FOR SOLUTION INTRAVENOUS at 20:24

## 2022-11-23 RX ADMIN — INSULIN ASPART 1 UNITS: 100 INJECTION, SOLUTION INTRAVENOUS; SUBCUTANEOUS at 13:06

## 2022-11-23 RX ADMIN — ACETYLCYSTEINE 2 ML: 200 SOLUTION ORAL; RESPIRATORY (INHALATION) at 19:27

## 2022-11-23 RX ADMIN — HYDROCORTISONE SODIUM SUCCINATE 100 MG: 100 INJECTION, POWDER, FOR SOLUTION INTRAMUSCULAR; INTRAVENOUS at 13:07

## 2022-11-23 RX ADMIN — ALBUTEROL SULFATE 2.5 MG: 2.5 SOLUTION RESPIRATORY (INHALATION) at 15:46

## 2022-11-23 RX ADMIN — CARBAMAZEPINE 100 MG: 100 SUSPENSION ORAL at 18:10

## 2022-11-23 RX ADMIN — BRIVARACETAM 100 MG: 10 SOLUTION ORAL at 09:10

## 2022-11-23 RX ADMIN — HYDROCORTISONE SODIUM SUCCINATE 100 MG: 100 INJECTION, POWDER, FOR SOLUTION INTRAMUSCULAR; INTRAVENOUS at 20:24

## 2022-11-23 RX ADMIN — PIPERACILLIN AND TAZOBACTAM 3.38 G: 3; .375 INJECTION, POWDER, FOR SOLUTION INTRAVENOUS at 03:09

## 2022-11-23 RX ADMIN — ALBUTEROL SULFATE 2.5 MG: 2.5 SOLUTION RESPIRATORY (INHALATION) at 19:26

## 2022-11-23 RX ADMIN — ALBUTEROL SULFATE 2.5 MG: 2.5 SOLUTION RESPIRATORY (INHALATION) at 11:41

## 2022-11-23 RX ADMIN — CALCIUM CARBONATE 1250 MG: 1250 SUSPENSION ORAL at 16:02

## 2022-11-23 RX ADMIN — ACETYLCYSTEINE 2 ML: 200 SOLUTION ORAL; RESPIRATORY (INHALATION) at 07:48

## 2022-11-23 RX ADMIN — HYDROCORTISONE SODIUM SUCCINATE 100 MG: 100 INJECTION, POWDER, FOR SOLUTION INTRAMUSCULAR; INTRAVENOUS at 03:10

## 2022-11-23 RX ADMIN — ALBUTEROL SULFATE 2.5 MG: 2.5 SOLUTION RESPIRATORY (INHALATION) at 07:48

## 2022-11-23 RX ADMIN — CARBAMAZEPINE 150 MG: 100 SUSPENSION ORAL at 06:25

## 2022-11-23 RX ADMIN — CARBAMAZEPINE 150 MG: 100 SUSPENSION ORAL at 13:06

## 2022-11-23 RX ADMIN — INSULIN ASPART 2 UNITS: 100 INJECTION, SOLUTION INTRAVENOUS; SUBCUTANEOUS at 08:56

## 2022-11-23 RX ADMIN — PANTOPRAZOLE SODIUM 40 MG: 40 INJECTION, POWDER, FOR SOLUTION INTRAVENOUS at 08:57

## 2022-11-23 RX ADMIN — BRIVARACETAM 100 MG: 10 SOLUTION ORAL at 21:17

## 2022-11-23 RX ADMIN — CALCIUM CARBONATE 1250 MG: 1250 SUSPENSION ORAL at 20:37

## 2022-11-23 RX ADMIN — PIPERACILLIN AND TAZOBACTAM 3.38 G: 3; .375 INJECTION, POWDER, FOR SOLUTION INTRAVENOUS at 13:07

## 2022-11-23 RX ADMIN — CALCIUM CARBONATE 1250 MG: 1250 SUSPENSION ORAL at 08:58

## 2022-11-23 RX ADMIN — INSULIN ASPART 2 UNITS: 100 INJECTION, SOLUTION INTRAVENOUS; SUBCUTANEOUS at 00:20

## 2022-11-23 RX ADMIN — PIPERACILLIN AND TAZOBACTAM 3.38 G: 3; .375 INJECTION, POWDER, FOR SOLUTION INTRAVENOUS at 08:57

## 2022-11-23 RX ADMIN — CARBAMAZEPINE 150 MG: 100 SUSPENSION ORAL at 00:22

## 2022-11-23 RX ADMIN — SODIUM CHLORIDE, PRESERVATIVE FREE: 5 INJECTION INTRAVENOUS at 03:29

## 2022-11-23 RX ADMIN — ACETYLCYSTEINE 2 ML: 200 SOLUTION ORAL; RESPIRATORY (INHALATION) at 15:46

## 2022-11-23 RX ADMIN — ACETYLCYSTEINE 2 ML: 200 SOLUTION ORAL; RESPIRATORY (INHALATION) at 11:41

## 2022-11-23 RX ADMIN — INSULIN ASPART 2 UNITS: 100 INJECTION, SOLUTION INTRAVENOUS; SUBCUTANEOUS at 03:50

## 2022-11-23 RX ADMIN — INSULIN ASPART 1 UNITS: 100 INJECTION, SOLUTION INTRAVENOUS; SUBCUTANEOUS at 20:37

## 2022-11-23 ASSESSMENT — ACTIVITIES OF DAILY LIVING (ADL)
ADLS_ACUITY_SCORE: 53
ADLS_ACUITY_SCORE: 57
ADLS_ACUITY_SCORE: 53
ADLS_ACUITY_SCORE: 57
ADLS_ACUITY_SCORE: 53
ADLS_ACUITY_SCORE: 53
ADLS_ACUITY_SCORE: 57
ADLS_ACUITY_SCORE: 53

## 2022-11-23 NOTE — PROGRESS NOTES
Patient's lactic acid came back as 4.7.  Of note this was ordered this morning but it got delayed for some reason.  Patient blood pressure have been stable with systolic more than 100, no hypoxia, he is tolerating the tube feeds and his mentation is at baseline.  I do not know most of this is a lab error. He has been a evaluated by general surgery team today and they have signed off. On exam his abdomen looks benign. He was also seen  by G.I. team today and they have discussed his case with pancreatic biliary team and there is no plan for any for the Work up. We will continue the patient with scheduled IV fluids, and I will give him bolus of 500 cc and will repeat all his labs again after the bolus. Discussed the plan with ICU nurse and also with RN on IMC . RN to page on call with results    Chart update

## 2022-11-23 NOTE — PLAN OF CARE
Patient is Alert, and non verbal, but will follow commands. Unable to determine orientation. Vitals are stable on room air. Tele is sinus bradycardia. Patient had one BM on shift. Urinating adequately with external catheter. PEG tube for enteral feeding, tolerating well.  Patient is up with lift and is bedbound. Patient did not sleep on shift. Plan is to continue monitoring lactic acid levels, turn and repo q2 hours, patient has blanchable area on coxxyx, mepelex in place.
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
## 2022-11-23 NOTE — CONSULTS
[facility] Nurse Inpatient Assessment     Consulted for:  Coccyx/ buttocks    Summary:  Mild IAD with pressure/shear component; not a clear pressure injury at this time.  Pt has long hx of off-and-on skin issues to area, usually related to incontinence.  Pulsate mattress ordered 11/23.     Patient History (according to provider note(s):      Keyon Farias is a 62 year old male admitted on 11/20/2022. He presents to the emergency department with fever, vomiting, hiccups and is found to have right middle and lower lobe infiltrates consistent with aspiration and a long history of the same, small amount of free air under the diaphragm.    Areas Assessed:      Areas visualized during today's visit: Heels  and Sacrum/coccyx    Wound location: coccyx area    Last photo: 11-23-22      Wound due to: mix IAD, pressure, shear/abrasion  Wound history/plan of care: pt well known to [facility] service from numerous prior admissions.  Resisting repositioning 11/23.  Low air loss mattress ordered.  Wound base: blanchable erythema and several small linear areas of non-blanching redness and abraded tissue     Palpation of the wound bed: normal      Drainage: none     Description of drainage: none     Measurements (length x width x depth, in cm): approx 2 x 0.5 x 0cm right coccyx and 1 x 0.5 x 0cm mirrored areas inner buttocks  Periwound skin: Intact and Erythema- blanchable      Color: pink      Temperature: normal   Odor: none  Pain: tension to hands, feet and body with attempt to reposition  Pain interventions prior to dressing change: slow and gentle cares   Treatment goal: Heal  and Protection  STATUS: initial assessment  Supplies ordered: at bedside       Treatment Plan:     Coccyx: every other day and prn:  1.  Cleanse with mild soap and water, dry well.  2.  Swab area with Cavilon no-sting barrier, let dry.  3.  Apply Mepilex to upper coccyx/sacrum.  4.  Apply very thin glaze of barrier paste to any skin  breakdown below coccyx and to perianal area, BID and prn.   5.  PIP measures.     Pressure Injury Prevention (PIP) Plan:  -If patient is declining pressure injury prevention interventions: Explore reason why and address patient's concerns  -Mattress: low air loss  -HOB: Maintain at or below 30 degrees, unless contraindicated  -Repositioning in bed: Every 1-2 hours , Left/right positioning; avoid supine and Raise foot of bed prior to raising head of bed, to reduce patient sliding down (shear)  -Heels: Keep elevated off mattress and Pillows under calves  -Protective Dressing: Sacral Mepilex for prevention (#[id number]),  especially for the agitated patient   -Moisture Management: Perineal cleansing /protection: Follow Incontinence Protocol, Avoid brief in bed, Clean and dry skin folds with bathing  and Moisturize dry skin  -Under Devices: Inspect skin under all medical devices during skin inspection , Ensure tubes are stabilized without tension and Ensure patient is not lying on medical devices or equipment when repositioned      Orders: Written    RECOMMEND PRIMARY TEAM ORDER: None, at this time  Education provided: importance of repositioning, plan of care, Moisture management, Hygiene and Off-loading pressure  Discussed plan of care with: Patient, Family and Nurse  WOC nurse follow-up plan: weekly  Notify WOC if wound(s) deteriorate.  Nursing to notify the Provider(s) and re-consult the WOC Nurse if new skin concern.    DATA:     Current support surface: Standard  Atmos Air mattress - ordered Pulsate 11/23  Containment of urine/stool: Incontinence Protocol  BMI: Body mass index is 22.07 kg/m .   Active diet order: Orders Placed This Encounter      NPO for Medical/Clinical Reasons Except for: No Exceptions     Output: I/O last 3 completed shifts:  In: 1000 [I.V.:100; NG/GT:200]  Out: 400 [Urine:400]     Labs: Recent Labs   Lab 11/23/22  0549 11/21/22  1312 11/21/22  1213 11/20/22 2053   ALBUMIN 2.8*   < > 2.3* 3.4    HGB 10.8*   < >  --  15.6   WBC 9.5   < >  --  9.9   A1C  --   --   --  5.6   CRP  --   --  116.0*  --     < > = values in this interval not displayed.     Pressure injury risk assessment:   Sensory Perception: 2-->very limited  Moisture: 3-->occasionally moist  Activity: 1-->bedfast  Mobility: 2-->very limited  Nutrition: 3-->adequate  Friction and Shear: 2-->potential problem  Ricci Score: 13    Kristie Norris RN   Dept. Pager: [phone]  Dept. Office Number: [phone]

## 2022-11-24 LAB
GLUCOSE BLDC GLUCOMTR-MCNC: 161 MG/DL (ref 70–99)
GLUCOSE BLDC GLUCOMTR-MCNC: 168 MG/DL (ref 70–99)
GLUCOSE BLDC GLUCOMTR-MCNC: 177 MG/DL (ref 70–99)
GLUCOSE BLDC GLUCOMTR-MCNC: 182 MG/DL (ref 70–99)
GLUCOSE BLDC GLUCOMTR-MCNC: 186 MG/DL (ref 70–99)
GLUCOSE BLDC GLUCOMTR-MCNC: 202 MG/DL (ref 70–99)
GLUCOSE BLDC GLUCOMTR-MCNC: 211 MG/DL (ref 70–99)
GLUCOSE BLDC GLUCOMTR-MCNC: 218 MG/DL (ref 70–99)
MAGNESIUM SERPL-MCNC: 2.4 MG/DL (ref 1.6–2.3)
PHOSPHATE SERPL-MCNC: 1.3 MG/DL (ref 2.5–4.5)
POTASSIUM BLD-SCNC: 3.5 MMOL/L (ref 3.4–5.3)

## 2022-11-24 PROCEDURE — 83735 ASSAY OF MAGNESIUM: CPT | Performed by: HOSPITALIST

## 2022-11-24 PROCEDURE — 84100 ASSAY OF PHOSPHORUS: CPT | Performed by: HOSPITALIST

## 2022-11-24 PROCEDURE — 999N000157 HC STATISTIC RCP TIME EA 10 MIN

## 2022-11-24 PROCEDURE — 250N000013 HC RX MED GY IP 250 OP 250 PS 637: Performed by: HOSPITALIST

## 2022-11-24 PROCEDURE — 250N000011 HC RX IP 250 OP 636: Performed by: HOSPITALIST

## 2022-11-24 PROCEDURE — 94640 AIRWAY INHALATION TREATMENT: CPT

## 2022-11-24 PROCEDURE — 120N000013 HC R&B IMCU

## 2022-11-24 PROCEDURE — C9113 INJ PANTOPRAZOLE SODIUM, VIA: HCPCS | Performed by: HOSPITALIST

## 2022-11-24 PROCEDURE — 99232 SBSQ HOSP IP/OBS MODERATE 35: CPT | Performed by: HOSPITALIST

## 2022-11-24 PROCEDURE — 94640 AIRWAY INHALATION TREATMENT: CPT | Mod: 76

## 2022-11-24 PROCEDURE — 250N000009 HC RX 250: Performed by: HOSPITALIST

## 2022-11-24 PROCEDURE — 36415 COLL VENOUS BLD VENIPUNCTURE: CPT | Performed by: HOSPITALIST

## 2022-11-24 PROCEDURE — 84132 ASSAY OF SERUM POTASSIUM: CPT | Performed by: HOSPITALIST

## 2022-11-24 RX ADMIN — HYDROCORTISONE SODIUM SUCCINATE 100 MG: 100 INJECTION, POWDER, FOR SOLUTION INTRAMUSCULAR; INTRAVENOUS at 04:42

## 2022-11-24 RX ADMIN — LEVOTHYROXINE SODIUM 150 MCG: 150 TABLET ORAL at 08:08

## 2022-11-24 RX ADMIN — CARBAMAZEPINE 150 MG: 100 SUSPENSION ORAL at 01:10

## 2022-11-24 RX ADMIN — CALCIUM CARBONATE 1250 MG: 1250 SUSPENSION ORAL at 08:07

## 2022-11-24 RX ADMIN — CALCIUM CARBONATE 1250 MG: 1250 SUSPENSION ORAL at 13:41

## 2022-11-24 RX ADMIN — PIPERACILLIN AND TAZOBACTAM 3.38 G: 3; .375 INJECTION, POWDER, FOR SOLUTION INTRAVENOUS at 01:10

## 2022-11-24 RX ADMIN — ACETYLCYSTEINE 2 ML: 200 SOLUTION ORAL; RESPIRATORY (INHALATION) at 15:23

## 2022-11-24 RX ADMIN — CARBAMAZEPINE 150 MG: 100 SUSPENSION ORAL at 13:42

## 2022-11-24 RX ADMIN — INSULIN ASPART 2 UNITS: 100 INJECTION, SOLUTION INTRAVENOUS; SUBCUTANEOUS at 11:46

## 2022-11-24 RX ADMIN — CARBAMAZEPINE 150 MG: 100 SUSPENSION ORAL at 07:57

## 2022-11-24 RX ADMIN — ALBUTEROL SULFATE 2.5 MG: 2.5 SOLUTION RESPIRATORY (INHALATION) at 07:57

## 2022-11-24 RX ADMIN — INSULIN ASPART 2 UNITS: 100 INJECTION, SOLUTION INTRAVENOUS; SUBCUTANEOUS at 00:34

## 2022-11-24 RX ADMIN — ALBUTEROL SULFATE 2.5 MG: 2.5 SOLUTION RESPIRATORY (INHALATION) at 20:25

## 2022-11-24 RX ADMIN — ACETYLCYSTEINE 2 ML: 200 SOLUTION ORAL; RESPIRATORY (INHALATION) at 10:49

## 2022-11-24 RX ADMIN — INSULIN ASPART 1 UNITS: 100 INJECTION, SOLUTION INTRAVENOUS; SUBCUTANEOUS at 08:35

## 2022-11-24 RX ADMIN — PIPERACILLIN AND TAZOBACTAM 3.38 G: 3; .375 INJECTION, POWDER, FOR SOLUTION INTRAVENOUS at 14:30

## 2022-11-24 RX ADMIN — BRIVARACETAM 100 MG: 10 SOLUTION ORAL at 21:58

## 2022-11-24 RX ADMIN — INSULIN ASPART 1 UNITS: 100 INJECTION, SOLUTION INTRAVENOUS; SUBCUTANEOUS at 04:41

## 2022-11-24 RX ADMIN — ALBUTEROL SULFATE 2.5 MG: 2.5 SOLUTION RESPIRATORY (INHALATION) at 10:48

## 2022-11-24 RX ADMIN — PIPERACILLIN AND TAZOBACTAM 3.38 G: 3; .375 INJECTION, POWDER, FOR SOLUTION INTRAVENOUS at 21:59

## 2022-11-24 RX ADMIN — INSULIN ASPART 2 UNITS: 100 INJECTION, SOLUTION INTRAVENOUS; SUBCUTANEOUS at 22:26

## 2022-11-24 RX ADMIN — ACETYLCYSTEINE 2 ML: 200 SOLUTION ORAL; RESPIRATORY (INHALATION) at 20:25

## 2022-11-24 RX ADMIN — PANTOPRAZOLE SODIUM 40 MG: 40 INJECTION, POWDER, FOR SOLUTION INTRAVENOUS at 08:19

## 2022-11-24 RX ADMIN — HYDROCORTISONE SODIUM SUCCINATE 50 MG: 100 INJECTION, POWDER, FOR SOLUTION INTRAMUSCULAR; INTRAVENOUS at 21:58

## 2022-11-24 RX ADMIN — PIPERACILLIN AND TAZOBACTAM 3.38 G: 3; .375 INJECTION, POWDER, FOR SOLUTION INTRAVENOUS at 07:58

## 2022-11-24 RX ADMIN — CALCIUM CARBONATE 1250 MG: 1250 SUSPENSION ORAL at 21:58

## 2022-11-24 RX ADMIN — CARBAMAZEPINE 100 MG: 100 SUSPENSION ORAL at 18:47

## 2022-11-24 RX ADMIN — INSULIN ASPART 1 UNITS: 100 INJECTION, SOLUTION INTRAVENOUS; SUBCUTANEOUS at 16:47

## 2022-11-24 RX ADMIN — ACETYLCYSTEINE 2 ML: 200 SOLUTION ORAL; RESPIRATORY (INHALATION) at 07:57

## 2022-11-24 RX ADMIN — ALBUTEROL SULFATE 2.5 MG: 2.5 SOLUTION RESPIRATORY (INHALATION) at 15:23

## 2022-11-24 RX ADMIN — BRIVARACETAM 100 MG: 10 SOLUTION ORAL at 09:43

## 2022-11-24 RX ADMIN — HYDROCORTISONE SODIUM SUCCINATE 50 MG: 100 INJECTION, POWDER, FOR SOLUTION INTRAMUSCULAR; INTRAVENOUS at 13:42

## 2022-11-24 ASSESSMENT — ACTIVITIES OF DAILY LIVING (ADL)
ADLS_ACUITY_SCORE: 49
ADLS_ACUITY_SCORE: 53
ADLS_ACUITY_SCORE: 49
ADLS_ACUITY_SCORE: 49
ADLS_ACUITY_SCORE: 53

## 2022-11-24 NOTE — PROGRESS NOTES
Pt seen today for neb tx. BS diminished and coarse.neb given per order. Pt has dry mucus in his mouth. Refused suction at times. tolerated tx. On RA. Will continue to monitor.    Porter Leahy, RT

## 2022-11-24 NOTE — PLAN OF CARE
Goal Outcome Evaluation:      Plan of Care Reviewed With: patient, parent    Overall Patient Progress: improvingOverall Patient Progress: improving    IMC discontinued.   VSS, baseline bradycardic in 40-50's while resting. Alert but nonverbal. Right sided paresis. Strict NPO with continuous tube feeds @ 60/hr and 100 flush Q4. IVF reduced to 50/hr. Oral cares offered to patient numerous times throughout the day but patient refused every time by pushing suction away and getting agitated. Placed on pulsatte mattress, WOC did dressing change to coccyx. External cath in place, good urine output. Blood sugars monitored/corrected. Cardiology consulted for ECHO findings. CTM

## 2022-11-24 NOTE — PROGRESS NOTES
Clinic Care Coordination Contact  [facility]: Post-Discharge Note  SITUATION                                                      Admission:    Admission Date: 12/05/21   Reason for Admission: Sepsis due to aspiration pneumonia  Discharge:   Discharge Date: 12/13/21  Discharge Diagnosis: Sepsis due to aspiration pneumonia    BACKGROUND                                                      Active Problems:    Acute respiratory failure with hypoxia (H)    Aspiration pneumonitis (H)    Free intraperitoneal air    Septic shock (H)         Recurrent aspiration pneumonia with acute hypoxic respiratory failure and sepsis.  * Multiple hospitalizations for aspiration pneumonia, last in 11/2021. Has chronic GJ-tube.  * Presented 12/5 with fevers and AMS. Noted to be hypoxic by EMS. On initial evaluation 12/5 was febrile to 102.7, tachycardic, tachypneic, sats in 90's on O2; WBC 11.9, lactate 3.4, procalcitonin 0.21, d-dimer 1.9. CT chest 12/6 showed no PE: prominent bilateral lower lobe infiltrates compatible with pneumonitis; also extraluminal air (see below). Required BiPAP in the ED. Started on pip-tazo and vanco 12/5  - clinically much improved, mentation back to baseline, fever trended down, afebrile since 12/7;  WBC 11.9--->10.3, lactic acid 4.4---3.8, pro jamel 0.21  - blood cultures from 12/5 with no growth so far  - discontinued vancomycin 12/8; will switch Zosyn to Augmentin 875 mg BID through feeding tube (12/9)  - will continue PTA Mucomyst with albuterol nebs 4 times daily  - initially requiring Bipap, oxygen now weaned down to room air  -complete antibiotics with Augmentin      Intraperitoneal free air/gas, possibly related to recent GJ-tube exchange.  * CT chest 12/6 also showed extraluminal air is seen in the right upper quadrant around the colon, noted this was of indeterminate etiology but concerning for potential bowel perforation.   - CT abdomen and pelvis 12/6 showed small amount of free  Neurology follow up note    ARNIE [id number]      Interval History:    Patient feels ok no new complaints.    Allergies    No Known Allergies    Intolerances        MEDICATIONS    aspirin enteric coated 81 milliGRAM(s) Oral daily  atorvastatin 10 milliGRAM(s) Oral at bedtime  dextrose 5%. 1000 milliLiter(s) IV Continuous <Continuous>  dextrose 5%. 1000 milliLiter(s) IV Continuous <Continuous>  dextrose 50% Injectable 25 Gram(s) IV Push once  dextrose 50% Injectable 12.5 Gram(s) IV Push once  dextrose 50% Injectable 25 Gram(s) IV Push once  dextrose Oral Gel 15 Gram(s) Oral once PRN  furosemide    Tablet 40 milliGRAM(s) Oral daily  glucagon  Injectable 1 milliGRAM(s) IntraMuscular once  insulin glargine Injectable (LANTUS) 25 Unit(s) SubCutaneous at bedtime  insulin lispro (ADMELOG) corrective regimen sliding scale   SubCutaneous three times a day before meals  insulin lispro (ADMELOG) corrective regimen sliding scale   SubCutaneous at bedtime  insulin lispro Injectable (ADMELOG) 10 Unit(s) SubCutaneous three times a day before meals  levETIRAcetam 750 milliGRAM(s) Oral two times a day  pantoprazole  Injectable 40 milliGRAM(s) IV Push two times a day  propranolol 20 milliGRAM(s) Oral two times a day  spironolactone 25 milliGRAM(s) Oral two times a day            Weight (kg): 70.8 (11-23 @ 09:53)    Vital Signs Last 24 Hrs  T(C): 36.8 (24 Nov 2022 05:15), Max: 36.8 (23 Nov 2022 12:33)  T(F): 98.2 (24 Nov 2022 05:15), Max: 98.2 (23 Nov 2022 12:33)  HR: 73 (24 Nov 2022 06:01) (65 - 90)  BP: 107/68 (24 Nov 2022 06:01) (104/64 - 128/80)  BP(mean): --  RR: 16 (24 Nov 2022 05:15) (16 - 20)  SpO2: 96% (24 Nov 2022 05:15) (95% - 100%)    Parameters below as of 24 Nov 2022 05:15  Patient On (Oxygen Delivery Method): room air      REVIEW OF SYSTEMS:  Completely limited secondary to the patient repeats words over, has severe expressive aphasia, cannot express himself properly, but has been in his normal state of health and is at his baseline at present as per daughter.    PHYSICAL EXAMINATION:   HEENT:  Head:  Normocephalic.  Eyes:  No scleral icterus.  Ears:  Hearing hard to evaluate secondary to cannot answer questions accordingly.  NECK:  Supple.  RESPIRATORY:  Good air entry bilaterally.  CARDIOVASCULAR:  S1 and S2 heard.  ABDOMEN:  Soft and nontender.  EXTREMITIES:  No clubbing or cyanosis was noted.      NEUROLOGIC:  The patient is awake and alert.  Positive on-off blink to visual threat.  Positive expressive aphasia, will say a few words but repeat same words over.  Right upper and right lower 0/5 with increased tone.  Right hemiplegia at baseline.  Left upper and left lower 4/5.                  LABS:  CBC Full  -  ( 23 Nov 2022 09:40 )  WBC Count : 8.48 K/uL  RBC Count : 4.28 M/uL  Hemoglobin : 8.2 g/dL  Hematocrit : 28.1 %  Platelet Count - Automated : 275 K/uL  Mean Cell Volume : 65.7 fl  Mean Cell Hemoglobin : 19.2 pg  Mean Cell Hemoglobin Concentration : 29.2 gm/dL  Auto Neutrophil # : x  Auto Lymphocyte # : x  Auto Monocyte # : x  Auto Eosinophil # : x  Auto Basophil # : x  Auto Neutrophil % : x  Auto Lymphocyte % : x  Auto Monocyte % : x  Auto Eosinophil % : x  Auto Basophil % : x      11-23    136  |  99  |  18  ----------------------------<  239<H>  3.2<L>   |  32<H>  |  0.92    Ca    8.7      23 Nov 2022 09:40    TPro  7.1  /  Alb  3.1<L>  /  TBili  0.9  /  DBili  x   /  AST  13<L>  /  ALT  15  /  AlkPhos  193<H>  11-23    Hemoglobin A1C:     LIVER FUNCTIONS - ( 23 Nov 2022 09:40 )  Alb: 3.1 g/dL / Pro: 7.1 g/dL / ALK PHOS: 193 U/L / ALT: 15 U/L / AST: 13 U/L / GGT: x           Vitamin B12         RADIOLOGY  ANALYSIS AND PLAN:  This is a 67-year-old with history of cerebrovascular accident with low hemoglobin and hematocrit, atrial fibrillation, and heart valve replacement.  For history of cerebrovascular accident and atrial fibrillation, risks versus benefits if the patient is having any type of active GI bleeding.  From neurology standpoint, the patient does not need to be on antiplatelet and anticoagulation as he is on anticoagulation from neurology standpoint for atrial fibrillation.  For history of seizure prophylaxis, continue the patient on Keppra.  For history of diabetes, strict control of blood sugars.  For hypertension, monitor systolic blood pressure.    Spoke with daughter, Jeffrey, at bedside.  Her telephone number is [phone] 11/23  She understands my reasoning and thought process.    Greater than 40 minutes of time was spent with the patient, plan of care, reviewing data, with greater than 50% of the visit was spent counseling and/or coordinating care with multidisciplinary healthcare team       intraperitoneal gas as well as extraluminal gas in the fat of the abdomen; noted that this extraluminal gas appeared concentrated in the right upper quadrant near the cecum and ascending colon; no specific bowel abnormality was evident.   - Evaluated by CRS in the ED and abdomen was benign and felt that CT findings could be related to recent GJ-tube replacement (11/16).   - abdomen benign; clinically stable; resumed Tube feeds 12/7 and tolerating well     Thrombocytopenia  Chronic Anemia  - noted drop in platelet 187---121---100  - also Hb 15.7---11.4---10.6; was likely hem-concentrated on admission; baseline hemoglobin around 11 to 12  - d/kevin Zosyn on 12/9 as above  -no suggestion of active bleed; hemodynamically stable;  -CBC with primary care provider     Hyponatremia:   Last Na 140 12/8. Unclear trajectory. On free water flushes.   --pt was receiving zosyn with high Na load and LR until 2 days ago   - on usual dose of carbamazepine     Sodium level [phone] today   Pt was getting free water flushes at 200ml q 4hours and as per his mother his tube flushes are usually at 60-100ml q 4hours  Reduced free water flushes to 100ml q 4hours and sodium improved to 130 again   Will recheck BMP in 1week with results to PCP        ASSESSMENT      Enrollment  Primary Care Care Coordination Status: Not a Candidate    Discharge Assessment  How are you doing now that you are home?: He is okay  How are your symptoms? (Red Flag symptoms escalate to triage hotline per guidelines): Improved  Do you feel your condition is stable enough to be safe at home until your provider visit?: Yes  Does the patient have their discharge instructions? : Yes  Does the patient have questions regarding their discharge instructions? : No  Were you started on any new medications or were there changes to any of your previous medications? : Yes  Does the patient have all of their medications?: Yes  Do you have questions regarding any  of your medications? : No  Do you have all of your needed medical supplies or equipment (DME)?  (i.e. oxygen tank, CPAP, cane, etc.): Yes  Discharge follow-up appointment scheduled within 14 calendar days? : Yes  Discharge Follow Up Appointment Date: 12/23/21  Discharge Follow Up Appointment Scheduled with?: Primary Care Provider                  PLAN                                                      Outpatient Plan:     Follow up with primary care provider, Carlos Gomez, within 7 days for hospital follow- up. With BMP, magnesium and phosphorus level and CBC with platelet count.     Chest X-ray in 4 weeks.         Future Appointments   Date Time Provider Department Center   12/16/2021 11:20 AM  LAB ONLY [facility] [place]   1/5/2022  2:00 PM Faizan Mclean MD [facility]         For any urgent concerns, please contact our 24 hour nurse triage line: [phone])       All TCM questions were answered by Gerber Nuñez.  Mariaa Oneal MA                 Neurology follow up note    ARNIE [id number]      Interval History:    Patient feels ok no new complaints seen with daughter     Allergies    No Known Allergies    Intolerances        MEDICATIONS    aspirin enteric coated 81 milliGRAM(s) Oral daily  atorvastatin 10 milliGRAM(s) Oral at bedtime  dextrose 5%. 1000 milliLiter(s) IV Continuous <Continuous>  dextrose 5%. 1000 milliLiter(s) IV Continuous <Continuous>  dextrose 50% Injectable 25 Gram(s) IV Push once  dextrose 50% Injectable 12.5 Gram(s) IV Push once  dextrose 50% Injectable 25 Gram(s) IV Push once  dextrose Oral Gel 15 Gram(s) Oral once PRN  furosemide    Tablet 40 milliGRAM(s) Oral daily  glucagon  Injectable 1 milliGRAM(s) IntraMuscular once  insulin glargine Injectable (LANTUS) 25 Unit(s) SubCutaneous at bedtime  insulin lispro (ADMELOG) corrective regimen sliding scale   SubCutaneous three times a day before meals  insulin lispro (ADMELOG) corrective regimen sliding scale   SubCutaneous at bedtime  insulin lispro Injectable (ADMELOG) 10 Unit(s) SubCutaneous three times a day before meals  levETIRAcetam 750 milliGRAM(s) Oral two times a day  pantoprazole  Injectable 40 milliGRAM(s) IV Push two times a day  propranolol 20 milliGRAM(s) Oral two times a day  spironolactone 25 milliGRAM(s) Oral two times a day            Weight (kg): 70.8 (11-23 @ 09:53)    Vital Signs Last 24 Hrs  T(C): 36.8 (24 Nov 2022 05:15), Max: 36.8 (23 Nov 2022 12:33)  T(F): 98.2 (24 Nov 2022 05:15), Max: 98.2 (23 Nov 2022 12:33)  HR: 73 (24 Nov 2022 06:01) (65 - 90)  BP: 107/68 (24 Nov 2022 06:01) (104/64 - 128/80)  BP(mean): --  RR: 16 (24 Nov 2022 05:15) (16 - 20)  SpO2: 96% (24 Nov 2022 05:15) (95% - 100%)    Parameters below as of 24 Nov 2022 05:15  Patient On (Oxygen Delivery Method): room air      REVIEW OF SYSTEMS:  Completely limited secondary to the patient repeats words over, has severe expressive aphasia, cannot express himself properly, but has been in his normal state of health and is at his baseline at present as per daughter.    PHYSICAL EXAMINATION:   HEENT:  Head:  Normocephalic.  Eyes:  No scleral icterus.  Ears:  Hearing hard to evaluate secondary to cannot answer questions accordingly.  NECK:  Supple.  RESPIRATORY:  Good air entry bilaterally.  CARDIOVASCULAR:  S1 and S2 heard.  ABDOMEN:  Soft and nontender.  EXTREMITIES:  No clubbing or cyanosis was noted.      NEUROLOGIC:  The patient is awake and alert.  Positive on-off blink to visual threat.  Positive expressive aphasia, will say a few words but repeat same words over.  Right upper and right lower 0/5 with increased tone.  Right hemiplegia at baseline.  Left upper and left lower 4/5.                  LABS:  CBC Full  -  ( 23 Nov 2022 09:40 )  WBC Count : 8.48 K/uL  RBC Count : 4.28 M/uL  Hemoglobin : 8.2 g/dL  Hematocrit : 28.1 %  Platelet Count - Automated : 275 K/uL  Mean Cell Volume : 65.7 fl  Mean Cell Hemoglobin : 19.2 pg  Mean Cell Hemoglobin Concentration : 29.2 gm/dL  Auto Neutrophil # : x  Auto Lymphocyte # : x  Auto Monocyte # : x  Auto Eosinophil # : x  Auto Basophil # : x  Auto Neutrophil % : x  Auto Lymphocyte % : x  Auto Monocyte % : x  Auto Eosinophil % : x  Auto Basophil % : x      11-23    136  |  99  |  18  ----------------------------<  239<H>  3.2<L>   |  32<H>  |  0.92    Ca    8.7      23 Nov 2022 09:40    TPro  7.1  /  Alb  3.1<L>  /  TBili  0.9  /  DBili  x   /  AST  13<L>  /  ALT  15  /  AlkPhos  193<H>  11-23    Hemoglobin A1C:     LIVER FUNCTIONS - ( 23 Nov 2022 09:40 )  Alb: 3.1 g/dL / Pro: 7.1 g/dL / ALK PHOS: 193 U/L / ALT: 15 U/L / AST: 13 U/L / GGT: x           Vitamin B12         RADIOLOGY  ANALYSIS AND PLAN:  This is a 67-year-old with history of cerebrovascular accident with low hemoglobin and hematocrit, atrial fibrillation, and heart valve replacement.  For history of cerebrovascular accident and atrial fibrillation, risks versus benefits if the patient is having any type of active GI bleeding.  From neurology standpoint, the patient does not need to be on antiplatelet and anticoagulation as he is on anticoagulation from neurology standpoint for atrial fibrillation.  For history of seizure prophylaxis, continue the patient on Keppra.  For history of diabetes, strict control of blood sugars.  For hypertension, monitor systolic blood pressure.    Spoke with daughter, Jeffrey, at bedside.  Her telephone number is [phone] 11/24  She understands my reasoning and thought process.    Greater than 40 minutes of time was spent with the patient, plan of care, reviewing data, with greater than 50% of the visit was spent counseling and/or coordinating care with multidisciplinary healthcare team
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
## 2022-11-24 NOTE — PLAN OF CARE
Pt alert; HECTOR orientation, pt nonverbal. VSS on RA. A2 w/ lift; turn/repo. NPO; pt received TF through PEG tube; TF at goal rate of 60mL/hr w/ 100mL flush q4 hours. Pt does not appear to be in pain per RFLACC pain scale. Incontinent BB; external cath, voiding adequately; 1 loose BM. PIV x2; L saline locked; R infusing NS at 50mL/hr. Wound on coccyx/lower back; mepilex in place, WOC following.

## 2022-11-25 ENCOUNTER — APPOINTMENT (OUTPATIENT)
Dept: INTERVENTIONAL RADIOLOGY/VASCULAR | Facility: CLINIC | Age: 62
DRG: 871 | End: 2022-11-25
Attending: HOSPITALIST
Payer: MEDICARE

## 2022-11-25 LAB
ANION GAP SERPL CALCULATED.3IONS-SCNC: 2 MMOL/L (ref 3–14)
BACTERIA BLD CULT: NO GROWTH
BUN SERPL-MCNC: 17 MG/DL (ref 7–30)
CALCIUM SERPL-MCNC: 8.7 MG/DL (ref 8.5–10.1)
CHLORIDE BLD-SCNC: 115 MMOL/L (ref 94–109)
CO2 SERPL-SCNC: 32 MMOL/L (ref 20–32)
CREAT SERPL-MCNC: 0.74 MG/DL (ref 0.66–1.25)
ERYTHROCYTE [DISTWIDTH] IN BLOOD BY AUTOMATED COUNT: 14.1 % (ref 10–15)
GFR SERPL CREATININE-BSD FRML MDRD: >90 ML/MIN/1.73M2
GLUCOSE BLD-MCNC: 153 MG/DL (ref 70–99)
GLUCOSE BLDC GLUCOMTR-MCNC: 117 MG/DL (ref 70–99)
GLUCOSE BLDC GLUCOMTR-MCNC: 128 MG/DL (ref 70–99)
GLUCOSE BLDC GLUCOMTR-MCNC: 135 MG/DL (ref 70–99)
GLUCOSE BLDC GLUCOMTR-MCNC: 138 MG/DL (ref 70–99)
GLUCOSE BLDC GLUCOMTR-MCNC: 138 MG/DL (ref 70–99)
GLUCOSE BLDC GLUCOMTR-MCNC: 174 MG/DL (ref 70–99)
GLUCOSE BLDC GLUCOMTR-MCNC: 179 MG/DL (ref 70–99)
GLUCOSE BLDC GLUCOMTR-MCNC: 188 MG/DL (ref 70–99)
HCT VFR BLD AUTO: 38.6 % (ref 40–53)
HGB BLD-MCNC: 12.3 G/DL (ref 13.3–17.7)
MAGNESIUM SERPL-MCNC: 2.4 MG/DL (ref 1.6–2.3)
MCH RBC QN AUTO: 28.6 PG (ref 26.5–33)
MCHC RBC AUTO-ENTMCNC: 31.9 G/DL (ref 31.5–36.5)
MCV RBC AUTO: 90 FL (ref 78–100)
PHOSPHATE SERPL-MCNC: 1.6 MG/DL (ref 2.5–4.5)
PHOSPHATE SERPL-MCNC: 2.9 MG/DL (ref 2.5–4.5)
PLATELET # BLD AUTO: 148 10E3/UL (ref 150–450)
POTASSIUM BLD-SCNC: 3.4 MMOL/L (ref 3.4–5.3)
POTASSIUM BLD-SCNC: 3.4 MMOL/L (ref 3.4–5.3)
RBC # BLD AUTO: 4.3 10E6/UL (ref 4.4–5.9)
SODIUM SERPL-SCNC: 149 MMOL/L (ref 133–144)
WBC # BLD AUTO: 8.8 10E3/UL (ref 4–11)

## 2022-11-25 PROCEDURE — 250N000009 HC RX 250: Performed by: RADIOLOGY

## 2022-11-25 PROCEDURE — 94640 AIRWAY INHALATION TREATMENT: CPT | Mod: 76

## 2022-11-25 PROCEDURE — 999N000157 HC STATISTIC RCP TIME EA 10 MIN

## 2022-11-25 PROCEDURE — 83735 ASSAY OF MAGNESIUM: CPT | Performed by: INTERNAL MEDICINE

## 2022-11-25 PROCEDURE — 36415 COLL VENOUS BLD VENIPUNCTURE: CPT | Performed by: INTERNAL MEDICINE

## 2022-11-25 PROCEDURE — 84132 ASSAY OF SERUM POTASSIUM: CPT | Performed by: HOSPITALIST

## 2022-11-25 PROCEDURE — 250N000013 HC RX MED GY IP 250 OP 250 PS 637: Performed by: HOSPITALIST

## 2022-11-25 PROCEDURE — 84100 ASSAY OF PHOSPHORUS: CPT | Performed by: INTERNAL MEDICINE

## 2022-11-25 PROCEDURE — 250N000011 HC RX IP 250 OP 636: Performed by: HOSPITALIST

## 2022-11-25 PROCEDURE — 99222 1ST HOSP IP/OBS MODERATE 55: CPT | Performed by: INTERNAL MEDICINE

## 2022-11-25 PROCEDURE — 120N000013 HC R&B IMCU

## 2022-11-25 PROCEDURE — C1769 GUIDE WIRE: HCPCS

## 2022-11-25 PROCEDURE — 0D2DXUZ CHANGE FEEDING DEVICE IN LOWER INTESTINAL TRACT, EXTERNAL APPROACH: ICD-10-PCS | Performed by: RADIOLOGY

## 2022-11-25 PROCEDURE — 99232 SBSQ HOSP IP/OBS MODERATE 35: CPT | Performed by: HOSPITALIST

## 2022-11-25 PROCEDURE — 84132 ASSAY OF SERUM POTASSIUM: CPT | Performed by: INTERNAL MEDICINE

## 2022-11-25 PROCEDURE — 250N000009 HC RX 250: Performed by: HOSPITALIST

## 2022-11-25 PROCEDURE — 49452 REPLACE G-J TUBE PERC: CPT

## 2022-11-25 PROCEDURE — 36415 COLL VENOUS BLD VENIPUNCTURE: CPT | Performed by: HOSPITALIST

## 2022-11-25 PROCEDURE — 85027 COMPLETE CBC AUTOMATED: CPT | Performed by: HOSPITALIST

## 2022-11-25 PROCEDURE — 250N000013 HC RX MED GY IP 250 OP 250 PS 637: Performed by: INTERNAL MEDICINE

## 2022-11-25 PROCEDURE — 94640 AIRWAY INHALATION TREATMENT: CPT

## 2022-11-25 RX ORDER — HYDROCORTISONE 5 MG/1
5 TABLET ORAL EVERY 24 HOURS
Status: DISCONTINUED | OUTPATIENT
Start: 2022-11-25 | End: 2022-11-29 | Stop reason: HOSPADM

## 2022-11-25 RX ORDER — GUAIFENESIN 600 MG/1
15 TABLET, EXTENDED RELEASE ORAL DAILY
Status: DISCONTINUED | OUTPATIENT
Start: 2022-11-25 | End: 2022-11-29 | Stop reason: HOSPADM

## 2022-11-25 RX ORDER — CHOLECALCIFEROL (VITAMIN D3) 50 MCG
50 TABLET ORAL DAILY
Status: DISCONTINUED | OUTPATIENT
Start: 2022-11-25 | End: 2022-11-25

## 2022-11-25 RX ORDER — HYDROCORTISONE 5 MG/1
5-15 TABLET ORAL 2 TIMES DAILY
Status: DISCONTINUED | OUTPATIENT
Start: 2022-11-26 | End: 2022-11-25

## 2022-11-25 RX ORDER — CHOLECALCIFEROL (VITAMIN D3) 50 MCG
50 TABLET ORAL DAILY
Status: DISCONTINUED | OUTPATIENT
Start: 2022-11-25 | End: 2022-11-29 | Stop reason: HOSPADM

## 2022-11-25 RX ORDER — MULTIVITAMIN,THERAPEUTIC
1 TABLET ORAL DAILY
Status: DISCONTINUED | OUTPATIENT
Start: 2022-11-25 | End: 2022-11-25 | Stop reason: ALTCHOICE

## 2022-11-25 RX ORDER — HYDROCORTISONE 5 MG/1
15 TABLET ORAL EVERY MORNING
Status: DISCONTINUED | OUTPATIENT
Start: 2022-11-25 | End: 2022-11-29 | Stop reason: HOSPADM

## 2022-11-25 RX ORDER — METOCLOPRAMIDE HYDROCHLORIDE 5 MG/5ML
10 SOLUTION ORAL
Status: DISCONTINUED | OUTPATIENT
Start: 2022-11-25 | End: 2022-11-29 | Stop reason: HOSPADM

## 2022-11-25 RX ORDER — LIDOCAINE HYDROCHLORIDE 20 MG/ML
JELLY TOPICAL ONCE
Status: COMPLETED | OUTPATIENT
Start: 2022-11-25 | End: 2022-11-25

## 2022-11-25 RX ORDER — ASCORBIC ACID 500 MG
1000 TABLET ORAL DAILY
Status: DISCONTINUED | OUTPATIENT
Start: 2022-11-25 | End: 2022-11-29 | Stop reason: HOSPADM

## 2022-11-25 RX ADMIN — CARBAMAZEPINE 100 MG: 100 SUSPENSION ORAL at 22:05

## 2022-11-25 RX ADMIN — HYDROCORTISONE SODIUM SUCCINATE 50 MG: 100 INJECTION, POWDER, FOR SOLUTION INTRAMUSCULAR; INTRAVENOUS at 12:47

## 2022-11-25 RX ADMIN — POTASSIUM & SODIUM PHOSPHATES POWDER PACK 280-160-250 MG 2 PACKET: 280-160-250 PACK at 08:36

## 2022-11-25 RX ADMIN — ACETYLCYSTEINE 2 ML: 200 SOLUTION ORAL; RESPIRATORY (INHALATION) at 16:22

## 2022-11-25 RX ADMIN — ACETYLCYSTEINE 2 ML: 200 SOLUTION ORAL; RESPIRATORY (INHALATION) at 20:24

## 2022-11-25 RX ADMIN — BRIVARACETAM 100 MG: 10 SOLUTION ORAL at 20:57

## 2022-11-25 RX ADMIN — METOCLOPRAMIDE HYDROCHLORIDE 10 MG: 5 SOLUTION ORAL at 22:06

## 2022-11-25 RX ADMIN — Medication 176 MG: at 09:44

## 2022-11-25 RX ADMIN — CARBAMAZEPINE 150 MG: 100 SUSPENSION ORAL at 01:02

## 2022-11-25 RX ADMIN — ALBUTEROL SULFATE 2.5 MG: 2.5 SOLUTION RESPIRATORY (INHALATION) at 16:22

## 2022-11-25 RX ADMIN — METOCLOPRAMIDE HYDROCHLORIDE 10 MG: 5 SOLUTION ORAL at 13:02

## 2022-11-25 RX ADMIN — OXYCODONE HYDROCHLORIDE AND ACETAMINOPHEN 1000 MG: 500 TABLET ORAL at 12:46

## 2022-11-25 RX ADMIN — INSULIN ASPART 1 UNITS: 100 INJECTION, SOLUTION INTRAVENOUS; SUBCUTANEOUS at 01:03

## 2022-11-25 RX ADMIN — CARBAMAZEPINE 150 MG: 100 SUSPENSION ORAL at 05:15

## 2022-11-25 RX ADMIN — LEVOTHYROXINE SODIUM 150 MCG: 150 TABLET ORAL at 08:36

## 2022-11-25 RX ADMIN — THERA TABS 1 TABLET: TAB at 12:46

## 2022-11-25 RX ADMIN — PIPERACILLIN AND TAZOBACTAM 3.38 G: 3; .375 INJECTION, POWDER, FOR SOLUTION INTRAVENOUS at 20:53

## 2022-11-25 RX ADMIN — Medication 50 MCG: at 12:46

## 2022-11-25 RX ADMIN — CALCIUM CARBONATE 1250 MG: 1250 SUSPENSION ORAL at 20:53

## 2022-11-25 RX ADMIN — CARBAMAZEPINE 150 MG: 100 SUSPENSION ORAL at 13:02

## 2022-11-25 RX ADMIN — HYDROCORTISONE SODIUM SUCCINATE 50 MG: 100 INJECTION, POWDER, FOR SOLUTION INTRAMUSCULAR; INTRAVENOUS at 04:30

## 2022-11-25 RX ADMIN — Medication 40 MG: at 09:44

## 2022-11-25 RX ADMIN — CALCIUM CARBONATE 1250 MG: 1250 SUSPENSION ORAL at 08:42

## 2022-11-25 RX ADMIN — LIDOCAINE HYDROCHLORIDE: 20 JELLY TOPICAL at 12:01

## 2022-11-25 RX ADMIN — POTASSIUM & SODIUM PHOSPHATES POWDER PACK 280-160-250 MG 2 PACKET: 280-160-250 PACK at 12:46

## 2022-11-25 RX ADMIN — PIPERACILLIN AND TAZOBACTAM 3.38 G: 3; .375 INJECTION, POWDER, FOR SOLUTION INTRAVENOUS at 08:36

## 2022-11-25 RX ADMIN — HYDROCORTISONE 15 MG: 5 TABLET ORAL at 12:45

## 2022-11-25 RX ADMIN — METOCLOPRAMIDE HYDROCHLORIDE 10 MG: 5 SOLUTION ORAL at 16:58

## 2022-11-25 RX ADMIN — POTASSIUM & SODIUM PHOSPHATES POWDER PACK 280-160-250 MG 2 PACKET: 280-160-250 PACK at 16:57

## 2022-11-25 RX ADMIN — INSULIN ASPART 1 UNITS: 100 INJECTION, SOLUTION INTRAVENOUS; SUBCUTANEOUS at 08:42

## 2022-11-25 RX ADMIN — CALCIUM CARBONATE 1250 MG: 1250 SUSPENSION ORAL at 13:02

## 2022-11-25 RX ADMIN — INSULIN ASPART 1 UNITS: 100 INJECTION, SOLUTION INTRAVENOUS; SUBCUTANEOUS at 05:14

## 2022-11-25 RX ADMIN — ALBUTEROL SULFATE 2.5 MG: 2.5 SOLUTION RESPIRATORY (INHALATION) at 20:24

## 2022-11-25 RX ADMIN — PIPERACILLIN AND TAZOBACTAM 3.38 G: 3; .375 INJECTION, POWDER, FOR SOLUTION INTRAVENOUS at 14:27

## 2022-11-25 RX ADMIN — PIPERACILLIN AND TAZOBACTAM 3.38 G: 3; .375 INJECTION, POWDER, FOR SOLUTION INTRAVENOUS at 01:58

## 2022-11-25 RX ADMIN — BRIVARACETAM 100 MG: 10 SOLUTION ORAL at 09:44

## 2022-11-25 RX ADMIN — HYDROCORTISONE 5 MG: 5 TABLET ORAL at 17:03

## 2022-11-25 ASSESSMENT — ACTIVITIES OF DAILY LIVING (ADL)
ADLS_ACUITY_SCORE: 53
ADLS_ACUITY_SCORE: 49
ADLS_ACUITY_SCORE: 53

## 2022-11-25 NOTE — PLAN OF CARE
1900h: Pt Alert, non-verbal. Able to nod & thumbs up for yes. O2Sat 93% on RA. Clear bilateral breath sound in UL & diminished coarse crackles in LL. Tele: S. Bradycardia @ 57 bpm.     NPO, unable to clear secretion independently. PEG tube in situ, w/ enteral feeding: Jevity 1.5 60 ml/hr for 22h w/ water flushes 100 ml/ 4h. Kept HOB 30 degrees. Gastric residual 5m, pt tolerates feeding well. Incontinent of B/B. External catheter in place, w/ adequate yellow urine. Normoactive BS x4, last BM 11/24. Severely impaired, both feet & R hand w/ contractures. Repositioned every 2h to sides.       PIV SL on L upper arm.  On K, Mg, Ph protocol.  Suctioned thick tan bronchial secretion.

## 2022-11-25 NOTE — PLAN OF CARE
Goal Outcome Evaluation:         A&O x's 2, non-verbal but understands and responds with head nods and thumbs up. VSS - on R/A with systolic BP's a little increased and bradycardic at times. Lungs sounds - course. Bowel Sounds - normoactive, incontinent with large, loose BM during shift. Urine Output - Large amt of output during shift via external cath. Ambulation - Assist of 2 w/c-lift, bedrest, turn and repo Q2. Diet - NPO with TPN through JJ tube, running at 60 ml's, 100 ml flush Q4. Pain controlled by - No pain during shift. STEVO PIV, saline locked. R forearm PIV, NaCl ran for half the day and then discontinued.

## 2022-11-25 NOTE — PROVIDER NOTIFICATION
Paged Dr Schmitt about the Jejunal port  occlusion.  IR did replace it back in 7/1/22. The same issue is happening now. Should I switch the feeding to gastric port or how do you wants us to proceed?     Waiting call back

## 2022-11-25 NOTE — CONSULTS
Patient is on IR schedule today Friday 11/25/22 for a GJ tube exchange for a clogged GJ tube.     -Labs WNL for procedure.    -Orders for NPO have been entered.    -Phone consent was obtained from mother Savannah after explaining the procedure, risks and benefits and consent is in IR.     [place] the IR department [place] [phone] for procedural related questions.     Thanks, Osiris [facility] Interventional Radiology CNP ([phone]) (phone [phone])
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
## 2022-11-25 NOTE — IR NOTE
Interventional Radiology Intra-procedural Nursing Note    Patient Name: Keyon Farias  Medical Record Number: [id number]  Today's Date: November 25, 2022    Procedure: GJ tube exchange  Start time: 1159  End time: 1208  Report provided to: Caryn CHRISTINE  Patient depart time and location: 1215 to Room 320    Note: Patient entered Interventional Radiology Suite number 1 via cart. Patient awake, alert and orientated. Assisted onto procedural table in supine position. Prepped and draped.  Dr. Dunn in room. Time out and procedure started.   Procedure well tolerated by patient without complications. Procedure end with debrief by Dr. Dunn.   Barrier cream and island dressing applied to LUQ, dressing is c/d/i.    Administered medication totals:  Lidocaine 2% 6 mL topical

## 2022-11-26 LAB
ANION GAP SERPL CALCULATED.3IONS-SCNC: 7 MMOL/L (ref 3–14)
BACTERIA BLD CULT: NO GROWTH
BACTERIA BLD CULT: NO GROWTH
BUN SERPL-MCNC: 20 MG/DL (ref 7–30)
CALCIUM SERPL-MCNC: 8.4 MG/DL (ref 8.5–10.1)
CHLORIDE BLD-SCNC: 115 MMOL/L (ref 94–109)
CO2 SERPL-SCNC: 27 MMOL/L (ref 20–32)
CREAT SERPL-MCNC: 0.78 MG/DL (ref 0.66–1.25)
ERYTHROCYTE [DISTWIDTH] IN BLOOD BY AUTOMATED COUNT: 14.2 % (ref 10–15)
GFR SERPL CREATININE-BSD FRML MDRD: >90 ML/MIN/1.73M2
GLUCOSE BLD-MCNC: 140 MG/DL (ref 70–99)
GLUCOSE BLDC GLUCOMTR-MCNC: 119 MG/DL (ref 70–99)
GLUCOSE BLDC GLUCOMTR-MCNC: 126 MG/DL (ref 70–99)
GLUCOSE BLDC GLUCOMTR-MCNC: 132 MG/DL (ref 70–99)
GLUCOSE BLDC GLUCOMTR-MCNC: 135 MG/DL (ref 70–99)
GLUCOSE BLDC GLUCOMTR-MCNC: 137 MG/DL (ref 70–99)
GLUCOSE BLDC GLUCOMTR-MCNC: 153 MG/DL (ref 70–99)
GLUCOSE BLDC GLUCOMTR-MCNC: 84 MG/DL (ref 70–99)
HCT VFR BLD AUTO: 38 % (ref 40–53)
HGB BLD-MCNC: 12.2 G/DL (ref 13.3–17.7)
MAGNESIUM SERPL-MCNC: 2.2 MG/DL (ref 1.6–2.3)
MCH RBC QN AUTO: 28.8 PG (ref 26.5–33)
MCHC RBC AUTO-ENTMCNC: 32.1 G/DL (ref 31.5–36.5)
MCV RBC AUTO: 90 FL (ref 78–100)
PHOSPHATE SERPL-MCNC: 3.3 MG/DL (ref 2.5–4.5)
PLATELET # BLD AUTO: 144 10E3/UL (ref 150–450)
POTASSIUM BLD-SCNC: 3.1 MMOL/L (ref 3.4–5.3)
POTASSIUM BLD-SCNC: 3.2 MMOL/L (ref 3.4–5.3)
POTASSIUM BLD-SCNC: 3.5 MMOL/L (ref 3.4–5.3)
RBC # BLD AUTO: 4.24 10E6/UL (ref 4.4–5.9)
SODIUM SERPL-SCNC: 149 MMOL/L (ref 133–144)
WBC # BLD AUTO: 10.5 10E3/UL (ref 4–11)

## 2022-11-26 PROCEDURE — 250N000013 HC RX MED GY IP 250 OP 250 PS 637: Performed by: HOSPITALIST

## 2022-11-26 PROCEDURE — 120N000013 HC R&B IMCU

## 2022-11-26 PROCEDURE — 999N000157 HC STATISTIC RCP TIME EA 10 MIN

## 2022-11-26 PROCEDURE — 94640 AIRWAY INHALATION TREATMENT: CPT

## 2022-11-26 PROCEDURE — 250N000009 HC RX 250: Performed by: HOSPITALIST

## 2022-11-26 PROCEDURE — 36415 COLL VENOUS BLD VENIPUNCTURE: CPT | Performed by: HOSPITALIST

## 2022-11-26 PROCEDURE — 36415 COLL VENOUS BLD VENIPUNCTURE: CPT | Performed by: INTERNAL MEDICINE

## 2022-11-26 PROCEDURE — 80048 BASIC METABOLIC PNL TOTAL CA: CPT | Performed by: HOSPITALIST

## 2022-11-26 PROCEDURE — 84132 ASSAY OF SERUM POTASSIUM: CPT | Performed by: INTERNAL MEDICINE

## 2022-11-26 PROCEDURE — 250N000011 HC RX IP 250 OP 636: Performed by: HOSPITALIST

## 2022-11-26 PROCEDURE — 84100 ASSAY OF PHOSPHORUS: CPT | Performed by: HOSPITALIST

## 2022-11-26 PROCEDURE — 84132 ASSAY OF SERUM POTASSIUM: CPT | Performed by: HOSPITALIST

## 2022-11-26 PROCEDURE — 83735 ASSAY OF MAGNESIUM: CPT | Performed by: INTERNAL MEDICINE

## 2022-11-26 PROCEDURE — 94640 AIRWAY INHALATION TREATMENT: CPT | Mod: 76

## 2022-11-26 PROCEDURE — 93010 ELECTROCARDIOGRAM REPORT: CPT | Performed by: INTERNAL MEDICINE

## 2022-11-26 PROCEDURE — 93005 ELECTROCARDIOGRAM TRACING: CPT

## 2022-11-26 PROCEDURE — 99232 SBSQ HOSP IP/OBS MODERATE 35: CPT | Performed by: HOSPITALIST

## 2022-11-26 PROCEDURE — 85027 COMPLETE CBC AUTOMATED: CPT | Performed by: HOSPITALIST

## 2022-11-26 RX ORDER — POTASSIUM CHLORIDE 20MEQ/15ML
40 LIQUID (ML) ORAL ONCE
Status: COMPLETED | OUTPATIENT
Start: 2022-11-26 | End: 2022-11-26

## 2022-11-26 RX ADMIN — PIPERACILLIN AND TAZOBACTAM 3.38 G: 3; .375 INJECTION, POWDER, FOR SOLUTION INTRAVENOUS at 15:04

## 2022-11-26 RX ADMIN — CARBAMAZEPINE 150 MG: 100 SUSPENSION ORAL at 06:23

## 2022-11-26 RX ADMIN — ALBUTEROL SULFATE 2.5 MG: 2.5 SOLUTION RESPIRATORY (INHALATION) at 11:42

## 2022-11-26 RX ADMIN — INSULIN ASPART 1 UNITS: 100 INJECTION, SOLUTION INTRAVENOUS; SUBCUTANEOUS at 04:20

## 2022-11-26 RX ADMIN — BRIVARACETAM 100 MG: 10 SOLUTION ORAL at 22:00

## 2022-11-26 RX ADMIN — METOCLOPRAMIDE HYDROCHLORIDE 10 MG: 5 SOLUTION ORAL at 22:00

## 2022-11-26 RX ADMIN — OXYCODONE HYDROCHLORIDE AND ACETAMINOPHEN 1000 MG: 500 TABLET ORAL at 09:38

## 2022-11-26 RX ADMIN — LEVOTHYROXINE SODIUM 150 MCG: 150 TABLET ORAL at 08:47

## 2022-11-26 RX ADMIN — CALCIUM CARBONATE 1250 MG: 1250 SUSPENSION ORAL at 09:37

## 2022-11-26 RX ADMIN — ALBUTEROL SULFATE 2.5 MG: 2.5 SOLUTION RESPIRATORY (INHALATION) at 19:25

## 2022-11-26 RX ADMIN — CARBAMAZEPINE 150 MG: 100 SUSPENSION ORAL at 12:23

## 2022-11-26 RX ADMIN — PIPERACILLIN AND TAZOBACTAM 3.38 G: 3; .375 INJECTION, POWDER, FOR SOLUTION INTRAVENOUS at 09:38

## 2022-11-26 RX ADMIN — HYDROCORTISONE 5 MG: 5 TABLET ORAL at 15:04

## 2022-11-26 RX ADMIN — HYDROCORTISONE 15 MG: 5 TABLET ORAL at 09:38

## 2022-11-26 RX ADMIN — PIPERACILLIN AND TAZOBACTAM 3.38 G: 3; .375 INJECTION, POWDER, FOR SOLUTION INTRAVENOUS at 20:38

## 2022-11-26 RX ADMIN — ALBUTEROL SULFATE 2.5 MG: 2.5 SOLUTION RESPIRATORY (INHALATION) at 15:14

## 2022-11-26 RX ADMIN — ACETYLCYSTEINE 2 ML: 200 SOLUTION ORAL; RESPIRATORY (INHALATION) at 19:25

## 2022-11-26 RX ADMIN — POTASSIUM CHLORIDE 40 MEQ: 20 SOLUTION ORAL at 09:38

## 2022-11-26 RX ADMIN — BRIVARACETAM 100 MG: 10 SOLUTION ORAL at 09:38

## 2022-11-26 RX ADMIN — MULTIVITAMIN 15 ML: LIQUID ORAL at 12:23

## 2022-11-26 RX ADMIN — Medication 50 MCG: at 09:38

## 2022-11-26 RX ADMIN — METOCLOPRAMIDE HYDROCHLORIDE 10 MG: 5 SOLUTION ORAL at 12:23

## 2022-11-26 RX ADMIN — ACETYLCYSTEINE 2 ML: 200 SOLUTION ORAL; RESPIRATORY (INHALATION) at 15:14

## 2022-11-26 RX ADMIN — ACETYLCYSTEINE 2 ML: 200 SOLUTION ORAL; RESPIRATORY (INHALATION) at 11:42

## 2022-11-26 RX ADMIN — Medication 176 MG: at 09:37

## 2022-11-26 RX ADMIN — PIPERACILLIN AND TAZOBACTAM 3.38 G: 3; .375 INJECTION, POWDER, FOR SOLUTION INTRAVENOUS at 02:54

## 2022-11-26 RX ADMIN — CARBAMAZEPINE 150 MG: 100 SUSPENSION ORAL at 00:05

## 2022-11-26 RX ADMIN — CALCIUM CARBONATE 1250 MG: 1250 SUSPENSION ORAL at 15:04

## 2022-11-26 RX ADMIN — MULTIVITAMIN 15 ML: LIQUID ORAL at 09:38

## 2022-11-26 RX ADMIN — ACETYLCYSTEINE 2 ML: 200 SOLUTION ORAL; RESPIRATORY (INHALATION) at 07:55

## 2022-11-26 RX ADMIN — ALBUTEROL SULFATE 2.5 MG: 2.5 SOLUTION RESPIRATORY (INHALATION) at 07:55

## 2022-11-26 RX ADMIN — Medication 40 MG: at 09:38

## 2022-11-26 RX ADMIN — CALCIUM CARBONATE 1250 MG: 1250 SUSPENSION ORAL at 20:39

## 2022-11-26 RX ADMIN — CARBAMAZEPINE 100 MG: 100 SUSPENSION ORAL at 18:05

## 2022-11-26 RX ADMIN — METOCLOPRAMIDE HYDROCHLORIDE 10 MG: 5 SOLUTION ORAL at 18:05

## 2022-11-26 ASSESSMENT — ACTIVITIES OF DAILY LIVING (ADL)
ADLS_ACUITY_SCORE: 53

## 2022-11-26 NOTE — PLAN OF CARE
Pt. Alert, HECTOR orientation- nonverbal. Able to communicate with shaking head yes/no and occasional verbal no's. Vital signs stable on RA ex bradycardia, EKG obtained this AM and confirmed sinus hector. Assist of 2 to reposition q2h. Tolerating TF, restarted at 1000. Lung sounds clear but dim. Bowel sounds active, + flatus. Incontient of BM, adequate urine output via external catheter. Small area of non-blanchable redness to buttocks, offloaded q2h and pulsate mattress in place. Denies pain. Denies nausea. Pot replaced, awaiting recheck. Mag and phos WDL, recheck in AM. Tele SB.

## 2022-11-26 NOTE — PLAN OF CARE
Pt A&Ox1. CMS intact. Non verbal . VSS on RA. Lungs diminished. Resistant to care. Total care. Repo Q2h. Denies pain. Incontinent B&B. External cath in place. Will continue to monitor.
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
## 2022-11-26 NOTE — PLAN OF CARE
Pt Alert and nonverbal, HECTOR oriented. VSS except bradycardic in 40-50's at rest. Ls clr diminished at bases.  Strict NPO,with continues TF at 60ml/hr w 100ml/4hr water flushes. Mouth dry, w/ white patchy,Oral cares done x2, pt refuses and pushes swaps/suction away at times. Abd soft,BS+.Incontinent B/B, external cath in place with adequate output.loose BM x1 this shift.AX2 with lift,total care,Repositioned q2hrs.mobility severly impaired,both feet & Rhand w/contractures.  mepilex to  upper cocyx/sacrum intact.PIV SL.

## 2022-11-27 LAB
ANION GAP SERPL CALCULATED.3IONS-SCNC: 7 MMOL/L (ref 3–14)
BACTERIA BLD CULT: ABNORMAL
BACTERIA BLD CULT: ABNORMAL
BUN SERPL-MCNC: 18 MG/DL (ref 7–30)
CALCIUM SERPL-MCNC: 7.9 MG/DL (ref 8.5–10.1)
CHLORIDE BLD-SCNC: 109 MMOL/L (ref 94–109)
CO2 SERPL-SCNC: 27 MMOL/L (ref 20–32)
CREAT SERPL-MCNC: 0.95 MG/DL (ref 0.66–1.25)
GFR SERPL CREATININE-BSD FRML MDRD: >90 ML/MIN/1.73M2
GLUCOSE BLD-MCNC: 142 MG/DL (ref 70–99)
GLUCOSE BLDC GLUCOMTR-MCNC: 105 MG/DL (ref 70–99)
GLUCOSE BLDC GLUCOMTR-MCNC: 120 MG/DL (ref 70–99)
GLUCOSE BLDC GLUCOMTR-MCNC: 123 MG/DL (ref 70–99)
GLUCOSE BLDC GLUCOMTR-MCNC: 126 MG/DL (ref 70–99)
GLUCOSE BLDC GLUCOMTR-MCNC: 136 MG/DL (ref 70–99)
GLUCOSE BLDC GLUCOMTR-MCNC: 139 MG/DL (ref 70–99)
GLUCOSE BLDC GLUCOMTR-MCNC: 147 MG/DL (ref 70–99)
GLUCOSE BLDC GLUCOMTR-MCNC: 148 MG/DL (ref 70–99)
MAGNESIUM SERPL-MCNC: 2.3 MG/DL (ref 1.6–2.3)
PHOSPHATE SERPL-MCNC: 3.3 MG/DL (ref 2.5–4.5)
POTASSIUM BLD-SCNC: 3.3 MMOL/L (ref 3.4–5.3)
POTASSIUM BLD-SCNC: 3.5 MMOL/L (ref 3.4–5.3)
POTASSIUM BLD-SCNC: 4.1 MMOL/L (ref 3.4–5.3)
SODIUM SERPL-SCNC: 143 MMOL/L (ref 133–144)

## 2022-11-27 PROCEDURE — 99232 SBSQ HOSP IP/OBS MODERATE 35: CPT | Performed by: HOSPITALIST

## 2022-11-27 PROCEDURE — 250N000013 HC RX MED GY IP 250 OP 250 PS 637: Performed by: INTERNAL MEDICINE

## 2022-11-27 PROCEDURE — 250N000011 HC RX IP 250 OP 636: Performed by: HOSPITALIST

## 2022-11-27 PROCEDURE — 94640 AIRWAY INHALATION TREATMENT: CPT | Mod: 76

## 2022-11-27 PROCEDURE — 94640 AIRWAY INHALATION TREATMENT: CPT

## 2022-11-27 PROCEDURE — 250N000009 HC RX 250: Performed by: HOSPITALIST

## 2022-11-27 PROCEDURE — 83735 ASSAY OF MAGNESIUM: CPT | Performed by: HOSPITALIST

## 2022-11-27 PROCEDURE — 999N000157 HC STATISTIC RCP TIME EA 10 MIN

## 2022-11-27 PROCEDURE — 250N000013 HC RX MED GY IP 250 OP 250 PS 637: Performed by: HOSPITALIST

## 2022-11-27 PROCEDURE — 120N000013 HC R&B IMCU

## 2022-11-27 PROCEDURE — 36415 COLL VENOUS BLD VENIPUNCTURE: CPT | Performed by: HOSPITALIST

## 2022-11-27 PROCEDURE — 84132 ASSAY OF SERUM POTASSIUM: CPT | Performed by: PSYCHIATRY & NEUROLOGY

## 2022-11-27 PROCEDURE — 84132 ASSAY OF SERUM POTASSIUM: CPT | Performed by: INTERNAL MEDICINE

## 2022-11-27 PROCEDURE — 36415 COLL VENOUS BLD VENIPUNCTURE: CPT | Performed by: INTERNAL MEDICINE

## 2022-11-27 PROCEDURE — 84100 ASSAY OF PHOSPHORUS: CPT | Performed by: HOSPITALIST

## 2022-11-27 PROCEDURE — 80048 BASIC METABOLIC PNL TOTAL CA: CPT | Performed by: HOSPITALIST

## 2022-11-27 RX ORDER — PIPERACILLIN SODIUM, TAZOBACTAM SODIUM 3; .375 G/15ML; G/15ML
3.38 INJECTION, POWDER, LYOPHILIZED, FOR SOLUTION INTRAVENOUS EVERY 6 HOURS
Status: COMPLETED | OUTPATIENT
Start: 2022-11-27 | End: 2022-11-27

## 2022-11-27 RX ORDER — POTASSIUM CHLORIDE 20MEQ/15ML
40 LIQUID (ML) ORAL ONCE
Status: COMPLETED | OUTPATIENT
Start: 2022-11-27 | End: 2022-11-27

## 2022-11-27 RX ADMIN — ACETYLCYSTEINE 2 ML: 200 SOLUTION ORAL; RESPIRATORY (INHALATION) at 19:35

## 2022-11-27 RX ADMIN — CARBAMAZEPINE 150 MG: 100 SUSPENSION ORAL at 11:04

## 2022-11-27 RX ADMIN — Medication 176 MG: at 08:45

## 2022-11-27 RX ADMIN — ACETYLCYSTEINE 2 ML: 200 SOLUTION ORAL; RESPIRATORY (INHALATION) at 15:12

## 2022-11-27 RX ADMIN — ALBUTEROL SULFATE 2.5 MG: 2.5 SOLUTION RESPIRATORY (INHALATION) at 07:53

## 2022-11-27 RX ADMIN — INSULIN ASPART 1 UNITS: 100 INJECTION, SOLUTION INTRAVENOUS; SUBCUTANEOUS at 14:19

## 2022-11-27 RX ADMIN — ALBUTEROL SULFATE 2.5 MG: 2.5 SOLUTION RESPIRATORY (INHALATION) at 15:12

## 2022-11-27 RX ADMIN — BRIVARACETAM 100 MG: 10 SOLUTION ORAL at 08:47

## 2022-11-27 RX ADMIN — HYDROCORTISONE 15 MG: 5 TABLET ORAL at 08:53

## 2022-11-27 RX ADMIN — ACETYLCYSTEINE 2 ML: 200 SOLUTION ORAL; RESPIRATORY (INHALATION) at 07:54

## 2022-11-27 RX ADMIN — CARBAMAZEPINE 100 MG: 100 SUSPENSION ORAL at 17:15

## 2022-11-27 RX ADMIN — MULTIVITAMIN 15 ML: LIQUID ORAL at 08:54

## 2022-11-27 RX ADMIN — Medication 40 MG: at 10:26

## 2022-11-27 RX ADMIN — CARBAMAZEPINE 150 MG: 100 SUSPENSION ORAL at 05:57

## 2022-11-27 RX ADMIN — METOCLOPRAMIDE HYDROCHLORIDE 10 MG: 5 SOLUTION ORAL at 08:39

## 2022-11-27 RX ADMIN — ALBUTEROL SULFATE 2.5 MG: 2.5 SOLUTION RESPIRATORY (INHALATION) at 19:35

## 2022-11-27 RX ADMIN — CALCIUM CARBONATE 1250 MG: 1250 SUSPENSION ORAL at 14:22

## 2022-11-27 RX ADMIN — HYDROCORTISONE 5 MG: 5 TABLET ORAL at 14:18

## 2022-11-27 RX ADMIN — CALCIUM CARBONATE 1250 MG: 1250 SUSPENSION ORAL at 08:46

## 2022-11-27 RX ADMIN — POTASSIUM CHLORIDE 40 MEQ: 20 SOLUTION ORAL at 10:26

## 2022-11-27 RX ADMIN — BRIVARACETAM 100 MG: 10 SOLUTION ORAL at 21:14

## 2022-11-27 RX ADMIN — PIPERACILLIN AND TAZOBACTAM 3.38 G: 3; .375 INJECTION, POWDER, FOR SOLUTION INTRAVENOUS at 14:18

## 2022-11-27 RX ADMIN — PIPERACILLIN AND TAZOBACTAM 3.38 G: 3; .375 INJECTION, POWDER, FOR SOLUTION INTRAVENOUS at 19:28

## 2022-11-27 RX ADMIN — OXYCODONE HYDROCHLORIDE AND ACETAMINOPHEN 1000 MG: 500 TABLET ORAL at 08:53

## 2022-11-27 RX ADMIN — METOCLOPRAMIDE HYDROCHLORIDE 10 MG: 5 SOLUTION ORAL at 11:04

## 2022-11-27 RX ADMIN — PIPERACILLIN AND TAZOBACTAM 3.38 G: 3; .375 INJECTION, POWDER, FOR SOLUTION INTRAVENOUS at 08:36

## 2022-11-27 RX ADMIN — CARBAMAZEPINE 150 MG: 100 SUSPENSION ORAL at 23:50

## 2022-11-27 RX ADMIN — Medication 50 MCG: at 08:53

## 2022-11-27 RX ADMIN — LEVOTHYROXINE SODIUM 150 MCG: 150 TABLET ORAL at 08:53

## 2022-11-27 RX ADMIN — METOCLOPRAMIDE HYDROCHLORIDE 10 MG: 5 SOLUTION ORAL at 15:48

## 2022-11-27 RX ADMIN — CARBAMAZEPINE 150 MG: 100 SUSPENSION ORAL at 00:16

## 2022-11-27 RX ADMIN — PIPERACILLIN AND TAZOBACTAM 3.38 G: 3; .375 INJECTION, POWDER, FOR SOLUTION INTRAVENOUS at 02:19

## 2022-11-27 RX ADMIN — CALCIUM CARBONATE 1250 MG: 1250 SUSPENSION ORAL at 19:42

## 2022-11-27 RX ADMIN — METOCLOPRAMIDE HYDROCHLORIDE 10 MG: 5 SOLUTION ORAL at 21:14

## 2022-11-27 RX ADMIN — ALBUTEROL SULFATE 2.5 MG: 2.5 SOLUTION RESPIRATORY (INHALATION) at 11:31

## 2022-11-27 RX ADMIN — ACETYLCYSTEINE 2 ML: 200 SOLUTION ORAL; RESPIRATORY (INHALATION) at 11:31

## 2022-11-27 ASSESSMENT — ACTIVITIES OF DAILY LIVING (ADL)
ADLS_ACUITY_SCORE: 53

## 2022-11-27 NOTE — PLAN OF CARE
Goal Outcome Evaluation:  Pt Alert and nonverbal, HECTOR oriented. Responds with head nods and thumbs up. VSS on RA.  Ls clr diminished at bases.  Tele NSR- w/BBB, SB at times.Strict NPO,TF running at 60ml/hr for 22hrs. Oral cares done . Abd soft,BS+., flatus+ .Incontinent B/B, external cath h in place with adequate output.loose BM x1 this shift.  mepilex to  upper cocyx/sacrum intact. Repositioned q2hrs. PIV SL.  On mag,pot,phos protocol, recheck in am.
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
## 2022-11-27 NOTE — PLAN OF CARE
Goal Outcome Evaluation:       Neuro: Alert, responds to name, approp responses with thumbs up or down. Denies pain. More movement of left side; minimal movement of right side. No seizure activity.  CV: Reg S1S2, SB-SR 50s to 70s. Pulses all palpable.  Pulm: LS markedly diminished, no adventitious sounds detected. SPO2 >94 on RA. Loose cough with expectoration to back of throat. Sometimes allows oral suctioning to retrieve secretions.  GI: Tolerating TF @ 60/hr. One large loose stool today. Abd soft, active BS. GT/JT intact & functioning well.  : Condom cath changed at 1000 d/t leakage; switched to external catheter @ 1400 d/t leakage. UO adequate; clear darell.  Social: Mom visited for about 3 hours today. Attentive.  Plan: covering MD suggested discharge to home tomorrow, Monday.

## 2022-11-28 ENCOUNTER — APPOINTMENT (OUTPATIENT)
Dept: CARDIOLOGY | Facility: CLINIC | Age: 62
DRG: 871 | End: 2022-11-28
Attending: INTERNAL MEDICINE
Payer: MEDICARE

## 2022-11-28 VITALS
HEIGHT: 72 IN | RESPIRATION RATE: 16 BRPM | OXYGEN SATURATION: 97 % | BODY MASS INDEX: 19.22 KG/M2 | WEIGHT: 141.9 LBS | SYSTOLIC BLOOD PRESSURE: 125 MMHG | TEMPERATURE: 98.2 F | HEART RATE: 57 BPM | DIASTOLIC BLOOD PRESSURE: 59 MMHG

## 2022-11-28 LAB
ANION GAP SERPL CALCULATED.3IONS-SCNC: 5 MMOL/L (ref 3–14)
BUN SERPL-MCNC: 17 MG/DL (ref 7–30)
CALCIUM SERPL-MCNC: 8 MG/DL (ref 8.5–10.1)
CHLORIDE BLD-SCNC: 107 MMOL/L (ref 94–109)
CO2 SERPL-SCNC: 23 MMOL/L (ref 20–32)
CREAT SERPL-MCNC: 0.82 MG/DL (ref 0.66–1.25)
GFR SERPL CREATININE-BSD FRML MDRD: >90 ML/MIN/1.73M2
GLUCOSE BLD-MCNC: 151 MG/DL (ref 70–99)
GLUCOSE BLDC GLUCOMTR-MCNC: 111 MG/DL (ref 70–99)
GLUCOSE BLDC GLUCOMTR-MCNC: 130 MG/DL (ref 70–99)
GLUCOSE BLDC GLUCOMTR-MCNC: 168 MG/DL (ref 70–99)
GLUCOSE BLDC GLUCOMTR-MCNC: 82 MG/DL (ref 70–99)
GLUCOSE BLDC GLUCOMTR-MCNC: 85 MG/DL (ref 70–99)
GLUCOSE BLDC GLUCOMTR-MCNC: 91 MG/DL (ref 70–99)
MAGNESIUM SERPL-MCNC: 2.4 MG/DL (ref 1.6–2.3)
PHOSPHATE SERPL-MCNC: 2.2 MG/DL (ref 2.5–4.5)
POTASSIUM BLD-SCNC: 3.8 MMOL/L (ref 3.4–5.3)
SARS-COV-2 RNA RESP QL NAA+PROBE: POSITIVE
SODIUM SERPL-SCNC: 135 MMOL/L (ref 133–144)

## 2022-11-28 PROCEDURE — 250N000009 HC RX 250: Performed by: HOSPITALIST

## 2022-11-28 PROCEDURE — 250N000013 HC RX MED GY IP 250 OP 250 PS 637: Performed by: HOSPITALIST

## 2022-11-28 PROCEDURE — 250N000013 HC RX MED GY IP 250 OP 250 PS 637: Performed by: INTERNAL MEDICINE

## 2022-11-28 PROCEDURE — 93227 XTRNL ECG REC<48 HR R&I: CPT | Performed by: INTERNAL MEDICINE

## 2022-11-28 PROCEDURE — 36415 COLL VENOUS BLD VENIPUNCTURE: CPT | Performed by: HOSPITALIST

## 2022-11-28 PROCEDURE — 80048 BASIC METABOLIC PNL TOTAL CA: CPT | Performed by: HOSPITALIST

## 2022-11-28 PROCEDURE — 93225 XTRNL ECG REC<48 HRS REC: CPT

## 2022-11-28 PROCEDURE — 999N000157 HC STATISTIC RCP TIME EA 10 MIN

## 2022-11-28 PROCEDURE — 94640 AIRWAY INHALATION TREATMENT: CPT | Mod: 76

## 2022-11-28 PROCEDURE — 99239 HOSP IP/OBS DSCHRG MGMT >30: CPT | Performed by: INTERNAL MEDICINE

## 2022-11-28 PROCEDURE — 94640 AIRWAY INHALATION TREATMENT: CPT

## 2022-11-28 PROCEDURE — 84100 ASSAY OF PHOSPHORUS: CPT | Performed by: HOSPITALIST

## 2022-11-28 PROCEDURE — 120N000013 HC R&B IMCU

## 2022-11-28 PROCEDURE — U0005 INFEC AGEN DETEC AMPLI PROBE: HCPCS | Performed by: INTERNAL MEDICINE

## 2022-11-28 PROCEDURE — 83735 ASSAY OF MAGNESIUM: CPT | Performed by: HOSPITALIST

## 2022-11-28 RX ADMIN — BRIVARACETAM 100 MG: 10 SOLUTION ORAL at 21:20

## 2022-11-28 RX ADMIN — ALBUTEROL SULFATE 2.5 MG: 2.5 SOLUTION RESPIRATORY (INHALATION) at 08:35

## 2022-11-28 RX ADMIN — CALCIUM CARBONATE 1250 MG: 1250 SUSPENSION ORAL at 20:25

## 2022-11-28 RX ADMIN — METOCLOPRAMIDE HYDROCHLORIDE 10 MG: 5 SOLUTION ORAL at 21:20

## 2022-11-28 RX ADMIN — HYDROCORTISONE 15 MG: 5 TABLET ORAL at 09:01

## 2022-11-28 RX ADMIN — HYDROCORTISONE 5 MG: 5 TABLET ORAL at 14:55

## 2022-11-28 RX ADMIN — BRIVARACETAM 100 MG: 10 SOLUTION ORAL at 09:00

## 2022-11-28 RX ADMIN — METOCLOPRAMIDE HYDROCHLORIDE 10 MG: 5 SOLUTION ORAL at 17:03

## 2022-11-28 RX ADMIN — ACETYLCYSTEINE 2 ML: 200 SOLUTION ORAL; RESPIRATORY (INHALATION) at 12:11

## 2022-11-28 RX ADMIN — CALCIUM CARBONATE 1250 MG: 1250 SUSPENSION ORAL at 14:55

## 2022-11-28 RX ADMIN — METOCLOPRAMIDE HYDROCHLORIDE 10 MG: 5 SOLUTION ORAL at 12:48

## 2022-11-28 RX ADMIN — POTASSIUM & SODIUM PHOSPHATES POWDER PACK 280-160-250 MG 1 PACKET: 280-160-250 PACK at 17:03

## 2022-11-28 RX ADMIN — CARBAMAZEPINE 100 MG: 100 SUSPENSION ORAL at 17:03

## 2022-11-28 RX ADMIN — LEVOTHYROXINE SODIUM 150 MCG: 150 TABLET ORAL at 09:01

## 2022-11-28 RX ADMIN — CARBAMAZEPINE 150 MG: 100 SUSPENSION ORAL at 05:55

## 2022-11-28 RX ADMIN — ALBUTEROL SULFATE 2.5 MG: 2.5 SOLUTION RESPIRATORY (INHALATION) at 19:34

## 2022-11-28 RX ADMIN — OXYCODONE HYDROCHLORIDE AND ACETAMINOPHEN 1000 MG: 500 TABLET ORAL at 09:01

## 2022-11-28 RX ADMIN — AMOXICILLIN AND CLAVULANATE POTASSIUM 1 TABLET: 875; 125 TABLET, FILM COATED ORAL at 20:24

## 2022-11-28 RX ADMIN — ALBUTEROL SULFATE 2.5 MG: 2.5 SOLUTION RESPIRATORY (INHALATION) at 15:18

## 2022-11-28 RX ADMIN — ACETYLCYSTEINE 2 ML: 200 SOLUTION ORAL; RESPIRATORY (INHALATION) at 19:34

## 2022-11-28 RX ADMIN — Medication 176 MG: at 09:16

## 2022-11-28 RX ADMIN — ALBUTEROL SULFATE 2.5 MG: 2.5 SOLUTION RESPIRATORY (INHALATION) at 12:11

## 2022-11-28 RX ADMIN — AMOXICILLIN AND CLAVULANATE POTASSIUM 1 TABLET: 875; 125 TABLET, FILM COATED ORAL at 09:01

## 2022-11-28 RX ADMIN — CALCIUM CARBONATE 1250 MG: 1250 SUSPENSION ORAL at 09:16

## 2022-11-28 RX ADMIN — METOCLOPRAMIDE HYDROCHLORIDE 10 MG: 5 SOLUTION ORAL at 09:01

## 2022-11-28 RX ADMIN — MULTIVITAMIN 15 ML: LIQUID ORAL at 09:16

## 2022-11-28 RX ADMIN — POTASSIUM & SODIUM PHOSPHATES POWDER PACK 280-160-250 MG 1 PACKET: 280-160-250 PACK at 09:01

## 2022-11-28 RX ADMIN — POTASSIUM & SODIUM PHOSPHATES POWDER PACK 280-160-250 MG 1 PACKET: 280-160-250 PACK at 12:49

## 2022-11-28 RX ADMIN — ACETYLCYSTEINE 2 ML: 200 SOLUTION ORAL; RESPIRATORY (INHALATION) at 15:18

## 2022-11-28 RX ADMIN — Medication 50 MCG: at 09:01

## 2022-11-28 RX ADMIN — ACETYLCYSTEINE 2 ML: 200 SOLUTION ORAL; RESPIRATORY (INHALATION) at 08:35

## 2022-11-28 RX ADMIN — Medication 40 MG: at 09:00

## 2022-11-28 RX ADMIN — CARBAMAZEPINE 150 MG: 100 SUSPENSION ORAL at 12:49

## 2022-11-28 RX ADMIN — CARBAMAZEPINE 150 MG: 100 SUSPENSION ORAL at 20:25

## 2022-11-28 ASSESSMENT — ACTIVITIES OF DAILY LIVING (ADL)
ADLS_ACUITY_SCORE: 53

## 2022-11-28 NOTE — PROGRESS NOTES
Care Management Discharge Note    Discharge Date: 11/28/2022       Discharge Disposition: Home    Discharge Services: Transportation Services    Discharge DME: None    Discharge Transportation: health plan transportation [facility] stretcher    Private pay costs discussed: Not applicable    PAS Confirmation Code:  NA  Patient/family educated on Medicare website which has current facility and service quality ratings:  NA    Education Provided on the Discharge Plan:  yes  Persons Notified of Discharge Plans: Patient, his Mother Savannah  Patient/Family in Agreement with the Plan: yes     Handoff Referral Completed: Yes    Additional Information:  Patient was Covid negative and positive with retesting this AM.  Patient is reportedly, asymptomatic.  This writer is familiar with patient and living situation from many past admissions.  Have had a few conversations with his Mother Savannah today.  Patient use to have a 12 hour nurse during the day and a 12 hour PCA during the night.  Due to inability to find a nurse to fulfill the day hours, Savannah has been caring for him and is compensated for this.  Savannah noted now that he is Covid positive, his PCA would not be able to care for him, so she will need to provide round the clock care.  Offered to help in finding a short term NH placement to help her and give her a break.  She is declining this as she always has declined placement.  She believes if she was not caring for him he may not be alive today.  Patient has had [facility] in the past.  Savannah would like to have home care again.  [facility] can accept and awaiting orders for home RN/PT/HHA.  Due to difficulty getting patient to clinics, all his appointments have been scheduled virtually.  An appointment has been scheduled for PCP follow-up on 12/1/22.  Patient will need a 48 hour Holter monitor at discharge, as noted on his AVS.    Transportation has been arranged for 8:00 PM.  Requested  time of 7:00 PM, but not available.  If there is a cancellation, [employer] will let us know.  RN caring for patient was updated.  PCS form is completed.      Karen Collins RN   Inpatient Care Management  [phone]

## 2022-11-28 NOTE — DISCHARGE SUMMARY
[facility]    Hospitalist Discharge Summary       Date of Admission:  11/20/2022  Date of Discharge:  11/28/2022  Discharging Provider: Yoni Vicente MD    Discharge Diagnoses   Recurrent aspiration pneumonia with severe sepsis  Gemella bacteremia   Acute hypoxic respiratory failure, resolved  COVID-19, incidental    Follow-ups Needed After Discharge   Follow-up Appointments     Follow-up and recommended labs and tests       Follow up with primary care provider, JACOB BERGERON, within 7 days for   hospital follow- up.  No follow up labs or test are needed.    - Follow up with Cardiology and GI as scheduled           Hospital Course   Keyon Farias is a 62 year old male admitted on 11/20/2022 with recurrent aspiration pneumonia.  On admission presented with fever and  he has had multiple admissions for aspiration pneumonitis in the past. Cxr was done which showed Negative chest. Nothing concerning for pneumonia. Covid 19 , influenza A and B and RSV are negative. Blood cultures  Done on 11/20/2022 1/2 shows gemella species (facultative anaerobe that lives predominantly in mucous membranes). Repeat blood culture from 11/21/2022 have been negative. CT abdomen and pelvis was done which showed  Right middle and lower lobe pneumonia, which may relate to aspiration given history of vomiting. Patient was treated with a course of 8 days of Zosyn/Augmentin. He had low blood pressures on admission which resulted in a brief stay in the ICU, given albumin and midodrine, but never placed on pressors. He improved with antibiotics. He is back to his baseline at discharge, discussed with mother Savannah at bedside.  - Continue PTA tube feeds at discharge  - Additional 6 days of Augmentin per ID recs, provided at discharge    COVID-19 positive 11/28/2022, incidental  Tested positive for COVID-19 via PCR on 11/28 per routine inpatient testing. COVID-19 PCR on 11/20 was negative. Patient had COVID in June  2022. This is likely a true infection. Patient is asymptomatic from a respiratory standpoint at discharge and is stable to discharge home. Mother at bedside updated, she will call PCP for further evaluation if patient develops any respiratory symptoms.     Abdominal free air  Patient with a small amount of free air under his diaphragm.  No recent G-tube exchange or procedure elevation.  Last GJ exchange was in July.   He is due for an EGD after episodes of melena following hospitalizations around that time while on Eliquis.  No evidence of perforation on CT abdomen pelvis. Patient was evaluated by general surgery team and that they are aware that incidental bubbles of free air are likely in part due to patient gastrostomy tube and on exam he does not have any distention and no indication for any surgical intervention. GI also consulted and not concerned.  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  On EUS culture at that time, with necrosis collection measuring 5 cm, cultures were positive for Pseudomonas resistant to Zosyn, ceftazidime, cefepime, VRE resistant to penicillins, and Enterobacter resistant to ampicillin and intermediate to levofloxacin.  Since December imaging, cyst appears to be increasing in size, currently 2.4 cm from 1.9 cm.  - Patient was evaluated by [facility] and his cyst is not 2.4 cm and will need to have follow-up with the [facility] pancreatobiliary service as outpatient    Mild LVOT obstruction-New  ECHO done on 11/21/2022 showed ef of 65-70%, Flow acceleratoin is noted at the LVOT, suggestive of mild LVOT obstruction. SURI is not clearly present. No subvalvular membrane is seen. Peak outflow gradient at rest was 33 mmHg, representing mild obstruction.There is mild (1+) mitral regurgitation.   - Seen by cardiology this admission, they  recommend outpatient Cardiac MRI and 48hr holter monitor.  - Cardiology follow up     Panhypopituitarism  Given stress dose steroids this admission  - Resume PTA regimen at discharge    Spastic hemiplegia  Right-sided paresis.  bedbound and essentially nonverbal at baseline.       GERD: Will continue with PTA PPI     Recent GI bleed  Chronic anemia with baseline around 11-12  Following hospitalization this summer with acute illness, COVID, patient was on Eliquis anticoagulation for DVT prophylaxis.  Developed melena which resolved following discontinuation of Eliquis    Thrombocytopenia-improved  His platelet count has improved from 91 on admission back to 140s      Consultations This Hospital Stay   NUTRITION SERVICES ADULT IP CONSULT  SURGERY GENERAL IP CONSULT  GASTROENTEROLOGY IP CONSULT  PHARMACY IP CONSULT  INTENSIVIST IP CONSULT  VASCULAR ACCESS ADULT IP CONSULT  VASCULAR ACCESS ADULT IP CONSULT  CARE MANAGEMENT / SOCIAL WORK IP CONSULT  WOUND OSTOMY CONTINENCE NURSE  IP CONSULT  CARDIOLOGY IP CONSULT  INFECTIOUS DISEASES IP CONSULT  INTERVENTIONAL RADIOLOGY ADULT/PEDS IP CONSULT    Code Status   Full Code    Time Spent on this Encounter   IYoni, personally saw the patient today and spent approximately 35 minutes discharging this patient.       Yoni Vicente MD  [facility]  ______________________________________________________________________    Physical Exam   Vital Signs: Temp: 97.9  F (36.6  C) Temp src: Axillary BP: 113/56 Pulse: 55   Resp: 16 SpO2: 94 % O2 Device: None (Room air)    Weight: 141 lbs 14.4 oz    Constitutional: Male in NAD  Eyes: Nonicteric, normal ocular movements  HEENT: Normocephalic, atraumatic, oral mucosa dry  Respiratory: CTAB, no wheezing or crackles, mildly ronchorous  Cardiovascular: RRR, normal S1/2, no m/r/g  GI: No organomegaly, normoactive bowel sounds, nontender, nondistended  Skin: No rashes  Musculoskeletal: R  paresis  Neurologic: A&Ox3  Psychiatric: Appropriate affect and mood       Primary Care Physician   JACOB RUBIO    Discharge Disposition   Discharged to home  Condition at discharge: Stable    Significant Results and Procedures   Most Recent 3 CBC's:  Recent Labs   Lab Test 11/26/22  0550 11/25/22 2037 11/23/22  0549   WBC 10.5 8.8 9.5   HGB 12.2* 12.3* 10.8*   MCV 90 90 91   * 148* 103*     Most Recent 3 BMP's:  Recent Labs   Lab Test 11/28/22  1054 11/28/22  0923 11/28/22  0635 11/27/22  1547 11/27/22  1249 11/27/22  1211 11/27/22  0637 11/26/22  0852 11/26/22  0550   NA  --   --  135  --   --   --  143  --  149*   POTASSIUM  --   --  3.8  --  4.1  --  3.5  3.3*   < > 3.2*  3.1*   CHLORIDE  --   --  107  --   --   --  109  --  115*   CO2  --   --  23  --   --   --  27  --  27   BUN  --   --  17  --   --   --  18  --  20   CR  --   --  0.82  --   --   --  0.95  --  0.78   ANIONGAP  --   --  5  --   --   --  7  --  7   STEVE  --   --  8.0*  --   --   --  7.9*  --  8.4*   * 82 151*   < >  --    < > 142*   < > 140*    < > = values in this interval not displayed.     Most Recent 2 LFT's:  Recent Labs   Lab Test 11/23/22  0549 11/22/22 2002   AST 20 24   ALT 21 22   ALKPHOS 61 59   BILITOTAL 0.7 0.2     Most Recent 3 INR's:  Recent Labs   Lab Test 07/05/22 0035 06/16/22 2248 12/27/21  1630   INR 1.15 1.22* 1.28*     Most Recent 3 Troponin's:  Recent Labs   Lab Test 04/15/21  2250 02/19/21  1123 10/25/20  2100   TROPI <0.015 <0.015 0.047*     Most Recent 3 BNP's:  Recent Labs   Lab Test 06/18/22  0327 04/15/21  2250 04/05/19  1109   NTBNPI 1,263* 101 400     Most Recent D-dimer:  Recent Labs   Lab Test 06/18/22  0327   DD 1.07*     Most Recent Cholesterol Panel:  Recent Labs   Lab Test 11/29/16  0430   TRIG 100       Results for orders placed or performed during the hospital encounter of 11/20/22   Chest XR,  PA & LAT    Narrative    EXAM: XR CHEST 2 VIEWS  LOCATION: [facility]  DATE/TIME: 11/20/2022 9:43 PM    INDICATION: fever, non verbal  COMPARISON: 06/18/2022.      Impression    IMPRESSION: Negative chest. Nothing concerning for pneumonia.   CT Abdomen Pelvis w Contrast     Value    Radiologist flags Pneumoperitoneum (AA)    Narrative    EXAM: CT ABDOMEN PELVIS W CONTRAST  LOCATION: [facility]  DATE/TIME: 11/20/2022 11:45 PM    INDICATION: fever, vomiting, hiccups  COMPARISON: CT from 12/06/2021.  TECHNIQUE: CT scan of the abdomen and pelvis was performed following injection of IV contrast. Multiplanar reformats were obtained. Dose reduction techniques were used.  CONTRAST: 76mL Isovue 370    FINDINGS:   LOWER CHEST: Right basilar airspace infiltrate consistent with pneumonia in the middle and lower lobes. Left basilar atelectasis.    HEPATOBILIARY: Normal.    PANCREAS: Interval increase in size of cystic lesion associated with the pancreatic tail measuring 2.4 x 2.1 cm on axial image 69, previously 1.9 x 1.6 cm.    SPLEEN: Normal.    ADRENAL GLANDS: Bilateral adrenal cortical atrophy.    KIDNEYS/BLADDER: Normal kidneys. Bladder wall is diffusely thickened, though the bladder is empty. Very small amount of gas within the bladder lumen.    BOWEL: Percutaneous gastrojejunostomy tube is well-positioned. Anastomotic sutures in the right upper quadrant. Multiple locules of free air in the right upper quadrant, though no definite adjacent bowel wall thickening. Distal colonic diverticulosis.   Large amount of stool and contrast or other hyperdense substance within the rectum.    LYMPH NODES: Normal.    VASCULATURE: Mild atherosclerotic vascular calcification.    PELVIC ORGANS: Soft tissue density in the right inguinal canal likely reflecting the testicle.    MUSCULOSKELETAL: Normal.      Impression    IMPRESSION:   1.  Right middle and lower lobe pneumonia, which may relate to aspiration given history of vomiting.    2.  Free air in the right upper  quadrant. While there are multiple loops of bowel in the right upper quadrant, there is no obvious bowel wall thickening to indicate source of perforation. The free air is in a similar location to the prior CT. A   gastrojejunostomy tube is present though is well-positioned and there is no gas associated with the tube.    3.  Interval enlargement of cystic lesion of the pancreatic tail which measures 2.4 cm on today's study. This could be evaluated further with MRI on an outpatient basis.    4.  Thickening of the bladder wall lumen likely reflecting under distention. Gas within the bladder lumen is of indeterminate significance. Recommend clinical correlation.      [Critical Result: Pneumoperitoneum]    Finding was identified on 11/21/2022 12:09 AM.     1.  Dr. Arguelles was contacted by me on 11/21/2022 12:18 AM and verbalized understanding of the critical result.    IR Gastro Jejunostomy Tube Change    Narrative    IR GASTRO JEJUNOSTOMY TUBE CHANGE   11/25/2022 12:08 PM    HISTORY:  61-year-old patient with long-standing GJ tube. The tube is  plug.    COMPARISON: 7/1/2022    TECHNIQUE: Patient was brought to the interventional radiology  department and informed consent obtained with patient's family.  Patient was placed in a supine position. A stiff Glidewire was passed  through the GJ tube. Over the stiff Glidewire, a  new 18 Turkish TORY  gastrojejunostomy tube was placed. Contrast was injected through both  gastric and jejunal lumens to confirm appropriate position. A spot  fluoroscopic image was saved to document catheter position.    Sedation: None  Fluoroscopy time: 0.7 minutes  Air Kerma: 5.13 mGy   Contrast: 15 mL of Omnipaque 240 administered into the enteric tract  without complication.  Local anesthetic: None  Number spot fluoroscopic images: 1    FINDINGS: Two spot fluoroscopic images confirm appropriate placement  of 18 Turkish TORY gastrojejunostomy tube.      Impression    IMPRESSION: Successful exchange of  18 Hungarian TORY gastrojejunostomy  tube. Tube is ready for immediate use    KATIUSKA MAI MD         SYSTEM ID:  [id number]   Echocardiogram Complete     Value    LVEF  65-70%    [facility]    [id number]  [id number]  [id number]  [id number]^MIKE^CHARLES^MARIANO     [facility]  Echocardiography Laboratory  [address] [place] [address]     Name: BERT BARAJAS  MRN: [id number]  : 1960  Study Date: 2022 03:36 PM  Age: 62 yrs  Gender: Male  Patient Location: [facility]  Reason For Study: Cardiac Murmur  Ordering Physician: CHARLES MCKEON  Referring Physician: Elsa Queen,  Performed By: Nidhi Hurley     BSA: 1.9 m2  Height: 72 in  Weight: 157 lb  HR: 94  BP: 98/51 mmHg  ______________________________________________________________________________  Procedure  Complete Portable Echo Adult.  ______________________________________________________________________________  Interpretation Summary     The left ventricle is normal in size.  Left ventricular systolic function is normal.  Normal left ventricular wall motion  The visual ejection fraction is 65-70%.  Flow acceleratoin is noted at the LVOT, suggestive of mild LVOT obstruction.  SURI is not clearly present. No subvalvular membrane is seen.  Peak outflow gradient at rest was 33 mmHg, representing mild obstruction.  There is mild (1+) mitral regurgitation.     LVOT obstruction was not previously reported.  ______________________________________________________________________________  Left Ventricle  The left ventricle is normal in size. There is normal left ventricular wall  thickness. Flow acceleratoin is noted at the LVOT, suggestive of mild LVOT  obstruction. SURI is not clearly present. No subvalvular membrane is seen. Left  ventricular systolic function is normal. The visual ejection fraction is 65-  70%. Left ventricular diastolic function is normal. Normal left ventricular  wall motion.     Right Ventricle  The right ventricle is normal  in structure, function and size.     Atria  Normal left atrial size. Right atrial size is normal. There is no atrial shunt  seen.     Mitral Valve  The mitral valve is normal in structure and function. There is mild (1+)  mitral regurgitation.     Tricuspid Valve  The tricuspid valve is normal in structure and function. Right ventricle  systolic pressure estimate normal. There is trace tricuspid regurgitation.     Aortic Valve  No aortic regurgitation is present. No hemodynamically significant valvular  aortic stenosis.     Pulmonic Valve  The pulmonic valve is not well seen, but is grossly normal. There is trace  pulmonic valvular regurgitation.     Vessels  Normal size aorta. The inferior vena cava was normal in size with preserved  respiratory variability.     Pericardium  Small posterior pericardial effusion.     ______________________________________________________________________________  MMode/2D Measurements & Calculations  IVSd: 1.0 cm  LVIDd: 3.6 cm  LVIDs: 1.9 cm  LVPWd: 0.94 cm  FS: 48.1 %  LV mass(C)d: 107.1 grams  LV mass(C)dI: 55.7 grams/m2     Ao root diam: 3.7 cm  LA dimension: 3.1 cm  asc Aorta Diam: 3.6 cm  LA/Ao: 0.85  LVOT diam: 2.5 cm  LVOT area: 4.9 cm2  LA Volume (BP): 48.0 ml  LA Volume Index (BP): 25.0 ml/m2  RWT: 0.52     Doppler Measurements & Calculations  MV E max brandie: 95.1 cm/sec  MV A max brandie: 87.3 cm/sec  MV E/A: 1.1  MV dec slope: 692.3 cm/sec2  MV dec time: 0.14 sec     Ao V2 max: 197.9 cm/sec  Ao max P.0 mmHg  Ao V2 mean: 153.4 cm/sec  Ao mean PG: 10.1 mmHg  Ao V2 VTI: 41.0 cm  PA acc time: 0.06 sec  E/E' av.3  Lateral E/e': 11.2  Medial E/e': 13.3     ______________________________________________________________________________  Report approved by: Gerald Martines 2022 06:00 PM             Discharge Orders      Follow-Up with Cardiology [facility]      Home Care Referral      Reason for your hospital stay    You were hospitalized for aspiration pneumonia, you improved  with antibiotics. You also have COVID-19.     Follow-up and recommended labs and tests     Follow up with primary care provider, JACOB BERGERON, within 7 days for hospital follow- up.  No follow up labs or test are needed.    - Follow up with Cardiology and GI as scheduled     Activity    Your activity upon discharge: activity as tolerated     Discharge Instructions    Keyon tested positive for COVID on 11/28/2022. He has no symptoms. He is recommended to quarantine for 10 days. If he develops any respiratory symptoms, please call PCP for evaluation and possible Paxlovid prescription.     Resume Home Care Services     Holter Monitor 48 hour Adult Pediatric    Place at discharge     Adult Holter Monitor 48 hour     Diet    Follow this diet upon discharge:       Adult Formula Drip Feeding: Continuous Jevity 1.5; Jejunostomy; Goal Rate: 60 mL/hr x 22 hrs; mL/hr      NPO for Medical/Clinical Reasons Except for: No Exceptions     MRI Cardiac w/contrast     Discharge Medications   Current Discharge Medication List      START taking these medications    Details   amoxicillin-clavulanate (AUGMENTIN) 875-125 MG tablet 1 tablet by Per J Tube route every 12 hours for 6 days  Qty: 12 tablet, Refills: 0    Associated Diagnoses: Aspiration pneumonia of right lower lobe, unspecified aspiration pneumonia type (H)         CONTINUE these medications which have NOT CHANGED    Details   acetaminophen (TYLENOL) 650 MG CR tablet Take 650 mg by mouth every 8 hours as needed for mild pain or fever      acetylcysteine (MUCOMYST) 20 % neb solution Take 2 mLs by nebulization 4 times daily With albuterol at 0700, 1100, 1500, and 1900      bacitracin ointment Apply topically daily as needed for wound care To PEG site.      Brivaracetam (BRIVIACT) 10 MG/ML solution 100 mg by Oral or Feeding Tube route 2 times daily 0900, 2100      calcium carbonate 1250 MG/5ML SUSP suspension Take 1,250 mg by mouth 3 times daily 0900, 1500, 2100      !!  carBAMazepine (TEGRETOL) 100 MG/5ML suspension Take 100 mg by mouth daily Take at 1800      !! carBAMazepine (TEGRETOL) 100 MG/5ML suspension 150 mg by Oral or Feeding Tube route 3 times daily At 06:00, 12:00, and 24:00 for seizures      guaiFENesin (MUCINEX) 600 MG 12 hr tablet Take 1,200 mg by mouth 2 times daily as needed for congestion      hydrocortisone (CORTAID) 1 % external cream Apply topically 2 times daily as needed Apply to reddened memo areas as needed      hydrocortisone (CORTEF) 5 MG tablet Take 15 mg (3 tablets) in the morning and 5 mg (1 tablet)  at 2:00 PM. During illness patient takes more as a stress dose. Please increase the dose as directed.  Qty: 400 tablet, Refills: 3    Associated Diagnoses: Secondary adrenal insufficiency (H)      levothyroxine (SYNTHROID/LEVOTHROID) 150 MCG tablet TAKE 1 TABLET(150 MCG) BY MOUTH DAILY  Qty: 90 tablet, Refills: 3    Associated Diagnoses: Secondary hypothyroidism      metoclopramide (REGLAN) 10 MG/10ML SOLN solution Take 10 mg by mouth 4 times daily (before meals and nightly) 0800, 1200, 1600, 2000  Disconnects bag before administration, then waits 45 mins before reconnecting after giving the medication      multivitamin, therapeutic (THERA-VIT) TABS tablet Take 1 tablet by mouth daily      mupirocin (BACTROBAN) 2 % external ointment Apply topically 2 times daily as needed  Qty: 30 g, Refills: 1    Associated Diagnoses: Panhypopituitarism (H); Hypogonadism male      pantoprazole (PROTONIX) 2 mg/mL SUSP suspension 20 mLs (40 mg) by Per J Tube route daily  Qty: 400 mL, Refills: 0    Comments: Future refills by PCP Dr. Carlos Gomez with phone number [phone].  Associated Diagnoses: Gastroesophageal reflux disease, esophagitis presence not specified      testosterone cypionate (DEPOTESTOSTERONE) 200 MG/ML injection Inject 0.3 mLs (60 mg) into the muscle every 7 days New dose  Qty: 4 mL, Refills: 1    Associated Diagnoses: Panhypopituitarism (H);  Hypogonadism male      vitamin C (ASCORBIC ACID) 1000 MG TABS 1,000 mg by Oral or Feeding Tube route daily       vitamin D3 (CHOLECALCIFEROL) 2000 units (50 mcg) tablet Take 2,000 Units by mouth daily Crush and feed via j-tube @@ 0900      albuterol (PROVENTIL) (5 MG/ML) 0.5% neb solution Take 2.5 mg by nebulization every 4 hours (while awake) 0700 1100 1500 1900 with mucomyst       !! - Potential duplicate medications found. Please discuss with provider.        Allergies   Allergies   Allergen Reactions     Valproic Acid Other (See Comments)     Toxicity w/ bone marrow suspension, elevated ammonia levels      Dilantin [Phenytoin Sodium] Other (See Comments)     Severe Trembling     Scopolamine Hives     Hives with the patch - oral no problem

## 2022-11-28 NOTE — PLAN OF CARE
Pt Alert and nonverbal, HECTOR oriented. Responds with head nods and thumbs up. R-sided weakness. VSS on RA. Ls clr diminished at bases.  Tele SB/SR  50-70's  Abd soft,BS+., flatus+ .Incontinent B/B, external cath in place,leaky at times. AUOP. Large loose BM x1 this shift.  mepilex to  upper cocyx/sacrum intact. Repositioned q2hrs. PIV SL.  Strict NPO, tolerating TF at 60ml/hr w 100ml/4hr water flushes.JT not running briefly unclogged with water flushes, patent and running at this time.  Pt reluctant with oral cares.     Plan: possible discharge to home with family and help services on 11/28

## 2022-11-28 NOTE — PLAN OF CARE
Pt utters incomprehensible sounds, responds to name by shaking head and thumbs up. VSS on RA, Tele SR/SB. Lungs diminished. Decreased movement on R side compared to L. Tolerating TF at 60mL/hr, free water flush q4h. Periodically tolerates oral cares and suctioning. Abdomen soft, GJ tube intact. Incontinent B&B, external cath in place. BS+, BM-. Mepliex on coccyx, turn/repo q2h. Continue IV abx. Potential discharge to home 11/28.

## 2022-11-29 ENCOUNTER — TELEPHONE (OUTPATIENT)
Dept: FAMILY MEDICINE | Facility: CLINIC | Age: 62
End: 2022-11-29

## 2022-11-29 DIAGNOSIS — K21.9 GASTROESOPHAGEAL REFLUX DISEASE, UNSPECIFIED WHETHER ESOPHAGITIS PRESENT: Primary | ICD-10-CM

## 2022-11-29 LAB
ATRIAL RATE - MUSE: 50 BPM
DIASTOLIC BLOOD PRESSURE - MUSE: NORMAL MMHG
INTERPRETATION ECG - MUSE: NORMAL
P AXIS - MUSE: 61 DEGREES
PR INTERVAL - MUSE: 140 MS
QRS DURATION - MUSE: 92 MS
QT - MUSE: 522 MS
QTC - MUSE: 475 MS
R AXIS - MUSE: -49 DEGREES
SYSTOLIC BLOOD PRESSURE - MUSE: NORMAL MMHG
T AXIS - MUSE: 65 DEGREES
VENTRICULAR RATE- MUSE: 50 BPM

## 2022-11-29 RX ORDER — CALCIUM CARBONATE 1250 MG/5ML
SUSPENSION ORAL
Qty: 473 ML | Refills: 0 | Status: SHIPPED | OUTPATIENT
Start: 2022-11-29 | End: 2023-01-06

## 2022-11-29 ASSESSMENT — ACTIVITIES OF DAILY LIVING (ADL): ADLS_ACUITY_SCORE: 53

## 2022-11-29 NOTE — PLAN OF CARE
Pt alert but non-verbal unable to access orientation, able to answer some yes or no questions by shaking head.VSS on RA.TELE sinus hector.Lung Sounds diminished.Bowel Sounds active, 2 loose BM's.Voiding adequately. Blanchable redness to coccyx, no sting barrier and mepilex in place. Pt repositioned frequently.Pt shakes head no when asked about pain. Strict NPO, TF running at 60ml hr.   Transport scheduled for 2000

## 2022-11-29 NOTE — TELEPHONE ENCOUNTER
MIKEY Hernandez from [facility] called.  Pt recently discharged from hospital.  Has a Hospital follow up phone visit with Dr. Queen 12/1/22  Gave verbal ok to see pt on 1st or 2nd, as this was already ordered in hospital for PT start. ST and [facility].    If any changes we can call back.  PT will do initial eval for if pt also needs Nursing, otherwise advise if you want pt to have nursing and we can call back.     Mary   [phone]

## 2022-11-29 NOTE — PROGRESS NOTES
19:000 07:30 AM    Received patient,alert and oriented,non verbal.On room air.Vital signs stable.Feeding ongoing thru J- tube,all due medications given.    22:00:    EMT arrived, hand over given and discharge instructions and medication given.Left the hospital via stretcher at 22:20 H.

## 2022-11-30 ENCOUNTER — PATIENT OUTREACH (OUTPATIENT)
Dept: CARE COORDINATION | Facility: CLINIC | Age: 62
End: 2022-11-30

## 2022-11-30 ENCOUNTER — MEDICAL CORRESPONDENCE (OUTPATIENT)
Dept: HEALTH INFORMATION MANAGEMENT | Facility: CLINIC | Age: 62
End: 2022-11-30

## 2022-11-30 ENCOUNTER — TELEPHONE (OUTPATIENT)
Dept: CARDIOLOGY | Facility: CLINIC | Age: 62
End: 2022-11-30

## 2022-11-30 NOTE — TELEPHONE ENCOUNTER
Patient was admitted to [facility] on 11/20/22 with fever, vomiting, with XR consistent with aspiration PNA confirmed by CT. Also noted to have enlarging pancreatic cyst vs necrosis. +BCX and elevated lactate, findings all consistent with sepsis. Echo showed findings as below and cardiology was consulted.    COVID-19 positive 11/28/2022, incidental.    PMH: DVT, TBI with aphasia, GI bleed, R sided spastic hemiplegia and dysphagia with GJ - tube for TF's/meds; seizure disorder; panhypopituitarism and recurrent urosepsis and aspiration PNA.    Echo showed EF of 65-70% with hyperdynamic LV function and chordal SURI (systolic anterior motion of the mitral apparatus) and this can create a gradient during systole. There is proximal septal thickening noted on imaging that can be seen in a variant of HOCM. LVOT obstruction: differential diagnosis HOCM.    Pt was started on ABX at time of discharge. Per cardiology recommendations, pt was discharged wearing a 48 hr Holter Monitor and plan for OP cMRI.    Called patient to discuss any post hospital d/c questions he may have and confirm f/u appts, but listed phone number is his mother Savannah' anabela and she answered.    RN confirmed mother that patient that he needs to be scheduled for OP cMRI and cardiology RADHA OV as ordered-informed we will have our scheduling dept call to schedule these appts with her as she provides pt's transportation. Pt was discharged to home in care of his mother and [facility] services.     She verbalized understanding and agreed with plan. JOHN Cole RN.

## 2022-11-30 NOTE — PROGRESS NOTES
"Clinic Care Coordination Contact  [facility]: Post-Discharge Note  SITUATION                                                      Admission:    Admission Date: 11/20/22   Reason for Admission: Fever  Discharge:   Discharge Date: 11/28/22  Discharge Diagnosis: Recurrent aspiration pneumonia with severe sepsis    BACKGROUND                                                      Per hospital discharge summary and inpatient provider notes:Keyon Farias is a 62 year old male admitted on 11/20/2022 with recurrent aspiration pneumonia.  On admission presented with fever and  he has had multiple admissions for aspiration pneumonitis in the past. Cxr was done which showed Negative chest. Nothing concerning for pneumonia. Covid 19 , influenza A and B and RSV are negative. Blood cultures  Done on 11/20/2022 1/2 shows gemella species (facultative anaerobe that lives predominantly in mucous membranes). Repeat blood culture from 11/21/2022 have been negative. CT abdomen and pelvis was done which showed  Right middle and lower lobe pneumonia, which may relate to aspiration given history of vomiting. Patient was treated with a course of 8 days of Zosyn/Augmentin. He had low blood pressures on admission which resulted in a brief stay in the ICU, given albumin and midodrine, but never placed on pressors. He improved with antibiotics. He is back to his baseline at discharge, discussed with mother Savannah at bedside.      ASSESSMENT           Discharge Assessment  How are you doing now that you are home?: \" Sleeping well doing great \"  How are your symptoms? (Red Flag symptoms escalate to triage hotline per guidelines): Improved  Do you feel your condition is stable enough to be safe at home until your provider visit?: Yes  Does the patient have their discharge instructions? : Yes  Does the patient have questions regarding their discharge instructions? : No  Were you started on any new medications or were there changes to any of " your previous medications? : Yes  Does the patient have all of their medications?: Yes  Do you have questions regarding any of your medications? : No  Do you have all of your needed medical supplies or equipment (DME)?  (i.e. oxygen tank, CPAP, cane, etc.): Yes  Discharge follow-up appointment scheduled within 14 calendar days? : Yes  Discharge Follow Up Appointment Date: 12/01/22  Discharge Follow Up Appointment Scheduled with?: Primary Care Provider    Post-op ([facility] CTA Only)  If the patient had a surgery or procedure, do they have any questions for a nurse?: No        PLAN                                                      Outpatient Plan: Follow-up and recommended labs and tests       Follow up with primary care provider, ELSA QUEEN, within 7 days for   hospital follow- up.  No follow up labs or test are needed.    - Follow up with Cardiology and GI as scheduled       Future Appointments   Date Time Provider Department [place]   12/1/2022 11:30 AM Elsa Queen MD [facility]    2/8/2023 12:30 PM Faizan Mclean MD [facility]         For any urgent concerns, please contact our 24 hour nurse triage line: [phone] ([phone])         Golden Lopes

## 2022-12-01 ENCOUNTER — MEDICAL CORRESPONDENCE (OUTPATIENT)
Dept: HEALTH INFORMATION MANAGEMENT | Facility: CLINIC | Age: 62
End: 2022-12-01

## 2022-12-01 ENCOUNTER — TRANSFERRED RECORDS (OUTPATIENT)
Dept: FAMILY MEDICINE | Facility: CLINIC | Age: 62
End: 2022-12-01

## 2022-12-01 ENCOUNTER — VIRTUAL VISIT (OUTPATIENT)
Dept: FAMILY MEDICINE | Facility: CLINIC | Age: 62
End: 2022-12-01
Payer: MEDICARE

## 2022-12-01 DIAGNOSIS — L89.303 PRESSURE INJURY OF BUTTOCK, STAGE 3, UNSPECIFIED LATERALITY (H): ICD-10-CM

## 2022-12-01 DIAGNOSIS — H10.023 PINK EYE DISEASE OF BOTH EYES: Primary | ICD-10-CM

## 2022-12-01 DIAGNOSIS — H10.023 PINK EYE DISEASE OF BOTH EYES: ICD-10-CM

## 2022-12-01 DIAGNOSIS — J69.0 ASPIRATION PNEUMONITIS (H): ICD-10-CM

## 2022-12-01 DIAGNOSIS — Z93.1 G TUBE FEEDINGS (H): ICD-10-CM

## 2022-12-01 PROBLEM — L89.309 PRESSURE INJURY OF SKIN OF BUTTOCK, UNSPECIFIED INJURY STAGE, UNSPECIFIED LATERALITY: Status: ACTIVE | Noted: 2022-12-01

## 2022-12-01 PROCEDURE — 99443 PR PHYSICIAN TELEPHONE EVALUATION 21-30 MIN: CPT | Mod: 95 | Performed by: INTERNAL MEDICINE

## 2022-12-01 RX ORDER — ACETYLCYSTEINE 200 MG/ML
2 SOLUTION ORAL; RESPIRATORY (INHALATION) 4 TIMES DAILY
Qty: 10 ML | Refills: 3 | Status: SHIPPED | OUTPATIENT
Start: 2022-12-01 | End: 2023-02-22

## 2022-12-01 RX ORDER — ALBUTEROL SULFATE 5 MG/ML
2.5 SOLUTION RESPIRATORY (INHALATION)
Qty: 20 ML | Refills: 3 | Status: SHIPPED | OUTPATIENT
Start: 2022-12-01 | End: 2023-02-22

## 2022-12-01 RX ORDER — AZELASTINE HYDROCHLORIDE 0.5 MG/ML
1 SOLUTION/ DROPS OPHTHALMIC 2 TIMES DAILY
Qty: 1 ML | Refills: 0 | Status: SHIPPED | OUTPATIENT
Start: 2022-12-01 | End: 2022-12-02

## 2022-12-01 RX ORDER — BACITRACIN ZINC 500 [USP'U]/G
OINTMENT TOPICAL DAILY PRN
Qty: 30 G | Refills: 3 | Status: SHIPPED | OUTPATIENT
Start: 2022-12-01

## 2022-12-01 NOTE — PROGRESS NOTES
Aravind is a 62 year old who is being evaluated via a billable telephone visit.      What phone number would you like to be contacted at? [phone]  How would you like to obtain your AVS? MyChart    Assessment & Plan     Pink eye disease of both eyes  For pink eye.   - azelastine (OPTIVAR) 0.05 % ophthalmic solution; Apply 1 drop to eye 2 times daily for 10 days    Aspiration pneumonitis (H)  Discussed with his mom, only give albuterol nebulizer when needed.   Can give mucomyst as needed.  - acetylcysteine (MUCOMYST) 20 % neb solution; Take 2 mLs by nebulization 4 times daily With albuterol at 0700, 1100, 1500, and 1900  - albuterol (PROVENTIL) (5 MG/ML) 0.5% neb solution; Take 0.5 mLs (2.5 mg) by nebulization every 4 hours (while awake) 0700 1100 1500 1900 with mucomyst    G tube feedings (H)  - bacitracin 500 UNIT/GM external ointment; Apply topically daily as needed for wound care To PEG site.    Pressure injury of skin of buttock, stage 3  Apply moisturizing cream, will have homecare nurse evaluate    He needs pressure relieving air mattress for pressure sore on both buttocks.   On exam, bilateral buttock pressure sore was seen.   Face to face exam was done.      MED REC REQUIRED  Post Medication Reconciliation Status:  Discharge medications reconciled, continue medications without change    See Patient Instructions    Return in about 6 months (around 6/1/2023) for Follow up, with me.    JACOB BERGERON MD  [facility]   Arvaind is a 62 year old, presenting for the following health issues:  Hospital F/U    HPI   Aravind is a 62 year old gentleman who had a virtual visit today.  I talked to his mother who is the primary caregiver, as aravind is non-verbal and has spastic paraplegia.   He was recently admitted to the hospital for acute illness. He has hx of recurrent aspiration pneumonia.   He was discharged home on abx.    Today, he is tired, no fever, vitals are as mentioned below.   BP  "110/70, No fever, O2 94%, Pulse 75    He has pink eye since the hospital discharge.    His mother reports that he has a sore bottom. Looks like a pressure sore.   PT is visiting at this time. She suggested referral to homecare nurse, I gave a verbal ok.     His mom asking for refills on mucomyst neb and albuterol neb. She gives him these nebulizer treatment multiple times daily however she doesn't know why he is on them. He does not have asthma.     Post Discharge Outreach 11/30/2022   Admission Date 11/20/2022   Reason for Admission Fever   Discharge Date 11/28/2022   Discharge Diagnosis Recurrent aspiration pneumonia with severe sepsis   How are you doing now that you are home? \" Sleeping well doing great \"   How are your symptoms? (Red Flag symptoms escalate to triage hotline per guidelines) Improved   Do you feel your condition is stable enough to be safe at home until your provider visit? Yes   Does the patient have their discharge instructions?  Yes   Does the patient have questions regarding their discharge instructions?  No   Were you started on any new medications or were there changes to any of your previous medications?  Yes   Does the patient have all of their medications? Yes   Do you have questions regarding any of your medications?  No   Do you have all of your needed medical supplies or equipment (DME)?  (i.e. oxygen tank, CPAP, cane, etc.) Yes   Discharge follow-up appointment scheduled within 14 calendar days?  Yes   Discharge Follow Up Appointment Date 12/1/2022   Discharge Follow Up Appointment Scheduled with? Primary Care Provider     Hospital Follow-up Visit:    Hospital/Nursing Home/IP Rehab Facility: [facility]  Date of Admission: 11/20/22  Date of Discharge: 11/28/22  Reason(s) for Admission:   Recurrent aspiration pneumonia with severe sepsis  Gemella bacteremia   Acute hypoxic respiratory failure, resolved  COVID-19, incidental    Was your hospitalization related " to COVID-19? YES   How are you feeling today? No change  In the past 24 hours have you had shortness of breath when speaking, walking, or climbing stairs? I don't have breathing problems  Do you have a cough? Yes, I have a cough but it's not worse  When is the last time you had a fever greater than 100? none  Are you having any other symptoms? Diarrhea and Fatigue   Do you have any other stressors you would like to discuss with your provider? No    Was the patient in the ICU or did the patient experience delirium during hospitalization?  No    Problems taking medications regularly:  None  Medication changes since discharge: None  Problems adhering to non-medication therapy:  None    Summary of hospitalization:  [facility] discharge summary reviewed  Diagnostic Tests/Treatments reviewed.  Follow up needed: none  Other Healthcare Providers Involved in Patient s Care:         Homecare  Update since discharge: improved.         Plan of care communicated with caregiver     Review of Systems       Objective       Vitals:  No vitals were obtained today due to virtual visit.    Physical Exam   GEN: No acute distress  RESP: No audible increased work of breathing. Patient speaking in full sentences without distress.  PSYCH: pleasant  Exam otherwise limited due to virtual platform          Phone call duration: 30 minutes

## 2022-12-02 RX ORDER — AZELASTINE HYDROCHLORIDE 0.5 MG/ML
SOLUTION/ DROPS OPHTHALMIC
Qty: 6 ML | Refills: 0 | Status: SHIPPED | OUTPATIENT
Start: 2022-12-02 | End: 2023-05-10

## 2022-12-02 NOTE — TELEPHONE ENCOUNTER
"Routing refill request to provider for review/approval because:  Originally ordered for 1ml. Pharmacy asking for \"90 days supply\" which is not appropriate, but did pend for 6ml, as per the reference in medication, this only comes in 6ml.    Please approve if appropriate.   Raissa Recio RN      "

## 2022-12-05 ENCOUNTER — TELEPHONE (OUTPATIENT)
Dept: FAMILY MEDICINE | Facility: CLINIC | Age: 62
End: 2022-12-05

## 2022-12-05 NOTE — TELEPHONE ENCOUNTER
The Home Care/Assisted Living/Nursing Facility is calling regarding an established patient.  Has the patient seen Home Care in the past or is currently residing in Assisted Living or Nursing Facility? Yes.     Anya  calling from [employer] requesting the following orders that are within the Home Care, Assisted Living or Nursing Home Eval and Treatment standing order and can be signed as standing order signature required by RN.    Preferred Call Back Number: [phone] confidential line    PT/OT/Speech Therapy  1x/3w for dysphagia    Any additional Orders:  Are there any orders requested, not stated above, that are outside of the standing order and must be routed to a licensed practitioner for approval?    No    Writer has verified [employer] will send fax to have orders signed.      Malina CASTRO RN  EP Triage

## 2022-12-06 ENCOUNTER — TELEPHONE (OUTPATIENT)
Dept: CARDIOLOGY | Facility: CLINIC | Age: 62
End: 2022-12-06

## 2022-12-06 NOTE — TELEPHONE ENCOUNTER
Received call from patients mother wondering the results of the monitor. Pts mother informed and RN said that it has been sent to Dr. Washington and will let her know if she has any recommendations or concerns.   48 hour holter monitor:       Pt scheduled for cardiac MRI 12/28/22. Will route to Dr. Washington to review.

## 2022-12-08 ENCOUNTER — TELEPHONE (OUTPATIENT)
Dept: FAMILY MEDICINE | Facility: CLINIC | Age: 62
End: 2022-12-08

## 2022-12-08 NOTE — LETTER
Keyon Farias    1960      ATTN: [facility]       Keyon Farias is under my care at [facility].     He requires Pressure relieving air mattress for stage 2 pressure sores on right and left buttocks.           Dr Elsa Queen  [facility]

## 2022-12-08 NOTE — TELEPHONE ENCOUNTER
Order/Referral Request     Who is requesting: TAMMY Alcantara     Orders being requested: Pressure-relieving air mattress for stage two pressure sores on right and left buttocks.    OT requests that addendum be added to 12/01/22 VV with PCP, as insurance needs a statement in the clinical notes from a face-to-face as to why the patient needs the mattress.     When are orders needed by: asap. Please fax to [facility] at [phone]     Has this been discussed with Provider: No    Callback to Delphine: [phone] ok to leave a detailed vm       Orin Bradley, RN  -[facility]

## 2022-12-08 NOTE — TELEPHONE ENCOUNTER
Order/Referral Request    Who is requesting: TAMMY Alcantara    Orders being requested: 1 x time a week every other week for 8 weeks, positioning and wheelchair management, upper body range of motion    When are orders needed by: asap    Has this been discussed with Provider: Brook

## 2022-12-10 ENCOUNTER — MEDICAL CORRESPONDENCE (OUTPATIENT)
Dept: HEALTH INFORMATION MANAGEMENT | Facility: CLINIC | Age: 62
End: 2022-12-10

## 2022-12-12 NOTE — TELEPHONE ENCOUNTER
Gege Washington MD  You 3 hours ago (10:29 AM)     CF  Thanks! Overall benign Holter monitor. No arrhythmias, heart block. Good news!   Dr. Washington

## 2022-12-14 DIAGNOSIS — Z53.9 DIAGNOSIS NOT YET DEFINED: Primary | ICD-10-CM

## 2022-12-14 PROCEDURE — G0180 MD CERTIFICATION HHA PATIENT: HCPCS | Performed by: INTERNAL MEDICINE

## 2022-12-14 NOTE — TELEPHONE ENCOUNTER
Call to Sherri, occupational therapy with [facility] at [phone]. Detailed message left with Dr. Queen's below response, ok with requested orders.       Shaneka Rader, RN BSN MSN  [facility]

## 2022-12-15 ENCOUNTER — TELEPHONE (OUTPATIENT)
Dept: FAMILY MEDICINE | Facility: CLINIC | Age: 62
End: 2022-12-15

## 2022-12-15 NOTE — TELEPHONE ENCOUNTER
"Spoke with [facility]   12/5/22 - nurse went out and did not add calendar     Travel nurse was the one who went out     Travel nurse put in that a \"Zara RN\" approved under Dr Queen - can't see this - for wound care     Requesting 1 x/week for seven weeks     Verbal given on requested orders     Brooke GOMEZ, Triage RN  [facility]     "

## 2022-12-16 ENCOUNTER — TELEPHONE (OUTPATIENT)
Dept: FAMILY MEDICINE | Facility: CLINIC | Age: 62
End: 2022-12-16

## 2022-12-16 DIAGNOSIS — L89.309 PRESSURE INJURY OF SKIN OF BUTTOCK, UNSPECIFIED INJURY STAGE, UNSPECIFIED LATERALITY: Primary | ICD-10-CM

## 2022-12-16 NOTE — TELEPHONE ENCOUNTER
Family is looking for DME orders for pressure reliving mattress ,  wheel chair and shower chair.      DME order started.       Yvette Barraza RN  -[facility]

## 2022-12-16 NOTE — TELEPHONE ENCOUNTER
The Home Care/Assisted Living/Nursing Facility is calling regarding an established patient.  Has the patient seen Home Care in the past or is currently residing in Assisted Living or Nursing Facility? Yes.     Ashia calling from [facility]  requesting the following orders that are within the Home Care, Assisted Living or Nursing Home Eval and Treatment standing order and can be signed as standing order signature required by RN.    Preferred Call Back Number: [phone]    Home Care Visits Continuation    Any additional Orders:  Are there any orders requested, not stated above, that are outside of the standing order and must be routed to a licensed practitioner for approval?    No    Writer has verified [employer] will send fax to have orders signed.    Yvette Barraza RN  -[facility]

## 2022-12-20 ENCOUNTER — MEDICAL CORRESPONDENCE (OUTPATIENT)
Dept: HEALTH INFORMATION MANAGEMENT | Facility: CLINIC | Age: 62
End: 2022-12-20

## 2022-12-20 NOTE — TELEPHONE ENCOUNTER
See other encounter    Brooke GOMEZ, Triage RN  [facility]

## 2022-12-20 NOTE — TELEPHONE ENCOUNTER
Spoke with Delphine OT     Insurance won't accept a letter for this, must be a signed/faxed DME order prescription     Needs:     1) DME order for pressure-relieving air mattress for stage two pressure sores on right and left buttocks.    2) addendum be added to 12/01/22 VV with PCP, as insurance needs a statement in the clinical notes from a face-to-face as to why the patient needs the mattress.    Faxed to [facility] at (f) [phone]    Callback to Delphine: [phone] ok to leave a detailed vm    Last wound eval 12/16 by home care     Pt has:   Stage 2 right lower buttocks  Stage 2 left lower buttocks   Recently slightly worsened     OT assisted with pending orders for DME - see pended order     Brooke GOMEZ, Triage RN  [facility]

## 2022-12-21 ENCOUNTER — TELEPHONE (OUTPATIENT)
Dept: FAMILY MEDICINE | Facility: CLINIC | Age: 62
End: 2022-12-21

## 2022-12-21 NOTE — TELEPHONE ENCOUNTER
JONY Mclean PT with [facility] called to update provider of pt cancelled PT visit today. Pt plans to have future PT visit after new years. No further action needed at this time.

## 2022-12-22 ENCOUNTER — MEDICAL CORRESPONDENCE (OUTPATIENT)
Dept: HEALTH INFORMATION MANAGEMENT | Facility: CLINIC | Age: 62
End: 2022-12-22

## 2022-12-28 ENCOUNTER — TELEPHONE (OUTPATIENT)
Dept: RADIOLOGY | Facility: CLINIC | Age: 62
End: 2022-12-28

## 2022-12-28 ENCOUNTER — MEDICAL CORRESPONDENCE (OUTPATIENT)
Dept: HEALTH INFORMATION MANAGEMENT | Facility: CLINIC | Age: 62
End: 2022-12-28

## 2022-12-29 ENCOUNTER — TELEPHONE (OUTPATIENT)
Dept: CARDIOLOGY | Facility: CLINIC | Age: 62
End: 2022-12-29

## 2022-12-29 NOTE — TELEPHONE ENCOUNTER
That's OK. We will defer it.   It will be helpful to get some information about any cardiac family history, as well as the reported personal history of ventricular fibrillation from his mother/caretaker prior to our visit - thanks!    Shea Duncan PA-C  [facility]

## 2022-12-29 NOTE — TELEPHONE ENCOUNTER
Message from chart prep team:  Dyana Kim LPN  [facility] Heart Team 2  Patient is scheduled to see Shea on 1/2/23. FYI, patient showed up for MR yesterday but was unable to handle the breathing exercises. See note from Radiology.     Dyana Joel LPN     Per MRI tech 12/28/2022: Pt and his mother Savannah were here for cardiac MRI. Pt was unable to follow breathing instructions. Savannah felt like he would not be able to tolerate a 90 min scan as well    Patient was discharged 11/28/2022:  Recurrent aspiration pneumonia with severe sepsis  Gemella bacteremia   Acute hypoxic respiratory failure  Spastic hemiplegia  Right-sided paresis.  bedbound and essentially nonverbal at baseline.   Plan  ECHO done on 11/21/2022 showed ef of 65-70%, Flow acceleratoin is noted at the LVOT, suggestive of mild LVOT obstruction. SURI is not clearly present. No subvalvular membrane is seen. Peak outflow gradient at rest was 33 mmHg, representing mild obstruction.There is mild (1+) mitral regurgitation.   - Seen by cardiology this admission, they recommend outpatient Cardiac MRI and 48hr holter monitor.  - Cardiology follow up    Patient has discharge visit scheduled with RADHA Shea Duncan on 1/2/2023.    Will message RADHA Duncan to review

## 2022-12-30 NOTE — TELEPHONE ENCOUNTER
Attempted to contact patient's mother, left a message to review that MRI will not be re-ordered at this time. Asked mother to call back with any information about the Vfib history.

## 2023-01-04 ENCOUNTER — TELEPHONE (OUTPATIENT)
Dept: FAMILY MEDICINE | Facility: CLINIC | Age: 63
End: 2023-01-04

## 2023-01-04 DIAGNOSIS — R68.89 EXCESSIVE ORAL SECRETIONS: Primary | ICD-10-CM

## 2023-01-04 NOTE — TELEPHONE ENCOUNTER
Patient's mother Savannah (legal guardian) calling on behalf of the patient to request an rx from PCP to manage patient's oral secretions.    States they had been using scopolamine patches that were prescribed in the hospital but the patch irritated the patient's skin.    Savannah is wondering if a pill form medication that PCP could presribe for patient to use to manage secretions? PCP please advise    Patient's pharmacy:    [facility]  [address] [place] [id number]   [phone]    Callback to Savannah [phone] - ok to leave detailed VM     Stephane Velasquez RN  [facility]

## 2023-01-05 ENCOUNTER — MEDICAL CORRESPONDENCE (OUTPATIENT)
Dept: FAMILY MEDICINE | Facility: CLINIC | Age: 63
End: 2023-01-05

## 2023-01-05 RX ORDER — GLYCOPYRROLATE 1 MG/1
1 TABLET ORAL 2 TIMES DAILY PRN
Qty: 180 TABLET | Refills: 1 | Status: SHIPPED | OUTPATIENT
Start: 2023-01-05 | End: 2024-01-08

## 2023-01-05 NOTE — TELEPHONE ENCOUNTER
Reached out to Julia for recommendations as oral scopolamine is not available   She recommends Glycopyrrolate 1-2 mg bid as needed. Patient has taken this in the past.   Sent to pharmacy. Let them know

## 2023-01-06 NOTE — TELEPHONE ENCOUNTER
Rx pended for oral compounded scopolamine capsules.  Is he able to take medications by mouth or are they being administered via feeding tube?  If via feeding tube, Rx directions will need to be updated to reflect that.  Justin HortonD, [facility]  Medication Therapy Management Provider  Pager: [phone]

## 2023-01-06 NOTE — TELEPHONE ENCOUNTER
Writer called patient's legal guardian Savannah and reviewed MTM and PCP recommendation below.    Savannah expressed verbal understanding and is agreeable.    No further questions or concerns at this time.    Stephane Velasquez RN  [facility]

## 2023-01-06 NOTE — TELEPHONE ENCOUNTER
"Call to patient's mother, legal guardian. Mother informed of Dr. Queen's below response. Mother verbalized understanding but requests oral Scopolamine. Mother wants to know why this is not available and asks this RN to reach out to [facility].     [facility]. Spoke with Manish.   Manish reports they can compound 0.4 mg and 0.8 mg capsules of Scopolamine. The provider would need to use the compound template and in directions field put in the drug strength and dosage form.    All that is commercially available is the transdermal patches.     Call to patient's mother. Mother informed of the above.   Mother reports she will \"check out\" the Glycopyrrolate but wants to know if an oral route for Scopolamine can be sent.   Please advise.       Shaneka Rader, RN BSN MSN  [facility]           "

## 2023-01-06 NOTE — TELEPHONE ENCOUNTER
Cherri Dumont,     How can we prescribe oral scopolamine for Keyon, can you suggest dose/frequency    thanks

## 2023-01-06 NOTE — TELEPHONE ENCOUNTER
Writer called and spoke with patient's legal guardian Savannah and notified that rx was sent by PCP.    Savannah states the strength should be 0.8 mg TID.    Please advise if medication is available in 0.8 mg capsules?    Stephane Velasquez RN  [facility]

## 2023-01-06 NOTE — TELEPHONE ENCOUNTER
The dosing range is 0.25-0.8 mg 3 times daily as needed and tolerated.  It looks like he's had both 0.4 and 0.8mg in the past.  Would recommend starting on the low end and increasing if needed, but will defer to Dr. Queen.  Julia Charles, PharmD, [facility]  Medication Therapy Management Provider  Pager: [phone]

## 2023-01-09 ENCOUNTER — APPOINTMENT (OUTPATIENT)
Dept: GENERAL RADIOLOGY | Facility: CLINIC | Age: 63
End: 2023-01-09
Attending: EMERGENCY MEDICINE
Payer: MEDICARE

## 2023-01-09 ENCOUNTER — HOSPITAL ENCOUNTER (EMERGENCY)
Facility: CLINIC | Age: 63
Discharge: HOME | End: 2023-01-10
Attending: EMERGENCY MEDICINE | Admitting: EMERGENCY MEDICINE
Payer: MEDICARE

## 2023-01-09 ENCOUNTER — APPOINTMENT (OUTPATIENT)
Dept: CT IMAGING | Facility: CLINIC | Age: 63
End: 2023-01-09
Attending: EMERGENCY MEDICINE
Payer: MEDICARE

## 2023-01-09 DIAGNOSIS — M62.81 GENERALIZED MUSCLE WEAKNESS: ICD-10-CM

## 2023-01-09 LAB
ANION GAP SERPL CALCULATED.3IONS-SCNC: 8 MMOL/L (ref 3–14)
BASOPHILS # BLD AUTO: 0.1 10E3/UL (ref 0–0.2)
BASOPHILS NFR BLD AUTO: 1 %
BUN SERPL-MCNC: 16 MG/DL (ref 7–30)
CALCIUM SERPL-MCNC: 8.6 MG/DL (ref 8.5–10.1)
CHLORIDE BLD-SCNC: 102 MMOL/L (ref 94–109)
CO2 SERPL-SCNC: 28 MMOL/L (ref 20–32)
CREAT SERPL-MCNC: 0.8 MG/DL (ref 0.66–1.25)
EOSINOPHIL # BLD AUTO: 0.6 10E3/UL (ref 0–0.7)
EOSINOPHIL NFR BLD AUTO: 8 %
ERYTHROCYTE [DISTWIDTH] IN BLOOD BY AUTOMATED COUNT: 13.9 % (ref 10–15)
FLUAV RNA SPEC QL NAA+PROBE: NEGATIVE
FLUBV RNA RESP QL NAA+PROBE: NEGATIVE
GFR SERPL CREATININE-BSD FRML MDRD: >90 ML/MIN/1.73M2
GLUCOSE BLD-MCNC: 108 MG/DL (ref 70–99)
HCT VFR BLD AUTO: 43.4 % (ref 40–53)
HGB BLD-MCNC: 13.8 G/DL (ref 13.3–17.7)
IMM GRANULOCYTES # BLD: 0 10E3/UL
IMM GRANULOCYTES NFR BLD: 0 %
LACTATE SERPL-SCNC: 1.5 MMOL/L (ref 0.7–2)
LYMPHOCYTES # BLD AUTO: 3.2 10E3/UL (ref 0.8–5.3)
LYMPHOCYTES NFR BLD AUTO: 42 %
MCH RBC QN AUTO: 28.9 PG (ref 26.5–33)
MCHC RBC AUTO-ENTMCNC: 31.8 G/DL (ref 31.5–36.5)
MCV RBC AUTO: 91 FL (ref 78–100)
MONOCYTES # BLD AUTO: 0.8 10E3/UL (ref 0–1.3)
MONOCYTES NFR BLD AUTO: 10 %
NEUTROPHILS # BLD AUTO: 2.9 10E3/UL (ref 1.6–8.3)
NEUTROPHILS NFR BLD AUTO: 39 %
NRBC # BLD AUTO: 0 10E3/UL
NRBC BLD AUTO-RTO: 0 /100
PLATELET # BLD AUTO: 175 10E3/UL (ref 150–450)
POTASSIUM BLD-SCNC: 4 MMOL/L (ref 3.4–5.3)
RBC # BLD AUTO: 4.78 10E6/UL (ref 4.4–5.9)
RSV RNA SPEC NAA+PROBE: NEGATIVE
SARS-COV-2 RNA RESP QL NAA+PROBE: NEGATIVE
SODIUM SERPL-SCNC: 138 MMOL/L (ref 133–144)
WBC # BLD AUTO: 7.6 10E3/UL (ref 4–11)

## 2023-01-09 PROCEDURE — 71046 X-RAY EXAM CHEST 2 VIEWS: CPT

## 2023-01-09 PROCEDURE — 250N000013 HC RX MED GY IP 250 OP 250 PS 637: Performed by: EMERGENCY MEDICINE

## 2023-01-09 PROCEDURE — 80048 BASIC METABOLIC PNL TOTAL CA: CPT | Performed by: EMERGENCY MEDICINE

## 2023-01-09 PROCEDURE — 85025 COMPLETE CBC W/AUTO DIFF WBC: CPT | Performed by: EMERGENCY MEDICINE

## 2023-01-09 PROCEDURE — 87637 SARSCOV2&INF A&B&RSV AMP PRB: CPT | Performed by: EMERGENCY MEDICINE

## 2023-01-09 PROCEDURE — 87040 BLOOD CULTURE FOR BACTERIA: CPT | Performed by: EMERGENCY MEDICINE

## 2023-01-09 PROCEDURE — 99285 EMERGENCY DEPT VISIT HI MDM: CPT | Mod: CS,25

## 2023-01-09 PROCEDURE — 36415 COLL VENOUS BLD VENIPUNCTURE: CPT | Performed by: EMERGENCY MEDICINE

## 2023-01-09 PROCEDURE — C9803 HOPD COVID-19 SPEC COLLECT: HCPCS

## 2023-01-09 PROCEDURE — 83605 ASSAY OF LACTIC ACID: CPT | Performed by: EMERGENCY MEDICINE

## 2023-01-09 PROCEDURE — 74177 CT ABD & PELVIS W/CONTRAST: CPT | Mod: MG

## 2023-01-09 RX ORDER — IOPAMIDOL 755 MG/ML
71 INJECTION, SOLUTION INTRAVASCULAR ONCE
Status: COMPLETED | OUTPATIENT
Start: 2023-01-09 | End: 2023-01-10

## 2023-01-09 RX ADMIN — BRIVARACETAM 100 MG: 10 SOLUTION ORAL at 22:03

## 2023-01-09 ASSESSMENT — ACTIVITIES OF DAILY LIVING (ADL)
ADLS_ACUITY_SCORE: 37
ADLS_ACUITY_SCORE: 37

## 2023-01-10 VITALS
RESPIRATION RATE: 14 BRPM | TEMPERATURE: 97.2 F | BODY MASS INDEX: 18.99 KG/M2 | OXYGEN SATURATION: 91 % | HEART RATE: 88 BPM | WEIGHT: 140 LBS | DIASTOLIC BLOOD PRESSURE: 80 MMHG | SYSTOLIC BLOOD PRESSURE: 129 MMHG

## 2023-01-10 PROCEDURE — 250N000011 HC RX IP 250 OP 636: Performed by: EMERGENCY MEDICINE

## 2023-01-10 PROCEDURE — 250N000009 HC RX 250: Performed by: EMERGENCY MEDICINE

## 2023-01-10 RX ADMIN — SODIUM CHLORIDE 60 ML: 900 INJECTION INTRAVENOUS at 00:12

## 2023-01-10 RX ADMIN — IOPAMIDOL 71 ML: 755 INJECTION, SOLUTION INTRAVENOUS at 00:08

## 2023-01-10 ASSESSMENT — ACTIVITIES OF DAILY LIVING (ADL)
ADLS_ACUITY_SCORE: 37
ADLS_ACUITY_SCORE: 37

## 2023-01-10 NOTE — ED NOTES
"Pt's Mom, Savannah updated via phone. Pt 's Mom stated \"he understands when you talk to him he just can't talk back, but he will give a thumbs up\".  Discharge paperwork reviewed with Savannah over the phone. All questions encouraged and answered.   "

## 2023-01-10 NOTE — ED NOTES
Bed: ED26  Expected date: 1/9/23  Expected time: 8:25 PM  Means of arrival: Ambulance  Comments:  Kerri 1 62M fever; non verbal TBI pt

## 2023-01-10 NOTE — ED PROVIDER NOTES
History     Chief Complaint:  Cough and Fatigue (Pt's Mother is caregiver and told EMS pt is listless today, less interactive and has a cough. )       HPI   Keyon Farias is a 62 year old male who presents with cough and fatigue. The patient has been more lethargic and tired for the past 2-3 days. The mother thought that he was improving but he vomited phlegm today. His armpit temperature was between 98-99 degrees when it is normally 97 for him. Mother denies other sick contacts. He does not take anything by mouth, and is wheelchair bound.    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    Social History:   reports that he quit smoking about 33 years ago. He has never used smokeless tobacco. He reports that he does not drink alcohol and does not use drugs.  PCP: Elsa Queen     Physical Exam     Patient Vitals for the past 24 hrs:   BP  Pulse Resp SpO2   01/10/23 0100 129/80 88 14 91 %   01/10/23 0030 107/70 87 14 92 %   01/09/23 2330 112/68 92 14 94 %   01/09/23 2315 100/74 93 16 94 %        Physical Exam  GENERAL: alert, makes eye contact. Awake, incomprhensible sounds. Looks consistent with prior TBI  HEAD: atraumatic  EYES: pupils reactive, extraocular muscles intact, conjunctivae normal  ENT:  mucus membranes moist  NECK:  trachea midline, normal range of motion  RESPIRATORY: no tachypnea. A few coarse breath sounds when he coughs.  CVS: normal S1/S2, no murmurs, intact distal pulses  ABDOMEN: soft, nontender, nondistention. Feeding tube to the mid abdomen.  MUSCULOSKELETAL: decreased muscle mass throughout.  SKIN: warm and dry, no acute rashes or ulceration  NEURO: alert, makes eye contact, non verbal, no motor abilities, does smile or appears to smile at times when engaging him  PSYCH:  Appears stable    Emergency Department Course   ECG:  ECG results from 12/28/22   EKG 12-lead, tracing only     Value    Systolic Blood Pressure     Diastolic Blood Pressure     Ventricular Rate 94    Atrial Rate 94    WY Interval 160    QRS Duration 84        QTc 475    P Axis 71    R AXIS -52    T Axis 49    Interpretation ECG      Sinus rhythm with occasional Premature ventricular complexes  Left anterior fascicular block  Abnormal ECG  When compared with ECG of 09-JAN-2023 20:12, (unconfirmed)  Significant changes have occurred  Confirmed by GENERATED REPORT, COMPUTER ([id number]),  Yana Okeefe ([id number]) on 1/9/2023 10:23:40 PM         Imaging:  CT Abdomen Pelvis w Contrast   Final Result   IMPRESSION:    1.  Foci of free gas in the right upper quadrant adjacent to the hepatic flexure, unchanged. Exact site of perforation is not identified. Percutaneous gastrojejunostomy, satisfactory positioning. Formed stool material within normal caliber colon.    Scattered colonic diverticulosis.      2.  Cystic lesion in the tail of the pancreas, slightly more  apparent on current study. This can be better evaluated with dedicated MRCP.      3.  Findings discussed with the referring physician, Dr. Avila, following the study at 0055 hours.      NOTE: ABNORMAL REPORT      THE DICTATION ABOVE DESCRIBES AN ABNORMALITY FOR WHICH FOLLOW-UP IS NEEDED.       XR Chest 2 Views   Final Result   IMPRESSION: Heart size within normal limits. Pulmonary vascularity normal. Elevated right hemidiaphragm is unchanged. Subsegmental atelectasis both lower lung. No acute infiltrates or effusions. There is air beneath the right hemidiaphragm. Although this    could be located in the hepatic flexure of the colon, free intraperitoneal air is not excluded. CT recommended for further evaluation.          Report per radiology    Laboratory:  Labs Ordered and Resulted from Time of ED Arrival to Time of ED Departure   BASIC METABOLIC PANEL - Abnormal       Result Value    Sodium 138      Potassium 4.0      Chloride 102      Carbon Dioxide (CO2) 28      Anion Gap 8      Urea Nitrogen 16      Creatinine 0.80      Calcium 8.6      Glucose 108 (*)     GFR Estimate >90     LACTIC ACID WHOLE BLOOD - Normal    Lactic Acid 1.5     INFLUENZA A/B & SARS-COV2 PCR MULTIPLEX - Normal    Influenza A PCR Negative      Influenza B PCR Negative      RSV PCR Negative      SARS CoV2 PCR Negative     CBC WITH PLATELETS AND DIFFERENTIAL    WBC Count 7.6      RBC Count 4.78      Hemoglobin 13.8      Hematocrit 43.4      MCV 91      MCH 28.9      MCHC 31.8      RDW 13.9      Platelet Count 175      % Neutrophils 39      % Lymphocytes 42      % Monocytes 10      % Eosinophils 8      % Basophils 1      % Immature Granulocytes 0      NRBCs per 100 WBC 0      Absolute Neutrophils 2.9      Absolute Lymphocytes 3.2      Absolute Monocytes 0.8      Absolute Eosinophils 0.6      Absolute Basophils 0.1      Absolute Immature Granulocytes 0.0      Absolute NRBCs 0.0          Procedures   na    Emergency Department Course &  Assessments:             Interventions:  Medications   Brivaracetam (BRIVIACT) solution 100 mg (100 mg Oral Given 1/9/23 2203)   iopamidol (ISOVUE-370) solution 71 mL (71 mLs Intravenous Given 1/10/23 0008)   Saline Flush (60 mLs Intravenous Given 1/10/23 0012)        Independent Interpretation (X-rays, CTs, rhythm strip):  Review of chest xray     Consultations/Discussion of Management or Tests:  2050 I obtained history and examined patient.  2105 I spoke with the patient's mother to discuss the patient's findings, presentation, and plan of care.         Social Determinants of Health affecting care:  Non verbal, history reviewed in chart and discussion with mother at mom     Disposition:  The patient was discharged to home.     Impression & Plan    CMS Diagnoses: None      Medical Decision Making:  Patient presents from home were mom was concerned he seemed more tired, had an episode of coughing on phlegm and wanted him checked out.  I reviewed his chart and spoke to his mother at the beginning and end of his care.  He is well known to the department and myself with issues of aspiration pneumonia.  He looks well from times that I have seen him in the past, his pulse ox is normal and vital signs are normal.  Labs are stable.  Chest xray does not show pneumonia but shows free air and suggests CT.  CT shows small free air, but looks similar and unchanged to all prior imaging without abscess, a normal placed G tube, and no inflammatory findings and considered chronic given lack of other findings and similar findings on prior exams.    Critical Care time:  was 0 minutes for this patient excluding procedures.    Diagnosis:    ICD-10-CM    1. Generalized muscle weakness  M62.81            Discharge Medications:  Discharge Medication List as of 1/10/2023  1:41 AM           Scribe Disclosure:  Ivis DYKESed, am serving as a scribe at 8:54 PM on 1/9/2023 to document services personally performed by Randy Avila MD  based on my observations and the provider's statements to me.     1/9/2023   Randy Avila MD Adams, Shaun L, MD  01/10/23 6601

## 2023-01-10 NOTE — ED TRIAGE NOTES
Pt arrives form home via EMS. Pt's mother is his caregiver at home and she told EMS that pt is listless today, not as interactive and has a cough. Pt presents with congested cough, O2 sats 94-95% on RA and rhonchi breath sounds. Pt history of TBI.      Triage Assessment       Row Name 01/09/23 2032       Respiratory WDL    Respiratory WDL X  Pt arrives wit congested cough, Rhonchi breath sounds, 94-95% on RA       Skin Circulation/Temperature WDL    Skin Circulation/Temperature WDL WDL       Cardiac WDL    Cardiac WDL WDL       Peripheral/Neurovascular WDL    Peripheral Neurovascular WDL WDL       Cognitive/Neuro/Behavioral WDL    Cognitive/Neuro/Behavioral WDL X

## 2023-01-12 ENCOUNTER — TELEPHONE (OUTPATIENT)
Dept: FAMILY MEDICINE | Facility: CLINIC | Age: 63
End: 2023-01-12
Payer: MEDICARE

## 2023-01-12 DIAGNOSIS — J69.0 ASPIRATION PNEUMONITIS (H): Primary | ICD-10-CM

## 2023-01-12 NOTE — TELEPHONE ENCOUNTER
DME order faxed to [facility] in [place] at [phone].    Aislinn LUJAN MA on 1/12/2023 at 2:11 PM

## 2023-01-12 NOTE — TELEPHONE ENCOUNTER
Medication Question or Refill    What medication are you calling about (include dose and sig)?: nebulizer machine    Controlled Substance Agreement on file:   CSA -- Patient Level:    CSA: None found at the patient level.       Who prescribed the medication?: Pt's mom unsure    Do you need a refill? Yes: machine won't turn on    When did you use the medication last? Several months ago    Patient offered an appointment? No    Do you have any questions or concerns?  No    Preferred Pharmacy:   [facility] in [place]  Ph: [phone]  [place] ext [phone]    Okay to leave a detailed message?: Yes at Home number on file [phone] (home)

## 2023-01-15 ENCOUNTER — APPOINTMENT (OUTPATIENT)
Dept: GENERAL RADIOLOGY | Facility: CLINIC | Age: 63
End: 2023-01-15
Attending: EMERGENCY MEDICINE
Payer: MEDICARE

## 2023-01-15 ENCOUNTER — HOSPITAL ENCOUNTER (EMERGENCY)
Facility: CLINIC | Age: 63
Discharge: HOME | End: 2023-01-16
Attending: EMERGENCY MEDICINE | Admitting: EMERGENCY MEDICINE
Payer: MEDICARE

## 2023-01-15 ENCOUNTER — APPOINTMENT (OUTPATIENT)
Dept: CT IMAGING | Facility: CLINIC | Age: 63
End: 2023-01-15
Attending: EMERGENCY MEDICINE
Payer: MEDICARE

## 2023-01-15 DIAGNOSIS — R50.9 FEVER IN ADULT: ICD-10-CM

## 2023-01-15 DIAGNOSIS — T17.998A MUCUS PLUG IN RESPIRATORY TRACT: ICD-10-CM

## 2023-01-15 DIAGNOSIS — R05.9 COUGH IN ADULT: ICD-10-CM

## 2023-01-15 LAB
ALBUMIN SERPL-MCNC: 3.4 G/DL (ref 3.4–5)
ALP SERPL-CCNC: 93 U/L (ref 40–150)
ALT SERPL W P-5'-P-CCNC: 27 U/L (ref 0–70)
ANION GAP SERPL CALCULATED.3IONS-SCNC: 6 MMOL/L (ref 3–14)
AST SERPL W P-5'-P-CCNC: 26 U/L (ref 0–45)
ATRIAL RATE - MUSE: 112 BPM
BACTERIA BLD CULT: NO GROWTH
BASOPHILS # BLD AUTO: 0.1 10E3/UL (ref 0–0.2)
BASOPHILS NFR BLD AUTO: 1 %
BILIRUB SERPL-MCNC: 0.8 MG/DL (ref 0.2–1.3)
BUN SERPL-MCNC: 20 MG/DL (ref 7–30)
CALCIUM SERPL-MCNC: 8.8 MG/DL (ref 8.5–10.1)
CHLORIDE BLD-SCNC: 100 MMOL/L (ref 94–109)
CO2 SERPL-SCNC: 31 MMOL/L (ref 20–32)
CREAT SERPL-MCNC: 1.05 MG/DL (ref 0.66–1.25)
DIASTOLIC BLOOD PRESSURE - MUSE: NORMAL MMHG
EOSINOPHIL # BLD AUTO: 0.4 10E3/UL (ref 0–0.7)
EOSINOPHIL NFR BLD AUTO: 4 %
ERYTHROCYTE [DISTWIDTH] IN BLOOD BY AUTOMATED COUNT: 14.1 % (ref 10–15)
FLUAV RNA SPEC QL NAA+PROBE: NEGATIVE
FLUBV RNA RESP QL NAA+PROBE: NEGATIVE
GFR SERPL CREATININE-BSD FRML MDRD: 81 ML/MIN/1.73M2
GLUCOSE BLD-MCNC: 184 MG/DL (ref 70–99)
HCO3 BLDV-SCNC: 32 MMOL/L (ref 21–28)
HCT VFR BLD AUTO: 48.8 % (ref 40–53)
HGB BLD-MCNC: 15.3 G/DL (ref 13.3–17.7)
HOLD SPECIMEN: NORMAL
IMM GRANULOCYTES # BLD: 0 10E3/UL
IMM GRANULOCYTES NFR BLD: 0 %
INTERPRETATION ECG - MUSE: NORMAL
LACTATE BLD-SCNC: 2.2 MMOL/L
LYMPHOCYTES # BLD AUTO: 2.5 10E3/UL (ref 0.8–5.3)
LYMPHOCYTES NFR BLD AUTO: 25 %
MCH RBC QN AUTO: 29 PG (ref 26.5–33)
MCHC RBC AUTO-ENTMCNC: 31.4 G/DL (ref 31.5–36.5)
MCV RBC AUTO: 93 FL (ref 78–100)
MONOCYTES # BLD AUTO: 0.9 10E3/UL (ref 0–1.3)
MONOCYTES NFR BLD AUTO: 9 %
NEUTROPHILS # BLD AUTO: 6 10E3/UL (ref 1.6–8.3)
NEUTROPHILS NFR BLD AUTO: 61 %
NRBC # BLD AUTO: 0 10E3/UL
NRBC BLD AUTO-RTO: 0 /100
P AXIS - MUSE: 55 DEGREES
PCO2 BLDV: 46 MM HG (ref 40–50)
PH BLDV: 7.45 [PH] (ref 7.32–7.43)
PLATELET # BLD AUTO: 212 10E3/UL (ref 150–450)
PO2 BLDV: 51 MM HG (ref 25–47)
POTASSIUM BLD-SCNC: 4.1 MMOL/L (ref 3.4–5.3)
PR INTERVAL - MUSE: 156 MS
PROCALCITONIN SERPL-MCNC: <0.05 NG/ML
PROT SERPL-MCNC: 8.3 G/DL (ref 6.8–8.8)
QRS DURATION - MUSE: 80 MS
QT - MUSE: 340 MS
QTC - MUSE: 464 MS
R AXIS - MUSE: -61 DEGREES
RBC # BLD AUTO: 5.27 10E6/UL (ref 4.4–5.9)
RSV RNA SPEC NAA+PROBE: NEGATIVE
SAO2 % BLDV: 87 % (ref 94–100)
SARS-COV-2 RNA RESP QL NAA+PROBE: NEGATIVE
SODIUM SERPL-SCNC: 137 MMOL/L (ref 133–144)
SYSTOLIC BLOOD PRESSURE - MUSE: NORMAL MMHG
T AXIS - MUSE: 55 DEGREES
VENTRICULAR RATE- MUSE: 112 BPM
WBC # BLD AUTO: 9.9 10E3/UL (ref 4–11)

## 2023-01-15 PROCEDURE — 74177 CT ABD & PELVIS W/CONTRAST: CPT | Mod: MA

## 2023-01-15 PROCEDURE — 87637 SARSCOV2&INF A&B&RSV AMP PRB: CPT | Performed by: EMERGENCY MEDICINE

## 2023-01-15 PROCEDURE — 84145 PROCALCITONIN (PCT): CPT | Performed by: EMERGENCY MEDICINE

## 2023-01-15 PROCEDURE — 71046 X-RAY EXAM CHEST 2 VIEWS: CPT

## 2023-01-15 PROCEDURE — 250N000011 HC RX IP 250 OP 636: Performed by: EMERGENCY MEDICINE

## 2023-01-15 PROCEDURE — 258N000003 HC RX IP 258 OP 636: Performed by: EMERGENCY MEDICINE

## 2023-01-15 PROCEDURE — 87077 CULTURE AEROBIC IDENTIFY: CPT | Performed by: EMERGENCY MEDICINE

## 2023-01-15 PROCEDURE — C9803 HOPD COVID-19 SPEC COLLECT: HCPCS

## 2023-01-15 PROCEDURE — 82803 BLOOD GASES ANY COMBINATION: CPT

## 2023-01-15 PROCEDURE — 93005 ELECTROCARDIOGRAM TRACING: CPT

## 2023-01-15 PROCEDURE — 82803 BLOOD GASES ANY COMBINATION: CPT | Mod: 91

## 2023-01-15 PROCEDURE — 85025 COMPLETE CBC W/AUTO DIFF WBC: CPT | Performed by: EMERGENCY MEDICINE

## 2023-01-15 PROCEDURE — 96374 THER/PROPH/DIAG INJ IV PUSH: CPT | Mod: 59

## 2023-01-15 PROCEDURE — 80053 COMPREHEN METABOLIC PANEL: CPT | Performed by: EMERGENCY MEDICINE

## 2023-01-15 PROCEDURE — 87149 DNA/RNA DIRECT PROBE: CPT | Performed by: EMERGENCY MEDICINE

## 2023-01-15 PROCEDURE — 36415 COLL VENOUS BLD VENIPUNCTURE: CPT | Performed by: EMERGENCY MEDICINE

## 2023-01-15 PROCEDURE — 96361 HYDRATE IV INFUSION ADD-ON: CPT

## 2023-01-15 PROCEDURE — 250N000009 HC RX 250: Performed by: EMERGENCY MEDICINE

## 2023-01-15 PROCEDURE — 99285 EMERGENCY DEPT VISIT HI MDM: CPT | Mod: 25,CS

## 2023-01-15 RX ORDER — KETOROLAC TROMETHAMINE 15 MG/ML
10 INJECTION, SOLUTION INTRAMUSCULAR; INTRAVENOUS ONCE
Status: COMPLETED | OUTPATIENT
Start: 2023-01-15 | End: 2023-01-15

## 2023-01-15 RX ORDER — IOPAMIDOL 755 MG/ML
71 INJECTION, SOLUTION INTRAVASCULAR ONCE
Status: COMPLETED | OUTPATIENT
Start: 2023-01-15 | End: 2023-01-15

## 2023-01-15 RX ORDER — CARBAMAZEPINE 100 MG/5ML
150 SUSPENSION ORAL ONCE
Status: COMPLETED | OUTPATIENT
Start: 2023-01-16 | End: 2023-01-16

## 2023-01-15 RX ADMIN — KETOROLAC TROMETHAMINE 10 MG: 15 INJECTION, SOLUTION INTRAMUSCULAR; INTRAVENOUS at 20:37

## 2023-01-15 RX ADMIN — SODIUM CHLORIDE 1000 ML: 9 INJECTION, SOLUTION INTRAVENOUS at 19:44

## 2023-01-15 RX ADMIN — SODIUM CHLORIDE 60 ML: 900 INJECTION INTRAVENOUS at 21:12

## 2023-01-15 RX ADMIN — SODIUM CHLORIDE, POTASSIUM CHLORIDE, SODIUM LACTATE AND CALCIUM CHLORIDE 1000 ML: 600; 310; 30; 20 INJECTION, SOLUTION INTRAVENOUS at 22:24

## 2023-01-15 RX ADMIN — IOPAMIDOL 71 ML: 755 INJECTION, SOLUTION INTRAVENOUS at 21:12

## 2023-01-15 ASSESSMENT — ENCOUNTER SYMPTOMS
FEVER: 1
FATIGUE: 1
NAUSEA: 0
VOMITING: 0

## 2023-01-15 ASSESSMENT — ACTIVITIES OF DAILY LIVING (ADL)
ADLS_ACUITY_SCORE: 37

## 2023-01-16 VITALS
HEART RATE: 80 BPM | TEMPERATURE: 97.4 F | SYSTOLIC BLOOD PRESSURE: 111 MMHG | DIASTOLIC BLOOD PRESSURE: 65 MMHG | RESPIRATION RATE: 15 BRPM | OXYGEN SATURATION: 90 %

## 2023-01-16 LAB
ALBUMIN UR-MCNC: 70 MG/DL
APPEARANCE UR: CLEAR
BILIRUB UR QL STRIP: NEGATIVE
COLOR UR AUTO: YELLOW
ENTEROCOCCUS FAECALIS: NOT DETECTED
ENTEROCOCCUS FAECIUM: NOT DETECTED
GLUCOSE UR STRIP-MCNC: NEGATIVE MG/DL
HCO3 BLDV-SCNC: 31 MMOL/L (ref 21–28)
HGB UR QL STRIP: NEGATIVE
KETONES UR STRIP-MCNC: NEGATIVE MG/DL
LACTATE BLD-SCNC: 1.2 MMOL/L
LEUKOCYTE ESTERASE UR QL STRIP: NEGATIVE
LISTERIA SPECIES (DETECTED/NOT DETECTED): NOT DETECTED
MUCOUS THREADS #/AREA URNS LPF: PRESENT /LPF
NITRATE UR QL: NEGATIVE
PCO2 BLDV: 51 MM HG (ref 40–50)
PH BLDV: 7.38 [PH] (ref 7.32–7.43)
PH UR STRIP: 8 [PH] (ref 5–7)
PO2 BLDV: 37 MM HG (ref 25–47)
RBC URINE: 0 /HPF
SAO2 % BLDV: 69 % (ref 94–100)
SP GR UR STRIP: 1.03 (ref 1–1.03)
SQUAMOUS EPITHELIAL: <1 /HPF
STAPHYLOCOCCUS AUREUS: DETECTED
STAPHYLOCOCCUS EPIDERMIDIS: NOT DETECTED
STAPHYLOCOCCUS LUGDUNENSIS: NOT DETECTED
STREPTOCOCCUS AGALACTIAE: NOT DETECTED
STREPTOCOCCUS ANGINOSUS GROUP: NOT DETECTED
STREPTOCOCCUS PNEUMONIAE: NOT DETECTED
STREPTOCOCCUS PYOGENES: NOT DETECTED
STREPTOCOCCUS SPECIES: NOT DETECTED
UROBILINOGEN UR STRIP-MCNC: 4 MG/DL
WBC URINE: 1 /HPF

## 2023-01-16 PROCEDURE — 87086 URINE CULTURE/COLONY COUNT: CPT | Performed by: EMERGENCY MEDICINE

## 2023-01-16 PROCEDURE — 250N000013 HC RX MED GY IP 250 OP 250 PS 637: Performed by: EMERGENCY MEDICINE

## 2023-01-16 PROCEDURE — 81001 URINALYSIS AUTO W/SCOPE: CPT | Performed by: EMERGENCY MEDICINE

## 2023-01-16 PROCEDURE — 96361 HYDRATE IV INFUSION ADD-ON: CPT

## 2023-01-16 RX ADMIN — CARBAMAZEPINE 150 MG: 100 SUSPENSION ORAL at 00:04

## 2023-01-16 ASSESSMENT — ACTIVITIES OF DAILY LIVING (ADL): ADLS_ACUITY_SCORE: 37

## 2023-01-16 NOTE — ED NOTES
Bed: ED07  Expected date:   Expected time:   Means of arrival:   Comments:  Kerri 1 62 M bed bound fever hypotension

## 2023-01-16 NOTE — DISCHARGE INSTRUCTIONS
Prescription strength dosing instructions:  - 650mg acetaminophen (tylenol) every 4-6 hours or 1000mg every 6-8 hours (maximum of 3000mg in 24 hours).  Acetaminophen is often in combination over the counter and prescription pain medications.  Be sure to include this in daily total.    - If tylenol does not sufficiently control the fevers, then you can add ibuprofen.  Try not to use ibuprofen very much due to your history of past GI bleed.  400mg ibuprofen (Advil, Motrin) every 6 hours as needed for fever/pain/inflammation.        Discharge Instructions  Fever    You have been seen today for a fever. Fever is a normal body reaction to illness or inflammation. Fever is a sign that your body is doing what it should to fight something off. Fever is not dangerous, but it can make you feel miserable, and you will probably feel better if you get your fever to go down. Most infections are caused by a virus, and antibiotics will not help; your provider will tell you whether antibiotics are needed in your case. At this time your provider does not find that your fever is a sign of anything dangerous or life-threatening.  However, sometimes the signs of serious illness do not show up right away so additional care may be necessary.    Generally, every Emergency Department visit should have a follow-up clinic visit with either a primary or a specialty clinic/provider. Please follow-up as instructed by your emergency provider today.    What can I do to help myself?  Fill any prescriptions the provider gave you and take them right away--especially antibiotics.  If you have a fever, get plenty of rest and drink lots of fluids, especially water.  What clothes or blankets you have on will not change your fever. Do what is comfortable for you.  Bathing or sponging in lukewarm water may help you feel better.  Tylenol  (acetaminophen), Motrin  (ibuprofen), or Advil  (ibuprofen) help bring fever down and may help you feel more  comfortable. Be sure to read and follow the package directions, and ask your provider if you have questions.  Do not drink alcohol.    Return to the Emergency Department if:  Any of the symptoms you have get much worse.  You seem very sick, like being too weak to get up.  You have any new symptoms, especially serious things like abdominal (belly) pain or chest pain.  You are short of breath.  You have a severe headache.  You are vomiting (throwing up) so much you cannot keep fluids or medicines down.  You have confusion or seem unusually drowsy.  You have a seizure.  You have anything else that worries you.  If you were given a prescription for medicine here today, be sure to read all of the information (including the package insert) that comes with your prescription.  This will include important information about the medicine, its side effects, and any warnings that you need to know about.  The pharmacist who fills the prescription can provide more information and answer questions you may have about the medicine.  If you have questions or concerns that the pharmacist cannot address, please call or return to the Emergency Department.   Remember that you can always come back to the Emergency Department if you are not able to see your regular provider in the amount of time listed above, if you get any new symptoms, or if there is anything that worries you.

## 2023-01-16 NOTE — ED NOTES
Pt's BPs running 80s/40-50s. Pt sleeping. Awakens to vigorous stimulation but goes back to sleep. Dr. Arguelles notified and order for fluid bolus.

## 2023-01-16 NOTE — ED NOTES
Talked with pt's mom Savannah and she reports she gave tylenol about 3 hours ago but has not had ibuprofen.

## 2023-01-16 NOTE — ED TRIAGE NOTES
Pt with fever of 100.7 at home. AM was okay then slept a lot this afternoon. Initial BPs 90/60s.

## 2023-01-16 NOTE — ED NOTES
Mom sent pt's prescription bottle for Briviact 10mg/ml with rx of give 10mL by mouth twice a day and asked that we give pt's 2100 dose. Dr. Arguelles notified and okay to give pt his home medication. Medication administered through feeding tube.

## 2023-01-16 NOTE — ED NOTES
[facility]  ED Nurse Handoff Report    ED Chief complaint: Fever (Pt with temp of 100.7 at home. Feeling well earlier today and then slept a lot this afternoon.  Cough.)      ED Diagnosis:   Final diagnoses:   None       Code Status: Full Code    Allergies:   Allergies   Allergen Reactions    Valproic Acid Other (See Comments)     Toxicity w/ bone marrow suspension, elevated ammonia levels     Dilantin [Phenytoin Sodium] Other (See Comments)     Severe Trembling    Scopolamine Hives     Hives with the patch - oral no problem       Patient Story: Pt presents from home with fevers of 100.7. Had been doing well this AM but slept a lot this afternoon.   Focused Assessment:  Pt presents with frequent cough with thick yellow productive sputum that pt needs assistance suctioning. Pt is a total care pt. Pt nonverbal mostly but able to nod and shake his head in response to questions. BPs for EMS were 90/60s. Here BPs range from 80-110s/60-70s. Initial lactic 2.2. Received 1L NS and repeat 1.2.     Treatments and/or interventions provided: see above  Patient's response to treatments and/or interventions: see above    To be done/followed up on inpatient unit:  none pending    Does this patient have any cognitive concerns?:  total care; does not try to get out of bed    Activity level - Baseline/Home:  Total Care  Activity Level - Current:   Total Care    Patient's Preferred language: English   Needed?: No    Isolation: None  Infection: Not Applicable  MRSA  Patient tested for COVID 19 prior to admission: YES  Bariatric?: No    Vital Signs:   Vitals:    01/15/23 2050 01/15/23 2115 01/15/23 2130 01/15/23 2145   BP: 93/66 113/63 118/89 103/54   Pulse: 92 90 84 86   Resp: 12 18 13 14   Temp:       TempSrc:       SpO2: 97% 98% 98% 99%       Cardiac Rhythm:     Was the PSS-3 completed:   No -pt did not answer questions  What interventions are required if any?               Family Comments: Deangelo Nuñez  contacted.  OBS brochure/video discussed/provided to patient/family: N/A              Name of person given brochure if not patient: N/A              Relationship to patient: N/A    For the majority of the shift this patient's behavior was Green.   Behavioral interventions performed were information.    ED NURSE PHONE NUMBER: [phone]

## 2023-01-16 NOTE — ED PROVIDER NOTES
History     Chief Complaint:  Fever    The history is provided by the EMS personnel and a parent (mother).      Keyon Farias is a 62 year old male who presents with a fever. The patient arrives to the ED via EMS after his mother called due to a fever of 102.7. Per EMS, the patient's mother said that Keyon was feeling fine this morning, but was sleeping a lot throughout the day. Then this evening when she went to check on him, he had a fever of 102.7. Mother gave the patient 150 mL fluids through his feeding tube along with two tablets of tylenol, each 500 mg around 1730. She also gave scopolamine around 1800 for phlegm in his throat, and 15 mg hydrocortisone PO as she has been instructed to give 5mg for each degree of fever. On EMS arrival, blood pressure was 93/68 with a blood sugar of 260. No vomiting, diarrhea, or skin changes.     Independent Historian: EMS and the patient's mother     Review of External Notes: past ED visits including surgery consultation for free air last November which was his most recent admission, had aspiration pneumonia    ROS:  Review of Systems   Constitutional: Positive for fatigue and fever.   Gastrointestinal: Negative for nausea and vomiting.   Skin: Negative.    All other systems reviewed and are negative.    Allergies:  Valproic Acid  Dilantin  Scopolamine     Medications:    Mucomyst  Proventil  Optivar  Briviact  Calcium carbonate  Tegretol  Robinul  Mucinex  Cortaid  Cortef  Synthroid  Reglan  Protonix  Testosterone cypionate    Past Medical History:    Ventricular tachycardia  Ventricular fibrillation  UTI  TBI  Tracheostomy care  Thyroid disease  Spastic hemiplegia affecting dominant side  Septic shock  Sepsis  Seizures  Panhypopituitarism  GERD  DVT  Aphasia due to TBI    Past Surgical History:    Endoscopic ultrasound upper gastrointestinal tract   EGD, necronectomy  EGD, combined x3  Reconstructive facial surgery   IR gastro jejunostomy tube change x19  PICC exchange  left  Laparoscopic appendectomy   Laparoscopic assisted insertion tube gastrotomy   Orthopedic surgery, right hand repair  Tracheostomy  Vascular surgery      Family History:    family history includes Diabetes Type 2  in his maternal grandmother; Hypertension in his father; Kidney Cancer in his father; Pulmonary Embolism in his mother.    Social History:   reports that he quit smoking about 33 years ago. He has never used smokeless tobacco. He reports that he does not drink alcohol and does not use drugs.  PCP: Elsa Queen   Lives with mother, who is the legal guardian.     Physical Exam     Patient Vitals for the past 24 hrs:   BP Temp Temp src Pulse Resp SpO2   01/16/23 0030 119/59 -- -- 78 27 97 %   01/16/23 0010 105/88 -- -- 73 19 99 %   01/15/23 2335 -- 97.4  F (36.3  C) Temporal -- -- --   01/15/23 2317 90/55 -- -- 74 19 97 %   01/15/23 2300 99/61 -- -- 76 23 95 %   01/15/23 2250 102/51 -- -- 88 21 92 %   01/15/23 2240 98/49 -- -- 82 18 95 %   01/15/23 2220 (!) 86/49 -- -- 81 18 98 %   01/15/23 2200 -- -- -- -- 16 --   01/15/23 2145 103/54 -- -- 86 14 99 %   01/15/23 2130 118/89 -- -- 84 13 98 %   01/15/23 2115 113/63 -- -- 90 18 98 %   01/15/23 2050 93/66 -- -- 92 12 97 %   01/15/23 2040 (!) 85/64 -- -- 88 19 96 %   01/15/23 2030 94/72 -- -- 93 11 97 %   01/15/23 2020 106/71 -- -- 91 18 98 %   01/15/23 2000 108/80 -- -- 89 16 98 %   01/15/23 1950 97/62 -- -- 95 21 99 %   01/15/23 1940 102/70 -- -- 101 -- --   01/15/23 1920 99/70 -- -- 98 -- 98 %   01/15/23 1901 98/76 -- -- 115 22 96 %   01/15/23 1900 -- (!) 101.5  F (38.6  C) Rectal -- -- --   01/15/23 1854 117/82 -- -- 111 -- (!) 85 %        Physical Exam  Eyes:  Sclera white; Pupils are equal and round  ENT:    External ears and nares normal  CV:  Rate as above with regular rhythm   Resp:  Breath sounds clear and equal bilaterally, coughing during exam    Non-labored, no retractions or accessory muscle use  GI:  Abdomen is soft, non-tender,  non-distended    No rebound tenderness or peritoneal features  MS:  Baseline deficits  Skin:  Warm and dry, skin on abdomen and around feeding tube normal, extremity skin normal,  skin normal, sacral erythema with no skin break down  Neuro:  Eyes open, moaning to IV placement attempts          Emergency Department Course     ECG  ECG taken at 1900, ECG read at 1914  Sinus tachycardia. Left anterior fascicular block.    Rate 112 bpm. PA interval 156 ms. QRS duration 80 ms. QT/QTc 340/464 ms. P-R-T axes 55 -61 55.     Imaging:  CT Chest/Abdomen/Pelvis w Contrast   Final Result   IMPRESSION:   1.  Small volume pneumoperitoneum in the right upper quadrant of the abdomen is again seen. The overall amount of free air has slightly diminished compared with 01/10/2023, although has not resolved. The etiology remains uncertain, with specific site of    perforation again not identified. There is no evidence of free fluid or defined abscess.   2.  Percutaneous gastrojejunal feeding tube remains in place.   3.  Infiltrates in the lungs, especially the lower lobe right lung are again seen. Overall these do not appear to have clearly changed compared with the CT abdomen and pelvis of 01/10/2023 and are less pronounced than on the CT chest of 06/18/2022.   4.  Bronchial wall thickening with apparent mucous plugging, most pronounced involving right lower lobe bronchi. These findings are also less pronounced today than on 06/18/2022.   5.  Large amount of stool in the rectum, similar to 01/10/2023, and moderate amount through the sigmoid colon. Findings suspicious for obstipation.   6.  Colonic diverticulosis without evidence for acute diverticulitis.   7.  Cystic lesion along the tail of the pancreas is again seen. This has been seen on multiple previous studies. There is no clear evidence for mural nodularity or enhancement, but this could be more fully evaluated with endoscopic ultrasound. Management    guidelines are outlined  below.   8.  See above for additional exam details.      REFERENCE:   Revisions of international consensus [place] guidelines for the management of IPMN of the pancreas. Pancreatology 2017;17(5):738-753.      Largest cyst between 20-30 mm: EUS in 3-6 months and then annual surveillance alternating between MRI and EUS as appropriate.         Chest XR,  PA & LAT   Final Result   IMPRESSION: Air under the right hemidiaphragm, likely reflecting colonic air based on position of the colon on CT dated 5/27/2022, however, if there is concern for pneumoperitoneum, CT may be obtained. Low lung volumes with basilar atelectasis. No focal    consolidation. No pneumothorax. Cardiac silhouette within normal limits.         Report per radiology    Laboratory:  Labs Ordered and Resulted from Time of ED Arrival to Time of ED Departure   COMPREHENSIVE METABOLIC PANEL - Abnormal       Result Value    Sodium 137      Potassium 4.1      Chloride 100      Carbon Dioxide (CO2) 31      Anion Gap 6      Urea Nitrogen 20      Creatinine 1.05      Calcium 8.8      Glucose 184 (*)     Alkaline Phosphatase 93      AST 26      ALT 27      Protein Total 8.3      Albumin 3.4      Bilirubin Total 0.8      GFR Estimate 81     ROUTINE UA WITH MICROSCOPIC - Abnormal    Color Urine Yellow      Appearance Urine Clear      Glucose Urine Negative      Bilirubin Urine Negative      Ketones Urine Negative      Specific Gravity Urine 1.029      Blood Urine Negative      pH Urine 8.0 (*)     Protein Albumin Urine 70 (*)     Urobilinogen Urine 4.0 (*)     Nitrite Urine Negative      Leukocyte Esterase Urine Negative      Mucus Urine Present (*)     RBC Urine 0      WBC Urine 1      Squamous Epithelials Urine <1     CBC WITH PLATELETS AND DIFFERENTIAL - Abnormal    WBC Count 9.9      RBC Count 5.27      Hemoglobin 15.3      Hematocrit 48.8      MCV 93      MCH 29.0      MCHC 31.4 (*)     RDW 14.1      Platelet Count 212      % Neutrophils 61      % Lymphocytes  25      % Monocytes 9      % Eosinophils 4      % Basophils 1      % Immature Granulocytes 0      NRBCs per 100 WBC 0      Absolute Neutrophils 6.0      Absolute Lymphocytes 2.5      Absolute Monocytes 0.9      Absolute Eosinophils 0.4      Absolute Basophils 0.1      Absolute Immature Granulocytes 0.0      Absolute NRBCs 0.0     ISTAT GASES LACTATE VENOUS POCT - Abnormal    Lactic Acid POCT 2.2 (*)     Bicarbonate Venous POCT 32 (*)     O2 Sat, Venous POCT 87 (*)     pCO2V Venous POCT 46      pH Venous POCT 7.45 (*)     pO2 Venous POCT 51 (*)    INFLUENZA A/B & SARS-COV2 PCR MULTIPLEX - Normal    Influenza A PCR Negative      Influenza B PCR Negative      RSV PCR Negative      SARS CoV2 PCR Negative     PROCALCITONIN - Normal    Procalcitonin <0.05     URINE CULTURE   BLOOD CULTURE   BLOOD CULTURE        Procedures   NA    Emergency Department Course & Assessments:         Interventions:  Medications   sodium chloride (PF) 0.9% PF flush 3 mL (has no administration in time range)   sodium chloride (PF) 0.9% PF flush 3 mL (3 mLs Intracatheter Given 1/15/23 2336)   0.9% sodium chloride BOLUS (0 mLs Intravenous Stopped 1/15/23 2123)   ketorolac (TORADOL) injection 10 mg (10 mg Intravenous Given 1/15/23 2037)   iopamidol (ISOVUE-370) solution 71 mL (71 mLs Intravenous Given 1/15/23 2112)   Saline (60 mLs As instructed Given 1/15/23 2112)   lactated ringers BOLUS 1,000 mL (1,000 mLs Intravenous New Bag 1/15/23 2224)   carBAMazepine (TEGretol) suspension 150 mg (150 mg Oral or Feeding Tube Given 1/16/23 0004)     Consultations/Discussion of Management or Tests:  1850 I obtained history and examined the patient as noted above.   1900 I spoke with the patient's mother on the phone, who provided additional history as noted in the HPI.   0045 I rechecked the patient and explained findings.        Disposition:  The patient was signed out to the oncoming physician pending arrival of transport home.     Impression & Plan       Medical Decision Making:  He is at risk for multiple sources of infection especially recurrent aspiration pneumonia.  Rapid viral antigen testing for influenza, COVID, and RSV was negative.  Chest x-ray was obtained with no obvious effusion and no overt pneumonia.  Findings were discussed with radiology due to the persistence of air beneath the diaphragm that could be free air or intestinal.  CT scan was recommended.  Initial lactic acid was mildly elevated.  He was given IV fluids while pursuing further investigation for bacterial diseases.  Repeat lactic normalized.  CT showed several chronic findings.  None are felt to represent acute infection.  Procalcitonin was undetectable highly suggestive that there is no associated bacterial infection.  I have seen him previously with associated free air on CT last November 2022.  At that visit and today's visit there is no abdominal tenderness or firmness.  There are no findings of peritonitis.  Air is felt to be secondary to coughing in the presence of his feeding tube.  He had some initial hypoxia that is felt to be secondary to mucous plugging and has not completely resolved.  He was monitored for some time while he was sleeping to ensure that there was no evidence for recurrence which there was not.  Urinalysis showed no infection.  I updated his mother who is comfortable with him being transported back home.  She understands the cultures are in process and if there is an abnormality then they they will be contacted.  At this time fever felt most likely to be of viral etiology.  Return immediately if showing other signs of worsening.      Diagnosis:    ICD-10-CM    1. Fever in adult  R50.9       2. Cough in adult  R05.9       3. Mucus plug in respiratory tract  T17.998A            Discharge Medications:  Discharge Medication List as of 1/16/2023  1:29 AM           Scribe Disclosure:  Jake DYKES, am serving as a scribe at 6:51 PM on 1/15/2023 to document  services personally performed by Jennie Arguelles MD based on my observations and the provider's statements to me.     1/15/2023   Jennie Arguelles MD Gosen, Christine Leigh, MD  01/16/23 0147

## 2023-01-16 NOTE — ED NOTES
Pt coughing up thick yellow secretions. Suctioned and oral cares given. Pt does not like oral cares done.

## 2023-01-17 ENCOUNTER — MEDICAL CORRESPONDENCE (OUTPATIENT)
Dept: HEALTH INFORMATION MANAGEMENT | Facility: CLINIC | Age: 63
End: 2023-01-17

## 2023-01-17 LAB — BACTERIA UR CULT: NO GROWTH

## 2023-01-17 NOTE — ED PROVIDER NOTES
Notified during my shift that one of two cultures came back positive for staph aureus.  Urine culture with no growth.  Patient had no elevated inflammatory markers and a normal pro-calcitonin.  Skin contaminant is on the differential.  I called to discuss with his mother overnight.  Voicemail left.  If he is improving then contaminant most likely, if remains sick then repeat evaluation appropriate.         Jennie Arguelles MD  01/16/23 5344

## 2023-01-18 LAB
BACTERIA BLD CULT: ABNORMAL
BACTERIA BLD CULT: ABNORMAL

## 2023-01-19 ENCOUNTER — TELEPHONE (OUTPATIENT)
Dept: PHARMACY | Facility: CLINIC | Age: 63
End: 2023-01-19
Payer: MEDICARE

## 2023-01-19 NOTE — TELEPHONE ENCOUNTER
We have been unable to reach this patient for MTM follow-up after several attempts. We will stop reaching out to the patient at this time. Please let us know if we can assist in this patient's care in the future.    Routing to PCP as Justin WillinghamD, [facility]  Medication Therapy Management Provider  Pager: [phone]

## 2023-01-20 ENCOUNTER — TELEPHONE (OUTPATIENT)
Dept: EMERGENCY MEDICINE | Facility: CLINIC | Age: 63
End: 2023-01-20
Payer: MEDICARE

## 2023-01-20 ENCOUNTER — TELEPHONE (OUTPATIENT)
Dept: FAMILY MEDICINE | Facility: CLINIC | Age: 63
End: 2023-01-20
Payer: MEDICARE

## 2023-01-20 LAB — BACTERIA BLD CULT: NO GROWTH

## 2023-01-20 NOTE — TELEPHONE ENCOUNTER
Called pharmacy regarding MD message below. They will get order ready for patient to .     Pharmacy is looking for ok to give 25 vials.  And states 4 ml is recommended dosage.    Please ok above.       Kamryn Murcia RN on 1/20/2023 at 11:21 AM

## 2023-01-20 NOTE — TELEPHONE ENCOUNTER
[facility] Emergency Department Lab result notification    [place] ED lab result protocol used  Blood culture    Reason for call  Notify of lab results, assess symptoms,  review ED providers recommendations/discharge instructions (if necessary) and advise per ED lab result f/u protocol    Lab Result (including Rx patient on, if applicable)  Final BLOOD culture on 1/18/23 shows the presence of bacteria(s): Staphylococcus aureus MRSA, [facility] Emergency Dept discharge antibiotic prescribed: None  Recommendations in Treatment per [facility] ED lab result Blood culture protocol    Information table from Emergency Dept Provider visit on 1/15/23  Symptoms reported at ED visit (Chief complaint, HPI) Fever     The history is provided by the EMS personnel and a parent (mother).      Keyon Farias is a 62 year old male who presents with a fever. The patient arrives to the ED via EMS after his mother called due to a fever of 102.7. Per EMS, the patient's mother said that Keyon was feeling fine this morning, but was sleeping a lot throughout the day. Then this evening when she went to check on him, he had a fever of 102.7. Mother gave the patient 150 mL fluids through his feeding tube along with two tablets of tylenol, each 500 mg around 1730. She also gave scopolamine around 1800 for phlegm in his throat, and 15 mg hydrocortisone PO as she has been instructed to give 5mg for each degree of fever. On EMS arrival, blood pressure was 93/68 with a blood sugar of 260. No vomiting, diarrhea, or skin changes.    Significant Medical hx, if applicable (i.e. CKD, diabetes) Reviewed   Allergies Allergies   Allergen Reactions     Valproic Acid Other (See Comments)     Toxicity w/ bone marrow suspension, elevated ammonia levels      Dilantin [Phenytoin Sodium] Other (See Comments)     Severe Trembling     Scopolamine Hives     Hives with the patch - oral no problem      Weight, if applicable Wt Readings from Last 2  Encounters:   01/09/23 63.5 kg (140 lb)   11/28/22 64.4 kg (141 lb 14.4 oz)      Coumadin/Warfarin [Yes /No] No   Creatinine Level (mg/dl) Creatinine   Date Value Ref Range Status   01/15/2023 1.05 0.66 - 1.25 mg/dL Final   05/24/2021 0.80 0.66 - 1.25 mg/dL Final      Creatinine clearance (ml/min), if applicable Serum creatinine: 1.05 mg/dL 01/15/23 1857  Estimated creatinine clearance: 67.2 mL/min   ED providers Impression and Plan (applicable information) He is at risk for multiple sources of infection especially recurrent aspiration pneumonia.  Rapid viral antigen testing for influenza, COVID, and RSV was negative.  Chest x-ray was obtained with no obvious effusion and no overt pneumonia.  Findings were discussed with radiology due to the persistence of air beneath the diaphragm that could be free air or intestinal.  CT scan was recommended.  Initial lactic acid was mildly elevated.  He was given IV fluids while pursuing further investigation for bacterial diseases.  Repeat lactic normalized.  CT showed several chronic findings.  None are felt to represent acute infection.  Procalcitonin was undetectable highly suggestive that there is no associated bacterial infection.  I have seen him previously with associated free air on CT last November 2022.  At that visit and today's visit there is no abdominal tenderness or firmness.  There are no findings of peritonitis.  Air is felt to be secondary to coughing in the presence of his feeding tube.  He had some initial hypoxia that is felt to be secondary to mucous plugging and has not completely resolved.  He was monitored for some time while he was sleeping to ensure that there was no evidence for recurrence which there was not.  Urinalysis showed no infection.  I updated his mother who is comfortable with him being transported back home.  She understands the cultures are in process and if there is an abnormality then they they will be contacted.  At this time fever  felt most likely to be of viral etiology.  Return immediately if showing other signs of worsening.     ED diagnosis  Fever in adult     Cough in adult     Mucus plug in respiratory tract     ED provider Jennie Arguelles MD       RN Assessment (Patient s current Symptoms), include time called.  [Insert Left message here if message left]  2:47p  Left voicemail message requesting a call back to [facility] ED Lab Result RN at [phone].  RN is available every day between 9 a.m. and 5:30 p.m.  See Telephone encounter.    Yenny Shaffer RN  [facility] [employer] [place]  Emergency Dept Lab Result RN  Ph# [phone]     Copy of Lab result    Blood Culture Peripheral Blood  Order: [id number]   Status: Final result      Visible to patient: No (inaccessible in MyChart)     Specimen Information: Peripheral Blood    3 Result Notes  Culture Positive on the 1st day of incubation Abnormal        Staphylococcus aureus MRSA Panic     1 of 2 bottles        Resulting Agency: [facility]     Susceptibility    Staphylococcus aureus MRSA (1)    Antibiotic Interpretation Sensitivity  Method Status    Oxacillin Resistant >=4 ug/mL TORY Final     Oxacillin susceptible isolates are susceptible to cephalosporins (example: cefazolin and cephalexin) and beta lactam combination agents. Oxacillin resistant isolates are resistant to these agents.       Gentamicin Susceptible <=0.5 ug/mL TORY Final    Erythromycin Resistant >=8 ug/mL TORY Final    Clindamycin Resistant   TORY Final     This isolate is presumed to be clindamycin resistant based on detection of inducible clindamycin resistance. Erythromycin and clindamycin are resistant; therefore, they are not recommended for use.       Linezolid Susceptible 2 ug/mL TORY Final    Vancomycin Susceptible <=0.5 ug/mL TORY Final    Tetracycline Susceptible <=1 ug/mL TORY Final    Trimethoprim/Sulfamethoxazole Susceptible <=0.5/9.5 ug/mL TORY Final    Susceptibility  Comments    MRSA requires contact precautions.         Specimen Collected: 01/15/23  6:57 PM Last Resulted: 01/18/23  7:29 AM

## 2023-01-20 NOTE — TELEPHONE ENCOUNTER
Outpatient Medication Detail     Disp Refills Start End MAX   acetylcysteine (MUCOMYST) 20 % neb solution 10 mL 3 12/1/2022  No   Sig - Route: Take 2 mLs by nebulization 4 times daily With albuterol at 0700, 1100, 1500, and 1900 - Nebulization   Sent to pharmacy as: Acetylcysteine 20 % Inhalation Solution (MUCOMYST)   Class: E-Prescribe   Order: [id number]   E-Prescribing Status: Receipt confirmed by pharmacy (12/1/2022 12:11 PM CST)     Medication Administration Instructions    With albuterol at 0700, 1100, 1500, and 1900     Pharmacy    [id number] - [place], [place] - [address] AT [facility] [address]     Associated Diagnoses    Aspiration pneumonitis (H) [J69.0]         Pharmacy faxing - requesting verification of quantity - is 10mL correct?  Medication only comes in 4mL vials, so can only fill 2 vials as written.    Please contact pharmacy to verify if quantity is correct.    [facility] [address]  Phone [phone]    RT Candace (R)

## 2023-01-20 NOTE — TELEPHONE ENCOUNTER
Called pharmacy with MD domingo below. Pharmacist will get the rx ready for patient.     Kamryn Murcia RN on 1/20/2023 at 1:10 PM

## 2023-01-21 ENCOUNTER — TELEPHONE (OUTPATIENT)
Dept: NURSING | Facility: CLINIC | Age: 63
End: 2023-01-21
Payer: MEDICARE

## 2023-01-21 ENCOUNTER — TELEPHONE (OUTPATIENT)
Dept: EMERGENCY MEDICINE | Facility: CLINIC | Age: 63
End: 2023-01-21
Payer: MEDICARE

## 2023-01-21 NOTE — TELEPHONE ENCOUNTER
Pt's mother calling to make an appt for patient per the ED recommendation. Pt gave writer consent to communicate today.   Writer transferred caller to the appt line to make a follow up appt.

## 2023-01-21 NOTE — TELEPHONE ENCOUNTER
Erroneous encounter, disregard.    Autumn Valadez RN  [facility] RN  Lung Nodule and ED Lab Result RN  Epic pool (ED late result f/u RN): [id number]  [facility] INCIDENTAL RADIOLOGY F/U NURSES: [id number]  # [phone]

## 2023-01-21 NOTE — TELEPHONE ENCOUNTER
"[facility] () Emergency Department Lab result notification    [place] ED lab result protocol used  Blood culture    Reason for call  Notify of lab results, assess symptoms,  review ED providers recommendations/discharge instructions (if necessary) and advise per ED lab result f/u protocol    Lab Result (including Rx patient on, if applicable)  Final BLOOD culture on 1/18/23 shows the presence of bacteria(s): Staphylococcus aureus MRSA  [facility] Emergency Dept discharge antibiotic prescribed: NONE  Recommendations in Treatment per [facility] ED lab result Blood culture protocol    RN Assessment (Patient s current Symptoms), include time called.    2:12 PM - spoke with guardian niharika - mother - \"he has been complaining today something is making him uncomfortable I don't know what it is\" - overall he has improved \"he is doing better\" since ED visit  Fever: 97.3 F, axillary  Breathing: \"got a bunch of garbage in his throat but he's breathing ok - gets phlegm in throat\" - she is going to give scopolamine because there seems to be a lot of it - doesn't appear short of breathe, no wheezing  Cognition/activity:  Very alert, staying awake during the day - addition of fluids have helped this guardian feels  Urinary output/Catheter:  No catheter - yes having urinary output - dehydration concerns  Rash:  No  Pain: None  Hydration: reports patient was having low blood pressure 88/\"over something\" -  has increased fluids and patient improved - recheck was 134/something - \"improved right away\"  Infection concerns: no concerns with G-tube, some pink color around area, no other skin wounds/abscess  BP:  111/72 1/21/2023 2:31 PM    [place] Emergency Department Provider Name & Recommendations  S-(situation): 62 yr M presented to the ED 1/15 with fever, cough, mucous plug final blood culture 1/2 bottles growing MRSA (hx of MRSA), urine culture negative, no antibiotic treatment - seeking interpretation and " recommendations  B-(background): TBI, aphasia, G-tube feedings, hemiplegia, hx sepsis  A-(assessment): overall improvement since ED - see above  R-(recommendations): ED consult     Consultation Time:  1/21/2023 2:44 PM  Consulted with [facility] () Emergency Department Provider, MD Mirna.   Emergency Dept Provider Recommendations:  Recheck in clinic on Monday return to ED for any worsening for sepsis - probable contaminant, no antibiotic treatment advised.      RN Recommendations/Instructions per [facility] ED lab result protocol  Patient guardian Savannah notified of lab result and treatment recommendations - ED consult provider recommendations reviewed and transferred to scheduling, return for worsening especially cognition changes, dehydration, fevers, breathing problems, infection concerns, worsening - guardian verbalized understanding      Please Contact your PCP clinic or return to the Emergency department if your:    Symptoms return.    Symptoms worsen or other concerning symptom's.    PCP follow-up Questions asked: YES       Vasile Vitale RN  [facility] [employer]* [place]  Emergency Dept Lab Result RN  # [phone]     Copy of Lab result   Blood Culture Peripheral Blood  Order: [id number]   Collected 1/15/2023  6:57 PM      Status: Final result      Visible to patient: No (inaccessible in MyChart)     Specimen Information: Peripheral Blood    3 Result Notes  Culture Positive on the 1st day of incubation Abnormal        Staphylococcus aureus MRSA Panic     1 of 2 bottles        Resulting Agency: [facility]     Susceptibility     Staphylococcus aureus MRSA     [facility]     Clindamycin  Resistant 1     Erythromycin >=8 ug/mL Resistant     Gentamicin <=0.5 ug/mL Susceptible     Linezolid 2 ug/mL Susceptible     Oxacillin >=4 ug/mL Resistant 2     Tetracycline <=1 ug/mL Susceptible     Trimethoprim/Sulfamethoxazole <=0.5/9.5 u... Susceptible     Vancomycin <=0.5 ug/mL Susceptible               1 This isolate is presumed to be clindamycin resistant based on detection of inducible clindamycin resistance. Erythromycin and clindamycin are resistant; therefore, they are not recommended for use.   2 Oxacillin susceptible isolates are susceptible to cephalosporins (example: cefazolin and cephalexin) and beta lactam combination agents. Oxacillin resistant isolates are resistant to these agents.        Susceptibility Comments    Staphylococcus aureus MRSA   MRSA requires contact precautions.         Specimen Collected: 01/15/23  6:57 PM Last Resulted: 01/18/23  7:29 AM

## 2023-01-21 NOTE — ED PROVIDER NOTES
I was called by the culture result follow up team in regards to 1/2 blood culture with staph aureus. This was addressed as well by Dr Arguelles on 1/16/2023 The patient is cared by his mother who was contacted by the follow up team. She reports that he seems much better. No fever. No pain. Mental status improved since the prior call. Most likely contamination. However, she agrees to follow up in the primary clinic on Monday (2 days) and will otherwise return him here if worse.     Ruben Bradley MD  01/21/23 [id number]

## 2023-01-23 ENCOUNTER — TELEPHONE (OUTPATIENT)
Dept: FAMILY MEDICINE | Facility: CLINIC | Age: 63
End: 2023-01-23

## 2023-01-23 ENCOUNTER — OFFICE VISIT (OUTPATIENT)
Dept: FAMILY MEDICINE | Facility: CLINIC | Age: 63
End: 2023-01-23
Payer: MEDICARE

## 2023-01-23 VITALS
HEART RATE: 65 BPM | DIASTOLIC BLOOD PRESSURE: 63 MMHG | BODY MASS INDEX: 18.96 KG/M2 | OXYGEN SATURATION: 98 % | TEMPERATURE: 97.2 F | HEIGHT: 72 IN | SYSTOLIC BLOOD PRESSURE: 95 MMHG | WEIGHT: 140 LBS

## 2023-01-23 DIAGNOSIS — Z09 HOSPITAL DISCHARGE FOLLOW-UP: Primary | ICD-10-CM

## 2023-01-23 DIAGNOSIS — R50.9 FEVER IN ADULT: ICD-10-CM

## 2023-01-23 PROCEDURE — 99213 OFFICE O/P EST LOW 20 MIN: CPT | Performed by: NURSE PRACTITIONER

## 2023-01-23 ASSESSMENT — PAIN SCALES - GENERAL: PAINLEVEL: NO PAIN (0)

## 2023-01-23 NOTE — PROGRESS NOTES
Assessment & Plan     (Z09) Hospital discharge follow-up  (primary encounter diagnosis)  Comment: doing great since being home. No concerns today. Had positive blood culture but suspect this was contaminant as he currently has no symptoms. We will continue to monitor. Follow-up prn   Plan:     (R50.9) Fever in adult  Comment:   Plan:        MED REC REQUIRED  Post Medication Reconciliation Status:  Discharge medications reconciled, continue medications without change        No follow-ups on file.    NATHANIEL Ruff CNP  [facility] PERNELL Ta is a 62 year old accompanied by his mother, presenting for the following health issues:  Hospital F/U      [facility]       Hospital Follow-up Visit:    Hospital/Nursing Home/IP Rehab Facility: [facility]  Date of Admission: 1/15/23  Date of Discharge: 1/16/23  Reason(s) for Admission: Fever in adult, cough in adult, mucus plug in respiratory tract    Was your hospitalization related to COVID-19? No   Problems taking medications regularly:  None  Medication changes since discharge: None  Problems adhering to non-medication therapy:  None    Summary of hospitalization:  [facility] discharge summary reviewed  Diagnostic Tests/Treatments reviewed.  Follow up needed: none  Other Healthcare Providers Involved in Patient s Care:         None  Update since discharge: improved.   Plan of care communicated with patient and family     History gathered from pt's mother as pt is nonverbal   He has been doing really well at home.   Blood culture was positive but he is not currently having any symptoms   They have no concerns today       Review of Systems   Detailed as above         Objective    BP 95/63 (BP Location: Left arm, Patient Position: Sitting, Cuff Size: Adult Regular)   Pulse 65   Temp 97.2  F (36.2  C) (Temporal)   Ht 1.829 m (6')   Wt 63.5 kg (140 lb)   SpO2 98%   BMI 18.99 kg/m    Body mass index  is 18.99 kg/m .  Physical Exam  Constitutional:       Comments: Using wheelchair    Pulmonary:      Effort: Pulmonary effort is normal.   Neurological:      Mental Status: He is alert.   Psychiatric:      Comments: Nonverbal. Pleasant. Normal receptive communication

## 2023-01-23 NOTE — TELEPHONE ENCOUNTER
Mother states that the patient just went to bed. States that it is too difficult to wake the patient up and get him out the door.   She states that the patient is sleeping and too difficult to get up and out the door.   Mother states that a hospital doctor called her yesterday stating that the patient needs to be seen. Appointment rescheduled to an afternoon at [place] with Jeanne Shabazz RN

## 2023-01-26 ENCOUNTER — TELEPHONE (OUTPATIENT)
Dept: FAMILY MEDICINE | Facility: CLINIC | Age: 63
End: 2023-01-26
Payer: MEDICARE

## 2023-01-26 NOTE — TELEPHONE ENCOUNTER
Levi RN from [facility] calling for approval for orders for skilled nursinx per week for one week for re-certification  Then every other week for six weeks  With 3 visits PRN    Callback: [phone] confidential vm    Orin Bradley, RN  [facility]

## 2023-01-27 NOTE — TELEPHONE ENCOUNTER
Called NANCI Sims back regarding MD message below. Left detailed message regarding PCP message below. Advised to call back with any further questions or concerns.     Kamryn Murcia RN on 1/27/2023 at 12:06 PM

## 2023-02-03 DIAGNOSIS — E23.0 PANHYPOPITUITARISM (H): ICD-10-CM

## 2023-02-03 DIAGNOSIS — E29.1 HYPOGONADISM MALE: ICD-10-CM

## 2023-02-03 RX ORDER — TESTOSTERONE CYPIONATE 200 MG/ML
INJECTION, SOLUTION INTRAMUSCULAR
Qty: 4 ML | Refills: 3 | Status: SHIPPED | OUTPATIENT
Start: 2023-02-03 | End: 2023-05-15

## 2023-02-08 ENCOUNTER — VIRTUAL VISIT (OUTPATIENT)
Dept: ENDOCRINOLOGY | Facility: CLINIC | Age: 63
End: 2023-02-08
Payer: MEDICARE

## 2023-02-08 DIAGNOSIS — E27.49 SECONDARY ADRENAL INSUFFICIENCY (H): ICD-10-CM

## 2023-02-08 DIAGNOSIS — E23.0 PANHYPOPITUITARISM (H): Primary | ICD-10-CM

## 2023-02-08 DIAGNOSIS — E03.8 SECONDARY HYPOTHYROIDISM: ICD-10-CM

## 2023-02-08 DIAGNOSIS — E29.1 HYPOGONADISM MALE: ICD-10-CM

## 2023-02-08 PROCEDURE — 99215 OFFICE O/P EST HI 40 MIN: CPT | Mod: VID | Performed by: INTERNAL MEDICINE

## 2023-02-08 NOTE — PROGRESS NOTES
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   Over the last 12 months has not been on DDAVP. Recently checked Na was stable.     Secondary adrenal insufficiency   increase Hydrocortisone (HC) 15 mg in the AM, 7.5mg at 3:00 PM  (noted drop in his weight; Na stable)   The lowest effective dose of hydrocortisone is what is recommended to be used.  - sick day rules - increase hydrocortisone to 2-3X of the maintenance dose during sickness like flu. Needs injection if unable to keep medication down due to vomiting.   Injectable form of hydrocortisone sent to the pharmacy to be used as needed.      Central hypothyroidism   -continue Levothyroxine 150 mcg daily.  Last free T4 within the recommended range. Follow up lab ordered.      Central hypogonadism   -Last testosterone level is slightly higher than the recommended range therefore I have advised to reduce the dose of testosterone to 60 mg every 7 days from previous dose of 76 mg every 7 days. Follow up testosterone pending.    Hematocrit recently checked and was stable. PSA not sure if warranted; will check further with PCP.     I will contact the patient with the test results.  Return to clinic in 6-8 months.  Patient Instructions     Mykel & Savannah   It was a pleasure meeting you.    Secondary adrenal insufficiency  -Hydrocortisone (HC) 15 mg in the AM, 7.5 mg at 3:00 PM.   - sick day rules - increase hydrocortisone to 3X of the maintenance dose during sickness like flu.  (for eg. Hydrocortisone to be increased to 15 mg 3 times per day during illness until he gets better). Then after he improves from illness; dose needs to go back to his usual dose of 15 mg in the morning and 5 mg in the afternoon.   -Needs  injection form of hydrocortisone if unable to keep medication down due to vomiting.    - use Injectable form of hydrocortisone as needed.     Central hypothyroidism   -continue Levothyroxine 150 mcg daily     Central hypogonadism  -  testosterone dose to 0.3 ml (60 mg) every 7 days        Central diabetes insipidus   -Follow free water replacement therapy as previously advised  - change in mental status from baseline should prompt sodium level checks.   - If urine output increases to more than 3 L- please check sodium level.   - There is a standing order for Sodium checks as needed. Check a follow up sodium.        Half way between injection check testosterone level in May 2023  Orders Placed This Encounter   Procedures     T4 free     Testosterone total        Please let us know if any questions.      Test and/or medications prescribed today:  Orders Placed This Encounter   Procedures     T4 free     Testosterone total       Faizan Mclean MD  Staff Endocrinologist    Division of Endocrinology and Diabetes      Subjective:         HPI: Keyon Farias is a 62 year old male with history of panhypopituitarism.       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  Develops intermittently hyponatremia that needs treatment with DDAVP.   Over the last 12 months has not been on DDAVP. Last Na on normal. UO and intake is stable.     Secondary adrenal insufficiency  On Hydrocortisone (HC) 15 mg in the AM, 5mg at 3:00 PM     The lowest effective dose of hydrocortisone is what is recommended to be used.  Understands sick day rules - increase hydrocortisone to 2-3X of the maintenance dose during sickness like flu. Needs injection if unable to keep medication down due to vomiting.   Injectable form of hydrocortisone sent to the pharmacy to be used as needed.      Central hypothyroidism   -continue Levothyroxine 150 mcg daily.  Last free T4 within the recommended  range.     Central hypogonadism   -currently on testosterone 60 mg every 7 days.   - PSA stable.  hematocrit stable.    J-tube functioning well.      Urine output has been stable between 1-1.5 L/day.  Free water administration has been about 120 cc every 4 hours.    a month ago ER visit. Nothing was abnormal and he was sent back to home within hours.   Allergies   Allergen Reactions     Valproic Acid Other (See Comments)     Toxicity w/ bone marrow suspension, elevated ammonia levels      Dilantin [Phenytoin Sodium] Other (See Comments)     Severe Trembling     Scopolamine Hives     Hives with the patch - oral no problem       Current Outpatient Medications   Medication Sig Dispense Refill     acetaminophen (TYLENOL) 650 MG CR tablet Take 650 mg by mouth every 8 hours as needed for mild pain or fever       acetylcysteine (MUCOMYST) 20 % neb solution Take 2 mLs by nebulization 4 times daily With albuterol at 0700, 1100, 1500, and 1900 10 mL 3     albuterol (PROVENTIL) (5 MG/ML) 0.5% neb solution Take 0.5 mLs (2.5 mg) by nebulization every 4 hours (while awake) 0700 1100 1500 1900 with mucomyst 20 mL 3     azelastine (OPTIVAR) 0.05 % ophthalmic solution INSTILL 1 DROP IN AFFECTED EYE(S) TWICE DAILY FOR 10 DAYS 6 mL 0     bacitracin 500 UNIT/GM external ointment Apply topically daily as needed for wound care To PEG site. 30 g 3     Brivaracetam (BRIVIACT) 10 MG/ML solution 100 mg by Oral or Feeding Tube route 2 times daily 0900, 2100       calcium carbonate 1250 MG/5ML SUSP suspension TAKE 5 ML VIA FEEDING TUBE THREE TIMES DAILY AS DIRECTED 473 mL 3     carBAMazepine (TEGRETOL) 100 MG/5ML suspension Take 100 mg by mouth daily Take at 1800       carBAMazepine (TEGRETOL) 100 MG/5ML suspension 150 mg by Oral or Feeding Tube route 3 times daily At 06:00, 12:00, and 24:00 for seizures       COMPOUNDED NON-CONTROLLED SUBSTANCE (CMPD RX) - PHARMACY TO MIX COMPOUNDED MEDICATION Scopolamine 0.4mg capsules - take 1 capsule by  feeding tube three times daily as needed 90 capsule 1     glycopyrrolate (ROBINUL) 1 MG tablet Take 1 tablet (1 mg) by mouth 2 times daily as needed (secretions) 180 tablet 1     guaiFENesin (MUCINEX) 600 MG 12 hr tablet Take 1,200 mg by mouth 2 times daily as needed for congestion       hydrocortisone (CORTAID) 1 % external cream Apply topically 2 times daily as needed Apply to reddened memo areas as needed       hydrocortisone (CORTEF) 5 MG tablet Take 15 mg (3 tablets) in the morning and 5 mg (1 tablet)  at 2:00 PM. During illness patient takes more as a stress dose. Please increase the dose as directed. 400 tablet 3     levothyroxine (SYNTHROID/LEVOTHROID) 150 MCG tablet TAKE 1 TABLET(150 MCG) BY MOUTH DAILY 90 tablet 3     metoclopramide (REGLAN) 10 MG/10ML SOLN solution Take 10 mg by mouth 4 times daily (before meals and nightly) 0800, 1200, 1600, 2000  Disconnects bag before administration, then waits 45 mins before reconnecting after giving the medication       multivitamin, therapeutic (THERA-VIT) TABS tablet Take 1 tablet by mouth daily       mupirocin (BACTROBAN) 2 % external ointment Apply topically 2 times daily as needed 30 g 1     pantoprazole (PROTONIX) 2 mg/mL SUSP suspension TAKE 20 ML PER FEEDING TUBE DAILY 600 mL 3     testosterone cypionate (DEPOTESTOSTERONE) 200 MG/ML injection ADMINISTER 0.3 ML(60 MG) IN THE MUSCLE EVERY 7 DAYS 4 mL 3     vitamin C (ASCORBIC ACID) 1000 MG TABS 1,000 mg by Oral or Feeding Tube route daily        vitamin D3 (CHOLECALCIFEROL) 2000 units (50 mcg) tablet Take 2,000 Units by mouth daily Crush and feed via j-tube @@ 0900         Review of Systems      as per HPI above      Objective:   Mykel is engaged today.  Tries to use words to communicate.  Other exam was not completed as this was virtual visit  Wt Readings from Last 10 Encounters:   01/23/23 63.5 kg (140 lb)   01/09/23 63.5 kg (140 lb)   11/28/22 64.4 kg (141 lb 14.4 oz)   06/27/22 69 kg (152 lb 1.9 oz)    06/07/22 71 kg (156 lb 8 oz)   05/11/22 68.1 kg (150 lb 3.2 oz)   03/18/22 72.6 kg (160 lb)   12/11/21 75.2 kg (165 lb 12.6 oz)   11/17/21 73.6 kg (162 lb 3.2 oz)   10/30/21 75.2 kg (165 lb 12.6 oz)     In House Labs:   Hemoglobin A1C   Date Value Ref Range Status   11/20/2022 5.6 0.0 - 5.6 % Final     Comment:     Normal <5.7%   Prediabetes 5.7-6.4%    Diabetes 6.5% or higher     Note: Adopted from ADA consensus guidelines.   05/31/2022 5.5 0.0 - 5.6 % Final     Comment:     Normal <5.7%   Prediabetes 5.7-6.4%    Diabetes 6.5% or higher     Note: Adopted from ADA consensus guidelines.   05/23/2021 5.6 0 - 5.6 % Final     Comment:     Normal <5.7% Prediabetes 5.7-6.4%  Diabetes 6.5% or higher - adopted from ADA   consensus guidelines.     08/12/2020 5.9 (H) 0 - 5.6 % Final     Comment:     Normal <5.7% Prediabetes 5.7-6.4%  Diabetes 6.5% or higher - adopted from ADA   consensus guidelines.     08/12/2019 6.1 (H) 0 - 5.6 % Final     Comment:     Normal <5.7% Prediabetes 5.7-6.4%  Diabetes 6.5% or higher - adopted from ADA   consensus guidelines.         T4 Free   Date Value Ref Range Status   04/22/2021 1.40 0.76 - 1.46 ng/dL Final     Free T4   Date Value Ref Range Status   05/09/2022 1.42 0.76 - 1.46 ng/dL Final         Creatinine   Date Value Ref Range Status   01/15/2023 1.05 0.66 - 1.25 mg/dL Final   05/24/2021 0.80 0.66 - 1.25 mg/dL Final   ]  Last Comprehensive Metabolic Panel:  Sodium   Date Value Ref Range Status   01/15/2023 137 133 - 144 mmol/L Final   05/24/2021 143 133 - 144 mmol/L Final     Potassium   Date Value Ref Range Status   01/15/2023 4.1 3.4 - 5.3 mmol/L Final   05/24/2021 4.1 3.4 - 5.3 mmol/L Final     Chloride   Date Value Ref Range Status   01/15/2023 100 94 - 109 mmol/L Final   05/24/2021 112 (H) 94 - 109 mmol/L Final     Carbon Dioxide   Date Value Ref Range Status   05/24/2021 27 20 - 32 mmol/L Final     Carbon Dioxide (CO2)   Date Value Ref Range Status   01/15/2023 31 20 - 32 mmol/L  Final     Anion Gap   Date Value Ref Range Status   01/15/2023 6 3 - 14 mmol/L Final   05/24/2021 4 3 - 14 mmol/L Final     Glucose   Date Value Ref Range Status   01/15/2023 184 (H) 70 - 99 mg/dL Final   05/24/2021 141 (H) 70 - 99 mg/dL Final     Urea Nitrogen   Date Value Ref Range Status   01/15/2023 20 7 - 30 mg/dL Final   05/24/2021 9 7 - 30 mg/dL Final     Creatinine   Date Value Ref Range Status   01/15/2023 1.05 0.66 - 1.25 mg/dL Final   05/24/2021 0.80 0.66 - 1.25 mg/dL Final     GFR Estimate   Date Value Ref Range Status   01/15/2023 81 >60 mL/min/1.73m2 Final     Comment:     Effective December 21, 2021 eGFRcr in adults is calculated using the 2021 CKD-EPI creatinine equation which includes age and gender (Kyung barraza al., NEJ, DOI: 10.1056/MAMVbu2588166)   05/24/2021 >90 >60 mL/min/[1.73_m2] Final     Comment:     Non  GFR Calc  Starting 12/18/2018, serum creatinine based estimated GFR (eGFR) will be   calculated using the Chronic Kidney Disease Epidemiology Collaboration   (CKD-EPI) equation.       Calcium   Date Value Ref Range Status   01/15/2023 8.8 8.5 - 10.1 mg/dL Final   05/24/2021 8.3 (L) 8.5 - 10.1 mg/dL Final       Video-Visit Details    Type of service:  Video Visit    Video Start Time: 12:35 PM    Video End Time:1:01 PM  [place] Location (provider location):  Off-site.     Platform used for Video Visit: Orad  42 minutes spent on the date of the encounter doing chart review, history, counseling on management of panhypopituitarism, documentation and further activities per the note.

## 2023-02-08 NOTE — LETTER
2/8/2023       RE: Keyon Farias  [address] [place] [address]     Dear Colleague,    Thank you for referring your patient, Keyon Farias, to the [facility] [place] at [facility]. Please see a copy of my visit note below.      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   Over the last 12 months has not been on DDAVP. Recently checked Na was stable.     Secondary adrenal insufficiency   increase Hydrocortisone (HC) 15 mg in the AM, 7.5mg at 3:00 PM  (noted drop in his weight; Na stable)   The lowest effective dose of hydrocortisone is what is recommended to be used.  - sick day rules - increase hydrocortisone to 2-3X of the maintenance dose during sickness like flu. Needs injection if unable to keep medication down due to vomiting.   Injectable form of hydrocortisone sent to the pharmacy to be used as needed.      Central hypothyroidism   -continue Levothyroxine 150 mcg daily.  Last free T4 within the recommended range. Follow up lab ordered.      Central hypogonadism   -Last testosterone level is slightly higher than the recommended range therefore I have advised to reduce the dose of testosterone to 60 mg every 7 days from previous dose of 76 mg every 7 days. Follow up testosterone pending.    Hematocrit recently checked and was stable. PSA not sure if warranted; will check further with PCP.     I will contact the patient with the test results.  Return to clinic in 6-8 months.  Patient Instructions     Mykel Nuñez   It was a pleasure meeting you.    Secondary adrenal insufficiency  -Hydrocortisone (HC) 15 mg in the AM, 7.5 mg at 3:00 PM.   - sick day rules - increase  hydrocortisone to 3X of the maintenance dose during sickness like flu.  (for eg. Hydrocortisone to be increased to 15 mg 3 times per day during illness until he gets better). Then after he improves from illness; dose needs to go back to his usual dose of 15 mg in the morning and 5 mg in the afternoon.   -Needs injection form of hydrocortisone if unable to keep medication down due to vomiting.    - use Injectable form of hydrocortisone as needed.     Central hypothyroidism   -continue Levothyroxine 150 mcg daily     Central hypogonadism  -  testosterone dose to 0.3 ml (60 mg) every 7 days        Central diabetes insipidus   -Follow free water replacement therapy as previously advised  - change in mental status from baseline should prompt sodium level checks.   - If urine output increases to more than 3 L- please check sodium level.   - There is a standing order for Sodium checks as needed. Check a follow up sodium.        Half way between injection check testosterone level in May 2023  Orders Placed This Encounter   Procedures     T4 free     Testosterone total        Please let us know if any questions.      Test and/or medications prescribed today:  Orders Placed This Encounter   Procedures     T4 free     Testosterone total       Faizan Mclean MD  Staff Endocrinologist    Division of Endocrinology and Diabetes      Subjective:         HPI: Keyon Farias is a 62 year old male with history of panhypopituitarism.       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  Develops intermittently hyponatremia that needs treatment with DDAVP.   Over the last 12 months has not been on DDAVP. Last Na on normal. UO and intake is stable.     Secondary adrenal insufficiency  On Hydrocortisone (HC) 15 mg in the AM, 5mg at 3:00 PM     The lowest effective dose of hydrocortisone is what is recommended to be used.  Understands sick day rules - increase hydrocortisone to  2-3X of the maintenance dose during sickness like flu. Needs injection if unable to keep medication down due to vomiting.   Injectable form of hydrocortisone sent to the pharmacy to be used as needed.      Central hypothyroidism   -continue Levothyroxine 150 mcg daily.  Last free T4 within the recommended range.     Central hypogonadism   -currently on testosterone 60 mg every 7 days.   - PSA stable.  hematocrit stable.    J-tube functioning well.      Urine output has been stable between 1-1.5 L/day.  Free water administration has been about 120 cc every 4 hours.    a month ago ER visit. Nothing was abnormal and he was sent back to home within hours.   Allergies   Allergen Reactions     Valproic Acid Other (See Comments)     Toxicity w/ bone marrow suspension, elevated ammonia levels      Dilantin [Phenytoin Sodium] Other (See Comments)     Severe Trembling     Scopolamine Hives     Hives with the patch - oral no problem       Current Outpatient Medications   Medication Sig Dispense Refill     acetaminophen (TYLENOL) 650 MG CR tablet Take 650 mg by mouth every 8 hours as needed for mild pain or fever       acetylcysteine (MUCOMYST) 20 % neb solution Take 2 mLs by nebulization 4 times daily With albuterol at 0700, 1100, 1500, and 1900 10 mL 3     albuterol (PROVENTIL) (5 MG/ML) 0.5% neb solution Take 0.5 mLs (2.5 mg) by nebulization every 4 hours (while awake) 0700 1100 1500 1900 with mucomyst 20 mL 3     azelastine (OPTIVAR) 0.05 % ophthalmic solution INSTILL 1 DROP IN AFFECTED EYE(S) TWICE DAILY FOR 10 DAYS 6 mL 0     bacitracin 500 UNIT/GM external ointment Apply topically daily as needed for wound care To PEG site. 30 g 3     Brivaracetam (BRIVIACT) 10 MG/ML solution 100 mg by Oral or Feeding Tube route 2 times daily 0900, 2100       calcium carbonate 1250 MG/5ML SUSP suspension TAKE 5 ML VIA FEEDING TUBE THREE TIMES DAILY AS DIRECTED 473 mL 3     carBAMazepine (TEGRETOL) 100 MG/5ML suspension Take 100 mg by  mouth daily Take at 1800       carBAMazepine (TEGRETOL) 100 MG/5ML suspension 150 mg by Oral or Feeding Tube route 3 times daily At 06:00, 12:00, and 24:00 for seizures       COMPOUNDED NON-CONTROLLED SUBSTANCE (CMPD RX) - PHARMACY TO MIX COMPOUNDED MEDICATION Scopolamine 0.4mg capsules - take 1 capsule by feeding tube three times daily as needed 90 capsule 1     glycopyrrolate (ROBINUL) 1 MG tablet Take 1 tablet (1 mg) by mouth 2 times daily as needed (secretions) 180 tablet 1     guaiFENesin (MUCINEX) 600 MG 12 hr tablet Take 1,200 mg by mouth 2 times daily as needed for congestion       hydrocortisone (CORTAID) 1 % external cream Apply topically 2 times daily as needed Apply to reddened memo areas as needed       hydrocortisone (CORTEF) 5 MG tablet Take 15 mg (3 tablets) in the morning and 5 mg (1 tablet)  at 2:00 PM. During illness patient takes more as a stress dose. Please increase the dose as directed. 400 tablet 3     levothyroxine (SYNTHROID/LEVOTHROID) 150 MCG tablet TAKE 1 TABLET(150 MCG) BY MOUTH DAILY 90 tablet 3     metoclopramide (REGLAN) 10 MG/10ML SOLN solution Take 10 mg by mouth 4 times daily (before meals and nightly) 0800, 1200, 1600, 2000  Disconnects bag before administration, then waits 45 mins before reconnecting after giving the medication       multivitamin, therapeutic (THERA-VIT) TABS tablet Take 1 tablet by mouth daily       mupirocin (BACTROBAN) 2 % external ointment Apply topically 2 times daily as needed 30 g 1     pantoprazole (PROTONIX) 2 mg/mL SUSP suspension TAKE 20 ML PER FEEDING TUBE DAILY 600 mL 3     testosterone cypionate (DEPOTESTOSTERONE) 200 MG/ML injection ADMINISTER 0.3 ML(60 MG) IN THE MUSCLE EVERY 7 DAYS 4 mL 3     vitamin C (ASCORBIC ACID) 1000 MG TABS 1,000 mg by Oral or Feeding Tube route daily        vitamin D3 (CHOLECALCIFEROL) 2000 units (50 mcg) tablet Take 2,000 Units by mouth daily Crush and feed via j-tube @@ 0900         Review of Systems      as per HPI  above      Objective:   Mykel is engaged today.  Tries to use words to communicate.  Other exam was not completed as this was virtual visit  Wt Readings from Last 10 Encounters:   01/23/23 63.5 kg (140 lb)   01/09/23 63.5 kg (140 lb)   11/28/22 64.4 kg (141 lb 14.4 oz)   06/27/22 69 kg (152 lb 1.9 oz)   06/07/22 71 kg (156 lb 8 oz)   05/11/22 68.1 kg (150 lb 3.2 oz)   03/18/22 72.6 kg (160 lb)   12/11/21 75.2 kg (165 lb 12.6 oz)   11/17/21 73.6 kg (162 lb 3.2 oz)   10/30/21 75.2 kg (165 lb 12.6 oz)     In House Labs:   Hemoglobin A1C   Date Value Ref Range Status   11/20/2022 5.6 0.0 - 5.6 % Final     Comment:     Normal <5.7%   Prediabetes 5.7-6.4%    Diabetes 6.5% or higher     Note: Adopted from ADA consensus guidelines.   05/31/2022 5.5 0.0 - 5.6 % Final     Comment:     Normal <5.7%   Prediabetes 5.7-6.4%    Diabetes 6.5% or higher     Note: Adopted from ADA consensus guidelines.   05/23/2021 5.6 0 - 5.6 % Final     Comment:     Normal <5.7% Prediabetes 5.7-6.4%  Diabetes 6.5% or higher - adopted from ADA   consensus guidelines.     08/12/2020 5.9 (H) 0 - 5.6 % Final     Comment:     Normal <5.7% Prediabetes 5.7-6.4%  Diabetes 6.5% or higher - adopted from ADA   consensus guidelines.     08/12/2019 6.1 (H) 0 - 5.6 % Final     Comment:     Normal <5.7% Prediabetes 5.7-6.4%  Diabetes 6.5% or higher - adopted from ADA   consensus guidelines.         T4 Free   Date Value Ref Range Status   04/22/2021 1.40 0.76 - 1.46 ng/dL Final     Free T4   Date Value Ref Range Status   05/09/2022 1.42 0.76 - 1.46 ng/dL Final         Creatinine   Date Value Ref Range Status   01/15/2023 1.05 0.66 - 1.25 mg/dL Final   05/24/2021 0.80 0.66 - 1.25 mg/dL Final   ]  Last Comprehensive Metabolic Panel:  Sodium   Date Value Ref Range Status   01/15/2023 137 133 - 144 mmol/L Final   05/24/2021 143 133 - 144 mmol/L Final     Potassium   Date Value Ref Range Status   01/15/2023 4.1 3.4 - 5.3 mmol/L Final   05/24/2021 4.1 3.4 - 5.3 mmol/L  Final     Chloride   Date Value Ref Range Status   01/15/2023 100 94 - 109 mmol/L Final   05/24/2021 112 (H) 94 - 109 mmol/L Final     Carbon Dioxide   Date Value Ref Range Status   05/24/2021 27 20 - 32 mmol/L Final     Carbon Dioxide (CO2)   Date Value Ref Range Status   01/15/2023 31 20 - 32 mmol/L Final     Anion Gap   Date Value Ref Range Status   01/15/2023 6 3 - 14 mmol/L Final   05/24/2021 4 3 - 14 mmol/L Final     Glucose   Date Value Ref Range Status   01/15/2023 184 (H) 70 - 99 mg/dL Final   05/24/2021 141 (H) 70 - 99 mg/dL Final     Urea Nitrogen   Date Value Ref Range Status   01/15/2023 20 7 - 30 mg/dL Final   05/24/2021 9 7 - 30 mg/dL Final     Creatinine   Date Value Ref Range Status   01/15/2023 1.05 0.66 - 1.25 mg/dL Final   05/24/2021 0.80 0.66 - 1.25 mg/dL Final     GFR Estimate   Date Value Ref Range Status   01/15/2023 81 >60 mL/min/1.73m2 Final     Comment:     Effective December 21, 2021 eGFRcr in adults is calculated using the 2021 CKD-EPI creatinine equation which includes age and gender (Kyung et al., NEJM, DOI: 10.1056/BRBRpf8213873)   05/24/2021 >90 >60 mL/min/[1.73_m2] Final     Comment:     Non  GFR Calc  Starting 12/18/2018, serum creatinine based estimated GFR (eGFR) will be   calculated using the Chronic Kidney Disease Epidemiology Collaboration   (CKD-EPI) equation.       Calcium   Date Value Ref Range Status   01/15/2023 8.8 8.5 - 10.1 mg/dL Final   05/24/2021 8.3 (L) 8.5 - 10.1 mg/dL Final       Video-Visit Details    Type of service:  Video Visit    Video Start Time: 12:35 PM    Video End Time:1:01 PM  Distant Location (provider location):  Off-site.     Platform used for Video Visit: [employer]  42 minutes spent on the date of the encounter doing chart review, history, counseling on management of panhypopituitarism, documentation and further activities per the note.       Faizan Mclean MD

## 2023-02-08 NOTE — PATIENT INSTRUCTIONS
Mykel & Savannah   It was a pleasure meeting you.    Secondary adrenal insufficiency  -Hydrocortisone (HC) 15 mg in the AM, 7.5 mg at 3:00 PM.   - sick day rules - increase hydrocortisone to 3X of the maintenance dose during sickness like flu.  (for eg. Hydrocortisone to be increased to 15 mg 3 times per day during illness until he gets better). Then after he improves from illness; dose needs to go back to his usual dose of 15 mg in the morning and 5 mg in the afternoon.   -Needs injection form of hydrocortisone if unable to keep medication down due to vomiting.    - use Injectable form of hydrocortisone as needed.     Central hypothyroidism   -continue Levothyroxine 150 mcg daily     Central hypogonadism  -  testosterone dose to 0.3 ml (60 mg) every 7 days        Central diabetes insipidus   -Follow free water replacement therapy as previously advised  - change in mental status from baseline should prompt sodium level checks.   - If urine output increases to more than 3 L- please check sodium level.   - There is a standing order for Sodium checks as needed. Check a follow up sodium.        Half way between injection check testosterone level in May 2023  Orders Placed This Encounter   Procedures    T4 free    Testosterone total        Please let us know if any questions.

## 2023-02-08 NOTE — NURSING NOTE
Is the patient currently in the state of [place]? YES    Visit mode:VIDEO    If the visit is dropped, the patient can be reconnected by: VIDEO VISIT: Text to cell phone: [phone]    Will anyone else be joining the visit? NO      How would you like to obtain your AVS? MyChart    Are changes needed to the allergy or medication list? NO    Comments or concerns related to today's visit:

## 2023-02-09 ENCOUNTER — TELEPHONE (OUTPATIENT)
Dept: FAMILY MEDICINE | Facility: CLINIC | Age: 63
End: 2023-02-09
Payer: MEDICARE

## 2023-02-09 NOTE — TELEPHONE ENCOUNTER
Central Prior Authorization Team   Phone: [phone]      PA Initiation    Medication: testosterone cypionate (DEPOTESTOSTERONE) 200 MG/ML injection - PA INITIATED  Insurance Company: [employer] Part D - Phone [phone] Fax [phone]  Pharmacy Filling the Rx: [id number] - [place], [place] - [address] AT [facility] [address]  Filling Pharmacy Phone: [phone]  Filling Pharmacy Fax:    Start Date: 2/9/2023

## 2023-02-09 NOTE — TELEPHONE ENCOUNTER
Prior Authorization Retail Medication Request     Medication/Dose:   Testosterone Cypionate 200 MG/ML     ICD code (if different than what is on RX):    Previously Tried and Failed:    Rationale:      Insurance Name:    Insurance ID:        Pharmacy Information (if different than what is on RX)  Name:    Phone:      [facility] Triage Team    requesting ASAP as pt has been overdue for injection    [facility] Triage Team

## 2023-02-09 NOTE — TELEPHONE ENCOUNTER
PRIOR AUTHORIZATION DENIED    Medication: testosterone cypionate (DEPOTESTOSTERONE) 200 MG/ML injection - PA DENIED    Denial Date: 2/9/2023    Denial Rational:  OF REQUESTED DRUG IS NON-PARTICIPATE IN THE MEDICARE GAP DISCOUNT PROGRAM THEREFORE IT IS NOT COVERED      Appeal Information: IF PROVIDER WOULD LIKE TO APPEAL THIS DECISION PLEASE PROVIDE PA TEAM WITH LETTER OF MEDICAL NECESSITY

## 2023-02-13 DIAGNOSIS — Z53.9 DIAGNOSIS NOT YET DEFINED: Primary | ICD-10-CM

## 2023-02-13 PROCEDURE — G0179 MD RECERTIFICATION HHA PT: HCPCS | Performed by: INTERNAL MEDICINE

## 2023-02-15 ENCOUNTER — VIRTUAL VISIT (OUTPATIENT)
Dept: CARDIOLOGY | Facility: CLINIC | Age: 63
End: 2023-02-15
Attending: INTERNAL MEDICINE
Payer: MEDICARE

## 2023-02-15 VITALS
SYSTOLIC BLOOD PRESSURE: 110 MMHG | WEIGHT: 163 LBS | BODY MASS INDEX: 22.08 KG/M2 | DIASTOLIC BLOOD PRESSURE: 63 MMHG | HEART RATE: 84 BPM | HEIGHT: 72 IN

## 2023-02-15 DIAGNOSIS — Q24.8 LEFT VENTRICULAR OUTFLOW TRACT OBSTRUCTION: ICD-10-CM

## 2023-02-15 DIAGNOSIS — I42.1 HYPERTROPHIC OBSTRUCTIVE CARDIOMYOPATHY (H): Primary | ICD-10-CM

## 2023-02-15 PROCEDURE — 99203 OFFICE O/P NEW LOW 30 MIN: CPT | Mod: 95 | Performed by: PHYSICIAN ASSISTANT

## 2023-02-15 NOTE — LETTER
2/15/2023    JACOB BERGERON MD  [address] [place] [address]    RE: Keyon Farias       Dear Colleague,     I had the pleasure of seeing Keyon Farias in the [facility].  Keyon is a 62 year old who is being evaluated via a billable video visit.      How would you like to obtain your AVS? Mail a copy     If the video visit is dropped, the invitation should be resent by: Text to cell phone: [phone]     Will anyone else be joining your video visit? No       Review Of Systems  Respiratory: NEGATIVE  Cardiovascular:NEGATIVE    Vitals - Patient Reported  Systolic (Patient Reported): 110  Diastolic (Patient Reported): 63  Weight (Patient Reported): 73.9 kg (163 lb)  Pulse (Patient Reported): 84    LISA Dumont    Telephone number of patient: [phone]    Video-Visit Details    Type of service:  Video Visit DOX    -----------------------------------------------------------------------------------------------------------------------------    Primary Cardiologist: Dr. Washington    Reason for Video Visit: 3 month hospital follow up     HPI:  Keyon is a very pleasant 62-year-old male with past medical history notable for TBI with aphasia, right-sided spastic hemiplegia, dysphagia requiring GJ tube, seizure disorder, frequent urinary infections who was admitted in 11/2022 with sepsis secondary to aspiration pneumonia.  Cardiology was consulted due to echocardiogram findings.  Of note his blood pressure was 98/50 during the echocardiogram study.  He had a resting LVOT gradient of 33 mmHg.  The study read normal LV wall thickness with no clear evidence of chordal SURI but Dr. Washington read the study as well and felt that there was hyperdynamic LV function as well as chordal SURI and proximal septal wall thickening.  She recommended outpatient Holter monitor as well as cardiac MRI to rule out hypertrophic cardiomyopathy.  There was also questionable history of ventricular fibrillation and  patient's record by there were no further details that could be obtained regarding this.  He did not have any ventricular tachyarrhythmias during his admission.    Today, I have a video visit with Keyon and his mother who is his guardian, Savannah, for hospital follow-up.  Savannah tells me he is doing well and has been more stable since his admission.  She does not recall any history of ventricular fibrillation in the family or in Keyon's history.  She does remember having palpitations when she was younger but she was told this was a benign type.  There is no sudden cardiac death history in the family.  Keyon is not on any beta-blocker therapy and not sure if he would tolerated as his pulse is in the 50s 60s at baseline his blood pressure is soft.  They were unable to complete cardiac MRI that was ordered as respiration instructions would be to challenging for Keyon.    ROS:  12-pt ROS is negative except for as noted above.     Physical Exam:  A limited exam was conducted via video.  Constitutional:  Patient is pleasant, alert, cooperative, and in NAD.  Patient is aphasic but able to acknowledge my comments.  HEENT:  NCAT. EOM's intact.   Neck:  CVP appears normal.   Pulmonary: Normal respiratory effort.   Extremities: No edema, erythema, cyanosis appreciated.  Skin:  No rashes or lesions appreciated.   Neurological:  No gross motor or sensory deficits.   Psych: Appropriate affect.       A/P:   Keyon is a very pleasant 62-year-old male with past medical history notable for TBI with aphasia, right-sided spastic hemiplegia, dysphagia requiring GJ tube, seizure disorder, frequent urinary infections who was admitted in 11/2022 with sepsis secondary to aspiration pneumonia.     During his admission cardiology was consulted due to echocardiogram findings of resting LVOT gradient of 33 m of mercury with hyperdynamic LV function.  Dr. Washington also felt that there was chordal SURI and proximal septal wall thickening suggesting  possible hypertrophic cardiomyopathy.  Keyon unfortunately cannot complete cardiac MRI for further evaluation of this.  His 48-hour Holter monitor following his admission showed no evidence of ventricular tachyarrhythmias.  There is questionable history of ventricular fibrillation under his diagnoses in his chart but his mother, Savannah, does not recall any rhythm issues in his or his family history.  There is no sudden cardiac death history.  Were not able to add beta-blocker to his regimen due to his vitals as described above.  At this time I recommended we repeat another echocardiogram now that he is more stable from sepsis standpoint.  Depending on the results we will decide on further recommendations.  Savannah and Keyon were in agreement with this plan.    Video start time: 8:10 AM  Video end time: 8:30 AM  Originating Location (pt. Location): home  Distant Location (provider location):  [facility]   Mode of Communication:  Video Conference via [email or web address] phone application       This visit is being conducted as a virtual visit due to the emphasis on mitigation of the COVID-19 virus pandemic. The clinician has decided that the risk of an in-office visit outweighs the benefit for this patient. The rest of the comprehensive physical examination is deferred due to public health emergency video visit restrictions.         Moni Marin PA-C   2/15/2023  Pager: [phone]    Thank you for allowing me to participate in the care of your patient.      Sincerely,     Moni Marin PA-C     [facility]  cc:   Gege Washington MD  [address] [place] [id number]

## 2023-02-15 NOTE — PATIENT INSTRUCTIONS
Today's Plan:   -- Let's repeat echo (heart ultrasound) to see what his heart function is like now that he has no sepsis (infection).   -- Let me know if there is family history of ventricular fibrillation.     If you have questions or concerns please call my nurse team at [phone].     Scheduling phone number: [phone].  Reminder: Please bring in all current medications, over the counter supplements and vitamin bottles to your next appointment.    It was a pleasure seeing you today!

## 2023-02-15 NOTE — PROGRESS NOTES
Keyon is a 62 year old who is being evaluated via a billable video visit.      How would you like to obtain your AVS? Mail a copy     If the video visit is dropped, the invitation should be resent by: Text to cell phone: [phone]     Will anyone else be joining your video visit? No       Review Of Systems  Respiratory: NEGATIVE  Cardiovascular:NEGATIVE    Vitals - Patient Reported  Systolic (Patient Reported): 110  Diastolic (Patient Reported): 63  Weight (Patient Reported): 73.9 kg (163 lb)  Pulse (Patient Reported): 84    LISA Dumont    Telephone number of patient: [phone]    Video-Visit Details    Type of service:  Video Visit DOX    -----------------------------------------------------------------------------------------------------------------------------    Primary Cardiologist: Dr. Washington    Reason for Video Visit: 3 month hospital follow up     HPI:  Keyon is a very pleasant 62-year-old male with past medical history notable for TBI with aphasia, right-sided spastic hemiplegia, dysphagia requiring GJ tube, seizure disorder, frequent urinary infections who was admitted in 11/2022 with sepsis secondary to aspiration pneumonia.  Cardiology was consulted due to echocardiogram findings.  Of note his blood pressure was 98/50 during the echocardiogram study.  He had a resting LVOT gradient of 33 mmHg.  The study read normal LV wall thickness with no clear evidence of chordal SURI but Dr. Washington read the study as well and felt that there was hyperdynamic LV function as well as chordal SURI and proximal septal wall thickening.  She recommended outpatient Holter monitor as well as cardiac MRI to rule out hypertrophic cardiomyopathy.  There was also questionable history of ventricular fibrillation and patient's record by there were no further details that could be obtained regarding this.  He did not have any ventricular tachyarrhythmias during his admission.    Today, I have a video visit with Keyon and  his mother who is his guardian, Savannah, for hospital follow-up.  Savannah tells me he is doing well and has been more stable since his admission.  She does not recall any history of ventricular fibrillation in the family or in Keyon's history.  She does remember having palpitations when she was younger but she was told this was a benign type.  There is no sudden cardiac death history in the family.  Keyon is not on any beta-blocker therapy and not sure if he would tolerated as his pulse is in the 50s 60s at baseline his blood pressure is soft.  They were unable to complete cardiac MRI that was ordered as respiration instructions would be to challenging for Keyon.    ROS:  12-pt ROS is negative except for as noted above.     Physical Exam:  A limited exam was conducted via video.  Constitutional:  Patient is pleasant, alert, cooperative, and in NAD.  Patient is aphasic but able to acknowledge my comments.  HEENT:  NCAT. EOM's intact.   Neck:  CVP appears normal.   Pulmonary: Normal respiratory effort.   Extremities: No edema, erythema, cyanosis appreciated.  Skin:  No rashes or lesions appreciated.   Neurological:  No gross motor or sensory deficits.   Psych: Appropriate affect.       A/P:   Keyon is a very pleasant 62-year-old male with past medical history notable for TBI with aphasia, right-sided spastic hemiplegia, dysphagia requiring GJ tube, seizure disorder, frequent urinary infections who was admitted in 11/2022 with sepsis secondary to aspiration pneumonia.     During his admission cardiology was consulted due to echocardiogram findings of resting LVOT gradient of 33 m of mercury with hyperdynamic LV function.  Dr. Washington also felt that there was chordal SURI and proximal septal wall thickening suggesting possible hypertrophic cardiomyopathy.  Keyon unfortunately cannot complete cardiac MRI for further evaluation of this.  His 48-hour Holter monitor following his admission showed no evidence of ventricular  tachyarrhythmias.  There is questionable history of ventricular fibrillation under his diagnoses in his chart but his mother, Savannah, does not recall any rhythm issues in his or his family history.  There is no sudden cardiac death history.  Were not able to add beta-blocker to his regimen due to his vitals as described above.  At this time I recommended we repeat another echocardiogram now that he is more stable from sepsis standpoint.  Depending on the results we will decide on further recommendations.  Savannah and Keyon were in agreement with this plan.    Video start time: 8:10 AM  Video end time: 8:30 AM  Originating Location (pt. Location): home  Distant Location (provider location):  [facility]   Mode of Communication:  Video Conference via [employer] phone application       This visit is being conducted as a virtual visit due to the emphasis on mitigation of the COVID-19 virus pandemic. The clinician has decided that the risk of an in-office visit outweighs the benefit for this patient. The rest of the comprehensive physical examination is deferred due to public health emergency video visit restrictions.         Moni Marin PA-C   2/15/2023  Pager: [phone]

## 2023-02-22 DIAGNOSIS — R68.89 EXCESSIVE ORAL SECRETIONS: ICD-10-CM

## 2023-02-22 DIAGNOSIS — J69.0 ASPIRATION PNEUMONITIS (H): ICD-10-CM

## 2023-02-22 RX ORDER — ALBUTEROL SULFATE 5 MG/ML
2.5 SOLUTION RESPIRATORY (INHALATION)
Qty: 240 ML | Refills: 3 | Status: SHIPPED | OUTPATIENT
Start: 2023-02-22 | End: 2023-04-26

## 2023-02-22 RX ORDER — ACETYLCYSTEINE 200 MG/ML
2 SOLUTION ORAL; RESPIRATORY (INHALATION) 4 TIMES DAILY
Qty: 730 ML | Refills: 3 | Status: SHIPPED | OUTPATIENT
Start: 2023-02-22 | End: 2023-05-10

## 2023-02-22 NOTE — TELEPHONE ENCOUNTER
"Notes from 2/15/23 state \"not taking\" these medications     Spoke with legal guardian Savannah    Was not taking due to machine was broken, has gotten a new machine and she wants to give patient the medication     Brooke GOMEZ, Triage RN  [facility]     "

## 2023-02-22 NOTE — PLAN OF CARE
Problem: Patient Care Overview  Goal: Plan of Care/Patient Progress Review  Outcome: Improving  Turn and repo q2hr. Urinating very frequently, fluids d/c. Continuous enteral feed with flush q4hr. Up to chair x1 today. powder to tx coccyx. Lungs clear. BS active. Starting on  Augmentin suspension. Probable to d/c in next couple of days. continue to monitor.       Incubation Time: 03:00:00

## 2023-03-09 ENCOUNTER — TELEPHONE (OUTPATIENT)
Dept: FAMILY MEDICINE | Facility: CLINIC | Age: 63
End: 2023-03-09
Payer: MEDICARE

## 2023-03-09 NOTE — TELEPHONE ENCOUNTER
Dr. Queen, this is FYI per the home nurse.     Patient had an elevated pulse today while she came to his home.     The pulse was . He was in a warm room.     He is well  hydrated with tube feedings.     No fever.     Home care will continue to monitor. The rest of his vital signs are normal.     Yvette Barraza RN  -[facility]

## 2023-03-10 ENCOUNTER — APPOINTMENT (OUTPATIENT)
Dept: CT IMAGING | Facility: CLINIC | Age: 63
End: 2023-03-10
Attending: EMERGENCY MEDICINE
Payer: MEDICARE

## 2023-03-10 ENCOUNTER — HOSPITAL ENCOUNTER (EMERGENCY)
Facility: CLINIC | Age: 63
Discharge: HOME | End: 2023-03-10
Attending: EMERGENCY MEDICINE | Admitting: EMERGENCY MEDICINE
Payer: MEDICARE

## 2023-03-10 VITALS
DIASTOLIC BLOOD PRESSURE: 79 MMHG | TEMPERATURE: 98.3 F | SYSTOLIC BLOOD PRESSURE: 104 MMHG | HEIGHT: 72 IN | HEART RATE: 98 BPM | BODY MASS INDEX: 22.08 KG/M2 | WEIGHT: 163 LBS | OXYGEN SATURATION: 94 % | RESPIRATION RATE: 20 BRPM

## 2023-03-10 DIAGNOSIS — R53.83 OTHER FATIGUE: ICD-10-CM

## 2023-03-10 LAB
ALBUMIN UR-MCNC: 30 MG/DL
APPEARANCE UR: CLEAR
BILIRUB UR QL STRIP: NEGATIVE
COLOR UR AUTO: YELLOW
GLUCOSE UR STRIP-MCNC: NEGATIVE MG/DL
HGB UR QL STRIP: NEGATIVE
HYALINE CASTS: 1 /LPF
KETONES UR STRIP-MCNC: NEGATIVE MG/DL
LEUKOCYTE ESTERASE UR QL STRIP: NEGATIVE
MUCOUS THREADS #/AREA URNS LPF: PRESENT /LPF
NITRATE UR QL: NEGATIVE
PH UR STRIP: 7 [PH] (ref 5–7)
RBC URINE: 1 /HPF
SP GR UR STRIP: 1.03 (ref 1–1.03)
SQUAMOUS EPITHELIAL: <1 /HPF
UROBILINOGEN UR STRIP-MCNC: 8 MG/DL
WBC URINE: <1 /HPF

## 2023-03-10 PROCEDURE — 99284 EMERGENCY DEPT VISIT MOD MDM: CPT | Mod: 25

## 2023-03-10 PROCEDURE — 70450 CT HEAD/BRAIN W/O DYE: CPT | Mod: MA

## 2023-03-10 PROCEDURE — 81001 URINALYSIS AUTO W/SCOPE: CPT | Performed by: EMERGENCY MEDICINE

## 2023-03-10 ASSESSMENT — ACTIVITIES OF DAILY LIVING (ADL): ADLS_ACUITY_SCORE: 37

## 2023-03-11 NOTE — ED PROVIDER NOTES
History     Chief Complaint:  Fatigue     HPI   Keyon Farias is a 62 year old male with a history of ventricular fibrillation, DVT, seizures, septic shock, and aphasia who presents with fatigue.    Patient's mother reports that he has been more fatigued and she has had more difficulty waking him. She noted a temperature of 95.5 axillary and that his blood pressures have bee low around 70 systolic. She has been providing additional fluids through his feeding tube. She denies fevers.  No recent Tylenol.    Independent Historian:   Mother - They report that the patient has been more difficult to wake up and has had low blood pressures.    Review of External Notes:   Multiple prior ED visits and hospitalization discharge summaries, most recently 11/28/2022 discharge summary    ROS:  Review of Systems    Allergies:  Valproic Acid  Dilantin [Phenytoin Sodium]  Scopolamine     Medications:    Albuterol   Synthroid   Reglan   Protonix   Cortef     Past Medical History:    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)   DVT of upper extremity (deep vein thrombosis) (H)   Gastro-oesophageal reflux disease   Panhypopituitarism (H)   Pneumonia   Seizures (H)   Sepsis due to urinary tract infection (H)   Septic shock (H)   Spastic hemiplegia affecting dominant side (H)   Thyroid disease  Unspecified cerebral artery occlusion with cerebral infarction   Ventricular fibrillation   Ventricular tachyarrhythmia     Past Surgical History:    Tracheostomy   Gastro jejunostomy tube replacement     Family History:    Father: hypertension, kidney cancer  Mother: PE    Social History:  Patient presents alone   PCP: Elsa Queen     Physical Exam     Patient Vitals for the past 24 hrs:   BP Temp Temp src Pulse Resp SpO2 Height Weight   03/10/23 2030 107/80 -- -- 102 -- 94 % -- --   03/10/23 2015 115/72 -- -- 97 -- 96 % -- --   03/10/23 1945 113/68 -- -- 98 -- 96 % -- --   03/10/23 1930 109/70 -- -- 100 -- 98 % -- --   03/10/23 1915  103/73 -- -- 103 -- 96 % -- --   03/10/23 1909 115/74 98.3  F (36.8  C) Axillary 99 20 95 % 1.829 m (6') 73.9 kg (163 lb)        Physical Exam  General:  Sitting on bed, comfortable appearing.   HENT:  No obvious trauma to head  Right Ear:  External ear normal.   Left Ear:  External ear normal.   Nose:  Nose normal.   Eyes:  Conjunctivae and EOM are normal.  Neck: Normal range of motion. Neck supple. No tracheal deviation present.   Heart: Regular rate and rhythm, not tachycardic.  Pulm/Chest: No respiratory distress  Abdominal: No abdominal tenderness, guarding, rigidity or rebound tenderness.  Feeding tube in place.  M/S: Normal range of motion.  Chronic contractures appreciated.  Neuro: Alert. GCS 15.  Skin: Skin is warm and dry. No rash noted. Not diaphoretic.   Psych: Normal mood and affect. Behavior is normal.     Emergency Department Course   Imaging:  Head CT w/o contrast   Final Result   IMPRESSION:   1.  No acute intracranial process or significant change since 05/29/2022.        Report per radiology    Laboratory:  Labs Ordered and Resulted from Time of ED Arrival to Time of ED Departure   ROUTINE UA WITH MICROSCOPIC REFLEX TO CULTURE - Abnormal       Result Value    Color Urine Yellow      Appearance Urine Clear      Glucose Urine Negative      Bilirubin Urine Negative      Ketones Urine Negative      Specific Gravity Urine 1.027      Blood Urine Negative      pH Urine 7.0      Protein Albumin Urine 30 (*)     Urobilinogen Urine 8.0 (*)     Nitrite Urine Negative      Leukocyte Esterase Urine Negative      Mucus Urine Present (*)     RBC Urine 1      WBC Urine <1      Squamous Epithelials Urine <1      Hyaline Casts Urine 1          Emergency Department Course & Assessments:  Interventions:  Medications - No data to display     Assessments:  1920 I obtained history and examined the patient as noted above.     Independent Interpretation (X-rays, CTs, rhythm strip):  Agree with radiology report.      Consultations/Discussion of Management or Tests:  1925 I spoke with the patient's mother regarding patient's presentation, findings, and plan of care.    2041 I updated patient's mother regarding his findings.     Social Determinants of Health affecting care:   None    Disposition:  The patient was discharged to home.     Impression & Plan    Medical Decision Making:  Keyon Farias is a very pleasant nonverbal 62 year old year old patient who presents to the emergency department because his mother thought he was more tired than normal.  She also found that he had some low blood pressures at home.  She provided him additional fluids through his feeding tube.  The patient is not hypotensive here.  He is hemodynamically stable.  He has no fever.  Mother confirmed no recent antipyretics.  The patient has a benign nonsurgical abdomen.  He is not hypoxic and there are no coarse breath sounds to suggest pneumonia.  Straight cath urine shows no evidence of urinary tract infection.  CT of the head shows no evidence of stroke or intracranial hemorrhage.  I appreciate no acute skin lesions or signs of cellulitis.  I do not have definitive etiology at this time of why he is more fatigued, but he is awake and alert at his baseline I believe he is safe for discharge.  He is a difficult stick and with normal vital signs and unremarkable work-up as above, I do not believe labs are indicated at this time.  I called the mother to obtain the initial history and updated her with all of this and she agrees with plan of care to be discharged.  Transport was arranged for him to go back home.    The treatment plan was discussed with the patient and they expressed understanding of this plan and consented to the plan.  In addition, the patient will return to the emergency department if their symptoms persist, worsen, if new symptoms arise or if there is any concern as other pathology may be present that is not evident at this time. They  also understand the importance of close follow up in the clinic and if unable to do so will return to the emergency department for a reevaluation. All questions were answered.    Diagnosis:    ICD-10-CM    1. Other fatigue  R53.83               Rob Padilla  3/10/2023   Tyler Epperson, Tyler Vora DO  03/10/23 2122

## 2023-03-11 NOTE — ED TRIAGE NOTES
Pt BIBA from home where he is cared for by his mother. Mother reports pt was difficult to awaken today, less alert than his baseline, and mother had BP readings as low as 70 systolic. With pt's history of previous sepsis infections she asked for him to be evaluated today.      Triage Assessment     Row Name 03/10/23 1910       Triage Assessment (Adult)    Airway WDL WDL       Respiratory WDL    Respiratory WDL WDL       Skin Circulation/Temperature WDL    Skin Circulation/Temperature WDL WDL       Cardiac WDL    Cardiac WDL WDL       Peripheral/Neurovascular WDL    Peripheral Neurovascular WDL WDL       Cognitive/Neuro/Behavioral WDL    Cognitive/Neuro/Behavioral WDL WDL

## 2023-03-14 ENCOUNTER — MEDICAL CORRESPONDENCE (OUTPATIENT)
Dept: HEALTH INFORMATION MANAGEMENT | Facility: CLINIC | Age: 63
End: 2023-03-14

## 2023-04-05 ENCOUNTER — TELEPHONE (OUTPATIENT)
Dept: CARDIOLOGY | Facility: CLINIC | Age: 63
End: 2023-04-05

## 2023-04-05 ENCOUNTER — HOSPITAL ENCOUNTER (OUTPATIENT)
Dept: CARDIOLOGY | Facility: CLINIC | Age: 63
Discharge: HOME | End: 2023-04-05
Attending: PHYSICIAN ASSISTANT | Admitting: PHYSICIAN ASSISTANT
Payer: MEDICARE

## 2023-04-05 DIAGNOSIS — Q24.8 LEFT VENTRICULAR OUTFLOW TRACT OBSTRUCTION: Primary | ICD-10-CM

## 2023-04-05 DIAGNOSIS — Q24.8 LEFT VENTRICULAR OUTFLOW TRACT OBSTRUCTION: ICD-10-CM

## 2023-04-05 DIAGNOSIS — I42.1 HYPERTROPHIC OBSTRUCTIVE CARDIOMYOPATHY (H): ICD-10-CM

## 2023-04-05 LAB — LVEF ECHO: NORMAL

## 2023-04-05 PROCEDURE — 93306 TTE W/DOPPLER COMPLETE: CPT

## 2023-04-05 PROCEDURE — 93306 TTE W/DOPPLER COMPLETE: CPT | Mod: 26 | Performed by: INTERNAL MEDICINE

## 2023-04-05 NOTE — TELEPHONE ENCOUNTER
----- Message from Apolinar Cavazos MD sent at 11/21/2022  4:05 PM CST -----  Please notify patient, colon polyps were benign, repeat in 2 years. Needs genetic counseling given large number of polyps found. Consultation sheet placed on Freya's desk. Thanks.   ----- Message from Moni Marin PA-C sent at 4/5/2023  4:26 PM CDT -----  Echo looks much better since the resolution of sepsis. No hyperdynamic function or LVOT obstruction. No changes at this time.     Thanks,   Moni      Pt has no return orders, routed back to provider to verify follow up plan. Primary cardiologist is Dr Ray.       4/6/2023 1300 reply from RADHA Moni Marin:  6 mo w dr ray.     Moni      Spoke with patient's mother, Savannah (guardian), to review echo results and update from RADHA Moni Marin. Reviewed plan for 6 month follow up.

## 2023-04-06 ENCOUNTER — HOSPITAL ENCOUNTER (EMERGENCY)
Facility: CLINIC | Age: 63
Discharge: HOME | End: 2023-04-07
Attending: EMERGENCY MEDICINE | Admitting: EMERGENCY MEDICINE
Payer: MEDICARE

## 2023-04-06 ENCOUNTER — APPOINTMENT (OUTPATIENT)
Dept: GENERAL RADIOLOGY | Facility: CLINIC | Age: 63
End: 2023-04-06
Attending: EMERGENCY MEDICINE
Payer: MEDICARE

## 2023-04-06 DIAGNOSIS — R05.1 ACUTE COUGH: ICD-10-CM

## 2023-04-06 LAB
ANION GAP SERPL CALCULATED.3IONS-SCNC: 9 MMOL/L (ref 7–15)
BASOPHILS # BLD AUTO: 0.1 10E3/UL (ref 0–0.2)
BASOPHILS NFR BLD AUTO: 1 %
BUN SERPL-MCNC: 19.7 MG/DL (ref 8–23)
CALCIUM SERPL-MCNC: 8.6 MG/DL (ref 8.8–10.2)
CHLORIDE SERPL-SCNC: 99 MMOL/L (ref 98–107)
CREAT SERPL-MCNC: 0.92 MG/DL (ref 0.67–1.17)
DEPRECATED HCO3 PLAS-SCNC: 29 MMOL/L (ref 22–29)
EOSINOPHIL # BLD AUTO: 0.4 10E3/UL (ref 0–0.7)
EOSINOPHIL NFR BLD AUTO: 5 %
ERYTHROCYTE [DISTWIDTH] IN BLOOD BY AUTOMATED COUNT: 13.6 % (ref 10–15)
FLUAV RNA SPEC QL NAA+PROBE: NEGATIVE
FLUBV RNA RESP QL NAA+PROBE: NEGATIVE
GFR SERPL CREATININE-BSD FRML MDRD: >90 ML/MIN/1.73M2
GLUCOSE SERPL-MCNC: 138 MG/DL (ref 70–99)
HCT VFR BLD AUTO: 46.4 % (ref 40–53)
HGB BLD-MCNC: 15 G/DL (ref 13.3–17.7)
IMM GRANULOCYTES # BLD: 0 10E3/UL
IMM GRANULOCYTES NFR BLD: 0 %
LACTATE SERPL-SCNC: 1.2 MMOL/L (ref 0.7–2)
LYMPHOCYTES # BLD AUTO: 3.3 10E3/UL (ref 0.8–5.3)
LYMPHOCYTES NFR BLD AUTO: 42 %
MCH RBC QN AUTO: 28.4 PG (ref 26.5–33)
MCHC RBC AUTO-ENTMCNC: 32.3 G/DL (ref 31.5–36.5)
MCV RBC AUTO: 88 FL (ref 78–100)
MONOCYTES # BLD AUTO: 0.8 10E3/UL (ref 0–1.3)
MONOCYTES NFR BLD AUTO: 10 %
NEUTROPHILS # BLD AUTO: 3.3 10E3/UL (ref 1.6–8.3)
NEUTROPHILS NFR BLD AUTO: 42 %
NRBC # BLD AUTO: 0 10E3/UL
NRBC BLD AUTO-RTO: 0 /100
PLATELET # BLD AUTO: 139 10E3/UL (ref 150–450)
POTASSIUM SERPL-SCNC: 3.9 MMOL/L (ref 3.4–5.3)
PROCALCITONIN SERPL IA-MCNC: 0.06 NG/ML
RBC # BLD AUTO: 5.28 10E6/UL (ref 4.4–5.9)
RSV RNA SPEC NAA+PROBE: NEGATIVE
SARS-COV-2 RNA RESP QL NAA+PROBE: NEGATIVE
SODIUM SERPL-SCNC: 137 MMOL/L (ref 136–145)
WBC # BLD AUTO: 7.9 10E3/UL (ref 4–11)

## 2023-04-06 PROCEDURE — 83605 ASSAY OF LACTIC ACID: CPT | Performed by: EMERGENCY MEDICINE

## 2023-04-06 PROCEDURE — C9803 HOPD COVID-19 SPEC COLLECT: HCPCS

## 2023-04-06 PROCEDURE — 83880 ASSAY OF NATRIURETIC PEPTIDE: CPT | Performed by: EMERGENCY MEDICINE

## 2023-04-06 PROCEDURE — 99285 EMERGENCY DEPT VISIT HI MDM: CPT | Mod: CS,25

## 2023-04-06 PROCEDURE — 80048 BASIC METABOLIC PNL TOTAL CA: CPT | Performed by: EMERGENCY MEDICINE

## 2023-04-06 PROCEDURE — 71046 X-RAY EXAM CHEST 2 VIEWS: CPT

## 2023-04-06 PROCEDURE — 87637 SARSCOV2&INF A&B&RSV AMP PRB: CPT | Performed by: EMERGENCY MEDICINE

## 2023-04-06 PROCEDURE — 93005 ELECTROCARDIOGRAM TRACING: CPT

## 2023-04-06 PROCEDURE — 84145 PROCALCITONIN (PCT): CPT | Performed by: EMERGENCY MEDICINE

## 2023-04-06 PROCEDURE — 85025 COMPLETE CBC W/AUTO DIFF WBC: CPT | Performed by: EMERGENCY MEDICINE

## 2023-04-06 PROCEDURE — 36415 COLL VENOUS BLD VENIPUNCTURE: CPT | Performed by: EMERGENCY MEDICINE

## 2023-04-06 ASSESSMENT — ACTIVITIES OF DAILY LIVING (ADL): ADLS_ACUITY_SCORE: 37

## 2023-04-06 ASSESSMENT — ENCOUNTER SYMPTOMS
MYALGIAS: 0
SHORTNESS OF BREATH: 1
COUGH: 1

## 2023-04-07 ENCOUNTER — MEDICAL CORRESPONDENCE (OUTPATIENT)
Dept: HEALTH INFORMATION MANAGEMENT | Facility: CLINIC | Age: 63
End: 2023-04-07

## 2023-04-07 VITALS
HEART RATE: 82 BPM | RESPIRATION RATE: 25 BRPM | TEMPERATURE: 97.8 F | DIASTOLIC BLOOD PRESSURE: 74 MMHG | BODY MASS INDEX: 23.73 KG/M2 | OXYGEN SATURATION: 96 % | WEIGHT: 175 LBS | SYSTOLIC BLOOD PRESSURE: 104 MMHG

## 2023-04-07 LAB
ATRIAL RATE - MUSE: 97 BPM
DIASTOLIC BLOOD PRESSURE - MUSE: NORMAL MMHG
INTERPRETATION ECG - MUSE: NORMAL
NT-PROBNP SERPL-MCNC: 63 PG/ML (ref 0–900)
P AXIS - MUSE: 51 DEGREES
PR INTERVAL - MUSE: 160 MS
QRS DURATION - MUSE: 92 MS
QT - MUSE: 368 MS
QTC - MUSE: 467 MS
R AXIS - MUSE: -58 DEGREES
SYSTOLIC BLOOD PRESSURE - MUSE: NORMAL MMHG
T AXIS - MUSE: 49 DEGREES
VENTRICULAR RATE- MUSE: 97 BPM

## 2023-04-07 PROCEDURE — 250N000013 HC RX MED GY IP 250 OP 250 PS 637: Performed by: EMERGENCY MEDICINE

## 2023-04-07 RX ORDER — CARBAMAZEPINE 100 MG/5ML
150 SUSPENSION ORAL ONCE
Status: COMPLETED | OUTPATIENT
Start: 2023-04-07 | End: 2023-04-07

## 2023-04-07 RX ADMIN — BRIVARACETAM 100 MG: 10 SOLUTION ORAL at 00:42

## 2023-04-07 RX ADMIN — CARBAMAZEPINE 150 MG: 100 SUSPENSION ORAL at 00:42

## 2023-04-07 ASSESSMENT — ACTIVITIES OF DAILY LIVING (ADL): ADLS_ACUITY_SCORE: 37

## 2023-04-07 NOTE — ED PROVIDER NOTES
History     Chief Complaint:  Cough        HPI   Keyon Farias is a 62 year old male with a history of TBI, stroke, and right hypopituitarism who presents with cough and shortness of breath. Per EMS, Keyon lives at home with his mother who is his caregiver. She reports that Keyon has recently been experiencing increased productive cough and shortness of breath. She also reports a history of recurring pneumonia. En route to the ED, EMS note that Keyon had a saturation of 91%. During evaluation, Keyon denied that he was in any pain. Of note, Keyon's old tracheostomy wound site has healed over and he has a PEG tube in place.       Independent Historian:   Mother and EMS report as above    Review of External Notes:      ROS:  Review of Systems   Unable to perform ROS: Patient nonverbal   Respiratory: Positive for cough and shortness of breath.    Musculoskeletal: Negative for myalgias.       Allergies:  Valproic Acid  Phenytoin Sodium  Scopolamine     Medications:    Pantoprazole   Sodium bicarbonate   Albuterol   Carbamazepine   Bacitracin   Calcium carbonate   Glycopyrrolate   Levothyroxine   Metoclopramide   Depotestosterone     Past Medical History:    TBI  Stroke  Epilepsy  Necrotizing pancreatitis  Thrombocytopenia  Leukocytosis   Esophagitis   Hyperlipidemia  Bladder calculus   Right hemiparesis   Panhypopituitarism   Atelectasis   Vitamin D deficiency   Asthma  Acid peptic disease  Aphasia  DVT of upper extremity  GERD  Thyroid disease  PEG tube in place     Past Surgical History:    Gastrojejunostomy with jejunostomy placement  GJ tube change x19  Appendectomy  Dental extractions and restoration  Contracture release, x2  Upper GI endoscopic ultrasound  EGD x4  Reconstructive facial surgery  PICC line exchange  Appendectomy   Right hand repair   Tracheostomy x2  Unspecified vascular surgery     Family History:    Kidney cancer - father  PE - mother  Hypertension - father    Social History:  Patient arrived via  EMS.  Patient is unaccompanied in the ED.  Patient has history of tobacco use.  PCP: Elsa Queen     Physical Exam     Patient Vitals for the past 24 hrs:   BP Temp Temp src Pulse Resp SpO2 Weight   04/07/23 0030 104/74 -- -- 82 -- 95 % --   04/06/23 2216 -- -- -- 94 25 93 % --   04/06/23 2205 105/62 97.8  F (36.6  C) Tympanic -- -- -- --   04/06/23 2203 -- -- -- 103 22 94 % 79.4 kg (175 lb)        Physical Exam  General/Appearance: appears stated age, well-groomed, appears comfortable  Eyes: EOMI, no scleral injection, no icterus  ENT: dry oral mucosa  Neck: supple, nl ROM, no stiffness  Cardiovascular: RRR, nl S1S2, no m/r/g, 2+ pulses in all 4 extremities, cap refill <2sec  Respiratory: no increased WOB, no retractions. Diffuse rhonchi  GI: abd soft, non-distended, nttp,  no HSM, no rebound, no guarding, nl BS  MSK: KARIMI, good tone, no bony abnormality  Skin: warm and well-perfused, no rash, no edema, no ecchymosis, nl turgor  Neuro: non-verbal but communicated with up and down thumbs  Psych: deferred  Heme: no petechia, no purpura, no active bleeding        Emergency Department Course   ECG  ECG taken at 2203, ECG read at 2316  Normal sinus rhythm  Left anterior fascicular block  Abnormal ECG   Rate 97 bpm. WA interval 160 ms. QRS duration 92 ms. QT/QTc 368/467 ms. P-R-T axes 51 -58 49.     Imaging:  XR Chest 2 Views   Final Result   IMPRESSION: Chronic elevation of the right hemidiaphragm. Mild bibasilar atelectasis. Normal heart size and pulmonary vascularity. No pleural fluid or pneumothorax.         Report per radiology    Laboratory:  Labs Ordered and Resulted from Time of ED Arrival to Time of ED Departure   BASIC METABOLIC PANEL - Abnormal       Result Value    Sodium 137      Potassium 3.9      Chloride 99      Carbon Dioxide (CO2) 29      Anion Gap 9      Urea Nitrogen 19.7      Creatinine 0.92      Calcium 8.6 (*)     Glucose 138 (*)     GFR Estimate >90     PROCALCITONIN - Abnormal    Procalcitonin  0.06 (*)    CBC WITH PLATELETS AND DIFFERENTIAL - Abnormal    WBC Count 7.9      RBC Count 5.28      Hemoglobin 15.0      Hematocrit 46.4      MCV 88      MCH 28.4      MCHC 32.3      RDW 13.6      Platelet Count 139 (*)     % Neutrophils 42      % Lymphocytes 42      % Monocytes 10      % Eosinophils 5      % Basophils 1      % Immature Granulocytes 0      NRBCs per 100 WBC 0      Absolute Neutrophils 3.3      Absolute Lymphocytes 3.3      Absolute Monocytes 0.8      Absolute Eosinophils 0.4      Absolute Basophils 0.1      Absolute Immature Granulocytes 0.0      Absolute NRBCs 0.0     INFLUENZA A/B, RSV, & SARS-COV2 PCR - Normal    Influenza A PCR Negative      Influenza B PCR Negative      RSV PCR Negative      SARS CoV2 PCR Negative     LACTIC ACID WHOLE BLOOD - Normal    Lactic Acid 1.2     NT PROBNP INPATIENT - Normal    N terminal Pro BNP Inpatient 63          Procedures   none    Emergency Department Course & Assessments:       Interventions:  Medications   Brivaracetam (BRIVIACT) solution 100 mg (100 mg Oral $Given 4/7/23 0042)   carBAMazepine (TEGretol) suspension 150 mg (150 mg Oral $Given 4/7/23 0042)        Assessments:  2302 I obtained history and examined the patient as noted above.   0025 I rechecked and updated the patient. At this time, the patient was deemed safe to discharge home and he agreed to the plan of care.    Independent Interpretation (X-rays, CTs, rhythm strip):  None    Consultations/Discussion of Management or Tests:  None        Social Determinants of Health affecting care:   None    Disposition:  The patient was discharged to home.     Impression & Plan    CMS Diagnoses: None      Medical Decision Making:  This patient is a 62-year-old male with history of TBI and stroke who was only able to communicate with using his thumbs up or thumbs down who presents today with cough and shortness of breath.  Does have some diffuse rhonchi and I was concerning for pneumonia however his  procalcitonin is relatively negative, he has a negative white count, is afebrile, and chest x-ray is unremarkable.  COVID and influenza are negative.  Clinically he looks well.  I even considered an atypical presentation of congestive heart failure or ACS however his BNP is negative and EKG is unremarkable.  At this point in time he continues to feel well and from what I can tell feels comfortable with discharge home.    Diagnosis:    ICD-10-CM    1. Acute cough  R05.1            Discharge Medications:  New Prescriptions    No medications on file          Scribe Disclosure:  I, Kala Hawkins, am serving as a scribe at 11:15 PM on 4/6/2023 to document services personally performed by Kristi Carrizales MD based on my observations and the provider's statements to me.     4/6/2023   Kristi Carrizales MD Mahoney, Katherine Collins, MD  04/07/23 0049

## 2023-04-13 ENCOUNTER — MEDICAL CORRESPONDENCE (OUTPATIENT)
Dept: HEALTH INFORMATION MANAGEMENT | Facility: CLINIC | Age: 63
End: 2023-04-13

## 2023-04-14 NOTE — PROGRESS NOTES
This is a recent snapshot of the patient's [facility] medical record.  For current drug dose and complete information and questions, call [phone] or In Basket pool, [facility] home infusion ([id number])  CSN Number:  [id number]

## 2023-04-19 NOTE — TELEPHONE ENCOUNTER
Pt's mother Savannah called requesting these 2 Rx's. Informed they were both approved to [facility] pharmacy in [place] on [address] and she can call them to get the neb solutions filled. Pt agreed with this plan.

## 2023-04-26 ENCOUNTER — TELEPHONE (OUTPATIENT)
Dept: FAMILY MEDICINE | Facility: CLINIC | Age: 63
End: 2023-04-26
Payer: MEDICARE

## 2023-04-26 DIAGNOSIS — J69.0 ASPIRATION PNEUMONITIS (H): ICD-10-CM

## 2023-04-26 DIAGNOSIS — K21.9 GASTROESOPHAGEAL REFLUX DISEASE, UNSPECIFIED WHETHER ESOPHAGITIS PRESENT: ICD-10-CM

## 2023-04-26 DIAGNOSIS — E23.0 PANHYPOPITUITARISM (H): ICD-10-CM

## 2023-04-26 DIAGNOSIS — E29.1 HYPOGONADISM MALE: ICD-10-CM

## 2023-04-26 RX ORDER — GUAIFENESIN 600 MG/1
600 TABLET, EXTENDED RELEASE ORAL 2 TIMES DAILY PRN
Qty: 60 TABLET | Refills: 0 | Status: SHIPPED | OUTPATIENT
Start: 2023-04-26

## 2023-04-26 RX ORDER — ALBUTEROL SULFATE 5 MG/ML
2.5 SOLUTION RESPIRATORY (INHALATION) EVERY 6 HOURS PRN
Qty: 240 ML | Refills: 0 | Status: SHIPPED | OUTPATIENT
Start: 2023-04-26 | End: 2024-01-11

## 2023-04-26 NOTE — TELEPHONE ENCOUNTER
Dr Pope covering for Dr Queen as she is out of office  I have approved refills on the previously managed PCP meds that were requested, however I did not refill phos nak as I didn't see this on the list and am not sure of the indication. I will leave for PCP to review tomorrow when she is back in office.

## 2023-04-26 NOTE — TELEPHONE ENCOUNTER
Patient's mother is looking for refill on his Phos-nak one package three  times per day.     She is certain this was prescribed by another provider, wondering if PCP can approve.     Other refills are also pended .     Pharmacy at [facility] on [address].       Yvette Barraza ,RN  -[facility]

## 2023-04-27 RX ORDER — CALCIUM CARBONATE 1250 MG/5ML
SUSPENSION ORAL
Qty: 473 ML | Refills: 3 | Status: CANCELLED | OUTPATIENT
Start: 2023-04-27

## 2023-04-27 RX ORDER — TESTOSTERONE CYPIONATE 200 MG/ML
INJECTION, SOLUTION INTRAMUSCULAR
Qty: 4 ML | Refills: 3 | Status: CANCELLED | OUTPATIENT
Start: 2023-04-27

## 2023-04-27 NOTE — TELEPHONE ENCOUNTER
Called patients guardian. She states he is not sick nor has been. States she wanted to get him eating solid foods again.     Writer advised against starting solid foods without ok from PCP and OT involved. (Noted he has been in ED on 04/06/23 with acute cough and sob).    Guardian states that she just ran out of the Phos-nak that was previously ordered from another provider. Again, states not sure why he was on it.     Writer advised an appointment to discuss new medication management/general check up with PCP. Guardian agreed.     Appointments in Next Year    May 15, 2023 10:30 AM  (Arrive by 10:10 AM)  Provider Visit with Elsa Queen MD  [facility] ([facility] ) [phone]   Dec 13, 2023  1:00 PM  (Arrive by 12:45 PM)  RETURN ENDOCRINE with Faizan Mclean MD  [facility] [place] ([facility] ) [phone]        Kamryn Murcia RN on 4/27/2023 at 2:15 PM

## 2023-04-27 NOTE — TELEPHONE ENCOUNTER
TO PCP  MIKEY      Called guardian back. Rescheduled.  Appointments in Next Year    May 03, 2023 11:30 AM  (Arrive by 11:10 AM)  Provider Visit with Elsa Queen MD  [facility] ([facility] ) [phone]   Dec 13, 2023  1:00 PM  (Arrive by 12:45 PM)  RETURN ENDOCRINE with Faizan Mclean MD  [facility] [place] ([facility] ) [phone]        Kamryn Murcia RN on 4/27/2023 at 2:21 PM

## 2023-04-27 NOTE — TELEPHONE ENCOUNTER
Called and spoke to patient in regards of message below from PCP. Patient's guardian does not remember why medication was prescribed for as she states its been a long time since he took it. Patient's guardian stated that pt has been sick for couple weeks and is unable to eat properly.  Please advise.      Aislinn LUJAN MA on 4/27/2023 at 11:47 AM

## 2023-05-01 ENCOUNTER — NURSE TRIAGE (OUTPATIENT)
Dept: FAMILY MEDICINE | Facility: CLINIC | Age: 63
End: 2023-05-01
Payer: MEDICARE

## 2023-05-01 ENCOUNTER — HOSPITAL ENCOUNTER (OUTPATIENT)
Facility: CLINIC | Age: 63
Discharge: HOME | End: 2023-05-01
Admitting: RADIOLOGY
Payer: MEDICARE

## 2023-05-01 ENCOUNTER — APPOINTMENT (OUTPATIENT)
Dept: INTERVENTIONAL RADIOLOGY/VASCULAR | Facility: CLINIC | Age: 63
End: 2023-05-01
Attending: PHYSICIAN ASSISTANT
Payer: MEDICARE

## 2023-05-01 VITALS
HEART RATE: 60 BPM | OXYGEN SATURATION: 96 % | DIASTOLIC BLOOD PRESSURE: 65 MMHG | SYSTOLIC BLOOD PRESSURE: 131 MMHG | TEMPERATURE: 97.8 F

## 2023-05-01 DIAGNOSIS — S06.9X9S TRAUMATIC BRAIN INJURY WITH LOSS OF CONSCIOUSNESS, SEQUELA (H): ICD-10-CM

## 2023-05-01 DIAGNOSIS — S06.9X9S TRAUMATIC BRAIN INJURY WITH LOSS OF CONSCIOUSNESS, SEQUELA (H): Primary | ICD-10-CM

## 2023-05-01 PROCEDURE — 49452 REPLACE G-J TUBE PERC: CPT

## 2023-05-01 PROCEDURE — 999N000163 HC STATISTIC SIMPLE TUBE INSERTION/CHARGE, PORT, CATH, FISTULOGRAM

## 2023-05-01 PROCEDURE — 272N000116 HC CATH CR1

## 2023-05-01 PROCEDURE — C1769 GUIDE WIRE: HCPCS

## 2023-05-01 PROCEDURE — 250N000009 HC RX 250: Performed by: RADIOLOGY

## 2023-05-01 RX ORDER — LIDOCAINE HYDROCHLORIDE 20 MG/ML
JELLY TOPICAL ONCE
Status: COMPLETED | OUTPATIENT
Start: 2023-05-01 | End: 2023-05-01

## 2023-05-01 RX ADMIN — LIDOCAINE HYDROCHLORIDE: 20 JELLY TOPICAL at 15:05

## 2023-05-01 ASSESSMENT — ACTIVITIES OF DAILY LIVING (ADL): ADLS_ACUITY_SCORE: 37

## 2023-05-01 NOTE — PROGRESS NOTES
Care Suites Admission Nursing Note    Patient Information  Name: Keyon Farias  Age: 62 year old  Reason for admission: G tube  Care Suites arrival time: 1425    Visitor Information  Name: Savannah  Informed of visitor restrictions: Yes  2 visitor allowed per patient   Visitor must wear a mask    Patient Admission/Assessment   Pre-procedure assessment complete: Yes  If abnormal assessment/labs, provider notified: N/A  NPO: Yes  Medications held per instructions/orders: N/A  Consent: obtained  If applicable, pregnancy test status: deferred  Patient oriented to room: and mother  Education/questions answered: Yes to mother  Discharge Planning  Discharge name/phone number: [phone]   Overnight post sedation caregiver: Savannah  Discharge location: home    Caity Pedraza RN

## 2023-05-01 NOTE — DISCHARGE INSTRUCTIONS
G-tube Exchange Discharge Instructions     After you go home:    You may resume your normal diet    Care of Insertion Site:    For the first 48 hrs, check your puncture site every couple hours while you are awake   You may remove/change the dressing tomorrow  You may shower tomorrow  No tub baths, whirlpools or swimming until your puncture site has fully healed     Activity     You may go back to normal activity in 24 hours  Wait 48 hours before lifting, straining, exercise or other strenuous activity    Medicines:    You may resume all medications  Resume your Warfarin/Coumadin at your regular dose today. Follow up with your provider to have your INR rechecked  Resume your Platelet Inhibitors and Aspirin tomorrow at your regular dose  For minor pain, you may take Acetaminophen (Tylenol) or Ibuprofen (Advil)                 Call the provider who ordered this procedure if:    Blood or fluid is draining from the site  The site is red, swollen, hot, tender or there is foul-smelling drainage  Chills or a fever greater than 101 F (38 C)  Increased pain at the site  Any questions or concerns    Call  911 or go to the Emergency Room if:    Severe pain or trouble breathing  Bleeding that you cannot control      If you have questions call:          [facility]iology Dept @ [phone]    Caring for your G-tube    Tube Maintenance:    For ENFit Tubes:    Do NOT over tighten the connections (the purple end & hub connection).    Clean the connecting ends (especially the hub) every day.    If you are unable to disconnect the tubing ends, soak the connection in warm water (or wrap in a wet warm washcloth) to try and loosen the connection.    Possible problems with your tube may include:    Clogged with medications or feedings - most obstructions can be cleared with a small (3cc) syringe and warm water. This may be repeated until the tube is unclogged. This can be prevented by frequently flushing the tube with water  (60cc) during the day and always after medications & feedings.    Tube pulls out or falls out -cover the opening with gauze & tape. Call [phone] for further instructions    Skin breakdown and/or yeast infections at the insertion site - use of skin barrier ointments and anti-fungal powders can treat most site irritations.  Ask your pharmacist or provider for assistance (a prescription is not necessary).    In general, tube problems (including pulled tubes) are NOT emergency situations. Unless the pulling out of a tube is accompanied by uncontrollable bleeding, please DO NOT GO TO THE EMERGENCY ROOM!  Call [phone] with problems.    Tube Care:    Change the gauze dressing every 24 hours and if soiled (dirty).  Stabilize all tubes securely by using gauze and tape.  Clean tube site with soap & water using a cotton applicator (Q-tip) as needed to prevent irritation.  Flush feeding tube with 60cc of warm or room temperature water before and after meds.  To prevent the tube from clogging, ask your provider or pharmacist if liquid forms of your medications are available. If not, crush the pills well & be sure to flush the tube before & after all medications.  Flush feeding tube a minimum of every 4 hours and when feeding is completed with 60cc of water to keep the tube clear and avoid clogging.  Pt may use an abdominal (waist) binder to protect the G-tube.    If there is continued oozing or bleeding, redness, yellow/green/foul smelling drainage    STOP the feedings & use of the tube immediately if there is:    Continued oozing or bleeding at the site  Redness  Yellow/green or foul smelling drainage at the site  Uncontrolled stomach pain    Many of the supplies mentioned above can be purchased at your local pharmacy      For issues with your tube, please call:    [facility] at [phone]

## 2023-05-01 NOTE — PRE-PROCEDURE
GENERAL PRE-PROCEDURE:   Procedure:  Gastrojejunostomy tube change with locak anesthesia  Date/Time:  5/1/2023 2:32 PM    Written consent obtained?: Yes    Risks and benefits: Risks, benefits and alternatives were discussed    Consent given by:  Parent  Patient states understanding of procedure being performed: Yes    Patient's understanding of procedure matches consent: Yes    Procedure consent matches procedure scheduled: Yes    : local anesthesia only.  ASA Class:  3  Statement of review:  I have reviewed the lab findings, diagnostic data, medications, and the plan for sedation    Per Savannah pt's mother and guardian, Keyon pulled the GJ tube all the way out. She pushed it back in and gave him his morning medications, but presents now to have it exchanged to ensure appropriate positioning.

## 2023-05-01 NOTE — TELEPHONE ENCOUNTER
CC: Patient's guardian Savannah (C2C) calling on behalf of the patient stating that patient pulled his PEG tube.     Savannah is wondering what department patient had tube exchanges done, writer advised that tube was exchanged in IR department but that would be a scheduled procedure and for emergent situation of tube being pulled out patient would need to go to the emergency room.    Savannah expressed verbal understanding and is agreeable, states she is going to call [facility] first and ask for IR department but understands patient will need to go to ED if they are unable to further assist.    Signing encounter.    Stephane Velasquez, NANCI  [facility]    Reason for Disposition    Nursing judgment    Additional Information    Negative: Sounds like a life-threatening emergency to the triager    Negative: Information only call about a Well Adult (no illness or injury)    Negative: Caller can't be reached by phone    Protocols used: NO PROTOCOL [id number]

## 2023-05-01 NOTE — PROGRESS NOTES
Care Suites Discharge Nursing Note    Patient Information  Name: Keyon Farias  Age: 62 year old    Discharge Education:  Discharge instructions reviewed: Yes  Additional education/resources provided: n/a  Patient/patient representative verbalizes understanding: Yes  Patient discharging on new medications: No  Medication education completed: N/A    Discharge Plans:   Discharge location: home  Discharge ride contacted: Yes  Approximate discharge time: 1535    Discharge Criteria:  Discharge criteria met and vital signs stable: Yes    Patient Belongs:  Patient belongings returned to patient: Yes    Zara Snell RN

## 2023-05-01 NOTE — PROGRESS NOTES
patient Name:  Keyon Farias    Medical Record Number: [id number]  Today's Date:  May 1, 2023  Procedure: Gastrojejunostomy tube exchange with local anesthesia  Start Time: 1503  End of procedure time: 1508    Report given to: care suite  RN  Time pt departs:  1510       Notes:       Pt transferred to IR table. Prepped and draped appropriately. No complaints of pain at this time. Timeout recorded.

## 2023-05-02 NOTE — CONSULTS
CLINICAL NUTRITION SERVICES  -  ASSESSMENT NOTE      Recommendations Ordered by Registered Dietitian (RD):     Type of Feeding Tube: J-tube   Enteral Frequency:  Cyclic   Enteral Regimen: Isosource 1.5 at 100 mL/hr x 12 hours (8am - 8pm)   Total Enteral Provisions: 1800 kcal, 82 g protein (1.1 g/kg and 91% needs), 211 g CHO, 18 g fiber, 912 mL H2O   Free Water Flush: 200 mL every 4 hours + 60 mL before and after each cycle   Also receiving NutriSource Fiber 1 pkt daily = 3 g fiber, 15 kcal   Total = 1815 kcal (24 kcal/kg)   TOTAL FLUID (TF + flushes) = 2232 mL     Malnutrition:   % Weight Loss:  None noted (question accuracy of  current wt)  % Intake:  No decreased intake noted  Subcutaneous Fat Loss:  Do not anticipate any acute loss  Muscle Loss: Do not anticipate any acute loss  Fluid Retention:  None noted    Malnutrition Diagnosis: Patient does not meet two of the above criteria necessary for diagnosing malnutrition         REASON FOR ASSESSMENT  Keyon Farias is a 60 year old male assessed by Registered Dietitian for Admission Nutrition Risk Screen for tube feeding or parenteral nutrition and Provider Order - Registered Dietitian to Assess and Order TF per Medical Nutrition Therapy Protocol      NUTRITION HISTORY  Pt well known to Nutrition Services  (8/11) RD Assessment:  - Spoke to Savannah this morning to obtain enteral regimen -->                - Via JT, HOB elevated (Last replaced 6/24)               - Jevity 1.5 @ 90 - 100 ml/hr x 12 hours. Savannah notes that when she notices Keyon's stomach getting full she will turn off the TF for about 1 hour. Most days he will still get the full 12 hours of nutrition.               - flush 60 ml each time feeding is turned on or off and with meds. Savannah notes that the home care RNs typically manage the flushes so she is not sure how much he gets daily.   - Patient receives NeutraPhos TID and NeutraSource Fiber 1 pkt daily     Pt was seen 1 week ago (8/14) and was  "receiving:  Type of Feeding Tube: J-tube   Enteral Frequency:  Cyclic   Enteral Regimen: Isosource 1.5 at 100 mL/hr x 12 hours (8am - 8pm)   Total Enteral Provisions: 1800 kcal, 82 g protein (1.1 g/kg and 91% needs), 211 g CHO, 18 g fiber, 912 mL H2O   Free Water Flush: 200 mL every 4 hours + 60 mL before and after each cycle   Also receiving NutriSource Fiber 1 pkt daily = 3 g fiber, 15 kcal   Total = 1815 kcal (24 kcal/kg)         CURRENT NUTRITION ORDERS  Diet Order:     NPO    Per MD, plan to discontinue the IVF once TF at goal      NUTRITION FOCUSED PHYSICAL ASSESSMENT FOR DIAGNOSING MALNUTRITION)    No:  Deferred (isolation)                Observed:    Deferred    Obtained from Chart/Interdisciplinary Team:  Fever  GJ tube    ANTHROPOMETRICS  Height: 6'0\"  Weight:(8/21) 69.4 kg / 152 lbs 15.99 oz  Body mass index is 20.75 kg/m .  Weight Status:  Normal BMI  IBW: 80.9 kg  % IBW: 86%  Weight History: ?? Accuracy of wt today - reflects 10# wt loss past week.  Wt Readings from Last 10 Encounters:   08/21/20 69.4 kg (153 lb)   08/15/20 74.2 kg (163 lb 9.3 oz)   05/17/20 67.2 kg (148 lb 2.4 oz)   05/06/20 74.2 kg (163 lb 8 oz)   03/26/20 74.8 kg (165 lb)   02/21/20 70.4 kg (155 lb 3.3 oz)   02/12/20 72.9 kg (160 lb 11.5 oz)   01/30/20 74.8 kg (165 lb)   01/29/20 74.8 kg (165 lb)   01/02/20 71.7 kg (158 lb)         LABS  8/21: Na 133           K 4.7           Mg 2.4           Phos 2.4 (L)    MEDICATIONS  NeutraPhos - 1 packet TID  Vit C - 500 mg daily  IVF @ 100 mL/hr         ASSESSED NUTRITION NEEDS PER APPROVED PRACTICE GUIDELINES:    Dosing Weight (8/15) 74.2 kg  Estimated Energy Needs: 1728-3028 kcals (20-25 Kcal/Kg)  Justification: maintenance, WC bound  Estimated Protein Needs:  grams protein (1.2-1.5 g pro/Kg)  Justification: maintenance  Estimated Fluid Needs: 1 mL per kcal, or per MD for fluid management    MALNUTRITION:  % Weight Loss:  None noted (question accuracy of  current wt)  % Intake:  No " decreased intake noted  Subcutaneous Fat Loss:  Do not anticipate any acute loss  Muscle Loss: Do not anticipate any acute loss  Fluid Retention:  None noted    Malnutrition Diagnosis: Patient does not meet two of the above criteria necessary for diagnosing malnutrition      NUTRITION DIAGNOSIS:  No nutrition diagnosis identified at this time       NUTRITION INTERVENTIONS  Recommendations / Nutrition Prescription  NPO    Type of Feeding Tube: J-tube   Enteral Frequency:  Cyclic   Enteral Regimen: Isosource 1.5 at 100 mL/hr x 12 hours (8am - 8pm)   Total Enteral Provisions: 1800 kcal, 82 g protein (1.1 g/kg and 91% needs), 211 g CHO, 18 g fiber, 912 mL H2O   Free Water Flush: 200 mL every 4 hours + 60 mL before and after each cycle   Also receiving NutriSource Fiber 1 pkt daily = 3 g fiber, 15 kcal   Total = 1815 kcal (24 kcal/kg)   TOTAL FLUID (TF + flushes) = 2232 mL  .      Implementation  Nutrition education: No education needs assessed at this time  EN Composition and EN Schedule: TF orders entered in EPIC  .      Nutrition Goals  EN to meet % est needs  .      MONITORING AND EVALUATION:  Progress towards goals will be monitored and evaluated per protocol and Practice Guidelines                 Normal for race

## 2023-05-09 ENCOUNTER — TELEPHONE (OUTPATIENT)
Dept: FAMILY MEDICINE | Facility: CLINIC | Age: 63
End: 2023-05-09

## 2023-05-09 NOTE — TELEPHONE ENCOUNTER
Patient on my schedule today for a pre-op.    With his very complicated history and I don't feel comfortable clearing him for procedure.  He would really benefit from having have pre-op evaluation at the [facility] pre-op clinic.    Please call his guardian/mother and give her the # to reschedule there:[phone]    Thanks.

## 2023-05-09 NOTE — TELEPHONE ENCOUNTER
Huddled with Lou on this.  Checking with Dr. Queen first, if we can reschedule pt to one of Dr. Queen's spots tomorrow, as Dr. Queen is primary.

## 2023-05-09 NOTE — TELEPHONE ENCOUNTER
Scheduled pt for preop tomorrow with Dr. Queen.    Team - Could someone reach out to pt's mother.  She is having difficulty figuring out what pre-op paperwork she needs to get from [facility], and bring in.  She might need help getting this paperwork, so everything can go smoothly tomorrow.    Raissa Recio, RN  [facility] RN Triage Team

## 2023-05-09 NOTE — TELEPHONE ENCOUNTER
I can see him for preop tomorrow.   Please use 2 patient slots for this appointment.   2:30 and 3 are available

## 2023-05-09 NOTE — TELEPHONE ENCOUNTER
Spoke w pt's mom, Savannah - states paperwork will be mailed to her around next week. Advised pt's mom to provide us w procedure name, surgeon, and phone number or fax number to send over notes at tmw's visit. Savannah provided name of surgeon Dr Lockett for dental procedure and phone number for more information [phone].  Aracelis Bundy, CMA

## 2023-05-10 ENCOUNTER — OFFICE VISIT (OUTPATIENT)
Dept: FAMILY MEDICINE | Facility: CLINIC | Age: 63
End: 2023-05-10
Payer: MEDICARE

## 2023-05-10 VITALS
RESPIRATION RATE: 16 BRPM | OXYGEN SATURATION: 97 % | HEIGHT: 72 IN | DIASTOLIC BLOOD PRESSURE: 59 MMHG | HEART RATE: 66 BPM | BODY MASS INDEX: 21.67 KG/M2 | SYSTOLIC BLOOD PRESSURE: 95 MMHG | TEMPERATURE: 97.6 F | WEIGHT: 160 LBS

## 2023-05-10 DIAGNOSIS — R68.89 EXCESSIVE ORAL SECRETIONS: ICD-10-CM

## 2023-05-10 DIAGNOSIS — E03.9 HYPOTHYROIDISM, UNSPECIFIED TYPE: ICD-10-CM

## 2023-05-10 DIAGNOSIS — Z01.818 PREOPERATIVE EXAMINATION: Primary | ICD-10-CM

## 2023-05-10 DIAGNOSIS — Z93.1 G TUBE FEEDINGS (H): ICD-10-CM

## 2023-05-10 DIAGNOSIS — H10.023 PINK EYE DISEASE OF BOTH EYES: ICD-10-CM

## 2023-05-10 DIAGNOSIS — E23.0 PANHYPOPITUITARISM (H): ICD-10-CM

## 2023-05-10 DIAGNOSIS — E29.1 HYPOGONADISM MALE: ICD-10-CM

## 2023-05-10 DIAGNOSIS — E44.1 MILD PROTEIN-CALORIE MALNUTRITION (H): ICD-10-CM

## 2023-05-10 PROBLEM — E46 PROTEIN-CALORIE MALNUTRITION (H): Status: ACTIVE | Noted: 2023-05-10

## 2023-05-10 LAB
ANION GAP SERPL CALCULATED.3IONS-SCNC: 13 MMOL/L (ref 7–15)
BUN SERPL-MCNC: 20.6 MG/DL (ref 8–23)
CALCIUM SERPL-MCNC: 8.6 MG/DL (ref 8.8–10.2)
CHLORIDE SERPL-SCNC: 98 MMOL/L (ref 98–107)
CREAT SERPL-MCNC: 0.89 MG/DL (ref 0.67–1.17)
DEPRECATED HCO3 PLAS-SCNC: 26 MMOL/L (ref 22–29)
GFR SERPL CREATININE-BSD FRML MDRD: >90 ML/MIN/1.73M2
GLUCOSE SERPL-MCNC: 85 MG/DL (ref 70–99)
PHOSPHATE SERPL-MCNC: 3 MG/DL (ref 2.5–4.5)
POTASSIUM SERPL-SCNC: 4.2 MMOL/L (ref 3.4–5.3)
SODIUM SERPL-SCNC: 137 MMOL/L (ref 136–145)
T4 FREE SERPL-MCNC: 1.49 NG/DL (ref 0.9–1.7)
TSH SERPL DL<=0.005 MIU/L-ACNC: <0.01 UIU/ML (ref 0.3–4.2)

## 2023-05-10 PROCEDURE — 84439 ASSAY OF FREE THYROXINE: CPT | Performed by: INTERNAL MEDICINE

## 2023-05-10 PROCEDURE — 84403 ASSAY OF TOTAL TESTOSTERONE: CPT | Performed by: INTERNAL MEDICINE

## 2023-05-10 PROCEDURE — 84443 ASSAY THYROID STIM HORMONE: CPT | Performed by: INTERNAL MEDICINE

## 2023-05-10 PROCEDURE — 99214 OFFICE O/P EST MOD 30 MIN: CPT | Performed by: INTERNAL MEDICINE

## 2023-05-10 PROCEDURE — 80048 BASIC METABOLIC PNL TOTAL CA: CPT | Performed by: INTERNAL MEDICINE

## 2023-05-10 PROCEDURE — 84100 ASSAY OF PHOSPHORUS: CPT | Performed by: INTERNAL MEDICINE

## 2023-05-10 PROCEDURE — 36415 COLL VENOUS BLD VENIPUNCTURE: CPT | Performed by: INTERNAL MEDICINE

## 2023-05-10 RX ORDER — AZELASTINE HYDROCHLORIDE 0.5 MG/ML
SOLUTION/ DROPS OPHTHALMIC
Qty: 6 ML | Refills: 3 | Status: SHIPPED | OUTPATIENT
Start: 2023-05-10 | End: 2023-10-25

## 2023-05-10 RX ORDER — NEOMYCIN POLYMYXIN B SULFATES AND DEXAMETHASONE 3.5; 10000; 1 MG/ML; [USP'U]/ML; MG/ML
SUSPENSION/ DROPS OPHTHALMIC
Qty: 5 ML | Refills: 1 | Status: ON HOLD | OUTPATIENT
Start: 2023-05-10 | End: 2023-09-12

## 2023-05-10 ASSESSMENT — ENCOUNTER SYMPTOMS
CHILLS: 0
FATIGUE: 0
FEVER: 0
LIGHT-HEADEDNESS: 0
SHORTNESS OF BREATH: 0
DIZZINESS: 0
PALPITATIONS: 0
CHEST TIGHTNESS: 0

## 2023-05-10 ASSESSMENT — PAIN SCALES - GENERAL: PAINLEVEL: NO PAIN (0)
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
## 2023-05-10 NOTE — PATIENT INSTRUCTIONS
Avoid aspirin 7 days before the surgery. Avoid nonsteroidal anti-inflammatory pain medication like ibuprofen, Motrin, or Aleve 7 days before the surgery.  Tylenol can be used for pain.  Avoid any over the counter multivitamins or herbal supplement 7 days before surgery   You can resume these medications after surgery

## 2023-05-10 NOTE — PROGRESS NOTES
{PROVIDER CHARTING PREFERENCE:504076}    Subjective   Keyon is a 62 year old, presenting for the following health issues:  No chief complaint on file.    HPI     {SUPERLIST (Optional):043377}  {additonal problems for provider to add (Optional):827801}      Review of Systems   {ROS COMP (Optional):658088}      Objective    There were no vitals taken for this visit.  There is no height or weight on file to calculate BMI.  Physical Exam   {Exam List (Optional):645288}    {Diagnostic Test Results (Optional):570050}    {AMBULATORY ATTESTATION (Optional):500858}

## 2023-05-11 ENCOUNTER — TELEPHONE (OUTPATIENT)
Dept: ENDOCRINOLOGY | Facility: CLINIC | Age: 63
End: 2023-05-11
Payer: MEDICARE

## 2023-05-11 PROBLEM — E29.1 HYPOGONADISM MALE: Status: ACTIVE | Noted: 2023-05-11

## 2023-05-11 RX ORDER — LEVOTHYROXINE SODIUM 125 UG/1
125 TABLET ORAL DAILY
Qty: 90 TABLET | Refills: 0 | Status: ON HOLD | OUTPATIENT
Start: 2023-05-11 | End: 2023-08-08

## 2023-05-11 NOTE — TELEPHONE ENCOUNTER
LVM with review and recommendation from Dr Mclean. Clinic number for questions.   Etta Curry, RN on 5/11/2023 at 2:36 PM             Message  Received: Today  Faizan Mclean MD  [facility] Triage-  Team, Please inform guardian normal thyroid blood work result.  Continue current levothyroxine therapy. Levothyroxine should be taken on empty stomach and wait at least 30 minutes before eating. Don't take supplements or multivitamins containing iron and calcium within 4 hours of taking levothyroxine tablet.   Faizan Mclean MD

## 2023-05-11 NOTE — RESULT ENCOUNTER NOTE
Team, Please inform guardian normal thyroid blood work result.  Continue current levothyroxine therapy. Levothyroxine should be taken on empty stomach and wait at least 30 minutes before eating. Don't take supplements or multivitamins containing iron and calcium within 4 hours of taking levothyroxine tablet.   Faizan Mclean MD

## 2023-05-13 LAB — TESTOST SERPL-MCNC: 956 NG/DL (ref 240–950)

## 2023-05-15 DIAGNOSIS — E23.0 PANHYPOPITUITARISM (H): ICD-10-CM

## 2023-05-15 DIAGNOSIS — E29.1 HYPOGONADISM MALE: ICD-10-CM

## 2023-05-15 RX ORDER — TESTOSTERONE CYPIONATE 200 MG/ML
50 INJECTION, SOLUTION INTRAMUSCULAR WEEKLY
Qty: 4 ML | Refills: 3 | Status: SHIPPED
Start: 2023-05-15 | End: 2023-06-14

## 2023-05-15 NOTE — RESULT ENCOUNTER NOTE
Team,     Please inform guardian testosterone is higher than desired. Please reduce injection amount as follows. Don't administer more than indicated.   testosterone cypionate (DEPOTESTOSTERONE) 200 MG/ML injection Sig - Route: Inject 0.25 mLs (50 mg) into the muscle once a week - Intramuscular      Please inform guardian normal thyroid blood work result.  Continue current levothyroxine therapy. Levothyroxine should be taken on empty stomach and wait at least 30 minutes before eating. Don't take supplements or multivitamins containing iron and calcium within 4 hours of taking levothyroxine tablet.   Faizan Mclean MD

## 2023-05-18 ENCOUNTER — TELEPHONE (OUTPATIENT)
Dept: FAMILY MEDICINE | Facility: CLINIC | Age: 63
End: 2023-05-18
Payer: MEDICARE

## 2023-05-18 DIAGNOSIS — G82.20 PARAPLEGIA (H): ICD-10-CM

## 2023-05-18 DIAGNOSIS — R13.10 DYSPHAGIA, UNSPECIFIED TYPE: ICD-10-CM

## 2023-05-18 DIAGNOSIS — S06.9X9S TRAUMATIC BRAIN INJURY WITH LOSS OF CONSCIOUSNESS, SEQUELA (H): Primary | ICD-10-CM

## 2023-05-18 DIAGNOSIS — Z93.1 G TUBE FEEDINGS (H): ICD-10-CM

## 2023-05-18 NOTE — TELEPHONE ENCOUNTER
Patients insurance changed and he can't get including nutrition supplement for [facility]. Wife wants nutritional supplement orders sent to [facility].  Triage advised she have [facility] fax order with supplies needed for PCP to approve. She agreed to call [facility] fax listing on items needed to clinic and [facility] medical.

## 2023-05-19 NOTE — TELEPHONE ENCOUNTER
TO PCP:     Pt's guardian/mother called     Needing DME orders refilled. Was using [facility] but they sold company, they are no loner doing Medicare/Medicaid - so pt needs to switch to [facility] instead.     She had [employer] [facility] fax everything over to [facility], and they received what was needed, but they said they can't fax the clinic orders, we need to send DME orders to them.     Requestin) Jebity - tube feeding formula, 5.5 bottles per day (each are 8 oz)     2) Medium briefs #3 per day     3) large Chucks pads #3 per day     4) Also needs Noel Gastric Bags and leg bag and bed bag, reuses these and uses about 1 per month     Fax for [facility]: [phone]    Brooke GOMEZ Triage RN  [facility]

## 2023-05-23 ENCOUNTER — TELEPHONE (OUTPATIENT)
Dept: FAMILY MEDICINE | Facility: CLINIC | Age: 63
End: 2023-05-23
Payer: MEDICARE

## 2023-05-23 NOTE — TELEPHONE ENCOUNTER
"Buck from [facility] calling stating:    \"I received prescription for this patient and I need chart notes for the tube feeding formula and the long gastric bags.\"    Please fax to #[phone].    Please call Buck when they have been faxed.       Kamryn Murcia RN on 5/23/2023 at 12:08 PM      "

## 2023-05-23 NOTE — TELEPHONE ENCOUNTER
Contact - [facility]    Attempt # 1    Was call answered?  No.  Left message on voicemail with information to call triage back.    On callback - please clarify exactly what notes are needed by medical supply company    Please also see telephone encounter on 5/18/23    Stephane Velasquez RN  [facility]

## 2023-05-24 NOTE — TELEPHONE ENCOUNTER
Keyon's mother gives him tube feedings, can you please ask her, during the visit I did not discuss these details.

## 2023-05-24 NOTE — TELEPHONE ENCOUNTER
Buck, [facility] [employer] [phone]    called back requesting addendum be added to last office visit notes indicating reason for gastric bag and g tube feeding rate and how it is being administered: bolus, gravity, pump?  Buck transferred to [facility] in customer service.   Reason for long bag: food causes gas?     Please fax office visit notes with addendum to: #[phone]

## 2023-05-25 NOTE — TELEPHONE ENCOUNTER
Writer called and spoke with Savannah - patient's mother/guardian and clarified below details.     Savannah states the G - tube feeding rate 75 mL/hour and is administered via pump    Routing to Dr. Queen to please addended office visit notes requested by [facility] - please route back to triage to f/u     Triage please fax office visit notes with addendum to: #[phone]    Stephane Velasquez RN  [facility]

## 2023-05-25 NOTE — TELEPHONE ENCOUNTER
Writer printed and faxed office visit notes with addendum from 5/10/23 to [facility] at #[phone].    Signing encounter.    Stephane Velasquez RN  [facility]

## 2023-05-30 NOTE — PROVIDER NOTIFICATION
Brief update:     Patient presented with hyponatremia, had relatively rapid correction following volume resuscitation and has been on increased free water flushes (from 200 now to 400 ml q4h) as well as D5 overnight    Discussed with Dr. Barahona.  Discontinue D5 now  Hold on additional free water flushes while awaiting a.m. sodium level.  Following this result, can page for further guidance regarding resumption or potential free water dose reduction    Repeat phosphorus also pending.    Nursing updated.  Anticipated time of lab draw approximately now have added additional sodium check at noon, 1800.      Pending a.m. sodium results, Frequency and number of sodium laboratory studies may need to be adjusted.    Jerald Villavicencio MD  6:24 AM           Olumiant Counseling: I discussed with the patient the risks of Olumiant therapy including but not limited to upper respiratory tract infections, shingles, cold sores, and nausea. Live vaccines should be avoided.  This medication has been linked to serious infections; higher rate of mortality; malignancy and lymphoproliferative disorders; major adverse cardiovascular events; thrombosis; gastrointestinal perforations; neutropenia; lymphopenia; anemia; liver enzyme elevations; and lipid elevations.

## 2023-06-09 ENCOUNTER — TELEPHONE (OUTPATIENT)
Dept: FAMILY MEDICINE | Facility: CLINIC | Age: 63
End: 2023-06-09
Payer: MEDICARE

## 2023-06-09 DIAGNOSIS — J69.0 ASPIRATION PNEUMONITIS (H): Primary | ICD-10-CM

## 2023-06-09 RX ORDER — METOCLOPRAMIDE HYDROCHLORIDE 5 MG/5ML
SOLUTION ORAL
Qty: 2365 ML | Refills: 5 | Status: SHIPPED | OUTPATIENT
Start: 2023-06-09 | End: 2023-06-12

## 2023-06-09 NOTE — TELEPHONE ENCOUNTER
Pharmacy requesting clarification of what dosage/quantity patient is to take, requesting new script to be sent in.       Per pharmacy, previous orders were to take 10 mLs (10 mg) via feeding tube QID.

## 2023-06-09 NOTE — TELEPHONE ENCOUNTER
Pt's mom called     Requesting a refill on Metoclopramide HCl 5mg/5mL solution - last filled under his former PCP     Pt is out of medication - requesting LEILANI GOMEZ, Triage RN  [facility]

## 2023-06-09 NOTE — TELEPHONE ENCOUNTER
Pt's mom called     Requesting PA on testosterone. 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

## 2023-06-12 RX ORDER — METOCLOPRAMIDE HYDROCHLORIDE 5 MG/5ML
10 SOLUTION ORAL
Qty: 2365 ML | Refills: 5 | Status: SHIPPED | OUTPATIENT
Start: 2023-06-12 | End: 2024-07-29

## 2023-06-12 NOTE — TELEPHONE ENCOUNTER
Writer called patient's guardian Savannah and notified of rx sent to pharmacy by PCP. Savannah is appreciative of callback and will follow up with pharmacy.    No further questions or concerns at this time. Closing encounter.     Stephane Velasquez RN  [facility]

## 2023-06-13 ENCOUNTER — MEDICAL CORRESPONDENCE (OUTPATIENT)
Dept: HEALTH INFORMATION MANAGEMENT | Facility: CLINIC | Age: 63
End: 2023-06-13

## 2023-06-14 DIAGNOSIS — E23.0 PANHYPOPITUITARISM (H): ICD-10-CM

## 2023-06-14 DIAGNOSIS — E29.1 HYPOGONADISM MALE: ICD-10-CM

## 2023-06-14 RX ORDER — TESTOSTERONE CYPIONATE 200 MG/ML
50 INJECTION, SOLUTION INTRAMUSCULAR WEEKLY
Qty: 4 ML | Refills: 3 | Status: SHIPPED | OUTPATIENT
Start: 2023-06-14 | End: 2023-12-15

## 2023-06-14 NOTE — TELEPHONE ENCOUNTER
Prior Authorization Not Needed per Insurance    Medication:   Insurance Company: [employer] - Phone [phone] Fax [phone]  Expected CoPay:      Pharmacy Filling the Rx: [id number] - [place], [place] - [address] AT [facility] [address]  Pharmacy Notified: Yes  Patient Notified: No    Previously approved by plan, effective through 5/14/2024.     Called [facility] pharmacy [phone] listed on previous Rx (newest Rx from 5/15/23 does not have a pharmacy listed)     [facility] pharmacy does not have any active Rx's on file.  Unable to locate a new pharmacy patient is filling this at.     Unsure if patient is getting this injection administered in clinic or filling at another pharmacy.   Approval below is through pharmacy benefits.  If patient is getting this medication administered in clinic this will need a PA through medical benefits. Please contact the CAM team for any medical billing requests.     Test claim pays out as well, $0 copay please see below as well as approval letter

## 2023-07-06 ENCOUNTER — VIRTUAL VISIT (OUTPATIENT)
Dept: FAMILY MEDICINE | Facility: CLINIC | Age: 63
End: 2023-07-06
Payer: MEDICARE

## 2023-07-06 DIAGNOSIS — H10.32 ACUTE CONJUNCTIVITIS OF LEFT EYE, UNSPECIFIED ACUTE CONJUNCTIVITIS TYPE: Primary | ICD-10-CM

## 2023-07-06 PROCEDURE — 99441 PR PHYSICIAN TELEPHONE EVALUATION 5-10 MIN: CPT | Mod: 95 | Performed by: PHYSICIAN ASSISTANT

## 2023-07-06 RX ORDER — POLYMYXIN B SULFATE AND TRIMETHOPRIM 1; 10000 MG/ML; [USP'U]/ML
1-2 SOLUTION OPHTHALMIC EVERY 6 HOURS
Qty: 4 ML | Refills: 0 | Status: SHIPPED | OUTPATIENT
Start: 2023-07-06 | End: 2023-07-16

## 2023-07-06 NOTE — PROGRESS NOTES
Keyon is a 62 year old who is being evaluated via a billable telephone visit.      What phone number would you like to be contacted at? [phone]  How would you like to obtain your AVS? Mail a copy    Distant Location (provider location):  On-site    Assessment & Plan     Acute conjunctivitis of left eye, unspecified acute conjunctivitis type  Based on history presented from his guardian and history of recurrent conjunctivitis recommended treating empirically with Polytrim.  Sent to pharmacy.  Noncontact lens wearer.  Reviewed signs and symptoms that warrant reevaluation.  She is comfortable with this plan.  - trimethoprim-polymyxin b (POLYTRIM) 16786-1.1 UNIT/ML-% ophthalmic solution  Dispense: 4 mL; Refill: 0      15 minutes spent by me on the date of the encounter doing chart review, review of test results, patient visit, documentation and discussion with family        The likelihood of other entities in the differential is insufficient to justify any further testing for them at this time. This was explained to the patient. The patient was advised that persistent or worsening symptoms would require further evaluation. Patient advised to call the office and if unable to reach to go to the emergency room if they develop any new or worsening symptoms. Expressed understanding and agreement with above stated plan.     KAIDEN [facility]   Keyon is a 62 year old, presenting for the following health issues:  Conjunctivitis    Spoke with his guardian in regards to concerns for conjunctivitis.  Left eye with redness, crusting, and drainage.  Patient consistently rubbing his eye.  Denies additional upper respiratory symptoms.  No fever, chills, sinus issues, ear pain, sore throat, or cough.  Otherwise patient is acting his normal self.    Review of Systems   Constitutional, HEENT, cardiovascular, pulmonary, GI, , musculoskeletal, neuro, skin, endocrine and psych  systems are negative, except as otherwise noted.      Objective    Vitals - Patient Reported  Pain Score: No Pain (0)      Vitals:  No vitals were obtained today due to virtual visit.    Physical Exam   healthy, alert and no distress  PSYCH: Alert and oriented times 3; coherent speech, normal   rate and volume, able to articulate logical thoughts, able   to abstract reason, no tangential thoughts, no hallucinations   or delusions  His affect is normal  RESP: No cough, no audible wheezing, able to talk in full sentences  Remainder of exam unable to be completed due to telephone visits          Phone call duration: 7 minutes

## 2023-07-13 DIAGNOSIS — E23.0 PANHYPOPITUITARISM (H): ICD-10-CM

## 2023-07-13 DIAGNOSIS — E29.1 HYPOGONADISM MALE: ICD-10-CM

## 2023-07-13 RX ORDER — MUPIROCIN 20 MG/G
OINTMENT TOPICAL
Qty: 30 G | Refills: 1 | Status: SHIPPED | OUTPATIENT
Start: 2023-07-13

## 2023-07-25 ENCOUNTER — TELEPHONE (OUTPATIENT)
Dept: FAMILY MEDICINE | Facility: CLINIC | Age: 63
End: 2023-07-25
Payer: MEDICARE

## 2023-07-25 DIAGNOSIS — E23.0 PANHYPOPITUITARISM (H): Primary | ICD-10-CM

## 2023-07-25 NOTE — TELEPHONE ENCOUNTER
Patient mother, Savannah called and stated that they will no longer be going through [facility]. Savannah is needing all of his orders sent to [facility] instead. Savannah is needing the order for syringes to give patient his medication sent right away.     Liliana MADDOX RN  [facility] Triage Team

## 2023-07-26 NOTE — TELEPHONE ENCOUNTER
"Can you please verify if the syringe order is like this: \"syringe-needle 3 ml (B-D INTEGRA SYRINGE) 25g x 1\" 3 * NotApplicabl Misc Inject 1 Syringe into a muscle every two weeks. Give 18 gauge to draw up and 23 gauge to dispense Please give 0.5 ml Syringe, not 3 mL syringe 20 each 5 \"     And if this is DME order or a prescription?   "

## 2023-07-27 NOTE — TELEPHONE ENCOUNTER
Called patients mom. She states she gets them at the pharmacy. Pended the only one I could find and inserted your comment in message to pharmacy.    Kamryn Murcia RN on 7/27/2023 at 4:29 PM

## 2023-07-28 RX ORDER — IBUPROFEN 200 MG
TABLET ORAL
Qty: 200 EACH | Refills: 11 | Status: SHIPPED | OUTPATIENT
Start: 2023-07-28

## 2023-07-28 NOTE — TELEPHONE ENCOUNTER
Called and left message for patients  mom to call pharmacy for rx that was approved.     Kamryn Murcia RN on 7/28/2023 at 11:54 AM

## 2023-07-31 ENCOUNTER — TELEPHONE (OUTPATIENT)
Dept: FAMILY MEDICINE | Facility: CLINIC | Age: 63
End: 2023-07-31
Payer: MEDICARE

## 2023-07-31 DIAGNOSIS — R68.89 EXCESSIVE ORAL SECRETIONS: ICD-10-CM

## 2023-07-31 DIAGNOSIS — R13.10 DYSPHAGIA, UNSPECIFIED TYPE: Primary | ICD-10-CM

## 2023-07-31 NOTE — TELEPHONE ENCOUNTER
TO PCP    Patients mom calling needing prescription for food sent to [facility] as soon as possible.     Fax to #[phone] for urgent order. Patient needs right away.     Fax regular orders to #[phone].    Mom     Kamryn Murcia RN on 7/31/2023 at 12:06 PM

## 2023-08-01 NOTE — TELEPHONE ENCOUNTER
Orders faxed to [facility] at both fax numbers below via traditional fax and Epic.    Orin Bradley RN  -[facility]

## 2023-08-07 ENCOUNTER — HOSPITAL ENCOUNTER (INPATIENT)
Facility: CLINIC | Age: 63
LOS: 3 days | Discharge: HOME HEALTH | DRG: 871 | End: 2023-08-12
Attending: EMERGENCY MEDICINE | Admitting: HOSPITALIST
Payer: MEDICARE

## 2023-08-07 ENCOUNTER — APPOINTMENT (OUTPATIENT)
Dept: GENERAL RADIOLOGY | Facility: CLINIC | Age: 63
DRG: 871 | End: 2023-08-07
Attending: EMERGENCY MEDICINE
Payer: MEDICARE

## 2023-08-07 DIAGNOSIS — E83.41 HYPERMAGNESEMIA: ICD-10-CM

## 2023-08-07 DIAGNOSIS — E86.0 DEHYDRATION: ICD-10-CM

## 2023-08-07 DIAGNOSIS — J69.0 ASPIRATION PNEUMONIA, UNSPECIFIED ASPIRATION PNEUMONIA TYPE, UNSPECIFIED LATERALITY, UNSPECIFIED PART OF LUNG (H): Primary | ICD-10-CM

## 2023-08-07 DIAGNOSIS — E87.6 HYPOKALEMIA: ICD-10-CM

## 2023-08-07 DIAGNOSIS — R09.89 LABILE BLOOD PRESSURE: ICD-10-CM

## 2023-08-07 PROBLEM — D72.825 BANDEMIA: Status: ACTIVE | Noted: 2023-08-07

## 2023-08-07 LAB
ALBUMIN SERPL BCG-MCNC: 3.9 G/DL (ref 3.5–5.2)
ALBUMIN UR-MCNC: 30 MG/DL
ALP SERPL-CCNC: 104 U/L (ref 40–129)
ALT SERPL W P-5'-P-CCNC: 20 U/L (ref 0–70)
ANION GAP SERPL CALCULATED.3IONS-SCNC: 15 MMOL/L (ref 7–15)
APPEARANCE UR: CLEAR
AST SERPL W P-5'-P-CCNC: 26 U/L (ref 0–45)
BASOPHILS # BLD AUTO: 0.1 10E3/UL (ref 0–0.2)
BASOPHILS NFR BLD AUTO: 1 %
BILIRUB SERPL-MCNC: 0.3 MG/DL
BILIRUB UR QL STRIP: NEGATIVE
BUN SERPL-MCNC: 35 MG/DL (ref 8–23)
CALCIUM SERPL-MCNC: 8.7 MG/DL (ref 8.8–10.2)
CHLORIDE SERPL-SCNC: 89 MMOL/L (ref 98–107)
COLOR UR AUTO: YELLOW
CREAT SERPL-MCNC: 1.65 MG/DL (ref 0.67–1.17)
DEPRECATED HCO3 PLAS-SCNC: 35 MMOL/L (ref 22–29)
EOSINOPHIL # BLD AUTO: 0.7 10E3/UL (ref 0–0.7)
EOSINOPHIL NFR BLD AUTO: 6 %
ERYTHROCYTE [DISTWIDTH] IN BLOOD BY AUTOMATED COUNT: 13.8 % (ref 10–15)
FLUAV RNA SPEC QL NAA+PROBE: NEGATIVE
FLUBV RNA RESP QL NAA+PROBE: NEGATIVE
GFR SERPL CREATININE-BSD FRML MDRD: 47 ML/MIN/1.73M2
GLUCOSE SERPL-MCNC: 120 MG/DL (ref 70–99)
GLUCOSE UR STRIP-MCNC: NEGATIVE MG/DL
HCT VFR BLD AUTO: 50.7 % (ref 40–53)
HGB BLD-MCNC: 16.4 G/DL (ref 13.3–17.7)
HGB UR QL STRIP: NEGATIVE
HYALINE CASTS: 6 /LPF
IMM GRANULOCYTES # BLD: 0 10E3/UL
IMM GRANULOCYTES NFR BLD: 0 %
KETONES UR STRIP-MCNC: NEGATIVE MG/DL
LACTATE SERPL-SCNC: 2 MMOL/L (ref 0.7–2)
LEUKOCYTE ESTERASE UR QL STRIP: NEGATIVE
LYMPHOCYTES # BLD AUTO: 4 10E3/UL (ref 0.8–5.3)
LYMPHOCYTES NFR BLD AUTO: 34 %
MAGNESIUM SERPL-MCNC: 3 MG/DL (ref 1.7–2.3)
MCH RBC QN AUTO: 28.7 PG (ref 26.5–33)
MCHC RBC AUTO-ENTMCNC: 32.3 G/DL (ref 31.5–36.5)
MCV RBC AUTO: 89 FL (ref 78–100)
MONOCYTES # BLD AUTO: 1.3 10E3/UL (ref 0–1.3)
MONOCYTES NFR BLD AUTO: 11 %
MUCOUS THREADS #/AREA URNS LPF: PRESENT /LPF
NEUTROPHILS # BLD AUTO: 5.8 10E3/UL (ref 1.6–8.3)
NEUTROPHILS NFR BLD AUTO: 48 %
NITRATE UR QL: NEGATIVE
NRBC # BLD AUTO: 0 10E3/UL
NRBC BLD AUTO-RTO: 0 /100
PH UR STRIP: 6.5 [PH] (ref 5–7)
PLATELET # BLD AUTO: 202 10E3/UL (ref 150–450)
POTASSIUM SERPL-SCNC: 3 MMOL/L (ref 3.4–5.3)
PROCALCITONIN SERPL IA-MCNC: 0.1 NG/ML
PROT SERPL-MCNC: 7.7 G/DL (ref 6.4–8.3)
RBC # BLD AUTO: 5.72 10E6/UL (ref 4.4–5.9)
RBC URINE: <1 /HPF
RSV RNA SPEC NAA+PROBE: NEGATIVE
SARS-COV-2 RNA RESP QL NAA+PROBE: NEGATIVE
SODIUM SERPL-SCNC: 139 MMOL/L (ref 136–145)
SP GR UR STRIP: 1.02 (ref 1–1.03)
UROBILINOGEN UR STRIP-MCNC: 2 MG/DL
WBC # BLD AUTO: 11.9 10E3/UL (ref 4–11)
WBC URINE: 2 /HPF

## 2023-08-07 PROCEDURE — 999N000157 HC STATISTIC RCP TIME EA 10 MIN

## 2023-08-07 PROCEDURE — 83735 ASSAY OF MAGNESIUM: CPT | Performed by: EMERGENCY MEDICINE

## 2023-08-07 PROCEDURE — 80053 COMPREHEN METABOLIC PANEL: CPT | Performed by: EMERGENCY MEDICINE

## 2023-08-07 PROCEDURE — 87040 BLOOD CULTURE FOR BACTERIA: CPT | Performed by: EMERGENCY MEDICINE

## 2023-08-07 PROCEDURE — 96361 HYDRATE IV INFUSION ADD-ON: CPT

## 2023-08-07 PROCEDURE — 93005 ELECTROCARDIOGRAM TRACING: CPT

## 2023-08-07 PROCEDURE — 36415 COLL VENOUS BLD VENIPUNCTURE: CPT | Performed by: EMERGENCY MEDICINE

## 2023-08-07 PROCEDURE — 81001 URINALYSIS AUTO W/SCOPE: CPT | Performed by: EMERGENCY MEDICINE

## 2023-08-07 PROCEDURE — 99285 EMERGENCY DEPT VISIT HI MDM: CPT | Mod: 25

## 2023-08-07 PROCEDURE — 258N000003 HC RX IP 258 OP 636: Performed by: EMERGENCY MEDICINE

## 2023-08-07 PROCEDURE — 84145 PROCALCITONIN (PCT): CPT | Performed by: EMERGENCY MEDICINE

## 2023-08-07 PROCEDURE — 85025 COMPLETE CBC W/AUTO DIFF WBC: CPT | Performed by: EMERGENCY MEDICINE

## 2023-08-07 PROCEDURE — 83605 ASSAY OF LACTIC ACID: CPT | Performed by: EMERGENCY MEDICINE

## 2023-08-07 PROCEDURE — 87637 SARSCOV2&INF A&B&RSV AMP PRB: CPT | Performed by: EMERGENCY MEDICINE

## 2023-08-07 PROCEDURE — 71046 X-RAY EXAM CHEST 2 VIEWS: CPT

## 2023-08-07 PROCEDURE — 96360 HYDRATION IV INFUSION INIT: CPT

## 2023-08-07 RX ORDER — POTASSIUM CHLORIDE 7.45 MG/ML
10 INJECTION INTRAVENOUS ONCE
Status: COMPLETED | OUTPATIENT
Start: 2023-08-07 | End: 2023-08-08

## 2023-08-07 RX ADMIN — SODIUM CHLORIDE 1000 ML: 9 INJECTION, SOLUTION INTRAVENOUS at 21:10

## 2023-08-07 RX ADMIN — SODIUM CHLORIDE, POTASSIUM CHLORIDE, SODIUM LACTATE AND CALCIUM CHLORIDE 1000 ML: 600; 310; 30; 20 INJECTION, SOLUTION INTRAVENOUS at 23:07

## 2023-08-07 ASSESSMENT — ACTIVITIES OF DAILY LIVING (ADL)
ADLS_ACUITY_SCORE: 37
ADLS_ACUITY_SCORE: 37

## 2023-08-08 ENCOUNTER — MEDICAL CORRESPONDENCE (OUTPATIENT)
Dept: HEALTH INFORMATION MANAGEMENT | Facility: CLINIC | Age: 63
End: 2023-08-08

## 2023-08-08 PROBLEM — N17.9 ACUTE RENAL FAILURE (H): Status: ACTIVE | Noted: 2023-08-08

## 2023-08-08 LAB
ANION GAP SERPL CALCULATED.3IONS-SCNC: 13 MMOL/L (ref 7–15)
ATRIAL RATE - MUSE: 95 BPM
BASOPHILS # BLD AUTO: 0.1 10E3/UL (ref 0–0.2)
BASOPHILS NFR BLD AUTO: 1 %
BUN SERPL-MCNC: 29.2 MG/DL (ref 8–23)
CALCIUM SERPL-MCNC: 8.3 MG/DL (ref 8.8–10.2)
CHLORIDE SERPL-SCNC: 95 MMOL/L (ref 98–107)
CREAT SERPL-MCNC: 1.25 MG/DL (ref 0.67–1.17)
DEPRECATED HCO3 PLAS-SCNC: 32 MMOL/L (ref 22–29)
DIASTOLIC BLOOD PRESSURE - MUSE: NORMAL MMHG
EOSINOPHIL # BLD AUTO: 0.8 10E3/UL (ref 0–0.7)
EOSINOPHIL NFR BLD AUTO: 11 %
ERYTHROCYTE [DISTWIDTH] IN BLOOD BY AUTOMATED COUNT: 13.9 % (ref 10–15)
GFR SERPL CREATININE-BSD FRML MDRD: 66 ML/MIN/1.73M2
GLUCOSE BLDC GLUCOMTR-MCNC: 163 MG/DL (ref 70–99)
GLUCOSE SERPL-MCNC: 108 MG/DL (ref 70–99)
HCT VFR BLD AUTO: 43.8 % (ref 40–53)
HGB BLD-MCNC: 14.1 G/DL (ref 13.3–17.7)
IMM GRANULOCYTES # BLD: 0 10E3/UL
IMM GRANULOCYTES NFR BLD: 0 %
INTERPRETATION ECG - MUSE: NORMAL
LYMPHOCYTES # BLD AUTO: 3 10E3/UL (ref 0.8–5.3)
LYMPHOCYTES NFR BLD AUTO: 39 %
MAGNESIUM SERPL-MCNC: 2.6 MG/DL (ref 1.7–2.3)
MCH RBC QN AUTO: 28.8 PG (ref 26.5–33)
MCHC RBC AUTO-ENTMCNC: 32.2 G/DL (ref 31.5–36.5)
MCV RBC AUTO: 89 FL (ref 78–100)
MONOCYTES # BLD AUTO: 0.9 10E3/UL (ref 0–1.3)
MONOCYTES NFR BLD AUTO: 11 %
NEUTROPHILS # BLD AUTO: 2.9 10E3/UL (ref 1.6–8.3)
NEUTROPHILS NFR BLD AUTO: 38 %
NRBC # BLD AUTO: 0 10E3/UL
NRBC BLD AUTO-RTO: 0 /100
P AXIS - MUSE: 68 DEGREES
PLATELET # BLD AUTO: 145 10E3/UL (ref 150–450)
POTASSIUM SERPL-SCNC: 3.3 MMOL/L (ref 3.4–5.3)
POTASSIUM SERPL-SCNC: 4.6 MMOL/L (ref 3.4–5.3)
PR INTERVAL - MUSE: 182 MS
QRS DURATION - MUSE: 94 MS
QT - MUSE: 408 MS
QTC - MUSE: 512 MS
R AXIS - MUSE: -49 DEGREES
RBC # BLD AUTO: 4.9 10E6/UL (ref 4.4–5.9)
SODIUM SERPL-SCNC: 140 MMOL/L (ref 136–145)
SYSTOLIC BLOOD PRESSURE - MUSE: NORMAL MMHG
T AXIS - MUSE: 54 DEGREES
VENTRICULAR RATE- MUSE: 95 BPM
WBC # BLD AUTO: 7.8 10E3/UL (ref 4–11)

## 2023-08-08 PROCEDURE — 99207 PR APP CREDIT; MD BILLING SHARED VISIT: CPT | Performed by: INTERNAL MEDICINE

## 2023-08-08 PROCEDURE — 36415 COLL VENOUS BLD VENIPUNCTURE: CPT | Performed by: INTERNAL MEDICINE

## 2023-08-08 PROCEDURE — 96374 THER/PROPH/DIAG INJ IV PUSH: CPT

## 2023-08-08 PROCEDURE — 85025 COMPLETE CBC W/AUTO DIFF WBC: CPT | Performed by: HOSPITALIST

## 2023-08-08 PROCEDURE — 83735 ASSAY OF MAGNESIUM: CPT | Performed by: HOSPITALIST

## 2023-08-08 PROCEDURE — 99223 1ST HOSP IP/OBS HIGH 75: CPT | Mod: AI | Performed by: HOSPITALIST

## 2023-08-08 PROCEDURE — 250N000011 HC RX IP 250 OP 636: Performed by: HOSPITALIST

## 2023-08-08 PROCEDURE — 96361 HYDRATE IV INFUSION ADD-ON: CPT

## 2023-08-08 PROCEDURE — 36415 COLL VENOUS BLD VENIPUNCTURE: CPT | Performed by: HOSPITALIST

## 2023-08-08 PROCEDURE — G0378 HOSPITAL OBSERVATION PER HR: HCPCS

## 2023-08-08 PROCEDURE — 250N000011 HC RX IP 250 OP 636: Mod: JZ | Performed by: EMERGENCY MEDICINE

## 2023-08-08 PROCEDURE — 250N000013 HC RX MED GY IP 250 OP 250 PS 637: Performed by: INTERNAL MEDICINE

## 2023-08-08 PROCEDURE — 82962 GLUCOSE BLOOD TEST: CPT

## 2023-08-08 PROCEDURE — 80048 BASIC METABOLIC PNL TOTAL CA: CPT | Performed by: HOSPITALIST

## 2023-08-08 PROCEDURE — 84132 ASSAY OF SERUM POTASSIUM: CPT | Performed by: INTERNAL MEDICINE

## 2023-08-08 RX ORDER — DEXTROSE MONOHYDRATE 100 MG/ML
INJECTION, SOLUTION INTRAVENOUS CONTINUOUS PRN
Status: DISCONTINUED | OUTPATIENT
Start: 2023-08-08 | End: 2023-08-12 | Stop reason: HOSPADM

## 2023-08-08 RX ORDER — ONDANSETRON 2 MG/ML
4 INJECTION INTRAMUSCULAR; INTRAVENOUS EVERY 6 HOURS PRN
Status: DISCONTINUED | OUTPATIENT
Start: 2023-08-08 | End: 2023-08-12 | Stop reason: HOSPADM

## 2023-08-08 RX ORDER — SODIUM CHLORIDE AND POTASSIUM CHLORIDE 150; 900 MG/100ML; MG/100ML
INJECTION, SOLUTION INTRAVENOUS CONTINUOUS
Status: DISCONTINUED | OUTPATIENT
Start: 2023-08-08 | End: 2023-08-09

## 2023-08-08 RX ORDER — ONDANSETRON 4 MG/1
4 TABLET, ORALLY DISINTEGRATING ORAL EVERY 6 HOURS PRN
Status: DISCONTINUED | OUTPATIENT
Start: 2023-08-08 | End: 2023-08-12 | Stop reason: HOSPADM

## 2023-08-08 RX ORDER — POTASSIUM CHLORIDE 1.5 G/1.58G
40 POWDER, FOR SOLUTION ORAL ONCE
Status: COMPLETED | OUTPATIENT
Start: 2023-08-08 | End: 2023-08-08

## 2023-08-08 RX ORDER — POTASSIUM CHLORIDE 7.45 MG/ML
10 INJECTION INTRAVENOUS
Status: DISCONTINUED | OUTPATIENT
Start: 2023-08-08 | End: 2023-08-08

## 2023-08-08 RX ADMIN — POTASSIUM CHLORIDE 10 MEQ: 10 INJECTION, SOLUTION INTRAVENOUS at 00:58

## 2023-08-08 RX ADMIN — POTASSIUM CHLORIDE 40 MEQ: 1.5 POWDER, FOR SOLUTION ORAL at 16:23

## 2023-08-08 RX ADMIN — POTASSIUM CHLORIDE AND SODIUM CHLORIDE: 900; 150 INJECTION, SOLUTION INTRAVENOUS at 18:12

## 2023-08-08 RX ADMIN — POTASSIUM CHLORIDE AND SODIUM CHLORIDE: 900; 150 INJECTION, SOLUTION INTRAVENOUS at 02:00

## 2023-08-08 ASSESSMENT — ACTIVITIES OF DAILY LIVING (ADL)
ADLS_ACUITY_SCORE: 59
ADLS_ACUITY_SCORE: 59
ADLS_ACUITY_SCORE: 57
ADLS_ACUITY_SCORE: 53
ADLS_ACUITY_SCORE: 37
ADLS_ACUITY_SCORE: 59
ADLS_ACUITY_SCORE: 37
ADLS_ACUITY_SCORE: 59
ADLS_ACUITY_SCORE: 59
ADLS_ACUITY_SCORE: 57
ADLS_ACUITY_SCORE: 59
ADLS_ACUITY_SCORE: 53

## 2023-08-08 NOTE — ED NOTES
Bed: ED13  Expected date: 8/7/23  Expected time: 7:07 PM  Means of arrival: Ambulance  Comments:  Edinkalyani 62m fever, altered ETA 1910

## 2023-08-08 NOTE — ED NOTES
Patient placed on an oxy mask at 5 L as he was de-sating in the mid 80's. Currently at 96% on the mask. RT came and assessed him.
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
## 2023-08-08 NOTE — PLAN OF CARE
Goal Outcome Evaluation:      Plan of Care Reviewed With: patient    Overall Patient Progress: no changeOverall Patient Progress: no change      Observation goals  PRIOR TO DISCHARGE        Comments:   -diagnostic tests and consults completed and resulted: Not met  -vital signs normal or at patient baseline: Met  -returns to baseline functional status: Met  -safe disposition plan has been identified: Partially met  Nurse to notify provider when observation goals have been met and patient is ready for discharge.
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
## 2023-08-08 NOTE — CONSULTS
"Received Provider Order to see patient --> Registered Dietitian to order TF per Medical Nutrition Therapy Guidelines   Patient is OBS status, therefore a complete nutrition assessment will be deferred at this time     Chart reviewed, patient is very well known to our services d/t frequent hospitalizations  He has an existing G-J tube and receives home TF     Patient is currently receiving MIVF at 100 mL/hr    Ht = 6'0\"  Wt = 73.4 kg (162#)(8/8)  IBW = 80.9 kg (91% IBW)    ASSESSED NUTRITION NEEDS PER APPROVED PRACTICE GUIDELINES:     Dosing Weight 73.4 kg   Estimated Energy Needs: 2108-5831 kcals (25-30 Kcal/Kg)  Justification: maintenance  Estimated Protein Needs:  grams protein (1.2-1.5 g pro/Kg)  Justification: maintenance  Estimated Fluid Needs: 1 mL/kcal   Justification: maintenance    Patient's weight typically runs in the 150-160# range    Patient was most recently receiving the following TF regimen (11/2022) -->   Jevity 1.5 at 60 mL/hr x 22 hours per day (holding TF 1 hour before and 1 hour after Synthroid dose in AM)- 1980 kcal (27 kcal/kg), 84 g protein (1.2 g/kg), 28 g fiber, 1003 mL H2O  FWF- 100 mL q 4 hours      Noted patient is currently not receiving Synthroid   Therefore, will run Jevity 1.5 at 55 mL/hr x 24 hours to provide:  1980 kcal (27 kcal/kg), 84 g protein (1.1 g/kg), 28 g fiber, 1003 mL H2O  Flushes 30 mL every 4 hours while on MIVF     Swati Parrish RD, LD, CNSC   Clinical Dietitian - [facility]           "
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
## 2023-08-08 NOTE — PLAN OF CARE
Goal Outcome Evaluation:      Plan of Care Reviewed With: patient    Overall Patient Progress: no changeOverall Patient Progress: no change    Summary:  evaluation of fever and increased daytime somnolence as well as acute kidney injury and hypokalemia   Care Plan Summary Note: [id number]  Orientation: Alert, nonverbal. Able to make facial expressions and give thumbs up.   Observation Goals (met & not met): not met   Activity Level: A2 lift; turn and repo  Fall Risk: yes  Behavior & Aggression Tool Color: green   Pain Management: no c/o pain; appears calm and comfortable.   ABNL VS/O2: VSS; afebrile. On RA  ABNL Lab/BG: K+ 3.3, replacment initiated   Diet: NPO; PO cares. Started on TF Jevity 1.5 initiated at approx 1345 at 55ml/hr with FWF 30ml q 4 hr.     Bowel/Bladder: Incontinent B/B. 1 BM today  Drains/Devices: piv infusing 0.9 na + KCL 20 mEq/L  Tests/Procedures for next shift: n/a   Anticipated DC date: 8/9  pending stability of vitals and labs.       Observation goals  PRIOR TO DISCHARGE        Comments:   -diagnostic tests and consults completed and resulted: Not met  -vital signs normal or at patient baseline: Met  -returns to baseline functional status: Met  -safe disposition plan has been identified: Partially met  Nurse to notify provider when observation goals have been met and patient is ready for discharge.

## 2023-08-08 NOTE — PHARMACY-ADMISSION MEDICATION HISTORY
Pharmacist Admission Medication History    Admission medication history is complete. The information provided in this note is only as accurate as the sources available at the time of the update.    Medication reconciliation/reorder completed by provider prior to medication history? No    Information Source(s): Family member via phone    Pertinent Information: mother unable to confirm last dose, stated that patient did take most of meds prior to coming in.      Changes made to PTA medication list:  Added: None  Deleted: levothyroxine 125 mcg  Changed: None         Allergies reviewed with patient and updates made in EHR: no    Medication History Completed By: Noa Flanagan RPH 8/8/2023 9:51 AM    Prior to Admission medications    Medication Sig Last Dose Taking? Auth Provider Long Term End Date   acetaminophen (TYLENOL) 650 MG CR tablet Take 650 mg by mouth every 8 hours as needed for mild pain or fever Past Week at PRn Yes Unknown, Entered By History     albuterol (PROVENTIL) (5 MG/ML) 0.5% neb solution Take 0.5 mLs (2.5 mg) by nebulization every 6 hours as needed for shortness of breath, wheezing or cough 0700 1100 1500 1900 with mucomyst Past Week Yes Edita Pope, DO Yes    azelastine (OPTIVAR) 0.05 % ophthalmic solution INSTILL 1 DROP IN AFFECTED EYE(S) TWICE DAILY FOR 10 DAYS Unknown Yes Elsa Queen MD     bacitracin 500 UNIT/GM external ointment Apply topically daily as needed for wound care To PEG site. Unknown Yes Elsa Queen MD     Brivaracetam (BRIVIACT) 10 MG/ML solution 100 mg by Oral or Feeding Tube route 2 times daily 0900, 2100 8/7/2023 at 2100 Yes Unknown, Entered By History No    carBAMazepine (TEGRETOL) 100 MG/5ML suspension Take 100 mg by mouth daily Take at 1800 8/7/2023 at 1800 Yes Unknown, Entered By History Yes    carBAMazepine (TEGRETOL) 100 MG/5ML suspension 150 mg by Oral or Feeding Tube route 3 times daily At 06:00, 12:00, and 24:00 for seizures 8/7/2023 at 1200 Yes Unknown, Entered  "By History Yes    COMPOUNDED NON-CONTROLLED SUBSTANCE (CMPD RX) - PHARMACY TO MIX COMPOUNDED MEDICATION Scopolamine 0.4mg capsules - take 1 capsule by feeding tube three times daily as needed 8/7/2023 Yes Elsa Queen MD     glycopyrrolate (ROBINUL) 1 MG tablet Take 1 tablet (1 mg) by mouth 2 times daily as needed (secretions) Unknown Yes Elsa Queen MD No    guaiFENesin (MUCINEX) 600 MG 12 hr tablet Take 1 tablet (600 mg) by mouth 2 times daily as needed for congestion Unknown Yes Edita Pope,  No    hydrocortisone (CORTAID) 1 % external cream Apply topically 2 times daily as needed Apply to reddened memo areas as needed Unknown Yes Unknown, Entered By History     hydrocortisone (CORTEF) 5 MG tablet Take 15 mg (3 tablets) in the morning and 5 mg (1 tablet)  at 2:00 PM. During illness patient takes more as a stress dose. Please increase the dose as directed.  Patient taking differently: Take 15 mg (3 tablets) in the morning and 7.5 mg (1 tablet)  at 2:00 PM. During illness patient takes more as a stress dose. Please increase the dose as directed. 8/7/2023 at 1800 Yes Faizan Mclean MD Yes    insulin syringe-needle U-100 (29G X 1/2\" 1 ML) 29G X 1/2\" 1 ML miscellaneous Syringe needle use as needed Past Week Yes Elsa Queen MD     levothyroxine (SYNTHROID/LEVOTHROID) 150 MCG tablet TAKE 1 TABLET(150 MCG) BY MOUTH DAILY 8/7/2023 Yes Faizan Mclean MD Yes    metoclopramide (REGLAN) 10 MG/10ML SOLN solution Take 10 mLs (10 mg) by mouth 4 times daily (before meals and nightly) 0800, 1200, 1600, 2000 Disconnects bag before administration, then waits 45 mins before reconnecting after giving the medication 8/7/2023 Yes Elsa Queen MD     multivitamin, therapeutic (THERA-VIT) TABS tablet Take 1 tablet by mouth daily 8/7/2023 Yes Reported, Patient     mupirocin (BACTROBAN) 2 % external ointment APPLY TOPICALLY TO THE AFFECTED AREA TWICE DAILY AS NEEDED Unknown Yes Elsa Queen MD   "   neomycin-polymixin-dexamethasone (MAXITROL) 0.1 % ophthalmic suspension Use in both eyes every 4 hours for 7 days Unknown Yes Elsa Queen MD     pantoprazole (PROTONIX) 2 mg/mL SUSP suspension TAKE 20 ML PER FEEDING TUBE DAILY 8/7/2023 Yes Elsa Queen MD     sodium bicarbonate 650 MG tablet TAKE 1 TABLET(650 MG) BY MOUTH TWICE DAILY 8/7/2023 Yes Elsa Queen MD     testosterone cypionate (DEPOTESTOSTERONE) 200 MG/ML injection Inject 0.25 mLs (50 mg) into the muscle once a week Unknown Yes Faizan Mclean MD Yes    vitamin C (ASCORBIC ACID) 1000 MG TABS 1,000 mg by Oral or Feeding Tube route daily  8/7/2023 Yes Unknown, Entered By History     vitamin D3 (CHOLECALCIFEROL) 2000 units (50 mcg) tablet Take 2,000 Units by mouth daily Crush and feed via j-tube @@ 0900 8/7/2023 Yes Unknown, Entered By History No

## 2023-08-08 NOTE — PROGRESS NOTES
RECEIVING UNIT ED HANDOFF REVIEW    ED Nurse Handoff Report was reviewed by: Mariana Mina RN on August 8, 2023 at 12:33 AM

## 2023-08-08 NOTE — PLAN OF CARE
Goal Outcome Evaluation:    Summary:    Care Plan Summary Note:  Orientation: HECTOR   Observation Goals (met & not met): not met   Activity Level: A2 lift   Fall Risk: yes  Behavior & Aggression Tool Color: green   Pain Management: no c/o pain   ABNL VS/O2: vss on ra   ABNL Lab/BG: K+ 3.0 on replacement   Diet: npo   Bowel/Bladder: Incontinent  Drains/Devices: piv infusing 0.9 na + KCL 20 mEq/L  Tests/Procedures for next shift: nutrition and sw consults   Anticipated DC date: TBD    Observation goals  PRIOR TO DISCHARGE       Comments: -diagnostic tests and consults completed and resulted: not met   -vital signs normal or at patient baseline: met   -returns to baseline functional status: met   -safe disposition plan has been identified: met   Nurse to notify provider when observation goals have been met and patient is ready for discharge.

## 2023-08-08 NOTE — PROGRESS NOTES
[facility]    Hospitalist Progress Note    Assessment & Plan   Keyon Farias is a 62 year old male with PMHx of TBI with aphasia, R sided spastic hemiplegia, chronic hemiplegia and chronic dysphagia with GJ-tube for TFs/meds, seizure disorder, panhypopituitarism and hx of prior hospitalizations for recurrent pneumonia who was admitted under observation status on 8/7/2023 for evaluation of fever and increased daytime somnolence as well as acute kidney injury and hypokalemia.      Fever of unclear etiology  Generalized weakness  * Has hx of prior hospitalizations for aspiration pneumonitis. Last hospital stay was in 11/0223.   * Reportedly febrile at home with TMax 100.5, with increased daytime somnolence.   * In ED, was afebrile, mildly tachycardic (HR 110s) but BPs stable. Initially required O2 but was quickly weaned off and O2 sats stable on RA since. WBC 11.9, procal 0.1 (low risk), lactate nl. COVID, RSV and influenza swabs were neg. CXR with bibasilar subsegmental atelectasis but no focal infiltrate. UA bland and not suggestive of UTI. Empiric abx held on admission. Placed on IVFs  -- remains afebrile off abx, WBC quickly normalized  -- blood cultures drawn on admission still pending  -- cont monitoring off abx    Acute kidney injury: Improved  Dehydration  Hypokalemia: Improved  * Labs on admission notable for K 3.0, Cr 1.65 (baseline 0.8-1.0). IVFs with KCl started on admisson  -- labs improving, cont supportive cares with IVFs  -- has K replacement protocol  -- repeat BMP in AM    Spastic hemiplegia  Traumatic brain injury  Nonverbal  Chronic right-sided paresis  Chronic dysphagia, s/p GJ tube  * Bed-bound and essentially nonverbal at baseline. Takes meds via G-tube. 5 cans Jevity/d via Gtube.  * Chronic and stable on home meds, including Tegretol and Brivaracetam  * Nutritionist consulted for TFs this stay.      Panhypopituitarism  Hypothyroidism  * Chronic and stable on home meds,  including hydrocortisone and Synthroid      GERD  * Chronic and stable on PPI    FEN: cont IVFs, lytes otherwise stable, resume TFs  DVT Prophylaxis: PCDs  Code Status: Full Code    Disposition: Anticipate discharge home tomorrow if labs stable and remains afebrile.     Patient's mother (guardian) at bedside this afternoon and updated.     Sun Keita, DO    Medical Decision Making       Please see A&P for additional details of medical decision making.           Interval History   Chart reviewed. Patient seen this afternoon. Resting comfortably. Alert. NAD.     -Data reviewed today: I reviewed all new labs and imaging results over the last 24 hours. I personally reviewed no images or EKG's today.    Physical Exam   Temp: 97.9  F (36.6  C) Temp src: Axillary BP: 114/64 Pulse: 79   Resp: 20 SpO2: 99 % O2 Device: None (Room air)    Vitals:    08/08/23 0900   Weight: 73.4 kg (161 lb 13.1 oz)     Vital Signs with Ranges  Temp:  [97.6  F (36.4  C)-99.5  F (37.5  C)] 97.9  F (36.6  C)  Pulse:  [] 79  Resp:  [7-27] 20  BP: ()/(51-99) 114/64  SpO2:  [85 %-100 %] 99 %  No intake/output data recorded.    Constitutional: Resting comfortably, nonverbal, NAD  Respiratory: CTAB, no wheeze/rales/rhonchi no increased work of breathing  Cardiovascular: HRRR, no MGR, no LE edema  GI: S, NT, ND, +BS, +G tube  Skin/Integumen: warm/dry  Other:      Medications    0.9% sodium chloride + KCl 20 mEq/L 100 mL/hr at 08/08/23 0200    dextrose        sodium chloride (PF)  3 mL Intracatheter Q8H       Data   Recent Labs   Lab 08/08/23  0713 08/08/23  0646 08/07/23 2024   WBC 7.8  --  11.9*   HGB 14.1  --  16.4   MCV 89  --  89   *  --  202   NA  --  140 139   POTASSIUM  --  3.3* 3.0*   CHLORIDE  --  95* 89*   CO2  --  32* 35*   BUN  --  29.2* 35.0*   CR  --  1.25* 1.65*   ANIONGAP  --  13 15   STEVE  --  8.3* 8.7*   GLC  --  108* 120*   ALBUMIN  --   --  3.9   PROTTOTAL  --   --  7.7   BILITOTAL  --   --  0.3    ALKPHOS  --   --  104   ALT  --   --  20   AST  --   --  26       Recent Results (from the past 24 hour(s))   Chest XR,  PA & LAT    Narrative    EXAM: XR CHEST 2 VIEWS  LOCATION: [facility]  DATE: 8/7/2023    INDICATION: Hypoxia and sepsis.  COMPARISON: 04/06/2023      Impression    IMPRESSION: Heart size and vascularity are normal. Chronically elevated right hemidiaphragm unchanged. Shallow inspiration accentuates bibasilar subsegmental atelectasis. No focal consolidation, pneumothorax nor pleural effusion.

## 2023-08-08 NOTE — TELEPHONE ENCOUNTER
Savannah called asking about the orders from [facility] and if PCP has completed them. I confirmed that we did receive a fax from [facility] on 8/7/2023 that is currently with PCP to be completed and I will ask the provider to work on today to be faxed back to [facility].

## 2023-08-09 ENCOUNTER — APPOINTMENT (OUTPATIENT)
Dept: GENERAL RADIOLOGY | Facility: CLINIC | Age: 63
DRG: 871 | End: 2023-08-09
Attending: INTERNAL MEDICINE
Payer: MEDICARE

## 2023-08-09 PROBLEM — E83.41 HYPERMAGNESEMIA: Status: ACTIVE | Noted: 2023-08-09

## 2023-08-09 PROBLEM — E87.6 HYPOKALEMIA: Status: ACTIVE | Noted: 2023-08-09

## 2023-08-09 LAB
ANION GAP SERPL CALCULATED.3IONS-SCNC: 9 MMOL/L (ref 7–15)
BASE EXCESS BLDV CALC-SCNC: 0.2 MMOL/L (ref -7.7–1.9)
BUN SERPL-MCNC: 15.6 MG/DL (ref 8–23)
CALCIUM SERPL-MCNC: 8.1 MG/DL (ref 8.8–10.2)
CHLORIDE SERPL-SCNC: 106 MMOL/L (ref 98–107)
CREAT SERPL-MCNC: 0.83 MG/DL (ref 0.67–1.17)
DEPRECATED HCO3 PLAS-SCNC: 23 MMOL/L (ref 22–29)
ERYTHROCYTE [DISTWIDTH] IN BLOOD BY AUTOMATED COUNT: 13.8 % (ref 10–15)
GFR SERPL CREATININE-BSD FRML MDRD: >90 ML/MIN/1.73M2
GLUCOSE BLDC GLUCOMTR-MCNC: 145 MG/DL (ref 70–99)
GLUCOSE BLDC GLUCOMTR-MCNC: 151 MG/DL (ref 70–99)
GLUCOSE SERPL-MCNC: 158 MG/DL (ref 70–99)
HCO3 BLDV-SCNC: 26 MMOL/L (ref 21–28)
HCT VFR BLD AUTO: 46 % (ref 40–53)
HGB BLD-MCNC: 14.6 G/DL (ref 13.3–17.7)
LACTATE SERPL-SCNC: 1.9 MMOL/L (ref 0.7–2)
MAGNESIUM SERPL-MCNC: 2 MG/DL (ref 1.7–2.3)
MCH RBC QN AUTO: 28.9 PG (ref 26.5–33)
MCHC RBC AUTO-ENTMCNC: 31.7 G/DL (ref 31.5–36.5)
MCV RBC AUTO: 91 FL (ref 78–100)
O2/TOTAL GAS SETTING VFR VENT: 0 %
PCO2 BLDV: 45 MM HG (ref 40–50)
PH BLDV: 7.37 [PH] (ref 7.32–7.43)
PHOSPHATE SERPL-MCNC: 1.4 MG/DL (ref 2.5–4.5)
PHOSPHATE SERPL-MCNC: 1.4 MG/DL (ref 2.5–4.5)
PLATELET # BLD AUTO: 149 10E3/UL (ref 150–450)
PO2 BLDV: 39 MM HG (ref 25–47)
POTASSIUM SERPL-SCNC: 4.3 MMOL/L (ref 3.4–5.3)
PROCALCITONIN SERPL IA-MCNC: 0.07 NG/ML
RBC # BLD AUTO: 5.05 10E6/UL (ref 4.4–5.9)
SODIUM SERPL-SCNC: 138 MMOL/L (ref 136–145)
WBC # BLD AUTO: 11.3 10E3/UL (ref 4–11)

## 2023-08-09 PROCEDURE — 99291 CRITICAL CARE FIRST HOUR: CPT | Mod: 25 | Performed by: NURSE PRACTITIONER

## 2023-08-09 PROCEDURE — 250N000011 HC RX IP 250 OP 636: Performed by: HOSPITALIST

## 2023-08-09 PROCEDURE — 120N000001 HC R&B MED SURG/OB

## 2023-08-09 PROCEDURE — 82962 GLUCOSE BLOOD TEST: CPT

## 2023-08-09 PROCEDURE — 87040 BLOOD CULTURE FOR BACTERIA: CPT | Performed by: NURSE PRACTITIONER

## 2023-08-09 PROCEDURE — 94640 AIRWAY INHALATION TREATMENT: CPT

## 2023-08-09 PROCEDURE — 999N000157 HC STATISTIC RCP TIME EA 10 MIN

## 2023-08-09 PROCEDURE — 258N000003 HC RX IP 258 OP 636: Performed by: HOSPITALIST

## 2023-08-09 PROCEDURE — 36415 COLL VENOUS BLD VENIPUNCTURE: CPT | Performed by: INTERNAL MEDICINE

## 2023-08-09 PROCEDURE — 80048 BASIC METABOLIC PNL TOTAL CA: CPT | Performed by: INTERNAL MEDICINE

## 2023-08-09 PROCEDURE — 250N000013 HC RX MED GY IP 250 OP 250 PS 637: Performed by: HOSPITALIST

## 2023-08-09 PROCEDURE — G0378 HOSPITAL OBSERVATION PER HR: HCPCS

## 2023-08-09 PROCEDURE — 84100 ASSAY OF PHOSPHORUS: CPT | Performed by: NURSE PRACTITIONER

## 2023-08-09 PROCEDURE — 36415 COLL VENOUS BLD VENIPUNCTURE: CPT | Performed by: NURSE PRACTITIONER

## 2023-08-09 PROCEDURE — 85027 COMPLETE CBC AUTOMATED: CPT | Performed by: INTERNAL MEDICINE

## 2023-08-09 PROCEDURE — 94640 AIRWAY INHALATION TREATMENT: CPT | Mod: 76

## 2023-08-09 PROCEDURE — 71045 X-RAY EXAM CHEST 1 VIEW: CPT

## 2023-08-09 PROCEDURE — 83605 ASSAY OF LACTIC ACID: CPT | Performed by: NURSE PRACTITIONER

## 2023-08-09 PROCEDURE — 84100 ASSAY OF PHOSPHORUS: CPT | Performed by: INTERNAL MEDICINE

## 2023-08-09 PROCEDURE — 99233 SBSQ HOSP IP/OBS HIGH 50: CPT | Performed by: INTERNAL MEDICINE

## 2023-08-09 PROCEDURE — 82803 BLOOD GASES ANY COMBINATION: CPT | Performed by: NURSE PRACTITIONER

## 2023-08-09 PROCEDURE — 84145 PROCALCITONIN (PCT): CPT | Performed by: INTERNAL MEDICINE

## 2023-08-09 PROCEDURE — 250N000013 HC RX MED GY IP 250 OP 250 PS 637: Performed by: INTERNAL MEDICINE

## 2023-08-09 PROCEDURE — 83735 ASSAY OF MAGNESIUM: CPT | Performed by: NURSE PRACTITIONER

## 2023-08-09 PROCEDURE — 96361 HYDRATE IV INFUSION ADD-ON: CPT

## 2023-08-09 PROCEDURE — 250N000011 HC RX IP 250 OP 636: Mod: JZ | Performed by: NURSE PRACTITIONER

## 2023-08-09 PROCEDURE — 250N000009 HC RX 250: Performed by: HOSPITALIST

## 2023-08-09 PROCEDURE — 250N000009 HC RX 250: Performed by: NURSE PRACTITIONER

## 2023-08-09 PROCEDURE — 31720 CLEARANCE OF AIRWAYS: CPT

## 2023-08-09 RX ORDER — ACETAMINOPHEN 325 MG/1
975 TABLET ORAL ONCE
Status: DISCONTINUED | OUTPATIENT
Start: 2023-08-09 | End: 2023-08-09 | Stop reason: ALTCHOICE

## 2023-08-09 RX ORDER — AZITHROMYCIN 500 MG/1
500 INJECTION, POWDER, LYOPHILIZED, FOR SOLUTION INTRAVENOUS EVERY 24 HOURS
Status: COMPLETED | OUTPATIENT
Start: 2023-08-09 | End: 2023-08-09

## 2023-08-09 RX ORDER — GUAIFENESIN 600 MG/1
600 TABLET, EXTENDED RELEASE ORAL 2 TIMES DAILY PRN
Status: DISCONTINUED | OUTPATIENT
Start: 2023-08-09 | End: 2023-08-12 | Stop reason: HOSPADM

## 2023-08-09 RX ORDER — ACETYLCYSTEINE 200 MG/ML
2 SOLUTION ORAL; RESPIRATORY (INHALATION) EVERY 4 HOURS
Status: DISCONTINUED | OUTPATIENT
Start: 2023-08-09 | End: 2023-08-12 | Stop reason: HOSPADM

## 2023-08-09 RX ORDER — ACETAMINOPHEN 325 MG/10.15ML
975 LIQUID ORAL ONCE
Status: COMPLETED | OUTPATIENT
Start: 2023-08-09 | End: 2023-08-09

## 2023-08-09 RX ORDER — ACETAMINOPHEN 325 MG/1
650 TABLET ORAL EVERY 8 HOURS PRN
Status: DISCONTINUED | OUTPATIENT
Start: 2023-08-09 | End: 2023-08-12 | Stop reason: HOSPADM

## 2023-08-09 RX ORDER — HYDROCORTISONE 5 MG/1
5 TABLET ORAL DAILY
Status: DISCONTINUED | OUTPATIENT
Start: 2023-08-09 | End: 2023-08-09

## 2023-08-09 RX ORDER — PIPERACILLIN SODIUM, TAZOBACTAM SODIUM 3; .375 G/15ML; G/15ML
3.38 INJECTION, POWDER, LYOPHILIZED, FOR SOLUTION INTRAVENOUS EVERY 6 HOURS
Status: DISCONTINUED | OUTPATIENT
Start: 2023-08-09 | End: 2023-08-12 | Stop reason: HOSPADM

## 2023-08-09 RX ORDER — ACETAMINOPHEN 325 MG/1
975 TABLET ORAL ONCE
Status: COMPLETED | OUTPATIENT
Start: 2023-08-09 | End: 2023-08-09

## 2023-08-09 RX ORDER — CARBAMAZEPINE 100 MG/5ML
100 SUSPENSION ORAL DAILY
Status: DISCONTINUED | OUTPATIENT
Start: 2023-08-09 | End: 2023-08-12 | Stop reason: HOSPADM

## 2023-08-09 RX ORDER — GLYCOPYRROLATE 1 MG/1
1 TABLET ORAL 2 TIMES DAILY PRN
Status: DISCONTINUED | OUTPATIENT
Start: 2023-08-09 | End: 2023-08-12 | Stop reason: HOSPADM

## 2023-08-09 RX ORDER — SODIUM BICARBONATE 650 MG/1
650 TABLET ORAL 2 TIMES DAILY
Status: DISCONTINUED | OUTPATIENT
Start: 2023-08-09 | End: 2023-08-12 | Stop reason: HOSPADM

## 2023-08-09 RX ORDER — METOCLOPRAMIDE HYDROCHLORIDE 5 MG/5ML
10 SOLUTION ORAL
Status: DISCONTINUED | OUTPATIENT
Start: 2023-08-09 | End: 2023-08-12 | Stop reason: HOSPADM

## 2023-08-09 RX ORDER — ALBUTEROL SULFATE 5 MG/ML
2.5 SOLUTION RESPIRATORY (INHALATION) EVERY 6 HOURS PRN
Status: DISCONTINUED | OUTPATIENT
Start: 2023-08-09 | End: 2023-08-09

## 2023-08-09 RX ORDER — CARBAMAZEPINE 100 MG/5ML
150 SUSPENSION ORAL 3 TIMES DAILY
Status: DISCONTINUED | OUTPATIENT
Start: 2023-08-09 | End: 2023-08-12 | Stop reason: HOSPADM

## 2023-08-09 RX ORDER — LEVALBUTEROL INHALATION SOLUTION 1.25 MG/3ML
1.25 SOLUTION RESPIRATORY (INHALATION) EVERY 4 HOURS PRN
Status: DISCONTINUED | OUTPATIENT
Start: 2023-08-09 | End: 2023-08-12 | Stop reason: HOSPADM

## 2023-08-09 RX ORDER — LEVALBUTEROL INHALATION SOLUTION 1.25 MG/3ML
1.25 SOLUTION RESPIRATORY (INHALATION) EVERY 6 HOURS PRN
Status: DISCONTINUED | OUTPATIENT
Start: 2023-08-09 | End: 2023-08-09

## 2023-08-09 RX ORDER — ACETYLCYSTEINE 100 MG/ML
4 SOLUTION ORAL; RESPIRATORY (INHALATION) EVERY 4 HOURS
Status: DISCONTINUED | OUTPATIENT
Start: 2023-08-09 | End: 2023-08-09

## 2023-08-09 RX ORDER — LEVOTHYROXINE SODIUM 75 UG/1
150 TABLET ORAL DAILY
Status: DISCONTINUED | OUTPATIENT
Start: 2023-08-09 | End: 2023-08-12 | Stop reason: HOSPADM

## 2023-08-09 RX ADMIN — SODIUM BICARBONATE 650 MG TABLET 650 MG: at 12:23

## 2023-08-09 RX ADMIN — AZITHROMYCIN MONOHYDRATE 500 MG: 500 INJECTION, POWDER, LYOPHILIZED, FOR SOLUTION INTRAVENOUS at 12:16

## 2023-08-09 RX ADMIN — LEVALBUTEROL HYDROCHLORIDE 1.25 MG: 1.25 SOLUTION RESPIRATORY (INHALATION) at 22:53

## 2023-08-09 RX ADMIN — METOCLOPRAMIDE HYDROCHLORIDE 10 MG: 5 SOLUTION ORAL at 20:03

## 2023-08-09 RX ADMIN — SODIUM BICARBONATE 650 MG TABLET 650 MG: at 20:03

## 2023-08-09 RX ADMIN — CARBAMAZEPINE 150 MG: 100 SUSPENSION ORAL at 13:19

## 2023-08-09 RX ADMIN — ACETAMINOPHEN 975 MG: 325 SOLUTION ORAL at 11:47

## 2023-08-09 RX ADMIN — METOCLOPRAMIDE HYDROCHLORIDE 10 MG: 5 SOLUTION ORAL at 12:23

## 2023-08-09 RX ADMIN — PIPERACILLIN AND TAZOBACTAM 3.38 G: 3; .375 INJECTION, POWDER, FOR SOLUTION INTRAVENOUS at 11:31

## 2023-08-09 RX ADMIN — BRIVARACETAM 100 MG: 10 SOLUTION ORAL at 21:00

## 2023-08-09 RX ADMIN — ACETYLCYSTEINE 2 ML: 200 INHALANT RESPIRATORY (INHALATION) at 19:05

## 2023-08-09 RX ADMIN — LEVOTHYROXINE SODIUM 150 MCG: 75 TABLET ORAL at 11:41

## 2023-08-09 RX ADMIN — SODIUM PHOSPHATE, MONOBASIC, MONOHYDRATE AND SODIUM PHOSPHATE, DIBASIC, ANHYDROUS 15 MMOL: 142; 276 INJECTION, SOLUTION INTRAVENOUS at 20:48

## 2023-08-09 RX ADMIN — POTASSIUM CHLORIDE AND SODIUM CHLORIDE: 900; 150 INJECTION, SOLUTION INTRAVENOUS at 04:27

## 2023-08-09 RX ADMIN — LEVALBUTEROL HYDROCHLORIDE 1.25 MG: 1.25 SOLUTION RESPIRATORY (INHALATION) at 19:06

## 2023-08-09 RX ADMIN — CARBAMAZEPINE 100 MG: 100 SUSPENSION ORAL at 18:22

## 2023-08-09 RX ADMIN — BRIVARACETAM 100 MG: 10 SOLUTION ORAL at 11:39

## 2023-08-09 RX ADMIN — HYDROCORTISONE 15 MG: 10 TABLET ORAL at 11:41

## 2023-08-09 RX ADMIN — ACETYLCYSTEINE 2 ML: 200 INHALANT RESPIRATORY (INHALATION) at 22:53

## 2023-08-09 RX ADMIN — PIPERACILLIN AND TAZOBACTAM 3.38 G: 3; .375 INJECTION, POWDER, FOR SOLUTION INTRAVENOUS at 16:45

## 2023-08-09 ASSESSMENT — ACTIVITIES OF DAILY LIVING (ADL)
ADLS_ACUITY_SCORE: 55
ADLS_ACUITY_SCORE: 55
SWALLOWING: 2-->DIFFICULTY SWALLOWING LIQUIDS/FOODS
HEARING_DIFFICULTY_OR_DEAF: NO
ADLS_ACUITY_SCORE: 55
EATING: 2-->COMPLETELY DEPENDENT
EATING: 2-->COMPLETELY DEPENDENT (NOT DEVELOPMENTALLY APPROPRIATE)
CONCENTRATING,_REMEMBERING_OR_MAKING_DECISIONS_DIFFICULTY: YES
WALKING_OR_CLIMBING_STAIRS_DIFFICULTY: YES
WALKING_OR_CLIMBING_STAIRS: AMBULATION DIFFICULTY, REQUIRES EQUIPMENT;TRANSFERRING DIFFICULTY, REQUIRES EQUIPMENT
ADLS_ACUITY_SCORE: 55
CHANGE_IN_FUNCTIONAL_STATUS_SINCE_ONSET_OF_CURRENT_ILLNESS/INJURY: YES
EATING/SWALLOWING_MANAGEMENT: NPO
ADLS_ACUITY_SCORE: 59
ADLS_ACUITY_SCORE: 55
TRANSFERRING: 2-->COMPLETELY DEPENDENT (NOT DEVELOPMENTALLY APPROPRIATE)
DIFFICULTY_EATING/SWALLOWING: YES
DRESS: 2-->COMPLETELY DEPENDENT
FALL_HISTORY_WITHIN_LAST_SIX_MONTHS: NO
DRESS: 2-->COMPLETELY DEPENDENT (NOT DEVELOPMENTALLY APPROPRIATE)
DRESSING/BATHING: BATHING DIFFICULTY, DEPENDENT;DRESSING DIFFICULTY, DEPENDENT
BATHING: 2-->COMPLETELY DEPENDENT (NOT DEVELOPMENTALLY APPROPRIATE)
WEAR_GLASSES_OR_BLIND: NO
ADLS_ACUITY_SCORE: 55
ADLS_ACUITY_SCORE: 59
TOILETING: 2-->COMPLETELY DEPENDENT
TOILETING_ISSUES: YES
ADLS_ACUITY_SCORE: 55
SWALLOWING: 2-->DIFFICULTY SWALLOWING LIQUIDS/FOODS
DOING_ERRANDS_INDEPENDENTLY_DIFFICULTY: YES
ADLS_ACUITY_SCORE: 79
TRANSFERRING: 2-->COMPLETELY DEPENDENT
DRESSING/BATHING_DIFFICULTY: YES
TOILETING_ASSISTANCE: TOILETING DIFFICULTY, REQUIRES EQUIPMENT
EATING/SWALLOWING: OTHER (SEE COMMENTS)
ADLS_ACUITY_SCORE: 79
ADLS_ACUITY_SCORE: 59
TOILETING: 2-->COMPLETELY DEPENDENT (NOT DEVELOPMENTALLY APPROPRIATE)
EQUIPMENT_CURRENTLY_USED_AT_HOME: LIFT DEVICE;HOSPITAL BED
DIFFICULTY_COMMUNICATING: YES

## 2023-08-09 NOTE — DISCHARGE SUMMARY
[facility]    Discharge Summary  Hospitalist    Date of Admission:  8/7/2023  Date of Discharge:  8/9/2023  Discharging Provider: Sun Keita DO    Discharge Diagnoses   Fever of unclear etiology: Resolved  Generalized weakness: Improved  Acute kidney injury: Resolved  Dehydration: Resolved  Hypokalemia: Resolved  Spastic hemiplegia  Traumatic brain injury  Nonverbal  Chronic right-sided paresis  Chronic dysphagia, s/p GJ tube  Panhypopituitarism  Hypothyroidism  GERD    History of Present Illness   Keyon Farias is a 62 year old male with PMHx of TBI with aphasia, spastic hemiplegia and chronic dysphagia with GJ-tube for TFs/meds, seizure disorder, panhypopituitarism and hx of prior hospitalizations for recurrent pneumonia who was admitted under observation status on 8/7/2023 for evaluation of fever and increased daytime somnolence as well as acute kidney injury and hypokalemia.      Hospital Course   Keyon Farias was admitted on 8/7/2023.  The following problems were addressed during his hospitalization:    Fever of unclear etiology: Resolved  Generalized weakness: Improved  * Has hx of prior hospitalizations for aspiration pneumonitis. Last hospital stay was in 11/0223.   * Reportedly febrile at home with TMax 100.5, with increased daytime somnolence.   * In ED, was afebrile, mildly tachycardic (HR 110s) but BPs stable. Initially required O2 but was quickly weaned off and O2 sats stable on RA since. WBC 11.9, procal 0.1 (low risk), lactate nl. COVID, RSV and influenza swabs were neg. CXR with bibasilar subsegmental atelectasis but no focal infiltrate. UA bland and not suggestive of UTI. Empiric abx held on admission. Placed on IVFs  * Remained afebrile off abx, WBC quickly normalized. Blood cultures remained negative.   * Discharged home in stable condition.     Acute kidney injury: Resolved  Dehydration: Resolved   Hypokalemia: Resolved  * Labs on admission notable for K  3.0, Cr 1.65 (baseline 0.8-1.0). IVFs with KCl started on admisson.  * Renal function improved after IVFs. K replaced.      Spastic hemiplegia  Traumatic brain injury  Nonverbal  Chronic right-sided paresis  Chronic dysphagia, s/p GJ tube  * Bed-bound and essentially nonverbal at baseline. Takes meds via G-tube. 5 cans Jevity/d via Gtube.  * Chronic and stable on home meds, including Tegretol and Brivaracetam  * Nutritionist consulted for TFs this stay.      Panhypopituitarism  Hypothyroidism  * Chronic and stable on home meds, including hydrocortisone and Synthroid      GERD  * Chronic and stable on PPI    Sun Keita DO    Pending Results   These results will be followed up by: PCP  Unresulted Labs Ordered in the Past 30 Days of this Admission       Date and Time Order Name Status Description    8/7/2023  8:03 PM Blood Culture Peripheral Blood Preliminary     8/7/2023  8:03 PM Blood Culture Peripheral Blood Preliminary             Code Status   Full Code       Primary Care Physician   Elsa Queen    Physical Exam   Temp: 97.2  F (36.2  C) Temp src: Axillary BP: 124/79 Pulse: 107   Resp: 18 SpO2: 95 % O2 Device: None (Room air)    Vitals:    08/08/23 0900 08/09/23 0718   Weight: 73.4 kg (161 lb 13.1 oz) 74.5 kg (164 lb 3.9 oz)     Vital Signs with Ranges  Temp:  [97.2  F (36.2  C)-98.8  F (37.1  C)] 97.2  F (36.2  C)  Pulse:  [] 107  Resp:  [18-20] 18  BP: ()/(51-79) 124/79  SpO2:  [95 %-99 %] 95 %  I/O last 3 completed shifts:  In: 110 [NG/GT:110]  Out: 550 [Urine:550]    General: Resting comfortably, alert, nonverbal, NAD  CVS: HRRR, no MGR, no LE edema   Respiratory: CTAB, no wheeze/rales/rhonchi no increased work of breathing   GI: S, NT, ND, +BS, +G tube   Skin: Warm/dry    Discharge Disposition   Discharged to home  Condition at discharge: Stable    Consultations This Hospital Stay   NUTRITION SERVICES ADULT IP CONSULT  CARE MANAGEMENT / SOCIAL WORK IP CONSULT  PHARMACY IP  CONSULT    Time Spent on this Encounter   I, Sun Keita DO, personally saw the patient today and spent greater than 30 minutes discharging this patient.    Discharge Orders      Reason for your hospital stay    Evaluation for possible infection in light of your fever. No findings of infection were identified this stay and you had no fevers while here. Additionally, your labs suggested some degree of dehydration, for which you were treated with IV fluids and given some extra potassium.     Follow-up and recommended labs and tests     Follow up with your PCP as scheduled or as needed.     Activity    Your activity upon discharge: activity as tolerated     Diet    Follow this diet upon discharge: Resume tube feeds as you had been doing prior to admission     Discharge Medications   Current Discharge Medication List        CONTINUE these medications which have NOT CHANGED    Details   acetaminophen (TYLENOL) 650 MG CR tablet Take 650 mg by mouth every 8 hours as needed for mild pain or fever      albuterol (PROVENTIL) (5 MG/ML) 0.5% neb solution Take 0.5 mLs (2.5 mg) by nebulization every 6 hours as needed for shortness of breath, wheezing or cough 0700 1100 1500 1900 with mucomyst  Qty: 240 mL, Refills: 0    Associated Diagnoses: Aspiration pneumonitis (H)      azelastine (OPTIVAR) 0.05 % ophthalmic solution INSTILL 1 DROP IN AFFECTED EYE(S) TWICE DAILY FOR 10 DAYS  Qty: 6 mL, Refills: 3    Associated Diagnoses: Pink eye disease of both eyes      bacitracin 500 UNIT/GM external ointment Apply topically daily as needed for wound care To PEG site.  Qty: 30 g, Refills: 3    Associated Diagnoses: G tube feedings (H)      Brivaracetam (BRIVIACT) 10 MG/ML solution 100 mg by Oral or Feeding Tube route 2 times daily 0900, 2100      !! carBAMazepine (TEGRETOL) 100 MG/5ML suspension Take 100 mg by mouth daily Take at 1800      !! carBAMazepine (TEGRETOL) 100 MG/5ML suspension 150 mg by Oral or Feeding Tube route 3  "times daily At 06:00, 12:00, and 24:00 for seizures      COMPOUNDED NON-CONTROLLED SUBSTANCE (CMPD RX) - PHARMACY TO MIX COMPOUNDED MEDICATION Scopolamine 0.4mg capsules - take 1 capsule by feeding tube three times daily as needed  Qty: 90 capsule, Refills: 1    Associated Diagnoses: Excessive oral secretions      glycopyrrolate (ROBINUL) 1 MG tablet Take 1 tablet (1 mg) by mouth 2 times daily as needed (secretions)  Qty: 180 tablet, Refills: 1    Associated Diagnoses: Excessive oral secretions      guaiFENesin (MUCINEX) 600 MG 12 hr tablet Take 1 tablet (600 mg) by mouth 2 times daily as needed for congestion  Qty: 60 tablet, Refills: 0    Associated Diagnoses: Aspiration pneumonitis (H)      hydrocortisone (CORTAID) 1 % external cream Apply topically 2 times daily as needed Apply to reddened memo areas as needed      hydrocortisone (CORTEF) 5 MG tablet Take 15 mg (3 tablets) in the morning and 5 mg (1 tablet)  at 2:00 PM. During illness patient takes more as a stress dose. Please increase the dose as directed.  Qty: 400 tablet, Refills: 3    Associated Diagnoses: Secondary adrenal insufficiency (H)      insulin syringe-needle U-100 (29G X 1/2\" 1 ML) 29G X 1/2\" 1 ML miscellaneous Syringe needle use as needed  Qty: 200 each, Refills: 11    Associated Diagnoses: Panhypopituitarism (H)      levothyroxine (SYNTHROID/LEVOTHROID) 150 MCG tablet TAKE 1 TABLET(150 MCG) BY MOUTH DAILY  Qty: 90 tablet, Refills: 3    Associated Diagnoses: Secondary hypothyroidism      metoclopramide (REGLAN) 10 MG/10ML SOLN solution Take 10 mLs (10 mg) by mouth 4 times daily (before meals and nightly) 0800, 1200, 1600, 2000 Disconnects bag before administration, then waits 45 mins before reconnecting after giving the medication  Qty: 2365 mL, Refills: 5    Associated Diagnoses: Aspiration pneumonitis (H)      multivitamin, therapeutic (THERA-VIT) TABS tablet Take 1 tablet by mouth daily      mupirocin (BACTROBAN) 2 % external ointment APPLY " TOPICALLY TO THE AFFECTED AREA TWICE DAILY AS NEEDED  Qty: 30 g, Refills: 1    Associated Diagnoses: Panhypopituitarism (H); Hypogonadism male      neomycin-polymixin-dexamethasone (MAXITROL) 0.1 % ophthalmic suspension Use in both eyes every 4 hours for 7 days  Qty: 5 mL, Refills: 1    Comments: 1-2 drops in both eyes every 4-6 hours daily for 5-7 days.  Associated Diagnoses: Pink eye disease of both eyes      pantoprazole (PROTONIX) 2 mg/mL SUSP suspension TAKE 20 ML PER FEEDING TUBE DAILY  Qty: 600 mL, Refills: 3    Associated Diagnoses: Gastroesophageal reflux disease      sodium bicarbonate 650 MG tablet TAKE 1 TABLET(650 MG) BY MOUTH TWICE DAILY  Qty: 180 tablet, Refills: 3    Associated Diagnoses: Encephalopathy      testosterone cypionate (DEPOTESTOSTERONE) 200 MG/ML injection Inject 0.25 mLs (50 mg) into the muscle once a week  Qty: 4 mL, Refills: 3    Associated Diagnoses: Panhypopituitarism (H); Hypogonadism male      vitamin C (ASCORBIC ACID) 1000 MG TABS 1,000 mg by Oral or Feeding Tube route daily       vitamin D3 (CHOLECALCIFEROL) 2000 units (50 mcg) tablet Take 2,000 Units by mouth daily Crush and feed via j-tube @@ 0900       !! - Potential duplicate medications found. Please discuss with provider.        Allergies   Allergies   Allergen Reactions    Valproic Acid Other (See Comments)     Toxicity w/ bone marrow suspension, elevated ammonia levels     Dilantin [Phenytoin Sodium] Other (See Comments)     Severe Trembling    Scopolamine Hives     Hives with the patch - oral no problem     Data   Most Recent 3 CBC's:  Recent Labs   Lab Test 08/08/23  0713 08/07/23 2024 04/06/23 2236   WBC 7.8 11.9* 7.9   HGB 14.1 16.4 15.0   MCV 89 89 88   * 202 139*      Most Recent 3 BMP's:  Recent Labs   Lab Test 08/09/23  0657 08/08/23 2239 08/08/23 2224 08/08/23 0646 08/07/23 2024     --   --  140 139   POTASSIUM 4.3  --  4.6 3.3* 3.0*   CHLORIDE 106  --   --  95* 89*   CO2 23  --   --  32* 35*    BUN 15.6  --   --  29.2* 35.0*   CR 0.83  --   --  1.25* 1.65*   ANIONGAP 9  --   --  13 15   STEVE 8.1*  --   --  8.3* 8.7*   * 163*  --  108* 120*     Most Recent 2 LFT's:  Recent Labs   Lab Test 08/07/23  2024 01/15/23  1857   AST 26 26   ALT 20 27   ALKPHOS 104 93   BILITOTAL 0.3 0.8       Results for orders placed or performed during the hospital encounter of 08/07/23   Chest XR,  PA & LAT    Narrative    EXAM: XR CHEST 2 VIEWS  LOCATION: [facility]  DATE: 8/7/2023    INDICATION: Hypoxia and sepsis.  COMPARISON: 04/06/2023      Impression    IMPRESSION: Heart size and vascularity are normal. Chronically elevated right hemidiaphragm unchanged. Shallow inspiration accentuates bibasilar subsegmental atelectasis. No focal consolidation, pneumothorax nor pleural effusion.     *Note: Due to a large number of results and/or encounters for the requested time period, some results have not been displayed. A complete set of results can be found in Results Review.

## 2023-08-09 NOTE — PROGRESS NOTES
[facility]    Hospitalist Progress Note    Assessment & Plan   Keyon Farias is a 62 year old male with PMHx of TBI with aphasia, R sided spastic hemiplegia, chronic hemiplegia and chronic dysphagia with GJ-tube for TFs/meds, seizure disorder, panhypopituitarism and hx of prior hospitalizations for recurrent pneumonia who was admitted under observation status on 8/7/2023 for evaluation of fever and increased daytime somnolence as well as acute kidney injury and hypokalemia.      Fever of unclear etiology  Generalized weakness  * Has hx of prior hospitalizations for aspiration pneumonitis. Last hospital stay was in 11/0223.   * Reportedly febrile at home with TMax 100.5, with increased daytime somnolence.   * In ED, was afebrile, mildly tachycardic (HR 110s) but BPs stable. Initially required O2 but was quickly weaned off and O2 sats stable on RA since. WBC 11.9, procal 0.1 (low risk), lactate nl. COVID, RSV and influenza swabs were neg. CXR with bibasilar subsegmental atelectasis but no focal infiltrate. UA bland and not suggestive of UTI. Empiric abx held on admission. Placed on IVFs  -- initially remained afebrile off abx, WBC quickly normalized  -- blood cultures drawn on admission neg thus far  -- Tmax 99.4 on 8/9 AM with associated tachycardia; BPs okay, O2 sats stable on RA -- repeat CBC, procal and CXR  -- if procal trending up will consider addition of abx    1100 Addendum:  RRT called this morning for increased work of breathing and ongoing tachycardia with HRs 120s. O2 sats 97% on RA. pCXR w/o significant change from admission per my read. Repeat temp 102.3. Additional labs pending at present including lactate and CBC. Discussed with house NP -- will give IVFs, start mucomyst nebs, suction, add abx for CAP/aspiration (procal this AM was low risk at 0.07 but given new fever and increased work of breathing will cover for now). Currently on home doses of hydrocortisone, may need to  consider stress dosing.    Acute kidney injury: Improved  Dehydration  Hypokalemia: Improved  * Labs on admission notable for K 3.0, Cr 1.65 (baseline 0.8-1.0). IVFs with KCl started on admisson  * Cr normalized after IVFs. K replaced    Spastic hemiplegia  Traumatic brain injury  Nonverbal  Chronic right-sided paresis  Chronic dysphagia, s/p GJ tube  * Bed-bound and essentially nonverbal at baseline. Takes meds via G-tube. 5 cans Jevity/d via Gtube.  * Chronic and stable on home meds, including Tegretol and Brivaracetam  * Nutritionist consulted for TFs this stay.      Panhypopituitarism  Hypothyroidism  * Chronic and stable on home meds, including hydrocortisone and Synthroid      GERD  * Chronic and stable on PPI    FEN: cont IVFs, lytes otherwise stable, resume TFs  DVT Prophylaxis: PCDs  Code Status: Full Code    Disposition: Had anticipated discharge home today given no clear evidence of infection thus far, but now found to have low grade temp and tachycardia this AM. Additional labs ordered. Possible discharge home in next 1-2d pending workup/condition. Note patient was hoping to discharge on 8/10 to attend the [employer] concert.     Will update patient's mother (guardian) later today when she visits.    Sun Keita, DO    Medical Decision Making       Please see A&P for additional details of medical decision making.         Interval History   Chart reviewed. Uneventful night, remained afebrile. Now with slight temp this AM with Tmax 99.4. HRs 120s. Initially appeared lethargic but easily woke to name and after mentioning the [employer] concert, became more alert and interactive. Had large BM this AM.     -Data reviewed today: I reviewed all new labs and imaging results over the last 24 hours. I personally reviewed no images or EKG's today.    Physical Exam   Temp: 99.4  F (37.4  C) Temp src: Axillary BP: 117/72 Pulse: (!) 121   Resp: 26 SpO2: 96 % O2 Device: None (Room air)    Vitals:    08/08/23 0900  08/09/23 0718   Weight: 73.4 kg (161 lb 13.1 oz) 74.5 kg (164 lb 3.9 oz)     Vital Signs with Ranges  Temp:  [97.2  F (36.2  C)-99.4  F (37.4  C)] 99.4  F (37.4  C)  Pulse:  [] 121  Resp:  [18-26] 26  BP: ()/(64-79) 117/72  SpO2:  [95 %-99 %] 96 %  I/O last 3 completed shifts:  In: 110 [NG/GT:110]  Out: 550 [Urine:550]    Constitutional: Resting comfortably, nonverbal, NAD  Respiratory: few coarse BS mostly in upper airways, no wheeze/rales, no increased work of breathing  Cardiovascular: HRRR, no MGR, no LE edema  GI: S, NT, ND, +BS, +G tube  Skin/Integumen: warm/dry  Other:      Medications    0.9% sodium chloride + KCl 20 mEq/L 100 mL/hr at 08/09/23 0427    dextrose        sodium chloride (PF)  3 mL Intracatheter Q8H       Data   Recent Labs   Lab 08/09/23  0837 08/09/23  0657 08/08/23  2239 08/08/23  2224 08/08/23  0713 08/08/23  0646 08/07/23 2024   WBC  --   --   --   --  7.8  --  11.9*   HGB  --   --   --   --  14.1  --  16.4   MCV  --   --   --   --  89  --  89   PLT  --   --   --   --  145*  --  202   NA  --  138  --   --   --  140 139   POTASSIUM  --  4.3  --  4.6  --  3.3* 3.0*   CHLORIDE  --  106  --   --   --  95* 89*   CO2  --  23  --   --   --  32* 35*   BUN  --  15.6  --   --   --  29.2* 35.0*   CR  --  0.83  --   --   --  1.25* 1.65*   ANIONGAP  --  9  --   --   --  13 15   STEVE  --  8.1*  --   --   --  8.3* 8.7*   * 158* 163*  --   --  108* 120*   ALBUMIN  --   --   --   --   --   --  3.9   PROTTOTAL  --   --   --   --   --   --  7.7   BILITOTAL  --   --   --   --   --   --  0.3   ALKPHOS  --   --   --   --   --   --  104   ALT  --   --   --   --   --   --  20   AST  --   --   --   --   --   --  26         No results found for this or any previous visit (from the past 24 hour(s)).

## 2023-08-09 NOTE — PROVIDER NOTIFICATION
MD Notification    Notified Person: MD    Notified Person Name: Neela    Notification Date/Time: 8/9/23 1630    Notification Interaction: telephone    Purpose of Notification: clogged J tube    Orders Received: hold tube feedings overnight, Q4H BS checks, notify MD if becomes hypoglycemic.     Comments: attempted interventions to unclog J tube, not effective.

## 2023-08-09 NOTE — PROGRESS NOTES
Transfer in/out    Transfer to 619 from short-stay.  Report given to/received from EMPERATRIZ.    Pt arrived to room lethargic and sleeping. TF on hold. VSS on RA. Skin hot - applied ice packs under arms.      Code status: Full Code  Skin: Left groin scratch, other skin WNL  Fall Risk: Yes- Department fall risk interventions implemented.  Isolation: Contact  Patient belongings: none   If patient is a 72 hour hold/Commitment are belongings removed from room and locked up? NA  Medication drips upon transfer: LR bolus.  Sign and held orders released? Yes

## 2023-08-09 NOTE — PLAN OF CARE
Goal Outcome Evaluation:       Summary: acute renal failure, dehydration    DATE & TIME: 8/9/23 5631-8357    Cognitive Concerns/ Orientation : Alert, HECTOR orientation - pt. non verbal at baseline   BEHAVIOR & AGGRESSION TOOL COLOR: green  CIWA SCORE: NA   ABNL VS/O2: tachycardic, other VSS on RA  MOBILITY: Ax2/total assist, lift  PAIN MANAGMENT: no visual signs of pain/discomfort  DIET: NPO except ice chips  BOWEL/BLADDER: incontinent  ABNL LAB/BG: Ph 1.4 (replaced & recheck @ 2300)  DRAIN/DEVICES: 2 L. PIVs   TELEMETRY RHYTHM: NA  SKIN: redness blanchable on perineum/buttocks/sacrum, abrasions on L arm and thigh and R. knee  TESTS/PROCEDURES: none  D/C DAY/GOALS/PLACE: pending improvements  OTHER IMPORTANT INFO: J tube clogged upon arrival to unit, orders to hold TF overnight with BS checks - notify on-call if hypoglycemic. G tube is patent for med administration.

## 2023-08-09 NOTE — PROGRESS NOTES
Observation goals  PRIOR TO DISCHARGE        Comments: -diagnostic tests and consults completed and resulted- not met  -vital signs normal or at patient baseline- met  -returns to baseline functional status- met  -safe disposition plan has been identified- not met. SW/CC following.  Nurse to notify provider when observation goals have been met and patient is ready for discharge.

## 2023-08-09 NOTE — PLAN OF CARE
Goal Outcome Evaluation:      Overall Patient Progress: no change      Observation goals  PRIOR TO DISCHARGE        Comments:     -Diagnostic tests and consults completed and resulted-Met    -Vital signs normal or at patient baseline-Met    -Returns to baseline functional status-Met    -Safe disposition plan has been identified-Met    Nurse to notify provider when observation goals have been met and patient is ready for discharge.          Orientation/Cognitive:Alert, UTD orientation, non verbal  Observation Goals (Met/ Not Met): Met  Mobility Level/Assist Equipment: AX2 , lift  Fall Risk (Y/N): Y  Behavior Concerns: None  Pain Management: No sign of pain noted, pt unable to self report pain  Tele/VS/O2: VSS on RA  ABNL Lab/BG: None this shift, BMP drawn, result pending  Diet: NPO, Jevity 1.5 tube feeds continuous@55mls per hour with 30mls water flushes Q 4hrs  Bowel/Bladder: Incontinent B&B, larhe hard formed BMs x2 this shift  Skin Concerns: dry flaky skin, blanchable redness coccyx, sacral mepilex changed this morning  Drains/Devices: Male external catherter, G/J tube with continuous feeding, 2 PIVs on lt arm, one s/l ,the other with N/S w K+ cont@100  Tests/Procedures for next shift: Repeat BMP  Anticipated DC date & active delays: Possibly home today, CC following, mom very involved  Patient Stated Goal for Today: None stated

## 2023-08-09 NOTE — CODE/RAPID RESPONSE
Sepsis Evaluation     I was called to see Keyon Farias due to a change in vital signs. He is not known to have an infection.     On arrival, the patient appears non distressed, awake, alert. Deep suctioning by critical care RN shows tenacious sputum, no significant volume w/ suctioning. Oxygen sats 97% on RA, RR 18. No obvious indicators or pain.     Sepsis (criteria met - tachycardia, fever + suspected source)  Suspected CAP vs Aspiration Pneumonia   Increased RR from baseline, rectal temp 102.3, thick tenacious sputum w/ poor mobility and minimal cough/gag reflex due to underlying TBI. Remains on RA, no distress, but vitals trending the wrong direction. Of note, home meds were restarted this AM   - 1gram PO tylenol    - 1 L LR x 1 over 2 hours  - stop IVF w/ K, fluid resuscitate w/ crystalloid   - strict I&O, urine collection system replaced   - daily weights  - start zosyn/azithro for aspiration and CAP coverage   - change to inpatient   - mucomyst nebs QID to help mobilize secretions.     PHYSICAL EXAM  Vital Signs:  Temp: (!) 102.3  F (39.1  C) Temp src: Rectal BP: 117/72 Pulse: (!) 121   Resp: 26 SpO2: 96 % O2 Device: None (Room air)      General: acutely ill appearing  Mental Status: baseline mental status. Non verbal.     Remainder of physical exam is significant for     Physical Exam  Vitals and nursing note reviewed.   Constitutional:       General: He is not in acute distress.     Appearance: He is ill-appearing. He is not toxic-appearing or diaphoretic.   HENT:      Mouth/Throat:      Mouth: Mucous membranes are dry.   Eyes:      Pupils: Pupils are equal, round, and reactive to light.   Cardiovascular:      Rate and Rhythm: Regular rhythm. Tachycardia present.   Pulmonary:      Effort: Pulmonary effort is normal.      Breath sounds: Rhonchi present.   Abdominal:      General: Abdomen is flat. There is no distension.      Palpations: Abdomen is soft.   Skin:     General: Skin is warm.   Neurological:       "Mental Status: He is alert. Mental status is at baseline.   Psychiatric:         Mood and Affect: Mood normal.         DATA  Lactic Acid   Date Value Ref Range Status   08/07/2023 2.0 0.7 - 2.0 mmol/L Final   04/06/2023 1.2 0.7 - 2.0 mmol/L Final   05/23/2021 2.1 (H) 0.7 - 2.0 mmol/L Final   05/23/2021 3.2 (H) 0.7 - 2.0 mmol/L Final     Lactate for Sepsis Protocol   Date Value Ref Range Status   11/04/2020 1.9 0.7 - 2.0 mmol/L Final   11/01/2020 2.3 (H) 0.7 - 2.0 mmol/L Final     Comment:     Significant value called to and read back by  LAWRENCE CARPIO.ON 6B 11/1/20,2143 BY          ASSESSMENT AND PLAN  Keyon Farias meets SIRS criteria but does NOT have a lactate >2 or other evidence of acute organ damage.  These vital sign, lab and physical exam findings constitute a diagnosis of SEPSIS.         Anti-infectives (From now, onward)      Start     Dose/Rate Route Frequency Ordered Stop    08/10/23 1100  azithromycin (ZITHROMAX) 250 mg in sodium chloride 0.9 % 250 mL intermittent infusion         250 mg  over 1 Hours Intravenous EVERY 24 HOURS 08/09/23 1105 08/14/23 1059    08/09/23 1130  piperacillin-tazobactam (ZOSYN) 3.375 g vial to attach to  mL bag        Note to Pharmacy: For [facility], [facility] and [facility]: For Zosyn-naive patients, use the \"Zosyn initial dose + extended infusion\" order panel.    3.375 g  over 30 Minutes Intravenous EVERY 6 HOURS 08/09/23 1105 08/16/23 1129    08/09/23 1130  azithromycin (ZITHROMAX) 500 mg vial to attach to  mL bag         500 mg  over 1 Hours Intravenous EVERY 24 HOURS 08/09/23 1105 08/10/23 1129          Current antibiotic coverage is appropriate for source of infection.     Disposition: The patient  will be transitioned to inpatient, remain on a medical unit .  Joy Solano, NATHANIEL CNP  08/09/23, 11:05 AM    Sepsis Criteria   Sepsis: The body's generalized inflammatory state as a response to an infection. Sepsis Predictive Model includes >80 variable to alert to potential " sepsis.  Severe Sepsis: Sepsis plus one or more variables of acute organ dysfunction (Note: lactic acid >2 or acute encephalopathy each qualify as organ dysfunction)  Septic Shock: Sepsis AND hypotension despite adequate volume resuscitation with crystalloid or lactic acid >=4  Note: HYPOTENSION is defined as 2 BP readings measured 3 hrs apart that have a SBP <90, MAP <65, or decrease >40 mmHg, occurring 6 hrs before or after t-zero    I spent 48 minutes (1017 - 1105) of critical care time on the unit/floor managing the care of Keyon Farias. Upon evaluation, this patient had a high probability of imminent or life-threatening deterioration due to sepsis which required my direct attention, intervention, and personal management. 100% of my time was spent at the bedside counseling the patient and/or coordinating care regarding services listed in this note.

## 2023-08-09 NOTE — PLAN OF CARE
Goal Outcome Evaluation:      Overall Patient Progress: no change      Observation goals  PRIOR TO DISCHARGE        Comments:     -Diagnostic tests and consults completed and resulted-Met    -Vital signs normal or at patient baseline-Met    -Returns to baseline functional status-Met    -Safe disposition plan has been identified-Met    Nurse to notify provider when observation goals have been met and patient is ready for discharge.

## 2023-08-09 NOTE — PROGRESS NOTES
Orientation/Cognitive: Alert. Nonverbal.   Observation Goals (Met/ Not Met): not met  Mobility Level/Assist Equipment: AX2. T/R. Up with lift.  Fall Risk (Y/N): Yes  Behavior Concerns: none  Pain Management: appears resting comfortable  Tele/VS/O2: VSS on RA ex soft BP. No tele  ABNL Lab/BG: K+ 3.3. Replaced, awaiting result. Creat- 1.25. Urea nitrogen- 29.2  Diet: NPO. Oral care given. GJ Tube feeding running at 55 mL/hr with free water Q 4 hrs 30 mL.  Bowel/Bladder: Incontinent B/B. Incontinence care given. BM this shift. External male catheter in place.  Skin Concerns: Redness blanchable at coccyx. Mapilex in place.   Drains/Devices: NS+AURE infusing 100 mL/hr.  Tests/Procedures for next shift: none  Anticipated DC date & active delays: Possibly discharge home tomorrow if labs stable and afebrile. SW following.   Patient Stated Goal for Today: HECTOR  Observation goals  PRIOR TO DISCHARGE        Comments: -diagnostic tests and consults completed and resulted- not met  -vital signs normal or at patient baseline- met  -returns to baseline functional status- met  -safe disposition plan has been identified- not met. SW following.  Nurse to notify provider when observation goals have been met and patient is ready for discharge.

## 2023-08-10 ENCOUNTER — APPOINTMENT (OUTPATIENT)
Dept: INTERVENTIONAL RADIOLOGY/VASCULAR | Facility: CLINIC | Age: 63
DRG: 871 | End: 2023-08-10
Attending: HOSPITALIST
Payer: MEDICARE

## 2023-08-10 ENCOUNTER — MEDICAL CORRESPONDENCE (OUTPATIENT)
Dept: HEALTH INFORMATION MANAGEMENT | Facility: CLINIC | Age: 63
End: 2023-08-10

## 2023-08-10 LAB
ANION GAP SERPL CALCULATED.3IONS-SCNC: 10 MMOL/L (ref 7–15)
BUN SERPL-MCNC: 12.2 MG/DL (ref 8–23)
CALCIUM SERPL-MCNC: 7.9 MG/DL (ref 8.8–10.2)
CHLORIDE SERPL-SCNC: 108 MMOL/L (ref 98–107)
CREAT SERPL-MCNC: 0.98 MG/DL (ref 0.67–1.17)
DEPRECATED HCO3 PLAS-SCNC: 22 MMOL/L (ref 22–29)
ERYTHROCYTE [DISTWIDTH] IN BLOOD BY AUTOMATED COUNT: 13.7 % (ref 10–15)
GFR SERPL CREATININE-BSD FRML MDRD: 88 ML/MIN/1.73M2
GLUCOSE BLDC GLUCOMTR-MCNC: 107 MG/DL (ref 70–99)
GLUCOSE BLDC GLUCOMTR-MCNC: 111 MG/DL (ref 70–99)
GLUCOSE BLDC GLUCOMTR-MCNC: 162 MG/DL (ref 70–99)
GLUCOSE BLDC GLUCOMTR-MCNC: 212 MG/DL (ref 70–99)
GLUCOSE BLDC GLUCOMTR-MCNC: 97 MG/DL (ref 70–99)
GLUCOSE BLDC GLUCOMTR-MCNC: 97 MG/DL (ref 70–99)
GLUCOSE BLDC GLUCOMTR-MCNC: 99 MG/DL (ref 70–99)
GLUCOSE SERPL-MCNC: 103 MG/DL (ref 70–99)
HCT VFR BLD AUTO: 42.5 % (ref 40–53)
HGB BLD-MCNC: 13.8 G/DL (ref 13.3–17.7)
MCH RBC QN AUTO: 29.3 PG (ref 26.5–33)
MCHC RBC AUTO-ENTMCNC: 32.5 G/DL (ref 31.5–36.5)
MCV RBC AUTO: 90 FL (ref 78–100)
PHOSPHATE SERPL-MCNC: 2.2 MG/DL (ref 2.5–4.5)
PLATELET # BLD AUTO: 112 10E3/UL (ref 150–450)
POTASSIUM SERPL-SCNC: 4.5 MMOL/L (ref 3.4–5.3)
RBC # BLD AUTO: 4.71 10E6/UL (ref 4.4–5.9)
SODIUM SERPL-SCNC: 140 MMOL/L (ref 136–145)
WBC # BLD AUTO: 10.4 10E3/UL (ref 4–11)

## 2023-08-10 PROCEDURE — 250N000011 HC RX IP 250 OP 636: Mod: JZ | Performed by: HOSPITALIST

## 2023-08-10 PROCEDURE — 250N000009 HC RX 250: Performed by: NURSE PRACTITIONER

## 2023-08-10 PROCEDURE — 80048 BASIC METABOLIC PNL TOTAL CA: CPT | Performed by: NURSE PRACTITIONER

## 2023-08-10 PROCEDURE — C1769 GUIDE WIRE: HCPCS

## 2023-08-10 PROCEDURE — 258N000003 HC RX IP 258 OP 636: Performed by: NURSE PRACTITIONER

## 2023-08-10 PROCEDURE — 250N000013 HC RX MED GY IP 250 OP 250 PS 637: Performed by: INTERNAL MEDICINE

## 2023-08-10 PROCEDURE — 120N000001 HC R&B MED SURG/OB

## 2023-08-10 PROCEDURE — 250N000011 HC RX IP 250 OP 636: Performed by: NURSE PRACTITIONER

## 2023-08-10 PROCEDURE — 99233 SBSQ HOSP IP/OBS HIGH 50: CPT | Performed by: HOSPITALIST

## 2023-08-10 PROCEDURE — 36415 COLL VENOUS BLD VENIPUNCTURE: CPT | Performed by: INTERNAL MEDICINE

## 2023-08-10 PROCEDURE — 85027 COMPLETE CBC AUTOMATED: CPT | Performed by: INTERNAL MEDICINE

## 2023-08-10 PROCEDURE — 258N000003 HC RX IP 258 OP 636: Performed by: HOSPITALIST

## 2023-08-10 PROCEDURE — 84100 ASSAY OF PHOSPHORUS: CPT | Performed by: HOSPITALIST

## 2023-08-10 PROCEDURE — 999N000128 HC STATISTIC PERIPHERAL IV START W/O US GUIDANCE

## 2023-08-10 PROCEDURE — 999N000157 HC STATISTIC RCP TIME EA 10 MIN

## 2023-08-10 PROCEDURE — 49452 REPLACE G-J TUBE PERC: CPT

## 2023-08-10 PROCEDURE — 250N000009 HC RX 250: Performed by: HOSPITALIST

## 2023-08-10 RX ORDER — SODIUM CHLORIDE 9 MG/ML
INJECTION, SOLUTION INTRAVENOUS CONTINUOUS
Status: ACTIVE | OUTPATIENT
Start: 2023-08-10 | End: 2023-08-11

## 2023-08-10 RX ADMIN — Medication 20 MG: at 06:54

## 2023-08-10 RX ADMIN — SODIUM BICARBONATE 650 MG TABLET 650 MG: at 19:58

## 2023-08-10 RX ADMIN — CARBAMAZEPINE 100 MG: 100 SUSPENSION ORAL at 17:58

## 2023-08-10 RX ADMIN — LEVALBUTEROL HYDROCHLORIDE 1.25 MG: 1.25 SOLUTION RESPIRATORY (INHALATION) at 15:46

## 2023-08-10 RX ADMIN — LEVALBUTEROL HYDROCHLORIDE 1.25 MG: 1.25 SOLUTION RESPIRATORY (INHALATION) at 19:49

## 2023-08-10 RX ADMIN — ACETYLCYSTEINE 2 ML: 200 INHALANT RESPIRATORY (INHALATION) at 15:46

## 2023-08-10 RX ADMIN — PIPERACILLIN AND TAZOBACTAM 3.38 G: 3; .375 INJECTION, POWDER, FOR SOLUTION INTRAVENOUS at 00:13

## 2023-08-10 RX ADMIN — LEVALBUTEROL HYDROCHLORIDE 1.25 MG: 1.25 SOLUTION RESPIRATORY (INHALATION) at 07:47

## 2023-08-10 RX ADMIN — METOCLOPRAMIDE HYDROCHLORIDE 10 MG: 5 SOLUTION ORAL at 17:45

## 2023-08-10 RX ADMIN — BRIVARACETAM 100 MG: 10 SOLUTION ORAL at 09:34

## 2023-08-10 RX ADMIN — CARBAMAZEPINE 150 MG: 100 SUSPENSION ORAL at 00:19

## 2023-08-10 RX ADMIN — ACETYLCYSTEINE 2 ML: 200 INHALANT RESPIRATORY (INHALATION) at 19:49

## 2023-08-10 RX ADMIN — CARBAMAZEPINE 150 MG: 100 SUSPENSION ORAL at 05:50

## 2023-08-10 RX ADMIN — CARBAMAZEPINE 150 MG: 100 SUSPENSION ORAL at 13:26

## 2023-08-10 RX ADMIN — SODIUM BICARBONATE 650 MG TABLET 650 MG: at 09:34

## 2023-08-10 RX ADMIN — SODIUM CHLORIDE: 9 INJECTION, SOLUTION INTRAVENOUS at 09:35

## 2023-08-10 RX ADMIN — AZITHROMYCIN MONOHYDRATE 250 MG: 500 INJECTION, POWDER, LYOPHILIZED, FOR SOLUTION INTRAVENOUS at 11:10

## 2023-08-10 RX ADMIN — METOCLOPRAMIDE HYDROCHLORIDE 10 MG: 5 SOLUTION ORAL at 19:58

## 2023-08-10 RX ADMIN — METOCLOPRAMIDE HYDROCHLORIDE 10 MG: 5 SOLUTION ORAL at 13:26

## 2023-08-10 RX ADMIN — HYDROCORTISONE SODIUM SUCCINATE 50 MG: 100 INJECTION, POWDER, FOR SOLUTION INTRAMUSCULAR; INTRAVENOUS at 17:45

## 2023-08-10 RX ADMIN — PIPERACILLIN AND TAZOBACTAM 3.38 G: 3; .375 INJECTION, POWDER, FOR SOLUTION INTRAVENOUS at 17:45

## 2023-08-10 RX ADMIN — LEVOTHYROXINE SODIUM 150 MCG: 75 TABLET ORAL at 09:35

## 2023-08-10 RX ADMIN — BRIVARACETAM 100 MG: 10 SOLUTION ORAL at 21:43

## 2023-08-10 RX ADMIN — SODIUM PHOSPHATE, MONOBASIC, MONOHYDRATE AND SODIUM PHOSPHATE, DIBASIC, ANHYDROUS 9 MMOL: 142; 276 INJECTION, SOLUTION INTRAVENOUS at 06:49

## 2023-08-10 RX ADMIN — HYDROCORTISONE SODIUM SUCCINATE 50 MG: 100 INJECTION, POWDER, FOR SOLUTION INTRAMUSCULAR; INTRAVENOUS at 09:35

## 2023-08-10 RX ADMIN — PIPERACILLIN AND TAZOBACTAM 3.38 G: 3; .375 INJECTION, POWDER, FOR SOLUTION INTRAVENOUS at 15:01

## 2023-08-10 RX ADMIN — PIPERACILLIN AND TAZOBACTAM 3.38 G: 3; .375 INJECTION, POWDER, FOR SOLUTION INTRAVENOUS at 05:46

## 2023-08-10 RX ADMIN — ACETYLCYSTEINE 2 ML: 200 INHALANT RESPIRATORY (INHALATION) at 07:47

## 2023-08-10 RX ADMIN — METOCLOPRAMIDE HYDROCHLORIDE 10 MG: 5 SOLUTION ORAL at 09:34

## 2023-08-10 ASSESSMENT — ACTIVITIES OF DAILY LIVING (ADL)
ADLS_ACUITY_SCORE: 79

## 2023-08-10 NOTE — PLAN OF CARE
Goal Outcome Evaluation:       DATE & TIME: 8/9/23-8/10/23 [id number]  Cognitive Concerns/ Orientation : Alert, HECTOR orientation - pt. non verbal at baseline   BEHAVIOR & AGGRESSION TOOL COLOR: green  CIWA SCORE: NA   ABNL VS/O2: Soft BP, other VSS on RA  MOBILITY: Ax2/total assist, T/R Q2Hr  PAIN MANAGMENT: appears comfortable  DIET: NPO except ice chips.   BOWEL/BLADDER: incontinent of BB. Had 1 medium soft BM this shift  ABNL LAB/BG: Ph 1.4 ,   DRAIN/DEVICES: 2 L. PIVs   TELEMETRY RHYTHM: NA  SKIN: redness blanchable on perineum/buttocks/sacrum, abrasions on L arm and thigh and R. knee  TESTS/PROCEDURES: none  D/C DAY/GOALS/PLACE: pending   OTHER IMPORTANT INFO: J tube clogged upon arrival to unit, orders to hold TF overnight with BS checks - notify on-call if hypoglycemic. G tube is patent for med administration.

## 2023-08-10 NOTE — IR NOTE
Interventional Radiology Intra-procedural Nursing Note    Patient Name: Keyon Farias  Medical Record Number: [id number]  Today's Date: August 10, 2023    Procedure: Gastrojejunostomy exchange   Start time: 1220  End time: 1226  Report provided to:  larry Diaz RN  P    Note: Patient entered Interventional Radiology Suite number 1 via cart. Patient awake, alert and oriented. Assisted onto procedural table in Supine position. Prepped and draped.  Dr. teixeira in room. Time out and procedure started. Vital signs stable.     Procedure well tolerated by patient without complications. Procedure end with debrief by Dr. Teixeira.  Gauze dressing applied to G-J Site.    Administered medication totals:    No Sedation given for G-J exchange

## 2023-08-10 NOTE — PROGRESS NOTES
[facility]  Hospitalist Progress Note        Helio Galan MD   08/10/2023        Interval History:        - was evaluated by RRT on 8/9 for increased work of breathing, tachycardia and fever of 102.3, started on empiric Zosyn/azithromycin for likely aspiration pneumonia; also started on Mucomyst nebs QID to help mobilize secretions  - CXR 8/9 noted linear and patchy opacities in the lung bases, right greater than left, could represent pneumonia and/or atelectasis. No pleural effusion or pneumothorax.   - J tube clogged and tube feeds held overnight-- will consult IR  - soft BP SBP 90s, tachycardia better; T max 102.3, trending downl leukocytosis normalized WBC 11.9---> 10.4  - will hold off on PTA PO Hydrocortisone and will order 3 doses of stress dose IV Hydrocortisone 50 mg IV q 8 hrs; will start IVF with NS at 75 ml/hr X 24 hrs , can probably discontinue once resumed on tube feeds  - renal function improved with creatinine 1.65---> 0.98  - blood cultures from 8/7 and 8/9 with no growth so far         Assessment and Plan:        Keyon Farias is a 62 year old male with PMHx of TBI with aphasia, R sided spastic hemiplegia, chronic hemiplegia and chronic dysphagia with GJ-tube for TFs/meds, seizure disorder, panhypopituitarism and hx of prior hospitalizations for recurrent pneumonia who was admitted under observation status on 8/7/2023 for evaluation of fever and increased daytime somnolence as well as acute kidney injury and hypokalemia.       Fever of unclear etiology  likely aspiration pneumonia/pneumonitis  Generalized weakness  * Has hx of several prior hospitalizations for aspiration pneumonitis. Last hospital stay was in 11/0223.   * Reportedly febrile at home with TMax 100.5, with increased daytime somnolence.   * In ED, was afebrile, mildly tachycardic (HR 110s) but BPs stable. Initially required O2 but was quickly weaned off and O2 sats stable on RA since. WBC 11.9, procal 0.1 (low  risk), lactate nl. COVID, RSV and influenza swabs were neg. CXR with bibasilar subsegmental atelectasis but no focal infiltrate. UA bland and not suggestive of UTI. Empiric abx held on admission. Placed on IVFs  -- initially remained afebrile off abx, WBC quickly normalized    - was evaluated by RRT on 8/9 for increased work of breathing, tachycardia and fever of 102.3, started on empiric Zosyn/azithromycin for likely aspiration pneumonia; also started on Mucomyst nebs QID to help mobilize secretions  - CXR 8/9 noted linear and patchy opacities in the lung bases, right greater than left, could represent pneumonia and/or atelectasis. No pleural effusion or pneumothorax.   - Respiratory status currently stable on room air with some desaturations with sleep  - soft BP SBP 90s, tachycardia better; T max 102.3, trending downl leukocytosis normalized WBC 11.9---> 10.4  - will hold off on PTA PO Hydrocortisone and will order 3 doses of stress dose IV Hydrocortisone 50 mg IV q 8 hrs  - will start IVF with NS at 75 ml/hr X 24 hrs , can probably discontinue once resumed on tube feeds  - blood cultures from 8/7 and 8/9 with no growth so far     Acute kidney injury: Improved  Dehydration  Hypokalemia: Improved  * Labs on admission notable for K 3.0, Cr 1.65 (baseline 0.8-1.0). IVFs with KCl started on admisson  - renal function improved with creatinine 1.65---> 0.98  - IVF as noted above     Spastic hemiplegia  Traumatic brain injury  Nonverbal  Chronic right-sided paresis  Chronic dysphagia, s/p GJ tube  Clogged J tube  * Bed-bound and essentially nonverbal at baseline. Takes meds via G-tube. 5 cans Jevity/d via Gtube.  * Chronic and stable on home meds, including Tegretol and Brivaracetam  * Nutritionist consulted for TFs this stay.   - J tube clogged and tube feeds held overnight-- will consult IR (8/10/23)     Panhypopituitarism  Hypothyroidism  * Chronic and stable on home meds, including hydrocortisone and Synthroid   -  plan for stress dose steroids as noted above     GERD  * Chronic and stable on PPI    DVT Prophylaxis: PCDs  Code Status: Full Code     Disposition:   - likely 1-2 days pending clinical stability     Diet:   NPO for Medical/Clinical Reasons Except for: Ice Chips  Adult Formula Drip Feeding: Continuous Jevity 1.5; Jejunostomy; Goal Rate: 55; mL/hr; Resume TF of Jevity 1.5 at 55 mL/hr  Diet      Clinically Significant Risk Factors Present on Admission                # Thrombocytopenia: Lowest platelets = 112 in last 2 days, will monitor for bleeding                    Page Me (7 am to 6 pm)    Care plan discussed with nursing, patient and his mother over the phone    High medical complexity              Physical Exam:      Blood pressure 92/51, pulse 103, temperature 98.3  F (36.8  C), temperature source Axillary, resp. rate 18, height 1.829 m (6'), weight 78.9 kg (173 lb 15.1 oz), SpO2 94 %.  Vitals:    08/08/23 0900 08/09/23 0718 08/09/23 1540   Weight: 73.4 kg (161 lb 13.1 oz) 74.5 kg (164 lb 3.9 oz) 78.9 kg (173 lb 15.1 oz)     Vital Signs with Ranges  Temp:  [98.3  F (36.8  C)-102.3  F (39.1  C)] 98.3  F (36.8  C)  Pulse:  [] 103  Resp:  [18-26] 18  BP: ()/(43-72) 92/51  SpO2:  [92 %-96 %] 94 %  I/O's Last 24 hours  I/O last 3 completed shifts:  In: 240 [NG/GT:240]  Out: 250 [Urine:250]    Constitutional: conscious, alert, awake, mostly nonverbal but follows simple commands and interacts with yes/no, head nodding, thumbs up/down; resting comfortably in no apparent distress   HEENT: Pupils equal and reactive to light and accomodation,   Oral cavity: dry oral mucosa with poor oral hygiene   Cardiovascular: Normal s1 s2, regular rate and rhythm , no murmur   Lungs: B/l clear to auscultation, no wheezes or crepitations   Abdomen: Soft, nontender, nondistended.  feeding tube is in place.  No guarding, rigidity or rebound tenderness   LE : No edema   Musculoskeletal: has spastic hemiplegia on the right and  is mostly nonverbal   Neuro:     Psychiatry: normal mood and affect                Medications:         acetylcysteine  2 mL Nebulization Q4H    azithromycin  250 mg Intravenous Q24H    Brivaracetam  100 mg Oral or Feeding Tube BID    carBAMazepine  100 mg Oral Daily    carBAMazepine  150 mg Oral or Feeding Tube TID    hydrocortisone  7.5 mg Oral Daily    hydrocortisone  15 mg Oral Daily    lactated ringers  1,000 mL Intravenous Once    levothyroxine  150 mcg Oral Daily    metoclopramide  10 mg Oral 4x Daily AC & HS    pantoprazole  20 mg Per Feeding Tube QAM AC    piperacillin-tazobactam  3.375 g Intravenous Q6H    sodium bicarbonate  650 mg Oral BID    sodium chloride (PF)  3 mL Intracatheter Q8H    sodium chloride (PF)  3 mL Intracatheter Q8H    sodium phosphate  9 mmol Intravenous Once     PRN Meds: acetaminophen, dextrose, glycopyrrolate, guaiFENesin, levalbuterol, melatonin, ondansetron **OR** ondansetron, sodium chloride (PF), sodium chloride (PF)         Data:      All new lab and imaging data was reviewed.   Recent Labs   Lab Test 08/10/23  0444 08/09/23  1138 08/08/23  0713 11/20/22  2053 07/05/22  0035 06/18/22  0327 06/16/22  2248 05/09/22  1122 12/27/21  1630   WBC 10.4 11.3* 7.8   < > 8.4   < >  --    < > 9.6   HGB 13.8 14.6 14.1   < > 12.2*   < >  --    < > 14.0   MCV 90 91 89   < > 89   < >  --    < > 93   * 149* 145*   < > 264   < >  --    < > 200   INR  --   --   --   --  1.15  --  1.22*  --  1.28*    < > = values in this interval not displayed.      Recent Labs   Lab Test 08/10/23  0455 08/10/23  0444 08/10/23  0019 08/09/23  0837 08/09/23  0657 08/08/23  2239 08/08/23  2224 08/08/23  0646   NA  --  140  --   --  138  --   --  140   POTASSIUM  --  4.5  --   --  4.3  --  4.6 3.3*   CHLORIDE  --  108*  --   --  106  --   --  95*   CO2  --  22  --   --  23  --   --  32*   BUN  --  12.2  --   --  15.6  --   --  29.2*   CR  --  0.98  --   --  0.83  --   --  1.25*   ANIONGAP  --  10  --   --  9   --   --  13   STEVE  --  7.9*  --   --  8.1*  --   --  8.3*   GLC 97 103* 111*   < > 158*   < >  --  108*    < > = values in this interval not displayed.     Recent Labs   Lab Test 04/15/21  2250 02/19/21  1123 10/25/20  2100   TROPI <0.015 <0.015 0.047*

## 2023-08-10 NOTE — CONSULTS
"  Interventional Radiology - Progress Note  Inpatient - [facility]  8/10/2023    IR Brief Note    IR consulted for GJ tube exchange due to clogged J port. G Port patent. Last exchange on 5/1/23:    \"IMPRESSION: Successful replacement of 18 [place] TORY gastrojejunostomy  tube. The tube is ready for immediate use\"    Scheduled for today.    Rikki Marx PA-C  Interventional Radiology  [phone] (IR)  *[phone] ([facility] Office)    "

## 2023-08-10 NOTE — PROGRESS NOTES
Goal Outcome Evaluation:       DATE & TIME: 8/9/23-8/10/23 night.  Cognitive Concerns/ Orientation : Alert, speech not clear.  BEHAVIOR & AGGRESSION TOOL COLOR: green  ABNL VS/O2: Soft BP 92/51,  other VSS on RA.  MOBILITY: Ax2/total assist, T/R Q2Hr  PAIN MANAGMENT: Does not express pain.  DIET: NPO except ice chips.   BOWEL/BLADDER: incontinent of B&B. Had 1 large soft BM this shift  ABNL LAB/BG: Phos 2.2 (2.5-4.5, replaced recheck 0600 on 08/11/23), , 97  DRAIN/DEVICES: 2 L. PIVs   SKIN: redness blanchable on perineum/buttocks/sacrum mepilex  applied, abrasions on L arm and thigh and R. Knee. Scratches scattered.   TESTS/PROCEDURES: none  D/C DAY/GOALS/PLACE: pending   OTHER IMPORTANT INFO: J tube clogged upon arrival to unit, orders to hold TF overnight with BS checks - notify on-call if hypoglycemic. G tube is patent for med administration.

## 2023-08-10 NOTE — PLAN OF CARE
Summary: Pneumonia  Date & Time: 4385-7836 8/10  Orientation: A&Ox2, non verbal, dysphagia   Activity Level: Assist of 2 total  Fall Risk: Yes  Behavior & Aggression: Green  Diet: NPO  Bowel/Bladder: incontinent   Drains/Devices: 2 L PIV with NS running on L hand PIV @ 75 ml/hr, J tube running @ 60ml/hr   Skin: Blanchable redness on perineum, buttocks, and coccyx. Scattered scratches   ABNL VS/O2: VSS on RA ex BP soft   ABNL Lab/BG: Phos 2.2  Anticipated  DC Date: Pending

## 2023-08-10 NOTE — PRE-PROCEDURE
GENERAL PRE-PROCEDURE:   Procedure:  Gastrojejunostomy exchange  Date/Time:  8/10/2023 8:54 AM    Written consent obtained?: Yes    Risks and benefits: Risks, benefits and alternatives were discussed    Consent given by:  Parent  Patient states understanding of procedure being performed: Yes    Patient's understanding of procedure matches consent: Yes    Procedure consent matches procedure scheduled: Yes    History & Physical reviewed:  History and physical reviewed and no updates needed  Statement of review:  I have reviewed the lab findings, diagnostic data, medications, and the plan for sedation    Rikki Marx PA-C  Interventional Radiology  [phone] (IR)  *[phone] ([facility] Office)

## 2023-08-10 NOTE — PROGRESS NOTES
CLINICAL NUTRITION SERVICES  -  ASSESSMENT NOTE    Recommendations Ordered by Registered Dietitian (RD):   Patient back on levothyroxine so will update TF regimen to the following:  - Jevity 1.5 at 60 mL/hr x 22 hours per day (holding TF 1 hour before and 1 hour after Synthroid dose in AM) - 1980 kcal (27 kcal/kg), 84 g protein (1.2 g/kg), 28 g fiber, 1003 mL H2O  Flushes 30 mL every 4 hours while on MIVF   Malnutrition:   % Weight Loss:  None noted, wt's trending up  % Intake:  Unable to assess  Subcutaneous Fat Loss:  Unable to assess  Muscle Loss:  Unable to assess  Fluid Retention:  None noted    Malnutrition Diagnosis: Unable to determine due to lack of NFPE, nutrition history     REASON FOR ASSESSMENT  Keyon Farias is a 62 year old male seen by Registered Dietitian for full assessment note after flipping inpatient. Was started on TF 8/8 (see RD note from 8/8 for more detail on TF start).    PMH of TBI with aphasia, R sided spastic hemiplegia, chronic hemiplegia and chronic dysphagia with GJ-tube for TFs/meds, seizure disorder, panhypopituitarism and hx of prior hospitalizations for recurrent pneumonia. Presents for evaluation of fevere, daytime somnolence, ALMAS and hypokalemia.    NUTRITION HISTORY    - Chart reviewed, patient is very well known to our services d/t frequent hospitalizations. He has an existing G-J tube and receives home TF   - Per RD brief note on 8/8, Patient's weight typically runs in the 150-160# range   Patient was most recently receiving the following TF regimen (11/2022) -->   Jevity 1.5 at 60 mL/hr x 22 hours per day (holding TF 1 hour before and 1 hour after Synthroid dose in AM)- 1980 kcal (27 kcal/kg), 84 g protein (1.2 g/kg), 28 g fiber, 1003 mL H2O  FWF- 100 mL q 4 hours    - Jtube clogged last night, feedings were put on hold. Gastrojejunostomy exchange this AM. Patient in IR.     CURRENT NUTRITION ORDERS  Diet Order: NPO     Current Intake/Tolerance:  Current nutrition support  "regimen: (not on levothyroxine 8/8)  Jevity 1.5 at 55 mL/hr x 24 hours to provide:  1980 kcal (27 kcal/kg), 84 g protein (1.1 g/kg), 28 g fiber, 1003 mL H2O  Flushes 30 mL every 4 hours while on MIVF    NUTRITION FOCUSED PHYSICAL ASSESSMENT FOR DIAGNOSING MALNUTRITION)  No:  Patient not available           ANTHROPOMETRICS  Height: 6' 0\"  Weight: 73.4 kg - lowest weight on admission  BMI: 21.8  Weight Status:  Normal BMI  IBW: 80.9 kg  % IBW: 90%  Weight History: wt seems to be trending up  Wt Readings from Last 10 Encounters:   08/09/23 78.9 kg (173 lb 15.1 oz)   05/10/23 72.6 kg (160 lb)   04/06/23 79.4 kg (175 lb)   03/10/23 73.9 kg (163 lb)   02/15/23 73.9 kg (163 lb)   01/23/23 63.5 kg (140 lb)   01/09/23 63.5 kg (140 lb)   11/28/22 64.4 kg (141 lb 14.4 oz)   06/27/22 69 kg (152 lb 1.9 oz)   06/07/22 71 kg (156 lb 8 oz)     LABS  Phos 2.2 (L)    MEDICATIONS  Levothyroxine, protonix, IVF @ 75 mL/hr    ASSESSED NUTRITION NEEDS PER APPROVED PRACTICE GUIDELINES:  Dosing Weight 73.4 kg   Estimated Energy Needs: 4997-8054 kcals (25-30 Kcal/Kg)  Justification: maintenance  Estimated Protein Needs:  grams protein (1.2-1.5 g pro/Kg)  Justification: maintenance  Estimated Fluid Needs: 1 mL/kcal   Justification: maintenance    NUTRITION DIAGNOSIS:  Inadequate protein-energy intake related to feedings held in the last 24 hours as evidenced by feedings stopped d/t Jtube clogged, need for Gastrojejunostomy exchange this AM, currently NPO status    NUTRITION INTERVENTIONS  Recommendations / Nutrition Prescription  EN modified (see above)    Implementation  Nutrition education: Per Provider order if indicated   EN Composition - modified    Nutrition Goals  EN to meet % of estimated nutritional needs    MONITORING AND EVALUATION:  Progress towards goals will be monitored and evaluated per protocol and Practice Guidelines    Sun Abad, RD, LD  Clinical Dietitian - [facility]    "

## 2023-08-11 LAB
GLUCOSE BLDC GLUCOMTR-MCNC: 145 MG/DL (ref 70–99)
GLUCOSE BLDC GLUCOMTR-MCNC: 154 MG/DL (ref 70–99)
GLUCOSE BLDC GLUCOMTR-MCNC: 171 MG/DL (ref 70–99)
GLUCOSE BLDC GLUCOMTR-MCNC: 255 MG/DL (ref 70–99)
GLUCOSE BLDC GLUCOMTR-MCNC: 95 MG/DL (ref 70–99)
PHOSPHATE SERPL-MCNC: 1 MG/DL (ref 2.5–4.5)
PHOSPHATE SERPL-MCNC: 2.5 MG/DL (ref 2.5–4.5)
POTASSIUM SERPL-SCNC: 4 MMOL/L (ref 3.4–5.3)

## 2023-08-11 PROCEDURE — 250N000013 HC RX MED GY IP 250 OP 250 PS 637: Performed by: INTERNAL MEDICINE

## 2023-08-11 PROCEDURE — 120N000001 HC R&B MED SURG/OB

## 2023-08-11 PROCEDURE — 258N000003 HC RX IP 258 OP 636: Performed by: HOSPITALIST

## 2023-08-11 PROCEDURE — 36415 COLL VENOUS BLD VENIPUNCTURE: CPT | Performed by: HOSPITALIST

## 2023-08-11 PROCEDURE — 84100 ASSAY OF PHOSPHORUS: CPT | Performed by: HOSPITALIST

## 2023-08-11 PROCEDURE — 250N000011 HC RX IP 250 OP 636: Performed by: NURSE PRACTITIONER

## 2023-08-11 PROCEDURE — 250N000011 HC RX IP 250 OP 636: Mod: JZ | Performed by: HOSPITALIST

## 2023-08-11 PROCEDURE — 0D2DXUZ CHANGE FEEDING DEVICE IN LOWER INTESTINAL TRACT, EXTERNAL APPROACH: ICD-10-PCS | Performed by: RADIOLOGY

## 2023-08-11 PROCEDURE — 258N000003 HC RX IP 258 OP 636: Performed by: NURSE PRACTITIONER

## 2023-08-11 PROCEDURE — 84132 ASSAY OF SERUM POTASSIUM: CPT | Performed by: HOSPITALIST

## 2023-08-11 PROCEDURE — 250N000009 HC RX 250: Performed by: HOSPITALIST

## 2023-08-11 PROCEDURE — 99232 SBSQ HOSP IP/OBS MODERATE 35: CPT | Performed by: HOSPITALIST

## 2023-08-11 RX ADMIN — CARBAMAZEPINE 150 MG: 100 SUSPENSION ORAL at 02:20

## 2023-08-11 RX ADMIN — CARBAMAZEPINE 150 MG: 100 SUSPENSION ORAL at 11:33

## 2023-08-11 RX ADMIN — PIPERACILLIN AND TAZOBACTAM 3.38 G: 3; .375 INJECTION, POWDER, FOR SOLUTION INTRAVENOUS at 10:43

## 2023-08-11 RX ADMIN — SODIUM PHOSPHATE, MONOBASIC, MONOHYDRATE AND SODIUM PHOSPHATE, DIBASIC, ANHYDROUS 15 MMOL: 142; 276 INJECTION, SOLUTION INTRAVENOUS at 12:02

## 2023-08-11 RX ADMIN — AZITHROMYCIN MONOHYDRATE 250 MG: 500 INJECTION, POWDER, LYOPHILIZED, FOR SOLUTION INTRAVENOUS at 10:49

## 2023-08-11 RX ADMIN — CARBAMAZEPINE 100 MG: 100 SUSPENSION ORAL at 17:15

## 2023-08-11 RX ADMIN — METOCLOPRAMIDE HYDROCHLORIDE 10 MG: 5 SOLUTION ORAL at 21:05

## 2023-08-11 RX ADMIN — Medication 20 MG: at 07:12

## 2023-08-11 RX ADMIN — LEVOTHYROXINE SODIUM 150 MCG: 75 TABLET ORAL at 09:06

## 2023-08-11 RX ADMIN — PIPERACILLIN AND TAZOBACTAM 3.38 G: 3; .375 INJECTION, POWDER, FOR SOLUTION INTRAVENOUS at 17:11

## 2023-08-11 RX ADMIN — PIPERACILLIN AND TAZOBACTAM 3.38 G: 3; .375 INJECTION, POWDER, FOR SOLUTION INTRAVENOUS at 05:37

## 2023-08-11 RX ADMIN — HYDROCORTISONE SODIUM SUCCINATE 50 MG: 100 INJECTION, POWDER, FOR SOLUTION INTRAMUSCULAR; INTRAVENOUS at 00:10

## 2023-08-11 RX ADMIN — SODIUM BICARBONATE 650 MG TABLET 650 MG: at 21:05

## 2023-08-11 RX ADMIN — CARBAMAZEPINE 150 MG: 100 SUSPENSION ORAL at 09:07

## 2023-08-11 RX ADMIN — METOCLOPRAMIDE HYDROCHLORIDE 10 MG: 5 SOLUTION ORAL at 11:33

## 2023-08-11 RX ADMIN — HYDROCORTISONE 7.5 MG: 5 TABLET ORAL at 13:25

## 2023-08-11 RX ADMIN — BRIVARACETAM 100 MG: 10 SOLUTION ORAL at 11:13

## 2023-08-11 RX ADMIN — BRIVARACETAM 100 MG: 10 SOLUTION ORAL at 21:05

## 2023-08-11 RX ADMIN — SODIUM BICARBONATE 650 MG TABLET 650 MG: at 09:06

## 2023-08-11 RX ADMIN — METOCLOPRAMIDE HYDROCHLORIDE 10 MG: 5 SOLUTION ORAL at 09:06

## 2023-08-11 RX ADMIN — PIPERACILLIN AND TAZOBACTAM 3.38 G: 3; .375 INJECTION, POWDER, FOR SOLUTION INTRAVENOUS at 00:11

## 2023-08-11 RX ADMIN — PIPERACILLIN AND TAZOBACTAM 3.38 G: 3; .375 INJECTION, POWDER, FOR SOLUTION INTRAVENOUS at 22:53

## 2023-08-11 RX ADMIN — METOCLOPRAMIDE HYDROCHLORIDE 10 MG: 5 SOLUTION ORAL at 16:10

## 2023-08-11 RX ADMIN — SODIUM PHOSPHATE, MONOBASIC, MONOHYDRATE AND SODIUM PHOSPHATE, DIBASIC, ANHYDROUS 15 MMOL: 142; 276 INJECTION, SOLUTION INTRAVENOUS at 14:42

## 2023-08-11 ASSESSMENT — ACTIVITIES OF DAILY LIVING (ADL)
ADLS_ACUITY_SCORE: 79

## 2023-08-11 NOTE — PROGRESS NOTES
[facility]  Hospitalist Progress Note        Helio Galan MD   08/11/2023        Interval History:        - J tube changed by IR on 8/10/23 and tube feed resumed   - will discontinue IV fluids (8/11)  - patient has been refusing nebs treatments; respiratory status stable on room air  - has been afebrile since 8/10; will continue empiric Zosyn and azithromycin with plans to switch to Augmentin through J tube to complete 7 to 10 days antibiotic course  - phosphorus markedly low at 1.0, supplement per protocol  - completed stress dose steroids, BP stable; will resume PTA PO hydrocortisone         Assessment and Plan:        Keyon Farias is a 62 year old male with PMHx of TBI with aphasia, R sided spastic hemiplegia, chronic hemiplegia and chronic dysphagia with GJ-tube for TFs/meds, seizure disorder, panhypopituitarism and hx of prior hospitalizations for recurrent pneumonia who was admitted under observation status on 8/7/2023 for evaluation of fever and increased daytime somnolence as well as acute kidney injury and hypokalemia.       Fever of unclear etiology  likely aspiration pneumonia/pneumonitis  Generalized weakness  * Has hx of several prior hospitalizations for aspiration pneumonitis. Last hospital stay was in 11/0223.   * Reportedly febrile at home with TMax 100.5, with increased daytime somnolence.   * In ED, was afebrile, mildly tachycardic (HR 110s) but BPs stable. Initially required O2 but was quickly weaned off and O2 sats stable on RA since. WBC 11.9, procal 0.1 (low risk), lactate nl. COVID, RSV and influenza swabs were neg. CXR with bibasilar subsegmental atelectasis but no focal infiltrate. UA bland and not suggestive of UTI. Empiric abx held on admission. Placed on IVFs  -- initially remained afebrile off abx, WBC quickly normalized    - was evaluated by RRT on 8/9 for increased work of breathing, tachycardia and fever of 102.3, started on empiric Zosyn/azithromycin for  likely aspiration pneumonia; also started on Mucomyst nebs QID to help mobilize secretions  - CXR 8/9 noted linear and patchy opacities in the lung bases, right greater than left, could represent pneumonia and/or atelectasis. No pleural effusion or pneumothorax.   - Respiratory status currently stable on room air   - patient has been refusing nebs treatments; respiratory status stable on room air  -was febrile to 102.3 on 8/9/23 ; has been afebrile since 8/10; will continue empiric Zosyn and azithromycin with plans to switch to Augmentin to complete 7 to 10 days antibiotic course  - blood cultures from 8/7 and 8/9 with no growth so far     Acute kidney injury: Improved  Dehydration  Hypokalemia: Improved  * Labs on admission notable for K 3.0, Cr 1.65 (baseline 0.8-1.0). IVFs with KCl started on admisson  - renal function improved with creatinine 1.65---> 0.98  - got IVF on 8/10 for hypotension; IVF d/kevin 8/11      Spastic hemiplegia  Traumatic brain injury  Nonverbal  Chronic right-sided paresis  Chronic dysphagia, s/p GJ tube  Clogged J tube  * Bed-bound and essentially nonverbal at baseline. Takes meds via G-tube. 5 cans Jevity/d via Gtube.  * Chronic and stable on home meds, including Tegretol and Brivaracetam  * Nutritionist consulted for TFs this stay.   - J tube clogged and exchanged by IR on 8/10/23 and tube feed resumed      Panhypopituitarism  Hypothyroidism  * Chronic and stable on home meds, including hydrocortisone and Synthroid   - was given stress dose steroids with IV hydrocortisone 50 mg Q8 hours X 3 doses  - BP stable; will resume PTA PO hydrocortisone (8/11/23)     GERD  * Chronic and stable on PPI    DVT Prophylaxis: PCDs  Code Status: Full Code     Disposition:   - likely 8/12/23 to home if clinically stable     Diet:   NPO for Medical/Clinical Reasons Except for: Ice Chips  Diet  Adult Formula Drip Feeding: Continuous Jevity 1.5; Jejunostomy; Goal Rate: 60; mL/hr; increase rate to 60 mL/hr now.  hold for 1 hour before/after levothyroxine dose      Clinically Significant Risk Factors Present on Admission                  # Thrombocytopenia: Lowest platelets = 112 in last 2 days, will monitor for bleeding                      Page Me (7 am to 6 pm)    Care plan discussed with patient and his mother over the phone              Physical Exam:      Blood pressure 106/57, pulse 94, temperature 97.4  F (36.3  C), temperature source Axillary, resp. rate 18, height 1.829 m (6'), weight 78.9 kg (173 lb 15.1 oz), SpO2 (!) 87 %.  Vitals:    08/08/23 0900 08/09/23 0718 08/09/23 1540   Weight: 73.4 kg (161 lb 13.1 oz) 74.5 kg (164 lb 3.9 oz) 78.9 kg (173 lb 15.1 oz)     Vital Signs with Ranges  Temp:  [97.4  F (36.3  C)-98.4  F (36.9  C)] 97.4  F (36.3  C)  Pulse:  [94-98] 94  Resp:  [18] 18  BP: (102-118)/(57-72) 106/57  SpO2:  [87 %-98 %] 87 %  I/O's Last 24 hours  I/O last 3 completed shifts:  In: 120 [NG/GT:120]  Out: 1500 [Urine:1500]    Constitutional: conscious, alert, awake, mostly nonverbal but follows simple commands and interacts with yes/no, head nodding, thumbs up/down; resting comfortably in no apparent distress, cheerful   HEENT: Pupils equal and reactive to light and accomodation,   Oral cavity: moist oral mucosa ; poor oral hygiene   Cardiovascular: Normal s1 s2, regular rate and rhythm , no murmur   Lungs: B/l clear to auscultation, no wheezes or crepitations   Abdomen: Soft, nontender, nondistended.  feeding tube is in place.  No guarding, rigidity or rebound tenderness   LE : No edema   Musculoskeletal: has spastic hemiplegia on the right and is mostly nonverbal   Neuro:     Psychiatry: normal mood and affect                Medications:         acetylcysteine  2 mL Nebulization Q4H    azithromycin  250 mg Intravenous Q24H    Brivaracetam  100 mg Oral or Feeding Tube BID    carBAMazepine  100 mg Oral Daily    carBAMazepine  150 mg Oral or Feeding Tube TID    [Held by provider] hydrocortisone  7.5 mg  Oral Daily    [Held by provider] hydrocortisone  15 mg Oral Daily    lactated ringers  1,000 mL Intravenous Once    levothyroxine  150 mcg Oral Daily    metoclopramide  10 mg Oral 4x Daily AC & HS    pantoprazole  20 mg Per Feeding Tube QAM AC    piperacillin-tazobactam  3.375 g Intravenous Q6H    sodium bicarbonate  650 mg Oral BID    sodium chloride (PF)  3 mL Intracatheter Q8H    sodium chloride (PF)  3 mL Intracatheter Q8H     PRN Meds: acetaminophen, dextrose, glycopyrrolate, guaiFENesin, levalbuterol, melatonin, ondansetron **OR** ondansetron, sodium chloride (PF), sodium chloride (PF)         Data:      All new lab and imaging data was reviewed.   Recent Labs   Lab Test 08/10/23  0444 08/09/23  1138 08/08/23  0713 11/20/22  2053 07/05/22  0035 06/18/22  0327 06/16/22  2248 05/09/22  1122 12/27/21  1630   WBC 10.4 11.3* 7.8   < > 8.4   < >  --    < > 9.6   HGB 13.8 14.6 14.1   < > 12.2*   < >  --    < > 14.0   MCV 90 91 89   < > 89   < >  --    < > 93   * 149* 145*   < > 264   < >  --    < > 200   INR  --   --   --   --  1.15  --  1.22*  --  1.28*    < > = values in this interval not displayed.        Recent Labs   Lab Test 08/11/23  0344 08/10/23  2111 08/10/23  1758 08/10/23  0455 08/10/23  0444 08/09/23  0837 08/09/23  0657 08/08/23  2239 08/08/23  2224 08/08/23  0646   NA  --   --   --   --  140  --  138  --   --  140   POTASSIUM  --   --   --   --  4.5  --  4.3  --  4.6 3.3*   CHLORIDE  --   --   --   --  108*  --  106  --   --  95*   CO2  --   --   --   --  22 -- 23  --   --  32*   BUN  --   --   --   --  12.2  --  15.6  --   --  29.2*   CR  --   --   --   --  0.98  --  0.83  --   --  1.25*   ANIONGAP  --   --   --   --  10  --  9  --   --  13   STEVE  --   --   --   --  7.9*  --  8.1*  --   --  8.3*   * 162* 212*   < > 103*   < > 158*   < >  --  108*    < > = values in this interval not displayed.       Recent Labs   Lab Test 04/15/21  2250 02/19/21  1123 10/25/20  2100   TROPI <0.015  <0.015 0.047*

## 2023-08-11 NOTE — PROGRESS NOTES
Patient refused the neb treatments.  The patient is on RA SpO2 96.    Mariaa Garcia, RT  8/10/2023

## 2023-08-11 NOTE — PLAN OF CARE
Summary: acute renal failure, dehydration    DATE & TIME: 8/11/23 2238-3363  Cognitive Concerns/ Orientation : Alert, speech not clear.  BEHAVIOR & AGGRESSION TOOL COLOR: green  ABNL VS/O2: VSS on RA.Tachy and hypotensive at times.   MOBILITY: Ax2/total assist, T/R Q2Hr. Up to chair this afternoon.   PAIN MANAGMENT: No evidence of pain.   DIET: NPO except ice chips. Continue with oral cares.   BOWEL/BLADDER: incontinent of B&B. No BM this shift  ABNL LAB/BG: Phos 1.0 (Recheck scheduled for 2000), ,95,154.  DRAIN/DEVICES: 2 L. PIVs. External catheter when tolerated.   SKIN: redness blanchable on perineum/buttocks/sacrum mepilex applied, abrasions on L thigh. Scratches scattered.   TESTS/PROCEDURES: none  D/C DAY/GOALS/PLACE: Plan to switch to oral antibiotics on 8/12/2023 discharge home with mom.   OTHER IMPORTANT INFO: GJ-tube for TFs/meds running 60 mL/hr. Hold for 1 hour before/after Levothyroxine dose. G tube is patent for med administration.    MEDS: Resumed PTA hydrocortisone. Still refusing nebulizer's.

## 2023-08-11 NOTE — PLAN OF CARE
Summary: acute renal failure, dehydration    DATE & TIME: 8/10/23 NOC  Cognitive Concerns/ Orientation : Alert, speech not clear.  BEHAVIOR & AGGRESSION TOOL COLOR: green  ABNL VS/O2: Soft BP 92/51,  other VSS on RA.  MOBILITY: Ax2/total assist, T/R Q2Hr  PAIN MANAGMENT: Does not express pain.  DIET: NPO except ice chips.   BOWEL/BLADDER: incontinent of B&B. No BM this shift  ABNL LAB/BG: Phos 2.2 (2.5-4.5, replaced previous shift recheck 0600 on 08/11/23), ,255  DRAIN/DEVICES: 2 L. PIVs   SKIN: redness blanchable on perineum/buttocks/sacrum mepilex  applied, abrasions on L arm and thigh and R. Knee. Scratches scattered.   TESTS/PROCEDURES: none  D/C DAY/GOALS/PLACE: pending   OTHER IMPORTANT INFO: formula drip feeding running 60 mL/hr. Hold for 1 hour before/after Levothyroxine dose. G tube is patent for med administration.

## 2023-08-12 VITALS
SYSTOLIC BLOOD PRESSURE: 128 MMHG | TEMPERATURE: 97.4 F | WEIGHT: 172.84 LBS | BODY MASS INDEX: 23.41 KG/M2 | DIASTOLIC BLOOD PRESSURE: 69 MMHG | HEIGHT: 72 IN | HEART RATE: 66 BPM | OXYGEN SATURATION: 95 % | RESPIRATION RATE: 20 BRPM

## 2023-08-12 LAB
GLUCOSE BLDC GLUCOMTR-MCNC: 116 MG/DL (ref 70–99)
GLUCOSE BLDC GLUCOMTR-MCNC: 153 MG/DL (ref 70–99)

## 2023-08-12 PROCEDURE — 99239 HOSP IP/OBS DSCHRG MGMT >30: CPT | Performed by: HOSPITALIST

## 2023-08-12 PROCEDURE — 250N000009 HC RX 250: Performed by: INTERNAL MEDICINE

## 2023-08-12 PROCEDURE — 250N000009 HC RX 250: Performed by: NURSE PRACTITIONER

## 2023-08-12 PROCEDURE — 999N000157 HC STATISTIC RCP TIME EA 10 MIN

## 2023-08-12 PROCEDURE — 250N000011 HC RX IP 250 OP 636: Mod: JZ | Performed by: NURSE PRACTITIONER

## 2023-08-12 PROCEDURE — 250N000013 HC RX MED GY IP 250 OP 250 PS 637: Performed by: INTERNAL MEDICINE

## 2023-08-12 PROCEDURE — 258N000003 HC RX IP 258 OP 636: Performed by: NURSE PRACTITIONER

## 2023-08-12 RX ORDER — AMOXICILLIN AND CLAVULANATE POTASSIUM 400; 57 MG/5ML; MG/5ML
875 POWDER, FOR SUSPENSION ORAL 2 TIMES DAILY
Qty: 65.64 ML | Refills: 0 | Status: SHIPPED | OUTPATIENT
Start: 2023-08-12 | End: 2023-08-15

## 2023-08-12 RX ADMIN — Medication 20 MG: at 06:31

## 2023-08-12 RX ADMIN — SODIUM BICARBONATE 650 MG TABLET 650 MG: at 08:01

## 2023-08-12 RX ADMIN — CARBAMAZEPINE 150 MG: 100 SUSPENSION ORAL at 00:28

## 2023-08-12 RX ADMIN — LEVOTHYROXINE SODIUM 150 MCG: 75 TABLET ORAL at 08:00

## 2023-08-12 RX ADMIN — METOCLOPRAMIDE HYDROCHLORIDE 10 MG: 5 SOLUTION ORAL at 11:34

## 2023-08-12 RX ADMIN — CARBAMAZEPINE 150 MG: 100 SUSPENSION ORAL at 05:06

## 2023-08-12 RX ADMIN — HYDROCORTISONE 15 MG: 10 TABLET ORAL at 08:00

## 2023-08-12 RX ADMIN — METOCLOPRAMIDE HYDROCHLORIDE 10 MG: 5 SOLUTION ORAL at 08:01

## 2023-08-12 RX ADMIN — BRIVARACETAM 100 MG: 10 SOLUTION ORAL at 09:13

## 2023-08-12 RX ADMIN — LEVALBUTEROL HYDROCHLORIDE 1.25 MG: 1.25 SOLUTION RESPIRATORY (INHALATION) at 09:06

## 2023-08-12 RX ADMIN — AZITHROMYCIN MONOHYDRATE 250 MG: 500 INJECTION, POWDER, LYOPHILIZED, FOR SOLUTION INTRAVENOUS at 10:14

## 2023-08-12 RX ADMIN — PIPERACILLIN AND TAZOBACTAM 3.38 G: 3; .375 INJECTION, POWDER, FOR SOLUTION INTRAVENOUS at 05:06

## 2023-08-12 RX ADMIN — CARBAMAZEPINE 150 MG: 100 SUSPENSION ORAL at 11:45

## 2023-08-12 RX ADMIN — PIPERACILLIN AND TAZOBACTAM 3.38 G: 3; .375 INJECTION, POWDER, FOR SOLUTION INTRAVENOUS at 11:34

## 2023-08-12 ASSESSMENT — ACTIVITIES OF DAILY LIVING (ADL)
ADLS_ACUITY_SCORE: 79
ADLS_ACUITY_SCORE: 77
ADLS_ACUITY_SCORE: 77
ADLS_ACUITY_SCORE: 79
ADLS_ACUITY_SCORE: 77

## 2023-08-12 NOTE — PROGRESS NOTES
Patient stable for discharge. AVS and discharge meds sent with patient. Patients home medications from pharmacy also sent home with patient. IV out. Sent home with six day supply with tube feed. EMS provided transportation via stretcher. Belongings sent with patient.

## 2023-08-12 NOTE — PROGRESS NOTES
Summary: Acute renal failure, dehydration    DATE & TIME: 8/12/2023 [id number]    Cognitive Concerns/ Orientation: Alert, speech unclear.   BEHAVIOR & AGGRESSION TOOL COLOR: Green  ABNL VS/O2: VSS on RA. Bradycardic when sleeping.   MOBILITY: Ax2/total assist, T/R Q2Hr.   PAIN MANAGMENT: No evidence of pain.   DIET: NPO except ice chips. Continue with oral cares.   BOWEL/BLADDER: Incontinent of B&B.   ABNL LAB/BG: Unable to collect potassium and phosphorus. The latest level was normal.   DRAIN/DEVICES: L PIV-SL. Taken out before discharge.   SKIN: redness blanchable on perineum/buttocks/sacrum mepilex applied, abrasions on L thigh. Scratches scattered.   TESTS/PROCEDURES: none this shift   D/C DAY/GOALS/PLACE: Switched to oral antibiotics on 8/12/2023 and discharged home at 1430 via transportation service.   OTHER IMPORTANT INFO: GJ-tube for TFs/meds running 60 mL/hr. Hold for 1 hour before/after Levothyroxine dose. G tube is patent for med administration.   MEDS: Resumed PTA hydrocortisone. Still refusing nebulizer's. Was sent home with six bottles of Jevity and tubing supplies.

## 2023-08-12 NOTE — DISCHARGE INSTRUCTIONS
Please note that discharge information on Tube feeding regimen has been sent to the following per your request    [employer] [facility]  Phone#[phone]  Fax# [phone] (sent as an urgent request 8/12/23)    Please check with Keyon's PCP on Monday 8/14 to Ensure that they are in communication for Keyon's ongoing order confirmation for tube feeds and supplies.    Please call [employer] and ask to speak with a Supervisor on Monday 8/14 regarding the status of Keyon's Tube Feeds and supplies.  They should be able to communicate with Keyon's Primary care MD to get what is needed.  They are aware that there has been limited supply sent home with Keyon for his recent discharge from [facility].    If you would like to inquire with other suppliers regarding availability of tube feeds and supplies please check with the following:    [facility] [phone]  [facility] [phone]  [facility] [phone]

## 2023-08-12 NOTE — PLAN OF CARE
Goal Outcome Evaluation:             Summary: Acute renal failure, dehydration    DATE & TIME: 8/11/23-8/12/23 [id number]    Cognitive Concerns/ Orientation: Alert, speech not clear.  BEHAVIOR & AGGRESSION TOOL COLOR: Green  ABNL VS/O2: VSS on RA.  MOBILITY: Ax2/total assist, T/R Q2Hr.   PAIN MANAGMENT: No evidence of pain.   DIET: NPO except ice chips. Continue with oral cares.   BOWEL/BLADDER: Incontinent of B&B.   ABNL LAB/BG: Phos 2.5, K 4,   DRAIN/DEVICES: L PIV-SL   SKIN: redness blanchable on perineum/buttocks/sacrum mepilex applied, abrasions on L thigh. Scratches scattered.   TESTS/PROCEDURES: none this shift   D/C DAY/GOALS/PLACE: Plan to switch to oral antibiotics on 8/12/2023 discharge home with mom.   OTHER IMPORTANT INFO: GJ-tube for TFs/meds running 60 mL/hr. Hold for 1 hour before/after Levothyroxine dose. G tube is patent for med administration.     MEDS: Resumed PTA hydrocortisone. Still refusing nebulizer's.

## 2023-08-12 NOTE — DISCHARGE SUMMARY
[facility]  Discharge Summary        Keyon Farias MRN# [id number]   YOB: 1960 Age: 63 year old     Date of Admission:  8/7/2023  Date of Discharge:  8/12/2023  Admitting Physician:  Sun Keita DO  Discharge Physician: Helio Galan MD  Discharging Service: Hospitalist     Primary Provider: Elsa Queen  Primary Care Physician Phone Number: [phone]         Discharge Diagnoses/Problem Oriented Hospital Course (Providers):    Keyon Fraias was admitted on 8/7/2023 by Sun Keita DO and I would refer you to their history and physical.  The following problems were addressed during his hospitalization:    Keyon Farias is a 62 year old male with PMHx of TBI with aphasia, R sided spastic hemiplegia, chronic hemiplegia and chronic dysphagia with GJ-tube for TFs/meds, seizure disorder ,  panhypopituitarism and hx of prior hospitalizations for recurrent pneumonia who was admitted on 8/7/2023 for evaluation of fever and increased daytime somnolence as well as acute kidney injury and hypokalemia.       Fever of unclear etiology  likely aspiration pneumonia/pneumonitis  Likely sepsis sec to above  Generalized weakness  * Has hx of several prior hospitalizations for aspiration pneumonitis. Last hospital stay was in 11/0223.   * Reportedly febrile at home with TMax 100.5, with increased daytime somnolence.   * In ED, was afebrile, mildly tachycardic (HR 110s) but BPs stable. Initially required O2 but was quickly weaned off and O2 sats stable on RA since. WBC 11.9, procal 0.1 (low risk), lactate nl. COVID, RSV and influenza swabs were neg. CXR with bibasilar subsegmental atelectasis but no focal infiltrate. UA bland and not suggestive of UTI. Empiric abx held on admission. Placed on IVFs  -- initially remained afebrile off abx, WBC quickly normalized     - was evaluated by RRT on 8/9 for increased work of breathing, tachycardia and fever of 102.3, started on  empiric Zosyn/azithromycin for likely aspiration pneumonia; also started on Mucomyst nebs QID to help mobilize secretions  - CXR 8/9 noted linear and patchy opacities in the lung bases, right greater than left, could represent pneumonia and/or atelectasis. No pleural effusion or pneumothorax.   - Respiratory status currently stable on room air   - patient has been refusing nebs treatments; respiratory status stable on room air  - was febrile to 102.3 on 8/9/23 ; has been afebrile since 8/10; was started on empiric Zosyn and azithromycin-- switched to Augmentin via feeding tube to complete 7 to 10 days antibiotic course  - blood cultures from 8/7 and 8/9 with no growth so far     Acute kidney injury: Improved  Dehydration  Hypokalemia: Improved  Hypophosphatemia  * Labs on admission notable for K 3.0, Cr 1.65 (baseline 0.8-1.0). IVFs with KCl started on admisson  - renal function improved with creatinine 1.65---> 0.98  - got IVF on 8/10 for hypotension; IVF d/kevin 8/11   - phosphorus 1.0---> 2.5   - electrolytes supplemented per protocol     Spastic hemiplegia  Traumatic brain injury  Nonverbal  Chronic right-sided paresis  Chronic dysphagia, s/p GJ tube  Clogged J tube  * Bed-bound and essentially nonverbal at baseline. Takes meds via G-tube. 5 cans Jevity/d via Gtube.  * Chronic and stable on home meds, including Tegretol and Brivaracetam  * Nutritionist consulted for TFs this stay.   - J tube clogged and exchanged by IR on 8/10/23 and tube feed resumed and tolerating well     Panhypopituitarism  Hypothyroidism  * Chronic and stable on home meds, including hydrocortisone and Synthroid   - was given stress dose steroids with IV hydrocortisone 50 mg Q8 hours X 3 doses  - BP stable; resumed PTA PO hydrocortisone (8/11/23)     GERD  * Chronic and stable on PPI     Code Status: Full Code     Diet:   NPO for Medical/Clinical Reasons Except for: Ice Chips  Diet  Adult Formula Drip Feeding: Continuous Jevity 1.5;  Jejunostomy; Goal Rate: 60; mL/hr; increase rate to 60 mL/hr now. hold for 1 hour before/after levothyroxine dose      - his mother recently switched the home care agency and reports having some difficulty getting the tube feeding supplies;  consulted for disposition     Clinically Significant Risk Factors                                             Brief Hospital Stay Summary Sent Home With Patient in AVS:        Reason for your hospital stay      Evaluation for possible infection in light of your fever. No findings of   infection were identified this stay and you had no fevers while here.   Additionally, your labs suggested some degree of dehydration, for which   you were treated with IV fluids and given some extra potassium.                Pending Results:        Unresulted Labs Ordered in the Past 30 Days of this Admission       Date and Time Order Name Status Description    8/9/2023 11:05 AM Blood Culture Hand, Right Preliminary     8/9/2023 11:05 AM Blood Culture Arm, Right Preliminary     8/7/2023  8:03 PM Blood Culture Peripheral Blood Preliminary     8/7/2023  8:03 PM Blood Culture Peripheral Blood Preliminary               Discharge Instructions and Follow-Up:      Follow-up Appointments     Follow-up and recommended labs and tests       Follow up with your PCP as scheduled or as needed.                Discharge Disposition:      Discharged to home        Discharge Medications:        Current Discharge Medication List        START taking these medications    Details   amoxicillin-clavulanate (AUGMENTIN) 400-57 MG/5ML suspension 10.94 mLs (875 mg) by Per Feeding Tube route 2 times daily for 3 days  Qty: 65.64 mL, Refills: 0    Associated Diagnoses: Aspiration pneumonia, unspecified aspiration pneumonia type, unspecified laterality, unspecified part of lung (H)           CONTINUE these medications which have NOT CHANGED    Details   acetaminophen (TYLENOL) 650 MG CR tablet Take 650 mg by mouth  every 8 hours as needed for mild pain or fever      albuterol (PROVENTIL) (5 MG/ML) 0.5% neb solution Take 0.5 mLs (2.5 mg) by nebulization every 6 hours as needed for shortness of breath, wheezing or cough 0700 1100 1500 1900 with mucomyst  Qty: 240 mL, Refills: 0    Associated Diagnoses: Aspiration pneumonitis (H)      azelastine (OPTIVAR) 0.05 % ophthalmic solution INSTILL 1 DROP IN AFFECTED EYE(S) TWICE DAILY FOR 10 DAYS  Qty: 6 mL, Refills: 3    Associated Diagnoses: Pink eye disease of both eyes      bacitracin 500 UNIT/GM external ointment Apply topically daily as needed for wound care To PEG site.  Qty: 30 g, Refills: 3    Associated Diagnoses: G tube feedings (H)      Brivaracetam (BRIVIACT) 10 MG/ML solution 100 mg by Oral or Feeding Tube route 2 times daily 0900, 2100      !! carBAMazepine (TEGRETOL) 100 MG/5ML suspension Take 100 mg by mouth daily Take at 1800      !! carBAMazepine (TEGRETOL) 100 MG/5ML suspension 150 mg by Oral or Feeding Tube route 3 times daily At 06:00, 12:00, and 24:00 for seizures      COMPOUNDED NON-CONTROLLED SUBSTANCE (CMPD RX) - PHARMACY TO MIX COMPOUNDED MEDICATION Scopolamine 0.4mg capsules - take 1 capsule by feeding tube three times daily as needed  Qty: 90 capsule, Refills: 1    Associated Diagnoses: Excessive oral secretions      glycopyrrolate (ROBINUL) 1 MG tablet Take 1 tablet (1 mg) by mouth 2 times daily as needed (secretions)  Qty: 180 tablet, Refills: 1    Associated Diagnoses: Excessive oral secretions      guaiFENesin (MUCINEX) 600 MG 12 hr tablet Take 1 tablet (600 mg) by mouth 2 times daily as needed for congestion  Qty: 60 tablet, Refills: 0    Associated Diagnoses: Aspiration pneumonitis (H)      hydrocortisone (CORTAID) 1 % external cream Apply topically 2 times daily as needed Apply to reddened memo areas as needed      hydrocortisone (CORTEF) 5 MG tablet Take 15 mg (3 tablets) in the morning and 5 mg (1 tablet)  at 2:00 PM. During illness patient takes  "more as a stress dose. Please increase the dose as directed.  Qty: 400 tablet, Refills: 3    Associated Diagnoses: Secondary adrenal insufficiency (H)      insulin syringe-needle U-100 (29G X 1/2\" 1 ML) 29G X 1/2\" 1 ML miscellaneous Syringe needle use as needed  Qty: 200 each, Refills: 11    Associated Diagnoses: Panhypopituitarism (H)      levothyroxine (SYNTHROID/LEVOTHROID) 150 MCG tablet TAKE 1 TABLET(150 MCG) BY MOUTH DAILY  Qty: 90 tablet, Refills: 3    Associated Diagnoses: Secondary hypothyroidism      metoclopramide (REGLAN) 10 MG/10ML SOLN solution Take 10 mLs (10 mg) by mouth 4 times daily (before meals and nightly) 0800, 1200, 1600, 2000 Disconnects bag before administration, then waits 45 mins before reconnecting after giving the medication  Qty: 2365 mL, Refills: 5    Associated Diagnoses: Aspiration pneumonitis (H)      multivitamin, therapeutic (THERA-VIT) TABS tablet Take 1 tablet by mouth daily      mupirocin (BACTROBAN) 2 % external ointment APPLY TOPICALLY TO THE AFFECTED AREA TWICE DAILY AS NEEDED  Qty: 30 g, Refills: 1    Associated Diagnoses: Panhypopituitarism (H); Hypogonadism male      neomycin-polymixin-dexamethasone (MAXITROL) 0.1 % ophthalmic suspension Use in both eyes every 4 hours for 7 days  Qty: 5 mL, Refills: 1    Comments: 1-2 drops in both eyes every 4-6 hours daily for 5-7 days.  Associated Diagnoses: Pink eye disease of both eyes      pantoprazole (PROTONIX) 2 mg/mL SUSP suspension TAKE 20 ML PER FEEDING TUBE DAILY  Qty: 600 mL, Refills: 3    Associated Diagnoses: Gastroesophageal reflux disease      sodium bicarbonate 650 MG tablet TAKE 1 TABLET(650 MG) BY MOUTH TWICE DAILY  Qty: 180 tablet, Refills: 3    Associated Diagnoses: Encephalopathy      testosterone cypionate (DEPOTESTOSTERONE) 200 MG/ML injection Inject 0.25 mLs (50 mg) into the muscle once a week  Qty: 4 mL, Refills: 3    Associated Diagnoses: Panhypopituitarism (H); Hypogonadism male      vitamin C (ASCORBIC ACID) " 1000 MG TABS 1,000 mg by Oral or Feeding Tube route daily       vitamin D3 (CHOLECALCIFEROL) 2000 units (50 mcg) tablet Take 2,000 Units by mouth daily Crush and feed via j-tube @@ 0900       !! - Potential duplicate medications found. Please discuss with provider.            Allergies:         Allergies   Allergen Reactions    Valproic Acid Other (See Comments)     Toxicity w/ bone marrow suspension, elevated ammonia levels     Dilantin [Phenytoin Sodium] Other (See Comments)     Severe Trembling    Scopolamine Hives     Hives with the patch - oral no problem           Consultations This Hospital Stay:      Consultation during this admission received from nutrition and interventional radiology        Condition and Physical on Discharge:        Discharge condition: Stable   Vitals: Blood pressure 121/81, pulse 69, temperature 98.3  F (36.8  C), temperature source Axillary, resp. rate 18, height 1.829 m (6'), weight 78.4 kg (172 lb 13.5 oz), SpO2 100 %.     Constitutional: conscious, alert, awake, mostly nonverbal but follows simple commands and interacts with yes/no, head nodding, thumbs up/down; resting comfortably in no apparent distress, cheerful   HEENT: Pupils equal and reactive to light and accomodation,   Oral cavity: moist oral mucosa ; poor oral hygiene   Cardiovascular: Normal s1 s2, regular rate and rhythm , no murmur   Lungs: B/l clear to auscultation, no wheezes or crepitations   Abdomen: Soft, nontender, nondistended.  feeding tube is in place.  No guarding, rigidity or rebound tenderness   LE : No edema   Musculoskeletal: has spastic hemiplegia on the right and is mostly nonverbal   Neuro:     Psychiatry: normal mood and affect             Discharge Time:      Greater than 30 minutes.        Image Results From This Hospital Stay (For Non-EPIC Providers):        Results for orders placed or performed during the hospital encounter of 08/07/23   Chest XR,  PA & LAT    Narrative    EXAM: XR CHEST 2  VIEWS  LOCATION: [facility]  DATE: 8/7/2023    INDICATION: Hypoxia and sepsis.  COMPARISON: 04/06/2023      Impression    IMPRESSION: Heart size and vascularity are normal. Chronically elevated right hemidiaphragm unchanged. Shallow inspiration accentuates bibasilar subsegmental atelectasis. No focal consolidation, pneumothorax nor pleural effusion.   XR Chest Port 1 View    Narrative    XR CHEST PORT 1 VIEW 8/9/2023 9:45 AM    HISTORY: fever, please assess for infiltrates    COMPARISON: 8/7/2023      Impression    IMPRESSION: Linear and patchy opacities in the lung bases, right  greater than left, could represent pneumonia and/or atelectasis. No  pleural effusion or pneumothorax. Normal heart size.    JULIÁN MURO MD         SYSTEM ID:  [id number]   IR Gastro Jejunostomy Tube Change    Narrative    INTERVENTIONAL RADIOLOGY PERCUTANEOUS GASTROJEJUNOSTOMY EXCHANGE  August 10, 2023 at 1229 hours    INTERVENTIONAL RADIOLOGIST: Megan Stout DO    INDICATION: Clogged J portion of the GJ tube..    CONTRAST: 25 mL Omnipaque intra enteric.  ANTIBIOTICS: None.  ADDITIONAL MEDICATIONS: None.    FLUOROSCOPIC TIME: 0.8 minutes.  RADIATION DOSE: Air Kerma: 5.72 mGy.    COMPLICATIONS: No immediate complications.    UNIVERSAL PROTOCOL: The operative site was marked and any prior  imaging was reviewed. Required items including blood products,  implants, devices and special equipment was made available. Patient  identity was confirmed either verbally, with demographic information,  hospital assigned identification or other identification markers. A  timeout was performed immediately prior to the procedure.    STERILE BARRIER TECHNIQUE: Maximum sterile barrier technique was used.  Cutaneous antisepsis was performed at the operative site with  application of 2% chlorhexidine and large sterile drape. Prior to the  procedure, the  and assistant performed hand hygiene and wore  hat, mask, sterile  gown, and sterile gloves during the entire  procedure.    PROCEDURE/TECHNIQUE: The indwelling 18 Ghanaian gastrojejunostomy tube's  gastric and jejunal ports were injected and images obtained. The tube  was then exchanged over a wire for a new 18 Ghanaian, 45cm length  gastrojejunostomy tube which was positioned with distal tip in the  proximal jejunum. The retention balloon was inflated.  A post  placement injection of both the gastric and jejunal ports was  performed.    FINDINGS: The initial injection shows the gastric lumen is patent. The  jejunal lumen is occluded After exchange, the new gastrojejunostomy is  in appropriate position with the gastric port within the stomach and  distal jejunal port near jejunal origin.      Impression    IMPRESSION: Gastrojejunostomy tube change as discussed above.    CPT codes for physician reference only:  62174    REMBERTO GROSS DO         SYSTEM ID:  [id number]     *Note: Due to a large number of results and/or encounters for the requested time period, some results have not been displayed. A complete set of results can be found in Results Review.           Most Recent Lab Results In EPIC (For Non-EPIC Providers):    Most Recent 3 CBC's:  Recent Labs   Lab Test 08/10/23  0444 08/09/23  1138 08/08/23  0713   WBC 10.4 11.3* 7.8   HGB 13.8 14.6 14.1   MCV 90 91 89   * 149* 145*      Most Recent 3 BMP's:  Recent Labs   Lab Test 08/12/23  0756 08/12/23  0203 08/11/23  2203 08/11/23  0841 08/11/23  0820 08/10/23  0455 08/10/23  0444 08/09/23  0837 08/09/23  0657 08/08/23  2224 08/08/23  0646   NA  --   --   --   --   --   --  140  --  138  --  140   POTASSIUM  --   --   --   --  4.0  --  4.5  --  4.3   < > 3.3*   CHLORIDE  --   --   --   --   --   --  108*  --  106  --  95*   CO2  --   --   --   --   --   --  22  --  23  --  32*   BUN  --   --   --   --   --   --  12.2  --  15.6  --  29.2*   CR  --   --   --   --   --   --  0.98  --  0.83  --  1.25*   ANIONGAP  --   --   --    --   --   --  10  --  9  --  13   STEVE  --   --   --   --   --   --  7.9*  --  8.1*  --  8.3*   * 153* 145*   < >  --    < > 103*   < > 158*   < > 108*    < > = values in this interval not displayed.     Most Recent 3 Troponin's:  Recent Labs   Lab Test 04/15/21  2250 02/19/21  1123 10/25/20  2100   TROPI <0.015 <0.015 0.047*     Most Recent 3 INR's:  Recent Labs   Lab Test 07/05/22  0035 06/16/22  2248 12/27/21  1630   INR 1.15 1.22* 1.28*     Most Recent 2 LFT's:  Recent Labs   Lab Test 08/07/23  2024 01/15/23  1857   AST 26 26   ALT 20 27   ALKPHOS 104 93   BILITOTAL 0.3 0.8     Most Recent Cholesterol Panel:  Recent Labs   Lab Test 11/29/16  0430   TRIG 100     Most Recent 6 Bacteria Isolates From Any Culture (See EPIC Reports for Culture Details):  Recent Labs   Lab Test 05/23/21  0316 05/23/21  0250 04/15/21  2250 02/22/21  1622 02/22/21  1439 02/22/21  1413   CULT No growth No growth No growth No growth No growth No growth     Most Recent TSH, T4 and HgbA1c:   Recent Labs   Lab Test 05/10/23  1554 11/20/22  2053   TSH <0.01*  --    T4 1.49  --    A1C  --  5.6

## 2023-08-12 NOTE — PROGRESS NOTES
Patient refused to take the scheduled nebs. The patient is on RA and SPO2 mid 90s.  RT will follow.  Mariaa Garcia, RT  8/11/2023

## 2023-08-12 NOTE — CONSULTS
Care Management Discharge Note    Discharge Date: 08/12/2023       Discharge Disposition: Home with prior to admission assistance from Mother and PCA and Tube feeds (per Mother working with [facility] and PCP)    Discharge Services:  [facility] between 14:21-15:26 faxed PCS copy on chart     Discharge DME: None    Discharge Transportation: health plan transportation    Private pay costs discussed: Not applicable    Does the patient's insurance plan have a 3 day qualifying hospital stay waiver?  No    PAS Confirmation Code:  n/a  Patient/family educated on Medicare website which has current facility and service quality ratings:  no    Education Provided on the Discharge Plan:  yes  Persons Notified of Discharge Plans: patient's Mother, Bedside RN  Patient/Family in Agreement with the Plan: yes    Handoff Referral Completed: Yes to [facility] and PCP    Additional Information:  Writer called patient's Mother Savannah regarding discharge planning.  Savannah is aware that the patient is medically stable for discharge to home with prior to admission assistance.  Savannah informed this writer that patient tube feed supplier recently changed to [facility] and she has been working with [facility] and the patients PCP to get the needed Tube feedings and supplies.  Writer advised Savannah to call both PCP and [facility] on Monday 8/14 to get an update on the status of supplies also writer will fax the orders to the following:  [facility] Fax#[phone] hand off to PCP and writer included additional infusion companies for Savannah to inquire with if [facility] is not able to respond in an appropriate amount of time.  We are sending some tube feed home with the patient, however Savannah will need to coordinate with PCP and [facility] for delivery next week.  Writer called [employer] and ride is scheduled between 14:21-15:26 Savannah is aware.    Writer updated Bedside, Charge  and LYRIC on the above.  No further needs identified.    Ingris Scott RN, BSN, ACM   Care Transitions Specialist  [facility]  Care Transitions Specialist  [id number] [id number]  [email or web address]  Office: [phone] Fax: [phone]  [facility]

## 2023-08-13 LAB
BACTERIA BLD CULT: NO GROWTH
BACTERIA BLD CULT: NO GROWTH

## 2023-08-14 LAB
BACTERIA BLD CULT: NO GROWTH
BACTERIA BLD CULT: NO GROWTH

## 2023-09-02 DIAGNOSIS — E27.49 SECONDARY ADRENAL INSUFFICIENCY (H): ICD-10-CM

## 2023-09-02 NOTE — PROGRESS NOTES
[facility]  Patient is currently open to home care services with [facility]. The patient is currently receiving RN services.  [facility]  and team have been notified of patient admission.  [facility] liaison will continue to follow patient during stay.        Attending Attestation (For Attendings USE Only)...

## 2023-09-05 DIAGNOSIS — K21.9 GASTROESOPHAGEAL REFLUX DISEASE: ICD-10-CM

## 2023-09-05 RX ORDER — HYDROCORTISONE 5 MG/1
TABLET ORAL
Qty: 400 TABLET | Refills: 3 | Status: SHIPPED | OUTPATIENT
Start: 2023-09-05 | End: 2024-08-05

## 2023-09-05 NOTE — TELEPHONE ENCOUNTER
hydrocortisone (CORTEF) 5 MG tablet      Last Written Prescription Date:  8/15/22  Last Fill Quantity: 400,   # refills: 3  Last Office Visit : 2/8/23  Future Office visit:  12/13/23    Routing refill request to provider for review/approval because:  Patient reports PM dose is 7.5mg  Clinic note 2/8/23 states same except with instructions about sick days-please clarify  Then after he improves from illness; dose needs to go back to his usual dose of 15 mg in the morning and 5 mg in the afternoon.     
Acute renal failure    Anemia    Bipolar 1 disorder    Bipolar affective disorder    BPH (benign prostatic hyperplasia)    Cardiac arrhythmia    Cholecystitis    CKD (chronic kidney disease)    CKD (chronic kidney disease)    COPD (chronic obstructive pulmonary disease)    COPD (chronic obstructive pulmonary disease)    Coronary artery disease    Coronary atherosclerosis    CVA (cerebral vascular accident)    Diabetes mellitus    Dizziness    DM (diabetes mellitus)    Dyslipidemia    Fainting    HLD (hyperlipidemia)    HTN (hypertension)    Hydronephrosis    Hypertension    Left heart failure    Lumbar radiculopathy    Pancreatitis    Reflux esophagitis
[employer]

## 2023-09-07 ENCOUNTER — MEDICAL CORRESPONDENCE (OUTPATIENT)
Dept: HEALTH INFORMATION MANAGEMENT | Facility: CLINIC | Age: 63
End: 2023-09-07

## 2023-09-09 ENCOUNTER — HOSPITAL ENCOUNTER (INPATIENT)
Facility: CLINIC | Age: 63
LOS: 4 days | Discharge: HOME | DRG: 871 | End: 2023-09-13
Attending: EMERGENCY MEDICINE | Admitting: HOSPITALIST
Payer: MEDICARE

## 2023-09-09 ENCOUNTER — APPOINTMENT (OUTPATIENT)
Dept: CT IMAGING | Facility: CLINIC | Age: 63
DRG: 871 | End: 2023-09-09
Attending: EMERGENCY MEDICINE
Payer: MEDICARE

## 2023-09-09 ENCOUNTER — APPOINTMENT (OUTPATIENT)
Dept: GENERAL RADIOLOGY | Facility: CLINIC | Age: 63
DRG: 871 | End: 2023-09-09
Attending: EMERGENCY MEDICINE
Payer: MEDICARE

## 2023-09-09 DIAGNOSIS — J69.0 ASPIRATION PNEUMONITIS (H): Primary | ICD-10-CM

## 2023-09-09 DIAGNOSIS — J96.01 ACUTE RESPIRATORY FAILURE WITH HYPOXIA (H): ICD-10-CM

## 2023-09-09 DIAGNOSIS — A41.9 SEPSIS, DUE TO UNSPECIFIED ORGANISM, UNSPECIFIED WHETHER ACUTE ORGAN DYSFUNCTION PRESENT (H): ICD-10-CM

## 2023-09-09 PROBLEM — Z99.3 WHEELCHAIR DEPENDENCE: Status: ACTIVE | Noted: 2023-09-09
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
## 2023-09-09 PROBLEM — Z78.9 FULL CODE STATUS: Status: ACTIVE | Noted: 2017-05-11

## 2023-09-09 PROBLEM — G81.01: Status: ACTIVE | Noted: 2023-09-09

## 2023-09-09 PROBLEM — G82.20 PARAPLEGIA (H): Status: ACTIVE | Noted: 2023-09-09

## 2023-09-09 LAB
ALBUMIN SERPL BCG-MCNC: 4.1 G/DL (ref 3.5–5.2)
ALBUMIN UR-MCNC: 50 MG/DL
ALP SERPL-CCNC: 112 U/L (ref 40–129)
ALT SERPL W P-5'-P-CCNC: 20 U/L (ref 0–70)
ANION GAP SERPL CALCULATED.3IONS-SCNC: 13 MMOL/L (ref 7–15)
APPEARANCE UR: CLEAR
AST SERPL W P-5'-P-CCNC: 26 U/L (ref 0–45)
ATRIAL RATE - MUSE: 110 BPM
BASOPHILS # BLD AUTO: 0.1 10E3/UL (ref 0–0.2)
BASOPHILS NFR BLD AUTO: 1 %
BILIRUB SERPL-MCNC: 0.6 MG/DL
BILIRUB UR QL STRIP: NEGATIVE
BUN SERPL-MCNC: 31.3 MG/DL (ref 8–23)
CALCIUM SERPL-MCNC: 9.1 MG/DL (ref 8.8–10.2)
CHLORIDE SERPL-SCNC: 88 MMOL/L (ref 98–107)
COLOR UR AUTO: YELLOW
CREAT SERPL-MCNC: 1.49 MG/DL (ref 0.67–1.17)
CRP SERPL-MCNC: 91.04 MG/L
DEPRECATED HCO3 PLAS-SCNC: 37 MMOL/L (ref 22–29)
DIASTOLIC BLOOD PRESSURE - MUSE: NORMAL MMHG
EGFRCR SERPLBLD CKD-EPI 2021: 53 ML/MIN/1.73M2
EOSINOPHIL # BLD AUTO: 0.5 10E3/UL (ref 0–0.7)
EOSINOPHIL NFR BLD AUTO: 4 %
ERYTHROCYTE [DISTWIDTH] IN BLOOD BY AUTOMATED COUNT: 14.3 % (ref 10–15)
FLUAV RNA SPEC QL NAA+PROBE: NEGATIVE
FLUBV RNA RESP QL NAA+PROBE: NEGATIVE
GLUCOSE BLDC GLUCOMTR-MCNC: 143 MG/DL (ref 70–99)
GLUCOSE SERPL-MCNC: 106 MG/DL (ref 70–99)
GLUCOSE UR STRIP-MCNC: NEGATIVE MG/DL
HCO3 BLDV-SCNC: 40 MMOL/L (ref 21–28)
HCO3 BLDV-SCNC: 41 MMOL/L (ref 21–28)
HCT VFR BLD AUTO: 50 % (ref 40–53)
HGB BLD-MCNC: 16.2 G/DL (ref 13.3–17.7)
HGB UR QL STRIP: NEGATIVE
HYALINE CASTS: 7 /LPF
IMM GRANULOCYTES # BLD: 0.1 10E3/UL
IMM GRANULOCYTES NFR BLD: 1 %
INTERPRETATION ECG - MUSE: NORMAL
KETONES UR STRIP-MCNC: NEGATIVE MG/DL
LACTATE BLD-SCNC: 2.4 MMOL/L
LACTATE BLD-SCNC: 2.5 MMOL/L
LACTATE SERPL-SCNC: 2 MMOL/L (ref 0.7–2)
LEUKOCYTE ESTERASE UR QL STRIP: NEGATIVE
LYMPHOCYTES # BLD AUTO: 3.1 10E3/UL (ref 0.8–5.3)
LYMPHOCYTES NFR BLD AUTO: 21 %
MAGNESIUM SERPL-MCNC: 2.8 MG/DL (ref 1.7–2.3)
MCH RBC QN AUTO: 29 PG (ref 26.5–33)
MCHC RBC AUTO-ENTMCNC: 32.4 G/DL (ref 31.5–36.5)
MCV RBC AUTO: 90 FL (ref 78–100)
MONOCYTES # BLD AUTO: 1.3 10E3/UL (ref 0–1.3)
MONOCYTES NFR BLD AUTO: 9 %
MUCOUS THREADS #/AREA URNS LPF: PRESENT /LPF
NEUTROPHILS # BLD AUTO: 9.7 10E3/UL (ref 1.6–8.3)
NEUTROPHILS NFR BLD AUTO: 64 %
NITRATE UR QL: NEGATIVE
NRBC # BLD AUTO: 0 10E3/UL
NRBC BLD AUTO-RTO: 0 /100
NT-PROBNP SERPL-MCNC: 148 PG/ML (ref 0–900)
P AXIS - MUSE: 62 DEGREES
PCO2 BLDV: 55 MM HG (ref 40–50)
PCO2 BLDV: 66 MM HG (ref 40–50)
PH BLDV: 7.38 [PH] (ref 7.32–7.43)
PH BLDV: 7.47 [PH] (ref 7.32–7.43)
PH UR STRIP: 8 [PH] (ref 5–7)
PLATELET # BLD AUTO: 173 10E3/UL (ref 150–450)
PO2 BLDV: 24 MM HG (ref 25–47)
PO2 BLDV: 40 MM HG (ref 25–47)
POTASSIUM SERPL-SCNC: 3.2 MMOL/L (ref 3.4–5.3)
PR INTERVAL - MUSE: 170 MS
PROCALCITONIN SERPL IA-MCNC: 0.11 NG/ML
PROT SERPL-MCNC: 8.1 G/DL (ref 6.4–8.3)
QRS DURATION - MUSE: 92 MS
QT - MUSE: 348 MS
QTC - MUSE: 470 MS
R AXIS - MUSE: -69 DEGREES
RBC # BLD AUTO: 5.58 10E6/UL (ref 4.4–5.9)
RBC URINE: 2 /HPF
RSV RNA SPEC NAA+PROBE: NEGATIVE
SAO2 % BLDV: 40 % (ref 94–100)
SAO2 % BLDV: 76 % (ref 94–100)
SARS-COV-2 RNA RESP QL NAA+PROBE: NEGATIVE
SODIUM SERPL-SCNC: 138 MMOL/L (ref 136–145)
SP GR UR STRIP: 1.03 (ref 1–1.03)
SYSTOLIC BLOOD PRESSURE - MUSE: NORMAL MMHG
T AXIS - MUSE: 62 DEGREES
TROPONIN T SERPL HS-MCNC: 24 NG/L
TROPONIN T SERPL HS-MCNC: 25 NG/L
UROBILINOGEN UR STRIP-MCNC: NORMAL MG/DL
VENTRICULAR RATE- MUSE: 110 BPM
WBC # BLD AUTO: 14.8 10E3/UL (ref 4–11)
WBC URINE: 4 /HPF

## 2023-09-09 PROCEDURE — 83605 ASSAY OF LACTIC ACID: CPT

## 2023-09-09 PROCEDURE — 99223 1ST HOSP IP/OBS HIGH 75: CPT | Mod: AI | Performed by: HOSPITALIST

## 2023-09-09 PROCEDURE — 71046 X-RAY EXAM CHEST 2 VIEWS: CPT

## 2023-09-09 PROCEDURE — 250N000011 HC RX IP 250 OP 636: Performed by: EMERGENCY MEDICINE

## 2023-09-09 PROCEDURE — 258N000003 HC RX IP 258 OP 636: Performed by: HOSPITALIST

## 2023-09-09 PROCEDURE — 99285 EMERGENCY DEPT VISIT HI MDM: CPT | Mod: 25

## 2023-09-09 PROCEDURE — 250N000011 HC RX IP 250 OP 636: Mod: JZ | Performed by: EMERGENCY MEDICINE

## 2023-09-09 PROCEDURE — 80053 COMPREHEN METABOLIC PANEL: CPT | Performed by: EMERGENCY MEDICINE

## 2023-09-09 PROCEDURE — 82962 GLUCOSE BLOOD TEST: CPT

## 2023-09-09 PROCEDURE — 87637 SARSCOV2&INF A&B&RSV AMP PRB: CPT | Performed by: EMERGENCY MEDICINE

## 2023-09-09 PROCEDURE — 83880 ASSAY OF NATRIURETIC PEPTIDE: CPT | Performed by: HOSPITALIST

## 2023-09-09 PROCEDURE — 250N000011 HC RX IP 250 OP 636: Performed by: HOSPITALIST

## 2023-09-09 PROCEDURE — 87149 DNA/RNA DIRECT PROBE: CPT | Performed by: EMERGENCY MEDICINE

## 2023-09-09 PROCEDURE — 36415 COLL VENOUS BLD VENIPUNCTURE: CPT | Performed by: EMERGENCY MEDICINE

## 2023-09-09 PROCEDURE — 82803 BLOOD GASES ANY COMBINATION: CPT

## 2023-09-09 PROCEDURE — 87086 URINE CULTURE/COLONY COUNT: CPT | Performed by: EMERGENCY MEDICINE

## 2023-09-09 PROCEDURE — 96365 THER/PROPH/DIAG IV INF INIT: CPT

## 2023-09-09 PROCEDURE — 71260 CT THORAX DX C+: CPT | Mod: MG

## 2023-09-09 PROCEDURE — 999N000226 HC STATISTIC SLP IP EVAL DEFER: Performed by: SPEECH-LANGUAGE PATHOLOGIST

## 2023-09-09 PROCEDURE — 86140 C-REACTIVE PROTEIN: CPT | Performed by: HOSPITALIST

## 2023-09-09 PROCEDURE — 87077 CULTURE AEROBIC IDENTIFY: CPT | Performed by: EMERGENCY MEDICINE

## 2023-09-09 PROCEDURE — 36415 COLL VENOUS BLD VENIPUNCTURE: CPT | Performed by: HOSPITALIST

## 2023-09-09 PROCEDURE — 250N000013 HC RX MED GY IP 250 OP 250 PS 637: Performed by: HOSPITALIST

## 2023-09-09 PROCEDURE — 250N000009 HC RX 250: Performed by: EMERGENCY MEDICINE

## 2023-09-09 PROCEDURE — 250N000009 HC RX 250: Performed by: HOSPITALIST

## 2023-09-09 PROCEDURE — 120N000001 HC R&B MED SURG/OB

## 2023-09-09 PROCEDURE — 93005 ELECTROCARDIOGRAM TRACING: CPT

## 2023-09-09 PROCEDURE — 85025 COMPLETE CBC W/AUTO DIFF WBC: CPT | Performed by: EMERGENCY MEDICINE

## 2023-09-09 PROCEDURE — 84145 PROCALCITONIN (PCT): CPT | Performed by: HOSPITALIST

## 2023-09-09 PROCEDURE — 96367 TX/PROPH/DG ADDL SEQ IV INF: CPT

## 2023-09-09 PROCEDURE — 83735 ASSAY OF MAGNESIUM: CPT | Performed by: HOSPITALIST

## 2023-09-09 PROCEDURE — 258N000003 HC RX IP 258 OP 636: Performed by: EMERGENCY MEDICINE

## 2023-09-09 PROCEDURE — 81001 URINALYSIS AUTO W/SCOPE: CPT | Performed by: EMERGENCY MEDICINE

## 2023-09-09 PROCEDURE — 84484 ASSAY OF TROPONIN QUANT: CPT | Performed by: HOSPITALIST

## 2023-09-09 RX ORDER — GUAIFENESIN 600 MG/1
15 TABLET, EXTENDED RELEASE ORAL DAILY
Status: DISCONTINUED | OUTPATIENT
Start: 2023-09-09 | End: 2023-09-13 | Stop reason: HOSPADM

## 2023-09-09 RX ORDER — CARBAMAZEPINE 100 MG/5ML
150 SUSPENSION ORAL 3 TIMES DAILY
Status: DISCONTINUED | OUTPATIENT
Start: 2023-09-09 | End: 2023-09-13 | Stop reason: HOSPADM

## 2023-09-09 RX ORDER — CARBAMAZEPINE 100 MG/5ML
150 SUSPENSION ORAL 3 TIMES DAILY
Status: DISCONTINUED | OUTPATIENT
Start: 2023-09-10 | End: 2023-09-09

## 2023-09-09 RX ORDER — MULTIVITAMIN,THERAPEUTIC
1 TABLET ORAL DAILY
Status: DISCONTINUED | OUTPATIENT
Start: 2023-09-09 | End: 2023-09-11

## 2023-09-09 RX ORDER — ASCORBIC ACID 500 MG
500 TABLET ORAL DAILY
Status: DISCONTINUED | OUTPATIENT
Start: 2023-09-09 | End: 2023-09-13 | Stop reason: HOSPADM

## 2023-09-09 RX ORDER — AMOXICILLIN 250 MG
2 CAPSULE ORAL 2 TIMES DAILY PRN
Status: DISCONTINUED | OUTPATIENT
Start: 2023-09-09 | End: 2023-09-09

## 2023-09-09 RX ORDER — AZELASTINE HYDROCHLORIDE 0.5 MG/ML
1 SOLUTION/ DROPS OPHTHALMIC 2 TIMES DAILY
Status: DISCONTINUED | OUTPATIENT
Start: 2023-09-09 | End: 2023-09-13 | Stop reason: HOSPADM

## 2023-09-09 RX ORDER — POTASSIUM CHLORIDE 20MEQ/15ML
40 LIQUID (ML) ORAL ONCE
Status: COMPLETED | OUTPATIENT
Start: 2023-09-09 | End: 2023-09-09

## 2023-09-09 RX ORDER — CARBAMAZEPINE 100 MG/5ML
100 SUSPENSION ORAL DAILY
Status: DISCONTINUED | OUTPATIENT
Start: 2023-09-09 | End: 2023-09-13 | Stop reason: HOSPADM

## 2023-09-09 RX ORDER — GLYCOPYRROLATE 1 MG/1
1 TABLET ORAL 2 TIMES DAILY PRN
Status: DISCONTINUED | OUTPATIENT
Start: 2023-09-09 | End: 2023-09-09

## 2023-09-09 RX ORDER — AMOXICILLIN 250 MG
1 CAPSULE ORAL 2 TIMES DAILY PRN
Status: DISCONTINUED | OUTPATIENT
Start: 2023-09-09 | End: 2023-09-09

## 2023-09-09 RX ORDER — HEPARIN SODIUM 5000 [USP'U]/.5ML
5000 INJECTION, SOLUTION INTRAVENOUS; SUBCUTANEOUS EVERY 12 HOURS
Status: DISCONTINUED | OUTPATIENT
Start: 2023-09-09 | End: 2023-09-13 | Stop reason: HOSPADM

## 2023-09-09 RX ORDER — B COMPLEX C NO.10/FOLIC ACID 900MCG/5ML
5 LIQUID (ML) ORAL DAILY
Status: DISCONTINUED | OUTPATIENT
Start: 2023-09-09 | End: 2023-09-09

## 2023-09-09 RX ORDER — LEVOTHYROXINE SODIUM 75 UG/1
150 TABLET ORAL DAILY
Status: DISCONTINUED | OUTPATIENT
Start: 2023-09-09 | End: 2023-09-13 | Stop reason: HOSPADM

## 2023-09-09 RX ORDER — CYANOCOBALAMIN (VITAMIN B-12) 2500 MCG
2500 TABLET, SUBLINGUAL SUBLINGUAL DAILY
COMMUNITY
End: 2023-10-25

## 2023-09-09 RX ORDER — GUAIFENESIN 600 MG/1
600 TABLET, EXTENDED RELEASE ORAL 2 TIMES DAILY PRN
Status: DISCONTINUED | OUTPATIENT
Start: 2023-09-09 | End: 2023-09-13 | Stop reason: HOSPADM

## 2023-09-09 RX ORDER — PIPERACILLIN SODIUM, TAZOBACTAM SODIUM 4; .5 G/20ML; G/20ML
4.5 INJECTION, POWDER, LYOPHILIZED, FOR SOLUTION INTRAVENOUS EVERY 6 HOURS
Status: DISCONTINUED | OUTPATIENT
Start: 2023-09-09 | End: 2023-09-12

## 2023-09-09 RX ORDER — CARBAMAZEPINE 100 MG/5ML
100 SUSPENSION ORAL DAILY
Status: DISCONTINUED | OUTPATIENT
Start: 2023-09-09 | End: 2023-09-09

## 2023-09-09 RX ORDER — GLYCOPYRROLATE 1 MG/1
1 TABLET ORAL 2 TIMES DAILY PRN
Status: DISCONTINUED | OUTPATIENT
Start: 2023-09-09 | End: 2023-09-13 | Stop reason: HOSPADM

## 2023-09-09 RX ORDER — SODIUM CHLORIDE 9 MG/ML
INJECTION, SOLUTION INTRAVENOUS CONTINUOUS
Status: DISCONTINUED | OUTPATIENT
Start: 2023-09-09 | End: 2023-09-12

## 2023-09-09 RX ORDER — SODIUM BICARBONATE 650 MG/1
1300 TABLET ORAL EVERY MORNING
Status: DISCONTINUED | OUTPATIENT
Start: 2023-09-10 | End: 2023-09-13 | Stop reason: HOSPADM

## 2023-09-09 RX ORDER — AMOXICILLIN 250 MG
1 CAPSULE ORAL 2 TIMES DAILY PRN
Status: DISCONTINUED | OUTPATIENT
Start: 2023-09-09 | End: 2023-09-13 | Stop reason: HOSPADM

## 2023-09-09 RX ORDER — IOPAMIDOL 755 MG/ML
86 INJECTION, SOLUTION INTRAVASCULAR ONCE
Status: COMPLETED | OUTPATIENT
Start: 2023-09-09 | End: 2023-09-09

## 2023-09-09 RX ORDER — AMOXICILLIN 250 MG
2 CAPSULE ORAL 2 TIMES DAILY PRN
Status: DISCONTINUED | OUTPATIENT
Start: 2023-09-09 | End: 2023-09-13 | Stop reason: HOSPADM

## 2023-09-09 RX ORDER — ONDANSETRON 4 MG/1
4 TABLET, ORALLY DISINTEGRATING ORAL EVERY 6 HOURS PRN
Status: DISCONTINUED | OUTPATIENT
Start: 2023-09-09 | End: 2023-09-13 | Stop reason: HOSPADM

## 2023-09-09 RX ORDER — LIDOCAINE 40 MG/G
CREAM TOPICAL
Status: DISCONTINUED | OUTPATIENT
Start: 2023-09-09 | End: 2023-09-13 | Stop reason: HOSPADM

## 2023-09-09 RX ORDER — SODIUM BICARBONATE 650 MG/1
650 TABLET ORAL EVERY EVENING
Status: DISCONTINUED | OUTPATIENT
Start: 2023-09-10 | End: 2023-09-13 | Stop reason: HOSPADM

## 2023-09-09 RX ORDER — ACETAMINOPHEN 500 MG
500-750 TABLET ORAL EVERY 8 HOURS PRN
COMMUNITY
End: 2023-11-29

## 2023-09-09 RX ORDER — ACETAMINOPHEN 325 MG/1
650 TABLET ORAL EVERY 8 HOURS PRN
Status: DISCONTINUED | OUTPATIENT
Start: 2023-09-09 | End: 2023-09-13 | Stop reason: HOSPADM

## 2023-09-09 RX ORDER — DEXTROSE MONOHYDRATE 100 MG/ML
INJECTION, SOLUTION INTRAVENOUS CONTINUOUS PRN
Status: DISCONTINUED | OUTPATIENT
Start: 2023-09-09 | End: 2023-09-13 | Stop reason: HOSPADM

## 2023-09-09 RX ORDER — PIPERACILLIN SODIUM, TAZOBACTAM SODIUM 3; .375 G/15ML; G/15ML
3.38 INJECTION, POWDER, LYOPHILIZED, FOR SOLUTION INTRAVENOUS ONCE
Status: COMPLETED | OUTPATIENT
Start: 2023-09-09 | End: 2023-09-09

## 2023-09-09 RX ORDER — HYDROCORTISONE 5 MG/1
5 TABLET ORAL EVERY 24 HOURS
Status: DISCONTINUED | OUTPATIENT
Start: 2023-09-10 | End: 2023-09-09

## 2023-09-09 RX ORDER — ONDANSETRON 2 MG/ML
4 INJECTION INTRAMUSCULAR; INTRAVENOUS EVERY 6 HOURS PRN
Status: DISCONTINUED | OUTPATIENT
Start: 2023-09-09 | End: 2023-09-13 | Stop reason: HOSPADM

## 2023-09-09 RX ADMIN — PIPERACILLIN AND TAZOBACTAM 4.5 G: 4; .5 INJECTION, POWDER, FOR SOLUTION INTRAVENOUS at 18:17

## 2023-09-09 RX ADMIN — IOPAMIDOL 86 ML: 755 INJECTION, SOLUTION INTRAVENOUS at 15:30

## 2023-09-09 RX ADMIN — LEVOTHYROXINE SODIUM 150 MCG: 75 TABLET ORAL at 18:16

## 2023-09-09 RX ADMIN — Medication 15 ML: at 13:59

## 2023-09-09 RX ADMIN — SODIUM CHLORIDE 100 ML/HR: 9 INJECTION, SOLUTION INTRAVENOUS at 18:17

## 2023-09-09 RX ADMIN — AZELASTINE HYDROCHLORIDE 1 DROP: 0.5 SOLUTION/ DROPS INTRAOCULAR at 21:56

## 2023-09-09 RX ADMIN — THERA TABS 1 TABLET: TAB at 18:16

## 2023-09-09 RX ADMIN — HEPARIN SODIUM 5000 UNITS: 5000 INJECTION, SOLUTION INTRAVENOUS; SUBCUTANEOUS at 18:16

## 2023-09-09 RX ADMIN — SODIUM CHLORIDE 1000 ML: 9 INJECTION, SOLUTION INTRAVENOUS at 07:56

## 2023-09-09 RX ADMIN — SODIUM CHLORIDE 1000 ML: 9 INJECTION, SOLUTION INTRAVENOUS at 06:12

## 2023-09-09 RX ADMIN — PIPERACILLIN AND TAZOBACTAM 4.5 G: 4; .5 INJECTION, POWDER, FOR SOLUTION INTRAVENOUS at 13:24

## 2023-09-09 RX ADMIN — SODIUM CHLORIDE 66 ML: 9 INJECTION, SOLUTION INTRAVENOUS at 15:30

## 2023-09-09 RX ADMIN — OXYCODONE HYDROCHLORIDE AND ACETAMINOPHEN 500 MG: 500 TABLET ORAL at 14:00

## 2023-09-09 RX ADMIN — POTASSIUM CHLORIDE 40 MEQ: 20 SOLUTION ORAL at 14:00

## 2023-09-09 RX ADMIN — BRIVARACETAM 100 MG: 10 SOLUTION ORAL at 14:51

## 2023-09-09 RX ADMIN — CARBAMAZEPINE 100 MG: 100 SUSPENSION ORAL at 18:15

## 2023-09-09 RX ADMIN — HYDROCORTISONE 15 MG: 10 TABLET ORAL at 21:57

## 2023-09-09 RX ADMIN — PIPERACILLIN AND TAZOBACTAM 3.38 G: 3; .375 INJECTION, POWDER, FOR SOLUTION INTRAVENOUS at 06:06

## 2023-09-09 RX ADMIN — CARBAMAZEPINE 150 MG: 100 SUSPENSION ORAL at 14:51

## 2023-09-09 RX ADMIN — HYDROCORTISONE 15 MG: 10 TABLET ORAL at 18:16

## 2023-09-09 RX ADMIN — VANCOMYCIN HYDROCHLORIDE 1750 MG: 10 INJECTION, POWDER, LYOPHILIZED, FOR SOLUTION INTRAVENOUS at 08:10

## 2023-09-09 ASSESSMENT — ACTIVITIES OF DAILY LIVING (ADL)
ADLS_ACUITY_SCORE: 59
ADLS_ACUITY_SCORE: 37

## 2023-09-09 NOTE — PHARMACY-VANCOMYCIN DOSING SERVICE
"Pharmacy Vancomycin Initial Note  Date of Service 2023  Patient's  1960  63 year old, male    Indication: Sepsis    Current estimated CrCl = Estimated Creatinine Clearance: 57.7 mL/min (A) (based on SCr of 1.49 mg/dL (H)).    Creatinine for last 3 days  2023:  4:08 AM Creatinine 1.49 mg/dL    Recent Vancomycin Level(s) for last 3 days  No results found for requested labs within last 3 days.      Vancomycin IV Administrations (past 72 hours)                     vancomycin (VANCOCIN) 1,750 mg in 0.9% NaCl 500 mL intermittent infusion (mg) 1,750 mg Given 23 0810                    Nephrotoxins and other renal medications (From now, onward)      Start     Dose/Rate Route Frequency Ordered Stop    09/10/23 0800  vancomycin (VANCOCIN) 1,500 mg in 0.9% NaCl 250 mL intermittent infusion         1,500 mg  over 90 Minutes Intravenous EVERY 24 HOURS 23 1417      23 1200  piperacillin-tazobactam (ZOSYN) 4.5 g vial to attach to  mL bag        Note to Pharmacy: For [facility], [facility] and [facility]: For Zosyn-naive patients, use the \"Zosyn initial dose + extended infusion\" order panel.    4.5 g  over 30 Minutes Intravenous EVERY 6 HOURS 23 0831              Contrast Orders - past 72 hours (72h ago, onward)      None            [employer] Prediction of Planned Initial Vancomycin Regimen  Loading dose: 1750 mg 23 at 0810  Regimen: 1500 mg IV every 24 hours.  Start time: 0800 on 9/10/2023  Exposure target: AUC24 (range)400-600 mg/L.hr   AUC24,ss: 577 mg/L.hr  Probability of AUC24 > 400: 87 %  Ctrough,ss: 17.3 mg/L  Probability of Ctrough,ss > 20: 36 %  Probability of nephrotoxicity (Lodise ERNESTO ): 13 %          Plan:  Start vancomycin  1500 mg IV q24h.   Vancomycin monitoring method: AUC  Vancomycin therapeutic monitoring goal: 400-600 mg*h/L  Pharmacy will check vancomycin levels as appropriate in 1-3 Days.    Serum creatinine levels will be ordered daily for the first week of therapy and " at least twice weekly for subsequent weeks.      Courtney Jackson, [facility]

## 2023-09-09 NOTE — ED PROVIDER NOTES
History     Chief Complaint:  Fever       The history is provided by a parent.      Keyon Farias is a non verbal 63 year old male with a history of hyperlipidemia, MRSA and GERD who presents via EMS with fever. Mom called because patient had fever and was hypoxic. Mom gave patient tylenol at home.     Independent Historian:   Parent - They report above history.    Review of External Notes:   Discharge summary dated 8/12/2020 through the patient was admitted for concern for aspiration, no signs of sepsis at that time.      Medications:    Albuterol  Robinul   Cortef   Synthroid   Cholecalciferol   Reglan     Past Medical History:    DVT   Asthma   Hyperlipidemia   Pneumonia   Seizures   GERD  Thyroid disease  Panhypopituitarism  Ventricular tachyarrhythmia  Leukocytosis   Cardiac arrest   TBI     Past Surgical History:    EGD x 4  Vascular surgery   Tracheostomy x2   Laparoscopic appendectomy     Physical Exam   Patient Vitals for the past 24 hrs:   BP Temp Temp src Pulse Resp SpO2   09/09/23 0800 94/69 -- -- 108 -- --   09/09/23 0700 103/61 -- -- 104 -- 93 %   09/09/23 0645 110/65 -- -- 105 -- 96 %   09/09/23 0630 97/66 -- -- 107 -- 93 %   09/09/23 0615 (!) 87/67 -- -- 119 -- 96 %   09/09/23 0600 (!) 82/68 -- -- 111 -- 95 %   09/09/23 0500 107/71 -- -- 111 -- 99 %   09/09/23 0400 101/71 -- -- 114 -- 91 %   09/09/23 0300 104/69 -- -- 116 -- 96 %   09/09/23 0258 -- -- -- -- -- 97 %   09/09/23 0254 -- -- -- -- -- (!) 89 %   09/09/23 0253 (!) 81/63 98.8  F (37.1  C) Oral 116 20 93 %        Physical Exam  Constitutional: Nontoxic appearing.  HEENT: Atraumatic.  PERRL.  EOMI.  Moist mucous membranes.  Neck: Soft.  Supple.  No JVD.  Cardiac: Regular rate and rhythm.  No murmur or rub.  Respiratory: Clear to auscultation bilaterally.  No respiratory distress.  No wheezing, rhonchi, or rales.  Abdomen: Soft and nontender.  No guarding.  Nondistended.  Musculoskeletal: No edema.  Normal range of motion.  Neurologic: Alert  and oriented nods yes or no.  Skin: No rashes.  No edema.  Psych: Nonverbal.      Emergency Department Course   ECG  ECG results from 09/09/23   EKG 12-lead, tracing only     Value    Systolic Blood Pressure     Diastolic Blood Pressure     Ventricular Rate 110    Atrial Rate 110    ID Interval 170    QRS Duration 92        QTc 470    P Axis 62    R AXIS -69    T Axis 62    Interpretation ECG      Sinus tachycardia  Left anterior fascicular block  Abnormal ECG  When compared with ECG of 07-AUG-2023 23:47,  No significant change was found       *Note: Due to a large number of results and/or encounters for the requested time period, some results have not been displayed. A complete set of results can be found in Results Review.         Imaging:  XR Chest 2 Views   Final Result   IMPRESSION: PA and lateral views of the chest were obtained. Cardiomediastinal silhouette is within normal limits. Basilar pulmonary opacities, likely atelectasis. No significant pleural effusion or pneumothorax.                    Report per radiology    Laboratory:  Labs Ordered and Resulted from Time of ED Arrival to Time of ED Departure   COMPREHENSIVE METABOLIC PANEL - Abnormal       Result Value    Sodium 138      Potassium 3.2 (*)     Chloride 88 (*)     Carbon Dioxide (CO2) 37 (*)     Anion Gap 13      Urea Nitrogen 31.3 (*)     Creatinine 1.49 (*)     Calcium 9.1      Glucose 106 (*)     Alkaline Phosphatase 112      AST 26      ALT 20      Protein Total 8.1      Albumin 4.1      Bilirubin Total 0.6      GFR Estimate 53 (*)    CBC WITH PLATELETS AND DIFFERENTIAL - Abnormal    WBC Count 14.8 (*)     RBC Count 5.58      Hemoglobin 16.2      Hematocrit 50.0      MCV 90      MCH 29.0      MCHC 32.4      RDW 14.3      Platelet Count 173      % Neutrophils 64      % Lymphocytes 21      % Monocytes 9      % Eosinophils 4      % Basophils 1      % Immature Granulocytes 1      NRBCs per 100 WBC 0      Absolute Neutrophils 9.7 (*)      Absolute Lymphocytes 3.1      Absolute Monocytes 1.3      Absolute Eosinophils 0.5      Absolute Basophils 0.1      Absolute Immature Granulocytes 0.1      Absolute NRBCs 0.0     ISTAT GASES LACTATE VENOUS POCT - Abnormal    Lactic Acid POCT 2.4 (*)     Bicarbonate Venous POCT 41 (*)     O2 Sat, Venous POCT 76 (*)     pCO2 Venous POCT 55 (*)     pH Venous POCT 7.47 (*)     pO2 Venous POCT 40     ROUTINE UA WITH MICROSCOPIC REFLEX TO CULTURE - Abnormal    Color Urine Yellow      Appearance Urine Clear      Glucose Urine Negative      Bilirubin Urine Negative      Ketones Urine Negative      Specific Gravity Urine 1.027      Blood Urine Negative      pH Urine 8.0 (*)     Protein Albumin Urine 50 (*)     Urobilinogen Urine Normal      Nitrite Urine Negative      Leukocyte Esterase Urine Negative      Mucus Urine Present (*)     RBC Urine 2      WBC Urine 4      Hyaline Casts Urine 7 (*)    ISTAT GASES LACTATE VENOUS POCT - Abnormal    Lactic Acid POCT 2.5 (*)     Bicarbonate Venous POCT 40 (*)     O2 Sat, Venous POCT 40 (*)     pCO2 Venous POCT 66 (*)     pH Venous POCT 7.38      pO2 Venous POCT 24 (*)    INFLUENZA A/B, RSV, & SARS-COV2 PCR - Normal    Influenza A PCR Negative      Influenza B PCR Negative      RSV PCR Negative      SARS CoV2 PCR Negative     LACTIC ACID WHOLE BLOOD   BLOOD CULTURE   BLOOD CULTURE   URINE CULTURE        Emergency Department Course & Assessments:           Interventions:  Medications   vancomycin (VANCOCIN) 1,750 mg in 0.9% NaCl 500 mL intermittent infusion (1,750 mg Intravenous $Given 9/9/23 0810)   0.9% sodium chloride BOLUS (1,000 mLs Intravenous $New Bag 9/9/23 0756)   piperacillin-tazobactam (ZOSYN) 3.375 g vial to attach to  mL bag (0 g Intravenous Stopped 9/9/23 0625)   0.9% sodium chloride BOLUS (0 mLs Intravenous Stopped 9/9/23 0756)        Assessments:  0620 I obtained history and examined the patient as noted above.   0742 I rechecked the patient.     Independent  Interpretation (X-rays, CTs, rhythm strip):  Chest x-ray without acute obvious infiltrate or pneumothorax    Consultations/Discussion of Management or Tests:  7403 I spoke with the patients mom/guardian regarding the patient's history and presentation in the emergency department today.   0811 I spoke with Dr. Dasilva of the hospitalist team regarding the patient, who accepted the patient for admission.            Social Determinants of Health affecting care:   None    Disposition:  The patient was admitted to the hospital under the care of Dr. Dasliva.     Impression & Plan    Medical Decision Making:  Keyon Farias is a 63-year-old man who presents hypotensive and hypoxic.  Is nonverbal which is baseline.  Reviewed his history of multiple admissions for sepsis concern for aspiration pneumonia.  Chest x-ray with bibasilar atelectasis.  COVID-negative.  He has mild leukocytosis and lactic acidosis.  He was treated empirically with broad-spectrum antibiotics in the form vancomycin and Zosyn, blood cultures obtained, and repeat lactic acid is improving after IV fluids.  Blood pressure normalized.  He is hypoxic and stabilized on nasal cannula oxygen.  I spoke with his mother who is in agreement with admission.  I spoke with hospital service who accepts for admission and he is in stable condition awaiting transport to the floor.    Addendum: Awaiting CT chest, abdomen, and pelvis per medicine request.    Diagnosis:    ICD-10-CM    1. Acute respiratory failure with hypoxia (H)  J96.01       2. Sepsis, due to unspecified organism, unspecified whether acute organ dysfunction present (H)  A41.9              Scribe Disclosure:  CINDY DYKES PRINCESS, am serving as a scribe at 6:10 AM on 9/9/2023 to document services personally performed by Robb Foley MD based on my observations and the provider's statements to me.     9/9/2023   Robb Foley MD Salay, Nicholas J, MD  09/11/23 2642

## 2023-09-09 NOTE — ED NOTES
[facility]  ED Nurse Handoff Report    ED Chief complaint: Fever      ED Diagnosis:   Final diagnoses:   None       Code Status: to be discussed    Allergies:   Allergies   Allergen Reactions    Valproic Acid Other (See Comments)     Toxicity w/ bone marrow suspension, elevated ammonia levels     Dilantin [Phenytoin Sodium] Other (See Comments)     Severe Trembling    Scopolamine Hives     Hives with the patch - oral no problem       Patient Story: Lives at home with mother, had fever this afternoon got worried called 911  Focused Assessment:  No complaints of pain, no fever    Treatments and/or interventions provided: scans, labs, meds  Patient's response to treatments and/or interventions: admitted    To be done/followed up on inpatient unit:  admitted    Does this patient have any cognitive concerns?:  unable does not speak    Activity level - Baseline/Home:  Total Care  Activity Level - Current:   Total Care    Patient's Preferred language: English   Needed?: No    Isolation: MRSA,VRE    Infection: Not Applicable  Patient tested for COVID 19 prior to admission: YES  Bariatric?: No    Vital Signs:   Vitals:    09/09/23 0600 09/09/23 0615 09/09/23 0630 09/09/23 0645   BP: (!) 82/68 (!) 87/67 97/66 110/65   Pulse: 111 119 107 105   Resp:       Temp:       TempSrc:       SpO2: 95% 96% 93% 96%       Cardiac Rhythm:     Was the PSS-3 completed:   No -  What interventions are required if any?               Family Comments: Mom wanted to be called with updates.  OBS brochure/video discussed/provided to patient/family: N/A              Name of person given brochure if not patient: n/a              Relationship to patient: n/a    For the majority of the shift this patient's behavior was Green.   Behavioral interventions performed were n/a.    ED NURSE PHONE NUMBER: [phone]
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
## 2023-09-09 NOTE — PROGRESS NOTES
RECEIVING UNIT ED HANDOFF REVIEW    ED Nurse Handoff Report was reviewed by: Courtney Beyer RN on September 9, 2023 at 3:02 PM

## 2023-09-09 NOTE — ED NOTES
Tube feeding running at 55ml/hr. Pt had a yellow small emesis, mom at bedside. HOB elevated to 75 degrees at this time.

## 2023-09-09 NOTE — PLAN OF CARE
SLP: Orders received. Chart reviewed and MD contacted.  SLP not indicated due to baseline severe dysphagia.  Pt is well known to SLP at [facility] due to multiple aspiration pneumonia episodes and severe baseline dysphagia. Pt has not been safe for po intake for multiple years. If aspiration of TF concerns are present, recommend GI consult to assess for reflux/GI issues. SLP will sign off as pt has not been able to benefit from SLP intervention. Defer discharge recommendations to medical care team.  Will complete orders.

## 2023-09-09 NOTE — PHARMACY-ADMISSION MEDICATION HISTORY
Pharmacist Admission Medication History    Admission medication history is complete. The information provided in this note is only as accurate as the sources available at the time of the update.    Medication reconciliation/reorder completed by provider prior to medication history? Yes    Information Source(s): Family member and CareEverywhere/SureScripts via phone    Pertinent Information:   Mother increased sodium bicarbonate to 2 tabs in am and 1 in pm as pt sodium was low. She reports that she discussed the change with a provider.    Pt was previously on Scopolamine caps 0.8mg, so mother increased back to this dose when 0.4mg was not effective.   Mother gave pt Robitussin recently due to congestion.  Recently completed maxitrol eye drops    Changes made to PTA medication list:  Added: b12  Deleted: None  Changed: apap, scopolamine, hydrocortisone, sodium bicarb. Marked maxitrol as not taking      Medication Affordability:  Not including over the counter (OTC) medications, was there a time in the past 3 months when you did not take your medications as prescribed because of cost?: No    Allergies reviewed with patient and updates made in EHR: yes    Medication History Completed By: Jean Paul Sarabia RPH 9/9/2023 2:13 PM    Prior to Admission medications    Medication Sig Last Dose Taking? Auth Provider Long Term End Date   acetaminophen (TYLENOL) 500 MG tablet Take 500-750 mg by mouth every 8 hours as needed for mild pain 9/8/2023 at 2100 Yes Unknown, Entered By History     albuterol (PROVENTIL) (5 MG/ML) 0.5% neb solution Take 0.5 mLs (2.5 mg) by nebulization every 6 hours as needed for shortness of breath, wheezing or cough 0700 1100 1500 1900 with mucomyst Unknown Yes Edita Pope, DO Yes    azelastine (OPTIVAR) 0.05 % ophthalmic solution INSTILL 1 DROP IN AFFECTED EYE(S) TWICE DAILY FOR 10 DAYS 9/8/2023 Yes Elsa Queen MD     bacitracin 500 UNIT/GM external ointment Apply topically daily as needed for wound  care To PEG site. Unknown Yes Elsa Queen MD     Brivaracetam (BRIVIACT) 10 MG/ML solution 100 mg by Oral or Feeding Tube route 2 times daily 0900, 2100 9/8/2023 at 2100 Yes Unknown, Entered By History No    carBAMazepine (TEGRETOL) 100 MG/5ML suspension Take 100 mg by mouth daily Take at 1800 9/8/2023 at 1800 Yes Unknown, Entered By History Yes    carBAMazepine (TEGRETOL) 100 MG/5ML suspension 150 mg by Oral or Feeding Tube route 3 times daily At 06:00, 12:00, and 24:00 for seizures 9/9/2023 at 0000 Yes Unknown, Entered By History Yes    COMPOUNDED NON-CONTROLLED SUBSTANCE (CMPD RX) - PHARMACY TO MIX COMPOUNDED MEDICATION Scopolamine 0.4mg capsules - take 1 capsule by feeding tube three times daily as needed  Patient taking differently: Scopolamine 0.4mg capsules - take  2 capsule by feeding tube three times daily as needed 9/8/2023 Yes Elsa Queen MD     glycopyrrolate (ROBINUL) 1 MG tablet Take 1 tablet (1 mg) by mouth 2 times daily as needed (secretions) 9/8/2023 Yes Elsa Queen MD No    guaiFENesin (MUCINEX) 600 MG 12 hr tablet Take 1 tablet (600 mg) by mouth 2 times daily as needed for congestion 9/8/2023 Yes Edita Pope DO No    hydrocortisone (CORTAID) 1 % external cream Apply topically 2 times daily as needed Apply to reddened memo areas as needed Unknown Yes Unknown, Entered By History     hydrocortisone (CORTEF) 5 MG tablet Take 15 mg (3 tablets) in the morning and 7.5 mg (1.5 tablet)  at 2:00 PM. During illness patient takes more as a stress dose. Please increase the dose as directed.  Patient taking differently: Take 15 mg (3 tablets) in the morning and 5 mg (1 tablet)  at 2:00 PM. During illness patient takes more as a stress dose. Please increase the dose as directed. 9/8/2023 at pm Yes Faizan Mclean MD Yes    levothyroxine (SYNTHROID/LEVOTHROID) 150 MCG tablet TAKE 1 TABLET(150 MCG) BY MOUTH DAILY 9/8/2023 at am Yes Faizan Mclean MD Yes    metoclopramide (REGLAN) 10 MG/10ML  "SOLN solution Take 10 mLs (10 mg) by mouth 4 times daily (before meals and nightly) 0800, 1200, 1600, 2000 Disconnects bag before administration, then waits 45 mins before reconnecting after giving the medication 9/8/2023 at am Yes Elsa Queen MD     multivitamin, therapeutic (THERA-VIT) TABS tablet Take 1 tablet by mouth daily 9/8/2023 at am Yes Reported, Patient     mupirocin (BACTROBAN) 2 % external ointment APPLY TOPICALLY TO THE AFFECTED AREA TWICE DAILY AS NEEDED Unknown Yes Elsa Queen MD     pantoprazole (PROTONIX) 2 mg/mL SUSP suspension TAKE 20ML PER FEEDING TUBE DAILY 9/8/2023 Yes Elsa Queen MD     sodium bicarbonate 650 MG tablet TAKE 1 TABLET(650 MG) BY MOUTH TWICE DAILY  Patient taking differently: Take by mouth 2 times daily 1300 mg in the morning.  650 mg in the afternoon 9/8/2023 Yes Elsa Queen MD     testosterone cypionate (DEPOTESTOSTERONE) 200 MG/ML injection Inject 0.25 mLs (50 mg) into the muscle once a week 9/5/2023 Yes Faizan Mclean MD Yes    vitamin B-12 (CYANOCOBALAMIN) 2500 MCG sublingual tablet Take 2,500 mcg by mouth daily 9/8/2023 at am Yes Unknown, Entered By History     vitamin C (ASCORBIC ACID) 1000 MG TABS 1,000 mg by Oral or Feeding Tube route daily  9/8/2023 at am Yes Unknown, Entered By History     vitamin D3 (CHOLECALCIFEROL) 2000 units (50 mcg) tablet Take 2,000 Units by mouth daily Crush and feed via j-tube @@ 0900 9/8/2023 at am Yes Unknown, Entered By History No    insulin syringe-needle U-100 (29G X 1/2\" 1 ML) 29G X 1/2\" 1 ML miscellaneous Syringe needle use as needed   Elsa Queen MD     neomycin-polymixin-dexamethasone (MAXITROL) 0.1 % ophthalmic suspension Use in both eyes every 4 hours for 7 days  Patient not taking: Reported on 9/9/2023 Not Taking  Elsa Queen MD         "

## 2023-09-09 NOTE — CONSULTS
CLINICAL NUTRITION SERVICES  -  ASSESSMENT NOTE      Recommendations Ordered by Registered Dietitian (RD): Resume home TF regimen of Jevity 1.5 at 55 mL/hr= 1980 kcal (25 kcal/kg), 84 g protein (1.1 g/kg), 28 g fiber, 1003 mL H20  Flushes per MD order of 150 mL every 4 hours   Will discontinue the Nephronex as patient has MVI also ordered   Malnutrition: % Weight Loss:  Unable to eval - no new weight on file   % Intake:  Decreased intake does not meet criteria for malnutrition (has been on home TF)  Subcutaneous Fat Loss:  Unable to observe - boarding in ED  Muscle Loss:  Unable to observe - boarding in ED   Fluid Retention:  None noted    Malnutrition Diagnosis: Unable to determine due to no current weight on file and patient not available for exam         REASON FOR ASSESSMENT  Keyon Farias is a 63 year old male seen by Registered Dietitian for Provider Order - Registered Dietitian to Assess and Order TF per Medical Nutrition Therapy Protocol      NUTRITION HISTORY  - Information obtained from [facility] as patient is very well known to our services for multiple past hospitalizations.  He has an existing G-J tube that he uses for feedings and was recently exchanged last admit (8/2023).  Last month's TF regimen was as follows ~    Nutrition Support Enteral:  Type of Feeding Tube: G-J  Enteral Frequency:  Continuous  Enteral Regimen: Jevity 1.5 at 55 mL/hr  Total Enteral Provisions: 1980 kcal, 84 g protein, 28 g fiber, 1003 mL H20    - Patient now in ED with admission for fever, cough, and hypotension.      CURRENT NUTRITION ORDERS  Diet Order:     NPO  Noted MD ordered for Jevity 1.5 at 70 mL/hr = 2520 kcal, 108 g protein   Flushes 150 mL every 4 hours     Current Intake/Tolerance:  N/A      NUTRITION FOCUSED PHYSICAL ASSESSMENT FOR DIAGNOSING MALNUTRITION)  No:  Patient not available (boarding in the ED)                Observed:    Unable     Obtained from Chart/Interdisciplinary Team:  None     ANTHROPOMETRICS  Height:  "6'0\"  Weight: 78.4 kg (173#) - Most recent weight in EPIC from last admit (8/12/23)  BMI: 23.5 kg/m^2  Weight Status:  Normal BMI  IBW: 80.9 kg   % IBW: 97%  Weight History:   Wt Readings from Last 10 Encounters:   08/12/23 78.4 kg (172 lb 13.5 oz)   05/10/23 72.6 kg (160 lb)   04/06/23 79.4 kg (175 lb)   03/10/23 73.9 kg (163 lb)   02/15/23 73.9 kg (163 lb)   01/23/23 63.5 kg (140 lb)   01/09/23 63.5 kg (140 lb)   11/28/22 64.4 kg (141 lb 14.4 oz)   06/27/22 69 kg (152 lb 1.9 oz)   06/07/22 71 kg (156 lb 8 oz)     Weight was up last admit but no new weight in the last month so unable to determine if weight loss has been present     LABS  K 3.2 (L) - Getting replaced   BUN 31.3 (H), Cr 1.49 (H)   CRP 91.04 (H)    MEDICATIONS  KCl for replacement   MVI, Nephronex   Vitamin C       ASSESSED NUTRITION NEEDS PER APPROVED PRACTICE GUIDELINES:    Dosing Weight 78.4  kg (8/12)  Estimated Energy Needs: 1313-6624 kcals (23-25 Kcal/Kg)  Justification: maintenance and quadriplegia   Estimated Protein Needs:  grams protein (1.1-1.3 g pro/Kg)  Justification: hypercatabolism with acute illness  Estimated Fluid Needs: 3172-3022 mL (1 mL/Kcal)  Justification: maintenance    MALNUTRITION:  % Weight Loss:  Unable to eval - no new weight on file   % Intake:  Decreased intake does not meet criteria for malnutrition (has been on home TF)  Subcutaneous Fat Loss:  Unable to observe - boarding in ED  Muscle Loss:  Unable to observe - boarding in ED   Fluid Retention:  None noted    Malnutrition Diagnosis: Unable to determine due to no current weight on file and patient not available for exam     NUTRITION DIAGNOSIS:  Inadequate protein-energy intake related to TF to resume today as evidenced by currently meeting 0% needs       NUTRITION INTERVENTIONS  Recommendations / Nutrition Prescription  Resume home TF regimen of Jevity 1.5 at 55 mL/hr= 1980 kcal (25 kcal/kg), 84 g protein (1.1 g/kg), 28 g fiber, 1003 mL H20  Flushes per MD " order of 150 mL every 4 hours   Will discontinue the Nephronex as patient has MVI also ordered    Implementation  Nutrition education: Not appropriate at this time due to patient condition  EN Composition and Multivitamin/Mineral:  Adjusted as above     Nutrition Goals  Patient will meet % needs from TF regimen     MONITORING AND EVALUATION:  Progress towards goals will be monitored and evaluated per protocol and Practice Guidelines    Swati Parrish RD, LD, CNSC   Clinical Dietitian - [facility]

## 2023-09-10 LAB
ANION GAP SERPL CALCULATED.3IONS-SCNC: 11 MMOL/L (ref 7–15)
BUN SERPL-MCNC: 17.3 MG/DL (ref 8–23)
CALCIUM SERPL-MCNC: 8.2 MG/DL (ref 8.8–10.2)
CHLORIDE SERPL-SCNC: 106 MMOL/L (ref 98–107)
CREAT SERPL-MCNC: 1.06 MG/DL (ref 0.67–1.17)
DEPRECATED HCO3 PLAS-SCNC: 24 MMOL/L (ref 22–29)
EGFRCR SERPLBLD CKD-EPI 2021: 80 ML/MIN/1.73M2
ENTEROCOCCUS FAECALIS: NOT DETECTED
ENTEROCOCCUS FAECIUM: NOT DETECTED
ERYTHROCYTE [DISTWIDTH] IN BLOOD BY AUTOMATED COUNT: 14.5 % (ref 10–15)
GLUCOSE SERPL-MCNC: 164 MG/DL (ref 70–99)
HCT VFR BLD AUTO: 39.7 % (ref 40–53)
HGB BLD-MCNC: 12.7 G/DL (ref 13.3–17.7)
LISTERIA SPECIES (DETECTED/NOT DETECTED): NOT DETECTED
MCH RBC QN AUTO: 29.6 PG (ref 26.5–33)
MCHC RBC AUTO-ENTMCNC: 32 G/DL (ref 31.5–36.5)
MCV RBC AUTO: 93 FL (ref 78–100)
PLATELET # BLD AUTO: 123 10E3/UL (ref 150–450)
POTASSIUM SERPL-SCNC: 4.4 MMOL/L (ref 3.4–5.3)
RBC # BLD AUTO: 4.29 10E6/UL (ref 4.4–5.9)
SODIUM SERPL-SCNC: 141 MMOL/L (ref 136–145)
STAPHYLOCOCCUS AUREUS: NOT DETECTED
STAPHYLOCOCCUS EPIDERMIDIS: NOT DETECTED
STAPHYLOCOCCUS LUGDUNENSIS: NOT DETECTED
STAPHYLOCOCCUS SPECIES: DETECTED
STREPTOCOCCUS AGALACTIAE: NOT DETECTED
STREPTOCOCCUS ANGINOSUS GROUP: NOT DETECTED
STREPTOCOCCUS PNEUMONIAE: NOT DETECTED
STREPTOCOCCUS PYOGENES: NOT DETECTED
STREPTOCOCCUS SPECIES: NOT DETECTED
WBC # BLD AUTO: 6.9 10E3/UL (ref 4–11)

## 2023-09-10 PROCEDURE — 999N000147 HC STATISTIC PT IP EVAL DEFER: Performed by: PHYSICAL THERAPIST

## 2023-09-10 PROCEDURE — 250N000011 HC RX IP 250 OP 636: Performed by: EMERGENCY MEDICINE

## 2023-09-10 PROCEDURE — 999N000128 HC STATISTIC PERIPHERAL IV START W/O US GUIDANCE

## 2023-09-10 PROCEDURE — 99233 SBSQ HOSP IP/OBS HIGH 50: CPT | Performed by: HOSPITALIST

## 2023-09-10 PROCEDURE — 258N000003 HC RX IP 258 OP 636: Performed by: EMERGENCY MEDICINE

## 2023-09-10 PROCEDURE — 250N000011 HC RX IP 250 OP 636: Performed by: HOSPITALIST

## 2023-09-10 PROCEDURE — 82310 ASSAY OF CALCIUM: CPT | Performed by: HOSPITALIST

## 2023-09-10 PROCEDURE — 258N000003 HC RX IP 258 OP 636: Performed by: HOSPITALIST

## 2023-09-10 PROCEDURE — 120N000001 HC R&B MED SURG/OB

## 2023-09-10 PROCEDURE — 85027 COMPLETE CBC AUTOMATED: CPT | Performed by: HOSPITALIST

## 2023-09-10 PROCEDURE — 36415 COLL VENOUS BLD VENIPUNCTURE: CPT | Performed by: HOSPITALIST

## 2023-09-10 PROCEDURE — 250N000013 HC RX MED GY IP 250 OP 250 PS 637: Performed by: HOSPITALIST

## 2023-09-10 RX ADMIN — HYDROCORTISONE 15 MG: 10 TABLET ORAL at 09:36

## 2023-09-10 RX ADMIN — LEVOTHYROXINE SODIUM 150 MCG: 75 TABLET ORAL at 09:36

## 2023-09-10 RX ADMIN — PIPERACILLIN AND TAZOBACTAM 4.5 G: 4; .5 INJECTION, POWDER, FOR SOLUTION INTRAVENOUS at 00:24

## 2023-09-10 RX ADMIN — CARBAMAZEPINE 150 MG: 100 SUSPENSION ORAL at 06:18

## 2023-09-10 RX ADMIN — VANCOMYCIN HYDROCHLORIDE 1500 MG: 10 INJECTION, POWDER, LYOPHILIZED, FOR SOLUTION INTRAVENOUS at 10:40

## 2023-09-10 RX ADMIN — THERA TABS 1 TABLET: TAB at 09:36

## 2023-09-10 RX ADMIN — PIPERACILLIN AND TAZOBACTAM 4.5 G: 4; .5 INJECTION, POWDER, FOR SOLUTION INTRAVENOUS at 17:40

## 2023-09-10 RX ADMIN — HEPARIN SODIUM 5000 UNITS: 5000 INJECTION, SOLUTION INTRAVENOUS; SUBCUTANEOUS at 06:04

## 2023-09-10 RX ADMIN — PIPERACILLIN AND TAZOBACTAM 4.5 G: 4; .5 INJECTION, POWDER, FOR SOLUTION INTRAVENOUS at 12:36

## 2023-09-10 RX ADMIN — BRIVARACETAM 100 MG: 10 SOLUTION ORAL at 00:45

## 2023-09-10 RX ADMIN — CARBAMAZEPINE 100 MG: 100 SUSPENSION ORAL at 17:42

## 2023-09-10 RX ADMIN — FAMOTIDINE 20 MG: 10 INJECTION INTRAVENOUS at 10:41

## 2023-09-10 RX ADMIN — SODIUM BICARBONATE 650 MG TABLET 1300 MG: at 09:36

## 2023-09-10 RX ADMIN — HYDROCORTISONE 15 MG: 10 TABLET ORAL at 17:40

## 2023-09-10 RX ADMIN — AZELASTINE HYDROCHLORIDE 1 DROP: 0.5 SOLUTION/ DROPS INTRAOCULAR at 21:08

## 2023-09-10 RX ADMIN — CARBAMAZEPINE 150 MG: 100 SUSPENSION ORAL at 02:04

## 2023-09-10 RX ADMIN — SODIUM BICARBONATE 650 MG TABLET 650 MG: at 21:07

## 2023-09-10 RX ADMIN — BRIVARACETAM 100 MG: 10 SOLUTION ORAL at 09:28

## 2023-09-10 RX ADMIN — Medication 40 MG: at 06:17

## 2023-09-10 RX ADMIN — BRIVARACETAM 100 MG: 10 SOLUTION ORAL at 22:50

## 2023-09-10 RX ADMIN — AZELASTINE HYDROCHLORIDE 1 DROP: 0.5 SOLUTION/ DROPS INTRAOCULAR at 10:46

## 2023-09-10 RX ADMIN — SODIUM CHLORIDE: 9 INJECTION, SOLUTION INTRAVENOUS at 21:07

## 2023-09-10 RX ADMIN — OXYCODONE HYDROCHLORIDE AND ACETAMINOPHEN 500 MG: 500 TABLET ORAL at 09:36

## 2023-09-10 RX ADMIN — HYDROCORTISONE 15 MG: 10 TABLET ORAL at 21:07

## 2023-09-10 RX ADMIN — PIPERACILLIN AND TAZOBACTAM 4.5 G: 4; .5 INJECTION, POWDER, FOR SOLUTION INTRAVENOUS at 06:05

## 2023-09-10 RX ADMIN — Medication 15 ML: at 09:28

## 2023-09-10 RX ADMIN — CARBAMAZEPINE 150 MG: 100 SUSPENSION ORAL at 12:41

## 2023-09-10 RX ADMIN — HEPARIN SODIUM 5000 UNITS: 5000 INJECTION, SOLUTION INTRAVENOUS; SUBCUTANEOUS at 17:40

## 2023-09-10 RX ADMIN — SODIUM CHLORIDE: 9 INJECTION, SOLUTION INTRAVENOUS at 03:55

## 2023-09-10 ASSESSMENT — ACTIVITIES OF DAILY LIVING (ADL)
ADLS_ACUITY_SCORE: 59
ADLS_ACUITY_SCORE: 57
ADLS_ACUITY_SCORE: 61
ADLS_ACUITY_SCORE: 57
ADLS_ACUITY_SCORE: 57
ADLS_ACUITY_SCORE: 59
ADLS_ACUITY_SCORE: 57
ADLS_ACUITY_SCORE: 59
ADLS_ACUITY_SCORE: 57
ADLS_ACUITY_SCORE: 59
ADLS_ACUITY_SCORE: 59
ADLS_ACUITY_SCORE: 61

## 2023-09-10 NOTE — SIGNIFICANT EVENT
Significant Event Note    Time of event: 5:14 AM September 10, 2023    Description of event:  Paged for blood cultures positive for gram positive cocci in clusters    Plan:  Vancomycin and zosyn continued as we follow up verigene results    Discussed with: bedside nurse    Tyler Garcia MD

## 2023-09-10 NOTE — PLAN OF CARE
Physical Therapy/Occupational Therapy: Orders received. Chart reviewed and discussed with RN.? Acute Physical Therapy /Occupational Therapy are not indicated at the pt is currently at baseline, he is dependent with functional mobility, ADLs and IADLs.  Notes indicate the pt has received home cares including PT/OT/SLP/RN and PCA services. Patient lives with his mother in a handicap accessible home. Pt would benefit from transferring up to chair (pt's own wc or a recliner chair) with assist from nursing using the ceiling lift and universal sling for general strength benefits and PI prevention. Defer discharge recommendations to the medical team.? Will complete orders.
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
## 2023-09-10 NOTE — PLAN OF CARE
Goal Outcome Evaluation:      Plan of Care Reviewed With: patient    Overall Patient Progress: no changeOverall Patient Progress: no change    Admit 9/9/23 with sepsis/pneumonia    Orientation-Alert to self.    Vitals/Tele-BP running soft, 90's/70's. HR 90's. O2 98% 2LO2 NC. Lungs diminished/coarse. Frequent congested wet cough. Yonker at bedside, unable to suction any secretions.    IV Access/drains-2 PIV's left arm. Lower infusing NaCl 0.9% @ 100 ml/hr. Intermittent IV antibiotics.    Diet-NPO with TF at 55 ml/hr goal rate and 150 ml every 4 hour flushes.    Mobility-Lift, turn and repo, assist of 2.    GI/-Incontinent of B&B, 2 episodes of urine, 1 small formed smear stool.    Wound/Skin-intact with blanchable redness on coccyx-protective mepilex applied. Small rash of middle of chest-dryness.    Consults-PT/OT/SLP    Cr. 1,49, WBC 14.8, lactic 2, K+ 3.2-replaced one time dose in ED.    Discharge Plan-pending progress.        See Flow sheets for assessment

## 2023-09-10 NOTE — PLAN OF CARE
Summary: Sepsis   DATE & TIME: 09/09/23 9215-1661    Cognitive Concerns/ Orientation : Alert to presence in room. Garbled speech, incoherent. Cooperative with cares and assisting with turns.    BEHAVIOR & AGGRESSION TOOL COLOR: Green   CIWA SCORE: NA    ABNL VS/O2: VSS, on 2L O2 via NC   MOBILITY: Total. Turned Q2h   PAIN MANAGMENT: Resting comfortably this evening.   DIET: TF running at 55mL/hr, goal rate. FW flushed 150mL Q4h.   BOWEL/BLADDER: Small BM this shift. Incontinent of bladder.   ABNL LAB/BG: LA 2.4, 2.5, 2.0. Trop 25, K+3.2, Crea 1.49, Mag 2.8, CRP 91.04, Viral panel negative. CT + for possible RLL aspiration PNA   DRAIN/DEVICES: PIV in L hand infusing NS at 100mL/hr. L AC PIV SL.   TELEMETRY RHYTHM: NA   SKIN: Pale in color. Blanchable redness on coccyx with Mepilex for protection. Dry oral cavity.   TESTS/PROCEDURES: AM labs   D/C DAY/GOALS/PLACE: Pending   OTHER IMPORTANT INFO: Bed alarm on for safety. Rounded on freq. Contact isolation maintained. Mother updated in room and on phone This evening.

## 2023-09-10 NOTE — PLAN OF CARE
Summary: Sepsis   DATE & TIME: 09/09/23 8348-5836    Cognitive Concerns/ Orientation : Alert to presence in room. Garbled speech, incoherent. Cooperative with cares and assisting with turns.    BEHAVIOR & AGGRESSION TOOL COLOR: Green   CIWA SCORE: NA    ABNL VS/O2: VSS, on 2L O2 via NC   MOBILITY: Total. Turned Q2h   PAIN MANAGMENT: Resting comfortably this shift.   DIET: TF running at 55mL/hr, goal rate. FW flushed 150mL Q4h.   BOWEL/BLADDER: 1x BM this shift. Incontinent of bladder.   ABNL LAB/BG: LA 2.4, 2.5, 2.0. Trop 25, K+3.2, Crea 1.49, Mag 2.8, CRP 91.04, Viral panel negative. CT + for possible RLL aspiration PNA   DRAIN/DEVICES: PIV in L hand infusing NS at 100mL/hr. L AC PIV SL.   TELEMETRY RHYTHM: NA   SKIN: Pale in color. Blanchable redness on coccyx with Mepilex for protection. Dry oral cavity.   TESTS/PROCEDURES: AM labs, CT done, blood culture positive, plan to continue the abx course.  D/C DAY/GOALS/PLACE: Pending   OTHER IMPORTANT INFO: Bed alarm on for safety. Rounded on freq. Contact isolation maintained.

## 2023-09-10 NOTE — PLAN OF CARE
Goal Outcome Evaluation:  Summary: Sepsis   DATE & TIME: 09/10/23 7227-4857    Cognitive Concerns/ Orientation : Alert to presence in room. Garbled speech, incoherent. Cooperative with cares and assisting with turns.    BEHAVIOR & AGGRESSION TOOL COLOR: Green     ABNL VS/O2: VSS, on 2L O2 via NC   MOBILITY: Total. T&R Q2h   PAIN MANAGMENT: Resting comfortably this shift. No non-verbal signs of pain.  DIET: TF running at 55mL/hr, goal rate. FW flushed 150mL Q4h.   BOWEL/BLADDER: BM smear today, Incontinent of bladder.   ABNL LAB/BG: Hgb 12.7, Plt 123  DRAIN/DEVICES: PIV in L forearm infusing NS at 100mL/hr. Intermittent antibiotics.  TELEMETRY RHYTHM: NA   SKIN: Pale in color. Blanchable redness on coccyx with Mepilex for protection. Dry oral cavity oral cares provided. Rash on midline of chest.  TESTS/PROCEDURES:   D/C DAY/GOALS/PLACE: Pending   OTHER IMPORTANT INFO: Bed alarm on for safety. Rounded on freq. Contact isolation maintained.

## 2023-09-11 LAB
ANION GAP SERPL CALCULATED.3IONS-SCNC: 8 MMOL/L (ref 7–15)
BACTERIA UR CULT: NO GROWTH
BUN SERPL-MCNC: 12.4 MG/DL (ref 8–23)
CALCIUM SERPL-MCNC: 7.7 MG/DL (ref 8.8–10.2)
CHLORIDE SERPL-SCNC: 109 MMOL/L (ref 98–107)
CREAT SERPL-MCNC: 0.91 MG/DL (ref 0.67–1.17)
DEPRECATED HCO3 PLAS-SCNC: 24 MMOL/L (ref 22–29)
EGFRCR SERPLBLD CKD-EPI 2021: >90 ML/MIN/1.73M2
ERYTHROCYTE [DISTWIDTH] IN BLOOD BY AUTOMATED COUNT: 14.6 % (ref 10–15)
GLUCOSE BLDC GLUCOMTR-MCNC: 123 MG/DL (ref 70–99)
GLUCOSE SERPL-MCNC: 129 MG/DL (ref 70–99)
HCT VFR BLD AUTO: 38.5 % (ref 40–53)
HGB BLD-MCNC: 12 G/DL (ref 13.3–17.7)
MCH RBC QN AUTO: 29.2 PG (ref 26.5–33)
MCHC RBC AUTO-ENTMCNC: 31.2 G/DL (ref 31.5–36.5)
MCV RBC AUTO: 94 FL (ref 78–100)
PLATELET # BLD AUTO: 118 10E3/UL (ref 150–450)
POTASSIUM SERPL-SCNC: 3.8 MMOL/L (ref 3.4–5.3)
RBC # BLD AUTO: 4.11 10E6/UL (ref 4.4–5.9)
SODIUM SERPL-SCNC: 141 MMOL/L (ref 136–145)
WBC # BLD AUTO: 5.6 10E3/UL (ref 4–11)

## 2023-09-11 PROCEDURE — 250N000011 HC RX IP 250 OP 636: Mod: JZ | Performed by: HOSPITALIST

## 2023-09-11 PROCEDURE — 258N000003 HC RX IP 258 OP 636: Performed by: HOSPITALIST

## 2023-09-11 PROCEDURE — 250N000013 HC RX MED GY IP 250 OP 250 PS 637: Performed by: HOSPITALIST

## 2023-09-11 PROCEDURE — 99232 SBSQ HOSP IP/OBS MODERATE 35: CPT | Performed by: HOSPITALIST

## 2023-09-11 PROCEDURE — 85027 COMPLETE CBC AUTOMATED: CPT | Performed by: HOSPITALIST

## 2023-09-11 PROCEDURE — 36415 COLL VENOUS BLD VENIPUNCTURE: CPT | Performed by: HOSPITALIST

## 2023-09-11 PROCEDURE — 120N000001 HC R&B MED SURG/OB

## 2023-09-11 PROCEDURE — 80048 BASIC METABOLIC PNL TOTAL CA: CPT | Performed by: HOSPITALIST

## 2023-09-11 RX ADMIN — SODIUM CHLORIDE: 9 INJECTION, SOLUTION INTRAVENOUS at 16:15

## 2023-09-11 RX ADMIN — PIPERACILLIN AND TAZOBACTAM 4.5 G: 4; .5 INJECTION, POWDER, FOR SOLUTION INTRAVENOUS at 18:38

## 2023-09-11 RX ADMIN — OXYCODONE HYDROCHLORIDE AND ACETAMINOPHEN 500 MG: 500 TABLET ORAL at 09:14

## 2023-09-11 RX ADMIN — Medication 15 ML: at 09:15

## 2023-09-11 RX ADMIN — HEPARIN SODIUM 5000 UNITS: 5000 INJECTION, SOLUTION INTRAVENOUS; SUBCUTANEOUS at 05:06

## 2023-09-11 RX ADMIN — BRIVARACETAM 100 MG: 10 SOLUTION ORAL at 21:43

## 2023-09-11 RX ADMIN — PIPERACILLIN AND TAZOBACTAM 4.5 G: 4; .5 INJECTION, POWDER, FOR SOLUTION INTRAVENOUS at 11:02

## 2023-09-11 RX ADMIN — CARBAMAZEPINE 150 MG: 100 SUSPENSION ORAL at 00:42

## 2023-09-11 RX ADMIN — PIPERACILLIN AND TAZOBACTAM 4.5 G: 4; .5 INJECTION, POWDER, FOR SOLUTION INTRAVENOUS at 06:38

## 2023-09-11 RX ADMIN — SODIUM CHLORIDE 500 ML: 9 INJECTION, SOLUTION INTRAVENOUS at 09:12

## 2023-09-11 RX ADMIN — LEVOTHYROXINE SODIUM 150 MCG: 75 TABLET ORAL at 09:14

## 2023-09-11 RX ADMIN — CARBAMAZEPINE 100 MG: 100 SUSPENSION ORAL at 18:41

## 2023-09-11 RX ADMIN — Medication 40 MG: at 06:38

## 2023-09-11 RX ADMIN — SODIUM BICARBONATE 650 MG TABLET 650 MG: at 20:20

## 2023-09-11 RX ADMIN — SODIUM BICARBONATE 650 MG TABLET 1300 MG: at 09:13

## 2023-09-11 RX ADMIN — HYDROCORTISONE 15 MG: 10 TABLET ORAL at 16:40

## 2023-09-11 RX ADMIN — CARBAMAZEPINE 150 MG: 100 SUSPENSION ORAL at 06:38

## 2023-09-11 RX ADMIN — HEPARIN SODIUM 5000 UNITS: 5000 INJECTION, SOLUTION INTRAVENOUS; SUBCUTANEOUS at 16:40

## 2023-09-11 RX ADMIN — AZELASTINE HYDROCHLORIDE 1 DROP: 0.5 SOLUTION/ DROPS INTRAOCULAR at 20:21

## 2023-09-11 RX ADMIN — HYDROCORTISONE 15 MG: 10 TABLET ORAL at 21:42

## 2023-09-11 RX ADMIN — FAMOTIDINE 20 MG: 10 INJECTION INTRAVENOUS at 11:03

## 2023-09-11 RX ADMIN — CARBAMAZEPINE 150 MG: 100 SUSPENSION ORAL at 11:02

## 2023-09-11 RX ADMIN — ACETAMINOPHEN 650 MG: 325 TABLET, FILM COATED ORAL at 04:04

## 2023-09-11 RX ADMIN — HYDROCORTISONE 15 MG: 10 TABLET ORAL at 09:14

## 2023-09-11 RX ADMIN — PIPERACILLIN AND TAZOBACTAM 4.5 G: 4; .5 INJECTION, POWDER, FOR SOLUTION INTRAVENOUS at 00:42

## 2023-09-11 RX ADMIN — AZELASTINE HYDROCHLORIDE 1 DROP: 0.5 SOLUTION/ DROPS INTRAOCULAR at 09:15

## 2023-09-11 RX ADMIN — BRIVARACETAM 100 MG: 10 SOLUTION ORAL at 11:03

## 2023-09-11 ASSESSMENT — ACTIVITIES OF DAILY LIVING (ADL)
ADLS_ACUITY_SCORE: 57
ADLS_ACUITY_SCORE: 61
ADLS_ACUITY_SCORE: 57
ADLS_ACUITY_SCORE: 61
ADLS_ACUITY_SCORE: 57
ADLS_ACUITY_SCORE: 61
ADLS_ACUITY_SCORE: 57
ADLS_ACUITY_SCORE: 57
ADLS_ACUITY_SCORE: 61
ADLS_ACUITY_SCORE: 57

## 2023-09-11 NOTE — PLAN OF CARE
Summary: Sepsis     DATE & TIME: 09/10/23, 8314 - 2057  Cognitive Concerns/ Orientation : Alert to presence in room. Mostly makes incomprehensible sounds. Will follow directions at times and cooperates with cares. Will  assist with turns at times.   BEHAVIOR & AGGRESSION TOOL COLOR: Green     ABNL VS/O2: VSS, on 2L O2 via NC. SaO2 >90%. Patient intermittently removes oxygen. Oxygen still being maintained above 90%.  Snoring when asleep. Dry oral cavity. Occasional strong non-productive cough with repositioning.   MOBILITY: Total. Turned and repositioned   PAIN MANAGMENT: Patient noted to be yelling out, grimacing and a bit restless in bed. Prn Tylenol was given and patient more comfortable and relaxed after administration  DIET: TF running at 55mL/hr, goal rate. Free water flushes of 150 mL Q4h. All meds through G tube.   BOWEL/BLADDER: Incontinent of B/B. External catheter in place.   ABNL LAB/BG: AM Labs pending  DRAIN/DEVICES: PIV infusing  at 100mL/hr. Intermittent antibiotics. G/J tube WDL.   TELEMETRY RHYTHM: NA   SKIN: Pale in color. Blanchable redness on coccyx with Mepilex for protection. Dry oral cavity oral cares provided. Rash to chest  TESTS/PROCEDURES: AM labs   D/C DAY/GOALS/PLACE: Pending improvements   OTHER IMPORTANT INFO: Contact isolation maintained.     Goal Outcome Evaluation:      Plan of Care Reviewed With: patient    Overall Patient Progress: no changeOverall Patient Progress: no change

## 2023-09-11 NOTE — PLAN OF CARE
Summary: Sepsis   DATE & TIME: 09/10/23 Evenings    Cognitive Concerns/ Orientation : Alert to presence in room. Garbled speech, incoherent. Cooperative with cares and assisting with turns at times.   BEHAVIOR & AGGRESSION TOOL COLOR: Green     ABNL VS/O2: VSS, on 2L O2 via NC. SaO2 >90%. Snoring when asleep. Dry oral cavity. Occasional strong non-productive cough with repositioning.   MOBILITY: Total. T&R Q2h   PAIN MANAGMENT: Resting comfortably this shift. No non-verbal signs of pain.  DIET: TF running at 55mL/hr, goal rate. FW flushed 150mL Q4h. All meds through G tube.   BOWEL/BLADDER: Large soft BM this shift, Incontinent of bladder. External catheter in place.   ABNL LAB/BG: Hgb 12.7, Plt 123  DRAIN/DEVICES: PIV in L forearm infusing NS at 100mL/hr. Intermittent antibiotics. G/J tube WDL.   TELEMETRY RHYTHM: NA   SKIN: Pale in color. Blanchable redness on coccyx with Mepilex for protection. Dry oral cavity oral cares provided. Rash on midline of chest.  TESTS/PROCEDURES: AM labs   D/C DAY/GOALS/PLACE: Pending improvements   OTHER IMPORTANT INFO: Bed alarm on for safety. Rounded on freq. Contact isolation maintained.
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
## 2023-09-11 NOTE — ED TRIAGE NOTES
1st call message left to schedule Colonoscopy   BIBA from home where he lives with mother and receives homecare services. Mother reports fever x 4 days.

## 2023-09-11 NOTE — PLAN OF CARE
Summary: Sepsis     DATE & TIME: 09/11/23 2501-6530  Cognitive Concerns/ Orientation : Difficult to assess orientation due to cognitive status. Mostly makes incomprehensible sounds. Will follow directions at times and cooperates with cares.   BEHAVIOR & AGGRESSION TOOL COLOR: Green     ABNL VS/O2: VSS, except hypotension 500 mL bolus given with improvement. Currently on room air  On continuous pulse ox sats around mid 90s. Oral care provided for secretions.   MOBILITY: Up with 2 and lift, q2hr turn and repo.   PAIN MANAGMENT: Patient appears comfortable, no nonverbal cues of pain noted.  DIET: TF running at 55mL/hr, goal rate. Free water flushes of 150 mL Q4h. All meds through G tube.   BOWEL/BLADDER: Incontinent of B/B. Had two significant bowel movements. External catheter in place.   ABNL LAB/BG: BG of 129.   DRAIN/DEVICES: PIV infusing  at 100mL/hr. Intermittent antibiotics. G/J tube WDL.   SKIN: Pale in color. Blanchable redness on coccyx with Mepilex for protection. Dry oral cavity oral cares provided. Rash to chest.  TESTS/PROCEDURES: None.  D/C DAY/GOALS/PLACE: Pending improvements. Home with mom when stable.  OTHER IMPORTANT INFO: Contact isolation maintained. Notify mother 24 hours before discharge. Continue IVs Zosyn. Possible discharge tomorrow.

## 2023-09-12 LAB
ANION GAP SERPL CALCULATED.3IONS-SCNC: 9 MMOL/L (ref 7–15)
BASOPHILS # BLD AUTO: 0.1 10E3/UL (ref 0–0.2)
BASOPHILS NFR BLD AUTO: 1 %
BUN SERPL-MCNC: 11 MG/DL (ref 8–23)
CALCIUM SERPL-MCNC: 7.6 MG/DL (ref 8.8–10.2)
CHLORIDE SERPL-SCNC: 107 MMOL/L (ref 98–107)
CREAT SERPL-MCNC: 0.91 MG/DL (ref 0.67–1.17)
DEPRECATED HCO3 PLAS-SCNC: 20 MMOL/L (ref 22–29)
EGFRCR SERPLBLD CKD-EPI 2021: >90 ML/MIN/1.73M2
EOSINOPHIL # BLD AUTO: 0.3 10E3/UL (ref 0–0.7)
EOSINOPHIL NFR BLD AUTO: 5 %
ERYTHROCYTE [DISTWIDTH] IN BLOOD BY AUTOMATED COUNT: 14.7 % (ref 10–15)
GLUCOSE BLDC GLUCOMTR-MCNC: 116 MG/DL (ref 70–99)
GLUCOSE SERPL-MCNC: 143 MG/DL (ref 70–99)
HCT VFR BLD AUTO: 39.6 % (ref 40–53)
HGB BLD-MCNC: 12.2 G/DL (ref 13.3–17.7)
IMM GRANULOCYTES # BLD: 0 10E3/UL
IMM GRANULOCYTES NFR BLD: 1 %
LYMPHOCYTES # BLD AUTO: 1.5 10E3/UL (ref 0.8–5.3)
LYMPHOCYTES NFR BLD AUTO: 22 %
MCH RBC QN AUTO: 29.1 PG (ref 26.5–33)
MCHC RBC AUTO-ENTMCNC: 30.8 G/DL (ref 31.5–36.5)
MCV RBC AUTO: 95 FL (ref 78–100)
MONOCYTES # BLD AUTO: 0.6 10E3/UL (ref 0–1.3)
MONOCYTES NFR BLD AUTO: 9 %
NEUTROPHILS # BLD AUTO: 4.1 10E3/UL (ref 1.6–8.3)
NEUTROPHILS NFR BLD AUTO: 62 %
NRBC # BLD AUTO: 0 10E3/UL
NRBC BLD AUTO-RTO: 0 /100
PLATELET # BLD AUTO: 127 10E3/UL (ref 150–450)
POTASSIUM SERPL-SCNC: 4.4 MMOL/L (ref 3.4–5.3)
RBC # BLD AUTO: 4.19 10E6/UL (ref 4.4–5.9)
SODIUM SERPL-SCNC: 136 MMOL/L (ref 136–145)
WBC # BLD AUTO: 6.5 10E3/UL (ref 4–11)

## 2023-09-12 PROCEDURE — 250N000013 HC RX MED GY IP 250 OP 250 PS 637: Performed by: HOSPITALIST

## 2023-09-12 PROCEDURE — 250N000013 HC RX MED GY IP 250 OP 250 PS 637: Performed by: INTERNAL MEDICINE

## 2023-09-12 PROCEDURE — 85025 COMPLETE CBC W/AUTO DIFF WBC: CPT | Performed by: HOSPITALIST

## 2023-09-12 PROCEDURE — 250N000011 HC RX IP 250 OP 636: Performed by: HOSPITALIST

## 2023-09-12 PROCEDURE — 258N000003 HC RX IP 258 OP 636: Performed by: HOSPITALIST

## 2023-09-12 PROCEDURE — 36415 COLL VENOUS BLD VENIPUNCTURE: CPT | Performed by: HOSPITALIST

## 2023-09-12 PROCEDURE — 120N000001 HC R&B MED SURG/OB

## 2023-09-12 PROCEDURE — 80048 BASIC METABOLIC PNL TOTAL CA: CPT | Performed by: HOSPITALIST

## 2023-09-12 PROCEDURE — 99232 SBSQ HOSP IP/OBS MODERATE 35: CPT | Performed by: INTERNAL MEDICINE

## 2023-09-12 RX ORDER — AMOXICILLIN AND CLAVULANATE POTASSIUM 600; 42.9 MG/5ML; MG/5ML
600 POWDER, FOR SUSPENSION ORAL 2 TIMES DAILY
Qty: 70 ML | Refills: 0 | Status: SHIPPED | OUTPATIENT
Start: 2023-09-12 | End: 2023-09-12

## 2023-09-12 RX ORDER — AMOXICILLIN AND CLAVULANATE POTASSIUM 400; 57 MG/5ML; MG/5ML
875 POWDER, FOR SUSPENSION ORAL 2 TIMES DAILY
Qty: 153.16 ML | Refills: 0 | Status: SHIPPED | OUTPATIENT
Start: 2023-09-12 | End: 2023-09-20

## 2023-09-12 RX ORDER — AMOXICILLIN AND CLAVULANATE POTASSIUM 400; 57 MG/5ML; MG/5ML
875 POWDER, FOR SUSPENSION ORAL 2 TIMES DAILY
Status: DISCONTINUED | OUTPATIENT
Start: 2023-09-12 | End: 2023-09-13 | Stop reason: HOSPADM

## 2023-09-12 RX ADMIN — CARBAMAZEPINE 150 MG: 100 SUSPENSION ORAL at 11:09

## 2023-09-12 RX ADMIN — HYDROCORTISONE 15 MG: 10 TABLET ORAL at 17:39

## 2023-09-12 RX ADMIN — AZELASTINE HYDROCHLORIDE 1 DROP: 0.5 SOLUTION/ DROPS INTRAOCULAR at 09:01

## 2023-09-12 RX ADMIN — OXYCODONE HYDROCHLORIDE AND ACETAMINOPHEN 500 MG: 500 TABLET ORAL at 08:55

## 2023-09-12 RX ADMIN — HYDROCORTISONE 15 MG: 10 TABLET ORAL at 08:55

## 2023-09-12 RX ADMIN — AMOXICILLIN AND CLAVULANATE POTASSIUM 875 MG: 400; 57 POWDER, FOR SUSPENSION ORAL at 21:10

## 2023-09-12 RX ADMIN — PIPERACILLIN AND TAZOBACTAM 4.5 G: 4; .5 INJECTION, POWDER, FOR SOLUTION INTRAVENOUS at 00:24

## 2023-09-12 RX ADMIN — PIPERACILLIN AND TAZOBACTAM 4.5 G: 4; .5 INJECTION, POWDER, FOR SOLUTION INTRAVENOUS at 06:01

## 2023-09-12 RX ADMIN — CARBAMAZEPINE 100 MG: 100 SUSPENSION ORAL at 18:21

## 2023-09-12 RX ADMIN — SODIUM CHLORIDE: 9 INJECTION, SOLUTION INTRAVENOUS at 03:18

## 2023-09-12 RX ADMIN — SODIUM BICARBONATE 650 MG TABLET 650 MG: at 21:09

## 2023-09-12 RX ADMIN — BRIVARACETAM 100 MG: 10 SOLUTION ORAL at 11:09

## 2023-09-12 RX ADMIN — CARBAMAZEPINE 150 MG: 100 SUSPENSION ORAL at 00:18

## 2023-09-12 RX ADMIN — Medication 40 MG: at 06:56

## 2023-09-12 RX ADMIN — BRIVARACETAM 100 MG: 10 SOLUTION ORAL at 23:20

## 2023-09-12 RX ADMIN — PIPERACILLIN AND TAZOBACTAM 4.5 G: 4; .5 INJECTION, POWDER, FOR SOLUTION INTRAVENOUS at 11:09

## 2023-09-12 RX ADMIN — HEPARIN SODIUM 5000 UNITS: 5000 INJECTION, SOLUTION INTRAVENOUS; SUBCUTANEOUS at 06:01

## 2023-09-12 RX ADMIN — Medication 15 ML: at 09:01

## 2023-09-12 RX ADMIN — CARBAMAZEPINE 150 MG: 100 SUSPENSION ORAL at 06:03

## 2023-09-12 RX ADMIN — SODIUM BICARBONATE 650 MG TABLET 1300 MG: at 08:55

## 2023-09-12 RX ADMIN — HEPARIN SODIUM 5000 UNITS: 5000 INJECTION, SOLUTION INTRAVENOUS; SUBCUTANEOUS at 17:39

## 2023-09-12 RX ADMIN — FAMOTIDINE 20 MG: 10 INJECTION INTRAVENOUS at 09:01

## 2023-09-12 RX ADMIN — LEVOTHYROXINE SODIUM 150 MCG: 75 TABLET ORAL at 08:55

## 2023-09-12 RX ADMIN — HYDROCORTISONE 15 MG: 10 TABLET ORAL at 21:09

## 2023-09-12 ASSESSMENT — ACTIVITIES OF DAILY LIVING (ADL)
ADLS_ACUITY_SCORE: 55
ADLS_ACUITY_SCORE: 61
ADLS_ACUITY_SCORE: 55
ADLS_ACUITY_SCORE: 61
ADLS_ACUITY_SCORE: 61
ADLS_ACUITY_SCORE: 51
ADLS_ACUITY_SCORE: 55
ADLS_ACUITY_SCORE: 55
ADLS_ACUITY_SCORE: 61
DEPENDENT_IADLS:: CLEANING;COOKING;LAUNDRY;SHOPPING;MEAL PREPARATION;MEDICATION MANAGEMENT;MONEY MANAGEMENT;TRANSPORTATION;INCONTINENCE
ADLS_ACUITY_SCORE: 61
ADLS_ACUITY_SCORE: 51
ADLS_ACUITY_SCORE: 51

## 2023-09-12 NOTE — CONSULTS
Care Management Initial Consult    General Information  Assessment completed with: Parents, Mother, Savannah  Type of CM/SW Visit: Initial Assessment    Primary Care Provider verified and updated as needed: No   Readmission within the last 30 days: no previous admission in last 30 days      Reason for Consult: discharge planning  Advance Care Planning:            Communication Assessment  Patient's communication style: spoken language (English or Bilingual)             Cognitive  Cognitive/Neuro/Behavioral: .WDL except, orientation, speech  Level of Consciousness: alert  Arousal Level: opens eyes spontaneously  Orientation:  (difficult to assess)  Mood/Behavior: calm, cooperative  Best Language: 1 - Mild to moderate  Speech: unable to speak    Living Environment:   People in home: parent(s), other (see comments) (24 hour PCA)  Savannah - mothers  Current living Arrangements: house      Able to return to prior arrangements: yes  Living Arrangement Comments: Mother cares for [place] along with 24/7 PCA through [facility]; Has tube feeding and supplies through [facility]    Family/Social Support:  Care provided by: homecare agency, parent(s)  Provides care for: no one  Marital Status: Single             Description of Support System:           Current Resources:   Patient receiving home care services: Yes  Skilled Home Care Services: [facility] Health [facility]  Community Resources: County Worker, PCA, Other (see comment) (tube feeding supplies with [facility])  Equipment currently used at home:    Supplies currently used at home:      Employment/Financial:  Employment Status: disabled        Financial Concerns: No concerns identified           Does the patient's insurance plan have a 3 day qualifying hospital stay waiver?  No    Lifestyle & Psychosocial Needs:  Social Determinants of Health     Tobacco Use: Medium Risk (8/11/2023)    Patient History     Smoking Tobacco Use: Former     Smokeless Tobacco Use: Never      Passive Exposure: Not on file   Alcohol Use: Not on file   Financial Resource Strain: Not on file   Food Insecurity: Not on file   Transportation Needs: Not on file   Physical Activity: Not on file   Stress: Not on file   Social Connections: Not on file   Intimate Partner Violence: Not on file   Depression: Not at risk (6/13/2022)    PHQ-2     PHQ-2 Score: 0   Housing Stability: Not on file       Functional Status:  Prior to admission patient needed assistance:   Dependent ADLs:: Bathing, Eating, Dressing, Grooming, Incontinence, Positioning, Transfers, Wheelchair-with assist, Toileting  Dependent IADLs:: Cleaning, Cooking, Laundry, Shopping, Meal Preparation, Medication Management, Money Management, Transportation, Incontinence  Assesssment of Functional Status: At functional baseline    Mental Health Status:          Chemical Dependency Status:                Values/Beliefs:  Spiritual, Cultural Beliefs, Gnosticism Practices, Values that affect care: no               Additional Information:  Spoke with Mom Savannah over the phone.  Patient is non verbal.    Mother is accepting of return of patient to home anytime.  Discharge is expected tomorrow and she requested transportation be arranged for around 11:30-noon.    She takes care of patient with the help of a PCA who is there 24 hours a day.  She states she has been getting his tube feeding supplies with the help of their PCP through [facility] and states she has feeding and supplies available.    She has contact with a  with a cadi waiver and they have been attempting to resume a RN visit but have not been able to find one.  She will continue to pursue this with the .  Stretcher transportation has been arranged through [employer] for  window of [phone] on Wednesday 9/13.      Pilar Benavides RN  Inpatient Float Care Coordinator

## 2023-09-12 NOTE — PLAN OF CARE
Summary: Sepsis; Aspiration Pneumonia    DATE & TIME: 09/12/23 5912-6106  Cognitive Concerns/ Orientation : Difficult to assess orientation due to cognitive status. Mostly makes incomprehensible sounds. Will follow directions at times and cooperates with cares.   BEHAVIOR & AGGRESSION TOOL COLOR: Green     ABNL VS/O2: VSS on RA, On continuous pulse ox sats around mid 90s.   MOBILITY: Up with 2 and lift, q2hr turn and repo. Up in chair today.   PAIN MANAGMENT: Patient appears comfortable, no nonverbal cues of pain noted.  DIET: TF running at 55mL/hr, goal rate. Free water flushes of 150 mL Q4h. All meds through G tube.   BOWEL/BLADDER: Incontinent of B/B. External catheter in place.   ABNL LAB/BG:    DRAIN/DEVICES: Fluids discontinued. Intermittent antibiotics. IV antibiotics d/c and switched to G/J tube antibiotics. G/J tube WDL.   SKIN: Pale in color. Blanchable redness on coccyx with Mepilex for protection. Dry oral cavity oral cares provided. Rash to chest.  TESTS/PROCEDURES: None.  D/C DAY/GOALS/PLACE:   Discharge tomorrow Wednesday, 9/13/23 at [id number] via stretcher. Follow up with PCP in one week.  OTHER IMPORTANT INFO: Contact isolation maintained.

## 2023-09-12 NOTE — PLAN OF CARE
Goal Outcome Evaluation:  Summary: Sepsis     DATE & TIME: 09/11/23-09/12/23 4155-1136  Cognitive Concerns/ Orientation : Difficult to assess orientation due to cognitive status. Mostly makes incomprehensible sounds. Will follow directions at times and cooperates with cares.   BEHAVIOR & AGGRESSION TOOL COLOR: Green     ABNL VS/O2: VSS on RA, On continuous pulse ox sats around mid 90s. MOBILITY: Up with 2 and lift, q2hr turn and repo.   PAIN MANAGMENT: Patient appears comfortable, no nonverbal cues of pain noted.  DIET: TF running at 55mL/hr, goal rate. Free water flushes of 150 mL Q4h. All meds through G tube.   BOWEL/BLADDER: Incontinent of B/B. Had small BM . External catheter in place.   ABNL LAB/BG:    DRAIN/DEVICES: PIV NS infusing  at 100mL/hr. Intermittent antibiotics. G/J tube WDL.   SKIN: Pale in color. Blanchable redness on coccyx with Mepilex for protection. Dry oral cavity oral cares provided. Rash to chest.  TESTS/PROCEDURES: None.  D/C DAY/GOALS/PLACE: Pending improvements. Home with mom when stable.  OTHER IMPORTANT INFO: Contact isolation maintained. Notify mother 24 hours before discharge. Continue IVs Zosyn. Possible discharge today.
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
## 2023-09-13 VITALS
WEIGHT: 169.31 LBS | DIASTOLIC BLOOD PRESSURE: 56 MMHG | HEART RATE: 63 BPM | TEMPERATURE: 97.7 F | SYSTOLIC BLOOD PRESSURE: 115 MMHG | RESPIRATION RATE: 20 BRPM | BODY MASS INDEX: 22.93 KG/M2 | OXYGEN SATURATION: 97 % | HEIGHT: 72 IN

## 2023-09-13 LAB
ANION GAP SERPL CALCULATED.3IONS-SCNC: 9 MMOL/L (ref 7–15)
BUN SERPL-MCNC: 10.4 MG/DL (ref 8–23)
CALCIUM SERPL-MCNC: 7.7 MG/DL (ref 8.8–10.2)
CHLORIDE SERPL-SCNC: 104 MMOL/L (ref 98–107)
CREAT SERPL-MCNC: 0.82 MG/DL (ref 0.67–1.17)
DEPRECATED HCO3 PLAS-SCNC: 23 MMOL/L (ref 22–29)
EGFRCR SERPLBLD CKD-EPI 2021: >90 ML/MIN/1.73M2
ERYTHROCYTE [DISTWIDTH] IN BLOOD BY AUTOMATED COUNT: 14.6 % (ref 10–15)
GLUCOSE SERPL-MCNC: 128 MG/DL (ref 70–99)
HCT VFR BLD AUTO: 37.9 % (ref 40–53)
HGB BLD-MCNC: 12.2 G/DL (ref 13.3–17.7)
MCH RBC QN AUTO: 29.1 PG (ref 26.5–33)
MCHC RBC AUTO-ENTMCNC: 32.2 G/DL (ref 31.5–36.5)
MCV RBC AUTO: 91 FL (ref 78–100)
PLATELET # BLD AUTO: 134 10E3/UL (ref 150–450)
POTASSIUM SERPL-SCNC: 3.7 MMOL/L (ref 3.4–5.3)
RBC # BLD AUTO: 4.19 10E6/UL (ref 4.4–5.9)
SODIUM SERPL-SCNC: 136 MMOL/L (ref 136–145)
WBC # BLD AUTO: 5.8 10E3/UL (ref 4–11)

## 2023-09-13 PROCEDURE — 80048 BASIC METABOLIC PNL TOTAL CA: CPT | Performed by: HOSPITALIST

## 2023-09-13 PROCEDURE — 36415 COLL VENOUS BLD VENIPUNCTURE: CPT | Performed by: HOSPITALIST

## 2023-09-13 PROCEDURE — 250N000011 HC RX IP 250 OP 636: Mod: JZ | Performed by: HOSPITALIST

## 2023-09-13 PROCEDURE — 250N000013 HC RX MED GY IP 250 OP 250 PS 637: Performed by: INTERNAL MEDICINE

## 2023-09-13 PROCEDURE — 99239 HOSP IP/OBS DSCHRG MGMT >30: CPT | Performed by: INTERNAL MEDICINE

## 2023-09-13 PROCEDURE — 85027 COMPLETE CBC AUTOMATED: CPT | Performed by: HOSPITALIST

## 2023-09-13 PROCEDURE — 250N000013 HC RX MED GY IP 250 OP 250 PS 637: Performed by: HOSPITALIST

## 2023-09-13 RX ORDER — FLUCONAZOLE 150 MG/1
150 TABLET ORAL ONCE
Status: DISCONTINUED | OUTPATIENT
Start: 2023-09-13 | End: 2023-09-13 | Stop reason: HOSPADM

## 2023-09-13 RX ADMIN — BRIVARACETAM 100 MG: 10 SOLUTION ORAL at 10:51

## 2023-09-13 RX ADMIN — Medication 40 MG: at 06:52

## 2023-09-13 RX ADMIN — CARBAMAZEPINE 150 MG: 100 SUSPENSION ORAL at 01:52

## 2023-09-13 RX ADMIN — LEVOTHYROXINE SODIUM 150 MCG: 75 TABLET ORAL at 10:52

## 2023-09-13 RX ADMIN — FAMOTIDINE 20 MG: 10 INJECTION INTRAVENOUS at 10:51

## 2023-09-13 RX ADMIN — AMOXICILLIN AND CLAVULANATE POTASSIUM 875 MG: 400; 57 POWDER, FOR SUSPENSION ORAL at 10:53

## 2023-09-13 RX ADMIN — OXYCODONE HYDROCHLORIDE AND ACETAMINOPHEN 500 MG: 500 TABLET ORAL at 10:53

## 2023-09-13 RX ADMIN — SODIUM BICARBONATE 650 MG TABLET 1300 MG: at 10:52

## 2023-09-13 RX ADMIN — HEPARIN SODIUM 5000 UNITS: 5000 INJECTION, SOLUTION INTRAVENOUS; SUBCUTANEOUS at 05:06

## 2023-09-13 RX ADMIN — CARBAMAZEPINE 150 MG: 100 SUSPENSION ORAL at 05:06

## 2023-09-13 RX ADMIN — Medication 15 ML: at 10:53

## 2023-09-13 RX ADMIN — HYDROCORTISONE 15 MG: 10 TABLET ORAL at 10:52

## 2023-09-13 ASSESSMENT — ACTIVITIES OF DAILY LIVING (ADL)
ADLS_ACUITY_SCORE: 61
ADLS_ACUITY_SCORE: 61
ADLS_ACUITY_SCORE: 57
ADLS_ACUITY_SCORE: 61
ADLS_ACUITY_SCORE: 57
ADLS_ACUITY_SCORE: 57

## 2023-09-13 NOTE — PLAN OF CARE
Summary: Sepsis     DATE & TIME: 09/12/23 NOC  Cognitive Concerns/ Orientation : Difficult to assess orientation due to cognitive status. Mostly makes incomprehensible sounds. Will follow directions at times and cooperates with cares.   BEHAVIOR & AGGRESSION TOOL COLOR: Green     ABNL VS/O2: VSS on RA, On continuous pulse ox sats around mid 98s. MOBILITY: Up with 2 and lift, q2hr turn and repo.   PAIN MANAGMENT: Patient appears comfortable, no nonverbal cues of pain noted.  DIET: TF running at 55mL/hr, goal rate. Free water flushes of 150 mL Q4h. All meds through G tube.   BOWEL/BLADDER: Incontinent of B/B. Had 1x BM this shift. External catheter in place.   ABNL LAB/BG:    DRAIN/DEVICES: PIV SL, IV antibiotics discontinued and  G/J tube WDL.   SKIN: Pale in color. Blanchable redness on coccyx with Mepilex for protection. Dry oral cavity oral cares provided. Rash to chest.  TESTS/PROCEDURES: None.  D/C DAY/GOALS/PLACE: Pending improvements. Home with mom when stable.  OTHER IMPORTANT INFO: Contact isolation maintained. Notify mother 24 hours before discharge. Continue IVs Zosyn. Possible discharge today, between [id number]
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
## 2023-09-13 NOTE — PLAN OF CARE
Goal Outcome Evaluation:             Discharge    Patient discharged to home via [facility]  AVS reviewed with mother    Listed belongings gathered and given to patient (including from security/pharmacy). Yes  Care Plan and Patient education resolved: Yes  Prescriptions if needed, hard copies sent with patient  Yes  Medication Bin checked and emptied on discharge Yes  SW/care coordinator/charge RN aware of discharge: Yes

## 2023-09-13 NOTE — PROGRESS NOTES
Care Management Discharge Note    Discharge Date: 09/13/2023       Discharge Disposition: Home    Discharge Services: County Worker, House[employer], PCA    Discharge DME:      Discharge Transportation: agency    Private pay costs discussed: Not applicable    Does the patient's insurance plan have a 3 day qualifying hospital stay waiver?  No    PAS Confirmation Code:    Patient/family educated on Medicare website which has current facility and service quality ratings:      Education Provided on the Discharge Plan:    Persons Notified of Discharge Plans: Deangelo Savannah, bedside nurse  Patient/Family in Agreement with the Plan: yes    Handoff Referral Completed: Yes    Additional Information:  Patient has discharge orders.  Called Deangelo Nuñez to update.  She requested home care PT/RN and MD was updated and referral was added by Dr. Champion.  Explained to Savannah that home care may not be assigned before discharge.  She does ROM  for [place] but she feels she could  benefit from PT for further support and teaching.  RN for post hospital follow up.  Referral sent to the [facility] hub.  Antibiotic is being filled here and will be sent with patient belongings.  She also asked for follow up hospital appointment be made virtual.  This appointment was made with his PCP for 9/20 4:45 pm and is on the AVS.  Transportation is set by stretcher via [employer] for 11:30-12:30 today.    Pilar Benavides RN  Inpatient Float Care Coordinator

## 2023-09-14 ENCOUNTER — APPOINTMENT (OUTPATIENT)
Dept: ULTRASOUND IMAGING | Facility: CLINIC | Age: 63
End: 2023-09-14
Attending: PHYSICIAN ASSISTANT
Payer: MEDICARE

## 2023-09-14 ENCOUNTER — HOSPITAL ENCOUNTER (EMERGENCY)
Facility: CLINIC | Age: 63
Discharge: HOME | End: 2023-09-15
Attending: PHYSICIAN ASSISTANT | Admitting: PHYSICIAN ASSISTANT
Payer: MEDICARE

## 2023-09-14 ENCOUNTER — NURSE TRIAGE (OUTPATIENT)
Dept: FAMILY MEDICINE | Facility: CLINIC | Age: 63
End: 2023-09-14

## 2023-09-14 ENCOUNTER — APPOINTMENT (OUTPATIENT)
Dept: GENERAL RADIOLOGY | Facility: CLINIC | Age: 63
End: 2023-09-14
Attending: PHYSICIAN ASSISTANT
Payer: MEDICARE

## 2023-09-14 ENCOUNTER — PATIENT OUTREACH (OUTPATIENT)
Dept: CARE COORDINATION | Facility: CLINIC | Age: 63
End: 2023-09-14
Payer: MEDICARE

## 2023-09-14 DIAGNOSIS — E03.9 HYPOTHYROIDISM, UNSPECIFIED TYPE: ICD-10-CM

## 2023-09-14 DIAGNOSIS — E87.70 FLUID OVERLOAD: ICD-10-CM

## 2023-09-14 LAB
ALBUMIN SERPL BCG-MCNC: 3.1 G/DL (ref 3.5–5.2)
ALP SERPL-CCNC: 117 U/L (ref 40–129)
ALT SERPL W P-5'-P-CCNC: 49 U/L (ref 0–70)
ANION GAP SERPL CALCULATED.3IONS-SCNC: 10 MMOL/L (ref 7–15)
AST SERPL W P-5'-P-CCNC: 47 U/L (ref 0–45)
BACTERIA BLD CULT: ABNORMAL
BACTERIA BLD CULT: NO GROWTH
BASOPHILS # BLD AUTO: 0.1 10E3/UL (ref 0–0.2)
BASOPHILS NFR BLD AUTO: 1 %
BILIRUB SERPL-MCNC: 0.3 MG/DL
BUN SERPL-MCNC: 12.4 MG/DL (ref 8–23)
CALCIUM SERPL-MCNC: 8.1 MG/DL (ref 8.8–10.2)
CHLORIDE SERPL-SCNC: 101 MMOL/L (ref 98–107)
CREAT SERPL-MCNC: 0.81 MG/DL (ref 0.67–1.17)
DEPRECATED HCO3 PLAS-SCNC: 22 MMOL/L (ref 22–29)
EGFRCR SERPLBLD CKD-EPI 2021: >90 ML/MIN/1.73M2
EOSINOPHIL # BLD AUTO: 0.4 10E3/UL (ref 0–0.7)
EOSINOPHIL NFR BLD AUTO: 5 %
ERYTHROCYTE [DISTWIDTH] IN BLOOD BY AUTOMATED COUNT: 14.5 % (ref 10–15)
GLUCOSE SERPL-MCNC: 84 MG/DL (ref 70–99)
HCT VFR BLD AUTO: 43.6 % (ref 40–53)
HGB BLD-MCNC: 14.1 G/DL (ref 13.3–17.7)
IMM GRANULOCYTES # BLD: 0 10E3/UL
IMM GRANULOCYTES NFR BLD: 1 %
LYMPHOCYTES # BLD AUTO: 2.8 10E3/UL (ref 0.8–5.3)
LYMPHOCYTES NFR BLD AUTO: 34 %
MCH RBC QN AUTO: 28.8 PG (ref 26.5–33)
MCHC RBC AUTO-ENTMCNC: 32.3 G/DL (ref 31.5–36.5)
MCV RBC AUTO: 89 FL (ref 78–100)
MONOCYTES # BLD AUTO: 0.8 10E3/UL (ref 0–1.3)
MONOCYTES NFR BLD AUTO: 10 %
NEUTROPHILS # BLD AUTO: 4.2 10E3/UL (ref 1.6–8.3)
NEUTROPHILS NFR BLD AUTO: 49 %
NRBC # BLD AUTO: 0 10E3/UL
NRBC BLD AUTO-RTO: 0 /100
NT-PROBNP SERPL-MCNC: 3143 PG/ML (ref 0–900)
PLATELET # BLD AUTO: 187 10E3/UL (ref 150–450)
POTASSIUM SERPL-SCNC: 4.2 MMOL/L (ref 3.4–5.3)
PROT SERPL-MCNC: 6.6 G/DL (ref 6.4–8.3)
RBC # BLD AUTO: 4.89 10E6/UL (ref 4.4–5.9)
SODIUM SERPL-SCNC: 133 MMOL/L (ref 136–145)
T4 FREE SERPL-MCNC: 0.85 NG/DL (ref 0.9–1.7)
TSH SERPL DL<=0.005 MIU/L-ACNC: <0.01 UIU/ML (ref 0.3–4.2)
WBC # BLD AUTO: 8.3 10E3/UL (ref 4–11)

## 2023-09-14 PROCEDURE — 84443 ASSAY THYROID STIM HORMONE: CPT | Performed by: PHYSICIAN ASSISTANT

## 2023-09-14 PROCEDURE — 80053 COMPREHEN METABOLIC PANEL: CPT | Performed by: PHYSICIAN ASSISTANT

## 2023-09-14 PROCEDURE — 85014 HEMATOCRIT: CPT | Performed by: PHYSICIAN ASSISTANT

## 2023-09-14 PROCEDURE — 84439 ASSAY OF FREE THYROXINE: CPT | Performed by: PHYSICIAN ASSISTANT

## 2023-09-14 PROCEDURE — 36415 COLL VENOUS BLD VENIPUNCTURE: CPT | Performed by: PHYSICIAN ASSISTANT

## 2023-09-14 PROCEDURE — 93970 EXTREMITY STUDY: CPT

## 2023-09-14 PROCEDURE — 83880 ASSAY OF NATRIURETIC PEPTIDE: CPT | Mod: GZ | Performed by: PHYSICIAN ASSISTANT

## 2023-09-14 PROCEDURE — 71046 X-RAY EXAM CHEST 2 VIEWS: CPT

## 2023-09-14 PROCEDURE — 250N000013 HC RX MED GY IP 250 OP 250 PS 637: Performed by: PHYSICIAN ASSISTANT

## 2023-09-14 PROCEDURE — 99284 EMERGENCY DEPT VISIT MOD MDM: CPT | Mod: 25

## 2023-09-14 PROCEDURE — 93971 EXTREMITY STUDY: CPT | Mod: LT

## 2023-09-14 RX ORDER — FUROSEMIDE 10 MG/ML
40 INJECTION INTRAMUSCULAR; INTRAVENOUS ONCE
Status: DISCONTINUED | OUTPATIENT
Start: 2023-09-14 | End: 2023-09-14

## 2023-09-14 RX ORDER — FUROSEMIDE 20 MG
20 TABLET ORAL DAILY
Qty: 2 TABLET | Refills: 0 | Status: SHIPPED | OUTPATIENT
Start: 2023-09-15 | End: 2023-09-20

## 2023-09-14 RX ORDER — FUROSEMIDE 40 MG
40 TABLET ORAL ONCE
Status: COMPLETED | OUTPATIENT
Start: 2023-09-14 | End: 2023-09-14

## 2023-09-14 RX ADMIN — BRIVARACETAM 100 MG: 10 SOLUTION ORAL at 21:30

## 2023-09-14 RX ADMIN — FUROSEMIDE 40 MG: 40 TABLET ORAL at 21:57

## 2023-09-14 ASSESSMENT — ACTIVITIES OF DAILY LIVING (ADL)
ADLS_ACUITY_SCORE: 37
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
## 2023-09-14 NOTE — ED PROVIDER NOTES
History     Chief Complaint:  Foot Swelling and hand swelling       HPI   Keyon Farias is a 63 year old male with very complex past medical history including quadriplegia, sepsis, seizures, DVT, TBI, hypothyroidism among others who presents for evaluation of edema.  The patient was just discharged from this facility yesterday after being admitted for aspiration pneumonia and possible bacteremia though cultures felt to be contaminants.  Also had ALMAS.    He returns today because of some increased puffiness that his mother noticed in his legs and left arm.  No other symptoms of concern and denies any pain.  No reported fever.  No injuries.  No increasing difficulty breathing.  Still making urine.  Not on blood thinners.    Independent Historian:    The patient's history was limited due to his nonverbal status.  History provided by patient's mother via phone and EMS.    Review of External Notes:  Reviewed Dr. Champion hospitalist discharge summary from 9/13/2023 where patient was discharged after being treated for aspiration pneumonia sepsis and ALMAS.  He was given aggressive fluid hydration for this etiology.  Not on blood thinners.    Medications:    Albuterol  Robinul   Cortef   Synthroid   Cholecalciferol   Reglan      Past Medical History:    DVT   Asthma   Hyperlipidemia   Pneumonia   Seizures   GERD  Thyroid disease  Panhypopituitarism  Ventricular tachyarrhythmia  Leukocytosis   Cardiac arrest   TBI   Pancreatitis  Vitamin D deficiency     Past Surgical History:    EGD x 4  Vascular surgery   Tracheostomy x2   Laparoscopic appendectomy   IR gastro jejunostomy tube change x21    Physical Exam   Patient Vitals for the past 24 hrs:   BP Temp Temp src Pulse Resp SpO2   09/14/23 2203 -- -- -- -- -- 96 %   09/14/23 1631 -- 97.8  F (36.6  C) Oral -- 18 --   09/14/23 1627 119/84 -- -- 64 -- 98 %      Physical Exam  General: Awake, non-toxic. Nonverbal.  Head:  Scalp is NC/AT  Eyes:  Conjunctiva normal, PERRL  ENT:  The  external nose and ears are normal.   Neck:  Normal range of motion without rigidity.  CV:  Regular rate and rhythm    No pathologic murmur, rubs, or gallops.  Resp:  Breath sounds are clear bilaterally    Non-labored, no retractions or accessory muscle use  Abdomen: Abdomen is soft, no distension, no tenderness, no masses  MS:  1+ non-pitting edema to BL LE feet/shins and LUE.  RUE w/contractures, non-edematous.  Skin:  Warm and dry, No rash or lesions noted.  Neuro:  Non-verbal.  Quadriplegic.  Eyes open and tracks appropriately.        Emergency Department Course     Imaging:  US Lower Extremity Venous Duplex Bilateral   Final Result   IMPRESSION:   1.  No deep venous thrombosis in the bilateral lower extremities.      US Upper Extremity Venous Duplex Left   Final Result   IMPRESSION:    1.  No deep venous thrombosis in the left upper extremity.      Chest XR,  PA & LAT   Final Result   IMPRESSION: Low lung volumes with moderate bibasilar atelectasis. Right basilar consolidation, better seen on CT 09/09/2023. Narrowing of the upper trachea is also better seen on CT and suspicious for focal tracheal stenosis. No pneumothorax. No pleural    effusion.        Report per radiology    Laboratory:  Labs Ordered and Resulted from Time of ED Arrival to Time of ED Departure   COMPREHENSIVE METABOLIC PANEL - Abnormal       Result Value    Sodium 133 (*)     Potassium 4.2      Chloride 101      Carbon Dioxide (CO2) 22      Anion Gap 10      Urea Nitrogen 12.4      Creatinine 0.81      Calcium 8.1 (*)     Glucose 84      Alkaline Phosphatase 117      AST 47 (*)     ALT 49      Protein Total 6.6      Albumin 3.1 (*)     Bilirubin Total 0.3      GFR Estimate >90     NT PROBNP INPATIENT - Abnormal    N terminal Pro BNP Inpatient 3,143 (*)    TSH WITH FREE T4 REFLEX - Abnormal    TSH <0.01 (*)    T4 FREE - Abnormal    Free T4 0.85 (*)    CBC WITH PLATELETS AND DIFFERENTIAL    WBC Count 8.3      RBC Count 4.89      Hemoglobin 14.1       Hematocrit 43.6      MCV 89      MCH 28.8      MCHC 32.3      RDW 14.5      Platelet Count 187      % Neutrophils 49      % Lymphocytes 34      % Monocytes 10      % Eosinophils 5      % Basophils 1      % Immature Granulocytes 1      NRBCs per 100 WBC 0      Absolute Neutrophils 4.2      Absolute Lymphocytes 2.8      Absolute Monocytes 0.8      Absolute Eosinophils 0.4      Absolute Basophils 0.1      Absolute Immature Granulocytes 0.0      Absolute NRBCs 0.0          Emergency Department Course & Assessments:       Interventions:  Medications   Brivaracetam (BRIVIACT) solution 100 mg (100 mg Oral $Given 23)   furosemide (LASIX) tablet 40 mg (40 mg Oral $Given 23)      Assessments:   I obtained history and examined the patient as noted above.    I rechecked and updated the patient. I deemed the patient safe to discharge home.    Independent Interpretation (X-rays, CTs, rhythm strip):  I independently interpreted the patient's chest Xray w/right basilar consolidation.  No cardiomegaly pulmonary edema or pleural effusions.    Consultations/Discussion of Management or Tests:  None       Social Determinants of Health affecting care:  Transportation-unable to ambulate or drive to appointments.     Disposition:  The patient was discharged to home.     Impression & Plan    CMS Diagnoses: None    Medical Decision Makin yo male well known to this facility just discharged yesterday after admission for aspiration pneumonia with sepsis and ALMAS.  During hospitalization received large volumes of IV fluids.  Back today due to peripheral edema.  Likely due to iatrogenic fluid overload.  BNP is elevated as would be expected fortunately not hypoxic chest x-ray without signs of pulmonary edema/pleural effusions work of breathing good.  Kidney and liver function unremarkable.  Given recent hospitalization and immobility did consider DVT but fortunately ultrasound is negative for this.  No other  concerns at this time.    We will begin gentle diuresis with Lasix.  Given first dose here.  Will write for 2 more days for home should not need ongoing diuresis is no history of CHF and no longer receiving IV fluids.  TSH is undetectable consistent with likely treatment with Synthroid Free T4 is slightly low however consistent with probable minor underdosing of this.  Does not clinically appear to have myxedema coma no hypotension hypoglycemia or other findings to suggest this and I do not think this is related to symptoms today but should follow-up with PCP for potential discussion of dose adjustment.  Close follow-up with PCP for reevaluation of kidney function and electrolytes return for new worsening or changing symptoms.    Diagnosis:    ICD-10-CM    1. Fluid overload  E87.70       2. Hypothyroidism, unspecified type  E03.9          Discharge Medications:  New Prescriptions    FUROSEMIDE (LASIX) 20 MG TABLET    Take 1 tablet (20 mg) by mouth daily for 2 days      Scribe Disclosure:  I, Zheng Gagnon, am serving as a scribe at 5:30 PM on 9/14/2023 to document services personally performed by Akil Nguyen PA-C based on my observations and the provider's statements to me.               Akil Nguyen PA-C  09/15/23 0030

## 2023-09-14 NOTE — ED TRIAGE NOTES
BIBA from home where mother is concerned of pt's bilateral feet swelling and hand swelling. Pt is a quadriplegic just recently discharged from hospital for 'infection'.

## 2023-09-14 NOTE — TELEPHONE ENCOUNTER
"Pt's mom called the clinic reporting that pt is having difficulty moving his left arm, new onset. Pt was triaged and dispo'd to ED. Pt's mom verbalizes understanding and agrees to plan of care.     1. ONSET: \"When did the swelling start?\" (e.g., minutes, hours, days)      Pt was discharged from hospital yesterday. Pt's mom who is primary care giver is noticing that pt seems to be having some weakness in his left arm along with swelling. Arm feels cool.   Blanchable swelling in hand. Voiding.   2. LOCATION: \"What part of the arm is swollen?\"  \"Are both arms swollen or just one arm?\"      Left hand and lower part of arm.   3. SEVERITY: \"How bad is the swelling?\" (e.g., localized; mild, moderate, severe)    - LOCALIZED: Small area of puffiness or swelling on just one arm    - JOINT SWELLING: Swelling of one joint    - MILD: Puffiness or swelling of hand    - MODERATE: Puffiness or swollen feeling of entire arm     - SEVERE: All of arm looks swollen; pitting edema      Moderate, wondering if it is from IV   4. REDNESS: \"Does the swelling look red or infected?\"      Denies   5. PAIN: \"Is the swelling painful to touch?\" If Yes, ask: \"How painful is it?\"   (Scale 1-10; mild, moderate or severe)      Denies   6. FEVER: \"Do you have a fever?\" If Yes, ask: \"What is it, how was it measured, and when did it start?\"       Denies   7. CAUSE: \"What do you think is causing the arm swelling?\"      IV site  8. MEDICAL HISTORY: \"Do you have a history of heart failure, kidney disease, liver failure, or cancer?\"      Denies   9. RECURRENT SYMPTOM: \"Have you had arm swelling before?\" If Yes, ask: \"When was the last time?\" \"What happened that time?\"      Never happened before   10. OTHER SYMPTOMS: \"Do you have any other symptoms?\" (e.g., chest pain, difficulty breathing)        Denies   Temp 97.6 under arm.       Reason for Disposition   [1] Can't use arm or can barely use arm AND [2] new-onset    Additional Information   Negative: SEVERE " difficulty breathing (e.g., struggling for each breath, speaks in single words)   Negative: Sounds like a life-threatening emergency to the triager   Negative: Chest pain   Negative: Followed an arm injury   Negative: [1] Followed an insect bite AND [2] localized swelling (e.g., small area of puffy or swollen skin)   Negative: Swelling of wrist is main symptom   Negative: Swelling of elbow is main symptom   Negative: Cast problems or questions   Negative: Pain, redness, or swelling intravenous (IV) site or along course of vein   Negative: Difficulty breathing at rest   Negative: Looks like a broken bone or dislocated joint (e.g., crooked or deformed)   Negative: Entire hand is cool or blue in comparison to other side   Negative: [1] MODERATE arm swelling (e.g., puffiness or swollen feeling of entire arm) and [2] PICC Line   Negative: [1] MODERATE arm swelling and [2] central venous catheter (central line, internal jugular line)   Negative: SEVERE arm swelling (e.g., all of arm looks swollen)    Protocols used: Arm Swelling and Edema-A-AH

## 2023-09-15 ENCOUNTER — MEDICAL CORRESPONDENCE (OUTPATIENT)
Dept: HEALTH INFORMATION MANAGEMENT | Facility: CLINIC | Age: 63
End: 2023-09-15
Payer: MEDICARE

## 2023-09-15 VITALS
DIASTOLIC BLOOD PRESSURE: 80 MMHG | RESPIRATION RATE: 18 BRPM | SYSTOLIC BLOOD PRESSURE: 115 MMHG | HEART RATE: 65 BPM | TEMPERATURE: 97.8 F | OXYGEN SATURATION: 96 %

## 2023-09-15 NOTE — DISCHARGE INSTRUCTIONS
Your symptoms today are likely the result of the fluid that was administered to you during hospitalization to help treat your infection and kidney issues.  To help with this I am going to prescribe a couple of doses of medication to help you urinate off the extra fluid.  You should follow-up closely with your primary care provider in the next several days to recheck your kidney function/electrolytes and reassess your symptoms.  Also follow-up to discuss the dosing of your thyroid medication as you may need to increase this slightly but this is not the cause of your symptoms today.

## 2023-09-15 NOTE — ED PROVIDER NOTES
Emergency Department Attending Supervision Note  9/14/2023  7:26 PM      I evaluated this patient in conjunction with Akil Nguyen PA-C      Briefly, the patient presented with edema to the bilateral lower extremities and upper extremities, he was recently admitted to the hospital in treated with aggressive IV fluids for acute kidney injury.  He is immobile at baseline.      On my exam:  /84   Pulse 64   Temp 97.8  F (36.6  C) (Oral)   Resp 18   SpO2 98%    General: Alert interactive  Cardiovascular: Regular rate and rhythm  Lungs: Coarse to auscultation bilaterally  Musculoskeletal: Trace edema in the bilateral lower extremities, edema in the left upper extremity    Results:  Labs Ordered and Resulted from Time of ED Arrival to Time of ED Departure   COMPREHENSIVE METABOLIC PANEL - Abnormal       Result Value    Sodium 133 (*)     Potassium 4.2      Chloride 101      Carbon Dioxide (CO2) 22      Anion Gap 10      Urea Nitrogen 12.4      Creatinine 0.81      Calcium 8.1 (*)     Glucose 84      Alkaline Phosphatase 117      AST 47 (*)     ALT 49      Protein Total 6.6      Albumin 3.1 (*)     Bilirubin Total 0.3      GFR Estimate >90     NT PROBNP INPATIENT - Abnormal    N terminal Pro BNP Inpatient 3,143 (*)    TSH WITH FREE T4 REFLEX - Abnormal    TSH <0.01 (*)    T4 FREE - Abnormal    Free T4 0.85 (*)    CBC WITH PLATELETS AND DIFFERENTIAL    WBC Count 8.3      RBC Count 4.89      Hemoglobin 14.1      Hematocrit 43.6      MCV 89      MCH 28.8      MCHC 32.3      RDW 14.5      Platelet Count 187      % Neutrophils 49      % Lymphocytes 34      % Monocytes 10      % Eosinophils 5      % Basophils 1      % Immature Granulocytes 1      NRBCs per 100 WBC 0      Absolute Neutrophils 4.2      Absolute Lymphocytes 2.8      Absolute Monocytes 0.8      Absolute Eosinophils 0.4      Absolute Basophils 0.1      Absolute Immature Granulocytes 0.0      Absolute NRBCs 0.0          US Lower Extremity Venous Duplex  Bilateral   Final Result   IMPRESSION:   1.  No deep venous thrombosis in the bilateral lower extremities.      US Upper Extremity Venous Duplex Left   Final Result   IMPRESSION:    1.  No deep venous thrombosis in the left upper extremity.      Chest XR,  PA & LAT   Final Result   IMPRESSION: Low lung volumes with moderate bibasilar atelectasis. Right basilar consolidation, better seen on CT 09/09/2023. Narrowing of the upper trachea is also better seen on CT and suspicious for focal tracheal stenosis. No pneumothorax. No pleural    effusion.           ED course:  Medications   furosemide (LASIX) tablet 40 mg (has no administration in time range)   Brivaracetam (BRIVIACT) solution 100 mg (100 mg Oral $Given 9/14/23 2130)        Medical decision making: Following presentation history and physical examination were performed, previous records reviewed.  Findings are consistent with peripheral edema likely secondary to fluid overload from his recent hospitalization.  There is no signs of an acute infectious etiology.  No signs of DVT in the upper or lower extremities.  At this point I believe gentle diuresis is appropriate and I believe the patient can be discharged home.  There is no signs of overt pulmonary edema or more concerning illness.        Diagnosis    ICD-10-CM    1. Fluid overload  E87.70       2. Hypothyroidism, unspecified type  E03.9                   Trigger, Jake Ash MD  09/14/23 214

## 2023-09-20 ENCOUNTER — VIRTUAL VISIT (OUTPATIENT)
Dept: FAMILY MEDICINE | Facility: CLINIC | Age: 63
End: 2023-09-20
Payer: MEDICARE

## 2023-09-20 DIAGNOSIS — E03.9 HYPOTHYROIDISM, UNSPECIFIED TYPE: ICD-10-CM

## 2023-09-20 DIAGNOSIS — E03.8 SECONDARY HYPOTHYROIDISM: ICD-10-CM

## 2023-09-20 DIAGNOSIS — M79.89 BILATERAL SWELLING OF FEET: Primary | ICD-10-CM

## 2023-09-20 DIAGNOSIS — Z12.11 COLON CANCER SCREENING: ICD-10-CM

## 2023-09-20 PROCEDURE — 99442 PR PHYSICIAN TELEPHONE EVALUATION 11-20 MIN: CPT | Mod: 95 | Performed by: INTERNAL MEDICINE

## 2023-09-20 RX ORDER — LEVOTHYROXINE SODIUM 137 UG/1
137 TABLET ORAL DAILY
Qty: 90 TABLET | Refills: 0 | Status: SHIPPED | OUTPATIENT
Start: 2023-09-20 | End: 2023-11-01

## 2023-09-20 NOTE — PROGRESS NOTES
Keyon is a 63 year old who is being evaluated via a billable telephone visit.      What phone number would you like to be contacted at? [phone]   How would you like to obtain your AVS? Mail a copy    Distant Location (provider location):  On-site    Assessment & Plan     Bilateral swelling of feet  Stable.    Hypothyroidism, unspecified type  - levothyroxine (SYNTHROID/LEVOTHROID) 137 MCG tablet; Take 1 tablet (137 mcg) by mouth daily  - TSH; Future    Colon cancer screening  - Fecal colorectal cancer screen FIT - Future (S+30); Future       MED REC REQUIRED  Post Medication Reconciliation Status:  Discharge medications reconciled and changed, see notes/orders  See Patient Instructions    Elsa Queen MD  [facility]    Subjective   Keyon is a 63 year old, presenting for the following health issues:  ER F/U    HPI     ED/UC Followup:    Facility:  [facility] Emergency Dept  Date of visit: 9/14/23-9/15/23  Reason for visit: Fluid overload   Hypothyroidism, unspecified type   Current Status: improved    Keyon was taken to the ER for swollen feet after hospital discharge.     Was found to be in fluid overload, no hx of CHF, was given lasix for 3 doses and his feet are better now.    TSH in ER was low. Decreasing levothyroxine to 137 mcg daily  Colon cancer screening - FIT test    Medications reviewed. List is updated.     Review of Systems       Objective       Vitals:  No vitals were obtained today due to virtual visit.    Physical Exam   GEN: No acute distress  RESP: No audible increased work of breathing. Patient speaking in full sentences without distress.  PSYCH: pleasant  Exam otherwise limited due to virtual platform          Phone call duration: 15 minutes

## 2023-09-20 NOTE — PROGRESS NOTES
Keyon is a 63 year old who is being evaluated via a billable telephone visit.      What phone number would you like to be contacted at? [phone]  How would you like to obtain your AVS? Mail a copy  {PROVIDER LOCATION On-site should be selected for visits conducted from your clinic location or adjoining [facility] hospital, academic office, or other nearby [facility] building. Off-site should be selected for all other provider locations, including home:780105}  Distant Location (provider location):  On-site    {PROVIDER CHARTING PREFERENCE:835218}    Subjective   Keyon is a 63 year old, presenting for the following health issues:  Hospital F/U  {(!) Visit Details have not yet been documented.  Please enter Visit Details and then use this list to pull in documentation. (Optional):140979}    HPI     {MA/LPN/RN Pre-Provider Visit Orders- hCG/UA/Strep (Optional):764404}  ED/UC Followup:    Facility:  [facility]   Date of visit: 9/14/2023 - 9/15/2023   Reason for visit: Foot Swelling  hand swelling   Current Status: ***  {additonal problems for provider to add (Optional):211782}      Review of Systems   {ROS COMP (Optional):617098}      Objective           Vitals:  No vitals were obtained today due to virtual visit.    Physical Exam   {GENERAL APPEARANCE:50}  PSYCH: Alert and oriented times 3; coherent speech, normal   rate and volume, able to articulate logical thoughts, able   to abstract reason, no tangential thoughts, no hallucinations   or delusions  His affect is { :3062771}  RESP: No cough, no audible wheezing, able to talk in full sentences  Remainder of exam unable to be completed due to telephone visits    {Diagnostic Test Results (Optional):764646}    {AMBULATORY ATTESTATION (Optional):465234}        Phone call duration: *** minutes

## 2023-09-21 ENCOUNTER — HOSPITAL ENCOUNTER (OUTPATIENT)
Facility: CLINIC | Age: 63
Discharge: HOME | End: 2023-09-21
Admitting: RADIOLOGY
Payer: MEDICARE

## 2023-09-21 ENCOUNTER — APPOINTMENT (OUTPATIENT)
Dept: INTERVENTIONAL RADIOLOGY/VASCULAR | Facility: CLINIC | Age: 63
End: 2023-09-21
Attending: NURSE PRACTITIONER
Payer: MEDICARE

## 2023-09-21 ENCOUNTER — TELEPHONE (OUTPATIENT)
Dept: FAMILY MEDICINE | Facility: CLINIC | Age: 63
End: 2023-09-21
Payer: MEDICARE

## 2023-09-21 VITALS
OXYGEN SATURATION: 96 % | HEART RATE: 77 BPM | DIASTOLIC BLOOD PRESSURE: 75 MMHG | HEIGHT: 72 IN | SYSTOLIC BLOOD PRESSURE: 103 MMHG | BODY MASS INDEX: 23.03 KG/M2 | WEIGHT: 170 LBS | RESPIRATION RATE: 18 BRPM | TEMPERATURE: 97.5 F

## 2023-09-21 DIAGNOSIS — R13.10 DYSPHAGIA, UNSPECIFIED TYPE: ICD-10-CM

## 2023-09-21 PROCEDURE — C1769 GUIDE WIRE: HCPCS

## 2023-09-21 PROCEDURE — 999N000163 HC STATISTIC SIMPLE TUBE INSERTION/CHARGE, PORT, CATH, FISTULOGRAM

## 2023-09-21 PROCEDURE — 49452 REPLACE G-J TUBE PERC: CPT

## 2023-09-21 PROCEDURE — 272N000116 HC CATH CR1

## 2023-09-21 RX ORDER — LEVOTHYROXINE SODIUM 150 UG/1
150 TABLET ORAL DAILY
Qty: 112 TABLET | Refills: 3 | Status: ON HOLD | OUTPATIENT
Start: 2023-09-21 | End: 2023-10-28

## 2023-09-21 ASSESSMENT — ACTIVITIES OF DAILY LIVING (ADL): ADLS_ACUITY_SCORE: 37

## 2023-09-21 NOTE — PROGRESS NOTES
[facility] Discharge Nursing Note    Patient Information  Name: Keyon Farias  Age: 63 year old    Discharge Education:  Discharge instructions reviewed: Yes  Additional education/resources provided: Yes  Patient/patient representative verbalizes understanding: Yes  Patient discharging on new medications: No  Medication education completed: N/A    Discharge Plans:   Discharge location: home  Discharge ride contacted: Yes  Approximate discharge time: 1150      Discharge Criteria:  Discharge criteria met and vital signs stable: Yes    Patient Belongs:  Patient belongings returned to patient: Yes    Shea Hernandez RN

## 2023-09-21 NOTE — IR NOTE
Interventional Radiology Intra-procedural Nursing Note    Patient Name: Keyon Farias  Medical Record Number: [id number]  Today's Date: September 21, 2023    Start Time: 1125  End of procedure time: 1130  Procedure: Consented for gastro-jejunostomy tube exchange.  Report given to: Jerald [facility] RN  Time pt departs:  1140    Other Notes: Pt into IR suite 2 via cart. Pt awake and alert. To table in supine position. Monitoring equipment applied. VSS. Tele SR. Dr. Dixon in room. Time out and procedure started. Pt tolerated procedure well. Debrief with Dr. iDxon. GJ-tube placed and pressure held until hemostasis achieved. Dressing CDI. No complications. Pt transferred back to [facility].    Medications:    None    Thomas Baumann RN

## 2023-09-21 NOTE — PROGRESS NOTES
[facility] Admission Nursing Note    Patient Information  Name: Keyon Farias  Age: 63 year old  Reason for admission: GTube Change  [facility] arrival time: 1040    Visitor Information  Name: Savannah     Patient Admission/Assessment   Pre-procedure assessment complete: Yes  If abnormal assessment/labs, provider notified: N/A  NPO: N/A  Medications held per instructions/orders: N/A  Consent: deferred  If applicable, pregnancy test status: deferred  Patient oriented to room: Yes  Education/questions answered: Yes  Plan/other: Prep for Procedure    Discharge Planning  Discharge name/phone number: Savannah (Mom)  Overnight post sedation caregiver: Savannah  Discharge location: home    Jerald Ny RN

## 2023-09-21 NOTE — PROCEDURES
[facility]    Procedure: Gastrojejunostomy tube exchange    Date/Time: 9/21/2023 11:33 AM    Performed by: Too Dixon MD  Authorized by: Too Dixon MD      UNIVERSAL PROTOCOL   Site Marked: NA  Prior Images Obtained and Reviewed:  Yes  Required items: Required blood products, implants, devices and special equipment available    Patient identity confirmed:  Verbally with patient, arm band, provided demographic data and hospital-assigned identification number  Patient was reevaluated immediately before administering moderate or deep sedation or anesthesia  Confirmation Checklist:  Patient's identity using two indicators, relevant allergies, procedure was appropriate and matched the consent or emergent situation and correct equipment/implants were available  Time out: Immediately prior to the procedure a time out was called    Universal Protocol: the Joint Commission Universal Protocol was followed    Preparation: Patient was prepped and draped in usual sterile fashion       ANESTHESIA    Anesthesia: Local infiltration  Local Anesthetic:  Lidocaine 1% without epinephrine      SEDATION    Patient Sedated: No    See dictated procedure note for full details.  Findings: Successful gastrojejunostomy tube exchange.    Specimens: none    Complications: None    Condition: Stable      PROCEDURE    Patient Tolerance:  Patient tolerated the procedure well with no immediate complications  Length of time physician/provider present for 1:1 monitoring during sedation: 0    Juancarlos Dixon MD  Interventional Radiology

## 2023-09-21 NOTE — DISCHARGE INSTRUCTIONS
G-tube Exchange Discharge Instructions     After you go home:    You may resume your normal diet    Care of Insertion Site:    For the first 48 hrs, check your puncture site every couple hours while you are awake   You may remove/change the dressing tomorrow  You may shower tomorrow  No tub baths, whirlpools or swimming until your puncture site has fully healed     Activity     You may go back to normal activity in 24 hours  Wait 48 hours before lifting, straining, exercise or other strenuous activity    Medicines:    You may resume all medications  Resume your Warfarin/Coumadin at your regular dose today. Follow up with your provider to have your INR rechecked  Resume your Platelet Inhibitors and Aspirin tomorrow at your regular dose  For minor pain, you may take Acetaminophen (Tylenol) or Ibuprofen (Advil)                 Call the provider who ordered this procedure if:    Blood or fluid is draining from the site  The site is red, swollen, hot, tender or there is foul-smelling drainage  Chills or a fever greater than 101 F (38 C)  Increased pain at the site  Any questions or concerns    Call  911 or go to the Emergency Room if:    Severe pain or trouble breathing  Bleeding that you cannot control      If you have questions call:          [facility] Radiology Dept @ [phone]        The provider who performed your procedure was _________________.        Caring for your G-tube    Tube Maintenance:    For ENFit Tubes:    Do NOT over tighten the connections (the purple end & hub connection).    Clean the connecting ends (especially the hub) every day.    If you are unable to disconnect the tubing ends, soak the connection in warm water (or wrap in a wet warm washcloth) to try and loosen the connection.    Possible problems with your tube may include:    Clogged with medications or feedings - most obstructions can be cleared with a small (3cc) syringe and warm water. This may be repeated until the tube is  unclogged. This can be prevented by frequently flushing the tube with water (60cc) during the day and always after medications & feedings.    Tube pulls out or falls out -cover the opening with gauze & tape. Call [phone] for further instructions    Skin breakdown and/or yeast infections at the insertion site - use of skin barrier ointments and anti-fungal powders can treat most site irritations.  Ask your pharmacist or provider for assistance (a prescription is not necessary).    In general, tube problems (including pulled tubes) are NOT emergency situations. Unless the pulling out of a tube is accompanied by uncontrollable bleeding, please DO NOT GO TO THE EMERGENCY ROOM!  Call [phone] with problems.    Tube Care:    Change the gauze dressing every 24 hours and if soiled (dirty).  Stabilize all tubes securely by using gauze and tape.  Clean tube site with soap & water using a cotton applicator (Q-tip) as needed to prevent irritation.  Flush feeding tube with 60cc of warm or room temperature water before and after meds.  To prevent the tube from clogging, ask your provider or pharmacist if liquid forms of your medications are available. If not, crush the pills well & be sure to flush the tube before & after all medications.  Flush feeding tube a minimum of every 4 hours and when feeding is completed with 60cc of water to keep the tube clear and avoid clogging.  Pt may use an abdominal (waist) binder to protect the G-tube.    If there is continued oozing or bleeding, redness, yellow/green/foul smelling drainage    STOP the feedings & use of the tube immediately if there is:    Continued oozing or bleeding at the site  Redness  Yellow/green or foul smelling drainage at the site  Uncontrolled stomach pain    Many of the supplies mentioned above can be purchased at your local pharmacy      For issues with your tube, please call:    [facility] Dept at [phone]

## 2023-09-21 NOTE — TELEPHONE ENCOUNTER
levothyroxine (SYNTHROID/LEVOTHROID) tablet 150 mcg (Discontinued          150 mcg DAILY 9/9/2023 9/13/2023     Thyroid Protocol [id number]/20/2023 05:54 AM   Protocol Details Normal TSH on file in past 12 months     Lab Test 09/14/23 1813   TSH <0.01*

## 2023-09-21 NOTE — TELEPHONE ENCOUNTER
TO PCP    Home Care is calling regarding an established patient with [facility].       Requesting orders from: Elsa Queen  Provider is following patient: Yes  Is this a 60-day recertification request?  No    Orders Requested    Physical Therapy  Request for initial certification (first set of orders)   Frequency:  1x/wk for 4 wks  Then once every other wk for 4 wks.    Speech Therapy  Request for initial evaluation and treatment (one time)       Information was gathered and will be sent to provider for review.  RN will contact Home Care with information after provider review.  Confirmed ok to leave a detailed message with call back.  Contact information confirmed and updated as needed.    Kamryn Murcia RN

## 2023-09-21 NOTE — PROGRESS NOTES
Care Suites Post Procedure Note    Patient Information  Name: Keyon Farias  Age: 63 year old    Post Procedure  Time patient returned to Care Suites: 1135  Concerns/abnormal assessment: NA  If abnormal assessment, provider notified: N/A  Plan/Other: Proceed per orders. Pt arrived back to  [facility]. Pt is a quadriplegic, nidia used to get him into his chair,  No sedation or IV medication. Vitals taken and discharge papers given to mother.       Shea Hernandez RN     [id number] [facility]

## 2023-09-21 NOTE — TELEPHONE ENCOUNTER
Free T4   Date Value Ref Range Status   09/14/2023 0.85 (L) 0.90 - 1.70 ng/dL Final       Electrodesiccation And Curettage Text: The wound bed was treated with electrodesiccation and curettage after the biopsy was performed.

## 2023-09-22 NOTE — TELEPHONE ENCOUNTER
Verbal given for requested homecare orders.    Raissa Recio, RN  [facility] RN Triage Team

## 2023-09-25 ENCOUNTER — APPOINTMENT (OUTPATIENT)
Dept: GENERAL RADIOLOGY | Facility: CLINIC | Age: 63
DRG: 871 | End: 2023-09-25
Attending: EMERGENCY MEDICINE
Payer: MEDICARE

## 2023-09-25 ENCOUNTER — HOSPITAL ENCOUNTER (INPATIENT)
Facility: CLINIC | Age: 63
LOS: 8 days | Discharge: HOME HEALTH | DRG: 871 | End: 2023-10-03
Attending: EMERGENCY MEDICINE | Admitting: STUDENT IN AN ORGANIZED HEALTH CARE EDUCATION/TRAINING PROGRAM
Payer: MEDICARE

## 2023-09-25 ENCOUNTER — APPOINTMENT (OUTPATIENT)
Dept: CT IMAGING | Facility: CLINIC | Age: 63
DRG: 871 | End: 2023-09-25
Attending: EMERGENCY MEDICINE
Payer: MEDICARE

## 2023-09-25 DIAGNOSIS — R65.21 SEPTIC SHOCK (H): ICD-10-CM

## 2023-09-25 DIAGNOSIS — A41.9 SEPTIC SHOCK (H): ICD-10-CM

## 2023-09-25 DIAGNOSIS — J18.9 RECURRENT PNEUMONIA: ICD-10-CM

## 2023-09-25 DIAGNOSIS — Z99.3 WHEELCHAIR DEPENDENCE: Primary | ICD-10-CM

## 2023-09-25 LAB
ABO/RH(D): NORMAL
ALBUMIN SERPL BCG-MCNC: 3.7 G/DL (ref 3.5–5.2)
ALBUMIN UR-MCNC: 30 MG/DL
ALLEN'S TEST: ABNORMAL
ALP SERPL-CCNC: 115 U/L (ref 40–129)
ALT SERPL W P-5'-P-CCNC: 31 U/L (ref 0–70)
ANION GAP SERPL CALCULATED.3IONS-SCNC: 18 MMOL/L (ref 7–15)
ANTIBODY SCREEN: NEGATIVE
APPEARANCE UR: CLEAR
APTT PPP: 33 SECONDS (ref 22–38)
AST SERPL W P-5'-P-CCNC: 85 U/L (ref 0–45)
BASE EXCESS BLDA CALC-SCNC: 5.8 MMOL/L (ref -9–1.8)
BASOPHILS # BLD MANUAL: 0.3 10E3/UL (ref 0–0.2)
BASOPHILS NFR BLD MANUAL: 2 %
BILIRUB SERPL-MCNC: 0.5 MG/DL
BILIRUB UR QL STRIP: NEGATIVE
BUN SERPL-MCNC: 55 MG/DL (ref 8–23)
CALCIUM SERPL-MCNC: 8.9 MG/DL (ref 8.8–10.2)
CHLORIDE SERPL-SCNC: 82 MMOL/L (ref 98–107)
COLOR UR AUTO: YELLOW
CREAT SERPL-MCNC: 2.12 MG/DL (ref 0.67–1.17)
DEPRECATED HCO3 PLAS-SCNC: 37 MMOL/L (ref 22–29)
EGFRCR SERPLBLD CKD-EPI 2021: 35 ML/MIN/1.73M2
EOSINOPHIL # BLD MANUAL: 0.3 10E3/UL (ref 0–0.7)
EOSINOPHIL NFR BLD MANUAL: 2 %
ERYTHROCYTE [DISTWIDTH] IN BLOOD BY AUTOMATED COUNT: 14.7 % (ref 10–15)
FLUAV RNA SPEC QL NAA+PROBE: NEGATIVE
FLUBV RNA RESP QL NAA+PROBE: NEGATIVE
GLUCOSE BLDC GLUCOMTR-MCNC: 264 MG/DL (ref 70–99)
GLUCOSE SERPL-MCNC: 298 MG/DL (ref 70–99)
GLUCOSE UR STRIP-MCNC: NEGATIVE MG/DL
HCO3 BLD-SCNC: 29 MMOL/L (ref 21–28)
HCO3 BLDV-SCNC: 45 MMOL/L (ref 21–28)
HCT VFR BLD AUTO: 51.9 % (ref 40–53)
HGB BLD-MCNC: 17.2 G/DL (ref 13.3–17.7)
HGB UR QL STRIP: NEGATIVE
HYALINE CASTS: 1 /LPF
INR PPP: 1.17 (ref 0.85–1.15)
KETONES UR STRIP-MCNC: NEGATIVE MG/DL
LACTATE BLD-SCNC: 4 MMOL/L
LACTATE SERPL-SCNC: 5.4 MMOL/L (ref 0.7–2)
LEUKOCYTE ESTERASE UR QL STRIP: NEGATIVE
LIPASE SERPL-CCNC: 88 U/L (ref 13–60)
LYMPHOCYTES # BLD MANUAL: 5.2 10E3/UL (ref 0.8–5.3)
LYMPHOCYTES NFR BLD MANUAL: 33 %
MAGNESIUM SERPL-MCNC: 3.2 MG/DL (ref 1.7–2.3)
MCH RBC QN AUTO: 29.2 PG (ref 26.5–33)
MCHC RBC AUTO-ENTMCNC: 33.1 G/DL (ref 31.5–36.5)
MCV RBC AUTO: 88 FL (ref 78–100)
MONOCYTES # BLD MANUAL: 0.9 10E3/UL (ref 0–1.3)
MONOCYTES NFR BLD MANUAL: 6 %
NEUTROPHILS # BLD MANUAL: 8.9 10E3/UL (ref 1.6–8.3)
NEUTROPHILS NFR BLD MANUAL: 57 %
NITRATE UR QL: NEGATIVE
O2/TOTAL GAS SETTING VFR VENT: 100 %
PCO2 BLD: 35 MM HG (ref 35–45)
PCO2 BLDV: 54 MM HG (ref 40–50)
PH BLD: 7.52 [PH] (ref 7.35–7.45)
PH BLDV: 7.53 [PH] (ref 7.32–7.43)
PH UR STRIP: 7 [PH] (ref 5–7)
PLAT MORPH BLD: ABNORMAL
PLATELET # BLD AUTO: 227 10E3/UL (ref 150–450)
PO2 BLD: 247 MM HG (ref 80–105)
PO2 BLDV: 42 MM HG (ref 25–47)
POTASSIUM SERPL-SCNC: 3 MMOL/L (ref 3.4–5.3)
PROCALCITONIN SERPL IA-MCNC: 0.23 NG/ML
PROT SERPL-MCNC: 7.9 G/DL (ref 6.4–8.3)
RBC # BLD AUTO: 5.89 10E6/UL (ref 4.4–5.9)
RBC MORPH BLD: ABNORMAL
RBC URINE: 1 /HPF
RSV RNA SPEC NAA+PROBE: NEGATIVE
SAO2 % BLDV: 82 % (ref 94–100)
SARS-COV-2 RNA RESP QL NAA+PROBE: NEGATIVE
SMUDGE CELLS BLD QL SMEAR: PRESENT
SODIUM SERPL-SCNC: 137 MMOL/L (ref 136–145)
SP GR UR STRIP: 1.02 (ref 1–1.03)
SPECIMEN EXPIRATION DATE: NORMAL
SQUAMOUS EPITHELIAL: <1 /HPF
TROPONIN T SERPL HS-MCNC: 57 NG/L
UROBILINOGEN UR STRIP-MCNC: 2 MG/DL
WBC # BLD AUTO: 15.7 10E3/UL (ref 4–11)
WBC URINE: <1 /HPF

## 2023-09-25 PROCEDURE — 82803 BLOOD GASES ANY COMBINATION: CPT | Performed by: STUDENT IN AN ORGANIZED HEALTH CARE EDUCATION/TRAINING PROGRAM

## 2023-09-25 PROCEDURE — 83690 ASSAY OF LIPASE: CPT | Performed by: EMERGENCY MEDICINE

## 2023-09-25 PROCEDURE — 250N000011 HC RX IP 250 OP 636: Performed by: STUDENT IN AN ORGANIZED HEALTH CARE EDUCATION/TRAINING PROGRAM

## 2023-09-25 PROCEDURE — 272N000007 HC KIT ART LINE INSERTION

## 2023-09-25 PROCEDURE — 83605 ASSAY OF LACTIC ACID: CPT

## 2023-09-25 PROCEDURE — 85730 THROMBOPLASTIN TIME PARTIAL: CPT | Performed by: EMERGENCY MEDICINE

## 2023-09-25 PROCEDURE — 96365 THER/PROPH/DIAG IV INF INIT: CPT | Mod: 59

## 2023-09-25 PROCEDURE — 93005 ELECTROCARDIOGRAM TRACING: CPT

## 2023-09-25 PROCEDURE — 250N000009 HC RX 250: Performed by: STUDENT IN AN ORGANIZED HEALTH CARE EDUCATION/TRAINING PROGRAM

## 2023-09-25 PROCEDURE — 258N000003 HC RX IP 258 OP 636: Performed by: STUDENT IN AN ORGANIZED HEALTH CARE EDUCATION/TRAINING PROGRAM

## 2023-09-25 PROCEDURE — 99292 CRITICAL CARE ADDL 30 MIN: CPT

## 2023-09-25 PROCEDURE — 96375 TX/PRO/DX INJ NEW DRUG ADDON: CPT

## 2023-09-25 PROCEDURE — 86901 BLOOD TYPING SEROLOGIC RH(D): CPT | Performed by: EMERGENCY MEDICINE

## 2023-09-25 PROCEDURE — 87077 CULTURE AEROBIC IDENTIFY: CPT | Performed by: EMERGENCY MEDICINE

## 2023-09-25 PROCEDURE — 96361 HYDRATE IV INFUSION ADD-ON: CPT

## 2023-09-25 PROCEDURE — 250N000011 HC RX IP 250 OP 636

## 2023-09-25 PROCEDURE — 5A1945Z RESPIRATORY VENTILATION, 24-96 CONSECUTIVE HOURS: ICD-10-PCS | Performed by: EMERGENCY MEDICINE

## 2023-09-25 PROCEDURE — 36620 INSERTION CATHETER ARTERY: CPT

## 2023-09-25 PROCEDURE — 82803 BLOOD GASES ANY COMBINATION: CPT

## 2023-09-25 PROCEDURE — 86850 RBC ANTIBODY SCREEN: CPT | Performed by: EMERGENCY MEDICINE

## 2023-09-25 PROCEDURE — 85610 PROTHROMBIN TIME: CPT | Performed by: EMERGENCY MEDICINE

## 2023-09-25 PROCEDURE — 71260 CT THORAX DX C+: CPT | Mod: MA

## 2023-09-25 PROCEDURE — 99291 CRITICAL CARE FIRST HOUR: CPT | Mod: 25

## 2023-09-25 PROCEDURE — 258N000003 HC RX IP 258 OP 636: Performed by: EMERGENCY MEDICINE

## 2023-09-25 PROCEDURE — 81001 URINALYSIS AUTO W/SCOPE: CPT | Performed by: EMERGENCY MEDICINE

## 2023-09-25 PROCEDURE — 999N000157 HC STATISTIC RCP TIME EA 10 MIN

## 2023-09-25 PROCEDURE — 999N000065 XR CHEST PORT 1 VIEW

## 2023-09-25 PROCEDURE — 84145 PROCALCITONIN (PCT): CPT | Performed by: EMERGENCY MEDICINE

## 2023-09-25 PROCEDURE — G1010 CDSM STANSON: HCPCS

## 2023-09-25 PROCEDURE — 36415 COLL VENOUS BLD VENIPUNCTURE: CPT | Performed by: EMERGENCY MEDICINE

## 2023-09-25 PROCEDURE — 94002 VENT MGMT INPAT INIT DAY: CPT

## 2023-09-25 PROCEDURE — 83735 ASSAY OF MAGNESIUM: CPT | Performed by: EMERGENCY MEDICINE

## 2023-09-25 PROCEDURE — 87637 SARSCOV2&INF A&B&RSV AMP PRB: CPT | Performed by: EMERGENCY MEDICINE

## 2023-09-25 PROCEDURE — 3E043XZ INTRODUCTION OF VASOPRESSOR INTO CENTRAL VEIN, PERCUTANEOUS APPROACH: ICD-10-PCS | Performed by: EMERGENCY MEDICINE

## 2023-09-25 PROCEDURE — 85007 BL SMEAR W/DIFF WBC COUNT: CPT | Performed by: EMERGENCY MEDICINE

## 2023-09-25 PROCEDURE — 36556 INSERT NON-TUNNEL CV CATH: CPT

## 2023-09-25 PROCEDURE — 250N000011 HC RX IP 250 OP 636: Performed by: EMERGENCY MEDICINE

## 2023-09-25 PROCEDURE — 99285 EMERGENCY DEPT VISIT HI MDM: CPT | Mod: 25

## 2023-09-25 PROCEDURE — 85027 COMPLETE CBC AUTOMATED: CPT | Performed by: EMERGENCY MEDICINE

## 2023-09-25 PROCEDURE — 84484 ASSAY OF TROPONIN QUANT: CPT | Performed by: EMERGENCY MEDICINE

## 2023-09-25 PROCEDURE — 31500 INSERT EMERGENCY AIRWAY: CPT

## 2023-09-25 PROCEDURE — 200N000001 HC R&B ICU

## 2023-09-25 PROCEDURE — 87040 BLOOD CULTURE FOR BACTERIA: CPT | Performed by: EMERGENCY MEDICINE

## 2023-09-25 PROCEDURE — 250N000013 HC RX MED GY IP 250 OP 250 PS 637: Performed by: EMERGENCY MEDICINE

## 2023-09-25 PROCEDURE — 80053 COMPREHEN METABOLIC PANEL: CPT | Performed by: EMERGENCY MEDICINE

## 2023-09-25 PROCEDURE — 96374 THER/PROPH/DIAG INJ IV PUSH: CPT | Mod: 59

## 2023-09-25 PROCEDURE — 4A133R1 MONITORING OF ARTERIAL SATURATION, PERIPHERAL, PERCUTANEOUS APPROACH: ICD-10-PCS | Performed by: EMERGENCY MEDICINE

## 2023-09-25 PROCEDURE — 99291 CRITICAL CARE FIRST HOUR: CPT | Performed by: INTERNAL MEDICINE

## 2023-09-25 PROCEDURE — 250N000009 HC RX 250: Performed by: EMERGENCY MEDICINE

## 2023-09-25 RX ORDER — GLYCOPYRROLATE 1 MG/1
1 TABLET ORAL 2 TIMES DAILY PRN
Status: DISCONTINUED | OUTPATIENT
Start: 2023-09-25 | End: 2023-09-26

## 2023-09-25 RX ORDER — BISACODYL 5 MG
10 TABLET, DELAYED RELEASE (ENTERIC COATED) ORAL DAILY PRN
Status: DISCONTINUED | OUTPATIENT
Start: 2023-09-25 | End: 2023-09-29

## 2023-09-25 RX ORDER — DEXTROSE MONOHYDRATE 25 G/50ML
25-50 INJECTION, SOLUTION INTRAVENOUS
Status: DISCONTINUED | OUTPATIENT
Start: 2023-09-25 | End: 2023-09-25

## 2023-09-25 RX ORDER — ALBUTEROL SULFATE 90 UG/1
6 AEROSOL, METERED RESPIRATORY (INHALATION) EVERY 4 HOURS PRN
Status: DISCONTINUED | OUTPATIENT
Start: 2023-09-25 | End: 2023-09-27

## 2023-09-25 RX ORDER — FENTANYL CITRATE 50 UG/ML
25 INJECTION, SOLUTION INTRAMUSCULAR; INTRAVENOUS
Status: DISCONTINUED | OUTPATIENT
Start: 2023-09-25 | End: 2023-09-25

## 2023-09-25 RX ORDER — POTASSIUM CHLORIDE 7.45 MG/ML
10 INJECTION INTRAVENOUS ONCE
Status: COMPLETED | OUTPATIENT
Start: 2023-09-25 | End: 2023-09-25

## 2023-09-25 RX ORDER — FENTANYL CITRATE-0.9 % NACL/PF 10 MCG/ML
200 PLASTIC BAG, INJECTION (ML) INTRAVENOUS ONCE
Status: COMPLETED | OUTPATIENT
Start: 2023-09-25 | End: 2023-09-25

## 2023-09-25 RX ORDER — NALOXONE HYDROCHLORIDE 0.4 MG/ML
0.4 INJECTION, SOLUTION INTRAMUSCULAR; INTRAVENOUS; SUBCUTANEOUS
Status: DISCONTINUED | OUTPATIENT
Start: 2023-09-25 | End: 2023-10-03 | Stop reason: HOSPADM

## 2023-09-25 RX ORDER — IOPAMIDOL 755 MG/ML
85 INJECTION, SOLUTION INTRAVASCULAR ONCE
Status: COMPLETED | OUTPATIENT
Start: 2023-09-25 | End: 2023-09-25

## 2023-09-25 RX ORDER — PROPOFOL 10 MG/ML
5-75 INJECTION, EMULSION INTRAVENOUS CONTINUOUS
Status: DISCONTINUED | OUTPATIENT
Start: 2023-09-25 | End: 2023-09-27

## 2023-09-25 RX ORDER — ETOMIDATE 2 MG/ML
23 INJECTION INTRAVENOUS ONCE
Status: COMPLETED | OUTPATIENT
Start: 2023-09-25 | End: 2023-09-25

## 2023-09-25 RX ORDER — FENTANYL CITRATE-0.9 % NACL/PF 10 MCG/ML
100 PLASTIC BAG, INJECTION (ML) INTRAVENOUS ONCE
Status: COMPLETED | OUTPATIENT
Start: 2023-09-25 | End: 2023-09-25

## 2023-09-25 RX ORDER — NICOTINE POLACRILEX 4 MG
15-30 LOZENGE BUCCAL
Status: DISCONTINUED | OUTPATIENT
Start: 2023-09-25 | End: 2023-10-03 | Stop reason: HOSPADM

## 2023-09-25 RX ORDER — PIPERACILLIN SODIUM, TAZOBACTAM SODIUM 3; .375 G/15ML; G/15ML
3.38 INJECTION, POWDER, LYOPHILIZED, FOR SOLUTION INTRAVENOUS EVERY 6 HOURS
Status: DISCONTINUED | OUTPATIENT
Start: 2023-09-26 | End: 2023-09-30

## 2023-09-25 RX ORDER — ACETYLCYSTEINE 200 MG/ML
1 SOLUTION ORAL; RESPIRATORY (INHALATION)
Status: DISCONTINUED | OUTPATIENT
Start: 2023-09-25 | End: 2023-10-03 | Stop reason: HOSPADM

## 2023-09-25 RX ORDER — PROPOFOL 10 MG/ML
INJECTION, EMULSION INTRAVENOUS
Status: COMPLETED
Start: 2023-09-25 | End: 2023-09-25

## 2023-09-25 RX ORDER — FENTANYL CITRATE-0.9 % NACL/PF 10 MCG/ML
PLASTIC BAG, INJECTION (ML) INTRAVENOUS
Status: COMPLETED
Start: 2023-09-25 | End: 2023-09-25

## 2023-09-25 RX ORDER — CARBAMAZEPINE 100 MG/5ML
150 SUSPENSION ORAL 3 TIMES DAILY
Status: DISCONTINUED | OUTPATIENT
Start: 2023-09-26 | End: 2023-10-03 | Stop reason: HOSPADM

## 2023-09-25 RX ORDER — ROPIVACAINE IN 0.9% SOD CHL/PF 0.1 %
.03-.125 PLASTIC BAG, INJECTION (ML) EPIDURAL CONTINUOUS
Status: DISCONTINUED | OUTPATIENT
Start: 2023-09-25 | End: 2023-09-25

## 2023-09-25 RX ORDER — NICOTINE POLACRILEX 4 MG
15-30 LOZENGE BUCCAL
Status: DISCONTINUED | OUTPATIENT
Start: 2023-09-25 | End: 2023-09-25

## 2023-09-25 RX ORDER — MULTIVITAMIN,THERAPEUTIC
1 TABLET ORAL DAILY
Status: DISCONTINUED | OUTPATIENT
Start: 2023-09-26 | End: 2023-09-26

## 2023-09-25 RX ORDER — DEXTROSE MONOHYDRATE 25 G/50ML
25-50 INJECTION, SOLUTION INTRAVENOUS
Status: DISCONTINUED | OUTPATIENT
Start: 2023-09-25 | End: 2023-10-03 | Stop reason: HOSPADM

## 2023-09-25 RX ORDER — ACETAMINOPHEN 650 MG/1
650 SUPPOSITORY RECTAL EVERY 4 HOURS PRN
Status: DISCONTINUED | OUTPATIENT
Start: 2023-09-25 | End: 2023-09-28

## 2023-09-25 RX ORDER — NALOXONE HYDROCHLORIDE 0.4 MG/ML
0.2 INJECTION, SOLUTION INTRAMUSCULAR; INTRAVENOUS; SUBCUTANEOUS
Status: DISCONTINUED | OUTPATIENT
Start: 2023-09-25 | End: 2023-10-03 | Stop reason: HOSPADM

## 2023-09-25 RX ORDER — BISACODYL 5 MG
5 TABLET, DELAYED RELEASE (ENTERIC COATED) ORAL DAILY PRN
Status: DISCONTINUED | OUTPATIENT
Start: 2023-09-25 | End: 2023-09-29

## 2023-09-25 RX ORDER — ACETAMINOPHEN 650 MG/1
650 SUPPOSITORY RECTAL ONCE
Status: COMPLETED | OUTPATIENT
Start: 2023-09-25 | End: 2023-09-25

## 2023-09-25 RX ORDER — GUAIFENESIN 200 MG/10ML
300 LIQUID ORAL 4 TIMES DAILY PRN
Status: DISCONTINUED | OUTPATIENT
Start: 2023-09-25 | End: 2023-10-03 | Stop reason: HOSPADM

## 2023-09-25 RX ORDER — SODIUM BICARBONATE 650 MG/1
650 TABLET ORAL 2 TIMES DAILY
Status: DISCONTINUED | OUTPATIENT
Start: 2023-09-25 | End: 2023-10-03 | Stop reason: HOSPADM

## 2023-09-25 RX ORDER — FLUDROCORTISONE ACETATE 0.1 MG/1
0.1 TABLET ORAL DAILY
Status: DISCONTINUED | OUTPATIENT
Start: 2023-09-25 | End: 2023-09-26

## 2023-09-25 RX ORDER — HEPARIN SODIUM 5000 [USP'U]/.5ML
5000 INJECTION, SOLUTION INTRAVENOUS; SUBCUTANEOUS EVERY 8 HOURS
Status: DISCONTINUED | OUTPATIENT
Start: 2023-09-26 | End: 2023-10-03 | Stop reason: HOSPADM

## 2023-09-25 RX ORDER — NOREPINEPHRINE BITARTRATE 0.02 MG/ML
.01-.6 INJECTION, SOLUTION INTRAVENOUS CONTINUOUS
Status: DISCONTINUED | OUTPATIENT
Start: 2023-09-25 | End: 2023-09-25

## 2023-09-25 RX ORDER — CHLORHEXIDINE GLUCONATE ORAL RINSE 1.2 MG/ML
15 SOLUTION DENTAL EVERY 12 HOURS
Status: DISCONTINUED | OUTPATIENT
Start: 2023-09-25 | End: 2023-09-28

## 2023-09-25 RX ORDER — ACETAMINOPHEN 325 MG/10.15ML
650 LIQUID ORAL EVERY 4 HOURS PRN
Status: DISCONTINUED | OUTPATIENT
Start: 2023-09-25 | End: 2023-10-03 | Stop reason: HOSPADM

## 2023-09-25 RX ORDER — POLYETHYLENE GLYCOL 3350 17 G/17G
17 POWDER, FOR SOLUTION ORAL DAILY PRN
Status: DISCONTINUED | OUTPATIENT
Start: 2023-09-25 | End: 2023-09-26

## 2023-09-25 RX ORDER — LEVOTHYROXINE SODIUM 75 UG/1
150 TABLET ORAL DAILY
Status: DISCONTINUED | OUTPATIENT
Start: 2023-09-26 | End: 2023-10-03 | Stop reason: HOSPADM

## 2023-09-25 RX ORDER — NOREPINEPHRINE BITARTRATE 0.06 MG/ML
.01-.6 INJECTION, SOLUTION INTRAVENOUS CONTINUOUS
Status: DISCONTINUED | OUTPATIENT
Start: 2023-09-25 | End: 2023-09-28

## 2023-09-25 RX ORDER — CARBAMAZEPINE 100 MG/5ML
100 SUSPENSION ORAL DAILY
Status: DISCONTINUED | OUTPATIENT
Start: 2023-09-26 | End: 2023-09-26

## 2023-09-25 RX ORDER — PIPERACILLIN SODIUM, TAZOBACTAM SODIUM 4; .5 G/20ML; G/20ML
4.5 INJECTION, POWDER, LYOPHILIZED, FOR SOLUTION INTRAVENOUS ONCE
Status: COMPLETED | OUTPATIENT
Start: 2023-09-25 | End: 2023-09-25

## 2023-09-25 RX ORDER — BISACODYL 5 MG
15 TABLET, DELAYED RELEASE (ENTERIC COATED) ORAL DAILY PRN
Status: DISCONTINUED | OUTPATIENT
Start: 2023-09-25 | End: 2023-09-29

## 2023-09-25 RX ORDER — ACETAMINOPHEN 325 MG/1
650 TABLET ORAL EVERY 4 HOURS PRN
Status: DISCONTINUED | OUTPATIENT
Start: 2023-09-25 | End: 2023-10-03 | Stop reason: HOSPADM

## 2023-09-25 RX ORDER — LINEZOLID 2 MG/ML
600 INJECTION, SOLUTION INTRAVENOUS EVERY 12 HOURS
Status: DISCONTINUED | OUTPATIENT
Start: 2023-09-25 | End: 2023-09-25 | Stop reason: ALTCHOICE

## 2023-09-25 RX ADMIN — Medication 0.3 MG/HR: at 23:49

## 2023-09-25 RX ADMIN — PROPOFOL 20 MCG/KG/MIN: 10 INJECTION, EMULSION INTRAVENOUS at 23:04

## 2023-09-25 RX ADMIN — Medication 100 MCG: at 20:16

## 2023-09-25 RX ADMIN — NOREPINEPHRINE BITARTRATE 0.4 MCG/KG/MIN: 0.06 INJECTION, SOLUTION INTRAVENOUS at 23:36

## 2023-09-25 RX ADMIN — SODIUM CHLORIDE 66 ML: 9 INJECTION, SOLUTION INTRAVENOUS at 19:18

## 2023-09-25 RX ADMIN — PIPERACILLIN AND TAZOBACTAM 4.5 G: 4; .5 INJECTION, POWDER, FOR SOLUTION INTRAVENOUS at 19:42

## 2023-09-25 RX ADMIN — ACETAMINOPHEN 650 MG: 650 SUPPOSITORY RECTAL at 19:38

## 2023-09-25 RX ADMIN — POTASSIUM CHLORIDE 10 MEQ: 7.46 INJECTION, SOLUTION INTRAVENOUS at 21:36

## 2023-09-25 RX ADMIN — ROCURONIUM BROMIDE 80 MG: 10 INJECTION, SOLUTION INTRAVENOUS at 20:30

## 2023-09-25 RX ADMIN — VANCOMYCIN HYDROCHLORIDE 1750 MG: 10 INJECTION, POWDER, LYOPHILIZED, FOR SOLUTION INTRAVENOUS at 20:50

## 2023-09-25 RX ADMIN — Medication 100 MCG: at 20:10

## 2023-09-25 RX ADMIN — ETOMIDATE INJECTION 23 MG: 2 SOLUTION INTRAVENOUS at 20:29

## 2023-09-25 RX ADMIN — NOREPINEPHRINE BITARTRATE 0.05 MCG/KG/MIN: 0.02 INJECTION, SOLUTION INTRAVENOUS at 20:32

## 2023-09-25 RX ADMIN — SODIUM CHLORIDE 1000 ML: 9 INJECTION, SOLUTION INTRAVENOUS at 18:48

## 2023-09-25 RX ADMIN — IOPAMIDOL 85 ML: 755 INJECTION, SOLUTION INTRAVENOUS at 19:18

## 2023-09-25 RX ADMIN — FENTANYL CITRATE 25 MCG: 50 INJECTION, SOLUTION INTRAMUSCULAR; INTRAVENOUS at 20:25

## 2023-09-25 RX ADMIN — VASOPRESSIN 2.4 UNITS/HR: 20 INJECTION, SOLUTION INTRAMUSCULAR; SUBCUTANEOUS at 23:05

## 2023-09-25 RX ADMIN — MIDAZOLAM 2 MG: 1 INJECTION INTRAMUSCULAR; INTRAVENOUS at 21:18

## 2023-09-25 RX ADMIN — Medication 200 MCG: at 20:22

## 2023-09-25 RX ADMIN — Medication 100 MCG: at 20:19

## 2023-09-25 RX ADMIN — NOREPINEPHRINE BITARTRATE 0.3 MCG/KG/MIN: 0.02 INJECTION, SOLUTION INTRAVENOUS at 20:41

## 2023-09-25 ASSESSMENT — ACTIVITIES OF DAILY LIVING (ADL)
ADLS_ACUITY_SCORE: 37

## 2023-09-25 NOTE — ED NOTES
Bed: ED10  Expected date:   Expected time:   Means of arrival:   Comments:  Kerri 1 63M paraplegic, fever

## 2023-09-25 NOTE — ED TRIAGE NOTES
"Pt arrives via EMS after caregiver noticed fevers, lower bp \"than normal\", and O2 sats in the mid 80's     Triage Assessment       Row Name 09/25/23 6136       Triage Assessment (Adult)    Airway WDL WDL       Respiratory WDL    Respiratory WDL X;rhythm/pattern    Rhythm/Pattern, Respiratory tachypneic       Skin Circulation/Temperature WDL    Skin Circulation/Temperature WDL WDL       Cardiac WDL    Cardiac WDL WDL       Peripheral/Neurovascular WDL    Peripheral Neurovascular WDL WDL       Cognitive/Neuro/Behavioral WDL    Cognitive/Neuro/Behavioral WDL X    Level of Consciousness lethargic    Orientation other (see comments)  HECTOR                    "

## 2023-09-26 ENCOUNTER — APPOINTMENT (OUTPATIENT)
Dept: CARDIOLOGY | Facility: CLINIC | Age: 63
DRG: 871 | End: 2023-09-26
Attending: INTERNAL MEDICINE
Payer: MEDICARE

## 2023-09-26 ENCOUNTER — TELEPHONE (OUTPATIENT)
Dept: FAMILY MEDICINE | Facility: CLINIC | Age: 63
End: 2023-09-26

## 2023-09-26 DIAGNOSIS — Z53.9 DIAGNOSIS NOT YET DEFINED: Primary | ICD-10-CM

## 2023-09-26 DIAGNOSIS — J18.9 PNEUMONIA OF BOTH LOWER LOBES DUE TO INFECTIOUS ORGANISM: ICD-10-CM

## 2023-09-26 LAB
ALBUMIN SERPL BCG-MCNC: 2.6 G/DL (ref 3.5–5.2)
ALLEN'S TEST: ABNORMAL
ALP SERPL-CCNC: 73 U/L (ref 40–129)
ALT SERPL W P-5'-P-CCNC: 20 U/L (ref 0–70)
ANION GAP SERPL CALCULATED.3IONS-SCNC: 16 MMOL/L (ref 7–15)
ANION GAP SERPL CALCULATED.3IONS-SCNC: 19 MMOL/L (ref 7–15)
AST SERPL W P-5'-P-CCNC: 61 U/L (ref 0–45)
ATRIAL RATE - MUSE: 113 BPM
BASE EXCESS BLDA CALC-SCNC: 5.2 MMOL/L (ref -9–1.8)
BASE EXCESS BLDA CALC-SCNC: 6 MMOL/L (ref -9–1.8)
BASE EXCESS BLDA CALC-SCNC: 6.3 MMOL/L (ref -9–1.8)
BASE EXCESS BLDA CALC-SCNC: 6.5 MMOL/L (ref -9–1.8)
BASOPHILS # BLD AUTO: 0.1 10E3/UL (ref 0–0.2)
BASOPHILS # BLD AUTO: 0.1 10E3/UL (ref 0–0.2)
BASOPHILS NFR BLD AUTO: 0 %
BASOPHILS NFR BLD AUTO: 0 %
BILIRUB SERPL-MCNC: 0.5 MG/DL
BUN SERPL-MCNC: 37.6 MG/DL (ref 8–23)
BUN SERPL-MCNC: 44.5 MG/DL (ref 8–23)
C PNEUM DNA SPEC QL NAA+PROBE: NOT DETECTED
CA-I BLD-MCNC: 3.7 MG/DL (ref 4.4–5.2)
CA-I BLD-MCNC: 4.4 MG/DL (ref 4.4–5.2)
CALCIUM SERPL-MCNC: 7 MG/DL (ref 8.8–10.2)
CALCIUM SERPL-MCNC: 7.8 MG/DL (ref 8.8–10.2)
CHLORIDE SERPL-SCNC: 96 MMOL/L (ref 98–107)
CHLORIDE SERPL-SCNC: 98 MMOL/L (ref 98–107)
CREAT SERPL-MCNC: 1.43 MG/DL (ref 0.67–1.17)
CREAT SERPL-MCNC: 1.64 MG/DL (ref 0.67–1.17)
DEPRECATED HCO3 PLAS-SCNC: 24 MMOL/L (ref 22–29)
DEPRECATED HCO3 PLAS-SCNC: 24 MMOL/L (ref 22–29)
DIASTOLIC BLOOD PRESSURE - MUSE: NORMAL MMHG
EGFRCR SERPLBLD CKD-EPI 2021: 47 ML/MIN/1.73M2
EGFRCR SERPLBLD CKD-EPI 2021: 56 ML/MIN/1.73M2
EOSINOPHIL # BLD AUTO: 0 10E3/UL (ref 0–0.7)
EOSINOPHIL # BLD AUTO: 0.1 10E3/UL (ref 0–0.7)
EOSINOPHIL NFR BLD AUTO: 0 %
EOSINOPHIL NFR BLD AUTO: 1 %
ERYTHROCYTE [DISTWIDTH] IN BLOOD BY AUTOMATED COUNT: 14.5 % (ref 10–15)
ERYTHROCYTE [DISTWIDTH] IN BLOOD BY AUTOMATED COUNT: 14.6 % (ref 10–15)
FLUAV H1 2009 PAND RNA SPEC QL NAA+PROBE: NOT DETECTED
FLUAV H1 RNA SPEC QL NAA+PROBE: NOT DETECTED
FLUAV H3 RNA SPEC QL NAA+PROBE: NOT DETECTED
FLUAV RNA SPEC QL NAA+PROBE: NOT DETECTED
FLUBV RNA SPEC QL NAA+PROBE: NOT DETECTED
GLUCOSE BLDC GLUCOMTR-MCNC: 110 MG/DL (ref 70–99)
GLUCOSE BLDC GLUCOMTR-MCNC: 120 MG/DL (ref 70–99)
GLUCOSE BLDC GLUCOMTR-MCNC: 121 MG/DL (ref 70–99)
GLUCOSE BLDC GLUCOMTR-MCNC: 136 MG/DL (ref 70–99)
GLUCOSE BLDC GLUCOMTR-MCNC: 141 MG/DL (ref 70–99)
GLUCOSE BLDC GLUCOMTR-MCNC: 145 MG/DL (ref 70–99)
GLUCOSE BLDC GLUCOMTR-MCNC: 151 MG/DL (ref 70–99)
GLUCOSE BLDC GLUCOMTR-MCNC: 172 MG/DL (ref 70–99)
GLUCOSE BLDC GLUCOMTR-MCNC: 192 MG/DL (ref 70–99)
GLUCOSE BLDC GLUCOMTR-MCNC: 211 MG/DL (ref 70–99)
GLUCOSE BLDC GLUCOMTR-MCNC: 220 MG/DL (ref 70–99)
GLUCOSE BLDC GLUCOMTR-MCNC: 241 MG/DL (ref 70–99)
GLUCOSE BLDC GLUCOMTR-MCNC: 248 MG/DL (ref 70–99)
GLUCOSE BLDC GLUCOMTR-MCNC: 265 MG/DL (ref 70–99)
GLUCOSE BLDC GLUCOMTR-MCNC: 272 MG/DL (ref 70–99)
GLUCOSE BLDC GLUCOMTR-MCNC: 273 MG/DL (ref 70–99)
GLUCOSE SERPL-MCNC: 233 MG/DL (ref 70–99)
GLUCOSE SERPL-MCNC: 288 MG/DL (ref 70–99)
GRAM STAIN RESULT: NORMAL
GRAM STAIN RESULT: NORMAL
HADV DNA SPEC QL NAA+PROBE: NOT DETECTED
HBA1C MFR BLD: 5.9 %
HCO3 BLD-SCNC: 29 MMOL/L (ref 21–28)
HCO3 BLD-SCNC: 31 MMOL/L (ref 21–28)
HCO3 BLD-SCNC: 32 MMOL/L (ref 21–28)
HCO3 BLD-SCNC: 32 MMOL/L (ref 21–28)
HCOV PNL SPEC NAA+PROBE: NOT DETECTED
HCT VFR BLD AUTO: 43.4 % (ref 40–53)
HCT VFR BLD AUTO: 43.5 % (ref 40–53)
HGB BLD-MCNC: 13.7 G/DL (ref 13.3–17.7)
HGB BLD-MCNC: 14.6 G/DL (ref 13.3–17.7)
HMPV RNA SPEC QL NAA+PROBE: NOT DETECTED
HPIV1 RNA SPEC QL NAA+PROBE: NOT DETECTED
HPIV2 RNA SPEC QL NAA+PROBE: NOT DETECTED
HPIV3 RNA SPEC QL NAA+PROBE: NOT DETECTED
HPIV4 RNA SPEC QL NAA+PROBE: NOT DETECTED
IMM GRANULOCYTES # BLD: 0.1 10E3/UL
IMM GRANULOCYTES # BLD: 0.1 10E3/UL
IMM GRANULOCYTES NFR BLD: 0 %
IMM GRANULOCYTES NFR BLD: 0 %
INTERPRETATION ECG - MUSE: NORMAL
KOH PREPARATION: NORMAL
KOH PREPARATION: NORMAL
L PNEUMO1 AG UR QL IA: NEGATIVE
LACTATE SERPL-SCNC: 1.7 MMOL/L (ref 0.7–2)
LACTATE SERPL-SCNC: 2.1 MMOL/L (ref 0.7–2)
LACTATE SERPL-SCNC: 2.4 MMOL/L (ref 0.7–2)
LACTATE SERPL-SCNC: 4.7 MMOL/L (ref 0.7–2)
LACTATE SERPL-SCNC: 5.7 MMOL/L (ref 0.7–2)
LACTATE SERPL-SCNC: 5.8 MMOL/L (ref 0.7–2)
LVEF ECHO: NORMAL
LYMPHOCYTES # BLD AUTO: 1.8 10E3/UL (ref 0.8–5.3)
LYMPHOCYTES # BLD AUTO: 2.4 10E3/UL (ref 0.8–5.3)
LYMPHOCYTES NFR BLD AUTO: 11 %
LYMPHOCYTES NFR BLD AUTO: 9 %
M PNEUMO DNA SPEC QL NAA+PROBE: NOT DETECTED
MCH RBC QN AUTO: 28.2 PG (ref 26.5–33)
MCH RBC QN AUTO: 29.7 PG (ref 26.5–33)
MCHC RBC AUTO-ENTMCNC: 31.6 G/DL (ref 31.5–36.5)
MCHC RBC AUTO-ENTMCNC: 33.6 G/DL (ref 31.5–36.5)
MCV RBC AUTO: 88 FL (ref 78–100)
MCV RBC AUTO: 90 FL (ref 78–100)
MONOCYTES # BLD AUTO: 0.7 10E3/UL (ref 0–1.3)
MONOCYTES # BLD AUTO: 1 10E3/UL (ref 0–1.3)
MONOCYTES NFR BLD AUTO: 4 %
MONOCYTES NFR BLD AUTO: 5 %
NEUTROPHILS # BLD AUTO: 17.7 10E3/UL (ref 1.6–8.3)
NEUTROPHILS # BLD AUTO: 18 10E3/UL (ref 1.6–8.3)
NEUTROPHILS NFR BLD AUTO: 83 %
NEUTROPHILS NFR BLD AUTO: 87 %
NRBC # BLD AUTO: 0 10E3/UL
NRBC # BLD AUTO: 0 10E3/UL
NRBC BLD AUTO-RTO: 0 /100
NRBC BLD AUTO-RTO: 0 /100
O2/TOTAL GAS SETTING VFR VENT: 30 %
O2/TOTAL GAS SETTING VFR VENT: 30 %
O2/TOTAL GAS SETTING VFR VENT: 40 %
O2/TOTAL GAS SETTING VFR VENT: 80 %
OXYHGB MFR BLD: 99 % (ref 92–100)
P AXIS - MUSE: 59 DEGREES
PCO2 BLD: 34 MM HG (ref 35–45)
PCO2 BLD: 46 MM HG (ref 35–45)
PCO2 BLD: 47 MM HG (ref 35–45)
PCO2 BLD: 48 MM HG (ref 35–45)
PH BLD: 7.42 [PH] (ref 7.35–7.45)
PH BLD: 7.43 [PH] (ref 7.35–7.45)
PH BLD: 7.44 [PH] (ref 7.35–7.45)
PH BLD: 7.53 [PH] (ref 7.35–7.45)
PHOSPHATE SERPL-MCNC: 1.6 MG/DL (ref 2.5–4.5)
PHOSPHATE SERPL-MCNC: 2 MG/DL (ref 2.5–4.5)
PLATELET # BLD AUTO: 189 10E3/UL (ref 150–450)
PLATELET # BLD AUTO: 198 10E3/UL (ref 150–450)
PO2 BLD: 119 MM HG (ref 80–105)
PO2 BLD: 142 MM HG (ref 80–105)
PO2 BLD: 145 MM HG (ref 80–105)
PO2 BLD: 160 MM HG (ref 80–105)
POTASSIUM SERPL-SCNC: 3.5 MMOL/L (ref 3.4–5.3)
POTASSIUM SERPL-SCNC: 3.5 MMOL/L (ref 3.4–5.3)
PR INTERVAL - MUSE: 148 MS
PROT SERPL-MCNC: 5.3 G/DL (ref 6.4–8.3)
QRS DURATION - MUSE: 96 MS
QT - MUSE: 380 MS
QTC - MUSE: 521 MS
R AXIS - MUSE: -67 DEGREES
RBC # BLD AUTO: 4.85 10E6/UL (ref 4.4–5.9)
RBC # BLD AUTO: 4.92 10E6/UL (ref 4.4–5.9)
RSV RNA SPEC QL NAA+PROBE: NOT DETECTED
RSV RNA SPEC QL NAA+PROBE: NOT DETECTED
RV+EV RNA SPEC QL NAA+PROBE: NOT DETECTED
S PNEUM AG SPEC QL: NEGATIVE
SODIUM SERPL-SCNC: 138 MMOL/L (ref 136–145)
SODIUM SERPL-SCNC: 139 MMOL/L (ref 136–145)
SYSTOLIC BLOOD PRESSURE - MUSE: NORMAL MMHG
T AXIS - MUSE: 79 DEGREES
VENTRICULAR RATE- MUSE: 113 BPM
WBC # BLD AUTO: 20.7 10E3/UL (ref 4–11)
WBC # BLD AUTO: 21.4 10E3/UL (ref 4–11)

## 2023-09-26 PROCEDURE — 82805 BLOOD GASES W/O2 SATURATION: CPT

## 2023-09-26 PROCEDURE — 250N000011 HC RX IP 250 OP 636: Performed by: INTERNAL MEDICINE

## 2023-09-26 PROCEDURE — 87899 AGENT NOS ASSAY W/OPTIC: CPT | Performed by: STUDENT IN AN ORGANIZED HEALTH CARE EDUCATION/TRAINING PROGRAM

## 2023-09-26 PROCEDURE — 250N000013 HC RX MED GY IP 250 OP 250 PS 637: Performed by: INTERNAL MEDICINE

## 2023-09-26 PROCEDURE — 84100 ASSAY OF PHOSPHORUS: CPT | Performed by: INTERNAL MEDICINE

## 2023-09-26 PROCEDURE — G0463 HOSPITAL OUTPT CLINIC VISIT: HCPCS | Mod: 25

## 2023-09-26 PROCEDURE — 200N000001 HC R&B ICU

## 2023-09-26 PROCEDURE — 250N000009 HC RX 250: Performed by: STUDENT IN AN ORGANIZED HEALTH CARE EDUCATION/TRAINING PROGRAM

## 2023-09-26 PROCEDURE — 999N000157 HC STATISTIC RCP TIME EA 10 MIN

## 2023-09-26 PROCEDURE — 83036 HEMOGLOBIN GLYCOSYLATED A1C: CPT | Performed by: INTERNAL MEDICINE

## 2023-09-26 PROCEDURE — 87106 FUNGI IDENTIFICATION YEAST: CPT | Performed by: INTERNAL MEDICINE

## 2023-09-26 PROCEDURE — 82803 BLOOD GASES ANY COMBINATION: CPT

## 2023-09-26 PROCEDURE — 82330 ASSAY OF CALCIUM: CPT | Performed by: INTERNAL MEDICINE

## 2023-09-26 PROCEDURE — 0B938ZZ DRAINAGE OF RIGHT MAIN BRONCHUS, VIA NATURAL OR ARTIFICIAL OPENING ENDOSCOPIC: ICD-10-PCS | Performed by: INTERNAL MEDICINE

## 2023-09-26 PROCEDURE — G0180 MD CERTIFICATION HHA PATIENT: HCPCS | Performed by: INTERNAL MEDICINE

## 2023-09-26 PROCEDURE — 85025 COMPLETE CBC W/AUTO DIFF WBC: CPT | Performed by: STUDENT IN AN ORGANIZED HEALTH CARE EDUCATION/TRAINING PROGRAM

## 2023-09-26 PROCEDURE — 87205 SMEAR GRAM STAIN: CPT | Performed by: INTERNAL MEDICINE

## 2023-09-26 PROCEDURE — 93306 TTE W/DOPPLER COMPLETE: CPT | Mod: 26 | Performed by: INTERNAL MEDICINE

## 2023-09-26 PROCEDURE — 250N000009 HC RX 250: Performed by: INTERNAL MEDICINE

## 2023-09-26 PROCEDURE — 82803 BLOOD GASES ANY COMBINATION: CPT | Performed by: INTERNAL MEDICINE

## 2023-09-26 PROCEDURE — 31622 DX BRONCHOSCOPE/WASH: CPT | Performed by: INTERNAL MEDICINE

## 2023-09-26 PROCEDURE — 87210 SMEAR WET MOUNT SALINE/INK: CPT | Performed by: INTERNAL MEDICINE

## 2023-09-26 PROCEDURE — 87102 FUNGUS ISOLATION CULTURE: CPT | Performed by: INTERNAL MEDICINE

## 2023-09-26 PROCEDURE — 255N000002 HC RX 255 OP 636: Performed by: INTERNAL MEDICINE

## 2023-09-26 PROCEDURE — 94003 VENT MGMT INPAT SUBQ DAY: CPT

## 2023-09-26 PROCEDURE — 250N000013 HC RX MED GY IP 250 OP 250 PS 637: Performed by: STUDENT IN AN ORGANIZED HEALTH CARE EDUCATION/TRAINING PROGRAM

## 2023-09-26 PROCEDURE — 250N000012 HC RX MED GY IP 250 OP 636 PS 637: Performed by: STUDENT IN AN ORGANIZED HEALTH CARE EDUCATION/TRAINING PROGRAM

## 2023-09-26 PROCEDURE — 83605 ASSAY OF LACTIC ACID: CPT | Performed by: INTERNAL MEDICINE

## 2023-09-26 PROCEDURE — 258N000003 HC RX IP 258 OP 636: Performed by: STUDENT IN AN ORGANIZED HEALTH CARE EDUCATION/TRAINING PROGRAM

## 2023-09-26 PROCEDURE — 80053 COMPREHEN METABOLIC PANEL: CPT | Performed by: STUDENT IN AN ORGANIZED HEALTH CARE EDUCATION/TRAINING PROGRAM

## 2023-09-26 PROCEDURE — 87116 MYCOBACTERIA CULTURE: CPT | Performed by: INTERNAL MEDICINE

## 2023-09-26 PROCEDURE — 99291 CRITICAL CARE FIRST HOUR: CPT | Mod: 25 | Performed by: INTERNAL MEDICINE

## 2023-09-26 PROCEDURE — 250N000011 HC RX IP 250 OP 636: Mod: JZ | Performed by: STUDENT IN AN ORGANIZED HEALTH CARE EDUCATION/TRAINING PROGRAM

## 2023-09-26 PROCEDURE — 258N000003 HC RX IP 258 OP 636: Performed by: INTERNAL MEDICINE

## 2023-09-26 PROCEDURE — 87633 RESP VIRUS 12-25 TARGETS: CPT | Performed by: INTERNAL MEDICINE

## 2023-09-26 PROCEDURE — 999N000208 ECHOCARDIOGRAM COMPLETE

## 2023-09-26 PROCEDURE — 87077 CULTURE AEROBIC IDENTIFY: CPT | Performed by: INTERNAL MEDICINE

## 2023-09-26 RX ORDER — MULTIVITAMIN,THERAPEUTIC
1 TABLET ORAL DAILY
Status: DISCONTINUED | OUTPATIENT
Start: 2023-09-27 | End: 2023-10-03 | Stop reason: HOSPADM

## 2023-09-26 RX ORDER — FLUDROCORTISONE ACETATE 0.1 MG/1
0.1 TABLET ORAL DAILY
Status: DISCONTINUED | OUTPATIENT
Start: 2023-09-27 | End: 2023-09-28

## 2023-09-26 RX ORDER — CARBAMAZEPINE 100 MG/5ML
100 SUSPENSION ORAL DAILY
Status: DISCONTINUED | OUTPATIENT
Start: 2023-09-26 | End: 2023-10-03 | Stop reason: HOSPADM

## 2023-09-26 RX ORDER — NICOTINE POLACRILEX 4 MG
15-30 LOZENGE BUCCAL
Status: DISCONTINUED | OUTPATIENT
Start: 2023-09-26 | End: 2023-09-26

## 2023-09-26 RX ORDER — POLYETHYLENE GLYCOL 3350 17 G/17G
17 POWDER, FOR SOLUTION ORAL DAILY PRN
Status: DISCONTINUED | OUTPATIENT
Start: 2023-09-26 | End: 2023-10-03 | Stop reason: HOSPADM

## 2023-09-26 RX ORDER — POTASSIUM CHLORIDE 1.5 G/1.58G
20 POWDER, FOR SOLUTION ORAL ONCE
Status: COMPLETED | OUTPATIENT
Start: 2023-09-26 | End: 2023-09-26

## 2023-09-26 RX ORDER — DEXTROSE MONOHYDRATE 25 G/50ML
25-50 INJECTION, SOLUTION INTRAVENOUS
Status: DISCONTINUED | OUTPATIENT
Start: 2023-09-26 | End: 2023-09-26

## 2023-09-26 RX ORDER — POTASSIUM CHLORIDE 20MEQ/15ML
20 LIQUID (ML) ORAL ONCE
Status: COMPLETED | OUTPATIENT
Start: 2023-09-26 | End: 2023-09-26

## 2023-09-26 RX ORDER — DEXTROSE MONOHYDRATE 100 MG/ML
INJECTION, SOLUTION INTRAVENOUS CONTINUOUS PRN
Status: DISCONTINUED | OUTPATIENT
Start: 2023-09-26 | End: 2023-10-03 | Stop reason: HOSPADM

## 2023-09-26 RX ORDER — CALCIUM GLUCONATE 20 MG/ML
2 INJECTION, SOLUTION INTRAVENOUS ONCE
Status: COMPLETED | OUTPATIENT
Start: 2023-09-26 | End: 2023-09-26

## 2023-09-26 RX ORDER — GLYCOPYRROLATE 1 MG/1
1 TABLET ORAL 2 TIMES DAILY PRN
Status: DISCONTINUED | OUTPATIENT
Start: 2023-09-26 | End: 2023-10-03 | Stop reason: HOSPADM

## 2023-09-26 RX ADMIN — CALCIUM GLUCONATE 2 G: 20 INJECTION, SOLUTION INTRAVENOUS at 04:37

## 2023-09-26 RX ADMIN — FLUDROCORTISONE ACETATE 0.1 MG: 0.1 TABLET ORAL at 08:29

## 2023-09-26 RX ADMIN — LEVOTHYROXINE SODIUM 150 MCG: 75 TABLET ORAL at 08:29

## 2023-09-26 RX ADMIN — INSULIN HUMAN 3 UNITS/HR: 1 INJECTION, SOLUTION INTRAVENOUS at 05:56

## 2023-09-26 RX ADMIN — SODIUM CHLORIDE, POTASSIUM CHLORIDE, SODIUM LACTATE AND CALCIUM CHLORIDE 1000 ML: 600; 310; 30; 20 INJECTION, SOLUTION INTRAVENOUS at 00:02

## 2023-09-26 RX ADMIN — Medication 40 MG: at 08:25

## 2023-09-26 RX ADMIN — CARBAMAZEPINE 150 MG: 100 SUSPENSION ORAL at 00:06

## 2023-09-26 RX ADMIN — PIPERACILLIN AND TAZOBACTAM 3.38 G: 3; .375 INJECTION, POWDER, FOR SOLUTION INTRAVENOUS at 08:40

## 2023-09-26 RX ADMIN — HYDROCORTISONE SODIUM SUCCINATE 50 MG: 100 INJECTION, POWDER, FOR SOLUTION INTRAMUSCULAR; INTRAVENOUS at 17:33

## 2023-09-26 RX ADMIN — HYDROCORTISONE SODIUM SUCCINATE 50 MG: 100 INJECTION, POWDER, FOR SOLUTION INTRAMUSCULAR; INTRAVENOUS at 00:09

## 2023-09-26 RX ADMIN — POTASSIUM CHLORIDE 20 MEQ: 20 SOLUTION ORAL at 02:50

## 2023-09-26 RX ADMIN — CARBAMAZEPINE 100 MG: 100 SUSPENSION ORAL at 18:14

## 2023-09-26 RX ADMIN — SODIUM BICARBONATE 650 MG TABLET 650 MG: at 08:25

## 2023-09-26 RX ADMIN — HEPARIN SODIUM 5000 UNITS: 5000 INJECTION, SOLUTION INTRAVENOUS; SUBCUTANEOUS at 00:09

## 2023-09-26 RX ADMIN — POTASSIUM CHLORIDE 20 MEQ: 1.5 POWDER, FOR SOLUTION ORAL at 08:25

## 2023-09-26 RX ADMIN — CARBAMAZEPINE 150 MG: 100 SUSPENSION ORAL at 13:20

## 2023-09-26 RX ADMIN — HYDROCORTISONE SODIUM SUCCINATE 50 MG: 100 INJECTION, POWDER, FOR SOLUTION INTRAMUSCULAR; INTRAVENOUS at 11:14

## 2023-09-26 RX ADMIN — CARBAMAZEPINE 150 MG: 100 SUSPENSION ORAL at 06:10

## 2023-09-26 RX ADMIN — SODIUM PHOSPHATE, MONOBASIC, MONOHYDRATE AND SODIUM PHOSPHATE, DIBASIC, ANHYDROUS 9 MMOL: 142; 276 INJECTION, SOLUTION INTRAVENOUS at 11:51

## 2023-09-26 RX ADMIN — BRIVARACETAM 100 MG: 10 SOLUTION ORAL at 21:11

## 2023-09-26 RX ADMIN — CHLORHEXIDINE GLUCONATE 15 ML: 1.2 SOLUTION ORAL at 00:09

## 2023-09-26 RX ADMIN — SODIUM CHLORIDE, POTASSIUM CHLORIDE, SODIUM LACTATE AND CALCIUM CHLORIDE 1000 ML: 600; 310; 30; 20 INJECTION, SOLUTION INTRAVENOUS at 00:50

## 2023-09-26 RX ADMIN — VASOPRESSIN 2.4 UNITS/HR: 20 INJECTION, SOLUTION INTRAMUSCULAR; SUBCUTANEOUS at 14:02

## 2023-09-26 RX ADMIN — VANCOMYCIN HYDROCHLORIDE 1250 MG: 10 INJECTION, POWDER, LYOPHILIZED, FOR SOLUTION INTRAVENOUS at 19:47

## 2023-09-26 RX ADMIN — SODIUM CHLORIDE, POTASSIUM CHLORIDE, SODIUM LACTATE AND CALCIUM CHLORIDE 1000 ML: 600; 310; 30; 20 INJECTION, SOLUTION INTRAVENOUS at 03:33

## 2023-09-26 RX ADMIN — PIPERACILLIN AND TAZOBACTAM 3.38 G: 3; .375 INJECTION, POWDER, FOR SOLUTION INTRAVENOUS at 02:49

## 2023-09-26 RX ADMIN — HEPARIN SODIUM 5000 UNITS: 5000 INJECTION, SOLUTION INTRAVENOUS; SUBCUTANEOUS at 08:49

## 2023-09-26 RX ADMIN — PIPERACILLIN AND TAZOBACTAM 3.38 G: 3; .375 INJECTION, POWDER, FOR SOLUTION INTRAVENOUS at 14:06

## 2023-09-26 RX ADMIN — BRIVARACETAM 100 MG: 10 SOLUTION ORAL at 09:03

## 2023-09-26 RX ADMIN — HEPARIN SODIUM 5000 UNITS: 5000 INJECTION, SOLUTION INTRAVENOUS; SUBCUTANEOUS at 16:14

## 2023-09-26 RX ADMIN — SODIUM BICARBONATE 650 MG TABLET 650 MG: at 00:25

## 2023-09-26 RX ADMIN — INSULIN ASPART 2 UNITS: 100 INJECTION, SOLUTION INTRAVENOUS; SUBCUTANEOUS at 04:46

## 2023-09-26 RX ADMIN — BRIVARACETAM 100 MG: 10 SOLUTION ORAL at 00:06

## 2023-09-26 RX ADMIN — SODIUM BICARBONATE 650 MG TABLET 650 MG: at 21:11

## 2023-09-26 RX ADMIN — INSULIN ASPART 1 UNITS: 100 INJECTION, SOLUTION INTRAVENOUS; SUBCUTANEOUS at 00:44

## 2023-09-26 RX ADMIN — NOREPINEPHRINE BITARTRATE 0.32 MCG/KG/MIN: 0.06 INJECTION, SOLUTION INTRAVENOUS at 11:56

## 2023-09-26 RX ADMIN — HYDROCORTISONE SODIUM SUCCINATE 50 MG: 100 INJECTION, POWDER, FOR SOLUTION INTRAMUSCULAR; INTRAVENOUS at 22:51

## 2023-09-26 RX ADMIN — CHLORHEXIDINE GLUCONATE 15 ML: 1.2 SOLUTION ORAL at 19:47

## 2023-09-26 RX ADMIN — PROPOFOL 20 MCG/KG/MIN: 10 INJECTION, EMULSION INTRAVENOUS at 06:13

## 2023-09-26 RX ADMIN — VASOPRESSIN 2.4 UNITS/HR: 20 INJECTION, SOLUTION INTRAMUSCULAR; SUBCUTANEOUS at 05:16

## 2023-09-26 RX ADMIN — HYDROCORTISONE SODIUM SUCCINATE 50 MG: 100 INJECTION, POWDER, FOR SOLUTION INTRAMUSCULAR; INTRAVENOUS at 04:46

## 2023-09-26 RX ADMIN — CHLORHEXIDINE GLUCONATE 15 ML: 1.2 SOLUTION ORAL at 08:23

## 2023-09-26 RX ADMIN — INSULIN HUMAN 2 UNITS/HR: 1 INJECTION, SOLUTION INTRAVENOUS at 22:51

## 2023-09-26 RX ADMIN — FLUDROCORTISONE ACETATE 0.1 MG: 0.1 TABLET ORAL at 00:26

## 2023-09-26 RX ADMIN — PIPERACILLIN AND TAZOBACTAM 3.38 G: 3; .375 INJECTION, POWDER, FOR SOLUTION INTRAVENOUS at 19:46

## 2023-09-26 RX ADMIN — MULTIVITAMIN TABLET 1 TABLET: TABLET at 08:25

## 2023-09-26 RX ADMIN — HUMAN ALBUMIN MICROSPHERES AND PERFLUTREN 9 ML: 10; .22 INJECTION, SOLUTION INTRAVENOUS at 09:28

## 2023-09-26 ASSESSMENT — ACTIVITIES OF DAILY LIVING (ADL)
ADLS_ACUITY_SCORE: 49
ADLS_ACUITY_SCORE: 49
DEPENDENT_IADLS:: CLEANING;COOKING;LAUNDRY;SHOPPING;MEAL PREPARATION;MEDICATION MANAGEMENT;MONEY MANAGEMENT;TRANSPORTATION;INCONTINENCE
ADLS_ACUITY_SCORE: 49
ADLS_ACUITY_SCORE: 37
ADLS_ACUITY_SCORE: 49

## 2023-09-26 NOTE — PROGRESS NOTES
Admitted from ED at 2300 with levophed infusing at 0.3 mcg/kg/min of the 4mg concentration, started vasopressin and changed levophed to high concentration. Started propofol for agitation and dilaudid pca. Wrist restraints started as pt reaching for ett. LR bolus x 3 for hypotension and lactic acid. MD ordered vent changes and RN clarified orders as MD changed peep from 7 to 15. MD stated not to change peep from 7 but change Vt to 400. Insulin gtt started for BG>150.  Cultured urine sputum, bronched and sent washings to lab.

## 2023-09-26 NOTE — PROGRESS NOTES
[facility]    Patient is currently receiving home care services from [facility]. The patient is currently receiving RN and PT services.  and home health team have been notified of patient admission. [facility] liaison will continue to follow patient during stay. If appropriate provide orders to resume home care at time of discharge.
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
## 2023-09-26 NOTE — CONSULTS
Care Management Initial Consult    General Information  Assessment completed with: Parents, Mother Savannah  Type of CM/SW Visit: Initial Assessment    Primary Care Provider verified and updated as needed: Yes   Readmission within the last 30 days: unable to assess      Reason for Consult: discharge planning  Advance Care Planning:            Communication Assessment  Patient's communication style: spoken language (English or Bilingual)             Cognitive  Cognitive/Neuro/Behavioral: .WDL except  Level of Consciousness: sedated  Arousal Level: opens eyes spontaneously  Orientation: other (see comments) (HECTOR intubated sedated)  Mood/Behavior: calm  Best Language: 3 - Mute  Speech: endotracheal tube    Living Environment:   People in home: parent(s)  Savannah and UZIEL Ta  Current living Arrangements: house      Able to return to prior arrangements:         Family/Social Support:  Care provided by: other (see comments), parent(s)  Provides care for: no one, unable/limited ability to care for self  Marital Status:   Parent(s), PCA          Description of Support System: Supportive, Involved         Current Resources:   Patient receiving home care services: Yes  Skilled Home Care Services: Skilled Nursing, Physical Therapy  Community Resources: County Programs, County Worker, DME, Housekeeping/Chore Agency, PCA, Home Care  Equipment currently used at home: hospital bed, lift device, wheelchair, manual  Supplies currently used at home: Incontinence Supplies, Enteral Nutrition & Supplies, Wound Care Supplies, Chux, Nutritional Supplements    Employment/Financial:  Employment Status: disabled        Financial Concerns: No concerns identified   Referral to Financial Worker: No       Does the patient's insurance plan have a 3 day qualifying hospital stay waiver?  No    Lifestyle & Psychosocial Needs:  Social Determinants of Health     Food Insecurity: Not on file   Depression: Not at risk (9/20/2023)    PHQ-2      PHQ-2 Score: 0   Housing Stability: Not on file   Tobacco Use: Medium Risk (9/21/2023)    Patient History     Smoking Tobacco Use: Former     Smokeless Tobacco Use: Never     Passive Exposure: Not on file   Financial Resource Strain: Not on file   Alcohol Use: Not on file   Transportation Needs: Not on file   Physical Activity: Not on file   Interpersonal Safety: Not on file   Stress: Not on file   Social Connections: Not on file       Functional Status:  Prior to admission patient needed assistance:   Dependent ADLs:: Bathing, Dressing, Eating, Grooming, Incontinence, Positioning, Transfers, Wheelchair-with assist, Toileting  Dependent IADLs:: Cleaning, Cooking, Laundry, Shopping, Meal Preparation, Medication Management, Money Management, Transportation, Incontinence       Mental Health Status:          Chemical Dependency Status:                Values/Beliefs:  Spiritual, Cultural Beliefs, Gnosticism Practices, Values that affect care: yes (Religious)               Additional Information:  Per consult for discharge planning and chart review indicating pt's Mother Savannah is pt's Guardian, called Savannah.  Per Savannah, patient receives total care from her and pt's live in [facility].  Per Savannah, PCA is with patient during the night and is available during the day when needed.  Savannah shared that she does the tube feeding and medication administration via pt's J-tube and patient has a gastric bag over G-tube.  Savannah shared she is able to use pt's nidia lift with transfers for patient and confirmed pt has manual wheel chair.  Savannah confirmed pt is receiving home care services Skilled RN & PT services with [facility] and receives supplies for tube feedings and chux from [facility].  Per Savannah, pt would need a stretcher ride arranged at discharge.    Inpatient Care Coordinator will continue to follow for discharge planning.  Simona Pierson, RN  Simona Pierson RN, BSN, OCN   Inpatient Care Coordination ICU  [facility]  [facility]  Office: [phone]

## 2023-09-26 NOTE — PLAN OF CARE
Care provided from 7a to 7p    Neuro: nonverbal baseline, PERRL but sluggish, red, scleral edema. Follows commands on L, baseline. Prop has been off since 1500.    CV: Between normal sinus and hector, occasional PAC's. Map >65 maintained with vaso and levo. Pulses are weak, skin is cool and pale.     Resp: vent changes today, satting in the upper 90s. LS clear, no breathing trial today.    GI/: No BM this shift, hypoactive sounds. OG to LIS. TF in jpeg at 15ml/hr, increase at 0200. BG checks are now q2, insulin drip on alg 4.       Lactic down to 2.1 at 1500 today.

## 2023-09-26 NOTE — ED PROVIDER NOTES
History     Chief Complaint:  Fever       The history is provided by the EMS personnel. The history is limited by the condition of the patient.      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  Patient was recently admitted to the hospital at the beginning of September for pneumonia and sepsis. Patient is unable to provide further history given his history of aphasia and critical illness.      Independent Historian:   Mother - They report the patient seemed to be doing well yesterday however today developed worsening somnolence, altered mental status, fever and worsening cough and shortness of breath.  She administered Tylenol to try to get the fever down but it was persistent.    Review of External Notes:  Hospital admission H&P from 9/9/2023-patient was admitted for sepsis due to pneumonia with hypoxic respiratory failure.  Patient has history of MRSA and VRE.    ROS:  See HPI    Allergies:  Valproic Acid  Dilantin [Phenytoin Sodium]  Scopolamine     Physical Exam   Patient Vitals for the past 24 hrs:   BP Temp Temp src Pulse Resp SpO2 Height   09/25/23 2335 (!) 82/55 -- -- 96 19 96 % --   09/25/23 2330 (!) 82/55 -- -- 86 19 96 % --   09/25/23 2307 -- -- -- -- -- (!) 81 % --   09/25/23 2306 -- -- -- -- -- (!) 79 % --   09/25/23 2305 -- -- -- -- -- (!) 81 % --   09/25/23 2155 -- -- -- -- 26 97 % --   09/25/23 2147 -- -- -- -- (!) 9 97 % --   09/25/23 2139 -- -- -- -- 11 94 % --   09/25/23 2136 -- -- -- -- 19 95 % --   09/25/23 2114 -- -- -- -- 19 98 % --   09/25/23 2059 -- -- -- -- -- 95 % --   09/25/23 2046 100/79 -- -- 105 20 90 % --   09/25/23 2033 (!) 122/99 -- -- 101 16 (!) 88 % --   09/25/23 1946 121/82 -- --  "108 (!) 9 95 % --   09/25/23 1931 108/58 -- -- 100 (!) 9 93 % --   09/25/23 1909 94/55 -- -- 105 -- -- --   09/25/23 1833 (!) 83/55 (!) 102  F (38.9  C) Rectal 111 28 -- --   09/25/23 1825 (!) 78/55 -- -- 111 -- -- --   09/25/23 1818 (!) 87/59 (!) 96.6  F (35.9  C) Temporal 111 18 (!) 89 % 1.676 m (5' 6\")        Physical Exam  General: Appears ill, minimally responsive.   Head:  Scalp, face, and head appear normal  Eyes:  Pupils are equal, round, reactive to light     Conjunctivae non-injected and sclerae white  ENT:    The external nose is normal    Pinnae are normal  Neck:  Trachea is in the midline. Prior healed tracheostomy scar.  CV:  Tachycardic rate, regular rhythm      Normal S1/S2, no S3/S4    No murmur or rub. Radial pulses 2+ bilaterally.  Resp:  Lungs are equal bilaterally  + tachypnea    Mild increased work of breathing    Coarse breath sounds throughout. No wheezing, or rhonchi  GI:  Abdomen is soft, no rigidity or guarding    No distension, or mass. GJ-tube present in the LUQ.    No tenderness or rebound tenderness   MS:  Decreased muscle muscle tone    Symmetric motor strength    No lower extremity edema  Skin:  No rash or acute skin lesions noted  Neuro:  Somnolent, minimally responsive. Opens eyes to tactile stimuli. No other purposeful movements. Does not follow commands.   Psych: No agitation.     Emergency Department Course          EKG Interpretation:      Interpreted by Srikanth Ryan MD  Time reviewed: 1912   Symptoms at time of EKG: Dyspnea   Rhythm:  Sinus tachycardia  Rate: 113  Axis: Left Axis Deviation  Ectopy: None  Conduction: Left anterior fasciclar block  ST Segments/ T Waves: Non-specific ST-T wave changes and QTc prolongation 521  Q Waves: None  Clinical Impression: non-specific EKG and sinus tachycardia      Imaging:  XR Chest Port 1 View   Final Result   IMPRESSION: ET tube projects in good position with tip 4 cm above marcella. NG tube tip is within the stomach though sidehole is " likely near level of GE junction.   Clearing of previous infiltrate. Right hemidiaphragm remains substantially elevated. Heart size normal. No new abnormality evident.      CT Chest/Abdomen/Pelvis w Contrast   Final Result   IMPRESSION:   1.  Worsening right lung pneumonia now with right upper lobe involvement.   2.  Abundant frothy debris fills the entire right-sided bronchial tree, this may represent mucous debris though aspirated material not excluded. Inflammatory bronchial wall thickening especially involving right lower lobe.   3.  Stable cyst associated with pancreatic tail.      CT Head w/o Contrast   Final Result   IMPRESSION:   1.  Overall unchanged exam with no new acute intracranial abnormality.      2.  Marked enlargement of the ventricles are similar to previous, with multifocal encephalomalacia with again most pronounced in the left cerebral hemisphere.         Report per radiology    Laboratory:  Labs Ordered and Resulted from Time of ED Arrival to Time of ED Departure   INR - Abnormal       Result Value    INR 1.17 (*)    COMPREHENSIVE METABOLIC PANEL - Abnormal    Sodium 137      Potassium 3.0 (*)     Carbon Dioxide (CO2) 37 (*)     Anion Gap 18 (*)     Urea Nitrogen 55.0 (*)     Creatinine 2.12 (*)     GFR Estimate 35 (*)     Calcium 8.9      Chloride 82 (*)     Glucose 298 (*)     Alkaline Phosphatase 115      AST 85 (*)     ALT 31      Protein Total 7.9      Albumin 3.7      Bilirubin Total 0.5     LIPASE - Abnormal    Lipase 88 (*)    PROCALCITONIN - Abnormal    Procalcitonin 0.23 (*)    TROPONIN T, HIGH SENSITIVITY - Abnormal    Troponin T, High Sensitivity 57 (*)    MAGNESIUM - Abnormal    Magnesium 3.2 (*)    ROUTINE UA WITH MICROSCOPIC REFLEX TO CULTURE - Abnormal    Color Urine Yellow      Appearance Urine Clear      Glucose Urine Negative      Bilirubin Urine Negative      Ketones Urine Negative      Specific Gravity Urine 1.021      Blood Urine Negative      pH Urine 7.0      Protein  Albumin Urine 30 (*)     Urobilinogen Urine 2.0      Nitrite Urine Negative      Leukocyte Esterase Urine Negative      RBC Urine 1      WBC Urine <1      Squamous Epithelials Urine <1      Hyaline Casts Urine 1     CBC WITH PLATELETS AND DIFFERENTIAL - Abnormal    WBC Count 15.7 (*)     RBC Count 5.89      Hemoglobin 17.2      Hematocrit 51.9      MCV 88      MCH 29.2      MCHC 33.1      RDW 14.7      Platelet Count 227     DIFFERENTIAL - Abnormal    % Neutrophils 57      % Lymphocytes 33      % Monocytes 6      % Eosinophils 2      % Basophils 2      Absolute Neutrophils 8.9 (*)     Absolute Lymphocytes 5.2      Absolute Monocytes 0.9      Absolute Eosinophils 0.3      Absolute Basophils 0.3 (*)     RBC Morphology Confirmed RBC Indices      Platelet Assessment        Value: Automated Count Confirmed. Platelet morphology is normal.    Smudge Cells Present (*)    ISTAT GASES LACTATE VENOUS POCT - Abnormal    Lactic Acid POCT 4.0 (*)     Bicarbonate Venous POCT 45 (*)     O2 Sat, Venous POCT 82 (*)     pCO2 Venous POCT 54 (*)     pH Venous POCT 7.53 (*)     pO2 Venous POCT 42     PARTIAL THROMBOPLASTIN TIME - Normal    aPTT 33     INFLUENZA A/B, RSV, & SARS-COV2 PCR - Normal    Influenza A PCR Negative      Influenza B PCR Negative      RSV PCR Negative      SARS CoV2 PCR Negative     TYPE AND SCREEN, ADULT    ABO/RH(D) A POS      Antibody Screen Negative      SPECIMEN EXPIRATION DATE [id number]     BLOOD CULTURE   BLOOD CULTURE   RESPIRATORY AEROBIC BACTERIAL CULTURE   ABO/RH TYPE AND SCREEN        -Intubation    Date/Time: 9/26/2023 8:29 PM    Performed by: Srikanth Ryan MD  Authorized by: Srikanth Ryan MD    Emergent condition/consent implied    Pre-procedure details:     Indications: airway protection and respiratory failure      Patient status:  Unresponsive    Look externally: facial hair      Neck mobility: normal      Pharmacologic strategy: RSI      Induction agents:  Etomidate    Paralytics:   Rocuronium  Procedure details:     Preoxygenation:  Bag valve mask    CPR in progress: no      Number of attempts:  2 (Initial attempt with 7.0 endotracheal tube would not pass through the patient's subglottic stenosis.  Therefore bougie was placed through the tube the tube was removed and a 6.5 endotracheal tube was then able to be passed into the distal trachea.)  Successful intubation attempt details:     Intubation method:  Oral    Intubation technique: video assisted      Laryngoscope blade:  Mac 3    Bougie used: yes      Grade view: I      Tube size (mm): 6.5.    Tube type:  Cuffed    Tube visualized through cords: yes    First unsuccessful intubation attempt details:     Intubation method:  Oral    Intubation technique:  Video assisted    Laryngoscope blade:  Mac 3    Bougie used: no      Grade view: I      Tube size (mm):  7.0    Tube type:  Cuffed    Ventilation between 1st and 2nd attempt: yes with mask    Placement assessment:     ETT at teeth/gumline (cm):  23    Tube secured with:  ETT lundberg    Breath sounds:  Equal    Placement verification: chest rise, colorimetric ETCO2, CXR verification and waveform ETCO2      CXR findings:  Appropriate position  Post-procedure details:     Procedure completion:  Patient tolerated the procedure well with no immediate complications    PROCEDURE    Patient Tolerance:  Patient tolerated the procedure well with no immediate complications  [facility]    -[facility]    Date/Time: 9/25/2023 9:00 PM    Performed by: Srikanth Ryan MD  Authorized by: Srikanth Ryan MD    Emergent condition/consent implied      PRE-PROCEDURE DETAILS:     Hand hygiene: Hand hygiene performed prior to insertion      Sterile barrier technique: All elements of maximal sterile technique followed      Skin preparation:  2% chlorhexidine    Skin preparation agent: Skin preparation agent completely dried prior to procedure      PROCEDURE DETAILS:     Location:  L  femoral    Patient position:  Flat    Procedural supplies:  Triple lumen    Ultrasound guidance: yes      Sterile ultrasound techniques: Sterile gel and sterile probe covers were used      Number of attempts:  1    Successful placement: yes      POST PROCEDURE DETAILS:      Post-procedure:  Dressing applied and line sutured    Assessment:  Blood return through all ports    PROCEDURE    Patient Tolerance:  Patient tolerated the procedure well with no immediate complications  [facility]    -Arterial Line    Date/Time: 9/25/2023 9:05 PM    Performed by: Srikanth Ryan MD  Authorized by: Srikanth Ryan MD    Emergent condition/consent implied      INDICATIONS:   Indications: hemodynamic monitoring and multiple ABGs      PRE-PROCEDURE DETAILS:   Skin preparation:  2% Chlorhexidine  PROCEDURE DETAILS:    Location:  L femoral  Placement technique:  Seldinger and ultrasound guided  Number of attempts:  1  Transducer: waveform confirmed      POST PROCEDURE DETAILS:    Post-procedure:  Biopatch applied, sutured and sterile dressing applied  CMS:  Normal      PROCEDURE    Patient Tolerance:  Patient tolerated the procedure well with no immediate complications       Emergency Department Course & Assessments:             Interventions:  Medications   vasopressin 0.2 units/mL in NS (PITRESSIN) standard conc infusion (2.4 Units/hr Intravenous $New Bag 9/25/23 2305)   carBAMazepine (TEGretol) suspension 150 mg (150 mg Oral or Feeding Tube $Given 9/26/23 0006)   carBAMazepine (TEGretol) suspension 100 mg (has no administration in time range)   glycopyrrolate (ROBINUL) tablet 1 mg (has no administration in time range)   guaiFENesin (ROBITUSSIN) 20 mg/mL solution 300 mg (has no administration in time range)   levothyroxine (SYNTHROID/LEVOTHROID) tablet 150 mcg (has no administration in time range)   multivitamin, therapeutic (THERA-VIT) tablet 1 tablet (has no administration in time range)   sodium  bicarbonate tablet 650 mg (650 mg Oral or Feeding Tube $Given 9/26/23 0025)   glucose gel 15-30 g (has no administration in time range)     Or   dextrose 50 % injection 25-50 mL (has no administration in time range)     Or   glucagon injection 1 mg (has no administration in time range)   heparin ANTICOAGULANT injection 5,000 Units (5,000 Units Subcutaneous $Given 9/26/23 0009)   acetaminophen (TYLENOL) tablet 650 mg (has no administration in time range)     Or   acetaminophen (TYLENOL) solution 650 mg (has no administration in time range)   acetaminophen (TYLENOL) Suppository 650 mg (has no administration in time range)   hydrocortisone sodium succinate PF (solu-CORTEF) injection 50 mg (50 mg Intravenous $Given 9/26/23 0009)   fludrocortisone (FLORINEF) tablet 0.1 mg (0.1 mg Oral $Given 9/26/23 0026)   bisacodyl (DULCOLAX) EC tablet 5 mg (has no administration in time range)     Or   bisacodyl (DULCOLAX) EC tablet 10 mg (has no administration in time range)     Or   bisacodyl (DULCOLAX) EC tablet 15 mg (has no administration in time range)   polyethylene glycol (MIRALAX) Packet 17 g (has no administration in time range)   pantoprazole (PROTONIX) 2 mg/mL suspension 40 mg (has no administration in time range)     Or   pantoprazole (PROTONIX) IV push injection 40 mg (has no administration in time range)   chlorhexidine (PERIDEX) 0.12 % solution 15 mL (15 mLs Mouth/Throat $Given 9/26/23 0009)   albuterol (PROVENTIL HFA/VENTOLIN HFA) inhaler (has no administration in time range)   acetylcysteine (MUCOMYST) 20 % nebulizer solution 1 mL (has no administration in time range)   HYDROmorphone (DILAUDID) 0.2 mg/mL infusion ADULT/PEDS GREATER than or EQUAL to 20 kg (0.3 mg/hr Intravenous $New Bag 9/25/23 2349)   hydromorphone (DILAUDID) 0.2 mg/mL bolus dose from infusion pump 0.5-1 mg (has no administration in time range)   piperacillin-tazobactam (ZOSYN) 3.375 g vial to attach to  mL bag (has no administration in time  range)   insulin aspart (NovoLOG) injection (RAPID ACTING) (has no administration in time range)   propofol (DIPRIVAN) infusion (20 mcg/kg/min × 77.1 kg Intravenous $New Bag 9/25/23 2304)     And   propofol (DIPRIVAN) bolus from infusion pump 10 mg (has no administration in time range)     And   Medication Instruction (has no administration in time range)   naloxone (NARCAN) injection 0.2 mg (has no administration in time range)     Or   naloxone (NARCAN) injection 0.4 mg (has no administration in time range)     Or   naloxone (NARCAN) injection 0.2 mg (has no administration in time range)     Or   naloxone (NARCAN) injection 0.4 mg (has no administration in time range)   norepinephrine (LEVOPHED) 16 mg in  mL infusion MAX CONC CENTRAL LINE (0.3 mcg/kg/min × 77.1 kg Intravenous Rate/Dose Change 9/26/23 0002)   Brivaracetam (BRIVIACT) solution 100 mg (100 mg Oral or Feeding Tube $Given 9/26/23 0006)   vancomycin place lundberg - receiving intermittent dosing (has no administration in time range)   sodium chloride 0.9% BOLUS 1,000 mL (0 mLs Intravenous Stopped 9/25/23 2152)   sodium chloride 0.9% BOLUS 1,000 mL (0 mLs Intravenous Stopped 9/25/23 1951)   piperacillin-tazobactam (ZOSYN) 4.5 g vial to attach to  mL bag (0 g Intravenous Stopped 9/25/23 2011)   vancomycin (VANCOCIN) 1,750 mg in 0.9% NaCl 500 mL intermittent infusion (1,750 mg Intravenous $Given 9/25/23 2050)   acetaminophen (TYLENOL) Suppository 650 mg (650 mg Rectal $Given 9/25/23 1938)   iopamidol (ISOVUE-370) solution 85 mL (85 mLs Intravenous $Given 9/25/23 1918)   Saline Flush (66 mLs Intravenous $Given 9/25/23 1918)   phenylephrine (EUSEBIA-SYNEPHRINE) 100 mcg/mL injection (100 mcg  $Given 9/25/23 2010)   phenylephrine (EUSEBIA-SYNEPHRINE) injection 100 mcg (100 mcg Intravenous $Given 9/25/23 2016)   phenylephrine (EUSEBIA-SYNEPHRINE) injection 100 mcg (100 mcg Intravenous $Given 9/25/23 2019)   phenylephrine (EUSEBIA-SYNEPHRINE) injection 200 mcg (200 mcg  Intravenous $Given 9/25/23 2022)   etomidate (AMIDATE) injection 23 mg (23 mg Intravenous $Given 9/25/23 2029)   rocuronium injection 80 mg (80 mg Intravenous $Given 9/25/23 2030)   potassium chloride 10 mEq in 100 mL sterile water infusion (10 mEq Intravenous $New Bag 9/25/23 2136)   lactated ringers BOLUS 1,000 mL (1,000 mLs Intravenous $New Bag 9/26/23 0002)        Assessments, Independent Interpretation, Consult/Discussion of ManagementTests:  ED Course as of 09/26/23 0038   Mon Sep 25, 2023   1830 Patient seen and evaluated   1929 Patient rechecked.  Systolic    2010 Called to the bedside by the nurse due to an acute change in perfusion, hypotension and increasing respiratory distress.  Patient was found to be profoundly hypotensive.  Bolus doses of phenylephrine were given and the patient was intubated and started on norepinephrine.  Central line and arterial line were placed as noted above.   2055 Case was discussed with Dr. Santana, intensivist who accepts the patient for admission.   2107 The patient's mother was updated on the patient's ED course, findings and plan of care.       Social Determinants of Health affecting care:  None    Disposition:  The patient was admitted to the hospital under the care of Dr. Santana.     Impression & Plan      Medical Decision Making:  Keyon Farias is a 63 year old male who presents to the ED for evaluation of altered mental status, fever and dyspnea with hypoxia and respiratory distress.  On my evaluation the patient appeared very ill.  Oxygen supplementation was provided initially via oxy mask.  Patient's blood pressure initially responded to 2 L of IV crystalloid.  Patient febrile in the emergency department.  Clinical picture is consistent with septic shock.  Initial lactic acid 4.0.  Broad-spectrum antibiotics with Zosyn and vancomycin were initiated.  Blood cultures and urine culture sent.  Urinalysis negative for infection.  Laboratory studies otherwise  reveal hypokalemia with potassium of 3.0.  Acute renal insufficiency with elevated creatinine and BUN.  Glucose 298.  High-sensitivity troponin minimally elevated at 57 likely demand ischemia related to septic shock.  EKG with sinus tachycardia and no evidence of acute ischemia or dysrhythmia.  Initial VBG with mild hypercapnia but alkalotic pH of 7.53.  CBC with leukocytosis white blood cell count of 15.7.  No anemia. COVID/Influenza/RSV testing negative.  CT of the chest abdomen and pelvis demonstrates worsening right-sided pneumonia and  abundant debris completely fills right sided pulmonary bronchial structures extending into the trachea consistent with mucus debris.  Patient has a history of recurrent aspiration pneumonia.  CT of the head negative for any acute intracranial pathology that would explain his altered mental status.  Altered mental status due to septic encephalopathy.  Shortly after CT scan the patient's respiratory and hemodynamic status worsened he became more hypoxic with worsening perfusion.  Further resuscitation with a third liter of IV fluids was given as well as patient was started on norepinephrine and additional push doses of phenylephrine was given.  Patient was intubated as noted above.  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  Smaller than normal endotracheal tube (6.5) was used to pass through the subglottic tracheal stenosis.  Patient's hemodynamics and respiratory status stabilized although he remains critically ill.  The case was discussed with the intensivist and the patient was admitted to the ICU in critical condition.  Patient may benefit from bronchoscopy to clear the debris from the right bronchi and allow for better aeration of the right lung.    Critical Care Time:   Upon my evaluation, this patient had a critical illness with high probability of imminent or life-threatening deterioration due to septic  "shock, hypoxic respiratory failure and septic encephalopathy, which required my direct attention, intervention, and personal management.    I have personally provided 65 minutes of critical care time exclusive of time spent on separately billable procedures. Time includes review of laboratory data, radiology results, discussion with consultants, reassessment of the patient and monitoring for potential decompensation. Interventions were performed as documented above.     Diagnosis:    ICD-10-CM    1. Septic shock (H)  A41.9     R65.21       2. Recurrent pneumonia  J18.9            The patient has signs of Septic Shock  The patient has signs of septic shock as evidenced by:  1. Presence of Sepsis, AND  2. Lactic Acidosis with value greater than or equal to 4 and Persistent hypotension defined by the last 2 BP readings within the ONE HOUR following completion of the 30mL/kg bolus being low (SBP <90 or MAP <65)    Time septic shock diagnosis confirmed = 1900 09/26/23   as this was the time when Lactate was resulted and the level was greater than or equal to 4    3 Hour Septic Shock Bundle Completion:  1. Initial Lactic Acid Result:   Recent Labs   Lab Test 09/25/23  2303 09/25/23  1845 09/09/23  1321   LACT 5.4* 4.0* 2.0     2. Blood Cultures before Antibiotics: Yes  3. Broad Spectrum Antibiotics Administered:  yes       Anti-infectives (From admission through now)      Start     Dose/Rate Route Frequency Ordered Stop    09/25/23 1900  vancomycin (VANCOCIN) 1,750 mg in 0.9% NaCl 500 mL intermittent infusion         1,750 mg  over 2 Hours Intravenous ONCE 09/25/23 1853 09/25/23 2250    09/25/23 1840  piperacillin-tazobactam (ZOSYN) 4.5 g vial to attach to  mL bag        Note to Pharmacy: For [facility], [facility] and [facility]: For Zosyn-naive patients, use the \"Zosyn initial dose + extended infusion\" order panel.    4.5 g  over 30 Minutes Intravenous ONCE 09/25/23 1839 09/25/23 2011            4. IF 30 mL/kg bolus criteria met " based on:  -Lactate > 4  OR  -Initial Hypotension:  Definition:  2 low BP readings (SBP <90, MAP <65, or decrease > 40 from baseline due to infection) within 3 hrs of each other during the time period of 6 hrs before and 3 hrs  after time zero  THEN: Fluid volume administered in ED:  Full 30 mL/kg bolus given (see amount below). Total of 3000mL given.    BMI Readings from Last 1 Encounters:   09/25/23 27.44 kg/m      30 mL/kg fluids based on weight: 2,310 mL  30 mL/kg fluids based on IBW (must be >= 60 inches tall): 1,910 mL    Septic Shock reassessment:  1. Repeat Lactic Acid Level: 5.4  2. Vasopressors started for Persistent Hypotension defined by the last 2 BP readings within the ONE HOUR following completion of the 30mL/kg bolus being low (SBP <90 or MAP <65).    I attest to having performed a repeat sepsis exam and assessment of perfusion at 2205 and the results demonstrate improved perfusion.          Historical Data:  ______________________________________________________________________  Medications:    No current outpatient medications on file.      Past Medical History:   Past Surgical History:     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)     DVT of upper extremity (deep vein thrombosis) (H)     Gastro-oesophageal reflux disease     Panhypopituitarism (H)     Pneumonia     Seizures (H)     Sepsis due to urinary tract infection (H) 01/15/2021    Septic shock (H)     Spastic hemiplegia affecting dominant side (H)     Thyroid disease     Tracheostomy care (H)     Traumatic brain injury (H) 1989    Unspecified cerebral artery occlusion with cerebral infarction 1989    UTI (urinary tract infection)     Ventricular fibrillation (H)     Ventricular tachyarrhythmia (H)     Past Surgical History:   Procedure Laterality Date    ENDOSCOPIC ULTRASOUND UPPER GASTROINTESTINAL TRACT (GI) N/A 1/30/2017    Procedure: ENDOSCOPIC ULTRASOUND, ESOPHAGOSCOPY / UPPER  GASTROINTESTINAL TRACT (GI);  Surgeon: Jus Montana MD;  Location: [facility] OR    ENDOSCOPIC ULTRASOUND, ESOPHAGOSCOPY, GASTROSCOPY, DUODENOSCOPY (EGD), NECROSECTOMY N/A 2/7/2017    Procedure: ENDOSCOPIC ULTRASOUND, ESOPHAGOSCOPY, GASTROSCOPY, DUODENOSCOPY (EGD), NECROSECTOMY;  Surgeon: Jack Marcus MD;  Location: [facility] OR    ESOPHAGOSCOPY, GASTROSCOPY, DUODENOSCOPY (EGD), COMBINED  3/13/2014    Procedure: COMBINED ESOPHAGOSCOPY, GASTROSCOPY, DUODENOSCOPY (EGD), BIOPSY SINGLE OR MULTIPLE;  gastroscopy;  Surgeon: Digna Rhodes MD;  Location:  GI    ESOPHAGOSCOPY, GASTROSCOPY, DUODENOSCOPY (EGD), COMBINED N/A 12/6/2016    Procedure: COMBINED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

## 2023-09-26 NOTE — PHARMACY-VANCOMYCIN DOSING SERVICE
"Pharmacy Vancomycin Note  Date of Service 2023  Patient's  1960  63 year old, male    Indication: Aspiration Pneumonia    Current estimated CrCl = Estimated Creatinine Clearance: 54 mL/min (A) (based on SCr of 1.43 mg/dL (H)).    Creatinine for last 3 days  2023:  6:40 PM Creatinine 2.12 mg/dL  2023: 12:43 AM Creatinine 1.64 mg/dL;  5:42 AM Creatinine 1.43 mg/dL    Recent Vancomycin Level(s) for last 3 days  No results found for requested labs within last 3 days.      Vancomycin IV Administrations (past 72 hours)                     vancomycin (VANCOCIN) 1,750 mg in 0.9% NaCl 500 mL intermittent infusion (mg) 1,750 mg Given 23                    Nephrotoxins and other renal medications (From now, onward)      Start     Dose/Rate Route Frequency Ordered Stop    23  vancomycin (VANCOCIN) 1,250 mg in 0.9% NaCl 250 mL intermittent infusion         1,250 mg  over 90 Minutes Intravenous EVERY 24 HOURS 23 0833      23 0200  piperacillin-tazobactam (ZOSYN) 3.375 g vial to attach to  mL bag        Note to Pharmacy: For [facility], [facility] and [facility]: For Zosyn-naive patients, use the \"Zosyn initial dose + extended infusion\" order panel.    3.375 g  over 30 Minutes Intravenous EVERY 6 HOURS 23 230  norepinephrine (LEVOPHED) 16 mg in  mL infusion MAX CONC CENTRAL LINE         0.01-0.6 mcg/kg/min × 77.1 kg  0.7-43.4 mL/hr  Intravenous CONTINUOUS 237      23  vasopressin 0.2 units/mL in NS (PITRESSIN) standard conc infusion         2.4 Units/hr  12 mL/hr  Intravenous CONTINUOUS 23 2019              Contrast Orders - past 72 hours (72h ago, onward)      Start     Dose/Rate Route Frequency Stop    23  iopamidol (ISOVUE-370) solution 85 mL         85 mL Intravenous ONCE 23            Ins[employer] Prediction of Planned Initial Vancomycin Regimen  Received 1750mg IV X1 last night  Regimen: 1250 mg IV " every 24 hours.  Start time: 2000 on 09/26/2023  Exposure target: AUC24 (range)400-600 mg/L.hr   AUC24,ss: 517 mg/L.hr  Probability of AUC24 > 400: 78 %  Ctrough,ss: 15.4 mg/L  Probability of Ctrough,ss > 20: 27 %  Probability of nephrotoxicity (Lodise ERNESTO 2009): 11 %          Plan:  Start vancomycin  1250 mg IV q24h.   Vancomycin monitoring method:switch to AUC monitoring with improvement in SCr  Vancomycin therapeutic monitoring goal: 400-600 mg*h/L  Pharmacy will check vancomycin levels as appropriate in 1-3 Days.    Serum creatinine levels will be ordered daily for the first week of therapy and at least twice weekly for subsequent weeks.      Elen aWlter RP

## 2023-09-26 NOTE — CONSULTS
[facility] Nurse Inpatient Assessment     Consulted for:  Coccyx/ buttocks    Summary:    Linear DTPI (deep tissue pressure injury) noted across buttocks/coccyx area, unclear history/timeline.   IAD (incontinence-associated dermatitis) to perineal area, possible fungal component.   Heels reddened but intact and blanchable, no pressure injury noted.     Patient History (according to provider note(s):      Keyon Farias is a 63 year old year old male with PMH of TBI with aphasia, aspiration history, recent admission for pneumonia with sepsis treated with IV abx (9/9) now admitted on 9/25/2023 with fever, low blood pressure, with O2 sats in the mid 80s with concern for sepsis 2/2 suspected aspiration pneumonia.      Areas Assessed:      Areas visualized during today's visit: Perineal area, Skin folds , and Sacrum/coccyx    Wound location: Buttocks, coccyx    Last photo: 9-26-23      Wound due to: Pressure Injury  Wound history/plan of care: unclear; pt total cares, non-verbal, currently intubated in ICU  Wound base: linear arc from right outer buttock across coccyx to inner left buttock, outer aspect red non-blanching intact and inner aspects purple boggy tissue that is slightly open in areas     Palpation of the wound bed: normal and boggy      Drainage: scant     Description of drainage: serosanguinous     Measurements (length x width x depth, in cm): approx 14cm long total arc with inner purple aspects that are approx 1 x 3 x 0+cm right and 0.5 x 1.5 x 0.1cm left superior and 0.8 x 2 x 0+cm left inferior      Tunneling: N/A     Undermining: N/A  Periwound skin: Denuded and Erythema- blanchable; coccyx crease near perianal is moist red denudement      Color: pink      Temperature: normal   Odor: none  Pain: no grimacing or signs of discomfort   Pain interventions prior to dressing change: slow and gentle cares   Treatment goal: Heal  and Protection  STATUS: initial assessment and  evolving  Supplies ordered: supplies stored on unit      Wound location: Buttock folds    Last photo: 9-26-23      Wound due to: Friction and Moisture Associated Skin Damage (MASD) and possible pressure  Wound history/plan of care: present on this admission  Wound base: pink/red semi-moist dermis and some resurfacing pink tissue     Palpation of the wound bed: normal      Drainage: none     Description of drainage: none     Measurements (length x width x depth, in cm): approx 0.8 x 0.8 x 0cm right and smaller linear area left     Tunneling: N/A     Undermining: N/A  Periwound skin: Erythema- blanchable; groin folds and inner thighs moist pink macerated with faint satellite lesions      Color: pink      Temperature: normal   Odor: none  Pain: no grimacing or signs of discomfort   Pain interventions prior to dressing change: slow and gentle cares   Treatment goal: Heal , Decrease moisture, and Protection  STATUS: initial assessment  Supplies ordered: supplies stored on unit       Treatment Plan:     Coccyx wound(s): Every other day and prn:  Cleanse with wound cleanser.  If area appears rashy, apply light dusting of antifungal powder.  Appy skin barrier film to periwound (over any powder), let dry.  Apply Mepilex Sacral to upper coccyx/sacrum.  Do not try to cover lower aspects of wounds near perianal, since if cannot keep a good seal will cause further moisture issues.    If wounds open up near perianal area, apply barrier paste or Triad paste (# [id number]) to wounds BID and prn.  PIP measures.     Perineal cares: BID and prn:  Cleanse groin folds and memo area with Rita perineal lotion and soft dry wipes.  Spray the Rita directly on the skin.  Apply antifungal powder to skin folds and all rashy areas.  Apply thin layer of barrier paste to perianal area prn if loose stools, and to any superficial wounds prn.     Pressure Injury Prevention (PIP) Plan:  -Mattress: low air loss  -HOB: Maintain at or below 30 degrees,  unless contraindicated  -Repositioning in bed: Every 1-2 hours , Left/right positioning; avoid supine, and Raise foot of bed prior to raising head of bed, to reduce patient sliding down (shear)  -Heels: Keep elevated off mattress, Pillows under calves, and Heel lift boots prn (# [id number])  -Protective Dressing: Sacral Mepilex for prevention (#[id number]),  especially for the agitated patient   -Positioning Equipment: TAPS wedges (#[id number]) to help maintain 30 degree side lying position   -Moisture Management: Perineal cleansing /protection: Follow Incontinence Protocol, Avoid brief in bed, and Clean and dry skin folds with bathing   -Under Devices: Inspect skin under all medical devices during skin inspection , Ensure tubes are stabilized without tension, and Ensure patient is not lying on medical devices or equipment when repositioned        Orders: Written    RECOMMEND PRIMARY TEAM ORDER: Antifungal powder  Education provided: plan of care  Discussed plan of care with: Nurse  WOC nurse follow-up plan: weekly and prn  Notify WOC if wound(s) deteriorate.  Nursing to notify the Provider(s) and re-consult the WOC Nurse if new skin concern.    DATA:     Current support surface: Standard  Low air loss (ZULY pump, Isolibrium, Pulsate, skin guard, etc) (Isolibrium in ICU)  Containment of urine/stool: Incontinence Protocol and Indwelling catheter  BMI: Body mass index is 25.05 kg/m .   Active diet order: Orders Placed This Encounter      NPO for Medical/Clinical Reasons Except for: Meds     Output: I/O last 3 completed shifts:  In: 652.53 [I.V.:532.53; NG/GT:120]  Out: 625 [Urine:625]     Labs:   Recent Labs   Lab 09/26/23  0859 09/26/23  0542 09/26/23  0304 09/25/23  1840   ALBUMIN  --  2.6*  --  3.7   HGB 13.7  --    < > 17.2   INR  --   --   --  1.17*   WBC 20.7*  --    < > 15.7*   A1C  --  5.9*  --   --     < > = values in this interval not displayed.     Pressure injury risk assessment:   Sensory Perception: 2-->very  limited  Moisture: 3-->occasionally moist  Activity: 1-->bedfast  Mobility: 2-->very limited  Nutrition: 2-->probably inadequate  Friction and Shear: 1-->problem  Ricci Score: 11    Kristie Norris RN CWOCN  -Securely message with [employer] ([facility] [employer])   -[facility] Office Phone: [phone] (messages checked periodically Mon-Fri 8a-4p)
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
## 2023-09-26 NOTE — PHARMACY-VANCOMYCIN DOSING SERVICE
"Pharmacy Vancomycin Initial Note  Date of Service 2023  Patient's  1960  63 year old, male    Indication: Healthcare-Associated Pneumonia    Current estimated CrCl = Estimated Creatinine Clearance: 35.8 mL/min (A) (based on SCr of 2.12 mg/dL (H)).    Creatinine for last 3 days  2023:  6:40 PM Creatinine 2.12 mg/dL    Recent Vancomycin Level(s) for last 3 days  No results found for requested labs within last 3 days.      Vancomycin IV Administrations (past 72 hours)                     vancomycin (VANCOCIN) 1,750 mg in 0.9% NaCl 500 mL intermittent infusion (mg) 1,750 mg Given 23                    Nephrotoxins and other renal medications (From now, onward)      Start     Dose/Rate Route Frequency Ordered Stop    23  piperacillin-tazobactam (ZOSYN) 3.375 g vial to attach to  mL bag        Note to Pharmacy: For [facility], [facility] and [facility]: For Zosyn-naive patients, use the \"Zosyn initial dose + extended infusion\" order panel.    3.375 g  over 30 Minutes Intravenous EVERY 6 HOURS 23  vancomycin place lundberg - receiving intermittent dosing         1 each Intravenous SEE ADMIN INSTRUCTIONS 23  norepinephrine (LEVOPHED) 16 mg in  mL infusion MAX CONC CENTRAL LINE         0.01-0.6 mcg/kg/min × 77.1 kg  0.7-43.4 mL/hr  Intravenous CONTINUOUS 23  vasopressin 0.2 units/mL in NS (PITRESSIN) standard conc infusion         2.4 Units/hr  12 mL/hr  Intravenous CONTINUOUS 23 2019              Contrast Orders - past 72 hours (72h ago, onward)      Start     Dose/Rate Route Frequency Stop    23  iopamidol (ISOVUE-370) solution 85 mL         85 mL Intravenous ONCE 23                Plan:  Will dose vancomycin intermittently based on levels   Vancomycin monitoring method: Trough (Method 1 = dosing nomogram)  Vancomycin therapeutic monitoring goal: 15-20 mg/L  Pharmacy " will check vancomycin levels as appropriate in 1-3 Days.    Serum creatinine levels will be ordered daily for the first week of therapy and at least twice weekly for subsequent weeks.      Stephany Mckay RPH

## 2023-09-26 NOTE — PROGRESS NOTES
[facility] ICU RESPIRATORY NOTE        Date of Admission: 9/25/2023    Date of Intubation (most recent): 9/25/23    Reason for Mechanical Ventilation: Resp failure    Number of Days on Mechanical Ventilation: 2    Met Criteria for Spontaneous Breathing Trial: No    Reason for No Spontaneous Breathing Trial: Per MD    Significant Events Today: None    ABG Results:   Recent Labs   Lab 09/26/23  1516 09/26/23  1211 09/26/23  0300 09/25/23  2319   PH 7.44 7.42 7.53* 7.52*   PCO2 46* 48* 34* 35   PO2 142* 160* 145* 247*   HCO3 32* 31* 29* 29*   O2PER 30 40 80 100         Current Vent Settings: Vent Mode: CMV/AC  (Continuous Mandatory Ventilation/ Assist Control)  FiO2 (%): 50 %  Resp Rate (Set): 16 breaths/min  Tidal Volume (Set, mL): 400 mL  PEEP (cm H2O): 5 cmH2O  Resp: 12      Skin Assessment: Intact    Plan: Pt to remain on full vent support overnight    Red Brown, RT
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
## 2023-09-26 NOTE — PLAN OF CARE
Physical Therapy: Orders received. Chart reviewed and discussed with RN.? Acute Physical Therapy not indicated at this time as pt is intubated and sedated. Of note this pt is dependent with functional mobility, ADLs and IADLs at baseline. Notes indicate the pt has received home cares including PT/OT/SLP/RN and PCA services. Patient lives with his mother in a handicap accessible home. Pt would benefit from transferring up to chair (pt's own wc or a combilizer chair) with assist from nursing using the ceiling lift and universal sling for general strength, PI prevention, air way clearance, once he is medically appropriate for OOB. PT order completed at this time.

## 2023-09-26 NOTE — TELEPHONE ENCOUNTER
Patients mother called wondering about medications. Patient currently in inpatient and nurse encouraged to speak to the hospitalist in charge of his case and to bring up the meds with them.     NANCI Sexton  [facility]

## 2023-09-26 NOTE — PHARMACY-ADMISSION MEDICATION HISTORY
Pharmacist Admission Medication History    Admission medication history is complete. The information provided in this note is only as accurate as the sources available at the time of the update.    Medication reconciliation/reorder completed by provider prior to medication history? No    Information Source(s): Family member, Clinic records, and CareEverywhere/SureScripts via phone    Pertinent Information: Per chart review - levothyroxine dose has been changed recently. Most recent change was levothyroxine 150 mg daily six days a week, 225 mcg one day per week. Patient's mother notes that she has been giving him 137 mcg daily.     Changes made to PTA medication list:  Added: None  Deleted: None  Changed: None    Allergies reviewed with patient and updates made in EHR: yes    Medication History Completed By: Stephany Mckay RPH 9/25/2023 8:41 PM    Prior to Admission medications    Medication Sig Last Dose Taking? Auth Provider Long Term End Date   acetaminophen (TYLENOL) 500 MG tablet Take 500-750 mg by mouth every 8 hours as needed for mild pain  at prn Yes Unknown, Entered By History     albuterol (PROVENTIL) (5 MG/ML) 0.5% neb solution Take 0.5 mLs (2.5 mg) by nebulization every 6 hours as needed for shortness of breath, wheezing or cough 0700 1100 1500 1900 with mucomyst  at prn Yes Edita Pope, DO Yes    azelastine (OPTIVAR) 0.05 % ophthalmic solution INSTILL 1 DROP IN AFFECTED EYE(S) TWICE DAILY FOR 10 DAYS 9/25/2023 Yes Elsa Queen MD     bacitracin 500 UNIT/GM external ointment Apply topically daily as needed for wound care To PEG site.  at prn Yes Elsa Queen MD     Brivaracetam (BRIVIACT) 10 MG/ML solution 100 mg by Oral or Feeding Tube route 2 times daily 0900, 2100 9/25/2023 at 0900 Yes Unknown, Entered By History No    carBAMazepine (TEGRETOL) 100 MG/5ML suspension Take 100 mg by mouth daily Take at 1800 9/25/2023 at 1800 Yes Unknown, Entered By History Yes    carBAMazepine (TEGRETOL) 100  MG/5ML suspension 150 mg by Oral or Feeding Tube route 3 times daily At 06:00, 12:00, and 24:00 for seizures 9/25/2023 at 1200 Yes Unknown, Entered By History Yes    COMPOUNDED NON-CONTROLLED SUBSTANCE (CMPD RX) - PHARMACY TO MIX COMPOUNDED MEDICATION Scopolamine 0.4mg capsules - take 1 capsule by feeding tube three times daily as needed  Patient taking differently: Scopolamine 0.4mg capsules - take  2 capsule by feeding tube three times daily as needed  at prn Yes Elsa Queen MD     glycopyrrolate (ROBINUL) 1 MG tablet Take 1 tablet (1 mg) by mouth 2 times daily as needed (secretions)  at prn Yes Elsa Queen MD No    guaiFENesin (MUCINEX) 600 MG 12 hr tablet Take 1 tablet (600 mg) by mouth 2 times daily as needed for congestion 9/25/2023 Yes Edita Pope,  No    hydrocortisone (CORTAID) 1 % external cream Apply topically 2 times daily as needed Apply to reddened memo areas as needed  at prn Yes Unknown, Entered By History     hydrocortisone (CORTEF) 5 MG tablet Take 15 mg (3 tablets) in the morning and 7.5 mg (1.5 tablet)  at 2:00 PM. During illness patient takes more as a stress dose. Please increase the dose as directed.  Patient taking differently: Take 15 mg (3 tablets) in the morning and 5 mg (1 tablet)  at 2:00 PM. During illness patient takes more as a stress dose. Please increase the dose as directed. 9/25/2023 at 1400 Yes Faizan Mclean MD Yes    levothyroxine (SYNTHROID/LEVOTHROID) 137 MCG tablet Take 1 tablet (137 mcg) by mouth daily 9/25/2023 at 0500 Yes Elsa Queen MD Yes    metoclopramide (REGLAN) 10 MG/10ML SOLN solution Take 10 mLs (10 mg) by mouth 4 times daily (before meals and nightly) 0800, 1200, 1600, 2000 Disconnects bag before administration, then waits 45 mins before reconnecting after giving the medication 9/25/2023 at 1600 Yes Elsa Queen MD     multivitamin, therapeutic (THERA-VIT) TABS tablet Take 1 tablet by mouth daily 9/25/2023 Yes Reported, Patient     mupirocin  "(BACTROBAN) 2 % external ointment APPLY TOPICALLY TO THE AFFECTED AREA TWICE DAILY AS NEEDED  at prn Yes Elsa Queen MD     pantoprazole (PROTONIX) 2 mg/mL SUSP suspension TAKE 20ML PER FEEDING TUBE DAILY 9/25/2023 Yes Elsa Queen MD     sodium bicarbonate 650 MG tablet TAKE 1 TABLET(650 MG) BY MOUTH TWICE DAILY 9/25/2023 at am Yes Elsa Queen MD     testosterone cypionate (DEPOTESTOSTERONE) 200 MG/ML injection Inject 0.25 mLs (50 mg) into the muscle once a week 9/19/2023 Yes Faizan Mclean MD Yes    vitamin B-12 (CYANOCOBALAMIN) 2500 MCG sublingual tablet Take 2,500 mcg by mouth daily 9/25/2023 Yes Unknown, Entered By History     vitamin C (ASCORBIC ACID) 1000 MG TABS 1,000 mg by Oral or Feeding Tube route daily  9/25/2023 Yes Unknown, Entered By History     vitamin D3 (CHOLECALCIFEROL) 2000 units (50 mcg) tablet Take 2,000 Units by mouth daily Crush and feed via j-tube @@ 0900 9/25/2023 Yes Unknown, Entered By History No    insulin syringe-needle U-100 (29G X 1/2\" 1 ML) 29G X 1/2\" 1 ML miscellaneous Syringe needle use as needed   Elsa Queen MD     levothyroxine (SYNTHROID/LEVOTHROID) 150 MCG tablet Take 1 tablet (150 mcg) by mouth daily Six days a week and take 1.5 tablet (225 mcg) one day a week.  Patient not taking: Reported on 9/25/2023 Not Taking  Faizan Mclean MD Yes        "

## 2023-09-26 NOTE — PROGRESS NOTES
[facility] ICU RESPIRATORY NOTE        Date of Admission: 9/25/2023    Date of Intubation (most recent): 9/25/2023    Reason for Mechanical Ventilation: Respiratory failure    Number of Days on Mechanical Ventilation: 1    Met Criteria for Spontaneous Breathing Trial: No    Reason for No Spontaneous Breathing Trial: Per MD    Significant Events Today: Admitted to the ICU, bronchoscopy done.     ABG Results:   Recent Labs   Lab 09/26/23  0300 09/25/23  2319   PH 7.53* 7.52*   PCO2 34* 35   PO2 145* 247*   HCO3 29* 29*   O2PER 80 100         Current Vent Settings: Vent Mode: CMV/AC  (Continuous Mandatory Ventilation/ Assist Control)  FiO2 (%): 50 %  Resp Rate (Set): 20 breaths/min  Tidal Volume (Set, mL): 400 mL (changed per md)  PEEP (cm H2O): 7 cmH2O (changed per md)  Resp: 11      Zeinab Medrano, RT on 9/26/2023 at 6:00 AM

## 2023-09-26 NOTE — CONSULTS
"CLINICAL NUTRITION SERVICES  -  ASSESSMENT NOTE      Recommendations Ordered by Registered Dietitian (RD): Fiber-free formula while intubate and on pressors   Osmolite 1.5 at 60 mL/hr x 22 hours per day (hold TF 1 hour before and 1 hour after Synthroid administration) = 1980 kcal, 83 g protein (1.2 g/kg), 269 g CHO, 0 g fiber, 1006 mL H2O  Total with Propofol = 2226 kcal (32 kcal/kg and 106% needs)  Flushes 60 mL every 4 hours    Malnutrition: % Weight Loss:  None noted (within UBW range)  % Intake:  Decreased intake does not meet criteria for malnutrition (home TF)  Subcutaneous Fat Loss:  None observed  Muscle Loss:  None observed  Fluid Retention:  None noted    Malnutrition Diagnosis: Patient does not meet two of the above criteria necessary for diagnosing malnutrition        REASON FOR ASSESSMENT  Keyon Farias is a 63 year old male seen by Registered Dietitian for Provider Order - Registered Dietitian to Assess and Order TF per Medical Nutrition Therapy Protocol      NUTRITION HISTORY  - Information obtained from [facility], patient is intubated and non-verbal at baseline, mother not present during my visit.  Patient is very well known to our services d/t frequent admissions over the years.    - He has a G-J tube and historically receives Jevity 1.5 at 55 mL/hr at home.  Weight typically runs in the 150-160# range and receives flushes of 100 mL every 4 hours.       CURRENT NUTRITION ORDERS  Diet Order:     NPO   Asked to see patient for TF initiation     Current Intake/Tolerance:  N/A      NUTRITION FOCUSED PHYSICAL ASSESSMENT FOR DIAGNOSING MALNUTRITION)  Yes              Observed:    No nutrition-related physical findings observed    Obtained from Chart/Interdisciplinary Team:  None     ANTHROPOMETRICS  Height: 5' 6\"  Weight: 70.4 kg (155#)(9/26)  Body mass index is 25.05 kg/m   Weight Status:  Overweight BMI 25-29.9  IBW: 64.5 kg   % IBW: 109%  Weight History:   Wt Readings from Last 10 Encounters:   09/26/23 70.4 " kg (155 lb 3.3 oz)   09/21/23 77.1 kg (170 lb)   09/12/23 76.8 kg (169 lb 5 oz)   08/12/23 78.4 kg (172 lb 13.5 oz)   05/10/23 72.6 kg (160 lb)   04/06/23 79.4 kg (175 lb)   03/10/23 73.9 kg (163 lb)   02/15/23 73.9 kg (163 lb)   01/23/23 63.5 kg (140 lb)   01/09/23 63.5 kg (140 lb)     Weight typically runs in 150-160# range per old notes/previous hospitalizations     LABS  Phos 2.0 (L) - getting replacement     MEDICATIONS  Pressor x 2  Propofol at 9.3 mL/hr= 246 kcal   Insulin drip  Synthroid down FT once daily   MVI  Sodium Phos replacement       ASSESSED NUTRITION NEEDS PER APPROVED PRACTICE GUIDELINES:    Dosing Weight 70.4 kg   Estimated Energy Needs: 1000-7066 kcals (25-30 Kcal/Kg)  Justification: maintenance  Estimated Protein Needs:  grams protein (1.2-1.5 g pro/Kg)  Justification: hypercatabolism with acute illness  Estimated Fluid Needs: 0112-8864 mL (1 mL/Kcal)  Justification: maintenance    MALNUTRITION:  % Weight Loss:  None noted (within UBW range)  % Intake:  Decreased intake does not meet criteria for malnutrition (home TF)  Subcutaneous Fat Loss:  None observed  Muscle Loss:  None observed  Fluid Retention:  None noted    Malnutrition Diagnosis: Patient does not meet two of the above criteria necessary for diagnosing malnutrition    NUTRITION DIAGNOSIS:  Inadequate enteral nutrition infusion related to NPO with plans to resume TF today as evidenced by meeting 0% protein and 14% energy needs from Propofol       NUTRITION INTERVENTIONS  Recommendations / Nutrition Prescription  Fiber-free formula while intubate and on pressors   Osmolite 1.5 at 60 mL/hr x 22 hours per day (hold TF 1 hour before and 1 hour after Synthroid administration) = 1980 kcal, 83 g protein (1.2 g/kg), 269 g CHO, 0 g fiber, 1006 mL H2O  Total with Propofol = 2226 kcal (32 kcal/kg and 106% needs)  Flushes 60 mL every 4 hours     Implementation  Nutrition education: Not appropriate at this time due to patient condition  EN  Composition, EN Schedule, Feeding Tube Flush:  Orders written to begin TF at 15 mL/hr and increase every 12 hours by 15 mL to goal.  Flushes as above.   Collaboration and Referral of Nutrition care:  Patient discussed today during interdisciplinary bedside rounds.     Nutrition Goals  Goal TF + Propofol will meet % needs     MONITORING AND EVALUATION:  Progress towards goals will be monitored and evaluated per protocol and Practice Guidelines    Swati Parrish RD, LD, CNSC   Clinical Dietitian - [facility]

## 2023-09-26 NOTE — PROCEDURES
Procedure:   Bronchoscopy         Indication:   Right sided pneumonia        Consent:    Omitted due to medical emergency.       Pre-medication:   The patient is on mechanical ventilation and on drips of propofol at 50 mg, Dilaudid at 0.3 mcg    Bolus medications:  Propofol 75 mg        Procedure Summary:   Time out was performed.   The bronchoscope inserted through the ETT    Airway examination:   Exam of trachea and bronchus of the right and left bronchial tree to the sub-segmental level revealed no endobronchial lesion. Right intermedius appeared to be narrowed but with no evidence of endobronchial mass or lesion. There were minimal secretions and no mucus plugging  Procedure:  Right bronchial washing was done    The patient tolerated the procedure well without undue discomfort, hypotension or arrhythmia. The procedure was performed in the ICU and vital sign parameters were monitored.        Complications:   No immediate complications.     Felix Coelho MD  Pulmonary, Critical Care and Sleep Medicine  [facility]  Pager: [phone]

## 2023-09-27 LAB
ALBUMIN SERPL BCG-MCNC: 2.6 G/DL (ref 3.5–5.2)
ALP SERPL-CCNC: 64 U/L (ref 40–129)
ALT SERPL W P-5'-P-CCNC: 17 U/L (ref 0–70)
ANION GAP SERPL CALCULATED.3IONS-SCNC: 11 MMOL/L (ref 7–15)
AST SERPL W P-5'-P-CCNC: 57 U/L (ref 0–45)
BILIRUB SERPL-MCNC: 0.4 MG/DL
BUN SERPL-MCNC: 21.4 MG/DL (ref 8–23)
CALCIUM SERPL-MCNC: 7.2 MG/DL (ref 8.8–10.2)
CHLORIDE SERPL-SCNC: 104 MMOL/L (ref 98–107)
CREAT SERPL-MCNC: 0.95 MG/DL (ref 0.67–1.17)
DEPRECATED HCO3 PLAS-SCNC: 27 MMOL/L (ref 22–29)
EGFRCR SERPLBLD CKD-EPI 2021: >90 ML/MIN/1.73M2
GLUCOSE BLDC GLUCOMTR-MCNC: 100 MG/DL (ref 70–99)
GLUCOSE BLDC GLUCOMTR-MCNC: 104 MG/DL (ref 70–99)
GLUCOSE BLDC GLUCOMTR-MCNC: 105 MG/DL (ref 70–99)
GLUCOSE BLDC GLUCOMTR-MCNC: 111 MG/DL (ref 70–99)
GLUCOSE BLDC GLUCOMTR-MCNC: 113 MG/DL (ref 70–99)
GLUCOSE BLDC GLUCOMTR-MCNC: 125 MG/DL (ref 70–99)
GLUCOSE BLDC GLUCOMTR-MCNC: 128 MG/DL (ref 70–99)
GLUCOSE BLDC GLUCOMTR-MCNC: 130 MG/DL (ref 70–99)
GLUCOSE BLDC GLUCOMTR-MCNC: 137 MG/DL (ref 70–99)
GLUCOSE BLDC GLUCOMTR-MCNC: 142 MG/DL (ref 70–99)
GLUCOSE BLDC GLUCOMTR-MCNC: 159 MG/DL (ref 70–99)
GLUCOSE BLDC GLUCOMTR-MCNC: 174 MG/DL (ref 70–99)
GLUCOSE BLDC GLUCOMTR-MCNC: 182 MG/DL (ref 70–99)
GLUCOSE BLDC GLUCOMTR-MCNC: 183 MG/DL (ref 70–99)
GLUCOSE BLDC GLUCOMTR-MCNC: 81 MG/DL (ref 70–99)
GLUCOSE BLDC GLUCOMTR-MCNC: 98 MG/DL (ref 70–99)
GLUCOSE SERPL-MCNC: 149 MG/DL (ref 70–99)
LACTATE SERPL-SCNC: 1.7 MMOL/L (ref 0.7–2)
LACTATE SERPL-SCNC: 1.8 MMOL/L (ref 0.7–2)
MAGNESIUM SERPL-MCNC: 2.1 MG/DL (ref 1.7–2.3)
PHOSPHATE SERPL-MCNC: 1.9 MG/DL (ref 2.5–4.5)
PHOSPHATE SERPL-MCNC: 2.6 MG/DL (ref 2.5–4.5)
POTASSIUM SERPL-SCNC: 3.4 MMOL/L (ref 3.4–5.3)
POTASSIUM SERPL-SCNC: 3.9 MMOL/L (ref 3.4–5.3)
PROT SERPL-MCNC: 5.5 G/DL (ref 6.4–8.3)
SODIUM SERPL-SCNC: 142 MMOL/L (ref 135–145)
VANCOMYCIN SERPL-MCNC: 16.7 UG/ML

## 2023-09-27 PROCEDURE — 250N000013 HC RX MED GY IP 250 OP 250 PS 637: Performed by: INTERNAL MEDICINE

## 2023-09-27 PROCEDURE — 200N000001 HC R&B ICU

## 2023-09-27 PROCEDURE — 84100 ASSAY OF PHOSPHORUS: CPT | Performed by: INTERNAL MEDICINE

## 2023-09-27 PROCEDURE — 250N000013 HC RX MED GY IP 250 OP 250 PS 637: Performed by: STUDENT IN AN ORGANIZED HEALTH CARE EDUCATION/TRAINING PROGRAM

## 2023-09-27 PROCEDURE — 84132 ASSAY OF SERUM POTASSIUM: CPT | Performed by: INTERNAL MEDICINE

## 2023-09-27 PROCEDURE — 83605 ASSAY OF LACTIC ACID: CPT | Performed by: INTERNAL MEDICINE

## 2023-09-27 PROCEDURE — 250N000011 HC RX IP 250 OP 636: Mod: JZ | Performed by: INTERNAL MEDICINE

## 2023-09-27 PROCEDURE — 94003 VENT MGMT INPAT SUBQ DAY: CPT

## 2023-09-27 PROCEDURE — 250N000011 HC RX IP 250 OP 636: Mod: JZ | Performed by: STUDENT IN AN ORGANIZED HEALTH CARE EDUCATION/TRAINING PROGRAM

## 2023-09-27 PROCEDURE — 999N000259 HC STATISTIC EXTUBATION

## 2023-09-27 PROCEDURE — 83735 ASSAY OF MAGNESIUM: CPT | Performed by: INTERNAL MEDICINE

## 2023-09-27 PROCEDURE — 999N000253 HC STATISTIC WEANING TRIALS

## 2023-09-27 PROCEDURE — 80053 COMPREHEN METABOLIC PANEL: CPT | Performed by: STUDENT IN AN ORGANIZED HEALTH CARE EDUCATION/TRAINING PROGRAM

## 2023-09-27 PROCEDURE — 258N000003 HC RX IP 258 OP 636: Performed by: STUDENT IN AN ORGANIZED HEALTH CARE EDUCATION/TRAINING PROGRAM

## 2023-09-27 PROCEDURE — 80202 ASSAY OF VANCOMYCIN: CPT | Performed by: INTERNAL MEDICINE

## 2023-09-27 PROCEDURE — 99291 CRITICAL CARE FIRST HOUR: CPT | Performed by: INTERNAL MEDICINE

## 2023-09-27 PROCEDURE — 999N000157 HC STATISTIC RCP TIME EA 10 MIN

## 2023-09-27 RX ORDER — HYDROMORPHONE HYDROCHLORIDE 1 MG/ML
.3-.5 INJECTION, SOLUTION INTRAMUSCULAR; INTRAVENOUS; SUBCUTANEOUS
Status: DISCONTINUED | OUTPATIENT
Start: 2023-09-27 | End: 2023-10-03 | Stop reason: HOSPADM

## 2023-09-27 RX ORDER — ALBUTEROL SULFATE 0.83 MG/ML
2.5 SOLUTION RESPIRATORY (INHALATION) EVERY 6 HOURS PRN
Status: DISCONTINUED | OUTPATIENT
Start: 2023-09-27 | End: 2023-10-03 | Stop reason: HOSPADM

## 2023-09-27 RX ORDER — POTASSIUM CHLORIDE 1.5 G/1.58G
40 POWDER, FOR SOLUTION ORAL ONCE
Status: COMPLETED | OUTPATIENT
Start: 2023-09-27 | End: 2023-09-27

## 2023-09-27 RX ORDER — AMOXICILLIN 250 MG
1 CAPSULE ORAL 2 TIMES DAILY PRN
Status: DISCONTINUED | OUTPATIENT
Start: 2023-09-27 | End: 2023-10-03 | Stop reason: HOSPADM

## 2023-09-27 RX ORDER — LORAZEPAM 2 MG/ML
.5-1 INJECTION INTRAMUSCULAR EVERY 4 HOURS PRN
Status: DISCONTINUED | OUTPATIENT
Start: 2023-09-27 | End: 2023-10-03 | Stop reason: HOSPADM

## 2023-09-27 RX ORDER — VANCOMYCIN HYDROCHLORIDE 1 G/200ML
1000 INJECTION, SOLUTION INTRAVENOUS EVERY 12 HOURS
Status: DISCONTINUED | OUTPATIENT
Start: 2023-09-27 | End: 2023-09-29 | Stop reason: DRUGHIGH

## 2023-09-27 RX ADMIN — VANCOMYCIN HYDROCHLORIDE 1000 MG: 1 INJECTION, SOLUTION INTRAVENOUS at 22:21

## 2023-09-27 RX ADMIN — PIPERACILLIN AND TAZOBACTAM 3.38 G: 3; .375 INJECTION, POWDER, FOR SOLUTION INTRAVENOUS at 21:25

## 2023-09-27 RX ADMIN — CHLORHEXIDINE GLUCONATE 15 ML: 1.2 SOLUTION ORAL at 07:45

## 2023-09-27 RX ADMIN — Medication 60 ML: at 14:06

## 2023-09-27 RX ADMIN — BRIVARACETAM 100 MG: 10 SOLUTION ORAL at 09:05

## 2023-09-27 RX ADMIN — MULTIVITAMIN TABLET 1 TABLET: TABLET at 08:35

## 2023-09-27 RX ADMIN — PIPERACILLIN AND TAZOBACTAM 3.38 G: 3; .375 INJECTION, POWDER, FOR SOLUTION INTRAVENOUS at 02:20

## 2023-09-27 RX ADMIN — HYDROCORTISONE SODIUM SUCCINATE 50 MG: 100 INJECTION, POWDER, FOR SOLUTION INTRAMUSCULAR; INTRAVENOUS at 22:21

## 2023-09-27 RX ADMIN — Medication 40 MG: at 07:40

## 2023-09-27 RX ADMIN — CARBAMAZEPINE 100 MG: 100 SUSPENSION ORAL at 17:58

## 2023-09-27 RX ADMIN — CARBAMAZEPINE 150 MG: 100 SUSPENSION ORAL at 06:07

## 2023-09-27 RX ADMIN — CARBAMAZEPINE 150 MG: 100 SUSPENSION ORAL at 11:56

## 2023-09-27 RX ADMIN — HEPARIN SODIUM 5000 UNITS: 5000 INJECTION, SOLUTION INTRAVENOUS; SUBCUTANEOUS at 07:46

## 2023-09-27 RX ADMIN — BRIVARACETAM 100 MG: 10 SOLUTION ORAL at 21:29

## 2023-09-27 RX ADMIN — PIPERACILLIN AND TAZOBACTAM 3.38 G: 3; .375 INJECTION, POWDER, FOR SOLUTION INTRAVENOUS at 07:46

## 2023-09-27 RX ADMIN — POTASSIUM & SODIUM PHOSPHATES POWDER PACK 280-160-250 MG 2 PACKET: 280-160-250 PACK at 13:13

## 2023-09-27 RX ADMIN — POTASSIUM & SODIUM PHOSPHATES POWDER PACK 280-160-250 MG 2 PACKET: 280-160-250 PACK at 06:07

## 2023-09-27 RX ADMIN — CARBAMAZEPINE 150 MG: 100 SUSPENSION ORAL at 00:09

## 2023-09-27 RX ADMIN — HYDROCORTISONE SODIUM SUCCINATE 50 MG: 100 INJECTION, POWDER, FOR SOLUTION INTRAMUSCULAR; INTRAVENOUS at 17:50

## 2023-09-27 RX ADMIN — HYDROCORTISONE SODIUM SUCCINATE 50 MG: 100 INJECTION, POWDER, FOR SOLUTION INTRAMUSCULAR; INTRAVENOUS at 04:16

## 2023-09-27 RX ADMIN — LEVOTHYROXINE SODIUM 150 MCG: 75 TABLET ORAL at 08:34

## 2023-09-27 RX ADMIN — VANCOMYCIN HYDROCHLORIDE 1000 MG: 1 INJECTION, SOLUTION INTRAVENOUS at 09:16

## 2023-09-27 RX ADMIN — HYDROCORTISONE SODIUM SUCCINATE 50 MG: 100 INJECTION, POWDER, FOR SOLUTION INTRAMUSCULAR; INTRAVENOUS at 11:55

## 2023-09-27 RX ADMIN — POTASSIUM & SODIUM PHOSPHATES POWDER PACK 280-160-250 MG 2 PACKET: 280-160-250 PACK at 08:35

## 2023-09-27 RX ADMIN — SODIUM BICARBONATE 650 MG TABLET 650 MG: at 21:29

## 2023-09-27 RX ADMIN — HEPARIN SODIUM 5000 UNITS: 5000 INJECTION, SOLUTION INTRAVENOUS; SUBCUTANEOUS at 00:09

## 2023-09-27 RX ADMIN — SODIUM BICARBONATE 650 MG TABLET 650 MG: at 08:35

## 2023-09-27 RX ADMIN — HEPARIN SODIUM 5000 UNITS: 5000 INJECTION, SOLUTION INTRAVENOUS; SUBCUTANEOUS at 16:05

## 2023-09-27 RX ADMIN — PIPERACILLIN AND TAZOBACTAM 3.38 G: 3; .375 INJECTION, POWDER, FOR SOLUTION INTRAVENOUS at 13:20

## 2023-09-27 RX ADMIN — POTASSIUM CHLORIDE 40 MEQ: 1.5 POWDER, FOR SOLUTION ORAL at 06:07

## 2023-09-27 RX ADMIN — VASOPRESSIN 1.2 UNITS/HR: 20 INJECTION, SOLUTION INTRAMUSCULAR; SUBCUTANEOUS at 05:59

## 2023-09-27 RX ADMIN — FLUDROCORTISONE ACETATE 0.1 MG: 0.1 TABLET ORAL at 08:35

## 2023-09-27 ASSESSMENT — ACTIVITIES OF DAILY LIVING (ADL)
ADLS_ACUITY_SCORE: 49
ADLS_ACUITY_SCORE: 53
ADLS_ACUITY_SCORE: 49

## 2023-09-27 NOTE — PROGRESS NOTES
Chart reviewed, patient was seen for a complete assessment yesterday --> see note dated 9/26/23 for details.    He is currently advancing TF towards goal which will be -->    Nutrition Support Enteral:  Type of Feeding Tube: G-J tube   Enteral Frequency:  Continuous  Enteral Regimen: Osmolite 1.5 at 60 mL/hr x 22 hours per day (holding TF 1 hour before and 1 hour after Synthroid)  Total Enteral Provisions: 1980 kcal (28 kcal/kg), 83 g protein (1.2 g/kg), 269 g CHO, 0 g fiber, 1006 mL H2O  Free Water Flush: 60 mL every 4 hours     K/Mg normal  Phos 1.9 (L) - getting replacement   -128 - Insulin drip   BM x 1 yesterday   G-tube = 500  mL out   I/O 1605/1745, wt 72.2 kg (up from admit)  Pressor x 2 (less reliant per MD note)  Propofol off yesterday    ASSESSED NUTRITION NEEDS PER APPROVED PRACTICE GUIDELINES:     Dosing Weight 70.4 kg   Estimated Energy Needs: 1105-8431 kcals (25-30 Kcal/Kg)  Justification: maintenance  Estimated Protein Needs:  grams protein (1.2-1.5 g pro/Kg)  Justification: hypercatabolism with acute illness  Estimated Fluid Needs: 8391-8202 mL (1 mL/Kcal)  Justification: maintenance    Noted WOCN eval --> Pressure injury (buttocks, coccyx) - no stage noted  Already receiving Certavite daily     Will add Expedite once daily down FT for wound healing   Expedite one bottle per day = 100 kcal and 10 g protein     Total (TF + Expedite)= 2080 kcal (30 kcal/kg), 93 g protein (1.3 g/kg)    Swati Parrish, RD, LD, CNSC   Clinical Dietitian - [facility]
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
## 2023-09-27 NOTE — PHARMACY-VANCOMYCIN DOSING SERVICE
"Pharmacy Vancomycin Note  Date of Service 2023  Patient's  1960   63 year old, male    Indication: Aspiration Pneumonia and Sepsis  Day of Therapy: 3  Current vancomycin regimen:  1250 mg IV q24h  Current vancomycin monitoring method: AUC  Current vancomycin therapeutic monitoring goal: 400-600 mg*h/L    InsightRX Prediction of Current Vancomycin Regimen  Regimen: 1250 mg IV every 24 hours.  Exposure target: AUC24 (range)400-600 mg/L.hr   AUC24,ss: 354 mg/L.hr  Probability of AUC24 > 400: 37 %  Ctrough,ss: 9.5 mg/L  Probability of Ctrough,ss > 20: 3 %  Probability of nephrotoxicity (Lodise ERNESTO ): 5 %      Current estimated CrCl = Estimated Creatinine Clearance: 83.4 mL/min (based on SCr of 0.95 mg/dL).    Creatinine for last 3 days  2023:  6:40 PM Creatinine 2.12 mg/dL  2023: 12:43 AM Creatinine 1.64 mg/dL;  5:42 AM Creatinine 1.43 mg/dL  2023:  4:14 AM Creatinine 0.95 mg/dL    Recent Vancomycin Levels (past 3 days)  2023:  4:14 AM Vancomycin 16.7 ug/mL    Vancomycin IV Administrations (past 72 hours)                     vancomycin (VANCOCIN) 1,250 mg in 0.9% NaCl 250 mL intermittent infusion (mg) 1,250 mg New Bag 23    vancomycin (VANCOCIN) 1,750 mg in 0.9% NaCl 500 mL intermittent infusion (mg) 1,750 mg Given 23                    Nephrotoxins and other renal medications (From now, onward)      Start     Dose/Rate Route Frequency Ordered Stop    23 0900  vancomycin (VANCOCIN) 1,000 mg in 200 mL dextrose intermittent infusion         1,000 mg  200 mL/hr over 1 Hours Intravenous EVERY 12 HOURS 23 0734      23 0200  piperacillin-tazobactam (ZOSYN) 3.375 g vial to attach to  mL bag        Note to Pharmacy: For [facility], [facility] and [facility]: For Zosyn-naive patients, use the \"Zosyn initial dose + extended infusion\" order panel.    3.375 g  over 30 Minutes Intravenous EVERY 6 HOURS 238      23 2300  norepinephrine (LEVOPHED) " 16 mg in  mL infusion MAX CONC CENTRAL LINE         0.01-0.6 mcg/kg/min × 77.1 kg  0.7-43.4 mL/hr  Intravenous CONTINUOUS 09/25/23 2257 09/25/23 2020  vasopressin 0.2 units/mL in NS (PITRESSIN) standard conc infusion         2.4 Units/hr  12 mL/hr  Intravenous CONTINUOUS 09/25/23 2019                 Contrast Orders - past 72 hours (72h ago, onward)      Start     Dose/Rate Route Frequency Stop    09/26/23 0930  perflutren diluted 1mL to 2mL with saline (OPTISON) diluted injection 9 mL         9 mL Intravenous ONCE 09/26/23 0928 09/25/23 1910  iopamidol (ISOVUE-370) solution 85 mL         85 mL Intravenous ONCE 09/25/23 1918            Interpretation of levels and current regimen:  Vancomycin level is reflective of AUC less than 400    Has serum creatinine changed greater than 50% in last 72 hours: Yes    Urine output:  good urine output    Renal Function: Improving    InsightRX Prediction of Planned New Vancomycin Regimen  Regimen: 1000 mg IV every 12 hours.  Start time: 19:47 on 09/27/2023  Exposure target: AUC24 (range)400-600 mg/L.hr   AUC24,ss: 551 mg/L.hr  Probability of AUC24 > 400: 92 %  Ctrough,ss: 18.1 mg/L  Probability of Ctrough,ss > 20: 38 %  Probability of nephrotoxicity (Lodise ERNESTO 2009): 14 %      Plan:  Increase Dose to 1000mg q12h  Vancomycin monitoring method: AUC  Vancomycin therapeutic monitoring goal: 400-600 mg*h/L  Pharmacy will check vancomycin levels as appropriate in 1-3 Days.  Serum creatinine levels will be ordered a minimum of twice weekly.    Elen Walter [facility]

## 2023-09-27 NOTE — PLAN OF CARE
Care provided from 7a to 7p    Neuro: nonverbal baseline, PERRL but bloodshot on L eye. Follows commands except for RUE, baseline.     CV: Normal sinus and hector at times. Map >65 maintained with levo. Vaso off today. Pulses are weak, skin is cool and pale.     Resp: Extubated today at 1500. LS coarse.     GI/: Three watery BM this shift in late afternoon. OG removed. TF in jpeg at 45 ml/hr, increase to goal at 0200. BG checks are now Q2, insulin drip on Alg 4. Lerma in place, adequate output.

## 2023-09-27 NOTE — PLAN OF CARE
Goal Outcome Evaluation:      Plan of Care Reviewed With: parent    Overall Patient Progress: no change    Neuro: nonverbal baseline, PERRL but sluggish, red, scleral edema improving. Follows commands on L, baseline.    CV: Between normal sinus and hector, occasional PAC's. Map >65 maintained with vaso and levo. Pulses are weak, skin is cool and pale.      Resp: vent settings R-16, TV- 400, FiO2 30%, Peep- 5, 02 sat in the mids to upper 90s. LS clear, no breathing trial done.     GI/: No BM this shift, hypoactive sounds. OG to LIS. TF in jpeg at 30 ml/hr, to be increase at 1400. BG checks  q2h insulin drip on alg 4.         Lactic down to 1.8 at 0600

## 2023-09-27 NOTE — PROGRESS NOTES
Pt extubated to 3L NC per MD order.  LS are diminished t/o and no stridor noted.  Will continue to follow    Red Brown, RT  9/27/2023

## 2023-09-27 NOTE — PROGRESS NOTES
Formerly [facility], [facility] ICU RESPIRATORY NOTE        Date of Admission: 9/25/2023    Date of Intubation (most recent): 9/25/23    Reason for Mechanical Ventilation: Respiratory failure    Number of Days on Mechanical Ventilation: 3    Met Criteria for Spontaneous Breathing Trial: No    Reason for No Spontaneous Breathing Trial: Per MD    Significant Events Today: None    ABG Results:   Recent Labs   Lab 09/26/23  1845 09/26/23  1516 09/26/23  1211 09/26/23  0300   PH 7.43 7.44 7.42 7.53*   PCO2 47* 46* 48* 34*   PO2 119* 142* 160* 145*   HCO3 32* 32* 31* 29*   O2PER 30 30 40 80         Current Vent Settings: Vent Mode: CMV/AC  (Continuous Mandatory Ventilation/ Assist Control)  FiO2 (%): 30 %  Resp Rate (Set): 14 breaths/min  Tidal Volume (Set, mL): 400 mL  PEEP (cm H2O): 5 cmH2O  Resp: 16      Skin Assessment: intact    Plan: We will continue to monitor and assess for wean in the morning.    Rabia Medina RT on 9/27/2023 at 5:09 AM

## 2023-09-28 LAB
ALBUMIN SERPL BCG-MCNC: 2.7 G/DL (ref 3.5–5.2)
ALP SERPL-CCNC: 62 U/L (ref 40–129)
ALT SERPL W P-5'-P-CCNC: 20 U/L (ref 0–70)
ANION GAP SERPL CALCULATED.3IONS-SCNC: 13 MMOL/L (ref 7–15)
AST SERPL W P-5'-P-CCNC: 65 U/L (ref 0–45)
BASOPHILS # BLD AUTO: 0 10E3/UL (ref 0–0.2)
BASOPHILS NFR BLD AUTO: 0 %
BILIRUB SERPL-MCNC: 0.2 MG/DL
BUN SERPL-MCNC: 14.1 MG/DL (ref 8–23)
CALCIUM SERPL-MCNC: 7.7 MG/DL (ref 8.8–10.2)
CHLORIDE SERPL-SCNC: 111 MMOL/L (ref 98–107)
CREAT SERPL-MCNC: 0.82 MG/DL (ref 0.67–1.17)
EGFRCR SERPLBLD CKD-EPI 2021: >90 ML/MIN/1.73M2
EOSINOPHIL # BLD AUTO: 0 10E3/UL (ref 0–0.7)
EOSINOPHIL NFR BLD AUTO: 0 %
ERYTHROCYTE [DISTWIDTH] IN BLOOD BY AUTOMATED COUNT: 14.6 % (ref 10–15)
GLUCOSE BLDC GLUCOMTR-MCNC: 106 MG/DL (ref 70–99)
GLUCOSE BLDC GLUCOMTR-MCNC: 109 MG/DL (ref 70–99)
GLUCOSE BLDC GLUCOMTR-MCNC: 133 MG/DL (ref 70–99)
GLUCOSE BLDC GLUCOMTR-MCNC: 140 MG/DL (ref 70–99)
GLUCOSE BLDC GLUCOMTR-MCNC: 144 MG/DL (ref 70–99)
GLUCOSE BLDC GLUCOMTR-MCNC: 158 MG/DL (ref 70–99)
GLUCOSE BLDC GLUCOMTR-MCNC: 167 MG/DL (ref 70–99)
GLUCOSE BLDC GLUCOMTR-MCNC: 176 MG/DL (ref 70–99)
GLUCOSE BLDC GLUCOMTR-MCNC: 53 MG/DL (ref 70–99)
GLUCOSE BLDC GLUCOMTR-MCNC: 86 MG/DL (ref 70–99)
GLUCOSE BLDC GLUCOMTR-MCNC: 87 MG/DL (ref 70–99)
GLUCOSE BLDC GLUCOMTR-MCNC: 88 MG/DL (ref 70–99)
GLUCOSE BLDC GLUCOMTR-MCNC: 89 MG/DL (ref 70–99)
GLUCOSE SERPL-MCNC: 187 MG/DL (ref 70–99)
HCO3 SERPL-SCNC: 26 MMOL/L (ref 22–29)
HCT VFR BLD AUTO: 37 % (ref 40–53)
HGB BLD-MCNC: 11.8 G/DL (ref 13.3–17.7)
IMM GRANULOCYTES # BLD: 0 10E3/UL
IMM GRANULOCYTES NFR BLD: 0 %
LYMPHOCYTES # BLD AUTO: 1.2 10E3/UL (ref 0.8–5.3)
LYMPHOCYTES NFR BLD AUTO: 12 %
MAGNESIUM SERPL-MCNC: 2 MG/DL (ref 1.7–2.3)
MCH RBC QN AUTO: 29.2 PG (ref 26.5–33)
MCHC RBC AUTO-ENTMCNC: 31.9 G/DL (ref 31.5–36.5)
MCV RBC AUTO: 92 FL (ref 78–100)
MONOCYTES # BLD AUTO: 0.5 10E3/UL (ref 0–1.3)
MONOCYTES NFR BLD AUTO: 5 %
NEUTROPHILS # BLD AUTO: 8.8 10E3/UL (ref 1.6–8.3)
NEUTROPHILS NFR BLD AUTO: 83 %
NRBC # BLD AUTO: 0 10E3/UL
NRBC BLD AUTO-RTO: 0 /100
PHOSPHATE SERPL-MCNC: 1.7 MG/DL (ref 2.5–4.5)
PHOSPHATE SERPL-MCNC: 1.8 MG/DL (ref 2.5–4.5)
PLATELET # BLD AUTO: 123 10E3/UL (ref 150–450)
POTASSIUM SERPL-SCNC: 3.1 MMOL/L (ref 3.4–5.3)
POTASSIUM SERPL-SCNC: 4.2 MMOL/L (ref 3.4–5.3)
PROT SERPL-MCNC: 5.7 G/DL (ref 6.4–8.3)
RBC # BLD AUTO: 4.04 10E6/UL (ref 4.4–5.9)
SODIUM SERPL-SCNC: 150 MMOL/L (ref 135–145)
WBC # BLD AUTO: 10.5 10E3/UL (ref 4–11)

## 2023-09-28 PROCEDURE — 83735 ASSAY OF MAGNESIUM: CPT | Performed by: INTERNAL MEDICINE

## 2023-09-28 PROCEDURE — 84100 ASSAY OF PHOSPHORUS: CPT | Performed by: INTERNAL MEDICINE

## 2023-09-28 PROCEDURE — 999N000040 HC STATISTIC CONSULT NO CHARGE VASC ACCESS

## 2023-09-28 PROCEDURE — 250N000013 HC RX MED GY IP 250 OP 250 PS 637: Performed by: PHYSICIAN ASSISTANT

## 2023-09-28 PROCEDURE — 250N000013 HC RX MED GY IP 250 OP 250 PS 637: Performed by: INTERNAL MEDICINE

## 2023-09-28 PROCEDURE — 85025 COMPLETE CBC W/AUTO DIFF WBC: CPT | Performed by: STUDENT IN AN ORGANIZED HEALTH CARE EDUCATION/TRAINING PROGRAM

## 2023-09-28 PROCEDURE — 36415 COLL VENOUS BLD VENIPUNCTURE: CPT | Performed by: INTERNAL MEDICINE

## 2023-09-28 PROCEDURE — 250N000011 HC RX IP 250 OP 636: Mod: JZ

## 2023-09-28 PROCEDURE — 999N000127 HC STATISTIC PERIPHERAL IV START W US GUIDANCE

## 2023-09-28 PROCEDURE — 999N000128 HC STATISTIC PERIPHERAL IV START W/O US GUIDANCE

## 2023-09-28 PROCEDURE — 250N000013 HC RX MED GY IP 250 OP 250 PS 637: Performed by: STUDENT IN AN ORGANIZED HEALTH CARE EDUCATION/TRAINING PROGRAM

## 2023-09-28 PROCEDURE — 250N000011 HC RX IP 250 OP 636: Mod: JZ | Performed by: INTERNAL MEDICINE

## 2023-09-28 PROCEDURE — 80053 COMPREHEN METABOLIC PANEL: CPT | Performed by: STUDENT IN AN ORGANIZED HEALTH CARE EDUCATION/TRAINING PROGRAM

## 2023-09-28 PROCEDURE — 250N000012 HC RX MED GY IP 250 OP 636 PS 637: Performed by: INTERNAL MEDICINE

## 2023-09-28 PROCEDURE — 250N000011 HC RX IP 250 OP 636: Mod: JZ | Performed by: STUDENT IN AN ORGANIZED HEALTH CARE EDUCATION/TRAINING PROGRAM

## 2023-09-28 PROCEDURE — 84132 ASSAY OF SERUM POTASSIUM: CPT | Performed by: INTERNAL MEDICINE

## 2023-09-28 PROCEDURE — 120N000001 HC R&B MED SURG/OB

## 2023-09-28 PROCEDURE — 99233 SBSQ HOSP IP/OBS HIGH 50: CPT | Performed by: INTERNAL MEDICINE

## 2023-09-28 PROCEDURE — 258N000001 HC RX 258: Performed by: STUDENT IN AN ORGANIZED HEALTH CARE EDUCATION/TRAINING PROGRAM

## 2023-09-28 RX ORDER — ONDANSETRON 2 MG/ML
4 INJECTION INTRAMUSCULAR; INTRAVENOUS EVERY 6 HOURS PRN
Status: DISCONTINUED | OUTPATIENT
Start: 2023-09-28 | End: 2023-10-03 | Stop reason: HOSPADM

## 2023-09-28 RX ORDER — POTASSIUM CHLORIDE 1.5 G/1.58G
40 POWDER, FOR SOLUTION ORAL ONCE
Status: COMPLETED | OUTPATIENT
Start: 2023-09-28 | End: 2023-09-28

## 2023-09-28 RX ORDER — ONDANSETRON 2 MG/ML
INJECTION INTRAMUSCULAR; INTRAVENOUS
Status: COMPLETED
Start: 2023-09-28 | End: 2023-09-28

## 2023-09-28 RX ORDER — NICOTINE POLACRILEX 4 MG
15-30 LOZENGE BUCCAL
Status: DISCONTINUED | OUTPATIENT
Start: 2023-09-28 | End: 2023-09-28

## 2023-09-28 RX ORDER — DEXTROSE MONOHYDRATE 25 G/50ML
25-50 INJECTION, SOLUTION INTRAVENOUS
Status: DISCONTINUED | OUTPATIENT
Start: 2023-09-28 | End: 2023-09-28

## 2023-09-28 RX ADMIN — Medication 20 MG: at 08:53

## 2023-09-28 RX ADMIN — POTASSIUM & SODIUM PHOSPHATES POWDER PACK 280-160-250 MG 2 PACKET: 280-160-250 PACK at 06:14

## 2023-09-28 RX ADMIN — Medication 60 ML: at 13:20

## 2023-09-28 RX ADMIN — HEPARIN SODIUM 5000 UNITS: 5000 INJECTION, SOLUTION INTRAVENOUS; SUBCUTANEOUS at 20:12

## 2023-09-28 RX ADMIN — PIPERACILLIN AND TAZOBACTAM 3.38 G: 3; .375 INJECTION, POWDER, FOR SOLUTION INTRAVENOUS at 20:44

## 2023-09-28 RX ADMIN — HEPARIN SODIUM 5000 UNITS: 5000 INJECTION, SOLUTION INTRAVENOUS; SUBCUTANEOUS at 00:11

## 2023-09-28 RX ADMIN — HEPARIN SODIUM 5000 UNITS: 5000 INJECTION, SOLUTION INTRAVENOUS; SUBCUTANEOUS at 08:49

## 2023-09-28 RX ADMIN — HYDROCORTISONE SODIUM SUCCINATE 50 MG: 100 INJECTION, POWDER, FOR SOLUTION INTRAMUSCULAR; INTRAVENOUS at 04:54

## 2023-09-28 RX ADMIN — CARBAMAZEPINE 150 MG: 100 SUSPENSION ORAL at 06:13

## 2023-09-28 RX ADMIN — VANCOMYCIN HYDROCHLORIDE 1000 MG: 1 INJECTION, SOLUTION INTRAVENOUS at 09:49

## 2023-09-28 RX ADMIN — ONDANSETRON 4 MG: 2 INJECTION INTRAMUSCULAR; INTRAVENOUS at 16:59

## 2023-09-28 RX ADMIN — DEXTROSE MONOHYDRATE 25 ML: 25 INJECTION, SOLUTION INTRAVENOUS at 20:44

## 2023-09-28 RX ADMIN — BRIVARACETAM 100 MG: 10 SOLUTION ORAL at 09:49

## 2023-09-28 RX ADMIN — POTASSIUM & SODIUM PHOSPHATES POWDER PACK 280-160-250 MG 2 PACKET: 280-160-250 PACK at 13:24

## 2023-09-28 RX ADMIN — DEXTROSE MONOHYDRATE 50 ML: 25 INJECTION, SOLUTION INTRAVENOUS at 20:08

## 2023-09-28 RX ADMIN — SODIUM BICARBONATE 650 MG TABLET 650 MG: at 08:53

## 2023-09-28 RX ADMIN — PIPERACILLIN AND TAZOBACTAM 3.38 G: 3; .375 INJECTION, POWDER, FOR SOLUTION INTRAVENOUS at 13:25

## 2023-09-28 RX ADMIN — CARBAMAZEPINE 150 MG: 100 SUSPENSION ORAL at 00:11

## 2023-09-28 RX ADMIN — INSULIN GLARGINE 20 UNITS: 100 INJECTION, SOLUTION SUBCUTANEOUS at 08:59

## 2023-09-28 RX ADMIN — CARBAMAZEPINE 150 MG: 100 SUSPENSION ORAL at 14:28

## 2023-09-28 RX ADMIN — LEVOTHYROXINE SODIUM 150 MCG: 75 TABLET ORAL at 08:47

## 2023-09-28 RX ADMIN — POTASSIUM CHLORIDE 40 MEQ: 1.5 POWDER, FOR SOLUTION ORAL at 06:13

## 2023-09-28 RX ADMIN — PIPERACILLIN AND TAZOBACTAM 3.38 G: 3; .375 INJECTION, POWDER, FOR SOLUTION INTRAVENOUS at 08:47

## 2023-09-28 RX ADMIN — MULTIVITAMIN TABLET 1 TABLET: TABLET at 08:53

## 2023-09-28 RX ADMIN — SODIUM BICARBONATE 650 MG TABLET 650 MG: at 20:12

## 2023-09-28 RX ADMIN — VANCOMYCIN HYDROCHLORIDE 1000 MG: 1 INJECTION, SOLUTION INTRAVENOUS at 21:13

## 2023-09-28 RX ADMIN — POTASSIUM & SODIUM PHOSPHATES POWDER PACK 280-160-250 MG 2 PACKET: 280-160-250 PACK at 09:51

## 2023-09-28 RX ADMIN — POTASSIUM & SODIUM PHOSPHATES POWDER PACK 280-160-250 MG 2 PACKET: 280-160-250 PACK at 20:12

## 2023-09-28 RX ADMIN — BRIVARACETAM 100 MG: 10 SOLUTION ORAL at 21:13

## 2023-09-28 RX ADMIN — PIPERACILLIN AND TAZOBACTAM 3.38 G: 3; .375 INJECTION, POWDER, FOR SOLUTION INTRAVENOUS at 02:10

## 2023-09-28 RX ADMIN — CARBAMAZEPINE 100 MG: 100 SUSPENSION ORAL at 17:26

## 2023-09-28 RX ADMIN — HYDROCORTISONE SODIUM SUCCINATE 50 MG: 100 INJECTION, POWDER, FOR SOLUTION INTRAMUSCULAR; INTRAVENOUS at 17:01

## 2023-09-28 RX ADMIN — ACETAMINOPHEN 650 MG: 325 SUSPENSION ORAL at 17:26

## 2023-09-28 ASSESSMENT — ACTIVITIES OF DAILY LIVING (ADL)
ADLS_ACUITY_SCORE: 53
ADLS_ACUITY_SCORE: 53
ADLS_ACUITY_SCORE: 49
ADLS_ACUITY_SCORE: 53

## 2023-09-28 NOTE — PLAN OF CARE
Goal Outcome Evaluation:    Neuro/RASS: Alert but unable to assess orientation d/t pt being non verbal baseline. RASS 0 to -1. Follows commands. Baseline contracted to RUE, but spastic at times. PERRL. Moves BLE and LUE. Bilateral   conjunctivitis-red and dry eyes.     Tele/CV: SR/SB no SOB identified. MAP >65. Levo stopped at 2230.      Resp/Pulm: LS coarse and diminished. Productive cough but swallows secretions and refuses suction Extubated 9/27 @1500. Pt removed NC and has been on RA sating at 97-98%.     GI/: Voiding via aaron UOP adequate. X2 BM's this shift. Loose, watery and brown. TF increased to goal of 60ml w/ Q4hr flushes via PEG tube.     Integumentary: Rash to thighs and groin area. Meplix to coccyx- red small open wound-see WOC note.     Infusion/IV access:Triple lumen CVC and art line to Lt groin. Dressing changed, CDI.
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
## 2023-09-28 NOTE — PROGRESS NOTES
ICU Room 350. Pt just had an episode of emesis that was large and TF tan in color. TF stopped, pt sat up to high fowlers and PEG tubes gastric lumen now hooked to LIS per Dr. Sesay, no out put tho so far. Zofran given per Dr. Sesay, chest xray ordered for possible aspiration of emesis. ALBERT Hooker paged. Order received to restarted tf 1 or 2 hours of being off. Refer to ALBERT Hooker's noted for detail.     Pt had a second episode of emesis. This time it was small and bile green in color. ALBERT Hooker paged. No order received.     Pt continued to have Multiple stools throughout the day (4-5 stool, loose watery brown). Pt on TF and ABX so not a candidate for sending a stool sample for C-diff.    Aaron left in for wound healing. Talked with the ALBERT Hooker regarding leaving aaron on and she wants it left in until tomorrow.     Skin starting from the posterior of the scrotum and extending to the sacrum area is macerated and peeling away do to moister damage. Pt is frequently stooling which is hindering wound heal. Wound care completed after each BM.  Refer to WOC note for wound care orders. Deep Red none blanchable line that crosses right over the rectum almost. The red line crosses over the rectum from the left butt cheek to the right cheek. The color of the line as it extends further across the right cheek turns to a lighter pink color that is non blanchable. I could be mixing up my cheeks tho, please verify. The deep red diamond and the lighter pink diamond has very defined lines. Possible acquired outside the hospital maybe. No bed pans used during day shift. So not sure where the diamond originated from.

## 2023-09-28 NOTE — PROGRESS NOTES
Mr. Farias had a large emesis this afternoon while receiving tube feeds. His tube feeds were stopped and bedside suction was used to remove any remaining food in his mouth. He likely aspirated but is already on antibiotics to cover aspiration pneumonia.     -HOLD tube feeds for one hour and restart at 7pm at the 15ml/hr inidcated in his order and slowly titrate up to the goal of 60ml/hr  -Nutrition continues to follow and will adjust tube feeds tomorrow if electrolytes indicate any changes are needed    Vernell Hooker PA
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
## 2023-09-29 LAB
ALBUMIN SERPL BCG-MCNC: 2.9 G/DL (ref 3.5–5.2)
ALP SERPL-CCNC: 67 U/L (ref 40–129)
ALT SERPL W P-5'-P-CCNC: 22 U/L (ref 0–70)
ANION GAP SERPL CALCULATED.3IONS-SCNC: 13 MMOL/L (ref 7–15)
AST SERPL W P-5'-P-CCNC: 61 U/L (ref 0–45)
BACTERIA SPT CULT: ABNORMAL
BASOPHILS # BLD AUTO: 0.1 10E3/UL (ref 0–0.2)
BASOPHILS NFR BLD AUTO: 0 %
BILIRUB SERPL-MCNC: 0.5 MG/DL
BUN SERPL-MCNC: 14.7 MG/DL (ref 8–23)
CALCIUM SERPL-MCNC: 7.8 MG/DL (ref 8.8–10.2)
CHLORIDE SERPL-SCNC: 118 MMOL/L (ref 98–107)
CREAT SERPL-MCNC: 1.02 MG/DL (ref 0.67–1.17)
DEPRECATED HCO3 PLAS-SCNC: 27 MMOL/L (ref 22–29)
EGFRCR SERPLBLD CKD-EPI 2021: 84 ML/MIN/1.73M2
EOSINOPHIL # BLD AUTO: 0.1 10E3/UL (ref 0–0.7)
EOSINOPHIL NFR BLD AUTO: 0 %
ERYTHROCYTE [DISTWIDTH] IN BLOOD BY AUTOMATED COUNT: 15.2 % (ref 10–15)
GLUCOSE BLDC GLUCOMTR-MCNC: 102 MG/DL (ref 70–99)
GLUCOSE BLDC GLUCOMTR-MCNC: 115 MG/DL (ref 70–99)
GLUCOSE BLDC GLUCOMTR-MCNC: 119 MG/DL (ref 70–99)
GLUCOSE BLDC GLUCOMTR-MCNC: 133 MG/DL (ref 70–99)
GLUCOSE BLDC GLUCOMTR-MCNC: 135 MG/DL (ref 70–99)
GLUCOSE BLDC GLUCOMTR-MCNC: 66 MG/DL (ref 70–99)
GLUCOSE BLDC GLUCOMTR-MCNC: 70 MG/DL (ref 70–99)
GLUCOSE BLDC GLUCOMTR-MCNC: 78 MG/DL (ref 70–99)
GLUCOSE BLDC GLUCOMTR-MCNC: 89 MG/DL (ref 70–99)
GLUCOSE SERPL-MCNC: 100 MG/DL (ref 70–99)
HCT VFR BLD AUTO: 41.6 % (ref 40–53)
HGB BLD-MCNC: 13.2 G/DL (ref 13.3–17.7)
IMM GRANULOCYTES # BLD: 0.2 10E3/UL
IMM GRANULOCYTES NFR BLD: 1 %
LYMPHOCYTES # BLD AUTO: 1.7 10E3/UL (ref 0.8–5.3)
LYMPHOCYTES NFR BLD AUTO: 8 %
MAGNESIUM SERPL-MCNC: 2 MG/DL (ref 1.7–2.3)
MCH RBC QN AUTO: 29 PG (ref 26.5–33)
MCHC RBC AUTO-ENTMCNC: 31.7 G/DL (ref 31.5–36.5)
MCV RBC AUTO: 91 FL (ref 78–100)
MONOCYTES # BLD AUTO: 1.1 10E3/UL (ref 0–1.3)
MONOCYTES NFR BLD AUTO: 5 %
NEUTROPHILS # BLD AUTO: 19.4 10E3/UL (ref 1.6–8.3)
NEUTROPHILS NFR BLD AUTO: 86 %
NRBC # BLD AUTO: 0 10E3/UL
NRBC BLD AUTO-RTO: 0 /100
PHOSPHATE SERPL-MCNC: 2.2 MG/DL (ref 2.5–4.5)
PHOSPHATE SERPL-MCNC: 2.7 MG/DL (ref 2.5–4.5)
PLATELET # BLD AUTO: 144 10E3/UL (ref 150–450)
POTASSIUM SERPL-SCNC: 3.5 MMOL/L (ref 3.4–5.3)
PROT SERPL-MCNC: 5.9 G/DL (ref 6.4–8.3)
RBC # BLD AUTO: 4.55 10E6/UL (ref 4.4–5.9)
SODIUM SERPL-SCNC: 158 MMOL/L (ref 135–145)
VANCOMYCIN SERPL-MCNC: 22.5 UG/ML
WBC # BLD AUTO: 22.5 10E3/UL (ref 4–11)

## 2023-09-29 PROCEDURE — 250N000011 HC RX IP 250 OP 636

## 2023-09-29 PROCEDURE — 250N000013 HC RX MED GY IP 250 OP 250 PS 637: Performed by: STUDENT IN AN ORGANIZED HEALTH CARE EDUCATION/TRAINING PROGRAM

## 2023-09-29 PROCEDURE — 250N000011 HC RX IP 250 OP 636: Performed by: STUDENT IN AN ORGANIZED HEALTH CARE EDUCATION/TRAINING PROGRAM

## 2023-09-29 PROCEDURE — 250N000013 HC RX MED GY IP 250 OP 250 PS 637: Performed by: HOSPITALIST

## 2023-09-29 PROCEDURE — 250N000011 HC RX IP 250 OP 636: Mod: JZ | Performed by: INTERNAL MEDICINE

## 2023-09-29 PROCEDURE — 83735 ASSAY OF MAGNESIUM: CPT | Performed by: INTERNAL MEDICINE

## 2023-09-29 PROCEDURE — 36415 COLL VENOUS BLD VENIPUNCTURE: CPT | Performed by: STUDENT IN AN ORGANIZED HEALTH CARE EDUCATION/TRAINING PROGRAM

## 2023-09-29 PROCEDURE — 250N000011 HC RX IP 250 OP 636: Performed by: INTERNAL MEDICINE

## 2023-09-29 PROCEDURE — 250N000009 HC RX 250: Performed by: INTERNAL MEDICINE

## 2023-09-29 PROCEDURE — 80053 COMPREHEN METABOLIC PANEL: CPT | Performed by: STUDENT IN AN ORGANIZED HEALTH CARE EDUCATION/TRAINING PROGRAM

## 2023-09-29 PROCEDURE — 250N000013 HC RX MED GY IP 250 OP 250 PS 637: Performed by: PHYSICIAN ASSISTANT

## 2023-09-29 PROCEDURE — 84100 ASSAY OF PHOSPHORUS: CPT | Performed by: PHYSICIAN ASSISTANT

## 2023-09-29 PROCEDURE — 250N000013 HC RX MED GY IP 250 OP 250 PS 637: Performed by: INTERNAL MEDICINE

## 2023-09-29 PROCEDURE — 258N000003 HC RX IP 258 OP 636: Performed by: HOSPITALIST

## 2023-09-29 PROCEDURE — 258N000003 HC RX IP 258 OP 636

## 2023-09-29 PROCEDURE — 36415 COLL VENOUS BLD VENIPUNCTURE: CPT | Performed by: INTERNAL MEDICINE

## 2023-09-29 PROCEDURE — 120N000001 HC R&B MED SURG/OB

## 2023-09-29 PROCEDURE — 84100 ASSAY OF PHOSPHORUS: CPT | Performed by: INTERNAL MEDICINE

## 2023-09-29 PROCEDURE — 80202 ASSAY OF VANCOMYCIN: CPT | Performed by: HOSPITALIST

## 2023-09-29 PROCEDURE — 258N000001 HC RX 258: Performed by: STUDENT IN AN ORGANIZED HEALTH CARE EDUCATION/TRAINING PROGRAM

## 2023-09-29 PROCEDURE — 85025 COMPLETE CBC W/AUTO DIFF WBC: CPT | Performed by: STUDENT IN AN ORGANIZED HEALTH CARE EDUCATION/TRAINING PROGRAM

## 2023-09-29 PROCEDURE — 99233 SBSQ HOSP IP/OBS HIGH 50: CPT | Performed by: HOSPITALIST

## 2023-09-29 RX ORDER — CIPROFLOXACIN HYDROCHLORIDE 3.5 MG/ML
2 SOLUTION/ DROPS TOPICAL
Status: DISCONTINUED | OUTPATIENT
Start: 2023-09-29 | End: 2023-10-03 | Stop reason: HOSPADM

## 2023-09-29 RX ORDER — DEXTROSE MONOHYDRATE 50 MG/ML
INJECTION, SOLUTION INTRAVENOUS CONTINUOUS
Status: DISCONTINUED | OUTPATIENT
Start: 2023-09-29 | End: 2023-10-02

## 2023-09-29 RX ORDER — POTASSIUM CHLORIDE 1.5 G/1.58G
20 POWDER, FOR SOLUTION ORAL ONCE
Status: COMPLETED | OUTPATIENT
Start: 2023-09-29 | End: 2023-09-29

## 2023-09-29 RX ADMIN — PIPERACILLIN AND TAZOBACTAM 3.38 G: 3; .375 INJECTION, POWDER, FOR SOLUTION INTRAVENOUS at 10:08

## 2023-09-29 RX ADMIN — POTASSIUM CHLORIDE 20 MEQ: 1.5 POWDER, FOR SOLUTION ORAL at 12:57

## 2023-09-29 RX ADMIN — HYDROCORTISONE SODIUM SUCCINATE 50 MG: 100 INJECTION, POWDER, FOR SOLUTION INTRAMUSCULAR; INTRAVENOUS at 16:20

## 2023-09-29 RX ADMIN — CARBAMAZEPINE 150 MG: 100 SUSPENSION ORAL at 23:35

## 2023-09-29 RX ADMIN — HEPARIN SODIUM 5000 UNITS: 5000 INJECTION, SOLUTION INTRAVENOUS; SUBCUTANEOUS at 12:54

## 2023-09-29 RX ADMIN — HYDROCORTISONE SODIUM SUCCINATE 50 MG: 100 INJECTION, POWDER, FOR SOLUTION INTRAMUSCULAR; INTRAVENOUS at 04:37

## 2023-09-29 RX ADMIN — SODIUM BICARBONATE 650 MG TABLET 650 MG: at 09:54

## 2023-09-29 RX ADMIN — PIPERACILLIN AND TAZOBACTAM 3.38 G: 3; .375 INJECTION, POWDER, FOR SOLUTION INTRAVENOUS at 22:03

## 2023-09-29 RX ADMIN — CARBAMAZEPINE 150 MG: 100 SUSPENSION ORAL at 10:01

## 2023-09-29 RX ADMIN — Medication 20 MG: at 06:39

## 2023-09-29 RX ADMIN — CIPROFLOXACIN 2 DROP: 3 SOLUTION OPHTHALMIC at 20:09

## 2023-09-29 RX ADMIN — VANCOMYCIN HYDROCHLORIDE 750 MG: 1 INJECTION, POWDER, LYOPHILIZED, FOR SOLUTION INTRAVENOUS at 23:43

## 2023-09-29 RX ADMIN — BRIVARACETAM 100 MG: 10 SOLUTION ORAL at 10:04

## 2023-09-29 RX ADMIN — POTASSIUM & SODIUM PHOSPHATES POWDER PACK 280-160-250 MG 2 PACKET: 280-160-250 PACK at 06:39

## 2023-09-29 RX ADMIN — DEXTROSE MONOHYDRATE 25 ML: 25 INJECTION, SOLUTION INTRAVENOUS at 00:34

## 2023-09-29 RX ADMIN — BRIVARACETAM 100 MG: 10 SOLUTION ORAL at 23:36

## 2023-09-29 RX ADMIN — LEVOTHYROXINE SODIUM 150 MCG: 75 TABLET ORAL at 09:54

## 2023-09-29 RX ADMIN — ACETAMINOPHEN 650 MG: 325 SUSPENSION ORAL at 00:34

## 2023-09-29 RX ADMIN — HEPARIN SODIUM 5000 UNITS: 5000 INJECTION, SOLUTION INTRAVENOUS; SUBCUTANEOUS at 04:43

## 2023-09-29 RX ADMIN — HYDROMORPHONE HYDROCHLORIDE 0.5 MG: 1 INJECTION, SOLUTION INTRAMUSCULAR; INTRAVENOUS; SUBCUTANEOUS at 02:47

## 2023-09-29 RX ADMIN — HYDROMORPHONE HYDROCHLORIDE 0.5 MG: 1 INJECTION, SOLUTION INTRAMUSCULAR; INTRAVENOUS; SUBCUTANEOUS at 06:39

## 2023-09-29 RX ADMIN — POTASSIUM & SODIUM PHOSPHATES POWDER PACK 280-160-250 MG 2 PACKET: 280-160-250 PACK at 04:46

## 2023-09-29 RX ADMIN — VANCOMYCIN HYDROCHLORIDE 1000 MG: 1 INJECTION, SOLUTION INTRAVENOUS at 11:02

## 2023-09-29 RX ADMIN — POTASSIUM & SODIUM PHOSPHATES POWDER PACK 280-160-250 MG 2 PACKET: 280-160-250 PACK at 00:34

## 2023-09-29 RX ADMIN — CIPROFLOXACIN 2 DROP: 3 SOLUTION OPHTHALMIC at 16:49

## 2023-09-29 RX ADMIN — PIPERACILLIN AND TAZOBACTAM 3.38 G: 3; .375 INJECTION, POWDER, FOR SOLUTION INTRAVENOUS at 02:39

## 2023-09-29 RX ADMIN — DEXTROSE MONOHYDRATE: 50 INJECTION, SOLUTION INTRAVENOUS at 16:25

## 2023-09-29 RX ADMIN — POTASSIUM & SODIUM PHOSPHATES POWDER PACK 280-160-250 MG 2 PACKET: 280-160-250 PACK at 10:52

## 2023-09-29 RX ADMIN — CARBAMAZEPINE 150 MG: 100 SUSPENSION ORAL at 16:39

## 2023-09-29 RX ADMIN — PIPERACILLIN AND TAZOBACTAM 3.38 G: 3; .375 INJECTION, POWDER, FOR SOLUTION INTRAVENOUS at 16:34

## 2023-09-29 RX ADMIN — CARBAMAZEPINE 150 MG: 100 SUSPENSION ORAL at 00:48

## 2023-09-29 RX ADMIN — CARBAMAZEPINE 100 MG: 100 SUSPENSION ORAL at 18:42

## 2023-09-29 RX ADMIN — POTASSIUM & SODIUM PHOSPHATES POWDER PACK 280-160-250 MG 2 PACKET: 280-160-250 PACK at 16:40

## 2023-09-29 RX ADMIN — MULTIVITAMIN TABLET 1 TABLET: TABLET at 09:54

## 2023-09-29 RX ADMIN — HEPARIN SODIUM 5000 UNITS: 5000 INJECTION, SOLUTION INTRAVENOUS; SUBCUTANEOUS at 20:09

## 2023-09-29 ASSESSMENT — ACTIVITIES OF DAILY LIVING (ADL)
ADLS_ACUITY_SCORE: 51
ADLS_ACUITY_SCORE: 55
ADLS_ACUITY_SCORE: 57
ADLS_ACUITY_SCORE: 51
ADLS_ACUITY_SCORE: 57
ADLS_ACUITY_SCORE: 57
ADLS_ACUITY_SCORE: 55
ADLS_ACUITY_SCORE: 53
ADLS_ACUITY_SCORE: 53
ADLS_ACUITY_SCORE: 55

## 2023-09-29 NOTE — PLAN OF CARE
Goal Outcome Evaluation:                 Outcome Evaluation: Diet: NPO.  Plan to put pt back on his home regimen (extubated and off pressors).  Jevity 1.5 @ 55 mL/hr (x22 hrs - hold for 1 hr before and 1 hr after synthroid dose) +1 packet [id number].  Pt had GJ tube (be sure to feed into J-port).  Restart formula at 25 mL/hr.  Increase by 15 mL every 12 hrs to goal rate.

## 2023-09-29 NOTE — PROGRESS NOTES
"CLINICAL NUTRITION SERVICES - REASSESSMENT NOTE      Recommendations Ordered by Registered Dietitian (RD):     Will plan to put pt back on his home TF regimen:  Type of Feeding Tube: GJ tube - FEED INTO J-PORT  Enteral Frequency:  Continuous  Enteral Regimen: Jevity 1.5 @ 55 mL/hr (x22 hrs - hold for 1 hr before and 1 hr after synthroid dose)   +1 packet [id number]  Total Enteral Provisions: 1895 cals (27 cals/kg), 97 gm pro (1.4 gm/kg), 21 gm fiber, 920 mL free water  Free Water Flush: 100 mL every 4 hrs    Start new formula at 25 mL/hr.  Increase by 15 mL every 12 hrs to goal rate of 55 mL/hr.    Will discontinue the Expedite modular       Malnutrition: (9/26)  % Weight Loss:  None noted (within UBW range)  % Intake:  Decreased intake does not meet criteria for malnutrition (home TF)  Subcutaneous Fat Loss:  None observed  Muscle Loss:  None observed  Fluid Retention:  None noted     Malnutrition Diagnosis: Patient does not meet two of the above criteria necessary for diagnosing malnutrition       EVALUATION OF PROGRESS TOWARD GOALS   Diet:    NPO    Nutrition Support:    Type of Feeding Tube: GJ tube (feeding should be into the J-port)  Enteral Frequency:  Continuous  Enteral Regimen: Osmolite 1.5 at 60 mL/hr x 22 hours per day (hold TF 1 hour before and 1 hour after Synthroid administration) = 1980 kcal, 83 g protein (1.2 g/kg), 269 g CHO, 0 g fiber, 1006 mL H2O   Expedite one bottle per day = 100 kcal and 10 g protein   Free Water Flush: 100 mL every 4 hrs    Intake/Tolerance:    Chart reviewed    Note pt with loose stools - \"loose, watery brown\"    +emesis x2 yesterday - \"large and TF tan in color, second episode of emesis was small and bile green in color\"    Pt had been running TF at goal rate of 60 mL/hr yesterday morning  TF put on hold for a few hours yesterday and then restarted  15 mL/hr this morning    Meds:   Synthroid once daily  Multivitamin w/minerals once daily  Roque Smith    9/29: Na 158 " (H)           K 3.5           Mg 2.0           Phos 2.7      ASSESSED NUTRITION NEEDS:  Dosing Weight 70.4 kg (9/26)  Estimated Energy Needs: 5517-4725 kcals (25-30 Kcal/Kg)  Justification: maintenance  Estimated Protein Needs:  grams protein (1.2-1.5 g pro/Kg)  Justification: hypercatabolism with acute illness      NEW FINDINGS:     Usual wt 150s-160s  Wt up 8# from yesterday??  09/29/23 0456 79 kg (174 lb 2.6 oz) --   09/28/23 0500 75.3 kg (166 lb 0.1 oz) Bed scale   09/27/23 0400 72.2 kg (159 lb 2.8 oz) Bed scale   09/26/23 0600 70.4 kg (155 lb 3.3 oz) Bed scale       Previous Goals (9/26):   Goal TF + Propofol will meet % needs    Evaluation: no longer applies as pt extubated and not on propofol    Previous Nutrition Diagnosis (9/26):   Inadequate enteral nutrition infusion related to NPO with plans to resume TF today as evidenced by meeting 0% protein and 14% energy needs from Propofol    Evaluation: No change, modified below      CURRENT NUTRITION DIAGNOSIS  Inadequate enteral nutrition infusion related to episodes of emesis yesterday as evidenced by TF put on hold yesterday and restarted at low rate    INTERVENTIONS  Recommendations / Nutrition Prescription  NPO    Will plan to put pt back on his home TF regimen:  Type of Feeding Tube: GJ tube - FEED INTO J_PORT  Enteral Frequency:  Continuous  Enteral Regimen: Jevity 1.5 @ 55 mL/hr (x22 hrs - hold for 1 hr before and 1 hr after synthroid dose)   +1 packet [id number]  Total Enteral Provisions: 1895 cals (27 cals/kg), 97 gm pro (1.4 gm/kg), 21 gm fiber, 920 mL free water  Free Water Flush: 100 mL every 4 hrs    Start new formula at 25 mL/hr.  Increase by 15 mL every 12 hrs to goal rate of 55 mL/hr.    Will discontinue the Expedite modular        Goals  Pt to tolerate home EN regimen with decrease in stooling      MONITORING AND EVALUATION:  Progress towards goals will be monitored and evaluated per protocol and Practice Guidelines

## 2023-09-29 NOTE — PLAN OF CARE
Goal Outcome Evaluation:    0700-1930  Orientation: HECTOR. Pt is nonverbal baseline  Activity: Ax2/ Lift/ T/R Q2H  Diet/BS Checks: NPO. On TF-Jevity, currently running at 25 mL/hr. Plan to advance rate at 1:30 AM. Goal rate 55 mL/hr  Tele: N/A  IV Access/Drains: L PIV with D5% continuously infsuing @ 100 ml/hr.  G-J tube  Pain Management: No s/sx of pain noted.  Has IV dilaudid and PO Tylenol available  Abnormal VS/Results: On K, Mg, and P protocol. BG 70 @ 1600 and 89 @ 1735, started on continuous IV Dextrose 5%. Afebrile, tachy with HR-101  Bowel/Bladder: Lerma in place. Incontinent of bowel. Loose BMs x2-3  Skin/Wounds: Blanchable redness to heels; elevated on pillows. Incontinence-associated dermatitis to buttocks/coccyx.  Perianal cares with Rita cleanser and wipes.   Consults: Nutrition, SW, WOC  D/C Disposition: Plan pending  Other Info: Contact precautions for MRSA and VRE. Sitter at bedside. Has left mitt and left wrist restraint as pt still tries to pull at lines.  Visited by mom.  On IV ABX.

## 2023-09-29 NOTE — PROGRESS NOTES
Pt alert, intermittently follows commands,  Nonverbal at baseline.  Tube feeds restarted at 10, increased to 20.  Loose watery stools.  Lerma in place for wound healing.  Pt remains on 1:1 bedside sitter due to impulsiveness and line pulling.  Blood glucose levels low.  Dextrose given.
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
## 2023-09-29 NOTE — PLAN OF CARE
[id number]  Orientation: HECTOR. Pt is nonverbal baseline  Activity: Ax2/ Lift/ T/R Q2H  Diet/BS Checks: NPO. On TF, currently running at 15mL/hr. Plan to advance rate at 10a. Goal rate 60mL/hr  Tele: N/A  IV Access/Drains: L PIV SL'd. G-J tube  Pain Management: IV dilaudid   Abnormal VS/Results: On K, Mg, and P protocol. BG 66 @ 00:30, PRN dextrose given. Tmax 101.1, PRN tylenol given  Bowel/Bladder: Lerma in place. Incontinent of bowel.   Skin/Wounds: DTPI to buttocks/coccyx. Blanchable redness to heels. Incontinence-associated dermatitis.   Consults: Nutrition, SW, WOC  D/C Disposition: Plan pending  Other Info: Contact precautions for MRSA and VRE. Sitter at bedside.

## 2023-09-29 NOTE — PROVIDER NOTIFICATION
MD Notification    Notified Person: MD    Notified Person Name: Nicolas    Notification Date/Time: 09/29/23 2:57 AM    Notification Interaction: Bei[employer] messaging    Purpose of Notification: Pt is having frequent loose stool and his bottom is very raw/painful. Would a rectal tube be a good option?    Orders Received:    Comments:

## 2023-09-30 LAB
ALBUMIN SERPL BCG-MCNC: 2.2 G/DL (ref 3.5–5.2)
ALP SERPL-CCNC: 58 U/L (ref 40–129)
ALT SERPL W P-5'-P-CCNC: 16 U/L (ref 0–70)
ANION GAP SERPL CALCULATED.3IONS-SCNC: 9 MMOL/L (ref 7–15)
AST SERPL W P-5'-P-CCNC: 35 U/L (ref 0–45)
BACTERIA BLD CULT: NO GROWTH
BACTERIA BLD CULT: NO GROWTH
BASOPHILS # BLD AUTO: 0.1 10E3/UL (ref 0–0.2)
BASOPHILS NFR BLD AUTO: 1 %
BILIRUB SERPL-MCNC: 0.3 MG/DL
BUN SERPL-MCNC: 17.1 MG/DL (ref 8–23)
CALCIUM SERPL-MCNC: 7.6 MG/DL (ref 8.8–10.2)
CHLORIDE SERPL-SCNC: 120 MMOL/L (ref 98–107)
CREAT SERPL-MCNC: 0.94 MG/DL (ref 0.67–1.17)
DEPRECATED HCO3 PLAS-SCNC: 24 MMOL/L (ref 22–29)
EGFRCR SERPLBLD CKD-EPI 2021: >90 ML/MIN/1.73M2
EOSINOPHIL # BLD AUTO: 0.1 10E3/UL (ref 0–0.7)
EOSINOPHIL NFR BLD AUTO: 1 %
ERYTHROCYTE [DISTWIDTH] IN BLOOD BY AUTOMATED COUNT: 15.3 % (ref 10–15)
GLUCOSE BLDC GLUCOMTR-MCNC: 114 MG/DL (ref 70–99)
GLUCOSE BLDC GLUCOMTR-MCNC: 117 MG/DL (ref 70–99)
GLUCOSE BLDC GLUCOMTR-MCNC: 125 MG/DL (ref 70–99)
GLUCOSE BLDC GLUCOMTR-MCNC: 159 MG/DL (ref 70–99)
GLUCOSE BLDC GLUCOMTR-MCNC: 174 MG/DL (ref 70–99)
GLUCOSE BLDC GLUCOMTR-MCNC: 179 MG/DL (ref 70–99)
GLUCOSE SERPL-MCNC: 178 MG/DL (ref 70–99)
HCT VFR BLD AUTO: 37.5 % (ref 40–53)
HGB BLD-MCNC: 11.7 G/DL (ref 13.3–17.7)
IMM GRANULOCYTES # BLD: 0.1 10E3/UL
IMM GRANULOCYTES NFR BLD: 1 %
LYMPHOCYTES # BLD AUTO: 1 10E3/UL (ref 0.8–5.3)
LYMPHOCYTES NFR BLD AUTO: 10 %
MAGNESIUM SERPL-MCNC: 1.9 MG/DL (ref 1.7–2.3)
MCH RBC QN AUTO: 29.1 PG (ref 26.5–33)
MCHC RBC AUTO-ENTMCNC: 31.2 G/DL (ref 31.5–36.5)
MCV RBC AUTO: 93 FL (ref 78–100)
MONOCYTES # BLD AUTO: 0.6 10E3/UL (ref 0–1.3)
MONOCYTES NFR BLD AUTO: 6 %
NEUTROPHILS # BLD AUTO: 8.9 10E3/UL (ref 1.6–8.3)
NEUTROPHILS NFR BLD AUTO: 81 %
NRBC # BLD AUTO: 0 10E3/UL
NRBC BLD AUTO-RTO: 0 /100
PHOSPHATE SERPL-MCNC: 1.9 MG/DL (ref 2.5–4.5)
PLATELET # BLD AUTO: 118 10E3/UL (ref 150–450)
POTASSIUM SERPL-SCNC: 3.4 MMOL/L (ref 3.4–5.3)
PROT SERPL-MCNC: 5.6 G/DL (ref 6.4–8.3)
RBC # BLD AUTO: 4.02 10E6/UL (ref 4.4–5.9)
SODIUM SERPL-SCNC: 153 MMOL/L (ref 135–145)
WBC # BLD AUTO: 10.8 10E3/UL (ref 4–11)

## 2023-09-30 PROCEDURE — 250N000011 HC RX IP 250 OP 636: Performed by: STUDENT IN AN ORGANIZED HEALTH CARE EDUCATION/TRAINING PROGRAM

## 2023-09-30 PROCEDURE — 84100 ASSAY OF PHOSPHORUS: CPT | Performed by: INTERNAL MEDICINE

## 2023-09-30 PROCEDURE — 120N000001 HC R&B MED SURG/OB

## 2023-09-30 PROCEDURE — 36415 COLL VENOUS BLD VENIPUNCTURE: CPT | Performed by: STUDENT IN AN ORGANIZED HEALTH CARE EDUCATION/TRAINING PROGRAM

## 2023-09-30 PROCEDURE — 250N000011 HC RX IP 250 OP 636: Performed by: HOSPITALIST

## 2023-09-30 PROCEDURE — 258N000003 HC RX IP 258 OP 636: Performed by: HOSPITALIST

## 2023-09-30 PROCEDURE — 99233 SBSQ HOSP IP/OBS HIGH 50: CPT | Performed by: HOSPITALIST

## 2023-09-30 PROCEDURE — 250N000013 HC RX MED GY IP 250 OP 250 PS 637: Performed by: STUDENT IN AN ORGANIZED HEALTH CARE EDUCATION/TRAINING PROGRAM

## 2023-09-30 PROCEDURE — 83735 ASSAY OF MAGNESIUM: CPT | Performed by: INTERNAL MEDICINE

## 2023-09-30 PROCEDURE — 250N000013 HC RX MED GY IP 250 OP 250 PS 637: Performed by: INTERNAL MEDICINE

## 2023-09-30 PROCEDURE — 258N000003 HC RX IP 258 OP 636

## 2023-09-30 PROCEDURE — 250N000011 HC RX IP 250 OP 636

## 2023-09-30 PROCEDURE — 250N000013 HC RX MED GY IP 250 OP 250 PS 637: Performed by: HOSPITALIST

## 2023-09-30 PROCEDURE — 85025 COMPLETE CBC W/AUTO DIFF WBC: CPT | Performed by: STUDENT IN AN ORGANIZED HEALTH CARE EDUCATION/TRAINING PROGRAM

## 2023-09-30 PROCEDURE — 80053 COMPREHEN METABOLIC PANEL: CPT | Performed by: STUDENT IN AN ORGANIZED HEALTH CARE EDUCATION/TRAINING PROGRAM

## 2023-09-30 PROCEDURE — 250N000011 HC RX IP 250 OP 636: Mod: JZ | Performed by: INTERNAL MEDICINE

## 2023-09-30 PROCEDURE — 250N000009 HC RX 250: Performed by: INTERNAL MEDICINE

## 2023-09-30 RX ORDER — CIPROFLOXACIN 2 MG/ML
400 INJECTION, SOLUTION INTRAVENOUS EVERY 12 HOURS
Status: DISCONTINUED | OUTPATIENT
Start: 2023-09-30 | End: 2023-10-03

## 2023-09-30 RX ADMIN — HEPARIN SODIUM 5000 UNITS: 5000 INJECTION, SOLUTION INTRAVENOUS; SUBCUTANEOUS at 04:16

## 2023-09-30 RX ADMIN — Medication 20 MG: at 06:41

## 2023-09-30 RX ADMIN — MICONAZOLE NITRATE: 2 POWDER TOPICAL at 20:36

## 2023-09-30 RX ADMIN — CARBAMAZEPINE 150 MG: 100 SUSPENSION ORAL at 11:19

## 2023-09-30 RX ADMIN — BRIVARACETAM 100 MG: 10 SOLUTION ORAL at 21:30

## 2023-09-30 RX ADMIN — CIPROFLOXACIN 2 DROP: 3 SOLUTION OPHTHALMIC at 20:34

## 2023-09-30 RX ADMIN — CIPROFLOXACIN 2 DROP: 3 SOLUTION OPHTHALMIC at 17:27

## 2023-09-30 RX ADMIN — CARBAMAZEPINE 100 MG: 100 SUSPENSION ORAL at 17:46

## 2023-09-30 RX ADMIN — HYDROCORTISONE SODIUM SUCCINATE 50 MG: 100 INJECTION, POWDER, FOR SOLUTION INTRAMUSCULAR; INTRAVENOUS at 04:21

## 2023-09-30 RX ADMIN — VANCOMYCIN HYDROCHLORIDE 750 MG: 1 INJECTION, POWDER, LYOPHILIZED, FOR SOLUTION INTRAVENOUS at 12:28

## 2023-09-30 RX ADMIN — MULTIVITAMIN TABLET 1 TABLET: TABLET at 09:46

## 2023-09-30 RX ADMIN — HEPARIN SODIUM 5000 UNITS: 5000 INJECTION, SOLUTION INTRAVENOUS; SUBCUTANEOUS at 20:34

## 2023-09-30 RX ADMIN — CIPROFLOXACIN 2 DROP: 3 SOLUTION OPHTHALMIC at 11:18

## 2023-09-30 RX ADMIN — DEXTROSE MONOHYDRATE: 50 INJECTION, SOLUTION INTRAVENOUS at 05:48

## 2023-09-30 RX ADMIN — CIPROFLOXACIN 400 MG: 2 INJECTION, SOLUTION INTRAVENOUS at 17:26

## 2023-09-30 RX ADMIN — LEVOTHYROXINE SODIUM 150 MCG: 75 TABLET ORAL at 09:47

## 2023-09-30 RX ADMIN — MICONAZOLE NITRATE: 2 POWDER TOPICAL at 10:06

## 2023-09-30 RX ADMIN — PIPERACILLIN AND TAZOBACTAM 3.38 G: 3; .375 INJECTION, POWDER, FOR SOLUTION INTRAVENOUS at 04:16

## 2023-09-30 RX ADMIN — HEPARIN SODIUM 5000 UNITS: 5000 INJECTION, SOLUTION INTRAVENOUS; SUBCUTANEOUS at 12:28

## 2023-09-30 RX ADMIN — CARBAMAZEPINE 150 MG: 100 SUSPENSION ORAL at 06:41

## 2023-09-30 RX ADMIN — PIPERACILLIN AND TAZOBACTAM 3.38 G: 3; .375 INJECTION, POWDER, FOR SOLUTION INTRAVENOUS at 09:44

## 2023-09-30 RX ADMIN — HYDROCORTISONE SODIUM SUCCINATE 50 MG: 100 INJECTION, POWDER, FOR SOLUTION INTRAMUSCULAR; INTRAVENOUS at 16:48

## 2023-09-30 RX ADMIN — BRIVARACETAM 100 MG: 10 SOLUTION ORAL at 10:04

## 2023-09-30 ASSESSMENT — ACTIVITIES OF DAILY LIVING (ADL)
ADLS_ACUITY_SCORE: 53

## 2023-09-30 NOTE — PROVIDER NOTIFICATION
"MD Notification    Notified Person: MD    Notified Person Name: Dr. Regino Motley    Notification Date/Time: 9/30/23 @ 0330    Notification Interaction: PulseOn TextPage    Purpose of Notification:    \"Rash to pt's memo area. WOC note states anti-fungal powder, but none ordered. Please order when you can.\"    Orders Received: Miconazole Powder 2x daily, starting at 9am    Comments:   "

## 2023-09-30 NOTE — PHARMACY-VANCOMYCIN DOSING SERVICE
"Pharmacy Vancomycin Note  Date of Service 2023  Patient's  1960   63 year old, male    Indication: Sepsis  Day of Therapy: 5  Current vancomycin regimen:  1000 mg IV q12h  Current vancomycin monitoring method: AUC  Current vancomycin therapeutic monitoring goal: 400-600 mg*h/L    Insight[employer] Prediction of Current Vancomycin Regimen  Loading dose: 1750  Regimen: 1000 mg IV every 12 hours.  Start time: 23:02 on 2023  Exposure target: AUC24 (range)400-600 mg/L.hr   AUC24,ss: 628 mg/L.hr  Probability of AUC24 > 400: 100 %  Ctrough,ss: 21.6 mg/L  Probability of Ctrough,ss > 20: 65 %  Probability of nephrotoxicity (Lodise ERNESTO ): 20 %      Current estimated CrCl = Estimated Creatinine Clearance: 75.2 mL/min (based on SCr of 1.02 mg/dL).    Creatinine for last 3 days  2023:  4:14 AM Creatinine 0.95 mg/dL  2023:  4:35 AM Creatinine 0.82 mg/dL  2023:  7:54 AM Creatinine 1.02 mg/dL    Recent Vancomycin Levels (past 3 days)  2023:  4:14 AM Vancomycin 16.7 ug/mL  2023:  7:46 PM Vancomycin 22.5 ug/mL    Vancomycin IV Administrations (past 72 hours)                     vancomycin (VANCOCIN) 1,000 mg in 200 mL dextrose intermittent infusion (mg) 1,000 mg New Bag 23 1102     1,000 mg New Bag 23 2113     1,000 mg New Bag  0949     1,000 mg New Bag 23 2221     1,000 mg New Bag  0916                    Nephrotoxins and other renal medications (From now, onward)      Start     Dose/Rate Route Frequency Ordered Stop    23 0900  vancomycin (VANCOCIN) 1,000 mg in 200 mL dextrose intermittent infusion         1,000 mg  200 mL/hr over 1 Hours Intravenous EVERY 12 HOURS 23 0734      23 0200  piperacillin-tazobactam (ZOSYN) 3.375 g vial to attach to  mL bag        Note to Pharmacy: For [facility], [facility] and [facility]: For Zosyn-naive patients, use the \"Zosyn initial dose + extended infusion\" order panel.    3.375 g  over 30 Minutes Intravenous EVERY 6 HOURS " 09/25/23 2228                 Contrast Orders - past 72 hours (72h ago, onward)      None            Interpretation of levels and current regimen:  Vancomycin level is reflective of AUC greater than 600    Has serum creatinine changed greater than 50% in last 72 hours: No    Urine output:  unable to determine    Renal Function: Stable    InsightRX Prediction of Planned New Vancomycin Regimen  Regimen: 750 mg IV every 12 hours.  Start time: 23:02 on 09/29/2023  Exposure target: AUC24 (range)400-600 mg/L.hr   AUC24,ss: 483 mg/L.hr  Probability of AUC24 > 400: 88 %  Ctrough,ss: 16.6 mg/L  Probability of Ctrough,ss > 20: 18 %  Probability of nephrotoxicity (Lodise ERNESTO 2009): 12 %      Plan:  Decrease Dose to 750 mg q12  Vancomycin monitoring method: AUC  Vancomycin therapeutic monitoring goal: 400-600 mg*h/L  Pharmacy will check vancomycin levels as appropriate in 1-3 Days.  Serum creatinine levels will be ordered a minimum of twice weekly.    Adarsh Washington, [facility]

## 2023-09-30 NOTE — PLAN OF CARE
Alert to self and family, calm and pleasant, inconsistent in following commands, Q2 T and Repo, gluteal wound dressing change today, Lerma in use, with 1 small smear this shift, Tube feeding rate increased to 55, will stay over the weekend and plan to discharge to home with mom, next week. Continue to monitor.

## 2023-09-30 NOTE — PROVIDER NOTIFICATION
MD Notification    Notified Person: MD    Notified Person Name: Dr. Leroy    Notification Date/Time: 9/29/23 @ 9:41pm and then at 9:51pm    Notification Interaction:  FYDONIS updated culture results Sindy Esquivel, [phone]    Also, pt has sodium bicarb po ordered. Sodium is 158. Would you like us to give this still?    Purpose of Notification:     Orders Received: Acknowledged Cultures and Sodium Bicarbonate Held    Comments:
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
## 2023-09-30 NOTE — PLAN OF CARE
Summary: Septic Shock secondary to pneumonia  Acute hypoxic respiratory failure       9/29/23 @ 1900- 9/30/23 0730  Orientation: HECTOR. Pt is nonverbal baseline  Activity: Ax2/ Lift/ T/R Q2H  Diet/BS Checks: NPO. On TF, increased from 25 mL/hr to 40 mL/hr at 0130. Next increase due at 1330, with goal of 55 mL/hr  Tele: N/A  IV Access/Drains: L PIV- D5% continuously infusing @ 100 ml/hr; G-J tube  Pain Management: none  Abnormal VS/Results: On K, Mg, and P protocol. ,133,117; Insulin Held; Afebrile, Na+ 158 so Sodium Bicarbonate Held.   Bowel/Bladder: Lerma in place. Incontinent of B/B  Skin/Wounds: DTPI to buttocks/coccyx. Mepelex changed. Blanchable redness to heels. Incontinence-associated dermatitis. Rash to perineum-miconazole ordered  Consults: Nutrition, SW, WOC  D/C Disposition: Plan pending  Other Info: Contact precautions for MRSA and VRE. Sitter at bedside.   Restraints Discontinued at 0200; Mitts still in place.

## 2023-10-01 LAB
ALBUMIN SERPL BCG-MCNC: 2.8 G/DL (ref 3.5–5.2)
ALP SERPL-CCNC: 65 U/L (ref 40–129)
ALT SERPL W P-5'-P-CCNC: 19 U/L (ref 0–70)
ANION GAP SERPL CALCULATED.3IONS-SCNC: 10 MMOL/L (ref 7–15)
AST SERPL W P-5'-P-CCNC: 32 U/L (ref 0–45)
BACTERIA SPEC CULT: ABNORMAL
BASOPHILS # BLD AUTO: 0 10E3/UL (ref 0–0.2)
BASOPHILS NFR BLD AUTO: 0 %
BILIRUB SERPL-MCNC: 0.3 MG/DL
BUN SERPL-MCNC: 19.7 MG/DL (ref 8–23)
CALCIUM SERPL-MCNC: 7.8 MG/DL (ref 8.8–10.2)
CHLORIDE SERPL-SCNC: 112 MMOL/L (ref 98–107)
CREAT SERPL-MCNC: 0.75 MG/DL (ref 0.67–1.17)
DEPRECATED HCO3 PLAS-SCNC: 23 MMOL/L (ref 22–29)
EGFRCR SERPLBLD CKD-EPI 2021: >90 ML/MIN/1.73M2
EOSINOPHIL # BLD AUTO: 0.2 10E3/UL (ref 0–0.7)
EOSINOPHIL NFR BLD AUTO: 2 %
ERYTHROCYTE [DISTWIDTH] IN BLOOD BY AUTOMATED COUNT: 14.7 % (ref 10–15)
GLUCOSE BLDC GLUCOMTR-MCNC: 101 MG/DL (ref 70–99)
GLUCOSE BLDC GLUCOMTR-MCNC: 125 MG/DL (ref 70–99)
GLUCOSE BLDC GLUCOMTR-MCNC: 142 MG/DL (ref 70–99)
GLUCOSE BLDC GLUCOMTR-MCNC: 148 MG/DL (ref 70–99)
GLUCOSE BLDC GLUCOMTR-MCNC: 157 MG/DL (ref 70–99)
GLUCOSE BLDC GLUCOMTR-MCNC: 157 MG/DL (ref 70–99)
GLUCOSE BLDC GLUCOMTR-MCNC: 84 MG/DL (ref 70–99)
GLUCOSE SERPL-MCNC: 165 MG/DL (ref 70–99)
HCT VFR BLD AUTO: 37.2 % (ref 40–53)
HGB BLD-MCNC: 11.8 G/DL (ref 13.3–17.7)
IMM GRANULOCYTES # BLD: 0.1 10E3/UL
IMM GRANULOCYTES NFR BLD: 1 %
LYMPHOCYTES # BLD AUTO: 1.1 10E3/UL (ref 0.8–5.3)
LYMPHOCYTES NFR BLD AUTO: 13 %
MAGNESIUM SERPL-MCNC: 1.7 MG/DL (ref 1.7–2.3)
MCH RBC QN AUTO: 29 PG (ref 26.5–33)
MCHC RBC AUTO-ENTMCNC: 31.7 G/DL (ref 31.5–36.5)
MCV RBC AUTO: 91 FL (ref 78–100)
MONOCYTES # BLD AUTO: 0.4 10E3/UL (ref 0–1.3)
MONOCYTES NFR BLD AUTO: 5 %
NEUTROPHILS # BLD AUTO: 6.7 10E3/UL (ref 1.6–8.3)
NEUTROPHILS NFR BLD AUTO: 79 %
NRBC # BLD AUTO: 0 10E3/UL
NRBC BLD AUTO-RTO: 0 /100
PHOSPHATE SERPL-MCNC: 1.5 MG/DL (ref 2.5–4.5)
PLATELET # BLD AUTO: 121 10E3/UL (ref 150–450)
POTASSIUM SERPL-SCNC: 3.7 MMOL/L (ref 3.4–5.3)
PROT SERPL-MCNC: 6.1 G/DL (ref 6.4–8.3)
RBC # BLD AUTO: 4.07 10E6/UL (ref 4.4–5.9)
SODIUM SERPL-SCNC: 145 MMOL/L (ref 135–145)
WBC # BLD AUTO: 8.5 10E3/UL (ref 4–11)

## 2023-10-01 PROCEDURE — 258N000003 HC RX IP 258 OP 636

## 2023-10-01 PROCEDURE — 250N000011 HC RX IP 250 OP 636

## 2023-10-01 PROCEDURE — 250N000011 HC RX IP 250 OP 636: Mod: JZ | Performed by: HOSPITALIST

## 2023-10-01 PROCEDURE — 258N000003 HC RX IP 258 OP 636: Performed by: HOSPITALIST

## 2023-10-01 PROCEDURE — 250N000013 HC RX MED GY IP 250 OP 250 PS 637: Performed by: INTERNAL MEDICINE

## 2023-10-01 PROCEDURE — 250N000011 HC RX IP 250 OP 636: Mod: JZ | Performed by: INTERNAL MEDICINE

## 2023-10-01 PROCEDURE — 83735 ASSAY OF MAGNESIUM: CPT | Performed by: INTERNAL MEDICINE

## 2023-10-01 PROCEDURE — 120N000001 HC R&B MED SURG/OB

## 2023-10-01 PROCEDURE — 80053 COMPREHEN METABOLIC PANEL: CPT | Performed by: STUDENT IN AN ORGANIZED HEALTH CARE EDUCATION/TRAINING PROGRAM

## 2023-10-01 PROCEDURE — 85025 COMPLETE CBC W/AUTO DIFF WBC: CPT | Performed by: STUDENT IN AN ORGANIZED HEALTH CARE EDUCATION/TRAINING PROGRAM

## 2023-10-01 PROCEDURE — 250N000013 HC RX MED GY IP 250 OP 250 PS 637: Performed by: STUDENT IN AN ORGANIZED HEALTH CARE EDUCATION/TRAINING PROGRAM

## 2023-10-01 PROCEDURE — 99233 SBSQ HOSP IP/OBS HIGH 50: CPT | Performed by: HOSPITALIST

## 2023-10-01 PROCEDURE — 250N000013 HC RX MED GY IP 250 OP 250 PS 637: Performed by: HOSPITALIST

## 2023-10-01 PROCEDURE — 36415 COLL VENOUS BLD VENIPUNCTURE: CPT | Performed by: STUDENT IN AN ORGANIZED HEALTH CARE EDUCATION/TRAINING PROGRAM

## 2023-10-01 PROCEDURE — 999N000128 HC STATISTIC PERIPHERAL IV START W/O US GUIDANCE

## 2023-10-01 PROCEDURE — 250N000011 HC RX IP 250 OP 636: Performed by: STUDENT IN AN ORGANIZED HEALTH CARE EDUCATION/TRAINING PROGRAM

## 2023-10-01 PROCEDURE — 84100 ASSAY OF PHOSPHORUS: CPT | Performed by: INTERNAL MEDICINE

## 2023-10-01 RX ADMIN — CIPROFLOXACIN 2 DROP: 3 SOLUTION OPHTHALMIC at 08:37

## 2023-10-01 RX ADMIN — Medication 20 MG: at 06:30

## 2023-10-01 RX ADMIN — HYDROCORTISONE SODIUM SUCCINATE 50 MG: 100 INJECTION, POWDER, FOR SOLUTION INTRAMUSCULAR; INTRAVENOUS at 08:35

## 2023-10-01 RX ADMIN — CARBAMAZEPINE 150 MG: 100 SUSPENSION ORAL at 12:15

## 2023-10-01 RX ADMIN — CIPROFLOXACIN 2 DROP: 3 SOLUTION OPHTHALMIC at 12:16

## 2023-10-01 RX ADMIN — CIPROFLOXACIN 400 MG: 2 INJECTION, SOLUTION INTRAVENOUS at 04:03

## 2023-10-01 RX ADMIN — HEPARIN SODIUM 5000 UNITS: 5000 INJECTION, SOLUTION INTRAVENOUS; SUBCUTANEOUS at 04:04

## 2023-10-01 RX ADMIN — VANCOMYCIN HYDROCHLORIDE 750 MG: 1 INJECTION, POWDER, LYOPHILIZED, FOR SOLUTION INTRAVENOUS at 00:43

## 2023-10-01 RX ADMIN — DEXTROSE MONOHYDRATE: 50 INJECTION, SOLUTION INTRAVENOUS at 18:54

## 2023-10-01 RX ADMIN — LEVOTHYROXINE SODIUM 150 MCG: 75 TABLET ORAL at 08:35

## 2023-10-01 RX ADMIN — HEPARIN SODIUM 5000 UNITS: 5000 INJECTION, SOLUTION INTRAVENOUS; SUBCUTANEOUS at 12:15

## 2023-10-01 RX ADMIN — CIPROFLOXACIN 2 DROP: 3 SOLUTION OPHTHALMIC at 17:33

## 2023-10-01 RX ADMIN — HYDROCORTISONE SODIUM SUCCINATE 25 MG: 100 INJECTION, POWDER, FOR SOLUTION INTRAMUSCULAR; INTRAVENOUS at 21:23

## 2023-10-01 RX ADMIN — CARBAMAZEPINE 100 MG: 100 SUSPENSION ORAL at 17:46

## 2023-10-01 RX ADMIN — POTASSIUM & SODIUM PHOSPHATES POWDER PACK 280-160-250 MG 2 PACKET: 280-160-250 PACK at 17:33

## 2023-10-01 RX ADMIN — ONDANSETRON 4 MG: 2 INJECTION INTRAMUSCULAR; INTRAVENOUS at 21:22

## 2023-10-01 RX ADMIN — MULTIVITAMIN TABLET 1 TABLET: TABLET at 08:35

## 2023-10-01 RX ADMIN — MICONAZOLE NITRATE: 2 POWDER TOPICAL at 08:37

## 2023-10-01 RX ADMIN — MICONAZOLE NITRATE: 2 POWDER TOPICAL at 21:23

## 2023-10-01 RX ADMIN — CARBAMAZEPINE 150 MG: 100 SUSPENSION ORAL at 23:59

## 2023-10-01 RX ADMIN — HEPARIN SODIUM 5000 UNITS: 5000 INJECTION, SOLUTION INTRAVENOUS; SUBCUTANEOUS at 19:00

## 2023-10-01 RX ADMIN — CIPROFLOXACIN 400 MG: 2 INJECTION, SOLUTION INTRAVENOUS at 17:32

## 2023-10-01 RX ADMIN — POTASSIUM & SODIUM PHOSPHATES POWDER PACK 280-160-250 MG 2 PACKET: 280-160-250 PACK at 21:30

## 2023-10-01 RX ADMIN — BRIVARACETAM 100 MG: 10 SOLUTION ORAL at 08:37

## 2023-10-01 RX ADMIN — BRIVARACETAM 100 MG: 10 SOLUTION ORAL at 21:22

## 2023-10-01 RX ADMIN — CARBAMAZEPINE 150 MG: 100 SUSPENSION ORAL at 00:45

## 2023-10-01 RX ADMIN — CARBAMAZEPINE 150 MG: 100 SUSPENSION ORAL at 06:22

## 2023-10-01 RX ADMIN — CIPROFLOXACIN 2 DROP: 3 SOLUTION OPHTHALMIC at 19:00

## 2023-10-01 RX ADMIN — POTASSIUM & SODIUM PHOSPHATES POWDER PACK 280-160-250 MG 2 PACKET: 280-160-250 PACK at 14:14

## 2023-10-01 ASSESSMENT — ACTIVITIES OF DAILY LIVING (ADL)
ADLS_ACUITY_SCORE: 54
TOILETING: 2-->COMPLETELY DEPENDENT
CONCENTRATING,_REMEMBERING_OR_MAKING_DECISIONS_DIFFICULTY: YES
DOING_ERRANDS_INDEPENDENTLY_DIFFICULTY: YES
DRESSING/BATHING_DIFFICULTY: YES
ADLS_ACUITY_SCORE: 73
DRESS: 2-->COMPLETELY DEPENDENT
SWALLOWING: 2-->DIFFICULTY SWALLOWING LIQUIDS/FOODS
EATING/SWALLOWING: OTHER (SEE COMMENTS)
ADLS_ACUITY_SCORE: 54
BATHING: 2-->COMPLETELY DEPENDENT (NOT DEVELOPMENTALLY APPROPRIATE)
ADLS_ACUITY_SCORE: 54
EATING: 2-->COMPLETELY DEPENDENT (NOT DEVELOPMENTALLY APPROPRIATE)
EATING: 2-->COMPLETELY DEPENDENT
DIFFICULTY_EATING/SWALLOWING: YES
DRESSING/BATHING: BATHING DIFFICULTY, DEPENDENT
WALKING_OR_CLIMBING_STAIRS_DIFFICULTY: YES
ADLS_ACUITY_SCORE: 73
CHANGE_IN_FUNCTIONAL_STATUS_SINCE_ONSET_OF_CURRENT_ILLNESS/INJURY: NO
TRANSFERRING: 2-->COMPLETELY DEPENDENT
ADLS_ACUITY_SCORE: 53
ADLS_ACUITY_SCORE: 54
TOILETING_ASSISTANCE: TOILETING DIFFICULTY, DEPENDENT
ADLS_ACUITY_SCORE: 51
ADLS_ACUITY_SCORE: 73
ADLS_ACUITY_SCORE: 66
ADLS_ACUITY_SCORE: 51
ADLS_ACUITY_SCORE: 66
FALL_HISTORY_WITHIN_LAST_SIX_MONTHS: NO
WEAR_GLASSES_OR_BLIND: NO
TRANSFERRING: 2-->COMPLETELY DEPENDENT (NOT DEVELOPMENTALLY APPROPRIATE)
TOILETING_ISSUES: YES
TOILETING: 2-->COMPLETELY DEPENDENT (NOT DEVELOPMENTALLY APPROPRIATE)
DRESS: 2-->COMPLETELY DEPENDENT (NOT DEVELOPMENTALLY APPROPRIATE)
SWALLOWING: 2-->DIFFICULTY SWALLOWING LIQUIDS/FOODS
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
## 2023-10-01 NOTE — PLAN OF CARE
Vitals stable. Nonverbal, HECTOR orientation but pt alert and able to answer yes/no questions. Up w/ lift, repos in bed- rooke boots and low air loss bed placed. No s/s of pain other than memo cares d/t memo rash. TF running at goal rate 55cc/hr. Lerma removed and external catheter placed, voiding well. Incontinent of bowel. Sacral wound, mepi CDI. D5 infusing, monitor NA. Phos 1.9- replacement started, re-check 0400 tomorrow. Continuing IV abx, plan for discharge back to home with Mother when ready.

## 2023-10-01 NOTE — PROVIDER NOTIFICATION
Notified provider about indwelling aaron catheter discussed removal or continued need.    Did provider choose to remove indwelling aaron catheter? Yes    Provider's aaron indication for keeping indwelling aaron catheter: Other - discontinued .    Is there an order for indwelling aaron catheter? No
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
## 2023-10-01 NOTE — PLAN OF CARE
Orientation: HECTOR. Nonverbal at baseline.   Activity: Bedrest. A2 with lift. T/R q2hr.   Diet/BS Checks: Strict NPO. TF @ 40 ml/hr. Q4hr BS checks.   Tele: N/a  IV Access/Drains: PIV infusing D5 @ 100 ml/hr. GJ tube in place.   Pain Management: HECTOR. T/R agitates pt.   Abnormal VS/Results: On K, Mag, and Phos protocol.  Bowel/Bladder:Incontinent of B/B. External cath in place.   Skin/Wounds: Sacral mepi CDI. Rash around perineum. Scattered scabs.   Consults: Nutrition, SW, WOC  D/C Disposition: Pending  Other Info:

## 2023-10-02 LAB
ALBUMIN SERPL BCG-MCNC: 2.6 G/DL (ref 3.5–5.2)
ALP SERPL-CCNC: 64 U/L (ref 40–129)
ALT SERPL W P-5'-P-CCNC: 23 U/L (ref 0–70)
ANION GAP SERPL CALCULATED.3IONS-SCNC: 9 MMOL/L (ref 7–15)
AST SERPL W P-5'-P-CCNC: 30 U/L (ref 0–45)
BASOPHILS # BLD AUTO: 0 10E3/UL (ref 0–0.2)
BASOPHILS NFR BLD AUTO: 1 %
BILIRUB SERPL-MCNC: 0.2 MG/DL
BUN SERPL-MCNC: 16.7 MG/DL (ref 8–23)
CALCIUM SERPL-MCNC: 7.6 MG/DL (ref 8.8–10.2)
CHLORIDE SERPL-SCNC: 110 MMOL/L (ref 98–107)
CREAT SERPL-MCNC: 0.75 MG/DL (ref 0.67–1.17)
DEPRECATED HCO3 PLAS-SCNC: 24 MMOL/L (ref 22–29)
EGFRCR SERPLBLD CKD-EPI 2021: >90 ML/MIN/1.73M2
EOSINOPHIL # BLD AUTO: 0.1 10E3/UL (ref 0–0.7)
EOSINOPHIL NFR BLD AUTO: 1 %
ERYTHROCYTE [DISTWIDTH] IN BLOOD BY AUTOMATED COUNT: 14.6 % (ref 10–15)
GLUCOSE BLDC GLUCOMTR-MCNC: 122 MG/DL (ref 70–99)
GLUCOSE BLDC GLUCOMTR-MCNC: 142 MG/DL (ref 70–99)
GLUCOSE BLDC GLUCOMTR-MCNC: 144 MG/DL (ref 70–99)
GLUCOSE BLDC GLUCOMTR-MCNC: 164 MG/DL (ref 70–99)
GLUCOSE BLDC GLUCOMTR-MCNC: 185 MG/DL (ref 70–99)
GLUCOSE SERPL-MCNC: 155 MG/DL (ref 70–99)
HCT VFR BLD AUTO: 38.2 % (ref 40–53)
HGB BLD-MCNC: 12 G/DL (ref 13.3–17.7)
IMM GRANULOCYTES # BLD: 0.1 10E3/UL
IMM GRANULOCYTES NFR BLD: 2 %
LYMPHOCYTES # BLD AUTO: 1.8 10E3/UL (ref 0.8–5.3)
LYMPHOCYTES NFR BLD AUTO: 28 %
MAGNESIUM SERPL-MCNC: 1.7 MG/DL (ref 1.7–2.3)
MCH RBC QN AUTO: 28.6 PG (ref 26.5–33)
MCHC RBC AUTO-ENTMCNC: 31.4 G/DL (ref 31.5–36.5)
MCV RBC AUTO: 91 FL (ref 78–100)
MONOCYTES # BLD AUTO: 0.5 10E3/UL (ref 0–1.3)
MONOCYTES NFR BLD AUTO: 8 %
NEUTROPHILS # BLD AUTO: 3.8 10E3/UL (ref 1.6–8.3)
NEUTROPHILS NFR BLD AUTO: 60 %
NRBC # BLD AUTO: 0 10E3/UL
NRBC BLD AUTO-RTO: 0 /100
PHOSPHATE SERPL-MCNC: 2.8 MG/DL (ref 2.5–4.5)
PLATELET # BLD AUTO: 127 10E3/UL (ref 150–450)
POTASSIUM SERPL-SCNC: 3.8 MMOL/L (ref 3.4–5.3)
PROT SERPL-MCNC: 5.9 G/DL (ref 6.4–8.3)
RBC # BLD AUTO: 4.2 10E6/UL (ref 4.4–5.9)
SODIUM SERPL-SCNC: 143 MMOL/L (ref 135–145)
WBC # BLD AUTO: 6.4 10E3/UL (ref 4–11)

## 2023-10-02 PROCEDURE — 250N000013 HC RX MED GY IP 250 OP 250 PS 637: Performed by: STUDENT IN AN ORGANIZED HEALTH CARE EDUCATION/TRAINING PROGRAM

## 2023-10-02 PROCEDURE — 250N000013 HC RX MED GY IP 250 OP 250 PS 637: Performed by: INTERNAL MEDICINE

## 2023-10-02 PROCEDURE — 250N000011 HC RX IP 250 OP 636: Mod: JZ | Performed by: STUDENT IN AN ORGANIZED HEALTH CARE EDUCATION/TRAINING PROGRAM

## 2023-10-02 PROCEDURE — 250N000011 HC RX IP 250 OP 636: Mod: JZ | Performed by: HOSPITALIST

## 2023-10-02 PROCEDURE — 85025 COMPLETE CBC W/AUTO DIFF WBC: CPT | Performed by: STUDENT IN AN ORGANIZED HEALTH CARE EDUCATION/TRAINING PROGRAM

## 2023-10-02 PROCEDURE — 120N000001 HC R&B MED SURG/OB

## 2023-10-02 PROCEDURE — 80053 COMPREHEN METABOLIC PANEL: CPT | Performed by: STUDENT IN AN ORGANIZED HEALTH CARE EDUCATION/TRAINING PROGRAM

## 2023-10-02 PROCEDURE — 36415 COLL VENOUS BLD VENIPUNCTURE: CPT | Performed by: STUDENT IN AN ORGANIZED HEALTH CARE EDUCATION/TRAINING PROGRAM

## 2023-10-02 PROCEDURE — 99233 SBSQ HOSP IP/OBS HIGH 50: CPT | Performed by: INTERNAL MEDICINE

## 2023-10-02 PROCEDURE — 83735 ASSAY OF MAGNESIUM: CPT | Performed by: INTERNAL MEDICINE

## 2023-10-02 PROCEDURE — 258N000003 HC RX IP 258 OP 636: Performed by: HOSPITALIST

## 2023-10-02 PROCEDURE — 84100 ASSAY OF PHOSPHORUS: CPT | Performed by: HOSPITALIST

## 2023-10-02 RX ORDER — HYDROCORTISONE 5 MG/1
15 TABLET ORAL
Status: DISCONTINUED | OUTPATIENT
Start: 2023-10-03 | End: 2023-10-03 | Stop reason: HOSPADM

## 2023-10-02 RX ADMIN — HEPARIN SODIUM 5000 UNITS: 5000 INJECTION, SOLUTION INTRAVENOUS; SUBCUTANEOUS at 20:02

## 2023-10-02 RX ADMIN — CARBAMAZEPINE 150 MG: 100 SUSPENSION ORAL at 06:17

## 2023-10-02 RX ADMIN — MICONAZOLE NITRATE: 2 POWDER TOPICAL at 10:14

## 2023-10-02 RX ADMIN — DEXTROSE MONOHYDRATE: 50 INJECTION, SOLUTION INTRAVENOUS at 04:17

## 2023-10-02 RX ADMIN — CIPROFLOXACIN 2 DROP: 3 SOLUTION OPHTHALMIC at 10:14

## 2023-10-02 RX ADMIN — HEPARIN SODIUM 5000 UNITS: 5000 INJECTION, SOLUTION INTRAVENOUS; SUBCUTANEOUS at 04:03

## 2023-10-02 RX ADMIN — CIPROFLOXACIN 2 DROP: 3 SOLUTION OPHTHALMIC at 13:40

## 2023-10-02 RX ADMIN — BRIVARACETAM 100 MG: 10 SOLUTION ORAL at 10:11

## 2023-10-02 RX ADMIN — Medication 20 MG: at 06:17

## 2023-10-02 RX ADMIN — CIPROFLOXACIN 2 DROP: 3 SOLUTION OPHTHALMIC at 19:54

## 2023-10-02 RX ADMIN — MULTIVITAMIN TABLET 1 TABLET: TABLET at 10:11

## 2023-10-02 RX ADMIN — MICONAZOLE NITRATE: 2 POWDER TOPICAL at 20:03

## 2023-10-02 RX ADMIN — CARBAMAZEPINE 150 MG: 100 SUSPENSION ORAL at 13:44

## 2023-10-02 RX ADMIN — HEPARIN SODIUM 5000 UNITS: 5000 INJECTION, SOLUTION INTRAVENOUS; SUBCUTANEOUS at 13:44

## 2023-10-02 RX ADMIN — LEVOTHYROXINE SODIUM 150 MCG: 75 TABLET ORAL at 10:10

## 2023-10-02 RX ADMIN — BRIVARACETAM 100 MG: 10 SOLUTION ORAL at 21:32

## 2023-10-02 RX ADMIN — CIPROFLOXACIN 400 MG: 2 INJECTION, SOLUTION INTRAVENOUS at 04:04

## 2023-10-02 RX ADMIN — CIPROFLOXACIN 2 DROP: 3 SOLUTION OPHTHALMIC at 16:34

## 2023-10-02 RX ADMIN — HYDROCORTISONE SODIUM SUCCINATE 25 MG: 100 INJECTION, POWDER, FOR SOLUTION INTRAMUSCULAR; INTRAVENOUS at 10:07

## 2023-10-02 RX ADMIN — HYDROCORTISONE 7.5 MG: 5 TABLET ORAL at 19:55

## 2023-10-02 RX ADMIN — CARBAMAZEPINE 100 MG: 100 SUSPENSION ORAL at 18:32

## 2023-10-02 RX ADMIN — CIPROFLOXACIN 400 MG: 2 INJECTION, SOLUTION INTRAVENOUS at 17:15

## 2023-10-02 ASSESSMENT — ACTIVITIES OF DAILY LIVING (ADL)
ADLS_ACUITY_SCORE: 73
ADLS_ACUITY_SCORE: 77
ADLS_ACUITY_SCORE: 73
ADLS_ACUITY_SCORE: 77
ADLS_ACUITY_SCORE: 73
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
## 2023-10-02 NOTE — PLAN OF CARE
Shift:[id number]    Summary: admitted on 09/25 with septic shock due to aspiration pneumonia    Primary Diagnosis: Septic shock secondary to aspiration pneumonia   Acute hypoxic respiratory failure secondary to pneumonia requiring intubation, s/p extubation (9/27/23), resoled    history of TBI with aphasia, hyperparathyroidism on chronic steroids, hypothyroidism, malnutrition status post PEG placement on tube feeds, GERD, DM2, thrombocytopenia, panhypopituitarism    Orientation: HECTOR. Pt is nonverbal baseline    Activity: Ax2/ Lift/ T/R Q2H    Diet/BS Checks: NPO. On TF Jevity 55 ml/hr, water flushes 100 ml q4h    Tele: N/A    IV Access/Drains: L PIV Infusing D5 100 ml/hr. G-J tube    Pain Management:     Abnormal VS/Results: On K, Mg, and P protocol. BG q4h  Albumin-2.8, protein-T-6.1, Hgb-11.8    Bowel/Bladder:  Incontinent, ext cath in place     Skin/Wounds: Mepilex to buttocks/coccyx. Blanchable redness to heels. Rash to perineum-miconazole  powder applied    Consults: Nutrition, SW, WOC    D/C Disposition: Plan pending-to home w/ mother when stable    Other Info: Contact precautions for MRSA and VRE. Sitter at bedside. No s/s of pain other than memo cares d/t memo rash.Vitals stable. Nonverbal, HECTOR orientation. On K, Mag and Phos protocol. Phos to recheck at 0400. On ;low flow mattress. Soft mittens on L hand, pt is very resistive while doing memo cares and oral cares. Pt has hiccups and nausea, managed with PRN meds.

## 2023-10-02 NOTE — PROGRESS NOTES
.Care Management Follow Up    Length of Stay (days): 7    Expected Discharge Date: 10/03/2023     Concerns to be Addressed:       Patient plan of care discussed at interdisciplinary rounds: Yes    Anticipated Discharge Disposition: Home, Home Care     Anticipated Discharge Services: None  Anticipated Discharge DME: None    Patient/family educated on Medicare website which has current facility and service quality ratings: no  Education Provided on the Discharge Plan:    Patient/Family in Agreement with the Plan: yes    Referrals Placed by CM/SW: Internal Clinic Care Coordination, Homecare, Communication hand-offs to next level of Care Providers, Scheduled Follow-up appointments  Private pay costs discussed: Not applicable    Additional Information:  Per Dr. Ash, patient will discharge home tomorrow.  Scheduled [facility] Stretcher for tomorrow with a  time of [id number].  Called pt's mother Savannah(Guardian) and informed her of discharge tomorrow and scheduled stretcher ride.  PCS form completed and will fax on discharge.  Inpatient Care Coordinator will continue to follow for discharge planning.  NANCI Flynn RN, BSN, OCN   Inpatient Care Coordination [address]  Office: [phone]

## 2023-10-03 VITALS
DIASTOLIC BLOOD PRESSURE: 71 MMHG | TEMPERATURE: 98 F | BODY MASS INDEX: 29.83 KG/M2 | HEART RATE: 63 BPM | HEIGHT: 66 IN | SYSTOLIC BLOOD PRESSURE: 143 MMHG | OXYGEN SATURATION: 96 % | RESPIRATION RATE: 18 BRPM | WEIGHT: 185.63 LBS

## 2023-10-03 LAB
ALBUMIN SERPL BCG-MCNC: 2.6 G/DL (ref 3.5–5.2)
ALP SERPL-CCNC: 73 U/L (ref 40–129)
ALT SERPL W P-5'-P-CCNC: 34 U/L (ref 0–70)
ANION GAP SERPL CALCULATED.3IONS-SCNC: 7 MMOL/L (ref 7–15)
AST SERPL W P-5'-P-CCNC: 42 U/L (ref 0–45)
BASO+EOS+MONOS # BLD AUTO: ABNORMAL 10*3/UL
BASO+EOS+MONOS NFR BLD AUTO: ABNORMAL %
BASOPHILS # BLD AUTO: 0.1 10E3/UL (ref 0–0.2)
BASOPHILS NFR BLD AUTO: 1 %
BILIRUB SERPL-MCNC: 0.2 MG/DL
BUN SERPL-MCNC: 18.7 MG/DL (ref 8–23)
CALCIUM SERPL-MCNC: 7.9 MG/DL (ref 8.8–10.2)
CHLORIDE SERPL-SCNC: 110 MMOL/L (ref 98–107)
CREAT SERPL-MCNC: 0.81 MG/DL (ref 0.67–1.17)
DEPRECATED HCO3 PLAS-SCNC: 24 MMOL/L (ref 22–29)
EGFRCR SERPLBLD CKD-EPI 2021: >90 ML/MIN/1.73M2
EOSINOPHIL # BLD AUTO: 0.3 10E3/UL (ref 0–0.7)
EOSINOPHIL NFR BLD AUTO: 4 %
ERYTHROCYTE [DISTWIDTH] IN BLOOD BY AUTOMATED COUNT: 14.7 % (ref 10–15)
GLUCOSE BLDC GLUCOMTR-MCNC: 118 MG/DL (ref 70–99)
GLUCOSE BLDC GLUCOMTR-MCNC: 126 MG/DL (ref 70–99)
GLUCOSE BLDC GLUCOMTR-MCNC: 130 MG/DL (ref 70–99)
GLUCOSE BLDC GLUCOMTR-MCNC: 88 MG/DL (ref 70–99)
GLUCOSE SERPL-MCNC: 122 MG/DL (ref 70–99)
HCT VFR BLD AUTO: 38.1 % (ref 40–53)
HGB BLD-MCNC: 12.3 G/DL (ref 13.3–17.7)
IMM GRANULOCYTES # BLD: 0.3 10E3/UL
IMM GRANULOCYTES NFR BLD: 4 %
LYMPHOCYTES # BLD AUTO: 1.9 10E3/UL (ref 0.8–5.3)
LYMPHOCYTES NFR BLD AUTO: 25 %
MAGNESIUM SERPL-MCNC: 1.7 MG/DL (ref 1.7–2.3)
MCH RBC QN AUTO: 28.9 PG (ref 26.5–33)
MCHC RBC AUTO-ENTMCNC: 32.3 G/DL (ref 31.5–36.5)
MCV RBC AUTO: 89 FL (ref 78–100)
MONOCYTES # BLD AUTO: 0.8 10E3/UL (ref 0–1.3)
MONOCYTES NFR BLD AUTO: 10 %
NEUTROPHILS # BLD AUTO: 4.3 10E3/UL (ref 1.6–8.3)
NEUTROPHILS NFR BLD AUTO: 56 %
NRBC # BLD AUTO: 0 10E3/UL
NRBC BLD AUTO-RTO: 0 /100
PHOSPHATE SERPL-MCNC: 1.8 MG/DL (ref 2.5–4.5)
PLATELET # BLD AUTO: 138 10E3/UL (ref 150–450)
POTASSIUM SERPL-SCNC: 3.8 MMOL/L (ref 3.4–5.3)
PROT SERPL-MCNC: 6.1 G/DL (ref 6.4–8.3)
RBC # BLD AUTO: 4.26 10E6/UL (ref 4.4–5.9)
SODIUM SERPL-SCNC: 141 MMOL/L (ref 135–145)
WBC # BLD AUTO: 7.6 10E3/UL (ref 4–11)

## 2023-10-03 PROCEDURE — 99239 HOSP IP/OBS DSCHRG MGMT >30: CPT | Performed by: INTERNAL MEDICINE

## 2023-10-03 PROCEDURE — 85014 HEMATOCRIT: CPT | Performed by: STUDENT IN AN ORGANIZED HEALTH CARE EDUCATION/TRAINING PROGRAM

## 2023-10-03 PROCEDURE — 250N000013 HC RX MED GY IP 250 OP 250 PS 637: Performed by: STUDENT IN AN ORGANIZED HEALTH CARE EDUCATION/TRAINING PROGRAM

## 2023-10-03 PROCEDURE — 80053 COMPREHEN METABOLIC PANEL: CPT | Performed by: STUDENT IN AN ORGANIZED HEALTH CARE EDUCATION/TRAINING PROGRAM

## 2023-10-03 PROCEDURE — 250N000013 HC RX MED GY IP 250 OP 250 PS 637: Performed by: INTERNAL MEDICINE

## 2023-10-03 PROCEDURE — G0463 HOSPITAL OUTPT CLINIC VISIT: HCPCS

## 2023-10-03 PROCEDURE — 83735 ASSAY OF MAGNESIUM: CPT | Performed by: INTERNAL MEDICINE

## 2023-10-03 PROCEDURE — 250N000011 HC RX IP 250 OP 636: Performed by: STUDENT IN AN ORGANIZED HEALTH CARE EDUCATION/TRAINING PROGRAM

## 2023-10-03 PROCEDURE — 36415 COLL VENOUS BLD VENIPUNCTURE: CPT | Performed by: STUDENT IN AN ORGANIZED HEALTH CARE EDUCATION/TRAINING PROGRAM

## 2023-10-03 PROCEDURE — 84100 ASSAY OF PHOSPHORUS: CPT | Performed by: INTERNAL MEDICINE

## 2023-10-03 RX ORDER — CIPROFLOXACIN 750 MG/1
750 TABLET, FILM COATED ORAL EVERY 12 HOURS
Qty: 12 TABLET | Refills: 0 | Status: SHIPPED | OUTPATIENT
Start: 2023-10-03 | End: 2023-10-09

## 2023-10-03 RX ORDER — CIPROFLOXACIN HYDROCHLORIDE 3.5 MG/ML
2 SOLUTION/ DROPS TOPICAL EVERY 4 HOURS
Qty: 3 ML | Refills: 0 | Status: SHIPPED | OUTPATIENT
Start: 2023-10-03 | End: 2023-10-08

## 2023-10-03 RX ADMIN — MICONAZOLE NITRATE: 2 POWDER TOPICAL at 08:33

## 2023-10-03 RX ADMIN — HEPARIN SODIUM 5000 UNITS: 5000 INJECTION, SOLUTION INTRAVENOUS; SUBCUTANEOUS at 04:12

## 2023-10-03 RX ADMIN — LEVOTHYROXINE SODIUM 150 MCG: 75 TABLET ORAL at 08:32

## 2023-10-03 RX ADMIN — MULTIVITAMIN TABLET 1 TABLET: TABLET at 08:34

## 2023-10-03 RX ADMIN — BRIVARACETAM 100 MG: 10 SOLUTION ORAL at 08:32

## 2023-10-03 RX ADMIN — Medication 20 MG: at 06:39

## 2023-10-03 RX ADMIN — CIPROFLOXACIN HYDROCHLORIDE 750 MG: 250 TABLET, FILM COATED ORAL at 08:32

## 2023-10-03 RX ADMIN — HYDROCORTISONE 15 MG: 5 TABLET ORAL at 06:39

## 2023-10-03 RX ADMIN — CARBAMAZEPINE 150 MG: 100 SUSPENSION ORAL at 06:13

## 2023-10-03 RX ADMIN — CARBAMAZEPINE 150 MG: 100 SUSPENSION ORAL at 00:53

## 2023-10-03 RX ADMIN — CIPROFLOXACIN 2 DROP: 3 SOLUTION OPHTHALMIC at 08:32

## 2023-10-03 ASSESSMENT — ACTIVITIES OF DAILY LIVING (ADL)
ADLS_ACUITY_SCORE: 77
ADLS_ACUITY_SCORE: 75
ADLS_ACUITY_SCORE: 79
ADLS_ACUITY_SCORE: 79
ADLS_ACUITY_SCORE: 77
ADLS_ACUITY_SCORE: 77
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
## 2023-10-03 NOTE — PLAN OF CARE
Shift:07:00-19:30    Summary: admitted on 09/25 with septic shock due to aspiration pneumonia    Primary Diagnosis: Septic shock secondary to aspiration pneumonia   Acute hypoxic respiratory failure secondary to pneumonia requiring intubation, s/p extubation (9/27/23), resolved    history of TBI with aphasia, hyperparathyroidism on chronic steroids, hypothyroidism, malnutrition status post PEG placement on tube feeds, GERD, DM2, thrombocytopenia, panhypopituitarism    Orientation: HECTOR. Pt is nonverbal baseline    Activity: Ax2/ Lift/ T/R Q2H    Diet/BS Checks: NPO. On TF Jevity 55 ml/hr, water flushes 150 ml q4h. BS check q4h    Tele: N/A    IV Access/Drains: G-J tube. SL    Pain Management: Appears comfortable except itchy/red rash on perineum    Abnormal VS/Results: On K, Mg, and P protocol. BG q4h  Albumin-2.6, protein-T-5.9, Hgb-12    Bowel/Bladder:  Incontinent, Skin/Wounds: Mepilex to buttocks/coccyx. Blanchable redness to heels. Rash to perineum-miconazole powder applied.    Consults: Nutrition, SW, WOC    D/C Disposition: Discharge tomorrow with mom    Other Info: Contact precautions for MRSA and VRE. Soft mittens on L hand, pt is very resistive while doing memo cares and oral cares. Pt has hiccups often.

## 2023-10-03 NOTE — PROGRESS NOTES
"[facility] Nurse Inpatient Assessment     Consulted for:  Coccyx/ buttocks    Summary:    Linear DTPI (deep tissue pressure injury) noted across buttocks/coccyx area, unclear history/timeline.   IAD (incontinence-associated dermatitis) to perineal area, possible fungal component.     Patient History (according to provider note(s):      \"63 year old year old male with PMH of TBI with aphasia, aspiration history, recent admission for pneumonia with sepsis treated with IV abx (9/9) now admitted on 9/25/2023 with fever, low blood pressure, with O2 sats in the mid 80s with concern for sepsis 2/2 suspected aspiration pneumonia. \"     Areas Assessed:      Areas visualized during today's visit: Sacrum/coccyx    Wound location: Buttocks, coccyx  Last photo: 10/3    Wound due to: Pressure Injury  Wound history/plan of care: found with criticaid in use   Wound base: linear arc from right outer buttock across coccyx to inner left buttock, outer aspect red non-blanching intact and inner aspects purple boggy tissue that is slightly open in areas     Palpation of the wound bed: normal and boggy      Drainage: scant     Description of drainage: serosanguinous     Measurements (length x width x depth, in cm): approx 14cm long total arc with inner purple aspects that are approx 1 x 3 x 0+cm right and 0.5 x 1.5 x 0.1cm left superior and 0.8 x 2 x 0+cm left inferior      Tunneling: N/A     Undermining: N/A  Periwound skin: Denuded and Erythema- blanchable, improving       Color: pink      Temperature: normal   Odor: none  Pain: no grimacing or signs of discomfort   Pain interventions prior to dressing change: slow and gentle cares   Treatment goal: Heal  and Protection  STATUS: improving  Supplies ordered: supplies stored on unit      Treatment Plan:     Coccyx wound(s): Every other day and prn:  Cleanse with wound cleanser.  If area appears rashy, apply light dusting of antifungal powder.  Appy skin " barrier film to periwound (over any powder), let dry.  Apply Mepilex Sacral to upper coccyx/sacrum.  Do not try to cover lower aspects of wounds near perianal, since if cannot keep a good seal will cause further moisture issues.    If wounds open up near perianal area, apply barrier paste or Triad paste (# [id number]) to wounds BID and prn.  PIP measures.     Perineal cares: BID and prn:  Cleanse groin folds and memo area with Rita perineal lotion and soft dry wipes.  Spray the Rita directly on the skin.  Apply antifungal powder to skin folds and all rashy areas.  Apply thin layer of barrier paste to perianal area prn if loose stools, and to any superficial wounds prn.     Pressure Injury Prevention (PIP) Plan:  -Mattress: low air loss  -HOB: Maintain at or below 30 degrees, unless contraindicated  -Repositioning in bed: Every 1-2 hours , Left/right positioning; avoid supine, and Raise foot of bed prior to raising head of bed, to reduce patient sliding down (shear)  -Heels: Keep elevated off mattress, Pillows under calves, and Heel lift boots prn (# [id number])  -Protective Dressing: Sacral Mepilex for prevention (#[id number]),  especially for the agitated patient   -Positioning Equipment: TAPS wedges (#[id number]) to help maintain 30 degree side lying position   -Moisture Management: Perineal cleansing /protection: Follow Incontinence Protocol, Avoid brief in bed, and Clean and dry skin folds with bathing   -Under Devices: Inspect skin under all medical devices during skin inspection , Ensure tubes are stabilized without tension, and Ensure patient is not lying on medical devices or equipment when repositioned        Orders: Reviewed    RECOMMEND PRIMARY TEAM ORDER: None, at this time  Education provided: plan of care  Discussed plan of care with: Nurse  WO nurse follow-up plan: weekly  Notify WOC if wound(s) deteriorate.  Nursing to notify the Provider(s) and re-consult the WOC Nurse if new skin concern.    DATA:      Current support surface: Standard  Low air loss (ZULY pump, Isolibrium, Pulsate, skin guard, etc) (Isolibrium in ICU)  Containment of urine/stool: Incontinence Protocol and Indwelling catheter  BMI: Body mass index is 29.96 kg/m .   Active diet order: Orders Placed This Encounter      NPO for Medical/Clinical Reasons Except for: Meds      Diet     Output: I/O last 3 completed shifts:  In: 2491 [I.V.:800; NG/GT:1691]  Out: 2500 [Urine:2500]     Labs:   Recent Labs   Lab 10/03/23  1045   ALBUMIN 2.6*   HGB 12.3*   WBC 7.6       Pressure injury risk assessment:   Sensory Perception: 2-->very limited  Moisture: 3-->occasionally moist  Activity: 1-->bedfast  Mobility: 2-->very limited  Nutrition: 3-->adequate  Friction and Shear: 1-->problem  Ricci Score: 12    Shellie CWOCN   1st choice: Securely message with [employer] [facility] [employer] ([facility])   (2nd option: [facility] Office Phone [phone], messages checked periodically Mon-Fri 8a-4p)

## 2023-10-03 NOTE — PLAN OF CARE
HECTOR orientation. Able to intermittently answer yes/no questions. VSS on RA. Pain to perineum with brief changes. WOC orders followed. A2 lift, T/R. Incont B&B. BM x1 and smear. Mepilex to sacrum changed. TF at goal. Loss of IV access. OK to leave out per MD. Soft fernando to L hand, circulation intact. Plan to discharge home tomorrow with mom.

## 2023-10-03 NOTE — PROVIDER NOTIFICATION
MD Notification    Notified Person: MD    Notified Person Name: Joey    Notification Date/Time: 10/03/23 12:13 AM    Notification Interaction: [employer] Messaging    Purpose of Notification: Pt lost IV access this evening, pt is a hard stick, and has IV cipro due at 0400. Pt discharging today on oral meds. Can the cipro be switched to PO? Thanks.    Orders Received:    Comments:

## 2023-10-03 NOTE — PROGRESS NOTES
Care Management Discharge Note    Discharge Date: 10/03/2023       Discharge Disposition: Home, Home Care    Discharge Services: None    Discharge DME: None    Discharge Transportation: agency    Private pay costs discussed: Not applicable    Does the patient's insurance plan have a 3 day qualifying hospital stay waiver?  No    PAS Confirmation Code:    Patient/family educated on Medicare website which has current facility and service quality ratings: no    Education Provided on the Discharge Plan:    Persons Notified of Discharge Plans: bedside RN  Patient/Family in Agreement with the Plan: yes    Handoff Referral Completed:Yes    Additional Information:  Per Dr. Ash, patient is ready for discharge home today with resumption of HC RN & PT.  Patient has scheduled stretcher ride with [facility] at 11:00-11:45 am  time.  Called pt's Guardian-Mother Savannah and informed her of pt's discharge time and resumption of home care with [facility] RN & PT.  Scheduled Virtual PCP appointment with pt's PCP on 10/and scheduled [facility] to do lab work on 10/9.   Berniedanette was informed of lab and Virtual PCP follow-up.    NANCI Flynn RN, BSN, OCN   Inpatient Care Coordination [address]  Office: [phone]

## 2023-10-03 NOTE — PLAN OF CARE
2300 - 2030    Orientation: HECTOR, able to nod yes or no questions    Activity: A2 w/lift, T/R q 2 hrs    Diet/BS Checks: NPO, TF running @ 55 mL/hr with 150 mL FWF q 4 hrs. BG checks q 4 hrs, no corrections needed overnight.    Tele: N/A    IV Access/Drains: No IV access - MD aware. G-tube in place.    Pain Management: Denies pain    Abnormal VS/Results: VSS on RA ex bradycardic and HTN    Bowel/Bladder: Incontinent, 1 BM overnight.    Skin/Wounds: Scattered bruising, redness to buttocks, coccyx, groin - powder applied.    Consults: SW, Nutrition, WOC    D/C Disposition: Discharge to home today @ 1409-8961 via stretcher ride.    Other Info: Refused oral cares. Contact precautions maintained.

## 2023-10-03 NOTE — PLAN OF CARE
Alert, non-verbal. Gives thumbs up with LUE. VSS on RA. No nonverbal signs of pain. NPO. Tube feeding via PEG tube. Incontinent of urine. Turn and repo done.     Patient discharged to home at 1200. Transported in stretcher by [facility] transport. Discharge instructions reviewed with patient's mother/legal guardian by telephone, questions answered. Sent all personal belongings and medications with patient.

## 2023-10-04 ENCOUNTER — APPOINTMENT (OUTPATIENT)
Dept: GENERAL RADIOLOGY | Facility: CLINIC | Age: 63
End: 2023-10-04
Attending: EMERGENCY MEDICINE
Payer: MEDICARE

## 2023-10-04 ENCOUNTER — HOSPITAL ENCOUNTER (EMERGENCY)
Facility: CLINIC | Age: 63
Discharge: HOME | End: 2023-10-04
Attending: EMERGENCY MEDICINE | Admitting: EMERGENCY MEDICINE
Payer: MEDICARE

## 2023-10-04 VITALS
HEART RATE: 57 BPM | BODY MASS INDEX: 29.86 KG/M2 | SYSTOLIC BLOOD PRESSURE: 113 MMHG | TEMPERATURE: 98.5 F | DIASTOLIC BLOOD PRESSURE: 77 MMHG | OXYGEN SATURATION: 94 % | WEIGHT: 185 LBS | RESPIRATION RATE: 11 BRPM

## 2023-10-04 DIAGNOSIS — R47.01: ICD-10-CM

## 2023-10-04 DIAGNOSIS — Z87.01 HISTORY OF ASPIRATION PNEUMONIA: ICD-10-CM

## 2023-10-04 DIAGNOSIS — Z86.19 HISTORY OF SEPSIS: ICD-10-CM

## 2023-10-04 DIAGNOSIS — S09.8XXA: ICD-10-CM

## 2023-10-04 LAB
ANION GAP SERPL CALCULATED.3IONS-SCNC: 10 MMOL/L (ref 7–15)
BASO+EOS+MONOS # BLD AUTO: ABNORMAL 10*3/UL
BASO+EOS+MONOS NFR BLD AUTO: ABNORMAL %
BASOPHILS # BLD AUTO: 0.1 10E3/UL (ref 0–0.2)
BASOPHILS NFR BLD AUTO: 1 %
BUN SERPL-MCNC: 20.4 MG/DL (ref 8–23)
CALCIUM SERPL-MCNC: 8 MG/DL (ref 8.8–10.2)
CHLORIDE SERPL-SCNC: 104 MMOL/L (ref 98–107)
CREAT SERPL-MCNC: 1.01 MG/DL (ref 0.67–1.17)
DEPRECATED HCO3 PLAS-SCNC: 22 MMOL/L (ref 22–29)
EGFRCR SERPLBLD CKD-EPI 2021: 85 ML/MIN/1.73M2
EOSINOPHIL # BLD AUTO: 0.3 10E3/UL (ref 0–0.7)
EOSINOPHIL NFR BLD AUTO: 2 %
ERYTHROCYTE [DISTWIDTH] IN BLOOD BY AUTOMATED COUNT: 15 % (ref 10–15)
GLUCOSE SERPL-MCNC: 88 MG/DL (ref 70–99)
HCO3 BLDV-SCNC: 26 MMOL/L (ref 21–28)
HCT VFR BLD AUTO: 40.8 % (ref 40–53)
HGB BLD-MCNC: 13.3 G/DL (ref 13.3–17.7)
IMM GRANULOCYTES # BLD: 0.5 10E3/UL
IMM GRANULOCYTES NFR BLD: 4 %
LACTATE BLD-SCNC: 1.2 MMOL/L
LYMPHOCYTES # BLD AUTO: 2.6 10E3/UL (ref 0.8–5.3)
LYMPHOCYTES NFR BLD AUTO: 21 %
MCH RBC QN AUTO: 29.2 PG (ref 26.5–33)
MCHC RBC AUTO-ENTMCNC: 32.6 G/DL (ref 31.5–36.5)
MCV RBC AUTO: 90 FL (ref 78–100)
MONOCYTES # BLD AUTO: 1.1 10E3/UL (ref 0–1.3)
MONOCYTES NFR BLD AUTO: 9 %
NEUTROPHILS # BLD AUTO: 8 10E3/UL (ref 1.6–8.3)
NEUTROPHILS NFR BLD AUTO: 63 %
NRBC # BLD AUTO: 0 10E3/UL
NRBC BLD AUTO-RTO: 0 /100
PCO2 BLDV: 44 MM HG (ref 40–50)
PH BLDV: 7.38 [PH] (ref 7.32–7.43)
PLATELET # BLD AUTO: 179 10E3/UL (ref 150–450)
PO2 BLDV: 34 MM HG (ref 25–47)
POTASSIUM SERPL-SCNC: 4.4 MMOL/L (ref 3.4–5.3)
PROCALCITONIN SERPL IA-MCNC: 0.06 NG/ML
RBC # BLD AUTO: 4.55 10E6/UL (ref 4.4–5.9)
SAO2 % BLDV: 65 % (ref 94–100)
SODIUM SERPL-SCNC: 136 MMOL/L (ref 135–145)
WBC # BLD AUTO: 12.6 10E3/UL (ref 4–11)

## 2023-10-04 PROCEDURE — 99284 EMERGENCY DEPT VISIT MOD MDM: CPT | Mod: 25

## 2023-10-04 PROCEDURE — 80048 BASIC METABOLIC PNL TOTAL CA: CPT | Performed by: EMERGENCY MEDICINE

## 2023-10-04 PROCEDURE — 258N000003 HC RX IP 258 OP 636: Performed by: EMERGENCY MEDICINE

## 2023-10-04 PROCEDURE — 96360 HYDRATION IV INFUSION INIT: CPT

## 2023-10-04 PROCEDURE — 36415 COLL VENOUS BLD VENIPUNCTURE: CPT | Performed by: EMERGENCY MEDICINE

## 2023-10-04 PROCEDURE — 85004 AUTOMATED DIFF WBC COUNT: CPT | Performed by: EMERGENCY MEDICINE

## 2023-10-04 PROCEDURE — 84145 PROCALCITONIN (PCT): CPT | Performed by: EMERGENCY MEDICINE

## 2023-10-04 PROCEDURE — 96361 HYDRATE IV INFUSION ADD-ON: CPT

## 2023-10-04 PROCEDURE — 82803 BLOOD GASES ANY COMBINATION: CPT

## 2023-10-04 PROCEDURE — 71046 X-RAY EXAM CHEST 2 VIEWS: CPT

## 2023-10-04 RX ADMIN — SODIUM CHLORIDE 500 ML: 9 INJECTION, SOLUTION INTRAVENOUS at 12:39

## 2023-10-04 ASSESSMENT — ACTIVITIES OF DAILY LIVING (ADL)
ADLS_ACUITY_SCORE: 37

## 2023-10-04 NOTE — ED TRIAGE NOTES
Pt presents by [facility] from home where he lives with mother but receives nursing cares- pt was admitted for a month with PNA- pt was discharged yesterday and staff concerned that pt had increased resp rate, work of breathing, and possible fever. Pt does have cough in ED which is nonproductive. Pt 95% on RA     Triage Assessment       Row Name 10/04/23 1223       Triage Assessment (Adult)    Airway WDL WDL       Respiratory WDL    Respiratory WDL X  cough, recent PNA       Skin Circulation/Temperature WDL    Skin Circulation/Temperature WDL WDL       Cardiac WDL    Cardiac WDL WDL       Peripheral/Neurovascular WDL    Peripheral Neurovascular WDL WDL       Cognitive/Neuro/Behavioral WDL    Cognitive/Neuro/Behavioral WDL X  baseline cognitive impairment, giving thumbs up to questions

## 2023-10-04 NOTE — DISCHARGE INSTRUCTIONS
You should keep giving him his medications including his antibiotics he was discharged from, continue to monitor his breathing.  If you notice high fevers, difficulty breathing getting, low oxygen levels you should return here to the emergency department.  Otherwise had follow-up with his primary doctor for recheck
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
## 2023-10-06 ENCOUNTER — TELEPHONE (OUTPATIENT)
Dept: FAMILY MEDICINE | Facility: CLINIC | Age: 63
End: 2023-10-06
Payer: MEDICARE

## 2023-10-06 NOTE — LETTER
October 6, 2023      Keyon Farias  [address] [place] [address]        Dear Keyon,     Your hospital team has recommended you schedule a Medication Therapy Management (MTM) appointment. MTM is designed to help you get the most of out of your medicines.     During an MTM appointment a specially trained pharmacist will review all of your medicines, both prescription and over-the-counter. They will make sure your medicines are the best choice for you and are safe and convenient for you.  MTM pharmacists work together with you and your doctor to help you understand your medicines, solve any problems related to your medicines and help you get the best results from taking your medicines.     At [facility], we strongly believe in a team approach to health care. We want to help you understand your medicines and health conditions. To learn more about how you might benefit from MTM services, check out the following website:    [email or web address]    To make an appointment, please call the clinic at [phone] or the MTM scheduling line at [phone] (toll-free at [phone]).    We look forward to hearing from you!        Julia Charles, [place], BCACP  Medication Therapy Management Provider  Pager: [phone]

## 2023-10-06 NOTE — TELEPHONE ENCOUNTER
MTM referral from: Transitions of Care (recent hospital discharge or ED visit)    MTM referral outreach attempt #2 on October 6, 2023 at 11:18 AM      Outcome: Patient not reachable after several attempts, will route to MT Pharmacist/Provider as an FYI.  [facility] scheduling number is [phone].  Thank you for the referral.    Use VBC (symone) for the carrier/Plan on the flowsheet      Patient needs Letter sent    Annie Thomas CPhT  [facility]

## 2023-10-06 NOTE — TELEPHONE ENCOUNTER
Sent referral letter.    Justin HortonD, [facility]  Medication Therapy Management Provider  Pager: [phone]

## 2023-10-06 NOTE — TELEPHONE ENCOUNTER
Home Care is calling regarding an established patient with  [facility].        9/21/2023     8:14 AM   Home Care Information   Date of Home Care episode start 9/15/2023   Current following provider Elsa Queen   Date provider agreed to follow 9/15/2023    Name/Phone Number NANCI Anderson #[phone]   Home Care agency [facility]     Requesting orders from: Elsa Queen  Provider is following patient: Yes  Is this a 60-day recertification request?  No    Orders Requested    Skilled Nursing  Request for resumption in care.     1 time a week for 4 weeks, every 3 weeks for 3 weeks, 3 PRN visits  Reason: Disease Management     Physical Therapy  Request for initial evaluation and treatment (one time)     Wound Care Orders (wound on coccyx):   Cleanse with mild soapy water, pat dry, apply barrier cream  Perform wound cares BID and PRN, caregiver to perform wound care on non skilled RN days, discontinue when wound is healed    Verbal orders given for PT.  Information was gathered for Skilled RN and Wound Care.  Provider review needed.  RN will contact Home Care with information after provider review.  Confirmed ok to leave a detailed message with call back.  Contact information confirmed and updated as needed.    Risa Epstein RN

## 2023-10-09 NOTE — TELEPHONE ENCOUNTER
Called NANCI Anderson regarding PCP message below. Left detailed VM r/t confidential VM. Left clinic number to call back with questions/concerns.     Kamryn Murcia RN on 10/9/2023 at 12:29 PM

## 2023-10-10 ENCOUNTER — VIRTUAL VISIT (OUTPATIENT)
Dept: FAMILY MEDICINE | Facility: CLINIC | Age: 63
End: 2023-10-10
Payer: MEDICARE

## 2023-10-10 DIAGNOSIS — G40.803 INTRACTABLE EPILEPSY DUE TO EXTERNAL CAUSES WITH STATUS EPILEPTICUS (H): ICD-10-CM

## 2023-10-10 DIAGNOSIS — J18.9 PNEUMONIA OF BOTH LOWER LOBES DUE TO INFECTIOUS ORGANISM: ICD-10-CM

## 2023-10-10 DIAGNOSIS — Z09 HOSPITAL DISCHARGE FOLLOW-UP: Primary | ICD-10-CM

## 2023-10-10 DIAGNOSIS — R68.89 EXCESSIVE ORAL SECRETIONS: ICD-10-CM

## 2023-10-10 PROCEDURE — 99213 OFFICE O/P EST LOW 20 MIN: CPT | Mod: 95 | Performed by: PHYSICIAN ASSISTANT

## 2023-10-10 RX ORDER — CARBAMAZEPINE 100 MG/5ML
150 SUSPENSION ORAL 3 TIMES DAILY
Status: CANCELLED | OUTPATIENT
Start: 2023-10-10

## 2023-10-10 RX ORDER — CARBAMAZEPINE 100 MG/5ML
150 SUSPENSION ORAL 3 TIMES DAILY
Qty: 2700 ML | Refills: 1 | Status: SHIPPED | OUTPATIENT
Start: 2023-10-10 | End: 2024-05-18
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
## 2023-10-12 ENCOUNTER — TELEPHONE (OUTPATIENT)
Dept: FAMILY MEDICINE | Facility: CLINIC | Age: 63
End: 2023-10-12
Payer: MEDICARE

## 2023-10-12 NOTE — TELEPHONE ENCOUNTER
Left message regarding the approval of orders.     NANCI Sexton  [facility]

## 2023-10-12 NOTE — TELEPHONE ENCOUNTER
Home Care is calling regarding an established patient with  [employer].        9/21/2023     8:14 AM   Home Care Information   Date of Home Care episode start 9/15/2023   Current following provider Elsa Queen   Date provider agreed to follow 9/15/2023    Name/Phone Number NANCI Anderson #[phone]   Home Care agency [facility]     Requesting orders from: Elsa Queen  Provider is following patient: Yes  Is this a 60-day recertification request?  No    Orders Requested    Physical Therapy  Request for delay in care, service is not able to be provided within same scheduled day.     Request to move PT eval to 10/18/2023.      Confirmed ok to leave a detailed message with call back.  Contact information confirmed and updated as needed.    Stacey Curry RN

## 2023-10-18 ENCOUNTER — TELEPHONE (OUTPATIENT)
Dept: FAMILY MEDICINE | Facility: CLINIC | Age: 63
End: 2023-10-18
Payer: MEDICARE

## 2023-10-18 NOTE — TELEPHONE ENCOUNTER
Home Care is calling regarding an established patient with [facility].        9/21/2023     8:14 AM   Home Care Information   Date of Home Care episode start 9/15/2023   Current following provider Elsa Queen   Date provider agreed to follow 9/15/2023    Name/Phone Number NANCI Anderson #[phone]   Home Care agency [facility]     Requesting orders from: Elsa Queen  Provider is following patient: Yes  Is this a 60-day recertification request?  No    Orders Requested    Physical Therapy  Request for continuation of care with no increase or decrease in frequency  Frequency:  1x/wk for 3 wks        Occupational Therapy  Request for initial evaluation and treatment (one time)     Verbal orders given.  Home Care will send orders for provider to sign.  Confirmed ok to leave a detailed message with call back.  Contact information confirmed and updated as needed.    Risa Epstein RN

## 2023-10-20 ENCOUNTER — TELEPHONE (OUTPATIENT)
Dept: FAMILY MEDICINE | Facility: CLINIC | Age: 63
End: 2023-10-20
Payer: MEDICARE

## 2023-10-20 DIAGNOSIS — Z13.220 SCREENING FOR HYPERLIPIDEMIA: Primary | ICD-10-CM

## 2023-10-20 NOTE — TELEPHONE ENCOUNTER
"TO PCP    Patients mom calling stating, \"He is having his blood drawn on the 26th. Does he need to fast for that?\"      Currently no future labs pending.     Please advise.     Kamryn Murcia RN on 10/20/2023 at 4:22 PM    "

## 2023-10-21 ENCOUNTER — APPOINTMENT (OUTPATIENT)
Dept: GENERAL RADIOLOGY | Facility: CLINIC | Age: 63
End: 2023-10-21
Attending: EMERGENCY MEDICINE
Payer: MEDICARE

## 2023-10-21 ENCOUNTER — HOSPITAL ENCOUNTER (EMERGENCY)
Facility: CLINIC | Age: 63
Discharge: HOME | End: 2023-10-21
Attending: EMERGENCY MEDICINE | Admitting: EMERGENCY MEDICINE
Payer: MEDICARE

## 2023-10-21 ENCOUNTER — APPOINTMENT (OUTPATIENT)
Dept: CT IMAGING | Facility: CLINIC | Age: 63
End: 2023-10-21
Attending: EMERGENCY MEDICINE
Payer: MEDICARE

## 2023-10-21 VITALS
HEART RATE: 101 BPM | RESPIRATION RATE: 16 BRPM | SYSTOLIC BLOOD PRESSURE: 105 MMHG | DIASTOLIC BLOOD PRESSURE: 63 MMHG | OXYGEN SATURATION: 94 % | TEMPERATURE: 99.1 F

## 2023-10-21 DIAGNOSIS — R50.9 FEVER, UNSPECIFIED FEVER CAUSE: ICD-10-CM

## 2023-10-21 DIAGNOSIS — U07.1 COVID-19: ICD-10-CM

## 2023-10-21 LAB
ALBUMIN SERPL BCG-MCNC: 3.9 G/DL (ref 3.5–5.2)
ALBUMIN UR-MCNC: 300 MG/DL
ALP SERPL-CCNC: 116 U/L (ref 40–129)
ALT SERPL W P-5'-P-CCNC: 21 U/L (ref 0–70)
ANION GAP SERPL CALCULATED.3IONS-SCNC: 10 MMOL/L (ref 7–15)
APPEARANCE UR: CLEAR
AST SERPL W P-5'-P-CCNC: 25 U/L (ref 0–45)
BASO+EOS+MONOS # BLD AUTO: ABNORMAL 10*3/UL
BASO+EOS+MONOS NFR BLD AUTO: ABNORMAL %
BASOPHILS # BLD AUTO: 0.1 10E3/UL (ref 0–0.2)
BASOPHILS NFR BLD AUTO: 1 %
BILIRUB SERPL-MCNC: 0.4 MG/DL
BILIRUB UR QL STRIP: NEGATIVE
BUN SERPL-MCNC: 25.1 MG/DL (ref 8–23)
CALCIUM SERPL-MCNC: 8.6 MG/DL (ref 8.8–10.2)
CHLORIDE SERPL-SCNC: 92 MMOL/L (ref 98–107)
COLOR UR AUTO: YELLOW
CREAT SERPL-MCNC: 1.11 MG/DL (ref 0.67–1.17)
D DIMER PPP FEU-MCNC: 0.59 UG/ML FEU (ref 0–0.5)
DEPRECATED HCO3 PLAS-SCNC: 34 MMOL/L (ref 22–29)
EGFRCR SERPLBLD CKD-EPI 2021: 76 ML/MIN/1.73M2
EOSINOPHIL # BLD AUTO: 0.3 10E3/UL (ref 0–0.7)
EOSINOPHIL NFR BLD AUTO: 4 %
ERYTHROCYTE [DISTWIDTH] IN BLOOD BY AUTOMATED COUNT: 13.9 % (ref 10–15)
FLUAV RNA SPEC QL NAA+PROBE: NEGATIVE
FLUBV RNA RESP QL NAA+PROBE: NEGATIVE
GLUCOSE SERPL-MCNC: 115 MG/DL (ref 70–99)
GLUCOSE UR STRIP-MCNC: NEGATIVE MG/DL
HCT VFR BLD AUTO: 42.8 % (ref 40–53)
HGB BLD-MCNC: 14.1 G/DL (ref 13.3–17.7)
HGB UR QL STRIP: NEGATIVE
IMM GRANULOCYTES # BLD: 0 10E3/UL
IMM GRANULOCYTES NFR BLD: 0 %
KETONES UR STRIP-MCNC: NEGATIVE MG/DL
LACTATE SERPL-SCNC: 1.8 MMOL/L (ref 0.7–2)
LEUKOCYTE ESTERASE UR QL STRIP: NEGATIVE
LYMPHOCYTES # BLD AUTO: 1.6 10E3/UL (ref 0.8–5.3)
LYMPHOCYTES NFR BLD AUTO: 20 %
MCH RBC QN AUTO: 29.1 PG (ref 26.5–33)
MCHC RBC AUTO-ENTMCNC: 32.9 G/DL (ref 31.5–36.5)
MCV RBC AUTO: 88 FL (ref 78–100)
MONOCYTES # BLD AUTO: 1.1 10E3/UL (ref 0–1.3)
MONOCYTES NFR BLD AUTO: 13 %
MUCOUS THREADS #/AREA URNS LPF: PRESENT /LPF
NEUTROPHILS # BLD AUTO: 5.1 10E3/UL (ref 1.6–8.3)
NEUTROPHILS NFR BLD AUTO: 62 %
NITRATE UR QL: NEGATIVE
NRBC # BLD AUTO: 0 10E3/UL
NRBC BLD AUTO-RTO: 0 /100
PH UR STRIP: 8.5 [PH] (ref 5–7)
PLATELET # BLD AUTO: 140 10E3/UL (ref 150–450)
POTASSIUM SERPL-SCNC: 3.5 MMOL/L (ref 3.4–5.3)
PROCALCITONIN SERPL IA-MCNC: 0.11 NG/ML
PROT SERPL-MCNC: 7.4 G/DL (ref 6.4–8.3)
RBC # BLD AUTO: 4.85 10E6/UL (ref 4.4–5.9)
RBC URINE: 0 /HPF
RSV RNA SPEC NAA+PROBE: NEGATIVE
SARS-COV-2 RNA RESP QL NAA+PROBE: POSITIVE
SODIUM SERPL-SCNC: 136 MMOL/L (ref 135–145)
SP GR UR STRIP: 1.03 (ref 1–1.03)
UROBILINOGEN UR STRIP-MCNC: 2 MG/DL
WBC # BLD AUTO: 8.2 10E3/UL (ref 4–11)
WBC URINE: <1 /HPF

## 2023-10-21 PROCEDURE — 84145 PROCALCITONIN (PCT): CPT | Performed by: EMERGENCY MEDICINE

## 2023-10-21 PROCEDURE — 74177 CT ABD & PELVIS W/CONTRAST: CPT | Mod: MG

## 2023-10-21 PROCEDURE — 81001 URINALYSIS AUTO W/SCOPE: CPT | Performed by: EMERGENCY MEDICINE

## 2023-10-21 PROCEDURE — 99285 EMERGENCY DEPT VISIT HI MDM: CPT | Mod: 25

## 2023-10-21 PROCEDURE — 87040 BLOOD CULTURE FOR BACTERIA: CPT | Performed by: EMERGENCY MEDICINE

## 2023-10-21 PROCEDURE — 250N000009 HC RX 250: Performed by: EMERGENCY MEDICINE

## 2023-10-21 PROCEDURE — 250N000011 HC RX IP 250 OP 636: Mod: JZ | Performed by: EMERGENCY MEDICINE

## 2023-10-21 PROCEDURE — 80053 COMPREHEN METABOLIC PANEL: CPT | Performed by: EMERGENCY MEDICINE

## 2023-10-21 PROCEDURE — 71046 X-RAY EXAM CHEST 2 VIEWS: CPT

## 2023-10-21 PROCEDURE — 96374 THER/PROPH/DIAG INJ IV PUSH: CPT | Mod: 59

## 2023-10-21 PROCEDURE — 85025 COMPLETE CBC W/AUTO DIFF WBC: CPT | Performed by: EMERGENCY MEDICINE

## 2023-10-21 PROCEDURE — 85379 FIBRIN DEGRADATION QUANT: CPT | Performed by: EMERGENCY MEDICINE

## 2023-10-21 PROCEDURE — 250N000011 HC RX IP 250 OP 636: Performed by: EMERGENCY MEDICINE

## 2023-10-21 PROCEDURE — 258N000003 HC RX IP 258 OP 636: Performed by: EMERGENCY MEDICINE

## 2023-10-21 PROCEDURE — 96361 HYDRATE IV INFUSION ADD-ON: CPT

## 2023-10-21 PROCEDURE — 250N000013 HC RX MED GY IP 250 OP 250 PS 637: Performed by: EMERGENCY MEDICINE

## 2023-10-21 PROCEDURE — 36415 COLL VENOUS BLD VENIPUNCTURE: CPT | Performed by: EMERGENCY MEDICINE

## 2023-10-21 PROCEDURE — G1010 CDSM STANSON: HCPCS

## 2023-10-21 PROCEDURE — 87637 SARSCOV2&INF A&B&RSV AMP PRB: CPT | Performed by: EMERGENCY MEDICINE

## 2023-10-21 PROCEDURE — 83605 ASSAY OF LACTIC ACID: CPT | Performed by: EMERGENCY MEDICINE

## 2023-10-21 RX ORDER — DEXAMETHASONE SODIUM PHOSPHATE 4 MG/ML
6 INJECTION, SOLUTION INTRA-ARTICULAR; INTRALESIONAL; INTRAMUSCULAR; INTRAVENOUS; SOFT TISSUE ONCE
Status: COMPLETED | OUTPATIENT
Start: 2023-10-21 | End: 2023-10-21

## 2023-10-21 RX ORDER — DEXAMETHASONE SODIUM PHOSPHATE 4 MG/ML
10 INJECTION, SOLUTION INTRA-ARTICULAR; INTRALESIONAL; INTRAMUSCULAR; INTRAVENOUS; SOFT TISSUE ONCE
Status: DISCONTINUED | OUTPATIENT
Start: 2023-10-21 | End: 2023-10-21

## 2023-10-21 RX ORDER — CARBAMAZEPINE 100 MG/5ML
150 SUSPENSION ORAL ONCE
Status: COMPLETED | OUTPATIENT
Start: 2023-10-21 | End: 2023-10-21

## 2023-10-21 RX ORDER — IOPAMIDOL 755 MG/ML
91 INJECTION, SOLUTION INTRAVASCULAR ONCE
Status: COMPLETED | OUTPATIENT
Start: 2023-10-21 | End: 2023-10-21

## 2023-10-21 RX ADMIN — CARBAMAZEPINE 150 MG: 100 SUSPENSION ORAL at 13:12

## 2023-10-21 RX ADMIN — SODIUM CHLORIDE 1000 ML: 9 INJECTION, SOLUTION INTRAVENOUS at 12:45

## 2023-10-21 RX ADMIN — DEXAMETHASONE SODIUM PHOSPHATE 6 MG: 4 INJECTION, SOLUTION INTRAMUSCULAR; INTRAVENOUS at 15:01

## 2023-10-21 RX ADMIN — SODIUM CHLORIDE 68 ML: 9 INJECTION, SOLUTION INTRAVENOUS at 14:18

## 2023-10-21 RX ADMIN — IOPAMIDOL 91 ML: 755 INJECTION, SOLUTION INTRAVENOUS at 14:18

## 2023-10-21 ASSESSMENT — ACTIVITIES OF DAILY LIVING (ADL)
ADLS_ACUITY_SCORE: 37
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
## 2023-10-24 LAB — BACTERIA SPEC CULT: ABNORMAL

## 2023-10-24 NOTE — TELEPHONE ENCOUNTER
Received a call back from the patient's Mother, Savannah. Informed lab orders have been placed and patient does need to come to the lab appointment fasting. Savannah states the patient did test positive for COVID on Saturday. Re-scheduled patient's lab appointment to next week so patient can complete his 5 day isolation period.     Appointments in Next Year      Oct 31, 2023  1:30 PM  LAB with CS LAB  [facility] ([facility] ) [phone]     Closing encounter.    Risa Epstein RN

## 2023-10-24 NOTE — TELEPHONE ENCOUNTER
Patient Contact    Attempt # 1    Was call answered? No.    Mailbox is full. Unable to leave a message.     Kamryn Harris RN

## 2023-10-25 ENCOUNTER — NURSE TRIAGE (OUTPATIENT)
Dept: FAMILY MEDICINE | Facility: CLINIC | Age: 63
End: 2023-10-25
Payer: MEDICARE

## 2023-10-25 ENCOUNTER — HOSPITAL ENCOUNTER (INPATIENT)
Facility: CLINIC | Age: 63
LOS: 3 days | Discharge: ANOTHER HOSPITAL | DRG: 177 | End: 2023-10-28
Attending: EMERGENCY MEDICINE | Admitting: INTERNAL MEDICINE
Payer: MEDICARE

## 2023-10-25 ENCOUNTER — APPOINTMENT (OUTPATIENT)
Dept: GENERAL RADIOLOGY | Facility: CLINIC | Age: 63
DRG: 177 | End: 2023-10-25
Attending: EMERGENCY MEDICINE
Payer: MEDICARE

## 2023-10-25 DIAGNOSIS — U07.1 COVID-19 VIRUS INFECTION: ICD-10-CM

## 2023-10-25 DIAGNOSIS — J96.01 ACUTE RESPIRATORY FAILURE WITH HYPOXIA (H): ICD-10-CM

## 2023-10-25 DIAGNOSIS — G81.01 FLACCID HEMIPLEGIA AFFECTING RIGHT DOMINANT SIDE, UNSPECIFIED ETIOLOGY (H): Primary | ICD-10-CM

## 2023-10-25 DIAGNOSIS — J18.9 PNEUMONIA DUE TO INFECTIOUS ORGANISM, UNSPECIFIED LATERALITY, UNSPECIFIED PART OF LUNG: ICD-10-CM

## 2023-10-25 LAB
ALBUMIN SERPL BCG-MCNC: 3.6 G/DL (ref 3.5–5.2)
ALBUMIN UR-MCNC: 50 MG/DL
ALP SERPL-CCNC: 109 U/L (ref 40–129)
ALT SERPL W P-5'-P-CCNC: 19 U/L (ref 0–70)
ANION GAP SERPL CALCULATED.3IONS-SCNC: 12 MMOL/L (ref 7–15)
APPEARANCE UR: CLEAR
AST SERPL W P-5'-P-CCNC: 25 U/L (ref 0–45)
BASOPHILS # BLD AUTO: 0.1 10E3/UL (ref 0–0.2)
BASOPHILS NFR BLD AUTO: 0 %
BILIRUB SERPL-MCNC: 0.6 MG/DL
BILIRUB UR QL STRIP: NEGATIVE
BUN SERPL-MCNC: 19 MG/DL (ref 8–23)
CALCIUM SERPL-MCNC: 8.8 MG/DL (ref 8.8–10.2)
CHLORIDE SERPL-SCNC: 96 MMOL/L (ref 98–107)
COLOR UR AUTO: YELLOW
CREAT SERPL-MCNC: 0.98 MG/DL (ref 0.67–1.17)
DEPRECATED HCO3 PLAS-SCNC: 30 MMOL/L (ref 22–29)
EGFRCR SERPLBLD CKD-EPI 2021: 88 ML/MIN/1.73M2
EOSINOPHIL # BLD AUTO: 0.3 10E3/UL (ref 0–0.7)
EOSINOPHIL NFR BLD AUTO: 2 %
ERYTHROCYTE [DISTWIDTH] IN BLOOD BY AUTOMATED COUNT: 14.6 % (ref 10–15)
GLUCOSE SERPL-MCNC: 112 MG/DL (ref 70–99)
GLUCOSE UR STRIP-MCNC: NEGATIVE MG/DL
HCO3 BLDV-SCNC: 33 MMOL/L (ref 21–28)
HCT VFR BLD AUTO: 43.8 % (ref 40–53)
HGB BLD-MCNC: 14.4 G/DL (ref 13.3–17.7)
HGB UR QL STRIP: NEGATIVE
HOLD SPECIMEN: NORMAL
HOLD SPECIMEN: NORMAL
IMM GRANULOCYTES # BLD: 0 10E3/UL
IMM GRANULOCYTES NFR BLD: 0 %
KETONES UR STRIP-MCNC: NEGATIVE MG/DL
LACTATE BLD-SCNC: 0.8 MMOL/L
LACTATE SERPL-SCNC: 1.8 MMOL/L (ref 0.7–2)
LEUKOCYTE ESTERASE UR QL STRIP: NEGATIVE
LYMPHOCYTES # BLD AUTO: 2.3 10E3/UL (ref 0.8–5.3)
LYMPHOCYTES NFR BLD AUTO: 19 %
MCH RBC QN AUTO: 29.1 PG (ref 26.5–33)
MCHC RBC AUTO-ENTMCNC: 32.9 G/DL (ref 31.5–36.5)
MCV RBC AUTO: 89 FL (ref 78–100)
MONOCYTES # BLD AUTO: 1.4 10E3/UL (ref 0–1.3)
MONOCYTES NFR BLD AUTO: 11 %
MUCOUS THREADS #/AREA URNS LPF: PRESENT /LPF
NEUTROPHILS # BLD AUTO: 8 10E3/UL (ref 1.6–8.3)
NEUTROPHILS NFR BLD AUTO: 68 %
NITRATE UR QL: NEGATIVE
NRBC # BLD AUTO: 0 10E3/UL
NRBC BLD AUTO-RTO: 0 /100
PCO2 BLDV: 45 MM HG (ref 40–50)
PH BLDV: 7.47 [PH] (ref 7.32–7.43)
PH UR STRIP: 8.5 [PH] (ref 5–7)
PLATELET # BLD AUTO: 144 10E3/UL (ref 150–450)
PO2 BLDV: 95 MM HG (ref 25–47)
POTASSIUM SERPL-SCNC: 3.6 MMOL/L (ref 3.4–5.3)
PROCALCITONIN SERPL IA-MCNC: 0.1 NG/ML
PROT SERPL-MCNC: 8 G/DL (ref 6.4–8.3)
RBC # BLD AUTO: 4.95 10E6/UL (ref 4.4–5.9)
RBC URINE: 1 /HPF
SAO2 % BLDV: 98 % (ref 94–100)
SODIUM SERPL-SCNC: 138 MMOL/L (ref 135–145)
SP GR UR STRIP: 1.03 (ref 1–1.03)
UROBILINOGEN UR STRIP-MCNC: 4 MG/DL
WBC # BLD AUTO: 12.1 10E3/UL (ref 4–11)
WBC URINE: 2 /HPF

## 2023-10-25 PROCEDURE — 250N000009 HC RX 250: Performed by: INTERNAL MEDICINE

## 2023-10-25 PROCEDURE — 80053 COMPREHEN METABOLIC PANEL: CPT | Performed by: EMERGENCY MEDICINE

## 2023-10-25 PROCEDURE — 85025 COMPLETE CBC W/AUTO DIFF WBC: CPT | Performed by: EMERGENCY MEDICINE

## 2023-10-25 PROCEDURE — 96365 THER/PROPH/DIAG IV INF INIT: CPT

## 2023-10-25 PROCEDURE — 258N000003 HC RX IP 258 OP 636: Performed by: INTERNAL MEDICINE

## 2023-10-25 PROCEDURE — 99292 CRITICAL CARE ADDL 30 MIN: CPT

## 2023-10-25 PROCEDURE — 84145 PROCALCITONIN (PCT): CPT | Performed by: INTERNAL MEDICINE

## 2023-10-25 PROCEDURE — 96361 HYDRATE IV INFUSION ADD-ON: CPT

## 2023-10-25 PROCEDURE — XW033E5 INTRODUCTION OF REMDESIVIR ANTI-INFECTIVE INTO PERIPHERAL VEIN, PERCUTANEOUS APPROACH, NEW TECHNOLOGY GROUP 5: ICD-10-PCS | Performed by: EMERGENCY MEDICINE

## 2023-10-25 PROCEDURE — 82803 BLOOD GASES ANY COMBINATION: CPT

## 2023-10-25 PROCEDURE — 84100 ASSAY OF PHOSPHORUS: CPT | Performed by: INTERNAL MEDICINE

## 2023-10-25 PROCEDURE — 99291 CRITICAL CARE FIRST HOUR: CPT | Mod: 25

## 2023-10-25 PROCEDURE — 36415 COLL VENOUS BLD VENIPUNCTURE: CPT | Performed by: EMERGENCY MEDICINE

## 2023-10-25 PROCEDURE — 99223 1ST HOSP IP/OBS HIGH 75: CPT | Mod: AI | Performed by: INTERNAL MEDICINE

## 2023-10-25 PROCEDURE — 250N000013 HC RX MED GY IP 250 OP 250 PS 637: Performed by: INTERNAL MEDICINE

## 2023-10-25 PROCEDURE — 96375 TX/PRO/DX INJ NEW DRUG ADDON: CPT

## 2023-10-25 PROCEDURE — 93005 ELECTROCARDIOGRAM TRACING: CPT

## 2023-10-25 PROCEDURE — 81001 URINALYSIS AUTO W/SCOPE: CPT | Performed by: EMERGENCY MEDICINE

## 2023-10-25 PROCEDURE — 120N000001 HC R&B MED SURG/OB

## 2023-10-25 PROCEDURE — 258N000003 HC RX IP 258 OP 636: Performed by: EMERGENCY MEDICINE

## 2023-10-25 PROCEDURE — 250N000011 HC RX IP 250 OP 636: Mod: JZ | Performed by: EMERGENCY MEDICINE

## 2023-10-25 PROCEDURE — 71045 X-RAY EXAM CHEST 1 VIEW: CPT

## 2023-10-25 PROCEDURE — 250N000011 HC RX IP 250 OP 636: Performed by: INTERNAL MEDICINE

## 2023-10-25 PROCEDURE — 83605 ASSAY OF LACTIC ACID: CPT | Performed by: EMERGENCY MEDICINE

## 2023-10-25 PROCEDURE — 250N000013 HC RX MED GY IP 250 OP 250 PS 637: Performed by: EMERGENCY MEDICINE

## 2023-10-25 PROCEDURE — 83735 ASSAY OF MAGNESIUM: CPT | Performed by: INTERNAL MEDICINE

## 2023-10-25 PROCEDURE — 87040 BLOOD CULTURE FOR BACTERIA: CPT | Performed by: EMERGENCY MEDICINE

## 2023-10-25 RX ORDER — CARBAMAZEPINE 100 MG/5ML
150 SUSPENSION ORAL ONCE
Status: COMPLETED | OUTPATIENT
Start: 2023-10-25 | End: 2023-10-25

## 2023-10-25 RX ORDER — GUAIFENESIN 200 MG/10ML
200 LIQUID ORAL EVERY 4 HOURS PRN
Status: DISCONTINUED | OUTPATIENT
Start: 2023-10-25 | End: 2023-10-28 | Stop reason: HOSPADM

## 2023-10-25 RX ORDER — ALBUTEROL SULFATE 0.83 MG/ML
2.5 SOLUTION RESPIRATORY (INHALATION)
Status: DISCONTINUED | OUTPATIENT
Start: 2023-10-25 | End: 2023-10-28 | Stop reason: HOSPADM

## 2023-10-25 RX ORDER — PIPERACILLIN SODIUM, TAZOBACTAM SODIUM 4; .5 G/20ML; G/20ML
4.5 INJECTION, POWDER, LYOPHILIZED, FOR SOLUTION INTRAVENOUS ONCE
Status: COMPLETED | OUTPATIENT
Start: 2023-10-25 | End: 2023-10-25

## 2023-10-25 RX ORDER — SODIUM BICARBONATE 650 MG/1
650 TABLET ORAL 2 TIMES DAILY
Status: DISCONTINUED | OUTPATIENT
Start: 2023-10-25 | End: 2023-10-28 | Stop reason: HOSPADM

## 2023-10-25 RX ORDER — ONDANSETRON 2 MG/ML
4 INJECTION INTRAMUSCULAR; INTRAVENOUS EVERY 6 HOURS PRN
Status: DISCONTINUED | OUTPATIENT
Start: 2023-10-25 | End: 2023-10-28 | Stop reason: HOSPADM

## 2023-10-25 RX ORDER — VANCOMYCIN HYDROCHLORIDE 1 G/200ML
1000 INJECTION, SOLUTION INTRAVENOUS EVERY 12 HOURS
Status: DISCONTINUED | OUTPATIENT
Start: 2023-10-26 | End: 2023-10-27

## 2023-10-25 RX ORDER — ENOXAPARIN SODIUM 100 MG/ML
40 INJECTION SUBCUTANEOUS EVERY 24 HOURS
Status: DISCONTINUED | OUTPATIENT
Start: 2023-10-25 | End: 2023-10-28 | Stop reason: HOSPADM

## 2023-10-25 RX ORDER — SODIUM CHLORIDE 9 MG/ML
INJECTION, SOLUTION INTRAVENOUS CONTINUOUS
Status: ACTIVE | OUTPATIENT
Start: 2023-10-25 | End: 2023-10-26

## 2023-10-25 RX ORDER — ACETYLCYSTEINE 200 MG/ML
2 SOLUTION ORAL; RESPIRATORY (INHALATION) EVERY 4 HOURS
Status: DISCONTINUED | OUTPATIENT
Start: 2023-10-25 | End: 2023-10-28 | Stop reason: HOSPADM

## 2023-10-25 RX ORDER — CARBAMAZEPINE 100 MG/5ML
150 SUSPENSION ORAL 3 TIMES DAILY
Status: DISCONTINUED | OUTPATIENT
Start: 2023-10-26 | End: 2023-10-28 | Stop reason: HOSPADM

## 2023-10-25 RX ORDER — CARBAMAZEPINE 100 MG/5ML
100 SUSPENSION ORAL DAILY
Status: DISCONTINUED | OUTPATIENT
Start: 2023-10-25 | End: 2023-10-28 | Stop reason: HOSPADM

## 2023-10-25 RX ORDER — LEVOTHYROXINE SODIUM 137 UG/1
137 TABLET ORAL DAILY
Status: DISCONTINUED | OUTPATIENT
Start: 2023-10-26 | End: 2023-10-28 | Stop reason: HOSPADM

## 2023-10-25 RX ORDER — METOCLOPRAMIDE HYDROCHLORIDE 5 MG/5ML
10 SOLUTION ORAL
Status: DISCONTINUED | OUTPATIENT
Start: 2023-10-25 | End: 2023-10-28 | Stop reason: HOSPADM

## 2023-10-25 RX ORDER — DEXAMETHASONE SODIUM PHOSPHATE 10 MG/ML
10 INJECTION, SOLUTION INTRAMUSCULAR; INTRAVENOUS ONCE
Status: COMPLETED | OUTPATIENT
Start: 2023-10-25 | End: 2023-10-25

## 2023-10-25 RX ORDER — ONDANSETRON 4 MG/1
4 TABLET, ORALLY DISINTEGRATING ORAL EVERY 6 HOURS PRN
Status: DISCONTINUED | OUTPATIENT
Start: 2023-10-25 | End: 2023-10-28 | Stop reason: HOSPADM

## 2023-10-25 RX ORDER — DEXAMETHASONE SODIUM PHOSPHATE 10 MG/ML
6 INJECTION, SOLUTION INTRAMUSCULAR; INTRAVENOUS DAILY
Status: DISCONTINUED | OUTPATIENT
Start: 2023-10-26 | End: 2023-10-28 | Stop reason: HOSPADM

## 2023-10-25 RX ORDER — PIPERACILLIN SODIUM, TAZOBACTAM SODIUM 4; .5 G/20ML; G/20ML
4.5 INJECTION, POWDER, LYOPHILIZED, FOR SOLUTION INTRAVENOUS EVERY 6 HOURS
Status: DISCONTINUED | OUTPATIENT
Start: 2023-10-25 | End: 2023-10-28 | Stop reason: HOSPADM

## 2023-10-25 RX ADMIN — PIPERACILLIN AND TAZOBACTAM 4.5 G: 4; .5 INJECTION, POWDER, FOR SOLUTION INTRAVENOUS at 13:33

## 2023-10-25 RX ADMIN — SODIUM BICARBONATE 650 MG TABLET 650 MG: at 19:41

## 2023-10-25 RX ADMIN — VANCOMYCIN HYDROCHLORIDE 2000 MG: 10 INJECTION, POWDER, LYOPHILIZED, FOR SOLUTION INTRAVENOUS at 17:40

## 2023-10-25 RX ADMIN — SODIUM CHLORIDE 1000 ML: 9 INJECTION, SOLUTION INTRAVENOUS at 15:44

## 2023-10-25 RX ADMIN — ENOXAPARIN SODIUM 40 MG: 40 INJECTION SUBCUTANEOUS at 19:41

## 2023-10-25 RX ADMIN — SODIUM CHLORIDE 1000 ML: 9 INJECTION, SOLUTION INTRAVENOUS at 14:28

## 2023-10-25 RX ADMIN — ALBUTEROL SULFATE 2.5 MG: 2.5 SOLUTION RESPIRATORY (INHALATION) at 19:40

## 2023-10-25 RX ADMIN — DEXAMETHASONE SODIUM PHOSPHATE 10 MG: 10 INJECTION INTRAMUSCULAR; INTRAVENOUS at 13:35

## 2023-10-25 RX ADMIN — PIPERACILLIN AND TAZOBACTAM 4.5 G: 4; .5 INJECTION, POWDER, FOR SOLUTION INTRAVENOUS at 19:40

## 2023-10-25 RX ADMIN — BRIVARACETAM 100 MG: 10 SOLUTION ORAL at 19:41

## 2023-10-25 RX ADMIN — CARBAMAZEPINE 150 MG: 100 SUSPENSION ORAL at 16:43

## 2023-10-25 RX ADMIN — REMDESIVIR 200 MG: 100 INJECTION, POWDER, LYOPHILIZED, FOR SOLUTION INTRAVENOUS at 18:13

## 2023-10-25 RX ADMIN — ACETYLCYSTEINE 2 ML: 200 SOLUTION ORAL; RESPIRATORY (INHALATION) at 19:40

## 2023-10-25 RX ADMIN — METOCLOPRAMIDE HYDROCHLORIDE 10 MG: 5 SOLUTION ORAL at 22:57

## 2023-10-25 RX ADMIN — SODIUM CHLORIDE: 9 INJECTION, SOLUTION INTRAVENOUS at 17:21

## 2023-10-25 RX ADMIN — CARBAMAZEPINE 100 MG: 100 SUSPENSION ORAL at 22:57

## 2023-10-25 RX ADMIN — SODIUM CHLORIDE 1000 ML: 9 INJECTION, SOLUTION INTRAVENOUS at 13:33

## 2023-10-25 ASSESSMENT — ACTIVITIES OF DAILY LIVING (ADL)
ADLS_ACUITY_SCORE: 39
ADLS_ACUITY_SCORE: 37
ADLS_ACUITY_SCORE: 39
ADLS_ACUITY_SCORE: 37
ADLS_ACUITY_SCORE: 37
ADLS_ACUITY_SCORE: 39

## 2023-10-25 NOTE — ED NOTES
MD called to bedside due to pt pale and diaphoretic, staring but not making eye contact, or answering questions. Pulse weak and tachycardic. Wet cough. RT to bedside to suction large amount of thick yellow secretions. CXR coming to bedside at this time. Pt more alert after suctioning. SpO2 stable at 97% on 4 LPM. Likely mucous plug.

## 2023-10-25 NOTE — ED NOTES
Bed: ED26  Expected date:   Expected time:   Means of arrival:   Comments:  Kerri - 63 M fever covid positive eta 1591
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
## 2023-10-25 NOTE — ED NOTES
Incontinence care performed and pt repositioned to his left side with HOB raised. Pt has a large blanchable area of redness to his coccyx. Male purewick applied. Pt is alert, able to move his left foot on command, and able to move LUE. Pt alert and cooperative. Moderate amount of thick yellow secretions suctioned from the roof of patient's mouth. Respirations even and unlabored on 2 LPM O2 via NC.
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
## 2023-10-25 NOTE — PHARMACY-ADMISSION MEDICATION HISTORY
"Pharmacist Admission Medication History    Admission medication history is complete. The information provided in this note is only as accurate as the sources available at the time of the update.    Information Source(s): Family member and CareEverywhere/SureScripts via in-person    Pertinent Information:   -guaifenesin: as per med list, mother reports she has been crushing this 12-hour formulation for administration down feeding tube. Shared with mother that this formulation should not be crushed and that perhaps a liquid formulation could be used in the future.  -levothyroxine: there are two ambulatory prescriptions on list below for levothyroxine, one for 150 mcg tab and another for 137 mcg tab. The 150 mcg tab order has order date of 9/21 while the 137 mcg order has an order date of 9/20. Despite these dates, mother notes she was told to give the 137 mcg dose. Marked 150 mcg order as \"not taking.\"  -scopolamine: no fill records for this medication but mother reports they have this and use it  -testosterone: mother reports patient is due for this today  -mother reports she plans to start giving patient Tums for calcium but has not yet  -per mother, all enteral medications are going through feeding tube    Changes made to PTA medication list:  Added: None  Deleted: azelastine  Changed: albuterol neb (marked as not taking as mother reports they do not have a nebulizer machine), many meds from PO to per feeding tube (mother reports all enteral meds go through tube), guaifenesin (BID PRN --> BID, added update that mother reports she is crushing this for FT use as she was unaware it should not be crushed), hydrocortisone (5 mg from 2:00 --> 6:00PM, added note about dose adjustment for fever), levothyroxine 150 mcg (marked as NOT TAKING, see not above), metoclopramide (QID --> TID, updated times), miconazole (BID --> BID PRN), testosterone injection (added day of week = Wed), vitamin B12 (changed from SL tab to \"PO\" " tab, mother does not think she has a SL tab)    Medication Affordability:  Not including over the counter (OTC) medications, was there a time in the past 3 months when you did not take your medications as prescribed because of cost?: Unable to Assess    Allergies reviewed with patient and updates made in EHR: no, already reviewed by MD    Medication History Completed By: Christa Dumas RPH 10/25/2023 4:17 PM    Prior to Admission medications    Medication Sig Last Dose Taking? Auth Provider Long Term End Date   acetaminophen (TYLENOL) 500 MG tablet 500-750 mg by Per Feeding Tube route every 8 hours as needed for mild pain  at PRN Yes Unknown, Entered By History     bacitracin 500 UNIT/GM external ointment Apply topically daily as needed for wound care To PEG site.  at PRN Yes Elsa Queen MD     Brivaracetam (BRIVIACT) 10 MG/ML solution 100 mg by Oral or Feeding Tube route 2 times daily 0900, 2100 10/25/2023 at AM Yes Unknown, Entered By History No    carBAMazepine (TEGRETOL) 100 MG/5ML suspension 7.5 mLs (150 mg) by Oral or Feeding Tube route 3 times daily At 06:00, 12:00, and 24:00 for seizures 10/25/2023 at 0600 Yes Mark Causey PA-C Yes    carBAMazepine (TEGRETOL) 100 MG/5ML suspension 100 mg by Per Feeding Tube route daily Take at 1800 10/24/2023 at 1800 Yes Unknown, Entered By History Yes    COMPOUNDED NON-CONTROLLED SUBSTANCE (CMPD RX) - PHARMACY TO MIX COMPOUNDED MEDICATION Scopolamine 0.4mg capsules - take  2 capsule by feeding tube three times daily as needed  at PRN Yes Mark Causey PA-C     Cyanocobalamin (VITAMIN B-12 PO) 2,500 mcg by Per Feeding Tube route daily 10/25/2023 at AM Yes Unknown, Entered By History     glycopyrrolate (ROBINUL) 1 MG tablet Take 1 tablet (1 mg) by mouth 2 times daily as needed (secretions)  Patient taking differently: 1 mg by Per Feeding Tube route 2 times daily as needed (secretions)  at PRN Yes Elsa Queen MD No    guaiFENesin (MUCINEX) 600 MG 12 hr  tablet Take 1 tablet (600 mg) by mouth 2 times daily as needed for congestion  Patient taking differently: 600 mg 2 times daily Mother was not aware this formulation should not be crushed and has been putting down feeding tube. Made her aware not to crush this in future. 10/25/2023 at AM Yes Edita Pope MD No    hydrocortisone (CORTAID) 1 % external cream Apply topically 2 times daily as needed Apply to reddened memo areas as needed  at PRN Yes Unknown, Entered By History     hydrocortisone (CORTEF) 5 MG tablet Take 15 mg (3 tablets) in the morning and 7.5 mg (1.5 tablet)  at 2:00 PM. During illness patient takes more as a stress dose. Please increase the dose as directed.  Patient taking differently: Take 15 mg (3 tablets) in the morning and 5 mg (1 tablet)  at 6:00 PM. Per feeding tube. During illness patient takes more as a stress dose. Mother reports doubling each dose for fever 10/25/2023 at AM Yes Faizan Mclean MD Yes    levothyroxine (SYNTHROID/LEVOTHROID) 137 MCG tablet Take 1 tablet (137 mcg) by mouth daily 10/25/2023 at AM Yes Elsa Queen MD Yes    metoclopramide (REGLAN) 10 MG/10ML SOLN solution Take 10 mLs (10 mg) by mouth 4 times daily (before meals and nightly) 0800, 1200, 1600, 2000 Disconnects bag before administration, then waits 45 mins before reconnecting after giving the medication  Patient taking differently: Take 10 mg by mouth 3 times daily 0800, 1500, 2100 Disconnects bag before administration, then waits 45 mins before reconnecting after giving the medication 10/25/2023 at AM Yes Elsa Queen MD     miconazole (MICATIN) 2 % external powder Apply topically 2 times daily  Patient taking differently: Apply topically 2 times daily as needed  at PRN Yes Melisa Ash MD     multivitamin, therapeutic (THERA-VIT) TABS tablet 1 tablet by Per Feeding Tube route daily 10/25/2023 at AM Yes Reported, Patient     mupirocin (BACTROBAN) 2 % external ointment APPLY TOPICALLY TO THE  "AFFECTED AREA TWICE DAILY AS NEEDED  at PRN Yes Elsa Queen MD     pantoprazole (PROTONIX) 2 mg/mL SUSP suspension TAKE 20ML PER FEEDING TUBE DAILY 10/25/2023 at AM Yes Elsa Queen MD     sodium bicarbonate 650 MG tablet TAKE 1 TABLET(650 MG) BY MOUTH TWICE DAILY  Patient taking differently: 650 mg by Per Feeding Tube route 2 times daily 10/25/2023 at AM Yes Elsa Queen MD     testosterone cypionate (DEPOTESTOSTERONE) 200 MG/ML injection Inject 0.25 mLs (50 mg) into the muscle once a week  Patient taking differently: Inject 50 mg into the muscle once a week On Wednesdays Past Week Yes Faizan Mclean MD Yes    vitamin C (ASCORBIC ACID) 1000 MG TABS 1,000 mg by Oral or Feeding Tube route daily  10/25/2023 at AM Yes Unknown, Entered By History     vitamin D3 (CHOLECALCIFEROL) 2000 units (50 mcg) tablet 2,000 Units by Per Feeding Tube route daily 10/25/2023 at AM Yes Unknown, Entered By History No    albuterol (PROVENTIL) (5 MG/ML) 0.5% neb solution Take 0.5 mLs (2.5 mg) by nebulization every 6 hours as needed for shortness of breath, wheezing or cough 0700 1100 1500 1900 with mucomyst  Patient not taking: Reported on 10/25/2023 Not Taking  Edita Pope MD Yes    insulin syringe-needle U-100 (29G X 1/2\" 1 ML) 29G X 1/2\" 1 ML miscellaneous Syringe needle use as needed   Elsa Queen MD     levothyroxine (SYNTHROID/LEVOTHROID) 150 MCG tablet Take 1 tablet (150 mcg) by mouth daily Six days a week and take 1.5 tablet (225 mcg) one day a week.  Patient not taking: Reported on 10/25/2023 Not Taking  Faizan Mclean MD Yes            "
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
## 2023-10-26 LAB
ANION GAP SERPL CALCULATED.3IONS-SCNC: 10 MMOL/L (ref 7–15)
ATRIAL RATE - MUSE: 114 BPM
BACTERIA BLD CULT: NO GROWTH
BACTERIA BLD CULT: NO GROWTH
BUN SERPL-MCNC: 12.7 MG/DL (ref 8–23)
CALCIUM SERPL-MCNC: 8.2 MG/DL (ref 8.8–10.2)
CHLORIDE SERPL-SCNC: 108 MMOL/L (ref 98–107)
CREAT SERPL-MCNC: 0.81 MG/DL (ref 0.67–1.17)
DEPRECATED HCO3 PLAS-SCNC: 26 MMOL/L (ref 22–29)
DIASTOLIC BLOOD PRESSURE - MUSE: NORMAL MMHG
EGFRCR SERPLBLD CKD-EPI 2021: >90 ML/MIN/1.73M2
ERYTHROCYTE [DISTWIDTH] IN BLOOD BY AUTOMATED COUNT: 14.5 % (ref 10–15)
GLUCOSE BLDC GLUCOMTR-MCNC: 121 MG/DL (ref 70–99)
GLUCOSE SERPL-MCNC: 184 MG/DL (ref 70–99)
HCT VFR BLD AUTO: 39.7 % (ref 40–53)
HGB BLD-MCNC: 12.6 G/DL (ref 13.3–17.7)
INTERPRETATION ECG - MUSE: NORMAL
MAGNESIUM SERPL-MCNC: 2 MG/DL (ref 1.7–2.3)
MCH RBC QN AUTO: 29.4 PG (ref 26.5–33)
MCHC RBC AUTO-ENTMCNC: 31.7 G/DL (ref 31.5–36.5)
MCV RBC AUTO: 93 FL (ref 78–100)
P AXIS - MUSE: 64 DEGREES
PHOSPHATE SERPL-MCNC: 1.5 MG/DL (ref 2.5–4.5)
PHOSPHATE SERPL-MCNC: 1.6 MG/DL (ref 2.5–4.5)
PLATELET # BLD AUTO: 109 10E3/UL (ref 150–450)
POTASSIUM SERPL-SCNC: 3.9 MMOL/L (ref 3.4–5.3)
PR INTERVAL - MUSE: 160 MS
QRS DURATION - MUSE: 86 MS
QT - MUSE: 350 MS
QTC - MUSE: 482 MS
R AXIS - MUSE: -68 DEGREES
RBC # BLD AUTO: 4.29 10E6/UL (ref 4.4–5.9)
SODIUM SERPL-SCNC: 144 MMOL/L (ref 135–145)
SYSTOLIC BLOOD PRESSURE - MUSE: NORMAL MMHG
T AXIS - MUSE: 73 DEGREES
VENTRICULAR RATE- MUSE: 114 BPM
WBC # BLD AUTO: 7.5 10E3/UL (ref 4–11)

## 2023-10-26 PROCEDURE — 99232 SBSQ HOSP IP/OBS MODERATE 35: CPT | Performed by: STUDENT IN AN ORGANIZED HEALTH CARE EDUCATION/TRAINING PROGRAM

## 2023-10-26 PROCEDURE — 82947 ASSAY GLUCOSE BLOOD QUANT: CPT | Performed by: INTERNAL MEDICINE

## 2023-10-26 PROCEDURE — 84100 ASSAY OF PHOSPHORUS: CPT | Performed by: STUDENT IN AN ORGANIZED HEALTH CARE EDUCATION/TRAINING PROGRAM

## 2023-10-26 PROCEDURE — 258N000003 HC RX IP 258 OP 636: Performed by: INTERNAL MEDICINE

## 2023-10-26 PROCEDURE — 250N000011 HC RX IP 250 OP 636: Mod: JZ | Performed by: INTERNAL MEDICINE

## 2023-10-26 PROCEDURE — 94640 AIRWAY INHALATION TREATMENT: CPT | Mod: 76

## 2023-10-26 PROCEDURE — 94640 AIRWAY INHALATION TREATMENT: CPT

## 2023-10-26 PROCEDURE — 36415 COLL VENOUS BLD VENIPUNCTURE: CPT | Performed by: INTERNAL MEDICINE

## 2023-10-26 PROCEDURE — 250N000009 HC RX 250: Performed by: INTERNAL MEDICINE

## 2023-10-26 PROCEDURE — 85014 HEMATOCRIT: CPT | Performed by: INTERNAL MEDICINE

## 2023-10-26 PROCEDURE — 999N000157 HC STATISTIC RCP TIME EA 10 MIN

## 2023-10-26 PROCEDURE — 250N000013 HC RX MED GY IP 250 OP 250 PS 637: Performed by: INTERNAL MEDICINE

## 2023-10-26 PROCEDURE — 120N000001 HC R&B MED SURG/OB

## 2023-10-26 RX ORDER — DEXTROSE MONOHYDRATE 100 MG/ML
INJECTION, SOLUTION INTRAVENOUS CONTINUOUS PRN
Status: DISCONTINUED | OUTPATIENT
Start: 2023-10-26 | End: 2023-10-28 | Stop reason: HOSPADM

## 2023-10-26 RX ADMIN — DEXAMETHASONE SODIUM PHOSPHATE 6 MG: 10 INJECTION INTRAMUSCULAR; INTRAVENOUS at 13:36

## 2023-10-26 RX ADMIN — LEVOTHYROXINE SODIUM 137 MCG: 137 TABLET ORAL at 08:53

## 2023-10-26 RX ADMIN — PIPERACILLIN AND TAZOBACTAM 4.5 G: 4; .5 INJECTION, POWDER, FOR SOLUTION INTRAVENOUS at 21:25

## 2023-10-26 RX ADMIN — Medication 20 MG: at 08:51

## 2023-10-26 RX ADMIN — VANCOMYCIN HYDROCHLORIDE 1000 MG: 1 INJECTION, SOLUTION INTRAVENOUS at 18:30

## 2023-10-26 RX ADMIN — METOCLOPRAMIDE HYDROCHLORIDE 10 MG: 5 SOLUTION ORAL at 16:46

## 2023-10-26 RX ADMIN — BRIVARACETAM 100 MG: 10 SOLUTION ORAL at 20:58

## 2023-10-26 RX ADMIN — REMDESIVIR 100 MG: 100 INJECTION, POWDER, LYOPHILIZED, FOR SOLUTION INTRAVENOUS at 17:23

## 2023-10-26 RX ADMIN — ACETYLCYSTEINE 2 ML: 200 SOLUTION ORAL; RESPIRATORY (INHALATION) at 15:45

## 2023-10-26 RX ADMIN — CARBAMAZEPINE 150 MG: 100 SUSPENSION ORAL at 01:24

## 2023-10-26 RX ADMIN — METOCLOPRAMIDE HYDROCHLORIDE 10 MG: 5 SOLUTION ORAL at 20:58

## 2023-10-26 RX ADMIN — PIPERACILLIN AND TAZOBACTAM 4.5 G: 4; .5 INJECTION, POWDER, FOR SOLUTION INTRAVENOUS at 01:24

## 2023-10-26 RX ADMIN — ALBUTEROL SULFATE 2.5 MG: 2.5 SOLUTION RESPIRATORY (INHALATION) at 15:42

## 2023-10-26 RX ADMIN — ENOXAPARIN SODIUM 40 MG: 40 INJECTION SUBCUTANEOUS at 18:30

## 2023-10-26 RX ADMIN — ACETYLCYSTEINE 2 ML: 200 SOLUTION ORAL; RESPIRATORY (INHALATION) at 01:40

## 2023-10-26 RX ADMIN — ALBUTEROL SULFATE 2.5 MG: 2.5 SOLUTION RESPIRATORY (INHALATION) at 05:30

## 2023-10-26 RX ADMIN — PIPERACILLIN AND TAZOBACTAM 4.5 G: 4; .5 INJECTION, POWDER, FOR SOLUTION INTRAVENOUS at 16:10

## 2023-10-26 RX ADMIN — CARBAMAZEPINE 100 MG: 100 SUSPENSION ORAL at 20:32

## 2023-10-26 RX ADMIN — PIPERACILLIN AND TAZOBACTAM 4.5 G: 4; .5 INJECTION, POWDER, FOR SOLUTION INTRAVENOUS at 08:56

## 2023-10-26 RX ADMIN — METOCLOPRAMIDE HYDROCHLORIDE 10 MG: 5 SOLUTION ORAL at 08:53

## 2023-10-26 RX ADMIN — BRIVARACETAM 100 MG: 10 SOLUTION ORAL at 08:50

## 2023-10-26 RX ADMIN — ACETYLCYSTEINE 2 ML: 200 SOLUTION ORAL; RESPIRATORY (INHALATION) at 05:29

## 2023-10-26 RX ADMIN — SODIUM PHOSPHATE, MONOBASIC, MONOHYDRATE AND SODIUM PHOSPHATE, DIBASIC, ANHYDROUS 15 MMOL: 142; 276 INJECTION, SOLUTION INTRAVENOUS at 22:18

## 2023-10-26 RX ADMIN — VANCOMYCIN HYDROCHLORIDE 1000 MG: 1 INJECTION, SOLUTION INTRAVENOUS at 05:30

## 2023-10-26 RX ADMIN — CARBAMAZEPINE 150 MG: 100 SUSPENSION ORAL at 13:33

## 2023-10-26 RX ADMIN — SODIUM CHLORIDE 50 ML: 9 INJECTION, SOLUTION INTRAVENOUS at 17:23

## 2023-10-26 RX ADMIN — CARBAMAZEPINE 150 MG: 100 SUSPENSION ORAL at 05:45

## 2023-10-26 RX ADMIN — SODIUM BICARBONATE 650 MG TABLET 650 MG: at 20:28

## 2023-10-26 RX ADMIN — SODIUM BICARBONATE 650 MG TABLET 650 MG: at 08:53

## 2023-10-26 RX ADMIN — ALBUTEROL SULFATE 2.5 MG: 2.5 SOLUTION RESPIRATORY (INHALATION) at 01:31

## 2023-10-26 ASSESSMENT — ACTIVITIES OF DAILY LIVING (ADL)
ADLS_ACUITY_SCORE: 61
ADLS_ACUITY_SCORE: 39
ADLS_ACUITY_SCORE: 53
ADLS_ACUITY_SCORE: 61
ADLS_ACUITY_SCORE: 55
ADLS_ACUITY_SCORE: 49
ADLS_ACUITY_SCORE: 61
ADLS_ACUITY_SCORE: 55
ADLS_ACUITY_SCORE: 61
ADLS_ACUITY_SCORE: 55
ADLS_ACUITY_SCORE: 61
ADLS_ACUITY_SCORE: 55

## 2023-10-26 NOTE — PROGRESS NOTES
[facility]    Patient is currently receiving services with [facility]. The patient is currently receiving Skilled Nursing and PT services.  Patient's  and home health team have been notified that patient is under inpatient status [facility] Liaison will continue to follow patient during stay. Please provide orders to resume home care at time of discharge if appropriate.

## 2023-10-26 NOTE — PROGRESS NOTES
Pt unable to respond to orientation questions due to aphasia. VSS on 2L O2 via NC. PIV infusing NS @100ML/HR. Pt utilizing lift team X2 assist (Pt is Paralyzed w/ LUE having mobility). Pt utilizing male external cath. Pt incontinent of B&B. Gastro-Jejunostomy in place at [facility]. Pt NPO. Blanchable reddness noted on Coccyx area;mepelix placed. Droplet precautions initiated.

## 2023-10-26 NOTE — PHARMACY-VANCOMYCIN DOSING SERVICE
"Pharmacy Vancomycin Initial Note  Date of Service 2023  Patient's  1960  63 year old, male    Indication: Healthcare-Associated Pneumonia    Current estimated CrCl = Estimated Creatinine Clearance: 80.4 mL/min (based on SCr of 0.98 mg/dL).    Creatinine for last 3 days  10/25/2023: 12:58 PM Creatinine 0.98 mg/dL    Recent Vancomycin Level(s) for last 3 days  No results found for requested labs within last 3 days.      Vancomycin IV Administrations (past 72 hours)                     vancomycin (VANCOCIN) 2,000 mg in 0.9% NaCl 500 mL intermittent infusion (mg) 2,000 mg New Bag 10/25/23 1740                    Nephrotoxins and other renal medications (From now, onward)      Start     Dose/Rate Route Frequency Ordered Stop    10/26/23 0600  vancomycin (VANCOCIN) 1,000 mg in 200 mL dextrose intermittent infusion         1,000 mg  200 mL/hr over 1 Hours Intravenous EVERY 12 HOURS 10/25/23 2053      10/25/23 1930  piperacillin-tazobactam (ZOSYN) 4.5 g vial to attach to  mL bag        Note to Pharmacy: For [facility], [facility] and [facility]: For Zosyn-naive patients, use the \"Zosyn initial dose + extended infusion\" order panel.    4.5 g  over 30 Minutes Intravenous EVERY 6 HOURS 10/25/23 1645              Contrast Orders - past 72 hours (72h ago, onward)      None            Insight[employer] Prediction of Planned Initial Vancomycin Regimen  Loading dose: N/A  Regimen: 1000 mg IV every 12 hours.  Start time: 05:40 on 10/26/2023  Exposure target: AUC24 (range)400-600 mg/L.hr   AUC24,ss: 533 mg/L.hr  Probability of AUC24 > 400: 78 %  Ctrough,ss: 17.2 mg/L  Probability of Ctrough,ss > 20: 37 %  Probability of nephrotoxicity (Lodise ERNESTO ): 13 %        Plan:  Start vancomycin  1000 mg IV q12h.   Vancomycin monitoring method: AUC  Vancomycin therapeutic monitoring goal: 400-600 mg*h/L  Pharmacy will check vancomycin levels as appropriate in 1-3 Days.    Serum creatinine levels will be ordered daily for the first week of " therapy and at least twice weekly for subsequent weeks.      Karen Palomino, formerly [facility]

## 2023-10-26 NOTE — PROVIDER NOTIFICATION
To Dr Hernández via [email or web address] messaging at [phone]  Pharm sitcky note asks if you want d-dimer for pt being on lovenox with covid 19? Pharm also wants to know if you want a carbemazpine level? Last drawn in 2022. If so please put in orders, thank you.     MD read message at [phone]  RN at 151- Dietician wants to know if you want Ph replacement protocol? Ph 1.6 last night, Dietician ordered Ph to be collected now

## 2023-10-26 NOTE — PROGRESS NOTES
[facility]  Hospitalist Progress Note    Assessment & Plan     Keyon Farias is a 63 year old male with PMHx of TBI with aphasia, R sided spastic hemiplegia, chronic hemiplegia and chronic dysphagia with GJ-tube for TFs/meds, seizure disorder, panhypopituitarism and hx of prior hospitalizations for recurrent pneumonia (most recently in 9/2023) who was admitted on 10/25/2023 with worsening respiratory status in the setting of recently diagnosed COVID-19 infection.     Acute hypoxic respiratory failure secondary to COVID-19 infection  *Patient has longstanding history of recurrent hospitalizations frequently related to aspiration pneumonitis and pneumonia.  Was last hospitalized 9/9/23 to 9/13/23 with aspiration pneumonia.  He was maintained on IV Vanco and Zosyn during that stay and ultimately discharged on a 7-day course of Augmentin.  He was then rehospitalized from 9/25/2023 to 10/3/2023 with septic shock secondary to pneumonia.  He required intubation that stay.  Respiratory cultures grew MRSA and Pseudomonas.  He was managed with IV Vanco and Zosyn, was changed to Cipro. He was ultimately discharged on a 6-day course of ciprofloxacin.  *Patient was initially seen in the ED on 10/21/23 for evaluation of fever.  At that time Tmax was 101.7.  Work-up was done in the emergency room and he was found to be positive for COVID-19.  CT chest was obtained and showed findings of bronchitis and right lower lobe airspace inflammation.  At that time his respiratory status was noted to be stable on room air with no increased work of breathing.  He was given a dose of Decadron.  He is vaccinated against COVID.  Was felt to be stable for discharge home.  Unfortunately, he was not a candidate for Paxlovid given interaction with carbamazepine.  He was discharged home with recommendations to continue supportive cares.  On 10/25/23 AM, patient's mother noticed he had increased work of breathing and coarse  breath sounds prompting reevaluation in the ED.  *In ED, was afebrile.  Was mildly tachycardic but blood pressures were stable.  He was initially stable on 2 L nasal cannula.  Labs were notable for WBC 12.1.  Lactate was normal.  Electrolytes, renal function and LFTs were stable.  UA was bland.  Blood cultures x2 were obtained.  Chest x-ray showed persistent right basilar opacity without significant change compared to imaging done 1021/23 and again suggestive of bronchitis there was no airspace consolidation or pleural effusion.  He was given 10 mg of IV Decadron and started on Zosyn. While in the ED he was noted to have episode of significant increased work of breathing and decreased responsiveness.  RT was called, he was suctioned and a large mucous plug was removed.  Following suctioning he perked up again and respiratory status has since been stable on 2 L nasal cannula.   -- Continue  treatment for COVID infection with Remdesivir (no med interactions per pharmacy) and dexamethasone (given 10mg IV x1 in ED, can initiate 6mg daily dosing on 10/26)  -- blood cultures pending, started on Zosyn and Vancomycin on admission. Plan to de-escalate tomorrow if continues to improve  -- start mucomyst nebs, prn albuterol nebs  -- RT consult to continue suctioning as needed  -- cont supplemental O2, wean as able (does not use O2 at baseline)     Spastic hemiplegia  Traumatic brain injury  Nonverbal  Chronic right-sided paresis  Chronic dysphagia, s/p chronic GJ tube exchange on 9/21/23  * Bed-bound and essentially nonverbal at baseline. Takes meds and nutrition via G-tube.  * Chronic and stable on home meds, including Tegretol and Brivaracetam which have been continued  * Nutritionist consulted for TFs this stay.      Panhypopituitarism  Hypothyroidism  * Chronic and stable on home meds including hydrocortisone and Synthroid   -- cont synthroid  -- started on dexamethasone as above in lieu of hydrocortisone -- resume as  "condition stabilizes    Clinically Significant Risk Factors                         # Overweight: Estimated body mass index is 25.8 kg/m  as calculated from the following:    Height as of this encounter: 1.803 m (5' 11\").    Weight as of 10/4/23: 83.9 kg (185 lb)., PRESENT ON ADMISSION            Diet: NPO for Medical/Clinical Reasons Except for: No Exceptions  Adult Formula Drip Feeding: Continuous Jevity 1.5; Jejunostomy; Goal Rate: 55; mL/hr; start @ 15 mL/hr and keep for 24 hours. then advance by 20 mL q 12 hours to goal, as tolerated/pending labs. hold TF 1 hour before/after levothyroxine dose; Do n...     DVT Prophylaxis: Lovenox  Lerma Catheter: Not present  Lines: None     Cardiac Monitoring: ACTIVE order. Indication: COVID, hypoxia  Code Status: Full Code      Disposition Plan      Expected Discharge Date: 10/27/2023              Entered: Mark Hernández DO 10/26/2023, 4:25 PM        Mark Hernández DO  [facility]  Securely message with the Vocera Web Console (learn more here)  Text page via SMS [employer] Paging/Directory    Interval History   No acute events overnight  Patients mother and guardian is wondering if we can order Keyon suction at home   Patient resting comfortably breathing on room air   History limited due to the condition of the patient     -Data reviewed today: I reviewed all new labs and imaging results over the last 24 hours.    Physical Exam   Temp: 97.5  F (36.4  C) Temp src: Axillary BP: 109/62 Pulse: 67   Resp: 17 SpO2: 100 % O2 Device: None (Room air) Oxygen Delivery: 1 LPM  There were no vitals filed for this visit.  Vital Signs with Ranges  Temp:  [97.5  F (36.4  C)-97.7  F (36.5  C)] 97.5  F (36.4  C)  Pulse:  [67-89] 67  Resp:  [17-24] 17  BP: ()/(47-63) 109/62  SpO2:  [96 %-100 %] 100 %  I/O last 3 completed shifts:  In: 400 [NG/GT:150; IV Piggyback:250]  Out: 550 [Urine:550]    Constitutional: Non-verbal, NAD   ENT: Normocephalic, without obvious " abnormality, atraumatic   Pulmonary: coarse BS bilateral lower lung fields. No increased work of breathing. Occsaional cough  Cardiovascular: Regular rate and rhythm, normal S1 and S2, no S3 or S4, and no murmur noted.  GI: Normal bowel sounds, soft, non-distended, non-tender.  Skin/Integumen: Visualized skin appeared clear.    Medical Decision Making       Medications    dextrose        acetylcysteine  2 mL Nebulization Q4H    Brivaracetam  100 mg Oral or Feeding Tube BID    carBAMazepine  100 mg Per Feeding Tube Daily    carBAMazepine  150 mg Oral or Feeding Tube TID    dexAMETHasone  6 mg Intravenous Daily    enoxaparin ANTICOAGULANT  40 mg Subcutaneous Q24H    levothyroxine  137 mcg Oral or Feeding Tube Daily    metoclopramide  10 mg Oral TID AC    pantoprazole  20 mg Per Feeding Tube QAM AC    piperacillin-tazobactam  4.5 g Intravenous Q6H    remdesivir  100 mg Intravenous Q24H    And    sodium chloride 0.9%  50 mL Intravenous Q24H    sodium bicarbonate  650 mg Per Feeding Tube BID    vancomycin  1,000 mg Intravenous Q12H     Data   Recent Labs   Lab 10/26/23  0826 10/25/23  1258 10/21/23  1234   WBC  --  12.1* 8.2   HGB  --  14.4 14.1   MCV  --  89 88   PLT  --  144* 140*   NA  --  138 136   POTASSIUM  --  3.6 3.5   CHLORIDE  --  96* 92*   CO2  --  30* 34*   BUN  --  19.0 25.1*   CR  --  0.98 1.11   ANIONGAP  --  12 10   STEVE  --  8.8 8.6*   * 112* 115*   ALBUMIN  --  3.6 3.9   PROTTOTAL  --  8.0 7.4   BILITOTAL  --  0.6 0.4   ALKPHOS  --  109 116   ALT  --  19 21   AST  --  25 25     No results found for this or any previous visit (from the past 24 hour(s)).

## 2023-10-26 NOTE — PROGRESS NOTES
RECEIVING UNIT ED HANDOFF REVIEW    ED Nurse Handoff Report was reviewed by: Gege Sesay RN on October 25, 2023 at 7:58 PM

## 2023-10-26 NOTE — PLAN OF CARE
Goal Outcome Evaluation:    Summary: acute hypoxic respiratory failure, secondary to Covid (+)    DATE & TIME: 10/26/23 7748-5681    Cognitive Concerns/ Orientation: nonverbal, unable to asses  BEHAVIOR & AGGRESSION TOOL COLOR: green  CIWA SCORE: NA   ABNL VS/O2: VSS on RA  MOBILITY: total - lift (right sided spastic hemiplegia)  PAIN MANAGMENT: none this shift  DIET: NPO  BOWEL/BLADDER: inc. of B/B  ABNL LAB/BG: Phos 1.6 (ordered to be replaced @1930)  DRAIN/DEVICES: L PIV SL, intermittent abx. GJ tube - meds through G tube, continuous Juejunostomy tube feeds @ 15mL per hour with q4 100 mL free water flush (advance by 20 mL at 0030 am)  TELEMETRY RHYTHM: Normal Sinus  SKIN: redness blanchable on coccyx, mepilex applied C/D/I. Rash behind bilateral ears and face. Scattered bruises  TESTS/PROCEDURES: none  D/C DATE: pending improvement  OTHER IMPORTANT INFO: Savannah is mother who assists with cares at home and is legal

## 2023-10-26 NOTE — CONSULTS
CLINICAL NUTRITION SERVICES  -  ASSESSMENT NOTE      RECOMMENDATIONS FOR MD/PROVIDER TO ORDER:   RN managed phos replacement protocol for low phos     Recommendations Ordered by Registered Dietitian (RD):   Ordered home regimen of TF and FWF w/ labs, I/O, daily wt's  Ordered add-on of Mag, Phos labs   Addendum: received page from RN regarding low Phos. D/w RN-->  pt is refeeding risk w/ low phos --> adjusted TF advancement orders  RN to request replacement protocol from MD SOMERS added refeeding lab order set w/ updated phos add-on from morning labs    Type of Feeding Tube: Gastrojejunostomy   Enteral Frequency: Continuous   Enteral Regimen: Jevity 1.5 @ 55 mL/hr x 22 hrs (hold for 1 hour before/after levothyroxine)  --> start @ 15 mL/hr for 24 hours, advance by 20 mL q 12 hours to goal, as tolerated/pending labs  Total Enteral Provisions: 1210 mL, 1815 kcal (21 kcal/kg), 77 g protein (0.92 g/kg), 920 mL free water, 261 g CHO, and 25 g fiber daily  Free Water Flush: 100 mL q 4 hours  Total Free Water Provisions: 1520 mL daily     Malnutrition:   % Weight Loss:  None noted  % Intake:  unable to assess, suspect no decreased intake PTA. On TF at baseline  Subcutaneous Fat Loss:  unable to assess- COVID+  Muscle Loss:  unable to assess- COVID+  Fluid Retention:  None noted    Malnutrition Diagnosis: Unable to determine due to lack of nutrition hx and NFPA. Historically, pt does not meet criteria.          REASON FOR ASSESSMENT  Keyon Farias is a 63 year old male seen by Registered Dietitian for Provider Order - Registered Dietitian to Assess and Order TF per Medical Nutrition Therapy Protocol      NUTRITION HISTORY  Information obtained from chart review  Unable to obtain nutrition history from pt d/t hx of asaphia and COVID+ special precuations    PMH of Hx of TBI w/ aphasia, R sided spastic hemiplegia, chronic hemiplegia and chronic dysphagia s/p GJ-tube for TFs/meds, seizure disorder, panhypopituitarism, hx of prior  "hospitalizations for recurrent pneumonia   Social hx-- lives at home, caregiver is mother (Savannah)    Per chart review, pt is well known to [facility] RD services. Per [facility], assessed various times since 2016. Last assessed 09/29/2023. Pt was re-started on home TF regimen of Jevity 1.5 @ 55 mL/hr (x22 hrs - hold for 1 hr before and 1 hr after levothyroxine dose) via GJ-tube + FWF of 100 mL q 4 hours. Pt did not meet malnutrition criteria.       CURRENT NUTRITION ORDERS  Diet: NPO for Medical/Clinical Reasons Except for: No Exceptions        Current Intake/Tolerance:  RD consulted to re-start home TF regimen   No nutrition provisions yet, RD will re-order TF      PER CHART REVIEW  Admitted for:   Acute hypoxic respiratory failure secondary to COVID-19 infection     10/21: COVID+       NUTRITION FOCUSED PHYSICAL ASSESSMENT FOR DIAGNOSING MALNUTRITION)  No: Patient not available  COVID+              Observed:    Unable to assess    Obtained from Chart/Interdisciplinary Team:  None noted     ANTHROPOMETRICS  Height: 5' 11\"[per care everywhere[  Weight: 0 lbs 0 oz -- no admit wt on file --> ordered   83.9 kg (185 lb)  as of 10/4/2023   Body mass index is 25.8 kg/m .  Weight Status:  Overweight BMI 25-29.9  IBW: 78.2 kg  % IBW: 107%  Weight History:   Wt has been trending up over past year  10/04/23 83.9 kg (185 lb)   10/03/23 84.2 kg (185 lb 10 oz)   09/21/23 77.1 kg (170 lb)   09/12/23 76.8 kg (169 lb 5 oz)   08/12/23 78.4 kg (172 lb 13.5 oz)   05/10/23 72.6 kg (160 lb)   04/06/23 79.4 kg (175 lb)   03/10/23 73.9 kg (163 lb)   02/15/23 73.9 kg (163 lb)   01/23/23 63.5 kg (140 lb)   01/09/23 63.5 kg (140 lb)   11/28/22 64.4 kg (141 lb 14.4 oz)   06/27/22 69 kg (152 lb 1.9 oz)   06/07/22 71 kg (156 lb 8 oz)   05/11/22 68.1 kg (150 lb 3.2 oz)   03/18/22 72.6 kg (160 lb)   12/11/21 75.2 kg (165 lb 12.6 oz)   11/17/21 73.6 kg (162 lb 3.2 oz)   10/30/21 75.2 kg (165 lb 12.6 oz)   08/10/21 78.8 kg (173 lb 11.6 oz)   05/24/21 80.1 kg " (176 lb 8 oz)   04/16/21 76 kg (167 lb 8.8 oz)   02/22/21 74.8 kg (165 lb)   01/18/21 74.2 kg (163 lb 9.3 oz)   12/20/20 77.2 kg (170 lb 1.6 oz)     Care everywhere--  02/02/23 : 74.8 kg (165 lb)      LABS  Lab 10/26/23  0826 10/25/23  1258 10/21/23  1234   * 112* 115*      MEDICATIONS   dexAMETHasone  6 mg Intravenous Daily    levothyroxine  137 mcg Oral or Feeding Tube Daily    metoclopramide  10 mg Oral TID AC    pantoprazole  20 mg Per Feeding Tube QAM AC    sodium bicarbonate  650 mg Per Feeding Tube BID         ASSESSED NUTRITION NEEDS PER APPROVED PRACTICE GUIDELINES:  Dosing Weight: 83.9 kg (10/4)  Estimated Energy Needs: 3031-3889 kcal (20-25 Kcal/Kg)  Justification: maintenance r/t BMI  Estimated Protein Needs: 67-84 grams protein (0.8-1 g pro/Kg)  Justification: maintenance   Estimated Fluid Needs: 1 mL/kcal  Justification: maintenance      MALNUTRITION:  % Weight Loss:  None noted  % Intake:  unable to assess, suspect no decreased intake PTA. On TF at baseline  Subcutaneous Fat Loss:  unable to assess- COVID+  Muscle Loss:  unable to assess- COVID+  Fluid Retention:  None noted    Malnutrition Diagnosis: Unable to determine due to lack of nutrition hx and NFPA. Historically, pt does not meet criteria.       NUTRITION DIAGNOSIS:  Inadequate enteral nutrition infusion related to hold of TF w/ admit as evidenced by no nutrition since admit x1 day, plans to re-start home TF this AM        NUTRITION INTERVENTIONS  Recommendations / Nutrition Prescription  Ordered home regimen of TF and FWF w/ labs, I/O, daily wt's  Ordered add-on of Mag, Phos labs     Implementation  Enteral Nutrition - Initiate  Feeding tube flush  Wt order    Nutrition Goals  TF to reach goal rate w/in 48 hours  TF to provide % of estimated nutrition needs at goal rate        MONITORING AND EVALUATION:  Progress towards goals will be monitored and evaluated per protocol and Practice Guidelines      Ashley Olsen, RD, LD   Pager:  [phone]

## 2023-10-26 NOTE — PROGRESS NOTES
Admission    Patient arrives to room 618 via cart from [facility].  Care plan note: patient nonverbal. VSS on RA. Double skin check completed with SI. Rash behind ears and face. Scattered bruises on back, arms and right wrist. Right second toe with redness. Mepilex in place on blanchable redness on coccyx, not opened. On continuous tube feeding through jejunostomy @ 15 mL/hr. NPO per medical guidelines. FLACC scale shows no signs of pain.    Inpatient nursing criteria listed below were met:    Did you put disposition on whiteboard and in sticky note: Yes  Full skin assessment done (add LDA if skin issue present). Initials of 2nd RN: Yes, SI  Isolation education started/completed Yes  Patient allergies verified with patient: Yes  Fall Risk? (Care plan updated, Education given and documented) Yes  Primary Care Plan initiated: Yes  Home medications documented in belongings flowsheet: NA  Patient belongings documented in belongings flowsheet: NA  Reminder note (belongings/ medications) placed in discharge instructions:NA  Admission profile/ required documentation complete: Yes  If patient is a 72 hour hold/Commitment are belongings removed from room and locked up? NA

## 2023-10-26 NOTE — PLAN OF CARE
Goal Outcome Evaluation:    Denies CP, SOB. RN gave report to RN on Med Surg 66 unit and they okayed him to come up. Pt belongings left with pt. Pt mother reported pt coming in with black shirt, RN unable to find in room. Tube feed started at 1230 per order. Tube feeding started by Darrin CHRISTINE float resource.

## 2023-10-27 LAB
CARBAMAZEPINE SERPL-MCNC: 10.4 UG/ML (ref 4–12)
D DIMER PPP FEU-MCNC: 0.61 UG/ML FEU (ref 0–0.5)
GLUCOSE SERPL-MCNC: 81 MG/DL (ref 70–99)
MAGNESIUM SERPL-MCNC: 2.2 MG/DL (ref 1.7–2.3)
PHOSPHATE SERPL-MCNC: 2.8 MG/DL (ref 2.5–4.5)
VANCOMYCIN SERPL-MCNC: 19.9 UG/ML

## 2023-10-27 PROCEDURE — 250N000009 HC RX 250: Performed by: INTERNAL MEDICINE

## 2023-10-27 PROCEDURE — 999N000157 HC STATISTIC RCP TIME EA 10 MIN

## 2023-10-27 PROCEDURE — 83735 ASSAY OF MAGNESIUM: CPT | Performed by: INTERNAL MEDICINE

## 2023-10-27 PROCEDURE — 36415 COLL VENOUS BLD VENIPUNCTURE: CPT | Performed by: STUDENT IN AN ORGANIZED HEALTH CARE EDUCATION/TRAINING PROGRAM

## 2023-10-27 PROCEDURE — 80156 ASSAY CARBAMAZEPINE TOTAL: CPT | Performed by: STUDENT IN AN ORGANIZED HEALTH CARE EDUCATION/TRAINING PROGRAM

## 2023-10-27 PROCEDURE — 36415 COLL VENOUS BLD VENIPUNCTURE: CPT | Performed by: INTERNAL MEDICINE

## 2023-10-27 PROCEDURE — 250N000011 HC RX IP 250 OP 636: Performed by: INTERNAL MEDICINE

## 2023-10-27 PROCEDURE — 80202 ASSAY OF VANCOMYCIN: CPT | Performed by: INTERNAL MEDICINE

## 2023-10-27 PROCEDURE — 82947 ASSAY GLUCOSE BLOOD QUANT: CPT | Performed by: STUDENT IN AN ORGANIZED HEALTH CARE EDUCATION/TRAINING PROGRAM

## 2023-10-27 PROCEDURE — 84100 ASSAY OF PHOSPHORUS: CPT | Performed by: INTERNAL MEDICINE

## 2023-10-27 PROCEDURE — 258N000003 HC RX IP 258 OP 636: Performed by: INTERNAL MEDICINE

## 2023-10-27 PROCEDURE — 250N000013 HC RX MED GY IP 250 OP 250 PS 637: Performed by: INTERNAL MEDICINE

## 2023-10-27 PROCEDURE — 85379 FIBRIN DEGRADATION QUANT: CPT | Performed by: STUDENT IN AN ORGANIZED HEALTH CARE EDUCATION/TRAINING PROGRAM

## 2023-10-27 PROCEDURE — 99232 SBSQ HOSP IP/OBS MODERATE 35: CPT | Performed by: STUDENT IN AN ORGANIZED HEALTH CARE EDUCATION/TRAINING PROGRAM

## 2023-10-27 PROCEDURE — 94640 AIRWAY INHALATION TREATMENT: CPT | Mod: 76

## 2023-10-27 PROCEDURE — 94640 AIRWAY INHALATION TREATMENT: CPT

## 2023-10-27 PROCEDURE — 120N000001 HC R&B MED SURG/OB

## 2023-10-27 RX ADMIN — ENOXAPARIN SODIUM 40 MG: 40 INJECTION SUBCUTANEOUS at 18:28

## 2023-10-27 RX ADMIN — ACETYLCYSTEINE 2 ML: 200 SOLUTION ORAL; RESPIRATORY (INHALATION) at 16:05

## 2023-10-27 RX ADMIN — CARBAMAZEPINE 150 MG: 100 SUSPENSION ORAL at 12:49

## 2023-10-27 RX ADMIN — Medication 20 MG: at 08:59

## 2023-10-27 RX ADMIN — SODIUM CHLORIDE 50 ML: 9 INJECTION, SOLUTION INTRAVENOUS at 18:23

## 2023-10-27 RX ADMIN — CARBAMAZEPINE 150 MG: 100 SUSPENSION ORAL at 05:37

## 2023-10-27 RX ADMIN — BRIVARACETAM 100 MG: 10 SOLUTION ORAL at 09:12

## 2023-10-27 RX ADMIN — ALBUTEROL SULFATE 2.5 MG: 2.5 SOLUTION RESPIRATORY (INHALATION) at 16:06

## 2023-10-27 RX ADMIN — ALBUTEROL SULFATE 2.5 MG: 2.5 SOLUTION RESPIRATORY (INHALATION) at 08:59

## 2023-10-27 RX ADMIN — POTASSIUM & SODIUM PHOSPHATES POWDER PACK 280-160-250 MG 1 PACKET: 280-160-250 PACK at 10:55

## 2023-10-27 RX ADMIN — ALBUTEROL SULFATE 2.5 MG: 2.5 SOLUTION RESPIRATORY (INHALATION) at 19:47

## 2023-10-27 RX ADMIN — PIPERACILLIN AND TAZOBACTAM 4.5 G: 4; .5 INJECTION, POWDER, FOR SOLUTION INTRAVENOUS at 20:32

## 2023-10-27 RX ADMIN — CARBAMAZEPINE 100 MG: 100 SUSPENSION ORAL at 18:28

## 2023-10-27 RX ADMIN — ACETYLCYSTEINE 2 ML: 200 SOLUTION ORAL; RESPIRATORY (INHALATION) at 19:47

## 2023-10-27 RX ADMIN — METOCLOPRAMIDE HYDROCHLORIDE 10 MG: 5 SOLUTION ORAL at 09:11

## 2023-10-27 RX ADMIN — CARBAMAZEPINE 150 MG: 100 SUSPENSION ORAL at 23:15

## 2023-10-27 RX ADMIN — DEXAMETHASONE SODIUM PHOSPHATE 6 MG: 10 INJECTION INTRAMUSCULAR; INTRAVENOUS at 12:47

## 2023-10-27 RX ADMIN — ACETYLCYSTEINE 2 ML: 200 SOLUTION ORAL; RESPIRATORY (INHALATION) at 08:59

## 2023-10-27 RX ADMIN — PIPERACILLIN AND TAZOBACTAM 4.5 G: 4; .5 INJECTION, POWDER, FOR SOLUTION INTRAVENOUS at 15:41

## 2023-10-27 RX ADMIN — POTASSIUM & SODIUM PHOSPHATES POWDER PACK 280-160-250 MG 1 PACKET: 280-160-250 PACK at 15:41

## 2023-10-27 RX ADMIN — METOCLOPRAMIDE HYDROCHLORIDE 10 MG: 5 SOLUTION ORAL at 20:32

## 2023-10-27 RX ADMIN — POTASSIUM & SODIUM PHOSPHATES POWDER PACK 280-160-250 MG 1 PACKET: 280-160-250 PACK at 21:11

## 2023-10-27 RX ADMIN — METOCLOPRAMIDE HYDROCHLORIDE 10 MG: 5 SOLUTION ORAL at 15:41

## 2023-10-27 RX ADMIN — LEVOTHYROXINE SODIUM 137 MCG: 137 TABLET ORAL at 08:58

## 2023-10-27 RX ADMIN — SODIUM BICARBONATE 650 MG TABLET 650 MG: at 20:32

## 2023-10-27 RX ADMIN — BRIVARACETAM 100 MG: 10 SOLUTION ORAL at 21:08

## 2023-10-27 RX ADMIN — PIPERACILLIN AND TAZOBACTAM 4.5 G: 4; .5 INJECTION, POWDER, FOR SOLUTION INTRAVENOUS at 02:34

## 2023-10-27 RX ADMIN — CARBAMAZEPINE 150 MG: 100 SUSPENSION ORAL at 00:25

## 2023-10-27 RX ADMIN — SODIUM BICARBONATE 650 MG TABLET 650 MG: at 09:11

## 2023-10-27 RX ADMIN — VANCOMYCIN HYDROCHLORIDE 750 MG: 1 INJECTION, POWDER, LYOPHILIZED, FOR SOLUTION INTRAVENOUS at 21:07

## 2023-10-27 RX ADMIN — PIPERACILLIN AND TAZOBACTAM 4.5 G: 4; .5 INJECTION, POWDER, FOR SOLUTION INTRAVENOUS at 09:08

## 2023-10-27 RX ADMIN — VANCOMYCIN HYDROCHLORIDE 1000 MG: 1 INJECTION, SOLUTION INTRAVENOUS at 05:37

## 2023-10-27 RX ADMIN — REMDESIVIR 100 MG: 100 INJECTION, POWDER, LYOPHILIZED, FOR SOLUTION INTRAVENOUS at 18:24

## 2023-10-27 ASSESSMENT — ACTIVITIES OF DAILY LIVING (ADL)
ADLS_ACUITY_SCORE: 61
ADLS_ACUITY_SCORE: 59
ADLS_ACUITY_SCORE: 61
ADLS_ACUITY_SCORE: 61
ADLS_ACUITY_SCORE: 65
ADLS_ACUITY_SCORE: 61
ADLS_ACUITY_SCORE: 59
ADLS_ACUITY_SCORE: 59
ADLS_ACUITY_SCORE: 61
ADLS_ACUITY_SCORE: 59
ADLS_ACUITY_SCORE: 61
ADLS_ACUITY_SCORE: 67

## 2023-10-27 NOTE — PLAN OF CARE
Cognitive Concerns/ Orientation: nonverbal, unable to asses  BEHAVIOR & AGGRESSION TOOL COLOR: green  CIWA SCORE: NA   ABNL VS/O2: VSS on RA  MOBILITY: total - lift (right sided spastic hemiplegia)  PAIN MANAGMENT: none this shift  DIET: NPO  BOWEL/BLADDER: inc. of B/B  ABNL LAB/BG: Phos 1.6 (replaced per protocol)  DRAIN/DEVICES: L PIV SL, intermittent abx. GJ tube - meds through G tube, continuous Juejunostomy tube feeds @ 15mL per hour with q4 100 mL free water flush (advance by 20 mL at 1225 pm). GJ tube on hold for (0640) levothyroxine meds.  hold TF 1 hour before/after levothyroxine dose.  TELEMETRY RHYTHM: NS  SKIN: redness blanchable on coccyx, mepilex applied C/D/I. Rash behind bilateral ears and face. Scattered bruises  TESTS/PROCEDURES: none  D/C DATE: pending improvement  OTHER IMPORTANT INFO: Savannah is mother who assists with cares at home and is legal

## 2023-10-27 NOTE — PROGRESS NOTES
[facility]  Hospitalist Progress Note    Assessment & Plan     Keyon Farias is a 63 year old male with PMHx of TBI with aphasia, R sided spastic hemiplegia, chronic hemiplegia and chronic dysphagia with GJ-tube for TFs/meds, seizure disorder, panhypopituitarism and hx of prior hospitalizations for recurrent pneumonia (most recently in 9/2023) who was admitted on 10/25/2023 with worsening respiratory status in the setting of recently diagnosed COVID-19 infection. Today 10/27 is breathing comfortable off oxygen. Anticipating discharge home over the weekend, 10/28.     Acute hypoxic respiratory failure secondary to COVID-19 infection  *Patient has longstanding history of recurrent hospitalizations frequently related to aspiration pneumonitis and pneumonia.  Was last hospitalized 9/9/23 to 9/13/23 with aspiration pneumonia.  He was maintained on IV Vanco and Zosyn during that stay and ultimately discharged on a 7-day course of Augmentin.  He was then rehospitalized from 9/25/2023 to 10/3/2023 with septic shock secondary to pneumonia.  He required intubation that stay.  Respiratory cultures grew MRSA and Pseudomonas.  He was managed with IV Vanco and Zosyn, was changed to Cipro. He was ultimately discharged on a 6-day course of ciprofloxacin.  *Patient was initially seen in the ED on 10/21/23 for evaluation of fever.  At that time Tmax was 101.7.  Work-up was done in the emergency room and he was found to be positive for COVID-19.  CT chest was obtained and showed findings of bronchitis and right lower lobe airspace inflammation.  At that time his respiratory status was noted to be stable on room air with no increased work of breathing.  He was given a dose of Decadron.  He is vaccinated against COVID.  Was felt to be stable for discharge home.  Unfortunately, he was not a candidate for Paxlovid given interaction with carbamazepine.  He was discharged home with recommendations to continue  supportive cares.  On 10/25/23 AM, patient's mother noticed he had increased work of breathing and coarse breath sounds prompting reevaluation in the ED.  *In ED, was afebrile.  Was mildly tachycardic but blood pressures were stable.  He was initially stable on 2 L nasal cannula.  Labs were notable for WBC 12.1.  Lactate was normal.  Electrolytes, renal function and LFTs were stable.  UA was bland.  Blood cultures x2 were obtained.  Chest x-ray showed persistent right basilar opacity without significant change compared to imaging done 1021/23 and again suggestive of bronchitis there was no airspace consolidation or pleural effusion.  He was given 10 mg of IV Decadron and started on Zosyn. While in the ED he was noted to have episode of significant increased work of breathing and decreased responsiveness.  RT was called, he was suctioned and a large mucous plug was removed.  Following suctioning he perked up again and respiratory status has since been stable on 2 L nasal cannula.   -- Continue  treatment for COVID infection with Remdesivir (no med interactions per pharmacy) and dexamethasone (given 10mg IV x1 in ED, can initiate 6mg daily dosing on 10/26)  -- blood cultures pending, started on Zosyn and Vancomycin on admission. Plan to de-escalate tomorrow if continues to improve  -- start mucomyst nebs, prn albuterol nebs  -- RT consult to continue suctioning as needed  -- cont supplemental O2, wean as able (does not use O2 at baseline)     Spastic hemiplegia  Traumatic brain injury  Nonverbal  Chronic right-sided paresis  Chronic dysphagia, s/p chronic GJ tube exchange on 9/21/23  * Bed-bound and essentially nonverbal at baseline. Takes meds and nutrition via G-tube.  * Chronic and stable on home meds, including Tegretol and Brivaracetam which have been continued  * Nutritionist consulted for TFs this stay.      Panhypopituitarism  Hypothyroidism  * Chronic and stable on home meds including hydrocortisone and  Synthroid   -- cont synthroid  -- started on dexamethasone as above in lieu of hydrocortisone -- resume as condition stabilizes    Clinically Significant Risk Factors                # Thrombocytopenia: Lowest platelets = 109 in last 2 days, will monitor for bleeding                     Diet: NPO for Medical/Clinical Reasons Except for: No Exceptions  Adult Formula Drip Feeding: Continuous Jevity 1.5; Jejunostomy; Goal Rate: 50; mL/hr; OK to turn TF up to 35 ml/hr now, advance to goal after 12 hours.     DVT Prophylaxis: Lovenox  Lerma Catheter: Not present  Lines: None     Cardiac Monitoring: None  Code Status: Full Code      Disposition Plan       Expected Discharge Date: 10/29/2023              Entered: Mark Hernández DO 10/27/2023, 5:05 PM        Mark Hernández DO  [facility]  Securely message with the Vocera Web Console (learn more here)  Text page via [employer] Paging/Directory    Interval History   - No acute events overnight  - Patient breathing comfortably, playing with ipad when writer arrives to room  - Waves hello, otherwise non-verbal  - History limited due to the condition of the patient     -Data reviewed today: I reviewed all new labs and imaging results over the last 24 hours.    Physical Exam   Temp: 97.6  F (36.4  C) Temp src: Axillary BP: 113/60 Pulse: 70   Resp: 18 SpO2: 98 % O2 Device: None (Room air)    Vitals:    10/26/23 1700   Weight: 70.8 kg (156 lb 1.4 oz)     Vital Signs with Ranges  Temp:  [97.5  F (36.4  C)-97.7  F (36.5  C)] 97.6  F (36.4  C)  Pulse:  [70-79] 70  Resp:  [18-20] 18  BP: ()/(55-60) 113/60  SpO2:  [98 %-99 %] 98 %  I/O last 3 completed shifts:  In: 370 [NG/GT:370]  Out: 900 [Urine:900]    Constitutional: Non-verbal, NAD   ENT: Normocephalic, without obvious abnormality, atraumatic   Pulmonary: coarse BS bilateral lower lung fields. No increased work of breathing. Occsaional cough  Cardiovascular: Regular rate and rhythm, normal S1 and S2,  no S3 or S4, and no murmur noted.  GI: Normal bowel sounds, soft, non-distended, non-tender.  Skin/Integumen: Visualized skin appeared clear.    Medical Decision Making       Medications    dextrose        acetylcysteine  2 mL Nebulization Q4H    Brivaracetam  100 mg Oral or Feeding Tube BID    carBAMazepine  100 mg Per Feeding Tube Daily    carBAMazepine  150 mg Oral or Feeding Tube TID    dexAMETHasone  6 mg Intravenous Daily    enoxaparin ANTICOAGULANT  40 mg Subcutaneous Q24H    levothyroxine  137 mcg Oral or Feeding Tube Daily    metoclopramide  10 mg Oral TID AC    pantoprazole  20 mg Per Feeding Tube QAM AC    piperacillin-tazobactam  4.5 g Intravenous Q6H    potassium & sodium phosphates  1 packet Oral TID    remdesivir  100 mg Intravenous Q24H    And    sodium chloride 0.9%  50 mL Intravenous Q24H    sodium bicarbonate  650 mg Per Feeding Tube BID    vancomycin  1,000 mg Intravenous Q12H     Data   Recent Labs   Lab 10/27/23  1203 10/26/23  1919 10/26/23  0826 10/25/23  1258 10/21/23  1234   WBC  --  7.5  --  12.1* 8.2   HGB  --  12.6*  --  14.4 14.1   MCV  --  93  --  89 88   PLT  --  109*  --  144* 140*   NA  --  144  --  138 136   POTASSIUM  --  3.9  --  3.6 3.5   CHLORIDE  --  108*  --  96* 92*   CO2  --  26  --  30* 34*   BUN  --  12.7  --  19.0 25.1*   CR  --  0.81  --  0.98 1.11   ANIONGAP  --  10  --  12 10   STEVE  --  8.2*  --  8.8 8.6*   GLC 81 184* 121* 112* 115*   ALBUMIN  --   --   --  3.6 3.9   PROTTOTAL  --   --   --  8.0 7.4   BILITOTAL  --   --   --  0.6 0.4   ALKPHOS  --   --   --  109 116   ALT  --   --   --  19 21   AST  --   --   --  25 25     No results found for this or any previous visit (from the past 24 hour(s)).    Mark Hernández DO

## 2023-10-27 NOTE — DISCHARGE INSTRUCTIONS
Jevity 1.5 @ 50 mL/hr x 22 hours to provide = 1100 mL, 1650 kcal (24 kcal/kg), 70 g pro (1 g/kg), 238 g CHO, 23 g fiber, 836 mL free water.   Flush 100 q 4 hours.   Hold TF for 1 hour prior to and post levothyroxine.   NeutraPhos TID (to be ordered at discharge)

## 2023-10-27 NOTE — PROGRESS NOTES
Clinical Nutrition Therapy Brief Note  - Reviewed labs, noted Phos lower today : 1.6 --> 1.5 (L), pt was given a dose of sodium phosphate last night.   - From past hospitalizations, recall that patient receives NeutraPhos packets at baseline for low-trending phos levels.   - Called patient's mother, Savannah, today to confirm. She states that she had to stop giving him Phos replacements as they were too expensive & she was always paying out-of-pocket.   - Savannah also mentions that she runs Keyon's TF during the day only, at a rate of 75 ml/hr x 12 hours. Provides 900 mL, 1350 kcal (19 kcal/kg), 58 g protein (0.82).     Reassessed Nutrition Needs with new dosing weight:   DW: 70 kg (10/27)  Estimated Energy Needs: 0714-5453 kcal (20-25 Kcal/Kg)  Justification: maintenance r/t BMI  Estimated Protein Needs: 56-70 grams protein (0.8-1 g pro/Kg)  Justification: maintenance       Electrolytes    Phosphorus (mg/dL)   Date Value   10/26/2023 1.5 (L)   10/25/2023 1.6 (L)   10/03/2023 1.8 (L)   10/02/2023 2.8   10/01/2023 1.5 (L)   05/24/2021 2.4 (L)   01/18/2021 2.9   01/15/2021 2.6   12/19/2020 2.8   11/06/2020 2.0 (L)              Intervention:   - Discussed w/ pt's mother (as above).   - Ordered TID NeutraPhos pkts per previous dosage (per staff rec).   - Slight decrease to goal rate given newly assessed nutrition needs (above).     Jevity 1.5 @ 50 mL/hr x 22 hours to provide = 1100 mL, 1650 kcal (24 kcal/kg), 70 g pro (1 g/kg), 238 g CHO, 23 g fiber, 836 mL free water.   Flush 100 q 4 hours.   Hold TF for 1 hour prior to and post levothyroxine.   NeutraPhos TID (to be ordered at discharge)     Please page w/ questions.     Marisel Fernández RD, LD  Pager: [phone]  Weekend Pager: [phone]

## 2023-10-28 VITALS
BODY MASS INDEX: 21.85 KG/M2 | OXYGEN SATURATION: 97 % | WEIGHT: 156.09 LBS | TEMPERATURE: 97.5 F | HEART RATE: 62 BPM | RESPIRATION RATE: 10 BRPM | HEIGHT: 71 IN | DIASTOLIC BLOOD PRESSURE: 70 MMHG | SYSTOLIC BLOOD PRESSURE: 133 MMHG

## 2023-10-28 LAB
CREAT SERPL-MCNC: 0.82 MG/DL (ref 0.67–1.17)
EGFRCR SERPLBLD CKD-EPI 2021: >90 ML/MIN/1.73M2
ERYTHROCYTE [DISTWIDTH] IN BLOOD BY AUTOMATED COUNT: 14.6 % (ref 10–15)
GLUCOSE SERPL-MCNC: 91 MG/DL (ref 70–99)
HCT VFR BLD AUTO: 37.1 % (ref 40–53)
HGB BLD-MCNC: 11.9 G/DL (ref 13.3–17.7)
MAGNESIUM SERPL-MCNC: 2.2 MG/DL (ref 1.7–2.3)
MCH RBC QN AUTO: 29.2 PG (ref 26.5–33)
MCHC RBC AUTO-ENTMCNC: 32.1 G/DL (ref 31.5–36.5)
MCV RBC AUTO: 91 FL (ref 78–100)
PHOSPHATE SERPL-MCNC: 2.7 MG/DL (ref 2.5–4.5)
PLATELET # BLD AUTO: 146 10E3/UL (ref 150–450)
RBC # BLD AUTO: 4.07 10E6/UL (ref 4.4–5.9)
WBC # BLD AUTO: 5.6 10E3/UL (ref 4–11)

## 2023-10-28 PROCEDURE — 250N000009 HC RX 250: Performed by: INTERNAL MEDICINE

## 2023-10-28 PROCEDURE — 94640 AIRWAY INHALATION TREATMENT: CPT

## 2023-10-28 PROCEDURE — 250N000013 HC RX MED GY IP 250 OP 250 PS 637: Performed by: INTERNAL MEDICINE

## 2023-10-28 PROCEDURE — 82947 ASSAY GLUCOSE BLOOD QUANT: CPT | Performed by: STUDENT IN AN ORGANIZED HEALTH CARE EDUCATION/TRAINING PROGRAM

## 2023-10-28 PROCEDURE — 99239 HOSP IP/OBS DSCHRG MGMT >30: CPT | Performed by: STUDENT IN AN ORGANIZED HEALTH CARE EDUCATION/TRAINING PROGRAM

## 2023-10-28 PROCEDURE — 83735 ASSAY OF MAGNESIUM: CPT | Performed by: INTERNAL MEDICINE

## 2023-10-28 PROCEDURE — 999N000157 HC STATISTIC RCP TIME EA 10 MIN

## 2023-10-28 PROCEDURE — 85014 HEMATOCRIT: CPT | Performed by: STUDENT IN AN ORGANIZED HEALTH CARE EDUCATION/TRAINING PROGRAM

## 2023-10-28 PROCEDURE — 250N000011 HC RX IP 250 OP 636: Mod: JZ | Performed by: INTERNAL MEDICINE

## 2023-10-28 PROCEDURE — 94640 AIRWAY INHALATION TREATMENT: CPT | Mod: 76

## 2023-10-28 PROCEDURE — 36415 COLL VENOUS BLD VENIPUNCTURE: CPT | Performed by: STUDENT IN AN ORGANIZED HEALTH CARE EDUCATION/TRAINING PROGRAM

## 2023-10-28 PROCEDURE — 84100 ASSAY OF PHOSPHORUS: CPT | Performed by: INTERNAL MEDICINE

## 2023-10-28 PROCEDURE — 258N000003 HC RX IP 258 OP 636: Performed by: INTERNAL MEDICINE

## 2023-10-28 PROCEDURE — 82565 ASSAY OF CREATININE: CPT | Performed by: INTERNAL MEDICINE

## 2023-10-28 RX ADMIN — Medication 20 MG: at 06:17

## 2023-10-28 RX ADMIN — PIPERACILLIN AND TAZOBACTAM 4.5 G: 4; .5 INJECTION, POWDER, FOR SOLUTION INTRAVENOUS at 02:11

## 2023-10-28 RX ADMIN — PIPERACILLIN AND TAZOBACTAM 4.5 G: 4; .5 INJECTION, POWDER, FOR SOLUTION INTRAVENOUS at 10:14

## 2023-10-28 RX ADMIN — VANCOMYCIN HYDROCHLORIDE 750 MG: 1 INJECTION, POWDER, LYOPHILIZED, FOR SOLUTION INTRAVENOUS at 08:36

## 2023-10-28 RX ADMIN — CARBAMAZEPINE 150 MG: 100 SUSPENSION ORAL at 13:58

## 2023-10-28 RX ADMIN — SODIUM BICARBONATE 650 MG TABLET 650 MG: at 08:38

## 2023-10-28 RX ADMIN — ACETYLCYSTEINE 2 ML: 200 SOLUTION ORAL; RESPIRATORY (INHALATION) at 07:58

## 2023-10-28 RX ADMIN — LEVOTHYROXINE SODIUM 137 MCG: 137 TABLET ORAL at 10:11

## 2023-10-28 RX ADMIN — POTASSIUM & SODIUM PHOSPHATES POWDER PACK 280-160-250 MG 1 PACKET: 280-160-250 PACK at 08:39

## 2023-10-28 RX ADMIN — BRIVARACETAM 100 MG: 10 SOLUTION ORAL at 08:38

## 2023-10-28 RX ADMIN — ALBUTEROL SULFATE 2.5 MG: 2.5 SOLUTION RESPIRATORY (INHALATION) at 07:58

## 2023-10-28 RX ADMIN — CARBAMAZEPINE 150 MG: 100 SUSPENSION ORAL at 06:17

## 2023-10-28 RX ADMIN — METOCLOPRAMIDE HYDROCHLORIDE 10 MG: 5 SOLUTION ORAL at 08:38

## 2023-10-28 RX ADMIN — ALBUTEROL SULFATE 2.5 MG: 2.5 SOLUTION RESPIRATORY (INHALATION) at 00:39

## 2023-10-28 RX ADMIN — ACETYLCYSTEINE 2 ML: 200 SOLUTION ORAL; RESPIRATORY (INHALATION) at 00:39

## 2023-10-28 RX ADMIN — DEXAMETHASONE SODIUM PHOSPHATE 6 MG: 10 INJECTION INTRAMUSCULAR; INTRAVENOUS at 13:48

## 2023-10-28 ASSESSMENT — ACTIVITIES OF DAILY LIVING (ADL)
ADLS_ACUITY_SCORE: 67
ADLS_ACUITY_SCORE: 67
DEPENDENT_IADLS:: CLEANING;COOKING;LAUNDRY;SHOPPING;MEAL PREPARATION;MEDICATION MANAGEMENT;MONEY MANAGEMENT;TRANSPORTATION;INCONTINENCE
ADLS_ACUITY_SCORE: 63
ADLS_ACUITY_SCORE: 67
ADLS_ACUITY_SCORE: 63
ADLS_ACUITY_SCORE: 67

## 2023-10-28 NOTE — PHARMACY-VANCOMYCIN DOSING SERVICE
"Pharmacy Vancomycin Note  Date of Service 2023  Patient's  1960   63 year old, male    Indication: Healthcare-Associated Pneumonia  Day of Therapy: 3  Current vancomycin regimen:  1000 mg IV q12h  Current vancomycin monitoring method: AUC  Current vancomycin therapeutic monitoring goal: 400-600 mg*h/L    InsightRX Prediction of Current Vancomycin Regimen  Loading dose: N/A  Regimen: 1000 mg IV every 12 hours.  Start time: 05:40 on 10/26/2023  Exposure target: AUC24 (range)400-600 mg/L.hr   AUC24,ss: 533 mg/L.hr  Probability of AUC24 > 400: 78 %  Ctrough,ss: 17.2 mg/L  Probability of Ctrough,ss > 20: 37 %  Probability of nephrotoxicity (Lodise ERNESTO ): 13 %    Current estimated CrCl = Estimated Creatinine Clearance: 95.9 mL/min (based on SCr of 0.81 mg/dL).    Creatinine for last 3 days  10/25/2023: 12:58 PM Creatinine 0.98 mg/dL  10/26/2023:  7:19 PM Creatinine 0.81 mg/dL    Recent Vancomycin Levels (past 3 days)  10/27/2023:  6:31 PM Vancomycin 19.9 ug/mL    Vancomycin IV Administrations (past 72 hours)                     vancomycin (VANCOCIN) 1,000 mg in 200 mL dextrose intermittent infusion (mg) 1,000 mg New Bag 10/27/23 0537     1,000 mg New Bag 10/26/23 1830     1,000 mg New Bag  0530    vancomycin (VANCOCIN) 2,000 mg in 0.9% NaCl 500 mL intermittent infusion (mg) 2,000 mg New Bag 10/25/23 1740                    Nephrotoxins and other renal medications (From now, onward)      Start     Dose/Rate Route Frequency Ordered Stop    10/25/23 1930  piperacillin-tazobactam (ZOSYN) 4.5 g vial to attach to  mL bag        Note to Pharmacy: For [facility], [facility] and [facility]: For Zosyn-naive patients, use the \"Zosyn initial dose + extended infusion\" order panel.    4.5 g  over 30 Minutes Intravenous EVERY 6 HOURS 10/25/23 1645                 Contrast Orders - past 72 hours (72h ago, onward)      None            Interpretation of levels and current regimen:  Vancomycin level is reflective of AUC greater " than 600    Has serum creatinine changed greater than 50% in last 72 hours: No    Renal Function: Stable    InsightRX Prediction of Planned New Vancomycin Regimen  Loading dose: N/A  Regimen: 750 mg IV every 12 hours.  Start time: 19:30 on 10/27/2023  Exposure target: AUC24 (range)400-600 mg/L.hr   AUC24,ss: 464 mg/L.hr  Probability of AUC24 > 400: 81 %  Ctrough,ss: 15 mg/L  Probability of Ctrough,ss > 20: 8 %  Probability of nephrotoxicity (Lodise ERNESTO 2009): 10 %      Plan:  Decrease Dose to Vancomycin 750 mg q12h  Vancomycin monitoring method: AUC  Vancomycin therapeutic monitoring goal: 400-600 mg*h/L  Pharmacy will check vancomycin levels as appropriate in 1-3 Days.  Serum creatinine levels will be ordered daily for the first week of therapy and at least twice weekly for subsequent weeks.    Rob Hernandez RPH

## 2023-10-28 NOTE — PLAN OF CARE
Summary: Acute hypoxic respiratory failure, secondary to Covid (+)  DATE & TIME: 10/27/23 4497-4732    Cognitive Concerns/ Orientation: Nonverbal, alert, follows some commands. Appeared at baseline this morning. Napping on and off in the afternoon/evening.   BEHAVIOR & AGGRESSION TOOL COLOR: Green   ABNL VS/O2: VSS on RA, bradycardic at times  MOBILITY: Total - lift (right sided spastic hemiplegia). Turn and reposition q2hrs.   PAIN MANAGMENT: Shaked head no when asked, no signs of pain.  DIET: NPO. TF @ goal rate of 50ml/hr since 0100. Free water flushes ordered as well.   BOWEL/BLADDER: Incontinent of B/B, large loose watery BM x1.   ABNL LAB/BG: D-dimer 0.61 and BGM 81.  DRAIN/DEVICES: PIV SL. GJ tube - meds through G tube, continuous Jejunostomy tube feeds. External male catheter.   TELEMETRY RHYTHM: N/A.   SKIN: Reddened/flushed face, otherwise pale. Redness blanchable on coccyx, mepilex changed. Rash behind bilateral ears and face. Scattered bruises.   TESTS/PROCEDURES: None  D/C DATE: Per MD note maybe tomorrow.  OTHER IMPORTANT INFO: Special and Contact precautions in place. Savannah is mother and legal guardian, was here twice today. Pt stable. Infrequent, cough. Congested but non-productive.

## 2023-10-28 NOTE — CONSULTS
Care Management Initial Consult    General Information  Assessment completed with: Parents, Savannah  Type of CM/SW Visit: Initial Assessment    Primary Care Provider verified and updated as needed: Yes   Readmission within the last 30 days: current reason for admission unrelated to previous admission   Return Category: New Diagnosis (covid positive)     Advance Care Planning: Advance Care Planning Reviewed: no concerns identified          Communication Assessment  Patient's communication style: spoken language (English or Bilingual)    Hearing Difficulty or Deaf: no   Wear Glasses or Blind: no    Cognitive  Cognitive/Neuro/Behavioral: .WDL except  Level of Consciousness: alert  Arousal Level: opens eyes spontaneously  Orientation: other (see comments) (HECTOR, nonverbal)  Mood/Behavior: behavior appropriate to situation  Best Language: 3 - Mute  Speech: illogical, unable to speak    Living Environment:   People in home: parent(s)     Current living Arrangements: house      Able to return to prior arrangements:         Family/Social Support:  Care provided by: other (see comments) (mom)  Provides care for:    Marital Status: Single             Description of Support System:           Current Resources:   Patient receiving home care services: Yes  Skilled Home Care Services: Skilled Nursing, Physical Therapy  Community Resources: Home Care  Equipment currently used at home: hospital bed, lift device, wheelchair, manual  Supplies currently used at home: Incontinence Supplies, Enteral Nutrition & Supplies    Employment/Financial:  Employment Status: disabled        Financial Concerns: none           Does the patient's insurance plan have a 3 day qualifying hospital stay waiver?  No    Lifestyle & Psychosocial Needs:  Social Determinants of Health     Food Insecurity: Not on file   Depression: Not at risk (9/20/2023)    PHQ-2     PHQ-2 Score: 0   Housing Stability: Not on file   Tobacco Use: Medium Risk (10/10/2023)     Patient History     Smoking Tobacco Use: Former     Smokeless Tobacco Use: Never     Passive Exposure: Not on file   Financial Resource Strain: Not on file   Alcohol Use: Not on file   Transportation Needs: Not on file   Physical Activity: Not on file   Interpersonal Safety: Not on file   Stress: Not on file   Social Connections: Not on file       Functional Status:  Prior to admission patient needed assistance:   Dependent ADLs:: Wheelchair-with assist, Transfers, Positioning, Incontinence, Grooming, Eating, Dressing, Bathing, Toileting  Dependent IADLs:: Cleaning, Cooking, Laundry, Shopping, Meal Preparation, Medication Management, Money Management, Transportation, Incontinence       Mental Health Status:          Chemical Dependency Status:                Values/Beliefs:  Spiritual, Cultural Beliefs, [employer]al Practices, Values that affect care: no               Additional Information:  Patient ready for discharge today.  Spoke with mother Savannah.  She was concerned about his respiratory status and whether he received all 3 doses of medication for Covid.  He has been on room air for more than 2 days with a oxygen sat in the upper 90's.  He has received all 3 doses of Remdevisir.  She was also asking to have a DME of a home suction machine.  She states the one she had was not functioning and she threw it away some time ago.  Dr. Hernández will include a DME for home suction on the discharge.  She has tube feeding supplies.  Patient is open with [facility].    Pilar Benavides RN  Inpatient Float Care Coordinator

## 2023-10-28 NOTE — PROGRESS NOTES
Care Management Discharge Note    Discharge Date: 10/28/2023       Discharge Disposition:      Discharge Services:      Discharge DME:      Discharge Transportation: agency    Private pay costs discussed:     Does the patient's insurance plan have a 3 day qualifying hospital stay waiver?  No    PAS Confirmation Code:    Patient/family educated on Medicare website which has current facility and service quality ratings:      Education Provided on the Discharge Plan:    Persons Notified of Discharge Plans: Mother Savannah  Patient/Family in Agreement with the Plan: yes    Handoff Referral Completed: Yes    Additional Information:  Patient ready for discharge today.  Mother is okay with return to home this afternoon.  Stretcher transportation has been set up between 2:40-3:25 pm this afternoon.  [facility] will resume home care RN/PT; a message was left with [facility] that patient will discharge today.  DME for home suction device was ordered.  Called [facility] today and they can supply once the order has been run through insurance on Monday; so delivery likely Tuesday or Wednesday - Mother Savannah is agreeable to this.  The DME order and facesheet was faxed to [facility] at 2:10 pm today.  No PCP appointment was made, see AVS.    Pilar Benavides RN  Inpatient Float Care Coordinator

## 2023-10-28 NOTE — PLAN OF CARE
Discharge    Patient discharged to home via stretcher with  Covocative transport.    Care plan note     Summary: Acute hypoxic respiratory failure, secondary to Covid (+)  DATE & TIME: 10/28/23 0091-5592               Cognitive Concerns/ Orientation: Nonverbal, alert, follows some commands. Appears at baseline.    BEHAVIOR & AGGRESSION TOOL COLOR: Green             ABNL VS/O2: VSS on RA, bradycardic at times  MOBILITY: Total - lift (right sided spastic hemiplegia). Turn and reposition q2hrs.   PAIN MANAGMENT: Shaked head no when asked, no signs of pain.  DIET: NPO. TF @ goal rate of 50 mL/hr. Free water flushes ordered as well.   BOWEL/BLADDER: Incontinent of B/B, no BM prior to discharge.   ABNL LAB/BG: Hgb 11.9  DRAIN/DEVICES: PIV SL and removed. GJ tube - meds through G tube, continuous Jejunostomy tube feeds. External male catheter removed prior to discharge.  TELEMETRY RHYTHM: N/A.   SKIN: Reddened/flushed face, otherwise pale. Redness blanchable on coccyx, mepilex changed. Rash behind bilateral ears and face. Scattered bruises.   TESTS/PROCEDURES: None  D/C DATE: Discharging back home this afternoon.   OTHER IMPORTANT INFO: Special and Contact precautions in place. Savannah is mother and legal guardian, spoke with her over phone and sent AVS home with pt. Pt stable with no changes. Infrequent, cough. Congested but non-productive. Refusing oral cares.           Listed belongings gathered and given to patient (including from security/pharmacy). Yes  Care Plan and Patient education resolved: Yes  Prescriptions if needed, hard copies sent with patient  NA  Medication Bin checked and emptied on discharge Yes  SW/care coordinator/charge RN aware of discharge: Yes

## 2023-10-28 NOTE — PLAN OF CARE
Summary: Acute hypoxic respiratory failure, secondary to Covid (+)  DATE & TIME: 10/27/23 4891-8829    Cognitive Concerns/ Orientation: Nonverbal, alert, follows some commands. Appears at baseline this morning. Napping on and off in the afternoon/evening.   BEHAVIOR & AGGRESSION TOOL COLOR: Green   ABNL VS/O2: VSS on RA  MOBILITY: Total - lift (right sided spastic hemiplegia). Turn and reposition q2hrs.   PAIN MANAGMENT: Shaked head no when asked, no signs of pain.  DIET: NPO. TF running @ 35 mL/hr, needs to be increased to 50 mL around 0100 (12 hrs after writer increased). Free water flushes ordered as well.   BOWEL/BLADDER: Incontinent of B/B, loose watery BM x1.   ABNL LAB/BG: D-dimer 0.61 and BGM 81.  DRAIN/DEVICES: PIV SL. GJ tube - meds through G tube, continuous Jejunostomy tube feeds. External male catheter.   TELEMETRY RHYTHM: Not ordered, removed.   SKIN: Reddened/flushed face, otherwise pale. Redness blanchable on coccyx, mepilex changed. Rash behind bilateral ears and face. Scattered bruises.   TESTS/PROCEDURES: None  D/C DATE: Per MD note maybe tomorrow.  OTHER IMPORTANT INFO: Special and Contact precautions in place. Savannah is mother and legal guardian, was here twice today. Pt stable. Infrequent, cough. Congested but non-productive. PCDs placed.

## 2023-10-28 NOTE — PROGRESS NOTES
Nebulizer treatment started this morning as ordered. Patient refused mid treatment    Sammy Dumas, RT

## 2023-10-28 NOTE — DISCHARGE SUMMARY
[facility]  Hospitalist Discharge Summary     Admit Date: 10/25/2023  Discharge Date:   10/28/2023  4:16 PM  Discharging Provider: Mark Hernández DO    PRIMARY CARE PROVIDER:    Elsa Queen     DISCHARGE DIAGNOSES:   Acute hypoxic respiratory failure secondary to COVID-19 infection  Spastic hemiplegia  Traumatic brain injury  Nonverbal  Chronic right-sided paresis  Chronic dysphagia, s/p chronic GJ tube exchange on 9/21/23  Panhypopituitarism  Hypothyroidism    BRIEF HISTORY OF PRESENT ILLNESS:      Keyon Farias is a 63 year old male with PMHx of TBI with aphasia, R sided spastic hemiplegia, chronic hemiplegia and chronic dysphagia with GJ-tube for TFs/meds, seizure disorder, panhypopituitarism and hx of prior hospitalizations for recurrent pneumonia (most recently in 9/2023) who was admitted on 10/25/2023 with worsening respiratory status in the setting of recently diagnosed COVID-19 infection.    HOSPITAL COURSE BY PROBLEM:      Acute hypoxic respiratory failure secondary to COVID-19 infection  *Patient has longstanding history of recurrent hospitalizations frequently related to aspiration pneumonitis and pneumonia.  Was last hospitalized 9/9/23 to 9/13/23 with aspiration pneumonia.  He was maintained on IV Vanco and Zosyn during that stay and ultimately discharged on a 7-day course of Augmentin.  He was then rehospitalized from 9/25/2023 to 10/3/2023 with septic shock secondary to pneumonia.  He required intubation that stay.  Respiratory cultures grew MRSA and Pseudomonas.  He was managed with IV Vanco and Zosyn, was changed to Cipro. He was ultimately discharged on a 6-day course of ciprofloxacin.  *Patient was initially seen in the ED on 10/21/23 for evaluation of fever.  At that time Tmax was 101.7.  Work-up was done in the emergency room and he was found to be positive for COVID-19.  CT chest was obtained and showed findings of bronchitis and right lower lobe airspace  inflammation.  At that time his respiratory status was noted to be stable on room air with no increased work of breathing.  He was given a dose of Decadron.  He is vaccinated against COVID.  Was felt to be stable for discharge home.  Unfortunately, he was not a candidate for Paxlovid given interaction with carbamazepine.  He was discharged home with recommendations to continue supportive cares.  On 10/25/23 AM, patient's mother noticed he had increased work of breathing and coarse breath sounds prompting reevaluation in the ED.  *In ED, was afebrile.  Was mildly tachycardic but blood pressures were stable.  He was initially stable on 2 L nasal cannula.  Labs were notable for WBC 12.1.  Lactate was normal.  Electrolytes, renal function and LFTs were stable.  UA was bland.  Blood cultures x2 were obtained.  Chest x-ray showed persistent right basilar opacity without significant change compared to imaging done 1021/23 and again suggestive of bronchitis there was no airspace consolidation or pleural effusion.  He was given 10 mg of IV Decadron and started on Zosyn. While in the ED he was noted to have episode of significant increased work of breathing and decreased responsiveness.  RT was called, he was suctioned and a large mucous plug was removed.  Following suctioning he perked up again and respiratory status has since been stable on 2 L nasal cannula.   -- Received dexamethasone during hospitalization, discontinued on discharge and resumed PTA steroids for panhypopituitarism   -- Unable to do paxlovid due to carbamazepine   -- finished 3 days of remdesivir   -- blood cultures x2 with no growth after three days  -- I discussed with patients mother that we did not suspect a bacterial infection to be contributing to his presentation. Procalcitonin was 0.1. He was recently treated for MRSA and pseudomonas pneumonia during last admission. His WBC was not elevated and he was afebrile during this admission. He received  Vancomycin and Zosyn during admission, which were discontinued on discharge. Discussed return precautions to ED if he develops fever or worsening respiratory distress and cough.   -- ordered suction machine for patient at discharge, suspect excess secretions were contributing to initial presentation  -- resume home PT and home nursing   -- Not requiring O2 at discharge      Spastic hemiplegia  Traumatic brain injury  Nonverbal  Chronic right-sided paresis  Chronic dysphagia, s/p chronic GJ tube exchange on 9/21/23  * Bed-bound and essentially nonverbal at baseline. Takes meds and nutrition via G-tube.  * Chronic and stable on home meds, including Tegretol and Brivaracetam which have been continued  * Nutritionist consulted for TFs this stay.      Panhypopituitarism  Hypothyroidism  * Chronic and stable on home meds including hydrocortisone and Synthroid   -- cont synthroid  -- started on dexamethasone as above in lieu of hydrocortisone -- resumed hydrocortisone on discharge      Other ANANYA medications resumed highlighted below      Clinically Significant Risk Factors                             # Financial/Environmental Concerns: none         Mark Hernández [facility]  Securely message with the Vocera Web Console (learn more here)  Text page via [employer] Paging/Directory    DISCHARGE MEDICATIONS:       Review of your medicines        UNREVIEWED medicines. Ask your doctor about these medicines        Dose / Directions   albuterol (5 MG/ML) 0.5% neb solution  Commonly known as: PROVENTIL  Used for: Aspiration pneumonitis (H)      Dose: 2.5 mg  Take 0.5 mLs (2.5 mg) by nebulization every 6 hours as needed for shortness of breath, wheezing or cough 0700 1100 1500 1900 with mucomyst  Quantity: 240 mL  Refills: 0            CONTINUE these medicines which may have CHANGED, or have new prescriptions. If we are uncertain of the size of tablets/capsules you have at home, strength may be listed as  something that might have changed.        Dose / Directions   glycopyrrolate 1 MG tablet  Commonly known as: ROBINUL  This may have changed: how to take this  Used for: Excessive oral secretions      Dose: 1 mg  Take 1 tablet (1 mg) by mouth 2 times daily as needed (secretions)  Quantity: 180 tablet  Refills: 1     guaiFENesin 600 MG 12 hr tablet  Commonly known as: MUCINEX  This may have changed:   how to take this  when to take this  additional instructions  Used for: Aspiration pneumonitis (H)      Dose: 600 mg  Take 1 tablet (600 mg) by mouth 2 times daily as needed for congestion  Quantity: 60 tablet  Refills: 0     hydrocortisone 5 MG tablet  Commonly known as: CORTEF  This may have changed: additional instructions  Used for: Secondary adrenal insufficiency (H24)      Take 15 mg (3 tablets) in the morning and 7.5 mg (1.5 tablet)  at 2:00 PM. During illness patient takes more as a stress dose. Please increase the dose as directed.  Quantity: 400 tablet  Refills: 3     levothyroxine 137 MCG tablet  Commonly known as: SYNTHROID/LEVOTHROID  This may have changed: Another medication with the same name was removed. Continue taking this medication, and follow the directions you see here.  Used for: Hypothyroidism, unspecified type      Dose: 137 mcg  Take 1 tablet (137 mcg) by mouth daily  Quantity: 90 tablet  Refills: 0     metoclopramide 10 MG/10ML Soln solution  Commonly known as: REGLAN  This may have changed:   when to take this  additional instructions  Used for: Aspiration pneumonitis (H)      Dose: 10 mg  Take 10 mLs (10 mg) by mouth 4 times daily (before meals and nightly) 0800, 1200, 1600, 2000 Disconnects bag before administration, then waits 45 mins before reconnecting after giving the medication  Quantity: 2365 mL  Refills: 5     miconazole 2 % external powder  Commonly known as: MICATIN  This may have changed:   when to take this  reasons to take this  Used for: Recurrent pneumonia      Apply topically  2 times daily  Quantity: 43 g  Refills: 0     sodium bicarbonate 650 MG tablet  This may have changed: See the new instructions.  Used for: Encephalopathy      TAKE 1 TABLET(650 MG) BY MOUTH TWICE DAILY  Quantity: 180 tablet  Refills: 3     testosterone cypionate 200 MG/ML injection  Commonly known as: DEPOTESTOSTERONE  This may have changed: additional instructions  Used for: Panhypopituitarism (H24), Hypogonadism male      Dose: 50 mg  Inject 0.25 mLs (50 mg) into the muscle once a week  Quantity: 4 mL  Refills: 3            CONTINUE these medicines which have NOT CHANGED        Dose / Directions   acetaminophen 500 MG tablet  Commonly known as: TYLENOL      Dose: 500-750 mg  500-750 mg by Per Feeding Tube route every 8 hours as needed for mild pain  Refills: 0     bacitracin 500 UNIT/GM external ointment  Used for: G tube feedings (H)      Apply topically daily as needed for wound care To PEG site.  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)      Dose: 150 mg  7.5 mLs (150 mg) by Oral or Feeding Tube route 3 times daily At 06:00, 12:00, and 24:00 for seizures  Quantity: 2700 mL  Refills: 1     COMPOUNDED NON-CONTROLLED SUBSTANCE - PHARMACY TO MIX COMPOUNDED MEDICATION  Commonly known as: CMPD RX  Used for: Excessive oral secretions      Scopolamine 0.4mg capsules - take  2 capsule by feeding tube three times daily as needed  Quantity: 90 capsule  Refills: 1     hydrocortisone 1 % external cream  Commonly known as: CORTAID      Apply topically 2 times daily as needed Apply  "to reddened memo areas as needed  Refills: 0     insulin syringe-needle U-100 29G X 1/2\" 1 ML miscellaneous  Commonly known as: 29G X 1/2\" 1 ML  Used for: Panhypopituitarism (H24)      Syringe needle use as needed  Quantity: 200 each  Refills: 11     multivitamin, therapeutic Tabs tablet      Dose: 1 tablet  1 tablet by Per Feeding Tube route daily  Refills: 0     mupirocin 2 % external ointment  Commonly known as: BACTROBAN  Used for: Panhypopituitarism (H24), Hypogonadism male      APPLY TOPICALLY TO THE AFFECTED AREA TWICE DAILY AS NEEDED  Quantity: 30 g  Refills: 1     pantoprazole 2 mg/mL Susp suspension  Commonly known as: PROTONIX  Used for: Gastroesophageal reflux disease      TAKE 20ML PER FEEDING TUBE DAILY  Quantity: 600 mL  Refills: 3     VITAMIN B-12 PO      Dose: 2,500 mcg  2,500 mcg by Per Feeding Tube route daily  Refills: 0     vitamin C 1000 MG Tabs  Commonly known as: ASCORBIC ACID      Dose: 1,000 mg  1,000 mg by Oral or Feeding Tube route daily  Refills: 0     vitamin D3 50 mcg (2000 units) tablet  Commonly known as: CHOLECALCIFEROL      Dose: 2,000 Units  2,000 Units by Per Feeding Tube route daily  Refills: 0           * This list has 2 medication(s) that are the same as other medications prescribed for you. Read the directions carefully, and ask your doctor or other care provider to review them with you.                 ALLERGIES:    Allergies   Allergen Reactions    Valproic Acid Other (See Comments)     Toxicity w/ bone marrow suspension, elevated ammonia levels     Dilantin [Phenytoin Sodium] Other (See Comments)     Severe Trembling    Scopolamine Hives     Hives with the patch - oral no problem     DISPOSITION:    Discharged to home  Condition at discharge: Stable        Medication Therapy Management Referral      Home Care Referral      Reason for your hospital stay    You were hospitalized for covid pneumonia.     Follow-up and recommended labs and tests     Follow up with your primary " care provider as needed or as scheduled. I recommend returning to the emergency department if Keyon develops difficulty breathing or a fever. Otherwise resume his normal cares at home. I have ordered a suction machine for Keyon which will be delivered early next week.  - the DME order was sent to [facility] by fax today.  They will submit through insurance on Monday and should be able to deliver it to you on Tuesday or Wednesday.  Their number is [phone] should you need to call them.     Activity    Your activity upon discharge: Resume normal activity, patient is bed bound     Suction Machine Order    Suction Documentation  I attest that I have seen and evaluated Keyon Farias. He has a chronic diagnosis of G90.3 - chronic dysphagia requiring use of a suction machine. Face to face addressing dysphagia and inability to clear/mobilize secretions.     I, the undersigned, certify that the above prescribed supplies are medically necessary for this patient and is both reasonable and necessary in reference to accepted standards of medical and necessary in reference to accepted standards of medical practice in the treatment of this patient's condition and is not prescribed as a convenience.     Diet    Follow this diet upon discharge: Orders Placed This Encounter      Adult Formula Drip Feeding: Continuous Jevity 1.5; Jejunostomy; Goal Rate: 50; mL/hr; OK to turn TF up to 35 ml/hr now, advance to goal after 12 hours.      NPO for Medical/Clinical Reasons Except for: No Exceptions     Consultations This Hospital Stay   NUTRITION SERVICES ADULT IP CONSULT  PHARMACY TO DOSE VANCO  PHARMACY IP CONSULT  CARE MANAGEMENT / SOCIAL WORK IP CONSULT     LABORATORY IMAGING AND PROCEDURES:   Laboratory studies that included CBC with platelets differential, CMP, BMP, CBC with platelets     10/25/23 chest XR  Stable size of cardiomediastinal silhouette. No  significant change in right basilar opacity in comparison to prior CT  and  "radiograph, suggestive of bronchitis and/or pneumonia. No new  airspace consolidation, pleural effusion or pneumothorax. Bones are  unchanged.     PENDING RESULTS:    Unresulted Labs Ordered in the Past 30 Days of this Admission       Date and Time Order Name Status Description    10/25/2023 12:57 PM Blood Culture Peripheral Blood Preliminary     10/25/2023 12:57 PM Blood Culture Peripheral Blood Preliminary     9/26/2023  1:05 AM Acid-Fast Bacilli Culture and Stain In process           PHYSICAL EXAMINATION ON DAY OF DISCHARGE:      /70 (BP Location: Right arm)   Pulse 62   Temp 97.5  F (36.4  C) (Oral)   Resp 10   Ht 1.803 m (5' 11\")   Wt 70.8 kg (156 lb 1.4 oz)   SpO2 97%   BMI 21.77 kg/m      Constitutional: Non-verbal, NAD   ENT: Normocephalic, without obvious abnormality, atraumatic   Pulmonary: coarse BS bilateral lower lung fields. No increased work of breathing.No cough  Cardiovascular: Regular rate and rhythm, normal S1 and S2, no S3 or S4, and no murmur noted.  GI: Normal bowel sounds, soft, non-distended, non-tender.  Skin/Integumen: Visualized skin appeared clear.    Mark Hernández, DO      "

## 2023-10-30 ENCOUNTER — TELEPHONE (OUTPATIENT)
Dept: FAMILY MEDICINE | Facility: CLINIC | Age: 63
End: 2023-10-30
Payer: MEDICARE

## 2023-10-30 LAB
BACTERIA BLD CULT: NO GROWTH
BACTERIA BLD CULT: NO GROWTH

## 2023-10-30 NOTE — TELEPHONE ENCOUNTER
Ok for delay in start of care?       Home Care is calling regarding an established patient with [facility].        9/21/2023     8:14 AM   Home Care Information   Date of Home Care episode start 9/15/2023   Current following provider Elsa Queen   Date provider agreed to follow 9/15/2023    Name/Phone Number NANCI Anderson #[phone]   Home Care agency [facility]     Requesting orders from: Elsa Queen  Provider is following patient: Yes  Is this a 60-day recertification request?  No    Orders Requested    Skilled Nursing  Request for resumption in care.     Delay of start of junior visit to tomorrow.       Information was gathered and will be sent to provider for review.  RN will contact Home Care with information after provider review.  Confirmed ok to leave a detailed message with call back.  Contact information confirmed and updated as needed.    Raissa Recio RN

## 2023-10-31 ENCOUNTER — TELEPHONE (OUTPATIENT)
Dept: FAMILY MEDICINE | Facility: CLINIC | Age: 63
End: 2023-10-31

## 2023-10-31 ENCOUNTER — LAB (OUTPATIENT)
Dept: LAB | Facility: CLINIC | Age: 63
End: 2023-10-31
Payer: MEDICARE

## 2023-10-31 ENCOUNTER — PATIENT OUTREACH (OUTPATIENT)
Dept: CARE COORDINATION | Facility: CLINIC | Age: 63
End: 2023-10-31

## 2023-10-31 DIAGNOSIS — Z13.220 SCREENING FOR HYPERLIPIDEMIA: ICD-10-CM

## 2023-10-31 DIAGNOSIS — E03.9 HYPOTHYROIDISM, UNSPECIFIED TYPE: ICD-10-CM

## 2023-10-31 LAB
CHOLEST SERPL-MCNC: 152 MG/DL
HDLC SERPL-MCNC: 28 MG/DL
LDLC SERPL CALC-MCNC: 79 MG/DL
NONHDLC SERPL-MCNC: 124 MG/DL
TRIGL SERPL-MCNC: 225 MG/DL
TSH SERPL DL<=0.005 MIU/L-ACNC: <0.01 UIU/ML (ref 0.3–4.2)

## 2023-10-31 PROCEDURE — 80061 LIPID PANEL: CPT

## 2023-10-31 PROCEDURE — 84443 ASSAY THYROID STIM HORMONE: CPT

## 2023-10-31 PROCEDURE — 36415 COLL VENOUS BLD VENIPUNCTURE: CPT

## 2023-10-31 NOTE — PROGRESS NOTES
Clinic Care Coordination Contact  [facility]: Post-Discharge Note  SITUATION                                                      Admission:    Admission Date: 10/25/23   Reason for Admission: 1. Acute respiratory failure with hypoxia (H)  J96.01       2. Pneumonia due to infectious organism, unspecified laterality, unspecified part of lung  J18.9       3. COVID-19 virus infection  U07.1  Discharge:   Discharge Date: 10/28/23  Discharge Diagnosis: Acute hypoxic respiratory failure secondary to COVID-19 infection  Spastic hemiplegia  Traumatic brain injury  Nonverbal  Chronic right-sided paresis  Chronic dysphagia, s/p chronic GJ tube exchange on 9/21/23  Panhypopituitarism  Hypothyroidism    BACKGROUND                                                      Per hospital discharge summary and inpatient provider notes:    Keyon Farias is a 63 year old male with a history of a prior traumatic brain injury.  He is nonverbal and bedbound.  He has a PEG.  He was admitted recently with sepsis secondary to aspiration pneumonia.  He was here in the ED 4 days ago related to cough and tested positive for COVID-19.  D-dimer is elevated.  CT scan did not demonstrate PE.  Questionable opacity that was felt to be related to COVID.  He was not started on Paxlovid given interactions with his other medications.  He lives at home with his mother who felt that his increased work of breathing was concerning today.  He had very coarse breath sounds which is not usual for him.     The patient himself is not able to provide any further history.      ASSESSMENT           Discharge Assessment  How are you doing now that you are home?: Keyon is doing fine, doing quite well. Patient's mom stated the suction machine is supposed to come today.  How are your symptoms? (Red Flag symptoms escalate to triage hotline per guidelines): Improved  Do you feel your condition is stable enough to be safe at home until your provider visit?: Yes  Does the  patient have their discharge instructions? : Yes  Does the patient have questions regarding their discharge instructions? : No  Were you started on any new medications or were there changes to any of your previous medications? : Yes  Does the patient have all of their medications?: Yes  Do you have questions regarding any of your medications? : No  Do you have all of your needed medical supplies or equipment (DME)?  (i.e. oxygen tank, CPAP, cane, etc.): No - What equipment or supplies are needed? (New suction machine has been ordered, supposed to be delivered today.  Looking to get a new bed, the home care nurse has placed a call but patient's guardian was unsure if they were ordering one or not.  She will follow up with the home care RN.)  Discharge follow-up appointment scheduled within 14 calendar days? : No  Is patient agreeable to assistance with scheduling? : Yes         Post-op (Clinicians Only)  Did the patient have surgery or a procedure: No        PLAN                                                      Outpatient Plan:  Follow up with your primary care provider as needed or as scheduled. I recommend returning to the emergency department if Keyon develops difficulty breathing or a fever. Otherwise resume his normal cares at home. I have ordered a suction machine for Keyon which will be delivered early next week.    Future Appointments   Date Time Provider Department Center   10/31/2023  1:30 PM CS LAB [facility]    12/13/2023  1:00 PM Faizan Mclean MD [facility]         For any urgent concerns, please contact our 24 hour nurse triage line: [phone] ([phone])         Gracie Srinivasan RN

## 2023-10-31 NOTE — TELEPHONE ENCOUNTER
Home Care is calling regarding an established patient with  [facility].        9/21/2023     8:14 AM   Home Care Information   Date of Home Care episode start 9/15/2023   Current following provider Elsa Queen   Date provider agreed to follow 9/15/2023    Name/Phone Number NANCI Anderson #[phone]   Home Care agency [facility]     Requesting orders from: Elsa Queen  Provider is following patient: Yes  Is this a 60-day recertification request?  No    Orders Requested    Skilled Nursing  Request for resumption in care.     2xwk/2wks for nursing    Physical Therapy  Request for initial evaluation and treatment (one time)     Occupational Therapy  Request for initial evaluation and treatment (one time)       Information was gathered and will be sent to provider for review.  RN will contact Home Care with information after provider review.  Confirmed ok to leave a detailed message with call back.  Contact information confirmed and updated as needed.    Kamryn Murcia RN

## 2023-11-01 ENCOUNTER — TELEPHONE (OUTPATIENT)
Dept: FAMILY MEDICINE | Facility: CLINIC | Age: 63
End: 2023-11-01
Payer: MEDICARE

## 2023-11-01 DIAGNOSIS — E03.9 HYPOTHYROIDISM, UNSPECIFIED TYPE: Primary | ICD-10-CM

## 2023-11-01 RX ORDER — LEVOTHYROXINE SODIUM 125 UG/1
125 TABLET ORAL DAILY
Qty: 90 TABLET | Refills: 0 | Status: SHIPPED | OUTPATIENT
Start: 2023-11-01 | End: 2024-01-21

## 2023-11-01 NOTE — TELEPHONE ENCOUNTER
Verbal given for requested homecare orders.    Raissa Recio, RN  [facility] RN Triage Team

## 2023-11-01 NOTE — TELEPHONE ENCOUNTER
Informed of below, no further questions at this time.  Raissa Recio, RN  [facility] RN Triage Team

## 2023-11-03 ENCOUNTER — TELEPHONE (OUTPATIENT)
Dept: FAMILY MEDICINE | Facility: CLINIC | Age: 63
End: 2023-11-03
Payer: MEDICARE

## 2023-11-03 NOTE — TELEPHONE ENCOUNTER
[place], [facility], [phone]    Fax request:    OV notes from 5/10/2023    Fax to: [facility]  #[phone]

## 2023-11-06 ENCOUNTER — TELEPHONE (OUTPATIENT)
Dept: INTERVENTIONAL RADIOLOGY/VASCULAR | Facility: CLINIC | Age: 63
End: 2023-11-06
Payer: MEDICARE

## 2023-11-06 NOTE — TELEPHONE ENCOUNTER
Savannah Keyon's mother called in today to let us know that Keyon's GJ tube balloon popped. She has secured the tube best she could.     Rachel GAN NP entered an exchange order and they will come in 11/7

## 2023-11-07 ENCOUNTER — TELEPHONE (OUTPATIENT)
Dept: MEDSURG UNIT | Facility: CLINIC | Age: 63
End: 2023-11-07
Payer: MEDICARE

## 2023-11-07 ENCOUNTER — HOSPITAL ENCOUNTER (OUTPATIENT)
Facility: CLINIC | Age: 63
Discharge: HOME | End: 2023-11-07
Admitting: RADIOLOGY
Payer: MEDICARE

## 2023-11-07 ENCOUNTER — APPOINTMENT (OUTPATIENT)
Dept: INTERVENTIONAL RADIOLOGY/VASCULAR | Facility: CLINIC | Age: 63
End: 2023-11-07
Attending: NURSE PRACTITIONER
Payer: MEDICARE

## 2023-11-07 VITALS
OXYGEN SATURATION: 93 % | SYSTOLIC BLOOD PRESSURE: 97 MMHG | WEIGHT: 165 LBS | HEIGHT: 72 IN | RESPIRATION RATE: 18 BRPM | BODY MASS INDEX: 22.35 KG/M2 | DIASTOLIC BLOOD PRESSURE: 60 MMHG | TEMPERATURE: 97 F | HEART RATE: 80 BPM

## 2023-11-07 DIAGNOSIS — R13.10 DYSPHAGIA, UNSPECIFIED TYPE: ICD-10-CM

## 2023-11-07 PROCEDURE — 999N000163 HC STATISTIC SIMPLE TUBE INSERTION/CHARGE, PORT, CATH, FISTULOGRAM

## 2023-11-07 PROCEDURE — 49452 REPLACE G-J TUBE PERC: CPT

## 2023-11-07 PROCEDURE — 250N000009 HC RX 250: Performed by: RADIOLOGY

## 2023-11-07 PROCEDURE — C1769 GUIDE WIRE: HCPCS

## 2023-11-07 RX ORDER — LIDOCAINE HYDROCHLORIDE 20 MG/ML
JELLY TOPICAL ONCE
Status: COMPLETED | OUTPATIENT
Start: 2023-11-07 | End: 2023-11-07

## 2023-11-07 RX ADMIN — LIDOCAINE HYDROCHLORIDE 3 ML: 20 JELLY TOPICAL at 09:45

## 2023-11-07 ASSESSMENT — ACTIVITIES OF DAILY LIVING (ADL)
ADLS_ACUITY_SCORE: 37
ADLS_ACUITY_SCORE: 37

## 2023-11-07 NOTE — TELEPHONE ENCOUNTER
Chart reviewed for upcoming appt. Spoke with pt's legal guardian who states they are waiting in [facility] lobby to check in.     Zara Snell RN on 11/7/2023 at 8:48 AM

## 2023-11-07 NOTE — PROGRESS NOTES
Care Suites Discharge Nursing Note    Patient Information  Name: Keyon Farias  Age: 63 year old    Discharge Education:  Discharge instructions reviewed: Yes  Additional education/resources provided: Per IR  Patient/patient representative verbalizes understanding: Yes  Patient discharging on new medications: No  Medication education completed: Yes    Discharge Plans:   Discharge location: home  Discharge ride contacted: Yes  Approximate discharge time: 1010    Discharge Criteria:  Discharge criteria met and vital signs stable: Yes    Patient Belongs:  Patient belongings returned to patient: Yes    Jerald Ny RN

## 2023-11-07 NOTE — DISCHARGE INSTRUCTIONS
G-tube Exchange Discharge Instructions     After you go home:    You may resume your normal diet    Care of Insertion Site:    For the first 48 hrs, check your puncture site every couple hours while you are awake   You may remove/change the dressing tomorrow  You may shower tomorrow  No tub baths, whirlpools or swimming until your puncture site has fully healed     Activity     You may go back to normal activity in 24 hours  Wait 48 hours before lifting, straining, exercise or other strenuous activity    Medicines:    You may resume all medications  Resume your Warfarin/Coumadin at your regular dose today. Follow up with your provider to have your INR rechecked  Resume your Platelet Inhibitors and Aspirin tomorrow at your regular dose  For minor pain, you may take Acetaminophen (Tylenol) or Ibuprofen (Advil)                 Call the provider who ordered this procedure if:    Blood or fluid is draining from the site  The site is red, swollen, hot, tender or there is foul-smelling drainage  Chills or a fever greater than 101 F (38 C)  Increased pain at the site  Any questions or concerns    Call  911 or go to the Emergency Room if:    Severe pain or trouble breathing  Bleeding that you cannot control      If you have questions call:          [facility] Radiology Dept @ [phone]        The provider who performed your procedure was _________________.        Caring for your G-tube    Tube Maintenance:    For ENFit Tubes:    Do NOT over tighten the connections (the purple end & hub connection).    Clean the connecting ends (especially the hub) every day.    If you are unable to disconnect the tubing ends, soak the connection in warm water (or wrap in a wet warm washcloth) to try and loosen the connection.    Possible problems with your tube may include:    Clogged with medications or feedings - most obstructions can be cleared with a small (3cc) syringe and warm water. This may be repeated until the tube is  unclogged. This can be prevented by frequently flushing the tube with water (60cc) during the day and always after medications & feedings.    Tube pulls out or falls out -cover the opening with gauze & tape. Call [phone] for further instructions    Skin breakdown and/or yeast infections at the insertion site - use of skin barrier ointments and anti-fungal powders can treat most site irritations.  Ask your pharmacist or provider for assistance (a prescription is not necessary).    In general, tube problems (including pulled tubes) are NOT emergency situations. Unless the pulling out of a tube is accompanied by uncontrollable bleeding, please DO NOT GO TO THE EMERGENCY ROOM!  Call [phone] with problems.    Tube Care:    Change the gauze dressing every 24 hours and if soiled (dirty).  Stabilize all tubes securely by using gauze and tape.  Clean tube site with soap & water using a cotton applicator (Q-tip) as needed to prevent irritation.  Flush feeding tube with 60cc of warm or room temperature water before and after meds.  To prevent the tube from clogging, ask your provider or pharmacist if liquid forms of your medications are available. If not, crush the pills well & be sure to flush the tube before & after all medications.  Flush feeding tube a minimum of every 4 hours and when feeding is completed with 60cc of water to keep the tube clear and avoid clogging.  Pt may use an abdominal (waist) binder to protect the G-tube.    If there is continued oozing or bleeding, redness, yellow/green/foul smelling drainage    STOP the feedings & use of the tube immediately if there is:    Continued oozing or bleeding at the site  Redness  Yellow/green or foul smelling drainage at the site  Uncontrolled stomach pain    Many of the supplies mentioned above can be purchased at your local pharmacy      For issues with your tube, please call:    [place] Interventional Radiology Dept at [phone]

## 2023-11-07 NOTE — IR NOTE
Interventional Radiology Intra-procedural Nursing Note    Patient Name: Keyon Farias  Medical Record Number: [id number]  Today's Date: November 7, 2023    Procedure: GJ Tube Exchange  Start time: 0938  End time: 0943  Report provided to: Jerald CHRISTINE    Note: Patient entered Interventional Radiology Suite number 1 via cart. Patient awake, alert and orientated. Assisted onto procedural table in supine position. Prepped and draped.  Dr. Lara in room. Time out and procedure started. Vital signs stable.    Procedure well tolerated by patient without complications. Procedure end with debrief by Dr. Lara.      Administered medication totals:  Lidocaine 2% gel 3 mL

## 2023-11-07 NOTE — PROCEDURES
Interventional Radiology Post-Procedure Note     Patient name:  Keyon Farias  MRN:  [id number]   Date:  11/7/2023     Procedure(s): GJ-tube exchange    Attending:  Marco    Sedation:  None    Pre/Post Procedure Diagnosis: GJ-tube dependence    Drains/Lines:  18Fr 45cm length TORY GJ-tube    Specimen(s):  None    Estimated Blood Loss:  None    Complications:  None     Findings:  Uneventful procedure, please see dictation in PACS or under the Imaging tab in [facility] for detailed procedure note.    Plan:  GJ-tube ready for immediate use.  Routine exchange q4-6 months.      Yahir Lara MD  Vascular & Interventional Radiology  11/7/2023  9:43 AM

## 2023-11-07 NOTE — PROGRESS NOTES
Care Suites Post Procedure Note    Patient Information  Name: Keyon Farias  Age: 63 year old    Post Procedure  Time patient returned to Care Suites: 9612  Concerns/abnormal assessment: None at this time  If abnormal assessment, provider notified: N/A  Plan/Other: Monitor site, vitals.      Jerald Ny RN

## 2023-11-07 NOTE — PROGRESS NOTES
Care Suites Admission Nursing Note    Patient Information  Name: Keyon Farias  Age: 63 year old  Reason for admission: GJ tube exchange  Care Suites arrival time: 0850    Visitor Information  Name: Savannah sommer     Patient Admission/Assessment   Pre-procedure assessment complete: Yes  If abnormal assessment/labs, provider notified: N/A  NPO: Yes  Medications held per instructions/orders: N/A  Consent: obtained  Patient oriented to room: Yes  Education/questions answered: Yes  Plan/other: Proceed as planned per orders    Discharge Planning  Discharge name/phone number: Savannah [phone]  Discharge location: home    Zara Snell RN

## 2023-11-07 NOTE — PRE-PROCEDURE
GENERAL PRE-PROCEDURE:   Procedure:  Gastro jejunostomy tube exchange  Date/Time:  11/7/2023 9:00 AM    Written consent obtained?: Yes    Risks and benefits: Risks, benefits and alternatives were discussed    Consent given by:  Guardian  Patient states understanding of procedure being performed: Yes    Patient's understanding of procedure matches consent: Yes    Procedure consent matches procedure scheduled: Yes    Appropriately NPO:  Yes    Total time: 20 minutes    [facility] Interventional Radiology CNP ([phone]) (phone [phone])

## 2023-11-08 ENCOUNTER — VIRTUAL VISIT (OUTPATIENT)
Dept: FAMILY MEDICINE | Facility: CLINIC | Age: 63
End: 2023-11-08
Payer: MEDICARE

## 2023-11-08 DIAGNOSIS — E03.9 HYPOTHYROIDISM, UNSPECIFIED TYPE: ICD-10-CM

## 2023-11-08 DIAGNOSIS — R68.89 EXCESSIVE ORAL SECRETIONS: ICD-10-CM

## 2023-11-08 DIAGNOSIS — G81.01 FLACCID HEMIPLEGIA AFFECTING RIGHT DOMINANT SIDE, UNSPECIFIED ETIOLOGY (H): ICD-10-CM

## 2023-11-08 DIAGNOSIS — Z12.11 SCREEN FOR COLON CANCER: Primary | ICD-10-CM

## 2023-11-08 PROCEDURE — 99214 OFFICE O/P EST MOD 30 MIN: CPT | Mod: VID | Performed by: INTERNAL MEDICINE

## 2023-11-08 NOTE — PROGRESS NOTES
Keyon is a 63 year old who is being evaluated via a billable video visit.      How would you like to obtain your AVS? Mail a copy  If the video visit is dropped, the invitation should be resent by: Text to cell phone: [phone]  Will anyone else be joining your video visit? No      Assessment & Plan     Screen for colon cancer  - Fecal colorectal cancer screen FIT - Future (S+30); Future    Excessive oral secretions  Suction machine for oral secretions   - Suction Machine Order for DME - ONLY FOR DME    Hypothyroidism, unspecified type  Continue 125 mcg   TSH in 2 months     Patient has flaccid hemiplegia affecting right side and needs a new hospital bed.   DME order signed.        MED REC REQUIRED  Post Medication Reconciliation Status:  Discharge medications reconciled, continue medications without change  See Patient Instructions    Elsa Queen MD  [facility]    Minerva Ta is a 63 year old, presenting for the following health issues:  Hospital F/U    HPI     He was recently in the hospital for shortness of breath, did not have pneumonia and was given abx for 3 days while tests were run.   They suctioned his trachea and was probably the cause of his SOB.   He is due for colon cancer screening test due         10/31/2023     9:32 AM   Post Discharge Outreach   Admission Date 10/25/2023   Reason for Admission 1. Acute respiratory failure with hypoxia (H)  J96.01       2. Pneumonia due to infectious organism, unspecified laterality, unspecified part of lung  J18.9       3. COVID-19 virus infection  U07.1   Discharge Date 10/28/2023   Discharge Diagnosis Acute hypoxic respiratory failure secondary to COVID-19 infection  Spastic hemiplegia  Traumatic brain injury  Nonverbal  Chronic right-sided paresis  Chronic dysphagia, s/p chronic GJ tube exchange on 9/21/23  Panhypopituitarism  Hypothyroidism   How are you doing now that you are home? Keyon is doing fine, doing quite well. Patient's  mom stated the suction machine is supposed to come today.   How are your symptoms? (Red Flag symptoms escalate to triage hotline per guidelines) Improved   Do you feel your condition is stable enough to be safe at home until your provider visit? Yes   Does the patient have their discharge instructions?  Yes   Does the patient have questions regarding their discharge instructions?  No   Were you started on any new medications or were there changes to any of your previous medications?  Yes   Does the patient have all of their medications? Yes   Do you have questions regarding any of your medications?  No   Do you have all of your needed medical supplies or equipment (DME)?  (i.e. oxygen tank, CPAP, cane, etc.) No - What equipment or supplies are needed?   Discharge follow-up appointment scheduled within 14 calendar days?  No     Hospital Follow-up Visit:    Hospital/Nursing Home/IP Rehab Facility: [facility]  Date of Admission: 10/25/23  Date of Discharge: 10/28/23  Reason(s) for Admission: Acute hypoxic respiratory failure secondary to COVID-19 infection  Spastic hemiplegia  Traumatic brain injury  Nonverbal  Chronic right-sided paresis  Chronic dysphagia, s/p chronic GJ tube exchange on 9/21/23  Panhypopituitarism  Hypothyroidism    Was your hospitalization related to COVID-19? YES   How are you feeling today? Better  In the past 24 hours have you had shortness of breath when speaking, walking, or climbing stairs? I don't have breathing problems  Do you have a cough? I don't have a cough  When is the last time you had a fever greater than 100? 10/25/23  Are you having any other symptoms? None   Do you have any other stressors you would like to discuss with your provider? OTHER: suction machine     Was the patient in the ICU or did the patient experience delirium during hospitalization?  No  Problems taking medications regularly:  None  Medication changes since discharge: None  Problems  adhering to non-medication therapy:  n/a    Summary of hospitalization:  [facility] discharge summary reviewed  Diagnostic Tests/Treatments reviewed.  Follow up needed: none  Other Healthcare Providers Involved in Patient s Care:         None  Update since discharge: improved.        Plan of care communicated with patient       Review of Systems       Objective    Vitals - Patient Reported  Systolic (Patient Reported): 102  Diastolic (Patient Reported): 69  Pulse (Patient Reported): 89  Pain Score: No Pain (0)  Vitals:  No vitals were obtained today due to virtual visit.    Physical Exam   GEN: No acute distress  RESP: No audible increased work of breathing. Patient speaking in full sentences without distress.  PSYCH: pleasant  Exam otherwise limited due to virtual platform        Video-Visit Details    Type of service:  Video Visit   Video Start Time: 4:24 PM  Video End Time:4:32 PM  Originating Location (pt. Location): Home  Distant Location (provider location):  On-site  Platform used for Video Visit: Sheyla

## 2023-11-09 ENCOUNTER — TELEPHONE (OUTPATIENT)
Dept: FAMILY MEDICINE | Facility: CLINIC | Age: 63
End: 2023-11-09
Payer: MEDICARE

## 2023-11-09 NOTE — TELEPHONE ENCOUNTER
Home Care is calling regarding an established patient with  [employer] [facility].        9/21/2023     8:14 AM   Home Care Information   Date of Home Care episode start 9/15/2023   Current following provider Elsa Queen   Date provider agreed to follow 9/15/2023    Name/Phone Number NANCI Anderson #[phone]   Home Care agency [facility]     Requesting orders from: Elsa Queen  Provider is following patient: Yes  Is this a 60-day recertification request?  Yes    Orders Requested    Skilled Nursing  Request for recertification   Frequency:  1x/wk for 1 wks  Every 2 weeks for 8 weeks, 3 PRN visits  Reason: Disease Management    Information was gathered and will be sent to provider for review.  RN will contact Home Care with information after provider review.  Confirmed ok to leave a detailed message with call back.  Contact information confirmed and updated as needed.    Risa Epstein RN

## 2023-11-09 NOTE — TELEPHONE ENCOUNTER
Home Care is calling regarding an established patient with  [facility].        9/21/2023     8:14 AM   Home Care Information   Date of Home Care episode start 9/15/2023   Current following provider Elsa Queen   Date provider agreed to follow 9/15/2023    Name/Phone Number NANCI Anderson #[phone]   Home Care agency [facility]     Requesting orders from: Elsa Queen  Provider is following patient: Yes  Is this a 60-day recertification request?  Yes    Orders Requested    Physical Therapy  Request for recertification   Frequency:  1x/wk for 3 wks  2x/month for 1 months        Confirmed ok to leave a detailed message with call back.  Contact information confirmed and updated as needed.    Mayela Shabazz RN

## 2023-11-09 NOTE — PLAN OF CARE
Problem: Patient Care Overview  Goal: Plan of Care/Patient Progress Review  Outcome: No Change  Patient is alert, but nonverbal, able to nod yes or no to some questions. VSS on RA exception to bradycardic at times. Tele SB.  NPO, TF to be started at 0700 this morning. 200ml flush every 3 hours. PIV in left foot is patent, continues on IV antibiotics. Blanchable redness to coccyx, turning & repositioning.Scopalamine patch in place behind right ear. Contact precautions maintained. Discharge pending pt progress. Will continue to observe.       The patient is a 6y5m Female complaining of ear pain.

## 2023-11-10 NOTE — TELEPHONE ENCOUNTER
Called and left message for Monica on confidential VM. Left clinic number to call back if questions/concerns.    Kamryn Murcia RN on 11/10/2023 at 3:26 PM

## 2023-11-10 NOTE — TELEPHONE ENCOUNTER
Called and left message on BRANDAN Mclean confidential VM regarding PCP message below per ok to leave message. Left clinic number to call back with questions/concerns.     Kamryn Murcia RN on 11/10/2023 at 2:44 PM

## 2023-11-13 ENCOUNTER — TELEPHONE (OUTPATIENT)
Dept: FAMILY MEDICINE | Facility: CLINIC | Age: 63
End: 2023-11-13
Payer: MEDICARE

## 2023-11-13 DIAGNOSIS — G81.01 FLACCID HEMIPLEGIA AFFECTING RIGHT DOMINANT SIDE, UNSPECIFIED ETIOLOGY (H): Primary | ICD-10-CM

## 2023-11-13 NOTE — TELEPHONE ENCOUNTER
Orders relayed to [facility] via secure DVM.    NANCI Sexton  [facility]

## 2023-11-13 NOTE — TELEPHONE ENCOUNTER
One unsigned DM. Making sure this has been addressed as well.     NANCI Sexton  [facility]

## 2023-11-13 NOTE — TELEPHONE ENCOUNTER
Home Care is calling regarding an established patient with [facility].  {       9/21/2023     8:14 AM   Home Care Information   Date of Home Care episode start 9/15/2023   Current following provider Elsa Queen   Date provider agreed to follow 9/15/2023    Name/Phone Number NANCI Anderson #[phone]   Home Care agency [facility]     Requesting orders from: Elsa Queen  Provider is following patient: Yes  Is this a 60-day recertification request?  No    Orders Requested    Occupational Therapy  Request for continuation of care with no increase or decrease in frequency  Frequency: one additional visit.          Verbal orders given.  Home Care will send orders for provider to sign.  Verbal orders given, provider out of office, covering provider used.  Home Care will send orders for covering provider to sign.    Fer Beckford RN      Pt needs a DME order for a hospital bed. Pt has a hospital bed but is over 6 years old and is not working. Pt had a VV 11/08/2023. Would provider be able to addend appt notes and order DME order for hospital bed?     Hospital bed DME order needs to be faxed to [facility] [phone].

## 2023-11-17 DIAGNOSIS — E03.9 HYPOTHYROIDISM, UNSPECIFIED TYPE: ICD-10-CM

## 2023-11-17 RX ORDER — LEVOTHYROXINE SODIUM 125 UG/1
125 TABLET ORAL DAILY
Qty: 90 TABLET | Refills: 0 | OUTPATIENT
Start: 2023-11-17

## 2023-11-17 NOTE — TELEPHONE ENCOUNTER
Script sent to the pharmacy on 11/1/23 for 90 tablets. Patient has requested refill too soon.     Risa Epstein RN

## 2023-11-20 ENCOUNTER — DOCUMENTATION ONLY (OUTPATIENT)
Facility: CLINIC | Age: 63
End: 2023-11-20
Payer: MEDICARE

## 2023-11-20 NOTE — PROGRESS NOTES
PT WAS GIVEN INSTRUCTION AND EDUCATION ON THE CARE AND MAINTENANCE OF THE SUCTION UNIT AND ASSOCIATED SUPPLIES IN ACCORDANCE WITH THE [facility] PRACTICE GUIDELINES.  THE SUCTION MACHINE RECEIVED A QUALITY CHECK FOR PROPER CLEANING AND FUNCTION OF THE UNIT PRIOR TO SET UP ON THE PATIENT.    RX: SUCTION MACHINE & SUPPLIES  ORAL AND/OR TRACH? ORAL  RX SIGNED BY: JACOB BERGERON  DATE SIGNED: 11/8/2023  RORO: 99  DIAGNOSIS: DYSPHAGIA    DATE PATIENT WAS SETUP ON: 11/14/2023  LOCATION OF SETUP: PATIENT'S HOME  SETUP BY: SHREYA SAWANT  FOLLOW UP NEEDED? TBD    SHREYA SAWANT-RRT  [phone]

## 2023-11-21 LAB
ACID FAST STAIN (ARUP): NORMAL

## 2023-11-26 ENCOUNTER — APPOINTMENT (OUTPATIENT)
Dept: GENERAL RADIOLOGY | Facility: CLINIC | Age: 63
DRG: 871 | End: 2023-11-26
Attending: EMERGENCY MEDICINE
Payer: MEDICARE

## 2023-11-26 ENCOUNTER — HOSPITAL ENCOUNTER (EMERGENCY)
Facility: CLINIC | Age: 63
Discharge: HOME | DRG: 871 | End: 2023-11-26
Attending: EMERGENCY MEDICINE | Admitting: EMERGENCY MEDICINE
Payer: MEDICARE

## 2023-11-26 ENCOUNTER — NURSE TRIAGE (OUTPATIENT)
Dept: NURSING | Facility: CLINIC | Age: 63
End: 2023-11-26
Payer: MEDICARE

## 2023-11-26 VITALS
RESPIRATION RATE: 20 BRPM | HEART RATE: 76 BPM | TEMPERATURE: 97.5 F | DIASTOLIC BLOOD PRESSURE: 68 MMHG | OXYGEN SATURATION: 98 % | SYSTOLIC BLOOD PRESSURE: 116 MMHG | WEIGHT: 165 LBS | BODY MASS INDEX: 22.38 KG/M2

## 2023-11-26 DIAGNOSIS — Z87.01 HISTORY OF ASPIRATION PNEUMONIA: ICD-10-CM

## 2023-11-26 DIAGNOSIS — R50.9 TEMPERATURE ELEVATION: ICD-10-CM

## 2023-11-26 LAB
ALBUMIN SERPL BCG-MCNC: 3.7 G/DL (ref 3.5–5.2)
ALBUMIN UR-MCNC: 50 MG/DL
ALP SERPL-CCNC: 107 U/L (ref 40–150)
ALT SERPL W P-5'-P-CCNC: 25 U/L (ref 0–70)
AMORPH CRY #/AREA URNS HPF: ABNORMAL /HPF
ANION GAP SERPL CALCULATED.3IONS-SCNC: 12 MMOL/L (ref 7–15)
APPEARANCE UR: CLEAR
AST SERPL W P-5'-P-CCNC: 32 U/L (ref 0–45)
BASOPHILS # BLD AUTO: 0.1 10E3/UL (ref 0–0.2)
BASOPHILS NFR BLD AUTO: 1 %
BILIRUB SERPL-MCNC: 0.2 MG/DL
BILIRUB UR QL STRIP: NEGATIVE
BUN SERPL-MCNC: 21.8 MG/DL (ref 8–23)
CALCIUM SERPL-MCNC: 8.5 MG/DL (ref 8.8–10.2)
CHLORIDE SERPL-SCNC: 98 MMOL/L (ref 98–107)
COLOR UR AUTO: YELLOW
CREAT SERPL-MCNC: 0.92 MG/DL (ref 0.67–1.17)
DEPRECATED HCO3 PLAS-SCNC: 29 MMOL/L (ref 22–29)
EGFRCR SERPLBLD CKD-EPI 2021: >90 ML/MIN/1.73M2
EOSINOPHIL # BLD AUTO: 0.9 10E3/UL (ref 0–0.7)
EOSINOPHIL NFR BLD AUTO: 9 %
ERYTHROCYTE [DISTWIDTH] IN BLOOD BY AUTOMATED COUNT: 14.3 % (ref 10–15)
FLUAV RNA SPEC QL NAA+PROBE: NEGATIVE
FLUBV RNA RESP QL NAA+PROBE: NEGATIVE
GLUCOSE SERPL-MCNC: 113 MG/DL (ref 70–99)
GLUCOSE UR STRIP-MCNC: NEGATIVE MG/DL
HCO3 BLDV-SCNC: 32 MMOL/L (ref 21–28)
HCT VFR BLD AUTO: 40 % (ref 40–53)
HGB BLD-MCNC: 13.1 G/DL (ref 13.3–17.7)
HGB UR QL STRIP: NEGATIVE
IMM GRANULOCYTES # BLD: 0 10E3/UL
IMM GRANULOCYTES NFR BLD: 0 %
KETONES UR STRIP-MCNC: NEGATIVE MG/DL
LACTATE BLD-SCNC: 1.9 MMOL/L
LEUKOCYTE ESTERASE UR QL STRIP: NEGATIVE
LYMPHOCYTES # BLD AUTO: 2.7 10E3/UL (ref 0.8–5.3)
LYMPHOCYTES NFR BLD AUTO: 28 %
MCH RBC QN AUTO: 29.7 PG (ref 26.5–33)
MCHC RBC AUTO-ENTMCNC: 32.8 G/DL (ref 31.5–36.5)
MCV RBC AUTO: 91 FL (ref 78–100)
MONOCYTES # BLD AUTO: 1 10E3/UL (ref 0–1.3)
MONOCYTES NFR BLD AUTO: 10 %
MUCOUS THREADS #/AREA URNS LPF: PRESENT /LPF
NEUTROPHILS # BLD AUTO: 4.9 10E3/UL (ref 1.6–8.3)
NEUTROPHILS NFR BLD AUTO: 52 %
NITRATE UR QL: NEGATIVE
NRBC # BLD AUTO: 0 10E3/UL
NRBC BLD AUTO-RTO: 0 /100
PCO2 BLDV: 42 MM HG (ref 40–50)
PH BLDV: 7.48 [PH] (ref 7.32–7.43)
PH UR STRIP: 8.5 [PH] (ref 5–7)
PLATELET # BLD AUTO: 222 10E3/UL (ref 150–450)
PO2 BLDV: 123 MM HG (ref 25–47)
POTASSIUM SERPL-SCNC: 3.9 MMOL/L (ref 3.4–5.3)
PROT SERPL-MCNC: 7.2 G/DL (ref 6.4–8.3)
RBC # BLD AUTO: 4.41 10E6/UL (ref 4.4–5.9)
RBC URINE: 4 /HPF
RSV RNA SPEC NAA+PROBE: NEGATIVE
SAO2 % BLDV: 99 % (ref 94–100)
SARS-COV-2 RNA RESP QL NAA+PROBE: POSITIVE
SODIUM SERPL-SCNC: 139 MMOL/L (ref 135–145)
SP GR UR STRIP: 1.03 (ref 1–1.03)
SQUAMOUS EPITHELIAL: 1 /HPF
UROBILINOGEN UR STRIP-MCNC: 2 MG/DL
WBC # BLD AUTO: 9.6 10E3/UL (ref 4–11)
WBC URINE: 1 /HPF

## 2023-11-26 PROCEDURE — 71045 X-RAY EXAM CHEST 1 VIEW: CPT

## 2023-11-26 PROCEDURE — 81001 URINALYSIS AUTO W/SCOPE: CPT | Performed by: EMERGENCY MEDICINE

## 2023-11-26 PROCEDURE — 82803 BLOOD GASES ANY COMBINATION: CPT

## 2023-11-26 PROCEDURE — 85025 COMPLETE CBC W/AUTO DIFF WBC: CPT | Performed by: EMERGENCY MEDICINE

## 2023-11-26 PROCEDURE — 87040 BLOOD CULTURE FOR BACTERIA: CPT | Performed by: EMERGENCY MEDICINE

## 2023-11-26 PROCEDURE — 87637 SARSCOV2&INF A&B&RSV AMP PRB: CPT | Performed by: EMERGENCY MEDICINE

## 2023-11-26 PROCEDURE — 96360 HYDRATION IV INFUSION INIT: CPT

## 2023-11-26 PROCEDURE — 258N000003 HC RX IP 258 OP 636: Performed by: EMERGENCY MEDICINE

## 2023-11-26 PROCEDURE — 99284 EMERGENCY DEPT VISIT MOD MDM: CPT | Mod: 25

## 2023-11-26 PROCEDURE — 80053 COMPREHEN METABOLIC PANEL: CPT | Performed by: EMERGENCY MEDICINE

## 2023-11-26 PROCEDURE — 36415 COLL VENOUS BLD VENIPUNCTURE: CPT | Performed by: EMERGENCY MEDICINE

## 2023-11-26 PROCEDURE — 83605 ASSAY OF LACTIC ACID: CPT

## 2023-11-26 RX ADMIN — SODIUM CHLORIDE, POTASSIUM CHLORIDE, SODIUM LACTATE AND CALCIUM CHLORIDE 1000 ML: 600; 310; 30; 20 INJECTION, SOLUTION INTRAVENOUS at 18:35

## 2023-11-26 ASSESSMENT — ACTIVITIES OF DAILY LIVING (ADL)
ADLS_ACUITY_SCORE: 37

## 2023-11-26 NOTE — ED NOTES
His care taker and guardian called [facility] because patient has been experiencing fever for a few days.  EMS recorded a temperature of 99.

## 2023-11-26 NOTE — TELEPHONE ENCOUNTER
Mom Savannah is listed as legal guardian. Pt is nonverbal and has right sided hemiplegia.     Caller reports:  3 days of low grade fever 99.8F (axilla)  /73  Heart rate 110   O2 sats 96%  Breathing good, no noisy breathing  Coughs once in a while  Appears tired    Pt has a rash on his arms where his wraps are as he scratches on it  Mom reports that pt can easily go into sepsis.     FNA advised ED. Mom verbalized understanding and will call an ambulance.    Kelly Alfonso RN/[place] Nurse Advisor      Reason for Disposition   Patient sounds very sick or weak to the triager  (Exception: Mild weakness and hasn't taken fever medicine.)    Additional Information   Negative: Shock suspected (e.g., cold/pale/clammy skin, too weak to stand, low BP, rapid pulse)   Negative: Difficult to awaken or acting confused (e.g., disoriented, slurred speech)   Negative: [1] Difficulty breathing AND [2] bluish lips, tongue or face   Negative: New-onset rash with multiple purple (or blood-colored) spots or dots   Negative: Sounds like a life-threatening emergency to the triager   Negative: Fever in a cancer patient who is currently (or recently) receiving chemotherapy or radiation therapy, or cancer patient who has metastatic or end-stage cancer and is receiving palliative care   Negative: Pregnant   Negative: Postpartum (from 0 to 6 weeks after delivery)   Negative: Fever onset within 24 hours of receiving vaccine   Negative: [1] Fever AND [2] within 14 days of COVID-19 Exposure   Negative: Other symptom is present, see that guideline (e.g., symptoms of cough, runny nose, sore throat, earache, abdominal pain, diarrhea, vomiting)   Negative: [1] Headache AND [2] stiff neck (can't touch chin to chest)   Negative: Difficulty breathing   Negative: IV Drug Use (IVDU)   Negative: [1] Drinking very little AND [2] dehydration suspected (e.g., no urine > 12 hours, very dry mouth, very lightheaded)   Negative: Widespread rash and cause  unknown    Protocols used: Fever-A-AH

## 2023-11-26 NOTE — ED PROVIDER NOTES
"  History     Chief Complaint:  Fever (EMS was called to his house because patient has been experiencing  fevers for a few days.  )       HPI   Keyon Farias is a 63 year old male with a complicated past medical history with a prior traumatic brain injury, nonverbal and bedbound with a PEG and recurrent aspiration pneumonia who presents with fever from home.  Of note, the patient was admitted on 10/25/2023 with COVID.  Onset date was 10/21/2023.  He is cleared from a COVID standpoint on 11/10/2023.  Patient is unable to provide any additional history.  Per EMS, mom called because patient's had a fever for the past few days.  EMS stated their temperature was 99.    Independent Historian:    Parent - They report the patient has had a slightly higher temperature than normal for the past few days.  No coughing or respiratory symptoms.  No vomiting.  No apparent pain.  Blood pressures have been normal.  Has had some elevated heart rates at times.    Review of External Notes:  I reviewed most recent discharge summary from 10/28/23.  Patient was COVID-positive as noted above.    Allergies:  Valproic Acid  Dilantin [Phenytoin Sodium]  Scopolamine     Medications:    acetaminophen (TYLENOL) 500 MG tablet  albuterol (PROVENTIL) (5 MG/ML) 0.5% neb solution  bacitracin 500 UNIT/GM external ointment  Brivaracetam (BRIVIACT) 10 MG/ML solution  carBAMazepine (TEGRETOL) 100 MG/5ML suspension  carBAMazepine (TEGRETOL) 100 MG/5ML suspension  COMPOUNDED NON-CONTROLLED SUBSTANCE (CMPD RX) - PHARMACY TO MIX COMPOUNDED MEDICATION  Cyanocobalamin (VITAMIN B-12 PO)  glycopyrrolate (ROBINUL) 1 MG tablet  guaiFENesin (MUCINEX) 600 MG 12 hr tablet  hydrocortisone (CORTAID) 1 % external cream  hydrocortisone (CORTEF) 5 MG tablet  insulin syringe-needle U-100 (29G X 1/2\" 1 ML) 29G X 1/2\" 1 ML miscellaneous  levothyroxine (SYNTHROID/LEVOTHROID) 125 MCG tablet  metoclopramide (REGLAN) 10 MG/10ML SOLN solution  miconazole (MICATIN) 2 % external " powder  multivitamin, therapeutic (THERA-VIT) TABS tablet  mupirocin (BACTROBAN) 2 % external ointment  pantoprazole (PROTONIX) 2 mg/mL SUSP suspension  sodium bicarbonate 650 MG tablet  testosterone cypionate (DEPOTESTOSTERONE) 200 MG/ML injection  vitamin C (ASCORBIC ACID) 1000 MG TABS  vitamin D3 (CHOLECALCIFEROL) 2000 units (50 mcg) tablet        Past Medical History:    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)     DVT of upper extremity (deep vein thrombosis) (H)     Gastro-oesophageal reflux disease     Panhypopituitarism (H24)     Pneumonia     Seizures (H)     Sepsis due to urinary tract infection (H) 01/15/2021    Septic shock (H)     Spastic hemiplegia affecting dominant side (H)     Thyroid disease     Tracheostomy care (H)     Traumatic brain injury (H) 1989    Unspecified cerebral artery occlusion with cerebral infarction 1989    UTI (urinary tract infection)     Ventricular fibrillation (H)     Ventricular tachyarrhythmia (H)        Past Surgical History:    Past Surgical History:   Procedure Laterality Date    ENDOSCOPIC ULTRASOUND UPPER GASTROINTESTINAL TRACT (GI) N/A 1/30/2017    Procedure: ENDOSCOPIC ULTRASOUND, ESOPHAGOSCOPY / UPPER GASTROINTESTINAL TRACT (GI);  Surgeon: Jus Montana MD;  Location: [facility] OR    ENDOSCOPIC ULTRASOUND, ESOPHAGOSCOPY, GASTROSCOPY, DUODENOSCOPY (EGD), NECROSECTOMY N/A 2/7/2017    Procedure: ENDOSCOPIC ULTRASOUND, ESOPHAGOSCOPY, GASTROSCOPY, DUODENOSCOPY (EGD), NECROSECTOMY;  Surgeon: Jack Marcus MD;  Location: [facility] OR    ESOPHAGOSCOPY, GASTROSCOPY, DUODENOSCOPY (EGD), COMBINED  3/13/2014    Procedure: COMBINED ESOPHAGOSCOPY, GASTROSCOPY, DUODENOSCOPY (EGD), BIOPSY SINGLE OR MULTIPLE;  gastroscopy;  Surgeon: Digna Rhodes MD;  Location:  GI    ESOPHAGOSCOPY, GASTROSCOPY, DUODENOSCOPY (EGD), COMBINED N/A 12/6/2016    Procedure: COMBINED ESOPHAGOSCOPY, GASTROSCOPY, DUODENOSCOPY (EGD);   Surgeon: Digna Rhodes MD;  Location:  GI    ESOPHAGOSCOPY, GASTROSCOPY, DUODENOSCOPY (EGD), COMBINED N/A 2/7/2017    Procedure: COMBINED ENDOSCOPIC ULTRASOUND, ESOPHAGOSCOPY, GASTROSCOPY, DUODENOSCOPY (EGD), FINE NEEDLE ASPIRATE/BIOPSY;  Surgeon: Too Thakur MD;  Location:  OR    HEAD & NECK SURGERY      reconstructive facial surgery following accident in 1989    IR FOLLOW UP VISIT INPATIENT  2/20/2019    IR GASTRO JEJUNOSTOMY TUBE CHANGE  12/20/2018    IR GASTRO JEJUNOSTOMY TUBE CHANGE  2/4/2019    IR GASTRO JEJUNOSTOMY TUBE CHANGE  3/8/2019    IR GASTRO JEJUNOSTOMY TUBE CHANGE  8/7/2019    IR GASTRO JEJUNOSTOMY TUBE CHANGE  1/13/2020    IR GASTRO JEJUNOSTOMY TUBE CHANGE  1/30/2020    IR GASTRO JEJUNOSTOMY TUBE CHANGE  6/24/2020    IR GASTRO JEJUNOSTOMY TUBE CHANGE  9/17/2020    IR GASTRO JEJUNOSTOMY TUBE CHANGE  10/14/2020    IR GASTRO JEJUNOSTOMY TUBE CHANGE  2/16/2021    IR GASTRO JEJUNOSTOMY TUBE CHANGE  5/6/2021    IR GASTRO JEJUNOSTOMY TUBE CHANGE  5/25/2021    IR GASTRO JEJUNOSTOMY TUBE CHANGE  7/26/2021    IR GASTRO JEJUNOSTOMY TUBE CHANGE  9/29/2021    IR GASTRO JEJUNOSTOMY TUBE CHANGE  11/16/2021    IR GASTRO JEJUNOSTOMY TUBE CHANGE  3/18/2022    IR GASTRO JEJUNOSTOMY TUBE CHANGE  6/8/2022    IR GASTRO JEJUNOSTOMY TUBE CHANGE  7/1/2022    IR GASTRO JEJUNOSTOMY TUBE CHANGE  11/25/2022    IR GASTRO JEJUNOSTOMY TUBE CHANGE  5/1/2023    IR GASTRO JEJUNOSTOMY TUBE CHANGE  8/10/2023    IR GASTRO JEJUNOSTOMY TUBE CHANGE  9/21/2023    IR GASTRO JEJUNOSTOMY TUBE CHANGE  11/7/2023    IR PICC EXCHANGE LEFT  8/15/2019    LAPAROSCOPIC APPENDECTOMY  7/30/2013    Procedure: LAPAROSCOPIC APPENDECTOMY;  LAPAROSCOPIC APPENDECTOMY;  Surgeon: Manish Pierce MD;  Location:  OR    LAPAROSCOPIC ASSISTED INSERTION TUBE GASTROTOMY N/A 9/7/2016    Procedure: LAPAROSCOPIC ASSISTED INSERTION TUBE GASTROSTOMY;  Surgeon: Manish Pierce MD;  Location:  OR    ORTHOPEDIC SURGERY      right hand  repair    TRACHEOSTOMY N/A 9/3/2016    Procedure: TRACHEOSTOMY;  Surgeon: João Ortiz MD;  Location:  OR    TRACHEOSTOMY N/A 12/2/2016    Procedure: TRACHEOSTOMY;  Surgeon: João Ortiz MD;  Location:  OR    VASCULAR SURGERY          Physical Exam   Patient Vitals for the past 24 hrs:   BP Temp Temp src Pulse Resp SpO2 Weight   11/26/23 2049 -- -- -- -- -- 100 % --   11/26/23 2019 -- -- -- -- -- 100 % --   11/26/23 1949 116/68 -- -- -- -- 100 % --   11/26/23 1947 116/68 -- -- 76 20 99 % --   11/26/23 1649 98/64 97.5  F (36.4  C) Temporal 88 18 95 % 74.8 kg (165 lb)        Physical Exam  General: Cachetic, chronically ill appearing  Eyes: PERRL, conjunctivae pink no scleral icterus or conjunctival injection  ENT:  Dry mucus membranes, posterior oropharynx clear without erythema or exudates  Respiratory:  Lungs clear to auscultation bilaterally, no crackles/rubs/wheezes.  Good air movement  CV: Normal rate and rhythm, no murmurs/rubs/gallops  GI:  Abdomen soft and non-distended.  Normoactive BS.  No tenderness, guarding or rebound  Skin: Warm, dry.  No rashes or petechiae  Musculoskeletal: No peripheral edema or calf tenderness  Neuro: Alert and rouses to voice  Psychiatric: Unable to assess      Emergency Department Course     Laboratory: Imaging:   Labs Ordered and Resulted from Time of ED Arrival to Time of ED Departure   COMPREHENSIVE METABOLIC PANEL - Abnormal       Result Value    Sodium 139      Potassium 3.9      Carbon Dioxide (CO2) 29      Anion Gap 12      Urea Nitrogen 21.8      Creatinine 0.92      GFR Estimate >90      Calcium 8.5 (*)     Chloride 98      Glucose 113 (*)     Alkaline Phosphatase 107      AST 32      ALT 25      Protein Total 7.2      Albumin 3.7      Bilirubin Total 0.2     CBC WITH PLATELETS AND DIFFERENTIAL - Abnormal    WBC Count 9.6      RBC Count 4.41      Hemoglobin 13.1 (*)     Hematocrit 40.0      MCV 91      MCH 29.7      MCHC 32.8      RDW 14.3       Platelet Count 222      % Neutrophils 52      % Lymphocytes 28      % Monocytes 10      % Eosinophils 9      % Basophils 1      % Immature Granulocytes 0      NRBCs per 100 WBC 0      Absolute Neutrophils 4.9      Absolute Lymphocytes 2.7      Absolute Monocytes 1.0      Absolute Eosinophils 0.9 (*)     Absolute Basophils 0.1      Absolute Immature Granulocytes 0.0      Absolute NRBCs 0.0     ROUTINE UA WITH MICROSCOPIC REFLEX TO CULTURE - Abnormal    Color Urine Yellow      Appearance Urine Clear      Glucose Urine Negative      Bilirubin Urine Negative      Ketones Urine Negative      Specific Gravity Urine 1.030      Blood Urine Negative      pH Urine 8.5 (*)     Protein Albumin Urine 50 (*)     Urobilinogen Urine 2.0      Nitrite Urine Negative      Leukocyte Esterase Urine Negative      Mucus Urine Present (*)     Amorphous Crystals Urine Few (*)     RBC Urine 4 (*)     WBC Urine 1      Squamous Epithelials Urine 1     INFLUENZA A/B, RSV, & SARS-COV2 PCR - Abnormal    Influenza A PCR Negative      Influenza B PCR Negative      RSV PCR Negative      SARS CoV2 PCR Positive (*)    ISTAT GASES LACTATE VENOUS POCT - Abnormal    Lactic Acid POCT 1.9      Bicarbonate Venous POCT 32 (*)     O2 Sat, Venous POCT 99      pCO2 Venous POCT 42      pH Venous POCT 7.48 (*)     pO2 Venous POCT 123 (*)    BLOOD CULTURE   BLOOD CULTURE     XR Chest Port 1 View   Final Result   IMPRESSION: Heart is normal in size. Lungs are clear.              Emergency Department Course & Assessments:       Interventions:  Medications   sodium chloride (PF) 0.9% PF flush 3 mL (has no administration in time range)   sodium chloride (PF) 0.9% PF flush 3 mL (has no administration in time range)   lactated ringers BOLUS 1,000 mL (0 mLs Intravenous Stopped 11/26/23 1947)        Assessments, Independent Interpretation, Consult/Discussion of ManagementTests:  ED Course as of 11/26/23 2120   [place] Nov 26, 2023   1823 I called and spoke with Keyon's mother  Savannah. She reports that for the past three days he has had a temp up to 99.9 at home. HR has been ~110. BP has been ok. She got a little a nervous because his temp seems to go back up. No congestion or coughing. No apparent pain. No vomiting. No other symptoms that mom is aware of.   1945 I updated patient's mom Savannah via telephone. She is comfortable with plan for discharge home. Will arrange for stretcher transport.       Social Determinants of Health affecting care:  Transportation    Disposition:  The patient was discharged to home.     Impression & Plan      Medical Decision Making:  Keyon Farias is a 63 year old male with a history of spastic hemiplegia and traumatic brain injury who is nonverbal and suffers from recurrent aspiration pneumonia who was sent to the emergency department for concern of a slightly elevated temperature from home.  Fortunately no other signs of infection.  He is breathing is normal.  Chest x-ray was clear.  Lactic is normal.  No signs of severe sepsis or septic shock.  Urinalysis is bland without signs of infection.  White count is reassuring.  Remainder of his laboratory studies are reassuring.  Testing for COVID, influenza and RSV was undertaken as it is a combination test.  COVID testing was positive but he is actually recovered from COVID and this is just prolonged positivity on the PCR test.  The influenza and RSV are negative.  There are no other concerning findings on his evaluation.  I discussed with mom and she is comfortable with the plan for discharge home.  He will be transported via stretcher back to home where his mom cares for him.    Diagnosis:    ICD-10-CM    1. Temperature elevation  R50.9       2. History of aspiration pneumonia  Z87.01            Discharge Medications:  New Prescriptions    No medications on file          11/26/2023   Germaine Green MD Cho, Amy C, MD  11/26/23 2121

## 2023-11-27 ENCOUNTER — TELEPHONE (OUTPATIENT)
Dept: FAMILY MEDICINE | Facility: CLINIC | Age: 63
End: 2023-11-27
Payer: MEDICARE

## 2023-11-27 NOTE — TELEPHONE ENCOUNTER
DME Order faxed to [facility] at [phone].     **Please note that this fax# is only 1 digit different from the fax# used previously to fax the DME Order. The number starts [phone] instead of [phone] as previously noted below.

## 2023-11-27 NOTE — DISCHARGE INSTRUCTIONS
*You may resume diet and activities.  *No new medications. Continue current medications.  *Followup with your doctor in the next few days for a recheck.  *Return if you develop fever over 100.4, breathing difficulties, faint or feel like you will faint or become worse in any way.

## 2023-11-27 NOTE — TELEPHONE ENCOUNTER
Home Care is calling regarding an established patient with  [facility].        9/21/2023     8:14 AM   Home Care Information   Date of Home Care episode start 9/15/2023   Current following provider Elsa Queen   Date provider agreed to follow 9/15/2023    Name/Phone Number NANCI Anderson #[phone]   Home Care agency [facility]     Requesting orders from: Elsa Queen  Provider is following patient: Yes  Is this a 60-day recertification request?  No    Orders Requested    Speech Therapy  Request for initial evaluation and treatment (one time)   Speech evaluation is for communication device.       Verbal orders given for Speech evaluation.  Information was gathered for FYI.  Provider review needed.  RN will contact Home Care with information after provider review.  FYI- patient's mother took him to the ED last night due to low grade fever and rattling in his chest. Patient was found to be OK and sent home.       Confirmed ok to leave a detailed message with call back.  Contact information confirmed and updated as needed.    Mayela Shabazz RN

## 2023-11-27 NOTE — TELEPHONE ENCOUNTER
Verbal orders given to Delphine MUNOZ. Agreeabklsamia to plan.     Carlie RN  [facility]

## 2023-11-27 NOTE — TELEPHONE ENCOUNTER
Delphine calling back to inquire if order for hospital bed was faxed.   Should have been faxed to [facility] at [phone].  Reviewing previous documentation DME order for hospital bed was faxed to the incorrect medical supply place.  Confirmed with Delphine [phone] is the correct number.  Mayela Shabazz RN

## 2023-11-28 ENCOUNTER — HOSPITAL ENCOUNTER (EMERGENCY)
Facility: CLINIC | Age: 63
Discharge: HOME | DRG: 871 | End: 2023-11-28
Attending: EMERGENCY MEDICINE | Admitting: EMERGENCY MEDICINE
Payer: MEDICARE

## 2023-11-28 ENCOUNTER — NURSE TRIAGE (OUTPATIENT)
Dept: FAMILY MEDICINE | Facility: CLINIC | Age: 63
End: 2023-11-28
Payer: MEDICARE

## 2023-11-28 ENCOUNTER — APPOINTMENT (OUTPATIENT)
Dept: GENERAL RADIOLOGY | Facility: CLINIC | Age: 63
DRG: 871 | End: 2023-11-28
Attending: EMERGENCY MEDICINE
Payer: MEDICARE

## 2023-11-28 VITALS
SYSTOLIC BLOOD PRESSURE: 93 MMHG | OXYGEN SATURATION: 96 % | TEMPERATURE: 97.8 F | RESPIRATION RATE: 18 BRPM | DIASTOLIC BLOOD PRESSURE: 69 MMHG | HEART RATE: 90 BPM

## 2023-11-28 DIAGNOSIS — J40 BRONCHITIS: ICD-10-CM

## 2023-11-28 LAB
ANION GAP SERPL CALCULATED.3IONS-SCNC: 13 MMOL/L (ref 7–15)
BASOPHILS # BLD AUTO: 0.2 10E3/UL (ref 0–0.2)
BASOPHILS NFR BLD AUTO: 1 %
BUN SERPL-MCNC: 20.5 MG/DL (ref 8–23)
CALCIUM SERPL-MCNC: 8.7 MG/DL (ref 8.8–10.2)
CHLORIDE SERPL-SCNC: 95 MMOL/L (ref 98–107)
CREAT SERPL-MCNC: 0.94 MG/DL (ref 0.67–1.17)
DEPRECATED HCO3 PLAS-SCNC: 28 MMOL/L (ref 22–29)
EGFRCR SERPLBLD CKD-EPI 2021: >90 ML/MIN/1.73M2
EOSINOPHIL # BLD AUTO: 1 10E3/UL (ref 0–0.7)
EOSINOPHIL NFR BLD AUTO: 7 %
ERYTHROCYTE [DISTWIDTH] IN BLOOD BY AUTOMATED COUNT: 14.6 % (ref 10–15)
FLUAV RNA SPEC QL NAA+PROBE: NEGATIVE
FLUBV RNA RESP QL NAA+PROBE: NEGATIVE
GLUCOSE SERPL-MCNC: 126 MG/DL (ref 70–99)
HCT VFR BLD AUTO: 45.5 % (ref 40–53)
HGB BLD-MCNC: 15 G/DL (ref 13.3–17.7)
HOLD SPECIMEN: NORMAL
IMM GRANULOCYTES # BLD: 0.1 10E3/UL
IMM GRANULOCYTES NFR BLD: 0 %
LACTATE SERPL-SCNC: 1.6 MMOL/L (ref 0.7–2)
LYMPHOCYTES # BLD AUTO: 3.7 10E3/UL (ref 0.8–5.3)
LYMPHOCYTES NFR BLD AUTO: 28 %
MCH RBC QN AUTO: 29.9 PG (ref 26.5–33)
MCHC RBC AUTO-ENTMCNC: 33 G/DL (ref 31.5–36.5)
MCV RBC AUTO: 91 FL (ref 78–100)
MONOCYTES # BLD AUTO: 1.6 10E3/UL (ref 0–1.3)
MONOCYTES NFR BLD AUTO: 12 %
NEUTROPHILS # BLD AUTO: 6.7 10E3/UL (ref 1.6–8.3)
NEUTROPHILS NFR BLD AUTO: 52 %
NRBC # BLD AUTO: 0 10E3/UL
NRBC BLD AUTO-RTO: 0 /100
PLATELET # BLD AUTO: 251 10E3/UL (ref 150–450)
POTASSIUM SERPL-SCNC: 3.8 MMOL/L (ref 3.4–5.3)
PROCALCITONIN SERPL IA-MCNC: 0.09 NG/ML
RBC # BLD AUTO: 5.02 10E6/UL (ref 4.4–5.9)
RSV RNA SPEC NAA+PROBE: NEGATIVE
SARS-COV-2 RNA RESP QL NAA+PROBE: POSITIVE
SODIUM SERPL-SCNC: 136 MMOL/L (ref 135–145)
WBC # BLD AUTO: 13.2 10E3/UL (ref 4–11)

## 2023-11-28 PROCEDURE — 96365 THER/PROPH/DIAG IV INF INIT: CPT

## 2023-11-28 PROCEDURE — 258N000003 HC RX IP 258 OP 636: Performed by: EMERGENCY MEDICINE

## 2023-11-28 PROCEDURE — 250N000011 HC RX IP 250 OP 636: Performed by: EMERGENCY MEDICINE

## 2023-11-28 PROCEDURE — 87637 SARSCOV2&INF A&B&RSV AMP PRB: CPT | Performed by: EMERGENCY MEDICINE

## 2023-11-28 PROCEDURE — 96361 HYDRATE IV INFUSION ADD-ON: CPT

## 2023-11-28 PROCEDURE — 99284 EMERGENCY DEPT VISIT MOD MDM: CPT | Mod: 25

## 2023-11-28 PROCEDURE — 71045 X-RAY EXAM CHEST 1 VIEW: CPT

## 2023-11-28 PROCEDURE — 85025 COMPLETE CBC W/AUTO DIFF WBC: CPT | Performed by: EMERGENCY MEDICINE

## 2023-11-28 PROCEDURE — 83605 ASSAY OF LACTIC ACID: CPT | Performed by: EMERGENCY MEDICINE

## 2023-11-28 PROCEDURE — 36415 COLL VENOUS BLD VENIPUNCTURE: CPT | Performed by: EMERGENCY MEDICINE

## 2023-11-28 PROCEDURE — 80048 BASIC METABOLIC PNL TOTAL CA: CPT | Performed by: EMERGENCY MEDICINE

## 2023-11-28 PROCEDURE — 84145 PROCALCITONIN (PCT): CPT | Performed by: EMERGENCY MEDICINE

## 2023-11-28 RX ORDER — CIPROFLOXACIN 2 MG/ML
400 INJECTION, SOLUTION INTRAVENOUS ONCE
Status: COMPLETED | OUTPATIENT
Start: 2023-11-28 | End: 2023-11-28

## 2023-11-28 RX ORDER — CIPROFLOXACIN 500 MG/5ML
500 KIT ORAL 2 TIMES DAILY
Qty: 60 ML | Refills: 0 | Status: SHIPPED | OUTPATIENT
Start: 2023-11-28 | End: 2023-11-29

## 2023-11-28 RX ADMIN — CIPROFLOXACIN 400 MG: 400 INJECTION, SOLUTION INTRAVENOUS at 18:02

## 2023-11-28 RX ADMIN — SODIUM CHLORIDE 1000 ML: 9 INJECTION, SOLUTION INTRAVENOUS at 15:48

## 2023-11-28 ASSESSMENT — ACTIVITIES OF DAILY LIVING (ADL)
ADLS_ACUITY_SCORE: 37
ADLS_ACUITY_SCORE: 37

## 2023-11-28 NOTE — ED TRIAGE NOTES
Pt presents to the ED via EMS for evaluation of fever. Per EMS report: at [facility] ED on Sunday dx with covid and aspiration PNA, today has fever of 100.8, given Tylenol PTA by staff at facility.      Triage Assessment (Adult)       Row Name 11/28/23 1523          Triage Assessment    Airway WDL WDL        Respiratory WDL    Rhythm/Pattern, Respiratory shallow   dx with aspiration PNA on Sunday        Cognitive/Neuro/Behavioral WDL    Cognitive/Neuro/Behavioral WDL --  Hx of TBI

## 2023-11-28 NOTE — ED NOTES
Bed: ED04  Expected date:   Expected time:   Means of arrival:   Comments:  Kerri - 64 M TBI fever lethargic eta 1518

## 2023-11-28 NOTE — TELEPHONE ENCOUNTER
"CC: Patient's mother Savannah (patient's legal guardian) calling on behalf of the patient.    Savannah states \"he is sleeping and won't wake up\"    Patient is breathing and pulse is present, Savannah states patient's pulse is 120. Temperature 100.8.    Patient has not been awake since 5am this morning and is very difficult to awaken. Typically is awake and up during the day. Patient is only somewhat responsive to being touched, spoken to.     Writer advised that Savannah call 911 now to have EMS come to home to evaluate. Savannah expressed verbal understanding and is agreeable will call 911 now. No further questions or concerns at this time.    Signing encounter.    Stephane Velasquez RN  [facility]    Reason for Disposition   Difficult to awaken or acting confused (e.g., disoriented, slurred speech)    Protocols used: Neurologic Deficit-A-OH    "

## 2023-11-28 NOTE — ED PROVIDER NOTES
History     Chief Complaint:  Fever       The history is limited by the condition of the patient (nonverbal).      Keyon Farias is a 63 year old male who presents via EMS for a fever of 100.8 today. He was given Tylenol by group home staff.  Patient recently had COVID and recovered from that.  He was recently seen in the ER for similar complaints and had blood cultures obtained that are negative to date and a negative work-up and sent home and now back again for similar complaints.  COVID was tested positive but was felt that he has recovered but is still not clearing.  I did speak with his mom over the phone and she notes that she had a hard time waking him up and checked his pulse rate and it was high and had a low-grade temperature at 100.9 so sent him in again for evaluation.    Independent Historian:   EMS report    Review of External Notes:          Medications:    Albuterol  Robinul   Cortef   Synthroid   Cholecalciferol   Reglan      Past Medical History:    DVT   Asthma   Hyperlipidemia   Pneumonia   Seizures   GERD  Thyroid disease  Panhypopituitarism  Ventricular tachyarrhythmia  Leukocytosis   Cardiac arrest   TBI   Pancreatitis  Vitamin D deficiency     Past Surgical History:    EGD x 4  Vascular surgery   Tracheostomy x2   Laparoscopic appendectomy   IR gastro jejunostomy tube change x21    Physical Exam   Patient Vitals for the past 24 hrs:   BP Temp Temp src Pulse Resp SpO2   11/28/23 1529 -- 97.8  F (36.6  C) Axillary -- 18 94 %   11/28/23 1528 105/71 -- -- 113 -- 95 %        Physical Exam  GENERAL: Nonverbal, mouth open, eyes open  HEAD: atraumatic  EYES: pupils reactive, extraocular muscles intact, conjunctivae normal  ENT:  Dry mucous membranes  NECK:  trachea midline, normal range of motion  RESPIRATORY: no tachypnea, Occasional pleural cough with course breath sounds on the right  CVS: normal S1/S2, no murmurs, intact distal pulses  ABDOMEN: soft, nontender, nondistention. GJ tube in  place  MUSCULOSKELETAL: no deformities  SKIN: warm and dry, no acute rashes or ulceration  NEURO: awake, eyes open, looks chronically debilitated, not following commands, but does look at me when I walk in the room  PSYCH:  Not agitated    Emergency Department Course     Imaging:  XR Chest Port 1 View   Final Result   IMPRESSION: Mild linear atelectasis in the lung bases, either due to   atelectasis or hernia. Normal heart size. No pleural effusion or   pneumothorax. Stable elevated right hemidiaphragm.      JULIÁN MURO MD            SYSTEM ID:  [id number]           Laboratory:  Labs Ordered and Resulted from Time of ED Arrival to Time of ED Departure   BASIC METABOLIC PANEL - Abnormal       Result Value    Sodium 136      Potassium 3.8      Chloride 95 (*)     Carbon Dioxide (CO2) 28      Anion Gap 13      Urea Nitrogen 20.5      Creatinine 0.94      GFR Estimate >90      Calcium 8.7 (*)     Glucose 126 (*)    INFLUENZA A/B, RSV, & SARS-COV2 PCR - Abnormal    Influenza A PCR Negative      Influenza B PCR Negative      RSV PCR Negative      SARS CoV2 PCR Positive (*)    CBC WITH PLATELETS AND DIFFERENTIAL - Abnormal    WBC Count 13.2 (*)     RBC Count 5.02      Hemoglobin 15.0      Hematocrit 45.5      MCV 91      MCH 29.9      MCHC 33.0      RDW 14.6      Platelet Count 251      % Neutrophils 52      % Lymphocytes 28      % Monocytes 12      % Eosinophils 7      % Basophils 1      % Immature Granulocytes 0      NRBCs per 100 WBC 0      Absolute Neutrophils 6.7      Absolute Lymphocytes 3.7      Absolute Monocytes 1.6 (*)     Absolute Eosinophils 1.0 (*)     Absolute Basophils 0.2      Absolute Immature Granulocytes 0.1      Absolute NRBCs 0.0     LACTIC ACID WHOLE BLOOD - Normal    Lactic Acid 1.6     PROCALCITONIN - Normal    Procalcitonin 0.09          Emergency Department Course & Assessments:    Interventions:  Medications   ciprofloxacin (CIPRO) infusion 400 mg (has no administration in time range)   sodium  chloride 0.9% BOLUS 1,000 mL (1,000 mLs Intravenous $New Bag 11/28/23 1542)        Assessments:  1540 I obtained history and examined the patient, as noted above.  1715 I spoke with the patient's mother over the phone, updating them on the patient's status.    Independent Interpretation (X-rays, CTs, rhythm strip):  None    Consultations/Discussion of Management or Tests:  None        Social Determinants of Health affecting care:   None    Disposition:  The patient was discharged to home.     Impression & Plan      Medical Decision Making:    Patient presents from home and is bedbound and TBI with feeding tube and total assistance and cares at home.  Does have a history of aspiration pneumonia.  X-ray is unrevealing.  He is not hypoxic.  Labs are really checked show slight elevated white count but no other significant findings to suggest need for admission.  Patient was given a dose of Cipro given prior usage going through his chart.  Talk to his mother.  Patient be sent back to his facility with plans for Cipro and continue care.      Diagnosis:    ICD-10-CM    1. Bronchitis  J40            Discharge Medications:  New Prescriptions    CIPROFLOXACIN (CIPRO) 500 MG/5ML (10%) SUSPENSION    Take 5 mLs (500 mg) by mouth 2 times daily for 6 days          Scribe Disclosure:  I, Luke Herring, am serving as a scribe at 3:40 PM on 11/28/2023 to document services personally performed by Randy Avila MD based on my observations and the provider's statements to me.        Randy Avila MD  11/28/23 7313

## 2023-11-29 ENCOUNTER — HOSPITAL ENCOUNTER (INPATIENT)
Facility: CLINIC | Age: 63
LOS: 9 days | Discharge: HOME HEALTH | DRG: 871 | End: 2023-12-08
Attending: STUDENT IN AN ORGANIZED HEALTH CARE EDUCATION/TRAINING PROGRAM | Admitting: HOSPITALIST
Payer: MEDICARE

## 2023-11-29 ENCOUNTER — TELEPHONE (OUTPATIENT)
Dept: FAMILY MEDICINE | Facility: CLINIC | Age: 63
End: 2023-11-29
Payer: MEDICARE

## 2023-11-29 ENCOUNTER — TELEPHONE (OUTPATIENT)
Dept: FAMILY MEDICINE | Facility: CLINIC | Age: 63
End: 2023-11-29

## 2023-11-29 ENCOUNTER — APPOINTMENT (OUTPATIENT)
Dept: GENERAL RADIOLOGY | Facility: CLINIC | Age: 63
DRG: 871 | End: 2023-11-29
Attending: STUDENT IN AN ORGANIZED HEALTH CARE EDUCATION/TRAINING PROGRAM
Payer: MEDICARE

## 2023-11-29 ENCOUNTER — NURSE TRIAGE (OUTPATIENT)
Dept: FAMILY MEDICINE | Facility: CLINIC | Age: 63
End: 2023-11-29

## 2023-11-29 DIAGNOSIS — A41.9 SEPSIS WITHOUT ACUTE ORGAN DYSFUNCTION, DUE TO UNSPECIFIED ORGANISM (H): Primary | ICD-10-CM

## 2023-11-29 DIAGNOSIS — J40 BRONCHITIS: Primary | ICD-10-CM

## 2023-11-29 DIAGNOSIS — J18.9 RECURRENT PNEUMONIA: ICD-10-CM

## 2023-11-29 DIAGNOSIS — J18.9 PNEUMONIA OF RIGHT LOWER LOBE DUE TO INFECTIOUS ORGANISM: ICD-10-CM

## 2023-11-29 PROBLEM — J98.11 ATELECTASIS: Status: ACTIVE | Noted: 2019-03-21

## 2023-11-29 PROBLEM — Z87.19 HISTORY OF PANCREATITIS: Status: ACTIVE | Noted: 2017-05-11

## 2023-11-29 LAB
ALBUMIN SERPL BCG-MCNC: 3.8 G/DL (ref 3.5–5.2)
ALBUMIN UR-MCNC: 50 MG/DL
ALP SERPL-CCNC: 107 U/L (ref 40–150)
ALT SERPL W P-5'-P-CCNC: 20 U/L (ref 0–70)
ANION GAP SERPL CALCULATED.3IONS-SCNC: 12 MMOL/L (ref 7–15)
APPEARANCE UR: CLEAR
AST SERPL W P-5'-P-CCNC: 33 U/L (ref 0–45)
BASOPHILS # BLD AUTO: 0.1 10E3/UL (ref 0–0.2)
BASOPHILS NFR BLD AUTO: 1 %
BILIRUB SERPL-MCNC: 0.4 MG/DL
BILIRUB UR QL STRIP: NEGATIVE
BUN SERPL-MCNC: 22.2 MG/DL (ref 8–23)
CALCIUM SERPL-MCNC: 8.5 MG/DL (ref 8.8–10.2)
CHLORIDE SERPL-SCNC: 93 MMOL/L (ref 98–107)
COLOR UR AUTO: YELLOW
CREAT SERPL-MCNC: 1.14 MG/DL (ref 0.67–1.17)
DEPRECATED HCO3 PLAS-SCNC: 30 MMOL/L (ref 22–29)
EGFRCR SERPLBLD CKD-EPI 2021: 73 ML/MIN/1.73M2
EOSINOPHIL # BLD AUTO: 0.9 10E3/UL (ref 0–0.7)
EOSINOPHIL NFR BLD AUTO: 6 %
ERYTHROCYTE [DISTWIDTH] IN BLOOD BY AUTOMATED COUNT: 14.7 % (ref 10–15)
GLUCOSE SERPL-MCNC: 112 MG/DL (ref 70–99)
GLUCOSE UR STRIP-MCNC: NEGATIVE MG/DL
HCO3 BLDV-SCNC: 33 MMOL/L (ref 21–28)
HCT VFR BLD AUTO: 44.3 % (ref 40–53)
HGB BLD-MCNC: 14.3 G/DL (ref 13.3–17.7)
HGB UR QL STRIP: NEGATIVE
HOLD SPECIMEN: NORMAL
IMM GRANULOCYTES # BLD: 0.1 10E3/UL
IMM GRANULOCYTES NFR BLD: 0 %
KETONES UR STRIP-MCNC: NEGATIVE MG/DL
LACTATE BLD-SCNC: 1.8 MMOL/L
LEUKOCYTE ESTERASE UR QL STRIP: NEGATIVE
LYMPHOCYTES # BLD AUTO: 3.5 10E3/UL (ref 0.8–5.3)
LYMPHOCYTES NFR BLD AUTO: 25 %
MCH RBC QN AUTO: 29.2 PG (ref 26.5–33)
MCHC RBC AUTO-ENTMCNC: 32.3 G/DL (ref 31.5–36.5)
MCV RBC AUTO: 91 FL (ref 78–100)
MONOCYTES # BLD AUTO: 1.4 10E3/UL (ref 0–1.3)
MONOCYTES NFR BLD AUTO: 10 %
MRSA DNA SPEC QL NAA+PROBE: POSITIVE
MUCOUS THREADS #/AREA URNS LPF: PRESENT /LPF
NEUTROPHILS # BLD AUTO: 8 10E3/UL (ref 1.6–8.3)
NEUTROPHILS NFR BLD AUTO: 58 %
NITRATE UR QL: NEGATIVE
NRBC # BLD AUTO: 0 10E3/UL
NRBC BLD AUTO-RTO: 0 /100
PCO2 BLDV: 56 MM HG (ref 40–50)
PH BLDV: 7.38 [PH] (ref 7.32–7.43)
PH UR STRIP: 8.5 [PH] (ref 5–7)
PLATELET # BLD AUTO: 218 10E3/UL (ref 150–450)
PO2 BLDV: 25 MM HG (ref 25–47)
POTASSIUM SERPL-SCNC: 3.4 MMOL/L (ref 3.4–5.3)
PROT SERPL-MCNC: 7.8 G/DL (ref 6.4–8.3)
RBC # BLD AUTO: 4.89 10E6/UL (ref 4.4–5.9)
RBC URINE: 1 /HPF
SA TARGET DNA: POSITIVE
SAO2 % BLDV: 43 % (ref 94–100)
SODIUM SERPL-SCNC: 135 MMOL/L (ref 135–145)
SP GR UR STRIP: 1.03 (ref 1–1.03)
SQUAMOUS EPITHELIAL: <1 /HPF
UROBILINOGEN UR STRIP-MCNC: 2 MG/DL
WBC # BLD AUTO: 13.9 10E3/UL (ref 4–11)
WBC URINE: 5 /HPF

## 2023-11-29 PROCEDURE — 71045 X-RAY EXAM CHEST 1 VIEW: CPT

## 2023-11-29 PROCEDURE — 250N000011 HC RX IP 250 OP 636: Mod: JZ | Performed by: STUDENT IN AN ORGANIZED HEALTH CARE EDUCATION/TRAINING PROGRAM

## 2023-11-29 PROCEDURE — 258N000003 HC RX IP 258 OP 636: Performed by: STUDENT IN AN ORGANIZED HEALTH CARE EDUCATION/TRAINING PROGRAM

## 2023-11-29 PROCEDURE — 83735 ASSAY OF MAGNESIUM: CPT | Performed by: HOSPITALIST

## 2023-11-29 PROCEDURE — 36415 COLL VENOUS BLD VENIPUNCTURE: CPT | Performed by: STUDENT IN AN ORGANIZED HEALTH CARE EDUCATION/TRAINING PROGRAM

## 2023-11-29 PROCEDURE — 99285 EMERGENCY DEPT VISIT HI MDM: CPT | Mod: 25

## 2023-11-29 PROCEDURE — 81001 URINALYSIS AUTO W/SCOPE: CPT | Performed by: STUDENT IN AN ORGANIZED HEALTH CARE EDUCATION/TRAINING PROGRAM

## 2023-11-29 PROCEDURE — 87149 DNA/RNA DIRECT PROBE: CPT | Performed by: STUDENT IN AN ORGANIZED HEALTH CARE EDUCATION/TRAINING PROGRAM

## 2023-11-29 PROCEDURE — 87186 SC STD MICRODIL/AGAR DIL: CPT | Performed by: STUDENT IN AN ORGANIZED HEALTH CARE EDUCATION/TRAINING PROGRAM

## 2023-11-29 PROCEDURE — 82040 ASSAY OF SERUM ALBUMIN: CPT | Performed by: STUDENT IN AN ORGANIZED HEALTH CARE EDUCATION/TRAINING PROGRAM

## 2023-11-29 PROCEDURE — 85004 AUTOMATED DIFF WBC COUNT: CPT | Performed by: STUDENT IN AN ORGANIZED HEALTH CARE EDUCATION/TRAINING PROGRAM

## 2023-11-29 PROCEDURE — 87640 STAPH A DNA AMP PROBE: CPT | Performed by: STUDENT IN AN ORGANIZED HEALTH CARE EDUCATION/TRAINING PROGRAM

## 2023-11-29 PROCEDURE — 82803 BLOOD GASES ANY COMBINATION: CPT

## 2023-11-29 PROCEDURE — 99223 1ST HOSP IP/OBS HIGH 75: CPT | Mod: AI | Performed by: HOSPITALIST

## 2023-11-29 PROCEDURE — 120N000001 HC R&B MED SURG/OB

## 2023-11-29 PROCEDURE — 258N000003 HC RX IP 258 OP 636: Performed by: HOSPITALIST

## 2023-11-29 PROCEDURE — 82533 TOTAL CORTISOL: CPT | Performed by: HOSPITALIST

## 2023-11-29 PROCEDURE — 84100 ASSAY OF PHOSPHORUS: CPT | Performed by: HOSPITALIST

## 2023-11-29 PROCEDURE — 96360 HYDRATION IV INFUSION INIT: CPT

## 2023-11-29 RX ORDER — CIPROFLOXACIN 500 MG/5ML
500 KIT ORAL 2 TIMES DAILY
Qty: 60 ML | Refills: 0 | Status: SHIPPED | OUTPATIENT
Start: 2023-11-29 | End: 2023-11-29

## 2023-11-29 RX ORDER — LEVOFLOXACIN 5 MG/ML
750 INJECTION, SOLUTION INTRAVENOUS ONCE
Status: COMPLETED | OUTPATIENT
Start: 2023-11-29 | End: 2023-11-29

## 2023-11-29 RX ORDER — PIPERACILLIN SODIUM, TAZOBACTAM SODIUM 3; .375 G/15ML; G/15ML
3.38 INJECTION, POWDER, LYOPHILIZED, FOR SOLUTION INTRAVENOUS EVERY 6 HOURS
Status: DISCONTINUED | OUTPATIENT
Start: 2023-11-29 | End: 2023-11-30

## 2023-11-29 RX ORDER — METOCLOPRAMIDE HYDROCHLORIDE 5 MG/5ML
10 SOLUTION ORAL
Status: DISCONTINUED | OUTPATIENT
Start: 2023-11-30 | End: 2023-11-30

## 2023-11-29 RX ORDER — ACETAMINOPHEN 650 MG/1
650 SUPPOSITORY RECTAL EVERY 4 HOURS PRN
Status: DISCONTINUED | OUTPATIENT
Start: 2023-11-29 | End: 2023-12-08 | Stop reason: HOSPADM

## 2023-11-29 RX ORDER — CARBAMAZEPINE 100 MG/5ML
150 SUSPENSION ORAL 3 TIMES DAILY
Status: DISCONTINUED | OUTPATIENT
Start: 2023-11-30 | End: 2023-12-08 | Stop reason: HOSPADM

## 2023-11-29 RX ORDER — ONDANSETRON 2 MG/ML
4 INJECTION INTRAMUSCULAR; INTRAVENOUS EVERY 6 HOURS PRN
Status: DISCONTINUED | OUTPATIENT
Start: 2023-11-29 | End: 2023-12-08 | Stop reason: HOSPADM

## 2023-11-29 RX ORDER — LIDOCAINE 40 MG/G
CREAM TOPICAL
Status: DISCONTINUED | OUTPATIENT
Start: 2023-11-29 | End: 2023-12-08 | Stop reason: HOSPADM

## 2023-11-29 RX ORDER — SODIUM CHLORIDE 9 MG/ML
INJECTION, SOLUTION INTRAVENOUS CONTINUOUS
Status: DISCONTINUED | OUTPATIENT
Start: 2023-11-29 | End: 2023-12-01

## 2023-11-29 RX ORDER — ACETAMINOPHEN 325 MG/1
650 TABLET ORAL EVERY 6 HOURS PRN
COMMUNITY

## 2023-11-29 RX ORDER — ONDANSETRON 4 MG/1
4 TABLET, ORALLY DISINTEGRATING ORAL EVERY 6 HOURS PRN
Status: DISCONTINUED | OUTPATIENT
Start: 2023-11-29 | End: 2023-12-08 | Stop reason: HOSPADM

## 2023-11-29 RX ORDER — AMOXICILLIN 250 MG
1 CAPSULE ORAL 2 TIMES DAILY PRN
Status: DISCONTINUED | OUTPATIENT
Start: 2023-11-29 | End: 2023-11-30

## 2023-11-29 RX ORDER — CARBAMAZEPINE 100 MG/5ML
100 SUSPENSION ORAL DAILY
Status: DISCONTINUED | OUTPATIENT
Start: 2023-11-30 | End: 2023-12-08 | Stop reason: HOSPADM

## 2023-11-29 RX ORDER — CIPROFLOXACIN 500 MG/1
500 TABLET, FILM COATED ORAL 2 TIMES DAILY
Qty: 12 TABLET | Refills: 0 | Status: SHIPPED | OUTPATIENT
Start: 2023-11-29 | End: 2023-11-29

## 2023-11-29 RX ORDER — AMOXICILLIN 250 MG
2 CAPSULE ORAL 2 TIMES DAILY PRN
Status: DISCONTINUED | OUTPATIENT
Start: 2023-11-29 | End: 2023-11-30

## 2023-11-29 RX ORDER — SODIUM BICARBONATE 650 MG/1
650 TABLET ORAL 2 TIMES DAILY
Status: DISCONTINUED | OUTPATIENT
Start: 2023-11-29 | End: 2023-12-08 | Stop reason: HOSPADM

## 2023-11-29 RX ORDER — PIPERACILLIN SODIUM, TAZOBACTAM SODIUM 4; .5 G/20ML; G/20ML
4.5 INJECTION, POWDER, LYOPHILIZED, FOR SOLUTION INTRAVENOUS ONCE
Status: DISCONTINUED | OUTPATIENT
Start: 2023-11-29 | End: 2023-11-29

## 2023-11-29 RX ORDER — ACETAMINOPHEN 325 MG/1
650 TABLET ORAL EVERY 4 HOURS PRN
Status: DISCONTINUED | OUTPATIENT
Start: 2023-11-29 | End: 2023-12-08 | Stop reason: HOSPADM

## 2023-11-29 RX ADMIN — VANCOMYCIN HYDROCHLORIDE 1750 MG: 10 INJECTION, POWDER, LYOPHILIZED, FOR SOLUTION INTRAVENOUS at 22:40

## 2023-11-29 RX ADMIN — SODIUM CHLORIDE: 9 INJECTION, SOLUTION INTRAVENOUS at 23:01

## 2023-11-29 RX ADMIN — SODIUM CHLORIDE, PRESERVATIVE FREE 1000 ML: 5 INJECTION INTRAVENOUS at 15:39

## 2023-11-29 RX ADMIN — LEVOFLOXACIN 750 MG: 5 INJECTION, SOLUTION INTRAVENOUS at 17:50

## 2023-11-29 ASSESSMENT — ACTIVITIES OF DAILY LIVING (ADL)
ADLS_ACUITY_SCORE: 37

## 2023-11-29 NOTE — H&P
[facility]  History and Physical   Hospitalist  Helio Galan MD       Keyon Farias MRN# [id number]   YOB: 1960 Age: 63 year old      Date of Admission:  11/29/2023         Assessment and Plan:   Keyon Farias is a 63 year old male with PMH significant for Hx of TBI with aphasia, R sided spastic hemiplegia, chronic hemiplegia and chronic dysphagia with GJ-tube for TFs/meds, seizure disorder ,  panhypopituitarism and hx of prior hospitalizations for recurrent pneumonia.     Recently he was admitted in  8/2023, then 9/2023 at which time he was noted with septic shock from pneumonia and required intubation that hospital stay. Respiratory cultures grew MR FARR and Pseudomonas and he was treated with IV vancomycin and Zosyn and then changed to ciprofloxacin. He then got admitted again 10/25/23, treated for COVID pneumonia with Remdesivir and dexamethasone.     He then has had three ED visits in past four days (on 11/26, 11/28 and then 11/29) for bronchitis and has not been able to get ciprofloxacin as prescribed. His mother notes that he has been very somnolent, was noted hypotensive to SBP 80s which responded to increased fluids from feeding tube. He was also noted with low grade fever.    Likely early sepsis from recurrent aspiration pneumonia  Somnolence, metabolic encephalopathy likely secondary to above  Recent h/o COVID (10/25/2023), likely COVID recovered  recent hospitalization for septic shock from pneumonia requiring intubation (9/2023)  * Has hx of several prior hospitalizations for aspiration pneumonitis with recent hospitalizations and initial presentation as noted above  -here afebrile, tachycardic to 110s, BP stable, lactate 1.8, WBC 13.9  -blood cultures from 11/26 with no growth so far; blood cultures from 11/29 pending  -COVID noted persistently positive from 11/28; influenza and RSV negative  -chest x-ray noted slight worsening of right basilar opacities, likely  worsening infection or atelectasis    -Clinically does not appear septic but he is prone to sepsis with recurrent pneumonia and usually responds well to couple doses of IV antibiotics   -was ordered for vancomycin and levofloxacin in ED  -will start IV Zosyn  -will start normal saline at 75 ML per hour  -PRN Tylenol for fever  -might need stress dose steroids become hypotensive  -at this point would consider him COVID recovered and no need for special precautions or COVID therapeutics  -will monitor CBC, fever curve  -resume PTA inhalers/nebs when verified by pharmacy  -respiratory status currently stable on room air     mild hypokalemia  mild acute renal failure  * Labs on admission notable for K 3.4, Cr 1.14 (baseline 0.8-1.0)  -IVF as above  -will supplement potassium per protocol; monitor BMP     Spastic hemiplegia  Traumatic brain injury  Nonverbal  Chronic right-sided paresis  Chronic dysphagia, s/p GJ tube  Clogged J tube  * Bed-bound and essentially nonverbal at baseline. Takes meds via G-tube. 5 cans Jevity/d via Gtube.  * Chronic and stable on home meds, including Tegretol and Brivaracetam  *will consult nutrition  -resume PTA seizure medications when verified by pharmacy     Panhypopituitarism  Hypothyroidism  * Chronic and stable on home meds, including hydrocortisone and Synthroid   -will resume PTA hydrocortisone when verified by pharmacy  -might need stress dose steroids if becomes hypotensive     GERD  * Chronic and stable on PPI     Clinically Significant Risk Factors Present on Admission                             # Financial/Environmental Concerns:                DVT prophylaxis: WEN tapia  Code status: full code    Care plan discussed with ED provider, patient and his mom Savannah over the phone           Primary Care Physician:   Elsa Queen [phone]         Chief Complaint:     Somnolence, hypertension, low-grade fever    History is obtained from the chart and his mom over the phone          History of Present Illness:     Keyon Farias is a 63 year old male with PMH significant for Hx of TBI with aphasia, R sided spastic hemiplegia, chronic hemiplegia and chronic dysphagia with GJ-tube for TFs/meds, seizure disorder ,  panhypopituitarism and hx of prior hospitalizations for recurrent pneumonia.     Recently he was admitted in  8/2023, then 9/2023 at which time he was noted with septic shock from pneumonia and required intubation that hospital stay. Respiratory cultures grew MR  and Pseudomonas and he was treated with IV vancomycin and Zosyn and then changed to ciprofloxacin. He then got admitted again 10/25/23, treated for COVID pneumonia with Remdesivir and dexamethasone.     He then has had three ED visits in past four days (on 11/26, 11/28 and then 11/29) for bronchitis and has not been able to get ciprofloxacin as prescribed. His mother notes that he has been very somnolent, was noted hypotensive to SBP 80s which responded to increased fluids from feeding tube. He was also noted with low grade fever.    He was seen here by Dr. Gordon.  -here afebrile, tachycardic to 110s, BP stable, lactate 1.8, WBC 13.9  -blood cultures from 11/26 with no growth so far; blood cultures from 11/29 pending  -COVID noted persistently positive from 11/28; influenza and RSV negative  -chest x-ray noted slight worsening of right basilar opacities, likely worsening infection or atelectasis    -was ordered for vancomycin and levofloxacin in ED and hospitalist was requested admission for further evaluation.           Past Medical History:     Hx of TBI with aphasia  R sided spastic hemiplegia  chronic hemiplegia and chronic dysphagia with GJ-tube for TFs/meds  seizure disorder  panhypopituitarism   hx of prior hospitalizations for recurrent pneumonia.  Recent admission for septic shock from pneumonia and required intubation (9/2023)  10/25/23, treated for COVID pneumonia with Remdesivir and dexamethasone.            Past Surgical History:     Past Surgical History:   Procedure Laterality Date    ENDOSCOPIC ULTRASOUND UPPER GASTROINTESTINAL TRACT (GI) N/A 1/30/2017    Procedure: ENDOSCOPIC ULTRASOUND, ESOPHAGOSCOPY / UPPER GASTROINTESTINAL TRACT (GI);  Surgeon: Jus Montana MD;  Location: [facility] OR    ENDOSCOPIC ULTRASOUND, ESOPHAGOSCOPY, GASTROSCOPY, DUODENOSCOPY (EGD), NECROSECTOMY N/A 2/7/2017    Procedure: ENDOSCOPIC ULTRASOUND, ESOPHAGOSCOPY, GASTROSCOPY, DUODENOSCOPY (EGD), NECROSECTOMY;  Surgeon: Jack Marcus MD;  Location: [facility] OR    ESOPHAGOSCOPY, GASTROSCOPY, DUODENOSCOPY (EGD), COMBINED  3/13/2014    Procedure: COMBINED ESOPHAGOSCOPY, GASTROSCOPY, DUODENOSCOPY (EGD), BIOPSY SINGLE OR MULTIPLE;  gastroscopy;  Surgeon: Digna Rhodes MD;  Location:  GI    ESOPHAGOSCOPY, GASTROSCOPY, DUODENOSCOPY (EGD), COMBINED N/A 12/6/2016    Procedure: COMBINED ESOPHAGOSCOPY, GASTROSCOPY, DUODENOSCOPY (EGD);  Surgeon: Digna Rhodes MD;  Location: [facility]    ESOPHAGOSCOPY, GASTROSCOPY, DUODENOSCOPY (EGD), COMBINED N/A 2/7/2017    Procedure: COMBINED ENDOSCOPIC ULTRASOUND, ESOPHAGOSCOPY, GASTROSCOPY, DUODENOSCOPY (EGD), FINE NEEDLE ASPIRATE/BIOPSY;  Surgeon: Too Thakur MD;  [facility]    HEAD & NECK SURGERY      reconstructive facial surgery following accident in 1989    IR FOLLOW UP VISIT INPATIENT  2/20/2019    IR GASTRO JEJUNOSTOMY TUBE CHANGE  12/20/2018    IR GASTRO JEJUNOSTOMY TUBE CHANGE  2/4/2019    IR GASTRO JEJUNOSTOMY TUBE CHANGE  3/8/2019    IR GASTRO JEJUNOSTOMY TUBE CHANGE  8/7/2019    IR GASTRO JEJUNOSTOMY TUBE CHANGE  1/13/2020    IR GASTRO JEJUNOSTOMY TUBE CHANGE  1/30/2020    IR GASTRO JEJUNOSTOMY TUBE CHANGE  6/24/2020    IR GASTRO JEJUNOSTOMY TUBE CHANGE  9/17/2020    IR GASTRO JEJUNOSTOMY TUBE CHANGE  10/14/2020    IR GASTRO JEJUNOSTOMY TUBE CHANGE  2/16/2021    IR GASTRO JEJUNOSTOMY TUBE CHANGE  5/6/2021    IR GASTRO JEJUNOSTOMY TUBE CHANGE  5/25/2021    IR  GASTRO JEJUNOSTOMY TUBE CHANGE  2021    IR GASTRO JEJUNOSTOMY TUBE CHANGE  2021    IR GASTRO JEJUNOSTOMY TUBE CHANGE  2021    IR GASTRO JEJUNOSTOMY TUBE CHANGE  3/18/2022    IR GASTRO JEJUNOSTOMY TUBE CHANGE  2022    IR GASTRO JEJUNOSTOMY TUBE CHANGE  2022    IR GASTRO JEJUNOSTOMY TUBE CHANGE  2022    IR GASTRO JEJUNOSTOMY TUBE CHANGE  2023    IR GASTRO JEJUNOSTOMY TUBE CHANGE  8/10/2023    IR GASTRO JEJUNOSTOMY TUBE CHANGE  2023    IR GASTRO JEJUNOSTOMY TUBE CHANGE  2023    IR PICC EXCHANGE LEFT  8/15/2019    LAPAROSCOPIC APPENDECTOMY  2013    Procedure: LAPAROSCOPIC APPENDECTOMY;  LAPAROSCOPIC APPENDECTOMY;  Surgeon: Manish Pierce MD;  Location:  OR    LAPAROSCOPIC ASSISTED INSERTION TUBE GASTROTOMY N/A 2016    Procedure: LAPAROSCOPIC ASSISTED INSERTION TUBE GASTROSTOMY;  Surgeon: Manish Pierce MD;  Location:  OR    ORTHOPEDIC SURGERY      right hand repair    TRACHEOSTOMY N/A 9/3/2016    Procedure: TRACHEOSTOMY;  Surgeon: João Ortiz MD;  Location:  OR    TRACHEOSTOMY N/A 2016    Procedure: TRACHEOSTOMY;  Surgeon: João Ortiz MD;  Location:  OR    VASCULAR SURGERY                Home Medications:     Prior to Admission Medications   Prescriptions Last Dose Informant Patient Reported? Taking?   Brivaracetam (BRIVIACT) 10 MG/ML solution  Mother Yes No   Si mg by Oral or Feeding Tube route 2 times daily 0900, 2100   COMPOUNDED NON-CONTROLLED SUBSTANCE (CMPD RX) - PHARMACY TO MIX COMPOUNDED MEDICATION  Mother No No   Sig: Scopolamine 0.4mg capsules - take  2 capsule by feeding tube three times daily as needed   Cyanocobalamin (VITAMIN B-12 PO)  Mother Yes No   Si,500 mcg by Per Feeding Tube route daily   acetaminophen (TYLENOL) 500 MG tablet  Mother Yes No   Si-750 mg by Per Feeding Tube route every 8 hours as needed for mild pain   albuterol (PROVENTIL) (5 MG/ML) 0.5% neb solution  Mother No No  "  Sig: Take 0.5 mLs (2.5 mg) by nebulization every 6 hours as needed for shortness of breath, wheezing or cough 0700 1100 1500 1900 with mucomyst   Patient not taking: Reported on 10/25/2023   bacitracin 500 UNIT/GM external ointment  Mother No No   Sig: Apply topically daily as needed for wound care To PEG site.   carBAMazepine (TEGRETOL) 100 MG/5ML suspension  Mother Yes No   Si mg by Per Feeding Tube route daily Take at 1800   carBAMazepine (TEGRETOL) 100 MG/5ML suspension  Mother No No   Si.5 mLs (150 mg) by Oral or Feeding Tube route 3 times daily At 06:00, 12:00, and 24:00 for seizures   ciprofloxacin (CIPRO) 500 MG tablet   No No   Sig: Take 1 tablet (500 mg) by mouth 2 times daily for 6 days   ciprofloxacin (CIPRO) 500 MG/5ML (10%) suspension   No No   Sig: Take 5 mLs (500 mg) by mouth 2 times daily   ciprofloxacin (CIPRO) 500 MG/5ML (10%) suspension   No No   Sig: Take 5 mLs (500 mg) by mouth 2 times daily for 6 days   glycopyrrolate (ROBINUL) 1 MG tablet  Mother No No   Sig: Take 1 tablet (1 mg) by mouth 2 times daily as needed (secretions)   guaiFENesin (MUCINEX) 600 MG 12 hr tablet  Mother No No   Sig: Take 1 tablet (600 mg) by mouth 2 times daily as needed for congestion   hydrocortisone (CORTAID) 1 % external cream  Mother Yes No   Sig: Apply topically 2 times daily as needed Apply to reddened memo areas as needed   hydrocortisone (CORTEF) 5 MG tablet  Mother No No   Sig: Take 15 mg (3 tablets) in the morning and 7.5 mg (1.5 tablet)  at 2:00 PM. During illness patient takes more as a stress dose. Please increase the dose as directed.   Patient taking differently: Take 15 mg (3 tablets) in the morning and 5 mg (1 tablet)  at 6:00 PM. Per feeding tube. During illness patient takes more as a stress dose. Mother reports doubling each dose for fever   insulin syringe-needle U-100 (29G X 1/2\" 1 ML) 29G X 1/2\" 1 ML miscellaneous  Mother No No   Sig: Syringe needle use as needed   levothyroxine " (SYNTHROID/LEVOTHROID) 125 MCG tablet   No No   Sig: Take 1 tablet (125 mcg) by mouth daily   metoclopramide (REGLAN) 10 MG/10ML SOLN solution  Mother No No   Sig: Take 10 mLs (10 mg) by mouth 4 times daily (before meals and nightly) 0800, 1200, 1600, 2000 Disconnects bag before administration, then waits 45 mins before reconnecting after giving the medication   miconazole (MICATIN) 2 % external powder  Mother No No   Sig: Apply topically 2 times daily   multivitamin, therapeutic (THERA-VIT) TABS tablet  Mother Yes No   Si tablet by Per Feeding Tube route daily   mupirocin (BACTROBAN) 2 % external ointment  Mother No No   Sig: APPLY TOPICALLY TO THE AFFECTED AREA TWICE DAILY AS NEEDED   pantoprazole (PROTONIX) 2 mg/mL SUSP suspension  Mother No No   Sig: TAKE 20ML PER FEEDING TUBE DAILY   sodium bicarbonate 650 MG tablet  Mother No No   Sig: TAKE 1 TABLET(650 MG) BY MOUTH TWICE DAILY   Patient taking differently: 650 mg by Per Feeding Tube route 2 times daily   testosterone cypionate (DEPOTESTOSTERONE) 200 MG/ML injection  Mother No No   Sig: Inject 0.25 mLs (50 mg) into the muscle once a week   vitamin C (ASCORBIC ACID) 1000 MG TABS  Mother Yes No   Si,000 mg by Oral or Feeding Tube route daily    vitamin D3 (CHOLECALCIFEROL) 2000 units (50 mcg) tablet  Mother Yes No   Si,000 Units by Per Feeding Tube route daily      Facility-Administered Medications: None            Allergies:     Allergies   Allergen Reactions    Valproic Acid Other (See Comments)     Toxicity w/ bone marrow suspension, elevated ammonia levels     Dilantin [Phenytoin Sodium] Other (See Comments)     Severe Trembling    Scopolamine Hives     Hives with the patch - oral no problem            Social History:   Keyon Farias  reports that he quit smoking about 34 years ago. His smoking use included cigarettes. He has never used smokeless tobacco. He reports that he does not drink alcohol and does not use drugs.              Family  History:   Keyon Farias family history includes Diabetes Type 2  in his maternal grandmother; Hypertension in his father; Kidney Cancer in his father; Pulmonary Embolism in his mother.    Family history was reviewed by myself and not pertinent to current presentation.           Review of Systems:   A10 point Review of Systems was done and were negative other than noted in the HPI.             Physical Exam:   Blood pressure 107/48, pulse 83, temperature 98.7  F (37.1  C), temperature source Temporal, resp. rate 13, SpO2 95%.  0 lbs 0 oz        Constitutional: conscious, alert, awake, mostly nonverbal but follows simple commands and interacts with yes/no, head nodding, thumbs up/down; has a slightly sick looking appearance ; resting comfortably in no apparent distress   HEENT: Pupils equal and reactive to light and accomodation,   Oral cavity: Dry oral mucosa ; poor oral hygiene   Cardiovascular: Normal s1 s2, regular rate and rhythm , no murmur   Lungs: B/l clear to auscultation, no wheezes or crepitations   Abdomen: Soft, nontender, nondistended.  feeding tube is in place.  No guarding, rigidity or rebound tenderness   LE : No edema   Musculoskeletal: has spastic hemiplegia on the right and is mostly nonverbal   Neuro:     Psychiatry: normal mood and affect             Data:   All new lab and imaging data was reviewed in Epic.   Significant labs and imagings include:    CMP notable for potassium 3.4, creatinine 1.14, normal LFTs, lactic acid 1.8  CBC with WBC 13.9, hemoglobin 14.3  blood cultures from 11/26 with no growth so far  blood cultures from 11/29 pending  COVID positive 11/28; influenza and RSV negative  chest x-ray 11/29:  IMPRESSION: Slight worsening of right basilar opacities, likely due to  worsening infection or atelectasis. The left lung is clear. Elevated  right hemidiaphragm. No pleural effusion or pneumothorax. Normal heart  size.             Helio Galan MD  Hospitalist

## 2023-11-29 NOTE — TELEPHONE ENCOUNTER
Prior Authorization Retail Medication Request    Medication/Dose: ciprofloxacin (CIPRO) 500 MG/5ML (10%) suspension  Diagnosis and ICD code (if different than what is on RX):    New/renewal/insurance change PA/secondary ins. PA:  Previously Tried and Failed:    Rationale:      Insurance   Primary:   Insurance ID:      Secondary (if applicable):  Insurance ID:      Pharmacy Information (if different than what is on RX)  Name:    Phone:    Fax:

## 2023-11-29 NOTE — TELEPHONE ENCOUNTER
[phone] also called to let us know pt is in ER.  Raissa Recio, RN  [facility] RN Triage Team

## 2023-11-29 NOTE — TELEPHONE ENCOUNTER
Per chart review, patient is currently admitted to [facility].    Closing encounter.    Stephane Velasquez RN  [facility]

## 2023-11-29 NOTE — TELEPHONE ENCOUNTER
PA Initiation    Medication: CIPRO 500 MG/5ML (10%) PO SUSR  Insurance Company: [employer] Part D - Phone [phone] Fax [phone]  Pharmacy Filling the Rx: [facility] - [id number] [facility]  Filling Pharmacy Phone: [phone]  Filling Pharmacy Fax:    Start Date: 11/29/2023     EMS

## 2023-11-29 NOTE — TELEPHONE ENCOUNTER
Savannah, mother and LG of pt calling in stating the patient was in the hospital and was given Rx for abx in liquid form and the pharmacy doesn't have it in liquid form and would need to be ordered.     She also states that they will need a PA from insurance. PA started today 11/29/23.    Savannah is willing to have it sent to a [place] pharmacy anywhere that would have it in stock.     Annie Iqbal RN

## 2023-11-29 NOTE — TELEPHONE ENCOUNTER
Patient Contact    Attempt # 1 - Savannah (Mom/guardian)    Was call answered?  No.  Left message on voicemail with information to call triage back.    Stephane Velasquez RN  [facility]

## 2023-11-29 NOTE — TELEPHONE ENCOUNTER
Patient's wife called and stated that since the cipro suspension is currently under PA review, she wants an Rx for Cipro tablets. Please send an Rx to [facility] if appropriate. They plan on crushing tablets and mixing with water to give via feeding tube. Thank you. Having a tough time getting ahold of the ED.    Brooke Owens [facility] on 11/29/2023 at 3:14 PM

## 2023-11-29 NOTE — TELEPHONE ENCOUNTER
TO PCP:     Pt's home care RNMonica called back with patient's mom on line     Pt was in ED for bronchitis and lethargy yesterday. Given IV cipro then sent home with liquid abx     Patient's mom having trouble finding a place to fill Rx. Called [facility], they don't carry this script, and advised no [facility] in the area carry this either. Pharmacies would have to order it in     Per home care RN, pt is more generally weak/lethargic than normal. HR was 114 today.  O2 was 92% room air/rest .     Fever/lethargy was reason for ER - and per pt's mom pt is the same as he was at discharge     DESCRIPTION: baseline non-verbal. Will open eyes and look at mom but not himself doesn't respond as he normally would   SEVERITY: normally is a nidia lift and cannot walk. Is out of it, normally uses arms and has not been using them  Last 2 months in/out of ER   OTHER: breathing not labored, no pain, coughed up phlegm white/clear last night, no diarrhea or bleeding concerns     Will open eyes when mother touches his face     Not wanting to get up - normally he will look at you and say yes or no. He is not responding currently     Per protocol, advise 911/ER. Pt's mom reluctant to bring him in as they have been repeatedly in/out of the hospital.     Asking that message be sent to PCP to advise on     They are unsure what to do as they cannot find a pharmacy that has the liquid cipro     Please advise    Brokoe GOMEZ, Triage RN  [facility]       Reason for Disposition   Difficult to awaken or acting confused (e.g., disoriented, slurred speech)    Additional Information   Negative: SEVERE difficulty breathing (e.g., struggling for each breath, speaks in single words)   Negative: Shock suspected (e.g., cold/pale/clammy skin, too weak to stand, low BP, rapid pulse)    Protocols used: Weakness (Generalized) and Fatigue-A-OH

## 2023-11-29 NOTE — PHARMACY-CONSULT NOTE
Antibiotic Recommendations provided at the request of ED physician (Dr. Gordon) are as follows:    Levofloxacin 750 mg Daily  Vancomycin (loading dose)  MRSA nares swab    Patient has recent (9/26/23) history of resistant pseudomonal and MRSA pneumonia with results and susceptibilities from bronch washings below.     0107 10/01/2023 1017 Respiratory Aerobic Bacterial Culture [[id number]]    (Abnormal)   Washings from Lung, Right    Final result Component Value   Culture 2+ Normal jaime    2+ Pseudomonas aeruginosa Abnormal     2+ Pseudomonas aeruginosa Abnormal     3+ Staphylococcus aureus MRSA Abnormal     1+ Staphylococcus aureus MRSA Abnormal        Susceptibility     Pseudomonas aeruginosa (2) Pseudomonas aeruginosa (3) Staphylococcus aureus MRSA (4) Staphylococcus aureus MRSA (5)    TORY TORY TORY TORY    Amikacin <=2 ug/mL Susceptible <=2 ug/mL Susceptible        Cefepime 4 ug/mL Susceptible 4 ug/mL Susceptible        Ceftazidime 8 ug/mL Susceptible 8 ug/mL Susceptible        Ciprofloxacin <=0.25 ug/mL Susceptible <=0.25 ug/mL Susceptible >=8 ug/mL Resistant * >=8 ug/mL Resistant *    Clindamycin      Resistant 1  Resistant 1    Daptomycin     0.5 ug/mL Susceptible * 0.25 ug/mL Susceptible *    Doxycycline     <=0.5 ug/mL Susceptible <=0.5 ug/mL Susceptible    Erythromycin     >=8 ug/mL Resistant  Resistant    Gentamicin <=1 ug/mL Susceptible <=1 ug/mL Susceptible <=0.5 ug/mL Susceptible <=0.5 ug/mL Susceptible    Inducible macrolide resistance test     Positive ug/mL Positive * Positive ug/mL Positive *    Levofloxacin 0.5 ug/mL Susceptible 0.5 ug/mL Susceptible >=8 ug/mL Resistant * >=8 ug/mL Resistant *    Linezolid     2 ug/mL Susceptible 2 ug/mL Susceptible    Meropenem 4 ug/mL Intermediate 8.0 ug/mL Resistant        Nitrofurantoin     <=16 ug/mL Susceptible * <=16 ug/mL Susceptible *    Oxacillin     >=4 ug/mL Resistant 2 >=4 ug/mL Resistant 2    Piperacillin/Tazobactam >64 ug/mL Resistant >64 ug/mL  Resistant        Rifampin     <=0.5 ug/mL Susceptible * <=0.5 ug/mL Susceptible *    Tetracycline     <=1 ug/mL Susceptible <=1 ug/mL Susceptible    Tigecycline     <=0.12 ug/mL Susceptible * <=0.12 ug/mL Susceptible *    Tobramycin <=1 ug/mL Susceptible <=1 ug/mL Susceptible        Trimethoprim/Sulfamethoxazole     <=0.5/9.5 u... Susceptible <=0.5/9.5 u... Susceptible    Vancomycin     1 ug/mL Susceptible <=0.5 ug/mL Susceptible                   * Suppressed Antibiotic   1 This isolate is presumed to be clindamycin resistant based on detection of inducible clindamycin resistance. Erythromycin and clindamycin are resistant; therefore, they are not recommended for use.   2 Oxacillin susceptible isolates are susceptible to cephalosporins (example: cefazolin and cephalexin) and beta lactam combination agents. Oxacillin resistant isolates are resistant to these agents.           With previous P. Aeruginosa resistant to Zosyn and Meropenem, in addition to seizure history, recommended Levaquin over cefepime.    Vancomycin recommended given fluoroquinolone resistance of previously cultured MRSA until MRSA nares results.    Saqib Andujar RPH on 11/29/2023 at 5:46 PM     Attending Only

## 2023-11-29 NOTE — ED NOTES
[facility]  ED Nurse Handoff Report    ED Chief complaint: Altered Mental Status      ED Diagnosis:   Final diagnoses:   Sepsis without acute organ dysfunction, due to unspecified organism (H)   Pneumonia of right lower lobe due to infectious organism       Code Status: Per admitting    Allergies:   Allergies   Allergen Reactions    Valproic Acid Other (See Comments)     Toxicity w/ bone marrow suspension, elevated ammonia levels     Dilantin [Phenytoin Sodium] Other (See Comments)     Severe Trembling    Scopolamine Hives     Hives with the patch - oral no problem       Patient Story:   Pt brought by ambulance for altered mental status. Pt was in ED yesterday for bronchitis. Pt unable to get his medications for the GJ tube. Elevated WBCs,     Focused Assessment:    Neuro: HECTOR orientation, pt doesn't speak  Respiratory: shallow respirations  Cardiology:  denies CP   Gastrointestinal: soft, non tender, non distended   Genitourinary/Renal:    Musculoskeletal: quadrapalegia  Skin: Intact skin   Lines: 18 G L forearm    Labs Ordered and Resulted from Time of ED Arrival to Time of ED Departure   COMPREHENSIVE METABOLIC PANEL - Abnormal       Result Value    Sodium 135      Potassium 3.4      Carbon Dioxide (CO2) 30 (*)     Anion Gap 12      Urea Nitrogen 22.2      Creatinine 1.14      GFR Estimate 73      Calcium 8.5 (*)     Chloride 93 (*)     Glucose 112 (*)     Alkaline Phosphatase 107      AST 33      ALT 20      Protein Total 7.8      Albumin 3.8      Bilirubin Total 0.4     CBC WITH PLATELETS AND DIFFERENTIAL - Abnormal    WBC Count 13.9 (*)     RBC Count 4.89      Hemoglobin 14.3      Hematocrit 44.3      MCV 91      MCH 29.2      MCHC 32.3      RDW 14.7      Platelet Count 218      % Neutrophils 58      % Lymphocytes 25      % Monocytes 10      % Eosinophils 6      % Basophils 1      % Immature Granulocytes 0      NRBCs per 100 WBC 0      Absolute Neutrophils 8.0      Absolute Lymphocytes 3.5       Absolute Monocytes 1.4 (*)     Absolute Eosinophils 0.9 (*)     Absolute Basophils 0.1      Absolute Immature Granulocytes 0.1      Absolute NRBCs 0.0     ISTAT GASES LACTATE VENOUS POCT - Abnormal    Lactic Acid POCT 1.8      Bicarbonate Venous POCT 33 (*)     O2 Sat, Venous POCT 43 (*)     pCO2 Venous POCT 56 (*)     pH Venous POCT 7.38      pO2 Venous POCT 25     ROUTINE UA WITH MICROSCOPIC REFLEX TO CULTURE   BLOOD CULTURE   BLOOD CULTURE        XR Chest Port 1 View   Final Result   IMPRESSION: Slight worsening of right basilar opacities, likely due to   worsening infection or atelectasis. The left lung is clear. Elevated   right hemidiaphragm. No pleural effusion or pneumothorax. Normal heart   size.      JULIÁN MURO MD            SYSTEM ID:  [id number]            Treatments and/or interventions provided:      Medications   sodium chloride (PF) 0.9% PF flush 3 mL (has no administration in time range)   sodium chloride (PF) 0.9% PF flush 3 mL (3 mLs Intracatheter $Given 11/29/23 1539)   levofloxacin (LEVAQUIN) infusion 750 mg (has no administration in time range)   vancomycin (VANCOCIN) 1,750 mg in 0.9% NaCl 500 mL intermittent infusion (has no administration in time range)   sodium chloride 0.9% BOLUS 1,000 mL (1,000 mLs Intravenous $New Bag 11/29/23 1539)        Patient's response to treatments and/or interventions:   Resting comfortably    To be done/followed up on inpatient unit:    See any in-patient orders    Does this patient have any cognitive concerns?: HECTOR    Activity level - Baseline/Home:    Total Care    Activity Level - Current:     Total Care    Patient's Preferred language: English     Needed?: No    Isolation: None and COVID r/o and special precautions  Infection: Not Applicable  COVID r/o and special precautions  Patient tested for COVID 19 prior to admission: YES    Bariatric?: No    Vital Signs:   Vitals:    11/29/23 1448 11/29/23 1600   BP: 107/48    Pulse: 110 83   Resp: 20 13    Temp: 98.7  F (37.1  C)    TempSrc: Temporal    SpO2: 95%        Cardiac Rhythm:Cardiac Rhythm: Normal sinus rhythm    Was the PSS-3 completed:   Yes  What interventions are required if any?        none       Family Comments: mom aware  OBS brochure/video discussed/provided to patient/family: No              Name of person given brochure if not patient:              Relationship to patient:    For the majority of the shift this patient's behavior was Green.   Behavioral interventions performed were none    ED NURSE PHONE NUMBER: *[phone]

## 2023-11-29 NOTE — ED PROVIDER NOTES
History   Chief Complaint:  Altered Mental Status    HPI:  Keyon Farias is a very pleasant 63 year old male presenting with altered mental status. Patient was seen yesterday. This morning, Keyon was not verbally responsive to questions asked by caregiver. Normally he is able to answer with yes or no.  I obtained additional history from the patient's mother.  She states that he was less verbally responsive than normal and seemed more confused.  He also seem to have increased difficulty breathing.  Of note, the patient was here yesterday with similar symptoms but also a fever.  Patient did recently have COVID but reportedly recovered from that.  He is still testing positive for COVID, not unexpectedly given timeframe.  He was thought to have bronchitis and was discharged on ciprofloxacin.    Patient is a complex medical history and has had frequent presentations to this emergency department.  Medical history includes flaccid hemiplegia, wheelchair dependence, history of TBI, frequent aspiration pneumonia, among others    Independent Historian:  History provided by nurse and EMS.  Additional history was obtained from the patient's mother by telephone.    Review of External Notes:  I personally reviewed notes from the patient's telephone encounter dated today. This provided me with information regarding patient's recent clinical course.     I personally reviewed the patient's chart, including available medication list and available past medical history, past surgical history, family history, and social history.    Physical Exam   Patient Vitals for the past 24 hrs:   BP Temp Temp src Pulse Resp SpO2   11/29/23 1600 -- -- -- 83 13 --   11/29/23 1448 107/48 98.7  F (37.1  C) Temporal 110 20 95 %      Physical Exam  Vitals and nursing note reviewed.   Constitutional:       Appearance: He is obese. He is ill-appearing.      Comments: Chronically ill appearing male   Eyes:      Extraocular Movements: Extraocular movements  intact.      Pupils: Pupils are equal, round, and reactive to light.   Cardiovascular:      Rate and Rhythm: Regular rhythm. Tachycardia present.      Heart sounds: Normal heart sounds.   Pulmonary:      Effort: Pulmonary effort is normal.      Breath sounds: Rhonchi and rales present. No wheezing.   Musculoskeletal:      Cervical back: Neck supple.   Skin:     General: Skin is warm and dry.   Neurological:      Mental Status: Mental status is at baseline.      Comments: Patient does not verbalize.  He gives thumbs up and answer to some questions            Emergency Department Course     Imaging & ECG: Laboratory:   No ECG performed.    XR Chest Port 1 View   Final Result   IMPRESSION: Slight worsening of right basilar opacities, likely due to   worsening infection or atelectasis. The left lung is clear. Elevated   right hemidiaphragm. No pleural effusion or pneumothorax. Normal heart   size.      JULIÁN MURO MD            SYSTEM ID:  [id number]         Report per radiology.   Labs Ordered and Resulted from Time of ED Arrival to Time of ED Departure   COMPREHENSIVE METABOLIC PANEL - Abnormal       Result Value    Sodium 135      Potassium 3.4      Carbon Dioxide (CO2) 30 (*)     Anion Gap 12      Urea Nitrogen 22.2      Creatinine 1.14      GFR Estimate 73      Calcium 8.5 (*)     Chloride 93 (*)     Glucose 112 (*)     Alkaline Phosphatase 107      AST 33      ALT 20      Protein Total 7.8      Albumin 3.8      Bilirubin Total 0.4     CBC WITH PLATELETS AND DIFFERENTIAL - Abnormal    WBC Count 13.9 (*)     RBC Count 4.89      Hemoglobin 14.3      Hematocrit 44.3      MCV 91      MCH 29.2      MCHC 32.3      RDW 14.7      Platelet Count 218      % Neutrophils 58      % Lymphocytes 25      % Monocytes 10      % Eosinophils 6      % Basophils 1      % Immature Granulocytes 0      NRBCs per 100 WBC 0      Absolute Neutrophils 8.0      Absolute Lymphocytes 3.5      Absolute Monocytes 1.4 (*)     Absolute Eosinophils  0.9 (*)     Absolute Basophils 0.1      Absolute Immature Granulocytes 0.1      Absolute NRBCs 0.0     ISTAT GASES LACTATE VENOUS POCT - Abnormal    Lactic Acid POCT 1.8      Bicarbonate Venous POCT 33 (*)     O2 Sat, Venous POCT 43 (*)     pCO2 Venous POCT 56 (*)     pH Venous POCT 7.38      pO2 Venous POCT 25     ROUTINE UA WITH MICROSCOPIC REFLEX TO CULTURE - Abnormal    Color Urine Yellow      Appearance Urine Clear      Glucose Urine Negative      Bilirubin Urine Negative      Ketones Urine Negative      Specific Gravity Urine 1.035      Blood Urine Negative      pH Urine 8.5 (*)     Protein Albumin Urine 50 (*)     Urobilinogen Urine 2.0      Nitrite Urine Negative      Leukocyte Esterase Urine Negative      Mucus Urine Present (*)     RBC Urine 1      WBC Urine 5      Squamous Epithelials Urine <1     BLOOD CULTURE   BLOOD CULTURE   MRSA MSSA PCR, NASAL SWAB         Interventions & Assessments:     Interventions:  Medications   sodium chloride (PF) 0.9% PF flush 3 mL (has no administration in time range)   sodium chloride (PF) 0.9% PF flush 3 mL (3 mLs Intracatheter $Given 11/29/23 1539)   vancomycin (VANCOCIN) 1,750 mg in 0.9% NaCl 500 mL intermittent infusion (has no administration in time range)   sodium chloride 0.9% BOLUS 1,000 mL (0 mLs Intravenous Stopped 11/29/23 1751)   levofloxacin (LEVAQUIN) infusion 750 mg (750 mg Intravenous $New Bag 11/29/23 1750)      Assessments:  ED Course as of 11/29/23 2015 Wed Nov 29, 2023   1513 I obtained the history and examined the patient as noted above.      1551 XR Chest Port 1 View  I independently interpreted the patient's chest x-ray; elevation of right hemidiaphragm; no clear evidence of infiltrate     1659 I spoke with Dr. Galan from the hospitalist service regarding the patient's presentation, findings here in the ED, and plan of care.       Independent Interpretation (X-rays, CTs, rhythm strip):  I independently interpreted the patient's chest x-ray; do  appreciate right lower lobe infiltrate    Consultations/Discussion of Management or Tests:  I discussed the patient's presentation, workup, assessment, plan and disposition with hospitalist Dr. Galan.    Social Determinants of Health affecting care:   None.      Disposition:  The patient was admitted to the hospital under the care of Dr. Galan.     Impression & Plan     Medical Decision Making:  Patient with complex medical history presenting with change in mental status and tachycardia.  Patient meets sepsis criteria with leukocytosis and tachycardia upon arrival.  Otherwise, vital signs reassuring.  Given patient's presentation yesterday with fever and increased work of breathing, high suspicion for pneumonia upon arrival.  Chest x-ray did appear to show some increased infiltrates.  Did obtain blood cultures prior to antibiotics.  Also obtain urinalysis to rule out urinary tract infection.  This was reassuring.  Discussed with hospitalist and pharmacy patient's extensive history with antibiotics and bacterial infections.  It seems that he recently had cultures growing Pseudomonas that were resistant to piperacillin/tazobactam.  For this reason, after review of cultures, will plan for treatment with levofloxacin and vancomycin.  Patient will be admitted to medicine for further evaluation and management    Diagnosis:    ICD-10-CM    1. Sepsis without acute organ dysfunction, due to unspecified organism (H)  A41.9       2. Pneumonia of right lower lobe due to infectious organism  J18.9          Discharge Medications:  New Prescriptions    No medications on file     Scribe Disclosure:  I, Jossie Figueredo, am serving as a scribe at 3:17 PM on 11/29/2023 to document services personally performed by Luigi Gordon MD based on my observations and the provider's statements to me.       Luigi Gordon MD  11/29/23 2017

## 2023-11-29 NOTE — ED NOTES
Bed: ED01  Expected date:   Expected time:   Means of arrival:   Comments:  Mondamin - 63 M lethargy sepsis eta 1441

## 2023-11-29 NOTE — ED TRIAGE NOTES
Pt BIBA from home by EMS. Pt was reportedly seen in the ED yesterday for bronchitis. Pt unable to get antibiotics. Pt more lethargic today, warm to touch, and unable to participate in conversations with wife. 95% O2 RA     Triage Assessment (Adult)       Row Name 11/29/23 1449          Triage Assessment    Airway WDL WDL        Respiratory WDL    Respiratory WDL X  crackles        Skin Circulation/Temperature WDL    Skin Circulation/Temperature WDL X  right forearm wrapped        Cardiac WDL    Cardiac WDL X     Cardiac Rhythm ST        Peripheral/Neurovascular WDL    Peripheral Neurovascular WDL WDL        Cognitive/Neuro/Behavioral WDL    Cognitive/Neuro/Behavioral WDL X     Level of Consciousness alert     Arousal Level opens eyes spontaneously     Orientation other (see comments)  HECTOR     Speech unable to speak     Mood/Behavior cooperative        Pupils (CN II)    Pupil PERRLA yes        Rea Coma Scale    Best Eye Response 4-->(E4) spontaneous     Best Motor Response 6-->(M6) obeys commands     Best Verbal Response 2-->(V2) incomprehensible speech  baseline hx tbi     Rea Coma Scale Score 12

## 2023-11-30 LAB
ENTEROCOCCUS FAECALIS: NOT DETECTED
ENTEROCOCCUS FAECIUM: NOT DETECTED
GLUCOSE BLDC GLUCOMTR-MCNC: 110 MG/DL (ref 70–99)
LISTERIA SPECIES (DETECTED/NOT DETECTED): NOT DETECTED
MAGNESIUM SERPL-MCNC: 2.5 MG/DL (ref 1.7–2.3)
PHOSPHATE SERPL-MCNC: 2.9 MG/DL (ref 2.5–4.5)
STAPHYLOCOCCUS AUREUS: NOT DETECTED
STAPHYLOCOCCUS EPIDERMIDIS: NOT DETECTED
STAPHYLOCOCCUS LUGDUNENSIS: NOT DETECTED
STAPHYLOCOCCUS SPECIES: DETECTED
STREPTOCOCCUS AGALACTIAE: NOT DETECTED
STREPTOCOCCUS ANGINOSUS GROUP: NOT DETECTED
STREPTOCOCCUS PNEUMONIAE: NOT DETECTED
STREPTOCOCCUS PYOGENES: NOT DETECTED
STREPTOCOCCUS SPECIES: NOT DETECTED

## 2023-11-30 PROCEDURE — 250N000011 HC RX IP 250 OP 636: Mod: JZ | Performed by: HOSPITALIST

## 2023-11-30 PROCEDURE — 250N000013 HC RX MED GY IP 250 OP 250 PS 637: Performed by: HOSPITALIST

## 2023-11-30 PROCEDURE — 258N000003 HC RX IP 258 OP 636: Performed by: HOSPITALIST

## 2023-11-30 PROCEDURE — 99232 SBSQ HOSP IP/OBS MODERATE 35: CPT | Performed by: HOSPITALIST

## 2023-11-30 PROCEDURE — 120N000001 HC R&B MED SURG/OB

## 2023-11-30 RX ORDER — METOCLOPRAMIDE HYDROCHLORIDE 5 MG/5ML
10 SOLUTION ORAL
Status: DISCONTINUED | OUTPATIENT
Start: 2023-11-30 | End: 2023-12-08 | Stop reason: HOSPADM

## 2023-11-30 RX ORDER — AMOXICILLIN 250 MG
2 CAPSULE ORAL 2 TIMES DAILY PRN
Status: DISCONTINUED | OUTPATIENT
Start: 2023-11-30 | End: 2023-12-08 | Stop reason: HOSPADM

## 2023-11-30 RX ORDER — DEXTROSE MONOHYDRATE 100 MG/ML
INJECTION, SOLUTION INTRAVENOUS CONTINUOUS PRN
Status: DISCONTINUED | OUTPATIENT
Start: 2023-11-30 | End: 2023-12-08 | Stop reason: HOSPADM

## 2023-11-30 RX ORDER — AMOXICILLIN 250 MG
1 CAPSULE ORAL 2 TIMES DAILY PRN
Status: DISCONTINUED | OUTPATIENT
Start: 2023-11-30 | End: 2023-12-08 | Stop reason: HOSPADM

## 2023-11-30 RX ORDER — GLYCOPYRROLATE 1 MG/1
1 TABLET ORAL 2 TIMES DAILY PRN
Status: DISCONTINUED | OUTPATIENT
Start: 2023-11-30 | End: 2023-12-08 | Stop reason: HOSPADM

## 2023-11-30 RX ORDER — LEVOFLOXACIN 5 MG/ML
750 INJECTION, SOLUTION INTRAVENOUS EVERY 24 HOURS
Status: DISCONTINUED | OUTPATIENT
Start: 2023-11-30 | End: 2023-12-08 | Stop reason: HOSPADM

## 2023-11-30 RX ORDER — GLYCOPYRROLATE 1 MG/1
1 TABLET ORAL 2 TIMES DAILY PRN
Status: DISCONTINUED | OUTPATIENT
Start: 2023-11-30 | End: 2023-11-30

## 2023-11-30 RX ORDER — CEFAZOLIN SODIUM 1 G/50ML
750 SOLUTION INTRAVENOUS EVERY 12 HOURS
Status: DISCONTINUED | OUTPATIENT
Start: 2023-11-30 | End: 2023-12-01

## 2023-11-30 RX ADMIN — BRIVARACETAM 100 MG: 10 SOLUTION ORAL at 09:09

## 2023-11-30 RX ADMIN — ACETAMINOPHEN 650 MG: 325 TABLET, FILM COATED ORAL at 05:04

## 2023-11-30 RX ADMIN — METOCLOPRAMIDE HYDROCHLORIDE 10 MG: 5 SOLUTION ORAL at 20:09

## 2023-11-30 RX ADMIN — LEVOTHYROXINE SODIUM 125 MCG: 100 TABLET ORAL at 09:07

## 2023-11-30 RX ADMIN — PIPERACILLIN AND TAZOBACTAM 3.38 G: 3; .375 INJECTION, POWDER, FOR SOLUTION INTRAVENOUS at 09:06

## 2023-11-30 RX ADMIN — HYDROCORTISONE 15 MG: 10 TABLET ORAL at 09:08

## 2023-11-30 RX ADMIN — HYDROCORTISONE SODIUM SUCCINATE 50 MG: 100 INJECTION, POWDER, FOR SOLUTION INTRAMUSCULAR; INTRAVENOUS at 20:09

## 2023-11-30 RX ADMIN — LEVOFLOXACIN 750 MG: 5 INJECTION, SOLUTION INTRAVENOUS at 16:22

## 2023-11-30 RX ADMIN — VANCOMYCIN HYDROCHLORIDE 750 MG: 1 INJECTION, POWDER, LYOPHILIZED, FOR SOLUTION INTRAVENOUS at 22:07

## 2023-11-30 RX ADMIN — BRIVARACETAM 100 MG: 10 SOLUTION ORAL at 20:09

## 2023-11-30 RX ADMIN — METOCLOPRAMIDE HYDROCHLORIDE 10 MG: 5 SOLUTION ORAL at 16:22

## 2023-11-30 RX ADMIN — PIPERACILLIN AND TAZOBACTAM 3.38 G: 3; .375 INJECTION, POWDER, FOR SOLUTION INTRAVENOUS at 14:15

## 2023-11-30 RX ADMIN — CARBAMAZEPINE 150 MG: 100 SUSPENSION ORAL at 05:04

## 2023-11-30 RX ADMIN — BRIVARACETAM 100 MG: 10 SOLUTION ORAL at 01:33

## 2023-11-30 RX ADMIN — CARBAMAZEPINE 150 MG: 100 SUSPENSION ORAL at 11:38

## 2023-11-30 RX ADMIN — Medication 40 MG: at 09:10

## 2023-11-30 RX ADMIN — CARBAMAZEPINE 100 MG: 100 SUSPENSION ORAL at 17:30

## 2023-11-30 RX ADMIN — HYDROCORTISONE SODIUM SUCCINATE 50 MG: 100 INJECTION, POWDER, FOR SOLUTION INTRAMUSCULAR; INTRAVENOUS at 16:22

## 2023-11-30 RX ADMIN — METOCLOPRAMIDE HYDROCHLORIDE 10 MG: 5 SOLUTION ORAL at 11:37

## 2023-11-30 RX ADMIN — CARBAMAZEPINE 150 MG: 100 SUSPENSION ORAL at 01:33

## 2023-11-30 RX ADMIN — METOCLOPRAMIDE HYDROCHLORIDE 10 MG: 5 SOLUTION ORAL at 09:06

## 2023-11-30 RX ADMIN — SODIUM BICARBONATE 650 MG TABLET 650 MG: at 09:08

## 2023-11-30 RX ADMIN — VANCOMYCIN HYDROCHLORIDE 750 MG: 1 INJECTION, POWDER, LYOPHILIZED, FOR SOLUTION INTRAVENOUS at 11:28

## 2023-11-30 RX ADMIN — SODIUM BICARBONATE 650 MG TABLET 650 MG: at 20:09

## 2023-11-30 ASSESSMENT — ACTIVITIES OF DAILY LIVING (ADL)
DEPENDENT_IADLS:: CLEANING;COOKING;LAUNDRY;SHOPPING;MEAL PREPARATION;MEDICATION MANAGEMENT;MONEY MANAGEMENT;TRANSPORTATION;INCONTINENCE
ADLS_ACUITY_SCORE: 55
ADLS_ACUITY_SCORE: 55
ADLS_ACUITY_SCORE: 51
ADLS_ACUITY_SCORE: 41
ADLS_ACUITY_SCORE: 55
ADLS_ACUITY_SCORE: 49
ADLS_ACUITY_SCORE: 37
ADLS_ACUITY_SCORE: 51
ADLS_ACUITY_SCORE: 41
ADLS_ACUITY_SCORE: 37
ADLS_ACUITY_SCORE: 41
ADLS_ACUITY_SCORE: 51

## 2023-11-30 NOTE — PROGRESS NOTES
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 Scattered scabs. Redness above memo area. Blanchable redness on coccyx, mepilex and barrier cream applied  Activity: A2 lift   Diet: NPO, chronic dysphagia- GJ tube in place    Summary: Pt stable, contact/special precautions maintained

## 2023-11-30 NOTE — TELEPHONE ENCOUNTER
Prior Authorization Approval    Medication: CIPRO 500 MG/5ML (10%) PO SUSR  Authorization Effective Date: 1/1/2023  Authorization Expiration Date: 11/29/2024  Approved Dose/Quantity:   Reference #: [id number]   Insurance Company: [employer] Part D - Phone [phone] Fax [phone]  Expected CoPay: $    CoPay Card Available:      Financial Assistance Needed:   Which Pharmacy is filling the prescription: [facility] [place] [place] - [address] [id number]  Pharmacy Notified: Yes  Patient Notified: Yes

## 2023-11-30 NOTE — CONSULTS
Care Management Initial Consult    General Information  Assessment completed with: Parents, guardian / mom Savannah  Type of CM/SW Visit: Initial Assessment  Primary Care Provider verified and updated as needed: Yes (Elsa Queen [phone])   Readmission within the last 30 days: current reason for admission unrelated to previous admission   Return Category: New Diagnosis  Reason for Consult: discharge planning  Advance Care Planning:    Legal Guardianship 1/6/14 on file in EPIC    Communication Assessment  Patient's communication style: spoken language (English or Bilingual)      Cognitive  Cognitive/Neuro/Behavioral: .WDL except  Level of Consciousness: alert  Arousal Level: opens eyes spontaneously  Orientation: other (see comments) (HECTOR)  Mood/Behavior: cooperative     Speech: unable to speak    Living Environment:   People in home: parent(s)  Savannah  Current living Arrangements: house      Able to return to prior arrangements: yes     Family/Social Support:  Care provided by: homecare agency (and mom)  Provides care for: no one, unable/limited ability to care for self  Marital Status: Single  Description of Support System:    supportive, involved     Current Resources:   Patient receiving home care services: Yes (via [facility])  Skilled Home Care Services: Skilled Nursing, Physical Therapy  Community Resources: PCA (overnight)  Equipment currently used at home:    Supplies currently used at home: Incontinence Supplies, Enteral Nutrition & Supplies ([employer]--suction machine 10/2023 and [facility]--new bed 11/2023)    Employment/Financial:  Employment Status: disabled     Financial Concerns: none   Referral to Financial Worker: No  Finance Comments: active MEDICARE[place]IC[place] and [place]  Does the patient's insurance plan have a 3 day qualifying hospital stay waiver?  No    Lifestyle & Psychosocial Needs:  Social Determinants of Health     Food Insecurity: Not  "on file   Depression: Not at risk (9/20/2023)    PHQ-2     PHQ-2 Score: 0   Housing Stability: Not on file   Tobacco Use: Medium Risk (11/7/2023)     Functional Status:  Prior to admission patient needed assistance:   Dependent ADLs:: Wheelchair-with assist, Transfers, Positioning, Incontinence, Grooming, Eating, Dressing, Bathing, Toileting  Dependent IADLs:: Cleaning, Cooking, Laundry, Shopping, Meal Preparation, Medication Management, Money Management, Transportation, Incontinence    Mental Health Status:  Mental Health Status: No Current Concerns       Chemical Dependency Status:  Chemical Dependency Status: No Current Concerns       Values/Beliefs:  Spiritual, Cultural Beliefs, Mu-ism Practices, Values that affect care: no             Additional Information:  Pt admitted with \"Likely early sepsis from recurrent aspiration pneumonia\" and recent COVID.  At baseline lives with his mom who is his legal guardian.  Spoke with pt mom/guardian Savannah via phone [phone] and confirmed the above, discussed services/DME in progress:   - has PCA assistance overnight (mom is the PCA during daytime hours)  -  skilled home health care RN & PT from [facility]  - tube feedings from [facility] bed in progress from [facility] (Savannah states she has spoken with the [employer] company, they need her to come in to the store; she thinks she can only go on sat/sun/mon/tues when the laundry lady comes)  - new suction machine from [facility] (Savannah states \"I wish we had thin tube that you can go down the throat a little bit, it didn't come with that\").  - nebulizer machine--has one but no nebulizer solutions    PCP is Dr. Queen at [facility], they prefer virtual visits; she states home care typically will not draw labs & they have to bring him in.  Pt also has upcoming virtual Endocrine appointment scheduled 12/13/23 at 1pm.       CM team to continue to follow for discharge " planning.       Brooke Leon RN, BSN, PHN  [facility]  Inpatient Care Management - FLOAT  [facility] Mobile: [phone] daily 7:30-4:00

## 2023-11-30 NOTE — PROGRESS NOTES
[facility]    Patient is currently receiving services with [facility]. The patient is currently receiving RN/PT services.  Patient's  and home health team have been notified that patient is under inpatient status [facility] Liaison will continue to follow patient during stay. Please provide orders to resume home care at time of discharge if appropriate.

## 2023-11-30 NOTE — PHARMACY-ADMISSION MEDICATION HISTORY
Pharmacist Admission Medication History    Admission medication history is complete. The information provided in this note is only as accurate as the sources available at the time of the update.    Information Source(s): Family member (mom) via phone    Pertinent Information:     Changes made to PTA medication list:  Added: None  Deleted: None  Changed: hydrocortisone tablet evening dose from 5 mg to 7.5 mg    Allergies reviewed with patient and updates made in EHR: no    Medication History Completed By: Akil Aliheyder, [facility] 11/29/2023 6:05 PM    PTA Med List   Medication Sig Last Dose    acetaminophen (TYLENOL) 325 MG tablet Take 650 mg by mouth every 6 hours as needed for mild pain     bacitracin 500 UNIT/GM external ointment Apply topically daily as needed for wound care To PEG site. prn    Brivaracetam (BRIVIACT) 10 MG/ML solution 100 mg by Oral or Feeding Tube route 2 times daily 0900, 2100 11/29/2023 at am    carBAMazepine (TEGRETOL) 100 MG/5ML suspension 7.5 mLs (150 mg) by Oral or Feeding Tube route 3 times daily At 06:00, 12:00, and 24:00 for seizures 11/29/2023 at 1200    carBAMazepine (TEGRETOL) 100 MG/5ML suspension 100 mg by Per Feeding Tube route daily Take at 1800 11/28/2023    COMPOUNDED NON-CONTROLLED SUBSTANCE (CMPD RX) - PHARMACY TO MIX COMPOUNDED MEDICATION Scopolamine 0.4mg capsules - take  2 capsule by feeding tube three times daily as needed prn    Cyanocobalamin (VITAMIN B-12 PO) 2,500 mcg by Per Feeding Tube route daily 11/29/2023    glycopyrrolate (ROBINUL) 1 MG tablet Take 1 tablet (1 mg) by mouth 2 times daily as needed (secretions) 11/29/2023 at am    guaiFENesin (MUCINEX) 600 MG 12 hr tablet Take 1 tablet (600 mg) by mouth 2 times daily as needed for congestion 11/29/2023 at am    hydrocortisone (CORTAID) 1 % external cream Apply topically 2 times daily as needed Apply to reddened memo areas as needed prn    hydrocortisone (CORTEF) 5 MG tablet Take 15 mg (3 tablets) in the morning and  7.5 mg (1.5 tablet)  at 2:00 PM. During illness patient takes more as a stress dose. Please increase the dose as directed. (Patient taking differently: Take 15 mg (3 tablets) in the morning and 7.5 mg (1 tablet)  at 6:00 PM. Per feeding tube. During illness patient takes more as a stress dose. Mother reports doubling each dose for fever) 11/29/2023 at am    levothyroxine (SYNTHROID/LEVOTHROID) 125 MCG tablet Take 1 tablet (125 mcg) by mouth daily 11/29/2023    metoclopramide (REGLAN) 10 MG/10ML SOLN solution Take 10 mLs (10 mg) by mouth 4 times daily (before meals and nightly) 0800, 1200, 1600, 2000 Disconnects bag before administration, then waits 45 mins before reconnecting after giving the medication 11/29/2023 at am    miconazole (MICATIN) 2 % external powder Apply topically 2 times daily (Patient taking differently: Apply topically 2 times daily as needed) prn    multivitamin, therapeutic (THERA-VIT) TABS tablet 1 tablet by Per Feeding Tube route daily 11/29/2023    mupirocin (BACTROBAN) 2 % external ointment APPLY TOPICALLY TO THE AFFECTED AREA TWICE DAILY AS NEEDED prn    pantoprazole (PROTONIX) 2 mg/mL SUSP suspension TAKE 20ML PER FEEDING TUBE DAILY 11/29/2023 at am    sodium bicarbonate 650 MG tablet TAKE 1 TABLET(650 MG) BY MOUTH TWICE DAILY (Patient taking differently: 650 mg by Per Feeding Tube route 2 times daily) 11/28/2023    testosterone cypionate (DEPOTESTOSTERONE) 200 MG/ML injection Inject 0.25 mLs (50 mg) into the muscle once a week Past Week    vitamin C (ASCORBIC ACID) 1000 MG TABS 1,000 mg by Oral or Feeding Tube route daily  11/29/2023    vitamin D3 (CHOLECALCIFEROL) 2000 units (50 mcg) tablet 2,000 Units by Per Feeding Tube route daily 11/29/2023

## 2023-11-30 NOTE — PHARMACY-VANCOMYCIN DOSING SERVICE
"Pharmacy Vancomycin Initial Note  Date of Service 2023  Patient's  1960  63 year old, male    Indication: Aspiration Pneumonia and Sepsis    Current estimated CrCl = Estimated Creatinine Clearance: 72 mL/min (based on SCr of 1.14 mg/dL).    Creatinine for last 3 days  2023:  3:48 PM Creatinine 0.94 mg/dL  2023:  3:10 PM Creatinine 1.14 mg/dL    Recent Vancomycin Level(s) for last 3 days  No results found for requested labs within last 3 days.      Vancomycin IV Administrations (past 72 hours)                     vancomycin (VANCOCIN) 1,750 mg in 0.9% NaCl 500 mL intermittent infusion (mg) 1,750 mg Given 23 2240                    Nephrotoxins and other renal medications (From now, onward)      Start     Dose/Rate Route Frequency Ordered Stop    23 1000  vancomycin (VANCOCIN) 750 mg in sodium chloride 0.9 % 250 mL intermittent infusion         750 mg  over 60 Minutes Intravenous EVERY 12 HOURS 23 0809      23 2250  piperacillin-tazobactam (ZOSYN) 3.375 g vial to attach to  mL bag        Note to Pharmacy: For [facility], [facility] and [facility]: For Zosyn-naive patients, use the \"Zosyn initial dose + extended infusion\" order panel.    3.375 g  over 30 Minutes Intravenous EVERY 6 HOURS 23 2245              Contrast Orders - past 72 hours (72h ago, onward)      None            InsightRX Prediction of Planned Initial Vancomycin Regimen  Regimen: 750 mg IV every 12 hours. (Has already received 1750mg loading dose in ED last night)  Start time: 10:00 on 2023  Exposure target: AUC24 (range)400-600 mg/L.hr   AUC24,ss: 492 mg/L.hr  Probability of AUC24 > 400: 72 %  Ctrough,ss: 16.4 mg/L  Probability of Ctrough,ss > 20: 32 %  Probability of nephrotoxicity (Lodise ERNESTO ): 12 %          Plan:  Start vancomycin  750 mg IV q12h.   Vancomycin monitoring method: AUC  Vancomycin therapeutic monitoring goal: 400-600 mg*h/L  Pharmacy will check vancomycin levels as appropriate " in 1-3 Days.    Serum creatinine levels will be ordered daily for the first week of therapy and at least twice weekly for subsequent weeks.    Also discussed orders for zosyn & levaquin with Dr. Schmitt. He would like to use zosyn for now and will consider continuing levaquin as well. Had dose of levaquin yesterday.    Elen Walter, [facility]

## 2023-11-30 NOTE — PROVIDER NOTIFICATION
DATE/TIME OF CALL RECEIVED FROM LAB:  11/30/23 at 2:30 PM   LAB TEST:  Blood Cultures   LAB VALUE:  Preliminary Staph Species  PROVIDER NOTIFIED?: Yes  PROVIDER NAME: Abhi Schmitt  DATE/TIME LAB VALUE REPORTED TO PROVIDER: 11/30 2:32  MECHANISM OF PROVIDER NOTIFICATION: Page  PROVIDER RESPONSE: Acknowledged and says patient is on Vancomycin

## 2023-11-30 NOTE — CONSULTS
CLINICAL NUTRITION SERVICES  -  ASSESSMENT NOTE    RECOMMENDATIONS FOR MD/PROVIDER TO ORDER: Please advise FWF is decreased from previous EN regimen as IVF are currently running at 75 ml/hr   Recommendations Ordered by Registered Dietitian (RD):   Ordered pt home TF regimen, FWF w/ labs, and a wt's     Nutrition Support Enteral:  Type of Feeding Tube: Gastrojejunostomy   Enteral Frequency:  Continuous   Enteral Regimen: Jevity 1.5 @ 50 ml/hr x 22 hrs (hold for 1 hour before/after levothyroxine dose)  Total Enteral Provisions: 1100ml/day, 1650 kcals, 70 g PRO, 836 ml free H20, 238 g CHO, and 23 g fiber daily.  Free Water Flush: 60 ml q 4 hours   Hold TF for 1 hour prior to and post levothyroxine.   --> Initiate TF @ 20 ml/hr for 24 hours, advance by 10 ml q 12 hours until reach goal, pending tolerance and labs   Malnutrition: Unable to determine due to no nutrition Hx and NFPE, historically doesn't meeting criteria.     REASON FOR ASSESSMENT  Keyon Farias is a 63 year old male seen by Registered Dietitian for Provider Order - Registered Dietitian to Assess and Order TF per Medical Nutrition Therapy Protocol    PMH of: TBI with aphasia, R sided spastic hemiplegia, chronic hemiplegia and chronic dysphagia with GJ-tube for TFs/meds, seizure disorder ,  panhypopituitarism and hx of prior hospitalizations for recurrent pneumonia.      Recently he was admitted in  8/2023, then 9/2023 at which time he was noted with septic shock from pneumonia and required intubation that hospital stay. He then got admitted again 10/25/23, treated for COVID pneumonia with Remdesivir and dexamethasone. He then has had three ED visits in past four days (on 11/26, 11/28 and then 11/29) for bronchitis and has not been able to get ciprofloxacin as prescribed. His mother notes that he has been very somnolent, was noted hypotensive to SBP 80s which responded to increased fluids from feeding tube. He was also noted with low grade  fever.    NUTRITION HISTORY  - Unable to obtain nutrition history as pt on covid precautions and has Hx of asaphia  - Information obtained from chart review   - Per Chart review, pt is well known to [facility] services and has been assess various times since 2016, last being 10/26/23 and 10/27/23, with the following formula - Jevity 1.5 @ 50 mL/hr x 22 hours to provide = 1100 mL, 1650 kcal (24 kcal/kg), 70 g pro (1 g/kg), 238 g CHO, 23 g fiber, 836 mL free water.   Flush 100 q 4 hours.   Hold TF for 1 hour prior to and post levothyroxine.   NeutraPhos TID (to be ordered at discharge)   - Called pt's mother, Savannah and she mentioned the fun 5.5 cans of Jevity @ 72 ml/hr x 12 hrs. She thinks sometimes his stomach fills up too fast and the quick rate might push through undigested food, so she'll turn it off for 30 minutes. She flushes with 60 ml with meds and before/after TF regimen, a total of 6 times a day (360 ml)    CURRENT NUTRITION ORDERS  Diet Order:   NPO     Current Intake/Tolerance: NPO    NUTRITION FOCUSED PHYSICAL ASSESSMENT FOR DIAGNOSING MALNUTRITION)  No    Observed:    Unable to assess    ANTHROPOMETRICS  Height: 6'  Weight: 165 lbs (74.8 kg)  Body Mass Index: 22.38 kg/m2  Weight Status:  Normal BMI  IBW: 178 lb (80.9 kg)  % IBW: 92%  Weight History: suspect 11/26 self reported wt as 10/26 shows 70.8 kg  Wt Readings from Last 10 Encounters:   11/26/23 74.8 kg (165 lb)   11/07/23 74.8 kg (165 lb)   10/26/23 70.8 kg (156 lb 1.4 oz)   10/04/23 83.9 kg (185 lb)   10/03/23 84.2 kg (185 lb 10 oz)   09/21/23 77.1 kg (170 lb)   09/12/23 76.8 kg (169 lb 5 oz)   08/12/23 78.4 kg (172 lb 13.5 oz)   05/10/23 72.6 kg (160 lb)   04/06/23 79.4 kg (175 lb)      LABS  A1c 5.9 (H), ,     MEDICATIONS  Levothyroxine, IVF @ 75 ml/hr (1800 ml/day)    ASSESSED NUTRITION NEEDS PER APPROVED PRACTICE GUIDELINES:  Dosing Weight 74.8 kg (actual)     Estimated Energy Needs: 1481-4647 kcals (20-25 Kcal/Kg)   Justification:  maintenance  Estimated Protein Needs: 60-75 grams protein (0.8-1 g pro/Kg)  Justification: maintenance  Estimated Fluid Needs: 4971-9932  mL (1 mL/Kcal)  Justification: per provider pending fluid status    MALNUTRITION:  % Weight Loss:  None noted  % Intake:  No decreased intake noted, pt on TF at baseline  Subcutaneous Fat Loss:  Unable to assess, covid+  Muscle Loss:  Unable to assess, covid+  Fluid Retention:  None noted    Malnutrition Diagnosis: Unable to determine due to no NFPE, historically doesn't meeting criteria.     NUTRITION DIAGNOSIS:  Inadequate oral intake related to NPO status as evidenced by need for EN support to meet pt nutrition needs.    NUTRITION INTERVENTIONS  Recommendations / Nutrition Prescription  Ordered pt home TF regimen, FWF w/ labs, and a wt's     Nutrition Support Enteral:  Type of Feeding Tube: Gastrojejunostomy   Enteral Frequency:  Continuous   Enteral Regimen: Jevity 1.5 @ 50 ml/hr x 22 hrs (hold for 1 hour before/after levothyroxine dose)  Total Enteral Provisions: 1100ml/day, 1650 kcals, 70 g PRO, 836 ml free H20, 238 g CHO, and 23 g fiber daily.  Hold TF for 1 hour prior to and post levothyroxine.   Free Water Flush: 60 ml q 4 hours   --> Initiate TF @ 120 ml/hr for 24 hours, advance by 10 ml q 12 hours until reach goal, pending tolerance and labs    Implementation  Nutrition education: Per Provider order if indicated   EN Composition, EN Schedule, Feeding Tube Flush, and weight    Nutrition Goals  TF to reach goal within 48 hrs   TF to provide % of estimated nutritional needs per DW 74.8 kg, a minimum of 20 kcal/kg and 0.8 g/k protein.    MONITORING AND EVALUATION:  Progress towards goals will be monitored and evaluated per protocol and Practice Guidelines    Evangelist Aguilera RD, LD

## 2023-11-30 NOTE — PROVIDER NOTIFICATION
DATE/TIME OF CALL RECEIVED FROM LAB:  11/30/23 at 11:50 AM   LAB TEST:  Blood cultures  LAB VALUE:  Growing gram positive cocci in clusters  PROVIDER NOTIFIED?: Yes  PROVIDER NAME: Dr. Schmitt  DATE/TIME LAB VALUE REPORTED TO PROVIDER: 11:48am  MECHANISM OF PROVIDER NOTIFICATION: Page  PROVIDER RESPONSE: Will address with bedside RN.

## 2023-11-30 NOTE — PROGRESS NOTES
RECEIVING UNIT ED HANDOFF REVIEW    ED Nurse Handoff Report was reviewed by: Keyana Mcelroy RN on November 29, 2023 at 11:10 PM

## 2023-11-30 NOTE — PLAN OF CARE
Goal Outcome Evaluation:         Pt here with sepsis, blood cultures +, encephalopathy, MRSA precautions. Unable to assess orientation due to lack of speech (baseline, history of TBI), makes sounds, R side hemiplegia, RUE 1/5, LUE 3/5, BLE's 2/5, chronic dysphagia. VSS ex. Hypotensive, on NaCl 0.9% infusing at 75 mL/hr. NPO diet, Takes pills crushed through PEG. TF infusing at 20 mL/hr starting at 1300 for 24 hrs, then advance by 10 mL Q12 hrs unti goal of 50 mL/hr, 60 mL free water Q4hrs programmed in the pump. Up with A2 lift, turn and repo Q2 hrs. No signs of pain. Rash-like on R arm, pt scratches his R arm frequently, hospitalist notified.  3 BM's this AM shift, incontinent of bowel and bladder, ext. Cath. In place. Pt scoring green on the Aggression Stop Light Tool. Plan to continue antibiotic treatment and consults. Discharge pending.

## 2023-12-01 LAB
ANION GAP SERPL CALCULATED.3IONS-SCNC: 11 MMOL/L (ref 7–15)
BACTERIA BLD CULT: NO GROWTH
BACTERIA BLD CULT: NO GROWTH
BUN SERPL-MCNC: 8.4 MG/DL (ref 8–23)
CALCIUM SERPL-MCNC: 8 MG/DL (ref 8.8–10.2)
CHLORIDE SERPL-SCNC: 111 MMOL/L (ref 98–107)
CORTIS SERPL-MCNC: 3.9 UG/DL
CREAT SERPL-MCNC: 0.79 MG/DL (ref 0.67–1.17)
DEPRECATED HCO3 PLAS-SCNC: 22 MMOL/L (ref 22–29)
EGFRCR SERPLBLD CKD-EPI 2021: >90 ML/MIN/1.73M2
ERYTHROCYTE [DISTWIDTH] IN BLOOD BY AUTOMATED COUNT: 14.4 % (ref 10–15)
GLUCOSE BLDC GLUCOMTR-MCNC: 127 MG/DL (ref 70–99)
GLUCOSE BLDC GLUCOMTR-MCNC: 132 MG/DL (ref 70–99)
GLUCOSE SERPL-MCNC: 122 MG/DL (ref 70–99)
HCT VFR BLD AUTO: 36.9 % (ref 40–53)
HGB BLD-MCNC: 11.8 G/DL (ref 13.3–17.7)
MAGNESIUM SERPL-MCNC: 2.4 MG/DL (ref 1.7–2.3)
MCH RBC QN AUTO: 29.7 PG (ref 26.5–33)
MCHC RBC AUTO-ENTMCNC: 32 G/DL (ref 31.5–36.5)
MCV RBC AUTO: 93 FL (ref 78–100)
PHOSPHATE SERPL-MCNC: 1.9 MG/DL (ref 2.5–4.5)
PLATELET # BLD AUTO: 147 10E3/UL (ref 150–450)
POTASSIUM SERPL-SCNC: 3.6 MMOL/L (ref 3.4–5.3)
RBC # BLD AUTO: 3.97 10E6/UL (ref 4.4–5.9)
SODIUM SERPL-SCNC: 144 MMOL/L (ref 135–145)
VANCOMYCIN SERPL-MCNC: 14.6 UG/ML
WBC # BLD AUTO: 8.6 10E3/UL (ref 4–11)

## 2023-12-01 PROCEDURE — 120N000001 HC R&B MED SURG/OB

## 2023-12-01 PROCEDURE — 36415 COLL VENOUS BLD VENIPUNCTURE: CPT | Performed by: HOSPITALIST

## 2023-12-01 PROCEDURE — 250N000013 HC RX MED GY IP 250 OP 250 PS 637: Performed by: HOSPITALIST

## 2023-12-01 PROCEDURE — 80048 BASIC METABOLIC PNL TOTAL CA: CPT | Performed by: HOSPITALIST

## 2023-12-01 PROCEDURE — 258N000003 HC RX IP 258 OP 636: Performed by: HOSPITALIST

## 2023-12-01 PROCEDURE — 80202 ASSAY OF VANCOMYCIN: CPT | Performed by: HOSPITALIST

## 2023-12-01 PROCEDURE — 99232 SBSQ HOSP IP/OBS MODERATE 35: CPT | Performed by: HOSPITALIST

## 2023-12-01 PROCEDURE — 84100 ASSAY OF PHOSPHORUS: CPT | Performed by: HOSPITALIST

## 2023-12-01 PROCEDURE — 999N000128 HC STATISTIC PERIPHERAL IV START W/O US GUIDANCE

## 2023-12-01 PROCEDURE — 85027 COMPLETE CBC AUTOMATED: CPT | Performed by: HOSPITALIST

## 2023-12-01 PROCEDURE — 250N000011 HC RX IP 250 OP 636: Mod: JZ | Performed by: HOSPITALIST

## 2023-12-01 PROCEDURE — 83735 ASSAY OF MAGNESIUM: CPT | Performed by: HOSPITALIST

## 2023-12-01 RX ORDER — VANCOMYCIN HYDROCHLORIDE 1 G/200ML
1000 INJECTION, SOLUTION INTRAVENOUS EVERY 12 HOURS
Status: DISCONTINUED | OUTPATIENT
Start: 2023-12-01 | End: 2023-12-01

## 2023-12-01 RX ORDER — ENOXAPARIN SODIUM 100 MG/ML
40 INJECTION SUBCUTANEOUS EVERY 24 HOURS
Status: DISCONTINUED | OUTPATIENT
Start: 2023-12-01 | End: 2023-12-08 | Stop reason: HOSPADM

## 2023-12-01 RX ADMIN — CARBAMAZEPINE 150 MG: 100 SUSPENSION ORAL at 06:42

## 2023-12-01 RX ADMIN — Medication 40 MG: at 06:42

## 2023-12-01 RX ADMIN — HYDROCORTISONE SODIUM SUCCINATE 50 MG: 100 INJECTION, POWDER, FOR SOLUTION INTRAMUSCULAR; INTRAVENOUS at 02:20

## 2023-12-01 RX ADMIN — METOCLOPRAMIDE HYDROCHLORIDE 10 MG: 5 SOLUTION ORAL at 17:14

## 2023-12-01 RX ADMIN — POTASSIUM & SODIUM PHOSPHATES POWDER PACK 280-160-250 MG 2 PACKET: 280-160-250 PACK at 20:33

## 2023-12-01 RX ADMIN — CARBAMAZEPINE 150 MG: 100 SUSPENSION ORAL at 00:14

## 2023-12-01 RX ADMIN — BRIVARACETAM 100 MG: 10 SOLUTION ORAL at 09:19

## 2023-12-01 RX ADMIN — SODIUM CHLORIDE: 9 INJECTION, SOLUTION INTRAVENOUS at 02:21

## 2023-12-01 RX ADMIN — METOCLOPRAMIDE HYDROCHLORIDE 10 MG: 5 SOLUTION ORAL at 09:19

## 2023-12-01 RX ADMIN — CARBAMAZEPINE 100 MG: 100 SUSPENSION ORAL at 17:36

## 2023-12-01 RX ADMIN — SODIUM BICARBONATE 650 MG TABLET 650 MG: at 20:33

## 2023-12-01 RX ADMIN — POTASSIUM & SODIUM PHOSPHATES POWDER PACK 280-160-250 MG 2 PACKET: 280-160-250 PACK at 17:32

## 2023-12-01 RX ADMIN — SODIUM BICARBONATE 650 MG TABLET 650 MG: at 09:20

## 2023-12-01 RX ADMIN — HYDROCORTISONE 7.5 MG: 5 TABLET ORAL at 17:36

## 2023-12-01 RX ADMIN — BRIVARACETAM 100 MG: 10 SOLUTION ORAL at 20:33

## 2023-12-01 RX ADMIN — METOCLOPRAMIDE HYDROCHLORIDE 10 MG: 5 SOLUTION ORAL at 20:33

## 2023-12-01 RX ADMIN — HYDROCORTISONE SODIUM SUCCINATE 50 MG: 100 INJECTION, POWDER, FOR SOLUTION INTRAMUSCULAR; INTRAVENOUS at 14:52

## 2023-12-01 RX ADMIN — METOCLOPRAMIDE HYDROCHLORIDE 10 MG: 5 SOLUTION ORAL at 12:19

## 2023-12-01 RX ADMIN — LEVOFLOXACIN 750 MG: 5 INJECTION, SOLUTION INTRAVENOUS at 17:19

## 2023-12-01 RX ADMIN — CARBAMAZEPINE 150 MG: 100 SUSPENSION ORAL at 12:20

## 2023-12-01 RX ADMIN — ENOXAPARIN SODIUM 40 MG: 40 INJECTION SUBCUTANEOUS at 14:52

## 2023-12-01 RX ADMIN — LEVOTHYROXINE SODIUM 125 MCG: 100 TABLET ORAL at 06:42

## 2023-12-01 RX ADMIN — HYDROCORTISONE SODIUM SUCCINATE 50 MG: 100 INJECTION, POWDER, FOR SOLUTION INTRAMUSCULAR; INTRAVENOUS at 09:28

## 2023-12-01 ASSESSMENT — ACTIVITIES OF DAILY LIVING (ADL)
ADLS_ACUITY_SCORE: 59
ADLS_ACUITY_SCORE: 61
ADLS_ACUITY_SCORE: 59
ADLS_ACUITY_SCORE: 61
ADLS_ACUITY_SCORE: 59
ADLS_ACUITY_SCORE: 61

## 2023-12-01 NOTE — PLAN OF CARE
Goal Outcome Evaluation:         Pt here with sepsis, blood cultures +, encephalopathy, MRSA precautions. Unable to assess orientation due to lack of speech (baseline, history of TBI), makes sounds, R side hemiplegia, RUE 1/5, LUE 3/5, BLE's 2/5, chronic dysphagia. VSS ex. Hypotensive. NPO diet, Takes pills crushed through PEG. TF infusing at 30 mL/hr, advance by 10 mL Q12 hrs ( due at 1 am) unti goal of 50 mL/hr, 60 mL free water Q4hrs programmed in the pump. Up with A2 lift, turn and repo Q2 hrs. No signs of pain. Rash-like on R arm, pt scratches his R arm frequently. Incontinent of bowel and bladder, ext. Cath. In place. Pt scoring green on the Aggression Stop Light Tool. IV line came out before Vanco IV administration, Float resource nurse unable to place another IV line, Vascular consult placed. Pt will need his Vancomycin and Cortisone IV med ASAP when IV access is restore. Plan to continue antibiotic treatment and consults. Discharge pending.

## 2023-12-01 NOTE — PHARMACY-VANCOMYCIN DOSING SERVICE
Pharmacy Vancomycin Note  Date of Service 2023  Patient's  1960   63 year old, male    Indication: Aspiration Pneumonia and Sepsis  Day of Therapy: 3  Current vancomycin regimen:  750 mg IV q12h  Current vancomycin monitoring method: AUC  Current vancomycin therapeutic monitoring goal: 400-600 mg*h/L    InsightRX Prediction of Current Vancomycin Regimen  Loading dose: N/A  Regimen: 750 mg IV every 12 hours.  Start time: 10:58 on 2023  Exposure target: AUC24 (range)400-600 mg/L.hr   AUC24,ss: 393 mg/L.hr  Probability of AUC24 > 400: 47 %  Ctrough,ss: 12.1 mg/L  Probability of Ctrough,ss > 20: 4 %  Probability of nephrotoxicity (Lodise ERNESTO ): 7 %    Current estimated CrCl = Estimated Creatinine Clearance: 96.9 mL/min (based on SCr of 0.79 mg/dL).    Creatinine for last 3 days  2023:  3:48 PM Creatinine 0.94 mg/dL  2023:  3:10 PM Creatinine 1.14 mg/dL  2023: 10:24 AM Creatinine 0.79 mg/dL    Recent Vancomycin Levels (past 3 days)  2023: 10:24 AM Vancomycin 14.6 ug/mL    Vancomycin IV Administrations (past 72 hours)                     vancomycin (VANCOCIN) 750 mg in sodium chloride 0.9 % 250 mL intermittent infusion (mg) 750 mg New Bag 23 2207     750 mg New Bag  1128    vancomycin (VANCOCIN) 1,750 mg in 0.9% NaCl 500 mL intermittent infusion (mg) 1,750 mg Given 23 2240                    Nephrotoxins and other renal medications (From now, onward)      Start     Dose/Rate Route Frequency Ordered Stop    23 1130  vancomycin (VANCOCIN) 1,000 mg in 200 mL dextrose intermittent infusion         1,000 mg  200 mL/hr over 1 Hours Intravenous EVERY 12 HOURS 23 1059                 Contrast Orders - past 72 hours (72h ago, onward)      None            Interpretation of levels and current regimen:  Vancomycin level is reflective of AUC less than 400    Has serum creatinine changed greater than 50% in last 72 hours: No    Urine output:  unable to  determine    Renal Function: Stable    InsightRX Prediction of Planned New Vancomycin Regimen  Loading dose: N/A  Regimen: 1000 mg IV every 12 hours.  Start time: 10:58 on 12/01/2023  Exposure target: AUC24 (range)400-600 mg/L.hr   AUC24,ss: 520 mg/L.hr  Probability of AUC24 > 400: 91 %  Ctrough,ss: 16 mg/L  Probability of Ctrough,ss > 20: 22 %  Probability of nephrotoxicity (Lodise ERNESTO 2009): 11 %    Plan:  Increase Dose to 1000mg IV q12h  Vancomycin monitoring method: AUC  Vancomycin therapeutic monitoring goal: 400-600 mg*h/L  Pharmacy will check vancomycin levels as appropriate in 1-3 Days.  Serum creatinine levels will be ordered every 48 hours.    Justin MurphyD

## 2023-12-01 NOTE — PROGRESS NOTES
[facility]    Medicine Progress Note - Hospitalist Service    Date of Admission:  11/29/2023    Assessment & Plan      nilda Farias is a 63 year old male with PMH significant for Hx of TBI with aphasia, R sided spastic hemiplegia, chronic hemiplegia and chronic dysphagia with GJ-tube for TFs/meds, seizure disorder ,  panhypopituitarism and hx of prior hospitalizations for recurrent pneumonia.      Recently he was admitted in  8/2023, then 9/2023 at which time he was noted with septic shock from pneumonia and required intubation that hospital stay. Respiratory cultures grew MR  and Pseudomonas and he was treated with IV vancomycin and Zosyn and then changed to ciprofloxacin. He then got admitted again 10/25/23, treated for COVID pneumonia with Remdesivir and dexamethasone.      He then has had three ED visits in past four days (on 11/26, 11/28 and then 11/29) for bronchitis and has not been able to get ciprofloxacin as prescribed. His mother notes that he has been very somnolent, was noted hypotensive to SBP 80s which responded to increased fluids from feeding tube. He was also noted with low grade fever.     Likely early sepsis   Positive blood culture with gram-positive cocci in cluster  Somnolence, metabolic encephalopathy likely secondary to above-i resolved  Recent h/o COVID (10/25/2023), likely COVID recovered  recent hospitalization for septic shock from pneumonia requiring intubation (9/2023)  -Has hx of several prior hospitalizations for aspiration pneumonitis with recent hospitalizations and initial presentation as noted above  -On admission presented with tachycardia, lactic acid was 1.8, WBC count was 13.9 with hemoglobin of 14.3 and platelet count of 218  -His COVID-19 has been persistently positive on admission and he tested negative for influenza and RSV  -Chest x-ray done on 11/29 showed slightly worsening of the right basilar opacity likely due to worsening infection or  atelectasis, left lung is clear and elevated right hemidiaphragm with no evidence of pleural effusion or pneumothorax  -Patient did receive vancomycin and levofloxacin in the ED and he was started on normal saline at 75 mill per hour and as needed Tylenol.  -It was determined on admission that patient is recovered-from COVID-19 and does not need any further therapeutics for COVID  -last hospitalization when he was discharged on 10/28/2023 and during that hospitalization his respiratory cultures grew MRSA and Pseudomonas and patient was being treated with vancomycin and Zosyn and he was discharged on ciprofloxacin and completed the course  -Blood cultures 1 out of 2 done on 11/29 is positive for staph hominis and staph hemolyticus and likley contaminant and will discontinue vanco  -MRSA swab is positive  -Patient was  continued yesterday on vancomycin and levofloxacin and he was given stress dose steroids  -His WBC count is normal today he has been afebrile, blood pressure is better  -We will DC the IV fluids and continue the patient with Levaquin  -Of note on exam he seems to be back to baseline       Mildly elevated creatinine  -Of note his potassium was within normal limits at 3.4 and creatinine was 1.14 and baseline creatinine is around was 0.8-1.1  -Creatinine today is normal at 0.79 and we will stop IV fluids     Spastic hemiplegia  Traumatic brain injury  Nonverbal  Chronic right-sided paresis  Chronic dysphagia, s/p GJ tube  Clogged J tube  - Bed-bound and essentially nonverbal at baseline. Takes meds via G-tube. 5 cans Jevity/d via Gtube.  - Chronic and stable on home meds, including Tegretol and Brivaracetam and continue the same  -Nutrition team is consulted and he is started on tube feeds and continue with free water replacement at a higher dose than baseline for today     Panhypopituitarism  Hypothyroidism  - Chronic and stable on home meds, including hydrocortisone and Synthroid   -He was given stress  dose steroids for 1 day and we will restart him on his PTA home dose of hydrocortisone and continue with PTA Synthroid     GERD  - Chronic and stable on PPI    Rash/itchiness on the right arm  -As per mom patient has been scratching his arm and she puts moisturizing/drying next cream and on exam does not look infected and will continue with local cares      Hypophosphatemia  -Phosphorus level this morning is 1.9 and we will start him on replacement          Diet: NPO for Medical/Clinical Reasons Except for: No Exceptions  Adult Formula Drip Feeding: Continuous Jevity 1.5; Jejunostomy; Goal Rate: 50; mL/hr; Hold TF for 1 hour prior to and post levothyroxine. Initiate TF @ 20 ml/hr for 24 hours, advance by 10 ml q 12 hours until reach goal, pending tolerance and labs...    DVT Prophylaxis: enoxaparin (Lovenox) SQ  Lerma Catheter: Not present  Lines: None     Cardiac Monitoring: None  Code Status: Full Code      Clinically Significant Risk Factors          # Hypocalcemia: Lowest Ca = 8 mg/dL in last 2 days, will monitor and replace as appropriate                    # Financial/Environmental Concerns: none         Disposition Plan      Expected Discharge Date: 12/02/2023                    Abhi Schmitt MD  Hospitalist Service  [facility]  Securely message with FTAPI Software (more info)  Text page via [employer] Paging/Directory   ______________________________________________________________________    Interval History     Seen this morning and he is alert and oriented and his blood pressure and heart rate are stable and he was able to give me thumbs up and patient is well-known to me from his past admissions and he seems to be at his baseline mental status.    Mom updated    Physical Exam   Vital Signs: Temp: 97.7  F (36.5  C) Temp src: Oral BP: 119/59 Pulse: 79   Resp: 18 SpO2: 99 % O2 Device: None (Room air)    Weight: 153 lbs 14.1 oz    General: He is conscious and is nonverbal and patient seems to be  near baseline and was giving thumbs up  Neck: Supple  Oral cavity: His oral mucosa seems to be dry  Abdomen: Feeding tube is in place with no local sign of infection  Respiratory: He is on room air and no crackles on exam  CVS: S1-S2 normal  Musculoskeletal: Right hemiplegia    Medical Decision Making       Time spent in care of patient is 44 minutes and I reviewed his labs, medications and discussed plan of care in detail with the patient, nursing staff, mom and pharmacy staff      Data     I have personally reviewed the following data over the past 24 hrs:    8.6  \   11.8 (L)   / 147 (L)     144 111 (H) 8.4 /  122 (H)   3.6 22 0.79 \       Imaging results reviewed over the past 24 hrs:   No results found for this or any previous visit (from the past 24 hour(s)).

## 2023-12-01 NOTE — PLAN OF CARE
Neuro: alert aphasic  Tele/cardiac: N/A  Respiration: on RA  Activity: A2 with lift q2 turn and repo  Pain: denies  Drips: IVF @ 75 ml/hr  Drains/tubes: GJ tube  Skin: excoriation & abrasion BUE Sacram coccyx  GI/: TF started today at 1300 @ 20 ml for 24 hours, then increase by 10 mls 12 hourly until goal rate 50 ml/hr, q4 80ml water flashes, external cath  Aggression color: green  Isolation: contact precautions

## 2023-12-01 NOTE — PLAN OF CARE
Orientation: Alert. Mute. HECTOR orientation.   Does follow commands.     Vitals/Tele: VSS RA.    IV Access/drains: TF at 20ml/hr w/80ml FF q4. Will gradually increase rate starting at 1pm tomorrow.     Diet: NPO    Mobility: A2 lift. RUE/RLE spastic hemiparesis. RUE contracted.     GI/: Incontinent B&B, external catheter in place.     Wound/Skin: BLE abrasions, ANANYA. Blanchable redness to coccyx mepilex in place.     Consults: SW following.     Discharge Plan: Pending progress.       See Flow sheets for assessment

## 2023-12-01 NOTE — PLAN OF CARE
Reason for Admission: Pneumonia, early sepsis     Cognitive/Mentation: HECTOR  Neuros/CMS: Spastic hemiplegia, nonverbal at baseline, chronic R sided paresis  VS: Stable, RA  GI: Incontinent, No BM this shift   : External catheter in place   Pulmonary: LS diminished, infrequent weak, congested cough; respirations regular/non-labored  Pain: No s/s of pain      Drains/Lines: PIV infusing NS @75 mL/hr, IV Vanco and Levaquin  Skin: Scattered bruises and scratches on arms. Scattered scabs. Redness above memo area. Blanchable redness on coccyx  Activity: A2 lift, T/R q2 when in bed  Diet: NPO, chronic dysphagia- GJ tube in place; continuous TF @ 20ml/hr, rate to be increased @ 1300 today.  Discharge: pending medical improvement

## 2023-12-02 LAB
ANION GAP SERPL CALCULATED.3IONS-SCNC: 9 MMOL/L (ref 7–15)
BACTERIA BLD CULT: ABNORMAL
BUN SERPL-MCNC: 8.9 MG/DL (ref 8–23)
CALCIUM SERPL-MCNC: 7.9 MG/DL (ref 8.8–10.2)
CHLORIDE SERPL-SCNC: 113 MMOL/L (ref 98–107)
CREAT SERPL-MCNC: 0.71 MG/DL (ref 0.67–1.17)
DEPRECATED HCO3 PLAS-SCNC: 25 MMOL/L (ref 22–29)
EGFRCR SERPLBLD CKD-EPI 2021: >90 ML/MIN/1.73M2
ERYTHROCYTE [DISTWIDTH] IN BLOOD BY AUTOMATED COUNT: 14.7 % (ref 10–15)
GLUCOSE SERPL-MCNC: 103 MG/DL (ref 70–99)
HCT VFR BLD AUTO: 36.7 % (ref 40–53)
HGB BLD-MCNC: 11.8 G/DL (ref 13.3–17.7)
MCH RBC QN AUTO: 29.6 PG (ref 26.5–33)
MCHC RBC AUTO-ENTMCNC: 32.2 G/DL (ref 31.5–36.5)
MCV RBC AUTO: 92 FL (ref 78–100)
PHOSPHATE SERPL-MCNC: 2.3 MG/DL (ref 2.5–4.5)
PLATELET # BLD AUTO: 125 10E3/UL (ref 150–450)
POTASSIUM SERPL-SCNC: 3.5 MMOL/L (ref 3.4–5.3)
RBC # BLD AUTO: 3.98 10E6/UL (ref 4.4–5.9)
SODIUM SERPL-SCNC: 147 MMOL/L (ref 135–145)
WBC # BLD AUTO: 5.6 10E3/UL (ref 4–11)

## 2023-12-02 PROCEDURE — 250N000011 HC RX IP 250 OP 636: Mod: JZ | Performed by: HOSPITALIST

## 2023-12-02 PROCEDURE — 258N000003 HC RX IP 258 OP 636: Performed by: HOSPITALIST

## 2023-12-02 PROCEDURE — 250N000013 HC RX MED GY IP 250 OP 250 PS 637: Performed by: HOSPITALIST

## 2023-12-02 PROCEDURE — 99232 SBSQ HOSP IP/OBS MODERATE 35: CPT | Performed by: HOSPITALIST

## 2023-12-02 PROCEDURE — 80048 BASIC METABOLIC PNL TOTAL CA: CPT | Performed by: HOSPITALIST

## 2023-12-02 PROCEDURE — 36415 COLL VENOUS BLD VENIPUNCTURE: CPT | Performed by: HOSPITALIST

## 2023-12-02 PROCEDURE — 85014 HEMATOCRIT: CPT | Performed by: HOSPITALIST

## 2023-12-02 PROCEDURE — 120N000001 HC R&B MED SURG/OB

## 2023-12-02 PROCEDURE — 250N000009 HC RX 250: Performed by: HOSPITALIST

## 2023-12-02 PROCEDURE — 84100 ASSAY OF PHOSPHORUS: CPT | Performed by: HOSPITALIST

## 2023-12-02 PROCEDURE — 87040 BLOOD CULTURE FOR BACTERIA: CPT | Performed by: HOSPITALIST

## 2023-12-02 RX ORDER — VANCOMYCIN HYDROCHLORIDE 1 G/200ML
1000 INJECTION, SOLUTION INTRAVENOUS EVERY 12 HOURS
Status: DISCONTINUED | OUTPATIENT
Start: 2023-12-02 | End: 2023-12-04 | Stop reason: DRUGHIGH

## 2023-12-02 RX ADMIN — POTASSIUM PHOSPHATE, MONOBASIC AND POTASSIUM PHOSPHATE, DIBASIC 9 MMOL: 224; 236 INJECTION, SOLUTION, CONCENTRATE INTRAVENOUS at 20:36

## 2023-12-02 RX ADMIN — POTASSIUM & SODIUM PHOSPHATES POWDER PACK 280-160-250 MG 2 PACKET: 280-160-250 PACK at 01:29

## 2023-12-02 RX ADMIN — CARBAMAZEPINE 100 MG: 100 SUSPENSION ORAL at 17:08

## 2023-12-02 RX ADMIN — BRIVARACETAM 100 MG: 10 SOLUTION ORAL at 20:14

## 2023-12-02 RX ADMIN — LEVOFLOXACIN 750 MG: 5 INJECTION, SOLUTION INTRAVENOUS at 15:26

## 2023-12-02 RX ADMIN — LEVOTHYROXINE SODIUM 125 MCG: 100 TABLET ORAL at 07:56

## 2023-12-02 RX ADMIN — CARBAMAZEPINE 150 MG: 100 SUSPENSION ORAL at 11:49

## 2023-12-02 RX ADMIN — CARBAMAZEPINE 150 MG: 100 SUSPENSION ORAL at 01:29

## 2023-12-02 RX ADMIN — METOCLOPRAMIDE HYDROCHLORIDE 10 MG: 5 SOLUTION ORAL at 11:49

## 2023-12-02 RX ADMIN — BRIVARACETAM 100 MG: 10 SOLUTION ORAL at 07:56

## 2023-12-02 RX ADMIN — VANCOMYCIN HYDROCHLORIDE 1000 MG: 1 INJECTION, SOLUTION INTRAVENOUS at 11:49

## 2023-12-02 RX ADMIN — ENOXAPARIN SODIUM 40 MG: 40 INJECTION SUBCUTANEOUS at 15:27

## 2023-12-02 RX ADMIN — HYDROCORTISONE 7.5 MG: 5 TABLET ORAL at 17:07

## 2023-12-02 RX ADMIN — METOCLOPRAMIDE HYDROCHLORIDE 10 MG: 5 SOLUTION ORAL at 20:14

## 2023-12-02 RX ADMIN — CARBAMAZEPINE 150 MG: 100 SUSPENSION ORAL at 06:42

## 2023-12-02 RX ADMIN — Medication 40 MG: at 06:42

## 2023-12-02 RX ADMIN — SODIUM BICARBONATE 650 MG TABLET 650 MG: at 07:55

## 2023-12-02 RX ADMIN — METOCLOPRAMIDE HYDROCHLORIDE 10 MG: 5 SOLUTION ORAL at 07:55

## 2023-12-02 RX ADMIN — HYDROCORTISONE 15 MG: 10 TABLET ORAL at 07:56

## 2023-12-02 RX ADMIN — SODIUM BICARBONATE 650 MG TABLET 650 MG: at 20:14

## 2023-12-02 RX ADMIN — METOCLOPRAMIDE HYDROCHLORIDE 10 MG: 5 SOLUTION ORAL at 15:27

## 2023-12-02 ASSESSMENT — ACTIVITIES OF DAILY LIVING (ADL)
ADLS_ACUITY_SCORE: 59
ADLS_ACUITY_SCORE: 59
ADLS_ACUITY_SCORE: 63
ADLS_ACUITY_SCORE: 63
ADLS_ACUITY_SCORE: 59
ADLS_ACUITY_SCORE: 59
ADLS_ACUITY_SCORE: 63
ADLS_ACUITY_SCORE: 59
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
## 2023-12-02 NOTE — PROGRESS NOTES
[facility]    Medicine Progress Note - Hospitalist Service    Date of Admission:  11/29/2023    Assessment & Plan      nilda Farias is a 63 year old male with PMH significant for Hx of TBI with aphasia, R sided spastic hemiplegia, chronic hemiplegia and chronic dysphagia with GJ-tube for TFs/meds, seizure disorder ,  panhypopituitarism and hx of prior hospitalizations for recurrent pneumonia.      Recently he was admitted in  8/2023, then 9/2023 at which time he was noted with septic shock from pneumonia and required intubation that hospital stay. Respiratory cultures grew MR SA and Pseudomonas and he was treated with IV vancomycin and Zosyn and then changed to ciprofloxacin. He then got admitted again 10/25/23, treated for COVID pneumonia with Remdesivir and dexamethasone.      He then has had three ED visits in past four days (on 11/26, 11/28 and then 11/29) for bronchitis and has not been able to get ciprofloxacin as prescribed by the ED physician. His mother notes that he has been very somnolent, was noted hypotensive to SBP 80s which responded to increased fluids from feeding tube. He was also noted with low grade fever.     Likely early sepsis-resolved likely due to  MRSA pneumonia  Positive blood culture likely  1/2 staph hominis and staph hemolyticus and likley contaminant  Somnolence, metabolic encephalopathy likely secondary to above -fracture  Recent h/o COVID (10/25/2023), likely COVID recovered  recent hospitalization for septic shock from pneumonia requiring intubation (9/2023)  -Has hx of several prior hospitalizations for aspiration pneumonitis with recent hospitalizations and initial presentation as noted above  --last hospitalization when he was discharged on 10/28/2023 and during that hospitalization his respiratory cultures grew MRSA and Pseudomonas and patient was being treated with vancomycin and Zosyn and he was discharged on ciprofloxacin and completed the course  -On  admission presented with tachycardia, lactic acid was 1.8, WBC count was 13.9 with hemoglobin of 14.3 and platelet count of 218  -His COVID-19 has been persistently positive on admission and he tested negative for influenza and RSV  -Chest x-ray done on 11/29 showed slightly worsening of the right basilar opacity likely due to worsening infection or atelectasis, left lung is clear and elevated right hemidiaphragm with no evidence of pleural effusion or pneumothorax  -On admit patient was started on IV fluids, vancomycin and levofloxacin and was being monitored in the hospital and he has always been on room air and his clinical condition has shown significant improvement with improvement in blood pressure.  And IV fluids were discontinued on 12/1 and he did receive stress dose steroids for 1 day  -MRSA swab is positive  -Blood cultures 1/2 done on 11/29 is positive for staph hominis and staph hemolyticus and likley contaminant and vancomycin was discontinued on (12/1 )   - On exam he is less communicative today  - given change in his clinical status with today with him being less communicative I have restarted his vancomycin and infectious disease team has been consulted  -His blood pressure, heart rate, WBC count is stable and initially this morning he was having low temperature but later the temperature was normal  -Blood cultures ordered again  -Appreciate input from infectious disease team and will complete 7 to 10 days course of antibiotics and continue with vancomycin and Levaquin for now and       Mildly elevated creatinine-resolved  -Of note his potassium was within normal limits at 3.4 and creatinine was 1.14 and baseline creatinine is around was 0.8-1.1  -Creatinine today is normal at 0.71     Spastic hemiplegia  Traumatic brain injury  Nonverbal  Chronic right-sided paresis  Chronic dysphagia, s/p GJ tube  Clogged J tube  - Bed-bound and essentially nonverbal at baseline. Takes meds via G-tube. 5 cans  Jevity/d via [employer].  - Chronic and stable on home meds, including Tegretol and Brivaracetam and continue the same  -Nutrition team is consulted and he is started on tube feeds and continue with free water     Mild hypernatremia and mild hyperchloremia  -This can be likely due to recent use of IV fluids and he is already receiving free water at 80 cc every 4 hours and we will increase to 100 cc every 4 hours and check basic tomorrow morning     Panhypopituitarism  Hypothyroidism  - Chronic and stable on home meds, including hydrocortisone and Synthroid   -He was given stress dose steroids for 1 day and continue with PTA home dose of hydrocortisone at 15 mg in the morning and  7.5 in the evening and continue with PTA Synthroid     GERD  - Chronic and stable on PPI    Rash/itchiness on the right arm  -As per mom patient has been scratching his arm and she puts moisturizing/drying next cream and on exam does not look infected and will continue with local cares      Hypophosphatemia  -Phosphorus level this morning is 2.3 and we will continue the patient with replacement    Chronic anemia  -His hemoglobin has been fluctuating and baseline is difficult to assess as patient has been admitted multiple times recently but seems to be around 12 as per review of the EMR  -Hemoglobin on 12/1/2023 was 11.8 and we will continue to monitor and there is no evidence of any bleeding          Diet: NPO for Medical/Clinical Reasons Except for: No Exceptions  Adult Formula Drip Feeding: Continuous Jevity 1.5; Jejunostomy; Goal Rate: 50; mL/hr; Hold TF for 1 hour prior to and post levothyroxine. Initiate TF @ 20 ml/hr for 24 hours, advance by 10 ml q 12 hours until reach goal, pending tolerance and labs...    DVT Prophylaxis: enoxaparin (Lovenox) SQ  Lerma Catheter: Not present  Lines: None     Cardiac Monitoring: None  Code Status: Full Code      Clinically Significant Risk Factors         # Hypernatremia: Highest Na = 147 mmol/L in last  2 days, will monitor as appropriate                     # Financial/Environmental Concerns: none         Disposition Plan      Expected Discharge Date: 12/03/2023                    Abhi Schmitt MD  Hospitalist Service  [facility]  Securely message with [employer] (more info)  Text page via [employer] Paging/Directory     I am off service from tomorrow morning and his care will be taken over by hospital medicine team  ______________________________________________________________________    Interval History     Seen this morning and reviewed his labs including blood cultures and basic metabolic panel.  He continues to be on room air and was much more alert and is more somnolent today.    Discussed plan of care in detail with the patient's nurse Yanira in person and discussed with the mother Savannah today and treatment plan was discussed.    Physical Exam   Vital Signs: Temp: (!) 96.5  F (35.8  C) Temp src: Axillary BP: 122/63 Pulse: 67   Resp: 16 SpO2: 100 % O2 Device: None (Room air)    Weight: 153 lbs 14.1 oz    General: Patient is more somnolent and less communicative than  Abdomen: Feeding tube is in place with no local sign of infection  Respiratory: He is on room air and no crackles on exam  CVS: S1-S2 normal  Musculoskeletal: Right hemiplegia    Medical Decision Making       Time spent in care of patient is 47 minutes and I reviewed his labs basic metabolic panel and blood culture, medications and discussed plan of care in detail with the patient, nursing staff, mom       Data     I have personally reviewed the following data over the past 24 hrs:    8.6  \   11.8 (L)   / 147 (L)     147 (H) 113 (H) 8.9 /  103 (H)   3.5 25 0.71 \       Imaging results reviewed over the past 24 hrs:   No results found for this or any previous visit (from the past 24 hour(s)).

## 2023-12-02 NOTE — PLAN OF CARE
Goal Outcome Evaluation:  12/01/2023 [id number]  Reason for Admission: Recurrent Pneumonia, early sepsis  Hx of  TBI with aphasia, R sided spastic hemiplegia, chronic hemiplegia and chronic dysphagia with GJ-tube for TFs/meds, seizure disorder ,  panhypopituitarism  Cognitive/Mentation: HECTOR,  non-verbal, alert bu t utev  Neuros/CMS: Spastic hemiplegia, nonverbal at baseline, chronic R sided paresis  VS: Stable, RA  GI: Incontinent, 2 large loose BM this shift   : External catheter in place   Pulmonary: LS diminished, infrequent weak, congested cough; respirations regular/non-labored  Pain: No s/s of pain   Drains/Lines: PIV SL  Skin: Scattered bruises and scratches on arms. Scattered scabs. Redness above memo area. Blanchable redness on coccyx with some abrasion to the bottom- mepilex in placed. A pea sized of abrasion to the R groin.   Activity: A2 lift, T/R q2 when in bed  Diet: NPO, chronic dysphagia- GJ tube in place; continuous TF @ 30ml/hr, rate to be increased @ 0100pm today until goal of 50 mL/hr, 80 mL free water Q4hrs programmed in the pump.  - Takes meds via G-tube.   - per nursing notes, vancomycin has not been given yesterday during day shift due to IV issue, now pt has IV but med is now discontinued, MD aware.

## 2023-12-02 NOTE — PHARMACY-VANCOMYCIN DOSING SERVICE
Pharmacy Vancomycin Initial Note  Date of Service 2023  Patient's  1960  63 year old, male    Indication: Community Acquired Pneumonia    Current estimated CrCl = Estimated Creatinine Clearance: 107.9 mL/min (based on SCr of 0.71 mg/dL).    Creatinine for last 3 days  2023:  3:10 PM Creatinine 1.14 mg/dL  2023: 10:24 AM Creatinine 0.79 mg/dL  2023:  5:09 AM Creatinine 0.71 mg/dL    Recent Vancomycin Level(s) for last 3 days  2023: 10:24 AM Vancomycin 14.6 ug/mL      Vancomycin IV Administrations (past 72 hours)                     vancomycin (VANCOCIN) 750 mg in sodium chloride 0.9 % 250 mL intermittent infusion (mg) 750 mg New Bag 23 2207     750 mg New Bag  1128    vancomycin (VANCOCIN) 1,750 mg in 0.9% NaCl 500 mL intermittent infusion (mg) 1,750 mg Given 23 2240                    Nephrotoxins and other renal medications (From now, onward)      Start     Dose/Rate Route Frequency Ordered Stop    23 1100  vancomycin (VANCOCIN) 1,000 mg in 200 mL dextrose intermittent infusion         1,000 mg  200 mL/hr over 1 Hours Intravenous EVERY 12 HOURS 23 1059              Contrast Orders - past 72 hours (72h ago, onward)      None            [employer] Prediction of Planned Initial Vancomycin Regimen  Regimen: 1000 mg IV every 12 hours.  Start time: 10:57 on 2023  Exposure target: AUC24 (range)400-600 mg/L.hr   AUC24,ss: 473 mg/L.hr  Probability of AUC24 > 400: 81 %  Ctrough,ss: 14.2 mg/L  Probability of Ctrough,ss > 20: 11 %  Probability of nephrotoxicity (Lodise ERNESTO ): 9 %          Plan:  Start vancomycin  1000 mg IV q12h.   Vancomycin monitoring method: AUC  Vancomycin therapeutic monitoring goal: 400-600 mg*h/L  Pharmacy will check vancomycin levels as appropriate in 1-3 Days.    Serum creatinine levels will be ordered a minimum of twice weekly.      Gary Singh [facility]

## 2023-12-02 NOTE — PLAN OF CARE
Assessment and plan  Principal hospital problem: sepsis  Coping/psychosocial: calm, cooperative most of the time.  Cognitive: HECTOR, non-verbal  Vital signs: stable on room air.   Cardiovascular: denied chest pain.  Pulmonary: Lung sounds clear and equal bilaterally.  Musculoskeletal: moderately impaired.  Pain: HECTOR, non-verbal signs absent, appears comfortable.  IV Access/drains: IV abx, saline locked.   Wound/Skin: skin is warm, dry, and intact. Mepilex on coccyx.  GI/: tube feeding running at 20 ml/hr. Incontinent of bowel and bladder.  Lerma catheter: not present.  Diet: NPO, tube feed.   Activity: lift assist.  Safety: alarms on, safety rounds completed.    End of shift summary: no acute events this shift. Report given to oncoming shift RN. Discharge pending safe disposition plan.

## 2023-12-02 NOTE — PROVIDER NOTIFICATION
"Paged hospitalist, \"Pt didnt receive vancomycin yesterday during day shift, due to an IV Access issue. Pt now has IV access and vancomycin still in the fridge, but order discontinued.\"    Orders not to give medication due to medication now discontinued.      "

## 2023-12-02 NOTE — PROGRESS NOTES
Pt here with sepsis, recurrent pneumonia, opens eyes spontaneously Is non verbal britt orientation,R sided hemiplegia.  VSS  ex low grade fever occasionally.Sr/Sb on tele, Npo with TF running at 40 ml/hr Q4 100 ml flushes goal rate of 50ml/hr increased by 10ml every 12 hours.  Q2 T/R. Blanchable redness om coccyx's, mepilex in place.  .

## 2023-12-03 LAB
ANION GAP SERPL CALCULATED.3IONS-SCNC: 8 MMOL/L (ref 7–15)
BUN SERPL-MCNC: 10.6 MG/DL (ref 8–23)
CALCIUM SERPL-MCNC: 8 MG/DL (ref 8.8–10.2)
CHLORIDE SERPL-SCNC: 106 MMOL/L (ref 98–107)
CREAT SERPL-MCNC: 0.68 MG/DL (ref 0.67–1.17)
DEPRECATED HCO3 PLAS-SCNC: 27 MMOL/L (ref 22–29)
EGFRCR SERPLBLD CKD-EPI 2021: >90 ML/MIN/1.73M2
GLUCOSE SERPL-MCNC: 95 MG/DL (ref 70–99)
PHOSPHATE SERPL-MCNC: 3 MG/DL (ref 2.5–4.5)
POTASSIUM SERPL-SCNC: 3.7 MMOL/L (ref 3.4–5.3)
SODIUM SERPL-SCNC: 141 MMOL/L (ref 135–145)
VANCOMYCIN SERPL-MCNC: 20.8 UG/ML

## 2023-12-03 PROCEDURE — 250N000011 HC RX IP 250 OP 636: Mod: JZ | Performed by: HOSPITALIST

## 2023-12-03 PROCEDURE — 120N000001 HC R&B MED SURG/OB

## 2023-12-03 PROCEDURE — 84100 ASSAY OF PHOSPHORUS: CPT | Performed by: HOSPITALIST

## 2023-12-03 PROCEDURE — 250N000013 HC RX MED GY IP 250 OP 250 PS 637: Performed by: HOSPITALIST

## 2023-12-03 PROCEDURE — 36415 COLL VENOUS BLD VENIPUNCTURE: CPT | Performed by: HOSPITALIST

## 2023-12-03 PROCEDURE — 80048 BASIC METABOLIC PNL TOTAL CA: CPT | Performed by: HOSPITALIST

## 2023-12-03 PROCEDURE — 80202 ASSAY OF VANCOMYCIN: CPT | Performed by: HOSPITALIST

## 2023-12-03 PROCEDURE — 99232 SBSQ HOSP IP/OBS MODERATE 35: CPT | Performed by: INTERNAL MEDICINE

## 2023-12-03 RX ADMIN — BRIVARACETAM 100 MG: 10 SOLUTION ORAL at 08:05

## 2023-12-03 RX ADMIN — HYDROCORTISONE 7.5 MG: 5 TABLET ORAL at 17:39

## 2023-12-03 RX ADMIN — METOCLOPRAMIDE HYDROCHLORIDE 10 MG: 5 SOLUTION ORAL at 22:27

## 2023-12-03 RX ADMIN — VANCOMYCIN HYDROCHLORIDE 1000 MG: 1 INJECTION, SOLUTION INTRAVENOUS at 00:28

## 2023-12-03 RX ADMIN — SODIUM BICARBONATE 650 MG TABLET 650 MG: at 07:45

## 2023-12-03 RX ADMIN — LEVOTHYROXINE SODIUM 125 MCG: 100 TABLET ORAL at 06:30

## 2023-12-03 RX ADMIN — METOCLOPRAMIDE HYDROCHLORIDE 10 MG: 5 SOLUTION ORAL at 15:15

## 2023-12-03 RX ADMIN — VANCOMYCIN HYDROCHLORIDE 1000 MG: 1 INJECTION, SOLUTION INTRAVENOUS at 11:36

## 2023-12-03 RX ADMIN — SODIUM BICARBONATE 650 MG TABLET 650 MG: at 22:27

## 2023-12-03 RX ADMIN — METOCLOPRAMIDE HYDROCHLORIDE 10 MG: 5 SOLUTION ORAL at 11:36

## 2023-12-03 RX ADMIN — HYDROCORTISONE 15 MG: 10 TABLET ORAL at 07:45

## 2023-12-03 RX ADMIN — Medication 40 MG: at 06:30

## 2023-12-03 RX ADMIN — BRIVARACETAM 100 MG: 10 SOLUTION ORAL at 22:25

## 2023-12-03 RX ADMIN — ENOXAPARIN SODIUM 40 MG: 40 INJECTION SUBCUTANEOUS at 15:15

## 2023-12-03 RX ADMIN — CARBAMAZEPINE 100 MG: 100 SUSPENSION ORAL at 17:39

## 2023-12-03 RX ADMIN — CARBAMAZEPINE 150 MG: 100 SUSPENSION ORAL at 06:30

## 2023-12-03 RX ADMIN — METOCLOPRAMIDE HYDROCHLORIDE 10 MG: 5 SOLUTION ORAL at 07:44

## 2023-12-03 RX ADMIN — CARBAMAZEPINE 150 MG: 100 SUSPENSION ORAL at 11:36

## 2023-12-03 RX ADMIN — LEVOFLOXACIN 750 MG: 5 INJECTION, SOLUTION INTRAVENOUS at 15:15

## 2023-12-03 RX ADMIN — CARBAMAZEPINE 150 MG: 100 SUSPENSION ORAL at 00:28

## 2023-12-03 ASSESSMENT — ACTIVITIES OF DAILY LIVING (ADL)
ADLS_ACUITY_SCORE: 63
ADLS_ACUITY_SCORE: 67
ADLS_ACUITY_SCORE: 63

## 2023-12-03 NOTE — PROVIDER NOTIFICATION
MD Notification    Notified Person: MD    Notified Person Name: Mariaa    Notification Date/Time: 12/2/23 2156    Notification Interaction: AudioName messaging    Purpose of Notification: Blood culture positive: Positive on the 1st day of incubation for Staphylococcus haemolyticus and Staphylococcus hominis Panic. Pt on vanco and levaquin. The blood culture reports says that this bacteria is resistant to levaquin, please address, thank you    Orders Received: will look into, thanks    Comments:

## 2023-12-03 NOTE — SIGNIFICANT EVENT
Significant Event Note    Time of event: 9:56 PM December 2, 2023    Description of event:  I was notified of the following blood culture results:    Blood culture:        Plan:  --Continue Vancomycin.    Discussed with: bedside nurse    Tremayne Leroy MD

## 2023-12-03 NOTE — PLAN OF CARE
[id number]: Alert. Tries to say no or yes, but no sound comes out. Sometimes will nod or shake head in response to questions. Intermittently follows commands. Neuros at baseline. VSS on RA. Tele SR. No signs of pain noted. Peg tube in place, TF running, strict NPO. Incontinent of bowel and bladder. External catheter in place. Turn q2hr. Blanchable redness to coccyx, mepilex in place. Phos replaced per protocol, recheck in am. Up with lift. Contact precautions maintained. Plan to increase TF to goal rate at 0100.

## 2023-12-03 NOTE — PROGRESS NOTES
[facility]  Hospitalist Progress Note  Manish Motta MD  12/03/2023    Assessment & Plan   Keyon Farias is a 63 year old male with PMH significant for Hx of TBI with aphasia, R sided spastic hemiplegia, chronic hemiplegia and chronic dysphagia with GJ-tube for TFs/meds, seizure disorder ,  panhypopituitarism and hx of prior hospitalizations for recurrent pneumonia.      Recently he was admitted in  8/2023, then 9/2023 at which time he was noted with septic shock from pneumonia and required intubation that hospital stay. Respiratory cultures grew MR SA and Pseudomonas and he was treated with IV vancomycin and Zosyn and then changed to ciprofloxacin. He then got admitted again 10/25/23, treated for COVID pneumonia with Remdesivir and dexamethasone.      He then has had three ED visits in past four days (on 11/26, 11/28 and then 11/29) for bronchitis and has not been able to get ciprofloxacin as prescribed by the ED physician. His mother notes that he has been very somnolent, was noted hypotensive to SBP 80s which responded to increased fluids from feeding tube. He was also noted with low grade fever.     Likely early sepsis-resolved likely due to  MRSA pneumonia  Positive blood culture likely  1/2 staph hominis and staph hemolyticus and likley contaminant  Somnolence, metabolic encephalopathy likely secondary to above -fracture  Recent h/o COVID (10/25/2023), likely COVID recovered  recent hospitalization for septic shock from pneumonia requiring intubation (9/2023)  -Has hx of several prior hospitalizations for aspiration pneumonitis with recent hospitalizations and initial presentation as noted above  --last hospitalization when he was discharged on 10/28/2023 and during that hospitalization his respiratory cultures grew MRSA and Pseudomonas and patient was being treated with vancomycin and Zosyn and he was discharged on ciprofloxacin and completed the course  -On admission presented  with tachycardia, lactic acid was 1.8, WBC count was 13.9 with hemoglobin of 14.3 and platelet count of 218  -His COVID-19 has been persistently positive on admission and he tested negative for influenza and RSV  -Chest x-ray done on 11/29 showed slightly worsening of the right basilar opacity likely due to worsening infection or atelectasis, left lung is clear and elevated right hemidiaphragm with no evidence of pleural effusion or pneumothorax  -On admit patient was started on IV fluids, vancomycin and levofloxacin and was being monitored in the hospital and he has always been on room air and his clinical condition has shown significant improvement with improvement in blood pressure.  And IV fluids were discontinued on 12/1 and he did receive stress dose steroids for 1 day  -MRSA swab is positive  -Blood cultures 1/2 done on 11/29 is positive for staph hominis and staph hemolyticus and likley contaminant and vancomycin was discontinued on (12/1 )   - given change in his clinical status with him being less communicative  restarted  vancomycin and infectious disease team has been consulted. ID consult appreciated  -Blood cultures likely contaminants  -Appreciate input from infectious disease team and will complete 7 to 10 days course of antibiotics and continue with vancomycin and Levaquin for now and        Mildly elevated creatinine-resolved  -Of note his potassium was within normal limits at 3.4 and creatinine was 1.14 and baseline creatinine is around was 0.8-1.1  -Creatinine today is normal at 0.71     Spastic hemiplegia  Traumatic brain injury  Nonverbal  Chronic right-sided paresis  Chronic dysphagia, s/p GJ tube  Clogged J tube  - Bed-bound and essentially nonverbal at baseline. Takes meds via G-tube. 5 cans Jevity/d via [facility].  - Chronic and stable on home meds, including Tegretol and Brivaracetam and continue the same  -Nutrition team is consulted and he is started on tube feeds and continue with free  water      Mild hypernatremia and mild hyperchloremia  -This can be likely due to recent use of IV fluids and he is already receiving free water at 80 cc every 4 hours and we will increase to 100 cc every 4 hours and check basic tomorrow morning     Panhypopituitarism  Hypothyroidism  - Chronic and stable on home meds, including hydrocortisone and Synthroid   -He was given stress dose steroids for 1 day and continue with PTA home dose of hydrocortisone at 15 mg in the morning and  7.5 in the evening and continue with PTA Synthroid     GERD  - Chronic and stable on PPI     Rash/itchiness on the right arm  -As per mom patient has been scratching his arm and she puts moisturizing/drying next cream and on exam does not look infected and will continue with local cares        Hypophosphatemia  -Phosphorus level this morning is 2.3 and we will continue the patient with replacement     Chronic anemia  -His hemoglobin has been fluctuating and baseline is difficult to assess as patient has been admitted multiple times recently but seems to be around 12 as per review of the EMR  -Hemoglobin on 12/1/2023 was 11.8 and we will continue to monitor and there is no evidence of any bleeding     Diet: NPO for Medical/Clinical Reasons Except for: No Exceptions  Adult Formula Drip Feeding: Continuous Jevity 1.5; Jejunostomy; Goal Rate: 50; mL/hr; Hold TF for 1 hour prior to and post levothyroxine. Initiate TF @ 20 ml/hr for 24 hours, advance by 10 ml q 12 hours until reach goal, pending tolerance and labs...    DVT Prophylaxis: enoxaparin (Lovenox) SQ  Lerma Catheter: Not present  Lines: None     Cardiac Monitoring: None  Code Status: Full Code          Clinically Significant Risk Factors          # Hypernatremia: Highest Na = 147 mmol/L in last 2 days, will monitor as appropriate                     # Financial/Environmental Concerns: none          Disposition Plan     Expected Discharge Date:   -- anticipate in 2 days    Disposition:    -- LTC    Interval History   -- chart reviewed  -- ID consult noted  --  patient is alert and very interactive     -Data reviewed today: I reviewed all new labs and imaging over the last 24 hours. I personally reviewed no images or EKG's today.    Physical Exam    , Blood pressure 124/62, pulse 52, temperature 97.6  F (36.4  C), temperature source Oral, resp. rate 16, weight 69.8 kg (153 lb 14.1 oz), SpO2 100%.  Vitals:    12/01/23 0420   Weight: 69.8 kg (153 lb 14.1 oz)     Vital Signs with Ranges  Temp:  [97  F (36.1  C)-98.1  F (36.7  C)] 97.6  F (36.4  C)  Pulse:  [50-66] 52  Resp:  [16-20] 16  BP: (123-129)/(53-64) 124/62  SpO2:  [95 %-100 %] 100 %  I/O's Last 24 hours  I/O last 3 completed shifts:  In: 180 [NG/GT:180]  Out: 975 [Urine:975]    Constitutional: Awake, alert, cooperative, no apparent distress  Respiratory: Clear to auscultation bilaterally, no crackles or wheezing  Cardiovascular: Regular rate and rhythm, normal S1 and S2   GI: Normal bowel sounds, soft, non-distended, non-tender  Skin/Integumen: No rashes, no cyanosis, no edema  Other: hemiplegic     Medications   All medications were reviewed.   dextrose        Brivaracetam  100 mg Oral or Feeding Tube BID    carBAMazepine  100 mg Per Feeding Tube Daily    carBAMazepine  150 mg Oral or Feeding Tube TID    enoxaparin ANTICOAGULANT  40 mg Subcutaneous Q24H    hydrocortisone  7.5 mg Oral or Feeding Tube Daily    hydrocortisone  15 mg Oral or Feeding Tube Daily    levofloxacin  750 mg Intravenous Q24H    levothyroxine  125 mcg Oral or Feeding Tube Daily    metoclopramide  10 mg Oral or Feeding Tube 4x Daily AC & HS    pantoprazole  40 mg Per Feeding Tube QAM AC    sodium bicarbonate  650 mg Per Feeding Tube BID    sodium chloride (PF)  3 mL Intracatheter Q8H    vancomycin  1,000 mg Intravenous Q12H        Data   Recent Labs   Lab 12/03/23  0727 12/02/23  1147 12/02/23  0509 12/01/23  1656 12/01/23  1024 11/30/23  1125 11/29/23  1510 11/28/23  1542  11/26/23  1743   WBC  --  5.6  --   --  8.6  --  13.9*   < > 9.6   HGB  --  11.8*  --   --  11.8*  --  14.3   < > 13.1*   MCV  --  92  --   --  93  --  91   < > 91   PLT  --  125*  --   --  147*  --  218   < > 222     --  147*  --  144  --  135   < > 139   POTASSIUM 3.7  --  3.5  --  3.6  --  3.4   < > 3.9   CHLORIDE 106  --  113*  --  111*  --  93*   < > 98   CO2 27  --  25  --  22  --  30*   < > 29   BUN 10.6  --  8.9  --  8.4  --  22.2   < > 21.8   CR 0.68  --  0.71  --  0.79  --  1.14   < > 0.92   ANIONGAP 8  --  9  --  11  --  12   < > 12   STEVE 8.0*  --  7.9*  --  8.0*  --  8.5*   < > 8.5*   GLC 95  --  103* 127* 122*   < > 112*   < > 113*   ALBUMIN  --   --   --   --   --   --  3.8  --  3.7   PROTTOTAL  --   --   --   --   --   --  7.8  --  7.2   BILITOTAL  --   --   --   --   --   --  0.4  --  0.2   ALKPHOS  --   --   --   --   --   --  107  --  107   ALT  --   --   --   --   --   --  20  --  25   AST  --   --   --   --   --   --  33  --  32    < > = values in this interval not displayed.       No results found for this or any previous visit (from the past 24 hour(s)).    Manish Motta MD  Text Page  (7am to 6pm)

## 2023-12-03 NOTE — PROGRESS NOTES
Rested well overnight. TF increased to goal rate (50ml/hr), tolerating well. PEG tube dressing CDI. External catheter in place, adequate urine output. Non-verbal, unable to assess orientation. Follows only some commands. T/R. Continues on intermittent IV abx. ID following.

## 2023-12-04 LAB — BACTERIA BLD CULT: NO GROWTH

## 2023-12-04 PROCEDURE — 99232 SBSQ HOSP IP/OBS MODERATE 35: CPT | Performed by: INTERNAL MEDICINE

## 2023-12-04 PROCEDURE — 250N000013 HC RX MED GY IP 250 OP 250 PS 637: Performed by: HOSPITALIST

## 2023-12-04 PROCEDURE — 250N000011 HC RX IP 250 OP 636

## 2023-12-04 PROCEDURE — 258N000003 HC RX IP 258 OP 636

## 2023-12-04 PROCEDURE — 120N000001 HC R&B MED SURG/OB

## 2023-12-04 PROCEDURE — 250N000011 HC RX IP 250 OP 636: Mod: JZ | Performed by: HOSPITALIST

## 2023-12-04 RX ORDER — CEFAZOLIN SODIUM 1 G/50ML
750 SOLUTION INTRAVENOUS EVERY 12 HOURS
Status: DISCONTINUED | OUTPATIENT
Start: 2023-12-04 | End: 2023-12-08 | Stop reason: HOSPADM

## 2023-12-04 RX ADMIN — METOCLOPRAMIDE HYDROCHLORIDE 10 MG: 5 SOLUTION ORAL at 11:52

## 2023-12-04 RX ADMIN — CARBAMAZEPINE 150 MG: 100 SUSPENSION ORAL at 06:20

## 2023-12-04 RX ADMIN — METOCLOPRAMIDE HYDROCHLORIDE 10 MG: 5 SOLUTION ORAL at 08:02

## 2023-12-04 RX ADMIN — CARBAMAZEPINE 150 MG: 100 SUSPENSION ORAL at 01:12

## 2023-12-04 RX ADMIN — ENOXAPARIN SODIUM 40 MG: 40 INJECTION SUBCUTANEOUS at 14:23

## 2023-12-04 RX ADMIN — LEVOTHYROXINE SODIUM 125 MCG: 100 TABLET ORAL at 08:02

## 2023-12-04 RX ADMIN — HYDROCORTISONE 15 MG: 10 TABLET ORAL at 08:02

## 2023-12-04 RX ADMIN — SODIUM BICARBONATE 650 MG TABLET 650 MG: at 08:02

## 2023-12-04 RX ADMIN — LEVOFLOXACIN 750 MG: 5 INJECTION, SOLUTION INTRAVENOUS at 15:56

## 2023-12-04 RX ADMIN — HYDROCORTISONE 7.5 MG: 5 TABLET ORAL at 17:36

## 2023-12-04 RX ADMIN — METOCLOPRAMIDE HYDROCHLORIDE 10 MG: 5 SOLUTION ORAL at 15:58

## 2023-12-04 RX ADMIN — BRIVARACETAM 100 MG: 10 SOLUTION ORAL at 21:47

## 2023-12-04 RX ADMIN — METOCLOPRAMIDE HYDROCHLORIDE 10 MG: 5 SOLUTION ORAL at 20:41

## 2023-12-04 RX ADMIN — SODIUM BICARBONATE 650 MG TABLET 650 MG: at 20:41

## 2023-12-04 RX ADMIN — CARBAMAZEPINE 100 MG: 100 SUSPENSION ORAL at 17:40

## 2023-12-04 RX ADMIN — Medication 40 MG: at 06:20

## 2023-12-04 RX ADMIN — BRIVARACETAM 100 MG: 10 SOLUTION ORAL at 08:02

## 2023-12-04 RX ADMIN — VANCOMYCIN HYDROCHLORIDE 750 MG: 500 INJECTION, POWDER, LYOPHILIZED, FOR SOLUTION INTRAVENOUS at 17:36

## 2023-12-04 RX ADMIN — VANCOMYCIN HYDROCHLORIDE 750 MG: 500 INJECTION, POWDER, LYOPHILIZED, FOR SOLUTION INTRAVENOUS at 06:15

## 2023-12-04 RX ADMIN — CARBAMAZEPINE 150 MG: 100 SUSPENSION ORAL at 11:52

## 2023-12-04 ASSESSMENT — ACTIVITIES OF DAILY LIVING (ADL)
ADLS_ACUITY_SCORE: 67

## 2023-12-04 NOTE — PROGRESS NOTES
Pt here with sepsis, recurrent pneumonia. Dificult to assess neuros, non verval, opens eyes spontaneously, britt orientation, apears comfortable turn and repo q2hrs however pt wiggles in bed.R sided hemiplegia. VSS  .NSRon tele, Npo with TF running at goal rate 50 ml/hr Q4 100 ml flushes.  Blanchable redness coccyx's, excoriated perineum, moisture rash, removed mepilex and applied barrier cream. External cath in place incontinent of bowel this shift.    Zulema Hinkle, RN

## 2023-12-04 NOTE — PHARMACY-VANCOMYCIN DOSING SERVICE
Pharmacy Vancomycin Note  Date of Service 2023  Patient's  1960   63 year old, male    Indication: Community Acquired Pneumonia and MRSA  Day of Therapy: 3  Current vancomycin regimen:  1000 mg IV q12h  Current vancomycin monitoring method: AUC  Current vancomycin therapeutic monitoring goal: 400-600 mg*h/L    Insight[employer] Prediction of Current Vancomycin Regimen  Regimen: 1000 mg IV every 12 hours.  Start time: 10:57 on 2023  Exposure target: AUC24 (range)400-600 mg/L.hr   AUC24,ss: 473 mg/L.hr  Probability of AUC24 > 400: 81 %  Ctrough,ss: 14.2 mg/L  Probability of Ctrough,ss > 20: 11 %  Probability of nephrotoxicity (Lodise ERNESTO ): 9 %    Current estimated CrCl = Estimated Creatinine Clearance: 112.6 mL/min (based on SCr of 0.68 mg/dL).    Creatinine for last 3 days  2023: 10:24 AM Creatinine 0.79 mg/dL  2023:  5:09 AM Creatinine 0.71 mg/dL  12/3/2023:  7:27 AM Creatinine 0.68 mg/dL    Recent Vancomycin Levels (past 3 days)  2023: 10:24 AM Vancomycin 14.6 ug/mL  12/3/2023: 11:10 PM Vancomycin 20.8 ug/mL    Vancomycin IV Administrations (past 72 hours)                     vancomycin (VANCOCIN) 1,000 mg in 200 mL dextrose intermittent infusion (mg) 1,000 mg New Bag 23 1136     1,000 mg New Bag  0028     1,000 mg New Bag 23 1149                    Nephrotoxins and other renal medications (From now, onward)      Start     Dose/Rate Route Frequency Ordered Stop    23 0500  vancomycin (VANCOCIN) 750 mg in sodium chloride 0.9 % 250 mL intermittent infusion         750 mg  over 60 Minutes Intravenous EVERY 12 HOURS 23 0013                 Contrast Orders - past 72 hours (72h ago, onward)      None            Interpretation of levels and current regimen:  Vancomycin level is reflective of AUC greater than 600    Has serum creatinine changed greater than 50% in last 72 hours: No    Urine output:  good urine output    Renal Function: Stable    [employer]ight[employer]  Prediction of Planned New Vancomycin Regimen  Loading dose: N/A  Regimen: 750 mg IV every 12 hours.  Start time: 06:00 on 12/04/2023  Exposure target: AUC24 (range)400-600 mg/L.hr   AUC24,ss: 460 mg/L.hr  Probability of AUC24 > 400: 82 %  Ctrough,ss: 15.4 mg/L  Probability of Ctrough,ss > 20: 9 %  Probability of nephrotoxicity (Lodise ERNESTO 2009): 11 %    Plan:  Decrease Dose to vancomycin 750 mg IV q12h  Vancomycin monitoring method: AUC  Vancomycin therapeutic monitoring goal: 400-600 mg*h/L  Pharmacy will check vancomycin levels as appropriate in 1-3 Days.  Serum creatinine levels will be ordered a minimum of twice weekly.    Stephany Mckay, PharmD, BCPS

## 2023-12-04 NOTE — PROGRESS NOTES
[facility]    Infectious Disease Progress Note    Date of Service (when I saw the patient): 12/04/2023     Assessment & Plan   Keyon Farias is a 63 year old who was admitted on 11/29/2023.       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    Recurrent pneumonia J18.9    Sepsis (H) A41.9    Need vaccination Z23      Many infection risks (Aspiration, UTI, C diff, dermo-sepsis, odonto-sepsis)  Per [facility], would recommend COVID, influenza and RSV vaccines  (+) MRSA nares noted  No antibiotic allergies listed  Bad teeth noted      Blood cultures almost certainly contaminant  1/2 sets (+)  2 separate Coag Neg staph     Vanco (cover MRSA) and levoflox (cover GNR / atypicals)  Day 6 of vanco today   Day 7 of Levaquin/ cipro GNR atypical antibiotics   Check with pharmacy if no drug interaction contraindication to linezolid then that can be an option   Consider 7-10 day course of antibiotics  as for MRSA pna    [facility] [place] updated bedside.     Cristiano follow peripherally call if ques        Senait Orona MD    Interval History     No new rashes or issues with antibiotics   Labs reviewed   No changes to past medical, social or family history   Afebrile       Physical Exam   Temp: 97.2  F (36.2  C) Temp src: Oral BP: 120/84 Pulse: 70   Resp: 16 SpO2: 99 % O2 Device: None (Room air)    Vitals:    12/01/23 0420   Weight: 69.8 kg (153 lb 14.1 oz)     Vital Signs with Ranges  Temp:  [97.2  F (36.2  C)-97.6  F (36.4  C)] 97.2  F (36.2  C)  Pulse:  [52-70] 70  Resp:  [14-16] 16  BP: (113-124)/(55-84) 120/84  SpO2:  [97 %-100 %] 99 %    Constitutional: Awake, does not interact   Lungs: Clear to auscultation bilaterally, no crackles or wheezing  Cardiovascular: Regular rate and rhythm, normal  S1 and S2, and no murmur noted  Abdomen: Normal bowel sounds, soft, non-distended, non-tender  Skin: No rashes, no cyanosis, no edema  Other:    Medications    dextrose        Brivaracetam  100 mg Oral or Feeding Tube BID    carBAMazepine  100 mg Per Feeding Tube Daily    carBAMazepine  150 mg Oral or Feeding Tube TID    enoxaparin ANTICOAGULANT  40 mg Subcutaneous Q24H    hydrocortisone  7.5 mg Oral or Feeding Tube Daily    hydrocortisone  15 mg Oral or Feeding Tube Daily    levofloxacin  750 mg Intravenous Q24H    levothyroxine  125 mcg Oral or Feeding Tube Daily    metoclopramide  10 mg Oral or Feeding Tube 4x Daily AC & HS    pantoprazole  40 mg Per Feeding Tube QAM AC    sodium bicarbonate  650 mg Per Feeding Tube BID    sodium chloride (PF)  3 mL Intracatheter Q8H    vancomycin  750 mg Intravenous Q12H       Data   All microbiology laboratory data reviewed.  Recent Labs   Lab Test 12/02/23  1147 12/01/23  1024 11/29/23  1510   WBC 5.6 8.6 13.9*   HGB 11.8* 11.8* 14.3   HCT 36.7* 36.9* 44.3   MCV 92 93 91   * 147* 218     Recent Labs   Lab Test 12/03/23  0727 12/02/23  0509 12/01/23  1024   CR 0.68 0.71 0.79     No lab results found.  Recent Labs   Lab Test 05/23/21  0316 05/23/21  0250 04/15/21  2250 02/22/21  1622 02/22/21  1439 02/22/21  1413 02/19/21  1155 02/19/21  1123 01/15/21  0214   CULT No growth No growth No growth No growth No growth No growth No growth No growth >100,000 colonies/mL  Klebsiella pneumoniae  *       All cultures:  Recent Labs   Lab 12/02/23  1146 11/29/23  1744 11/29/23  1535   CULTURE No growth after 1 day  No growth after 1 day Positive on the 1st day of incubation*  Staphylococcus haemolyticus*  Staphylococcus hominis* No growth after 4 days      Blood culture:     Urine culture:

## 2023-12-04 NOTE — PROGRESS NOTES
Pt here with sepsis, recurrent pneumonia, opens eyes spontaneously Is non verbal britt orientation,R sided hemiplegia. VSS  .Sr/Sb on tele, Npo with TF running at goal rate 50 ml/hr Q4 100 ml flushes.  Q2 T/R. Blanchable redness om coccyx's, mepilex in place.

## 2023-12-04 NOTE — PROGRESS NOTES
[facility]  Hospitalist Progress Note  Manish Motta MD  12/04/2023    Assessment & Plan   Keyon Farias is a 63 year old male with PMH significant for Hx of TBI with aphasia, R sided spastic hemiplegia, chronic hemiplegia and chronic dysphagia with GJ-tube for TFs/meds, seizure disorder ,  panhypopituitarism and hx of prior hospitalizations for recurrent pneumonia.      Recently he was admitted in  8/2023, then 9/2023 at which time he was noted with septic shock from pneumonia and required intubation that hospital stay. Respiratory cultures grew MR SA and Pseudomonas and he was treated with IV vancomycin and Zosyn and then changed to ciprofloxacin. He then got admitted again 10/25/23, treated for COVID pneumonia with Remdesivir and dexamethasone.      He then has had three ED visits in past four days (on 11/26, 11/28 and then 11/29) for bronchitis and has not been able to get ciprofloxacin as prescribed by the ED physician. His mother notes that he has been very somnolent, was noted hypotensive to SBP 80s which responded to increased fluids from feeding tube. He was also noted with low grade fever.     Likely early sepsis-resolved likely due to  MRSA pneumonia  Positive blood culture likely  1/2 staph hominis and staph hemolyticus and likley contaminant  Somnolence, metabolic encephalopathy likely secondary to above -fracture  Recent h/o COVID (10/25/2023), likely COVID recovered  recent hospitalization for septic shock from pneumonia requiring intubation (9/2023)  -Has hx of several prior hospitalizations for aspiration pneumonitis with recent hospitalizations and initial presentation as noted above  --last hospitalization when he was discharged on 10/28/2023 and during that hospitalization his respiratory cultures grew MRSA and Pseudomonas and patient was being treated with vancomycin and Zosyn and he was discharged on ciprofloxacin and completed the course  -On admission presented  with tachycardia, lactic acid was 1.8, WBC count was 13.9 with hemoglobin of 14.3 and platelet count of 218  -His COVID-19 has been persistently positive on admission and he tested negative for influenza and RSV  -Chest x-ray done on 11/29 showed slightly worsening of the right basilar opacity likely due to worsening infection or atelectasis, left lung is clear and elevated right hemidiaphragm with no evidence of pleural effusion or pneumothorax  -On admit patient was started on IV fluids, vancomycin and levofloxacin and was being monitored in the hospital and he has always been on room air and his clinical condition has shown significant improvement with improvement in blood pressure.  And IV fluids were discontinued on 12/1 and he did receive stress dose steroids for 1 day  -MRSA swab is positive  -Blood cultures 1/2 done on 11/29 is positive for staph hominis and staph hemolyticus and likley contaminant and vancomycin was discontinued on (12/1 )   - given change in his clinical status with him being less communicative  restarted  vancomycin and infectious disease team has been consulted. ID consult appreciated  -Blood cultures likely contaminants  -Appreciate input from infectious disease team and will complete 7 day course of antibiotics and continue with vancomycin and Levaquin         Mildly elevated creatinine-resolved  -Of note his potassium was within normal limits at 3.4 and creatinine was 1.14 and baseline creatinine is around was 0.8-1.1  -Creatinine today is normal at 0.71     Spastic hemiplegia  Traumatic brain injury  Nonverbal  Chronic right-sided paresis  Chronic dysphagia, s/p GJ tube  Clogged J tube  - Bed-bound and essentially nonverbal at baseline. Takes meds via G-tube. 5 cans Jevity/d via [employer].  - Chronic and stable on home meds, including Tegretol and Brivaracetam and continue the same  -Nutrition team is consulted and he is started on tube feeds and continue with free water      Mild  hypernatremia and mild hyperchloremia  -This can be likely due to recent use of IV fluids and he is already receiving free water at 80 cc every 4 hours and we will increase to 100 cc every 4 hours and check basic tomorrow morning     Panhypopituitarism  Hypothyroidism  - Chronic and stable on home meds, including hydrocortisone and Synthroid   -He was given stress dose steroids for 1 day and continue with PTA home dose of hydrocortisone at 15 mg in the morning and  7.5 in the evening and continue with PTA Synthroid     GERD  - Chronic and stable on PPI     Rash/itchiness on the right arm  -As per mom patient has been scratching his arm and she puts moisturizing/drying next cream and on exam does not look infected and will continue with local cares        Hypophosphatemia  -Phosphorus level this morning is 2.3 and we will continue the patient with replacement     Chronic anemia  -His hemoglobin has been fluctuating and baseline is difficult to assess as patient has been admitted multiple times recently but seems to be around 12 as per review of the EMR  -Hemoglobin on 12/1/2023 was 11.8 and we will continue to monitor and there is no evidence of any bleeding     Diet: NPO for Medical/Clinical Reasons Except for: No Exceptions  Adult Formula Drip Feeding: Continuous Jevity 1.5; Jejunostomy; Goal Rate: 50; mL/hr; Hold TF for 1 hour prior to and post levothyroxine. Initiate TF @ 20 ml/hr for 24 hours, advance by 10 ml q 12 hours until reach goal, pending tolerance and labs...    DVT Prophylaxis: enoxaparin (Lovenox) SQ  Lerma Catheter: Not present  Lines: None     Cardiac Monitoring: None  Code Status: Full Code          Clinically Significant Risk Factors          # Hypernatremia: Highest Na = 147 mmol/L in last 2 days, will monitor as appropriate                     # Financial/Environmental Concerns: none          Disposition Plan     Expected Discharge Date:   -- anticipate in 2 days    Disposition:   -- back to  home on 12/6    Interval History   -- discussed with RN  -- mother at bedside  --  patient is alert and very interactive     -Data reviewed today: I reviewed all new labs and imaging over the last 24 hours. I personally reviewed no images or EKG's today.    Physical Exam    , Blood pressure 120/84, pulse 70, temperature 97.2  F (36.2  C), temperature source Oral, resp. rate 16, weight 69.8 kg (153 lb 14.1 oz), SpO2 99%.  Vitals:    12/01/23 0420   Weight: 69.8 kg (153 lb 14.1 oz)     Vital Signs with Ranges  Temp:  [97.2  F (36.2  C)-97.6  F (36.4  C)] 97.2  F (36.2  C)  Pulse:  [52-70] 70  Resp:  [14-16] 16  BP: (113-124)/(55-84) 120/84  SpO2:  [97 %-100 %] 99 %  I/O's Last 24 hours  I/O last 3 completed shifts:  In: 45 [NG/GT:45]  Out: 1550 [Urine:1550]    Constitutional: Awake, alert, cooperative, no apparent distress  Respiratory: Clear to auscultation bilaterally, no crackles or wheezing  Cardiovascular: Regular rate and rhythm, normal S1 and S2   GI: Normal bowel sounds, soft, non-distended, non-tender  Skin/Integumen: No rashes, no cyanosis, no edema  Other: hemiplegic     Medications   All medications were reviewed.   dextrose        Brivaracetam  100 mg Oral or Feeding Tube BID    carBAMazepine  100 mg Per Feeding Tube Daily    carBAMazepine  150 mg Oral or Feeding Tube TID    enoxaparin ANTICOAGULANT  40 mg Subcutaneous Q24H    hydrocortisone  7.5 mg Oral or Feeding Tube Daily    hydrocortisone  15 mg Oral or Feeding Tube Daily    levofloxacin  750 mg Intravenous Q24H    levothyroxine  125 mcg Oral or Feeding Tube Daily    metoclopramide  10 mg Oral or Feeding Tube 4x Daily AC & HS    pantoprazole  40 mg Per Feeding Tube QAM AC    sodium bicarbonate  650 mg Per Feeding Tube BID    sodium chloride (PF)  3 mL Intracatheter Q8H    vancomycin  750 mg Intravenous Q12H        Data   Recent Labs   Lab 12/03/23  0727 12/02/23  1147 12/02/23  0509 12/01/23  1656 12/01/23  1024 11/30/23  1125 11/29/23  1510   WBC   --  5.6  --   --  8.6  --  13.9*   HGB  --  11.8*  --   --  11.8*  --  14.3   MCV  --  92  --   --  93  --  91   PLT  --  125*  --   --  147*  --  218     --  147*  --  144  --  135   POTASSIUM 3.7  --  3.5  --  3.6  --  3.4   CHLORIDE 106  --  113*  --  111*  --  93*   CO2 27  --  25  --  22  --  30*   BUN 10.6  --  8.9  --  8.4  --  22.2   CR 0.68  --  0.71  --  0.79  --  1.14   ANIONGAP 8  --  9  --  11  --  12   STEVE 8.0*  --  7.9*  --  8.0*  --  8.5*   GLC 95  --  103* 127* 122*   < > 112*   ALBUMIN  --   --   --   --   --   --  3.8   PROTTOTAL  --   --   --   --   --   --  7.8   BILITOTAL  --   --   --   --   --   --  0.4   ALKPHOS  --   --   --   --   --   --  107   ALT  --   --   --   --   --   --  20   AST  --   --   --   --   --   --  33    < > = values in this interval not displayed.       No results found for this or any previous visit (from the past 24 hour(s)).    Manish Motta MD  Text Page  (7am to 6pm)

## 2023-12-04 NOTE — PROGRESS NOTES
Rested well overnight. TF continues at goal rate, 50mL/hr. VSS on room air. Large, loose stool x1. Incontinent. Coccyx reddened, barrier cream applied. T/R. Scoring low on non-verbal pain scale. ID following. Continuing with IV abx.

## 2023-12-05 LAB
CREAT SERPL-MCNC: 0.78 MG/DL (ref 0.67–1.17)
EGFRCR SERPLBLD CKD-EPI 2021: >90 ML/MIN/1.73M2
PHOSPHATE SERPL-MCNC: 2.1 MG/DL (ref 2.5–4.5)
POTASSIUM SERPL-SCNC: 4.2 MMOL/L (ref 3.4–5.3)
VANCOMYCIN SERPL-MCNC: 14.8 UG/ML

## 2023-12-05 PROCEDURE — 80202 ASSAY OF VANCOMYCIN: CPT | Performed by: INTERNAL MEDICINE

## 2023-12-05 PROCEDURE — 82565 ASSAY OF CREATININE: CPT | Performed by: HOSPITALIST

## 2023-12-05 PROCEDURE — 99232 SBSQ HOSP IP/OBS MODERATE 35: CPT | Performed by: INTERNAL MEDICINE

## 2023-12-05 PROCEDURE — 120N000001 HC R&B MED SURG/OB

## 2023-12-05 PROCEDURE — 36415 COLL VENOUS BLD VENIPUNCTURE: CPT | Performed by: HOSPITALIST

## 2023-12-05 PROCEDURE — 250N000013 HC RX MED GY IP 250 OP 250 PS 637: Performed by: INTERNAL MEDICINE

## 2023-12-05 PROCEDURE — 250N000011 HC RX IP 250 OP 636

## 2023-12-05 PROCEDURE — 84100 ASSAY OF PHOSPHORUS: CPT | Performed by: HOSPITALIST

## 2023-12-05 PROCEDURE — 250N000011 HC RX IP 250 OP 636: Mod: JZ | Performed by: HOSPITALIST

## 2023-12-05 PROCEDURE — 999N000127 HC STATISTIC PERIPHERAL IV START W US GUIDANCE

## 2023-12-05 PROCEDURE — 36415 COLL VENOUS BLD VENIPUNCTURE: CPT | Performed by: INTERNAL MEDICINE

## 2023-12-05 PROCEDURE — 250N000013 HC RX MED GY IP 250 OP 250 PS 637: Performed by: HOSPITALIST

## 2023-12-05 PROCEDURE — 999N000040 HC STATISTIC CONSULT NO CHARGE VASC ACCESS

## 2023-12-05 PROCEDURE — 258N000003 HC RX IP 258 OP 636

## 2023-12-05 PROCEDURE — 84132 ASSAY OF SERUM POTASSIUM: CPT | Performed by: HOSPITALIST

## 2023-12-05 RX ADMIN — POTASSIUM & SODIUM PHOSPHATES POWDER PACK 280-160-250 MG 1 PACKET: 280-160-250 PACK at 20:22

## 2023-12-05 RX ADMIN — SODIUM BICARBONATE 650 MG TABLET 650 MG: at 09:06

## 2023-12-05 RX ADMIN — LEVOFLOXACIN 750 MG: 5 INJECTION, SOLUTION INTRAVENOUS at 16:48

## 2023-12-05 RX ADMIN — CARBAMAZEPINE 150 MG: 100 SUSPENSION ORAL at 05:39

## 2023-12-05 RX ADMIN — MICONAZOLE NITRATE: 2 POWDER TOPICAL at 20:36

## 2023-12-05 RX ADMIN — CARBAMAZEPINE 150 MG: 100 SUSPENSION ORAL at 00:25

## 2023-12-05 RX ADMIN — VANCOMYCIN HYDROCHLORIDE 750 MG: 500 INJECTION, POWDER, LYOPHILIZED, FOR SOLUTION INTRAVENOUS at 20:18

## 2023-12-05 RX ADMIN — CARBAMAZEPINE 150 MG: 100 SUSPENSION ORAL at 23:25

## 2023-12-05 RX ADMIN — ENOXAPARIN SODIUM 40 MG: 40 INJECTION SUBCUTANEOUS at 15:03

## 2023-12-05 RX ADMIN — BRIVARACETAM 100 MG: 10 SOLUTION ORAL at 20:33

## 2023-12-05 RX ADMIN — METOCLOPRAMIDE HYDROCHLORIDE 10 MG: 5 SOLUTION ORAL at 12:55

## 2023-12-05 RX ADMIN — HYDROCORTISONE 7.5 MG: 5 TABLET ORAL at 18:10

## 2023-12-05 RX ADMIN — CARBAMAZEPINE 150 MG: 100 SUSPENSION ORAL at 12:56

## 2023-12-05 RX ADMIN — SODIUM BICARBONATE 650 MG TABLET 650 MG: at 20:22

## 2023-12-05 RX ADMIN — METOCLOPRAMIDE HYDROCHLORIDE 10 MG: 5 SOLUTION ORAL at 16:49

## 2023-12-05 RX ADMIN — POTASSIUM & SODIUM PHOSPHATES POWDER PACK 280-160-250 MG 1 PACKET: 280-160-250 PACK at 16:49

## 2023-12-05 RX ADMIN — METOCLOPRAMIDE HYDROCHLORIDE 10 MG: 5 SOLUTION ORAL at 09:06

## 2023-12-05 RX ADMIN — HYDROCORTISONE 15 MG: 10 TABLET ORAL at 09:06

## 2023-12-05 RX ADMIN — LEVOTHYROXINE SODIUM 125 MCG: 100 TABLET ORAL at 05:43

## 2023-12-05 RX ADMIN — CARBAMAZEPINE 100 MG: 100 SUSPENSION ORAL at 18:11

## 2023-12-05 RX ADMIN — METOCLOPRAMIDE HYDROCHLORIDE 10 MG: 5 SOLUTION ORAL at 20:22

## 2023-12-05 RX ADMIN — BRIVARACETAM 100 MG: 10 SOLUTION ORAL at 09:54

## 2023-12-05 RX ADMIN — POTASSIUM & SODIUM PHOSPHATES POWDER PACK 280-160-250 MG 1 PACKET: 280-160-250 PACK at 12:55

## 2023-12-05 RX ADMIN — Medication 40 MG: at 05:43

## 2023-12-05 RX ADMIN — VANCOMYCIN HYDROCHLORIDE 750 MG: 500 INJECTION, POWDER, LYOPHILIZED, FOR SOLUTION INTRAVENOUS at 05:37

## 2023-12-05 ASSESSMENT — ACTIVITIES OF DAILY LIVING (ADL)
ADLS_ACUITY_SCORE: 67
ADLS_ACUITY_SCORE: 63
ADLS_ACUITY_SCORE: 63
ADLS_ACUITY_SCORE: 67

## 2023-12-05 NOTE — PLAN OF CARE
Pt in for recurrent pneumonia, early sepsis. Contact precautions maintained. HECTOR orientation/mentation, non-verbal aba baseline, but alert and able to give thumbs up/thumbs down. Spastic hemiplegia, chronic R sided paresis. VSS on RA, occasional congested cough. Incontinent B&B. No BM this shift. Unable to keep external cath in place, pt pulls it off. No s/s of pain. PIV SL. Scattered bruises and scratches on arms. Scattered scabs. Redness above memo area and inner legs/memo area, pt scratching at area. Blanchable redness on coccyx, incontinence cares done and barrier cream applied. A2 lift, T/R q2 when in bed. NPO, chronic dysphagia- GJ tube in place; continuous TF @50ml/hr, 100 mL free water Q4hrs programmed in the pump. Takes meds via G-tube. Discharge pending medical improvement.

## 2023-12-05 NOTE — PLAN OF CARE
Goal Outcome Evaluation:    Nonverbal, follows some commands. Cannot assess orientation. On IV abx, most likely will discharge on Wednesday after 7 days IV abx. NPO, TF running at goal rate. Incont. PIV S/L. Does not display signs of pain, when asked if he was in pain, can put thumb up and down and he motioned he was good. Ax2 lift, paraplegic. Coccyx red, barrier cream applied.

## 2023-12-05 NOTE — PROVIDER NOTIFICATION
"Provider Notification    Notified Person:  Manish Motta MD messaged via [employer] messaging at 1020    Purpose of notification: \"Pt has multiple spots on coccyx that are peeling and breaking down that are concerning for pressure injury. Skin is red--mepliex applied. Do you want to order WOC? He G tube site was red no drainage--will you assess when you round please. Phlebitis concern at IV site-marked & IV removed. Had vanco ran last around 0540-please advise if additional intervention needed. \"    Orders received: No response    Comments: Called pharmacist. --Applied heat pack.         ADDENDUM: Discussed with Dr. Motta during rounds. Monitor & apply drain dressing on G tube site. WOC ordered. Dr. Fagan will evaluate IV site--heat packs for now. Micanzole ordered for memo area.   "

## 2023-12-05 NOTE — PLAN OF CARE
Goal Outcome Evaluation:      Plan of Care Reviewed With: patient    Overall Patient Progress: no changeOverall Patient Progress: no change         Cognitive/Mentation: Alert. Sleeping inbetween assessments but arouses easily to voice. HECTOR orientation  Neuros/CMS: Intact ex mute, RLE & BLE weakness/spacisity   VS: stable.   Tele: SD.  GI: Last BM today. Incontinent.  : incontinent. Does not tolerate external cath-- pulls it off consistently. Redness on perineum.   Pulmonary: diminished   Pain: FLAAC 0.     Skin: coccyx red with concerns for pressure wounds at the areas that are peeling skin--mepliex in place, R heel blanchable redness-mepliex applied, L forearm possible early phlebitis--marked with skin marker. Redness around G tube site--MD notified of skin issues; awaiting rounds/new orders.   Activity: Assist x 2 with lift.  Diet: NPO TF. Takes pills G tube.     Therapies recs: not following  Discharge: pending    Aggression Stoplight Tool: yellow    End of shift summary: WOC ordered. Utilizing dry wipes with perineal lotion cleanser for incontinence care.

## 2023-12-05 NOTE — PHARMACY
Dr. Motta consulted pharmacy to determine if there are any drug-drug interactions with linezolid and the patients home medications.    The following drug-drug interactions include:   linezolid-carbamazepine  Contraindicated due to concurrent use can result in increased risk of serotonin syndrome  Linezolid-metoclopramide  Major DDI due to concurrent use can result in increase hypertensive crisis.    Do not recommend linezolid use with the carbamazepine in the patient.   Please contact pharmacy if have any questions: [phone]    Gely Joel, PharmD

## 2023-12-05 NOTE — PROGRESS NOTES
[facility]  Hospitalist Progress Note  Manish Motta MD  12/05/2023    Assessment & Plan   Keyon Farias is a 63 year old male with PMH significant for Hx of TBI with aphasia, R sided spastic hemiplegia, chronic hemiplegia and chronic dysphagia with GJ-tube for TFs/meds, seizure disorder ,  panhypopituitarism and hx of prior hospitalizations for recurrent pneumonia.      Recently he was admitted in  8/2023, then 9/2023 at which time he was noted with septic shock from pneumonia and required intubation that hospital stay. Respiratory cultures grew MR SA and Pseudomonas and he was treated with IV vancomycin and Zosyn and then changed to ciprofloxacin. He then got admitted again 10/25/23, treated for COVID pneumonia with Remdesivir and dexamethasone.      He then has had three ED visits in past four days (on 11/26, 11/28 and then 11/29) for bronchitis and has not been able to get ciprofloxacin as prescribed by the ED physician. His mother notes that he has been very somnolent, was noted hypotensive to SBP 80s which responded to increased fluids from feeding tube. He was also noted with low grade fever.     Likely early sepsis-resolved likely due to  MRSA pneumonia  Positive blood culture likely  1/2 staph hominis and staph hemolyticus and likley contaminant  Somnolence, metabolic encephalopathy likely secondary to above -fracture  Recent h/o COVID (10/25/2023), likely COVID recovered  recent hospitalization for septic shock from pneumonia requiring intubation (9/2023)  -Has hx of several prior hospitalizations for aspiration pneumonitis with recent hospitalizations and initial presentation as noted above  --last hospitalization when he was discharged on 10/28/2023 and during that hospitalization his respiratory cultures grew MRSA and Pseudomonas and patient was being treated with vancomycin and Zosyn and he was discharged on ciprofloxacin and completed the course  -On admission presented  with tachycardia, lactic acid was 1.8, WBC count was 13.9 with hemoglobin of 14.3 and platelet count of 218  -His COVID-19 has been persistently positive on admission and he tested negative for influenza and RSV  -Chest x-ray done on 11/29 showed slightly worsening of the right basilar opacity likely due to worsening infection or atelectasis, left lung is clear and elevated right hemidiaphragm with no evidence of pleural effusion or pneumothorax  -On admit patient was started on IV fluids, vancomycin and levofloxacin and was being monitored in the hospital and he has always been on room air and his clinical condition has shown significant improvement with improvement in blood pressure.  And IV fluids were discontinued on 12/1 and he did receive stress dose steroids for 1 day  -MRSA swab is positive  -Blood cultures 1/2 done on 11/29 is positive for staph hominis and staph hemolyticus and likley contaminant and vancomycin was discontinued on (12/1 )   - given change in his clinical status with him being less communicative  restarted  vancomycin and infectious disease team has been consulted. ID consult appreciated  -Blood cultures likely contaminants  -Appreciate input from infectious disease team and will complete 7 - 10 day course of antibiotics and continue with vancomycin, possibly transition to linezolid and Levaquin.         Mildly elevated creatinine-resolved  -Of note his potassium was within normal limits at 3.4 and creatinine was 1.14 and baseline creatinine is around was 0.8-1.1  -Creatinine today is normal at 0.71     Spastic hemiplegia  Traumatic brain injury  Nonverbal  Chronic right-sided paresis  Chronic dysphagia, s/p GJ tube  Clogged J tube  - Bed-bound and essentially nonverbal at baseline. Takes meds via G-tube. 5 cans Jevity/d via [facility].  - Chronic and stable on home meds, including Tegretol and Brivaracetam and continue the same  -Nutrition team is consulted and he is started on tube feeds and  continue with free water      Mild hypernatremia and mild hyperchloremia - resolved  -This can be likely due to recent use of IV fluids and he is already receiving free water       Panhypopituitarism  Hypothyroidism  - Chronic and stable on home meds, including hydrocortisone and Synthroid   -He was given stress dose steroids for 1 day and continue with PTA home dose of hydrocortisone at 15 mg in the morning and  7.5 in the evening and continue with PTA Synthroid     GERD  - Chronic and stable on PPI     Rash/itchiness on the right arm  -As per mom patient has been scratching his arm and she puts moisturizing/drying next cream and on exam does not look infected and will continue with local cares        Hypophosphatemia  -Phosphorus level this morning is 2.3 and we will continue the patient with replacement     Chronic anemia  -His hemoglobin has been fluctuating and baseline is difficult to assess as patient has been admitted multiple times recently but seems to be around 12 as per review of the EMR  -Hemoglobin on 12/1/2023 was 11.8 and we will continue to monitor and there is no evidence of any bleeding     Diet: NPO for Medical/Clinical Reasons Except for: No Exceptions  Adult Formula Drip Feeding: Continuous Jevity 1.5; Jejunostomy; Goal Rate: 50; mL/hr; Hold TF for 1 hour prior to and post levothyroxine. Initiate TF @ 20 ml/hr for 24 hours, advance by 10 ml q 12 hours until reach goal, pending tolerance and labs...    DVT Prophylaxis: enoxaparin (Lovenox) SQ  Lerma Catheter: Not present  Lines: None     Cardiac Monitoring: None  Code Status: Full Code          Clinically Significant Risk Factors          # Hypernatremia: Highest Na = 147 mmol/L in last 2 days, will monitor as appropriate                     # Financial/Environmental Concerns: none          Disposition Plan     Expected Discharge Date:   -- anticipate in 2 days    Disposition:   -- back to home on 12/6    Interval History   -- discussed with  RN  --  patient is alert and very interactive     -Data reviewed today: I reviewed all new labs and imaging over the last 24 hours. I personally reviewed no images or EKG's today.    Physical Exam    , Blood pressure 108/49, pulse 75, temperature 98  F (36.7  C), temperature source Axillary, resp. rate 15, weight 69.8 kg (153 lb 14.1 oz), SpO2 95%.  Vitals:    12/01/23 0420   Weight: 69.8 kg (153 lb 14.1 oz)     Vital Signs with Ranges  Temp:  [97.2  F (36.2  C)-98  F (36.7  C)] 98  F (36.7  C)  Pulse:  [60-75] 75  Resp:  [14-16] 15  BP: (108-124)/(49-59) 108/49  SpO2:  [93 %-100 %] 95 %  I/O's Last 24 hours  I/O last 3 completed shifts:  In: 200 [NG/GT:150]  Out: -     Constitutional:  no apparent distress  Respiratory: Clear to auscultation bilaterally, no crackles or wheezing  Cardiovascular: Regular rate and rhythm, normal S1 and S2   GI: Normal bowel sounds, soft, non-distended, non-tender  Skin/Integumen: No rashes, no cyanosis, no edema  Other: hemiplegic     Medications   All medications were reviewed.   dextrose        Brivaracetam  100 mg Oral or Feeding Tube BID    carBAMazepine  100 mg Per Feeding Tube Daily    carBAMazepine  150 mg Oral or Feeding Tube TID    enoxaparin ANTICOAGULANT  40 mg Subcutaneous Q24H    hydrocortisone  7.5 mg Oral or Feeding Tube Daily    hydrocortisone  15 mg Oral or Feeding Tube Daily    levofloxacin  750 mg Intravenous Q24H    levothyroxine  125 mcg Oral or Feeding Tube Daily    metoclopramide  10 mg Oral or Feeding Tube 4x Daily AC & HS    miconazole   Topical BID    pantoprazole  40 mg Per Feeding Tube QAM AC    potassium & sodium phosphates  1 packet Oral or Feeding Tube Q4H    sodium bicarbonate  650 mg Per Feeding Tube BID    sodium chloride (PF)  3 mL Intracatheter Q8H    vancomycin  750 mg Intravenous Q12H        Data   Recent Labs   Lab 12/05/23  1109 12/03/23  0727 12/02/23  1147 12/02/23  0509 12/01/23  1656 12/01/23  1024 11/30/23  1125 11/29/23  1510   WBC  --    --  5.6  --   --  8.6  --  13.9*   HGB  --   --  11.8*  --   --  11.8*  --  14.3   MCV  --   --  92  --   --  93  --  91   PLT  --   --  125*  --   --  147*  --  218   NA  --  141  --  147*  --  144  --  135   POTASSIUM 4.2 3.7  --  3.5  --  3.6  --  3.4   CHLORIDE  --  106  --  113*  --  111*  --  93*   CO2  --  27  --  25  --  22  --  30*   BUN  --  10.6  --  8.9  --  8.4  --  22.2   CR 0.78 0.68  --  0.71  --  0.79  --  1.14   ANIONGAP  --  8  --  9  --  11  --  12   STEVE  --  8.0*  --  7.9*  --  8.0*  --  8.5*   GLC  --  95  --  103* 127* 122*   < > 112*   ALBUMIN  --   --   --   --   --   --   --  3.8   PROTTOTAL  --   --   --   --   --   --   --  7.8   BILITOTAL  --   --   --   --   --   --   --  0.4   ALKPHOS  --   --   --   --   --   --   --  107   ALT  --   --   --   --   --   --   --  20   AST  --   --   --   --   --   --   --  33    < > = values in this interval not displayed.       No results found for this or any previous visit (from the past 24 hour(s)).    Manish Motta MD  Text Page  (7am to 6pm)

## 2023-12-05 NOTE — PROGRESS NOTES
Care Management Follow Up    Length of Stay (days): 6    Expected Discharge Date: 12/06/2023     Concerns to be Addressed:     n/a  Patient plan of care discussed at interdisciplinary rounds: Yes    Anticipated Discharge Disposition:  home     Anticipated Discharge Services:  home health   Anticipated Discharge DME:  resume home health     Patient/family educated on Medicare website which has current facility and service quality ratings:  yes  Education Provided on the Discharge Plan:  yes  Patient/Family in Agreement with the Plan:  yes    Referrals Placed by CM/SW:  transportation, home care  Private pay costs discussed: N/A    Additional Information:    Spoke with pt mom this AM; set up tentative stretcher discharge ride for tomorrow, mom prefers 1-2pm time range 12/6.     Date of ride: 12/6/2023  Time of ride: 12:41-13:26pm  Method of ride: stretcher   [facility]  [phone]  From: [facility] room 1722   To: [address] [place] [address]   Weight: 153 pounds, MRSA, VRE infection precautions       Brooke Leon RN, BSN, PHN  [facility]  Inpatient Care Management - FLOAT  [facility] Mobile: [phone] daily 7:30-4:00

## 2023-12-06 LAB
GLUCOSE BLDC GLUCOMTR-MCNC: 114 MG/DL (ref 70–99)
PHOSPHATE SERPL-MCNC: 2.5 MG/DL (ref 2.5–4.5)
POTASSIUM SERPL-SCNC: 4.7 MMOL/L (ref 3.4–5.3)

## 2023-12-06 PROCEDURE — 250N000013 HC RX MED GY IP 250 OP 250 PS 637: Performed by: INTERNAL MEDICINE

## 2023-12-06 PROCEDURE — 258N000003 HC RX IP 258 OP 636

## 2023-12-06 PROCEDURE — 84100 ASSAY OF PHOSPHORUS: CPT | Performed by: INTERNAL MEDICINE

## 2023-12-06 PROCEDURE — 120N000001 HC R&B MED SURG/OB

## 2023-12-06 PROCEDURE — 250N000011 HC RX IP 250 OP 636: Mod: JZ | Performed by: HOSPITALIST

## 2023-12-06 PROCEDURE — 36415 COLL VENOUS BLD VENIPUNCTURE: CPT | Performed by: INTERNAL MEDICINE

## 2023-12-06 PROCEDURE — 84132 ASSAY OF SERUM POTASSIUM: CPT | Performed by: INTERNAL MEDICINE

## 2023-12-06 PROCEDURE — G0463 HOSPITAL OUTPT CLINIC VISIT: HCPCS

## 2023-12-06 PROCEDURE — 99232 SBSQ HOSP IP/OBS MODERATE 35: CPT | Performed by: INTERNAL MEDICINE

## 2023-12-06 PROCEDURE — 250N000013 HC RX MED GY IP 250 OP 250 PS 637: Performed by: HOSPITALIST

## 2023-12-06 PROCEDURE — 250N000011 HC RX IP 250 OP 636: Mod: JZ

## 2023-12-06 RX ADMIN — SODIUM BICARBONATE 650 MG TABLET 650 MG: at 21:27

## 2023-12-06 RX ADMIN — POTASSIUM & SODIUM PHOSPHATES POWDER PACK 280-160-250 MG 1 PACKET: 280-160-250 PACK at 09:33

## 2023-12-06 RX ADMIN — MICONAZOLE NITRATE: 2 POWDER TOPICAL at 21:31

## 2023-12-06 RX ADMIN — LEVOFLOXACIN 750 MG: 5 INJECTION, SOLUTION INTRAVENOUS at 15:22

## 2023-12-06 RX ADMIN — POTASSIUM & SODIUM PHOSPHATES POWDER PACK 280-160-250 MG 1 PACKET: 280-160-250 PACK at 11:41

## 2023-12-06 RX ADMIN — CARBAMAZEPINE 150 MG: 100 SUSPENSION ORAL at 05:51

## 2023-12-06 RX ADMIN — BRIVARACETAM 100 MG: 10 SOLUTION ORAL at 21:28

## 2023-12-06 RX ADMIN — SODIUM BICARBONATE 650 MG TABLET 650 MG: at 09:33

## 2023-12-06 RX ADMIN — METOCLOPRAMIDE HYDROCHLORIDE 10 MG: 5 SOLUTION ORAL at 21:28

## 2023-12-06 RX ADMIN — Medication 40 MG: at 05:51

## 2023-12-06 RX ADMIN — METOCLOPRAMIDE HYDROCHLORIDE 10 MG: 5 SOLUTION ORAL at 09:34

## 2023-12-06 RX ADMIN — ENOXAPARIN SODIUM 40 MG: 40 INJECTION SUBCUTANEOUS at 14:13

## 2023-12-06 RX ADMIN — CARBAMAZEPINE 150 MG: 100 SUSPENSION ORAL at 13:54

## 2023-12-06 RX ADMIN — CARBAMAZEPINE 100 MG: 100 SUSPENSION ORAL at 17:33

## 2023-12-06 RX ADMIN — MICONAZOLE NITRATE: 2 POWDER TOPICAL at 09:52

## 2023-12-06 RX ADMIN — VANCOMYCIN HYDROCHLORIDE 750 MG: 500 INJECTION, POWDER, LYOPHILIZED, FOR SOLUTION INTRAVENOUS at 21:31

## 2023-12-06 RX ADMIN — BRIVARACETAM 100 MG: 10 SOLUTION ORAL at 09:33

## 2023-12-06 RX ADMIN — HYDROCORTISONE 15 MG: 10 TABLET ORAL at 09:33

## 2023-12-06 RX ADMIN — HYDROCORTISONE 7.5 MG: 5 TABLET ORAL at 17:33

## 2023-12-06 RX ADMIN — POTASSIUM & SODIUM PHOSPHATES POWDER PACK 280-160-250 MG 1 PACKET: 280-160-250 PACK at 15:23

## 2023-12-06 RX ADMIN — METOCLOPRAMIDE HYDROCHLORIDE 10 MG: 5 SOLUTION ORAL at 15:23

## 2023-12-06 RX ADMIN — LEVOTHYROXINE SODIUM 125 MCG: 100 TABLET ORAL at 05:51

## 2023-12-06 RX ADMIN — VANCOMYCIN HYDROCHLORIDE 750 MG: 500 INJECTION, POWDER, LYOPHILIZED, FOR SOLUTION INTRAVENOUS at 09:33

## 2023-12-06 RX ADMIN — METOCLOPRAMIDE HYDROCHLORIDE 10 MG: 5 SOLUTION ORAL at 11:41

## 2023-12-06 ASSESSMENT — ACTIVITIES OF DAILY LIVING (ADL)
ADLS_ACUITY_SCORE: 67

## 2023-12-06 NOTE — CONSULTS
[facility] Nurse Inpatient Assessment     Consulted for:  Coccyx     Summary:  Mix of IAD (incontinence-associated dermatitis) and pressure to coccyx/ buttocks/ perineal area, with possible fungal component.  High risk for this to turn into a pressure injury over the sacrum.     Patient History (according to provider note(s):      Keyon Farias is a 63 year old male with PMH significant for Hx of TBI with aphasia, R sided spastic hemiplegia, chronic hemiplegia and chronic dysphagia with GJ-tube for TFs/meds, seizure disorder ,  panhypopituitarism and hx of prior hospitalizations for recurrent pneumonia.     Areas Assessed:      Areas visualized during today's visit: Sacrum/coccyx    Wound location: Coccyx/ buttocks    Last photo: 12-5-23      Wound due to: Incontinence Associated Dermatitis (IAD) with pressure component, fungal component  Wound history/plan of care: pt incontinent of urine and stool, needs assist x 2 for cares  Wound base: red semi-moist tissue and peeling epidermis     Palpation of the wound bed: normal      Drainage: scant     Description of drainage: serosanguinous     Measurements (length x width x depth, in cm): approx an 8 x 8cm cluster area, superficial depth     Tunneling: N/A     Undermining: N/A  Periwound skin: Erythema- blanchable, Macerated, and Rash      Color: pink      Temperature: normal   Odor: none  Pain: moderate, tender  Pain interventions prior to dressing change: slow and gentle cares   Treatment goal: Heal , Decrease moisture, and Protection  STATUS: initial assessment  Supplies ordered: supplies stored on unit       Treatment Plan:     Perineal cares: BID and prn:  Cleanse with Rita perineal lotion and soft dry wipes.   Apply antifungal powder to skin folds and all rashy areas.  If frequent incontinence, leave coccyx/sacrum open to air.  Otherwise can trial Mepilex Sacral to upper sacrum - first dust the area with antifungal powder, brush off  excess, then apply Cavilon no-sting barrier over the powder.  Let the Cavilon dry, then apply the dressing, keeping well above the perianal area/ gluteal cleft.  Only change the dressing every other day and prn when soiled.  If need to change more than once a day, it is not appropriate for a dressing - leave open to air.   PIP measures.  Add ZULY pump to the mattress to decrease moisture.     Pressure Injury Prevention (PIP) Plan:  -If patient is declining pressure injury prevention interventions: Explore reason why and address patient's concerns  -Mattress: Add ZULY pump  -HOB: Maintain at or below 30 degrees, unless contraindicated  -Repositioning in bed: Every 1-2 hours , Left/right positioning; avoid supine, and Raise foot of bed prior to raising head of bed, to reduce patient sliding down (shear)  -Heels: Keep elevated off mattress and Pillows under calves  -Protective Dressing:  Mepilex prn unless too moist  -Moisture Management: Perineal cleansing /protection: Follow Incontinence Protocol and WOC orders   -Under Devices: Inspect skin under all medical devices during skin inspection , Ensure tubes are stabilized without tension, and Ensure patient is not lying on medical devices or equipment when repositioned        Orders: Written    RECOMMEND PRIMARY TEAM ORDER: None, at this time  Education provided: plan of care  Discussed plan of care with: Patient and Nurse  WOC nurse follow-up plan: weekly and prn  Notify WOC if wound(s) deteriorate.  Nursing to notify the Provider(s) and re-consult the WOC Nurse if new skin concern.    DATA:     Current support surface: Standard  Standard gel/foam mattress (IsoFlex, Atmos air, etc)  Containment of urine/stool: Incontinence Protocol  BMI: Body mass index is 20.87 kg/m .   Active diet order: Orders Placed This Encounter      NPO for Medical/Clinical Reasons Except for: No Exceptions     Output: I/O last 3 completed shifts:  In: 1150 [NG/GT:1150]  Out: -      Labs:   Recent  Labs   Lab 12/02/23  1147 12/01/23  1024 11/29/23  1510   ALBUMIN  --   --  3.8   HGB 11.8*   < > 14.3   WBC 5.6   < > 13.9*    < > = values in this interval not displayed.     Pressure injury risk assessment:   Sensory Perception: 2-->very limited  Moisture: 2-->very moist  Activity: 1-->bedfast  Mobility: 1-->completely immobile  Nutrition: 3-->adequate  Friction and Shear: 1-->problem  Ricci Score: 10    Kristie Norris RN CWOCN  -Securely message with [employer] ([facility] [employer] [facility])   -[facility] Office Phone: [phone] (messages checked periodically Mon-Fri 8a-4p)

## 2023-12-06 NOTE — PLAN OF CARE
Reason for Admission: recurrent pneumonia    Cognitive/Mentation: Alert, mute, HECTOR mentation  Neuros/CMS: Intact ex R spastic hemiplegia, RUE 1/5, LUE 4/5, RLE and LLE 1/5, chronic dysphasia  VS: VSS, RA.   GI: Last BM 12-6-23. Incontinent.  : Incontinent.  Pulmonary: LS Clear.  Pain: Appears calm.     Drains/Lines: GJ tube on TF, L PIV  Skin: red/rash groin and coccyx- mepilex, barrier cream and powder. Frequent brief change.   Activity: Assist x 2 turn/repo  Diet: NPO on TF. Takes pills crushed in GJ.     Discharge: 12/9/23 home with mother

## 2023-12-06 NOTE — PROGRESS NOTES
Care Management Follow Up    Length of Stay (days): 7    Expected Discharge Date: 12/06/2023     Concerns to be Addressed:       Patient plan of care discussed at interdisciplinary rounds: Yes    Anticipated Discharge Disposition:  home     Anticipated Discharge Services:    Anticipated Discharge DME:      Patient/family educated on Medicare website which has current facility and service quality ratings:    Education Provided on the Discharge Plan:    Patient/Family in Agreement with the Plan:      Referrals Placed by CM/SW:  transportation  Private pay costs discussed: Not applicable    Additional Information:  Per Hospitalist, patient not ready for discharge today.  [facility] stretcher ride cancelled.    Courtney Brown RN, BSN, PHN  Inpatient Care Coordination  [facility]  Phone: [phone]

## 2023-12-06 NOTE — PROGRESS NOTES
[facility]  Hospitalist Progress Note  Manish Motta MD  12/06/2023    Assessment & Plan   Keyon Farias is a 63 year old male with PMH significant for Hx of TBI with aphasia, R sided spastic hemiplegia, chronic hemiplegia and chronic dysphagia with GJ-tube for TFs/meds, seizure disorder ,  panhypopituitarism and hx of prior hospitalizations for recurrent pneumonia.      Recently he was admitted in  8/2023, then 9/2023 at which time he was noted with septic shock from pneumonia and required intubation that hospital stay. Respiratory cultures grew MR SA and Pseudomonas and he was treated with IV vancomycin and Zosyn and then changed to ciprofloxacin. He then got admitted again 10/25/23, treated for COVID pneumonia with Remdesivir and dexamethasone.      He then has had three ED visits in past four days (on 11/26, 11/28 and then 11/29) for bronchitis and has not been able to get ciprofloxacin as prescribed by the ED physician. His mother notes that he has been very somnolent, was noted hypotensive to SBP 80s which responded to increased fluids from feeding tube. He was also noted with low grade fever.     Likely early sepsis-resolved likely due to  MRSA pneumonia  Positive blood culture likely  1/2 staph hominis and staph hemolyticus and likley contaminant  Somnolence, metabolic encephalopathy likely secondary to above -fracture  Recent h/o COVID (10/25/2023), likely COVID recovered  recent hospitalization for septic shock from pneumonia requiring intubation (9/2023)  -Has hx of several prior hospitalizations for aspiration pneumonitis with recent hospitalizations and initial presentation as noted above  --last hospitalization when he was discharged on 10/28/2023 and during that hospitalization his respiratory cultures grew MRSA and Pseudomonas and patient was being treated with vancomycin and Zosyn and he was discharged on ciprofloxacin and completed the course  -On admission presented  with tachycardia, lactic acid was 1.8, WBC count was 13.9 with hemoglobin of 14.3 and platelet count of 218  -His COVID-19 has been persistently positive on admission and he tested negative for influenza and RSV  -Chest x-ray done on 11/29 showed slightly worsening of the right basilar opacity likely due to worsening infection or atelectasis, left lung is clear and elevated right hemidiaphragm with no evidence of pleural effusion or pneumothorax  -On admit patient was started on IV fluids, vancomycin and levofloxacin and was being monitored in the hospital and he has always been on room air and his clinical condition has shown significant improvement with improvement in blood pressure.  And IV fluids were discontinued on 12/1 and he did receive stress dose steroids for 1 day  -MRSA swab is positive  -Blood cultures 1/2 done on 11/29 is positive for staph hominis and staph hemolyticus and likley contaminant and vancomycin was discontinued on (12/1 )   - given change in his clinical status with him being less communicative  restarted  vancomycin and infectious disease team has been consulted. ID consult appreciated  -Blood cultures likely contaminants  -Appreciate input from infectious disease team and will complete 7 - 10 day course of antibiotics and continue with vancomycin and Levaquin.         Mildly elevated creatinine-resolved  -Of note his potassium was within normal limits at 3.4 and creatinine was 1.14 and baseline creatinine is around was 0.8-1.1  -Creatinine  is normal at 0.71     Spastic hemiplegia  Traumatic brain injury  Nonverbal  Chronic right-sided paresis  Chronic dysphagia, s/p GJ tube  Clogged J tube  - Bed-bound and essentially nonverbal at baseline. Takes meds via G-tube. 5 cans Jevity/d via [employer].  - Chronic and stable on home meds, including Tegretol and Brivaracetam and continue the same  -Nutrition team is consulted and he is started on tube feeds and continue with free water      Mild  hypernatremia and mild hyperchloremia - resolved  -This can be likely due to recent use of IV fluids and he is already receiving free water       Panhypopituitarism  Hypothyroidism  - Chronic and stable on home meds, including hydrocortisone and Synthroid   -He was given stress dose steroids for 1 day and continue with PTA home dose of hydrocortisone at 15 mg in the morning and  7.5 in the evening and continue with PTA Synthroid     GERD  - Chronic and stable on PPI     Rash/itchiness on the right arm  -As per mom patient has been scratching his arm and she puts moisturizing/drying next cream and on exam does not look infected and will continue with local cares        Hypophosphatemia  -Phosphorus level this morning is 2.3 and we will continue the patient with replacement     Chronic anemia  -His hemoglobin has been fluctuating and baseline is difficult to assess as patient has been admitted multiple times recently but seems to be around 12 as per review of the EMR  -Hemoglobin on 12/1/2023 was 11.8 and we will continue to monitor and there is no evidence of any bleeding     Diet: NPO for Medical/Clinical Reasons Except for: No Exceptions  Adult Formula Drip Feeding: Continuous Jevity 1.5; Jejunostomy; Goal Rate: 50; mL/hr; Hold TF for 1 hour prior to and post levothyroxine. Initiate TF @ 20 ml/hr for 24 hours, advance by 10 ml q 12 hours until reach goal, pending tolerance and labs...    DVT Prophylaxis: enoxaparin (Lovenox) SQ  Lerma Catheter: Not present  Lines: None     Cardiac Monitoring: None  Code Status: Full Code          Clinically Significant Risk Factors          # Hypernatremia: Highest Na = 147 mmol/L in last 2 days, will monitor as appropriate                     # Financial/Environmental Concerns: none          Disposition Plan     Expected Discharge Date:   -- anticipate on 12/8    Disposition:   -- back to home on 12/6    Interval History   -- discussed with RN  --  patient is alert and  unchanged    -Data reviewed today: I reviewed all new labs and imaging over the last 24 hours. I personally reviewed no images or EKG's today.    Physical Exam    , Blood pressure 106/51, pulse 60, temperature 98.7  F (37.1  C), temperature source Axillary, resp. rate 17, weight 69.8 kg (153 lb 14.1 oz), SpO2 100%.  Vitals:    12/01/23 0420   Weight: 69.8 kg (153 lb 14.1 oz)     Vital Signs with Ranges  Temp:  [97.5  F (36.4  C)-98.7  F (37.1  C)] 98.7  F (37.1  C)  Pulse:  [59-68] 60  Resp:  [16-18] 17  BP: (106-135)/(51-61) 106/51  SpO2:  [98 %-100 %] 100 %  I/O's Last 24 hours  I/O last 3 completed shifts:  In: 1220 [NG/GT:1220]  Out: -     Constitutional:  no apparent distress  Respiratory: Clear to auscultation bilaterally, no crackles or wheezing  Cardiovascular: Regular rate and rhythm, normal S1 and S2   GI: Normal bowel sounds, soft, non-distended, non-tender  Skin/Integumen: No rashes, no cyanosis, no edema  Other: hemiplegic     Medications   All medications were reviewed.   dextrose        Brivaracetam  100 mg Oral or Feeding Tube BID    carBAMazepine  100 mg Per Feeding Tube Daily    carBAMazepine  150 mg Oral or Feeding Tube TID    enoxaparin ANTICOAGULANT  40 mg Subcutaneous Q24H    hydrocortisone  7.5 mg Oral or Feeding Tube Daily    hydrocortisone  15 mg Oral or Feeding Tube Daily    levofloxacin  750 mg Intravenous Q24H    levothyroxine  125 mcg Oral or Feeding Tube Daily    metoclopramide  10 mg Oral or Feeding Tube 4x Daily AC & HS    miconazole   Topical BID    pantoprazole  40 mg Per Feeding Tube QAM AC    potassium & sodium phosphates  1 packet Oral or Feeding Tube Q4H    sodium bicarbonate  650 mg Per Feeding Tube BID    sodium chloride (PF)  3 mL Intracatheter Q8H    vancomycin  750 mg Intravenous Q12H        Data   Recent Labs   Lab 12/05/23  1109 12/03/23  0727 12/02/23  1147 12/02/23  0509 12/01/23  1656 12/01/23  1024   WBC  --   --  5.6  --   --  8.6   HGB  --   --  11.8*  --   --   11.8*   MCV  --   --  92  --   --  93   PLT  --   --  125*  --   --  147*   NA  --  141  --  147*  --  144   POTASSIUM 4.2 3.7  --  3.5  --  3.6   CHLORIDE  --  106  --  113*  --  111*   CO2  --  27  --  25  --  22   BUN  --  10.6  --  8.9  --  8.4   CR 0.78 0.68  --  0.71  --  0.79   ANIONGAP  --  8  --  9  --  11   STEVE  --  8.0*  --  7.9*  --  8.0*   GLC  --  95  --  103* 127* 122*       No results found for this or any previous visit (from the past 24 hour(s)).    Manish Motta MD  Text Page  (7am to 6pm)

## 2023-12-06 NOTE — PLAN OF CARE
Reason for Admission: Recurrent pneumonia, early sepsis.     Cognitive/Mentation: HECTOR, nonverbal at baseline  Neuros/CMS: Alert and opens eyes spontaneously, able to give thumbs up/thumbs down. R sided spastic hemiplegia, RUE 1/5, LUE 4/5, LUE 1/5, RLE 1/5.   VS: Stable on RA  Tele: Discontinued by MD  GI: Incontinent, voiding frequently, does not tolerate external cath- immediately pulls it off  : Incontinent, medium loose/watery BM around 0200.   Pulmonary: LS diminished   Pain: Moaning and groaning during incontinence cares, otherwise no s/s of pain  Drains/Lines: L PIV SL and CDI   Skin: Scattered bruises and scratches on arms. Scattered scabs. Rash-like redness in groin/memo area, miconazole powder applied. Blanchable redness on coccyx, new mepilex applied, T/R q2. Dry flaky heels and blanchable redness, heels elevated. Scattered scabs and bruises on arms  Activity: Assist x2 w/lift  Diet: NPO- chronic dysphagia and TF through GJ tube, TF @50ml/hr, 100 mL free water Q4hrs programmed in the pump. Takes meds crushed through GJ tube. Site dressing reinforced x2, pt pulling at line.     Therapies recs: Not following. WOC consult ordered yesterday for coccyx and GJ tube site.   Discharge: Resume home health today, stretcher discharge ride between 7902-8322.     Aggression Spotlight Tool: Green    End of shift summary: Contact precautions maintained. Last phos replaced this evening, recheck in the morning. On phosphorous and potassium replacement protocol.

## 2023-12-06 NOTE — PHARMACY-VANCOMYCIN DOSING SERVICE
Pharmacy Vancomycin Note  Date of Service 2023  Patient's  1960   63 year old, male    Indication: Community Acquired Pneumonia and MRSA  Day of Therapy: 4th consecutive day of vanco tx  Current vancomycin regimen:  750 mg IV q12h  Current vancomycin monitoring method: AUC  Current vancomycin therapeutic monitoring goal: 400-600 mg*h/L    Insight[employer] Prediction of Current Vancomycin Regimen  Loading dose: N/A  Regimen: 750 mg IV every 12 hours.  Start time: 06:00 on 2023  Exposure target: AUC24 (range)400-600 mg/L.hr   AUC24,ss: 460 mg/L.hr  Probability of AUC24 > 400: 82 %  Ctrough,ss: 15.4 mg/L  Probability of Ctrough,ss > 20: 9 %  Probability of nephrotoxicity (Lodise ERNESTO ): 11 %    Current estimated CrCl = Estimated Creatinine Clearance: 98.2 mL/min (based on SCr of 0.78 mg/dL).    Creatinine for last 3 days  12/3/2023:  7:27 AM Creatinine 0.68 mg/dL  2023: 11:09 AM Creatinine 0.78 mg/dL    Recent Vancomycin Levels (past 3 days)  12/3/2023: 11:10 PM Vancomycin 20.8 ug/mL  2023:  7:07 PM Vancomycin 14.8 ug/mL    Vancomycin IV Administrations (past 72 hours)                     vancomycin (VANCOCIN) 750 mg in sodium chloride 0.9 % 250 mL intermittent infusion (mg) 750 mg New Bag 23     750 mg New Bag  0537     750 mg New Bag 23 1736     750 mg New Bag  0615    vancomycin (VANCOCIN) 1,000 mg in 200 mL dextrose intermittent infusion (mg) 1,000 mg New Bag 23 1136     1,000 mg New Bag  0028                    Nephrotoxins and other renal medications (From now, onward)      Start     Dose/Rate Route Frequency Ordered Stop    23 0500  vancomycin (VANCOCIN) 750 mg in sodium chloride 0.9 % 250 mL intermittent infusion         750 mg  over 60 Minutes Intravenous EVERY 12 HOURS 23 0013                 Contrast Orders - past 72 hours (72h ago, onward)      None            Interpretation of levels and current regimen:  Vancomycin level is reflective of  -600    Has serum creatinine changed greater than 50% in last 72 hours: No    Urine output:  good urine output    Renal Function: Stable    InsightRX Prediction of Planned New Vancomycin Regimen  Loading dose: N/A  Regimen: 750 mg IV every 12 hours.  Start time: 08:18 on 12/06/2023  Exposure target: AUC24 (range)400-600 mg/L.hr   AUC24,ss: 507 mg/L.hr  Probability of AUC24 > 400: 97 %  Ctrough,ss: 17.1 mg/L  Probability of Ctrough,ss > 20: 13 %  Probability of nephrotoxicity (Lodise ERNESTO 2009): 13 %    Plan:  Continue Current Dose  Vancomycin monitoring method: AUC  Vancomycin therapeutic monitoring goal: 400-600 mg*h/L  Pharmacy will check vancomycin levels as appropriate in 1-3 Days.  Serum creatinine levels will be ordered daily for the first week of therapy and at least twice weekly for subsequent weeks.    Stephany Mckay, PharmD, BCPS

## 2023-12-06 NOTE — PROGRESS NOTES
Called lab at 1800 that vanco level needs to be drawn STAT. No  notified writer that lab draw was unsuccessful earlier.     ADDENDUM: Called Pharmacist that lab is still not drawn at 1858. Asked if vanco needs to be re-timed. Pharmacist will adjust dosing based off when med can be given

## 2023-12-07 LAB
BACTERIA BLD CULT: NO GROWTH
BACTERIA BLD CULT: NO GROWTH
PHOSPHATE SERPL-MCNC: 2.5 MG/DL (ref 2.5–4.5)
POTASSIUM SERPL-SCNC: 4.5 MMOL/L (ref 3.4–5.3)

## 2023-12-07 PROCEDURE — 99232 SBSQ HOSP IP/OBS MODERATE 35: CPT | Performed by: INTERNAL MEDICINE

## 2023-12-07 PROCEDURE — 120N000001 HC R&B MED SURG/OB

## 2023-12-07 PROCEDURE — 84132 ASSAY OF SERUM POTASSIUM: CPT | Performed by: INTERNAL MEDICINE

## 2023-12-07 PROCEDURE — 36415 COLL VENOUS BLD VENIPUNCTURE: CPT | Performed by: INTERNAL MEDICINE

## 2023-12-07 PROCEDURE — 999N000040 HC STATISTIC CONSULT NO CHARGE VASC ACCESS

## 2023-12-07 PROCEDURE — 258N000003 HC RX IP 258 OP 636

## 2023-12-07 PROCEDURE — 999N000127 HC STATISTIC PERIPHERAL IV START W US GUIDANCE

## 2023-12-07 PROCEDURE — 250N000013 HC RX MED GY IP 250 OP 250 PS 637: Performed by: HOSPITALIST

## 2023-12-07 PROCEDURE — 250N000011 HC RX IP 250 OP 636: Mod: JZ | Performed by: HOSPITALIST

## 2023-12-07 PROCEDURE — 84100 ASSAY OF PHOSPHORUS: CPT | Performed by: INTERNAL MEDICINE

## 2023-12-07 PROCEDURE — 250N000011 HC RX IP 250 OP 636

## 2023-12-07 RX ADMIN — VANCOMYCIN HYDROCHLORIDE 750 MG: 500 INJECTION, POWDER, LYOPHILIZED, FOR SOLUTION INTRAVENOUS at 08:46

## 2023-12-07 RX ADMIN — HYDROCORTISONE 7.5 MG: 5 TABLET ORAL at 17:52

## 2023-12-07 RX ADMIN — METOCLOPRAMIDE HYDROCHLORIDE 10 MG: 5 SOLUTION ORAL at 16:04

## 2023-12-07 RX ADMIN — MICONAZOLE NITRATE: 2 POWDER TOPICAL at 20:33

## 2023-12-07 RX ADMIN — VANCOMYCIN HYDROCHLORIDE 750 MG: 500 INJECTION, POWDER, LYOPHILIZED, FOR SOLUTION INTRAVENOUS at 20:39

## 2023-12-07 RX ADMIN — CARBAMAZEPINE 150 MG: 100 SUSPENSION ORAL at 06:36

## 2023-12-07 RX ADMIN — BRIVARACETAM 100 MG: 10 SOLUTION ORAL at 20:25

## 2023-12-07 RX ADMIN — BRIVARACETAM 100 MG: 10 SOLUTION ORAL at 08:47

## 2023-12-07 RX ADMIN — LEVOFLOXACIN 750 MG: 5 INJECTION, SOLUTION INTRAVENOUS at 16:04

## 2023-12-07 RX ADMIN — CARBAMAZEPINE 150 MG: 100 SUSPENSION ORAL at 13:09

## 2023-12-07 RX ADMIN — METOCLOPRAMIDE HYDROCHLORIDE 10 MG: 5 SOLUTION ORAL at 08:47

## 2023-12-07 RX ADMIN — SODIUM BICARBONATE 650 MG TABLET 650 MG: at 08:47

## 2023-12-07 RX ADMIN — SODIUM BICARBONATE 650 MG TABLET 650 MG: at 20:25

## 2023-12-07 RX ADMIN — LEVOTHYROXINE SODIUM 125 MCG: 100 TABLET ORAL at 06:43

## 2023-12-07 RX ADMIN — CARBAMAZEPINE 150 MG: 100 SUSPENSION ORAL at 00:42

## 2023-12-07 RX ADMIN — METOCLOPRAMIDE HYDROCHLORIDE 10 MG: 5 SOLUTION ORAL at 20:25

## 2023-12-07 RX ADMIN — CARBAMAZEPINE 100 MG: 100 SUSPENSION ORAL at 17:50

## 2023-12-07 RX ADMIN — HYDROCORTISONE 15 MG: 10 TABLET ORAL at 08:47

## 2023-12-07 RX ADMIN — ENOXAPARIN SODIUM 40 MG: 40 INJECTION SUBCUTANEOUS at 13:49

## 2023-12-07 RX ADMIN — METOCLOPRAMIDE HYDROCHLORIDE 10 MG: 5 SOLUTION ORAL at 13:09

## 2023-12-07 RX ADMIN — Medication 40 MG: at 06:36

## 2023-12-07 RX ADMIN — MICONAZOLE NITRATE: 2 POWDER TOPICAL at 08:46

## 2023-12-07 ASSESSMENT — ACTIVITIES OF DAILY LIVING (ADL)
ADLS_ACUITY_SCORE: 63
ADLS_ACUITY_SCORE: 67
ADLS_ACUITY_SCORE: 63
ADLS_ACUITY_SCORE: 67

## 2023-12-07 NOTE — PROVIDER NOTIFICATION
"Provider Notification    Notified Person:  Manish Motta MD messaged via [employer] messaging at 0900    Purpose of notification: \"pt IV showed early signs of infiltration with about 5 mins of vanco running. IV removed & will apply warm compress. Please advise if additional measures needed     Orders received: MD replied & is aware    Comments: Order Vascular access consult--pt required US placement on Tuesday      "

## 2023-12-07 NOTE — PLAN OF CARE
Goal Outcome Evaluation:      Plan of Care Reviewed With: patient    Overall Patient Progress: no changeOverall Patient Progress: no change       Cognitive/Mentation: Alert. HECTOR orientation  Neuros/CMS: Intact ex mute, RLE & BLE weakness/spacisity   VS: stable.   GI: Last BM 12/7/23. Incontinent.  : incontinent. Does not tolerate external cath-- pulls it off consistently. Redness on perineum.   Pulmonary: diminished at bases  Pain: FLAAC 0.      Skin: coccyx red with concerns for pressure wounds at the areas that are peeling skin--mepliex in place, Activity: Assist x 2 with lift.  Diet: NPO TF. Takes pills G tube.      Therapies recs: not following  Discharge: Tomorrow afternoon via stretcher     Aggression Stoplight Tool: yellow     End of shift summary: vanco infiltrated--see prior note; IV replaced

## 2023-12-07 NOTE — PROGRESS NOTES
Care Management Follow Up    Length of Stay (days): 8    Expected Discharge Date: 12/08/2023     Concerns to be Addressed:       Patient plan of care discussed at interdisciplinary rounds: Yes    Anticipated Discharge Disposition: Home, Home Care     Anticipated Discharge Services:    Anticipated Discharge DME:      Patient/family educated on Medicare website which has current facility and service quality ratings:    Education Provided on the Discharge Plan:    Patient/Family in Agreement with the Plan:      Referrals Placed by CM/SW: Transportation  Private pay costs discussed: Not applicable    Additional Information:  Discharge to home anticipated tomorrow.  Stretcher transport rescheduled for 12/8 1738-4901.  Updated patient's mother with time.    Courtney Brown RN, BSN, PHN  Inpatient Care Coordination  [facility]  Phone: [phone]

## 2023-12-07 NOTE — PROGRESS NOTES
Called pharmacist regarding restarting vanco later d/t IV infiltration. Per pharmacist--retime future vanco doses to 0900 & 2100

## 2023-12-07 NOTE — PLAN OF CARE
Pt here with recurrent pneumonia. nonverbal. Neuros are R spasticity, RUE 1/5, LUE 4/5, BLE 1/5. VSS. NPO, PEG tube in place, running at 50 ml with 10 ml FWF q4grs.  Takes pills via  GJ tube. Up with A2/lift. Denies pain. Pt scoring green on the Aggression Stop Light Tool. Plan is to discharge home with mother 12/9/23. Discharge pending.

## 2023-12-07 NOTE — PROGRESS NOTES
[facility]  Hospitalist Progress Note  Manish Motta MD  12/07/2023    Assessment & Plan   Keyon Farias is a 63 year old male with PMH significant for Hx of TBI with aphasia, R sided spastic hemiplegia, chronic hemiplegia and chronic dysphagia with GJ-tube for TFs/meds, seizure disorder ,  panhypopituitarism and hx of prior hospitalizations for recurrent pneumonia.      Recently he was admitted in  8/2023, then 9/2023 at which time he was noted with septic shock from pneumonia and required intubation that hospital stay. Respiratory cultures grew MR SA and Pseudomonas and he was treated with IV vancomycin and Zosyn and then changed to ciprofloxacin. He then got admitted again 10/25/23, treated for COVID pneumonia with Remdesivir and dexamethasone.      He then has had three ED visits in past four days (on 11/26, 11/28 and then 11/29) for bronchitis and has not been able to get ciprofloxacin as prescribed by the ED physician. His mother notes that he has been very somnolent, was noted hypotensive to SBP 80s which responded to increased fluids from feeding tube. He was also noted with low grade fever.     Likely early sepsis-resolved likely due to  MRSA pneumonia  Positive blood culture likely  1/2 staph hominis and staph hemolyticus and likley contaminant  Somnolence, metabolic encephalopathy likely secondary to above -fracture  Recent h/o COVID (10/25/2023), likely COVID recovered  recent hospitalization for septic shock from pneumonia requiring intubation (9/2023)  -Has hx of several prior hospitalizations for aspiration pneumonitis with recent hospitalizations and initial presentation as noted above  --last hospitalization when he was discharged on 10/28/2023 and during that hospitalization his respiratory cultures grew MRSA and Pseudomonas and patient was being treated with vancomycin and Zosyn and he was discharged on ciprofloxacin and completed the course  -On admission presented  with tachycardia, lactic acid was 1.8, WBC count was 13.9 with hemoglobin of 14.3 and platelet count of 218  -His COVID-19 has been persistently positive on admission and he tested negative for influenza and RSV  -Chest x-ray done on 11/29 showed slightly worsening of the right basilar opacity likely due to worsening infection or atelectasis, left lung is clear and elevated right hemidiaphragm with no evidence of pleural effusion or pneumothorax  -On admit patient was started on IV fluids, vancomycin and levofloxacin and was being monitored in the hospital and he has always been on room air and his clinical condition has shown significant improvement with improvement in blood pressure.  And IV fluids were discontinued on 12/1 and he did receive stress dose steroids for 1 day  -MRSA swab is positive  -Blood cultures 1/2 done on 11/29 is positive for staph hominis and staph hemolyticus and likley contaminant and vancomycin was discontinued on (12/1 )   - given change in his clinical status with him being less communicative  restarted  vancomycin and infectious disease team has been consulted. ID consult appreciated  -Blood cultures likely contaminants  -Appreciate input from infectious disease team and will complete 7 - 10 day course of antibiotics and continue with vancomycin and Levaquin.         Mildly elevated creatinine-resolved  -Of note his potassium was within normal limits at 3.4 and creatinine was 1.14 and baseline creatinine is around was 0.8-1.1  -Creatinine  is normal at 0.71     Spastic hemiplegia  Traumatic brain injury  Nonverbal  Chronic right-sided paresis  Chronic dysphagia, s/p GJ tube  Clogged J tube  - Bed-bound and essentially nonverbal at baseline. Takes meds via G-tube. 5 cans Jevity/d via [employer].  - Chronic and stable on home meds, including Tegretol and Brivaracetam and continue the same  -Nutrition team is consulted and he is started on tube feeds and continue with free water      Mild  hypernatremia and mild hyperchloremia - resolved  -This can be likely due to recent use of IV fluids and he is already receiving free water       Panhypopituitarism  Hypothyroidism  - Chronic and stable on home meds, including hydrocortisone and Synthroid   -He was given stress dose steroids for 1 day and continue with PTA home dose of hydrocortisone at 15 mg in the morning and  7.5 in the evening and continue with PTA Synthroid     GERD  - Chronic and stable on PPI     Rash/itchiness on the right arm  -As per mom patient has been scratching his arm and she puts moisturizing/drying next cream and on exam does not look infected and will continue with local cares        Hypophosphatemia  -Phosphorus level this morning is 2.3 and we will continue the patient with replacement     Chronic anemia  -His hemoglobin has been fluctuating and baseline is difficult to assess as patient has been admitted multiple times recently but seems to be around 12 as per review of the EMR  -Hemoglobin on 12/1/2023 was 11.8 and we will continue to monitor and there is no evidence of any bleeding     Diet: NPO for Medical/Clinical Reasons Except for: No Exceptions  Adult Formula Drip Feeding: Continuous Jevity 1.5; Jejunostomy; Goal Rate: 50; mL/hr; Hold TF for 1 hour prior to and post levothyroxine. Initiate TF @ 20 ml/hr for 24 hours, advance by 10 ml q 12 hours until reach goal, pending tolerance and labs...    DVT Prophylaxis: enoxaparin (Lovenox) SQ  Lerma Catheter: Not present  Lines: None     Cardiac Monitoring: None  Code Status: Full Code          Clinically Significant Risk Factors          # Hypernatremia: Highest Na = 147 mmol/L in last 2 days, will monitor as appropriate                     # Financial/Environmental Concerns: none          Disposition Plan     Expected Discharge Date:   -- anticipate on 12/8    Disposition:   -- back to home on 12/8    Interval History   -- noted some skin reaction near vanco infusion site  --   patient is alert and unchanged    -Data reviewed today: I reviewed all new labs and imaging over the last 24 hours. I personally reviewed no images or EKG's today.    Physical Exam    , Blood pressure 120/54, pulse 62, temperature 97.5  F (36.4  C), temperature source Axillary, resp. rate 20, weight 69.8 kg (153 lb 14.1 oz), SpO2 98%.  Vitals:    12/01/23 0420   Weight: 69.8 kg (153 lb 14.1 oz)     Vital Signs with Ranges  Temp:  [97.5  F (36.4  C)-98.6  F (37  C)] 97.5  F (36.4  C)  Pulse:  [53-65] 62  Resp:  [18-20] 20  BP: (108-131)/(54-63) 120/54  SpO2:  [98 %-100 %] 98 %  I/O's Last 24 hours  I/O last 3 completed shifts:  In: 1200 [NG/GT:1200]  Out: -     Constitutional:  no apparent distress  Respiratory: Clear to auscultation bilaterally, no crackles or wheezing  Cardiovascular: Regular rate and rhythm, normal S1 and S2   GI: Normal bowel sounds, soft, non-distended, non-tender  Skin/Integumen: No rashes, no cyanosis, no edema  Other: hemiplegic     Medications   All medications were reviewed.   dextrose        Brivaracetam  100 mg Oral or Feeding Tube BID    carBAMazepine  100 mg Per Feeding Tube Daily    carBAMazepine  150 mg Oral or Feeding Tube TID    enoxaparin ANTICOAGULANT  40 mg Subcutaneous Q24H    hydrocortisone  7.5 mg Oral or Feeding Tube Daily    hydrocortisone  15 mg Oral or Feeding Tube Daily    levofloxacin  750 mg Intravenous Q24H    levothyroxine  125 mcg Oral or Feeding Tube Daily    metoclopramide  10 mg Oral or Feeding Tube 4x Daily AC & HS    miconazole   Topical BID    pantoprazole  40 mg Per Feeding Tube QAM AC    sodium bicarbonate  650 mg Per Feeding Tube BID    sodium chloride (PF)  3 mL Intracatheter Q8H    vancomycin  750 mg Intravenous Q12H        Data   Recent Labs   Lab 12/06/23  2131 12/06/23  1533 12/05/23  1109 12/03/23  0727 12/02/23  1147 12/02/23  0509 12/01/23  1656 12/01/23  1024   WBC  --   --   --   --  5.6  --   --  8.6   HGB  --   --   --   --  11.8*  --   --  11.8*    MCV  --   --   --   --  92  --   --  93   PLT  --   --   --   --  125*  --   --  147*   NA  --   --   --  141  --  147*  --  144   POTASSIUM  --  4.7 4.2 3.7  --  3.5  --  3.6   CHLORIDE  --   --   --  106  --  113*  --  111*   CO2  --   --   --  27  --  25  --  22   BUN  --   --   --  10.6  --  8.9  --  8.4   CR  --   --  0.78 0.68  --  0.71  --  0.79   ANIONGAP  --   --   --  8  --  9  --  11   STEVE  --   --   --  8.0*  --  7.9*  --  8.0*   *  --   --  95  --  103*   < > 122*    < > = values in this interval not displayed.       No results found for this or any previous visit (from the past 24 hour(s)).    Manish Motta MD  Text Page  (7am to 6pm)

## 2023-12-07 NOTE — PROGRESS NOTES
CLINICAL NUTRITION SERVICES - REASSESSMENT NOTE      Recommendations Ordered by Registered Dietitian (RD):     Will increase TF rate to better meet calorie needs-  Type of Feeding Tube: GJ (feeding into J-port)  Enteral Frequency:  Continuous  Enteral Regimen: Jevity 1.5 @ 55 mL/hr x 22 hrs (hold for 1 hr before and 1 hr after levothyroxine dose)  Total Enteral Provisions: 1815 cals (26 cals/kg), 77 gm pro (1.1 gm/kg), 25 gm fiber, 920 mL free water  Free Water Flush: 100 mL every 4 hrs (12/2 - per MD)       Malnutrition:   % Weight Loss:  Unable to assess accurate wt  - records reflect trends of wt loss/gain  % Intake:  No decreased intake noted - pt receives 100% nutrition via FT  Subcutaneous Fat Loss:  None observed  Muscle Loss:  Temporal region - mild depletion and Clavicle bone region - mild depletion  Fluid Retention:  None noted    Malnutrition Diagnosis: Patient does not meet two of the above criteria necessary for diagnosing malnutrition       EVALUATION OF PROGRESS TOWARD GOALS   Diet:    NPO    Nutrition Support:    Type of Feeding Tube: Gastrojejunostomy   Enteral Frequency:  Continuous   Enteral Regimen: Jevity 1.5 @ 50 ml/hr x 22 hrs (hold for 1 hour before/after levothyroxine dose)  Total Enteral Provisions: 1100ml/day, 1650 kcals (24 cals/kg), 70 g PRO (1 gm/kg), 836 ml free H20, 238 g CHO, and 23 g fiber daily.  Free Water Flush: 100 ml q 4 hours (12/2 - MD order)    Intake/Tolerance:    Chart reviewed  Tolerating TF at goal rate  RN in room - confirms EN is going into J-port  BM x1      ASSESSED NUTRITION NEEDS:  Dosing Weight 69.8 kg (12/1)   Estimated Energy Needs: 7715-4447 kcals (25-30 Kcal/Kg)   Estimated Protein Needs: 55-70 grams protein (0.8-1 g pro/Kg)      NEW FINDINGS:     Vitals:    12/01/23 0420   Weight: 69.8 kg (153 lb 14.1 oz)     Wt Readings from Last 10 Encounters:   12/01/23 69.8 kg (153 lb 14.1 oz)   11/26/23 74.8 kg (165 lb)   11/07/23 74.8 kg (165 lb)   10/26/23 70.8 kg (156  lb 1.4 oz)   10/04/23 83.9 kg (185 lb)   10/03/23 84.2 kg (185 lb 10 oz)   09/21/23 77.1 kg (170 lb)   09/12/23 76.8 kg (169 lb 5 oz)   08/12/23 78.4 kg (172 lb 13.5 oz)   05/10/23 72.6 kg (160 lb)     Large wt fluctuations - difficult to assess true wt loss (pt receiving nutrition via FT)    Previous Goals (11/30):   TF to reach goal within 48 hrs   Evaluation: Met    TF to provide % of estimated nutritional needs per DW 74.8 kg, a minimum of 20 kcal/kg and 0.8 g/k protein.  Evaluation: Met    Previous Nutrition Diagnosis (11/30):   Inadequate oral intake related to NPO status as evidenced by need for EN support to meet pt nutrition needs.   Evaluation: Completed      MALNUTRITION  % Weight Loss:  Unable to assess accurate wt  - records reflect trends of wt loss/gain  % Intake:  No decreased intake noted - pt receives 100% nutrition via FT  Subcutaneous Fat Loss:  None observed  Muscle Loss:  Temporal region - mild depletion and Clavicle bone region - mild depletion  Fluid Retention:  None noted    Malnutrition Diagnosis: Patient does not meet two of the above criteria necessary for diagnosing malnutrition      CURRENT NUTRITION DIAGNOSIS  No nutrition diagnosis identified at this time     INTERVENTIONS  Recommendations / Nutrition Prescription  NPO    Will increase TF rate to better meet calorie needs-  Type of Feeding Tube: GJ (feeding into J-port)  Enteral Frequency:  Continuous  Enteral Regimen: Jevity 1.5 @ 55 mL/hr x 22 hrs (hold for 1 hr before and 1 hr after levothyroxine dose)  Total Enteral Provisions: 1815 cals (26 cals/kg), 77 gm pro (1.1 gm/kg), 25 gm fiber, 920 mL free water  Free Water Flush: 100 mL every 4 hrs (12/2 - per MD)      Goals  EN to meet % estimated needs      MONITORING AND EVALUATION:  Progress towards goals will be monitored and evaluated per protocol and Practice Guidelines

## 2023-12-08 VITALS
RESPIRATION RATE: 14 BRPM | TEMPERATURE: 97.7 F | HEART RATE: 64 BPM | DIASTOLIC BLOOD PRESSURE: 51 MMHG | SYSTOLIC BLOOD PRESSURE: 116 MMHG | OXYGEN SATURATION: 100 % | BODY MASS INDEX: 20.87 KG/M2 | WEIGHT: 153.88 LBS

## 2023-12-08 DIAGNOSIS — Z09 HOSPITAL DISCHARGE FOLLOW-UP: ICD-10-CM

## 2023-12-08 PROCEDURE — 250N000011 HC RX IP 250 OP 636

## 2023-12-08 PROCEDURE — 258N000003 HC RX IP 258 OP 636

## 2023-12-08 PROCEDURE — 250N000013 HC RX MED GY IP 250 OP 250 PS 637: Performed by: HOSPITALIST

## 2023-12-08 PROCEDURE — 99239 HOSP IP/OBS DSCHRG MGMT >30: CPT | Performed by: INTERNAL MEDICINE

## 2023-12-08 RX ADMIN — MICONAZOLE NITRATE: 2 POWDER TOPICAL at 09:09

## 2023-12-08 RX ADMIN — CARBAMAZEPINE 150 MG: 100 SUSPENSION ORAL at 06:47

## 2023-12-08 RX ADMIN — SODIUM BICARBONATE 650 MG TABLET 650 MG: at 08:54

## 2023-12-08 RX ADMIN — BRIVARACETAM 100 MG: 10 SOLUTION ORAL at 11:07

## 2023-12-08 RX ADMIN — METOCLOPRAMIDE HYDROCHLORIDE 10 MG: 5 SOLUTION ORAL at 12:01

## 2023-12-08 RX ADMIN — CARBAMAZEPINE 150 MG: 100 SUSPENSION ORAL at 12:01

## 2023-12-08 RX ADMIN — HYDROCORTISONE 15 MG: 10 TABLET ORAL at 08:54

## 2023-12-08 RX ADMIN — CARBAMAZEPINE 150 MG: 100 SUSPENSION ORAL at 01:11

## 2023-12-08 RX ADMIN — Medication 40 MG: at 06:47

## 2023-12-08 RX ADMIN — VANCOMYCIN HYDROCHLORIDE 750 MG: 500 INJECTION, POWDER, LYOPHILIZED, FOR SOLUTION INTRAVENOUS at 11:24

## 2023-12-08 RX ADMIN — LEVOTHYROXINE SODIUM 125 MCG: 100 TABLET ORAL at 06:47

## 2023-12-08 RX ADMIN — METOCLOPRAMIDE HYDROCHLORIDE 10 MG: 5 SOLUTION ORAL at 08:54

## 2023-12-08 ASSESSMENT — ACTIVITIES OF DAILY LIVING (ADL)
ADLS_ACUITY_SCORE: 67
ADLS_ACUITY_SCORE: 63

## 2023-12-08 NOTE — PROGRESS NOTES
Patient is non verbal and incapable of understanding discharge orders.  Family member did not want to be present for discharge instructions. Discharge instructions sent home with patient.  All personal belongings accounted for.

## 2023-12-08 NOTE — PROGRESS NOTES
Care Management Discharge Note    Discharge Date: 12/08/2023       Discharge Disposition: Home, Home Care    Discharge Services:      Discharge DME:      Discharge Transportation:      Private pay costs discussed: Not applicable    Does the patient's insurance plan have a 3 day qualifying hospital stay waiver?  No    PAS Confirmation Code:    Patient/family educated on Medicare website which has current facility and service quality ratings:      Education Provided on the Discharge Plan:    Persons Notified of Discharge Plans: LM w/ Guardian/ MD/ charge RN  Patient/Family in Agreement with the Plan:      Handoff Referral Completed: Yes    Additional Information:  Pt discharge back home with resumption of HHC  Ride arranged with [employer] [phone].  PCS on chart/faxed  Updated [facility] that Pt is discharged. Orders for resumption/sent via DOD.    Left message with Guardian/Mom that Pt is discharge today (she was updated yesterday per notes)    Bedside RN to review AVS.     Alee Sousa RN

## 2023-12-08 NOTE — PLAN OF CARE
Pt here with recurrent pneumonia. nonverbal. Neuros are R spasticity,  RUE 1/5, LUE 4/5, BLE 1/5. VSS. NPO, PEG tube in place, running at 55 ml with 100 ml FWF q4grs. Takes pills via  GJ tube. Up with A2/lift. Denies pain. Pt scoring green on the Aggression Stop Light Tool. Plan is to discharge home with mother this afternoon via stretcher Discharge pending.

## 2023-12-08 NOTE — DISCHARGE SUMMARY
[facility]  Hospitalist Discharge Summary      Date of Admission:  11/29/2023  Date of Discharge:  12/8/2023  Discharging Provider: Manish Motta MD  Discharge Service: Hospitalist Service    Discharge Diagnoses     Likely early sepsis-resolved likely due to  MRSA pneumonia  Positive blood culture likely  1/2 staph hominis and staph hemolyticus and likley contaminant  Somnolence, metabolic encephalopathy likely secondary to above -fracture  Recent h/o COVID (10/25/2023), likely COVID recovered  recent hospitalization for septic shock from pneumonia requiring intubation (9/2023)    Mildly elevated creatinine-resolved    Spastic hemiplegia  Traumatic brain injury  Nonverbal  Chronic right-sided paresis  Chronic dysphagia, s/p GJ tube  Clogged J tube    Mild hypernatremia and mild hyperchloremia - resolved    Panhypopituitarism  Hypothyroidism    GERD    Rash/itchiness on the right arm    Hypophosphatemia    Chronic anemia      Unresulted Labs Ordered in the Past 30 Days of this Admission       No orders found from 10/30/2023 to 11/30/2023.        These results will be followed up by PCP    Discharge Disposition   Discharged to home  Condition at discharge: Stable    Hospital Course   Keyon Farias is a 63 year old male with PMH significant for Hx of TBI with aphasia, R sided spastic hemiplegia, chronic hemiplegia and chronic dysphagia with GJ-tube for TFs/meds, seizure disorder ,  panhypopituitarism and hx of prior hospitalizations for recurrent pneumonia.      Recently he was admitted in  8/2023, then 9/2023 at which time he was noted with septic shock from pneumonia and required intubation that hospital stay. Respiratory cultures grew MR  and Pseudomonas and he was treated with IV vancomycin and Zosyn and then changed to ciprofloxacin. He then got admitted again 10/25/23, treated for COVID pneumonia with Remdesivir and dexamethasone.      He then has had three ED visits in past four  days (on 11/26, 11/28 and then 11/29) for bronchitis and has not been able to get ciprofloxacin as prescribed by the ED physician. His mother notes that he has been very somnolent, was noted hypotensive to SBP 80s which responded to increased fluids from feeding tube. He was also noted with low grade fever.     Likely early sepsis-resolved likely due to  MRSA pneumonia  Positive blood culture likely  1/2 staph hominis and staph hemolyticus and likley contaminant  Somnolence, metabolic encephalopathy likely secondary to above -fracture  Recent h/o COVID (10/25/2023), likely COVID recovered  recent hospitalization for septic shock from pneumonia requiring intubation (9/2023)  -Has hx of several prior hospitalizations for aspiration pneumonitis with recent hospitalizations and initial presentation as noted above  --last hospitalization when he was discharged on 10/28/2023 and during that hospitalization his respiratory cultures grew MRSA and Pseudomonas and patient was being treated with vancomycin and Zosyn and he was discharged on ciprofloxacin and completed the course  -On admission presented with tachycardia, lactic acid was 1.8, WBC count was 13.9 with hemoglobin of 14.3 and platelet count of 218  -His COVID-19 has been persistently positive on admission and he tested negative for influenza and RSV  -Chest x-ray done on 11/29 showed slightly worsening of the right basilar opacity likely due to worsening infection or atelectasis, left lung is clear and elevated right hemidiaphragm with no evidence of pleural effusion or pneumothorax  -On admit patient was started on IV fluids, vancomycin and levofloxacin and was being monitored in the hospital and he has always been on room air and his clinical condition has shown significant improvement with improvement in blood pressure.  And IV fluids were discontinued on 12/1 and he did receive stress dose steroids for 1 day  -MRSA swab is positive  -Blood cultures 1/2 done on  11/29 is positive for staph hominis and staph hemolyticus and likley contaminant and vancomycin was discontinued on (12/1 )   - given change in his clinical status with him being less communicative  restarted  vancomycin and infectious disease team has been consulted. ID consult appreciated  -Blood cultures likely contaminants  -Appreciate input from infectious disease team and will complete 7 - 10 day course of antibiotics and continue with vancomycin and Levaquin.      Mildly elevated creatinine-resolved  -Of note his potassium was within normal limits at 3.4 and creatinine was 1.14 and baseline creatinine is around was 0.8-1.1  -Creatinine  is normal at 0.71     Spastic hemiplegia  Traumatic brain injury  Nonverbal  Chronic right-sided paresis  Chronic dysphagia, s/p GJ tube  Clogged J tube  - Bed-bound and essentially nonverbal at baseline. Takes meds via G-tube. 5 cans Jevity/d via Gtube.  - Chronic and stable on home meds, including Tegretol and Brivaracetam and continue the same  -Nutrition team is consulted and he is started on tube feeds and continue with free water      Mild hypernatremia and mild hyperchloremia - resolved  -This can be likely due to recent use of IV fluids and he is already receiving free water       Panhypopituitarism  Hypothyroidism  - Chronic and stable on home meds, including hydrocortisone and Synthroid   -He was given stress dose steroids for 1 day and continue with PTA home dose of hydrocortisone at 15 mg in the morning and  7.5 in the evening and continue with PTA Synthroid     GERD  - Chronic and stable on PPI     Rash/itchiness on the right arm  -As per mom patient has been scratching his arm and she puts moisturizing/drying next cream and on exam does not look infected and will continue with local cares     Hypophosphatemia  -Phosphorus level this morning is 2.3 and we will continue the patient with replacement     Chronic anemia  -His hemoglobin has been fluctuating and  baseline is difficult to assess as patient has been admitted multiple times recently but seems to be around 12 as per review of the EMR  -Hemoglobin on 12/1/2023 was 11.8 and we will continue to monitor and there is no evidence of any bleeding     Diet: NPO for Medical/Clinical Reasons Except for: No Exceptions  Adult Formula Drip Feeding: Continuous Jevity 1.5; Jejunostomy; Goal Rate: 50; mL/hr; Hold TF for 1 hour prior to and post levothyroxine. Initiate TF @ 20 ml/hr for 24 hours, advance by 10 ml q 12 hours until reach goal, pending tolerance and labs...    DVT Prophylaxis: enoxaparin (Lovenox) SQ  Lerma Catheter: Not present  Lines: None     Cardiac Monitoring: None  Code Status: Full Code          Clinically Significant Risk Factors          # Hypernatremia: Highest Na = 147 mmol/L in last 2 days, will monitor as appropriate                     # Financial/Environmental Concerns: none          Disposition Plan     Expected Discharge Date:   -- anticipate on 12/8    Disposition:   -- back to home on 12/8    Consultations This Hospital Stay   NUTRITION SERVICES ADULT IP CONSULT  PHARMACY TO DOSE VANCO  CARE MANAGEMENT / SOCIAL WORK IP CONSULT  PHARMACY IP CONSULT  VASCULAR ACCESS ADULT IP CONSULT  INFECTIOUS DISEASES IP CONSULT  INFECTIOUS DISEASES IP CONSULT  PHARMACY TO DOSE VANCO  VASCULAR ACCESS ADULT IP CONSULT  WOUND OSTOMY CONTINENCE NURSE  IP CONSULT  VASCULAR ACCESS ADULT IP CONSULT    Code Status   Full Code    Time Spent on this Encounter   I, Manish Motta MD, personally saw the patient today and spent greater than 30 minutes discharging this patient.       Manish Motta MD  [facility] NEUROSCIENCE UNIT  [facility] [address] [place] [address]  Phone: [phone]  ______________________________________________________________________    Physical Exam   Vital Signs: Temp: 97.7  F (36.5  C) Temp src: Oral BP: 116/51 Pulse: 64   Resp: 14 SpO2: 100 % O2 Device: None (Room air)     Weight: 153 lbs 14.1 oz         Primary Care Physician   Elsa Queen    Discharge Orders      Reason for your hospital stay    You were admitted with complicated pneumonia     Follow-up and recommended labs and tests     Follow up with PCP as needed     Activity    Your activity upon discharge: activity as tolerated     Resume Home Care Services     Diet    Follow this diet upon discharge: Orders Placed This Encounter      Adult Formula Drip Feeding: Continuous Jevity 1.5; Jejunostomy; Goal Rate: 55; mL/hr; Increase TF rate to 55 mL/hr (Hold TF for 1 hour prior to and post levothyroxine); Do not advance tube feeding rate unless K+ is = or > 3.0, Mg++ is = or > 1.5, ...      NPO for Medical/Clinical Reasons Except for: No Exceptions       Significant Results and Procedures   Most Recent 3 CBC's:  Recent Labs   Lab Test 12/02/23  1147 12/01/23  1024 11/29/23  1510   WBC 5.6 8.6 13.9*   HGB 11.8* 11.8* 14.3   MCV 92 93 91   * 147* 218     Most Recent 3 BMP's:  Recent Labs   Lab Test 12/07/23  1518 12/06/23  2131 12/06/23  1533 12/05/23  1109 12/03/23  0727 12/02/23  0509 12/01/23  1656 12/01/23  1024   NA  --   --   --   --  141 147*  --  144   POTASSIUM 4.5  --  4.7 4.2 3.7 3.5  --  3.6   CHLORIDE  --   --   --   --  106 113*  --  111*   CO2  --   --   --   --  27 25  --  22   BUN  --   --   --   --  10.6 8.9  --  8.4   CR  --   --   --  0.78 0.68 0.71  --  0.79   ANIONGAP  --   --   --   --  8 9  --  11   STEVE  --   --   --   --  8.0* 7.9*  --  8.0*   GLC  --  114*  --   --  95 103*   < > 122*    < > = values in this interval not displayed.   ,   Results for orders placed or performed during the hospital encounter of 11/29/23   XR Chest Port 1 View    Narrative    XR CHEST PORT 1 VIEW 11/29/2023 3:48 PM    HISTORY: concern for pna    COMPARISON: 11/28/2023      Impression    IMPRESSION: Slight worsening of right basilar opacities, likely due to  worsening infection or atelectasis. The left lung is clear.  Elevated  right hemidiaphragm. No pleural effusion or pneumothorax. Normal heart  size.    JULIÁN MURO MD         SYSTEM ID:  [id number]     *Note: Due to a large number of results and/or encounters for the requested time period, some results have not been displayed. A complete set of results can be found in Results Review.       Discharge Medications   Current Discharge Medication List        CONTINUE these medications which have CHANGED    Details   miconazole (MICATIN) 2 % external powder Apply topically 2 times daily as needed    Associated Diagnoses: Recurrent pneumonia           CONTINUE these medications which have NOT CHANGED    Details   acetaminophen (TYLENOL) 325 MG tablet Take 650 mg by mouth every 6 hours as needed for mild pain      bacitracin 500 UNIT/GM external ointment Apply topically daily as needed for wound care To PEG site.  Qty: 30 g, Refills: 3    Associated Diagnoses: G tube feedings (H)      Brivaracetam (BRIVIACT) 10 MG/ML solution 100 mg by Oral or Feeding Tube route 2 times daily 0900, 2100      !! 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 carBAMazepine (TEGRETOL) 100 MG/5ML suspension 100 mg by Per Feeding Tube route daily Take at 1800      COMPOUNDED NON-CONTROLLED SUBSTANCE (CMPD RX) - PHARMACY TO MIX COMPOUNDED MEDICATION Scopolamine 0.4mg capsules - take  2 capsule by feeding tube three times daily as needed  Qty: 90 capsule, Refills: 1    Associated Diagnoses: Excessive oral secretions      Cyanocobalamin (VITAMIN B-12 PO) 2,500 mcg by Per Feeding Tube route daily      glycopyrrolate (ROBINUL) 1 MG tablet Take 1 tablet (1 mg) by mouth 2 times daily as needed (secretions)  Qty: 180 tablet, Refills: 1    Associated Diagnoses: Excessive oral secretions       guaiFENesin (MUCINEX) 600 MG 12 hr tablet Take 1 tablet (600 mg) by mouth 2 times daily as needed for congestion  Qty: 60 tablet, Refills: 0    Associated Diagnoses: Aspiration pneumonitis (H)      hydrocortisone (CORTAID) 1 % external cream Apply topically 2 times daily as needed Apply to reddened memo areas as needed      hydrocortisone (CORTEF) 5 MG tablet Take 15 mg (3 tablets) in the morning and 7.5 mg (1.5 tablet)  at 2:00 PM. During illness patient takes more as a stress dose. Please increase the dose as directed.  Qty: 400 tablet, Refills: 3    Associated Diagnoses: Secondary adrenal insufficiency (H24)      levothyroxine (SYNTHROID/LEVOTHROID) 125 MCG tablet Take 1 tablet (125 mcg) by mouth daily  Qty: 90 tablet, Refills: 0    Associated Diagnoses: Hypothyroidism, unspecified type      metoclopramide (REGLAN) 10 MG/10ML SOLN solution Take 10 mLs (10 mg) by mouth 4 times daily (before meals and nightly) 0800, 1200, 1600, 2000 Disconnects bag before administration, then waits 45 mins before reconnecting after giving the medication  Qty: 2365 mL, Refills: 5    Associated Diagnoses: Aspiration pneumonitis (H)      multivitamin, therapeutic (THERA-VIT) TABS tablet 1 tablet by Per Feeding Tube route daily      mupirocin (BACTROBAN) 2 % external ointment APPLY TOPICALLY TO THE AFFECTED AREA TWICE DAILY AS NEEDED  Qty: 30 g, Refills: 1    Associated Diagnoses: Panhypopituitarism (H24); Hypogonadism male      pantoprazole (PROTONIX) 2 mg/mL SUSP suspension TAKE 20ML PER FEEDING TUBE DAILY  Qty: 600 mL, Refills: 3    Associated Diagnoses: Gastroesophageal reflux disease      sodium bicarbonate 650 MG tablet TAKE 1 TABLET(650 MG) BY MOUTH TWICE DAILY  Qty: 180 tablet, Refills: 3    Associated Diagnoses: Encephalopathy      testosterone cypionate (DEPOTESTOSTERONE) 200 MG/ML injection Inject 0.25 mLs (50 mg) into the muscle once a week  Qty: 4 mL, Refills: 3    Associated Diagnoses: Panhypopituitarism (H24);  "Hypogonadism male      vitamin C (ASCORBIC ACID) 1000 MG TABS 1,000 mg by Oral or Feeding Tube route daily       vitamin D3 (CHOLECALCIFEROL) 2000 units (50 mcg) tablet 2,000 Units by Per Feeding Tube route daily      albuterol (PROVENTIL) (5 MG/ML) 0.5% neb solution Take 0.5 mLs (2.5 mg) by nebulization every 6 hours as needed for shortness of breath, wheezing or cough 0700 1100 1500 1900 with mucomyst  Qty: 240 mL, Refills: 0    Associated Diagnoses: Aspiration pneumonitis (H)      insulin syringe-needle U-100 (29G X 1/2\" 1 ML) 29G X 1/2\" 1 ML miscellaneous Syringe needle use as needed  Qty: 200 each, Refills: 11    Associated Diagnoses: Panhypopituitarism (H24)       !! - Potential duplicate medications found. Please discuss with provider.        Allergies   Allergies   Allergen Reactions    Valproic Acid Other (See Comments)     Toxicity w/ bone marrow suspension, elevated ammonia levels     Dilantin [Phenytoin Sodium] Other (See Comments)     Severe Trembling    Scopolamine Hives     Hives with the patch - oral no problem     "

## 2023-12-11 ENCOUNTER — PATIENT OUTREACH (OUTPATIENT)
Dept: CARE COORDINATION | Facility: CLINIC | Age: 63
End: 2023-12-11
Payer: MEDICARE

## 2023-12-11 ENCOUNTER — TELEPHONE (OUTPATIENT)
Dept: FAMILY MEDICINE | Facility: CLINIC | Age: 63
End: 2023-12-11
Payer: MEDICARE

## 2023-12-11 NOTE — TELEPHONE ENCOUNTER
Home Care is calling regarding an established patient with [facility].        9/21/2023     8:14 AM   Home Care Information   Date of Home Care episode start 9/15/2023   Current following provider Elsa Queen   Date provider agreed to follow 9/15/2023    Name/Phone Number NANCI Anderson #[phone]   Home Care agency [facility]     Requesting orders from: Elsa Queen  Provider is following patient: Yes  Is this a 60-day recertification request?  No    Orders Requested    Skilled Nursing  Request for delay in care, service is not able to be provided within same scheduled day.   Patient was supposed to be seen today, but was not scheduled. Skilled nursing will visit tomorrow, 12/12.      Confirmed ok to leave a detailed message with call back.  Contact information confirmed and updated as needed.    Mayela Shabazz RN

## 2023-12-11 NOTE — PROGRESS NOTES
Clinic Care Coordination Contact  [facility]/Voicemail    Clinical Data: Care Coordinator Outreach    Outreach Documentation Number of Outreach Attempt   12/11/2023   9:44 AM 1       Left message on patient's mothers voicemail with call back information and requested return call.    Plan: Care Coordinator will try to reach patient again in 1-2 business days.     Heather Hong RN, BSN, PHN  Primary Care / Care Coordinator   [facility]  E-mail [email or web address]   [phone]

## 2023-12-12 ENCOUNTER — TELEPHONE (OUTPATIENT)
Dept: FAMILY MEDICINE | Facility: CLINIC | Age: 63
End: 2023-12-12
Payer: MEDICARE

## 2023-12-12 ENCOUNTER — PATIENT OUTREACH (OUTPATIENT)
Dept: CARE COORDINATION | Facility: CLINIC | Age: 63
End: 2023-12-12
Payer: MEDICARE

## 2023-12-12 NOTE — LETTER
December 12, 2023      Keyon Farias  [address] [place] [address]        Dear Keyon,     Your hospital team has recommended you schedule a Medication Therapy Management (MTM) appointment. MTM is designed to help you get the most of out of your medicines.     During an MTM appointment a specially trained pharmacist will review all of your medicines, both prescription and over-the-counter. They will make sure your medicines are the best choice for you and are safe and convenient for you.  MTM pharmacists work together with you and your doctor to help you understand your medicines, solve any problems related to your medicines and help you get the best results from taking your medicines.     At [facility], we strongly believe in a team approach to health care. We want to help you understand your medicines and health conditions. To learn more about how you might benefit from MTM services, check out the following website:    [email or web address]    To make an appointment, please call the clinic at [phone] or the MTM scheduling line at [phone] (toll-free at [phone]).    We look forward to hearing from you!        Raquel Dumas PharmD  Medication Therapy Management (MTM) Resident  Pager: [phone]    Julia Charles PharmD, [facility]  Medication Therapy Management Provider  [phone]

## 2023-12-12 NOTE — LETTER
[facility] CARE COORDINATION  [address] [place] [address]    December 12, 2023    Keyon GLADYS Farias  [address] [place] [address]      Dear Keyon,    I am a clinic care coordinator who works with Elsa Queen MD with the [facility]. I recently tried to call and was unable to reach you. Below is a description of clinic care coordination and how I can further assist you.       The clinic care coordination team is made up of a registered nurse, , financial resource worker and community health worker who understand the health care system. The goal of clinic care coordination is to help you manage your health and improve access to the health care system. Our team works alongside your provider to assist you in determining your health and social needs. We can help you obtain health care and community resources, providing you with necessary information and education. We can work with you through any barriers and develop a care plan that helps coordinate and strengthen the communication between you and your care team.  Our services are voluntary and are offered without charge to you personally.    Please feel free to contact me with any questions or concerns regarding care coordination and what we can offer.      We are focused on providing you with the highest-quality healthcare experience possible.    Sincerely,      Heather Hong RN, BSN, PHN  Primary Care / Care Coordinator   [facility]  E-mail [email or web address]   [phone]

## 2023-12-12 NOTE — PROGRESS NOTES
Clinic Care Coordination Contact  [facility]/Voicemail    Clinical Data: Care Coordinator Outreach    Outreach Documentation Number of Outreach Attempt   12/11/2023   9:44 AM 1   12/12/2023  10:13 AM 2       Left message on patient's mothers voicemail with call back information and requested return call.    Plan: Care Coordinator will send care coordination introduction letter with care coordinator contact information and explanation of care coordination services via mail. Care Coordinator will do no further outreaches at this time.     Heather Hong RN, BSN, PHN  Primary Care / Care Coordinator   [facility]  E-mail [email or web address]   [phone]

## 2023-12-12 NOTE — TELEPHONE ENCOUNTER
Sent referral letter.  Justin HortonD, [facility]  Medication Therapy Management Provider  [phone]

## 2023-12-12 NOTE — TELEPHONE ENCOUNTER
MTM referral from: Transitions of Care (recent hospital discharge or ED visit)    MTM referral outreach attempt #2 on December 12, 2023 at 1:48 PM      Outcome: Patient not reachable after several attempts, will route to MT Pharmacist/Provider as an FYI.  [facility] scheduling number is .  Thank you for the referral.    Use VBC for the carrier/Plan on the flowsheet      MT Practitioner please send patient letter    Twyla Vo [facility]

## 2023-12-13 DIAGNOSIS — E29.1 HYPOGONADISM MALE: ICD-10-CM

## 2023-12-13 DIAGNOSIS — E23.0 PANHYPOPITUITARISM (H): ICD-10-CM

## 2023-12-13 NOTE — TELEPHONE ENCOUNTER
FYI-  Nate called Jody with verbal approval of visit delay. She reports pt is needing a higher level of care. Home care did not resume services.  Home care has been unable to keep pt out of the hospital and they have been providing more a longterm type of care.      Per chart review, CC has tried calling pt and mailed letter explaining services that can be provided by CC.

## 2023-12-14 NOTE — TELEPHONE ENCOUNTER
TESTOSTERONE CYP 200MG/ML SDV 1ML   Last Written Prescription Date:  6/14/2023  Last Fill Quantity: 4,   # refills: 3  Last Office Visit : 2/8/2023  Future Office visit:  7/24/2024    Routing refill request to provider for review/approval because:  Drug not on the [facility], P or [facility] refill protocol or controlled substance    Yanely Kaur RN  Central Triage Red Flags/Med Refills

## 2023-12-15 RX ORDER — TESTOSTERONE CYPIONATE 200 MG/ML
50 INJECTION, SOLUTION INTRAMUSCULAR WEEKLY
Qty: 4 ML | Refills: 1 | Status: SHIPPED | OUTPATIENT
Start: 2023-12-15 | End: 2024-02-21

## 2023-12-17 ENCOUNTER — NURSE TRIAGE (OUTPATIENT)
Dept: NURSING | Facility: CLINIC | Age: 63
End: 2023-12-17
Payer: MEDICARE

## 2023-12-17 ENCOUNTER — HOSPITAL ENCOUNTER (INPATIENT)
Facility: CLINIC | Age: 63
LOS: 4 days | Discharge: HOME HEALTH | DRG: 177 | End: 2023-12-21
Attending: EMERGENCY MEDICINE | Admitting: INTERNAL MEDICINE
Payer: MEDICARE

## 2023-12-17 ENCOUNTER — APPOINTMENT (OUTPATIENT)
Dept: GENERAL RADIOLOGY | Facility: CLINIC | Age: 63
DRG: 177 | End: 2023-12-17
Attending: EMERGENCY MEDICINE
Payer: MEDICARE

## 2023-12-17 DIAGNOSIS — G81.01 FLACCID HEMIPLEGIA AFFECTING RIGHT DOMINANT SIDE, UNSPECIFIED ETIOLOGY (H): ICD-10-CM

## 2023-12-17 DIAGNOSIS — R09.02 HYPOXIA: ICD-10-CM

## 2023-12-17 DIAGNOSIS — A41.9 SEVERE SEPSIS (H): ICD-10-CM

## 2023-12-17 DIAGNOSIS — R65.20 SEVERE SEPSIS (H): ICD-10-CM

## 2023-12-17 DIAGNOSIS — E03.9 HYPOTHYROIDISM, UNSPECIFIED TYPE: Primary | ICD-10-CM

## 2023-12-17 LAB
ALBUMIN SERPL BCG-MCNC: 3.6 G/DL (ref 3.5–5.2)
ALBUMIN UR-MCNC: 20 MG/DL
ALP SERPL-CCNC: 101 U/L (ref 40–150)
ALT SERPL W P-5'-P-CCNC: 24 U/L (ref 0–70)
AMORPH CRY #/AREA URNS HPF: ABNORMAL /HPF
ANION GAP SERPL CALCULATED.3IONS-SCNC: 9 MMOL/L (ref 7–15)
APPEARANCE UR: CLEAR
AST SERPL W P-5'-P-CCNC: 68 U/L (ref 0–45)
BASOPHILS # BLD AUTO: 0.1 10E3/UL (ref 0–0.2)
BASOPHILS NFR BLD AUTO: 1 %
BILIRUB SERPL-MCNC: 0.7 MG/DL
BILIRUB UR QL STRIP: NEGATIVE
BUN SERPL-MCNC: 24.9 MG/DL (ref 8–23)
CALCIUM SERPL-MCNC: 8.7 MG/DL (ref 8.8–10.2)
CHLORIDE SERPL-SCNC: 85 MMOL/L (ref 98–107)
COLOR UR AUTO: YELLOW
CREAT SERPL-MCNC: 1.2 MG/DL (ref 0.67–1.17)
DEPRECATED HCO3 PLAS-SCNC: 37 MMOL/L (ref 22–29)
EGFRCR SERPLBLD CKD-EPI 2021: 69 ML/MIN/1.73M2
EOSINOPHIL # BLD AUTO: 0.5 10E3/UL (ref 0–0.7)
EOSINOPHIL NFR BLD AUTO: 4 %
ERYTHROCYTE [DISTWIDTH] IN BLOOD BY AUTOMATED COUNT: 13.7 % (ref 10–15)
GLUCOSE SERPL-MCNC: 144 MG/DL (ref 70–99)
GLUCOSE UR STRIP-MCNC: NEGATIVE MG/DL
HCO3 BLDV-SCNC: 40 MMOL/L (ref 21–28)
HCT VFR BLD AUTO: 42.1 % (ref 40–53)
HGB BLD-MCNC: 13.7 G/DL (ref 13.3–17.7)
HGB UR QL STRIP: ABNORMAL
HYALINE CASTS: 8 /LPF
IMM GRANULOCYTES # BLD: 0.1 10E3/UL
IMM GRANULOCYTES NFR BLD: 0 %
KETONES UR STRIP-MCNC: NEGATIVE MG/DL
LACTATE BLD-SCNC: 3.7 MMOL/L
LACTATE SERPL-SCNC: 3.2 MMOL/L (ref 0.7–2)
LEUKOCYTE ESTERASE UR QL STRIP: NEGATIVE
LYMPHOCYTES # BLD AUTO: 2.5 10E3/UL (ref 0.8–5.3)
LYMPHOCYTES NFR BLD AUTO: 19 %
MCH RBC QN AUTO: 29.4 PG (ref 26.5–33)
MCHC RBC AUTO-ENTMCNC: 32.5 G/DL (ref 31.5–36.5)
MCV RBC AUTO: 90 FL (ref 78–100)
MONOCYTES # BLD AUTO: 1.5 10E3/UL (ref 0–1.3)
MONOCYTES NFR BLD AUTO: 11 %
MUCOUS THREADS #/AREA URNS LPF: PRESENT /LPF
NEUTROPHILS # BLD AUTO: 8.9 10E3/UL (ref 1.6–8.3)
NEUTROPHILS NFR BLD AUTO: 65 %
NITRATE UR QL: NEGATIVE
NRBC # BLD AUTO: 0 10E3/UL
NRBC BLD AUTO-RTO: 0 /100
PCO2 BLDV: 67 MM HG (ref 40–50)
PH BLDV: 7.38 [PH] (ref 7.32–7.43)
PH UR STRIP: 8 [PH] (ref 5–7)
PLATELET # BLD AUTO: 223 10E3/UL (ref 150–450)
PO2 BLDV: 17 MM HG (ref 25–47)
POTASSIUM SERPL-SCNC: 2.8 MMOL/L (ref 3.4–5.3)
PROCALCITONIN SERPL IA-MCNC: 0.13 NG/ML
PROT SERPL-MCNC: 7.3 G/DL (ref 6.4–8.3)
RBC # BLD AUTO: 4.66 10E6/UL (ref 4.4–5.9)
RBC URINE: 2 /HPF
SAO2 % BLDV: 21 % (ref 94–100)
SARS-COV-2 RNA RESP QL NAA+PROBE: NEGATIVE
SODIUM SERPL-SCNC: 131 MMOL/L (ref 135–145)
SP GR UR STRIP: 1.02 (ref 1–1.03)
UROBILINOGEN UR STRIP-MCNC: NORMAL MG/DL
WBC # BLD AUTO: 13.5 10E3/UL (ref 4–11)
WBC URINE: 1 /HPF

## 2023-12-17 PROCEDURE — 87086 URINE CULTURE/COLONY COUNT: CPT | Performed by: EMERGENCY MEDICINE

## 2023-12-17 PROCEDURE — 99223 1ST HOSP IP/OBS HIGH 75: CPT | Mod: AI | Performed by: INTERNAL MEDICINE

## 2023-12-17 PROCEDURE — 250N000013 HC RX MED GY IP 250 OP 250 PS 637: Performed by: INTERNAL MEDICINE

## 2023-12-17 PROCEDURE — 87635 SARS-COV-2 COVID-19 AMP PRB: CPT | Performed by: INTERNAL MEDICINE

## 2023-12-17 PROCEDURE — 87040 BLOOD CULTURE FOR BACTERIA: CPT | Performed by: EMERGENCY MEDICINE

## 2023-12-17 PROCEDURE — 81001 URINALYSIS AUTO W/SCOPE: CPT | Performed by: EMERGENCY MEDICINE

## 2023-12-17 PROCEDURE — 250N000011 HC RX IP 250 OP 636: Mod: JZ | Performed by: INTERNAL MEDICINE

## 2023-12-17 PROCEDURE — 84145 PROCALCITONIN (PCT): CPT | Performed by: INTERNAL MEDICINE

## 2023-12-17 PROCEDURE — 71045 X-RAY EXAM CHEST 1 VIEW: CPT

## 2023-12-17 PROCEDURE — 85025 COMPLETE CBC W/AUTO DIFF WBC: CPT | Performed by: EMERGENCY MEDICINE

## 2023-12-17 PROCEDURE — 96360 HYDRATION IV INFUSION INIT: CPT

## 2023-12-17 PROCEDURE — 258N000001 HC RX 258: Performed by: EMERGENCY MEDICINE

## 2023-12-17 PROCEDURE — 80053 COMPREHEN METABOLIC PANEL: CPT | Performed by: EMERGENCY MEDICINE

## 2023-12-17 PROCEDURE — 83605 ASSAY OF LACTIC ACID: CPT

## 2023-12-17 PROCEDURE — 99291 CRITICAL CARE FIRST HOUR: CPT | Mod: 25

## 2023-12-17 PROCEDURE — 36415 COLL VENOUS BLD VENIPUNCTURE: CPT | Performed by: EMERGENCY MEDICINE

## 2023-12-17 PROCEDURE — 120N000001 HC R&B MED SURG/OB

## 2023-12-17 PROCEDURE — 258N000003 HC RX IP 258 OP 636: Performed by: EMERGENCY MEDICINE

## 2023-12-17 PROCEDURE — 82803 BLOOD GASES ANY COMBINATION: CPT

## 2023-12-17 PROCEDURE — 250N000011 HC RX IP 250 OP 636: Mod: JZ | Performed by: EMERGENCY MEDICINE

## 2023-12-17 PROCEDURE — 99292 CRITICAL CARE ADDL 30 MIN: CPT

## 2023-12-17 PROCEDURE — 36415 COLL VENOUS BLD VENIPUNCTURE: CPT

## 2023-12-17 PROCEDURE — 258N000003 HC RX IP 258 OP 636: Performed by: INTERNAL MEDICINE

## 2023-12-17 RX ORDER — PIPERACILLIN SODIUM, TAZOBACTAM SODIUM 4; .5 G/20ML; G/20ML
4.5 INJECTION, POWDER, LYOPHILIZED, FOR SOLUTION INTRAVENOUS ONCE
Status: DISCONTINUED | OUTPATIENT
Start: 2023-12-17 | End: 2023-12-17

## 2023-12-17 RX ORDER — LEVOFLOXACIN 5 MG/ML
750 INJECTION, SOLUTION INTRAVENOUS ONCE
Status: COMPLETED | OUTPATIENT
Start: 2023-12-17 | End: 2023-12-17

## 2023-12-17 RX ORDER — LEVOFLOXACIN 5 MG/ML
500 INJECTION, SOLUTION INTRAVENOUS EVERY 24 HOURS
Status: DISCONTINUED | OUTPATIENT
Start: 2023-12-18 | End: 2023-12-18

## 2023-12-17 RX ORDER — CARBAMAZEPINE 100 MG/5ML
100 SUSPENSION ORAL DAILY
Status: DISCONTINUED | OUTPATIENT
Start: 2023-12-17 | End: 2023-12-21 | Stop reason: HOSPADM

## 2023-12-17 RX ORDER — ASCORBIC ACID 500 MG
1000 TABLET ORAL DAILY
Status: DISCONTINUED | OUTPATIENT
Start: 2023-12-18 | End: 2023-12-21 | Stop reason: HOSPADM

## 2023-12-17 RX ORDER — METRONIDAZOLE 500 MG/100ML
500 INJECTION, SOLUTION INTRAVENOUS EVERY 12 HOURS
Status: DISCONTINUED | OUTPATIENT
Start: 2023-12-17 | End: 2023-12-18

## 2023-12-17 RX ORDER — ALBUTEROL SULFATE 5 MG/ML
2.5 SOLUTION RESPIRATORY (INHALATION) EVERY 6 HOURS PRN
Status: DISCONTINUED | OUTPATIENT
Start: 2023-12-17 | End: 2023-12-21 | Stop reason: HOSPADM

## 2023-12-17 RX ORDER — ONDANSETRON 4 MG/1
4 TABLET, ORALLY DISINTEGRATING ORAL EVERY 6 HOURS PRN
Status: DISCONTINUED | OUTPATIENT
Start: 2023-12-17 | End: 2023-12-21 | Stop reason: HOSPADM

## 2023-12-17 RX ORDER — CHOLECALCIFEROL (VITAMIN D3) 50 MCG
50 TABLET ORAL DAILY
Status: DISCONTINUED | OUTPATIENT
Start: 2023-12-18 | End: 2023-12-21 | Stop reason: HOSPADM

## 2023-12-17 RX ORDER — MULTIVITAMIN,THERAPEUTIC
1 TABLET ORAL DAILY
Status: DISCONTINUED | OUTPATIENT
Start: 2023-12-18 | End: 2023-12-21 | Stop reason: HOSPADM

## 2023-12-17 RX ORDER — CARBAMAZEPINE 100 MG/5ML
150 SUSPENSION ORAL 3 TIMES DAILY
Status: DISCONTINUED | OUTPATIENT
Start: 2023-12-18 | End: 2023-12-21 | Stop reason: HOSPADM

## 2023-12-17 RX ORDER — ACETAMINOPHEN 325 MG/1
650 TABLET ORAL EVERY 6 HOURS PRN
Status: DISCONTINUED | OUTPATIENT
Start: 2023-12-17 | End: 2023-12-21 | Stop reason: HOSPADM

## 2023-12-17 RX ORDER — BACITRACIN ZINC 500 [USP'U]/G
OINTMENT TOPICAL DAILY PRN
Status: DISCONTINUED | OUTPATIENT
Start: 2023-12-17 | End: 2023-12-21 | Stop reason: HOSPADM

## 2023-12-17 RX ORDER — ENOXAPARIN SODIUM 100 MG/ML
40 INJECTION SUBCUTANEOUS EVERY 24 HOURS
Status: DISCONTINUED | OUTPATIENT
Start: 2023-12-17 | End: 2023-12-21 | Stop reason: HOSPADM

## 2023-12-17 RX ORDER — ONDANSETRON 2 MG/ML
4 INJECTION INTRAMUSCULAR; INTRAVENOUS EVERY 6 HOURS PRN
Status: DISCONTINUED | OUTPATIENT
Start: 2023-12-17 | End: 2023-12-21 | Stop reason: HOSPADM

## 2023-12-17 RX ORDER — PROCHLORPERAZINE MALEATE 5 MG
10 TABLET ORAL EVERY 6 HOURS PRN
Status: DISCONTINUED | OUTPATIENT
Start: 2023-12-17 | End: 2023-12-21 | Stop reason: HOSPADM

## 2023-12-17 RX ORDER — DEXTROSE MONOHYDRATE, SODIUM CHLORIDE, AND POTASSIUM CHLORIDE 50; 1.49; 9 G/1000ML; G/1000ML; G/1000ML
INJECTION, SOLUTION INTRAVENOUS CONTINUOUS
Status: DISCONTINUED | OUTPATIENT
Start: 2023-12-17 | End: 2023-12-19

## 2023-12-17 RX ORDER — AMOXICILLIN 250 MG
1 CAPSULE ORAL 2 TIMES DAILY PRN
Status: DISCONTINUED | OUTPATIENT
Start: 2023-12-17 | End: 2023-12-21 | Stop reason: HOSPADM

## 2023-12-17 RX ORDER — CEFAZOLIN SODIUM 1 G/50ML
750 SOLUTION INTRAVENOUS EVERY 12 HOURS
Status: DISCONTINUED | OUTPATIENT
Start: 2023-12-18 | End: 2023-12-18

## 2023-12-17 RX ORDER — SODIUM BICARBONATE 650 MG/1
650 TABLET ORAL 2 TIMES DAILY
Status: CANCELLED | OUTPATIENT
Start: 2023-12-17

## 2023-12-17 RX ORDER — MUPIROCIN 20 MG/G
OINTMENT TOPICAL 2 TIMES DAILY PRN
Status: DISCONTINUED | OUTPATIENT
Start: 2023-12-17 | End: 2023-12-21 | Stop reason: HOSPADM

## 2023-12-17 RX ORDER — GLYCOPYRROLATE 1 MG/1
1 TABLET ORAL 2 TIMES DAILY PRN
Status: DISCONTINUED | OUTPATIENT
Start: 2023-12-17 | End: 2023-12-21 | Stop reason: HOSPADM

## 2023-12-17 RX ORDER — PROCHLORPERAZINE 25 MG
25 SUPPOSITORY, RECTAL RECTAL EVERY 12 HOURS PRN
Status: DISCONTINUED | OUTPATIENT
Start: 2023-12-17 | End: 2023-12-21 | Stop reason: HOSPADM

## 2023-12-17 RX ORDER — LIDOCAINE 40 MG/G
CREAM TOPICAL
Status: DISCONTINUED | OUTPATIENT
Start: 2023-12-17 | End: 2023-12-21 | Stop reason: HOSPADM

## 2023-12-17 RX ORDER — METRONIDAZOLE 500 MG/100ML
500 INJECTION, SOLUTION INTRAVENOUS EVERY 12 HOURS
Status: DISCONTINUED | OUTPATIENT
Start: 2023-12-18 | End: 2023-12-17

## 2023-12-17 RX ORDER — POLYETHYLENE GLYCOL 3350 17 G/17G
17 POWDER, FOR SOLUTION ORAL 2 TIMES DAILY PRN
Status: DISCONTINUED | OUTPATIENT
Start: 2023-12-17 | End: 2023-12-21 | Stop reason: HOSPADM

## 2023-12-17 RX ORDER — CALCIUM CARBONATE 500 MG/1
1000 TABLET, CHEWABLE ORAL 4 TIMES DAILY PRN
Status: DISCONTINUED | OUTPATIENT
Start: 2023-12-17 | End: 2023-12-21 | Stop reason: HOSPADM

## 2023-12-17 RX ORDER — METOCLOPRAMIDE HYDROCHLORIDE 5 MG/5ML
10 SOLUTION ORAL
Status: DISCONTINUED | OUTPATIENT
Start: 2023-12-17 | End: 2023-12-18

## 2023-12-17 RX ORDER — AMOXICILLIN 250 MG
2 CAPSULE ORAL 2 TIMES DAILY PRN
Status: DISCONTINUED | OUTPATIENT
Start: 2023-12-17 | End: 2023-12-21 | Stop reason: HOSPADM

## 2023-12-17 RX ADMIN — CARBAMAZEPINE 100 MG: 100 SUSPENSION ORAL at 21:15

## 2023-12-17 RX ADMIN — SODIUM CHLORIDE 2094 ML: 9 INJECTION, SOLUTION INTRAVENOUS at 13:03

## 2023-12-17 RX ADMIN — HYDROCORTISONE 15 MG: 10 TABLET ORAL at 21:18

## 2023-12-17 RX ADMIN — LEVOFLOXACIN 750 MG: 5 INJECTION, SOLUTION INTRAVENOUS at 14:02

## 2023-12-17 RX ADMIN — METOCLOPRAMIDE HYDROCHLORIDE 10 MG: 5 SOLUTION ORAL at 21:16

## 2023-12-17 RX ADMIN — HYDROCORTISONE SODIUM SUCCINATE 100 MG: 100 INJECTION, POWDER, FOR SOLUTION INTRAMUSCULAR; INTRAVENOUS at 13:49

## 2023-12-17 RX ADMIN — VANCOMYCIN HYDROCHLORIDE 1750 MG: 10 INJECTION, POWDER, LYOPHILIZED, FOR SOLUTION INTRAVENOUS at 15:37

## 2023-12-17 RX ADMIN — METRONIDAZOLE 500 MG: 500 INJECTION, SOLUTION INTRAVENOUS at 18:24

## 2023-12-17 RX ADMIN — HYDROCORTISONE SODIUM SUCCINATE 100 MG: 100 INJECTION, POWDER, FOR SOLUTION INTRAMUSCULAR; INTRAVENOUS at 21:32

## 2023-12-17 RX ADMIN — BRIVARACETAM 100 MG: 10 SOLUTION ORAL at 21:09

## 2023-12-17 RX ADMIN — ENOXAPARIN SODIUM 40 MG: 40 INJECTION SUBCUTANEOUS at 21:35

## 2023-12-17 RX ADMIN — POTASSIUM CHLORIDE, DEXTROSE MONOHYDRATE AND SODIUM CHLORIDE: 150; 5; 900 INJECTION, SOLUTION INTRAVENOUS at 21:08

## 2023-12-17 ASSESSMENT — ACTIVITIES OF DAILY LIVING (ADL)
ADLS_ACUITY_SCORE: 37

## 2023-12-17 NOTE — TELEPHONE ENCOUNTER
"Nurse Triage SBAR    Is this a 2nd Level Triage? NO    Situation: Low blood pressure    Background: Patient's mother calling, states that Keyon had low blood pressure this morning, 88/50's. She states she gave him fluids and salt through his feeding tube and his BP recovered to 98/64.  She denies fever.  She stated his breathing was funny this morning but states how he has calmed down.  When she asks him if he has pain he shakes his head no.  He does have a history of sepsis but his mother states he is not showing those symptoms.     Assessment: Low BP    Protocol Recommended Disposition: See PCP within 3 days      Recommendation: Patient's mother will continue to monitor and call back if his symptoms get worse.      N/A    BRANDI ARVIZU RN    Does the patient meet one of the following criteria for ADS visit consideration? 16+ years old, with an [facility] PCP     TIP  Providers, please consider if this condition is appropriate for management at one of our Acute and Diagnostic Services sites.     If patient is a good candidate, please use dotphrase <dot>triageresponse and select Refer to ADS to document.    Reason for Disposition   [1] Fall in systolic BP > 20 mm Hg from normal AND [2] NOT dizzy, lightheaded, or weak    Additional Information   Negative: Started suddenly after an allergic medicine, an allergic food, or bee sting   Negative: Shock suspected (e.g., cold/pale/clammy skin, too weak to stand, low BP, rapid pulse)   Negative: Difficult to awaken or acting confused (e.g., disoriented, slurred speech)   Negative: Fainted   Negative: [1] Systolic BP < 90 AND [2] dizzy, lightheaded, or weak   Negative: Chest pain   Negative: Bleeding (e.g., vomiting blood, rectal bleeding or tarry stools, severe vaginal bleeding)(Exception: Fainted from sight of small amount of blood; small cut or abrasion.)   Negative: Extra heartbeats, irregular heart beating, or heart is beating very fast  (i.e., \"palpitations\")   " Negative: Sounds like a life-threatening emergency to the triager   Negative: [1] Systolic BP < 80 AND [2] NOT dizzy, lightheaded or weak   Negative: Abdominal pain   Negative: Fever > 100.4 F (38.0 C)   Negative: Major surgery in the past month   Negative: [1] Drinking very little AND [2] dehydration suspected (e.g., no urine > 12 hours, very dry mouth, very lightheaded)   Negative: [1] Fall in systolic BP > 20 mm Hg from normal AND [2] dizzy, lightheaded, or weak   Negative: Patient sounds very sick or weak to the triager   Negative: [1] Systolic BP < 90 AND [2] NOT dizzy, lightheaded or weak   Negative: [1] Systolic BP  AND [2] taking blood pressure medications AND [3] dizzy, lightheaded or weak   Negative: [1] Systolic BP  AND [2] taking blood pressure medications AND [3] NOT dizzy, lightheaded or weak    Protocols used: Blood Pressure - Low-A-

## 2023-12-17 NOTE — ED NOTES
Bed: ST03  Expected date:   Expected time:   Means of arrival:   Comments:  Kerri 3 63 M hx TBI change in mental status eta 1215

## 2023-12-17 NOTE — PHARMACY-VANCOMYCIN DOSING SERVICE
Pharmacy Vancomycin Initial Note  Date of Service 2023  Patient's  1960  63 year old, male    Indication: Aspiration Pneumonia and Healthcare-Associated Pneumonia    Current estimated CrCl = Estimated Creatinine Clearance: 64.8 mL/min (A) (based on SCr of 1.2 mg/dL (H)).    Creatinine for last 3 days  2023: 12:42 PM Creatinine 1.20 mg/dL    Recent Vancomycin Level(s) for last 3 days  No results found for requested labs within last 3 days.      Vancomycin IV Administrations (past 72 hours)                     vancomycin (VANCOCIN) 1,750 mg in 0.9% NaCl 500 mL intermittent infusion (mg) 1,750 mg Given 23 1537                    Nephrotoxins and other renal medications (From now, onward)      Start     Dose/Rate Route Frequency Ordered Stop    23 1415  vancomycin (VANCOCIN) 1,750 mg in 0.9% NaCl 500 mL intermittent infusion         1,750 mg  over 2 Hours Intravenous ONCE 23 1417              Contrast Orders - past 72 hours (72h ago, onward)      None            Insight[employer] Prediction of Planned Initial Vancomycin Regimen  Loading dose: 1750 mg IV once  Regimen: 750 mg IV every 12 hours.  Start time: 03:37 on 2023  Exposure target: AUC24 (range)400-600 mg/L.hr   AUC24,ss: 533 mg/L.hr  Probability of AUC24 > 400: 79 %  Ctrough,ss: 17.9 mg/L  Probability of Ctrough,ss > 20: 40 %  Probability of nephrotoxicity (Lodise ERNESTO ): 14 %          Plan:  Start vancomycin 1750 IV x 1, then 750 mg IV q12h.   Vancomycin monitoring method: AUC  Vancomycin therapeutic monitoring goal: 400-600 mg*h/L  Pharmacy will check vancomycin levels as appropriate in 1-3 Days.    Serum creatinine levels will be ordered daily for the first week of therapy and at least twice weekly for subsequent weeks.      Wilson Pickens [facility]

## 2023-12-17 NOTE — PHARMACY-ADMISSION MEDICATION HISTORY
Pharmacist Admission Medication History    Admission medication history is complete. The information provided in this note is only as accurate as the sources available at the time of the update.    Information Source(s): Mother, Family member and CareEverywhere/SureScripts via in-person    Pertinent Information: Patient was discharged from the hospital on 12/8/23.  Mother ran out of Sodium Bicarbonate at home, and has been giving him table salt instead    Changes made to PTA medication list:  Added: None  Deleted: None  Changed: None    Allergies reviewed with patient and updates made in EHR: no    Medication History Completed By: Jean Paul Sarabia RPH 12/17/2023 2:46 PM    PTA Med List   Medication Sig Note Last Dose    acetaminophen (TYLENOL) 325 MG tablet Take 650 mg by mouth every 6 hours as needed for mild pain  Unknown    albuterol (PROVENTIL) (5 MG/ML) 0.5% neb solution Take 0.5 mLs (2.5 mg) by nebulization every 6 hours as needed for shortness of breath, wheezing or cough 0700 1100 1500 1900 with mucomyst  Unknown    bacitracin 500 UNIT/GM external ointment Apply topically daily as needed for wound care To PEG site.  Unknown    Brivaracetam (BRIVIACT) 10 MG/ML solution 100 mg by Oral or Feeding Tube route 2 times daily 0900, 2100  12/17/2023 at am    carBAMazepine (TEGRETOL) 100 MG/5ML suspension 7.5 mLs (150 mg) by Oral or Feeding Tube route 3 times daily At 06:00, 12:00, and 24:00 for seizures  12/17/2023 at 1200    carBAMazepine (TEGRETOL) 100 MG/5ML suspension 100 mg by Per Feeding Tube route daily Take at 1800  12/16/2023    COMPOUNDED NON-CONTROLLED SUBSTANCE (CMPD RX) - PHARMACY TO MIX COMPOUNDED MEDICATION Scopolamine 0.4mg capsules - take  2 capsule by feeding tube three times daily as needed  Unknown    Cyanocobalamin (VITAMIN B-12 PO) 2,500 mcg by Per Feeding Tube route daily  12/17/2023 at am    glycopyrrolate (ROBINUL) 1 MG tablet Take 1 tablet (1 mg) by mouth 2 times daily as needed (secretions)   Unknown    guaiFENesin (MUCINEX) 600 MG 12 hr tablet Take 1 tablet (600 mg) by mouth 2 times daily as needed for congestion  12/17/2023 at 0900    hydrocortisone (CORTAID) 1 % external cream Apply topically 2 times daily as needed Apply to reddened memo areas as needed  Unknown    hydrocortisone (CORTEF) 5 MG tablet Take 15 mg (3 tablets) in the morning and 7.5 mg (1.5 tablet)  at 2:00 PM. During illness patient takes more as a stress dose. Please increase the dose as directed. (Patient taking differently: Take 15 mg (3 tablets) in the morning and 7.5 mg (1 tablet)  at 6:00 PM. Per feeding tube. During illness patient takes more as a stress dose. Mother reports doubling each dose for fever)  12/17/2023 at am    levothyroxine (SYNTHROID/LEVOTHROID) 125 MCG tablet Take 1 tablet (125 mcg) by mouth daily  12/17/2023 at 0500    metoclopramide (REGLAN) 10 MG/10ML SOLN solution Take 10 mLs (10 mg) by mouth 4 times daily (before meals and nightly) 0800, 1200, 1600, 2000 Disconnects bag before administration, then waits 45 mins before reconnecting after giving the medication  12/17/2023 at 0800    miconazole (MICATIN) 2 % external powder Apply topically 2 times daily as needed  Unknown    multivitamin, therapeutic (THERA-VIT) TABS tablet 1 tablet by Per Feeding Tube route daily  12/17/2023 at am    mupirocin (BACTROBAN) 2 % external ointment APPLY TOPICALLY TO THE AFFECTED AREA TWICE DAILY AS NEEDED  Unknown    pantoprazole (PROTONIX) 2 mg/mL SUSP suspension TAKE 20ML PER FEEDING TUBE DAILY  12/16/2023 at AM    sodium bicarbonate 650 MG tablet TAKE 1 TABLET(650 MG) BY MOUTH TWICE DAILY (Patient taking differently: 650 mg by Per Feeding Tube route 2 times daily)  12/8/2023 at ran out    testosterone cypionate (DEPOTESTOSTERONE) 200 MG/ML injection Inject 0.25 mLs (50 mg) into the muscle once a week 12/17/2023: Given on tuesdays 12/12/2023    vitamin C (ASCORBIC ACID) 1000 MG TABS 1,000 mg by Oral or Feeding Tube route daily    12/17/2023 at am    vitamin D3 (CHOLECALCIFEROL) 2000 units (50 mcg) tablet 2,000 Units by Per Feeding Tube route daily  12/17/2023 at am

## 2023-12-17 NOTE — ED NOTES
[facility]  ED Nurse Handoff Report    ED Chief complaint: Altered Mental Status      ED Diagnosis:   Final diagnoses:   Severe sepsis (H)   Hypoxia       Code Status: Full Code    Allergies:   Allergies   Allergen Reactions    Valproic Acid Other (See Comments)     Toxicity w/ bone marrow suspension, elevated ammonia levels     Dilantin [Phenytoin Sodium] Other (See Comments)     Severe Trembling    Scopolamine Hives     Hives with the patch - oral no problem       Patient Story: Pt presents with SOB and Cough. Elevated lactate. Sepsis.   Focused Assessment:  SOB, improved in visit.     Treatments and/or interventions provided: See MAR, IV fluids abx.   Patient's response to treatments and/or interventions: Lactate improved    To be done/followed up on inpatient unit:  Cont abx.     Does this patient have any cognitive concerns?: Disoriented to time, Disoriented to place, Disoriented to situation, and Disorientation to person    Activity level - Baseline/Home:  Total Care  Activity Level - Current:   Total Care    Patient's Preferred language: English   Needed?: No    Isolation: None  Infection: Not Applicable  Patient tested for COVID 19 prior to admission: YES  Bariatric?: No    Vital Signs:   Vitals:    12/17/23 1315 12/17/23 1345 12/17/23 1400 12/17/23 1500   BP: 103/76 133/58  (!) 119/96   Pulse: 113 112 113 118   Resp: 27 21  12   Temp:       TempSrc:       SpO2: 96% 100%     Weight:       Height:           Cardiac Rhythm:Cardiac Rhythm: Sinus tachycardia    Was the PSS-3 completed:   No -Cognitive barrier  What interventions are required if any?               Family Comments: Mother at home, available via phone  OBS brochure/video discussed/provided to patient/family: N/A              Name of person given brochure if not patient: NA              Relationship to patient: NA    For the majority of the shift this patient's behavior was Green.   Behavioral interventions performed were  NA.    ED NURSE PHONE NUMBER: [id number]

## 2023-12-17 NOTE — ED PROVIDER NOTES
"  History     Chief Complaint:  Altered Mental Status       The history is provided by the EMS personnel.      Keyon Farias is a 63 year old male with a significant past medical history who presents with an altered mental status. EMS report that he had systolic's in the upper 80's and BS of 198. He was also 70% on room air. When they arrived his mom was suctioning him. She denied any fevers.     Independent Historian:   EMS - They report per HPI    Review of External Notes:   I reviewed nurse triage note from 12/17/23     Medications:    Albuterol  Robinul   Cortef   Synthroid   Cholecalciferol   Reglan      Past Medical History:    DVT   Asthma   Hyperlipidemia   Pneumonia   Seizures   GERD  Thyroid disease  Panhypopituitarism  Ventricular tachyarrhythmia  Leukocytosis   Cardiac arrest   TBI   Pancreatitis  Vitamin D deficiency     Past Surgical History:    EGD x 4  Vascular surgery   Tracheostomy x2   Laparoscopic appendectomy   IR gastro jejunostomy tube change x21    Physical Exam   Patient Vitals for the past 24 hrs:   BP Temp Temp src Pulse Resp SpO2 Height Weight   12/17/23 1500 (!) 119/96 -- -- 118 12 -- -- --   12/17/23 1400 -- -- -- 113 -- -- -- --   12/17/23 1345 133/58 -- -- 112 21 100 % -- --   12/17/23 1315 103/76 -- -- 113 27 96 % -- --   12/17/23 1228 101/57 98.4  F (36.9  C) Temporal 113 (!) 42 (!) 85 % 1.778 m (5' 10\") 70.9 kg (156 lb 4.9 oz)        Physical Exam  General: Responsive to voice but not speaking.  Head: No signs of trauma.   Mouth/Throat: Extremely dry mucus membranes  Eyes: Conjunctivae are normal. Pupils are equal..   Neck: Normal range of motion.    CV: Tachypnea, tachycardic  Abdomen: Feeding tube left upper abdomen.  Resp:No respiratory distress. Coarse breath sounds   MSK: Normal range of motion. No obvious deformity.   Neuro: The patient is alert and interactive. KARIMI.  Skin: No lesion or sign of trauma noted.   Psych: normal mood and affect. behavior is normal.     Emergency " Department Course   Imaging:  XR Chest Port 1 View   Final Result   IMPRESSION: Heart is normal in size. Lungs are clear.             Laboratory:  Labs Ordered and Resulted from Time of ED Arrival to Time of ED Departure   CBC WITH PLATELETS AND DIFFERENTIAL - Abnormal       Result Value    WBC Count 13.5 (*)     RBC Count 4.66      Hemoglobin 13.7      Hematocrit 42.1      MCV 90      MCH 29.4      MCHC 32.5      RDW 13.7      Platelet Count 223      % Neutrophils 65      % Lymphocytes 19      % Monocytes 11      % Eosinophils 4      % Basophils 1      % Immature Granulocytes 0      NRBCs per 100 WBC 0      Absolute Neutrophils 8.9 (*)     Absolute Lymphocytes 2.5      Absolute Monocytes 1.5 (*)     Absolute Eosinophils 0.5      Absolute Basophils 0.1      Absolute Immature Granulocytes 0.1      Absolute NRBCs 0.0     ISTAT GASES LACTATE VENOUS POCT - Abnormal    Lactic Acid POCT 3.7 (*)     Bicarbonate Venous POCT 40 (*)     O2 Sat, Venous POCT 21 (*)     pCO2 Venous POCT 67 (*)     pH Venous POCT 7.38      pO2 Venous POCT 17 (*)    COMPREHENSIVE METABOLIC PANEL   ROUTINE UA WITH MICROSCOPIC   URINE CULTURE   BLOOD CULTURE   BLOOD CULTURE      Emergency Department Course & Assessments:       Interventions:  Medications   sodium chloride (PF) 0.9% PF flush 3 mL (has no administration in time range)   sodium chloride (PF) 0.9% PF flush 3 mL (3 mLs Intracatheter $Given 12/17/23 1304)   sodium chloride 0.9% BOLUS 2,094 mL (2,094 mLs Intravenous $New Bag 12/17/23 1303)   levofloxacin (LEVAQUIN) infusion 750 mg (has no administration in time range)        Assessments:  1215 I obtained history and examined the patient as noted above.    Independent Interpretation (X-rays, CTs, rhythm strip):  I reviewed chest X-ray and no pneumothorax.    Consultations/Discussion of Management or Tests:  1335 I spoke with Dr. Ramirez of the hospitalist team regarding the patient, who accepted the patient for admission.        Social  Determinants of Health affecting care:   Stress/Adjustment Disorders    Disposition:  The patient was admitted to the hospital under the care of Dr. Ramirez.     Impression & Plan    CMS Diagnoses: The patient has signs of Severe Sepsis        If one the following conditions is present, a 30 mL/kg bolus is recommended as part of the 6 hour bundle (IBW can be used for BMI >30, or document refusal/contraindication):      1.   Initial hypotension  defined as 2 bps < 90 or map < 65 in the 6hrs before or 3hrs after time zero.     2.  Lactate >4.      The patient has signs of Severe Sepsis as evidenced by:    1. 2 SIRS criteria, AND  2. Suspected infection, AND   3. Organ dysfunction: Lactic Acidosis with value >2.0 and Acute encephalopathy due to sepsis    Time severe sepsis diagnosis confirmed: 1300  12/17/23 as this was the time when Lactate resulted, and the level was > 2.0    3 Hour Severe Sepsis Bundle Completion:    1. Initial Lactic Acid Result:   Recent Labs   Lab Test 12/17/23  1608 12/17/23  1300 11/29/23  1513   LACT 3.2* 3.7* 1.8     2. Blood Cultures before Antibiotics: Yes  3. Broad Spectrum Antibiotics Administered:  yes       Anti-infectives (From admission through now)      Start     Dose/Rate Route Frequency Ordered Stop    12/17/23 1430  metroNIDAZOLE (FLAGYL) infusion 500 mg        Note to Pharmacy: Metronidazole IV may be dosed more frequently than Q12h for bacteremia, CNS infection and Clostridium difficile.    500 mg  over 60 Minutes Intravenous EVERY 12 HOURS 12/17/23 1404      12/17/23 1415  vancomycin (VANCOCIN) 1,750 mg in 0.9% NaCl 500 mL intermittent infusion         1,750 mg  over 2 Hours Intravenous ONCE 12/17/23 1417      12/17/23 1335  levofloxacin (LEVAQUIN) infusion 750 mg         750 mg  100 mL/hr over 90 Minutes Intravenous ONCE 12/17/23 1333 12/17/23 1532                                  Medical Decision Making:  Keyon GRAHAM Syedrosa is a 63 year old male who presents for evaluation of AMS.  He  has evidence of sepsis without hypotension.  The etiology of the sepsis is most likely respiratory/aspiration.  The patient responded to fluid resuscitation.  Broad-spectrum antibiotics were administered.  Patient was stabilized with supplemental oxygen and fluids.  He will be admitted to the hospital for further evaluation and treatment.      Diagnosis:    ICD-10-CM    1. Severe sepsis (H)  A41.9     R65.20       2. Hypoxia  R09.02            Scribe Disclosure:  I, Castro Kaur, am serving as a scribe at 12:30 PM on 12/17/2023 to document services personally performed by Levi Castle MD based on my observations and the provider's statements to me.   12/17/2023   Levi Castle MD Joing, Todd Roger, MD  12/17/23 [id number]

## 2023-12-17 NOTE — ED TRIAGE NOTES
BIBA from home, decreased LOC and activity below baseline. Hypoxic for EMS in 70's on RA. Tachypnic and tachycardic, EMS presumed septic. IO=[id number]     Triage Assessment (Adult)       Row Name 12/17/23 1226          Triage Assessment    Airway WDL WDL        Respiratory WDL    Respiratory WDL X  low SpO2 requiring NRB mask        Skin Circulation/Temperature WDL    Skin Circulation/Temperature WDL X  dry and pale        Cardiac WDL    Cardiac WDL X  tachycardic     Cardiac Rhythm ST        Peripheral/Neurovascular WDL    Peripheral Neurovascular WDL X  cool extremities        Cognitive/Neuro/Behavioral WDL    Cognitive/Neuro/Behavioral WDL X
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
## 2023-12-18 ENCOUNTER — TELEPHONE (OUTPATIENT)
Dept: FAMILY MEDICINE | Facility: CLINIC | Age: 63
End: 2023-12-18
Payer: MEDICARE

## 2023-12-18 LAB
ALBUMIN SERPL BCG-MCNC: 3 G/DL (ref 3.5–5.2)
ALP SERPL-CCNC: 72 U/L (ref 40–150)
ALT SERPL W P-5'-P-CCNC: 22 U/L (ref 0–70)
ANION GAP SERPL CALCULATED.3IONS-SCNC: 8 MMOL/L (ref 7–15)
AST SERPL W P-5'-P-CCNC: 75 U/L (ref 0–45)
BASOPHILS # BLD AUTO: 0 10E3/UL (ref 0–0.2)
BASOPHILS NFR BLD AUTO: 0 %
BILIRUB SERPL-MCNC: 0.4 MG/DL
BUN SERPL-MCNC: 13.6 MG/DL (ref 8–23)
CALCIUM SERPL-MCNC: 7.6 MG/DL (ref 8.8–10.2)
CHLORIDE SERPL-SCNC: 98 MMOL/L (ref 98–107)
CREAT SERPL-MCNC: 0.74 MG/DL (ref 0.67–1.17)
DEPRECATED HCO3 PLAS-SCNC: 27 MMOL/L (ref 22–29)
EGFRCR SERPLBLD CKD-EPI 2021: >90 ML/MIN/1.73M2
EOSINOPHIL # BLD AUTO: 0.1 10E3/UL (ref 0–0.7)
EOSINOPHIL NFR BLD AUTO: 1 %
ERYTHROCYTE [DISTWIDTH] IN BLOOD BY AUTOMATED COUNT: 13.6 % (ref 10–15)
GLUCOSE SERPL-MCNC: 153 MG/DL (ref 70–99)
HCT VFR BLD AUTO: 34.6 % (ref 40–53)
HGB BLD-MCNC: 11.7 G/DL (ref 13.3–17.7)
IMM GRANULOCYTES # BLD: 0 10E3/UL
IMM GRANULOCYTES NFR BLD: 0 %
LACTATE SERPL-SCNC: 2.3 MMOL/L (ref 0.7–2)
LYMPHOCYTES # BLD AUTO: 1.3 10E3/UL (ref 0.8–5.3)
LYMPHOCYTES NFR BLD AUTO: 11 %
MCH RBC QN AUTO: 29.9 PG (ref 26.5–33)
MCHC RBC AUTO-ENTMCNC: 33.8 G/DL (ref 31.5–36.5)
MCV RBC AUTO: 89 FL (ref 78–100)
MONOCYTES # BLD AUTO: 0.6 10E3/UL (ref 0–1.3)
MONOCYTES NFR BLD AUTO: 5 %
NEUTROPHILS # BLD AUTO: 9.4 10E3/UL (ref 1.6–8.3)
NEUTROPHILS NFR BLD AUTO: 83 %
NRBC # BLD AUTO: 0 10E3/UL
NRBC BLD AUTO-RTO: 0 /100
PHOSPHATE SERPL-MCNC: 1.2 MG/DL (ref 2.5–4.5)
PHOSPHATE SERPL-MCNC: 1.8 MG/DL (ref 2.5–4.5)
PLATELET # BLD AUTO: 142 10E3/UL (ref 150–450)
POTASSIUM SERPL-SCNC: 3.6 MMOL/L (ref 3.4–5.3)
PROT SERPL-MCNC: 5.9 G/DL (ref 6.4–8.3)
RBC # BLD AUTO: 3.91 10E6/UL (ref 4.4–5.9)
SODIUM SERPL-SCNC: 133 MMOL/L (ref 135–145)
WBC # BLD AUTO: 11.4 10E3/UL (ref 4–11)

## 2023-12-18 PROCEDURE — 250N000011 HC RX IP 250 OP 636: Mod: JZ | Performed by: INTERNAL MEDICINE

## 2023-12-18 PROCEDURE — 250N000013 HC RX MED GY IP 250 OP 250 PS 637: Performed by: INTERNAL MEDICINE

## 2023-12-18 PROCEDURE — C9113 INJ PANTOPRAZOLE SODIUM, VIA: HCPCS | Performed by: INTERNAL MEDICINE

## 2023-12-18 PROCEDURE — 258N000003 HC RX IP 258 OP 636: Performed by: EMERGENCY MEDICINE

## 2023-12-18 PROCEDURE — 84100 ASSAY OF PHOSPHORUS: CPT | Performed by: INTERNAL MEDICINE

## 2023-12-18 PROCEDURE — 36415 COLL VENOUS BLD VENIPUNCTURE: CPT | Performed by: HOSPITALIST

## 2023-12-18 PROCEDURE — 99222 1ST HOSP IP/OBS MODERATE 55: CPT | Performed by: SPECIALIST

## 2023-12-18 PROCEDURE — G0463 HOSPITAL OUTPT CLINIC VISIT: HCPCS

## 2023-12-18 PROCEDURE — 36415 COLL VENOUS BLD VENIPUNCTURE: CPT | Performed by: INTERNAL MEDICINE

## 2023-12-18 PROCEDURE — 82040 ASSAY OF SERUM ALBUMIN: CPT | Performed by: INTERNAL MEDICINE

## 2023-12-18 PROCEDURE — 120N000001 HC R&B MED SURG/OB

## 2023-12-18 PROCEDURE — 250N000011 HC RX IP 250 OP 636: Mod: JZ | Performed by: EMERGENCY MEDICINE

## 2023-12-18 PROCEDURE — 85025 COMPLETE CBC W/AUTO DIFF WBC: CPT | Performed by: INTERNAL MEDICINE

## 2023-12-18 PROCEDURE — 83605 ASSAY OF LACTIC ACID: CPT | Performed by: HOSPITALIST

## 2023-12-18 PROCEDURE — 250N000013 HC RX MED GY IP 250 OP 250 PS 637: Performed by: HOSPITALIST

## 2023-12-18 PROCEDURE — 258N000001 HC RX 258: Performed by: INTERNAL MEDICINE

## 2023-12-18 PROCEDURE — 84100 ASSAY OF PHOSPHORUS: CPT | Performed by: HOSPITALIST

## 2023-12-18 PROCEDURE — 99233 SBSQ HOSP IP/OBS HIGH 50: CPT | Performed by: HOSPITALIST

## 2023-12-18 RX ORDER — METOCLOPRAMIDE HYDROCHLORIDE 5 MG/5ML
10 SOLUTION ORAL
Status: DISCONTINUED | OUTPATIENT
Start: 2023-12-19 | End: 2023-12-19

## 2023-12-18 RX ORDER — DEXTROSE MONOHYDRATE 100 MG/ML
INJECTION, SOLUTION INTRAVENOUS CONTINUOUS PRN
Status: DISCONTINUED | OUTPATIENT
Start: 2023-12-18 | End: 2023-12-21 | Stop reason: HOSPADM

## 2023-12-18 RX ORDER — METOCLOPRAMIDE HYDROCHLORIDE 5 MG/5ML
10 SOLUTION ORAL
Status: DISCONTINUED | OUTPATIENT
Start: 2023-12-18 | End: 2023-12-18

## 2023-12-18 RX ADMIN — OXYCODONE HYDROCHLORIDE AND ACETAMINOPHEN 1000 MG: 500 TABLET ORAL at 08:48

## 2023-12-18 RX ADMIN — CARBAMAZEPINE 150 MG: 100 SUSPENSION ORAL at 12:30

## 2023-12-18 RX ADMIN — LEVOTHYROXINE SODIUM 125 MCG: 75 TABLET ORAL at 06:26

## 2023-12-18 RX ADMIN — VANCOMYCIN HYDROCHLORIDE 750 MG: 500 INJECTION, POWDER, LYOPHILIZED, FOR SOLUTION INTRAVENOUS at 15:34

## 2023-12-18 RX ADMIN — CARBAMAZEPINE 150 MG: 100 SUSPENSION ORAL at 06:26

## 2023-12-18 RX ADMIN — BRIVARACETAM 100 MG: 10 SOLUTION ORAL at 22:13

## 2023-12-18 RX ADMIN — DEXTROSE MONOHYDRATE: 100 INJECTION, SOLUTION INTRAVENOUS at 21:38

## 2023-12-18 RX ADMIN — CARBAMAZEPINE 150 MG: 100 SUSPENSION ORAL at 00:34

## 2023-12-18 RX ADMIN — VANCOMYCIN HYDROCHLORIDE 750 MG: 500 INJECTION, POWDER, LYOPHILIZED, FOR SOLUTION INTRAVENOUS at 03:01

## 2023-12-18 RX ADMIN — HYDROCORTISONE 15 MG: 10 TABLET ORAL at 08:48

## 2023-12-18 RX ADMIN — ENOXAPARIN SODIUM 40 MG: 40 INJECTION SUBCUTANEOUS at 22:13

## 2023-12-18 RX ADMIN — POTASSIUM & SODIUM PHOSPHATES POWDER PACK 280-160-250 MG 2 PACKET: 280-160-250 PACK at 13:58

## 2023-12-18 RX ADMIN — HYDROCORTISONE SODIUM SUCCINATE 100 MG: 100 INJECTION, POWDER, FOR SOLUTION INTRAMUSCULAR; INTRAVENOUS at 06:22

## 2023-12-18 RX ADMIN — POTASSIUM & SODIUM PHOSPHATES POWDER PACK 280-160-250 MG 2 PACKET: 280-160-250 PACK at 18:02

## 2023-12-18 RX ADMIN — CARBAMAZEPINE 100 MG: 100 SUSPENSION ORAL at 18:06

## 2023-12-18 RX ADMIN — METRONIDAZOLE 500 MG: 500 INJECTION, SOLUTION INTRAVENOUS at 06:20

## 2023-12-18 RX ADMIN — PANTOPRAZOLE SODIUM 40 MG: 40 INJECTION, POWDER, FOR SOLUTION INTRAVENOUS at 08:46

## 2023-12-18 RX ADMIN — POTASSIUM & SODIUM PHOSPHATES POWDER PACK 280-160-250 MG 2 PACKET: 280-160-250 PACK at 10:33

## 2023-12-18 RX ADMIN — METOCLOPRAMIDE HYDROCHLORIDE 10 MG: 5 SOLUTION ORAL at 15:39

## 2023-12-18 RX ADMIN — Medication 50 MCG: at 08:48

## 2023-12-18 RX ADMIN — MULTIVITAMIN TABLET 1 TABLET: TABLET at 08:48

## 2023-12-18 RX ADMIN — METOCLOPRAMIDE HYDROCHLORIDE 10 MG: 5 SOLUTION ORAL at 22:45

## 2023-12-18 RX ADMIN — HYDROCORTISONE 15 MG: 10 TABLET ORAL at 18:02

## 2023-12-18 RX ADMIN — LEVOFLOXACIN 500 MG: 500 INJECTION, SOLUTION INTRAVENOUS at 13:55

## 2023-12-18 RX ADMIN — BRIVARACETAM 100 MG: 10 SOLUTION ORAL at 09:57

## 2023-12-18 ASSESSMENT — ACTIVITIES OF DAILY LIVING (ADL)
ADLS_ACUITY_SCORE: 45
ADLS_ACUITY_SCORE: 43
ADLS_ACUITY_SCORE: 37
ADLS_ACUITY_SCORE: 43
ADLS_ACUITY_SCORE: 43
ADLS_ACUITY_SCORE: 37
ADLS_ACUITY_SCORE: 43
ADLS_ACUITY_SCORE: 53
ADLS_ACUITY_SCORE: 43
DEPENDENT_IADLS:: CLEANING;COOKING;LAUNDRY;SHOPPING;MEAL PREPARATION;MEDICATION MANAGEMENT;MONEY MANAGEMENT;TRANSPORTATION;INCONTINENCE
ADLS_ACUITY_SCORE: 43
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
## 2023-12-18 NOTE — DISCHARGE INSTRUCTIONS
"WOUND CARES  Location: buttock   Care: provided qshift   1. Cleanse with each incontinence episode with incontinence cleanser and soft dry wipes. No need to remove all old triad paste, just wipe off soiled top layer and reapply more (use mineral oil if complete removal is desired)   2. Apply Triad paste (or like product) to buttock and perianal   3. Continue to use cardinal covidien pads for incontinence, do not apply diapers when in bed     Location: right medial heel   Care: provided daily by primary RN   1. Cleanse daily with normal saline and 4x4\" guaze, pat dry   2. Apply skin prep to blister and surrounding skin, let dry   3. Cover with 4x4\" bordered foam dressing. Ok to lift and reapply after routine assessments. Ok to use each mepilex up to 5 days if not saturated with drainage touching three corners.   4. Apply prevalon heel boots per manufacture instructions while in bed and send home with patient on discharge     NUTRITION DISCHARGE INSTRUCTIONS:   Jevity 1.5 - 5 cartons/day (~1185 mL formula daily)   Flush 150 mL of free water 6x daily (or total of 900 mL additional fluid daily)  "

## 2023-12-18 NOTE — CONSULTS
"[facility] Nurse Inpatient Assessment     Consulted for: Wound sacral     Summary: patient with POA on previous admissions buttock skin damage related to incontinence, stable 12/18    Patient History (according to provider note(s):      \"63-year-old male with history of traumatic brain injury in the past causing right-sided spastic hemiplegia, patient nonverbal, GERD, history of DVT in the past, seizure disorder, hypothyroidism history of cardiac arrest in the past, panhypopituitarism, was recently admitted in the hospital from 11/29/2023 to 12/8/2023 for sepsis secondary to MRSA pneumonia now bring to the ED with complaint of low blood pressure shortness of breath found to be hypoxic at scene.\"    Assessment:      Areas visualized during today's visit: Focused: and Sacrum/coccyx    Wound location: buttock sacrum     Last photo: 12/18  Wound due to: Moisture Associated Skin Damage (MASD)  Wound history/plan of care: found with criticaid paste and mepilex sacral in use   Wound base:  epidermis and dermis,  blanchable , non-blanchable, and erythema     Palpation of the wound bed: normal      Drainage: none     Description of drainage: none     Measurements (length x width x depth, in cm): without measurable open area noted       Tunneling: N/A     Undermining: N/A  Periwound skin: Dry/scaly and Erythema- blanchable      Color: pink and red      Temperature: normal   Odor: none  Pain: no grimacing or signs of discomfort, none  Pain interventions prior to dressing change: patient tolerated well  Treatment goal: Heal  and Protection  STATUS: initial assessment  Supplies ordered: gathered, at bedside, and supplies stored on unit       Treatment Plan:       Wound care  Start:  12/18/23 1318,   EVERY SHIFT,   Routine        Comments: Location: buttock  Care: provided qshift by primary RN  1. Cleanse with each incontinence episode with sarah cleanse and protect spray and soft dry wipes. No need " "to remove all old triad paste, just wipe off soiled top layer and reapply more (use mineral oil stocked in the pharmacy if complete removal is desired)  2. Apply Triad paste (#[id number]) to buttock and perianal  3. Continue to use primofit and cardinal covidien pads for incontinence, do not apply diapers when in bed          Skin care precautions  Start:  12/18/23 1321,   EFFECTIVE NOW,   Routine        Comments: Pressure Injury Prevention (PIP) Plan:  -If patient is declining pressure injury prevention interventions: Explore reason why and address patient's concerns, Educate on pressure injury risk and prevention intervention(s), If patient is still declining, document \"informed refusal\" , and Ensure Care team is aware ( provider, charge nurse, etc)  -Mattress: Follow bed algorithm, reassess daily and order specialty mattress, if indicated.  -HOB: Maintain at or below 30 degrees, unless contraindicated  -Repositioning in bed: Every 1-2 hours , Left/right positioning; avoid supine, Raise foot of bed prior to raising head of bed, to reduce patient sliding down (shear), and Frequent microturns using TAPS wedges, as patient tolerates  -Heels: Keep elevated off mattress and Pillows under calves  -Protective Dressing: triad paste  -Positioning Equipment: Z-Aguila positioner (#[id number]-medium or #[id number]) to help maintain side lying position.  -Chair positioning: Chair cushion (#[id number]) , Assist patient to reposition hourly, and Do NOT use a donut for sitting (this increases pressure to smaller area and creates a higher potential for injury)    -Moisture Management: Perineal cleansing /protection: Follow Incontinence Protocol, Avoid brief in bed, Clean and dry skin folds with bathing , and Moisturize dry skin  -Under Devices: Inspect skin under all medical devices during skin inspection , Ensure tubes are stabilized without tension, and Ensure patient is not lying on medical devices or equipment when repositioned    "     Orders: Written    RECOMMEND PRIMARY TEAM ORDER: None, at this time  Education provided: importance of repositioning, plan of care, Moisture management, Hygiene, and Off-loading pressure  Discussed plan of care with: Patient and Nurse  [facility] nurse follow-up plan: weekly  Notify WO if wound(s) deteriorate.  Nursing to notify the Provider(s) and re-consult the [facility] Nurse if new skin concern.    DATA:     Current support surface: Standard  Low air loss (ZULY pump, Isolibrium, Pulsate, skin guard, etc)  Containment of urine/stool: Incontinence Protocol, Incontinent pad in bed, and Primofit external catheter  BMI: Body mass index is 22.9 kg/m .   Active diet order: Orders Placed This Encounter      NPO for Medical/Clinical Reasons Except for: Meds     Output: I/O last 3 completed shifts:  In: 762 [I.V.:672; NG/GT:90]  Out: 400 [Urine:400]     Labs:   Recent Labs   Lab 12/18/23  0747   ALBUMIN 3.0*   HGB 11.7*   WBC 11.4*     Pressure injury risk assessment:   Sensory Perception: 2-->very limited  Moisture: 2-->very moist  Activity: 1-->bedfast  Mobility: 1-->completely immobile  Nutrition: 2-->probably inadequate  Friction and Shear: 2-->potential problem  Ricci Score: 10    Shellie CWOCN   1st choice: Securely message with [employer] ([facility] [employer])   (2nd option: [facility] Office Phone [phone], messages checked periodically Mon-Fri 8a-4p)

## 2023-12-18 NOTE — PLAN OF CARE
Physical Therapy: Orders received. Chart reviewed and discussed with care team.? Physical Therapy not indicated due to pt moving at baseline of lift dependent, 24/7 cares from PCA and mother. Per discussion with RN, pt does not stand at baseline but his mother would like him to try. Appears that pt would benefit from HCPT or OPPT services to make progress on this goal.  Pt does not have discharge planning needs and nursing can address/maintain IP mobility needs as pt is at baseline. ? Defer discharge recommendations to medical team.? Will complete orders.

## 2023-12-18 NOTE — CONSULTS
"CLINICAL NUTRITION SERVICES  -  ASSESSMENT NOTE    RECOMMENDATIONS FOR MD/PROVIDER TO ORDER:   - Phos tends to trend low, consider TID NeutraPhos replacements. Do not suspect related to refeeding. Pt is already on the highest phos-containing formula.    Recommendations Ordered by Registered Dietitian (RD):   - Ordered Phos Add-On - returned low at 1.2. Replace per protocol.     - Once Phos replaced, start TF as follows:   Jevity 1.5 @ 55 mL/hr x 22 hours (held 1 hour before and 1 hour after levothyroxine).  Provides 1815 kcal (25 kcal/kg), 77 g pro (1.1 g/kg), 261 g CHO, 25 g fiber, 920 mL free water   - Start TF @ 35 mL/hr. After 4 hours if good tolerance, advance to goal.      Malnutrition:  Patient does not meet two of the criteria necessary for diagnosing malnutrition     REASON FOR ASSESSMENT  Keyon Farias is a 63 year old male seen by Registered Dietitian for Provider Order - Registered Dietitian to Assess and Order TF per Medical Nutrition Therapy Protocol and Nutrition Admission Risk Screen Received -   Have you recently lost weight without trying ? - \"unsure\"  Have you been eating poorly due to a decreased appetite ?- \"no\"    NUTRITION HISTORY  - Information obtained from chart review. Pt is well-known to clinical nutrition service given frequent admissions.   - H/o TBI, right-sided spastic hemiplegia, nonverbal, GERD, DVT, seizure disorder, hypothyroidism, w/o cardiac arrest, panhypopituitarism.   - Requires Tube Feed at baseline to meet hydration and nutrition needs. GJ-tube in place, feeds via jejunostomy.     - At home, pt's mother provides ~5.5 cartons of Jevity 1.5 daily. Provides 1304 mL, 1953 kcal, 83 g protein, 281 g CHO, 27 g fiber daily.   - During hospitalizations, we typically order a cyclic TF schedule to run over 22 hours, holding for 1 hour prior and post Levothyroxine dosing.   - Pt has a history of low Phos levels, at one time he was taking NeutraPhos TID at baseline. Per recent discussion " "with patient's mother Savannah (10/27) - she no longer orders NeutraPhos as it wasn't covered and became too expensive. Will monitor phos levels this admission.       CURRENT NUTRITION ORDERS  Diet Order:     NPO     Current Intake/Tolerance:  Nothing since admission yesterday.     NUTRITION FOCUSED PHYSICAL ASSESSMENT FOR DIAGNOSING MALNUTRITION)  Yes          Observed:    No nutrition-related physical findings observed    Obtained from Chart/Interdisciplinary Team:  Blanchable redness to left heel, coccyx, buttock, groin.     ANTHROPOMETRICS  Height: 5' 10\"  Weight: 159 lbs 9.81 oz (72.4 kg)   Body mass index is 22.9 kg/m .  Weight Status:  Normal BMI  IBW: 75.5 kg   % IBW: 96%  Weight History: wt trends can be difficult to follow, suspect differences mainly related to differences in scales/reliance on bed-scales, fluid status, reported vs measured weights. Currently appears stable from wt in late October.    Wt Readings from Last 30 Encounters:   12/18/23 72.4 kg (159 lb 9.8 oz)   12/01/23 69.8 kg (153 lb 14.1 oz)   11/26/23 74.8 kg (165 lb)   11/07/23 74.8 kg (165 lb)   10/26/23 70.8 kg (156 lb 1.4 oz)   10/04/23 83.9 kg (185 lb)   10/03/23 84.2 kg (185 lb 10 oz)   09/21/23 77.1 kg (170 lb)   09/12/23 76.8 kg (169 lb 5 oz)   08/12/23 78.4 kg (172 lb 13.5 oz)   05/10/23 72.6 kg (160 lb)   04/06/23 79.4 kg (175 lb)   03/10/23 73.9 kg (163 lb)   02/15/23 73.9 kg (163 lb)   01/23/23 63.5 kg (140 lb)   01/09/23 63.5 kg (140 lb)     LABS  Labs reviewed  Na 133 (L) -- up from 131 on admission    MEDICATIONS  Medications reviewed  Levothyroxine daily @ 0600  Reglan 10 mg 4x daily   Thera vit daily, Vitamin C, Vitamin D  D5 + NaCl + KCl IVF @ 100 mL/hr --> provides 408 kcal daily from dextrose     ASSESSED NUTRITION NEEDS PER APPROVED PRACTICE GUIDELINES:  Dosing Weight 72.4 kg   Estimated Energy Needs: 8984-0693 kcals (25-30 Kcal/Kg)  Justification: maintenance  Estimated Protein Needs:  grams protein (1.2-1.5 g " pro/Kg)  Justification: preservation of lean body mass  Estimated Fluid Needs: 1 mL/kcal   Justification: maintenance    MALNUTRITION:  % Weight Loss:  None noted  % Intake:  Decreased intake does not meet criteria for malnutrition - TF held w/ admission   Subcutaneous Fat Loss:  None observed  Muscle Loss:  None observed  Fluid Retention:  None noted    Malnutrition Diagnosis: Patient does not meet two of the above criteria necessary for diagnosing malnutrition    NUTRITION DIAGNOSIS:  Inadequate protein-energy intake related to transition in care as evidenced by TF held for hospitalization.     NUTRITION INTERVENTIONS  Recommendations / Nutrition Prescription  - Ordered Phos Add-On - returned low at 1.2. Replace per protocol.     Jevity 1.5 @ 55 x 22 hours (hold 1 hour before and 1 hour after levothyroxine).   Provides 1815 kcal (25 kcal/kg), 77 g pro (1.1 g/kg), 261 g CHO, 25 g fiber, 920 mL free water     - Start TF @ 35 mL/hr. After 4 hours if good tolerance, advance to goal.     Implementation  Nutrition education: Per Provider order if indicated   EN Composition, EN Schedule, and Feeding Tube Flush    Nutrition Goals  TF @ goal to provide % estimated needs.     MONITORING AND EVALUATION:  Progress towards goals will be monitored and evaluated per protocol and Practice Guidelines    Marisel Fernández RD, LD  Pager: [phone]  Weekend Pager: [phone]

## 2023-12-18 NOTE — PLAN OF CARE
OT: Order received, chart reviewed and discussed with care team. At baseline, patient is lift dependent and has 24/7 assist with all cares from PCA and mother per RN. Patient with no acute care OT needs.  Defer discharge recommendations to care team. Will complete OT orders.

## 2023-12-18 NOTE — PLAN OF CARE
Goal Outcome Evaluation:      Plan of Care Reviewed With: patient, caregiver    Overall Patient Progress: improvingOverall Patient Progress: improving     Summary:    DATE & TIME: 12/1/23 7795-2388    Cognitive Concerns/ Orientation : Pt alert, nonverbal. Unable to assess orientation. Pt will make noises and when more awake will give an occasional thumbs up and smile.    BEHAVIOR & AGGRESSION TOOL COLOR: Green   ABNL VS/O2: VSS, now on RA; HR 's, BP soft 97/52   MOBILITY: Lift, fall risk. Reposition q2h.   PAIN MANAGMENT: Pain to feet with cares  DIET: NPO; to start tube feeding when phos replcement is complete which will be 1830  BOWEL/BLADDER: Incontinent of bowel and bladder. Male external catheter in place, good output. No BM this shift.  ABNL LAB/BG: Na 133, WBC 11.4, phos 1.2 - replacement in process  DRAIN/DEVICES: D5/NS/20K 100cc/hr. GJ tube clamped  TELEMETRY RHYTHM: Sinus rhythm  SKIN: Prevalon boots to feet, coccyx, buttock are red and excoriated - see WOC nurse note;  Scattered scabs and bruising.   TESTS/PROCEDURES:   D/C DAY/GOALS/PLACE: Discharge pending progress  OTHER IMPORTANT INFO: Neuros at baseline for patient. Right arm contracted, minimal movement with bilateral legs. CMS intact except at times extremities cool and unable to assess sensation. Lung sounds diminished with infrequent, productive cough. Pt swallows phlegm after coughing or swabs used to retrieve mucous orally. Bilateral eyes crusty with discharge, cleaned with wet washcloth.

## 2023-12-18 NOTE — PROGRESS NOTES
RECEIVING UNIT ED HANDOFF REVIEW    ED Nurse Handoff Report was reviewed by: Gracie Gonzalez RN on December 17, 2023 at 11:35 PM

## 2023-12-18 NOTE — PLAN OF CARE
DATE & TIME: 12/17/23 8585-8780    Cognitive Concerns/ Orientation : Pt alert, nonverbal. Unable to assess orientation. Pt will make noises and when more awake will give an occasional thumbs up and smile.    BEHAVIOR & AGGRESSION TOOL COLOR: Green   ABNL VS/O2: VSS on 2L oxymask, HR tachy at times 110s and RR 20s.   MOBILITY: Lift, fall risk. Reposition q2h.   PAIN MANAGMENT: No signs of pain  DIET: NPO  BOWEL/BLADDER: Incontinent of bowel and bladder. Male external catheter in place, good output. No BM this shift.  ABNL LAB/BG: Na 131/Cr 1.20/Ca 8.7/Lactic acid 3.2/WBC 13.5/K 2.8 (awaiting K recheck to start replacement protocol)  DRAIN/DEVICES: D5/NS/20K 100cc/hr. GJ tube clamped  TELEMETRY RHYTHM: Sinus rhythm  SKIN: Blanchable redness to left heel (Prevalon boots applied to feet), coccyx, buttock and groin. Pt arrived to floor with barrier cream on buttock open area on buttock, attempted to clean and then placed Mepilex on buttock. Scattered scabs and bruising.   TESTS/PROCEDURES: WOC/Nutrition/PT/OT consult  D/C DAY/GOALS/PLACE: Discharge pending progress  OTHER IMPORTANT INFO: Neuros at baseline for patient. Right arm contracted, minimal movement with bilateral legs. CMS intact except at times extremities cool and unable to assess sensation. Lung sounds diminished/coarse with infrequent, productive cough. Pt swallows phlegm after coughing. Bilateral eyes crusty with discharge, cleaned with wet washcloth.

## 2023-12-18 NOTE — TELEPHONE ENCOUNTER
Patients mom, who is on C2C,  calling wanting refills. Writer directed her to get brivaracetam from neurology and to wait until patient is out of hospital before adding further medicaitons as she was requesting a refill on a potassium medication from a year ago. Patients mom verbalized understanding.
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
## 2023-12-18 NOTE — PROGRESS NOTES
"[facility]    Hospitalist Progress Note      Assessment & Plan   Keyon Farias is a 63 year old male was admitted on 12/17/2023 with history of traumatic brain injury in the past causing right-sided spastic hemiplegia, patient nonverbal, GERD, history of DVT in the past, seizure disorder, hypothyroidism history of cardiac arrest in the past, panhypopituitarism, was recently admitted in the hospital from 11/29/2023 to 12/8/2023 for sepsis secondary to MRSA pneumonia now bring to the ED with complaint of low blood pressure shortness of breath found to be hypoxic at scene     Acute hypoxic respiratory failure  Possible aspiration pneumonia/pneumonitis  Severe sepsis secondary to possible aspiration pneumonia  63-year-old male with history of traumatic brain injury, spastic hemiplegia on tube feeding, presented with low blood pressure, hypoxia and shortness of breath.  Most likely he is aspirated after his mother given him bolus of 1 L of water with salt in it causing acute hypoxia, and possibly aspirating even before that.  Chest x-ray reviewed does not show any acute infiltrate or congestive changes.  No pneumothorax.  He was given 30 mill per KG of IV normal saline bolus total of 2 L, with resolution of his hypotension.  continue  IV normal saline with potassium at 100 mill per hour.  Will repeat his lactic acid every 4 hours unfortunately not repeated; now ordered.   Given recent history of MRSA pneumonia, history of Zosyn resistant Pseudomonas on admission started  on IV vancomycin, levofloxacin and Flagyl to cover for aspiration pneumonia.  Blood cultures NGTD.   procalcitonin 0.13   Keep him on supplemental oxygen to keep saturation 90% or above. NPO x meds.  ID consult; see note 12/18/2023, \"no clear signs of infection\" Sarah Shipman, IV metronidazole and observe off antibiotics.  Follow cultures, fever and WBC.  Preponderance of information suggest recurrent aspiration pneumonitis, " given water with salt per mother.  Will initiate ST evaluation and if confirms dysphagia to provide data to mother not to give p.o.'s and adhere to strict n.p.o. status due to recurrent aspiration, continue TF and free water.     Panhypopituitarism  Hypothyroidism  Patient history of panhypopituitarism he is on testosterone supplements, hydrocortisone, and levothyroxine.  Given his low blood pressure with sepsis we will give him a stress dose of hydrocortisone 100 mg x 3 doses and then resume his oral hydrocortisone 15 mg in the morning and 7.5 mg in the evening  Continue levothyroxine  He is on Depo testosterone every week hold it while in the hospital.        Acute renal failure  Hyponatremia  Hypokalemia  Metabolic alkalosis  Acute renal failure is likely prerenal secondary to low blood pressure and dehydration.  He was resuscitated with IV fluids agree with that we will continue IV normal saline at this time as mentioned above, repeat the labs in the morning.  Sodium low most likely because of dehydration and increased water intake, by history of low sodium in the past as well.  Continue to monitor and repeat labs in the morning.  For his hypokalemia we will replace potassium and keep him on potassium placement protocol.  Patient is on sodium bicarb replacement at home, most likely the reason for his metabolic alkalosis, will hold sodium bicarb tablets for now, most likely will need some but may be at a lower dose in the future     Epilepsy/seizure disorder  Traumatic brain injury  Right-sided spastic hemiplegia  Nonverbal  Patient history of TBI in the past causing multiple medical problem, right-sided hemiplegic, nonverbal and have seizure disorder.  He is on Tegretol we will continue with that no recent history of seizures.  Will have PT and OT evaluate the patient as well.  Uses however left, mother feels that his legs can be strengthen to assist with transfers, will have PT OT assess to see if this is a  realistic goal or again to provide data to mother that he will always require assistance/[place] for transfers.     Chronic dysphagia  Status post GJ tube placement  Keep him n.p.o., restart TF.     History of GERD  IV Protonix daily for now then resume p.o.    DVT Prophylaxis: Enoxaparin (Lovenox) SQ  Code Status: Full Code     Expected Discharge Date: 12/26/2023      Destination: home with help/services         Cass Hoang MD  Text Page (7am - 6pm, M-F)    Total time 50 minutes for today 12/18/2023 :   time consisted of the following, assuming Patient care with review of records including labs, imaging results, medications, interdisciplinary notes; examination of Patient;  and completing documentation and orders.  Care Management included counseling/discussion with Patient and Mother regarding current condition including TBI, sepsis, hypotension and likely aspiration and Coordination of Care time with Nursing  and Specialists, ID regarding care plan, management and surveillance; as above. .     Interval History   Limited history of patient, nods [nonverbal], majority of history from mother that suggest aspiration after she gave him a liter of fluids/water.    SH: No tobacco.  PTA lives with mother, primary caregiver, Catarina lift at home.    ROS: Complete ROS negative except as above.     -Data reviewed today: I reviewed all new labs and imaging results over the last 24 hours.    Physical Exam   Temp: 97.4  F (36.3  C) Temp src: Axillary BP: 105/53 Pulse: 106   Resp: 18 SpO2: 99 % O2 Device: None (Room air) Oxygen Delivery: 2 LPM  Vitals:    12/17/23 1228 12/18/23 0440   Weight: 70.9 kg (156 lb 4.9 oz) 72.4 kg (159 lb 9.8 oz)       General/Constitutional:   NAD, awake, nonverbal,  calm, cooperative    Chest/Respiratory: Respirations nonlabored room air  Cardiovascular: no murmur appreciated.  LE edema none  Gastrointestinal/Abdomen:  soft, nontender, no rebound, guarding or other peritoneal signs.  MSK; LUCIANA  deformity; reported pain on touch to R Leg, [Mother states chronic, hypersensitive]   Neuro.  Gross motor tested, spastic hemiplegia  Psych affect calm      Medications    dextrose      dextrose 5% and 0.9% NaCl + KCL 20 mEq/L 100 mL/hr at 12/17/23 2108      Brivaracetam  100 mg Oral or Feeding Tube BID    carBAMazepine  100 mg Per Feeding Tube Daily    carBAMazepine  150 mg Oral or Feeding Tube TID    enoxaparin ANTICOAGULANT  40 mg Subcutaneous Q24H    hydrocortisone  15 mg Oral or Feeding Tube QAM    hydrocortisone  15 mg Oral or Feeding Tube QPM    levothyroxine  125 mcg Oral or Feeding Tube Daily    metoclopramide  10 mg Oral or Feeding Tube 4x Daily AC & HS    multivitamin, therapeutic  1 tablet Per Feeding Tube Daily    pantoprazole  40 mg Intravenous Daily with breakfast    potassium & sodium phosphates  2 packet Oral or Feeding Tube Q4H    sodium chloride (PF)  3 mL Intracatheter Q8H    sodium chloride (PF)  3 mL Intracatheter Q8H    vitamin C  1,000 mg Oral or Feeding Tube Daily    vitamin D3  50 mcg Per Feeding Tube Daily       Data   Recent Labs   Lab 12/18/23  0747 12/17/23  1242   WBC 11.4* 13.5*   HGB 11.7* 13.7   MCV 89 90   * 223   * 131*   POTASSIUM 3.6 2.8*   CHLORIDE 98 85*   CO2 27 37*   BUN 13.6 24.9*   CR 0.74 1.20*   ANIONGAP 8 9   STEVE 7.6* 8.7*   * 144*   ALBUMIN 3.0* 3.6   PROTTOTAL 5.9* 7.3   BILITOTAL 0.4 0.7   ALKPHOS 72 101   ALT 22 24   AST 75* 68*

## 2023-12-19 LAB
ANION GAP SERPL CALCULATED.3IONS-SCNC: 6 MMOL/L (ref 7–15)
BACTERIA UR CULT: NO GROWTH
BUN SERPL-MCNC: 10.1 MG/DL (ref 8–23)
CALCIUM SERPL-MCNC: 7.8 MG/DL (ref 8.8–10.2)
CHLORIDE SERPL-SCNC: 108 MMOL/L (ref 98–107)
CREAT SERPL-MCNC: 0.71 MG/DL (ref 0.67–1.17)
DEPRECATED HCO3 PLAS-SCNC: 29 MMOL/L (ref 22–29)
EGFRCR SERPLBLD CKD-EPI 2021: >90 ML/MIN/1.73M2
ERYTHROCYTE [DISTWIDTH] IN BLOOD BY AUTOMATED COUNT: 14 % (ref 10–15)
GLUCOSE BLDC GLUCOMTR-MCNC: 133 MG/DL (ref 70–99)
GLUCOSE SERPL-MCNC: 144 MG/DL (ref 70–99)
HCT VFR BLD AUTO: 34.3 % (ref 40–53)
HGB BLD-MCNC: 11.1 G/DL (ref 13.3–17.7)
LACTATE SERPL-SCNC: 1.2 MMOL/L (ref 0.7–2)
MAGNESIUM SERPL-MCNC: 2 MG/DL (ref 1.7–2.3)
MCH RBC QN AUTO: 29.8 PG (ref 26.5–33)
MCHC RBC AUTO-ENTMCNC: 32.4 G/DL (ref 31.5–36.5)
MCV RBC AUTO: 92 FL (ref 78–100)
PHOSPHATE SERPL-MCNC: 2.1 MG/DL (ref 2.5–4.5)
PLATELET # BLD AUTO: 157 10E3/UL (ref 150–450)
POTASSIUM SERPL-SCNC: 3.7 MMOL/L (ref 3.4–5.3)
RBC # BLD AUTO: 3.73 10E6/UL (ref 4.4–5.9)
SODIUM SERPL-SCNC: 143 MMOL/L (ref 135–145)
WBC # BLD AUTO: 8.1 10E3/UL (ref 4–11)

## 2023-12-19 PROCEDURE — 36415 COLL VENOUS BLD VENIPUNCTURE: CPT | Performed by: HOSPITALIST

## 2023-12-19 PROCEDURE — 250N000009 HC RX 250: Performed by: HOSPITALIST

## 2023-12-19 PROCEDURE — 258N000003 HC RX IP 258 OP 636: Performed by: HOSPITALIST

## 2023-12-19 PROCEDURE — 99233 SBSQ HOSP IP/OBS HIGH 50: CPT | Performed by: HOSPITALIST

## 2023-12-19 PROCEDURE — 250N000011 HC RX IP 250 OP 636: Performed by: INTERNAL MEDICINE

## 2023-12-19 PROCEDURE — 120N000001 HC R&B MED SURG/OB

## 2023-12-19 PROCEDURE — 83735 ASSAY OF MAGNESIUM: CPT | Performed by: INTERNAL MEDICINE

## 2023-12-19 PROCEDURE — 85027 COMPLETE CBC AUTOMATED: CPT | Performed by: HOSPITALIST

## 2023-12-19 PROCEDURE — 250N000013 HC RX MED GY IP 250 OP 250 PS 637: Performed by: INTERNAL MEDICINE

## 2023-12-19 PROCEDURE — 83605 ASSAY OF LACTIC ACID: CPT | Performed by: HOSPITALIST

## 2023-12-19 PROCEDURE — 99232 SBSQ HOSP IP/OBS MODERATE 35: CPT | Performed by: PHYSICIAN ASSISTANT

## 2023-12-19 PROCEDURE — C9113 INJ PANTOPRAZOLE SODIUM, VIA: HCPCS | Performed by: INTERNAL MEDICINE

## 2023-12-19 PROCEDURE — 80048 BASIC METABOLIC PNL TOTAL CA: CPT | Performed by: INTERNAL MEDICINE

## 2023-12-19 PROCEDURE — 250N000013 HC RX MED GY IP 250 OP 250 PS 637: Performed by: HOSPITALIST

## 2023-12-19 PROCEDURE — 84100 ASSAY OF PHOSPHORUS: CPT | Performed by: INTERNAL MEDICINE

## 2023-12-19 RX ORDER — METOCLOPRAMIDE HYDROCHLORIDE 5 MG/5ML
10 SOLUTION ORAL
Status: DISCONTINUED | OUTPATIENT
Start: 2023-12-19 | End: 2023-12-20

## 2023-12-19 RX ADMIN — PANTOPRAZOLE SODIUM 40 MG: 40 INJECTION, POWDER, FOR SOLUTION INTRAVENOUS at 07:40

## 2023-12-19 RX ADMIN — HYDROCORTISONE 15 MG: 10 TABLET ORAL at 07:40

## 2023-12-19 RX ADMIN — SODIUM PHOSPHATE, MONOBASIC, MONOHYDRATE AND SODIUM PHOSPHATE, DIBASIC, ANHYDROUS 9 MMOL: 142; 276 INJECTION, SOLUTION INTRAVENOUS at 14:45

## 2023-12-19 RX ADMIN — CARBAMAZEPINE 150 MG: 100 SUSPENSION ORAL at 00:10

## 2023-12-19 RX ADMIN — POTASSIUM & SODIUM PHOSPHATES POWDER PACK 280-160-250 MG 2 PACKET: 280-160-250 PACK at 07:40

## 2023-12-19 RX ADMIN — BRIVARACETAM 100 MG: 10 SOLUTION ORAL at 22:06

## 2023-12-19 RX ADMIN — BRIVARACETAM 100 MG: 10 SOLUTION ORAL at 09:18

## 2023-12-19 RX ADMIN — LEVOTHYROXINE SODIUM 125 MCG: 75 TABLET ORAL at 06:24

## 2023-12-19 RX ADMIN — MULTIVITAMIN TABLET 1 TABLET: TABLET at 07:40

## 2023-12-19 RX ADMIN — POTASSIUM & SODIUM PHOSPHATES POWDER PACK 280-160-250 MG 2 PACKET: 280-160-250 PACK at 00:10

## 2023-12-19 RX ADMIN — CARBAMAZEPINE 100 MG: 100 SUSPENSION ORAL at 17:20

## 2023-12-19 RX ADMIN — OXYCODONE HYDROCHLORIDE AND ACETAMINOPHEN 1000 MG: 500 TABLET ORAL at 07:40

## 2023-12-19 RX ADMIN — CARBAMAZEPINE 150 MG: 100 SUSPENSION ORAL at 06:24

## 2023-12-19 RX ADMIN — POTASSIUM & SODIUM PHOSPHATES POWDER PACK 280-160-250 MG 2 PACKET: 280-160-250 PACK at 05:03

## 2023-12-19 RX ADMIN — ENOXAPARIN SODIUM 40 MG: 40 INJECTION SUBCUTANEOUS at 22:06

## 2023-12-19 RX ADMIN — CARBAMAZEPINE 150 MG: 100 SUSPENSION ORAL at 12:48

## 2023-12-19 RX ADMIN — METOCLOPRAMIDE HYDROCHLORIDE 10 MG: 5 SOLUTION ORAL at 18:57

## 2023-12-19 RX ADMIN — Medication 50 MCG: at 07:40

## 2023-12-19 RX ADMIN — HYDROCORTISONE 15 MG: 10 TABLET ORAL at 17:16

## 2023-12-19 RX ADMIN — METOCLOPRAMIDE HYDROCHLORIDE 10 MG: 5 SOLUTION ORAL at 13:56

## 2023-12-19 ASSESSMENT — ACTIVITIES OF DAILY LIVING (ADL)
ADLS_ACUITY_SCORE: 61
ADLS_ACUITY_SCORE: 53
ADLS_ACUITY_SCORE: 61
ADLS_ACUITY_SCORE: 53
ADLS_ACUITY_SCORE: 61
ADLS_ACUITY_SCORE: 53
ADLS_ACUITY_SCORE: 61

## 2023-12-19 NOTE — PLAN OF CARE
SLP: Pt will known to this department d/t severe dysphagia and recurrent episodes of aspiration pneumonia for several years. Strict NPO with oral cares and suction have been recommended. Per notes and previous discussions with this SLP, mom has occasionally provided PO trials. No indication for repeat swallow evaluation at this time with no change in status and poor prognosis for pt being able to start PO diet without continued high risk of aspiration.     Would defer further discussion on goals of care to MD. Could consider Palliative Care consult and further dive into mom's goals of care specially related to swallowing if she wishes to continue providing PO trials.

## 2023-12-19 NOTE — PLAN OF CARE
Goal Outcome Evaluation:    Summary:    DATE & TIME: 12/18/23 7542-6339    Cognitive Concerns/ Orientation : Pt alert, nonverbal. Unable to assess orientation. Pt Occasionally smiling and making incomprehensible sounds intermittently    BEHAVIOR & AGGRESSION TOOL COLOR: Green   ABNL VS/O2: VSS, now on RA; HR 's - tachy at times.   MOBILITY: Lift, fall risk. Reposition q2h.   PAIN MANAGMENT: Verbal indicators present while adjusting BLE for pain to feet with cares/repo  DIET: NPO; TF at goal rate 55 ml/hr with q4h FWF 60 ml  BOWEL/BLADDER: Incontinent of bowel and bladder. Male external catheter in place, good output - one large BM  this shift.  ABNL LAB/BG: Na 133, WBC 11.4, phos 1.8- replaced recheck in AM   DRAIN/DEVICES: D5/NS/20K 100cc/hr. GJ tube clamped  TELEMETRY RHYTHM: Sinus rhythm  SKIN: Prevalon boots to feet, coccyx, buttock are red and excoriated - see WOC nurse note;  Scattered scabs and bruising.   TESTS/PROCEDURES:   D/C DAY/GOALS/PLACE: Discharge pending progress  OTHER IMPORTANT INFO: No change to neuros or mobility per pt baseline -RUE contracted, minimal movement with bilateral legs. Lung sounds diminished - *Pt swallows phlegm after coughing* Frequent swabbing performed - pt fairly tolerating.

## 2023-12-19 NOTE — PROGRESS NOTES
"[facility]    Hospitalist Progress Note      Assessment & Plan   Keyon Farias is a 63 year old male was admitted on 12/17/2023 with history of traumatic brain injury in the past causing right-sided spastic hemiplegia, patient nonverbal, GERD, history of DVT in the past, seizure disorder, hypothyroidism history of cardiac arrest in the past, panhypopituitarism, was recently admitted in the hospital from 11/29/2023 to 12/8/2023 for sepsis secondary to MRSA pneumonia now bring to the ED with complaint of low blood pressure shortness of breath found to be hypoxic at scene     Acute hypoxic respiratory failure  Possible aspiration pneumonia/pneumonitis  Severe sepsis secondary to possible aspiration pneumonia  63-year-old male with history of traumatic brain injury, spastic hemiplegia on tube feeding, presented with low blood pressure, hypoxia and shortness of breath.  Most likely he is aspirated after his mother given him bolus of 1 L of water with salt in it causing acute hypoxia, and possibly aspirating even before that.  Chest x-ray reviewed does not show any acute infiltrate or congestive changes.  No pneumothorax.  He was given 30 mill per KG of IV normal saline bolus total of 2 L, with resolution of his hypotension.  continue  IV normal saline with potassium at 100 mill per hour.  Will repeat his lactic acid every 4 hours unfortunately not repeated; now ordered.   Given recent history of MRSA pneumonia, history of Zosyn resistant Pseudomonas on admission started  on IV vancomycin, levofloxacin and Flagyl to cover for aspiration pneumonia.  Blood cultures NGTD.   procalcitonin 0.13   Keep him on supplemental oxygen to keep saturation 90% or above. NPO x meds.  ID consult; see note 12/18/2023, \"no clear signs of infection\" Sarah Shipman, IV metronidazole and observe off antibiotics.  Follow cultures, fever and WBC.  Preponderance of information suggest recurrent aspiration pneumonitis, " given water with salt per mother per admitting hospitalist; currently Mother denies, however yesterday she admits to given Patient po's.    -LA cleared with IVF.   -ST 12/18/2023 confirms patient is strict NPO.    -Due to presentation with recurrent aspiration and mother stating that she is performing exercises in hopes that her son will able to bear weight, transfer and ambulate will initiate Palliative Care consult to patient's current condition and review realistic goals of care.  In talking with nursing since last admission patient has had increased pressure wounds and to notify the mother that with her best cares and intention patient is progressing with pressure injuries that could lead to further infections.       Panhypopituitarism  Hypothyroidism  Patient history of panhypopituitarism he is on testosterone supplements, hydrocortisone, and levothyroxine.  Given his low blood pressure with sepsis we will give him a stress dose of hydrocortisone 100 mg x 3 doses and then resume his oral hydrocortisone 15 mg in the morning and 7.5 mg in the evening  Continue levothyroxine  He is on Depo testosterone every week hold it while in the hospital.        Acute renal failure  Hyponatremia  Hypokalemia  Metabolic alkalosis  Acute renal failure is likely prerenal secondary to low blood pressure and dehydration.  He was resuscitated with IV fluids agree with that we will continue IV normal saline at this time as mentioned above, repeat the labs in the morning.  Sodium low most likely because of dehydration and increased water intake, by history of low sodium in the past as well.  Continue to monitor and repeat labs in the morning.  For his hypokalemia we will replace potassium and keep him on potassium placement protocol.  Patient is on sodium bicarb replacement at home, most likely the reason for his metabolic alkalosis, will hold sodium bicarb tablets for now, most likely will need some but may be at a lower dose in the  future     Epilepsy/seizure disorder  Traumatic brain injury  Right-sided spastic hemiplegia  Nonverbal  Patient history of TBI in the past causing multiple medical problem, right-sided hemiplegic, nonverbal and have seizure disorder.  He is on Tegretol we will continue with that no recent history of seizures.  Will have PT and OT evaluate the patient as well. PTA uses catarina life mother feels that his legs can be strengthen to assist with transfers, will have PT OT assess to see if this is a realistic goal or again to provide data to mother that he will always require assistance/Catarina for transfers.     Chronic dysphagia  Status post GJ tube placement  Keep him n.p.o., restart TF.     History of GERD  IV Protonix daily for now then resume p.o.    DVT Prophylaxis: Enoxaparin (Lovenox) SQ  Code Status: Full Code     Expected Discharge Date: 12/26/2023      Destination: home with help/services         Cass Hoang MD  Text Page (7am - 6pm, M-F)    Total time 50 minutes for today 12/19/2023 :  time consisted of the following, examination of patient, review of records including labs, imaging results, medications, interdisciplinary notes and completing documentation and orders.  Care Management included counseling/discussion with Patient's Mother regarding current condition including aspiration, cares and Palliative Care consult and Coordination of Care time with Nursing and CC re: Pall Care Consult to identify Goals of care and Specialists, ID regarding care plan, management and surveillance, as above. .     Interval History   Limited history from patient, nods, nonverbal.  Mother states she is primary caregiver and wants to continue to perform all patient daily cares.  She was initially reluctant to proceed with palliative Care consult to assist in identifying realistic goals of care, however at conclusion she agreed.      SH: No tobacco.  PTA lives with mother, primary caregiver, Catarina lift at home.    ROS:  Complete ROS negative except as above.     -Data reviewed today: I reviewed all new labs and imaging results over the last 24 hours.    Physical Exam   Temp: 98.2  F (36.8  C) Temp src: Axillary BP: 108/58 Pulse: 101   Resp: 18 SpO2: 98 % O2 Device: None (Room air)    Vitals:    12/17/23 1228 12/18/23 0440   Weight: 70.9 kg (156 lb 4.9 oz) 72.4 kg (159 lb 9.8 oz)       General/Constitutional:   NAD, awake, nonverbal,  calm, cooperative, cachectic  Chest/Respiratory: Respirations nonlabored room air  Cardiovascular: no murmur appreciated.  LE edema none  Gastrointestinal/Abdomen:  soft, nontender, no rebound, guarding or other peritoneal signs.  PEG tube in place  MSK; LE deformity; reported pain on touch to R Leg, [Mother states chronic, hypersensitive]   Skin; pressure wound coccyx and heel  Neuro.  Gross motor tested, spastic hemiplegia  Psych affect calm      Medications    dextrose      dextrose 5% and 0.9% NaCl + KCL 20 mEq/L 100 mL/hr at 12/17/23 2108      Brivaracetam  100 mg Oral or Feeding Tube BID    carBAMazepine  100 mg Per Feeding Tube Daily    carBAMazepine  150 mg Oral or Feeding Tube TID    enoxaparin ANTICOAGULANT  40 mg Subcutaneous Q24H    hydrocortisone  15 mg Oral or Feeding Tube QAM    hydrocortisone  15 mg Oral or Feeding Tube QPM    levothyroxine  125 mcg Oral or Feeding Tube Daily    metoclopramide  10 mg Oral or Feeding Tube 4x Daily AC & HS    multivitamin, therapeutic  1 tablet Per Feeding Tube Daily    pantoprazole  40 mg Intravenous Daily with breakfast    sodium chloride (PF)  3 mL Intracatheter Q8H    vitamin C  1,000 mg Oral or Feeding Tube Daily    vitamin D3  50 mcg Per Feeding Tube Daily       Data   Recent Labs   Lab 12/19/23  1151 12/19/23  0159 12/18/23  0747 12/17/23  1242   WBC 8.1  --  11.4* 13.5*   HGB 11.1*  --  11.7* 13.7   MCV 92  --  89 90     --  142* 223     --  133* 131*   POTASSIUM 3.7  --  3.6 2.8*   CHLORIDE 108*  --  98 85*   CO2 29  --  27 37*   BUN  10.1  --  13.6 24.9*   CR 0.71  --  0.74 1.20*   ANIONGAP 6*  --  8 9   STEVE 7.8*  --  7.6* 8.7*   * 133* 153* 144*   ALBUMIN  --   --  3.0* 3.6   PROTTOTAL  --   --  5.9* 7.3   BILITOTAL  --   --  0.4 0.7   ALKPHOS  --   --  72 101   ALT  --   --  22 24   AST  --   --  75* 68*

## 2023-12-19 NOTE — PROGRESS NOTES
CLINICAL NUTRITION SERVICES BRIEF NOTE:  Following for tube feeds. Reviewed chart for TF tolerance.     Per chart, TF running at goal since last night.     Labs:   Recent Labs   Lab Test 12/19/23  1151 12/18/23  0747 12/17/23  1242 12/07/23  1518 12/06/23  1533   POTASSIUM 3.7 3.6 2.8* 4.5 4.7     Recent Labs   Lab Test 12/19/23  1151 12/18/23  2306 12/18/23  0747 12/07/23  1518 12/06/23  0852   PHOS 2.1* 1.8* 1.2* 2.5 2.5     Recent Labs   Lab Test 12/19/23  1151 12/01/23  1024 11/29/23  1510 10/28/23  0912 10/27/23  1203   MAG 2.0 2.4* 2.5* 2.2 2.2     Recent Labs   Lab Test 12/19/23  1151 12/18/23  0747 12/17/23  1242 12/03/23  0727 12/02/23  0509    133* 131* 141 147*     Recent Labs   Lab 12/19/23  1151 12/19/23  0159 12/18/23  0747 12/17/23  1242   * 133* 153* 144*     Phos was replaced yesterday with recheck 1.8 (L). Today's value up again, 2.1 (L). NeutraPhos replacement protocol ordered. Consider adding NeutraPhos back on TID at baseline to prevent low values.     SKIN:   12/18 - WOCN:   Buttock/Sacrum --> MASD. Skin damage is stable.   12/19 - MD note today indicates pressure injuries to coccyx and heel.       Noted in ID note that pt's mother Savannah had some questions regarding Tube Feeds. Attempted to see patient with his mother Savannah this afternoon, however she had already left. Attempted to call her, phone was off. Will continue attempts at touching base with Savannah regarding an appropriate feeding regimen for home, will also enter discharge instructions.     RD will continue to follow.    Marisel Fernández RD, LD  Pager: [phone]  Weekend Pager: [phone]

## 2023-12-19 NOTE — PROGRESS NOTES
[facility]    Infectious Disease Progress Note    Date of Service (when I saw the patient): 12/19/2023     Assessment & Plan   Keyon Farias is a 63 year old who was admitted on 12/17/2023.     Impression:  64 yo male with history of TBI, non-verbal, GJ tube, seizure disorder, prior cardiac arrest who was recently hospitalized with sepsis thought secondary to MRSA pneumonia. He now presents with acute hypoxic respiratory failure thought secondary to aspiration after his mother gave him a liter of water with pink salt in GJ tube.      -Afebrile. Mild leukocytosis has resolved. Procalcitonin normal.   -Blood cultures and urine culture are no growth to date.  -CXR with clear lungs.   -Oxygen saturation good on room air.  -Started on Levaquin, IV Vancomycin, and Flagyl -> stopped on 12/18/23 as no evidence of acute infection.      Recommendations:   -As no clear signs of infection, would recommend continuing to observe off antibiotics. He likely has pneumonitis from aspiration but no acute infection.   -Maintain strict aspiration precautions.   -Consider having dietician re-educate mother on tube feeds. She has lots of questions and states she hasn't been educated in a long time about it and is worried she is doing something wrong.   -ID will sign off. Please call us back with questions.       Patient and plan discussed with Dr. Sheffield.       ALBERT Francois-MATTHEW      Interval History   No fevers. WBC now normal. Patient smiling. Mother in room.   Labs reviewed   No changes to past medical, social or family history         Physical Exam   Temp: 98.2  F (36.8  C) Temp src: Axillary BP: 108/58 Pulse: 101   Resp: 18 SpO2: 98 % O2 Device: None (Room air)    Vitals:    12/17/23 1228 12/18/23 0440   Weight: 70.9 kg (156 lb 4.9 oz) 72.4 kg (159 lb 9.8 oz)     Vital Signs with Ranges  Temp:  [97.4  F (36.3  C)-98.2  F (36.8  C)] 98.2  F (36.8  C)  Pulse:  [] 101  Resp:  [18-20] 18  BP: ()/(52-58)  108/58  SpO2:  [98 %-99 %] 98 %    Constitutional: Awake, alert, cooperative, no apparent distress  Lungs: Clear to auscultation bilaterally, no crackles or wheezing  Cardiovascular: Regular rate and rhythm, normal S1 and S2, and no murmur noted  Abdomen: Normal bowel sounds, soft, non-distended, non-tender. GJ tube in place.  Skin: No rashes, no cyanosis, no edema  Other:    Medications    dextrose      dextrose 5% and 0.9% NaCl + KCL 20 mEq/L 100 mL/hr at 12/17/23 2108      Brivaracetam  100 mg Oral or Feeding Tube BID    carBAMazepine  100 mg Per Feeding Tube Daily    carBAMazepine  150 mg Oral or Feeding Tube TID    enoxaparin ANTICOAGULANT  40 mg Subcutaneous Q24H    hydrocortisone  15 mg Oral or Feeding Tube QAM    hydrocortisone  15 mg Oral or Feeding Tube QPM    levothyroxine  125 mcg Oral or Feeding Tube Daily    metoclopramide  10 mg Oral or Feeding Tube 4x Daily AC & HS    multivitamin, therapeutic  1 tablet Per Feeding Tube Daily    pantoprazole  40 mg Intravenous Daily with breakfast    sodium chloride (PF)  3 mL Intracatheter Q8H    vitamin C  1,000 mg Oral or Feeding Tube Daily    vitamin D3  50 mcg Per Feeding Tube Daily       Data   All microbiology laboratory data reviewed.  Recent Labs   Lab Test 12/18/23  0747 12/17/23  1242 12/02/23  1147   WBC 11.4* 13.5* 5.6   HGB 11.7* 13.7 11.8*   HCT 34.6* 42.1 36.7*   MCV 89 90 92   * 223 125*     Recent Labs   Lab Test 12/18/23  0747 12/17/23  1242 12/05/23  1109   CR 0.74 1.20* 0.78       Collected Updated Procedure Result Status    12/17/2023 1608 12/17/2023 1645 Asymptomatic COVID-19 Virus (Coronavirus) by PCR Nasopharyngeal [[id number]]    Swab from Nasopharyngeal    Final result Component Value   SARS CoV2 PCR Negative   NEGATIVE: SARS-CoV-2 (COVID-19) RNA not detected, presumed negative.          12/17/2023 1331 12/19/2023 0551 Urine Culture Aerobic Bacterial [[id number]]   Urine, Straight Catheter    Final result Component Value    Culture No Growth             12/17/2023 1242 12/18/2023 1604 Blood Culture Peripheral Blood [[id number]]   Peripheral Blood    Preliminary result Component Value   Culture No growth after 1 day P             12/17/2023 1242 12/18/2023 1604 Blood Culture Peripheral Blood [[id number]]   Peripheral Blood    Preliminary result Component Value   Culture No growth after 1 day P

## 2023-12-20 LAB
ANION GAP SERPL CALCULATED.3IONS-SCNC: 7 MMOL/L (ref 7–15)
BUN SERPL-MCNC: 9 MG/DL (ref 8–23)
CALCIUM SERPL-MCNC: 7.8 MG/DL (ref 8.8–10.2)
CHLORIDE SERPL-SCNC: 107 MMOL/L (ref 98–107)
CREAT SERPL-MCNC: 0.66 MG/DL (ref 0.67–1.17)
CREAT SERPL-MCNC: 0.71 MG/DL (ref 0.67–1.17)
DEPRECATED HCO3 PLAS-SCNC: 25 MMOL/L (ref 22–29)
EGFRCR SERPLBLD CKD-EPI 2021: >90 ML/MIN/1.73M2
EGFRCR SERPLBLD CKD-EPI 2021: >90 ML/MIN/1.73M2
GLUCOSE BLDC GLUCOMTR-MCNC: 111 MG/DL (ref 70–99)
GLUCOSE BLDC GLUCOMTR-MCNC: 79 MG/DL (ref 70–99)
GLUCOSE SERPL-MCNC: 88 MG/DL (ref 70–99)
MAGNESIUM SERPL-MCNC: 1.9 MG/DL (ref 1.7–2.3)
PHOSPHATE SERPL-MCNC: 2.6 MG/DL (ref 2.5–4.5)
PLATELET # BLD AUTO: 167 10E3/UL (ref 150–450)
POTASSIUM SERPL-SCNC: 3.9 MMOL/L (ref 3.4–5.3)
SODIUM SERPL-SCNC: 139 MMOL/L (ref 135–145)

## 2023-12-20 PROCEDURE — 80048 BASIC METABOLIC PNL TOTAL CA: CPT | Performed by: INTERNAL MEDICINE

## 2023-12-20 PROCEDURE — 250N000011 HC RX IP 250 OP 636: Mod: JZ | Performed by: INTERNAL MEDICINE

## 2023-12-20 PROCEDURE — 99232 SBSQ HOSP IP/OBS MODERATE 35: CPT | Performed by: HOSPITALIST

## 2023-12-20 PROCEDURE — G0463 HOSPITAL OUTPT CLINIC VISIT: HCPCS

## 2023-12-20 PROCEDURE — 250N000013 HC RX MED GY IP 250 OP 250 PS 637: Performed by: INTERNAL MEDICINE

## 2023-12-20 PROCEDURE — 83735 ASSAY OF MAGNESIUM: CPT | Performed by: INTERNAL MEDICINE

## 2023-12-20 PROCEDURE — 99223 1ST HOSP IP/OBS HIGH 75: CPT | Performed by: REGISTERED NURSE

## 2023-12-20 PROCEDURE — 120N000001 HC R&B MED SURG/OB

## 2023-12-20 PROCEDURE — 36415 COLL VENOUS BLD VENIPUNCTURE: CPT | Performed by: INTERNAL MEDICINE

## 2023-12-20 PROCEDURE — 84100 ASSAY OF PHOSPHORUS: CPT | Performed by: INTERNAL MEDICINE

## 2023-12-20 PROCEDURE — 85049 AUTOMATED PLATELET COUNT: CPT | Performed by: INTERNAL MEDICINE

## 2023-12-20 RX ORDER — METOCLOPRAMIDE HYDROCHLORIDE 5 MG/5ML
10 SOLUTION ORAL
Status: DISCONTINUED | OUTPATIENT
Start: 2023-12-20 | End: 2023-12-21 | Stop reason: HOSPADM

## 2023-12-20 RX ADMIN — MULTIVITAMIN TABLET 1 TABLET: TABLET at 08:04

## 2023-12-20 RX ADMIN — BRIVARACETAM 100 MG: 10 SOLUTION ORAL at 22:02

## 2023-12-20 RX ADMIN — OXYCODONE HYDROCHLORIDE AND ACETAMINOPHEN 1000 MG: 500 TABLET ORAL at 08:04

## 2023-12-20 RX ADMIN — HYDROCORTISONE 15 MG: 10 TABLET ORAL at 08:04

## 2023-12-20 RX ADMIN — ACETAMINOPHEN 650 MG: 325 TABLET, FILM COATED ORAL at 21:35

## 2023-12-20 RX ADMIN — METOCLOPRAMIDE HYDROCHLORIDE 10 MG: 5 SOLUTION ORAL at 17:28

## 2023-12-20 RX ADMIN — METOCLOPRAMIDE HYDROCHLORIDE 10 MG: 5 SOLUTION ORAL at 13:31

## 2023-12-20 RX ADMIN — LEVOTHYROXINE SODIUM 125 MCG: 75 TABLET ORAL at 06:12

## 2023-12-20 RX ADMIN — Medication 50 MCG: at 08:04

## 2023-12-20 RX ADMIN — METOCLOPRAMIDE HYDROCHLORIDE 10 MG: 5 SOLUTION ORAL at 09:48

## 2023-12-20 RX ADMIN — METOCLOPRAMIDE HYDROCHLORIDE 10 MG: 5 SOLUTION ORAL at 00:18

## 2023-12-20 RX ADMIN — CARBAMAZEPINE 150 MG: 100 SUSPENSION ORAL at 06:12

## 2023-12-20 RX ADMIN — CARBAMAZEPINE 150 MG: 100 SUSPENSION ORAL at 00:18

## 2023-12-20 RX ADMIN — METOCLOPRAMIDE HYDROCHLORIDE 10 MG: 5 SOLUTION ORAL at 21:36

## 2023-12-20 RX ADMIN — BRIVARACETAM 100 MG: 10 SOLUTION ORAL at 09:48

## 2023-12-20 RX ADMIN — CARBAMAZEPINE 150 MG: 100 SUSPENSION ORAL at 13:24

## 2023-12-20 RX ADMIN — HYDROCORTISONE 15 MG: 10 TABLET ORAL at 17:28

## 2023-12-20 RX ADMIN — ENOXAPARIN SODIUM 40 MG: 40 INJECTION SUBCUTANEOUS at 21:36

## 2023-12-20 RX ADMIN — Medication 40 MG: at 06:46

## 2023-12-20 RX ADMIN — CARBAMAZEPINE 100 MG: 100 SUSPENSION ORAL at 17:28

## 2023-12-20 ASSESSMENT — ACTIVITIES OF DAILY LIVING (ADL)
ADLS_ACUITY_SCORE: 61
ADLS_ACUITY_SCORE: 57
ADLS_ACUITY_SCORE: 57
ADLS_ACUITY_SCORE: 61
ADLS_ACUITY_SCORE: 57
ADLS_ACUITY_SCORE: 61
ADLS_ACUITY_SCORE: 61

## 2023-12-20 NOTE — PLAN OF CARE
Goal Outcome Evaluation:DATE & TIME: -12/20/23 4668-4180  Cognitive Concerns/ Orientation : Pt alert, nonverbal, unable to assess orientation    BEHAVIOR & AGGRESSION TOOL COLOR: Green, calm/cooperative   ABNL VS/O2: VSS on RA, BP soft.  MOBILITY: Lift, fall risk. Reposition q2h   PAIN MANAGMENT: no s/s of pain/discomfort  DIET: NPO; TF at goal rate 55 ml/hr with q4h FWF @ 60 ml  BOWEL/BLADDER: Incontinent of bowel and bladder. Male external catheter in place, good output  ABNL LAB/BG: Ca 7.8, phos 2.6, K 3.9  DRAIN/DEVICES: GJ tube, PIV SL  TELEMETRY RHYTHM: Sinus rhythm  SKIN: Prevalon boots to feet, coccyx, buttock are red and excoriated - see WOC nurse note;meplix to coccyx c/d/i  Scattered scabs and bruising. Right heel blister, Meplix c/d/i  TESTS/PROCEDURES: none  D/C DAY/GOALS/PLACE: Discharge pending progress  OTHER IMPORTANT INFO: Neuro's/CMS per pt baseline -RUE contracted, minimal movement with bilateral legs. LS-diminished,  oral cares done, palliative consulted, attempting to talk to patients mother.Continues on seizure  and aspiration precautions.

## 2023-12-20 NOTE — CONSULTS
"[facility] Nurse Inpatient Assessment     Consulted for: 12/19 Wound RIGHT medial heel. 12/17 Wound sacral.     Summary: patient with POA buttock wound with new consult for new hospital acquired pressure injury stage 2 intact blister right heel on consult assessment 12/20/20    Patient History (according to provider note(s):      \"\"63-year-old male with history of traumatic brain injury in the past causing right-sided spastic hemiplegia, patient nonverbal, GERD, history of DVT in the past, seizure disorder, hypothyroidism history of cardiac arrest in the past, panhypopituitarism, was recently admitted in the hospital from 11/29/2023 to 12/8/2023 for sepsis secondary to MRSA pneumonia now bring to the ED with complaint of low blood pressure shortness of breath found to be hypoxic at scene.\"    Assessment:      Areas visualized during today's visit: Focused: and Lower extremities  right     Pressure Injury Location: right medial heel    Last photo: 12/20  Wound type: Pressure Injury     Pressure Injury Stage: 2, hospital acquired      This is not a Medical Device Related Pressure Injury (MDRPI) due to  none   Wound history/plan of care:   mepilex in use with heel boot in place     Wound base:  blister, epidermis, and bulla     Palpation of the wound bed: boggy and fluctuance      Drainage: none     Description of drainage: none     Measurements (length x width x depth, in cm) 2.5  x 2.5  x  0 cm      Tunneling N/A     Undermining N/A  Periwound skin: Intact      Color: normal and consistent with surrounding tissue      Temperature: normal   Odor: none  Pain: no grimacing or signs of discomfort, none  Pain intervention prior to dressing change: patient tolerated well  Treatment goal: Heal , Maintain (prevention of deterioration), and Protection  STATUS: initial assessment  Supplies ordered: gathered, at bedside, and supplies stored on unit    My PI Risk Assessment     Sensory Perception: " "2 - Very Limited     Moisture: 2 - Very moist      Activity: 1 - Bedfast      Mobility: 2 - Very limited     Nutrition: 3 - Adequate     Friction/Shear: 1 - Problem     TOTAL: 11      Treatment Plan:       Wound care  DAILY        Comments: Location: right medial heel  Care: provided daily by primary RN  1. Cleanse daily with normal saline and 4x4\" guaze, pat dry  2. Apply skin prep sure prep or 3M no sting skin barrier film to blister and surrounding skin, let dry  3. Cover with 4x4\" mepilex dressing. Ok to lift and reapply after routine assessments. Ok to use each mepilex up to 5 days if not saturated with drainage touching three corners.  4. Apply prevalon heel boots per manufacture instructions while in bed and send home with patient on discharge          Orders: Written    RECOMMEND PRIMARY TEAM ORDER: None, at this time  Education provided: importance of repositioning, plan of care, Moisture management, Hygiene, and Off-loading pressure  Discussed plan of care with: Patient, Family, and Nurse  WOC nurse follow-up plan: twice weekly for HAPI, weekly for POA injury to buttock   Notify WOC if wound(s) deteriorate.  Nursing to notify the Provider(s) and re-consult the WOC Nurse if new skin concern.    DATA:     Current support surface: Standard  Low air loss (ZULY pump, Isolibrium, Pulsate, skin guard, etc)  Containment of urine/stool: Incontinence Protocol, Incontinent pad in bed, and Primofit external catheter  BMI: Body mass index is 23.28 kg/m .   Active diet order: Orders Placed This Encounter      NPO for Medical/Clinical Reasons Except for: Meds     Output: I/O last 3 completed shifts:  In: 1246 [NG/GT:335]  Out: 1200 [Urine:1200]     Labs:   Recent Labs   Lab 12/19/23  1151 12/18/23  0747   ALBUMIN  --  3.0*   HGB 11.1* 11.7*   WBC 8.1 11.4*     Pressure injury risk assessment:   Sensory Perception: 2-->very limited  Moisture: 2-->very moist  Activity: 1-->bedfast  Mobility: 1-->completely " immobile  Nutrition: 3-->adequate  Friction and Shear: 2-->potential problem  Ricci Score: 11    Shellie CWOCN   1st choice: Securely message with [employer] ([facility] [employer])   (2nd option: [facility] Office Phone [phone], messages checked periodically Mon-Fri 8a-4p)

## 2023-12-20 NOTE — PROGRESS NOTES
"[facility]    Hospitalist Progress Note      Assessment & Plan   Keyon Farias is a 63 year old male was admitted on 12/17/2023 with history of traumatic brain injury in the past causing right-sided spastic hemiplegia, patient nonverbal, GERD, history of DVT in the past, seizure disorder, hypothyroidism history of cardiac arrest in the past, panhypopituitarism, was recently admitted in the hospital from 11/29/2023 to 12/8/2023 for sepsis secondary to MRSA pneumonia now bring to the ED with complaint of low blood pressure shortness of breath found to be hypoxic at scene     Acute hypoxic respiratory failure  Possible aspiration pneumonia/pneumonitis  Severe sepsis secondary to possible aspiration pneumonia  63-year-old male with history of traumatic brain injury, spastic hemiplegia on tube feeding, presented with low blood pressure, hypoxia and shortness of breath.  Most likely he is aspirated after his mother given him bolus of 1 L of water with salt in it causing acute hypoxia, and possibly aspirating even before that.  Chest x-ray reviewed does not show any acute infiltrate or congestive changes.  No pneumothorax.  He was given 30 mill per KG of IV normal saline bolus total of 2 L, with resolution of his hypotension.  continue  IV normal saline with potassium at 100 mill per hour.  Will repeat his lactic acid every 4 hours unfortunately not repeated; now ordered.   Given recent history of MRSA pneumonia, history of Zosyn resistant Pseudomonas on admission started  on IV vancomycin, levofloxacin and Flagyl to cover for aspiration pneumonia.  Blood cultures NGTD.   procalcitonin 0.13   Keep him on supplemental oxygen to keep saturation 90% or above. NPO x meds.  ID consult; see note 12/18/2023, \"no clear signs of infection\" Sarah Shipman, IV metronidazole and observe off antibiotics.  Follow cultures, fever and WBC.  Preponderance of information suggest recurrent aspiration pneumonitis, " given water with salt per mother per admitting hospitalist; currently Mother denies, however yesterday she admits to given Patient po's.    -LA cleared with IVF.   -ST 12/18/2023 confirms patient is strict NPO.    -Due to presentation with recurrent aspiration and mother stating that she is performing exercises in hopes that her son will able to bear weight, transfer and ambulate will initiate Palliative Care consult to patient's current condition and review realistic goals of care.  In talking with nursing since last admission patient has had increased pressure wounds and to notify the mother that with her best cares and intention patient is progressing with pressure injuries that could lead to further infections.  Palliative Care discussions to perform today with mother, consult pending.     Panhypopituitarism  Hypothyroidism  Patient history of panhypopituitarism he is on testosterone supplements, hydrocortisone, and levothyroxine.  Given his low blood pressure with sepsis we will give him a stress dose of hydrocortisone 100 mg x 3 doses and then resume his oral hydrocortisone 15 mg in the morning and 7.5 mg in the evening  Continue levothyroxine  He is on Depo testosterone every week hold it while in the hospital.        Acute renal failure  Hyponatremia  Hypokalemia  Metabolic alkalosis  Acute renal failure is likely prerenal secondary to low blood pressure and dehydration.  He was resuscitated with IV fluids agree with that we will continue IV normal saline at this time as mentioned above, repeat the labs in the morning.  Sodium low most likely because of dehydration and increased water intake, by history of low sodium in the past as well.  Continue to monitor and repeat labs in the morning.  For his hypokalemia we will replace potassium and keep him on potassium placement protocol.  Patient is on sodium bicarb replacement at home, most likely the reason for his metabolic alkalosis, will hold sodium bicarb  tablets for now, most likely will need some but may be at a lower dose in the future     Epilepsy/seizure disorder  Traumatic brain injury  Right-sided spastic hemiplegia  Nonverbal  Pressure wounds coccyx/buttock and heel  Patient history of TBI in the past causing multiple medical problem, right-sided hemiplegic, nonverbal and have seizure disorder.  He is on Tegretol we will continue with that no recent history of seizures.  Will have PT and OT evaluate the patient as well. PTA uses [facility] [employer] mother feels that his legs can be strengthen to assist with transfers, will have PT OT assess to see if this is a realistic goal or again to provide data to mother that he will always require assistance/[facility] for transfers.  Wound care per WOC, regular repositioning/offloading.  Nutrition as able per TF.     Chronic dysphagia  Status post GJ tube placement  Keep him n.p.o., restart TF.     History of GERD  IV Protonix daily for now then resume p.o.    DVT Prophylaxis: Enoxaparin (Lovenox) SQ  Code Status: Full Code     Expected Discharge Date: 1-2 days   Destination: home with help/services         Cass Hoang MD  Text Page (7am - 6pm, M-F)    I spent 35 minutes total time in management of care today 12/20/2023 reviewing labs, medications, interdisciplinary notes; and completing documentation of encounter and orders with Care Management including Coordinating Care and plan with Specialists, Pall Care re goals of care.     Interval History   Limited history from patient, generally nonverbal.  Discussed with Palliative Care discussions with patient and mother regarding goals of care, see above.      SH: No tobacco.  PTA lives with mother, primary caregiver, [employer] at home.    ROS: Complete ROS negative except as above.     -Data reviewed today: I reviewed all new labs and imaging results over the last 24 hours.    Physical Exam   Temp: 97.5  F (36.4  C) Temp src: Axillary BP: 110/50 Pulse: 66   Resp: 18 SpO2: 99 %  O2 Device: None (Room air)    Vitals:    12/17/23 1228 12/18/23 0440 12/20/23 0646   Weight: 70.9 kg (156 lb 4.9 oz) 72.4 kg (159 lb 9.8 oz) 73.6 kg (162 lb 4.1 oz)       General/Constitutional:   NAD, awake, nonverbal, cachectic   Chest/Respiratory: Respirations nonlabored room air  Cardiovascular: no murmur appreciated.  LE edema none  Gastrointestinal/Abdomen:  soft, nontender, no rebound, guarding or other peritoneal signs.  PEG tube in place  MSK; LE deformity; reported pain on touch to R Leg, [Mother states chronic, hypersensitive]   Skin; pressure wound coccyx and heel  Neuro.  Gross motor tested, spastic hemiplegia  Psych affect calm    Medications    dextrose        Brivaracetam  100 mg Oral or Feeding Tube BID    carBAMazepine  100 mg Per Feeding Tube Daily    carBAMazepine  150 mg Oral or Feeding Tube TID    enoxaparin ANTICOAGULANT  40 mg Subcutaneous Q24H    hydrocortisone  15 mg Oral or Feeding Tube QAM    hydrocortisone  15 mg Oral or Feeding Tube QPM    levothyroxine  125 mcg Oral or Feeding Tube Daily    metoclopramide  10 mg Oral or Feeding Tube 4x Daily AC & HS    multivitamin, therapeutic  1 tablet Per Feeding Tube Daily    pantoprazole  40 mg Per Feeding Tube QAM AC    sodium chloride (PF)  3 mL Intracatheter Q8H    vitamin C  1,000 mg Oral or Feeding Tube Daily    vitamin D3  50 mcg Per Feeding Tube Daily       Data   Recent Labs   Lab 12/20/23  1057 12/20/23  0916 12/20/23  0816 12/20/23  0744 12/19/23  1151 12/19/23  0159 12/18/23  0747 12/17/23  1242   WBC  --   --   --   --  8.1  --  11.4* 13.5*   HGB  --   --   --   --  11.1*  --  11.7* 13.7   MCV  --   --   --   --  92  --  89 90   PLT  --  167  --   --  157  --  142* 223   NA  --   --   --  139 143  --  133* 131*   POTASSIUM  --   --   --  3.9 3.7  --  3.6 2.8*   CHLORIDE  --   --   --  107 108*  --  98 85*   CO2  --   --   --  25 29  --  27 37*   BUN  --   --   --  9.0 10.1  --  13.6 24.9*   CR  --   --   --  0.66* 0.71  0.71  --  0.74  1.20*   ANIONGAP  --   --   --  7 6*  --  8 9   STEVE  --   --   --  7.8* 7.8*  --  7.6* 8.7*   *  --  79 88 144*   < > 153* 144*   ALBUMIN  --   --   --   --   --   --  3.0* 3.6   PROTTOTAL  --   --   --   --   --   --  5.9* 7.3   BILITOTAL  --   --   --   --   --   --  0.4 0.7   ALKPHOS  --   --   --   --   --   --  72 101   ALT  --   --   --   --   --   --  22 24   AST  --   --   --   --   --   --  75* 68*    < > = values in this interval not displayed.

## 2023-12-20 NOTE — PLAN OF CARE
Goal Outcome Evaluation: [id number] Pt appears comfortable, smiles and makes eye contact. VSS, afebrile. Tele: Sinus tach. LS dim, fine crackles. GJ tube patent running 55ml/hr w/ FWF. Breakdown to gluteal folds, barrier cream and mepilex applied. Large intact blister noted to RIGHT medial heel. Mepilex applied-WOC consulted. Mouth is dry; RN performs oral debridement cares. UE's contracted. Rooke boots in place. Maintain PIP measures. External male cath. Large soft BM incontinence. Maintain PIP measures. HOB >30 for TF.          Plan of Care Reviewed With: patient, parent

## 2023-12-20 NOTE — PLAN OF CARE
Goal Outcome Evaluation:       DATE & TIME: 12/18/23 8147-0476   Cognitive Concerns/ Orientation : Pt alert, nonverbal, unable to assess orientation i   BEHAVIOR & AGGRESSION TOOL COLOR: Green, calm/cooperative   ABNL VS/O2: VSS on RA  MOBILITY: Lift, fall risk. Reposition q2h   PAIN MANAGMENT: no s/s of pain/discomfort  DIET: NPO; TF at goal rate 55 ml/hr with q4h FWF 60 ml  BOWEL/BLADDER: Incontinent of bowel and bladder. Male external catheter in place, good output, one large lose BM  this shift.  ABNL LAB/BG: Na 133, WBC 11.4, phos 1.8- replaced recheck in AM  DRAIN/DEVICES: D5/NS/20K 100cc/hr, GJ tube   TELEMETRY RHYTHM: Sinus rhythm  SKIN: Prevalon boots to feet, coccyx, buttock are red and excoriated - see WOC nurse note;  Scattered scabs and bruising.   TESTS/PROCEDURES:   D/C DAY/GOALS/PLACE: Discharge pending progress  OTHER IMPORTANT INFO: Neuro's/CMS per pt baseline -RUE contracted, minimal movement with bilateral legs. LS-diminished, no SOB/cough noted, oral cares done, coccyx Mepilex changed-D/I.

## 2023-12-20 NOTE — PLAN OF CARE
Goal Outcome Evaluation:  Summary:    DATE & TIME: 12/19-12/20/23 7629-9373  Cognitive Concerns/ Orientation : Pt alert, nonverbal, unable to assess orientation    BEHAVIOR & AGGRESSION TOOL COLOR: Green, calm/cooperative   ABNL VS/O2: VSS on RA  MOBILITY: Lift, fall risk. Reposition q2h   PAIN MANAGMENT: no s/s of pain/discomfort  DIET: NPO; TF at goal rate 55 ml/hr with q4h FWF @ 60 ml  BOWEL/BLADDER: Incontinent of bowel and bladder. Male external catheter in place, good output  ABNL LAB/BG: Ca 7.8, phos recheck in AM  DRAIN/DEVICES: GJ tube, PIV SL  TELEMETRY RHYTHM: Sinus rhythm  SKIN: Prevalon boots to feet, coccyx, buttock are red and excoriated - see WOC nurse note;  Scattered scabs and bruising.   TESTS/PROCEDURES:   D/C DAY/GOALS/PLACE: Discharge pending progress  OTHER IMPORTANT INFO: Neuro's/CMS per pt baseline -RUE contracted, minimal movement with bilateral legs. LS-diminished, no SOB/cough noted, oral cares done, coccyx Mepilex CDI

## 2023-12-21 VITALS
DIASTOLIC BLOOD PRESSURE: 86 MMHG | TEMPERATURE: 97.7 F | HEIGHT: 70 IN | RESPIRATION RATE: 18 BRPM | SYSTOLIC BLOOD PRESSURE: 123 MMHG | WEIGHT: 166.01 LBS | BODY MASS INDEX: 23.77 KG/M2 | HEART RATE: 73 BPM | OXYGEN SATURATION: 100 %

## 2023-12-21 LAB
GLUCOSE BLDC GLUCOMTR-MCNC: 93 MG/DL (ref 70–99)
PHOSPHATE SERPL-MCNC: 2.3 MG/DL (ref 2.5–4.5)
POTASSIUM SERPL-SCNC: 4.1 MMOL/L (ref 3.4–5.3)

## 2023-12-21 PROCEDURE — 84100 ASSAY OF PHOSPHORUS: CPT | Performed by: HOSPITALIST

## 2023-12-21 PROCEDURE — 250N000013 HC RX MED GY IP 250 OP 250 PS 637: Performed by: INTERNAL MEDICINE

## 2023-12-21 PROCEDURE — 99239 HOSP IP/OBS DSCHRG MGMT >30: CPT | Performed by: HOSPITALIST

## 2023-12-21 PROCEDURE — 36415 COLL VENOUS BLD VENIPUNCTURE: CPT | Performed by: HOSPITALIST

## 2023-12-21 PROCEDURE — 258N000003 HC RX IP 258 OP 636: Performed by: HOSPITALIST

## 2023-12-21 PROCEDURE — 84132 ASSAY OF SERUM POTASSIUM: CPT | Performed by: HOSPITALIST

## 2023-12-21 PROCEDURE — 250N000009 HC RX 250: Performed by: HOSPITALIST

## 2023-12-21 RX ADMIN — Medication 50 MCG: at 08:51

## 2023-12-21 RX ADMIN — HYDROCORTISONE 15 MG: 10 TABLET ORAL at 08:51

## 2023-12-21 RX ADMIN — HYDROCORTISONE 15 MG: 10 TABLET ORAL at 17:57

## 2023-12-21 RX ADMIN — METOCLOPRAMIDE HYDROCHLORIDE 10 MG: 5 SOLUTION ORAL at 16:05

## 2023-12-21 RX ADMIN — CARBAMAZEPINE 150 MG: 100 SUSPENSION ORAL at 06:45

## 2023-12-21 RX ADMIN — Medication 60 ML: at 16:05

## 2023-12-21 RX ADMIN — SODIUM PHOSPHATE, MONOBASIC, MONOHYDRATE AND SODIUM PHOSPHATE, DIBASIC, ANHYDROUS 9 MMOL: 142; 276 INJECTION, SOLUTION INTRAVENOUS at 13:06

## 2023-12-21 RX ADMIN — BRIVARACETAM 100 MG: 10 SOLUTION ORAL at 08:55

## 2023-12-21 RX ADMIN — CARBAMAZEPINE 150 MG: 100 SUSPENSION ORAL at 01:19

## 2023-12-21 RX ADMIN — METOCLOPRAMIDE HYDROCHLORIDE 10 MG: 5 SOLUTION ORAL at 06:45

## 2023-12-21 RX ADMIN — OXYCODONE HYDROCHLORIDE AND ACETAMINOPHEN 1000 MG: 500 TABLET ORAL at 08:51

## 2023-12-21 RX ADMIN — MULTIVITAMIN TABLET 1 TABLET: TABLET at 08:51

## 2023-12-21 RX ADMIN — CARBAMAZEPINE 100 MG: 100 SUSPENSION ORAL at 17:58

## 2023-12-21 RX ADMIN — Medication 40 MG: at 06:45

## 2023-12-21 RX ADMIN — METOCLOPRAMIDE HYDROCHLORIDE 10 MG: 5 SOLUTION ORAL at 11:34

## 2023-12-21 RX ADMIN — LEVOTHYROXINE SODIUM 125 MCG: 75 TABLET ORAL at 06:45

## 2023-12-21 RX ADMIN — CARBAMAZEPINE 150 MG: 100 SUSPENSION ORAL at 11:34

## 2023-12-21 ASSESSMENT — ACTIVITIES OF DAILY LIVING (ADL)
ADLS_ACUITY_SCORE: 61
ADLS_ACUITY_SCORE: 57
ADLS_ACUITY_SCORE: 61
ADLS_ACUITY_SCORE: 57
ADLS_ACUITY_SCORE: 57

## 2023-12-21 NOTE — PLAN OF CARE
Goal Outcome Evaluation:DATE & TIME: 12/21/23,7922-0201  Cognitive Concerns/ Orientation : Pt alert, nonverbal, unable to assess orientation. Smiles.   BEHAVIOR & AGGRESSION TOOL COLOR: Green, calm/cooperative   ABNL VS/O2: VSS on RA, BP soft.  MOBILITY: Lift, fall risk. Reposition q2h   PAIN MANAGMENT: no s/s of pain/discomfort..   DIET: NPO; TF at goal rate 55 ml/hr with q4h FWF @ 60 ml.   BOWEL/BLADDER: Incontinent of bowel and bladder. Male external catheter in place, good output. No BM   ABNL LAB/BG: Ca 7.8, phos 2.3( replacement in progress recheck in AM), K 4,1, mag 1.9  DRAIN/DEVICES: GJ tube, L. PIV/SL.   TELEMETRY RHYTHM: Sinus rhythm  SKIN: Prevalon boots to feet, coccyx, buttock are red and excoriated -right heel blister coved with meplix.   TESTS/PROCEDURES: none  D/C DAY/GOALS/PLACE:plan to discharge home  today,  OTHER IMPORTANT INFO: Neuro's/CMS per pt baseline,RUE contracted, minimal movement with bilateral legs. LS-diminished,  oral cares done, coccyx Mepilex CDI. Mother at bedside and updated on plan of care.  Seen by Palliative.

## 2023-12-21 NOTE — PLAN OF CARE
Goal Outcome Evaluation:       Summary: admitted to the ED with complaint of low blood pressure shortness of breath found to have hypoxic respiratory failure and possible aspiration pneumonitis, secondary to severe sepsis  DATE & TIME: 12/20/23,3426-4683  Cognitive Concerns/ Orientation : Pt alert, nonverbal, unable to assess orientation. Smiles.   BEHAVIOR & AGGRESSION TOOL COLOR: Green, calm/cooperative   ABNL VS/O2: VSS on RA, BP soft.  MOBILITY: Lift, fall risk. Reposition q2h   PAIN MANAGMENT: no s/s of pain/discomfort, occasional grimace with repositioning, PRN tylenol given x 1.   DIET: NPO; TF at goal rate 55 ml/hr with q4h FWF @ 60 ml- pre-programed, tubing and bottled changed out this evening.   BOWEL/BLADDER: Incontinent of bowel and bladder. Male external catheter in place, good output. No BM   ABNL LAB/BG: Ca 7.8, phos 2.6, K 3.9, mag 1.9  DRAIN/DEVICES: GJ tube, L. PIV/SL.   TELEMETRY RHYTHM: Sinus rhythm  SKIN: Prevalon boots to feet, coccyx, buttock are red and excoriated - see WOC nurse note; Scattered scabs and bruising.   TESTS/PROCEDURES: none  D/C DAY/GOALS/PLACE: Discharge pending progress  OTHER IMPORTANT INFO: Neuro's/CMS per pt baseline, does not follow all commands due to orientation -RUE contracted, minimal movement with bilateral legs. LS-diminished,  oral cares done, coccyx Mepilex CDI. Mother at bedside and updated on plan of care.

## 2023-12-21 NOTE — PLAN OF CARE
Goal Outcome Evaluation:      Plan of Care Reviewed With: patient    Summary: admitted to the ED with complaint of low blood pressure shortness of breath found to have hypoxic respiratory failure and possible aspiration pneumonitis, secondary to severe sepsis  DATE & TIME: 12/20/23,4137-3431  Cognitive Concerns/ Orientation : Pt alert, nonverbal, unable to assess orientation. Smiles.   BEHAVIOR & AGGRESSION TOOL COLOR: Green, calm/cooperative   ABNL VS/O2: VSS on RA, BP soft.  MOBILITY: Lift, fall risk. Reposition q2h   PAIN MANAGMENT: no s/s of pain/discomfort, occasional grimace with repositioning, PRN tylenol given x 1.   DIET: NPO; TF at goal rate 55 ml/hr with q4h FWF @ 60 ml- pre-programed, tubing and bottled changed out this evening.   BOWEL/BLADDER: Incontinent of bowel and bladder. Male external catheter in place, good output. No BM   ABNL LAB/BG: Ca 7.8, phos 2.6, K 3.9, mag 1.9 (am checks in the morning)   DRAIN/DEVICES: GJ tube, L. PIV/SL.   TELEMETRY RHYTHM: Sinus rhythm  SKIN: Prevalon boots to feet, coccyx, buttock are red and excoriated - see WOC nurse note; blister to R. Medial heel. Scattered scabs and bruising.   TESTS/PROCEDURES: none  D/C DAY/GOALS/PLACE: Discharge pending progress  OTHER IMPORTANT INFO: Neuro's/CMS per pt baseline, does not follow all commands due to orientation -RUE contracted, minimal movement with bilateral legs. LS-diminished,  oral cares done, coccyx Mepilex CDI. Mother at bedside and updated on plan of care.

## 2023-12-21 NOTE — DISCHARGE SUMMARY
[facility]    Discharge Summary  Hospitalist    Date of Admission:  12/17/2023  Date of Discharge:  12/21/2023  Discharging Provider: Cass Hoang MD  Date of Service (when I saw the patient): 12/21/23      History of Present Illness   Keyon Farias is a 63 year old male was admitted on 12/17/2023 with history of traumatic brain injury in the past causing right-sided spastic hemiplegia, patient nonverbal, GERD, history of DVT in the past, seizure disorder, hypothyroidism history of cardiac arrest in the past, panhypopituitarism, was recently admitted in the hospital from 11/29/2023 to 12/8/2023 for sepsis secondary to MRSA pneumonia now bring to the ED with complaint of low blood pressure shortness of breath found to be hypoxic at scene     Hospital Course   Keyon Farias was admitted on 12/17/2023.  The following problems were addressed during his hospitalization:    Acute hypoxic respiratory failure; resolved.   Likely aspiration pneumonitis  Severe sepsis secondary to possible aspiration; resolved   63-year-old male with history of traumatic brain injury, spastic hemiplegia on tube feeding, presented with low blood pressure, hypoxia and shortness of breath.  Most likely he is aspirated after his mother given him bolus of 1 L of water with salt in it causing acute hypoxia, and possibly aspirating even before that.  Chest x-ray reviewed does not show any acute infiltrate or congestive changes.  No pneumothorax.  He was given 30 mill per KG of IV normal saline bolus total of 2 L, with resolution of his hypotension.  continue  IV normal saline with potassium at 100 mill per hour.  Will repeat his lactic acid every 4 hours unfortunately not repeated; now ordered resolved with IVF; taking in adequate p.o.'s, DC IVF  Given recent history of MRSA pneumonia, history of Zosyn resistant Pseudomonas on admission started  on IV vancomycin, levofloxacin and Flagyl to cover for aspiration  "pneumonia.  Blood cultures NGTD.   procalcitonin 0.13   Keep him on supplemental oxygen to keep saturation 90% or above.  On discharge transition to room air.  ID consult; see note 12/18/2023, \"no clear signs of infection\" Sarah Shipman, IV metronidazole and observe off antibiotics.  Follow-up cultures NGTD, afebrile, leukocytosis resolved.  Preponderance of information suggest recurrent aspiration pneumonitis without pneumonia, given water with salt per mother per admitting hospitalist; currently Mother denies, however yesterday she admits to given Patient po's.    -LA cleared with IVF.   -ST 12/18/2023 confirms patient is strict NPO.    -Due to presentation with recurrent aspiration and mother stating that she is performing exercises in hopes that her son will able to bear weight, transfer and ambulate will initiate Palliative Care consult to patient's current condition and review realistic goals of care especially with mother.  In talking with nursing since last admission patient has had increased pressure wounds and to notify the mother that with her best cares and intention patient is progressing with pressure injuries that could lead to further infections.  Mother agreed to palliative Care consult however on the day of consult she was available to palliative care NP.  On day of discharge mother was adamant that she wanted to take the patient home with herself being primary caregiver, reinforced need for strict n.p.o., and even though she still feels she wants to do leg exercise and hopes to have patient bear weight and ambulate/transfer per PT this is unlikely as he is a two-person/Catarina assist.  Reminded mother of frequent repositioning, offloading and proper nutrition related to pressure wounds.  Will discharge patient with home health nursing for surveillance.      Panhypopituitarism  Hypothyroidism  Patient history of panhypopituitarism he is on testosterone supplements, hydrocortisone, and " levothyroxine.  Given his low blood pressure with sepsis we will give him a stress dose of hydrocortisone 100 mg x 3 doses and then resume his oral hydrocortisone 15 mg in the morning and 7.5 mg in the evening  Continue levothyroxine  He is on Depo testosterone every week hold it while in the hospital.  Resume on discharge        Acute renal failure  Hyponatremia,  Hypokalemia, replete  Metabolic alkalosis, now normal  Acute renal failure is likely prerenal secondary to low blood pressure and dehydration.  He was resuscitated with IV fluids, continue IV normal saline with resolution of hyponatremia.  Adequate p.o. intake, DC IVF.  For his hypokalemia, repleted with potassium replacement protocol.   Patient is on sodium bicarb replacement at home, most likely the reason for his metabolic alkalosis, will hold sodium bicarb tablets and during his hospital stay at 4 days bicarb now within normal range and stable, therefore follow-up BMP with PCP on hospital discharge to see if he needs maintenance sodium bicarb replacement.       Epilepsy/seizure disorder  Traumatic brain injury  Right-sided spastic hemiplegia  Nonverbal  Pressure wounds coccyx/buttock and heel  Patient history of TBI in the past causing multiple medical problem, right-sided hemiplegic, nonverbal and have seizure disorder.  He is on Tegretol we will continue with that no recent history of seizures.  . PTA uses [employer] [facility] mother feels that his legs can be strengthen to assist with transfers, therapies assessment states chronic 2 person/Catarina lift continued, not candidate for skilled therapy.  .  Wound care per WOC, regular repositioning/offloading.  Nutrition as able per TF.  During hospital stay was on daily 'Expedite wound support module', and per PharmD not carried by [facility] pharmacy, therefore follow-up wound assessment per PCP and home health RN and assure adequate nutrition.     Chronic dysphagia  Status post GJ tube  placement  Keep him n.p.o., restart TF.  Home health RN to assess adequate nutrition and assess wounds     History of GERD  IV Protonix daily for now then resume p.o.     Pending Results   These results will be followed up by Hospitalist Service   Unresulted Labs Ordered in the Past 30 Days of this Admission       Date and Time Order Name Status Description    12/17/2023 12:27 PM Blood Culture Peripheral Blood Preliminary     12/17/2023 12:27 PM Blood Culture Peripheral Blood Preliminary             Code Status   Full Code       Primary Care Physician   Elsa Queen    Physical Exam   Temp: 97.7  F (36.5  C) Temp src: Oral BP: 110/51 Pulse: 80   Resp: 18 SpO2: 100 % O2 Device: None (Room air)    Vitals:    12/18/23 0440 12/20/23 0646 12/21/23 0456   Weight: 72.4 kg (159 lb 9.8 oz) 73.6 kg (162 lb 4.1 oz) 75.3 kg (166 lb 0.1 oz)     General/Constitutional:   NAD, awake, nonverbal, cachectic.  Chest/Respiratory: Respirations nonlabored room air  Cardiovascular: no murmur appreciated.  LE edema none  Gastrointestinal/Abdomen:  soft, nontender, no rebound, guarding or other peritoneal signs.  PEG tube in place  MSK; LE deformity; reported pain on touch to R Leg, [Mother states chronic, hypersensitive]   Skin; pressure wound coccyx and heel  Neuro.  Gross motor tested, spastic hemiplegia  Psych affect calm    Discharge Disposition   Discharged to home; Mother 1' caregiver; Home Health RN arranged.   Condition at discharge: Fair    Consultations This Hospital Stay   PHARMACY TO DOSE VANCO  PHYSICAL THERAPY ADULT IP CONSULT  OCCUPATIONAL THERAPY ADULT IP CONSULT  CARE MANAGEMENT / SOCIAL WORK IP CONSULT  PHARMACY TO DOSE VANCO  WOUND OSTOMY CONTINENCE NURSE  IP CONSULT  NUTRITION SERVICES ADULT IP CONSULT  INFECTIOUS DISEASES IP CONSULT  PHARMACY IP CONSULT  SPEECH LANGUAGE PATH ADULT IP CONSULT  PALLIATIVE CARE ADULT IP CONSULT  WOUND OSTOMY CONTINENCE NURSE  IP CONSULT    Time Spent on this Encounter   Cass DYKES  MD Natalya, personally saw the patient today and spent greater than 30 minutes discharging this patient discussing discharge plans with Patient, Nursing and care Coordinator, coordinating with Specialties ID regarding discharge recommendations, arrange home health RN for home safety and wound surveillance; complete discharge orders, medications and follow-up    Discharge Orders      Medication Therapy Management Referral      Home Care Referral      Reason for your hospital stay    Presented with low blood pressure and shortness of breath; resolved.     Follow-up and recommended labs and tests     Follow up with primary care provider, Elsa Queen, within 7 days for hospital follow- up including pressure wounds.  The following labs/tests are recommended: CBC and BMP .      [facility] will call to schedule in your home.     Activity    Your activity upon discharge: activity as tolerated with assistance of Catarina LIft for transfers, repositioning.     Diet    Follow this diet upon discharge:       Adult Formula Drip Feeding: Continuous Jevity 1.5; Jejunostomy; Goal Rate: 55; mL/hr; From: 7:00 AM; To: 5:00 AM; After phos replaced, Begin TF @ 35 mL/hr. After 4 hours w/ good tolerance, advance to goal. hold for 1 hour before and 1 hour after l...          Nothing By Mouth due to high risk for aspiration.     Discharge Medications   Current Discharge Medication List        CONTINUE these medications which have NOT CHANGED    Details   acetaminophen (TYLENOL) 325 MG tablet Take 650 mg by mouth every 6 hours as needed for mild pain      albuterol (PROVENTIL) (5 MG/ML) 0.5% neb solution Take 0.5 mLs (2.5 mg) by nebulization every 6 hours as needed for shortness of breath, wheezing or cough 0700 1100 1500 1900 with mucomyst  Qty: 240 mL, Refills: 0    Associated Diagnoses: Aspiration pneumonitis (H)      bacitracin 500 UNIT/GM external ointment Apply topically daily as needed for wound care To PEG site.  Qty: 30 g,  Refills: 3    Associated Diagnoses: G tube feedings (H)      Brivaracetam (BRIVIACT) 10 MG/ML solution 100 mg by Oral or Feeding Tube route 2 times daily 0900, 2100      !! 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 carBAMazepine (TEGRETOL) 100 MG/5ML suspension 100 mg by Per Feeding Tube route daily Take at 1800      COMPOUNDED NON-CONTROLLED SUBSTANCE (CMPD RX) - PHARMACY TO MIX COMPOUNDED MEDICATION Scopolamine 0.4mg capsules - take  2 capsule by feeding tube three times daily as needed  Qty: 90 capsule, Refills: 1    Associated Diagnoses: Excessive oral secretions      Cyanocobalamin (VITAMIN B-12 PO) 2,500 mcg by Per Feeding Tube route daily      glycopyrrolate (ROBINUL) 1 MG tablet Take 1 tablet (1 mg) by mouth 2 times daily as needed (secretions)  Qty: 180 tablet, Refills: 1    Associated Diagnoses: Excessive oral secretions      guaiFENesin (MUCINEX) 600 MG 12 hr tablet Take 1 tablet (600 mg) by mouth 2 times daily as needed for congestion  Qty: 60 tablet, Refills: 0    Associated Diagnoses: Aspiration pneumonitis (H)      hydrocortisone (CORTAID) 1 % external cream Apply topically 2 times daily as needed Apply to reddened memo areas as needed      hydrocortisone (CORTEF) 5 MG tablet Take 15 mg (3 tablets) in the morning and 7.5 mg (1.5 tablet)  at 2:00 PM. During illness patient takes more as a stress dose. Please increase the dose as directed.  Qty: 400 tablet, Refills: 3    Associated Diagnoses: Secondary adrenal insufficiency (H24)      levothyroxine (SYNTHROID/LEVOTHROID) 125 MCG tablet Take 1 tablet (125 mcg) by mouth daily  Qty: 90 tablet, Refills: 0    Associated Diagnoses: Hypothyroidism, unspecified type      metoclopramide (REGLAN) 10 MG/10ML SOLN solution Take 10  "mLs (10 mg) by mouth 4 times daily (before meals and nightly) 0800, 1200, 1600, 2000 Disconnects bag before administration, then waits 45 mins before reconnecting after giving the medication  Qty: 2365 mL, Refills: 5    Associated Diagnoses: Aspiration pneumonitis (H)      miconazole (MICATIN) 2 % external powder Apply topically 2 times daily as needed    Associated Diagnoses: Recurrent pneumonia      multivitamin, therapeutic (THERA-VIT) TABS tablet 1 tablet by Per Feeding Tube route daily      mupirocin (BACTROBAN) 2 % external ointment APPLY TOPICALLY TO THE AFFECTED AREA TWICE DAILY AS NEEDED  Qty: 30 g, Refills: 1    Associated Diagnoses: Panhypopituitarism (H24); Hypogonadism male      pantoprazole (PROTONIX) 2 mg/mL SUSP suspension TAKE 20ML PER FEEDING TUBE DAILY  Qty: 600 mL, Refills: 3    Associated Diagnoses: Gastroesophageal reflux disease      testosterone cypionate (DEPOTESTOSTERONE) 200 MG/ML injection Inject 0.25 mLs (50 mg) into the muscle once a week  Qty: 4 mL, Refills: 1    Associated Diagnoses: Panhypopituitarism (H24); Hypogonadism male      vitamin C (ASCORBIC ACID) 1000 MG TABS 1,000 mg by Oral or Feeding Tube route daily       vitamin D3 (CHOLECALCIFEROL) 2000 units (50 mcg) tablet 2,000 Units by Per Feeding Tube route daily      insulin syringe-needle U-100 (29G X 1/2\" 1 ML) 29G X 1/2\" 1 ML miscellaneous Syringe needle use as needed  Qty: 200 each, Refills: 11    Associated Diagnoses: Panhypopituitarism (H24)       !! - Potential duplicate medications found. Please discuss with provider.        STOP taking these medications       sodium bicarbonate 650 MG tablet Comments:   Reason for Stopping:             Allergies   Allergies   Allergen Reactions    Valproic Acid Other (See Comments)     Toxicity w/ bone marrow suspension, elevated ammonia levels     Dilantin [Phenytoin Sodium] Other (See Comments)     Severe Trembling    Scopolamine Hives     Hives with the patch - oral no problem "     Data   Most Recent 3 CBC's:  Recent Labs   Lab Test 12/20/23  0916 12/19/23  1151 12/18/23  0747 12/17/23  1242   WBC  --  8.1 11.4* 13.5*   HGB  --  11.1* 11.7* 13.7   MCV  --  92 89 90    157 142* 223      Most Recent 3 BMP's:  Recent Labs   Lab Test 12/21/23  1031 12/21/23  0755 12/20/23  1057 12/20/23  0816 12/20/23  0744 12/19/23  1151 12/19/23  0159 12/18/23  0747   NA  --   --   --   --  139 143  --  133*   POTASSIUM 4.1  --   --   --  3.9 3.7  --  3.6   CHLORIDE  --   --   --   --  107 108*  --  98   CO2  --   --   --   --  25 29  --  27   BUN  --   --   --   --  9.0 10.1  --  13.6   CR  --   --   --   --  0.66* 0.71  0.71  --  0.74   ANIONGAP  --   --   --   --  7 6*  --  8   STEVE  --   --   --   --  7.8* 7.8*  --  7.6*   GLC  --  93 111* 79 88 144*   < > 153*    < > = values in this interval not displayed.     Most Recent 2 LFT's:  Recent Labs   Lab Test 12/18/23  0747 12/17/23  1242   AST 75* 68*   ALT 22 24   ALKPHOS 72 101   BILITOTAL 0.4 0.7

## 2023-12-21 NOTE — PROGRESS NOTES
"Palliative care short progress note:    Writer called Keyon's mother on the phone but she was unavailable. I left a message on her answering machine again today. If she should become available for a meeting please contact our team.    During regular M-F work hours ([phone]) securely message our team via [email or web address] \"[facility]\" or go to On Call tab in [employer], search for \"[facility]\"               "

## 2023-12-21 NOTE — PROGRESS NOTES
CLINICAL NUTRITION SERVICES BRIEF NOTE  See RD note dated 12/18 for full assessment.     - Chart reviewed for TF tolerance. Noted new PI as documented yesterday per WOCN:     12/20: Right medial heel  Pressure Injury Stage: 2, hospital acquired     Interventions:   Will add 1 bottle expedite wound healing supplement until healed.   Thera vit already ordered.  Continue TF + flushes as ordered.     RD will continue to follow.     Marisel Fernández RD, LD  Pager: [phone]  Weekend Pager: [phone]

## 2023-12-22 LAB
BACTERIA BLD CULT: NO GROWTH
BACTERIA BLD CULT: NO GROWTH

## 2023-12-22 NOTE — PLAN OF CARE
Goal Outcome Evaluation:      Plan of Care Reviewed With: patient    Discharge    Patient discharged to home via car with transport  Care plan note: A & O x 1 (self only). No nonverbal indicators of pain. L. PIV removed. GJ tube flushed and clamped for transport. Mom updated on discharge. AVS printed and sent with transport team.       Listed belongings gathered and given to patient (including from security/pharmacy). Yes  Care Plan and Patient education resolved: Yes  Prescriptions if needed, hard copies sent with patient  NA  Medication Bin checked and emptied on discharge Yes  SW/care coordinator/charge RN aware of discharge: Yes    Rosa Treadwell, RN on 12/21/2023 at 6:58 PM

## 2023-12-23 ENCOUNTER — PATIENT OUTREACH (OUTPATIENT)
Dept: CARE COORDINATION | Facility: CLINIC | Age: 63
End: 2023-12-23
Payer: MEDICARE

## 2023-12-23 NOTE — PROGRESS NOTES
Clinic Care Coordination Contact  [facility]/Voicemail       Clinical Data: Care Coordinator Outreach  Outreach attempted x 2.  Left message on patient's voicemail with call back information and requested return call.  Plan:  Care Coordinator will do no further outreaches at this time.    [facility] Worker  Clinic Care Coordination  [facility]  Phone: [phone]

## 2023-12-27 ENCOUNTER — TELEPHONE (OUTPATIENT)
Dept: ENDOCRINOLOGY | Facility: CLINIC | Age: 63
End: 2023-12-27
Payer: MEDICARE

## 2023-12-27 NOTE — TELEPHONE ENCOUNTER
Patient call:     Appointment type: Return Endocrine   Provider: Vinay   Return date: next available SHRAVAN  Speciality phone number: [phone]  Additional appointment(s) needed: N/A   Additional notes: VM full, no MyC, Letter sent x1   Missed his recent appointment. Please offer next available SHRAVAN for sooner appt than 7/24/24 bert Mclean. Per Vinay     Examples:  3/6 in person vinay [facility]  3/11 virtual vinay mg  3/25 virtual zefannyias mg  4/8 virtual zekarias mg    Please note that the above appointment(s) will require manual scheduling as they are marked as SHRAVAN and will not appear using auto search. Do not schedule the patient if another patient has already been scheduled in the requested appointment slot.        Jossie Lancaster on 12/27/2023 at 2:35 PM

## 2023-12-29 ENCOUNTER — APPOINTMENT (OUTPATIENT)
Dept: GENERAL RADIOLOGY | Facility: CLINIC | Age: 63
DRG: 871 | End: 2023-12-29
Attending: STUDENT IN AN ORGANIZED HEALTH CARE EDUCATION/TRAINING PROGRAM
Payer: MEDICARE

## 2023-12-29 ENCOUNTER — TELEPHONE (OUTPATIENT)
Dept: ENDOCRINOLOGY | Facility: CLINIC | Age: 63
End: 2023-12-29
Payer: MEDICARE

## 2023-12-29 ENCOUNTER — NURSE TRIAGE (OUTPATIENT)
Dept: NURSING | Facility: CLINIC | Age: 63
End: 2023-12-29
Payer: MEDICARE

## 2023-12-29 ENCOUNTER — HOSPITAL ENCOUNTER (INPATIENT)
Facility: CLINIC | Age: 63
LOS: 5 days | Discharge: HOME | DRG: 871 | End: 2024-01-04
Attending: STUDENT IN AN ORGANIZED HEALTH CARE EDUCATION/TRAINING PROGRAM | Admitting: INTERNAL MEDICINE
Payer: MEDICARE

## 2023-12-29 DIAGNOSIS — J18.9 MULTIFOCAL PNEUMONIA: ICD-10-CM

## 2023-12-29 DIAGNOSIS — N17.9 AKI (ACUTE KIDNEY INJURY) (H): ICD-10-CM

## 2023-12-29 DIAGNOSIS — A41.9 SEPSIS WITHOUT ACUTE ORGAN DYSFUNCTION, DUE TO UNSPECIFIED ORGANISM (H): Primary | ICD-10-CM

## 2023-12-29 DIAGNOSIS — A41.9 SEPTIC SHOCK (H): ICD-10-CM

## 2023-12-29 DIAGNOSIS — R65.21 SEPTIC SHOCK (H): ICD-10-CM

## 2023-12-29 LAB
ALBUMIN SERPL BCG-MCNC: 3.3 G/DL (ref 3.5–5.2)
ALBUMIN UR-MCNC: 30 MG/DL
ALP SERPL-CCNC: 87 U/L (ref 40–150)
ALT SERPL W P-5'-P-CCNC: 27 U/L (ref 0–70)
ANION GAP SERPL CALCULATED.3IONS-SCNC: 12 MMOL/L (ref 7–15)
APPEARANCE UR: CLEAR
AST SERPL W P-5'-P-CCNC: 46 U/L (ref 0–45)
BASOPHILS # BLD AUTO: 0.1 10E3/UL (ref 0–0.2)
BASOPHILS NFR BLD AUTO: 1 %
BILIRUB SERPL-MCNC: 0.3 MG/DL
BILIRUB UR QL STRIP: NEGATIVE
BUN SERPL-MCNC: 28.6 MG/DL (ref 8–23)
CALCIUM SERPL-MCNC: 8.5 MG/DL (ref 8.8–10.2)
CHLORIDE SERPL-SCNC: 95 MMOL/L (ref 98–107)
COLOR UR AUTO: YELLOW
CREAT SERPL-MCNC: 1.42 MG/DL (ref 0.67–1.17)
DEPRECATED HCO3 PLAS-SCNC: 29 MMOL/L (ref 22–29)
EGFRCR SERPLBLD CKD-EPI 2021: 56 ML/MIN/1.73M2
EOSINOPHIL # BLD AUTO: 0.3 10E3/UL (ref 0–0.7)
EOSINOPHIL NFR BLD AUTO: 2 %
ERYTHROCYTE [DISTWIDTH] IN BLOOD BY AUTOMATED COUNT: 13.9 % (ref 10–15)
GLUCOSE SERPL-MCNC: 271 MG/DL (ref 70–99)
GLUCOSE UR STRIP-MCNC: NEGATIVE MG/DL
HCO3 BLDV-SCNC: 30 MMOL/L (ref 21–28)
HCT VFR BLD AUTO: 40.8 % (ref 40–53)
HGB BLD-MCNC: 13.3 G/DL (ref 13.3–17.7)
HGB UR QL STRIP: NEGATIVE
HOLD SPECIMEN: NORMAL
HOLD SPECIMEN: NORMAL
HYALINE CASTS: 1 /LPF
IMM GRANULOCYTES # BLD: 0.1 10E3/UL
IMM GRANULOCYTES NFR BLD: 0 %
KETONES UR STRIP-MCNC: NEGATIVE MG/DL
LACTATE BLD-SCNC: 2.3 MMOL/L
LEUKOCYTE ESTERASE UR QL STRIP: ABNORMAL
LYMPHOCYTES # BLD AUTO: 2.2 10E3/UL (ref 0.8–5.3)
LYMPHOCYTES NFR BLD AUTO: 15 %
MCH RBC QN AUTO: 29.7 PG (ref 26.5–33)
MCHC RBC AUTO-ENTMCNC: 32.6 G/DL (ref 31.5–36.5)
MCV RBC AUTO: 91 FL (ref 78–100)
MONOCYTES # BLD AUTO: 1.2 10E3/UL (ref 0–1.3)
MONOCYTES NFR BLD AUTO: 9 %
MUCOUS THREADS #/AREA URNS LPF: PRESENT /LPF
NEUTROPHILS # BLD AUTO: 10.3 10E3/UL (ref 1.6–8.3)
NEUTROPHILS NFR BLD AUTO: 73 %
NITRATE UR QL: NEGATIVE
NRBC # BLD AUTO: 0 10E3/UL
NRBC BLD AUTO-RTO: 0 /100
PCO2 BLDV: 46 MM HG (ref 40–50)
PH BLDV: 7.42 [PH] (ref 7.32–7.43)
PH UR STRIP: 5.5 [PH] (ref 5–7)
PLATELET # BLD AUTO: 361 10E3/UL (ref 150–450)
PO2 BLDV: 27 MM HG (ref 25–47)
POTASSIUM SERPL-SCNC: 3.9 MMOL/L (ref 3.4–5.3)
PROT SERPL-MCNC: 7.4 G/DL (ref 6.4–8.3)
RBC # BLD AUTO: 4.48 10E6/UL (ref 4.4–5.9)
RBC URINE: 1 /HPF
SAO2 % BLDV: 52 % (ref 94–100)
SARS-COV-2 RNA RESP QL NAA+PROBE: POSITIVE
SODIUM SERPL-SCNC: 136 MMOL/L (ref 135–145)
SP GR UR STRIP: 1.03 (ref 1–1.03)
SQUAMOUS EPITHELIAL: <1 /HPF
UROBILINOGEN UR STRIP-MCNC: NORMAL MG/DL
WBC # BLD AUTO: 14.2 10E3/UL (ref 4–11)
WBC URINE: 17 /HPF

## 2023-12-29 PROCEDURE — 06HM33Z INSERTION OF INFUSION DEVICE INTO RIGHT FEMORAL VEIN, PERCUTANEOUS APPROACH: ICD-10-PCS | Performed by: STUDENT IN AN ORGANIZED HEALTH CARE EDUCATION/TRAINING PROGRAM

## 2023-12-29 PROCEDURE — 76937 US GUIDE VASCULAR ACCESS: CPT

## 2023-12-29 PROCEDURE — 96365 THER/PROPH/DIAG IV INF INIT: CPT | Mod: 59

## 2023-12-29 PROCEDURE — 93005 ELECTROCARDIOGRAM TRACING: CPT

## 2023-12-29 PROCEDURE — 36415 COLL VENOUS BLD VENIPUNCTURE: CPT | Performed by: STUDENT IN AN ORGANIZED HEALTH CARE EDUCATION/TRAINING PROGRAM

## 2023-12-29 PROCEDURE — 81001 URINALYSIS AUTO W/SCOPE: CPT | Performed by: STUDENT IN AN ORGANIZED HEALTH CARE EDUCATION/TRAINING PROGRAM

## 2023-12-29 PROCEDURE — 250N000013 HC RX MED GY IP 250 OP 250 PS 637: Performed by: STUDENT IN AN ORGANIZED HEALTH CARE EDUCATION/TRAINING PROGRAM

## 2023-12-29 PROCEDURE — 87086 URINE CULTURE/COLONY COUNT: CPT | Performed by: STUDENT IN AN ORGANIZED HEALTH CARE EDUCATION/TRAINING PROGRAM

## 2023-12-29 PROCEDURE — 99291 CRITICAL CARE FIRST HOUR: CPT | Mod: 25

## 2023-12-29 PROCEDURE — 96360 HYDRATION IV INFUSION INIT: CPT

## 2023-12-29 PROCEDURE — 96375 TX/PRO/DX INJ NEW DRUG ADDON: CPT

## 2023-12-29 PROCEDURE — 84145 PROCALCITONIN (PCT): CPT | Performed by: INTERNAL MEDICINE

## 2023-12-29 PROCEDURE — 82803 BLOOD GASES ANY COMBINATION: CPT

## 2023-12-29 PROCEDURE — 36558 INSERT TUNNELED CV CATH: CPT

## 2023-12-29 PROCEDURE — 3E043XZ INTRODUCTION OF VASOPRESSOR INTO CENTRAL VEIN, PERCUTANEOUS APPROACH: ICD-10-PCS | Performed by: STUDENT IN AN ORGANIZED HEALTH CARE EDUCATION/TRAINING PROGRAM

## 2023-12-29 PROCEDURE — 85025 COMPLETE CBC W/AUTO DIFF WBC: CPT | Performed by: STUDENT IN AN ORGANIZED HEALTH CARE EDUCATION/TRAINING PROGRAM

## 2023-12-29 PROCEDURE — 96366 THER/PROPH/DIAG IV INF ADDON: CPT

## 2023-12-29 PROCEDURE — 80053 COMPREHEN METABOLIC PANEL: CPT | Performed by: STUDENT IN AN ORGANIZED HEALTH CARE EDUCATION/TRAINING PROGRAM

## 2023-12-29 PROCEDURE — 71045 X-RAY EXAM CHEST 1 VIEW: CPT

## 2023-12-29 PROCEDURE — 258N000003 HC RX IP 258 OP 636: Performed by: STUDENT IN AN ORGANIZED HEALTH CARE EDUCATION/TRAINING PROGRAM

## 2023-12-29 PROCEDURE — 87040 BLOOD CULTURE FOR BACTERIA: CPT | Performed by: STUDENT IN AN ORGANIZED HEALTH CARE EDUCATION/TRAINING PROGRAM

## 2023-12-29 PROCEDURE — 250N000011 HC RX IP 250 OP 636: Mod: JZ | Performed by: STUDENT IN AN ORGANIZED HEALTH CARE EDUCATION/TRAINING PROGRAM

## 2023-12-29 PROCEDURE — 96361 HYDRATE IV INFUSION ADD-ON: CPT

## 2023-12-29 PROCEDURE — 87635 SARS-COV-2 COVID-19 AMP PRB: CPT | Performed by: STUDENT IN AN ORGANIZED HEALTH CARE EDUCATION/TRAINING PROGRAM

## 2023-12-29 RX ORDER — LEVOFLOXACIN 5 MG/ML
750 INJECTION, SOLUTION INTRAVENOUS ONCE
Status: COMPLETED | OUTPATIENT
Start: 2023-12-29 | End: 2023-12-30

## 2023-12-29 RX ORDER — ACETAMINOPHEN 650 MG/1
650 SUPPOSITORY RECTAL ONCE
Status: COMPLETED | OUTPATIENT
Start: 2023-12-29 | End: 2023-12-29

## 2023-12-29 RX ADMIN — LEVOFLOXACIN 750 MG: 5 INJECTION, SOLUTION INTRAVENOUS at 23:17

## 2023-12-29 RX ADMIN — VANCOMYCIN HYDROCHLORIDE 1750 MG: 10 INJECTION, POWDER, LYOPHILIZED, FOR SOLUTION INTRAVENOUS at 23:48

## 2023-12-29 RX ADMIN — SODIUM CHLORIDE, POTASSIUM CHLORIDE, SODIUM LACTATE AND CALCIUM CHLORIDE 1000 ML: 600; 310; 30; 20 INJECTION, SOLUTION INTRAVENOUS at 23:13

## 2023-12-29 RX ADMIN — ACETAMINOPHEN 650 MG: 650 SUPPOSITORY RECTAL at 22:50

## 2023-12-29 ASSESSMENT — ACTIVITIES OF DAILY LIVING (ADL): ADLS_ACUITY_SCORE: 37

## 2023-12-29 NOTE — TELEPHONE ENCOUNTER
Patient call:     Appointment type: RETURN ENDOCRINE   Provider: DR. LUCERO   Return date:RESCHEDULE 07/24/2024 SOONER SHRAVAN  SEE BELOW   Speciality phone number: [phone]   Additional appointment(s) needed:   Additional notes:  VOICE MAIL FULL NO MYC SO SENT  LETTER   Zayda Luther on 12/29/2023 at 9:19 AM      POSSIBLE DATES AVAILABLE    03/06/2024  3:30 PM DR. LUCERO  [place] [facility] [place]  03/11/2024 2:00PM DR. LUCERO VIRTUAL   03/13/2024 3:30PM DR. LUCERO VIRTUAL  03/25/2024 7:30 AM ,10:30  AM OR 11:30 AM DR. NIC MUÑOZ       Please note that the above appointment(s) will require manual scheduling as they are marked as SHRAVAN and will not appear using auto search. Do not schedule the patient if another patient has already been scheduled in the requested appointment slot.

## 2023-12-30 ENCOUNTER — APPOINTMENT (OUTPATIENT)
Dept: CT IMAGING | Facility: CLINIC | Age: 63
DRG: 871 | End: 2023-12-30
Attending: STUDENT IN AN ORGANIZED HEALTH CARE EDUCATION/TRAINING PROGRAM
Payer: MEDICARE

## 2023-12-30 LAB
ALBUMIN SERPL BCG-MCNC: 2.8 G/DL (ref 3.5–5.2)
ALP SERPL-CCNC: 63 U/L (ref 40–150)
ALT SERPL W P-5'-P-CCNC: 21 U/L (ref 0–70)
ANION GAP SERPL CALCULATED.3IONS-SCNC: 11 MMOL/L (ref 7–15)
ANION GAP SERPL CALCULATED.3IONS-SCNC: 8 MMOL/L (ref 7–15)
AST SERPL W P-5'-P-CCNC: 32 U/L (ref 0–45)
ATRIAL RATE - MUSE: 69 BPM
BASE EXCESS BLDV CALC-SCNC: 3.7 MMOL/L (ref -7.7–1.9)
BASE EXCESS BLDV CALC-SCNC: 3.9 MMOL/L (ref -7.7–1.9)
BILIRUB SERPL-MCNC: 0.2 MG/DL
BUN SERPL-MCNC: 17.2 MG/DL (ref 8–23)
BUN SERPL-MCNC: 20.9 MG/DL (ref 8–23)
C PNEUM DNA SPEC QL NAA+PROBE: NOT DETECTED
CA-I BLD-MCNC: 4.3 MG/DL (ref 4.4–5.2)
CALCIUM SERPL-MCNC: 7.8 MG/DL (ref 8.8–10.2)
CALCIUM SERPL-MCNC: 8.1 MG/DL (ref 8.8–10.2)
CHLORIDE SERPL-SCNC: 103 MMOL/L (ref 98–107)
CHLORIDE SERPL-SCNC: 109 MMOL/L (ref 98–107)
CREAT SERPL-MCNC: 0.93 MG/DL (ref 0.67–1.17)
CREAT SERPL-MCNC: 1.04 MG/DL (ref 0.67–1.17)
DEPRECATED HCO3 PLAS-SCNC: 26 MMOL/L (ref 22–29)
DEPRECATED HCO3 PLAS-SCNC: 29 MMOL/L (ref 22–29)
DIASTOLIC BLOOD PRESSURE - MUSE: NORMAL MMHG
EGFRCR SERPLBLD CKD-EPI 2021: 82 ML/MIN/1.73M2
EGFRCR SERPLBLD CKD-EPI 2021: >90 ML/MIN/1.73M2
ERYTHROCYTE [DISTWIDTH] IN BLOOD BY AUTOMATED COUNT: 13.7 % (ref 10–15)
ERYTHROCYTE [DISTWIDTH] IN BLOOD BY AUTOMATED COUNT: 13.9 % (ref 10–15)
FLUAV H1 2009 PAND RNA SPEC QL NAA+PROBE: NOT DETECTED
FLUAV H1 RNA SPEC QL NAA+PROBE: NOT DETECTED
FLUAV H3 RNA SPEC QL NAA+PROBE: NOT DETECTED
FLUAV RNA SPEC QL NAA+PROBE: NOT DETECTED
FLUBV RNA SPEC QL NAA+PROBE: NOT DETECTED
GLUCOSE BLDC GLUCOMTR-MCNC: 168 MG/DL (ref 70–99)
GLUCOSE BLDC GLUCOMTR-MCNC: 173 MG/DL (ref 70–99)
GLUCOSE BLDC GLUCOMTR-MCNC: 201 MG/DL (ref 70–99)
GLUCOSE BLDC GLUCOMTR-MCNC: 232 MG/DL (ref 70–99)
GLUCOSE SERPL-MCNC: 210 MG/DL (ref 70–99)
GLUCOSE SERPL-MCNC: 245 MG/DL (ref 70–99)
HADV DNA SPEC QL NAA+PROBE: NOT DETECTED
HCO3 BLDV-SCNC: 26 MMOL/L (ref 21–28)
HCO3 BLDV-SCNC: 28 MMOL/L (ref 21–28)
HCO3 BLDV-SCNC: 29 MMOL/L (ref 21–28)
HCOV PNL SPEC NAA+PROBE: NOT DETECTED
HCT VFR BLD AUTO: 30.4 % (ref 40–53)
HCT VFR BLD AUTO: 34.7 % (ref 40–53)
HGB BLD-MCNC: 10 G/DL (ref 13.3–17.7)
HGB BLD-MCNC: 11.5 G/DL (ref 13.3–17.7)
HMPV RNA SPEC QL NAA+PROBE: NOT DETECTED
HPIV1 RNA SPEC QL NAA+PROBE: NOT DETECTED
HPIV2 RNA SPEC QL NAA+PROBE: NOT DETECTED
HPIV3 RNA SPEC QL NAA+PROBE: NOT DETECTED
HPIV4 RNA SPEC QL NAA+PROBE: NOT DETECTED
INTERPRETATION ECG - MUSE: NORMAL
LACTATE BLD-SCNC: 2.8 MMOL/L
LACTATE SERPL-SCNC: 1.5 MMOL/L (ref 0.7–2)
LACTATE SERPL-SCNC: 2.8 MMOL/L (ref 0.7–2)
LACTATE SERPL-SCNC: 3.5 MMOL/L (ref 0.7–2)
LACTATE SERPL-SCNC: 3.7 MMOL/L (ref 0.7–2)
M PNEUMO DNA SPEC QL NAA+PROBE: NOT DETECTED
MAGNESIUM SERPL-MCNC: 2.1 MG/DL (ref 1.7–2.3)
MCH RBC QN AUTO: 29.9 PG (ref 26.5–33)
MCH RBC QN AUTO: 30.2 PG (ref 26.5–33)
MCHC RBC AUTO-ENTMCNC: 32.9 G/DL (ref 31.5–36.5)
MCHC RBC AUTO-ENTMCNC: 33.1 G/DL (ref 31.5–36.5)
MCV RBC AUTO: 91 FL (ref 78–100)
MCV RBC AUTO: 91 FL (ref 78–100)
O2/TOTAL GAS SETTING VFR VENT: 32 %
O2/TOTAL GAS SETTING VFR VENT: 32 %
OXYHGB MFR BLDV: 77 % (ref 70–75)
P AXIS - MUSE: 69 DEGREES
PCO2 BLDV: 42 MM HG (ref 40–50)
PCO2 BLDV: 43 MM HG (ref 40–50)
PCO2 BLDV: 46 MM HG (ref 40–50)
PH BLDV: 7.41 [PH] (ref 7.32–7.43)
PH BLDV: 7.41 [PH] (ref 7.32–7.43)
PH BLDV: 7.43 [PH] (ref 7.32–7.43)
PHOSPHATE SERPL-MCNC: 1.3 MG/DL (ref 2.5–4.5)
PLATELET # BLD AUTO: 293 10E3/UL (ref 150–450)
PLATELET # BLD AUTO: 336 10E3/UL (ref 150–450)
PO2 BLDV: 43 MM HG (ref 25–47)
PO2 BLDV: 51 MM HG (ref 25–47)
PO2 BLDV: 56 MM HG (ref 25–47)
POTASSIUM SERPL-SCNC: 3.4 MMOL/L (ref 3.4–5.3)
POTASSIUM SERPL-SCNC: 3.8 MMOL/L (ref 3.4–5.3)
POTASSIUM SERPL-SCNC: 4 MMOL/L (ref 3.4–5.3)
PR INTERVAL - MUSE: 172 MS
PROCALCITONIN SERPL IA-MCNC: 0.13 NG/ML
PROCALCITONIN SERPL IA-MCNC: 0.35 NG/ML
PROT SERPL-MCNC: 5.8 G/DL (ref 6.4–8.3)
QRS DURATION - MUSE: 90 MS
QT - MUSE: 430 MS
QTC - MUSE: 460 MS
R AXIS - MUSE: -32 DEGREES
RBC # BLD AUTO: 3.35 10E6/UL (ref 4.4–5.9)
RBC # BLD AUTO: 3.81 10E6/UL (ref 4.4–5.9)
RSV RNA SPEC QL NAA+PROBE: NOT DETECTED
RSV RNA SPEC QL NAA+PROBE: NOT DETECTED
RV+EV RNA SPEC QL NAA+PROBE: NOT DETECTED
SAO2 % BLDV: 89 % (ref 94–100)
SODIUM SERPL-SCNC: 140 MMOL/L (ref 135–145)
SODIUM SERPL-SCNC: 146 MMOL/L (ref 135–145)
SYSTOLIC BLOOD PRESSURE - MUSE: NORMAL MMHG
T AXIS - MUSE: 46 DEGREES
VENTRICULAR RATE- MUSE: 69 BPM
WBC # BLD AUTO: 14.2 10E3/UL (ref 4–11)
WBC # BLD AUTO: 18.3 10E3/UL (ref 4–11)

## 2023-12-30 PROCEDURE — 250N000009 HC RX 250: Performed by: INTERNAL MEDICINE

## 2023-12-30 PROCEDURE — 250N000013 HC RX MED GY IP 250 OP 250 PS 637: Performed by: INTERNAL MEDICINE

## 2023-12-30 PROCEDURE — 250N000012 HC RX MED GY IP 250 OP 636 PS 637: Performed by: INTERNAL MEDICINE

## 2023-12-30 PROCEDURE — 200N000001 HC R&B ICU

## 2023-12-30 PROCEDURE — 99292 CRITICAL CARE ADDL 30 MIN: CPT | Performed by: INTERNAL MEDICINE

## 2023-12-30 PROCEDURE — 96367 TX/PROPH/DG ADDL SEQ IV INF: CPT

## 2023-12-30 PROCEDURE — 250N000011 HC RX IP 250 OP 636: Performed by: STUDENT IN AN ORGANIZED HEALTH CARE EDUCATION/TRAINING PROGRAM

## 2023-12-30 PROCEDURE — 82330 ASSAY OF CALCIUM: CPT | Performed by: INTERNAL MEDICINE

## 2023-12-30 PROCEDURE — 36415 COLL VENOUS BLD VENIPUNCTURE: CPT

## 2023-12-30 PROCEDURE — 250N000011 HC RX IP 250 OP 636: Mod: JZ | Performed by: EMERGENCY MEDICINE

## 2023-12-30 PROCEDURE — 96375 TX/PRO/DX INJ NEW DRUG ADDON: CPT

## 2023-12-30 PROCEDURE — 85014 HEMATOCRIT: CPT | Performed by: INTERNAL MEDICINE

## 2023-12-30 PROCEDURE — 85027 COMPLETE CBC AUTOMATED: CPT | Performed by: INTERNAL MEDICINE

## 2023-12-30 PROCEDURE — 258N000003 HC RX IP 258 OP 636: Performed by: STUDENT IN AN ORGANIZED HEALTH CARE EDUCATION/TRAINING PROGRAM

## 2023-12-30 PROCEDURE — 83605 ASSAY OF LACTIC ACID: CPT | Performed by: INTERNAL MEDICINE

## 2023-12-30 PROCEDURE — 84132 ASSAY OF SERUM POTASSIUM: CPT | Performed by: INTERNAL MEDICINE

## 2023-12-30 PROCEDURE — 96360 HYDRATION IV INFUSION INIT: CPT

## 2023-12-30 PROCEDURE — 82803 BLOOD GASES ANY COMBINATION: CPT

## 2023-12-30 PROCEDURE — 87633 RESP VIRUS 12-25 TARGETS: CPT | Performed by: INTERNAL MEDICINE

## 2023-12-30 PROCEDURE — 250N000011 HC RX IP 250 OP 636: Performed by: INTERNAL MEDICINE

## 2023-12-30 PROCEDURE — 36592 COLLECT BLOOD FROM PICC: CPT | Performed by: INTERNAL MEDICINE

## 2023-12-30 PROCEDURE — 96361 HYDRATE IV INFUSION ADD-ON: CPT

## 2023-12-30 PROCEDURE — 83605 ASSAY OF LACTIC ACID: CPT

## 2023-12-30 PROCEDURE — 82803 BLOOD GASES ANY COMBINATION: CPT | Performed by: INTERNAL MEDICINE

## 2023-12-30 PROCEDURE — 250N000009 HC RX 250: Performed by: STUDENT IN AN ORGANIZED HEALTH CARE EDUCATION/TRAINING PROGRAM

## 2023-12-30 PROCEDURE — 258N000003 HC RX IP 258 OP 636: Performed by: INTERNAL MEDICINE

## 2023-12-30 PROCEDURE — 82805 BLOOD GASES W/O2 SATURATION: CPT | Performed by: INTERNAL MEDICINE

## 2023-12-30 PROCEDURE — 80069 RENAL FUNCTION PANEL: CPT | Performed by: INTERNAL MEDICINE

## 2023-12-30 PROCEDURE — 83735 ASSAY OF MAGNESIUM: CPT | Performed by: INTERNAL MEDICINE

## 2023-12-30 PROCEDURE — 87581 M.PNEUMON DNA AMP PROBE: CPT | Performed by: INTERNAL MEDICINE

## 2023-12-30 PROCEDURE — 96366 THER/PROPH/DIAG IV INF ADDON: CPT

## 2023-12-30 PROCEDURE — 84145 PROCALCITONIN (PCT): CPT | Performed by: INTERNAL MEDICINE

## 2023-12-30 PROCEDURE — 99291 CRITICAL CARE FIRST HOUR: CPT | Performed by: INTERNAL MEDICINE

## 2023-12-30 PROCEDURE — 82962 GLUCOSE BLOOD TEST: CPT

## 2023-12-30 PROCEDURE — 71260 CT THORAX DX C+: CPT | Mod: MG

## 2023-12-30 PROCEDURE — 84100 ASSAY OF PHOSPHORUS: CPT | Performed by: INTERNAL MEDICINE

## 2023-12-30 PROCEDURE — 250N000013 HC RX MED GY IP 250 OP 250 PS 637: Performed by: STUDENT IN AN ORGANIZED HEALTH CARE EDUCATION/TRAINING PROGRAM

## 2023-12-30 RX ORDER — METOCLOPRAMIDE HYDROCHLORIDE 5 MG/5ML
10 SOLUTION ORAL
Status: DISCONTINUED | OUTPATIENT
Start: 2023-12-30 | End: 2024-01-04 | Stop reason: HOSPADM

## 2023-12-30 RX ORDER — ROPIVACAINE IN 0.9% SOD CHL/PF 0.1 %
.03-.125 PLASTIC BAG, INJECTION (ML) EPIDURAL CONTINUOUS
Status: DISCONTINUED | OUTPATIENT
Start: 2023-12-30 | End: 2023-12-30

## 2023-12-30 RX ORDER — CALCIUM GLUCONATE 20 MG/ML
1 INJECTION, SOLUTION INTRAVENOUS ONCE
Status: COMPLETED | OUTPATIENT
Start: 2023-12-30 | End: 2023-12-30

## 2023-12-30 RX ORDER — AMOXICILLIN 250 MG
1 CAPSULE ORAL 2 TIMES DAILY PRN
Status: DISCONTINUED | OUTPATIENT
Start: 2023-12-30 | End: 2023-12-30

## 2023-12-30 RX ORDER — CHOLECALCIFEROL (VITAMIN D3) 50 MCG
50 TABLET ORAL DAILY
Status: DISCONTINUED | OUTPATIENT
Start: 2023-12-30 | End: 2024-01-04 | Stop reason: HOSPADM

## 2023-12-30 RX ORDER — HEPARIN SODIUM 5000 [USP'U]/.5ML
5000 INJECTION, SOLUTION INTRAVENOUS; SUBCUTANEOUS EVERY 8 HOURS
Status: DISCONTINUED | OUTPATIENT
Start: 2023-12-30 | End: 2024-01-04 | Stop reason: HOSPADM

## 2023-12-30 RX ORDER — CARBAMAZEPINE 100 MG/5ML
100 SUSPENSION ORAL DAILY
Status: DISCONTINUED | OUTPATIENT
Start: 2023-12-30 | End: 2024-01-04 | Stop reason: HOSPADM

## 2023-12-30 RX ORDER — POTASSIUM CHLORIDE 1.5 G/1.58G
40 POWDER, FOR SOLUTION ORAL ONCE
Status: COMPLETED | OUTPATIENT
Start: 2023-12-30 | End: 2023-12-30

## 2023-12-30 RX ORDER — DEXTROSE MONOHYDRATE 25 G/50ML
25-50 INJECTION, SOLUTION INTRAVENOUS
Status: DISCONTINUED | OUTPATIENT
Start: 2023-12-30 | End: 2024-01-04 | Stop reason: HOSPADM

## 2023-12-30 RX ORDER — METRONIDAZOLE 500 MG/100ML
500 INJECTION, SOLUTION INTRAVENOUS EVERY 12 HOURS
Status: DISCONTINUED | OUTPATIENT
Start: 2023-12-30 | End: 2023-12-30

## 2023-12-30 RX ORDER — ONDANSETRON 2 MG/ML
4 INJECTION INTRAMUSCULAR; INTRAVENOUS EVERY 6 HOURS PRN
Status: DISCONTINUED | OUTPATIENT
Start: 2023-12-30 | End: 2023-12-30

## 2023-12-30 RX ORDER — CEFAZOLIN SODIUM 1 G/50ML
750 SOLUTION INTRAVENOUS EVERY 12 HOURS
Status: DISCONTINUED | OUTPATIENT
Start: 2023-12-30 | End: 2023-12-31

## 2023-12-30 RX ORDER — MUPIROCIN 20 MG/G
OINTMENT TOPICAL 2 TIMES DAILY
Status: DISCONTINUED | OUTPATIENT
Start: 2023-12-30 | End: 2024-01-04 | Stop reason: HOSPADM

## 2023-12-30 RX ORDER — IPRATROPIUM BROMIDE AND ALBUTEROL SULFATE 2.5; .5 MG/3ML; MG/3ML
3 SOLUTION RESPIRATORY (INHALATION) EVERY 4 HOURS PRN
Status: DISCONTINUED | OUTPATIENT
Start: 2023-12-30 | End: 2024-01-04 | Stop reason: HOSPADM

## 2023-12-30 RX ORDER — ACETAMINOPHEN 325 MG/1
650 TABLET ORAL EVERY 4 HOURS PRN
Status: DISCONTINUED | OUTPATIENT
Start: 2023-12-30 | End: 2024-01-04 | Stop reason: HOSPADM

## 2023-12-30 RX ORDER — ACETAMINOPHEN 325 MG/1
650 TABLET ORAL EVERY 4 HOURS PRN
Status: DISCONTINUED | OUTPATIENT
Start: 2023-12-30 | End: 2023-12-30

## 2023-12-30 RX ORDER — GLYCOPYRROLATE 1 MG/1
1 TABLET ORAL 2 TIMES DAILY PRN
Status: DISCONTINUED | OUTPATIENT
Start: 2023-12-30 | End: 2024-01-04 | Stop reason: HOSPADM

## 2023-12-30 RX ORDER — FLUDROCORTISONE ACETATE 0.1 MG/1
0.1 TABLET ORAL DAILY
Status: DISCONTINUED | OUTPATIENT
Start: 2023-12-30 | End: 2024-01-04 | Stop reason: HOSPADM

## 2023-12-30 RX ORDER — AMOXICILLIN 250 MG
1 CAPSULE ORAL 2 TIMES DAILY PRN
Status: DISCONTINUED | OUTPATIENT
Start: 2023-12-30 | End: 2024-01-04 | Stop reason: HOSPADM

## 2023-12-30 RX ORDER — ACETAMINOPHEN 650 MG/1
650 SUPPOSITORY RECTAL EVERY 4 HOURS PRN
Status: DISCONTINUED | OUTPATIENT
Start: 2023-12-30 | End: 2023-12-30

## 2023-12-30 RX ORDER — ONDANSETRON 2 MG/ML
4 INJECTION INTRAMUSCULAR; INTRAVENOUS EVERY 6 HOURS PRN
Status: DISCONTINUED | OUTPATIENT
Start: 2023-12-30 | End: 2024-01-04 | Stop reason: HOSPADM

## 2023-12-30 RX ORDER — NICOTINE POLACRILEX 4 MG
15-30 LOZENGE BUCCAL
Status: DISCONTINUED | OUTPATIENT
Start: 2023-12-30 | End: 2023-12-30

## 2023-12-30 RX ORDER — ONDANSETRON 4 MG/1
4 TABLET, ORALLY DISINTEGRATING ORAL EVERY 6 HOURS PRN
Status: DISCONTINUED | OUTPATIENT
Start: 2023-12-30 | End: 2024-01-04 | Stop reason: HOSPADM

## 2023-12-30 RX ORDER — DEXTROSE MONOHYDRATE 25 G/50ML
25-50 INJECTION, SOLUTION INTRAVENOUS
Status: DISCONTINUED | OUTPATIENT
Start: 2023-12-30 | End: 2023-12-30

## 2023-12-30 RX ORDER — SODIUM CHLORIDE 9 MG/ML
INJECTION, SOLUTION INTRAVENOUS CONTINUOUS
Status: DISCONTINUED | OUTPATIENT
Start: 2023-12-30 | End: 2023-12-31

## 2023-12-30 RX ORDER — NICOTINE POLACRILEX 4 MG
15-30 LOZENGE BUCCAL
Status: DISCONTINUED | OUTPATIENT
Start: 2023-12-30 | End: 2024-01-04 | Stop reason: HOSPADM

## 2023-12-30 RX ORDER — IOPAMIDOL 755 MG/ML
81 INJECTION, SOLUTION INTRAVASCULAR ONCE
Status: COMPLETED | OUTPATIENT
Start: 2023-12-30 | End: 2023-12-30

## 2023-12-30 RX ORDER — ACETAMINOPHEN 650 MG/1
650 SUPPOSITORY RECTAL EVERY 4 HOURS PRN
Status: DISCONTINUED | OUTPATIENT
Start: 2023-12-30 | End: 2024-01-04 | Stop reason: HOSPADM

## 2023-12-30 RX ORDER — LEVOFLOXACIN 5 MG/ML
500 INJECTION, SOLUTION INTRAVENOUS EVERY 24 HOURS
Status: DISCONTINUED | OUTPATIENT
Start: 2023-12-30 | End: 2023-12-30

## 2023-12-30 RX ORDER — GUAIFENESIN 200 MG/10ML
200 LIQUID ORAL EVERY 4 HOURS PRN
Status: DISCONTINUED | OUTPATIENT
Start: 2023-12-30 | End: 2024-01-04 | Stop reason: HOSPADM

## 2023-12-30 RX ORDER — AMOXICILLIN 250 MG
2 CAPSULE ORAL 2 TIMES DAILY PRN
Status: DISCONTINUED | OUTPATIENT
Start: 2023-12-30 | End: 2024-01-04 | Stop reason: HOSPADM

## 2023-12-30 RX ORDER — LEVOFLOXACIN 5 MG/ML
750 INJECTION, SOLUTION INTRAVENOUS EVERY 24 HOURS
Status: DISCONTINUED | OUTPATIENT
Start: 2023-12-30 | End: 2024-01-03

## 2023-12-30 RX ORDER — TESTOSTERONE CYPIONATE 200 MG/ML
50 INJECTION, SOLUTION INTRAMUSCULAR WEEKLY
Status: DISCONTINUED | OUTPATIENT
Start: 2023-12-30 | End: 2024-01-04 | Stop reason: HOSPADM

## 2023-12-30 RX ORDER — CARBAMAZEPINE 100 MG/5ML
150 SUSPENSION ORAL 3 TIMES DAILY
Status: DISCONTINUED | OUTPATIENT
Start: 2023-12-30 | End: 2023-12-30

## 2023-12-30 RX ORDER — NOREPINEPHRINE BITARTRATE 0.02 MG/ML
.01-.6 INJECTION, SOLUTION INTRAVENOUS CONTINUOUS
Status: DISCONTINUED | OUTPATIENT
Start: 2023-12-30 | End: 2023-12-30

## 2023-12-30 RX ORDER — ASCORBIC ACID 500 MG
1000 TABLET ORAL DAILY
Status: DISCONTINUED | OUTPATIENT
Start: 2023-12-30 | End: 2024-01-04 | Stop reason: HOSPADM

## 2023-12-30 RX ORDER — NOREPINEPHRINE BITARTRATE 0.02 MG/ML
.01-.6 INJECTION, SOLUTION INTRAVENOUS CONTINUOUS
Status: DISCONTINUED | OUTPATIENT
Start: 2023-12-30 | End: 2023-12-31

## 2023-12-30 RX ORDER — ACETAMINOPHEN 325 MG/10.15ML
650 LIQUID ORAL EVERY 4 HOURS PRN
Status: DISCONTINUED | OUTPATIENT
Start: 2023-12-30 | End: 2024-01-04 | Stop reason: HOSPADM

## 2023-12-30 RX ORDER — ONDANSETRON 4 MG/1
4 TABLET, ORALLY DISINTEGRATING ORAL EVERY 6 HOURS PRN
Status: DISCONTINUED | OUTPATIENT
Start: 2023-12-30 | End: 2023-12-30

## 2023-12-30 RX ORDER — AMOXICILLIN 250 MG
2 CAPSULE ORAL 2 TIMES DAILY PRN
Status: DISCONTINUED | OUTPATIENT
Start: 2023-12-30 | End: 2023-12-30

## 2023-12-30 RX ORDER — CARBAMAZEPINE 100 MG/5ML
150 SUSPENSION ORAL 3 TIMES DAILY
Status: DISCONTINUED | OUTPATIENT
Start: 2023-12-30 | End: 2024-01-04 | Stop reason: HOSPADM

## 2023-12-30 RX ORDER — DEXTROSE MONOHYDRATE 100 MG/ML
INJECTION, SOLUTION INTRAVENOUS CONTINUOUS PRN
Status: DISCONTINUED | OUTPATIENT
Start: 2023-12-30 | End: 2024-01-04 | Stop reason: HOSPADM

## 2023-12-30 RX ADMIN — POTASSIUM CHLORIDE 40 MEQ: 1.5 POWDER, FOR SOLUTION ORAL at 18:32

## 2023-12-30 RX ADMIN — METRONIDAZOLE 500 MG: 500 INJECTION, SOLUTION INTRAVENOUS at 08:33

## 2023-12-30 RX ADMIN — SODIUM CHLORIDE: 9 INJECTION, SOLUTION INTRAVENOUS at 15:00

## 2023-12-30 RX ADMIN — SODIUM CHLORIDE, POTASSIUM CHLORIDE, SODIUM LACTATE AND CALCIUM CHLORIDE 1000 ML: 600; 310; 30; 20 INJECTION, SOLUTION INTRAVENOUS at 00:28

## 2023-12-30 RX ADMIN — POTASSIUM & SODIUM PHOSPHATES POWDER PACK 280-160-250 MG 2 PACKET: 280-160-250 PACK at 18:32

## 2023-12-30 RX ADMIN — BRIVARACETAM 100 MG: 10 SOLUTION ORAL at 21:09

## 2023-12-30 RX ADMIN — INSULIN ASPART 1 UNITS: 100 INJECTION, SOLUTION INTRAVENOUS; SUBCUTANEOUS at 18:57

## 2023-12-30 RX ADMIN — BRIVARACETAM 100 MG: 10 SOLUTION ORAL at 09:00

## 2023-12-30 RX ADMIN — CALCIUM GLUCONATE 1 G: 20 INJECTION, SOLUTION INTRAVENOUS at 18:33

## 2023-12-30 RX ADMIN — Medication 60 ML: at 11:32

## 2023-12-30 RX ADMIN — OXYCODONE HYDROCHLORIDE AND ACETAMINOPHEN 1000 MG: 500 TABLET ORAL at 11:32

## 2023-12-30 RX ADMIN — HYDROCORTISONE SODIUM SUCCINATE 100 MG: 100 INJECTION, POWDER, FOR SOLUTION INTRAMUSCULAR; INTRAVENOUS at 01:01

## 2023-12-30 RX ADMIN — Medication 0.03 MCG/KG/MIN: at 06:10

## 2023-12-30 RX ADMIN — CYANOCOBALAMIN TAB 1000 MCG 2500 MCG: 1000 TAB at 20:17

## 2023-12-30 RX ADMIN — Medication 50 MCG: at 11:32

## 2023-12-30 RX ADMIN — SODIUM CHLORIDE, POTASSIUM CHLORIDE, SODIUM LACTATE AND CALCIUM CHLORIDE 1000 ML: 600; 310; 30; 20 INJECTION, SOLUTION INTRAVENOUS at 02:43

## 2023-12-30 RX ADMIN — NOREPINEPHRINE BITARTRATE 0.25 MCG/KG/MIN: 0.02 INJECTION, SOLUTION INTRAVENOUS at 10:44

## 2023-12-30 RX ADMIN — HYDROCORTISONE SODIUM SUCCINATE 100 MG: 100 INJECTION, POWDER, FOR SOLUTION INTRAMUSCULAR; INTRAVENOUS at 08:40

## 2023-12-30 RX ADMIN — LEVOFLOXACIN 750 MG: 5 INJECTION, SOLUTION INTRAVENOUS at 21:02

## 2023-12-30 RX ADMIN — SODIUM CHLORIDE 65 ML: 9 INJECTION, SOLUTION INTRAVENOUS at 01:37

## 2023-12-30 RX ADMIN — FLUDROCORTISONE ACETATE 0.1 MG: 0.1 TABLET ORAL at 18:32

## 2023-12-30 RX ADMIN — HYDROCORTISONE SODIUM SUCCINATE 100 MG: 100 INJECTION, POWDER, FOR SOLUTION INTRAMUSCULAR; INTRAVENOUS at 16:58

## 2023-12-30 RX ADMIN — POTASSIUM & SODIUM PHOSPHATES POWDER PACK 280-160-250 MG 2 PACKET: 280-160-250 PACK at 21:09

## 2023-12-30 RX ADMIN — CARBAMAZEPINE 150 MG: 100 SUSPENSION ORAL at 09:05

## 2023-12-30 RX ADMIN — Medication 0.03 MCG/KG/MIN: at 02:16

## 2023-12-30 RX ADMIN — CARBAMAZEPINE 150 MG: 100 SUSPENSION ORAL at 04:52

## 2023-12-30 RX ADMIN — IOPAMIDOL 81 ML: 755 INJECTION, SOLUTION INTRAVENOUS at 01:37

## 2023-12-30 RX ADMIN — VANCOMYCIN HYDROCHLORIDE 750 MG: 500 INJECTION, POWDER, LYOPHILIZED, FOR SOLUTION INTRAVENOUS at 11:51

## 2023-12-30 RX ADMIN — SODIUM CHLORIDE: 9 INJECTION, SOLUTION INTRAVENOUS at 06:14

## 2023-12-30 RX ADMIN — HEPARIN SODIUM 5000 UNITS: 5000 INJECTION, SOLUTION INTRAVENOUS; SUBCUTANEOUS at 21:09

## 2023-12-30 RX ADMIN — INSULIN ASPART 1 UNITS: 100 INJECTION, SOLUTION INTRAVENOUS; SUBCUTANEOUS at 21:01

## 2023-12-30 RX ADMIN — NOREPINEPHRINE BITARTRATE 0.02 MCG/KG/MIN: 0.02 INJECTION, SOLUTION INTRAVENOUS at 16:50

## 2023-12-30 RX ADMIN — CARBAMAZEPINE 150 MG: 100 SUSPENSION ORAL at 15:00

## 2023-12-30 RX ADMIN — CARBAMAZEPINE 150 MG: 100 SUSPENSION ORAL at 11:33

## 2023-12-30 ASSESSMENT — ACTIVITIES OF DAILY LIVING (ADL)
ADLS_ACUITY_SCORE: 37
ADLS_ACUITY_SCORE: 51
ADLS_ACUITY_SCORE: 37
ADLS_ACUITY_SCORE: 51
ADLS_ACUITY_SCORE: 37
ADLS_ACUITY_SCORE: 51

## 2023-12-30 NOTE — ED NOTES
Pharmacy called to perform medication reconciliation and verify patient's morning medications so they may be administered.

## 2023-12-30 NOTE — ED NOTES
Bed: ST02  Expected date:   Expected time:   Means of arrival:   Comments:  Kerri 2 63m sepsis alert. , BP 98/61, ETCO2 9, SPO2 95% on axygen   Include Z78.9 (Other Specified Conditions Influencing Health Status) As An Associated Diagnosis?: No Post-Care Instructions: I reviewed with the patient in detail post-care instructions. Patient is to wear sunprotection, and avoid picking at any of the treated lesions. Pt may apply Vaseline to crusted or scabbing areas. Medical Necessity Clause: This procedure was medically necessary because the lesions that were treated were: Consent: The patient's consent was obtained including but not limited to risks of crusting, scabbing, blistering, scarring, darker or lighter pigmentary change, recurrence, incomplete removal and infection. Medical Necessity Information: It is in your best interest to select a reason for this procedure from the list below. All of these items fulfill various CMS LCD requirements except the new and changing color options. Detail Level: Detailed Number Of Freeze-Thaw Cycles: 2 freeze-thaw cycles Duration Of Freeze Thaw-Cycle (Seconds): 8 Detail Level: Simple

## 2023-12-30 NOTE — ED NOTES
TF started at rate of 15 ml per nutrition order. Patient has been resting with eyes closed, no signs of distress at this time. Levophed infusion has been titrated, patient tolerating well at this time.

## 2023-12-30 NOTE — PHARMACY-ADMISSION MEDICATION HISTORY
Pharmacist Admission Medication History    Admission medication history is complete. The information provided in this note is only as accurate as the sources available at the time of the update.    Information Source(s): Mother/Family member and CareEverywhere/SureScripts via phone    Pertinent Information: Mother is caretaker. Pt is intubated    Changes made to PTA medication list:  Added: None  Deleted: None  Changed: None       Allergies reviewed with patient and updates made in EHR: no    Medication History Completed By: Jean Paul Sarabia RPH 12/30/2023 5:51 PM    PTA Med List   Medication Sig Last Dose    acetaminophen (TYLENOL) 325 MG tablet Take 650 mg by mouth every 6 hours as needed for mild pain Unknown    albuterol (PROVENTIL) (5 MG/ML) 0.5% neb solution Take 0.5 mLs (2.5 mg) by nebulization every 6 hours as needed for shortness of breath, wheezing or cough 0700 1100 1500 1900 with mucomyst Unknown    bacitracin 500 UNIT/GM external ointment Apply topically daily as needed for wound care To PEG site. Unknown    Brivaracetam (BRIVIACT) 10 MG/ML solution 100 mg by Oral or Feeding Tube route 2 times daily 0900, 2100 12/30/2023 at am    carBAMazepine (TEGRETOL) 100 MG/5ML suspension 7.5 mLs (150 mg) by Oral or Feeding Tube route 3 times daily At 06:00, 12:00, and 24:00 for seizures 12/30/2023 at x2 doses    carBAMazepine (TEGRETOL) 100 MG/5ML suspension 100 mg by Per Feeding Tube route daily Take at 1800 12/29/2023    COMPOUNDED NON-CONTROLLED SUBSTANCE (CMPD RX) - PHARMACY TO MIX COMPOUNDED MEDICATION Scopolamine 0.4mg capsules - take  2 capsule by feeding tube three times daily as needed Unknown    Cyanocobalamin (VITAMIN B-12 PO) 2,500 mcg by Per Feeding Tube route daily 12/29/2023    glycopyrrolate (ROBINUL) 1 MG tablet Take 1 tablet (1 mg) by mouth 2 times daily as needed (secretions) 12/29/2023    guaiFENesin (MUCINEX) 600 MG 12 hr tablet Take 1 tablet (600 mg) by mouth 2 times daily as needed for  congestion 12/29/2023 at am    hydrocortisone (CORTAID) 1 % external cream Apply topically 2 times daily as needed Apply to reddened memo areas as needed Unknown    hydrocortisone (CORTEF) 5 MG tablet Take 15 mg (3 tablets) in the morning and 7.5 mg (1.5 tablet)  at 2:00 PM. During illness patient takes more as a stress dose. Please increase the dose as directed. (Patient taking differently: Take 15 mg (3 tablets) in the morning and 7.5 mg (1 tablet)  at 6:00 PM. Per feeding tube. During illness patient takes more as a stress dose. Mother reports doubling each dose for fever) 12/29/2023 at pm    levothyroxine (SYNTHROID/LEVOTHROID) 125 MCG tablet Take 1 tablet (125 mcg) by mouth daily 12/29/2023    metoclopramide (REGLAN) 10 MG/10ML SOLN solution Take 10 mLs (10 mg) by mouth 4 times daily (before meals and nightly) 0800, 1200, 1600, 2000 Disconnects bag before administration, then waits 45 mins before reconnecting after giving the medication 12/29/2023    miconazole (MICATIN) 2 % external powder Apply topically 2 times daily as needed 12/29/2023    multivitamin, therapeutic (THERA-VIT) TABS tablet 1 tablet by Per Feeding Tube route daily 12/29/2023    mupirocin (BACTROBAN) 2 % external ointment APPLY TOPICALLY TO THE AFFECTED AREA TWICE DAILY AS NEEDED Unknown    pantoprazole (PROTONIX) 2 mg/mL SUSP suspension TAKE 20ML PER FEEDING TUBE DAILY 12/29/2023    testosterone cypionate (DEPOTESTOSTERONE) 200 MG/ML injection Inject 0.25 mLs (50 mg) into the muscle once a week 12/27/2023    vitamin C (ASCORBIC ACID) 1000 MG TABS 1,000 mg by Oral or Feeding Tube route daily  12/29/2023    vitamin D3 (CHOLECALCIFEROL) 2000 units (50 mcg) tablet 2,000 Units by Per Feeding Tube route daily 12/29/2023

## 2023-12-30 NOTE — PROGRESS NOTES
Chart reviewed    Severe sepsis/septic shock in setting of suspected aspiration. Similar hospitalizations in the past.    I scheduled his antibiotics, hydrocortisone etc and ordered labs for this morning. Tele-ICU will continue to follow until he gets a bed.    Felix Coelho MD  Pulmonary, Critical Care and Sleep Medicine  [facility]  Pager: [phone]

## 2023-12-30 NOTE — ED NOTES
Patient had episode of unresponsiveness even with sternal rub. Patient's oxygen saturation 70%. Patient placed on 8L oxygen via oxymask. Patient then aroused to verbal and tactile stimuli. Dr. Suresh at bedside. Repeat VBG  obtained.     Patient currently on 6L oxygen at 100%.

## 2023-12-30 NOTE — ED NOTES
Patient repositioned with pillows to right side lying. Pillows placed between knees as well. Patient has large, open red area of skin to sacral/coccyx area.  Supporitve wound dressing applied to area.

## 2023-12-30 NOTE — ED TRIAGE NOTES
Pt comes in by EMS, mother who is primary caretaker called d/t feeling like his respirations were high, 88-90% with EMS, placed on 4L. Temp 99.3. hx recent covid. Bps initially systolic 88, last 98/56. Blood sugar 352. etCO2 14-15. Respirations with EMS mid 20's

## 2023-12-30 NOTE — TELEPHONE ENCOUNTER
Nurse Triage SBAR    Situation: Rapid breathing    Background: MotherSavannah, calling. Consent: on file in chart. Pt is non verbal.     Assessment: Breathing faster then normal. RR: 39. BP: 107/64 Pulse: 128. Hears a rattle in his chest. Temporal temp: 98.2    Protocol Recommended Disposition: Call 911    Recommendation: According to the protocol, Patient should Call 911. Advised Mother to Call 911. Care advice given. Mother verbalizes understanding and agrees with plan of care.     Laine Arambula RN Nursing Advisor 12/29/2023 9:49 PM     Reason for Disposition   Sounds like a life-threatening emergency to the triager    Additional Information   Negative: SEVERE difficulty breathing (e.g., struggling for each breath, speaks in single words)   Negative: Bluish (or gray) lips or face now   Negative: Choking on something   Negative: [1] Breathing stopped AND [2] hasn't returned   Negative: Difficult to awaken or acting confused (e.g., disoriented, slurred speech)   Negative: Stridor (harsh sound while breathing in)    Protocols used: Breathing Difficulty-A-AH
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
## 2023-12-30 NOTE — ED NOTES
Handoff report received from Diya Berg RN attempted Levophed free trial. Pt BP immediatly dropped below defined limits (see flowsheets). Levophed restated (see MAR).

## 2023-12-30 NOTE — ED NOTES
[facility]  ED Nurse Handoff Report    ED Chief complaint: Blood Infection and Respiratory Distress      ED Diagnosis:   Final diagnoses:   Septic shock (H)   Multifocal pneumonia   ALMAS (acute kidney injury) (H24)       Code Status: for hospitsalist to address    Allergies:   Allergies   Allergen Reactions    Valproic Acid Other (See Comments)     Toxicity w/ bone marrow suspension, elevated ammonia levels     Dilantin [Phenytoin Sodium] Other (See Comments)     Severe Trembling    Scopolamine Hives     Hives with the patch - oral no problem       Patient Story: Brought in by ambulance from home with mom for concerns of difficulty breathing and rapid respiratory rate today. Hx. of traumatic brain injury causing right-sided spastic, plegia, nonverbal, seizure disorder, hypothyroidism, panhypopituitarism, recurrent sepsis admission and prior cardiac arrest.    Focused Assessment:  bed bound, awake but non verbal. Skin dry, hot to touch, redness noted on buttocks and scratch marks in lower back. Contracted, spastic limbs. Respirations fast and labored at arrival with a productive cough. Tachycardic and hypotensive.   Results for orders placed or performed during the hospital encounter of 12/29/23   XR Chest Port 1 View     Status: None    Narrative    EXAM: XR CHEST PORT 1 VIEW  LOCATION: [facility]  DATE: 12/29/2023    INDICATION: respiratory distress  COMPARISON: 12/17/2023      Impression    IMPRESSION: No consolidation, pleural effusion or pneumothorax. Chronic coarsening of pulmonary interstitium is nonspecific but unchanged from prior study. Heart size is normal without pulmonary vascular congestion.   CT Chest w Contrast     Status: None    Narrative    EXAM: CT CHEST W CONTRAST  LOCATION: [facility]  DATE: 12/30/2023    INDICATION: eval for pna, sob, fever; respiratory distress, tachypnea, difficulty breathing  COMPARISON: Chest x-ray dated  12/29/2023 , CT chest dated 10/21/2023  TECHNIQUE: CT chest with IV contrast. Multiplanar reformats were obtained. Dose reduction techniques were used. Exam limited by motion artifact and arm down positioning.    CONTRAST: 81  ml Isovue 370    FINDINGS:   LUNGS AND PLEURA: Right lower lobe consolidation compatible with pneumonia or aspiration. Nodular airspace disease in the left lower lobe and dependent portions of the right upper lobe. Multifocal bronchial wall thickening including notable wall   thickening in the bronchus intermedius and right lower lobe bronchi. This has significantly worsened in the interval. Moderate retained secretions in the trachea. Subglottic tracheal stenosis redemonstrated.    MEDIASTINUM/AXILLAE: Multifocal mediastinal and hilar adenopathy. Representative subcarinal node measures 2.3 x 1.67 m, image 68. Right hilar adenopathy measures 2.4 x 2.0 cm, image 68. Additional enlarged right paratracheal, prevascular, left hilar, and   azygoesophageal recess nodes are present.    CORONARY ARTERY CALCIFICATION: None.    UPPER ABDOMEN: Partially visualized enterostomy tube in the stomach and jejunum. Cystic lesion in the pancreatic tail measures 2.6 cm, unchanged.    MUSCULOSKELETAL: No acute bony abnormalities.      Impression    IMPRESSION:   1.  Multifocal airspace disease compatible with pneumonia/aspiration, greatest in the right lower lobe.  2.  Marked associated bronchial wall thickening, particularly in the right lower lobe.  3.  Mediastinal and hilar adenopathy may be reactive in the setting of infection, however, follow-up posttherapy is recommended to document resolution.  4.  Additional findings as above.   Comprehensive metabolic panel     Status: Abnormal   Result Value Ref Range    Sodium 136 135 - 145 mmol/L    Potassium 3.9 3.4 - 5.3 mmol/L    Carbon Dioxide (CO2) 29 22 - 29 mmol/L    Anion Gap 12 7 - 15 mmol/L    Urea Nitrogen 28.6 (H) 8.0 - 23.0 mg/dL    Creatinine 1.42 (H)  0.67 - 1.17 mg/dL    GFR Estimate 56 (L) >60 mL/min/1.73m2    Calcium 8.5 (L) 8.8 - 10.2 mg/dL    Chloride 95 (L) 98 - 107 mmol/L    Glucose 271 (H) 70 - 99 mg/dL    Alkaline Phosphatase 87 40 - 150 U/L    AST 46 (H) 0 - 45 U/L    ALT 27 0 - 70 U/L    Protein Total 7.4 6.4 - 8.3 g/dL    Albumin 3.3 (L) 3.5 - 5.2 g/dL    Bilirubin Total 0.3 <=1.2 mg/dL   [place] Draw     Status: None    Narrative    The following orders were created for panel order [place] Draw.  Procedure                               Abnormality         Status                     ---------                               -----------         ------                     Extra Blue Top Tube[[id number]]                              Final result               Extra Red Top Tube[[id number]]                               Final result                 Please view results for these tests on the individual orders.   Symptomatic COVID-19 Virus (Coronavirus) by PCR Nasopharyngeal     Status: Abnormal    Specimen: Nasopharyngeal; Swab   Result Value Ref Range    SARS CoV2 PCR Positive (A) Negative    Narrative    Testing was performed using the Xpert Xpress SARS-CoV-2 Assay on the Cepheid Gene-Xpert Instrument Systems. Additional information about this Emergency Use Authorization (EUA) assay can be found via the Lab Guide. This test should be ordered for the detection of SARS-CoV-2 in individuals who meet SARS-CoV-2 clinical and/or epidemiological criteria as well as from individuals without symptoms or other reasons to suspect COVID-19. Test performance for asymptomatic patients has only been established in anterior nasal swab specimens. This test is for in vitro diagnostic use under the FDA EUA for laboratories certified under CLIA to perform high complexity testing. This test has not been FDA cleared or approved. A negative result does not rule out the presence of PCR inhibitors in the specimen or target RNA concentration below the limit of detection for the  assay. The possibility of a false negative should be considered if the patient's recent exposure or clinical presentation suggests COVID-19. This test was validated by the [facility]. This laboratory is certified under the Clinical Laboratory Improvement Amendments (CLIA) as qualified to perform high complexity laboratory testing.     CBC with platelets and differential     Status: Abnormal   Result Value Ref Range    WBC Count 14.2 (H) 4.0 - 11.0 10e3/uL    RBC Count 4.48 4.40 - 5.90 10e6/uL    Hemoglobin 13.3 13.3 - 17.7 g/dL    Hematocrit 40.8 40.0 - 53.0 %    MCV 91 78 - 100 fL    MCH 29.7 26.5 - 33.0 pg    MCHC 32.6 31.5 - 36.5 g/dL    RDW 13.9 10.0 - 15.0 %    Platelet Count 361 150 - 450 10e3/uL    % Neutrophils 73 %    % Lymphocytes 15 %    % Monocytes 9 %    % Eosinophils 2 %    % Basophils 1 %    % Immature Granulocytes 0 %    NRBCs per 100 WBC 0 <1 /100    Absolute Neutrophils 10.3 (H) 1.6 - 8.3 10e3/uL    Absolute Lymphocytes 2.2 0.8 - 5.3 10e3/uL    Absolute Monocytes 1.2 0.0 - 1.3 10e3/uL    Absolute Eosinophils 0.3 0.0 - 0.7 10e3/uL    Absolute Basophils 0.1 0.0 - 0.2 10e3/uL    Absolute Immature Granulocytes 0.1 <=0.4 10e3/uL    Absolute NRBCs 0.0 10e3/uL   Extra Blue Top Tube     Status: None   Result Value Ref Range    Hold Specimen JIC    Extra Red Top Tube     Status: None   Result Value Ref Range    Hold Specimen JIC    UA with Microscopic reflex to Culture     Status: Abnormal    Specimen: Urine, Catheter   Result Value Ref Range    Color Urine Yellow Colorless, Straw, Light Yellow, Yellow    Appearance Urine Clear Clear    Glucose Urine Negative Negative mg/dL    Bilirubin Urine Negative Negative    Ketones Urine Negative Negative mg/dL    Specific Gravity Urine 1.033 1.003 - 1.035    Blood Urine Negative Negative    pH Urine 5.5 5.0 - 7.0    Protein Albumin Urine 30 (A) Negative mg/dL    Urobilinogen Urine Normal Normal, 2.0 mg/dL    Nitrite Urine Negative  Negative    Leukocyte Esterase Urine Small (A) Negative    Mucus Urine Present (A) None Seen /LPF    RBC Urine 1 <=2 /HPF    WBC Urine 17 (H) <=5 /HPF    Squamous Epithelials Urine <1 <=1 /HPF    Hyaline Casts Urine 1 <=2 /LPF    Narrative    Urine Culture ordered based on laboratory criteria   iStat Gases (lactate) venous, POCT     Status: Abnormal   Result Value Ref Range    Lactic Acid POCT 2.3 (H) <=2.0 mmol/L    Bicarbonate Venous POCT 30 (H) 21 - 28 mmol/L    O2 Sat, Venous POCT 52 (L) 94 - 100 %    pCO2 Venous POCT 46 40 - 50 mm Hg    pH Venous POCT 7.42 7.32 - 7.43    pO2 Venous POCT 27 25 - 47 mm Hg   Procalcitonin     Status: Normal   Result Value Ref Range    Procalcitonin 0.35 <0.50 ng/mL   Lactic acid whole blood     Status: Normal   Result Value Ref Range    Lactic Acid 1.5 0.7 - 2.0 mmol/L   CBC with platelets + differential     Status: Abnormal    Narrative    The following orders were created for panel order CBC with platelets + differential.  Procedure                               Abnormality         Status                     ---------                               -----------         ------                     CBC with platelets and d...[[id number]]  Abnormal            Final result                 Please view results for these tests on the individual orders.       Treatments and/or interventions provided:   Medications   norepinephrine (LEVOPHED) 4 mg in  mL PERIPHERAL infusion (0.03 mcg/kg/min × 72.6 kg Intravenous Rate/Dose Change 12/30/23 0330)   carBAMazepine (TEGretol) suspension 150 mg (has no administration in time range)   lactated ringers BOLUS 1,000 mL (0 mLs Intravenous Stopped 12/30/23 0119)   acetaminophen (TYLENOL) Suppository 650 mg (650 mg Rectal $Given 12/29/23 2250)   levofloxacin (LEVAQUIN) infusion 750 mg (0 mg Intravenous Stopped 12/30/23 0119)   vancomycin (VANCOCIN) 1,750 mg in 0.9% NaCl 500 mL intermittent infusion (1,750 mg Intravenous $Given 12/29/23 0088)    lactated ringers BOLUS 1,000 mL (0 mLs Intravenous Stopped 12/30/23 0347)   hydrocortisone sodium succinate PF (solu-CORTEF) injection 100 mg (100 mg Intravenous $Given 12/30/23 0101)   iopamidol (ISOVUE-370) solution 81 mL (81 mLs Intravenous $Given 12/30/23 0137)   Saline Flush (65 mLs Intravenous $Given 12/30/23 0137)   lactated ringers BOLUS 1,000 mL (1,000 mLs Intravenous $New Bag 12/30/23 0243)     Patient's response to treatments and/or interventions: BP improving, comfortably resting in stretcher.     To be done/followed up on inpatient unit:  continue with POC.    Does this patient have any cognitive concerns?: Hx Head Trauma    Activity level - Baseline/Home:  Total Care  Activity Level - Current:   Total Care    Patient's Preferred language: English   Needed?: No    Isolation: Contact  and COVID r/o and special precautions  Infection: MRSA  VRE  COVID r/o and special precautions  Patient tested for COVID 19 prior to admission: YES  Bariatric?: No    Vital Signs:   Vitals:    12/30/23 0315 12/30/23 0320 12/30/23 0325 12/30/23 0330   BP: 100/63 111/64 123/58 127/66   Pulse: 100 98 101 99   Resp: 12 12 12 20   Temp:       TempSrc:       SpO2: 95% 98% 96% 96%   Weight:           Cardiac Rhythm:     Was the PSS-3 completed:   Yes  What interventions are required if any?    Family Comments: mother at bedside  OBS brochure/video discussed/provided to patient/family: N/A  For the majority of the shift this patient's behavior was Green.   Behavioral interventions performed were n/a.    ED NURSE PHONE NUMBER: [phone]

## 2023-12-30 NOTE — CONSULTS
CLINICAL NUTRITION SERVICES  -  ASSESSMENT NOTE      Recommendations Ordered by Registered Dietitian (RD):     Nutrition Support Enteral:  Type of Feeding Tube: GJ tube (feed into J-port)  Enteral Frequency:  Continuous  Enteral Regimen: Jevity 1.5 @ 55 mL/hr x 22 hrs (holding for Levothyroxine dose) + 1 bottle Expedite daily  (for wound healing)  Total Enteral Provisions: 1915 cals (26 cals/kg), 87 gm pro (1.2 gm/kg), 25 gm fiber, 920 mL free water  Free Water Flush: 60 mL every 4 hrs     Resume TF at 15 mL/hr.  Increase by 20 mL every 24 hrs to goal rate of 55 mL/hr (slow advancement with pt on pressor)       Malnutrition:   % Weight Loss:  None noted  % Intake:  No decreased intake noted - wt does fluctuate some  Subcutaneous Fat Loss:  (12/18 NFPA)) None observed   Muscle Loss:  (12/18 NFPA)) None observed   Fluid Retention:  None noted    Malnutrition Diagnosis: Patient does not meet two of the above criteria necessary for diagnosing malnutrition        REASON FOR ASSESSMENT  Keyon Farias is a 63 year old male assessed by Registered Dietitian for Provider Order - Registered Dietitian to Assess and Order TF per Medical Nutrition Therapy Protocol      NUTRITION HISTORY  Pt very well known to Nutrition Services  Recently d/c'd from [facility] 12/21  - At home, pt's mother provides ~5.5 cartons of Jevity 1.5 daily. Provides 1304 mL, 1953 kcal, 83 g protein, 281 g CHO, 27 g fiber daily.   - During hospitalizations, we typically order a cyclic TF schedule to run over 22 hours, holding for 1 hour prior and post Levothyroxine dosing.         CURRENT NUTRITION ORDERS  Diet Order:     NPO      NUTRITION FOCUSED PHYSICAL ASSESSMENT FOR DIAGNOSING MALNUTRITION)    No:  Deferred - pt currently boarding in ED      12/18/23: NFPA completed by RD          Obtained from Chart/Interdisciplinary Team:  GJ tube  Pt presenting with septic shock in setting of aspiration event   Patient has large, open red area of skin to sacral/coccyx  "area.     ANTHROPOMETRICS  Height: 5'10\"  Weight:(12/29) 72.6 kg / 160 lbs .86 oz  Body mass index is 22.97 kg/m .  Weight Status:  Normal BMI  IBW: 75.5 kg  % IBW: 96%  Weight History: Wt has remained relatively stable (suspect wts from 10/3 and 10/4 were outliers)  Wt Readings from Last 10 Encounters:   12/29/23 72.6 kg (160 lb 0.9 oz)   12/21/23 75.3 kg (166 lb 0.1 oz)   12/01/23 69.8 kg (153 lb 14.1 oz)   11/26/23 74.8 kg (165 lb)   11/07/23 74.8 kg (165 lb)   10/26/23 70.8 kg (156 lb 1.4 oz)   10/04/23 83.9 kg (185 lb)   10/03/23 84.2 kg (185 lb 10 oz)   09/21/23 77.1 kg (170 lb)   09/12/23 76.8 kg (169 lb 5 oz)     09/28/23 0500 75.3 kg (166 lb 0.1 oz) Bed scale   09/27/23 0400 72.2 kg (159 lb 2.8 oz) Bed scale   09/26/23 0600 70.4 kg (155 lb 3.3 oz) Bed scale       LABS  12/30: Na 140             K 4.0    MEDICATIONS  Levothyroxine once daily (hold TF 1 hr before and 1 hr after dose)  1000 mg Vitamin C  50 mcg Vitamin D3  IVF @ 100 mL/hr  Norepinephrine       ASSESSED NUTRITION NEEDS PER APPROVED PRACTICE GUIDELINES:    Dosing Weight 72.6 kg  Estimated Energy Needs: 8594-4281 kcals (25-30 Kcal/Kg)  Justification: maintenance  Estimated Protein Needs:  grams protein (1.2-1.5 g pro/Kg)  Justification: maintenance  Estimated Fluid Needs: 6396-8038  mL (1 mL/Kcal)  Justification: maintenance    MALNUTRITION:  % Weight Loss:  None noted  % Intake:  No decreased intake noted - wt does fluctuate some  Subcutaneous Fat Loss:  (12/18 NFPA)) None observed   Muscle Loss:  (12/18 NFPA)) None observed   Fluid Retention:  None noted    Malnutrition Diagnosis: Patient does not meet two of the above criteria necessary for diagnosing malnutrition      NUTRITION DIAGNOSIS:  Inadequate protein-energy intake related to NPO status and EN has not resumed as evidenced by pt not meeting nutrition needs      NUTRITION INTERVENTIONS  Recommendations / Nutrition Prescription  NPO      Nutrition Support Enteral:  Type of Feeding " Tube: GJ tube (feed into J-port)  Enteral Frequency:  Continuous  Enteral Regimen: Jevity 1.5 @ 55 mL/hr x 22 hrs (holding for Levothyroxine dose) + 1 bottle Expedite daily (for wound healing)  Total Enteral Provisions: 1915 cals (26 cals/kg), 87 gm pro (1.2 gm/kg), 25 gm fiber, 920 mL free water  Free Water Flush: 60 mL every 4 hrs     Resume TF at 15 mL/hr.  Increase by 20 mL every 24 hrs to goal rate of 55 mL/hr (slow advancement with pt on pressor)  .      Implementation  Nutrition education: Not appropriate at this time due to patient condition  EN Composition and EN Schedule: orders completed in EPIC  .      Nutrition Goals  EN to meet % estimated needs  .      MONITORING AND EVALUATION:  Progress towards goals will be monitored and evaluated per protocol and Practice Guidelines

## 2023-12-30 NOTE — PROGRESS NOTES
Brief Tele ICU Note    Chart and notes reviewed.  Pt presenting with septic shock in setting of aspiration event (similar presentations in past) requiring initiation of vasopressors.   MAP~ 81 on levophed 0.2.  Noted COVID positive still (hospitalization in Oct 23) , repeat testing end of Nov and now end of Dec continue to be positive.  Currently on 4 L nasal cannula for support.     A; added sputum and resp viral panel (full)   P: continue wean vasopressors (MAP goal >65)     Tele ICU continue to follow during placement, Call for questions.     Jhonatan Ellis MD

## 2023-12-30 NOTE — ED PROVIDER NOTES
History     Chief Complaint:  Blood Infection and Respiratory Distress       HPI   Keyon Farias is a 63 year old male history of traumatic brain injury causing right-sided spastic, plegia, nonverbal, seizure disorder, hypothyroidism, panhypopituitarism, recurrent sepsis admission and prior cardiac arrest, presenting from home with mom for concerns of difficulty breathing and rapid respiratory rate today.      Independent Historian:    EMS provides history.  Mother later states that patient vomited a couple days ago and she thinks he aspirated.    Review of External Notes:  Discharge summary 21 December 2023 for presumed aspiration pneumonitis.     Allergies:  Valproic Acid  Dilantin [Phenytoin Sodium]  Scopolamine     Physical Exam   Patient Vitals for the past 24 hrs:   BP Temp Temp src Pulse Resp SpO2 Weight   12/30/23 0300 (!) 84/47 -- -- 99 (!) 7 98 % --   12/30/23 0200 (!) 84/43 -- -- 106 12 96 % --   12/30/23 0115 95/53 -- -- 107 19 100 % --   12/30/23 0104 107/53 -- -- 107 17 100 % --   12/30/23 0049 101/55 -- -- 110 23 99 % --   12/30/23 0026 91/57 -- -- 112 -- 98 % --   12/29/23 2356 90/59 -- -- 112 20 98 % --   12/29/23 2346 (!) 87/54 -- -- 113 30 -- --   12/29/23 2330 95/56 -- -- 117 (!) 37 95 % --   12/29/23 2315 102/64 -- -- (!) 121 (!) 35 94 % --   12/29/23 2310 100/63 -- -- (!) 121 (!) 39 96 % --   12/29/23 2300 (!) 85/62 -- -- 120 (!) 38 96 % --   12/29/23 2255 93/57 -- -- 118 (!) 39 93 % --   12/29/23 2238 -- -- -- -- -- 92 % --   12/29/23 2236 -- -- -- -- -- (!) 88 % --   12/29/23 2234 101/64 99.2  F (37.3  C) Rectal (!) 127 (!) 34 90 % --   12/29/23 2233 -- -- -- -- -- -- 72.6 kg (160 lb 0.9 oz)        Physical Exam  GENERAL: Patient appears disheveled, tachypneic, slouched to the side.  Nonverbal.  Mouth agape.  HEAD: Atraumatic.  NECK: No rigidity  CV: Tachycardic and regular, no murmurs rubs or gallops  PULM: Tachypnea, slight accessory muscle use.  Diminished breath sounds bilaterally.  ABD:  Soft, nontender, nondistended, no guarding.  G-tube in place draining greenish, brownish fluid.    DERM: Excoriations and blanching erythema over sacrum.  No pus. Skin warm and dry  EXTREMITY: Contractures in extremities.    VASCULAR: Symmetric pulses bilaterally      Emergency Department Course            Laboratory: Imaging:   Labs Ordered and Resulted from Time of ED Arrival to Time of ED Departure   COMPREHENSIVE METABOLIC PANEL - Abnormal       Result Value    Sodium 136      Potassium 3.9      Carbon Dioxide (CO2) 29      Anion Gap 12      Urea Nitrogen 28.6 (*)     Creatinine 1.42 (*)     GFR Estimate 56 (*)     Calcium 8.5 (*)     Chloride 95 (*)     Glucose 271 (*)     Alkaline Phosphatase 87      AST 46 (*)     ALT 27      Protein Total 7.4      Albumin 3.3 (*)     Bilirubin Total 0.3     COVID-19 VIRUS (CORONAVIRUS) BY PCR - Abnormal    SARS CoV2 PCR Positive (*)    CBC WITH PLATELETS AND DIFFERENTIAL - Abnormal    WBC Count 14.2 (*)     RBC Count 4.48      Hemoglobin 13.3      Hematocrit 40.8      MCV 91      MCH 29.7      MCHC 32.6      RDW 13.9      Platelet Count 361      % Neutrophils 73      % Lymphocytes 15      % Monocytes 9      % Eosinophils 2      % Basophils 1      % Immature Granulocytes 0      NRBCs per 100 WBC 0      Absolute Neutrophils 10.3 (*)     Absolute Lymphocytes 2.2      Absolute Monocytes 1.2      Absolute Eosinophils 0.3      Absolute Basophils 0.1      Absolute Immature Granulocytes 0.1      Absolute NRBCs 0.0     ROUTINE UA WITH MICROSCOPIC REFLEX TO CULTURE - Abnormal    Color Urine Yellow      Appearance Urine Clear      Glucose Urine Negative      Bilirubin Urine Negative      Ketones Urine Negative      Specific Gravity Urine 1.033      Blood Urine Negative      pH Urine 5.5      Protein Albumin Urine 30 (*)     Urobilinogen Urine Normal      Nitrite Urine Negative      Leukocyte Esterase Urine Small (*)     Mucus Urine Present (*)     RBC Urine 1      WBC Urine 17 (*)      Squamous Epithelials Urine <1      Hyaline Casts Urine 1     ISTAT GASES LACTATE VENOUS POCT - Abnormal    Lactic Acid POCT 2.3 (*)     Bicarbonate Venous POCT 30 (*)     O2 Sat, Venous POCT 52 (*)     pCO2 Venous POCT 46      pH Venous POCT 7.42      pO2 Venous POCT 27     PROCALCITONIN - Normal    Procalcitonin 0.35     LACTIC ACID WHOLE BLOOD - Normal    Lactic Acid 1.5     LACTIC ACID WHOLE BLOOD   BLOOD CULTURE   BLOOD CULTURE   URINE CULTURE     CT Chest w Contrast   Final Result   IMPRESSION:    1.  Multifocal airspace disease compatible with pneumonia/aspiration, greatest in the right lower lobe.   2.  Marked associated bronchial wall thickening, particularly in the right lower lobe.   3.  Mediastinal and hilar adenopathy may be reactive in the setting of infection, however, follow-up posttherapy is recommended to document resolution.   4.  Additional findings as above.      XR Chest Port 1 View   Final Result   IMPRESSION: No consolidation, pleural effusion or pneumothorax. Chronic coarsening of pulmonary interstitium is nonspecific but unchanged from prior study. Heart size is normal without pulmonary vascular congestion.              Procedures     Central Line Placement     Procedure:  Central Venous Catheter Insertion     Indication: Vasopressor administration    Consent:  Written from Family  Risk Discussed: Arterial puncture & stroke, incorrect placement, failure to place, bleeding or infection, nerve damage, pneumothorax, and alternative treatment with no central line    Universal Protocol: Universal protocol was followed and time out conducted just prior to starting procedure, confirming patient identity, site/side, procedure, patient position, and availability of correct equipment and implants.     Anesthesia/Sedation: local anesthesia    Procedure Detail:    Central line bundle elements were complete including preparatory hand leansing, donning full barrier precautions, use of a full body drape  and chlorhxidine gluconate skin prep.   The Right internal femoral vein was selected as the optimal location for patient s condition.   Ultrasound was utilized for guidance: Yes, see separate procedural guidance POCUS note   A finder needle was used to enter the vein, through which a guidewire was inserted. The finder needle was then removed and the tract was dilated.   A triple lumen central venous catheter was inserted over the guidewire without difficulty. The guidewire was removed and the catheter was sutured in place and covered with an appropriate dressing.        Patient Status:  The patient tolerated the procedure well: Yes. There were no complications.    Vascular Access Ultrasound     Procedure Name: POC Ultrasound for Vascular Access    Indication: Central line placement    Views: Transverse    Findings:  femoral right (vessel name) visualized     Impression: Successful vascular access obtained without evidence of complication.    Study performed by: Yahir Salgado MD     Images archived: Yes      Emergency Department Course & Assessments:             Interventions:  Medications   norepinephrine (LEVOPHED) 4 mg in  mL PERIPHERAL infusion (0.06 mcg/kg/min × 72.6 kg Intravenous Rate/Dose Change 12/30/23 0221)   lactated ringers BOLUS 1,000 mL (0 mLs Intravenous Stopped 12/30/23 0119)   acetaminophen (TYLENOL) Suppository 650 mg (650 mg Rectal $Given 12/29/23 2250)   levofloxacin (LEVAQUIN) infusion 750 mg (0 mg Intravenous Stopped 12/30/23 0119)   vancomycin (VANCOCIN) 1,750 mg in 0.9% NaCl 500 mL intermittent infusion (1,750 mg Intravenous $Given 12/29/23 2348)   lactated ringers BOLUS 1,000 mL (1,000 mLs Intravenous $New Bag 12/30/23 0028)   hydrocortisone sodium succinate PF (solu-CORTEF) injection 100 mg (100 mg Intravenous $Given 12/30/23 0101)   iopamidol (ISOVUE-370) solution 81 mL (81 mLs Intravenous $Given 12/30/23 0137)   Saline Flush (65 mLs Intravenous $Given 12/30/23 0137)   lactated ringers  BOLUS 1,000 mL (1,000 mLs Intravenous $New Bag 12/30/23 0243)        Assessments, Independent Interpretation, Consult/Discussion of ManagementTests:   Discussed with hospitalist Dr. Maldonado regarding admission.  Chest x-ray-no focal consolidation.  Discussed with pharmacy regarding antibiotic choice.  Social Determinants of Health affecting care:      Disposition:  To be transferred    Impression & Plan    CMS Diagnoses: The patient has signs of Septic Shock  The patient has signs of septic shock as evidenced by:  1. Presence of Sepsis, AND  2. Persistent hypotension defined by the last 2 BP readings within the ONE HOUR following completion of the 30mL/kg bolus being low (SBP <90 or MAP <65)    Time septic shock diagnosis confirmed = 2250 12/30/23   as this was the time when Provider determined that septic shock was present    3 Hour Septic Shock Bundle Completion:  1. Initial Lactic Acid Result:   Recent Labs   Lab Test 12/30/23  0220 12/29/23  2242 12/19/23  1151   LACT 1.5 2.3* 1.2     2. Blood Cultures before Antibiotics: Yes  3. Broad Spectrum Antibiotics Administered:  yes       Anti-infectives (From admission through now)      Start     Dose/Rate Route Frequency Ordered Stop    12/30/23 0000  vancomycin (VANCOCIN) 1,750 mg in 0.9% NaCl 500 mL intermittent infusion         1,750 mg  over 2 Hours Intravenous ONCE 12/29/23 2331 12/30/23 0148    12/29/23 2250  levofloxacin (LEVAQUIN) infusion 750 mg         750 mg  100 mL/hr over 90 Minutes Intravenous ONCE 12/29/23 2249 12/30/23 0119            4. IF 30 mL/kg bolus criteria met based on:  -Lactate > 4  OR  -Initial Hypotension:  Definition:  2 low BP readings (SBP <90, MAP <65, or decrease > 40 from baseline due to infection) within 3 hrs of each other during the time period of 6 hrs before and 3 hrs  after time zero  THEN: Fluid volume administered in ED:  Full 30 mL/kg bolus given (see amount below).    BMI Readings from Last 1 Encounters:   12/29/23 22.97  kg/m      30 mL/kg fluids based on weight: 2,180 mL  30 mL/kg fluids based on IBW (must be >= 60 inches tall): 2,190 mL    Septic Shock reassessment:  1. Repeat Lactic Acid Level: 1.5  2. Vasopressors started for Persistent Hypotension defined by the last 2 BP readings within the ONE HOUR following completion of the 30mL/kg bolus being low (SBP <90 or MAP <65).              and None    Code Status: Prior    Medical Decision Making:  Symptoms consistent with sepsis with likely source multifocal pneumonia versus aspiration.    Promptly obtained IV access and placed on monitors.      Chronic conditions complicating -major TBI, multiple prior episodes of sepsis.    DDx considered included, but not limited to PE, ACS, however evaluation not consistent with these etiologies.     CXR independently interpreted unremarkable.     Obtained labs, blood cultures + lactate.     Labs significant for leukocytosis, ALMAS, and slight elevation of lactate 2.3.  Repeat 1.5.    Given IVF and empiric IV broad spectrum antibiotics.     Patient was hypotensive despite 30 cc/kg IV fluids therefore was started on norepinephrine peripherally.  Central line was placed and pressors were continued with improvement in blood pressure.    Patient given 100 mg IV hydrocortisone due to being on chronic steroids and panhypopituitarism.    Discussed with hospitalist for consultation at [facility].    Plan is for admission to the ICU, but we have no ICU beds here so looking outside the system for beds.  Patient's mother made aware of plan.    Wrote patient for his home carbamazepine.    Turned over to Dr. Frazier pending ICU bed availability.    Full code patient.    Critical Care  Critical Care is defined as an illness or injury that acutely impairs one or more vital organ systems such that there is a high probability of imminent or life-threatening deterioration in the patient's condition and that the failure to initiate these interventions on an urgent  basis would likely result in sudden, clinically significant or life-threatening deterioration in the patient's condition.    The critical condition was: Sepsis, severe or septic shock    Critical interventions performed or strongly considered: Norepinephrine infusion.    Critical care time was 30 minutes exclusive of time spent on separately billable procedures.    For purposes of time:  Procedures included in CC time: interpretation of NICOM, CXR, SpO2, VBG/ABG, interpretation of physiologic data, OG placement, temporary transcutaneous/transvenous pacing, ventilator management  Common separately billable procedures (not included in CC time): CPR, wound repair, endotracheal intubation, central line placement, intraosseous placement, tube thoracostomy, temporary transvenous pacemaker, EKG, electrical cardioversion      Diagnosis:    ICD-10-CM    1. Septic shock (H)  A41.9     R65.21       2. Multifocal pneumonia  J18.9       3. ALMAS (acute kidney injury) (H24)  N17.9            Discharge Medications:  New Prescriptions    No medications on file          12/29/2023   Yahir Salgado MD Foss, Kevin, MD  12/30/23 0336

## 2023-12-30 NOTE — ED NOTES
Patient side lying resting with eyes closed. Respirations even and unlabored- equal chest rise and fall. VSS at this time.

## 2023-12-30 NOTE — PHARMACY-VANCOMYCIN DOSING SERVICE
Pharmacy Vancomycin Initial Note  Date of Service 2023  Patient's  1960  63 year old, male    Indication: [facility] Pneumonia    Current estimated CrCl = Estimated Creatinine Clearance: 76.6 mL/min (based on SCr of 1.04 mg/dL).    Creatinine for last 3 days  2023: 10:39 PM Creatinine 1.42 mg/dL  2023:  7:49 AM Creatinine 1.04 mg/dL    Recent Vancomycin Level(s) for last 3 days  No results found for requested labs within last 3 days.      Vancomycin IV Administrations (past 72 hours)                     vancomycin (VANCOCIN) 1,750 mg in 0.9% NaCl 500 mL intermittent infusion (mg) 1,750 mg Given 23 2348                    Nephrotoxins and other renal medications (From now, onward)      Start     Dose/Rate Route Frequency Ordered Stop    23 0845  norepinephrine (LEVOPHED) 4 mg in  mL infusion PREMIX         0.01-0.6 mcg/kg/min × 72.6 kg  2.7-163.4 mL/hr  Intravenous CONTINUOUS 23 0842              Contrast Orders - past 72 hours (72h ago, onward)      Start     Dose/Rate Route Frequency Stop    23 0110  iopamidol (ISOVUE-370) solution 81 mL         81 mL Intravenous ONCE 23 0137            [employer] Prediction of Planned Initial Vancomycin Regimen  Loading dose: 1750mg IV x1 in ED  Regimen: 750 mg IV every 12 hours.  Start time: 11:48 on 2023  Exposure target: AUC24 (range)400-600 mg/L.hr   AUC24,ss: 472 mg/L.hr  Probability of AUC24 > 400: 68 %  Ctrough,ss: 15.5 mg/L  Probability of Ctrough,ss > 20: 28 %  Probability of nephrotoxicity (Lodise ERNESTO ): 11 %        Plan:  Start vancomycin  750 mg IV q12h.   Vancomycin monitoring method: AUC  Vancomycin therapeutic monitoring goal: 400-600 mg*h/L  Pharmacy will check vancomycin levels as appropriate in 1-3 Days.    Serum creatinine levels will be ordered daily for the first week of therapy and at least twice weekly for subsequent weeks.      Louise Pereira, [facility]

## 2023-12-31 LAB
ALBUMIN SERPL BCG-MCNC: 2.8 G/DL (ref 3.5–5.2)
ALP SERPL-CCNC: 63 U/L (ref 40–150)
ALT SERPL W P-5'-P-CCNC: 43 U/L (ref 0–70)
ANION GAP SERPL CALCULATED.3IONS-SCNC: 13 MMOL/L (ref 7–15)
AST SERPL W P-5'-P-CCNC: 81 U/L (ref 0–45)
BASE EXCESS BLDV CALC-SCNC: 0.5 MMOL/L (ref -7.7–1.9)
BASE EXCESS BLDV CALC-SCNC: 3.2 MMOL/L (ref -7.7–1.9)
BASOPHILS # BLD AUTO: 0 10E3/UL (ref 0–0.2)
BASOPHILS NFR BLD AUTO: 0 %
BILIRUB SERPL-MCNC: 0.2 MG/DL
BUN SERPL-MCNC: 14.8 MG/DL (ref 8–23)
CALCIUM SERPL-MCNC: 8 MG/DL (ref 8.8–10.2)
CHLORIDE SERPL-SCNC: 110 MMOL/L (ref 98–107)
CREAT SERPL-MCNC: 0.76 MG/DL (ref 0.67–1.17)
DEPRECATED HCO3 PLAS-SCNC: 25 MMOL/L (ref 22–29)
EGFRCR SERPLBLD CKD-EPI 2021: >90 ML/MIN/1.73M2
EOSINOPHIL # BLD AUTO: 0 10E3/UL (ref 0–0.7)
EOSINOPHIL NFR BLD AUTO: 0 %
ERYTHROCYTE [DISTWIDTH] IN BLOOD BY AUTOMATED COUNT: 14 % (ref 10–15)
GLUCOSE BLDC GLUCOMTR-MCNC: 112 MG/DL (ref 70–99)
GLUCOSE BLDC GLUCOMTR-MCNC: 125 MG/DL (ref 70–99)
GLUCOSE BLDC GLUCOMTR-MCNC: 125 MG/DL (ref 70–99)
GLUCOSE BLDC GLUCOMTR-MCNC: 147 MG/DL (ref 70–99)
GLUCOSE BLDC GLUCOMTR-MCNC: 155 MG/DL (ref 70–99)
GLUCOSE BLDC GLUCOMTR-MCNC: 158 MG/DL (ref 70–99)
GLUCOSE SERPL-MCNC: 159 MG/DL (ref 70–99)
HCO3 BLDV-SCNC: 27 MMOL/L (ref 21–28)
HCO3 BLDV-SCNC: 28 MMOL/L (ref 21–28)
HCT VFR BLD AUTO: 31.7 % (ref 40–53)
HGB BLD-MCNC: 9.9 G/DL (ref 13.3–17.7)
IMM GRANULOCYTES # BLD: 0.1 10E3/UL
IMM GRANULOCYTES NFR BLD: 1 %
LACTATE SERPL-SCNC: 1.7 MMOL/L (ref 0.7–2)
LACTATE SERPL-SCNC: 3.4 MMOL/L (ref 0.7–2)
LACTATE SERPL-SCNC: 5.3 MMOL/L (ref 0.7–2)
LYMPHOCYTES # BLD AUTO: 1.3 10E3/UL (ref 0.8–5.3)
LYMPHOCYTES NFR BLD AUTO: 11 %
MCH RBC QN AUTO: 29.6 PG (ref 26.5–33)
MCHC RBC AUTO-ENTMCNC: 31.2 G/DL (ref 31.5–36.5)
MCV RBC AUTO: 95 FL (ref 78–100)
MONOCYTES # BLD AUTO: 0.4 10E3/UL (ref 0–1.3)
MONOCYTES NFR BLD AUTO: 3 %
NEUTROPHILS # BLD AUTO: 9.4 10E3/UL (ref 1.6–8.3)
NEUTROPHILS NFR BLD AUTO: 85 %
NRBC # BLD AUTO: 0 10E3/UL
NRBC BLD AUTO-RTO: 0 /100
O2/TOTAL GAS SETTING VFR VENT: 21 %
O2/TOTAL GAS SETTING VFR VENT: 21 %
OXYHGB MFR BLDV: 70 % (ref 70–75)
OXYHGB MFR BLDV: 79 % (ref 70–75)
PCO2 BLDV: 45 MM HG (ref 40–50)
PCO2 BLDV: 48 MM HG (ref 40–50)
PH BLDV: 7.35 [PH] (ref 7.32–7.43)
PH BLDV: 7.41 [PH] (ref 7.32–7.43)
PHOSPHATE SERPL-MCNC: 2.9 MG/DL (ref 2.5–4.5)
PLATELET # BLD AUTO: 258 10E3/UL (ref 150–450)
PO2 BLDV: 40 MM HG (ref 25–47)
PO2 BLDV: 44 MM HG (ref 25–47)
POTASSIUM SERPL-SCNC: 3.6 MMOL/L (ref 3.4–5.3)
PROCALCITONIN SERPL IA-MCNC: 0.09 NG/ML
PROT SERPL-MCNC: 6.2 G/DL (ref 6.4–8.3)
RBC # BLD AUTO: 3.35 10E6/UL (ref 4.4–5.9)
SODIUM SERPL-SCNC: 148 MMOL/L (ref 135–145)
TROPONIN T SERPL HS-MCNC: 20 NG/L
VANCOMYCIN SERPL-MCNC: 11.6 UG/ML
WBC # BLD AUTO: 11.1 10E3/UL (ref 4–11)

## 2023-12-31 PROCEDURE — 120N000001 HC R&B MED SURG/OB

## 2023-12-31 PROCEDURE — 99291 CRITICAL CARE FIRST HOUR: CPT | Performed by: INTERNAL MEDICINE

## 2023-12-31 PROCEDURE — 250N000011 HC RX IP 250 OP 636: Performed by: INTERNAL MEDICINE

## 2023-12-31 PROCEDURE — 99223 1ST HOSP IP/OBS HIGH 75: CPT | Mod: AI | Performed by: STUDENT IN AN ORGANIZED HEALTH CARE EDUCATION/TRAINING PROGRAM

## 2023-12-31 PROCEDURE — 258N000003 HC RX IP 258 OP 636: Performed by: STUDENT IN AN ORGANIZED HEALTH CARE EDUCATION/TRAINING PROGRAM

## 2023-12-31 PROCEDURE — 83605 ASSAY OF LACTIC ACID: CPT | Performed by: INTERNAL MEDICINE

## 2023-12-31 PROCEDURE — 84100 ASSAY OF PHOSPHORUS: CPT | Performed by: INTERNAL MEDICINE

## 2023-12-31 PROCEDURE — 258N000003 HC RX IP 258 OP 636: Performed by: INTERNAL MEDICINE

## 2023-12-31 PROCEDURE — 250N000011 HC RX IP 250 OP 636: Performed by: STUDENT IN AN ORGANIZED HEALTH CARE EDUCATION/TRAINING PROGRAM

## 2023-12-31 PROCEDURE — 250N000013 HC RX MED GY IP 250 OP 250 PS 637: Performed by: STUDENT IN AN ORGANIZED HEALTH CARE EDUCATION/TRAINING PROGRAM

## 2023-12-31 PROCEDURE — 80202 ASSAY OF VANCOMYCIN: CPT | Performed by: INTERNAL MEDICINE

## 2023-12-31 PROCEDURE — 250N000013 HC RX MED GY IP 250 OP 250 PS 637: Performed by: INTERNAL MEDICINE

## 2023-12-31 PROCEDURE — 250N000011 HC RX IP 250 OP 636: Mod: JZ | Performed by: STUDENT IN AN ORGANIZED HEALTH CARE EDUCATION/TRAINING PROGRAM

## 2023-12-31 PROCEDURE — 84484 ASSAY OF TROPONIN QUANT: CPT | Performed by: INTERNAL MEDICINE

## 2023-12-31 PROCEDURE — 82805 BLOOD GASES W/O2 SATURATION: CPT | Performed by: INTERNAL MEDICINE

## 2023-12-31 PROCEDURE — 84145 PROCALCITONIN (PCT): CPT | Performed by: INTERNAL MEDICINE

## 2023-12-31 PROCEDURE — 85025 COMPLETE CBC W/AUTO DIFF WBC: CPT | Performed by: INTERNAL MEDICINE

## 2023-12-31 PROCEDURE — 80053 COMPREHEN METABOLIC PANEL: CPT | Performed by: INTERNAL MEDICINE

## 2023-12-31 RX ORDER — DEXTROSE MONOHYDRATE 50 MG/ML
INJECTION, SOLUTION INTRAVENOUS CONTINUOUS
Status: ACTIVE | OUTPATIENT
Start: 2023-12-31 | End: 2024-01-01

## 2023-12-31 RX ORDER — VANCOMYCIN HYDROCHLORIDE 1 G/200ML
1000 INJECTION, SOLUTION INTRAVENOUS EVERY 12 HOURS
Status: DISCONTINUED | OUTPATIENT
Start: 2024-01-01 | End: 2024-01-03

## 2023-12-31 RX ADMIN — SODIUM CHLORIDE: 9 INJECTION, SOLUTION INTRAVENOUS at 11:58

## 2023-12-31 RX ADMIN — CARBAMAZEPINE 100 MG: 100 SUSPENSION ORAL at 17:41

## 2023-12-31 RX ADMIN — BRIVARACETAM 100 MG: 10 SOLUTION ORAL at 19:55

## 2023-12-31 RX ADMIN — Medication 40 MG: at 08:26

## 2023-12-31 RX ADMIN — Medication 60 ML: at 11:41

## 2023-12-31 RX ADMIN — HEPARIN SODIUM 5000 UNITS: 5000 INJECTION, SOLUTION INTRAVENOUS; SUBCUTANEOUS at 14:22

## 2023-12-31 RX ADMIN — Medication 50 MCG: at 08:26

## 2023-12-31 RX ADMIN — ACETAMINOPHEN 650 MG: 325 SUSPENSION ORAL at 01:52

## 2023-12-31 RX ADMIN — LEVOFLOXACIN 750 MG: 5 INJECTION, SOLUTION INTRAVENOUS at 22:19

## 2023-12-31 RX ADMIN — LEVOTHYROXINE SODIUM 125 MCG: 125 TABLET ORAL at 14:30

## 2023-12-31 RX ADMIN — HYDROCORTISONE SODIUM SUCCINATE 100 MG: 100 INJECTION, POWDER, FOR SOLUTION INTRAMUSCULAR; INTRAVENOUS at 19:54

## 2023-12-31 RX ADMIN — HYDROCORTISONE SODIUM SUCCINATE 100 MG: 100 INJECTION, POWDER, FOR SOLUTION INTRAMUSCULAR; INTRAVENOUS at 00:10

## 2023-12-31 RX ADMIN — POTASSIUM & SODIUM PHOSPHATES POWDER PACK 280-160-250 MG 2 PACKET: 280-160-250 PACK at 01:52

## 2023-12-31 RX ADMIN — FLUDROCORTISONE ACETATE 0.1 MG: 0.1 TABLET ORAL at 08:25

## 2023-12-31 RX ADMIN — VANCOMYCIN HYDROCHLORIDE 750 MG: 500 INJECTION, POWDER, LYOPHILIZED, FOR SOLUTION INTRAVENOUS at 00:10

## 2023-12-31 RX ADMIN — DEXTROSE MONOHYDRATE: 50 INJECTION, SOLUTION INTRAVENOUS at 17:36

## 2023-12-31 RX ADMIN — INSULIN ASPART 1 UNITS: 100 INJECTION, SOLUTION INTRAVENOUS; SUBCUTANEOUS at 04:45

## 2023-12-31 RX ADMIN — HEPARIN SODIUM 5000 UNITS: 5000 INJECTION, SOLUTION INTRAVENOUS; SUBCUTANEOUS at 22:20

## 2023-12-31 RX ADMIN — SODIUM CHLORIDE, POTASSIUM CHLORIDE, SODIUM LACTATE AND CALCIUM CHLORIDE 1000 ML: 600; 310; 30; 20 INJECTION, SOLUTION INTRAVENOUS at 06:06

## 2023-12-31 RX ADMIN — CYANOCOBALAMIN TAB 1000 MCG 2500 MCG: 1000 TAB at 08:25

## 2023-12-31 RX ADMIN — CARBAMAZEPINE 150 MG: 100 SUSPENSION ORAL at 11:58

## 2023-12-31 RX ADMIN — INSULIN ASPART 1 UNITS: 100 INJECTION, SOLUTION INTRAVENOUS; SUBCUTANEOUS at 11:59

## 2023-12-31 RX ADMIN — CARBAMAZEPINE 150 MG: 100 SUSPENSION ORAL at 06:06

## 2023-12-31 RX ADMIN — BRIVARACETAM 100 MG: 10 SOLUTION ORAL at 08:24

## 2023-12-31 RX ADMIN — OXYCODONE HYDROCHLORIDE AND ACETAMINOPHEN 1000 MG: 500 TABLET ORAL at 14:30

## 2023-12-31 RX ADMIN — HEPARIN SODIUM 5000 UNITS: 5000 INJECTION, SOLUTION INTRAVENOUS; SUBCUTANEOUS at 06:06

## 2023-12-31 RX ADMIN — HYDROCORTISONE SODIUM SUCCINATE 100 MG: 100 INJECTION, POWDER, FOR SOLUTION INTRAMUSCULAR; INTRAVENOUS at 08:26

## 2023-12-31 RX ADMIN — CARBAMAZEPINE 150 MG: 100 SUSPENSION ORAL at 00:10

## 2023-12-31 RX ADMIN — VANCOMYCIN HYDROCHLORIDE 750 MG: 500 INJECTION, POWDER, LYOPHILIZED, FOR SOLUTION INTRAVENOUS at 11:58

## 2023-12-31 ASSESSMENT — ACTIVITIES OF DAILY LIVING (ADL)
ADLS_ACUITY_SCORE: 51
ADLS_ACUITY_SCORE: 55
ADLS_ACUITY_SCORE: 51
ADLS_ACUITY_SCORE: 51
ADLS_ACUITY_SCORE: 55
ADLS_ACUITY_SCORE: 51
ADLS_ACUITY_SCORE: 51
ADLS_ACUITY_SCORE: 55
ADLS_ACUITY_SCORE: 51
ADLS_ACUITY_SCORE: 51
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
## 2023-12-31 NOTE — PHARMACY-VANCOMYCIN DOSING SERVICE
Pharmacy Vancomycin Note  Date of Service 2023  Patient's  1960   63 year old, male    Indication: Healthcare-Associated Pneumonia  Day of Therapy: 2  Current vancomycin regimen:  750 mg IV q12h  Current vancomycin monitoring method: AUC  Current vancomycin therapeutic monitoring goal: 400-600 mg*h/L    InsightRX Prediction of Current Vancomycin Regimen  Regimen: 750 mg IV every 12 hours.  Start time: 23:58 on 2023  Exposure target: AUC24 (range)400-600 mg/L.hr   AUC24,ss: 361 mg/L.hr  Probability of AUC24 > 400: 30 %  Ctrough,ss: 10.7 mg/L  Probability of Ctrough,ss > 20: 1 %  Probability of nephrotoxicity (Lodise ERNESTO ): 6 %      Current estimated CrCl = Estimated Creatinine Clearance: 98.9 mL/min (based on SCr of 0.76 mg/dL).    Creatinine for last 3 days  2023: 10:39 PM Creatinine 1.42 mg/dL  2023:  7:49 AM Creatinine 1.04 mg/dL;  4:36 PM Creatinine 0.93 mg/dL  2023:  4:44 AM Creatinine 0.76 mg/dL    Recent Vancomycin Levels (past 3 days)  2023: 11:51 AM Vancomycin 11.6 ug/mL    Vancomycin IV Administrations (past 72 hours)                     vancomycin (VANCOCIN) 750 mg in sodium chloride 0.9 % 250 mL intermittent infusion (mg) 750 mg New Bag 23 1158     750 mg New Bag  0010     750 mg New Bag 23 1151    vancomycin (VANCOCIN) 1,750 mg in 0.9% NaCl 500 mL intermittent infusion (mg) 1,750 mg Given 23 2348                    Nephrotoxins and other renal medications (From now, onward)      Start     Dose/Rate Route Frequency Ordered Stop    24 0000  vancomycin (VANCOCIN) 1,000 mg in 200 mL dextrose intermittent infusion         1,000 mg  200 mL/hr over 1 Hours Intravenous EVERY 12 HOURS 23 1332      23 1600  norepinephrine (LEVOPHED) 4 mg in  mL infusion PREMIX         0.01-0.6 mcg/kg/min × 72.6 kg  2.7-163.4 mL/hr  Intravenous CONTINUOUS 23 1555                 Contrast Orders - past 72 hours (72h ago, onward)       Start     Dose/Rate Route Frequency Stop    12/30/23 0110  iopamidol (ISOVUE-370) solution 81 mL         81 mL Intravenous ONCE 12/30/23 0137            Interpretation of levels and current regimen:  Vancomycin level is reflective of -600    Has serum creatinine changed greater than 50% in last 72 hours: No    Urine output:  good urine output    Renal Function: Stable    InsightRX Prediction of Planned New Vancomycin Regimen  Regimen: 1000 mg IV every 12 hours.  Start time: 23:58 on 12/31/2023  Exposure target: AUC24 (range)400-600 mg/L.hr   AUC24,ss: 478 mg/L.hr  Probability of AUC24 > 400: 82 %  Ctrough,ss: 14.2 mg/L  Probability of Ctrough,ss > 20: 11 %  Probability of nephrotoxicity (Lodise ERNESTO 2009): 9 %    Plan:  Increase Dose to 1000mg q12h  Vancomycin monitoring method: AUC  Vancomycin therapeutic monitoring goal: 400-600 mg*h/L  Pharmacy will check vancomycin levels as appropriate in 1-3 Days.  Serum creatinine levels will be ordered daily for the first week of therapy and at least twice weekly for subsequent weeks.    Kennedi Willoughby, [facility], PharmD

## 2023-12-31 NOTE — PLAN OF CARE
Problem: Adult Inpatient Plan of Care  Goal: Plan of Care Review  Description: The Plan of Care Review/Shift note should be completed every shift.  The Outcome Evaluation is a brief statement about your assessment that the patient is improving, declining, or no change.  This information will be displayed automatically on your shift  note.  Outcome: Progressing  Flowsheets (Taken 12/31/2023 0528)  Outcome Evaluation: Off Levo since 2015 yesterday. stable on RA. neuros unchanged. large BM on shift.  Plan of Care Reviewed With: patient  Overall Patient Progress: improving     Problem: Risk for Delirium  Goal: Improved Sleep  Intervention: Promote Sleep  Flowsheets (Taken 12/31/2023 0528)  Sleep/Rest Enhancement:   awakenings minimized   consistent schedule promoted   music provided

## 2023-12-31 NOTE — PHARMACY
MD Notification    Notified Person: MD    Notified Person Name: Jhonatan Ellis    Notification Date/Time: 12/30 1630    Notification Interaction: in person    Purpose of Notification:    Patient admitted to the ER on 12/29/23 at 2229.  He is on Carbamazepine 150mg given TID and  100mg at 1800 at home.      ER doctor ordered Carbamazepine 150mg TID at 0329 on 12/30.  The hospitalist admit orders for Carbamazepine 150mg TID and 100mg [id number] daily were also released in the ER as he was boarding there.     Carbamazepine 150 mg per FT was given at 0452, 0905, 1133, and 1500.       The patient transferred from ER to ICU at 1551 on 12/30. I noticed the duplicated Carbamazepine 150mg TID order and discontinued the ER provider order. The patient received 600mg of Carbamazepine already.  The prescribed dose for the patient totals 550mg in a day.      Orders Received:   The intensivist agreed to hold evening doses of carbamazepine dose.      Comments:  Compass entered

## 2023-12-31 NOTE — PLAN OF CARE
VSS. Tele SR, SB when sleeping. Alert, follows commands. TF running @ 15cc/hr and is to be increased per orders at 1700. BS active, no BM this shift. External urinary catheter in place, voiding adequately. LS diminished. Placed 2LPM NC when sleeping but otherwise on RA. Mitt in place to L hand due to pulling lines, CMS intact. Central line removed. Pt to transfer to [facility] with all belongings and meds. Mother, Savannah, updated at bedside this afternoon. Report to be given to receiving RN.

## 2023-12-31 NOTE — PROGRESS NOTES
RN found pt in room holding central line dressing at 0600. RN cleaned site thoroughly and redressed site.

## 2024-01-01 LAB
BASOPHILS # BLD AUTO: 0.1 10E3/UL (ref 0–0.2)
BASOPHILS NFR BLD AUTO: 1 %
EOSINOPHIL # BLD AUTO: 0.1 10E3/UL (ref 0–0.7)
EOSINOPHIL NFR BLD AUTO: 1 %
ERYTHROCYTE [DISTWIDTH] IN BLOOD BY AUTOMATED COUNT: 13.7 % (ref 10–15)
GLUCOSE BLDC GLUCOMTR-MCNC: 133 MG/DL (ref 70–99)
GLUCOSE BLDC GLUCOMTR-MCNC: 139 MG/DL (ref 70–99)
GLUCOSE BLDC GLUCOMTR-MCNC: 140 MG/DL (ref 70–99)
GLUCOSE BLDC GLUCOMTR-MCNC: 149 MG/DL (ref 70–99)
GLUCOSE BLDC GLUCOMTR-MCNC: 186 MG/DL (ref 70–99)
GLUCOSE BLDC GLUCOMTR-MCNC: 196 MG/DL (ref 70–99)
HCT VFR BLD AUTO: 28.8 % (ref 40–53)
HGB BLD-MCNC: 9.1 G/DL (ref 13.3–17.7)
IMM GRANULOCYTES # BLD: 0 10E3/UL
IMM GRANULOCYTES NFR BLD: 1 %
LACTATE SERPL-SCNC: 1.2 MMOL/L (ref 0.7–2)
LYMPHOCYTES # BLD AUTO: 2.4 10E3/UL (ref 0.8–5.3)
LYMPHOCYTES NFR BLD AUTO: 41 %
MCH RBC QN AUTO: 29.3 PG (ref 26.5–33)
MCHC RBC AUTO-ENTMCNC: 31.6 G/DL (ref 31.5–36.5)
MCV RBC AUTO: 93 FL (ref 78–100)
MONOCYTES # BLD AUTO: 0.4 10E3/UL (ref 0–1.3)
MONOCYTES NFR BLD AUTO: 7 %
NEUTROPHILS # BLD AUTO: 2.9 10E3/UL (ref 1.6–8.3)
NEUTROPHILS NFR BLD AUTO: 49 %
NRBC # BLD AUTO: 0 10E3/UL
NRBC BLD AUTO-RTO: 0 /100
PLATELET # BLD AUTO: 254 10E3/UL (ref 150–450)
RBC # BLD AUTO: 3.11 10E6/UL (ref 4.4–5.9)
WBC # BLD AUTO: 5.9 10E3/UL (ref 4–11)

## 2024-01-01 PROCEDURE — 84145 PROCALCITONIN (PCT): CPT | Performed by: STUDENT IN AN ORGANIZED HEALTH CARE EDUCATION/TRAINING PROGRAM

## 2024-01-01 PROCEDURE — 250N000013 HC RX MED GY IP 250 OP 250 PS 637: Performed by: STUDENT IN AN ORGANIZED HEALTH CARE EDUCATION/TRAINING PROGRAM

## 2024-01-01 PROCEDURE — 250N000011 HC RX IP 250 OP 636: Performed by: HOSPITALIST

## 2024-01-01 PROCEDURE — 250N000011 HC RX IP 250 OP 636: Mod: JZ | Performed by: STUDENT IN AN ORGANIZED HEALTH CARE EDUCATION/TRAINING PROGRAM

## 2024-01-01 PROCEDURE — 84100 ASSAY OF PHOSPHORUS: CPT | Performed by: STUDENT IN AN ORGANIZED HEALTH CARE EDUCATION/TRAINING PROGRAM

## 2024-01-01 PROCEDURE — 80053 COMPREHEN METABOLIC PANEL: CPT | Performed by: STUDENT IN AN ORGANIZED HEALTH CARE EDUCATION/TRAINING PROGRAM

## 2024-01-01 PROCEDURE — 99233 SBSQ HOSP IP/OBS HIGH 50: CPT | Performed by: HOSPITALIST

## 2024-01-01 PROCEDURE — 83605 ASSAY OF LACTIC ACID: CPT | Performed by: STUDENT IN AN ORGANIZED HEALTH CARE EDUCATION/TRAINING PROGRAM

## 2024-01-01 PROCEDURE — 250N000011 HC RX IP 250 OP 636: Performed by: STUDENT IN AN ORGANIZED HEALTH CARE EDUCATION/TRAINING PROGRAM

## 2024-01-01 PROCEDURE — 120N000001 HC R&B MED SURG/OB

## 2024-01-01 PROCEDURE — 36415 COLL VENOUS BLD VENIPUNCTURE: CPT | Performed by: STUDENT IN AN ORGANIZED HEALTH CARE EDUCATION/TRAINING PROGRAM

## 2024-01-01 PROCEDURE — 83735 ASSAY OF MAGNESIUM: CPT | Performed by: STUDENT IN AN ORGANIZED HEALTH CARE EDUCATION/TRAINING PROGRAM

## 2024-01-01 PROCEDURE — 85025 COMPLETE CBC W/AUTO DIFF WBC: CPT | Performed by: STUDENT IN AN ORGANIZED HEALTH CARE EDUCATION/TRAINING PROGRAM

## 2024-01-01 RX ADMIN — Medication 50 MCG: at 08:58

## 2024-01-01 RX ADMIN — INSULIN ASPART 2 UNITS: 100 INJECTION, SOLUTION INTRAVENOUS; SUBCUTANEOUS at 00:28

## 2024-01-01 RX ADMIN — CARBAMAZEPINE 150 MG: 100 SUSPENSION ORAL at 00:27

## 2024-01-01 RX ADMIN — VANCOMYCIN HYDROCHLORIDE 1000 MG: 1 INJECTION, SOLUTION INTRAVENOUS at 00:27

## 2024-01-01 RX ADMIN — Medication 40 MG: at 06:30

## 2024-01-01 RX ADMIN — LEVOTHYROXINE SODIUM 125 MCG: 125 TABLET ORAL at 08:58

## 2024-01-01 RX ADMIN — HEPARIN SODIUM 5000 UNITS: 5000 INJECTION, SOLUTION INTRAVENOUS; SUBCUTANEOUS at 22:38

## 2024-01-01 RX ADMIN — BRIVARACETAM 100 MG: 10 SOLUTION ORAL at 20:07

## 2024-01-01 RX ADMIN — CARBAMAZEPINE 100 MG: 100 SUSPENSION ORAL at 18:49

## 2024-01-01 RX ADMIN — CYANOCOBALAMIN TAB 1000 MCG 2500 MCG: 1000 TAB at 08:58

## 2024-01-01 RX ADMIN — CARBAMAZEPINE 150 MG: 100 SUSPENSION ORAL at 11:29

## 2024-01-01 RX ADMIN — Medication 60 ML: at 11:29

## 2024-01-01 RX ADMIN — CARBAMAZEPINE 150 MG: 100 SUSPENSION ORAL at 06:30

## 2024-01-01 RX ADMIN — OXYCODONE HYDROCHLORIDE AND ACETAMINOPHEN 1000 MG: 500 TABLET ORAL at 08:58

## 2024-01-01 RX ADMIN — FLUDROCORTISONE ACETATE 0.1 MG: 0.1 TABLET ORAL at 08:58

## 2024-01-01 RX ADMIN — LEVOFLOXACIN 750 MG: 5 INJECTION, SOLUTION INTRAVENOUS at 22:48

## 2024-01-01 RX ADMIN — INSULIN ASPART 1 UNITS: 100 INJECTION, SOLUTION INTRAVENOUS; SUBCUTANEOUS at 17:34

## 2024-01-01 RX ADMIN — HYDROCORTISONE SODIUM SUCCINATE 50 MG: 100 INJECTION, POWDER, FOR SOLUTION INTRAMUSCULAR; INTRAVENOUS at 00:28

## 2024-01-01 RX ADMIN — HEPARIN SODIUM 5000 UNITS: 5000 INJECTION, SOLUTION INTRAVENOUS; SUBCUTANEOUS at 06:30

## 2024-01-01 RX ADMIN — HEPARIN SODIUM 5000 UNITS: 5000 INJECTION, SOLUTION INTRAVENOUS; SUBCUTANEOUS at 14:22

## 2024-01-01 RX ADMIN — VANCOMYCIN HYDROCHLORIDE 1000 MG: 1 INJECTION, SOLUTION INTRAVENOUS at 13:41

## 2024-01-01 RX ADMIN — HYDROCORTISONE SODIUM SUCCINATE 50 MG: 100 INJECTION, POWDER, FOR SOLUTION INTRAMUSCULAR; INTRAVENOUS at 22:40

## 2024-01-01 RX ADMIN — HYDROCORTISONE SODIUM SUCCINATE 50 MG: 100 INJECTION, POWDER, FOR SOLUTION INTRAMUSCULAR; INTRAVENOUS at 08:58

## 2024-01-01 RX ADMIN — INSULIN ASPART 1 UNITS: 100 INJECTION, SOLUTION INTRAVENOUS; SUBCUTANEOUS at 04:24

## 2024-01-01 RX ADMIN — BRIVARACETAM 100 MG: 10 SOLUTION ORAL at 08:57

## 2024-01-01 ASSESSMENT — ACTIVITIES OF DAILY LIVING (ADL)
ADLS_ACUITY_SCORE: 63
ADLS_ACUITY_SCORE: 59
ADLS_ACUITY_SCORE: 65
ADLS_ACUITY_SCORE: 59
ADLS_ACUITY_SCORE: 65
ADLS_ACUITY_SCORE: 59
ADLS_ACUITY_SCORE: 55
ADLS_ACUITY_SCORE: 59
ADLS_ACUITY_SCORE: 65
ADLS_ACUITY_SCORE: 65

## 2024-01-01 NOTE — PLAN OF CARE
Occupational Therapy: Orders received, chart reviewed and discussed with PT. Therapy is not indicated as pt is moving at baseline of lift dependent/receives assist w/ all ADLs/IADLs at baseline. Nursing staff able to meet patient's mobility/ADL needs with lift while hospitalized. Will complete OT orders, defer discharge planning to medical team.

## 2024-01-01 NOTE — PLAN OF CARE
Summary: 63 year old transferred from ICU 12/31 - history of TBI admitted for dyspnea and elevated respiratory rate as well as positive Covid.     DATE & TIME: 01/01/2024 Day shift  Cognitive Concerns/ Orientation: Patient is alert and oriented x4- can't speak much but does understand; Calm and cooperative.   BEHAVIOR & AGGRESSION TOOL COLOR: Green   ABNL VS/O2: VSS on RA,   MOBILITY: A2, lift. T&R q2h- baseline  PAIN MANAGMENT: Denies  DIET: Continuous TPN 35 ml/hr- increase by 15 ml at 1700  BOWEL/BLADDER: Incontinent of B&B, external catheter doesn't work for patient; no BM  ABNL LAB/BG: /133   DRAIN/DEVICES: PIV, G/J tube  TELEMETRY RHYTHM: NA  SKIN: Sacrum and perineal redness/abrasions. Scattered bruises.  TESTS/PROCEDURES: None this shift.   D/C DAY/GOALS/PLACE: Once off IV antibiotics-   OTHER IMPORTANT INFO: Pt. Has a fernando on L hand due to him pulling at his lines; mom Savannah visited and updated.

## 2024-01-01 NOTE — PLAN OF CARE
Physical Therapy: Orders received, chart reviewed and discussed with care team. Therapy not indicated as to patient is moving at his baseline of lift dependent and dependent for all cares. Nursing can provide mobility with lift, will complete PT orders. Defer discharge recs to medical team.
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
## 2024-01-01 NOTE — PLAN OF CARE
Summary: 63 year old transferred from ICU 12/31 - history of TBI admitted for dyspnea and elevated respiratory rate as well as positive Covid.     DATE & TIME: 01/01/2024 1255-1216  Cognitive Concerns/ Orientation: Calm and cooperative.   BEHAVIOR & AGGRESSION TOOL COLOR: Green   ABNL VS/O2: VSS on RA, Continuous pulse ox.  MOBILITY: A2, lift. T&Rq2h.   PAIN MANAGMENT: None this shift.   DIET: Continuous TPN 35 ml/hr  BOWEL/BLADDER: Incontinent of B&B, external catheter.   ABNL LAB/BG: BS: 196, 186. Na: 148 (D5W done overnight).  DRAIN/DEVICES: PIV, G/J tube, external cath.  TELEMETRY RHYTHM: NSR  SKIN: Sacrum and  perineal redness/abrasions. Scattered bruises.  TESTS/PROCEDURES: Non this shift.   D/C DAY/GOALS/PLACE: Pending  OTHER IMPORTANT INFO: Pt. Has a fernadno on L hand due to him pulling at his lines (need new order for restraint on 1/1/24 @ 0854). On mag, k, and phos protocols.

## 2024-01-01 NOTE — PLAN OF CARE
Physical therapy - Orders received, chart reviewed and discussed with RN. Therapy is not indicated as pt is moving at baseline of lift dependent. Nursing staff able to meet patient's mobility needs with lift while hospitalized. Will complete PT orders, defer discharge planning to medical team.

## 2024-01-01 NOTE — PLAN OF CARE
Summary: 63 year old transferred from ICU 12/31 - history of TBI admitted for dyspnea and elevated respiratory rate as well as positive Covid.   DATE & TIME: 12/31  Evening    Cognitive Concerns/ Orientation : pleasant alert opens eyes spontaneously   BEHAVIOR & AGGRESSION TOOL COLOR: Green   ABNL VS/O2: VSS on RA, cont pulse ox  MOBILITY: Total, T/R  PAIN MANAGMENT: no signs of pain  DIET: TPN 35 ml/hr  BOWEL/BLADDER: Incontinent, external bladder  ABNL LAB/BG: ,125  hypernatremia 148->D5W for 10 hours, paused at 2230 run for 5 more hours   DRAIN/DEVICES: PIV, G/J tube, external cath- levo running until midnight then restart D5W  50ml/hr for 5 hours total of 10 hours.  TELEMETRY RHYTHM: NSR  SKIN: sacrum and  perineal redness/abrasions,   TESTS/PROCEDURES:   D/C DAY/GOALS/PLACE: Pending  OTHER IMPORTANT INFO: mother Savannah is guardian and caregiver at home.

## 2024-01-02 LAB
ALBUMIN SERPL BCG-MCNC: 2.8 G/DL (ref 3.5–5.2)
ALP SERPL-CCNC: 54 U/L (ref 40–150)
ALT SERPL W P-5'-P-CCNC: 48 U/L (ref 0–70)
ANION GAP SERPL CALCULATED.3IONS-SCNC: 7 MMOL/L (ref 7–15)
AST SERPL W P-5'-P-CCNC: 71 U/L (ref 0–45)
BACTERIA UR CULT: ABNORMAL
BILIRUB SERPL-MCNC: <0.2 MG/DL
BUN SERPL-MCNC: 16.4 MG/DL (ref 8–23)
CALCIUM SERPL-MCNC: 7.5 MG/DL (ref 8.8–10.2)
CHLORIDE SERPL-SCNC: 107 MMOL/L (ref 98–107)
CREAT SERPL-MCNC: 0.7 MG/DL (ref 0.67–1.17)
CREAT SERPL-MCNC: 0.73 MG/DL (ref 0.67–1.17)
DEPRECATED HCO3 PLAS-SCNC: 29 MMOL/L (ref 22–29)
EGFRCR SERPLBLD CKD-EPI 2021: >90 ML/MIN/1.73M2
EGFRCR SERPLBLD CKD-EPI 2021: >90 ML/MIN/1.73M2
GLUCOSE BLDC GLUCOMTR-MCNC: 117 MG/DL (ref 70–99)
GLUCOSE BLDC GLUCOMTR-MCNC: 133 MG/DL (ref 70–99)
GLUCOSE BLDC GLUCOMTR-MCNC: 147 MG/DL (ref 70–99)
GLUCOSE BLDC GLUCOMTR-MCNC: 151 MG/DL (ref 70–99)
GLUCOSE BLDC GLUCOMTR-MCNC: 161 MG/DL (ref 70–99)
GLUCOSE BLDC GLUCOMTR-MCNC: 168 MG/DL (ref 70–99)
GLUCOSE BLDC GLUCOMTR-MCNC: 182 MG/DL (ref 70–99)
GLUCOSE SERPL-MCNC: 134 MG/DL (ref 70–99)
MAGNESIUM SERPL-MCNC: 2 MG/DL (ref 1.7–2.3)
MAGNESIUM SERPL-MCNC: 2 MG/DL (ref 1.7–2.3)
PHOSPHATE SERPL-MCNC: 2.2 MG/DL (ref 2.5–4.5)
PHOSPHATE SERPL-MCNC: 2.6 MG/DL (ref 2.5–4.5)
PLATELET # BLD AUTO: 241 10E3/UL (ref 150–450)
POTASSIUM SERPL-SCNC: 2.9 MMOL/L (ref 3.4–5.3)
POTASSIUM SERPL-SCNC: 4.1 MMOL/L (ref 3.4–5.3)
PROCALCITONIN SERPL IA-MCNC: 0.06 NG/ML
PROT SERPL-MCNC: 5.5 G/DL (ref 6.4–8.3)
SODIUM SERPL-SCNC: 143 MMOL/L (ref 135–145)
VANCOMYCIN SERPL-MCNC: 18.9 UG/ML

## 2024-01-02 PROCEDURE — 258N000003 HC RX IP 258 OP 636: Performed by: HOSPITALIST

## 2024-01-02 PROCEDURE — 83735 ASSAY OF MAGNESIUM: CPT | Performed by: INTERNAL MEDICINE

## 2024-01-02 PROCEDURE — 99233 SBSQ HOSP IP/OBS HIGH 50: CPT | Performed by: HOSPITALIST

## 2024-01-02 PROCEDURE — 250N000011 HC RX IP 250 OP 636: Performed by: HOSPITALIST

## 2024-01-02 PROCEDURE — 84100 ASSAY OF PHOSPHORUS: CPT | Performed by: INTERNAL MEDICINE

## 2024-01-02 PROCEDURE — 250N000011 HC RX IP 250 OP 636: Performed by: STUDENT IN AN ORGANIZED HEALTH CARE EDUCATION/TRAINING PROGRAM

## 2024-01-02 PROCEDURE — 36415 COLL VENOUS BLD VENIPUNCTURE: CPT | Performed by: INTERNAL MEDICINE

## 2024-01-02 PROCEDURE — 250N000009 HC RX 250: Performed by: HOSPITALIST

## 2024-01-02 PROCEDURE — 250N000011 HC RX IP 250 OP 636: Mod: JZ | Performed by: STUDENT IN AN ORGANIZED HEALTH CARE EDUCATION/TRAINING PROGRAM

## 2024-01-02 PROCEDURE — G0463 HOSPITAL OUTPT CLINIC VISIT: HCPCS

## 2024-01-02 PROCEDURE — 120N000001 HC R&B MED SURG/OB

## 2024-01-02 PROCEDURE — 250N000013 HC RX MED GY IP 250 OP 250 PS 637: Performed by: STUDENT IN AN ORGANIZED HEALTH CARE EDUCATION/TRAINING PROGRAM

## 2024-01-02 PROCEDURE — 84132 ASSAY OF SERUM POTASSIUM: CPT | Performed by: HOSPITALIST

## 2024-01-02 PROCEDURE — 36415 COLL VENOUS BLD VENIPUNCTURE: CPT | Performed by: STUDENT IN AN ORGANIZED HEALTH CARE EDUCATION/TRAINING PROGRAM

## 2024-01-02 PROCEDURE — 82565 ASSAY OF CREATININE: CPT | Performed by: STUDENT IN AN ORGANIZED HEALTH CARE EDUCATION/TRAINING PROGRAM

## 2024-01-02 PROCEDURE — 80202 ASSAY OF VANCOMYCIN: CPT | Performed by: INTERNAL MEDICINE

## 2024-01-02 PROCEDURE — 85049 AUTOMATED PLATELET COUNT: CPT | Performed by: STUDENT IN AN ORGANIZED HEALTH CARE EDUCATION/TRAINING PROGRAM

## 2024-01-02 PROCEDURE — 250N000013 HC RX MED GY IP 250 OP 250 PS 637: Performed by: HOSPITALIST

## 2024-01-02 RX ORDER — POTASSIUM CHLORIDE 1.5 G/1.58G
20 POWDER, FOR SOLUTION ORAL ONCE
Status: COMPLETED | OUTPATIENT
Start: 2024-01-02 | End: 2024-01-02

## 2024-01-02 RX ORDER — POTASSIUM CHLORIDE 1.5 G/1.58G
40 POWDER, FOR SOLUTION ORAL ONCE
Status: COMPLETED | OUTPATIENT
Start: 2024-01-02 | End: 2024-01-02

## 2024-01-02 RX ADMIN — VANCOMYCIN HYDROCHLORIDE 1000 MG: 1 INJECTION, SOLUTION INTRAVENOUS at 12:29

## 2024-01-02 RX ADMIN — POTASSIUM CHLORIDE 40 MEQ: 1.5 POWDER, FOR SOLUTION ORAL at 01:33

## 2024-01-02 RX ADMIN — Medication 40 MG: at 06:45

## 2024-01-02 RX ADMIN — SODIUM PHOSPHATE, MONOBASIC, MONOHYDRATE AND SODIUM PHOSPHATE, DIBASIC, ANHYDROUS 9 MMOL: 142; 276 INJECTION, SOLUTION INTRAVENOUS at 01:45

## 2024-01-02 RX ADMIN — INSULIN ASPART 1 UNITS: 100 INJECTION, SOLUTION INTRAVENOUS; SUBCUTANEOUS at 15:56

## 2024-01-02 RX ADMIN — LEVOTHYROXINE SODIUM 125 MCG: 125 TABLET ORAL at 08:18

## 2024-01-02 RX ADMIN — INSULIN ASPART 1 UNITS: 100 INJECTION, SOLUTION INTRAVENOUS; SUBCUTANEOUS at 00:37

## 2024-01-02 RX ADMIN — CYANOCOBALAMIN TAB 1000 MCG 2500 MCG: 1000 TAB at 08:18

## 2024-01-02 RX ADMIN — CARBAMAZEPINE 150 MG: 100 SUSPENSION ORAL at 05:27

## 2024-01-02 RX ADMIN — CARBAMAZEPINE 150 MG: 100 SUSPENSION ORAL at 00:39

## 2024-01-02 RX ADMIN — VANCOMYCIN HYDROCHLORIDE 1000 MG: 1 INJECTION, SOLUTION INTRAVENOUS at 00:31

## 2024-01-02 RX ADMIN — POTASSIUM CHLORIDE 20 MEQ: 1.5 POWDER, FOR SOLUTION ORAL at 03:29

## 2024-01-02 RX ADMIN — Medication 60 ML: at 11:35

## 2024-01-02 RX ADMIN — HEPARIN SODIUM 5000 UNITS: 5000 INJECTION, SOLUTION INTRAVENOUS; SUBCUTANEOUS at 06:45

## 2024-01-02 RX ADMIN — OXYCODONE HYDROCHLORIDE AND ACETAMINOPHEN 1000 MG: 500 TABLET ORAL at 08:18

## 2024-01-02 RX ADMIN — HEPARIN SODIUM 5000 UNITS: 5000 INJECTION, SOLUTION INTRAVENOUS; SUBCUTANEOUS at 14:12

## 2024-01-02 RX ADMIN — CARBAMAZEPINE 150 MG: 100 SUSPENSION ORAL at 11:36

## 2024-01-02 RX ADMIN — INSULIN ASPART 1 UNITS: 100 INJECTION, SOLUTION INTRAVENOUS; SUBCUTANEOUS at 11:32

## 2024-01-02 RX ADMIN — BRIVARACETAM 100 MG: 10 SOLUTION ORAL at 20:51

## 2024-01-02 RX ADMIN — BRIVARACETAM 100 MG: 10 SOLUTION ORAL at 08:18

## 2024-01-02 RX ADMIN — HYDROCORTISONE SODIUM SUCCINATE 50 MG: 100 INJECTION, POWDER, FOR SOLUTION INTRAMUSCULAR; INTRAVENOUS at 08:18

## 2024-01-02 RX ADMIN — INSULIN ASPART 1 UNITS: 100 INJECTION, SOLUTION INTRAVENOUS; SUBCUTANEOUS at 03:53

## 2024-01-02 RX ADMIN — HEPARIN SODIUM 5000 UNITS: 5000 INJECTION, SOLUTION INTRAVENOUS; SUBCUTANEOUS at 22:53

## 2024-01-02 RX ADMIN — FLUDROCORTISONE ACETATE 0.1 MG: 0.1 TABLET ORAL at 08:18

## 2024-01-02 RX ADMIN — CARBAMAZEPINE 100 MG: 100 SUSPENSION ORAL at 18:05

## 2024-01-02 RX ADMIN — Medication 50 MCG: at 08:18

## 2024-01-02 RX ADMIN — LEVOFLOXACIN 750 MG: 5 INJECTION, SOLUTION INTRAVENOUS at 22:53

## 2024-01-02 RX ADMIN — HYDROCORTISONE SODIUM SUCCINATE 50 MG: 100 INJECTION, POWDER, FOR SOLUTION INTRAMUSCULAR; INTRAVENOUS at 20:39

## 2024-01-02 ASSESSMENT — ACTIVITIES OF DAILY LIVING (ADL)
ADLS_ACUITY_SCORE: 71
ADLS_ACUITY_SCORE: 65
ADLS_ACUITY_SCORE: 71
ADLS_ACUITY_SCORE: 65
ADLS_ACUITY_SCORE: 71
ADLS_ACUITY_SCORE: 75

## 2024-01-02 NOTE — PHARMACY-VANCOMYCIN DOSING SERVICE
Pharmacy Vancomycin Note  Date of Service 2024  Patient's  1960   63 year old, male    Indication: Healthcare-Associated Pneumonia  Day of Therapy: 4  Current vancomycin regimen:  1000 mg IV q12h  Current vancomycin monitoring method: AUC  Current vancomycin therapeutic monitoring goal: 400-600 mg*h/L    [employer] Prediction of Current Vancomycin Regimen  Loading dose: N/A  Regimen: 1000 mg IV every 12 hours.  Start time: 12:31 on 2024  Exposure target: AUC24 (range)400-600 mg/L.hr   AUC24,ss: 505 mg/L.hr  Probability of AUC24 > 400: 95 %  Ctrough,ss: 15.8 mg/L  Probability of Ctrough,ss > 20: 12 %  Probability of nephrotoxicity (Lodise ERNESTO ): 11 %    Current estimated CrCl = Estimated Creatinine Clearance: 107.4 mL/min (based on SCr of 0.7 mg/dL).    Creatinine for last 3 days  2023:  4:36 PM Creatinine 0.93 mg/dL  2023:  4:44 AM Creatinine 0.76 mg/dL  2024: 11:11 PM Creatinine 0.73 mg/dL  2024:  7:19 AM Creatinine 0.70 mg/dL    Recent Vancomycin Levels (past 3 days)  2023: 11:51 AM Vancomycin 11.6 ug/mL  2024: 11:08 AM Vancomycin 18.9 ug/mL    Vancomycin IV Administrations (past 72 hours)                     vancomycin (VANCOCIN) 1,000 mg in 200 mL dextrose intermittent infusion (mg) 1,000 mg New Bag 24 0031     1,000 mg New Bag 24 1341     1,000 mg New Bag  0027    vancomycin (VANCOCIN) 750 mg in sodium chloride 0.9 % 250 mL intermittent infusion (mg) 750 mg New Bag 23 1158     750 mg New Bag  0010                    Nephrotoxins and other renal medications (From now, onward)      Start     Dose/Rate Route Frequency Ordered Stop    24 0000  vancomycin (VANCOCIN) 1,000 mg in 200 mL dextrose intermittent infusion         1,000 mg  200 mL/hr over 1 Hours Intravenous EVERY 12 HOURS 23 1332                 Contrast Orders - past 72 hours (72h ago, onward)      None            Interpretation of levels and current  regimen:  Vancomycin level is reflective of   Has serum creatinine changed greater than 50% in last 72 hours:   Urine output:  unable to determine/appears good output per flowsheets  Renal Function: Improving    Plan:  Continue Current Dose  Vancomycin monitoring method: AUC  Vancomycin therapeutic monitoring goal: 400-600 mg*h/L  Pharmacy will check vancomycin levels as appropriate in 3-5 Days.  Serum creatinine levels will be ordered daily for the first week of therapy and at least twice weekly for subsequent weeks.    Karen Palomino RPH

## 2024-01-02 NOTE — PLAN OF CARE
"Summary: transferred from ICU,  COVID.    DATE & TIME: 6/22/22 Evenings   Cognitive Concerns/ Orientation : More alert and responsive toward middle of shift. Giving the \"thumbs up\" and enjoying a movie in the evening with observable pa.   BEHAVIOR & AGGRESSION TOOL COLOR: Green   ABNL VS/O2: VSS, tolerating RA. Able to perform cough and clears when more awake. Afebrile this shift.   MOBILITY: Total cares, up with lift, turn/repo q2hr.   PAIN MANAGMENT: No signs of pain.   DIET: NPO. Tube feed running at 60 ml/hr (goal rate), (formula changed today by dietician), 60mL q4h water flush increased to 100 ml. All meds through tube.   BOWEL/BLADDER: Incontinent. External cath in use. Good and increased output than reported on days. No BM this shift.   ABNL LAB/BG: Phos 2.1, replaced and recheck in AM. Potassium 4.5 after replacement. Alb 2.8, Ast 47, , 319 with Q4h checks and coverage. Hgb 11.6  DRAIN/DEVICES: L midline, WDL ex no blood return, triple lumen (1 infusing NS TKO, other saline locked). GJ tube, WDL. External catheter.   SKIN: Coccyx and memo rash/blanchable redness, barrier cream and miconazole powder applied.    TELEMETRY RHYTHM: NSR  TESTS/PROCEDURES: AM Labs   D/C DAY/GOALS/PLACE: Pending improvement.  OTHER IMPORTANT INFO: Remdesivir completed; continues on IV dexamethasone, scheduled nebs. LS course, infrequent congested cough. Bed alarm on for safety. Special Precautions maintained. Rounded on freq.                      "
DATE & TIME: 6/21/22 1713-0484  Cognitive Concerns/ Orientation : Alert in the morning, gave a thumbs up; but more sleepy/withdrawn this afternoon. HECTOR orientation, occasionally yells out or says 1-2 words.   BEHAVIOR & AGGRESSION TOOL COLOR: Green   ABNL VS/O2: VSS on RA  MOBILITY: Total cares, up with lift, turn/repo q2hr.   PAIN MANAGMENT: Denies/No signs of pain.   DIET: NPO. Tube feed running at 60 ml/hr (goal rate), 60mL q4h water flush.   BOWEL/BLADDER: Incontinent. External cath in use. No BM this shift.  ABNL LAB/BG: Phos recheck 2.4, replacement initiated, last dose to be given this evening; recheck in am. , 278, 290. Calcium 8.4. ALT 80, AST 76. Hgb 11.1.   DRAIN/DEVICES: L midline, WDL ex no blood return, triple lumen (1 infusing, other saline locked). GJ tube, WDL. External catheter.   SKIN: Coccyx and memo rash/blanchable redness, barrier cream applied per WOC orders.   TELEMETRY RHYTHM: NSR  TESTS/PROCEDURES:  non scheduled.   D/C DAY/GOALS/PLACE: Pending improvement.  OTHER IMPORTANT INFO: Continues on IV Zosyn; vancomycin discontinued. Remdesivir completed; continues on IV dexamethasone, scheduled nebs. LS diminished, infrequent congested cough.     
DATE & TIME: 6/22-6/23 0440-4379  Cognitive Concerns/ Orientation: Alert, HECTOR orientation.   BEHAVIOR & AGGRESSION TOOL COLOR: Green   ABNL VS/O2: VSS on RA. Coughing infrequently, productive at times. Oral cares done to clear mouth when pt allows. Suction added to bedside.  MOBILITY: Total cares. Ax2 w/ lift. Turn & repos q2 hrs.   PAIN MANAGMENT: No signs of pain. Resting comfortably throughout the night.   DIET: NPO. Tube feed running at 60 ml/hr (goal rate), with 100 mL flushes q 4 hrs . Formula changed yesterday by dietician. All meds through feeding tube.   BOWEL/BLADDER: Incontinent B&B. External cath in place. Large, loose BM this am.   ABNL LAB/BG: Phos 2.1, replaced and recheck in AM. Potassium 4.5 after replacement. Alb 2.8. AST 47. , 200. Hgb 11.6  DRAIN/DEVICES: L midline, WDL ex no blood return, triple lumen (white cap infusing NS TKO. Gray & red SL). GJ tube, WDL. External catheter.   SKIN: Coccyx and memo rash/blanchable redness  TELEMETRY RHYTHM: NSR  TESTS/PROCEDURES: AM Labs   D/C DAY/GOALS/PLACE: Pending improvement.  OTHER IMPORTANT INFO: Remdesivir completed; continues on IV dexamethasone, and scheduled nebs. LS course, infrequent congested cough. Oral cares attempted frequently, pt refused at times. Special & contact precautions maintained.   
DATE & TIME: 6/22/22 6303-6846  Cognitive Concerns/ Orientation : Awake, but very withdrawn; HECTOR orientation.   BEHAVIOR & AGGRESSION TOOL COLOR: Green   ABNL VS/O2: VSS on RA ex fever, 102.0 rectally this am, Tylenol given, down to 101.5, MD notified, PRN Ibuprofen ordered and given, down to 100.0 axillary.   MOBILITY: Total cares, up with lift, turn/repo q2hr.   PAIN MANAGMENT: Denies/No signs of pain.   DIET: NPO. Tube feed running at 60 ml/hr (goal rate), (formula changed today by dietician), 60mL q4h water flush, increased to 100 ml.   BOWEL/BLADDER: Incontinent. External cath in use, lower output today. No BM this shift.   ABNL LAB/BG: Phos recheck 2.1, replacement started, recheck in am.   -K 3.3, MD notified, RN managed replacement protocol ordered, replaced, recheck at 1630.   -BG q4: 123, 22.   -Hgb 11.6, Platelets 115.   DRAIN/DEVICES: L midline, WDL ex no blood return, triple lumen (1 infusing NS TKO, other saline locked). GJ tube, WDL. External catheter.   SKIN: Coccyx and memo rash/blanchable redness, barrier cream and miconazole powder applied.    TELEMETRY RHYTHM: NSR  TESTS/PROCEDURES: n/a  D/C DAY/GOALS/PLACE: Pending improvement.  OTHER IMPORTANT INFO: Remdesivir completed; continues on IV dexamethasone, scheduled nebs. LS course, infrequent congested cough.     
DATE & TIME: 6/27/22 7936-1753    Cognitive Concerns/ Orientation : HECTOR orientation. Nonverbal, pt yells sounds and gives thumbs up.    BEHAVIOR & AGGRESSION TOOL COLOR: Green   ABNL VS/O2: VSS on RA  MOBILITY: Lift, fall risk. Reposition q2h.  PAIN MANAGMENT: No signs of pain  DIET: NPO. Tube feed infusing at 60cc/hr. H2O flushes 100cc q4h.  BOWEL/BLADDER: Incontinent of bowel and bladder.  ABNL LAB/BG: Mg 2.5/Phos 2.2 (currently replacing, recheck in AM)/WBC 11.7/Hgb 11.5/, 170 114  DRAIN/DEVICES: PICC infusing, TKO. GJ tube.  SKIN: Coccyx redness blanchable. Jessica rash improving.  D/C DATE: Pt needs 3 more days of antibiotics  OTHER IMPORTANT INFO: Lung sounds diminished. Infrequent, dry cough. Continues on special precautions and pulsate mattress. Pt awake most of the night. Finally started falling asleep around 0500.    
DATE & TIME: 6/29/22 8709-0696    Cognitive Concerns/ Orientation : HECTOR orientation. Pt mostly nonverbal sometimes gives one word answers and will give a thumbs up.   BEHAVIOR & AGGRESSION TOOL COLOR: Green   ABNL VS/O2: VSS on RA  MOBILITY: Lift, fall risk. Reposition q2h.  PAIN MANAGMENT: No signs of pain  DIET: NPO. Tube feed infusing at 60cc/hr. H2O flushes 100cc q4h.  BOWEL/BLADDER: Incontinent of bowel and bladder. External catheter in place.  ABNL LAB/BG: WBC 11.3/, 133, 116  DRAIN/DEVICES: PICC infusing, TKO. GJ tube.  SKIN: Jessica rash improving. Coccyx redness blanchable.  D/C DATE: Plan for discharge on Friday after completion of IV antibiotics.  OTHER IMPORTANT INFO: Lung sounds clear/diminished. Infrequent, dry cough. Special precautions, pulsate mattress.    
DATE & TIME: 6/30/22, 1270 - 9587    Cognitive Concerns/ Orientation : HECTOR,  Mostly nonverbal but will shake head yes/no or thumbs up   BEHAVIOR & AGGRESSION TOOL COLOR: Green   ABNL VS/O2: VSS on room air  MOBILITY: Lift, assist x 2. Turned and repositioned. On pulsate mattress  PAIN MANAGMENT: Denied  DIET: Tube feeding at 60 ml/hr and water flushes of 100 ml q4h  BOWEL/BLADDER: Incontinent of B/B. External catheter on  ABNL LAB/BG: /140  DRAIN/DEVICES: GJ tube, PICC. PICC to be removed before patients leave for home  TELEMETRY RHYTHM: NA  SKIN: Jessica area rash improving. Mepilex to coccyx  TESTS/PROCEDURES: NA  D/C DATE: For discharge today at 1030 am  OTHER IMPORTANT INFO: Special and contact precautions maintained    Goal Outcome Evaluation:    Plan of Care Reviewed With: patient     Overall Patient Progress: improving           
Detail Level: Detailed
Goal Outcome Evaluation:           Summary:   COVID.    DATE & TIME: 6/23/22-6/24/22 3745-8288  Cognitive Concerns/ Orientation: Alert, HECTOR orientation. Speech garbled, can give thumbs up  BEHAVIOR & AGGRESSION TOOL COLOR: Green   ABNL VS/O2: VSS on room air. Congested cough, . Oral cares provided during rounding which pt resists,and turning  MOBILITY: Total cares. Ax2 w/ lift. Turn & repos q2 hrs.   PAIN MANAGMENT: No signs of pain..   DIET: NPO. Tube feed running at 60 ml/hr (goal rate), with 100 mL q 4 hrs flushed.  BOWEL/BLADDER: Incontinent B&B.   ABNL LAB/BG: bgm 218/260  DRAIN/DEVICES: L midline, WDL ex no blood return, triple lumen (white cap infusing NS TKO. Gray & red SL). GJ tube.   SKIN: Coccyx re blanchable.and memo rash redness  TELEMETRY RHYTHM: NSR  TESTS/PROCEDURES: AM Labs   D/C DAY/GOALS/PLACE: Pending improvement.  OTHER IMPORTANT INFO: Continues on IV dexamethasone, and scheduled nebs. Lungs sound clear, congested cough. Oral cares provided. Special & contact precautions maintained. Pulsate mattress in place on shift. Dosed with vanco for new MRSA toda           
Goal Outcome Evaluation:      DATE & TIME: 6/30/22 2991-2390     Cognitive Concerns/ Orientation : HECTOR orientation. Pt mostly nonverbal sometimes gives one word answers and will give a thumbs up.   BEHAVIOR & AGGRESSION TOOL COLOR: Green   ABNL VS/O2: VSS on RA  MOBILITY: Lift, fall risk. Reposition q2h.  PAIN MANAGMENT: No signs of pain  DIET: NPO. Tube feed infusing at 60cc/hr. H2O flushes 100cc q4h.  BOWEL/BLADDER: Incontinent of bowel x1 and bladder. External catheter in place.  ABNL LAB/BG: WBC 9.5/BG 79, 170  Phosphorus recheck AM  DRAIN/DEVICES: PICC infusing, TKO.- intermittent zosyn. PICC will come out tomorrow before he leaves. GJ tube.  SKIN: Jessica rash improving. Coccyx redness blanchable.  D/C DATE: Plan for discharge on Friday after completion of IV antibiotics.  OTHER IMPORTANT INFO: Lung sounds clear/diminished. Infrequent, dry cough. Special precautions, pulsate mattress.                  
Goal Outcome Evaluation:      Summary:   COVID.    DATE & TIME: 6- 2130  Cognitive Concerns/ Orientation: Alert, HECTOR orientation. Speech garbled, can give thumbs up  BEHAVIOR & AGGRESSION TOOL COLOR: Green   ABNL VS/O2: VSS on room air. Congested cough, Oral cares provided during rounding which pt resists.  MOBILITY: Total cares. Turn & reposition q2 hrs.   PAIN MANAGMENT: No signs of pain.  DIET: NPO. Tube feed running at 60 ml/hr (goal rate), with 100 mL q 4 hrs flushed.  BOWEL/BLADDER: Incontinent B&B.   ABNL LAB/BG: , 130  DRAIN/DEVICES: L midline, WDL ex no blood return, triple lumen (white cap infusing NS TKO. Gray & red SL). GJ tube.   SKIN: Coccyx red blanchable and memo rash redness  TELEMETRY RHYTHM: NSR  TESTS/PROCEDURES: N/A  D/C DAY/GOALS/PLACE: Pending improvement.  OTHER IMPORTANT INFO: Continues on IV dexamethasone, and scheduled nebs. Lungs sound clear, congested cough. Oral cares provided. Special & contact precautions maintained. Pulsate mattress in place. Dosed with vanco for new MRSA.                  
Goal Outcome Evaluation:      Summary:   COVID.    DATE & TIME: 6--6- 2614-8841  Cognitive Concerns/ Orientation: Alert, HECTOR orientation. Speech garbled, can give thumbs up  BEHAVIOR & AGGRESSION TOOL COLOR: Green   ABNL VS/O2: VSS on room air. Congested cough, Oral cares provided during rounding which pt resists.  MOBILITY: Total cares. Turn & reposition q2 hrs.   PAIN MANAGMENT: No signs of pain.  DIET: NPO. Tube feed running at 60 ml/hr (goal rate), with 100 mL q 4 hrs flushes which is programed.  BOWEL/BLADDER: Incontinent B&B.   ABNL LAB/BG: ,136  DRAIN/DEVICES: L midline, WDL ex no blood return, triple lumen (white cap infusing NS TKO. Gray & red SL). GJ tube.   SKIN: Coccyx red blanchable and memo rash redness  TELEMETRY RHYTHM: NSR  TESTS/PROCEDURES: N/A  D/C DAY/GOALS/PLACE: Pending improvement.  OTHER IMPORTANT INFO: Continues on IV dexamethasone, and scheduled nebs. Lungs sound clear, congested cough. Oral cares provided. Special & contact precautions maintained. Pulsate mattress in place. Dosed with vanco for new MRSA.                  
Goal Outcome Evaluation:      Summary:   COVID.    DATE & TIME: 6--6/25/2022 0807-8038  Cognitive Concerns/ Orientation: Alert, HECTOR orientation. Speech garbled, can give thumbs up.  BEHAVIOR & AGGRESSION TOOL COLOR: Green   ABNL VS/O2: VSS on room air. Congested cough, Oral cares provided during rounding which pt resists.  MOBILITY: Total cares. Turn & reposition q2 hrs.   PAIN MANAGMENT: No signs of pain.  DIET: NPO. Tube feed running at 60 ml/hr (goal rate), with 100 mL q 4 hrs flushed.  BOWEL/BLADDER: Incontinent B&B. Patients urinates and wets the diapers a lot.  ABNL LAB/BG: , 219.  DRAIN/DEVICES: L midline, WDL ex no blood return, triple lumen (white cap infusing NS TKO. Gray & red SL). GJ tube.   SKIN: Coccyx red blanchable and memo rash redness  TELEMETRY RHYTHM: NSR  TESTS/PROCEDURES:  D/C DAY/GOALS/PLACE: Pending improvement.  OTHER IMPORTANT INFO: Continues on IV dexamethasone, and scheduled nebs. Lungs sound clear, congested cough. Oral cares provided. Special & contact precautions maintained. Pulsate mattress in place.                 
Goal Outcome Evaluation:      Summary: transferred from ICU,  COVID.    DATE & TIME:  6/20/637651- 8023  Cognitive Concerns/ Orientation : HECTOR orientation. Dysphagia, aphasia.one to two words, and smiling.    BEHAVIOR & AGGRESSION TOOL COLOR: Green   ABNL VS/O2: VSS on RA   MOBILITY: Total cares, lift, T&R Q2h,   PAIN MANAGMENT: Denies  DIET: NPO, TF at 60mL/hr with 60mL q4h FWF  BOWEL/BLADDER: Incontinent. External cath in use.   ABNL LAB/BG: COVID + 6/16, CRP  87.5,Phos 1.8, replacement in progress, recheck tomorrow  DRAIN/DEVICES: L midline infusing , GJ tube for tube feedings  SKIN: Coccyx and memo rash, coccyx blanchable  TELEMETRY RHYTHM: NSR- SB  TESTS/PROCEDURES:    D/C DAY/GOALS/PLACE: Pending improvement.  OTHER IMPORTANT INFO: continues on IV abx. Special and contact precautions maintained.                
Goal Outcome Evaluation:      Summary: transferred from ICU,  COVID.    DATE & TIME: 6/21/22-6/22/22 6177-2071  Cognitive Concerns/ Orientation : Alert, tired this evening.  HECTOR orientation, occasionally yells out or says 1-2 words.   BEHAVIOR & AGGRESSION TOOL COLOR: Green   ABNL VS/O2: VSS on RA  MOBILITY: Total cares, up with lift, turn/repo q2hr.   PAIN MANAGMENT: Denies/No signs of pain.   DIET: NPO. Tube feed running at 60 ml/hr (goal rate), 60mL q4h water flush.   BOWEL/BLADDER: Incontinent. External cath in use. Large BM x1   ABNL LAB/BG: Phos recheck 2.4, replacement completed, recheck in am. , 284, 125. Calcium 8.4. ALT 80, AST 76. Hgb 11.1.   DRAIN/DEVICES: L midline, WDL ex no blood return, triple lumen (1 infusing, other saline locked). GJ tube, WDL. External catheter.   SKIN: Coccyx and memo rash/blanchable redness.   TELEMETRY RHYTHM: NSR  TESTS/PROCEDURES:  non scheduled.   D/C DAY/GOALS/PLACE: Pending improvement.  OTHER IMPORTANT INFO: Continues on IV Zosyn; vancomycin discontinued. Remdesivir completed; continues on IV dexamethasone, scheduled nebs. LS diminished, infrequent congested cough.                 
Goal Outcome Evaluation:      Summary: transferred from ICU,  COVID.    DATE & TIME: 6/23/22-6/24/22 0532-0697  Cognitive Concerns/ Orientation: Alert, HECTOR orientation.   BEHAVIOR & AGGRESSION TOOL COLOR: Green   ABNL VS/O2: VSS on room air. Congested cough, unable to clear throat. Oral cares provided during rounding and turning  MOBILITY: Total cares. Ax2 w/ lift. Turn & repos q2 hrs.   PAIN MANAGMENT: No signs of pain. Resting comfortably throughout the night.   DIET: NPO. Tube feed running at 60 ml/hr (goal rate), with 100 mL q 4 hrs flushed.  BOWEL/BLADDER: Incontinent B&B. External cath in place.  ABNL LAB/BG: bgm 316, 223, 105, 128  DRAIN/DEVICES: L midline, WDL ex no blood return, triple lumen (white cap infusing NS TKO. Gray & red SL). GJ tube. External catheter.   SKIN: Coccyx re blanchable.and memo rash redness  TELEMETRY RHYTHM: NSR  TESTS/PROCEDURES: AM Labs   D/C DAY/GOALS/PLACE: Pending improvement.  OTHER IMPORTANT INFO: Continues on IV dexamethasone, and scheduled nebs. Diminished lung sounds, congested cough. Oral cares provided. Special & contact precautions maintained.                 
Goal Outcome Evaluation:    Cognitive Concerns/ Orientation : HECTOR,  Mostly nonverbal but will shake head yes/no or thumbs up   BEHAVIOR & AGGRESSION TOOL COLOR: Green     ABNL VS/O2: VSS on room air  MOBILITY: Lift, assist x 2. Turned and repositioned. On pulsate mattress  PAIN MANAGMENT: Denied  DIET: Tube exchanged today, NPO, d/t J tube not flushing   BOWEL/BLADDER: Incontinent of B/B. External catheter on, No BM   ABNL LAB/BG:   DRAIN/DEVICES: GJ tube, PICC removed  TELEMETRY RHYTHM: NA  SKIN: Jessica area rash improving. Mepilex to coccyx  TESTS/PROCEDURES: NA  D/C DATE: For discharge today at 1030 am  OTHER IMPORTANT INFO: Special and contact precautions maintained                    
Goal Outcome Evaluation:    Cognitive Concerns/ Orientation: Alert, HECTOR orientation.   BEHAVIOR & AGGRESSION TOOL COLOR: Green   ABNL VS/O2: VSS on room air. Congested cough, unable to clear throat. Oral cares provided during rounding and turning  MOBILITY: Total cares. Ax2 w/ lift. Turn & repos q2 hrs.   PAIN MANAGMENT: No signs of pain. Resting comfortably throughout the night.   DIET: NPO. Tube feed running at 60 ml/hr (goal rate), with 100 mL q 4 hrs flushed.  BOWEL/BLADDER: Incontinent B&B. External cath in place.  ABNL LAB/BG: bgm 147, 230, 284  DRAIN/DEVICES: L midline, WDL ex no blood return, triple lumen (white cap infusing NS TKO. Gray & red SL). GJ tube. External catheter.   SKIN: Coccyx re blanchable.and memo rash redness  TELEMETRY RHYTHM: NSR  TESTS/PROCEDURES: AM Labs   D/C DAY/GOALS/PLACE: Pending improvement.  OTHER IMPORTANT INFO: Continues on IV dexamethasone, and scheduled nebs. Diminished lung sounds, congested cough. Oral cares provided. Special & contact precautions maintained. Updated mother this am.                       
Goal Outcome Evaluation:    Cognitive Concerns/ Orientation: Alert, HECTOR orientation. Speech garbled, can give thumbs up  BEHAVIOR & AGGRESSION TOOL COLOR: Green   ABNL VS/O2: VSS on room air. Loose congested cough. Unable to cough up phelgm. Oral cares provided during rounding.  MOBILITY: Total cares. Turn & reposition q2 hrs.   PAIN MANAGMENT: No signs of pain.  DIET: NPO. Tube feed running at 60 ml/hr (goal rate), with 100 mL q 4 hrs flushes  BOWEL/BLADDER: Incontinent B&B.   ABNL LAB/BG: BMG 85, 218, and 194  DRAIN/DEVICES: L PICC, WDL ex no blood return, triple lumen (white cap infusing NS TKO. Gray & red SL). GJ tube clogged after tube feeding ran out, able to unclog it after 10 minutes.   SKIN: Coccyx red blanchable and memo rash redness improving  TELEMETRY RHYTHM: NSR, discontinued  TESTS/PROCEDURES: N/A  D/C DAY/GOALS/PLACE:  Needs 3 more days of IV antibiotics  OTHER IMPORTANT INFO: Continues on IV dexamethasone, and scheduled nebs. Lungs sound are diminished. No sob. Oral cares provided. Mouth is very very dry-refused oral cares multiple times. Special & contact precautions maintained. Pulsate mattress in place.                    
Goal Outcome Evaluation:    DATE & TIME: 6/29/22 3831-7654   Cognitive Concerns/ Orientation : HECTOR orientation. Nonverbal, pt yells sounds and gives thumbs up.    BEHAVIOR & AGGRESSION TOOL COLOR: Green   ABNL VS/O2: VSS on RA  MOBILITY: Lift, fall risk. Reposition q2h.  PAIN MANAGMENT: No signs of pain  DIET: NPO. Tube feed infusing at 60cc/hr. H2O flushes 100cc q4h.  BOWEL/BLADDER: Incontinent of bowel and bladder.  ABNL LAB/BG: Mg 2.5/Phos 2.6 WBC 11.7/Hgb 11.5/, 193  DRAIN/DEVICES: PICC infusing, TKO. GJ tube.  SKIN: Coccyx redness blanchable. Jessica rash improving.  D/C DATE: Pt needs 3 more days of antibiotics  OTHER IMPORTANT INFO: Lung sounds diminished. Infrequent, dry cough. Continues on special precautions and pulsate mattress.                 
Goal Outcome Evaluation:    DATE & TIME: 6/30/22 0791-2233    Cognitive Concerns/ Orientation : HECTOR orientation. Pt mostly nonverbal sometimes gives one word answers and will give a thumbs up.   BEHAVIOR & AGGRESSION TOOL COLOR: Green   ABNL VS/O2: VSS on RA  MOBILITY: Lift, fall risk. Reposition q2h.  PAIN MANAGMENT: No signs of pain  DIET: NPO. Tube feed infusing at 60cc/hr. H2O flushes 100cc q4h.  BOWEL/BLADDER: Incontinent of bowel x1 and bladder. External catheter in place.  ABNL LAB/BG: WBC 9.5/BG 79, 170  Phosphorus 2.3- replacing   DRAIN/DEVICES: PICC infusing, TKO. GJ tube.  SKIN: Jessica rash improving. Coccyx redness blanchable.  D/C DATE: Plan for discharge on Friday after completion of IV antibiotics.  OTHER IMPORTANT INFO: Lung sounds clear/diminished. Infrequent, dry cough. Special precautions, pulsate mattress.                
Goal Outcome Evaluation:    Plan of Care Reviewed With: patient        Summary: transferred from ICU,  [facility].    DATE & TIME: 6/20/22 2029-7420  Cognitive Concerns/ Orientation : HECTOR orientation. Dysphagia, aphasia. Baseline yelling, one to two words, and smiling    BEHAVIOR & AGGRESSION TOOL COLOR: Green   ABNL VS/O2: VSS on RA, q4 VS  MOBILITY: Total cares, lift, T&R Q2h,   PAIN MANAGMENT: Denies  DIET: NPO, TF at 60mL/hr with 60mL q4h FWF  BOWEL/BLADDER: Incontinent. External cath in use.   ABNL LAB/BG: COVID + 6/16, CRP  87.5,Phos 1.8, replacement in progress, recheck this morning, ,309  DRAIN/DEVICES: L midline infusing , GJ tube for tube feedings  SKIN: Coccyx and emmo rash, coccyx blanchable  TELEMETRY RHYTHM: NSR  TESTS/PROCEDURES:  TBD  D/C DAY/GOALS/PLACE: Pending improvement.  OTHER IMPORTANT INFO: continues on IV abx. Special and contact precautions maintained.IV zosyn q6, IV vanco q18hr           
Goal Outcome Evaluation:    Plan of Care Reviewed With: patient, guardian, mother     Overall Patient Progress: declining    DATE & TIME: Admission 6/17/22 1600- 6/18/22 0730   Cognitive Concerns/ Orientation : HECTOR orientation. Dysphagia, aphasia. Baseline yelling, one to two words, and smiling until 0230 then decreased alertness. Slightly improved in AM  BEHAVIOR & AGGRESSION TOOL COLOR: Green   ABNL VS/O2: VSS on RA ex temp max 101.7 (PRN Tylenol given x3 and cold packs applied) and soft BP (lowest SBP in the 50s)  MOBILITY: Total cares, lift, T&R Q2h, hemiplegic  PAIN MANAGMENT: Denies  DIET: NPO, TF at 60mL/hr with 60mL q4h FWF  BOWEL/BLADDER: Incontinent. External cath in use. 1 large loose BM  ABNL LAB/BG: COVID + 6/16, Na 128, CRP 35.8->136, BNP 1263, D-dimer 1.49->1.07, Phos 1.8, Lactic 3.0 (recheck at 0630)  DRAIN/DEVICES: L midline infusing D5NS @100mL/hr, dressing changed  SKIN: Coccyx and memo rash, coccyx blanchable  TELEMETRY RHYTHM: ST  TESTS/PROCEDURES: CXR completed   D/C DAY/GOALS/PLACE: Pending improvement. Pt lives at home with mother/guardian, updated multiple times throughout shift   OTHER IMPORTANT INFO: Q24h rocephin and Remdesivir. Special and contact precautions maintained.      RRT called at 0230 for decreased alertness, low BP (SBP 50s and 70s), glossy/staring upward gaze, and wet sounding lungs. Solumedrol given, started on Solucortef taper. 500mL LR bolus given. CXR completed. Tele ordered. See house NP note.  
Goal Outcome Evaluation:    Shift: 1900 - 0730    Patient is nonverbal but does speak incomprehensible words at times. HECTOR orientation. VSS on RA. Pulses palpable to BUE & BLE. Bowel sounds active. Patient had tube feedings going at 60 ml/hr. 100 ml free water boluses every four hours. Patient on a pulsate mattress. PCD's on. Potassium & Phosphorus to be checked this morning per protocol. External catheter in place. Patient is voiding adequate urine output. PICC/Midline line is infusing NS at 10 ml/hr TKO. Red lumen has good blood return. Blood glucose levels stable. HOB at 30 degrees. Patient repositioned every two hours. Continuous pulse ox on. Patient unable to preform incentive spirometry. Mepilex applied to coccyx. Barrier cream applied to buttocks. Vancomycin & Zosyn infused overnight. Patient is up with 2 assist & lift. Patient is incontinent of bowel & bladder. Patient checked & changed during turns. Attempted oral cares but patient refused part way through. Patient is NPO no exceptions. Lung sounds diminished with infrequent cough. Patient will discharge home when medically stable. Will continue to monitor.    
Goal Outcome Evaluation:    Summary:   COVID.    DATE & TIME: 6/26/22   [id number]  Cognitive Concerns/ Orientation: Alert, HECTOR orientation. Speech garbled, can give thumbs up  BEHAVIOR & AGGRESSION TOOL COLOR: Green   ABNL VS/O2: VSS on room air. Congested cough, Oral cares provided during rounding which pt resists.  MOBILITY: Total cares. Turn & reposition q2 hrs.   PAIN MANAGMENT: No signs of pain.  DIET: NPO. Tube feed running earlier on in shift at 60 ml/hr (goal rate), with 100 mL q 4 hrs flushed.  G-J tube currently clamped as Jejunostomy port got clogged this evening.    BOWEL/BLADDER: Incontinent B&B. No BM this shift  ABNL LAB/BG: Albumin-2.7, ALT-75, AST-78, Hgb-12.3, BG-133, 195 & 205  DRAIN/DEVICES: L midline, WDL ex no blood return, triple lumen (white cap infusing NS TKO. Gray & red SL). GJ tube.   SKIN: Coccyx red blanchable and memo rash redness  TELEMETRY RHYTHM: NSR  TESTS/PROCEDURES: N/A  D/C DAY/GOALS/PLACE: Pending improvement.  OTHER IMPORTANT INFO: Continues on IV dexamethasone, and scheduled nebs. Lungs sound clear, congested cough. Oral cares provided. Special & contact precautions maintained. Pulsate mattress in place. Dosed with vanco for new MRSA.  On intermittent doses of IV Zosyn.  On 0.9% NS @ TKO.  Clog zapper to be attempted on PEG tube this evening.                         
Goal Outcome Evaluation:  DATE & TIME: 6/28/22 [id number]   Cognitive Concerns/ Orientation : HECTOR orientation. Nonverbal, pt yells sounds and gives thumbs up.    BEHAVIOR & AGGRESSION TOOL COLOR: Green   ABNL VS/O2: VSS on RA  MOBILITY: Lift, fall risk. Reposition q2h.  PAIN MANAGMENT: No signs of pain  DIET: NPO. Tube feed infusing at 60cc/hr. H2O flushes 100cc q4h.  BOWEL/BLADDER: Incontinent of bowel and bladder.  ABNL LAB/BG: Mg 2.5/Phos 2.2 (currently replacing, recheck in AM)/WBC 11.7/Hgb 11.5/BG 69/96/154  DRAIN/DEVICES: PICC infusing, TKO. GJ tube.  SKIN: Coccyx redness blanchable. Jessica rash improving.  D/C DATE: Pt needs 3 more days of antibiotics  OTHER IMPORTANT INFO: Lung sounds diminished. Infrequent, dry cough. Continues on special precautions and pulsate mattress.                    
[facility] Neuro: HECTOR orientation, pt non-verbal at baseline, pt does not follow commands. Pulm: Lungs: coarse/diminished throughout, on RA. GI/: BS present, no BM; External cath in place (condom cath): WDL output, urine incontinence, urine occurrences charted. Diet: continuous enteral feedings. Access: PICC x 3 lumens. No pain per rFLACC. Family updated over phone earlier--see my progress note. (MODIFIED) Airborne precautions maintained while in ICU-- Re-using PPE (PAPR) per [place] policy despite being over 2.5 years into pandemic and PAPR is one-time use per 3M     AM Shift:  - No significant changes, pt transferred up to [facility]    
Summary: transferred from ICU,  COVID.    DATE & TIME: 6/20/22 [id number]  Cognitive Concerns/ Orientation : EHCTOR orientation. Dysphagia, aphasia. Baseline yelling, one to two words, and smiling    BEHAVIOR & AGGRESSION TOOL COLOR: Green   ABNL VS/O2: VSS on RA, q4 VS  MOBILITY: Total cares, lift, T&R Q2h,   PAIN MANAGMENT: Denies  DIET: NPO, TF at 60mL/hr with 60mL q4h FWF  BOWEL/BLADDER: Incontinent. External cath in use. 1 large BM this shift  ABNL LAB/BG: COVID + 6/16, CRP  87.5,Phos 1.8, replacement in progress, recheck tomorrow  DRAIN/DEVICES: L midline infusing , GJ tube for tube feedings  SKIN: Coccyx and memo rash, coccyx blanchable  TELEMETRY RHYTHM: NSR  TESTS/PROCEDURES:  TBD  D/C DAY/GOALS/PLACE: Pending improvement.  OTHER IMPORTANT INFO: continues on IV abx. Special and contact precautions maintained.IV zosyn q6, IV vanco q18hr    
Unable to assess orientation- Pt is nonverbal d/t previous TBI. On Vanco and Zosyn. Midline in L arm. Blanchable redness to coccyx. Tele is SR. T&R. TF running at 60mL/hr. Refusing oral cares all day. Voids often- multiple linen changes. Spoke to and updated Pt's guardian (mother) this shift.   
VSS, HR hector at times, tele: NSR. HECTOR orientation, nonverbal. Turn and reposition every 2 hrs, total cares. Denies pain. Tube feeding running at goal. Non-productive cough. NPO, .   
Detail Level: Generalized
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
## 2024-01-02 NOTE — PLAN OF CARE
Goal Outcome Evaluation:       Cognitive Concerns/ Orientation: Patient is alert and oriented x4, illogical speech but does seem to understand; Calm and cooperative.   BEHAVIOR & AGGRESSION TOOL COLOR: Green   ABNL VS/O2: VSS on RA  MOBILITY: Assist of two to turn and reposition every 2 hours.  Up with lift   PAIN MANAGMENT: Denies  DIET: Continuous tube feeding at goal rate of 55 ml/hr, with 30 mL Q4hr FWF- tolerating.   BOWEL/BLADDER: Incontinent of B&B, external catheter in place/ good UOP  ABNL LAB/BG:  and 168. K+ 2.9, replaced and recheck due at 0820.  Phos 2.2, replaced and recheck 1/3 am  DRAIN/DEVICES: G/J tube with continuous feeds in J port and gravity drain attached to gastric port to help alleviate nausea (per mother) Peripheral IV right arm SL with intermittent antibiotics.   TELEMETRY RHYTHM: NA  SKIN: Sacrum and perineal redness/abrasions, mepilex removed per mother request, barrier cream applied per request. Scattered bruises.  TESTS/PROCEDURES: None this shift.   D/C DAY/GOALS/PLACE: Once off IV antibiotics, clinical improvement   OTHER IMPORTANT INFO: Patient has as a fernando on L hand due to him pulling at his line and tubes.

## 2024-01-02 NOTE — CONSULTS
"ED Provider Note    CHIEF COMPLAINT  Chief Complaint   Patient presents with    Neck Pain     Right sided neck pain with sudden onset when he woke up today. Pt states that he felt a \"crack\" then the pain started.        EXTERNAL RECORDS REVIEWED  Inpatient dental extraction 12/13/2017    HPI/SCARLETT Lee Debbie Alvarez is a 17 y.o. male who presents for evaluation of right-sided neck pain.  This morning when he woke up he states he felt a crack in his neck and has since had pain.  No weakness or numbness.  No fever.  No history of this.  Denies any midline neck or back pain.    PAST MEDICAL HISTORY   has a past medical history of Cold (11/14/2017), Dental disorder, FTND (full term normal delivery) (2008), Urinary bladder disorder, and UTI (urinary tract infection) (2013).    SURGICAL HISTORY   has a past surgical history that includes dental extraction(s) (N/A, 12/13/2017).    FAMILY HISTORY  History reviewed. No pertinent family history.    SOCIAL HISTORY  Social History     Tobacco Use    Smoking status: Never    Smokeless tobacco: Never   Vaping Use    Vaping Use: Never used   Substance and Sexual Activity    Alcohol use: Never    Drug use: Never    Sexual activity: Not on file       CURRENT MEDICATIONS  Home Medications       Reviewed by Julia Austin R.N. (Registered Nurse) on 06/27/23 at 1300  Med List Status: Not Addressed     Medication Last Dose Status        Patient Len Taking any Medications                           ALLERGIES  No Known Allergies    PHYSICAL EXAM  VITAL SIGNS: BP (!) 125/92   Pulse 68   Temp 36.4 °C (97.6 °F) (Temporal)   Resp 20   Ht 1.68 m (5' 6.14\")   Wt 72.3 kg (159 lb 6.3 oz)   SpO2 95%   BMI 25.62 kg/m²    Constitutional: Alert in no apparent distress.  HENT: No signs of significant acute trauma.   Neck: Pain localizes to the right trapezius muscle although there is no tenderness on exam.  No midline step-offs or deformities, no midline " "[facility] Nurse Inpatient Assessment     Consulted for: Wound coccyx, heel     Summary: patient with POA skin injuries to buttock and heel, both present on previous admission, stable 1/2    Patient History (according to provider note(s):      \"63 year old male admitted on 12/29/2023 who is being transferred out of the ICU on 12/31/2023. 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 Pt with septic physiology in ED and CT Chest with multifocal airspace disease with RLL predominance compatible with aspiration. Pt initially not responsive to fluids thus pressors started with stress dose steroids in addition to abx. Able to wean off pressors overnight 12/30 - 12/31. Now hemodynamically stable. Breathing comfortably on nasal cannula. Plan to transfer patient to sixth floor with telemetry monitoring. Will wean off stress dosed steroids in coming days. Starting d5w for mild hypernatremia to 148. His mother and care taker was informed of his transfer out of the icu. Hospitalist team to take over as primary.  \"    Assessment:      Areas visualized during today's visit: Focused:, Sacrum/coccyx, and Lower extremities     Wound location: buttock sacrum     Last photo: 1/2  Wound due to: Moisture Associated Skin Damage (MASD) and pressure   Wound history/plan of care: found with criticaid paste   Wound base:  adhered devitalized skin, epidermis and " "dermis,  blanchable , non-blanchable, and erythema     Palpation of the wound bed: normal      Drainage: none     Description of drainage: none     Measurements (length x width x depth, in cm): without measurable open area noted       Tunneling: N/A     Undermining: N/A  Periwound skin: Dry/scaly and Erythema- blanchable      Color: pink and red      Temperature: normal   Odor: none  Pain: no grimacing or signs of discomfort, none  Pain interventions prior to dressing change: patient tolerated well  Treatment goal: Heal  and Protection  STATUS: initial assessment this admission   Supplies ordered: gathered, at bedside, and supplies stored on unit    Wound location: right heel     Last photo: 1/2  Wound due to: Pressure Injury unstagable   Wound history/plan of care: mepilex 4x4\" in use   Wound base:  eschar     Palpation of the wound bed: firm      Drainage: none     Description of drainage: none     Measurements (length x width x depth, in cm): 2.5  x 2.5  x  0 cm      Tunneling: N/A     Undermining: N/A  Periwound skin: Intact and Dry/scaly      Color: normal and consistent with surrounding tissue      Temperature: normal   Odor: none  Pain: no grimacing or signs of discomfort, none  Pain interventions prior to dressing change: patient tolerated well  Treatment goal: Protection  STATUS: initial assessment  Supplies ordered: supplies stored on unit and discussed with RN      Treatment Plan:       Wound care  DAILY        Comments: Location: right medial heel  Care: provided daily by primary RN  1. Cleanse daily with normal saline and 4x4\" guaze, pat dry  2. Apply skin prep sure prep or 3M no sting skin barrier film to blister and surrounding skin, let dry  3. Cover with 4x4\" mepilex dressing. Ok to lift and reapply after routine assessments. Ok to use each mepilex up to 5 days if not saturated with drainage touching three corners.  4. Apply prevalon heel boots per manufacture instructions while in bed and send home " tenderness.  Eyes: Conjunctiva normal, non-icteric.   Chest: Normal nonlabored respirations  Skin: No appreciable rash on the exposed skin  Musculoskeletal: No obvious acute trauma appreciated  Neurologic: Alert, no obvious focal deficits noted.        DIAGNOSTIC STUDIES / PROCEDURES    RADIOLOGY  I have independently interpreted the diagnostic imaging associated with this visit and am waiting the final reading from the radiologist.   My preliminary interpretation is as follows: No fracture or malalignment  Radiologist interpretation:   DX-CERVICAL SPINE-2 OR 3 VIEWS   Final Result      Negative cervical spine series.            COURSE & MEDICAL DECISION MAKING    ED Observation Status? No; Patient does not meet criteria for ED Observation.     INITIAL ASSESSMENT, COURSE AND PLAN  Care Narrative: 17-year-old with essentially atraumatic right-sided neck pain localizing to the region of the trapezius muscle, no focal neurologic complaints or findings, x-rays obtained revealing no obvious bony abnormalities.  To help increase compliance I will prescribe ibuprofen.  Additionally, I recommended heat, stretching and massage.  Discharged home in stable condition    FINAL DIAGNOSIS  1. Strain of right trapezius muscle, initial encounter    2. Muscle spasms of neck           Electronically signed by: Christian House M.D., 6/27/2023 2:02 PM       "with patient on discharge          Wound care  EVERY SHIFT        Comments: Location: buttock  Care: provided qshift by primary RN  1. Cleanse with each incontinence episode with sarah cleanse and protect spray and soft dry wipes. No need to remove all old triad paste, just wipe off soiled top layer and reapply more (use mineral oil stocked in the pharmacy if complete removal is desired)  2. Apply Triad paste (#[id number]) to buttock and perianal  3. Continue to use primofit and cardinal covidien pads for incontinence, do not apply diapers when in bed          Skin care precautions  EFFECTIVE NOW        Comments: Pressure Injury Prevention (PIP) Plan:  If patient is declining pressure injury prevention interventions: Explore reason why and address patient's concerns, Educate on pressure injury risk and prevention intervention(s), If patient is still declining, document \"informed refusal\" , and Ensure Care team is aware ( provider, charge nurse, etc)  Mattress: Follow bed algorithm, reassess daily and order specialty mattress, if indicated.  HOB: Maintain at or below 30 degrees, unless contraindicated  Repositioning in bed: Every 1-2 hours , Left/right positioning; avoid supine, and Raise foot of bed prior to raising head of bed, to reduce patient sliding down (shear)  Heels: Keep elevated off mattress, Pillows under calves, Heel lift boots, and Primary RN to obtain Prevalon heel boots from  #[id number] order 2 boots one for each foot and apply when in bed  Protective Dressing: Triad paste to sacrum ( #[id number])  Positioning Equipment: Z-Aguila positioner (#[id number]-medium or #[id number]-large) to help maintain side lying position.  Chair positioning: Chair cushion (#[id number]) , Assist patient to reposition hourly, and Do NOT use a donut for sitting (this increases pressure to smaller area and creates a higher potential for injury)    If patient has a buttock pressure injury, or high risk for PI use chair cushion or SPS.  Moisture " Management: Perineal cleansing /protection: Follow Incontinence Protocol, Avoid brief in bed, Clean and dry skin folds with bathing , Consider InterDry (#074692) between folds, and Moisturize dry skin  Under Devices: Inspect skin under all medical devices during skin inspection , Ensure tubes are stabilized without tension, and Ensure patient is not lying on medical devices or equipment when repositioned  Ask provider to discontinue device when no longer needed.        Orders: Written    RECOMMEND PRIMARY TEAM ORDER: None, at this time  Education provided: importance of repositioning, plan of care, Moisture management, Hygiene, and Off-loading pressure  Discussed plan of care with: Patient and Nurse  [facility] nurse follow-up plan: weekly  Notify [facility] if wound(s) deteriorate.  Nursing to notify the Provider(s) and re-consult the [facility] Nurse if new skin concern.    DATA:     Current support surface: Standard  Low air loss (ZULY pump, Isolibrium, Pulsate, skin guard, etc)  Containment of urine/stool: Incontinence Protocol and Incontinent pad in bed  BMI: Body mass index is 21.67 kg/m .   Active diet order: Orders Placed This Encounter      NPO for Medical/Clinical Reasons Except for: NPO but receiving Tube Feeding     Output: I/O last 3 completed shifts:  In: 2769 [I.V.:450; NG/GT:2319]  Out: 1625 [Urine:1625]     Labs:   Recent Labs   Lab 01/01/24  2311   ALBUMIN 2.8*   HGB 9.1*   WBC 5.9     Pressure injury risk assessment:   Sensory Perception: 3-->slightly limited  Moisture: 3-->occasionally moist  Activity: 2-->chairfast  Mobility: 2-->very limited  Nutrition: 3-->adequate  Friction and Shear: 2-->potential problem  Ricci Score: 15    Shellie CWOCN   1st choice: Securely message with [employer] ([facility] [employer])   (2nd option: [facility] Office Phone [phone], messages checked periodically Mon-Fri 8a-4p)

## 2024-01-02 NOTE — PLAN OF CARE
Goal Outcome Evaluation:      Plan of Care Reviewed With: patient  Summary: 63 year old transferred from ICU 12/31 - history of TBI admitted for dyspnea and elevated respiratory rate as well as positive Covid.     DATE & TIME: 01/01/2024, 3812-5471  Cognitive Concerns/ Orientation: Patient is alert and oriented x4- can't speak much but does understand; Calm and cooperative.   BEHAVIOR & AGGRESSION TOOL COLOR: Green  ABNL VS/O2: VSS on RA, sats 98%  MOBILITY: A2, lift. T&R q2h- baseline  PAIN MANAGMENT: Denies  DIET: Continuous tube feeding at goal rate of 55 ml/hr, with 30 mL Q4hr FWF- tolerating.   BOWEL/BLADDER: Incontinent of B&B, external catheter in place/ good UOP  ABNL LAB/BG: BG= 149, 140  DRAIN/DEVICES: PIV, G/J tube with continuous feeds in J port and  gravity drain attached to gastric port to help alleviate nausea (per mother)  TELEMETRY RHYTHM: NA  SKIN: Sacrum and perineal redness/abrasions, mepilex removed per mother request, barrier cream applied per request. Scattered bruises.  TESTS/PROCEDURES: None this shift.   D/C DAY/GOALS/PLACE: Once off IV antibiotics, clinical improvement   OTHER IMPORTANT INFO: Pt. Has a fernando on L hand due to him pulling at his lines; mom Savannah visited and updated.

## 2024-01-02 NOTE — DISCHARGE INSTRUCTIONS
"  Wound care  DAILY        Comments: Location: right medial heel  Care: provided daily by primary RN  1. Cleanse daily with normal saline and 4x4\" guaze, pat dry  2. Apply skin prep sure prep or 3M no sting skin barrier film to blister and surrounding skin, let dry  3. Cover with 4x4\" mepilex dressing. Ok to lift and reapply after routine assessments. Ok to use each mepilex up to 5 days if not saturated with drainage touching three corners.  4. Apply prevalon heel boots per manufacture instructions while in bed and send home with patient on discharge          Wound care  EVERY SHIFT        Comments: Location: buttock  Care: provided qshift by primary RN  1. Cleanse with each incontinence episode with sarah cleanse and protect spray and soft dry wipes. No need to remove all old triad paste, just wipe off soiled top layer and reapply more (use mineral oil stocked in the pharmacy if complete removal is desired)  2. Apply Triad paste (#[id number]) to buttock and perianal  3. Continue to use primofit and cardinal covidien pads for incontinence, do not apply diapers when in bed        "

## 2024-01-03 LAB
CREAT SERPL-MCNC: 0.71 MG/DL (ref 0.67–1.17)
EGFRCR SERPLBLD CKD-EPI 2021: >90 ML/MIN/1.73M2
GLUCOSE BLDC GLUCOMTR-MCNC: 108 MG/DL (ref 70–99)
GLUCOSE BLDC GLUCOMTR-MCNC: 119 MG/DL (ref 70–99)
GLUCOSE BLDC GLUCOMTR-MCNC: 131 MG/DL (ref 70–99)
GLUCOSE BLDC GLUCOMTR-MCNC: 140 MG/DL (ref 70–99)
GLUCOSE BLDC GLUCOMTR-MCNC: 148 MG/DL (ref 70–99)
GLUCOSE BLDC GLUCOMTR-MCNC: 180 MG/DL (ref 70–99)
MAGNESIUM SERPL-MCNC: 2 MG/DL (ref 1.7–2.3)
PHOSPHATE SERPL-MCNC: 2.2 MG/DL (ref 2.5–4.5)
POTASSIUM SERPL-SCNC: 3.4 MMOL/L (ref 3.4–5.3)
POTASSIUM SERPL-SCNC: 3.9 MMOL/L (ref 3.4–5.3)

## 2024-01-03 PROCEDURE — 250N000013 HC RX MED GY IP 250 OP 250 PS 637: Performed by: INTERNAL MEDICINE

## 2024-01-03 PROCEDURE — 83735 ASSAY OF MAGNESIUM: CPT | Performed by: HOSPITALIST

## 2024-01-03 PROCEDURE — 250N000011 HC RX IP 250 OP 636: Performed by: STUDENT IN AN ORGANIZED HEALTH CARE EDUCATION/TRAINING PROGRAM

## 2024-01-03 PROCEDURE — 84132 ASSAY OF SERUM POTASSIUM: CPT | Performed by: HOSPITALIST

## 2024-01-03 PROCEDURE — 250N000013 HC RX MED GY IP 250 OP 250 PS 637: Performed by: STUDENT IN AN ORGANIZED HEALTH CARE EDUCATION/TRAINING PROGRAM

## 2024-01-03 PROCEDURE — 250N000013 HC RX MED GY IP 250 OP 250 PS 637

## 2024-01-03 PROCEDURE — 250N000013 HC RX MED GY IP 250 OP 250 PS 637: Performed by: HOSPITALIST

## 2024-01-03 PROCEDURE — 82565 ASSAY OF CREATININE: CPT | Performed by: STUDENT IN AN ORGANIZED HEALTH CARE EDUCATION/TRAINING PROGRAM

## 2024-01-03 PROCEDURE — 36415 COLL VENOUS BLD VENIPUNCTURE: CPT | Performed by: HOSPITALIST

## 2024-01-03 PROCEDURE — 120N000001 HC R&B MED SURG/OB

## 2024-01-03 PROCEDURE — 99233 SBSQ HOSP IP/OBS HIGH 50: CPT | Performed by: HOSPITALIST

## 2024-01-03 PROCEDURE — 84100 ASSAY OF PHOSPHORUS: CPT | Performed by: HOSPITALIST

## 2024-01-03 PROCEDURE — 36415 COLL VENOUS BLD VENIPUNCTURE: CPT | Performed by: STUDENT IN AN ORGANIZED HEALTH CARE EDUCATION/TRAINING PROGRAM

## 2024-01-03 PROCEDURE — 250N000011 HC RX IP 250 OP 636: Performed by: HOSPITALIST

## 2024-01-03 RX ORDER — POTASSIUM CHLORIDE 20MEQ/15ML
40 LIQUID (ML) ORAL ONCE
Status: COMPLETED | OUTPATIENT
Start: 2024-01-03 | End: 2024-01-03

## 2024-01-03 RX ORDER — LEVOFLOXACIN 750 MG/1
750 TABLET, FILM COATED ORAL DAILY
Status: DISCONTINUED | OUTPATIENT
Start: 2024-01-03 | End: 2024-01-03

## 2024-01-03 RX ADMIN — Medication 60 ML: at 13:21

## 2024-01-03 RX ADMIN — POTASSIUM & SODIUM PHOSPHATES POWDER PACK 280-160-250 MG 1 PACKET: 280-160-250 PACK at 13:48

## 2024-01-03 RX ADMIN — LEVOFLOXACIN 750 MG: 750 TABLET, FILM COATED ORAL at 20:36

## 2024-01-03 RX ADMIN — BRIVARACETAM 100 MG: 10 SOLUTION ORAL at 20:36

## 2024-01-03 RX ADMIN — BRIVARACETAM 100 MG: 10 SOLUTION ORAL at 09:27

## 2024-01-03 RX ADMIN — INSULIN ASPART 1 UNITS: 100 INJECTION, SOLUTION INTRAVENOUS; SUBCUTANEOUS at 04:29

## 2024-01-03 RX ADMIN — POTASSIUM & SODIUM PHOSPHATES POWDER PACK 280-160-250 MG 1 PACKET: 280-160-250 PACK at 18:38

## 2024-01-03 RX ADMIN — CARBAMAZEPINE 150 MG: 100 SUSPENSION ORAL at 13:20

## 2024-01-03 RX ADMIN — Medication 40 MG: at 06:49

## 2024-01-03 RX ADMIN — HYDROCORTISONE SODIUM SUCCINATE 50 MG: 100 INJECTION, POWDER, FOR SOLUTION INTRAMUSCULAR; INTRAVENOUS at 09:27

## 2024-01-03 RX ADMIN — INSULIN ASPART 1 UNITS: 100 INJECTION, SOLUTION INTRAVENOUS; SUBCUTANEOUS at 00:53

## 2024-01-03 RX ADMIN — HEPARIN SODIUM 5000 UNITS: 5000 INJECTION, SOLUTION INTRAVENOUS; SUBCUTANEOUS at 21:54

## 2024-01-03 RX ADMIN — VANCOMYCIN HYDROCHLORIDE 1000 MG: 1 INJECTION, SOLUTION INTRAVENOUS at 00:53

## 2024-01-03 RX ADMIN — HEPARIN SODIUM 5000 UNITS: 5000 INJECTION, SOLUTION INTRAVENOUS; SUBCUTANEOUS at 06:48

## 2024-01-03 RX ADMIN — CYANOCOBALAMIN TAB 1000 MCG 2500 MCG: 1000 TAB at 09:24

## 2024-01-03 RX ADMIN — POTASSIUM CHLORIDE 40 MEQ: 20 SOLUTION ORAL at 16:21

## 2024-01-03 RX ADMIN — POTASSIUM & SODIUM PHOSPHATES POWDER PACK 280-160-250 MG 1 PACKET: 280-160-250 PACK at 11:41

## 2024-01-03 RX ADMIN — LEVOTHYROXINE SODIUM 125 MCG: 100 TABLET ORAL at 09:25

## 2024-01-03 RX ADMIN — Medication 50 MCG: at 09:24

## 2024-01-03 RX ADMIN — CARBAMAZEPINE 150 MG: 100 SUSPENSION ORAL at 00:56

## 2024-01-03 RX ADMIN — FLUDROCORTISONE ACETATE 0.1 MG: 0.1 TABLET ORAL at 09:24

## 2024-01-03 RX ADMIN — CARBAMAZEPINE 100 MG: 100 SUSPENSION ORAL at 18:38

## 2024-01-03 RX ADMIN — OXYCODONE HYDROCHLORIDE AND ACETAMINOPHEN 1000 MG: 500 TABLET ORAL at 09:25

## 2024-01-03 RX ADMIN — CARBAMAZEPINE 150 MG: 100 SUSPENSION ORAL at 06:49

## 2024-01-03 RX ADMIN — HEPARIN SODIUM 5000 UNITS: 5000 INJECTION, SOLUTION INTRAVENOUS; SUBCUTANEOUS at 13:29

## 2024-01-03 RX ADMIN — INSULIN ASPART 1 UNITS: 100 INJECTION, SOLUTION INTRAVENOUS; SUBCUTANEOUS at 13:21

## 2024-01-03 ASSESSMENT — ACTIVITIES OF DAILY LIVING (ADL)
ADLS_ACUITY_SCORE: 71
ADLS_ACUITY_SCORE: 67
ADLS_ACUITY_SCORE: 71
ADLS_ACUITY_SCORE: 67
DEPENDENT_IADLS:: CLEANING;COOKING;LAUNDRY;SHOPPING;MEAL PREPARATION;MEDICATION MANAGEMENT;MONEY MANAGEMENT;TRANSPORTATION;INCONTINENCE
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
## 2024-01-03 NOTE — PLAN OF CARE
Summary: Aspiration  PNA/COVID. Hx TBI with rashidian  DATE & TIME: 1/2/2024, 1930 - 0730  Cognitive Concerns/ Orientation: HECTOR, speech very garbled/almost nonverbal.  Does seem to understand and follows some commands  BEHAVIOR & AGGRESSION TOOL COLOR: Green, calm/cooperative   ABNL VS/O2: VSS on RA  MOBILITY:  Up with lift (baseline). Right sided hemiparesis. Turned and repositioned   PAIN MANAGMENT: Denies/no nonverbal indicators of pain  DIET: Continuous tube feeding at goal rate of 55 ml/hr, with water flushes of 30 mL Q4hr,  tolerating.   BOWEL/BLADDER: Incontinent of B&B, external catheter in place/ good UOP, No BM this shift  ABNL LAB/BG: /180/140   DRAIN/DEVICES: G/J tube with continuous feeds via J port and gravity drain attached to gastric port to help alleviate nausea (per mother) with brown/bile output. Peripheral IV right arm SL with intermittent antibiotics.   TELEMETRY RHYTHM: NA  SKIN: Sacrum and perineal redness/abrasions/escoriation, wound care to buttocks done per WOC orders.  Scattered bruises. Right heel scabbed old blister, mepilex in place. Prevalon boots applied. +2 BLE edema. Refusing oral cares  TESTS/PROCEDURES: None this shift.   D/C DAY/GOALS/PLACE: Once off IV antibiotics, pending clinical improvement 1-2 days to home with mom  OTHER IMPORTANT INFO: Patient has as a mitt on left hand due to him pulling at his line and tubes- restraint order active. Will need to be renewed this AM at 0858. Contact precautions maintained    Goal Outcome Evaluation:      Plan of Care Reviewed With: patient    Overall Patient Progress: improvingOverall Patient Progress: improving

## 2024-01-03 NOTE — PROGRESS NOTES
CLINICAL NUTRITION SERVICES - REASSESSMENT NOTE    RECOMMENDATIONS FOR MD/PROVIDER TO ORDER:   - Phos tends to trend low, consider TID NeutraPhos replacements. Do not suspect related to refeeding. Pt is already on the highest phos-containing formula.        Ordered by RD:   Increase flushes to better meet hydration needs - 160 mL q 4 hours.     Total (TF + Flushes) = 1880 mL free water    Malnutrition:   % Weight Loss:  None noted  % Intake:  No decreased intake noted - wt does fluctuate some  Subcutaneous Fat Loss:  (12/18 NFPA)) None observed   Muscle Loss:  (12/18 NFPA)) None observed   Fluid Retention:  None noted     Malnutrition Diagnosis: Patient does not meet two of the above criteria necessary for diagnosing malnutrition     EVALUATION OF PROGRESS TOWARD GOALS   Diet: NPO    Nutrition Support:     Nutrition Support Enteral:  Type of Feeding Tube: GJT (feed via Jejunostomy)   Enteral Frequency:  Cycled x 22 hours (held 1 hour pre and post levothyroxine)  Enteral Regimen: Jevity 1.5 @ 55 x 22 hours   Modulars: Expedite Bottle Daily   Total Enteral Provisions (TF + Expedite): 1915 kcal (26 kcal/kg), 87 g pro (1.2 g/kg), 25 g fiber, 920 mL free water  Free Water Flush: 60 mL q 4 hours     Intake/Tolerance:   - TF reached goal on 1/1.   - Labs:   K/Mg NL  Phos 2.2 (L)  - Stooling: BM x1 daily.  - Weight trends: weight tends to fluctuate from the 150s-160s. Do not anticipate true significant weight shifts. Note current wt is consistent with weight measured 12/01.   Wt Readings from Last 5 Encounters:   12/30/23 68.5 kg (151 lb 0.2 oz)   12/21/23 75.3 kg (166 lb 0.1 oz)   12/01/23 69.8 kg (153 lb 14.1 oz)   11/26/23 74.8 kg (165 lb)   11/07/23 74.8 kg (165 lb)     - Meds:   Vitamin B12, Vitamin D, Vitamin C  Expedite wound healing modular   Med sliding scale insulin   Levothyroxine @ 0800  Reglan on hold    ASSESSED NUTRITION NEEDS:  Dosing Weight 72.6 kg  Estimated Energy Needs: 5927-3162 kcals (25-30  Kcal/Kg)  Justification: maintenance  Estimated Protein Needs:  grams protein (1.2-1.5 g pro/Kg)  Justification: wounds    NEW FINDINGS:   - Transferred up to st [facility] on 1/1.   - WOCN 1/2:  Buttock/sacrum: MASD, pressure  Right heel: pressure injury, unstageable     Previous Goals:   EN to meet % estimated needs.  Evaluation: Met since 1/1    Previous Nutrition Diagnosis:   Inadequate protein-energy intake related to NPO status and EN has not resumed as evidenced by pt not meeting nutrition needs   Evaluation: Completed    CURRENT NUTRITION DIAGNOSIS  No nutrition diagnosis identified at this time     INTERVENTIONS  Recommendations / Nutrition Prescription  Continue TF + Expedite wound healing modular as ordered  Increase flushes to better meet hydration needs - 160 mL q 4 hours.     Total (TF + Flushes) = 1880 mL free water     Implementation  Feeding Tube Flush    Goals  TF +  Expedite to meet % estimated needs.     MONITORING AND EVALUATION:  Progress towards goals will be monitored and evaluated per protocol and Practice Guidelines    Marisel Fernández RD, LD  Pager: [phone]  Weekend Pager: [phone]

## 2024-01-03 NOTE — PLAN OF CARE
Summary: Asp PNA/COVID. Hx TBI with jude  DATE & TIME: 1/2/2024 2399-5590  Cognitive Concerns/ Orientation: HECTOR illogical speech/almost nonverbal, but does seem to understand and follows some commands  BEHAVIOR & AGGRESSION TOOL COLOR: Green, calm/cooperative   ABNL VS/O2: VSS on RA  MOBILITY: Assist of 2 to turn and reposition every 2 hours.  Up with lift (baseline). Right sided hemiparesis.   PAIN MANAGMENT: Denies/no nonverbal s/s of pain  DIET: Continuous tube feeding at goal rate of 55 ml/hr, with 30 mL Q4hr FWF- tolerating.   BOWEL/BLADDER: Incontinent of B&B, external catheter in place/ good UOP, BM soft today.  ABNL LAB/BG: BG 1117, 182, 147. K recheck 4.1, Mg 2.0, Phos 2.6- all rechecks in AM 1/3  DRAIN/DEVICES: G/J tube with continuous feeds in J port and gravity drain attached to gastric port to help alleviate nausea (per mother) with brown/bile output. Peripheral IV right arm SL with intermittent antibiotics.   TELEMETRY RHYTHM: NA  SKIN: Sacrum and perineal redness/abrasions/escoriation, sarah cleanse and triad paste applied, see WOC orders.  Scattered bruises. Right heel scabbed old blister, mepilex in place. Prevalon boots applied. +2 BLE edema. Oral cares, mouth with scabs/thick secretions   TESTS/PROCEDURES: None this shift.   D/C DAY/GOALS/PLACE: Once off IV antibiotics, clinical improvement 1-2 days to home with mom  OTHER IMPORTANT INFO: Patient has as a fernando on L hand due to him pulling at his line and tubes- restraint order active.

## 2024-01-03 NOTE — PLAN OF CARE
Goal Outcome Evaluation:  DATE & TIME: 1/3/2023 2716-6406  Cognitive Concerns/ Orientation: HECTOR, speech very garbled/almost nonverbal.  Does seem to understand and follows some commands  BEHAVIOR & AGGRESSION TOOL COLOR: Green, calm/cooperative   ABNL VS/O2: VSS on RA  MOBILITY:  Up with lift (baseline). Right sided hemiparesis. Turned and repositioned   PAIN MANAGMENT: Denies/no nonverbal indicators of pain  DIET: Continuous tube feeding at goal rate of 55 ml/hr, with water flushes of 160 mL Q4hr,  tolerating.   BOWEL/BLADDER: Incontinent of B&B, external catheter in place/ good UOP, No BM this shift  ABNL LAB/BG:  and 148, Phos 2.2.  Replacement started.  Recheck in AM  DRAIN/DEVICES: G/J tube with continuous feeds via J port and gravity drain attached to gastric port to help alleviate nausea (per mother) with brown/bile output. Peripheral IV right arm SL with intermittent antibiotics.   TELEMETRY RHYTHM: NA  SKIN: Sacrum and perineal redness/abrasions/escoriation, wound care to buttocks done per WOC orders.  Scattered bruises. Right heel scabbed old blister, mepilex changed. Mepilex on rt knee abrasion placed. Prevalon boots applied. +2 BLE edema. Refusing oral cares  TESTS/PROCEDURES: None this shift.   D/C DAY/GOALS/PLACE: Tomorrow to home

## 2024-01-04 ENCOUNTER — TELEPHONE (OUTPATIENT)
Dept: FAMILY MEDICINE | Facility: CLINIC | Age: 64
End: 2024-01-04
Payer: MEDICARE

## 2024-01-04 ENCOUNTER — MEDICAL CORRESPONDENCE (OUTPATIENT)
Dept: HEALTH INFORMATION MANAGEMENT | Facility: CLINIC | Age: 64
End: 2024-01-04

## 2024-01-04 VITALS
WEIGHT: 151.01 LBS | SYSTOLIC BLOOD PRESSURE: 132 MMHG | DIASTOLIC BLOOD PRESSURE: 68 MMHG | OXYGEN SATURATION: 99 % | BODY MASS INDEX: 21.67 KG/M2 | TEMPERATURE: 97.4 F | RESPIRATION RATE: 18 BRPM | HEART RATE: 55 BPM

## 2024-01-04 DIAGNOSIS — G82.20 PARAPLEGIA (H): Primary | ICD-10-CM

## 2024-01-04 DIAGNOSIS — G81.01 FLACCID HEMIPLEGIA AFFECTING RIGHT DOMINANT SIDE, UNSPECIFIED ETIOLOGY (H): ICD-10-CM

## 2024-01-04 DIAGNOSIS — Z93.1 G TUBE FEEDINGS (H): ICD-10-CM

## 2024-01-04 LAB
BACTERIA BLD CULT: NO GROWTH
BACTERIA BLD CULT: NO GROWTH
CREAT SERPL-MCNC: 0.75 MG/DL (ref 0.67–1.17)
EGFRCR SERPLBLD CKD-EPI 2021: >90 ML/MIN/1.73M2
ERYTHROCYTE [DISTWIDTH] IN BLOOD BY AUTOMATED COUNT: 13.5 % (ref 10–15)
GLUCOSE BLDC GLUCOMTR-MCNC: 109 MG/DL (ref 70–99)
GLUCOSE BLDC GLUCOMTR-MCNC: 119 MG/DL (ref 70–99)
GLUCOSE BLDC GLUCOMTR-MCNC: 84 MG/DL (ref 70–99)
HCT VFR BLD AUTO: 31.7 % (ref 40–53)
HGB BLD-MCNC: 10.3 G/DL (ref 13.3–17.7)
MCH RBC QN AUTO: 29.5 PG (ref 26.5–33)
MCHC RBC AUTO-ENTMCNC: 32.5 G/DL (ref 31.5–36.5)
MCV RBC AUTO: 91 FL (ref 78–100)
PHOSPHATE SERPL-MCNC: 2.6 MG/DL (ref 2.5–4.5)
PLATELET # BLD AUTO: 234 10E3/UL (ref 150–450)
RBC # BLD AUTO: 3.49 10E6/UL (ref 4.4–5.9)
WBC # BLD AUTO: 7.8 10E3/UL (ref 4–11)

## 2024-01-04 PROCEDURE — 250N000013 HC RX MED GY IP 250 OP 250 PS 637: Performed by: STUDENT IN AN ORGANIZED HEALTH CARE EDUCATION/TRAINING PROGRAM

## 2024-01-04 PROCEDURE — 99239 HOSP IP/OBS DSCHRG MGMT >30: CPT | Performed by: HOSPITALIST

## 2024-01-04 PROCEDURE — 85027 COMPLETE CBC AUTOMATED: CPT | Performed by: HOSPITALIST

## 2024-01-04 PROCEDURE — 36415 COLL VENOUS BLD VENIPUNCTURE: CPT | Performed by: STUDENT IN AN ORGANIZED HEALTH CARE EDUCATION/TRAINING PROGRAM

## 2024-01-04 PROCEDURE — 82565 ASSAY OF CREATININE: CPT | Performed by: STUDENT IN AN ORGANIZED HEALTH CARE EDUCATION/TRAINING PROGRAM

## 2024-01-04 PROCEDURE — 84100 ASSAY OF PHOSPHORUS: CPT | Performed by: HOSPITALIST

## 2024-01-04 PROCEDURE — 250N000011 HC RX IP 250 OP 636: Performed by: STUDENT IN AN ORGANIZED HEALTH CARE EDUCATION/TRAINING PROGRAM

## 2024-01-04 PROCEDURE — 250N000013 HC RX MED GY IP 250 OP 250 PS 637: Performed by: HOSPITALIST

## 2024-01-04 PROCEDURE — 250N000013 HC RX MED GY IP 250 OP 250 PS 637: Performed by: INTERNAL MEDICINE

## 2024-01-04 RX ORDER — LEVOFLOXACIN 25 MG/ML
750 SOLUTION ORAL DAILY
Status: DISCONTINUED | OUTPATIENT
Start: 2024-01-04 | End: 2024-01-04 | Stop reason: HOSPADM

## 2024-01-04 RX ADMIN — OXYCODONE HYDROCHLORIDE AND ACETAMINOPHEN 1000 MG: 500 TABLET ORAL at 07:47

## 2024-01-04 RX ADMIN — FLUDROCORTISONE ACETATE 0.1 MG: 0.1 TABLET ORAL at 08:56

## 2024-01-04 RX ADMIN — HEPARIN SODIUM 5000 UNITS: 5000 INJECTION, SOLUTION INTRAVENOUS; SUBCUTANEOUS at 06:51

## 2024-01-04 RX ADMIN — CARBAMAZEPINE 150 MG: 100 SUSPENSION ORAL at 08:56

## 2024-01-04 RX ADMIN — Medication 40 MG: at 07:48

## 2024-01-04 RX ADMIN — CARBAMAZEPINE 150 MG: 100 SUSPENSION ORAL at 00:40

## 2024-01-04 RX ADMIN — HYDROCORTISONE 15 MG: 10 TABLET ORAL at 08:57

## 2024-01-04 RX ADMIN — BRIVARACETAM 100 MG: 10 SOLUTION ORAL at 07:49

## 2024-01-04 RX ADMIN — LEVOFLOXACIN 750 MG: 25 SOLUTION ORAL at 10:04

## 2024-01-04 RX ADMIN — LEVOTHYROXINE SODIUM 125 MCG: 100 TABLET ORAL at 07:48

## 2024-01-04 RX ADMIN — Medication 50 MCG: at 07:48

## 2024-01-04 RX ADMIN — CYANOCOBALAMIN TAB 1000 MCG 2500 MCG: 1000 TAB at 08:56

## 2024-01-04 ASSESSMENT — ACTIVITIES OF DAILY LIVING (ADL)
ADLS_ACUITY_SCORE: 63
ADLS_ACUITY_SCORE: 67
ADLS_ACUITY_SCORE: 63
ADLS_ACUITY_SCORE: 69
ADLS_ACUITY_SCORE: 67
ADLS_ACUITY_SCORE: 67

## 2024-01-04 NOTE — TELEPHONE ENCOUNTER
Home Care is calling regarding an established patient with  [facility].        9/21/2023     8:14 AM   Home Care Information   Date of Home Care episode start 9/15/2023   Current following provider Elsa Queen   Date provider agreed to follow 9/15/2023    Name/Phone Number NANCI Anderson #[phone]   Home Care agency [facility]     Requesting orders from: Elsa Queen  Provider is following patient: Yes  Is this a 60-day recertification request?  No    Orders Requested    Skilled Nursing  Request for resumption in care.     one time a week for 9 weeks.      Information was gathered and will be sent to provider for review.  RN will contact Home Care with information after provider review.  Confirmed ok to leave a detailed message with call back.  Contact information confirmed and updated as needed.    Kamryn Murcia RN

## 2024-01-04 NOTE — TELEPHONE ENCOUNTER
Please call patient writer could not find any open recommended spots for return sooner appointment.

## 2024-01-04 NOTE — PLAN OF CARE
Goal Outcome Evaluation:                      Summary: Aspiration  PNA/COVID. Hx TBI with gaurdian  DATE & TIME: 1/3/2023, pm shift  Cognitive Concerns/ Orientation: HECTOR, speech incoherent/almost nonverbal. Follows some commands at times.  BEHAVIOR & AGGRESSION TOOL COLOR: Green, calm/cooperative   ABNL VS/O2: VSS on RA  MOBILITY:  Up with lift (baseline). Right sided hemiparesis. Turned and repositioned   PAIN MANAGMENT: Denies/no nonverbal indicators of pain  DIET: Continuous tube feeding at goal rate of 55 ml/hr, with water flushes of 160 mL Q4hr,  tolerating.   BOWEL/BLADDER: Incontinent of B&B, external catheter in place/ good UOP. Had bm.  ABNL LAB/BG:  and 108. K 3.4 replaced, rechecked 3.9 . Phos 2.2, packet replacement in progress, to be recheck tomorrow.  DRAIN/DEVICES: G/J tube with continuous feeds via J port and gravity drain attached to gastric port to help alleviate nausea (per mother) with brown/bile output. Peripheral IV right arm SL.  TELEMETRY RHYTHM: NA  SKIN: Sacrum and perineal redness/abrasions/escoriation, wound care to buttocks done per WOC orders.  Scattered bruises. Right heel scabbed old blister, mepilex in place CDI. Mepilex on rt knee abrasion CDI. Prevalon boots applied. +2 BLE edema.   TESTS/PROCEDURES: None this shift.   D/C DAY/GOALS/PLACE: Discharge home tomorrow with mom with stretcher transport between 11:40am -12:25pm. On po Levaquin. Oral care done.

## 2024-01-04 NOTE — PLAN OF CARE
Goal Outcome Evaluation:  DATE & TIME: 1/4/2023, 0700-12;20  Cognitive Concerns/ Orientation: HECTOR, speech incoherent/almost nonverbal. Follows some commands at times.  BEHAVIOR & AGGRESSION TOOL COLOR: Green, calm/cooperative   ABNL VS/O2: VSS on RA  MOBILITY:  Up with lift (baseline). Right sided hemiparesis. Turned and repositioned   PAIN MANAGMENT: Denied. No nonverbal indicators of pain  DIET: Continuous tube feeding at goal rate of 55 ml/hr, with water flushes of 160 mL Q4hr,  tolerating.   BOWEL/BLADDER: Incontinent of B&B, . No BM this shift.  ABNL LAB/BG: BG 84  DRAIN/DEVICES: G/J tube with continuous feeds via J port and gravity drain attached to gastric port to help alleviate nausea (per mother) with brown/bile output. .  TELEMETRY RHYTHM: NA  SKIN: Sacrum and perineal redness/abrasions/escoriation, wound care to buttocks done per WOC orders. Scattered bruises. Right heel scabbed old blister, mepilex changed, right knee abrasion meplix changed.  Prevalon boots in place. +2 BLE edema.   TESTS/PROCEDURES: None this shift.   D/C DAY/GOALS/PLACE: Discharge home today with mom with stretcher transport between 11:40am -12:25pm.          Discharge    Patient discharged to  home via stretcher  Care plan note;done  Listed belongings gathered and given to patient (including from security/pharmacy). ;yes  Care Plan and Patient education resolved: ;yes  Prescriptions if needed, hard copies sent with patient  ; NA  Medication Bin checked and emptied on discharge ; yes  SW/care coordinator/charge RN aware of discharge: ;yes     Given discharge instructions to patients mother Savannah over the phone  and discharge to home via stretcher.

## 2024-01-04 NOTE — PLAN OF CARE
Summary: Aspiration  PNA/COVID. Hx TBI with gaurdian  DATE & TIME: 1/3/2023, 9348 - 6748  Cognitive Concerns/ Orientation: HECTOR, speech incoherent/almost nonverbal. Follows some commands at times.  BEHAVIOR & AGGRESSION TOOL COLOR: Green, calm/cooperative   ABNL VS/O2: VSS on RA  MOBILITY:  Up with lift (baseline). Right sided hemiparesis. Turned and repositioned   PAIN MANAGMENT: Denied. No nonverbal indicators of pain  DIET: Continuous tube feeding at goal rate of 55 ml/hr, with water flushes of 160 mL Q4hr,  tolerating.   BOWEL/BLADDER: Incontinent of B&B, external catheter in place with good UOP. No BM this shift.  ABNL LAB/BG: /119  DRAIN/DEVICES: G/J tube with continuous feeds via J port and gravity drain attached to gastric port to help alleviate nausea (per mother) with brown/bile output. Peripheral IV right arm SL.  TELEMETRY RHYTHM: NA  SKIN: Sacrum and perineal redness/abrasions/escoriation, wound care to buttocks done per WOC orders. Scattered bruises. Right heel scabbed old blister, mepilex in place CDI. Mepilex on right knee abrasion CDI. Prevalon boots in place. +2 BLE edema.   TESTS/PROCEDURES: None this shift.   D/C DAY/GOALS/PLACE: Discharge home today with mom with stretcher transport between 11:40am -12:25pm.

## 2024-01-04 NOTE — PROGRESS NOTES
Care Management Discharge Note    Discharge Date: 01/04/2024       Discharge Disposition: Home    Discharge Services: Transportation Services    Discharge DME: None    Discharge Transportation: health plan transportation    Private pay costs discussed: Not applicable    Does the patient's insurance plan have a 3 day qualifying hospital stay waiver?  No    PAS Confirmation Code:  NA  Patient/family educated on Medicare website which has current facility and service quality ratings:  NA    Education Provided on the Discharge Plan:  yes  Persons Notified of Discharge Plans: patient's mother, Nsg  Patient/Family in Agreement with the Plan: yes    Handoff Referral Completed: Yes    Additional Information:  Patient was discharged home via [facility] stretcher transport.  Patient's mother had reported patient had home care nursing thru [facility].  In looking into this this AM, found patient's home care had ended in November and [facility] had sent a letter to his mother.  Reportedly, this agency had done all they could possibly due to prevent readmissions and this has been unsuccessful.  In discussion with patient's mother this AM, she reports she is her son's home care.  Patient has had six admissions the past five months.    Will send hand off to clinic Care Coordinator.  PCP follow-up has been scheduled on 1/11/24 with his PCP, Dr Queen at [facility].    Karen Collins, RN  Inpatient Care Management  [phone]
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
## 2024-01-05 ENCOUNTER — PATIENT OUTREACH (OUTPATIENT)
Dept: CARE COORDINATION | Facility: CLINIC | Age: 64
End: 2024-01-05
Payer: MEDICARE

## 2024-01-05 NOTE — PROGRESS NOTES
"Clinic Care Coordination Contact  [facility]/Voicemail    Clinical Data: Care Coordinator Outreach    Outreach Documentation Number of Outreach Attempt   12/11/2023   9:44 AM 1   12/12/2023  10:13 AM 2   1/5/2024  11:09 AM 1     Unable to leave a  message on patient's, mothers, Savannah,  voicemail with call back information and requested return call due to the voice mail box is \"full\".    Plan: Care Coordinator will try to reach patient again in 1-2 business days.     Heather Hong RN, BSN, PHN  Primary Care / Care Coordinator   [facility]  E-mail [email or web address]   [phone]      "

## 2024-01-08 ENCOUNTER — MEDICAL CORRESPONDENCE (OUTPATIENT)
Dept: HEALTH INFORMATION MANAGEMENT | Facility: CLINIC | Age: 64
End: 2024-01-08

## 2024-01-08 ENCOUNTER — PATIENT OUTREACH (OUTPATIENT)
Dept: CARE COORDINATION | Facility: CLINIC | Age: 64
End: 2024-01-08
Payer: MEDICARE

## 2024-01-08 DIAGNOSIS — R68.89 EXCESSIVE ORAL SECRETIONS: ICD-10-CM

## 2024-01-08 RX ORDER — GLYCOPYRROLATE 1 MG/1
TABLET ORAL
Qty: 180 TABLET | Refills: 3 | Status: SHIPPED | OUTPATIENT
Start: 2024-01-08 | End: 2024-01-11

## 2024-01-08 NOTE — PROGRESS NOTES
"Clinic Care Coordination Contact  [facility]    Clinical Data: Care Coordinator Outreach    Outreach Documentation Number of Outreach Attempt   12/11/2023   9:44 AM 1   12/12/2023  10:13 AM 2   1/5/2024  11:09 AM 1   1/8/2024   8:39 AM 2       Unable to leave a voice mail box on patients' moms phone with call back information and requested return call as voice mail box is \"full\".    Plan: Care Coordinator will send care coordination introduction letter with care coordinator contact information and explanation of care coordination services via mail. Care Coordinator will do no further outreaches at this time.     Heather Hong RN, BSN, PHN  Primary Care / Care Coordinator   [facility]  E-mail [email or web address]   [phone]      "

## 2024-01-08 NOTE — LETTER
[facility] CARE COORDINATION  [address] [place] [address]    January 8, 2024    Keyon GLADYS Farias  [address] [place] [address]      Dear Keyon,    I am a clinic care coordinator who works with Elsa Queen MD with the [facility]. I recently tried to call and was unable to reach you. Below is a description of clinic care coordination and how I can further assist you.       The clinic care coordination team is made up of a registered nurse, , financial resource worker and community health worker who understand the health care system. The goal of clinic care coordination is to help you manage your health and improve access to the health care system. Our team works alongside your provider to assist you in determining your health and social needs. We can help you obtain health care and community resources, providing you with necessary information and education. We can work with you through any barriers and develop a care plan that helps coordinate and strengthen the communication between you and your care team.  Our services are voluntary and are offered without charge to you personally.    Please feel free to contact me with any questions or concerns regarding care coordination and what we can offer.      We are focused on providing you with the highest-quality healthcare experience possible.    Sincerely,      Heather Hong RN, BSN, PHN  Primary Care / Care Coordinator   [facility]  E-mail [email or web address]   [phone]

## 2024-01-11 ENCOUNTER — OFFICE VISIT (OUTPATIENT)
Dept: FAMILY MEDICINE | Facility: CLINIC | Age: 64
End: 2024-01-11
Payer: MEDICARE

## 2024-01-11 VITALS
BODY MASS INDEX: 21.62 KG/M2 | WEIGHT: 151 LBS | OXYGEN SATURATION: 100 % | TEMPERATURE: 96.8 F | RESPIRATION RATE: 16 BRPM | HEART RATE: 74 BPM | HEIGHT: 70 IN | DIASTOLIC BLOOD PRESSURE: 57 MMHG | SYSTOLIC BLOOD PRESSURE: 96 MMHG

## 2024-01-11 DIAGNOSIS — Z12.11 SCREEN FOR COLON CANCER: Primary | ICD-10-CM

## 2024-01-11 DIAGNOSIS — E03.9 HYPOTHYROIDISM, UNSPECIFIED TYPE: ICD-10-CM

## 2024-01-11 DIAGNOSIS — Z12.5 SCREENING FOR PROSTATE CANCER: ICD-10-CM

## 2024-01-11 DIAGNOSIS — Z23 NEED FOR PROPHYLACTIC VACCINATION AND INOCULATION AGAINST INFLUENZA: ICD-10-CM

## 2024-01-11 DIAGNOSIS — R68.89 EXCESSIVE ORAL SECRETIONS: ICD-10-CM

## 2024-01-11 DIAGNOSIS — D64.9 ANEMIA, UNSPECIFIED TYPE: ICD-10-CM

## 2024-01-11 DIAGNOSIS — J69.0 ASPIRATION PNEUMONITIS (H): ICD-10-CM

## 2024-01-11 LAB
ERYTHROCYTE [DISTWIDTH] IN BLOOD BY AUTOMATED COUNT: 13.6 % (ref 10–15)
HCT VFR BLD AUTO: 43.9 % (ref 40–53)
HGB BLD-MCNC: 13.9 G/DL (ref 13.3–17.7)
MCH RBC QN AUTO: 29 PG (ref 26.5–33)
MCHC RBC AUTO-ENTMCNC: 31.7 G/DL (ref 31.5–36.5)
MCV RBC AUTO: 92 FL (ref 78–100)
PLATELET # BLD AUTO: 249 10E3/UL (ref 150–450)
RBC # BLD AUTO: 4.8 10E6/UL (ref 4.4–5.9)
WBC # BLD AUTO: 8.5 10E3/UL (ref 4–11)

## 2024-01-11 PROCEDURE — G0103 PSA SCREENING: HCPCS | Performed by: INTERNAL MEDICINE

## 2024-01-11 PROCEDURE — 84443 ASSAY THYROID STIM HORMONE: CPT | Performed by: INTERNAL MEDICINE

## 2024-01-11 PROCEDURE — 90686 IIV4 VACC NO PRSV 0.5 ML IM: CPT | Performed by: INTERNAL MEDICINE

## 2024-01-11 PROCEDURE — G0008 ADMIN INFLUENZA VIRUS VAC: HCPCS | Performed by: INTERNAL MEDICINE

## 2024-01-11 PROCEDURE — 36415 COLL VENOUS BLD VENIPUNCTURE: CPT | Performed by: INTERNAL MEDICINE

## 2024-01-11 PROCEDURE — 85027 COMPLETE CBC AUTOMATED: CPT | Performed by: INTERNAL MEDICINE

## 2024-01-11 PROCEDURE — 99495 TRANSJ CARE MGMT MOD F2F 14D: CPT | Mod: 25 | Performed by: INTERNAL MEDICINE

## 2024-01-11 PROCEDURE — 84439 ASSAY OF FREE THYROXINE: CPT | Performed by: INTERNAL MEDICINE

## 2024-01-11 RX ORDER — GLYCOPYRROLATE 1 MG/1
TABLET ORAL
Qty: 180 TABLET | Refills: 3 | Status: SHIPPED | OUTPATIENT
Start: 2024-01-11 | End: 2024-09-05

## 2024-01-11 RX ORDER — ALBUTEROL SULFATE 5 MG/ML
2.5 SOLUTION RESPIRATORY (INHALATION) EVERY 6 HOURS PRN
Qty: 240 ML | Refills: 0 | Status: SHIPPED | OUTPATIENT
Start: 2024-01-11 | End: 2024-04-01

## 2024-01-11 RX ORDER — RESPIRATORY SYNCYTIAL VIRUS VACCINE 120MCG/0.5
0.5 KIT INTRAMUSCULAR ONCE
Qty: 1 EACH | Refills: 0 | Status: CANCELLED | OUTPATIENT
Start: 2024-01-11 | End: 2024-01-11

## 2024-01-11 ASSESSMENT — PAIN SCALES - GENERAL: PAINLEVEL: NO PAIN (0)

## 2024-01-11 NOTE — PROGRESS NOTES
Assessment & Plan     Screen for colon cancer  - Fecal colorectal cancer screen FIT - Future (S+30)    Hypothyroidism, unspecified type  Currently on 125 mcg. TSH has been low in the past.   Will check again.   - TSH with free T4 reflex  - T4 free    Anemia, unspecified type  - CBC with Platelets (Today)    Screening for prostate cancer  - PSA, screen    Aspiration pneumonitis (H)  Stable. Continue medication.  - albuterol (PROVENTIL) (5 MG/ML) 0.5% neb solution; Take 0.5 mLs (2.5 mg) by nebulization every 6 hours as needed for shortness of breath, wheezing or cough 0700 1100 1500 1900 with mucomyst    Excessive oral secretions  Stable. Continue medication.  - glycopyrrolate (ROBINUL) 1 MG tablet; TAKE 1 TABLET(1 MG) BY MOUTH TWICE DAILY AS NEEDED FOR SECRETIONS  - COMPOUNDED NON-CONTROLLED SUBSTANCE (CMPD RX) - PHARMACY TO MIX COMPOUNDED MEDICATION; Scopolamine 0.4mg capsules - take  2 capsule by feeding tube three times daily as needed    Need for prophylactic vaccination and inoculation against influenza  Flu shot      MED REC REQUIRED  Post Medication Reconciliation Status:  Discharge medications reconciled, continue medications without change  Discharge medications reconciled and changed, see notes/orders  See Patient Instructions    Elsa Queen MD  [facility] PERNELL Ta is a 63 year old, presenting for the following health issues:  Hospital F/U and Imm/Inj (Flu Shot)    HPI     Keyon is here with his mother for hospital follow up.     He was brought to the ED 12/29/23 by family secondary to worsening dyspnea and elevated resp rate in setting of emesis episode 2 days prior.   CT Chest showed multifocal airspace disease with RLL predominance compatible with aspiration.     He was started on stress dose steroids and antibiotics.   He continues to test positive for COVID PCR since October 2023.   He was not given covid treatment during this visit. (Remdesivir).    He is doing well  "today.     Hospital Follow-up Visit:    Hospital/Nursing Home/IP Rehab Facility: [facility]  Date of Admission: 12-  Date of Discharge: 1-4-2024  Reason(s) for Admission: Sepsis, due to unspecified organism, unspecified whether acute organ dysfunction present (H)     Was your hospitalization related to COVID-19? No   Problems taking medications regularly:  None  Medication changes since discharge: None  Problems adhering to non-medication therapy:  None    Summary of hospitalization:  [facility] discharge summary reviewed  Diagnostic Tests/Treatments reviewed.  Follow up needed: none  Other Healthcare Providers Involved in Patient s Care:         None  Update since discharge: improved.  Plan of care communicated with patient       Review of Systems       Objective    BP 96/57 (BP Location: Left arm, Patient Position: Sitting, Cuff Size: Adult Large)   Pulse 74   Temp 96.8  F (36  C) (Tympanic)   Resp 16   Ht 1.778 m (5' 10\")   Wt 68.5 kg (151 lb)   SpO2 100%   BMI 21.67 kg/m    Body mass index is 21.67 kg/m .  Physical Exam                 "

## 2024-01-12 LAB
PSA SERPL DL<=0.01 NG/ML-MCNC: 0.58 NG/ML (ref 0–4.5)
T4 FREE SERPL-MCNC: 1.25 NG/DL (ref 0.9–1.7)
TSH SERPL DL<=0.005 MIU/L-ACNC: <0.01 UIU/ML (ref 0.3–4.2)

## 2024-01-14 ENCOUNTER — TELEPHONE (OUTPATIENT)
Dept: FAMILY MEDICINE | Facility: CLINIC | Age: 64
End: 2024-01-14
Payer: MEDICARE

## 2024-01-14 DIAGNOSIS — E03.9 HYPOTHYROIDISM, UNSPECIFIED TYPE: Primary | ICD-10-CM

## 2024-01-14 PROBLEM — Z23 NEED FOR PROPHYLACTIC VACCINATION AND INOCULATION AGAINST INFLUENZA: Status: ACTIVE | Noted: 2024-01-14

## 2024-01-14 RX ORDER — LEVOTHYROXINE SODIUM 112 UG/1
112 TABLET ORAL DAILY
Qty: 90 TABLET | Refills: 0 | Status: ON HOLD | OUTPATIENT
Start: 2024-01-14 | End: 2024-04-19

## 2024-01-14 NOTE — TELEPHONE ENCOUNTER
Please call patient for test results:  TSH is low, which means thyroid supplement should be decreased to 112 mcg daily. I have sent a 90 day supply.   Plan to recheck TSH in 2 months.

## 2024-01-15 NOTE — TELEPHONE ENCOUNTER
Called mother who is guardian. She verbalized understanding and wrote it down.     Appointments in Next Year      Mar 13, 2024  2:00 PM  LAB with CS LAB  [facility] ([facility] ) [phone]     Jul 24, 2024  1:30 PM  (Arrive by 1:15 PM)  RETURN ENDOCRINE with Faizan Mclean MD  [facility] [place] ([facility] ) [phone]

## 2024-01-16 NOTE — PROGRESS NOTES
Not on CPAP     Patient seen and examined    - has been afebrile since 8/10; BP improved and stable; respiratory status stable on room air  - phosphorus 1.0---> 2.5  -will switch antibiotics to liquid Augmentin by J-tube X 3 more days to complete a 7 day course  -her mother recently switched the home care agency and reports having some difficulty getting the tube feeding supplies;  consulted for disposition    Discharge home today    Please see discharge summary for details

## 2024-01-17 ENCOUNTER — TELEPHONE (OUTPATIENT)
Dept: FAMILY MEDICINE | Facility: CLINIC | Age: 64
End: 2024-01-17
Payer: MEDICARE

## 2024-01-17 DIAGNOSIS — S06.9X9S TRAUMATIC BRAIN INJURY WITH LOSS OF CONSCIOUSNESS, SEQUELA (H): ICD-10-CM

## 2024-01-17 DIAGNOSIS — G81.01 FLACCID HEMIPLEGIA AFFECTING RIGHT DOMINANT SIDE, UNSPECIFIED ETIOLOGY (H): Primary | ICD-10-CM

## 2024-01-17 NOTE — TELEPHONE ENCOUNTER
[facility] (Rae )     Phone: [phone]    Assistive device for talking/communicating - pt's mom would be open to that     Wants to have a speech therapy evaluation done    Per chart review looks like patient is enrolled with [facility], so may be able to add speech therapy evaluation?     Called pt's  listed in prior encounter (Monica RN #[phone]).     Left a detailed message for Monica seeing if adding a speech therapy evaluation would be an option for pt, asking for call back from home care     Brooke Rock RN
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
## 2024-01-17 NOTE — TELEPHONE ENCOUNTER
[facility] home health returning call to clinic.     Pt is no longer being followed by [facility] x 1  month. Pt was discharged, not progressing towards goals    Routing to provider;     LOV 1/11/24.    Please see original note: request from [facility]  for speech therapy.      Referral pending; please review/modify/sign if approved.

## 2024-01-18 ENCOUNTER — TRANSFERRED RECORDS (OUTPATIENT)
Dept: HEALTH INFORMATION MANAGEMENT | Facility: CLINIC | Age: 64
End: 2024-01-18
Payer: MEDICARE

## 2024-01-21 ENCOUNTER — APPOINTMENT (OUTPATIENT)
Dept: GENERAL RADIOLOGY | Facility: CLINIC | Age: 64
DRG: 871 | End: 2024-01-21
Attending: EMERGENCY MEDICINE
Payer: MEDICARE

## 2024-01-21 ENCOUNTER — HOSPITAL ENCOUNTER (INPATIENT)
Facility: CLINIC | Age: 64
LOS: 5 days | Discharge: HOME HEALTH | DRG: 871 | End: 2024-01-26
Attending: EMERGENCY MEDICINE | Admitting: INTERNAL MEDICINE
Payer: MEDICARE

## 2024-01-21 DIAGNOSIS — R50.9 FEVER, UNSPECIFIED FEVER CAUSE: ICD-10-CM

## 2024-01-21 DIAGNOSIS — R09.02 HYPOXIA: ICD-10-CM

## 2024-01-21 DIAGNOSIS — E83.39 HYPOPHOSPHATEMIA: ICD-10-CM

## 2024-01-21 DIAGNOSIS — G82.20 PARAPLEGIA (H): ICD-10-CM

## 2024-01-21 DIAGNOSIS — E86.0 DEHYDRATION: ICD-10-CM

## 2024-01-21 DIAGNOSIS — A41.9 SEPSIS WITHOUT ACUTE ORGAN DYSFUNCTION, DUE TO UNSPECIFIED ORGANISM (H): Primary | ICD-10-CM

## 2024-01-21 DIAGNOSIS — E87.0 HYPERNATREMIA: ICD-10-CM

## 2024-01-21 LAB
ALBUMIN SERPL BCG-MCNC: 4 G/DL (ref 3.5–5.2)
ALBUMIN UR-MCNC: 100 MG/DL
ALP SERPL-CCNC: 110 U/L (ref 40–150)
ALT SERPL W P-5'-P-CCNC: 33 U/L (ref 0–70)
AMORPH CRY #/AREA URNS HPF: ABNORMAL /HPF
ANION GAP SERPL CALCULATED.3IONS-SCNC: 12 MMOL/L (ref 7–15)
APPEARANCE UR: CLEAR
AST SERPL W P-5'-P-CCNC: 49 U/L (ref 0–45)
ATRIAL RATE - MUSE: 116 BPM
BASOPHILS # BLD AUTO: 0.1 10E3/UL (ref 0–0.2)
BASOPHILS NFR BLD AUTO: 1 %
BILIRUB SERPL-MCNC: 0.3 MG/DL
BILIRUB UR QL STRIP: NEGATIVE
BUN SERPL-MCNC: 29.7 MG/DL (ref 8–23)
CALCIUM SERPL-MCNC: 9.5 MG/DL (ref 8.8–10.2)
CHLORIDE SERPL-SCNC: 94 MMOL/L (ref 98–107)
COLOR UR AUTO: YELLOW
CREAT SERPL-MCNC: 1.03 MG/DL (ref 0.67–1.17)
DEPRECATED HCO3 PLAS-SCNC: 35 MMOL/L (ref 22–29)
DIASTOLIC BLOOD PRESSURE - MUSE: NORMAL MMHG
EGFRCR SERPLBLD CKD-EPI 2021: 83 ML/MIN/1.73M2
EOSINOPHIL # BLD AUTO: 0.6 10E3/UL (ref 0–0.7)
EOSINOPHIL NFR BLD AUTO: 5 %
ERYTHROCYTE [DISTWIDTH] IN BLOOD BY AUTOMATED COUNT: 13.5 % (ref 10–15)
FLUAV RNA SPEC QL NAA+PROBE: NEGATIVE
FLUBV RNA RESP QL NAA+PROBE: NEGATIVE
GLUCOSE SERPL-MCNC: 178 MG/DL (ref 70–99)
GLUCOSE UR STRIP-MCNC: NEGATIVE MG/DL
HCO3 BLDV-SCNC: 44 MMOL/L (ref 21–28)
HCT VFR BLD AUTO: 46.3 % (ref 40–53)
HGB BLD-MCNC: 14.6 G/DL (ref 13.3–17.7)
HGB UR QL STRIP: NEGATIVE
HOLD SPECIMEN: NORMAL
HYALINE CASTS: 3 /LPF
IMM GRANULOCYTES # BLD: 0 10E3/UL
IMM GRANULOCYTES NFR BLD: 0 %
INTERPRETATION ECG - MUSE: NORMAL
KETONES UR STRIP-MCNC: NEGATIVE MG/DL
LACTATE BLD-SCNC: 2.4 MMOL/L
LACTATE SERPL-SCNC: 2.6 MMOL/L (ref 0.7–2)
LEUKOCYTE ESTERASE UR QL STRIP: ABNORMAL
LYMPHOCYTES # BLD AUTO: 2.2 10E3/UL (ref 0.8–5.3)
LYMPHOCYTES NFR BLD AUTO: 20 %
MAGNESIUM SERPL-MCNC: 2.8 MG/DL (ref 1.7–2.3)
MCH RBC QN AUTO: 28.3 PG (ref 26.5–33)
MCHC RBC AUTO-ENTMCNC: 31.5 G/DL (ref 31.5–36.5)
MCV RBC AUTO: 90 FL (ref 78–100)
MONOCYTES # BLD AUTO: 1 10E3/UL (ref 0–1.3)
MONOCYTES NFR BLD AUTO: 9 %
MUCOUS THREADS #/AREA URNS LPF: PRESENT /LPF
NEUTROPHILS # BLD AUTO: 6.9 10E3/UL (ref 1.6–8.3)
NEUTROPHILS NFR BLD AUTO: 65 %
NITRATE UR QL: NEGATIVE
NRBC # BLD AUTO: 0 10E3/UL
NRBC BLD AUTO-RTO: 0 /100
P AXIS - MUSE: 62 DEGREES
PCO2 BLDV: 55 MM HG (ref 40–50)
PH BLDV: 7.51 [PH] (ref 7.32–7.43)
PH UR STRIP: 8.5 [PH] (ref 5–7)
PLATELET # BLD AUTO: 201 10E3/UL (ref 150–450)
PO2 BLDV: 47 MM HG (ref 25–47)
POTASSIUM SERPL-SCNC: 3.8 MMOL/L (ref 3.4–5.3)
PR INTERVAL - MUSE: 152 MS
PROT SERPL-MCNC: 8.5 G/DL (ref 6.4–8.3)
QRS DURATION - MUSE: 82 MS
QT - MUSE: 330 MS
QTC - MUSE: 458 MS
R AXIS - MUSE: -58 DEGREES
RBC # BLD AUTO: 5.16 10E6/UL (ref 4.4–5.9)
RBC URINE: 1 /HPF
RSV RNA SPEC NAA+PROBE: NEGATIVE
SAO2 % BLDV: 85 % (ref 70–75)
SARS-COV-2 RNA RESP QL NAA+PROBE: NEGATIVE
SODIUM SERPL-SCNC: 141 MMOL/L (ref 135–145)
SP GR UR STRIP: 1.03 (ref 1–1.03)
SYSTOLIC BLOOD PRESSURE - MUSE: NORMAL MMHG
T AXIS - MUSE: 70 DEGREES
TROPONIN T SERPL HS-MCNC: 54 NG/L
UROBILINOGEN UR STRIP-MCNC: NORMAL MG/DL
VENTRICULAR RATE- MUSE: 116 BPM
WBC # BLD AUTO: 10.7 10E3/UL (ref 4–11)
WBC URINE: 1 /HPF

## 2024-01-21 PROCEDURE — 93005 ELECTROCARDIOGRAM TRACING: CPT

## 2024-01-21 PROCEDURE — 82803 BLOOD GASES ANY COMBINATION: CPT

## 2024-01-21 PROCEDURE — 99285 EMERGENCY DEPT VISIT HI MDM: CPT | Mod: 25

## 2024-01-21 PROCEDURE — 83735 ASSAY OF MAGNESIUM: CPT | Performed by: EMERGENCY MEDICINE

## 2024-01-21 PROCEDURE — 84484 ASSAY OF TROPONIN QUANT: CPT | Performed by: EMERGENCY MEDICINE

## 2024-01-21 PROCEDURE — 210N000002 HC R&B HEART CARE

## 2024-01-21 PROCEDURE — 87637 SARSCOV2&INF A&B&RSV AMP PRB: CPT | Performed by: INTERNAL MEDICINE

## 2024-01-21 PROCEDURE — 36415 COLL VENOUS BLD VENIPUNCTURE: CPT | Performed by: EMERGENCY MEDICINE

## 2024-01-21 PROCEDURE — 258N000003 HC RX IP 258 OP 636: Performed by: EMERGENCY MEDICINE

## 2024-01-21 PROCEDURE — 87149 DNA/RNA DIRECT PROBE: CPT | Performed by: EMERGENCY MEDICINE

## 2024-01-21 PROCEDURE — 85025 COMPLETE CBC W/AUTO DIFF WBC: CPT | Performed by: EMERGENCY MEDICINE

## 2024-01-21 PROCEDURE — 71045 X-RAY EXAM CHEST 1 VIEW: CPT

## 2024-01-21 PROCEDURE — 250N000013 HC RX MED GY IP 250 OP 250 PS 637: Performed by: INTERNAL MEDICINE

## 2024-01-21 PROCEDURE — 87181 SC STD AGAR DILUTION PER AGT: CPT | Performed by: EMERGENCY MEDICINE

## 2024-01-21 PROCEDURE — 36415 COLL VENOUS BLD VENIPUNCTURE: CPT

## 2024-01-21 PROCEDURE — 83605 ASSAY OF LACTIC ACID: CPT

## 2024-01-21 PROCEDURE — 99223 1ST HOSP IP/OBS HIGH 75: CPT | Mod: AI | Performed by: INTERNAL MEDICINE

## 2024-01-21 PROCEDURE — 81001 URINALYSIS AUTO W/SCOPE: CPT | Performed by: EMERGENCY MEDICINE

## 2024-01-21 PROCEDURE — 250N000011 HC RX IP 250 OP 636: Performed by: EMERGENCY MEDICINE

## 2024-01-21 PROCEDURE — 80053 COMPREHEN METABOLIC PANEL: CPT | Performed by: EMERGENCY MEDICINE

## 2024-01-21 RX ORDER — PIPERACILLIN SODIUM, TAZOBACTAM SODIUM 4; .5 G/20ML; G/20ML
4.5 INJECTION, POWDER, LYOPHILIZED, FOR SOLUTION INTRAVENOUS ONCE
Status: COMPLETED | OUTPATIENT
Start: 2024-01-21 | End: 2024-01-21

## 2024-01-21 RX ORDER — SODIUM CHLORIDE 9 MG/ML
INJECTION, SOLUTION INTRAVENOUS CONTINUOUS
Status: DISCONTINUED | OUTPATIENT
Start: 2024-01-22 | End: 2024-01-23

## 2024-01-21 RX ORDER — CARBAMAZEPINE 100 MG/5ML
150 SUSPENSION ORAL
Status: DISCONTINUED | OUTPATIENT
Start: 2024-01-22 | End: 2024-01-26 | Stop reason: HOSPADM

## 2024-01-21 RX ORDER — CARBAMAZEPINE 100 MG/5ML
100 SUSPENSION ORAL DAILY
Status: DISCONTINUED | OUTPATIENT
Start: 2024-01-21 | End: 2024-01-26 | Stop reason: HOSPADM

## 2024-01-21 RX ORDER — CARBAMAZEPINE 100 MG/5ML
100 SUSPENSION ORAL EVERY 24 HOURS
Status: DISCONTINUED | OUTPATIENT
Start: 2024-01-21 | End: 2024-01-21

## 2024-01-21 RX ORDER — PIPERACILLIN SODIUM, TAZOBACTAM SODIUM 3; .375 G/15ML; G/15ML
3.38 INJECTION, POWDER, LYOPHILIZED, FOR SOLUTION INTRAVENOUS EVERY 6 HOURS
Status: CANCELLED | OUTPATIENT
Start: 2024-01-21

## 2024-01-21 RX ADMIN — PIPERACILLIN AND TAZOBACTAM 4.5 G: 4; .5 INJECTION, POWDER, FOR SOLUTION INTRAVENOUS at 18:34

## 2024-01-21 RX ADMIN — SODIUM CHLORIDE 1000 ML: 9 INJECTION, SOLUTION INTRAVENOUS at 17:39

## 2024-01-21 RX ADMIN — BRIVARACETAM 100 MG: 10 SOLUTION ORAL at 23:01

## 2024-01-21 RX ADMIN — CARBAMAZEPINE 150 MG: 100 SUSPENSION ORAL at 23:17

## 2024-01-21 ASSESSMENT — ACTIVITIES OF DAILY LIVING (ADL)
ADLS_ACUITY_SCORE: 37

## 2024-01-21 NOTE — ED NOTES
Bed: ED29  Expected date:   Expected time:   Means of arrival:   Comments:  Kerri Crum fever quad eta 1558
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
## 2024-01-21 NOTE — PHARMACY-ADMISSION MEDICATION HISTORY
Pharmacist Admission Medication History    Admission medication history is complete. The information provided in this note is only as accurate as the sources available at the time of the update.    Information Source(s): Family member and CareEverywhere/[employer] via phone    Pertinent Information: Med hx obtained from patient's mom, Savannah.     Changes made to PTA medication list:  Added: None  Deleted: levothyroxine 125 mcg (dose now 112 mcg)  Changed: None    Allergies reviewed with patient and updates made in EHR: no    Medication History Completed By: Shaneka Chan RP 1/21/2024 4:51 PM    PTA Med List   Medication Sig Note Last Dose    acetaminophen (TYLENOL) 325 MG tablet Take 650 mg by mouth every 6 hours as needed for mild pain      albuterol (PROVENTIL) (5 MG/ML) 0.5% neb solution Take 0.5 mLs (2.5 mg) by nebulization every 6 hours as needed for shortness of breath, wheezing or cough 0700 1100 1500 1900 with mucomyst      bacitracin 500 UNIT/GM external ointment Apply topically daily as needed for wound care To PEG site.      Brivaracetam (BRIVIACT) 10 MG/ML solution 100 mg by Oral or Feeding Tube route 2 times daily 0900, 2100  1/21/2024 at 0900    carBAMazepine (TEGRETOL) 100 MG/5ML suspension 7.5 mLs (150 mg) by Oral or Feeding Tube route 3 times daily At 06:00, 12:00, and 24:00 for seizures  1/21/2024 at 1200    carBAMazepine (TEGRETOL) 100 MG/5ML suspension 100 mg by Per Feeding Tube route daily Take at 1800  1/20/2024 at 1800    COMPOUNDED NON-CONTROLLED SUBSTANCE (CMPD RX) - PHARMACY TO MIX COMPOUNDED MEDICATION Scopolamine 0.4mg capsules - take  2 capsule by feeding tube three times daily as needed      Cyanocobalamin (VITAMIN B-12 PO) 2,500 mcg by Per Feeding Tube route daily  1/21/2024    glycopyrrolate (ROBINUL) 1 MG tablet TAKE 1 TABLET(1 MG) BY MOUTH TWICE DAILY AS NEEDED FOR SECRETIONS      guaiFENesin (MUCINEX) 600 MG 12 hr tablet Take 1 tablet (600 mg) by mouth 2 times daily as needed for  congestion      hydrocortisone (CORTAID) 1 % external cream Apply topically 2 times daily as needed Apply to reddened memo areas as needed      hydrocortisone (CORTEF) 5 MG tablet Take 15 mg (3 tablets) in the morning and 7.5 mg (1.5 tablet)  at 2:00 PM. During illness patient takes more as a stress dose. Please increase the dose as directed. (Patient taking differently: Take 15 mg (3 tablets) in the morning and 7.5 mg (1.5 tablet)  at 6:00 PM. Per feeding tube. During illness patient takes more as a stress dose. Mother reports doubling each dose for fever)  1/21/2024 at AM    levothyroxine (SYNTHROID/LEVOTHROID) 112 MCG tablet Take 1 tablet (112 mcg) by mouth daily  1/21/2024 at 0500    metoclopramide (REGLAN) 10 MG/10ML SOLN solution Take 10 mLs (10 mg) by mouth 4 times daily (before meals and nightly) 0800, 1200, 1600, 2000 Disconnects bag before administration, then waits 45 mins before reconnecting after giving the medication  1/21/2024 at 1200    miconazole (MICATIN) 2 % external powder Apply topically 2 times daily as needed      multivitamin, therapeutic (THERA-VIT) TABS tablet 1 tablet by Per Feeding Tube route daily  1/21/2024    mupirocin (BACTROBAN) 2 % external ointment APPLY TOPICALLY TO THE AFFECTED AREA TWICE DAILY AS NEEDED      pantoprazole (PROTONIX) 2 mg/mL SUSP suspension TAKE 20ML PER FEEDING TUBE DAILY  1/21/2024    testosterone cypionate (DEPOTESTOSTERONE) 200 MG/ML injection Inject 0.25 mLs (50 mg) into the muscle once a week 1/21/2024: Thursdays Past Week    vitamin C (ASCORBIC ACID) 1000 MG TABS 1,000 mg by Oral or Feeding Tube route daily   1/21/2024    vitamin D3 (CHOLECALCIFEROL) 2000 units (50 mcg) tablet 2,000 Units by Per Feeding Tube route daily  1/21/2024

## 2024-01-22 LAB
ACANTHOCYTES BLD QL SMEAR: ABNORMAL
ANION GAP SERPL CALCULATED.3IONS-SCNC: 11 MMOL/L (ref 7–15)
AUER BODIES BLD QL SMEAR: ABNORMAL
BASO STIPL BLD QL SMEAR: ABNORMAL
BITE CELLS BLD QL SMEAR: ABNORMAL
BLISTER CELLS BLD QL SMEAR: ABNORMAL
BUN SERPL-MCNC: 26.4 MG/DL (ref 8–23)
BURR CELLS BLD QL SMEAR: ABNORMAL
CALCIUM SERPL-MCNC: 8.7 MG/DL (ref 8.8–10.2)
CHLORIDE SERPL-SCNC: 99 MMOL/L (ref 98–107)
CREAT SERPL-MCNC: 1.01 MG/DL (ref 0.67–1.17)
DACRYOCYTES BLD QL SMEAR: ABNORMAL
DEPRECATED HCO3 PLAS-SCNC: 34 MMOL/L (ref 22–29)
EGFRCR SERPLBLD CKD-EPI 2021: 85 ML/MIN/1.73M2
ELLIPTOCYTES BLD QL SMEAR: ABNORMAL
ERYTHROCYTE [DISTWIDTH] IN BLOOD BY AUTOMATED COUNT: 13.4 % (ref 10–15)
FRAGMENTS BLD QL SMEAR: ABNORMAL
GLUCOSE SERPL-MCNC: 121 MG/DL (ref 70–99)
HCO3 BLDV-SCNC: 28 MMOL/L (ref 21–28)
HCT VFR BLD AUTO: 44.9 % (ref 40–53)
HGB BLD-MCNC: 14 G/DL (ref 13.3–17.7)
HGB C CRYSTALS: ABNORMAL
HOWELL-JOLLY BOD BLD QL SMEAR: ABNORMAL
LACTATE BLD-SCNC: 1.8 MMOL/L
MCH RBC QN AUTO: 28.6 PG (ref 26.5–33)
MCHC RBC AUTO-ENTMCNC: 31.2 G/DL (ref 31.5–36.5)
MCV RBC AUTO: 92 FL (ref 78–100)
NEUTS HYPERSEG BLD QL SMEAR: ABNORMAL
PCO2 BLDV: 42 MM HG (ref 40–50)
PH BLDV: 7.43 [PH] (ref 7.32–7.43)
PHOSPHATE SERPL-MCNC: 3.2 MG/DL (ref 2.5–4.5)
PLAT MORPH BLD: ABNORMAL
PLATELET # BLD AUTO: ABNORMAL 10*3/UL
PO2 BLDV: 42 MM HG (ref 25–47)
POLYCHROMASIA BLD QL SMEAR: ABNORMAL
POTASSIUM SERPL-SCNC: 3.4 MMOL/L (ref 3.4–5.3)
RBC # BLD AUTO: 4.9 10E6/UL (ref 4.4–5.9)
RBC AGGLUT BLD QL: ABNORMAL
RBC MORPH BLD: ABNORMAL
ROULEAUX BLD QL SMEAR: ABNORMAL
SAO2 % BLDV: 78 % (ref 70–75)
SICKLE CELLS BLD QL SMEAR: ABNORMAL
SMUDGE CELLS BLD QL SMEAR: ABNORMAL
SODIUM SERPL-SCNC: 144 MMOL/L (ref 135–145)
SPHEROCYTES BLD QL SMEAR: ABNORMAL
STOMATOCYTES BLD QL SMEAR: ABNORMAL
TARGETS BLD QL SMEAR: ABNORMAL
TOXIC GRANULES BLD QL SMEAR: ABNORMAL
TROPONIN T SERPL HS-MCNC: 55 NG/L
VARIANT LYMPHS BLD QL SMEAR: ABNORMAL
WBC # BLD AUTO: 11 10E3/UL (ref 4–11)

## 2024-01-22 PROCEDURE — 258N000003 HC RX IP 258 OP 636: Performed by: INTERNAL MEDICINE

## 2024-01-22 PROCEDURE — 85048 AUTOMATED LEUKOCYTE COUNT: CPT | Performed by: INTERNAL MEDICINE

## 2024-01-22 PROCEDURE — 250N000013 HC RX MED GY IP 250 OP 250 PS 637: Performed by: HOSPITALIST

## 2024-01-22 PROCEDURE — 210N000002 HC R&B HEART CARE

## 2024-01-22 PROCEDURE — 80048 BASIC METABOLIC PNL TOTAL CA: CPT | Performed by: INTERNAL MEDICINE

## 2024-01-22 PROCEDURE — 84484 ASSAY OF TROPONIN QUANT: CPT | Performed by: INTERNAL MEDICINE

## 2024-01-22 PROCEDURE — 82803 BLOOD GASES ANY COMBINATION: CPT

## 2024-01-22 PROCEDURE — 250N000009 HC RX 250: Performed by: INTERNAL MEDICINE

## 2024-01-22 PROCEDURE — 36415 COLL VENOUS BLD VENIPUNCTURE: CPT | Performed by: INTERNAL MEDICINE

## 2024-01-22 PROCEDURE — 250N000011 HC RX IP 250 OP 636: Performed by: INTERNAL MEDICINE

## 2024-01-22 PROCEDURE — 250N000013 HC RX MED GY IP 250 OP 250 PS 637: Performed by: EMERGENCY MEDICINE

## 2024-01-22 PROCEDURE — 250N000013 HC RX MED GY IP 250 OP 250 PS 637: Performed by: INTERNAL MEDICINE

## 2024-01-22 PROCEDURE — 250N000011 HC RX IP 250 OP 636: Performed by: HOSPITALIST

## 2024-01-22 PROCEDURE — 84100 ASSAY OF PHOSPHORUS: CPT | Performed by: INTERNAL MEDICINE

## 2024-01-22 PROCEDURE — 99233 SBSQ HOSP IP/OBS HIGH 50: CPT | Performed by: HOSPITALIST

## 2024-01-22 RX ORDER — AMOXICILLIN 250 MG
2 CAPSULE ORAL 2 TIMES DAILY PRN
Status: DISCONTINUED | OUTPATIENT
Start: 2024-01-22 | End: 2024-01-23

## 2024-01-22 RX ORDER — LIDOCAINE 40 MG/G
CREAM TOPICAL
Status: DISCONTINUED | OUTPATIENT
Start: 2024-01-22 | End: 2024-01-26 | Stop reason: HOSPADM

## 2024-01-22 RX ORDER — ONDANSETRON 4 MG/1
4 TABLET, ORALLY DISINTEGRATING ORAL EVERY 6 HOURS PRN
Status: DISCONTINUED | OUTPATIENT
Start: 2024-01-22 | End: 2024-01-26 | Stop reason: HOSPADM

## 2024-01-22 RX ORDER — DEXTROSE MONOHYDRATE 100 MG/ML
INJECTION, SOLUTION INTRAVENOUS CONTINUOUS PRN
Status: DISCONTINUED | OUTPATIENT
Start: 2024-01-22 | End: 2024-01-26 | Stop reason: HOSPADM

## 2024-01-22 RX ORDER — LEVOTHYROXINE SODIUM 112 UG/1
112 TABLET ORAL DAILY
Status: DISCONTINUED | OUTPATIENT
Start: 2024-01-22 | End: 2024-01-23

## 2024-01-22 RX ORDER — ALBUTEROL SULFATE 0.83 MG/ML
2.5 SOLUTION RESPIRATORY (INHALATION)
Status: DISCONTINUED | OUTPATIENT
Start: 2024-01-22 | End: 2024-01-26 | Stop reason: HOSPADM

## 2024-01-22 RX ORDER — ONDANSETRON 2 MG/ML
4 INJECTION INTRAMUSCULAR; INTRAVENOUS EVERY 6 HOURS PRN
Status: DISCONTINUED | OUTPATIENT
Start: 2024-01-22 | End: 2024-01-26 | Stop reason: HOSPADM

## 2024-01-22 RX ORDER — ACETAMINOPHEN 325 MG/1
650 TABLET ORAL EVERY 4 HOURS PRN
Status: DISCONTINUED | OUTPATIENT
Start: 2024-01-22 | End: 2024-01-23

## 2024-01-22 RX ORDER — AMOXICILLIN 250 MG
1 CAPSULE ORAL 2 TIMES DAILY PRN
Status: DISCONTINUED | OUTPATIENT
Start: 2024-01-22 | End: 2024-01-23

## 2024-01-22 RX ORDER — POLYETHYLENE GLYCOL 3350 17 G/17G
17 POWDER, FOR SOLUTION ORAL 2 TIMES DAILY PRN
Status: DISCONTINUED | OUTPATIENT
Start: 2024-01-22 | End: 2024-01-23

## 2024-01-22 RX ORDER — ENOXAPARIN SODIUM 100 MG/ML
40 INJECTION SUBCUTANEOUS EVERY 24 HOURS
Status: DISCONTINUED | OUTPATIENT
Start: 2024-01-22 | End: 2024-01-26 | Stop reason: HOSPADM

## 2024-01-22 RX ORDER — PROCHLORPERAZINE 25 MG
25 SUPPOSITORY, RECTAL RECTAL EVERY 12 HOURS PRN
Status: DISCONTINUED | OUTPATIENT
Start: 2024-01-22 | End: 2024-01-23

## 2024-01-22 RX ORDER — METOCLOPRAMIDE HYDROCHLORIDE 5 MG/5ML
10 SOLUTION ORAL
Status: DISCONTINUED | OUTPATIENT
Start: 2024-01-22 | End: 2024-01-23

## 2024-01-22 RX ORDER — CALCIUM CARBONATE 500 MG/1
1000 TABLET, CHEWABLE ORAL 4 TIMES DAILY PRN
Status: DISCONTINUED | OUTPATIENT
Start: 2024-01-22 | End: 2024-01-23

## 2024-01-22 RX ORDER — MULTIVITAMIN,THERAPEUTIC
1 TABLET ORAL DAILY
Status: DISCONTINUED | OUTPATIENT
Start: 2024-01-22 | End: 2024-01-26 | Stop reason: HOSPADM

## 2024-01-22 RX ORDER — LEVOFLOXACIN 5 MG/ML
750 INJECTION, SOLUTION INTRAVENOUS EVERY 24 HOURS
Status: DISCONTINUED | OUTPATIENT
Start: 2024-01-22 | End: 2024-01-22

## 2024-01-22 RX ORDER — PIPERACILLIN SODIUM, TAZOBACTAM SODIUM 4; .5 G/20ML; G/20ML
4.5 INJECTION, POWDER, LYOPHILIZED, FOR SOLUTION INTRAVENOUS EVERY 6 HOURS
Status: DISCONTINUED | OUTPATIENT
Start: 2024-01-22 | End: 2024-01-24

## 2024-01-22 RX ORDER — ACETAMINOPHEN 650 MG/1
650 SUPPOSITORY RECTAL EVERY 4 HOURS PRN
Status: DISCONTINUED | OUTPATIENT
Start: 2024-01-22 | End: 2024-01-23

## 2024-01-22 RX ORDER — PROCHLORPERAZINE MALEATE 5 MG
10 TABLET ORAL EVERY 6 HOURS PRN
Status: DISCONTINUED | OUTPATIENT
Start: 2024-01-22 | End: 2024-01-23

## 2024-01-22 RX ORDER — BISACODYL 10 MG
10 SUPPOSITORY, RECTAL RECTAL DAILY PRN
Status: DISCONTINUED | OUTPATIENT
Start: 2024-01-22 | End: 2024-01-26 | Stop reason: HOSPADM

## 2024-01-22 RX ORDER — GLYCOPYRROLATE 1 MG/1
1 TABLET ORAL 2 TIMES DAILY PRN
Status: DISCONTINUED | OUTPATIENT
Start: 2024-01-22 | End: 2024-01-23

## 2024-01-22 RX ADMIN — CARBAMAZEPINE 150 MG: 100 SUSPENSION ORAL at 13:38

## 2024-01-22 RX ADMIN — LEVOFLOXACIN 750 MG: 5 INJECTION, SOLUTION INTRAVENOUS at 05:02

## 2024-01-22 RX ADMIN — HYDROCORTISONE SODIUM SUCCINATE 50 MG: 100 INJECTION, POWDER, FOR SOLUTION INTRAMUSCULAR; INTRAVENOUS at 22:07

## 2024-01-22 RX ADMIN — CARBAMAZEPINE 100 MG: 100 SUSPENSION ORAL at 18:39

## 2024-01-22 RX ADMIN — ENOXAPARIN SODIUM 40 MG: 40 INJECTION SUBCUTANEOUS at 10:45

## 2024-01-22 RX ADMIN — Medication 20 MG: at 10:36

## 2024-01-22 RX ADMIN — METOCLOPRAMIDE HYDROCHLORIDE 10 MG: 5 SOLUTION ORAL at 10:37

## 2024-01-22 RX ADMIN — BRIVARACETAM 100 MG: 10 SOLUTION ORAL at 22:07

## 2024-01-22 RX ADMIN — CARBAMAZEPINE 150 MG: 100 SUSPENSION ORAL at 06:30

## 2024-01-22 RX ADMIN — PIPERACILLIN AND TAZOBACTAM 4.5 G: 4; .5 INJECTION, POWDER, FOR SOLUTION INTRAVENOUS at 18:18

## 2024-01-22 RX ADMIN — METOCLOPRAMIDE HYDROCHLORIDE 10 MG: 5 SOLUTION ORAL at 22:07

## 2024-01-22 RX ADMIN — HYDROCORTISONE SODIUM SUCCINATE 50 MG: 100 INJECTION, POWDER, FOR SOLUTION INTRAMUSCULAR; INTRAVENOUS at 10:45

## 2024-01-22 RX ADMIN — BRIVARACETAM 100 MG: 10 SOLUTION ORAL at 10:36

## 2024-01-22 RX ADMIN — VANCOMYCIN HYDROCHLORIDE 1500 MG: 10 INJECTION, POWDER, LYOPHILIZED, FOR SOLUTION INTRAVENOUS at 03:34

## 2024-01-22 RX ADMIN — PIPERACILLIN AND TAZOBACTAM 4.5 G: 4; .5 INJECTION, POWDER, FOR SOLUTION INTRAVENOUS at 22:07

## 2024-01-22 RX ADMIN — LEVOTHYROXINE SODIUM 112 MCG: 112 TABLET ORAL at 10:35

## 2024-01-22 RX ADMIN — MICONAZOLE NITRATE 1 G: 2 POWDER TOPICAL at 22:16

## 2024-01-22 RX ADMIN — METOCLOPRAMIDE HYDROCHLORIDE 10 MG: 5 SOLUTION ORAL at 17:45

## 2024-01-22 RX ADMIN — SODIUM CHLORIDE: 9 INJECTION, SOLUTION INTRAVENOUS at 13:33

## 2024-01-22 RX ADMIN — Medication 60 ML: at 13:38

## 2024-01-22 RX ADMIN — MULTIVITAMIN TABLET 1 TABLET: TABLET at 10:35

## 2024-01-22 RX ADMIN — SODIUM CHLORIDE: 9 INJECTION, SOLUTION INTRAVENOUS at 00:58

## 2024-01-22 RX ADMIN — HYDROCORTISONE SODIUM SUCCINATE 50 MG: 100 INJECTION, POWDER, FOR SOLUTION INTRAMUSCULAR; INTRAVENOUS at 17:45

## 2024-01-22 ASSESSMENT — ACTIVITIES OF DAILY LIVING (ADL)
ADLS_ACUITY_SCORE: 41
ADLS_ACUITY_SCORE: 49
ADLS_ACUITY_SCORE: 49
ADLS_ACUITY_SCORE: 53
ADLS_ACUITY_SCORE: 49
ADLS_ACUITY_SCORE: 49
ADLS_ACUITY_SCORE: 41
ADLS_ACUITY_SCORE: 37
ADLS_ACUITY_SCORE: 37
ADLS_ACUITY_SCORE: 41
ADLS_ACUITY_SCORE: 41
ADLS_ACUITY_SCORE: 49

## 2024-01-22 NOTE — CONSULTS
"CLINICAL NUTRITION SERVICES  -  ASSESSMENT NOTE    Recommendations Ordered by Registered Dietitian (RD):   - Weigh patient  - Phos Add-On    - Start TF:   Jevity 1.5 @ 55 mL/hr x 22 hours (hold for Levothyroxine)  - Add expedite daily for wound healing    Total Provisions = 1915 kcal, 87 g protein, 25 g fiber, 920 mL free water  Flush 60 mL q 4 hours. Increase to 160 mL q 4 hours once IVF off.     - Continue Thera vit daily      Malnutrition:   % Weight Loss:  Unable to fully assess. Expect none, though wt's tend to fluctuate  % Intake:  Decreased intake does not meet criteria for malnutrition  Subcutaneous Fat Loss: Low stores at baseline  Muscle Loss:  Low stores at baseline   Fluid Retention:  None noted    Malnutrition Diagnosis: Patient does not meet two of the above criteria necessary for diagnosing malnutrition     REASON FOR ASSESSMENT  Keyon Farias is a 63 year old male seen by Registered Dietitian for Provider Order - Registered Dietitian to Assess and Order TF per Medical Nutrition Therapy Protocol    NUTRITION HISTORY  - Information obtained from chart review.   - Pt is well-known to clinical nutrition services over the years. Most recently here end of December, and early January.   - He is TF reliant, GJT in place. J port is used for feeds.     - Home tube feed regimen is documented previously as follows:   5.5 cartons Jevity 1.5, to provide 1304 mL, 1935 kcal, 83 g protein, 281 g CHO, 27 g fiber.     -- Attempted to speak with patient's mother Savannah by phone, unable to reach.   - Pt seen in room.     - Pt with frequently low phos levels, at one time received NeutraPhos TID at baseline. Monitor.       CURRENT NUTRITION ORDERS  Diet Order:     NPO     Current Intake/Tolerance:  N/a    NUTRITION FOCUSED PHYSICAL ASSESSMENT FOR DIAGNOSING MALNUTRITION)  Yes         Observed:    Low fat/muscle stores - baseline    Obtained from Chart/Interdisciplinary Team:  ED physician indicates \"cachectic\" appearance, " "\"atrophy to extremities\" - expected in patient who is largely bed bound.   - Dry mucous membranes     WOCN consult pending for sacrum wound.     11/7 - Last known GJT replacement     ANTHROPOMETRICS  Height: 5' 10\"   Weight: No current wt on file. Measured 68.5 kg on 1/11.   BMI not validated in hemiplegia   IBW: ~63 kg - adjusted for hemiplegia  % IBW: 109%  Weight History:   Wt Readings from Last 10 Encounters:   01/11/24 68.5 kg (151 lb)   12/30/23 68.5 kg (151 lb 0.2 oz)   12/21/23 75.3 kg (166 lb 0.1 oz)   12/01/23 69.8 kg (153 lb 14.1 oz)   11/26/23 74.8 kg (165 lb)   11/07/23 74.8 kg (165 lb)   10/26/23 70.8 kg (156 lb 1.4 oz)   10/04/23 83.9 kg (185 lb)   10/03/23 84.2 kg (185 lb 10 oz)   09/21/23 77.1 kg (170 lb)       LABS  Labs reviewed  BUN 26.4 (H)  Recent Labs   Lab 01/22/24  0307 01/21/24  1618   * 178*     Lab Results   Component Value Date    A1C 5.9 09/26/2023    A1C 5.6 11/20/2022    A1C 5.5 05/31/2022    A1C 5.6 05/23/2021    A1C 5.9 08/12/2020    A1C 6.1 08/12/2019    A1C 6.1 05/07/2019    A1C 6.3 11/22/2018     MEDICATIONS  Medications reviewed  Levothyroxine via FT @ 0900  Reglan 4x daily  Thera vit daily   NaCl IVF @ 125 mL/hr     ASSESSED NUTRITION NEEDS PER APPROVED PRACTICE GUIDELINES:  Dosing Weight 68.5 kg - measured 1/11  Estimated Energy Needs: 1183-1200 kcals (25-30 Kcal/Kg)  Justification: maintenance  Estimated Protein Needs:  grams protein (1.2-1.5 g pro/Kg)  Justification: preservation of lean body mass, wounds  Estimated Fluid Needs: 1 mL/kcal for maintenance, or per provider pending fluid status     MALNUTRITION:  % Weight Loss:  Unable to fully assess. Expect none, though wt's tend to fluctuate  % Intake:  Decreased intake does not meet criteria for malnutrition  Subcutaneous Fat Loss: Low stores at baseline  Muscle Loss:  Low stores at baseline   Fluid Retention:  None noted    Malnutrition Diagnosis: Patient does not meet two of the above criteria necessary for " diagnosing malnutrition    NUTRITION DIAGNOSIS:  Inadequate protein-energy intake related to transfer of care as evidenced by TF has been off since hospitalization yesterday.     NUTRITION INTERVENTIONS  Recommendations / Nutrition Prescription  - Weigh patient  - Phos Add-On    - Start TF:   Jevity 1.5 @ 55 mL/hr x 22 hours (hold for Levothyroxine)  - Add expedite daily for wound healing    Total Provisions = 1915 kcal, 87 g protein, 25 g fiber, 920 mL free water  Flush 60 mL q 4 hours. Increase to 160 mL q 4 hours once IVF off.     - Continue Thera vit daily     Implementation  Nutrition education: Per Provider order if indicated   EN Composition, EN Schedule, Feeding Tube Flush: ordered  Collaboration and Referral of Nutrition care: d/w RN    Nutrition Goals  TF @ goal to provide % estimated needs.     MONITORING AND EVALUATION:  Progress towards goals will be monitored and evaluated per protocol and Practice Guidelines    Marisel Fernández RD, LD  Pager: [phone]  Weekend Pager: [phone]
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
## 2024-01-22 NOTE — PLAN OF CARE
RECEIVING UNIT ED HANDOFF REVIEW    ED Nurse Handoff Report was reviewed by: Zheng Okeefe RN on January 22, 2024 at 1:49 AM
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
## 2024-01-22 NOTE — PLAN OF CARE
Orientation: Non-verbal, trying to communicate by pointing at objects. Follows commands to roll when changing but refused oral care. Repositioning self in bed, pulling off positioning pillows. Right side weakness    Vitals/Tele: /73   Pulse 90   Temp 97.4  F (36.3  C) (Axillary)   Resp 18   SpO2 98%   On room air    IV Access/drains: PIV with NS at 125 ml/hr. Developed vancomycin red man syndrome when vancomycin was infusing at the ordered rate, provider notified and ordered to continue infusing at half the ordered rate. Patient tolerated after rate adjustment.  Peg tube - Draining to gravity on admission    Diet: NPO    Mobility: Wheel chair bound    GI/: Incontinent for stool and urine. Pulled off external catheter    Wound/Skin: Sacral pressure sore, WOC consulted. Scattered redness and bruising to bilateral legs and groin    Discharge Plan: TBD      See Flow sheets for assessment   Zheng Okeefe RN on 1/22/2024 at 6:58 AM

## 2024-01-22 NOTE — ED NOTES
[facility]  ED Nurse Handoff Report    ED Chief complaint: Fever      ED Diagnosis:   Final diagnoses:   Fever, unspecified fever cause   Hypoxia       Code Status: Full Code    Allergies:   Allergies   Allergen Reactions    Phenytoin Sodium Other (See Comments)     Shaking violently   Tremors    Valproic Acid Other (See Comments)     Toxicity w/ bone marrow suspension, elevated ammonia levels     Scopolamine Hives     Hives with the patch - oral no problem       Patient Story: fever  Focused Assessment:  sent in from home by mother for fever of 101, pt has a cough with lots of mucous. Oxygen applied in ED for low oxygen saturations    Treatments and/or interventions provided: see mar  Patient's response to treatments and/or interventions: good    To be done/followed up on inpatient unit:  monitor fevers    Does this patient have any cognitive concerns?:  doesn't really talk    Activity level - Baseline/Home:  Total Care  Activity Level - Current:   Total Care    Patient's Preferred language: English   Needed?: No    Isolation: None and Contact   Infection: Not Applicable  MRSA  VRE  Patient tested for COVID 19 prior to admission: NO  Bariatric?: No    Vital Signs:   Vitals:    01/21/24 1602 01/21/24 1615 01/21/24 1715 01/21/24 1730   BP: 102/70 111/79 (!) 123/92 107/76   Pulse: 119  114 104   Resp: 22      Temp: 99.1  F (37.3  C)      TempSrc: Axillary      SpO2: 97% 91% 99% 98%       Cardiac Rhythm:     Was the PSS-3 completed:   Yes  What interventions are required if any?               Family Comments: none here  OBS brochure/video discussed/provided to patient/family: N/A              Name of person given brochure if not patient: NA              Relationship to patient: NA    For the majority of the shift this patient's behavior was Green.   Behavioral interventions performed were NA.    ED NURSE PHONE NUMBER: *[phone]
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
## 2024-01-22 NOTE — TELEPHONE ENCOUNTER
This nurse left detailed voice message for Bisi , that patient request for speech therapy evaluation has been approved.

## 2024-01-22 NOTE — PHARMACY-VANCOMYCIN DOSING SERVICE
Pharmacy Vancomycin Initial Note  Date of Service 2024  Patient's  1960  63 year old, male    Indication: Healthcare-Associated Pneumonia    Current estimated CrCl = Estimated Creatinine Clearance: 74.4 mL/min (based on SCr of 1.01 mg/dL).    Creatinine for last 3 days  2024:  4:18 PM Creatinine 1.03 mg/dL  2024:  3:07 AM Creatinine 1.01 mg/dL    Recent Vancomycin Level(s) for last 3 days  No results found for requested labs within last 3 days.      Vancomycin IV Administrations (past 72 hours)                     vancomycin (VANCOCIN) 1,500 mg in 0.9% NaCl 250 mL intermittent infusion ()  Restarted 24 0530     1,500 mg New Bag  0334                    Nephrotoxins and other renal medications (From now, onward)      Start     Dose/Rate Route Frequency Ordered Stop    24 0300  vancomycin (VANCOCIN) 1,750 mg in 0.9% NaCl 500 mL intermittent infusion         1,750 mg  over 4 Hours Intravenous EVERY 24 HOURS 24 0751              Contrast Orders - past 72 hours (72h ago, onward)      None            Insight[employer] Prediction of Planned Initial Vancomycin Regimen  Loading dose: 1500 mg IV x1  Regimen: 1750 mg IV every 24 hours.  Start time: 03:00 on 2024  Exposure target: AUC24 (range)400-600 mg/L.hr   AUC24,ss: 554 mg/L.hr  Probability of AUC24 > 400: 83 %  Ctrough,ss: 15.4 mg/L  Probability of Ctrough,ss > 20: 29 %  Probability of nephrotoxicity (Lodise ERNESTO ): 11 %          Plan:  Start vancomycin 1750 mg IV q24h.   Vancomycin monitoring method: AUC  Vancomycin therapeutic monitoring goal: 400-600 mg*h/L  Pharmacy will check vancomycin levels as appropriate in 1-3 Days.    Serum creatinine levels will be ordered every 48 hours.      Christa Pierson [facility]

## 2024-01-23 ENCOUNTER — APPOINTMENT (OUTPATIENT)
Dept: GENERAL RADIOLOGY | Facility: CLINIC | Age: 64
DRG: 871 | End: 2024-01-23
Attending: HOSPITALIST
Payer: MEDICARE

## 2024-01-23 LAB
ANION GAP SERPL CALCULATED.3IONS-SCNC: 6 MMOL/L (ref 7–15)
BASOPHILS # BLD AUTO: 0.1 10E3/UL (ref 0–0.2)
BASOPHILS NFR BLD AUTO: 1 %
BUN SERPL-MCNC: 20.1 MG/DL (ref 8–23)
CALCIUM SERPL-MCNC: 8 MG/DL (ref 8.8–10.2)
CHLORIDE SERPL-SCNC: 106 MMOL/L (ref 98–107)
CREAT SERPL-MCNC: 0.94 MG/DL (ref 0.67–1.17)
CRP SERPL-MCNC: 42.52 MG/L
DEPRECATED HCO3 PLAS-SCNC: 30 MMOL/L (ref 22–29)
EGFRCR SERPLBLD CKD-EPI 2021: >90 ML/MIN/1.73M2
EOSINOPHIL # BLD AUTO: 0.8 10E3/UL (ref 0–0.7)
EOSINOPHIL NFR BLD AUTO: 13 %
ERYTHROCYTE [DISTWIDTH] IN BLOOD BY AUTOMATED COUNT: 13.2 % (ref 10–15)
GLUCOSE SERPL-MCNC: 129 MG/DL (ref 70–99)
HCT VFR BLD AUTO: 36.7 % (ref 40–53)
HGB BLD-MCNC: 11.4 G/DL (ref 13.3–17.7)
IMM GRANULOCYTES # BLD: 0 10E3/UL
IMM GRANULOCYTES NFR BLD: 0 %
LYMPHOCYTES # BLD AUTO: 1.8 10E3/UL (ref 0.8–5.3)
LYMPHOCYTES NFR BLD AUTO: 29 %
MAGNESIUM SERPL-MCNC: 2.4 MG/DL (ref 1.7–2.3)
MCH RBC QN AUTO: 28.6 PG (ref 26.5–33)
MCHC RBC AUTO-ENTMCNC: 31.1 G/DL (ref 31.5–36.5)
MCV RBC AUTO: 92 FL (ref 78–100)
MONOCYTES # BLD AUTO: 0.6 10E3/UL (ref 0–1.3)
MONOCYTES NFR BLD AUTO: 9 %
NEUTROPHILS # BLD AUTO: 3 10E3/UL (ref 1.6–8.3)
NEUTROPHILS NFR BLD AUTO: 48 %
NRBC # BLD AUTO: 0 10E3/UL
NRBC BLD AUTO-RTO: 0 /100
PHOSPHATE SERPL-MCNC: 2.5 MG/DL (ref 2.5–4.5)
PLATELET # BLD AUTO: 134 10E3/UL (ref 150–450)
POTASSIUM SERPL-SCNC: 3.3 MMOL/L (ref 3.4–5.3)
POTASSIUM SERPL-SCNC: 4.2 MMOL/L (ref 3.4–5.3)
RBC # BLD AUTO: 3.98 10E6/UL (ref 4.4–5.9)
SODIUM SERPL-SCNC: 142 MMOL/L (ref 135–145)
WBC # BLD AUTO: 6.3 10E3/UL (ref 4–11)

## 2024-01-23 PROCEDURE — 83735 ASSAY OF MAGNESIUM: CPT | Performed by: INTERNAL MEDICINE

## 2024-01-23 PROCEDURE — 84100 ASSAY OF PHOSPHORUS: CPT | Performed by: INTERNAL MEDICINE

## 2024-01-23 PROCEDURE — 250N000011 HC RX IP 250 OP 636: Performed by: INTERNAL MEDICINE

## 2024-01-23 PROCEDURE — 85025 COMPLETE CBC W/AUTO DIFF WBC: CPT | Performed by: HOSPITALIST

## 2024-01-23 PROCEDURE — 250N000009 HC RX 250: Performed by: INTERNAL MEDICINE

## 2024-01-23 PROCEDURE — 120N000001 HC R&B MED SURG/OB

## 2024-01-23 PROCEDURE — 36415 COLL VENOUS BLD VENIPUNCTURE: CPT | Performed by: INTERNAL MEDICINE

## 2024-01-23 PROCEDURE — 36415 COLL VENOUS BLD VENIPUNCTURE: CPT | Performed by: HOSPITALIST

## 2024-01-23 PROCEDURE — 258N000003 HC RX IP 258 OP 636: Performed by: INTERNAL MEDICINE

## 2024-01-23 PROCEDURE — 84132 ASSAY OF SERUM POTASSIUM: CPT | Performed by: HOSPITALIST

## 2024-01-23 PROCEDURE — 80048 BASIC METABOLIC PNL TOTAL CA: CPT | Performed by: INTERNAL MEDICINE

## 2024-01-23 PROCEDURE — 250N000013 HC RX MED GY IP 250 OP 250 PS 637: Performed by: EMERGENCY MEDICINE

## 2024-01-23 PROCEDURE — 86140 C-REACTIVE PROTEIN: CPT | Performed by: HOSPITALIST

## 2024-01-23 PROCEDURE — 250N000013 HC RX MED GY IP 250 OP 250 PS 637: Performed by: INTERNAL MEDICINE

## 2024-01-23 PROCEDURE — 250N000013 HC RX MED GY IP 250 OP 250 PS 637: Performed by: HOSPITALIST

## 2024-01-23 PROCEDURE — 99232 SBSQ HOSP IP/OBS MODERATE 35: CPT | Performed by: HOSPITALIST

## 2024-01-23 PROCEDURE — 250N000011 HC RX IP 250 OP 636: Performed by: HOSPITALIST

## 2024-01-23 PROCEDURE — 73650 X-RAY EXAM OF HEEL: CPT | Mod: LT

## 2024-01-23 RX ORDER — POLYETHYLENE GLYCOL 3350 17 G/17G
17 POWDER, FOR SOLUTION ORAL 2 TIMES DAILY PRN
Status: DISCONTINUED | OUTPATIENT
Start: 2024-01-23 | End: 2024-01-26 | Stop reason: HOSPADM

## 2024-01-23 RX ORDER — AMOXICILLIN 250 MG
1 CAPSULE ORAL 2 TIMES DAILY PRN
Status: DISCONTINUED | OUTPATIENT
Start: 2024-01-23 | End: 2024-01-26 | Stop reason: HOSPADM

## 2024-01-23 RX ORDER — LEVOTHYROXINE SODIUM 112 UG/1
112 TABLET ORAL DAILY
Status: DISCONTINUED | OUTPATIENT
Start: 2024-01-23 | End: 2024-01-26 | Stop reason: HOSPADM

## 2024-01-23 RX ORDER — GLYCOPYRROLATE 1 MG/1
1 TABLET ORAL 2 TIMES DAILY PRN
Status: DISCONTINUED | OUTPATIENT
Start: 2024-01-23 | End: 2024-01-26 | Stop reason: HOSPADM

## 2024-01-23 RX ORDER — POTASSIUM CHLORIDE 1.5 G/1.58G
40 POWDER, FOR SOLUTION ORAL ONCE
Status: COMPLETED | OUTPATIENT
Start: 2024-01-23 | End: 2024-01-23

## 2024-01-23 RX ORDER — ACETAMINOPHEN 325 MG/1
650 TABLET ORAL EVERY 4 HOURS PRN
Status: DISCONTINUED | OUTPATIENT
Start: 2024-01-23 | End: 2024-01-26 | Stop reason: HOSPADM

## 2024-01-23 RX ORDER — AMOXICILLIN 250 MG
2 CAPSULE ORAL 2 TIMES DAILY PRN
Status: DISCONTINUED | OUTPATIENT
Start: 2024-01-23 | End: 2024-01-26 | Stop reason: HOSPADM

## 2024-01-23 RX ORDER — PROCHLORPERAZINE MALEATE 10 MG
10 TABLET ORAL EVERY 6 HOURS PRN
Status: DISCONTINUED | OUTPATIENT
Start: 2024-01-23 | End: 2024-01-26 | Stop reason: HOSPADM

## 2024-01-23 RX ORDER — CALCIUM CARBONATE 500 MG/1
1000 TABLET, CHEWABLE ORAL 4 TIMES DAILY PRN
Status: DISCONTINUED | OUTPATIENT
Start: 2024-01-23 | End: 2024-01-26 | Stop reason: HOSPADM

## 2024-01-23 RX ORDER — PROCHLORPERAZINE 25 MG
25 SUPPOSITORY, RECTAL RECTAL EVERY 12 HOURS PRN
Status: DISCONTINUED | OUTPATIENT
Start: 2024-01-23 | End: 2024-01-26 | Stop reason: HOSPADM

## 2024-01-23 RX ORDER — ACETAMINOPHEN 650 MG/1
650 SUPPOSITORY RECTAL EVERY 4 HOURS PRN
Status: DISCONTINUED | OUTPATIENT
Start: 2024-01-23 | End: 2024-01-26 | Stop reason: HOSPADM

## 2024-01-23 RX ORDER — METOCLOPRAMIDE HYDROCHLORIDE 5 MG/5ML
10 SOLUTION ORAL
Status: DISCONTINUED | OUTPATIENT
Start: 2024-01-23 | End: 2024-01-26 | Stop reason: HOSPADM

## 2024-01-23 RX ORDER — SODIUM CHLORIDE 9 MG/ML
INJECTION, SOLUTION INTRAVENOUS CONTINUOUS
Status: ACTIVE | OUTPATIENT
Start: 2024-01-23 | End: 2024-01-24

## 2024-01-23 RX ADMIN — CARBAMAZEPINE 100 MG: 100 SUSPENSION ORAL at 19:05

## 2024-01-23 RX ADMIN — METOCLOPRAMIDE HYDROCHLORIDE 10 MG: 5 SOLUTION ORAL at 06:32

## 2024-01-23 RX ADMIN — POTASSIUM CHLORIDE 40 MEQ: 1.5 POWDER, FOR SOLUTION ORAL at 15:46

## 2024-01-23 RX ADMIN — HYDROCORTISONE SODIUM SUCCINATE 25 MG: 100 INJECTION, POWDER, FOR SOLUTION INTRAMUSCULAR; INTRAVENOUS at 22:05

## 2024-01-23 RX ADMIN — PIPERACILLIN AND TAZOBACTAM 4.5 G: 4; .5 INJECTION, POWDER, FOR SOLUTION INTRAVENOUS at 04:51

## 2024-01-23 RX ADMIN — PIPERACILLIN AND TAZOBACTAM 4.5 G: 4; .5 INJECTION, POWDER, FOR SOLUTION INTRAVENOUS at 15:46

## 2024-01-23 RX ADMIN — Medication 20 MG: at 06:32

## 2024-01-23 RX ADMIN — MULTIVITAMIN TABLET 1 TABLET: TABLET at 09:14

## 2024-01-23 RX ADMIN — METOCLOPRAMIDE HYDROCHLORIDE 10 MG: 5 SOLUTION ORAL at 10:51

## 2024-01-23 RX ADMIN — ENOXAPARIN SODIUM 40 MG: 40 INJECTION SUBCUTANEOUS at 09:15

## 2024-01-23 RX ADMIN — BRIVARACETAM 100 MG: 10 SOLUTION ORAL at 09:14

## 2024-01-23 RX ADMIN — CARBAMAZEPINE 150 MG: 100 SUSPENSION ORAL at 11:07

## 2024-01-23 RX ADMIN — SODIUM CHLORIDE: 9 INJECTION, SOLUTION INTRAVENOUS at 01:14

## 2024-01-23 RX ADMIN — PIPERACILLIN AND TAZOBACTAM 4.5 G: 4; .5 INJECTION, POWDER, FOR SOLUTION INTRAVENOUS at 22:07

## 2024-01-23 RX ADMIN — CARBAMAZEPINE 150 MG: 100 SUSPENSION ORAL at 01:06

## 2024-01-23 RX ADMIN — PIPERACILLIN AND TAZOBACTAM 4.5 G: 4; .5 INJECTION, POWDER, FOR SOLUTION INTRAVENOUS at 10:50

## 2024-01-23 RX ADMIN — CARBAMAZEPINE 150 MG: 100 SUSPENSION ORAL at 06:32

## 2024-01-23 RX ADMIN — HYDROCORTISONE SODIUM SUCCINATE 25 MG: 100 INJECTION, POWDER, FOR SOLUTION INTRAMUSCULAR; INTRAVENOUS at 15:47

## 2024-01-23 RX ADMIN — Medication 60 ML: at 10:51

## 2024-01-23 RX ADMIN — METOCLOPRAMIDE HYDROCHLORIDE 10 MG: 5 SOLUTION ORAL at 22:06

## 2024-01-23 RX ADMIN — MICONAZOLE NITRATE: 2 POWDER TOPICAL at 09:16

## 2024-01-23 RX ADMIN — HYDROCORTISONE SODIUM SUCCINATE 50 MG: 100 INJECTION, POWDER, FOR SOLUTION INTRAMUSCULAR; INTRAVENOUS at 09:15

## 2024-01-23 RX ADMIN — BRIVARACETAM 100 MG: 10 SOLUTION ORAL at 22:15

## 2024-01-23 RX ADMIN — LEVOTHYROXINE SODIUM 112 MCG: 112 TABLET ORAL at 09:14

## 2024-01-23 RX ADMIN — METOCLOPRAMIDE HYDROCHLORIDE 10 MG: 5 SOLUTION ORAL at 15:46

## 2024-01-23 RX ADMIN — MICONAZOLE NITRATE: 2 POWDER TOPICAL at 22:07

## 2024-01-23 ASSESSMENT — ACTIVITIES OF DAILY LIVING (ADL)
ADLS_ACUITY_SCORE: 53
ADLS_ACUITY_SCORE: 55
ADLS_ACUITY_SCORE: 53
DEPENDENT_IADLS:: CLEANING;COOKING;LAUNDRY;SHOPPING;MEAL PREPARATION;MEDICATION MANAGEMENT;MONEY MANAGEMENT;TRANSPORTATION;INCONTINENCE
ADLS_ACUITY_SCORE: 53
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
## 2024-01-23 NOTE — PLAN OF CARE
Neuro- HECTOR pt non verbal  Most Recent Vitals- Temp: 98.1  F (36.7  C) Temp src: Axillary BP: 128/71 Pulse: 96   Resp: 18 SpO2: 97 % O2 Device: None (Room air)   Tele/Cardiac- SR/ ST  Resp- LS dim on RA  Activity- bedrest/ WC bound  Pain- no s/sx of distress  Drips- NS, int abx  Drains/Tubes- PEG tube, PIV  Skin- scattered scratches and wound to coccyx, WOCN consulted  GI/- inc. Of bowel and bladder  Aggression Color- Green  COVID status- Negative  Plan- transfer to [facility].   [facility]-     Mireille Perez, RN Goal Outcome Evaluation:

## 2024-01-23 NOTE — CONSULTS
Care Management Initial Consult    General Information  Assessment completed with: VM-chart review, Parents,    Type of CM/SW Visit: Initial Assessment    Primary Care Provider verified and updated as needed: Yes   Readmission within the last 30 days: previous discharge plan unsuccessful      Reason for Consult: discharge planning  Advance Care Planning: Advance Care Planning Reviewed: present on chart, verified with patient          Communication Assessment  Patient's communication style: spoken language (English or Bilingual)             Cognitive  Cognitive/Neuro/Behavioral: .WDL except (non verbal)  Level of Consciousness: alert  Arousal Level: opens eyes spontaneously  Orientation:  (HECTOR)  Mood/Behavior: calm  Best Language: 3 - Mute  Speech: unable to speak    Living Environment:   People in home: parent(s), other (see comments) (PCA Keyon)     Current living Arrangements: house      Able to return to prior arrangements: yes       Family/Social Support:  Care provided by: parent(s), homecare agency, other (see comments) (PCA)  Provides care for: no one, unable/limited ability to care for self  Marital Status: Single  PCA, Parent(s)          Description of Support System: Supportive, Involved         Current Resources:   Patient receiving home care services: No     Community Resources: PCA  Equipment currently used at home:    Supplies currently used at home: Nebulizer tubing, Incontinence Supplies, Nutritional Supplements, Wound Care Supplies, Enteral Nutrition & Supplies, Wipes, Gloves    Employment/Financial:  Employment Status: disabled        Financial Concerns: none   Referral to Financial Worker: No       Does the patient's insurance plan have a 3 day qualifying hospital stay waiver?  No    Lifestyle & Psychosocial Needs:  Social Determinants of Health     Food Insecurity: Not on file   Depression: Not at risk (9/20/2023)    PHQ-2     PHQ-2 Score: 0   Housing Stability: Not on file   Tobacco Use: Medium  Risk (1/11/2024)    Patient History     Smoking Tobacco Use: Former     Smokeless Tobacco Use: Never     Passive Exposure: Not on file   Financial Resource Strain: Not on file   Alcohol Use: Not on file   Transportation Needs: Not on file   Physical Activity: Not on file   Interpersonal Safety: Not on file   Stress: Not on file   Social Connections: Not on file       Functional Status:  Prior to admission patient needed assistance:   Dependent ADLs:: Bathing, Dressing, Eating, Grooming, Incontinence, Positioning, Transfers, Wheelchair-with assist, Toileting  Dependent IADLs:: Cleaning, Cooking, Laundry, Shopping, Meal Preparation, Medication Management, Money Management, Transportation, Incontinence       Mental Health Status:          Chemical Dependency Status:                Values/Beliefs:  Spiritual, Cultural Beliefs, Methodist Practices, Values that affect care: no               Additional Information:  Writer spoke with patient's mom Savannah via phone and introduced self and role. Justyna is also the patient's legal guardian.     Patient lives with his mom and his PCA Keyon in a house with a ramp to enter.  Patient is total care and has a hospital bed, nidia lift, shower chair, manual and power wheelchair and handicapped accessible van.  Per Savannah, PCA is with patient during the night and is available during the day when needed. Pt. Is approved for 12.5 hrs per day of PCA services and 12 hrs of nursing care.  Savannah states that although he has been approved for nursing care for over a year, they have not found anyone to fill the hours.  Patient has a GJ tube feeding and medication administration via pt's J-tube.  Patient is primarily non-verbal but able to communicate his needs to his mom and Keyon.    Per Savannah, the only discharge option for patient is to return home with the above services and add homecare RN and HHA.  Savannah was very adamant  that patient will not go to TCU ever again.  Writer will make  referral to [facility].    Savannah requested that at discharge, patient be transported via stretcher back home.      Indira Stallworth RN Care Coordinator  [facility]  [phone]

## 2024-01-23 NOTE — PLAN OF CARE
Neuro- HECTOR, nonverbal  Most Recent Vitals- Temp: 97  F (36.1  C) Temp src: Axillary BP: 93/75 Pulse: 95   Resp: 20 SpO2: 97 % O2 Device: None (Room air)   Tele/Cardiac- SR/ST  Resp- LS dim  Activity- bed bound  Pain- none  Drips- NS and  int abx  Drains/Tubes- PIV, GJ tube  Skin- scattered scabbing and scratching  GI/- incont of B&B  Aggression Color- Green  COVID status- Negative  Plan- pending  Misc- pts IV infiltrated NS, area swollen warm pack applied    Mireille Perez, RN Goal Outcome Evaluation:

## 2024-01-24 LAB
ANION GAP SERPL CALCULATED.3IONS-SCNC: 8 MMOL/L (ref 7–15)
BASOPHILS # BLD AUTO: 0.1 10E3/UL (ref 0–0.2)
BASOPHILS NFR BLD AUTO: 1 %
BUN SERPL-MCNC: 13.8 MG/DL (ref 8–23)
CALCIUM SERPL-MCNC: 8.5 MG/DL (ref 8.8–10.2)
CHLORIDE SERPL-SCNC: 114 MMOL/L (ref 98–107)
CREAT SERPL-MCNC: 0.8 MG/DL (ref 0.67–1.17)
DEPRECATED HCO3 PLAS-SCNC: 30 MMOL/L (ref 22–29)
EGFRCR SERPLBLD CKD-EPI 2021: >90 ML/MIN/1.73M2
EOSINOPHIL # BLD AUTO: 0.4 10E3/UL (ref 0–0.7)
EOSINOPHIL NFR BLD AUTO: 4 %
ERYTHROCYTE [DISTWIDTH] IN BLOOD BY AUTOMATED COUNT: 13.2 % (ref 10–15)
GLUCOSE SERPL-MCNC: 76 MG/DL (ref 70–99)
HCT VFR BLD AUTO: 41 % (ref 40–53)
HGB BLD-MCNC: 12.4 G/DL (ref 13.3–17.7)
IMM GRANULOCYTES # BLD: 0 10E3/UL
IMM GRANULOCYTES NFR BLD: 0 %
LYMPHOCYTES # BLD AUTO: 2.3 10E3/UL (ref 0.8–5.3)
LYMPHOCYTES NFR BLD AUTO: 26 %
MAGNESIUM SERPL-MCNC: 2.5 MG/DL (ref 1.7–2.3)
MCH RBC QN AUTO: 28.3 PG (ref 26.5–33)
MCHC RBC AUTO-ENTMCNC: 30.2 G/DL (ref 31.5–36.5)
MCV RBC AUTO: 94 FL (ref 78–100)
MONOCYTES # BLD AUTO: 0.8 10E3/UL (ref 0–1.3)
MONOCYTES NFR BLD AUTO: 9 %
NEUTROPHILS # BLD AUTO: 5.3 10E3/UL (ref 1.6–8.3)
NEUTROPHILS NFR BLD AUTO: 60 %
NRBC # BLD AUTO: 0 10E3/UL
NRBC BLD AUTO-RTO: 0 /100
PHOSPHATE SERPL-MCNC: 2.1 MG/DL (ref 2.5–4.5)
PLATELET # BLD AUTO: 157 10E3/UL (ref 150–450)
POTASSIUM SERPL-SCNC: 3.6 MMOL/L (ref 3.4–5.3)
RBC # BLD AUTO: 4.38 10E6/UL (ref 4.4–5.9)
SODIUM SERPL-SCNC: 146 MMOL/L (ref 135–145)
SODIUM SERPL-SCNC: 152 MMOL/L (ref 135–145)
WBC # BLD AUTO: 8.9 10E3/UL (ref 4–11)

## 2024-01-24 PROCEDURE — 85025 COMPLETE CBC W/AUTO DIFF WBC: CPT | Performed by: HOSPITALIST

## 2024-01-24 PROCEDURE — 120N000001 HC R&B MED SURG/OB

## 2024-01-24 PROCEDURE — 250N000013 HC RX MED GY IP 250 OP 250 PS 637: Performed by: INTERNAL MEDICINE

## 2024-01-24 PROCEDURE — 36415 COLL VENOUS BLD VENIPUNCTURE: CPT | Performed by: INTERNAL MEDICINE

## 2024-01-24 PROCEDURE — 80048 BASIC METABOLIC PNL TOTAL CA: CPT | Performed by: INTERNAL MEDICINE

## 2024-01-24 PROCEDURE — 250N000013 HC RX MED GY IP 250 OP 250 PS 637: Performed by: EMERGENCY MEDICINE

## 2024-01-24 PROCEDURE — 250N000011 HC RX IP 250 OP 636: Performed by: INTERNAL MEDICINE

## 2024-01-24 PROCEDURE — 84100 ASSAY OF PHOSPHORUS: CPT | Performed by: INTERNAL MEDICINE

## 2024-01-24 PROCEDURE — 250N000013 HC RX MED GY IP 250 OP 250 PS 637: Performed by: HOSPITALIST

## 2024-01-24 PROCEDURE — 99232 SBSQ HOSP IP/OBS MODERATE 35: CPT | Performed by: HOSPITALIST

## 2024-01-24 PROCEDURE — 83735 ASSAY OF MAGNESIUM: CPT | Performed by: INTERNAL MEDICINE

## 2024-01-24 PROCEDURE — 36415 COLL VENOUS BLD VENIPUNCTURE: CPT | Performed by: HOSPITALIST

## 2024-01-24 PROCEDURE — 84295 ASSAY OF SERUM SODIUM: CPT | Performed by: HOSPITALIST

## 2024-01-24 PROCEDURE — 250N000011 HC RX IP 250 OP 636: Performed by: HOSPITALIST

## 2024-01-24 PROCEDURE — 250N000009 HC RX 250: Performed by: INTERNAL MEDICINE

## 2024-01-24 RX ORDER — AMOXICILLIN AND CLAVULANATE POTASSIUM 400; 57 MG/5ML; MG/5ML
500 POWDER, FOR SUSPENSION ORAL 2 TIMES DAILY
Qty: 62.5 ML | Refills: 0 | Status: DISCONTINUED | OUTPATIENT
Start: 2024-01-24 | End: 2024-01-26 | Stop reason: HOSPADM

## 2024-01-24 RX ORDER — AMOXICILLIN AND CLAVULANATE POTASSIUM 400; 57 MG/5ML; MG/5ML
500 POWDER, FOR SUSPENSION ORAL 2 TIMES DAILY
Status: DISCONTINUED | OUTPATIENT
Start: 2024-01-24 | End: 2024-01-24

## 2024-01-24 RX ADMIN — PIPERACILLIN AND TAZOBACTAM 4.5 G: 4; .5 INJECTION, POWDER, FOR SOLUTION INTRAVENOUS at 10:15

## 2024-01-24 RX ADMIN — POTASSIUM & SODIUM PHOSPHATES POWDER PACK 280-160-250 MG 1 PACKET: 280-160-250 PACK at 14:06

## 2024-01-24 RX ADMIN — METOCLOPRAMIDE HYDROCHLORIDE 10 MG: 5 SOLUTION ORAL at 21:20

## 2024-01-24 RX ADMIN — MICONAZOLE NITRATE: 2 POWDER TOPICAL at 21:27

## 2024-01-24 RX ADMIN — CARBAMAZEPINE 150 MG: 100 SUSPENSION ORAL at 11:23

## 2024-01-24 RX ADMIN — METOCLOPRAMIDE HYDROCHLORIDE 10 MG: 5 SOLUTION ORAL at 11:23

## 2024-01-24 RX ADMIN — ENOXAPARIN SODIUM 40 MG: 40 INJECTION SUBCUTANEOUS at 09:04

## 2024-01-24 RX ADMIN — METOCLOPRAMIDE HYDROCHLORIDE 10 MG: 5 SOLUTION ORAL at 08:11

## 2024-01-24 RX ADMIN — MULTIVITAMIN TABLET 1 TABLET: TABLET at 09:04

## 2024-01-24 RX ADMIN — PIPERACILLIN AND TAZOBACTAM 4.5 G: 4; .5 INJECTION, POWDER, FOR SOLUTION INTRAVENOUS at 05:35

## 2024-01-24 RX ADMIN — CARBAMAZEPINE 100 MG: 100 SUSPENSION ORAL at 18:31

## 2024-01-24 RX ADMIN — METOCLOPRAMIDE HYDROCHLORIDE 10 MG: 5 SOLUTION ORAL at 16:28

## 2024-01-24 RX ADMIN — AMOXICILLIN AND CLAVULANATE POTASSIUM 500 MG: 400; 57 POWDER, FOR SUSPENSION ORAL at 21:20

## 2024-01-24 RX ADMIN — CARBAMAZEPINE 150 MG: 100 SUSPENSION ORAL at 05:51

## 2024-01-24 RX ADMIN — Medication 20 MG: at 06:35

## 2024-01-24 RX ADMIN — BRIVARACETAM 100 MG: 10 SOLUTION ORAL at 09:05

## 2024-01-24 RX ADMIN — HYDROCORTISONE SODIUM SUCCINATE 25 MG: 100 INJECTION, POWDER, FOR SOLUTION INTRAMUSCULAR; INTRAVENOUS at 09:01

## 2024-01-24 RX ADMIN — BRIVARACETAM 100 MG: 10 SOLUTION ORAL at 22:26

## 2024-01-24 RX ADMIN — LEVOTHYROXINE SODIUM 112 MCG: 112 TABLET ORAL at 09:04

## 2024-01-24 RX ADMIN — Medication 60 ML: at 10:07

## 2024-01-24 RX ADMIN — MICONAZOLE NITRATE: 2 POWDER TOPICAL at 09:09

## 2024-01-24 RX ADMIN — CARBAMAZEPINE 150 MG: 100 SUSPENSION ORAL at 01:34

## 2024-01-24 RX ADMIN — POTASSIUM & SODIUM PHOSPHATES POWDER PACK 280-160-250 MG 1 PACKET: 280-160-250 PACK at 11:23

## 2024-01-24 RX ADMIN — POTASSIUM & SODIUM PHOSPHATES POWDER PACK 280-160-250 MG 1 PACKET: 280-160-250 PACK at 18:31

## 2024-01-24 RX ADMIN — HYDROCORTISONE 7.5 MG: 5 TABLET ORAL at 14:55

## 2024-01-24 RX ADMIN — AMOXICILLIN AND CLAVULANATE POTASSIUM 500 MG: 400; 57 POWDER, FOR SUSPENSION ORAL at 14:05

## 2024-01-24 ASSESSMENT — ACTIVITIES OF DAILY LIVING (ADL)
ADLS_ACUITY_SCORE: 55
ADLS_ACUITY_SCORE: 61
ADLS_ACUITY_SCORE: 59
ADLS_ACUITY_SCORE: 59
ADLS_ACUITY_SCORE: 61
ADLS_ACUITY_SCORE: 59
ADLS_ACUITY_SCORE: 55
ADLS_ACUITY_SCORE: 61
ADLS_ACUITY_SCORE: 59
ADLS_ACUITY_SCORE: 61
ADLS_ACUITY_SCORE: 61
ADLS_ACUITY_SCORE: 55
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
## 2024-01-24 NOTE — CONSULTS
CLINICAL NUTRITION SERVICES - BRIEF NOTE    - Provider Order - frequent admission for dehydration, has a venting G tube that is draining.  Possibly needs adjustment of the total volume of feeding formula   - Spoke w/ MD on phone. Writer already increased FWF this AM due to IVF being off and Na lab 152 (H). MD okay with  mL q 4hrs for today/tonight. Will re-address need to change FWF tomorrow if pt stays admitted.    Sun Abad RD, LD  Clinical Dietitian - [facility]

## 2024-01-24 NOTE — PLAN OF CARE
Goal Outcome Evaluation:      Plan of Care Reviewed With: patient, parent    DATE & TIME: 1/24/24 5954-8997   Cognitive Concerns/ Orientation : alert - HECTOR orientation   BEHAVIOR & AGGRESSION TOOL COLOR: green   ABNL VS/O2: VSS on RA   MOBILITY: Total - T/R   PAIN MANAGMENT: nonverbal signs of pain absent   DIET: NPO - TF running at goal rate 55 ml/hr - FWF q4h 160ml  BOWEL/BLADDER: incontinent of BM/bladder - brief and pad in place. Last BM 1/24  ABNL LAB/BG: phosphorus 2.1 mag 2.5   HGB 12.4,, lactic 2.6 (1/21)  DRAIN/DEVICES: PIV SL; Gastrostomy open to gravity drainage, Jejunostomy TF  SKIN: abrasion and excoriation to R-shin, sacrum, buttocks, scattered scabs and rush with minimal bruising; red blanchable area on sacrum, redness in perineal area  TESTS/PROCEDURES: none this shift  D/C DATE: possible 1/25  MORE INFO: amoxicillin started 1/24 - 2x Daily

## 2024-01-24 NOTE — PLAN OF CARE
Goal Outcome Evaluation:       DATE & TIME: 1/23/24 4122-9366   Cognitive Concerns/ Orientation : alert - HECTOR orientation   BEHAVIOR & AGGRESSION TOOL COLOR: green   ABNL VS/O2: VSS on RA   MOBILITY: Total - T/R   PAIN MANAGMENT: nonverbal signs of pain absent   DIET: NPO - TF running at goal rate 55 ml/hr - FWF q4h 60ml  BOWEL/BLADDER: incontinent of BM/bladder - external catheter in place  ABNL LAB/BG: K 4.2  HGB 11.4,, lactic 2.6 (1/21)  DRAIN/DEVICES: PIV infusing NS @75 ml/hr   SKIN: abrasion and excoriation to R-shin, sacrum, buttocks, scattered scabs and rush with minimal bruising,    TESTS/PROCEDURES: none this shift  D/C DATE: pending

## 2024-01-24 NOTE — PLAN OF CARE
Goal Outcome Evaluation:    DATE & TIME: 1700-2330 Sepsis d/t aspiration pneumonia   Cognitive Concerns/ Orientation : alert - HECTOR orientation   BEHAVIOR & AGGRESSION TOOL COLOR: green   ABNL VS/O2: VSS on RA   MOBILITY: Total - T/R   PAIN MANAGMENT: nonverbal signs of pain absent   DIET: NPO - TF running at goal rate 55 ml/hr - FWF q4h 60ml  BOWEL/BLADDER: incontinent of BM/bladder - external catheter in place - one soft BM this shift  ABNL LAB/BG: K 4.2  HGB 11.4,  DRAIN/DEVICES: PIV infusing NS @75 ml/hr   SKIN: abrasion and excoriation to R-shin, sacrum, buttocks, scattered scabs with minimal bruising   TESTS/PROCEDURES: none this shift  D/C DATE: pending

## 2024-01-25 LAB
ANION GAP SERPL CALCULATED.3IONS-SCNC: 9 MMOL/L (ref 7–15)
BUN SERPL-MCNC: 15.7 MG/DL (ref 8–23)
CALCIUM SERPL-MCNC: 8.6 MG/DL (ref 8.8–10.2)
CARBAMAZEPINE SERPL-MCNC: 8.7 UG/ML (ref 4–12)
CHLORIDE SERPL-SCNC: 107 MMOL/L (ref 98–107)
CREAT SERPL-MCNC: 0.71 MG/DL (ref 0.67–1.17)
CRP SERPL-MCNC: 16.12 MG/L
DEPRECATED HCO3 PLAS-SCNC: 29 MMOL/L (ref 22–29)
EGFRCR SERPLBLD CKD-EPI 2021: >90 ML/MIN/1.73M2
GLUCOSE SERPL-MCNC: 91 MG/DL (ref 70–99)
MAGNESIUM SERPL-MCNC: 2.4 MG/DL (ref 1.7–2.3)
PHOSPHATE SERPL-MCNC: 2.9 MG/DL (ref 2.5–4.5)
POTASSIUM SERPL-SCNC: 3.9 MMOL/L (ref 3.4–5.3)
SODIUM SERPL-SCNC: 145 MMOL/L (ref 135–145)

## 2024-01-25 PROCEDURE — 99232 SBSQ HOSP IP/OBS MODERATE 35: CPT | Performed by: HOSPITALIST

## 2024-01-25 PROCEDURE — 250N000011 HC RX IP 250 OP 636: Performed by: INTERNAL MEDICINE

## 2024-01-25 PROCEDURE — 80156 ASSAY CARBAMAZEPINE TOTAL: CPT | Performed by: HOSPITALIST

## 2024-01-25 PROCEDURE — 84100 ASSAY OF PHOSPHORUS: CPT | Performed by: HOSPITALIST

## 2024-01-25 PROCEDURE — 250N000013 HC RX MED GY IP 250 OP 250 PS 637: Performed by: EMERGENCY MEDICINE

## 2024-01-25 PROCEDURE — 36415 COLL VENOUS BLD VENIPUNCTURE: CPT | Performed by: HOSPITALIST

## 2024-01-25 PROCEDURE — 250N000013 HC RX MED GY IP 250 OP 250 PS 637: Performed by: HOSPITALIST

## 2024-01-25 PROCEDURE — 258N000002 HC RX IP 258 OP 250: Performed by: HOSPITALIST

## 2024-01-25 PROCEDURE — 250N000013 HC RX MED GY IP 250 OP 250 PS 637: Performed by: INTERNAL MEDICINE

## 2024-01-25 PROCEDURE — 120N000001 HC R&B MED SURG/OB

## 2024-01-25 PROCEDURE — 83735 ASSAY OF MAGNESIUM: CPT | Performed by: HOSPITALIST

## 2024-01-25 PROCEDURE — 86140 C-REACTIVE PROTEIN: CPT | Performed by: HOSPITALIST

## 2024-01-25 PROCEDURE — 80048 BASIC METABOLIC PNL TOTAL CA: CPT | Performed by: HOSPITALIST

## 2024-01-25 RX ORDER — AMOXICILLIN AND CLAVULANATE POTASSIUM 400; 57 MG/5ML; MG/5ML
500 POWDER, FOR SUSPENSION ORAL 2 TIMES DAILY
Qty: 50 ML | Refills: 0 | Status: SHIPPED | OUTPATIENT
Start: 2024-01-25 | End: 2024-01-29

## 2024-01-25 RX ORDER — SODIUM CHLORIDE 450 MG/100ML
INJECTION, SOLUTION INTRAVENOUS CONTINUOUS
Status: ACTIVE | OUTPATIENT
Start: 2024-01-25 | End: 2024-01-25

## 2024-01-25 RX ADMIN — MICONAZOLE NITRATE: 2 POWDER TOPICAL at 09:02

## 2024-01-25 RX ADMIN — Medication 20 MG: at 06:42

## 2024-01-25 RX ADMIN — SODIUM CHLORIDE: 4.5 INJECTION, SOLUTION INTRAVENOUS at 13:23

## 2024-01-25 RX ADMIN — BRIVARACETAM 100 MG: 10 SOLUTION ORAL at 09:01

## 2024-01-25 RX ADMIN — MULTIVITAMIN TABLET 1 TABLET: TABLET at 13:22

## 2024-01-25 RX ADMIN — LEVOTHYROXINE SODIUM 112 MCG: 112 TABLET ORAL at 09:00

## 2024-01-25 RX ADMIN — MICONAZOLE NITRATE: 2 POWDER TOPICAL at 21:53

## 2024-01-25 RX ADMIN — CARBAMAZEPINE 100 MG: 100 SUSPENSION ORAL at 17:37

## 2024-01-25 RX ADMIN — BRIVARACETAM 100 MG: 10 SOLUTION ORAL at 21:41

## 2024-01-25 RX ADMIN — AMOXICILLIN AND CLAVULANATE POTASSIUM 500 MG: 400; 57 POWDER, FOR SUSPENSION ORAL at 21:41

## 2024-01-25 RX ADMIN — HYDROCORTISONE 15 MG: 10 TABLET ORAL at 08:59

## 2024-01-25 RX ADMIN — HYDROCORTISONE 7.5 MG: 5 TABLET ORAL at 13:22

## 2024-01-25 RX ADMIN — AMOXICILLIN AND CLAVULANATE POTASSIUM 500 MG: 400; 57 POWDER, FOR SUSPENSION ORAL at 09:01

## 2024-01-25 RX ADMIN — CARBAMAZEPINE 150 MG: 100 SUSPENSION ORAL at 00:32

## 2024-01-25 RX ADMIN — METOCLOPRAMIDE HYDROCHLORIDE 10 MG: 5 SOLUTION ORAL at 06:42

## 2024-01-25 RX ADMIN — CARBAMAZEPINE 150 MG: 100 SUSPENSION ORAL at 06:42

## 2024-01-25 RX ADMIN — CARBAMAZEPINE 150 MG: 100 SUSPENSION ORAL at 11:38

## 2024-01-25 RX ADMIN — METOCLOPRAMIDE HYDROCHLORIDE 10 MG: 5 SOLUTION ORAL at 21:41

## 2024-01-25 RX ADMIN — METOCLOPRAMIDE HYDROCHLORIDE 10 MG: 5 SOLUTION ORAL at 11:38

## 2024-01-25 RX ADMIN — ENOXAPARIN SODIUM 40 MG: 40 INJECTION SUBCUTANEOUS at 09:00

## 2024-01-25 RX ADMIN — Medication 60 ML: at 08:59

## 2024-01-25 RX ADMIN — METOCLOPRAMIDE HYDROCHLORIDE 10 MG: 5 SOLUTION ORAL at 16:35

## 2024-01-25 ASSESSMENT — ACTIVITIES OF DAILY LIVING (ADL)
ADLS_ACUITY_SCORE: 61
ADLS_ACUITY_SCORE: 57
ADLS_ACUITY_SCORE: 61
ADLS_ACUITY_SCORE: 57
ADLS_ACUITY_SCORE: 61
ADLS_ACUITY_SCORE: 57
ADLS_ACUITY_SCORE: 61
ADLS_ACUITY_SCORE: 61

## 2024-01-25 NOTE — PLAN OF CARE
Goal Outcome Evaluation:         DATE & TIME: 1/24/24-1/25/24 4535-3502  Cognitive Concerns/ Orientation : alert - HECTOR orientation, baseline nonverbal.   BEHAVIOR & AGGRESSION TOOL COLOR: green   ABNL VS/O2: VSS on RA   MOBILITY: Total - T/R q2h, up with lift   PAIN MANAGMENT: no nonverbal indicators of pain.   DIET: NPO - TF running at goal rate 55 ml/hr - FWF q4h 160ml  BOWEL/BLADDER: incontinent of BM/bladder - brief and pad in place. BM x1 this shift   ABNL LAB/BG: phosphorus 2.1, recheck in AM, Mg 2.5   HGB 12.4, Hgb 12.4, Na 152,   DRAIN/DEVICES: PIV SL; Gastrostomy open to gravity drainage, Jejunostomy TF [id number]  SKIN: abrasion and excoriation to R-shin, sacrum, buttocks, scattered scabs and rash with minimal bruising; red blanchable area on sacrum, redness in perineal area  TESTS/PROCEDURES: none  D/C DATE: possible 1/25  MORE INFO: amoxicillin started 1/24 2x Daily.
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
## 2024-01-25 NOTE — DISCHARGE INSTRUCTIONS
Nutrition Discharge Instructions:   Jevity 1.5 @ 55 mL/hr x 22 hours (~5.5 cartons/day)  Flush 160 mL free water every 4 hours, or provide total 960-1L free water daily, spread throughout the day.

## 2024-01-25 NOTE — PLAN OF CARE
Goal Outcome Evaluation:       DATE & TIME: 1/25/24 3661-2458  Cognitive Concerns/ Orientation : alert but drowsy, a bit difficult to arouse- HECTOR orientation, baseline nonverbal.   BEHAVIOR & AGGRESSION TOOL COLOR: green   ABNL VS/O2: VSS on RA   MOBILITY: Total - T/R q2h, up with lift   PAIN MANAGMENT: no nonverbal indicators of pain.   DIET: NPO - TF running at goal rate 55 ml/hr - FWF q4h 160ml  BOWEL/BLADDER: incontinent of BM/bladder - brief and pad in place. BM x1 this shift   ABNL LAB/BG: Mag 2.4, Calc 8.6,   DRAIN/DEVICES: PIV running 75ml/hr- needs to be stopped at 1900; Gastrostomy open to gravity drainage, Jejunostomy TF [id number]  SKIN: abrasion and excoriation to R-shin, sacrum, buttocks, scattered scabs and rash with minimal bruising; red blanchable area on sacrum, redness in perineal area  TESTS/PROCEDURES: none  D/C DATE: held due to alertness level, possible 1/26  MORE INFO: amoxicillin started 1/24 2x Daily.

## 2024-01-25 NOTE — PROVIDER NOTIFICATION
Patient is on Mode carb diet order. Sent text message via [employer] asking if patient should be on Mode carb or NPO and to clarify. Dr Costa texted back and said it was an error from the day RN and patient is strict NPO.

## 2024-01-25 NOTE — PROGRESS NOTES
Care Management Follow Up    Length of Stay (days): 4    Expected Discharge Date: 01/25/2024     Concerns to be Addressed: discharge planning     Patient plan of care discussed at interdisciplinary rounds: Yes    Anticipated Discharge Disposition: Home, Home Care     Anticipated Discharge Services: None  Anticipated Discharge DME: None    Patient/family educated on Medicare website which has current facility and service quality ratings:  Yes   Education Provided on the Discharge Plan: Yes  Patient/Family in Agreement with the Plan: yes    Referrals Placed by CM/SW: Homecare  Private pay costs discussed: Not applicable    Additional Information:  Spoke with patients mother Savannah regarding discharge plans.  Initial plan was for discharge today but held due to decreased LOC.   Home Care referral sent . [facility] homecare accepted patient. Will  need to call [phone] when patient is discharged.     Anita Casillas RN [phone]

## 2024-01-25 NOTE — PLAN OF CARE
Goal Outcome Evaluation:      Plan of Care Reviewed With: patient, parent    DATE & TIME: 1/24/24 [id number]   Cognitive Concerns/ Orientation : alert - HECTOR orientation, baseline nonverbal.   BEHAVIOR & AGGRESSION TOOL COLOR: green   ABNL VS/O2: VSS on RA   MOBILITY: Total - T/R q2h, up with lift   PAIN MANAGMENT: no nonverbal indicators of pain.   DIET: NPO - TF running at goal rate 55 ml/hr - FWF q4h 160ml  BOWEL/BLADDER: incontinent of BM/bladder - brief and pad in place. Last BM 1/24  ABNL LAB/BG: phosphorus 2.1, replaced, recheck tomorrow, Mg 2.5   HGB 12.4, Hgb 12.4, Na 152,   DRAIN/DEVICES: PIV SL; Gastrostomy open to gravity drainage, Jejunostomy TF [id number]  SKIN: abrasion and excoriation to R-shin, sacrum, buttocks, scattered scabs and rush with minimal bruising; red blanchable area on sacrum, redness in perineal area  TESTS/PROCEDURES: none  D/C DATE: possible 1/25  MORE INFO: amoxicillin started 1/24 2x Daily.

## 2024-01-26 ENCOUNTER — TELEPHONE (OUTPATIENT)
Dept: FAMILY MEDICINE | Facility: CLINIC | Age: 64
End: 2024-01-26
Payer: MEDICARE

## 2024-01-26 VITALS
BODY MASS INDEX: 21.49 KG/M2 | HEIGHT: 70 IN | TEMPERATURE: 97.3 F | RESPIRATION RATE: 18 BRPM | HEART RATE: 96 BPM | OXYGEN SATURATION: 97 % | WEIGHT: 150.13 LBS | DIASTOLIC BLOOD PRESSURE: 49 MMHG | SYSTOLIC BLOOD PRESSURE: 109 MMHG

## 2024-01-26 DIAGNOSIS — E83.39 HYPOPHOSPHATEMIA: ICD-10-CM

## 2024-01-26 LAB
ANION GAP SERPL CALCULATED.3IONS-SCNC: 6 MMOL/L (ref 7–15)
BACTERIA BLD CULT: NO GROWTH
BASOPHILS # BLD AUTO: 0.1 10E3/UL (ref 0–0.2)
BASOPHILS NFR BLD AUTO: 1 %
BUN SERPL-MCNC: 16.1 MG/DL (ref 8–23)
CALCIUM SERPL-MCNC: 7.9 MG/DL (ref 8.8–10.2)
CHLORIDE SERPL-SCNC: 102 MMOL/L (ref 98–107)
CREAT SERPL-MCNC: 0.65 MG/DL (ref 0.67–1.17)
DEPRECATED HCO3 PLAS-SCNC: 28 MMOL/L (ref 22–29)
EGFRCR SERPLBLD CKD-EPI 2021: >90 ML/MIN/1.73M2
ENTEROCOCCUS FAECALIS: NOT DETECTED
ENTEROCOCCUS FAECIUM: NOT DETECTED
EOSINOPHIL # BLD AUTO: 0.7 10E3/UL (ref 0–0.7)
EOSINOPHIL NFR BLD AUTO: 10 %
ERYTHROCYTE [DISTWIDTH] IN BLOOD BY AUTOMATED COUNT: 13 % (ref 10–15)
GLUCOSE SERPL-MCNC: 131 MG/DL (ref 70–99)
HCT VFR BLD AUTO: 33.7 % (ref 40–53)
HGB BLD-MCNC: 10.9 G/DL (ref 13.3–17.7)
IMM GRANULOCYTES # BLD: 0 10E3/UL
IMM GRANULOCYTES NFR BLD: 0 %
LISTERIA SPECIES (DETECTED/NOT DETECTED): NOT DETECTED
LYMPHOCYTES # BLD AUTO: 2.5 10E3/UL (ref 0.8–5.3)
LYMPHOCYTES NFR BLD AUTO: 37 %
MAGNESIUM SERPL-MCNC: 2 MG/DL (ref 1.7–2.3)
MCH RBC QN AUTO: 28.7 PG (ref 26.5–33)
MCHC RBC AUTO-ENTMCNC: 32.3 G/DL (ref 31.5–36.5)
MCV RBC AUTO: 89 FL (ref 78–100)
MONOCYTES # BLD AUTO: 0.6 10E3/UL (ref 0–1.3)
MONOCYTES NFR BLD AUTO: 9 %
NEUTROPHILS # BLD AUTO: 2.8 10E3/UL (ref 1.6–8.3)
NEUTROPHILS NFR BLD AUTO: 43 %
NRBC # BLD AUTO: 0 10E3/UL
NRBC BLD AUTO-RTO: 0 /100
PHOSPHATE SERPL-MCNC: 2.4 MG/DL (ref 2.5–4.5)
PLATELET # BLD AUTO: 148 10E3/UL (ref 150–450)
POTASSIUM SERPL-SCNC: 3.4 MMOL/L (ref 3.4–5.3)
RBC # BLD AUTO: 3.8 10E6/UL (ref 4.4–5.9)
SODIUM SERPL-SCNC: 136 MMOL/L (ref 135–145)
STAPHYLOCOCCUS AUREUS: NOT DETECTED
STAPHYLOCOCCUS EPIDERMIDIS: NOT DETECTED
STAPHYLOCOCCUS LUGDUNENSIS: NOT DETECTED
STAPHYLOCOCCUS SPECIES: NOT DETECTED
STREPTOCOCCUS AGALACTIAE: NOT DETECTED
STREPTOCOCCUS ANGINOSUS GROUP: NOT DETECTED
STREPTOCOCCUS PNEUMONIAE: NOT DETECTED
STREPTOCOCCUS PYOGENES: NOT DETECTED
STREPTOCOCCUS SPECIES: NOT DETECTED
WBC # BLD AUTO: 6.6 10E3/UL (ref 4–11)

## 2024-01-26 PROCEDURE — 83735 ASSAY OF MAGNESIUM: CPT | Performed by: HOSPITALIST

## 2024-01-26 PROCEDURE — 84100 ASSAY OF PHOSPHORUS: CPT | Performed by: HOSPITALIST

## 2024-01-26 PROCEDURE — 250N000013 HC RX MED GY IP 250 OP 250 PS 637: Performed by: INTERNAL MEDICINE

## 2024-01-26 PROCEDURE — 80048 BASIC METABOLIC PNL TOTAL CA: CPT | Performed by: HOSPITALIST

## 2024-01-26 PROCEDURE — 99239 HOSP IP/OBS DSCHRG MGMT >30: CPT | Performed by: HOSPITALIST

## 2024-01-26 PROCEDURE — 85025 COMPLETE CBC W/AUTO DIFF WBC: CPT | Performed by: HOSPITALIST

## 2024-01-26 PROCEDURE — 250N000013 HC RX MED GY IP 250 OP 250 PS 637: Performed by: HOSPITALIST

## 2024-01-26 PROCEDURE — 36415 COLL VENOUS BLD VENIPUNCTURE: CPT | Performed by: HOSPITALIST

## 2024-01-26 PROCEDURE — 250N000011 HC RX IP 250 OP 636: Performed by: INTERNAL MEDICINE

## 2024-01-26 RX ORDER — POTASSIUM CHLORIDE 1.5 G/1.58G
40 POWDER, FOR SOLUTION ORAL ONCE
Status: COMPLETED | OUTPATIENT
Start: 2024-01-26 | End: 2024-01-26

## 2024-01-26 RX ADMIN — LEVOTHYROXINE SODIUM 112 MCG: 112 TABLET ORAL at 08:06

## 2024-01-26 RX ADMIN — METOCLOPRAMIDE HYDROCHLORIDE 10 MG: 5 SOLUTION ORAL at 06:46

## 2024-01-26 RX ADMIN — MICONAZOLE NITRATE: 2 POWDER TOPICAL at 08:06

## 2024-01-26 RX ADMIN — CARBAMAZEPINE 150 MG: 100 SUSPENSION ORAL at 06:40

## 2024-01-26 RX ADMIN — METOCLOPRAMIDE HYDROCHLORIDE 10 MG: 5 SOLUTION ORAL at 11:17

## 2024-01-26 RX ADMIN — ENOXAPARIN SODIUM 40 MG: 40 INJECTION SUBCUTANEOUS at 08:04

## 2024-01-26 RX ADMIN — MULTIVITAMIN TABLET 1 TABLET: TABLET at 12:54

## 2024-01-26 RX ADMIN — HYDROCORTISONE 7.5 MG: 5 TABLET ORAL at 13:36

## 2024-01-26 RX ADMIN — CARBAMAZEPINE 150 MG: 100 SUSPENSION ORAL at 01:09

## 2024-01-26 RX ADMIN — AMOXICILLIN AND CLAVULANATE POTASSIUM 500 MG: 400; 57 POWDER, FOR SUSPENSION ORAL at 08:05

## 2024-01-26 RX ADMIN — POTASSIUM & SODIUM PHOSPHATES POWDER PACK 280-160-250 MG 1 PACKET: 280-160-250 PACK at 13:36

## 2024-01-26 RX ADMIN — POTASSIUM & SODIUM PHOSPHATES POWDER PACK 280-160-250 MG 1 PACKET: 280-160-250 PACK at 10:15

## 2024-01-26 RX ADMIN — POTASSIUM CHLORIDE 40 MEQ: 1.5 POWDER, FOR SOLUTION ORAL at 10:15

## 2024-01-26 RX ADMIN — CARBAMAZEPINE 150 MG: 100 SUSPENSION ORAL at 12:54

## 2024-01-26 RX ADMIN — Medication 60 ML: at 09:13

## 2024-01-26 RX ADMIN — Medication 20 MG: at 06:40

## 2024-01-26 RX ADMIN — BRIVARACETAM 100 MG: 10 SOLUTION ORAL at 09:13

## 2024-01-26 RX ADMIN — HYDROCORTISONE 15 MG: 10 TABLET ORAL at 08:05

## 2024-01-26 ASSESSMENT — ACTIVITIES OF DAILY LIVING (ADL)
ADLS_ACUITY_SCORE: 61
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
## 2024-01-26 NOTE — PLAN OF CARE
1- AM shift    Cognitive Concerns/ Orientation : Alert. HECTOR orientation, moans/able to make some sounds/shows thumb up when happy or agrees.   BEHAVIOR & AGGRESSION TOOL COLOR: green                                                              ABNL VS/O2: VSS on RA; infrequent cough-congested but able to clear secretions.   MOBILITY: Ax2 lift.Total care.T/R q2h  PAIN MANAGMENT: Denies. Appears comfortable at rest; moans with memo cares and tries to push away.   DIET: NPO - TF running at goal rate 55 ml/hr - FWF q4h 160ml.  BOWEL/BLADDER: incontinent of BM/bladder - brief and pad in place. BM x 2 this shift; loose  ABNL LAB/BG:K and ph replaced this shift. +BC notified MD, no changes.   DRAIN/DEVICES: PIV SL; Gastrostomy open to gravity drainage, Jejunostomy TF 0532-2590  SKIN: Pale.Scattered abrasion and excoriation on BLE and left thigh, dressing on R heel CDI. Red blanchable area on sacrum, mepilex in place. Redness in perineal area, miconazole powder applied. Bed bath completed  TESTS/PROCEDURES: none  D/C DATE: discharge home with home care today  MORE INFO:n/a  Discharge    Patient discharged to home with [facility] transportation via stretcher   Care plan note  See above    Listed belongings gathered and given to patient (including from security/pharmacy). Yes  Care Plan and Patient education resolved: Yes  Prescriptions if needed, hard copies sent with patient  NA  Medication Bin checked and emptied on discharge Yes  SW/care coordinator/charge RN aware of discharge: Yes

## 2024-01-26 NOTE — TELEPHONE ENCOUNTER
Mom calling,   Neutraphos was not sent home from hospital with other meds. Advised was sent to [facility] and provided phone number.     Merary Hurley RN [facility]

## 2024-01-26 NOTE — PLAN OF CARE
Goal Outcome Evaluation:                      DATE & TIME: 1/25/24, pm shift  Cognitive Concerns/ Orientation : Alert and  HECTOR orientation, baseline nonverbal. Patient at times uses thump up for yes and down for No.  BEHAVIOR & AGGRESSION TOOL COLOR: green   ABNL VS/O2: VSS on RA   MOBILITY: Total - T/R q2h, up with lift   PAIN MANAGMENT: Denies. No nonverbal indicators of pain.   DIET: NPO - TF running at goal rate 55 ml/hr - FWF q4h 160ml  BOWEL/BLADDER: incontinent of BM/bladder - brief and pad in place. No bm.  ABNL LAB/BG: Mag 2.4, Calc 8.6, CRP 16.82  DRAIN/DEVICES: PIV SL; Gastrostomy open to gravity drainage, Jejunostomy TF 1341-6899  SKIN: Pale.Scattered abrasion and excoriation on BLE and left thigh, dressing on R heel CDI. Red blanchable area on sacrum, mepilex in place. Redness in perineal area, miconazole powder applied.  TESTS/PROCEDURES: none  D/C DATE: discharge home with home care possible 1/26  MORE INFO:  On Amoxicillin  2x Daily.

## 2024-01-26 NOTE — PLAN OF CARE
Goal Outcome Evaluation:       Shift: 01/25-01/26   8299-1487    Cognitive Concerns/ Orientation : Alert. HECTOR orientation, baseline nonverbal. Patient at times uses thump up for yes and down for No.  BEHAVIOR & AGGRESSION TOOL COLOR: green   ABNL VS/O2: VSS on RA   MOBILITY: Ax2 lift.Total care.T/R q2h  PAIN MANAGMENT: Denies. No nonverbal indicators of pain absent.  DIET: NPO - TF running at goal rate 55 ml/hr - FWF q4h 160ml  BOWEL/BLADDER: incontinent of BM/bladder - brief and pad in place. No bm.  ABNL LAB/BG:see chart   DRAIN/DEVICES: PIV SL; Gastrostomy open to gravity drainage, Jejunostomy TF 6460-4429  SKIN: Pale.Scattered abrasion and excoriation on BLE and left thigh, dressing on R heel CDI. Red blanchable area on sacrum, mepilex in place. Redness in perineal area, miconazole powder applied.  TESTS/PROCEDURES: none  D/C DATE: discharge home with home care possible 1/26  MORE INFO:  On Amoxicillin  2x Daily.

## 2024-01-26 NOTE — PROGRESS NOTES
CLINICAL NUTRITION SERVICES - REASSESSMENT NOTE    Recommendations Ordered by Registered Dietitian (RD):   - Jevity 1.5 x 22 hours (held 1 hour before to 1 hour after levothyroxine dose)   - Flush 160 mL q 4 hours for hydration (provides total 1880 mL free water when combined with free water in formula).    Malnutrition:   % Weight Loss:  Unable to fully assess. Expect none, though wt's tend to fluctuate  % Intake:  Decreased intake does not meet criteria for malnutrition  Subcutaneous Fat Loss: Low stores at baseline  Muscle Loss:  Low stores at baseline   Fluid Retention:  None noted     Malnutrition Diagnosis: Patient does not meet two of the above criteria necessary for diagnosing malnutrition     EVALUATION OF PROGRESS TOWARD GOALS   Diet: NPO    Nutrition Support Enteral:  Type of Feeding Tube: Jejunostomy (GT for venting)   Enteral Regimen: Jevity 1.5 @ 55 mL/hr x 22 hours (hold for Levothyroxine)   Total Enteral Provisions: 1915 kcal (28 kcal/kg), 87 g protein (1.3 g/kg), 25 g fiber, 920 mL free water   Free Water Flush: 160 mL q 4 hours (960 additional mls)  Total (Free water + TF) = 1880 mL/day     Intake/Tolerance:   - No TF interruptions noted.     - Stoolin/25 - BM x1   - BM x3  - Weight: Current wt is trending on the low side for patient. Wt seems to change with fluctuations in fluid status, and potentially adequacy of home TF regimen. Will continue to monitor. This admission pt seems to be on dry side.    Date/Time Weight   24 0600 68.1 kg (150 lb 2.1 oz)   24 0753 68.2 kg (150 lb 6.4 oz)     Wt Readings from Last 30 Encounters:   24 68.1 kg (150 lb 2.1 oz)   24 68.5 kg (151 lb)   23 68.5 kg (151 lb 0.2 oz)   23 75.3 kg (166 lb 0.1 oz)   23 69.8 kg (153 lb 14.1 oz)   23 74.8 kg (165 lb)   23 74.8 kg (165 lb)   10/26/23 70.8 kg (156 lb 1.4 oz)   23 63.5 kg (140 lb)   23 63.5 kg (140 lb)   22 64.4 kg (141 lb 14.4 oz)    06/27/22 69 kg (152 lb 1.9 oz)   06/07/22 71 kg (156 lb 8 oz)   05/11/22 68.1 kg (150 lb 3.2 oz)   03/18/22 72.6 kg (160 lb)   12/11/21 75.2 kg (165 lb 12.6 oz)   11/17/21 73.6 kg (162 lb 3.2 oz)   10/30/21 75.2 kg (165 lb 12.6 oz)   08/10/21 78.8 kg (173 lb 11.6 oz)   05/24/21 80.1 kg (176 lb 8 oz)   04/16/21 76 kg (167 lb 8.8 oz)       - Labs:    Electrolytes  Potassium (mmol/L)   Date Value   01/26/2024 3.4   01/25/2024 3.9   01/24/2024 3.6   01/15/2023 4.1   01/09/2023 4.0     Phosphorus (mg/dL)   Date Value   01/26/2024 2.4 (L)   01/25/2024 2.9   01/24/2024 2.1 (L)   01/23/2024 2.5   01/22/2024 3.2    Blood Glucose  Glucose (mg/dL)   Date Value   01/26/2024 131 (H)   01/25/2024 91   01/24/2024 76   01/23/2024 129 (H)   01/22/2024 121 (H)   01/15/2023 184 (H)   01/09/2023 108 (H)   11/28/2022 151 (H)        Magnesium (mg/dL)   Date Value   01/26/2024 2.0   01/25/2024 2.4 (H)   01/24/2024 2.5 (H)     Sodium (mmol/L)   Date Value   01/26/2024 136   01/25/2024 145   01/24/2024 146 (H)      Albumin (g/dL)   Date Value   01/21/2024 4.0   01/01/2024 2.8 (L)   12/31/2023 2.8 (L)             - Meds:   Levothyroxine @ 0900  Reglan 4x daily   Thera vit daily   Expedite (wound healing supplement)     ASSESSED NUTRITION NEEDS:  Dosing Weight 68 kg - 1/25  Estimated Energy Needs: 9664-3039 kcals (25-30 Kcal/Kg)  Justification: maintenance  Estimated Protein Needs:  grams protein (1.2-1.5 g pro/Kg)  Justification: preservation of lean body mass, wounds  Estimated Fluid Needs: 1 mL/kcal for maintenance, or per provider pending fluid status     NEW FINDINGS:   - Possibly home today. Discharge held yesterday     Previous Goals:   TF @ goal to provide % estimated needs.    Evaluation: Met    Previous Nutrition Diagnosis:   Inadequate protein-energy intake related to transfer of care as evidenced by TF has been off since hospitalization yesterday.    Evaluation: Completed    CURRENT NUTRITION DIAGNOSIS  No nutrition  diagnosis identified at this time     INTERVENTIONS  Recommendations / Nutrition Prescription  Continue TF  + Flushes as ordered.     Implementation  Collaboration and Referral of Nutrition care: discussed goal fluid flush with MD, RN, and CC yesterday. Discharge orders completed.     Goals  TF + Flushes at goal to provide % estimated needs with stable sodium trends.     MONITORING AND EVALUATION:  Progress towards goals will be monitored and evaluated per protocol and Practice Guidelines    Marisel Fernández RD, LD  Pager: [phone]  Weekend Pager: [phone]

## 2024-01-26 NOTE — TELEPHONE ENCOUNTER
CC: Patient's mother Savannah is calling on behalf of the patient regarding neutra-phos (prescribed upon discharge from hospital - discharged 1/26/24).         Per chart review, rx was sent today but only for 2 packets. Patient will run out after today. Savannah is unsure if patient is to continue this rx or if it will be completed after the 2 doses? Appears per hospital AVS that rx is to be completed after 2 doses. Savannah would like to very this with PCP.        If patient is to continue rx - please sent to the following pharmacy:     [id number] - [place], [place] - [address] AT [facility] [address]     Made plan with Savannah, if she does not hear back from PCP/pharmacy by tomorrow, Savannah will callback to weekend triage and have on call provider paged. Savannah agreeable to this plan.    Callback [phone] - ok to leave detailed VM     Stephane Velasquez RN  [facility]

## 2024-01-28 ENCOUNTER — PATIENT OUTREACH (OUTPATIENT)
Dept: CARE COORDINATION | Facility: CLINIC | Age: 64
End: 2024-01-28
Payer: MEDICARE

## 2024-01-28 LAB
BACTERIA BLD CULT: ABNORMAL
BACTERIA BLD CULT: ABNORMAL
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
## 2024-01-28 NOTE — TELEPHONE ENCOUNTER
Per discharge summary, only 2 doses of neutra-phos were given.  Can recheck phos at hospital follow up with Ledy

## 2024-01-29 ENCOUNTER — TELEPHONE (OUTPATIENT)
Dept: FAMILY MEDICINE | Facility: CLINIC | Age: 64
End: 2024-01-29

## 2024-01-29 ENCOUNTER — OFFICE VISIT (OUTPATIENT)
Dept: FAMILY MEDICINE | Facility: CLINIC | Age: 64
End: 2024-01-29
Payer: MEDICARE

## 2024-01-29 VITALS
HEART RATE: 70 BPM | OXYGEN SATURATION: 99 % | RESPIRATION RATE: 20 BRPM | SYSTOLIC BLOOD PRESSURE: 94 MMHG | DIASTOLIC BLOOD PRESSURE: 65 MMHG | TEMPERATURE: 97 F

## 2024-01-29 DIAGNOSIS — E83.39 LOW PHOSPHATE LEVELS: ICD-10-CM

## 2024-01-29 DIAGNOSIS — Z09 HOSPITAL DISCHARGE FOLLOW-UP: Primary | ICD-10-CM

## 2024-01-29 DIAGNOSIS — J69.0 ASPIRATION PNEUMONITIS (H): ICD-10-CM

## 2024-01-29 DIAGNOSIS — E46 MALNUTRITION, UNSPECIFIED TYPE (H): ICD-10-CM

## 2024-01-29 DIAGNOSIS — Z09 HOSPITAL DISCHARGE FOLLOW-UP: ICD-10-CM

## 2024-01-29 DIAGNOSIS — E03.9 HYPOTHYROIDISM, UNSPECIFIED TYPE: ICD-10-CM

## 2024-01-29 DIAGNOSIS — E60 LOW ZINC LEVEL: ICD-10-CM

## 2024-01-29 DIAGNOSIS — R63.39 OTHER FEEDING DIFFICULTIES: ICD-10-CM

## 2024-01-29 DIAGNOSIS — K85.91 NECROTIZING PANCREATITIS: ICD-10-CM

## 2024-01-29 PROCEDURE — 80048 BASIC METABOLIC PNL TOTAL CA: CPT | Performed by: NURSE PRACTITIONER

## 2024-01-29 PROCEDURE — 36415 COLL VENOUS BLD VENIPUNCTURE: CPT | Performed by: NURSE PRACTITIONER

## 2024-01-29 PROCEDURE — 84443 ASSAY THYROID STIM HORMONE: CPT | Performed by: NURSE PRACTITIONER

## 2024-01-29 PROCEDURE — 99495 TRANSJ CARE MGMT MOD F2F 14D: CPT | Performed by: NURSE PRACTITIONER

## 2024-01-29 PROCEDURE — 84100 ASSAY OF PHOSPHORUS: CPT | Performed by: NURSE PRACTITIONER

## 2024-01-29 RX ORDER — RESPIRATORY SYNCYTIAL VIRUS VACCINE 120MCG/0.5
0.5 KIT INTRAMUSCULAR ONCE
Qty: 1 EACH | Refills: 0 | Status: CANCELLED | OUTPATIENT
Start: 2024-01-29 | End: 2024-01-29

## 2024-01-29 RX ORDER — SODIUM BICARBONATE 325 MG/1
325 TABLET ORAL DAILY
Qty: 90 TABLET | Refills: 3 | Status: ON HOLD | OUTPATIENT
Start: 2024-01-29 | End: 2024-07-12

## 2024-01-29 ASSESSMENT — PAIN SCALES - GENERAL: PAINLEVEL: NO PAIN (0)

## 2024-01-29 NOTE — DISCHARGE SUMMARY
[facility]    Discharge Summary  Hospitalist    Date of Admission:  2/9/2020  Date of Discharge:  2/12/2020  Discharging Provider: Melisa Ash MD    Discharge Diagnoses   Aspiration pneumonia  Sepsis due to aspiration pneumonia    History of Present Illness   Please Review admission history and physical.    Hospital Course   Keyon Farias was admitted on 2/9/2020.  The following problems were addressed during his hospitalization:    Active Problems:    Aspiration pneumonia (H)    Cumulative Summary:  Keyon Farias is a 57 year old male with PMHx of TBI nonverbal at baseline with chronic  R sided spastic hemiplegia, dysphagia with G-tube for TFs/meds, seizure disorder, panhypopituitarism and frequent hospital admissions for recurrent pneumonia who was admitted on 2/9/2020 for management of recurrent aspiration pneumonia        Assessment & Plan        Recurrent Aspiration Pneumonia  Hospitalized on 21 occasions in the calendar year 2019, most of which were related to pneumonia. Had been doing well in recent weeks, which his mom attributes to brushing teeth and oral cavity with alcohol mouthrinse TID. Sounds as though his home RN, who was recently sick, hasn't been keeping up with this in recent days. 3 days prior to admission, his mother noticed patient developed a cough and congestion. Today he had a low grade fever at 100.4. Brought to ED for further evaluation. Was afebrile and hemodynamically stable. O2 sats in 90s on RA. WBC 17.6. Lactate nl. Procal 0.08. CXR showed a new R basilar infiltrate. Started on Zosyn for presumed aspiration pneumonia.  Patient was on Zosyn, it was continued until 2/12 when he was discharged it was transitioned to p.o. Augmentin, he continued to have frequent oral cares, Mucomyst and albuterol nebs to continue, oxygen was weaned off prior to discharge.        TBI  Seizure disorder secondary to TBI  With spastic hemiplegia, dysphagia with recurrent aspiration. Has  Pharmacy does not have prescription on file    feeding tube.  -- continue tube feeds , RD is consulted   -- cont seizure meds per home regimen  -- cont other routine skin cares per routine given hx of pressure injuries and skin breakdown     Panhypopituitarism  Chronic and stable. Maintained on hydrocortisone, levothyroxine and testosterone.   -- cont hydrocortisone and levothyroxine      Melisa Ash MD    Significant Results and Procedures       Pending Results   These results will be followed up by PCP  Unresulted Labs Ordered in the Past 30 Days of this Admission     Date and Time Order Name Status Description    2/9/2020 2024 Blood culture Preliminary     2/9/2020 2024 Blood culture Preliminary           Code Status   Full Code       Primary Care Physician   Carlos Gomez    Physical Exam   Temp: 98  F (36.7  C) Temp src: Axillary BP: 97/64   Heart Rate: 92 Resp: 18 SpO2: 94 % O2 Device: None (Room air)    Vitals:    02/10/20 0049 02/11/20 0430 02/12/20 0631   Weight: 70.2 kg (154 lb 12.2 oz) 70.6 kg (155 lb 10.3 oz) 72.9 kg (160 lb 11.5 oz)     Vital Signs with Ranges  Temp:  [97.6  F (36.4  C)-98.1  F (36.7  C)] 98  F (36.7  C)  Heart Rate:  [86-95] 92  Resp:  [18] 18  BP: ()/(56-64) 97/64  SpO2:  [94 %-96 %] 94 %  I/O last 3 completed shifts:  In: 1267 [NG/GT:120]  Out: -     The patient was examined on the day of discharge.    Discharge Disposition   Discharged to home with receiving home care  Condition at discharge: Stable    Consultations This Hospital Stay   NUTRITION SERVICES ADULT IP CONSULT  PHARMACY IP CONSULT  CARE TRANSITION RN/SW IP CONSULT    Time Spent on this Encounter   I, Melisa Ash MD, personally saw the patient today and spent greater than 30 minutes discharging this patient.    Discharge Orders      Home care nursing referral      Follow-up and recommended labs and tests     Follow up with primary care provider, Dr Carlos Gomez, as scheduled for you on Thursday 2/20/20 at 1:30PM, for hospital follow- up.     Reason for  your hospital stay    Aspiration pneumonia     Follow-up and recommended labs and tests     Follow up with primary care provider, Carlos Gomez, within 7 days to evaluate medication change and for hospital follow- up.  The following labs/tests are recommended: cbc in 1 week .     Activity    Your activity upon discharge: activity as tolerated     Discharge Instructions    Follow up with primary care physician in 1 week .     Diet    Follow this diet upon discharge: Orders Placed This Encounter      Adult Formula Drip Feeding: Continuous Isosource 1.5; Jejunostomy; Goal Rate: 100; mL/hr; From: 7:00 AM; 7:00 PM; Medication - Feeding Tube Flush Frequency: At least 15-30 mL water before and after medication administration and with tube clogging;...      NPO for Medical/Clinical Reasons Except for: Meds     Discharge Medications   Current Discharge Medication List      START taking these medications    Details   amoxicillin-clavulanate (AUGMENTIN) 875-125 MG tablet Take 1 tablet by mouth 2 times daily for 7 days  Qty: 14 tablet, Refills: 0    Associated Diagnoses: Aspiration pneumonia of both lower lobes due to gastric secretions (H)         CONTINUE these medications which have NOT CHANGED    Details   acetylcysteine (MUCOMYST) 20 % neb solution Take 2 mLs by nebulization 4 times daily With albuterol at 0700, 1100, 1500, and 1900       albuterol (PROVENTIL) (5 MG/ML) 0.5% neb solution Take 2.5 mg by nebulization every 4 hours (while awake) 0700 1100 1500 1900 with mucomyst       aspirin (ASA) 81 MG chewable tablet 81 mg by Oral or Feeding Tube route daily At 0900      bacitracin ointment Apply topically daily as needed for wound care To PEG site.       Bioflavonoid Products (VITAMIN C PLUS) 1000 MG TABS 1 tablet by Oral or FT or NG tube route      Brivaracetam (BRIVIACT) 10 MG/ML solution 100 mg by Oral or Feeding Tube route 2 times daily 0900, 2100      calcium carbonate 1250 (500 CA) MG/5ML SUSP suspension 5 mLs  (1,250 mg) by Per J Tube route 3 times daily (with meals)  Qty: 450 mL    Associated Diagnoses: Malnutrition (H)      carBAMazepine (TEGRETOL) 100 MG/5ML suspension 150 mg by Oral or Feeding Tube route every 6 hours At 06:00, 12:00, 18:00 and 24:00 for seizures       ferrous sulfate 220 (44 Fe) MG/5ML ELIX 220 mg by Per Feeding Tube route daily      Guar Gum (FIBER MODULAR, NUTRISOURCE FIBER,) packet 1 packet by Per G Tube route daily  Qty: 30 packet, Refills: 0    Associated Diagnoses: Pneumonia of both lower lobes due to infectious organism (H)      !! hydrocortisone (CORTEF) 5 MG tablet 10 mg by Oral or FT or NG tube route daily (with dinner) At 1500      !! hydrocortisone (CORTEF) 5 MG tablet 20 mg by Oral or FT or NG tube route every morning       hydrocortisone 1 % CREA cream Place rectally 2 times daily as needed for other Apply to reddened memo areas as needed      levothyroxine (SYNTHROID/LEVOTHROID) 150 MCG tablet Take 150 mcg by mouth every morning      melatonin (MELATONIN) 1 MG/ML LIQD liquid 6 mg by Per NG tube route At Bedtime       metoclopramide (REGLAN) 5 MG/5ML solution 5 mg by Per Feeding Tube route 2 times daily      miconazole (MICATIN) 2 % AERP powder Apply topically 2 times daily as needed       mupirocin (BACTROBAN) 2 % external ointment Apply topically 2 times daily as needed       pantoprazole (PROTONIX) 2 mg/mL SUSP suspension 20 mLs (40 mg) by Per J Tube route daily  Qty: 400 mL, Refills: 1    Associated Diagnoses: Gastroesophageal reflux disease, esophagitis presence not specified      potassium & sodium phosphates (NEUTRA-PHOS) 280-160-250 MG Packet Take 1 packet by mouth 3 times daily       Scopolamine HBr POWD Dispense #90. Mix contents with small amount of water for admin via J-tube.  Administer 0.8 mg three times each day.  Qty: 90 Bottle, Refills: 11    Associated Diagnoses: Aspiration pneumonia (H)      Skin Protectants, Misc. (BALMEX SKIN PROTECTANT) OINT Externally apply  topically 2 times daily as needed (irritation) Applay to reddened memo areas twice daily as needed      sodium bicarbonate 650 MG tablet Take 1 tablet (650 mg) by mouth daily  Qty: 30 tablet, Refills: 0    Associated Diagnoses: Hyponatremia      testosterone cypionate (DEPOTESTOTERONE CYPIONATE) 200 MG/ML injection Inject 76 mg into the muscle See Admin Instructions Every 2 weeks on Thursdays  76 mg or 0.38 mL      vitamin D3 (CHOLECALCIFEROL) 2000 units (50 mcg) tablet Take 2,000 Units by mouth daily Crush and feed via j-tube @@ 0900      order for DME Equipment being ordered: Nebulizer  Qty: 1 Units, Refills: 0    Associated Diagnoses: Pneumonia of both lower lobes due to infectious organism (H)       !! - Potential duplicate medications found. Please discuss with provider.        Allergies   Allergies   Allergen Reactions     Dilantin [Phenytoin Sodium]      Valproic Acid      Toxicity c bone marrow suspension, elevated ammonia levels      Data   Most Recent 3 CBC's:  Recent Labs   Lab Test 02/11/20  0800 02/10/20  0742 02/09/20  2100   WBC 9.5 13.1* 17.6*   HGB 12.4* 13.2* 13.4   MCV 84 84 83    183 193      Most Recent 3 BMP's:  Recent Labs   Lab Test 02/12/20  0812 02/10/20  0742 02/09/20  2100    136 132*   POTASSIUM 4.0 4.0 4.4   CHLORIDE 105 104 101   CO2 29 27 26   BUN 15 16 14   CR 1.08 0.94 0.70   ANIONGAP 2* 5 5   STEVE 8.6 8.7 8.6   GLC 84 94 117*     Most Recent 2 LFT's:  Recent Labs   Lab Test 02/09/20  2100 01/02/20  0641   AST 17 26   ALT 27 25   ALKPHOS 117 71   BILITOTAL 0.4 0.3     Most Recent INR's and Anticoagulation Dosing History:  Anticoagulation Dose History     Recent Dosing and Labs Latest Ref Rng & Units 1/22/2017 1/22/2017 1/23/2017 1/25/2017 1/30/2017 2/7/2017 1/1/2020    INR 0.86 - 1.14 2.48(H) 2.58(H) 1.53(H) 1.49(H) 1.32(H) 1.27(H) 1.28(H)        Most Recent 3 Troponin's:  Recent Labs   Lab Test 01/22/17  0500 01/21/17  2348 01/21/17  1600   TROPI 0.017 0.025 0.028      Most Recent Cholesterol Panel:  Recent Labs   Lab Test 11/29/16  0430   TRIG 100     Most Recent 6 Bacteria Isolates From Any Culture (See EPIC Reports for Culture Details):  Recent Labs   Lab Test 02/09/20 2058 12/27/19 1957 12/27/19  1931 12/27/19  1930 12/23/19  1247 12/23/19  1123   CULT No growth after 3 days  No growth after 3 days No growth No growth No growth No growth No growth     Most Recent TSH, T4 and A1c Labs:  Recent Labs   Lab Test 08/12/19 2200 07/05/18  0021   TSH  --   --  <0.01*   T4  --   --  1.66*   A1C 6.1*   < >  --     < > = values in this interval not displayed.     Results for orders placed or performed during the hospital encounter of 02/09/20   XR Chest 2 Views    Narrative    CHEST TWO VIEW   2/9/2020 9:28 PM     HISTORY:  Cough, low grade fever.    COMPARISON: 1/31/2020 radiographs.    FINDINGS: The cardiomediastinal silhouette and pulmonary vasculature  remain within normal limits. The left lung remains clear. No effusions  or osseous changes. There is a moderate amount of new hazy opacity in  the right lung base. Elevated right diaphragm again noted. No other  change.      Impression    IMPRESSION: New right basilar infiltrate.    BARBARA CALVIN MD

## 2024-01-29 NOTE — PROGRESS NOTES
Assessment & Plan     (Z09) Hospital discharge follow-up  (primary encounter diagnosis)  Comment: doing really well since discharge from hospital for pneumonia. No concerns today   Plan:     (J69.0) Aspiration pneumonitis (H)  Comment: recheck labs   Plan: Phosphorus, Basic metabolic panel  (Ca, Cl,         CO2, Creat, Gluc, K, Na, BUN)            (E60) Low zinc level  Comment: recheck labs   Plan: Zinc, Copper level, Zinc, Copper level            (E83.39) Low phosphate levels  Comment: recheck labs   Plan: Phosphorus            (R63.39) Other feeding difficulties  Comment: he has been on neutra phos for a long time. This is reordered   Plan: Zinc, Copper level, potassium & sodium         phosphates (NEUTRA-PHOS) 280-160-250 MG Packet,        Zinc, Copper level            (E46) Malnutrition, unspecified type (H24)  Comment: has done well with neutra phos and sodium  bicarb long term. This is ordered   Plan: potassium & sodium phosphates (NEUTRA-PHOS)         280-160-250 MG Packet, sodium bicarbonate 325         MG tablet            (K85.91) Necrotizing pancreatitis  Comment: reordered   Plan: sodium bicarbonate 325 MG tablet            (E03.9) Hypothyroidism, unspecified type  Comment:   Plan:           MED REC REQUIRED  Post Medication Reconciliation Status: discharge medications reconciled and changed, per note/orders        Minerva Ta is a 63 year old, presenting for the following health issues:  Hospital F/U (Patient is here for a hospital follow up from 1/21/2024 to 1/26/2024./)    HPI         1/28/2024    12:53 PM   Post Discharge Outreach   Admission Date 1/21/2024   Reason for Admission Sepsis due to HCAP vs aspiration pneumonia   Excessive secretions  Chronic dysphagia s/p PEG tube placement  Pressure injuries to sacrum and Rt heel with concern for cellulitis right heel   Discharge Date 1/26/2024   Discharge Diagnosis Sepsis due to HCAP vs aspiration pneumonia   Excessive secretions  Chronic  dysphagia s/p PEG tube placement  Pressure injuries to sacrum and Rt heel with concern for cellulitis right heel   How are you doing now that you are home? talked with Pt's guardian, she stated that Pt is doing well   How are your symptoms? (Red Flag symptoms escalate to triage hotline per guidelines) Improved   Do you feel your condition is stable enough to be safe at home until your provider visit? Yes   Does the patient have their discharge instructions?  Yes   Does the patient have questions regarding their discharge instructions?  No   Were you started on any new medications or were there changes to any of your previous medications?  Yes   Does the patient have all of their medications? Yes   Do you have questions regarding any of your medications?  No   Do you have all of your needed medical supplies or equipment (DME)?  (i.e. oxygen tank, CPAP, cane, etc.) Yes   Discharge follow-up appointment scheduled within 14 calendar days?  Yes   Discharge Follow Up Appointment Date 1/30/2024   Discharge Follow Up Appointment Scheduled with? Primary Care Provider     Hospital Follow-up Visit:    Hospital/Nursing Home/ Rehab Facility: [facility]  Date of Admission: 1/21/2024  Date of Discharge: 1/26/2024  Reason(s) for Admission:   Reason for your hospital stay     Sepsis, aspiration pneumonia, and possible cellulitis and dehydration       Was your hospitalization related to COVID-19? No   Problems taking medications regularly:  None  Medication changes since discharge: None  Problems adhering to non-medication therapy:  None    Summary of hospitalization:  [facility] discharge summary reviewed  Diagnostic Tests/Treatments reviewed.  Follow up needed: BMP  Other Healthcare Providers Involved in Patient s Care:         Homecare  Update since discharge: stable.         Plan of care communicated with patient and family           Recent hospitalization for pneumonia and sepsis    Doing much better   Mom is his primary caregiver   No concerns today.   Mom requesting a couple refills today as noted.       Review of Systems  Detailed as above       Objective    BP 94/65 (BP Location: Left arm, Patient Position: Sitting, Cuff Size: Adult Large)   Pulse 70   Temp 97  F (36.1  C) (Temporal)   Resp 20   SpO2 99%   There is no height or weight on file to calculate BMI.  Physical Exam   Sitting in wheelchair   Normal respiratory effort   Nonverbal. Receptive communication normal. Able to shake head to answer questions           Signed Electronically by: NATHANIEL Ruff CNP

## 2024-01-29 NOTE — LETTER
January 31, 2024      Keyon Farais  [address] [place] [place] [address]        Dear ,    We are writing to inform you of your test results.    The following letter pertains to your most recent diagnostic tests:     My name is Dr. Daily and I am covering for Dr. Queen who is out of the clinic today.     You have an undetectable level of thyroid stimulating hormone (TSH) which would make sense since your medical chart states that you have panhypopituitarism.  As such this is not a surprising result.  I would encourage you to follow up with your endocrinologist and primary care physician as previously directed.      Sincerely,     Dr. Daily     Resulted Orders   TSH   Result Value Ref Range    TSH <0.01 (L) 0.30 - 4.20 uIU/mL

## 2024-01-29 NOTE — TELEPHONE ENCOUNTER
Patient Contact    Attempt # 1    Was call answered?  No.  Left message on voicemail with information to call back.    Brooke SCHWARZ, Triage RN  [facility]

## 2024-01-29 NOTE — TELEPHONE ENCOUNTER
Savannah returned called and was informed by Writer of Dr. Queen's message. Savannah was informed about F/U hospital visit TODAY (1/29/24) and that Keyon, if appropriate, will receive additional supply of phosphorus supplement. Savannah expressed understanding.

## 2024-01-29 NOTE — LETTER
February 8, 2024      Keyon GRAHAM Jarrett  [address] [place] [address]            Resulted Orders   TSH   Result Value Ref Range    TSH <0.01 (L) 0.30 - 4.20 uIU/mL   Phosphorus   Result Value Ref Range    Phosphorus 2.9 2.5 - 4.5 mg/dL   Basic metabolic panel  (Ca, Cl, CO2, Creat, Gluc, K, Na, BUN)   Result Value Ref Range    Sodium 129 (L) 135 - 145 mmol/L      Comment:      Reference intervals for this test were updated on 09/26/2023 to more accurately reflect our healthy population. There may be differences in the flagging of prior results with similar values performed with this method. Interpretation of those prior results can be made in the context of the updated reference intervals.     Potassium 4.6 3.4 - 5.3 mmol/L    Chloride 90 (L) 98 - 107 mmol/L    Carbon Dioxide (CO2) 27 22 - 29 mmol/L    Anion Gap 12 7 - 15 mmol/L    Urea Nitrogen 20.4 8.0 - 23.0 mg/dL    Creatinine 0.76 0.67 - 1.17 mg/dL    GFR Estimate >90 >60 mL/min/1.73m2    Calcium 9.0 8.8 - 10.2 mg/dL    Glucose 70 70 - 99 mg/dL       If you have any questions or concerns, please call the clinic at the number listed above.       Sincerely,      NATHANIEL Ruff CNP

## 2024-01-30 LAB
ANION GAP SERPL CALCULATED.3IONS-SCNC: 12 MMOL/L (ref 7–15)
BUN SERPL-MCNC: 20.4 MG/DL (ref 8–23)
CALCIUM SERPL-MCNC: 9 MG/DL (ref 8.8–10.2)
CHLORIDE SERPL-SCNC: 90 MMOL/L (ref 98–107)
CREAT SERPL-MCNC: 0.76 MG/DL (ref 0.67–1.17)
DEPRECATED HCO3 PLAS-SCNC: 27 MMOL/L (ref 22–29)
EGFRCR SERPLBLD CKD-EPI 2021: >90 ML/MIN/1.73M2
GLUCOSE SERPL-MCNC: 70 MG/DL (ref 70–99)
PHOSPHATE SERPL-MCNC: 2.9 MG/DL (ref 2.5–4.5)
POTASSIUM SERPL-SCNC: 4.6 MMOL/L (ref 3.4–5.3)
SODIUM SERPL-SCNC: 129 MMOL/L (ref 135–145)
TSH SERPL DL<=0.005 MIU/L-ACNC: <0.01 UIU/ML (ref 0.3–4.2)

## 2024-01-31 NOTE — RESULT ENCOUNTER NOTE
The following letter pertains to your most recent diagnostic tests:    My name is Dr. Daily and I am covering for Dr. Queen who is out of the clinic today.     You have an undetectable level of thyroid stimulating hormone (TSH) which would make sense since your medical chart states that you have panhypopituitarism.  As such this is not a surprising result.  I would encourage you to follow up with your endocrinologist and primary care physician as previously directed.      Sincerely,    Dr. Daily

## 2024-02-02 ENCOUNTER — TELEPHONE (OUTPATIENT)
Dept: FAMILY MEDICINE | Facility: CLINIC | Age: 64
End: 2024-02-02
Payer: MEDICARE

## 2024-02-02 DIAGNOSIS — G81.01 FLACCID HEMIPLEGIA AFFECTING RIGHT DOMINANT SIDE, UNSPECIFIED ETIOLOGY (H): Primary | ICD-10-CM

## 2024-02-02 NOTE — TELEPHONE ENCOUNTER
Home Care is calling regarding an established patient with  [facility].        9/21/2023     8:14 AM   Home Care Information   Date of Home Care episode start 9/15/2023   Current following provider Elsa Queen   Date provider agreed to follow 9/15/2023    Name/Phone Number NANCI Anderson #[phone]   Home Care agency [facility]     Requesting orders from: Elsa Queen  Provider is following patient: Yes  Is this a 60-day recertification request?  No    Orders Requested    Occupational Therapy  Request for initial certification (first set of orders)   Frequency:  1x/wk for 4 wks  DME-Assist with hospital bed. Pressure Mapping    Speech Therapy  Request for initial evaluation and treatment (one time)   Communication device-    RN provided verbal order for Speech Therapy    Confirmed ok to leave a detailed message with call back.  Contact information confirmed and updated as needed.    Adarsh Yanez RN

## 2024-02-06 NOTE — TELEPHONE ENCOUNTER
Verbal given for requested homecare orders.    Raissa Recio, RN  [facility] RN Triage Team     contact guard

## 2024-02-08 ENCOUNTER — TELEPHONE (OUTPATIENT)
Dept: PHARMACY | Facility: OTHER | Age: 64
End: 2024-02-08
Payer: MEDICARE

## 2024-02-08 ENCOUNTER — TELEPHONE (OUTPATIENT)
Dept: FAMILY MEDICINE | Facility: CLINIC | Age: 64
End: 2024-02-08
Payer: MEDICARE

## 2024-02-08 NOTE — TELEPHONE ENCOUNTER
----- Message from NATHANIEL Ruff CNP sent at 2/8/2024  5:23 PM CST -----  Please mail results.     Keyon your sodium is low, otherwise the labs are normal. Take the supplements that were prescribed. You can also consume more salt or add electrolyte to your water. Follow up if any symptoms develop  NATHANIEL Kee CNP

## 2024-02-08 NOTE — RESULT ENCOUNTER NOTE
Please mail results.     Keyon your sodium is low, otherwise the labs are normal. Take the supplements that were prescribed. You can also consume more salt or add electrolyte to your water. Follow up if any symptoms develop  NATHANIEL Kee CNP

## 2024-02-08 NOTE — TELEPHONE ENCOUNTER
Patient/mother (caregiver) were leaving the house as I called for scheduled 3 PM appointment. Asked to change to tomorrow at 11:30 AM. Visit scheduled.    Naveed Ortiz, JustinD, [facility]  Medication Therapy Management Pharmacist  Voicemail: [phone]

## 2024-02-08 NOTE — TELEPHONE ENCOUNTER
Spoke with pts mother Savannah (Guardian)message given letter was printed and mailed out 2/8/24    Raissa Kemp MA on 2/8/2024 at 5:55 PM

## 2024-02-09 ENCOUNTER — VIRTUAL VISIT (OUTPATIENT)
Dept: PHARMACY | Facility: CLINIC | Age: 64
End: 2024-02-09
Payer: MEDICAID

## 2024-02-09 DIAGNOSIS — E87.1 HYPONATREMIA: ICD-10-CM

## 2024-02-09 DIAGNOSIS — R65.20 SEVERE SEPSIS (H): Primary | ICD-10-CM

## 2024-02-09 DIAGNOSIS — E29.1 HYPOGONADISM MALE: ICD-10-CM

## 2024-02-09 DIAGNOSIS — R52 INTERMITTENT PAIN: ICD-10-CM

## 2024-02-09 DIAGNOSIS — A41.9 SEVERE SEPSIS (H): Primary | ICD-10-CM

## 2024-02-09 DIAGNOSIS — E23.0 PANHYPOPITUITARISM (H): ICD-10-CM

## 2024-02-09 DIAGNOSIS — K92.1 MELENA: ICD-10-CM

## 2024-02-09 DIAGNOSIS — E03.9 HYPOTHYROIDISM, UNSPECIFIED TYPE: ICD-10-CM

## 2024-02-09 DIAGNOSIS — K21.9 GASTROESOPHAGEAL REFLUX DISEASE, UNSPECIFIED WHETHER ESOPHAGITIS PRESENT: ICD-10-CM

## 2024-02-09 DIAGNOSIS — R13.10 DYSPHAGIA, UNSPECIFIED TYPE: ICD-10-CM

## 2024-02-09 DIAGNOSIS — G40.909 RECURRENT SEIZURES (H): ICD-10-CM

## 2024-02-09 DIAGNOSIS — Z78.9 TAKES DIETARY SUPPLEMENTS: ICD-10-CM

## 2024-02-09 DIAGNOSIS — J96.01 ACUTE RESPIRATORY FAILURE WITH HYPOXIA (H): ICD-10-CM

## 2024-02-09 DIAGNOSIS — Z87.820 HISTORY OF TRAUMATIC BRAIN INJURY: ICD-10-CM

## 2024-02-09 PROCEDURE — 99207 PR NO CHARGE LOS: CPT | Mod: 93 | Performed by: PHARMACIST

## 2024-02-09 NOTE — PATIENT INSTRUCTIONS
"Recommendations from today's MTM visit:                                                    MTM (medication therapy management) is a service provided by a clinical pharmacist designed to help you get the most of out of your medicines.   Today we reviewed what your medicines are for, how to know if they are working, that your medicines are safe and how to make your medicine regimen as easy as possible.      Continue your current medications.     Follow-up: As Needed    It was great speaking with you today.  I value your experience and would be very thankful for your time in providing feedback in our clinic survey. In the next few days, you may receive an email or text message from [employer] with a link to a survey related to your  clinical pharmacist.\"     To schedule another MTM appointment, please call the clinic directly or you may call the MTM scheduling line at [phone] or toll-free at [phone].     My Clinical Pharmacist's contact information:                                                      Please feel free to contact me with any questions or concerns you have.      Naveed Ortiz, [place], BCACP  Medication Therapy Management Pharmacist  Voicemail: [phone]  "

## 2024-02-09 NOTE — PROGRESS NOTES
"Medication Therapy Management (MTM) Encounter    ASSESSMENT:                            Medication Adherence/Access: No issues identified    Sepsis/Acute Respiratory Failure/Hyponatremia/Dysphagia: Improved.    History of traumatic brain injury/Recurrent seizures: Stable per patient/mother.     GERD/Melena: Stable.     Hypothyroidism: Stable. Last T4 is within normal limits.     Secondary adrenal insufficiency/Hypogonadism: Stable per patient/mother.     Pain:  Stable.      Supplements: Stable.     PLAN:                            Continue your current medications.     Follow-up: As Needed    SUBJECTIVE/OBJECTIVE:                          Keyon Farias is a 63 year old male called for a transitions of care visit. He was discharged from [facility] on 1/26/2024 for sepsis due to aspiration pneumonia. Joined on phone by mother, Savannah, as patient is non-verbal.     Reason for visit: Hospital Follow-up.    Allergies/ADRs: Reviewed in chart  Past Medical History: Reviewed in chart  Tobacco: He reports that he quit smoking about 34 years ago. His smoking use included cigarettes. He has never used smokeless tobacco.  Alcohol: none    Medication Adherence/Access: see below.  Medications are administered via G-tube by mother    Sepsis/Acute Respiratory Failure/Hyponatremia/Dysphagia: Savannah reports that he's doing well following discharge, no questions or major concerns. Current medications include sodium bicarbonate 325 mg daily and does take potassium/sodium phosphates packet daily (they pay out of pocket for these - price was better this time)  He does have homecare. Keyon also receives albuterol nebs a few times a day. When he's \"rattly\" - Savannah uses scopolamine compounded capsules three times daily as needed/glycopyrrolate and/or Mucinex.  No side effects reported.  Savannah reports Keyon's breathing has been stable. For G-tube care, Savannah uses bacitracin, mupirocin, or hydrocortisone as needed.  She " reports this is effective.  She requests a refill of mupirocin ointment.  Sodium   Date Value Ref Range Status   01/29/2024 129 (L) 135 - 145 mmol/L Final     Comment:     Reference intervals for this test were updated on 09/26/2023 to more accurately reflect our healthy population. There may be differences in the flagging of prior results with similar values performed with this method. Interpretation of those prior results can be made in the context of the updated reference intervals.    05/24/2021 143 133 - 144 mmol/L Final     Lab Results   Component Value Date    PHOS 2.9 01/29/2024     History of traumatic brain injury/Recurrent seizures: Patient is taking carbamazepine 150 mg three times daily (0600, 1200, 2400)/carbamazepine 100 mg once daily at 1800 and brivaracetam 100 mg twice daily at 0900/2100. Denies any recent episodes.     GERD/Melena: Current medications include: Protonix (pantoprazole) 40 mg once daily and metoclopramide three-four times daily (although prescribed for four times daily). Savannah reports no current symptoms and indicates current regimen is effective.    Hypothyroidism    Levothyroxine 112 mcg daily.   Patient is having the following symptoms: none.      TSH   Date Value Ref Range Status   01/29/2024 <0.01 (L) 0.30 - 4.20 uIU/mL Final   05/09/2022 <0.01 (L) 0.40 - 4.00 mU/L Final   07/05/2018 <0.01 (L) 0.40 - 4.00 mU/L Final     T4 Free   Date Value Ref Range Status   04/22/2021 1.40 0.76 - 1.46 ng/dL Final     Free T4   Date Value Ref Range Status   01/11/2024 1.25 0.90 - 1.70 ng/dL Final     Secondary adrenal insufficiency/Hypogonadism: Patient is receiving hydrocortisone 15 mg every morning/7.5 mg every afternoon and testosterone cypionate 60 mg every 7 days. Follows with endocrinology. Denies any known issues.      Pain:  Patient has acetaminophen available for use, but is not currently needing to use.  No side effects reported when needed.     Supplements: Patient is receive  vitamin C 1,000 mg daily, vitamin D3 2,000 units daily, vitamin B12 daily, a daily multivitamin, and calcium magnesium zinc daily. Denies any current issues.     Today's Vitals: There were no vitals taken for this visit.    BP Readings from Last 3 Encounters:   01/29/24 94/65   01/26/24 109/49   01/11/24 96/57     Wt Readings from Last 5 Encounters:   01/26/24 150 lb 2.1 oz (68.1 kg)   01/11/24 151 lb (68.5 kg)   12/30/23 151 lb 0.2 oz (68.5 kg)   12/21/23 166 lb 0.1 oz (75.3 kg)   12/01/23 153 lb 14.1 oz (69.8 kg)     ----------------  Post Discharge Medication Reconciliation Status: discharge medications reconciled, continue medications without change.    I spent 17 minutes with this patient today. A copy of the visit note was provided to the patient's provider(s).    A summary of these recommendations was mailed to the patient.    Naveed Ortiz, PharmD, BCACP  Medication Therapy Management Pharmacist  Voicemail: [phone]      Telemedicine Visit Details  Type of service:  Telephone visit  Start Time:  11:15 AM  End Time:  11:32 AM     Medication Therapy Recommendations  No medication therapy recommendations to display

## 2024-02-21 DIAGNOSIS — E23.0 PANHYPOPITUITARISM (H): ICD-10-CM

## 2024-02-21 DIAGNOSIS — E29.1 HYPOGONADISM MALE: ICD-10-CM

## 2024-02-21 NOTE — TELEPHONE ENCOUNTER
TESTOSTERONE CYP 200MG/ML SDV 1ML   Inject 0.25 mLs (50 mg) into the muscle once a week - Intramuscular     Last Written Prescription Date:  12/15/23  Last Fill Quantity: 4 ml ,   # refills: 1  Last Office Visit : 2/8/23  Future Office visit:  7/24/24    Routing refill request to provider for review/approval because:  Controlled substance     5/10/23    TESTOSTTOTAL 956*     11/11/24  PSA 0.58 01/11/2024     Lab Results   Component Value Date    ALT 33 01/21/2024    ALT 28 05/23/2021     Recent Labs   Lab Test 10/31/23  1300 11/29/16  0430   CHOL 152  --    HDL 28*  --    LDL 79  --    TRIG 225* 100

## 2024-02-21 NOTE — TELEPHONE ENCOUNTER
Pt mother calling requesting refill for testosterone, completely out, would like an increase in refills.     Annie Iqbal RN

## 2024-02-22 RX ORDER — TESTOSTERONE CYPIONATE 200 MG/ML
50 INJECTION, SOLUTION INTRAMUSCULAR WEEKLY
Qty: 4 ML | Refills: 1 | Status: SHIPPED | OUTPATIENT
Start: 2024-02-22 | End: 2024-03-21

## 2024-02-22 RX ORDER — TESTOSTERONE CYPIONATE 200 MG/ML
INJECTION, SOLUTION INTRAMUSCULAR
Qty: 4 ML | Refills: 5 | Status: SHIPPED | OUTPATIENT
Start: 2024-02-22 | End: 2024-05-29

## 2024-03-19 ENCOUNTER — NURSE TRIAGE (OUTPATIENT)
Dept: NURSING | Facility: CLINIC | Age: 64
End: 2024-03-19
Payer: MEDICARE

## 2024-03-20 NOTE — TELEPHONE ENCOUNTER
"  Nurse Triage SBAR    Is this a 2nd Level Triage? YES, LICENSED PRACTITIONER REVIEW IS REQUIRED    Situation: Caller reporting that patient hadn't been as responsive as usual, and not giving a \"thumbs up\" signal.  He may have had a seizure last evening.    Background: Patient is non verbal with a history of seizures and is on medication.    Assessment: Caller reporting that patient hadn't been as responsive as usual, and not giving a \"thumbs up\" signal.  Guardian of patient thinks he may have had a seizure last night 3-4 minutes and had been extra sleepy all day.  Denies fever, cough.  He is now back to baseline at time of call.    Protocol Recommended Disposition:   Go to ED Now (Or PCP Triage)    Recommendation: Secondary triage     Paged to provider  Paged Dr. Queen on call via smart web/answering service/cell, who recommends to watch him tonight since he is feeling better.  Call the clinic in the morning.  If he has another seizure go to ED immediately.    Liz Houser RN  [facility]      Does the patient meet one of the following criteria for ADS visit consideration? 16+ years old, with an [facility] PCP     TIP  Providers, please consider if this condition is appropriate for management at one of our Acute and Diagnostic Services sites.     If patient is a good candidate, please use dotphrase <dot>triageresponse and select Refer to ADS to document.      Reason for Disposition   [1] Wants to sleep after the seizure AND [2] persists much longer than usual    Additional Information   Negative: First seizure ever   Negative: [1] Seizure AND [2] lasts > 5 minutes   Negative: [1] Two or more seizures AND [2] stays confused between seizures   Negative: Bluish (or gray) lips or face now   Negative: Head injury caused the seizure   Negative: Pregnant   Negative: Postpartum (from 0 to 6 weeks after delivery)   Negative: Known poisoning or overdose   Negative: Seizure in a swimming pool   Negative: Diabetes " mellitus  (Exception: Now alert, acting normal, and has normal blood glucose [e.g., > 70 mg/dL or 3.9 mmol/L].)   Negative: [1] Unresponsive (can't be awakened) after the seizure stops AND [2] persists > 5 minutes   Negative: [1] Acting confused (e.g., disoriented, slurred speech) after the seizure stops AND [2] persists > 30 minutes   Negative: Sounds like a life-threatening emergency to the triager   Negative: Severe headache  (Note: Most people have a headache after a seizure.)   Negative: Second seizure occurs on the same day   Negative: Substance use (drug use) or unhealthy alcohol use, known or suspected   Negative: Fever > 100.4 F (38.0 C)    Protocols used: [id number]

## 2024-03-21 ENCOUNTER — APPOINTMENT (OUTPATIENT)
Dept: GENERAL RADIOLOGY | Facility: CLINIC | Age: 64
DRG: 871 | End: 2024-03-21
Attending: STUDENT IN AN ORGANIZED HEALTH CARE EDUCATION/TRAINING PROGRAM
Payer: MEDICARE

## 2024-03-21 ENCOUNTER — MEDICAL CORRESPONDENCE (OUTPATIENT)
Dept: HEALTH INFORMATION MANAGEMENT | Facility: CLINIC | Age: 64
End: 2024-03-21

## 2024-03-21 ENCOUNTER — HOSPITAL ENCOUNTER (INPATIENT)
Facility: CLINIC | Age: 64
LOS: 5 days | Discharge: HOME HEALTH | DRG: 871 | End: 2024-03-26
Attending: STUDENT IN AN ORGANIZED HEALTH CARE EDUCATION/TRAINING PROGRAM | Admitting: HOSPITALIST
Payer: MEDICARE

## 2024-03-21 DIAGNOSIS — Z99.3 WHEELCHAIR DEPENDENCE: Primary | ICD-10-CM

## 2024-03-21 DIAGNOSIS — E87.5 HYPERKALEMIA: ICD-10-CM

## 2024-03-21 DIAGNOSIS — J69.0 ASPIRATION PNEUMONIA OF RIGHT MIDDLE LOBE, UNSPECIFIED ASPIRATION PNEUMONIA TYPE (H): ICD-10-CM

## 2024-03-21 DIAGNOSIS — Z12.11 SCREEN FOR COLON CANCER: ICD-10-CM

## 2024-03-21 DIAGNOSIS — G81.01 FLACCID HEMIPLEGIA AFFECTING RIGHT DOMINANT SIDE, UNSPECIFIED ETIOLOGY (H): ICD-10-CM

## 2024-03-21 LAB
ALBUMIN SERPL BCG-MCNC: 4.1 G/DL (ref 3.5–5.2)
ALBUMIN UR-MCNC: 50 MG/DL
ALP SERPL-CCNC: ABNORMAL U/L
ALT SERPL W P-5'-P-CCNC: ABNORMAL U/L
AMORPH CRY #/AREA URNS HPF: ABNORMAL /HPF
ANION GAP SERPL CALCULATED.3IONS-SCNC: 13 MMOL/L (ref 7–15)
APPEARANCE UR: CLEAR
AST SERPL W P-5'-P-CCNC: ABNORMAL U/L
ATRIAL RATE - MUSE: 90 BPM
BASOPHILS # BLD AUTO: 0.1 10E3/UL (ref 0–0.2)
BASOPHILS NFR BLD AUTO: 1 %
BILIRUB SERPL-MCNC: 0.3 MG/DL
BILIRUB UR QL STRIP: NEGATIVE
BUN SERPL-MCNC: 17.4 MG/DL (ref 8–23)
CALCIUM SERPL-MCNC: 9.3 MG/DL (ref 8.8–10.2)
CHLORIDE SERPL-SCNC: 88 MMOL/L (ref 98–107)
COLOR UR AUTO: YELLOW
CREAT SERPL-MCNC: 0.93 MG/DL (ref 0.67–1.17)
DEPRECATED HCO3 PLAS-SCNC: 29 MMOL/L (ref 22–29)
DIASTOLIC BLOOD PRESSURE - MUSE: NORMAL MMHG
EGFRCR SERPLBLD CKD-EPI 2021: >90 ML/MIN/1.73M2
EOSINOPHIL # BLD AUTO: 0.3 10E3/UL (ref 0–0.7)
EOSINOPHIL NFR BLD AUTO: 3 %
ERYTHROCYTE [DISTWIDTH] IN BLOOD BY AUTOMATED COUNT: 14.6 % (ref 10–15)
FLUAV RNA SPEC QL NAA+PROBE: NEGATIVE
FLUBV RNA RESP QL NAA+PROBE: NEGATIVE
GLUCOSE SERPL-MCNC: 114 MG/DL (ref 70–99)
GLUCOSE UR STRIP-MCNC: NEGATIVE MG/DL
HCT VFR BLD AUTO: 43.9 % (ref 40–53)
HGB BLD-MCNC: 14.3 G/DL (ref 13.3–17.7)
HGB UR QL STRIP: NEGATIVE
IMM GRANULOCYTES # BLD: 0.1 10E3/UL
IMM GRANULOCYTES NFR BLD: 0 %
INTERPRETATION ECG - MUSE: NORMAL
KETONES UR STRIP-MCNC: NEGATIVE MG/DL
LACTATE SERPL-SCNC: 1.7 MMOL/L (ref 0.7–2)
LEUKOCYTE ESTERASE UR QL STRIP: NEGATIVE
LYMPHOCYTES # BLD AUTO: 2.4 10E3/UL (ref 0.8–5.3)
LYMPHOCYTES NFR BLD AUTO: 19 %
MCH RBC QN AUTO: 27.5 PG (ref 26.5–33)
MCHC RBC AUTO-ENTMCNC: 32.6 G/DL (ref 31.5–36.5)
MCV RBC AUTO: 84 FL (ref 78–100)
MONOCYTES # BLD AUTO: 1.4 10E3/UL (ref 0–1.3)
MONOCYTES NFR BLD AUTO: 11 %
NEUTROPHILS # BLD AUTO: 8.7 10E3/UL (ref 1.6–8.3)
NEUTROPHILS NFR BLD AUTO: 66 %
NITRATE UR QL: NEGATIVE
NRBC # BLD AUTO: 0 10E3/UL
NRBC BLD AUTO-RTO: 0 /100
P AXIS - MUSE: 64 DEGREES
PH UR STRIP: 8.5 [PH] (ref 5–7)
PLATELET # BLD AUTO: 259 10E3/UL (ref 150–450)
POTASSIUM SERPL-SCNC: 5.5 MMOL/L (ref 3.4–5.3)
PR INTERVAL - MUSE: 174 MS
PROT SERPL-MCNC: 8.2 G/DL (ref 6.4–8.3)
QRS DURATION - MUSE: 88 MS
QT - MUSE: 376 MS
QTC - MUSE: 459 MS
R AXIS - MUSE: -53 DEGREES
RBC # BLD AUTO: 5.2 10E6/UL (ref 4.4–5.9)
RBC URINE: <1 /HPF
RSV RNA SPEC NAA+PROBE: NEGATIVE
SARS-COV-2 RNA RESP QL NAA+PROBE: NEGATIVE
SODIUM SERPL-SCNC: 130 MMOL/L (ref 135–145)
SP GR UR STRIP: 1.03 (ref 1–1.03)
SYSTOLIC BLOOD PRESSURE - MUSE: NORMAL MMHG
T AXIS - MUSE: 47 DEGREES
UROBILINOGEN UR STRIP-MCNC: 2 MG/DL
VENTRICULAR RATE- MUSE: 90 BPM
WBC # BLD AUTO: 13.1 10E3/UL (ref 4–11)
WBC URINE: 1 /HPF

## 2024-03-21 PROCEDURE — 87637 SARSCOV2&INF A&B&RSV AMP PRB: CPT | Performed by: STUDENT IN AN ORGANIZED HEALTH CARE EDUCATION/TRAINING PROGRAM

## 2024-03-21 PROCEDURE — 81001 URINALYSIS AUTO W/SCOPE: CPT | Performed by: STUDENT IN AN ORGANIZED HEALTH CARE EDUCATION/TRAINING PROGRAM

## 2024-03-21 PROCEDURE — 258N000003 HC RX IP 258 OP 636: Performed by: STUDENT IN AN ORGANIZED HEALTH CARE EDUCATION/TRAINING PROGRAM

## 2024-03-21 PROCEDURE — 87149 DNA/RNA DIRECT PROBE: CPT | Performed by: STUDENT IN AN ORGANIZED HEALTH CARE EDUCATION/TRAINING PROGRAM

## 2024-03-21 PROCEDURE — 87186 SC STD MICRODIL/AGAR DIL: CPT | Performed by: STUDENT IN AN ORGANIZED HEALTH CARE EDUCATION/TRAINING PROGRAM

## 2024-03-21 PROCEDURE — 120N000001 HC R&B MED SURG/OB

## 2024-03-21 PROCEDURE — 93005 ELECTROCARDIOGRAM TRACING: CPT

## 2024-03-21 PROCEDURE — 96365 THER/PROPH/DIAG IV INF INIT: CPT

## 2024-03-21 PROCEDURE — 71045 X-RAY EXAM CHEST 1 VIEW: CPT

## 2024-03-21 PROCEDURE — 82040 ASSAY OF SERUM ALBUMIN: CPT | Performed by: STUDENT IN AN ORGANIZED HEALTH CARE EDUCATION/TRAINING PROGRAM

## 2024-03-21 PROCEDURE — 36415 COLL VENOUS BLD VENIPUNCTURE: CPT | Performed by: STUDENT IN AN ORGANIZED HEALTH CARE EDUCATION/TRAINING PROGRAM

## 2024-03-21 PROCEDURE — 99285 EMERGENCY DEPT VISIT HI MDM: CPT | Mod: 25

## 2024-03-21 PROCEDURE — 250N000013 HC RX MED GY IP 250 OP 250 PS 637: Performed by: HOSPITALIST

## 2024-03-21 PROCEDURE — 83605 ASSAY OF LACTIC ACID: CPT | Performed by: STUDENT IN AN ORGANIZED HEALTH CARE EDUCATION/TRAINING PROGRAM

## 2024-03-21 PROCEDURE — 85025 COMPLETE CBC W/AUTO DIFF WBC: CPT | Performed by: STUDENT IN AN ORGANIZED HEALTH CARE EDUCATION/TRAINING PROGRAM

## 2024-03-21 PROCEDURE — 99223 1ST HOSP IP/OBS HIGH 75: CPT | Mod: AI | Performed by: HOSPITALIST

## 2024-03-21 PROCEDURE — 96361 HYDRATE IV INFUSION ADD-ON: CPT

## 2024-03-21 PROCEDURE — 250N000011 HC RX IP 250 OP 636: Performed by: STUDENT IN AN ORGANIZED HEALTH CARE EDUCATION/TRAINING PROGRAM

## 2024-03-21 RX ORDER — PIPERACILLIN SODIUM, TAZOBACTAM SODIUM 3; .375 G/15ML; G/15ML
3.38 INJECTION, POWDER, LYOPHILIZED, FOR SOLUTION INTRAVENOUS ONCE
Status: COMPLETED | OUTPATIENT
Start: 2024-03-21 | End: 2024-03-21

## 2024-03-21 RX ORDER — LIDOCAINE 40 MG/G
CREAM TOPICAL
Status: DISCONTINUED | OUTPATIENT
Start: 2024-03-21 | End: 2024-03-26 | Stop reason: HOSPADM

## 2024-03-21 RX ORDER — LEVOTHYROXINE SODIUM 112 UG/1
112 TABLET ORAL DAILY
Status: DISCONTINUED | OUTPATIENT
Start: 2024-03-22 | End: 2024-03-26 | Stop reason: HOSPADM

## 2024-03-21 RX ORDER — METOCLOPRAMIDE HYDROCHLORIDE 5 MG/5ML
10 SOLUTION ORAL
Status: DISCONTINUED | OUTPATIENT
Start: 2024-03-21 | End: 2024-03-26 | Stop reason: HOSPADM

## 2024-03-21 RX ORDER — CARBAMAZEPINE 100 MG/5ML
100 SUSPENSION ORAL DAILY
Status: DISCONTINUED | OUTPATIENT
Start: 2024-03-22 | End: 2024-03-26 | Stop reason: HOSPADM

## 2024-03-21 RX ORDER — CHOLECALCIFEROL (VITAMIN D3) 50 MCG
50 TABLET ORAL DAILY
Status: DISCONTINUED | OUTPATIENT
Start: 2024-03-22 | End: 2024-03-26 | Stop reason: HOSPADM

## 2024-03-21 RX ORDER — CARBAMAZEPINE 100 MG/5ML
100 SUSPENSION ORAL DAILY
Status: DISCONTINUED | OUTPATIENT
Start: 2024-03-22 | End: 2024-03-21

## 2024-03-21 RX ORDER — ALBUTEROL SULFATE 5 MG/ML
2.5 SOLUTION RESPIRATORY (INHALATION) EVERY 6 HOURS PRN
Status: DISCONTINUED | OUTPATIENT
Start: 2024-03-21 | End: 2024-03-26 | Stop reason: HOSPADM

## 2024-03-21 RX ORDER — PIPERACILLIN SODIUM, TAZOBACTAM SODIUM 3; .375 G/15ML; G/15ML
3.38 INJECTION, POWDER, LYOPHILIZED, FOR SOLUTION INTRAVENOUS EVERY 6 HOURS
Status: DISCONTINUED | OUTPATIENT
Start: 2024-03-22 | End: 2024-03-26 | Stop reason: HOSPADM

## 2024-03-21 RX ORDER — SODIUM BICARBONATE 325 MG/1
325 TABLET ORAL DAILY
Status: DISCONTINUED | OUTPATIENT
Start: 2024-03-22 | End: 2024-03-26 | Stop reason: HOSPADM

## 2024-03-21 RX ORDER — ASCORBIC ACID 500 MG
1000 TABLET ORAL DAILY
Status: DISCONTINUED | OUTPATIENT
Start: 2024-03-22 | End: 2024-03-26 | Stop reason: HOSPADM

## 2024-03-21 RX ORDER — MULTIVITAMIN,THERAPEUTIC
1 TABLET ORAL DAILY
Status: DISCONTINUED | OUTPATIENT
Start: 2024-03-22 | End: 2024-03-23

## 2024-03-21 RX ORDER — CARBAMAZEPINE 100 MG/5ML
150 SUSPENSION ORAL 3 TIMES DAILY
Status: DISCONTINUED | OUTPATIENT
Start: 2024-03-22 | End: 2024-03-26 | Stop reason: HOSPADM

## 2024-03-21 RX ADMIN — SODIUM CHLORIDE 1000 ML: 9 INJECTION, SOLUTION INTRAVENOUS at 18:24

## 2024-03-21 RX ADMIN — METOCLOPRAMIDE HYDROCHLORIDE 10 MG: 5 SOLUTION ORAL at 23:58

## 2024-03-21 RX ADMIN — SODIUM CHLORIDE, POTASSIUM CHLORIDE, SODIUM LACTATE AND CALCIUM CHLORIDE 500 ML: 600; 310; 30; 20 INJECTION, SOLUTION INTRAVENOUS at 20:39

## 2024-03-21 RX ADMIN — HYDROCORTISONE SODIUM SUCCINATE 100 MG: 100 INJECTION, POWDER, FOR SOLUTION INTRAMUSCULAR; INTRAVENOUS at 20:39

## 2024-03-21 RX ADMIN — CARBAMAZEPINE 150 MG: 100 SUSPENSION ORAL at 23:58

## 2024-03-21 RX ADMIN — PIPERACILLIN AND TAZOBACTAM 3.38 G: 3; .375 INJECTION, POWDER, FOR SOLUTION INTRAVENOUS at 19:38

## 2024-03-21 RX ADMIN — BRIVARACETAM 100 MG: 10 SOLUTION ORAL at 23:58

## 2024-03-21 ASSESSMENT — ACTIVITIES OF DAILY LIVING (ADL)
ADLS_ACUITY_SCORE: 45
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
## 2024-03-21 NOTE — ED NOTES
Bed: ED27  Expected date:   Expected time:   Means of arrival:   Comments:  Kerri 1 - 63M possible UTI

## 2024-03-22 LAB
ANION GAP SERPL CALCULATED.3IONS-SCNC: 14 MMOL/L (ref 7–15)
BUN SERPL-MCNC: 13.9 MG/DL (ref 8–23)
CALCIUM SERPL-MCNC: 8.6 MG/DL (ref 8.8–10.2)
CHLORIDE SERPL-SCNC: 94 MMOL/L (ref 98–107)
CREAT SERPL-MCNC: 0.89 MG/DL (ref 0.67–1.17)
DEPRECATED HCO3 PLAS-SCNC: 25 MMOL/L (ref 22–29)
EGFRCR SERPLBLD CKD-EPI 2021: >90 ML/MIN/1.73M2
ENTEROCOCCUS FAECALIS: NOT DETECTED
ENTEROCOCCUS FAECIUM: NOT DETECTED
ERYTHROCYTE [DISTWIDTH] IN BLOOD BY AUTOMATED COUNT: 14.6 % (ref 10–15)
GLUCOSE BLDC GLUCOMTR-MCNC: 120 MG/DL (ref 70–99)
GLUCOSE BLDC GLUCOMTR-MCNC: 145 MG/DL (ref 70–99)
GLUCOSE SERPL-MCNC: 103 MG/DL (ref 70–99)
HCT VFR BLD AUTO: 42.8 % (ref 40–53)
HGB BLD-MCNC: 13.9 G/DL (ref 13.3–17.7)
LISTERIA SPECIES (DETECTED/NOT DETECTED): NOT DETECTED
MAGNESIUM SERPL-MCNC: 2.1 MG/DL (ref 1.7–2.3)
MCH RBC QN AUTO: 28 PG (ref 26.5–33)
MCHC RBC AUTO-ENTMCNC: 32.5 G/DL (ref 31.5–36.5)
MCV RBC AUTO: 86 FL (ref 78–100)
PHOSPHATE SERPL-MCNC: 2.7 MG/DL (ref 2.5–4.5)
PLATELET # BLD AUTO: 216 10E3/UL (ref 150–450)
POTASSIUM SERPL-SCNC: 4.1 MMOL/L (ref 3.4–5.3)
RBC # BLD AUTO: 4.96 10E6/UL (ref 4.4–5.9)
SODIUM SERPL-SCNC: 133 MMOL/L (ref 135–145)
STAPHYLOCOCCUS AUREUS: NOT DETECTED
STAPHYLOCOCCUS EPIDERMIDIS: DETECTED
STAPHYLOCOCCUS LUGDUNENSIS: NOT DETECTED
STREPTOCOCCUS AGALACTIAE: NOT DETECTED
STREPTOCOCCUS ANGINOSUS GROUP: NOT DETECTED
STREPTOCOCCUS PNEUMONIAE: NOT DETECTED
STREPTOCOCCUS PYOGENES: NOT DETECTED
STREPTOCOCCUS SPECIES: NOT DETECTED
WBC # BLD AUTO: 10.8 10E3/UL (ref 4–11)

## 2024-03-22 PROCEDURE — 250N000013 HC RX MED GY IP 250 OP 250 PS 637: Performed by: HOSPITALIST

## 2024-03-22 PROCEDURE — 85041 AUTOMATED RBC COUNT: CPT | Performed by: HOSPITALIST

## 2024-03-22 PROCEDURE — 99233 SBSQ HOSP IP/OBS HIGH 50: CPT | Performed by: STUDENT IN AN ORGANIZED HEALTH CARE EDUCATION/TRAINING PROGRAM

## 2024-03-22 PROCEDURE — 80048 BASIC METABOLIC PNL TOTAL CA: CPT | Performed by: HOSPITALIST

## 2024-03-22 PROCEDURE — 250N000011 HC RX IP 250 OP 636: Performed by: HOSPITALIST

## 2024-03-22 PROCEDURE — 999N000226 HC STATISTIC SLP IP EVAL DEFER

## 2024-03-22 PROCEDURE — 36415 COLL VENOUS BLD VENIPUNCTURE: CPT | Performed by: HOSPITALIST

## 2024-03-22 PROCEDURE — G0463 HOSPITAL OUTPT CLINIC VISIT: HCPCS

## 2024-03-22 PROCEDURE — 83735 ASSAY OF MAGNESIUM: CPT | Performed by: HOSPITALIST

## 2024-03-22 PROCEDURE — 120N000001 HC R&B MED SURG/OB

## 2024-03-22 PROCEDURE — 84100 ASSAY OF PHOSPHORUS: CPT | Performed by: HOSPITALIST

## 2024-03-22 RX ORDER — DEXTROSE MONOHYDRATE 100 MG/ML
INJECTION, SOLUTION INTRAVENOUS CONTINUOUS PRN
Status: DISCONTINUED | OUTPATIENT
Start: 2024-03-22 | End: 2024-03-26 | Stop reason: HOSPADM

## 2024-03-22 RX ORDER — AMOXICILLIN AND CLAVULANATE POTASSIUM 400; 57 MG/5ML; MG/5ML
875 POWDER, FOR SUSPENSION ORAL ONCE
Status: COMPLETED | OUTPATIENT
Start: 2024-03-22 | End: 2024-03-23

## 2024-03-22 RX ORDER — GUAIFENESIN 600 MG/1
15 TABLET, EXTENDED RELEASE ORAL DAILY
Status: DISCONTINUED | OUTPATIENT
Start: 2024-03-22 | End: 2024-03-26 | Stop reason: HOSPADM

## 2024-03-22 RX ADMIN — BRIVARACETAM 100 MG: 10 SOLUTION ORAL at 10:18

## 2024-03-22 RX ADMIN — HYDROCORTISONE SODIUM SUCCINATE 100 MG: 100 INJECTION, POWDER, FOR SOLUTION INTRAMUSCULAR; INTRAVENOUS at 06:16

## 2024-03-22 RX ADMIN — Medication 15 ML: at 16:58

## 2024-03-22 RX ADMIN — CARBAMAZEPINE 100 MG: 100 SUSPENSION ORAL at 19:07

## 2024-03-22 RX ADMIN — CARBAMAZEPINE 150 MG: 100 SUSPENSION ORAL at 06:09

## 2024-03-22 RX ADMIN — MULTIVITAMIN TABLET 1 TABLET: TABLET at 10:18

## 2024-03-22 RX ADMIN — PIPERACILLIN AND TAZOBACTAM 3.38 G: 3; .375 INJECTION, POWDER, FOR SOLUTION INTRAVENOUS at 10:19

## 2024-03-22 RX ADMIN — Medication 50 MCG: at 10:18

## 2024-03-22 RX ADMIN — METOCLOPRAMIDE HYDROCHLORIDE 10 MG: 5 SOLUTION ORAL at 11:22

## 2024-03-22 RX ADMIN — METOCLOPRAMIDE HYDROCHLORIDE 10 MG: 5 SOLUTION ORAL at 16:58

## 2024-03-22 RX ADMIN — BRIVARACETAM 100 MG: 10 SOLUTION ORAL at 21:54

## 2024-03-22 RX ADMIN — LEVOTHYROXINE SODIUM 112 MCG: 112 TABLET ORAL at 06:20

## 2024-03-22 RX ADMIN — CYANOCOBALAMIN TAB 1000 MCG 2500 MCG: 1000 TAB at 10:18

## 2024-03-22 RX ADMIN — OXYCODONE HYDROCHLORIDE AND ACETAMINOPHEN 1000 MG: 500 TABLET ORAL at 10:18

## 2024-03-22 RX ADMIN — POTASSIUM & SODIUM PHOSPHATES POWDER PACK 280-160-250 MG 1 PACKET: 280-160-250 PACK at 10:18

## 2024-03-22 RX ADMIN — PIPERACILLIN AND TAZOBACTAM 3.38 G: 3; .375 INJECTION, POWDER, FOR SOLUTION INTRAVENOUS at 02:48

## 2024-03-22 RX ADMIN — Medication 20 MG: at 06:09

## 2024-03-22 RX ADMIN — METOCLOPRAMIDE HYDROCHLORIDE 10 MG: 5 SOLUTION ORAL at 21:22

## 2024-03-22 RX ADMIN — Medication 60 ML: at 11:21

## 2024-03-22 RX ADMIN — SODIUM BICARBONATE 325 MG: 325 TABLET ORAL at 10:18

## 2024-03-22 RX ADMIN — METOCLOPRAMIDE HYDROCHLORIDE 10 MG: 5 SOLUTION ORAL at 06:09

## 2024-03-22 RX ADMIN — CARBAMAZEPINE 150 MG: 100 SUSPENSION ORAL at 11:22

## 2024-03-22 ASSESSMENT — ACTIVITIES OF DAILY LIVING (ADL)
ADLS_ACUITY_SCORE: 63
ADLS_ACUITY_SCORE: 61
ADLS_ACUITY_SCORE: 63
ADLS_ACUITY_SCORE: 61
ADLS_ACUITY_SCORE: 63
ADLS_ACUITY_SCORE: 57
ADLS_ACUITY_SCORE: 63
ADLS_ACUITY_SCORE: 57
ADLS_ACUITY_SCORE: 57
ADLS_ACUITY_SCORE: 63
ADLS_ACUITY_SCORE: 61
ADLS_ACUITY_SCORE: 63
ADLS_ACUITY_SCORE: 63
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
## 2024-03-22 NOTE — PLAN OF CARE
Goal Outcome Evaluation:         3/22/2024 NOC    Cognitive Concerns/ Orientation : Alert, non verbal    BEHAVIOR & AGGRESSION TOOL COLOR: Green   CIWA SCORE: N/A    ABNL VS/O2: VSS on room air   MOBILITY: Total care, turn and reposition every 2 hours. Up with lift   PAIN MANAGMENT: No s/s pain noted   DIET: NPO  BOWEL/BLADDER: Incontinent of urine, external catheter in place, One small BM this shift   ABNL LAB/BG: K+ 5.5, , WBC 13.1  DRAIN/DEVICES: Peripheral IV SL left AC with intermittent antibiotics. PEG tube for tube feedings    TELEMETRY RHYTHM: NSR  SKIN: Blanchable redness to coccyx, mepilex in place.  Scattered scabs, abrasions and bruising.  Rash to face  TESTS/PROCEDURES: N/A   D/C DAY/GOALS/PLACE: Pending   OTHER IMPORTANT INFO: Contact isolation for VRE/MRSA.

## 2024-03-22 NOTE — CONSULTS
"CLINICAL NUTRITION SERVICES  -  ASSESSMENT NOTE    Recommendations Ordered by Registered Dietitian (RD):     - Start TF:   Jevity 1.5 @ 55 mL/hr x 22 hours (hold for Levothyroxine)  - Add expedite daily for wound healing     Total Provisions = 1915 kcal, 87 g protein, 25 g fiber, 920 mL free water  Free Water Flush 160 mL q 4 hours = 960 mL    - Ordered MVI/M     Malnutrition:   % Weight Loss:  Weight loss does not meet criteria for malnutrition  % Intake:  Decreased intake does not meet criteria for malnutrition  Subcutaneous Fat Loss: Low stores at baseline  Muscle Loss:  Low stores at baseline   Fluid Retention:  None noted    Malnutrition Diagnosis: Patient does not meet two of the above criteria necessary for diagnosing malnutrition     REASON FOR ASSESSMENT  Keyon Farias is a 63 year old male seen by Registered Dietitian for Provider Order - Registered Dietitian to Assess and Order TF per Medical Nutrition Therapy Protocol.    NUTRITION HISTORY    - Pt is well-known to nutrition services over the years. Most recently here in January 2024. Chronic dysphagia - TF reliant and NPO.   - GJT in place. J port is used for feeds.     - Home tube feed regimen is documented previously as follows:   5.5 cartons Jevity 1.5, to provide 1304 mL, 1935 kcal, 83 g protein, 281 g CHO, 27 g fiber. Will start on continuous feeds this admit.    - Visited patient in room. Could not get any nutrition history at this time d/t patient being nonverbal and no family in room.    CURRENT NUTRITION ORDERS  Diet Order: NPO     Current Intake/Tolerance:  N/A    NUTRITION FOCUSED PHYSICAL ASSESSMENT FOR DIAGNOSING MALNUTRITION)  Yes          Observed:    Low fat/muscle stores - baseline     Obtained from Chart/Interdisciplinary Team:  Pressure injuries on buttock and sacrum - WOC consulted    ANTHROPOMETRICS  Height: 5'10\"  Weight: 66.6 kg, 146 lbs 13.22 oz  Body mass index is 21.07 kg/m .  Weight Status:  Normal BMI  IBW: 75.5 kg  % IBW: " 88%  Weight History: wt seems to be trending down. 5% wt loss in 3 months.  Wt Readings from Last 20 Encounters:   03/21/24 66.6 kg (146 lb 13.2 oz)   01/26/24 68.1 kg (150 lb 2.1 oz)   01/11/24 68.5 kg (151 lb)   12/30/23 68.5 kg (151 lb 0.2 oz)   12/21/23 75.3 kg (166 lb 0.1 oz)   12/01/23 69.8 kg (153 lb 14.1 oz)   11/26/23 74.8 kg (165 lb)   11/07/23 74.8 kg (165 lb)   10/26/23 70.8 kg (156 lb 1.4 oz)   10/04/23 83.9 kg (185 lb)   10/03/23 84.2 kg (185 lb 10 oz)   09/21/23 77.1 kg (170 lb)   09/12/23 76.8 kg (169 lb 5 oz)   08/12/23 78.4 kg (172 lb 13.5 oz)   05/10/23 72.6 kg (160 lb)   04/06/23 79.4 kg (175 lb)   03/10/23 73.9 kg (163 lb)   02/15/23 73.9 kg (163 lb)   01/23/23 63.5 kg (140 lb)   01/09/23 63.5 kg (140 lb)     LABS  Labs reviewed    MEDICATIONS  Vitamin B12, levothyroxine, reglan, MVI/M, vitamin C, vitamin D3    ASSESSED NUTRITION NEEDS PER APPROVED PRACTICE GUIDELINES:  Dosing Weight 66.6 kg  Estimated Energy Needs: 4270-1221 kcals (25-30 Kcal/Kg)  Justification: maintenance  Estimated Protein Needs:  grams protein (1.2-1.5 g pro/Kg)  Justification: preservation of lean body mass, wounds  Estimated Fluid Needs: 1 mL/kcal or per provider pending fluid status    NUTRITION DIAGNOSIS:  Inadequate protein-energy intake related to transfer of care as evidenced by TF has been off since hospitalization yesterday    NUTRITION INTERVENTIONS  Recommendations / Nutrition Prescription  Start TF:  Jevity 1.5 @ 55 mL/hr x 22 hours (hold for Levothyroxine)  - Add expedite daily for wound healing    Total Provisions = 1915 kcal, 87 g protein, 25 g fiber, 920 mL free water  Flush 60 mL q 4 hours. Increase to 160 mL q 4 hours once IVF off.     - Ordered MVI/M    Implementation  Nutrition education: Per Provider order if indicated   EN Composition, EN Schedule, and Feeding Tube Flush - ordered    Nutrition Goals  TF to meet % estimated needs at goal    MONITORING AND EVALUATION:  Progress towards goals  will be monitored and evaluated per protocol and Practice Guidelines    Sun Abad RD, LD  Clinical Dietitian - [facility]

## 2024-03-22 NOTE — PROGRESS NOTES
Stevenson pagejunior Chavez with result of BC drawn on 3-21-24 of gram positive cocci in clusters. No new orders

## 2024-03-22 NOTE — PHARMACY-ADMISSION MEDICATION HISTORY
Pharmacist Admission Medication History    Admission medication history is complete. The information provided in this note is only as accurate as the sources available at the time of the update.    Information Source(s): Family member and CareEverywhere/SureScripts via in-person    Pertinent Information:   --  Pt's mother is giving patient pink salt instead of sodium bicarb right now (unsure if it's refill issue).    Changes made to PTA medication list:  Added: pink salt  Deleted: None  Changed: None    Allergies reviewed with patient and updates made in EHR: no    Medication History Completed By: Silvia Ram PharmD 3/21/2024 9:58 PM    PTA Med List   Medication Sig Last Dose    acetaminophen (TYLENOL) 325 MG tablet Take 650 mg by mouth every 6 hours as needed for mild pain prn    albuterol (PROVENTIL) (5 MG/ML) 0.5% neb solution Take 0.5 mLs (2.5 mg) by nebulization every 6 hours as needed for shortness of breath, wheezing or cough 0700 1100 1500 1900 with mucomyst prn    bacitracin 500 UNIT/GM external ointment Apply topically daily as needed for wound care To PEG site. prn    Brivaracetam (BRIVIACT) 10 MG/ML solution 100 mg by Oral or Feeding Tube route 2 times daily 0900, 2100 3/21/2024 at am    Calcium-Magnesium-Zinc 333-133-5 MG TABS per tablet Take 1 tablet by mouth daily 3/21/2024 at am    carBAMazepine (TEGRETOL) 100 MG/5ML suspension 7.5 mLs (150 mg) by Oral or Feeding Tube route 3 times daily At 06:00, 12:00, and 24:00 for seizures 3/21/2024 at noon    carBAMazepine (TEGRETOL) 100 MG/5ML suspension 100 mg by Per Feeding Tube route daily Take at 1800 3/20/2024    COMPOUNDED NON-CONTROLLED SUBSTANCE (CMPD RX) - PHARMACY TO MIX COMPOUNDED MEDICATION Scopolamine 0.4mg capsules - take  2 capsule by feeding tube three times daily as needed prn    Cyanocobalamin (VITAMIN B-12 PO) 2,500 mcg by Per Feeding Tube route daily 3/21/2024 at am    glycopyrrolate (ROBINUL) 1 MG tablet TAKE 1 TABLET(1 MG) BY MOUTH TWICE  DAILY AS NEEDED FOR SECRETIONS prn    guaiFENesin (MUCINEX) 600 MG 12 hr tablet Take 1 tablet (600 mg) by mouth 2 times daily as needed for congestion prn    hydrocortisone (CORTAID) 1 % external cream Apply topically 2 times daily as needed Apply to reddened memo areas as needed prn    hydrocortisone (CORTEF) 5 MG tablet Take 15 mg (3 tablets) in the morning and 7.5 mg (1.5 tablet)  at 2:00 PM. During illness patient takes more as a stress dose. Please increase the dose as directed. (Patient taking differently: Take 15 mg (3 tablets) in the morning and 7.5 mg (1.5 tablet)  at 3:00 PM. Per feeding tube. During illness patient takes more as a stress dose. Mother reports doubling each dose for fever) 3/21/2024 at x2    levothyroxine (SYNTHROID/LEVOTHROID) 112 MCG tablet Take 1 tablet (112 mcg) by mouth daily 3/21/2024 at 05:00    metoclopramide (REGLAN) 10 MG/10ML SOLN solution Take 10 mLs (10 mg) by mouth 4 times daily (before meals and nightly) 0800, 1200, 1600, 2000 Disconnects bag before administration, then waits 45 mins before reconnecting after giving the medication 3/21/2024 at 16:00    miconazole (MICATIN) 2 % external powder Apply topically 2 times daily as needed prn    multivitamin, therapeutic (THERA-VIT) TABS tablet 1 tablet by Per Feeding Tube route daily 3/21/2024 at am    mupirocin (BACTROBAN) 2 % external ointment APPLY TOPICALLY TO THE AFFECTED AREA TWICE DAILY AS NEEDED prn    pantoprazole (PROTONIX) 2 mg/mL SUSP suspension TAKE 20ML PER FEEDING TUBE DAILY 3/21/2024 at am    potassium & sodium phosphates (NEUTRA-PHOS) 280-160-250 MG Packet Take 1 packet by mouth daily 3/21/2024 at am    testosterone cypionate (DEPOTESTOSTERONE) 200 MG/ML injection INJECT 0.25ML IN THE MUSCLE ONCE A WEEK. DISCARD REMAINDER OF VIAL 3/14/2024    UNABLE TO FIND Take 0.125 teaspoonful by mouth 2 times daily MEDICATION NAME: pink salt 1/8 teaspoon po bid (instead of sodium bicarb). 3/21/2024 at am    vitamin C (ASCORBIC  ACID) 1000 MG TABS 1,000 mg by Oral or Feeding Tube route daily  3/21/2024 at am    vitamin D3 (CHOLECALCIFEROL) 2000 units (50 mcg) tablet 2,000 Units by Per Feeding Tube route daily 3/21/2024 at am

## 2024-03-22 NOTE — PROGRESS NOTES
Summary:  Aspiration pneumonia  DATE & TIME: 3/22/2024 day   Cognitive Concerns/ Orientation : Alert, non verbal    BEHAVIOR & AGGRESSION TOOL COLOR: Green   CIWA SCORE: N/A    ABNL VS/O2: VSS on RA  MOBILITY: 2A, turn and repo q2h  PAIN MANAGMENT: No pain indicated using nonverbal scale  DIET: strict NPO, TF at 30 mL/hr until 1630 then increase to goal rate of 55 mL/hr  BOWEL/BLADDER: Incontinent. Male external cath in place. No BM this shift  ABNL LAB/BG: Na 133, calcium 8.6  DRAIN/DEVICES: Left piv SL, intermittent abx. PEG tube infusing TF and for meds  TELEMETRY RHYTHM: NSR  SKIN:  Mepilex to sacrum, blanchable redness. Scattered scabs and bruises. Rash to face.  TESTS/PROCEDURES: N/A   D/C DAY/GOALS/PLACE: 1-2 days, IV abx  OTHER IMPORTANT INFO: Contact precautions for VRE/MRSA.

## 2024-03-22 NOTE — PLAN OF CARE
SLP: Pt will known to this department d/t severe dysphagia and recurrent episodes of aspiration pneumonia for several years. Strict NPO with oral cares and suction have been recommended. No indication for repeat swallow evaluation at this time with no change in status, no benefit from prior SLP services and poor prognosis for pt being able to start PO diet without continued high risk of aspiration. Rec continue NPO/PEG plan with anti-reflux precautions to avoid aspiration of TF. SLP to sign off.
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
## 2024-03-22 NOTE — PROGRESS NOTES
RECEIVING UNIT ED HANDOFF REVIEW    ED Nurse Handoff Report was reviewed by: Shante Villarreal RN on March 21, 2024 at 10:11 PM

## 2024-03-23 LAB
ANION GAP SERPL CALCULATED.3IONS-SCNC: 10 MMOL/L (ref 7–15)
BUN SERPL-MCNC: 15.1 MG/DL (ref 8–23)
CALCIUM SERPL-MCNC: 8.6 MG/DL (ref 8.8–10.2)
CHLORIDE SERPL-SCNC: 99 MMOL/L (ref 98–107)
CREAT SERPL-MCNC: 0.96 MG/DL (ref 0.67–1.17)
DEPRECATED HCO3 PLAS-SCNC: 27 MMOL/L (ref 22–29)
EGFRCR SERPLBLD CKD-EPI 2021: 90 ML/MIN/1.73M2
GLUCOSE BLDC GLUCOMTR-MCNC: 122 MG/DL (ref 70–99)
GLUCOSE SERPL-MCNC: 123 MG/DL (ref 70–99)
MAGNESIUM SERPL-MCNC: 2.2 MG/DL (ref 1.7–2.3)
PHOSPHATE SERPL-MCNC: 3.2 MG/DL (ref 2.5–4.5)
POTASSIUM SERPL-SCNC: 3.8 MMOL/L (ref 3.4–5.3)
SODIUM SERPL-SCNC: 136 MMOL/L (ref 135–145)

## 2024-03-23 PROCEDURE — 80048 BASIC METABOLIC PNL TOTAL CA: CPT | Performed by: HOSPITALIST

## 2024-03-23 PROCEDURE — 999N000127 HC STATISTIC PERIPHERAL IV START W US GUIDANCE

## 2024-03-23 PROCEDURE — 83735 ASSAY OF MAGNESIUM: CPT | Performed by: HOSPITALIST

## 2024-03-23 PROCEDURE — 36415 COLL VENOUS BLD VENIPUNCTURE: CPT | Performed by: STUDENT IN AN ORGANIZED HEALTH CARE EDUCATION/TRAINING PROGRAM

## 2024-03-23 PROCEDURE — 250N000013 HC RX MED GY IP 250 OP 250 PS 637

## 2024-03-23 PROCEDURE — 250N000011 HC RX IP 250 OP 636: Performed by: STUDENT IN AN ORGANIZED HEALTH CARE EDUCATION/TRAINING PROGRAM

## 2024-03-23 PROCEDURE — 250N000011 HC RX IP 250 OP 636: Performed by: HOSPITALIST

## 2024-03-23 PROCEDURE — 120N000001 HC R&B MED SURG/OB

## 2024-03-23 PROCEDURE — 250N000013 HC RX MED GY IP 250 OP 250 PS 637: Performed by: HOSPITALIST

## 2024-03-23 PROCEDURE — 87040 BLOOD CULTURE FOR BACTERIA: CPT | Performed by: STUDENT IN AN ORGANIZED HEALTH CARE EDUCATION/TRAINING PROGRAM

## 2024-03-23 PROCEDURE — 99232 SBSQ HOSP IP/OBS MODERATE 35: CPT | Performed by: STUDENT IN AN ORGANIZED HEALTH CARE EDUCATION/TRAINING PROGRAM

## 2024-03-23 PROCEDURE — 250N000013 HC RX MED GY IP 250 OP 250 PS 637: Performed by: STUDENT IN AN ORGANIZED HEALTH CARE EDUCATION/TRAINING PROGRAM

## 2024-03-23 PROCEDURE — 84100 ASSAY OF PHOSPHORUS: CPT | Performed by: HOSPITALIST

## 2024-03-23 RX ORDER — GLYCOPYRROLATE 1 MG/1
1 TABLET ORAL 3 TIMES DAILY PRN
Status: DISCONTINUED | OUTPATIENT
Start: 2024-03-23 | End: 2024-03-26 | Stop reason: HOSPADM

## 2024-03-23 RX ADMIN — PIPERACILLIN AND TAZOBACTAM 3.38 G: 3; .375 INJECTION, POWDER, FOR SOLUTION INTRAVENOUS at 01:30

## 2024-03-23 RX ADMIN — Medication 20 MG: at 06:58

## 2024-03-23 RX ADMIN — METOCLOPRAMIDE HYDROCHLORIDE 10 MG: 5 SOLUTION ORAL at 16:55

## 2024-03-23 RX ADMIN — CARBAMAZEPINE 150 MG: 100 SUSPENSION ORAL at 00:36

## 2024-03-23 RX ADMIN — BRIVARACETAM 100 MG: 10 SOLUTION ORAL at 22:38

## 2024-03-23 RX ADMIN — Medication 60 ML: at 13:12

## 2024-03-23 RX ADMIN — Medication 50 MCG: at 09:00

## 2024-03-23 RX ADMIN — SODIUM BICARBONATE 325 MG: 325 TABLET ORAL at 13:13

## 2024-03-23 RX ADMIN — AMOXICILLIN AND CLAVULANATE POTASSIUM 875 MG: 400; 57 POWDER, FOR SUSPENSION ORAL at 00:36

## 2024-03-23 RX ADMIN — BRIVARACETAM 100 MG: 10 SOLUTION ORAL at 09:00

## 2024-03-23 RX ADMIN — CYANOCOBALAMIN TAB 1000 MCG 2500 MCG: 1000 TAB at 13:13

## 2024-03-23 RX ADMIN — MULTIVITAMIN TABLET 1 TABLET: TABLET at 09:00

## 2024-03-23 RX ADMIN — POTASSIUM & SODIUM PHOSPHATES POWDER PACK 280-160-250 MG 1 PACKET: 280-160-250 PACK at 08:59

## 2024-03-23 RX ADMIN — PIPERACILLIN AND TAZOBACTAM 3.38 G: 3; .375 INJECTION, POWDER, FOR SOLUTION INTRAVENOUS at 14:17

## 2024-03-23 RX ADMIN — HYDROCORTISONE SODIUM SUCCINATE 50 MG: 100 INJECTION, POWDER, FOR SOLUTION INTRAMUSCULAR; INTRAVENOUS at 09:00

## 2024-03-23 RX ADMIN — PIPERACILLIN AND TAZOBACTAM 3.38 G: 3; .375 INJECTION, POWDER, FOR SOLUTION INTRAVENOUS at 09:00

## 2024-03-23 RX ADMIN — METOCLOPRAMIDE HYDROCHLORIDE 10 MG: 5 SOLUTION ORAL at 22:37

## 2024-03-23 RX ADMIN — METOCLOPRAMIDE HYDROCHLORIDE 10 MG: 5 SOLUTION ORAL at 13:12

## 2024-03-23 RX ADMIN — METOCLOPRAMIDE HYDROCHLORIDE 10 MG: 5 SOLUTION ORAL at 08:59

## 2024-03-23 RX ADMIN — CARBAMAZEPINE 150 MG: 100 SUSPENSION ORAL at 06:58

## 2024-03-23 RX ADMIN — PIPERACILLIN AND TAZOBACTAM 3.38 G: 3; .375 INJECTION, POWDER, FOR SOLUTION INTRAVENOUS at 22:36

## 2024-03-23 RX ADMIN — Medication 15 ML: at 14:17

## 2024-03-23 RX ADMIN — HYDROCORTISONE 7.5 MG: 5 TABLET ORAL at 16:55

## 2024-03-23 RX ADMIN — OXYCODONE HYDROCHLORIDE AND ACETAMINOPHEN 1000 MG: 500 TABLET ORAL at 09:00

## 2024-03-23 RX ADMIN — CARBAMAZEPINE 150 MG: 100 SUSPENSION ORAL at 14:17

## 2024-03-23 RX ADMIN — LEVOTHYROXINE SODIUM 112 MCG: 112 TABLET ORAL at 07:09

## 2024-03-23 RX ADMIN — CARBAMAZEPINE 100 MG: 100 SUSPENSION ORAL at 18:55

## 2024-03-23 ASSESSMENT — ACTIVITIES OF DAILY LIVING (ADL)
ADLS_ACUITY_SCORE: 63
ADLS_ACUITY_SCORE: 59
ADLS_ACUITY_SCORE: 63
ADLS_ACUITY_SCORE: 59
ADLS_ACUITY_SCORE: 63
ADLS_ACUITY_SCORE: 59

## 2024-03-23 NOTE — SIGNIFICANT EVENT
Significant Event Note    Time of event: 6:04 AM March 23, 2024    Description of event:  Paged for blood cultures positive for gram positive cocci in clusters.    Plan:  Zosyn continued for now pending speciation    Discussed with: bedside nurse    Tyler Garcia MD

## 2024-03-23 NOTE — PLAN OF CARE
"Summary:  Aspiration pneumonia  DATE & TIME: 3/22/2024 7p-11p           Cognitive Concerns/ Orientation : Alert, non verbal - was able to say \"hi\"   BEHAVIOR & AGGRESSION TOOL COLOR: Green   CIWA SCORE: N/A       ABNL VS/O2: VSS on RA, continuous pulse ox on   MOBILITY: lift, Ax2, turn and repo q2h, skin care precautions in place   PAIN MANAGMENT: No pain indicated using nonverbal scale  DIET: strict NPO, TF at 55 mL/hr  BOWEL/BLADDER: Incontinent. Male external cath in place. No BM this shift  ABNL LAB/BG:   DRAIN/DEVICES: No IV access, Float resource, Flyer, and Anesthesia attempted access with no success. On call provider is aware-he changed IV abx to one time dose of Augmentin through g-tube.   TELEMETRY RHYTHM: NSR  SKIN:  Mepilex to sacrum, blanchable redness to sacrum and right ankle/foot. Scattered scabs and bruises. Rash to face.  TESTS/PROCEDURES: N/A   D/C DAY/GOALS/PLACE: TBD  OTHER IMPORTANT INFO: Contact precautions for VRE/MRSA.           "

## 2024-03-23 NOTE — PROVIDER NOTIFICATION
"MD Notification    Notified Person: MD    Notified Person Name: Dr. Hernandez Heidykieran    Notification Date/Time: 3/22/24 @ 1941    Notification Interaction: [employer] messaging     Purpose of Notification: \"Infectious disease called with critical result. Blood cultures from 3/21/24 drawn at 1844: staphylococcus epidermidis that is methicillin resistant.    Orders Received: none, provider replied: \"Already on abx an this contaminate\"     Comments:    "

## 2024-03-23 NOTE — PLAN OF CARE
Goal Outcome Evaluation:      Plan of Care Reviewed With: patient, parent    Overall Patient Progress: no changeOverall Patient Progress: no change    Shift Note [id number]  Orientation/Neuro: Alert, nonverbal, can communicate with facial expressions and thumbs up/down  CV: VSS  VS: /72 (BP Location: Right arm)   Pulse 58   Temp 98.4  F (36.9  C) (Axillary)   Resp 20   Wt 67.1 kg (147 lb 14.9 oz)   SpO2 97%   BMI 21.23 kg/m     Respiratory: On RA. Thick secretions present, oral suctioning provided x1. Infrequent productive cough. LS diminished.   GI/: PEG tube with continuous TF. Incontinent of B&B - external catheter in place, medium soft BM this shift.   Skin: Sacral blanchable redness - mepilex in place. R heel wound ANANYA - heels elevated with pillows.   Activity: A2 lift, total cares, T/R Q2H  Diet: NPO. Continuous TF @ 55 mL/hr (goal rate), with 160 mL flushes Q4H.  IV: PIV, SL - intermittent IV abx  Drips: n/a  B  Labs/Tests: Repeat BCs pending  Pain: Denies, no nonverbal s/sx of pain  Other:  Consults:  Plan: Zosyn, Repeat BCs pending
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
## 2024-03-23 NOTE — PLAN OF CARE
Summary:  Aspiration pneumonia  DATE & TIME: 3/22/2024 1742-1419   Cognitive Concerns/ Orientation: Alert, non-verbal--will give a thumbs up or smile at times. Enjoys the fish channel #65; was pulling at pulse ox sticker/clip (O2 sats %) and pulling off tele stickers and gown  BEHAVIOR & AGGRESSION TOOL COLOR: Green   ABNL VS/O2: VSS, room   MOBILITY: Assist-2 lift, turn/repo q2h  PAIN MANAGMENT: No pain indicated using nonverbal scale  DIET: strict NPO, TF at goal rate of 55 mL/hr with 160 mL free water flushes programmed  BOWEL/BLADDER: Incontinent. Male external cath in place. No BM this shift  ABNL LAB/BG: Na 133, calcium 8.6; Positive blood cultures;   DRAIN/DEVICES: After numerous attempts (adarsh resource/flyer/anesthesiology + pm ICU RN) L elbow PIV saline locked, IV Zosyn; Augmentin given via PEG tube x1 prior to IV access  TELEMETRY RHYTHM: NSR  SKIN:  Mepilex to sacrum, blanchable redness--WOC signed off until/unless injury appears; Scattered scabs and bruises. Rash to face.  TESTS/PROCEDURES: N/A   D/C DAY/GOALS/PLACE: 1-2 days, IV abx  OTHER IMPORTANT INFO: Contact precautions for VRE/MRSA.

## 2024-03-23 NOTE — PROGRESS NOTES
"MD Notification    Notified Person: MD    Notified Person Name: Dr. Scott Santosjacinda    Notification Date/Time:March 23, 2024 6:03 AM     Notification Interaction: Web page    Purpose of Notification: Positive blood cultures-sample from 03/21/2024-gram (+) cocci in clusters. Receiving IV Zosyn and received 1 time dose of Augmentin through PEG tube prior to establishing IV access.    Orders Received: Continue with Zosyn \"pending speciation\" per MD    Comments:    "

## 2024-03-23 NOTE — PROGRESS NOTES
Used all available resources yet unable to start PIV. Reported to Dr Sanders who ordered one dose of abx per GT and address IV in AM.

## 2024-03-24 LAB
ANION GAP SERPL CALCULATED.3IONS-SCNC: 12 MMOL/L (ref 7–15)
BACTERIA BLD CULT: ABNORMAL
BACTERIA BLD CULT: ABNORMAL
BUN SERPL-MCNC: 14.3 MG/DL (ref 8–23)
CALCIUM SERPL-MCNC: 8.6 MG/DL (ref 8.8–10.2)
CHLORIDE SERPL-SCNC: 104 MMOL/L (ref 98–107)
CREAT SERPL-MCNC: 0.92 MG/DL (ref 0.67–1.17)
DEPRECATED HCO3 PLAS-SCNC: 26 MMOL/L (ref 22–29)
EGFRCR SERPLBLD CKD-EPI 2021: >90 ML/MIN/1.73M2
GLUCOSE SERPL-MCNC: 112 MG/DL (ref 70–99)
MAGNESIUM SERPL-MCNC: 2.2 MG/DL (ref 1.7–2.3)
PHOSPHATE SERPL-MCNC: 3.3 MG/DL (ref 2.5–4.5)
POTASSIUM SERPL-SCNC: 3.7 MMOL/L (ref 3.4–5.3)
SODIUM SERPL-SCNC: 142 MMOL/L (ref 135–145)

## 2024-03-24 PROCEDURE — 36415 COLL VENOUS BLD VENIPUNCTURE: CPT | Performed by: STUDENT IN AN ORGANIZED HEALTH CARE EDUCATION/TRAINING PROGRAM

## 2024-03-24 PROCEDURE — 84100 ASSAY OF PHOSPHORUS: CPT | Performed by: HOSPITALIST

## 2024-03-24 PROCEDURE — 250N000011 HC RX IP 250 OP 636

## 2024-03-24 PROCEDURE — 250N000013 HC RX MED GY IP 250 OP 250 PS 637: Performed by: STUDENT IN AN ORGANIZED HEALTH CARE EDUCATION/TRAINING PROGRAM

## 2024-03-24 PROCEDURE — 250N000011 HC RX IP 250 OP 636: Performed by: HOSPITALIST

## 2024-03-24 PROCEDURE — 250N000009 HC RX 250: Performed by: HOSPITALIST

## 2024-03-24 PROCEDURE — 250N000013 HC RX MED GY IP 250 OP 250 PS 637: Performed by: HOSPITALIST

## 2024-03-24 PROCEDURE — 80156 ASSAY CARBAMAZEPINE TOTAL: CPT | Performed by: STUDENT IN AN ORGANIZED HEALTH CARE EDUCATION/TRAINING PROGRAM

## 2024-03-24 PROCEDURE — 83735 ASSAY OF MAGNESIUM: CPT | Performed by: HOSPITALIST

## 2024-03-24 PROCEDURE — 80048 BASIC METABOLIC PNL TOTAL CA: CPT | Performed by: HOSPITALIST

## 2024-03-24 PROCEDURE — 120N000001 HC R&B MED SURG/OB

## 2024-03-24 PROCEDURE — 258N000003 HC RX IP 258 OP 636

## 2024-03-24 PROCEDURE — 99232 SBSQ HOSP IP/OBS MODERATE 35: CPT | Performed by: STUDENT IN AN ORGANIZED HEALTH CARE EDUCATION/TRAINING PROGRAM

## 2024-03-24 RX ADMIN — CYANOCOBALAMIN TAB 1000 MCG 2500 MCG: 1000 TAB at 08:36

## 2024-03-24 RX ADMIN — BRIVARACETAM 100 MG: 10 SOLUTION ORAL at 21:15

## 2024-03-24 RX ADMIN — POTASSIUM & SODIUM PHOSPHATES POWDER PACK 280-160-250 MG 1 PACKET: 280-160-250 PACK at 08:36

## 2024-03-24 RX ADMIN — HYDROCORTISONE 7.5 MG: 5 TABLET ORAL at 15:55

## 2024-03-24 RX ADMIN — CARBAMAZEPINE 150 MG: 100 SUSPENSION ORAL at 12:42

## 2024-03-24 RX ADMIN — BRIVARACETAM 100 MG: 10 SOLUTION ORAL at 08:36

## 2024-03-24 RX ADMIN — Medication 20 MG: at 08:36

## 2024-03-24 RX ADMIN — Medication 15 ML: at 12:42

## 2024-03-24 RX ADMIN — Medication 50 MCG: at 08:35

## 2024-03-24 RX ADMIN — METOCLOPRAMIDE HYDROCHLORIDE 10 MG: 5 SOLUTION ORAL at 08:34

## 2024-03-24 RX ADMIN — HYDROCORTISONE 15 MG: 10 TABLET ORAL at 08:35

## 2024-03-24 RX ADMIN — OXYCODONE HYDROCHLORIDE AND ACETAMINOPHEN 1000 MG: 500 TABLET ORAL at 08:36

## 2024-03-24 RX ADMIN — PIPERACILLIN AND TAZOBACTAM 3.38 G: 3; .375 INJECTION, POWDER, FOR SOLUTION INTRAVENOUS at 05:38

## 2024-03-24 RX ADMIN — CARBAMAZEPINE 150 MG: 100 SUSPENSION ORAL at 00:12

## 2024-03-24 RX ADMIN — SODIUM BICARBONATE 325 MG: 325 TABLET ORAL at 08:36

## 2024-03-24 RX ADMIN — CARBAMAZEPINE 100 MG: 100 SUSPENSION ORAL at 18:22

## 2024-03-24 RX ADMIN — LEVOTHYROXINE SODIUM 112 MCG: 112 TABLET ORAL at 05:59

## 2024-03-24 RX ADMIN — PIPERACILLIN AND TAZOBACTAM 3.38 G: 3; .375 INJECTION, POWDER, FOR SOLUTION INTRAVENOUS at 10:46

## 2024-03-24 RX ADMIN — PIPERACILLIN AND TAZOBACTAM 3.38 G: 3; .375 INJECTION, POWDER, FOR SOLUTION INTRAVENOUS at 18:22

## 2024-03-24 RX ADMIN — METOCLOPRAMIDE HYDROCHLORIDE 10 MG: 5 SOLUTION ORAL at 12:42

## 2024-03-24 RX ADMIN — VANCOMYCIN HYDROCHLORIDE 1250 MG: 10 INJECTION, POWDER, LYOPHILIZED, FOR SOLUTION INTRAVENOUS at 11:57

## 2024-03-24 RX ADMIN — METOCLOPRAMIDE HYDROCHLORIDE 10 MG: 5 SOLUTION ORAL at 15:55

## 2024-03-24 RX ADMIN — CARBAMAZEPINE 150 MG: 100 SUSPENSION ORAL at 06:00

## 2024-03-24 RX ADMIN — METOCLOPRAMIDE HYDROCHLORIDE 10 MG: 5 SOLUTION ORAL at 21:15

## 2024-03-24 ASSESSMENT — ACTIVITIES OF DAILY LIVING (ADL)
ADLS_ACUITY_SCORE: 63
ADLS_ACUITY_SCORE: 59
ADLS_ACUITY_SCORE: 59
ADLS_ACUITY_SCORE: 63
ADLS_ACUITY_SCORE: 63
ADLS_ACUITY_SCORE: 59
ADLS_ACUITY_SCORE: 59
ADLS_ACUITY_SCORE: 63
ADLS_ACUITY_SCORE: 59
ADLS_ACUITY_SCORE: 63
ADLS_ACUITY_SCORE: 59
ADLS_ACUITY_SCORE: 63
ADLS_ACUITY_SCORE: 63
ADLS_ACUITY_SCORE: 59
ADLS_ACUITY_SCORE: 63
ADLS_ACUITY_SCORE: 63
ADLS_ACUITY_SCORE: 59
ADLS_ACUITY_SCORE: 63

## 2024-03-24 NOTE — PLAN OF CARE
Goal Outcome Evaluation:      Plan of Care Reviewed With: patient    Summary:  Aspiration pneumonia  DATE & TIME: 3/23/2024, 6192-8706  Cognitive Concerns/ Orientation: Alert, non-verbal--will give a thumbs up or smile at times.   BEHAVIOR & AGGRESSION TOOL COLOR: Green   ABNL VS/O2: VSS on RA  MOBILITY: Assist-2 lift, turn/repo q2h  PAIN MANAGMENT: No pain indicated using nonverbal scale  DIET: strict NPO, TF at goal rate of 55 mL/hr with 160 mL free water flushes-programmed, tolerating  BOWEL/BLADDER: Incontinent B&B. Male external cath in place/good UOP. x2 BM this shift  ABNL LAB/BG: calcium 8.6; Positive blood cultures  DRAIN/DEVICES:L. PIV/SL   TELEMETRY RHYTHM: discontinued  SKIN:  Mepilex to sacrum, blanchable redness--WOC signed off until/unless injury appears; Scattered scabs and bruises. Rash/flakiness to face.  TESTS/PROCEDURES: N/A   D/C DAY/GOALS/PLACE: 1-2 days, IV abx  OTHER IMPORTANT INFO: Contact precautions for VRE/MRSA. Mother, Savannah at the bedside this evening and updated on plan of care. Per mother, he likes to watch action movies and game shows.

## 2024-03-24 NOTE — PHARMACY-VANCOMYCIN DOSING SERVICE
"Pharmacy Vancomycin Initial Note  Date of Service 2024  Patient's  1960  63 year old, male    Indication: Bacteremia    Current estimated CrCl = Estimated Creatinine Clearance: 80.5 mL/min (based on SCr of 0.92 mg/dL).    Creatinine for last 3 days  3/21/2024:  6:22 PM Creatinine 0.93 mg/dL  3/22/2024: 11:28 AM Creatinine 0.89 mg/dL  3/23/2024:  8:53 AM Creatinine 0.96 mg/dL  3/24/2024:  8:55 AM Creatinine 0.92 mg/dL    Recent Vancomycin Level(s) for last 3 days  No results found for requested labs within last 3 days.      Vancomycin IV Administrations (past 72 hours)        No vancomycin orders with administrations in past 72 hours.                    Nephrotoxins and other renal medications (From now, onward)      Start     Dose/Rate Route Frequency Ordered Stop    24 1000  vancomycin (VANCOCIN) 1,250 mg in 0.9% NaCl 250 mL intermittent infusion         1,250 mg  over 2 Hours Intravenous EVERY 18 HOURS 24 0952      24 0130  piperacillin-tazobactam (ZOSYN) 3.375 g vial to attach to  mL bag        Note to Pharmacy: For [facility], [facility] and [facility]: For Zosyn-naive patients, use the \"Zosyn initial dose + extended infusion\" order panel.    3.375 g  over 30 Minutes Intravenous EVERY 6 HOURS 24 2237              Contrast Orders - past 72 hours (72h ago, onward)      None            [employer] Prediction of Planned Initial Vancomycin Regimen  Regimen: 1250 mg IV every 18 hours.  Start time: 10:00 on 2024  Exposure target: AUC24 (range)400-600 mg/L.hr   AUC24,ss: 499 mg/L.hr  Probability of AUC24 > 400: 75 %  Ctrough,ss: 14.4 mg/L  Probability of Ctrough,ss > 20: 21 %  Probability of nephrotoxicity (Lodise ERNESTO ): 10 %    Plan:  Start vancomycin  1250 mg IV q18h.   Vancomycin monitoring method: AUC  Vancomycin therapeutic monitoring goal: 400-600 mg*h/L  Pharmacy will check vancomycin levels as appropriate in 1-3 Days.    Serum creatinine levels will be ordered daily for the " first week of therapy and at least twice weekly for subsequent weeks.      Liz Rodriguez, KALYN, PharmD, BCPS
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
## 2024-03-24 NOTE — PLAN OF CARE
"Goal Outcome Evaluation:      Plan of Care Reviewed With: patient    Overall Patient Progress: improvingOverall Patient Progress: improving    Outcome Evaluation: Alert most of shift.  Responded frequently w/ \"Thumbs up\".  Tolerated IV Abx tx well.    Patient vital signs are at baseline: Yes  Patient able to ambulate as they were prior to admission or with assist devices provided by therapies during their stay:  Yes  Baseline contracted Chairfast  Patient MUST void prior to discharge:  Yes   via External Male Catheter  Patient able to tolerate oral intake:  No,  Reason:  NPO at Baseline,  Tube Feed  Pain has adequate pain control using Oral analgesics:  Yes  Does patient have an identified :  Yes  Has goal D/C date and time been discussed with patient:  No,  Reason:  Discharge pending Lab Blood Cultures collected 03/23/2024.    Dx:Aspiration Pneumonia  POD#n/a    Contact precautions for VRE (01/2017) and MRSA  (10/2017)    A&OxUTA d/t non-verbal.   CMS HECTOR d/t non-verbal. .   VSS on RA.   Up with Ax2 w/ Ceiling Lift for Transfers.  Not OOB this shift.  Frequent Turn/Repo.   Voiding via External Male Catheter.   Left PIV IV Abx 12:00-15:00.  NPO Diet. 100% Tube Feed @55mL/hr w/ 160mL Flush  Denies Pain.   Continue to monitor.       "

## 2024-03-24 NOTE — PLAN OF CARE
Summary:  Aspiration pneumonia  DATE & TIME: 3/23/2024 3552-1330            Cognitive Concerns/ Orientation: Alert, non-verbal--will give a thumbs up or smile at times. Enjoys the fish channel #65; calmer tonight as compared to previous overnight  BEHAVIOR & AGGRESSION TOOL COLOR: Green   ABNL VS/O2: VSS, room air  MOBILITY: Assist-2 lift, turn/repo q2h  PAIN MANAGMENT: No pain indicated using nonverbal scale  DIET: strict NPO, TF at goal rate of 55 mL/hr with 160 mL free water flushes programmed (new bottle replaced at 0615)  BOWEL/BLADDER: Incontinent. Male external cath in place. No BM this shift  ABNL LAB/BG: Na 136;  Positive blood cultures  DRAIN/DEVICES: L forearm PIV saline locked, IV Zosyn  SKIN:  Mepilex to sacrum, blanchable redness--WOC signed off until/unless injury appears; Scattered scabs and bruises. Rash to face.  TESTS/PROCEDURES: N/A   D/C DAY/GOALS/PLACE: Pending, receiving IV abx  OTHER IMPORTANT INFO: Contact precautions for VRE (01/2017) and MRSA  (10/2017)

## 2024-03-25 LAB
ANION GAP SERPL CALCULATED.3IONS-SCNC: 11 MMOL/L (ref 7–15)
BACTERIA BLD CULT: ABNORMAL
BACTERIA BLD CULT: ABNORMAL
BUN SERPL-MCNC: 13.6 MG/DL (ref 8–23)
CALCIUM SERPL-MCNC: 8.4 MG/DL (ref 8.8–10.2)
CHLORIDE SERPL-SCNC: 103 MMOL/L (ref 98–107)
CREAT SERPL-MCNC: 0.94 MG/DL (ref 0.67–1.17)
DEPRECATED HCO3 PLAS-SCNC: 25 MMOL/L (ref 22–29)
EGFRCR SERPLBLD CKD-EPI 2021: >90 ML/MIN/1.73M2
GLUCOSE SERPL-MCNC: 104 MG/DL (ref 70–99)
MAGNESIUM SERPL-MCNC: 2.1 MG/DL (ref 1.7–2.3)
PHOSPHATE SERPL-MCNC: 2.7 MG/DL (ref 2.5–4.5)
POTASSIUM SERPL-SCNC: 4.1 MMOL/L (ref 3.4–5.3)
SODIUM SERPL-SCNC: 139 MMOL/L (ref 135–145)

## 2024-03-25 PROCEDURE — 36415 COLL VENOUS BLD VENIPUNCTURE: CPT | Performed by: HOSPITALIST

## 2024-03-25 PROCEDURE — 258N000003 HC RX IP 258 OP 636: Performed by: HOSPITALIST

## 2024-03-25 PROCEDURE — 84100 ASSAY OF PHOSPHORUS: CPT | Performed by: HOSPITALIST

## 2024-03-25 PROCEDURE — 250N000011 HC RX IP 250 OP 636: Performed by: HOSPITALIST

## 2024-03-25 PROCEDURE — 120N000001 HC R&B MED SURG/OB

## 2024-03-25 PROCEDURE — 99232 SBSQ HOSP IP/OBS MODERATE 35: CPT | Performed by: STUDENT IN AN ORGANIZED HEALTH CARE EDUCATION/TRAINING PROGRAM

## 2024-03-25 PROCEDURE — 80048 BASIC METABOLIC PNL TOTAL CA: CPT | Performed by: HOSPITALIST

## 2024-03-25 PROCEDURE — 83735 ASSAY OF MAGNESIUM: CPT | Performed by: HOSPITALIST

## 2024-03-25 PROCEDURE — 250N000011 HC RX IP 250 OP 636

## 2024-03-25 PROCEDURE — 250N000013 HC RX MED GY IP 250 OP 250 PS 637: Performed by: HOSPITALIST

## 2024-03-25 PROCEDURE — 258N000003 HC RX IP 258 OP 636

## 2024-03-25 PROCEDURE — 250N000013 HC RX MED GY IP 250 OP 250 PS 637: Performed by: STUDENT IN AN ORGANIZED HEALTH CARE EDUCATION/TRAINING PROGRAM

## 2024-03-25 RX ADMIN — METOCLOPRAMIDE HYDROCHLORIDE 10 MG: 5 SOLUTION ORAL at 06:57

## 2024-03-25 RX ADMIN — CARBAMAZEPINE 100 MG: 100 SUSPENSION ORAL at 16:06

## 2024-03-25 RX ADMIN — OXYCODONE HYDROCHLORIDE AND ACETAMINOPHEN 1000 MG: 500 TABLET ORAL at 08:08

## 2024-03-25 RX ADMIN — BRIVARACETAM 100 MG: 10 SOLUTION ORAL at 21:45

## 2024-03-25 RX ADMIN — METOCLOPRAMIDE HYDROCHLORIDE 10 MG: 5 SOLUTION ORAL at 16:05

## 2024-03-25 RX ADMIN — VANCOMYCIN HYDROCHLORIDE 1250 MG: 10 INJECTION, POWDER, LYOPHILIZED, FOR SOLUTION INTRAVENOUS at 22:34

## 2024-03-25 RX ADMIN — Medication 20 MG: at 08:07

## 2024-03-25 RX ADMIN — Medication 50 MCG: at 08:08

## 2024-03-25 RX ADMIN — CYANOCOBALAMIN TAB 1000 MCG 2500 MCG: 1000 TAB at 08:08

## 2024-03-25 RX ADMIN — VANCOMYCIN HYDROCHLORIDE 1250 MG: 10 INJECTION, POWDER, LYOPHILIZED, FOR SOLUTION INTRAVENOUS at 04:42

## 2024-03-25 RX ADMIN — CARBAMAZEPINE 150 MG: 100 SUSPENSION ORAL at 06:57

## 2024-03-25 RX ADMIN — HYDROCORTISONE 7.5 MG: 5 TABLET ORAL at 16:06

## 2024-03-25 RX ADMIN — BRIVARACETAM 100 MG: 10 SOLUTION ORAL at 08:48

## 2024-03-25 RX ADMIN — HYDROCORTISONE 15 MG: 10 TABLET ORAL at 08:08

## 2024-03-25 RX ADMIN — POTASSIUM & SODIUM PHOSPHATES POWDER PACK 280-160-250 MG 1 PACKET: 280-160-250 PACK at 08:08

## 2024-03-25 RX ADMIN — METOCLOPRAMIDE HYDROCHLORIDE 10 MG: 5 SOLUTION ORAL at 12:18

## 2024-03-25 RX ADMIN — Medication 15 ML: at 12:19

## 2024-03-25 RX ADMIN — PIPERACILLIN AND TAZOBACTAM 3.38 G: 3; .375 INJECTION, POWDER, FOR SOLUTION INTRAVENOUS at 12:18

## 2024-03-25 RX ADMIN — LEVOTHYROXINE SODIUM 112 MCG: 112 TABLET ORAL at 06:57

## 2024-03-25 RX ADMIN — CARBAMAZEPINE 150 MG: 100 SUSPENSION ORAL at 12:19

## 2024-03-25 RX ADMIN — PIPERACILLIN AND TAZOBACTAM 3.38 G: 3; .375 INJECTION, POWDER, FOR SOLUTION INTRAVENOUS at 07:11

## 2024-03-25 RX ADMIN — METOCLOPRAMIDE HYDROCHLORIDE 10 MG: 5 SOLUTION ORAL at 21:45

## 2024-03-25 RX ADMIN — SODIUM BICARBONATE 325 MG: 325 TABLET ORAL at 08:08

## 2024-03-25 RX ADMIN — CARBAMAZEPINE 150 MG: 100 SUSPENSION ORAL at 01:20

## 2024-03-25 RX ADMIN — PIPERACILLIN AND TAZOBACTAM 3.38 G: 3; .375 INJECTION, POWDER, FOR SOLUTION INTRAVENOUS at 17:56

## 2024-03-25 RX ADMIN — PIPERACILLIN AND TAZOBACTAM 3.38 G: 3; .375 INJECTION, POWDER, FOR SOLUTION INTRAVENOUS at 01:30

## 2024-03-25 RX ADMIN — Medication 60 ML: at 08:22

## 2024-03-25 ASSESSMENT — ACTIVITIES OF DAILY LIVING (ADL)
ADLS_ACUITY_SCORE: 67
ADLS_ACUITY_SCORE: 63
ADLS_ACUITY_SCORE: 67
DEPENDENT_IADLS:: MEAL PREPARATION;MEDICATION MANAGEMENT;TRANSPORTATION
ADLS_ACUITY_SCORE: 63
ADLS_ACUITY_SCORE: 67
ADLS_ACUITY_SCORE: 63
ADLS_ACUITY_SCORE: 63
ADLS_ACUITY_SCORE: 67

## 2024-03-25 NOTE — PLAN OF CARE
Goal Outcome Evaluation:      Plan of Care Reviewed With: patient    Summary:  Aspiration pneumonia  DATE & TIME: 3/24/2024, 9625-1204           Cognitive Concerns/ Orientation: Alert, non-verbal--will give a thumbs up or smile at times.   BEHAVIOR & AGGRESSION TOOL COLOR: Green   ABNL VS/O2: VSS, room air  MOBILITY: Assist-2 lift, turn/repo q2h  PAIN MANAGMENT: No pain indicated using nonverbal scale  DIET: strict NPO, TF at goal rate of 55 mL/hr with 160 mL free water flushes programmed. New bottle/tubing started this evening.   BOWEL/BLADDER: Incontinent. Male external cath in place. No BM this shift  ABNL LAB/BG: Calcium 8.6, Positive blood cultures  DRAIN/DEVICES: L forearm PIV saline locked, IV Zosyn  SKIN:  Mepilex to sacrum, blanchable redness--WOC signed off until/unless injury appears; Scattered scabs and bruises. Rash to face.  TESTS/PROCEDURES: N/A   D/C DAY/GOALS/PLACE: Pending, receiving IV abx  OTHER IMPORTANT INFO: Contact precautions for VRE (01/2017) and MRSA  (10/2017). Savannah updated on plan of care this evening via phone.

## 2024-03-25 NOTE — PLAN OF CARE
Goal Outcome Evaluation:  Summary: Aspiration pneumonia  DATE & TIME: 3/25/2024 0897-3122      Cognitive Concerns/ Orientation: HECTOR - nonverbal, alert  BEHAVIOR & AGGRESSION TOOL COLOR: Green   ABNL VS/O2: VSS on RA  MOBILITY: A2 w/ lift. T&R.  PAIN MANAGMENT: Denies w/ rFLACC  DIET: strict NPO, TF at goal rate of 55 mL/hr with 160 mL free water flushes programmed   BOWEL/BLADDER: Incontinent. Male external cath in place. BMx1 this shift.  ABNL LAB/BG: Positive blood cxs  DRAIN/DEVICES: L PIV SL  SKIN: Mepilex to sacrum, blanchable redness. Scattered scabs and bruises. Rash to face. GJ tube site WDL.  TESTS/PROCEDURES: NA  D/C DAY/GOALS/PLACE: Pending blood cultures after 2-3 days, receiving IV abx  OTHER IMPORTANT INFO: Contact for MRE and VRSA   this evening via phone. Mother is legal guardian. At bedside this shift.
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
## 2024-03-25 NOTE — PLAN OF CARE
DATE & TIME: 3/24/2024 0070-2823            Cognitive Concerns/ Orientation: Alert, non-verbal--will give a thumbs up or smile at times. Enjoys the fish channel #65; calm  BEHAVIOR & AGGRESSION TOOL COLOR: Green   ABNL VS/O2: Temp 99.1 otherwise VSS, room air  MOBILITY: Assist-2 lift, turn/repo q2h  PAIN MANAGMENT: No pain indicated using nonverbal scale  DIET: strict NPO, TF at goal rate of 55 mL/hr with 160 mL free water flushes programmed   BOWEL/BLADDER: Incontinent. Male external cath in place. No BM this shift  ABNL LAB/BG: Na 142; Positive blood cultures  DRAIN/DEVICES: L forearm PIV saline locked, IV Zosyn, Vanco  SKIN:  Mepilex to sacrum, blanchable redness--WOC signed off until/unless injury appears; Scattered scabs and bruises. Rash to face.  TESTS/PROCEDURES: N/A   D/C DAY/GOALS/PLACE: Pending, receiving IV abx  OTHER IMPORTANT INFO: Contact precautions for VRE (01/2017) and MRSA  (10/2017)

## 2024-03-26 VITALS
WEIGHT: 151.9 LBS | SYSTOLIC BLOOD PRESSURE: 123 MMHG | TEMPERATURE: 98.1 F | HEART RATE: 67 BPM | RESPIRATION RATE: 16 BRPM | BODY MASS INDEX: 21.75 KG/M2 | DIASTOLIC BLOOD PRESSURE: 65 MMHG | OXYGEN SATURATION: 100 % | HEIGHT: 70 IN

## 2024-03-26 LAB
CREAT SERPL-MCNC: 0.79 MG/DL (ref 0.67–1.17)
EGFRCR SERPLBLD CKD-EPI 2021: >90 ML/MIN/1.73M2

## 2024-03-26 PROCEDURE — 250N000013 HC RX MED GY IP 250 OP 250 PS 637: Performed by: STUDENT IN AN ORGANIZED HEALTH CARE EDUCATION/TRAINING PROGRAM

## 2024-03-26 PROCEDURE — 99239 HOSP IP/OBS DSCHRG MGMT >30: CPT | Performed by: STUDENT IN AN ORGANIZED HEALTH CARE EDUCATION/TRAINING PROGRAM

## 2024-03-26 PROCEDURE — 250N000011 HC RX IP 250 OP 636: Performed by: HOSPITALIST

## 2024-03-26 PROCEDURE — 82565 ASSAY OF CREATININE: CPT

## 2024-03-26 PROCEDURE — 250N000013 HC RX MED GY IP 250 OP 250 PS 637: Performed by: HOSPITALIST

## 2024-03-26 PROCEDURE — 36415 COLL VENOUS BLD VENIPUNCTURE: CPT

## 2024-03-26 RX ADMIN — PIPERACILLIN AND TAZOBACTAM 3.38 G: 3; .375 INJECTION, POWDER, FOR SOLUTION INTRAVENOUS at 00:53

## 2024-03-26 RX ADMIN — PIPERACILLIN AND TAZOBACTAM 3.38 G: 3; .375 INJECTION, POWDER, FOR SOLUTION INTRAVENOUS at 06:26

## 2024-03-26 RX ADMIN — Medication 50 MCG: at 08:56

## 2024-03-26 RX ADMIN — CARBAMAZEPINE 150 MG: 100 SUSPENSION ORAL at 01:01

## 2024-03-26 RX ADMIN — Medication 15 ML: at 13:00

## 2024-03-26 RX ADMIN — PIPERACILLIN AND TAZOBACTAM 3.38 G: 3; .375 INJECTION, POWDER, FOR SOLUTION INTRAVENOUS at 13:00

## 2024-03-26 RX ADMIN — CARBAMAZEPINE 150 MG: 100 SUSPENSION ORAL at 13:00

## 2024-03-26 RX ADMIN — CARBAMAZEPINE 150 MG: 100 SUSPENSION ORAL at 06:29

## 2024-03-26 RX ADMIN — SODIUM BICARBONATE 325 MG: 325 TABLET ORAL at 08:56

## 2024-03-26 RX ADMIN — BRIVARACETAM 100 MG: 10 SOLUTION ORAL at 08:56

## 2024-03-26 RX ADMIN — LEVOTHYROXINE SODIUM 112 MCG: 112 TABLET ORAL at 06:24

## 2024-03-26 RX ADMIN — METOCLOPRAMIDE HYDROCHLORIDE 10 MG: 5 SOLUTION ORAL at 13:00

## 2024-03-26 RX ADMIN — OXYCODONE HYDROCHLORIDE AND ACETAMINOPHEN 1000 MG: 500 TABLET ORAL at 08:56

## 2024-03-26 RX ADMIN — METOCLOPRAMIDE HYDROCHLORIDE 10 MG: 5 SOLUTION ORAL at 06:37

## 2024-03-26 RX ADMIN — Medication 60 ML: at 08:56

## 2024-03-26 RX ADMIN — POTASSIUM & SODIUM PHOSPHATES POWDER PACK 280-160-250 MG 1 PACKET: 280-160-250 PACK at 08:55

## 2024-03-26 RX ADMIN — CYANOCOBALAMIN TAB 1000 MCG 2500 MCG: 1000 TAB at 08:56

## 2024-03-26 RX ADMIN — HYDROCORTISONE 15 MG: 10 TABLET ORAL at 08:55

## 2024-03-26 RX ADMIN — Medication 20 MG: at 06:37

## 2024-03-26 ASSESSMENT — ACTIVITIES OF DAILY LIVING (ADL)
ADLS_ACUITY_SCORE: 67
ADLS_ACUITY_SCORE: 63
ADLS_ACUITY_SCORE: 67
ADLS_ACUITY_SCORE: 67
ADLS_ACUITY_SCORE: 63
ADLS_ACUITY_SCORE: 67
ADLS_ACUITY_SCORE: 63
ADLS_ACUITY_SCORE: 63
ADLS_ACUITY_SCORE: 67
ADLS_ACUITY_SCORE: 63
ADLS_ACUITY_SCORE: 67

## 2024-03-26 NOTE — PLAN OF CARE
DATE & TIME: 3/25/24 5927-3018    Cognitive Concerns/ Orientation : Alert, HECTOR orientation. Pt is nonverbal.    BEHAVIOR & AGGRESSION TOOL COLOR: Green   ABNL VS/O2: VSS on RA  MOBILITY: Assist x2/lift, fall risk. Reposition 2h.  PAIN MANAGMENT: No signs of pain  DIET: NPO. TF infusing at 55ml/hr with 160ml water flushes.  BOWEL/BLADDER: Incontinent of bowel and bladder. No BM this shift.  DRAIN/DEVICES: IV SL  SKIN: Blanchable redness to buttocks/coccyx, sacrum Mepilex in place, CDI. Scattered scabs and bruises. Rash to face.  D/C DATE: Discharge possibly today, pending blood cultures  OTHER IMPORTANT INFO: Contact isolation. Pt continues on IV Vancomycin. Lung sounds diminished with infrequent, productive cough. Pt wide awake until about 0400.
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
## 2024-03-26 NOTE — CONSULTS
Care Management Initial Consult    General Information  Assessment completed with: Parents, Karlos;;is (Mother)  Type of CM/SW Visit: Offer D/C Planning    Primary Care Provider verified and updated as needed: Yes   Readmission within the last 30 days: no previous admission in last 30 days      Reason for Consult: discharge planning  Advance Care Planning: Advance Care Planning Reviewed: present on chart          Communication Assessment  Patient's communication style: spoken language (English or Bilingual)    Hearing Difficulty or Deaf: no   Wear Glasses or Blind: no    Cognitive  Cognitive/Neuro/Behavioral: .WDL except  Level of Consciousness: alert  Arousal Level: opens eyes spontaneously  Orientation: other (see comments) (nonverbal, HECTOR)  Mood/Behavior: calm, cooperative  Best Language: 3 - Mute  Speech: unable to speak    Living Environment:   People in home: parent(s) (Mother)     Current living Arrangements: house      Able to return to prior arrangements: yes       Family/Social Support:  Care provided by: homecare agency, parent(s)  Provides care for: no one  Marital Status: Single  Parent(s)          Description of Support System: Involved    Support Assessment: Adequate family and caregiver support    Current Resources:   Patient receiving home care services: Yes  Skilled Home Care Services: Skilled Nursing  Community Resources: County Worker, Home Care  Equipment currently used at home: hospital bed, lift device, wheelchair, manual  Supplies currently used at home: Wound Care Supplies, Gloves, Nebulizer tubing    Employment/Financial:  Employment Status: disabled        Financial Concerns:             Does the patient's insurance plan have a 3 day qualifying hospital stay waiver?  No    Lifestyle & Psychosocial Needs:  Social Determinants of Health     Food Insecurity: Not on file   Depression: Not at risk (9/20/2023)    PHQ-2     PHQ-2 Score: 0   Housing Stability: Not on file   Tobacco Use: Medium Risk  (1/11/2024)    Patient History     Smoking Tobacco Use: Former     Smokeless Tobacco Use: Never     Passive Exposure: Not on file   Financial Resource Strain: Not on file   Alcohol Use: Not on file   Transportation Needs: Not on file   Physical Activity: Not on file   Interpersonal Safety: Not on file   Stress: Not on file   Social Connections: Not on file       Functional Status:  Prior to admission patient needed assistance:   Dependent ADLs:: Bathing, Dressing, Eating, Grooming, Incontinence, Positioning, Transfers, Wheelchair-with assist, Toileting  Dependent IADLs:: Meal Preparation, Medication Management, Transportation  Assesssment of Functional Status: At functional baseline    Mental Health Status:  Mental Health Status: No Current Concerns       Chemical Dependency Status:  Chemical Dependency Status: No Current Concerns             Values/Beliefs:  Spiritual, Cultural Beliefs, Advent Practices, Values that affect care: no               Additional Information:  Spoke with patients Mother Savannah who is patients Legal Guardian regarding discharge plans.    Patient receives total care from his mother and pt's [facility] in [facility].  Per Savannah, PCA is with patient during the night and is available during the day when needed.  Patient has a GJ tube feeding and medication administration via pt's J-tube and patient has a gastric bag over G-tube.  Savannah shared she is able to use pt's nidia lift with transfers for patient and pt has manual wheel chair. Savannah is unable to find a find a caregiver to fulfill the nursing 12 hrs per day, so Savannah took over over a year ago. Patient does have a sacral pressure injury and right heel pressure ulcer. Savannah reports patient is receiving nursing home care services with [facility] care now and receives supplies for tube feedings and chux from [facility].    Savannah is looking for a new bed for patient. Savannah stated she will contact patients casemanager regarding a  new mattress.  Plan for discharge today .       Anita Casillas RN [phone]

## 2024-03-27 ENCOUNTER — PATIENT OUTREACH (OUTPATIENT)
Dept: CARE COORDINATION | Facility: CLINIC | Age: 64
End: 2024-03-27
Payer: MEDICARE

## 2024-03-27 DIAGNOSIS — Z09 HOSPITAL DISCHARGE FOLLOW-UP: ICD-10-CM

## 2024-03-27 LAB
CARBAMAZEPINE EP SERPL-MCNC: 4.6 UG/ML
CARBAMAZEPINE SERPL-MCNC: 10.1 UG/ML
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
## 2024-03-28 LAB
BACTERIA BLD CULT: NO GROWTH
BACTERIA BLD CULT: NO GROWTH

## 2024-04-01 ENCOUNTER — VIRTUAL VISIT (OUTPATIENT)
Dept: FAMILY MEDICINE | Facility: CLINIC | Age: 64
End: 2024-04-01
Payer: MEDICARE

## 2024-04-01 DIAGNOSIS — S06.9X9S TRAUMATIC BRAIN INJURY WITH LOSS OF CONSCIOUSNESS, SEQUELA (H): ICD-10-CM

## 2024-04-01 DIAGNOSIS — J69.0 ASPIRATION PNEUMONITIS (H): ICD-10-CM

## 2024-04-01 DIAGNOSIS — J18.9 RECURRENT PNEUMONIA: ICD-10-CM

## 2024-04-01 DIAGNOSIS — I42.1 HYPERTROPHIC OBSTRUCTIVE CARDIOMYOPATHY (H): ICD-10-CM

## 2024-04-01 DIAGNOSIS — Z93.1 G TUBE FEEDINGS (H): ICD-10-CM

## 2024-04-01 DIAGNOSIS — L89.303 PRESSURE INJURY OF BUTTOCK, STAGE 3, UNSPECIFIED LATERALITY (H): Primary | ICD-10-CM

## 2024-04-01 DIAGNOSIS — G81.01 FLACCID HEMIPLEGIA AFFECTING RIGHT DOMINANT SIDE, UNSPECIFIED ETIOLOGY (H): ICD-10-CM

## 2024-04-01 PROCEDURE — 99495 TRANSJ CARE MGMT MOD F2F 14D: CPT | Mod: 95 | Performed by: INTERNAL MEDICINE

## 2024-04-01 RX ORDER — ALBUTEROL SULFATE 5 MG/ML
2.5 SOLUTION RESPIRATORY (INHALATION) EVERY 6 HOURS PRN
Qty: 240 ML | Refills: 3 | Status: ON HOLD | OUTPATIENT
Start: 2024-04-01 | End: 2024-07-27
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
## 2024-04-02 ENCOUNTER — TELEPHONE (OUTPATIENT)
Dept: FAMILY MEDICINE | Facility: CLINIC | Age: 64
End: 2024-04-02
Payer: MEDICARE

## 2024-04-02 NOTE — TELEPHONE ENCOUNTER
Reason for Call:  Other call back    Detailed comments: pt mom called wondering if they can get testing done for carbamazepine / tegretol and briviact testing. To check levels in blood. Please contact Savannah if able to do testing/lab work for above. Pt has lab apt this Friday for other labs and hoping to get them all done at the same time.    Phone Number Patient can be reached at: Home number on file [phone] (home)    Best Time: any    Can we leave a detailed message on this number? YES    Call taken on 4/2/2024 at 1:09 PM by Debi Epperson

## 2024-04-03 NOTE — TELEPHONE ENCOUNTER
I do not manage these labs, will not be able to advise how to change medication dose according to the level. This should be done by his neurologist.

## 2024-04-04 ENCOUNTER — VIRTUAL VISIT (OUTPATIENT)
Dept: PHARMACY | Facility: CLINIC | Age: 64
End: 2024-04-04
Attending: INTERNAL MEDICINE
Payer: MEDICAID

## 2024-04-04 DIAGNOSIS — E87.1 HYPONATREMIA: ICD-10-CM

## 2024-04-04 DIAGNOSIS — E23.0 PANHYPOPITUITARISM (H): ICD-10-CM

## 2024-04-04 DIAGNOSIS — K21.9 GASTROESOPHAGEAL REFLUX DISEASE, UNSPECIFIED WHETHER ESOPHAGITIS PRESENT: ICD-10-CM

## 2024-04-04 DIAGNOSIS — R52 INTERMITTENT PAIN: ICD-10-CM

## 2024-04-04 DIAGNOSIS — Z87.820 HISTORY OF TRAUMATIC BRAIN INJURY: ICD-10-CM

## 2024-04-04 DIAGNOSIS — E29.1 HYPOGONADISM MALE: ICD-10-CM

## 2024-04-04 DIAGNOSIS — Z78.9 TAKES DIETARY SUPPLEMENTS: ICD-10-CM

## 2024-04-04 DIAGNOSIS — G40.909 RECURRENT SEIZURES (H): ICD-10-CM

## 2024-04-04 DIAGNOSIS — J96.01 ACUTE RESPIRATORY FAILURE WITH HYPOXIA (H): Primary | ICD-10-CM

## 2024-04-04 DIAGNOSIS — R13.10 DYSPHAGIA, UNSPECIFIED TYPE: ICD-10-CM

## 2024-04-04 DIAGNOSIS — E03.9 HYPOTHYROIDISM, UNSPECIFIED TYPE: ICD-10-CM

## 2024-04-04 PROCEDURE — 99207 PR NO CHARGE LOS: CPT | Mod: 93 | Performed by: PHARMACIST

## 2024-04-04 NOTE — PROGRESS NOTES
"Medication Therapy Management (MTM) Encounter    ASSESSMENT:                            Medication Adherence/Access: No issues identified    Acute Respiratory Failure/Hyponatremia/Dysphagia: Improved. May benefit from follow-up/appointment with nutrition team to discuss tube feeds.    History of traumatic brain injury/Recurrent seizures: Stable. Plan in place with neurology.     GERD/Melena: Stable.     Hypothyroidism: Stable. Last TSH is below normal limits, but T4 is within normal limits.    Secondary adrenal insufficiency/Hypogonadism: Stable. Plan in place for follow-up with endocrinology.     Pain:  Stable.      Supplements: Stable.     PLAN:                            Consider future visit with nutrition to discuss Keyon's tube feedings.     Follow-up: As Needed    SUBJECTIVE/OBJECTIVE:                          Keyon Farias is a 63 year old male called for a follow-up visit. Joined on call today by mother, Savannah.     Reason for visit: Hospital Follow-Up.    Allergies/ADRs: Reviewed in chart  Past Medical History: Reviewed in chart  Tobacco: He reports that he quit smoking about 34 years ago. His smoking use included cigarettes. He has never used smokeless tobacco.  Alcohol: none    Medication Adherence/Access: No issues reported.  Medications are administered via G-tube by mother    Acute Respiratory Failure/Hyponatremia/Dysphagia: Savannah reports that he's doing well following discharge, no questions or major concerns for medications. Does wonder about nutrition as she likes the consistency of Boost better than Jevity.  Current medications include sodium bicarbonate 325 mg daily (though sometimes just Pink Himalayan salt) and does take potassium/sodium phosphates packet daily (they pay out of pocket for these - price was better this time)  He does have homecare. Keyon also receives albuterol nebs a few times a day. When he's \"rattly\" - Savannah uses scopolamine compounded capsules three times daily as " needed/glycopyrrolate and/or Mucinex.  No side effects reported.  Savannah reports Keyon's breathing has been stable. For G-tube care, Savannah uses bacitracin, mupirocin, or hydrocortisone as needed.  She reports this is effective.    Lab Results   Component Value Date     03/25/2024    PHOS 2.7 03/25/2024       History of traumatic brain injury/Recurrent seizures: Patient is taking carbamazepine 150 mg three times daily (0600, 1200, 2400)/carbamazepine 100 mg once daily at 1800 and brivaracetam 100 mg twice daily at 0900/2100. Working to get refill of Briviact from neurology. Denies any recent episodes.     GERD/Melena: Current medications include: Protonix (pantoprazole) 40 mg once daily and metoclopramide three-four times daily (although prescribed for four times daily). Savannah reports no current symptoms and indicates current regimen is effective.    Hypothyroidism    Levothyroxine 112 mcg daily.   Patient is having the following symptoms: none.      TSH   Date Value Ref Range Status   01/29/2024 <0.01 (L) 0.30 - 4.20 uIU/mL Final   05/09/2022 <0.01 (L) 0.40 - 4.00 mU/L Final   07/05/2018 <0.01 (L) 0.40 - 4.00 mU/L Final     T4 Free   Date Value Ref Range Status   04/22/2021 1.40 0.76 - 1.46 ng/dL Final     Free T4   Date Value Ref Range Status   01/11/2024 1.25 0.90 - 1.70 ng/dL Final       Secondary adrenal insufficiency/Hypogonadism: Patient is receiving hydrocortisone 15 mg every morning/7.5 mg every afternoon and testosterone cypionate 60 mg every 7 days. Follows with endocrinology. Denies any known issues.      Pain:  Patient has acetaminophen available for use, but is not currently needing to use.  No side effects reported when needed.     Supplements: Patient is receive vitamin C 1,000 mg daily, vitamin D3 2,000 units daily, vitamin B12 daily, a daily multivitamin, and calcium magnesium zinc daily. Denies any current issues.     Today's Vitals: There were no vitals taken for this visit.    BP  Readings from Last 3 Encounters:   03/26/24 123/65   01/29/24 94/65   01/26/24 109/49     Wt Readings from Last 5 Encounters:   03/25/24 151 lb 14.4 oz (68.9 kg)   01/26/24 150 lb 2.1 oz (68.1 kg)   01/11/24 151 lb (68.5 kg)   12/30/23 151 lb 0.2 oz (68.5 kg)   12/21/23 166 lb 0.1 oz (75.3 kg)   ----------------  Post Discharge Medication Reconciliation Status: discharge medications reconciled, continue medications without change.    I spent 25 minutes with this patient today. A copy of the visit note was provided to the patient's provider(s).    A summary of these recommendations was mailed to the patient.    Naveed Ortiz, PharmD, BCACP  Medication Therapy Management Pharmacist  Voicemail: [phone]      Telemedicine Visit Details  Type of service:  Telephone visit  Start Time:  3:00 PM  End Time:  3:25 PM     Medication Therapy Recommendations  No medication therapy recommendations to display

## 2024-04-04 NOTE — PATIENT INSTRUCTIONS
"Recommendations from today's MTM visit:                                                    MTM (medication therapy management) is a service provided by a clinical pharmacist designed to help you get the most of out of your medicines.      Consider future visit with nutrition to discuss Keyon's tube feedings.     Follow-up: As Needed    It was great speaking with you today.  I value your experience and would be very thankful for your time in providing feedback in our clinic survey. In the next few days, you may receive an email or text message from [employer] with a link to a survey related to your  clinical pharmacist.\"     To schedule another MTM appointment, please call the clinic directly or you may call the MTM scheduling line at [phone] or toll-free at [phone].     My Clinical Pharmacist's contact information:                                                      Please feel free to contact me with any questions or concerns you have.      Naveed Ortiz, PharmD, [facility]  Medication Therapy Management Pharmacist  Voicemail: [phone]  "

## 2024-04-05 ENCOUNTER — LAB (OUTPATIENT)
Dept: LAB | Facility: CLINIC | Age: 64
End: 2024-04-05
Payer: MEDICARE

## 2024-04-05 ENCOUNTER — OFFICE VISIT (OUTPATIENT)
Dept: CARDIOLOGY | Facility: CLINIC | Age: 64
End: 2024-04-05
Attending: PHYSICIAN ASSISTANT
Payer: MEDICARE

## 2024-04-05 VITALS
HEART RATE: 69 BPM | DIASTOLIC BLOOD PRESSURE: 55 MMHG | OXYGEN SATURATION: 98 % | SYSTOLIC BLOOD PRESSURE: 89 MMHG | HEIGHT: 70 IN | BODY MASS INDEX: 21.79 KG/M2

## 2024-04-05 DIAGNOSIS — E60 LOW ZINC LEVEL: ICD-10-CM

## 2024-04-05 DIAGNOSIS — Q24.8 LEFT VENTRICULAR OUTFLOW TRACT OBSTRUCTION: ICD-10-CM

## 2024-04-05 DIAGNOSIS — R63.39 OTHER FEEDING DIFFICULTIES: ICD-10-CM

## 2024-04-05 DIAGNOSIS — R40.0 DAYTIME SOMNOLENCE: Primary | ICD-10-CM

## 2024-04-05 DIAGNOSIS — I42.1 HYPERTROPHIC OBSTRUCTIVE CARDIOMYOPATHY (H): ICD-10-CM

## 2024-04-05 PROCEDURE — 82525 ASSAY OF COPPER: CPT | Mod: 90

## 2024-04-05 PROCEDURE — 36415 COLL VENOUS BLD VENIPUNCTURE: CPT

## 2024-04-05 PROCEDURE — 84630 ASSAY OF ZINC: CPT | Mod: 90

## 2024-04-05 PROCEDURE — 99000 SPECIMEN HANDLING OFFICE-LAB: CPT

## 2024-04-05 PROCEDURE — 99207 PR NO CHARGE LOS: CPT | Performed by: INTERNAL MEDICINE

## 2024-04-05 NOTE — LETTER
4/5/2024    Elsa Queen MD  [address] [address]    RE: Keyon Farias       Dear Colleague,     I had the pleasure of seeing Keyon Farias in the [facility].  No charge office visit.  Patient was post-hospital for pneumonia.    Thank you for allowing me to participate in the care of your patient.      Sincerely,     Ann-Marie Young [facility]  cc:   Moni Marin PA-C  [address] [address]

## 2024-04-05 NOTE — LETTER
April 8, 2024      Keyon Farias  [address] [place] [address]        Dear ,    We are writing to inform you of your test results.    The zinc and copper levels look great.     Resulted Orders   Zinc   Result Value Ref Range    Zinc, Serum/Plasma 79.6 60.0 - 120.0 ug/dL      Comment:      INTERPRETIVE INFORMATION: Zinc, Serum or Plasma    Elevated results may be due to skin or collection-related   contamination, including the use of a noncertified   metal-free collection/transport tube. If contamination   concerns exist due to elevated levels of serum/plasma zinc,   confirmation with a second specimen collected in a   certified metal-free tube is recommended.    Circulating zinc concentrations are dependent on albumin   status and are depressed with malnutrition.  Zinc may also   be lowered with infection, inflammation, stress, oral   contraceptives, and pregnancy.  Zinc may be elevated with   zinc supplementation or fasting.  Elevated zinc   concentrations may interfere with copper absorption.     This test was developed and its performance characteristics   determined by [email or web address]. It has not been cleared or   approved by the US Food and Drug Administration. This test   was performed in a CLIA certified laboratory and is   intended for clinical  purposes.  Performed By: [email or web address]  [address] [place], [place] [id number]  : Israel Harding MD, PhD  CLIA Number: [id number]   Copper level   Result Value Ref Range    Copper 138.3 70.0 - 140.0 ug/dL      Comment:      INTERPRETIVE INFORMATION: Copper, Serum or Plasma    Elevated results may be due to skin or collection-related   contamination, including the use of a noncertified   metal-free collection/transport tube. If contamination   concerns exist due to elevated levels of serum/plasma   copper, confirmation with a second specimen collected in a   certified metal-free tube is recommended.    Serum copper may  be elevated with infection, inflammation,   stress, and copper supplementation. In females, elevated   copper may also be caused by oral contraceptives and   pregnancy (concentrations may be elevated up to 3 times   normal during the third trimester).    This test was developed and its performance characteristics   determined by [facility]. It has not been cleared or   approved by the US Food and Drug Administration. This test   was performed in a CLIA certified laboratory and is   intended for clinical purposes.  Performed By: [facility]  [address] [place] [address]  [id number]  : Israel Harding MD, PhD  CLIA Number: [id number]       If you have any questions or concerns, please call the clinic at the number listed above.       Sincerely,      NATHANIEL Ruff CNP

## 2024-04-07 LAB
COPPER SERPL-MCNC: 138.3 UG/DL
ZINC SERPL-MCNC: 79.6 UG/DL

## 2024-04-17 ENCOUNTER — APPOINTMENT (OUTPATIENT)
Dept: CT IMAGING | Facility: CLINIC | Age: 64
DRG: 177 | End: 2024-04-17
Attending: EMERGENCY MEDICINE
Payer: MEDICARE

## 2024-04-17 ENCOUNTER — HOSPITAL ENCOUNTER (INPATIENT)
Facility: CLINIC | Age: 64
LOS: 3 days | Discharge: HOME | DRG: 177 | End: 2024-04-20
Attending: EMERGENCY MEDICINE | Admitting: HOSPITALIST
Payer: MEDICARE

## 2024-04-17 ENCOUNTER — APPOINTMENT (OUTPATIENT)
Dept: GENERAL RADIOLOGY | Facility: CLINIC | Age: 64
DRG: 177 | End: 2024-04-17
Attending: EMERGENCY MEDICINE
Payer: MEDICARE

## 2024-04-17 DIAGNOSIS — J18.9 COMMUNITY ACQUIRED PNEUMONIA OF RIGHT LOWER LOBE OF LUNG: Primary | ICD-10-CM

## 2024-04-17 DIAGNOSIS — R41.82 ALTERED MENTAL STATUS, UNSPECIFIED ALTERED MENTAL STATUS TYPE: ICD-10-CM

## 2024-04-17 DIAGNOSIS — J69.0 ASPIRATION PNEUMONIA OF RIGHT MIDDLE LOBE, UNSPECIFIED ASPIRATION PNEUMONIA TYPE (H): ICD-10-CM

## 2024-04-17 LAB
ALBUMIN SERPL BCG-MCNC: 4.2 G/DL (ref 3.5–5.2)
ALBUMIN UR-MCNC: 30 MG/DL
ALP SERPL-CCNC: 100 U/L (ref 40–150)
ALT SERPL W P-5'-P-CCNC: 22 U/L (ref 0–70)
ANION GAP SERPL CALCULATED.3IONS-SCNC: 10 MMOL/L (ref 7–15)
APPEARANCE UR: CLEAR
AST SERPL W P-5'-P-CCNC: 27 U/L (ref 0–45)
BASE EXCESS BLDV CALC-SCNC: 10 MMOL/L (ref -3–3)
BASOPHILS # BLD AUTO: 0.1 10E3/UL (ref 0–0.2)
BASOPHILS NFR BLD AUTO: 1 %
BILIRUB DIRECT SERPL-MCNC: <0.2 MG/DL (ref 0–0.3)
BILIRUB SERPL-MCNC: 0.3 MG/DL
BILIRUB UR QL STRIP: NEGATIVE
BUN SERPL-MCNC: 23.1 MG/DL (ref 8–23)
CALCIUM SERPL-MCNC: 9.6 MG/DL (ref 8.8–10.2)
CHLORIDE SERPL-SCNC: 92 MMOL/L (ref 98–107)
COLOR UR AUTO: YELLOW
CREAT SERPL-MCNC: 1.16 MG/DL (ref 0.67–1.17)
DEPRECATED HCO3 PLAS-SCNC: 33 MMOL/L (ref 22–29)
EGFRCR SERPLBLD CKD-EPI 2021: 72 ML/MIN/1.73M2
EOSINOPHIL # BLD AUTO: 0.5 10E3/UL (ref 0–0.7)
EOSINOPHIL NFR BLD AUTO: 4 %
ERYTHROCYTE [DISTWIDTH] IN BLOOD BY AUTOMATED COUNT: 14.6 % (ref 10–15)
FLUAV RNA SPEC QL NAA+PROBE: NEGATIVE
FLUBV RNA RESP QL NAA+PROBE: NEGATIVE
GLUCOSE BLDC GLUCOMTR-MCNC: 127 MG/DL (ref 70–99)
GLUCOSE SERPL-MCNC: 119 MG/DL (ref 70–99)
GLUCOSE UR STRIP-MCNC: NEGATIVE MG/DL
HCO3 BLDV-SCNC: 36 MMOL/L (ref 21–28)
HCT VFR BLD AUTO: 50.4 % (ref 40–53)
HGB BLD-MCNC: 16.1 G/DL (ref 13.3–17.7)
HGB UR QL STRIP: NEGATIVE
HOLD SPECIMEN: NORMAL
HOLD SPECIMEN: NORMAL
HYALINE CASTS: 6 /LPF
IMM GRANULOCYTES # BLD: 0 10E3/UL
IMM GRANULOCYTES NFR BLD: 0 %
KETONES UR STRIP-MCNC: NEGATIVE MG/DL
LACTATE BLD-SCNC: 1.7 MMOL/L
LEUKOCYTE ESTERASE UR QL STRIP: NEGATIVE
LYMPHOCYTES # BLD AUTO: 2.9 10E3/UL (ref 0.8–5.3)
LYMPHOCYTES NFR BLD AUTO: 26 %
MCH RBC QN AUTO: 26.7 PG (ref 26.5–33)
MCHC RBC AUTO-ENTMCNC: 31.9 G/DL (ref 31.5–36.5)
MCV RBC AUTO: 84 FL (ref 78–100)
MONOCYTES # BLD AUTO: 1.3 10E3/UL (ref 0–1.3)
MONOCYTES NFR BLD AUTO: 12 %
NEUTROPHILS # BLD AUTO: 6.5 10E3/UL (ref 1.6–8.3)
NEUTROPHILS NFR BLD AUTO: 57 %
NITRATE UR QL: NEGATIVE
NRBC # BLD AUTO: 0 10E3/UL
NRBC BLD AUTO-RTO: 0 /100
PCO2 BLDV: 51 MM HG (ref 40–50)
PH BLDV: 7.46 [PH] (ref 7.32–7.43)
PH UR STRIP: 8 [PH] (ref 5–7)
PLATELET # BLD AUTO: 222 10E3/UL (ref 150–450)
PO2 BLDV: 24 MM HG (ref 25–47)
POTASSIUM SERPL-SCNC: 4.1 MMOL/L (ref 3.4–5.3)
PROT SERPL-MCNC: 8.6 G/DL (ref 6.4–8.3)
RBC # BLD AUTO: 6.02 10E6/UL (ref 4.4–5.9)
RBC URINE: 5 /HPF
RSV RNA SPEC NAA+PROBE: NEGATIVE
SAO2 % BLDV: 44 % (ref 70–75)
SARS-COV-2 RNA RESP QL NAA+PROBE: NEGATIVE
SODIUM SERPL-SCNC: 135 MMOL/L (ref 135–145)
SP GR UR STRIP: 1.03 (ref 1–1.03)
UROBILINOGEN UR STRIP-MCNC: 2 MG/DL
WBC # BLD AUTO: 11.4 10E3/UL (ref 4–11)
WBC URINE: 3 /HPF

## 2024-04-17 PROCEDURE — 82803 BLOOD GASES ANY COMBINATION: CPT

## 2024-04-17 PROCEDURE — 71046 X-RAY EXAM CHEST 2 VIEWS: CPT

## 2024-04-17 PROCEDURE — 120N000001 HC R&B MED SURG/OB

## 2024-04-17 PROCEDURE — 70450 CT HEAD/BRAIN W/O DYE: CPT | Mod: MG

## 2024-04-17 PROCEDURE — 87186 SC STD MICRODIL/AGAR DIL: CPT | Performed by: EMERGENCY MEDICINE

## 2024-04-17 PROCEDURE — 36415 COLL VENOUS BLD VENIPUNCTURE: CPT | Performed by: EMERGENCY MEDICINE

## 2024-04-17 PROCEDURE — 99291 CRITICAL CARE FIRST HOUR: CPT | Mod: 25

## 2024-04-17 PROCEDURE — 81001 URINALYSIS AUTO W/SCOPE: CPT | Performed by: EMERGENCY MEDICINE

## 2024-04-17 PROCEDURE — 82248 BILIRUBIN DIRECT: CPT | Performed by: EMERGENCY MEDICINE

## 2024-04-17 PROCEDURE — 82962 GLUCOSE BLOOD TEST: CPT

## 2024-04-17 PROCEDURE — 999N000157 HC STATISTIC RCP TIME EA 10 MIN

## 2024-04-17 PROCEDURE — 250N000011 HC RX IP 250 OP 636: Performed by: EMERGENCY MEDICINE

## 2024-04-17 PROCEDURE — 87149 DNA/RNA DIRECT PROBE: CPT | Performed by: EMERGENCY MEDICINE

## 2024-04-17 PROCEDURE — 85025 COMPLETE CBC W/AUTO DIFF WBC: CPT | Performed by: EMERGENCY MEDICINE

## 2024-04-17 PROCEDURE — 87637 SARSCOV2&INF A&B&RSV AMP PRB: CPT | Performed by: EMERGENCY MEDICINE

## 2024-04-17 PROCEDURE — 99292 CRITICAL CARE ADDL 30 MIN: CPT

## 2024-04-17 PROCEDURE — 80048 BASIC METABOLIC PNL TOTAL CA: CPT | Performed by: EMERGENCY MEDICINE

## 2024-04-17 PROCEDURE — 99223 1ST HOSP IP/OBS HIGH 75: CPT | Mod: AI | Performed by: HOSPITALIST

## 2024-04-17 RX ORDER — PIPERACILLIN SODIUM, TAZOBACTAM SODIUM 4; .5 G/20ML; G/20ML
4.5 INJECTION, POWDER, LYOPHILIZED, FOR SOLUTION INTRAVENOUS ONCE
Status: COMPLETED | OUTPATIENT
Start: 2024-04-17 | End: 2024-04-18

## 2024-04-17 RX ORDER — CARBAMAZEPINE 100 MG/5ML
150 SUSPENSION ORAL ONCE
Status: COMPLETED | OUTPATIENT
Start: 2024-04-17 | End: 2024-04-18

## 2024-04-17 RX ORDER — CARBAMAZEPINE 100 MG/5ML
150 SUSPENSION ORAL ONCE
Status: DISCONTINUED | OUTPATIENT
Start: 2024-04-17 | End: 2024-04-17

## 2024-04-17 RX ADMIN — PIPERACILLIN AND TAZOBACTAM 4.5 G: 4; .5 INJECTION, POWDER, FOR SOLUTION INTRAVENOUS at 23:58

## 2024-04-17 ASSESSMENT — ACTIVITIES OF DAILY LIVING (ADL)
ADLS_ACUITY_SCORE: 41

## 2024-04-17 ASSESSMENT — ENCOUNTER SYMPTOMS
COUGH: 1
VOMITING: 0

## 2024-04-18 ENCOUNTER — MEDICAL CORRESPONDENCE (OUTPATIENT)
Dept: HEALTH INFORMATION MANAGEMENT | Facility: CLINIC | Age: 64
End: 2024-04-18

## 2024-04-18 LAB
ANION GAP SERPL CALCULATED.3IONS-SCNC: 10 MMOL/L (ref 7–15)
BUN SERPL-MCNC: 20.2 MG/DL (ref 8–23)
CALCIUM SERPL-MCNC: 8.4 MG/DL (ref 8.8–10.2)
CHLORIDE SERPL-SCNC: 97 MMOL/L (ref 98–107)
CREAT SERPL-MCNC: 1.18 MG/DL (ref 0.67–1.17)
DEPRECATED HCO3 PLAS-SCNC: 31 MMOL/L (ref 22–29)
EGFRCR SERPLBLD CKD-EPI 2021: 70 ML/MIN/1.73M2
ENTEROCOCCUS FAECALIS: NOT DETECTED
ENTEROCOCCUS FAECIUM: NOT DETECTED
ERYTHROCYTE [DISTWIDTH] IN BLOOD BY AUTOMATED COUNT: 14.8 % (ref 10–15)
GLUCOSE BLDC GLUCOMTR-MCNC: 92 MG/DL (ref 70–99)
GLUCOSE SERPL-MCNC: 93 MG/DL (ref 70–99)
HCT VFR BLD AUTO: 43 % (ref 40–53)
HGB BLD-MCNC: 13.8 G/DL (ref 13.3–17.7)
LISTERIA SPECIES (DETECTED/NOT DETECTED): NOT DETECTED
MCH RBC QN AUTO: 27.2 PG (ref 26.5–33)
MCHC RBC AUTO-ENTMCNC: 32.1 G/DL (ref 31.5–36.5)
MCV RBC AUTO: 85 FL (ref 78–100)
PLATELET # BLD AUTO: 188 10E3/UL (ref 150–450)
POTASSIUM SERPL-SCNC: 3.8 MMOL/L (ref 3.4–5.3)
RBC # BLD AUTO: 5.08 10E6/UL (ref 4.4–5.9)
SODIUM SERPL-SCNC: 138 MMOL/L (ref 135–145)
STAPHYLOCOCCUS AUREUS: NOT DETECTED
STAPHYLOCOCCUS EPIDERMIDIS: NOT DETECTED
STAPHYLOCOCCUS LUGDUNENSIS: NOT DETECTED
STAPHYLOCOCCUS SPECIES: DETECTED
STREPTOCOCCUS AGALACTIAE: NOT DETECTED
STREPTOCOCCUS ANGINOSUS GROUP: NOT DETECTED
STREPTOCOCCUS PNEUMONIAE: NOT DETECTED
STREPTOCOCCUS PYOGENES: NOT DETECTED
STREPTOCOCCUS SPECIES: NOT DETECTED
WBC # BLD AUTO: 8.6 10E3/UL (ref 4–11)

## 2024-04-18 PROCEDURE — 80048 BASIC METABOLIC PNL TOTAL CA: CPT | Performed by: HOSPITALIST

## 2024-04-18 PROCEDURE — 250N000013 HC RX MED GY IP 250 OP 250 PS 637: Performed by: INTERNAL MEDICINE

## 2024-04-18 PROCEDURE — 258N000003 HC RX IP 258 OP 636: Performed by: HOSPITALIST

## 2024-04-18 PROCEDURE — 250N000013 HC RX MED GY IP 250 OP 250 PS 637: Performed by: EMERGENCY MEDICINE

## 2024-04-18 PROCEDURE — 120N000001 HC R&B MED SURG/OB

## 2024-04-18 PROCEDURE — 250N000011 HC RX IP 250 OP 636: Performed by: HOSPITALIST

## 2024-04-18 PROCEDURE — 82962 GLUCOSE BLOOD TEST: CPT

## 2024-04-18 PROCEDURE — 85018 HEMOGLOBIN: CPT | Performed by: HOSPITALIST

## 2024-04-18 PROCEDURE — 250N000013 HC RX MED GY IP 250 OP 250 PS 637: Performed by: HOSPITALIST

## 2024-04-18 PROCEDURE — 36415 COLL VENOUS BLD VENIPUNCTURE: CPT | Performed by: HOSPITALIST

## 2024-04-18 PROCEDURE — 99233 SBSQ HOSP IP/OBS HIGH 50: CPT | Performed by: INTERNAL MEDICINE

## 2024-04-18 RX ORDER — ACETAMINOPHEN 325 MG/1
650 TABLET ORAL EVERY 6 HOURS PRN
Status: DISCONTINUED | OUTPATIENT
Start: 2024-04-18 | End: 2024-04-20 | Stop reason: HOSPADM

## 2024-04-18 RX ORDER — ENOXAPARIN SODIUM 100 MG/ML
40 INJECTION SUBCUTANEOUS EVERY 24 HOURS
Status: DISCONTINUED | OUTPATIENT
Start: 2024-04-18 | End: 2024-04-20 | Stop reason: HOSPADM

## 2024-04-18 RX ORDER — NALOXONE HYDROCHLORIDE 0.4 MG/ML
0.4 INJECTION, SOLUTION INTRAMUSCULAR; INTRAVENOUS; SUBCUTANEOUS
Status: DISCONTINUED | OUTPATIENT
Start: 2024-04-18 | End: 2024-04-20 | Stop reason: HOSPADM

## 2024-04-18 RX ORDER — ALBUTEROL SULFATE 5 MG/ML
2.5 SOLUTION RESPIRATORY (INHALATION) EVERY 6 HOURS PRN
Status: DISCONTINUED | OUTPATIENT
Start: 2024-04-18 | End: 2024-04-20 | Stop reason: HOSPADM

## 2024-04-18 RX ORDER — LIDOCAINE 40 MG/G
CREAM TOPICAL
Status: DISCONTINUED | OUTPATIENT
Start: 2024-04-18 | End: 2024-04-20 | Stop reason: HOSPADM

## 2024-04-18 RX ORDER — ACETAMINOPHEN 650 MG/1
650 SUPPOSITORY RECTAL EVERY 4 HOURS PRN
Status: DISCONTINUED | OUTPATIENT
Start: 2024-04-18 | End: 2024-04-20 | Stop reason: HOSPADM

## 2024-04-18 RX ORDER — AMOXICILLIN 250 MG
2 CAPSULE ORAL 2 TIMES DAILY PRN
Status: DISCONTINUED | OUTPATIENT
Start: 2024-04-18 | End: 2024-04-20 | Stop reason: HOSPADM

## 2024-04-18 RX ORDER — AMOXICILLIN 250 MG
1 CAPSULE ORAL 2 TIMES DAILY PRN
Status: DISCONTINUED | OUTPATIENT
Start: 2024-04-18 | End: 2024-04-20 | Stop reason: HOSPADM

## 2024-04-18 RX ORDER — DEXTROSE MONOHYDRATE 100 MG/ML
INJECTION, SOLUTION INTRAVENOUS CONTINUOUS PRN
Status: DISCONTINUED | OUTPATIENT
Start: 2024-04-18 | End: 2024-04-20 | Stop reason: HOSPADM

## 2024-04-18 RX ORDER — NALOXONE HYDROCHLORIDE 0.4 MG/ML
0.2 INJECTION, SOLUTION INTRAMUSCULAR; INTRAVENOUS; SUBCUTANEOUS
Status: DISCONTINUED | OUTPATIENT
Start: 2024-04-18 | End: 2024-04-20 | Stop reason: HOSPADM

## 2024-04-18 RX ORDER — SODIUM BICARBONATE 325 MG/1
325 TABLET ORAL DAILY
Status: DISCONTINUED | OUTPATIENT
Start: 2024-04-18 | End: 2024-04-18

## 2024-04-18 RX ORDER — LEVOTHYROXINE SODIUM 112 UG/1
112 TABLET ORAL DAILY
Status: DISCONTINUED | OUTPATIENT
Start: 2024-04-18 | End: 2024-04-20 | Stop reason: HOSPADM

## 2024-04-18 RX ORDER — GLYCOPYRROLATE 1 MG/1
1 TABLET ORAL 3 TIMES DAILY PRN
Status: DISCONTINUED | OUTPATIENT
Start: 2024-04-18 | End: 2024-04-20 | Stop reason: HOSPADM

## 2024-04-18 RX ORDER — CARBAMAZEPINE 100 MG/5ML
150 SUSPENSION ORAL 3 TIMES DAILY
Status: DISCONTINUED | OUTPATIENT
Start: 2024-04-18 | End: 2024-04-20 | Stop reason: HOSPADM

## 2024-04-18 RX ORDER — ONDANSETRON 2 MG/ML
4 INJECTION INTRAMUSCULAR; INTRAVENOUS EVERY 6 HOURS PRN
Status: DISCONTINUED | OUTPATIENT
Start: 2024-04-18 | End: 2024-04-20 | Stop reason: HOSPADM

## 2024-04-18 RX ORDER — PIPERACILLIN SODIUM, TAZOBACTAM SODIUM 3; .375 G/15ML; G/15ML
3.38 INJECTION, POWDER, LYOPHILIZED, FOR SOLUTION INTRAVENOUS EVERY 6 HOURS
Status: DISCONTINUED | OUTPATIENT
Start: 2024-04-18 | End: 2024-04-20 | Stop reason: HOSPADM

## 2024-04-18 RX ORDER — METOCLOPRAMIDE HYDROCHLORIDE 5 MG/5ML
10 SOLUTION ORAL
Status: DISCONTINUED | OUTPATIENT
Start: 2024-04-18 | End: 2024-04-20 | Stop reason: HOSPADM

## 2024-04-18 RX ORDER — CALCIUM CARBONATE 500 MG/1
1000 TABLET, CHEWABLE ORAL 4 TIMES DAILY PRN
Status: DISCONTINUED | OUTPATIENT
Start: 2024-04-18 | End: 2024-04-20 | Stop reason: HOSPADM

## 2024-04-18 RX ORDER — GUAIFENESIN 600 MG/1
600 TABLET, EXTENDED RELEASE ORAL 2 TIMES DAILY PRN
Status: DISCONTINUED | OUTPATIENT
Start: 2024-04-18 | End: 2024-04-20 | Stop reason: HOSPADM

## 2024-04-18 RX ORDER — CARBAMAZEPINE 100 MG/5ML
100 SUSPENSION ORAL DAILY
Status: DISCONTINUED | OUTPATIENT
Start: 2024-04-18 | End: 2024-04-20 | Stop reason: HOSPADM

## 2024-04-18 RX ORDER — ONDANSETRON 4 MG/1
4 TABLET, ORALLY DISINTEGRATING ORAL EVERY 6 HOURS PRN
Status: DISCONTINUED | OUTPATIENT
Start: 2024-04-18 | End: 2024-04-20 | Stop reason: HOSPADM

## 2024-04-18 RX ORDER — HYDROMORPHONE HCL IN WATER/PF 6 MG/30 ML
0.2 PATIENT CONTROLLED ANALGESIA SYRINGE INTRAVENOUS
Status: DISCONTINUED | OUTPATIENT
Start: 2024-04-18 | End: 2024-04-20 | Stop reason: HOSPADM

## 2024-04-18 RX ORDER — SODIUM CHLORIDE 9 MG/ML
INJECTION, SOLUTION INTRAVENOUS CONTINUOUS
Status: DISCONTINUED | OUTPATIENT
Start: 2024-04-18 | End: 2024-04-20 | Stop reason: HOSPADM

## 2024-04-18 RX ORDER — ACETAMINOPHEN 325 MG/1
650 TABLET ORAL EVERY 4 HOURS PRN
Status: DISCONTINUED | OUTPATIENT
Start: 2024-04-18 | End: 2024-04-20 | Stop reason: HOSPADM

## 2024-04-18 RX ORDER — HYDROMORPHONE HCL IN WATER/PF 6 MG/30 ML
0.4 PATIENT CONTROLLED ANALGESIA SYRINGE INTRAVENOUS
Status: DISCONTINUED | OUTPATIENT
Start: 2024-04-18 | End: 2024-04-20 | Stop reason: HOSPADM

## 2024-04-18 RX ADMIN — METOCLOPRAMIDE HYDROCHLORIDE 10 MG: 5 SOLUTION ORAL at 20:45

## 2024-04-18 RX ADMIN — CARBAMAZEPINE 100 MG: 100 SUSPENSION ORAL at 16:38

## 2024-04-18 RX ADMIN — POTASSIUM & SODIUM PHOSPHATES POWDER PACK 280-160-250 MG 1 PACKET: 280-160-250 PACK at 16:38

## 2024-04-18 RX ADMIN — PIPERACILLIN AND TAZOBACTAM 3.38 G: 3; .375 INJECTION, POWDER, FOR SOLUTION INTRAVENOUS at 18:02

## 2024-04-18 RX ADMIN — HYDROCORTISONE 15 MG: 10 TABLET ORAL at 09:40

## 2024-04-18 RX ADMIN — LEVOTHYROXINE SODIUM 112 MCG: 112 TABLET ORAL at 16:37

## 2024-04-18 RX ADMIN — SODIUM CHLORIDE: 9 INJECTION, SOLUTION INTRAVENOUS at 21:54

## 2024-04-18 RX ADMIN — CARBAMAZEPINE 150 MG: 100 SUSPENSION ORAL at 09:29

## 2024-04-18 RX ADMIN — ACETAMINOPHEN 650 MG: 325 TABLET, FILM COATED ORAL at 09:58

## 2024-04-18 RX ADMIN — BRIVARACETAM 100 MG: 10 SOLUTION ORAL at 09:29

## 2024-04-18 RX ADMIN — PIPERACILLIN AND TAZOBACTAM 3.38 G: 3; .375 INJECTION, POWDER, FOR SOLUTION INTRAVENOUS at 15:00

## 2024-04-18 RX ADMIN — SODIUM CHLORIDE: 9 INJECTION, SOLUTION INTRAVENOUS at 02:17

## 2024-04-18 RX ADMIN — HYDROMORPHONE HYDROCHLORIDE 0.2 MG: 0.2 INJECTION, SOLUTION INTRAMUSCULAR; INTRAVENOUS; SUBCUTANEOUS at 01:14

## 2024-04-18 RX ADMIN — METOCLOPRAMIDE HYDROCHLORIDE 10 MG: 5 SOLUTION ORAL at 16:38

## 2024-04-18 RX ADMIN — ENOXAPARIN SODIUM 40 MG: 40 INJECTION SUBCUTANEOUS at 10:05

## 2024-04-18 RX ADMIN — SODIUM CHLORIDE: 9 INJECTION, SOLUTION INTRAVENOUS at 11:48

## 2024-04-18 RX ADMIN — MELATONIN 5 MG TABLET 5 MG: at 20:45

## 2024-04-18 RX ADMIN — CARBAMAZEPINE 150 MG: 100 SUSPENSION ORAL at 00:16

## 2024-04-18 RX ADMIN — BRIVARACETAM 100 MG: 10 SOLUTION ORAL at 00:01

## 2024-04-18 RX ADMIN — BRIVARACETAM 100 MG: 10 SOLUTION ORAL at 20:45

## 2024-04-18 RX ADMIN — HYDROCORTISONE 7.5 MG: 5 TABLET ORAL at 16:37

## 2024-04-18 RX ADMIN — PIPERACILLIN AND TAZOBACTAM 3.38 G: 3; .375 INJECTION, POWDER, FOR SOLUTION INTRAVENOUS at 05:51

## 2024-04-18 ASSESSMENT — ACTIVITIES OF DAILY LIVING (ADL)
ADLS_ACUITY_SCORE: 41
ADLS_ACUITY_SCORE: 59
ADLS_ACUITY_SCORE: 41
ADLS_ACUITY_SCORE: 67
ADLS_ACUITY_SCORE: 41
ADLS_ACUITY_SCORE: 67
ADLS_ACUITY_SCORE: 41
ADLS_ACUITY_SCORE: 67
ADLS_ACUITY_SCORE: 41
ADLS_ACUITY_SCORE: 41
ADLS_ACUITY_SCORE: 59
ADLS_ACUITY_SCORE: 41
ADLS_ACUITY_SCORE: 59
ADLS_ACUITY_SCORE: 41
ADLS_ACUITY_SCORE: 59
ADLS_ACUITY_SCORE: 59

## 2024-04-18 NOTE — ED NOTES
Patient was groaning, had elevated HR and appeared to be in pain.  Dilaudid given.  He was repositioned in the bed and pillows used to prop up his legs.  His sacrum and scrotum are very red.  Barrier cream and meplex used.  He was also set up with a purwik.

## 2024-04-18 NOTE — ED NOTES
[facility]  ED Nurse Handoff Report    ED Chief complaint: Altered Mental Status      ED Diagnosis:   Final diagnoses:   Community acquired pneumonia of right lower lobe of lung   Altered mental status, unspecified altered mental status type       Code Status: Admitting MD to discuss    Allergies:   Allergies   Allergen Reactions    Phenytoin Sodium Other (See Comments)     Shaking violently   Tremors    Valproic Acid Other (See Comments)     Toxicity w/ bone marrow suspension, elevated ammonia levels     Scopolamine Hives     Hives with the patch - oral no problem    Vancomycin Other (See Comments)     Vancomycin flushing syndrome - pt tolerated dose at half rate.       Patient Story: Pt arrives via EMS. He is non verbal and non ambulatory from a TBI. Mother states he has been sleeping all day and this is abnormal for him.     Focused Assessment:  A & Ox4. Baseline cognition.     Labs Ordered and Resulted from Time of ED Arrival to Time of ED Departure   GLUCOSE BY METER - Abnormal       Result Value    GLUCOSE BY METER POCT 127 (*)    BASIC METABOLIC PANEL - Abnormal    Sodium 135      Potassium 4.1      Chloride 92 (*)     Carbon Dioxide (CO2) 33 (*)     Anion Gap 10      Urea Nitrogen 23.1 (*)     Creatinine 1.16      GFR Estimate 72      Calcium 9.6      Glucose 119 (*)    CBC WITH PLATELETS AND DIFFERENTIAL - Abnormal    WBC Count 11.4 (*)     RBC Count 6.02 (*)     Hemoglobin 16.1      Hematocrit 50.4      MCV 84      MCH 26.7      MCHC 31.9      RDW 14.6      Platelet Count 222      % Neutrophils 57      % Lymphocytes 26      % Monocytes 12      % Eosinophils 4      % Basophils 1      % Immature Granulocytes 0      NRBCs per 100 WBC 0      Absolute Neutrophils 6.5      Absolute Lymphocytes 2.9      Absolute Monocytes 1.3      Absolute Eosinophils 0.5      Absolute Basophils 0.1      Absolute Immature Granulocytes 0.0      Absolute NRBCs 0.0     ISTAT GASES LACTATE VENOUS POCT - Abnormal     Lactic Acid POCT 1.7      Bicarbonate Venous POCT 36 (*)     O2 Sat, Venous POCT 44 (*)     pCO2 Venous POCT 51 (*)     pH Venous POCT 7.46 (*)     pO2 Venous POCT 24 (*)     Base Excess/Deficit (+/-) POCT 10.0 (*)    HEPATIC FUNCTION PANEL - Abnormal    Protein Total 8.6 (*)     Albumin 4.2      Bilirubin Total 0.3      Alkaline Phosphatase 100      AST 27      ALT 22      Bilirubin Direct <0.20     INFLUENZA A/B, RSV, & SARS-COV2 PCR - Normal    Influenza A PCR Negative      Influenza B PCR Negative      RSV PCR Negative      SARS CoV2 PCR Negative     ROUTINE UA WITH MICROSCOPIC REFLEX TO CULTURE   BLOOD CULTURE   BLOOD CULTURE     CT Head w/o Contrast   Final Result   IMPRESSION:   1.  No significant change.      XR Chest 2 Views   Final Result   IMPRESSION:       Lung volumes are low with a chronic mildly elevated right hemidiaphragm. An airspace opacity at the medial right lung base could either reflect atelectasis or infection. Left lung is clear. No pleural effusions or pneumothorax. Normal pulmonary    vascularity. Stable, nonenlarged cardiac silhouette.            To be done/followed up on inpatient unit:  Admitting MD orders    Does this patient have any cognitive concerns?:  none    Activity level - Baseline/Home:  Total Care  Activity Level - Current:   Total Care    Patient's Preferred language: English   Needed?: No    Isolation: None  Infection: Not Applicable  Patient tested for COVID 19 prior to admission: YES  Bariatric?: Yes    Vital Signs:   Vitals:    04/17/24 1916 04/17/24 1920 04/17/24 2150   BP: 109/66     Pulse: 91  96   Resp:  20 16   Temp:  97  F (36.1  C)    TempSrc:  Temporal    SpO2: 91%  93%       Cardiac Rhythm:     Was the PSS-3 completed:   Yes  What interventions are required if any?               Family Comments: Mother at bedside  OBS brochure/video discussed/provided to patient/family: No      For the majority of the shift this patient's behavior was Green.    Behavioral interventions performed were frequent rounding.    ED NURSE PHONE NUMBER: *[phone]

## 2024-04-18 NOTE — PLAN OF CARE
Goal Outcome Evaluation:         4/18 9705-0162    Orientation: HECTOR, pt nonverbal  Activity: A2 w/lift; T/R q2hrs  Diet/BS Checks: NPO; TF at goal rate of 55mL/hr  Tele: N/A  IV Access/Drains: L PIV infusing NS at 100mL/hr  Pain Management: No signs of pain or discomfort   Abnormal VS/Results: VSS on RA  Bowel/Bladder: Incontinent of B/B; external male catheter in place  Skin/Wounds: Blanchable redness to coccyx, mepilex in place; scattered scabs/bruises; dry skin  Consults: Pharmacy, Nutrition  D/C Disposition: TBD  Other Info: Pt up from ED at approx 1500. Pt nonverbal. GJtube in place for all medications.

## 2024-04-18 NOTE — ED PROVIDER NOTES
History     Chief Complaint:  Altered Mental Status     History limited as patient is baseline nonverbal and slightly confused.     Keyon Farias is a 63 year old male with a history of TBI and stroke who presents for evaluation of altered mental status. The patient's mother reports that the patient has not been as responsive as normal for the past 3 days. Today, she was unable to wake him up. The past couple of days she has been able to wake him up briefly, but he does not appear to understand her. He has a cough and started sounding congested today. He is nonverbal and paralyzed on the right side following TBI and a stroke. She gave him two tylenol today. Currently, he is more awake than before. He has had symptoms like this before, which was due to an infection. His systolic blood pressure was 90 when she checked today.     Review of Systems   HENT:  Positive for congestion.    Respiratory:  Positive for cough.    Gastrointestinal:  Negative for vomiting.       Independent Historian:   None - Patient Only    Review of External Notes:   I reviewd the 3/26/24 discharge summary.       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  DVT of upper extremity  GERD  Panhypopituitarism  Pneumonia  Seizures  Sepsis due to urinary tract infection  Septic shock  Spastic hemiplegia affecting dominant side  Thyroid disease  Rachestomy care  TBI  Unspecified cerebral artery occlusion with cerebral infarction  UTI  Ventricular fibrillation  Ventricular tachyarrhythmia      Past Surgical History:    Endoscopic ultrasound upper GI tract x2  EGD combined necrosectomy x2  EGD combined x5  Reconstructive facial surgery  Gastro jejunostomy tube change x23  PICC exchange  Laparoscopic appendectomy  Insertion tube gastrotomy  Right hand repair  Tracheostomy x2  Vascular surgery      Physical Exam   Patient Vitals for the past 24 hrs:   BP Temp Temp src Pulse  Resp SpO2   04/17/24 2300 116/64 -- -- 84 15 96 %   04/17/24 2150 -- -- -- 96 16 93 %   04/17/24 1920 -- 97  F (36.1  C) Temporal -- 20 --   04/17/24 1916 109/66 -- -- 91 -- 91 %      Constitutional:       Appearance: Ill appearance.   HENT:      Head: Normocephalic and atraumatic.   Eyes:      Extraocular Movements: Extraocular movements intact.      Conjunctiva/sclera: Conjunctivae normal.   Cardiovascular:      Rate and Rhythm: Normal rate and regular rhythm.   Pulmonary:      Effort: Pulmonary effort is normal.  No respiratory distress.      Breath sounds: Coarse breath sounds bilaterally with intermittent wet cough.  Abdominal:      General: Abdomen is flat. There is no distension.      Palpations: Abdomen is soft.      Tenderness: There is no abdominal tenderness.   Musculoskeletal:      Cervical back: Normal range of motion. No rigidity.   Skin:     General: Skin is warm and dry.   Neurological:      General: Baseline hemiplegia of the right side.     Mental Status: Intermittently following simple commands.  Although not at baseline as patient unable to give thumbs up or thumbs down for yes and no answers.  Psychiatric:         Mood and Affect: Mood normal.         Behavior: Behavior normal.    Emergency Department Course     Imaging:  CT Head w/o Contrast   Final Result   IMPRESSION:   1.  No significant change.      XR Chest 2 Views   Final Result   IMPRESSION:       Lung volumes are low with a chronic mildly elevated right hemidiaphragm. An airspace opacity at the medial right lung base could either reflect atelectasis or infection. Left lung is clear. No pleural effusions or pneumothorax. Normal pulmonary    vascularity. Stable, nonenlarged cardiac silhouette.         Report per radiology    Laboratory:  Labs Ordered and Resulted from Time of ED Arrival to Time of ED Departure   GLUCOSE BY METER - Abnormal       Result Value    GLUCOSE BY METER POCT 127 (*)    BASIC METABOLIC PANEL - Abnormal    Sodium  135      Potassium 4.1      Chloride 92 (*)     Carbon Dioxide (CO2) 33 (*)     Anion Gap 10      Urea Nitrogen 23.1 (*)     Creatinine 1.16      GFR Estimate 72      Calcium 9.6      Glucose 119 (*)    CBC WITH PLATELETS AND DIFFERENTIAL - Abnormal    WBC Count 11.4 (*)     RBC Count 6.02 (*)     Hemoglobin 16.1      Hematocrit 50.4      MCV 84      MCH 26.7      MCHC 31.9      RDW 14.6      Platelet Count 222      % Neutrophils 57      % Lymphocytes 26      % Monocytes 12      % Eosinophils 4      % Basophils 1      % Immature Granulocytes 0      NRBCs per 100 WBC 0      Absolute Neutrophils 6.5      Absolute Lymphocytes 2.9      Absolute Monocytes 1.3      Absolute Eosinophils 0.5      Absolute Basophils 0.1      Absolute Immature Granulocytes 0.0      Absolute NRBCs 0.0     ISTAT GASES LACTATE VENOUS POCT - Abnormal    Lactic Acid POCT 1.7      Bicarbonate Venous POCT 36 (*)     O2 Sat, Venous POCT 44 (*)     pCO2 Venous POCT 51 (*)     pH Venous POCT 7.46 (*)     pO2 Venous POCT 24 (*)     Base Excess/Deficit (+/-) POCT 10.0 (*)    ROUTINE UA WITH MICROSCOPIC REFLEX TO CULTURE - Abnormal    Color Urine Yellow      Appearance Urine Clear      Glucose Urine Negative      Bilirubin Urine Negative      Ketones Urine Negative      Specific Gravity Urine 1.028      Blood Urine Negative      pH Urine 8.0 (*)     Protein Albumin Urine 30 (*)     Urobilinogen Urine 2.0      Nitrite Urine Negative      Leukocyte Esterase Urine Negative      RBC Urine 5 (*)     WBC Urine 3      Hyaline Casts Urine 6 (*)    HEPATIC FUNCTION PANEL - Abnormal    Protein Total 8.6 (*)     Albumin 4.2      Bilirubin Total 0.3      Alkaline Phosphatase 100      AST 27      ALT 22      Bilirubin Direct <0.20     INFLUENZA A/B, RSV, & SARS-COV2 PCR - Normal    Influenza A PCR Negative      Influenza B PCR Negative      RSV PCR Negative      SARS CoV2 PCR Negative     BLOOD CULTURE   BLOOD CULTURE       Emergency Department Course &  Assessments:         Interventions:  Medications   piperacillin-tazobactam (ZOSYN) 4.5 g vial to attach to  mL bag (has no administration in time range)   Brivaracetam (BRIVIACT) solution 100 mg (has no administration in time range)   carBAMazepine (TEGretol) suspension 150 mg (has no administration in time range)        Independent Interpretation (X-rays, CTs, rhythm strip):  See ED Course    Assessments/Consultations/Discussion of Management or Tests:  ED Course as of 24 I examined the patient and obtained history.       I spoke with Dr. Hernandez of the hospitalist team regarding the patient, who accepted the patient for admission.      I updated the patient.     Pharmacist updated that patient is normally on multiple seizure medications.  Will resume patient's home seizure medications via G-tube.       Social Determinants of Health affecting care:   None    Disposition:  The patient was admitted to the hospital under the care of Dr. Hernandez.     Impression & Plan    CMS Diagnoses: None    Medical Decision Makin-year-old male as described above presents to the emergency department for altered mental status from baseline.  Patient hemodynamically stable at time of evaluation.  Afebrile.  Baseline nonverbal.  On my evaluation, patient having raspy breathing with intermittent wet coughs and coarse breath sounds with bilateral lungs.  Patient is G-tube dependent.  History of aspiration pneumonia.  Ill-appearing.  Triage lab work ordered by RN demonstrates mild leukocytosis of 11.4.  Differential diagnosis considered includes, but not limited to, sepsis, aspiration pneumonia, or viral pneumonia.  Obtain head CT evaluation for potential intracranial causes.  Symptoms have been ongoing for 3 days, will evaluate for potentially completed CVA/ICH.  Chest x-ray.  Discussed care plan with mother who voiced understanding and agreement with plan.  Answered all  questions.  Additional workup and orders as listed in chart.    Please refer to ED course above as part of continuation of MDM for details on the patient's treatment course and any changes or updates in care plan beyond my initial evaluation and MDM creation.      Diagnosis:    ICD-10-CM    1. Community acquired pneumonia of right lower lobe of lung  J18.9       2. Altered mental status, unspecified altered mental status type  R41.82            Discharge Medications:  New Prescriptions    No medications on file          Scribe Disclosure:  I, Pasquale Rahman, am serving as a scribe at 8:59 PM on 4/17/2024 to document services personally performed by Jaylen Rodas DO based on my observations and the provider's statements to me.     4/17/2024   Jaylen Rodas DO Yeh, Ferris, DO  04/17/24 [id number]
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
## 2024-04-18 NOTE — PROVIDER NOTIFICATION
MD Notification    Notified Person: MD    Notified Person Name: Dr. Ash    Notification Date/Time: 04/18/24 5:39 PM     Notification Interaction: Vocera    Purpose of Notification: FYI Positive blood cultures on peripheral blood, 1st day of incubation, Gram positive cocci in clusters    Orders Received:    Comments:

## 2024-04-18 NOTE — ED NOTES
Patient turned, external male cath in place. Patient congested and coughing with thick yellow secretions. Patient suctioned with yanker with less congestion.

## 2024-04-18 NOTE — PHARMACY-ADMISSION MEDICATION HISTORY
Pharmacist Admission Medication History    Admission medication history is complete. The information provided in this note is only as accurate as the sources available at the time of the update.    Information Source(s): Family member, [employer]s via phone    Pertinent Information: Spoke with patient's mother, Savannah, on the phone     Changes made to PTA medication list:  Added: None  Deleted: None  Changed: None    Allergies reviewed with patient and updates made in EHR: no    Medication History Completed By: MADAI TIJERINA RP 4/18/2024 9:45 AM    PTA Med List   Medication Sig Last Dose    acetaminophen (TYLENOL) 325 MG tablet Take 650 mg by mouth every 6 hours as needed for mild pain Past Week    albuterol (PROVENTIL) (5 MG/ML) 0.5% neb solution Take 0.5 mLs (2.5 mg) by nebulization every 6 hours as needed for shortness of breath, wheezing or cough 0700 1100 1500 1900 with mucomyst Unknown    bacitracin 500 UNIT/GM external ointment Apply topically daily as needed for wound care To PEG site. Past Week    Brivaracetam (BRIVIACT) 10 MG/ML solution 100 mg by Oral or Feeding Tube route 2 times daily 0900, 2100 4/17/2024    Calcium-Magnesium-Zinc 333-133-5 MG TABS per tablet Take 1 tablet by mouth daily 4/17/2024    carBAMazepine (TEGRETOL) 100 MG/5ML suspension 7.5 mLs (150 mg) by Oral or Feeding Tube route 3 times daily At 06:00, 12:00, and 24:00 for seizures 4/17/2024    carBAMazepine (TEGRETOL) 100 MG/5ML suspension 100 mg by Per Feeding Tube route daily Take at 1800 4/17/2024    COMPOUNDED NON-CONTROLLED SUBSTANCE (CMPD RX) - PHARMACY TO MIX COMPOUNDED MEDICATION Scopolamine 0.4mg capsules - take  2 capsule by feeding tube three times daily as needed Unknown    Cyanocobalamin (VITAMIN B-12 PO) 1,000 mcg by Per Feeding Tube route daily 4/17/2024    glycopyrrolate (ROBINUL) 1 MG tablet TAKE 1 TABLET(1 MG) BY MOUTH TWICE DAILY AS NEEDED FOR SECRETIONS Unknown    guaiFENesin (MUCINEX) 600 MG 12 hr tablet Take 1  tablet (600 mg) by mouth 2 times daily as needed for congestion Unknown    hydrocortisone (CORTAID) 1 % external cream Apply topically 2 times daily as needed Apply to reddened memo areas as needed Unknown    hydrocortisone (CORTEF) 5 MG tablet Take 15 mg (3 tablets) in the morning and 7.5 mg (1.5 tablet)  at 2:00 PM. During illness patient takes more as a stress dose. Please increase the dose as directed. (Patient taking differently: Take 15 mg (3 tablets) in the morning and 7.5 mg (1.5 tablet)  at 4:00 PM. Per feeding tube. During illness patient takes more as a stress dose. Mother reports doubling each dose for fever) 4/17/2024    levothyroxine (SYNTHROID/LEVOTHROID) 112 MCG tablet Take 1 tablet (112 mcg) by mouth daily 4/17/2024    metoclopramide (REGLAN) 10 MG/10ML SOLN solution Take 10 mLs (10 mg) by mouth 4 times daily (before meals and nightly) 0800, 1200, 1600, 2000 Disconnects bag before administration, then waits 45 mins before reconnecting after giving the medication 4/17/2024    miconazole (MICATIN) 2 % external powder Apply topically 2 times daily as needed Unknown    multivitamin, therapeutic (THERA-VIT) TABS tablet 1 tablet by Per Feeding Tube route daily 4/17/2024    pantoprazole (PROTONIX) 2 mg/mL SUSP suspension TAKE 20ML PER FEEDING TUBE DAILY 4/17/2024    potassium & sodium phosphates (NEUTRA-PHOS) 280-160-250 MG Packet Take 1 packet by mouth daily 4/17/2024    testosterone cypionate (DEPOTESTOSTERONE) 200 MG/ML injection INJECT 0.25ML IN THE MUSCLE ONCE A WEEK. DISCARD REMAINDER OF VIAL (Patient taking differently: On Thursdays) 4/11/2024    UNABLE TO FIND Take 0.125 teaspoonful by mouth 2 times daily MEDICATION NAME: pink salt 1/8 teaspoon po bid (instead of sodium bicarb). 4/17/2024    vitamin C (ASCORBIC ACID) 1000 MG TABS 1,000 mg by Oral or Feeding Tube route daily  4/17/2024    vitamin D3 (CHOLECALCIFEROL) 2000 units (50 mcg) tablet 4,000 Units by Per Feeding Tube route daily 4/17/2024

## 2024-04-18 NOTE — PROGRESS NOTES
RECEIVING UNIT ED HANDOFF REVIEW    ED Nurse Handoff Report was reviewed by: Myah Chavez RN on April 18, 2024 at 2:06 PM

## 2024-04-18 NOTE — ED NOTES
Below text message sent to the admitting provider.     Pt DEXTER in ED room # 29. Any considering to get him started on the Gevity feeding mentioned by his mother?  Also soft BP 93/59. Any IVF?     Please advise  Nevaeh [phone]

## 2024-04-18 NOTE — PROGRESS NOTES
[facility]    Hospitalist Progress Note    Assessment & Plan   Keyon Farias is a 63 year old male with a past medical history of TBI with right-sided spastic hemiaplasia, nonverbal, dysphagia, panhypopituitaryism, and seizures presents to hospital with altered mental status secondary to aspiration pneumonia.    Acute metabolic encephalopathy  Aspiration pneumonia  Chronic dysphagia status post PEG tube  Patient presenting with altered mental status and increased lethargy for the last 2 days.  Workup in the emergency room significant for mild leukocytosis and a new right lung base opacity.  Overall presentation is concerning for an aspiration pneumonia.  He was started on Zosyn in emergency room.  -Continue with Zosyn  -N.p.o.  -Follow blood cultures  -IV hydration  -Continue tube feeds     History of TBI  Spastic hemiaplagia and right sided paresis  Hx of seizures  The patient is nonverbal at baseline and communicating by her thumbs up for yes and thumbs down for now.  He is fully dependent on his mother for cares.  -Resume PTA meds .     Panhypopituitarism  -resume pta meds .  -Hydrocortisone started based on previous dose     GERD.    -Continue home medications      DVT Prophylaxis: Enoxaparin (Lovenox) SQ  Code Status: Full Code     51 MINUTES SPENT BY ME on the date of service doing chart review, history, exam, documentation & further activities per the note.  Medically Ready for Discharge: Anticipated Tomorrow    Clinically Significant Risk Factors Present on Admission          # Hypocalcemia: Lowest Ca = 8.4 mg/dL in last 2 days, will monitor and replace as appropriate                  # Financial/Environmental Concerns:           Melisa Ash MD  Text Page   (7am to 6pm)    Interval History   Patient is sleepy, wakes up, he is nonverbal, oxygen has been weaned off,  tube feeding initiated    -Data reviewed today: I reviewed all new labs and imaging results over the last 24 hours.  I  Physical Exam     Vital Signs with Ranges  Temp:  [97  F (36.1  C)-97.5  F (36.4  C)] 97.5  F (36.4  C)  Pulse:  [] 71  Resp:  [10-20] 17  BP: ()/(59-93) 115/66  SpO2:  [90 %-99 %] 97 %  No intake/output data recorded.    Constitutional: Awake but nonverbal  Respiratory: Clear to auscultation bilaterally, no crackles or wheezing  Cardiovascular: Regular rate and rhythm, normal S1 and S2, and no murmur noted  GI: Normal bowel sounds, soft, non-distended, non-tender, G-tube present  Skin/Integumen: No rashes, no cyanosis, no edema  Neuro :  nonverbal    Medications   Current Facility-Administered Medications   Medication Dose Route Frequency Provider Last Rate Last Admin    dextrose 10% infusion   Intravenous Continuous PRN Patrick Hernandez MD        sodium chloride 0.9 % infusion   Intravenous Continuous Patrick Hernandez  mL/hr at 04/18/24 1148 New Bag at 04/18/24 1148     Current Facility-Administered Medications   Medication Dose Route Frequency Provider Last Rate Last Admin    Brivaracetam (BRIVIACT) solution 100 mg  100 mg Oral or Feeding Tube BID Patrick Hernandez MD   100 mg at 04/18/24 0929    carBAMazepine (TEGretol) suspension 100 mg  100 mg Per Feeding Tube Daily Patrick Hernandez MD        carBAMazepine (TEGretol) suspension 150 mg  150 mg Oral or Feeding Tube TID Patrick Hernandez MD   150 mg at 04/18/24 0929    enoxaparin ANTICOAGULANT (LOVENOX) injection 40 mg  40 mg Subcutaneous Q24H Patrick Hernandez MD   40 mg at 04/18/24 1005    hydrocortisone (CORTEF) half-tab 7.5 mg  7.5 mg Oral Q24H Patrick Hernandez MD        hydrocortisone (CORTEF) tablet 15 mg  15 mg Oral Daily Patrick Hernandez MD   15 mg at 04/18/24 0940    piperacillin-tazobactam (ZOSYN) 3.375 g vial to attach to  mL bag  3.375 g Intravenous Q6H Patrick Hernandez MD   Stopped at 04/18/24 0700    sodium chloride (PF) 0.9% PF flush 3 mL  3 mL  Intracatheter Q8H Patrick Hernandez MD   3 mL at 04/18/24 0150       Data   Recent Labs   Lab 04/18/24  1137 04/18/24 0926 04/17/24 2010   WBC  --  8.6 11.4*   HGB  --  13.8 16.1   MCV  --  85 84   PLT  --  188 222   NA  --  138 135   POTASSIUM  --  3.8 4.1   CHLORIDE  --  97* 92*   CO2  --  31* 33*   BUN  --  20.2 23.1*   CR  --  1.18* 1.16   ANIONGAP  --  10 10   STEVE  --  8.4* 9.6   GLC 92 93 119*   ALBUMIN  --   --  4.2   PROTTOTAL  --   --  8.6*   BILITOTAL  --   --  0.3   ALKPHOS  --   --  100   ALT  --   --  22   AST  --   --  27     Recent Labs   Lab 04/18/24  1137 04/18/24 0926 04/17/24 2010 04/17/24  1918   GLC 92 93 119* 127*       Imaging:   Recent Results (from the past 24 hour(s))   XR Chest 2 Views    Narrative    EXAM: XR CHEST 2 VIEWS  LOCATION: [facility]  DATE: 4/17/2024    INDICATION: Cough. Evaluate for pneumonia.  COMPARISON: Chest radiograph 03/21/2024.      Impression    IMPRESSION:     Lung volumes are low with a chronic mildly elevated right hemidiaphragm. An airspace opacity at the medial right lung base could either reflect atelectasis or infection. Left lung is clear. No pleural effusions or pneumothorax. Normal pulmonary   vascularity. Stable, nonenlarged cardiac silhouette.   CT Head w/o Contrast    Narrative    EXAM: CT HEAD W/O CONTRAST  LOCATION: [facility]  DATE: 4/17/2024    INDICATION: Confusion, history of prior TBI CVA  COMPARISON: 09/25/2023  TECHNIQUE: Routine CT Head without IV contrast. Multiplanar reformats. Dose reduction techniques were used.    FINDINGS:  INTRACRANIAL CONTENTS: Large area of chronic encephalomalacia centered in the left temporoparietal region. Mild areas of chronic encephalomalacia anterior aspect of both frontal lobes. No CT evidence of acute infarct. Mild presumed chronic small vessel   ischemic changes. Moderate to marked enlargement of ventricular system, similar to prior. Moderate  atrophy.     VISUALIZED ORBITS/SINUSES/MASTOIDS: No intraorbital abnormality. Moderate mucosal thickening with fluid sphenoid sinuses. Mild mucosal changes remaining paranasal sinuses No middle ear or mastoid effusion.    BONES/SOFT TISSUES: No acute abnormality.      Impression    IMPRESSION:  1.  No significant change.

## 2024-04-18 NOTE — ED NOTES
Bed: ED29  Expected date:   Expected time:   Means of arrival:   Comments:  E3  63 M new onset fatigue/strange behavior/non-verbal, non amb  Here now

## 2024-04-18 NOTE — ED TRIAGE NOTES
Patient comes in via EMS from home.  He is non verbal and non ambulatory from a prior TBI.  His mother states he has been sleeping all day and that is abnormal for him.

## 2024-04-19 DIAGNOSIS — E03.9 HYPOTHYROIDISM, UNSPECIFIED TYPE: ICD-10-CM

## 2024-04-19 PROCEDURE — 250N000011 HC RX IP 250 OP 636: Performed by: HOSPITALIST

## 2024-04-19 PROCEDURE — 99233 SBSQ HOSP IP/OBS HIGH 50: CPT | Performed by: INTERNAL MEDICINE

## 2024-04-19 PROCEDURE — 258N000003 HC RX IP 258 OP 636: Performed by: HOSPITALIST

## 2024-04-19 PROCEDURE — 250N000013 HC RX MED GY IP 250 OP 250 PS 637: Performed by: INTERNAL MEDICINE

## 2024-04-19 PROCEDURE — 250N000013 HC RX MED GY IP 250 OP 250 PS 637: Performed by: HOSPITALIST

## 2024-04-19 PROCEDURE — 120N000001 HC R&B MED SURG/OB

## 2024-04-19 PROCEDURE — 250N000009 HC RX 250: Performed by: INTERNAL MEDICINE

## 2024-04-19 RX ORDER — LEVOTHYROXINE SODIUM 112 UG/1
112 TABLET ORAL DAILY
Qty: 90 TABLET | Refills: 0 | Status: SHIPPED | OUTPATIENT
Start: 2024-04-19 | End: 2024-08-14

## 2024-04-19 RX ADMIN — CARBAMAZEPINE 150 MG: 100 SUSPENSION ORAL at 12:28

## 2024-04-19 RX ADMIN — SIMETHICONE 20 MG: 20 SOLUTION/ DROPS ORAL at 06:10

## 2024-04-19 RX ADMIN — POTASSIUM & SODIUM PHOSPHATES POWDER PACK 280-160-250 MG 1 PACKET: 280-160-250 PACK at 10:13

## 2024-04-19 RX ADMIN — PIPERACILLIN AND TAZOBACTAM 3.38 G: 3; .375 INJECTION, POWDER, FOR SOLUTION INTRAVENOUS at 12:28

## 2024-04-19 RX ADMIN — METOCLOPRAMIDE HYDROCHLORIDE 10 MG: 5 SOLUTION ORAL at 10:12

## 2024-04-19 RX ADMIN — CARBAMAZEPINE 100 MG: 100 SUSPENSION ORAL at 17:47

## 2024-04-19 RX ADMIN — BRIVARACETAM 100 MG: 10 SOLUTION ORAL at 10:16

## 2024-04-19 RX ADMIN — BRIVARACETAM 100 MG: 10 SOLUTION ORAL at 20:24

## 2024-04-19 RX ADMIN — CARBAMAZEPINE 150 MG: 100 SUSPENSION ORAL at 06:10

## 2024-04-19 RX ADMIN — METOCLOPRAMIDE HYDROCHLORIDE 10 MG: 5 SOLUTION ORAL at 12:27

## 2024-04-19 RX ADMIN — CARBAMAZEPINE 150 MG: 100 SUSPENSION ORAL at 00:03

## 2024-04-19 RX ADMIN — HYDROCORTISONE 7.5 MG: 5 TABLET ORAL at 17:47

## 2024-04-19 RX ADMIN — PIPERACILLIN AND TAZOBACTAM 3.38 G: 3; .375 INJECTION, POWDER, FOR SOLUTION INTRAVENOUS at 00:04

## 2024-04-19 RX ADMIN — LEVOTHYROXINE SODIUM 112 MCG: 112 TABLET ORAL at 10:13

## 2024-04-19 RX ADMIN — METOCLOPRAMIDE HYDROCHLORIDE 10 MG: 5 SOLUTION ORAL at 20:24

## 2024-04-19 RX ADMIN — METOCLOPRAMIDE HYDROCHLORIDE 10 MG: 5 SOLUTION ORAL at 17:47

## 2024-04-19 RX ADMIN — HYDROCORTISONE 15 MG: 10 TABLET ORAL at 10:13

## 2024-04-19 RX ADMIN — ENOXAPARIN SODIUM 40 MG: 40 INJECTION SUBCUTANEOUS at 10:13

## 2024-04-19 RX ADMIN — PIPERACILLIN AND TAZOBACTAM 3.38 G: 3; .375 INJECTION, POWDER, FOR SOLUTION INTRAVENOUS at 05:11

## 2024-04-19 RX ADMIN — PIPERACILLIN AND TAZOBACTAM 3.38 G: 3; .375 INJECTION, POWDER, FOR SOLUTION INTRAVENOUS at 17:47

## 2024-04-19 RX ADMIN — SODIUM CHLORIDE: 9 INJECTION, SOLUTION INTRAVENOUS at 21:11

## 2024-04-19 ASSESSMENT — ACTIVITIES OF DAILY LIVING (ADL)
ADLS_ACUITY_SCORE: 75
ADLS_ACUITY_SCORE: 71
DEPENDENT_IADLS:: CLEANING;COOKING;LAUNDRY;SHOPPING;MEAL PREPARATION;MEDICATION MANAGEMENT;MONEY MANAGEMENT;TRANSPORTATION;INCONTINENCE
ADLS_ACUITY_SCORE: 77
ADLS_ACUITY_SCORE: 75
ADLS_ACUITY_SCORE: 75
ADLS_ACUITY_SCORE: 73
ADLS_ACUITY_SCORE: 73
ADLS_ACUITY_SCORE: 77
ADLS_ACUITY_SCORE: 77
ADLS_ACUITY_SCORE: 75
ADLS_ACUITY_SCORE: 73
ADLS_ACUITY_SCORE: 73
ADLS_ACUITY_SCORE: 77
ADLS_ACUITY_SCORE: 73
ADLS_ACUITY_SCORE: 77
ADLS_ACUITY_SCORE: 75
ADLS_ACUITY_SCORE: 75
ADLS_ACUITY_SCORE: 71

## 2024-04-19 NOTE — PLAN OF CARE
Orientations: Non-verbal.   Vitals/Pain: VSS on RA. No sign of discomfort noted  Tele: N/A  Lines/Drains: PIV - NS @ 100 ml/hr. GJ tube feeds @ 55 ml/hr - goal rate  Skin/Wounds: Excoriated buttocks - area cleansed -Mepilex to coccyx. Q2 hrs turns  GI/: Pt inctontinent. External cath in place.   Labs: Abnormal/Trends, Electrolyte Replacement- Awaiting am labs  Ambulation/Assist: Lift x2 Assist  Sleep Quality: HECTOR  Plan: Continue IV abx. Continue skin care      Goal Outcome Evaluation:  Plan of Care Reviewed With: patient  Overall Patient Progress: improvingOverall Patient Progress: improving

## 2024-04-19 NOTE — PLAN OF CARE
Goal Outcome Evaluation:         4/19 8988-2183    Orientation: HECTOR, pt nonverbal, more awake and alert today  Activity: A2 w/lift, T/R q2hrs  Diet/BS Checks: Strict NPO, Continuous TF at 55mL/hr  Tele:  N/A  IV Access/Drains: L PIV infusing 100mL/hr with intermittent antibiotics  Pain Management: No signs of discomfort   Abnormal VS/Results: VSS on RA  Bowel/Bladder: Incontinent of B/B, large BM this shift, male external cath in place  Skin/Wounds: Scattered bruising/scabbing; redness to coccyx, mepilex in place  Consults: Nutrition, CC  D/C Disposition: 1-2 days pending antibiotic course  Other Info:

## 2024-04-19 NOTE — PROGRESS NOTES
[facility]    Hospitalist Progress Note    Assessment & Plan   Keyon Farias is a 63 year old male with a past medical history of TBI with right-sided spastic hemiaplasia, nonverbal, dysphagia, panhypopituitaryism, and seizures presents to hospital with altered mental status secondary to aspiration pneumonia.    Acute metabolic encephalopathy  Aspiration pneumonia  Chronic dysphagia status post PEG tube  Patient presenting with altered mental status and increased lethargy for the last 2 days.  Workup in the emergency room significant for mild leukocytosis and a new right lung base opacity.  Overall presentation is concerning for an aspiration pneumonia.  He was started on Zosyn in emergency room.  -Patient was quite sleepy on admission, more awake now, continue with Zosyn, continue strict n.p.o. status, tube feeds have been restarted, blood cultures 1/2 positive for coag negative staph, will repeat another set of blood culture today, 1/2 possibly contaminant.  -Will stop fluids today, I contacted mother and updated her, tentative discharge 4/20.  Am labs      History of TBI  Spastic hemiaplagia and right sided paresis  Hx of seizures  The patient is nonverbal at baseline and communicating by her thumbs up for yes and thumbs down for now.  He is fully dependent on his mother for cares.  -Continue PTA meds     Panhypopituitarism  -resume pta meds .  -Hydrocortisone started based on previous dose     GERD.    -Continue home medications      DVT Prophylaxis: Enoxaparin (Lovenox) SQ  Code Status: Full Code     40 MINUTES SPENT BY ME on the date of service doing chart review, history, exam, documentation & further activities per the note.  Medically Ready for Discharge: Anticipated Tomorrow    Clinically Significant Risk Factors          # Hypocalcemia: Lowest Ca = 8.4 mg/dL in last 2 days, will monitor and replace as appropriate                      # Financial/Environmental Concerns:            Melisa Ash MD  Text Page   (7am to 6pm)    Interval History   Alert today, as per mother's request examined his ears, noted wax in the external auditory canal.    -Data reviewed today: I reviewed all new labs and imaging results over the last 24 hours. I  Physical Exam     Vital Signs with Ranges  Temp:  [97.9  F (36.6  C)-98.4  F (36.9  C)] 97.9  F (36.6  C)  Pulse:  [71-89] 82  Resp:  [15-17] 16  BP: (101-120)/(55-67) 109/59  SpO2:  [95 %-99 %] 98 %  I/O last 3 completed shifts:  In: 1676.67 [P.O.:240; I.V.:1216.67]  Out: 400 [Urine:400]    Constitutional: Awake but nonverbal  Respiratory: Basilar crackles present  Cardiovascular: Regular rate and rhythm, normal S1 and S2, and no murmur noted  GI: Normal bowel sounds, soft, non-distended, non-tender, G-tube present  Skin/Integumen: No rashes, no cyanosis, no edema  Neuro :  nonverbal    Medications   Current Facility-Administered Medications   Medication Dose Route Frequency Provider Last Rate Last Admin    dextrose 10% infusion   Intravenous Continuous PRN Patrick Hernandez MD        sodium chloride 0.9 % infusion   Intravenous Continuous Patrick Hernandez  mL/hr at 04/18/24 2154 New Bag at 04/18/24 2154     Current Facility-Administered Medications   Medication Dose Route Frequency Provider Last Rate Last Admin    Brivaracetam (BRIVIACT) solution 100 mg  100 mg Oral or Feeding Tube BID Patrick Hernandez MD   100 mg at 04/19/24 1016    carBAMazepine (TEGretol) suspension 100 mg  100 mg Per Feeding Tube Daily Patrick Hernandez MD   100 mg at 04/18/24 1638    carBAMazepine (TEGretol) suspension 150 mg  150 mg Oral or Feeding Tube TID Patrick Hernandez MD   150 mg at 04/19/24 0610    enoxaparin ANTICOAGULANT (LOVENOX) injection 40 mg  40 mg Subcutaneous Q24H Patrick Hernandez MD   40 mg at 04/19/24 1013    hydrocortisone (CORTEF) half-tab 7.5 mg  7.5 mg Oral Q24H Patrick Hernandez MD   7.5 mg at  04/18/24 1637    hydrocortisone (CORTEF) tablet 15 mg  15 mg Oral Daily Patrick Hernandez MD   15 mg at 04/19/24 1013    levothyroxine (SYNTHROID/LEVOTHROID) tablet 112 mcg  112 mcg Oral Daily Melisa Ash MD   112 mcg at 04/19/24 1013    metoclopramide (REGLAN) solution 10 mg  10 mg Oral 4x Daily AC & HS Melisa Ash MD   10 mg at 04/19/24 1012    pantoprazole (PROTONIX) 2 mg/mL suspension 20 mg  20 mg Per G Tube QAM AC Melisa Ash MD   20 mg at 04/19/24 0610    piperacillin-tazobactam (ZOSYN) 3.375 g vial to attach to  mL bag  3.375 g Intravenous Q6H Patrick Hernandez MD 0 mL/hr at 04/18/24 0700 3.375 g at 04/19/24 0511    potassium & sodium phosphates (NEUTRA-PHOS) Packet 1 packet  1 packet Oral Daily Melisa Ash MD   1 packet at 04/19/24 1013    sodium chloride (PF) 0.9% PF flush 3 mL  3 mL Intracatheter Q8H Patrick Hernandez MD   3 mL at 04/18/24 0150       Data   Recent Labs   Lab 04/18/24 1137 04/18/24 0926 04/17/24 2010   WBC  --  8.6 11.4*   HGB  --  13.8 16.1   MCV  --  85 84   PLT  --  188 222   NA  --  138 135   POTASSIUM  --  3.8 4.1   CHLORIDE  --  97* 92*   CO2  --  31* 33*   BUN  --  20.2 23.1*   CR  --  1.18* 1.16   ANIONGAP  --  10 10   STEVE  --  8.4* 9.6   GLC 92 93 119*   ALBUMIN  --   --  4.2   PROTTOTAL  --   --  8.6*   BILITOTAL  --   --  0.3   ALKPHOS  --   --  100   ALT  --   --  22   AST  --   --  27     Recent Labs   Lab 04/18/24 1137 04/18/24 0926 04/17/24 2010 04/17/24  1918   GLC 92 93 119* 127*       Imaging:   No results found for this or any previous visit (from the past 24 hour(s)).

## 2024-04-19 NOTE — CONSULTS
Care Management Initial Consult    General Information  Assessment completed with: -chart review, Parents, Savannah  Type of CM/SW Visit: Initial Assessment    Primary Care Provider verified and updated as needed: Yes   Readmission within the last 30 days:        Reason for Consult: discharge planning, other (see comments) (elevated risk score)  Advance Care Planning: Advance Care Planning Reviewed: present on chart          Communication Assessment  Patient's communication style: spoken language (English or Bilingual)    Hearing Difficulty or Deaf: no   Wear Glasses or Blind: no    Cognitive  Cognitive/Neuro/Behavioral: .WDL except, speech  Level of Consciousness: other (see comments) (sleepng)  Arousal Level: opens eyes spontaneously  Orientation: other (see comments) (Pt nonverbal, unable to assess)  Mood/Behavior: calm, cooperative     Speech: other (see comments) (Makes sounds, not words)    Living Environment:   People in home: parent(s) (Mother and overnight PCA)     Current living Arrangements: house      Able to return to prior arrangements: yes       Family/Social Support:  Care provided by: homecare agency, parent(s)  Provides care for: no one, unable/limited ability to care for self  Marital Status: Single  Parent(s)          Description of Support System: Supportive, Involved         Current Resources:   Patient receiving home care services: Yes  Skilled Home Care Services:  (Not open to [facility] or to [facility])  Community Resources: County Worker, PCA  Equipment currently used at home: hospital bed, lift device  Supplies currently used at home:      Employment/Financial:  Employment Status: disabled        Financial Concerns: none   Referral to Financial Worker: No       Does the patient's insurance plan have a 3 day qualifying hospital stay waiver?  Yes     Which insurance plan 3 day waiver is available? [employer]    Will the waiver be used for post-acute placement? No    Lifestyle &  Psychosocial Needs:  Social Determinants of Health     Food Insecurity: Not on file   Depression: Not at risk (4/1/2024)    PHQ-2     PHQ-2 Score: 1   Housing Stability: Not on file   Tobacco Use: Medium Risk (4/5/2024)    Patient History     Smoking Tobacco Use: Former     Smokeless Tobacco Use: Never     Passive Exposure: Not on file   Financial Resource Strain: Not on file   Alcohol Use: Not on file   Transportation Needs: Not on file   Physical Activity: Not on file   Interpersonal Safety: Not on file   Stress: Not on file   Social Connections: Not on file   Health Literacy: Not on file       Functional Status:  Prior to admission patient needed assistance:   Dependent ADLs:: Bathing, Dressing, Eating, Grooming, Incontinence, Positioning, Transfers, Wheelchair-with assist  Dependent IADLs:: Cleaning, Cooking, Laundry, Shopping, Meal Preparation, Medication Management, Money Management, Transportation, Incontinence       Mental Health Status: Hx of TBI          Chemical Dependency Status: None                Values/Beliefs:  Spiritual, Cultural Beliefs, Synagogue Practices, Values that affect care: no               Additional Information:  Initial consult done with mother via phone. Mother is patient's legal guardian. Per Mother, no skilled HHC services (unable to find for over a year). Inquired at [facility], [facility] and [facility] (all listed recently in chart) and all state patient is not open to any of their services.   Anticipated return to home with mother as primary care giver, home enteral supplies through [employer] and overnight PCA help. Mother would like a stretcher ride for home and address on facesheet is correct.  Updated mother that discharge could happen as early as tomorrow Saturday.     Megan Lora RN   [facility]   Phone [phone], Clip or [phone]

## 2024-04-20 VITALS
WEIGHT: 156.97 LBS | BODY MASS INDEX: 22.52 KG/M2 | SYSTOLIC BLOOD PRESSURE: 109 MMHG | RESPIRATION RATE: 16 BRPM | HEART RATE: 62 BPM | TEMPERATURE: 97.8 F | OXYGEN SATURATION: 94 % | DIASTOLIC BLOOD PRESSURE: 43 MMHG

## 2024-04-20 PROCEDURE — 250N000013 HC RX MED GY IP 250 OP 250 PS 637: Performed by: HOSPITALIST

## 2024-04-20 PROCEDURE — 250N000013 HC RX MED GY IP 250 OP 250 PS 637: Performed by: INTERNAL MEDICINE

## 2024-04-20 PROCEDURE — 250N000011 HC RX IP 250 OP 636: Performed by: HOSPITALIST

## 2024-04-20 PROCEDURE — 250N000009 HC RX 250: Performed by: INTERNAL MEDICINE

## 2024-04-20 PROCEDURE — 99239 HOSP IP/OBS DSCHRG MGMT >30: CPT | Performed by: INTERNAL MEDICINE

## 2024-04-20 RX ADMIN — SIMETHICONE 20 MG: 20 SOLUTION/ DROPS ORAL at 06:40

## 2024-04-20 RX ADMIN — PIPERACILLIN AND TAZOBACTAM 3.38 G: 3; .375 INJECTION, POWDER, FOR SOLUTION INTRAVENOUS at 06:40

## 2024-04-20 RX ADMIN — METOCLOPRAMIDE HYDROCHLORIDE 10 MG: 5 SOLUTION ORAL at 12:03

## 2024-04-20 RX ADMIN — CARBAMAZEPINE 150 MG: 100 SUSPENSION ORAL at 00:34

## 2024-04-20 RX ADMIN — HYDROCORTISONE 15 MG: 10 TABLET ORAL at 07:46

## 2024-04-20 RX ADMIN — LEVOTHYROXINE SODIUM 112 MCG: 112 TABLET ORAL at 07:47

## 2024-04-20 RX ADMIN — METOCLOPRAMIDE HYDROCHLORIDE 10 MG: 5 SOLUTION ORAL at 07:46

## 2024-04-20 RX ADMIN — PIPERACILLIN AND TAZOBACTAM 3.38 G: 3; .375 INJECTION, POWDER, FOR SOLUTION INTRAVENOUS at 00:34

## 2024-04-20 RX ADMIN — PIPERACILLIN AND TAZOBACTAM 3.38 G: 3; .375 INJECTION, POWDER, FOR SOLUTION INTRAVENOUS at 12:01

## 2024-04-20 RX ADMIN — POTASSIUM & SODIUM PHOSPHATES POWDER PACK 280-160-250 MG 1 PACKET: 280-160-250 PACK at 07:47

## 2024-04-20 RX ADMIN — CARBAMAZEPINE 150 MG: 100 SUSPENSION ORAL at 06:40

## 2024-04-20 RX ADMIN — ENOXAPARIN SODIUM 40 MG: 40 INJECTION SUBCUTANEOUS at 07:47

## 2024-04-20 RX ADMIN — BRIVARACETAM 100 MG: 10 SOLUTION ORAL at 07:47

## 2024-04-20 RX ADMIN — CARBAMAZEPINE 150 MG: 100 SUSPENSION ORAL at 12:03

## 2024-04-20 ASSESSMENT — ACTIVITIES OF DAILY LIVING (ADL)
ADLS_ACUITY_SCORE: 75
ADLS_ACUITY_SCORE: 77
ADLS_ACUITY_SCORE: 77
ADLS_ACUITY_SCORE: 75
ADLS_ACUITY_SCORE: 79
ADLS_ACUITY_SCORE: 75
ADLS_ACUITY_SCORE: 79
ADLS_ACUITY_SCORE: 77
ADLS_ACUITY_SCORE: 75
ADLS_ACUITY_SCORE: 75

## 2024-04-20 NOTE — DISCHARGE SUMMARY
[facility]    Discharge Summary  Hospitalist    Date of Admission:  4/17/2024  Date of Discharge:  4/20/2024  Discharging Provider: Meilsa Ash MD    Discharge Diagnoses   Aspiration pneumonia    History of Present Illness   Please review admission history and physical.    Hospital Course   Keyon Farias was admitted on 4/17/2024.  The following problems were addressed during his hospitalization:    Active Problems:    Altered mental status, unspecified altered mental status type    Community acquired pneumonia of right lower lobe of lung  Keyon Farias is a 63 year old male with a past medical history of TBI with right-sided spastic hemiaplasia, nonverbal, dysphagia, panhypopituitaryism, and seizures presents to hospital with altered mental status secondary to aspiration pneumonia.     Acute metabolic encephalopathy  Aspiration pneumonia  Chronic dysphagia status post PEG tube  Patient presenting with altered mental status and increased lethargy for the last 2 days.  Workup in the emergency room significant for mild leukocytosis and a new right lung base opacity.  Overall presentation is concerning for an aspiration pneumonia.  He was started on Zosyn in emergency room.Patient was quite sleepy on admission, more awake now, received Zosyn during the stay, will transition to Augmentin on discharge., continue strict n.p.o. status, tube feeds have been restarted, blood cultures 1/2 positive for coag negative staph,  possibly contaminant, repeat blood cultures ordered, not done .  Fluids stopped day prior to discharge .     History of TBI  Spastic hemiaplagia and right sided paresis  Hx of seizures  The patient is nonverbal at baseline and communicating by her thumbs up for yes and thumbs down for now.  He is fully dependent on his mother for cares.  PTA medications were continued during the stay.     Panhypopituitarism  -resume pta meds .  -Hydrocortisone started based on previous dose      GERD.    -Continue home medications       Melisa Ash MD    Significant Results and Procedures       Pending Results   These results will be followed up by primary care physician   Unresulted Labs Ordered in the Past 30 Days of this Admission       Date and Time Order Name Status Description    4/17/2024 10:29 PM Blood Culture Peripheral Blood Preliminary     4/17/2024  8:57 PM Blood Culture Peripheral Blood Preliminary             Code Status   Full Code       Primary Care Physician   Elsa Queen    Physical Exam   Temp: 97.8  F (36.6  C) Temp src: Oral BP: 109/43 Pulse: 62   Resp: 16 SpO2: 94 % O2 Device: None (Room air)    Vitals:    04/18/24 0956 04/20/24 0654   Weight: 71.2 kg (157 lb) 71.2 kg (156 lb 15.6 oz)     Vital Signs with Ranges  Temp:  [97.8  F (36.6  C)-98  F (36.7  C)] 97.8  F (36.6  C)  Pulse:  [62-72] 62  Resp:  [16] 16  BP: (109-127)/(43-56) 109/43  SpO2:  [94 %-97 %] 94 %  I/O last 3 completed shifts:  In: 4528 [I.V.:4528]  Out: 3300 [Urine:3300]    The patient was examined on the day of discharge.    Discharge Disposition   Discharged to home  Condition at discharge: Stable    Consultations This Hospital Stay   NUTRITION SERVICES ADULT IP CONSULT  PHARMACY IP CONSULT  CARE MANAGEMENT / SOCIAL WORK IP CONSULT    Time Spent on this Encounter   I, Melisa Ash MD, personally saw the patient today and spent greater than 30 minutes discharging this patient.    Discharge Orders      Primary Care - Care Coordination Referral      Reason for your hospital stay    Aspiration pneumonia     Follow-up and recommended labs and tests     Follow up with primary care provider, Elsa Queen, within 7 days for hospital follow- up.  No follow up labs or test are needed.     Activity    Your activity upon discharge: activity as tolerated     Resume Home Care Services     Diet    Follow this diet upon discharge: Orders Placed This Encounter      Adult Formula Drip Feeding: Continuous Jevity 1.5;  Jejunostomy; Goal Rate: 55 x 22 hrs; mL/hr; hold TF for 1 hr before and 1hr after Synthroid dose      NPO for Medical/Clinical Reasons Except for: NPO but receiving Tube Feeding     Discharge Medications   Current Discharge Medication List        START taking these medications    Details   amoxicillin-clavulanate (AUGMENTIN) 875-125 MG tablet Take 1 tablet by mouth 2 times daily for 8 days  Qty: 16 tablet, Refills: 0    Associated Diagnoses: Aspiration pneumonia of right middle lobe, unspecified aspiration pneumonia type (H)           CONTINUE these medications which have NOT CHANGED    Details   acetaminophen (TYLENOL) 325 MG tablet Take 650 mg by mouth every 6 hours as needed for mild pain      albuterol (PROVENTIL) (5 MG/ML) 0.5% neb solution Take 0.5 mLs (2.5 mg) by nebulization every 6 hours as needed for shortness of breath, wheezing or cough 0700 1100 1500 1900 with mucomyst  Qty: 240 mL, Refills: 3    Associated Diagnoses: Aspiration pneumonitis (H)      bacitracin 500 UNIT/GM external ointment Apply topically daily as needed for wound care To PEG site.  Qty: 30 g, Refills: 3    Associated Diagnoses: G tube feedings (H)      Brivaracetam (BRIVIACT) 10 MG/ML solution 100 mg by Oral or Feeding Tube route 2 times daily 0900, 2100      Calcium-Magnesium-Zinc 333-133-5 MG TABS per tablet Take 1 tablet by mouth daily      !! 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 carBAMazepine (TEGRETOL) 100 MG/5ML suspension 100 mg by Per Feeding Tube route daily Take at 1800      COMPOUNDED NON-CONTROLLED SUBSTANCE (CMPD RX) - PHARMACY TO MIX COMPOUNDED MEDICATION Scopolamine 0.4mg capsules - take  2 capsule by feeding tube three times daily as needed  Qty: 90 capsule, Refills: 1    Associated  Diagnoses: Excessive oral secretions      Cyanocobalamin (VITAMIN B-12 PO) 1,000 mcg by Per Feeding Tube route daily      glycopyrrolate (ROBINUL) 1 MG tablet TAKE 1 TABLET(1 MG) BY MOUTH TWICE DAILY AS NEEDED FOR SECRETIONS  Qty: 180 tablet, Refills: 3    Associated Diagnoses: Excessive oral secretions      guaiFENesin (MUCINEX) 600 MG 12 hr tablet Take 1 tablet (600 mg) by mouth 2 times daily as needed for congestion  Qty: 60 tablet, Refills: 0    Associated Diagnoses: Aspiration pneumonitis (H)      hydrocortisone (CORTAID) 1 % external cream Apply topically 2 times daily as needed Apply to reddened memo areas as needed      hydrocortisone (CORTEF) 5 MG tablet Take 15 mg (3 tablets) in the morning and 7.5 mg (1.5 tablet)  at 2:00 PM. During illness patient takes more as a stress dose. Please increase the dose as directed.  Qty: 400 tablet, Refills: 3    Associated Diagnoses: Secondary adrenal insufficiency (H24)      metoclopramide (REGLAN) 10 MG/10ML SOLN solution Take 10 mLs (10 mg) by mouth 4 times daily (before meals and nightly) 0800, 1200, 1600, 2000 Disconnects bag before administration, then waits 45 mins before reconnecting after giving the medication  Qty: 2365 mL, Refills: 5    Associated Diagnoses: Aspiration pneumonitis (H)      miconazole (MICATIN) 2 % external powder Apply topically 2 times daily as needed    Associated Diagnoses: Recurrent pneumonia      multivitamin, therapeutic (THERA-VIT) TABS tablet 1 tablet by Per Feeding Tube route daily      pantoprazole (PROTONIX) 2 mg/mL SUSP suspension TAKE 20ML PER FEEDING TUBE DAILY  Qty: 600 mL, Refills: 3    Associated Diagnoses: Gastroesophageal reflux disease      potassium & sodium phosphates (NEUTRA-PHOS) 280-160-250 MG Packet Take 1 packet by mouth daily  Qty: 100 each, Refills: 3    Associated Diagnoses: Other feeding difficulties; Malnutrition, unspecified type (H24)      testosterone cypionate (DEPOTESTOSTERONE) 200 MG/ML injection INJECT  "0.25ML IN THE MUSCLE ONCE A WEEK. DISCARD REMAINDER OF VIAL  Qty: 4 mL, Refills: 5    Associated Diagnoses: Panhypopituitarism (H24); Hypogonadism male      UNABLE TO FIND Take 0.125 teaspoonful by mouth 2 times daily MEDICATION NAME: pink salt 1/8 teaspoon po bid (instead of sodium bicarb).      vitamin C (ASCORBIC ACID) 1000 MG TABS 1,000 mg by Oral or Feeding Tube route daily       vitamin D3 (CHOLECALCIFEROL) 2000 units (50 mcg) tablet 4,000 Units by Per Feeding Tube route daily      insulin syringe-needle U-100 (29G X 1/2\" 1 ML) 29G X 1/2\" 1 ML miscellaneous Syringe needle use as needed  Qty: 200 each, Refills: 11    Associated Diagnoses: Panhypopituitarism (H24)      levothyroxine (SYNTHROID/LEVOTHROID) 112 MCG tablet TAKE 1 TABLET(112 MCG) BY MOUTH DAILY  Qty: 90 tablet, Refills: 0    Associated Diagnoses: Hypothyroidism, unspecified type      mupirocin (BACTROBAN) 2 % external ointment APPLY TOPICALLY TO THE AFFECTED AREA TWICE DAILY AS NEEDED  Qty: 30 g, Refills: 1    Associated Diagnoses: Panhypopituitarism (H24); Hypogonadism male      Nutritional Supplements (BOOST HIGH PROTEIN) LIQD       sodium bicarbonate 325 MG tablet 1 tablet (325 mg) by Per J Tube route daily  Qty: 90 tablet, Refills: 3    Associated Diagnoses: Malnutrition, unspecified type (H24); Necrotizing pancreatitis       !! - Potential duplicate medications found. Please discuss with provider.        Allergies   Allergies   Allergen Reactions    Phenytoin Sodium Other (See Comments)     Shaking violently   Tremors    Valproic Acid Other (See Comments)     Toxicity w/ bone marrow suspension, elevated ammonia levels     Scopolamine Hives     Hives with the patch - oral no problem    Vancomycin Other (See Comments)     Vancomycin flushing syndrome - pt tolerated dose at half rate.     Data   Most Recent 3 CBC's:  Recent Labs   Lab Test 04/18/24  0926 04/17/24 2010 03/22/24  1128   WBC 8.6 11.4* 10.8   HGB 13.8 16.1 13.9   MCV 85 84 86   PLT " 188 222 216      Most Recent 3 BMP's:  Recent Labs   Lab Test 04/18/24  1137 04/18/24  0926 04/17/24 2010 04/17/24  1918 03/26/24  0855 03/25/24  1033   NA  --  138 135  --   --  139   POTASSIUM  --  3.8 4.1  --   --  4.1   CHLORIDE  --  97* 92*  --   --  103   CO2  --  31* 33*  --   --  25   BUN  --  20.2 23.1*  --   --  13.6   CR  --  1.18* 1.16  --  0.79 0.94   ANIONGAP  --  10 10  --   --  11   STEVE  --  8.4* 9.6  --   --  8.4*   GLC 92 93 119*   < >  --  104*    < > = values in this interval not displayed.     Most Recent 2 LFT's:  Recent Labs   Lab Test 04/17/24 2010 03/21/24  1822 01/21/24  1618   AST 27  --  49*   ALT 22  --  33   ALKPHOS 100  --  110   BILITOTAL 0.3 0.3 0.3     Most Recent INR's and Anticoagulation Dosing History:  Anticoagulation Dose History  More data exists         Latest Ref Rng & Units 10/25/2020 10/26/2020 12/5/2021 12/27/2021 6/16/2022 7/5/2022 9/25/2023   Recent Dosing and Labs   INR 0.85 - 1.15 1.86  1.89  1.82  1.28  1.28  1.22  1.15  1.17      Most Recent 3 Troponin's:  Recent Labs   Lab Test 04/15/21  2250 02/19/21  1123 10/25/20  2100   TROPI <0.015 <0.015 0.047*     Most Recent Cholesterol Panel:  Recent Labs   Lab Test 10/31/23  1300   CHOL 152   LDL 79   HDL 28*   TRIG 225*     Most Recent 6 Bacteria Isolates From Any Culture (See EPIC Reports for Culture Details):  Recent Labs   Lab Test 05/23/21  0316 05/23/21  0250 04/15/21  2250 02/22/21  1622 02/22/21  1439 02/22/21  1413   CULT No growth No growth No growth No growth No growth No growth     Most Recent TSH, T4 and A1c Labs:  Recent Labs   Lab Test 01/29/24  1715 01/11/24  1513 10/31/23  1300 09/26/23  0542   TSH <0.01* <0.01*   < >  --    T4  --  1.25  --   --    A1C  --   --   --  5.9*    < > = values in this interval not displayed.     Results for orders placed or performed during the hospital encounter of 04/17/24   XR Chest 2 Views    Narrative    EXAM: XR CHEST 2 VIEWS  LOCATION: [facility]OSPITAL  DATE: 4/17/2024    INDICATION: Cough. Evaluate for pneumonia.  COMPARISON: Chest radiograph 03/21/2024.      Impression    IMPRESSION:     Lung volumes are low with a chronic mildly elevated right hemidiaphragm. An airspace opacity at the medial right lung base could either reflect atelectasis or infection. Left lung is clear. No pleural effusions or pneumothorax. Normal pulmonary   vascularity. Stable, nonenlarged cardiac silhouette.   CT Head w/o Contrast    Narrative    EXAM: CT HEAD W/O CONTRAST  LOCATION: [facility]  DATE: 4/17/2024    INDICATION: Confusion, history of prior TBI CVA  COMPARISON: 09/25/2023  TECHNIQUE: Routine CT Head without IV contrast. Multiplanar reformats. Dose reduction techniques were used.    FINDINGS:  INTRACRANIAL CONTENTS: Large area of chronic encephalomalacia centered in the left temporoparietal region. Mild areas of chronic encephalomalacia anterior aspect of both frontal lobes. No CT evidence of acute infarct. Mild presumed chronic small vessel   ischemic changes. Moderate to marked enlargement of ventricular system, similar to prior. Moderate atrophy.     VISUALIZED ORBITS/SINUSES/MASTOIDS: No intraorbital abnormality. Moderate mucosal thickening with fluid sphenoid sinuses. Mild mucosal changes remaining paranasal sinuses No middle ear or mastoid effusion.    BONES/SOFT TISSUES: No acute abnormality.      Impression    IMPRESSION:  1.  No significant change.     *Note: Due to a large number of results and/or encounters for the requested time period, some results have not been displayed. A complete set of results can be found in Results Review.

## 2024-04-20 NOTE — PROGRESS NOTES
Care Management Discharge Note    Discharge Date: 04/20/2024     Discharge Disposition: Home, Home Infusion    Discharge Services: County Worker, PCA    Discharge DME: None    Discharge Transportation: health plan transportation (Stretcher ride)    Private pay costs discussed: Not applicable    Does the patient's insurance plan have a 3 day qualifying hospital stay waiver?  No    PAS Confirmation Code:  N/A  Patient/family educated on Medicare website which has current facility and service quality ratings:      Education Provided on the Discharge Plan:    Persons Notified of Discharge Plans: Patient's mother/guardian  Patient/Family in Agreement with the Plan: yes    Handoff Referral Completed: No    Additional Information:  Patient to discharge to home today via [facility] with a  window of 12:38 - 1:23.  notified patient's mother/guardian of discharge to home. She is in agreement. Requesting update regarding discharge from bedside nurse. Also inquired about a silver ring that patient is missing.  notified bedside nurse. PCS form completed, faxed to  and provided to [facility].    DEJA Perez

## 2024-04-20 NOTE — PLAN OF CARE
[id number] - [id number]    Orientation: HECTOR - nonverbal    Activity: A2 w/lift, T/R q 2 hrs    Diet/BS Checks: NPO, TF running through g-tube @ 55 mL/hr with 60 mL FWF q     Tele: N/A    IV Access/Drains: L PIV infusing NS @ 100 mL/hr with intermittent abx    Pain Management: Appears comfortable    Abnormal VS/Results: VSS on RA    Bowel/Bladder: Incontinent, x1 BM overnight, external cath in place    Skin/Wounds: Excoriated, reddened coccyx - mepilex changed, scattered abrasions and scabs    Consults: Care coordinator, Nutrition    D/C Disposition: Possibly today

## 2024-04-20 NOTE — PROGRESS NOTES
Writer called security regarding missing ring from last admission. No ring was located. Writer notified patient's mother Savannah and gave her Patient Relations phone number.

## 2024-04-20 NOTE — PROGRESS NOTES
Discharge Note    Patient discharged to home via EMS/BLS accompanied by no family/friend .  IV: Discontinued  Prescriptions filled and given to patient/family.   Belongings reviewed and sent with patient.   Home medications returned to patient: N/A  Equipment sent with: N/A.   family verbalizes understanding of discharge instructions. AVS reviewed with patient's mother Savannah via telephone.

## 2024-04-21 LAB
BACTERIA BLD CULT: ABNORMAL

## 2024-04-22 ENCOUNTER — PATIENT OUTREACH (OUTPATIENT)
Dept: CARE COORDINATION | Facility: CLINIC | Age: 64
End: 2024-04-22
Payer: MEDICARE

## 2024-04-22 DIAGNOSIS — Z09 HOSPITAL DISCHARGE FOLLOW-UP: ICD-10-CM

## 2024-04-22 ASSESSMENT — ACTIVITIES OF DAILY LIVING (ADL): DEPENDENT_IADLS:: MEAL PREPARATION;MEDICATION MANAGEMENT;TRANSPORTATION

## 2024-04-22 NOTE — PROGRESS NOTES
Clinic Care Coordination Contact  [facility]/Voicemail    Clinical Data: Care Coordinator Outreach    Outreach Documentation Number of Outreach Attempt   4/22/2024  11:06 AM 1     Left message on patient's motherSavannah's voicemail with call back information and requested return call.    Plan: Care Coordinator will try to reach patient again in 1-2 business days.     Heather Hong RN, BSN, PHN  Primary Care / Care Coordinator   [facility]  E-mail [email or web address]   [phone]

## 2024-04-23 ENCOUNTER — TELEPHONE (OUTPATIENT)
Dept: FAMILY MEDICINE | Facility: CLINIC | Age: 64
End: 2024-04-23
Payer: MEDICARE

## 2024-04-23 ENCOUNTER — PATIENT OUTREACH (OUTPATIENT)
Dept: CARE COORDINATION | Facility: CLINIC | Age: 64
End: 2024-04-23
Payer: MEDICARE

## 2024-04-23 LAB — BACTERIA BLD CULT: NO GROWTH

## 2024-04-23 ASSESSMENT — ACTIVITIES OF DAILY LIVING (ADL): DEPENDENT_IADLS:: MEAL PREPARATION;MEDICATION MANAGEMENT;TRANSPORTATION

## 2024-04-23 NOTE — PROGRESS NOTES
Clinic Care Coordination Contact  [facility]/Voicemail    Clinical Data: Care Coordinator Outreach    Outreach Documentation Number of Outreach Attempt   4/22/2024  11:06 AM 1   4/23/2024  11:29 AM 2     Left message on patient's, mothers voicemail with call back information and requested return call.    Plan: Care Coordinator will send care coordination introduction letter with care coordinator contact information and explanation of care coordination services via mail. Care Coordinator will do no further outreaches at this time.     Heather Hong RN, BSN, PHN  Primary Care / Care Coordinator   [facility]  E-mail [email or web address]   [phone]

## 2024-04-23 NOTE — TELEPHONE ENCOUNTER
MTM referral from: Transitions of Care (recent hospital discharge or ED visit)    MTM referral outreach attempt #1 on April 23, 2024 at 10:48 AM      Outcome: Spoke with patient's mom, she declined services    Use vbc (symone) for the carrier/Plan on the flowsheet          Diya Cisneros CPhT  MTM

## 2024-04-23 NOTE — LETTER
[facility] CARE COORDINATION  [address] [place] [address]    April 23, 2024    Keyon GLADYS Farias  [address] [place] [address]      Dear Keyon,    I am a clinic care coordinator who works with Elsa Queen MD with the [facility]. I recently tried to call and was unable to reach you. Below is a description of clinic care coordination and how I can further assist you.       The clinic care coordination team is made up of a registered nurse, , financial resource worker and community health worker who understand the health care system. The goal of clinic care coordination is to help you manage your health and improve access to the health care system. Our team works alongside your provider to assist you in determining your health and social needs. We can help you obtain health care and community resources, providing you with necessary information and education. We can work with you through any barriers and develop a care plan that helps coordinate and strengthen the communication between you and your care team.  Our services are voluntary and are offered without charge to you personally.    Please feel free to contact me with any questions or concerns regarding care coordination and what we can offer.      We are focused on providing you with the highest-quality healthcare experience possible.    Sincerely,      Heather Hong RN, BSN, PHN  Primary Care / Care Coordinator   [facility]  E-mail [email or web address]   [phone]

## 2024-04-25 ENCOUNTER — TELEPHONE (OUTPATIENT)
Dept: FAMILY MEDICINE | Facility: CLINIC | Age: 64
End: 2024-04-25
Payer: MEDICARE

## 2024-04-25 NOTE — TELEPHONE ENCOUNTER
Pt's mother Savannah is requesting DME order for a feeding pump. Pt has one now but is malfunctioning. Mom wants order sent to [facility] attn: Kimberlee.     [facility] medical care team can be contacted at [phone].    Triage called [facility] requesting written order with details of feeding pump pt has been using for PCP to sign.     [facility] medical care team agent agreed to generate order and fax to PCP for signature today.

## 2024-04-25 NOTE — TELEPHONE ENCOUNTER
Written order for feeding pump was received.  Form was signed by JASON Angel [facility] fax #[phone].     [facility] form and sent to medical records to be scanned.     Pt's mother was informed this was done.

## 2024-04-26 ENCOUNTER — MEDICAL CORRESPONDENCE (OUTPATIENT)
Dept: HEALTH INFORMATION MANAGEMENT | Facility: CLINIC | Age: 64
End: 2024-04-26

## 2024-04-26 NOTE — TELEPHONE ENCOUNTER
Elizabet with [facility] 2 forms were faxed to clinic yesterday. First form was an incorrect form, Elizabet calling to see if 2nd form can be completed and faxed over this morning - patient is in need of replacement pump asap.     Elizabet can be reached directly at [phone] if any questions.

## 2024-04-27 ENCOUNTER — APPOINTMENT (OUTPATIENT)
Dept: CT IMAGING | Facility: CLINIC | Age: 64
DRG: 871 | End: 2024-04-27
Attending: INTERNAL MEDICINE
Payer: MEDICARE

## 2024-04-27 ENCOUNTER — HOSPITAL ENCOUNTER (INPATIENT)
Facility: CLINIC | Age: 64
LOS: 8 days | Discharge: HOME HEALTH | DRG: 871 | End: 2024-05-05
Attending: EMERGENCY MEDICINE | Admitting: INTERNAL MEDICINE
Payer: MEDICARE

## 2024-04-27 ENCOUNTER — APPOINTMENT (OUTPATIENT)
Dept: CT IMAGING | Facility: CLINIC | Age: 64
DRG: 871 | End: 2024-04-27
Attending: EMERGENCY MEDICINE
Payer: MEDICARE

## 2024-04-27 ENCOUNTER — APPOINTMENT (OUTPATIENT)
Dept: GENERAL RADIOLOGY | Facility: CLINIC | Age: 64
DRG: 871 | End: 2024-04-27
Attending: SURGERY
Payer: MEDICARE

## 2024-04-27 DIAGNOSIS — A41.9 SEPSIS (H): ICD-10-CM

## 2024-04-27 DIAGNOSIS — A41.9 SEPSIS DUE TO PNEUMONIA (H): ICD-10-CM

## 2024-04-27 DIAGNOSIS — J18.9 PNEUMONIA OF BOTH LOWER LOBES DUE TO INFECTIOUS ORGANISM: ICD-10-CM

## 2024-04-27 DIAGNOSIS — E23.0 PANHYPOPITUITARISM (H): ICD-10-CM

## 2024-04-27 DIAGNOSIS — F34.1 PERSISTENT DEPRESSIVE DISORDER: Primary | ICD-10-CM

## 2024-04-27 DIAGNOSIS — Z99.3 WHEELCHAIR DEPENDENCE: ICD-10-CM

## 2024-04-27 DIAGNOSIS — J18.9 SEPSIS DUE TO PNEUMONIA (H): ICD-10-CM

## 2024-04-27 LAB
ALBUMIN SERPL BCG-MCNC: 4 G/DL (ref 3.5–5.2)
ALBUMIN UR-MCNC: 30 MG/DL
ALP SERPL-CCNC: 87 U/L (ref 40–150)
ALT SERPL W P-5'-P-CCNC: 24 U/L (ref 0–70)
ANION GAP SERPL CALCULATED.3IONS-SCNC: 11 MMOL/L (ref 7–15)
ANION GAP SERPL CALCULATED.3IONS-SCNC: 14 MMOL/L (ref 7–15)
APPEARANCE UR: CLEAR
AST SERPL W P-5'-P-CCNC: 28 U/L (ref 0–45)
ATRIAL RATE - MUSE: 123 BPM
BASE EXCESS BLDV CALC-SCNC: 1.4 MMOL/L (ref -3–3)
BASE EXCESS BLDV CALC-SCNC: 6 MMOL/L (ref -3–3)
BASOPHILS # BLD AUTO: 0.1 10E3/UL (ref 0–0.2)
BASOPHILS NFR BLD AUTO: 1 %
BILIRUB SERPL-MCNC: 0.4 MG/DL
BILIRUB UR QL STRIP: NEGATIVE
BUN SERPL-MCNC: 19.9 MG/DL (ref 8–23)
BUN SERPL-MCNC: 23.1 MG/DL (ref 8–23)
CALCIUM SERPL-MCNC: 7.9 MG/DL (ref 8.8–10.2)
CALCIUM SERPL-MCNC: 9.4 MG/DL (ref 8.8–10.2)
CHLORIDE SERPL-SCNC: 104 MMOL/L (ref 98–107)
CHLORIDE SERPL-SCNC: 108 MMOL/L (ref 98–107)
COLOR UR AUTO: YELLOW
CREAT SERPL-MCNC: 1.11 MG/DL (ref 0.67–1.17)
CREAT SERPL-MCNC: 1.11 MG/DL (ref 0.67–1.17)
CREAT SERPL-MCNC: 1.25 MG/DL (ref 0.67–1.17)
DEPRECATED HCO3 PLAS-SCNC: 23 MMOL/L (ref 22–29)
DEPRECATED HCO3 PLAS-SCNC: 28 MMOL/L (ref 22–29)
DIASTOLIC BLOOD PRESSURE - MUSE: NORMAL MMHG
EGFRCR SERPLBLD CKD-EPI 2021: 66 ML/MIN/1.73M2
EGFRCR SERPLBLD CKD-EPI 2021: 76 ML/MIN/1.73M2
EGFRCR SERPLBLD CKD-EPI 2021: 76 ML/MIN/1.73M2
EOSINOPHIL # BLD AUTO: 0.4 10E3/UL (ref 0–0.7)
EOSINOPHIL NFR BLD AUTO: 3 %
ERYTHROCYTE [DISTWIDTH] IN BLOOD BY AUTOMATED COUNT: 15.4 % (ref 10–15)
FLUAV RNA SPEC QL NAA+PROBE: NEGATIVE
FLUBV RNA RESP QL NAA+PROBE: NEGATIVE
GLUCOSE BLDC GLUCOMTR-MCNC: 139 MG/DL (ref 70–99)
GLUCOSE BLDC GLUCOMTR-MCNC: 151 MG/DL (ref 70–99)
GLUCOSE SERPL-MCNC: 108 MG/DL (ref 70–99)
GLUCOSE SERPL-MCNC: 133 MG/DL (ref 70–99)
GLUCOSE UR STRIP-MCNC: NEGATIVE MG/DL
HCO3 BLDV-SCNC: 28 MMOL/L (ref 21–28)
HCO3 BLDV-SCNC: 33 MMOL/L (ref 21–28)
HCT VFR BLD AUTO: 50.9 % (ref 40–53)
HGB BLD-MCNC: 16.2 G/DL (ref 13.3–17.7)
HGB UR QL STRIP: NEGATIVE
HOLD SPECIMEN: NORMAL
HOLD SPECIMEN: NORMAL
IMM GRANULOCYTES # BLD: 0.1 10E3/UL
IMM GRANULOCYTES NFR BLD: 0 %
INTERPRETATION ECG - MUSE: NORMAL
KETONES UR STRIP-MCNC: NEGATIVE MG/DL
LACTATE BLD-SCNC: 2.2 MMOL/L
LACTATE SERPL-SCNC: 2.9 MMOL/L (ref 0.7–2)
LACTATE SERPL-SCNC: 3 MMOL/L (ref 0.7–2)
LACTATE SERPL-SCNC: 3.6 MMOL/L (ref 0.7–2)
LEUKOCYTE ESTERASE UR QL STRIP: NEGATIVE
LYMPHOCYTES # BLD AUTO: 4.3 10E3/UL (ref 0.8–5.3)
LYMPHOCYTES NFR BLD AUTO: 28 %
MCH RBC QN AUTO: 27 PG (ref 26.5–33)
MCHC RBC AUTO-ENTMCNC: 31.8 G/DL (ref 31.5–36.5)
MCV RBC AUTO: 85 FL (ref 78–100)
MONOCYTES # BLD AUTO: 1.3 10E3/UL (ref 0–1.3)
MONOCYTES NFR BLD AUTO: 8 %
MUCOUS THREADS #/AREA URNS LPF: PRESENT /LPF
NEUTROPHILS # BLD AUTO: 8.9 10E3/UL (ref 1.6–8.3)
NEUTROPHILS NFR BLD AUTO: 60 %
NITRATE UR QL: NEGATIVE
NRBC # BLD AUTO: 0 10E3/UL
NRBC BLD AUTO-RTO: 0 /100
O2/TOTAL GAS SETTING VFR VENT: 0 %
OXYHGB MFR BLDV: 30 % (ref 70–75)
P AXIS - MUSE: 63 DEGREES
PCO2 BLDV: 52 MM HG (ref 40–50)
PCO2 BLDV: 57 MM HG (ref 40–50)
PH BLDV: 7.34 [PH] (ref 7.32–7.43)
PH BLDV: 7.37 [PH] (ref 7.32–7.43)
PH UR STRIP: 8 [PH] (ref 5–7)
PHOSPHATE SERPL-MCNC: 2.5 MG/DL (ref 2.5–4.5)
PLATELET # BLD AUTO: 275 10E3/UL (ref 150–450)
PO2 BLDV: 19 MM HG (ref 25–47)
PO2 BLDV: 22 MM HG (ref 25–47)
POTASSIUM SERPL-SCNC: 3.7 MMOL/L (ref 3.4–5.3)
POTASSIUM SERPL-SCNC: 3.9 MMOL/L (ref 3.4–5.3)
PR INTERVAL - MUSE: 144 MS
PROT SERPL-MCNC: 8.5 G/DL (ref 6.4–8.3)
QRS DURATION - MUSE: 80 MS
QT - MUSE: 310 MS
QTC - MUSE: 443 MS
R AXIS - MUSE: -71 DEGREES
RBC # BLD AUTO: 6.01 10E6/UL (ref 4.4–5.9)
RBC URINE: 2 /HPF
RSV RNA SPEC NAA+PROBE: NEGATIVE
SAO2 % BLDV: 25 % (ref 70–75)
SAO2 % BLDV: 31 % (ref 70–75)
SARS-COV-2 RNA RESP QL NAA+PROBE: NEGATIVE
SODIUM SERPL-SCNC: 142 MMOL/L (ref 135–145)
SODIUM SERPL-SCNC: 146 MMOL/L (ref 135–145)
SP GR UR STRIP: 1.03 (ref 1–1.03)
SYSTOLIC BLOOD PRESSURE - MUSE: NORMAL MMHG
T AXIS - MUSE: 78 DEGREES
UROBILINOGEN UR STRIP-MCNC: 2 MG/DL
VENTRICULAR RATE- MUSE: 123 BPM
WBC # BLD AUTO: 15 10E3/UL (ref 4–11)
WBC URINE: <1 /HPF

## 2024-04-27 PROCEDURE — 93005 ELECTROCARDIOGRAM TRACING: CPT

## 2024-04-27 PROCEDURE — 82803 BLOOD GASES ANY COMBINATION: CPT

## 2024-04-27 PROCEDURE — 93010 ELECTROCARDIOGRAM REPORT: CPT | Performed by: INTERNAL MEDICINE

## 2024-04-27 PROCEDURE — 96365 THER/PROPH/DIAG IV INF INIT: CPT | Mod: 59

## 2024-04-27 PROCEDURE — 82805 BLOOD GASES W/O2 SATURATION: CPT | Performed by: PHYSICIAN ASSISTANT

## 2024-04-27 PROCEDURE — 99223 1ST HOSP IP/OBS HIGH 75: CPT | Mod: 25 | Performed by: INTERNAL MEDICINE

## 2024-04-27 PROCEDURE — 83605 ASSAY OF LACTIC ACID: CPT

## 2024-04-27 PROCEDURE — 258N000003 HC RX IP 258 OP 636: Performed by: EMERGENCY MEDICINE

## 2024-04-27 PROCEDURE — 99291 CRITICAL CARE FIRST HOUR: CPT | Performed by: PHYSICIAN ASSISTANT

## 2024-04-27 PROCEDURE — 0042T CT HEAD PERFUSION W CONTRAST: CPT

## 2024-04-27 PROCEDURE — 999N000040 HC STATISTIC CONSULT NO CHARGE VASC ACCESS

## 2024-04-27 PROCEDURE — 36415 COLL VENOUS BLD VENIPUNCTURE: CPT | Performed by: PHYSICIAN ASSISTANT

## 2024-04-27 PROCEDURE — 250N000013 HC RX MED GY IP 250 OP 250 PS 637: Performed by: EMERGENCY MEDICINE

## 2024-04-27 PROCEDURE — 84100 ASSAY OF PHOSPHORUS: CPT | Performed by: INTERNAL MEDICINE

## 2024-04-27 PROCEDURE — 99292 CRITICAL CARE ADDL 30 MIN: CPT

## 2024-04-27 PROCEDURE — 250N000009 HC RX 250: Performed by: SURGERY

## 2024-04-27 PROCEDURE — 200N000001 HC R&B ICU

## 2024-04-27 PROCEDURE — 250N000011 HC RX IP 250 OP 636

## 2024-04-27 PROCEDURE — 250N000011 HC RX IP 250 OP 636: Performed by: INTERNAL MEDICINE

## 2024-04-27 PROCEDURE — 87149 DNA/RNA DIRECT PROBE: CPT | Performed by: EMERGENCY MEDICINE

## 2024-04-27 PROCEDURE — 51798 US URINE CAPACITY MEASURE: CPT

## 2024-04-27 PROCEDURE — 250N000011 HC RX IP 250 OP 636: Mod: JZ | Performed by: EMERGENCY MEDICINE

## 2024-04-27 PROCEDURE — 258N000003 HC RX IP 258 OP 636

## 2024-04-27 PROCEDURE — 36415 COLL VENOUS BLD VENIPUNCTURE: CPT

## 2024-04-27 PROCEDURE — 36556 INSERT NON-TUNNEL CV CATH: CPT | Performed by: SURGERY

## 2024-04-27 PROCEDURE — 99291 CRITICAL CARE FIRST HOUR: CPT | Mod: 25 | Performed by: INTERNAL MEDICINE

## 2024-04-27 PROCEDURE — 36415 COLL VENOUS BLD VENIPUNCTURE: CPT | Performed by: EMERGENCY MEDICINE

## 2024-04-27 PROCEDURE — 96361 HYDRATE IV INFUSION ADD-ON: CPT

## 2024-04-27 PROCEDURE — 87186 SC STD MICRODIL/AGAR DIL: CPT | Performed by: EMERGENCY MEDICINE

## 2024-04-27 PROCEDURE — 81001 URINALYSIS AUTO W/SCOPE: CPT | Performed by: EMERGENCY MEDICINE

## 2024-04-27 PROCEDURE — 83735 ASSAY OF MAGNESIUM: CPT | Performed by: HOSPITALIST

## 2024-04-27 PROCEDURE — 250N000011 HC RX IP 250 OP 636: Performed by: PHYSICIAN ASSISTANT

## 2024-04-27 PROCEDURE — 250N000009 HC RX 250: Performed by: INTERNAL MEDICINE

## 2024-04-27 PROCEDURE — 70496 CT ANGIOGRAPHY HEAD: CPT | Mod: MA

## 2024-04-27 PROCEDURE — 99418 PROLNG IP/OBS E/M EA 15 MIN: CPT | Mod: FS | Performed by: INTERNAL MEDICINE

## 2024-04-27 PROCEDURE — 99291 CRITICAL CARE FIRST HOUR: CPT | Mod: 25

## 2024-04-27 PROCEDURE — 80053 COMPREHEN METABOLIC PANEL: CPT | Performed by: EMERGENCY MEDICINE

## 2024-04-27 PROCEDURE — 36620 INSERTION CATHETER ARTERY: CPT | Performed by: SURGERY

## 2024-04-27 PROCEDURE — 99207 PR APP CREDIT; MD BILLING SHARED VISIT: CPT | Performed by: PHYSICIAN ASSISTANT

## 2024-04-27 PROCEDURE — 82310 ASSAY OF CALCIUM: CPT | Performed by: PHYSICIAN ASSISTANT

## 2024-04-27 PROCEDURE — 70450 CT HEAD/BRAIN W/O DYE: CPT | Mod: MA

## 2024-04-27 PROCEDURE — 250N000011 HC RX IP 250 OP 636: Performed by: EMERGENCY MEDICINE

## 2024-04-27 PROCEDURE — 71260 CT THORAX DX C+: CPT | Mod: MA

## 2024-04-27 PROCEDURE — 87637 SARSCOV2&INF A&B&RSV AMP PRB: CPT | Performed by: EMERGENCY MEDICINE

## 2024-04-27 PROCEDURE — 71045 X-RAY EXAM CHEST 1 VIEW: CPT

## 2024-04-27 PROCEDURE — 04HY32Z INSERTION OF MONITORING DEVICE INTO LOWER ARTERY, PERCUTANEOUS APPROACH: ICD-10-PCS | Performed by: SURGERY

## 2024-04-27 PROCEDURE — 99207 PR NO BILLABLE SERVICE THIS VISIT: CPT | Performed by: PHYSICIAN ASSISTANT

## 2024-04-27 PROCEDURE — 83605 ASSAY OF LACTIC ACID: CPT | Performed by: PHYSICIAN ASSISTANT

## 2024-04-27 PROCEDURE — 85025 COMPLETE CBC W/AUTO DIFF WBC: CPT | Performed by: EMERGENCY MEDICINE

## 2024-04-27 PROCEDURE — 04HK33Z INSERTION OF INFUSION DEVICE INTO RIGHT FEMORAL ARTERY, PERCUTANEOUS APPROACH: ICD-10-PCS | Performed by: SURGERY

## 2024-04-27 PROCEDURE — 250N000013 HC RX MED GY IP 250 OP 250 PS 637: Performed by: PHYSICIAN ASSISTANT

## 2024-04-27 PROCEDURE — 250N000011 HC RX IP 250 OP 636: Performed by: SURGERY

## 2024-04-27 PROCEDURE — 250N000009 HC RX 250: Performed by: EMERGENCY MEDICINE

## 2024-04-27 PROCEDURE — 82565 ASSAY OF CREATININE: CPT | Performed by: PHYSICIAN ASSISTANT

## 2024-04-27 RX ORDER — FENTANYL CITRATE 50 UG/ML
25 INJECTION, SOLUTION INTRAMUSCULAR; INTRAVENOUS ONCE
Status: COMPLETED | OUTPATIENT
Start: 2024-04-27 | End: 2024-04-27

## 2024-04-27 RX ORDER — ROPIVACAINE IN 0.9% SOD CHL/PF 0.1 %
.03-.125 PLASTIC BAG, INJECTION (ML) EPIDURAL CONTINUOUS
Status: DISCONTINUED | OUTPATIENT
Start: 2024-04-27 | End: 2024-04-27

## 2024-04-27 RX ORDER — ONDANSETRON 4 MG/1
4 TABLET, ORALLY DISINTEGRATING ORAL EVERY 6 HOURS PRN
Status: DISCONTINUED | OUTPATIENT
Start: 2024-04-27 | End: 2024-05-05 | Stop reason: HOSPADM

## 2024-04-27 RX ORDER — CARBAMAZEPINE 100 MG/5ML
150 SUSPENSION ORAL 3 TIMES DAILY
Status: DISCONTINUED | OUTPATIENT
Start: 2024-04-27 | End: 2024-05-05 | Stop reason: HOSPADM

## 2024-04-27 RX ORDER — ASCORBIC ACID 500 MG
1000 TABLET ORAL DAILY
Status: DISCONTINUED | OUTPATIENT
Start: 2024-04-28 | End: 2024-05-05 | Stop reason: HOSPADM

## 2024-04-27 RX ORDER — CEFEPIME HYDROCHLORIDE 2 G/1
2 INJECTION, POWDER, FOR SOLUTION INTRAVENOUS ONCE
Status: COMPLETED | OUTPATIENT
Start: 2024-04-27 | End: 2024-04-27

## 2024-04-27 RX ORDER — ENOXAPARIN SODIUM 100 MG/ML
40 INJECTION SUBCUTANEOUS EVERY 24 HOURS
Status: DISCONTINUED | OUTPATIENT
Start: 2024-04-27 | End: 2024-05-05 | Stop reason: HOSPADM

## 2024-04-27 RX ORDER — GLYCOPYRROLATE 1 MG/1
1 TABLET ORAL 2 TIMES DAILY PRN
Status: DISCONTINUED | OUTPATIENT
Start: 2024-04-27 | End: 2024-05-05 | Stop reason: HOSPADM

## 2024-04-27 RX ORDER — GUAIFENESIN 600 MG/1
15 TABLET, EXTENDED RELEASE ORAL DAILY
Status: DISCONTINUED | OUTPATIENT
Start: 2024-04-27 | End: 2024-05-05 | Stop reason: HOSPADM

## 2024-04-27 RX ORDER — FENTANYL CITRATE 50 UG/ML
INJECTION, SOLUTION INTRAMUSCULAR; INTRAVENOUS
Status: COMPLETED
Start: 2024-04-27 | End: 2024-04-27

## 2024-04-27 RX ORDER — ACETAMINOPHEN 325 MG/10.15ML
10 LIQUID ORAL ONCE
Status: DISCONTINUED | OUTPATIENT
Start: 2024-04-27 | End: 2024-04-27

## 2024-04-27 RX ORDER — SODIUM CHLORIDE, SODIUM LACTATE, POTASSIUM CHLORIDE, CALCIUM CHLORIDE 600; 310; 30; 20 MG/100ML; MG/100ML; MG/100ML; MG/100ML
INJECTION, SOLUTION INTRAVENOUS CONTINUOUS
Status: DISCONTINUED | OUTPATIENT
Start: 2024-04-27 | End: 2024-04-29

## 2024-04-27 RX ORDER — SODIUM BICARBONATE 325 MG/1
325 TABLET ORAL DAILY
Status: DISCONTINUED | OUTPATIENT
Start: 2024-04-27 | End: 2024-04-27

## 2024-04-27 RX ORDER — LIDOCAINE 40 MG/G
CREAM TOPICAL
Status: DISCONTINUED | OUTPATIENT
Start: 2024-04-27 | End: 2024-04-27

## 2024-04-27 RX ORDER — CHOLECALCIFEROL (VITAMIN D3) 50 MCG
100 TABLET ORAL DAILY
Status: DISCONTINUED | OUTPATIENT
Start: 2024-04-27 | End: 2024-05-05 | Stop reason: HOSPADM

## 2024-04-27 RX ORDER — NOREPINEPHRINE BITARTRATE 0.02 MG/ML
.01-.6 INJECTION, SOLUTION INTRAVENOUS CONTINUOUS
Status: DISCONTINUED | OUTPATIENT
Start: 2024-04-27 | End: 2024-04-30

## 2024-04-27 RX ORDER — CARBAMAZEPINE 100 MG/5ML
100 SUSPENSION ORAL DAILY
Status: DISCONTINUED | OUTPATIENT
Start: 2024-04-27 | End: 2024-05-05 | Stop reason: HOSPADM

## 2024-04-27 RX ORDER — LIDOCAINE 40 MG/G
CREAM TOPICAL
Status: DISCONTINUED | OUTPATIENT
Start: 2024-04-27 | End: 2024-05-05 | Stop reason: HOSPADM

## 2024-04-27 RX ORDER — METRONIDAZOLE 500 MG/100ML
500 INJECTION, SOLUTION INTRAVENOUS EVERY 8 HOURS
Status: COMPLETED | OUTPATIENT
Start: 2024-04-27 | End: 2024-04-29

## 2024-04-27 RX ORDER — LEVOTHYROXINE SODIUM 112 UG/1
112 TABLET ORAL
Status: DISCONTINUED | OUTPATIENT
Start: 2024-04-28 | End: 2024-05-01

## 2024-04-27 RX ORDER — METOCLOPRAMIDE HYDROCHLORIDE 5 MG/5ML
10 SOLUTION ORAL
Status: DISCONTINUED | OUTPATIENT
Start: 2024-04-27 | End: 2024-05-05 | Stop reason: HOSPADM

## 2024-04-27 RX ORDER — IOPAMIDOL 755 MG/ML
117 INJECTION, SOLUTION INTRAVASCULAR ONCE
Status: COMPLETED | OUTPATIENT
Start: 2024-04-27 | End: 2024-04-27

## 2024-04-27 RX ORDER — AMOXICILLIN 250 MG
1 CAPSULE ORAL 2 TIMES DAILY PRN
Status: DISCONTINUED | OUTPATIENT
Start: 2024-04-27 | End: 2024-05-05 | Stop reason: HOSPADM

## 2024-04-27 RX ORDER — LANOLIN ALCOHOL/MO/W.PET/CERES
1000 CREAM (GRAM) TOPICAL DAILY
Status: DISCONTINUED | OUTPATIENT
Start: 2024-04-28 | End: 2024-05-05 | Stop reason: HOSPADM

## 2024-04-27 RX ORDER — ACETAMINOPHEN 325 MG/10.15ML
325 LIQUID ORAL ONCE
Status: COMPLETED | OUTPATIENT
Start: 2024-04-27 | End: 2024-04-27

## 2024-04-27 RX ORDER — PIPERACILLIN SODIUM, TAZOBACTAM SODIUM 4; .5 G/20ML; G/20ML
4.5 INJECTION, POWDER, LYOPHILIZED, FOR SOLUTION INTRAVENOUS ONCE
Status: COMPLETED | OUTPATIENT
Start: 2024-04-27 | End: 2024-04-27

## 2024-04-27 RX ORDER — IOPAMIDOL 755 MG/ML
75 INJECTION, SOLUTION INTRAVASCULAR ONCE
Status: DISCONTINUED | OUTPATIENT
Start: 2024-04-27 | End: 2024-04-27

## 2024-04-27 RX ORDER — AMOXICILLIN 250 MG
2 CAPSULE ORAL 2 TIMES DAILY PRN
Status: DISCONTINUED | OUTPATIENT
Start: 2024-04-27 | End: 2024-05-05 | Stop reason: HOSPADM

## 2024-04-27 RX ORDER — CEFEPIME HYDROCHLORIDE 2 G/1
2 INJECTION, POWDER, FOR SOLUTION INTRAVENOUS EVERY 8 HOURS
Status: COMPLETED | OUTPATIENT
Start: 2024-04-27 | End: 2024-05-03

## 2024-04-27 RX ORDER — ALBUTEROL SULFATE 0.83 MG/ML
2.5 SOLUTION RESPIRATORY (INHALATION) EVERY 6 HOURS PRN
Status: DISCONTINUED | OUTPATIENT
Start: 2024-04-27 | End: 2024-05-05 | Stop reason: HOSPADM

## 2024-04-27 RX ORDER — ONDANSETRON 2 MG/ML
4 INJECTION INTRAMUSCULAR; INTRAVENOUS EVERY 6 HOURS PRN
Status: DISCONTINUED | OUTPATIENT
Start: 2024-04-27 | End: 2024-05-05 | Stop reason: HOSPADM

## 2024-04-27 RX ORDER — ACETAMINOPHEN 325 MG/1
650 TABLET ORAL EVERY 6 HOURS PRN
Status: DISCONTINUED | OUTPATIENT
Start: 2024-04-27 | End: 2024-05-05 | Stop reason: HOSPADM

## 2024-04-27 RX ADMIN — IOPAMIDOL 117 ML: 755 INJECTION, SOLUTION INTRAVENOUS at 18:17

## 2024-04-27 RX ADMIN — Medication 15 ML: at 19:32

## 2024-04-27 RX ADMIN — ACETAMINOPHEN 325 MG: 325 SUSPENSION ORAL at 11:08

## 2024-04-27 RX ADMIN — BRIVARACETAM 100 MG: 10 SOLUTION ORAL at 20:08

## 2024-04-27 RX ADMIN — HYDROCORTISONE SODIUM SUCCINATE 50 MG: 100 INJECTION, POWDER, FOR SOLUTION INTRAMUSCULAR; INTRAVENOUS at 22:00

## 2024-04-27 RX ADMIN — FENTANYL CITRATE 25 MCG: 50 INJECTION, SOLUTION INTRAMUSCULAR; INTRAVENOUS at 21:25

## 2024-04-27 RX ADMIN — SODIUM CHLORIDE 100 ML: 9 INJECTION, SOLUTION INTRAVENOUS at 18:18

## 2024-04-27 RX ADMIN — ACETAMINOPHEN 672 MG: 325 SUSPENSION ORAL at 08:19

## 2024-04-27 RX ADMIN — METRONIDAZOLE 500 MG: 500 INJECTION, SOLUTION INTRAVENOUS at 19:33

## 2024-04-27 RX ADMIN — IOPAMIDOL 75 ML: 755 INJECTION, SOLUTION INTRAVENOUS at 09:45

## 2024-04-27 RX ADMIN — ENOXAPARIN SODIUM 40 MG: 40 INJECTION SUBCUTANEOUS at 19:32

## 2024-04-27 RX ADMIN — CARBAMAZEPINE 150 MG: 100 SUSPENSION ORAL at 23:53

## 2024-04-27 RX ADMIN — SODIUM CHLORIDE, POTASSIUM CHLORIDE, SODIUM LACTATE AND CALCIUM CHLORIDE: 600; 310; 30; 20 INJECTION, SOLUTION INTRAVENOUS at 10:26

## 2024-04-27 RX ADMIN — SODIUM CHLORIDE 66 ML: 9 INJECTION, SOLUTION INTRAVENOUS at 09:45

## 2024-04-27 RX ADMIN — NOREPINEPHRINE BITARTRATE 0.03 MCG/KG/MIN: 0.02 INJECTION, SOLUTION INTRAVENOUS at 23:54

## 2024-04-27 RX ADMIN — CEFEPIME 2 G: 2 INJECTION, POWDER, FOR SOLUTION INTRAVENOUS at 11:27

## 2024-04-27 RX ADMIN — NOREPINEPHRINE BITARTRATE 0.03 MCG/KG/MIN: 0.02 INJECTION, SOLUTION INTRAVENOUS at 19:59

## 2024-04-27 RX ADMIN — CARBAMAZEPINE 100 MG: 100 SUSPENSION ORAL at 22:02

## 2024-04-27 RX ADMIN — VANCOMYCIN HYDROCHLORIDE 1500 MG: 10 INJECTION, POWDER, LYOPHILIZED, FOR SOLUTION INTRAVENOUS at 22:02

## 2024-04-27 RX ADMIN — FENTANYL CITRATE 25 MCG: 50 INJECTION, SOLUTION INTRAMUSCULAR; INTRAVENOUS at 22:20

## 2024-04-27 RX ADMIN — POTASSIUM & SODIUM PHOSPHATES POWDER PACK 280-160-250 MG 1 PACKET: 280-160-250 PACK at 19:32

## 2024-04-27 RX ADMIN — SODIUM CHLORIDE, POTASSIUM CHLORIDE, SODIUM LACTATE AND CALCIUM CHLORIDE 2004 ML: 600; 310; 30; 20 INJECTION, SOLUTION INTRAVENOUS at 08:27

## 2024-04-27 RX ADMIN — CEFEPIME 2 G: 2 INJECTION, POWDER, FOR SOLUTION INTRAVENOUS at 19:32

## 2024-04-27 RX ADMIN — HYDROCORTISONE SODIUM SUCCINATE 100 MG: 100 INJECTION, POWDER, FOR SOLUTION INTRAMUSCULAR; INTRAVENOUS at 11:13

## 2024-04-27 RX ADMIN — SODIUM CHLORIDE, POTASSIUM CHLORIDE, SODIUM LACTATE AND CALCIUM CHLORIDE 1000 ML: 600; 310; 30; 20 INJECTION, SOLUTION INTRAVENOUS at 11:45

## 2024-04-27 RX ADMIN — PIPERACILLIN AND TAZOBACTAM 4.5 G: 4; .5 INJECTION, POWDER, FOR SOLUTION INTRAVENOUS at 08:27

## 2024-04-27 RX ADMIN — VANCOMYCIN HYDROCHLORIDE 1750 MG: 10 INJECTION, POWDER, LYOPHILIZED, FOR SOLUTION INTRAVENOUS at 12:04

## 2024-04-27 RX ADMIN — Medication 100 MCG: at 19:32

## 2024-04-27 RX ADMIN — SODIUM CHLORIDE, POTASSIUM CHLORIDE, SODIUM LACTATE AND CALCIUM CHLORIDE: 600; 310; 30; 20 INJECTION, SOLUTION INTRAVENOUS at 17:24

## 2024-04-27 RX ADMIN — FENTANYL CITRATE 25 MCG: 50 INJECTION, SOLUTION INTRAMUSCULAR; INTRAVENOUS at 21:58

## 2024-04-27 ASSESSMENT — ACTIVITIES OF DAILY LIVING (ADL)
ADLS_ACUITY_SCORE: 41
ADLS_ACUITY_SCORE: 41
ADLS_ACUITY_SCORE: 55
ADLS_ACUITY_SCORE: 41
ADLS_ACUITY_SCORE: 51
ADLS_ACUITY_SCORE: 55
ADLS_ACUITY_SCORE: 41
ADLS_ACUITY_SCORE: 41
ADLS_ACUITY_SCORE: 55
ADLS_ACUITY_SCORE: 61
ADLS_ACUITY_SCORE: 41
ADLS_ACUITY_SCORE: 41
ADLS_ACUITY_SCORE: 55
ADLS_ACUITY_SCORE: 55
ADLS_ACUITY_SCORE: 61
ADLS_ACUITY_SCORE: 41

## 2024-04-27 ASSESSMENT — COLUMBIA-SUICIDE SEVERITY RATING SCALE - C-SSRS
1. IN THE PAST MONTH, HAVE YOU WISHED YOU WERE DEAD OR WISHED YOU COULD GO TO SLEEP AND NOT WAKE UP?: NO
6. HAVE YOU EVER DONE ANYTHING, STARTED TO DO ANYTHING, OR PREPARED TO DO ANYTHING TO END YOUR LIFE?: NO
2. HAVE YOU ACTUALLY HAD ANY THOUGHTS OF KILLING YOURSELF IN THE PAST MONTH?: NO

## 2024-04-27 NOTE — ED PROVIDER NOTES
History     Chief Complaint:  Fever and Hypotension       HPI   Keyon Farias is a 63 year old male with a history of TBI, sepsis, DVT, seizures, pneumonia, and ventricular fibrillation who presents to the ED via EMS from home for evaluation of fever and hypotension. Per EMS, the patient has had pneumonia for about 1 week, on amoxicillin to treat, and the patient's mother called EMS due to increasing fevers with 101F last night and 102.9F this morning. Upon EMS arrival, the patient was warm to the touch, had a heart rate of 125, blood pressure of 90/57, oxygen level of 88%, and a blood sugar of 147. Reports the patient is not on oxygen at baseline but due to his oxygen levels, administered supplemental oxygen via nasal cannula at 6 LPM. Notes the patient is usually hypotensive. Upon arrival to the ED, the patient is awake and responsive.    Independent Historian:    EMS - They report as noted above.    Review of External Notes:  Reviewed patient's hospitalization from 4/17/2024, patient was discharged on 4/20/2024.  He was here for acute metabolic encephalopathy secondary to aspiration pneumonia.  Patient also has a history of chronic dysphagia with PEG tube, history of TBI, spastic hemiplegia with right-sided paresis and a history of seizures.  Patient was treated with Zosyn and transition to Augmentin at discharge.  Blood cultures were thought to be contaminant as 1 out of 2 were positive for coag negative staph.    Medications:    Albuterol  Augmentin  Briviact  Carbamazepine  Cyanocobalamin  Glycopyrrolate  Guaifenesin  Hydrocortisone  Levothyroxine  Metoclopramide  Pantoprazole  Depotestosterone  Amoxicillin     Past Medical History:    TBI  Aphasia  DVT, upper extremity  GERD  Panhypopituitarism   Pneumonia  Seizures   Septic shock  Thyroid disease  Ventricular fibrillation     Past Surgical History:    Reconstructive facial surgery   G-J tube placement  Appendectomy  Hand repair (R)  Tracheostomy  Vascular  surgery     Physical Exam   Patient Vitals for the past 24 hrs:   BP Temp Temp src Pulse Resp SpO2 Weight   04/27/24 1034 105/60 (!) 100.6  F (38.1  C) Temporal 113 23 92 % --   04/27/24 0957 -- -- -- 120 14 96 % --   04/27/24 0930 105/79 -- -- (!) 123 (!) 32 97 % --   04/27/24 0923 -- -- -- (!) 121 28 96 % --   04/27/24 0915 128/53 -- -- (!) 121 27 98 % --   04/27/24 0908 -- -- -- (!) 121 23 96 % --   04/27/24 0902 115/54 -- -- (!) 123 25 95 % --   04/27/24 0812 97/61 -- -- (!) 122 (!) 35 92 % --   04/27/24 0810 -- (!) 101.9  F (38.8  C) Rectal -- -- -- --   04/27/24 0802 (!) 89/70 -- -- (!) 125 18 96 % 66.8 kg (147 lb 4.3 oz)        Physical Exam  General: resting comfortably when I enter the room  Eyes: Pupils equal, conjunctivae pink no scleral icterus or conjunctival injection  ENT:  Dry mucus membranes  Respiratory:  Lungs clear to auscultation bilaterally, no crackles/rubs/wheezes.  Diminished air movement  CV: Normal  rhythm, tachycardia, no murmurs  GI:  Abdomen soft and non-distended.  No tenderness, guarding or rebound.  G-tube present, no surrounding redness or swelling.  Skin: Warm, dry.  No rashes or petechiae  Musculoskeletal: No peripheral edema or calf tenderness  Neuro: Alert and oriented to person/place/time  Psychiatric: Normal affect     Emergency Department Course   ECG  ECG taken at 0822, ECG read at 0832  Sinus tachycardia  Left anterior fascicular block   Rate 123 bpm. OR interval 144 ms. QRS duration 80 ms. QT/QTc 310/443 ms. P-R-T axes 63 -71 78.    Imaging:  CT Chest w Contrast   Final Result   IMPRESSION:    1.  Bibasilar, right greater than left, pneumonia. Findings are similar to 12/20/2023. Sequelae of aspiration is possible.   2.  Similar enlarged right hilar and subcarinal lymph nodes.   3.  Similar pancreatic body/tail 2.7 cm cystic lesion.                Laboratory:  Labs Ordered and Resulted from Time of ED Arrival to Time of ED Departure   COMPREHENSIVE METABOLIC PANEL -  Abnormal       Result Value    Sodium 146 (*)     Potassium 3.7      Carbon Dioxide (CO2) 28      Anion Gap 14      Urea Nitrogen 23.1 (*)     Creatinine 1.25 (*)     GFR Estimate 66      Calcium 9.4      Chloride 104      Glucose 133 (*)     Alkaline Phosphatase 87      AST 28      ALT 24      Protein Total 8.5 (*)     Albumin 4.0      Bilirubin Total 0.4     ROUTINE UA WITH MICROSCOPIC REFLEX TO CULTURE - Abnormal    Color Urine Yellow      Appearance Urine Clear      Glucose Urine Negative      Bilirubin Urine Negative      Ketones Urine Negative      Specific Gravity Urine 1.031      Blood Urine Negative      pH Urine 8.0 (*)     Protein Albumin Urine 30 (*)     Urobilinogen Urine 2.0      Nitrite Urine Negative      Leukocyte Esterase Urine Negative      Mucus Urine Present (*)     RBC Urine 2      WBC Urine <1     CBC WITH PLATELETS AND DIFFERENTIAL - Abnormal    WBC Count 15.0 (*)     RBC Count 6.01 (*)     Hemoglobin 16.2      Hematocrit 50.9      MCV 85      MCH 27.0      MCHC 31.8      RDW 15.4 (*)     Platelet Count 275      % Neutrophils 60      % Lymphocytes 28      % Monocytes 8      % Eosinophils 3      % Basophils 1      % Immature Granulocytes 0      NRBCs per 100 WBC 0      Absolute Neutrophils 8.9 (*)     Absolute Lymphocytes 4.3      Absolute Monocytes 1.3      Absolute Eosinophils 0.4      Absolute Basophils 0.1      Absolute Immature Granulocytes 0.1      Absolute NRBCs 0.0     ISTAT GASES LACTATE VENOUS POCT - Abnormal    Lactic Acid POCT 2.2 (*)     Bicarbonate Venous POCT 33 (*)     O2 Sat, Venous POCT 25 (*)     pCO2 Venous POCT 57 (*)     pH Venous POCT 7.37      pO2 Venous POCT 19 (*)     Base Excess/Deficit (+/-) POCT 6.0 (*)    INFLUENZA A/B, RSV, & SARS-COV2 PCR - Normal    Influenza A PCR Negative      Influenza B PCR Negative      RSV PCR Negative      SARS CoV2 PCR Negative     LACTIC ACID WHOLE BLOOD   BLOOD CULTURE   BLOOD CULTURE        Emergency Department Course &  Assessments:    Interventions:  Medications   sodium chloride (PF) 0.9% PF flush 3 mL (has no administration in time range)   sodium chloride (PF) 0.9% PF flush 3 mL (3 mLs Intracatheter $Given 4/27/24 0828)   lactated ringers infusion ( Intravenous $New Bag 4/27/24 1026)   ceFEPIme (MAXIPIME) 2 g vial to attach to  mL bag for ADULTS or 50 mL bag for PEDS (has no administration in time range)   vancomycin (VANCOCIN) 1,750 mg in 0.9% NaCl 500 mL intermittent infusion (has no administration in time range)   acetaminophen (TYLENOL) oral liquid 672 mg (672 mg Oral $Given 4/27/24 0819)   lactated ringers BOLUS 2,004 mL (0 mLs Intravenous Stopped 4/27/24 0939)   piperacillin-tazobactam (ZOSYN) 4.5 g vial to attach to  mL bag (0 g Intravenous Stopped 4/27/24 0900)   Saline (66 mLs As instructed $Given 4/27/24 0945)   iopamidol (ISOVUE-370) solution 75 mL (75 mLs Intravenous $Given 4/27/24 0945)   hydrocortisone sodium succinate PF (solu-CORTEF) injection 100 mg (100 mg Intravenous $Given 4/27/24 1113)   acetaminophen (TYLENOL) oral liquid 325 mg (325 mg Per G Tube $Given 4/27/24 1108)        Assessments:  0758 Patient arrival to ED.  0800 I obtained history and performed a physical exam as noted above.       Independent Interpretation (X-rays, CTs, rhythm strip):      Consultations/Discussion of Management or Tests:  1048 I spoke with ALBERT Walters for Dr. Vivar of hospitalist service regarding admission.       Social Determinants of Health affecting care:  None     Disposition:  The patient was admitted to the hospital under the care of Dr. Vivar.    Impression & Plan    CMS Diagnoses: The patient has signs of Severe Sepsis        If one the following conditions is present, a 30 mL/kg bolus is recommended as part of the 6 hour bundle (IBW can be used for BMI >30, or document refusal/contraindication):      1.   Initial hypotension  defined as 2 bps < 90 or map < 65 in the 6hrs before or 3hrs after  "time zero.     2.  Lactate >4.      The patient has signs of Severe Sepsis as evidenced by:    1. 2 SIRS criteria, AND  2. Suspected infection, AND   3. Organ dysfunction: Lactic Acidosis with value >2.0    Time severe sepsis diagnosis confirmed: 0812 04/27/24 as this was the time when SBP <90 or MAP <65 and Lactate resulted, and the level was > 2.0    3 Hour Severe Sepsis Bundle Completion:    1. Initial Lactic Acid Result:   Recent Labs   Lab Test 04/27/24  0812 04/17/24 2011 03/21/24  1845   LACT 2.2* 1.7 1.7     2. Blood Cultures before Antibiotics: Yes  3. Broad Spectrum Antibiotics Administered:  yes       Anti-infectives (From admission through now)      Start     Dose/Rate Route Frequency Ordered Stop    04/27/24 1055  vancomycin (VANCOCIN) 1,750 mg in 0.9% NaCl 500 mL intermittent infusion         1,750 mg  over 3 Hours Intravenous ONCE 04/27/24 1054      04/27/24 1050  ceFEPIme (MAXIPIME) 2 g vial to attach to  mL bag for ADULTS or 50 mL bag for PEDS         2 g  over 30 Minutes Intravenous ONCE 04/27/24 1049      04/27/24 0815  piperacillin-tazobactam (ZOSYN) 4.5 g vial to attach to  mL bag        Note to Pharmacy: For [facility], [facility] and [facility]: For Zosyn-naive patients, use the \"Zosyn initial dose + extended infusion\" order panel.    4.5 g  over 30 Minutes Intravenous ONCE 04/27/24 0810 04/27/24 0900            4. Is initial hypotension present?     Yes. (Definition - 2 SBPs <90, MAP <65, or decrease > 40 from baseline due to infection w/in 3 hrs of each other during the time period of 6 hrs before and 3 hrs after time zero)   Full 30 mL/kg bolus given (see amount below).    BMI Readings from Last 1 Encounters:   04/27/24 21.13 kg/m      30 mL/kg fluids based on weight: 2,000 mL  30 mL/kg fluids based on IBW (must be >= 60 inches tall): 2,190 mL                    Severe Sepsis reassessment:  1. Repeat Lactic Acid Level within 6 hours of time zero: 2.9  2. MAP>65 after initial IVF bolus, will " continue to monitor fluid status and vital signs    I attest to having performed a repeat sepsis exam and assessment of perfusion at 1230 and the results demonstrate improved perfusion.   and None       Medical Decision Makin-year-old male with a history of TBI, spastic hemiaplasia and right-sided paresis, seizures, chronic dysphagia with PEG tube presents to the emergency department with a complaint of fever and tachycardia.  Patient lives at home with his mother.  He was recently treated for aspiration pneumonia and has been on Augmentin.  Patient's mother reports that he started to have a fever today and his heart rate was going up.  When EMS arrived patient was hypoxic on room air and was started on supplemental oxygen with improvement of saturations.  On exam patient is nonverbal, his eyes are open, he is moving the left side of his body.  EMS reports that this is his baseline mentation.  He feels warm to touch, and is febrile and tachycardic.  He is 92% on room air.  His lung sounds are clear but he does cough and it sounds very rhonchorous.  I do not find any other skin wounds.  On reevaluation, patient is still febrile.  His blood pressure has improved with fluids, and his maps have been above 65.  He has not needed any supplemental oxygen, his oxygen saturations have been around 96%.  Patient is still tachycardic.  No signs of urinary tract infection.  CT scan shows pneumonia.  I am not sure if this is new pneumonia, or if the Augmentin did not clear everything.  I would suspect that it is aspiration pneumonia.  I spoke with hospitalist team.  Will cover him with more broad-spectrum antibiotics as he has recently been on Zosyn.  I did speak with the pharmacist, and will cover him with Vanco and cefepime.  I will give the rest of the dose of Tylenol to make it 1 g total.  He is also given a stress dose of Solu-Cortef.  Patient is admitted to [facility].    Critical Care time:  was 20 minutes for this patient  excluding procedures.    Diagnosis:    ICD-10-CM    1. Sepsis due to pneumonia (H)  J18.9     A41.9       2. Pneumonia of both lower lobes due to infectious organism  J18.9            Discharge Medications:  New Prescriptions    No medications on file          Scribe Disclosure:  I, Marina Moreno, am serving as a scribe at 8:12 AM on 4/27/2024 to document services personally performed by Jossie Eugene MD based on my observations and the provider's statements to me.    4/27/2024   No att. providers found              Jossie Eugene MD  04/27/24 7782

## 2024-04-27 NOTE — PROGRESS NOTES
Pt arrived from ED at 1500 - Vitally stable at the time. BP in 80s at 1600 check - provider notified and RRT called at 1618. Pt subsequently transferred to ICU - Report given to Zoe

## 2024-04-27 NOTE — ED NOTES
Bed: [facility]  Expected date:   Expected time:   Means of arrival:   Comments:  Kerri 63m sepsis
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
## 2024-04-27 NOTE — ED TRIAGE NOTES
Been on Amoxicillin for one week for pneumonia, mother noted patient with fever yesterday, this morning had fever of 102.9. Patient arrived with tachycardia and hypotension.      Triage Assessment (Adult)       Row Name 04/27/24 0806          Triage Assessment    Airway WDL WDL        Respiratory WDL    Respiratory WDL X        Skin Circulation/Temperature WDL    Skin Circulation/Temperature WDL WDL        Cardiac WDL    Cardiac WDL X        Peripheral/Neurovascular WDL    Peripheral Neurovascular WDL WDL        Cognitive/Neuro/Behavioral WDL    Cognitive/Neuro/Behavioral WDL X

## 2024-04-27 NOTE — PHARMACY-ADMISSION MEDICATION HISTORY
Pharmacist Admission Medication History    Admission medication history is complete. The information provided in this note is only as accurate as the sources available at the time of the update.    Information Source(s): Family member, Hospital records, and CareEverywhere/SureScripts via phone    Pertinent Information:     Changes made to PTA medication list:  Added: None  Deleted: None  Changed: None    Allergies reviewed with patient and updates made in EHR: unable to assess    Medication History Completed By: Akil Aliheyder, [facility] 4/27/2024 11:13 AM    PTA Med List   Medication Sig Last Dose    acetaminophen (TYLENOL) 325 MG tablet Take 650 mg by mouth every 6 hours as needed for mild pain     albuterol (PROVENTIL) (5 MG/ML) 0.5% neb solution Take 0.5 mLs (2.5 mg) by nebulization every 6 hours as needed for shortness of breath, wheezing or cough 0700 1100 1500 1900 with mucomyst     amoxicillin-clavulanate (AUGMENTIN) 875-125 MG tablet Take 1 tablet by mouth 2 times daily for 8 days 4/26/2024 at pm    bacitracin 500 UNIT/GM external ointment Apply topically daily as needed for wound care To PEG site.     Brivaracetam (BRIVIACT) 10 MG/ML solution 100 mg by Oral or Feeding Tube route 2 times daily 0900, 2100 4/26/2024 at pm    Calcium-Magnesium-Zinc 333-133-5 MG TABS per tablet Take 1 tablet by mouth daily     carBAMazepine (TEGRETOL) 100 MG/5ML suspension 7.5 mLs (150 mg) by Oral or Feeding Tube route 3 times daily At 06:00, 12:00, and 24:00 for seizures 4/27/2024 at am    carBAMazepine (TEGRETOL) 100 MG/5ML suspension 100 mg by Per Feeding Tube route daily Take at 1800 4/26/2024    COMPOUNDED NON-CONTROLLED SUBSTANCE (CMPD RX) - PHARMACY TO MIX COMPOUNDED MEDICATION Scopolamine 0.4mg capsules - take  2 capsule by feeding tube three times daily as needed     Cyanocobalamin (VITAMIN B-12 PO) 1,000 mcg by Per Feeding Tube route daily     glycopyrrolate (ROBINUL) 1 MG tablet TAKE 1 TABLET(1 MG) BY MOUTH TWICE DAILY AS  NEEDED FOR SECRETIONS     guaiFENesin (MUCINEX) 600 MG 12 hr tablet Take 1 tablet (600 mg) by mouth 2 times daily as needed for congestion     hydrocortisone (CORTAID) 1 % external cream Apply topically 2 times daily as needed Apply to reddened memo areas as needed     hydrocortisone (CORTEF) 5 MG tablet Take 15 mg (3 tablets) in the morning and 7.5 mg (1.5 tablet)  at 2:00 PM. During illness patient takes more as a stress dose. Please increase the dose as directed. (Patient taking differently: Take 15 mg (3 tablets) in the morning and 7.5 mg (1.5 tablet)  at 4:00 PM. Per feeding tube. During illness patient takes more as a stress dose. Mother reports doubling each dose for fever) 4/26/2024    levothyroxine (SYNTHROID/LEVOTHROID) 112 MCG tablet TAKE 1 TABLET(112 MCG) BY MOUTH DAILY 4/27/2024    metoclopramide (REGLAN) 10 MG/10ML SOLN solution Take 10 mLs (10 mg) by mouth 4 times daily (before meals and nightly) 0800, 1200, 1600, 2000 Disconnects bag before administration, then waits 45 mins before reconnecting after giving the medication 4/26/2024    miconazole (MICATIN) 2 % external powder Apply topically 2 times daily as needed     multivitamin, therapeutic (THERA-VIT) TABS tablet 1 tablet by Per Feeding Tube route daily 4/26/2024    mupirocin (BACTROBAN) 2 % external ointment APPLY TOPICALLY TO THE AFFECTED AREA TWICE DAILY AS NEEDED     pantoprazole (PROTONIX) 2 mg/mL SUSP suspension TAKE 20ML PER FEEDING TUBE DAILY 4/27/2024    potassium & sodium phosphates (NEUTRA-PHOS) 280-160-250 MG Packet Take 1 packet by mouth daily     testosterone cypionate (DEPOTESTOSTERONE) 200 MG/ML injection INJECT 0.25ML IN THE MUSCLE ONCE A WEEK. DISCARD REMAINDER OF VIAL (Patient taking differently: On Thursdays) Past Month    UNABLE TO FIND Take 0.125 teaspoonful by mouth 2 times daily MEDICATION NAME: pink salt 1/8 teaspoon po bid (instead of sodium bicarb).     vitamin C (ASCORBIC ACID) 1000 MG TABS 1,000 mg by Oral or Feeding  Tube route daily  4/26/2024    vitamin D3 (CHOLECALCIFEROL) 2000 units (50 mcg) tablet 4,000 Units by Per Feeding Tube route daily 4/26/2024

## 2024-04-27 NOTE — CODE/RAPID RESPONSE
[facility]    Code Stroke  House Officer Note    Code stroke called by intensivist as pt was very slow to respond to sternal rub, w sepsis, and hx of TBI .  Patient's mentation has been depressed since admission below typical baseline.  Does not follow commands outside of thumbs up and thumbs down.Patient appeared to wake up during the calling of the code stroke  able to give thumbs up and thumbs down.  This may be his typical baseline.    Stroke evaluation information is limited by difficulty with communication, unable to follow commands. Spoke with Dr. Joshi, does recommend stroke imaging.  CT head, CT head/cervical angiogram, CT perfusion ordered. Note hx of Seizure in the differential also, although no seizure activity was noted.  It is likely some of his acute metabolic encephalopathy symptoms are secondary to his severe sepsis      ////////////////////////////////////////////////////////////////////////////////////////////////////////////////////////////////  Acute stroke evaluation:  Time of arrival: 1755   Time called: 1749   Glucose level 130   Time patient seen by neurology: tbd   Time onset of stroke symptoms / witnessed onset: 1745/unknown   Time last known well: ? 4/26/24   IV tPA admistered: tbd   IA / Thrombolectomy tbd   Stroke Presentation Type Inhouse Stroke   Stroke Thrombolytic Ineligible Reason tbd   Stroke Thrombolytic Treatments tbd   Neurologists Dr Joshi   Stroke Type of Vascular Event tbd   Pre TPa Wts entered? Yes   Post TPa VS Neuro Checks Yes     [employer] Stroke Scale  Exam Interval: Baseline  Patient does awaken to voice for me no longer requiring sternal rubs.  He is able to give thumbs up and thumbs down answers and nod head yes or no.  He has pre-existing aphasia.  Patient does minimally follow commands.  But tries to close his eyes with pupil checks and not following motor commands for full testing to be performed.    Vital Signs with  Ranges:  Temp:  [98.1  F (36.7  C)-101.9  F (38.8  C)] 98.8  F (37.1  C)  Pulse:  [] 84  Resp:  [10-35] 18  BP: ()/(44-83) 96/51  SpO2:  [90 %-99 %] 99 %    Imaging:  Recent Results (from the past 24 hour(s))   CT Chest w Contrast    Narrative    EXAM: CT CHEST WITH CONTRAST  LOCATION: [facility]  DATE: 04/27/2024    INDICATION: Hypoxia, fever. History of pneumonia.  COMPARISON: Chest radiograph 04/17/2024. CT chest 12/30/2023.  TECHNIQUE: CT chest with IV contrast. Multiplanar reformats were obtained. Dose reduction techniques were used.    CONTRAST: 75 mL Isovue 370.    FINDINGS:   LUNGS AND PLEURA: Bibasilar, right greater than left, consolidative opacities. Bilateral lower lobe and right middle lobe bronchial wall thickening. No pleural effusion.    MEDIASTINUM/AXILLAE: Similar enlarged right hilar and subcarinal lymph nodes. No thoracic aortic aneurysm.    CORONARY ARTERY CALCIFICATION: None.    UPPER ABDOMEN: Left hepatic lobe cyst. Similar pancreatic body/tail 2.7 cm cyst. Partially visualized gastrojejunostomy tube. No acute findings.     MUSCULOSKELETAL: No aggressive osseous lesion.      Impression    IMPRESSION:   1.  Bibasilar, right greater than left, pneumonia. Findings are similar to 12/20/2023. Sequelae of aspiration is possible.  2.  Similar enlarged right hilar and subcarinal lymph nodes.  3.  Similar pancreatic body/tail 2.7 cm cystic lesion.           Physical Exam     Assessment/Plan:  Assessment & Plan   Somnolence, likely secondary to sepsis, rule out acute CVA  Severe Sepsis, bibasilar pneumonia  See earlier RRT for full dx    INTERVENTIONS:  -Code stroke initiated by intensivist  -Discussed with neurologist  -CT head neck imaging ordered, pending at the time of this note  -Improved mentation, likely near baseline  -Sepsis treatment as before  -May need pressors  -Discussed in detail with intensivist.  -Anesthesia responded to the room due to respiratory  concerns but noting the patient's mentation improved rapidly no additional intervention was needed, protecting airway well  -VBG lactate collected, finely    Code Status: Full Code    Discussed with and defer further cares to rounding hospitalist    Subjective:    Interval History     See the earlier RRT for hypotension    Physical Exam     Exam is improved for me.  He was somnolent before now he is able to awaken easily.  No sternal rub required.  Able to give me thumbs up and thumbs down.  Indicates he does not have head symptoms or head pain.  Not able to follow commands otherwise.  Actively resist pupil checks with the machine.  unable not follow commands for moving distally, is a quad.    Detail coordination with intensivist.  They will formally complete consult.  They will follow the CT results and the pending labs.  They will handle initiation of pressors which may be needed tonight.    Detailed discussion with neurologist on phone, neurology will see after imaging.    Data   Recent Labs   Lab 04/27/24  0812   WBC 15.0*   HGB 16.2   MCV 85          Time Spent on this Encounter   I spent additional 30 minutes on the unit/floor managing the care of Keyon Farias. Over 50% of my time was spent counseling the patient and/or coordinating care regarding services listed in this note.    [facility] House Officer/RADHA  Jeff Del Angel PA-C    [facility]  Securely message with the Vocera Web Console (learn more here)  Text page via [employer] Paging/Directory

## 2024-04-27 NOTE — PHARMACY-VANCOMYCIN DOSING SERVICE
Pharmacy Vancomycin Initial Note  Date of Service 2024  Patient's  1960  63 year old, male    Indication: [facility] Pneumonia    Current estimated CrCl = Estimated Creatinine Clearance: 66 mL/min (based on SCr of 1.11 mg/dL).    Creatinine for last 3 days  2024:  8:12 AM Creatinine 1.25 mg/dL; 12:29 PM Creatinine 1.11 mg/dL; 12:29 PM Creatinine 1.11 mg/dL    Recent Vancomycin Level(s) for last 3 days  No results found for requested labs within last 3 days.      Vancomycin IV Administrations (past 72 hours)                     vancomycin (VANCOCIN) 1,750 mg in 0.9% NaCl 500 mL intermittent infusion (mg) 1,750 mg Given 24 1204                    Nephrotoxins and other renal medications (From now, onward)      None            Contrast Orders - past 72 hours (72h ago, onward)      Start     Dose/Rate Route Frequency Stop    24 0945  iopamidol (ISOVUE-370) solution 75 mL  Status:  Discontinued         75 mL Intravenous ONCE 24 0945            Insight[employer] Prediction of Planned Initial Vancomycin Regimen    Loading dose: 1750 mg x 1   Regimen: 1500 mg IV every 24 hours beginning 8 hours after load  Start time: 22:00 on 2024  Exposure target: AUC24 (range)400-600 mg/L.hr   AUC24,ss: 536 mg/L.hr  Probability of AUC24 > 400: 80 %  Ctrough,ss: 14.9 mg/L  Probability of Ctrough,ss > 20: 27 %  Probability of nephrotoxicity (Lodise ERNESTO ): 10 %          Plan:  Vancomycin  1750 mg IV x 1 then Vancomycin 1500 mg q24h beginning 8 hours after load .   Vancomycin monitoring method: AUC  Vancomycin therapeutic monitoring goal: 400-600 mg*h/L  Pharmacy will check vancomycin levels as appropriate in 1-3 Days.    Serum creatinine levels will be ordered a minimum of twice weekly.      Adarsh Washington, [facility]
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
## 2024-04-27 NOTE — PROVIDER NOTIFICATION
MD Notification    Notified Person: MD    Notified Person Name: Bindu Yancey    Notification Date/Time: 1608 - 4/27    Notification Interaction: Vocera message    Purpose of Notification: BP 80s/40s    Orders Received: called RRT    Comments:

## 2024-04-28 ENCOUNTER — APPOINTMENT (OUTPATIENT)
Dept: CARDIOLOGY | Facility: CLINIC | Age: 64
DRG: 871 | End: 2024-04-28
Attending: INTERNAL MEDICINE
Payer: MEDICARE

## 2024-04-28 LAB
ANION GAP SERPL CALCULATED.3IONS-SCNC: 10 MMOL/L (ref 7–15)
BASE EXCESS BLDV CALC-SCNC: 1.9 MMOL/L (ref -3–3)
BUN SERPL-MCNC: 13.6 MG/DL (ref 8–23)
CALCIUM SERPL-MCNC: 8.3 MG/DL (ref 8.8–10.2)
CHLORIDE SERPL-SCNC: 111 MMOL/L (ref 98–107)
CHOLEST SERPL-MCNC: 118 MG/DL
CREAT SERPL-MCNC: 0.79 MG/DL (ref 0.67–1.17)
DEPRECATED HCO3 PLAS-SCNC: 23 MMOL/L (ref 22–29)
EGFRCR SERPLBLD CKD-EPI 2021: >90 ML/MIN/1.73M2
ENTEROCOCCUS FAECALIS: NOT DETECTED
ENTEROCOCCUS FAECIUM: NOT DETECTED
ERYTHROCYTE [DISTWIDTH] IN BLOOD BY AUTOMATED COUNT: 14.9 % (ref 10–15)
ERYTHROCYTE [DISTWIDTH] IN BLOOD BY AUTOMATED COUNT: 15 % (ref 10–15)
ERYTHROCYTE [DISTWIDTH] IN BLOOD BY AUTOMATED COUNT: 15.1 % (ref 10–15)
GLUCOSE BLDC GLUCOMTR-MCNC: 133 MG/DL (ref 70–99)
GLUCOSE BLDC GLUCOMTR-MCNC: 144 MG/DL (ref 70–99)
GLUCOSE BLDC GLUCOMTR-MCNC: 144 MG/DL (ref 70–99)
GLUCOSE SERPL-MCNC: 164 MG/DL (ref 70–99)
HBA1C MFR BLD: 5.6 %
HCO3 BLDV-SCNC: 27 MMOL/L (ref 21–28)
HCT VFR BLD AUTO: 36.5 % (ref 40–53)
HCT VFR BLD AUTO: 36.7 % (ref 40–53)
HCT VFR BLD AUTO: 37.9 % (ref 40–53)
HDLC SERPL-MCNC: 26 MG/DL
HGB BLD-MCNC: 11.6 G/DL (ref 13.3–17.7)
HGB BLD-MCNC: 11.8 G/DL (ref 13.3–17.7)
HGB BLD-MCNC: 12 G/DL (ref 13.3–17.7)
LACTATE SERPL-SCNC: 1.5 MMOL/L (ref 0.7–2)
LACTATE SERPL-SCNC: 2.2 MMOL/L (ref 0.7–2)
LDLC SERPL CALC-MCNC: 67 MG/DL
LISTERIA SPECIES (DETECTED/NOT DETECTED): NOT DETECTED
LVEF ECHO: NORMAL
MAGNESIUM SERPL-MCNC: 1.8 MG/DL (ref 1.7–2.3)
MCH RBC QN AUTO: 27.2 PG (ref 26.5–33)
MCH RBC QN AUTO: 27.3 PG (ref 26.5–33)
MCH RBC QN AUTO: 27.7 PG (ref 26.5–33)
MCHC RBC AUTO-ENTMCNC: 31.7 G/DL (ref 31.5–36.5)
MCHC RBC AUTO-ENTMCNC: 31.8 G/DL (ref 31.5–36.5)
MCHC RBC AUTO-ENTMCNC: 32.2 G/DL (ref 31.5–36.5)
MCV RBC AUTO: 86 FL (ref 78–100)
NONHDLC SERPL-MCNC: 92 MG/DL
O2/TOTAL GAS SETTING VFR VENT: 21 %
OXYHGB MFR BLDV: 95 % (ref 70–75)
PCO2 BLDV: 42 MM HG (ref 40–50)
PH BLDV: 7.41 [PH] (ref 7.32–7.43)
PLATELET # BLD AUTO: 167 10E3/UL (ref 150–450)
PLATELET # BLD AUTO: 175 10E3/UL (ref 150–450)
PLATELET # BLD AUTO: 194 10E3/UL (ref 150–450)
PO2 BLDV: 83 MM HG (ref 25–47)
POTASSIUM SERPL-SCNC: 4.4 MMOL/L (ref 3.4–5.3)
RBC # BLD AUTO: 4.26 10E6/UL (ref 4.4–5.9)
RBC # BLD AUTO: 4.26 10E6/UL (ref 4.4–5.9)
RBC # BLD AUTO: 4.4 10E6/UL (ref 4.4–5.9)
SAO2 % BLDV: 97.3 % (ref 70–75)
SODIUM SERPL-SCNC: 144 MMOL/L (ref 135–145)
STAPHYLOCOCCUS AUREUS: NOT DETECTED
STAPHYLOCOCCUS EPIDERMIDIS: NOT DETECTED
STAPHYLOCOCCUS LUGDUNENSIS: NOT DETECTED
STAPHYLOCOCCUS SPECIES: DETECTED
STREPTOCOCCUS AGALACTIAE: NOT DETECTED
STREPTOCOCCUS ANGINOSUS GROUP: NOT DETECTED
STREPTOCOCCUS PNEUMONIAE: NOT DETECTED
STREPTOCOCCUS PYOGENES: NOT DETECTED
STREPTOCOCCUS SPECIES: NOT DETECTED
TRIGL SERPL-MCNC: 123 MG/DL
WBC # BLD AUTO: 11.6 10E3/UL (ref 4–11)
WBC # BLD AUTO: 11.8 10E3/UL (ref 4–11)
WBC # BLD AUTO: 15.8 10E3/UL (ref 4–11)

## 2024-04-28 PROCEDURE — 87040 BLOOD CULTURE FOR BACTERIA: CPT | Performed by: INTERNAL MEDICINE

## 2024-04-28 PROCEDURE — 82805 BLOOD GASES W/O2 SATURATION: CPT | Performed by: INTERNAL MEDICINE

## 2024-04-28 PROCEDURE — 83605 ASSAY OF LACTIC ACID: CPT | Performed by: INTERNAL MEDICINE

## 2024-04-28 PROCEDURE — 258N000003 HC RX IP 258 OP 636

## 2024-04-28 PROCEDURE — 200N000001 HC R&B ICU

## 2024-04-28 PROCEDURE — 250N000013 HC RX MED GY IP 250 OP 250 PS 637: Performed by: PHYSICIAN ASSISTANT

## 2024-04-28 PROCEDURE — 999N000208 ECHOCARDIOGRAM COMPLETE

## 2024-04-28 PROCEDURE — 258N000003 HC RX IP 258 OP 636: Performed by: INTERNAL MEDICINE

## 2024-04-28 PROCEDURE — 80061 LIPID PANEL: CPT | Performed by: NURSE PRACTITIONER

## 2024-04-28 PROCEDURE — 85027 COMPLETE CBC AUTOMATED: CPT | Performed by: PHYSICIAN ASSISTANT

## 2024-04-28 PROCEDURE — 250N000011 HC RX IP 250 OP 636

## 2024-04-28 PROCEDURE — 80048 BASIC METABOLIC PNL TOTAL CA: CPT | Performed by: PHYSICIAN ASSISTANT

## 2024-04-28 PROCEDURE — 255N000002 HC RX 255 OP 636: Performed by: INTERNAL MEDICINE

## 2024-04-28 PROCEDURE — 250N000011 HC RX IP 250 OP 636: Performed by: PHYSICIAN ASSISTANT

## 2024-04-28 PROCEDURE — 99291 CRITICAL CARE FIRST HOUR: CPT | Performed by: INTERNAL MEDICINE

## 2024-04-28 PROCEDURE — 93306 TTE W/DOPPLER COMPLETE: CPT | Mod: 26 | Performed by: INTERNAL MEDICINE

## 2024-04-28 PROCEDURE — 36415 COLL VENOUS BLD VENIPUNCTURE: CPT | Performed by: INTERNAL MEDICINE

## 2024-04-28 PROCEDURE — 99221 1ST HOSP IP/OBS SF/LOW 40: CPT | Mod: FS | Performed by: NURSE PRACTITIONER

## 2024-04-28 PROCEDURE — 99233 SBSQ HOSP IP/OBS HIGH 50: CPT | Performed by: HOSPITALIST

## 2024-04-28 PROCEDURE — 3E043XZ INTRODUCTION OF VASOPRESSOR INTO CENTRAL VEIN, PERCUTANEOUS APPROACH: ICD-10-PCS | Performed by: PSYCHIATRY & NEUROLOGY

## 2024-04-28 PROCEDURE — 250N000011 HC RX IP 250 OP 636: Performed by: INTERNAL MEDICINE

## 2024-04-28 PROCEDURE — 83036 HEMOGLOBIN GLYCOSYLATED A1C: CPT | Performed by: NURSE PRACTITIONER

## 2024-04-28 PROCEDURE — 85027 COMPLETE CBC AUTOMATED: CPT | Performed by: INTERNAL MEDICINE

## 2024-04-28 RX ORDER — DEXTROSE MONOHYDRATE 100 MG/ML
INJECTION, SOLUTION INTRAVENOUS CONTINUOUS PRN
Status: DISCONTINUED | OUTPATIENT
Start: 2024-04-28 | End: 2024-04-30

## 2024-04-28 RX ADMIN — LEVOTHYROXINE SODIUM 112 MCG: 112 TABLET ORAL at 08:11

## 2024-04-28 RX ADMIN — CARBAMAZEPINE 150 MG: 100 SUSPENSION ORAL at 23:13

## 2024-04-28 RX ADMIN — BRIVARACETAM 100 MG: 10 SOLUTION ORAL at 08:11

## 2024-04-28 RX ADMIN — HYDROCORTISONE SODIUM SUCCINATE 50 MG: 100 INJECTION, POWDER, FOR SOLUTION INTRAMUSCULAR; INTRAVENOUS at 06:37

## 2024-04-28 RX ADMIN — CEFEPIME 2 G: 2 INJECTION, POWDER, FOR SOLUTION INTRAVENOUS at 12:00

## 2024-04-28 RX ADMIN — CARBAMAZEPINE 150 MG: 100 SUSPENSION ORAL at 14:30

## 2024-04-28 RX ADMIN — METRONIDAZOLE 500 MG: 500 INJECTION, SOLUTION INTRAVENOUS at 02:05

## 2024-04-28 RX ADMIN — VANCOMYCIN HYDROCHLORIDE 1500 MG: 10 INJECTION, POWDER, LYOPHILIZED, FOR SOLUTION INTRAVENOUS at 21:37

## 2024-04-28 RX ADMIN — CEFEPIME 2 G: 2 INJECTION, POWDER, FOR SOLUTION INTRAVENOUS at 02:06

## 2024-04-28 RX ADMIN — CARBAMAZEPINE 150 MG: 100 SUSPENSION ORAL at 05:58

## 2024-04-28 RX ADMIN — SODIUM CHLORIDE, POTASSIUM CHLORIDE, SODIUM LACTATE AND CALCIUM CHLORIDE: 600; 310; 30; 20 INJECTION, SOLUTION INTRAVENOUS at 12:03

## 2024-04-28 RX ADMIN — CARBAMAZEPINE 100 MG: 100 SUSPENSION ORAL at 17:40

## 2024-04-28 RX ADMIN — Medication 15 ML: at 08:11

## 2024-04-28 RX ADMIN — HYDROCORTISONE SODIUM SUCCINATE 50 MG: 100 INJECTION, POWDER, FOR SOLUTION INTRAMUSCULAR; INTRAVENOUS at 14:29

## 2024-04-28 RX ADMIN — HUMAN ALBUMIN MICROSPHERES AND PERFLUTREN 9 ML: 10; .22 INJECTION, SOLUTION INTRAVENOUS at 11:34

## 2024-04-28 RX ADMIN — OXYCODONE HYDROCHLORIDE AND ACETAMINOPHEN 1000 MG: 500 TABLET ORAL at 08:11

## 2024-04-28 RX ADMIN — METRONIDAZOLE 500 MG: 500 INJECTION, SOLUTION INTRAVENOUS at 17:41

## 2024-04-28 RX ADMIN — ENOXAPARIN SODIUM 40 MG: 40 INJECTION SUBCUTANEOUS at 15:00

## 2024-04-28 RX ADMIN — CYANOCOBALAMIN TAB 1000 MCG 1000 MCG: 1000 TAB at 08:24

## 2024-04-28 RX ADMIN — POTASSIUM & SODIUM PHOSPHATES POWDER PACK 280-160-250 MG 1 PACKET: 280-160-250 PACK at 08:12

## 2024-04-28 RX ADMIN — CEFEPIME 2 G: 2 INJECTION, POWDER, FOR SOLUTION INTRAVENOUS at 19:30

## 2024-04-28 RX ADMIN — HYDROCORTISONE SODIUM SUCCINATE 50 MG: 100 INJECTION, POWDER, FOR SOLUTION INTRAMUSCULAR; INTRAVENOUS at 21:33

## 2024-04-28 RX ADMIN — Medication 20 MG: at 08:11

## 2024-04-28 RX ADMIN — Medication 100 MCG: at 08:11

## 2024-04-28 RX ADMIN — BRIVARACETAM 100 MG: 10 SOLUTION ORAL at 19:59

## 2024-04-28 RX ADMIN — METRONIDAZOLE 500 MG: 500 INJECTION, SOLUTION INTRAVENOUS at 10:51

## 2024-04-28 ASSESSMENT — ACTIVITIES OF DAILY LIVING (ADL)
ADLS_ACUITY_SCORE: 67
WEAR_GLASSES_OR_BLIND: NO
TOILETING_ISSUES: YES
HEARING_DIFFICULTY_OR_DEAF: NO
ADLS_ACUITY_SCORE: 67
DRESSING/BATHING_DIFFICULTY: YES
ADLS_ACUITY_SCORE: 67
DRESSING/BATHING: BATHING DIFFICULTY, DEPENDENT
ADLS_ACUITY_SCORE: 67
ADLS_ACUITY_SCORE: 67
EATING/SWALLOWING: EATING;SWALLOWING LIQUIDS;SWALLOWING SOLID FOOD
TOILETING_ASSISTANCE: TOILETING DIFFICULTY, DEPENDENT
ADLS_ACUITY_SCORE: 67
ADLS_ACUITY_SCORE: 67
WALKING_OR_CLIMBING_STAIRS_DIFFICULTY: YES
ADLS_ACUITY_SCORE: 67
TOILETING: 2-->COMPLETELY DEPENDENT (NOT DEVELOPMENTALLY APPROPRIATE)
CHANGE_IN_FUNCTIONAL_STATUS_SINCE_ONSET_OF_CURRENT_ILLNESS/INJURY: NO
ADLS_ACUITY_SCORE: 67
DIFFICULTY_COMMUNICATING: YES
COMMUNICATION: DIFFICULTY SPEAKING;DIFFICULTY UNDERSTANDING
ADLS_ACUITY_SCORE: 67
ADLS_ACUITY_SCORE: 67
CONCENTRATING,_REMEMBERING_OR_MAKING_DECISIONS_DIFFICULTY: YES
FALL_HISTORY_WITHIN_LAST_SIX_MONTHS: NO
DIFFICULTY_EATING/SWALLOWING: YES
ADLS_ACUITY_SCORE: 67
WALKING_OR_CLIMBING_STAIRS: AMBULATION DIFFICULTY, DEPENDENT
DOING_ERRANDS_INDEPENDENTLY_DIFFICULTY: YES
EQUIPMENT_CURRENTLY_USED_AT_HOME: OTHER (SEE COMMENTS)
ADLS_ACUITY_SCORE: 67
ADLS_ACUITY_SCORE: 67
TOILETING: 2-->COMPLETELY DEPENDENT
ADLS_ACUITY_SCORE: 67
ADLS_ACUITY_SCORE: 67

## 2024-04-28 NOTE — PLAN OF CARE
"Shift Note 3p-7p    Pt restful, follows commands, nods and shakes head appropriately to orientation questions (\"are the lights on in the room right now?\" & \"does it usually snow in July?\") and KARIMI.  Pt off pressors earlier today.  Currently on RA, SpO2 while awake % but pt has apneic periods during sleep and Spo2 will dip to low 80s.  Pt pushes mask and cannula off face.  LS coarse, loose cough.  BS active.  TF started at 2pm and will advance to goal rate.  Primofit, adequate urine.      "

## 2024-04-28 NOTE — PROCEDURES
INSERT ARTERIAL LINE [IVT1 (Custom)]    ARTERIAL LINE INSERTION PROCEDURE NOTE  (NON-OR)     Procedure Date:  4/27/2024     Performing Physician:  Dr. Too Casillas     Pre-Procedure Diagnosis:  shock  Post-Procedure Diagnosis:  shock     Procedure:  Arterial line insertion, right femoral artery     Indications:  Shock and new stroke    Estimated Blood Loss: 10 ml    Complications: none     Clinical History:     The patient was hypotensive and the Neurology service has required that the SBP be kept greater than 110 Hg.  The patient has contractures of the right and left wrists. The right groin had a good pulse and US image of the right femoral artery. Full informed consent via telephone the patient's mother today. The risk and the benefits discussed included.  The risks of bleeding, infection and injury to the vessel was discussed with the family member who understood and wished to proceed. The ICU nurse confirmed the procedure and signed the consent form that was placed in the chart.        Procedure:       In sterile fashion, the line site was prepped with Chlorhexidine.  Strict sterile conditions were maintained, cap, mask, and sterile gloves were worn by all participants.  5 ml of Lidocaine 1% anesthetic were infiltrated into the skin.  The arterial line was placed in the right femoral artery percutaneously using US guidance.  Seldinger technique. The arterial line was sutured in place using 3-0 silk sutures. An occlusive sterile dressing was applied.       Condition: critical     Too Casillas Jr, MD, PhD
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
## 2024-04-28 NOTE — PLAN OF CARE
Problem: Adult Inpatient Plan of Care  Goal: Plan of Care Review  Description: The Plan of Care Review/Shift note should be completed every shift.  The Outcome Evaluation is a brief statement about your assessment that the patient is improving, declining, or no change.  This information will be displayed automatically on your shift  note.  Outcome: Progressing  Flowsheets (Taken 4/28/2024 1534)  Outcome Evaluation: Patient able to wean off pressors this afternoon. Mentation also improved and back to baseline per the mother. Refer to flow chart for further detail regarding adult assessment.  Overall Patient Progress: improving     Problem: Adult Inpatient Plan of Care  Goal: Absence of Hospital-Acquired Illness or Injury  Intervention: Identify and Manage Fall Risk  Recent Flowsheet Documentation  Taken 4/28/2024 1200 by Shamar Jacobsen RN  Safety Promotion/Fall Prevention:   activity supervised   assistive device/personal items within reach   clutter free environment maintained   increased rounding and observation   increase visualization of patient   lighting adjusted   patient and family education   room door open   room near nurse's station   room organization consistent   safety round/check completed   supervised activity   treat reversible contributory factors   treat underlying cause  Taken 4/28/2024 0800 by Shamar Jacobsen RN  Safety Promotion/Fall Prevention:   activity supervised   assistive device/personal items within reach   clutter free environment maintained   increased rounding and observation   increase visualization of patient   lighting adjusted   patient and family education   room door open   room near nurse's station   room organization consistent   safety round/check completed   supervised activity   treat reversible contributory factors   treat underlying cause  Intervention: Prevent Skin Injury  Recent Flowsheet Documentation  Taken 4/28/2024 1400 by Shamar Jacobsen RN  Body Position:  turned  Taken 4/28/2024 1200 by Shamar Jacobsen RN  Body Position:   turned   upper extremity elevated   weight shifting   supine, head elevated   heels elevated  Skin Protection:   adhesive use limited   incontinence pads utilized   pulse oximeter probe site changed   silicone foam dressing in place   skin to device areas padded   skin to skin areas padded  Device Skin Pressure Protection:   absorbent pad utilized/changed   positioning supports utilized   skin-to-device areas padded   skin-to-skin areas padded   pressure points protected   tubing/devices free from skin contact  Taken 4/28/2024 1000 by Shamar Jacobsen RN  Body Position: turned  Taken 4/28/2024 0800 by Shamar Jacobsen RN  Body Position:   turned   left   upper extremity elevated   weight shifting   supine, head elevated   heels elevated  Skin Protection:   adhesive use limited   incontinence pads utilized   pulse oximeter probe site changed   silicone foam dressing in place   skin to device areas padded   skin to skin areas padded  Device Skin Pressure Protection:   absorbent pad utilized/changed   positioning supports utilized   skin-to-device areas padded   skin-to-skin areas padded   pressure points protected   tubing/devices free from skin contact  Intervention: Prevent and Manage VTE (Venous Thromboembolism) Risk  Recent Flowsheet Documentation  Taken 4/28/2024 1200 by Shamar Jacobsen RN  VTE Prevention/Management: SCDs (sequential compression devices) on  Taken 4/28/2024 0800 by Shamar Jacobsen RN  VTE Prevention/Management: SCDs (sequential compression devices) on  Intervention: Prevent Infection  Recent Flowsheet Documentation  Taken 4/28/2024 1200 by Shamar Jacobsen RN  Infection Prevention:   single patient room provided   visitors restricted/screened   rest/sleep promoted   personal protective equipment utilized   hand hygiene promoted   equipment surfaces disinfected   environmental surveillance performed  Taken 4/28/2024 0800 by  Shamar Jacobsen RN  Infection Prevention:   single patient room provided   visitors restricted/screened   rest/sleep promoted   personal protective equipment utilized   hand hygiene promoted   equipment surfaces disinfected   environmental surveillance performed  Goal: Optimal Comfort and Wellbeing  Intervention: Provide Person-Centered Care  Recent Flowsheet Documentation  Taken 4/28/2024 1200 by Shamar Jacobsen RN  Trust Relationship/Rapport:   care explained   choices provided   emotional support provided   reassurance provided  Taken 4/28/2024 0800 by Shamar Jacobsen RN  Trust Relationship/Rapport:   care explained   choices provided   emotional support provided   reassurance provided     Problem: Skin Injury Risk Increased  Goal: Skin Health and Integrity  Intervention: Plan: Nurse Driven Intervention: Positioning  Recent Flowsheet Documentation  Taken 4/28/2024 1200 by Shamar Jacobsen RN  Plan: Positioning Interventions:   REPOSITION Left/Right (No supine) q2h   HOB 30 degrees or less   OFF-LOAD HEELS with pillows  Taken 4/28/2024 0800 by Shamar Jacobsen RN  Plan: Positioning Interventions:   REPOSITION Left/Right (No supine) q2h   HOB 30 degrees or less   OFF-LOAD HEELS with pillows  Intervention: Plan: Nurse Driven Intervention: Moisture Management  Recent Flowsheet Documentation  Taken 4/28/2024 1200 by Shamar Jacobsen RN  Moisture Interventions:   Encourage regular toileting   No brief in bed   Incontinence pad   Urinary collection device   Perineal cleanser  Bathing/Skin Care:   bath, partial   incontinence care   linen changed  Taken 4/28/2024 0800 by Shamar Jacobsen RN  Moisture Interventions:   Encourage regular toileting   No brief in bed   Incontinence pad   Urinary collection device   Perineal cleanser  Bathing/Skin Care:   bath, partial   incontinence care   linen changed  Intervention: Plan: Nurse Driven Intervention: Friction and Shear  Recent Flowsheet Documentation  Taken 4/28/2024 1200  by Shamar Jacobsen RN  Friction/Shear Interventions:   HOB 30 degrees or less   Silicone foam sacral dressing   Assistive lifting device (portable/ceiling lift, etc.)   Lateral transfer device (hovermat, etc.)   Repositioning device (TAP system, etc.)  Taken 4/28/2024 0800 by Shamar Jacobsen RN  Friction/Shear Interventions:   HOB 30 degrees or less   Silicone foam sacral dressing   Assistive lifting device (portable/ceiling lift, etc.)   Lateral transfer device (hovermat, etc.)   Repositioning device (TAP system, etc.)  Intervention: Optimize Skin Protection  Recent Flowsheet Documentation  Taken 4/28/2024 1200 by Shamar Jacobsen RN  Pressure Reduction Techniques:   frequent weight shift encouraged   heels elevated off bed   positioned off wounds   pressure points protected   weight shift assistance provided  Pressure Reduction Devices: positioning supports utilized  Skin Protection:   adhesive use limited   incontinence pads utilized   pulse oximeter probe site changed   silicone foam dressing in place   skin to device areas padded   skin to skin areas padded  Activity Management:   bedrest   activity adjusted per tolerance  Head of Bed (HOB) Positioning: HOB at 30 degrees  Taken 4/28/2024 0800 by Shamar Jacobsen RN  Pressure Reduction Techniques:   frequent weight shift encouraged   heels elevated off bed   positioned off wounds   pressure points protected   weight shift assistance provided  Pressure Reduction Devices: positioning supports utilized  Skin Protection:   adhesive use limited   incontinence pads utilized   pulse oximeter probe site changed   silicone foam dressing in place   skin to device areas padded   skin to skin areas padded  Activity Management:   bedrest   activity adjusted per tolerance  Head of Bed (HOB) Positioning: HOB at 30 degrees     Problem: Infection  Goal: Absence of Infection Signs and Symptoms  Intervention: Prevent or Manage Infection  Recent Flowsheet Documentation  Taken  4/28/2024 1200 by Shamar Jacobsen, RN  Isolation Precautions: contact precautions maintained  Taken 4/28/2024 0800 by Shamar Jacobsen, RN  Isolation Precautions: contact precautions maintained   Goal Outcome Evaluation:           Overall Patient Progress: improvingOverall Patient Progress: improving    Outcome Evaluation: Patient able to wean off pressors this afternoon. Mentation also improved and back to baseline per the mother. Refer to flow chart for further detail regarding adult assessment.
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
## 2024-04-28 NOTE — PROCEDURES
INSERT ARTERIAL LINE [IVT1 (Custom)]    Central LINE INSERTION PROCEDURE NOTE  (NON-OR)     Procedure Date:  4/27/2024     Performing Physician:  Dr. Too Casillas     Pre-Procedure Diagnosis:  shock and CVA  Post-Procedure Diagnosis:  shock and CVA     Procedure:  Arterial line insertion, right internal jugular vein     Indications:  Shock and new stroke    Estimated Blood Loss: 10 ml    Complications: none     Clinical History:     The patient was hypotensive and the Neurology service has required that the SBP be kept greater than 110 Hg.  The patient has contractures of the right and left wrists. The right neck had a good US image of the right internal jugular vein.  Full informed consent via telephone the patient's mother today. The risks and the benefits discussed included the risks of bleeding, infection, possible pneumothorax, and injury to the vessel were discussed with the family member who understood and wished to proceed. The ICU nurse confirmed the procedure and signed the consent form that was placed in the chart.        Procedure:       The right next was clipped to remove a very small amount of the patient's beard. The line site at the right neck was prepped with Chlorhexidine.  Strict sterile conditions were maintained, cap, mask, and sterile gloves were worn by all participants.  5 ml of   Lidocaine 1% anesthetic were infiltrated into the skin.  The central venous catheter line was placed in right internal jugular vein percutaneously using US guidance.  Seldinger technique. The right internal jugular venous line was sutured in place using 3-0 silk sutures. An occlusive sterile dressing was applied.  The CXR demonstrated that the line was in good position and there was no pneumothorax.       Condition: critical     Too Casillas Jr, MD, PhD

## 2024-04-28 NOTE — PLAN OF CARE
Oxygen: 2L oxymask  Continuous Drips: Levophed, LR @ 125 mL/h  RASS: +1/-1  Pain/CPOT: Denies  Mobility: Moves all 4 extremities, spastic hemiplegia  Restraints: NA  Lines: R femoral a-line placed and RIJ CVC placed  Lerma: External Catheter, making adequate urine  Skin: Mepilex is on, sacral redness, redness and flakiness of facial skin  Diet/Bowel: Last BM 4/28, NPO, PEG tube  DVT/GI Prophylaxis: Lovenox, protonix, PCDs on  Glucose: Last- 164  Antibiotics: Vancomycin, cefepime, Flagyl  Events: No acute changes overnight  Social: Mother/guardian Savannah updated at bedside        Problem: Infection  Goal: Absence of Infection Signs and Symptoms  4/28/2024 0512 by Danyelle Cruz RN  Outcome: Progressing  4/28/2024 0511 by Danyelle Cruz RN  Outcome: Progressing  Intervention: Prevent or Manage Infection  Recent Flowsheet Documentation  Taken 4/28/2024 0400 by Danyelle Cruz RN  Isolation Precautions: contact precautions maintained  Taken 4/28/2024 0000 by Danyelle Cruz RN  Isolation Precautions: contact precautions maintained  Taken 4/27/2024 2000 by Danyelle Cruz RN  Isolation Precautions: contact precautions maintained   Goal Outcome Evaluation:

## 2024-04-29 LAB
ANION GAP SERPL CALCULATED.3IONS-SCNC: 9 MMOL/L (ref 7–15)
ATRIAL RATE - MUSE: 79 BPM
ATRIAL RATE - MUSE: 80 BPM
BUN SERPL-MCNC: 11.6 MG/DL (ref 8–23)
CALCIUM SERPL-MCNC: 8.2 MG/DL (ref 8.8–10.2)
CHLORIDE SERPL-SCNC: 114 MMOL/L (ref 98–107)
CREAT SERPL-MCNC: 0.72 MG/DL (ref 0.67–1.17)
DEPRECATED HCO3 PLAS-SCNC: 25 MMOL/L (ref 22–29)
DIASTOLIC BLOOD PRESSURE - MUSE: NORMAL MMHG
DIASTOLIC BLOOD PRESSURE - MUSE: NORMAL MMHG
EGFRCR SERPLBLD CKD-EPI 2021: >90 ML/MIN/1.73M2
ERYTHROCYTE [DISTWIDTH] IN BLOOD BY AUTOMATED COUNT: 14.9 % (ref 10–15)
GLUCOSE BLDC GLUCOMTR-MCNC: 164 MG/DL (ref 70–99)
GLUCOSE SERPL-MCNC: 126 MG/DL (ref 70–99)
HCT VFR BLD AUTO: 36 % (ref 40–53)
HGB BLD-MCNC: 11.3 G/DL (ref 13.3–17.7)
INTERPRETATION ECG - MUSE: NORMAL
INTERPRETATION ECG - MUSE: NORMAL
LACTATE SERPL-SCNC: 0.8 MMOL/L (ref 0.7–2)
MAGNESIUM SERPL-MCNC: 1.8 MG/DL (ref 1.7–2.3)
MCH RBC QN AUTO: 27 PG (ref 26.5–33)
MCHC RBC AUTO-ENTMCNC: 31.4 G/DL (ref 31.5–36.5)
MCV RBC AUTO: 86 FL (ref 78–100)
P AXIS - MUSE: 62 DEGREES
P AXIS - MUSE: 62 DEGREES
PHOSPHATE SERPL-MCNC: 2.1 MG/DL (ref 2.5–4.5)
PLATELET # BLD AUTO: 174 10E3/UL (ref 150–450)
POTASSIUM SERPL-SCNC: 3.5 MMOL/L (ref 3.4–5.3)
PR INTERVAL - MUSE: 168 MS
PR INTERVAL - MUSE: 170 MS
QRS DURATION - MUSE: 84 MS
QRS DURATION - MUSE: 88 MS
QT - MUSE: 400 MS
QT - MUSE: 410 MS
QTC - MUSE: 461 MS
QTC - MUSE: 470 MS
R AXIS - MUSE: -44 DEGREES
R AXIS - MUSE: -65 DEGREES
RBC # BLD AUTO: 4.18 10E6/UL (ref 4.4–5.9)
SODIUM SERPL-SCNC: 148 MMOL/L (ref 135–145)
SYSTOLIC BLOOD PRESSURE - MUSE: NORMAL MMHG
SYSTOLIC BLOOD PRESSURE - MUSE: NORMAL MMHG
T AXIS - MUSE: 30 DEGREES
T AXIS - MUSE: 58 DEGREES
VANCOMYCIN SERPL-MCNC: 13.8 UG/ML
VENTRICULAR RATE- MUSE: 79 BPM
VENTRICULAR RATE- MUSE: 80 BPM
WBC # BLD AUTO: 10.1 10E3/UL (ref 4–11)

## 2024-04-29 PROCEDURE — 250N000011 HC RX IP 250 OP 636: Mod: JZ | Performed by: PHYSICIAN ASSISTANT

## 2024-04-29 PROCEDURE — 99233 SBSQ HOSP IP/OBS HIGH 50: CPT | Performed by: HOSPITALIST

## 2024-04-29 PROCEDURE — 80202 ASSAY OF VANCOMYCIN: CPT | Performed by: INTERNAL MEDICINE

## 2024-04-29 PROCEDURE — 250N000013 HC RX MED GY IP 250 OP 250 PS 637: Performed by: PHYSICIAN ASSISTANT

## 2024-04-29 PROCEDURE — 250N000013 HC RX MED GY IP 250 OP 250 PS 637: Performed by: SURGERY

## 2024-04-29 PROCEDURE — 250N000011 HC RX IP 250 OP 636: Performed by: PHYSICIAN ASSISTANT

## 2024-04-29 PROCEDURE — G0463 HOSPITAL OUTPT CLINIC VISIT: HCPCS

## 2024-04-29 PROCEDURE — 258N000003 HC RX IP 258 OP 636: Performed by: INTERNAL MEDICINE

## 2024-04-29 PROCEDURE — 200N000001 HC R&B ICU

## 2024-04-29 PROCEDURE — 84100 ASSAY OF PHOSPHORUS: CPT | Performed by: HOSPITALIST

## 2024-04-29 PROCEDURE — 99291 CRITICAL CARE FIRST HOUR: CPT | Performed by: INTERNAL MEDICINE

## 2024-04-29 PROCEDURE — 258N000003 HC RX IP 258 OP 636: Performed by: HOSPITALIST

## 2024-04-29 PROCEDURE — 83605 ASSAY OF LACTIC ACID: CPT | Performed by: INTERNAL MEDICINE

## 2024-04-29 PROCEDURE — 80048 BASIC METABOLIC PNL TOTAL CA: CPT | Performed by: HOSPITALIST

## 2024-04-29 PROCEDURE — 85027 COMPLETE CBC AUTOMATED: CPT | Performed by: INTERNAL MEDICINE

## 2024-04-29 PROCEDURE — 250N000011 HC RX IP 250 OP 636: Mod: JZ | Performed by: INTERNAL MEDICINE

## 2024-04-29 PROCEDURE — 250N000011 HC RX IP 250 OP 636

## 2024-04-29 PROCEDURE — 250N000013 HC RX MED GY IP 250 OP 250 PS 637: Performed by: INTERNAL MEDICINE

## 2024-04-29 PROCEDURE — 250N000009 HC RX 250: Performed by: SURGERY

## 2024-04-29 PROCEDURE — 83735 ASSAY OF MAGNESIUM: CPT | Performed by: SURGERY

## 2024-04-29 PROCEDURE — 258N000003 HC RX IP 258 OP 636

## 2024-04-29 RX ORDER — SODIUM CHLORIDE 9 MG/ML
INJECTION, SOLUTION INTRAVENOUS CONTINUOUS
Status: DISCONTINUED | OUTPATIENT
Start: 2024-04-29 | End: 2024-04-30

## 2024-04-29 RX ORDER — MIDODRINE HYDROCHLORIDE 2.5 MG/1
2.5 TABLET ORAL
Status: DISCONTINUED | OUTPATIENT
Start: 2024-04-29 | End: 2024-05-01

## 2024-04-29 RX ADMIN — CARBAMAZEPINE 150 MG: 100 SUSPENSION ORAL at 23:00

## 2024-04-29 RX ADMIN — POTASSIUM & SODIUM PHOSPHATES POWDER PACK 280-160-250 MG 1 PACKET: 280-160-250 PACK at 11:51

## 2024-04-29 RX ADMIN — Medication 20 MG: at 06:44

## 2024-04-29 RX ADMIN — HYDROCORTISONE SODIUM SUCCINATE 50 MG: 100 INJECTION, POWDER, FOR SOLUTION INTRAMUSCULAR; INTRAVENOUS at 21:44

## 2024-04-29 RX ADMIN — ENOXAPARIN SODIUM 40 MG: 40 INJECTION SUBCUTANEOUS at 15:47

## 2024-04-29 RX ADMIN — POTASSIUM & SODIUM PHOSPHATES POWDER PACK 280-160-250 MG 1 PACKET: 280-160-250 PACK at 08:09

## 2024-04-29 RX ADMIN — POTASSIUM & SODIUM PHOSPHATES POWDER PACK 280-160-250 MG 1 PACKET: 280-160-250 PACK at 06:43

## 2024-04-29 RX ADMIN — MIDODRINE HYDROCHLORIDE 2.5 MG: 2.5 TABLET ORAL at 17:47

## 2024-04-29 RX ADMIN — CARBAMAZEPINE 150 MG: 100 SUSPENSION ORAL at 11:51

## 2024-04-29 RX ADMIN — CEFEPIME 2 G: 2 INJECTION, POWDER, FOR SOLUTION INTRAVENOUS at 11:50

## 2024-04-29 RX ADMIN — CARBAMAZEPINE 150 MG: 100 SUSPENSION ORAL at 05:33

## 2024-04-29 RX ADMIN — CEFEPIME 2 G: 2 INJECTION, POWDER, FOR SOLUTION INTRAVENOUS at 19:37

## 2024-04-29 RX ADMIN — HYDROCORTISONE SODIUM SUCCINATE 50 MG: 100 INJECTION, POWDER, FOR SOLUTION INTRAMUSCULAR; INTRAVENOUS at 13:24

## 2024-04-29 RX ADMIN — MIDODRINE HYDROCHLORIDE 2.5 MG: 2.5 TABLET ORAL at 13:25

## 2024-04-29 RX ADMIN — SODIUM CHLORIDE: 900 INJECTION INTRAVENOUS at 19:37

## 2024-04-29 RX ADMIN — HYDROCORTISONE SODIUM SUCCINATE 50 MG: 100 INJECTION, POWDER, FOR SOLUTION INTRAMUSCULAR; INTRAVENOUS at 05:54

## 2024-04-29 RX ADMIN — CYANOCOBALAMIN TAB 1000 MCG 1000 MCG: 1000 TAB at 08:09

## 2024-04-29 RX ADMIN — SODIUM CHLORIDE, POTASSIUM CHLORIDE, SODIUM LACTATE AND CALCIUM CHLORIDE: 600; 310; 30; 20 INJECTION, SOLUTION INTRAVENOUS at 06:14

## 2024-04-29 RX ADMIN — BRIVARACETAM 100 MG: 10 SOLUTION ORAL at 08:09

## 2024-04-29 RX ADMIN — OXYCODONE HYDROCHLORIDE AND ACETAMINOPHEN 1000 MG: 500 TABLET ORAL at 08:09

## 2024-04-29 RX ADMIN — LEVOTHYROXINE SODIUM 112 MCG: 112 TABLET ORAL at 06:44

## 2024-04-29 RX ADMIN — METRONIDAZOLE 500 MG: 500 INJECTION, SOLUTION INTRAVENOUS at 02:21

## 2024-04-29 RX ADMIN — CEFEPIME 2 G: 2 INJECTION, POWDER, FOR SOLUTION INTRAVENOUS at 04:03

## 2024-04-29 RX ADMIN — Medication 15 ML: at 08:09

## 2024-04-29 RX ADMIN — NOREPINEPHRINE BITARTRATE 0.03 MCG/KG/MIN: 0.02 INJECTION, SOLUTION INTRAVENOUS at 09:03

## 2024-04-29 RX ADMIN — VANCOMYCIN HYDROCHLORIDE 1500 MG: 10 INJECTION, POWDER, LYOPHILIZED, FOR SOLUTION INTRAVENOUS at 21:44

## 2024-04-29 RX ADMIN — CARBAMAZEPINE 100 MG: 100 SUSPENSION ORAL at 17:47

## 2024-04-29 RX ADMIN — Medication 100 MCG: at 08:09

## 2024-04-29 RX ADMIN — BRIVARACETAM 100 MG: 10 SOLUTION ORAL at 19:45

## 2024-04-29 RX ADMIN — POTASSIUM & SODIUM PHOSPHATES POWDER PACK 280-160-250 MG 1 PACKET: 280-160-250 PACK at 15:47

## 2024-04-29 ASSESSMENT — ACTIVITIES OF DAILY LIVING (ADL)
ADLS_ACUITY_SCORE: 67

## 2024-04-29 NOTE — DISCHARGE INSTRUCTIONS
"Sacrum/coccyx/inner buttock:    1. FIC incontinent episode with sarah clean and protect.   2. Apply mepilex sacral on odd days and prn to sacrum   3. Time and date dressing change  4. Critic-aid barrier paste to inner buttocks prn      PIP:   Reposition pt side to side liza when in bed, every 2 hours-get the pt way over on side to completely offload pressure. This will benefit skin and respiratory function   2.  Keep heels elevated and floating on pillows at all times. Try using at least 2 pillows under each calf.  3.  When up to the chair pt needs to fully offload every 2 hours and use a chair cushion if needed         Pressure Injury Prevention (PIP) Plan:  If patient is declining pressure injury prevention interventions: Explore reason why and address patient's concerns, Educate on pressure injury risk and prevention intervention(s), If patient is still declining, document \"informed refusal\" , and Ensure Care team is aware ( provider, charge nurse, etc)  Mattress: Follow bed algorithm, reassess daily and order specialty mattress, if indicated.  HOB: Maintain at or below 30 degrees, unless contraindicated  Repositioning in bed: Every 1-2 hours , Left/right positioning; avoid supine, and Raise foot of bed prior to raising head of bed, to reduce patient sliding down (shear)  Heels: Keep elevated off mattress, Pillows under calves, Heel lift boots, and Primary RN to obtain Prevalon heel boots from  #[id number] order 2 boots one for each foot and apply when in bed  Protective Dressing: Triad paste to sacrum ( #[id number])  Positioning Equipment: Z-Aguila positioner (#[id number]-medium or #[id number]) to help maintain side lying position.  Chair positioning: Chair cushion (#[id number]) , Assist patient to reposition hourly, and Do NOT use a donut for sitting (this increases pressure to smaller area and creates a higher potential for injury)    If patient has a buttock pressure injury, or high risk for PI use chair cushion or " SPS.  Moisture Management: Perineal cleansing /protection: Follow Incontinence Protocol, Avoid brief in bed, Clean and dry skin folds with bathing , Consider InterDry (#[id number]) between folds, and Moisturize dry skin  Under Devices: Inspect skin under all medical devices during skin inspection , Ensure tubes are stabilized without tension, and Ensure patient is not lying on medical devices or equipment when repositioned  Ask provider to discontinue device when no longer needed.
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
## 2024-04-29 NOTE — PLAN OF CARE
Problem: Adult Inpatient Plan of Care  Goal: Plan of Care Review  Description: The Plan of Care Review/Shift note should be completed every shift.  The Outcome Evaluation is a brief statement about your assessment that the patient is improving, declining, or no change.  This information will be displayed automatically on your shift  note.  Flowsheets (Taken 4/29/2024 1521)  Outcome Evaluation: pt able to wean off pressors-only two times restarted for only 30 minutes due to low bps. hospitalist informed and order parameters changed. pt started on midodrine. pt had BM. pt having adequate urine output via primofit. pt often refusing oral care. pt seen by [facility] nurse of reddened skin. pt vitals otherwise WNL. pt mitt remains on for pt picking at central line. mother visiting at bedside and updated. pt having no neuro changes.  Plan of Care Reviewed With: patient  Overall Patient Progress: improving  Goal: Absence of Hospital-Acquired Illness or Injury  Intervention: Prevent Skin Injury  Recent Flowsheet Documentation  Taken 4/29/2024 1400 by Aubree Osman RN  Body Position:   turned   heels elevated  Taken 4/29/2024 1200 by Aubree Osman RN  Body Position:   turned   heels elevated  Skin Protection:   adhesive use limited   incontinence pads utilized   pulse oximeter probe site changed   silicone foam dressing in place   skin to device areas padded   skin to skin areas padded  Device Skin Pressure Protection:   absorbent pad utilized/changed   positioning supports utilized   skin-to-device areas padded   skin-to-skin areas padded   pressure points protected   tubing/devices free from skin contact  Taken 4/29/2024 1000 by Aubree Osman RN  Body Position:   turned   heels elevated  Taken 4/29/2024 0800 by Aubree Osman RN  Body Position:   turned   heels elevated  Skin Protection:   adhesive use limited   incontinence pads utilized   pulse oximeter probe site changed   silicone foam dressing in place   skin to  device areas padded   skin to skin areas padded  Device Skin Pressure Protection:   absorbent pad utilized/changed   positioning supports utilized   skin-to-device areas padded   skin-to-skin areas padded   pressure points protected   tubing/devices free from skin contact  Intervention: Prevent and Manage VTE (Venous Thromboembolism) Risk  Recent Flowsheet Documentation  Taken 4/29/2024 1200 by Aubree Osman RN  VTE Prevention/Management: SCDs (sequential compression devices) on  Taken 4/29/2024 0800 by Aubree Osman RN  VTE Prevention/Management: SCDs (sequential compression devices) on  Goal: Optimal Comfort and Wellbeing  Intervention: Provide Person-Centered Care  Recent Flowsheet Documentation  Taken 4/29/2024 1200 by Aubree Osman RN  Trust Relationship/Rapport:   care explained   choices provided   emotional support provided   reassurance provided  Taken 4/29/2024 0800 by Aubree Osman RN  Trust Relationship/Rapport:   care explained   choices provided   emotional support provided   reassurance provided     Problem: Skin Injury Risk Increased  Goal: Skin Health and Integrity  Intervention: Plan: Nurse Driven Intervention: Positioning  Recent Flowsheet Documentation  Taken 4/29/2024 1200 by Aubree Osman RN  Plan: Positioning Interventions:   REPOSITION Left/Right (No supine) q2h   HOB 30 degrees or less   OFF-LOAD HEELS with pillows  Taken 4/29/2024 0800 by Aubree Osman RN  Plan: Positioning Interventions:   REPOSITION Left/Right (No supine) q2h   HOB 30 degrees or less   OFF-LOAD HEELS with pillows  Intervention: Plan: Nurse Driven Intervention: Moisture Management  Recent Flowsheet Documentation  Taken 4/29/2024 1200 by Aubree Osman RN  Moisture Interventions:   Encourage regular toileting   No brief in bed   Incontinence pad   Urinary collection device   Perineal cleanser  Bathing/Skin Care: incontinence care  Taken 4/29/2024 1000 by Aubree Osman RN  Bathing/Skin Care:   linen  changed   incontinence care  Taken 4/29/2024 0800 by Aubree Osman RN  Moisture Interventions:   Encourage regular toileting   No brief in bed   Incontinence pad   Urinary collection device   Perineal cleanser  Intervention: Plan: Nurse Driven Intervention: Friction and Shear  Recent Flowsheet Documentation  Taken 4/29/2024 1200 by Aubree Osman RN  Friction/Shear Interventions:   HOB 30 degrees or less   Silicone foam sacral dressing   Assistive lifting device (portable/ceiling lift, etc.)   Lateral transfer device (hovermat, etc.)   Repositioning device (TAP system, etc.)  Taken 4/29/2024 0800 by Aubree Osman RN  Friction/Shear Interventions:   HOB 30 degrees or less   Silicone foam sacral dressing   Assistive lifting device (portable/ceiling lift, etc.)   Lateral transfer device (hovermat, etc.)   Repositioning device (TAP system, etc.)  Intervention: Optimize Skin Protection  Recent Flowsheet Documentation  Taken 4/29/2024 1400 by Aubree Osman RN  Head of Bed (HOB) Positioning: HOB at 30 degrees  Taken 4/29/2024 1200 by Aubree Osman RN  Pressure Reduction Techniques:   frequent weight shift encouraged   heels elevated off bed   positioned off wounds   pressure points protected   weight shift assistance provided  Pressure Reduction Devices: positioning supports utilized  Skin Protection:   adhesive use limited   incontinence pads utilized   pulse oximeter probe site changed   silicone foam dressing in place   skin to device areas padded   skin to skin areas padded  Activity Management: bedrest  Head of Bed (HOB) Positioning: HOB at 30 degrees  Taken 4/29/2024 1000 by Aubree Osman RN  Head of Bed (HOB) Positioning: HOB at 30 degrees  Taken 4/29/2024 0800 by Aubree Osman RN  Pressure Reduction Techniques:   frequent weight shift encouraged   heels elevated off bed   positioned off wounds   pressure points protected   weight shift assistance provided  Pressure Reduction Devices: positioning  supports utilized  Skin Protection:   adhesive use limited   incontinence pads utilized   pulse oximeter probe site changed   silicone foam dressing in place   skin to device areas padded   skin to skin areas padded  Activity Management: bedrest  Head of Bed (HOB) Positioning: HOB at 30 degrees     Problem: Sepsis/Septic Shock  Goal: Absence of Infection Signs and Symptoms  Intervention: Promote Recovery  Recent Flowsheet Documentation  Taken 4/29/2024 1200 by Aubree Osman RN  Activity Management: bedrest  Taken 4/29/2024 0800 by Aubree Osman RN  Activity Management: bedrest   Goal Outcome Evaluation:      Plan of Care Reviewed With: patient    Overall Patient Progress: improvingOverall Patient Progress: improving    Outcome Evaluation: pt able to wean off pressors-only two times restarted for only 30 minutes due to low bps. hospitalist informed and order parameters changed. pt started on midodrine. pt had BM. pt having adequate urine output via primofit. pt often refusing oral care. pt seen by [facility] nurse of reddened skin. pt vitals otherwise WNL. pt mitt remains on for pt picking at central line. mother visiting at bedside and updated. pt having no neuro changes. Pt possibly transfer out of unit tomorrow depending on blood pressures. Per mother pt much more awake today and seems more himself.

## 2024-04-29 NOTE — PLAN OF CARE
Problem: Sepsis/Septic Shock  Goal: Optimal Coping  4/29/2024 0624 by Tito Gibbons, RN  Outcome: Progressing  4/29/2024 0624 by Tito Gibbons RN  Outcome: Progressing  Intervention: Optimize Psychosocial Adjustment to Illness  Recent Flowsheet Documentation  Taken 4/28/2024 2000 by Tito Gibbons, RN  Family/Support System Care:   caregiver stress acknowledged   presence promoted   self-care encouraged   support provided  Goal: Absence of Bleeding  4/29/2024 0624 by Tito Gibbons, RN  Outcome: Progressing  4/29/2024 0624 by Tito Gibbons, RN  Outcome: Progressing  Goal: Blood Glucose Level Within Targeted Range  4/29/2024 0624 by Tito Gibbons RN  Outcome: Progressing  4/29/2024 0624 by Tito Gibbons, RN  Outcome: Progressing  Goal: Absence of Infection Signs and Symptoms  4/29/2024 0624 by Tito Gibbons, RN  Outcome: Progressing  4/29/2024 0624 by Tito Gibbons, RN  Outcome: Progressing  Intervention: Promote Recovery  Recent Flowsheet Documentation  Taken 4/29/2024 0400 by Tito Gibbons, RN  Activity Management: bedrest  Taken 4/29/2024 0000 by Tito Gibbons, RN  Activity Management: bedrest  Taken 4/28/2024 2000 by Tito Gibbons, RN  Activity Management: bedrest  Goal: Optimal Nutrition Intake  4/29/2024 0624 by Tito Gibbons, RN  Outcome: Progressing  4/29/2024 0624 by Tito Gibbons, RN  Outcome: Progressing          Goal Outcome Evaluation: in progress     Overall Patient Progress: improving     Alert, nonverbal, cues y/n questions, SR/SB, pressors off since 0300h, on RA, episodic apnea/desaturation when asleep-O2 added, TF increased to 35cc/hr, audible borborygmi, no bm, external cath-adequate uop,  MASD redness at perineum/sacrum. Plan: transfer to MS floor if no need to restart pressors/clinically stable.
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
## 2024-04-30 LAB
ANION GAP SERPL CALCULATED.3IONS-SCNC: 9 MMOL/L (ref 7–15)
BUN SERPL-MCNC: 13.2 MG/DL (ref 8–23)
CALCIUM SERPL-MCNC: 7.5 MG/DL (ref 8.8–10.2)
CHLORIDE SERPL-SCNC: 110 MMOL/L (ref 98–107)
CREAT SERPL-MCNC: 0.67 MG/DL (ref 0.67–1.17)
DEPRECATED HCO3 PLAS-SCNC: 25 MMOL/L (ref 22–29)
EGFRCR SERPLBLD CKD-EPI 2021: >90 ML/MIN/1.73M2
ERYTHROCYTE [DISTWIDTH] IN BLOOD BY AUTOMATED COUNT: 15 % (ref 10–15)
GLUCOSE BLDC GLUCOMTR-MCNC: 107 MG/DL (ref 70–99)
GLUCOSE BLDC GLUCOMTR-MCNC: 122 MG/DL (ref 70–99)
GLUCOSE BLDC GLUCOMTR-MCNC: 151 MG/DL (ref 70–99)
GLUCOSE BLDC GLUCOMTR-MCNC: 152 MG/DL (ref 70–99)
GLUCOSE BLDC GLUCOMTR-MCNC: 158 MG/DL (ref 70–99)
GLUCOSE SERPL-MCNC: 163 MG/DL (ref 70–99)
HCT VFR BLD AUTO: 33.5 % (ref 40–53)
HGB BLD-MCNC: 10.9 G/DL (ref 13.3–17.7)
MAGNESIUM SERPL-MCNC: 1.8 MG/DL (ref 1.7–2.3)
MCH RBC QN AUTO: 27.7 PG (ref 26.5–33)
MCHC RBC AUTO-ENTMCNC: 32.5 G/DL (ref 31.5–36.5)
MCV RBC AUTO: 85 FL (ref 78–100)
PHOSPHATE SERPL-MCNC: 2.6 MG/DL (ref 2.5–4.5)
PLATELET # BLD AUTO: 163 10E3/UL (ref 150–450)
POTASSIUM SERPL-SCNC: 3.6 MMOL/L (ref 3.4–5.3)
RBC # BLD AUTO: 3.94 10E6/UL (ref 4.4–5.9)
SODIUM SERPL-SCNC: 144 MMOL/L (ref 135–145)
WBC # BLD AUTO: 5.9 10E3/UL (ref 4–11)

## 2024-04-30 PROCEDURE — 83735 ASSAY OF MAGNESIUM: CPT | Performed by: INTERNAL MEDICINE

## 2024-04-30 PROCEDURE — 120N000001 HC R&B MED SURG/OB

## 2024-04-30 PROCEDURE — 85027 COMPLETE CBC AUTOMATED: CPT | Performed by: HOSPITALIST

## 2024-04-30 PROCEDURE — 250N000011 HC RX IP 250 OP 636: Mod: JZ | Performed by: HOSPITALIST

## 2024-04-30 PROCEDURE — 250N000011 HC RX IP 250 OP 636: Performed by: HOSPITALIST

## 2024-04-30 PROCEDURE — 99232 SBSQ HOSP IP/OBS MODERATE 35: CPT | Performed by: HOSPITALIST

## 2024-04-30 PROCEDURE — 250N000013 HC RX MED GY IP 250 OP 250 PS 637: Performed by: HOSPITALIST

## 2024-04-30 PROCEDURE — 250N000011 HC RX IP 250 OP 636: Performed by: PHYSICIAN ASSISTANT

## 2024-04-30 PROCEDURE — 80048 BASIC METABOLIC PNL TOTAL CA: CPT | Performed by: HOSPITALIST

## 2024-04-30 PROCEDURE — 84100 ASSAY OF PHOSPHORUS: CPT | Performed by: SURGERY

## 2024-04-30 PROCEDURE — 250N000011 HC RX IP 250 OP 636: Mod: JZ | Performed by: PHYSICIAN ASSISTANT

## 2024-04-30 PROCEDURE — 250N000013 HC RX MED GY IP 250 OP 250 PS 637: Performed by: PHYSICIAN ASSISTANT

## 2024-04-30 PROCEDURE — 250N000013 HC RX MED GY IP 250 OP 250 PS 637: Performed by: INTERNAL MEDICINE

## 2024-04-30 RX ADMIN — LEVOTHYROXINE SODIUM 112 MCG: 112 TABLET ORAL at 10:05

## 2024-04-30 RX ADMIN — POTASSIUM & SODIUM PHOSPHATES POWDER PACK 280-160-250 MG 1 PACKET: 280-160-250 PACK at 10:05

## 2024-04-30 RX ADMIN — BRIVARACETAM 100 MG: 10 SOLUTION ORAL at 10:24

## 2024-04-30 RX ADMIN — CARBAMAZEPINE 150 MG: 100 SUSPENSION ORAL at 05:25

## 2024-04-30 RX ADMIN — CEFEPIME 2 G: 2 INJECTION, POWDER, FOR SOLUTION INTRAVENOUS at 21:11

## 2024-04-30 RX ADMIN — CARBAMAZEPINE 100 MG: 100 SUSPENSION ORAL at 17:51

## 2024-04-30 RX ADMIN — BRIVARACETAM 100 MG: 10 SOLUTION ORAL at 21:10

## 2024-04-30 RX ADMIN — CARBAMAZEPINE 150 MG: 100 SUSPENSION ORAL at 14:15

## 2024-04-30 RX ADMIN — OXYCODONE HYDROCHLORIDE AND ACETAMINOPHEN 1000 MG: 500 TABLET ORAL at 10:05

## 2024-04-30 RX ADMIN — ENOXAPARIN SODIUM 40 MG: 40 INJECTION SUBCUTANEOUS at 17:50

## 2024-04-30 RX ADMIN — HYDROCORTISONE SODIUM SUCCINATE 50 MG: 100 INJECTION, POWDER, FOR SOLUTION INTRAMUSCULAR; INTRAVENOUS at 05:25

## 2024-04-30 RX ADMIN — Medication 15 ML: at 10:05

## 2024-04-30 RX ADMIN — CEFEPIME 2 G: 2 INJECTION, POWDER, FOR SOLUTION INTRAVENOUS at 14:06

## 2024-04-30 RX ADMIN — MIDODRINE HYDROCHLORIDE 2.5 MG: 2.5 TABLET ORAL at 14:16

## 2024-04-30 RX ADMIN — HYDROCORTISONE SODIUM SUCCINATE 50 MG: 100 INJECTION, POWDER, FOR SOLUTION INTRAMUSCULAR; INTRAVENOUS at 21:10

## 2024-04-30 RX ADMIN — Medication 100 MCG: at 10:05

## 2024-04-30 RX ADMIN — Medication 20 MG: at 10:04

## 2024-04-30 RX ADMIN — MIDODRINE HYDROCHLORIDE 2.5 MG: 2.5 TABLET ORAL at 10:16

## 2024-04-30 RX ADMIN — MIDODRINE HYDROCHLORIDE 2.5 MG: 2.5 TABLET ORAL at 17:50

## 2024-04-30 RX ADMIN — CEFEPIME 2 G: 2 INJECTION, POWDER, FOR SOLUTION INTRAVENOUS at 03:26

## 2024-04-30 RX ADMIN — CYANOCOBALAMIN TAB 1000 MCG 1000 MCG: 1000 TAB at 10:05

## 2024-04-30 ASSESSMENT — ACTIVITIES OF DAILY LIVING (ADL)
ADLS_ACUITY_SCORE: 63
ADLS_ACUITY_SCORE: 60
ADLS_ACUITY_SCORE: 63
ADLS_ACUITY_SCORE: 63
ADLS_ACUITY_SCORE: 60
ADLS_ACUITY_SCORE: 63
ADLS_ACUITY_SCORE: 60
ADLS_ACUITY_SCORE: 63
ADLS_ACUITY_SCORE: 60
ADLS_ACUITY_SCORE: 63
DEPENDENT_IADLS:: CLEANING;COOKING;LAUNDRY;SHOPPING;MEAL PREPARATION;MEDICATION MANAGEMENT;MONEY MANAGEMENT;TRANSPORTATION;INCONTINENCE
ADLS_ACUITY_SCORE: 60
ADLS_ACUITY_SCORE: 63

## 2024-04-30 NOTE — PLAN OF CARE
Problem: Skin Injury Risk Increased  Goal: Skin Health and Integrity  Outcome: Progressing  Intervention: Plan: Nurse Driven Intervention: Positioning  Recent Flowsheet Documentation  Taken 4/30/2024 0400 by Laney Amaya RN  Plan: Positioning Interventions:   REPOSITION Left/Right (No supine) q2h   HOB 30 degrees or less   OFF-LOAD HEELS with pillows   Use wedge positioners  Taken 4/30/2024 0000 by Laney Amaya RN  Plan: Positioning Interventions:   REPOSITION Left/Right (No supine) q2h   HOB 30 degrees or less   OFF-LOAD HEELS with pillows   Use wedge positioners  Taken 4/29/2024 2000 by Laney Amaya RN  Plan: Positioning Interventions:   REPOSITION Left/Right (No supine) q2h   HOB 30 degrees or less   OFF-LOAD HEELS with pillows   Use wedge positioners  Intervention: Plan: Nurse Driven Intervention: Moisture Management  Recent Flowsheet Documentation  Taken 4/30/2024 0400 by Laney Amaya RN  Moisture Interventions:   Encourage regular toileting   No brief in bed   Incontinence pad   Urinary collection device   Perineal cleanser  Taken 4/30/2024 0000 by Laney Amaya RN  Moisture Interventions:   Encourage regular toileting   No brief in bed   Incontinence pad   Urinary collection device   Perineal cleanser  Taken 4/29/2024 2200 by Laney Amaya RN  Bathing/Skin Care:   back care   bath, chlorhexidine wipes   wipes, CHG   dressed/undressed   electrode patches/site rotation   incontinence care   linen changed   foot care   moisturizer applied  Taken 4/29/2024 2000 by Laney Amaya RN  Moisture Interventions:   Encourage regular toileting   No brief in bed   Incontinence pad   Urinary collection device   Perineal cleanser  Bathing/Skin Care: (face washed) --  Intervention: Plan: Nurse Driven Intervention: Friction and Shear  Recent Flowsheet Documentation  Taken 4/30/2024 0400 by Laney Amaya RN  Friction/Shear Interventions:   HOB 30 degrees or less   Silicone foam sacral dressing   Assistive lifting device (portable/ceiling  lift, etc.)   Lateral transfer device (hovermat, etc.)   Repositioning device (TAP system, etc.)  Taken 4/30/2024 0000 by Laney Amaya RN  Friction/Shear Interventions:   HOB 30 degrees or less   Silicone foam sacral dressing   Assistive lifting device (portable/ceiling lift, etc.)   Lateral transfer device (hovermat, etc.)   Repositioning device (TAP system, etc.)  Taken 4/29/2024 2000 by Laney Amaya RN  Friction/Shear Interventions:   HOB 30 degrees or less   Silicone foam sacral dressing   Assistive lifting device (portable/ceiling lift, etc.)   Lateral transfer device (hovermat, etc.)   Repositioning device (TAP system, etc.)  Intervention: Optimize Skin Protection  Recent Flowsheet Documentation  Taken 4/30/2024 0600 by Laney Amaya RN  Head of Bed ([facility]) Positioning: HOB at 30 degrees  Taken 4/30/2024 0400 by Laney Amaya RN  Pressure Reduction Techniques:   frequent weight shift encouraged   heels elevated off bed   weight shift assistance provided  Pressure Reduction Devices:   positioning supports utilized   pressure-redistributing mattress utilized  Skin Protection:   adhesive use limited   incontinence pads utilized   pulse oximeter probe site changed   silicone foam dressing in place   skin to device areas padded   skin to skin areas padded  Activity Management: bedrest  Head of Bed ([facility]) Positioning: HOB at 30 degrees  Taken 4/30/2024 0200 by Laney Amaya RN  Head of Bed ([facility]) Positioning: HOB at 30 degrees  Taken 4/30/2024 0000 by Laney Amaya RN  Pressure Reduction Techniques:   frequent weight shift encouraged   heels elevated off bed   weight shift assistance provided  Pressure Reduction Devices:   positioning supports utilized   pressure-redistributing mattress utilized  Skin Protection:   adhesive use limited   incontinence pads utilized   pulse oximeter probe site changed   silicone foam dressing in place   skin to device areas padded   skin to skin areas padded  Activity Management: bedrest  Head of  Bed ([facility]) Positioning: HOB at 30 degrees  Taken 4/29/2024 2200 by Laney Amaya RN  Head of Bed ([facility]) Positioning: HOB at 30 degrees  Taken 4/29/2024 2000 by Laney Amaya RN  Pressure Reduction Techniques:   frequent weight shift encouraged   heels elevated off bed   weight shift assistance provided  Pressure Reduction Devices:   positioning supports utilized   pressure-redistributing mattress utilized  Skin Protection:   adhesive use limited   incontinence pads utilized   pulse oximeter probe site changed   silicone foam dressing in place   skin to device areas padded   skin to skin areas padded  Activity Management: bedrest  Head of Bed ([facility]) Positioning: HOB at 30 degrees     Problem: Sepsis/Septic Shock  Goal: Blood Glucose Level Within Targeted Range  Outcome: Progressing  Intervention: Optimize Glycemic Control  Recent Flowsheet Documentation  Taken 4/30/2024 0400 by Laney Amaya RN  Glycemic Management: blood glucose monitored  Taken 4/30/2024 0000 by Laney Amaya RN  Glycemic Management: blood glucose monitored  Taken 4/29/2024 2000 by Laney Amaya RN  Glycemic Management: blood glucose monitored  Goal: Optimal Nutrition Intake  Outcome: Progressing  Intervention: Optimize Nutrition Delivery  Recent Flowsheet Documentation  Taken 4/30/2024 0400 by Laney Amaya RN  Nutrition Support Management: weight trending reviewed  Taken 4/30/2024 0000 by Laney Amaya RN  Nutrition Support Management: weight trending reviewed  Taken 4/29/2024 2000 by Laney Amaya RN  Nutrition Support Management: weight trending reviewed     Problem: Adult Inpatient Plan of Care  Goal: Plan of Care Review  Description: The Plan of Care Review/Shift note should be completed every shift.  The Outcome Evaluation is a brief statement about your assessment that the patient is improving, declining, or no change.  This information will be displayed automatically on your shift  note.  Flowsheets (Taken 4/30/2024 0539)  Plan of Care Reviewed With:  patient  Overall Patient Progress: improving  Goal: Absence of Hospital-Acquired Illness or Injury  Intervention: Identify and Manage Fall Risk  Recent Flowsheet Documentation  Taken 4/30/2024 0400 by Laney Amaya RN  Safety Promotion/Fall Prevention:   clutter free environment maintained   increased rounding and observation   lighting adjusted   room door open  Taken 4/30/2024 0000 by Laney Amaya RN  Safety Promotion/Fall Prevention:   clutter free environment maintained   increased rounding and observation   lighting adjusted   room door open  Taken 4/29/2024 2000 by Laney Amaya RN  Safety Promotion/Fall Prevention:   clutter free environment maintained   increased rounding and observation   lighting adjusted   room door open  Intervention: Prevent Skin Injury  Recent Flowsheet Documentation  Taken 4/30/2024 0600 by Laney Amaya RN  Body Position:   left   turned   heels elevated   legs elevated   side-lying 30 degrees   upper extremity elevated   weight shifting  Taken 4/30/2024 0400 by Laney Amaya RN  Body Position:   right   turned   heels elevated   side-lying 30 degrees   upper extremity elevated   weight shifting  Skin Protection:   adhesive use limited   incontinence pads utilized   pulse oximeter probe site changed   silicone foam dressing in place   skin to device areas padded   skin to skin areas padded  Device Skin Pressure Protection:   absorbent pad utilized/changed   positioning supports utilized   skin-to-device areas padded   skin-to-skin areas padded   pressure points protected   tubing/devices free from skin contact  Taken 4/30/2024 0200 by Laney Amaya RN  Body Position:   left   turned   heels elevated   legs elevated   upper extremity elevated   weight shifting  Taken 4/30/2024 0000 by Laney Amaya RN  Body Position:   right   turned  Skin Protection:   adhesive use limited   incontinence pads utilized   pulse oximeter probe site changed   silicone foam dressing in place   skin to device areas  padded   skin to skin areas padded  Device Skin Pressure Protection:   absorbent pad utilized/changed   positioning supports utilized   skin-to-device areas padded   skin-to-skin areas padded   pressure points protected   tubing/devices free from skin contact  Taken 4/29/2024 2200 by Laney Amaya RN  Body Position:   left   turned   heels elevated   legs elevated   side-lying 30 degrees   upper extremity elevated   weight shifting  Taken 4/29/2024 2000 by Laney Amaya RN  Body Position:   right   turned   legs elevated   heels elevated   side-lying 30 degrees   weight shifting   upper extremity elevated  Skin Protection:   adhesive use limited   incontinence pads utilized   pulse oximeter probe site changed   silicone foam dressing in place   skin to device areas padded   skin to skin areas padded  Device Skin Pressure Protection:   absorbent pad utilized/changed   positioning supports utilized   skin-to-device areas padded   skin-to-skin areas padded   pressure points protected   tubing/devices free from skin contact  Intervention: Prevent and Manage VTE (Venous Thromboembolism) Risk  Recent Flowsheet Documentation  Taken 4/30/2024 0400 by Laney Amaya RN  VTE Prevention/Management: SCDs (sequential compression devices) on  Taken 4/30/2024 0000 by Laney Amaya RN  VTE Prevention/Management: SCDs (sequential compression devices) on  Taken 4/29/2024 2000 by Laney Amaya RN  VTE Prevention/Management: SCDs (sequential compression devices) on  Intervention: Prevent Infection  Recent Flowsheet Documentation  Taken 4/30/2024 0400 by Laney Amaya RN  Infection Prevention:   cohorting utilized   equipment surfaces disinfected   rest/sleep promoted   single patient room provided  Taken 4/30/2024 0000 by Laney Amaya RN  Infection Prevention:   cohorting utilized   equipment surfaces disinfected   rest/sleep promoted   single patient room provided  Taken 4/29/2024 2000 by Laney Amaya RN  Infection Prevention:   cohorting utilized    equipment surfaces disinfected   rest/sleep promoted   single patient room provided  Goal: Optimal Comfort and Wellbeing  Intervention: Monitor Pain and Promote Comfort  Recent Flowsheet Documentation  Taken 4/30/2024 0000 by Laney Amaya RN  Pain Management Interventions:   care clustered   music therapy   pillow support provided  Taken 4/29/2024 1930 by Laney Amaya RN  Pain Management Interventions:   pillow support provided   repositioned  Intervention: Provide Person-Centered Care  Recent Flowsheet Documentation  Taken 4/30/2024 0400 by Laney Amaya RN  Trust Relationship/Rapport:   care explained   choices provided   emotional support provided   reassurance provided  Taken 4/30/2024 0000 by Laney Amaya RN  Trust Relationship/Rapport:   care explained   choices provided   emotional support provided   reassurance provided  Taken 4/29/2024 2000 by Laney Amaya RN  Trust Relationship/Rapport:   care explained   choices provided   emotional support provided   reassurance provided     Problem: Infection  Goal: Absence of Infection Signs and Symptoms  Intervention: Prevent or Manage Infection  Recent Flowsheet Documentation  Taken 4/30/2024 0400 by Laney Amaya RN  Infection Management: aseptic technique maintained  Isolation Precautions: contact precautions maintained  Taken 4/30/2024 0000 by Laney Amaya RN  Infection Management: aseptic technique maintained  Isolation Precautions: contact precautions maintained  Taken 4/29/2024 2000 by Laney Amaya RN  Infection Management: aseptic technique maintained  Isolation Precautions: contact precautions maintained     Problem: Sepsis/Septic Shock  Goal: Optimal Coping  Intervention: Optimize Psychosocial Adjustment to Illness  Recent Flowsheet Documentation  Taken 4/30/2024 0400 by Laney Amaya RN  Supportive Measures: relaxation techniques promoted  Taken 4/30/2024 0000 by Laney Amaya RN  Supportive Measures: relaxation techniques promoted  Taken 4/29/2024 2000 by Laney Amaya  RN  Supportive Measures: relaxation techniques promoted  Goal: Absence of Bleeding  Intervention: Monitor and Manage Bleeding  Recent Flowsheet Documentation  Taken 4/30/2024 0400 by Laney Amaya RN  Bleeding Precautions:   blood pressure closely monitored   gentle oral care promoted  Taken 4/30/2024 0000 by Laney Amaya RN  Bleeding Precautions:   blood pressure closely monitored   gentle oral care promoted  Taken 4/29/2024 2000 by Laney Amaya RN  Bleeding Precautions:   blood pressure closely monitored   gentle oral care promoted  Goal: Absence of Infection Signs and Symptoms  Intervention: Initiate Sepsis Management  Recent Flowsheet Documentation  Taken 4/30/2024 0400 by Laney Amaya RN  Infection Prevention:   cohorting utilized   equipment surfaces disinfected   rest/sleep promoted   single patient room provided  Infection Management: aseptic technique maintained  Isolation Precautions: contact precautions maintained  Taken 4/30/2024 0000 by Laney Amaya RN  Infection Prevention:   cohorting utilized   equipment surfaces disinfected   rest/sleep promoted   single patient room provided  Infection Management: aseptic technique maintained  Isolation Precautions: contact precautions maintained  Taken 4/29/2024 2000 by Laney Amaya RN  Infection Prevention:   cohorting utilized   equipment surfaces disinfected   rest/sleep promoted   single patient room provided  Infection Management: aseptic technique maintained  Isolation Precautions: contact precautions maintained  Intervention: Promote Recovery  Recent Flowsheet Documentation  Taken 4/30/2024 0400 by Laney Amaya RN  Sleep/Rest Enhancement:   awakenings minimized   music provided   comfort measures   therapeutic touch utilized   room darkened   relaxation techniques promoted  Activity Management: bedrest  Taken 4/30/2024 0000 by Laney Amaya RN  Sleep/Rest Enhancement:   awakenings minimized   music provided   comfort measures   therapeutic touch utilized   room  darkened   relaxation techniques promoted  Activity Management: bedrest  Taken 4/29/2024 2000 by Laney Amaya RN  Sleep/Rest Enhancement:   awakenings minimized   music provided   comfort measures   therapeutic touch utilized   room darkened   relaxation techniques promoted  Activity Management: bedrest   Goal Outcome Evaluation:      Plan of Care Reviewed With: patient    Overall Patient Progress: improvingOverall Patient Progress: improving     Patient observed sleeping throughout the shift, no s/s of distress or discomfort observed or reported.Appropriate interventions for patient's safety remain in place, including appropriate PPE.Episodes of apnea noted, with O2 desaturation to 75% for few seconds, refuses to wear supplemental oxygen. Patient frequently refusing oral cares by shaking his head and biting sponges off.BP WNL.Adequate UOP via primofit.

## 2024-04-30 NOTE — PHARMACY-VANCOMYCIN DOSING SERVICE
Pharmacy Vancomycin Note  Date of Service 2024  Patient's  1960   63 year old, male    Indication: Healthcare-Associated Pneumonia  Day of Therapy: 3  Current vancomycin regimen:  1500 mg IV q24h  Current vancomycin monitoring method: AUC  Current vancomycin therapeutic monitoring goal: 400-600 mg*h/L    [employer] Prediction of Current Vancomycin Regimen  Regimen: 1500 mg IV every 24 hours.  Start time: 21:37 on 2024  Exposure target: AUC24 (range)400-600 mg/L.hr   AUC24,ss: 445 mg/L.hr  Probability of AUC24 > 400: 79 %  Ctrough,ss: 11.7 mg/L  Probability of Ctrough,ss > 20: 0 %  Probability of nephrotoxicity (Lodise ERNESTO ): 7 %      Current estimated CrCl = Estimated Creatinine Clearance: 104.5 mL/min (based on SCr of 0.72 mg/dL).    Creatinine for last 3 days  2024:  8:12 AM Creatinine 1.25 mg/dL; 12:29 PM Creatinine 1.11 mg/dL; 12:29 PM Creatinine 1.11 mg/dL  2024:  4:19 AM Creatinine 0.79 mg/dL  2024:  5:37 AM Creatinine 0.72 mg/dL    Recent Vancomycin Levels (past 3 days)  2024:  7:55 PM Vancomycin 13.8 ug/mL    Vancomycin IV Administrations (past 72 hours)                     vancomycin (VANCOCIN) 1,500 mg in 0.9% NaCl 250 mL intermittent infusion (mg) 1,500 mg New Bag 247     1,500 mg New Bag 24    vancomycin (VANCOCIN) 1,750 mg in 0.9% NaCl 500 mL intermittent infusion (mg) 1,750 mg Given 24 1204                    Nephrotoxins and other renal medications (From now, onward)      Start     Dose/Rate Route Frequency Ordered Stop    24 2300  norepinephrine (LEVOPHED) 4 mg in  mL infusion PREMIX         0.01-0.6 mcg/kg/min × 66.8 kg  2.5-150.3 mL/hr  Intravenous CONTINUOUS 24 2258      24 220  vancomycin (VANCOCIN) 1,500 mg in 0.9% NaCl 250 mL intermittent infusion         1,500 mg  over 2 Hours Intravenous EVERY 24 HOURS 24 1704                 Contrast Orders - past 72 hours (72h ago, onward)      Start      "Dose/Rate Route Frequency Stop    04/28/24 1200  perflutren diluted 1mL to 2mL with saline (OPTISON) diluted injection 9 mL         9 mL Intravenous ONCE 04/28/24 1134    04/27/24 1830  iopamidol (ISOVUE-370) solution 117 mL        Placed in \"And\" Linked Group    117 mL Intravenous ONCE 04/27/24 1817    04/27/24 0945  iopamidol (ISOVUE-370) solution 75 mL  Status:  Discontinued         75 mL Intravenous ONCE 04/27/24 0945            Interpretation of levels and current regimen:  Vancomycin level is reflective of -600    Has serum creatinine changed greater than 50% in last 72 hours: No    Urine output:  good urine output    Renal Function: Improving    [employer] Prediction of Planned New Vancomycin Regimen  Regimen: 1500 mg IV every 24 hours.  Start time: 21:37 on 04/29/2024  Exposure target: AUC24 (range)400-600 mg/L.hr   AUC24,ss: 445 mg/L.hr  Probability of AUC24 > 400: 79 %  Ctrough,ss: 11.7 mg/L  Probability of Ctrough,ss > 20: 0 %  Probability of nephrotoxicity (Lodise ERNESTO 2009): 7 %      Plan:  Continue Current Dose  Vancomycin monitoring method: AUC  Vancomycin therapeutic monitoring goal: 400-600 mg*h/L  Pharmacy will check vancomycin levels as appropriate in 1-3 Days.  Serum creatinine levels will be ordered a minimum of twice weekly.    Adarsh Washington, [facility]    "
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
## 2024-04-30 NOTE — CONSULTS
Care Management Initial Consult    General Information  Assessment completed with: Care Team Member, Parents, Savannah-motherBisi  [phone]  Type of CM/SW Visit: Initial Assessment    Primary Care Provider verified and updated as needed: Yes   Readmission within the last 30 days: other (see comments), unable to assess (readmitted with same dx asp pneumonia)      Reason for Consult: discharge planning  Advance Care Planning:            Communication Assessment  Patient's communication style: spoken language (English or Bilingual)    Hearing Difficulty or Deaf: no   Wear Glasses or Blind: no    Cognitive  Cognitive/Neuro/Behavioral: .WDL except  Level of Consciousness: alert  Arousal Level: opens eyes spontaneously  Orientation: other (see comments) (HECTOR - Nonverbal)  Mood/Behavior: calm, cooperative  Best Language: 2 - Severe aphasia (Baseline)  Speech: other (see comments) (HECTOR - Nonverbal)    Living Environment:   People in home: parent(s), other (see comments) (PCA)  Savannah Abel Keyon  Current living Arrangements: house      Able to return to prior arrangements: yes       Family/Social Support:  Care provided by: parent(s), other (see comments) (PCA, S services)  Provides care for: no one, unable/limited ability to care for self  Marital Status: Single  Parent(s), Other (specify) (PCA)          Description of Support System: Supportive, Involved         Current Resources:   Patient receiving home care services:       Community Resources: County Programs, County Worker  Equipment currently used at home: hospital bed, lift device, shower chair, wheelchair, manual  Supplies currently used at home:      Employment/Financial:  Employment Status: disabled        Financial Concerns:             Does the patient's insurance plan have a 3 day qualifying hospital stay waiver?  No    Lifestyle & Psychosocial Needs:  Social Determinants of Health     Food Insecurity: Not on file   Depression: Not at risk  (4/1/2024)    PHQ-2     PHQ-2 Score: 1   Housing Stability: Not on file   Tobacco Use: Medium Risk (4/5/2024)    Patient History     Smoking Tobacco Use: Former     Smokeless Tobacco Use: Never     Passive Exposure: Not on file   Financial Resource Strain: Not on file   Alcohol Use: Not on file   Transportation Needs: Not on file   Physical Activity: Not on file   Interpersonal Safety: Not on file   Stress: Not on file   Social Connections: Not on file   Health Literacy: Not on file       Functional Status:  Prior to admission patient needed assistance:   Dependent ADLs:: Bathing, Dressing, Eating, Grooming, Incontinence, Positioning, Transfers, Wheelchair-with assist, Toileting  Dependent IADLs:: Cleaning, Cooking, Laundry, Shopping, Meal Preparation, Medication Management, Money Management, Transportation, Incontinence       Mental Health Status:          Chemical Dependency Status:                Values/Beliefs:  Spiritual, Cultural Beliefs, Orthodoxy Practices, Values that affect care: yes (Protestant)               Additional Information:  Per consult for discharge planning and chart review indicating patient has a Guardian, called pt's mother/guardian Savannah. Per Savannah, patient is dependent with all his cares and requires a lift and assist of 2 with transfers.  Savannah shared that she bought a handicap van last year and is able to occasionally get pt out to his medical appts but usually pt has Virtual appts. Patient has TFs and that [facility] supplies formula and supplies for pt's TFs.  Per Savannah, pt is not on oxygen or cpap at home.   Savannah shared that pt doesn't tolerated having a mask on his face as he will always pull it off.  Per Savannah, pt has 12 1/2 hours of PCA services and she and Keyon shared in PCA hours.  Per Savannah, Keyon lives with them and is with patient at night. Savannah shared that patient is in need of more services and would like to get Skilled RN services as she has been doing the  wound care for patient and would like a increase in PCA hours.  Discussed if pt has County Worker to discuss increase in services with.   Savannah shared that Bisi is pt's  and provided her number [phone].  Discussed having home care RN, PT/OT & HHA at discharge and Savannah was in agreement with this plan.  Referral sent to [facility].  Called [facility] .  Per Bisi, pt had a Quality visit in March.  Per Bisi, pt's has PCA & IHS services and receives the following services.  5 hours of home making services weekly, 6 hours of night supervision each day, 20 hours per week of 1:1 with training, 25 hours per week of without training- cares.  Per Bisi, in addition to PCA services, Keyon covers the 20 hours of non trained  hours per week.  Bisi shared that she has had multiple conversations with Savannah in regards to adding Skilled RN services and changing some of pt's IHS hours to home making.  Per Bisi, Savannah has declined to add or change current services.  Informed Bisi that referral being sent for Skilled RN, PT/OT,speech & HHA.    Bisi requested a call when pt is discharging with confirmation of HC agency.  If pt's isn't able to get an accepting home care agency, she will get pt a Skilled RN for patient.  Referral sent to [facility].    Inpatient Care Coordinator will continue to follow for discharge planning.   Simona Pierson, RN  Simona Pierson RN, BSN, OCN   Inpatient Care Coordination [facility]  Office: [phone]

## 2024-05-01 ENCOUNTER — APPOINTMENT (OUTPATIENT)
Dept: INTERVENTIONAL RADIOLOGY/VASCULAR | Facility: CLINIC | Age: 64
DRG: 871 | End: 2024-05-01
Attending: HOSPITALIST
Payer: MEDICARE

## 2024-05-01 LAB
ANION GAP SERPL CALCULATED.3IONS-SCNC: 7 MMOL/L (ref 7–15)
BACTERIA BLD CULT: ABNORMAL
BUN SERPL-MCNC: 13.1 MG/DL (ref 8–23)
CALCIUM SERPL-MCNC: 8 MG/DL (ref 8.8–10.2)
CHLORIDE SERPL-SCNC: 106 MMOL/L (ref 98–107)
CREAT SERPL-MCNC: 0.68 MG/DL (ref 0.67–1.17)
DEPRECATED HCO3 PLAS-SCNC: 29 MMOL/L (ref 22–29)
EGFRCR SERPLBLD CKD-EPI 2021: >90 ML/MIN/1.73M2
ERYTHROCYTE [DISTWIDTH] IN BLOOD BY AUTOMATED COUNT: 14.9 % (ref 10–15)
GLUCOSE BLDC GLUCOMTR-MCNC: 144 MG/DL (ref 70–99)
GLUCOSE BLDC GLUCOMTR-MCNC: 145 MG/DL (ref 70–99)
GLUCOSE BLDC GLUCOMTR-MCNC: 182 MG/DL (ref 70–99)
GLUCOSE BLDC GLUCOMTR-MCNC: 84 MG/DL (ref 70–99)
GLUCOSE SERPL-MCNC: 150 MG/DL (ref 70–99)
HCT VFR BLD AUTO: 38.3 % (ref 40–53)
HGB BLD-MCNC: 12.3 G/DL (ref 13.3–17.7)
MAGNESIUM SERPL-MCNC: 2.1 MG/DL (ref 1.7–2.3)
MCH RBC QN AUTO: 27.2 PG (ref 26.5–33)
MCHC RBC AUTO-ENTMCNC: 32.1 G/DL (ref 31.5–36.5)
MCV RBC AUTO: 85 FL (ref 78–100)
PHOSPHATE SERPL-MCNC: 2.4 MG/DL (ref 2.5–4.5)
PLATELET # BLD AUTO: 167 10E3/UL (ref 150–450)
POTASSIUM SERPL-SCNC: 3.5 MMOL/L (ref 3.4–5.3)
RBC # BLD AUTO: 4.53 10E6/UL (ref 4.4–5.9)
SODIUM SERPL-SCNC: 142 MMOL/L (ref 135–145)
WBC # BLD AUTO: 6 10E3/UL (ref 4–11)

## 2024-05-01 PROCEDURE — 83735 ASSAY OF MAGNESIUM: CPT | Performed by: INTERNAL MEDICINE

## 2024-05-01 PROCEDURE — 250N000013 HC RX MED GY IP 250 OP 250 PS 637: Performed by: HOSPITALIST

## 2024-05-01 PROCEDURE — 80048 BASIC METABOLIC PNL TOTAL CA: CPT | Performed by: HOSPITALIST

## 2024-05-01 PROCEDURE — 85014 HEMATOCRIT: CPT | Performed by: HOSPITALIST

## 2024-05-01 PROCEDURE — 250N000011 HC RX IP 250 OP 636: Performed by: HOSPITALIST

## 2024-05-01 PROCEDURE — 250N000009 HC RX 250: Performed by: RADIOLOGY

## 2024-05-01 PROCEDURE — 36415 COLL VENOUS BLD VENIPUNCTURE: CPT | Performed by: HOSPITALIST

## 2024-05-01 PROCEDURE — 84100 ASSAY OF PHOSPHORUS: CPT | Performed by: INTERNAL MEDICINE

## 2024-05-01 PROCEDURE — 0D2DXUZ CHANGE FEEDING DEVICE IN LOWER INTESTINAL TRACT, EXTERNAL APPROACH: ICD-10-PCS | Performed by: RADIOLOGY

## 2024-05-01 PROCEDURE — 120N000001 HC R&B MED SURG/OB

## 2024-05-01 PROCEDURE — 99232 SBSQ HOSP IP/OBS MODERATE 35: CPT | Performed by: HOSPITALIST

## 2024-05-01 PROCEDURE — C1769 GUIDE WIRE: HCPCS

## 2024-05-01 PROCEDURE — 49452 REPLACE G-J TUBE PERC: CPT

## 2024-05-01 RX ORDER — LEVOTHYROXINE SODIUM 112 UG/1
112 TABLET ORAL
Status: DISCONTINUED | OUTPATIENT
Start: 2024-05-02 | End: 2024-05-05 | Stop reason: HOSPADM

## 2024-05-01 RX ORDER — MIDODRINE HYDROCHLORIDE 2.5 MG/1
2.5 TABLET ORAL
Status: DISCONTINUED | OUTPATIENT
Start: 2024-05-01 | End: 2024-05-01

## 2024-05-01 RX ORDER — LIDOCAINE HYDROCHLORIDE 20 MG/ML
JELLY TOPICAL ONCE
Status: COMPLETED | OUTPATIENT
Start: 2024-05-01 | End: 2024-05-01

## 2024-05-01 RX ADMIN — LIDOCAINE HYDROCHLORIDE 6 ML: 20 JELLY TOPICAL at 11:22

## 2024-05-01 RX ADMIN — Medication 100 MCG: at 08:05

## 2024-05-01 RX ADMIN — POTASSIUM & SODIUM PHOSPHATES POWDER PACK 280-160-250 MG 1 PACKET: 280-160-250 PACK at 17:41

## 2024-05-01 RX ADMIN — CARBAMAZEPINE 100 MG: 100 SUSPENSION ORAL at 17:41

## 2024-05-01 RX ADMIN — CEFEPIME 2 G: 2 INJECTION, POWDER, FOR SOLUTION INTRAVENOUS at 20:10

## 2024-05-01 RX ADMIN — CYANOCOBALAMIN TAB 1000 MCG 1000 MCG: 1000 TAB at 08:05

## 2024-05-01 RX ADMIN — CEFEPIME 2 G: 2 INJECTION, POWDER, FOR SOLUTION INTRAVENOUS at 04:04

## 2024-05-01 RX ADMIN — Medication 15 ML: at 08:08

## 2024-05-01 RX ADMIN — OXYCODONE HYDROCHLORIDE AND ACETAMINOPHEN 1000 MG: 500 TABLET ORAL at 08:05

## 2024-05-01 RX ADMIN — POTASSIUM & SODIUM PHOSPHATES POWDER PACK 280-160-250 MG 1 PACKET: 280-160-250 PACK at 21:24

## 2024-05-01 RX ADMIN — BRIVARACETAM 100 MG: 10 SOLUTION ORAL at 20:20

## 2024-05-01 RX ADMIN — LEVOTHYROXINE SODIUM 112 MCG: 112 TABLET ORAL at 05:45

## 2024-05-01 RX ADMIN — POTASSIUM & SODIUM PHOSPHATES POWDER PACK 280-160-250 MG 1 PACKET: 280-160-250 PACK at 08:04

## 2024-05-01 RX ADMIN — BRIVARACETAM 100 MG: 10 SOLUTION ORAL at 08:05

## 2024-05-01 RX ADMIN — CARBAMAZEPINE 150 MG: 100 SUSPENSION ORAL at 05:45

## 2024-05-01 RX ADMIN — CARBAMAZEPINE 150 MG: 100 SUSPENSION ORAL at 14:38

## 2024-05-01 RX ADMIN — ENOXAPARIN SODIUM 40 MG: 40 INJECTION SUBCUTANEOUS at 16:26

## 2024-05-01 RX ADMIN — CEFEPIME 2 G: 2 INJECTION, POWDER, FOR SOLUTION INTRAVENOUS at 11:52

## 2024-05-01 RX ADMIN — HYDROCORTISONE 15 MG: 10 TABLET ORAL at 09:21

## 2024-05-01 RX ADMIN — Medication 20 MG: at 05:45

## 2024-05-01 RX ADMIN — HYDROCORTISONE 7.5 MG: 5 TABLET ORAL at 16:25

## 2024-05-01 RX ADMIN — CARBAMAZEPINE 150 MG: 100 SUSPENSION ORAL at 00:26

## 2024-05-01 ASSESSMENT — ACTIVITIES OF DAILY LIVING (ADL)
ADLS_ACUITY_SCORE: 67
ADLS_ACUITY_SCORE: 67
ADLS_ACUITY_SCORE: 63
ADLS_ACUITY_SCORE: 67
ADLS_ACUITY_SCORE: 67
ADLS_ACUITY_SCORE: 63
ADLS_ACUITY_SCORE: 59
ADLS_ACUITY_SCORE: 59
ADLS_ACUITY_SCORE: 63
ADLS_ACUITY_SCORE: 67
ADLS_ACUITY_SCORE: 63
ADLS_ACUITY_SCORE: 67
ADLS_ACUITY_SCORE: 67
ADLS_ACUITY_SCORE: 63
ADLS_ACUITY_SCORE: 67
ADLS_ACUITY_SCORE: 60
ADLS_ACUITY_SCORE: 63
ADLS_ACUITY_SCORE: 67
ADLS_ACUITY_SCORE: 63
ADLS_ACUITY_SCORE: 67

## 2024-05-01 NOTE — PLAN OF CARE
Problem: Adult Inpatient Plan of Care  Goal: Plan of Care Review  Description: The Plan of Care Review/Shift note should be completed every shift.  The Outcome Evaluation is a brief statement about your assessment that the patient is improving, declining, or no change.  This information will be displayed automatically on your shift  note.  4/30/2024 2040 by Becky Kidd RN  Outcome: Progressing  Flowsheets (Taken 4/30/2024 1600)  Outcome Evaluation: Pt on RA with congested cough. Intermittently follows commands. R hemiplegia. Sinus hector on tele. BM x1. Urine output adequate with primofit. Transfer to 66.  Plan of Care Reviewed With:   patient   guardian   parent  Overall Patient Progress: improving  4/30/2024 2040 by Becky Kidd RN  Outcome: Progressing  Flowsheets (Taken 4/30/2024 1600)  Outcome Evaluation: Pt on RA with congested cough. Intermittently follows commands. R hemiplegia. Sinus hector on tele. BM x1. Urine output adequate with primofit. Transfer to 66.  Plan of Care Reviewed With:   patient   guardian   parent  Overall Patient Progress: improving
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
## 2024-05-01 NOTE — PROGRESS NOTES
Summary:    DATE & TIME: 4/30/24 [id number]    Cognitive Concerns/ Orientation : Alert to self, non verbal  BEHAVIOR & AGGRESSION TOOL COLOR: green   ABNL VS/O2: VSS on RA  MOBILITY: total care, t/r q2h also moving self in bed.  R sided hemiplegia  PAIN MANAGMENT: denies pain by shaking head no.   DIET: NPO except tube feeding at 55/hr(goal) and q2h h2o flushes  BOWEL/BLADDER: incontinent bowel & bladder- external cath in place  ABNL LAB/BG: None this shift  DRAIN/DEVICES: Continuous tube feeding, PIV  SL on L arm  TELEMETRY RHYTHM: NSR  SKIN: Blanchable redness to coccyx /sacrum/buttocks-- mepilex in place, face peeling   TESTS/PROCEDURES: None this shift  D/C DAY/GOAL : Pending  OTHER IMPORTANT INFO: infrequent productive cough, q 2 turn

## 2024-05-01 NOTE — PLAN OF CARE
"Goal Outcome Evaluation:                  Summary:    DATE & TIME: 4/30/24 [id number]    Cognitive Concerns/ Orientation : Alert, follows direction at times- generally cooperative .  Pt will nod yes/no &give thumbs up sign for \"good, yes, ok, happy\"  BEHAVIOR & AGGRESSION TOOL COLOR: green   ABNL VS/O2: VSS on RA  MOBILITY: total care, t/r q2h also moving self in bed.  R sided hemiplegia  PAIN MANAGMENT: denies pain by shaking head no. no non verbal signs of pain  DIET: NPO. Tube feeding at 55/hr(goal) and q2h h2o flushes  BOWEL/BLADDER: incontinent- external cath on large urine outputs. No BM  ABNL LAB/BG: MG, K PHOS protocols all WDL  DRAIN/DEVICES: Peg tube to continuous feeding, PIV x2 SL on L arm  TELEMETRY RHYTHM: NSR- is bradycardic at night into the 30-40's MD aware  SKIN: Blanchable redness to coccyx /sacrum/buttocks-- mepilex in place  TESTS/PROCEDURES: ---  D/C DAY/GOAL new transfer  OTHER IMPORTANT INFO: infrequent productive cough, lungs diminished.   Old internal jugular site on R neck, and fem line site R groin both with tagaderms      "
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
## 2024-05-01 NOTE — PROGRESS NOTES
Care Management Follow Up    Length of Stay (days): 4  Expected Discharge Date: 05/03/2024     Concerns to be Addressed: discharge planning  *please notify pt mother 24 hrs prior to anticipated discharge*  Patient plan of care discussed at interdisciplinary rounds: Yes  Anticipated Discharge Disposition: Home, Home Care   - [facility] has accepted for home care at discharge   Anticipated Discharge Services: PCA  - 12 1/2 hours of PCA services shared by pt mom/guardian Savannah and a PCA named Keyon  Anticipated Discharge DME: None (continue pre-existing DME)  Patient/family educated on Medicare website which has current facility and service quality ratings: yes  Education Provided on the Discharge Plan: Yes  Patient/Family in Agreement with the Plan: yes  Referrals Placed by CM/SW: Homecare  Private pay costs discussed: Not applicable    Additional Information:    Per prior CM-RN pt guardian appreciates 24hr advance notice of discharge plan to coordinate care.    Sent message to provider; provider anticipates discharge Friday 5/3 following completion of IV abx.    Updated discharge date in EPIC.  CM team will continue to follow for discharge planning.        Brooke Leon RN, BSN, PHN  [facility]  Inpatient Care Management - FLOAT

## 2024-05-01 NOTE — PLAN OF CARE
"DATE & TIME: 5-1-2024 AM shift          Cognitive Concerns/ Orientation : Alert, follows direction at times. Pt will nod yes/no &give thumbs up sign for \"good, yes, ok, happy\"  BEHAVIOR & AGGRESSION TOOL COLOR: green              ABNL VS/O2: VSS on RA  MOBILITY: total care, t/r q2h also moving self in bed.  R sided hemiplegia  PAIN MANAGMENT: denies pain by shaking head no. Also, no non verbal signs of pain  DIET: NPO. Tube feeding at 55/hr(goal) and free water flushes at 120 ml/q2h.   BOWEL/BLADDER: incontinent- external cath, large urine outputs. No BM  ABNL LAB/BG: called lab around 1 pm and asked to come and draw pt; no lab drawn still  DRAIN/DEVICES: JG tube exchanged today, PIV x1 SL  TELEMETRY RHYTHM: n/a  SKIN: Blanchable redness to coccyx /sacrum/buttocks-- mepilex in place. Rash to groin area, cleaned with soap and water/powder applied. Old internal jugular site on R neck, open to air-bruised. Fem line site R groin, new mepi applied.   TESTS/PROCEDURES: jg tube exchange  D/C DAY/GOAL:pending, on Iv abx  OTHER IMPORTANT INFO: infrequent productive cough, lungs diminished.   "

## 2024-05-01 NOTE — IR NOTE
Interventional Radiology Intra-procedural Nursing Note    Patient Name: Keyon Farias  Medical Record Number: [id number]  Today's Date: May 1, 2024    Procedure: GJ Tube Exchange  Start time: 1122  End time: 1125  Report provided to: Susanna CHRISTINE    Note: Patient entered Interventional Radiology Suite number 2 via cart. Patient awake, alert and orientated. Assisted onto procedural table in supine position. Prepped and draped.  Dr. Dunn in room. Time out and procedure started.    Procedure well tolerated by patient without complications. Procedure end with debrief by Dr. Dunn.      Administered medication totals:  Lidocaine Gel 2% 6 mL Intradermal

## 2024-05-02 LAB
ANION GAP SERPL CALCULATED.3IONS-SCNC: 10 MMOL/L (ref 7–15)
BACTERIA BLD CULT: NO GROWTH
BUN SERPL-MCNC: 13.5 MG/DL (ref 8–23)
CALCIUM SERPL-MCNC: 8 MG/DL (ref 8.8–10.2)
CHLORIDE SERPL-SCNC: 102 MMOL/L (ref 98–107)
CREAT SERPL-MCNC: 0.73 MG/DL (ref 0.67–1.17)
DEPRECATED HCO3 PLAS-SCNC: 28 MMOL/L (ref 22–29)
EGFRCR SERPLBLD CKD-EPI 2021: >90 ML/MIN/1.73M2
ERYTHROCYTE [DISTWIDTH] IN BLOOD BY AUTOMATED COUNT: 14.8 % (ref 10–15)
GLUCOSE BLDC GLUCOMTR-MCNC: 113 MG/DL (ref 70–99)
GLUCOSE BLDC GLUCOMTR-MCNC: 135 MG/DL (ref 70–99)
GLUCOSE SERPL-MCNC: 105 MG/DL (ref 70–99)
HCT VFR BLD AUTO: 39.7 % (ref 40–53)
HGB BLD-MCNC: 12.8 G/DL (ref 13.3–17.7)
MAGNESIUM SERPL-MCNC: 2.1 MG/DL (ref 1.7–2.3)
MCH RBC QN AUTO: 27.1 PG (ref 26.5–33)
MCHC RBC AUTO-ENTMCNC: 32.2 G/DL (ref 31.5–36.5)
MCV RBC AUTO: 84 FL (ref 78–100)
PHOSPHATE SERPL-MCNC: 3 MG/DL (ref 2.5–4.5)
PLATELET # BLD AUTO: 167 10E3/UL (ref 150–450)
POTASSIUM SERPL-SCNC: 3.5 MMOL/L (ref 3.4–5.3)
RBC # BLD AUTO: 4.73 10E6/UL (ref 4.4–5.9)
SODIUM SERPL-SCNC: 140 MMOL/L (ref 135–145)
WBC # BLD AUTO: 6.3 10E3/UL (ref 4–11)

## 2024-05-02 PROCEDURE — 99232 SBSQ HOSP IP/OBS MODERATE 35: CPT | Performed by: HOSPITALIST

## 2024-05-02 PROCEDURE — 250N000011 HC RX IP 250 OP 636: Performed by: HOSPITALIST

## 2024-05-02 PROCEDURE — 250N000013 HC RX MED GY IP 250 OP 250 PS 637: Performed by: HOSPITALIST

## 2024-05-02 PROCEDURE — 36415 COLL VENOUS BLD VENIPUNCTURE: CPT | Performed by: HOSPITALIST

## 2024-05-02 PROCEDURE — 85027 COMPLETE CBC AUTOMATED: CPT | Performed by: HOSPITALIST

## 2024-05-02 PROCEDURE — 999N000128 HC STATISTIC PERIPHERAL IV START W/O US GUIDANCE

## 2024-05-02 PROCEDURE — 80048 BASIC METABOLIC PNL TOTAL CA: CPT | Performed by: HOSPITALIST

## 2024-05-02 PROCEDURE — G0463 HOSPITAL OUTPT CLINIC VISIT: HCPCS

## 2024-05-02 PROCEDURE — 84100 ASSAY OF PHOSPHORUS: CPT | Performed by: HOSPITALIST

## 2024-05-02 PROCEDURE — 120N000001 HC R&B MED SURG/OB

## 2024-05-02 PROCEDURE — 83735 ASSAY OF MAGNESIUM: CPT | Performed by: HOSPITALIST

## 2024-05-02 RX ADMIN — Medication 15 ML: at 09:10

## 2024-05-02 RX ADMIN — Medication 20 MG: at 06:18

## 2024-05-02 RX ADMIN — CARBAMAZEPINE 150 MG: 100 SUSPENSION ORAL at 00:32

## 2024-05-02 RX ADMIN — CARBAMAZEPINE 150 MG: 100 SUSPENSION ORAL at 06:18

## 2024-05-02 RX ADMIN — BRIVARACETAM 100 MG: 10 SOLUTION ORAL at 20:52

## 2024-05-02 RX ADMIN — POTASSIUM & SODIUM PHOSPHATES POWDER PACK 280-160-250 MG 1 PACKET: 280-160-250 PACK at 00:31

## 2024-05-02 RX ADMIN — Medication 100 MCG: at 08:45

## 2024-05-02 RX ADMIN — ENOXAPARIN SODIUM 40 MG: 40 INJECTION SUBCUTANEOUS at 15:59

## 2024-05-02 RX ADMIN — OXYCODONE HYDROCHLORIDE AND ACETAMINOPHEN 1000 MG: 500 TABLET ORAL at 08:45

## 2024-05-02 RX ADMIN — CYANOCOBALAMIN TAB 1000 MCG 1000 MCG: 1000 TAB at 09:01

## 2024-05-02 RX ADMIN — LEVOTHYROXINE SODIUM 112 MCG: 112 TABLET ORAL at 06:18

## 2024-05-02 RX ADMIN — BRIVARACETAM 100 MG: 10 SOLUTION ORAL at 08:44

## 2024-05-02 RX ADMIN — HYDROCORTISONE 7.5 MG: 5 TABLET ORAL at 15:59

## 2024-05-02 RX ADMIN — CARBAMAZEPINE 100 MG: 100 SUSPENSION ORAL at 18:19

## 2024-05-02 RX ADMIN — CEFEPIME 2 G: 2 INJECTION, POWDER, FOR SOLUTION INTRAVENOUS at 04:22

## 2024-05-02 RX ADMIN — CEFEPIME 2 G: 2 INJECTION, POWDER, FOR SOLUTION INTRAVENOUS at 20:30

## 2024-05-02 RX ADMIN — POTASSIUM & SODIUM PHOSPHATES POWDER PACK 280-160-250 MG 1 PACKET: 280-160-250 PACK at 08:45

## 2024-05-02 RX ADMIN — CARBAMAZEPINE 150 MG: 100 SUSPENSION ORAL at 13:09

## 2024-05-02 RX ADMIN — CEFEPIME 2 G: 2 INJECTION, POWDER, FOR SOLUTION INTRAVENOUS at 15:08

## 2024-05-02 RX ADMIN — HYDROCORTISONE 15 MG: 10 TABLET ORAL at 08:45

## 2024-05-02 ASSESSMENT — ACTIVITIES OF DAILY LIVING (ADL)
ADLS_ACUITY_SCORE: 65
ADLS_ACUITY_SCORE: 67
ADLS_ACUITY_SCORE: 65

## 2024-05-02 NOTE — PLAN OF CARE
Summary: Sepsis - Pneumonia     DATE & TIME: 05/01/2024 - 05/02/2024 [id number]      Cognitive Concerns/ Orientation: Alert, generally cooperative  BEHAVIOR & AGGRESSION TOOL COLOR: green   ABNL VS/O2: VSS on RA, Pt has infrequent productive cough.  MOBILITY: total care, t/r q2h also moving self in bed. R sided hemiplegia  PAIN MANAGMENT: no non-verbal signs of pain.  DIET: NPO. Tube feeding at 55/hr(goal) and q2h h2o flushes  BOWEL/BLADDER: incontinent bowel & bladder- external cath in place. No BM this shift  ABNL LAB/BG: K 3.5, Ph 2.4, mg 2.1-  Redraw placed for AM  DRAIN/DEVICES: Continuous tube feeding, L PIV SL  TELEMETRY RHYTHM: n/a  SKIN: Blanchable redness to coccyx /sacrum/buttocks-- mepilex in place  TESTS/PROCEDURES: None this shift  D/C DAY/GOAL: Pending  OTHER IMPORTANT INFO:

## 2024-05-02 NOTE — PLAN OF CARE
Goal Outcome Evaluation:                      Summary:  Sepsis - Pneumonia     DATE & TIME: 05/01/2024 - 05/02/2024 9830- 3440      Cognitive Concerns/ Orientation: Alert, follows direction at times- generally cooperative; able to shake head yes/no  BEHAVIOR & AGGRESSION TOOL COLOR: green   ABNL VS/O2: VSS on RA  MOBILITY: total care, t/r q2h also moving self in bed.  R sided hemiplegia  PAIN MANAGMENT: denies pain by shaking head no. no non verbal signs of pain  DIET: NPO. Tube feeding at 55/hr(goal) and q2h h2o flushes  BOWEL/BLADDER: incontinent bowel & bladder- external cath in place  ABNL LAB/BG: K 3.5, Ph 2.4, mg 2.1-  Redraw placed for AM  DRAIN/DEVICES: Continuous tube feeding, L PIV SL  TELEMETRY RHYTHM: n/a  SKIN: Blanchable redness to coccyx /sacrum/buttocks-- mepilex in place, face peeling   TESTS/PROCEDURES: None this shift  D/C DAY/GOAL: Pending    OTHER IMPORTANT INFO: infrequent productive cough

## 2024-05-02 NOTE — PROGRESS NOTES
CLINICAL NUTRITION SERVICES - REASSESSMENT NOTE    Malnutrition: 4/28  % Weight Loss:  None noted  % Intake:  No decreased intake noted - pt receives EN  Subcutaneous Fat Loss:  chronic low stores at baseline   Muscle Loss:  chronic low muscle mass at baseline   Fluid Retention:  None noted     Malnutrition Diagnosis: Patient does not meet two of the above criteria necessary for diagnosing malnutrition     EVALUATION OF PROGRESS TOWARD GOALS   Diet: NPO  Nutrition Support:     Type of Feeding Tube: GJT (replaced 5/1)   Enteral Frequency:  Continuous  Enteral Regimen: Jevity 1.5 @ 55 mL/hr x 22 hours (held x2 hours for synthroid)  Total Enteral Provisions: 1815 kcal (26 kcal/kg), 77 g pro (1.1 g/kg), 28 g fiber, 1003 mL free water   Free Water Flush: 120 mL q 2 hours (1440 ml)  Total fluid (free water from TF + flushes) = 2443    Intake/Tolerance:   - TF held briefly for GJ tube exchange yesterday.   - Labs: (5/1. Today's labs not back)  K/Na/Mg - NL  Phos 2.4 (L) - replaced. Pt also receiving a packet of NeutraPhos daily.   Recent Labs   Lab 05/01/24  1627 05/01/24  1155 05/01/24  0735 05/01/24  0624 05/01/24  0232 04/30/24  2138   * 84 144* 145* 182* 107*     - Stooling:   Last BMs 4/29  - Weight trends:   Date/Time Weight Weight Method   04/30/24 0500 70 kg (154 lb 5.2 oz) Bed scale   04/29/24 0600 68.6 kg (151 lb 3.8 oz) Bed scale   04/28/24 0545 68.5 kg (151 lb 0.2 oz) Bed scale   04/27/24 0802 66.8 kg (147 lb 4.3 oz)      Wt Readings from Last 5 Encounters:   04/30/24 70 kg (154 lb 5.2 oz)   04/20/24 71.2 kg (156 lb 15.6 oz)   03/25/24 68.9 kg (151 lb 14.4 oz)   01/26/24 68.1 kg (150 lb 2.1 oz)   01/11/24 68.5 kg (151 lb)       - Meds:   Vitamin B12 1000 mcg  Levothyroxine  Reglan - currently held  Liquid Multivitamin   Neutraphos Pkt 1x daily   Vitamin C 1000 mg  Vitamin D3 2000 units daily     ASSESSED NUTRITION NEEDS:  Dosing Weight 68.5 kg  Estimated Energy Needs: 9145-9615 kcals (25-30  Kcal/Kg)  Justification: maintenance  Estimated Protein Needs:  grams protein (1.2-1.5 g pro/Kg)  Justification: maintenance    NEW FINDINGS:   5/1 - GJT exchange     Previous Goals:   EN to meet % estimated needs   Evaluation: Met    Previous Nutrition Diagnosis:   Inadequate protein-energy intake related to NPO status and EN has yet to resume as evidenced by pt not meeting nutrition needs   Evaluation: Completed    CURRENT NUTRITION DIAGNOSIS  No nutrition diagnosis identified at this time    INTERVENTIONS  Recommendations / Nutrition Prescription  No changes to TF regimen, flushes, or micronutrients.     Implementation  No change     Goals  TF @ goal to provide % estimated needs.     MONITORING AND EVALUATION:  Progress towards goals will be monitored and evaluated per protocol and Practice Guidelines    Marisel Fernández RD, LD  Pager: [phone]  Weekend Pager: [phone]
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
## 2024-05-02 NOTE — PLAN OF CARE
Goal Outcome Evaluation:  Summary: Sepsis - Pneumonia     DATE & TIME: 05/02/2024 8481-8074      Cognitive Concerns/ Orientation: Nonverbal, Alert, cooperative this shift.   BEHAVIOR & AGGRESSION TOOL COLOR: green   ABNL VS/O2: VSS on RA, Pt has infrequent productive cough, oral cares needed for secretions.   MOBILITY: total cares, turn & repo q2hr, R sided hemiplegia.  PAIN MANAGMENT: No non-verbal signs of pain.  DIET: NPO, no exceptions. Tube feeding at 55/hr(goal) and q2h h2o flushes  BOWEL/BLADDER: incontinent bowel & bladder- external cath in place. 2xBM's this shift.   ABNL LAB/BG: K 3.5, Ph 3.0, mg 2.1-  Recheck next AM.  DRAIN/DEVICES: Continuous tube feeding, PIV SL - intermittent abx.   SKIN: Blanchable redness to coccyx /sacrum/buttocks-- mepilex in place.  TESTS/PROCEDURES: None this shift  D/C DAY/GOAL: Friday 05/03/24  OTHER IMPORTANT INFO: Mom at bedside.
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
## 2024-05-03 LAB
BACTERIA BLD CULT: NO GROWTH
BACTERIA BLD CULT: NO GROWTH
CREAT SERPL-MCNC: 0.66 MG/DL (ref 0.67–1.17)
EGFRCR SERPLBLD CKD-EPI 2021: >90 ML/MIN/1.73M2
MAGNESIUM SERPL-MCNC: 2.1 MG/DL (ref 1.7–2.3)
PHOSPHATE SERPL-MCNC: 2.2 MG/DL (ref 2.5–4.5)
PLATELET # BLD AUTO: 171 10E3/UL (ref 150–450)
POTASSIUM SERPL-SCNC: 4.1 MMOL/L (ref 3.4–5.3)

## 2024-05-03 PROCEDURE — 250N000013 HC RX MED GY IP 250 OP 250 PS 637: Performed by: HOSPITALIST

## 2024-05-03 PROCEDURE — 85049 AUTOMATED PLATELET COUNT: CPT | Performed by: HOSPITALIST

## 2024-05-03 PROCEDURE — 82565 ASSAY OF CREATININE: CPT | Performed by: HOSPITALIST

## 2024-05-03 PROCEDURE — 120N000001 HC R&B MED SURG/OB

## 2024-05-03 PROCEDURE — 99232 SBSQ HOSP IP/OBS MODERATE 35: CPT | Performed by: HOSPITALIST

## 2024-05-03 PROCEDURE — 36415 COLL VENOUS BLD VENIPUNCTURE: CPT | Performed by: HOSPITALIST

## 2024-05-03 PROCEDURE — 250N000011 HC RX IP 250 OP 636: Mod: JZ | Performed by: HOSPITALIST

## 2024-05-03 PROCEDURE — 84100 ASSAY OF PHOSPHORUS: CPT | Performed by: HOSPITALIST

## 2024-05-03 PROCEDURE — 83735 ASSAY OF MAGNESIUM: CPT | Performed by: HOSPITALIST

## 2024-05-03 PROCEDURE — 84132 ASSAY OF SERUM POTASSIUM: CPT | Performed by: HOSPITALIST

## 2024-05-03 RX ADMIN — POTASSIUM & SODIUM PHOSPHATES POWDER PACK 280-160-250 MG 1 PACKET: 280-160-250 PACK at 14:31

## 2024-05-03 RX ADMIN — HYDROCORTISONE 15 MG: 10 TABLET ORAL at 09:09

## 2024-05-03 RX ADMIN — Medication 20 MG: at 05:46

## 2024-05-03 RX ADMIN — HYDROCORTISONE 7.5 MG: 5 TABLET ORAL at 15:48

## 2024-05-03 RX ADMIN — CEFEPIME 2 G: 2 INJECTION, POWDER, FOR SOLUTION INTRAVENOUS at 19:56

## 2024-05-03 RX ADMIN — MICONAZOLE NITRATE: 2 POWDER TOPICAL at 20:20

## 2024-05-03 RX ADMIN — POTASSIUM & SODIUM PHOSPHATES POWDER PACK 280-160-250 MG 1 PACKET: 280-160-250 PACK at 20:20

## 2024-05-03 RX ADMIN — CARBAMAZEPINE 150 MG: 100 SUSPENSION ORAL at 13:20

## 2024-05-03 RX ADMIN — CYANOCOBALAMIN TAB 1000 MCG 1000 MCG: 1000 TAB at 09:09

## 2024-05-03 RX ADMIN — CARBAMAZEPINE 150 MG: 100 SUSPENSION ORAL at 00:26

## 2024-05-03 RX ADMIN — CEFEPIME 2 G: 2 INJECTION, POWDER, FOR SOLUTION INTRAVENOUS at 12:28

## 2024-05-03 RX ADMIN — CEFEPIME 2 G: 2 INJECTION, POWDER, FOR SOLUTION INTRAVENOUS at 05:02

## 2024-05-03 RX ADMIN — LEVOTHYROXINE SODIUM 112 MCG: 112 TABLET ORAL at 05:48

## 2024-05-03 RX ADMIN — POTASSIUM & SODIUM PHOSPHATES POWDER PACK 280-160-250 MG 1 PACKET: 280-160-250 PACK at 17:00

## 2024-05-03 RX ADMIN — BRIVARACETAM 100 MG: 10 SOLUTION ORAL at 20:20

## 2024-05-03 RX ADMIN — ENOXAPARIN SODIUM 40 MG: 40 INJECTION SUBCUTANEOUS at 15:48

## 2024-05-03 RX ADMIN — OXYCODONE HYDROCHLORIDE AND ACETAMINOPHEN 1000 MG: 500 TABLET ORAL at 09:09

## 2024-05-03 RX ADMIN — BRIVARACETAM 100 MG: 10 SOLUTION ORAL at 09:09

## 2024-05-03 RX ADMIN — Medication 15 ML: at 09:09

## 2024-05-03 RX ADMIN — POTASSIUM & SODIUM PHOSPHATES POWDER PACK 280-160-250 MG 1 PACKET: 280-160-250 PACK at 09:10

## 2024-05-03 RX ADMIN — CARBAMAZEPINE 100 MG: 100 SUSPENSION ORAL at 17:00

## 2024-05-03 RX ADMIN — Medication 100 MCG: at 09:09

## 2024-05-03 RX ADMIN — CARBAMAZEPINE 150 MG: 100 SUSPENSION ORAL at 05:43

## 2024-05-03 ASSESSMENT — ACTIVITIES OF DAILY LIVING (ADL)
ADLS_ACUITY_SCORE: 65
ADLS_ACUITY_SCORE: 69
ADLS_ACUITY_SCORE: 65
ADLS_ACUITY_SCORE: 69
ADLS_ACUITY_SCORE: 65
ADLS_ACUITY_SCORE: 69
ADLS_ACUITY_SCORE: 65
ADLS_ACUITY_SCORE: 65
ADLS_ACUITY_SCORE: 69
ADLS_ACUITY_SCORE: 69

## 2024-05-03 NOTE — PLAN OF CARE
Summary: Sepsis - Pneumonia     DATE & TIME: 05/02/2024 2532-0370  Cognitive Concerns/ Orientation: Nonverbal, Alert, cooperative this shift.   BEHAVIOR & AGGRESSION TOOL COLOR: green   ABNL VS/O2: VSS on RA, Pt has infrequent productive cough, oral cares needed for secretions at times  MOBILITY: total cares, turn & repo q2hr, R sided hemiplegia.  PAIN MANAGMENT: No non-verbal signs of pain.  DIET: NPO, no exceptions. Tube feeding at 55/hr(goal) and q2h h2o flushes  BOWEL/BLADDER: incontinent bowel & bladder- external cath in place, no BM This shift  ABNL LAB/BG: K 3.5, Ph 3.0, mg 2.1-  Recheck next AM.  DRAIN/DEVICES: Continuous tube feeding, PIV R Hand SL - intermittent abx.   SKIN: Blanchable redness to coccyx /sacrum/buttocks-- mepilex in place.  TESTS/PROCEDURES: None this shift  D/C DAY/GOAL: Friday 05/03/24  OTHER IMPORTANT INFO: Mom at bedside part of evening

## 2024-05-03 NOTE — PLAN OF CARE
Goal Outcome Evaluation:       Summary: Sepsis - Pneumonia     DATE & TIME: 05/02/24-5/3/24 3403-2404  Cognitive Concerns/ Orientation: Nonverbal, Alert, cooperative this shift.   BEHAVIOR & AGGRESSION TOOL COLOR: green   ABNL VS/O2: VSS on RA, Pt has infrequent productive cough, oral cares needed for secretions at times, suctioned 1x  MOBILITY: total cares, turn & repo q2hr, R sided hemiplegia.  PAIN MANAGMENT: No non-verbal signs of pain.  DIET: NPO, no exceptions. Tube feeding at 55/hr(goal) and q2h h2o flushes held since 0500 for meds  BOWEL/BLADDER: incontinent bowel & bladder- external cath in place, leaked and changed 1x, no BM This shift  ABNL LAB/BG: K 3.5, Ph 3.0, mg 2.1-  Recheck AM.  DRAIN/DEVICES: Continuous tube feeding, PIV R Hand SL - intermittent abx.   SKIN: Blanchable redness to coccyx /sacrum/buttocks/groin-- mepilex in place.   TESTS/PROCEDURES: None this shift  D/C DAY/GOAL: Friday 05/03/24  OTHER IMPORTANT INFO: R groin mepilex changed

## 2024-05-03 NOTE — ED NOTES
Pt has been turned and repositioned by me and TREVON Giordano every 2 hours if not more thus far.  He has a new pulse ox, new male purwik, and new sheets and gown for the day .     Pt monitor desats once in  awhile but it appears to be a poor read. He remains on room air in the 90's.    show
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
## 2024-05-03 NOTE — PLAN OF CARE
"DATE & TIME: 5-3-2024 AM shift          Cognitive Concerns/ Orientation : Alert to self, appears more lethargic today. Sleeps between cares. Speech is incoherent but makes some sounds. Follows direction at times. Pt will nod yes/no &give thumbs up sign for \"good, yes, ok, happy\"  BEHAVIOR & AGGRESSION TOOL COLOR: green              ABNL VS/O2: VSS on RA  MOBILITY: total care, t/r q2h also moving self in bed.  R sided hemiplegia  PAIN MANAGMENT: denies pain by shaking head no. Also, no non verbal signs of pain  DIET: NPO. Tube feeding at 55/hr(goal) and free water flushes at 120 ml/q2h.   BOWEL/BLADDER: incontinent- external cath, adequate urine outputs. No BM  ABNL LAB/BG: phosphorus 2.2, replacing.  DRAIN/DEVICES: JG tube, redness around site, washed with soap and water, dressing changed. PIV x1 SL  TELEMETRY RHYTHM: n/a  SKIN: Blanchable redness to coccyx /sacrum/buttocks-- mepilex in place changed. Rash to groin area, cleaned with soap and water/powder applied. Miconazole powder ordered. Old internal jugular site on R neck, open to air-bruised. Fem line site R groin, scab and bruising, open to air.    TESTS/PROCEDURES: n/a  D/C DAY/GOAL:pending, on Iv abx  OTHER IMPORTANT INFO: frequent oral cares completed this shift.   " Child has been scheduled.

## 2024-05-04 PROBLEM — F34.1 PERSISTENT DEPRESSIVE DISORDER: Status: ACTIVE | Noted: 2024-05-04

## 2024-05-04 LAB
MAGNESIUM SERPL-MCNC: 2.1 MG/DL (ref 1.7–2.3)
PHOSPHATE SERPL-MCNC: 2 MG/DL (ref 2.5–4.5)
POTASSIUM SERPL-SCNC: 4 MMOL/L (ref 3.4–5.3)

## 2024-05-04 PROCEDURE — 250N000013 HC RX MED GY IP 250 OP 250 PS 637: Performed by: HOSPITALIST

## 2024-05-04 PROCEDURE — 120N000001 HC R&B MED SURG/OB

## 2024-05-04 PROCEDURE — 99232 SBSQ HOSP IP/OBS MODERATE 35: CPT | Performed by: HOSPITALIST

## 2024-05-04 PROCEDURE — 83735 ASSAY OF MAGNESIUM: CPT | Performed by: HOSPITALIST

## 2024-05-04 PROCEDURE — 84132 ASSAY OF SERUM POTASSIUM: CPT | Performed by: HOSPITALIST

## 2024-05-04 PROCEDURE — 250N000011 HC RX IP 250 OP 636: Performed by: HOSPITALIST

## 2024-05-04 PROCEDURE — 36415 COLL VENOUS BLD VENIPUNCTURE: CPT | Performed by: HOSPITALIST

## 2024-05-04 PROCEDURE — 84100 ASSAY OF PHOSPHORUS: CPT | Performed by: HOSPITALIST

## 2024-05-04 PROCEDURE — 250N000009 HC RX 250: Performed by: HOSPITALIST

## 2024-05-04 RX ORDER — CITALOPRAM HYDROBROMIDE 10 MG/1
10 TABLET ORAL DAILY
Status: DISCONTINUED | OUTPATIENT
Start: 2024-05-04 | End: 2024-05-05 | Stop reason: HOSPADM

## 2024-05-04 RX ORDER — CITALOPRAM HYDROBROMIDE 10 MG/1
10 TABLET ORAL DAILY
Qty: 30 TABLET | Refills: 0 | Status: SHIPPED | OUTPATIENT
Start: 2024-05-04 | End: 2024-06-30

## 2024-05-04 RX ADMIN — CARBAMAZEPINE 150 MG: 100 SUSPENSION ORAL at 06:00

## 2024-05-04 RX ADMIN — CARBAMAZEPINE 150 MG: 100 SUSPENSION ORAL at 13:54

## 2024-05-04 RX ADMIN — BRIVARACETAM 100 MG: 10 SOLUTION ORAL at 09:39

## 2024-05-04 RX ADMIN — MICONAZOLE NITRATE: 2 POWDER TOPICAL at 21:52

## 2024-05-04 RX ADMIN — Medication 100 MCG: at 09:37

## 2024-05-04 RX ADMIN — HYDROCORTISONE 15 MG: 10 TABLET ORAL at 09:37

## 2024-05-04 RX ADMIN — LEVOTHYROXINE SODIUM 112 MCG: 112 TABLET ORAL at 06:00

## 2024-05-04 RX ADMIN — OXYCODONE HYDROCHLORIDE AND ACETAMINOPHEN 1000 MG: 500 TABLET ORAL at 09:37

## 2024-05-04 RX ADMIN — ENOXAPARIN SODIUM 40 MG: 40 INJECTION SUBCUTANEOUS at 16:46

## 2024-05-04 RX ADMIN — CARBAMAZEPINE 150 MG: 100 SUSPENSION ORAL at 00:48

## 2024-05-04 RX ADMIN — POTASSIUM & SODIUM PHOSPHATES POWDER PACK 280-160-250 MG 1 PACKET: 280-160-250 PACK at 19:08

## 2024-05-04 RX ADMIN — HYDROCORTISONE 7.5 MG: 5 TABLET ORAL at 16:46

## 2024-05-04 RX ADMIN — POTASSIUM & SODIUM PHOSPHATES POWDER PACK 280-160-250 MG 1 PACKET: 280-160-250 PACK at 13:54

## 2024-05-04 RX ADMIN — BRIVARACETAM 100 MG: 10 SOLUTION ORAL at 21:52

## 2024-05-04 RX ADMIN — MICONAZOLE NITRATE: 2 POWDER TOPICAL at 09:39

## 2024-05-04 RX ADMIN — CARBAMAZEPINE 100 MG: 100 SUSPENSION ORAL at 19:09

## 2024-05-04 RX ADMIN — Medication 15 ML: at 09:38

## 2024-05-04 RX ADMIN — Medication 20 MG: at 06:00

## 2024-05-04 RX ADMIN — POTASSIUM & SODIUM PHOSPHATES POWDER PACK 280-160-250 MG 1 PACKET: 280-160-250 PACK at 09:37

## 2024-05-04 RX ADMIN — POTASSIUM & SODIUM PHOSPHATES POWDER PACK 280-160-250 MG 1 PACKET: 280-160-250 PACK at 10:08

## 2024-05-04 RX ADMIN — CITALOPRAM HYDROBROMIDE 10 MG: 10 TABLET ORAL at 13:54

## 2024-05-04 RX ADMIN — CYANOCOBALAMIN TAB 1000 MCG 1000 MCG: 1000 TAB at 09:37

## 2024-05-04 ASSESSMENT — ACTIVITIES OF DAILY LIVING (ADL)
ADLS_ACUITY_SCORE: 65
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
## 2024-05-04 NOTE — PLAN OF CARE
Summary: Sepsis - Pneumonia     DATE & TIME: 05/02/24-5/3/24 6424-3057  Cognitive Concerns/ Orientation: Nonverbal, Alert   BEHAVIOR & AGGRESSION TOOL COLOR: green   ABNL VS/O2: VSS on RA, Pt has infrequent productive cough, oral cares needed for secretions at times.  MOBILITY: total cares, turn & repo q2hr, R sided hemiplegia.  PAIN MANAGMENT: No non-verbal signs of pain.  DIET: NPO, no exceptions. Tube feeding at 55/hr(goal)   BOWEL/BLADDER: incontinent bowel & bladder- external cath removed due to more redness in groin area.   ABNL LAB/BG: Creatinine 0.66,  On K+ & Phos protocols-recheck in AM  DRAIN/DEVICES: Continuous tube feeding, PIV R Hand SL  SKIN: Blanchable redness to coccyx /sacrum/buttocks/groin-- mepilex in place.   TESTS/PROCEDURES: None this shift  D/C DAY/GOAL: Possibly 5/4  OTHER IMPORTANT INFO: Frequent oral cares completed this shift.

## 2024-05-04 NOTE — PLAN OF CARE
Goal Outcome Evaluation:    Summary: Sepsis - Pneumonia     DATE & TIME: 5/4/24 9994-0044  Cognitive Concerns/ Orientation: Nonverbal, Alert   BEHAVIOR & AGGRESSION TOOL COLOR: Green   ABNL VS/O2: VSS on RA, except hector   MOBILITY: Total. Turn and Repo. R sided hemiplegia.  PAIN MANAGMENT: No non-verbal signs of pain.  DIET: NPO, no exceptions.   BOWEL/BLADDER: Incontinent B&B - external cath removed due to more redness in groin area. No BM this shift  ABNL LAB/BG: Cr 0.66; Phos 2.0 (replaced per protocol)  DRAIN/DEVICES: Continuous TF through PEG @ 55 mL/hr (goal rate) with q2 120 mL flushes; R PIV SL  SKIN: Blanchable redness to coccyx/sacrum- mepilex in place. Reddened groin, powder applied  TESTS/PROCEDURES: None this shift  D/C DAY/GOAL: Started on daily Celexa, will discharge tomorrow  OTHER IMPORTANT INFO: Pt has infrequent productive cough, oral cares needed for secretions at times.

## 2024-05-04 NOTE — PLAN OF CARE
DATE & TIME: 5/3/24 3pm-11pm    Cognitive Concerns/ Orientation : HECTOR, nonverbal lethargic   BEHAVIOR & AGGRESSION TOOL COLOR: green  ABNL VS/O2: VSS, Room air  MOBILITY: total care Ax2/Lift  PAIN MANAGMENT: no signs of pain  DIET: NPO, Tube feed going at 55mL/hr  BOWEL/BLADDER: incontinent, external cath  ABNL LAB/BG: phos 2.2, replaced recheck in AM  DRAIN/DEVICES: R PIV  SKIN: reddened coccyx  D/C DATE: pending

## 2024-05-04 NOTE — PROGRESS NOTES
Care Management Follow Up    Length of Stay (days): 7    Expected Discharge Date: 05/05/2024     Concerns to be Addressed: discharge planning  *please notify pt mother 24 hrs prior to anticipated discharge*  Patient plan of care discussed at interdisciplinary rounds: Yes    Anticipated Discharge Disposition: Home, Home Care (Home Health Care [facility] has accepted for home care at discharge)     Anticipated Discharge Services: PCA (12 1/2 hours of PCA services shared by pt mom/guardian Savannah and a PCA named Keyon)  Anticipated Discharge DME: None (continue pre-existing DME)    Patient/family educated on Medicare website which has current facility and service quality ratings: yes  Education Provided on the Discharge Plan: Yes  Patient/Family in Agreement with the Plan: yes    Referrals Placed by CM/SW: Homecare  Private pay costs discussed: Not applicable    Additional Information:  Home Health agency let writer know that the orders placed did not include orders for home health. Writer paged provider to ask for home care orders to be placed. Provider will add tomorrow at discharge.     BAYRON Kwan

## 2024-05-05 VITALS
BODY MASS INDEX: 23.76 KG/M2 | HEART RATE: 69 BPM | DIASTOLIC BLOOD PRESSURE: 54 MMHG | OXYGEN SATURATION: 98 % | SYSTOLIC BLOOD PRESSURE: 117 MMHG | WEIGHT: 165.57 LBS | RESPIRATION RATE: 16 BRPM | TEMPERATURE: 97.5 F

## 2024-05-05 LAB
MAGNESIUM SERPL-MCNC: 2.1 MG/DL (ref 1.7–2.3)
PHOSPHATE SERPL-MCNC: 2 MG/DL (ref 2.5–4.5)
POTASSIUM SERPL-SCNC: 4.5 MMOL/L (ref 3.4–5.3)

## 2024-05-05 PROCEDURE — 250N000013 HC RX MED GY IP 250 OP 250 PS 637: Performed by: HOSPITALIST

## 2024-05-05 PROCEDURE — 99239 HOSP IP/OBS DSCHRG MGMT >30: CPT | Performed by: HOSPITALIST

## 2024-05-05 PROCEDURE — 84100 ASSAY OF PHOSPHORUS: CPT | Performed by: HOSPITALIST

## 2024-05-05 PROCEDURE — 83735 ASSAY OF MAGNESIUM: CPT | Performed by: HOSPITALIST

## 2024-05-05 PROCEDURE — 250N000009 HC RX 250: Performed by: HOSPITALIST

## 2024-05-05 PROCEDURE — 36415 COLL VENOUS BLD VENIPUNCTURE: CPT | Performed by: HOSPITALIST

## 2024-05-05 PROCEDURE — 84132 ASSAY OF SERUM POTASSIUM: CPT | Performed by: HOSPITALIST

## 2024-05-05 RX ADMIN — CYANOCOBALAMIN TAB 1000 MCG 1000 MCG: 1000 TAB at 08:58

## 2024-05-05 RX ADMIN — MICONAZOLE NITRATE: 2 POWDER TOPICAL at 09:00

## 2024-05-05 RX ADMIN — CARBAMAZEPINE 150 MG: 100 SUSPENSION ORAL at 06:18

## 2024-05-05 RX ADMIN — CITALOPRAM HYDROBROMIDE 10 MG: 10 TABLET ORAL at 08:59

## 2024-05-05 RX ADMIN — Medication 15 ML: at 08:58

## 2024-05-05 RX ADMIN — Medication 20 MG: at 06:18

## 2024-05-05 RX ADMIN — OXYCODONE HYDROCHLORIDE AND ACETAMINOPHEN 1000 MG: 500 TABLET ORAL at 08:59

## 2024-05-05 RX ADMIN — HYDROCORTISONE 15 MG: 10 TABLET ORAL at 08:58

## 2024-05-05 RX ADMIN — LEVOTHYROXINE SODIUM 112 MCG: 112 TABLET ORAL at 06:18

## 2024-05-05 RX ADMIN — CARBAMAZEPINE 150 MG: 100 SUSPENSION ORAL at 00:36

## 2024-05-05 RX ADMIN — Medication 100 MCG: at 08:58

## 2024-05-05 RX ADMIN — CARBAMAZEPINE 150 MG: 100 SUSPENSION ORAL at 11:08

## 2024-05-05 RX ADMIN — POTASSIUM & SODIUM PHOSPHATES POWDER PACK 280-160-250 MG 1 PACKET: 280-160-250 PACK at 08:59

## 2024-05-05 RX ADMIN — BRIVARACETAM 100 MG: 10 SOLUTION ORAL at 10:11

## 2024-05-05 ASSESSMENT — ACTIVITIES OF DAILY LIVING (ADL)
ADLS_ACUITY_SCORE: 65
ADLS_ACUITY_SCORE: 61
ADLS_ACUITY_SCORE: 65
ADLS_ACUITY_SCORE: 61
ADLS_ACUITY_SCORE: 65
ADLS_ACUITY_SCORE: 61
ADLS_ACUITY_SCORE: 65

## 2024-05-05 NOTE — PROGRESS NOTES
Care Management Discharge Note    Discharge Date: 05/05/2024       Discharge Disposition: Home, Home Care ([facility] has accepted for home care at discharge)    Discharge Services: PCA (12 1/2 hours of PCA services shared by pt mom/guardian Savannah and a PCA named Keyon)    Discharge DME: None (continue pre-existing DME)    Discharge Transportation: agency, family or friend will provide    Private pay costs discussed: Not applicable    Does the patient's insurance plan have a 3 day qualifying hospital stay waiver?  No    PAS Confirmation Code: na  Patient/family educated on Medicare website which has current facility and service quality ratings: yes    Education Provided on the Discharge Plan: Yes  Persons Notified of Discharge Plans: Home Care agency - guardians, nursing, huc   Patient/Family in Agreement with the Plan: yes    Handoff Referral Completed: No    Additional Information:  Writer sent home care referral orders to accepting [facility] [employer] [facility] [employer] [facility] for discharge to home.     BAYRON Kwan

## 2024-05-05 NOTE — PROGRESS NOTES
"Discharge    Patient discharged to home via stretcher ride with [facility] transportation  Care plan note: Unable to assess orientation due to not able to respond to orientation questions. VSS on RA. No non-verbal signs of pain. On continuous TF through PEG @ 55 mL/hr (goal rate) with q2 120 mL flushes. NPO with no exceptions. Reddened memo area, cleansed and miconazole powder applied with Q2 turn and repositions. Will return home with mom and home care. Discharge medications and paperwork given to transportation.    Listed belongings gathered and given to patient (including from security/pharmacy). Yes  Care Plan and Patient education resolved: Yes  Prescriptions if needed, hard copies sent with patient  Yes  Medication Bin checked and emptied on discharge Yes  SW/care coordinator/charge RN aware of discharge: Yes         Summary: Sepsis - Pneumonia     DATE & TIME: 5/5/24 0098-7070  Cognitive Concerns/ Orientation: Alert, follows directions at times. Able to smile when directed. Says \"hi\" to staff and responded \"home\" to MD.  BEHAVIOR & AGGRESSION TOOL COLOR: Green   ABNL VS/O2: VSS on RA, except hector   MOBILITY: Total. Turn and Repo. R sided hemiplegia.  PAIN MANAGMENT: No non-verbal signs of pain.  DIET: NPO, no exceptions.   BOWEL/BLADDER: Incontinent B&B. External catheter in place. Miconazole powder applied to reddened mmeo area.  ABNL LAB/BG: Cr 0.66  DRAIN/DEVICES: Continuous TF through PEG @ 55 mL/hr (goal rate) with q2 120 mL flushes. PIV removed for discharge  SKIN: Blanchable redness to coccyx/sacrum- mepilex in place. Reddened groin, washed and powder applied.   TESTS/PROCEDURES: None this shift  D/C DAY/GOAL:  set up ride between 3:40 and 4:20 with stretcher.  OTHER IMPORTANT INFO: Contact precautions maintained. Oral cares completed.  "

## 2024-05-05 NOTE — PLAN OF CARE
"Summary: Sepsis - Pneumonia     DATE & TIME: 5/4/24 4294-4220  Cognitive Concerns/ Orientation: Nonverbal, Lethargic  BEHAVIOR & AGGRESSION TOOL COLOR: Green   ABNL VS/O2: VSS on RA, except hector   MOBILITY: Total. Turn and Repo. R sided hemiplegia.  PAIN MANAGMENT: No non-verbal signs of pain.  DIET: NPO, no exceptions.   BOWEL/BLADDER: Incontinent B&B - external catheter placed after memo-cares and miconazole applied. Chucks in place vs brief to manage moisture. Very large loose BM this morning. New linens, gown, mepi, etc.  ABNL LAB/BG: Phos 2.0 (replaced orally per protocol), recheck in AM. K,Mg,P protocol  DRAIN/DEVICES: Continuous TF through PEG @ 55 mL/hr (goal rate) with q2 120 mL flushes; R PIV SL  SKIN: Blanchable redness to coccyx/sacrum- mepilex in place. Reddened groin, washed, and powder applied.   TESTS/PROCEDURES: None this shift  D/C DAY/GOAL: Started on daily Celexa, will most likely discharge tomorrow after mother's concerns for pt not \"being himself\" and \"sleeping off and on.\"  OTHER IMPORTANT INFO: Contact precautions maintained. Rounded on freq.     "

## 2024-05-05 NOTE — DISCHARGE SUMMARY
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  tricuspid regurgitation. There is no tricuspid stenosis.     Aortic Valve  The aortic valve is trileaflet. No aortic regurgitation is present. No aortic  stenosis is present.     Pulmonic Valve  Normal pulmonic valve. There is no pulmonic valvular regurgitation. There is  no pulmonic valvular stenosis.     Vessels  The aortic root is normal size. Normal size ascending aorta. The inferior vena  cava is normal. The pulmonary artery is normal size.     Pericardium  The pericardium appears normal. There is no pleural effusion.     Rhythm  Sinus rhythm was noted.  ______________________________________________________________________________  MMode/2D Measurements & Calculations  IVSd: 0.91 cm     LVIDd: 4.6 cm  LVIDs: 3.2 cm  LVPWd: 0.92 cm  FS: 29.5 %  LV mass(C)d: 139.1 grams  LV mass(C)dI: 75.1 grams/m2  Ao root diam: 3.7 cm  LA dimension: 3.3 cm  asc Aorta Diam: 3.6 cm  LA/Ao: 0.88  LVOT diam: 2.0 cm  LVOT area: 3.1 cm2  Ao root diam index Ht(cm/m): 2.1  Ao root diam index BSA (cm/m2): 2.0  Asc Ao diam index BSA (cm/m2): 1.9  Asc Ao diam index Ht(cm/m): 2.0  LA Volume (BP): 53.4 ml     LA Volume Index  (BP): 28.9 ml/m2  RWT: 0.40  TAPSE: 2.2 cm     Doppler Measurements & Calculations  MV E max aren: 106.0 cm/sec  MV A max aren: 41.6 cm/sec  MV E/A: 2.6  MV dec slope: 858.8 cm/sec2  MV dec time: 0.12 sec  PA acc time: 0.19 sec  TR max aren: 247.0 cm/sec  TR max P.4 mmHg  E/E' av.8  Lateral E/e': 10.7  Medial E/e': 8.9  RV S Aren: 9.4 cm/sec     ______________________________________________________________________________  Report approved by: Dr. Mark Blackwood 2024 02:19 PM           *Note: Due to a large number of results and/or encounters for the requested time period, some results have not been displayed. A complete set of results can be found in Results Review.       Discharge Medications   Current Discharge Medication List        START taking these medications    Details   citalopram (CELEXA) 10 MG tablet Take 1 tablet (10 mg) by mouth daily for 30 days  Qty: 30 tablet, Refills: 0    Associated Diagnoses: Persistent depressive disorder           CONTINUE these medications which have NOT CHANGED    Details   acetaminophen (TYLENOL) 325 MG tablet Take 650 mg by mouth every 6 hours as needed for mild pain      albuterol (PROVENTIL) (5 MG/ML) 0.5% neb solution Take 0.5 mLs (2.5 mg) by nebulization every 6 hours as needed for shortness of breath, wheezing or cough 0700 1100 1500 1900 with mucomyst  Qty: 240 mL, Refills: 3    Associated Diagnoses: Aspiration pneumonitis (H)      bacitracin 500 UNIT/GM external ointment Apply topically daily as needed for wound care To PEG site.  Qty: 30 g, Refills: 3    Associated Diagnoses: G tube feedings (H)      Brivaracetam (BRIVIACT) 10 MG/ML solution 100 mg by Oral or Feeding Tube route 2 times daily 0900, 2100      Calcium-Magnesium-Zinc 333-133-5 MG TABS per tablet Take 1 tablet by mouth daily      !! 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 carBAMazepine (TEGRETOL) 100 MG/5ML suspension 100 mg by Per Feeding Tube route daily Take at 1800      COMPOUNDED NON-CONTROLLED SUBSTANCE (CMPD RX) - PHARMACY TO MIX COMPOUNDED MEDICATION Scopolamine 0.4mg capsules - take  2 capsule by feeding tube three times daily as needed  Qty: 90 capsule, Refills: 1    Associated Diagnoses: Excessive oral secretions      Cyanocobalamin (VITAMIN B-12 PO) 1,000 mcg by Per Feeding Tube route daily      glycopyrrolate (ROBINUL) 1 MG tablet TAKE 1 TABLET(1 MG) BY MOUTH TWICE DAILY AS NEEDED FOR SECRETIONS  Qty: 180 tablet, Refills: 3    Associated Diagnoses: Excessive oral secretions      guaiFENesin (MUCINEX) 600 MG 12 hr tablet Take 1 tablet (600 mg) by mouth 2 times daily as needed for congestion  Qty: 60 tablet, Refills: 0    Associated Diagnoses: Aspiration pneumonitis (H)      hydrocortisone (CORTAID) 1 % external cream Apply topically 2 times daily as needed Apply to reddened memo areas as needed      hydrocortisone (CORTEF) 5 MG tablet Take 15 mg (3 tablets) in the morning and 7.5 mg (1.5 tablet)  at 2:00 PM. During illness patient takes more as a stress dose. Please increase the dose as directed.  Qty: 400 tablet, Refills: 3    Associated Diagnoses: Secondary adrenal insufficiency (H24)      levothyroxine (SYNTHROID/LEVOTHROID) 112 MCG tablet TAKE 1 TABLET(112 MCG) BY MOUTH DAILY  Qty: 90 tablet, Refills: 0    Associated Diagnoses: Hypothyroidism, unspecified type      metoclopramide (REGLAN) 10 MG/10ML SOLN solution Take 10 mLs (10 mg) by mouth 4 times daily (before meals and nightly) 0800, 1200, 1600, 2000 Disconnects bag before administration, then waits 45 mins before reconnecting after giving the medication  Qty: 2365 mL, Refills: 5    Associated Diagnoses: Aspiration pneumonitis (H)      miconazole (MICATIN) 2 % external powder Apply topically 2  "times daily as needed    Associated Diagnoses: Recurrent pneumonia      multivitamin, therapeutic (THERA-VIT) TABS tablet 1 tablet by Per Feeding Tube route daily      mupirocin (BACTROBAN) 2 % external ointment APPLY TOPICALLY TO THE AFFECTED AREA TWICE DAILY AS NEEDED  Qty: 30 g, Refills: 1    Associated Diagnoses: Panhypopituitarism (H24); Hypogonadism male      pantoprazole (PROTONIX) 2 mg/mL SUSP suspension TAKE 20ML PER FEEDING TUBE DAILY  Qty: 600 mL, Refills: 3    Associated Diagnoses: Gastroesophageal reflux disease      potassium & sodium phosphates (NEUTRA-PHOS) 280-160-250 MG Packet Take 1 packet by mouth daily  Qty: 100 each, Refills: 3    Associated Diagnoses: Other feeding difficulties; Malnutrition, unspecified type (H24)      testosterone cypionate (DEPOTESTOSTERONE) 200 MG/ML injection INJECT 0.25ML IN THE MUSCLE ONCE A WEEK. DISCARD REMAINDER OF VIAL  Qty: 4 mL, Refills: 5    Associated Diagnoses: Panhypopituitarism (H24); Hypogonadism male      UNABLE TO FIND Take 0.125 teaspoonful by mouth 2 times daily MEDICATION NAME: pink salt 1/8 teaspoon po bid (instead of sodium bicarb).      vitamin C (ASCORBIC ACID) 1000 MG TABS 1,000 mg by Oral or Feeding Tube route daily       vitamin D3 (CHOLECALCIFEROL) 2000 units (50 mcg) tablet 4,000 Units by Per Feeding Tube route daily      insulin syringe-needle U-100 (29G X 1/2\" 1 ML) 29G X 1/2\" 1 ML miscellaneous Syringe needle use as needed  Qty: 200 each, Refills: 11    Associated Diagnoses: Panhypopituitarism (H24)      Nutritional Supplements (BOOST HIGH PROTEIN) LIQD       sodium bicarbonate 325 MG tablet 1 tablet (325 mg) by Per J Tube route daily  Qty: 90 tablet, Refills: 3    Associated Diagnoses: Malnutrition, unspecified type (H24); Necrotizing pancreatitis       !! - Potential duplicate medications found. Please discuss with provider.        STOP taking these medications       amoxicillin-clavulanate (AUGMENTIN) 875-125 MG tablet Comments:   Reason " for Stopping:             Allergies   Allergies   Allergen Reactions    Phenytoin Sodium Other (See Comments)     Shaking violently   Tremors    Valproic Acid Other (See Comments)     Toxicity w/ bone marrow suspension, elevated ammonia levels     Scopolamine Hives     Hives with the patch - oral no problem    Vancomycin Other (See Comments)     Vancomycin flushing syndrome - pt tolerated dose at half rate.

## 2024-05-05 NOTE — PROGRESS NOTES
Care Management Follow Up    Length of Stay (days): 8    Expected Discharge Date: 05/05/2024     Concerns to be Addressed: discharge planning  *please notify pt mother 24 hrs prior to anticipated discharge*  Patient plan of care discussed at interdisciplinary rounds: Yes    Anticipated Discharge Disposition: Home, Home Care ([facility] has accepted for home care at discharge)     Anticipated Discharge Services: PCA (12 1/2 hours of PCA services shared by pt mom/guardian Savannah and a PCA named Keyon)  Anticipated Discharge DME: None (continue pre-existing DME)    Patient/family educated on Medicare website which has current facility and service quality ratings: yes  Education Provided on the Discharge Plan: Yes  Patient/Family in Agreement with the Plan: yes    Referrals Placed by CM/SW: Homecare  Private pay costs discussed: Not applicable    Additional Information:    Writer scheduled a stretcher ride using [employer] [facility] - for today between 1540 -  1620 due to TBI and MRSA precautions. PCS form   Completed and faxed and guardian updated and aware of costs.     BAYRON Kwan

## 2024-05-05 NOTE — PLAN OF CARE
"Summary: Sepsis - Pneumonia     DATE & TIME: 5/4/24 9749-4327  Cognitive Concerns/ Orientation: Nonverbal, Alert   BEHAVIOR & AGGRESSION TOOL COLOR: Green   ABNL VS/O2: VSS on RA, except hector   MOBILITY: Total. Turn and Repo. R sided hemiplegia.  PAIN MANAGMENT: No non-verbal signs of pain.  DIET: NPO, no exceptions.   BOWEL/BLADDER: Incontinent B&B - external catheter placed after memo-cares and miconazole applied. Chucks in place vs brief to manage moisture.    ABNL LAB/BG: Phos 2.0 (replaced orally per protocol), recheck in AM   DRAIN/DEVICES: Continuous TF through PEG @ 55 mL/hr (goal rate) with q2 120 mL flushes; R PIV SL  SKIN: Blanchable redness to coccyx/sacrum- mepilex in place. Reddened groin, washed, and powder applied.   TESTS/PROCEDURES: None this shift  D/C DAY/GOAL: Started on daily Celexa, will most likely discharge tomorrow after mother's concerns for pt not \"being himself\" and \"sleeping off and on.\"  OTHER IMPORTANT INFO: Contact precautions maintained. Rounded on freq. Mother at bedside. Supportive and concerned for pt.                         "

## 2024-05-06 ENCOUNTER — PATIENT OUTREACH (OUTPATIENT)
Dept: CARE COORDINATION | Facility: CLINIC | Age: 64
End: 2024-05-06
Payer: MEDICARE

## 2024-05-06 DIAGNOSIS — R13.10 DYSPHAGIA, UNSPECIFIED TYPE: Primary | ICD-10-CM

## 2024-05-06 DIAGNOSIS — Z09 HOSPITAL DISCHARGE FOLLOW-UP: ICD-10-CM

## 2024-05-06 NOTE — LETTER
[facility] CARE COORDINATION  [address]  [place] [address]    May 6, 2024    Keyon Farias  [address]  [place] [address]      Dear Keyon,    I am a clinic care coordinator who works with Elsa Queen MD with the [facility]. I wanted to introduce myself and provide you with my contact information for you to be able to call me with any questions or concerns. Below is a description of clinic care coordination and how I can further assist you.       The clinic care coordination team is made up of a registered nurse, , financial resource worker and community health worker who understand the health care system. The goal of clinic care coordination is to help you manage your health and improve access to the health care system. Our team works alongside your provider to assist you in determining your health and social needs. We can help you obtain health care and community resources, providing you with necessary information and education. We can work with you through any barriers and develop a care plan that helps coordinate and strengthen the communication between you and your care team.  Our services are voluntary and are offered without charge to you personally.    Please feel free to contact me with any questions or concerns regarding care coordination and what we can offer.      We are focused on providing you with the highest-quality healthcare experience possible.    Sincerely,     Karen Martinez, [facility]  Clinic Care Coordinator  [facility]  [phone]      Enclosed:

## 2024-05-06 NOTE — PROGRESS NOTES
Clinic Care Coordination Contact  [facility]/Voicemail    Clinical Data: Care Coordinator Outreach    Outreach Documentation Number of Outreach Attempt   4/22/2024  11:06 AM 1   4/23/2024  11:29 AM 2   5/6/2024   4:10 PM 1       Left message on patient's voicemail with call back information and requested return call.    Plan: Care Coordinator will send care coordination introduction letter with care coordinator contact information and explanation of care coordination services via mail. Care Coordinator will try to reach patient again in 1-2 business days.    Karen Martinez  [facility]  Clinic Care Coordinator  [facility]  [phone]  [email or web address]

## 2024-05-07 ENCOUNTER — MEDICAL CORRESPONDENCE (OUTPATIENT)
Dept: HEALTH INFORMATION MANAGEMENT | Facility: CLINIC | Age: 64
End: 2024-05-07

## 2024-05-07 ENCOUNTER — TELEPHONE (OUTPATIENT)
Dept: FAMILY MEDICINE | Facility: CLINIC | Age: 64
End: 2024-05-07
Payer: MEDICARE

## 2024-05-07 NOTE — TELEPHONE ENCOUNTER
Home Care is calling regarding an established patient with [facility].        9/21/2023     8:14 AM   Home Care Information   Date of Home Care episode start 9/15/2023   Current following provider Elsa Queen   Date provider agreed to follow 9/15/2023    Name/Phone Number NANCI Anderson #[phone]   Home Care agency [facility]     Requesting orders from: Elsa Queen  Provider is following patient: Yes  Is this a 60-day recertification request?  No    Orders Requested    Skilled Nursing  Request for initial certification (first set of orders)   Frequency:  2x/wk for 2 wks, 1x/wk for 7 weeks    2) Wound Care for stage 2 pressure ulcer on sacral area and right heel:     Requesting to use:  Colloblast Hydrophillic Wound Dressing: Every other day and PRN.    Physical Therapy  Request for initial evaluation and treatment (one time)     Occupational Therapy  Request for initial evaluation and treatment (one time)     Speech Therapy  Request for initial evaluation and treatment (one time)     Social Work  Request for initial evaluation and treatment (one time)       Confirmed ok to leave a detailed message with call back.  Contact information confirmed and updated as needed.    Adarsh Yanez RN

## 2024-05-07 NOTE — PROGRESS NOTES
Clinic Care Coordination Contact  [facility]/Voicemail    Clinical Data: Care Coordinator Outreach    Outreach Documentation Number of Outreach Attempt   4/22/2024  11:06 AM 1   4/23/2024  11:29 AM 2   5/6/2024   4:10 PM 1   5/7/2024   8:29 AM 2       Left message on patient's voicemail with call back information and requested return call.    Plan: Care Coordinator will send care coordination introduction letter with care coordinator contact information and explanation of care coordination services via mail. Care Coordinator will do no further outreaches at this time.    Karen Martinez,  [facility]  Clinic Care Coordinator  [facility]  [phone]  [email or web address]

## 2024-05-08 NOTE — TELEPHONE ENCOUNTER
Pt's mom called . Pt has COVID-19. Has had a fever 100.9 and 100.2 after taking tylenol. Pt is weak and has rattling noise when he breaths. Mom wants to know max tylenol dose pt can take or what she can do. Triage advised following bottle recommendations for Tylenol. Advised pt be seen at ER now. Triage tried calling 911 with mom on the phone but call was lost.       Reason for Disposition    Pale cold skin and very weak (can't stand)    Additional Information    Negative: Difficult to awaken or acting confused (e.g., disoriented, slurred speech)    Protocols used: Fever-A-OH     DC instructions

## 2024-05-09 ENCOUNTER — MEDICAL CORRESPONDENCE (OUTPATIENT)
Dept: HEALTH INFORMATION MANAGEMENT | Facility: CLINIC | Age: 64
End: 2024-05-09
Payer: MEDICARE

## 2024-05-09 ENCOUNTER — TELEPHONE (OUTPATIENT)
Dept: FAMILY MEDICINE | Facility: CLINIC | Age: 64
End: 2024-05-09
Payer: MEDICARE

## 2024-05-09 DIAGNOSIS — R13.10 ABNORMAL SWALLOWING: Primary | ICD-10-CM

## 2024-05-09 NOTE — TELEPHONE ENCOUNTER
"Mother would like to have the patient start  \" pleasure eating again .\"     He is getting supplement in feeding tubes . The pleasure eating is tiny pieces of food for him to enjoy. His mother did this before and it was successful. He takes the small amount of food with baby spoon.       Spoke to the home care  speech therapist , he would not encourage this unless the patient has a barium swallow test. Mother is willing to take him to the hospital for the  barium swallow test.     Please advise.     Yvette Barraza RN  [facility]     "

## 2024-05-10 ENCOUNTER — MEDICAL CORRESPONDENCE (OUTPATIENT)
Dept: HEALTH INFORMATION MANAGEMENT | Facility: CLINIC | Age: 64
End: 2024-05-10
Payer: MEDICARE

## 2024-05-10 ENCOUNTER — VIRTUAL VISIT (OUTPATIENT)
Dept: PHARMACY | Facility: CLINIC | Age: 64
End: 2024-05-10
Attending: INTERNAL MEDICINE
Payer: MEDICAID

## 2024-05-10 DIAGNOSIS — E23.0 PANHYPOPITUITARISM (H): ICD-10-CM

## 2024-05-10 DIAGNOSIS — R13.10 DYSPHAGIA, UNSPECIFIED TYPE: ICD-10-CM

## 2024-05-10 DIAGNOSIS — G40.909 RECURRENT SEIZURES (H): ICD-10-CM

## 2024-05-10 DIAGNOSIS — Z78.9 TAKES DIETARY SUPPLEMENTS: ICD-10-CM

## 2024-05-10 DIAGNOSIS — E03.9 HYPOTHYROIDISM, UNSPECIFIED TYPE: ICD-10-CM

## 2024-05-10 DIAGNOSIS — J96.01 ACUTE RESPIRATORY FAILURE WITH HYPOXIA (H): Primary | ICD-10-CM

## 2024-05-10 DIAGNOSIS — E87.1 HYPONATREMIA: ICD-10-CM

## 2024-05-10 DIAGNOSIS — K21.9 GASTROESOPHAGEAL REFLUX DISEASE, UNSPECIFIED WHETHER ESOPHAGITIS PRESENT: ICD-10-CM

## 2024-05-10 DIAGNOSIS — Z87.820 HISTORY OF TRAUMATIC BRAIN INJURY: ICD-10-CM

## 2024-05-10 DIAGNOSIS — E29.1 HYPOGONADISM MALE: ICD-10-CM

## 2024-05-10 DIAGNOSIS — R52 INTERMITTENT PAIN: ICD-10-CM

## 2024-05-10 PROCEDURE — 99207 PR NO CHARGE LOS: CPT | Mod: 93 | Performed by: PHARMACIST

## 2024-05-10 NOTE — PROGRESS NOTES
Medication Therapy Management (MTM) Encounter    ASSESSMENT:                            Medication Adherence/Access: No issues identified    Acute Respiratory Failure/Hyponatremia/Dysphagia: Somewhat improved per patient's mother. Referral was placed today for speech therapy.     History of traumatic brain injury/Recurrent seizures: Stable per patient's mother.     GERD: Stable.      Hypothyroidism: Last TSH is below normal limits, but T4 is within normal limits.    Secondary adrenal insufficiency/Hypogonadism: Stable.     Pain: Stable.      Supplements: Stable.     PLAN:                            Continue to take your current medications as prescribed.     Follow-up: As Needed    SUBJECTIVE/OBJECTIVE:                          Keyon Farias is a 63 year old male called for a transitions of care visit. He was discharged from [facility] on 5/5/2024 for severe sepsis due to pneumonia.  Joined on call today by mother, Savannah, as patient is non-verbal.     Reason for visit: Hospital Follow-Up.    Allergies/ADRs: Reviewed in chart  Past Medical History: Reviewed in chart  Tobacco: He reports that he quit smoking about 35 years ago. His smoking use included cigarettes. He has never used smokeless tobacco.  Alcohol: none    Medication Adherence/Access: No issues reported.  Medications are administered via G-tube by mother    Acute Respiratory Failure/Hyponatremia/Dysphagia: Savannah reports that he's doing okay following discharge, no questions or major concerns for medications. A little less active today, but she has been okay getting to transfer. To early to assess citalopram per mother, but some more smiles when responding than what she has seen lately. Does wonder about nutrition as she likes the consistency of Boost/Orgain better than Jevity.  Current medications include sodium bicarbonate 325 mg daily (though sometimes just Pink Himalayan salt) and does take potassium/sodium phosphates packet daily (they  "pay out of pocket for these - price was better this time)  He does have homecare. Keyon also receives albuterol nebs a few times a day. When he's \"rattly\" - Savannah uses scopolamine compounded capsules three times daily as needed/glycopyrrolate and/or Mucinex.  No side effects reported.  Savannah reports Keyon's breathing has been stable. For G-tube care, Savannah uses bacitracin, mupirocin, or hydrocortisone as needed.  She reports this is effective.      History of traumatic brain injury/Recurrent seizures: Patient is taking carbamazepine 150 mg three times daily (0600, 1200, 2400)/carbamazepine 100 mg once daily at 1800 and brivaracetam 100 mg twice daily at 0900/2100. Working to get refill of Briviact from neurology. Denies any recent episodes.     GERD    Current medications include: Protonix (pantoprazole) 40 mg once daily and metoclopramide three-four times daily (although prescribed for four times daily). Savannah reports no current symptoms and indicates current regimen is effective.       Hypothyroidism   Levothyroxine 112 mcg daily.   Patient is having the following symptoms: none.          TSH   Date Value Ref Range Status   01/29/2024 <0.01 (L) 0.30 - 4.20 uIU/mL Final   05/09/2022 <0.01 (L) 0.40 - 4.00 mU/L Final   07/05/2018 <0.01 (L) 0.40 - 4.00 mU/L Final     T4 Free   Date Value Ref Range Status   04/22/2021 1.40 0.76 - 1.46 ng/dL Final     Free T4   Date Value Ref Range Status   01/11/2024 1.25 0.90 - 1.70 ng/dL Final       Secondary adrenal insufficiency/Hypogonadism: Patient is receiving hydrocortisone 15 mg every morning/7.5 mg every afternoon and testosterone cypionate 60 mg every 7 days. Follows with endocrinology. Denies any known issues.      Pain:  Patient has acetaminophen available for use, but is not currently needing to use.  No side effects reported when needed.     Supplements: Patient is receive vitamin C 1,000 mg daily, vitamin D3 2,000 units daily, vitamin B12 daily, a daily " multivitamin, and calcium magnesium zinc daily. Denies any current issues.       Today's Vitals: There were no vitals taken for this visit.    Wt Readings from Last 5 Encounters:   05/05/24 165 lb 9.1 oz (75.1 kg)   04/20/24 156 lb 15.6 oz (71.2 kg)   03/25/24 151 lb 14.4 oz (68.9 kg)   01/26/24 150 lb 2.1 oz (68.1 kg)   01/11/24 151 lb (68.5 kg)     ----------------  Post Discharge Medication Reconciliation Status: discharge medications reconciled, continue medications without change.    I spent 15 minutes with this patient today. A copy of the visit note was provided to the patient's provider(s).    A summary of these recommendations was mailed to the patient.    Naveed Ortiz, PharmD, BCACP  Medication Therapy Management Pharmacist  Voicemail: [phone]    Telemedicine Visit Details  Type of service:  Telephone visit  Start Time:  12:55 Pm  End Time:  1:10 PM     Medication Therapy Recommendations  No medication therapy recommendations to display

## 2024-05-10 NOTE — TELEPHONE ENCOUNTER
Called and spoke with pt, relayed message below. Pt verbalized understanding and in agreement with plan.    JLUIS BOOKER RN on 5/10/2024 at 10:47 AM

## 2024-05-14 ENCOUNTER — MEDICAL CORRESPONDENCE (OUTPATIENT)
Dept: HEALTH INFORMATION MANAGEMENT | Facility: CLINIC | Age: 64
End: 2024-05-14
Payer: MEDICARE

## 2024-05-14 NOTE — PATIENT INSTRUCTIONS
"Recommendations from today's MTM visit:                                                    MTM (medication therapy management) is a service provided by a clinical pharmacist designed to help you get the most of out of your medicines.      Continue to take your current medications as prescribed.     Follow-up: As Needed    It was great speaking with you today.  I value your experience and would be very thankful for your time in providing feedback in our clinic survey. In the next few days, you may receive an email or text message from [employer] with a link to a survey related to your  clinical pharmacist.\"     To schedule another MTM appointment, please call the clinic directly or you may call the MTM scheduling line at [phone] or toll-free at [phone].     My Clinical Pharmacist's contact information:                                                      Please feel free to contact me with any questions or concerns you have.      Naveed Ortiz, PharmD, [facility]  Medication Therapy Management Pharmacist  Voicemail: [phone]  "

## 2024-05-15 DIAGNOSIS — Z53.9 DIAGNOSIS NOT YET DEFINED: Primary | ICD-10-CM

## 2024-05-15 PROCEDURE — G0180 MD CERTIFICATION HHA PATIENT: HCPCS | Performed by: INTERNAL MEDICINE

## 2024-05-16 ENCOUNTER — MEDICAL CORRESPONDENCE (OUTPATIENT)
Dept: HEALTH INFORMATION MANAGEMENT | Facility: CLINIC | Age: 64
End: 2024-05-16
Payer: MEDICARE

## 2024-05-17 ENCOUNTER — TRANSFERRED RECORDS (OUTPATIENT)
Dept: HEALTH INFORMATION MANAGEMENT | Facility: CLINIC | Age: 64
End: 2024-05-17
Payer: MEDICARE

## 2024-05-17 ENCOUNTER — MEDICAL CORRESPONDENCE (OUTPATIENT)
Dept: HEALTH INFORMATION MANAGEMENT | Facility: CLINIC | Age: 64
End: 2024-05-17
Payer: MEDICARE

## 2024-05-18 DIAGNOSIS — G40.803 INTRACTABLE EPILEPSY DUE TO EXTERNAL CAUSES WITH STATUS EPILEPTICUS (H): ICD-10-CM

## 2024-05-20 RX ORDER — CARBAMAZEPINE 100 MG/5ML
150 SUSPENSION ORAL 3 TIMES DAILY
Qty: 2700 ML | Refills: 0 | Status: SHIPPED | OUTPATIENT
Start: 2024-05-20 | End: 2024-07-29

## 2024-05-21 ENCOUNTER — TELEPHONE (OUTPATIENT)
Dept: FAMILY MEDICINE | Facility: CLINIC | Age: 64
End: 2024-05-21

## 2024-05-21 ENCOUNTER — DOCUMENTATION ONLY (OUTPATIENT)
Dept: CARDIOLOGY | Facility: CLINIC | Age: 64
End: 2024-05-21
Payer: MEDICARE

## 2024-05-21 NOTE — PROGRESS NOTES
MAXX RX SIGNED BY: DR JEROME   DATE: 4/5/2024  STUDY PERFORMED ON (PAP/O2/RA): RA  DOWNLOAD METHOD (BREATHE/NONIN): BREATHE  PROVIDER FAX:    DID YOU DOCUMENT CONTACT ATTEMPTS IN [facility]? YES OR NO  AFTER FIRST ATTEMPT PLEASE VERIFY PHONE NUMBER IN [employer] MATCHES PHONE NUMBER IN EPIC.    4/5/2024 MICHAEL- SPOKE WITH MOTHER SANTIAGO, GUARDIAN AND SHE REQUESTED A CALL BACK LATER.    4/9/2024 MICHAEL- SPOKE WITH MOTHER SANTIAGO AND SHE REQUESTED A CALL BACK LATER. SHE HAS AN APPT AT 1    4/19/2024 MICHAEL- SPOKE WITH MOTHER AND SHE STATE PT IS IN THE HOSPITAL CURRENTLY, I LET HER KNOW I WILL PUSH THIS OUT AND CALL BACK IN A WEEK OR TWO    4/29/2024 MICHAEL- PT STILL IN HOSPITAL    5/6/2024ACRISTIANE- SPOKE TO MOTHER, AND THEY WILL PU MAXX IN [place].  - SENT MSG TO [facility] TO CLEAR MAXX AND PLACE IN [place]    5/7/24 - BBLAURENTER - CALLED PT TO INFORM MAXX IS IN [place] READY FOR PICKUP    05/09 NAHED - PT WIFE PICKED UP AMXX.    5/13/2024 MICHAEL- DOMINIC REMINDING THAT THE MAXX NEEDS TO BE RETURNED    5/16-LP-PT WILL COMPLETE STUDY TONIGHT AND RETURN EARLY NEXT WEEK    05/20 NAHED - MAXX RETURNE IN [place] SHOWROOM. NOTIFIED [facility]OT[facility] NO FURTHER ACTION TAKEN.    5/21 ALEJANDRO- TATO RETURNED MAXX, DOWNLOAD COMPLETED AND FAXED TO PROVIDER

## 2024-05-22 ENCOUNTER — MEDICAL CORRESPONDENCE (OUTPATIENT)
Dept: HEALTH INFORMATION MANAGEMENT | Facility: CLINIC | Age: 64
End: 2024-05-22

## 2024-05-22 ENCOUNTER — TELEPHONE (OUTPATIENT)
Dept: FAMILY MEDICINE | Facility: CLINIC | Age: 64
End: 2024-05-22

## 2024-05-22 ENCOUNTER — VIRTUAL VISIT (OUTPATIENT)
Dept: FAMILY MEDICINE | Facility: CLINIC | Age: 64
End: 2024-05-22
Payer: MEDICARE

## 2024-05-22 DIAGNOSIS — T17.908A: Primary | ICD-10-CM

## 2024-05-22 DIAGNOSIS — Z53.9 DIAGNOSIS NOT YET DEFINED: Primary | ICD-10-CM

## 2024-05-22 PROCEDURE — G0180 MD CERTIFICATION HHA PATIENT: HCPCS | Performed by: INTERNAL MEDICINE

## 2024-05-22 PROCEDURE — 99441 PR PHYSICIAN TELEPHONE EVALUATION 5-10 MIN: CPT | Mod: 93 | Performed by: PHYSICIAN ASSISTANT

## 2024-05-22 PROCEDURE — 99207 E-CONSULT TO PULMONARY (ADULT OUTPT PROVIDER TO SPECIALIST WRITTEN QUESTION & RESPONSE): CPT | Mod: 93 | Performed by: PHYSICIAN ASSISTANT

## 2024-05-22 NOTE — TELEPHONE ENCOUNTER
"Patient's mother/guardian, Savannah, requesting to schedule VV to discuss DME Request for Afflovest to reduce patient hospitalizations--      \"AffloVest is a proven high frequency chest wall oscillation (HFCWO) therapy designed to provide patients the freedom and mobility to customize and enhance airway clearance therapy, help mobilize lung secretions, and promote treatment adherence for patients struggling with chronic respiratory conditions.\"  "

## 2024-05-22 NOTE — PROGRESS NOTES
Keyon is a 63 year old who is being evaluated via a billable telephone visit.    What phone number would you like to be contacted at? [phone]   How would you like to obtain your AVS? MyChart  Originating Location (pt. Location): Home    Distant Location (provider location):  On-site  Assessment and Plan:     (T11.035H) Aspiration, chronic pulmonary, initial encounter  (primary encounter diagnosis)  Comment: patient with aspiration and frequent pneumonia, patient's mom wonders if he would benefit from afflovest, he does not have a pulmonologist currently, discussed with her I've never heard of device and not sure it would be appropriate for him, she would like e consult to pulm for opinion   Plan: Adult E-Consult to Pulmonary (Outpt Provider to        Specialist Written Question & Response)    KAIDEN Myers Same Day Provider         Subjective   Keyon is a 63 year old, presenting for the following health issues:  Referral    HPI     Phone visit with patient and his mom  His mom wonders if Keyon is candidate for afflovest to prevent pneumonia  [employer]            Objective           Vitals:  No vitals were obtained today due to virtual visit.          Phone call duration: 9 minutes  Signed Electronically by: Mariaa Cui PA-C

## 2024-05-22 NOTE — TELEPHONE ENCOUNTER
Pt's mother Savannah was called with providers message.Mom states device was recommended by [employer]. Pt would need to have a F2F visit and CT scan. Mom states she doesn't know if pt has seen a pulmonologist. Triage unable to find pulmonology listed under care team.    Triage asked mom why does pt need device. Mom reports pt is frequently hospitalized for pneumonia. She wants device to prevent pneumonia. Mom denies acute breathing concerns. This morning pt has some rattling but that was cleared up after taking glycopyrrolate. Mom denies symptoms during call.       Would provider place pulmonology referral? Should pt schedule appt to discuss pulmonology referral?

## 2024-05-22 NOTE — PROGRESS NOTES
"Keyon is a 63 year old who is being evaluated via a billable video visit.    {ROOMING STAFF complete during rooming of virtual visit (Optional):214691}  {If patient encounters technical issues they should call [phone] :744386}    {PROVIDER CHARTING PREFERENCE:776913}    Subjective   Keyon is a 63 year old, presenting for the following health issues:  No chief complaint on file.  {(!) Visit Details have not yet been documented.  Please enter Visit Details and then use this list to pull in documentation. (Optional):415397}    Video Start Time: {video visit start/end time for provider to select:205274}    HPI     {SUPERLIST (Optional):348197}  {additonal problems for provider to add (Optional):986308}    {ROS Picklists (Optional):129580}      Objective           Vitals:  No vitals were obtained today due to virtual visit.    Physical Exam   {video visit exam brief selected:300514}    {Diagnostic Test Results (Optional):047237}      Video-Visit Details    Type of service:  Video Visit   Video End Time:{video visit start/end time for provider to select:673796}  Originating Location (pt. Location): {video visit patient location:494315::\"Home\"}  {PROVIDER LOCATION On-site should be selected for visits conducted from your clinic location or adjoining [facility] hospital, academic office, or other nearby [facility] building. Off-site should be selected for all other provider locations, including home:746240}  Distant Location (provider location):  {virtual location provider:625926}  Platform used for Video Visit: {Virtual Visit Platforms:635622::\"My Luv My Life My Heartbeats\"}  Signed Electronically by: Mariaa Cui PA-C  {Email feedback regarding this note to [email or web address]   :235083}  "

## 2024-05-22 NOTE — TELEPHONE ENCOUNTER
I don't know anything about this device and I am not sure it is appropriate.  I'd recommend they discuss with his pulmonologist.    thanks

## 2024-05-23 ENCOUNTER — E-CONSULT (OUTPATIENT)
Dept: PULMONOLOGY | Facility: CLINIC | Age: 64
End: 2024-05-23
Payer: MEDICARE

## 2024-05-23 PROCEDURE — 99451 NTRPROF PH1/NTRNET/EHR 5/>: CPT | Performed by: INTERNAL MEDICINE

## 2024-05-23 NOTE — PROGRESS NOTES
5/23/2024     E-Consult has been accepted.    Interprofessional consultation requested by:  Pilo Cui, Mariaa Daley PA-C      Clinical Question/Purpose: Is provider familiar device and would patient be candidate?  Most recent chest CT April of this year    Patient assessment and information reviewed: Patient is 63M with history of TBI and spastic right sided paraplegia, suffering from recurrent aspirations. Reviewed recent CT chest images and report from 4/2024 and patient does not have bronchiectasis so vest therapy is not expected to be helpful and not indicated in his situation, however I am writing some recommendations for airway clearance that might help him hopefully.     Recommendations:   - Aerobika device (DEM or patient can buy online), 10 inhalations with exhalations, expectorate, then repeat 10 inhalations/exhalations if patient is feeling secretions that he can't get up. Repeat BID or TID as needed     - Video instructions for patient and family: https://www.youtube.com/[email or web address]?v=[email or web address]    - If Aerobika is not covered, may try Acapella with same instructions    - Douneb QID PRN for cough and sputum production          The recommendations provided in this E-Consult are based on a review of clinical data pertinent to the clinical question presented, without a review of the patient's complete medical record or, the benefit of a comprehensive in-person or virtual patient evaluation. This consultation should not replace the clinical judgement and evaluation of the provider ordering this E-Consult. Any new clinical issues, or changes in patient status since the filing of this E-Consult will need to be taken into account when assessing these recommendations. Please contact me if you have further questions.    My total time spent reviewing clinical information and formulating assessment was 10 minutes.        Alissa Maurice MD

## 2024-05-24 ENCOUNTER — TELEPHONE (OUTPATIENT)
Dept: FAMILY MEDICINE | Facility: CLINIC | Age: 64
End: 2024-05-24

## 2024-05-24 NOTE — TELEPHONE ENCOUNTER
Writer contacted patient's mother, Savannah, to request that she schedule appointment to discuss the e-consult response from Pulmonology. Savannah agreed to schedule VV.

## 2024-05-24 NOTE — TELEPHONE ENCOUNTER
Home Care is calling regarding an established patient with  [facility].        9/21/2023     8:14 AM   Home Care Information   Date of Home Care episode start 9/15/2023   Current following provider Elsa Queen   Date provider agreed to follow 9/15/2023    Name/Phone Number NANCI Anderson #[phone]   Home Care agency [facility]     Requesting orders from: Elsa Queen  Provider is following patient: Yes  Is this a 60-day recertification request?  No    Orders Requested    Speech Therapy  Request for continuation of care with no increase or decrease in frequency  Frequency:  1x/wk for 6 wks for aphasia therapy. PO trials.           Order given.     Kamryn Murcia RN

## 2024-05-28 ENCOUNTER — TELEPHONE (OUTPATIENT)
Dept: ENDOCRINOLOGY | Facility: CLINIC | Age: 64
End: 2024-05-28
Payer: MEDICARE

## 2024-05-28 ENCOUNTER — TELEPHONE (OUTPATIENT)
Dept: PULMONOLOGY | Facility: CLINIC | Age: 64
End: 2024-05-28
Payer: MEDICARE

## 2024-05-28 ENCOUNTER — TELEPHONE (OUTPATIENT)
Dept: FAMILY MEDICINE | Facility: CLINIC | Age: 64
End: 2024-05-28
Payer: MEDICARE

## 2024-05-28 DIAGNOSIS — J18.9 PNEUMONIA OF BOTH LOWER LOBES DUE TO INFECTIOUS ORGANISM: Primary | ICD-10-CM

## 2024-05-28 NOTE — TELEPHONE ENCOUNTER
Please call patient's mom Savannah and see if she and Keyon can do a video visit or phone visit to go over recommendations from pulmonology.    They were a no-show last week for visit.    Thanks

## 2024-05-28 NOTE — TELEPHONE ENCOUNTER
Prior Authorization Specialty Medication Request    Medication/Dose:    Disp Refills Start End MAX   testosterone cypionate (DEPOTESTOSTERONE) 200 MG/ML injection 4 mL 5 2/22/2024 -- No   Sig: INJECT 0.25ML IN THE MUSCLE ONCE A WEEK. DISCARD REMAINDER OF VIAL     Diagnosis and ICD code (if different than what is on RX):    New/renewal/insurance change PA/secondary ins. PA:  Previously Tried and Failed:      Important Lab Values: Prior to start of medication:   Rationale: Long term stable on medication     Insurance   Primary:   Insurance ID:      Secondary (if applicable):  Insurance ID:      Pharmacy Information (if different than what is on RX)  Name:    Phone:    Fax:

## 2024-05-28 NOTE — Clinical Note
Future Appointments 5/31/2024  4:00 PM    Mariaa Hartley* CS[facility]               7/24/2024  1:30 PM    Faizan Mclean MD    [facility] 9/6/2024   1:30 PM    Mark Huntley MD [facility]

## 2024-05-28 NOTE — TELEPHONE ENCOUNTER
Spoke with Savannah, scheduled following appointment:    Appointment type: NPULM REFERRAL  Provider: EDUARDO  Return date: 09/06/2024  Specialty phone number: [phone]  Additional appointment(s) needed: FPFT, CXR  Additonal Notes: N/A

## 2024-05-28 NOTE — TELEPHONE ENCOUNTER
Patient's mother called the clinic wondering if a PA could be started for the patient's testosterone. Informed mother that she will need to call the clinic where he gets it prescribed from.    Noticed note from provider while on the phone with mother. Provider's message was relayed to her and an appointment was scheduled for an appointment. Mother agreed with this plan and had no further questions.    Liliana MADDOX RN  [facility] Triage Team

## 2024-05-29 ENCOUNTER — TELEPHONE (OUTPATIENT)
Dept: ENDOCRINOLOGY | Facility: CLINIC | Age: 64
End: 2024-05-29
Payer: MEDICARE

## 2024-05-29 DIAGNOSIS — E29.1 HYPOGONADISM MALE: ICD-10-CM

## 2024-05-29 DIAGNOSIS — E23.0 PANHYPOPITUITARISM (H): ICD-10-CM

## 2024-05-29 RX ORDER — TESTOSTERONE CYPIONATE 200 MG/ML
INJECTION, SOLUTION INTRAMUSCULAR
Qty: 4 ML | Refills: 0 | Status: SHIPPED | OUTPATIENT
Start: 2024-05-29 | End: 2024-09-26

## 2024-05-29 NOTE — TELEPHONE ENCOUNTER
Refill routed to Dr Mclean and PA sent for urgent as pt is out.  He may need to get labs done before it can be approved  Stephany Redding RN on 5/29/2024 at 3:56 PM

## 2024-05-29 NOTE — TELEPHONE ENCOUNTER
PA Initiation    Medication: TESTOSTERONE CYPIONATE 200 MG/ML IM SOLN  Insurance Company: Silver Script Part D - Phone [phone] Fax [phone]  Pharmacy Filling the Rx: [id number] - [place], [place] - [address] AT [facility] [address]  Filling Pharmacy Phone: [phone]  Filling Pharmacy Fax:    Start Date: 5/29/2024

## 2024-05-29 NOTE — TELEPHONE ENCOUNTER
Savannah calling to check on the status of the medication. Please call her when the medication has been sent to the pharmacy. Thank you.

## 2024-05-29 NOTE — TELEPHONE ENCOUNTER
Prior Authorization Specialty Medication Request    Medication/Dose: testosoerone cypionate 200 mg/ ml 0.25 ml once weekly IM   Diagnosis and ICD code (if different than what is on RX):  E29.1 hypogonadism  ew/renewal/insurance change PA/secondary ins. PA:renewal   Previously Tried and Failed:  has only been on IM injections em    Important Lab Values: levels on testosterone  [phone]   Rationale: He has been on testosterone  IM for years tolerating it and labs within normal range     Insurance   Primary: Medicare  Insurance ID:  [id number]      Pharmacy Information (if different than what is on RX)  Name:  [facility]  Phone:  [phone]  Fax:[phone]    He is  out so marking it urgent.sign

## 2024-05-30 NOTE — TELEPHONE ENCOUNTER
Prior Authorization Approval    Medication: TESTOSTERONE CYPIONATE 200 MG/ML IM SOLN  Authorization Effective Date: 5/29/2024  Authorization Expiration Date: 5/29/2025  Approved Dose/Quantity: 4/28  Reference #: [id number]   Insurance Company: Silver Script Part D - Phone [phone] Fax [phone]  Expected CoPay: $    CoPay Card Available:      Financial Assistance Needed:   Which Pharmacy is filling the prescription: [id number], [place] - [address] AT [facility] [address]  Pharmacy Notified: Yes  Patient Notified: Yes

## 2024-06-04 ENCOUNTER — VIRTUAL VISIT (OUTPATIENT)
Dept: FAMILY MEDICINE | Facility: CLINIC | Age: 64
End: 2024-06-04
Payer: MEDICARE

## 2024-06-04 ENCOUNTER — NURSE TRIAGE (OUTPATIENT)
Dept: FAMILY MEDICINE | Facility: CLINIC | Age: 64
End: 2024-06-04

## 2024-06-04 ENCOUNTER — APPOINTMENT (OUTPATIENT)
Dept: GENERAL RADIOLOGY | Facility: CLINIC | Age: 64
End: 2024-06-04
Payer: MEDICARE

## 2024-06-04 ENCOUNTER — HOSPITAL ENCOUNTER (EMERGENCY)
Facility: CLINIC | Age: 64
Discharge: HOME | End: 2024-06-04
Attending: EMERGENCY MEDICINE | Admitting: EMERGENCY MEDICINE
Payer: MEDICARE

## 2024-06-04 VITALS
BODY MASS INDEX: 23.68 KG/M2 | WEIGHT: 165 LBS | HEART RATE: 66 BPM | OXYGEN SATURATION: 94 % | DIASTOLIC BLOOD PRESSURE: 67 MMHG | SYSTOLIC BLOOD PRESSURE: 113 MMHG | RESPIRATION RATE: 16 BRPM | TEMPERATURE: 98.8 F

## 2024-06-04 DIAGNOSIS — J18.9 COMMUNITY ACQUIRED PNEUMONIA OF RIGHT LOWER LOBE OF LUNG: ICD-10-CM

## 2024-06-04 DIAGNOSIS — Z53.9 NO SHOW: Primary | ICD-10-CM

## 2024-06-04 PROCEDURE — 99207 PR NO SHOW FOR SCHEDULED APPT: CPT | Mod: 93 | Performed by: PHYSICIAN ASSISTANT

## 2024-06-04 PROCEDURE — 99283 EMERGENCY DEPT VISIT LOW MDM: CPT | Mod: 25

## 2024-06-04 PROCEDURE — 71046 X-RAY EXAM CHEST 2 VIEWS: CPT

## 2024-06-04 RX ORDER — DOXYCYCLINE 100 MG/1
100 CAPSULE ORAL 2 TIMES DAILY
Qty: 10 CAPSULE | Refills: 0 | Status: SHIPPED | OUTPATIENT
Start: 2024-06-04 | End: 2024-06-09

## 2024-06-04 ASSESSMENT — ACTIVITIES OF DAILY LIVING (ADL)
ADLS_ACUITY_SCORE: 41

## 2024-06-04 NOTE — ED TRIAGE NOTES
Pt hx TBI, bed bound, non verbal at baseline. Pt caregiver/mom heard him cough 1 time earlier today and would like an CXR to evaluate for pneumonia. Pt is afebrile, normotensive, no cough heard, LS clear to auscultation.     Triage Assessment (Adult)       Row Name 06/04/24 1537          Triage Assessment    Airway WDL WDL        Respiratory WDL    Respiratory WDL WDL        Skin Circulation/Temperature WDL    Skin Circulation/Temperature WDL WDL        Cardiac WDL    Cardiac WDL WDL        Peripheral/Neurovascular WDL    Peripheral Neurovascular WDL WDL        Cognitive/Neuro/Behavioral WDL    Cognitive/Neuro/Behavioral WDL WDL

## 2024-06-04 NOTE — ED PROVIDER NOTES
Emergency Department Note      History of Present Illness     Chief Complaint  Cough    HPI  Keyon Farias is a 63 year old male with history of traumatic brain injury, spastic hemiplegia, wheelchair-bound, nonverbal, dysphagia and past aspiration pneumonia who presents with concern for pneumonia.  Patient was reported to have a small amount of emesis yesterday, his mother/caretaker is concerned for pneumonia given previous history of aspiration pneumonia.  She did not witness this emesis, he was with his PCA at the time.  Patient's home health nurse was visiting today and believe they heard rhonchi in the right base today and recommended he be seen for chest x-ray.  Patient indicates no pain today.  Afebrile at home.    Independent Historian  Mother as detailed above.    Review of External Notes    3/21/2024 hospital admission note reviewed for aspiration pneumonia  4/17/2024 hospital admission note for community-acquired pneumonia of the right lower lobe and acute metabolic encephalopathy.  Patient started on Zosyn and discharged with Augmentin.    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)      DVT of upper extremity (deep vein thrombosis) (H)      Gastro-oesophageal reflux disease      Panhypopituitarism (H24)      Pneumonia      Seizures (H)      Sepsis due to urinary tract infection (H) 01/15/2021     Septic shock (H)      Spastic hemiplegia affecting dominant side (H)      Thyroid disease      Tracheostomy care (H)      Traumatic brain injury (H) 1989     Unspecified cerebral artery occlusion with cerebral infarction 1989     UTI (urinary tract infection)      Ventricular fibrillation (H)      Ventricular tachyarrhythmia (H)        Medications  doxycycline hyclate (VIBRAMYCIN) 100 MG  "capsule  acetaminophen (TYLENOL) 325 MG tablet  albuterol (PROVENTIL) (5 MG/ML) 0.5% neb solution  bacitracin 500 UNIT/GM external ointment  Brivaracetam (BRIVIACT) 10 MG/ML solution  Calcium-Magnesium-Zinc 333-133-5 MG TABS per tablet  carBAMazepine (TEGRETOL) 100 MG/5ML suspension  carBAMazepine (TEGRETOL) 100 MG/5ML suspension  citalopram (CELEXA) 10 MG tablet  COMPOUNDED NON-CONTROLLED SUBSTANCE (CMPD RX) - PHARMACY TO MIX COMPOUNDED MEDICATION  Cyanocobalamin (VITAMIN B-12 PO)  glycopyrrolate (ROBINUL) 1 MG tablet  guaiFENesin (MUCINEX) 600 MG 12 hr tablet  hydrocortisone (CORTAID) 1 % external cream  hydrocortisone (CORTEF) 5 MG tablet  insulin syringe-needle U-100 (29G X 1/2\" 1 ML) 29G X 1/2\" 1 ML miscellaneous  levothyroxine (SYNTHROID/LEVOTHROID) 112 MCG tablet  metoclopramide (REGLAN) 10 MG/10ML SOLN solution  miconazole (MICATIN) 2 % external powder  multivitamin, therapeutic (THERA-VIT) TABS tablet  mupirocin (BACTROBAN) 2 % external ointment  Nutritional Supplements (BOOST HIGH PROTEIN) LIQD  pantoprazole (PROTONIX) 2 mg/mL SUSP suspension  potassium & sodium phosphates (NEUTRA-PHOS) 280-160-250 MG Packet  sodium bicarbonate 325 MG tablet  testosterone cypionate (DEPOTESTOSTERONE) 200 MG/ML injection  UNABLE TO FIND  vitamin C (ASCORBIC ACID) 1000 MG TABS  vitamin D3 (CHOLECALCIFEROL) 2000 units (50 mcg) tablet        Surgical History   Past Surgical History:   Procedure Laterality Date     ENDOSCOPIC ULTRASOUND UPPER GASTROINTESTINAL TRACT (GI) N/A 1/30/2017    Procedure: ENDOSCOPIC ULTRASOUND, ESOPHAGOSCOPY / UPPER GASTROINTESTINAL TRACT (GI);  Surgeon: Jus Montana MD;  Location: [facility] OR     ENDOSCOPIC ULTRASOUND, ESOPHAGOSCOPY, GASTROSCOPY, DUODENOSCOPY (EGD), NECROSECTOMY N/A 2/7/2017    Procedure: ENDOSCOPIC ULTRASOUND, ESOPHAGOSCOPY, GASTROSCOPY, DUODENOSCOPY (EGD), NECROSECTOMY;  Surgeon: Jack Marcus MD;  Location: [facility] OR     ESOPHAGOSCOPY, GASTROSCOPY, DUODENOSCOPY (EGD), " COMBINED  3/13/2014    Procedure: COMBINED ESOPHAGOSCOPY, GASTROSCOPY, DUODENOSCOPY (EGD), BIOPSY SINGLE OR MULTIPLE;  gastroscopy;  Surgeon: Digna Rhodes MD;  Location:  GI     ESOPHAGOSCOPY, GASTROSCOPY, DUODENOSCOPY (EGD), COMBINED N/A 12/6/2016    Procedure: COMBINED ESOPHAGOSCOPY, GASTROSCOPY, DUODENOSCOPY (EGD);  Surgeon: Digna Rhodes MD;  Location:  GI     ESOPHAGOSCOPY, GASTROSCOPY, DUODENOSCOPY (EGD), COMBINED N/A 2/7/2017    Procedure: COMBINED ENDOSCOPIC ULTRASOUND, ESOPHAGOSCOPY, GASTROSCOPY, DUODENOSCOPY (EGD), FINE NEEDLE ASPIRATE/BIOPSY;  Surgeon: Too Thakur MD;  [facility]     HEAD & NECK SURGERY      reconstructive facial surgery following accident in 1989     IR FOLLOW UP VISIT INPATIENT  2/20/2019     IR GASTRO JEJUNOSTOMY TUBE CHANGE  12/20/2018     IR GASTRO JEJUNOSTOMY TUBE CHANGE  2/4/2019     IR GASTRO JEJUNOSTOMY TUBE CHANGE  3/8/2019     IR GASTRO JEJUNOSTOMY TUBE CHANGE  8/7/2019     IR GASTRO JEJUNOSTOMY TUBE CHANGE  1/13/2020     IR GASTRO JEJUNOSTOMY TUBE CHANGE  1/30/2020     IR GASTRO JEJUNOSTOMY TUBE CHANGE  6/24/2020     IR GASTRO JEJUNOSTOMY TUBE CHANGE  9/17/2020     IR GASTRO JEJUNOSTOMY TUBE CHANGE  10/14/2020     IR GASTRO JEJUNOSTOMY TUBE CHANGE  2/16/2021     IR GASTRO JEJUNOSTOMY TUBE CHANGE  5/6/2021     IR GASTRO JEJUNOSTOMY TUBE CHANGE  5/25/2021     IR GASTRO JEJUNOSTOMY TUBE CHANGE  7/26/2021     IR GASTRO JEJUNOSTOMY TUBE CHANGE  9/29/2021     IR GASTRO JEJUNOSTOMY TUBE CHANGE  11/16/2021     IR GASTRO JEJUNOSTOMY TUBE CHANGE  3/18/2022     IR GASTRO JEJUNOSTOMY TUBE CHANGE  6/8/2022     IR GASTRO JEJUNOSTOMY TUBE CHANGE  7/1/2022     IR GASTRO JEJUNOSTOMY TUBE CHANGE  11/25/2022     IR GASTRO JEJUNOSTOMY TUBE CHANGE  5/1/2023     IR GASTRO JEJUNOSTOMY TUBE CHANGE  8/10/2023     IR GASTRO JEJUNOSTOMY TUBE CHANGE  9/21/2023     IR GASTRO JEJUNOSTOMY TUBE CHANGE  11/7/2023     IR GASTRO JEJUNOSTOMY TUBE CHANGE  5/1/2024      IR PICC EXCHANGE LEFT  8/15/2019     LAPAROSCOPIC APPENDECTOMY  7/30/2013    Procedure: LAPAROSCOPIC APPENDECTOMY;  LAPAROSCOPIC APPENDECTOMY;  Surgeon: Manish Pierce MD;  Location:  OR     LAPAROSCOPIC ASSISTED INSERTION TUBE GASTROTOMY N/A 9/7/2016    Procedure: LAPAROSCOPIC ASSISTED INSERTION TUBE GASTROSTOMY;  Surgeon: Manish Pierce MD;  Location:  OR     ORTHOPEDIC SURGERY      right hand repair     TRACHEOSTOMY N/A 9/3/2016    Procedure: TRACHEOSTOMY;  Surgeon: João Ortiz MD;  Location:  OR     TRACHEOSTOMY N/A 12/2/2016    Procedure: TRACHEOSTOMY;  Surgeon: João Ortiz MD;  Location:  OR     VASCULAR SURGERY       Physical Exam   Patient Vitals for the past 24 hrs:   BP Temp Temp src Pulse Resp SpO2 Weight   06/04/24 1540 -- -- -- 66 16 94 % --   06/04/24 1535 113/67 98.8  F (37.1  C) Axillary 75 16 94 % 74.8 kg (165 lb)     Physical Exam  Physical Exam:  GENERAL: Warm, dry, alert, no increased work of breathing, lying back on gurney, eyes track provider  HEENT: PERRL, clear conjunctiva, oropharynx clear  NECK: No JVD, supple without lymphadenopathy.   HEART: Regular rate and rhythm, no murmur or rubs  LUNGS: CTAB, moving air well.  No crackles or wheezes are heard.  ABD: Soft, nontender, nondistended, no guarding, with good bowel sounds heard.  BACK: No obvious deformities  EXTREMITIES: no calf tenderness or peripheral edema.  SKIN: Warm and dry without rash or lesions.  Diagnostics   Lab Results   Labs Ordered and Resulted from Time of ED Arrival to Time of ED Departure - No data to display    Imaging  XR Chest 2 Views   Final Result   IMPRESSION: Mild asymmetric elevation of the right hemidiaphragm. Mild right basilar pulmonary opacities likely reflect atelectasis. The left lung is clear. No pleural effusion. Normal heart size. Cervical spine fusion hardware.            Independent Interpretation  CXR: No pneumothorax, infiltrate, or pleural effusion.  ED Course     Medications Administered  Medications - No data to display    Procedures  Procedures     Discussion of Management  Staffed with Dr. Melendez    Social Determinants of Health adding to complexity of care  None    ED Course  ED Course as of 06/04/24 1956 Tue Jun 04, 2024   1601 I called patient's caregiver/mother, Savannah, she reports his home health nurse may have heard lung sounds concerning for pneumonia on the right base today.   1710 I reevaluated the patient, no change in clinical condition.   1711 I spoke with the patient's caregiver/mother regarding chest x-ray findings, I suggested antibiotics.  She agrees.     Medical Decision Making / Diagnosis   CMS Diagnoses: None    MIPS     None    [facility]  Keyon Farias is a 63 year old male with history of traumatic brain injury, spastic hemiplegia, wheelchair-bound and nonverbal, dysphagia and past aspiration pneumonia who presents with a cough after an episode of emesis yesterday.  Differential includes but is not limited to aspiration pneumonia and viral respiratory infection among many others.    Patient does have a fairly extensive history of aspiration pneumonia secondary to his TBI with resultant dysphagia.  Due to concern for pneumonia a chest x-ray was obtained which did find mild right basilar pulmonary opacities which are concerning for a developing pneumonia.  Given patient's history, I favor antibiotics today.  Due to concern for aspiration, I will prescribe doxycycline.  I did consider other etiologies such as viral infection however, given his history I still favor antibiotics.  I spoke with the patient's mother/caregiver regarding these findings, she agrees with antibiotics.  Return precautions were provided.  The patient was discharged in stable condition.  All nurses notes and vital signs reviewed.    Disposition  The patient was discharged.     ICD-10 Codes:    ICD-10-CM    1. Community acquired pneumonia of right lower lobe of lung  J18.9             Discharge Medications  New Prescriptions    DOXYCYCLINE HYCLATE (VIBRAMYCIN) 100 MG CAPSULE    Take 1 capsule (100 mg) by mouth 2 times daily for 5 days         KAIDEN [place], PA-C  06/04/24 1957

## 2024-06-04 NOTE — ED NOTES
Pt placed call light on asking for RN to change TV channel. RN asked pt if he needed to go to the bathroom, needed anything or was in any pain. Pt shook head no.

## 2024-06-04 NOTE — TELEPHONE ENCOUNTER
Pt had an emesis yesterday, small.     RLL wheezing, inspiratory/expiratory, sputum clear, frequent cough. Pt has cough x 1 day.     Pt was discharged from hospital 5/7 6/4/2024  2:58 PM    97.5   RR18  Pulse 82  120/72  95% O2    Pt has hemiplegia, difficulty to answer questions.     Routing to provider; please advise.     No color change.     Pt's mom is going to call ems to ED.

## 2024-06-04 NOTE — ED NOTES
Writer called guardian to update on the arrival of pt. Pt will be transferred back home via stretcher using transport services. Updated with estimated time of arrival and discharge instructions.

## 2024-06-04 NOTE — ED PROVIDER NOTES
ED ATTENDING PHYSICIAN NOTE:   I evaluated this patient in conjunction with Keyon Power PA-C  I have participated in the care of the patient and personally performed key elements of the history, exam, and medical decision making.      HPI:   Keyon Farias is a 63 year old male with history of traumatic brain injury, spastic hemiplegia, wheelchair-bound and nonverbal, dysphagia and past aspiration pneumonia who presents for cough. Mother reports hearing him cough once today and his home health nurse thought they might have rhonchi in his right lung base. Patient had an episode of emesis yesterday. He is afebrile. Patient indicates no pain today.    Independent Historian:   Mother as detailed above.    Review of External Notes: Nurse triage note from today was reviewed     EXAM:   /67   Pulse 66   Temp 98.8  F (37.1  C) (Axillary)   Resp 16   Wt 74.8 kg (165 lb)   SpO2 94%   BMI 23.68 kg/m     General: Alert, interactive   Head:  Scalp is atraumatic    CV:  Regular rate and rhythm    No murmur   Resp:  Breath sounds are coarse bilaterally    Non-labored, no retractions or accessory muscle use    XR Chest 2 Views   Final Result   IMPRESSION: Mild asymmetric elevation of the right hemidiaphragm. Mild right basilar pulmonary opacities likely reflect atelectasis. The left lung is clear. No pleural effusion. Normal heart size. Cervical spine fusion hardware.         Independent Interpretation (X-rays, CTs, rhythm strip):  Reviewed chest x-ray: notable for right hemielevation. No focal infiltrate.    Consultations/Discussion of Management or Tests:  None     Social Determinants of Health affecting care:   None     MEDICAL DECISION MAKING/ASSESSMENT AND PLAN:   Follow presentation history and physical examination are performed, patient vomited once yesterday and had a single episode of coughing earlier today.  Given the patient's past history of significant infections mother was concerned and sought care.   Imaging demonstrates possible patchy infiltrate at the right lower lobe, may represent atelectasis however given the patient's history we will empirically treat with antibiotics.  Patient is resting comfortably has normal vital signs with no indication for further laboratory workup or hospitalization.  ALBERT Power discussed case with the patient's guardian and his mother who feels comfortable with his discharge home.     DIAGNOSIS:     ICD-10-CM    1. Community acquired pneumonia of right lower lobe of lung  J18.9         DISPOSITION:   Patient was discharged to home.      Scribe Disclosure:  Janel DYKES, am serving as a scribe at 4:19 PM on 6/4/2024 to document services personally performed by Jake Melendez MD based on my observations and the provider's statements to me.   6/4/2024  [facility] EMERGENCY DEPT     Jake Melendez MD  06/04/24 7952

## 2024-06-04 NOTE — DISCHARGE INSTRUCTIONS
Thank you for coming to [facility] emergency department.  Keyon appears to have a pneumonia developing on his right lower lung, this appears to be in the very early stage.  I prescribed the antibiotic doxycycline, take 1 pill 2 times a day for the next 5 days.  Please call Keyon's PCP to let them know about the visit to the Emergency Department.  Please return to the emergency department should Keyon experience fever, shortness of breath, or for any other concerns.

## 2024-06-05 NOTE — PROGRESS NOTES
No PFT needed per Dr Huntley. Provider requests updated CT Chest be completed prior to visit on 9/6/24.    Yoel Knight RN

## 2024-06-24 ENCOUNTER — TELEPHONE (OUTPATIENT)
Dept: FAMILY MEDICINE | Facility: CLINIC | Age: 64
End: 2024-06-24
Payer: MEDICARE

## 2024-06-24 DIAGNOSIS — J69.0 ASPIRATION PNEUMONIA OF BOTH LOWER LOBES DUE TO GASTRIC SECRETIONS (H): ICD-10-CM

## 2024-06-24 DIAGNOSIS — K11.7 DISTURBANCE OF SALIVARY SECRETION: ICD-10-CM

## 2024-06-24 NOTE — TELEPHONE ENCOUNTER
H&P and signed med list faxed to [phone].. Placed in accordion folder pink pod.  Aracelis Bundy, CMA

## 2024-06-24 NOTE — TELEPHONE ENCOUNTER
Home Care is calling regarding an established patient with  [facility].        9/21/2023     8:14 AM   Home Care Information   Date of Home Care episode start 9/15/2023   Current following provider Elsa Queen   Date provider agreed to follow 9/15/2023    Name/Phone Number NANCI Anderson #[phone]   Home Care agency [facility]     Requesting orders from: Elsa Queen  Provider is following patient: Yes  Is this a 60-day recertification request?  No    Orders Requested    Skilled Nursing  Request for initial evaluation and treatment (one time)   PCA - personal care assistant    Confirmed ok to leave a detailed message with call back.  Contact information confirmed and updated as needed.    Stacey Curry RN    Home care request; please fax the following requested information to:     [facility]  Fax #  [phone]  H&P and medlist:       Routing to provider for review; if request is approved, please route to care team to fax.

## 2024-06-24 NOTE — TELEPHONE ENCOUNTER
Home care request; please fax the following requested information to:      [facility]  Fax #  [phone]  H&P and medlist:

## 2024-06-24 NOTE — TELEPHONE ENCOUNTER
Medication Question or Refill        What medication are you calling about (include dose and sig)?: schotolamine 4MG    Preferred Pharmacy:       [facility] - [place], [place] - [address] [facility] [address]ton [facility] [address]  Phone: [phone] Fax: [phone]          Controlled Substance Agreement on file:   CSA -- Patient Level:    CSA: None found at the patient level.       Who prescribed the medication?: Bret    Do you need a refill? Yes    When did you use the medication last? 6/24/24    Patient offered an appointment? No    Do you have any questions or concerns?  Yes: Mother was wondering if he can get a few months for this script so it wouldn't need to be called in every time, with holiday coming up ASAP      Okay to leave a detailed message?: Yes at Cell number on file:    Telephone Information:   Mobile [phone]

## 2024-06-24 NOTE — TELEPHONE ENCOUNTER
Encounter routed to wrong pool. Routing to [place] [facility].      Chayo Lopez  Patient  - [place]

## 2024-06-24 NOTE — TELEPHONE ENCOUNTER
Mother called the clinic back and stated that she last picked up the prescription (schotolamine 4MG) for him was 5/10/24. Mother is requesting a refill of the prescription. Is provider okay with with refilling medication?    It was in historical medication and last filled 10/31/2019.    Liliana ANGEL [facility] Triage Team

## 2024-06-24 NOTE — TELEPHONE ENCOUNTER
"Called mom regarding refill request. Writer advised refill has not been filled for two years, last ordered on 05/20/2022. Advised she may need a visit.     Mom states, \"I will turn back around and drive home to get the bottle. It' hasn't been two years.\"    Writer advised mom to call back when she was home regarding refill of scopolamine patch.  "

## 2024-06-25 RX ORDER — SCOLOPAMINE TRANSDERMAL SYSTEM 1 MG/1
1 PATCH, EXTENDED RELEASE TRANSDERMAL
Qty: 10 PATCH | Refills: 0 | Status: SHIPPED | OUTPATIENT
Start: 2024-06-25 | End: 2024-06-30

## 2024-06-28 DIAGNOSIS — R68.89 EXCESSIVE ORAL SECRETIONS: ICD-10-CM

## 2024-06-30 ENCOUNTER — APPOINTMENT (OUTPATIENT)
Dept: CT IMAGING | Facility: CLINIC | Age: 64
DRG: 871 | End: 2024-06-30
Attending: EMERGENCY MEDICINE
Payer: MEDICARE

## 2024-06-30 ENCOUNTER — HOSPITAL ENCOUNTER (INPATIENT)
Facility: CLINIC | Age: 64
LOS: 11 days | Discharge: HOME | DRG: 871 | End: 2024-07-12
Attending: EMERGENCY MEDICINE | Admitting: INTERNAL MEDICINE
Payer: MEDICARE

## 2024-06-30 DIAGNOSIS — J18.9 SEPSIS DUE TO PNEUMONIA (H): Primary | ICD-10-CM

## 2024-06-30 DIAGNOSIS — A41.9 SEPSIS DUE TO PNEUMONIA (H): Primary | ICD-10-CM

## 2024-06-30 DIAGNOSIS — A41.9 SEVERE SEPSIS (H): ICD-10-CM

## 2024-06-30 DIAGNOSIS — K86.2 PANCREATIC CYST: ICD-10-CM

## 2024-06-30 DIAGNOSIS — E87.1 HYPONATREMIA: ICD-10-CM

## 2024-06-30 DIAGNOSIS — J18.9 PNEUMONIA OF BOTH LOWER LOBES DUE TO INFECTIOUS ORGANISM: ICD-10-CM

## 2024-06-30 DIAGNOSIS — R65.20 SEVERE SEPSIS (H): ICD-10-CM

## 2024-06-30 DIAGNOSIS — J69.0 ASPIRATION PNEUMONIA OF RIGHT MIDDLE LOBE, UNSPECIFIED ASPIRATION PNEUMONIA TYPE (H): ICD-10-CM

## 2024-06-30 DIAGNOSIS — K59.00 CONSTIPATION, UNSPECIFIED CONSTIPATION TYPE: ICD-10-CM

## 2024-06-30 DIAGNOSIS — R13.10 DYSPHAGIA, UNSPECIFIED TYPE: ICD-10-CM

## 2024-06-30 DIAGNOSIS — L89.303 PRESSURE INJURY OF BUTTOCK, STAGE 3, UNSPECIFIED LATERALITY (H): ICD-10-CM

## 2024-06-30 DIAGNOSIS — A41.9 SEPSIS, DUE TO UNSPECIFIED ORGANISM, UNSPECIFIED WHETHER ACUTE ORGAN DYSFUNCTION PRESENT (H): ICD-10-CM

## 2024-06-30 LAB
ALBUMIN SERPL BCG-MCNC: 3.8 G/DL (ref 3.5–5.2)
ALBUMIN UR-MCNC: NEGATIVE MG/DL
ALP SERPL-CCNC: 100 U/L (ref 40–150)
ALT SERPL W P-5'-P-CCNC: 20 U/L (ref 0–70)
AMMONIA PLAS-SCNC: 37 UMOL/L (ref 16–60)
ANION GAP SERPL CALCULATED.3IONS-SCNC: 12 MMOL/L (ref 7–15)
APPEARANCE UR: CLEAR
AST SERPL W P-5'-P-CCNC: 23 U/L (ref 0–45)
BASE EXCESS BLDV CALC-SCNC: 5.9 MMOL/L (ref -3–3)
BASOPHILS # BLD AUTO: 0.1 10E3/UL (ref 0–0.2)
BASOPHILS NFR BLD AUTO: 1 %
BILIRUB SERPL-MCNC: 0.2 MG/DL
BILIRUB UR QL STRIP: NEGATIVE
BUN SERPL-MCNC: 18 MG/DL (ref 8–23)
CALCIUM SERPL-MCNC: 8.5 MG/DL (ref 8.8–10.2)
CHLORIDE SERPL-SCNC: 83 MMOL/L (ref 98–107)
COLOR UR AUTO: YELLOW
CREAT SERPL-MCNC: 0.91 MG/DL (ref 0.67–1.17)
DEPRECATED HCO3 PLAS-SCNC: 27 MMOL/L (ref 22–29)
EGFRCR SERPLBLD CKD-EPI 2021: >90 ML/MIN/1.73M2
EOSINOPHIL # BLD AUTO: 0.5 10E3/UL (ref 0–0.7)
EOSINOPHIL NFR BLD AUTO: 4 %
ERYTHROCYTE [DISTWIDTH] IN BLOOD BY AUTOMATED COUNT: 14.8 % (ref 10–15)
ETHANOL SERPL-MCNC: <0.01 G/DL
FLUAV RNA SPEC QL NAA+PROBE: NEGATIVE
FLUBV RNA RESP QL NAA+PROBE: NEGATIVE
GLUCOSE SERPL-MCNC: 95 MG/DL (ref 70–99)
GLUCOSE UR STRIP-MCNC: NEGATIVE MG/DL
HCO3 BLDV-SCNC: 31 MMOL/L (ref 21–28)
HCT VFR BLD AUTO: 42.1 % (ref 40–53)
HGB BLD-MCNC: 14.3 G/DL (ref 13.3–17.7)
HGB UR QL STRIP: NEGATIVE
IMM GRANULOCYTES # BLD: 0 10E3/UL
IMM GRANULOCYTES NFR BLD: 0 %
KETONES UR STRIP-MCNC: NEGATIVE MG/DL
LACTATE SERPL-SCNC: 2.3 MMOL/L (ref 0.7–2)
LACTATE SERPL-SCNC: 2.3 MMOL/L (ref 0.7–2)
LEUKOCYTE ESTERASE UR QL STRIP: NEGATIVE
LIPASE SERPL-CCNC: 107 U/L (ref 13–60)
LYMPHOCYTES # BLD AUTO: 2.2 10E3/UL (ref 0.8–5.3)
LYMPHOCYTES NFR BLD AUTO: 16 %
MCH RBC QN AUTO: 27 PG (ref 26.5–33)
MCHC RBC AUTO-ENTMCNC: 34 G/DL (ref 31.5–36.5)
MCV RBC AUTO: 79 FL (ref 78–100)
MONOCYTES # BLD AUTO: 1.1 10E3/UL (ref 0–1.3)
MONOCYTES NFR BLD AUTO: 8 %
NEUTROPHILS # BLD AUTO: 9.7 10E3/UL (ref 1.6–8.3)
NEUTROPHILS NFR BLD AUTO: 71 %
NITRATE UR QL: NEGATIVE
NRBC # BLD AUTO: 0 10E3/UL
NRBC BLD AUTO-RTO: 0 /100
O2/TOTAL GAS SETTING VFR VENT: 21 %
OXYHGB MFR BLDV: 87 % (ref 70–75)
PCO2 BLDV: 47 MM HG (ref 40–50)
PH BLDV: 7.44 [PH] (ref 7.32–7.43)
PH UR STRIP: 7.5 [PH] (ref 5–7)
PLATELET # BLD AUTO: 208 10E3/UL (ref 150–450)
PO2 BLDV: 54 MM HG (ref 25–47)
POTASSIUM SERPL-SCNC: 3.4 MMOL/L (ref 3.4–5.3)
PROCALCITONIN SERPL IA-MCNC: 0.07 NG/ML
PROT SERPL-MCNC: 7.5 G/DL (ref 6.4–8.3)
RBC # BLD AUTO: 5.3 10E6/UL (ref 4.4–5.9)
RBC URINE: <1 /HPF
RSV RNA SPEC NAA+PROBE: NEGATIVE
SAO2 % BLDV: 88.5 % (ref 70–75)
SARS-COV-2 RNA RESP QL NAA+PROBE: NEGATIVE
SODIUM SERPL-SCNC: 122 MMOL/L (ref 135–145)
SP GR UR STRIP: 1.02 (ref 1–1.03)
SQUAMOUS EPITHELIAL: <1 /HPF
UROBILINOGEN UR STRIP-MCNC: NORMAL MG/DL
WBC # BLD AUTO: 13.7 10E3/UL (ref 4–11)
WBC URINE: 1 /HPF

## 2024-06-30 PROCEDURE — 70450 CT HEAD/BRAIN W/O DYE: CPT | Mod: MA

## 2024-06-30 PROCEDURE — 250N000011 HC RX IP 250 OP 636: Performed by: EMERGENCY MEDICINE

## 2024-06-30 PROCEDURE — 87149 DNA/RNA DIRECT PROBE: CPT | Performed by: EMERGENCY MEDICINE

## 2024-06-30 PROCEDURE — 82140 ASSAY OF AMMONIA: CPT | Performed by: EMERGENCY MEDICINE

## 2024-06-30 PROCEDURE — 87086 URINE CULTURE/COLONY COUNT: CPT | Performed by: EMERGENCY MEDICINE

## 2024-06-30 PROCEDURE — 96360 HYDRATION IV INFUSION INIT: CPT | Mod: 59

## 2024-06-30 PROCEDURE — 80053 COMPREHEN METABOLIC PANEL: CPT | Performed by: EMERGENCY MEDICINE

## 2024-06-30 PROCEDURE — 71260 CT THORAX DX C+: CPT | Mod: MA

## 2024-06-30 PROCEDURE — 83605 ASSAY OF LACTIC ACID: CPT | Performed by: EMERGENCY MEDICINE

## 2024-06-30 PROCEDURE — 83690 ASSAY OF LIPASE: CPT | Performed by: EMERGENCY MEDICINE

## 2024-06-30 PROCEDURE — 85025 COMPLETE CBC W/AUTO DIFF WBC: CPT | Performed by: EMERGENCY MEDICINE

## 2024-06-30 PROCEDURE — 99285 EMERGENCY DEPT VISIT HI MDM: CPT | Mod: 25

## 2024-06-30 PROCEDURE — 258N000003 HC RX IP 258 OP 636: Performed by: EMERGENCY MEDICINE

## 2024-06-30 PROCEDURE — 84145 PROCALCITONIN (PCT): CPT | Performed by: EMERGENCY MEDICINE

## 2024-06-30 PROCEDURE — 250N000009 HC RX 250: Performed by: EMERGENCY MEDICINE

## 2024-06-30 PROCEDURE — 96361 HYDRATE IV INFUSION ADD-ON: CPT

## 2024-06-30 PROCEDURE — 87637 SARSCOV2&INF A&B&RSV AMP PRB: CPT | Performed by: EMERGENCY MEDICINE

## 2024-06-30 PROCEDURE — 82077 ASSAY SPEC XCP UR&BREATH IA: CPT | Performed by: EMERGENCY MEDICINE

## 2024-06-30 PROCEDURE — 36415 COLL VENOUS BLD VENIPUNCTURE: CPT | Performed by: EMERGENCY MEDICINE

## 2024-06-30 PROCEDURE — 81001 URINALYSIS AUTO W/SCOPE: CPT | Performed by: EMERGENCY MEDICINE

## 2024-06-30 PROCEDURE — 82805 BLOOD GASES W/O2 SATURATION: CPT | Performed by: EMERGENCY MEDICINE

## 2024-06-30 PROCEDURE — 87186 SC STD MICRODIL/AGAR DIL: CPT | Performed by: EMERGENCY MEDICINE

## 2024-06-30 PROCEDURE — 80156 ASSAY CARBAMAZEPINE TOTAL: CPT | Performed by: EMERGENCY MEDICINE

## 2024-06-30 PROCEDURE — 250N000013 HC RX MED GY IP 250 OP 250 PS 637: Performed by: EMERGENCY MEDICINE

## 2024-06-30 RX ORDER — CITALOPRAM HYDROBROMIDE 10 MG/1
10 TABLET ORAL DAILY
COMMUNITY
End: 2024-07-23

## 2024-06-30 RX ORDER — PIPERACILLIN SODIUM, TAZOBACTAM SODIUM 4; .5 G/20ML; G/20ML
4.5 INJECTION, POWDER, LYOPHILIZED, FOR SOLUTION INTRAVENOUS ONCE
Status: COMPLETED | OUTPATIENT
Start: 2024-06-30 | End: 2024-07-01

## 2024-06-30 RX ORDER — IOPAMIDOL 755 MG/ML
83 INJECTION, SOLUTION INTRAVASCULAR ONCE
Status: COMPLETED | OUTPATIENT
Start: 2024-06-30 | End: 2024-06-30

## 2024-06-30 RX ORDER — CARBAMAZEPINE 100 MG/5ML
150 SUSPENSION ORAL ONCE
Status: COMPLETED | OUTPATIENT
Start: 2024-07-01 | End: 2024-07-01

## 2024-06-30 RX ORDER — AZITHROMYCIN 500 MG/1
500 INJECTION, POWDER, LYOPHILIZED, FOR SOLUTION INTRAVENOUS ONCE
Status: DISCONTINUED | OUTPATIENT
Start: 2024-06-30 | End: 2024-07-01

## 2024-06-30 RX ADMIN — SODIUM CHLORIDE 65 ML: 9 INJECTION, SOLUTION INTRAVENOUS at 21:40

## 2024-06-30 RX ADMIN — SODIUM CHLORIDE 250 ML: 9 INJECTION, SOLUTION INTRAVENOUS at 22:18

## 2024-06-30 RX ADMIN — BRIVARACETAM 100 MG: 10 SOLUTION ORAL at 22:51

## 2024-06-30 RX ADMIN — IOPAMIDOL 83 ML: 755 INJECTION, SOLUTION INTRAVENOUS at 21:40

## 2024-06-30 ASSESSMENT — ACTIVITIES OF DAILY LIVING (ADL)
ADLS_ACUITY_SCORE: 41

## 2024-07-01 ENCOUNTER — HOSPITAL ENCOUNTER (INPATIENT)
Dept: NEUROLOGY | Facility: CLINIC | Age: 64
Discharge: HOME | DRG: 871 | End: 2024-07-01
Attending: PSYCHIATRY & NEUROLOGY
Payer: MEDICARE

## 2024-07-01 PROBLEM — K59.00 CONSTIPATION, UNSPECIFIED CONSTIPATION TYPE: Status: ACTIVE | Noted: 2024-07-01

## 2024-07-01 PROBLEM — K86.2 PANCREATIC CYST: Status: ACTIVE | Noted: 2024-07-01

## 2024-07-01 LAB
ANION GAP SERPL CALCULATED.3IONS-SCNC: 11 MMOL/L (ref 7–15)
BUN SERPL-MCNC: 13.1 MG/DL (ref 8–23)
CALCIUM SERPL-MCNC: 8 MG/DL (ref 8.8–10.2)
CARBAMAZEPINE SERPL-MCNC: 10.1 UG/ML (ref 4–12)
CARBAMAZEPINE SERPL-MCNC: 10.6 UG/ML (ref 4–12)
CHLORIDE SERPL-SCNC: 88 MMOL/L (ref 98–107)
CREAT SERPL-MCNC: 1.02 MG/DL (ref 0.67–1.17)
DEPRECATED HCO3 PLAS-SCNC: 26 MMOL/L (ref 22–29)
EGFRCR SERPLBLD CKD-EPI 2021: 84 ML/MIN/1.73M2
ERYTHROCYTE [DISTWIDTH] IN BLOOD BY AUTOMATED COUNT: 15.2 % (ref 10–15)
GLUCOSE BLDC GLUCOMTR-MCNC: 85 MG/DL (ref 70–99)
GLUCOSE SERPL-MCNC: 82 MG/DL (ref 70–99)
HCT VFR BLD AUTO: 40.5 % (ref 40–53)
HGB BLD-MCNC: 13.4 G/DL (ref 13.3–17.7)
LACTATE SERPL-SCNC: 1.5 MMOL/L (ref 0.7–2)
LACTATE SERPL-SCNC: 2.6 MMOL/L (ref 0.7–2)
MAGNESIUM SERPL-MCNC: 2.1 MG/DL (ref 1.7–2.3)
MCH RBC QN AUTO: 27 PG (ref 26.5–33)
MCHC RBC AUTO-ENTMCNC: 33.1 G/DL (ref 31.5–36.5)
MCV RBC AUTO: 82 FL (ref 78–100)
PHOSPHATE SERPL-MCNC: 2.5 MG/DL (ref 2.5–4.5)
PLATELET # BLD AUTO: 182 10E3/UL (ref 150–450)
POTASSIUM SERPL-SCNC: 4 MMOL/L (ref 3.4–5.3)
RBC # BLD AUTO: 4.96 10E6/UL (ref 4.4–5.9)
SODIUM SERPL-SCNC: 125 MMOL/L (ref 135–145)
T4 FREE SERPL-MCNC: 1.26 NG/DL (ref 0.9–1.7)
TSH SERPL DL<=0.005 MIU/L-ACNC: <0.01 UIU/ML (ref 0.3–4.2)
WBC # BLD AUTO: 8.2 10E3/UL (ref 4–11)

## 2024-07-01 PROCEDURE — 99223 1ST HOSP IP/OBS HIGH 75: CPT | Mod: AI | Performed by: INTERNAL MEDICINE

## 2024-07-01 PROCEDURE — 80156 ASSAY CARBAMAZEPINE TOTAL: CPT | Performed by: INTERNAL MEDICINE

## 2024-07-01 PROCEDURE — 99207 PR APP CREDIT; MD BILLING SHARED VISIT: CPT | Performed by: STUDENT IN AN ORGANIZED HEALTH CARE EDUCATION/TRAINING PROGRAM

## 2024-07-01 PROCEDURE — 258N000003 HC RX IP 258 OP 636: Performed by: INTERNAL MEDICINE

## 2024-07-01 PROCEDURE — 83605 ASSAY OF LACTIC ACID: CPT | Performed by: INTERNAL MEDICINE

## 2024-07-01 PROCEDURE — 250N000011 HC RX IP 250 OP 636: Performed by: EMERGENCY MEDICINE

## 2024-07-01 PROCEDURE — 250N000013 HC RX MED GY IP 250 OP 250 PS 637: Performed by: PSYCHIATRY & NEUROLOGY

## 2024-07-01 PROCEDURE — 83735 ASSAY OF MAGNESIUM: CPT | Performed by: STUDENT IN AN ORGANIZED HEALTH CARE EDUCATION/TRAINING PROGRAM

## 2024-07-01 PROCEDURE — 36415 COLL VENOUS BLD VENIPUNCTURE: CPT | Performed by: INTERNAL MEDICINE

## 2024-07-01 PROCEDURE — 84295 ASSAY OF SERUM SODIUM: CPT | Performed by: INTERNAL MEDICINE

## 2024-07-01 PROCEDURE — 95816 EEG AWAKE AND DROWSY: CPT

## 2024-07-01 PROCEDURE — 84443 ASSAY THYROID STIM HORMONE: CPT | Performed by: INTERNAL MEDICINE

## 2024-07-01 PROCEDURE — 36415 COLL VENOUS BLD VENIPUNCTURE: CPT | Performed by: STUDENT IN AN ORGANIZED HEALTH CARE EDUCATION/TRAINING PROGRAM

## 2024-07-01 PROCEDURE — 250N000013 HC RX MED GY IP 250 OP 250 PS 637: Performed by: INTERNAL MEDICINE

## 2024-07-01 PROCEDURE — 120N000001 HC R&B MED SURG/OB

## 2024-07-01 PROCEDURE — 84439 ASSAY OF FREE THYROXINE: CPT | Performed by: INTERNAL MEDICINE

## 2024-07-01 PROCEDURE — 83605 ASSAY OF LACTIC ACID: CPT | Performed by: STUDENT IN AN ORGANIZED HEALTH CARE EDUCATION/TRAINING PROGRAM

## 2024-07-01 PROCEDURE — 84100 ASSAY OF PHOSPHORUS: CPT | Performed by: STUDENT IN AN ORGANIZED HEALTH CARE EDUCATION/TRAINING PROGRAM

## 2024-07-01 PROCEDURE — 85018 HEMOGLOBIN: CPT | Performed by: INTERNAL MEDICINE

## 2024-07-01 PROCEDURE — 250N000013 HC RX MED GY IP 250 OP 250 PS 637: Performed by: EMERGENCY MEDICINE

## 2024-07-01 PROCEDURE — 250N000011 HC RX IP 250 OP 636: Performed by: INTERNAL MEDICINE

## 2024-07-01 RX ORDER — CHOLECALCIFEROL (VITAMIN D3) 50 MCG
100 TABLET ORAL DAILY
Status: DISCONTINUED | OUTPATIENT
Start: 2024-07-01 | End: 2024-07-12 | Stop reason: HOSPADM

## 2024-07-01 RX ORDER — CARBAMAZEPINE 100 MG/5ML
150 SUSPENSION ORAL 2 TIMES DAILY
Status: DISCONTINUED | OUTPATIENT
Start: 2024-07-01 | End: 2024-07-12 | Stop reason: HOSPADM

## 2024-07-01 RX ORDER — CARBAMAZEPINE 100 MG/5ML
100 SUSPENSION ORAL DAILY
Status: DISCONTINUED | OUTPATIENT
Start: 2024-07-01 | End: 2024-07-03

## 2024-07-01 RX ORDER — SODIUM CHLORIDE 9 MG/ML
INJECTION, SOLUTION INTRAVENOUS CONTINUOUS
Status: DISCONTINUED | OUTPATIENT
Start: 2024-07-01 | End: 2024-07-01

## 2024-07-01 RX ORDER — ONDANSETRON 2 MG/ML
4 INJECTION INTRAMUSCULAR; INTRAVENOUS ONCE
Status: COMPLETED | OUTPATIENT
Start: 2024-07-01 | End: 2024-07-01

## 2024-07-01 RX ORDER — LIDOCAINE 40 MG/G
CREAM TOPICAL
Status: DISCONTINUED | OUTPATIENT
Start: 2024-07-01 | End: 2024-07-12 | Stop reason: HOSPADM

## 2024-07-01 RX ORDER — CARBAMAZEPINE 100 MG/5ML
150 SUSPENSION ORAL 3 TIMES DAILY
Status: DISCONTINUED | OUTPATIENT
Start: 2024-07-01 | End: 2024-07-01

## 2024-07-01 RX ORDER — GLYCOPYRROLATE 1 MG/1
1 TABLET ORAL 2 TIMES DAILY PRN
Status: DISCONTINUED | OUTPATIENT
Start: 2024-07-01 | End: 2024-07-12 | Stop reason: HOSPADM

## 2024-07-01 RX ORDER — ONDANSETRON 4 MG/1
4 TABLET, ORALLY DISINTEGRATING ORAL EVERY 6 HOURS PRN
Status: DISCONTINUED | OUTPATIENT
Start: 2024-07-01 | End: 2024-07-12 | Stop reason: HOSPADM

## 2024-07-01 RX ORDER — LEVOTHYROXINE SODIUM 112 UG/1
112 TABLET ORAL DAILY
Status: DISCONTINUED | OUTPATIENT
Start: 2024-07-02 | End: 2024-07-12 | Stop reason: HOSPADM

## 2024-07-01 RX ORDER — LEVOTHYROXINE SODIUM 112 UG/1
112 TABLET ORAL DAILY
Status: DISCONTINUED | OUTPATIENT
Start: 2024-07-01 | End: 2024-07-01

## 2024-07-01 RX ORDER — AZITHROMYCIN 500 MG/1
500 INJECTION, POWDER, LYOPHILIZED, FOR SOLUTION INTRAVENOUS EVERY 24 HOURS
Status: DISCONTINUED | OUTPATIENT
Start: 2024-07-01 | End: 2024-07-02

## 2024-07-01 RX ORDER — ASCORBIC ACID 500 MG
3000 TABLET ORAL DAILY
Status: DISCONTINUED | OUTPATIENT
Start: 2024-07-01 | End: 2024-07-12 | Stop reason: HOSPADM

## 2024-07-01 RX ORDER — POLYETHYLENE GLYCOL 3350 17 G/17G
17 POWDER, FOR SOLUTION ORAL DAILY
Status: DISCONTINUED | OUTPATIENT
Start: 2024-07-01 | End: 2024-07-01

## 2024-07-01 RX ORDER — MULTIVITAMIN,THERAPEUTIC
1 TABLET ORAL DAILY
Status: DISCONTINUED | OUTPATIENT
Start: 2024-07-01 | End: 2024-07-01 | Stop reason: ALTCHOICE

## 2024-07-01 RX ORDER — GUAIFENESIN 600 MG/1
15 TABLET, EXTENDED RELEASE ORAL DAILY
Status: DISCONTINUED | OUTPATIENT
Start: 2024-07-02 | End: 2024-07-12 | Stop reason: HOSPADM

## 2024-07-01 RX ORDER — GUAIFENESIN 600 MG/1
600 TABLET, EXTENDED RELEASE ORAL 2 TIMES DAILY PRN
Status: DISCONTINUED | OUTPATIENT
Start: 2024-07-01 | End: 2024-07-12 | Stop reason: HOSPADM

## 2024-07-01 RX ORDER — GLYCOPYRROLATE 1 MG/1
1 TABLET ORAL 2 TIMES DAILY PRN
Status: DISCONTINUED | OUTPATIENT
Start: 2024-07-01 | End: 2024-07-01

## 2024-07-01 RX ORDER — CITALOPRAM HYDROBROMIDE 10 MG/1
10 TABLET ORAL DAILY
Status: DISCONTINUED | OUTPATIENT
Start: 2024-07-02 | End: 2024-07-12 | Stop reason: HOSPADM

## 2024-07-01 RX ORDER — SODIUM CHLORIDE 9 MG/ML
INJECTION, SOLUTION INTRAVENOUS CONTINUOUS
Status: DISCONTINUED | OUTPATIENT
Start: 2024-07-01 | End: 2024-07-03

## 2024-07-01 RX ORDER — ALBUTEROL SULFATE 0.83 MG/ML
2.5 SOLUTION RESPIRATORY (INHALATION) EVERY 6 HOURS PRN
Status: DISCONTINUED | OUTPATIENT
Start: 2024-07-01 | End: 2024-07-12 | Stop reason: HOSPADM

## 2024-07-01 RX ORDER — ACETAMINOPHEN 325 MG/1
650 TABLET ORAL EVERY 6 HOURS PRN
Status: DISCONTINUED | OUTPATIENT
Start: 2024-07-01 | End: 2024-07-01

## 2024-07-01 RX ORDER — LANOLIN ALCOHOL/MO/W.PET/CERES
1000 CREAM (GRAM) TOPICAL DAILY
Status: DISCONTINUED | OUTPATIENT
Start: 2024-07-01 | End: 2024-07-12 | Stop reason: HOSPADM

## 2024-07-01 RX ORDER — PIPERACILLIN SODIUM, TAZOBACTAM SODIUM 4; .5 G/20ML; G/20ML
4.5 INJECTION, POWDER, LYOPHILIZED, FOR SOLUTION INTRAVENOUS EVERY 6 HOURS
Status: DISCONTINUED | OUTPATIENT
Start: 2024-07-01 | End: 2024-07-02

## 2024-07-01 RX ORDER — LORAZEPAM 2 MG/ML
2 INJECTION INTRAMUSCULAR
Status: DISCONTINUED | OUTPATIENT
Start: 2024-07-01 | End: 2024-07-12 | Stop reason: HOSPADM

## 2024-07-01 RX ORDER — ONDANSETRON 2 MG/ML
4 INJECTION INTRAMUSCULAR; INTRAVENOUS EVERY 6 HOURS PRN
Status: DISCONTINUED | OUTPATIENT
Start: 2024-07-01 | End: 2024-07-12 | Stop reason: HOSPADM

## 2024-07-01 RX ORDER — SODIUM BICARBONATE 325 MG/1
325 TABLET ORAL DAILY
Status: DISCONTINUED | OUTPATIENT
Start: 2024-07-01 | End: 2024-07-12 | Stop reason: HOSPADM

## 2024-07-01 RX ORDER — ACETAMINOPHEN 325 MG/1
650 TABLET ORAL EVERY 6 HOURS PRN
Status: DISCONTINUED | OUTPATIENT
Start: 2024-07-01 | End: 2024-07-12 | Stop reason: HOSPADM

## 2024-07-01 RX ORDER — POLYETHYLENE GLYCOL 3350 17 G/17G
17 POWDER, FOR SOLUTION ORAL DAILY
Status: DISCONTINUED | OUTPATIENT
Start: 2024-07-02 | End: 2024-07-12 | Stop reason: HOSPADM

## 2024-07-01 RX ORDER — CALCIUM CARBONATE 500 MG/1
1000 TABLET, CHEWABLE ORAL 4 TIMES DAILY PRN
Status: DISCONTINUED | OUTPATIENT
Start: 2024-07-01 | End: 2024-07-01

## 2024-07-01 RX ORDER — CITALOPRAM HYDROBROMIDE 10 MG/1
10 TABLET ORAL DAILY
Status: DISCONTINUED | OUTPATIENT
Start: 2024-07-01 | End: 2024-07-01

## 2024-07-01 RX ORDER — CALCIUM CARBONATE 500 MG/1
1000 TABLET, CHEWABLE ORAL 4 TIMES DAILY PRN
Status: DISCONTINUED | OUTPATIENT
Start: 2024-07-01 | End: 2024-07-12 | Stop reason: HOSPADM

## 2024-07-01 RX ORDER — DEXTROSE MONOHYDRATE 100 MG/ML
INJECTION, SOLUTION INTRAVENOUS CONTINUOUS PRN
Status: DISCONTINUED | OUTPATIENT
Start: 2024-07-01 | End: 2024-07-12 | Stop reason: HOSPADM

## 2024-07-01 RX ADMIN — BRIVARACETAM 100 MG: 10 SOLUTION ORAL at 21:07

## 2024-07-01 RX ADMIN — PIPERACILLIN AND TAZOBACTAM 4.5 G: 4; .5 INJECTION, POWDER, FOR SOLUTION INTRAVENOUS at 18:43

## 2024-07-01 RX ADMIN — BRIVARACETAM 100 MG: 10 SOLUTION ORAL at 09:03

## 2024-07-01 RX ADMIN — CARBAMAZEPINE 150 MG: 100 SUSPENSION ORAL at 09:04

## 2024-07-01 RX ADMIN — PIPERACILLIN AND TAZOBACTAM 4.5 G: 4; .5 INJECTION, POWDER, FOR SOLUTION INTRAVENOUS at 05:56

## 2024-07-01 RX ADMIN — SODIUM CHLORIDE: 9 INJECTION, SOLUTION INTRAVENOUS at 18:44

## 2024-07-01 RX ADMIN — HYDROCORTISONE 7.5 MG: 5 TABLET ORAL at 13:19

## 2024-07-01 RX ADMIN — CITALOPRAM HYDROBROMIDE 10 MG: 10 TABLET ORAL at 09:06

## 2024-07-01 RX ADMIN — HYDROCORTISONE 15 MG: 10 TABLET ORAL at 09:06

## 2024-07-01 RX ADMIN — MULTIVITAMIN TABLET 1 TABLET: TABLET at 09:06

## 2024-07-01 RX ADMIN — CARBAMAZEPINE 150 MG: 100 SUSPENSION ORAL at 00:06

## 2024-07-01 RX ADMIN — SODIUM BICARBONATE 325 MG: 325 TABLET ORAL at 09:05

## 2024-07-01 RX ADMIN — SODIUM CHLORIDE: 9 INJECTION, SOLUTION INTRAVENOUS at 04:23

## 2024-07-01 RX ADMIN — LEVOTHYROXINE SODIUM 112 MCG: 112 TABLET ORAL at 09:06

## 2024-07-01 RX ADMIN — PIPERACILLIN AND TAZOBACTAM 4.5 G: 4; .5 INJECTION, POWDER, FOR SOLUTION INTRAVENOUS at 00:27

## 2024-07-01 RX ADMIN — PIPERACILLIN AND TAZOBACTAM 4.5 G: 4; .5 INJECTION, POWDER, FOR SOLUTION INTRAVENOUS at 13:19

## 2024-07-01 RX ADMIN — CARBAMAZEPINE 150 MG: 100 SUSPENSION ORAL at 21:07

## 2024-07-01 RX ADMIN — OXYCODONE HYDROCHLORIDE AND ACETAMINOPHEN 3000 MG: 500 TABLET ORAL at 09:05

## 2024-07-01 RX ADMIN — Medication 40 MG: at 09:04

## 2024-07-01 RX ADMIN — AZITHROMYCIN MONOHYDRATE 500 MG: 500 INJECTION, POWDER, LYOPHILIZED, FOR SOLUTION INTRAVENOUS at 02:43

## 2024-07-01 RX ADMIN — ONDANSETRON 4 MG: 2 INJECTION INTRAMUSCULAR; INTRAVENOUS at 00:25

## 2024-07-01 RX ADMIN — Medication 100 MCG: at 09:05

## 2024-07-01 RX ADMIN — POTASSIUM & SODIUM PHOSPHATES POWDER PACK 280-160-250 MG 1 PACKET: 280-160-250 PACK at 09:06

## 2024-07-01 RX ADMIN — CYANOCOBALAMIN TAB 1000 MCG 1000 MCG: 1000 TAB at 09:05

## 2024-07-01 ASSESSMENT — VISUAL ACUITY
OS: NOT TESTED
OD: NOT TESTED
OU: NOT TESTED
OD: NOT TESTED
OS: NOT TESTED
OD: NOT TESTED
OU: NOT TESTED
OS: NOT TESTED
OU: NOT TESTED

## 2024-07-01 ASSESSMENT — ACTIVITIES OF DAILY LIVING (ADL)
ADLS_ACUITY_SCORE: 61
ADLS_ACUITY_SCORE: 63
ADLS_ACUITY_SCORE: 61
ADLS_ACUITY_SCORE: 51
ADLS_ACUITY_SCORE: 63
ADLS_ACUITY_SCORE: 61
ADLS_ACUITY_SCORE: 63
ADLS_ACUITY_SCORE: 61
ADLS_ACUITY_SCORE: 63
ADLS_ACUITY_SCORE: 41
ADLS_ACUITY_SCORE: 61
ADLS_ACUITY_SCORE: 63
ADLS_ACUITY_SCORE: 63
DEPENDENT_IADLS:: COOKING;LAUNDRY;SHOPPING;MEAL PREPARATION;MEDICATION MANAGEMENT;TRANSPORTATION;CLEANING
ADLS_ACUITY_SCORE: 51
ADLS_ACUITY_SCORE: 61
ADLS_ACUITY_SCORE: 63
ADLS_ACUITY_SCORE: 61
ADLS_ACUITY_SCORE: 63
ADLS_ACUITY_SCORE: 61

## 2024-07-01 NOTE — ED NOTES
Bed: ED25  Expected date: 6/30/24  Expected time: 7:50 PM  Means of arrival: Ambulance  Comments:  Kerri 3 63M altered; TBI hx

## 2024-07-01 NOTE — ED TRIAGE NOTES
BIBA from home for increased lethargy, groaning, generalized weakness per mother who is his caretaker. Hx of TBI, stroke, seizure disorder. WBC and bedridden. Initial BP was in the 80's for EMS, but is 102/59.

## 2024-07-01 NOTE — ED PROVIDER NOTES
Emergency Department Note      History of Present Illness     Chief Complaint   Altered Mental Status      HPI   Keyon Farias is a 63 year old male with a history of traumatic brain injury, spastic hemiplegia, wheelchair-bound, nonverbal, dysphagia who presents to the emergency department via EMS for evaluation of altered mental status. EMS reports that the patient's mother said that she noticed he was more lethargic for the past couple of days and feels that he is different than baseline. EMS states that based on their current and past observations that the patient is more uncomfortable, and looks more thin and frail. EMS reports patient does not feel warm to the touch. EMS reports a blood pressure in the 80s and a blood sugar of 133 during first contact with the patient. EMS denies fever or diarrhea for the patient.  Unable to get IV, repeat BP 90s.  Mother also expressed concern for possible seizure.  Paramedics did not witness any seizure activity but appears to be trying to vomit at times.    Independent Historian   EMS as detailed above.    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  DVT  GERD  Panhypopituitarism  Pneumonia  Seizures  Sepsis  Septic shock  Spastic hemiplegia  Thyroid disease  Tracheostomy care  Unspecific cerebral artery occlusion with cerebral infarction  UTI  Ventricular fibrillation  Ventricular tachyarrhythmia  Thrombocytopenia  Leukocytosis  Esophagitis  Dysphagia  Pancreatitis  Cardiac arrest  Hyperlipidemia  Bladder calculus  Hemiparesis  Panhypopituitarism  Atelectasis  Hyponatremia  Appendicitis    Medications    Albuterol  Bacitracin  Briviact  Tegretol  Celexa  Robinul  Mucinex  Cortaid  Cortef  Insulin  Levothyroxine  Reglan  Micatin  Bactroban  Protonix  Transderm  Depotestosterone  Mucomyst  Aspirin  Brivaracetam  Hydrocortone  Optivar  Benefiber    Surgical History   Head and neck surgery  IR gasto jejunostomy tube change  IR PICC exchange left  Laparoscopic appendectomy  Orthopedic surgery  Tracheostomy  Vascular surgery    Physical Exam     Patient Vitals for the past 24 hrs:   BP Temp Temp src Pulse Resp SpO2 Weight   07/01/24 0210 94/55 98.2  F (36.8  C) Axillary 90 14 95 % 71.5 kg (157 lb 10.1 oz)   07/01/24 0115 97/64 -- -- 95 15 95 % --   07/01/24 0008 -- 99.1  F (37.3  C) Rectal -- -- -- --   06/30/24 2306 -- -- -- 91 20 99 % --   06/30/24 2300 115/62 -- -- 82 -- -- --   06/30/24 2241 -- -- -- 86 23 98 % --   06/30/24 2231 97/74 -- -- 87 -- 96 % --   06/30/24 2211 122/61 -- -- -- -- -- --   06/30/24 2157 115/69 -- -- -- -- 100 % --   06/30/24 2127 111/70 -- -- 93 17 99 % --   06/30/24 2117 100/72 -- -- 85 -- 100 % --   06/30/24 2003 -- -- -- -- 22 -- --   06/30/24 2002 -- 97.5  F (36.4  C) -- -- -- -- --   06/30/24 1954 -- -- -- -- -- 99 % --   06/30/24 1952 102/59 -- -- 62 -- -- --     Physical Exam    Eyes:               Sclera white; Pupils are equal and round, eyes open, looks towards his left as I move for the exam  ENT:                External ears and nares normal, mucous membranes moist, moaning, yawns once during H&P  CV:                  Rate as above with regular rhythm   Resp:               Breath sounds clear and equal bilaterally but difficult to appreciate given lack of deep breathing                          Non-labored, no retractions or accessory muscle use  GI:                   Abdomen is soft, non-tender, non-distended                          No rebound tenderness or peritoneal features  MS:                  Contractures  Skin:                Warm and dry, no rash  Neuro:              Awake, some spontaneous movements    Diagnostics     Lab Results   Labs Ordered and Resulted from Time of ED Arrival to Time of ED Departure   COMPREHENSIVE METABOLIC PANEL - Abnormal       Result Value    Sodium 122 (*)     Potassium 3.4      Carbon Dioxide (CO2) 27      Anion Gap 12      Urea Nitrogen 18.0      Creatinine 0.91      GFR Estimate >90      Calcium 8.5 (*)     Chloride 83 (*)     Glucose 95      Alkaline Phosphatase 100      AST 23      ALT 20      Protein Total 7.5      Albumin 3.8      Bilirubin Total 0.2     BLOOD GAS VENOUS - Abnormal    pH Venous 7.44 (*)     pCO2 Venous 47      pO2 Venous 54 (*)     Bicarbonate Venous 31 (*)     Base Excess/Deficit Venous 5.9 (*)     FIO2 21      Oxyhemoglobin Venous 87 (*)     O2 Sat, Venous 88.5 (*)    LACTIC ACID WHOLE BLOOD WITH 1X REPEAT IN 2 HR WHEN >2 - Abnormal    Lactic Acid, Initial 2.3 (*)    LIPASE - Abnormal    Lipase 107 (*)    ROUTINE UA WITH MICROSCOPIC - Abnormal    Color Urine Yellow      Appearance Urine Clear      Glucose Urine Negative      Bilirubin Urine Negative      Ketones Urine Negative      Specific Gravity Urine 1.024      Blood Urine Negative      pH Urine 7.5 (*)     Protein Albumin Urine Negative      Urobilinogen Urine Normal      Nitrite Urine Negative      Leukocyte Esterase Urine Negative      RBC Urine <1      WBC Urine 1      Squamous Epithelials Urine <1     CBC WITH PLATELETS AND DIFFERENTIAL - Abnormal    WBC Count 13.7 (*)     RBC Count 5.30      Hemoglobin 14.3      Hematocrit 42.1      MCV 79      MCH 27.0      MCHC 34.0      RDW 14.8      Platelet Count 208      % Neutrophils 71      % Lymphocytes 16      % Monocytes 8      % Eosinophils 4      % Basophils 1      % Immature Granulocytes 0      NRBCs per 100 WBC 0      Absolute Neutrophils 9.7 (*)     Absolute Lymphocytes 2.2      Absolute Monocytes 1.1      Absolute Eosinophils 0.5      Absolute Basophils 0.1      Absolute Immature Granulocytes 0.0      Absolute NRBCs  0.0     LACTIC ACID WHOLE BLOOD - Abnormal    Lactic Acid 2.3 (*)    PROCALCITONIN - Normal    Procalcitonin 0.07     INFLUENZA A/B, RSV, & SARS-COV2 PCR - Normal    Influenza A PCR Negative      Influenza B PCR Negative      RSV PCR Negative      SARS CoV2 PCR Negative     AMMONIA - Normal    Ammonia 37     ETHYL ALCOHOL LEVEL - Normal    Alcohol ethyl <0.01     URINE CULTURE   BLOOD CULTURE   BLOOD CULTURE       Imaging   CT Chest/Abdomen/Pelvis w Contrast   Final Result   IMPRESSION:      1.  Patchy areas of groundglass and more consolidated opacity involving the posterior right lower lobe and to a lesser degree the posterior left lower lobe and right middle lobe, overall similar in distribution as compared to the prior chest CT and again    suspicious for multifocal aspiration pneumonia.   2.  Large stool throughout the colon, compatible with constipation. Additionally there is a massive rectal stool ball with mild associated rectal wall thickening, findings suggestive of mild stercoral colitis. No definite evidence of free intraperitoneal    gas or pneumatosis.    3.  Interval enlargement of a cystic lesion of the pancreatic body/tail junction measuring 3.1 x 3.1 cm currently, previously 2.9 x 2.5 cm on the CT from 09/09/2023. While pseudocyst or area of walled off necrosis as sequela of remote pancreatitis    remains possible, given the enlarging size, mucinous cystic neoplasm of the pancreas is also a diagnostic consideration and EUS is recommended for further evaluation, as per guidelines listed below.      REFERENCE:   Revisions of international consensus [place] guidelines for the management of IPMN of the pancreas. Pancreatology 2017;17(5):738-753.      All cysts with worrisome features (including those at least 30 mm): EUS               Head CT w/o contrast   Final Result   IMPRESSION:   1.  No acute intracranial process.          Independent Interpretation   CT Head: No intracranial hemorrhage.    ED  Course      Medications Administered   Medications   acetaminophen (TYLENOL) tablet 650 mg (has no administration in time range)   albuterol (PROVENTIL) neb solution 2.5 mg (has no administration in time range)   Brivaracetam (BRIVIACT) solution 100 mg (has no administration in time range)   carBAMazepine (TEGretol) suspension 100 mg (has no administration in time range)   carBAMazepine (TEGretol) suspension 150 mg (has no administration in time range)   citalopram (celeXA) tablet 10 mg (has no administration in time range)   cyanocobalamin (VITAMIN B-12) tablet 1,000 mcg (has no administration in time range)   glycopyrrolate (ROBINUL) tablet 1 mg (has no administration in time range)   guaiFENesin (MUCINEX) 12 hr tablet 600 mg (has no administration in time range)   hydrocortisone (CORTEF) tablet 15 mg (has no administration in time range)   hydrocortisone (CORTEF) half-tab 7.5 mg (has no administration in time range)   levothyroxine (SYNTHROID/LEVOTHROID) tablet 112 mcg (has no administration in time range)   multivitamin, therapeutic (THERA-VIT) tablet 1 tablet (has no administration in time range)   pantoprazole (PROTONIX) 2 mg/mL suspension 40 mg (has no administration in time range)   potassium & sodium phosphates (NEUTRA-PHOS) Packet 1 packet (has no administration in time range)   sodium bicarbonate tablet 325 mg (has no administration in time range)   vitamin D3 (CHOLECALCIFEROL) tablet 100 mcg (has no administration in time range)   vitamin C (ASCORBIC ACID) tablet 3,000 mg (has no administration in time range)   piperacillin-tazobactam (ZOSYN) 4.5 g vial to attach to  mL bag (has no administration in time range)   azithromycin (ZITHROMAX) 500 mg vial to attach to  mL bag (500 mg Intravenous $New Bag 7/1/24 7238)   LORazepam (ATIVAN) injection 2 mg (has no administration in time range)   lidocaine 1 % 0.1-1 mL (has no administration in time range)   lidocaine (LMX4) cream (has no administration  in time range)   sodium chloride (PF) 0.9% PF flush 3 mL (has no administration in time range)   sodium chloride (PF) 0.9% PF flush 3 mL (has no administration in time range)   calcium carbonate (TUMS) chewable tablet 1,000 mg (has no administration in time range)   sodium chloride 0.9 % infusion (has no administration in time range)   polyethylene glycol (MIRALAX) Packet 17 g (has no administration in time range)   ondansetron (ZOFRAN ODT) ODT tab 4 mg (has no administration in time range)     Or   ondansetron (ZOFRAN) injection 4 mg (has no administration in time range)   Brivaracetam (BRIVIACT) solution 100 mg (100 mg Per Feeding Tube $Given 6/30/24 2251)   carBAMazepine (TEGretol) suspension 150 mg (150 mg Per Feeding Tube $Given 7/1/24 0006)   Saline (65 mLs As instructed $Given 6/30/24 2140)   iopamidol (ISOVUE-370) solution 83 mL (83 mLs Intravenous $Given 6/30/24 2140)   sodium chloride 0.9% BOLUS 250 mL (0 mLs Intravenous Stopped 7/1/24 0036)   piperacillin-tazobactam (ZOSYN) 4.5 g vial to attach to  mL bag (4.5 g Intravenous $New Bag 7/1/24 0027)   ondansetron (ZOFRAN) injection 4 mg (4 mg Intravenous $Given 7/1/24 0025)       Procedures   Procedures     Discussion of Management   Per ED course    Social Determinants of Health adding to complexity of care   None    ED Course   ED Course as of 07/01/24 0342   Ginette Jun 30, 2024 1951 I initially assessed the patient and obtained the above history and physical exam.     2104 His mother called.  Noted that his eyes were up in his head and he seemed stiff worried about possible seizure, couldn't get the blood pressure cuff to give a reading.  Brief.  Very sleepy the past few days.  No fever at home.  Used to get seizures before his two seizure medications.  Due for a dose at 9pm.     2105 I talked with the patient's mother.   2213 IV fluids currently running, stopped due to hyponatremia to limit rapid correction.   Mon Jul 01, 2024 0009 I spoke with   "Erica, hospitalist, regarding admission       Medical Decision Making / Diagnosis     CMS Diagnoses: The patient has signs of Severe Sepsis          The patient has signs of Severe Sepsis as evidenced by:    1. 2 SIRS criteria, AND  2. Suspected infection, AND   3. Organ dysfunction: Lactic Acidosis with value >2.0    Time severe sepsis diagnosis confirmed: 10:52 PM  06/30/24 as this was the time when  infection was identified on CT read, I saw the read around 11:30 PM      3 Hour Severe Sepsis Bundle Completion:    1. Initial Lactic Acid Result:   Recent Labs   Lab Test 06/30/24  2257 06/30/24 2022 04/29/24  0136   LACT 2.3* 2.3* 0.8     2. Blood Cultures before Antibiotics: Yes  3. Broad Spectrum Antibiotics Administered:  yes       Anti-infectives (From admission through now)      Start     Dose/Rate Route Frequency Ordered Stop    06/30/24 2350  piperacillin-tazobactam (ZOSYN) 4.5 g vial to attach to  mL bag        Note to Pharmacy: For [facility], [facility] and [facility]: For Zosyn-naive patients, use the \"Zosyn initial dose + extended infusion\" order panel.    4.5 g  over 30 Minutes Intravenous ONCE 06/30/24 2345 07/01/24 0057            4. Is initial hypotension present?     No (IV fluid bolus NOT required). IV Fluid volume administered: less than 1L, bolus stopped due to hyponatremia                    Severe Sepsis reassessment:  1. Repeat Lactic Acid Level within 6 hours of time zero: Stable  2. No    MDM   Initial concern for seizures, infection, electrolyte derangements, ALMAS, weight loss causing higher effects of medications, among others.  Initial lactic potentially due to seizure if that is what mother noted.  No fever, no skin source of infection.  WBC elevation is only SIRS criteria.  Waiting to initiate antibiotics unless infection identified.  Hyponatremia at a level that should be gradually corrected, bolus stopped.   UA without infection.  CT with multiple findings.  I ordered antibiotics after I saw the " read concerning for pneumonia.  History of aspiration and broad spectrum coverage ordered including atypical coverage.  Will need further evaluation of pancreatic cyst and management of stools.      Disposition   The patient was admitted to the hospital.     Diagnosis     ICD-10-CM    1. Severe sepsis (H)  A41.9     R65.20       2. Hyponatremia  E87.1       3. Pneumonia of both lower lobes due to infectious organism  J18.9       4. Constipation, unspecified constipation type  K59.00       5. Pancreatic cyst  K86.2     Enlarging, needs further evaluation           Discharge Medications   Current Discharge Medication List            Scribe Disclosure:  I, Zay Park, am serving as a scribe at 7:57 PM on 6/30/2024 to document services personally performed by Jennie Arguelles MD based on my observations and the provider's statements to me.        Jennie Arguelles MD  07/01/24 0355
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
## 2024-07-01 NOTE — PROGRESS NOTES
RECEIVING UNIT ED HANDOFF REVIEW    ED Nurse Handoff Report was reviewed by: Mariana Duncan RN on July 1, 2024 at 1:52 AM

## 2024-07-01 NOTE — PROGRESS NOTES
[facility] EEG #  portable, ordered by Dr. Soumya León.     Pt is mentally challenged in normal state.   Appearing sick, has a very bad cough.    Obtunded to some degree but did open eyes when I asked him to briefly.     Not sure what normal functioning level is compared to his non-verbal state currently.   No activations.

## 2024-07-01 NOTE — PROVIDER NOTIFICATION
MD Notification    Notified Person: MD    Notified Person Name: Cele Maldonado    Notification Date/Time: 7/1/24 0259    Notification Interaction: Vocera    Purpose of Notification: FYI: pt has enema ordered but had L loose BM upon arrival to unit.    Orders Received: CT showed a massive rectal stool ball, still needs enema     Comments:

## 2024-07-01 NOTE — CONSULTS
Care Management Initial Consult  Spoke with patients mother Savannah regarding plan of care and discharge plans.  Savannah states patient continues to be dependent with all his cares. He has PCA for 6 hrs per day in the evening and a [place] an aide  at night. Savannah is patients PCA. Savannah reported she has assistance from a girl during the day time 4-5 hrs 4 days /weak.  She gets assistance with bathing patients and dressing him and having him sit on the wheelchair. They have a lift that they use for patient transfer. Savannah manages patients G-tube feeding.   Patients  is Bisi and her #is  [phone]  Open to Home Health Care for RN PT and speech  [phone]  Care coordinator will cont to follow for discharge needs.       General Information  Assessment completed with: Parents, Mother Savannah  Type of CM/SW Visit: Initial Assessment    Primary Care Provider verified and updated as needed: Yes   Readmission within the last 30 days: no previous admission in last 30 days      Reason for Consult: discharge planning  Advance Care Planning: Advance Care Planning Reviewed: present on chart          Communication Assessment  Patient's communication style: spoken language (English or Bilingual)    Hearing Difficulty or Deaf: no   Wear Glasses or Blind: no    Cognitive  Cognitive/Neuro/Behavioral: all  Level of Consciousness: alert  Arousal Level: arouses to touch/gentle shaking  Orientation: other (see comments) (HECTOR, non-verbal at baseline)  Mood/Behavior: calm  Best Language: 3 - Mute  Speech: other (see comments) (Non-verbal)    Living Environment:   People in home:  (Mother and PCA)     Current living Arrangements: house      Able to return to prior arrangements: yes       Family/Social Support:  Care provided by: homecare agency, parent(s)  Provides care for: no one  Marital Status: Single  Parent(s)          Description of Support System: Involved    Support Assessment: Adequate family and caregiver  support    Current Resources:   Patient receiving home care services: Yes  Skilled Home Care Services: Home Health Aid  Community Resources: [facility]  Equipment currently used at home: hospital bed, lift device, wheelchair, manual  Supplies currently used at home: Incontinence Supplies, Nutritional Supplements, Enteral Nutrition & Supplies    Employment/Financial:  Employment Status: disabled        Financial Concerns:             Does the patient's insurance plan have a 3 day qualifying hospital stay waiver?  No    Lifestyle & Psychosocial Needs:  Social Determinants of Health     Food Insecurity: Not on file   Depression: Not at risk (4/1/2024)    PHQ-2     PHQ-2 Score: 1   Housing Stability: Not on file   Tobacco Use: Medium Risk (6/4/2024)    Patient History     Smoking Tobacco Use: Former     Smokeless Tobacco Use: Never     Passive Exposure: Not on file   Financial Resource Strain: Not on file   Alcohol Use: Not on file   Transportation Needs: Not on file   Physical Activity: Not on file   Interpersonal Safety: Not on file   Stress: Not on file   Social Connections: Not on file   Health Literacy: Not on file       Functional Status:  Prior to admission patient needed assistance:   Dependent ADLs:: Bathing, Dressing, Eating, Grooming, Incontinence, Transfers, Positioning, Wheelchair-with assist, Toileting  Dependent IADLs:: Cooking, Laundry, Shopping, Meal Preparation, Medication Management, Transportation, Cleaning  Assesssment of Functional Status: At functional baseline    Mental Health Status:  Mental Health Status: No Current Concerns       Chemical Dependency Status:  Chemical Dependency Status: No Current Concerns             Values/Beliefs:  Spiritual, Cultural Beliefs, Pent[employer]alism Practices, Values that affect care: no                 Anita Casillas RN    [phone]

## 2024-07-01 NOTE — PHARMACY-ADMISSION MEDICATION HISTORY
Pharmacist Admission Medication History    Admission medication history is complete. The information provided in this note is only as accurate as the sources available at the time of the update.    Information Source(s): Family member - Pt mother Savannah via phone    Pertinent Information: None    Changes made to PTA medication list:  Added: None  Deleted: Calcium/magnesium supplement, scopalamine patch - pt no longer taking  Changed: None    Allergies reviewed with patient and updates made in EHR: unable to assess    Medication History Completed By: Ann-Marie Garza [facility] 6/30/2024 9:54 PM    PTA Med List   Medication Sig Last Dose    acetaminophen (TYLENOL) 325 MG tablet Take 650 mg by mouth every 6 hours as needed for mild pain PRN    albuterol (PROVENTIL) (5 MG/ML) 0.5% neb solution Take 0.5 mLs (2.5 mg) by nebulization every 6 hours as needed for shortness of breath, wheezing or cough 0700 1100 1500 1900 with mucomyst PRN    bacitracin 500 UNIT/GM external ointment Apply topically daily as needed for wound care To PEG site. PRN    Brivaracetam (BRIVIACT) 10 MG/ML solution 100 mg by Oral or Feeding Tube route 2 times daily 0900, 2100 6/30/2024 at AM x 1    carBAMazepine (TEGRETOL) 100 MG/5ML suspension 7.5 mLs (150 mg) by Oral or Feeding Tube route 3 times daily At 06:00, 12:00, and 24:00 for seizures 6/30/2024 at 0600 1200 doses    carBAMazepine (TEGRETOL) 100 MG/5ML suspension 100 mg by Per Feeding Tube route daily Take at 1800 6/30/2024 at 1900    citalopram (CELEXA) 10 MG tablet Take 10 mg by mouth daily 6/29/2024    COMPOUNDED NON-CONTROLLED SUBSTANCE (CMPD RX) - PHARMACY TO MIX COMPOUNDED MEDICATION Scopolamine 0.4mg capsules - take  2 capsule by feeding tube three times daily as needed PRN    Cyanocobalamin (VITAMIN B-12 PO) 1,000 mcg by Per Feeding Tube route daily 6/30/2024    glycopyrrolate (ROBINUL) 1 MG tablet TAKE 1 TABLET(1 MG) BY MOUTH TWICE DAILY AS NEEDED FOR SECRETIONS 6/30/2024 at Am     guaiFENesin (MUCINEX) 600 MG 12 hr tablet Take 1 tablet (600 mg) by mouth 2 times daily as needed for congestion PRN    hydrocortisone (CORTAID) 1 % external cream Apply topically 2 times daily as needed Apply to reddened memo areas as needed PRN    hydrocortisone (CORTEF) 5 MG tablet Take 15 mg (3 tablets) in the morning and 7.5 mg (1.5 tablet)  at 2:00 PM. During illness patient takes more as a stress dose. Please increase the dose as directed. (Patient taking differently: Take 15 mg (3 tablets) in the morning and 7.5 mg (1.5 tablet)  at 6:00 PM. Per feeding tube. During illness patient takes more as a stress dose. Mother reports doubling each dose for fever) 6/30/2024 at AM    levothyroxine (SYNTHROID/LEVOTHROID) 112 MCG tablet TAKE 1 TABLET(112 MCG) BY MOUTH DAILY 6/30/2024    metoclopramide (REGLAN) 10 MG/10ML SOLN solution Take 10 mLs (10 mg) by mouth 4 times daily (before meals and nightly) 0800, 1200, 1600, 2000 Disconnects bag before administration, then waits 45 mins before reconnecting after giving the medication 6/30/2024 at x 2 doses    miconazole (MICATIN) 2 % external powder Apply topically 2 times daily as needed PRN    multivitamin, therapeutic (THERA-VIT) TABS tablet 1 tablet by Per Feeding Tube route daily 6/30/2024 at AM    mupirocin (BACTROBAN) 2 % external ointment APPLY TOPICALLY TO THE AFFECTED AREA TWICE DAILY AS NEEDED PRN    pantoprazole (PROTONIX) 2 mg/mL SUSP suspension TAKE 20ML PER FEEDING TUBE DAILY 6/30/2024 at AM    potassium & sodium phosphates (NEUTRA-PHOS) 280-160-250 MG Packet Take 1 packet by mouth daily 6/30/2024 at AM    testosterone cypionate (DEPOTESTOSTERONE) 200 MG/ML injection INJECT 0.25ML IN THE MUSCLE ONCE A WEEK. Please schedule follow up for further refills.     UNABLE TO FIND Take 0.25 teaspoonful by mouth daily MEDICATION NAME: pink salt 1/8 teaspoon po bid (instead of sodium bicarb). 6/30/2024    vitamin C (ASCORBIC ACID) 1000 MG TABS 3,000 mg by Oral or Feeding  Tube route daily 6/30/2024    vitamin D3 (CHOLECALCIFEROL) 2000 units (50 mcg) tablet 4,000 Units by Per Feeding Tube route daily 6/30/2024

## 2024-07-01 NOTE — ED NOTES
Mother arrived at the bedside. Patient cleaned up of stool and urine. Brief and linens changed. UA collected.
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
## 2024-07-01 NOTE — PROGRESS NOTES
Had 1 M loose BM upon arrival to unit @ 0200 and 1 sm BM an hour later.    Soap suds enema given @ 0430; had L loose/formed BM and a sm loose BM since enema administration.

## 2024-07-02 ENCOUNTER — ANESTHESIA EVENT (OUTPATIENT)
Dept: GASTROENTEROLOGY | Facility: CLINIC | Age: 64
DRG: 871 | End: 2024-07-02
Payer: MEDICARE

## 2024-07-02 ENCOUNTER — APPOINTMENT (OUTPATIENT)
Dept: GENERAL RADIOLOGY | Facility: CLINIC | Age: 64
DRG: 871 | End: 2024-07-02
Attending: INTERNAL MEDICINE
Payer: MEDICARE

## 2024-07-02 LAB
ANION GAP SERPL CALCULATED.3IONS-SCNC: 10 MMOL/L (ref 7–15)
BACTERIA UR CULT: NORMAL
BUN SERPL-MCNC: 8 MG/DL (ref 8–23)
CALCIUM SERPL-MCNC: 8.4 MG/DL (ref 8.8–10.2)
CHLORIDE SERPL-SCNC: 97 MMOL/L (ref 98–107)
CREAT SERPL-MCNC: 0.8 MG/DL (ref 0.67–1.17)
DEPRECATED HCO3 PLAS-SCNC: 23 MMOL/L (ref 22–29)
EGFRCR SERPLBLD CKD-EPI 2021: >90 ML/MIN/1.73M2
ENTEROCOCCUS FAECALIS: NOT DETECTED
ENTEROCOCCUS FAECIUM: NOT DETECTED
GLUCOSE BLDC GLUCOMTR-MCNC: 92 MG/DL (ref 70–99)
GLUCOSE SERPL-MCNC: 89 MG/DL (ref 70–99)
LISTERIA SPECIES (DETECTED/NOT DETECTED): NOT DETECTED
POTASSIUM SERPL-SCNC: 4.5 MMOL/L (ref 3.4–5.3)
SODIUM SERPL-SCNC: 130 MMOL/L (ref 135–145)
STAPHYLOCOCCUS AUREUS: NOT DETECTED
STAPHYLOCOCCUS EPIDERMIDIS: NOT DETECTED
STAPHYLOCOCCUS LUGDUNENSIS: NOT DETECTED
STAPHYLOCOCCUS SPECIES: DETECTED
STREPTOCOCCUS AGALACTIAE: NOT DETECTED
STREPTOCOCCUS ANGINOSUS GROUP: NOT DETECTED
STREPTOCOCCUS PNEUMONIAE: NOT DETECTED
STREPTOCOCCUS PYOGENES: NOT DETECTED
STREPTOCOCCUS SPECIES: NOT DETECTED

## 2024-07-02 PROCEDURE — 258N000003 HC RX IP 258 OP 636: Performed by: INTERNAL MEDICINE

## 2024-07-02 PROCEDURE — 250N000013 HC RX MED GY IP 250 OP 250 PS 637: Performed by: STUDENT IN AN ORGANIZED HEALTH CARE EDUCATION/TRAINING PROGRAM

## 2024-07-02 PROCEDURE — 250N000013 HC RX MED GY IP 250 OP 250 PS 637: Performed by: INTERNAL MEDICINE

## 2024-07-02 PROCEDURE — 82565 ASSAY OF CREATININE: CPT | Performed by: STUDENT IN AN ORGANIZED HEALTH CARE EDUCATION/TRAINING PROGRAM

## 2024-07-02 PROCEDURE — 99232 SBSQ HOSP IP/OBS MODERATE 35: CPT | Performed by: STUDENT IN AN ORGANIZED HEALTH CARE EDUCATION/TRAINING PROGRAM

## 2024-07-02 PROCEDURE — 120N000001 HC R&B MED SURG/OB

## 2024-07-02 PROCEDURE — 74283 THER NMA RDCTJ INTUS/OBSTRCJ: CPT

## 2024-07-02 PROCEDURE — 82374 ASSAY BLOOD CARBON DIOXIDE: CPT | Performed by: STUDENT IN AN ORGANIZED HEALTH CARE EDUCATION/TRAINING PROGRAM

## 2024-07-02 PROCEDURE — 250N000011 HC RX IP 250 OP 636: Performed by: INTERNAL MEDICINE

## 2024-07-02 PROCEDURE — 250N000013 HC RX MED GY IP 250 OP 250 PS 637: Performed by: PSYCHIATRY & NEUROLOGY

## 2024-07-02 PROCEDURE — 36415 COLL VENOUS BLD VENIPUNCTURE: CPT | Performed by: STUDENT IN AN ORGANIZED HEALTH CARE EDUCATION/TRAINING PROGRAM

## 2024-07-02 RX ORDER — BISACODYL 10 MG
10 SUPPOSITORY, RECTAL RECTAL 2 TIMES DAILY
Status: DISCONTINUED | OUTPATIENT
Start: 2024-07-02 | End: 2024-07-02

## 2024-07-02 RX ORDER — BISACODYL 10 MG
SUPPOSITORY, RECTAL RECTAL
Status: COMPLETED
Start: 2024-07-02 | End: 2024-07-02

## 2024-07-02 RX ORDER — BISACODYL 10 MG
10 SUPPOSITORY, RECTAL RECTAL 2 TIMES DAILY
Status: COMPLETED | OUTPATIENT
Start: 2024-07-02 | End: 2024-07-06

## 2024-07-02 RX ORDER — AZITHROMYCIN 250 MG/1
250 TABLET, FILM COATED ORAL DAILY
Status: COMPLETED | OUTPATIENT
Start: 2024-07-03 | End: 2024-07-05

## 2024-07-02 RX ADMIN — BRIVARACETAM 100 MG: 10 SOLUTION ORAL at 21:47

## 2024-07-02 RX ADMIN — SODIUM BICARBONATE 325 MG: 325 TABLET ORAL at 09:49

## 2024-07-02 RX ADMIN — CARBAMAZEPINE 150 MG: 100 SUSPENSION ORAL at 20:33

## 2024-07-02 RX ADMIN — BRIVARACETAM 100 MG: 10 SOLUTION ORAL at 09:48

## 2024-07-02 RX ADMIN — BISACODYL 10 MG: 10 SUPPOSITORY RECTAL at 20:16

## 2024-07-02 RX ADMIN — CYANOCOBALAMIN TAB 1000 MCG 1000 MCG: 1000 TAB at 09:49

## 2024-07-02 RX ADMIN — Medication 100 MCG: at 09:49

## 2024-07-02 RX ADMIN — PIPERACILLIN AND TAZOBACTAM 4.5 G: 4; .5 INJECTION, POWDER, FOR SOLUTION INTRAVENOUS at 00:54

## 2024-07-02 RX ADMIN — DIATRIZOATE MEGLUMINE AND DIATRIZOATE SODIUM 480 ML: 660; 100 SOLUTION ORAL; RECTAL at 11:23

## 2024-07-02 RX ADMIN — PIPERACILLIN AND TAZOBACTAM 4.5 G: 4; .5 INJECTION, POWDER, FOR SOLUTION INTRAVENOUS at 06:51

## 2024-07-02 RX ADMIN — PIPERACILLIN AND TAZOBACTAM 4.5 G: 4; .5 INJECTION, POWDER, FOR SOLUTION INTRAVENOUS at 11:45

## 2024-07-02 RX ADMIN — Medication 15 ML: at 09:48

## 2024-07-02 RX ADMIN — POLYETHYLENE GLYCOL 3350 17 G: 17 POWDER, FOR SOLUTION ORAL at 09:52

## 2024-07-02 RX ADMIN — Medication 40 MG: at 06:51

## 2024-07-02 RX ADMIN — POLYETHYLENE GLYCOL 3350, SODIUM SULFATE ANHYDROUS, SODIUM BICARBONATE, SODIUM CHLORIDE, POTASSIUM CHLORIDE 2000 ML: 236; 22.74; 6.74; 5.86; 2.97 POWDER, FOR SOLUTION ORAL at 15:46

## 2024-07-02 RX ADMIN — CITALOPRAM HYDROBROMIDE 10 MG: 10 TABLET ORAL at 09:49

## 2024-07-02 RX ADMIN — POTASSIUM & SODIUM PHOSPHATES POWDER PACK 280-160-250 MG 1 PACKET: 280-160-250 PACK at 09:49

## 2024-07-02 RX ADMIN — BISACODYL 10 MG: 10 SUPPOSITORY RECTAL at 14:42

## 2024-07-02 RX ADMIN — OXYCODONE HYDROCHLORIDE AND ACETAMINOPHEN 3000 MG: 500 TABLET ORAL at 09:48

## 2024-07-02 RX ADMIN — AZITHROMYCIN MONOHYDRATE 500 MG: 500 INJECTION, POWDER, LYOPHILIZED, FOR SOLUTION INTRAVENOUS at 02:52

## 2024-07-02 RX ADMIN — HYDROCORTISONE 15 MG: 10 TABLET ORAL at 09:49

## 2024-07-02 RX ADMIN — LEVOTHYROXINE SODIUM 112 MCG: 112 TABLET ORAL at 09:49

## 2024-07-02 RX ADMIN — AMOXICILLIN AND CLAVULANATE POTASSIUM 1 TABLET: 875; 125 TABLET, FILM COATED ORAL at 21:47

## 2024-07-02 RX ADMIN — SODIUM CHLORIDE: 9 INJECTION, SOLUTION INTRAVENOUS at 11:32

## 2024-07-02 RX ADMIN — HYDROCORTISONE 7.5 MG: 5 TABLET ORAL at 17:47

## 2024-07-02 RX ADMIN — CARBAMAZEPINE 150 MG: 100 SUSPENSION ORAL at 09:48

## 2024-07-02 ASSESSMENT — ACTIVITIES OF DAILY LIVING (ADL)
ADLS_ACUITY_SCORE: 61
ADLS_ACUITY_SCORE: 57
ADLS_ACUITY_SCORE: 57
ADLS_ACUITY_SCORE: 61
ADLS_ACUITY_SCORE: 63
ADLS_ACUITY_SCORE: 57
ADLS_ACUITY_SCORE: 61
ADLS_ACUITY_SCORE: 63
ADLS_ACUITY_SCORE: 61
ADLS_ACUITY_SCORE: 63
ADLS_ACUITY_SCORE: 63
ADLS_ACUITY_SCORE: 61
ADLS_ACUITY_SCORE: 57
ADLS_ACUITY_SCORE: 61
ADLS_ACUITY_SCORE: 61
ADLS_ACUITY_SCORE: 57

## 2024-07-02 ASSESSMENT — VISUAL ACUITY
OU: NOT TESTED
OS: NOT TESTED
OD: NOT TESTED
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
## 2024-07-02 NOTE — PLAN OF CARE
Goal Outcome Evaluation:  Summary: R hemiplegia, frequently hospitalized for asp pneumonia, pancreatic cyst, massive rectal stool ball per GI  DATE & TIME: 07/02/24 3558-1217    Cognitive Concerns/ Orientation : HECTOR, nonverbal   BEHAVIOR & AGGRESSION TOOL COLOR: Green  CIWA SCORE: NA   ABNL VS/O2: VSS on RA ex hector. Oral suctioning provided this shift.  MOBILITY: A2 w/ cares. Bedrest  PAIN MANAGMENT: Denies  DIET: Golytely infusing at 100mL/hr per GI rec (total dose 2L). Rate confirmed with hospitalist. Needs to be shut off at midnight regardless of amount left. Can be restarted after procedure tomorrow.  BOWEL/BLADDER: Incontinent. Primo fit in place. 4 large loose, formed BM this shift  ABNL LAB/BG: Bx positive for coagulase negative staph, Na 130  DRAIN/DEVICES: L PIV NS 75mL/hr, GJ tube  TELEMETRY RHYTHM: NSR  SKIN: Scattered bruising, scabs, abrasions w/ mepilex in place.  TESTS/PROCEDURES: XR colon water soluble today, EUS tomorrow  D/C DATE: Pending  OTHER IMPORTANT INFO: Mother is legal guardian. Seizure precautions. Neuros discontinued.

## 2024-07-02 NOTE — PROVIDER NOTIFICATION
MD Notification    Notified Person: MD    Notified Person Name: Fahad Garcia    Notification Date/Time: 7/2/24 0622    Notification Interaction: Vocandi    Purpose of Notification: FYI: critical lab shows positive blood cultures from 6/30 @ 2022; gram positive cocci in clusters.    Orders Received: N/A    Comments: MD just asked for verification on current antibiotic regimen.
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
## 2024-07-02 NOTE — PROGRESS NOTES
Nonverbal at baseline. Up with lift. Turn and repo q 2hrs. Tube feeding increased to 30ml at 2000, increase by 10ml at 0400. Free water 120ml q 2hrs.  Productive think cough. Oral suction at bedside to help with secretions. Oral cares done. External catheter, WNL. Good urine output. NS infusing.
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
## 2024-07-03 ENCOUNTER — ANESTHESIA (OUTPATIENT)
Dept: GASTROENTEROLOGY | Facility: CLINIC | Age: 64
DRG: 871 | End: 2024-07-03
Payer: MEDICARE

## 2024-07-03 ENCOUNTER — MEDICAL CORRESPONDENCE (OUTPATIENT)
Dept: HEALTH INFORMATION MANAGEMENT | Facility: CLINIC | Age: 64
End: 2024-07-03

## 2024-07-03 LAB
AMYLASE FLD-CCNC: >7500 U/L
ANION GAP SERPL CALCULATED.3IONS-SCNC: 7 MMOL/L (ref 7–15)
BUN SERPL-MCNC: 5.7 MG/DL (ref 8–23)
CALCIUM SERPL-MCNC: 8.5 MG/DL (ref 8.8–10.2)
CEA FLD-MCNC: 13.6 NG/ML
CHLORIDE SERPL-SCNC: 104 MMOL/L (ref 98–107)
CREAT SERPL-MCNC: 0.7 MG/DL (ref 0.67–1.17)
DEPRECATED HCO3 PLAS-SCNC: 25 MMOL/L (ref 22–29)
EGFRCR SERPLBLD CKD-EPI 2021: >90 ML/MIN/1.73M2
GLUCOSE BLDC GLUCOMTR-MCNC: 60 MG/DL (ref 70–99)
GLUCOSE BLDC GLUCOMTR-MCNC: 79 MG/DL (ref 70–99)
GLUCOSE BLDC GLUCOMTR-MCNC: 83 MG/DL (ref 70–99)
GLUCOSE BLDC GLUCOMTR-MCNC: 87 MG/DL (ref 70–99)
GLUCOSE BLDC GLUCOMTR-MCNC: 94 MG/DL (ref 70–99)
GLUCOSE FLD-MCNC: 70 MG/DL
GLUCOSE SERPL-MCNC: 86 MG/DL (ref 70–99)
POTASSIUM SERPL-SCNC: 4.6 MMOL/L (ref 3.4–5.3)
SODIUM SERPL-SCNC: 136 MMOL/L (ref 135–145)
UPPER EUS: NORMAL

## 2024-07-03 PROCEDURE — 250N000013 HC RX MED GY IP 250 OP 250 PS 637: Performed by: PSYCHIATRY & NEUROLOGY

## 2024-07-03 PROCEDURE — 250N000011 HC RX IP 250 OP 636: Performed by: NURSE ANESTHETIST, CERTIFIED REGISTERED

## 2024-07-03 PROCEDURE — 250N000013 HC RX MED GY IP 250 OP 250 PS 637: Performed by: INTERNAL MEDICINE

## 2024-07-03 PROCEDURE — 88108 CYTOPATH CONCENTRATE TECH: CPT | Mod: TC | Performed by: INTERNAL MEDICINE

## 2024-07-03 PROCEDURE — 99232 SBSQ HOSP IP/OBS MODERATE 35: CPT | Performed by: STUDENT IN AN ORGANIZED HEALTH CARE EDUCATION/TRAINING PROGRAM

## 2024-07-03 PROCEDURE — 80048 BASIC METABOLIC PNL TOTAL CA: CPT | Performed by: STUDENT IN AN ORGANIZED HEALTH CARE EDUCATION/TRAINING PROGRAM

## 2024-07-03 PROCEDURE — 250N000013 HC RX MED GY IP 250 OP 250 PS 637: Performed by: STUDENT IN AN ORGANIZED HEALTH CARE EDUCATION/TRAINING PROGRAM

## 2024-07-03 PROCEDURE — 43242 EGD US FINE NEEDLE BX/ASPIR: CPT | Performed by: INTERNAL MEDICINE

## 2024-07-03 PROCEDURE — 250N000009 HC RX 250: Performed by: NURSE ANESTHETIST, CERTIFIED REGISTERED

## 2024-07-03 PROCEDURE — 258N000003 HC RX IP 258 OP 636: Performed by: NURSE ANESTHETIST, CERTIFIED REGISTERED

## 2024-07-03 PROCEDURE — 258N000003 HC RX IP 258 OP 636: Performed by: INTERNAL MEDICINE

## 2024-07-03 PROCEDURE — 88313 SPECIAL STAINS GROUP 2: CPT | Mod: 26 | Performed by: PATHOLOGY

## 2024-07-03 PROCEDURE — S5010 5% DEXTROSE AND 0.45% SALINE: HCPCS | Performed by: STUDENT IN AN ORGANIZED HEALTH CARE EDUCATION/TRAINING PROGRAM

## 2024-07-03 PROCEDURE — 258N000003 HC RX IP 258 OP 636: Performed by: STUDENT IN AN ORGANIZED HEALTH CARE EDUCATION/TRAINING PROGRAM

## 2024-07-03 PROCEDURE — 999N000010 HC STATISTIC ANES STAT CODE-CRNA PER MINUTE: Performed by: INTERNAL MEDICINE

## 2024-07-03 PROCEDURE — 88108 CYTOPATH CONCENTRATE TECH: CPT | Mod: 26 | Performed by: PATHOLOGY

## 2024-07-03 PROCEDURE — 43238 EGD US FINE NEEDLE BX/ASPIR: CPT | Performed by: ANESTHESIOLOGY

## 2024-07-03 PROCEDURE — 370N000017 HC ANESTHESIA TECHNICAL FEE, PER MIN: Performed by: INTERNAL MEDICINE

## 2024-07-03 PROCEDURE — 120N000001 HC R&B MED SURG/OB

## 2024-07-03 PROCEDURE — 36415 COLL VENOUS BLD VENIPUNCTURE: CPT | Performed by: STUDENT IN AN ORGANIZED HEALTH CARE EDUCATION/TRAINING PROGRAM

## 2024-07-03 PROCEDURE — 82150 ASSAY OF AMYLASE: CPT | Performed by: INTERNAL MEDICINE

## 2024-07-03 PROCEDURE — 43238 EGD US FINE NEEDLE BX/ASPIR: CPT | Performed by: NURSE ANESTHETIST, CERTIFIED REGISTERED

## 2024-07-03 RX ORDER — ONDANSETRON 2 MG/ML
INJECTION INTRAMUSCULAR; INTRAVENOUS PRN
Status: DISCONTINUED | OUTPATIENT
Start: 2024-07-03 | End: 2024-07-03

## 2024-07-03 RX ORDER — SENNOSIDES 8.6 MG
8.6 TABLET ORAL 2 TIMES DAILY
Status: DISCONTINUED | OUTPATIENT
Start: 2024-07-03 | End: 2024-07-12 | Stop reason: HOSPADM

## 2024-07-03 RX ORDER — DEXMEDETOMIDINE HYDROCHLORIDE 4 UG/ML
INJECTION, SOLUTION INTRAVENOUS PRN
Status: DISCONTINUED | OUTPATIENT
Start: 2024-07-03 | End: 2024-07-03

## 2024-07-03 RX ORDER — DEXTROSE MONOHYDRATE AND SODIUM CHLORIDE 5; .45 G/100ML; G/100ML
INJECTION, SOLUTION INTRAVENOUS CONTINUOUS
Status: DISCONTINUED | OUTPATIENT
Start: 2024-07-03 | End: 2024-07-03

## 2024-07-03 RX ORDER — FLUMAZENIL 0.1 MG/ML
0.2 INJECTION, SOLUTION INTRAVENOUS
Status: ACTIVE | OUTPATIENT
Start: 2024-07-03 | End: 2024-07-04

## 2024-07-03 RX ORDER — NALOXONE HYDROCHLORIDE 0.4 MG/ML
0.4 INJECTION, SOLUTION INTRAMUSCULAR; INTRAVENOUS; SUBCUTANEOUS
Status: DISCONTINUED | OUTPATIENT
Start: 2024-07-03 | End: 2024-07-12 | Stop reason: HOSPADM

## 2024-07-03 RX ORDER — LIDOCAINE 40 MG/G
CREAM TOPICAL
Status: DISCONTINUED | OUTPATIENT
Start: 2024-07-03 | End: 2024-07-03 | Stop reason: HOSPADM

## 2024-07-03 RX ORDER — PROPOFOL 10 MG/ML
INJECTION, EMULSION INTRAVENOUS CONTINUOUS PRN
Status: DISCONTINUED | OUTPATIENT
Start: 2024-07-03 | End: 2024-07-03

## 2024-07-03 RX ORDER — PROPOFOL 10 MG/ML
INJECTION, EMULSION INTRAVENOUS PRN
Status: DISCONTINUED | OUTPATIENT
Start: 2024-07-03 | End: 2024-07-03

## 2024-07-03 RX ORDER — NALOXONE HYDROCHLORIDE 0.4 MG/ML
0.2 INJECTION, SOLUTION INTRAMUSCULAR; INTRAVENOUS; SUBCUTANEOUS
Status: DISCONTINUED | OUTPATIENT
Start: 2024-07-03 | End: 2024-07-12 | Stop reason: HOSPADM

## 2024-07-03 RX ORDER — SODIUM CHLORIDE, SODIUM LACTATE, POTASSIUM CHLORIDE, CALCIUM CHLORIDE 600; 310; 30; 20 MG/100ML; MG/100ML; MG/100ML; MG/100ML
INJECTION, SOLUTION INTRAVENOUS CONTINUOUS PRN
Status: DISCONTINUED | OUTPATIENT
Start: 2024-07-03 | End: 2024-07-03

## 2024-07-03 RX ORDER — ONDANSETRON 2 MG/ML
4 INJECTION INTRAMUSCULAR; INTRAVENOUS EVERY 6 HOURS PRN
Status: DISCONTINUED | OUTPATIENT
Start: 2024-07-03 | End: 2024-07-03

## 2024-07-03 RX ORDER — LIDOCAINE HYDROCHLORIDE 20 MG/ML
INJECTION, SOLUTION INFILTRATION; PERINEURAL PRN
Status: DISCONTINUED | OUTPATIENT
Start: 2024-07-03 | End: 2024-07-03

## 2024-07-03 RX ORDER — ONDANSETRON 4 MG/1
4 TABLET, ORALLY DISINTEGRATING ORAL EVERY 6 HOURS PRN
Status: DISCONTINUED | OUTPATIENT
Start: 2024-07-03 | End: 2024-07-03

## 2024-07-03 RX ORDER — DEXTROSE MONOHYDRATE AND SODIUM CHLORIDE 5; .45 G/100ML; G/100ML
INJECTION, SOLUTION INTRAVENOUS CONTINUOUS
Status: ACTIVE | OUTPATIENT
Start: 2024-07-03 | End: 2024-07-03

## 2024-07-03 RX ADMIN — POLYETHYLENE GLYCOL 3350 17 G: 17 POWDER, FOR SOLUTION ORAL at 08:38

## 2024-07-03 RX ADMIN — LEVOTHYROXINE SODIUM 112 MCG: 112 TABLET ORAL at 08:39

## 2024-07-03 RX ADMIN — DEXTROSE AND SODIUM CHLORIDE: 5; 450 INJECTION, SOLUTION INTRAVENOUS at 14:59

## 2024-07-03 RX ADMIN — LIDOCAINE HYDROCHLORIDE 40 MG: 20 INJECTION, SOLUTION INFILTRATION; PERINEURAL at 11:39

## 2024-07-03 RX ADMIN — SENNOSIDES 8.6 MG: 8.6 TABLET, FILM COATED ORAL at 13:39

## 2024-07-03 RX ADMIN — SODIUM CHLORIDE, POTASSIUM CHLORIDE, SODIUM LACTATE AND CALCIUM CHLORIDE: 600; 310; 30; 20 INJECTION, SOLUTION INTRAVENOUS at 11:32

## 2024-07-03 RX ADMIN — CARBAMAZEPINE 150 MG: 100 SUSPENSION ORAL at 21:59

## 2024-07-03 RX ADMIN — Medication 15 ML: at 08:40

## 2024-07-03 RX ADMIN — CARBAMAZEPINE 150 MG: 100 SUSPENSION ORAL at 08:41

## 2024-07-03 RX ADMIN — CYANOCOBALAMIN TAB 1000 MCG 1000 MCG: 1000 TAB at 08:39

## 2024-07-03 RX ADMIN — OXYCODONE HYDROCHLORIDE AND ACETAMINOPHEN 3000 MG: 500 TABLET ORAL at 08:39

## 2024-07-03 RX ADMIN — Medication 100 MCG: at 08:38

## 2024-07-03 RX ADMIN — AMOXICILLIN AND CLAVULANATE POTASSIUM 1 TABLET: 875; 125 TABLET, FILM COATED ORAL at 20:50

## 2024-07-03 RX ADMIN — PHENYLEPHRINE HYDROCHLORIDE 150 MCG: 10 INJECTION INTRAVENOUS at 12:02

## 2024-07-03 RX ADMIN — ONDANSETRON 4 MG: 2 INJECTION INTRAMUSCULAR; INTRAVENOUS at 11:39

## 2024-07-03 RX ADMIN — AMOXICILLIN AND CLAVULANATE POTASSIUM 1 TABLET: 875; 125 TABLET, FILM COATED ORAL at 08:39

## 2024-07-03 RX ADMIN — DEXTROSE AND SODIUM CHLORIDE: 5; 450 INJECTION, SOLUTION INTRAVENOUS at 16:24

## 2024-07-03 RX ADMIN — HYDROCORTISONE 15 MG: 10 TABLET ORAL at 08:42

## 2024-07-03 RX ADMIN — PHENYLEPHRINE HYDROCHLORIDE 100 MCG: 10 INJECTION INTRAVENOUS at 12:06

## 2024-07-03 RX ADMIN — Medication 40 MG: at 06:40

## 2024-07-03 RX ADMIN — SODIUM CHLORIDE: 9 INJECTION, SOLUTION INTRAVENOUS at 00:55

## 2024-07-03 RX ADMIN — BRIVARACETAM 100 MG: 10 SOLUTION ORAL at 21:12

## 2024-07-03 RX ADMIN — HYDROCORTISONE 7.5 MG: 5 TABLET ORAL at 13:39

## 2024-07-03 RX ADMIN — PROPOFOL 150 MCG/KG/MIN: 10 INJECTION, EMULSION INTRAVENOUS at 11:39

## 2024-07-03 RX ADMIN — BRIVARACETAM 100 MG: 10 SOLUTION ORAL at 14:11

## 2024-07-03 RX ADMIN — AZITHROMYCIN DIHYDRATE 250 MG: 250 TABLET ORAL at 08:39

## 2024-07-03 RX ADMIN — Medication 20 MCG: at 11:49

## 2024-07-03 RX ADMIN — BISACODYL 10 MG: 10 SUPPOSITORY RECTAL at 08:40

## 2024-07-03 RX ADMIN — SODIUM BICARBONATE 325 MG: 325 TABLET ORAL at 13:39

## 2024-07-03 RX ADMIN — POTASSIUM & SODIUM PHOSPHATES POWDER PACK 280-160-250 MG 1 PACKET: 280-160-250 PACK at 08:39

## 2024-07-03 RX ADMIN — CITALOPRAM HYDROBROMIDE 10 MG: 10 TABLET ORAL at 08:39

## 2024-07-03 RX ADMIN — PROPOFOL 30 MG: 10 INJECTION, EMULSION INTRAVENOUS at 11:39

## 2024-07-03 ASSESSMENT — ACTIVITIES OF DAILY LIVING (ADL)
ADLS_ACUITY_SCORE: 65
ADLS_ACUITY_SCORE: 61
ADLS_ACUITY_SCORE: 65
ADLS_ACUITY_SCORE: 65
ADLS_ACUITY_SCORE: 61
ADLS_ACUITY_SCORE: 69
ADLS_ACUITY_SCORE: 61
ADLS_ACUITY_SCORE: 65
ADLS_ACUITY_SCORE: 65
ADLS_ACUITY_SCORE: 61
ADLS_ACUITY_SCORE: 65
ADLS_ACUITY_SCORE: 61
ADLS_ACUITY_SCORE: 65
ADLS_ACUITY_SCORE: 65
ADLS_ACUITY_SCORE: 61
ADLS_ACUITY_SCORE: 65

## 2024-07-03 ASSESSMENT — ENCOUNTER SYMPTOMS: SEIZURES: 1
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
## 2024-07-03 NOTE — ANESTHESIA POSTPROCEDURE EVALUATION
Patient: Keyon Farias    Procedure: Procedure(s):  Endoscopic ultrasound upper gastrointestinal tract (GI)       Anesthesia Type:  MAC    Note:  Disposition: Inpatient   Postop Pain Control: Uneventful            Sign Out: Well controlled pain   PONV: No   Neuro/Psych: Uneventful            Sign Out: Acceptable/Baseline neuro status   Airway/Respiratory: Uneventful            Sign Out: Acceptable/Baseline resp. status   CV/Hemodynamics: Uneventful            Sign Out: Acceptable CV status; No obvious hypovolemia; No obvious fluid overload   Other NRE: NONE   DID A NON-ROUTINE EVENT OCCUR? No           Last vitals:  Vitals Value Taken Time   /69 07/03/24 1521   Temp 36.3  C (97.3  F) 07/03/24 1521   Pulse 67 07/03/24 1521   Resp 12 07/03/24 1521   SpO2 98 % 07/03/24 1521       Electronically Signed By: Washington Epperson DO  July 3, 2024  4:06 PM

## 2024-07-03 NOTE — ANESTHESIA CARE TRANSFER NOTE
Patient: Keyon Farias    Procedure: Procedure(s):  Endoscopic ultrasound upper gastrointestinal tract (GI)       Diagnosis: Pancreas cyst [K86.2]  Diagnosis Additional Information: No value filed.    Anesthesia Type:   MAC     Note:    Oropharynx: oropharynx clear of all foreign objects and spontaneously breathing  Level of Consciousness: drowsy  Oxygen Supplementation: face mask  Level of Supplemental Oxygen (L/min / FiO2): 8  Independent Airway: airway patency satisfactory and stable  Dentition: dentition unchanged  Vital Signs Stable: post-procedure vital signs reviewed and stable  Report to RN Given: handoff report given  Patient transferred to: Phase II    Handoff Report: Identifed the Patient, Identified the Reponsible Provider, Reviewed the pertinent medical history, Discussed the surgical course, Reviewed Intra-OP anesthesia mangement and issues during anesthesia, Set expectations for post-procedure period and Allowed opportunity for questions and acknowledgement of understanding      Vitals:  Vitals Value Taken Time   BP 84/61 07/03/24 1205   Temp     Pulse 98 07/03/24 1207   Resp 9 07/03/24 1207   SpO2 97 % 07/03/24 1207   Vitals shown include unfiled device data.    Electronically Signed By: NATHANIEL Henry CRNA  July 3, 2024  12:08 PM
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
## 2024-07-03 NOTE — PLAN OF CARE
Goal Outcome Evaluation:      Summary: R hemiplegia, frequently hospitalized for asp pneumonia, pancreatic cyst, massive rectal stool ball per GI  DATE & TIME: 07/02/24 to 07/03/24 [id number]   Cognitive Concerns/ Orientation : HECTOR, nonverbal   BEHAVIOR & AGGRESSION TOOL COLOR: Green  CIWA SCORE: NA   ABNL VS/O2: VSS on RA ex hector.   MOBILITY: A2 w/ cares. Bedrest  PAIN MANAGMENT: Denies  DIET: Golytely infusing at 100mL/hr per GI rec (total dose 2L). Rate confirmed with hospitalist. Needs to be shut off at midnight regardless of amount left. Can be restarted after procedure tomorrow.  BOWEL/BLADDER: Incontinent. 4x loose BM  ABNL LAB/BG: Bx positive for coagulase negative staph, Na 130  DRAIN/DEVICES: L PIV NS 75mL/hr, GJ tube  TELEMETRY RHYTHM: NSR  SKIN: Scattered bruising, scabs, abrasions w/ mepilex in place.  TESTS/PROCEDURES: EUS tomorrow  D/C DATE: Pending  OTHER IMPORTANT INFO: Mother is legal guardian. Seizure precautions. Neuros discontinued.

## 2024-07-03 NOTE — PROGRESS NOTES
GI: EUS done, 2.6 cm pancreas tail cyst found FNA done and pending. Suspect may be related to necrotizing pancreatitis 2017.     Recs  -Clears today, can resume usual TF in am  -Augmentin x at least 5 days  -BID senna, aggressive bowel regimen  -[facility] will follow up cyst fluid studies, won't probably have results until next week    Will sign off. Call with questions..     Digna Rhodes MD  [facility]  Phone [phone] until 5 pm  Office [phone] for after hours on call provider
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
## 2024-07-03 NOTE — PLAN OF CARE
Goal Outcome Evaluation:    Summary: AMS, aspiration pneumonia, rectal stool ball  DATE & TIME: 07/03/24 7249-9873   Cognitive Concerns/ Orientation : HECOTR, nonverbal   BEHAVIOR & AGGRESSION TOOL COLOR: Green  CIWA SCORE: NA   ABNL VS/O2: VSS on room air   MOBILITY: Ax2, Lift, turn and repo  PAIN MANAGMENT: Denies  DIET: Golytely currently running at 60ml/hr, about 400ml remaining. TF to be restarted at midnight per GI recommendations   BOWEL/BLADDER: Incontinent, BM x1, external catheter in place-adequate output  ABNL LAB/BG: Bx positive for coagulase negative staph, Na 130, BG 60, 79, 94  DRAIN/DEVICES: L PIV NS 75mL/hr, GJ tube  TELEMETRY RHYTHM: discontinued  SKIN: Scattered bruising, scabs, abrasions w/ mepilex in place. Preventative mepilex on heels  TESTS/PROCEDURES: EUS completed, cyst fluid studies pending  D/C DATE: Pending improvement  OTHER IMPORTANT INFO: Mother is legal guardian. Seizure precautions. GI and Neuro following. On oral Augmentin and azithromycin

## 2024-07-04 LAB
ANION GAP SERPL CALCULATED.3IONS-SCNC: 8 MMOL/L (ref 7–15)
BUN SERPL-MCNC: 5.7 MG/DL (ref 8–23)
CALCIUM SERPL-MCNC: 8.6 MG/DL (ref 8.8–10.2)
CHLORIDE SERPL-SCNC: 97 MMOL/L (ref 98–107)
CREAT SERPL-MCNC: 0.79 MG/DL (ref 0.67–1.17)
DEPRECATED HCO3 PLAS-SCNC: 26 MMOL/L (ref 22–29)
EGFRCR SERPLBLD CKD-EPI 2021: >90 ML/MIN/1.73M2
GLUCOSE BLDC GLUCOMTR-MCNC: 116 MG/DL (ref 70–99)
GLUCOSE BLDC GLUCOMTR-MCNC: 78 MG/DL (ref 70–99)
GLUCOSE BLDC GLUCOMTR-MCNC: 85 MG/DL (ref 70–99)
GLUCOSE SERPL-MCNC: 86 MG/DL (ref 70–99)
MAGNESIUM SERPL-MCNC: 2.1 MG/DL (ref 1.7–2.3)
PHOSPHATE SERPL-MCNC: 2.3 MG/DL (ref 2.5–4.5)
PHOSPHATE SERPL-MCNC: 2.5 MG/DL (ref 2.5–4.5)
POTASSIUM SERPL-SCNC: 4.5 MMOL/L (ref 3.4–5.3)
POTASSIUM SERPL-SCNC: 4.7 MMOL/L (ref 3.4–5.3)
SODIUM SERPL-SCNC: 131 MMOL/L (ref 135–145)

## 2024-07-04 PROCEDURE — 84100 ASSAY OF PHOSPHORUS: CPT | Performed by: STUDENT IN AN ORGANIZED HEALTH CARE EDUCATION/TRAINING PROGRAM

## 2024-07-04 PROCEDURE — 250N000013 HC RX MED GY IP 250 OP 250 PS 637: Performed by: INTERNAL MEDICINE

## 2024-07-04 PROCEDURE — 250N000013 HC RX MED GY IP 250 OP 250 PS 637: Performed by: STUDENT IN AN ORGANIZED HEALTH CARE EDUCATION/TRAINING PROGRAM

## 2024-07-04 PROCEDURE — 80048 BASIC METABOLIC PNL TOTAL CA: CPT | Performed by: STUDENT IN AN ORGANIZED HEALTH CARE EDUCATION/TRAINING PROGRAM

## 2024-07-04 PROCEDURE — 36415 COLL VENOUS BLD VENIPUNCTURE: CPT | Performed by: STUDENT IN AN ORGANIZED HEALTH CARE EDUCATION/TRAINING PROGRAM

## 2024-07-04 PROCEDURE — 99232 SBSQ HOSP IP/OBS MODERATE 35: CPT | Performed by: STUDENT IN AN ORGANIZED HEALTH CARE EDUCATION/TRAINING PROGRAM

## 2024-07-04 PROCEDURE — 120N000001 HC R&B MED SURG/OB

## 2024-07-04 PROCEDURE — 250N000013 HC RX MED GY IP 250 OP 250 PS 637: Performed by: PSYCHIATRY & NEUROLOGY

## 2024-07-04 PROCEDURE — 84132 ASSAY OF SERUM POTASSIUM: CPT | Performed by: STUDENT IN AN ORGANIZED HEALTH CARE EDUCATION/TRAINING PROGRAM

## 2024-07-04 PROCEDURE — 83735 ASSAY OF MAGNESIUM: CPT | Performed by: STUDENT IN AN ORGANIZED HEALTH CARE EDUCATION/TRAINING PROGRAM

## 2024-07-04 RX ADMIN — HYDROCORTISONE 7.5 MG: 5 TABLET ORAL at 14:33

## 2024-07-04 RX ADMIN — CARBAMAZEPINE 150 MG: 100 SUSPENSION ORAL at 09:57

## 2024-07-04 RX ADMIN — Medication 100 MCG: at 09:56

## 2024-07-04 RX ADMIN — CARBAMAZEPINE 150 MG: 100 SUSPENSION ORAL at 21:17

## 2024-07-04 RX ADMIN — OXYCODONE HYDROCHLORIDE AND ACETAMINOPHEN 3000 MG: 500 TABLET ORAL at 09:56

## 2024-07-04 RX ADMIN — SODIUM BICARBONATE 325 MG: 325 TABLET ORAL at 09:57

## 2024-07-04 RX ADMIN — POLYETHYLENE GLYCOL 3350 17 G: 17 POWDER, FOR SOLUTION ORAL at 09:55

## 2024-07-04 RX ADMIN — SENNOSIDES 8.6 MG: 8.6 TABLET, FILM COATED ORAL at 21:17

## 2024-07-04 RX ADMIN — AZITHROMYCIN DIHYDRATE 250 MG: 250 TABLET ORAL at 09:57

## 2024-07-04 RX ADMIN — BRIVARACETAM 100 MG: 10 SOLUTION ORAL at 21:17

## 2024-07-04 RX ADMIN — Medication 15 ML: at 09:56

## 2024-07-04 RX ADMIN — Medication 40 MG: at 09:57

## 2024-07-04 RX ADMIN — HYDROCORTISONE 15 MG: 10 TABLET ORAL at 09:57

## 2024-07-04 RX ADMIN — AMOXICILLIN AND CLAVULANATE POTASSIUM 1 TABLET: 875; 125 TABLET, FILM COATED ORAL at 21:17

## 2024-07-04 RX ADMIN — LEVOTHYROXINE SODIUM 112 MCG: 112 TABLET ORAL at 09:56

## 2024-07-04 RX ADMIN — CYANOCOBALAMIN TAB 1000 MCG 1000 MCG: 1000 TAB at 09:56

## 2024-07-04 RX ADMIN — BRIVARACETAM 100 MG: 10 SOLUTION ORAL at 09:56

## 2024-07-04 RX ADMIN — AMOXICILLIN AND CLAVULANATE POTASSIUM 1 TABLET: 875; 125 TABLET, FILM COATED ORAL at 09:56

## 2024-07-04 RX ADMIN — CITALOPRAM HYDROBROMIDE 10 MG: 10 TABLET ORAL at 09:56

## 2024-07-04 RX ADMIN — POTASSIUM & SODIUM PHOSPHATES POWDER PACK 280-160-250 MG 1 PACKET: 280-160-250 PACK at 09:56

## 2024-07-04 ASSESSMENT — ACTIVITIES OF DAILY LIVING (ADL)
ADLS_ACUITY_SCORE: 69

## 2024-07-04 NOTE — PLAN OF CARE
Goal Outcome Evaluation:    Summary: AMS, aspiration pneumonia, rectal stool ball  DATE & TIME: 07/04/24 4264-1877  Cognitive Concerns/ Orientation : HECTOR, nonverbal   BEHAVIOR & AGGRESSION TOOL COLOR: Green  CIWA SCORE: NA   ABNL VS/O2: VSS on room air except bradycardia at times  MOBILITY: Ax2, Lift, turn and repo  PAIN MANAGMENT: Denies  DIET: TF started at midnight at 20 ml/hr, advanced to 30ml/hr. 120 ml free water flush Q2 hrs. Advance 10 ml q 8 hrs pending lytes-orders placed to check electrolytes Q8 hours. Next xheck at 1900 Goal of 60 ml x 22 hr (hold for 1 hr before/after levothyroxine dose, 8:00 to 10:00 am).  BOWEL/BLADDER: Incontinent,watery BM x2 this shift. Senna and suppository held, external catheter in place-adequate output  ABNL LAB/BG: See chart  DRAIN/DEVICES:  GJ tube, L PIV SL  SKIN: Scattered bruising, scabs, abrasions w/ mepilex in place. Preventative mepilex on heels. Open abrasion on buttocks, WOC consult placed   TESTS/PROCEDURES: EUS completed, cyst fluid studies pending sodium  D/C DATE: Tomorrow pending   OTHER IMPORTANT INFO: Mother is legal guardian. Seizure precautions. GI and Neuro signed off.
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
## 2024-07-04 NOTE — PLAN OF CARE
Goal Outcome Evaluation:      Plan of Care Reviewed With: patient    Summary: AMS, aspiration pneumonia, rectal stool ball  DATE & TIME: 07/04/24 6781-1306  Cognitive Concerns/ Orientation : HECTOR, nonverbal   BEHAVIOR & AGGRESSION TOOL COLOR: Green  CIWA SCORE: NA   ABNL VS/O2: VSS on room air except bradycardia at times  MOBILITY: Ax2, Lift, turn and repo  PAIN MANAGMENT: Denies  DIET: TF started at midnight at 20 ml/hr, advanced to 30ml/hr. 120 ml free water flush Q2 hrs. Advance 10 ml q 8 hrs pending lytes-orders placed to check electrolytes Q8 hours. Next xheck at 1900 Goal of 60 ml x 22 hr (hold for 1 hr before/after levothyroxine dose, 8:00 to 10:00 am).  BOWEL/BLADDER: Incontinent,watery BM x2 during day shift. Senna and suppository held, external catheter in place-adequate output  ABNL LAB/BG: See chart  DRAIN/DEVICES:  GJ tube, L PIV SL  SKIN: Scattered bruising, scabs, abrasions w/ mepilex in place. Preventative mepilex on heels. Open abrasion on buttocks, WOC consult placed   TESTS/PROCEDURES: EUS completed, cyst fluid studies pending sodium  D/C DATE: Tomorrow pending   OTHER IMPORTANT INFO: Mother is legal guardian. Seizure precautions. GI and Neuro signed off.

## 2024-07-04 NOTE — PLAN OF CARE
Goal Outcome Evaluation:  Summary: AMS, aspiration pneumonia, rectal stool ball  DATE & TIME: 07/03/24 6171-5817  Cognitive Concerns/ Orientation : HECTOR, nonverbal   BEHAVIOR & AGGRESSION TOOL COLOR: Green  CIWA SCORE: NA   ABNL VS/O2: VSS on room air except bradycardia   MOBILITY: Ax2, Lift, turn and repo  PAIN MANAGMENT: Denies  DIET: TF started at midnight at 20 ml/hr, 120 ml free water flush Q2 hrs. Advance 10 ml q 8 hrs pending lytes. Goal of 60 ml x 22 hr (hold for 1 hr before/after levothyroxine dose, 8:00 to 10:00 am).  BOWEL/BLADDER: Incontinent, BM x1, external catheter in place-adequate output  ABNL LAB/BG: See chart, BG 83, 78  DRAIN/DEVICES:  GJ tube  TELEMETRY RHYTHM: discontinued  SKIN: Scattered bruising, scabs, abrasions w/ mepilex in place. Preventative mepilex on heels. Open abrasion on buttocks IT, may need WOC consult needed.   TESTS/PROCEDURES: EUS completed, cyst fluid studies pending  D/C DATE: Potentially today pending stabilization in Na and mental status  OTHER IMPORTANT INFO: Mother is legal guardian. Seizure precautions. GI and Neuro signed off.

## 2024-07-05 LAB
ANION GAP SERPL CALCULATED.3IONS-SCNC: 9 MMOL/L (ref 7–15)
BACTERIA BLD CULT: ABNORMAL
BACTERIA BLD CULT: ABNORMAL
BACTERIA BLD CULT: NO GROWTH
BUN SERPL-MCNC: 7.3 MG/DL (ref 8–23)
CALCIUM SERPL-MCNC: 8.5 MG/DL (ref 8.8–10.2)
CHLORIDE SERPL-SCNC: 95 MMOL/L (ref 98–107)
CREAT SERPL-MCNC: 0.81 MG/DL (ref 0.67–1.17)
DEPRECATED HCO3 PLAS-SCNC: 25 MMOL/L (ref 22–29)
EGFRCR SERPLBLD CKD-EPI 2021: >90 ML/MIN/1.73M2
GLUCOSE BLDC GLUCOMTR-MCNC: 107 MG/DL (ref 70–99)
GLUCOSE SERPL-MCNC: 88 MG/DL (ref 70–99)
MAGNESIUM SERPL-MCNC: 2.1 MG/DL (ref 1.7–2.3)
MAGNESIUM SERPL-MCNC: 2.2 MG/DL (ref 1.7–2.3)
PHOSPHATE SERPL-MCNC: 2.2 MG/DL (ref 2.5–4.5)
PHOSPHATE SERPL-MCNC: 2.4 MG/DL (ref 2.5–4.5)
POTASSIUM SERPL-SCNC: 4.2 MMOL/L (ref 3.4–5.3)
POTASSIUM SERPL-SCNC: 4.3 MMOL/L (ref 3.4–5.3)
SODIUM SERPL-SCNC: 127 MMOL/L (ref 135–145)
SODIUM SERPL-SCNC: 129 MMOL/L (ref 135–145)

## 2024-07-05 PROCEDURE — 250N000013 HC RX MED GY IP 250 OP 250 PS 637: Performed by: INTERNAL MEDICINE

## 2024-07-05 PROCEDURE — 120N000001 HC R&B MED SURG/OB

## 2024-07-05 PROCEDURE — 83735 ASSAY OF MAGNESIUM: CPT | Performed by: STUDENT IN AN ORGANIZED HEALTH CARE EDUCATION/TRAINING PROGRAM

## 2024-07-05 PROCEDURE — 250N000013 HC RX MED GY IP 250 OP 250 PS 637: Performed by: HOSPITALIST

## 2024-07-05 PROCEDURE — 84295 ASSAY OF SERUM SODIUM: CPT | Performed by: STUDENT IN AN ORGANIZED HEALTH CARE EDUCATION/TRAINING PROGRAM

## 2024-07-05 PROCEDURE — 99232 SBSQ HOSP IP/OBS MODERATE 35: CPT | Performed by: STUDENT IN AN ORGANIZED HEALTH CARE EDUCATION/TRAINING PROGRAM

## 2024-07-05 PROCEDURE — 36415 COLL VENOUS BLD VENIPUNCTURE: CPT | Performed by: STUDENT IN AN ORGANIZED HEALTH CARE EDUCATION/TRAINING PROGRAM

## 2024-07-05 PROCEDURE — G0463 HOSPITAL OUTPT CLINIC VISIT: HCPCS

## 2024-07-05 PROCEDURE — 80048 BASIC METABOLIC PNL TOTAL CA: CPT | Performed by: STUDENT IN AN ORGANIZED HEALTH CARE EDUCATION/TRAINING PROGRAM

## 2024-07-05 PROCEDURE — 250N000013 HC RX MED GY IP 250 OP 250 PS 637: Performed by: STUDENT IN AN ORGANIZED HEALTH CARE EDUCATION/TRAINING PROGRAM

## 2024-07-05 PROCEDURE — 84100 ASSAY OF PHOSPHORUS: CPT | Performed by: STUDENT IN AN ORGANIZED HEALTH CARE EDUCATION/TRAINING PROGRAM

## 2024-07-05 PROCEDURE — 84132 ASSAY OF SERUM POTASSIUM: CPT | Performed by: STUDENT IN AN ORGANIZED HEALTH CARE EDUCATION/TRAINING PROGRAM

## 2024-07-05 PROCEDURE — 250N000013 HC RX MED GY IP 250 OP 250 PS 637: Performed by: PSYCHIATRY & NEUROLOGY

## 2024-07-05 PROCEDURE — 258N000003 HC RX IP 258 OP 636: Performed by: STUDENT IN AN ORGANIZED HEALTH CARE EDUCATION/TRAINING PROGRAM

## 2024-07-05 RX ORDER — SODIUM CHLORIDE 9 MG/ML
INJECTION, SOLUTION INTRAVENOUS CONTINUOUS
Status: DISCONTINUED | OUTPATIENT
Start: 2024-07-05 | End: 2024-07-05

## 2024-07-05 RX ADMIN — AMOXICILLIN AND CLAVULANATE POTASSIUM 1 TABLET: 875; 125 TABLET, FILM COATED ORAL at 20:37

## 2024-07-05 RX ADMIN — POLYETHYLENE GLYCOL 3350 17 G: 17 POWDER, FOR SOLUTION ORAL at 09:55

## 2024-07-05 RX ADMIN — CARBAMAZEPINE 150 MG: 100 SUSPENSION ORAL at 09:57

## 2024-07-05 RX ADMIN — SODIUM BICARBONATE 325 MG: 325 TABLET ORAL at 09:54

## 2024-07-05 RX ADMIN — Medication 40 MG: at 06:36

## 2024-07-05 RX ADMIN — CITALOPRAM HYDROBROMIDE 10 MG: 10 TABLET ORAL at 09:55

## 2024-07-05 RX ADMIN — BRIVARACETAM 100 MG: 10 SOLUTION ORAL at 09:57

## 2024-07-05 RX ADMIN — Medication 15 ML: at 09:54

## 2024-07-05 RX ADMIN — CYANOCOBALAMIN TAB 1000 MCG 1000 MCG: 1000 TAB at 09:55

## 2024-07-05 RX ADMIN — POTASSIUM & SODIUM PHOSPHATES POWDER PACK 280-160-250 MG 1 PACKET: 280-160-250 PACK at 09:54

## 2024-07-05 RX ADMIN — POTASSIUM & SODIUM PHOSPHATES POWDER PACK 280-160-250 MG 1 PACKET: 280-160-250 PACK at 22:30

## 2024-07-05 RX ADMIN — HYDROCORTISONE 15 MG: 10 TABLET ORAL at 09:54

## 2024-07-05 RX ADMIN — AZITHROMYCIN DIHYDRATE 250 MG: 250 TABLET ORAL at 09:55

## 2024-07-05 RX ADMIN — Medication 100 MCG: at 09:53

## 2024-07-05 RX ADMIN — SENNOSIDES 8.6 MG: 8.6 TABLET, FILM COATED ORAL at 09:55

## 2024-07-05 RX ADMIN — HYDROCORTISONE 7.5 MG: 5 TABLET ORAL at 15:18

## 2024-07-05 RX ADMIN — POTASSIUM & SODIUM PHOSPHATES POWDER PACK 280-160-250 MG 1 PACKET: 280-160-250 PACK at 18:48

## 2024-07-05 RX ADMIN — AMOXICILLIN AND CLAVULANATE POTASSIUM 1 TABLET: 875; 125 TABLET, FILM COATED ORAL at 09:55

## 2024-07-05 RX ADMIN — LEVOTHYROXINE SODIUM 112 MCG: 112 TABLET ORAL at 09:55

## 2024-07-05 RX ADMIN — CARBAMAZEPINE 150 MG: 100 SUSPENSION ORAL at 18:46

## 2024-07-05 RX ADMIN — SODIUM CHLORIDE: 9 INJECTION, SOLUTION INTRAVENOUS at 08:24

## 2024-07-05 RX ADMIN — OXYCODONE HYDROCHLORIDE AND ACETAMINOPHEN 3000 MG: 500 TABLET ORAL at 09:53

## 2024-07-05 RX ADMIN — BRIVARACETAM 100 MG: 10 SOLUTION ORAL at 22:30

## 2024-07-05 ASSESSMENT — ACTIVITIES OF DAILY LIVING (ADL)
ADLS_ACUITY_SCORE: 69
ADLS_ACUITY_SCORE: 65
ADLS_ACUITY_SCORE: 65
ADLS_ACUITY_SCORE: 69
ADLS_ACUITY_SCORE: 65
ADLS_ACUITY_SCORE: 65
ADLS_ACUITY_SCORE: 69
ADLS_ACUITY_SCORE: 69
ADLS_ACUITY_SCORE: 65
ADLS_ACUITY_SCORE: 69
ADLS_ACUITY_SCORE: 65
ADLS_ACUITY_SCORE: 69
ADLS_ACUITY_SCORE: 69
ADLS_ACUITY_SCORE: 65
ADLS_ACUITY_SCORE: 65
ADLS_ACUITY_SCORE: 69
ADLS_ACUITY_SCORE: 65
ADLS_ACUITY_SCORE: 69
ADLS_ACUITY_SCORE: 65
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
## 2024-07-05 NOTE — PLAN OF CARE
DATE & TIME: 7/4/24 8986-5396    Cognitive Concerns/ Orientation : HECTOR, nonverbal   BEHAVIOR & AGGRESSION TOOL COLOR: Green   ABNL VS/O2: VSS on RA, bradycardic  MOBILITY: Assist x2 with lift, fall risk. Reposition q2h  PAIN MANAGMENT: No signs of pain  DIET: TF infusing at 50ml/hr with 120ml H2O water flush q2h. Advance 10ml q8hr pending electrolytes. Next check at 1100, goal of 60ml x 22hr (hold 1hr before/after levothyroxine, 8:00 to 10:00am)  BOWEL/BLADDER: External catheter in place with adequate output. Incontinent of bowel x3.  ABNL LAB/BG: Na 129/Phos 2.4  DRAIN/DEVICES: IV SL  SKIN: Scattered bruising, scabs, abrasions, mepilex in place. Preventative mepilex on heels. Open abrasion on buttock.  D/C DATE: Discharge today or Saturday pending progress  OTHER IMPORTANT INFO: GI and Neuro signed off.

## 2024-07-05 NOTE — PROGRESS NOTES
CLINICAL NUTRITION SERVICES - REASSESSMENT NOTE    Recommendations Ordered by Registered Dietitian (RD):   Continue EN support as ordered   Malnutrition: Patient does not meet two of the above criteria necessary for diagnosing malnutrition      EVALUATION OF PROGRESS TOWARD GOALS   Diet:  NPO    Nutrition Support:  Jevity @ goal of 60 ml x 22 hr (hold for 1 hr before/after levothyroxine dose) is 1320 ml/day of formula   Total Enteral Provisions: 1980 kcals (28 kcal/kg), 85 g PRO (1.2 g/kg), 1003 ml free H2O, and 28 g fiber daily.     Intake/Tolerance:  NPO    ASSESSED NUTRITION NEEDS:  Dosing Weight 71.5 kg (actual)  Estimated Energy Needs: 7028-0934 kcals (25-30 Kcal/Kg)  Justification: maintenance  Estimated Protein Needs:  grams protein (1.2-1.5 g pro/Kg)  Justification: preservation of lean body mass    NEW FINDINGS:   General/Plan: chart reviewed. TF had been held for Golytely infusion and is now running again, advancing to goal. Stopped by room, TF infusing, and no issues      Weight: suspect currently volume up   07/05/24 0541 71.2 kg (156 lb 15.5 oz) Bed scale   07/04/24 0600 67.5 kg (148 lb 13 oz) Bed scale   07/03/24 0550 71.8 kg (158 lb 4.6 oz) Bed scale   07/02/24 0513 68.9 kg (151 lb 14.4 oz) Bed scale   07/01/24 0210 71.5 kg (157 lb 10.1 oz) Bed scale     Skin: 7/5 WOCN, Wound location: Sacrum, Friction, Shear, and Incontinence Associated Dermatitis (IAD) and possible Stage 2 PI,  Wound base: reddened skin but mostly intact throughout coccyx/sacral area with small red lightly crusted area at upper sacrum, STATUS: initial assessment --> holding off on adding Expedite for now    GI: LBM x3 on yesterday. Pt had a large rectal stool ball, has an aggressive bowel regimen, received an enema, and finished 2 L of Golytely over the past few days.    Meds: Vitamins B12, C, D and multivitamin with minerals, Levothyroxine, and NS @ 75/hr    Labs: Na 129 (L), Phos 2.4 (L - has replacement)     Previous Goals:    EN - tolerate advancement to goal and meet needs within 48-72 hours   Evaluation: Progressing    Previous Nutrition Diagnosis:   Inadequate protein-energy intake related to NPO status as evidenced by need EN support to meet nutrition needs.   Evaluation: No change    CURRENT NUTRITION DIAGNOSIS  Inadequate protein-energy intake related to NPO status as evidenced by need EN support to meet nutrition needs.     INTERVENTIONS  Recommendations / Nutrition Prescription  Continue EN support as ordered    Implementation  Enteral Nutrition     Goals  EN - tolerate goal feeds and meet % needs.      MONITORING AND EVALUATION:  Progress towards goals will be monitored and evaluated per protocol and Practice Guideline    Evangelist Aguilera RD, LD

## 2024-07-05 NOTE — PROGRESS NOTES
DATE & TIME: 4251-5227 7/5/2024  Cognitive Concerns/ Orientation : HECTOR, nonverbal  BEHAVIOR & AGGRESSION TOOL COLOR: Green  ABNL VS/O2: VSS on RA, bradycardic   MOBILITY: Assist x2 with lift, T/R q2h  PAIN MANAGMENT: No signs of pain  DIET: Tube feeding, infusing 50 mL/hr with 120 mL H2O water flush q2h. Goal of 60 ml x22 hr achieved  BOWEL/BLADDER: external cath with adequate output, incontinent stool x3   ABNL LAB/BG: Na 129, Phosphorous 2.2   DRAIN/DEVICES: L PIV infusing NS 75 mL/hr  TELEMETRY RHYTHM: N/A  SKIN: scattered bruising, scabs , abrasions. Mepilex on back; preventative mepilex on both heels. Open abrasion, red and blanchable on buttock  TESTS/PROCEDURES: N/A  D/C DATE: 1-2 days pending progress   Discharge Barriers: GI and Neuro signed off

## 2024-07-06 LAB
ANION GAP SERPL CALCULATED.3IONS-SCNC: 10 MMOL/L (ref 7–15)
BUN SERPL-MCNC: 9 MG/DL (ref 8–23)
CALCIUM SERPL-MCNC: 8.6 MG/DL (ref 8.8–10.2)
CHLORIDE SERPL-SCNC: 94 MMOL/L (ref 98–107)
CREAT SERPL-MCNC: 0.66 MG/DL (ref 0.67–1.17)
DEPRECATED HCO3 PLAS-SCNC: 23 MMOL/L (ref 22–29)
EGFRCR SERPLBLD CKD-EPI 2021: >90 ML/MIN/1.73M2
GLUCOSE SERPL-MCNC: 121 MG/DL (ref 70–99)
HOLD SPECIMEN: NORMAL
PHOSPHATE SERPL-MCNC: 2.5 MG/DL (ref 2.5–4.5)
POTASSIUM SERPL-SCNC: 4.5 MMOL/L (ref 3.4–5.3)
SODIUM SERPL-SCNC: 127 MMOL/L (ref 135–145)

## 2024-07-06 PROCEDURE — 120N000001 HC R&B MED SURG/OB

## 2024-07-06 PROCEDURE — 36415 COLL VENOUS BLD VENIPUNCTURE: CPT | Performed by: STUDENT IN AN ORGANIZED HEALTH CARE EDUCATION/TRAINING PROGRAM

## 2024-07-06 PROCEDURE — 250N000013 HC RX MED GY IP 250 OP 250 PS 637: Performed by: INTERNAL MEDICINE

## 2024-07-06 PROCEDURE — 99232 SBSQ HOSP IP/OBS MODERATE 35: CPT | Performed by: STUDENT IN AN ORGANIZED HEALTH CARE EDUCATION/TRAINING PROGRAM

## 2024-07-06 PROCEDURE — 250N000013 HC RX MED GY IP 250 OP 250 PS 637: Performed by: HOSPITALIST

## 2024-07-06 PROCEDURE — 84100 ASSAY OF PHOSPHORUS: CPT | Performed by: HOSPITALIST

## 2024-07-06 PROCEDURE — 250N000013 HC RX MED GY IP 250 OP 250 PS 637: Performed by: PSYCHIATRY & NEUROLOGY

## 2024-07-06 PROCEDURE — 250N000013 HC RX MED GY IP 250 OP 250 PS 637: Performed by: STUDENT IN AN ORGANIZED HEALTH CARE EDUCATION/TRAINING PROGRAM

## 2024-07-06 PROCEDURE — 80048 BASIC METABOLIC PNL TOTAL CA: CPT | Performed by: STUDENT IN AN ORGANIZED HEALTH CARE EDUCATION/TRAINING PROGRAM

## 2024-07-06 RX ADMIN — BRIVARACETAM 100 MG: 10 SOLUTION ORAL at 08:57

## 2024-07-06 RX ADMIN — Medication 15 ML: at 08:58

## 2024-07-06 RX ADMIN — OXYCODONE HYDROCHLORIDE AND ACETAMINOPHEN 3000 MG: 500 TABLET ORAL at 08:56

## 2024-07-06 RX ADMIN — HYDROCORTISONE 15 MG: 10 TABLET ORAL at 09:00

## 2024-07-06 RX ADMIN — Medication 40 MG: at 06:35

## 2024-07-06 RX ADMIN — HYDROCORTISONE 7.5 MG: 5 TABLET ORAL at 13:28

## 2024-07-06 RX ADMIN — LEVOTHYROXINE SODIUM 112 MCG: 112 TABLET ORAL at 08:54

## 2024-07-06 RX ADMIN — Medication 100 MCG: at 08:55

## 2024-07-06 RX ADMIN — CYANOCOBALAMIN TAB 1000 MCG 1000 MCG: 1000 TAB at 08:56

## 2024-07-06 RX ADMIN — AMOXICILLIN AND CLAVULANATE POTASSIUM 1 TABLET: 875; 125 TABLET, FILM COATED ORAL at 19:52

## 2024-07-06 RX ADMIN — SODIUM BICARBONATE 325 MG: 325 TABLET ORAL at 08:59

## 2024-07-06 RX ADMIN — POTASSIUM & SODIUM PHOSPHATES POWDER PACK 280-160-250 MG 1 PACKET: 280-160-250 PACK at 08:49

## 2024-07-06 RX ADMIN — AMOXICILLIN AND CLAVULANATE POTASSIUM 1 TABLET: 875; 125 TABLET, FILM COATED ORAL at 08:52

## 2024-07-06 RX ADMIN — CARBAMAZEPINE 150 MG: 100 SUSPENSION ORAL at 21:10

## 2024-07-06 RX ADMIN — BRIVARACETAM 100 MG: 10 SOLUTION ORAL at 21:10

## 2024-07-06 RX ADMIN — POTASSIUM & SODIUM PHOSPHATES POWDER PACK 280-160-250 MG 1 PACKET: 280-160-250 PACK at 02:41

## 2024-07-06 RX ADMIN — CARBAMAZEPINE 150 MG: 100 SUSPENSION ORAL at 08:57

## 2024-07-06 RX ADMIN — CITALOPRAM HYDROBROMIDE 10 MG: 10 TABLET ORAL at 08:54

## 2024-07-06 ASSESSMENT — ACTIVITIES OF DAILY LIVING (ADL)
ADLS_ACUITY_SCORE: 65
ADLS_ACUITY_SCORE: 71
ADLS_ACUITY_SCORE: 65
ADLS_ACUITY_SCORE: 71
ADLS_ACUITY_SCORE: 65
ADLS_ACUITY_SCORE: 71
ADLS_ACUITY_SCORE: 65
ADLS_ACUITY_SCORE: 71
ADLS_ACUITY_SCORE: 65
ADLS_ACUITY_SCORE: 71
ADLS_ACUITY_SCORE: 65
ADLS_ACUITY_SCORE: 65
ADLS_ACUITY_SCORE: 71

## 2024-07-06 NOTE — PLAN OF CARE
Goal Outcome Evaluation:      Pt is non verbal VSS, on room air., TF is running at goal rate of 60 ml/hr. PIV is infusing. Incontinent of bowel and bladder, having frequent stools.  External catheter in place. Q2 turns and repo. Has pe tube site, and redness on buttocks, mepilex not o as we are applying after barrier with incontinence.
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
## 2024-07-06 NOTE — PLAN OF CARE
DATE & TIME: 7/5/24 6612-1403    Cognitive Concerns/ Orientation : HECTOR, nonverbal   BEHAVIOR & AGGRESSION TOOL COLOR: Green   ABNL VS/O2: VSS on RA, bradycardic at times  MOBILITY: Assist x2 with lift, fall risk. Reposition q2h.  PAIN MANAGMENT: No signs of pain  DIET: TF infusing at 60ml/hr (goal rate) with 120ml H2O flush q2h x22hr (hold 1hr before/after levothyroxine, 8:00 to 10:00am)  BOWEL/BLADDER: External catheter in place with adequate output. Incontinent of bowel x1 this shift, loose stools  ABNL LAB/BG: Na 127/Phos 2.2 (replaced, recheck in AM)  DRAIN/DEVICES: IV SL  SKIN: Scattered bruising, scabs, abrasions, mepilex in place. Preventative mepilex on heels. Buttocks red.  D/C DATE: Discharge pending Na  OTHER IMPORTANT INFO: GI and Neuro signed off

## 2024-07-06 NOTE — PLAN OF CARE
Goal Outcome Evaluation:  Summary: R hemiplegia, asp pneumonia, hyponatremia  DATE & TIME: 07/6/24 9564-1008    Cognitive Concerns/ Orientation : HECTOR, nonverbal   BEHAVIOR & AGGRESSION TOOL COLOR: Green   ABNL VS/O2: VSS on RA ex bradycardic at times  MOBILITY: A2 w/ cares. Bedrest. T&R  PAIN MANAGMENT: No signs of pain  DIET: TF infusing at 60ml/hr (goal rate) with 120ml free water flushes q2h (hold 1hr before/after levothyroxine, 8:00 to 10:00am)  BOWEL/BLADDER: Incontinent. Primofit in place. Pt had large loose BM this shift. Held morning bowel meds d/t 4 BM since yesterday.  ABNL LAB/BG: Na 127, Cr 0.66  DRAIN/DEVICES: R PIV SL  SKIN: Scattered bruising, scabs, abrasions, mepilex in place. Preventative mepilex on heels. Wound on buttocks - WOC orders in place.  D/C DATE: Discharge pending Na. Home with home health care for RN, PT, and speech  OTHER IMPORTANT INFO: GI and Neuro signed off

## 2024-07-06 NOTE — PLAN OF CARE
Goal Outcome Evaluation:       DATE & TIME: 7/5/24 -7/6/24 3427-0704    Cognitive Concerns/ Orientation : HECTOR, nonverbal   BEHAVIOR & AGGRESSION TOOL COLOR: Green   ABNL VS/O2: VSS on RA, bradycardic at times  MOBILITY: Assist x2 with lift, fall risk. Reposition q2h.  PAIN MANAGMENT: No signs of pain  DIET: TF infusing at 60ml/hr (goal rate) with 120ml H2O flush q2h x22hr (hold 1hr before/after levothyroxine, 8:00 to 10:00am)  BOWEL/BLADDER: External catheter in place with adequate output. Incontinent of bowel x3 this shift, loose stools  ABNL LAB/BG: Na 127/Phos 2.2 (replaced, recheck in AM)  DRAIN/DEVICES: IV SL  SKIN: Scattered bruising, scabs, abrasions, mepilex in place. Preventative mepilex on heels. Buttocks red- using thin layer barrier paste.  D/C DATE: Discharge pending Na  OTHER IMPORTANT INFO: GI and Neuro signed off

## 2024-07-07 LAB
ANION GAP SERPL CALCULATED.3IONS-SCNC: 8 MMOL/L (ref 7–15)
BUN SERPL-MCNC: 10.1 MG/DL (ref 8–23)
CALCIUM SERPL-MCNC: 8.7 MG/DL (ref 8.8–10.2)
CHLORIDE SERPL-SCNC: 91 MMOL/L (ref 98–107)
CREAT SERPL-MCNC: 0.66 MG/DL (ref 0.67–1.17)
DEPRECATED HCO3 PLAS-SCNC: 26 MMOL/L (ref 22–29)
EGFRCR SERPLBLD CKD-EPI 2021: >90 ML/MIN/1.73M2
GLUCOSE SERPL-MCNC: 88 MG/DL (ref 70–99)
OSMOLALITY SERPL: 265 MMOL/KG (ref 280–301)
OSMOLALITY UR: 483 MMOL/KG (ref 100–1200)
PHOSPHATE SERPL-MCNC: 2.4 MG/DL (ref 2.5–4.5)
POTASSIUM SERPL-SCNC: 4.3 MMOL/L (ref 3.4–5.3)
SODIUM SERPL-SCNC: 125 MMOL/L (ref 135–145)
SODIUM UR-SCNC: 134 MMOL/L

## 2024-07-07 PROCEDURE — 84100 ASSAY OF PHOSPHORUS: CPT | Performed by: STUDENT IN AN ORGANIZED HEALTH CARE EDUCATION/TRAINING PROGRAM

## 2024-07-07 PROCEDURE — 36415 COLL VENOUS BLD VENIPUNCTURE: CPT | Performed by: STUDENT IN AN ORGANIZED HEALTH CARE EDUCATION/TRAINING PROGRAM

## 2024-07-07 PROCEDURE — 250N000013 HC RX MED GY IP 250 OP 250 PS 637: Performed by: INTERNAL MEDICINE

## 2024-07-07 PROCEDURE — 120N000001 HC R&B MED SURG/OB

## 2024-07-07 PROCEDURE — 80048 BASIC METABOLIC PNL TOTAL CA: CPT | Performed by: STUDENT IN AN ORGANIZED HEALTH CARE EDUCATION/TRAINING PROGRAM

## 2024-07-07 PROCEDURE — 83935 ASSAY OF URINE OSMOLALITY: CPT | Performed by: STUDENT IN AN ORGANIZED HEALTH CARE EDUCATION/TRAINING PROGRAM

## 2024-07-07 PROCEDURE — 250N000013 HC RX MED GY IP 250 OP 250 PS 637: Performed by: STUDENT IN AN ORGANIZED HEALTH CARE EDUCATION/TRAINING PROGRAM

## 2024-07-07 PROCEDURE — 84300 ASSAY OF URINE SODIUM: CPT | Performed by: STUDENT IN AN ORGANIZED HEALTH CARE EDUCATION/TRAINING PROGRAM

## 2024-07-07 PROCEDURE — 250N000013 HC RX MED GY IP 250 OP 250 PS 637: Performed by: PSYCHIATRY & NEUROLOGY

## 2024-07-07 PROCEDURE — 99232 SBSQ HOSP IP/OBS MODERATE 35: CPT | Performed by: STUDENT IN AN ORGANIZED HEALTH CARE EDUCATION/TRAINING PROGRAM

## 2024-07-07 PROCEDURE — 83930 ASSAY OF BLOOD OSMOLALITY: CPT | Performed by: STUDENT IN AN ORGANIZED HEALTH CARE EDUCATION/TRAINING PROGRAM

## 2024-07-07 RX ADMIN — BRIVARACETAM 100 MG: 10 SOLUTION ORAL at 22:44

## 2024-07-07 RX ADMIN — POTASSIUM & SODIUM PHOSPHATES POWDER PACK 280-160-250 MG 1 PACKET: 280-160-250 PACK at 08:09

## 2024-07-07 RX ADMIN — OXYCODONE HYDROCHLORIDE AND ACETAMINOPHEN 3000 MG: 500 TABLET ORAL at 08:10

## 2024-07-07 RX ADMIN — LEVOTHYROXINE SODIUM 112 MCG: 112 TABLET ORAL at 08:10

## 2024-07-07 RX ADMIN — CYANOCOBALAMIN TAB 1000 MCG 1000 MCG: 1000 TAB at 08:10

## 2024-07-07 RX ADMIN — POTASSIUM & SODIUM PHOSPHATES POWDER PACK 280-160-250 MG 1 PACKET: 280-160-250 PACK at 18:23

## 2024-07-07 RX ADMIN — CITALOPRAM HYDROBROMIDE 10 MG: 10 TABLET ORAL at 08:10

## 2024-07-07 RX ADMIN — AMOXICILLIN AND CLAVULANATE POTASSIUM 1 TABLET: 875; 125 TABLET, FILM COATED ORAL at 20:59

## 2024-07-07 RX ADMIN — HYDROCORTISONE 7.5 MG: 5 TABLET ORAL at 14:03

## 2024-07-07 RX ADMIN — SODIUM BICARBONATE 325 MG: 325 TABLET ORAL at 08:10

## 2024-07-07 RX ADMIN — POTASSIUM & SODIUM PHOSPHATES POWDER PACK 280-160-250 MG 1 PACKET: 280-160-250 PACK at 14:04

## 2024-07-07 RX ADMIN — CARBAMAZEPINE 150 MG: 100 SUSPENSION ORAL at 21:01

## 2024-07-07 RX ADMIN — CARBAMAZEPINE 150 MG: 100 SUSPENSION ORAL at 08:10

## 2024-07-07 RX ADMIN — POTASSIUM & SODIUM PHOSPHATES POWDER PACK 280-160-250 MG 1 PACKET: 280-160-250 PACK at 22:45

## 2024-07-07 RX ADMIN — Medication 15 ML: at 08:12

## 2024-07-07 RX ADMIN — HYDROCORTISONE 15 MG: 10 TABLET ORAL at 08:12

## 2024-07-07 RX ADMIN — BRIVARACETAM 100 MG: 10 SOLUTION ORAL at 09:46

## 2024-07-07 RX ADMIN — AMOXICILLIN AND CLAVULANATE POTASSIUM 1 TABLET: 875; 125 TABLET, FILM COATED ORAL at 08:10

## 2024-07-07 RX ADMIN — Medication 40 MG: at 06:47

## 2024-07-07 RX ADMIN — Medication 100 MCG: at 08:09

## 2024-07-07 ASSESSMENT — ACTIVITIES OF DAILY LIVING (ADL)
ADLS_ACUITY_SCORE: 65
ADLS_ACUITY_SCORE: 67
ADLS_ACUITY_SCORE: 67
ADLS_ACUITY_SCORE: 71
ADLS_ACUITY_SCORE: 67
ADLS_ACUITY_SCORE: 71
ADLS_ACUITY_SCORE: 67
ADLS_ACUITY_SCORE: 65
ADLS_ACUITY_SCORE: 71
ADLS_ACUITY_SCORE: 65
ADLS_ACUITY_SCORE: 67
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
## 2024-07-07 NOTE — PLAN OF CARE
Goal Outcome Evaluation:       Summary: R hemiplegia, asp pneumonia, hyponatremia  DATE & TIME: 07/6/24-7/7/24 1303-7329   Cognitive Concerns/ Orientation : HECTOR, nonverbal   BEHAVIOR & AGGRESSION TOOL COLOR: Green   ABNL VS/O2: VSS on RA ex bradycardic at times  MOBILITY: A2 w/ cares. Bedrest. T&R  PAIN MANAGMENT: No signs of pain  DIET: TF infusing at 60ml/hr (goal rate) with 120ml free water flushes q2h (hold 1hr before/after levothyroxine, 8:00 to 10:00am)  BOWEL/BLADDER: Incontinent. Primofit in place. Pt had 3x loose, soft BM this shift.   ABNL LAB/BG: Na 127, Cr 0.66  DRAIN/DEVICES: L PIV SL  SKIN: Scattered bruising, scabs, abrasions, mepilex in place. Preventative mepilex on heels. Wound on buttocks - WOC orders in place.  D/C DATE: Discharge pending Na. Home with home health care for RN, PT, and speech  OTHER IMPORTANT INFO: GI and Neuro signed off

## 2024-07-07 NOTE — PLAN OF CARE
Goal Outcome Evaluation:  Summary: R hemiplegia, asp pneumonia, hyponatremia  DATE & TIME: 07/7/24 2160-8611   Cognitive Concerns/ Orientation : HECTOR, nonverbal   BEHAVIOR & AGGRESSION TOOL COLOR: Green   ABNL VS/O2: VSS on RA ex bradycardic at times  MOBILITY: A2 w/ cares. Bedrest. T&R  PAIN MANAGMENT: No signs of pain  DIET: TF infusing at 60ml/hr (goal rate) with 125ml free water flushes q4h (hold 1hr before/after levothyroxine, 8:00 to 10:00am)  BOWEL/BLADDER: Incontinent. Primofit in place. Pt had 2 loose BM this shift.  ABNL LAB/BG: Na 125, Cr 0.66  DRAIN/DEVICES: L PIV SL  SKIN: Scattered bruising, scabs, abrasions, mepilex in place. Preventative mepilex on heels. Wound on buttocks - WOC orders in place  D/C DATE: Discharge pending Na. Home with home health care for RN, PT, and speech  OTHER IMPORTANT INFO: GI and Neuro signed off. Seizure precautions. Nephrology consult pending.
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
## 2024-07-08 ENCOUNTER — MEDICAL CORRESPONDENCE (OUTPATIENT)
Dept: HEALTH INFORMATION MANAGEMENT | Facility: CLINIC | Age: 64
End: 2024-07-08
Payer: MEDICARE

## 2024-07-08 LAB
ANION GAP SERPL CALCULATED.3IONS-SCNC: 11 MMOL/L (ref 7–15)
BUN SERPL-MCNC: 10.6 MG/DL (ref 8–23)
CALCIUM SERPL-MCNC: 8.8 MG/DL (ref 8.8–10.2)
CHLORIDE SERPL-SCNC: 91 MMOL/L (ref 98–107)
CREAT SERPL-MCNC: 0.66 MG/DL (ref 0.67–1.17)
DEPRECATED HCO3 PLAS-SCNC: 25 MMOL/L (ref 22–29)
EGFRCR SERPLBLD CKD-EPI 2021: >90 ML/MIN/1.73M2
GLUCOSE SERPL-MCNC: 111 MG/DL (ref 70–99)
MAGNESIUM SERPL-MCNC: 2 MG/DL (ref 1.7–2.3)
PATH REPORT.COMMENTS IMP SPEC: NORMAL
PATH REPORT.FINAL DX SPEC: NORMAL
PATH REPORT.GROSS SPEC: NORMAL
PATH REPORT.MICROSCOPIC SPEC OTHER STN: NORMAL
PATH REPORT.RELEVANT HX SPEC: NORMAL
PHOSPHATE SERPL-MCNC: 3.2 MG/DL (ref 2.5–4.5)
POTASSIUM SERPL-SCNC: 4.6 MMOL/L (ref 3.4–5.3)
SODIUM SERPL-SCNC: 127 MMOL/L (ref 135–145)

## 2024-07-08 PROCEDURE — 80048 BASIC METABOLIC PNL TOTAL CA: CPT | Performed by: STUDENT IN AN ORGANIZED HEALTH CARE EDUCATION/TRAINING PROGRAM

## 2024-07-08 PROCEDURE — 120N000001 HC R&B MED SURG/OB

## 2024-07-08 PROCEDURE — 250N000013 HC RX MED GY IP 250 OP 250 PS 637: Performed by: PSYCHIATRY & NEUROLOGY

## 2024-07-08 PROCEDURE — 36415 COLL VENOUS BLD VENIPUNCTURE: CPT | Performed by: STUDENT IN AN ORGANIZED HEALTH CARE EDUCATION/TRAINING PROGRAM

## 2024-07-08 PROCEDURE — 84100 ASSAY OF PHOSPHORUS: CPT | Performed by: STUDENT IN AN ORGANIZED HEALTH CARE EDUCATION/TRAINING PROGRAM

## 2024-07-08 PROCEDURE — 83735 ASSAY OF MAGNESIUM: CPT | Performed by: STUDENT IN AN ORGANIZED HEALTH CARE EDUCATION/TRAINING PROGRAM

## 2024-07-08 PROCEDURE — 99233 SBSQ HOSP IP/OBS HIGH 50: CPT | Performed by: INTERNAL MEDICINE

## 2024-07-08 PROCEDURE — 250N000013 HC RX MED GY IP 250 OP 250 PS 637: Performed by: INTERNAL MEDICINE

## 2024-07-08 PROCEDURE — 250N000013 HC RX MED GY IP 250 OP 250 PS 637: Performed by: STUDENT IN AN ORGANIZED HEALTH CARE EDUCATION/TRAINING PROGRAM

## 2024-07-08 RX ADMIN — CITALOPRAM HYDROBROMIDE 10 MG: 10 TABLET ORAL at 08:53

## 2024-07-08 RX ADMIN — HYDROCORTISONE 15 MG: 10 TABLET ORAL at 08:56

## 2024-07-08 RX ADMIN — SODIUM BICARBONATE 325 MG: 325 TABLET ORAL at 08:51

## 2024-07-08 RX ADMIN — BRIVARACETAM 100 MG: 10 SOLUTION ORAL at 22:10

## 2024-07-08 RX ADMIN — Medication 100 MCG: at 08:53

## 2024-07-08 RX ADMIN — Medication 15 ML: at 08:54

## 2024-07-08 RX ADMIN — OXYCODONE HYDROCHLORIDE AND ACETAMINOPHEN 3000 MG: 500 TABLET ORAL at 08:52

## 2024-07-08 RX ADMIN — LEVOTHYROXINE SODIUM 112 MCG: 112 TABLET ORAL at 08:53

## 2024-07-08 RX ADMIN — BRIVARACETAM 100 MG: 10 SOLUTION ORAL at 08:51

## 2024-07-08 RX ADMIN — AMOXICILLIN AND CLAVULANATE POTASSIUM 1 TABLET: 875; 125 TABLET, FILM COATED ORAL at 08:53

## 2024-07-08 RX ADMIN — CARBAMAZEPINE 150 MG: 100 SUSPENSION ORAL at 22:10

## 2024-07-08 RX ADMIN — POTASSIUM & SODIUM PHOSPHATES POWDER PACK 280-160-250 MG 1 PACKET: 280-160-250 PACK at 08:52

## 2024-07-08 RX ADMIN — Medication 40 MG: at 08:53

## 2024-07-08 RX ADMIN — CARBAMAZEPINE 150 MG: 100 SUSPENSION ORAL at 08:57

## 2024-07-08 RX ADMIN — CYANOCOBALAMIN TAB 1000 MCG 1000 MCG: 1000 TAB at 08:52

## 2024-07-08 RX ADMIN — HYDROCORTISONE 7.5 MG: 5 TABLET ORAL at 14:06

## 2024-07-08 ASSESSMENT — ACTIVITIES OF DAILY LIVING (ADL)
ADLS_ACUITY_SCORE: 69
ADLS_ACUITY_SCORE: 67
ADLS_ACUITY_SCORE: 67
ADLS_ACUITY_SCORE: 69
ADLS_ACUITY_SCORE: 69
ADLS_ACUITY_SCORE: 70
ADLS_ACUITY_SCORE: 67
ADLS_ACUITY_SCORE: 69
ADLS_ACUITY_SCORE: 70
ADLS_ACUITY_SCORE: 69
ADLS_ACUITY_SCORE: 67
ADLS_ACUITY_SCORE: 67
ADLS_ACUITY_SCORE: 69
ADLS_ACUITY_SCORE: 67
ADLS_ACUITY_SCORE: 70
ADLS_ACUITY_SCORE: 67
ADLS_ACUITY_SCORE: 67
ADLS_ACUITY_SCORE: 69
ADLS_ACUITY_SCORE: 70
ADLS_ACUITY_SCORE: 69
ADLS_ACUITY_SCORE: 69
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
## 2024-07-08 NOTE — PLAN OF CARE
[id number]: No acute events overnight. Pt awake most of night - calm and cooperative, appeared comfortable.     Summary: R hemiplegia, asp pneumonia, hyponatremia  Cognitive Concerns/ Orientation : HECTOR, nonverbal. Alert, interacting with staff, looks and smiles when his name is called.  BEHAVIOR & AGGRESSION TOOL COLOR: Green   ABNL VS/O2: VSS on RA ex bradycardic at times (high 50s)  MOBILITY: A2 w/ cares. Bedrest, W/C baseline. T&R q2h  PAIN MANAGMENT: No signs of pain  DIET: TF infusing at 60ml/hr (goal rate) with 125ml FWF q4h   BOWEL/BLADDER: Incontinent. External cath in place. No BMs overnight  ABNL LAB/BG: Na 125 (AM recheck pending), Cr 0.66, phos 2.4 (replaced, AM recheck pending)  DRAIN/DEVICES: L PIV SL  SKIN: Scattered bruising, scabs, scattered. Preventative mepilex on heels. Wound on buttocks - WOC orders in place, barrier cream applied x2.  D/C DATE: Discharge pending Na. Home with home health care for RN, PT, and speech  OTHER IMPORTANT INFO: GI and Neuro signed off. Possible nephrology consult if Na not improving by 7/8.

## 2024-07-08 NOTE — PLAN OF CARE
Summary: R hemiplegia, asp pneumonia, hyponatremia  DATE & TIME: 07/7/24 0089-2476   Cognitive Concerns/ Orientation : HECTOR, nonverbal. Alert today, interacting with staff.  BEHAVIOR & AGGRESSION TOOL COLOR: Green   ABNL VS/O2: VSS on RA ex bradycardic at times  MOBILITY: A2 w/ cares. Bedrest. T&R q2h  PAIN MANAGMENT: No signs of pain  DIET: TF infusing at 60ml/hr (goal rate) with 125ml FWF q4h   BOWEL/BLADDER: Incontinent. External cath in place, changed x2 this shift due to stool incontinence and hole in device. Pt had 4 loose BM this shift- senna held.  ABNL LAB/BG: Na 125 (to be rechecked in AM), Cr 0.66, phos 2.4 (replaced, recheck in AM)  DRAIN/DEVICES: L PIV SL  SKIN: Scattered bruising, scabs, scattered. Preventative mepilex on heels. Wound on buttocks - WOC orders in place, barrier cream applied x2.  D/C DATE: Discharge pending Na. Home with home health care for RN, PT, and speech  OTHER IMPORTANT INFO: GI and Neuro signed off. Nephrology consult if Na not improving by 7/8.

## 2024-07-09 LAB
ANION GAP SERPL CALCULATED.3IONS-SCNC: 6 MMOL/L (ref 7–15)
BUN SERPL-MCNC: 11.4 MG/DL (ref 8–23)
CALCIUM SERPL-MCNC: 8.4 MG/DL (ref 8.8–10.2)
CHLORIDE SERPL-SCNC: 90 MMOL/L (ref 98–107)
CREAT SERPL-MCNC: 0.65 MG/DL (ref 0.67–1.17)
DEPRECATED HCO3 PLAS-SCNC: 28 MMOL/L (ref 22–29)
EGFRCR SERPLBLD CKD-EPI 2021: >90 ML/MIN/1.73M2
GLUCOSE BLDC GLUCOMTR-MCNC: 141 MG/DL (ref 70–99)
GLUCOSE SERPL-MCNC: 127 MG/DL (ref 70–99)
PHOSPHATE SERPL-MCNC: 2.1 MG/DL (ref 2.5–4.5)
POTASSIUM SERPL-SCNC: 4.4 MMOL/L (ref 3.4–5.3)
SODIUM SERPL-SCNC: 123 MMOL/L (ref 135–145)
SODIUM SERPL-SCNC: 124 MMOL/L (ref 135–145)

## 2024-07-09 PROCEDURE — 84295 ASSAY OF SERUM SODIUM: CPT | Performed by: INTERNAL MEDICINE

## 2024-07-09 PROCEDURE — 120N000001 HC R&B MED SURG/OB

## 2024-07-09 PROCEDURE — 99232 SBSQ HOSP IP/OBS MODERATE 35: CPT | Performed by: INTERNAL MEDICINE

## 2024-07-09 PROCEDURE — 84100 ASSAY OF PHOSPHORUS: CPT | Performed by: INTERNAL MEDICINE

## 2024-07-09 PROCEDURE — 250N000013 HC RX MED GY IP 250 OP 250 PS 637: Performed by: INTERNAL MEDICINE

## 2024-07-09 PROCEDURE — 99222 1ST HOSP IP/OBS MODERATE 55: CPT | Mod: 25 | Performed by: INTERNAL MEDICINE

## 2024-07-09 PROCEDURE — 36415 COLL VENOUS BLD VENIPUNCTURE: CPT | Performed by: INTERNAL MEDICINE

## 2024-07-09 PROCEDURE — 80048 BASIC METABOLIC PNL TOTAL CA: CPT | Performed by: INTERNAL MEDICINE

## 2024-07-09 PROCEDURE — 250N000013 HC RX MED GY IP 250 OP 250 PS 637: Performed by: PSYCHIATRY & NEUROLOGY

## 2024-07-09 RX ADMIN — LEVOTHYROXINE SODIUM 112 MCG: 112 TABLET ORAL at 09:42

## 2024-07-09 RX ADMIN — BRIVARACETAM 100 MG: 10 SOLUTION ORAL at 09:53

## 2024-07-09 RX ADMIN — POTASSIUM & SODIUM PHOSPHATES POWDER PACK 280-160-250 MG 1 PACKET: 280-160-250 PACK at 09:41

## 2024-07-09 RX ADMIN — CARBAMAZEPINE 150 MG: 100 SUSPENSION ORAL at 09:44

## 2024-07-09 RX ADMIN — CARBAMAZEPINE 150 MG: 100 SUSPENSION ORAL at 21:56

## 2024-07-09 RX ADMIN — OXYCODONE HYDROCHLORIDE AND ACETAMINOPHEN 3000 MG: 500 TABLET ORAL at 09:41

## 2024-07-09 RX ADMIN — SODIUM BICARBONATE 325 MG: 325 TABLET ORAL at 09:42

## 2024-07-09 RX ADMIN — HYDROCORTISONE 15 MG: 10 TABLET ORAL at 09:42

## 2024-07-09 RX ADMIN — CYANOCOBALAMIN TAB 1000 MCG 1000 MCG: 1000 TAB at 09:42

## 2024-07-09 RX ADMIN — Medication 15 G: at 21:55

## 2024-07-09 RX ADMIN — Medication 40 MG: at 09:44

## 2024-07-09 RX ADMIN — CITALOPRAM HYDROBROMIDE 10 MG: 10 TABLET ORAL at 09:42

## 2024-07-09 RX ADMIN — HYDROCORTISONE 7.5 MG: 5 TABLET ORAL at 15:39

## 2024-07-09 RX ADMIN — Medication 100 MCG: at 09:42

## 2024-07-09 RX ADMIN — Medication 15 ML: at 09:43

## 2024-07-09 RX ADMIN — Medication 15 G: at 17:35

## 2024-07-09 RX ADMIN — BRIVARACETAM 100 MG: 10 SOLUTION ORAL at 21:56

## 2024-07-09 ASSESSMENT — ACTIVITIES OF DAILY LIVING (ADL)
ADLS_ACUITY_SCORE: 69

## 2024-07-09 NOTE — PROGRESS NOTES
Shift: [id number] 7/8/2024  Summary: R hemiplegia, asp pneumonia, hyponatremia    Orientation: HECTOR; Non Verbal; Responds to name   Vitals/Tele: VSS RA except Bradycardic at times  BEHAVIOR & AGGRESSION TOOL COLOR: Green             PAIN MANAGMENT: No signs of pain  IV Access/drains: PIV SL  Diet: TF infusing at 60ml/hr (goal rate) with 125ml FWF q2h (hold 1hr before/after levothyroxine, 8:00 to 10:00am)  Mobility: A2 w/ cares. Bedrest. T&R  GI/: Purewick in place, good output; Incontinent of stool ; no BM this shift   Wound/Skin: Scattered bruising, scabs, abrasions, mepilex in place. Preventative mepilex on heels. Wound on buttocks - WOC orders in place  Consults: None this shift; GI and Neuro signed off. Seizure precautions. Nephrology consult pending 07/09 Na lab.  Discharge Plan: Discharge pending Na. Home with home health care for RN, PT, and speech      See Flow sheets for assessment
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
## 2024-07-09 NOTE — PLAN OF CARE
Goal Outcome Evaluation:  Summary: R hemiplegia, asp pneumonia, hyponatremia  DATE & TIME: 07/9/24 2693-3149       Cognitive Concerns/ Orientation : HECTOR, nonverbal   BEHAVIOR & AGGRESSION TOOL COLOR: Green             ABNL VS/O2: VSS on RA ex bradycardic at times. Pt requiring oral suctioning this afternoon.  MOBILITY: A2 w/ cares. Bedrest. T&R  PAIN MANAGMENT: No signs of pain  DIET: TF infusing at 60ml/hr (goal rate) with 125ml FWF q2h (hold 1hr before/after levothyroxine, 8:00 to 10:00am)  BOWEL/BLADDER: Incontinent. Primofit in place. BM x2 this shift.  ABNL LAB/BG: Na (q8hr) 124, Cr 0.66  DRAIN/DEVICES: L PIV SL  SKIN: Scattered bruising, scabs, abrasions, mepilex in place. Preventative mepilex on heels. Wound on buttocks - WOC orders in place  D/C DATE: Discharge pending Na. Home with home health care for RN, PT, and speech  OTHER IMPORTANT INFO: Seizure precautions. Nephrology following. Urea BID for low Na.

## 2024-07-09 NOTE — PLAN OF CARE
Shift: 07/08-07/09/24 Night shift  Summary: R hemiplegia, asp pneumonia, hyponatremia;   Orientation: HECTOR; Non Verbal; Responds to name   Vitals/Tele: VSS RA except Bradycardic at times  BEHAVIOR & AGGRESSION TOOL COLOR: Green             PAIN MANAGMENT: No signs of pain  IV Access/drains: PIV SL  Diet: TF infusing at 60ml/hr (goal rate) with 125ml FWF q4h (hold 1hr before/after levothyroxine, 8:00 to 10:00am)  Mobility: A2 w/ cares. Bedrest. T&R  GI/: External catheter in place, good output; Incontinent of stool ; no BM this shift   Wound/Skin: Scattered bruising, scabs, abrasions, mepilex in place. Preventative mepilex on heels. Wound on buttocks - WOC orders in place  Consults: GI and Neuro signed off. Seizure precautions. Nephrology consult pending 07/09 Na lab (127- usually 140)  Discharge Plan: Discharge pending Na. Home with home health care for RN, PT, and speech

## 2024-07-09 NOTE — PLAN OF CARE
Goal Outcome Evaluation:  Summary: R hemiplegia, asp pneumonia, hyponatremia  DATE & TIME: 07/8/24 [id number]       Cognitive Concerns/ Orientation : HECTOR, nonverbal   BEHAVIOR & AGGRESSION TOOL COLOR: Green             ABNL VS/O2: VSS on RA ex bradycardic at times  MOBILITY: A2 w/ cares. Bedrest. T&R  PAIN MANAGMENT: No signs of pain  DIET: TF infusing at 60ml/hr (goal rate) with 125ml FWF q2h (hold 1hr before/after levothyroxine, 8:00 to 10:00am)  BOWEL/BLADDER: Incontinent. Primofit in place. BM x2 this shift.  ABNL LAB/BG: Na 127, Cr 0.66  DRAIN/DEVICES: L PIV SL  SKIN: Scattered bruising, scabs, abrasions, mepilex in place. Preventative mepilex on heels. Wound on buttocks - WOC orders in place  D/C DATE: Discharge pending Na. Home with home health care for RN, PT, and speech  OTHER IMPORTANT INFO: GI and Neuro signed off. Seizure precautions. Nephrology consult pending 07/09 Na lab.

## 2024-07-10 LAB
ANION GAP SERPL CALCULATED.3IONS-SCNC: 6 MMOL/L (ref 7–15)
BUN SERPL-MCNC: 33.8 MG/DL (ref 8–23)
CALCIUM SERPL-MCNC: 8.5 MG/DL (ref 8.8–10.2)
CHLORIDE SERPL-SCNC: 93 MMOL/L (ref 98–107)
CREAT SERPL-MCNC: 0.69 MG/DL (ref 0.67–1.17)
DEPRECATED HCO3 PLAS-SCNC: 27 MMOL/L (ref 22–29)
EGFRCR SERPLBLD CKD-EPI 2021: >90 ML/MIN/1.73M2
GLUCOSE SERPL-MCNC: 117 MG/DL (ref 70–99)
POTASSIUM SERPL-SCNC: 4.3 MMOL/L (ref 3.4–5.3)
SODIUM SERPL-SCNC: 126 MMOL/L (ref 135–145)
SODIUM SERPL-SCNC: 126 MMOL/L (ref 135–145)

## 2024-07-10 PROCEDURE — 99232 SBSQ HOSP IP/OBS MODERATE 35: CPT | Performed by: INTERNAL MEDICINE

## 2024-07-10 PROCEDURE — 250N000013 HC RX MED GY IP 250 OP 250 PS 637: Performed by: INTERNAL MEDICINE

## 2024-07-10 PROCEDURE — 99232 SBSQ HOSP IP/OBS MODERATE 35: CPT | Mod: 25 | Performed by: INTERNAL MEDICINE

## 2024-07-10 PROCEDURE — 84295 ASSAY OF SERUM SODIUM: CPT | Performed by: INTERNAL MEDICINE

## 2024-07-10 PROCEDURE — 250N000009 HC RX 250: Performed by: INTERNAL MEDICINE

## 2024-07-10 PROCEDURE — 250N000013 HC RX MED GY IP 250 OP 250 PS 637: Performed by: PSYCHIATRY & NEUROLOGY

## 2024-07-10 PROCEDURE — 258N000003 HC RX IP 258 OP 636: Performed by: INTERNAL MEDICINE

## 2024-07-10 PROCEDURE — 36415 COLL VENOUS BLD VENIPUNCTURE: CPT | Performed by: INTERNAL MEDICINE

## 2024-07-10 PROCEDURE — 120N000001 HC R&B MED SURG/OB

## 2024-07-10 RX ADMIN — HYDROCORTISONE 7.5 MG: 5 TABLET ORAL at 14:46

## 2024-07-10 RX ADMIN — CYANOCOBALAMIN TAB 1000 MCG 1000 MCG: 1000 TAB at 09:28

## 2024-07-10 RX ADMIN — OXYCODONE HYDROCHLORIDE AND ACETAMINOPHEN 3000 MG: 500 TABLET ORAL at 09:28

## 2024-07-10 RX ADMIN — Medication 100 MCG: at 09:28

## 2024-07-10 RX ADMIN — Medication 15 G: at 22:10

## 2024-07-10 RX ADMIN — LEVOTHYROXINE SODIUM 112 MCG: 112 TABLET ORAL at 09:29

## 2024-07-10 RX ADMIN — CITALOPRAM HYDROBROMIDE 10 MG: 10 TABLET ORAL at 09:28

## 2024-07-10 RX ADMIN — Medication 15 ML: at 09:29

## 2024-07-10 RX ADMIN — Medication 60 ML: at 14:46

## 2024-07-10 RX ADMIN — Medication 15 G: at 09:29

## 2024-07-10 RX ADMIN — Medication 40 MG: at 09:27

## 2024-07-10 RX ADMIN — SODIUM PHOSPHATE, MONOBASIC, MONOHYDRATE AND SODIUM PHOSPHATE, DIBASIC, ANHYDROUS 9 MMOL: 142; 276 INJECTION, SOLUTION INTRAVENOUS at 02:47

## 2024-07-10 RX ADMIN — CARBAMAZEPINE 150 MG: 100 SUSPENSION ORAL at 09:28

## 2024-07-10 RX ADMIN — HYDROCORTISONE 15 MG: 10 TABLET ORAL at 09:29

## 2024-07-10 RX ADMIN — BRIVARACETAM 100 MG: 10 SOLUTION ORAL at 22:09

## 2024-07-10 RX ADMIN — POTASSIUM & SODIUM PHOSPHATES POWDER PACK 280-160-250 MG 1 PACKET: 280-160-250 PACK at 09:28

## 2024-07-10 RX ADMIN — CARBAMAZEPINE 150 MG: 100 SUSPENSION ORAL at 22:09

## 2024-07-10 RX ADMIN — BRIVARACETAM 100 MG: 10 SOLUTION ORAL at 09:28

## 2024-07-10 ASSESSMENT — ACTIVITIES OF DAILY LIVING (ADL)
ADLS_ACUITY_SCORE: 69
ADLS_ACUITY_SCORE: 65
ADLS_ACUITY_SCORE: 69
ADLS_ACUITY_SCORE: 65
ADLS_ACUITY_SCORE: 69

## 2024-07-10 NOTE — PLAN OF CARE
Summary: R hemiplegia, asp pneumonia, hyponatremia  DATE & TIME: 07/9-07/10/24 Night shift    Cognitive Concerns/ Orientation : HECTOR, nonverbal   BEHAVIOR & AGGRESSION TOOL COLOR: Green             ABNL VS/O2: VSS on RA ex bradycardic at times  MOBILITY: A2 w/ cares. Bedrest. T&R  PAIN MANAGMENT: No signs of pain  DIET: TF infusing at 60ml/hr (goal rate) with 120ml FWF q4h (hold 1hr before/after levothyroxine, 8:00 to 10:00am)  BOWEL/BLADDER: Incontinent; no BM this shift  ABNL LAB/BG:  Last Na (q8hr) 123; Phos 2.1--> being replaced and recheck tomorrow AM  DRAIN/DEVICES:New IV placed for IV phosphorus replacement   SKIN: Scattered bruising, scabs, abrasions; preventative mepilex on heels. Wound/redness on buttocks - open to air  D/C DATE: Discharge pending Na; Nephrology consulted. Home with home health care for RN, PT, and speech  OTHER IMPORTANT INFO: Seizure precautions. Urea BID for low Na.

## 2024-07-10 NOTE — PROGRESS NOTES
Orientation: A/O x self    Vitals/Tele: VSS on RA    IV Access/drains: L PIV SL    Diet: On feeding tube    Mobility: A x2 lift  GI/: Incontinent of B/B    Wound/Skin: Scattered bruising    Consults: Nephrology following    Discharge Plan: TBD      See Flow sheets for assessment

## 2024-07-10 NOTE — PROGRESS NOTES
DATE & TIME: 8679-2489 7/10/2024  Cognitive Concerns/ Orientation : AO to self, hard to assess pt nonverbal   BEHAVIOR & AGGRESSION TOOL COLOR: green  ABNL VS/O2: VSS on RA, bradycardic at times  MOBILITY: wheelchair bound at baseline, bedrest , T&R q2h  PAIN MANAGMENT: no signs of pain  DIET: TF infusing @60 ml/hr with 120 ml FWF q4h - hold 0607-2146 for levothyroxine  BOWEL/BLADDER: incontinent , BM x1 this shift (diarrhea)  ABNL LAB/BG: Na 126 this AM  DRAIN/DEVICES: L PIV SL  SKIN: scattered bruising and scabs, dry flaky skin peeling on bilateral feet, red and blanchable on buttocks - open to air   D/C DATE: TBD Na

## 2024-07-10 NOTE — CONSULTS
SPIRITUAL HEALTH SERVICES  SPIRITUAL ASSESSMENT Consult Note  [facility]. [address] medical specialties     REFERRAL SOURCE: Introductory visit, assessment in light of numerous spiritual care visits in past hospitalizations.    Keyon is awake and visually acknowledged me, no nods, thumbs up or indication of response to my introduction.  In consultation with RN I attempted follow up as it was expected that lizbeth Nuñez may visit; no family present on multiple return attempts today.    Plan: I will follow up tomorrow with patient or family for possible assessment of spiritual needs.    Ruby Abad MDiv [facility]  Staff   Please place consult order for routine Spiritual Health Services referrals.  [facility]S available 24/7 for emergent requests either by having the on-call  paged or by entering an ASAP/STAT consult in Epic (this will also page the on-call ).

## 2024-07-10 NOTE — PROGRESS NOTES
CLINICAL NUTRITION SERVICES - REASSESSMENT NOTE    Recommendations by Registered Dietitian (RD):   Adding Expedite Bottle to TF d/t possible new stage 2 PI ~  Jevity 1.5 @ 60 mL/hr x 22 hours (hold for 1 hr before/after levothyroxine dose)  + 1 Bottle Expedite = 100 kcal, 10g protein  Total Enteral Provisions: 2080 kcals (29 kcal/kg), 95 g PRO (1.3 g/kg), 1003 ml free H2O, and 28 g fiber daily.  Free Water Flush: 125 mL q 4 hours for tube patency (750 mL daily)  Total Free Water Provisions: 1753 mL daily    Noted nephrology following -- deferring sodium/fluid decisions to them  Ure-na added BID 7/9   Malnutrition:  % Weight Loss:  None noted  % Intake:  No decreased intake noted (on TF)  Subcutaneous Fat Loss:  Chronically low baseline stores  Muscle Loss:  Chronically low baseline stores  Fluid Retention: None noted     Malnutrition Diagnosis: Patient does not meet two of the above criteria necessary for diagnosing malnutrition     EVALUATION OF PROGRESS TOWARD GOALS   Diet:  NPO    Nutrition Support:    - TF continues at goal rate as follows ~  Enteral Regimen: Jevity 1.5 @ 60 mL/hr x 22 hours (hold for 1 hr before/after levothyroxine dose)  Total Enteral Provisions: 1980 kcals (28 kcal/kg), 85 g PRO (1.2 g/kg), 1003 ml free H2O, and 28 g fiber daily.  Free Water Flush: 125 mL q 4 hours for tube patency (750 mL daily)  Total Free Water Provisions: 1753 mL daily    ASSESSED NUTRITION NEEDS:  Dosing Weight 71.5 kg (7/1)  Estimated Energy Needs: 2211-5607 kcals (25-30 Kcal/Kg)  Justification: maintenance  Estimated Protein Needs:  grams protein (1.2-1.5 g pro/Kg)  Justification: wound healing    NEW FINDINGS:   Skin: WOC following (7/5) ~  Wound location: Sacrum   Wound due to: Friction, Shear, and Incontinence Associated Dermatitis (IAD) and possible Stage 2 PI   STATUS: initial assessment     Labs:   Na 127 > 125 > 127 > 124 > 123 > 126  BUN 33.8 (H)    Meds:   Vitamin B12, levothyroxine, liquid MVI/M, ure-na  BID, vitamin C, vitamin D3    Dispo:   Awaiting Na improvement    Weight: stable, suspect shifts d/t fluid status changes  07/07/24 0638 69.2 kg (152 lb 8.9 oz) Bed scale   07/06/24 0446 70 kg (154 lb 5.2 oz) Bed scale   07/05/24 0541 71.2 kg (156 lb 15.5 oz) Bed scale   07/04/24 0600 67.5 kg (148 lb 13 oz) Bed scale   07/03/24 0550 71.8 kg (158 lb 4.6 oz) Bed scale     Previous Goals:   EN - tolerate goal feeds and meet % needs.   Evaluation: Met    Previous Nutrition Diagnosis:   Inadequate protein-energy intake related to NPO status as evidenced by need EN support to meet nutrition needs.   Evaluation: No change    CURRENT NUTRITION DIAGNOSIS  Inadequate oral intake related to NPO status as evidenced by need for EN support to meet nutritional needs    INTERVENTIONS  Recommendations / Nutrition Prescription  See above    Implementation  EN - added expedite, see orders above  Multivitamin/Mineral - continue    Goals  EN to meet % estimated needs at goal    MONITORING AND EVALUATION:  Progress towards goals will be monitored and evaluated per protocol and Practice Guidelines    Sun Abad RD, LD  Clinical Dietitian - [facility]

## 2024-07-10 NOTE — PROGRESS NOTES
Nephrology Progress Note  07/10/2024         Assessment & Recommendations:   1 hyponatremia-euvolemic hyponatremia with high urinary sodium and osmolality consistent with SIADH.   Celexa and carbamazepine can both cause SIADH  SIADH is a diagnosis of exclusion and patient does have history of adrenal insufficiency/panhypopituitarism, hypothyroidism but these issues seem to be treated well at the moment.  Normal blood pressure, potassium, EF    Sodium improving with cutting back on free water flushes.     Other issues-  Recurrent aspiration pneumonia  PEG tube feeding  Seizure disorder  TBI     Recommendation-  -continue with current plan with reduced free water flushes and Urena.  Daily BMP check.  -Continue levothyroxine, hydrocortisone  -Explore alternative for Celexa    Recommendations were communicated to primary team     Rigoberto Mcfarlane MD  [facility] - Nephrology   [phone]      Interval History :   Seen / examined.   Status quo.  No acute issues overnight.  Sodium is improving.      Physical Exam:   I/O last 3 completed shifts:  In: 2884 [NG/GT:2884]  Out: 400 [Urine:400]    GENERAL APPEARANCE: NAD  CV: regular rhythm, normal rate, no rub   - JVP -   - Edema-  GI: soft, nontender,   MS: no evidence of inflammation in joints  : no aaron  SKIN: no rash, warm, dry, no cyanosis  NEURO: face symmetric,     Labs:   All labs reviewed by me  Electrolytes/Renal -   Recent Labs   Lab Test 07/10/24  0549 07/09/24  2156 07/09/24  1745 07/09/24  1312 07/08/24  1045 07/07/24  1002 07/06/24  1217 07/05/24  1324 07/05/24  0812 07/05/24  0317   *  126*  --  123* 124* 127* 125*   < > 127*  --  129*   POTASSIUM 4.3  --   --  4.4 4.6 4.3   < > 4.3  --  4.2   CHLORIDE 93*  --   --  90* 91* 91*   < >  --   --  95*   CO2 27  --   --  28 25 26   < >  --   --  25   BUN 33.8*  --   --  11.4 10.6 10.1   < >  --   --  7.3*   CR 0.69  --   --  0.65* 0.66* 0.66*   < >  --   --  0.81   * 141*  --  127* 111* 88    < >  --    < > 88   STEVE 8.5*  --   --  8.4* 8.8 8.7*   < >  --   --  8.5*   MAG  --   --   --   --  2.0  --   --  2.1  --  2.2   PHOS  --   --  2.1*  --  3.2 2.4*   < > 2.2*  --  2.4*    < > = values in this interval not displayed.       CBC -   Recent Labs   Lab Test 07/01/24  1049 06/30/24 2022 05/03/24 1319 05/02/24 0812   WBC 8.2 13.7*  --  6.3   HGB 13.4 14.3  --  12.8*    208 171 167       LFTs -   Recent Labs   Lab Test 06/30/24 2022 04/27/24 0812 04/17/24 2010   ALKPHOS 100 87 100   BILITOTAL 0.2 0.4 0.3   ALT 20 24 22   AST 23 28 27   PROTTOTAL 7.5 8.5* 8.6*   ALBUMIN 3.8 4.0 4.2       Iron Panel -   Recent Labs   Lab Test 06/20/22  0532   TRIP 344           Current Medications:  Current Facility-Administered Medications   Medication Dose Route Frequency Provider Last Rate Last Admin    Brivaracetam (BRIVIACT) solution 100 mg  100 mg Oral or Feeding Tube BID Cele Maldonado MD   100 mg at 07/10/24 0928    carBAMazepine (TEGretol) suspension 150 mg  150 mg Oral or Feeding Tube BID Soumya León MD   150 mg at 07/10/24 0928    citalopram (celeXA) tablet 10 mg  10 mg Oral or Feeding Tube Daily Cele Maldonado MD   10 mg at 07/10/24 0928    cyanocobalamin (VITAMIN B-12) tablet 1,000 mcg  1,000 mcg Per Feeding Tube Daily Cele Maldonado MD   1,000 mcg at 07/10/24 0928    hydrocortisone (CORTEF) half-tab 7.5 mg  7.5 mg Oral or Feeding Tube Daily Cele Maldonado MD   7.5 mg at 07/09/24 1539    hydrocortisone (CORTEF) tablet 15 mg  15 mg Oral or Feeding Tube QAM Cele Maldonado MD   15 mg at 07/10/24 0929    levothyroxine (SYNTHROID/LEVOTHROID) tablet 112 mcg  112 mcg Oral or Feeding Tube Daily Cele Maldonado MD   112 mcg at 07/10/24 0929    multivitamins w/minerals liquid 15 mL  15 mL Oral or Feeding Tube Daily Cele Maldonado MD   15 mL at 07/10/24 0929    pantoprazole (PROTONIX) 2 mg/mL suspension 40 mg  40 mg Per Feeding Tube QAM AC Cele Maldonado,  MD   40 mg at 07/10/24 0927    polyethylene glycol (MIRALAX) Packet 17 g  17 g Oral or Feeding Tube Daily Cele Maldonado MD   17 g at 07/05/24 0955    potassium & sodium phosphates (NEUTRA-PHOS) Packet 1 packet  1 packet Oral or Feeding Tube Daily Cele Maldonado MD   1 packet at 07/10/24 0928    sennosides (SENOKOT) tablet 8.6 mg  8.6 mg Oral BID Digna Rhodes MD   8.6 mg at 07/05/24 0955    [Held by provider] sodium bicarbonate tablet 325 mg  325 mg Per J Tube Daily Cele Maldonado MD   325 mg at 07/09/24 0942    sodium chloride (PF) 0.9% PF flush 3 mL  3 mL Intracatheter Q8H Cele Maldonado MD   3 mL at 07/10/24 1130    Urea (URE-NA) packet 15 g  15 g Per Feeding Tube BID Starr Champion MD   15 g at 07/10/24 0929    vitamin C (ASCORBIC ACID) tablet 3,000 mg  3,000 mg Oral or Feeding Tube Daily Cele Maldonado MD   3,000 mg at 07/10/24 0928    vitamin D3 (CHOLECALCIFEROL) tablet 100 mcg  100 mcg Per Feeding Tube Daily Cele Maldonaod MD   100 mcg at 07/10/24 0928    wound support modular (EXPEDITE) bottle 60 mL  60 mL Per Feeding Tube Daily Cele Maldonado MD         Current Facility-Administered Medications   Medication Dose Route Frequency Provider Last Rate Last Admin    dextrose 10% infusion   Intravenous Continuous PRN Joanna Brunson MD Jiwan Thapa, MD

## 2024-07-11 LAB
ANION GAP SERPL CALCULATED.3IONS-SCNC: 7 MMOL/L (ref 7–15)
BUN SERPL-MCNC: 34.1 MG/DL (ref 8–23)
CALCIUM SERPL-MCNC: 8.7 MG/DL (ref 8.8–10.2)
CHLORIDE SERPL-SCNC: 93 MMOL/L (ref 98–107)
CREAT SERPL-MCNC: 0.72 MG/DL (ref 0.67–1.17)
DEPRECATED HCO3 PLAS-SCNC: 28 MMOL/L (ref 22–29)
EGFRCR SERPLBLD CKD-EPI 2021: >90 ML/MIN/1.73M2
GLUCOSE SERPL-MCNC: 127 MG/DL (ref 70–99)
PHOSPHATE SERPL-MCNC: 2.4 MG/DL (ref 2.5–4.5)
POTASSIUM SERPL-SCNC: 4.5 MMOL/L (ref 3.4–5.3)
SODIUM SERPL-SCNC: 128 MMOL/L (ref 135–145)

## 2024-07-11 PROCEDURE — 120N000001 HC R&B MED SURG/OB

## 2024-07-11 PROCEDURE — 250N000013 HC RX MED GY IP 250 OP 250 PS 637: Performed by: INTERNAL MEDICINE

## 2024-07-11 PROCEDURE — 36415 COLL VENOUS BLD VENIPUNCTURE: CPT | Performed by: INTERNAL MEDICINE

## 2024-07-11 PROCEDURE — 99232 SBSQ HOSP IP/OBS MODERATE 35: CPT | Performed by: INTERNAL MEDICINE

## 2024-07-11 PROCEDURE — G0463 HOSPITAL OUTPT CLINIC VISIT: HCPCS

## 2024-07-11 PROCEDURE — 99231 SBSQ HOSP IP/OBS SF/LOW 25: CPT | Mod: 25 | Performed by: INTERNAL MEDICINE

## 2024-07-11 PROCEDURE — 84100 ASSAY OF PHOSPHORUS: CPT | Performed by: INTERNAL MEDICINE

## 2024-07-11 PROCEDURE — 250N000013 HC RX MED GY IP 250 OP 250 PS 637: Performed by: PSYCHIATRY & NEUROLOGY

## 2024-07-11 PROCEDURE — 80048 BASIC METABOLIC PNL TOTAL CA: CPT | Performed by: INTERNAL MEDICINE

## 2024-07-11 RX ADMIN — HYDROCORTISONE 15 MG: 10 TABLET ORAL at 09:40

## 2024-07-11 RX ADMIN — BRIVARACETAM 100 MG: 10 SOLUTION ORAL at 22:35

## 2024-07-11 RX ADMIN — CARBAMAZEPINE 150 MG: 100 SUSPENSION ORAL at 09:43

## 2024-07-11 RX ADMIN — SENNOSIDES 8.6 MG: 8.6 TABLET, FILM COATED ORAL at 22:35

## 2024-07-11 RX ADMIN — Medication 15 G: at 09:42

## 2024-07-11 RX ADMIN — CYANOCOBALAMIN TAB 1000 MCG 1000 MCG: 1000 TAB at 09:41

## 2024-07-11 RX ADMIN — Medication 15 ML: at 09:43

## 2024-07-11 RX ADMIN — CITALOPRAM HYDROBROMIDE 10 MG: 10 TABLET ORAL at 09:41

## 2024-07-11 RX ADMIN — CARBAMAZEPINE 150 MG: 100 SUSPENSION ORAL at 22:34

## 2024-07-11 RX ADMIN — Medication 40 MG: at 09:43

## 2024-07-11 RX ADMIN — POTASSIUM & SODIUM PHOSPHATES POWDER PACK 280-160-250 MG 1 PACKET: 280-160-250 PACK at 09:41

## 2024-07-11 RX ADMIN — POLYETHYLENE GLYCOL 3350 17 G: 17 POWDER, FOR SOLUTION ORAL at 09:40

## 2024-07-11 RX ADMIN — BRIVARACETAM 100 MG: 10 SOLUTION ORAL at 12:03

## 2024-07-11 RX ADMIN — LEVOTHYROXINE SODIUM 112 MCG: 112 TABLET ORAL at 08:44

## 2024-07-11 RX ADMIN — SENNOSIDES 8.6 MG: 8.6 TABLET, FILM COATED ORAL at 09:41

## 2024-07-11 RX ADMIN — OXYCODONE HYDROCHLORIDE AND ACETAMINOPHEN 3000 MG: 500 TABLET ORAL at 09:41

## 2024-07-11 RX ADMIN — Medication 15 G: at 22:35

## 2024-07-11 RX ADMIN — Medication 60 ML: at 12:03

## 2024-07-11 RX ADMIN — Medication 100 MCG: at 09:41

## 2024-07-11 RX ADMIN — HYDROCORTISONE 7.5 MG: 5 TABLET ORAL at 13:36

## 2024-07-11 ASSESSMENT — ACTIVITIES OF DAILY LIVING (ADL)
ADLS_ACUITY_SCORE: 69
ADLS_ACUITY_SCORE: 69
ADLS_ACUITY_SCORE: 65
ADLS_ACUITY_SCORE: 65
ADLS_ACUITY_SCORE: 69
ADLS_ACUITY_SCORE: 64
ADLS_ACUITY_SCORE: 65
ADLS_ACUITY_SCORE: 69
ADLS_ACUITY_SCORE: 65
ADLS_ACUITY_SCORE: 69
ADLS_ACUITY_SCORE: 65

## 2024-07-11 NOTE — PROGRESS NOTES
Patient seen briefly.  Status quo.  Sleeping comfortably.  Continues on tube feed.  Vital stable.  Labs are pending from today.  No recommendations until labs are available.    Will follow.    Discussed with primary team.    Rigoberto Mcfarlane MD  [facility] - Nephrology   [phone]

## 2024-07-11 NOTE — PROGRESS NOTES
Shift: [id number] 7/10/2024  Summary: R hemiplegia, asp pneumonia, hyponatremia    Orientation: Alert to himself but unable to assess time, place, situation.   BEHAVIOR & AGGRESSION TOOL COLOR: Green     Vitals/Tele: VSS RA  PAIN MANAGMENT: No signs of pain  IV Access/drains: GJ Tube; PIV SL  Diet: TF infusing at 60ml/hr (goal rate) with 120ml FWF q4h (hold 1hr before/after levothyroxine, 8:00 to 10:00am)  Mobility: Bedfast; Total Care; A2 w/Lift   GI/: Incontinent; 1 BM this afternoon.   Wound/Skin: Scattered bruising, scabs, abrasions; preventative mepilex on heels. Wound/redness on buttocks - open to air  Consults: Nephrology   Discharge Plan: Discharge pending Na; Nephrology consulted. Home with home health care for RN, PT, and speech  OTHER IMPORTANT INFO: Seizure precautions. Urea BID for low Na.   Flow sheets for assessment
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
## 2024-07-11 NOTE — PLAN OF CARE
Goal Outcome Evaluation:         Date&Time:  7/10/2024 7008-5461  Summary: R hemiplegia, asp pneumonia, hyponatremia    Orientation: Nonverbal, Alert to himself but unable to assess time, place, situation.   BEHAVIOR & AGGRESSION TOOL COLOR: Green     Vitals/Tele: VSS RA  PAIN MANAGMENT: No signs of pain  IV Access/drains: GJ Tube; PIV SL  Diet: TF infusing at 60ml/hr (goal rate) with 120ml FWF q4h (hold 1hr before/after levothyroxine, 8:00 to 10:00am)  Mobility: Bedfast; Total Care; A2 w/Lift   GI/: Incontinent; 1 BM this afternoon.   Wound/Skin: Scattered bruising, scabs, abrasions; preventative mepilex on heels. Wound/redness on buttocks - open to air  Consults: Nephrology   Discharge Plan: Discharge pending Na improvement; Nephrology consulted. Home with home health care for RN, PT, and speech  OTHER IMPORTANT INFO: Seizure precautions. Urea BID for low Na.   Flow sheets for assessment

## 2024-07-11 NOTE — PLAN OF CARE
"Goal Outcome Evaluation:      Plan of Care Reviewed With: patient    Overall Patient Progress: improvingOverall Patient Progress: improving    Outcome Evaluation: awaiting labs    Date&Time:  7/10/2024 4942-9097    Orientation: Nonverbal, Alert, oriented to name spoken by RN but unable to assess time, place, situation.   BEHAVIOR & AGGRESSION TOOL COLOR: Green     Vitals/Tele: VSS RA  PAIN MANAGMENT: No signs of pain, appears comfortable, gives \"thumbs up\"   IV Access/drains: GJ Tube; PIV SL  Diet: TF infusing at 60ml/hr (goal rate) with 125ml free water flushes q4h.  TF held 1hr before/after levothyroxine, 8:00 to 10:00am   Mobility: Bedfast; Total Care; Ax2, not in a lift room but portable lift brought in to zero bed and get daily weight today   GI/: Incontinent; 1 BM this afternoon.   Wound/Skin: UE/LE scabs, abrasions; preventative mepilex on heels; redness on buttocks - open to air with barrier cream  Consults: Nephrology following awaiting AM labs to be drawn still   Discharge Plan: pending Na improvement goal 130; Nephrology consulted. Home with home health care for RN, PT, and speech  OTHER IMPORTANT INFO: Seizure precautions. Urea BID for low Na.   Flow sheets for assessment   "
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
## 2024-07-12 ENCOUNTER — MEDICAL CORRESPONDENCE (OUTPATIENT)
Dept: HEALTH INFORMATION MANAGEMENT | Facility: CLINIC | Age: 64
End: 2024-07-12

## 2024-07-12 VITALS
OXYGEN SATURATION: 93 % | TEMPERATURE: 97.8 F | SYSTOLIC BLOOD PRESSURE: 107 MMHG | HEART RATE: 71 BPM | WEIGHT: 153 LBS | DIASTOLIC BLOOD PRESSURE: 55 MMHG | RESPIRATION RATE: 16 BRPM | BODY MASS INDEX: 21.95 KG/M2

## 2024-07-12 LAB
ANION GAP SERPL CALCULATED.3IONS-SCNC: 10 MMOL/L (ref 7–15)
BUN SERPL-MCNC: 31.2 MG/DL (ref 8–23)
CALCIUM SERPL-MCNC: 8.9 MG/DL (ref 8.8–10.2)
CHLORIDE SERPL-SCNC: 91 MMOL/L (ref 98–107)
CREAT SERPL-MCNC: 0.7 MG/DL (ref 0.67–1.17)
DEPRECATED HCO3 PLAS-SCNC: 28 MMOL/L (ref 22–29)
EGFRCR SERPLBLD CKD-EPI 2021: >90 ML/MIN/1.73M2
GLUCOSE SERPL-MCNC: 97 MG/DL (ref 70–99)
POTASSIUM SERPL-SCNC: 4.1 MMOL/L (ref 3.4–5.3)
SODIUM SERPL-SCNC: 129 MMOL/L (ref 135–145)

## 2024-07-12 PROCEDURE — 36415 COLL VENOUS BLD VENIPUNCTURE: CPT | Performed by: INTERNAL MEDICINE

## 2024-07-12 PROCEDURE — 250N000013 HC RX MED GY IP 250 OP 250 PS 637: Performed by: INTERNAL MEDICINE

## 2024-07-12 PROCEDURE — 82374 ASSAY BLOOD CARBON DIOXIDE: CPT | Performed by: INTERNAL MEDICINE

## 2024-07-12 PROCEDURE — 82565 ASSAY OF CREATININE: CPT | Performed by: INTERNAL MEDICINE

## 2024-07-12 PROCEDURE — 99239 HOSP IP/OBS DSCHRG MGMT >30: CPT | Performed by: INTERNAL MEDICINE

## 2024-07-12 PROCEDURE — 250N000013 HC RX MED GY IP 250 OP 250 PS 637: Performed by: PSYCHIATRY & NEUROLOGY

## 2024-07-12 RX ORDER — SENNOSIDES 8.6 MG
1 TABLET ORAL 2 TIMES DAILY
Qty: 60 TABLET | Refills: 0 | Status: ON HOLD | OUTPATIENT
Start: 2024-07-12 | End: 2024-07-27

## 2024-07-12 RX ORDER — SODIUM CHLORIDE 1 G/1
1 TABLET ORAL 2 TIMES DAILY
Qty: 60 TABLET | Refills: 0 | Status: SHIPPED | OUTPATIENT
Start: 2024-07-12

## 2024-07-12 RX ORDER — POLYETHYLENE GLYCOL 3350 17 G/17G
17 POWDER, FOR SOLUTION ORAL DAILY
Qty: 510 G | Refills: 0 | Status: ON HOLD | OUTPATIENT
Start: 2024-07-12 | End: 2024-08-29

## 2024-07-12 RX ADMIN — SENNOSIDES 8.6 MG: 8.6 TABLET, FILM COATED ORAL at 10:16

## 2024-07-12 RX ADMIN — OXYCODONE HYDROCHLORIDE AND ACETAMINOPHEN 3000 MG: 500 TABLET ORAL at 10:31

## 2024-07-12 RX ADMIN — Medication 15 ML: at 10:31

## 2024-07-12 RX ADMIN — Medication 15 G: at 10:31

## 2024-07-12 RX ADMIN — Medication 100 MCG: at 10:31

## 2024-07-12 RX ADMIN — Medication 40 MG: at 09:30

## 2024-07-12 RX ADMIN — POLYETHYLENE GLYCOL 3350 17 G: 17 POWDER, FOR SOLUTION ORAL at 10:32

## 2024-07-12 RX ADMIN — LEVOTHYROXINE SODIUM 112 MCG: 112 TABLET ORAL at 08:46

## 2024-07-12 RX ADMIN — CITALOPRAM HYDROBROMIDE 10 MG: 10 TABLET ORAL at 10:31

## 2024-07-12 RX ADMIN — POTASSIUM & SODIUM PHOSPHATES POWDER PACK 280-160-250 MG 1 PACKET: 280-160-250 PACK at 00:32

## 2024-07-12 RX ADMIN — CARBAMAZEPINE 150 MG: 100 SUSPENSION ORAL at 10:31

## 2024-07-12 RX ADMIN — HYDROCORTISONE 15 MG: 10 TABLET ORAL at 10:16

## 2024-07-12 RX ADMIN — POTASSIUM & SODIUM PHOSPHATES POWDER PACK 280-160-250 MG 1 PACKET: 280-160-250 PACK at 04:34

## 2024-07-12 RX ADMIN — POTASSIUM & SODIUM PHOSPHATES POWDER PACK 280-160-250 MG 1 PACKET: 280-160-250 PACK at 08:46

## 2024-07-12 RX ADMIN — CYANOCOBALAMIN TAB 1000 MCG 1000 MCG: 1000 TAB at 10:31

## 2024-07-12 ASSESSMENT — ACTIVITIES OF DAILY LIVING (ADL)
ADLS_ACUITY_SCORE: 69
ADLS_ACUITY_SCORE: 65
ADLS_ACUITY_SCORE: 69

## 2024-07-12 ASSESSMENT — VISUAL ACUITY
OD: NOT TESTED
OU: NOT TESTED
OS: NOT TESTED

## 2024-07-12 NOTE — DISCHARGE SUMMARY
Discharge    Patient discharged to home via strecher with transportation services   Care plan note    Listed belongings gathered and given to patient (including from security/pharmacy). Yes  Care Plan and Patient education resolved: Yes  Prescriptions if needed, hard copies sent with patient  Yes  Medication Bin checked and emptied on discharge Yes  SW/care coordinator/charge RN aware of discharge: Yes     Transportation services ready to take the pt. senna and miralax not available with discharge meds. Family (Savannah) called and informed to buy it over the counter. Family called and informed to give the missed meds. Family agreed.

## 2024-07-12 NOTE — DISCHARGE SUMMARY
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 topically 2 times daily as needed    Associated Diagnoses: Recurrent pneumonia      multivitamin, therapeutic (THERA-VIT) TABS tablet 1 tablet by Per Feeding Tube route daily      mupirocin (BACTROBAN) 2 % external ointment APPLY TOPICALLY TO THE AFFECTED AREA TWICE DAILY AS NEEDED  Qty: 30 g, Refills: 1    Associated Diagnoses: Panhypopituitarism (H24); Hypogonadism male      pantoprazole (PROTONIX) 2 mg/mL SUSP suspension TAKE 20ML PER FEEDING TUBE DAILY  Qty: 600 mL, Refills: 3    Associated Diagnoses: Gastroesophageal reflux disease      potassium & sodium phosphates (NEUTRA-PHOS) 280-160-250 MG Packet Take 1 packet by mouth daily  Qty: 100 each, Refills: 3    Associated Diagnoses: Other feeding difficulties; Malnutrition, unspecified type (H24)      testosterone cypionate (DEPOTESTOSTERONE) 200 MG/ML injection INJECT 0.25ML IN THE MUSCLE ONCE A WEEK. Please schedule follow up for further refills.  Qty: 4 mL, Refills: 0    Associated Diagnoses: Panhypopituitarism (H24); Hypogonadism male      UNABLE TO FIND Take 0.25 teaspoonful by mouth daily MEDICATION NAME: pink salt 1/8 teaspoon po bid (instead of sodium bicarb).      vitamin C (ASCORBIC ACID) 1000 MG TABS 3,000 mg by Oral or Feeding Tube route daily     "  vitamin D3 (CHOLECALCIFEROL) 2000 units (50 mcg) tablet 4,000 Units by Per Feeding Tube route daily      COMPOUNDED NON-CONTROLLED SUBSTANCE (CMPD RX) - PHARMACY TO MIX COMPOUNDED MEDICATION Scopolamine 0.4mg capsules - take  2 capsule by feeding tube three times daily as needed  Qty: 90 capsule, Refills: 1    Associated Diagnoses: Excessive oral secretions      insulin syringe-needle U-100 (29G X 1/2\" 1 ML) 29G X 1/2\" 1 ML miscellaneous Syringe needle use as needed  Qty: 200 each, Refills: 11    Associated Diagnoses: Panhypopituitarism (H24)      Nutritional Supplements (BOOST HIGH PROTEIN) LIQD        !! - Potential duplicate medications found. Please discuss with provider.        STOP taking these medications       sodium bicarbonate 325 MG tablet Comments:   Reason for Stopping:             Allergies   Allergies   Allergen Reactions    Phenytoin Sodium Other (See Comments)     Shaking violently   Tremors    Valproic Acid Other (See Comments)     Toxicity w/ bone marrow suspension, elevated ammonia levels     Scopolamine Hives     Hives with the patch - oral no problem    Vancomycin Other (See Comments)     Vancomycin flushing syndrome - pt tolerated dose at half rate.     "

## 2024-07-12 NOTE — PROGRESS NOTES
Chart reviewed.  Noted plan for discharge.  Discussed with primary team.  Sodium is improving with cutting back on free water flushes.  Now up to 129.    Okay for discharge from renal standpoint.  Check labs with PCP in a week  Nephrology will sign off.  Please call back with questions.    Rigoberto Mcfarlane MD  [facility] - Nephrology   [phone]

## 2024-07-12 NOTE — PROGRESS NOTES
Care Management Discharge Note    Discharge Date: 07/12/2024       Discharge Disposition: Home, Home Care    Discharge Services: Home Care    Discharge DME: None    Discharge Transportation: health plan transportation    Private pay costs discussed: Not applicable    Does the patient's insurance plan have a 3 day qualifying hospital stay waiver?  No        Education Provided on the Discharge Plan:  yes  Persons Notified of Discharge Plans: Mother Savannah  Patient/Family in Agreement with the Plan: yes    Handoff Referral Completed: Yes    Additional Information:  Discussed discharge plans with patients mother. Transportation set for 13: by stretcher [employer].    Notified [facility] for start of care RN PT and Speech. Faxed orders to them [phone]    Anita Casillas RN [phone]

## 2024-07-12 NOTE — PLAN OF CARE
"  Date&Time:  7/11/24 3856-0752  Summary: R hemiplegia, asp pneumonia, hyponatremia    Orientation: Nonverbal, Alert to himself but unable to assess time, place, situation.   BEHAVIOR & AGGRESSION TOOL COLOR: Green     Vitals/Tele: VSS RA  PAIN MANAGMENT: No signs of pain, appears comfortable, gives \"thumbs up\"   IV Access/drains: GJ Tube; PIV SL  Diet: TF infusing at 60ml/hr (goal rate) with 125ml free water flushes q4h.   Mobility: Bedfast; Total Care; Ax2,   GI/: Incontinent; Large incontinent BM this shift  Wound/Skin: UE/LE scabs, abrasions; preventative mepilex on heels; redness on buttocks - mepi applied  Consults: Nephrology following   Discharge Plan: Na improvement to 128 goal 130; Phos replacing. AM recheck. Nephrology consulted. Home with home health care for RN, PT, and speech  OTHER IMPORTANT INFO: Seizure precautions. Urea BID for low Na.     "

## 2024-07-12 NOTE — PLAN OF CARE
"Goal Outcome Evaluation:  Date&Time:  7/11 0700 to 1500  Summary: R hemiplegia, asp pneumonia, hyponatremia    Orientation: Nonverbal, Alert to himself but unable to assess time, place, situation.   BEHAVIOR & AGGRESSION TOOL COLOR: Green     Vitals/Tele: VSS RA  PAIN MANAGMENT: No signs of pain, appears comfortable, gives \"thumbs up\"   IV Access/drains: GJ Tube; PIV SL  Diet: TF infusing at 60ml/hr (goal rate) with 125ml free water flushes q4h.  TF held 1hr before/after levothyroxine, 8:00 to 10:00am   Mobility: Bedfast; Total Care; Ax2,   GI/: Incontinent;   Wound/Skin: UE/LE scabs, abrasions; preventative mepilex on heels; redness on buttocks - mepi applied  Consults: Nephrology following   Discharge Plan: Na improvement to 128 goal 130; Phos replacing. AM recheck. Nephrology consulted. Home with home health care for RN, PT, and speech  OTHER IMPORTANT INFO: Seizure precautions. Urea BID for low Na.     "

## 2024-07-15 ENCOUNTER — TELEPHONE (OUTPATIENT)
Dept: FAMILY MEDICINE | Facility: CLINIC | Age: 64
End: 2024-07-15
Payer: MEDICARE

## 2024-07-15 ENCOUNTER — PATIENT OUTREACH (OUTPATIENT)
Dept: CARE COORDINATION | Facility: CLINIC | Age: 64
End: 2024-07-15
Payer: MEDICARE

## 2024-07-15 DIAGNOSIS — G81.01 FLACCID HEMIPLEGIA AFFECTING RIGHT DOMINANT SIDE, UNSPECIFIED ETIOLOGY (H): ICD-10-CM

## 2024-07-15 DIAGNOSIS — Z99.3 WHEELCHAIR DEPENDENCE: Primary | ICD-10-CM

## 2024-07-15 NOTE — PROGRESS NOTES
Clinic Care Coordination Contact  Transitions of Care Outreach  Chief Complaint   Patient presents with    Clinic Care Coordination - Post Hospital     Spoke with Guardian, Savannah.   She is in need of an IV pole, new hospital bed mattress and potentially a new hospital bed.   She has been giving patient Boost 20g 8oz vanilla bottles & Orgain 16g 11oz vanilla bottles. Will do a total of 5 bottles a day between the 2 brands.   She prefers this over the Jevity.   Reviewed to discuss with PCP. Potential need for GI Nutrition consult to make sure those products are sufficient nutritionally.     Discussed that above should be mentioned to patient's [employer] . They can help guide into if orders are needed and help with coverage.     Mom will outreach to [employer]  with hopes to have the details on what she may need from the PCP.   Mom plans to set up a post hospital follow up visit with PCP asap.     Home Care did not call yet. Mom has the phone number to call to inquire on the start date.       Most Recent Admission Date: 6/30/2024   Most Recent Admission Diagnosis: Hyponatremia - E87.1  Pancreatic cyst - K86.2  Severe sepsis (H) - A41.9, R65.20  Constipation, unspecified constipation type - K59.00  Pneumonia of both lower lobes due to infectious organism - J18.9     Most Recent Discharge Date: 7/12/2024   Most Recent Discharge Diagnosis: Severe sepsis (H) - A41.9, R65.20  Hyponatremia - E87.1  Pneumonia of both lower lobes due to infectious organism - J18.9  Constipation, unspecified constipation type - K59.00  Pancreatic cyst - K86.2  Sepsis due to pneumonia (H) - J18.9, A41.9  Sepsis, due to unspecified organism, unspecified whether acute organ dysfunction present (H) - A41.9  Pressure injury of buttock, stage 3, unspecified laterality (H) - L89.303  Dysphagia, unspecified type - R13.10  Aspiration pneumonia of right middle lobe, unspecified aspiration pneumonia type (H) - J69.0     Transitions of Care  Assessment    Discharge Assessment  How are you doing now that you are home?: Doing well. More alert.  How are your symptoms? (Red Flag symptoms escalate to triage hotline per guidelines): Improved  Do you know how to contact your clinic care team if you have future questions or changes to your health status? : Yes  Does the patient have their discharge instructions? : Yes  Does the patient have questions regarding their discharge instructions? : No  Were you started on any new medications or were there changes to any of your previous medications? : Yes  Does the patient have all of their medications?: Yes  Do you have questions regarding any of your medications? : No  Do you have all of your needed medical supplies or equipment (DME)?  (i.e. oxygen tank, CPAP, cane, etc.): Yes         Post-op (Clinicians Only)  Did the patient have surgery or a procedure: No  Fever: No  Chills: No  Pressure Reduction Devices: chair cushion utilized  Eating & Drinking: NPO (Tube fed)  PO Intake: other (Tube fed)  Additional Symptoms:  (denies)  Bowel Function: normal  Date of last BM: 07/12/24  Urinary Status: voiding without complaint/concerns    Care Management   None    Follow up Plan     Discharge Follow-Up  Discharge follow up appointment scheduled in alignment with recommended follow up timeframe or Transitions of Risk Category? (Low = within 30 days; Moderate= within 14 days; High= within 7 days): No  Patient's follow up appointment not scheduled: Patient accepted scheduling support. Appt scheduled/requested per protocol.    Future Appointments   Date Time Provider Department Center   7/24/2024  1:30 PM Faizan Mclean MD [facility]   9/6/2024  1:30 PM Mark Huntley MD [facility]       Outpatient Plan as outlined on AVS reviewed with patient.    For any urgent concerns, please contact our 24 hour nurse triage line: [phone] ([phone])       Courtney Nuñez RN Clinic Care Coordinator  [facility]  [place] Clin[facility]: [place], [place] (on-site Wednesdays), [facility] (on-site Thursdays) & [facility].  [email or web address]  Phone: [phone]

## 2024-07-15 NOTE — TELEPHONE ENCOUNTER
Order/Referral Request    Who is requesting: Patient's mother    Orders being requested: Urine Leg bags and Condom Catheter medium    Reason service is needed/diagnosis:     When are orders needed by:     Has this been discussed with Provider: Yes    Does patient have a preference on a Group/Provider/Facility? [facility]    Does patient have an appointment scheduled?: No    Where to send orders: Fax    Okay to leave a detailed message?: Yes at Other phone number:    Savannah [phone]

## 2024-07-16 ENCOUNTER — MEDICAL CORRESPONDENCE (OUTPATIENT)
Dept: HEALTH INFORMATION MANAGEMENT | Facility: CLINIC | Age: 64
End: 2024-07-16

## 2024-07-16 NOTE — TELEPHONE ENCOUNTER
Pended DME for catheter supplies - routing to PCP to please review and sign if appropriate - thank you!     Please route back to triage to assist with faxing orders to [facility] as requesting by Savannah Ferrer RN  [facility]

## 2024-07-19 ENCOUNTER — NURSE TRIAGE (OUTPATIENT)
Dept: INTERNAL MEDICINE | Facility: CLINIC | Age: 64
End: 2024-07-19

## 2024-07-19 ENCOUNTER — HOSPITAL ENCOUNTER (EMERGENCY)
Facility: CLINIC | Age: 64
Discharge: HOME | End: 2024-07-19
Attending: EMERGENCY MEDICINE | Admitting: EMERGENCY MEDICINE
Payer: MEDICARE

## 2024-07-19 ENCOUNTER — APPOINTMENT (OUTPATIENT)
Dept: GENERAL RADIOLOGY | Facility: CLINIC | Age: 64
End: 2024-07-19
Attending: EMERGENCY MEDICINE
Payer: MEDICARE

## 2024-07-19 VITALS
TEMPERATURE: 97.5 F | SYSTOLIC BLOOD PRESSURE: 105 MMHG | HEART RATE: 96 BPM | DIASTOLIC BLOOD PRESSURE: 91 MMHG | RESPIRATION RATE: 12 BRPM | OXYGEN SATURATION: 97 %

## 2024-07-19 DIAGNOSIS — R06.02 SHORTNESS OF BREATH: ICD-10-CM

## 2024-07-19 LAB
ALBUMIN SERPL BCG-MCNC: 3.9 G/DL (ref 3.5–5.2)
ALBUMIN UR-MCNC: 20 MG/DL
ALP SERPL-CCNC: 103 U/L (ref 40–150)
ALT SERPL W P-5'-P-CCNC: 31 U/L (ref 0–70)
ANION GAP SERPL CALCULATED.3IONS-SCNC: 10 MMOL/L (ref 7–15)
APPEARANCE UR: CLEAR
AST SERPL W P-5'-P-CCNC: 39 U/L (ref 0–45)
ATRIAL RATE - MUSE: 102 BPM
BASE EXCESS BLDV CALC-SCNC: 4 MMOL/L (ref -3–3)
BASOPHILS # BLD AUTO: 0.1 10E3/UL (ref 0–0.2)
BASOPHILS NFR BLD AUTO: 1 %
BILIRUB SERPL-MCNC: 0.6 MG/DL
BILIRUB UR QL STRIP: NEGATIVE
BUN SERPL-MCNC: 23.4 MG/DL (ref 8–23)
CALCIUM SERPL-MCNC: 9.1 MG/DL (ref 8.8–10.4)
CHLORIDE SERPL-SCNC: 99 MMOL/L (ref 98–107)
COLOR UR AUTO: YELLOW
CREAT SERPL-MCNC: 1 MG/DL (ref 0.67–1.17)
DIASTOLIC BLOOD PRESSURE - MUSE: NORMAL MMHG
EGFRCR SERPLBLD CKD-EPI 2021: 86 ML/MIN/1.73M2
EOSINOPHIL # BLD AUTO: 0.5 10E3/UL (ref 0–0.7)
EOSINOPHIL NFR BLD AUTO: 6 %
ERYTHROCYTE [DISTWIDTH] IN BLOOD BY AUTOMATED COUNT: 16 % (ref 10–15)
FLUAV RNA SPEC QL NAA+PROBE: NEGATIVE
FLUBV RNA RESP QL NAA+PROBE: NEGATIVE
GLUCOSE SERPL-MCNC: 124 MG/DL (ref 70–99)
GLUCOSE UR STRIP-MCNC: NEGATIVE MG/DL
HCO3 BLDV-SCNC: 30 MMOL/L (ref 21–28)
HCO3 SERPL-SCNC: 29 MMOL/L (ref 22–29)
HCT VFR BLD AUTO: 45.6 % (ref 40–53)
HGB BLD-MCNC: 14.6 G/DL (ref 13.3–17.7)
HGB UR QL STRIP: NEGATIVE
HOLD SPECIMEN: NORMAL
IMM GRANULOCYTES # BLD: 0 10E3/UL
IMM GRANULOCYTES NFR BLD: 0 %
INTERPRETATION ECG - MUSE: NORMAL
KETONES UR STRIP-MCNC: NEGATIVE MG/DL
LACTATE BLD-SCNC: 1.1 MMOL/L
LEUKOCYTE ESTERASE UR QL STRIP: NEGATIVE
LYMPHOCYTES # BLD AUTO: 2.6 10E3/UL (ref 0.8–5.3)
LYMPHOCYTES NFR BLD AUTO: 30 %
MCH RBC QN AUTO: 27 PG (ref 26.5–33)
MCHC RBC AUTO-ENTMCNC: 32 G/DL (ref 31.5–36.5)
MCV RBC AUTO: 84 FL (ref 78–100)
MONOCYTES # BLD AUTO: 0.9 10E3/UL (ref 0–1.3)
MONOCYTES NFR BLD AUTO: 10 %
NEUTROPHILS # BLD AUTO: 4.6 10E3/UL (ref 1.6–8.3)
NEUTROPHILS NFR BLD AUTO: 53 %
NITRATE UR QL: NEGATIVE
NRBC # BLD AUTO: 0 10E3/UL
NRBC BLD AUTO-RTO: 0 /100
P AXIS - MUSE: 78 DEGREES
PCO2 BLDV: 46 MM HG (ref 40–50)
PH BLDV: 7.42 [PH] (ref 7.32–7.43)
PH UR STRIP: 8 [PH] (ref 5–7)
PLATELET # BLD AUTO: 233 10E3/UL (ref 150–450)
PO2 BLDV: 54 MM HG (ref 25–47)
POTASSIUM SERPL-SCNC: 4.6 MMOL/L (ref 3.4–5.3)
PR INTERVAL - MUSE: 160 MS
PROCALCITONIN SERPL IA-MCNC: 0.08 NG/ML
PROT SERPL-MCNC: 8.1 G/DL (ref 6.4–8.3)
QRS DURATION - MUSE: 84 MS
QT - MUSE: 366 MS
QTC - MUSE: 477 MS
R AXIS - MUSE: -51 DEGREES
RBC # BLD AUTO: 5.4 10E6/UL (ref 4.4–5.9)
RBC URINE: 1 /HPF
RSV RNA SPEC NAA+PROBE: NEGATIVE
SAO2 % BLDV: 88 % (ref 70–75)
SARS-COV-2 RNA RESP QL NAA+PROBE: NEGATIVE
SODIUM SERPL-SCNC: 138 MMOL/L (ref 135–145)
SP GR UR STRIP: 1.02 (ref 1–1.03)
SQUAMOUS EPITHELIAL: <1 /HPF
SYSTOLIC BLOOD PRESSURE - MUSE: NORMAL MMHG
T AXIS - MUSE: 85 DEGREES
UROBILINOGEN UR STRIP-MCNC: NORMAL MG/DL
VENTRICULAR RATE- MUSE: 102 BPM
WBC # BLD AUTO: 8.8 10E3/UL (ref 4–11)
WBC URINE: 1 /HPF

## 2024-07-19 PROCEDURE — 96361 HYDRATE IV INFUSION ADD-ON: CPT

## 2024-07-19 PROCEDURE — 80053 COMPREHEN METABOLIC PANEL: CPT | Performed by: EMERGENCY MEDICINE

## 2024-07-19 PROCEDURE — 85025 COMPLETE CBC W/AUTO DIFF WBC: CPT | Performed by: EMERGENCY MEDICINE

## 2024-07-19 PROCEDURE — 71046 X-RAY EXAM CHEST 2 VIEWS: CPT

## 2024-07-19 PROCEDURE — 81001 URINALYSIS AUTO W/SCOPE: CPT | Performed by: EMERGENCY MEDICINE

## 2024-07-19 PROCEDURE — 99285 EMERGENCY DEPT VISIT HI MDM: CPT | Mod: 25

## 2024-07-19 PROCEDURE — 82803 BLOOD GASES ANY COMBINATION: CPT

## 2024-07-19 PROCEDURE — 87086 URINE CULTURE/COLONY COUNT: CPT | Performed by: EMERGENCY MEDICINE

## 2024-07-19 PROCEDURE — 36415 COLL VENOUS BLD VENIPUNCTURE: CPT | Performed by: EMERGENCY MEDICINE

## 2024-07-19 PROCEDURE — 84145 PROCALCITONIN (PCT): CPT | Performed by: EMERGENCY MEDICINE

## 2024-07-19 PROCEDURE — 87040 BLOOD CULTURE FOR BACTERIA: CPT | Performed by: EMERGENCY MEDICINE

## 2024-07-19 PROCEDURE — 87637 SARSCOV2&INF A&B&RSV AMP PRB: CPT | Performed by: EMERGENCY MEDICINE

## 2024-07-19 PROCEDURE — 96360 HYDRATION IV INFUSION INIT: CPT

## 2024-07-19 PROCEDURE — 93005 ELECTROCARDIOGRAM TRACING: CPT

## 2024-07-19 PROCEDURE — 258N000003 HC RX IP 258 OP 636: Performed by: EMERGENCY MEDICINE

## 2024-07-19 RX ADMIN — SODIUM CHLORIDE 1000 ML: 9 INJECTION, SOLUTION INTRAVENOUS at 15:01

## 2024-07-19 RX ADMIN — SODIUM CHLORIDE 1000 ML: 9 INJECTION, SOLUTION INTRAVENOUS at 17:00

## 2024-07-19 ASSESSMENT — COLUMBIA-SUICIDE SEVERITY RATING SCALE - C-SSRS
2. HAVE YOU ACTUALLY HAD ANY THOUGHTS OF KILLING YOURSELF IN THE PAST MONTH?: NO
6. HAVE YOU EVER DONE ANYTHING, STARTED TO DO ANYTHING, OR PREPARED TO DO ANYTHING TO END YOUR LIFE?: NO
1. IN THE PAST MONTH, HAVE YOU WISHED YOU WERE DEAD OR WISHED YOU COULD GO TO SLEEP AND NOT WAKE UP?: NO

## 2024-07-19 ASSESSMENT — ACTIVITIES OF DAILY LIVING (ADL)
ADLS_ACUITY_SCORE: 41

## 2024-07-19 NOTE — TELEPHONE ENCOUNTER
"99.9 axillary temp. Per guardian patient was breathing rapidly this morning. She woke him up and he is alert but then breathing problems return intermittently. Breathing sounds hard and labored to writer who recommended guardian call 911 now. She agrees with plan.     Nidhi Renee RN      Reason for Disposition   SEVERE difficulty breathing (e.g., struggling for each breath, speaks in single words, pulse > 120)   Sounds like a life-threatening emergency to the triager    Answer Assessment - Initial Assessment Questions  1. RESPIRATORY STATUS: \"Describe your breathing?\" (e.g., wheezing, shortness of breath, unable to speak, severe coughing)       Guardian describes as heavy breathing while sleeping. Guardian woke him up during call. RN can hear \"gurgling sounds and harsh breathing.   2. ONSET: \"When did this breathing problem begin?\"       Guardian heard rapid breathing when entering his room this morning while he was sleeping  3. PATTERN \"Does the difficult breathing come and go, or has it been constant since it started?\"       \"Calmed down a lot\" now per guardian but RN can still hear labored breathing.  4. SEVERITY: \"How bad is your breathing?\" (e.g., mild, moderate, severe)     - MILD: No SOB at rest, mild SOB with walking, speaks normally in sentences, can lie down, no retractions, pulse < 100.     - MODERATE: SOB at rest, SOB with minimal exertion and prefers to sit, cannot lie down flat, speaks in phrases, mild retractions, audible wheezing, pulse 100-120.     - SEVERE: Very SOB at rest, speaks in single words, struggling to breathe, sitting hunched forward, retractions, pulse > 120       Sounds severe to RN. Patient speaks very little per guardian so unable to assess breathing while speaking  5. RECURRENT SYMPTOM: \"Have you had difficulty breathing before?\" If Yes, ask: \"When was the last time?\" and \"What happened that time?\"       NA  6. CARDIAC HISTORY: \"Do you have any history of heart disease?\" (e.g., " "heart attack, angina, bypass surgery, angioplasty)       Hx of cardiac arrest  7. LUNG HISTORY: \"Do you have any history of lung disease?\"  (e.g., pulmonary embolus, asthma, emphysema)      Recent lengthy hospital stay for sepsis and pneumonia  8. CAUSE: \"What do you think is causing the breathing problem?\"       unknown  9. OTHER SYMPTOMS: \"Do you have any other symptoms? (e.g., dizziness, runny nose, cough, chest pain, fever)      Asked Guardian to ask if he feels ill and he shakes his head, \"no\".  10. O2 SATURATION MONITOR:  \"Do you use an oxygen saturation monitor (pulse oximeter) at home?\" If Yes, ask: \"What is your reading (oxygen level) today?\" \"What is your usual oxygen saturation reading?\" (e.g., 95%)        NA  11. PREGNANCY: \"Is there any chance you are pregnant?\" \"When was your last menstrual period?\"        NA    Protocols used: Breathing Difficulty-A-OH    "

## 2024-07-19 NOTE — ED NOTES
Pt mother Justyna called and updated on pt status and will be coming home, ambulance transport called for pt transfer home.

## 2024-07-19 NOTE — ED TRIAGE NOTES
Pt is a quad, lives at home with mom, c/o sob, no cough, sats low 90s on RA for ems, temp 100 at home this am per pt mother.

## 2024-07-19 NOTE — ED PROVIDER NOTES
Emergency Department Note      History of Present Illness     Chief Complaint   Shortness of Breath    HPI   Keyon Farias is a 63 year old male with a history of TBI, right-sided spastic hemiplegia, dysphasia, with frequent aspiration pneumonia, nonverbal and wheelchair bound, presenting to the ED for evaluation of shortness of breath. His mother reports that she noticed him this morning to have more rapid and hard breathing while asleep this morning. She took an axillary temperature which was 99.9. his temperature dropped down to 99.5 after she took his covers off and put a cold washcloth on his head, but it soon returned to 99.9. He uses a feeding tube exclusively. The only medication change since his last hospitalization is a sodium tablet twice a day. The patient denies pain, shortness of breath, or cough.    Independent Historian   Mother as detailed above.    Review of External Notes   I reviewed the ED note from 6/30/24 and discharge summary from 7/12/24 for septic encephalopathy secondary to aspiration pneumonia.     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  DVT  GERD  Panhypopituitarism  Pneumonia  Seizures  Sepsis  Septic shock  Spastic hemiplegia  Thyroid disease  Tracheostomy care  Unspecific cerebral artery occlusion with cerebral infarction  UTI  Ventricular fibrillation  Ventricular tachyarrhythmia  Thrombocytopenia  Leukocytosis  Esophagitis  Dysphagia  Pancreatitis  Cardiac arrest  Hyperlipidemia  Bladder calculus  Hemiparesis  Panhypopituitarism  Atelectasis  Hyponatremia  Appendicitis    Medications   Albuterol  Bacitracin  Briviact  Tegretol  Celexa  Robinul  Mucinex  Cortaid  Cortef  Insulin  Levothyroxine  Reglan  Micatin  Bactroban  Protonix  Transderm  Depotestosterone  Mucomyst  Aspirin  Brivaracetam  Hydrocortone  Optivar  Benefiber    Surgical History   Endoscopic ultrasound of upper GI  Esophagogastroduodenoscopy x5  Head and neck surgery    Gastro jejunostomy tube change   PICC exchange left  Appendectomy  Insert G tube  Right hand repair  Tracheostomy  Vascular surgery     Physical Exam     Patient Vitals for the past 24 hrs:   BP Temp Temp src Pulse Resp SpO2   07/19/24 1630 123/73 -- -- 96 -- --   07/19/24 1629 -- -- -- -- -- 97 %   07/19/24 1622 -- -- -- -- -- 96 %   07/19/24 1607 116/71 -- -- -- -- 98 %   07/19/24 1552 -- -- -- -- -- 98 %   07/19/24 1538 116/74 -- -- -- -- 99 %   07/19/24 1523 121/69 -- -- 98 -- 97 %   07/19/24 1442 -- -- -- 105 12 92 %   07/19/24 1427 -- -- -- 101 19 92 %   07/19/24 1342 117/69 -- -- 105 21 --   07/19/24 1338 -- 97.5  F (36.4  C) Temporal -- -- 90 %   07/19/24 1337 117/69 -- -- 106 -- --     Physical Exam  Physical Exam   Constitutional:   Nonverbal. Lying supine in bed with his mouth open. Responds with thumbs up.   HENT:   Mouth/Throat:   Mucous membranes dry.  Eyes:    Conjunctivae normal and EOM are normal. Pupils are equal, round, and reactive to light.   Neck:    Normal range of motion.   Cardiovascular: Normal rate, regular rhythm and normal heart sounds.  Exam reveals no gallop and no friction rub.  No murmur heard.  Pulmonary/Chest:  Diminished breath sounds bilaterally. Patient has no wheezes. Patient has no rales. No bronchi.   Abdominal:   PEG in place skin clean dry intact.   Musculoskeletal:  No edema   Neurological:   Patient is alert, nonverbal. Right extremity spasticity  Skin:   Skin is warm and dry. No rash noted. No erythema.   Psychiatric:   Unable to assess.     Diagnostics     Lab Results   Labs Ordered and Resulted from Time of ED Arrival to Time of ED Departure   COMPREHENSIVE METABOLIC PANEL - Abnormal       Result Value    Sodium 138      Potassium 4.6      Carbon Dioxide (CO2) 29      Anion Gap 10      Urea Nitrogen 23.4 (*)     Creatinine 1.00      GFR Estimate 86      Calcium 9.1      Chloride 99      Glucose 124 (*)     Alkaline Phosphatase 103      AST 39      ALT 31      Protein  Total 8.1      Albumin 3.9      Bilirubin Total 0.6     ROUTINE UA WITH MICROSCOPIC REFLEX TO CULTURE - Abnormal    Color Urine Yellow      Appearance Urine Clear      Glucose Urine Negative      Bilirubin Urine Negative      Ketones Urine Negative      Specific Gravity Urine 1.024      Blood Urine Negative      pH Urine 8.0 (*)     Protein Albumin Urine 20 (*)     Urobilinogen Urine Normal      Nitrite Urine Negative      Leukocyte Esterase Urine Negative      RBC Urine 1      WBC Urine 1      Squamous Epithelials Urine <1     CBC WITH PLATELETS AND DIFFERENTIAL - Abnormal    WBC Count 8.8      RBC Count 5.40      Hemoglobin 14.6      Hematocrit 45.6      MCV 84      MCH 27.0      MCHC 32.0      RDW 16.0 (*)     Platelet Count 233      % Neutrophils 53      % Lymphocytes 30      % Monocytes 10      % Eosinophils 6      % Basophils 1      % Immature Granulocytes 0      NRBCs per 100 WBC 0      Absolute Neutrophils 4.6      Absolute Lymphocytes 2.6      Absolute Monocytes 0.9      Absolute Eosinophils 0.5      Absolute Basophils 0.1      Absolute Immature Granulocytes 0.0      Absolute NRBCs 0.0     ISTAT GASES LACTATE VENOUS POCT - Abnormal    Lactic Acid POCT 1.1      Bicarbonate Venous POCT 30 (*)     O2 Sat, Venous POCT 88 (*)     pCO2 Venous POCT 46      pH Venous POCT 7.42      pO2 Venous POCT 54 (*)     Base Excess/Deficit (+/-) POCT 4.0 (*)    INFLUENZA A/B, RSV, & SARS-COV2 PCR - Normal    Influenza A PCR Negative      Influenza B PCR Negative      RSV PCR Negative      SARS CoV2 PCR Negative     PROCALCITONIN - Normal    Procalcitonin 0.08     URINE CULTURE   BLOOD CULTURE   BLOOD CULTURE     Imaging   XR Chest 2 Views   Final Result   IMPRESSION:  There are no acute infiltrates. The cardiac silhouette is   not enlarged. Pulmonary vasculature is unremarkable.       BERT ESPINOZA MD            SYSTEM ID:  [id number]      Report per radiology.     EKG   ECG taken at 1427, ECG read at 1431  Sinus tachycardia    Left anterior fascicular block    No significant change as compared to prior, dated 4/27/24.  Rate 102 bpm. PA interval 160 ms. QRS duration 84 ms. QT/QTc 366/477 ms. P-R-T axes 78 -51 85.    Independent Interpretation   None    ED Course      Medications Administered   Medications   sodium chloride 0.9% BOLUS 1,000 mL (0 mLs Intravenous Stopped 7/19/24 1622)   sodium chloride 0.9% BOLUS 1,000 mL (0 mLs Intravenous Stopped 7/19/24 1734)     Discussion of Management   None    ED Course   ED Course as of 07/19/24 1750   Fri Jul 19, 2024   1425 I obtained history and examined the patient as noted above.     1428 I called the patient's mother Savannah and obtained additional history.    1637 I updated Savannah about discharging the patient.        Optional/Additional Documentation  None    Medical Decision Making / Diagnosis     CMS Diagnoses: None    MIPS       None    [facility]   Keyon Farias is a 63 year old male male who presents with concern for shortness of breath.  He has frequent recurrences of aspiration ammonia.  He uses a PEG tube exclusively.  I did speak with the mother after evaluating the patient.  She had noted that he might of had some more labored breathing while he was asleep this morning.  Workup was done in this patient.  His lactic acid and procalcitonin are normal.  He is afebrile here.  He was initially mildly tachycardic so he did receive IV fluids and his heart rate came down to normal.  Urinalysis was checked and this is negative.  Chest x-ray does not show any evidence of pneumonia.  His white blood cell count is normal.  His sodium which has been problematically low is normal as well she has been giving him sodium tablets.  His renal function is at baseline.  He is not on any oxygen use.  At this time I do not see any medical reason why he needs hospitalization.  I spoke with the patient's mother she is comfortable with this plan.  She will return if there is any new or concerning  symptoms.    Disposition   The patient was discharged by [facility] transport  Diagnosis     ICD-10-CM    1. Shortness of breath  R06.02            Discharge Medications   New Prescriptions    No medications on file     Scribe Disclosure:  I, Melany Reva, am serving as a scribe at 2:39 PM on 7/19/2024 to document services personally performed by Courtney Frazier MD based on my observations and the provider's statements to me.        Courtney Frazier MD  07/19/24 9741

## 2024-07-21 LAB — BACTERIA UR CULT: NO GROWTH

## 2024-07-23 ENCOUNTER — APPOINTMENT (OUTPATIENT)
Dept: GENERAL RADIOLOGY | Facility: CLINIC | Age: 64
DRG: 871 | End: 2024-07-23
Attending: EMERGENCY MEDICINE
Payer: MEDICARE

## 2024-07-23 ENCOUNTER — HOSPITAL ENCOUNTER (INPATIENT)
Facility: CLINIC | Age: 64
LOS: 4 days | Discharge: HOME HEALTH | DRG: 871 | End: 2024-07-27
Attending: EMERGENCY MEDICINE | Admitting: HOSPITALIST
Payer: MEDICARE

## 2024-07-23 DIAGNOSIS — N39.0 ACUTE UTI: ICD-10-CM

## 2024-07-23 DIAGNOSIS — J69.0 ASPIRATION PNEUMONIA OF RIGHT MIDDLE LOBE, UNSPECIFIED ASPIRATION PNEUMONIA TYPE (H): Primary | ICD-10-CM

## 2024-07-23 DIAGNOSIS — A41.9 ACUTE SEPSIS (H): ICD-10-CM

## 2024-07-23 DIAGNOSIS — J96.01 ACUTE RESPIRATORY FAILURE WITH HYPOXIA (H): ICD-10-CM

## 2024-07-23 DIAGNOSIS — J69.0 ASPIRATION PNEUMONIA OF RIGHT LOWER LOBE, UNSPECIFIED ASPIRATION PNEUMONIA TYPE (H): ICD-10-CM

## 2024-07-23 DIAGNOSIS — S06.9XAA UNSPECIFIED INTRACRANIAL INJURY WITH LOSS OF CONSCIOUSNESS STATUS UNKNOWN, INITIAL ENCOUNTER (H): ICD-10-CM

## 2024-07-23 DIAGNOSIS — J69.0 ASPIRATION PNEUMONITIS (H): ICD-10-CM

## 2024-07-23 LAB
ALBUMIN SERPL BCG-MCNC: 3.7 G/DL (ref 3.5–5.2)
ALBUMIN UR-MCNC: 30 MG/DL
ALP SERPL-CCNC: 99 U/L (ref 40–150)
ALT SERPL W P-5'-P-CCNC: ABNORMAL U/L
AMORPH CRY #/AREA URNS HPF: ABNORMAL /HPF
ANION GAP SERPL CALCULATED.3IONS-SCNC: 9 MMOL/L (ref 7–15)
APPEARANCE UR: CLEAR
AST SERPL W P-5'-P-CCNC: ABNORMAL U/L
ATRIAL RATE - MUSE: 117 BPM
BACTERIA #/AREA URNS HPF: ABNORMAL /HPF
BASE EXCESS BLDV CALC-SCNC: 7 MMOL/L (ref -3–3)
BASOPHILS # BLD AUTO: 0.1 10E3/UL (ref 0–0.2)
BASOPHILS NFR BLD AUTO: 1 %
BILIRUB SERPL-MCNC: 0.3 MG/DL
BILIRUB UR QL STRIP: NEGATIVE
BUN SERPL-MCNC: 22 MG/DL (ref 8–23)
CALCIUM SERPL-MCNC: 8.5 MG/DL (ref 8.8–10.4)
CHLORIDE SERPL-SCNC: 100 MMOL/L (ref 98–107)
COLOR UR AUTO: YELLOW
CREAT SERPL-MCNC: 1.02 MG/DL (ref 0.67–1.17)
DIASTOLIC BLOOD PRESSURE - MUSE: NORMAL MMHG
EGFRCR SERPLBLD CKD-EPI 2021: 84 ML/MIN/1.73M2
EOSINOPHIL # BLD AUTO: 0.4 10E3/UL (ref 0–0.7)
EOSINOPHIL NFR BLD AUTO: 3 %
ERYTHROCYTE [DISTWIDTH] IN BLOOD BY AUTOMATED COUNT: 16 % (ref 10–15)
FLUAV RNA SPEC QL NAA+PROBE: NEGATIVE
FLUBV RNA RESP QL NAA+PROBE: NEGATIVE
GIANT PLATELETS BLD QL SMEAR: SLIGHT
GLUCOSE SERPL-MCNC: 110 MG/DL (ref 70–99)
GLUCOSE UR STRIP-MCNC: NEGATIVE MG/DL
HCO3 BLDV-SCNC: 30 MMOL/L (ref 21–28)
HCO3 SERPL-SCNC: 28 MMOL/L (ref 22–29)
HCT VFR BLD AUTO: 43.2 % (ref 40–53)
HGB BLD-MCNC: 14.2 G/DL (ref 13.3–17.7)
HGB UR QL STRIP: NEGATIVE
IMM GRANULOCYTES # BLD: 0 10E3/UL
IMM GRANULOCYTES NFR BLD: 0 %
INTERPRETATION ECG - MUSE: NORMAL
KETONES UR STRIP-MCNC: NEGATIVE MG/DL
LACTATE BLD-SCNC: 1.5 MMOL/L
LEUKOCYTE ESTERASE UR QL STRIP: ABNORMAL
LYMPHOCYTES # BLD AUTO: 2 10E3/UL (ref 0.8–5.3)
LYMPHOCYTES NFR BLD AUTO: 16 %
MCH RBC QN AUTO: 27.8 PG (ref 26.5–33)
MCHC RBC AUTO-ENTMCNC: 32.9 G/DL (ref 31.5–36.5)
MCV RBC AUTO: 85 FL (ref 78–100)
MONOCYTES # BLD AUTO: 0.9 10E3/UL (ref 0–1.3)
MONOCYTES NFR BLD AUTO: 7 %
NEUTROPHILS # BLD AUTO: 9.7 10E3/UL (ref 1.6–8.3)
NEUTROPHILS NFR BLD AUTO: 74 %
NITRATE UR QL: NEGATIVE
NRBC # BLD AUTO: 0 10E3/UL
NRBC BLD AUTO-RTO: 0 /100
P AXIS - MUSE: 58 DEGREES
PCO2 BLDV: 40 MM HG (ref 40–50)
PH BLDV: 7.49 [PH] (ref 7.32–7.43)
PH UR STRIP: 8 [PH] (ref 5–7)
PLAT MORPH BLD: ABNORMAL
PLATELET # BLD AUTO: 139 10E3/UL (ref 150–450)
PO2 BLDV: 61 MM HG (ref 25–47)
POTASSIUM SERPL-SCNC: 5.1 MMOL/L (ref 3.4–5.3)
PR INTERVAL - MUSE: 164 MS
PROT SERPL-MCNC: 7.7 G/DL (ref 6.4–8.3)
QRS DURATION - MUSE: 82 MS
QT - MUSE: 336 MS
QTC - MUSE: 468 MS
R AXIS - MUSE: -52 DEGREES
RBC # BLD AUTO: 5.1 10E6/UL (ref 4.4–5.9)
RBC MORPH BLD: ABNORMAL
RBC URINE: 2 /HPF
RSV RNA SPEC NAA+PROBE: NEGATIVE
SAO2 % BLDV: 93 % (ref 70–75)
SARS-COV-2 RNA RESP QL NAA+PROBE: NEGATIVE
SODIUM SERPL-SCNC: 137 MMOL/L (ref 135–145)
SP GR UR STRIP: 1.03 (ref 1–1.03)
SYSTOLIC BLOOD PRESSURE - MUSE: NORMAL MMHG
T AXIS - MUSE: 64 DEGREES
UROBILINOGEN UR STRIP-MCNC: NORMAL MG/DL
VENTRICULAR RATE- MUSE: 117 BPM
WBC # BLD AUTO: 13.1 10E3/UL (ref 4–11)
WBC URINE: 99 /HPF

## 2024-07-23 PROCEDURE — 93005 ELECTROCARDIOGRAM TRACING: CPT

## 2024-07-23 PROCEDURE — 120N000001 HC R&B MED SURG/OB

## 2024-07-23 PROCEDURE — 81001 URINALYSIS AUTO W/SCOPE: CPT | Performed by: EMERGENCY MEDICINE

## 2024-07-23 PROCEDURE — 87040 BLOOD CULTURE FOR BACTERIA: CPT | Performed by: EMERGENCY MEDICINE

## 2024-07-23 PROCEDURE — 71046 X-RAY EXAM CHEST 2 VIEWS: CPT

## 2024-07-23 PROCEDURE — 87186 SC STD MICRODIL/AGAR DIL: CPT | Performed by: EMERGENCY MEDICINE

## 2024-07-23 PROCEDURE — 258N000003 HC RX IP 258 OP 636: Performed by: HOSPITALIST

## 2024-07-23 PROCEDURE — 99291 CRITICAL CARE FIRST HOUR: CPT | Mod: 25

## 2024-07-23 PROCEDURE — 84155 ASSAY OF PROTEIN SERUM: CPT | Performed by: EMERGENCY MEDICINE

## 2024-07-23 PROCEDURE — 96365 THER/PROPH/DIAG IV INF INIT: CPT

## 2024-07-23 PROCEDURE — 82803 BLOOD GASES ANY COMBINATION: CPT

## 2024-07-23 PROCEDURE — 87637 SARSCOV2&INF A&B&RSV AMP PRB: CPT | Performed by: EMERGENCY MEDICINE

## 2024-07-23 PROCEDURE — 250N000011 HC RX IP 250 OP 636: Performed by: EMERGENCY MEDICINE

## 2024-07-23 PROCEDURE — 250N000013 HC RX MED GY IP 250 OP 250 PS 637: Performed by: HOSPITALIST

## 2024-07-23 PROCEDURE — 96361 HYDRATE IV INFUSION ADD-ON: CPT

## 2024-07-23 PROCEDURE — 36415 COLL VENOUS BLD VENIPUNCTURE: CPT | Performed by: EMERGENCY MEDICINE

## 2024-07-23 PROCEDURE — 258N000003 HC RX IP 258 OP 636: Performed by: EMERGENCY MEDICINE

## 2024-07-23 PROCEDURE — 85025 COMPLETE CBC W/AUTO DIFF WBC: CPT | Performed by: EMERGENCY MEDICINE

## 2024-07-23 PROCEDURE — 99223 1ST HOSP IP/OBS HIGH 75: CPT | Mod: AI | Performed by: HOSPITALIST

## 2024-07-23 PROCEDURE — 250N000011 HC RX IP 250 OP 636: Performed by: HOSPITALIST

## 2024-07-23 RX ORDER — METOCLOPRAMIDE HYDROCHLORIDE 5 MG/5ML
10 SOLUTION ORAL 2 TIMES DAILY
Status: DISCONTINUED | OUTPATIENT
Start: 2024-07-23 | End: 2024-07-27 | Stop reason: HOSPADM

## 2024-07-23 RX ORDER — LIDOCAINE 40 MG/G
CREAM TOPICAL
Status: DISCONTINUED | OUTPATIENT
Start: 2024-07-23 | End: 2024-07-27 | Stop reason: HOSPADM

## 2024-07-23 RX ORDER — ENOXAPARIN SODIUM 100 MG/ML
40 INJECTION SUBCUTANEOUS EVERY 24 HOURS
Status: DISCONTINUED | OUTPATIENT
Start: 2024-07-23 | End: 2024-07-27 | Stop reason: HOSPADM

## 2024-07-23 RX ORDER — CARBAMAZEPINE 100 MG/5ML
150 SUSPENSION ORAL 3 TIMES DAILY
Status: DISCONTINUED | OUTPATIENT
Start: 2024-07-24 | End: 2024-07-27 | Stop reason: HOSPADM

## 2024-07-23 RX ORDER — SODIUM CHLORIDE 1 G/1
1 TABLET ORAL 2 TIMES DAILY
Status: DISCONTINUED | OUTPATIENT
Start: 2024-07-23 | End: 2024-07-27 | Stop reason: HOSPADM

## 2024-07-23 RX ORDER — SODIUM CHLORIDE 9 MG/ML
INJECTION, SOLUTION INTRAVENOUS CONTINUOUS
Status: DISCONTINUED | OUTPATIENT
Start: 2024-07-23 | End: 2024-07-26

## 2024-07-23 RX ORDER — LANOLIN ALCOHOL/MO/W.PET/CERES
1000 CREAM (GRAM) TOPICAL DAILY
Status: DISCONTINUED | OUTPATIENT
Start: 2024-07-24 | End: 2024-07-27 | Stop reason: HOSPADM

## 2024-07-23 RX ORDER — ACETAMINOPHEN 325 MG/10.15ML
650 LIQUID ORAL EVERY 8 HOURS PRN
Status: DISCONTINUED | OUTPATIENT
Start: 2024-07-23 | End: 2024-07-27 | Stop reason: HOSPADM

## 2024-07-23 RX ORDER — ALBUTEROL SULFATE 0.83 MG/ML
2.5 SOLUTION RESPIRATORY (INHALATION) EVERY 6 HOURS PRN
Status: DISCONTINUED | OUTPATIENT
Start: 2024-07-23 | End: 2024-07-27 | Stop reason: HOSPADM

## 2024-07-23 RX ORDER — CARBAMAZEPINE 100 MG/5ML
100 SUSPENSION ORAL DAILY
Status: DISCONTINUED | OUTPATIENT
Start: 2024-07-23 | End: 2024-07-27 | Stop reason: HOSPADM

## 2024-07-23 RX ORDER — POLYETHYLENE GLYCOL 3350 17 G/17G
17 POWDER, FOR SOLUTION ORAL DAILY
Status: DISCONTINUED | OUTPATIENT
Start: 2024-07-24 | End: 2024-07-27 | Stop reason: HOSPADM

## 2024-07-23 RX ORDER — LEVOTHYROXINE SODIUM 112 UG/1
112 TABLET ORAL
Status: DISCONTINUED | OUTPATIENT
Start: 2024-07-24 | End: 2024-07-27 | Stop reason: HOSPADM

## 2024-07-23 RX ORDER — GLYCOPYRROLATE 1 MG/1
1 TABLET ORAL 2 TIMES DAILY PRN
Status: DISCONTINUED | OUTPATIENT
Start: 2024-07-23 | End: 2024-07-27 | Stop reason: HOSPADM

## 2024-07-23 RX ORDER — SENNOSIDES 8.6 MG
650 CAPSULE ORAL EVERY 8 HOURS PRN
COMMUNITY
End: 2024-08-23

## 2024-07-23 RX ORDER — PIPERACILLIN SODIUM, TAZOBACTAM SODIUM 3; .375 G/15ML; G/15ML
3.38 INJECTION, POWDER, LYOPHILIZED, FOR SOLUTION INTRAVENOUS EVERY 6 HOURS
Status: DISCONTINUED | OUTPATIENT
Start: 2024-07-23 | End: 2024-07-27 | Stop reason: HOSPADM

## 2024-07-23 RX ORDER — SENNOSIDES 8.6 MG
1 TABLET ORAL 2 TIMES DAILY PRN
Status: DISCONTINUED | OUTPATIENT
Start: 2024-07-23 | End: 2024-07-27 | Stop reason: HOSPADM

## 2024-07-23 RX ORDER — GUAIFENESIN 600 MG/1
600 TABLET, EXTENDED RELEASE ORAL 2 TIMES DAILY PRN
Status: DISCONTINUED | OUTPATIENT
Start: 2024-07-23 | End: 2024-07-27 | Stop reason: HOSPADM

## 2024-07-23 RX ORDER — ACETYLCYSTEINE 200 MG/ML
2 SOLUTION ORAL; RESPIRATORY (INHALATION) 4 TIMES DAILY
Status: DISCONTINUED | OUTPATIENT
Start: 2024-07-23 | End: 2024-07-27 | Stop reason: HOSPADM

## 2024-07-23 RX ORDER — PIPERACILLIN SODIUM, TAZOBACTAM SODIUM 4; .5 G/20ML; G/20ML
4.5 INJECTION, POWDER, LYOPHILIZED, FOR SOLUTION INTRAVENOUS ONCE
Status: COMPLETED | OUTPATIENT
Start: 2024-07-23 | End: 2024-07-23

## 2024-07-23 RX ORDER — BACITRACIN ZINC 500 [USP'U]/G
OINTMENT TOPICAL DAILY PRN
Status: DISCONTINUED | OUTPATIENT
Start: 2024-07-23 | End: 2024-07-27 | Stop reason: HOSPADM

## 2024-07-23 RX ORDER — CALCIUM CARBONATE 500 MG/1
1000 TABLET, CHEWABLE ORAL 4 TIMES DAILY PRN
Status: DISCONTINUED | OUTPATIENT
Start: 2024-07-23 | End: 2024-07-27 | Stop reason: HOSPADM

## 2024-07-23 RX ADMIN — PIPERACILLIN AND TAZOBACTAM 3.38 G: 3; .375 INJECTION, POWDER, FOR SOLUTION INTRAVENOUS at 22:44

## 2024-07-23 RX ADMIN — CARBAMAZEPINE 100 MG: 100 SUSPENSION ORAL at 22:43

## 2024-07-23 RX ADMIN — PIPERACILLIN AND TAZOBACTAM 4.5 G: 4; .5 INJECTION, POWDER, FOR SOLUTION INTRAVENOUS at 17:21

## 2024-07-23 RX ADMIN — ENOXAPARIN SODIUM 40 MG: 40 INJECTION SUBCUTANEOUS at 22:41

## 2024-07-23 RX ADMIN — SODIUM CHLORIDE TAB 1 GM 1 G: 1 TAB at 22:41

## 2024-07-23 RX ADMIN — METOCLOPRAMIDE HYDROCHLORIDE 10 MG: 5 SOLUTION ORAL at 22:43

## 2024-07-23 RX ADMIN — SODIUM CHLORIDE 1000 ML: 9 INJECTION, SOLUTION INTRAVENOUS at 18:21

## 2024-07-23 RX ADMIN — BRIVARACETAM 100 MG: 10 SOLUTION ORAL at 22:41

## 2024-07-23 RX ADMIN — SODIUM CHLORIDE: 9 INJECTION, SOLUTION INTRAVENOUS at 21:00

## 2024-07-23 RX ADMIN — HYDROCORTISONE SODIUM SUCCINATE 100 MG: 100 INJECTION, POWDER, FOR SOLUTION INTRAMUSCULAR; INTRAVENOUS at 19:58

## 2024-07-23 RX ADMIN — SODIUM CHLORIDE 1000 ML: 9 INJECTION, SOLUTION INTRAVENOUS at 16:30

## 2024-07-23 ASSESSMENT — ACTIVITIES OF DAILY LIVING (ADL)
ADLS_ACUITY_SCORE: 41
ADLS_ACUITY_SCORE: 53
ADLS_ACUITY_SCORE: 41
ADLS_ACUITY_SCORE: 45
ADLS_ACUITY_SCORE: 53
ADLS_ACUITY_SCORE: 41

## 2024-07-23 NOTE — ED NOTES
[facility]  ED Nurse Handoff Report    ED Chief complaint: Shortness of Breath and Fever (Patient presents via EMS from home with reports of fever and shortness of breath per mother. Patient recently seen here at hospital a few days ago with similar sx. Tylenol last at 1400. )      ED Diagnosis:   Final diagnoses:   Acute respiratory failure with hypoxia (H)   Acute sepsis (H)   Aspiration pneumonia of right lower lobe, unspecified aspiration pneumonia type (H)   Acute UTI       Code Status: Full Code    Allergies:   Allergies   Allergen Reactions    Valproic Acid Other (See Comments)     Toxicity w/ bone marrow suspension, elevated ammonia levels    Toxicity with bone marrow suspension   Elevated ammonia levels    Poisens system    Scopolamine Hives     Hives with the patch - oral no problem    Vancomycin Other (See Comments)     Vancomycin flushing syndrome - pt tolerated dose at half rate.    Phenytoin Sodium        Patient Story: Patient was seen here at [facility] 4 days ago with shortness of breath. He had a negative workup including a chest X-ray which was negative for pneumonia. He was subsequently discharged to home. Today, he returns to the ED for evaluation of fevers and shortness of breath. EMS found the patient to have oxygen saturations of 88% on room air and was placed on 4L of supplemental oxygen via nasal cannula. His blood pressure was noted to be 92/60. He had a 1000 mg dose of Tylenol 2 hours ago at 1400.   Focused Assessment:  respiratory     Treatments and/or interventions provided: IV, labs, x-ray, fluids and antibiotic  Patient's response to treatments and/or interventions: VSS    To be done/followed up on inpatient unit: fluids and antibiotic    Does this patient have any cognitive concerns?:  patient is non-verbal    Activity level - Baseline/Home:  Total Care  Activity Level - Current:   Total Care    Patient's Preferred language: English   Needed?:  No    Isolation: Contact  Infection: Not Applicable  Other   Patient tested for COVID 19 prior to admission: YES  Bariatric?: No    Vital Signs:   Vitals:    07/23/24 1715 07/23/24 1730 07/23/24 1745 07/23/24 1800   BP: 112/72 95/66 92/59 101/70   Pulse: (!) 122 117 117 (!) 121   Resp: 26 15 (!) 8 16   Temp:       TempSrc:       SpO2: 97% 95% 95% 95%       Cardiac Rhythm:Cardiac Rhythm: Normal sinus rhythm;Sinus tachycardia    Was the PSS-3 completed:   No -unable to answer  What interventions are required if any?               Family Comments: supportive mother   OBS brochure/video discussed/provided to patient/family: No              Name of person given brochure if not patient:               Relationship to patient:     For the majority of the shift this patient's behavior was Green.   Behavioral interventions performed were none.    ED NURSE PHONE NUMBER: *[phone]

## 2024-07-23 NOTE — ED PROVIDER NOTES
Emergency Department Note      History of Present Illness     Chief Complaint   Shortness of Breath and Fever    [facility]   Keyon Farias is a 63 year old male with a complex past medical history including TBI with right-sided spastic hemiplegia, CVA, epilepsy, hypertrophic obstructive cardiomyopathy, recurrent pneumonia, cardiac arrest, among others who presents to the ED via EMS for fevers and shortness of breath. Per chart review, he was seen here at [facility] 4 days ago with shortness of breath. He had a negative workup including a chest X-ray which was negative for pneumonia. He was subsequently discharged to home. Today, he returns to the ED for evaluation of fevers and shortness of breath. EMS found the patient to have oxygen saturations of 88% on room air and was placed on 4L of supplemental oxygen via nasal cannula. His blood pressure was noted to be 92/60. He had a 1000 mg dose of Tylenol 2 hours ago at 1400.    Savannah, his mother, reports that Keyon had been doing well until this morning when he woke up with a high fever of 102.9F. This prompted her to call for EMS to bring Keyon to the ED. No cough, vomiting, diarrhea. No known sick contacts.    Independent Historian   Mother as detailed above.    Review of External Notes   I reviewed patient's ED records from 4 days ago with notable information discussed in the HPI.    Past Medical History     Medical History and Problem List   TBI with encephalopathy  DVT  GERD  Panhypopituitarism  Pneumonia  Sepsis due to urinary tract infection  Septic shock  Spastic hemiplegia  CVA  Ventricular fibrillation  Ventricular tachyarrhythmia  Appendicitis  Intractable epilepsy with status epilepticus  Hyponatremia  Cardiac arrest  Acute respiratory failure with hypoxia  Necrotizing pancreatitis  Acid reflux  Pulmonary aspiration, chronic  Contracture of hand joint, right  Dysphagia related to TBI  Free intraperitoneal air  COVID-19   Pneumoperitoneum  Pressure injury of skin  of buttock  Protein-calorie malnutrition  Hypothyroidism  Hypogonadism male  Acute renal failure  Bladder calculus  Atelectasis  Hyperlipidemia  Vitamin D deficiency  Hypertrophic obstructive cardiomyopathy  Persistent depressive disorder  Pancreatic cyst    Medications   Albuterol nebulization  Briviact  Carbamazepine  Celexa  Glycopyrrolate  Mucinex  Hydrocortisone  Insulin  Levothyroxine  Reglan  Protonix  Miralax  Senokot  Testosterone    Surgical History   Reconstructive facial surgery  Gastrotomy tube placement  Appendectomy  Tracheostomy  Vascular surgery  Hand surgery, right  Dental extraction and restoration x2  Contracture release, right arm and ankle    Physical Exam     Patient Vitals for the past 24 hrs:   BP Temp Temp src Pulse Resp SpO2   07/23/24 1730 95/66 -- -- 117 15 95 %   07/23/24 1715 112/72 -- -- (!) 122 26 97 %   07/23/24 1645 105/67 -- -- (!) 123 15 97 %   07/23/24 1630 98/68 -- -- (!) 123 12 92 %   07/23/24 1615 102/79 -- -- (!) 128 13 99 %   07/23/24 1601 102/69 -- -- (!) 125 (!) 35 --   07/23/24 1600 102/69 -- -- (!) 125 12 --   07/23/24 1545 104/69 -- -- (!) 125 27 94 %   07/23/24 1530 116/65 -- -- -- -- 92 %   07/23/24 1514 109/64 98.1  F (36.7  C) Tympanic (!) 125 21 91 %     Physical Exam  Nursing note and vitals reviewed.  Constitutional:  Appears well-developed and well-nourished. Productive sounding cough.  HENT:   Head:    Atraumatic.   Mouth/Throat:   Dry mucous membranes with thick mucus on the tongue and oropharynx.  Eyes:    Pupils are equal, round, and reactive to light.   Neck:    Normal range of motion. Neck supple.      No tracheal deviation present. No thyromegaly present.   Cardiovascular:  Normal rate, regular rhythm, no murmur   Pulmonary/Chest: Crackles in both lung bases. Productive sounding cough.  Abdominal:   Soft. Bowel sounds are normal. Exhibits no distension and      no mass. There is no tenderness.      There is no rebound and no guarding.      G tube in  place without surrounding redness or drainage.  Musculoskeletal:  Exhibits no edema. Diffuse muscle wasting of the extremities.  Lymphadenopathy:  No cervical adenopathy.   Neurological:   Awake and alert. Nonverbal. Makes good eye contact. Diffuse muscle wasting consistent with his paralysis.  Skin:    Skin is warm and dry. No rash noted. No pallor.       No decubitus ulcers. Feet appear normal without redness or ulcers.    Diagnostics     Lab Results   Labs Ordered and Resulted from Time of ED Arrival to Time of ED Departure   ISTAT GASES LACTATE VENOUS POCT - Abnormal       Result Value    Lactic Acid POCT 1.5      Bicarbonate Venous POCT 30 (*)     O2 Sat, Venous POCT 93 (*)     pCO2 Venous POCT 40      pH Venous POCT 7.49 (*)     pO2 Venous POCT 61 (*)     Base Excess/Deficit (+/-) POCT 7.0 (*)    COMPREHENSIVE METABOLIC PANEL - Abnormal    Sodium 137      Potassium 5.1      Carbon Dioxide (CO2) 28      Anion Gap 9      Urea Nitrogen 22.0      Creatinine 1.02      GFR Estimate 84      Calcium 8.5 (*)     Chloride 100      Glucose 110 (*)     Alkaline Phosphatase 99      AST        ALT        Protein Total 7.7      Albumin 3.7      Bilirubin Total 0.3     CBC WITH PLATELETS AND DIFFERENTIAL - Abnormal    WBC Count 13.1 (*)     RBC Count 5.10      Hemoglobin 14.2      Hematocrit 43.2      MCV 85      MCH 27.8      MCHC 32.9      RDW 16.0 (*)     Platelet Count 139 (*)     % Neutrophils 74      % Lymphocytes 16      % Monocytes 7      % Eosinophils 3      % Basophils 1      % Immature Granulocytes 0      NRBCs per 100 WBC 0      Absolute Neutrophils 9.7 (*)     Absolute Lymphocytes 2.0      Absolute Monocytes 0.9      Absolute Eosinophils 0.4      Absolute Basophils 0.1      Absolute Immature Granulocytes 0.0      Absolute NRBCs 0.0     RBC AND PLATELET MORPHOLOGY - Abnormal    RBC Morphology Confirmed RBC Indices      Platelet Assessment   (*)     Value: Automated Count Confirmed. Giant platelets are present.     Giant Platelets Slight (*)    ROUTINE UA WITH MICROSCOPIC REFLEX TO CULTURE - Abnormal    Color Urine Yellow      Appearance Urine Clear      Glucose Urine Negative      Bilirubin Urine Negative      Ketones Urine Negative      Specific Gravity Urine 1.027      Blood Urine Negative      pH Urine 8.0 (*)     Protein Albumin Urine 30 (*)     Urobilinogen Urine Normal      Nitrite Urine Negative      Leukocyte Esterase Urine Large (*)     Bacteria Urine Few (*)     Amorphous Crystals Urine Few (*)     RBC Urine 2      WBC Urine 99 (*)    INFLUENZA A/B, RSV, & SARS-COV2 PCR - Normal    Influenza A PCR Negative      Influenza B PCR Negative      RSV PCR Negative      SARS CoV2 PCR Negative     URINE CULTURE   BLOOD CULTURE     ECG  ECG taken at 1756, ECG read at 1804  Sinus tachycardia  Left anterior fascicular block  Possible anteroseptal infarct, age undetermined  Abnormal ECG  Rate 117 bpm. OK interval 164 ms. QRS duration 82 ms. QT/QTc 336/468 ms. P-R-T axes 58 -52 64.     Imaging   XR Chest 2 Views   Final Result   IMPRESSION:       Lung volumes are low with a mildly elevated right hemidiaphragm. An airspace opacity at the right lung base could either reflect atelectasis and/or infection/aspiration.       Left lung is clear. No pleural effusions or pneumothorax.      Normal pulmonary vascularity and cardiac size.        Independent Interpretation   CXR shows infiltrate in the right lower lobe consistent with pneumonia.    ED Course      Medications Administered   Medications   sodium chloride (PF) 0.9% PF flush 3 mL (3 mLs Intracatheter $Given 7/23/24 1610)   sodium chloride (PF) 0.9% PF flush 3 mL (3 mLs Intracatheter $Given 7/23/24 1620)   sodium chloride 0.9% BOLUS 1,000 mL (has no administration in time range)   sodium chloride 0.9% BOLUS 1,000 mL (1,000 mLs Intravenous $New Bag 7/23/24 1630)   piperacillin-tazobactam (ZOSYN) 4.5 g vial to attach to  mL bag (0 g Intravenous Stopped 7/23/24 1754)      Discussion of Management   None    ED Course   ED Course as of 07/23/24 1806 Tue Jul 23, 2024   8467 I obtained history and performed an examination of the patient.    1602 I spoke with Savannah, the patient's mother, and gathered further details about the patient's presentation.   1805 I spoke with Dr. Galan from the hospitalist service regarding the patient's presentation, findings here in the ED, and plan of care.      Optional/Additional Documentation  None    Medical Decision Making / Diagnosis     CMS Diagnoses: None    MIPS   None    MDM   Keyon Farias is a 63 year old male who I found to have acute respiratory failure with hypoxia and acute sepsis syndrome due to aspiration pneumonia of the right lower lung and acute UTI.  He was given IV fluids and Zosyn for antibiotics.  He was given stress dose hydrocortisone IV steroid dose.  He has a benign abdominal exam without any vomiting and I did not feel that there was any concern for other serious intra-abdominal process.  I do not feel there was concern for pulmonary embolism either.  He is exhibiting sinus tachycardic which I feel is due to the sepsis syndrome and improved with IV fluids.  He was admitted to the care of the hospitalist service for further evaluation treatment.    Disposition   The patient was admitted to the hospital.     Diagnosis     ICD-10-CM    1. Acute respiratory failure with hypoxia (H)  J96.01       2. Acute sepsis (H)  A41.9       3. Aspiration pneumonia of right lower lobe, unspecified aspiration pneumonia type (H)  J69.0         Scribe Disclosure:  I, Marion Roberson, am serving as a scribe at 4:34 PM on 7/23/2024 to document services personally performed by Sahara Miles MD based on my observations and the provider's statements to me.        Sahara Miles MD  07/23/24 5726

## 2024-07-23 NOTE — ED TRIAGE NOTES
Patient presents via EMS from home with reports of fever and shortness of breath per mother. Patient recently seen here at hospital a few days ago with similar sx. Tylenol last at 1400.      Triage Assessment (Adult)       Row Name 07/23/24 1608          Triage Assessment    Airway WDL WDL        Respiratory WDL    Respiratory WDL cough     Cough Frequency infrequent     Cough Type congested        Skin Circulation/Temperature WDL    Skin Circulation/Temperature WDL WDL        Cardiac WDL    Cardiac WDL WDL     Cardiac Rhythm NSR;ST        Peripheral/Neurovascular WDL    Peripheral Neurovascular WDL WDL        Cognitive/Neuro/Behavioral WDL    Cognitive/Neuro/Behavioral WDL level of consciousness     Level of Consciousness alert

## 2024-07-23 NOTE — PHARMACY-ADMISSION MEDICATION HISTORY
Pharmacist Admission Medication History    Admission medication history is complete. The information provided in this note is only as accurate as the sources available at the time of the update.    Information Source(s): Caregiver via phone    Pertinent Information:     Changes made to PTA medication list:  Added: Acetaminophen controlled release   Deleted: None  Changed: None    Allergies reviewed with patient and updates made in EHR: no    Medication History Completed By: Adarsh [facility] 7/23/2024 5:08 PM    PTA Med List   Medication Sig Last Dose    acetaminophen (TYLENOL 8 HOUR) 650 MG CR tablet Take 650 mg by mouth every 8 hours as needed for mild pain or fever 7/23/2024 at AM    acetaminophen (TYLENOL) 325 MG tablet Take 650 mg by mouth every 6 hours as needed for mild pain 7/23/2024 at AM    albuterol (PROVENTIL) (5 MG/ML) 0.5% neb solution Take 0.5 mLs (2.5 mg) by nebulization every 6 hours as needed for shortness of breath, wheezing or cough 0700 1100 1500 1900 with mucomyst  at PRN    bacitracin 500 UNIT/GM external ointment Apply topically daily as needed for wound care To PEG site.  at PRN    Brivaracetam (BRIVIACT) 10 MG/ML solution 100 mg by Oral or Feeding Tube route 2 times daily 0900, 2100 7/23/2024 at AM    carBAMazepine (TEGRETOL) 100 MG/5ML suspension 7.5 mLs (150 mg) by Oral or Feeding Tube route 3 times daily At 06:00, 12:00, and 24:00 for seizures 7/23/2024 at 1200    carBAMazepine (TEGRETOL) 100 MG/5ML suspension 100 mg by Per Feeding Tube route daily Take at 1800 7/22/2024 at 1800    COMPOUNDED NON-CONTROLLED SUBSTANCE (CMPD RX) - PHARMACY TO MIX COMPOUNDED MEDICATION Scopolamine 0.4mg capsules - take  2 capsule by feeding tube three times daily as needed  at PRN    Cyanocobalamin (VITAMIN B-12 PO) 1,000 mcg by Per Feeding Tube route daily 7/23/2024 at AM    glycopyrrolate (ROBINUL) 1 MG tablet TAKE 1 TABLET(1 MG) BY MOUTH TWICE DAILY AS NEEDED FOR SECRETIONS  at PRN    guaiFENesin  (MUCINEX) 600 MG 12 hr tablet Take 1 tablet (600 mg) by mouth 2 times daily as needed for congestion  at PRN    hydrocortisone (CORTAID) 1 % external cream Apply topically 2 times daily as needed Apply to reddened memo areas as needed  at PRN    hydrocortisone (CORTEF) 5 MG tablet Take 15 mg (3 tablets) in the morning and 7.5 mg (1.5 tablet)  at 2:00 PM. During illness patient takes more as a stress dose. Please increase the dose as directed. (Patient taking differently: Take 15 mg (3 tablets) in the morning and 7.5 mg (1.5 tablet)  at 6:00 PM. Per feeding tube. During illness patient takes more as a stress dose. Mother reports doubling each dose for fever) 7/23/2024 at AM    levothyroxine (SYNTHROID/LEVOTHROID) 112 MCG tablet TAKE 1 TABLET(112 MCG) BY MOUTH DAILY 7/23/2024 at 0500    metoclopramide (REGLAN) 10 MG/10ML SOLN solution Take 10 mLs (10 mg) by mouth 4 times daily (before meals and nightly) 0800, 1200, 1600, 2000 Disconnects bag before administration, then waits 45 mins before reconnecting after giving the medication (Patient taking differently: Take 10 mg by mouth 2 times daily 0800, 1200, 1600, 2000 Disconnects bag before administration, then waits 45 mins before reconnecting after giving the medication) 7/23/2024 at AM    miconazole (MICATIN) 2 % external powder Apply topically 2 times daily as needed  at PRN    multivitamin, therapeutic (THERA-VIT) TABS tablet 1 tablet by Per Feeding Tube route daily 7/23/2024 at AM    mupirocin (BACTROBAN) 2 % external ointment APPLY TOPICALLY TO THE AFFECTED AREA TWICE DAILY AS NEEDED  at PRN    pantoprazole (PROTONIX) 2 mg/mL SUSP suspension TAKE 20ML PER FEEDING TUBE DAILY (Patient taking differently: Place 20 mg into Feeding Tube daily) 7/22/2024    polyethylene glycol (MIRALAX) 17 GM/Dose powder Take 17 g by mouth daily 7/22/2024    potassium & sodium phosphates (NEUTRA-PHOS) 280-160-250 MG Packet Take 1 packet by mouth daily 7/23/2024    sennosides (SENOKOT)  8.6 MG tablet Take 1 tablet by mouth 2 times daily (Patient taking differently: Take 1 tablet by mouth 2 times daily as needed for constipation)  at PRN    sodium chloride 1 GM tablet Place 1 tablet (1 g) into Feeding Tube 2 times daily 7/23/2024 at AM    testosterone cypionate (DEPOTESTOSTERONE) 200 MG/ML injection INJECT 0.25ML IN THE MUSCLE ONCE A WEEK. Please schedule follow up for further refills. 7/21/2024 at Sunday    vitamin C (ASCORBIC ACID) 1000 MG TABS 3,000 mg by Oral or Feeding Tube route daily 7/23/2024 at AM    vitamin D3 (CHOLECALCIFEROL) 2000 units (50 mcg) tablet 4,000 Units by Per Feeding Tube route daily 7/23/2024 at AM

## 2024-07-23 NOTE — ED NOTES
Bed: ED14  Expected date:   Expected time:   Means of arrival:   Comments:  Kerri 1 63 M fever TBI quad eta 1510
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
## 2024-07-24 LAB
ANION GAP SERPL CALCULATED.3IONS-SCNC: 6 MMOL/L (ref 7–15)
BACTERIA BLD CULT: NO GROWTH
BACTERIA BLD CULT: NO GROWTH
BASE EXCESS BLDV CALC-SCNC: -0.3 MMOL/L (ref -3–3)
BASOPHILS # BLD AUTO: 0 10E3/UL (ref 0–0.2)
BASOPHILS NFR BLD AUTO: 0 %
BUN SERPL-MCNC: 17.9 MG/DL (ref 8–23)
CALCIUM SERPL-MCNC: 7.3 MG/DL (ref 8.8–10.4)
CHLORIDE SERPL-SCNC: 108 MMOL/L (ref 98–107)
CREAT SERPL-MCNC: 1.02 MG/DL (ref 0.67–1.17)
D DIMER PPP FEU-MCNC: 0.67 UG/ML FEU (ref 0–0.5)
EGFRCR SERPLBLD CKD-EPI 2021: 84 ML/MIN/1.73M2
EOSINOPHIL # BLD AUTO: 0 10E3/UL (ref 0–0.7)
EOSINOPHIL NFR BLD AUTO: 0 %
ERYTHROCYTE [DISTWIDTH] IN BLOOD BY AUTOMATED COUNT: 15.7 % (ref 10–15)
GLUCOSE BLDC GLUCOMTR-MCNC: 136 MG/DL (ref 70–99)
GLUCOSE BLDC GLUCOMTR-MCNC: 147 MG/DL (ref 70–99)
GLUCOSE BLDC GLUCOMTR-MCNC: 166 MG/DL (ref 70–99)
GLUCOSE SERPL-MCNC: 148 MG/DL (ref 70–99)
HCO3 BLDV-SCNC: 26 MMOL/L (ref 21–28)
HCO3 SERPL-SCNC: 26 MMOL/L (ref 22–29)
HCT VFR BLD AUTO: 35.9 % (ref 40–53)
HGB BLD-MCNC: 11.6 G/DL (ref 13.3–17.7)
IMM GRANULOCYTES # BLD: 0 10E3/UL
IMM GRANULOCYTES NFR BLD: 0 %
LACTATE SERPL-SCNC: 1.4 MMOL/L (ref 0.7–2)
LYMPHOCYTES # BLD AUTO: 1.2 10E3/UL (ref 0.8–5.3)
LYMPHOCYTES NFR BLD AUTO: 14 %
MAGNESIUM SERPL-MCNC: 2.1 MG/DL (ref 1.7–2.3)
MCH RBC QN AUTO: 28.4 PG (ref 26.5–33)
MCHC RBC AUTO-ENTMCNC: 32.3 G/DL (ref 31.5–36.5)
MCV RBC AUTO: 88 FL (ref 78–100)
MONOCYTES # BLD AUTO: 0.3 10E3/UL (ref 0–1.3)
MONOCYTES NFR BLD AUTO: 3 %
NEUTROPHILS # BLD AUTO: 7 10E3/UL (ref 1.6–8.3)
NEUTROPHILS NFR BLD AUTO: 82 %
NRBC # BLD AUTO: 0 10E3/UL
NRBC BLD AUTO-RTO: 0 /100
O2/TOTAL GAS SETTING VFR VENT: 28 %
OXYHGB MFR BLDV: 80 % (ref 70–75)
PCO2 BLDV: 49 MM HG (ref 40–50)
PH BLDV: 7.34 [PH] (ref 7.32–7.43)
PHOSPHATE SERPL-MCNC: 1.2 MG/DL (ref 2.5–4.5)
PLATELET # BLD AUTO: 182 10E3/UL (ref 150–450)
PO2 BLDV: 49 MM HG (ref 25–47)
POTASSIUM SERPL-SCNC: 4.4 MMOL/L (ref 3.4–5.3)
PROCALCITONIN SERPL IA-MCNC: 0.21 NG/ML
RBC # BLD AUTO: 4.09 10E6/UL (ref 4.4–5.9)
SAO2 % BLDV: 81.9 % (ref 70–75)
SODIUM SERPL-SCNC: 140 MMOL/L (ref 135–145)
TROPONIN T SERPL HS-MCNC: 40 NG/L
WBC # BLD AUTO: 8.6 10E3/UL (ref 4–11)

## 2024-07-24 PROCEDURE — 258N000003 HC RX IP 258 OP 636: Performed by: NURSE PRACTITIONER

## 2024-07-24 PROCEDURE — P9045 ALBUMIN (HUMAN), 5%, 250 ML: HCPCS | Performed by: NURSE PRACTITIONER

## 2024-07-24 PROCEDURE — 99207 PR APP CREDIT; MD BILLING SHARED VISIT: CPT | Performed by: HOSPITALIST

## 2024-07-24 PROCEDURE — 99291 CRITICAL CARE FIRST HOUR: CPT | Performed by: NURSE PRACTITIONER

## 2024-07-24 PROCEDURE — 84484 ASSAY OF TROPONIN QUANT: CPT | Performed by: NURSE PRACTITIONER

## 2024-07-24 PROCEDURE — 84145 PROCALCITONIN (PCT): CPT | Performed by: NURSE PRACTITIONER

## 2024-07-24 PROCEDURE — 85379 FIBRIN DEGRADATION QUANT: CPT | Performed by: NURSE PRACTITIONER

## 2024-07-24 PROCEDURE — 258N000003 HC RX IP 258 OP 636: Performed by: HOSPITALIST

## 2024-07-24 PROCEDURE — 82805 BLOOD GASES W/O2 SATURATION: CPT | Performed by: NURSE PRACTITIONER

## 2024-07-24 PROCEDURE — 250N000011 HC RX IP 250 OP 636: Performed by: NURSE PRACTITIONER

## 2024-07-24 PROCEDURE — 83735 ASSAY OF MAGNESIUM: CPT | Performed by: HOSPITALIST

## 2024-07-24 PROCEDURE — 258N000003 HC RX IP 258 OP 636: Performed by: INTERNAL MEDICINE

## 2024-07-24 PROCEDURE — 120N000001 HC R&B MED SURG/OB

## 2024-07-24 PROCEDURE — 999N000157 HC STATISTIC RCP TIME EA 10 MIN

## 2024-07-24 PROCEDURE — 94640 AIRWAY INHALATION TREATMENT: CPT | Mod: 76

## 2024-07-24 PROCEDURE — 250N000011 HC RX IP 250 OP 636: Performed by: HOSPITALIST

## 2024-07-24 PROCEDURE — 36415 COLL VENOUS BLD VENIPUNCTURE: CPT | Performed by: NURSE PRACTITIONER

## 2024-07-24 PROCEDURE — 94640 AIRWAY INHALATION TREATMENT: CPT

## 2024-07-24 PROCEDURE — 84100 ASSAY OF PHOSPHORUS: CPT | Performed by: HOSPITALIST

## 2024-07-24 PROCEDURE — 85025 COMPLETE CBC W/AUTO DIFF WBC: CPT | Performed by: NURSE PRACTITIONER

## 2024-07-24 PROCEDURE — 250N000013 HC RX MED GY IP 250 OP 250 PS 637: Performed by: HOSPITALIST

## 2024-07-24 PROCEDURE — 250N000009 HC RX 250: Performed by: HOSPITALIST

## 2024-07-24 PROCEDURE — 83605 ASSAY OF LACTIC ACID: CPT | Performed by: NURSE PRACTITIONER

## 2024-07-24 PROCEDURE — 80048 BASIC METABOLIC PNL TOTAL CA: CPT | Performed by: NURSE PRACTITIONER

## 2024-07-24 RX ORDER — DEXTROSE MONOHYDRATE 100 MG/ML
INJECTION, SOLUTION INTRAVENOUS CONTINUOUS PRN
Status: DISCONTINUED | OUTPATIENT
Start: 2024-07-24 | End: 2024-07-27 | Stop reason: HOSPADM

## 2024-07-24 RX ADMIN — HYDROCORTISONE SODIUM SUCCINATE 50 MG: 100 INJECTION, POWDER, FOR SOLUTION INTRAMUSCULAR; INTRAVENOUS at 04:13

## 2024-07-24 RX ADMIN — ACETYLCYSTEINE 2 ML: 200 SOLUTION ORAL; RESPIRATORY (INHALATION) at 14:40

## 2024-07-24 RX ADMIN — METOCLOPRAMIDE HYDROCHLORIDE 10 MG: 5 SOLUTION ORAL at 21:34

## 2024-07-24 RX ADMIN — ALBUTEROL SULFATE 2.5 MG: 2.5 SOLUTION RESPIRATORY (INHALATION) at 07:30

## 2024-07-24 RX ADMIN — ALBUMIN HUMAN 25 G: 0.05 INJECTION, SOLUTION INTRAVENOUS at 06:22

## 2024-07-24 RX ADMIN — HYDROCORTISONE SODIUM SUCCINATE 50 MG: 100 INJECTION, POWDER, FOR SOLUTION INTRAMUSCULAR; INTRAVENOUS at 21:25

## 2024-07-24 RX ADMIN — SODIUM CHLORIDE 500 ML: 9 INJECTION, SOLUTION INTRAVENOUS at 21:25

## 2024-07-24 RX ADMIN — BRIVARACETAM 100 MG: 10 SOLUTION ORAL at 09:13

## 2024-07-24 RX ADMIN — CARBAMAZEPINE 150 MG: 100 SUSPENSION ORAL at 01:16

## 2024-07-24 RX ADMIN — PIPERACILLIN AND TAZOBACTAM 3.38 G: 3; .375 INJECTION, POWDER, FOR SOLUTION INTRAVENOUS at 11:59

## 2024-07-24 RX ADMIN — SODIUM CHLORIDE: 9 INJECTION, SOLUTION INTRAVENOUS at 07:28

## 2024-07-24 RX ADMIN — HYDROCORTISONE SODIUM SUCCINATE 50 MG: 100 INJECTION, POWDER, FOR SOLUTION INTRAMUSCULAR; INTRAVENOUS at 11:56

## 2024-07-24 RX ADMIN — SODIUM CHLORIDE 1000 ML: 9 INJECTION, SOLUTION INTRAVENOUS at 02:55

## 2024-07-24 RX ADMIN — POLYETHYLENE GLYCOL 3350 17 G: 17 POWDER, FOR SOLUTION ORAL at 09:14

## 2024-07-24 RX ADMIN — CARBAMAZEPINE 100 MG: 100 SUSPENSION ORAL at 18:20

## 2024-07-24 RX ADMIN — ACETYLCYSTEINE 2 ML: 200 SOLUTION ORAL; RESPIRATORY (INHALATION) at 18:49

## 2024-07-24 RX ADMIN — PIPERACILLIN AND TAZOBACTAM 3.38 G: 3; .375 INJECTION, POWDER, FOR SOLUTION INTRAVENOUS at 05:35

## 2024-07-24 RX ADMIN — LEVOTHYROXINE SODIUM 112 MCG: 112 TABLET ORAL at 09:08

## 2024-07-24 RX ADMIN — ALBUTEROL SULFATE 2.5 MG: 2.5 SOLUTION RESPIRATORY (INHALATION) at 14:38

## 2024-07-24 RX ADMIN — SODIUM CHLORIDE TAB 1 GM 1 G: 1 TAB at 09:17

## 2024-07-24 RX ADMIN — ACETYLCYSTEINE 2 ML: 200 SOLUTION ORAL; RESPIRATORY (INHALATION) at 07:33

## 2024-07-24 RX ADMIN — ENOXAPARIN SODIUM 40 MG: 40 INJECTION SUBCUTANEOUS at 21:24

## 2024-07-24 RX ADMIN — CYANOCOBALAMIN TAB 1000 MCG 1000 MCG: 1000 TAB at 09:08

## 2024-07-24 RX ADMIN — POTASSIUM & SODIUM PHOSPHATES POWDER PACK 280-160-250 MG 1 PACKET: 280-160-250 PACK at 09:08

## 2024-07-24 RX ADMIN — SODIUM CHLORIDE TAB 1 GM 1 G: 1 TAB at 21:24

## 2024-07-24 RX ADMIN — CARBAMAZEPINE 150 MG: 100 SUSPENSION ORAL at 14:49

## 2024-07-24 RX ADMIN — ALBUTEROL SULFATE 2.5 MG: 2.5 SOLUTION RESPIRATORY (INHALATION) at 18:49

## 2024-07-24 RX ADMIN — ALBUTEROL SULFATE 2.5 MG: 2.5 SOLUTION RESPIRATORY (INHALATION) at 11:23

## 2024-07-24 RX ADMIN — CARBAMAZEPINE 150 MG: 100 SUSPENSION ORAL at 06:55

## 2024-07-24 RX ADMIN — PIPERACILLIN AND TAZOBACTAM 3.38 G: 3; .375 INJECTION, POWDER, FOR SOLUTION INTRAVENOUS at 18:18

## 2024-07-24 RX ADMIN — METOCLOPRAMIDE HYDROCHLORIDE 10 MG: 5 SOLUTION ORAL at 09:17

## 2024-07-24 RX ADMIN — BRIVARACETAM 100 MG: 10 SOLUTION ORAL at 22:09

## 2024-07-24 RX ADMIN — Medication 20 MG: at 09:15

## 2024-07-24 RX ADMIN — ACETYLCYSTEINE 2 ML: 200 SOLUTION ORAL; RESPIRATORY (INHALATION) at 11:23

## 2024-07-24 ASSESSMENT — ACTIVITIES OF DAILY LIVING (ADL)
ADLS_ACUITY_SCORE: 73
ADLS_ACUITY_SCORE: 63
ADLS_ACUITY_SCORE: 63
ADLS_ACUITY_SCORE: 59
ADLS_ACUITY_SCORE: 73
ADLS_ACUITY_SCORE: 63
ADLS_ACUITY_SCORE: 63
ADLS_ACUITY_SCORE: 73
ADLS_ACUITY_SCORE: 59
ADLS_ACUITY_SCORE: 63
DEPENDENT_IADLS:: CLEANING;COOKING;LAUNDRY;SHOPPING;MEAL PREPARATION;MEDICATION MANAGEMENT;MONEY MANAGEMENT;TRANSPORTATION;INCONTINENCE
ADLS_ACUITY_SCORE: 63
ADLS_ACUITY_SCORE: 73
ADLS_ACUITY_SCORE: 59
ADLS_ACUITY_SCORE: 73

## 2024-07-24 NOTE — SIGNIFICANT EVENT
Called RRT due to patient being hypotensive at 73/37- even after a 500 ml bolus of NS.     NP came, assessed patient, ordered labs and 1L of NS bolus.    At the end of RRT- patient was no longer hypotensive with BP at 89/48.

## 2024-07-24 NOTE — PLAN OF CARE
DATE & TIME: 7/23-07/24/24 Night shift   Cognitive Concerns/ Orientation : HECTOR orientation, pt somnolent this shift- MD aware   BEHAVIOR & AGGRESSION TOOL COLOR: GREEN   ABNL VS/O2: VSS on RA  MOBILITY: total care/bedbound. T&R q2h  PAIN MANAGMENT: nonverbal indicators of pain absent  DIET: NPO no exceptions, normally on tube feeding. Nutrition consulted, no orders for tonight. IVF running  BOWEL/BLADDER: incontinent of B&B, external cath; no BM  ABNL LAB/BG: WBC 13.1  DRAIN/DEVICES: PIV infusing NS at 75mL/hr  TELEMETRY RHYTHM: n/a  SKIN: WDL ex blanchable redness on coccyx, mepilex in place.  TESTS/PROCEDURES: nothing scheduled  D/C DATE: Newly admitted last evening- was hypotensive overnight; called RRT. After IV boluses; BP normalized. Patient remains somnolent but arousable if shook.   Goal Outcome Evaluation:

## 2024-07-24 NOTE — PROVIDER NOTIFICATION
Notified Dr. Valenzuela that patient was hypotensive at 83/47. MD ordered 500 ml bolus of NS. Will recheck BP afterwards.
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
## 2024-07-24 NOTE — PLAN OF CARE
Goal Outcome Evaluation:  DATE & TIME: 07/24/24 0751-1324  Cognitive Concerns/ Orientation : Awake and responding at baseline this shift. Improvement from last night.   BEHAVIOR & AGGRESSION TOOL COLOR: GREEN   ABNL VS/O2: VSS on RA, soft BP  MOBILITY: total care/bedbound. T&R q2h  PAIN MANAGMENT: nonverbal indicators of pain absent  DIET: NPO no exceptions, TF at 15ml/hr since 12pm. To be increased by 15ml/her every 8 hours until 60ml/hr  BOWEL/BLADDER: incontinent of B&B  ABNL LAB/BG: WBC 8.6  DRAIN/DEVICES: PIV infusing NS at 75mL/hr  TELEMETRY RHYTHM: n/a  SKIN: WDL ex blanchable redness on coccyx, mepilex in place.  TESTS/PROCEDURES: nothing scheduled  D/C DATE: Newly admitted last evening- was hypotensive overnight; called RRT. After IV boluses; BP normalized. Still soft BP.

## 2024-07-24 NOTE — PROVIDER NOTIFICATION
RECEIVING UNIT ED HANDOFF REVIEW    ED Nurse Handoff Report was reviewed by: Stefania Drew RN on July 23, 2024 at 7:32 PM

## 2024-07-24 NOTE — PLAN OF CARE
DATE & TIME: 7/23/24 2000-2330    Cognitive Concerns/ Orientation : HECTOR orientation, pt is alert and responsive   BEHAVIOR & AGGRESSION TOOL COLOR: green   ABNL VS/O2: VSS on 2L, BP soft  MOBILITY: total care/bedbound. T&R q2h  PAIN MANAGMENT: nonverbal indicators of pain absent  DIET: NPO no exceptions, normally on tube feeding. Nutrition consulted, no orders for tonight. IVF running  BOWEL/BLADDER: incontinent of B&B, external cath   ABNL LAB/BG: WBC 13.1  DRAIN/DEVICES: PIV infusing NS at 75mL/hr  TELEMETRY RHYTHM: n/a  SKIN: WDL ex blanchable redness on coccyx, mepilex in place.  TESTS/PROCEDURES: nothing scheduled  D/C DATE: pending

## 2024-07-24 NOTE — CONSULTS
Care Management Initial Consult    General Information  Assessment completed with: Parents, Patient, VM-chart review, patient and mother Savannah at bedside  Type of CM/SW Visit: Initial Assessment    Primary Care Provider verified and updated as needed: No   Readmission within the last 30 days: previous discharge plan unsuccessful   Return Category: Exacerbation of disease     Advance Care Planning: Advance Care Planning Reviewed: present on chart, no concerns identified          Communication Assessment  Patient's communication style: spoken language (English or Bilingual) (aphasia)    Hearing Difficulty or Deaf: no   Wear Glasses or Blind: no    Cognitive  Cognitive/Neuro/Behavioral: .WDL except  Level of Consciousness: alert  Arousal Level: arouses to voice  Orientation: other (see comments) (HECTOR)  Mood/Behavior: behavior appropriate to situation, calm, cooperative  Best Language: 3 - Mute  Speech: unable to speak    Living Environment:   People in home: parent(s), other (see comments)  Mother UZIEL Nuñez  Current living Arrangements: house      Able to return to prior arrangements: yes       Family/Social Support:  Care provided by: homecare agency, other (see comments) (has PCA's)  Provides care for: no one, unable/limited ability to care for self  Marital Status: Single             Description of Support System:           Current Resources:   Patient receiving home care services: Yes  Skilled Home Care Services: Skilled Nursing, Physical Therapy, Speech Therapy (with [facility] - has not started yet)  Community Resources: [facility], [facility] Programs  Equipment currently used at home: hospital bed, lift device, wheelchair, manual  Supplies currently used at home:      Employment/Financial:  Employment Status: disabled        Financial Concerns: none           Does the patient's insurance plan have a 3 day qualifying hospital stay waiver?  Yes     Which insurance plan 3 day waiver is available? [employer]    Will the  waiver be used for post-acute placement? No    Lifestyle & Psychosocial Needs:  Social Determinants of Health     Food Insecurity: Not on file   Depression: Not at risk (4/1/2024)    PHQ-2     PHQ-2 Score: 1   Housing Stability: Not on file   Tobacco Use: Medium Risk (7/3/2024)    Patient History     Smoking Tobacco Use: Former     Smokeless Tobacco Use: Never     Passive Exposure: Not on file   Financial Resource Strain: Not on file   Alcohol Use: Not on file   Transportation Needs: Not on file   Physical Activity: Not on file   Interpersonal Safety: Not on file   Stress: Not on file   Social Connections: Not on file   Health Literacy: Not on file       Functional Status:  Prior to admission patient needed assistance:   Dependent ADLs:: Bathing, Dressing, Eating, Grooming, Incontinence, Positioning, Transfers, Wheelchair-with assist, Toileting  Dependent IADLs:: Cleaning, Cooking, Laundry, Shopping, Meal Preparation, Medication Management, Money Management, Transportation, Incontinence       Mental Health Status:          Chemical Dependency Status:                Values/Beliefs:  Spiritual, Cultural Beliefs, Hindu Practices, Values that affect care: no               Additional Information:  Met with patient and Mother Savannah at bedside; explained role in discharge planning; have met many times in the past.    Patient was admitted with fever and increased mucous production; being treated for pneumonia.  He is much improved and may anticipate his discharge in 1-2 days.  He has frequent admissions to the hospital for aspiration pneumonia.    He lives with his mother in her home.  She continues to take good care of him with the help of PCA's.  He currently has a PCA during the night between 12-8am.  He also has a PCA on Sat-Sun-Mon-Tues for 5 hours.  Savannah has hired an additional PCA for 8 hours on [id number] which has not started yet.  In addition she has home care services with [facility] for  "SN, PT and SLP.  . [employer]: [phone].  Writer let [facility], [facility] know he was in the hospital.  They will require \"resume home care\" orders.  They can pull orders from Epic and would appreciate a call with discharge date.    Per prior consults, Patients  is Bisi and her number is [phone]     Keyon requires total cares.  He has a hospital bed, lift and wheelchair.  Savannah manages his jejunal tube feedings.      He will require a stretcher ride at discharge.    Savannah requests the physician write Rx's for nebs. She states she has a working nebulizer machine for Keyon but no medication.  She feels that will help with his mucous management.  Left a sticky note for hospitalist.    Care management will continue to follow for discharge needs.    Pilar Benavides RN  Inpatient Float Care Coordinator  [facility]          "

## 2024-07-24 NOTE — PROGRESS NOTES
Admission    Patient arrives to room 629 via cart from ED.  Care plan note: HECTOR pt orientation as he is nonverbal, alert and responsive upon arrival. No belongings remain with patient. Mother at bedside upon transfer, will be back tomorrow.     Inpatient nursing criteria listed below were met:    Did you put disposition on whiteboard and in sticky note: No  Full skin assessment done (add LDA if skin issue present). Initials of 2nd RN :Yes DC  Isolation education started/completed Yes  Patient allergies verified with patient: No  Fall Risk? (Care plan updated, Education given and documented) Yes  Primary Care Plan initiated: Yes  Home medications documented in belongings flowsheet: NA  Patient belongings documented in belongings flowsheet: NA  Reminder note (belongings/ medications) placed in discharge instructions:NA  Admission profile/ required documentation complete: Yes  If patient is a 72 hour hold/Commitment are belongings removed from room and locked up? NA

## 2024-07-24 NOTE — PLAN OF CARE
Goal Outcome Evaluation: Progressing    7/23/24, Fever, UTI  7/24/24, 3 p to 7 p    Orientation: Can make gestures, non verbal, follows commands    Vitals/Tele: VSS on Ra, appears to be comfortable    IV Access/drains:R PIV infusing, L IV  SL    Diet: NPO,. On tube feeding, goal rate at 60, flushes Q4 120 ml    Mobility: WC bound    GI/: Incontinent    Wound/Skin: Redness blanchable on coccyx , mepilex in use    Consults: Hospitalist following    Discharge Plan: Pending clinical improvement      See Flow sheets for assessment

## 2024-07-24 NOTE — CONSULTS
CLINICAL NUTRITION SERVICES  -  ASSESSMENT NOTE    Recommendations Ordered by Registered Dietitian (RD):     TF initiation today ~  Type of Feeding Tube: GJ (feeding through J-port)  Enteral Frequency: Continuous  Enteral Regimen: Jevity 1.5 @ 60 mL/hr x 22 hours (hold for 1 hr before/after levothyroxine dose)   --> when FT ok for use. Start @ 15 mL/hr and advance by 15 mL q 8 hours to goal rate.   Total Enteral Provisions: 1320 mL, 1980 kcals (28 kcal/kg), 85 g PRO (1.2 g/kg), 1003 ml free H2O, and 28 g fiber daily.   Free Water Flush: 120 mL q 4 hours for tube patency (720 mL daily)  Total Free Water Provisions: 1723 mL daily    - Ordered current weight  - Ordered Phos and Mg labs     Malnutrition:   % Weight Loss:  Weight loss does not meet criteria for malnutrition  % Intake:  No decreased intake noted (on TF)  Subcutaneous Fat Loss:  Chronically low baseline stores   Muscle Loss:  Chronically low baseline stores   Fluid Retention:  trace    Malnutrition Diagnosis: Patient does not meet two of the above criteria necessary for diagnosing malnutrition     REASON FOR ASSESSMENT  Keyon Farias is a 63 year old male seen by Registered Dietitian for Provider Order - Registered Dietitian to Assess and Order TF per Medical Nutrition Therapy Protocol.    PMH of TBI, chronic hemiplegia, chronic dysphagia, seizure disorder, among others. Admitted d/t likely early sepsis from recurrent aspiration pneumonia.    NUTRITION HISTORY    - Patient well known to nutrition services. Chronically on tube feeding - has a GJ tube (feedings through J-port).  - Last admit patient was earlier this month in which he was on Jevity 1.5 @ 60 mL/hr x 22 hours + 1 bottle Expedite.    CURRENT NUTRITION ORDERS  Diet Order: NPO       NUTRITION FOCUSED PHYSICAL ASSESSMENT FOR DIAGNOSING MALNUTRITION)  No - deferred, just here within the past two weeks in which a physical exam was completed             Observed:    Chronic low fat/muscle stores (per  "previous visits)     Obtained from Chart/Interdisciplinary Team:  Non-verbal     ANTHROPOMETRICS  Height: 5'10\"  Weight: 69.4 kg (153 lb)  as of 7/12/2024  BMI 22  Weight Status:  Normal BMI  IBW: 75.5 kg  % IBW: 92%  Weight History: down slightly since 2023 -- 7.8% wt loss in 7 months.  Wt Readings from Last 25 Encounters:   07/12/24 69.4 kg (153 lb)   06/04/24 74.8 kg (165 lb)   05/05/24 75.1 kg (165 lb 9.1 oz)   04/20/24 71.2 kg (156 lb 15.6 oz)   03/25/24 68.9 kg (151 lb 14.4 oz)   01/26/24 68.1 kg (150 lb 2.1 oz)   01/11/24 68.5 kg (151 lb)   12/30/23 68.5 kg (151 lb 0.2 oz)   12/21/23 75.3 kg (166 lb 0.1 oz)   12/01/23 69.8 kg (153 lb 14.1 oz)   11/26/23 74.8 kg (165 lb)   11/07/23 74.8 kg (165 lb)   10/26/23 70.8 kg (156 lb 1.4 oz)   10/04/23 83.9 kg (185 lb)   10/03/23 84.2 kg (185 lb 10 oz)   09/21/23 77.1 kg (170 lb)   09/12/23 76.8 kg (169 lb 5 oz)   08/12/23 78.4 kg (172 lb 13.5 oz)   05/10/23 72.6 kg (160 lb)   04/06/23 79.4 kg (175 lb)   03/10/23 73.9 kg (163 lb)   02/15/23 73.9 kg (163 lb)   01/23/23 63.5 kg (140 lb)   01/09/23 63.5 kg (140 lb)   11/28/22 64.4 kg (141 lb 14.4 oz)     LABS  -148 (H)    MEDICATIONS  Vitamin B12, levothyroxine, reglan, miralax, sodium chloride tablet BID, IVF @ 75 mL/hr    ASSESSED NUTRITION NEEDS PER APPROVED PRACTICE GUIDELINES:  Dosing Weight 69.4 kg  Estimated Energy Needs: 5781-7758 kcals (25-30 Kcal/Kg)  Justification: maintenance  Estimated Protein Needs:  grams protein (1.2-1.5 g pro/Kg)  Justification: preservation of lean body mass  Estimated Fluid Needs: 9284-5963 mL (1 mL/Kcal or per MD)  Justification: maintenance    NUTRITION DIAGNOSIS:  Inadequate oral intake related to NPO status as evidenced by need for EN support to meet nutritional needs     NUTRITION INTERVENTIONS  Recommendations / Nutrition Prescription  See above    Implementation  Nutrition education: Not appropriate at this time due to patient condition  EN Composition, EN Schedule, " and Feeding Tube Flush - ordered    Nutrition Goals  EN to meet % estimated needs at goal     MONITORING AND EVALUATION:  Progress towards goals will be monitored and evaluated per protocol and Practice Guidelines    Sun Abad RD, LD  Clinical Dietitian - [facility]

## 2024-07-25 ENCOUNTER — MEDICAL CORRESPONDENCE (OUTPATIENT)
Dept: HEALTH INFORMATION MANAGEMENT | Facility: CLINIC | Age: 64
End: 2024-07-25

## 2024-07-25 LAB — GLUCOSE BLDC GLUCOMTR-MCNC: 144 MG/DL (ref 70–99)

## 2024-07-25 PROCEDURE — 250N000011 HC RX IP 250 OP 636: Performed by: HOSPITALIST

## 2024-07-25 PROCEDURE — 120N000001 HC R&B MED SURG/OB

## 2024-07-25 PROCEDURE — 99232 SBSQ HOSP IP/OBS MODERATE 35: CPT | Performed by: HOSPITALIST

## 2024-07-25 PROCEDURE — 94640 AIRWAY INHALATION TREATMENT: CPT

## 2024-07-25 PROCEDURE — 258N000003 HC RX IP 258 OP 636: Performed by: HOSPITALIST

## 2024-07-25 PROCEDURE — 250N000013 HC RX MED GY IP 250 OP 250 PS 637: Performed by: HOSPITALIST

## 2024-07-25 PROCEDURE — 250N000009 HC RX 250: Performed by: HOSPITALIST

## 2024-07-25 PROCEDURE — 999N000157 HC STATISTIC RCP TIME EA 10 MIN

## 2024-07-25 PROCEDURE — 94640 AIRWAY INHALATION TREATMENT: CPT | Mod: 76

## 2024-07-25 RX ADMIN — SODIUM CHLORIDE: 9 INJECTION, SOLUTION INTRAVENOUS at 09:25

## 2024-07-25 RX ADMIN — BRIVARACETAM 100 MG: 10 SOLUTION ORAL at 21:11

## 2024-07-25 RX ADMIN — CARBAMAZEPINE 150 MG: 100 SUSPENSION ORAL at 00:20

## 2024-07-25 RX ADMIN — PIPERACILLIN AND TAZOBACTAM 3.38 G: 3; .375 INJECTION, POWDER, FOR SOLUTION INTRAVENOUS at 00:20

## 2024-07-25 RX ADMIN — ALBUTEROL SULFATE 2.5 MG: 2.5 SOLUTION RESPIRATORY (INHALATION) at 07:11

## 2024-07-25 RX ADMIN — ALBUTEROL SULFATE 2.5 MG: 2.5 SOLUTION RESPIRATORY (INHALATION) at 15:11

## 2024-07-25 RX ADMIN — PIPERACILLIN AND TAZOBACTAM 3.38 G: 3; .375 INJECTION, POWDER, FOR SOLUTION INTRAVENOUS at 11:37

## 2024-07-25 RX ADMIN — BRIVARACETAM 100 MG: 10 SOLUTION ORAL at 11:46

## 2024-07-25 RX ADMIN — HYDROCORTISONE SODIUM SUCCINATE 50 MG: 100 INJECTION, POWDER, FOR SOLUTION INTRAMUSCULAR; INTRAVENOUS at 21:04

## 2024-07-25 RX ADMIN — ALBUTEROL SULFATE 2.5 MG: 2.5 SOLUTION RESPIRATORY (INHALATION) at 10:46

## 2024-07-25 RX ADMIN — PIPERACILLIN AND TAZOBACTAM 3.38 G: 3; .375 INJECTION, POWDER, FOR SOLUTION INTRAVENOUS at 22:36

## 2024-07-25 RX ADMIN — HYDROCORTISONE SODIUM SUCCINATE 50 MG: 100 INJECTION, POWDER, FOR SOLUTION INTRAMUSCULAR; INTRAVENOUS at 09:57

## 2024-07-25 RX ADMIN — PIPERACILLIN AND TAZOBACTAM 3.38 G: 3; .375 INJECTION, POWDER, FOR SOLUTION INTRAVENOUS at 05:18

## 2024-07-25 RX ADMIN — Medication 20 MG: at 09:57

## 2024-07-25 RX ADMIN — SODIUM CHLORIDE TAB 1 GM 1 G: 1 TAB at 21:04

## 2024-07-25 RX ADMIN — SODIUM CHLORIDE TAB 1 GM 1 G: 1 TAB at 09:57

## 2024-07-25 RX ADMIN — ACETYLCYSTEINE 2 ML: 200 SOLUTION ORAL; RESPIRATORY (INHALATION) at 19:08

## 2024-07-25 RX ADMIN — ALBUTEROL SULFATE 2.5 MG: 2.5 SOLUTION RESPIRATORY (INHALATION) at 19:08

## 2024-07-25 RX ADMIN — CARBAMAZEPINE 150 MG: 100 SUSPENSION ORAL at 11:37

## 2024-07-25 RX ADMIN — LEVOTHYROXINE SODIUM 112 MCG: 112 TABLET ORAL at 09:16

## 2024-07-25 RX ADMIN — METOCLOPRAMIDE HYDROCHLORIDE 10 MG: 5 SOLUTION ORAL at 22:36

## 2024-07-25 RX ADMIN — ENOXAPARIN SODIUM 40 MG: 40 INJECTION SUBCUTANEOUS at 21:05

## 2024-07-25 RX ADMIN — ACETYLCYSTEINE 2 ML: 200 SOLUTION ORAL; RESPIRATORY (INHALATION) at 10:46

## 2024-07-25 RX ADMIN — POTASSIUM & SODIUM PHOSPHATES POWDER PACK 280-160-250 MG 1 PACKET: 280-160-250 PACK at 09:57

## 2024-07-25 RX ADMIN — ACETYLCYSTEINE 2 ML: 200 SOLUTION ORAL; RESPIRATORY (INHALATION) at 15:11

## 2024-07-25 RX ADMIN — CARBAMAZEPINE 150 MG: 100 SUSPENSION ORAL at 05:18

## 2024-07-25 RX ADMIN — SODIUM CHLORIDE: 9 INJECTION, SOLUTION INTRAVENOUS at 00:18

## 2024-07-25 RX ADMIN — PIPERACILLIN AND TAZOBACTAM 3.38 G: 3; .375 INJECTION, POWDER, FOR SOLUTION INTRAVENOUS at 17:47

## 2024-07-25 RX ADMIN — CARBAMAZEPINE 100 MG: 100 SUSPENSION ORAL at 17:47

## 2024-07-25 RX ADMIN — METOCLOPRAMIDE HYDROCHLORIDE 10 MG: 5 SOLUTION ORAL at 13:46

## 2024-07-25 RX ADMIN — ACETYLCYSTEINE 2 ML: 200 SOLUTION ORAL; RESPIRATORY (INHALATION) at 07:11

## 2024-07-25 RX ADMIN — CYANOCOBALAMIN TAB 1000 MCG 1000 MCG: 1000 TAB at 09:57

## 2024-07-25 ASSESSMENT — ACTIVITIES OF DAILY LIVING (ADL)
ADLS_ACUITY_SCORE: 73
ADLS_ACUITY_SCORE: 69
ADLS_ACUITY_SCORE: 73
ADLS_ACUITY_SCORE: 69
ADLS_ACUITY_SCORE: 69
ADLS_ACUITY_SCORE: 73
ADLS_ACUITY_SCORE: 69

## 2024-07-25 NOTE — PROVIDER NOTIFICATION
MD Notification    Notified Person: MD    Notified Person Name: Tremayne Leroy     Notification Date/Time: 7/24/24 20:47    Notification Interaction:  [employer] messaging    Purpose of Notification: patient's blood pressure 88/45    Orders Received: will give 500 ml bonus     Comments:

## 2024-07-25 NOTE — PLAN OF CARE
Goal Outcome Evaluation:    Patient is alert but unable to assess orientation; nonverbal. Opens eyes spontaneously. VSS ex soft BP - paged out to provider and 500 ml bolus given. Total care/bedbound. Q2 Turn and reposition. NPO no exceptions. TF at 30ml /hr with Q4 water flushed - goal 60ml/hr. Incontinent of bladder and bowel. Nonverbal indicators of pain absent. PIV infusing NS @ 75ml/hr. Redness blanchable on coccyx; mepi in place. Tele: NSLE
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
## 2024-07-26 DIAGNOSIS — J69.0 ASPIRATION PNEUMONITIS (H): ICD-10-CM

## 2024-07-26 LAB
ANION GAP SERPL CALCULATED.3IONS-SCNC: 8 MMOL/L (ref 7–15)
BACTERIA UR CULT: ABNORMAL
BACTERIA UR CULT: ABNORMAL
BASOPHILS # BLD AUTO: 0 10E3/UL (ref 0–0.2)
BASOPHILS NFR BLD AUTO: 1 %
BUN SERPL-MCNC: 8.3 MG/DL (ref 8–23)
CALCIUM SERPL-MCNC: 7 MG/DL (ref 8.8–10.4)
CHLORIDE SERPL-SCNC: 110 MMOL/L (ref 98–107)
CREAT SERPL-MCNC: 0.78 MG/DL (ref 0.67–1.17)
EGFRCR SERPLBLD CKD-EPI 2021: >90 ML/MIN/1.73M2
EOSINOPHIL # BLD AUTO: 0.1 10E3/UL (ref 0–0.7)
EOSINOPHIL NFR BLD AUTO: 1 %
ERYTHROCYTE [DISTWIDTH] IN BLOOD BY AUTOMATED COUNT: 16 % (ref 10–15)
GLUCOSE SERPL-MCNC: 150 MG/DL (ref 70–99)
HCO3 SERPL-SCNC: 26 MMOL/L (ref 22–29)
HCT VFR BLD AUTO: 34.7 % (ref 40–53)
HGB BLD-MCNC: 11.1 G/DL (ref 13.3–17.7)
IMM GRANULOCYTES # BLD: 0 10E3/UL
IMM GRANULOCYTES NFR BLD: 0 %
LYMPHOCYTES # BLD AUTO: 0.9 10E3/UL (ref 0.8–5.3)
LYMPHOCYTES NFR BLD AUTO: 16 %
MCH RBC QN AUTO: 28.4 PG (ref 26.5–33)
MCHC RBC AUTO-ENTMCNC: 32 G/DL (ref 31.5–36.5)
MCV RBC AUTO: 89 FL (ref 78–100)
MONOCYTES # BLD AUTO: 0.3 10E3/UL (ref 0–1.3)
MONOCYTES NFR BLD AUTO: 6 %
NEUTROPHILS # BLD AUTO: 4 10E3/UL (ref 1.6–8.3)
NEUTROPHILS NFR BLD AUTO: 75 %
NRBC # BLD AUTO: 0 10E3/UL
NRBC BLD AUTO-RTO: 0 /100
PLATELET # BLD AUTO: 184 10E3/UL (ref 150–450)
POTASSIUM SERPL-SCNC: 3.6 MMOL/L (ref 3.4–5.3)
RBC # BLD AUTO: 3.91 10E6/UL (ref 4.4–5.9)
SODIUM SERPL-SCNC: 144 MMOL/L (ref 135–145)
WBC # BLD AUTO: 5.3 10E3/UL (ref 4–11)

## 2024-07-26 PROCEDURE — 99232 SBSQ HOSP IP/OBS MODERATE 35: CPT | Performed by: HOSPITALIST

## 2024-07-26 PROCEDURE — 94640 AIRWAY INHALATION TREATMENT: CPT

## 2024-07-26 PROCEDURE — 94640 AIRWAY INHALATION TREATMENT: CPT | Mod: 76

## 2024-07-26 PROCEDURE — 85004 AUTOMATED DIFF WBC COUNT: CPT | Performed by: HOSPITALIST

## 2024-07-26 PROCEDURE — 258N000003 HC RX IP 258 OP 636: Performed by: HOSPITALIST

## 2024-07-26 PROCEDURE — 999N000128 HC STATISTIC PERIPHERAL IV START W/O US GUIDANCE

## 2024-07-26 PROCEDURE — 999N000157 HC STATISTIC RCP TIME EA 10 MIN

## 2024-07-26 PROCEDURE — 36415 COLL VENOUS BLD VENIPUNCTURE: CPT | Performed by: HOSPITALIST

## 2024-07-26 PROCEDURE — 80048 BASIC METABOLIC PNL TOTAL CA: CPT | Performed by: HOSPITALIST

## 2024-07-26 PROCEDURE — 250N000013 HC RX MED GY IP 250 OP 250 PS 637: Performed by: HOSPITALIST

## 2024-07-26 PROCEDURE — 250N000011 HC RX IP 250 OP 636: Performed by: HOSPITALIST

## 2024-07-26 PROCEDURE — 120N000001 HC R&B MED SURG/OB

## 2024-07-26 PROCEDURE — 250N000009 HC RX 250: Performed by: HOSPITALIST

## 2024-07-26 RX ADMIN — METOCLOPRAMIDE HYDROCHLORIDE 10 MG: 5 SOLUTION ORAL at 23:08

## 2024-07-26 RX ADMIN — ALBUTEROL SULFATE 2.5 MG: 2.5 SOLUTION RESPIRATORY (INHALATION) at 15:30

## 2024-07-26 RX ADMIN — ALBUTEROL SULFATE 2.5 MG: 2.5 SOLUTION RESPIRATORY (INHALATION) at 19:32

## 2024-07-26 RX ADMIN — PIPERACILLIN AND TAZOBACTAM 3.38 G: 3; .375 INJECTION, POWDER, FOR SOLUTION INTRAVENOUS at 04:59

## 2024-07-26 RX ADMIN — HYDROCORTISONE 7.5 MG: 5 TABLET ORAL at 13:25

## 2024-07-26 RX ADMIN — LEVOTHYROXINE SODIUM 112 MCG: 112 TABLET ORAL at 09:36

## 2024-07-26 RX ADMIN — Medication 20 MG: at 09:39

## 2024-07-26 RX ADMIN — ACETYLCYSTEINE 2 ML: 200 SOLUTION ORAL; RESPIRATORY (INHALATION) at 10:49

## 2024-07-26 RX ADMIN — SODIUM CHLORIDE: 9 INJECTION, SOLUTION INTRAVENOUS at 04:20

## 2024-07-26 RX ADMIN — PIPERACILLIN AND TAZOBACTAM 3.38 G: 3; .375 INJECTION, POWDER, FOR SOLUTION INTRAVENOUS at 11:28

## 2024-07-26 RX ADMIN — ACETYLCYSTEINE 2 ML: 200 SOLUTION ORAL; RESPIRATORY (INHALATION) at 19:32

## 2024-07-26 RX ADMIN — PIPERACILLIN AND TAZOBACTAM 3.38 G: 3; .375 INJECTION, POWDER, FOR SOLUTION INTRAVENOUS at 23:12

## 2024-07-26 RX ADMIN — CARBAMAZEPINE 150 MG: 100 SUSPENSION ORAL at 05:50

## 2024-07-26 RX ADMIN — CARBAMAZEPINE 150 MG: 100 SUSPENSION ORAL at 11:39

## 2024-07-26 RX ADMIN — POLYETHYLENE GLYCOL 3350 17 G: 17 POWDER, FOR SOLUTION ORAL at 09:39

## 2024-07-26 RX ADMIN — ENOXAPARIN SODIUM 40 MG: 40 INJECTION SUBCUTANEOUS at 21:54

## 2024-07-26 RX ADMIN — ACETYLCYSTEINE 2 ML: 200 SOLUTION ORAL; RESPIRATORY (INHALATION) at 07:21

## 2024-07-26 RX ADMIN — BRIVARACETAM 100 MG: 10 SOLUTION ORAL at 09:39

## 2024-07-26 RX ADMIN — METOCLOPRAMIDE HYDROCHLORIDE 10 MG: 5 SOLUTION ORAL at 13:23

## 2024-07-26 RX ADMIN — CARBAMAZEPINE 150 MG: 100 SUSPENSION ORAL at 00:28

## 2024-07-26 RX ADMIN — CYANOCOBALAMIN TAB 1000 MCG 1000 MCG: 1000 TAB at 09:36

## 2024-07-26 RX ADMIN — POTASSIUM & SODIUM PHOSPHATES POWDER PACK 280-160-250 MG 1 PACKET: 280-160-250 PACK at 09:39

## 2024-07-26 RX ADMIN — SODIUM CHLORIDE TAB 1 GM 1 G: 1 TAB at 21:54

## 2024-07-26 RX ADMIN — CARBAMAZEPINE 100 MG: 100 SUSPENSION ORAL at 18:28

## 2024-07-26 RX ADMIN — ALBUTEROL SULFATE 2.5 MG: 2.5 SOLUTION RESPIRATORY (INHALATION) at 07:21

## 2024-07-26 RX ADMIN — SODIUM CHLORIDE TAB 1 GM 1 G: 1 TAB at 11:39

## 2024-07-26 RX ADMIN — ALBUTEROL SULFATE 2.5 MG: 2.5 SOLUTION RESPIRATORY (INHALATION) at 10:48

## 2024-07-26 RX ADMIN — ACETYLCYSTEINE 2 ML: 200 SOLUTION ORAL; RESPIRATORY (INHALATION) at 15:30

## 2024-07-26 RX ADMIN — BRIVARACETAM 100 MG: 10 SOLUTION ORAL at 21:54

## 2024-07-26 RX ADMIN — HYDROCORTISONE 15 MG: 10 TABLET ORAL at 09:36

## 2024-07-26 ASSESSMENT — ACTIVITIES OF DAILY LIVING (ADL)
ADLS_ACUITY_SCORE: 73
ADLS_ACUITY_SCORE: 73
ADLS_ACUITY_SCORE: 75
ADLS_ACUITY_SCORE: 75
ADLS_ACUITY_SCORE: 73
ADLS_ACUITY_SCORE: 75
ADLS_ACUITY_SCORE: 73
ADLS_ACUITY_SCORE: 73
ADLS_ACUITY_SCORE: 75
ADLS_ACUITY_SCORE: 75
ADLS_ACUITY_SCORE: 73
ADLS_ACUITY_SCORE: 75
ADLS_ACUITY_SCORE: 73
ADLS_ACUITY_SCORE: 75
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
## 2024-07-26 NOTE — PLAN OF CARE
Goal Outcome Evaluation:      Goal Outcome Evaluation:  DATE & TIME: 07/25/24 9074-1720  Cognitive Concerns/ Orientation : Awake and responding at baseline this shift. Improvement from last night.   BEHAVIOR & AGGRESSION TOOL COLOR: GREEN          ABNL VS/O2: VSS on RA, soft BP  MOBILITY: total care/bedbound. T&R q2h  PAIN MANAGMENT: nonverbal indicators of pain absent  DIET: NPO no exceptions, TF @ 60ml/hr. Tolerating well.   BOWEL/BLADDER: incontinent of B&B. One large loose BM this shift.   ABNL LAB/BG: No labs   DRAIN/DEVICES: PIV infusing NS at 75mL/hr  TELEMETRY RHYTHM: n/a  SKIN: WDL ex blanchable redness on coccyx, mepilex in place.  TESTS/PROCEDURES: nothing scheduled  D/C DATE: Possible discharge to home tomorrow. Pt at baseline. Pt calm and comfortable this shift.

## 2024-07-26 NOTE — PLAN OF CARE
Goal Outcome Evaluation:  DATE & TIME: 07/26/24 AM shift   Cognitive Concerns/ Orientation : A/O to self, incoherent sounds.   BEHAVIOR & AGGRESSION TOOL COLOR: GREEN          ABNL VS/O2: VSS on RA  MOBILITY: total care/bedbound. T&R q2h  PAIN MANAGMENT: appears comfortable; shakes head when asked if anything hurts  DIET: NPO no exceptions, oral cares completed. TF @ 60ml/hr.  ml/4hr. Tolerating well.   BOWEL/BLADDER: incontinent of B&B. BM x 2.  ABNL LAB/BG: awaiting urine sensitivities   DRAIN/DEVICES: PIV SL  TELEMETRY RHYTHM: n/a  SKIN: WDL ex blanchable redness on coccyx, mepilex in place. Scattered scabs. Frequently wet due to incontinence, needs to be changed frequently  TESTS/PROCEDURES:n/a  D/C DATE: Possible discharge to home tomorrow once urine sensitivities resolved.

## 2024-07-27 VITALS
SYSTOLIC BLOOD PRESSURE: 121 MMHG | HEART RATE: 67 BPM | TEMPERATURE: 97.2 F | RESPIRATION RATE: 18 BRPM | OXYGEN SATURATION: 97 % | WEIGHT: 149.69 LBS | DIASTOLIC BLOOD PRESSURE: 52 MMHG | BODY MASS INDEX: 21.48 KG/M2

## 2024-07-27 PROCEDURE — 99239 HOSP IP/OBS DSCHRG MGMT >30: CPT | Performed by: HOSPITALIST

## 2024-07-27 PROCEDURE — 250N000011 HC RX IP 250 OP 636: Performed by: HOSPITALIST

## 2024-07-27 PROCEDURE — 250N000013 HC RX MED GY IP 250 OP 250 PS 637: Performed by: HOSPITALIST

## 2024-07-27 PROCEDURE — 94640 AIRWAY INHALATION TREATMENT: CPT

## 2024-07-27 PROCEDURE — 250N000009 HC RX 250: Performed by: HOSPITALIST

## 2024-07-27 PROCEDURE — 999N000157 HC STATISTIC RCP TIME EA 10 MIN

## 2024-07-27 RX ORDER — ACETYLCYSTEINE 200 MG/ML
2 SOLUTION ORAL; RESPIRATORY (INHALATION) 4 TIMES DAILY
Qty: 10 ML | Refills: 0 | Status: SHIPPED | OUTPATIENT
Start: 2024-07-27 | End: 2024-07-27

## 2024-07-27 RX ORDER — SULFAMETHOXAZOLE/TRIMETHOPRIM 800-160 MG
1 TABLET ORAL 2 TIMES DAILY
Qty: 10 TABLET | Refills: 0 | Status: SHIPPED | OUTPATIENT
Start: 2024-07-27 | End: 2024-08-01

## 2024-07-27 RX ORDER — SENNOSIDES 8.6 MG
1 TABLET ORAL 2 TIMES DAILY PRN
Status: SHIPPED
Start: 2024-07-27

## 2024-07-27 RX ORDER — ALBUTEROL SULFATE 5 MG/ML
2.5 SOLUTION RESPIRATORY (INHALATION) EVERY 6 HOURS PRN
Qty: 240 ML | Refills: 3 | Status: SHIPPED | OUTPATIENT
Start: 2024-07-27

## 2024-07-27 RX ORDER — ALBUTEROL SULFATE 5 MG/ML
2.5 SOLUTION RESPIRATORY (INHALATION) EVERY 6 HOURS PRN
Qty: 240 ML | Refills: 3 | Status: SHIPPED | OUTPATIENT
Start: 2024-07-27 | End: 2024-07-27

## 2024-07-27 RX ORDER — ACETYLCYSTEINE 200 MG/ML
2 SOLUTION ORAL; RESPIRATORY (INHALATION) 4 TIMES DAILY
Qty: 10 ML | Refills: 0 | Status: SHIPPED | OUTPATIENT
Start: 2024-07-27

## 2024-07-27 RX ADMIN — Medication 20 MG: at 08:58

## 2024-07-27 RX ADMIN — ACETYLCYSTEINE 2 ML: 200 SOLUTION ORAL; RESPIRATORY (INHALATION) at 10:48

## 2024-07-27 RX ADMIN — BRIVARACETAM 100 MG: 10 SOLUTION ORAL at 08:55

## 2024-07-27 RX ADMIN — POTASSIUM & SODIUM PHOSPHATES POWDER PACK 280-160-250 MG 1 PACKET: 280-160-250 PACK at 09:02

## 2024-07-27 RX ADMIN — CARBAMAZEPINE 150 MG: 100 SUSPENSION ORAL at 01:00

## 2024-07-27 RX ADMIN — METOCLOPRAMIDE HYDROCHLORIDE 10 MG: 5 SOLUTION ORAL at 08:56

## 2024-07-27 RX ADMIN — CARBAMAZEPINE 150 MG: 100 SUSPENSION ORAL at 12:22

## 2024-07-27 RX ADMIN — HYDROCORTISONE 15 MG: 10 TABLET ORAL at 09:02

## 2024-07-27 RX ADMIN — SODIUM CHLORIDE TAB 1 GM 1 G: 1 TAB at 09:22

## 2024-07-27 RX ADMIN — CYANOCOBALAMIN TAB 1000 MCG 1000 MCG: 1000 TAB at 09:02

## 2024-07-27 RX ADMIN — ALBUTEROL SULFATE 2.5 MG: 2.5 SOLUTION RESPIRATORY (INHALATION) at 10:49

## 2024-07-27 RX ADMIN — PIPERACILLIN AND TAZOBACTAM 3.38 G: 3; .375 INJECTION, POWDER, FOR SOLUTION INTRAVENOUS at 05:34

## 2024-07-27 RX ADMIN — LEVOTHYROXINE SODIUM 112 MCG: 112 TABLET ORAL at 09:02

## 2024-07-27 RX ADMIN — PIPERACILLIN AND TAZOBACTAM 3.38 G: 3; .375 INJECTION, POWDER, FOR SOLUTION INTRAVENOUS at 12:22

## 2024-07-27 RX ADMIN — POLYETHYLENE GLYCOL 3350 17 G: 17 POWDER, FOR SOLUTION ORAL at 08:56

## 2024-07-27 RX ADMIN — HYDROCORTISONE 7.5 MG: 5 TABLET ORAL at 14:22

## 2024-07-27 RX ADMIN — CARBAMAZEPINE 150 MG: 100 SUSPENSION ORAL at 05:34

## 2024-07-27 ASSESSMENT — ACTIVITIES OF DAILY LIVING (ADL)
ADLS_ACUITY_SCORE: 75
ADLS_ACUITY_SCORE: 73
ADLS_ACUITY_SCORE: 75
ADLS_ACUITY_SCORE: 73
ADLS_ACUITY_SCORE: 75

## 2024-07-27 NOTE — PLAN OF CARE
Discharge    Patient discharged to home with [facility] transportation via stretcher  Care plan note    Goal Outcome Evaluation:  DATE & TIME: 07/27/24 AM shift   Cognitive Concerns/ Orientation : A/O to self, incoherent sounds. More awake and engaging.   BEHAVIOR & AGGRESSION TOOL COLOR: GREEN          ABNL VS/O2: VSS on RA  MOBILITY: total care/bedbound. T&R q2h  PAIN MANAGMENT: appears comfortable; shakes head when asked if anything hurts  DIET: NPO no exceptions, oral cares completed. TF @ 60ml/hr.  ml/4hr. Tolerating well.   BOWEL/BLADDER: incontinent of B&B. BM x 2, loose  ABNL LAB/BG: n/a  DRAIN/DEVICES: PIV SL  TELEMETRY RHYTHM: n/a  SKIN: WDL ex blanchable redness on coccyx, mepilex in place. Scattered scabs. Frequently wet due to incontinence, needs to be changed frequently  TESTS/PROCEDURES:n/a  D/C DATE: discharge home today with oral antibiotics. Nebs filles thru home pharm, mom is aware.     Listed belongings gathered and given to patient (including from security/pharmacy). Yes  Care Plan and Patient education resolved: Yes  Prescriptions if needed, hard copies sent with patient  NA  Medication Bin checked and emptied on discharge Yes  SW/care coordinator/charge RN aware of discharge: Yes

## 2024-07-27 NOTE — PROGRESS NOTES
Care Management Discharge Note    Discharge Date: 07/27/2024       Discharge Disposition:  back home today    Discharge Services:  none    Discharge DME:  none    Discharge Transportation: health plan transportation, agency  -[facility] Stretcher ride set for 2:30 pm to 3:20 pm   Private pay costs discussed: Not applicable    Does the patient's insurance plan have a 3 day qualifying hospital stay waiver?  Yes     Which insurance plan 3 day waiver is available? Alternative insurance waiver    Will the waiver be used for post-acute placement? No    PAS Confirmation Code:    Patient/family educated on Medicare website which has current facility and service quality ratings:      Education Provided on the Discharge Plan:    Persons Notified of Discharge Plans:   Patient/Family in Agreement with the Plan: yes    Handoff Referral Completed: Yes    Additional Information:  Writer called patient's mother as patient is being discharged and wanted to know what time patient's mother, Savannah would be ready. Savannah told writer she would be ready around 2:30 pm. Writer called and got a stretcher ride of 2:30 pm to 3:20 pm. Writer updated Savannah with this time. She wants to make sure he has his blue toothbrush and gets his neb solution sent with him.   Relayed to charge nurse and noted neb solution has been ordered.    Zee Stout RN

## 2024-07-27 NOTE — PLAN OF CARE
Goal Outcome Evaluation:         Goal Outcome Evaluation:  DATE & TIME: 7/26/24-7/27/24 [id number]  Cognitive Concerns/ Orientation : Alert, HECTOR. Non verbal, calm  BEHAVIOR & AGGRESSION TOOL COLOR: GREEN          ABNL VS/O2: VSS on RA  MOBILITY: Ax2, lift.  T&R q2h  PAIN MANAGMENT: nonverbal indicators of pain absent  DIET: NPO no exceptions, TF @ 60ml/hr.  ml/4hr. Tolerating well.   BOWEL/BLADDER: Incontinent of B&B. No BM overnight.  ABNL LAB/BG: urine sensitivities came back positive for Enterococcus faecalis and Staphylococcus aureus MRSA.  DRAIN/DEVICES:New PIV in left upper arm.  TELEMETRY RHYTHM: n/a  SKIN: WDL ex blanchable redness on coccyx, mepilex in place. Scattered scabs.   TESTS/PROCEDURES: None  D/C DATE: Possible discharge to home today

## 2024-07-27 NOTE — PROGRESS NOTES
Patient seen and examined    -No acute issues overnight  -urine culture growing E fecalis and MRSA; has completed about 4 days of IV Zosyn; will plan to discharge home on 5 more days of PO Augmentin and Bactrim    Discharge home today    Please see discharge summary for details

## 2024-07-27 NOTE — PROGRESS NOTES
MD Notification    Notified Person: MD    Notified Person Name: David Sanders    Notification Date/Time: 7/26/2024 8156    Notification Interaction:     Purpose of Notification: Urine culture came back positive for Enterococcus faecalis and Staphylococcus aureus.      Comments:  MD aware

## 2024-07-27 NOTE — PLAN OF CARE
Goal Outcome Evaluation:  DATE & TIME: 7/26/24 [id number]  Cognitive Concerns/ Orientation : Alert, HECTOR. Non verbal, calm  BEHAVIOR & AGGRESSION TOOL COLOR: GREEN          ABNL VS/O2: VSS on RA  MOBILITY: Ax2, lift.  T&R q2h  PAIN MANAGMENT: nonverbal indicators of pain absent  DIET: NPO no exceptions, TF @ 60ml/hr.  ml/4hr. Tolerating well.   BOWEL/BLADDER: Incontinent of B&B. No bm this evening.   ABNL LAB/BG: Awaiting urine sensitivities  DRAIN/DEVICES: IV loss around 1700- New IV placed on R upper arm.   TELEMETRY RHYTHM: n/a  SKIN: WDL ex blanchable redness on coccyx, mepilex in place. Scattered scabs.   TESTS/PROCEDURES: None  D/C DATE: Possible discharge to home tomorrow once urine sensitivities resolved. Zosyn unable to administer on time due to IV loss.
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
## 2024-07-28 LAB — BACTERIA BLD CULT: NO GROWTH

## 2024-07-29 ENCOUNTER — PATIENT OUTREACH (OUTPATIENT)
Dept: CARE COORDINATION | Facility: CLINIC | Age: 64
End: 2024-07-29
Payer: MEDICARE

## 2024-07-29 ENCOUNTER — MEDICAL CORRESPONDENCE (OUTPATIENT)
Dept: HEALTH INFORMATION MANAGEMENT | Facility: CLINIC | Age: 64
End: 2024-07-29

## 2024-07-29 ENCOUNTER — TELEPHONE (OUTPATIENT)
Dept: FAMILY MEDICINE | Facility: CLINIC | Age: 64
End: 2024-07-29
Payer: MEDICARE

## 2024-07-29 DIAGNOSIS — Z93.1 G TUBE FEEDINGS (H): Primary | ICD-10-CM

## 2024-07-29 DIAGNOSIS — Z53.9 DIAGNOSIS NOT YET DEFINED: Primary | ICD-10-CM

## 2024-07-29 DIAGNOSIS — G40.803 INTRACTABLE EPILEPSY DUE TO EXTERNAL CAUSES WITH STATUS EPILEPTICUS (H): ICD-10-CM

## 2024-07-29 DIAGNOSIS — G40.909 RECURRENT SEIZURES (H): ICD-10-CM

## 2024-07-29 PROCEDURE — G0180 MD CERTIFICATION HHA PATIENT: HCPCS | Performed by: INTERNAL MEDICINE

## 2024-07-29 RX ORDER — CARBAMAZEPINE 100 MG/5ML
SUSPENSION ORAL
Qty: 2700 ML | Refills: 1 | Status: ON HOLD | OUTPATIENT
Start: 2024-07-29 | End: 2024-09-18

## 2024-07-29 RX ORDER — METOCLOPRAMIDE HYDROCHLORIDE 5 MG/5ML
SOLUTION ORAL
Qty: 2365 ML | Refills: 5 | Status: SHIPPED | OUTPATIENT
Start: 2024-07-29

## 2024-07-29 NOTE — TELEPHONE ENCOUNTER
Left a  informing home care RN that the provider has approved orders for resumption of care skilled nursing.     Gilberto Lino RN  [facility]

## 2024-07-29 NOTE — TELEPHONE ENCOUNTER
Home Care is calling regarding an established patient with  [employer] [place].        9/21/2023     8:14 AM   Home Care Information   Date of Home Care episode start 9/15/2023   Current following provider Elsa Queen   Date provider agreed to follow 9/15/2023    Name/Phone Number NANCI Anderson #[phone]   Home Care agency [facility]     Requesting orders from: Elsa Queen  Provider is following patient: Yes  Is this a 60-day recertification request?  No    Orders Requested    Skilled Nursing  Request for resumption in care.           Information was gathered and will be sent to provider for review.  RN will contact Home Care with information after provider review.  Confirmed ok to leave a detailed message with call back.  Contact information confirmed and updated as needed.    Annie Iqbal RN

## 2024-07-29 NOTE — LETTER
[facility]  [address]  [place] [address]    July 29, 2024    Keyon Farias  [address]  [facility] [address]      Dear Keyon,    I am a clinic care coordinator who works with Elsa Queen MD with the [facility]. I wanted to introduce myself and provide you with my contact information for you to be able to call me with any questions or concerns. Below is a description of clinic care coordination and how I can further assist you.       The clinic care coordination team is made up of a registered nurse, , financial resource worker and community health worker who understand the health care system. The goal of clinic care coordination is to help you manage your health and improve access to the health care system. Our team works alongside your provider to assist you in determining your health and social needs. We can help you obtain health care and community resources, providing you with necessary information and education. We can work with you through any barriers and develop a care plan that helps coordinate and strengthen the communication between you and your care team.  Our services are voluntary and are offered without charge to you personally.    Please feel free to contact me with any questions or concerns regarding care coordination and what we can offer.      We are focused on providing you with the highest-quality healthcare experience possible.    Sincerely,     Karen Martinez, [facility]  Clinic Care Coordinator  [facility]  [phone]

## 2024-07-29 NOTE — PROGRESS NOTES
Clinic Care Coordination Contact  [facility]/Voicemail    Clinical Data: Care Coordinator Outreach    Outreach Documentation Number of Outreach Attempt   7/29/2024   1:48 PM 1       Left message on patient's voicemail with call back information and requested return call.    Plan: Care Coordinator will send care coordination introduction letter with care coordinator contact information and explanation of care coordination services via mail. Care Coordinator will try to reach patient again in 1-2 business days.    Karen Martinez  [facility]  Clinic Care Coordinator  [facility]  [phone]  [email or web address]

## 2024-07-31 ENCOUNTER — MEDICAL CORRESPONDENCE (OUTPATIENT)
Dept: HEALTH INFORMATION MANAGEMENT | Facility: CLINIC | Age: 64
End: 2024-07-31
Payer: MEDICARE

## 2024-07-31 ENCOUNTER — TELEPHONE (OUTPATIENT)
Dept: INTERVENTIONAL RADIOLOGY/VASCULAR | Facility: CLINIC | Age: 64
End: 2024-07-31
Payer: MEDICARE

## 2024-07-31 NOTE — PROGRESS NOTES
Clinic Care Coordination Contact  [facility]/Voicemail    Clinical Data: Care Coordinator Outreach    Outreach Documentation Number of Outreach Attempt   7/29/2024   1:48 PM 1   7/31/2024  10:06 AM 2       Mother answered phone but didn't speak.  SW familiar with pt who has 24/7 assist with his mother being primary caregiver.      Plan: Care Coordinator sent care coordination introduction letter on 7/29/24 via mail. Care Coordinator will do no further outreaches at this time.    Karen Martinez,  [facility]  Clinic Care Coordinator  [facility]  [phone]  [email or web address]

## 2024-07-31 NOTE — TELEPHONE ENCOUNTER
The following was shared with mother other the phone:    INTERVENTIONAL RADIOLOGY INSTRUCTIONS     You are scheduled for an upcoming procedure in the   Interventional Radiology Department at [facility].       Date: 8/5      Procedure: G/J tube change     Address: [facility]                 [address], [place]  [address]     [address] is located on [facility], across the street from hospital.  There is a skyway attached to this ramp that will direct you to the patient check in area.       Check into the [facility] Lounge at: 12:00 pm         If you have any questions, please call the IR nurses at [phone].     Thank you!      Interventional Radiology Intake Nurse Coordinator  [phone]

## 2024-08-05 ENCOUNTER — HOSPITAL ENCOUNTER (OUTPATIENT)
Facility: CLINIC | Age: 64
Discharge: HOME | End: 2024-08-05
Attending: RADIOLOGY | Admitting: RADIOLOGY
Payer: MEDICARE

## 2024-08-05 ENCOUNTER — OFFICE VISIT (OUTPATIENT)
Dept: FAMILY MEDICINE | Facility: CLINIC | Age: 64
End: 2024-08-05
Payer: MEDICARE

## 2024-08-05 ENCOUNTER — TELEPHONE (OUTPATIENT)
Dept: FAMILY MEDICINE | Facility: CLINIC | Age: 64
End: 2024-08-05

## 2024-08-05 ENCOUNTER — APPOINTMENT (OUTPATIENT)
Dept: INTERVENTIONAL RADIOLOGY/VASCULAR | Facility: CLINIC | Age: 64
End: 2024-08-05
Attending: NURSE PRACTITIONER
Payer: MEDICARE

## 2024-08-05 VITALS — DIASTOLIC BLOOD PRESSURE: 59 MMHG | SYSTOLIC BLOOD PRESSURE: 92 MMHG | HEART RATE: 80 BPM | OXYGEN SATURATION: 92 %

## 2024-08-05 VITALS
RESPIRATION RATE: 18 BRPM | TEMPERATURE: 96.8 F | BODY MASS INDEX: 21.33 KG/M2 | SYSTOLIC BLOOD PRESSURE: 102 MMHG | HEIGHT: 70 IN | WEIGHT: 149 LBS | DIASTOLIC BLOOD PRESSURE: 64 MMHG | OXYGEN SATURATION: 100 % | HEART RATE: 67 BPM

## 2024-08-05 DIAGNOSIS — R13.10 DYSPHAGIA, UNSPECIFIED TYPE: ICD-10-CM

## 2024-08-05 DIAGNOSIS — R68.89 EXCESSIVE ORAL SECRETIONS: ICD-10-CM

## 2024-08-05 DIAGNOSIS — K92.0 HEMATEMESIS, UNSPECIFIED WHETHER NAUSEA PRESENT: ICD-10-CM

## 2024-08-05 DIAGNOSIS — E27.49 SECONDARY ADRENAL INSUFFICIENCY (H): ICD-10-CM

## 2024-08-05 DIAGNOSIS — Z53.9 DIAGNOSIS NOT YET DEFINED: Primary | ICD-10-CM

## 2024-08-05 DIAGNOSIS — E03.9 HYPOTHYROIDISM, UNSPECIFIED TYPE: Primary | ICD-10-CM

## 2024-08-05 DIAGNOSIS — K21.9 GASTROESOPHAGEAL REFLUX DISEASE, UNSPECIFIED WHETHER ESOPHAGITIS PRESENT: ICD-10-CM

## 2024-08-05 LAB
HGB BLD-MCNC: 14.1 G/DL (ref 13.3–17.7)
TSH SERPL DL<=0.005 MIU/L-ACNC: <0.01 UIU/ML (ref 0.3–4.2)

## 2024-08-05 PROCEDURE — G0180 MD CERTIFICATION HHA PATIENT: HCPCS | Performed by: INTERNAL MEDICINE

## 2024-08-05 PROCEDURE — 999N000163 HC STATISTIC SIMPLE TUBE INSERTION/CHARGE, PORT, CATH, FISTULOGRAM

## 2024-08-05 PROCEDURE — 99214 OFFICE O/P EST MOD 30 MIN: CPT | Performed by: INTERNAL MEDICINE

## 2024-08-05 PROCEDURE — 36415 COLL VENOUS BLD VENIPUNCTURE: CPT | Performed by: INTERNAL MEDICINE

## 2024-08-05 PROCEDURE — 84443 ASSAY THYROID STIM HORMONE: CPT | Performed by: INTERNAL MEDICINE

## 2024-08-05 PROCEDURE — C1769 GUIDE WIRE: HCPCS

## 2024-08-05 PROCEDURE — 49452 REPLACE G-J TUBE PERC: CPT

## 2024-08-05 PROCEDURE — 85018 HEMOGLOBIN: CPT | Performed by: INTERNAL MEDICINE

## 2024-08-05 RX ORDER — LEVOTHYROXINE SODIUM 112 UG/1
112 TABLET ORAL DAILY
Qty: 90 TABLET | Refills: 0 | Status: CANCELLED | OUTPATIENT
Start: 2024-08-05

## 2024-08-05 RX ORDER — HYDROCORTISONE 5 MG/1
TABLET ORAL
Qty: 400 TABLET | Refills: 3 | Status: SHIPPED | OUTPATIENT
Start: 2024-08-05

## 2024-08-05 ASSESSMENT — ACTIVITIES OF DAILY LIVING (ADL)
ADLS_ACUITY_SCORE: 53
ADLS_ACUITY_SCORE: 45

## 2024-08-05 ASSESSMENT — PAIN SCALES - GENERAL: PAINLEVEL: NO PAIN (0)

## 2024-08-05 ASSESSMENT — PATIENT HEALTH QUESTIONNAIRE - PHQ9: SUM OF ALL RESPONSES TO PHQ QUESTIONS 1-9: 3

## 2024-08-05 NOTE — TELEPHONE ENCOUNTER
Home Care is calling regarding an established patient with  [facility].        9/21/2023     8:14 AM   Home Care Information   Date of Home Care episode start 9/15/2023   Current following provider Elsa Queen   Date provider agreed to follow 9/15/2023    Name/Phone Number NANCI Anderson #[phone]   Home Care agency [facility]     Requesting orders from: Elsa Queen  Provider is following patient: Yes  Is this a 60-day recertification request?  No    Orders Requested    Speech Therapy  Request for continuation of care with no increase or decrease in frequency  Frequency:  1x/wk for 6 wks effective this week to work on simple alternatives of communication and cognitive stimulation.         Verbal orders given.  Home Care will send orders for provider to sign.  Verbal orders given, provider out of office, covering provider used.  Home Care will send orders for covering provider to sign.    Fre Beckford RN

## 2024-08-05 NOTE — PROGRESS NOTES
"  Assessment & Plan     Hypothyroidism, unspecified type  Change levothyroxine dose based on TSH  - TSH    Gastroesophageal reflux disease, unspecified whether esophagitis present  - pantoprazole (PROTONIX) 2 mg/mL SUSP suspension; Place 20 mLs (40 mg) into Feeding Tube 2 times daily    Excessive oral secretions  - COMPOUNDED NON-CONTROLLED SUBSTANCE (CMPD RX) - PHARMACY TO MIX COMPOUNDED MEDICATION; Scopolamine 0.4mg capsules - take  2 capsule by feeding tube three times daily as needed    Secondary adrenal insufficiency (H24)  - hydrocortisone (CORTEF) 5 MG tablet; Take 15 mg (3 tablets) in the morning and 7.5 mg (1.5 tablet)  at 2:00 PM. During illness patient takes more as a stress dose. Please increase the dose as directed.    Hematemesis, unspecified whether nausea present  No nausea vomiting diarrhea or constipation  He has a g-tube. Which is functioning well.  Will check hemoglobin, increase PPI to twice daily and refer to GI for a possible endoscopy.   - Adult GI  Referral - Consult Only; Future  - Hemoglobin; Future  - Hemoglobin        MED REC REQUIRED  Post Medication Reconciliation Status:  Discharge medications reconciled, continue medications without change  Discharge medications reconciled and changed, see notes/orders    See Patient Instructions    Subjective   Keyon is a 63 year old, presenting for the following health issues:  LAB REQUEST and Recheck Medication    HPI     Keyon is here accompanied by his mother.   He is doing well.  He needs thyroid labs today as well as medication refills  Mother reports that last night he had a vomiting which consisted \" old blood\" because it was black in color. No vomiting since then. He has hx of gastritis per endoscopy many years ago.  He is on PPI once daily.       Objective    /64 (BP Location: Left arm, Patient Position: Sitting, Cuff Size: Adult Regular)   Pulse 67   Temp 96.8  F (36  C) (Temporal)   Resp 18   Ht 1.778 m (5' 10\")   Wt " 67.6 kg (149 lb)   SpO2 100%   BMI 21.38 kg/m    Body mass index is 21.38 kg/m .  Physical Exam           Signed Electronically by: Elsa Queen MD

## 2024-08-05 NOTE — IR NOTE
Interventional Radiology Intra-procedural Nursing Note    Patient Name: Keyon Farias  Medical Record Number: [id number]  Today's Date: August 5, 2024    Start Time: 1356  End of procedure time: 1403  Procedure: Consented for Gastro jejunostomy tube exchange   Report given to: [facility] RN  Time pt departs:  1406    Other Notes: Pt into IR suite 1 via cart. Pt awake and alert. To table in supine position. Monitoring equipment applied. VSS. Tele SR. Dr. Villafana in room. Time out and procedure started. Pt tolerated procedure well. Debrief with Dr. Villafana. 18 Fr GJ tube placed and pressure held until hemostasis achieved. Dressing CDI. No complications. Pt transferred back to [facility].    Medications:  none    Thomas Baumann RN

## 2024-08-05 NOTE — PRE-PROCEDURE
GENERAL PRE-PROCEDURE:   Procedure:  Gastro jejunostomy tube exchange  Date/Time:  8/5/2024 12:45 PM    Written consent obtained?: Yes    Risks and benefits: Risks, benefits and alternatives were discussed    DC Plan: Appropriate discharge home plan in place for patients who are going home after procedure   Consent given by:  Guardian  Patient states understanding of procedure being performed: Yes    Patient's understanding of procedure matches consent: Yes    Procedure consent matches procedure scheduled: Yes    Appropriately NPO:  Yes    Total time: 15 minutes    Thanks [facility] Interventional Radiology CNP ([phone]) (phone [phone])

## 2024-08-05 NOTE — PROGRESS NOTES
[facility] Discharge Nursing Note    Patient Information  Name: Keyon Farias  Age: 63 year old    Discharge Education:  Discharge instructions reviewed: Yes  Additional education/resources provided: No  Patient/patient representative verbalizes understanding: Yes  Patient discharging on new medications: No  Medication education completed: N/A    Discharge Plans:   Discharge location: home  Discharge ride contacted: Yes  Approximate discharge time: 1435    Discharge Criteria:  Discharge criteria met and vital signs stable: Yes    Patient Belongs:  Patient belongings returned to patient: Yes    Rosemary Aldana RN

## 2024-08-05 NOTE — DISCHARGE INSTRUCTIONS
G-tube Exchange Discharge Instructions     After you go home:    You may resume your normal diet    Care of Insertion Site:    For the first 48 hrs, check your puncture site every couple hours while you are awake   You may remove/change the dressing tomorrow  You may shower tomorrow  No tub baths, whirlpools or swimming until your puncture site has fully healed     Activity     You may go back to normal activity in 24 hours  Wait 48 hours before lifting, straining, exercise or other strenuous activity    Medicines:    You may resume all medications  Resume your Warfarin/Coumadin at your regular dose today. Follow up with your provider to have your INR rechecked  Resume your Platelet Inhibitors and Aspirin tomorrow at your regular dose  For minor pain, you may take Acetaminophen (Tylenol) or Ibuprofen (Advil)                 Call the provider who ordered this procedure if:    Blood or fluid is draining from the site  The site is red, swollen, hot, tender or there is foul-smelling drainage  Chills or a fever greater than 101 F (38 C)  Increased pain at the site  Any questions or concerns    Call  911 or go to the Emergency Room if:    Severe pain or trouble breathing  Bleeding that you cannot control      If you have questions call:          [facility] Radiology Dept @ [phone]    Or you can contact your provider via My Chart        Caring for your G-tube    Tube Maintenance:    For ENFit Tubes:    Do NOT over tighten the connections (the purple end & hub connection).    Clean the connecting ends (especially the hub) every day.    If you are unable to disconnect the tubing ends, soak the connection in warm water (or wrap in a wet warm washcloth) to try and loosen the connection.    Possible problems with your tube may include:    Clogged with medications or feedings - most obstructions can be cleared with a small (3cc) syringe and warm water. This may be repeated until the tube is unclogged. This can be  prevented by frequently flushing the tube with water (60cc) during the day and always after medications & feedings.    Tube pulls out or falls out -cover the opening with gauze & tape. Call [phone] for further instructions    Skin breakdown and/or yeast infections at the insertion site - use of skin barrier ointments and anti-fungal powders can treat most site irritations.  Ask your pharmacist or provider for assistance (a prescription is not necessary).    In general, tube problems (including pulled tubes) are NOT emergency situations. Unless the pulling out of a tube is accompanied by uncontrollable bleeding, please DO NOT GO TO THE EMERGENCY ROOM!  Call [phone] with problems.    Tube Care:    Change the gauze dressing every 24 hours and if soiled (dirty).  Stabilize all tubes securely by using gauze and tape.  Clean tube site with soap & water using a cotton applicator (Q-tip) as needed to prevent irritation.  Flush feeding tube with 60cc of warm or room temperature water before and after meds.  To prevent the tube from clogging, ask your provider or pharmacist if liquid forms of your medications are available. If not, crush the pills well & be sure to flush the tube before & after all medications.  Flush feeding tube a minimum of every 4 hours and when feeding is completed with 60cc of water to keep the tube clear and avoid clogging.  Pt may use an abdominal (waist) binder to protect the G-tube.    If there is continued oozing or bleeding, redness, yellow/green/foul smelling drainage    STOP the feedings & use of the tube immediately if there is:    Continued oozing or bleeding at the site  Redness  Yellow/green or foul smelling drainage at the site  Uncontrolled stomach pain    Many of the supplies mentioned above can be purchased at your local pharmacy      For issues with your tube, please call:    [place] Interventional Radiology Dept at [phone]

## 2024-08-05 NOTE — PATIENT INSTRUCTIONS
Lab work  Consult to stomach doctor     If he has another vomiting which is black in color or bright red blood, please call us immediately or take him to the ER

## 2024-08-05 NOTE — PROGRESS NOTES
[facility] Post Procedure Note    Patient Information  Name: Keyon Farias  Age: 63 year old    Post Procedure- GJ tube exchange  18 Fr.  Time patient returned to [facility]: 1410  Concerns/abnormal assessment:   GJ tube intact   Wife at bedside  No sedation meds given   If abnormal assessment, provider notified: N/A  Plan/Other:   Discharge to home    Ervin Lopez RN

## 2024-08-05 NOTE — PROGRESS NOTES
[facility] Admission Nursing Note    Patient Information  Name: Keyon Farias  Age: 63 year old  Reason for admission: GJ tube   [facility] arrival time: 1225    Visitor Information  Name: Savannah nieves     Patient Admission/Assessment   Pre-procedure assessment complete: Yes  If abnormal assessment/labs, provider notified: N/A  NPO: Yes  Medications held per instructions/orders: N/A  Consent: deferred  If applicable, pregnancy test status: deferred  Patient oriented to room: Yes  Education/questions answered: Yes  Plan/other: GJ tube exchange    Discharge Planning  Discharge name/phone number: Jordon nieves [phone]   Overnight post sedation caregiver: above  Discharge location: home    Gael Welch RN

## 2024-08-06 DIAGNOSIS — Z93.4 GASTROJEJUNOSTOMY TUBE STATUS (H): Primary | ICD-10-CM

## 2024-08-07 RX ORDER — LEVOTHYROXINE SODIUM 100 UG/1
100 TABLET ORAL DAILY
Qty: 90 TABLET | Refills: 0 | Status: SHIPPED | OUTPATIENT
Start: 2024-08-07

## 2024-08-14 ENCOUNTER — VIRTUAL VISIT (OUTPATIENT)
Dept: PHARMACY | Facility: CLINIC | Age: 64
End: 2024-08-14
Attending: INTERNAL MEDICINE
Payer: MEDICAID

## 2024-08-14 DIAGNOSIS — E23.0 PANHYPOPITUITARISM (H): ICD-10-CM

## 2024-08-14 DIAGNOSIS — K21.9 GASTROESOPHAGEAL REFLUX DISEASE, UNSPECIFIED WHETHER ESOPHAGITIS PRESENT: ICD-10-CM

## 2024-08-14 DIAGNOSIS — G40.909 RECURRENT SEIZURES (H): ICD-10-CM

## 2024-08-14 DIAGNOSIS — R52 INTERMITTENT PAIN: ICD-10-CM

## 2024-08-14 DIAGNOSIS — R13.10 DYSPHAGIA, UNSPECIFIED TYPE: ICD-10-CM

## 2024-08-14 DIAGNOSIS — E29.1 HYPOGONADISM MALE: ICD-10-CM

## 2024-08-14 DIAGNOSIS — Z78.9 TAKES DIETARY SUPPLEMENTS: ICD-10-CM

## 2024-08-14 DIAGNOSIS — E03.9 HYPOTHYROIDISM, UNSPECIFIED TYPE: ICD-10-CM

## 2024-08-14 DIAGNOSIS — E87.1 HYPONATREMIA: ICD-10-CM

## 2024-08-14 DIAGNOSIS — J96.01 ACUTE RESPIRATORY FAILURE WITH HYPOXIA (H): Primary | ICD-10-CM

## 2024-08-14 PROCEDURE — 99207 PR NO CHARGE LOS: CPT | Mod: 93 | Performed by: PHARMACIST

## 2024-08-14 NOTE — PROGRESS NOTES
Medication Therapy Management (MTM) Encounter    ASSESSMENT:                            Medication Adherence/Access: See below for considerations    Acute Respiratory Failure/Hyponatremia/Dysphagia: Somewhat improved per patient's mother.     History of traumatic brain injury/Recurrent seizures: Stable per patient's mother.     GERD: Stable.      Hypothyroidism: Last TSH is below normal limits, but T4 is within normal limits. Too soon to assess dose change.     Secondary adrenal insufficiency/Hypogonadism: Stable.     Pain: Stable.      Supplements: Stable.     PLAN:                            Continue to take your medications as prescribed.     Follow-up: As Needed    SUBJECTIVE/OBJECTIVE:                          Keyon Farias is a 64 year old male seen for a transitions of care visit. He was discharged from [facility] on 7/27/2024 for severe sepsis from recurrent aspiration pneumonia/UTI. Visit conducted with mother/caregiver, Savannah, with patient present as he is non-verbal.      Reason for visit: Hospital Follow-up.    Allergies/ADRs: Reviewed in chart  Past Medical History: Reviewed in chart  Tobacco: He reports that he quit smoking about 35 years ago. His smoking use included cigarettes. He has never used smokeless tobacco.  Alcohol: none    Medication Adherence/Access: No issues reported.  Medications are administered via G-tube by mother    Acute Respiratory Failure/Hyponatremia/Dysphagia: Savannah reports that he's doing better following discharge, no questions or major concerns for medications. Still figuring out the nutrition - has request in for new product through [employer]. Using up remaining [employer] supply. Current medications include sodium chloride 1 gram daily (though sometimes just Pink Himalayan salt) and does take potassium/sodium phosphates packet daily (they pay out of pocket for these - price was better this time)  He does have homecare. Keyon also receives albuterol nebs a  "few times a day. When he's \"rattly\" - Savannah uses scopolamine compounded capsules three times daily as needed/glycopyrrolate and/or Mucinex.  No side effects reported.  Savannah reports Keyon's breathing has been stable. For G-tube care, Savannah uses bacitracin, mupirocin, or hydrocortisone as needed.  She reports this is effective.      History of traumatic brain injury/Recurrent seizures: Patient is taking carbamazepine 150 mg three times daily (0600, 1200, 2400)/carbamazepine 100 mg once daily at 1800 and brivaracetam 100 mg twice daily at 0900/2100. Working to get refill of Briviact from neurology. Denies any recent episodes.     GERD    Current medications include: Protonix (pantoprazole) 40 mg twice daily and metoclopramide three-four times daily (although prescribed for four times daily). Savannah reports no current symptoms and indicates current regimen is effective.       Hypothyroidism   Levothyroxine 100 mcg daily (dose recently decreased)  Patient is having the following symptoms: none.      TSH   Date Value Ref Range Status   08/05/2024 <0.01 (L) 0.30 - 4.20 uIU/mL Final   05/09/2022 <0.01 (L) 0.40 - 4.00 mU/L Final   07/05/2018 <0.01 (L) 0.40 - 4.00 mU/L Final     T4 Free   Date Value Ref Range Status   04/22/2021 1.40 0.76 - 1.46 ng/dL Final     Free T4   Date Value Ref Range Status   07/01/2024 1.26 0.90 - 1.70 ng/dL Final       Secondary adrenal insufficiency/Hypogonadism: Patient is receiving hydrocortisone 15 mg every morning/7.5 mg every afternoon and testosterone cypionate 60 mg every 7 days. Follows with endocrinology. Denies any known issues.      Pain:  Patient has acetaminophen available for use, but is not currently needing to use.  No side effects reported when needed.     Supplements: Patient is receive vitamin C 1,000 mg daily, vitamin D3 2,000 units daily, vitamin B12 daily, a daily multivitamin. Denies any current issues.     Today's Vitals: There were no vitals taken for this " visit.    BP Readings from Last 3 Encounters:   08/05/24 92/59   08/05/24 102/64   07/27/24 121/52     Wt Readings from Last 5 Encounters:   08/05/24 149 lb (67.6 kg)   07/24/24 149 lb 11.1 oz (67.9 kg)   07/12/24 153 lb (69.4 kg)   06/04/24 165 lb (74.8 kg)   05/05/24 165 lb 9.1 oz (75.1 kg)       ----------------  Post Discharge Medication Reconciliation Status: discharge medications reconciled, continue medications without change.    I spent 22 minutes with this patient today. A copy of the visit note was provided to the patient's provider(s).    A summary of these recommendations was mailed to the patient.    Naveed Ortiz, PharmD, BCACP  Medication Therapy Management Pharmacist  Voicemail: [phone]    Telemedicine Visit Details  Type of service:  Telephone visit  Start Time:  9:30 AM  End Time:  9:52 AM     Medication Therapy Recommendations  No medication therapy recommendations to display

## 2024-08-14 NOTE — PATIENT INSTRUCTIONS
"Recommendations from today's MTM visit:                                                    MTM (medication therapy management) is a service provided by a clinical pharmacist designed to help you get the most of out of your medicines.      Continue to take your medications as prescribed.     Follow-up: As Needed    It was great speaking with you today.  I value your experience and would be very thankful for your time in providing feedback in our clinic survey. In the next few days, you may receive an email or text message from [employer] with a link to a survey related to your  clinical pharmacist.\"     To schedule another MTM appointment, please call the clinic directly or you may call the MTM scheduling line at [phone] or toll-free at [phone].     My Clinical Pharmacist's contact information:                                                      Please feel free to contact me with any questions or concerns you have.      Naveed Ortiz, PharmD, [facility]  Medication Therapy Management Pharmacist  Voicemail: [phone]    "

## 2024-08-15 ENCOUNTER — HOSPITAL ENCOUNTER (EMERGENCY)
Facility: CLINIC | Age: 64
Discharge: HOME | End: 2024-08-16
Attending: EMERGENCY MEDICINE | Admitting: EMERGENCY MEDICINE
Payer: MEDICARE

## 2024-08-15 ENCOUNTER — APPOINTMENT (OUTPATIENT)
Dept: GENERAL RADIOLOGY | Facility: CLINIC | Age: 64
End: 2024-08-15
Attending: EMERGENCY MEDICINE
Payer: MEDICARE

## 2024-08-15 DIAGNOSIS — R50.9 FEVER, UNSPECIFIED FEVER CAUSE: ICD-10-CM

## 2024-08-15 DIAGNOSIS — N39.0 RECURRENT UTI: ICD-10-CM

## 2024-08-15 LAB
ALBUMIN SERPL BCG-MCNC: 3.9 G/DL (ref 3.5–5.2)
ALBUMIN UR-MCNC: 30 MG/DL
ALP SERPL-CCNC: 105 U/L (ref 40–150)
ALT SERPL W P-5'-P-CCNC: 27 U/L (ref 0–70)
ANION GAP SERPL CALCULATED.3IONS-SCNC: 9 MMOL/L (ref 7–15)
APPEARANCE UR: ABNORMAL
AST SERPL W P-5'-P-CCNC: 33 U/L (ref 0–45)
BACTERIA #/AREA URNS HPF: ABNORMAL /HPF
BASOPHILS # BLD AUTO: 0.1 10E3/UL (ref 0–0.2)
BASOPHILS NFR BLD AUTO: 1 %
BILIRUB SERPL-MCNC: 0.3 MG/DL
BILIRUB UR QL STRIP: NEGATIVE
BUN SERPL-MCNC: 19.2 MG/DL (ref 8–23)
CALCIUM SERPL-MCNC: 9.1 MG/DL (ref 8.8–10.4)
CHLORIDE SERPL-SCNC: 100 MMOL/L (ref 98–107)
COLOR UR AUTO: YELLOW
CREAT SERPL-MCNC: 0.96 MG/DL (ref 0.67–1.17)
EGFRCR SERPLBLD CKD-EPI 2021: 89 ML/MIN/1.73M2
EOSINOPHIL # BLD AUTO: 1 10E3/UL (ref 0–0.7)
EOSINOPHIL NFR BLD AUTO: 10 %
ERYTHROCYTE [DISTWIDTH] IN BLOOD BY AUTOMATED COUNT: 17.1 % (ref 10–15)
FLUAV RNA SPEC QL NAA+PROBE: NEGATIVE
FLUBV RNA RESP QL NAA+PROBE: NEGATIVE
GLUCOSE SERPL-MCNC: 127 MG/DL (ref 70–99)
GLUCOSE UR STRIP-MCNC: NEGATIVE MG/DL
HCO3 SERPL-SCNC: 30 MMOL/L (ref 22–29)
HCT VFR BLD AUTO: 43.9 % (ref 40–53)
HGB BLD-MCNC: 14.1 G/DL (ref 13.3–17.7)
HGB UR QL STRIP: NEGATIVE
HOLD SPECIMEN: NORMAL
IMM GRANULOCYTES # BLD: 0 10E3/UL
IMM GRANULOCYTES NFR BLD: 0 %
KETONES UR STRIP-MCNC: NEGATIVE MG/DL
LACTATE SERPL-SCNC: 1.6 MMOL/L (ref 0.7–2)
LEUKOCYTE ESTERASE UR QL STRIP: ABNORMAL
LYMPHOCYTES # BLD AUTO: 3 10E3/UL (ref 0.8–5.3)
LYMPHOCYTES NFR BLD AUTO: 30 %
MCH RBC QN AUTO: 28.4 PG (ref 26.5–33)
MCHC RBC AUTO-ENTMCNC: 32.1 G/DL (ref 31.5–36.5)
MCV RBC AUTO: 88 FL (ref 78–100)
MONOCYTES # BLD AUTO: 1.2 10E3/UL (ref 0–1.3)
MONOCYTES NFR BLD AUTO: 12 %
NEUTROPHILS # BLD AUTO: 4.6 10E3/UL (ref 1.6–8.3)
NEUTROPHILS NFR BLD AUTO: 46 %
NITRATE UR QL: NEGATIVE
NRBC # BLD AUTO: 0 10E3/UL
NRBC BLD AUTO-RTO: 0 /100
PH UR STRIP: 7.5 [PH] (ref 5–7)
PLATELET # BLD AUTO: 138 10E3/UL (ref 150–450)
POTASSIUM SERPL-SCNC: 5 MMOL/L (ref 3.4–5.3)
PROCALCITONIN SERPL IA-MCNC: 0.07 NG/ML
PROT SERPL-MCNC: 7.5 G/DL (ref 6.4–8.3)
RBC # BLD AUTO: 4.97 10E6/UL (ref 4.4–5.9)
RBC URINE: 3 /HPF
RSV RNA SPEC NAA+PROBE: NEGATIVE
SARS-COV-2 RNA RESP QL NAA+PROBE: NEGATIVE
SODIUM SERPL-SCNC: 139 MMOL/L (ref 135–145)
SP GR UR STRIP: 1.02 (ref 1–1.03)
UROBILINOGEN UR STRIP-MCNC: NORMAL MG/DL
WBC # BLD AUTO: 10 10E3/UL (ref 4–11)
WBC URINE: 39 /HPF

## 2024-08-15 PROCEDURE — 250N000013 HC RX MED GY IP 250 OP 250 PS 637: Performed by: EMERGENCY MEDICINE

## 2024-08-15 PROCEDURE — 96360 HYDRATION IV INFUSION INIT: CPT

## 2024-08-15 PROCEDURE — 87040 BLOOD CULTURE FOR BACTERIA: CPT | Performed by: EMERGENCY MEDICINE

## 2024-08-15 PROCEDURE — 87637 SARSCOV2&INF A&B&RSV AMP PRB: CPT | Performed by: EMERGENCY MEDICINE

## 2024-08-15 PROCEDURE — 81001 URINALYSIS AUTO W/SCOPE: CPT | Performed by: EMERGENCY MEDICINE

## 2024-08-15 PROCEDURE — 85025 COMPLETE CBC W/AUTO DIFF WBC: CPT | Performed by: EMERGENCY MEDICINE

## 2024-08-15 PROCEDURE — 80053 COMPREHEN METABOLIC PANEL: CPT | Performed by: EMERGENCY MEDICINE

## 2024-08-15 PROCEDURE — 99284 EMERGENCY DEPT VISIT MOD MDM: CPT | Mod: 25

## 2024-08-15 PROCEDURE — 87086 URINE CULTURE/COLONY COUNT: CPT | Performed by: EMERGENCY MEDICINE

## 2024-08-15 PROCEDURE — 83605 ASSAY OF LACTIC ACID: CPT | Performed by: EMERGENCY MEDICINE

## 2024-08-15 PROCEDURE — 36415 COLL VENOUS BLD VENIPUNCTURE: CPT | Performed by: EMERGENCY MEDICINE

## 2024-08-15 PROCEDURE — 258N000003 HC RX IP 258 OP 636: Performed by: EMERGENCY MEDICINE

## 2024-08-15 PROCEDURE — 84145 PROCALCITONIN (PCT): CPT | Performed by: EMERGENCY MEDICINE

## 2024-08-15 PROCEDURE — 71045 X-RAY EXAM CHEST 1 VIEW: CPT

## 2024-08-15 RX ORDER — AMOXICILLIN 250 MG/5ML
1000 POWDER, FOR SUSPENSION ORAL ONCE
Status: COMPLETED | OUTPATIENT
Start: 2024-08-15 | End: 2024-08-15

## 2024-08-15 RX ORDER — AMOXICILLIN 400 MG/5ML
1000 POWDER, FOR SUSPENSION ORAL 2 TIMES DAILY
Qty: 175 ML | Refills: 0 | Status: SHIPPED | OUTPATIENT
Start: 2024-08-15 | End: 2024-08-23

## 2024-08-15 RX ORDER — AMOXICILLIN 400 MG/5ML
1000 POWDER, FOR SUSPENSION ORAL 2 TIMES DAILY
Qty: 175 ML | Refills: 0 | Status: SHIPPED | OUTPATIENT
Start: 2024-08-15 | End: 2024-08-15

## 2024-08-15 RX ORDER — LINEZOLID 100 MG/5ML
600 GRANULE, FOR SUSPENSION ORAL ONCE
Status: DISCONTINUED | OUTPATIENT
Start: 2024-08-15 | End: 2024-08-15

## 2024-08-15 RX ORDER — SULFAMETHOXAZOLE AND TRIMETHOPRIM 200; 40 MG/5ML; MG/5ML
160 SUSPENSION ORAL 2 TIMES DAILY
Qty: 280 ML | Refills: 0 | Status: SHIPPED | OUTPATIENT
Start: 2024-08-15 | End: 2024-08-15

## 2024-08-15 RX ORDER — SULFAMETHOXAZOLE AND TRIMETHOPRIM 200; 40 MG/5ML; MG/5ML
20 SUSPENSION ORAL ONCE
Status: COMPLETED | OUTPATIENT
Start: 2024-08-15 | End: 2024-08-15

## 2024-08-15 RX ORDER — SULFAMETHOXAZOLE AND TRIMETHOPRIM 200; 40 MG/5ML; MG/5ML
160 SUSPENSION ORAL 2 TIMES DAILY
Qty: 280 ML | Refills: 0 | Status: SHIPPED | OUTPATIENT
Start: 2024-08-15 | End: 2024-08-23

## 2024-08-15 RX ADMIN — SULFAMETHOXAZOLE AND TRIMETHOPRIM 160 MG: 200; 40 SUSPENSION ORAL at 21:55

## 2024-08-15 RX ADMIN — AMOXICILLIN 1000 MG: 250 POWDER, FOR SUSPENSION ORAL at 21:56

## 2024-08-15 RX ADMIN — SODIUM CHLORIDE 500 ML: 9 INJECTION, SOLUTION INTRAVENOUS at 21:11

## 2024-08-15 ASSESSMENT — ACTIVITIES OF DAILY LIVING (ADL)
ADLS_ACUITY_SCORE: 41

## 2024-08-15 ASSESSMENT — COLUMBIA-SUICIDE SEVERITY RATING SCALE - C-SSRS: IS THE PATIENT NOT ABLE TO COMPLETE C-SSRS: UNABLE TO VERBALIZE
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
## 2024-08-15 NOTE — ED NOTES
Bed: ED28  Expected date:   Expected time:   Means of arrival:   Comments:  Kerri 64m quad fever eta 5

## 2024-08-15 NOTE — ED TRIAGE NOTES
BIBA d/t fever reported by mother- stating Tmax was 100 and was given 1,000mg of Tylenol. Mother is worried about fever and aspiration. Reports Pt goes downhill very quickly when ill.      Triage Assessment (Adult)       Row Name 08/15/24 1842          Triage Assessment    Airway WDL WDL        Respiratory WDL    Respiratory WDL WDL        Skin Circulation/Temperature WDL    Skin Circulation/Temperature WDL WDL        Cardiac WDL    Cardiac WDL WDL        Peripheral/Neurovascular WDL    Peripheral Neurovascular WDL WDL        Cognitive/Neuro/Behavioral WDL    Cognitive/Neuro/Behavioral WDL X     Level of Consciousness alert     Arousal Level opens eyes spontaneously     Orientation other (see comments)     Speech incoherent  Nonverbal at baseline     Mood/Behavior calm;cooperative        Rea Coma Scale    Best Eye Response 4-->(E4) spontaneous     Best Motor Response 6-->(M6) obeys commands     Best Verbal Response 2-->(V2) incomprehensible speech     [place] Coma Scale Score 12

## 2024-08-16 VITALS
TEMPERATURE: 100.1 F | OXYGEN SATURATION: 100 % | RESPIRATION RATE: 16 BRPM | SYSTOLIC BLOOD PRESSURE: 131 MMHG | HEART RATE: 68 BPM | DIASTOLIC BLOOD PRESSURE: 75 MMHG

## 2024-08-17 LAB
BACTERIA UR CULT: ABNORMAL

## 2024-08-18 ENCOUNTER — TELEPHONE (OUTPATIENT)
Dept: NURSING | Facility: CLINIC | Age: 64
End: 2024-08-18
Payer: MEDICARE

## 2024-08-18 NOTE — TELEPHONE ENCOUNTER
[facility]    Reason for call: Lab Result Notification     Lab Result (including Rx patient on, if applicable).  If culture, copy of lab report at bottom.  Lab Result: Urine Culture - See Below    ED Rx: amoxicillin (AMOXIL) 400 MG/5ML suspension - Place 12.5 mLs (1,000 mg) into G tube 2 times daily (SUSCEPTIBLE) AND sulfamethoxazole-trimethoprim (BACTRIM/SEPTRA) 8 mg/mL suspension - Place 20 mLs (160 mg) into G tube 2 times daily (SUSCEPTIBLE)    Creatinine Level (mg/dl)   Creatinine   Date Value Ref Range Status   08/15/2024 0.96 0.67 - 1.17 mg/dL Final   05/24/2021 0.80 0.66 - 1.25 mg/dL Final    Creatinine clearance (ml/min), if applicable    Serum creatinine: 0.96 mg/dL 08/15/24 1906  Estimated creatinine clearance: 76.3 mL/min     ED Symptoms: Fevers, history of multiple UTI's and aspiration pneumonia with sepsis.  Positive UA.     Current Symptoms: Fevers have resolved. Urinating normally. Denies any new or worsening symptoms.     RN Recommendations/Instructions per [facility] ED lab result protocol:   [facility] ED lab result protocol utilized: Urine Culture    Patient/care giver notified to contact your PCP clinic or return to the Emergency department if your:  Symptoms do not resolve after completing antibiotic.  Symptoms worsen or other concerning symptoms.       BRANDI ARVIZU RN

## 2024-08-19 ENCOUNTER — PATIENT OUTREACH (OUTPATIENT)
Dept: CARE COORDINATION | Facility: CLINIC | Age: 64
End: 2024-08-19
Payer: MEDICARE

## 2024-08-19 NOTE — PROGRESS NOTES
Clinic Care Coordination Contact  Community Health Worker Initial Outreach    CHW Initial Information Gathering:  Referral Source: ED Follow-Up  Current living arrangement:: Not Assessed  CHW Additional Questions  If ED/Hospital discharge, follow-up appointment scheduled as recommended?: N/A  Medication changes made following ED/Hospital discharge?: N/A  MyChart active?: No  Patient agreeable to assistance with activating MyChart?: No    Patient accepts CC: No, Patient's Mother expressed no additional support is needed. Further indicating that Patient is well supported at this time . Patient will be sent Care Coordination introduction letter for future reference.     Deloris Anton  Community Health Worker    Connected Care Resources   [facility]     *Connected Care Resources Team does NOT   follow patient ongoing. Referrals are identified   based on internal discharge report and the outreach is to ensure   Patient has understanding of their discharge instructions.

## 2024-08-20 LAB — BACTERIA BLD CULT: NO GROWTH

## 2024-08-21 ENCOUNTER — MEDICAL CORRESPONDENCE (OUTPATIENT)
Dept: HEALTH INFORMATION MANAGEMENT | Facility: CLINIC | Age: 64
End: 2024-08-21

## 2024-08-23 ENCOUNTER — APPOINTMENT (OUTPATIENT)
Dept: GENERAL RADIOLOGY | Facility: CLINIC | Age: 64
DRG: 177 | End: 2024-08-23
Attending: EMERGENCY MEDICINE
Payer: MEDICARE

## 2024-08-23 ENCOUNTER — HOSPITAL ENCOUNTER (INPATIENT)
Facility: CLINIC | Age: 64
LOS: 8 days | Discharge: HOME HEALTH | DRG: 177 | End: 2024-08-31
Attending: EMERGENCY MEDICINE | Admitting: INTERNAL MEDICINE
Payer: MEDICARE

## 2024-08-23 ENCOUNTER — APPOINTMENT (OUTPATIENT)
Dept: CT IMAGING | Facility: CLINIC | Age: 64
DRG: 177 | End: 2024-08-23
Attending: EMERGENCY MEDICINE
Payer: MEDICARE

## 2024-08-23 DIAGNOSIS — E78.5 HYPERLIPIDEMIA, UNSPECIFIED HYPERLIPIDEMIA TYPE: Primary | ICD-10-CM

## 2024-08-23 DIAGNOSIS — N30.00 ACUTE CYSTITIS WITHOUT HEMATURIA: ICD-10-CM

## 2024-08-23 DIAGNOSIS — J69.0 ASPIRATION PNEUMONIA, UNSPECIFIED ASPIRATION PNEUMONIA TYPE, UNSPECIFIED LATERALITY, UNSPECIFIED PART OF LUNG (H): ICD-10-CM

## 2024-08-23 DIAGNOSIS — K59.00 CONSTIPATION, UNSPECIFIED CONSTIPATION TYPE: ICD-10-CM

## 2024-08-23 DIAGNOSIS — J69.0 ASPIRATION PNEUMONIA OF RIGHT MIDDLE LOBE, UNSPECIFIED ASPIRATION PNEUMONIA TYPE (H): ICD-10-CM

## 2024-08-23 LAB
ALBUMIN SERPL BCG-MCNC: 3.6 G/DL (ref 3.5–5.2)
ALBUMIN UR-MCNC: 30 MG/DL
ALP SERPL-CCNC: 91 U/L (ref 40–150)
ALT SERPL W P-5'-P-CCNC: 25 U/L (ref 0–70)
AMORPH CRY #/AREA URNS HPF: ABNORMAL /HPF
ANION GAP SERPL CALCULATED.3IONS-SCNC: 8 MMOL/L (ref 7–15)
APPEARANCE UR: CLEAR
AST SERPL W P-5'-P-CCNC: 41 U/L (ref 0–45)
BACTERIA #/AREA URNS HPF: ABNORMAL /HPF
BASOPHILS # BLD AUTO: 0.1 10E3/UL (ref 0–0.2)
BASOPHILS NFR BLD AUTO: 1 %
BILIRUB DIRECT SERPL-MCNC: <0.2 MG/DL (ref 0–0.3)
BILIRUB SERPL-MCNC: 0.2 MG/DL
BILIRUB UR QL STRIP: NEGATIVE
BUN SERPL-MCNC: 18.9 MG/DL (ref 8–23)
CALCIUM SERPL-MCNC: 8.7 MG/DL (ref 8.8–10.4)
CHLORIDE SERPL-SCNC: 104 MMOL/L (ref 98–107)
COLOR UR AUTO: YELLOW
CREAT SERPL-MCNC: 1.05 MG/DL (ref 0.67–1.17)
EGFRCR SERPLBLD CKD-EPI 2021: 80 ML/MIN/1.73M2
EOSINOPHIL # BLD AUTO: 0.4 10E3/UL (ref 0–0.7)
EOSINOPHIL NFR BLD AUTO: 5 %
ERYTHROCYTE [DISTWIDTH] IN BLOOD BY AUTOMATED COUNT: 17.2 % (ref 10–15)
FLUAV RNA SPEC QL NAA+PROBE: NEGATIVE
FLUBV RNA RESP QL NAA+PROBE: NEGATIVE
GLUCOSE SERPL-MCNC: 100 MG/DL (ref 70–99)
GLUCOSE UR STRIP-MCNC: NEGATIVE MG/DL
HCO3 SERPL-SCNC: 28 MMOL/L (ref 22–29)
HCT VFR BLD AUTO: 41.4 % (ref 40–53)
HGB BLD-MCNC: 13.3 G/DL (ref 13.3–17.7)
HGB UR QL STRIP: NEGATIVE
HOLD SPECIMEN: NORMAL
HOLD SPECIMEN: NORMAL
IMM GRANULOCYTES # BLD: 0 10E3/UL
IMM GRANULOCYTES NFR BLD: 0 %
KETONES UR STRIP-MCNC: NEGATIVE MG/DL
LACTATE SERPL-SCNC: 1.2 MMOL/L (ref 0.7–2)
LEUKOCYTE ESTERASE UR QL STRIP: ABNORMAL
LIPASE SERPL-CCNC: 76 U/L (ref 13–60)
LYMPHOCYTES # BLD AUTO: 2.3 10E3/UL (ref 0.8–5.3)
LYMPHOCYTES NFR BLD AUTO: 30 %
MAGNESIUM SERPL-MCNC: 2.2 MG/DL (ref 1.7–2.3)
MCH RBC QN AUTO: 28.2 PG (ref 26.5–33)
MCHC RBC AUTO-ENTMCNC: 32.1 G/DL (ref 31.5–36.5)
MCV RBC AUTO: 88 FL (ref 78–100)
MONOCYTES # BLD AUTO: 1 10E3/UL (ref 0–1.3)
MONOCYTES NFR BLD AUTO: 13 %
MUCOUS THREADS #/AREA URNS LPF: PRESENT /LPF
NEUTROPHILS # BLD AUTO: 4 10E3/UL (ref 1.6–8.3)
NEUTROPHILS NFR BLD AUTO: 52 %
NITRATE UR QL: NEGATIVE
NRBC # BLD AUTO: 0 10E3/UL
NRBC BLD AUTO-RTO: 0 /100
PH UR STRIP: 8.5 [PH] (ref 5–7)
PHOSPHATE SERPL-MCNC: 2.1 MG/DL (ref 2.5–4.5)
PLATELET # BLD AUTO: 244 10E3/UL (ref 150–450)
POTASSIUM SERPL-SCNC: 4.7 MMOL/L (ref 3.4–5.3)
PROT SERPL-MCNC: 7.4 G/DL (ref 6.4–8.3)
RBC # BLD AUTO: 4.71 10E6/UL (ref 4.4–5.9)
RBC URINE: 3 /HPF
RSV RNA SPEC NAA+PROBE: NEGATIVE
SARS-COV-2 RNA RESP QL NAA+PROBE: NEGATIVE
SODIUM SERPL-SCNC: 140 MMOL/L (ref 135–145)
SP GR UR STRIP: 1.03 (ref 1–1.03)
SQUAMOUS EPITHELIAL: <1 /HPF
UROBILINOGEN UR STRIP-MCNC: 2 MG/DL
WBC # BLD AUTO: 7.7 10E3/UL (ref 4–11)
WBC URINE: 6 /HPF

## 2024-08-23 PROCEDURE — 84100 ASSAY OF PHOSPHORUS: CPT | Performed by: NURSE PRACTITIONER

## 2024-08-23 PROCEDURE — 83735 ASSAY OF MAGNESIUM: CPT | Performed by: NURSE PRACTITIONER

## 2024-08-23 PROCEDURE — 87086 URINE CULTURE/COLONY COUNT: CPT | Performed by: EMERGENCY MEDICINE

## 2024-08-23 PROCEDURE — 99222 1ST HOSP IP/OBS MODERATE 55: CPT | Mod: AI | Performed by: NURSE PRACTITIONER

## 2024-08-23 PROCEDURE — 82374 ASSAY BLOOD CARBON DIOXIDE: CPT | Performed by: EMERGENCY MEDICINE

## 2024-08-23 PROCEDURE — 250N000009 HC RX 250: Performed by: EMERGENCY MEDICINE

## 2024-08-23 PROCEDURE — 87040 BLOOD CULTURE FOR BACTERIA: CPT | Performed by: EMERGENCY MEDICINE

## 2024-08-23 PROCEDURE — 99285 EMERGENCY DEPT VISIT HI MDM: CPT | Mod: 25

## 2024-08-23 PROCEDURE — 120N000001 HC R&B MED SURG/OB

## 2024-08-23 PROCEDURE — 250N000011 HC RX IP 250 OP 636: Performed by: EMERGENCY MEDICINE

## 2024-08-23 PROCEDURE — 82248 BILIRUBIN DIRECT: CPT | Performed by: EMERGENCY MEDICINE

## 2024-08-23 PROCEDURE — 96361 HYDRATE IV INFUSION ADD-ON: CPT

## 2024-08-23 PROCEDURE — 36415 COLL VENOUS BLD VENIPUNCTURE: CPT | Performed by: EMERGENCY MEDICINE

## 2024-08-23 PROCEDURE — 83690 ASSAY OF LIPASE: CPT | Performed by: EMERGENCY MEDICINE

## 2024-08-23 PROCEDURE — 258N000003 HC RX IP 258 OP 636: Performed by: EMERGENCY MEDICINE

## 2024-08-23 PROCEDURE — 83605 ASSAY OF LACTIC ACID: CPT | Performed by: EMERGENCY MEDICINE

## 2024-08-23 PROCEDURE — 81001 URINALYSIS AUTO W/SCOPE: CPT | Performed by: EMERGENCY MEDICINE

## 2024-08-23 PROCEDURE — 87186 SC STD MICRODIL/AGAR DIL: CPT | Performed by: EMERGENCY MEDICINE

## 2024-08-23 PROCEDURE — 87637 SARSCOV2&INF A&B&RSV AMP PRB: CPT | Performed by: EMERGENCY MEDICINE

## 2024-08-23 PROCEDURE — 85041 AUTOMATED RBC COUNT: CPT | Performed by: EMERGENCY MEDICINE

## 2024-08-23 PROCEDURE — 258N000003 HC RX IP 258 OP 636: Performed by: NURSE PRACTITIONER

## 2024-08-23 PROCEDURE — 96365 THER/PROPH/DIAG IV INF INIT: CPT | Mod: 59

## 2024-08-23 PROCEDURE — 71260 CT THORAX DX C+: CPT | Mod: MG

## 2024-08-23 PROCEDURE — 71046 X-RAY EXAM CHEST 2 VIEWS: CPT

## 2024-08-23 RX ORDER — VANCOMYCIN HYDROCHLORIDE
1500 EVERY 24 HOURS
Status: DISCONTINUED | OUTPATIENT
Start: 2024-08-24 | End: 2024-08-25

## 2024-08-23 RX ORDER — ONDANSETRON 4 MG/1
4 TABLET, ORALLY DISINTEGRATING ORAL EVERY 6 HOURS PRN
Status: DISCONTINUED | OUTPATIENT
Start: 2024-08-23 | End: 2024-08-31 | Stop reason: HOSPADM

## 2024-08-23 RX ORDER — POLYETHYLENE GLYCOL 3350 17 G/17G
17 POWDER, FOR SOLUTION ORAL DAILY
Status: DISCONTINUED | OUTPATIENT
Start: 2024-08-24 | End: 2024-08-25

## 2024-08-23 RX ORDER — PIPERACILLIN SODIUM, TAZOBACTAM SODIUM 4; .5 G/20ML; G/20ML
4.5 INJECTION, POWDER, LYOPHILIZED, FOR SOLUTION INTRAVENOUS ONCE
Status: COMPLETED | OUTPATIENT
Start: 2024-08-23 | End: 2024-08-23

## 2024-08-23 RX ORDER — PIPERACILLIN SODIUM, TAZOBACTAM SODIUM 4; .5 G/20ML; G/20ML
4.5 INJECTION, POWDER, LYOPHILIZED, FOR SOLUTION INTRAVENOUS EVERY 6 HOURS
Status: DISCONTINUED | OUTPATIENT
Start: 2024-08-23 | End: 2024-08-29

## 2024-08-23 RX ORDER — ACETAMINOPHEN 325 MG/1
650 TABLET ORAL EVERY 4 HOURS PRN
Status: DISCONTINUED | OUTPATIENT
Start: 2024-08-23 | End: 2024-08-31 | Stop reason: HOSPADM

## 2024-08-23 RX ORDER — AMOXICILLIN 250 MG
2 CAPSULE ORAL 2 TIMES DAILY PRN
Status: DISCONTINUED | OUTPATIENT
Start: 2024-08-23 | End: 2024-08-31 | Stop reason: HOSPADM

## 2024-08-23 RX ORDER — VANCOMYCIN HYDROCHLORIDE
1500 ONCE
Status: COMPLETED | OUTPATIENT
Start: 2024-08-23 | End: 2024-08-23

## 2024-08-23 RX ORDER — AMOXICILLIN 250 MG
1 CAPSULE ORAL 2 TIMES DAILY PRN
Status: DISCONTINUED | OUTPATIENT
Start: 2024-08-23 | End: 2024-08-31 | Stop reason: HOSPADM

## 2024-08-23 RX ORDER — SODIUM CHLORIDE, SODIUM LACTATE, POTASSIUM CHLORIDE, CALCIUM CHLORIDE 600; 310; 30; 20 MG/100ML; MG/100ML; MG/100ML; MG/100ML
INJECTION, SOLUTION INTRAVENOUS CONTINUOUS
Status: ACTIVE | OUTPATIENT
Start: 2024-08-24 | End: 2024-08-24

## 2024-08-23 RX ORDER — ACETAMINOPHEN 650 MG/1
650 SUPPOSITORY RECTAL EVERY 4 HOURS PRN
Status: DISCONTINUED | OUTPATIENT
Start: 2024-08-23 | End: 2024-08-31 | Stop reason: HOSPADM

## 2024-08-23 RX ORDER — IOPAMIDOL 755 MG/ML
75 INJECTION, SOLUTION INTRAVASCULAR ONCE
Status: COMPLETED | OUTPATIENT
Start: 2024-08-23 | End: 2024-08-23

## 2024-08-23 RX ORDER — BISACODYL 10 MG
10 SUPPOSITORY, RECTAL RECTAL DAILY PRN
Status: DISCONTINUED | OUTPATIENT
Start: 2024-08-23 | End: 2024-08-31 | Stop reason: HOSPADM

## 2024-08-23 RX ORDER — ONDANSETRON 2 MG/ML
4 INJECTION INTRAMUSCULAR; INTRAVENOUS EVERY 6 HOURS PRN
Status: DISCONTINUED | OUTPATIENT
Start: 2024-08-23 | End: 2024-08-31 | Stop reason: HOSPADM

## 2024-08-23 RX ORDER — CALCIUM CARBONATE 500 MG/1
1000 TABLET, CHEWABLE ORAL 4 TIMES DAILY PRN
Status: DISCONTINUED | OUTPATIENT
Start: 2024-08-23 | End: 2024-08-31 | Stop reason: HOSPADM

## 2024-08-23 RX ADMIN — SODIUM CHLORIDE, POTASSIUM CHLORIDE, SODIUM LACTATE AND CALCIUM CHLORIDE: 600; 310; 30; 20 INJECTION, SOLUTION INTRAVENOUS at 23:52

## 2024-08-23 RX ADMIN — IOPAMIDOL 75 ML: 755 INJECTION, SOLUTION INTRAVENOUS at 15:48

## 2024-08-23 RX ADMIN — SODIUM CHLORIDE 1000 ML: 9 INJECTION, SOLUTION INTRAVENOUS at 14:34

## 2024-08-23 RX ADMIN — PIPERACILLIN AND TAZOBACTAM 4.5 G: 4; .5 INJECTION, POWDER, FOR SOLUTION INTRAVENOUS at 22:37

## 2024-08-23 RX ADMIN — PIPERACILLIN AND TAZOBACTAM 4.5 G: 4; .5 INJECTION, POWDER, FOR SOLUTION INTRAVENOUS at 16:19

## 2024-08-23 RX ADMIN — VANCOMYCIN HYDROCHLORIDE 1500 MG: 10 INJECTION, POWDER, LYOPHILIZED, FOR SOLUTION INTRAVENOUS at 16:54

## 2024-08-23 RX ADMIN — SODIUM CHLORIDE 63 ML: 9 INJECTION, SOLUTION INTRAVENOUS at 15:47

## 2024-08-23 ASSESSMENT — ACTIVITIES OF DAILY LIVING (ADL)
ADLS_ACUITY_SCORE: 41

## 2024-08-23 ASSESSMENT — COLUMBIA-SUICIDE SEVERITY RATING SCALE - C-SSRS: IS THE PATIENT NOT ABLE TO COMPLETE C-SSRS: UNABLE TO VERBALIZE

## 2024-08-23 NOTE — ED NOTES
Bed: ED25  Expected date:   Expected time:   Means of arrival:   Comments:  Kerri [age] m fever poss uti bedbound hx of tbi eta 1350

## 2024-08-23 NOTE — ED TRIAGE NOTES
Pt BIBA from home for being increasingly lethargic this morning and for a fever at home of 100.8F. In ED, rectal temperature 101F. Pt's mom is primary caregiver and reports pt to be less interactive with surroundings today. Pt was diagnosed last week with a UTI and prescribed antibiotics which he finished taking today. VSS     Triage Assessment (Adult)       Row Name 08/23/24 1407          Triage Assessment    Airway WDL WDL        Respiratory WDL    Respiratory WDL WDL        Skin Circulation/Temperature WDL    Skin Circulation/Temperature WDL X;temperature     Skin Temperature warm        Cardiac WDL    Cardiac WDL X     Cardiac Rhythm ST        Peripheral/Neurovascular WDL    Peripheral Neurovascular WDL WDL        Cognitive/Neuro/Behavioral WDL    Cognitive/Neuro/Behavioral WDL X     Level of Consciousness alert     Arousal Level opens eyes spontaneously     Orientation other (see comments)  nonverbal     Speech incoherent     Mood/Behavior calm;cooperative

## 2024-08-23 NOTE — PHARMACY-ADMISSION MEDICATION HISTORY
Pharmacist Admission Medication History    Admission medication history is complete. The information provided in this note is only as accurate as the sources available at the time of the update.    Information Source(s): Family member via in-person    Pertinent Information:     Changes made to PTA medication list:  Added: None  Deleted: Bactrim (completed 8/22/24, and amoxicillin (completed today AM)  Changed: None    Allergies reviewed with patient and updates made in EHR: no    Medication History Completed By: Akil Aliheyder, RP 8/23/2024 6:57 PM    PTA Med List   Medication Sig Last Dose    acetaminophen (TYLENOL) 325 MG tablet Take 650 mg by mouth every 6 hours as needed for mild pain prn    acetylcysteine (MUCOMYST) 20 % neb solution Take 2 mLs by nebulization 4 times daily (To use along with albuterol nebs) Past Week    albuterol (PROVENTIL) (5 MG/ML) 0.5% neb solution Take 0.5 mLs (2.5 mg) by nebulization every 6 hours as needed for shortness of breath, wheezing or cough 0700 1100 1500 1900 with mucomyst Past Week    bacitracin 500 UNIT/GM external ointment Apply topically daily as needed for wound care To PEG site.     Brivaracetam (BRIVIACT) 10 MG/ML solution Take 100 mg by mouth or Feeding Tube 2 times daily 0900, 2100 8/23/2024 at am    carBAMazepine (TEGRETOL) 100 MG/5ML suspension TAKE 7.5ML BY MOUTH OR FEEDING TUBE 3 TIMES DAILY AT 6:00, 12:00, AND 24:00 FOR SEIZURES 8/23/2024 at x2    carBAMazepine (TEGRETOL) 100 MG/5ML suspension Place 100 mg into Feeding Tube daily Take at 1800 8/22/2024    COMPOUNDED NON-CONTROLLED SUBSTANCE (CMPD RX) - PHARMACY TO MIX COMPOUNDED MEDICATION Scopolamine 0.4mg capsules - take  2 capsule by feeding tube three times daily as needed 8/23/2024 at 11am    Cyanocobalamin (VITAMIN B-12 PO) 1,000 mcg by Per Feeding Tube route daily Past Week    glycopyrrolate (ROBINUL) 1 MG tablet TAKE 1 TABLET(1 MG) BY MOUTH TWICE DAILY AS NEEDED FOR SECRETIONS     guaiFENesin (MUCINEX)  600 MG 12 hr tablet Take 1 tablet (600 mg) by mouth 2 times daily as needed for congestion     hydrocortisone (CORTAID) 1 % external cream Apply topically 2 times daily as needed Apply to reddened memo areas as needed     hydrocortisone (CORTEF) 5 MG tablet Take 15 mg (3 tablets) in the morning and 7.5 mg (1.5 tablet)  at 2:00 PM. During illness patient takes more as a stress dose. Please increase the dose as directed. 8/23/2024 at am    levothyroxine (SYNTHROID/LEVOTHROID) 100 MCG tablet Take 1 tablet (100 mcg) by mouth daily 8/23/2024    metoclopramide (REGLAN) 10 MG/10ML SOLN solution TAKE 10 ML BY MOUTH FOUR TIMES DAILY(BEFORE MEALS AND NIGHTLY) AT 8 AM, 12 PM, 4 PM, AND 8 PM. 8/23/2024 at am    miconazole (MICATIN) 2 % external powder Apply topically 2 times daily as needed     multivitamin, therapeutic (THERA-VIT) TABS tablet 1 tablet by Per Feeding Tube route daily 8/23/2024    mupirocin (BACTROBAN) 2 % external ointment APPLY TOPICALLY TO THE AFFECTED AREA TWICE DAILY AS NEEDED     pantoprazole (PROTONIX) 2 mg/mL SUSP suspension Place 20 mLs (40 mg) into Feeding Tube 2 times daily 8/23/2024 at am    polyethylene glycol (MIRALAX) 17 GM/Dose powder Take 17 g by mouth daily (Patient taking differently: Take 17 g by mouth daily as needed.)     potassium & sodium phosphates (NEUTRA-PHOS) 280-160-250 MG Packet Take 1 packet by mouth daily 8/23/2024    sodium chloride 1 GM tablet Place 1 tablet (1 g) into Feeding Tube 2 times daily Past Week at pink salt or NaCl tab    testosterone cypionate (DEPOTESTOSTERONE) 200 MG/ML injection INJECT 0.25ML IN THE MUSCLE ONCE A WEEK. Please schedule follow up for further refills. 8/18/2024    UNABLE TO FIND Take 0.25 teaspoonful by mouth daily MEDICATION NAME: pink salt 1/8 teaspoon po bid (instead of sodium bicarb). 8/23/2024 at pink salt or NaCl tab    vitamin C (ASCORBIC ACID) 1000 MG TABS 3,000 mg by Oral or Feeding Tube route daily 8/23/2024    vitamin D3 (CHOLECALCIFEROL)  2000 units (50 mcg) tablet 4,000 Units by Per Feeding Tube route daily 8/23/2024

## 2024-08-23 NOTE — ED NOTES
[facility]  ED Nurse Handoff Report    ED Chief complaint: Fever      ED Diagnosis:   Final diagnoses:   Aspiration pneumonia, unspecified aspiration pneumonia type, unspecified laterality, unspecified part of lung (H)       Code Status: Per admitting    Allergies:   Allergies   Allergen Reactions    Valproic Acid Other (See Comments)     Toxicity w/ bone marrow suspension, elevated ammonia levels    Toxicity with bone marrow suspension   Elevated ammonia levels    Poisens system    Scopolamine Hives     Hives with the patch - oral no problem    Vancomycin Other (See Comments)     Vancomycin flushing syndrome - pt tolerated dose at half rate.    Phenytoin Sodium        Patient Story:   Pt presents to the ED via EMS from home where his mom is his primary caregiver. Today pt was more lethargic and was running a fever which did not improve after Tylenol. Rectal temp is 101F rectal. Pt was seen last week and dx with a UTI. Pt took his last day of antibiotics today. Pt straight cathed in ED which was consistent with infection. Chest x-ray and CT had findings of aspiration pneumonia. Respiratory viral panel pending. Pt has a hx of a TBI. Given Zosyn and Vancomycin IV in ED along with a liter of NS.    Focused Assessment:    Neuro: Alert, localizes pain, hx of TBI, R side hemiplegia  Respiratory: recurrent pneumonia, on room air  Cardiology:  sinus, normotensive   Gastrointestinal: soft, non tender, non distended , GJ tube  Genitourinary/Renal:    Musculoskeletal: R sided hemiplegia  Skin: Intact skin   Lines: 20G L forearm    Labs Ordered and Resulted from Time of ED Arrival to Time of ED Departure   BASIC METABOLIC PANEL - Abnormal       Result Value    Sodium 140      Potassium 4.7      Chloride 104      Carbon Dioxide (CO2) 28      Anion Gap 8      Urea Nitrogen 18.9      Creatinine 1.05      GFR Estimate 80      Calcium 8.7 (*)     Glucose 100 (*)    CBC WITH PLATELETS AND DIFFERENTIAL - Abnormal     WBC Count 7.7      RBC Count 4.71      Hemoglobin 13.3      Hematocrit 41.4      MCV 88      MCH 28.2      MCHC 32.1      RDW 17.2 (*)     Platelet Count 244      % Neutrophils 52      % Lymphocytes 30      % Monocytes 13      % Eosinophils 5      % Basophils 1      % Immature Granulocytes 0      NRBCs per 100 WBC 0      Absolute Neutrophils 4.0      Absolute Lymphocytes 2.3      Absolute Monocytes 1.0      Absolute Eosinophils 0.4      Absolute Basophils 0.1      Absolute Immature Granulocytes 0.0      Absolute NRBCs 0.0     LIPASE - Abnormal    Lipase 76 (*)    ROUTINE UA WITH MICROSCOPIC REFLEX TO CULTURE - Abnormal    Color Urine Yellow      Appearance Urine Clear      Glucose Urine Negative      Bilirubin Urine Negative      Ketones Urine Negative      Specific Gravity Urine 1.032      Blood Urine Negative      pH Urine 8.5 (*)     Protein Albumin Urine 30 (*)     Urobilinogen Urine 2.0      Nitrite Urine Negative      Leukocyte Esterase Urine Large (*)     Bacteria Urine Few (*)     Mucus Urine Present (*)     Amorphous Crystals Urine Few (*)     RBC Urine 3 (*)     WBC Urine 6 (*)     Squamous Epithelials Urine <1     HEPATIC FUNCTION PANEL - Normal    Protein Total 7.4      Albumin 3.6      Bilirubin Total 0.2      Alkaline Phosphatase 91      AST 41      ALT 25      Bilirubin Direct <0.20     LACTIC ACID WHOLE BLOOD WITH 1X REPEAT IN 2 HR WHEN >2 - Normal    Lactic Acid, Initial 1.2     INFLUENZA A/B, RSV, & SARS-COV2 PCR   BLOOD CULTURE   URINE CULTURE        CT Chest/Abdomen/Pelvis w Contrast   Final Result   IMPRESSION:   1.  Persistent bibasilar pulmonary consolidation most prominent at the   right lower lobe consistent with acute airspace disease such as   pneumonia. Aspirated material visualized within the trachea and right   main bronchus. Therefore the airspace disease could represent   aspiration pneumonia.   2.  Large stool distends the rectum and there is rectal wall   thickening that could  represent stercoral colitis.   3.  Stable cystic lesion at the pancreas. Correlate with nonurgent   pancreas MRI.       ZAIRE HILL MD            SYSTEM ID:  [id number]      XR Chest 2 Views   Final Result   IMPRESSION: Continued low lung volumes and elevated right   hemidiaphragm. There are no acute infiltrates. The cardiac silhouette   is not enlarged. Pulmonary vasculature is unremarkable.      BERT ESPINOZA MD            SYSTEM ID:  [id number]            Treatments and/or interventions provided:      Medications   sodium chloride (PF) 0.9% PF flush 3 mL (has no administration in time range)   sodium chloride (PF) 0.9% PF flush 3 mL (3 mLs Intracatheter $Given 8/23/24 1437)   vancomycin (VANCOCIN) 1,500 mg in 0.9% NaCl 250 mL intermittent infusion (1,500 mg Intravenous $New Bag 8/23/24 1654)   sodium chloride 0.9% BOLUS 1,000 mL (1,000 mLs Intravenous $New Bag 8/23/24 1434)   piperacillin-tazobactam (ZOSYN) 4.5 g vial to attach to  mL bag (0 g Intravenous Stopped 8/23/24 1657)   iopamidol (ISOVUE-370) solution 75 mL (75 mLs Intravenous $Given 8/23/24 1548)   sodium chloride 0.9 % bag 500 mL for CT scan flush use (63 mLs Intravenous $Given 8/23/24 1547)        Patient's response to treatments and/or interventions:   Resting comfortably    To be done/followed up on inpatient unit:    See any in-patient orders    Does this patient have any cognitive concerns?: Hx Head Trauma    Activity level - Baseline/Home:    Total Care    Activity Level - Current:     Total Care    Patient's Preferred language: English     Needed?: No    Isolation: COVID r/o and special precautions  Infection:   COVID r/o and special precautions  Patient tested for COVID 19 prior to admission: YES    Bariatric?: No    Vital Signs:   Vitals:    08/23/24 1407 08/23/24 1430 08/23/24 1600 08/23/24 1630   BP:  105/67 114/69 110/64   Pulse: 112 105 89 93   Resp: 25 23 19 13   Temp:       TempSrc:       SpO2: 93% 92% 98% 97%       Cardiac  Rhythm:Cardiac Rhythm: Normal sinus rhythm    Was the PSS-3 completed:   Yes    Family Comments: Contact mother, Savannah, with updates: [phone]    For the majority of the shift this patient's behavior was Green.   Behavioral interventions performed were none    ED NURSE PHONE NUMBER: *[phone]
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
## 2024-08-24 LAB
ANION GAP SERPL CALCULATED.3IONS-SCNC: 7 MMOL/L (ref 7–15)
BUN SERPL-MCNC: 13.7 MG/DL (ref 8–23)
CALCIUM SERPL-MCNC: 8.3 MG/DL (ref 8.8–10.4)
CHLORIDE SERPL-SCNC: 106 MMOL/L (ref 98–107)
CREAT SERPL-MCNC: 0.93 MG/DL (ref 0.67–1.17)
EGFRCR SERPLBLD CKD-EPI 2021: >90 ML/MIN/1.73M2
ERYTHROCYTE [DISTWIDTH] IN BLOOD BY AUTOMATED COUNT: 16.9 % (ref 10–15)
GLUCOSE BLDC GLUCOMTR-MCNC: 115 MG/DL (ref 70–99)
GLUCOSE BLDC GLUCOMTR-MCNC: 151 MG/DL (ref 70–99)
GLUCOSE SERPL-MCNC: 88 MG/DL (ref 70–99)
HCO3 SERPL-SCNC: 26 MMOL/L (ref 22–29)
HCT VFR BLD AUTO: 41.8 % (ref 40–53)
HGB BLD-MCNC: 13.4 G/DL (ref 13.3–17.7)
MCH RBC QN AUTO: 28.5 PG (ref 26.5–33)
MCHC RBC AUTO-ENTMCNC: 32.1 G/DL (ref 31.5–36.5)
MCV RBC AUTO: 89 FL (ref 78–100)
MRSA DNA SPEC QL NAA+PROBE: POSITIVE
PHOSPHATE SERPL-MCNC: 2.7 MG/DL (ref 2.5–4.5)
PLATELET # BLD AUTO: 232 10E3/UL (ref 150–450)
POTASSIUM SERPL-SCNC: 3.9 MMOL/L (ref 3.4–5.3)
RBC # BLD AUTO: 4.7 10E6/UL (ref 4.4–5.9)
SA TARGET DNA: POSITIVE
SODIUM SERPL-SCNC: 139 MMOL/L (ref 135–145)
WBC # BLD AUTO: 6.3 10E3/UL (ref 4–11)

## 2024-08-24 PROCEDURE — 80048 BASIC METABOLIC PNL TOTAL CA: CPT | Performed by: NURSE PRACTITIONER

## 2024-08-24 PROCEDURE — 250N000011 HC RX IP 250 OP 636: Performed by: INTERNAL MEDICINE

## 2024-08-24 PROCEDURE — 87640 STAPH A DNA AMP PROBE: CPT

## 2024-08-24 PROCEDURE — 250N000011 HC RX IP 250 OP 636: Performed by: EMERGENCY MEDICINE

## 2024-08-24 PROCEDURE — 99233 SBSQ HOSP IP/OBS HIGH 50: CPT | Performed by: INTERNAL MEDICINE

## 2024-08-24 PROCEDURE — 258N000003 HC RX IP 258 OP 636: Performed by: INTERNAL MEDICINE

## 2024-08-24 PROCEDURE — 250N000013 HC RX MED GY IP 250 OP 250 PS 637: Performed by: INTERNAL MEDICINE

## 2024-08-24 PROCEDURE — 250N000009 HC RX 250: Performed by: INTERNAL MEDICINE

## 2024-08-24 PROCEDURE — 85014 HEMATOCRIT: CPT | Performed by: NURSE PRACTITIONER

## 2024-08-24 PROCEDURE — 36415 COLL VENOUS BLD VENIPUNCTURE: CPT | Performed by: NURSE PRACTITIONER

## 2024-08-24 PROCEDURE — 250N000013 HC RX MED GY IP 250 OP 250 PS 637: Performed by: NURSE PRACTITIONER

## 2024-08-24 PROCEDURE — 87641 MR-STAPH DNA AMP PROBE: CPT

## 2024-08-24 PROCEDURE — 84100 ASSAY OF PHOSPHORUS: CPT | Performed by: INTERNAL MEDICINE

## 2024-08-24 PROCEDURE — 120N000001 HC R&B MED SURG/OB

## 2024-08-24 RX ORDER — DEXTROSE MONOHYDRATE 100 MG/ML
INJECTION, SOLUTION INTRAVENOUS CONTINUOUS PRN
Status: DISCONTINUED | OUTPATIENT
Start: 2024-08-24 | End: 2024-08-31 | Stop reason: HOSPADM

## 2024-08-24 RX ORDER — ACETYLCYSTEINE 200 MG/ML
2 SOLUTION ORAL; RESPIRATORY (INHALATION) 4 TIMES DAILY
Status: DISCONTINUED | OUTPATIENT
Start: 2024-08-24 | End: 2024-08-31 | Stop reason: HOSPADM

## 2024-08-24 RX ORDER — MULTIVITAMIN,THERAPEUTIC
1 TABLET ORAL DAILY
Status: DISCONTINUED | OUTPATIENT
Start: 2024-08-24 | End: 2024-08-25

## 2024-08-24 RX ORDER — POLYETHYLENE GLYCOL 3350 17 G/17G
17 POWDER, FOR SOLUTION ORAL DAILY PRN
Status: DISCONTINUED | OUTPATIENT
Start: 2024-08-24 | End: 2024-08-31 | Stop reason: HOSPADM

## 2024-08-24 RX ORDER — SODIUM CHLORIDE 1 G/1
1 TABLET ORAL 2 TIMES DAILY
Status: DISCONTINUED | OUTPATIENT
Start: 2024-08-24 | End: 2024-08-31 | Stop reason: HOSPADM

## 2024-08-24 RX ORDER — SENNOSIDES 8.6 MG
1 TABLET ORAL 2 TIMES DAILY PRN
Status: DISCONTINUED | OUTPATIENT
Start: 2024-08-24 | End: 2024-08-24

## 2024-08-24 RX ORDER — BACITRACIN ZINC 500 [USP'U]/G
OINTMENT TOPICAL DAILY PRN
Status: DISCONTINUED | OUTPATIENT
Start: 2024-08-24 | End: 2024-08-31 | Stop reason: HOSPADM

## 2024-08-24 RX ORDER — CARBAMAZEPINE 100 MG/5ML
150 SUSPENSION ORAL 3 TIMES DAILY
Status: DISCONTINUED | OUTPATIENT
Start: 2024-08-24 | End: 2024-08-31 | Stop reason: HOSPADM

## 2024-08-24 RX ORDER — LEVOTHYROXINE SODIUM 100 UG/1
100 TABLET ORAL DAILY
Status: DISCONTINUED | OUTPATIENT
Start: 2024-08-24 | End: 2024-08-31 | Stop reason: HOSPADM

## 2024-08-24 RX ORDER — GUAIFENESIN 600 MG/1
600 TABLET, EXTENDED RELEASE ORAL 2 TIMES DAILY PRN
Status: DISCONTINUED | OUTPATIENT
Start: 2024-08-24 | End: 2024-08-31 | Stop reason: HOSPADM

## 2024-08-24 RX ORDER — METOCLOPRAMIDE HYDROCHLORIDE 5 MG/5ML
10 SOLUTION ORAL
Status: DISCONTINUED | OUTPATIENT
Start: 2024-08-24 | End: 2024-08-31 | Stop reason: HOSPADM

## 2024-08-24 RX ORDER — CARBAMAZEPINE 100 MG/5ML
100 SUSPENSION ORAL DAILY
Status: DISCONTINUED | OUTPATIENT
Start: 2024-08-24 | End: 2024-08-31 | Stop reason: HOSPADM

## 2024-08-24 RX ORDER — ACETAMINOPHEN 325 MG/1
650 TABLET ORAL EVERY 6 HOURS PRN
Status: DISCONTINUED | OUTPATIENT
Start: 2024-08-24 | End: 2024-08-31 | Stop reason: ALTCHOICE

## 2024-08-24 RX ORDER — LANOLIN ALCOHOL/MO/W.PET/CERES
1000 CREAM (GRAM) TOPICAL DAILY
Status: DISCONTINUED | OUTPATIENT
Start: 2024-08-24 | End: 2024-08-31 | Stop reason: HOSPADM

## 2024-08-24 RX ADMIN — MULTIVITAMIN TABLET 1 TABLET: TABLET at 13:49

## 2024-08-24 RX ADMIN — METOCLOPRAMIDE HYDROCHLORIDE 10 MG: 5 SOLUTION ORAL at 21:51

## 2024-08-24 RX ADMIN — CARBAMAZEPINE 100 MG: 100 SUSPENSION ORAL at 17:34

## 2024-08-24 RX ADMIN — POTASSIUM & SODIUM PHOSPHATES POWDER PACK 280-160-250 MG 1 PACKET: 280-160-250 PACK at 13:49

## 2024-08-24 RX ADMIN — VANCOMYCIN HYDROCHLORIDE 1500 MG: 10 INJECTION, POWDER, LYOPHILIZED, FOR SOLUTION INTRAVENOUS at 15:06

## 2024-08-24 RX ADMIN — CYANOCOBALAMIN TAB 1000 MCG 1000 MCG: 1000 TAB at 13:50

## 2024-08-24 RX ADMIN — BRIVARACETAM 100 MG: 10 SOLUTION ORAL at 21:51

## 2024-08-24 RX ADMIN — SODIUM PHOSPHATE, MONOBASIC, MONOHYDRATE AND SODIUM PHOSPHATE, DIBASIC, ANHYDROUS 9 MMOL: 142; 276 INJECTION, SOLUTION INTRAVENOUS at 00:08

## 2024-08-24 RX ADMIN — Medication 40 MG: at 21:51

## 2024-08-24 RX ADMIN — PIPERACILLIN AND TAZOBACTAM 4.5 G: 4; .5 INJECTION, POWDER, FOR SOLUTION INTRAVENOUS at 11:01

## 2024-08-24 RX ADMIN — SODIUM CHLORIDE TAB 1 GM 1 G: 1 TAB at 21:50

## 2024-08-24 RX ADMIN — PIPERACILLIN AND TAZOBACTAM 4.5 G: 4; .5 INJECTION, POWDER, FOR SOLUTION INTRAVENOUS at 18:39

## 2024-08-24 RX ADMIN — LEVOTHYROXINE SODIUM 100 MCG: 100 TABLET ORAL at 13:49

## 2024-08-24 RX ADMIN — PIPERACILLIN AND TAZOBACTAM 4.5 G: 4; .5 INJECTION, POWDER, FOR SOLUTION INTRAVENOUS at 06:22

## 2024-08-24 RX ADMIN — CARBAMAZEPINE 150 MG: 100 SUSPENSION ORAL at 15:00

## 2024-08-24 RX ADMIN — Medication 40 MG: at 14:59

## 2024-08-24 RX ADMIN — METOCLOPRAMIDE HYDROCHLORIDE 10 MG: 5 SOLUTION ORAL at 15:05

## 2024-08-24 RX ADMIN — POLYETHYLENE GLYCOL 3350 17 G: 17 POWDER, FOR SOLUTION ORAL at 10:44

## 2024-08-24 RX ADMIN — BRIVARACETAM 100 MG: 10 SOLUTION ORAL at 15:02

## 2024-08-24 ASSESSMENT — ACTIVITIES OF DAILY LIVING (ADL)
ADLS_ACUITY_SCORE: 67
ADLS_ACUITY_SCORE: 55
ADLS_ACUITY_SCORE: 51
ADLS_ACUITY_SCORE: 67
ADLS_ACUITY_SCORE: 67
ADLS_ACUITY_SCORE: 55
ADLS_ACUITY_SCORE: 51
ADLS_ACUITY_SCORE: 51
DEPENDENT_IADLS:: CLEANING;COOKING;LAUNDRY;SHOPPING;MEAL PREPARATION;MEDICATION MANAGEMENT;MONEY MANAGEMENT;TRANSPORTATION;INCONTINENCE
ADLS_ACUITY_SCORE: 51
ADLS_ACUITY_SCORE: 67
ADLS_ACUITY_SCORE: 41
ADLS_ACUITY_SCORE: 67
ADLS_ACUITY_SCORE: 67
ADLS_ACUITY_SCORE: 63
ADLS_ACUITY_SCORE: 55
ADLS_ACUITY_SCORE: 63
ADLS_ACUITY_SCORE: 67
ADLS_ACUITY_SCORE: 67

## 2024-08-24 NOTE — PLAN OF CARE
Shift Note [id number]:     Patient is alert and oriented xUTA- patient non-verbal.   Mobility: Ax2 turn and repo, WC at baseline  Vitals/Tele: low grade temp. FLACC pain score 0/10  Aggression Stop Light: green    Shift Summary: incontinence of bowel and bladder. Multiple liquid stools on shift. Colonrectal consulted. TF started per orders. Continues on IV ABX therapy. Continues on contact precautions. Nasal swab positive for MRSA. Patient refuses oral suctioning for secretions.     Discharge Plan: pending patient progress.    See Flow sheets for assessment
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
## 2024-08-24 NOTE — PLAN OF CARE
Summary:  Presented with fever and more lethargic; Aspiration pneumonia  DATE & TIME: 8/23/24 2315-8/24/24 0730 Cognitive Concerns/ Orientation : Alert, HECTOR orientation, non verbal; gives thumbs up and shakes head   BEHAVIOR & AGGRESSION TOOL COLOR: Green  CIWA SCORE: NA   ABNL VS/O2: VSS RA  MOBILITY: Ax2 lift, turn/repo  PAIN MANAGMENT: No non verbal indicators of pain noted  DIET: NPO; Nutrition consult placed for tube feedings  BOWEL/BLADDER: Incontinent of B/B, large BM x2 this shift.  ABNL LAB/BG: Phos 2.1, replacing  DRAIN/DEVICES: New PIV L arm with LR infusing @ 100 mL/hr  TELEMETRY RHYTHM: NA  SKIN: Scattered scabs and bruises, reddened groin and blanchable redness to coccyx, mepilex was in place but removed d/t incontinent BMs, barrier cream applied.  TESTS/PROCEDURES: None  D/C DAY/GOALS/PLACE: Discharge pending   OTHER IMPORTANT INFO: Contact precautions maintained; continues on IV Zosyn. Completed oral cares as pt allowed.  Pt aggressive at times during cares.

## 2024-08-24 NOTE — CONSULTS
"CLINICAL NUTRITION SERVICES  -  ASSESSMENT NOTE    RECOMMENDATIONS FOR MD/PROVIDER TO ORDER:   - Pt has required scheduled Phos replacements in the past.    Recommendations Ordered by Registered Dietitian (RD):   Via [employer]   Jevity 1.5 @ 60 mL/hr x 22 hours (hold for Synthroid)  Provides 1320 mL, 1980 kcal, 85 g protein, 1003 mL free water, 28 g fiber daily.   Free Water Flush: 120 mL q 4 hours for hydration and patency    Begin TF @ 30 mL/hr, advance to goal after 4 hours.    Malnutrition:   % Weight Loss:  None noted  % Intake:  Decreased intake does not meet criteria for malnutrition - TF interruptions   Subcutaneous Fat Loss:  chronic low stores at baseline   Muscle Loss:  chronic low muscle mass at baseline   Fluid Retention:  None noted    Malnutrition Diagnosis: Patient does not meet two of the above criteria necessary for diagnosing malnutrition     REASON FOR ASSESSMENT  Keyon Farias is a 64 year old male seen by Registered Dietitian for Provider Order - Registered Dietitian to Assess and Order TF per Medical Nutrition Therapy Protocol    NUTRITION HISTORY  - Information obtained from chart.   - Admitted from home with fever. H/o TBI, aphasia.   - Well-known from prior frequent admissions.   - Reliant on tube feeds, with a GJT in place. Frequent issues with aspiration pneumonia.     CURRENT NUTRITION ORDERS  Diet Order:     NPO     Current Intake/Tolerance:  N/a    NUTRITION FOCUSED PHYSICAL ASSESSMENT FOR DIAGNOSING MALNUTRITION)  Yes         Observed:    Chronically low stores at baseline     Obtained from Chart/Interdisciplinary Team:  8/24 - SKIN assess per RN \"Scattered scabs and bruises, reddened groin and blanchable redness to coccyx, mepilex was in place but removed d/t incontinent BMs, barrier cream applied.\"    ANTHROPOMETRICS  Height: 5' 10\"  Weight: 150 lbs 0 oz (68 kg)   Body mass index is 21.52 kg/m .  Weight Status:  Normal BMI  IBW: 75.5 kg   % IBW: 90%  Weight History: Consistent with past " trends.   Wt Readings from Last 10 Encounters:   08/23/24 68 kg (150 lb)   08/05/24 67.6 kg (149 lb)   07/24/24 67.9 kg (149 lb 11.1 oz)   07/12/24 69.4 kg (153 lb)   06/04/24 74.8 kg (165 lb)   05/05/24 75.1 kg (165 lb 9.1 oz)   04/20/24 71.2 kg (156 lb 15.6 oz)   03/25/24 68.9 kg (151 lb 14.4 oz)   01/26/24 68.1 kg (150 lb 2.1 oz)   01/11/24 68.5 kg (151 lb)     LABS  Labs reviewed  Phos 2.1 (L)    MEDICATIONS  Medications reviewed  NeutraPhos replacement given   LR @ 100 mL/hr     ASSESSED NUTRITION NEEDS PER APPROVED PRACTICE GUIDELINES:  Dosing Weight 68 kg  Estimated Energy Needs: 6041-2398 kcals (25-30 Kcal/Kg)  Justification: maintenance  Estimated Protein Needs:  grams protein (1.2-1.5 g pro/Kg)  Justification: preservation of lean body mass  Estimated Fluid Needs: 1 mL/kcal   Justification: maintenance    MALNUTRITION:  % Weight Loss:  None noted  % Intake:  Decreased intake does not meet criteria for malnutrition - TF interruptions   Subcutaneous Fat Loss:  chronic low stores at baseline   Muscle Loss:  chronic low muscle mass at baseline   Fluid Retention:  None noted    Malnutrition Diagnosis: Patient does not meet two of the above criteria necessary for diagnosing malnutrition    NUTRITION DIAGNOSIS:  Inadequate protein-energy intake related to transfer of care requiring TF to be held as evidenced by TF on hold since admission.     NUTRITION INTERVENTIONS  Recommendations / Nutrition Prescription  Restart TF as follows:     Via [employer]   Jevity 1.5 @ 60 mL/hr x 22 hours (hold for Synthroid)  Provides 1320 mL, 1980 kcal, 85 g protein, 1003 mL free water, 28 g fiber daily.   Free Water Flush: 120 mL q 4 hours for hydration and patency    Begin TF @ 30 mL/hr, advance to goal after 4 hours.     Implementation  Nutrition education: Per Provider order if indicated   Collaboration and Referral of Nutrition care: d/w PharmD (Synthroid not yet on MAR but will be added per MD discretion)    Nutrition  Goals  TF @ goal to provide % estimated needs.     MONITORING AND EVALUATION:  Progress towards goals will be monitored and evaluated per protocol and Practice Guidelines    Marisel Fernández RD, LD  Pager: [phone]

## 2024-08-24 NOTE — PHARMACY-VANCOMYCIN DOSING SERVICE
"Pharmacy Vancomycin Initial Note  Date of Service 2024  Patient's  1960  64 year old, male    Indication: Community Acquired Pneumonia    Current estimated CrCl = Estimated Creatinine Clearance: 69.7 mL/min (based on SCr of 1.05 mg/dL).    Creatinine for last 3 days  2024:  2:27 PM Creatinine 1.05 mg/dL    Recent Vancomycin Level(s) for last 3 days  No results found for requested labs within last 3 days.      Vancomycin IV Administrations (past 72 hours)                     vancomycin (VANCOCIN) 1,500 mg in 0.9% NaCl 250 mL intermittent infusion (mg) 1,500 mg New Bag 24 1654                    Nephrotoxins and other renal medications (From now, onward)      Start     Dose/Rate Route Frequency Ordered Stop    24 2200  piperacillin-tazobactam (ZOSYN) 4.5 g vial to attach to  mL bag        Note to Pharmacy: For [facility], [facility] and [facility]: For Zosyn-naive patients, use the \"Zosyn initial dose + extended infusion\" order panel.    4.5 g  over 30 Minutes Intravenous EVERY 6 HOURS 24 2159            Contrast Orders - past 72 hours (72h ago, onward)      Start     Dose/Rate Route Frequency Stop    24 1530  iopamidol (ISOVUE-370) solution 75 mL         75 mL Intravenous ONCE 24 1548            [employer] Prediction of Planned Initial Vancomycin Regimen  Loading dose: N/A  Regimen: 1500 mg IV every 24 hours.  Start time: 16:54 on 2024  Exposure target: AUC24 (range)400-600 mg/L.hr   AUC24,ss: 506 mg/L.hr  Probability of AUC24 > 400: 75 %  Ctrough,ss: 13.8 mg/L  Probability of Ctrough,ss > 20: 21 %  Probability of nephrotoxicity (Lodise ERNESTO ): 9 %            Plan:  Start vancomycin  1500 mg IV q24h. Vancomycin flushing syndrome - pt tolerated dose at half rate.  Vancomycin monitoring method: AUC  Vancomycin therapeutic monitoring goal: 400-600 mg*h/L  Pharmacy will check vancomycin levels as appropriate in 1-3 Days.    Serum creatinine levels will be " ordered daily for the first week of therapy and at least twice weekly for subsequent weeks.      Mariana Basilio, [facility]

## 2024-08-24 NOTE — CONSULTS
Care Management Initial Consult    General Information  Assessment completed with: Parents,    Type of CM/SW Visit: Initial Assessment  Primary Care Provider verified and updated as needed: Yes (Dr. Queen)   Readmission within the last 30 days: previous discharge plan unsuccessful   Return Category: Exacerbation of disease  Reason for Consult: discharge planning  Advance Care Planning:    guardian documents on file in EPIC     Communication Assessment  Patient's communication style: spoken language (English or Bilingual)      Cognitive  Cognitive/Neuro/Behavioral: .WDL except  Level of Consciousness: alert  Arousal Level: arouses to voice  Orientation: other (see comments) (HECTOR-nonverbal)  Mood/Behavior: calm  Best Language: 2 - Severe aphasia  Speech: incoherent    Living Environment:   People in home: parent(s)  [place] and also a PCA  Current living Arrangements: house      Able to return to prior arrangements: yes     Family/Social Support:  Care provided by: homecare agency  Provides care for: no one  Marital Status: Single  Support system: Parent(s), PCA          Description of Support System: Supportive, Involved    Support Assessment: Adequate family and caregiver support    Current Resources:   Patient receiving home care services: Yes  Skilled Home Care Services: Skilled Nursing (via [facility] [phone])  Community Resources: [facility] ( [place] [phone])  Equipment currently used at home: hospital bed, lift device, wheelchair, manual  Supplies currently used at home: Incontinence Supplies, Nutritional Supplements, Enteral Nutrition & Supplies    Employment/Financial:  Employment Status: disabled     Financial Concerns: none   Finance Comments: [place]  Does the patient's insurance plan have a 3 day qualifying hospital stay waiver?  Yes   Which insurance plan 3 day waiver is available? [employer]  Will the waiver be used for  [Nutrition/ Exercise/ Weight Management] : nutrition, exercise, weight management "post-acute placement? No    Lifestyle & Psychosocial Needs:  Social Determinants of Health     Food Insecurity: Not on file   Depression: Not at risk (8/5/2024)    PHQ-2     PHQ-2 Score: 1   Housing Stability: Not on file   Tobacco Use: Medium Risk (8/23/2024)    Patient History     Smoking Tobacco Use: Former     Smokeless Tobacco Use: Never     Functional Status:  Prior to admission patient needed assistance:   Dependent ADLs:: Bathing, Dressing, Eating, Grooming, Incontinence, Positioning, Transfers, Wheelchair-with assist, Toileting  Dependent IADLs:: Cleaning, Cooking, Laundry, Shopping, Meal Preparation, Medication Management, Money Management, Transportation, Incontinence  Assesssment of Functional Status: At functional baseline    Mental Health Status:  Mental Health Status: No Current Concerns       Chemical Dependency Status:  Chemical Dependency Status: No Current Concerns           Values/Beliefs:  Spiritual, Cultural Beliefs, Nondenominational Practices, Values that affect care: no             Additional Information:  Spoke with patient's mom via phone, introduced self and role in discharge planning. Confirmed the information in the above assessment, please see each section for helpful details.     Pt is admitted 8/23/24 with RLL pneumonia and also had CRS consult for for workup of his constipation.      At baseline pt continues to lives with his mother at their accessible home ([address] [place] [id number]); she and PCAs (scheduled 12am-8am, 5 hours per day / 4 days per week - Saturday thru Tuesday).  Skilled home nursing is provided by [facility].      Last visit pt mom requested nebulizer solution.  When CM-RN called today she did confirm they got that.  Her current continued need is getting daily home nursing or home health aide, \"before the pandemic we had 12 hours per day every day and we just can't find it these days.\"  She was trying to at least get the other 3 days per week PCA (Wed thru " [Vitamins/Supplements] : vitamins/supplements Fri) to get 5 hours per day / 7 days per week coverage.  She is working with the clinic to get G tube feeding to be organic food. He will need a stretcher ride home at discharge.      CM-RN assured pt mom CM team will continue to follow for discharge planning.     Brooke Leon RN, BSN, PHN  [facility]  Inpatient Care Management - FLOAT  Please reach out via vocera or contact   66 CM RN Mobile: [phone] daily 7:30-4:00         [Breast Self Exam] : breast self exam [Contraception/ Emergency Contraception/ Safe Sexual Practices] : contraception, emergency contraception, safe sexual practices

## 2024-08-25 LAB
BACTERIA UR CULT: ABNORMAL
GLUCOSE BLDC GLUCOMTR-MCNC: 115 MG/DL (ref 70–99)
PHOSPHATE SERPL-MCNC: 2.8 MG/DL (ref 2.5–4.5)

## 2024-08-25 PROCEDURE — 250N000011 HC RX IP 250 OP 636: Performed by: EMERGENCY MEDICINE

## 2024-08-25 PROCEDURE — 94640 AIRWAY INHALATION TREATMENT: CPT | Mod: 76

## 2024-08-25 PROCEDURE — 999N000157 HC STATISTIC RCP TIME EA 10 MIN

## 2024-08-25 PROCEDURE — 250N000013 HC RX MED GY IP 250 OP 250 PS 637: Performed by: NURSE PRACTITIONER

## 2024-08-25 PROCEDURE — 94640 AIRWAY INHALATION TREATMENT: CPT

## 2024-08-25 PROCEDURE — 120N000001 HC R&B MED SURG/OB

## 2024-08-25 PROCEDURE — 99232 SBSQ HOSP IP/OBS MODERATE 35: CPT | Performed by: INTERNAL MEDICINE

## 2024-08-25 PROCEDURE — 250N000013 HC RX MED GY IP 250 OP 250 PS 637: Performed by: INTERNAL MEDICINE

## 2024-08-25 PROCEDURE — 36415 COLL VENOUS BLD VENIPUNCTURE: CPT | Performed by: INTERNAL MEDICINE

## 2024-08-25 PROCEDURE — 250N000009 HC RX 250: Performed by: INTERNAL MEDICINE

## 2024-08-25 PROCEDURE — 84100 ASSAY OF PHOSPHORUS: CPT | Performed by: INTERNAL MEDICINE

## 2024-08-25 RX ORDER — IPRATROPIUM BROMIDE AND ALBUTEROL SULFATE 2.5; .5 MG/3ML; MG/3ML
3 SOLUTION RESPIRATORY (INHALATION)
Status: COMPLETED | OUTPATIENT
Start: 2024-08-25 | End: 2024-08-26

## 2024-08-25 RX ORDER — GUAIFENESIN 600 MG/1
15 TABLET, EXTENDED RELEASE ORAL DAILY
Status: DISCONTINUED | OUTPATIENT
Start: 2024-08-25 | End: 2024-08-31 | Stop reason: HOSPADM

## 2024-08-25 RX ORDER — ALBUTEROL SULFATE 5 MG/ML
2.5 SOLUTION RESPIRATORY (INHALATION) EVERY 6 HOURS PRN
Status: DISCONTINUED | OUTPATIENT
Start: 2024-08-25 | End: 2024-08-28

## 2024-08-25 RX ORDER — POLYETHYLENE GLYCOL 3350 17 G/17G
17 POWDER, FOR SOLUTION ORAL 2 TIMES DAILY
Status: DISCONTINUED | OUTPATIENT
Start: 2024-08-25 | End: 2024-08-31 | Stop reason: HOSPADM

## 2024-08-25 RX ADMIN — Medication 40 MG: at 10:12

## 2024-08-25 RX ADMIN — IPRATROPIUM BROMIDE AND ALBUTEROL SULFATE 3 ML: .5; 3 SOLUTION RESPIRATORY (INHALATION) at 08:39

## 2024-08-25 RX ADMIN — IPRATROPIUM BROMIDE AND ALBUTEROL SULFATE 3 ML: .5; 3 SOLUTION RESPIRATORY (INHALATION) at 19:52

## 2024-08-25 RX ADMIN — Medication 40 MG: at 21:19

## 2024-08-25 RX ADMIN — METOCLOPRAMIDE HYDROCHLORIDE 10 MG: 5 SOLUTION ORAL at 21:19

## 2024-08-25 RX ADMIN — CARBAMAZEPINE 100 MG: 100 SUSPENSION ORAL at 17:01

## 2024-08-25 RX ADMIN — CYANOCOBALAMIN TAB 1000 MCG 1000 MCG: 1000 TAB at 10:13

## 2024-08-25 RX ADMIN — PIPERACILLIN AND TAZOBACTAM 4.5 G: 4; .5 INJECTION, POWDER, FOR SOLUTION INTRAVENOUS at 17:00

## 2024-08-25 RX ADMIN — BRIVARACETAM 100 MG: 10 SOLUTION ORAL at 10:13

## 2024-08-25 RX ADMIN — METOCLOPRAMIDE HYDROCHLORIDE 10 MG: 5 SOLUTION ORAL at 06:30

## 2024-08-25 RX ADMIN — CARBAMAZEPINE 150 MG: 100 SUSPENSION ORAL at 00:37

## 2024-08-25 RX ADMIN — POTASSIUM & SODIUM PHOSPHATES POWDER PACK 280-160-250 MG 1 PACKET: 280-160-250 PACK at 10:13

## 2024-08-25 RX ADMIN — ACETAMINOPHEN 650 MG: 325 TABLET ORAL at 16:54

## 2024-08-25 RX ADMIN — METOCLOPRAMIDE HYDROCHLORIDE 10 MG: 5 SOLUTION ORAL at 16:54

## 2024-08-25 RX ADMIN — IPRATROPIUM BROMIDE AND ALBUTEROL SULFATE 3 ML: .5; 3 SOLUTION RESPIRATORY (INHALATION) at 15:43

## 2024-08-25 RX ADMIN — ACETYLCYSTEINE 2 ML: 200 SOLUTION ORAL; RESPIRATORY (INHALATION) at 15:43

## 2024-08-25 RX ADMIN — PIPERACILLIN AND TAZOBACTAM 4.5 G: 4; .5 INJECTION, POWDER, FOR SOLUTION INTRAVENOUS at 12:11

## 2024-08-25 RX ADMIN — PIPERACILLIN AND TAZOBACTAM 4.5 G: 4; .5 INJECTION, POWDER, FOR SOLUTION INTRAVENOUS at 06:27

## 2024-08-25 RX ADMIN — SODIUM CHLORIDE TAB 1 GM 1 G: 1 TAB at 10:13

## 2024-08-25 RX ADMIN — CARBAMAZEPINE 150 MG: 100 SUSPENSION ORAL at 06:27

## 2024-08-25 RX ADMIN — IPRATROPIUM BROMIDE AND ALBUTEROL SULFATE 3 ML: .5; 3 SOLUTION RESPIRATORY (INHALATION) at 11:20

## 2024-08-25 RX ADMIN — METOCLOPRAMIDE HYDROCHLORIDE 10 MG: 5 SOLUTION ORAL at 12:11

## 2024-08-25 RX ADMIN — ACETYLCYSTEINE 2 ML: 200 SOLUTION ORAL; RESPIRATORY (INHALATION) at 11:20

## 2024-08-25 RX ADMIN — LEVOTHYROXINE SODIUM 100 MCG: 100 TABLET ORAL at 10:13

## 2024-08-25 RX ADMIN — BRIVARACETAM 100 MG: 10 SOLUTION ORAL at 21:18

## 2024-08-25 RX ADMIN — ACETYLCYSTEINE 2 ML: 200 SOLUTION ORAL; RESPIRATORY (INHALATION) at 08:39

## 2024-08-25 RX ADMIN — SODIUM CHLORIDE TAB 1 GM 1 G: 1 TAB at 21:19

## 2024-08-25 RX ADMIN — PIPERACILLIN AND TAZOBACTAM 4.5 G: 4; .5 INJECTION, POWDER, FOR SOLUTION INTRAVENOUS at 00:36

## 2024-08-25 RX ADMIN — Medication 15 ML: at 12:11

## 2024-08-25 RX ADMIN — CARBAMAZEPINE 150 MG: 100 SUSPENSION ORAL at 12:12

## 2024-08-25 RX ADMIN — ACETYLCYSTEINE 2 ML: 200 SOLUTION ORAL; RESPIRATORY (INHALATION) at 19:48

## 2024-08-25 ASSESSMENT — ACTIVITIES OF DAILY LIVING (ADL)
ADLS_ACUITY_SCORE: 75
ADLS_ACUITY_SCORE: 67
ADLS_ACUITY_SCORE: 75
ADLS_ACUITY_SCORE: 75
ADLS_ACUITY_SCORE: 67
ADLS_ACUITY_SCORE: 75
ADLS_ACUITY_SCORE: 67
ADLS_ACUITY_SCORE: 75
ADLS_ACUITY_SCORE: 79
ADLS_ACUITY_SCORE: 67
ADLS_ACUITY_SCORE: 75
ADLS_ACUITY_SCORE: 79
ADLS_ACUITY_SCORE: 75
ADLS_ACUITY_SCORE: 67
ADLS_ACUITY_SCORE: 79
ADLS_ACUITY_SCORE: 79
ADLS_ACUITY_SCORE: 67
ADLS_ACUITY_SCORE: 67
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
## 2024-08-25 NOTE — PROGRESS NOTES
Shift Summary [id number]    Admitting Diagnosis: Aspiration pneumonia, unspecified aspiration pneumonia type, unspecified laterality, unspecified part of lung (H) [J69.0]     Vitals Stable on RA  Pain 0 /10.   FLACC pain score   A&Ox HECTOR non verbal  Voiding Incont B &B  Mobility A2 w/lift  Tele N/A  GI WNL  Orders Placed This Encounter      NPO for Medical/Clinical Reasons Except for: No Exceptions       Plan: pending safe deposition plan

## 2024-08-25 NOTE — PLAN OF CARE
Goal Outcome Evaluation:  Summary: Recurrent aspiration pneumonia  DATE & TIME: 08/25/24 [id number]   Cognitive Concerns/ Orientation : HECTOR - pt nonverbal due to TBI. Sometimes follows simple commands, and answers yes/no questions.   BEHAVIOR & AGGRESSION TOOL COLOR: Green  CIWA SCORE: N/A  ABNL VS/O2: VSS on RA ex tachy at times  MOBILITY: Wheelchair bound at baseline. A2 Lift. T&R.  PAIN MANAGMENT: Pt does not appear in pain  DIET: Chronic dysphagia. On tube feedings 60 ml/hr w/ 120mL q 4hr water flushes(goal). Strict NPO. Hold one hour before and after synthroid.   BOWEL/BLADDER: Incontinent. External catheter in place.  ABNL LAB/BG:   DRAIN/DEVICES: GJ tube running TF at 60mL/hr (goal rate). L PIV SL, int abx.  TELEMETRY RHYTHM: N/A  SKIN: Reddened coccyx, barrier cream applied. Scattered bruises, scabs, abrasions. Blanchable redness to heels and elbows - mepilex in place  TESTS/PROCEDURES: N/A  D/C DATE: Pending improvement  OTHER IMPORTANT INFO: Lives with his mother, who's his caretaker. Contact for MRSA and VRE. Patient needs frequent oral cares. Patient unable to expel excessive amount of secretions - notified MD, order for deep suctioning placed. RT to reasses q 72hrs.

## 2024-08-25 NOTE — PROGRESS NOTES
DATE & TIME: 8/24 Night    Cognitive Concerns/ Orientation : HECTOR - pt nonverbal due to TBI. Sometimes follows simple commands, and answers yes/no questions.   BEHAVIOR & AGGRESSION TOOL COLOR: Green  CIWA SCORE: N/A  ABNL VS/O2: VSS on room air.   MOBILITY: Wheelchair bound at baseline. Lift.   PAIN MANAGMENT: Minimal per FLAAC pain assessments  DIET: Chronic dysphagia. On tube feedings 60 ml/hr (goal). NPO.   BOWEL/BLADDER: Inconent of B&B. Loose stool x1 overnight.   ABNL LAB/BG:   DRAIN/DEVICES: GJ tube  TELEMETRY RHYTHM: N/A  SKIN: Reddened coccyx, barrier cream applied. Scattered bruises.  TESTS/PROCEDURES: N/A  D/C DATE: TBD  OTHER IMPORTANT INFO: Lives with his mother, who's his caretaker. T/R. Contact for MRSAand VRE.

## 2024-08-25 NOTE — PROGRESS NOTES
[facility] RCAT Note    Date:8/25/2024  Admission Diagnosis:Aspiration Pneumonia  Pulmonary History:Non smoker  Home Nebulizer/ MDI Use:  Home Oxygen Use:  Acuity Level (from RT Assessment flow sheet):2    Aerosol Therapy Initiated:Mucomist/ Tristan QID      Pulmonary Hygiene Initiated: CPT QID and deep  suctioning PRN      Volume Expansion Therapy Initiated: EzPAP      Current Oxygen Requirement:RA  Current SpO2:95    Re-evaluation date:08/28/2024    See 'RT Assessments' flow sheet for patient assessment scoring and Acuity Level Details.

## 2024-08-26 LAB
ANION GAP SERPL CALCULATED.3IONS-SCNC: 11 MMOL/L (ref 7–15)
BUN SERPL-MCNC: 11.1 MG/DL (ref 8–23)
CALCIUM SERPL-MCNC: 7.9 MG/DL (ref 8.8–10.4)
CHLORIDE SERPL-SCNC: 104 MMOL/L (ref 98–107)
CREAT SERPL-MCNC: 1.06 MG/DL (ref 0.67–1.17)
EGFRCR SERPLBLD CKD-EPI 2021: 79 ML/MIN/1.73M2
GLUCOSE SERPL-MCNC: 115 MG/DL (ref 70–99)
HCO3 SERPL-SCNC: 24 MMOL/L (ref 22–29)
MAGNESIUM SERPL-MCNC: 2.4 MG/DL (ref 1.7–2.3)
PHOSPHATE SERPL-MCNC: 2.1 MG/DL (ref 2.5–4.5)
POTASSIUM SERPL-SCNC: 3.7 MMOL/L (ref 3.4–5.3)
SODIUM SERPL-SCNC: 139 MMOL/L (ref 135–145)

## 2024-08-26 PROCEDURE — 80048 BASIC METABOLIC PNL TOTAL CA: CPT | Performed by: NURSE PRACTITIONER

## 2024-08-26 PROCEDURE — 120N000001 HC R&B MED SURG/OB

## 2024-08-26 PROCEDURE — 94640 AIRWAY INHALATION TREATMENT: CPT | Mod: 76

## 2024-08-26 PROCEDURE — 36415 COLL VENOUS BLD VENIPUNCTURE: CPT | Performed by: NURSE PRACTITIONER

## 2024-08-26 PROCEDURE — 83735 ASSAY OF MAGNESIUM: CPT | Performed by: NURSE PRACTITIONER

## 2024-08-26 PROCEDURE — 99232 SBSQ HOSP IP/OBS MODERATE 35: CPT | Performed by: HOSPITALIST

## 2024-08-26 PROCEDURE — 94640 AIRWAY INHALATION TREATMENT: CPT

## 2024-08-26 PROCEDURE — 250N000013 HC RX MED GY IP 250 OP 250 PS 637: Performed by: INTERNAL MEDICINE

## 2024-08-26 PROCEDURE — 999N000157 HC STATISTIC RCP TIME EA 10 MIN

## 2024-08-26 PROCEDURE — 250N000011 HC RX IP 250 OP 636: Performed by: EMERGENCY MEDICINE

## 2024-08-26 PROCEDURE — 84100 ASSAY OF PHOSPHORUS: CPT | Performed by: NURSE PRACTITIONER

## 2024-08-26 PROCEDURE — 250N000013 HC RX MED GY IP 250 OP 250 PS 637: Performed by: HOSPITALIST

## 2024-08-26 PROCEDURE — 250N000009 HC RX 250: Performed by: INTERNAL MEDICINE

## 2024-08-26 RX ADMIN — PIPERACILLIN AND TAZOBACTAM 4.5 G: 4; .5 INJECTION, POWDER, FOR SOLUTION INTRAVENOUS at 06:10

## 2024-08-26 RX ADMIN — BRIVARACETAM 100 MG: 10 SOLUTION ORAL at 09:01

## 2024-08-26 RX ADMIN — CARBAMAZEPINE 150 MG: 100 SUSPENSION ORAL at 00:59

## 2024-08-26 RX ADMIN — BRIVARACETAM 100 MG: 10 SOLUTION ORAL at 21:53

## 2024-08-26 RX ADMIN — IPRATROPIUM BROMIDE AND ALBUTEROL SULFATE 3 ML: .5; 3 SOLUTION RESPIRATORY (INHALATION) at 08:38

## 2024-08-26 RX ADMIN — PIPERACILLIN AND TAZOBACTAM 4.5 G: 4; .5 INJECTION, POWDER, FOR SOLUTION INTRAVENOUS at 11:25

## 2024-08-26 RX ADMIN — IPRATROPIUM BROMIDE AND ALBUTEROL SULFATE 3 ML: .5; 3 SOLUTION RESPIRATORY (INHALATION) at 20:07

## 2024-08-26 RX ADMIN — CARBAMAZEPINE 150 MG: 100 SUSPENSION ORAL at 11:25

## 2024-08-26 RX ADMIN — ACETYLCYSTEINE 2 ML: 200 SOLUTION ORAL; RESPIRATORY (INHALATION) at 08:38

## 2024-08-26 RX ADMIN — CARBAMAZEPINE 100 MG: 100 SUSPENSION ORAL at 17:10

## 2024-08-26 RX ADMIN — CYANOCOBALAMIN TAB 1000 MCG 1000 MCG: 1000 TAB at 09:00

## 2024-08-26 RX ADMIN — IPRATROPIUM BROMIDE AND ALBUTEROL SULFATE 3 ML: .5; 3 SOLUTION RESPIRATORY (INHALATION) at 15:07

## 2024-08-26 RX ADMIN — SODIUM CHLORIDE TAB 1 GM 1 G: 1 TAB at 21:53

## 2024-08-26 RX ADMIN — PIPERACILLIN AND TAZOBACTAM 4.5 G: 4; .5 INJECTION, POWDER, FOR SOLUTION INTRAVENOUS at 00:59

## 2024-08-26 RX ADMIN — Medication 40 MG: at 09:01

## 2024-08-26 RX ADMIN — POTASSIUM & SODIUM PHOSPHATES POWDER PACK 280-160-250 MG 1 PACKET: 280-160-250 PACK at 22:37

## 2024-08-26 RX ADMIN — Medication 40 MG: at 22:38

## 2024-08-26 RX ADMIN — POTASSIUM & SODIUM PHOSPHATES POWDER PACK 280-160-250 MG 1 PACKET: 280-160-250 PACK at 09:01

## 2024-08-26 RX ADMIN — ACETYLCYSTEINE 2 ML: 200 SOLUTION ORAL; RESPIRATORY (INHALATION) at 10:59

## 2024-08-26 RX ADMIN — LEVOTHYROXINE SODIUM 100 MCG: 100 TABLET ORAL at 09:00

## 2024-08-26 RX ADMIN — METOCLOPRAMIDE HYDROCHLORIDE 10 MG: 5 SOLUTION ORAL at 06:30

## 2024-08-26 RX ADMIN — ACETYLCYSTEINE 2 ML: 200 SOLUTION ORAL; RESPIRATORY (INHALATION) at 15:08

## 2024-08-26 RX ADMIN — Medication 15 ML: at 09:01

## 2024-08-26 RX ADMIN — METOCLOPRAMIDE HYDROCHLORIDE 10 MG: 5 SOLUTION ORAL at 11:24

## 2024-08-26 RX ADMIN — SODIUM CHLORIDE TAB 1 GM 1 G: 1 TAB at 09:01

## 2024-08-26 RX ADMIN — PIPERACILLIN AND TAZOBACTAM 4.5 G: 4; .5 INJECTION, POWDER, FOR SOLUTION INTRAVENOUS at 18:09

## 2024-08-26 RX ADMIN — CARBAMAZEPINE 150 MG: 100 SUSPENSION ORAL at 06:10

## 2024-08-26 RX ADMIN — IPRATROPIUM BROMIDE AND ALBUTEROL SULFATE 3 ML: .5; 3 SOLUTION RESPIRATORY (INHALATION) at 10:59

## 2024-08-26 RX ADMIN — METOCLOPRAMIDE HYDROCHLORIDE 10 MG: 5 SOLUTION ORAL at 17:10

## 2024-08-26 RX ADMIN — POTASSIUM & SODIUM PHOSPHATES POWDER PACK 280-160-250 MG 1 PACKET: 280-160-250 PACK at 18:22

## 2024-08-26 ASSESSMENT — ACTIVITIES OF DAILY LIVING (ADL)
ADLS_ACUITY_SCORE: 71
ADLS_ACUITY_SCORE: 75
ADLS_ACUITY_SCORE: 75
ADLS_ACUITY_SCORE: 73
ADLS_ACUITY_SCORE: 75
ADLS_ACUITY_SCORE: 71
ADLS_ACUITY_SCORE: 75
ADLS_ACUITY_SCORE: 73
ADLS_ACUITY_SCORE: 75
ADLS_ACUITY_SCORE: 75
ADLS_ACUITY_SCORE: 73
ADLS_ACUITY_SCORE: 73
ADLS_ACUITY_SCORE: 75
ADLS_ACUITY_SCORE: 71
ADLS_ACUITY_SCORE: 73
ADLS_ACUITY_SCORE: 73
ADLS_ACUITY_SCORE: 75
ADLS_ACUITY_SCORE: 73

## 2024-08-26 NOTE — PROGRESS NOTES
Current, pt is on room air, BBS were course diminished, Saturating 97%. CPT were done due to per family, 'pt is sleeping and vomited'. Will continue to follow the pt.    RT Alecia.

## 2024-08-26 NOTE — PLAN OF CARE
Summary: Recurrent aspiration pneumonia  DATE & TIME: 8/25/24 1900-2300   Cognitive Concerns/ Orientation : HECTOR - pt nonverbal, hx TBI. Sometimes follows simple commands, and shakes/nods head, gives thumbs up.  BEHAVIOR & AGGRESSION TOOL COLOR: Green  CIWA SCORE: N/A  ABNL VS/O2: Soft BP (94/50) and tachy at times (low 100s) on RA.   MOBILITY: Ax2, lift, WC bound at baseline; turn and repo.  PAIN MANAGMENT: No non verbal indicators of pain noted.  DIET: Chronic dysphagia. Tube feedings at goal rate of 60 ml/hr w/ 120mL water flushes q 4hr . Strict NPO. Hold  TF 1 hour before and after synthroid.   BOWEL/BLADDER: Incontinent. External catheter in place. No BM this shift.   ABNL LAB/BG: None new  DRAIN/DEVICES: GJ tube, external cath;  L PIV saline locked.  TELEMETRY RHYTHM: N/A  SKIN: Reddened coccyx, barrier cream applied. Scattered bruises, scabs, abrasions. Blanchable redness to heels and elbows - mepilex in place  TESTS/PROCEDURES: N/A  D/C DATE: Pending improvement  OTHER IMPORTANT INFO: Contact for MRSA and VRE. Patient needs frequent oral cares. Oral suctioning when needed.  Scheduled nebs.  Order for deep suctioning placed. RT to reasses q 72hrs

## 2024-08-26 NOTE — PLAN OF CARE
Summary: Recurrent aspiration pneumonia  DATE & TIME: 8/25/24-8/26/24 8530 -9606  Cognitive Concerns/ Orientation : HECTOR - pt nonverbal, hx TBI. Sometimes follows simple commands, and shakes/nods head, gives thumbs up.  BEHAVIOR & AGGRESSION TOOL COLOR: Green  CIWA SCORE: N/A  ABNL VS/O2: Soft BP (90/46-98/53)  on RA.   MOBILITY: Ax2, lift, WC bound at baseline; turn and repo.  PAIN MANAGMENT: No non verbal indicators of pain noted.  DIET: Chronic dysphagia. Tube feedings at goal rate of 60 ml/hr w/ 120mL water flushes q 4hr . Strict NPO. Hold  TF 1 hour before and after synthroid.   BOWEL/BLADDER: Incontinent. External catheter in place. No BM this shift.   ABNL LAB/BG: None new  DRAIN/DEVICES: GJ tube, external cath;  L PIV saline locked.  TELEMETRY RHYTHM: N/A  SKIN: Reddened coccyx, Scattered bruises, scabs, abrasions. Blanchable redness to heels and elbows - mepilex in place  TESTS/PROCEDURES: N/A  D/C DATE: Pending improvement  OTHER IMPORTANT INFO: Contact for MRSA and VRE. Patient needs frequent oral cares. Oral suctioning when needed.  Scheduled nebs.  Order for deep suctioning placed. RT to reasses q 72hrs
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
## 2024-08-26 NOTE — PLAN OF CARE
Goal Outcome Evaluation:  Cognitive Concerns/ Orientation : HECTOR - pt nonverbal, hx TBI. Can follow simple commands & shakes/nods head, gives thumbs up.  BEHAVIOR & AGGRESSION TOOL COLOR: Green  CIWA SCORE: N/A  ABNL VS/O2: VSS on RA.   MOBILITY: Ax2, lift, WC bound at baseline; turn and repo.  PAIN MANAGMENT: no  pain  DIET: NPO. Tube feedings at goal rate of 60 ml/hr w/ 120mL water flushes q 4hr .   BOWEL/BLADDER: Incontinent. External catheter in place.   ABNL LAB/BG: phos 2.1 replacement in process  DRAIN/DEVICES: GJ tube, external cath;  L PIV saline locked.  TELEMETRY RHYTHM: N/A  SKIN: Reddened coccyx, Scattered bruises, scabs, abrasions. Blanchable redness to heels and elbows - mepilex in place  TESTS/PROCEDURES: N/A  D/C DATE: possible discharge in 1-2 days  OTHER IMPORTANT INFO: Contact for MRSA and VRE. Oral suctioning provided once, thick creamy sputum. Diminished lung sounds. Congested cough. No SOB. Oral cares provided. Updated mother at the bedside

## 2024-08-27 LAB
ANION GAP SERPL CALCULATED.3IONS-SCNC: 11 MMOL/L (ref 7–15)
BUN SERPL-MCNC: 10.2 MG/DL (ref 8–23)
CALCIUM SERPL-MCNC: 8 MG/DL (ref 8.8–10.4)
CHLORIDE SERPL-SCNC: 100 MMOL/L (ref 98–107)
CREAT SERPL-MCNC: 1.01 MG/DL (ref 0.67–1.17)
EGFRCR SERPLBLD CKD-EPI 2021: 84 ML/MIN/1.73M2
ERYTHROCYTE [DISTWIDTH] IN BLOOD BY AUTOMATED COUNT: 17.2 % (ref 10–15)
GLUCOSE SERPL-MCNC: 131 MG/DL (ref 70–99)
HCO3 SERPL-SCNC: 24 MMOL/L (ref 22–29)
HCT VFR BLD AUTO: 36.7 % (ref 40–53)
HGB BLD-MCNC: 11.6 G/DL (ref 13.3–17.7)
MCH RBC QN AUTO: 28 PG (ref 26.5–33)
MCHC RBC AUTO-ENTMCNC: 31.6 G/DL (ref 31.5–36.5)
MCV RBC AUTO: 88 FL (ref 78–100)
PHOSPHATE SERPL-MCNC: 2.6 MG/DL (ref 2.5–4.5)
PLATELET # BLD AUTO: 263 10E3/UL (ref 150–450)
POTASSIUM SERPL-SCNC: 4.1 MMOL/L (ref 3.4–5.3)
RBC # BLD AUTO: 4.15 10E6/UL (ref 4.4–5.9)
SODIUM SERPL-SCNC: 135 MMOL/L (ref 135–145)
WBC # BLD AUTO: 6.4 10E3/UL (ref 4–11)

## 2024-08-27 PROCEDURE — 99232 SBSQ HOSP IP/OBS MODERATE 35: CPT | Performed by: HOSPITALIST

## 2024-08-27 PROCEDURE — 120N000001 HC R&B MED SURG/OB

## 2024-08-27 PROCEDURE — 85027 COMPLETE CBC AUTOMATED: CPT | Performed by: HOSPITALIST

## 2024-08-27 PROCEDURE — 999N000157 HC STATISTIC RCP TIME EA 10 MIN

## 2024-08-27 PROCEDURE — 250N000013 HC RX MED GY IP 250 OP 250 PS 637: Performed by: INTERNAL MEDICINE

## 2024-08-27 PROCEDURE — 250N000011 HC RX IP 250 OP 636: Performed by: EMERGENCY MEDICINE

## 2024-08-27 PROCEDURE — 250N000009 HC RX 250: Performed by: INTERNAL MEDICINE

## 2024-08-27 PROCEDURE — 94640 AIRWAY INHALATION TREATMENT: CPT | Mod: 76

## 2024-08-27 PROCEDURE — 84100 ASSAY OF PHOSPHORUS: CPT | Performed by: HOSPITALIST

## 2024-08-27 PROCEDURE — 36415 COLL VENOUS BLD VENIPUNCTURE: CPT | Performed by: HOSPITALIST

## 2024-08-27 PROCEDURE — 94640 AIRWAY INHALATION TREATMENT: CPT

## 2024-08-27 PROCEDURE — 250N000013 HC RX MED GY IP 250 OP 250 PS 637: Performed by: HOSPITALIST

## 2024-08-27 PROCEDURE — 82374 ASSAY BLOOD CARBON DIOXIDE: CPT | Performed by: HOSPITALIST

## 2024-08-27 RX ADMIN — Medication 40 MG: at 22:21

## 2024-08-27 RX ADMIN — Medication 40 MG: at 10:00

## 2024-08-27 RX ADMIN — METOCLOPRAMIDE HYDROCHLORIDE 10 MG: 5 SOLUTION ORAL at 00:32

## 2024-08-27 RX ADMIN — ACETYLCYSTEINE 2 ML: 200 SOLUTION ORAL; RESPIRATORY (INHALATION) at 15:19

## 2024-08-27 RX ADMIN — POTASSIUM & SODIUM PHOSPHATES POWDER PACK 280-160-250 MG 1 PACKET: 280-160-250 PACK at 02:34

## 2024-08-27 RX ADMIN — PIPERACILLIN AND TAZOBACTAM 4.5 G: 4; .5 INJECTION, POWDER, FOR SOLUTION INTRAVENOUS at 19:09

## 2024-08-27 RX ADMIN — ACETYLCYSTEINE 2 ML: 200 SOLUTION ORAL; RESPIRATORY (INHALATION) at 20:22

## 2024-08-27 RX ADMIN — BRIVARACETAM 100 MG: 10 SOLUTION ORAL at 08:54

## 2024-08-27 RX ADMIN — BRIVARACETAM 100 MG: 10 SOLUTION ORAL at 22:21

## 2024-08-27 RX ADMIN — CARBAMAZEPINE 150 MG: 100 SUSPENSION ORAL at 11:27

## 2024-08-27 RX ADMIN — ACETYLCYSTEINE 2 ML: 200 SOLUTION ORAL; RESPIRATORY (INHALATION) at 07:52

## 2024-08-27 RX ADMIN — POTASSIUM & SODIUM PHOSPHATES POWDER PACK 280-160-250 MG 1 PACKET: 280-160-250 PACK at 08:05

## 2024-08-27 RX ADMIN — CYANOCOBALAMIN TAB 1000 MCG 1000 MCG: 1000 TAB at 08:05

## 2024-08-27 RX ADMIN — CARBAMAZEPINE 150 MG: 100 SUSPENSION ORAL at 00:33

## 2024-08-27 RX ADMIN — HYDROCORTISONE 7.5 MG: 5 TABLET ORAL at 13:47

## 2024-08-27 RX ADMIN — PIPERACILLIN AND TAZOBACTAM 4.5 G: 4; .5 INJECTION, POWDER, FOR SOLUTION INTRAVENOUS at 00:32

## 2024-08-27 RX ADMIN — METOCLOPRAMIDE HYDROCHLORIDE 10 MG: 5 SOLUTION ORAL at 22:21

## 2024-08-27 RX ADMIN — CARBAMAZEPINE 150 MG: 100 SUSPENSION ORAL at 07:00

## 2024-08-27 RX ADMIN — HYDROCORTISONE 15 MG: 10 TABLET ORAL at 09:46

## 2024-08-27 RX ADMIN — PIPERACILLIN AND TAZOBACTAM 4.5 G: 4; .5 INJECTION, POWDER, FOR SOLUTION INTRAVENOUS at 11:27

## 2024-08-27 RX ADMIN — METOCLOPRAMIDE HYDROCHLORIDE 10 MG: 5 SOLUTION ORAL at 07:00

## 2024-08-27 RX ADMIN — METOCLOPRAMIDE HYDROCHLORIDE 10 MG: 5 SOLUTION ORAL at 16:16

## 2024-08-27 RX ADMIN — Medication 15 ML: at 08:05

## 2024-08-27 RX ADMIN — CARBAMAZEPINE 100 MG: 100 SUSPENSION ORAL at 19:09

## 2024-08-27 RX ADMIN — PIPERACILLIN AND TAZOBACTAM 4.5 G: 4; .5 INJECTION, POWDER, FOR SOLUTION INTRAVENOUS at 06:54

## 2024-08-27 RX ADMIN — METOCLOPRAMIDE HYDROCHLORIDE 10 MG: 5 SOLUTION ORAL at 11:27

## 2024-08-27 RX ADMIN — SODIUM CHLORIDE TAB 1 GM 1 G: 1 TAB at 08:05

## 2024-08-27 RX ADMIN — LEVOTHYROXINE SODIUM 100 MCG: 100 TABLET ORAL at 08:05

## 2024-08-27 RX ADMIN — SODIUM CHLORIDE TAB 1 GM 1 G: 1 TAB at 22:21

## 2024-08-27 RX ADMIN — ACETYLCYSTEINE 2 ML: 200 SOLUTION ORAL; RESPIRATORY (INHALATION) at 10:46

## 2024-08-27 ASSESSMENT — ACTIVITIES OF DAILY LIVING (ADL)
ADLS_ACUITY_SCORE: 77
ADLS_ACUITY_SCORE: 79
ADLS_ACUITY_SCORE: 77
ADLS_ACUITY_SCORE: 73
ADLS_ACUITY_SCORE: 77
ADLS_ACUITY_SCORE: 77
ADLS_ACUITY_SCORE: 73
ADLS_ACUITY_SCORE: 77
ADLS_ACUITY_SCORE: 77
ADLS_ACUITY_SCORE: 73
ADLS_ACUITY_SCORE: 77
ADLS_ACUITY_SCORE: 77
ADLS_ACUITY_SCORE: 73
ADLS_ACUITY_SCORE: 77
ADLS_ACUITY_SCORE: 77

## 2024-08-27 NOTE — PROGRESS NOTES
Orientation: A/O x 2    Vitals/Tele: VSS on RA    IV Access/drains: R PIV- SL    Diet: NPO, Tube feeding @ 60ml/hr w/ 120ml water flush q4hr    Mobility: Ax2, lift, T/R    GI/: Incontinent of B/B, external cath in place.    Wound/Skin: Coccyx redness, blanchable redness to heels and elbows- mepilex in place, scattered bruising.    Consults:     Discharge Plan: TBD      See Flow sheets for assessment

## 2024-08-27 NOTE — PLAN OF CARE
Goal Outcome Evaluation:  Cognitive Concerns/ Orientation : HECTOR - pt nonverbal, hx TBI. Sleeping between cares  BEHAVIOR & AGGRESSION TOOL COLOR: Green  CIWA SCORE: N/A  ABNL VS/O2: VSS on RA.   MOBILITY: Ax2, lift, WC bound at baseline; turn and repo.  PAIN MANAGMENT: no pain  DIET: NPO. Tube feedings at goal rate of 60 ml/hr w/ 120mL water flushes q 4hr .   BOWEL/BLADDER: Incontinent. External catheter in place. 1 loose brown BM  ABNL LAB/BG: NA  DRAIN/DEVICES: GJ tube, external cath;  L PIV saline locked.  TELEMETRY RHYTHM: N/A  SKIN: Reddened coccyx, Scattered bruises, scabs, abrasions. Blanchable redness to heels and elbows - mepilex in place  TESTS/PROCEDURES: N/A  D/C DATE: possible discharge in 1-2 days  OTHER IMPORTANT INFO: Contact for MRSA and VRE. Oral suctioning provided x2, thick creamy sputum. Diminished lung sounds. No SOB. Oral cares provided, mouth very dry. Restarted steroids
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
## 2024-08-28 ENCOUNTER — APPOINTMENT (OUTPATIENT)
Dept: CT IMAGING | Facility: CLINIC | Age: 64
DRG: 177 | End: 2024-08-28
Attending: HOSPITALIST
Payer: MEDICARE

## 2024-08-28 LAB
ALBUMIN SERPL BCG-MCNC: 3.3 G/DL (ref 3.5–5.2)
ALP SERPL-CCNC: 115 U/L (ref 40–150)
ALT SERPL W P-5'-P-CCNC: 40 U/L (ref 0–70)
ANION GAP SERPL CALCULATED.3IONS-SCNC: 11 MMOL/L (ref 7–15)
AST SERPL W P-5'-P-CCNC: 51 U/L (ref 0–45)
BACTERIA BLD CULT: NO GROWTH
BASE EXCESS BLDV CALC-SCNC: 1.7 MMOL/L (ref -3–3)
BILIRUB SERPL-MCNC: 0.3 MG/DL
BUN SERPL-MCNC: 10.4 MG/DL (ref 8–23)
CALCIUM SERPL-MCNC: 8.3 MG/DL (ref 8.8–10.4)
CHLORIDE SERPL-SCNC: 99 MMOL/L (ref 98–107)
CREAT SERPL-MCNC: 0.87 MG/DL (ref 0.67–1.17)
EGFRCR SERPLBLD CKD-EPI 2021: >90 ML/MIN/1.73M2
ERYTHROCYTE [DISTWIDTH] IN BLOOD BY AUTOMATED COUNT: 16.8 % (ref 10–15)
GLUCOSE SERPL-MCNC: 139 MG/DL (ref 70–99)
HCO3 BLDV-SCNC: 28 MMOL/L (ref 21–28)
HCO3 SERPL-SCNC: 24 MMOL/L (ref 22–29)
HCT VFR BLD AUTO: 38.6 % (ref 40–53)
HGB BLD-MCNC: 12.3 G/DL (ref 13.3–17.7)
MCH RBC QN AUTO: 28.3 PG (ref 26.5–33)
MCHC RBC AUTO-ENTMCNC: 31.9 G/DL (ref 31.5–36.5)
MCV RBC AUTO: 89 FL (ref 78–100)
O2/TOTAL GAS SETTING VFR VENT: 0 %
OXYHGB MFR BLDV: 66 % (ref 70–75)
PCO2 BLDV: 50 MM HG (ref 40–50)
PH BLDV: 7.36 [PH] (ref 7.32–7.43)
PHOSPHATE SERPL-MCNC: 2.2 MG/DL (ref 2.5–4.5)
PLATELET # BLD AUTO: 280 10E3/UL (ref 150–450)
PO2 BLDV: 37 MM HG (ref 25–47)
POTASSIUM SERPL-SCNC: 4 MMOL/L (ref 3.4–5.3)
PROT SERPL-MCNC: 6.9 G/DL (ref 6.4–8.3)
RBC # BLD AUTO: 4.34 10E6/UL (ref 4.4–5.9)
SAO2 % BLDV: 67.1 % (ref 70–75)
SODIUM SERPL-SCNC: 134 MMOL/L (ref 135–145)
WBC # BLD AUTO: 6.7 10E3/UL (ref 4–11)

## 2024-08-28 PROCEDURE — 85027 COMPLETE CBC AUTOMATED: CPT | Performed by: HOSPITALIST

## 2024-08-28 PROCEDURE — 999N000040 HC STATISTIC CONSULT NO CHARGE VASC ACCESS

## 2024-08-28 PROCEDURE — 250N000013 HC RX MED GY IP 250 OP 250 PS 637: Performed by: INTERNAL MEDICINE

## 2024-08-28 PROCEDURE — 250N000013 HC RX MED GY IP 250 OP 250 PS 637: Performed by: HOSPITALIST

## 2024-08-28 PROCEDURE — 250N000009 HC RX 250

## 2024-08-28 PROCEDURE — 999N000127 HC STATISTIC PERIPHERAL IV START W US GUIDANCE

## 2024-08-28 PROCEDURE — 250N000009 HC RX 250: Performed by: INTERNAL MEDICINE

## 2024-08-28 PROCEDURE — 36415 COLL VENOUS BLD VENIPUNCTURE: CPT | Performed by: HOSPITALIST

## 2024-08-28 PROCEDURE — 94640 AIRWAY INHALATION TREATMENT: CPT | Mod: 76

## 2024-08-28 PROCEDURE — 250N000011 HC RX IP 250 OP 636: Performed by: EMERGENCY MEDICINE

## 2024-08-28 PROCEDURE — 94640 AIRWAY INHALATION TREATMENT: CPT

## 2024-08-28 PROCEDURE — 70450 CT HEAD/BRAIN W/O DYE: CPT | Mod: MG

## 2024-08-28 PROCEDURE — 80053 COMPREHEN METABOLIC PANEL: CPT | Performed by: HOSPITALIST

## 2024-08-28 PROCEDURE — 999N000157 HC STATISTIC RCP TIME EA 10 MIN

## 2024-08-28 PROCEDURE — 84100 ASSAY OF PHOSPHORUS: CPT | Performed by: HOSPITALIST

## 2024-08-28 PROCEDURE — 82805 BLOOD GASES W/O2 SATURATION: CPT | Performed by: HOSPITALIST

## 2024-08-28 PROCEDURE — 120N000001 HC R&B MED SURG/OB

## 2024-08-28 PROCEDURE — 99232 SBSQ HOSP IP/OBS MODERATE 35: CPT | Performed by: HOSPITALIST

## 2024-08-28 RX ORDER — ALBUTEROL SULFATE 0.83 MG/ML
2.5 SOLUTION RESPIRATORY (INHALATION) EVERY 6 HOURS PRN
Status: DISCONTINUED | OUTPATIENT
Start: 2024-08-28 | End: 2024-08-31 | Stop reason: HOSPADM

## 2024-08-28 RX ORDER — ALBUTEROL SULFATE 0.83 MG/ML
SOLUTION RESPIRATORY (INHALATION)
Status: COMPLETED
Start: 2024-08-28 | End: 2024-08-28

## 2024-08-28 RX ADMIN — Medication 40 MG: at 21:05

## 2024-08-28 RX ADMIN — ALBUTEROL SULFATE 2.5 MG: 2.5 SOLUTION RESPIRATORY (INHALATION) at 19:16

## 2024-08-28 RX ADMIN — PIPERACILLIN AND TAZOBACTAM 4.5 G: 4; .5 INJECTION, POWDER, FOR SOLUTION INTRAVENOUS at 18:02

## 2024-08-28 RX ADMIN — METOCLOPRAMIDE HYDROCHLORIDE 10 MG: 5 SOLUTION ORAL at 06:24

## 2024-08-28 RX ADMIN — CYANOCOBALAMIN TAB 1000 MCG 1000 MCG: 1000 TAB at 08:47

## 2024-08-28 RX ADMIN — CARBAMAZEPINE 150 MG: 100 SUSPENSION ORAL at 00:32

## 2024-08-28 RX ADMIN — Medication 15 ML: at 08:50

## 2024-08-28 RX ADMIN — ACETYLCYSTEINE 2 ML: 200 SOLUTION ORAL; RESPIRATORY (INHALATION) at 14:09

## 2024-08-28 RX ADMIN — HYDROCORTISONE 7.5 MG: 5 TABLET ORAL at 15:42

## 2024-08-28 RX ADMIN — LEVOTHYROXINE SODIUM 100 MCG: 100 TABLET ORAL at 08:47

## 2024-08-28 RX ADMIN — BRIVARACETAM 100 MG: 10 SOLUTION ORAL at 08:47

## 2024-08-28 RX ADMIN — BRIVARACETAM 100 MG: 10 SOLUTION ORAL at 21:04

## 2024-08-28 RX ADMIN — PIPERACILLIN AND TAZOBACTAM 4.5 G: 4; .5 INJECTION, POWDER, FOR SOLUTION INTRAVENOUS at 00:32

## 2024-08-28 RX ADMIN — SODIUM CHLORIDE TAB 1 GM 1 G: 1 TAB at 08:50

## 2024-08-28 RX ADMIN — HYDROCORTISONE 15 MG: 10 TABLET ORAL at 08:49

## 2024-08-28 RX ADMIN — PIPERACILLIN AND TAZOBACTAM 4.5 G: 4; .5 INJECTION, POWDER, FOR SOLUTION INTRAVENOUS at 11:17

## 2024-08-28 RX ADMIN — Medication 40 MG: at 08:47

## 2024-08-28 RX ADMIN — METOCLOPRAMIDE HYDROCHLORIDE 10 MG: 5 SOLUTION ORAL at 21:05

## 2024-08-28 RX ADMIN — ACETYLCYSTEINE 2 ML: 200 SOLUTION ORAL; RESPIRATORY (INHALATION) at 10:23

## 2024-08-28 RX ADMIN — ALBUTEROL SULFATE 2.5 MG: 2.5 SOLUTION RESPIRATORY (INHALATION) at 10:23

## 2024-08-28 RX ADMIN — CARBAMAZEPINE 150 MG: 100 SUSPENSION ORAL at 15:42

## 2024-08-28 RX ADMIN — CARBAMAZEPINE 100 MG: 100 SUSPENSION ORAL at 18:03

## 2024-08-28 RX ADMIN — SODIUM CHLORIDE TAB 1 GM 1 G: 1 TAB at 21:05

## 2024-08-28 RX ADMIN — METOCLOPRAMIDE HYDROCHLORIDE 10 MG: 5 SOLUTION ORAL at 15:43

## 2024-08-28 RX ADMIN — POTASSIUM & SODIUM PHOSPHATES POWDER PACK 280-160-250 MG 1 PACKET: 280-160-250 PACK at 08:47

## 2024-08-28 RX ADMIN — METOCLOPRAMIDE HYDROCHLORIDE 10 MG: 5 SOLUTION ORAL at 11:19

## 2024-08-28 RX ADMIN — ALBUTEROL SULFATE 2.5 MG: 2.5 SOLUTION RESPIRATORY (INHALATION) at 07:30

## 2024-08-28 RX ADMIN — CARBAMAZEPINE 150 MG: 100 SUSPENSION ORAL at 06:17

## 2024-08-28 RX ADMIN — ALBUTEROL SULFATE 2.5 MG: 2.5 SOLUTION RESPIRATORY (INHALATION) at 14:09

## 2024-08-28 RX ADMIN — PIPERACILLIN AND TAZOBACTAM 4.5 G: 4; .5 INJECTION, POWDER, FOR SOLUTION INTRAVENOUS at 06:17

## 2024-08-28 RX ADMIN — ACETYLCYSTEINE 2 ML: 200 SOLUTION ORAL; RESPIRATORY (INHALATION) at 19:16

## 2024-08-28 RX ADMIN — ACETYLCYSTEINE 2 ML: 200 SOLUTION ORAL; RESPIRATORY (INHALATION) at 07:30

## 2024-08-28 ASSESSMENT — ACTIVITIES OF DAILY LIVING (ADL)
ADLS_ACUITY_SCORE: 79

## 2024-08-28 NOTE — PLAN OF CARE
Goal Outcome Evaluation:  Cognitive Concerns/ Orientation : HECTOR - pt nonverbal, hx TBI. More interactive this afternoon  BEHAVIOR & AGGRESSION TOOL COLOR: Green  CIWA SCORE: N/A  ABNL VS/O2: VSS on RA.   MOBILITY: Ax2, lift, WC bound at baseline; turn and repo.  PAIN MANAGMENT: no pain  DIET: NPO. Tube feedings at goal rate of 60 ml/hr w/ 120mL water flushes q 4hr.   BOWEL/BLADDER: Incontinent. External catheter in place. Schedule Miralax held.   ABNL LAB/BG:   DRAIN/DEVICES: GJ tube, external cath;  new IV placed  TELEMETRY RHYTHM: N/A  SKIN: Reddened buttocks and memo area, Scattered bruises, scabs, abrasions.   TESTS/PROCEDURES: N/A  D/C DATE: possible discharge tomorrow  OTHER IMPORTANT INFO: Contact for MRSA and VRE. Congested cough.   Diminished lung sounds. Suctioned x3. Oral cares provided multiple times.

## 2024-08-28 NOTE — PLAN OF CARE
Goal Outcome Evaluation:                      Summary: Recurrent aspiration pneumonia  DATE & TIME: 8/26/24, pm shift.  Cognitive Concerns/ Orientation : HECTOR - pt nonverbal, hx TBI. Sleeping between cares  BEHAVIOR & AGGRESSION TOOL COLOR: Green  CIWA SCORE: N/A  ABNL VS/O2: VSS on RA.   MOBILITY: Ax2, lift, WC bound at baseline; turn and repo.  PAIN MANAGMENT: no pain  DIET: NPO. Tube feedings at goal rate of 60 ml/hr w/ 120mL water flushes q 4hr. Gave all meds via G tube.  BOWEL/BLADDER: Incontinent. External catheter in place. Had small lose stool. Schedule Miralax held.   ABNL LAB/BG: Hgb 11.6, Calcium 8.0  DRAIN/DEVICES: GJ tube, external cath;  L PIV saline locked.  TELEMETRY RHYTHM: N/A  SKIN: Reddened coccyx, Scattered bruises, scabs, abrasions.   TESTS/PROCEDURES: N/A  D/C DATE: possible discharge in 1-2 days  OTHER IMPORTANT INFO: Contact for MRSA and VRE. Infrequent congestive cough. On schedule neb treatment. Deep suctioning by RT.  Diminished lung sounds. No SOB. Oral cares provided. Restarted steroids today.

## 2024-08-28 NOTE — PLAN OF CARE
Summary: Recurrent aspiration pneumonia  DATE & TIME: 8/28/24 6921-7926  Cognitive Concerns/ Orientation : HECTOR - pt nonverbal, hx TBI. Sleeping between cares  BEHAVIOR & AGGRESSION TOOL COLOR: Green  CIWA SCORE: N/A  ABNL VS/O2: VSS on RA.   MOBILITY: Ax2, lift, WC bound at baseline; turn and repo.  PAIN MANAGMENT: no pain  DIET: NPO. Tube feedings at goal rate of 60 ml/hr w/ 120mL water flushes q 4hr.   BOWEL/BLADDER: Incontinent. External catheter in place. Had small lose stool. Schedule Miralax held.   ABNL LAB/BG: Hgb 11.6, Calcium 8.0  DRAIN/DEVICES: GJ tube, external cath;  L PIV saline locked.  TELEMETRY RHYTHM: N/A  SKIN: Reddened coccyx, Scattered bruises, scabs, abrasions.   TESTS/PROCEDURES: N/A  D/C DATE: possible discharge in 1-2 days  OTHER IMPORTANT INFO: Contact for MRSA and VRE. Infrequent congested cough. On schedule nebs.  Diminished lung sounds. Suctioned x1. Oral cares provided.
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
## 2024-08-29 LAB — PHOSPHATE SERPL-MCNC: 2.5 MG/DL (ref 2.5–4.5)

## 2024-08-29 PROCEDURE — 250N000011 HC RX IP 250 OP 636: Performed by: INTERNAL MEDICINE

## 2024-08-29 PROCEDURE — 94640 AIRWAY INHALATION TREATMENT: CPT

## 2024-08-29 PROCEDURE — 250N000013 HC RX MED GY IP 250 OP 250 PS 637: Performed by: INTERNAL MEDICINE

## 2024-08-29 PROCEDURE — 84100 ASSAY OF PHOSPHORUS: CPT | Performed by: HOSPITALIST

## 2024-08-29 PROCEDURE — 94640 AIRWAY INHALATION TREATMENT: CPT | Mod: 76

## 2024-08-29 PROCEDURE — 120N000001 HC R&B MED SURG/OB

## 2024-08-29 PROCEDURE — 250N000013 HC RX MED GY IP 250 OP 250 PS 637: Performed by: HOSPITALIST

## 2024-08-29 PROCEDURE — 250N000009 HC RX 250: Performed by: INTERNAL MEDICINE

## 2024-08-29 PROCEDURE — 999N000157 HC STATISTIC RCP TIME EA 10 MIN

## 2024-08-29 PROCEDURE — 99233 SBSQ HOSP IP/OBS HIGH 50: CPT | Performed by: INTERNAL MEDICINE

## 2024-08-29 PROCEDURE — 250N000011 HC RX IP 250 OP 636: Performed by: EMERGENCY MEDICINE

## 2024-08-29 PROCEDURE — 36415 COLL VENOUS BLD VENIPUNCTURE: CPT | Performed by: HOSPITALIST

## 2024-08-29 RX ORDER — SULFAMETHOXAZOLE AND TRIMETHOPRIM 200; 40 MG/5ML; MG/5ML
20 SUSPENSION ORAL 2 TIMES DAILY
Status: DISCONTINUED | OUTPATIENT
Start: 2024-08-29 | End: 2024-08-31 | Stop reason: HOSPADM

## 2024-08-29 RX ORDER — SULFAMETHOXAZOLE/TRIMETHOPRIM 800-160 MG
1 TABLET ORAL 2 TIMES DAILY
Status: DISCONTINUED | OUTPATIENT
Start: 2024-08-29 | End: 2024-08-29

## 2024-08-29 RX ORDER — POLYETHYLENE GLYCOL 3350 17 G/17G
17 POWDER, FOR SOLUTION ORAL DAILY
Qty: 510 G | Refills: 0 | Status: SHIPPED | OUTPATIENT
Start: 2024-08-29

## 2024-08-29 RX ORDER — SULFAMETHOXAZOLE AND TRIMETHOPRIM 200; 40 MG/5ML; MG/5ML
20 SUSPENSION ORAL 2 TIMES DAILY
Qty: 240 ML | Refills: 0 | Status: SHIPPED | OUTPATIENT
Start: 2024-08-29 | End: 2024-08-31

## 2024-08-29 RX ORDER — PIPERACILLIN SODIUM, TAZOBACTAM SODIUM 4; .5 G/20ML; G/20ML
4.5 INJECTION, POWDER, LYOPHILIZED, FOR SOLUTION INTRAVENOUS EVERY 6 HOURS
Status: COMPLETED | OUTPATIENT
Start: 2024-08-29 | End: 2024-08-29

## 2024-08-29 RX ADMIN — ALBUTEROL SULFATE 2.5 MG: 2.5 SOLUTION RESPIRATORY (INHALATION) at 15:34

## 2024-08-29 RX ADMIN — LEVOTHYROXINE SODIUM 100 MCG: 100 TABLET ORAL at 10:30

## 2024-08-29 RX ADMIN — POLYETHYLENE GLYCOL 3350 17 G: 17 POWDER, FOR SOLUTION ORAL at 10:26

## 2024-08-29 RX ADMIN — SULFAMETHOXAZOLE AND TRIMETHOPRIM 160 MG: 200; 40 SUSPENSION ORAL at 22:05

## 2024-08-29 RX ADMIN — BRIVARACETAM 100 MG: 10 SOLUTION ORAL at 21:59

## 2024-08-29 RX ADMIN — POTASSIUM & SODIUM PHOSPHATES POWDER PACK 280-160-250 MG 1 PACKET: 280-160-250 PACK at 10:26

## 2024-08-29 RX ADMIN — METOCLOPRAMIDE HYDROCHLORIDE 10 MG: 5 SOLUTION ORAL at 22:03

## 2024-08-29 RX ADMIN — ALBUTEROL SULFATE 2.5 MG: 2.5 SOLUTION RESPIRATORY (INHALATION) at 20:19

## 2024-08-29 RX ADMIN — SULFAMETHOXAZOLE AND TRIMETHOPRIM 160 MG: 200; 40 SUSPENSION ORAL at 13:53

## 2024-08-29 RX ADMIN — ACETYLCYSTEINE 2 ML: 200 SOLUTION ORAL; RESPIRATORY (INHALATION) at 15:30

## 2024-08-29 RX ADMIN — Medication 40 MG: at 22:01

## 2024-08-29 RX ADMIN — CARBAMAZEPINE 150 MG: 100 SUSPENSION ORAL at 00:09

## 2024-08-29 RX ADMIN — ACETYLCYSTEINE 2 ML: 200 SOLUTION ORAL; RESPIRATORY (INHALATION) at 20:18

## 2024-08-29 RX ADMIN — Medication 40 MG: at 10:18

## 2024-08-29 RX ADMIN — ALBUTEROL SULFATE 2.5 MG: 2.5 SOLUTION RESPIRATORY (INHALATION) at 11:34

## 2024-08-29 RX ADMIN — ALBUTEROL SULFATE 2.5 MG: 2.5 SOLUTION RESPIRATORY (INHALATION) at 07:21

## 2024-08-29 RX ADMIN — POLYETHYLENE GLYCOL 3350 17 G: 17 POWDER, FOR SOLUTION ORAL at 21:57

## 2024-08-29 RX ADMIN — CARBAMAZEPINE 100 MG: 100 SUSPENSION ORAL at 18:17

## 2024-08-29 RX ADMIN — METOCLOPRAMIDE HYDROCHLORIDE 10 MG: 5 SOLUTION ORAL at 10:36

## 2024-08-29 RX ADMIN — PIPERACILLIN AND TAZOBACTAM 4.5 G: 4; .5 INJECTION, POWDER, FOR SOLUTION INTRAVENOUS at 11:27

## 2024-08-29 RX ADMIN — HYDROCORTISONE 15 MG: 10 TABLET ORAL at 10:30

## 2024-08-29 RX ADMIN — SODIUM CHLORIDE TAB 1 GM 1 G: 1 TAB at 21:56

## 2024-08-29 RX ADMIN — METOCLOPRAMIDE HYDROCHLORIDE 10 MG: 5 SOLUTION ORAL at 06:21

## 2024-08-29 RX ADMIN — SODIUM CHLORIDE TAB 1 GM 1 G: 1 TAB at 10:30

## 2024-08-29 RX ADMIN — Medication 15 ML: at 10:27

## 2024-08-29 RX ADMIN — PIPERACILLIN AND TAZOBACTAM 4.5 G: 4; .5 INJECTION, POWDER, FOR SOLUTION INTRAVENOUS at 00:09

## 2024-08-29 RX ADMIN — BRIVARACETAM 100 MG: 10 SOLUTION ORAL at 10:17

## 2024-08-29 RX ADMIN — CARBAMAZEPINE 150 MG: 100 SUSPENSION ORAL at 13:57

## 2024-08-29 RX ADMIN — HYDROCORTISONE 7.5 MG: 5 TABLET ORAL at 13:58

## 2024-08-29 RX ADMIN — CARBAMAZEPINE 150 MG: 100 SUSPENSION ORAL at 06:21

## 2024-08-29 RX ADMIN — PIPERACILLIN AND TAZOBACTAM 4.5 G: 4; .5 INJECTION, POWDER, FOR SOLUTION INTRAVENOUS at 17:14

## 2024-08-29 RX ADMIN — CYANOCOBALAMIN TAB 1000 MCG 1000 MCG: 1000 TAB at 10:30

## 2024-08-29 RX ADMIN — METOCLOPRAMIDE HYDROCHLORIDE 10 MG: 5 SOLUTION ORAL at 16:41

## 2024-08-29 RX ADMIN — ACETYLCYSTEINE 2 ML: 200 SOLUTION ORAL; RESPIRATORY (INHALATION) at 11:37

## 2024-08-29 RX ADMIN — PIPERACILLIN AND TAZOBACTAM 4.5 G: 4; .5 INJECTION, POWDER, FOR SOLUTION INTRAVENOUS at 06:21

## 2024-08-29 RX ADMIN — ACETYLCYSTEINE 2 ML: 200 SOLUTION ORAL; RESPIRATORY (INHALATION) at 07:21

## 2024-08-29 ASSESSMENT — ACTIVITIES OF DAILY LIVING (ADL)
ADLS_ACUITY_SCORE: 79
ADLS_ACUITY_SCORE: 78
ADLS_ACUITY_SCORE: 79
ADLS_ACUITY_SCORE: 78
ADLS_ACUITY_SCORE: 78
ADLS_ACUITY_SCORE: 79
ADLS_ACUITY_SCORE: 79
ADLS_ACUITY_SCORE: 78
ADLS_ACUITY_SCORE: 79
ADLS_ACUITY_SCORE: 79
ADLS_ACUITY_SCORE: 78
ADLS_ACUITY_SCORE: 78
ADLS_ACUITY_SCORE: 79
ADLS_ACUITY_SCORE: 78
ADLS_ACUITY_SCORE: 78
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
## 2024-08-29 NOTE — PROGRESS NOTES
CLINICAL NUTRITION SERVICES - REASSESSMENT NOTE    Recommendations Ordered by Registered Dietitian (RD):   Continue current EN regimen as ordered   Malnutrition:  Patient does not meet two of the above criteria necessary for diagnosing malnutrition      EVALUATION OF PROGRESS TOWARD GOALS   Diet:  NPO     Nutrition Support:  Jevity 1.5 @ 60 mL/hr x 22 hours (hold for Synthroid)  Provides 1320 mL, 1980 kcal, 85 g protein, 1003 mL free water, 28 g fiber daily.   Free Water Flush: 120 mL q 4 hours for hydration and patency    Intake/Tolerance:  NPO    ASSESSED NUTRITION NEEDS:  Dosing Weight 68 kg  Estimated Energy Needs: 6078-6179 kcals (25-30 Kcal/Kg)  Justification: maintenance  Estimated Protein Needs:  grams protein (1.2-1.5 g pro/Kg)  Justification: preservation of lean body mass  Estimated Fluid Needs: 1 mL/kcal     NEW FINDINGS:   General/Plan: stopped in room while RN was preparing to give pt nebulizer and TF was on break for synthroid administration. No issues, continue current EN regimen      Weight:   Date/Time Weight Weight Method   08/26/24 0650 68.3 kg (150 lb 9.2 oz) Bed scale   08/23/24 2000 68 kg (150 lb) --     GI: LBM 1x on yesterday    Skin: edema Mild 2+ R arm, hand    Meds: B-12, synthroid, and multivitamin with minerals,      Labs: Na 134 (L - 8/28),      Previous Goals:   TF @ goal to provide % estimated needs.   Evaluation: Met    Previous Nutrition Diagnosis:   Inadequate protein-energy intake related to transfer of care requiring TF to be held as evidenced by TF on hold since admission.   Evaluation: Improving    CURRENT NUTRITION DIAGNOSIS  Inadequate protein-energy intake related to NPO status evidenced by pt reliant on EN support to meet nutrition needs    INTERVENTIONS  Recommendations / Nutrition Prescription  Continue current EN regimen as ordered    Implementation  Enteral Nutrition - continue    Goals  TF @ goal to provide % estimated needs.     MONITORING AND  EVALUATION:  Progress towards goals will be monitored and evaluated per protocol and Practice Guideline    Evangelist Aguilera RD, LD

## 2024-08-29 NOTE — PLAN OF CARE
Goal Outcome Evaluation:    Summary: Recurrent aspiration pneumonia  DATE & TIME: 8/29/24, [id number]  Cognitive Concerns/ Orientation: HECTOR - pt nonverbal, hx TBI. Mom at bedside today,   BEHAVIOR & AGGRESSION TOOL COLOR: Green  ABNL VS/O2: VSS, on RA  MOBILITY: Ax2, lift, T&R Q2h. WC bound at baseline   PAIN MANAGMENT: Appears comfortable, no nonverbal pain indicators observed  DIET: NPO, Tube feedings at goal rate of 60 ml/hr w/ 120mL water flushes q 4hr.   BOWEL/BLADDER: Incontinent. External catheter in place. Changed x2 today  ABNL LAB/BG: Davon- 2.5 replaced x1 recheck in am  DRAIN/DEVICES: PIV L. Forearm SL, GJ tube  SKIN: Reddened buttocks and memo area, Scattered bruises, scabs, abrasions.  TESTS/PROCEDURES: None  D/C DATE/GOALS/PLACE: Likely Friday 8/30 after last dose of IV Zosyn  OTHER IMPORTANT INFO: Contact for MRSA and VRE. Frequent oral cares.

## 2024-08-29 NOTE — PLAN OF CARE
Goal Outcome Evaluation:      Plan of Care Reviewed With: patient    Overall Patient Progress: improving    DATE & TIME: 8/28/24 [id number]  Cognitive Concerns/ Orientation : HECTOR - pt nonverbal, hx TBI. Mom at bedside during the evening, plans to return in the AM. Pt is more alert  BEHAVIOR & AGGRESSION TOOL COLOR: Green  ABNL VS/O2: VSS on RA  MOBILITY: Ax2, lift, T&R Q2h. WC bound at baseline   PAIN MANAGMENT: Absence of nonverbal pain indicators   DIET: NPO. Tube feedings at goal rate of 60 ml/hr w/ 120mL water flushes q 4hr.   BOWEL/BLADDER: Incontinent. External catheter in place. Schedule Miralax held.   ABNL LAB/BG: Phos check in AM  DRAIN/DEVICES: GJ tube, L PIV  SKIN: Reddened buttocks and memo area, Scattered bruises, scabs, abrasions.  D/C DATE: Likely Thursday  OTHER IMPORTANT INFO: Contact for MRSA and VRE. Frequent oral cares.

## 2024-08-30 LAB
ANION GAP SERPL CALCULATED.3IONS-SCNC: 10 MMOL/L (ref 7–15)
BUN SERPL-MCNC: 11.1 MG/DL (ref 8–23)
CALCIUM SERPL-MCNC: 8.4 MG/DL (ref 8.8–10.4)
CHLORIDE SERPL-SCNC: 99 MMOL/L (ref 98–107)
CREAT SERPL-MCNC: 0.83 MG/DL (ref 0.67–1.17)
EGFRCR SERPLBLD CKD-EPI 2021: >90 ML/MIN/1.73M2
ERYTHROCYTE [DISTWIDTH] IN BLOOD BY AUTOMATED COUNT: 16.5 % (ref 10–15)
GLUCOSE SERPL-MCNC: 111 MG/DL (ref 70–99)
HCO3 SERPL-SCNC: 24 MMOL/L (ref 22–29)
HCT VFR BLD AUTO: 38.7 % (ref 40–53)
HGB BLD-MCNC: 12.4 G/DL (ref 13.3–17.7)
MCH RBC QN AUTO: 28.4 PG (ref 26.5–33)
MCHC RBC AUTO-ENTMCNC: 32 G/DL (ref 31.5–36.5)
MCV RBC AUTO: 89 FL (ref 78–100)
PHOSPHATE SERPL-MCNC: 1.7 MG/DL (ref 2.5–4.5)
PHOSPHATE SERPL-MCNC: 2 MG/DL (ref 2.5–4.5)
PLATELET # BLD AUTO: 303 10E3/UL (ref 150–450)
POTASSIUM SERPL-SCNC: 4.1 MMOL/L (ref 3.4–5.3)
RBC # BLD AUTO: 4.37 10E6/UL (ref 4.4–5.9)
SODIUM SERPL-SCNC: 133 MMOL/L (ref 135–145)
WBC # BLD AUTO: 5.5 10E3/UL (ref 4–11)

## 2024-08-30 PROCEDURE — 94640 AIRWAY INHALATION TREATMENT: CPT | Mod: 76

## 2024-08-30 PROCEDURE — 85027 COMPLETE CBC AUTOMATED: CPT | Performed by: HOSPITALIST

## 2024-08-30 PROCEDURE — 99232 SBSQ HOSP IP/OBS MODERATE 35: CPT | Performed by: HOSPITALIST

## 2024-08-30 PROCEDURE — 250N000013 HC RX MED GY IP 250 OP 250 PS 637: Performed by: HOSPITALIST

## 2024-08-30 PROCEDURE — 250N000013 HC RX MED GY IP 250 OP 250 PS 637: Performed by: INTERNAL MEDICINE

## 2024-08-30 PROCEDURE — 36415 COLL VENOUS BLD VENIPUNCTURE: CPT | Performed by: HOSPITALIST

## 2024-08-30 PROCEDURE — 999N000157 HC STATISTIC RCP TIME EA 10 MIN

## 2024-08-30 PROCEDURE — 84100 ASSAY OF PHOSPHORUS: CPT | Performed by: INTERNAL MEDICINE

## 2024-08-30 PROCEDURE — 250N000009 HC RX 250: Performed by: INTERNAL MEDICINE

## 2024-08-30 PROCEDURE — 84100 ASSAY OF PHOSPHORUS: CPT | Performed by: HOSPITALIST

## 2024-08-30 PROCEDURE — 94640 AIRWAY INHALATION TREATMENT: CPT

## 2024-08-30 PROCEDURE — 82374 ASSAY BLOOD CARBON DIOXIDE: CPT | Performed by: HOSPITALIST

## 2024-08-30 PROCEDURE — 120N000001 HC R&B MED SURG/OB

## 2024-08-30 RX ADMIN — ACETYLCYSTEINE 2 ML: 200 SOLUTION ORAL; RESPIRATORY (INHALATION) at 12:37

## 2024-08-30 RX ADMIN — POLYETHYLENE GLYCOL 3350 17 G: 17 POWDER, FOR SOLUTION ORAL at 09:34

## 2024-08-30 RX ADMIN — METOCLOPRAMIDE HYDROCHLORIDE 10 MG: 5 SOLUTION ORAL at 07:00

## 2024-08-30 RX ADMIN — CARBAMAZEPINE 150 MG: 100 SUSPENSION ORAL at 11:27

## 2024-08-30 RX ADMIN — METOCLOPRAMIDE HYDROCHLORIDE 10 MG: 5 SOLUTION ORAL at 22:31

## 2024-08-30 RX ADMIN — CARBAMAZEPINE 100 MG: 100 SUSPENSION ORAL at 18:18

## 2024-08-30 RX ADMIN — Medication 15 ML: at 09:36

## 2024-08-30 RX ADMIN — POTASSIUM & SODIUM PHOSPHATES POWDER PACK 280-160-250 MG 1 PACKET: 280-160-250 PACK at 09:34

## 2024-08-30 RX ADMIN — ACETYLCYSTEINE 2 ML: 200 SOLUTION ORAL; RESPIRATORY (INHALATION) at 20:00

## 2024-08-30 RX ADMIN — SULFAMETHOXAZOLE AND TRIMETHOPRIM 160 MG: 200; 40 SUSPENSION ORAL at 22:26

## 2024-08-30 RX ADMIN — Medication 40 MG: at 22:25

## 2024-08-30 RX ADMIN — BRIVARACETAM 100 MG: 10 SOLUTION ORAL at 09:36

## 2024-08-30 RX ADMIN — ALBUTEROL SULFATE 2.5 MG: 2.5 SOLUTION RESPIRATORY (INHALATION) at 08:35

## 2024-08-30 RX ADMIN — CARBAMAZEPINE 150 MG: 100 SUSPENSION ORAL at 07:00

## 2024-08-30 RX ADMIN — CYANOCOBALAMIN TAB 1000 MCG 1000 MCG: 1000 TAB at 09:36

## 2024-08-30 RX ADMIN — CARBAMAZEPINE 150 MG: 100 SUSPENSION ORAL at 00:16

## 2024-08-30 RX ADMIN — SODIUM CHLORIDE TAB 1 GM 1 G: 1 TAB at 09:36

## 2024-08-30 RX ADMIN — POTASSIUM & SODIUM PHOSPHATES POWDER PACK 280-160-250 MG 1 PACKET: 280-160-250 PACK at 14:18

## 2024-08-30 RX ADMIN — POTASSIUM & SODIUM PHOSPHATES POWDER PACK 280-160-250 MG 1 PACKET: 280-160-250 PACK at 18:14

## 2024-08-30 RX ADMIN — SULFAMETHOXAZOLE AND TRIMETHOPRIM 160 MG: 200; 40 SUSPENSION ORAL at 09:35

## 2024-08-30 RX ADMIN — ALBUTEROL SULFATE 2.5 MG: 2.5 SOLUTION RESPIRATORY (INHALATION) at 15:52

## 2024-08-30 RX ADMIN — BRIVARACETAM 100 MG: 10 SOLUTION ORAL at 22:25

## 2024-08-30 RX ADMIN — ACETYLCYSTEINE 2 ML: 200 SOLUTION ORAL; RESPIRATORY (INHALATION) at 15:53

## 2024-08-30 RX ADMIN — ACETYLCYSTEINE 2 ML: 200 SOLUTION ORAL; RESPIRATORY (INHALATION) at 08:35

## 2024-08-30 RX ADMIN — METOCLOPRAMIDE HYDROCHLORIDE 10 MG: 5 SOLUTION ORAL at 11:27

## 2024-08-30 RX ADMIN — METOCLOPRAMIDE HYDROCHLORIDE 10 MG: 5 SOLUTION ORAL at 18:13

## 2024-08-30 RX ADMIN — ALBUTEROL SULFATE 2.5 MG: 2.5 SOLUTION RESPIRATORY (INHALATION) at 20:00

## 2024-08-30 RX ADMIN — HYDROCORTISONE 7.5 MG: 5 TABLET ORAL at 14:18

## 2024-08-30 RX ADMIN — HYDROCORTISONE 15 MG: 10 TABLET ORAL at 09:37

## 2024-08-30 RX ADMIN — Medication 40 MG: at 09:35

## 2024-08-30 RX ADMIN — LEVOTHYROXINE SODIUM 100 MCG: 100 TABLET ORAL at 09:36

## 2024-08-30 RX ADMIN — SODIUM CHLORIDE TAB 1 GM 1 G: 1 TAB at 22:25

## 2024-08-30 RX ADMIN — ALBUTEROL SULFATE 2.5 MG: 2.5 SOLUTION RESPIRATORY (INHALATION) at 12:37

## 2024-08-30 ASSESSMENT — ACTIVITIES OF DAILY LIVING (ADL)
ADLS_ACUITY_SCORE: 79
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
## 2024-08-30 NOTE — PROGRESS NOTES
Care Management Follow Up    Length of Stay (days): 7    Expected Discharge Date: 08/31/2024     Concerns to be Addressed:       Patient plan of care discussed at interdisciplinary rounds: Yes    Anticipated Discharge Disposition:                Anticipated Discharge Services:    Anticipated Discharge DME:      Patient/family educated on Medicare website which has current facility and service quality ratings:    Education Provided on the Discharge Plan:    Patient/Family in Agreement with the Plan:      Referrals Placed by CM/SW:    Private pay costs discussed: Not applicable    Discussed  Partnership in Safe Discharge Planning  document with patient/family: No     Handoff Completed: Yes, [facility] PCP: Internal handoff referral completed    Additional Information:  Patient's discharge was held for today.  [facility] Stretcher ride now scheduled for tomorrow Saturday 8/31 with a  window of [phone].  Left message with Mother Savannah at this time.    Next Steps: anticipated discharge Saturday 8/31 as noted above.      Pilar Benavides RN  Inpatient Float Care Coordinator  [facility]

## 2024-08-30 NOTE — PLAN OF CARE
Goal Outcome Evaluation:  SUMMARY: Asp PNA  DATE & TIME: 8/30/24 5972-8282  Cognitive Concerns/ Orientation : HECTOR, pt nonverbal. Occasional moaning with cares.   BEHAVIOR & AGGRESSION TOOL COLOR: Green   ABNL VS/O2: VSS on RA  MOBILITY: Assist x2 with lift, fall risk. Reposition q2h.  PAIN MANAGMENT: No signs of pain  DIET: NPO, tube feeding infusing at 60ml/hr with 120ml H2O flushes q4h  BOWEL/BLADDER: Incontinent of bowel and bladder. External catheter in place. 2x large loose BM's this shift.   ABNL LAB/BG: Phos 2.0 & 1.7. Replacement in proccess, recheck tomorrow AM. Na 133  DRAIN/DEVICES: L PIV SL. GJ tube infusing tube feeds.   SKIN: Redness blanchable on coccyx/buttocks. Rash in memo area- powder applied. Scattered bruises and scabs.  D/C DATE: Tomorrow - 08/31 - ride set up for [phone]. Home with Mom.   OTHER IMPORTANT INFO: Contact isolation. Suctioned PRN for secretions.

## 2024-08-30 NOTE — PLAN OF CARE
DATE & TIME: 8/29/24 [id number]    Cognitive Concerns/ Orientation : HECTOR, pt nonverbal.   BEHAVIOR & AGGRESSION TOOL COLOR: Green   ABNL VS/O2: VSS on RA  MOBILITY: Assist x2 with lift, fall risk. Reposition q2h.  PAIN MANAGMENT: No signs of pain  DIET: NPO, tube feeding infusing at 60ml/hr with 120ml H2O flushes q4h  BOWEL/BLADDER: Incontinent of bowel and bladder. External catheter in place. Soft BM x1.  ABNL LAB/BG: Phos 2.5, replaced. Recheck in AM  DRAIN/DEVICES: IV SL. GJ tube infusing  SKIN: Buttocks and memo area reddened, Mepilex to buttock. Scattered bruises and scabs.  D/C DATE: Plan for discharge likely tomorrow after last dose IV Zosyn  OTHER IMPORTANT INFO: Contact isolation. Lung sounds diminished. Suctioned x1 for secretions.

## 2024-08-30 NOTE — PLAN OF CARE
Goal Outcome Evaluation:       DATE & TIME: 8/29/24-8/30/24 [id number]   Cognitive Concerns/ Orientation : HECTOR, pt nonverbal.   BEHAVIOR & AGGRESSION TOOL COLOR: Green   ABNL VS/O2: VSS on RA  MOBILITY: Assist x2 with lift, fall risk. Reposition q2h.  PAIN MANAGMENT: No signs of pain  DIET: NPO, tube feeding infusing at 60ml/hr with 120ml H2O flushes q4h  BOWEL/BLADDER: Incontinent of bowel and bladder. External catheter in place.  ABNL LAB/BG: none new  DRAIN/DEVICES: IV SL. GJ tube infusing  SKIN: Buttocks and memo area reddened, Mepilex to buttock. Scattered bruises and scabs.  D/C DATE: Plan for discharge likely today after last dose IV Zosyn  OTHER IMPORTANT INFO: Contact isolation. Lung sounds coarse. Suctioned x1 for secretions. Very tired throughout the night

## 2024-08-31 VITALS
WEIGHT: 147.71 LBS | HEART RATE: 80 BPM | TEMPERATURE: 97.3 F | RESPIRATION RATE: 20 BRPM | SYSTOLIC BLOOD PRESSURE: 114 MMHG | BODY MASS INDEX: 21.19 KG/M2 | OXYGEN SATURATION: 95 % | DIASTOLIC BLOOD PRESSURE: 61 MMHG

## 2024-08-31 LAB — PHOSPHATE SERPL-MCNC: 2.4 MG/DL (ref 2.5–4.5)

## 2024-08-31 PROCEDURE — 250N000013 HC RX MED GY IP 250 OP 250 PS 637: Performed by: INTERNAL MEDICINE

## 2024-08-31 PROCEDURE — 99239 HOSP IP/OBS DSCHRG MGMT >30: CPT | Performed by: HOSPITALIST

## 2024-08-31 PROCEDURE — 84100 ASSAY OF PHOSPHORUS: CPT | Performed by: HOSPITALIST

## 2024-08-31 PROCEDURE — 36415 COLL VENOUS BLD VENIPUNCTURE: CPT | Performed by: HOSPITALIST

## 2024-08-31 PROCEDURE — 250N000009 HC RX 250: Performed by: INTERNAL MEDICINE

## 2024-08-31 PROCEDURE — 250N000013 HC RX MED GY IP 250 OP 250 PS 637: Performed by: HOSPITALIST

## 2024-08-31 PROCEDURE — 999N000157 HC STATISTIC RCP TIME EA 10 MIN

## 2024-08-31 PROCEDURE — 94640 AIRWAY INHALATION TREATMENT: CPT

## 2024-08-31 PROCEDURE — 94640 AIRWAY INHALATION TREATMENT: CPT | Mod: 76

## 2024-08-31 RX ORDER — SULFAMETHOXAZOLE AND TRIMETHOPRIM 200; 40 MG/5ML; MG/5ML
20 SUSPENSION ORAL 2 TIMES DAILY
Qty: 180 ML | Refills: 0 | Status: SHIPPED | OUTPATIENT
Start: 2024-08-31 | End: 2024-09-05

## 2024-08-31 RX ADMIN — POTASSIUM & SODIUM PHOSPHATES POWDER PACK 280-160-250 MG 1 PACKET: 280-160-250 PACK at 09:53

## 2024-08-31 RX ADMIN — ALBUTEROL SULFATE 2.5 MG: 2.5 SOLUTION RESPIRATORY (INHALATION) at 07:23

## 2024-08-31 RX ADMIN — HYDROCORTISONE 15 MG: 10 TABLET ORAL at 11:18

## 2024-08-31 RX ADMIN — Medication 15 ML: at 09:53

## 2024-08-31 RX ADMIN — CYANOCOBALAMIN TAB 1000 MCG 1000 MCG: 1000 TAB at 09:54

## 2024-08-31 RX ADMIN — METOCLOPRAMIDE HYDROCHLORIDE 10 MG: 5 SOLUTION ORAL at 11:16

## 2024-08-31 RX ADMIN — BRIVARACETAM 100 MG: 10 SOLUTION ORAL at 09:53

## 2024-08-31 RX ADMIN — CARBAMAZEPINE 150 MG: 100 SUSPENSION ORAL at 06:19

## 2024-08-31 RX ADMIN — ACETYLCYSTEINE 2 ML: 200 SOLUTION ORAL; RESPIRATORY (INHALATION) at 07:23

## 2024-08-31 RX ADMIN — POTASSIUM & SODIUM PHOSPHATES POWDER PACK 280-160-250 MG 1 PACKET: 280-160-250 PACK at 11:17

## 2024-08-31 RX ADMIN — CARBAMAZEPINE 150 MG: 100 SUSPENSION ORAL at 11:15

## 2024-08-31 RX ADMIN — SODIUM CHLORIDE TAB 1 GM 1 G: 1 TAB at 11:19

## 2024-08-31 RX ADMIN — ACETYLCYSTEINE 2 ML: 200 SOLUTION ORAL; RESPIRATORY (INHALATION) at 10:31

## 2024-08-31 RX ADMIN — POTASSIUM & SODIUM PHOSPHATES POWDER PACK 280-160-250 MG 1 PACKET: 280-160-250 PACK at 11:30

## 2024-08-31 RX ADMIN — Medication 40 MG: at 09:53

## 2024-08-31 RX ADMIN — LEVOTHYROXINE SODIUM 100 MCG: 100 TABLET ORAL at 09:53

## 2024-08-31 RX ADMIN — SULFAMETHOXAZOLE AND TRIMETHOPRIM 160 MG: 200; 40 SUSPENSION ORAL at 09:53

## 2024-08-31 RX ADMIN — POLYETHYLENE GLYCOL 3350 17 G: 17 POWDER, FOR SOLUTION ORAL at 09:54

## 2024-08-31 RX ADMIN — METOCLOPRAMIDE HYDROCHLORIDE 10 MG: 5 SOLUTION ORAL at 06:37

## 2024-08-31 RX ADMIN — ALBUTEROL SULFATE 2.5 MG: 2.5 SOLUTION RESPIRATORY (INHALATION) at 10:31

## 2024-08-31 RX ADMIN — CARBAMAZEPINE 150 MG: 100 SUSPENSION ORAL at 00:21

## 2024-08-31 ASSESSMENT — ACTIVITIES OF DAILY LIVING (ADL)
ADLS_ACUITY_SCORE: 79
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
## 2024-08-31 NOTE — PLAN OF CARE
Goal Outcome Evaluation:       SUMMARY: Asp PNA  DATE & TIME: 8/30/24-8/31/24 [id number]  Cognitive Concerns/ Orientation : HECTOR, pt nonverbal. Occasional moaning with cares.   BEHAVIOR & AGGRESSION TOOL COLOR: Green   ABNL VS/O2: VSS on RA  MOBILITY: Assist x2 with lift, fall risk. Reposition q2h.  PAIN MANAGMENT: No signs of pain  DIET: NPO, tube feeding infusing at 60ml/hr with 120ml H2O flushes q4h  BOWEL/BLADDER: Incontinent of bowel and bladder. External catheter in place- good output  ABNL LAB/BG: none new  DRAIN/DEVICES: PIV removed d/t dislodged- MD aware, can wait until AM if needing to be replaced  SKIN: Redness blanchable on coccyx/buttocks. Rash in memo area- powder applied. Scattered bruises and scabs.  D/C DATE: Today - 08/31 - ride set up for [id number]. Home with Mom.   OTHER IMPORTANT INFO: Contact isolation. Suctioned PRN for secretions.

## 2024-08-31 NOTE — PLAN OF CARE
Goal Outcome Evaluation:  SUMMARY: Asp PNA  DATE & TIME: 08/31/2024 [id number]  Cognitive Concerns/ Orientation : HECTOR, pt nonverbal. Occasional moaning with cares.   BEHAVIOR & AGGRESSION TOOL COLOR: Green                                                              ABNL VS/O2: VSS on Rm Air; frequent productive thick sputum   MOBILITY: Asstx2 w/ lift; turn & repo  PAIN MANAGMENT: No signs of pain  DIET: NPO, tube feeding infusing at 60ml/hr with 120ml H2O flushes q4h  BOWEL/BLADDER: Incontinent of bowel and bladder. External catheter in place- good output, no BM  ABNL LAB/BG: phos 2.4; replacement given per protocol  DRAIN/DEVICES: No IV access, patient discharging today; no need for new IV access  SKIN: Redness blanchable on coccyx/buttocks. Rash in memo area- powder applied. Scattered bruises and scabs.  D/C DATE: Today - 08/31 - ride set up for [id number]. Home with Mom.   OTHER IMPORTANT INFO: Contact isolation. Suctioned PRN for secretions. Mother observed at patient's bedside, PO oral care given by mother. Mother left, writer heard patient's O2 level alarming in room at 85% w/ shallow respirations on Rm Air. Writer attempted to suction patient per orders; removed large amount of thick yellow sputum, patient now sats 91-93% on Rm Air. [facility], mother at home awaiting patient's arrival. Patient belongings & discharge meds sent w/ patient.

## 2024-08-31 NOTE — PROGRESS NOTES
Care Management Discharge Note    Discharge Date: 08/31/2024       Discharge Disposition:  Home    Discharge Services:  resume Home Care    Discharge DME:      Discharge Transportation:  Stretcher due to functional quadriplegia; non verbal    Private pay costs discussed: Not applicable    Does the patient's insurance plan have a 3 day qualifying hospital stay waiver?  Yes     Which insurance plan 3 day waiver is available? [employer] REACH    Will the waiver be used for post-acute placement? No    PAS Confirmation Code:    Patient/family educated on Medicare website which has current facility and service quality ratings:      Education Provided on the Discharge Plan:    Persons Notified of Discharge Plans: bedside nurse spoke with Mother this morning; she came to drop off clothing  Patient/Family in Agreement with the Plan:      Handoff Referral Completed: Yes, JOSÉ MIGUEL PCP: Internal handoff referral completed    Additional Information:  Patient discharging this afternoon.    Stretcher ride with [facility]  window of [phone]; Mother informed of time yesterday; she came today to drop off clothing.    Resume home care order is on the AVS.  [facility] was notified of patient's discharge today and they will pull orders from [facility]    He has virtual visit with PCP on 9/5 at 8:25 am.    He has previously scheduled appointments with Pulmonology on Sept 6th 1:15 pm and [facility] on Sept 26 12:00 pm.  These are on the AVS.    Pilar Benavides RN  Inpatient Float Care Coordinator  [facility]

## 2024-08-31 NOTE — PLAN OF CARE
Goal Outcome Evaluation:  SUMMARY: Asp PNA  DATE & TIME: 8/30/24 5119-2788  Cognitive Concerns/ Orientation : HECTOR, pt nonverbal. Occasional moaning with cares.   BEHAVIOR & AGGRESSION TOOL COLOR: Green   ABNL VS/O2: VSS on RA  MOBILITY: Assist x2 with lift, fall risk. Reposition q2h.  PAIN MANAGMENT: No signs of pain  DIET: NPO, tube feeding infusing at 60ml/hr with 120ml H2O flushes q4h  BOWEL/BLADDER: Incontinent of bowel and bladder. External catheter in place. 2x large loose BM's this shift.   ABNL LAB/BG: none new  DRAIN/DEVICES: phos recheck AM.  SKIN: Redness blanchable on coccyx/buttocks. Rash in memo area- powder applied. Scattered bruises and scabs.  D/C DATE: Tomorrow - 08/31 - ride set up for [id number]. Home with Mom.   OTHER IMPORTANT INFO: Contact isolation. Suctioned PRN for secretions.

## 2024-08-31 NOTE — PROVIDER NOTIFICATION
MD Notification    Notified Person: MD    Notified Person Name: Radha    Notification Date/Time: 0335 08/31/24     Notification Interaction: vocera    Purpose of Notification: IV became dislodged and could not flush, had to remove. Can he go without a replacement?    Orders Received: Can wait until AM     Comments:

## 2024-09-03 ENCOUNTER — TELEPHONE (OUTPATIENT)
Dept: PHARMACY | Facility: OTHER | Age: 64
End: 2024-09-03
Payer: MEDICARE

## 2024-09-03 ENCOUNTER — PATIENT OUTREACH (OUTPATIENT)
Dept: CARE COORDINATION | Facility: CLINIC | Age: 64
End: 2024-09-03
Payer: MEDICARE

## 2024-09-03 NOTE — LETTER
[facility] CARE COORDINATION  [address] [place] [address]    September 4, 2024    Keyon Farias  [address] [address]      Dear Keyon,    I am a clinic care coordinator who works with Elsa Queen MD with the [facility]. I wanted to introduce myself and provide you with my contact information for you to be able to call me with any questions or concerns. I wanted to thank you for spending the time to talk with me.  Below is a description of clinic care coordination and how I can further assist you.       The clinic care coordination team is made up of a registered nurse, , financial resource worker and community health worker who understand the health care system. The goal of clinic care coordination is to help you manage your health and improve access to the health care system. Our team works alongside your provider to assist you in determining your health and social needs. We can help you obtain health care and community resources, providing you with necessary information and education. We can work with you through any barriers and develop a care plan that helps coordinate and strengthen the communication between you and your care team.  Our services are voluntary and are offered without charge to you personally.    Please feel free to contact me with any questions or concerns regarding care coordination and what we can offer.      We are focused on providing you with the highest-quality healthcare experience possible.    Sincerely,     Faby Dumas RN, BSN, CPHN, CCM  [facility] Ambulatory Care Management  [facility]  On behalf of Courtney Nuñez RN CC  Courtney Nuñez RN Clinic Care Coordinator  [facility]: [place], [place] (on-site Wednesdays), [facility] (on-site Thursdays) & [facility].  [email or web address]  Phone: [phone]     Enclosed: Additional information  on Care Coordination.     WHAT IS CARE COORDINATION?      [facility] Care Coordination supports patients and families dealing with chronic or complex health conditions, developmental issues, and social service needs. This service is available to patients of all ages, from babies to seniors. When you re facing a difficult decision about caring for yourself or someone you love, we can help you understand your options. We identify and refer you to community resources that help with financial, legal, mental health, transportation, and other issues. We also help with your medical and related education needs.     IS CARE COORDINATION RIGHT FOR ME? Discuss a referral to Care Coordination with your primary care provider or care team member.     HOW CAN I CONNECT WITH CARE COORDINATION?  Contact your clinic.  Speak with your doctor or clinic staff.  Discuss care coordination with hospital staff before discharge.     MEET YOUR CARE COORDINATION TEAM     Registered Nurse Care Coordinator  Provides education on medications, disease management, and new diagnoses.  Addresses concerns about medical conditions and connects you to resources.  Develops patient-centered goals and communicates with patient s care team.     Social Work Care Coordinator  Provides education on emotional wellbeing.  Connects you to a variety of community-based resources.  Communicates with care team and community partners.     Community Health Worker  Identifies health and social barriers and connects you with community resources.  Develops nonclinical patient and family centered goals.  Enhances communication between patients and care teams.     Financial Resource Worker  Assists patients with applying for health insurance ([place], Laura, Simon).  Helps patients with applying for county benefits.  Connects patients with [facility].

## 2024-09-03 NOTE — TELEPHONE ENCOUNTER
MTM referral from: Transitions of Care (recent hospital discharge or ED visit)    MTM referral outreach attempt #1 on September 3, 2024 at 11:52 AM      Outcome: Spoke with patient guardian declined    Use VBC, symone for the carrier/Plan on the flowsheet          Beba Cárdenas [facility]  -[facility]  [phone]

## 2024-09-03 NOTE — PROGRESS NOTES
Clinic Care Coordination Contact  Transitions of Care Outreach  Chief Complaint   Patient presents with    Clinic Care Coordination - Initial    Clinic Care Coordination - Post Hospital     Most Recent Admission Date: 8/23/2024   Most Recent Admission Diagnosis: Aspiration pneumonia, unspecified aspiration pneumonia type, unspecified laterality, unspecified part of lung (H) - J69.0     Most Recent Discharge Date: 8/31/2024   Most Recent Discharge Diagnosis: Aspiration pneumonia, unspecified aspiration pneumonia type, unspecified laterality, unspecified part of lung (H) - J69.0  Hyperlipidemia, unspecified hyperlipidemia type - E78.5  Aspiration pneumonia of right middle lobe, unspecified aspiration pneumonia type (H) - J69.0  Constipation, unspecified constipation type - K59.00  Acute cystitis without hematuria - N30.00     Transitions of Care Assessment    Discharge Assessment  How are you doing now that you are home?: He's doing really good.  How are your symptoms? (Red Flag symptoms escalate to triage hotline per guidelines): Improved  Do you know how to contact your clinic care team if you have future questions or changes to your health status? : Yes  Does the patient have their discharge instructions? : Yes  Does the patient have questions regarding their discharge instructions? : No  Were you started on any new medications or were there changes to any of your previous medications? : Yes  Does the patient have all of their medications?: Yes  Do you have questions regarding any of your medications? : No  Do you have all of your needed medical supplies or equipment (DME)?  (i.e. oxygen tank, CPAP, cane, etc.): Yes    Post-op (CHW CTA Only)  If the patient had a surgery or procedure, do they have any questions for a nurse?: No    Post-op (Clinicians Only)  Did the patient have surgery or a procedure: No  Fever: No  Chills: No  Eating & Drinking:  (Tube feeds of Jevity)  PO Intake:  (5 Jevity bottles per  day.)  Bowel Function: loose stools  Date of last BM: 09/04/24  Urinary Status: voiding without complaint/concerns (Won't use the urinal any more.)    Follow up Plan     Discharge Follow-Up  Discharge follow up appointment scheduled in alignment with recommended follow up timeframe or Transitions of Risk Category? (Low = within 30 days; Moderate= within 14 days; High= within 7 days): Yes  Discharge Follow Up Appointment Date: 09/05/24  Discharge Follow Up Appointment Scheduled with?: Primary Care Provider    RN CC contacted patient for post-hospital follow up and to introduce Care Coordination. Patient is nonverbal. Spoke with his mother, Savannah, who is his guardian. Full assessment not indicated as patient declined to have Care Coordination support at this time. She was agreeable to receiving an introduction letter with additional information on Care Coordination via [employer]. Verified patient address in chart is valid.     RN CC will send patient introduction letter via [employer].     Addendum: RN CC printed introduction letter while in clinic on 9/5/24 and mailed to patient's guardian, Savannah, at his home via the [employer] Postal Service.     Future Appointments   Date Time Provider Department Center   9/5/2024  8:30 AM Elsa Queen MD [facility]    9/6/2024  1:30 PM Mark Huntley MD [facility]    9/26/2024 12:15 PM Juana Tierney PA-C [facility]   10/7/2024  1:00 PM CS LAB [facility] CS     Outpatient Plan as outlined on AVS reviewed with patient.    For any urgent concerns, please contact our 24 hour nurse triage line: [phone] ([phone])       Faby Dumas RN, BSN, CPHN, [facility]    Phone: [phone]  Email: [email or web address]                  Clinic Care Coordination Contact  [facility]    Clinical Data: Care Coordinator Outreach    Outreach Documentation Number of Outreach Attempt   7/29/2024   1:48  PM 1   7/31/2024  10:06 AM 2   9/3/2024  10:40 AM 1     Left message on patient's guardian's voicemail with call back information and requested return call.    Plan: Care Coordinator will try to reach patient again in 1-2 business days.    Faby Dumas RN, BSN, CPHN, [facility] Ambulatory Care Management  [facility]  Phone: [phone]  Email: [email or web address]  (On behalf of Courtney Nuñez RN CC)

## 2024-09-04 ENCOUNTER — TELEPHONE (OUTPATIENT)
Dept: PULMONOLOGY | Facility: CLINIC | Age: 64
End: 2024-09-04
Payer: MEDICARE

## 2024-09-04 NOTE — TELEPHONE ENCOUNTER
Spoke with Dr Huntley. Due to patient's complexity, patient would need to be seen in-person. Called patient's guardian/mother who verbalized understanding. Patinet rescheduled to next available new with Dr Huntley. Patient placed on wait list as well.    Yoel Knight RN
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
## 2024-09-05 ENCOUNTER — VIRTUAL VISIT (OUTPATIENT)
Dept: FAMILY MEDICINE | Facility: CLINIC | Age: 64
End: 2024-09-05
Payer: MEDICARE

## 2024-09-05 DIAGNOSIS — R68.89 EXCESSIVE ORAL SECRETIONS: ICD-10-CM

## 2024-09-05 DIAGNOSIS — S06.9X9S TRAUMATIC BRAIN INJURY WITH LOSS OF CONSCIOUSNESS, SEQUELA (H): Primary | ICD-10-CM

## 2024-09-05 DIAGNOSIS — R13.10 DYSPHAGIA, UNSPECIFIED TYPE: ICD-10-CM

## 2024-09-05 DIAGNOSIS — G81.01 FLACCID HEMIPLEGIA AFFECTING RIGHT DOMINANT SIDE, UNSPECIFIED ETIOLOGY (H): ICD-10-CM

## 2024-09-05 DIAGNOSIS — Z12.11 SCREEN FOR COLON CANCER: ICD-10-CM

## 2024-09-05 DIAGNOSIS — J69.0 ASPIRATION PNEUMONITIS (H): ICD-10-CM

## 2024-09-05 DIAGNOSIS — E44.1 MILD PROTEIN-CALORIE MALNUTRITION (H): ICD-10-CM

## 2024-09-05 DIAGNOSIS — N18.1 CKD (CHRONIC KIDNEY DISEASE) STAGE 1, GFR 90 ML/MIN OR GREATER: ICD-10-CM

## 2024-09-05 PROCEDURE — 99495 TRANSJ CARE MGMT MOD F2F 14D: CPT | Mod: 95 | Performed by: INTERNAL MEDICINE

## 2024-09-05 RX ORDER — GLYCOPYRROLATE 1 MG/1
2 TABLET ORAL 2 TIMES DAILY
Qty: 180 TABLET | Refills: 3 | Status: SHIPPED | OUTPATIENT
Start: 2024-09-05
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
## 2024-09-07 ENCOUNTER — APPOINTMENT (OUTPATIENT)
Dept: GENERAL RADIOLOGY | Facility: CLINIC | Age: 64
End: 2024-09-07
Attending: EMERGENCY MEDICINE
Payer: MEDICARE

## 2024-09-07 ENCOUNTER — HOSPITAL ENCOUNTER (EMERGENCY)
Facility: CLINIC | Age: 64
Discharge: HOME | End: 2024-09-07
Attending: EMERGENCY MEDICINE | Admitting: EMERGENCY MEDICINE
Payer: MEDICARE

## 2024-09-07 VITALS
OXYGEN SATURATION: 96 % | TEMPERATURE: 98.6 F | DIASTOLIC BLOOD PRESSURE: 67 MMHG | SYSTOLIC BLOOD PRESSURE: 130 MMHG | RESPIRATION RATE: 20 BRPM | HEART RATE: 63 BPM

## 2024-09-07 DIAGNOSIS — R50.9 FEVER, UNSPECIFIED FEVER CAUSE: ICD-10-CM

## 2024-09-07 LAB
ALBUMIN SERPL BCG-MCNC: 3.8 G/DL (ref 3.5–5.2)
ALBUMIN UR-MCNC: 20 MG/DL
ALP SERPL-CCNC: 107 U/L (ref 40–150)
ALT SERPL W P-5'-P-CCNC: 31 U/L (ref 0–70)
ANION GAP SERPL CALCULATED.3IONS-SCNC: 11 MMOL/L (ref 7–15)
APPEARANCE UR: CLEAR
AST SERPL W P-5'-P-CCNC: 39 U/L (ref 0–45)
BASOPHILS # BLD AUTO: 0.1 10E3/UL (ref 0–0.2)
BASOPHILS NFR BLD AUTO: 2 %
BILIRUB DIRECT SERPL-MCNC: <0.2 MG/DL (ref 0–0.3)
BILIRUB SERPL-MCNC: 0.2 MG/DL
BILIRUB UR QL STRIP: NEGATIVE
BUN SERPL-MCNC: 18.3 MG/DL (ref 8–23)
CALCIUM SERPL-MCNC: 8.7 MG/DL (ref 8.8–10.4)
CHLORIDE SERPL-SCNC: 106 MMOL/L (ref 98–107)
COLOR UR AUTO: YELLOW
CREAT SERPL-MCNC: 0.84 MG/DL (ref 0.67–1.17)
EGFRCR SERPLBLD CKD-EPI 2021: >90 ML/MIN/1.73M2
EOSINOPHIL # BLD AUTO: 0.4 10E3/UL (ref 0–0.7)
EOSINOPHIL NFR BLD AUTO: 4 %
ERYTHROCYTE [DISTWIDTH] IN BLOOD BY AUTOMATED COUNT: 16.3 % (ref 10–15)
FLUAV RNA SPEC QL NAA+PROBE: NEGATIVE
FLUBV RNA RESP QL NAA+PROBE: NEGATIVE
GLUCOSE SERPL-MCNC: 120 MG/DL (ref 70–99)
GLUCOSE UR STRIP-MCNC: NEGATIVE MG/DL
HCO3 SERPL-SCNC: 25 MMOL/L (ref 22–29)
HCT VFR BLD AUTO: 43.8 % (ref 40–53)
HGB BLD-MCNC: 14.1 G/DL (ref 13.3–17.7)
HGB UR QL STRIP: NEGATIVE
IMM GRANULOCYTES # BLD: 0 10E3/UL
IMM GRANULOCYTES NFR BLD: 0 %
KETONES UR STRIP-MCNC: NEGATIVE MG/DL
LACTATE SERPL-SCNC: 1.6 MMOL/L (ref 0.7–2)
LEUKOCYTE ESTERASE UR QL STRIP: NEGATIVE
LYMPHOCYTES # BLD AUTO: 2.3 10E3/UL (ref 0.8–5.3)
LYMPHOCYTES NFR BLD AUTO: 28 %
MCH RBC QN AUTO: 28.3 PG (ref 26.5–33)
MCHC RBC AUTO-ENTMCNC: 32.2 G/DL (ref 31.5–36.5)
MCV RBC AUTO: 88 FL (ref 78–100)
MONOCYTES # BLD AUTO: 0.6 10E3/UL (ref 0–1.3)
MONOCYTES NFR BLD AUTO: 8 %
MUCOUS THREADS #/AREA URNS LPF: PRESENT /LPF
NEUTROPHILS # BLD AUTO: 4.7 10E3/UL (ref 1.6–8.3)
NEUTROPHILS NFR BLD AUTO: 58 %
NITRATE UR QL: NEGATIVE
NRBC # BLD AUTO: 0 10E3/UL
NRBC BLD AUTO-RTO: 0 /100
PH UR STRIP: 8 [PH] (ref 5–7)
PLATELET # BLD AUTO: 267 10E3/UL (ref 150–450)
POTASSIUM SERPL-SCNC: 4.3 MMOL/L (ref 3.4–5.3)
PROT SERPL-MCNC: 7.6 G/DL (ref 6.4–8.3)
RBC # BLD AUTO: 4.99 10E6/UL (ref 4.4–5.9)
RBC URINE: <1 /HPF
RSV RNA SPEC NAA+PROBE: NEGATIVE
SARS-COV-2 RNA RESP QL NAA+PROBE: NEGATIVE
SODIUM SERPL-SCNC: 142 MMOL/L (ref 135–145)
SP GR UR STRIP: 1.02 (ref 1–1.03)
UROBILINOGEN UR STRIP-MCNC: NORMAL MG/DL
WBC # BLD AUTO: 8.2 10E3/UL (ref 4–11)
WBC URINE: <1 /HPF

## 2024-09-07 PROCEDURE — 87637 SARSCOV2&INF A&B&RSV AMP PRB: CPT | Performed by: EMERGENCY MEDICINE

## 2024-09-07 PROCEDURE — 85025 COMPLETE CBC W/AUTO DIFF WBC: CPT | Performed by: EMERGENCY MEDICINE

## 2024-09-07 PROCEDURE — 99284 EMERGENCY DEPT VISIT MOD MDM: CPT | Mod: 25

## 2024-09-07 PROCEDURE — 36415 COLL VENOUS BLD VENIPUNCTURE: CPT | Performed by: EMERGENCY MEDICINE

## 2024-09-07 PROCEDURE — 258N000003 HC RX IP 258 OP 636: Performed by: EMERGENCY MEDICINE

## 2024-09-07 PROCEDURE — 81001 URINALYSIS AUTO W/SCOPE: CPT | Performed by: EMERGENCY MEDICINE

## 2024-09-07 PROCEDURE — 71045 X-RAY EXAM CHEST 1 VIEW: CPT

## 2024-09-07 PROCEDURE — 82040 ASSAY OF SERUM ALBUMIN: CPT | Performed by: EMERGENCY MEDICINE

## 2024-09-07 PROCEDURE — 83605 ASSAY OF LACTIC ACID: CPT | Performed by: EMERGENCY MEDICINE

## 2024-09-07 PROCEDURE — 96360 HYDRATION IV INFUSION INIT: CPT

## 2024-09-07 RX ADMIN — SODIUM CHLORIDE 1000 ML: 9 INJECTION, SOLUTION INTRAVENOUS at 17:46

## 2024-09-07 ASSESSMENT — ACTIVITIES OF DAILY LIVING (ADL)
ADLS_ACUITY_SCORE: 41

## 2024-09-07 ASSESSMENT — COLUMBIA-SUICIDE SEVERITY RATING SCALE - C-SSRS
6. HAVE YOU EVER DONE ANYTHING, STARTED TO DO ANYTHING, OR PREPARED TO DO ANYTHING TO END YOUR LIFE?: NO
1. IN THE PAST MONTH, HAVE YOU WISHED YOU WERE DEAD OR WISHED YOU COULD GO TO SLEEP AND NOT WAKE UP?: NO
2. HAVE YOU ACTUALLY HAD ANY THOUGHTS OF KILLING YOURSELF IN THE PAST MONTH?: NO

## 2024-09-07 NOTE — ED PROVIDER NOTES
Patient signed out to myself awaiting urinalysis.  Urine is negative for infection.  Patient looks safe for discharge.  He has a negative infectious workup.     Randy Avila MD  09/07/24 5192
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
## 2024-09-07 NOTE — ED TRIAGE NOTES
Patient BIBA from home after mother called and states that patient had a fever that started last night. Mother states that his fever got up to 100.9. patient is at baseline, more alert today. Denies pain.

## 2024-09-08 NOTE — ED NOTES
Guardian, Savannah, called to notify transport here and Keyon will be on his way soon. Savannah confirmed no stairs to the house, there is a ramp in the garage that connects to the kitchen.

## 2024-09-11 ENCOUNTER — MEDICAL CORRESPONDENCE (OUTPATIENT)
Dept: HEALTH INFORMATION MANAGEMENT | Facility: CLINIC | Age: 64
End: 2024-09-11

## 2024-09-12 NOTE — PROVIDER NOTIFICATION
MD Notification    Notified Person: MD    Notified Person Name:     Notification Date/Time:05/10/22  1301    Notification Interaction: joseph paged.     Purpose of Notification: I took a call regarding this patient from the [facility] lab.  The lab ran what is called a verigene for rapid blood culture identification and the results are a staphylococcus species.       Comments:  Will update bedside RN.     
MD Notification    Notified Person: MD    Notified Person Name: Dr. Costa     Notification Date/Time: 5/10/22 @1020    Notification Interaction: [employer] messaging     Purpose of Notification: critical lab: positive blood cultures-gram positive cocci in clusters     Orders Received: redraw blood cultures     Comments:    
MD Notification    Notified Person: MD    Notified Person Name:Igor    Notification Date/Time:5-11 1341    Notification Interaction:  Web page  Purpose of Notification:Positive blood culture, staph.    Orders Received:    Comments:  
MD Notification    Notified Person: MD    Notified Person Name:Igor    Notification Date/Time:5-11 1412    Notification Interaction:  Web page  Purpose of Notification:  Fyi pt desated. Went from room air to 90% on 5 L oxymask. Mentioned new positive blood culture results in prev message.     Orders Received: Stat xray. + blood culture is from prev contaminant    Comments:  
[Time Spent: ___ minutes] : I have spent [unfilled] minutes of time on the encounter which excludes teaching and separately reported services.

## 2024-09-13 ENCOUNTER — NURSE TRIAGE (OUTPATIENT)
Dept: NURSING | Facility: CLINIC | Age: 64
End: 2024-09-13
Payer: MEDICARE

## 2024-09-13 ENCOUNTER — APPOINTMENT (OUTPATIENT)
Dept: GENERAL RADIOLOGY | Facility: CLINIC | Age: 64
DRG: 871 | End: 2024-09-13
Attending: EMERGENCY MEDICINE
Payer: MEDICARE

## 2024-09-13 ENCOUNTER — HOSPITAL ENCOUNTER (EMERGENCY)
Facility: CLINIC | Age: 64
Discharge: HOME | DRG: 871 | End: 2024-09-14
Attending: EMERGENCY MEDICINE | Admitting: EMERGENCY MEDICINE
Payer: MEDICARE

## 2024-09-13 DIAGNOSIS — J69.0 ASPIRATION PNEUMONIA OF RIGHT LOWER LOBE DUE TO GASTRIC SECRETIONS (H): ICD-10-CM

## 2024-09-13 LAB
ANION GAP SERPL CALCULATED.3IONS-SCNC: 10 MMOL/L (ref 7–15)
BASOPHILS # BLD AUTO: 0.1 10E3/UL (ref 0–0.2)
BASOPHILS NFR BLD AUTO: 1 %
BUN SERPL-MCNC: 24.1 MG/DL (ref 8–23)
CALCIUM SERPL-MCNC: 8.8 MG/DL (ref 8.8–10.4)
CHLORIDE SERPL-SCNC: 104 MMOL/L (ref 98–107)
CREAT SERPL-MCNC: 0.85 MG/DL (ref 0.67–1.17)
EGFRCR SERPLBLD CKD-EPI 2021: >90 ML/MIN/1.73M2
EOSINOPHIL # BLD AUTO: 0.5 10E3/UL (ref 0–0.7)
EOSINOPHIL NFR BLD AUTO: 5 %
ERYTHROCYTE [DISTWIDTH] IN BLOOD BY AUTOMATED COUNT: 16.1 % (ref 10–15)
GLUCOSE SERPL-MCNC: 114 MG/DL (ref 70–99)
HCO3 SERPL-SCNC: 26 MMOL/L (ref 22–29)
HCT VFR BLD AUTO: 44.3 % (ref 40–53)
HGB BLD-MCNC: 14.3 G/DL (ref 13.3–17.7)
HOLD SPECIMEN: NORMAL
IMM GRANULOCYTES # BLD: 0 10E3/UL
IMM GRANULOCYTES NFR BLD: 0 %
LYMPHOCYTES # BLD AUTO: 2.1 10E3/UL (ref 0.8–5.3)
LYMPHOCYTES NFR BLD AUTO: 22 %
MCH RBC QN AUTO: 28 PG (ref 26.5–33)
MCHC RBC AUTO-ENTMCNC: 32.3 G/DL (ref 31.5–36.5)
MCV RBC AUTO: 87 FL (ref 78–100)
MONOCYTES # BLD AUTO: 0.7 10E3/UL (ref 0–1.3)
MONOCYTES NFR BLD AUTO: 7 %
NEUTROPHILS # BLD AUTO: 6.3 10E3/UL (ref 1.6–8.3)
NEUTROPHILS NFR BLD AUTO: 65 %
NRBC # BLD AUTO: 0 10E3/UL
NRBC BLD AUTO-RTO: 0 /100
PLATELET # BLD AUTO: 203 10E3/UL (ref 150–450)
POTASSIUM SERPL-SCNC: 4.2 MMOL/L (ref 3.4–5.3)
RBC # BLD AUTO: 5.1 10E6/UL (ref 4.4–5.9)
SODIUM SERPL-SCNC: 140 MMOL/L (ref 135–145)
WBC # BLD AUTO: 9.7 10E3/UL (ref 4–11)

## 2024-09-13 PROCEDURE — 250N000013 HC RX MED GY IP 250 OP 250 PS 637: Performed by: EMERGENCY MEDICINE

## 2024-09-13 PROCEDURE — 85025 COMPLETE CBC W/AUTO DIFF WBC: CPT | Performed by: EMERGENCY MEDICINE

## 2024-09-13 PROCEDURE — 85004 AUTOMATED DIFF WBC COUNT: CPT | Performed by: EMERGENCY MEDICINE

## 2024-09-13 PROCEDURE — 80048 BASIC METABOLIC PNL TOTAL CA: CPT | Performed by: EMERGENCY MEDICINE

## 2024-09-13 PROCEDURE — 71045 X-RAY EXAM CHEST 1 VIEW: CPT

## 2024-09-13 PROCEDURE — 36415 COLL VENOUS BLD VENIPUNCTURE: CPT | Performed by: EMERGENCY MEDICINE

## 2024-09-13 PROCEDURE — 99284 EMERGENCY DEPT VISIT MOD MDM: CPT | Mod: 25

## 2024-09-13 RX ORDER — METOCLOPRAMIDE HYDROCHLORIDE 5 MG/5ML
10 SOLUTION ORAL ONCE
Status: DISCONTINUED | OUTPATIENT
Start: 2024-09-14 | End: 2024-09-14 | Stop reason: HOSPADM

## 2024-09-13 RX ORDER — LEVOTHYROXINE SODIUM 100 UG/1
100 TABLET ORAL ONCE
Status: DISCONTINUED | OUTPATIENT
Start: 2024-09-14 | End: 2024-09-14 | Stop reason: HOSPADM

## 2024-09-13 RX ORDER — AMOXICILLIN AND CLAVULANATE POTASSIUM 600; 42.9 MG/5ML; MG/5ML
875 POWDER, FOR SUSPENSION ORAL 2 TIMES DAILY
Qty: 145.8 ML | Refills: 0 | Status: ON HOLD | OUTPATIENT
Start: 2024-09-13 | End: 2024-09-18

## 2024-09-13 RX ORDER — ACETAMINOPHEN 325 MG/10.15ML
975 LIQUID ORAL ONCE
Status: COMPLETED | OUTPATIENT
Start: 2024-09-13 | End: 2024-09-13

## 2024-09-13 RX ORDER — GLYCOPYRROLATE 1 MG/5ML
1 SOLUTION ORAL EVERY 4 HOURS PRN
Status: DISCONTINUED | OUTPATIENT
Start: 2024-09-13 | End: 2024-09-13

## 2024-09-13 RX ORDER — CARBAMAZEPINE 100 MG/5ML
150 SUSPENSION ORAL ONCE
Status: DISCONTINUED | OUTPATIENT
Start: 2024-09-14 | End: 2024-09-14 | Stop reason: HOSPADM

## 2024-09-13 RX ORDER — AMOXICILLIN AND CLAVULANATE POTASSIUM 400; 57 MG/5ML; MG/5ML
875 POWDER, FOR SUSPENSION ORAL ONCE
Status: COMPLETED | OUTPATIENT
Start: 2024-09-13 | End: 2024-09-14

## 2024-09-13 RX ORDER — CARBAMAZEPINE 100 MG/5ML
150 SUSPENSION ORAL ONCE
Status: COMPLETED | OUTPATIENT
Start: 2024-09-14 | End: 2024-09-14

## 2024-09-13 RX ORDER — GLYCOPYRROLATE 1 MG/1
1 TABLET ORAL EVERY 4 HOURS PRN
Status: DISCONTINUED | OUTPATIENT
Start: 2024-09-13 | End: 2024-09-14 | Stop reason: HOSPADM

## 2024-09-13 RX ADMIN — ACETAMINOPHEN 975 MG: 325 SUSPENSION ORAL at 23:14

## 2024-09-13 ASSESSMENT — ACTIVITIES OF DAILY LIVING (ADL)
ADLS_ACUITY_SCORE: 41

## 2024-09-13 NOTE — TELEPHONE ENCOUNTER
Patient's mom Savannah calling reporting the patient was seen in the ER 9/7 with a fever and has a returning fever or 102.1. Patient had a TIA and is unable to speak or give verbal consent with mom listed as the guardian. Reports he has no other symptoms. Advised to go to  within 4 hours with Savannah to call 911 for transport to the ER with patient wheelchair bound.     Mariana Campos RN 09/13/24 6:34 PM   [facility] Triage Nurse Advisor        Reason for Disposition   [1] Fever > 101 F (38.3 C) AND [2] age > 60 years    Additional Information   Negative: Shock suspected (e.g., cold/pale/clammy skin, too weak to stand, low BP, rapid pulse)   Negative: Difficult to awaken or acting confused (e.g., disoriented, slurred speech)   Negative: [1] Difficulty breathing AND [2] bluish lips, tongue or face   Negative: New-onset rash with multiple purple (or blood-colored) spots or dots   Negative: Sounds like a life-threatening emergency to the triager   Negative: [1] Headache AND [2] stiff neck (can't touch chin to chest)   Negative: Difficulty breathing   Negative: IV Drug Use (IVDU)   Negative: [1] Drinking very little AND [2] dehydration suspected (e.g., no urine > 12 hours, very dry mouth, very lightheaded)   Negative: Widespread rash and cause unknown   Negative: Patient sounds very sick or weak to the triager  (Exception: Mild weakness and hasn't taken fever medicine.)    Protocols used: Fever-A-

## 2024-09-14 VITALS
DIASTOLIC BLOOD PRESSURE: 86 MMHG | OXYGEN SATURATION: 95 % | RESPIRATION RATE: 16 BRPM | TEMPERATURE: 99.5 F | HEART RATE: 84 BPM | SYSTOLIC BLOOD PRESSURE: 125 MMHG

## 2024-09-14 PROCEDURE — 250N000013 HC RX MED GY IP 250 OP 250 PS 637: Performed by: EMERGENCY MEDICINE

## 2024-09-14 RX ADMIN — CARBAMAZEPINE 150 MG: 100 SUSPENSION ORAL at 00:12

## 2024-09-14 RX ADMIN — BRIVARACETAM 100 MG: 10 SOLUTION ORAL at 00:12

## 2024-09-14 RX ADMIN — AMOXICILLIN AND CLAVULANATE POTASSIUM 875 MG: 400; 57 POWDER, FOR SUSPENSION ORAL at 00:12

## 2024-09-14 NOTE — DISCHARGE INSTRUCTIONS
We are suspicious of aspiration and pneumonia we are recommend antibiotics to cover for this.  Please monitor the oxygen saturation if its persistent less than 88% return to the emergency room.

## 2024-09-14 NOTE — ED NOTES
Pt nonverbal, he shakes his head in response to questions. Shakes his head when asked if he he has pain. Is able to follow simple commands, able to move LUE and LLE, but not RUE or RLE. Pt has loose cough, moderate amount of thick yellow secretions suctioned with yankaer from roof of mouth, and small amount of thin clear secretions suctioned from back of throat.

## 2024-09-14 NOTE — ED TRIAGE NOTES
BIBA from home where he lives with his mom. Mom called 911 for patient's fever 101.4. Mom gave patient Tylenol around 1830. Per EMS, , /68, SpO2 90% on RA, GLucose 108.    Pt has history of UTI, TBI, pneumonia. Pt is baseline nonverbal but is able to understand and signal with his hands. Pt has a peg tube.
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
## 2024-09-15 ENCOUNTER — APPOINTMENT (OUTPATIENT)
Dept: CT IMAGING | Facility: CLINIC | Age: 64
DRG: 871 | End: 2024-09-15
Attending: EMERGENCY MEDICINE
Payer: MEDICARE

## 2024-09-15 ENCOUNTER — APPOINTMENT (OUTPATIENT)
Dept: GENERAL RADIOLOGY | Facility: CLINIC | Age: 64
DRG: 871 | End: 2024-09-15
Attending: EMERGENCY MEDICINE
Payer: MEDICARE

## 2024-09-15 ENCOUNTER — HOSPITAL ENCOUNTER (INPATIENT)
Facility: CLINIC | Age: 64
LOS: 3 days | Discharge: HOME | DRG: 871 | End: 2024-09-18
Attending: EMERGENCY MEDICINE | Admitting: HOSPITALIST
Payer: MEDICARE

## 2024-09-15 DIAGNOSIS — J69.0 ASPIRATION PNEUMONIA OF BOTH LUNGS, UNSPECIFIED ASPIRATION PNEUMONIA TYPE, UNSPECIFIED PART OF LUNG (H): Primary | ICD-10-CM

## 2024-09-15 DIAGNOSIS — G40.803 INTRACTABLE EPILEPSY DUE TO EXTERNAL CAUSES WITH STATUS EPILEPTICUS (H): ICD-10-CM

## 2024-09-15 DIAGNOSIS — A41.9 SEPSIS, DUE TO UNSPECIFIED ORGANISM, UNSPECIFIED WHETHER ACUTE ORGAN DYSFUNCTION PRESENT (H): ICD-10-CM

## 2024-09-15 LAB
ALBUMIN SERPL BCG-MCNC: 4 G/DL (ref 3.5–5.2)
ALP SERPL-CCNC: 169 U/L (ref 40–150)
ALT SERPL W P-5'-P-CCNC: 91 U/L (ref 0–70)
ANION GAP SERPL CALCULATED.3IONS-SCNC: 9 MMOL/L (ref 7–15)
AST SERPL W P-5'-P-CCNC: 138 U/L (ref 0–45)
ATRIAL RATE - MUSE: 105 BPM
BASOPHILS # BLD AUTO: 0.1 10E3/UL (ref 0–0.2)
BASOPHILS NFR BLD AUTO: 1 %
BILIRUB DIRECT SERPL-MCNC: <0.2 MG/DL (ref 0–0.3)
BILIRUB SERPL-MCNC: 0.4 MG/DL
BUN SERPL-MCNC: 20.8 MG/DL (ref 8–23)
CALCIUM SERPL-MCNC: 8.7 MG/DL (ref 8.8–10.4)
CHLORIDE SERPL-SCNC: 100 MMOL/L (ref 98–107)
CREAT SERPL-MCNC: 0.85 MG/DL (ref 0.67–1.17)
CREAT SERPL-MCNC: 0.85 MG/DL (ref 0.67–1.17)
DIASTOLIC BLOOD PRESSURE - MUSE: NORMAL MMHG
EGFRCR SERPLBLD CKD-EPI 2021: >90 ML/MIN/1.73M2
EGFRCR SERPLBLD CKD-EPI 2021: >90 ML/MIN/1.73M2
EOSINOPHIL # BLD AUTO: 0.4 10E3/UL (ref 0–0.7)
EOSINOPHIL NFR BLD AUTO: 3 %
ERYTHROCYTE [DISTWIDTH] IN BLOOD BY AUTOMATED COUNT: 16.6 % (ref 10–15)
GLUCOSE SERPL-MCNC: 117 MG/DL (ref 70–99)
HCO3 SERPL-SCNC: 27 MMOL/L (ref 22–29)
HCT VFR BLD AUTO: 44.2 % (ref 40–53)
HGB BLD-MCNC: 14.3 G/DL (ref 13.3–17.7)
HOLD SPECIMEN: NORMAL
HOLD SPECIMEN: NORMAL
IMM GRANULOCYTES # BLD: 0.1 10E3/UL
IMM GRANULOCYTES NFR BLD: 0 %
INTERPRETATION ECG - MUSE: NORMAL
LACTATE SERPL-SCNC: 1.3 MMOL/L (ref 0.7–2)
LYMPHOCYTES # BLD AUTO: 2.6 10E3/UL (ref 0.8–5.3)
LYMPHOCYTES NFR BLD AUTO: 17 %
MCH RBC QN AUTO: 28 PG (ref 26.5–33)
MCHC RBC AUTO-ENTMCNC: 32.4 G/DL (ref 31.5–36.5)
MCV RBC AUTO: 87 FL (ref 78–100)
MONOCYTES # BLD AUTO: 0.9 10E3/UL (ref 0–1.3)
MONOCYTES NFR BLD AUTO: 6 %
NEUTROPHILS # BLD AUTO: 10.8 10E3/UL (ref 1.6–8.3)
NEUTROPHILS NFR BLD AUTO: 73 %
NRBC # BLD AUTO: 0 10E3/UL
NRBC BLD AUTO-RTO: 0 /100
P AXIS - MUSE: 61 DEGREES
PLATELET # BLD AUTO: 182 10E3/UL (ref 150–450)
POTASSIUM SERPL-SCNC: 4.3 MMOL/L (ref 3.4–5.3)
PR INTERVAL - MUSE: 160 MS
PROCALCITONIN SERPL IA-MCNC: 0.1 NG/ML
PROT SERPL-MCNC: 8 G/DL (ref 6.4–8.3)
QRS DURATION - MUSE: 88 MS
QT - MUSE: 344 MS
QTC - MUSE: 454 MS
R AXIS - MUSE: -63 DEGREES
RBC # BLD AUTO: 5.11 10E6/UL (ref 4.4–5.9)
SODIUM SERPL-SCNC: 136 MMOL/L (ref 135–145)
SYSTOLIC BLOOD PRESSURE - MUSE: NORMAL MMHG
T AXIS - MUSE: 62 DEGREES
VENTRICULAR RATE- MUSE: 105 BPM
WBC # BLD AUTO: 14.9 10E3/UL (ref 4–11)

## 2024-09-15 PROCEDURE — 120N000001 HC R&B MED SURG/OB

## 2024-09-15 PROCEDURE — 83605 ASSAY OF LACTIC ACID: CPT | Performed by: EMERGENCY MEDICINE

## 2024-09-15 PROCEDURE — 250N000011 HC RX IP 250 OP 636: Performed by: EMERGENCY MEDICINE

## 2024-09-15 PROCEDURE — 85025 COMPLETE CBC W/AUTO DIFF WBC: CPT | Performed by: EMERGENCY MEDICINE

## 2024-09-15 PROCEDURE — 84145 PROCALCITONIN (PCT): CPT | Performed by: EMERGENCY MEDICINE

## 2024-09-15 PROCEDURE — 93005 ELECTROCARDIOGRAM TRACING: CPT

## 2024-09-15 PROCEDURE — 82248 BILIRUBIN DIRECT: CPT | Performed by: EMERGENCY MEDICINE

## 2024-09-15 PROCEDURE — 250N000009 HC RX 250: Performed by: EMERGENCY MEDICINE

## 2024-09-15 PROCEDURE — 99285 EMERGENCY DEPT VISIT HI MDM: CPT | Mod: 25

## 2024-09-15 PROCEDURE — 36415 COLL VENOUS BLD VENIPUNCTURE: CPT | Performed by: EMERGENCY MEDICINE

## 2024-09-15 PROCEDURE — 258N000003 HC RX IP 258 OP 636: Performed by: EMERGENCY MEDICINE

## 2024-09-15 PROCEDURE — 258N000003 HC RX IP 258 OP 636: Performed by: HOSPITALIST

## 2024-09-15 PROCEDURE — 80048 BASIC METABOLIC PNL TOTAL CA: CPT | Performed by: EMERGENCY MEDICINE

## 2024-09-15 PROCEDURE — 250N000013 HC RX MED GY IP 250 OP 250 PS 637: Performed by: INTERNAL MEDICINE

## 2024-09-15 PROCEDURE — 71045 X-RAY EXAM CHEST 1 VIEW: CPT

## 2024-09-15 PROCEDURE — 96360 HYDRATION IV INFUSION INIT: CPT | Mod: 59

## 2024-09-15 PROCEDURE — 99223 1ST HOSP IP/OBS HIGH 75: CPT | Mod: AI | Performed by: HOSPITALIST

## 2024-09-15 PROCEDURE — 71275 CT ANGIOGRAPHY CHEST: CPT | Mod: MG

## 2024-09-15 PROCEDURE — 87040 BLOOD CULTURE FOR BACTERIA: CPT | Performed by: EMERGENCY MEDICINE

## 2024-09-15 PROCEDURE — 250N000011 HC RX IP 250 OP 636: Performed by: HOSPITALIST

## 2024-09-15 RX ORDER — BISACODYL 10 MG
10 SUPPOSITORY, RECTAL RECTAL DAILY PRN
Status: DISCONTINUED | OUTPATIENT
Start: 2024-09-15 | End: 2024-09-18 | Stop reason: HOSPADM

## 2024-09-15 RX ORDER — IOPAMIDOL 755 MG/ML
74 INJECTION, SOLUTION INTRAVASCULAR ONCE
Status: COMPLETED | OUTPATIENT
Start: 2024-09-15 | End: 2024-09-15

## 2024-09-15 RX ORDER — IBUPROFEN 600 MG/1
600 TABLET, FILM COATED ORAL EVERY 6 HOURS PRN
Status: DISCONTINUED | OUTPATIENT
Start: 2024-09-15 | End: 2024-09-18 | Stop reason: HOSPADM

## 2024-09-15 RX ORDER — ONDANSETRON 4 MG/1
4 TABLET, ORALLY DISINTEGRATING ORAL EVERY 6 HOURS PRN
Status: DISCONTINUED | OUTPATIENT
Start: 2024-09-15 | End: 2024-09-18 | Stop reason: HOSPADM

## 2024-09-15 RX ORDER — PIPERACILLIN SODIUM, TAZOBACTAM SODIUM 3; .375 G/15ML; G/15ML
3.38 INJECTION, POWDER, LYOPHILIZED, FOR SOLUTION INTRAVENOUS EVERY 6 HOURS
Status: COMPLETED | OUTPATIENT
Start: 2024-09-15 | End: 2024-09-17

## 2024-09-15 RX ORDER — NALOXONE HYDROCHLORIDE 0.4 MG/ML
0.2 INJECTION, SOLUTION INTRAMUSCULAR; INTRAVENOUS; SUBCUTANEOUS
Status: DISCONTINUED | OUTPATIENT
Start: 2024-09-15 | End: 2024-09-18 | Stop reason: HOSPADM

## 2024-09-15 RX ORDER — AMOXICILLIN 250 MG
2 CAPSULE ORAL 2 TIMES DAILY PRN
Status: DISCONTINUED | OUTPATIENT
Start: 2024-09-15 | End: 2024-09-16

## 2024-09-15 RX ORDER — NALOXONE HYDROCHLORIDE 0.4 MG/ML
0.4 INJECTION, SOLUTION INTRAMUSCULAR; INTRAVENOUS; SUBCUTANEOUS
Status: DISCONTINUED | OUTPATIENT
Start: 2024-09-15 | End: 2024-09-18 | Stop reason: HOSPADM

## 2024-09-15 RX ORDER — ONDANSETRON 2 MG/ML
4 INJECTION INTRAMUSCULAR; INTRAVENOUS EVERY 6 HOURS PRN
Status: DISCONTINUED | OUTPATIENT
Start: 2024-09-15 | End: 2024-09-18 | Stop reason: HOSPADM

## 2024-09-15 RX ORDER — ENOXAPARIN SODIUM 100 MG/ML
40 INJECTION SUBCUTANEOUS EVERY 24 HOURS
Status: DISCONTINUED | OUTPATIENT
Start: 2024-09-15 | End: 2024-09-18 | Stop reason: HOSPADM

## 2024-09-15 RX ORDER — HYDROMORPHONE HCL IN WATER/PF 6 MG/30 ML
0.4 PATIENT CONTROLLED ANALGESIA SYRINGE INTRAVENOUS
Status: DISCONTINUED | OUTPATIENT
Start: 2024-09-15 | End: 2024-09-18 | Stop reason: HOSPADM

## 2024-09-15 RX ORDER — CARBAMAZEPINE 100 MG/5ML
150 SUSPENSION ORAL 3 TIMES DAILY
Status: DISCONTINUED | OUTPATIENT
Start: 2024-09-16 | End: 2024-09-16

## 2024-09-15 RX ORDER — HYDROMORPHONE HCL IN WATER/PF 6 MG/30 ML
0.2 PATIENT CONTROLLED ANALGESIA SYRINGE INTRAVENOUS
Status: DISCONTINUED | OUTPATIENT
Start: 2024-09-15 | End: 2024-09-18 | Stop reason: HOSPADM

## 2024-09-15 RX ORDER — SODIUM CHLORIDE 9 MG/ML
INJECTION, SOLUTION INTRAVENOUS CONTINUOUS
Status: DISCONTINUED | OUTPATIENT
Start: 2024-09-15 | End: 2024-09-16

## 2024-09-15 RX ORDER — AMOXICILLIN 250 MG
1 CAPSULE ORAL 2 TIMES DAILY PRN
Status: DISCONTINUED | OUTPATIENT
Start: 2024-09-15 | End: 2024-09-16

## 2024-09-15 RX ORDER — CARBAMAZEPINE 100 MG/5ML
100 SUSPENSION ORAL DAILY
Status: DISCONTINUED | OUTPATIENT
Start: 2024-09-15 | End: 2024-09-18 | Stop reason: HOSPADM

## 2024-09-15 RX ORDER — PIPERACILLIN SODIUM, TAZOBACTAM SODIUM 4; .5 G/20ML; G/20ML
4.5 INJECTION, POWDER, LYOPHILIZED, FOR SOLUTION INTRAVENOUS ONCE
Status: COMPLETED | OUTPATIENT
Start: 2024-09-15 | End: 2024-09-15

## 2024-09-15 RX ORDER — LIDOCAINE 40 MG/G
CREAM TOPICAL
Status: DISCONTINUED | OUTPATIENT
Start: 2024-09-15 | End: 2024-09-18 | Stop reason: HOSPADM

## 2024-09-15 RX ORDER — CALCIUM CARBONATE 500 MG/1
1000 TABLET, CHEWABLE ORAL 4 TIMES DAILY PRN
Status: DISCONTINUED | OUTPATIENT
Start: 2024-09-15 | End: 2024-09-16

## 2024-09-15 RX ADMIN — SODIUM CHLORIDE 85 ML: 9 INJECTION, SOLUTION INTRAVENOUS at 04:05

## 2024-09-15 RX ADMIN — CARBAMAZEPINE 100 MG: 100 SUSPENSION ORAL at 21:30

## 2024-09-15 RX ADMIN — BRIVARACETAM 100 MG: 10 SOLUTION ORAL at 21:29

## 2024-09-15 RX ADMIN — HYDROCORTISONE SODIUM SUCCINATE 100 MG: 100 INJECTION, POWDER, FOR SOLUTION INTRAMUSCULAR; INTRAVENOUS at 05:33

## 2024-09-15 RX ADMIN — SODIUM CHLORIDE, PRESERVATIVE FREE: 5 INJECTION INTRAVENOUS at 13:04

## 2024-09-15 RX ADMIN — SODIUM CHLORIDE, POTASSIUM CHLORIDE, SODIUM LACTATE AND CALCIUM CHLORIDE 1000 ML: 600; 310; 30; 20 INJECTION, SOLUTION INTRAVENOUS at 03:38

## 2024-09-15 RX ADMIN — HYDROCORTISONE SODIUM SUCCINATE 25 MG: 100 INJECTION, POWDER, FOR SOLUTION INTRAMUSCULAR; INTRAVENOUS at 18:42

## 2024-09-15 RX ADMIN — PIPERACILLIN AND TAZOBACTAM 3.38 G: 3; .375 INJECTION, POWDER, FOR SOLUTION INTRAVENOUS at 18:42

## 2024-09-15 RX ADMIN — HYDROCORTISONE SODIUM SUCCINATE 25 MG: 100 INJECTION, POWDER, FOR SOLUTION INTRAMUSCULAR; INTRAVENOUS at 13:11

## 2024-09-15 RX ADMIN — PIPERACILLIN AND TAZOBACTAM 3.38 G: 3; .375 INJECTION, POWDER, FOR SOLUTION INTRAVENOUS at 13:11

## 2024-09-15 RX ADMIN — ENOXAPARIN SODIUM 40 MG: 40 INJECTION SUBCUTANEOUS at 13:11

## 2024-09-15 RX ADMIN — IOPAMIDOL 74 ML: 755 INJECTION, SOLUTION INTRAVENOUS at 04:05

## 2024-09-15 RX ADMIN — PIPERACILLIN AND TAZOBACTAM 4.5 G: 4; .5 INJECTION, POWDER, FOR SOLUTION INTRAVENOUS at 05:36

## 2024-09-15 ASSESSMENT — ACTIVITIES OF DAILY LIVING (ADL)
ADLS_ACUITY_SCORE: 61
ADLS_ACUITY_SCORE: 61
ADLS_ACUITY_SCORE: 41
ADLS_ACUITY_SCORE: 41
ADLS_ACUITY_SCORE: 49
ADLS_ACUITY_SCORE: 41
ADLS_ACUITY_SCORE: 41
ADLS_ACUITY_SCORE: 61
ADLS_ACUITY_SCORE: 61
ADLS_ACUITY_SCORE: 41
ADLS_ACUITY_SCORE: 61
ADLS_ACUITY_SCORE: 49
ADLS_ACUITY_SCORE: 41
ADLS_ACUITY_SCORE: 41
ADLS_ACUITY_SCORE: 49
ADLS_ACUITY_SCORE: 41
ADLS_ACUITY_SCORE: 41
ADLS_ACUITY_SCORE: 61
ADLS_ACUITY_SCORE: 41
ADLS_ACUITY_SCORE: 41

## 2024-09-15 NOTE — PHARMACY-ADMISSION MEDICATION HISTORY
Pharmacy Intern Admission Medication History    Admission medication history is complete. The information provided in this note is only as accurate as the sources available at the time of the update.    Information Source(s): Family member and CareEverywhere/SureScripts via phone    Pertinent Information:   Spoke with legal guardian. She said that she only gave his prescription medications yesterday because she was worried and did not want to upset his stomach.  He was prescribed Augmentin 2 days ago, 9/13, and was given 4 doses at home prior to coming here today, 9/15.   Legal guardian mentioned he is supposed to receive his weekly testosterone shot today, 9/15, and she can bring it here if necessary.     Changes made to PTA medication list:  Added: None  Deleted: None  Changed: None    Allergies reviewed with patient and updates made in EHR: yes    Medication History Completed By: Christa Barber 9/15/2024 9:23 AM    PTA Med List   Medication Sig Last Dose    acetaminophen (TYLENOL) 325 MG tablet Take 650 mg by mouth every 6 hours as needed for mild pain 9/14/2024    acetylcysteine (MUCOMYST) 20 % neb solution Take 2 mLs by nebulization 4 times daily (To use along with albuterol nebs) Past Week    albuterol (PROVENTIL) (5 MG/ML) 0.5% neb solution Take 0.5 mLs (2.5 mg) by nebulization every 6 hours as needed for shortness of breath, wheezing or cough 0700 1100 1500 1900 with mucomyst Unknown at PRN    amoxicillin-clavulanate (AUGMENTIN ES-600) 600-42.9 MG/5ML suspension Place 7.29 mLs (875 mg) into Feeding Tube 2 times daily for 10 days. 9/14/2024    bacitracin 500 UNIT/GM external ointment Apply topically daily as needed for wound care To PEG site. Unknown at PRN    Brivaracetam (BRIVIACT) 10 MG/ML solution Take 100 mg by mouth or Feeding Tube 2 times daily 0900, 2100 9/14/2024    carBAMazepine (TEGRETOL) 100 MG/5ML suspension TAKE 7.5ML BY MOUTH OR FEEDING TUBE 3 TIMES DAILY AT 6:00, 12:00, AND 24:00 FOR SEIZURES  9/14/2024    carBAMazepine (TEGRETOL) 100 MG/5ML suspension Place 100 mg into Feeding Tube daily Take at 1800 9/14/2024    COMPOUNDED NON-CONTROLLED SUBSTANCE (CMPD RX) - PHARMACY TO MIX COMPOUNDED MEDICATION Scopolamine 0.4mg capsules - take  2 capsule by feeding tube three times daily as needed Unknown at PRN    Cyanocobalamin (VITAMIN B-12 PO) 1,000 mcg by Per Feeding Tube route daily Past Week    glycopyrrolate (ROBINUL) 1 MG tablet Take 2 tablets (2 mg) by mouth 2 times daily. TAKE 1 TABLET(1 MG) BY MOUTH TWICE DAILY AS NEEDED FOR SECRETIONS 9/14/2024    guaiFENesin (MUCINEX) 600 MG 12 hr tablet Take 1 tablet (600 mg) by mouth 2 times daily as needed for congestion Unknown at PRN    hydrocortisone (CORTAID) 1 % external cream Apply topically 2 times daily as needed Apply to reddened memo areas as needed Unknown at PRN    hydrocortisone (CORTEF) 5 MG tablet Take 15 mg (3 tablets) in the morning and 7.5 mg (1.5 tablet)  at 2:00 PM. During illness patient takes more as a stress dose. Please increase the dose as directed. 9/14/2024    levothyroxine (SYNTHROID/LEVOTHROID) 100 MCG tablet Take 1 tablet (100 mcg) by mouth daily 9/14/2024    metoclopramide (REGLAN) 10 MG/10ML SOLN solution TAKE 10 ML BY MOUTH FOUR TIMES DAILY(BEFORE MEALS AND NIGHTLY) AT 8 AM, 12 PM, 4 PM, AND 8 PM. 9/14/2024    miconazole (MICATIN) 2 % external powder Apply topically 2 times daily as needed Unknown at PRN    multivitamin, therapeutic (THERA-VIT) TABS tablet 1 tablet by Per Feeding Tube route daily Past Week    mupirocin (BACTROBAN) 2 % external ointment APPLY TOPICALLY TO THE AFFECTED AREA TWICE DAILY AS NEEDED Unknown at PRN    Nutritional Supplements (BOOST HIGH PROTEIN) LIQD  Past Week    pantoprazole (PROTONIX) 2 mg/mL SUSP suspension Place 20 mLs (40 mg) into Feeding Tube 2 times daily Past Week    polyethylene glycol (MIRALAX) 17 GM/Dose powder Take 17 g by mouth daily. 9/14/2024    potassium & sodium phosphates (NEUTRA-PHOS)  280-160-250 MG Packet Take 1 packet by mouth daily 9/14/2024    sennosides (SENOKOT) 8.6 MG tablet Take 1 tablet by mouth 2 times daily as needed for constipation Unknown at PRN    sodium chloride 1 GM tablet Place 1 tablet (1 g) into Feeding Tube 2 times daily 9/14/2024    testosterone cypionate (DEPOTESTOSTERONE) 200 MG/ML injection INJECT 0.25ML IN THE MUSCLE ONCE A WEEK. Please schedule follow up for further refills. Past Week at 9/8    vitamin C (ASCORBIC ACID) 1000 MG TABS 3,000 mg by Oral or Feeding Tube route daily Past Week    vitamin D3 (CHOLECALCIFEROL) 2000 units (50 mcg) tablet 4,000 Units by Per Feeding Tube route daily Past Week

## 2024-09-15 NOTE — PLAN OF CARE
Goal Outcome Evaluation:       Reason for Admission: aspirational pneumonia    Cognitive/Mentation: A/Ox HECTOR non-verbal  Neuros/CMS: R sided hemiplegia, non-verbal,  VS: VSS on 10L oxy mask.   /GI: incontinent.   Pulmonary: pt on 10 L oxy mask lung sounds diminished.  Pain: no non-verbal signs of pain rflacc 0.     Drains/Lines: R PIV intact patent and infusing NS 100ml/hr  Skin: R arm abrasion, R knee scab, blanchable redness to sacrum coccyx meplex placed. PEG tube site cleanses and new split gauze placed.  Two person skin check performed with charge nurse  Activity: Assist x two with lift.  Diet: NPO tubefeed. tubefeed held per MD potential restart tomorrow .     Therapies recs: pending  Discharge: pending    Aggression Stoplight Tool: green

## 2024-09-15 NOTE — ED NOTES
Bed: ED29  Expected date:   Expected time:   Means of arrival:   Comments:  Kerri  1 64M suspected pneumonia, eta 0132

## 2024-09-15 NOTE — ED TRIAGE NOTES
Patient  arrived via ems . He is here  with a fever of 100.1 F at home. He also was here recently with  a cough  and was put on antibiotic. Patient is non verbal  and non ambulatory . He lives with his mother who is his care taker. Per ems he was given 2 tylenol      Triage Assessment (Adult)       Row Name 09/15/24 0143          Triage Assessment    Airway WDL WDL        Respiratory WDL    Respiratory WDL WDL        Skin Circulation/Temperature WDL    Skin Circulation/Temperature WDL WDL        Cardiac WDL    Cardiac WDL WDL        Peripheral/Neurovascular WDL    Peripheral Neurovascular WDL WDL        Cognitive/Neuro/Behavioral WDL    Cognitive/Neuro/Behavioral WDL WDL
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
## 2024-09-15 NOTE — ED NOTES
[facility]  ED Nurse Handoff Report    ED Chief complaint: Fever      ED Diagnosis:   Final diagnoses:   None       Code Status:   8/23/2024 11:36 PM 8/31/2024  6:20 PM Full Code       Allergies:   Allergies   Allergen Reactions    Valproic Acid Other (See Comments)     Toxicity w/ bone marrow suspension, elevated ammonia levels    Toxicity with bone marrow suspension   Elevated ammonia levels    Poisens system    Scopolamine Hives     Hives with the patch - oral no problem    Vancomycin Other (See Comments)     Vancomycin flushing syndrome - pt tolerated dose at half rate.    Phenytoin Sodium        Patient Story: Patient arrived via ems . He is here with a fever of 100.1 F at home. He also was here recently with a cough and was put on antibiotic. Patient is non verbal and non ambulatory . He lives with his mother who is his care taker. Per ems he was given 2 tylenol   Focused Assessment:    Labs Ordered and Resulted from Time of ED Arrival to Time of ED Departure   BASIC METABOLIC PANEL - Abnormal       Result Value    Sodium 136      Potassium 4.3      Chloride 100      Carbon Dioxide (CO2) 27      Anion Gap 9      Urea Nitrogen 20.8      Creatinine 0.85      GFR Estimate >90      Calcium 8.7 (*)     Glucose 117 (*)    CBC WITH PLATELETS AND DIFFERENTIAL - Abnormal    WBC Count 14.9 (*)     RBC Count 5.11      Hemoglobin 14.3      Hematocrit 44.2      MCV 87      MCH 28.0      MCHC 32.4      RDW 16.6 (*)     Platelet Count 182      % Neutrophils 73      % Lymphocytes 17      % Monocytes 6      % Eosinophils 3      % Basophils 1      % Immature Granulocytes 0      NRBCs per 100 WBC 0      Absolute Neutrophils 10.8 (*)     Absolute Lymphocytes 2.6      Absolute Monocytes 0.9      Absolute Eosinophils 0.4      Absolute Basophils 0.1      Absolute Immature Granulocytes 0.1      Absolute NRBCs 0.0     HEPATIC FUNCTION PANEL - Abnormal    Protein Total 8.0      Albumin 4.0      Bilirubin Total 0.4       Alkaline Phosphatase 169 (*)      (*)     ALT 91 (*)     Bilirubin Direct <0.20     LACTIC ACID WHOLE BLOOD - Normal    Lactic Acid 1.3     PROCALCITONIN - Normal    Procalcitonin 0.10     BLOOD CULTURE     CT Chest PE Abdomen Pelvis w Contrast   Final Result   IMPRESSION:   1.  Aspiration pneumonitis in the lower lobes, right more than left.   2.  Rectum moderately distended with stool.   3.  Colonic diverticulosis.   4.  Stable 3 cm cystic lesion of the pancreatic tail warrants nonemergent further evaluation by MR with gadolinium.      XR Chest Port 1 View   Final Result   IMPRESSION: Stable elevated right hemidiaphragm. Mild bibasilar atelectasis and/or scarring. No focal pulmonary consolidation otherwise, or significant pleural effusion. Normal heart size without pulmonary vascular congestion. No pneumothorax. No acute    osseous abnormality.        Medications   piperacillin-tazobactam (ZOSYN) 4.5 g vial to attach to  mL bag (has no administration in time range)   lactated ringers BOLUS 1,000 mL (0 mLs Intravenous Stopped 9/15/24 0430)   iopamidol (ISOVUE-370) solution 74 mL (74 mLs Intravenous $Given 9/15/24 0405)   Saline Flush (85 mLs Intravenous $Given 9/15/24 0405)             Does this patient have any cognitive concerns?: TBI, nonverbal WC bound and total care    Activity level - Baseline/Home:  Total Care  Activity Level - Current:   Total Care    Patient's Preferred language: English   Needed?: No    Isolation: Contact   Infection: MRSA  VRE      Vital Signs:   Vitals:    09/15/24 0136   BP: 127/73   Pulse: 112   Resp: 17   Temp: 99.8  F (37.7  C)   TempSrc: Rectal   SpO2: 93%       Cardiac Rhythm:     Was the PSS-3 completed:   Yes  What interventions are required if any?               Family Comments: NONE      For the majority of the shift this patient's behavior was Green.   Behavioral interventions performed were NONE.    ED NURSE PHONE NUMBER: [id number]

## 2024-09-15 NOTE — ED PROVIDER NOTES
Emergency Department Note      History of Present Illness     Chief Complaint   Fever      HPI   Keyon Farias is a 64 year old male with history of aphasia due to TBI and history of DVT presents to the emergency department with ongoing temperature with Tmax of 100.1  F at home after recent diagnosis of suspected aspiration pneumonia.  Patient is nonverbal and nonambulatory at baseline and lives at home with mother who is his caretaker.  On chart review, patient was recently placed on Augmentin.  Patient has been having an ongoing cough.  Patient shakes head for pain or shortness of breath at this time.    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)      DVT of upper extremity (deep vein thrombosis) (H)      Gastro-oesophageal reflux disease      Panhypopituitarism (H24)      Pneumonia      Seizures (H)      Sepsis due to urinary tract infection (H) 01/15/2021     Septic shock (H)      Spastic hemiplegia affecting dominant side (H)      Thyroid disease      Tracheostomy care (H)      Traumatic brain injury (H) 1989     Unspecified cerebral artery occlusion with cerebral infarction 1989     UTI (urinary tract infection)      Ventricular fibrillation (H)      Ventricular tachyarrhythmia (H)        Medications   acetaminophen (TYLENOL) 325 MG tablet  acetylcysteine (MUCOMYST) 20 % neb solution  albuterol (PROVENTIL) (5 MG/ML) 0.5% neb solution  amoxicillin-clavulanate (AUGMENTIN ES-600) 600-42.9 MG/5ML suspension  bacitracin 500 UNIT/GM external ointment  Brivaracetam (BRIVIACT) 10 MG/ML solution  carBAMazepine (TEGRETOL) 100 MG/5ML suspension  carBAMazepine (TEGRETOL) 100 MG/5ML suspension  COMPOUNDED NON-CONTROLLED SUBSTANCE (CMPD RX) - PHARMACY TO MIX COMPOUNDED  "MEDICATION  Cyanocobalamin (VITAMIN B-12 PO)  glycopyrrolate (ROBINUL) 1 MG tablet  guaiFENesin (MUCINEX) 600 MG 12 hr tablet  hydrocortisone (CORTAID) 1 % external cream  hydrocortisone (CORTEF) 5 MG tablet  insulin syringe-needle U-100 (29G X 1/2\" 1 ML) 29G X 1/2\" 1 ML miscellaneous  levothyroxine (SYNTHROID/LEVOTHROID) 100 MCG tablet  metoclopramide (REGLAN) 10 MG/10ML SOLN solution  miconazole (MICATIN) 2 % external powder  multivitamin, therapeutic (THERA-VIT) TABS tablet  mupirocin (BACTROBAN) 2 % external ointment  Nutritional Supplements (BOOST HIGH PROTEIN) LIQD  pantoprazole (PROTONIX) 2 mg/mL SUSP suspension  polyethylene glycol (MIRALAX) 17 GM/Dose powder  potassium & sodium phosphates (NEUTRA-PHOS) 280-160-250 MG Packet  sennosides (SENOKOT) 8.6 MG tablet  sodium chloride 1 GM tablet  testosterone cypionate (DEPOTESTOSTERONE) 200 MG/ML injection  vitamin C (ASCORBIC ACID) 1000 MG TABS  vitamin D3 (CHOLECALCIFEROL) 2000 units (50 mcg) tablet        Surgical History   Past Surgical History:   Procedure Laterality Date     ENDOSCOPIC ULTRASOUND UPPER GASTROINTESTINAL TRACT (GI) N/A 1/30/2017    Procedure: ENDOSCOPIC ULTRASOUND, ESOPHAGOSCOPY / UPPER GASTROINTESTINAL TRACT (GI);  Surgeon: Jus Montana MD;  Location: [facility] OR     ENDOSCOPIC ULTRASOUND, ESOPHAGOSCOPY, GASTROSCOPY, DUODENOSCOPY (EGD), NECROSECTOMY N/A 2/7/2017    Procedure: ENDOSCOPIC ULTRASOUND, ESOPHAGOSCOPY, GASTROSCOPY, DUODENOSCOPY (EGD), NECROSECTOMY;  Surgeon: Jack Marcus MD;  Location: [facility] OR     ESOPHAGOSCOPY, GASTROSCOPY, DUODENOSCOPY (EGD), COMBINED  3/13/2014    Procedure: COMBINED ESOPHAGOSCOPY, GASTROSCOPY, DUODENOSCOPY (EGD), BIOPSY SINGLE OR MULTIPLE;  gastroscopy;  Surgeon: Digna Rhodes MD;  Location:  GI     ESOPHAGOSCOPY, GASTROSCOPY, DUODENOSCOPY (EGD), COMBINED N/A 12/6/2016    Procedure: COMBINED ESOPHAGOSCOPY, GASTROSCOPY, DUODENOSCOPY (EGD);  Surgeon: Digna Rhodes MD;  " Location:  GI     ESOPHAGOSCOPY, GASTROSCOPY, DUODENOSCOPY (EGD), COMBINED N/A 2/7/2017    Procedure: COMBINED ENDOSCOPIC ULTRASOUND, ESOPHAGOSCOPY, GASTROSCOPY, DUODENOSCOPY (EGD), FINE NEEDLE ASPIRATE/BIOPSY;  Surgeon: Too Thakur MD;  Location: [facility] OR     ESOPHAGOSCOPY, GASTROSCOPY, DUODENOSCOPY (EGD), COMBINED N/A 7/3/2024    Procedure: Endoscopic ultrasound upper gastrointestinal tract (GI);  Surgeon: Digna Rhodes MD;  Location:  GI     HEAD & NECK SURGERY      reconstructive facial surgery following accident in 1989     IR FOLLOW UP VISIT INPATIENT  2/20/2019     IR GASTRO JEJUNOSTOMY TUBE CHANGE  12/20/2018     IR GASTRO JEJUNOSTOMY TUBE CHANGE  2/4/2019     IR GASTRO JEJUNOSTOMY TUBE CHANGE  3/8/2019     IR GASTRO JEJUNOSTOMY TUBE CHANGE  8/7/2019     IR GASTRO JEJUNOSTOMY TUBE CHANGE  1/13/2020     IR GASTRO JEJUNOSTOMY TUBE CHANGE  1/30/2020     IR GASTRO JEJUNOSTOMY TUBE CHANGE  6/24/2020     IR GASTRO JEJUNOSTOMY TUBE CHANGE  9/17/2020     IR GASTRO JEJUNOSTOMY TUBE CHANGE  10/14/2020     IR GASTRO JEJUNOSTOMY TUBE CHANGE  2/16/2021     IR GASTRO JEJUNOSTOMY TUBE CHANGE  5/6/2021     IR GASTRO JEJUNOSTOMY TUBE CHANGE  5/25/2021     IR GASTRO JEJUNOSTOMY TUBE CHANGE  7/26/2021     IR GASTRO JEJUNOSTOMY TUBE CHANGE  9/29/2021     IR GASTRO JEJUNOSTOMY TUBE CHANGE  11/16/2021     IR GASTRO JEJUNOSTOMY TUBE CHANGE  3/18/2022     IR GASTRO JEJUNOSTOMY TUBE CHANGE  6/8/2022     IR GASTRO JEJUNOSTOMY TUBE CHANGE  7/1/2022     IR GASTRO JEJUNOSTOMY TUBE CHANGE  11/25/2022     IR GASTRO JEJUNOSTOMY TUBE CHANGE  5/1/2023     IR GASTRO JEJUNOSTOMY TUBE CHANGE  8/10/2023     IR GASTRO JEJUNOSTOMY TUBE CHANGE  9/21/2023     IR GASTRO JEJUNOSTOMY TUBE CHANGE  11/7/2023     IR GASTRO JEJUNOSTOMY TUBE CHANGE  5/1/2024     IR GASTRO JEJUNOSTOMY TUBE CHANGE  8/5/2024     IR PICC EXCHANGE LEFT  8/15/2019     LAPAROSCOPIC APPENDECTOMY  7/30/2013    Procedure: LAPAROSCOPIC APPENDECTOMY;   LAPAROSCOPIC APPENDECTOMY;  Surgeon: Manish Pierce MD;  Location:  OR     LAPAROSCOPIC ASSISTED INSERTION TUBE GASTROTOMY N/A 9/7/2016    Procedure: LAPAROSCOPIC ASSISTED INSERTION TUBE GASTROSTOMY;  Surgeon: Manish Pierce MD;  Location:  OR     ORTHOPEDIC SURGERY      right hand repair     TRACHEOSTOMY N/A 9/3/2016    Procedure: TRACHEOSTOMY;  Surgeon: João Ortiz MD;  Location:  OR     TRACHEOSTOMY N/A 12/2/2016    Procedure: TRACHEOSTOMY;  Surgeon: João Ortiz MD;  Location:  OR     VASCULAR SURGERY         Physical Exam     Patient Vitals for the past 24 hrs:   BP Temp Temp src Pulse Resp SpO2   09/15/24 0600 107/60 -- -- 83 (!) 8 92 %   09/15/24 0400 117/81 -- -- 99 25 92 %   09/15/24 0300 126/86 -- -- 105 24 94 %   09/15/24 0200 105/69 -- -- 105 -- 93 %   09/15/24 0136 127/73 99.8  F (37.7  C) Rectal 112 17 93 %     Physical Exam  Constitutional:       Appearance: Normal appearance.   HENT:      Head: Normocephalic and atraumatic.   Eyes:      Extraocular Movements: Extraocular movements intact.      Conjunctiva/sclera: Conjunctivae normal.   Cardiovascular:      Rate and Rhythm: Normal rate and regular rhythm.   Pulmonary:      Effort: Pulmonary effort is normal. No respiratory distress.      Breath sounds: Coarse breath sounds bilaterally.  Audible crackles.  Abdominal:      General: Abdomen is flat. There is no distension.      Palpations: Abdomen is soft.      Tenderness: There is no abdominal tenderness.   Musculoskeletal:      Cervical back: Normal range of motion. No rigidity.       Right lower leg: No edema.      Left lower leg: No edema.   Skin:     General: Skin is warm and dry.   Neurological:      General: Bedbound at baseline.     Mental Status: Nonverbal at baseline and shakes head to basic questioning.   Psychiatric:         Mood and Affect: Mood normal.         Behavior: Behavior normal.    Diagnostics     Lab Results   Labs Ordered and Resulted from  Time of ED Arrival to Time of ED Departure   BASIC METABOLIC PANEL - Abnormal       Result Value    Sodium 136      Potassium 4.3      Chloride 100      Carbon Dioxide (CO2) 27      Anion Gap 9      Urea Nitrogen 20.8      Creatinine 0.85      GFR Estimate >90      Calcium 8.7 (*)     Glucose 117 (*)    CBC WITH PLATELETS AND DIFFERENTIAL - Abnormal    WBC Count 14.9 (*)     RBC Count 5.11      Hemoglobin 14.3      Hematocrit 44.2      MCV 87      MCH 28.0      MCHC 32.4      RDW 16.6 (*)     Platelet Count 182      % Neutrophils 73      % Lymphocytes 17      % Monocytes 6      % Eosinophils 3      % Basophils 1      % Immature Granulocytes 0      NRBCs per 100 WBC 0      Absolute Neutrophils 10.8 (*)     Absolute Lymphocytes 2.6      Absolute Monocytes 0.9      Absolute Eosinophils 0.4      Absolute Basophils 0.1      Absolute Immature Granulocytes 0.1      Absolute NRBCs 0.0     HEPATIC FUNCTION PANEL - Abnormal    Protein Total 8.0      Albumin 4.0      Bilirubin Total 0.4      Alkaline Phosphatase 169 (*)      (*)     ALT 91 (*)     Bilirubin Direct <0.20     LACTIC ACID WHOLE BLOOD - Normal    Lactic Acid 1.3     PROCALCITONIN - Normal    Procalcitonin 0.10     BLOOD CULTURE       Imaging   CT Chest PE Abdomen Pelvis w Contrast   Final Result   IMPRESSION:   1.  Aspiration pneumonitis in the lower lobes, right more than left.   2.  Rectum moderately distended with stool.   3.  Colonic diverticulosis.   4.  Stable 3 cm cystic lesion of the pancreatic tail warrants nonemergent further evaluation by MR with gadolinium.      XR Chest Port 1 View   Final Result   IMPRESSION: Stable elevated right hemidiaphragm. Mild bibasilar atelectasis and/or scarring. No focal pulmonary consolidation otherwise, or significant pleural effusion. Normal heart size without pulmonary vascular congestion. No pneumothorax. No acute    osseous abnormality.          EKG   ECG results from 09/15/24   EKG 12 lead     Value     Systolic Blood Pressure     Diastolic Blood Pressure     Ventricular Rate 105    Atrial Rate 105    WV Interval 160    QRS Duration 88        QTc 454    P Axis 61    R AXIS -63    T Axis 62    Interpretation ECG      Sinus tachycardia  Left anterior fascicular block  Abnormal ECG  When compared with ECG of 23-Jul-2024 17:56,  Borderline criteria for Anteroseptal infarct are no longer Present  Confirmed by GENERATED REPORT, COMPUTER ([id number]),  Yoel Molina ([id number]) on 9/15/2024 2:52:49 AM       *Note: Due to a large number of results and/or encounters for the requested time period, some results have not been displayed. A complete set of results can be found in Results Review.     See ED course for independent interpretation of EKG.    Independent Interpretation   On my independent interpretation of chest x-ray, there is no obvious new focal opacity, mediastinal widening, or pneumothorax.    ED Course      Medications Administered   Medications   lactated ringers BOLUS 1,000 mL ( Intravenous Restarted 9/15/24 0526)   iopamidol (ISOVUE-370) solution 74 mL (74 mLs Intravenous $Given 9/15/24 0405)   Saline Flush (85 mLs Intravenous $Given 9/15/24 0405)   piperacillin-tazobactam (ZOSYN) 4.5 g vial to attach to  mL bag (0 g Intravenous Stopped 9/15/24 0543)   hydrocortisone sodium succinate PF (solu-CORTEF) injection 100 mg (100 mg Intravenous $Given 9/15/24 0533)       Procedures   Procedures     Discussion of Management   See ED course    ED Course   ED Course as of 09/15/24 0734   Sun Sep 15, 2024   0251 EKG 12 lead  Sinus tachycardia.  Rate of 105.  Normal WV and QRS.  Normal QTc.  No acute ST elevation or depression as compared with 7/23/2024 EKG.   0331 New transaminitis   0507 Discussed patient with hospitalist who accepted the patient for admission.  He request on 100 mg of IV hydrocortisone given history of adrenal insufficiency and patient tachycardic.       Additional  Documentation  None    Medical Decision Making / Diagnosis     CMS Diagnoses: None    MIPS   CT for PE was ordered because the patient is high risk for pulmonary embolism.    MDM   Keyon Farias is a 64 year old male as described above presents to the emergency department for ongoing borderline temperatures with cough and recent diagnosis of suspected aspiration pneumonia.  Patient hemodynamically stable at time of evaluation.  Afebrile, but received Tylenol outpatient.  New leukocytosis of 14.9 as compared with 2 days prior.  Lactic acid 1.3.  Initial chest x-ray ordered from triage unremarkable for acute changes.  CT PE chest with abdomen and pelvis ordered for evaluation for underlying progression of pneumonia/aspiration, colitis, and other infectious sources.  Blood cultures.  Additional workup and orders as listed in chart.    Ultimately, work up shows bilateral aspiration pneumonitis with new leukocytosis and persistent tachycardia.  New transaminitis.  Stable cystic lesion at the pancreatic tail.  Ultimately, most likely infectious source/sepsis secondary to recurring aspiration pneumonia.  Blood cultures and IV Zosyn ordered.  Patient admitted to the hospital service and given a dose of stress dose steroid given history of adrenal insufficiency.    Please refer to ED course above as part of continuation of MDM for details on the patient's treatment course and any potential changes or updates beyond my initial evaluation and MDM creation.      Disposition   The patient was admitted to the hospital.     Diagnosis     ICD-10-CM    1. Aspiration pneumonia of both lungs, unspecified aspiration pneumonia type, unspecified part of lung (H)  J69.0       2. Sepsis, due to unspecified organism, unspecified whether acute organ dysfunction present (H)  A41.9            Discharge Medications   New Prescriptions    No medications on file         DO Adrianna QUEVEDO Ferris, DO  09/15/24 0797

## 2024-09-15 NOTE — ED NOTES
[facility]  ED Nurse Handoff Report    ED Chief complaint: Fever      ED Diagnosis:   Final diagnoses:   Aspiration pneumonia of both lungs, unspecified aspiration pneumonia type, unspecified part of lung (H)   Sepsis, due to unspecified organism, unspecified whether acute organ dysfunction present (H)       Code Status: Full Code    Allergies:   Allergies   Allergen Reactions    Valproic Acid Other (See Comments)     Toxicity w/ bone marrow suspension, elevated ammonia levels    Toxicity with bone marrow suspension   Elevated ammonia levels    Poisens system    Scopolamine Hives     Hives with the patch - oral no problem    Vancomycin Other (See Comments)     Vancomycin flushing syndrome - pt tolerated dose at half rate.    Phenytoin Sodium        Patient Story: Pt arrives from home where he is taken care of by his mother who is his legal guardian and caretaker.  C/o Fever. Dx with aspiration pneumonia  Focused Assessment:  Pt arrives by ambulance.  Labs drawn and sent along with 2 sets BC.  Pt treated with antibiotics. Frequent suctioning, dry secretions, purwitk with approx 400cc output.  Fluid bolus on arrival as charted.  Difficult IV/lab stick    Treatments and/or interventions provided: See above and MAR  Patient's response to treatments and/or interventions: VSS, pt was transitioned to Oxymask by 10 litres which he tolerates very well.  Frequent repositioning    To be done/followed up on inpatient unit:  Admit orders    Does this patient have any cognitive concerns?:  Aphasia    Activity level - Baseline/Home:  Total Care  Activity Level - Current:   Total Care    Patient's Preferred language: English   Needed?: No    Isolation: None and Contact   Infection: Other   Patient tested for COVID 19 prior to admission: NO  Bariatric?: No    Vital Signs:   Vitals:    09/15/24 0652 09/15/24 0653 09/15/24 0654 09/15/24 0655   BP:       Pulse: 76 86 75 92   Resp: (!) 5 (!) 7 10 12   Temp:        TempSrc:       SpO2: 90% (!) 85% (!) 78% 100%       Cardiac Rhythm:     Was the PSS-3 completed:   Yes  What interventions are required if any?               Family Comments:   Family aware  OBS brochure/video discussed/provided to patient/family: N/A              Name of person given brochure if not patient: NA              Relationship to patient: NA    For the majority of the shift this patient's behavior was Green.   Behavioral interventions performed were NA.    ED NURSE PHONE NUMBER: Rose CHRISTINE until 3p RN 9

## 2024-09-16 LAB
ALBUMIN SERPL BCG-MCNC: 3.6 G/DL (ref 3.5–5.2)
ALP SERPL-CCNC: 118 U/L (ref 40–150)
ALT SERPL W P-5'-P-CCNC: 50 U/L (ref 0–70)
ANION GAP SERPL CALCULATED.3IONS-SCNC: 10 MMOL/L (ref 7–15)
AST SERPL W P-5'-P-CCNC: 53 U/L (ref 0–45)
BILIRUB DIRECT SERPL-MCNC: <0.2 MG/DL (ref 0–0.3)
BILIRUB SERPL-MCNC: 0.3 MG/DL
BUN SERPL-MCNC: 11.7 MG/DL (ref 8–23)
CALCIUM SERPL-MCNC: 8.2 MG/DL (ref 8.8–10.4)
CHLORIDE SERPL-SCNC: 115 MMOL/L (ref 98–107)
CREAT SERPL-MCNC: 0.75 MG/DL (ref 0.67–1.17)
EGFRCR SERPLBLD CKD-EPI 2021: >90 ML/MIN/1.73M2
ERYTHROCYTE [DISTWIDTH] IN BLOOD BY AUTOMATED COUNT: 16.7 % (ref 10–15)
GLUCOSE SERPL-MCNC: 108 MG/DL (ref 70–99)
HCO3 SERPL-SCNC: 23 MMOL/L (ref 22–29)
HCT VFR BLD AUTO: 42.5 % (ref 40–53)
HGB BLD-MCNC: 13.7 G/DL (ref 13.3–17.7)
MCH RBC QN AUTO: 28.6 PG (ref 26.5–33)
MCHC RBC AUTO-ENTMCNC: 32.2 G/DL (ref 31.5–36.5)
MCV RBC AUTO: 89 FL (ref 78–100)
PLATELET # BLD AUTO: 137 10E3/UL (ref 150–450)
POTASSIUM SERPL-SCNC: 3.8 MMOL/L (ref 3.4–5.3)
PROT SERPL-MCNC: 7 G/DL (ref 6.4–8.3)
RBC # BLD AUTO: 4.79 10E6/UL (ref 4.4–5.9)
SODIUM SERPL-SCNC: 148 MMOL/L (ref 135–145)
WBC # BLD AUTO: 7.7 10E3/UL (ref 4–11)

## 2024-09-16 PROCEDURE — 250N000009 HC RX 250: Performed by: HOSPITALIST

## 2024-09-16 PROCEDURE — 84295 ASSAY OF SERUM SODIUM: CPT | Performed by: HOSPITALIST

## 2024-09-16 PROCEDURE — 84450 TRANSFERASE (AST) (SGOT): CPT | Performed by: HOSPITALIST

## 2024-09-16 PROCEDURE — 99232 SBSQ HOSP IP/OBS MODERATE 35: CPT | Performed by: INTERNAL MEDICINE

## 2024-09-16 PROCEDURE — 250N000009 HC RX 250: Performed by: INTERNAL MEDICINE

## 2024-09-16 PROCEDURE — 999N000157 HC STATISTIC RCP TIME EA 10 MIN

## 2024-09-16 PROCEDURE — 250N000013 HC RX MED GY IP 250 OP 250 PS 637: Performed by: HOSPITALIST

## 2024-09-16 PROCEDURE — 250N000013 HC RX MED GY IP 250 OP 250 PS 637: Performed by: INTERNAL MEDICINE

## 2024-09-16 PROCEDURE — 94640 AIRWAY INHALATION TREATMENT: CPT

## 2024-09-16 PROCEDURE — 250N000011 HC RX IP 250 OP 636: Performed by: INTERNAL MEDICINE

## 2024-09-16 PROCEDURE — 120N000001 HC R&B MED SURG/OB

## 2024-09-16 PROCEDURE — 82374 ASSAY BLOOD CARBON DIOXIDE: CPT | Performed by: HOSPITALIST

## 2024-09-16 PROCEDURE — 94640 AIRWAY INHALATION TREATMENT: CPT | Mod: 76

## 2024-09-16 PROCEDURE — 250N000011 HC RX IP 250 OP 636: Performed by: HOSPITALIST

## 2024-09-16 PROCEDURE — 85014 HEMATOCRIT: CPT | Performed by: HOSPITALIST

## 2024-09-16 PROCEDURE — 258N000003 HC RX IP 258 OP 636: Performed by: HOSPITALIST

## 2024-09-16 PROCEDURE — 84460 ALANINE AMINO (ALT) (SGPT): CPT | Performed by: HOSPITALIST

## 2024-09-16 PROCEDURE — 36415 COLL VENOUS BLD VENIPUNCTURE: CPT | Performed by: HOSPITALIST

## 2024-09-16 RX ORDER — AMOXICILLIN 250 MG
1 CAPSULE ORAL 2 TIMES DAILY PRN
Status: DISCONTINUED | OUTPATIENT
Start: 2024-09-16 | End: 2024-09-18 | Stop reason: HOSPADM

## 2024-09-16 RX ORDER — GLYCOPYRROLATE 1 MG/1
2 TABLET ORAL 2 TIMES DAILY
Status: DISCONTINUED | OUTPATIENT
Start: 2024-09-16 | End: 2024-09-18 | Stop reason: HOSPADM

## 2024-09-16 RX ORDER — ALBUTEROL SULFATE 0.83 MG/ML
SOLUTION RESPIRATORY (INHALATION)
Status: COMPLETED
Start: 2024-09-16 | End: 2024-09-16

## 2024-09-16 RX ORDER — HYDROCORTISONE 5 MG/1
15 TABLET ORAL EVERY MORNING
Status: DISCONTINUED | OUTPATIENT
Start: 2024-09-16 | End: 2024-09-16

## 2024-09-16 RX ORDER — CARBAMAZEPINE 100 MG/5ML
150 SUSPENSION ORAL 3 TIMES DAILY
Status: DISCONTINUED | OUTPATIENT
Start: 2024-09-17 | End: 2024-09-18 | Stop reason: HOSPADM

## 2024-09-16 RX ORDER — SENNOSIDES 8.6 MG
1 TABLET ORAL 2 TIMES DAILY PRN
Status: DISCONTINUED | OUTPATIENT
Start: 2024-09-16 | End: 2024-09-18 | Stop reason: HOSPADM

## 2024-09-16 RX ORDER — LEVOTHYROXINE SODIUM 100 UG/1
100 TABLET ORAL DAILY
Status: DISCONTINUED | OUTPATIENT
Start: 2024-09-16 | End: 2024-09-18 | Stop reason: HOSPADM

## 2024-09-16 RX ORDER — DEXTROSE MONOHYDRATE 100 MG/ML
INJECTION, SOLUTION INTRAVENOUS CONTINUOUS PRN
Status: DISCONTINUED | OUTPATIENT
Start: 2024-09-16 | End: 2024-09-18 | Stop reason: HOSPADM

## 2024-09-16 RX ORDER — HYDROCORTISONE 5 MG/1
15 TABLET ORAL EVERY MORNING
Status: DISCONTINUED | OUTPATIENT
Start: 2024-09-17 | End: 2024-09-18 | Stop reason: HOSPADM

## 2024-09-16 RX ORDER — ALBUTEROL SULFATE 0.83 MG/ML
2.5 SOLUTION RESPIRATORY (INHALATION) EVERY 4 HOURS PRN
Status: DISCONTINUED | OUTPATIENT
Start: 2024-09-16 | End: 2024-09-18 | Stop reason: HOSPADM

## 2024-09-16 RX ORDER — METOCLOPRAMIDE HYDROCHLORIDE 5 MG/5ML
10 SOLUTION ORAL
Status: DISCONTINUED | OUTPATIENT
Start: 2024-09-16 | End: 2024-09-18 | Stop reason: HOSPADM

## 2024-09-16 RX ORDER — AMOXICILLIN 250 MG
2 CAPSULE ORAL 2 TIMES DAILY PRN
Status: DISCONTINUED | OUTPATIENT
Start: 2024-09-16 | End: 2024-09-18 | Stop reason: HOSPADM

## 2024-09-16 RX ORDER — ALBUTEROL SULFATE 5 MG/ML
2.5 SOLUTION RESPIRATORY (INHALATION) EVERY 4 HOURS PRN
Status: DISCONTINUED | OUTPATIENT
Start: 2024-09-16 | End: 2024-09-16

## 2024-09-16 RX ORDER — CALCIUM CARBONATE 500 MG/1
1000 TABLET, CHEWABLE ORAL 4 TIMES DAILY PRN
Status: DISCONTINUED | OUTPATIENT
Start: 2024-09-16 | End: 2024-09-18 | Stop reason: HOSPADM

## 2024-09-16 RX ORDER — ALBUTEROL SULFATE 5 MG/ML
2.5 SOLUTION RESPIRATORY (INHALATION) EVERY 6 HOURS PRN
Status: DISCONTINUED | OUTPATIENT
Start: 2024-09-16 | End: 2024-09-16

## 2024-09-16 RX ORDER — POLYETHYLENE GLYCOL 3350 17 G/17G
17 POWDER, FOR SOLUTION ORAL DAILY
Status: DISCONTINUED | OUTPATIENT
Start: 2024-09-16 | End: 2024-09-16

## 2024-09-16 RX ORDER — POLYETHYLENE GLYCOL 3350 17 G/17G
17 POWDER, FOR SOLUTION ORAL DAILY
Status: DISCONTINUED | OUTPATIENT
Start: 2024-09-16 | End: 2024-09-18 | Stop reason: HOSPADM

## 2024-09-16 RX ORDER — SODIUM CHLORIDE AND POTASSIUM CHLORIDE 150; 450 MG/100ML; MG/100ML
INJECTION, SOLUTION INTRAVENOUS CONTINUOUS
Status: DISCONTINUED | OUTPATIENT
Start: 2024-09-16 | End: 2024-09-17

## 2024-09-16 RX ORDER — ACETYLCYSTEINE 200 MG/ML
2 SOLUTION ORAL; RESPIRATORY (INHALATION) 4 TIMES DAILY
Status: DISCONTINUED | OUTPATIENT
Start: 2024-09-16 | End: 2024-09-18 | Stop reason: HOSPADM

## 2024-09-16 RX ORDER — SENNOSIDES 8.6 MG
1 TABLET ORAL 2 TIMES DAILY PRN
Status: DISCONTINUED | OUTPATIENT
Start: 2024-09-16 | End: 2024-09-16

## 2024-09-16 RX ADMIN — PIPERACILLIN AND TAZOBACTAM 3.38 G: 3; .375 INJECTION, POWDER, FOR SOLUTION INTRAVENOUS at 18:45

## 2024-09-16 RX ADMIN — METOCLOPRAMIDE HYDROCHLORIDE 10 MG: 5 SOLUTION ORAL at 13:48

## 2024-09-16 RX ADMIN — Medication 40 MG: at 13:47

## 2024-09-16 RX ADMIN — BRIVARACETAM 100 MG: 10 SOLUTION ORAL at 08:58

## 2024-09-16 RX ADMIN — GLYCOPYRROLATE 2 MG: 1 TABLET ORAL at 14:14

## 2024-09-16 RX ADMIN — POLYETHYLENE GLYCOL 3350 17 G: 17 POWDER, FOR SOLUTION ORAL at 13:48

## 2024-09-16 RX ADMIN — METOCLOPRAMIDE HYDROCHLORIDE 10 MG: 5 SOLUTION ORAL at 16:19

## 2024-09-16 RX ADMIN — ALBUTEROL SULFATE 2.5 MG: 2.5 SOLUTION RESPIRATORY (INHALATION) at 20:38

## 2024-09-16 RX ADMIN — CARBAMAZEPINE 150 MG: 100 SUSPENSION ORAL at 11:52

## 2024-09-16 RX ADMIN — Medication 40 MG: at 21:47

## 2024-09-16 RX ADMIN — POTASSIUM & SODIUM PHOSPHATES POWDER PACK 280-160-250 MG 1 PACKET: 280-160-250 PACK at 11:52

## 2024-09-16 RX ADMIN — SODIUM CHLORIDE, PRESERVATIVE FREE: 5 INJECTION INTRAVENOUS at 00:14

## 2024-09-16 RX ADMIN — CARBAMAZEPINE 100 MG: 100 SUSPENSION ORAL at 18:45

## 2024-09-16 RX ADMIN — CARBAMAZEPINE 150 MG: 100 SUSPENSION ORAL at 06:28

## 2024-09-16 RX ADMIN — GLYCOPYRROLATE 2 MG: 1 TABLET ORAL at 21:47

## 2024-09-16 RX ADMIN — PIPERACILLIN AND TAZOBACTAM 3.38 G: 3; .375 INJECTION, POWDER, FOR SOLUTION INTRAVENOUS at 06:28

## 2024-09-16 RX ADMIN — CARBAMAZEPINE 150 MG: 100 SUSPENSION ORAL at 00:14

## 2024-09-16 RX ADMIN — POTASSIUM CHLORIDE AND SODIUM CHLORIDE: 450; 150 INJECTION, SOLUTION INTRAVENOUS at 11:22

## 2024-09-16 RX ADMIN — PIPERACILLIN AND TAZOBACTAM 3.38 G: 3; .375 INJECTION, POWDER, FOR SOLUTION INTRAVENOUS at 00:13

## 2024-09-16 RX ADMIN — ENOXAPARIN SODIUM 40 MG: 40 INJECTION SUBCUTANEOUS at 11:50

## 2024-09-16 RX ADMIN — ACETYLCYSTEINE 2 ML: 200 SOLUTION ORAL; RESPIRATORY (INHALATION) at 16:01

## 2024-09-16 RX ADMIN — BRIVARACETAM 100 MG: 10 SOLUTION ORAL at 22:43

## 2024-09-16 RX ADMIN — HYDROCORTISONE SODIUM SUCCINATE 25 MG: 100 INJECTION, POWDER, FOR SOLUTION INTRAMUSCULAR; INTRAVENOUS at 00:13

## 2024-09-16 RX ADMIN — PIPERACILLIN AND TAZOBACTAM 3.38 G: 3; .375 INJECTION, POWDER, FOR SOLUTION INTRAVENOUS at 14:14

## 2024-09-16 RX ADMIN — ACETYLCYSTEINE 2 ML: 200 SOLUTION ORAL; RESPIRATORY (INHALATION) at 20:39

## 2024-09-16 RX ADMIN — LEVOTHYROXINE SODIUM 100 MCG: 100 TABLET ORAL at 11:52

## 2024-09-16 RX ADMIN — METOCLOPRAMIDE HYDROCHLORIDE 10 MG: 5 SOLUTION ORAL at 21:47

## 2024-09-16 RX ADMIN — HYDROCORTISONE SODIUM SUCCINATE 25 MG: 100 INJECTION, POWDER, FOR SOLUTION INTRAMUSCULAR; INTRAVENOUS at 06:27

## 2024-09-16 RX ADMIN — ALBUTEROL SULFATE 2.5 MG: 2.5 SOLUTION RESPIRATORY (INHALATION) at 16:01

## 2024-09-16 RX ADMIN — HYDROCORTISONE 7.5 MG: 5 TABLET ORAL at 13:48

## 2024-09-16 ASSESSMENT — ACTIVITIES OF DAILY LIVING (ADL)
ADLS_ACUITY_SCORE: 61
ADLS_ACUITY_SCORE: 57
ADLS_ACUITY_SCORE: 61
ADLS_ACUITY_SCORE: 57
ADLS_ACUITY_SCORE: 61
ADLS_ACUITY_SCORE: 57
ADLS_ACUITY_SCORE: 61
ADLS_ACUITY_SCORE: 61
ADLS_ACUITY_SCORE: 57
ADLS_ACUITY_SCORE: 61
ADLS_ACUITY_SCORE: 57

## 2024-09-16 NOTE — PLAN OF CARE
Date & Time: 9/16/24 AM shift  Orientation: Alert to self, mostly nonverbal-makes some sounds or moans. More sleepy towards end of this shift, sleeping between cares.   Activity Level: A2 lift, T&R, total care    Fall Risk: Yes   Behavior & Aggression: Green   Pain Management: appears comfortable, denies pain by shaking head when asked   ABNL VS/O2: VSS on RA, bradycardic at times when sleeping. HR high 40s to mid 50s.   Tele: N/A   ABNL Lab/BG: Na 148. AST 53  Diet: NPO, tube feed  via J site. TF was started at 45 ml/hr-will need to increase rate to 60 ml/hr in 4 hrs ( at 6 pm) if tolerating.  ml/4hr. Meds via J tube as well.   Bowel/Bladder: Incontinent B&B, ext cath in place-good urine output. BM x 1-soft to loose.   Skin:  R arm abrasion -lotion applied, R knee scab, blanchable redness to sacrum coccyx meplex in place. Barrier and powder applied to buttock and memo area.   Drains/Devices: R PIV infusing IVF  Tests/Procedures: None   Anticipated DC Date: Pending, continues on IV abx Zosyn q6hr.   Other Important Info: Contact for MRSA and VRE. Frequent oral cares, suction as needed.

## 2024-09-16 NOTE — PLAN OF CARE
Goal Outcome Evaluation:    Summary:  Aspiration pneumonia of both lungs.  R sided hemiplegia, non-verbal,    Date & Time: 9/15/24 0348-7170   Orientation:   A/Ox HECTOR non-verbal  Activity Level: A2 lift, T&R, total care    Fall Risk: Yes   Behavior & Aggression: Green   Pain Management: No non-verbal signs of pain rflacc 0.   ABNL VS/O2:  VSS on 10L oxy mask.which pt keep removing    Tele: N/A   ABNL Lab/BG: WBC 14.9   Diet: NPO tube feed but held per MD potential restart tomorrow    Bowel/Bladder: Incontinent B&B, ext cath in place    Skin:  R arm abrasion, R knee scab, blanchable redness to sacrum coccyx meplex placed  Drains/Devices: R PIV infusing NS at 100 ml/hr    Tests/Procedures: None   Anticipated DC Date: Pending    Other Important Info: Contact for MRSA and VRE

## 2024-09-16 NOTE — PROGRESS NOTES
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  And subsequently get admitted    Assessment & Plan     Aspiration pneumonitis  Acute hypoxic respiratory failure: Resolved  Sepsis secondary to aspiration pneumonia: Present on admission  Chronic dysphagia status post PEG tube placement      Patient presented to hospital with fevers after an episode of emesis on September 14.   He was tachycardic, have leukocytosis, and hypoxic.  Low-grade temp of 99.8 at time of admission, consistent with sepsis  CT chest PE abdominal pelvis was done on admission, show aspiration pneumonitis in the lower lobe right more than left.  On admission he was requiring 10 L of supplemental oxygen through OxyMask.  Has bilateral rhonchi and crackles positive.  He was admitted, started on IV fluids, kept n.p.o., started on IV Zosyn  Blood culture obtained and no growth so far.  Overnight he did respond very well to the treatment, and we know from the oxygen currently on room air  Breathing more comfortably at this time.  I will cut down the IV fluids to 50 mL changed to half-strength normal saline with potassium  Started on tube feeding, consult nutritionist will start tube feeding slowly  Aspiration precautions  Overall improve, leukocytosis resolved, hypoxia resolved currently on room air       Mildly elevated LFTs: Likely secondary sepsis  On admission alkaline phosphatase elevated 169, ALT 91,   Total bilirubin normal.  This is likely secondary to sepsis and infection.    Improved, alkaline phosphatase normal, ALT normal, AST improved to 53.  Continue to monitor for  now  CT abdomen pelvis no acute issue with the liver, normal gallbladder and bile ducts.     History of TBI  Spastic hemiaplagia and right sided hemiparesis  Nonverbal  Hx of seizures  The patient is nonverbal at baseline. He is fully dependent on his mother for cares.  -Resume PTA medication, including by brivaracetam, carbamazepine     Panhypopituitarism  Adrenal insufficiency  Hypothyroidism  Status post 100 mg of hydrocortisone in the ER for stress dosing.  Patient was started on stress dose of hydrocortisone 100 mg every 6 hour.  Patient is hemodynamically stable at this time.  I will stop the IV hydrocortisone and started him on oral  PTA doses of hydrocortisone through his PEG tube.  -     GERD.    -Continue with the Protonix.     Pancreatic cyst lesion  Status post EUS July 2024.  Cytology negative for malignancy.          Clinically Significant Risk Factors         # Hypernatremia: Highest Na = 148 mmol/L in last 2 days, will monitor as appropriate  # Hypocalcemia: Lowest Ca = 8.2 mg/dL in last 2 days, will monitor and replace as appropriate                       # Financial/Environmental Concerns:                   DVT Prophylaxis: Enoxaparin (Lovenox) SQ  Code Status: Full Code    Disposition: Expected discharge in 2 days once stable.    Medically Ready for Discharge: Anticipated in 2-4 Days, tolerated tube feeding well.  Ammonia result        Kane Mars MD, MD  Text Page  (7am - 6pm)    Interval History   Patient seen and evaluated this morning, much better as compared to before, patient is nonverbal, breathing comfortably, is now on room air.    No other significant event overnight    -Data reviewed today: I reviewed all new labs and imaging results over the last 24 hours. I personally reviewed no images or EKG's today.    Physical Exam   Temp: 97.8  F (36.6  C) Temp src: Axillary BP: (!) 145/64 Pulse: 55   Resp: 16 SpO2: 99 % O2 Device: None (Room air) Oxygen Delivery: 2 LPM  Vitals:    09/16/24  0647   Weight: 66.6 kg (146 lb 13.2 oz)     Vital Signs with Ranges  Temp:  [96.7  F (35.9  C)-97.8  F (36.6  C)] 97.8  F (36.6  C)  Pulse:  [55-79] 55  Resp:  [12-16] 16  BP: (129-145)/(64-78) 145/64  SpO2:  [90 %-99 %] 99 %  I/O last 3 completed shifts:  In: -   Out: 2100 [Urine:2100]    Constitutional: awake, alert, cooperative, no apparent distress, and appears stated age  Eyes: Lids and lashes normal, pupils equal, round and reactive to light, extra ocular muscles intact, sclera clear, conjunctiva normal  Respiratory: No increased work of breathing, good air exchange, clear to auscultation bilaterally, no crackles or wheezing  Cardiovascular: Normal apical impulse, regular rate and rhythm, normal S1 and S2, no S3 or S4, and no murmur noted  GI: No scars, normal bowel sounds, soft, non-distended, non-tender, no masses palpated, no hepatosplenomegally  Musculoskeletal: no lower extremity pitting edema present  Neurologic: No focal deficit    Medications   Current Facility-Administered Medications   Medication Dose Route Frequency Provider Last Rate Last Admin    0.45% sodium chloride + KCl 20 mEq/L infusion   Intravenous Continuous Kane Mars MD         Current Facility-Administered Medications   Medication Dose Route Frequency Provider Last Rate Last Admin    Brivaracetam (BRIVIACT) solution 100 mg  100 mg Oral or Feeding Tube BID Tremayne Leroy MD   100 mg at 09/16/24 0858    carBAMazepine (TEGretol) suspension 100 mg  100 mg Per Feeding Tube Daily Tremayne Leroy MD   100 mg at 09/15/24 2130    carBAMazepine (TEGretol) suspension 150 mg  150 mg Oral TID Tremayne Leroy MD   150 mg at 09/16/24 0628    enoxaparin ANTICOAGULANT (LOVENOX) injection 40 mg  40 mg Subcutaneous Q24H Patrick Hernandez MD   40 mg at 09/15/24 1311    hydrocortisone sodium succinate PF (solu-CORTEF) injection 25 mg  25 mg Intravenous Q6H Patrick Hernandez MD   25 mg at 09/16/24 0627     piperacillin-tazobactam (ZOSYN) 3.375 g vial to attach to  mL bag  3.375 g Intravenous Q6H Patrick Hernandez MD   3.375 g at 09/16/24 0628    sodium chloride (PF) 0.9% PF flush 3 mL  3 mL Intracatheter Q8H Patrick Hernandez MD   3 mL at 09/15/24 1305       Data   Recent Labs   Lab 09/16/24  0850 09/15/24  0158 09/13/24  1953   WBC 7.7 14.9* 9.7   HGB 13.7 14.3 14.3   MCV 89 87 87   * 182 203   * 136 140   POTASSIUM 3.8 4.3 4.2   CHLORIDE 115* 100 104   CO2 23 27 26   BUN 11.7 20.8 24.1*   CR 0.75 0.85  0.85 0.85   ANIONGAP 10 9 10   STEVE 8.2* 8.7* 8.8   * 117* 114*   ALBUMIN 3.6 4.0  --    PROTTOTAL 7.0 8.0  --    BILITOTAL 0.3 0.4  --    ALKPHOS 118 169*  --    ALT 50 91*  --    AST 53* 138*  --        No results found for this or any previous visit (from the past 24 hour(s)).

## 2024-09-16 NOTE — PLAN OF CARE
Summary:  Aspiration pneumonia of both lungs.  R sided hemiplegia, non-verbal,    Date & Time: 9/15-9/16/24 5483-0757   Orientation:   HECTOR orientation d/t nonverbal, calm, had not slept all night  Activity Level: A2 lift, T&R, total care    Fall Risk: Yes   Behavior & Aggression: Green   Pain Management: No non-verbal signs of pain rflacc 2,0   ABNL VS/O2:  O2 weaned from 10L oxymask to RA (sating 94-97%) and HTN (SBP 140s), other VSS  Tele: N/A   ABNL Lab/BG: WBC 14.9, LFTs elevated  Diet: NPO, tube feed but held per MD potential restart tomorrow    Bowel/Bladder: Incontinent B&B, ext cath in place  BM x1 this shift  Skin:  R arm abrasion -lotion applied, R knee scab, blanchable redness to sacrum coccyx meplex placed  Drains/Devices: R PIV infusing NS at 100 ml/hr    Tests/Procedures: None   Anticipated DC Date: Pending    Other Important Info: Contact for MRSA and VRE, suction x2 - very mininal output

## 2024-09-16 NOTE — CONSULTS
"CLINICAL NUTRITION SERVICES  -  ASSESSMENT NOTE    Recommendations Ordered by Registered Dietitian (RD):   Via Nonstop Games:   Jevity 1.5 @ 60 mL/hr x 22 hours (hold for Synthroid)  Provides 1320 mL, 1980 kcal (30 kcal/kg), 85 g protein (1.3 g/kg), 1003 mL free water, 28 g fiber daily.   Free Water Flush: 120 mL q 4 hours for hydration and patency     Begin TF @ 45 mL/hr, advance to goal after 4 hours.    Malnutrition:   % Weight Loss:  Weight loss does not meet criteria for malnutrition   % Intake:  Decreased intake does not meet criteria for malnutrition - TF held with admission   Subcutaneous Fat Loss:  Low baseline stores  Muscle Loss:  Low baseline stores  Fluid Retention:  None noted    Malnutrition Diagnosis: Patient does not meet two of the above criteria necessary for diagnosing malnutrition     REASON FOR ASSESSMENT  Keyon Farias is a 64 year old male seen by Registered Dietitian for Provider Order - Registered Dietitian to Assess and Order TF per Medical Nutrition Therapy Protocol    NUTRITION HISTORY  - Obtained from chart. Pt well-known from prior admissions for the same problem.   - Known GJT with reliance on TF.   - Historically requires daily phos replacement to maintain normal blood phos level.     CURRENT NUTRITION ORDERS  Diet Order:     NPO    Current Intake/Tolerance:  Plan to restart TF per home regimen.     NUTRITION FOCUSED PHYSICAL ASSESSMENT FOR DIAGNOSING MALNUTRITION)  Yes  No:  Deferred today. Last assessment 8/24 pt observed with low baseline fat/muscle stores.     Obtained from Chart/Interdisciplinary Team:  GTJ in place. Last exchange was on 8/5/24.    ANTHROPOMETRICS  Height: 5' 10\"  Weight: 146 lbs 13.22 oz (66.6 kg)   BMI not validated in hemiplegia   IBW: ~63 kg, adjusted for hemiplegia   % IBW: 106%  Weight History: Wt trends consistent with typical fluctuations - ranging ~145-165#. Will monitor.   Wt Readings from Last 10 Encounters:   09/16/24 66.6 kg (146 lb 13.2 oz)   08/31/24 67 kg " (147 lb 11.3 oz)   08/05/24 67.6 kg (149 lb)   07/24/24 67.9 kg (149 lb 11.1 oz)   07/12/24 69.4 kg (153 lb)   06/04/24 74.8 kg (165 lb)   05/05/24 75.1 kg (165 lb 9.1 oz)   04/20/24 71.2 kg (156 lb 15.6 oz)   03/25/24 68.9 kg (151 lb 14.4 oz)   01/26/24 68.1 kg (150 lb 2.1 oz)       LABS  Labs reviewed  Na 148 (L)    MEDICATIONS  Medications reviewed  Synthroid   Reglan 4x daily   Miralax  NeutraPhos daily   NaCl + KCl IVF @ 50 ml/hr     ASSESSED NUTRITION NEEDS PER APPROVED PRACTICE GUIDELINES:  Dosing Weight 67 kg   Estimated Energy Needs: 5568-3622 kcals (25-30 Kcal/Kg)  Justification: maintenance  Estimated Protein Needs:  grams protein (1.2-1.5 g pro/Kg)  Justification: preservation of lean body mass  Estimated Fluid Needs: 1 mL/kcal   Justification: maintenance    MALNUTRITION:  % Weight Loss:  Weight loss does not meet criteria for malnutrition   % Intake:  Decreased intake does not meet criteria for malnutrition - TF held with admission   Subcutaneous Fat Loss:  Low baseline stores  Muscle Loss:  Low baseline stores  Fluid Retention:  None noted    Malnutrition Diagnosis: Patient does not meet two of the above criteria necessary for diagnosing malnutrition    NUTRITION DIAGNOSIS:  Predicted inadequate nutrient intake (energy/protein) related to hospitalization with interruption to usual TF regimen.     NUTRITION INTERVENTIONS  Recommendations / Nutrition Prescription  Via GramVaani 1.5 @ 60 mL/hr x 22 hours (hold for Synthroid)  Provides 1320 mL, 1980 kcal (30 kcal/kg), 85 g protein (1.3 g/kg), 1003 mL free water, 28 g fiber daily.   Free Water Flush: 120 mL q 4 hours for hydration and patency     Begin TF @ 45 mL/hr, advance to goal after 4 hours.     Implementation  Nutrition education: No education needs assessed at this time  EN Composition, EN Schedule, and Feeding Tube Flush: ordered    Nutrition Goals  TF @ Goal to provide % estimated needs.     MONITORING AND EVALUATION:  Progress  towards goals will be monitored and evaluated per protocol and Practice Guidelines    Marisel Fernández RD, LD  Pager: [phone]

## 2024-09-17 LAB
ALBUMIN SERPL BCG-MCNC: 3.3 G/DL (ref 3.5–5.2)
ALP SERPL-CCNC: 92 U/L (ref 40–150)
ALT SERPL W P-5'-P-CCNC: 33 U/L (ref 0–70)
ANION GAP SERPL CALCULATED.3IONS-SCNC: 8 MMOL/L (ref 7–15)
AST SERPL W P-5'-P-CCNC: 33 U/L (ref 0–45)
BILIRUB SERPL-MCNC: 0.2 MG/DL
BUN SERPL-MCNC: 9.8 MG/DL (ref 8–23)
CALCIUM SERPL-MCNC: 7.8 MG/DL (ref 8.8–10.4)
CHLORIDE SERPL-SCNC: 109 MMOL/L (ref 98–107)
CREAT SERPL-MCNC: 0.76 MG/DL (ref 0.67–1.17)
EGFRCR SERPLBLD CKD-EPI 2021: >90 ML/MIN/1.73M2
GLUCOSE SERPL-MCNC: 118 MG/DL (ref 70–99)
HCO3 SERPL-SCNC: 26 MMOL/L (ref 22–29)
MAGNESIUM SERPL-MCNC: 2.1 MG/DL (ref 1.7–2.3)
PHOSPHATE SERPL-MCNC: 2.1 MG/DL (ref 2.5–4.5)
POTASSIUM SERPL-SCNC: 3.5 MMOL/L (ref 3.4–5.3)
PROT SERPL-MCNC: 6.1 G/DL (ref 6.4–8.3)
SODIUM SERPL-SCNC: 143 MMOL/L (ref 135–145)

## 2024-09-17 PROCEDURE — 99232 SBSQ HOSP IP/OBS MODERATE 35: CPT | Performed by: INTERNAL MEDICINE

## 2024-09-17 PROCEDURE — 250N000011 HC RX IP 250 OP 636: Performed by: INTERNAL MEDICINE

## 2024-09-17 PROCEDURE — 94640 AIRWAY INHALATION TREATMENT: CPT

## 2024-09-17 PROCEDURE — 36415 COLL VENOUS BLD VENIPUNCTURE: CPT | Performed by: INTERNAL MEDICINE

## 2024-09-17 PROCEDURE — 999N000157 HC STATISTIC RCP TIME EA 10 MIN

## 2024-09-17 PROCEDURE — 120N000001 HC R&B MED SURG/OB

## 2024-09-17 PROCEDURE — 250N000009 HC RX 250: Performed by: INTERNAL MEDICINE

## 2024-09-17 PROCEDURE — 250N000011 HC RX IP 250 OP 636: Performed by: HOSPITALIST

## 2024-09-17 PROCEDURE — 250N000013 HC RX MED GY IP 250 OP 250 PS 637: Performed by: HOSPITALIST

## 2024-09-17 PROCEDURE — 250N000013 HC RX MED GY IP 250 OP 250 PS 637: Performed by: INTERNAL MEDICINE

## 2024-09-17 PROCEDURE — 94640 AIRWAY INHALATION TREATMENT: CPT | Mod: 76

## 2024-09-17 PROCEDURE — 84100 ASSAY OF PHOSPHORUS: CPT | Performed by: INTERNAL MEDICINE

## 2024-09-17 PROCEDURE — 80053 COMPREHEN METABOLIC PANEL: CPT | Performed by: INTERNAL MEDICINE

## 2024-09-17 PROCEDURE — 83735 ASSAY OF MAGNESIUM: CPT | Performed by: INTERNAL MEDICINE

## 2024-09-17 RX ORDER — AMOXICILLIN AND CLAVULANATE POTASSIUM 400; 57 MG/5ML; MG/5ML
875 POWDER, FOR SUSPENSION ORAL 2 TIMES DAILY
Status: DISCONTINUED | OUTPATIENT
Start: 2024-09-18 | End: 2024-09-18 | Stop reason: HOSPADM

## 2024-09-17 RX ADMIN — METOCLOPRAMIDE HYDROCHLORIDE 10 MG: 5 SOLUTION ORAL at 23:26

## 2024-09-17 RX ADMIN — PIPERACILLIN AND TAZOBACTAM 3.38 G: 3; .375 INJECTION, POWDER, FOR SOLUTION INTRAVENOUS at 06:30

## 2024-09-17 RX ADMIN — POTASSIUM & SODIUM PHOSPHATES POWDER PACK 280-160-250 MG 1 PACKET: 280-160-250 PACK at 16:25

## 2024-09-17 RX ADMIN — GLYCOPYRROLATE 2 MG: 1 TABLET ORAL at 08:28

## 2024-09-17 RX ADMIN — ACETYLCYSTEINE 2 ML: 200 SOLUTION ORAL; RESPIRATORY (INHALATION) at 12:15

## 2024-09-17 RX ADMIN — POTASSIUM & SODIUM PHOSPHATES POWDER PACK 280-160-250 MG 1 PACKET: 280-160-250 PACK at 08:28

## 2024-09-17 RX ADMIN — ACETYLCYSTEINE 2 ML: 200 SOLUTION ORAL; RESPIRATORY (INHALATION) at 14:46

## 2024-09-17 RX ADMIN — ACETYLCYSTEINE 2 ML: 200 SOLUTION ORAL; RESPIRATORY (INHALATION) at 19:18

## 2024-09-17 RX ADMIN — POLYETHYLENE GLYCOL 3350 17 G: 17 POWDER, FOR SOLUTION ORAL at 08:28

## 2024-09-17 RX ADMIN — CARBAMAZEPINE 100 MG: 100 SUSPENSION ORAL at 18:00

## 2024-09-17 RX ADMIN — PIPERACILLIN AND TAZOBACTAM 3.38 G: 3; .375 INJECTION, POWDER, FOR SOLUTION INTRAVENOUS at 00:58

## 2024-09-17 RX ADMIN — METOCLOPRAMIDE HYDROCHLORIDE 10 MG: 5 SOLUTION ORAL at 06:30

## 2024-09-17 RX ADMIN — ALBUTEROL SULFATE 2.5 MG: 2.5 SOLUTION RESPIRATORY (INHALATION) at 07:19

## 2024-09-17 RX ADMIN — BRIVARACETAM 100 MG: 10 SOLUTION ORAL at 20:52

## 2024-09-17 RX ADMIN — POTASSIUM & SODIUM PHOSPHATES POWDER PACK 280-160-250 MG 1 PACKET: 280-160-250 PACK at 17:57

## 2024-09-17 RX ADMIN — METOCLOPRAMIDE HYDROCHLORIDE 10 MG: 5 SOLUTION ORAL at 13:05

## 2024-09-17 RX ADMIN — ALBUTEROL SULFATE 2.5 MG: 2.5 SOLUTION RESPIRATORY (INHALATION) at 19:18

## 2024-09-17 RX ADMIN — PIPERACILLIN AND TAZOBACTAM 3.38 G: 3; .375 INJECTION, POWDER, FOR SOLUTION INTRAVENOUS at 18:01

## 2024-09-17 RX ADMIN — POTASSIUM CHLORIDE AND SODIUM CHLORIDE: 450; 150 INJECTION, SOLUTION INTRAVENOUS at 08:13

## 2024-09-17 RX ADMIN — POTASSIUM & SODIUM PHOSPHATES POWDER PACK 280-160-250 MG 1 PACKET: 280-160-250 PACK at 23:26

## 2024-09-17 RX ADMIN — ALBUTEROL SULFATE 2.5 MG: 2.5 SOLUTION RESPIRATORY (INHALATION) at 14:46

## 2024-09-17 RX ADMIN — PIPERACILLIN AND TAZOBACTAM 3.38 G: 3; .375 INJECTION, POWDER, FOR SOLUTION INTRAVENOUS at 12:53

## 2024-09-17 RX ADMIN — BRIVARACETAM 100 MG: 10 SOLUTION ORAL at 09:05

## 2024-09-17 RX ADMIN — ACETYLCYSTEINE 2 ML: 200 SOLUTION ORAL; RESPIRATORY (INHALATION) at 07:19

## 2024-09-17 RX ADMIN — CARBAMAZEPINE 150 MG: 100 SUSPENSION ORAL at 13:03

## 2024-09-17 RX ADMIN — ALBUTEROL SULFATE 2.5 MG: 2.5 SOLUTION RESPIRATORY (INHALATION) at 12:15

## 2024-09-17 RX ADMIN — LEVOTHYROXINE SODIUM 100 MCG: 100 TABLET ORAL at 08:28

## 2024-09-17 RX ADMIN — HYDROCORTISONE 7.5 MG: 5 TABLET ORAL at 13:08

## 2024-09-17 RX ADMIN — HYDROCORTISONE 15 MG: 5 TABLET ORAL at 08:26

## 2024-09-17 RX ADMIN — CARBAMAZEPINE 150 MG: 100 SUSPENSION ORAL at 23:26

## 2024-09-17 RX ADMIN — ENOXAPARIN SODIUM 40 MG: 40 INJECTION SUBCUTANEOUS at 12:59

## 2024-09-17 RX ADMIN — GLYCOPYRROLATE 2 MG: 1 TABLET ORAL at 20:52

## 2024-09-17 RX ADMIN — METOCLOPRAMIDE HYDROCHLORIDE 10 MG: 5 SOLUTION ORAL at 16:26

## 2024-09-17 RX ADMIN — Medication 40 MG: at 20:52

## 2024-09-17 RX ADMIN — Medication 40 MG: at 08:29

## 2024-09-17 RX ADMIN — CARBAMAZEPINE 150 MG: 100 SUSPENSION ORAL at 00:58

## 2024-09-17 RX ADMIN — CARBAMAZEPINE 150 MG: 100 SUSPENSION ORAL at 06:29

## 2024-09-17 ASSESSMENT — ACTIVITIES OF DAILY LIVING (ADL)
ADLS_ACUITY_SCORE: 61
DEPENDENT_IADLS:: CLEANING;COOKING;LAUNDRY;SHOPPING;MEAL PREPARATION;MEDICATION MANAGEMENT;MONEY MANAGEMENT;TRANSPORTATION;INCONTINENCE
ADLS_ACUITY_SCORE: 61

## 2024-09-17 NOTE — CONSULTS
Care Management Initial Consult    General Information  Assessment completed with: Patient, Parents, -chart review, Patient and Mom Savannah at bedside  Type of CM/SW Visit: Initial Assessment    Primary Care Provider verified and updated as needed: Yes   Readmission within the last 30 days: previous discharge plan unsuccessful         Advance Care Planning: Advance Care Planning Reviewed: present on chart          Communication Assessment  Patient's communication style: spoken language (English or Bilingual)             Cognitive  Cognitive/Neuro/Behavioral: .WDL except  Level of Consciousness: alert  Arousal Level: opens eyes spontaneously  Orientation: other (see comments) (HECTOR)  Mood/Behavior: calm  Best Language: 2 - Severe aphasia  Speech: incoherent    Living Environment:   People in home: parent(s)     Current living Arrangements: house      Able to return to prior arrangements: yes       Family/Social Support:  Care provided by: homecare agency, other (see comments), parent(s) (PCA)  Provides care for: no one, unable/limited ability to care for self  Marital Status: Single  Support system:            Description of Support System:           Current Resources:   Patient receiving home care services: Yes  Skilled Home Care Services: Skilled Nursing ([facility] Health [facility], [facility] --[phone])     Community Resources: [facility] ([facility] [phone])  Equipment currently used at home: hospital bed, lift device, wheelchair, manual  Supplies currently used at home: Incontinence Supplies, Enteral Nutrition & Supplies, Nebulizer tubing    Employment/Financial:  Employment Status: disabled        Financial Concerns: none           Does the patient's insurance plan have a 3 day qualifying hospital stay waiver?  Yes     Which insurance plan 3 day waiver is available? [employer]    Will the waiver be used for post-acute placement? No    Lifestyle & Psychosocial Needs:  Social Determinants of Health     Food  Insecurity: Unknown (8/25/2024)    Food Insecurity     Within the past 12 months, did you worry that your food would run out before you got money to buy more?: Patient unable to answer     Within the past 12 months, did the food you bought just not last and you didn t have money to get more?: Patient unable to answer   Depression: Not at risk (8/5/2024)    PHQ-2     PHQ-2 Score: 1   Housing Stability: Unknown (8/25/2024)    Housing Stability     Do you have housing? : Patient unable to answer     Are you worried about losing your housing?: Patient unable to answer   Tobacco Use: Medium Risk (9/5/2024)    Patient History     Smoking Tobacco Use: Former     Smokeless Tobacco Use: Never     Passive Exposure: Not on file   Financial Resource Strain: Unknown (8/25/2024)    Financial Resource Strain     Within the past 12 months, have you or your family members you live with been unable to get utilities (heat, electricity) when it was really needed?: Patient unable to answer   Alcohol Use: Not on file   Transportation Needs: Unknown (8/25/2024)    Transportation Needs     Within the past 12 months, has lack of transportation kept you from medical appointments, getting your medicines, non-medical meetings or appointments, work, or from getting things that you need?: Patient unable to answer   Physical Activity: Not on file   Interpersonal Safety: Unknown (8/25/2024)    Interpersonal Safety     Do you feel physically and emotionally safe where you currently live?: Patient unable to answer     Within the past 12 months, have you been hit, slapped, kicked or otherwise physically hurt by someone?: Patient unable to answer     Within the past 12 months, have you been humiliated or emotionally abused in other ways by your partner or ex-partner?: Patient unable to answer   Stress: Not on file   Social Connections: Not on file   Health Literacy: Not on file       Functional Status:  Prior to admission patient needed assistance:  "  Dependent ADLs:: Bathing, Dressing, Eating, Grooming, Incontinence, Positioning, Transfers, Wheelchair-with assist, Toileting  Dependent IADLs:: Cleaning, Cooking, Laundry, Shopping, Meal Preparation, Medication Management, Money Management, Transportation, Incontinence       Mental Health Status:          Chemical Dependency Status:                Values/Beliefs:  Spiritual, Cultural Beliefs, Adventism Practices, Values that affect care: no               Discussed  Partnership in Safe Discharge Planning  document with patient/family: No    Additional Information:  Met patient and Mom Savannah at bedside; explained role in discharge planning.      Patient returned to the hospital, treated for aspiration pneumonia.  He has had frequent ER visits and hospitalizations.    He continues to require total cares for his ADL/IADL's.  He is a functional quadriplegic and non verbal at baseline.  He can verbalize sounds, use eyebrows and \"thumbs up\"  to express himself.      His Mom Savannah takes care of him with assist of PCA's who come in 4 times a week for 5 hours a day.  She has been attempting to hire extra help for months or years but has not been successful.      He does have RN visits from [facility], [employer].  [phone].    Savannah obtains his enteral supplies from [employer].  Recently has changed to an organic formulation of [employer].    The hospitalist anticipates discharge tomorrow.  Savannah preferred late morning for return to home.  A stretcher ride with [facility] was scheduled for Wednesday 9/18 with a  time of [id number].  There is a ramp for entrance to the home.  The PCS was faxed.      Next Steps: Anticipate likely medically ready for discharge home tomorrow via stretcher ride.  Resume home care services.      Pilar Benavides, RN  Inpatient Float Care Coordinator  [facility]  [email or web address]    "

## 2024-09-17 NOTE — PLAN OF CARE
Goal Outcome Evaluation:  Summary: Aspiration pneumonia of both lungs.  R sided hemiplegia, non-verbal    DATE & TIME: 9/17/24, 4392-2607  Cognitive Concerns/ Orientation: HECTOR nonverbal.   BEHAVIOR & AGGRESSION TOOL COLOR: Green  ABNL VS/O2: VSS, on RA  MOBILITY: Assist of 2 w/ lift, T&R  PAIN MANAGMENT: Appears comfortable  DIET: NPO, tube feed running continuously at 60mL/HR w/ intermittent flushes 12O mL q4h  BOWEL/BLADDER: Incontinent  ABNL LAB/BG: Phos- 2.1 replaced, recheck in am.  DRAIN/DEVICES: GJ tube, PIV SL intermittent abx  TELEMETRY RHYTHM: n/a  SKIN:  R arm abrasion, R knee scab, blanchable redness to sacrum coccyx mepilex placed today.  TESTS/PROCEDURES: None  D/C DATE/GOALS/PLACE: Discharge to home tomorrow 9/18, stretcher ride set up w/ [facility].  OTHER IMPORTANT INFO: On contact for MRSA & VRE. Refusing oral cares all day. Mother at bedside intermittently throughout the day.

## 2024-09-17 NOTE — PROGRESS NOTES
Orientation: A/O to self. Contact Iso precautions for MRSA and VRE    Vitals/Tele: VSS on RA    IV Access/drains: R PIV IVF    Diet: NOP, J site tube feed. Flush with 120ml of water q4 hr     Mobility: A x 2 lift, T/R    GI/: Incontinent of B/B, aaron in place.    Wound/Skin: Blanchable coccyx/sacrum redness, R arm abrasion, R knee scab. Open wound on L buttock, MD aware.    Consults:     Discharge Plan: TBD      See Flow sheets for assessment

## 2024-09-17 NOTE — PLAN OF CARE
Goal Outcome Evaluation:      Plan of Care Reviewed With: patient, parent          Outcome Evaluation: Return to home at discharge; no new care management needs; resume home care support

## 2024-09-17 NOTE — PLAN OF CARE
Problem: Adult Inpatient Plan of Care  Goal: Plan of Care Review  Description: The Plan of Care Review/Shift note should be completed every shift.  The Outcome Evaluation is a brief statement about your assessment that the patient is improving, declining, or no change.  This information will be displayed automatically on your shift  note.  Outcome: Progressing     Problem: Pneumonia  Goal: Effective Oxygenation and Ventilation  Outcome: Progressing  Intervention: Optimize Oxygenation and Ventilation  Recent Flowsheet Documentation  Taken 9/16/2024 1642 by Alee Levy, RN  Head of Bed (HOB) Positioning: HOB at 30-45 degrees   Goal Outcome Evaluation:       Pt on room air, doing well.  Lung sounds are diminished.  IV antibiotics being given.  TF up to goal rate.  Pt had large loose/liquid stool this afternoon.  Continue to monitor.  Pressure ulcer noted on left buttock, mepilex changed.  Pt's mom at the bedside.

## 2024-09-17 NOTE — PROGRESS NOTES
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  And subsequently get admitted    Assessment & Plan     Aspiration pneumonitis/pneumonia  Acute hypoxic respiratory failure: Resolved  Sepsis secondary to aspiration pneumonia: Present on admission  Chronic dysphagia status post PEG tube placement      Patient presented to hospital with fevers after an episode of emesis on September 14.   He was tachycardic, have leukocytosis, and hypoxic.  Low-grade temp of 99.8 at time of admission, consistent with sepsis  CT chest PE abdominal pelvis was done on admission, show aspiration pneumonitis in the lower lobe right more than left.  On admission he was requiring 10 L of supplemental oxygen through [employer].  Has bilateral rhonchi and crackles positive.  He was admitted, started on IV fluids, kept n.p.o., started on IV Zosyn  Blood culture obtained and no growth so far.  He responded to the treatment very well, next day of admission he was on room air.  Breathing more comfortably at this time.  Stop IV fluids now.  As he is tolerating tube feeding well.  Aspiration precautions  Overall improve, leukocytosis resolved, hypoxia resolved currently on room air  Continue with IV Zosyn today, and switch to oral Augmentin from tomorrow for 3 more days to complete 5-day course.  Likely discharge home tomorrow if remains stable.       Mildly elevated LFTs: Likely secondary sepsis: Resolved  On admission alkaline phosphatase elevated 169, ALT 91,   Total bilirubin normal.  This is likely secondary to sepsis and infection.  LFTs now normal.  CT abdomen pelvis no  acute issue with the liver, normal gallbladder and bile ducts.     History of TBI  Spastic hemiaplagia and right sided hemiparesis  Nonverbal  Hx of seizures  The patient is nonverbal at baseline. He is fully dependent on his mother for cares.  -Resume PTA medication, including by brivaracetam, carbamazepine     Panhypopituitarism  Adrenal insufficiency  Hypothyroidism  Status post 100 mg of hydrocortisone in the ER for stress dosing.  Patient was started on stress dose of hydrocortisone 100 mg every 6 hour.  Patient is hemodynamically stable at this time.  Stopped IV hydrocortisone and started him on oral  PTA doses of hydrocortisone through his PEG tube on 9/16/2024.  -     GERD.    -Continue with the Protonix.     Pancreatic cyst lesion  Status post EUS July 2024.  Cytology negative for malignancy.     Hypernatremia  Hypophosphatemia  Mild now resolved, discontinue IV fluids now  Replace phosphorus as per replacement protocol.    Clinically Significant Risk Factors         # Hypernatremia: Highest Na = 148 mmol/L in last 2 days, will monitor as appropriate  # Hypocalcemia: Lowest Ca = 7.8 mg/dL in last 2 days, will monitor and replace as appropriate     # Hypoalbuminemia: Lowest albumin = 3.3 g/dL at 9/17/2024  7:12 AM, will monitor as appropriate                   # Financial/Environmental Concerns:         Discontinue IV fluids  Continue tube feeding  Stop IV Zosyn after today's dose  Start on oral Augmentin through G-tube tomorrow for 3 more days to complete 5-day course  Replace phosphorus per protocol  If remains stable discharge home tomorrow        DVT Prophylaxis: Enoxaparin (Lovenox) SQ  Code Status: Full Code    Disposition: Expected discharge tomorrow    Medically Ready for Discharge: Anticipated Tomorrow, continue IV Zosyn today, switch to oral antibiotic from tomorrow and discharge home.      I did call patient's mother who is also his guardian, Savannah over the phone, and updated her on the patient  condition.  Answered all her questions, did explain to her that if he remains stable the patient will be discharged tomorrow, she is agree with that.    Discussed with the patient and the nursing staff taking care of the patient.      Kane Mars MD, MD  Text Page  (7am - 6pm)    Interval History   Patient seen and evaluated this morning, remained comfortable, breathing comfortably, on room air is nonverbal, try to track.  He was started back on tube feeding yesterday and tolerating it well  No nausea vomiting at this time.  Having bowel movement as well.    No other significant event overnight    -Data reviewed today: I reviewed all new labs and imaging results over the last 24 hours. I personally reviewed no images or EKG's today.    Physical Exam   Temp: 97.2  F (36.2  C) Temp src: Axillary BP: 109/47 Pulse: 70   Resp: 16 SpO2: 96 % O2 Device: None (Room air)    Vitals:    09/16/24 0647   Weight: 66.6 kg (146 lb 13.2 oz)     Vital Signs with Ranges  Temp:  [97  F (36.1  C)-97.3  F (36.3  C)] 97.2  F (36.2  C)  Pulse:  [67-70] 70  Resp:  [16-22] 16  BP: (109-139)/(47-70) 109/47  SpO2:  [92 %-99 %] 96 %  I/O last 3 completed shifts:  In: 730 [NG/GT:730]  Out: 1550 [Urine:1550]    Constitutional: awake, alert, cooperative, no apparent distress, and appears stated age  Eyes: Lids and lashes normal, pupils equal, round and reactive to light, extra ocular muscles intact, sclera clear, conjunctiva normal  Respiratory: No increased work of breathing, good air exchange, clear to auscultation bilaterally, no crackles or wheezing  Cardiovascular: Normal apical impulse, regular rate and rhythm, normal S1 and S2, no S3 or S4, and no murmur noted  GI: No scars, normal bowel sounds, soft, non-distended, non-tender, no masses palpated, no hepatosplenomegally  Musculoskeletal: no lower extremity pitting edema present  Neurologic: No focal deficit    Medications   Current Facility-Administered Medications   Medication Dose Route  Frequency Provider Last Rate Last Admin    dextrose 10% infusion   Intravenous Continuous PRN Kane Mars MD         Current Facility-Administered Medications   Medication Dose Route Frequency Provider Last Rate Last Admin    acetylcysteine (MUCOMYST) 20 % nebulizer solution 2 mL  2 mL Nebulization 4x Daily Kane Mars MD   2 mL at 09/17/24 0719    [START ON 9/18/2024] amoxicillin-clavulanate (AUGMENTIN) 400-57 MG/5ML suspension 875 mg  875 mg Oral BID Kane Mras MD        Brivaracetam (BRIVIACT) solution 100 mg  100 mg Oral or Feeding Tube BID Tremayne Leroy MD   100 mg at 09/17/24 0905    carBAMazepine (TEGretol) suspension 100 mg  100 mg Per Feeding Tube Daily Tremayne Leroy MD   100 mg at 09/16/24 1845    carBAMazepine (TEGretol) suspension 150 mg  150 mg Oral or Feeding Tube TID Patrick Hernandez MD   150 mg at 09/17/24 0629    enoxaparin ANTICOAGULANT (LOVENOX) injection 40 mg  40 mg Subcutaneous Q24H Patrick Hernandez MD   40 mg at 09/16/24 1150    glycopyrrolate (ROBINUL) tablet 2 mg  2 mg Oral or Feeding Tube BID Kane Mars MD   2 mg at 09/17/24 0828    hydrocortisone (CORTEF) half-tab 7.5 mg  7.5 mg Oral or Feeding Tube Daily Patrick Hernandez MD   7.5 mg at 09/16/24 1348    hydrocortisone (CORTEF) tablet 15 mg  15 mg Oral or Feeding Tube QA Kane Mars MD   15 mg at 09/17/24 0826    levothyroxine (SYNTHROID/LEVOTHROID) tablet 100 mcg  100 mcg Oral or Feeding Tube Daily Kane Mars MD   100 mcg at 09/17/24 0828    metoclopramide (REGLAN) solution 10 mg  10 mg Oral or Feeding Tube 4x Daily AC & HS Kane Mars MD   10 mg at 09/17/24 0630    pantoprazole (PROTONIX) 2 mg/mL suspension 40 mg  40 mg Per Feeding Tube BID Kane Mars MD   40 mg at 09/17/24 0829    piperacillin-tazobactam (ZOSYN) 3.375 g vial to attach to  mL bag  3.375 g Intravenous Q6H Kane Mars MD   3.375 g at 09/17/24 0630     polyethylene glycol (MIRALAX) Packet 17 g  17 g Oral or Feeding Tube Daily Patrick Hernandez MD   17 g at 09/17/24 0828    potassium & sodium phosphates (NEUTRA-PHOS) Packet 1 packet  1 packet Oral or Feeding Tube Daily Kane Mars MD   1 packet at 09/17/24 0828    sodium chloride (PF) 0.9% PF flush 3 mL  3 mL Intracatheter Q8H Patrick Hernandez MD   3 mL at 09/15/24 1305       Data   Recent Labs   Lab 09/17/24  0712 09/16/24  0850 09/15/24  0158 09/13/24 1953 09/13/24 1953   WBC  --  7.7 14.9*  --  9.7   HGB  --  13.7 14.3  --  14.3   MCV  --  89 87  --  87   PLT  --  137* 182  --  203    148* 136  --  140   POTASSIUM 3.5 3.8 4.3  --  4.2   CHLORIDE 109* 115* 100  --  104   CO2 26 23 27  --  26   BUN 9.8 11.7 20.8  --  24.1*   CR 0.76 0.75 0.85  0.85  --  0.85   ANIONGAP 8 10 9  --  10   STEVE 7.8* 8.2* 8.7*  --  8.8   * 108* 117*  --  114*   ALBUMIN 3.3* 3.6 4.0   < >  --    PROTTOTAL 6.1* 7.0 8.0   < >  --    BILITOTAL 0.2 0.3 0.4   < >  --    ALKPHOS 92 118 169*   < >  --    ALT 33 50 91*   < >  --    AST 33 53* 138*   < >  --     < > = values in this interval not displayed.       No results found for this or any previous visit (from the past 24 hour(s)).

## 2024-09-18 ENCOUNTER — MEDICAL CORRESPONDENCE (OUTPATIENT)
Dept: HEALTH INFORMATION MANAGEMENT | Facility: CLINIC | Age: 64
End: 2024-09-18

## 2024-09-18 VITALS
OXYGEN SATURATION: 92 % | BODY MASS INDEX: 21.54 KG/M2 | HEART RATE: 86 BPM | SYSTOLIC BLOOD PRESSURE: 115 MMHG | WEIGHT: 150.13 LBS | TEMPERATURE: 98 F | RESPIRATION RATE: 18 BRPM | DIASTOLIC BLOOD PRESSURE: 56 MMHG

## 2024-09-18 LAB
ANION GAP SERPL CALCULATED.3IONS-SCNC: 9 MMOL/L (ref 7–15)
BUN SERPL-MCNC: 9.9 MG/DL (ref 8–23)
CALCIUM SERPL-MCNC: 8.2 MG/DL (ref 8.8–10.4)
CHLORIDE SERPL-SCNC: 103 MMOL/L (ref 98–107)
CREAT SERPL-MCNC: 0.71 MG/DL (ref 0.67–1.17)
EGFRCR SERPLBLD CKD-EPI 2021: >90 ML/MIN/1.73M2
GLUCOSE SERPL-MCNC: 147 MG/DL (ref 70–99)
HCO3 SERPL-SCNC: 29 MMOL/L (ref 22–29)
MAGNESIUM SERPL-MCNC: 2 MG/DL (ref 1.7–2.3)
PHOSPHATE SERPL-MCNC: 2.8 MG/DL (ref 2.5–4.5)
PLATELET # BLD AUTO: 168 10E3/UL (ref 150–450)
POTASSIUM SERPL-SCNC: 3.2 MMOL/L (ref 3.4–5.3)
SODIUM SERPL-SCNC: 141 MMOL/L (ref 135–145)

## 2024-09-18 PROCEDURE — 99239 HOSP IP/OBS DSCHRG MGMT >30: CPT | Performed by: STUDENT IN AN ORGANIZED HEALTH CARE EDUCATION/TRAINING PROGRAM

## 2024-09-18 PROCEDURE — 94640 AIRWAY INHALATION TREATMENT: CPT

## 2024-09-18 PROCEDURE — 250N000013 HC RX MED GY IP 250 OP 250 PS 637: Performed by: INTERNAL MEDICINE

## 2024-09-18 PROCEDURE — 84100 ASSAY OF PHOSPHORUS: CPT | Performed by: INTERNAL MEDICINE

## 2024-09-18 PROCEDURE — 82374 ASSAY BLOOD CARBON DIOXIDE: CPT | Performed by: INTERNAL MEDICINE

## 2024-09-18 PROCEDURE — 250N000009 HC RX 250: Performed by: INTERNAL MEDICINE

## 2024-09-18 PROCEDURE — 83735 ASSAY OF MAGNESIUM: CPT | Performed by: INTERNAL MEDICINE

## 2024-09-18 PROCEDURE — 85049 AUTOMATED PLATELET COUNT: CPT | Performed by: HOSPITALIST

## 2024-09-18 PROCEDURE — 36415 COLL VENOUS BLD VENIPUNCTURE: CPT | Performed by: INTERNAL MEDICINE

## 2024-09-18 PROCEDURE — 250N000013 HC RX MED GY IP 250 OP 250 PS 637: Performed by: HOSPITALIST

## 2024-09-18 PROCEDURE — 999N000157 HC STATISTIC RCP TIME EA 10 MIN

## 2024-09-18 RX ORDER — CARBAMAZEPINE 100 MG/5ML
100 SUSPENSION ORAL DAILY
Qty: 450 ML | Refills: 0 | Status: SHIPPED | OUTPATIENT
Start: 2024-09-18 | End: 2024-09-26

## 2024-09-18 RX ORDER — AMOXICILLIN AND CLAVULANATE POTASSIUM 600; 42.9 MG/5ML; MG/5ML
875 POWDER, FOR SUSPENSION ORAL 2 TIMES DAILY
DISCHARGE
Start: 2024-09-18 | End: 2024-09-21

## 2024-09-18 RX ORDER — CARBAMAZEPINE 100 MG/5ML
150 SUSPENSION ORAL 3 TIMES DAILY
Qty: 450 ML | Refills: 0 | Status: SHIPPED | OUTPATIENT
Start: 2024-09-18 | End: 2024-09-26

## 2024-09-18 RX ORDER — CARBAMAZEPINE 100 MG/5ML
100 SUSPENSION ORAL DAILY
Qty: 450 ML | Refills: 0 | DISCHARGE
Start: 2024-09-18 | End: 2024-09-18

## 2024-09-18 RX ADMIN — CARBAMAZEPINE 150 MG: 100 SUSPENSION ORAL at 06:42

## 2024-09-18 RX ADMIN — POTASSIUM & SODIUM PHOSPHATES POWDER PACK 280-160-250 MG 1 PACKET: 280-160-250 PACK at 08:32

## 2024-09-18 RX ADMIN — ALBUTEROL SULFATE 2.5 MG: 2.5 SOLUTION RESPIRATORY (INHALATION) at 08:20

## 2024-09-18 RX ADMIN — ACETYLCYSTEINE 2 ML: 200 SOLUTION ORAL; RESPIRATORY (INHALATION) at 08:20

## 2024-09-18 RX ADMIN — POLYETHYLENE GLYCOL 3350 17 G: 17 POWDER, FOR SOLUTION ORAL at 08:32

## 2024-09-18 RX ADMIN — AMOXICILLIN AND CLAVULANATE POTASSIUM 875 MG: 400; 57 POWDER, FOR SUSPENSION ORAL at 08:32

## 2024-09-18 RX ADMIN — BRIVARACETAM 100 MG: 10 SOLUTION ORAL at 09:12

## 2024-09-18 RX ADMIN — METOCLOPRAMIDE HYDROCHLORIDE 10 MG: 5 SOLUTION ORAL at 06:42

## 2024-09-18 RX ADMIN — Medication 40 MG: at 08:32

## 2024-09-18 RX ADMIN — LEVOTHYROXINE SODIUM 100 MCG: 100 TABLET ORAL at 08:32

## 2024-09-18 RX ADMIN — HYDROCORTISONE 15 MG: 5 TABLET ORAL at 08:32

## 2024-09-18 RX ADMIN — GLYCOPYRROLATE 2 MG: 1 TABLET ORAL at 08:32

## 2024-09-18 ASSESSMENT — ACTIVITIES OF DAILY LIVING (ADL)
ADLS_ACUITY_SCORE: 61
ADLS_ACUITY_SCORE: 57
ADLS_ACUITY_SCORE: 61
ADLS_ACUITY_SCORE: 61
ADLS_ACUITY_SCORE: 57
ADLS_ACUITY_SCORE: 61
ADLS_ACUITY_SCORE: 57
ADLS_ACUITY_SCORE: 61
ADLS_ACUITY_SCORE: 57

## 2024-09-18 NOTE — PROGRESS NOTES
Care Management Discharge Note    Discharge Date: 09/18/2024       Discharge Disposition: Home  Discharge Services:  Resume Home Care    Discharge DME: None    Discharge Transportation: agency, health plan transportation    Private pay costs discussed: Not applicable    Does the patient's insurance plan have a 3 day qualifying hospital stay waiver?  Yes     Which insurance plan 3 day waiver is available? [employer]    Will the waiver be used for post-acute placement? No    PAS Confirmation Code:  NA  Patient/family educated on Medicare website which has current facility and service quality ratings:  NA    Education Provided on the Discharge Plan:  yes  Persons Notified of Discharge Plans: patient, patient's Mother Savannah and Nsg  Patient/Family in Agreement with the Plan: yes    Handoff Referral Completed: Yes, JOSÉ MIGUEL PCP: Internal handoff referral completed    Additional Information:  Patient is discharging home today.    Transportation is arranged via [employer] stretcher with  time of [id number] AM.  PCS has been faxed.  A virtual PCP follow up has been scheduled on 9/26/24  Have updated patient's mother.  Nsg will call Savannah and go over the Discharge instructions.  Home care SN will be resumed and have confirmed this with Intake at [facility]. ([phone]).     Karen Collins RN  Inpatient Care Management  [phone]

## 2024-09-18 NOTE — DISCHARGE SUMMARY
Discharge    Patient discharged to home via [facility] stretcher with EMS    Listed belongings gathered and given to patient (including from security/pharmacy). Yes  Care Plan and Patient education resolved: Yes  Prescriptions if needed, hard copies sent with patient  Yes  Medication Bin checked and emptied on discharge Yes  SW/care coordinator/charge RN aware of discharge: Yes     Summary: Aspiration pneumonia of both lungs.  R sided hemiplegia, non-verbal     DATE & TIME: 9/18/24, 0215-0451  Cognitive Concerns/ Orientation: HECTOR nonverbal.   BEHAVIOR & AGGRESSION TOOL COLOR: Green  ABNL VS/O2: VSS, on RA  MOBILITY: Assist of 2 w/ lift, T&R  PAIN MANAGMENT: Appears comfortable  DIET: NPO, tube feed running continuously at 60mL/HR w/ intermittent flushes 12O mL q4h  BOWEL/BLADDER: Incontinent, external cath   ABNL LAB/BG: K 3.2  DRAIN/DEVICES: GJ tube, PIV SL intermittent abx  TELEMETRY RHYTHM: n/a  SKIN:  R arm abrasion, R knee scab, blanchable redness to sacrum coccyx mepilex in place  TESTS/PROCEDURES: None  D/C DATE/GOALS/PLACE: Discharge to home today via stretcher ride set up w/ [facility].  OTHER IMPORTANT INFO: On contact for MRSA & VRE. Refusing oral cares all day.

## 2024-09-18 NOTE — PROGRESS NOTES
Orientation: A/O xto self, contact iso for MRSA and VRE    Vitals/Tele: VSS on RA ex continue O2 sat    IV Access/drains: No IV access     Diet: NPO, ex continue J tube  feed running at 60ml/hr with q4h 120 ml flushes.     Mobility: A x 2, lift, T/R    GI/: Incontinent of B/B, BM x 1 this shift    Wound/Skin: Blanchable  redness to coccyx/sacrum, mepilex in place. R arm abrasion and R knee scab    Consults:     Discharge Plan: Pending possible discharge today    Overall Patient Progress: improving  See Flow sheets for assessment

## 2024-09-18 NOTE — DISCHARGE SUMMARY
[facility]  Hospitalist Discharge Summary      Date of Admission:  9/15/2024  Date of Discharge:  9/18/2024  Discharging Provider: Ricky Torres MD  Discharge Service: Hospitalist Service    Discharge Diagnoses     Aspiration pneumonitis/pneumonia  Acute hypoxic respiratory failure: Resolved  Sepsis secondary to aspiration pneumonia: Present on admission  Chronic dysphagia status post PEG tube placement    Clinically Significant Risk Factors          Follow-ups Needed After Discharge   Follow-up Appointments     Follow-up and recommended labs and tests       Follow up with primary care provider, Elsa Queen, within 7 days for   hospital follow- up.  No follow up labs or test are needed.  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  And subsequently get admitted        Assessment & Plan  Aspiration pneumonitis/pneumonia  Acute hypoxic respiratory failure: Resolved  Sepsis secondary to aspiration pneumonia: Present on admission  Chronic dysphagia status post PEG tube placement        Patient presented to hospital with fevers after an episode of emesis on September 14.   He was tachycardic, have leukocytosis, and hypoxic.  Low-grade temp of 99.8 at time of admission, consistent with sepsis  CT chest PE abdominal pelvis  was done on admission, show aspiration pneumonitis in the lower lobe right more than left.  On admission he was requiring 10 L of supplemental oxygen through OxyMask.  Has bilateral rhonchi and crackles positive.  He was admitted, started on IV fluids, kept n.p.o., started on IV Zosyn  Blood culture obtained and no growth so far.  He responded to the treatment very well, next day of admission he was on room air.  Breathing more comfortably at this time -> now ON RA  Stop IV fluids now.  As he is tolerating tube feeding well.  Overall improve, leukocytosis resolved, hypoxia resolved currently on room air  Continue with IV Zosyn ->switch to oral Augmentinfor 3 more days to complete 5-day course at discharge.     Mildly elevated LFTs: Likely secondary sepsis: Resolved  On admission alkaline phosphatase elevated 169, ALT 91,   Total bilirubin normal.  This is likely secondary to sepsis and infection.  LFTs now normal.  CT abdomen pelvis no acute issue with the liver, normal gallbladder and bile ducts.     History of TBI  Spastic hemiaplagia and right sided hemiparesis  Nonverbal  Hx of seizures  The patient is nonverbal at baseline. He is fully dependent on his mother for cares.  -Resume PTA medication, including by brivaracetam, carbamazepine     Panhypopituitarism  Adrenal insufficiency  Hypothyroidism  Status post 100 mg of hydrocortisone in the ER for stress dosing.  Patient was started on stress dose of hydrocortisone 100 mg every 6 hour.  Patient is hemodynamically stable at this time.  Stopped IV hydrocortisone and started him on oral PTA   PTA doses of hydrocortisone through his PEG tube on 9/16/2024 -> resumed     GERD.    -Continue with the Protonix.     Pancreatic cyst lesion  Status post EUS July 2024.  Cytology negative for malignancy.     Hypernatremia  Hypophosphatemia  Mild now resolved, discontinue IV fluids now  Replaced phosphorus as per replacement protocol.          Consultations This  Hospital Stay   CARE MANAGEMENT / SOCIAL WORK IP CONSULT  NUTRITION SERVICES ADULT IP CONSULT  PHARMACY IP CONSULT    Code Status   Full Code    Time Spent on this Encounter   I, Ricky Torres MD, personally saw the patient today and spent greater than 30 minutes discharging this patient.       Ricky Torres MD  [place] [address] [facility]  [address] [place] [address]  Phone: [phone]  ______________________________________________________________________    Physical Exam   Vital Signs: Temp: 98  F (36.7  C) Temp src: Axillary BP: 115/56 Pulse: 86   Resp: 18 SpO2: 92 % O2 Device: None (Room air)    Weight: 150 lbs 2.13 oz  ----------------------------------------------------------------------------------------       Primary Care Physician   Elsa Queen    Discharge Orders      Medication Therapy Management Referral      Primary Care - Care Coordination Referral      Reason for your hospital stay    You had pneumonia needing antibiotics.     Follow-up and recommended labs and tests     Follow up with primary care provider, Elsa Queen, within 7 days for hospital follow- up.  No follow up labs or test are needed.  See below for VIRTUAL appointment that has been scheduled for you on 9/26/24     Activity    Your activity upon discharge: activity as tolerated     Resume Home Care Services     Diet    Follow this diet upon discharge: Current Diet:Orders Placed This Encounter      Adult Formula Drip Feeding: Continuous Jevity 1.5; Jejunostomy; Goal Rate: 60 x 22 hours (hold 1 hr before and 1 hr after synthroid); mL/hr; start TF @ 45 ml/hr. advance to goal after 4 hours if tolerated.      NPO: no exceptions    Free water order:  Free water route: Duodenum/Jejunum   Free water - Feeding Tube Flush Frequency: 120 ml r3coeep       Significant Results and Procedures   Most Recent 3 CBC's:  Recent Labs   Lab Test 09/18/24  0717 09/16/24  0850 09/15/24  0158 09/13/24 1953   WBC  --  7.7  14.9* 9.7   HGB  --  13.7 14.3 14.3   MCV  --  89 87 87    137* 182 203     Most Recent 3 BMP's:  Recent Labs   Lab Test 09/18/24  0717 09/17/24  0712 09/16/24  0850    143 148*   POTASSIUM 3.2* 3.5 3.8   CHLORIDE 103 109* 115*   CO2 29 26 23   BUN 9.9 9.8 11.7   CR 0.71 0.76 0.75   ANIONGAP 9 8 10   STEVE 8.2* 7.8* 8.2*   * 118* 108*     Most Recent 2 LFT's:  Recent Labs   Lab Test 09/17/24  0712 09/16/24  0850   AST 33 53*   ALT 33 50   ALKPHOS 92 118   BILITOTAL 0.2 0.3     Most Recent 3 INR's:  Recent Labs   Lab Test 09/25/23  1840 07/05/22  0035 06/16/22  2248   INR 1.17* 1.15 1.22*     Most Recent 3 Troponin's:  Recent Labs   Lab Test 04/15/21  2250 02/19/21  1123 10/25/20  2100   TROPI <0.015 <0.015 0.047*     Most Recent 3 BNP's:  Recent Labs   Lab Test 09/14/23  1813 09/09/23  0408 04/06/23  2236   NTBNPI 3,143* 148 63     Most Recent D-dimer:  Recent Labs   Lab Test 07/24/24  0324   DD 0.67*     Most Recent Cholesterol Panel:  Recent Labs   Lab Test 04/28/24  0419   CHOL 118   LDL 67   HDL 26*   TRIG 123     7-Day Micro Results       Collected Updated Procedure Result Status      09/15/2024 0336 09/18/2024 1101 Blood Culture Peripheral Blood [[id number]]   Peripheral Blood    Preliminary result Component Value   Culture No growth after 3 days  [P]                      Most Recent TSH and T4:  Recent Labs   Lab Test 08/05/24  1545 07/01/24  1049   TSH <0.01* <0.01*   T4  --  1.26     Most Recent Urinalysis:  Recent Labs   Lab Test 09/07/24  1830 12/26/17  1105 10/03/17  2224   COLOR Yellow   < > Yellow   APPEARANCE Clear   < > Clear   URINEGLC Negative   < > Negative   URINEBILI Negative   < > Negative   URINEKETONE Negative   < > Negative   SG 1.024   < > 1.015   UBLD Negative   < > Negative   URINEPH 8.0*   < > 7.5*   PROTEIN 20*   < > 30*   UROBILINOGEN  --   --  0.2   NITRITE Negative   < > Negative   LEUKEST Negative   < > Negative   RBCU <1   < > O - 2   WBCU <1   < > O - 2    < >  = values in this interval not displayed.     Most Recent ESR & CRP:  Recent Labs   Lab Test 01/25/24  0844 09/09/23  0408 11/21/22  1213   CRP  --   --  116.0*   CRPI 16.12*   < >  --     < > = values in this interval not displayed.   ,   Results for orders placed or performed during the hospital encounter of 09/15/24   XR Chest Port 1 View    Narrative    EXAM: XR CHEST PORT 1 VIEW  LOCATION: [facility]  DATE: 9/15/2024    INDICATION: cough  COMPARISON: 09/13/2024.      Impression    IMPRESSION: Stable elevated right hemidiaphragm. Mild bibasilar atelectasis and/or scarring. No focal pulmonary consolidation otherwise, or significant pleural effusion. Normal heart size without pulmonary vascular congestion. No pneumothorax. No acute   osseous abnormality.   CT Chest PE Abdomen Pelvis w Contrast    Narrative    EXAM: CT CHEST PE ABDOMEN PELVIS W CONTRAST  LOCATION: [facility]  DATE: 9/15/2024    INDICATION: Fever of unknown source, cough, intermittent hypoxia, tacyhcardia, history of DVT, non verbal at baseline.  COMPARISON: Chest radiographs 9/15/2024 and 9/13/2024. CT of the chest, abdomen, and pelvis 8/23/2024.  TECHNIQUE: CT chest pulmonary angiogram and routine CT abdomen pelvis with IV contrast. Arterial phase through the chest and venous phase through the abdomen and pelvis. Multiplanar reformats and MIP reconstructions were performed. Dose reduction   techniques were used.   CONTRAST: 74 ml Isovue 370    FINDINGS:  ANGIOGRAM CHEST: No pulmonary embolus reliably visualized. Segmental and subsegmental pulmonary arteries are not well assessed due to contrast bolus timing. No aortic aneurysm or dissection.    LUNGS AND PLEURA: Patchy dependent consolidation in both lower lobes, right more than left. Aspirated material is present within the airways of the right lung including right mainstem bronchus.    MEDIASTINUM/AXILLAE: Normal.    CORONARY ARTERY  CALCIFICATION: Mild.    HEPATOBILIARY: Small benign left hepatic cyst. Normal gallbladder and bile ducts.    PANCREAS: Stable 3 cm cystic lesion of the pancreatic tail (image 62 of axial series 12).    SPLEEN: Normal.    ADRENAL GLANDS: Normal.    KIDNEYS/BLADDER: Normal.    BOWEL: Percutaneous gastrojejunostomy tube in satisfactory position. Rectum is moderately distended with stool. Colonic diverticulosis. No evidence for diverticulitis. No bowel obstruction.    LYMPH NODES: Normal.    VASCULATURE: Normal.    PELVIC ORGANS: Normal.    MUSCULOSKELETAL: Demineralized skeleton. Mild chronic appearing loss of height of multiple thoracolumbar vertebral bodies.      Impression    IMPRESSION:  1.  Aspiration pneumonitis in the lower lobes, right more than left.  2.  Rectum moderately distended with stool.  3.  Colonic diverticulosis.  4.  Stable 3 cm cystic lesion of the pancreatic tail warrants nonemergent further evaluation by MR with gadolinium.     *Note: Due to a large number of results and/or encounters for the requested time period, some results have not been displayed. A complete set of results can be found in Results Review.       Discharge Medications   Current Discharge Medication List        CONTINUE these medications which have CHANGED    Details   amoxicillin-clavulanate (AUGMENTIN ES-600) 600-42.9 MG/5ML suspension Place 7.29 mLs (875 mg) into Feeding Tube 2 times daily for 3 days.    Associated Diagnoses: Aspiration pneumonia of both lungs, unspecified aspiration pneumonia type, unspecified part of lung (H)      !! carBAMazepine (TEGRETOL) 100 MG/5ML suspension Take 7.5 mLs (150 mg) by mouth or Feeding Tube 3 times daily.  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 carBAMazepine (TEGRETOL) 100 MG/5ML suspension Place 5 mLs (100 mg) into Feeding Tube daily.  Qty: 450 mL, Refills: 0    Associated Diagnoses: Intractable  epilepsy due to external causes with status epilepticus (H)       !! - Potential duplicate medications found. Please discuss with provider.        CONTINUE these medications which have NOT CHANGED    Details   acetaminophen (TYLENOL) 325 MG tablet Take 650 mg by mouth every 6 hours as needed for mild pain      acetylcysteine (MUCOMYST) 20 % neb solution Take 2 mLs by nebulization 4 times daily (To use along with albuterol nebs)  Qty: 10 mL, Refills: 0    Associated Diagnoses: Aspiration pneumonia of right middle lobe, unspecified aspiration pneumonia type (H); Aspiration pneumonitis (H)      albuterol (PROVENTIL) (5 MG/ML) 0.5% neb solution Take 0.5 mLs (2.5 mg) by nebulization every 6 hours as needed for shortness of breath, wheezing or cough 0700 1100 1500 1900 with mucomyst  Qty: 240 mL, Refills: 3    Associated Diagnoses: Aspiration pneumonitis (H)      bacitracin 500 UNIT/GM external ointment Apply topically daily as needed for wound care To PEG site.  Qty: 30 g, Refills: 3    Associated Diagnoses: G tube feedings (H)      Brivaracetam (BRIVIACT) 10 MG/ML solution Take 100 mg by mouth or Feeding Tube 2 times daily 0900, 2100      COMPOUNDED NON-CONTROLLED SUBSTANCE (CMPD RX) - PHARMACY TO MIX COMPOUNDED MEDICATION Scopolamine 0.4mg capsules - take  2 capsule by feeding tube three times daily as needed  Qty: 90 capsule, Refills: 3    Associated Diagnoses: Excessive oral secretions      Cyanocobalamin (VITAMIN B-12 PO) 1,000 mcg by Per Feeding Tube route daily      glycopyrrolate (ROBINUL) 1 MG tablet Take 2 tablets (2 mg) by mouth 2 times daily. TAKE 1 TABLET(1 MG) BY MOUTH TWICE DAILY AS NEEDED FOR SECRETIONS  Qty: 180 tablet, Refills: 3    Associated Diagnoses: Excessive oral secretions      guaiFENesin (MUCINEX) 600 MG 12 hr tablet Take 1 tablet (600 mg) by mouth 2 times daily as needed for congestion  Qty: 60 tablet, Refills: 0    Associated Diagnoses: Aspiration pneumonitis (H)      hydrocortisone (CORTAID)  1 % external cream Apply topically 2 times daily as needed Apply to reddened memo areas as needed      hydrocortisone (CORTEF) 5 MG tablet Take 15 mg (3 tablets) in the morning and 7.5 mg (1.5 tablet)  at 2:00 PM. During illness patient takes more as a stress dose. Please increase the dose as directed.  Qty: 400 tablet, Refills: 3    Associated Diagnoses: Secondary adrenal insufficiency (H24)      levothyroxine (SYNTHROID/LEVOTHROID) 100 MCG tablet Take 1 tablet (100 mcg) by mouth daily  Qty: 90 tablet, Refills: 0    Associated Diagnoses: Hypothyroidism, unspecified type      metoclopramide (REGLAN) 10 MG/10ML SOLN solution TAKE 10 ML BY MOUTH FOUR TIMES DAILY(BEFORE MEALS AND NIGHTLY) AT 8 AM, 12 PM, 4 PM, AND 8 PM.  Qty: 2365 mL, Refills: 5    Associated Diagnoses: Aspiration pneumonitis (H)      miconazole (MICATIN) 2 % external powder Apply topically 2 times daily as needed    Associated Diagnoses: Recurrent pneumonia      multivitamin, therapeutic (THERA-VIT) TABS tablet 1 tablet by Per Feeding Tube route daily      mupirocin (BACTROBAN) 2 % external ointment APPLY TOPICALLY TO THE AFFECTED AREA TWICE DAILY AS NEEDED  Qty: 30 g, Refills: 1    Associated Diagnoses: Panhypopituitarism (H24); Hypogonadism male      Nutritional Supplements (BOOST HIGH PROTEIN) LIQD       pantoprazole (PROTONIX) 2 mg/mL SUSP suspension Place 20 mLs (40 mg) into Feeding Tube 2 times daily  Qty: 600 mL, Refills: 3    Associated Diagnoses: Gastroesophageal reflux disease, unspecified whether esophagitis present      polyethylene glycol (MIRALAX) 17 GM/Dose powder Take 17 g by mouth daily.  Qty: 510 g, Refills: 0    Associated Diagnoses: Constipation, unspecified constipation type      potassium & sodium phosphates (NEUTRA-PHOS) 280-160-250 MG Packet Take 1 packet by mouth daily  Qty: 100 each, Refills: 3    Associated Diagnoses: Other feeding difficulties; Malnutrition, unspecified type (H24)      sennosides (SENOKOT) 8.6 MG tablet  "Take 1 tablet by mouth 2 times daily as needed for constipation    Associated Diagnoses: Aspiration pneumonia of right middle lobe, unspecified aspiration pneumonia type (H)      sodium chloride 1 GM tablet Place 1 tablet (1 g) into Feeding Tube 2 times daily  Qty: 60 tablet, Refills: 0    Associated Diagnoses: Hyponatremia      testosterone cypionate (DEPOTESTOSTERONE) 200 MG/ML injection INJECT 0.25ML IN THE MUSCLE ONCE A WEEK. Please schedule follow up for further refills.  Qty: 4 mL, Refills: 0    Associated Diagnoses: Panhypopituitarism (H24); Hypogonadism male      vitamin C (ASCORBIC ACID) 1000 MG TABS 3,000 mg by Oral or Feeding Tube route daily      vitamin D3 (CHOLECALCIFEROL) 2000 units (50 mcg) tablet 4,000 Units by Per Feeding Tube route daily      insulin syringe-needle U-100 (29G X 1/2\" 1 ML) 29G X 1/2\" 1 ML miscellaneous Syringe needle use as needed  Qty: 200 each, Refills: 11    Associated Diagnoses: Panhypopituitarism (H24)           Allergies   Allergies   Allergen Reactions    Valproic Acid Other (See Comments)     Toxicity w/ bone marrow suspension, elevated ammonia levels    Toxicity with bone marrow suspension   Elevated ammonia levels    Poisens system    Scopolamine Hives     Hives with the patch - oral no problem    Vancomycin Other (See Comments)     Vancomycin flushing syndrome - pt tolerated dose at half rate.    Phenytoin Sodium      "

## 2024-09-19 ENCOUNTER — PATIENT OUTREACH (OUTPATIENT)
Dept: CARE COORDINATION | Facility: CLINIC | Age: 64
End: 2024-09-19
Payer: MEDICARE

## 2024-09-19 NOTE — PROGRESS NOTES
"Clinic Care Coordination Contact  Transitions of Care Outreach  Chief Complaint   Patient presents with    Clinic Care Coordination - Post Hospital    Clinic Care Coordination - Initial    Clinic Care Coordination - Homecare/TCU     Most Recent Admission Date: 9/15/2024   Most Recent Admission Diagnosis: Aspiration pneumonia of both lungs, unspecified aspiration pneumonia type, unspecified part of lung (H) - J69.0  Sepsis, due to unspecified organism, unspecified whether acute organ dysfunction present (H) - A41.9   Most Recent Discharge Date: 9/18/2024   Most Recent Discharge Diagnosis: Aspiration pneumonia of both lungs, unspecified aspiration pneumonia type, unspecified part of lung (H) - J69.0  Sepsis, due to unspecified organism, unspecified whether acute organ dysfunction present (H) - A41.9  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 He is doing really well, I just got to keep him doing well\"  How are your symptoms? (Red Flag symptoms escalate to triage hotline per guidelines): Improved  Do you know how to contact your clinic care team if you have future questions or changes to your health status? : Yes  Does the patient have their discharge instructions? : Yes  Does the patient have questions regarding their discharge instructions? : No  Were you started on any new medications or were there changes to any of your previous medications? : Yes  Does the patient have all of their medications?: Yes  Do you have questions regarding any of your medications? : No  Do you have all " of your needed medical supplies or equipment (DME)?  (i.e. oxygen tank, CPAP, cane, etc.): Yes         Post-op (Clinicians Only)  Did the patient have surgery or a procedure: No    Patient's mother/caregiver/guardian, Savannah states patient's breathing is doing fine. She shares that instead of giving patient 5.5 containers of - 8 oz size, she is giving patient 5 containers of - 11 oz size of [employer] brand formula. Running the rate at 70 ml/hour for approximately 20 hours per day to give his stomach a break. Works with dietician who supplies formula and also followed by GI for management of nutritional status.   She shares that she got the van back now so she can drive patient to his appointments.   She explains that patient recently aspirated leading to hospital admission due to throwing up and not being able to get him pulled up in time.   RNCC discussed and reviewed aspiration precautions, reinforced head of bed to be elevated 45 degrees.   Savannah states she has not yet heard from home care. RNCC provided home care contact information.     Follow up Plan   Discharge Follow-Up  Discharge follow up appointment scheduled in alignment with recommended follow up timeframe or Transitions of Risk Category? (Low = within 30 days; Moderate= within 14 days; High= within 7 days): Yes  Discharge Follow Up Appointment Date: 09/26/24  Discharge Follow Up Appointment Scheduled with?: Primary Care Provider    Future Appointments   Date Time Provider Department Center   9/26/2024  9:30 AM Elsa Queen MD [facility]    9/26/2024 12:15 PM Juana Tierney PA-C [facility]   10/7/2024  1:00 PM CS [facility]    12/18/2024 12:30 PM Mark Huntley MD [facility]    1/6/2025 11:00 AM Shea Bojorquez RD [facility]     Outpatient Plan as outlined on AVS reviewed with patient.    For any urgent concerns, please contact our 24 hour nurse triage line: [phone] ([phone])       Mariana Fernando,  RN

## 2024-09-20 ENCOUNTER — TELEPHONE (OUTPATIENT)
Dept: FAMILY MEDICINE | Facility: CLINIC | Age: 64
End: 2024-09-20
Payer: MEDICARE

## 2024-09-20 ENCOUNTER — DOCUMENTATION ONLY (OUTPATIENT)
Dept: GASTROENTEROLOGY | Facility: CLINIC | Age: 64
End: 2024-09-20
Payer: MEDICARE

## 2024-09-20 LAB — BACTERIA BLD CULT: NO GROWTH

## 2024-09-20 NOTE — TELEPHONE ENCOUNTER
MT referral from: Transitions of Care (recent hospital discharge or ED visit)    [facility] referral outreach attempt #2 on September 20, 2024 at 12:10 PM      Outcome: Spoke with patient mon stated already spoke Naveed    Use symone for the carrier/Plan on the flowsheet          Sonam Salas [facility]   [phone]

## 2024-09-20 NOTE — PROGRESS NOTES
Hello,     This message is to remind you of your upcoming appointment with the GI clinic, on Sept 26,2024 @ 12:15 p.m with Juana Tierney PA-C. This appointment is scheduled as an in-person appt. Please arrive 15 minutes early to check in for your appointment.   You must be in [place] at the time of your visit if your appointment is virtual or over the telephone. Please respond to this message to confirm your appointment.  If you are unable to attend this appointment, please call [phone] to reschedule.     Annie Ace MA

## 2024-09-25 ENCOUNTER — MEDICAL CORRESPONDENCE (OUTPATIENT)
Dept: HEALTH INFORMATION MANAGEMENT | Facility: CLINIC | Age: 64
End: 2024-09-25

## 2024-09-26 ENCOUNTER — VIRTUAL VISIT (OUTPATIENT)
Dept: FAMILY MEDICINE | Facility: CLINIC | Age: 64
End: 2024-09-26
Payer: MEDICARE

## 2024-09-26 DIAGNOSIS — G40.803 INTRACTABLE EPILEPSY DUE TO EXTERNAL CAUSES WITH STATUS EPILEPTICUS (H): ICD-10-CM

## 2024-09-26 DIAGNOSIS — E23.0 PANHYPOPITUITARISM (H): ICD-10-CM

## 2024-09-26 DIAGNOSIS — Z09 HOSPITAL DISCHARGE FOLLOW-UP: Primary | ICD-10-CM

## 2024-09-26 DIAGNOSIS — E29.1 HYPOGONADISM MALE: ICD-10-CM

## 2024-09-26 DIAGNOSIS — J69.0 ASPIRATION PNEUMONIA OF BOTH LUNGS, UNSPECIFIED ASPIRATION PNEUMONIA TYPE, UNSPECIFIED PART OF LUNG (H): ICD-10-CM

## 2024-09-26 PROCEDURE — 99495 TRANSJ CARE MGMT MOD F2F 14D: CPT | Mod: 95 | Performed by: NURSE PRACTITIONER

## 2024-09-26 RX ORDER — CARBAMAZEPINE 100 MG/5ML
150 SUSPENSION ORAL 3 TIMES DAILY
Qty: 450 ML | Refills: 2 | Status: SHIPPED | OUTPATIENT
Start: 2024-09-26

## 2024-09-26 RX ORDER — TESTOSTERONE CYPIONATE 200 MG/ML
INJECTION, SOLUTION INTRAMUSCULAR
Qty: 4 ML | Refills: 0 | Status: SHIPPED | OUTPATIENT
Start: 2024-09-26

## 2024-09-26 RX ORDER — CARBAMAZEPINE 100 MG/5ML
100 SUSPENSION ORAL DAILY
Qty: 450 ML | Refills: 2 | Status: SHIPPED | OUTPATIENT
Start: 2024-09-26

## 2024-09-26 NOTE — PROGRESS NOTES
Keyon is a 64 year old who is being evaluated via a billable video visit.    How would you like to obtain your AVS? MyChart  If the video visit is dropped, the invitation should be resent by: Text to cell phone: [phone]  Will anyone else be joining your video visit? No      Assessment & Plan     Hospital discharge follow-up  Doing much better after hospitalization for pneumonia. Stable on room air. No concerns today. 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  - carBAMazepine (TEGRETOL) 100 MG/5ML suspension; Place 5 mLs (100 mg) into Feeding Tube daily.    Panhypopituitarism (H24)  - testosterone cypionate (DEPOTESTOSTERONE) 200 MG/ML injection; INJECT 0.25ML IN THE MUSCLE ONCE A WEEK.    Hypogonadism male  Refilled. Due for labs. This is ordered   - testosterone cypionate (DEPOTESTOSTERONE) 200 MG/ML injection; INJECT 0.25ML IN THE MUSCLE ONCE A WEEK.  - Testosterone, total; Future        MED REC REQUIRED  Post Medication Reconciliation Status: discharge medications reconciled, continue medications without change        Subjective   Keyon is a 64 year old, presenting for the following health issues:  No chief complaint on file.      Video Start Time: 2:57 PM    [facility]        Hospital Follow-up Visit:    Hospital/Nursing Home/ Rehab Facility: [facility]  Date of Admission: 9/15/2024  Date of Discharge: 9/18/2024  Reason(s) for Admission:     Aspiration pneumonitis/pneumonia  Acute hypoxic respiratory failure: Resolved  Sepsis secondary to aspiration pneumonia: Present on admission  Chronic dysphagia status post PEG tube placement       Was the patient in the ICU or did the patient  experience delirium during hospitalization?  No  Do you have any other stressors you would like to discuss with your provider? No    Problems taking medications regularly:  None  Medication changes since discharge: None  Problems adhering to non-medication therapy:  None    Summary of hospitalization:  [facility] discharge summary reviewed  Diagnostic Tests/Treatments reviewed.  Follow up needed: none  Other Healthcare Providers Involved in Patient s Care:         None  Update since discharge: improved.         Plan of care communicated with patient and family           History gathered from mother   Is doing a lot better since discharge from hospital after a stay for aspiration pneumonia.   Vitals have stabilized. He is feeling well.   They have no concerns today       Review of Systems  Detailed as above         Objective           Vitals:  No vitals were obtained today due to virtual visit.    O2 96-98%  102/58  Hr 81   T 97.8     Physical Exam   GENERAL: alert and no distress  EYES: Eyes grossly normal to inspection.  No discharge or erythema, or obvious scleral/conjunctival abnormalities.  RESP: No audible wheeze, cough, or visible cyanosis.    SKIN: Visible skin clear. No significant rash, abnormal pigmentation or lesions.  NEURO: Cranial nerves grossly intact.  Mentation and speech appropriate for age.  PSYCH: Appropriate affect, tone, and pace of words          Video-Visit Details    Type of service:  Video Visit   Video End Time:3:22 PM  Originating Location (pt. Location): Home    Distant Location (provider location):  On-site  Platform used for Video Visit: Sheyla  Signed Electronically by: Elsa Queen MD

## 2024-10-18 ENCOUNTER — APPOINTMENT (OUTPATIENT)
Dept: GENERAL RADIOLOGY | Facility: CLINIC | Age: 64
DRG: 871 | End: 2024-10-18
Payer: MEDICARE

## 2024-10-18 ENCOUNTER — HOSPITAL ENCOUNTER (INPATIENT)
Facility: CLINIC | Age: 64
LOS: 7 days | Discharge: HOME HEALTH | DRG: 871 | End: 2024-10-25
Attending: EMERGENCY MEDICINE | Admitting: INTERNAL MEDICINE
Payer: MEDICARE

## 2024-10-18 DIAGNOSIS — R13.10 DYSPHAGIA, UNSPECIFIED TYPE: ICD-10-CM

## 2024-10-18 DIAGNOSIS — J96.01 ACUTE RESPIRATORY FAILURE WITH HYPOXIA (H): ICD-10-CM

## 2024-10-18 DIAGNOSIS — U07.1 INFECTION DUE TO 2019 NOVEL CORONAVIRUS: ICD-10-CM

## 2024-10-18 DIAGNOSIS — K66.8 PNEUMOPERITONEUM: ICD-10-CM

## 2024-10-18 DIAGNOSIS — A41.9 SEPTIC SHOCK (H): ICD-10-CM

## 2024-10-18 DIAGNOSIS — A41.9 ACUTE SEPSIS (H): ICD-10-CM

## 2024-10-18 DIAGNOSIS — A41.9 SEPSIS DUE TO URINARY TRACT INFECTION (H): ICD-10-CM

## 2024-10-18 DIAGNOSIS — Z93.1 G TUBE FEEDINGS (H): ICD-10-CM

## 2024-10-18 DIAGNOSIS — R40.4 TRANSIENT ALTERATION OF AWARENESS: ICD-10-CM

## 2024-10-18 DIAGNOSIS — Z87.19 HISTORY OF PANCREATITIS: ICD-10-CM

## 2024-10-18 DIAGNOSIS — R65.20 SEVERE SEPSIS (H): ICD-10-CM

## 2024-10-18 DIAGNOSIS — Z87.898 HISTORY OF SEIZURE: ICD-10-CM

## 2024-10-18 DIAGNOSIS — J69.0 ASPIRATION PNEUMONIA OF RIGHT LOWER LOBE, UNSPECIFIED ASPIRATION PNEUMONIA TYPE (H): ICD-10-CM

## 2024-10-18 DIAGNOSIS — M24.541 CONTRACTURE OF HAND JOINT, RIGHT: ICD-10-CM

## 2024-10-18 DIAGNOSIS — U07.1 COVID-19 VIRUS INFECTION: ICD-10-CM

## 2024-10-18 DIAGNOSIS — R68.89 EXCESSIVE ORAL SECRETIONS: ICD-10-CM

## 2024-10-18 DIAGNOSIS — J69.0 ASPIRATION PNEUMONITIS (H): ICD-10-CM

## 2024-10-18 DIAGNOSIS — I46.9 CARDIAC ARREST (H): ICD-10-CM

## 2024-10-18 DIAGNOSIS — H10.9 CONJUNCTIVITIS OF RIGHT EYE, UNSPECIFIED CONJUNCTIVITIS TYPE: ICD-10-CM

## 2024-10-18 DIAGNOSIS — J98.11 ATELECTASIS: ICD-10-CM

## 2024-10-18 DIAGNOSIS — N39.0 URINARY TRACT INFECTION IN MALE: ICD-10-CM

## 2024-10-18 DIAGNOSIS — R09.89 LABILE BLOOD PRESSURE: ICD-10-CM

## 2024-10-18 DIAGNOSIS — R41.82 ALTERED MENTAL STATUS, UNSPECIFIED ALTERED MENTAL STATUS TYPE: ICD-10-CM

## 2024-10-18 DIAGNOSIS — A41.9 SEPSIS, DUE TO UNSPECIFIED ORGANISM, UNSPECIFIED WHETHER ACUTE ORGAN DYSFUNCTION PRESENT (H): ICD-10-CM

## 2024-10-18 DIAGNOSIS — Z87.898 HISTORY OF BACTEREMIA: ICD-10-CM

## 2024-10-18 DIAGNOSIS — N39.0 SEPSIS DUE TO URINARY TRACT INFECTION (H): ICD-10-CM

## 2024-10-18 DIAGNOSIS — N39.0 ACUTE UTI: ICD-10-CM

## 2024-10-18 DIAGNOSIS — R05.9 COUGH: ICD-10-CM

## 2024-10-18 DIAGNOSIS — E03.9 HYPOTHYROIDISM, UNSPECIFIED TYPE: ICD-10-CM

## 2024-10-18 DIAGNOSIS — J18.9 RECURRENT PNEUMONIA: ICD-10-CM

## 2024-10-18 DIAGNOSIS — L89.303 PRESSURE INJURY OF BUTTOCK, STAGE 3, UNSPECIFIED LATERALITY (H): ICD-10-CM

## 2024-10-18 DIAGNOSIS — Z12.5 SCREENING FOR PROSTATE CANCER: ICD-10-CM

## 2024-10-18 DIAGNOSIS — A41.9 SEVERE SEPSIS (H): ICD-10-CM

## 2024-10-18 DIAGNOSIS — E23.0 PANHYPOPITUITARISM (H): ICD-10-CM

## 2024-10-18 DIAGNOSIS — G40.803 INTRACTABLE EPILEPSY DUE TO EXTERNAL CAUSES WITH STATUS EPILEPTICUS (H): ICD-10-CM

## 2024-10-18 DIAGNOSIS — H10.023 PINK EYE DISEASE OF BOTH EYES: ICD-10-CM

## 2024-10-18 DIAGNOSIS — J18.9 PNEUMONIA OF BOTH LOWER LOBES DUE TO INFECTIOUS ORGANISM: ICD-10-CM

## 2024-10-18 DIAGNOSIS — J18.9 SEPSIS DUE TO PNEUMONIA (H): ICD-10-CM

## 2024-10-18 DIAGNOSIS — R50.9 FEVER IN ADULT: ICD-10-CM

## 2024-10-18 DIAGNOSIS — E55.9 VITAMIN D DEFICIENCY: ICD-10-CM

## 2024-10-18 DIAGNOSIS — M62.81 GENERALIZED MUSCLE WEAKNESS: ICD-10-CM

## 2024-10-18 DIAGNOSIS — A41.9 SEPSIS DUE TO PNEUMONIA (H): ICD-10-CM

## 2024-10-18 DIAGNOSIS — J18.9 PNEUMONIA DUE TO INFECTIOUS ORGANISM, UNSPECIFIED LATERALITY, UNSPECIFIED PART OF LUNG: ICD-10-CM

## 2024-10-18 DIAGNOSIS — I42.1 HYPERTROPHIC OBSTRUCTIVE CARDIOMYOPATHY (H): ICD-10-CM

## 2024-10-18 DIAGNOSIS — N21.0 BLADDER CALCULUS: ICD-10-CM

## 2024-10-18 DIAGNOSIS — R79.89 ELEVATED LACTIC ACID LEVEL: ICD-10-CM

## 2024-10-18 DIAGNOSIS — N39.0 URINARY TRACT INFECTION WITHOUT HEMATURIA, SITE UNSPECIFIED: ICD-10-CM

## 2024-10-18 DIAGNOSIS — Z12.11 COLON CANCER SCREENING: ICD-10-CM

## 2024-10-18 DIAGNOSIS — E27.49 SECONDARY ADRENAL INSUFFICIENCY (H): ICD-10-CM

## 2024-10-18 DIAGNOSIS — J69.0 ASPIRATION PNEUMONIA OF RIGHT LOWER LOBE DUE TO GASTRIC SECRETIONS (H): ICD-10-CM

## 2024-10-18 DIAGNOSIS — N18.1 CKD (CHRONIC KIDNEY DISEASE) STAGE 1, GFR 90 ML/MIN OR GREATER: ICD-10-CM

## 2024-10-18 DIAGNOSIS — E87.6 HYPOKALEMIA: ICD-10-CM

## 2024-10-18 DIAGNOSIS — K85.91 NECROTIZING PANCREATITIS: ICD-10-CM

## 2024-10-18 DIAGNOSIS — Z23 NEED FOR PROPHYLACTIC VACCINATION AND INOCULATION AGAINST INFLUENZA: ICD-10-CM

## 2024-10-18 DIAGNOSIS — Z01.818 PREOPERATIVE EXAMINATION: ICD-10-CM

## 2024-10-18 DIAGNOSIS — L89.309 PRESSURE INJURY OF SKIN OF BUTTOCK, UNSPECIFIED INJURY STAGE, UNSPECIFIED LATERALITY: ICD-10-CM

## 2024-10-18 DIAGNOSIS — E86.0 DEHYDRATION: ICD-10-CM

## 2024-10-18 DIAGNOSIS — D72.825 BANDEMIA: ICD-10-CM

## 2024-10-18 DIAGNOSIS — R65.21 SEPTIC SHOCK (H): ICD-10-CM

## 2024-10-18 DIAGNOSIS — J18.9 COMMUNITY ACQUIRED PNEUMONIA OF RIGHT LOWER LOBE OF LUNG: ICD-10-CM

## 2024-10-18 DIAGNOSIS — Z78.9 FULL CODE STATUS: ICD-10-CM

## 2024-10-18 DIAGNOSIS — G40.909 RECURRENT SEIZURES (H): ICD-10-CM

## 2024-10-18 DIAGNOSIS — N17.9 ACUTE RENAL FAILURE, UNSPECIFIED ACUTE RENAL FAILURE TYPE (H): ICD-10-CM

## 2024-10-18 DIAGNOSIS — E29.1 HYPOGONADISM MALE: ICD-10-CM

## 2024-10-18 DIAGNOSIS — A41.9 SEPSIS WITHOUT ACUTE ORGAN DYSFUNCTION, DUE TO UNSPECIFIED ORGANISM (H): ICD-10-CM

## 2024-10-18 DIAGNOSIS — E83.51 HYPOCALCEMIA: ICD-10-CM

## 2024-10-18 DIAGNOSIS — F34.1 PERSISTENT DEPRESSIVE DISORDER: ICD-10-CM

## 2024-10-18 DIAGNOSIS — R09.02 HYPOXIA: ICD-10-CM

## 2024-10-18 DIAGNOSIS — M79.89 BILATERAL SWELLING OF FEET: ICD-10-CM

## 2024-10-18 DIAGNOSIS — J69.0 ASPIRATION PNEUMONIA OF BOTH LUNGS, UNSPECIFIED ASPIRATION PNEUMONIA TYPE, UNSPECIFIED PART OF LUNG (H): Primary | ICD-10-CM

## 2024-10-18 DIAGNOSIS — E87.1 HYPONATREMIA: ICD-10-CM

## 2024-10-18 DIAGNOSIS — R50.9 FEVER: ICD-10-CM

## 2024-10-18 DIAGNOSIS — D64.9 ANEMIA, UNSPECIFIED TYPE: ICD-10-CM

## 2024-10-18 DIAGNOSIS — T17.908A: ICD-10-CM

## 2024-10-18 DIAGNOSIS — K92.1 BLACK TARRY STOOLS: ICD-10-CM

## 2024-10-18 DIAGNOSIS — K66.8 FREE INTRAPERITONEAL AIR: ICD-10-CM

## 2024-10-18 DIAGNOSIS — J69.0 ASPIRATION PNEUMONIA OF RIGHT LUNG, UNSPECIFIED ASPIRATION PNEUMONIA TYPE, UNSPECIFIED PART OF LUNG (H): ICD-10-CM

## 2024-10-18 DIAGNOSIS — K59.00 CONSTIPATION, UNSPECIFIED CONSTIPATION TYPE: ICD-10-CM

## 2024-10-18 DIAGNOSIS — J69.0 ASPIRATION PNEUMONIA OF RIGHT MIDDLE LOBE, UNSPECIFIED ASPIRATION PNEUMONIA TYPE (H): ICD-10-CM

## 2024-10-18 DIAGNOSIS — E87.5 HYPERKALEMIA: ICD-10-CM

## 2024-10-18 DIAGNOSIS — J69.0 ASPIRATION PNEUMONIA, UNSPECIFIED ASPIRATION PNEUMONIA TYPE, UNSPECIFIED LATERALITY, UNSPECIFIED PART OF LUNG (H): ICD-10-CM

## 2024-10-18 DIAGNOSIS — E83.41 HYPERMAGNESEMIA: ICD-10-CM

## 2024-10-18 DIAGNOSIS — R50.9 FEVER AND CHILLS: ICD-10-CM

## 2024-10-18 DIAGNOSIS — K86.2 PANCREATIC CYST: ICD-10-CM

## 2024-10-18 DIAGNOSIS — Z12.11 SCREEN FOR COLON CANCER: ICD-10-CM

## 2024-10-18 DIAGNOSIS — Z99.3 WHEELCHAIR DEPENDENCE: ICD-10-CM

## 2024-10-18 LAB
ALBUMIN UR-MCNC: NEGATIVE MG/DL
ANION GAP SERPL CALCULATED.3IONS-SCNC: 11 MMOL/L (ref 7–15)
APPEARANCE UR: CLEAR
BASOPHILS # BLD AUTO: 0.1 10E3/UL (ref 0–0.2)
BASOPHILS NFR BLD AUTO: 1 %
BILIRUB UR QL STRIP: NEGATIVE
BUN SERPL-MCNC: 22.4 MG/DL (ref 8–23)
CALCIUM SERPL-MCNC: 8.6 MG/DL (ref 8.8–10.4)
CHLORIDE SERPL-SCNC: 105 MMOL/L (ref 98–107)
COLOR UR AUTO: YELLOW
CREAT SERPL-MCNC: 1.03 MG/DL (ref 0.67–1.17)
EGFRCR SERPLBLD CKD-EPI 2021: 82 ML/MIN/1.73M2
EOSINOPHIL # BLD AUTO: 0.5 10E3/UL (ref 0–0.7)
EOSINOPHIL NFR BLD AUTO: 4 %
ERYTHROCYTE [DISTWIDTH] IN BLOOD BY AUTOMATED COUNT: 15.6 % (ref 10–15)
FLUAV RNA SPEC QL NAA+PROBE: NEGATIVE
FLUBV RNA RESP QL NAA+PROBE: NEGATIVE
GLUCOSE SERPL-MCNC: 119 MG/DL (ref 70–99)
GLUCOSE UR STRIP-MCNC: NEGATIVE MG/DL
HCO3 SERPL-SCNC: 25 MMOL/L (ref 22–29)
HCT VFR BLD AUTO: 46.6 % (ref 40–53)
HGB BLD-MCNC: 14.8 G/DL (ref 13.3–17.7)
HGB UR QL STRIP: NEGATIVE
HOLD SPECIMEN: NORMAL
IMM GRANULOCYTES # BLD: 0.1 10E3/UL
IMM GRANULOCYTES NFR BLD: 1 %
KETONES UR STRIP-MCNC: NEGATIVE MG/DL
LACTATE SERPL-SCNC: 1.8 MMOL/L (ref 0.7–2)
LEUKOCYTE ESTERASE UR QL STRIP: NEGATIVE
LYMPHOCYTES # BLD AUTO: 2.7 10E3/UL (ref 0.8–5.3)
LYMPHOCYTES NFR BLD AUTO: 19 %
MCH RBC QN AUTO: 28.7 PG (ref 26.5–33)
MCHC RBC AUTO-ENTMCNC: 31.8 G/DL (ref 31.5–36.5)
MCV RBC AUTO: 90 FL (ref 78–100)
MONOCYTES # BLD AUTO: 1 10E3/UL (ref 0–1.3)
MONOCYTES NFR BLD AUTO: 7 %
NEUTROPHILS # BLD AUTO: 9.8 10E3/UL (ref 1.6–8.3)
NEUTROPHILS NFR BLD AUTO: 69 %
NITRATE UR QL: NEGATIVE
NRBC # BLD AUTO: 0 10E3/UL
NRBC BLD AUTO-RTO: 0 /100
PH UR STRIP: 7 [PH] (ref 5–7)
PLATELET # BLD AUTO: 246 10E3/UL (ref 150–450)
POTASSIUM SERPL-SCNC: 4.2 MMOL/L (ref 3.4–5.3)
RBC # BLD AUTO: 5.16 10E6/UL (ref 4.4–5.9)
RBC URINE: <1 /HPF
RSV RNA SPEC NAA+PROBE: NEGATIVE
SARS-COV-2 RNA RESP QL NAA+PROBE: NEGATIVE
SODIUM SERPL-SCNC: 141 MMOL/L (ref 135–145)
SP GR UR STRIP: 1.03 (ref 1–1.03)
SQUAMOUS EPITHELIAL: <1 /HPF
UROBILINOGEN UR STRIP-MCNC: 2 MG/DL
WBC # BLD AUTO: 14.2 10E3/UL (ref 4–11)
WBC URINE: 1 /HPF

## 2024-10-18 PROCEDURE — 87637 SARSCOV2&INF A&B&RSV AMP PRB: CPT

## 2024-10-18 PROCEDURE — 36415 COLL VENOUS BLD VENIPUNCTURE: CPT | Performed by: EMERGENCY MEDICINE

## 2024-10-18 PROCEDURE — 250N000013 HC RX MED GY IP 250 OP 250 PS 637: Performed by: INTERNAL MEDICINE

## 2024-10-18 PROCEDURE — 250N000011 HC RX IP 250 OP 636

## 2024-10-18 PROCEDURE — 36415 COLL VENOUS BLD VENIPUNCTURE: CPT

## 2024-10-18 PROCEDURE — 99285 EMERGENCY DEPT VISIT HI MDM: CPT | Mod: 25

## 2024-10-18 PROCEDURE — 87149 DNA/RNA DIRECT PROBE: CPT

## 2024-10-18 PROCEDURE — 80048 BASIC METABOLIC PNL TOTAL CA: CPT

## 2024-10-18 PROCEDURE — 87040 BLOOD CULTURE FOR BACTERIA: CPT

## 2024-10-18 PROCEDURE — 83605 ASSAY OF LACTIC ACID: CPT

## 2024-10-18 PROCEDURE — 85025 COMPLETE CBC W/AUTO DIFF WBC: CPT

## 2024-10-18 PROCEDURE — 96361 HYDRATE IV INFUSION ADD-ON: CPT

## 2024-10-18 PROCEDURE — 99223 1ST HOSP IP/OBS HIGH 75: CPT | Mod: AI | Performed by: INTERNAL MEDICINE

## 2024-10-18 PROCEDURE — 120N000001 HC R&B MED SURG/OB

## 2024-10-18 PROCEDURE — 96374 THER/PROPH/DIAG INJ IV PUSH: CPT

## 2024-10-18 PROCEDURE — 250N000011 HC RX IP 250 OP 636: Performed by: INTERNAL MEDICINE

## 2024-10-18 PROCEDURE — 71045 X-RAY EXAM CHEST 1 VIEW: CPT

## 2024-10-18 PROCEDURE — 81001 URINALYSIS AUTO W/SCOPE: CPT

## 2024-10-18 PROCEDURE — 258N000003 HC RX IP 258 OP 636

## 2024-10-18 RX ORDER — CHOLECALCIFEROL (VITAMIN D3) 50 MCG
100 TABLET ORAL DAILY
Status: DISCONTINUED | OUTPATIENT
Start: 2024-10-19 | End: 2024-10-25 | Stop reason: HOSPADM

## 2024-10-18 RX ORDER — PIPERACILLIN SODIUM, TAZOBACTAM SODIUM 4; .5 G/20ML; G/20ML
4.5 INJECTION, POWDER, LYOPHILIZED, FOR SOLUTION INTRAVENOUS ONCE
Status: COMPLETED | OUTPATIENT
Start: 2024-10-18 | End: 2024-10-18

## 2024-10-18 RX ORDER — ONDANSETRON 2 MG/ML
4 INJECTION INTRAMUSCULAR; INTRAVENOUS EVERY 6 HOURS PRN
Status: DISCONTINUED | OUTPATIENT
Start: 2024-10-18 | End: 2024-10-25 | Stop reason: HOSPADM

## 2024-10-18 RX ORDER — LIDOCAINE 40 MG/G
CREAM TOPICAL
Status: DISCONTINUED | OUTPATIENT
Start: 2024-10-18 | End: 2024-10-25 | Stop reason: HOSPADM

## 2024-10-18 RX ORDER — PROCHLORPERAZINE 25 MG
12.5 SUPPOSITORY, RECTAL RECTAL EVERY 12 HOURS PRN
Status: DISCONTINUED | OUTPATIENT
Start: 2024-10-18 | End: 2024-10-25 | Stop reason: HOSPADM

## 2024-10-18 RX ORDER — ALBUTEROL SULFATE 0.83 MG/ML
2.5 SOLUTION RESPIRATORY (INHALATION) 4 TIMES DAILY
Status: DISCONTINUED | OUTPATIENT
Start: 2024-10-18 | End: 2024-10-25 | Stop reason: HOSPADM

## 2024-10-18 RX ORDER — LEVOTHYROXINE SODIUM 100 UG/1
100 TABLET ORAL DAILY
Status: DISCONTINUED | OUTPATIENT
Start: 2024-10-19 | End: 2024-10-25 | Stop reason: HOSPADM

## 2024-10-18 RX ORDER — LANOLIN ALCOHOL/MO/W.PET/CERES
1000 CREAM (GRAM) TOPICAL DAILY
Status: DISCONTINUED | OUTPATIENT
Start: 2024-10-19 | End: 2024-10-25 | Stop reason: HOSPADM

## 2024-10-18 RX ORDER — MULTIVITAMIN,THERAPEUTIC
1 TABLET ORAL DAILY
Status: DISCONTINUED | OUTPATIENT
Start: 2024-10-19 | End: 2024-10-25 | Stop reason: HOSPADM

## 2024-10-18 RX ORDER — CARBAMAZEPINE 100 MG/5ML
100 SUSPENSION ORAL DAILY
Status: DISCONTINUED | OUTPATIENT
Start: 2024-10-19 | End: 2024-10-25 | Stop reason: HOSPADM

## 2024-10-18 RX ORDER — ACETYLCYSTEINE 200 MG/ML
2 SOLUTION ORAL; RESPIRATORY (INHALATION) 4 TIMES DAILY
Status: DISCONTINUED | OUTPATIENT
Start: 2024-10-18 | End: 2024-10-25 | Stop reason: HOSPADM

## 2024-10-18 RX ORDER — BENZOCAINE/MENTHOL 6 MG-10 MG
LOZENGE MUCOUS MEMBRANE 2 TIMES DAILY PRN
Status: DISCONTINUED | OUTPATIENT
Start: 2024-10-18 | End: 2024-10-25 | Stop reason: HOSPADM

## 2024-10-18 RX ORDER — SODIUM CHLORIDE 1 G/1
1 TABLET ORAL 2 TIMES DAILY
Status: DISCONTINUED | OUTPATIENT
Start: 2024-10-18 | End: 2024-10-25 | Stop reason: HOSPADM

## 2024-10-18 RX ORDER — POLYETHYLENE GLYCOL 3350 17 G/17G
17 POWDER, FOR SOLUTION ORAL DAILY
Status: DISCONTINUED | OUTPATIENT
Start: 2024-10-19 | End: 2024-10-25 | Stop reason: HOSPADM

## 2024-10-18 RX ORDER — ACETAMINOPHEN 325 MG/1
650 TABLET ORAL EVERY 4 HOURS PRN
Status: DISCONTINUED | OUTPATIENT
Start: 2024-10-18 | End: 2024-10-25 | Stop reason: HOSPADM

## 2024-10-18 RX ORDER — GLYCOPYRROLATE 1 MG/1
2 TABLET ORAL 2 TIMES DAILY
Status: DISCONTINUED | OUTPATIENT
Start: 2024-10-18 | End: 2024-10-25 | Stop reason: HOSPADM

## 2024-10-18 RX ORDER — ACETAMINOPHEN 650 MG/1
650 SUPPOSITORY RECTAL EVERY 4 HOURS PRN
Status: DISCONTINUED | OUTPATIENT
Start: 2024-10-18 | End: 2024-10-25 | Stop reason: HOSPADM

## 2024-10-18 RX ORDER — MUPIROCIN 20 MG/G
OINTMENT TOPICAL 2 TIMES DAILY PRN
Status: DISCONTINUED | OUTPATIENT
Start: 2024-10-18 | End: 2024-10-25 | Stop reason: HOSPADM

## 2024-10-18 RX ORDER — METOCLOPRAMIDE HYDROCHLORIDE 5 MG/5ML
10 SOLUTION ORAL
Status: DISCONTINUED | OUTPATIENT
Start: 2024-10-18 | End: 2024-10-25 | Stop reason: HOSPADM

## 2024-10-18 RX ORDER — ASCORBIC ACID 500 MG
3000 TABLET ORAL DAILY
Status: DISCONTINUED | OUTPATIENT
Start: 2024-10-19 | End: 2024-10-25 | Stop reason: HOSPADM

## 2024-10-18 RX ORDER — ALBUTEROL SULFATE 5 MG/ML
2.5 SOLUTION RESPIRATORY (INHALATION) EVERY 6 HOURS PRN
Status: DISCONTINUED | OUTPATIENT
Start: 2024-10-18 | End: 2024-10-25 | Stop reason: HOSPADM

## 2024-10-18 RX ORDER — PIPERACILLIN SODIUM, TAZOBACTAM SODIUM 3; .375 G/15ML; G/15ML
3.38 INJECTION, POWDER, LYOPHILIZED, FOR SOLUTION INTRAVENOUS EVERY 6 HOURS
Status: DISCONTINUED | OUTPATIENT
Start: 2024-10-18 | End: 2024-10-21

## 2024-10-18 RX ORDER — GINSENG 100 MG
CAPSULE ORAL DAILY PRN
Status: DISCONTINUED | OUTPATIENT
Start: 2024-10-18 | End: 2024-10-25 | Stop reason: HOSPADM

## 2024-10-18 RX ORDER — CARBAMAZEPINE 100 MG/5ML
150 SUSPENSION ORAL 3 TIMES DAILY
Status: DISCONTINUED | OUTPATIENT
Start: 2024-10-18 | End: 2024-10-25 | Stop reason: HOSPADM

## 2024-10-18 RX ORDER — SENNOSIDES 8.6 MG
1 TABLET ORAL 2 TIMES DAILY PRN
Status: DISCONTINUED | OUTPATIENT
Start: 2024-10-18 | End: 2024-10-25 | Stop reason: HOSPADM

## 2024-10-18 RX ADMIN — SODIUM CHLORIDE 1000 ML: 9 INJECTION, SOLUTION INTRAVENOUS at 14:32

## 2024-10-18 RX ADMIN — GLYCOPYRROLATE 2 MG: 1 TABLET ORAL at 20:51

## 2024-10-18 RX ADMIN — SODIUM CHLORIDE TAB 1 GM 1 G: 1 TAB at 21:31

## 2024-10-18 RX ADMIN — Medication 40 MG: at 21:31

## 2024-10-18 RX ADMIN — BRIVARACETAM 100 MG: 10 SOLUTION ORAL at 21:35

## 2024-10-18 RX ADMIN — PIPERACILLIN AND TAZOBACTAM 4.5 G: 4; .5 INJECTION, POWDER, FOR SOLUTION INTRAVENOUS at 15:54

## 2024-10-18 RX ADMIN — METOCLOPRAMIDE HYDROCHLORIDE 10 MG: 5 SOLUTION ORAL at 21:30

## 2024-10-18 RX ADMIN — PIPERACILLIN AND TAZOBACTAM 3.38 G: 3; .375 INJECTION, POWDER, FOR SOLUTION INTRAVENOUS at 21:31

## 2024-10-18 ASSESSMENT — ACTIVITIES OF DAILY LIVING (ADL)
ADLS_ACUITY_SCORE: 41
ADLS_ACUITY_SCORE: 63
ADLS_ACUITY_SCORE: 41
ADLS_ACUITY_SCORE: 59
ADLS_ACUITY_SCORE: 63
ADLS_ACUITY_SCORE: 41
ADLS_ACUITY_SCORE: 59
ADLS_ACUITY_SCORE: 59

## 2024-10-18 NOTE — PROGRESS NOTES
Patient transferred on the Unit @ 1830. Alert, nonverbal. Incontinent of bowel and bladder, external catheter in place. NPO. Contact precaution maintained.
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
## 2024-10-18 NOTE — ED PROVIDER NOTES
"ED APC SUPERVISION NOTE:   I evaluated this patient in conjunction with Rosemary Raya PA-C  I have participated in the care of the patient and personally performed key elements of the history, exam, and medical decision making.      HPI:   Keyon Farias is a 64 year old male who presents for fever and cough. Patient's family was concerned as he was increasingly fatigued, feverish, congested so they brought him to the emergency department for evaluation.       Independent Historian:   {EPPA Independent Historian:[id number]::\"None\"}    Review of External Notes: ***      EXAM:   ***    Independent Interpretation (X-rays, CTs, rhythm strip):  {IndependentReview:015016::\"None\"}    Consultations/Discussion of Management or Tests:  {Consults/Care Discussions:115406::\"None\"}     MEDICAL DECISION MAKING/ASSESSMENT AND PLAN:   ***      DIAGNOSIS:   No diagnosis found.      Scribe Disclosure:  I, Matthew Merino, am serving as a scribe at 2:50 PM on 10/18/2024 to document services personally performed by Brandon Christine MD based on my observations and the provider's statements to me.     10/18/2024  [facility] EMERGENCY DEPT  "

## 2024-10-18 NOTE — ED NOTES
Bed: ED08  Expected date:   Expected time:   Means of arrival:   Comments:  E1  64 M fever/bed bound  1349

## 2024-10-18 NOTE — ED PROVIDER NOTES
Emergency Department Attending Supervision Note  10/18/2024  4:56 PM      I evaluated this patient in conjunction with Rosemary Raya    Briefly, the patient presented with recurrent fever      On my exam, at baseline nonverbal mouth open.  Mildly tachypneic coarse breath sounds bilaterally.    Results:    ED course:    My impression is recurrent aspiration pneumonia.  Patient will require a care plan as I am not sure what the goals of care of this patient is.  Patient recurs to the emergency room is bedbound is tube fed continues to aspirate fluid and gets admitted on a monthly basis for recurrent fever and likely it will either atelectasis or pneumonia.  Patient meets minimal criteria for sepsis but will admit due to patient's recurrent visits and inability to care for patient at home.        Diagnosis    ICD-10-CM    1. Aspiration pneumonia of right lung, unspecified aspiration pneumonia type, unspecified part of lung (H)  J69.0             Brandon Christine MD Goodman, Brian Samuel, MD  10/19/24 1103

## 2024-10-18 NOTE — ED NOTES
[facility]  ED Nurse Handoff Report    ED Chief complaint: Fever and Cough      ED Diagnosis:   Final diagnoses:   Aspiration pneumonia of right lung, unspecified aspiration pneumonia type, unspecified part of lung (H)       Code Status: Full Code    Allergies:   Allergies   Allergen Reactions    Valproic Acid Other (See Comments)     Toxicity w/ bone marrow suspension, elevated ammonia levels    Toxicity with bone marrow suspension   Elevated ammonia levels    Poisens system    Scopolamine Hives     Hives with the patch - oral no problem    Vancomycin Other (See Comments)     Vancomycin flushing syndrome - pt tolerated dose at half rate.    Phenytoin Sodium        Patient Story:   Pt presents from home with EMS for fever and congestion    Focused Assessment:    Neuro: Alert, oriented x 1  Respiratory:Clear lung sounds, on room air   Cardiology:  Tachy   Gastrointestinal: soft, non tender, non distended   Genitourinary/Renal:    Musculoskeletal: moves all extremities   Skin: Intact skin   Lines: 18 right wrist    Labs Ordered and Resulted from Time of ED Arrival to Time of ED Departure   BASIC METABOLIC PANEL - Abnormal       Result Value    Sodium 141      Potassium 4.2      Chloride 105      Carbon Dioxide (CO2) 25      Anion Gap 11      Urea Nitrogen 22.4      Creatinine 1.03      GFR Estimate 82      Calcium 8.6 (*)     Glucose 119 (*)    CBC WITH PLATELETS AND DIFFERENTIAL - Abnormal    WBC Count 14.2 (*)     RBC Count 5.16      Hemoglobin 14.8      Hematocrit 46.6      MCV 90      MCH 28.7      MCHC 31.8      RDW 15.6 (*)     Platelet Count 246      % Neutrophils 69      % Lymphocytes 19      % Monocytes 7      % Eosinophils 4      % Basophils 1      % Immature Granulocytes 1      NRBCs per 100 WBC 0      Absolute Neutrophils 9.8 (*)     Absolute Lymphocytes 2.7      Absolute Monocytes 1.0      Absolute Eosinophils 0.5      Absolute Basophils 0.1      Absolute Immature Granulocytes 0.1       Absolute NRBCs 0.0     LACTIC ACID WHOLE BLOOD WITH 1X REPEAT IN 2 HR WHEN >2 - Normal    Lactic Acid, Initial 1.8     INFLUENZA A/B, RSV, & SARS-COV2 PCR - Normal    Influenza A PCR Negative      Influenza B PCR Negative      RSV PCR Negative      SARS CoV2 PCR Negative     ROUTINE UA WITH MICROSCOPIC REFLEX TO CULTURE   BLOOD CULTURE        XR Chest Port 1 View   Final Result   IMPRESSION: Stable size of cardiomediastinal silhouette. Persistent   elevation the right hemidiaphragm with increased medial/basilar   opacity in the right lung, could represent atelectasis, aspiration or   developing pneumonia. Left lung is clear. No pleural effusion or   pneumothorax. No acute bony abnormality.      EDER MCDANIEL MD            SYSTEM ID:  [id number]            Treatments and/or interventions provided:      Medications   sodium chloride 0.9% BOLUS 1,000 mL (0 mLs Intravenous Stopped 10/18/24 1553)   piperacillin-tazobactam (ZOSYN) 4.5 g vial to attach to  mL bag (0 g Intravenous Stopped 10/18/24 1621)        Patient's response to treatments and/or interventions:   Resting comfortably    To be done/followed up on inpatient unit:    See any in-patient orders    Does this patient have any cognitive concerns?:  read history    Activity level - Baseline/Home:    Total Care    Activity Level - Current:     Total Care    Patient's Preferred language: English     Needed?: No    Isolation: None  Infection: Not Applicable  Patient tested for COVID 19 prior to admission: YES    Bariatric?: No    Vital Signs:   Vitals:    10/18/24 1744 10/18/24 1745 10/18/24 1746 10/18/24 1800   BP:    101/63   Pulse:    101   Resp:       Temp:       TempSrc:       SpO2: 92% 93% 95%        Cardiac Rhythm:Cardiac Rhythm: Sinus tachycardia    Was the PSS-3 completed:   Yes    Family Comments: Family has been called    For the majority of the shift this patient's behavior was Green.   Behavioral interventions performed were     ED  NURSE PHONE NUMBER: *[phone]

## 2024-10-18 NOTE — PHARMACY-ADMISSION MEDICATION HISTORY
Pharmacist Admission Medication History    Admission medication history is complete. The information provided in this note is only as accurate as the sources available at the time of the update.    Information Source(s): Family member, Hospital records, and [facility]Everywhere[facility] via in-person    Pertinent Information: Spoke with patient's guardian, last received medications were his 0900 doses. Will require carbamazepine 150mg and 100mg dose tonight.    Changes made to PTA medication list:  Added: None  Deleted: None  Changed: None    Allergies reviewed with patient and updates made in EHR: yes    Medication History Completed By: Jag Estevez RP 10/18/2024 5:37 PM    PTA Med List   Medication Sig Last Dose    acetaminophen (TYLENOL) 325 MG tablet Take 650 mg by mouth every 6 hours as needed for mild pain 10/17/2024 at PRN    acetylcysteine (MUCOMYST) 20 % neb solution Take 2 mLs by nebulization 4 times daily (To use along with albuterol nebs) Past Week    albuterol (PROVENTIL) (5 MG/ML) 0.5% neb solution Take 0.5 mLs (2.5 mg) by nebulization every 6 hours as needed for shortness of breath, wheezing or cough 0700 1100 1500 1900 with mucomyst Unknown at PRN    bacitracin 500 UNIT/GM external ointment Apply topically daily as needed for wound care To PEG site. 10/17/2024 at PRN    Brivaracetam (BRIVIACT) 10 MG/ML solution Take 100 mg by mouth or Feeding Tube 2 times daily 0900, 2100 10/18/2024 at AM    carBAMazepine (TEGRETOL) 100 MG/5ML suspension Take 7.5 mLs (150 mg) by mouth or Feeding Tube 3 times daily. 10/18/2024 at AM    carBAMazepine (TEGRETOL) 100 MG/5ML suspension Place 5 mLs (100 mg) into Feeding Tube daily. 10/17/2024 at PM    COMPOUNDED NON-CONTROLLED SUBSTANCE (CMPD RX) - PHARMACY TO MIX COMPOUNDED MEDICATION Scopolamine 0.4mg capsules - take  2 capsule by feeding tube three times daily as needed Unknown at PRN    Cyanocobalamin (VITAMIN B-12 PO) 1,000 mcg by Per Feeding Tube route daily  10/17/2024    glycopyrrolate (ROBINUL) 1 MG tablet Take 2 tablets (2 mg) by mouth 2 times daily. TAKE 1 TABLET(1 MG) BY MOUTH TWICE DAILY AS NEEDED FOR SECRETIONS 10/18/2024 at AM    guaiFENesin (MUCINEX) 600 MG 12 hr tablet Take 1 tablet (600 mg) by mouth 2 times daily as needed for congestion Unknown at PRN    hydrocortisone (CORTAID) 1 % external cream Apply topically 2 times daily as needed Apply to reddened memo areas as needed 10/17/2024 at PRN    hydrocortisone (CORTEF) 5 MG tablet Take 15 mg (3 tablets) in the morning and 7.5 mg (1.5 tablet)  at 2:00 PM. During illness patient takes more as a stress dose. Please increase the dose as directed. 10/18/2024 at AM    levothyroxine (SYNTHROID/LEVOTHROID) 100 MCG tablet Take 1 tablet (100 mcg) by mouth daily 10/18/2024 at AM    metoclopramide (REGLAN) 10 MG/10ML SOLN solution TAKE 10 ML BY MOUTH FOUR TIMES DAILY(BEFORE MEALS AND NIGHTLY) AT 8 AM, 12 PM, 4 PM, AND 8 PM. 10/18/2024 at AM    miconazole (MICATIN) 2 % external powder Apply topically 2 times daily as needed 10/17/2024 at PRN    multivitamin, therapeutic (THERA-VIT) TABS tablet 1 tablet by Per Feeding Tube route daily 10/18/2024 at AM    mupirocin (BACTROBAN) 2 % external ointment APPLY TOPICALLY TO THE AFFECTED AREA TWICE DAILY AS NEEDED 10/17/2024 at PRN    pantoprazole (PROTONIX) 2 mg/mL SUSP suspension Place 20 mLs (40 mg) into Feeding Tube 2 times daily 10/17/2024    polyethylene glycol (MIRALAX) 17 GM/Dose powder Take 17 g by mouth daily. 10/17/2024    potassium & sodium phosphates (NEUTRA-PHOS) 280-160-250 MG Packet Take 1 packet by mouth daily 10/18/2024 at AM    sennosides (SENOKOT) 8.6 MG tablet Take 1 tablet by mouth 2 times daily as needed for constipation 10/17/2024 at PRN    sodium chloride 1 GM tablet Place 1 tablet (1 g) into Feeding Tube 2 times daily 10/18/2024 at AM    testosterone cypionate (DEPOTESTOSTERONE) 200 MG/ML injection INJECT 0.25ML IN THE MUSCLE ONCE A WEEK. 10/11/2024     vitamin C (ASCORBIC ACID) 1000 MG TABS 3,000 mg by Oral or Feeding Tube route daily 10/18/2024 at AM    vitamin D3 (CHOLECALCIFEROL) 2000 units (50 mcg) tablet 4,000 Units by Per Feeding Tube route daily 10/18/2024 at AM

## 2024-10-18 NOTE — PROGRESS NOTES
RECEIVING UNIT ED HANDOFF REVIEW    ED Nurse Handoff Report was reviewed by: Ana Wagner RN on October 18, 2024 at 5:52 PM

## 2024-10-18 NOTE — ED TRIAGE NOTES
Pt presents via stretcher from home with family concerns of fatigue, fever, and congestion.      Triage Assessment (Adult)       Row Name 10/18/24 9783          Triage Assessment    Airway WDL WDL        Respiratory WDL    Respiratory WDL X;cough     Cough Frequency infrequent     Cough Type congested        Skin Circulation/Temperature WDL    Skin Circulation/Temperature WDL WDL        Cardiac WDL    Cardiac WDL rhythm     Cardiac Rhythm ST        Peripheral/Neurovascular WDL    Peripheral Neurovascular WDL WDL        Cognitive/Neuro/Behavioral WDL    Cognitive/Neuro/Behavioral WDL WDL
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
## 2024-10-19 LAB
ANION GAP SERPL CALCULATED.3IONS-SCNC: 9 MMOL/L (ref 7–15)
BASOPHILS # BLD AUTO: 0.1 10E3/UL (ref 0–0.2)
BASOPHILS NFR BLD AUTO: 1 %
BUN SERPL-MCNC: 19.3 MG/DL (ref 8–23)
CALCIUM SERPL-MCNC: 7.9 MG/DL (ref 8.8–10.4)
CHLORIDE SERPL-SCNC: 110 MMOL/L (ref 98–107)
CREAT SERPL-MCNC: 1.1 MG/DL (ref 0.67–1.17)
EGFRCR SERPLBLD CKD-EPI 2021: 76 ML/MIN/1.73M2
ENTEROCOCCUS FAECALIS: NOT DETECTED
ENTEROCOCCUS FAECIUM: NOT DETECTED
EOSINOPHIL # BLD AUTO: 0.6 10E3/UL (ref 0–0.7)
EOSINOPHIL NFR BLD AUTO: 5 %
ERYTHROCYTE [DISTWIDTH] IN BLOOD BY AUTOMATED COUNT: 15.4 % (ref 10–15)
GLUCOSE BLDC GLUCOMTR-MCNC: 128 MG/DL (ref 70–99)
GLUCOSE SERPL-MCNC: 96 MG/DL (ref 70–99)
HCO3 SERPL-SCNC: 26 MMOL/L (ref 22–29)
HCT VFR BLD AUTO: 42.5 % (ref 40–53)
HGB BLD-MCNC: 13.1 G/DL (ref 13.3–17.7)
IMM GRANULOCYTES # BLD: 0 10E3/UL
IMM GRANULOCYTES NFR BLD: 0 %
LISTERIA SPECIES (DETECTED/NOT DETECTED): NOT DETECTED
LYMPHOCYTES # BLD AUTO: 2.4 10E3/UL (ref 0.8–5.3)
LYMPHOCYTES NFR BLD AUTO: 21 %
MCH RBC QN AUTO: 28.2 PG (ref 26.5–33)
MCHC RBC AUTO-ENTMCNC: 30.8 G/DL (ref 31.5–36.5)
MCV RBC AUTO: 91 FL (ref 78–100)
MONOCYTES # BLD AUTO: 0.9 10E3/UL (ref 0–1.3)
MONOCYTES NFR BLD AUTO: 8 %
NEUTROPHILS # BLD AUTO: 7.5 10E3/UL (ref 1.6–8.3)
NEUTROPHILS NFR BLD AUTO: 65 %
NRBC # BLD AUTO: 0 10E3/UL
NRBC BLD AUTO-RTO: 0 /100
PLATELET # BLD AUTO: 171 10E3/UL (ref 150–450)
POTASSIUM SERPL-SCNC: 3.8 MMOL/L (ref 3.4–5.3)
RBC # BLD AUTO: 4.65 10E6/UL (ref 4.4–5.9)
SODIUM SERPL-SCNC: 145 MMOL/L (ref 135–145)
STAPHYLOCOCCUS AUREUS: NOT DETECTED
STAPHYLOCOCCUS EPIDERMIDIS: NOT DETECTED
STAPHYLOCOCCUS LUGDUNENSIS: NOT DETECTED
STAPHYLOCOCCUS SPECIES: DETECTED
STREPTOCOCCUS AGALACTIAE: NOT DETECTED
STREPTOCOCCUS ANGINOSUS GROUP: NOT DETECTED
STREPTOCOCCUS PNEUMONIAE: NOT DETECTED
STREPTOCOCCUS PYOGENES: NOT DETECTED
STREPTOCOCCUS SPECIES: NOT DETECTED
WBC # BLD AUTO: 11.6 10E3/UL (ref 4–11)

## 2024-10-19 PROCEDURE — 99233 SBSQ HOSP IP/OBS HIGH 50: CPT | Performed by: HOSPITALIST

## 2024-10-19 PROCEDURE — 258N000003 HC RX IP 258 OP 636: Performed by: HOSPITALIST

## 2024-10-19 PROCEDURE — 999N000157 HC STATISTIC RCP TIME EA 10 MIN

## 2024-10-19 PROCEDURE — 120N000001 HC R&B MED SURG/OB

## 2024-10-19 PROCEDURE — 85025 COMPLETE CBC W/AUTO DIFF WBC: CPT | Performed by: INTERNAL MEDICINE

## 2024-10-19 PROCEDURE — 250N000009 HC RX 250: Performed by: INTERNAL MEDICINE

## 2024-10-19 PROCEDURE — 250N000011 HC RX IP 250 OP 636: Performed by: HOSPITALIST

## 2024-10-19 PROCEDURE — 250N000013 HC RX MED GY IP 250 OP 250 PS 637: Performed by: INTERNAL MEDICINE

## 2024-10-19 PROCEDURE — 80048 BASIC METABOLIC PNL TOTAL CA: CPT | Performed by: INTERNAL MEDICINE

## 2024-10-19 PROCEDURE — 250N000011 HC RX IP 250 OP 636: Performed by: INTERNAL MEDICINE

## 2024-10-19 PROCEDURE — 36415 COLL VENOUS BLD VENIPUNCTURE: CPT | Performed by: INTERNAL MEDICINE

## 2024-10-19 PROCEDURE — 94640 AIRWAY INHALATION TREATMENT: CPT | Mod: 76

## 2024-10-19 RX ORDER — CEFAZOLIN SODIUM 1 G/50ML
750 SOLUTION INTRAVENOUS EVERY 12 HOURS
Status: DISCONTINUED | OUTPATIENT
Start: 2024-10-19 | End: 2024-10-20

## 2024-10-19 RX ADMIN — CYANOCOBALAMIN TAB 1000 MCG 1000 MCG: 1000 TAB at 10:36

## 2024-10-19 RX ADMIN — SODIUM CHLORIDE TAB 1 GM 1 G: 1 TAB at 20:01

## 2024-10-19 RX ADMIN — ALBUTEROL SULFATE 2.5 MG: 2.5 SOLUTION RESPIRATORY (INHALATION) at 05:15

## 2024-10-19 RX ADMIN — CARBAMAZEPINE 150 MG: 100 SUSPENSION ORAL at 01:13

## 2024-10-19 RX ADMIN — PIPERACILLIN AND TAZOBACTAM 3.38 G: 3; .375 INJECTION, POWDER, FOR SOLUTION INTRAVENOUS at 10:38

## 2024-10-19 RX ADMIN — Medication 40 MG: at 20:01

## 2024-10-19 RX ADMIN — Medication 100 MCG: at 10:36

## 2024-10-19 RX ADMIN — VANCOMYCIN HYDROCHLORIDE 750 MG: 500 INJECTION, POWDER, LYOPHILIZED, FOR SOLUTION INTRAVENOUS at 18:26

## 2024-10-19 RX ADMIN — CARBAMAZEPINE 100 MG: 100 SUSPENSION ORAL at 18:27

## 2024-10-19 RX ADMIN — PIPERACILLIN AND TAZOBACTAM 3.38 G: 3; .375 INJECTION, POWDER, FOR SOLUTION INTRAVENOUS at 03:26

## 2024-10-19 RX ADMIN — ACETYLCYSTEINE 2 ML: 200 SOLUTION ORAL; RESPIRATORY (INHALATION) at 20:19

## 2024-10-19 RX ADMIN — ALBUTEROL SULFATE 2.5 MG: 2.5 SOLUTION RESPIRATORY (INHALATION) at 20:19

## 2024-10-19 RX ADMIN — PIPERACILLIN AND TAZOBACTAM 3.38 G: 3; .375 INJECTION, POWDER, FOR SOLUTION INTRAVENOUS at 16:28

## 2024-10-19 RX ADMIN — BRIVARACETAM 100 MG: 10 SOLUTION ORAL at 20:07

## 2024-10-19 RX ADMIN — ACETYLCYSTEINE 2 ML: 200 SOLUTION ORAL; RESPIRATORY (INHALATION) at 10:20

## 2024-10-19 RX ADMIN — HYDROCORTISONE 7.5 MG: 5 TABLET ORAL at 13:57

## 2024-10-19 RX ADMIN — PIPERACILLIN AND TAZOBACTAM 3.38 G: 3; .375 INJECTION, POWDER, FOR SOLUTION INTRAVENOUS at 22:32

## 2024-10-19 RX ADMIN — CARBAMAZEPINE 150 MG: 100 SUSPENSION ORAL at 22:33

## 2024-10-19 RX ADMIN — METOCLOPRAMIDE HYDROCHLORIDE 10 MG: 5 SOLUTION ORAL at 10:17

## 2024-10-19 RX ADMIN — LEVOTHYROXINE SODIUM 100 MCG: 100 TABLET ORAL at 06:30

## 2024-10-19 RX ADMIN — Medication 40 MG: at 10:17

## 2024-10-19 RX ADMIN — HYDROCORTISONE: 1 CREAM TOPICAL at 01:13

## 2024-10-19 RX ADMIN — ALBUTEROL SULFATE 2.5 MG: 2.5 SOLUTION RESPIRATORY (INHALATION) at 10:20

## 2024-10-19 RX ADMIN — ALBUTEROL SULFATE 2.5 MG: 2.5 SOLUTION RESPIRATORY (INHALATION) at 15:03

## 2024-10-19 RX ADMIN — METOCLOPRAMIDE HYDROCHLORIDE 10 MG: 5 SOLUTION ORAL at 20:01

## 2024-10-19 RX ADMIN — ACETYLCYSTEINE 2 ML: 200 SOLUTION ORAL; RESPIRATORY (INHALATION) at 15:03

## 2024-10-19 RX ADMIN — OXYCODONE HYDROCHLORIDE AND ACETAMINOPHEN 3000 MG: 500 TABLET ORAL at 10:37

## 2024-10-19 RX ADMIN — HYDROCORTISONE 15 MG: 10 TABLET ORAL at 10:17

## 2024-10-19 RX ADMIN — GLYCOPYRROLATE 2 MG: 1 TABLET ORAL at 10:36

## 2024-10-19 RX ADMIN — ACETYLCYSTEINE 2 ML: 200 SOLUTION ORAL; RESPIRATORY (INHALATION) at 07:12

## 2024-10-19 RX ADMIN — CARBAMAZEPINE 150 MG: 100 SUSPENSION ORAL at 06:30

## 2024-10-19 RX ADMIN — METOCLOPRAMIDE HYDROCHLORIDE 10 MG: 5 SOLUTION ORAL at 16:28

## 2024-10-19 RX ADMIN — GLYCOPYRROLATE 2 MG: 1 TABLET ORAL at 20:01

## 2024-10-19 RX ADMIN — BRIVARACETAM 100 MG: 10 SOLUTION ORAL at 10:17

## 2024-10-19 RX ADMIN — CARBAMAZEPINE 150 MG: 100 SUSPENSION ORAL at 13:54

## 2024-10-19 RX ADMIN — SODIUM CHLORIDE TAB 1 GM 1 G: 1 TAB at 10:17

## 2024-10-19 RX ADMIN — ALBUTEROL SULFATE 2.5 MG: 2.5 SOLUTION RESPIRATORY (INHALATION) at 07:12

## 2024-10-19 RX ADMIN — MULTIVITAMIN TABLET 1 TABLET: TABLET at 10:37

## 2024-10-19 RX ADMIN — Medication 1 PACKET: at 10:36

## 2024-10-19 RX ADMIN — METOCLOPRAMIDE HYDROCHLORIDE 10 MG: 5 SOLUTION ORAL at 13:54

## 2024-10-19 ASSESSMENT — ACTIVITIES OF DAILY LIVING (ADL)
ADLS_ACUITY_SCORE: 63

## 2024-10-19 NOTE — PROVIDER NOTIFICATION
"MD Notification    Notified Person: MD    Notified Person Name: Josefa     Notification Date/Time: 10/19/24  1:39pm    Notification Interaction: [place]    Purpose of Notification: \" Critical lab result: Blood culture from 10/18 growing gram positive cocci in clusters. Patient is currently on zosyn 3.375g Q6H.\"    Orders Received: none    "

## 2024-10-19 NOTE — PROVIDER NOTIFICATION
MD Notification    Notified Person: MD    Notified Person Name: Mariaa    Notification Date/Time: 10-18-24, 1914    Notification Interaction:  vocera    Purpose of Notification: missing meds    carBAMazepine (TEGRETOL) 100 MG/5ML suspension  Looking for this med, tegretol 7.5mL (150mg) per suspension at midnight, missed both day doses ,and mom requesting hydrocortisol cream face as he is breaking out.    Orders Received: please notify admitting doc <Luis Westfall MD>    Comments:
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
## 2024-10-19 NOTE — PHARMACY-VANCOMYCIN DOSING SERVICE
"Pharmacy Vancomycin Initial Note  Date of Service 2024  Patient's  1960  64 year old, male    Indication: Bacteremia    Current estimated CrCl = Estimated Creatinine Clearance: 64.5 mL/min (based on SCr of 1.1 mg/dL).    Creatinine for last 3 days  10/18/2024:  2:05 PM Creatinine 1.03 mg/dL  10/19/2024:  9:25 AM Creatinine 1.10 mg/dL    Recent Vancomycin Level(s) for last 3 days  No results found for requested labs within last 3 days.      Vancomycin IV Administrations (past 72 hours)        No vancomycin orders with administrations in past 72 hours.                    Nephrotoxins and other renal medications (From now, onward)      Start     Dose/Rate Route Frequency Ordered Stop    10/19/24 1700  vancomycin (VANCOCIN) 750 mg in sodium chloride 0.9 % 282.5 mL intermittent infusion         750 mg  over 120 Minutes Intravenous EVERY 12 HOURS 10/19/24 1612      10/18/24 2200  piperacillin-tazobactam (ZOSYN) 3.375 g vial to attach to  mL bag        Note to Pharmacy: For [facility], [facility] and [facility]: For Zosyn-naive patients, use the \"Zosyn initial dose + extended infusion\" order panel.    3.375 g  over 30 Minutes Intravenous EVERY 6 HOURS 10/18/24 1822              Contrast Orders - past 72 hours (72h ago, onward)      None            Insight[employer] Prediction of Planned Initial Vancomycin Regimen  Loading dose: N/A  Regimen: 750 mg IV every 12 hours.  Start time: 16:09 on 10/19/2024  Exposure target: AUC24 (range)400-600 mg/L.hr   AUC24,ss: 511 mg/L.hr  Probability of AUC24 > 400: 77 %  Ctrough,ss: 16.9 mg/L  Probability of Ctrough,ss > 20: 33 %  Probability of nephrotoxicity (Lodise ERNESTO ): 13 %        Plan:  Start vancomycin  750 mg IV q12h.   Vancomycin monitoring method: AUC  Vancomycin therapeutic monitoring goal: 400-600 mg*h/L  Pharmacy will check vancomycin levels as appropriate in 1-3 Days.    Serum creatinine levels will be ordered every 48 hours.      Rose Pepe [facility]    "

## 2024-10-19 NOTE — PROVIDER NOTIFICATION
"    MD Notification     Notified Person: MD     Notified Person Name: Malou     Notification Date/Time: 10-18-24, 2208     Notification Interaction:  joseph     Purpose of Notification: Another thing, there are no orders regarding his J and G tube or tube feeds, should he be on tube feeds?    Orders Received: Comments: \"  I put a nutrition consult for tube feedings. Otherwise, it's probably okay to just restart it tomorrow.\"        "

## 2024-10-19 NOTE — PLAN OF CARE
DATE & TIME: 10/19/24, 8615-6982  Cognitive Concerns/Orientation: HECTOR nonverbal responds with thumbs up and down   BEHAVIOR & AGGRESSION TOOL COLOR: Green  CIWA SCORE: NA   ABNL VS/O2: VSS on RA  MOBILITY: Ax2 lift, T&R Q2  PAIN MANAGMENT: PAINAID 0, 0, 0  DIET: NPO, tube feed started at 45mL/hr. Now at goal rate of 60mL/hr; 120mL FWF Q4H.  BOWEL/BLADDER: Incontinent, ext cath adequate darell output, BMx1 this shift.  ABNL LAB/BG: UA neg, chest xray pos R lung opacity, WBC 11.6. Blood cultures +.  DRAIN/DEVICES: PIVx1 on R hand SL. G/J tube  TELEMETRY RHYTHM: na  SKIN: Scat bruises, scabs, redness to sacrum, mepi in place.  TESTS/PROCEDURES: NA  D/C DAY/GOALS/PLACE: TBD  OTHER IMPORTANT INFO: PRN hydrocortisone cream for rash to face that extends to neck.

## 2024-10-19 NOTE — CONSULTS
"CLINICAL NUTRITION SERVICES  -  ASSESSMENT NOTE      Recommendations Ordered by Registered Dietitian (RD):     Nutrition Support Enteral:  Type of Feeding Tube: GJ tube - feed into J-port  Enteral Frequency:  Continuous  Enteral Regimen: Jevity 1.5 @ 60 mL/hr (hold for 1 hr before and 1 hr after Synthroid dose)  Total Enteral Provisions:  1980 kcal (30 kcal/kg), 85 g protein (1.3 g/kg), 1003 mL free water, 28 g fiber daily   Free Water Flush: 120 mL every 4 hrs for hydration    Resume TF at 45 mL/hr.  After 4 hrs, increase to goal.       Malnutrition:   % Weight Loss:  Wt has fluctuated -   % Intake:  No decreased intake noted  Subcutaneous Fat Loss:  Low baseline stores   Muscle Loss:  Low baseline stores   Fluid Retention:  None noted    Malnutrition Diagnosis: Patient does not meet two of the above criteria necessary for diagnosing malnutrition        REASON FOR ASSESSMENT  Keyon Farias is a 64 year old male seen by Registered Dietitian for Provider Order - Registered Dietitian to Assess and Order TF per Medical Nutrition Therapy Protocol    Admitted with aspiration pneumonia       NUTRITION HISTORY  Chart reviewed  Pt very well known from multiple previous admissions for the same dx    Known GJT with reliance on TF (GJ tube last exchanged 8/5/24)  Via JTube   Jevity 1.5 @ 60 mL/hr x 22 hours (hold for Synthroid)  Provides 1320 mL, 1980 kcal (30 kcal/kg), 85 g protein (1.3 g/kg), 1003 mL free water, 28 g fiber daily.   Free Water Flush: 120 mL q 4 hours for hydration and patency    Pt nodded head \"yes\" when asked if he continues to do his usual TF at home      CURRENT NUTRITION ORDERS  Diet Order:     NPO      NUTRITION FOCUSED PHYSICAL ASSESSMENT FOR DIAGNOSING MALNUTRITION)    No:  Pt known to have low baseline fat/muscle stores                   Obtained from Chart/Interdisciplinary Team:  Bed bound    ANTHROPOMETRICS  Height: 5'10\"  Weight:(10/19) 65.5 kg / 144 lbs 6.42 oz  Body mass index is 20.72 " kg/m .  Weight Status:  Normal BMI  Weight History:   Wt Readings from Last 10 Encounters:   10/19/24 65.5 kg (144 lb 6.4 oz)   09/18/24 68.1 kg (150 lb 2.1 oz)   08/31/24 67 kg (147 lb 11.3 oz)   08/05/24 67.6 kg (149 lb)   07/24/24 67.9 kg (149 lb 11.1 oz)   07/12/24 69.4 kg (153 lb)   06/04/24 74.8 kg (165 lb)   05/05/24 75.1 kg (165 lb 9.1 oz)   04/20/24 71.2 kg (156 lb 15.6 oz)   03/25/24 68.9 kg (151 lb 14.4 oz)         LABS  Labs reviewed    MEDICATIONS  Multivitamin  Vit B12  Vit C  Vit D3  Daily Neutr-Phos  Synthroid once daily  Miralax daily       ASSESSED NUTRITION NEEDS PER APPROVED PRACTICE GUIDELINES:    Dosing Weight 65.5 kg  Estimated Energy Needs: [id number] kcals (25-30 Kcal/Kg)  Justification: maintenance  Estimated Protein Needs:  grams protein (1.2-1.5 g pro/Kg)  Justification: maintenance  Estimated Fluid Needs: 0146-5266  mL (1 mL/Kcal)  Justification: maintenance    MALNUTRITION:  % Weight Loss:  Wt has fluctuated -   % Intake:  No decreased intake noted  Subcutaneous Fat Loss:  Low baseline stores   Muscle Loss:  Low baseline stores   Fluid Retention:  None noted    Malnutrition Diagnosis: Patient does not meet two of the above criteria necessary for diagnosing malnutrition      NUTRITION DIAGNOSIS:  No nutrition diagnosis identified at this time      NUTRITION INTERVENTIONS  Recommendations / Nutrition Prescription  NPO      Nutrition Support Enteral:  Type of Feeding Tube: GJ tube - feed into J-port  Enteral Frequency:  Continuous  Enteral Regimen: Jevity 1.5 @ 60 mL/hr (hold for 1 hr before and 1 hr after Synthroid dose)  Total Enteral Provisions:  1980 kcal (30 kcal/kg), 85 g protein (1.3 g/kg), 1003 mL free water, 28 g fiber daily   Free Water Flush: 120 mL every 4 hrs for hydration    Resume TF at 45 mL/hr.  After 4 hrs, increase to goal.  .      Implementation  Nutrition education: No education needs assessed at this time  EN Composition and EN Schedule: orders completed in  EPIC  .      Nutrition Goals  EN to meet nutrition needs  .      MONITORING AND EVALUATION:  Progress towards goals will be monitored and evaluated per protocol and Practice Guidelines

## 2024-10-19 NOTE — PROVIDER NOTIFICATION
MD Notification    Notified Person: MD    Notified Person Name: Malou    Notification Date/Time: 10-18-24, 2030    Notification Interaction:  vocera    Purpose of Notification: missing meds    carBAMazepine (TEGRETOL) 100 MG/5ML suspension  Looking for this med, tegretol 7.5mL (150mg) per suspension at midnight, missed both day doses ,and mom requesting hydrocortisol cream face as he is breaking out.    Orders Received: Comments: tegretol added

## 2024-10-19 NOTE — PLAN OF CARE
Goal Outcome Evaluation:         DATE & TIME: 10-19-24, 9293-7774   Cognitive Concerns/ Orientation : HECTOR nonverbal responds with thumbs up and down   BEHAVIOR & AGGRESSION TOOL COLOR: green  CIWA SCORE: na   ABNL VS/O2: VSS on RA  MOBILITY: A x 2 lift, t/repo q 2  PAIN MANAGMENT: PAINAID 0, 0, 0  DIET: NPO, tube feeds to start tomorrow, nutrition consult  BOWEL/BLADDER: incontinent, ext cath, multiple BMs  ABNL LAB/BG: UA neg, chest xray pos R lung opacity, WBC 14.2  DRAIN/DEVICES: IV SL, G/J tube  TELEMETRY RHYTHM: na  SKIN: scat bruises, scabs, redness to sacrum  TESTS/PROCEDURES: na  D/C DAY/GOALS/PLACE: TBD  OTHER IMPORTANT INFO: face washed, PRN hydrocortisone cream x 1. PRN neb given. SW, nutrition consults. UA taken and sent in, negative.

## 2024-10-19 NOTE — PROVIDER NOTIFICATION
MD Moffett-Jonny updated on blood culture results for Staph species.   Vanco had been previously ordered.

## 2024-10-20 LAB
ANION GAP SERPL CALCULATED.3IONS-SCNC: 11 MMOL/L (ref 7–15)
BASOPHILS # BLD AUTO: 0.1 10E3/UL (ref 0–0.2)
BASOPHILS NFR BLD AUTO: 1 %
BUN SERPL-MCNC: 15 MG/DL (ref 8–23)
CALCIUM SERPL-MCNC: 7.7 MG/DL (ref 8.8–10.4)
CHLORIDE SERPL-SCNC: 108 MMOL/L (ref 98–107)
CREAT SERPL-MCNC: 0.97 MG/DL (ref 0.67–1.17)
CREAT SERPL-MCNC: 0.98 MG/DL (ref 0.67–1.17)
EGFRCR SERPLBLD CKD-EPI 2021: 87 ML/MIN/1.73M2
EGFRCR SERPLBLD CKD-EPI 2021: 88 ML/MIN/1.73M2
EOSINOPHIL # BLD AUTO: 0.3 10E3/UL (ref 0–0.7)
EOSINOPHIL NFR BLD AUTO: 4 %
ERYTHROCYTE [DISTWIDTH] IN BLOOD BY AUTOMATED COUNT: 15.2 % (ref 10–15)
GLUCOSE SERPL-MCNC: 108 MG/DL (ref 70–99)
HCO3 SERPL-SCNC: 24 MMOL/L (ref 22–29)
HCT VFR BLD AUTO: 38.4 % (ref 40–53)
HGB BLD-MCNC: 12.2 G/DL (ref 13.3–17.7)
IMM GRANULOCYTES # BLD: 0 10E3/UL
IMM GRANULOCYTES NFR BLD: 0 %
LYMPHOCYTES # BLD AUTO: 1.1 10E3/UL (ref 0.8–5.3)
LYMPHOCYTES NFR BLD AUTO: 11 %
MCH RBC QN AUTO: 29 PG (ref 26.5–33)
MCHC RBC AUTO-ENTMCNC: 31.8 G/DL (ref 31.5–36.5)
MCV RBC AUTO: 91 FL (ref 78–100)
MONOCYTES # BLD AUTO: 0.8 10E3/UL (ref 0–1.3)
MONOCYTES NFR BLD AUTO: 8 %
NEUTROPHILS # BLD AUTO: 7 10E3/UL (ref 1.6–8.3)
NEUTROPHILS NFR BLD AUTO: 76 %
NRBC # BLD AUTO: 0 10E3/UL
NRBC BLD AUTO-RTO: 0 /100
PLATELET # BLD AUTO: 153 10E3/UL (ref 150–450)
POTASSIUM SERPL-SCNC: 3.8 MMOL/L (ref 3.4–5.3)
RBC # BLD AUTO: 4.21 10E6/UL (ref 4.4–5.9)
SODIUM SERPL-SCNC: 143 MMOL/L (ref 135–145)
WBC # BLD AUTO: 9.2 10E3/UL (ref 4–11)

## 2024-10-20 PROCEDURE — 82565 ASSAY OF CREATININE: CPT | Performed by: HOSPITALIST

## 2024-10-20 PROCEDURE — 36415 COLL VENOUS BLD VENIPUNCTURE: CPT | Performed by: HOSPITALIST

## 2024-10-20 PROCEDURE — 250N000011 HC RX IP 250 OP 636: Performed by: INTERNAL MEDICINE

## 2024-10-20 PROCEDURE — 258N000003 HC RX IP 258 OP 636: Performed by: HOSPITALIST

## 2024-10-20 PROCEDURE — 250N000009 HC RX 250: Performed by: INTERNAL MEDICINE

## 2024-10-20 PROCEDURE — 94640 AIRWAY INHALATION TREATMENT: CPT | Mod: 76

## 2024-10-20 PROCEDURE — 99232 SBSQ HOSP IP/OBS MODERATE 35: CPT | Performed by: HOSPITALIST

## 2024-10-20 PROCEDURE — 94640 AIRWAY INHALATION TREATMENT: CPT

## 2024-10-20 PROCEDURE — 120N000001 HC R&B MED SURG/OB

## 2024-10-20 PROCEDURE — 250N000011 HC RX IP 250 OP 636: Performed by: HOSPITALIST

## 2024-10-20 PROCEDURE — 80048 BASIC METABOLIC PNL TOTAL CA: CPT | Performed by: HOSPITALIST

## 2024-10-20 PROCEDURE — 250N000013 HC RX MED GY IP 250 OP 250 PS 637: Performed by: INTERNAL MEDICINE

## 2024-10-20 PROCEDURE — 85025 COMPLETE CBC W/AUTO DIFF WBC: CPT | Performed by: HOSPITALIST

## 2024-10-20 PROCEDURE — 999N000157 HC STATISTIC RCP TIME EA 10 MIN

## 2024-10-20 RX ADMIN — SODIUM CHLORIDE TAB 1 GM 1 G: 1 TAB at 20:45

## 2024-10-20 RX ADMIN — Medication 40 MG: at 20:45

## 2024-10-20 RX ADMIN — MULTIVITAMIN TABLET 1 TABLET: TABLET at 09:49

## 2024-10-20 RX ADMIN — PIPERACILLIN AND TAZOBACTAM 3.38 G: 3; .375 INJECTION, POWDER, FOR SOLUTION INTRAVENOUS at 04:33

## 2024-10-20 RX ADMIN — ALBUTEROL SULFATE 2.5 MG: 2.5 SOLUTION RESPIRATORY (INHALATION) at 15:22

## 2024-10-20 RX ADMIN — Medication 40 MG: at 09:57

## 2024-10-20 RX ADMIN — METOCLOPRAMIDE HYDROCHLORIDE 10 MG: 5 SOLUTION ORAL at 20:46

## 2024-10-20 RX ADMIN — OXYCODONE HYDROCHLORIDE AND ACETAMINOPHEN 3000 MG: 500 TABLET ORAL at 09:49

## 2024-10-20 RX ADMIN — PIPERACILLIN AND TAZOBACTAM 3.38 G: 3; .375 INJECTION, POWDER, FOR SOLUTION INTRAVENOUS at 15:57

## 2024-10-20 RX ADMIN — CARBAMAZEPINE 150 MG: 100 SUSPENSION ORAL at 05:41

## 2024-10-20 RX ADMIN — HYDROCORTISONE 15 MG: 10 TABLET ORAL at 09:57

## 2024-10-20 RX ADMIN — GLYCOPYRROLATE 2 MG: 1 TABLET ORAL at 09:49

## 2024-10-20 RX ADMIN — METOCLOPRAMIDE HYDROCHLORIDE 10 MG: 5 SOLUTION ORAL at 13:24

## 2024-10-20 RX ADMIN — ALBUTEROL SULFATE 2.5 MG: 2.5 SOLUTION RESPIRATORY (INHALATION) at 20:19

## 2024-10-20 RX ADMIN — SODIUM CHLORIDE TAB 1 GM 1 G: 1 TAB at 09:57

## 2024-10-20 RX ADMIN — ACETYLCYSTEINE 2 ML: 200 SOLUTION ORAL; RESPIRATORY (INHALATION) at 15:22

## 2024-10-20 RX ADMIN — PIPERACILLIN AND TAZOBACTAM 3.38 G: 3; .375 INJECTION, POWDER, FOR SOLUTION INTRAVENOUS at 21:04

## 2024-10-20 RX ADMIN — VANCOMYCIN HYDROCHLORIDE 750 MG: 500 INJECTION, POWDER, LYOPHILIZED, FOR SOLUTION INTRAVENOUS at 05:25

## 2024-10-20 RX ADMIN — ALBUTEROL SULFATE 2.5 MG: 2.5 SOLUTION RESPIRATORY (INHALATION) at 07:19

## 2024-10-20 RX ADMIN — METOCLOPRAMIDE HYDROCHLORIDE 10 MG: 5 SOLUTION ORAL at 09:57

## 2024-10-20 RX ADMIN — LEVOTHYROXINE SODIUM 100 MCG: 100 TABLET ORAL at 05:41

## 2024-10-20 RX ADMIN — BRIVARACETAM 100 MG: 10 SOLUTION ORAL at 21:03

## 2024-10-20 RX ADMIN — Medication 100 MCG: at 09:50

## 2024-10-20 RX ADMIN — ALBUTEROL SULFATE 2.5 MG: 2.5 SOLUTION RESPIRATORY (INHALATION) at 11:00

## 2024-10-20 RX ADMIN — POLYETHYLENE GLYCOL 3350 17 G: 17 POWDER, FOR SOLUTION ORAL at 09:49

## 2024-10-20 RX ADMIN — BRIVARACETAM 100 MG: 10 SOLUTION ORAL at 10:00

## 2024-10-20 RX ADMIN — CARBAMAZEPINE 100 MG: 100 SUSPENSION ORAL at 18:39

## 2024-10-20 RX ADMIN — PIPERACILLIN AND TAZOBACTAM 3.38 G: 3; .375 INJECTION, POWDER, FOR SOLUTION INTRAVENOUS at 09:49

## 2024-10-20 RX ADMIN — CYANOCOBALAMIN TAB 1000 MCG 1000 MCG: 1000 TAB at 09:50

## 2024-10-20 RX ADMIN — CARBAMAZEPINE 150 MG: 100 SUSPENSION ORAL at 13:24

## 2024-10-20 RX ADMIN — GLYCOPYRROLATE 2 MG: 1 TABLET ORAL at 20:45

## 2024-10-20 RX ADMIN — Medication 1 PACKET: at 09:50

## 2024-10-20 RX ADMIN — ACETYLCYSTEINE 2 ML: 200 SOLUTION ORAL; RESPIRATORY (INHALATION) at 07:19

## 2024-10-20 RX ADMIN — ACETYLCYSTEINE 2 ML: 200 SOLUTION ORAL; RESPIRATORY (INHALATION) at 11:00

## 2024-10-20 RX ADMIN — METOCLOPRAMIDE HYDROCHLORIDE 10 MG: 5 SOLUTION ORAL at 15:57

## 2024-10-20 RX ADMIN — ACETYLCYSTEINE 2 ML: 200 SOLUTION ORAL; RESPIRATORY (INHALATION) at 20:19

## 2024-10-20 RX ADMIN — HYDROCORTISONE 7.5 MG: 5 TABLET ORAL at 13:24

## 2024-10-20 RX ADMIN — HYDROCORTISONE: 1 CREAM TOPICAL at 10:20

## 2024-10-20 ASSESSMENT — ACTIVITIES OF DAILY LIVING (ADL)
ADLS_ACUITY_SCORE: 63
DEPENDENT_IADLS:: CLEANING;COOKING;LAUNDRY;SHOPPING;MEAL PREPARATION;MEDICATION MANAGEMENT;MONEY MANAGEMENT;TRANSPORTATION;INCONTINENCE
ADLS_ACUITY_SCORE: 63

## 2024-10-20 NOTE — PLAN OF CARE
Problem: Adult Inpatient Plan of Care  Goal: Plan of Care Review  Description: The Plan of Care Review/Shift note should be completed every shift.  The Outcome Evaluation is a brief statement about your assessment that the patient is improving, declining, or no change.  This information will be displayed automatically on your shift  note.  Outcome: Progressing     Problem: Pneumonia  Goal: Effective Oxygenation and Ventilation  Intervention: Optimize Oxygenation and Ventilation  Recent Flowsheet Documentation  Taken 10/20/2024 1226 by Alee Levy, RN  Head of Bed (HOB) Positioning: HOB at 30 degrees   Goal Outcome Evaluation:       Pt having moderate amount of green phlegm.  Patient had one emesis post swabbing mucous from the back of his mouth.  Patient had one large liquid stool. No signs of pain noted.  Bed bath given, lotion applied.  Full bed change completed as well.  IV antibiotics being given for aspiration pneumonia.  GJ tube patent, continuous tube feeding infusing.  No concerns at this time.
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
## 2024-10-20 NOTE — PROGRESS NOTES
Summary: Presents with fevers and cough.  Orientation: A/O to self    Vitals/Tele: VSS on RA. Infrequent, congested, cough.    IV Access/drains:  R PIV SL, G/J tube, CDI    Diet: Tube feed rate @60mL/hr. 120mL FWF Q4H. Crush all meds.    Mobility: A/O x 2, lift, T/R    GI/: Incontinent of B/B, ext cath in place. 2x BM this shift.    Wound/Skin: Redness of coccyx with mepilex in place. Scattered and scab bruising.    Consults:   Pt has lab on Monday    Discharge Plan: TBD      See Flow sheets for assessment

## 2024-10-20 NOTE — PLAN OF CARE
DATE & TIME: 10/19/24 Evenings   Cognitive Concerns/ Orientation: HECTOR nonverbal responds with thumbs up and down. Alert during the day and tracking people in room.   BEHAVIOR & AGGRESSION TOOL COLOR: Green  CIWA SCORE: NA   ABNL VS/O2: VSS on RA. Congested cough and clear.   MOBILITY: Ax2 lift, T&R Q2  PAIN MANAGMENT: Denies   DIET: NPO, tube feed at goal rate 60mL/hr.. 120mL FWF Q4H. All meds crushed or solutions in Gtube.   BOWEL/BLADDER: Incontinent, ext catheter in place. No BM this shift.   ABNL LAB/BG: UA neg, chest xray pos R lung opacity, WBC 11.6. Blood cultures + for Gram + Cocci in clusters, Staph species. MD paged. Vanco ordered.   DRAIN/DEVICES: PIVx1 on R hand SL. G/J tube  TELEMETRY RHYTHM: na  SKIN: Scat bruises, scabs, redness to sacrum, mepi in place.  TESTS/PROCEDURES: Monday labs   D/C DAY/GOALS/PLACE: TBD  OTHER IMPORTANT INFO: PRN hydrocortisone cream for rash to face that extends to neck.

## 2024-10-20 NOTE — PLAN OF CARE
Goal Outcome Evaluation:      Plan of Care Reviewed With: parent    Overall Patient Progress: improvingOverall Patient Progress: improving    Outcome Evaluation: PT will discharge home when medically ready.

## 2024-10-21 LAB
ANION GAP SERPL CALCULATED.3IONS-SCNC: 8 MMOL/L (ref 7–15)
BACTERIA BLD CULT: ABNORMAL
BACTERIA BLD CULT: ABNORMAL
BUN SERPL-MCNC: 12.2 MG/DL (ref 8–23)
CALCIUM SERPL-MCNC: 8.2 MG/DL (ref 8.8–10.4)
CHLORIDE SERPL-SCNC: 104 MMOL/L (ref 98–107)
CREAT SERPL-MCNC: 0.99 MG/DL (ref 0.67–1.17)
EGFRCR SERPLBLD CKD-EPI 2021: 86 ML/MIN/1.73M2
GLUCOSE BLDC GLUCOMTR-MCNC: 148 MG/DL (ref 70–99)
GLUCOSE SERPL-MCNC: 85 MG/DL (ref 70–99)
HCO3 SERPL-SCNC: 28 MMOL/L (ref 22–29)
MAGNESIUM SERPL-MCNC: 2.3 MG/DL (ref 1.7–2.3)
PHOSPHATE SERPL-MCNC: 1.9 MG/DL (ref 2.5–4.5)
POTASSIUM SERPL-SCNC: 3.9 MMOL/L (ref 3.4–5.3)
SODIUM SERPL-SCNC: 140 MMOL/L (ref 135–145)

## 2024-10-21 PROCEDURE — 250N000011 HC RX IP 250 OP 636: Performed by: HOSPITALIST

## 2024-10-21 PROCEDURE — 83735 ASSAY OF MAGNESIUM: CPT | Performed by: INTERNAL MEDICINE

## 2024-10-21 PROCEDURE — 84100 ASSAY OF PHOSPHORUS: CPT | Performed by: INTERNAL MEDICINE

## 2024-10-21 PROCEDURE — 999N000157 HC STATISTIC RCP TIME EA 10 MIN

## 2024-10-21 PROCEDURE — 999N000128 HC STATISTIC PERIPHERAL IV START W/O US GUIDANCE

## 2024-10-21 PROCEDURE — 250N000009 HC RX 250: Performed by: INTERNAL MEDICINE

## 2024-10-21 PROCEDURE — 250N000013 HC RX MED GY IP 250 OP 250 PS 637: Performed by: INTERNAL MEDICINE

## 2024-10-21 PROCEDURE — 94640 AIRWAY INHALATION TREATMENT: CPT | Mod: 76

## 2024-10-21 PROCEDURE — 250N000013 HC RX MED GY IP 250 OP 250 PS 637: Performed by: HOSPITALIST

## 2024-10-21 PROCEDURE — 80048 BASIC METABOLIC PNL TOTAL CA: CPT | Performed by: INTERNAL MEDICINE

## 2024-10-21 PROCEDURE — 120N000001 HC R&B MED SURG/OB

## 2024-10-21 PROCEDURE — 36415 COLL VENOUS BLD VENIPUNCTURE: CPT | Performed by: INTERNAL MEDICINE

## 2024-10-21 PROCEDURE — 99232 SBSQ HOSP IP/OBS MODERATE 35: CPT | Performed by: HOSPITALIST

## 2024-10-21 PROCEDURE — 94640 AIRWAY INHALATION TREATMENT: CPT

## 2024-10-21 PROCEDURE — 250N000011 HC RX IP 250 OP 636: Performed by: INTERNAL MEDICINE

## 2024-10-21 RX ORDER — AMPICILLIN AND SULBACTAM 2; 1 G/1; G/1
3 INJECTION, POWDER, FOR SOLUTION INTRAMUSCULAR; INTRAVENOUS EVERY 6 HOURS
Status: DISCONTINUED | OUTPATIENT
Start: 2024-10-21 | End: 2024-10-25 | Stop reason: HOSPADM

## 2024-10-21 RX ADMIN — ACETYLCYSTEINE 2 ML: 200 SOLUTION ORAL; RESPIRATORY (INHALATION) at 15:28

## 2024-10-21 RX ADMIN — GLYCOPYRROLATE 2 MG: 1 TABLET ORAL at 10:08

## 2024-10-21 RX ADMIN — METOCLOPRAMIDE HYDROCHLORIDE 10 MG: 5 SOLUTION ORAL at 16:05

## 2024-10-21 RX ADMIN — ALBUTEROL SULFATE 2.5 MG: 2.5 SOLUTION RESPIRATORY (INHALATION) at 15:28

## 2024-10-21 RX ADMIN — PIPERACILLIN AND TAZOBACTAM 3.38 G: 3; .375 INJECTION, POWDER, FOR SOLUTION INTRAVENOUS at 03:05

## 2024-10-21 RX ADMIN — ALBUTEROL SULFATE 2.5 MG: 2.5 SOLUTION RESPIRATORY (INHALATION) at 07:57

## 2024-10-21 RX ADMIN — SODIUM CHLORIDE TAB 1 GM 1 G: 1 TAB at 10:14

## 2024-10-21 RX ADMIN — ACETYLCYSTEINE 2 ML: 200 SOLUTION ORAL; RESPIRATORY (INHALATION) at 19:23

## 2024-10-21 RX ADMIN — METOCLOPRAMIDE HYDROCHLORIDE 10 MG: 5 SOLUTION ORAL at 20:44

## 2024-10-21 RX ADMIN — HYDROCORTISONE 7.5 MG: 5 TABLET ORAL at 13:22

## 2024-10-21 RX ADMIN — AMPICILLIN SODIUM AND SULBACTAM SODIUM 3 G: 2; 1 INJECTION, POWDER, FOR SOLUTION INTRAMUSCULAR; INTRAVENOUS at 16:04

## 2024-10-21 RX ADMIN — MICONAZOLE NITRATE: 2 POWDER TOPICAL at 06:45

## 2024-10-21 RX ADMIN — LEVOTHYROXINE SODIUM 100 MCG: 100 TABLET ORAL at 06:47

## 2024-10-21 RX ADMIN — SODIUM CHLORIDE TAB 1 GM 1 G: 1 TAB at 20:25

## 2024-10-21 RX ADMIN — ACETYLCYSTEINE 2 ML: 200 SOLUTION ORAL; RESPIRATORY (INHALATION) at 10:59

## 2024-10-21 RX ADMIN — GLYCOPYRROLATE 2 MG: 1 TABLET ORAL at 20:25

## 2024-10-21 RX ADMIN — CARBAMAZEPINE 150 MG: 100 SUSPENSION ORAL at 14:23

## 2024-10-21 RX ADMIN — METOCLOPRAMIDE HYDROCHLORIDE 10 MG: 5 SOLUTION ORAL at 09:57

## 2024-10-21 RX ADMIN — MULTIVITAMIN TABLET 1 TABLET: TABLET at 10:07

## 2024-10-21 RX ADMIN — CYANOCOBALAMIN TAB 1000 MCG 1000 MCG: 1000 TAB at 10:07

## 2024-10-21 RX ADMIN — POTASSIUM & SODIUM PHOSPHATES POWDER PACK 280-160-250 MG 2 PACKET: 280-160-250 PACK at 16:04

## 2024-10-21 RX ADMIN — BRIVARACETAM 100 MG: 10 SOLUTION ORAL at 10:06

## 2024-10-21 RX ADMIN — METOCLOPRAMIDE HYDROCHLORIDE 10 MG: 5 SOLUTION ORAL at 13:22

## 2024-10-21 RX ADMIN — CARBAMAZEPINE 100 MG: 100 SUSPENSION ORAL at 17:40

## 2024-10-21 RX ADMIN — CARBAMAZEPINE 150 MG: 100 SUSPENSION ORAL at 01:17

## 2024-10-21 RX ADMIN — POLYETHYLENE GLYCOL 3350 17 G: 17 POWDER, FOR SOLUTION ORAL at 10:05

## 2024-10-21 RX ADMIN — ALBUTEROL SULFATE 2.5 MG: 2.5 SOLUTION RESPIRATORY (INHALATION) at 19:23

## 2024-10-21 RX ADMIN — ALBUTEROL SULFATE 2.5 MG: 2.5 SOLUTION RESPIRATORY (INHALATION) at 10:59

## 2024-10-21 RX ADMIN — POTASSIUM & SODIUM PHOSPHATES POWDER PACK 280-160-250 MG 2 PACKET: 280-160-250 PACK at 20:44

## 2024-10-21 RX ADMIN — ACETYLCYSTEINE 2 ML: 200 SOLUTION ORAL; RESPIRATORY (INHALATION) at 07:57

## 2024-10-21 RX ADMIN — HYDROCORTISONE 15 MG: 10 TABLET ORAL at 10:13

## 2024-10-21 RX ADMIN — CARBAMAZEPINE 150 MG: 100 SUSPENSION ORAL at 22:30

## 2024-10-21 RX ADMIN — Medication 1 PACKET: at 10:05

## 2024-10-21 RX ADMIN — BRIVARACETAM 100 MG: 10 SOLUTION ORAL at 23:32

## 2024-10-21 RX ADMIN — Medication 40 MG: at 20:44

## 2024-10-21 RX ADMIN — Medication 100 MCG: at 13:22

## 2024-10-21 RX ADMIN — MICONAZOLE NITRATE: 2 POWDER TOPICAL at 10:13

## 2024-10-21 RX ADMIN — PIPERACILLIN AND TAZOBACTAM 3.38 G: 3; .375 INJECTION, POWDER, FOR SOLUTION INTRAVENOUS at 10:08

## 2024-10-21 RX ADMIN — OXYCODONE HYDROCHLORIDE AND ACETAMINOPHEN 3000 MG: 500 TABLET ORAL at 10:07

## 2024-10-21 RX ADMIN — Medication 40 MG: at 10:13

## 2024-10-21 RX ADMIN — AMPICILLIN SODIUM AND SULBACTAM SODIUM 3 G: 2; 1 INJECTION, POWDER, FOR SOLUTION INTRAMUSCULAR; INTRAVENOUS at 21:29

## 2024-10-21 RX ADMIN — CARBAMAZEPINE 150 MG: 100 SUSPENSION ORAL at 06:45

## 2024-10-21 ASSESSMENT — ACTIVITIES OF DAILY LIVING (ADL)
ADLS_ACUITY_SCORE: 63
ADLS_ACUITY_SCORE: 63
ADLS_ACUITY_SCORE: 59
ADLS_ACUITY_SCORE: 63
ADLS_ACUITY_SCORE: 59
ADLS_ACUITY_SCORE: 59
ADLS_ACUITY_SCORE: 63
ADLS_ACUITY_SCORE: 59
ADLS_ACUITY_SCORE: 59
ADLS_ACUITY_SCORE: 63
ADLS_ACUITY_SCORE: 59
ADLS_ACUITY_SCORE: 63
ADLS_ACUITY_SCORE: 59
ADLS_ACUITY_SCORE: 63

## 2024-10-21 NOTE — PLAN OF CARE
Cognitive Concerns/ Orientation: HECTOR orientation, calm; nonverbal;  responds with thumbs up and down to questions.  BEHAVIOR & AGGRESSION TOOL COLOR: Green  ABNL VS/O2: VSS on RA.   MOBILITY: Ax2 lift, T&R Q 2 hrs  PAIN MANAGMENT: No non verbal indicators of pain noted.  DIET: NPO, tube feed at goal rate 60mL/hr.. 120mL flushes Q 4 hrs.  Meds crushed or solutions through g-tube. Order to hold TF 1 hr before and after Synthroid dose.  BOWEL/BLADDER: Incontinent, no BM this shift.   DRAIN/DEVICES: Lost IV access, vascular access consult ordered for new PIV; G/J tube.  SKIN: Scattered bruises and scabs, redness to sacrum  D/C DAY/GOALS/PLACE: TBD  OTHER IMPORTANT INFO: Contact precautions maintained.

## 2024-10-21 NOTE — PROGRESS NOTES
Antimicrobial Stewardship Team Note    Antimicrobial Stewardship Program - A joint venture between [facility] and [facility] to optimize antibiotic management.     Patient: Keyon Farias  MRN: [id number]  Allergies: Valproic acid, Scopolamine, Vancomycin, and Phenytoin sodium     Brief Summary: Keyon Farias is a 64 year old male with past medical history of TBI with aphasia, R sided spastic hemiplegia and dysphagia with GJ tube, seizure disorder, panhypopituitarism and frequent hospital admissions for recurrent pneumonia.  Patient was admitted on 10/18/2024 for further evaluation of fevers and cough similar to previous episodes of aspiration pneumonia.      History of Present Illness: Patient presented from home 10/18 with concerns of fatigue, fever and congestion.  On initial evaluation the patient had an elevated temperature (100.2), soft blood pressures in the 90s (94/66), elevated WBC count (14.2) and was tachycardic (106) and O2 saturation in the low 90s (92-93) on room air.  10/18 Chest X-Ray showed an increased medial/basilar opacity in the right lung that could represent atelectasis, aspiration or developing pneumonia.  The left lung was clear and there was no pleural effusion or pneumothorax.  10/18 Blood cultures grow Staphylococcus hominis in   bottles, representing a likely contaminant.  Influenza/RSV/Covid all negative.  The patient has been on Zosyn since 10/18 and received two days of vancomycin 10/19 - 10/20.         Active Anti-infective Medications   (From admission, onward)                 Start     Stop    10/18/24 2200  piperacillin-tazobactam  3.375 g,   Intravenous,   EVERY 6 HOURS        Aspiration Pneumonia       --                  Assessment: Aspiration Pneumonia  Patient presented with leukocytosis, tachycardia and soft blood pressures.  Chest X-Ray showed an increased medial/basilar opacity in the right lung that could represent a developing pneumonia.   The patient is clinically improved and currently stable with WBC decreasing and within normal limits, increased blood pressure (116/73), decreasing pulse (85) and now O2 saturating at 98% on room air.  Recommend stopping Zosyn and de-escalating to Unasyn 3 g every 6 hours or Augmentin 875 mg twice daily to complete a 5 day course of antibiotics.      Recommendation:  Stop Zosyn  2.    Initiate Unasyn 3 g every 6 hours or Augmentin 875 mg BID to complete a 5 day          course of antibiotics (10/23).      Discussed with ID Staff  MD Mayela Angelo, PharmD, BCIDP  Ciro Mcmullen, PharmD

## 2024-10-21 NOTE — PLAN OF CARE
Cognitive Concerns/ Orientation: HECTOR nonverbal responds with thumbs up and down.   BEHAVIOR & AGGRESSION TOOL COLOR: Green  ABNL VS/O2: VSS on RA. Congested cough   MOBILITY: Ax2 lift, T&R Q2  PAIN MANAGMENT: Denies   DIET: NPO, tube feed at goal rate 60mL/hr. 120mL FWF Q4H. All meds crushed or solutions in Gtube.   BOWEL/BLADDER: Incontinent, ext catheter in place. 1 small BM during shift  DRAIN/DEVICES: PIV SL. G/J tube  SKIN: Scat bruises, scabs, redness to sacrum  D/C DAY/GOALS/PLACE: TBD
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
## 2024-10-21 NOTE — PLAN OF CARE
10- AM shift  Cognitive Concerns/ Orientation: HECTOR orientation, calm; moans and makes some sounds;  able to responds with thumbs up and down to questions. Smiles when feels good.   BEHAVIOR & AGGRESSION TOOL COLOR: Green  ABNL VS/O2: VSS on RA.   MOBILITY: Ax2 lift, T&R Q 2 hrs  PAIN MANAGMENT: No non verbal indicators of pain noted. Gets restless in bed if wet or had BM.   DIET: NPO, tube feed at goal rate 60mL/hr. 120mL flushes Q 4 hrs.  Meds crushed or solutions through g-tube. Order to hold TF 1 hr before and after Synthroid dose.  BOWEL/BLADDER: Incontinent, good urine output. BM x 2-loose watery.   DRAIN/DEVICES:New PIV SL; interm IV abx  SKIN: Scattered bruises and scabs, redness to sacrum-mepi in place.   D/C DAY/GOALS/PLACE: Pending, ID is following.   OTHER IMPORTANT INFO: Contact precautions maintained. Mom visited this shift.

## 2024-10-22 LAB
ANION GAP SERPL CALCULATED.3IONS-SCNC: 9 MMOL/L (ref 7–15)
BASOPHILS # BLD AUTO: 0 10E3/UL (ref 0–0.2)
BASOPHILS NFR BLD AUTO: 1 %
BUN SERPL-MCNC: 11.4 MG/DL (ref 8–23)
CALCIUM SERPL-MCNC: 8.3 MG/DL (ref 8.8–10.4)
CHLORIDE SERPL-SCNC: 100 MMOL/L (ref 98–107)
CREAT SERPL-MCNC: 0.84 MG/DL (ref 0.67–1.17)
CREAT SERPL-MCNC: 0.84 MG/DL (ref 0.67–1.17)
EGFRCR SERPLBLD CKD-EPI 2021: >90 ML/MIN/1.73M2
EGFRCR SERPLBLD CKD-EPI 2021: >90 ML/MIN/1.73M2
EOSINOPHIL # BLD AUTO: 0.3 10E3/UL (ref 0–0.7)
EOSINOPHIL NFR BLD AUTO: 5 %
ERYTHROCYTE [DISTWIDTH] IN BLOOD BY AUTOMATED COUNT: 15.2 % (ref 10–15)
GLUCOSE BLDC GLUCOMTR-MCNC: 107 MG/DL (ref 70–99)
GLUCOSE SERPL-MCNC: 168 MG/DL (ref 70–99)
HCO3 SERPL-SCNC: 29 MMOL/L (ref 22–29)
HCT VFR BLD AUTO: 37 % (ref 40–53)
HGB BLD-MCNC: 11.8 G/DL (ref 13.3–17.7)
IMM GRANULOCYTES # BLD: 0 10E3/UL
IMM GRANULOCYTES NFR BLD: 0 %
LYMPHOCYTES # BLD AUTO: 1 10E3/UL (ref 0.8–5.3)
LYMPHOCYTES NFR BLD AUTO: 15 %
MCH RBC QN AUTO: 28.4 PG (ref 26.5–33)
MCHC RBC AUTO-ENTMCNC: 31.9 G/DL (ref 31.5–36.5)
MCV RBC AUTO: 89 FL (ref 78–100)
MONOCYTES # BLD AUTO: 0.6 10E3/UL (ref 0–1.3)
MONOCYTES NFR BLD AUTO: 9 %
NEUTROPHILS # BLD AUTO: 4.4 10E3/UL (ref 1.6–8.3)
NEUTROPHILS NFR BLD AUTO: 70 %
NRBC # BLD AUTO: 0 10E3/UL
NRBC BLD AUTO-RTO: 0 /100
PHOSPHATE SERPL-MCNC: 2.2 MG/DL (ref 2.5–4.5)
PLATELET # BLD AUTO: 171 10E3/UL (ref 150–450)
POTASSIUM SERPL-SCNC: 4.4 MMOL/L (ref 3.4–5.3)
RBC # BLD AUTO: 4.16 10E6/UL (ref 4.4–5.9)
SODIUM SERPL-SCNC: 138 MMOL/L (ref 135–145)
WBC # BLD AUTO: 6.2 10E3/UL (ref 4–11)

## 2024-10-22 PROCEDURE — 250N000009 HC RX 250: Performed by: INTERNAL MEDICINE

## 2024-10-22 PROCEDURE — 36415 COLL VENOUS BLD VENIPUNCTURE: CPT | Performed by: HOSPITALIST

## 2024-10-22 PROCEDURE — 84100 ASSAY OF PHOSPHORUS: CPT | Performed by: HOSPITALIST

## 2024-10-22 PROCEDURE — 250N000011 HC RX IP 250 OP 636: Performed by: HOSPITALIST

## 2024-10-22 PROCEDURE — 94640 AIRWAY INHALATION TREATMENT: CPT

## 2024-10-22 PROCEDURE — 999N000157 HC STATISTIC RCP TIME EA 10 MIN

## 2024-10-22 PROCEDURE — 999N000128 HC STATISTIC PERIPHERAL IV START W/O US GUIDANCE

## 2024-10-22 PROCEDURE — 250N000013 HC RX MED GY IP 250 OP 250 PS 637: Performed by: INTERNAL MEDICINE

## 2024-10-22 PROCEDURE — 120N000001 HC R&B MED SURG/OB

## 2024-10-22 PROCEDURE — 80048 BASIC METABOLIC PNL TOTAL CA: CPT | Performed by: HOSPITALIST

## 2024-10-22 PROCEDURE — 999N000040 HC STATISTIC CONSULT NO CHARGE VASC ACCESS

## 2024-10-22 PROCEDURE — 250N000013 HC RX MED GY IP 250 OP 250 PS 637: Performed by: HOSPITALIST

## 2024-10-22 PROCEDURE — 85025 COMPLETE CBC W/AUTO DIFF WBC: CPT | Performed by: HOSPITALIST

## 2024-10-22 PROCEDURE — 99232 SBSQ HOSP IP/OBS MODERATE 35: CPT | Performed by: HOSPITALIST

## 2024-10-22 PROCEDURE — 94640 AIRWAY INHALATION TREATMENT: CPT | Mod: 76

## 2024-10-22 PROCEDURE — 82565 ASSAY OF CREATININE: CPT | Performed by: HOSPITALIST

## 2024-10-22 RX ADMIN — AMPICILLIN SODIUM AND SULBACTAM SODIUM 3 G: 2; 1 INJECTION, POWDER, FOR SOLUTION INTRAMUSCULAR; INTRAVENOUS at 05:03

## 2024-10-22 RX ADMIN — HYDROCORTISONE 7.5 MG: 5 TABLET ORAL at 14:02

## 2024-10-22 RX ADMIN — MICONAZOLE NITRATE: 2 POWDER TOPICAL at 08:37

## 2024-10-22 RX ADMIN — ALBUTEROL SULFATE 2.5 MG: 2.5 SOLUTION RESPIRATORY (INHALATION) at 19:28

## 2024-10-22 RX ADMIN — GLYCOPYRROLATE 2 MG: 1 TABLET ORAL at 20:37

## 2024-10-22 RX ADMIN — ACETYLCYSTEINE 2 ML: 200 SOLUTION ORAL; RESPIRATORY (INHALATION) at 11:16

## 2024-10-22 RX ADMIN — POTASSIUM & SODIUM PHOSPHATES POWDER PACK 280-160-250 MG 1 PACKET: 280-160-250 PACK at 17:14

## 2024-10-22 RX ADMIN — CYANOCOBALAMIN TAB 1000 MCG 1000 MCG: 1000 TAB at 08:41

## 2024-10-22 RX ADMIN — AMPICILLIN SODIUM AND SULBACTAM SODIUM 3 G: 2; 1 INJECTION, POWDER, FOR SOLUTION INTRAMUSCULAR; INTRAVENOUS at 20:28

## 2024-10-22 RX ADMIN — Medication 40 MG: at 20:37

## 2024-10-22 RX ADMIN — SODIUM CHLORIDE TAB 1 GM 1 G: 1 TAB at 08:40

## 2024-10-22 RX ADMIN — OXYCODONE HYDROCHLORIDE AND ACETAMINOPHEN 3000 MG: 500 TABLET ORAL at 08:40

## 2024-10-22 RX ADMIN — HYDROCORTISONE 15 MG: 10 TABLET ORAL at 08:39

## 2024-10-22 RX ADMIN — ALBUTEROL SULFATE 2.5 MG: 2.5 SOLUTION RESPIRATORY (INHALATION) at 11:17

## 2024-10-22 RX ADMIN — BRIVARACETAM 100 MG: 10 SOLUTION ORAL at 09:43

## 2024-10-22 RX ADMIN — Medication 1 PACKET: at 08:38

## 2024-10-22 RX ADMIN — METOCLOPRAMIDE HYDROCHLORIDE 10 MG: 5 SOLUTION ORAL at 08:36

## 2024-10-22 RX ADMIN — ALBUTEROL SULFATE 2.5 MG: 2.5 SOLUTION RESPIRATORY (INHALATION) at 15:37

## 2024-10-22 RX ADMIN — METOCLOPRAMIDE HYDROCHLORIDE 10 MG: 5 SOLUTION ORAL at 14:02

## 2024-10-22 RX ADMIN — POTASSIUM & SODIUM PHOSPHATES POWDER PACK 280-160-250 MG 1 PACKET: 280-160-250 PACK at 20:37

## 2024-10-22 RX ADMIN — CARBAMAZEPINE 150 MG: 100 SUSPENSION ORAL at 14:02

## 2024-10-22 RX ADMIN — AMPICILLIN SODIUM AND SULBACTAM SODIUM 3 G: 2; 1 INJECTION, POWDER, FOR SOLUTION INTRAMUSCULAR; INTRAVENOUS at 10:57

## 2024-10-22 RX ADMIN — Medication 40 MG: at 08:19

## 2024-10-22 RX ADMIN — BRIVARACETAM 100 MG: 10 SOLUTION ORAL at 20:37

## 2024-10-22 RX ADMIN — ALBUTEROL SULFATE 2.5 MG: 2.5 SOLUTION RESPIRATORY (INHALATION) at 07:47

## 2024-10-22 RX ADMIN — ACETYLCYSTEINE 2 ML: 200 SOLUTION ORAL; RESPIRATORY (INHALATION) at 15:37

## 2024-10-22 RX ADMIN — SODIUM CHLORIDE TAB 1 GM 1 G: 1 TAB at 20:37

## 2024-10-22 RX ADMIN — CARBAMAZEPINE 100 MG: 100 SUSPENSION ORAL at 17:15

## 2024-10-22 RX ADMIN — Medication 100 MCG: at 08:40

## 2024-10-22 RX ADMIN — METOCLOPRAMIDE HYDROCHLORIDE 10 MG: 5 SOLUTION ORAL at 17:17

## 2024-10-22 RX ADMIN — ACETYLCYSTEINE 2 ML: 200 SOLUTION ORAL; RESPIRATORY (INHALATION) at 19:28

## 2024-10-22 RX ADMIN — METOCLOPRAMIDE HYDROCHLORIDE 10 MG: 5 SOLUTION ORAL at 20:37

## 2024-10-22 RX ADMIN — ACETYLCYSTEINE 2 ML: 200 SOLUTION ORAL; RESPIRATORY (INHALATION) at 07:47

## 2024-10-22 RX ADMIN — POTASSIUM & SODIUM PHOSPHATES POWDER PACK 280-160-250 MG 2 PACKET: 280-160-250 PACK at 00:30

## 2024-10-22 RX ADMIN — MULTIVITAMIN TABLET 1 TABLET: TABLET at 08:40

## 2024-10-22 RX ADMIN — LEVOTHYROXINE SODIUM 100 MCG: 100 TABLET ORAL at 05:03

## 2024-10-22 RX ADMIN — CARBAMAZEPINE 150 MG: 100 SUSPENSION ORAL at 05:03

## 2024-10-22 RX ADMIN — GLYCOPYRROLATE 2 MG: 1 TABLET ORAL at 08:40

## 2024-10-22 ASSESSMENT — ACTIVITIES OF DAILY LIVING (ADL)
ADLS_ACUITY_SCORE: 59
ADLS_ACUITY_SCORE: 65
ADLS_ACUITY_SCORE: 61
ADLS_ACUITY_SCORE: 61
ADLS_ACUITY_SCORE: 59
ADLS_ACUITY_SCORE: 61
ADLS_ACUITY_SCORE: 59
ADLS_ACUITY_SCORE: 59
ADLS_ACUITY_SCORE: 65
ADLS_ACUITY_SCORE: 61
ADLS_ACUITY_SCORE: 61
ADLS_ACUITY_SCORE: 65
ADLS_ACUITY_SCORE: 65
ADLS_ACUITY_SCORE: 59
ADLS_ACUITY_SCORE: 61
ADLS_ACUITY_SCORE: 65
ADLS_ACUITY_SCORE: 59
ADLS_ACUITY_SCORE: 65
ADLS_ACUITY_SCORE: 61
ADLS_ACUITY_SCORE: 65
ADLS_ACUITY_SCORE: 65

## 2024-10-22 NOTE — PLAN OF CARE
Goal Outcome Evaluation:    Date: 10/21/2024 [id number]  Summary: Aspiration pneumonia of right lung. PMHx TBI with aphasia, R sided spastic hemiplegia and dysphagia with GJ-tube for TFs/meds; seizure disorder; panhypopituitarism; and frequent hospital admissions for recurrent pneumonia.    Cognitive Concerns/ Orientation: HECTOR orientation, calm; nonverbal;  responds with thumbs up and down to questions. Smiles and waves.  BEHAVIOR & AGGRESSION TOOL COLOR: Green  ABNL VS/O2: HoTN at 90/44. Otherwise VSS on RA.   MOBILITY: Ax2 lift, T&R Q 2 hrs.  PAIN MANAGMENT: No non verbal indicators of pain noted.  DIET: NPO, tube feed at goal rate 60mL/hr. 120mL flushes Q 4 hrs. Meds crushed or solutions through g-tube (Use silent Irene for sodium chloride tablet). Order to hold TF 1 hr before and after Synthroid.  BOWEL/BLADDER: Incontinent B/B. BM x 3 this shift. Male external catheter in place.  DRAIN/DEVICES: L hand PIV; G/J tube with cont. tube feed.  Lab/BG: Bg 148, Ca 8.2, and Phos 1.9.  SKIN: Scattered bruises and scabs, redness to sacrum.  D/C DAY/GOALS/PLACE: tentative 2-4 days.  OTHER IMPORTANT INFO: Contact precautions maintained.
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
## 2024-10-22 NOTE — PLAN OF CARE
10- AM shift  Cognitive Concerns/ Orientation: HECTOR orientation, calm; More sleepy this morning. Moans and makes some sounds; able to responds with thumbs up and down to questions. Smiles when feels good.   BEHAVIOR & AGGRESSION TOOL COLOR: Green  ABNL VS/O2: VSS on RA. Afebrile. LS diminished.   MOBILITY: Ax2 lift, T&R Q 2 hrs.   PAIN MANAGMENT: No non verbal indicators of pain noted. Gets restless in bed if wet or had BM.   DIET: NPO, tube feed at goal rate 60mL/hr. 120mL flushes Q 4 hrs.  Meds crushed or solutions through g-tube. Order to hold TF 1 hr before and after Synthroid dose.  BOWEL/BLADDER: Incontinent, good urine output. BM x 1-loose watery, miralax held this morning.    DRAIN/DEVICES:New PIV placed again to L hand, SL; interm IV abx. L AC PIV infiltrated this shift, arm swelling +2 to +1, boarders of swelling marked   SKIN: Scattered bruises and scabs, redness to sacrum-mepi in place. WOC RN consulted for coccyx.   D/C DAY/GOALS/PLACE: Pending, ID is following.   OTHER IMPORTANT INFO: Contact precautions maintained. Mom visited this shift.
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
## 2024-10-23 ENCOUNTER — APPOINTMENT (OUTPATIENT)
Dept: INTERVENTIONAL RADIOLOGY/VASCULAR | Facility: CLINIC | Age: 64
DRG: 871 | End: 2024-10-23
Attending: INTERNAL MEDICINE
Payer: MEDICARE

## 2024-10-23 LAB
GLUCOSE BLDC GLUCOMTR-MCNC: 99 MG/DL (ref 70–99)
PHOSPHATE SERPL-MCNC: 2.9 MG/DL (ref 2.5–4.5)

## 2024-10-23 PROCEDURE — 250N000009 HC RX 250: Performed by: INTERNAL MEDICINE

## 2024-10-23 PROCEDURE — 84100 ASSAY OF PHOSPHORUS: CPT | Performed by: HOSPITALIST

## 2024-10-23 PROCEDURE — 250N000013 HC RX MED GY IP 250 OP 250 PS 637: Performed by: HOSPITALIST

## 2024-10-23 PROCEDURE — 250N000009 HC RX 250: Performed by: RADIOLOGY

## 2024-10-23 PROCEDURE — C1769 GUIDE WIRE: HCPCS

## 2024-10-23 PROCEDURE — 250N000013 HC RX MED GY IP 250 OP 250 PS 637: Performed by: INTERNAL MEDICINE

## 2024-10-23 PROCEDURE — 999N000157 HC STATISTIC RCP TIME EA 10 MIN

## 2024-10-23 PROCEDURE — 0D2DXUZ CHANGE FEEDING DEVICE IN LOWER INTESTINAL TRACT, EXTERNAL APPROACH: ICD-10-PCS | Performed by: RADIOLOGY

## 2024-10-23 PROCEDURE — 99232 SBSQ HOSP IP/OBS MODERATE 35: CPT | Performed by: INTERNAL MEDICINE

## 2024-10-23 PROCEDURE — 94640 AIRWAY INHALATION TREATMENT: CPT

## 2024-10-23 PROCEDURE — 36415 COLL VENOUS BLD VENIPUNCTURE: CPT | Performed by: HOSPITALIST

## 2024-10-23 PROCEDURE — 250N000011 HC RX IP 250 OP 636: Performed by: HOSPITALIST

## 2024-10-23 PROCEDURE — 999N000040 HC STATISTIC CONSULT NO CHARGE VASC ACCESS

## 2024-10-23 PROCEDURE — 120N000001 HC R&B MED SURG/OB

## 2024-10-23 PROCEDURE — 49452 REPLACE G-J TUBE PERC: CPT

## 2024-10-23 PROCEDURE — 94640 AIRWAY INHALATION TREATMENT: CPT | Mod: 76

## 2024-10-23 RX ORDER — LIDOCAINE HYDROCHLORIDE 20 MG/ML
JELLY TOPICAL ONCE
Status: COMPLETED | OUTPATIENT
Start: 2024-10-23 | End: 2024-10-23

## 2024-10-23 RX ADMIN — MULTIVITAMIN TABLET 1 TABLET: TABLET at 09:26

## 2024-10-23 RX ADMIN — METOCLOPRAMIDE HYDROCHLORIDE 10 MG: 5 SOLUTION ORAL at 18:01

## 2024-10-23 RX ADMIN — AMPICILLIN SODIUM AND SULBACTAM SODIUM 3 G: 2; 1 INJECTION, POWDER, FOR SOLUTION INTRAMUSCULAR; INTRAVENOUS at 11:14

## 2024-10-23 RX ADMIN — HYDROCORTISONE 15 MG: 10 TABLET ORAL at 09:31

## 2024-10-23 RX ADMIN — CYANOCOBALAMIN TAB 1000 MCG 1000 MCG: 1000 TAB at 09:27

## 2024-10-23 RX ADMIN — ACETYLCYSTEINE 2 ML: 200 SOLUTION ORAL; RESPIRATORY (INHALATION) at 20:04

## 2024-10-23 RX ADMIN — LIDOCAINE HYDROCHLORIDE 6 ML: 20 JELLY TOPICAL at 14:44

## 2024-10-23 RX ADMIN — ALBUTEROL SULFATE 2.5 MG: 2.5 SOLUTION RESPIRATORY (INHALATION) at 20:04

## 2024-10-23 RX ADMIN — GLYCOPYRROLATE 2 MG: 1 TABLET ORAL at 21:23

## 2024-10-23 RX ADMIN — BRIVARACETAM 100 MG: 10 SOLUTION ORAL at 09:34

## 2024-10-23 RX ADMIN — OXYCODONE HYDROCHLORIDE AND ACETAMINOPHEN 3000 MG: 500 TABLET ORAL at 09:26

## 2024-10-23 RX ADMIN — POTASSIUM & SODIUM PHOSPHATES POWDER PACK 280-160-250 MG 1 PACKET: 280-160-250 PACK at 01:08

## 2024-10-23 RX ADMIN — ALBUTEROL SULFATE 2.5 MG: 2.5 SOLUTION RESPIRATORY (INHALATION) at 15:13

## 2024-10-23 RX ADMIN — LEVOTHYROXINE SODIUM 100 MCG: 100 TABLET ORAL at 06:48

## 2024-10-23 RX ADMIN — BRIVARACETAM 100 MG: 10 SOLUTION ORAL at 22:32

## 2024-10-23 RX ADMIN — SODIUM CHLORIDE TAB 1 GM 1 G: 1 TAB at 21:23

## 2024-10-23 RX ADMIN — ACETYLCYSTEINE 2 ML: 200 SOLUTION ORAL; RESPIRATORY (INHALATION) at 15:13

## 2024-10-23 RX ADMIN — AMPICILLIN SODIUM AND SULBACTAM SODIUM 3 G: 2; 1 INJECTION, POWDER, FOR SOLUTION INTRAMUSCULAR; INTRAVENOUS at 03:33

## 2024-10-23 RX ADMIN — POLYETHYLENE GLYCOL 3350 17 G: 17 POWDER, FOR SOLUTION ORAL at 09:28

## 2024-10-23 RX ADMIN — METOCLOPRAMIDE HYDROCHLORIDE 10 MG: 5 SOLUTION ORAL at 21:23

## 2024-10-23 RX ADMIN — SODIUM CHLORIDE TAB 1 GM 1 G: 1 TAB at 09:26

## 2024-10-23 RX ADMIN — ACETYLCYSTEINE 2 ML: 200 SOLUTION ORAL; RESPIRATORY (INHALATION) at 12:03

## 2024-10-23 RX ADMIN — Medication 40 MG: at 21:23

## 2024-10-23 RX ADMIN — AMPICILLIN SODIUM AND SULBACTAM SODIUM 3 G: 2; 1 INJECTION, POWDER, FOR SOLUTION INTRAMUSCULAR; INTRAVENOUS at 21:23

## 2024-10-23 RX ADMIN — GLYCOPYRROLATE 2 MG: 1 TABLET ORAL at 09:26

## 2024-10-23 RX ADMIN — CARBAMAZEPINE 150 MG: 100 SUSPENSION ORAL at 01:08

## 2024-10-23 RX ADMIN — Medication 1 PACKET: at 09:28

## 2024-10-23 RX ADMIN — AMPICILLIN SODIUM AND SULBACTAM SODIUM 3 G: 2; 1 INJECTION, POWDER, FOR SOLUTION INTRAMUSCULAR; INTRAVENOUS at 17:31

## 2024-10-23 RX ADMIN — CARBAMAZEPINE 150 MG: 100 SUSPENSION ORAL at 06:48

## 2024-10-23 RX ADMIN — ALBUTEROL SULFATE 2.5 MG: 2.5 SOLUTION RESPIRATORY (INHALATION) at 08:36

## 2024-10-23 RX ADMIN — CARBAMAZEPINE 150 MG: 100 SUSPENSION ORAL at 18:12

## 2024-10-23 RX ADMIN — Medication 100 MCG: at 09:26

## 2024-10-23 RX ADMIN — Medication 40 MG: at 09:27

## 2024-10-23 RX ADMIN — ALBUTEROL SULFATE 2.5 MG: 2.5 SOLUTION RESPIRATORY (INHALATION) at 11:59

## 2024-10-23 RX ADMIN — ACETYLCYSTEINE 2 ML: 200 SOLUTION ORAL; RESPIRATORY (INHALATION) at 08:38

## 2024-10-23 RX ADMIN — HYDROCORTISONE 7.5 MG: 5 TABLET ORAL at 18:00

## 2024-10-23 ASSESSMENT — ACTIVITIES OF DAILY LIVING (ADL)
ADLS_ACUITY_SCORE: 32.25
ADLS_ACUITY_SCORE: 0
ADLS_ACUITY_SCORE: 32.25
ADLS_ACUITY_SCORE: 32.25
ADLS_ACUITY_SCORE: 65
ADLS_ACUITY_SCORE: 0
ADLS_ACUITY_SCORE: 32.25
ADLS_ACUITY_SCORE: 0
ADLS_ACUITY_SCORE: 32.25
ADLS_ACUITY_SCORE: 0
ADLS_ACUITY_SCORE: 0
ADLS_ACUITY_SCORE: 32.25
ADLS_ACUITY_SCORE: 0
ADLS_ACUITY_SCORE: 32.25
ADLS_ACUITY_SCORE: 0
ADLS_ACUITY_SCORE: 0
ADLS_ACUITY_SCORE: 34.25

## 2024-10-23 NOTE — PLAN OF CARE
Goal Outcome Evaluation:      Plan of Care Reviewed With: patient    Overall Patient Progress: no change     Date: 10/22/24 -2300-0700  Summary: Here with recurrent aspiration pneumonia, recurrent admissions for same, on I.V abx. Paraplegic, total care, tube feedings  Cognitive Concerns/ Orientation: Alert UTD orientation, pt is non verbal  BEHAVIOR & AGGRESSION TOOL COLOR:Green   ABNL VS/O2:VSS on RA  MOBILITY: Bedrest, turn and repo Q 2,incontinent B&B  PAIN MANAGMENT: Unable to self report pain,no sign of pain noted  DIET: NPO, tube feed continous@60mls/hr with 120mls water flushes q 4hrs. Restart tube feeds Q 0746 following synthroid administration. New bag hanged prior to pause  Order to hold TF 1 hr before and after Synthroid.   BOWEL/BLADDER: incontinent B&B  DRAIN/DEVICES: PIV s/l, G/J tube insitu  SKIN: scattered scabs and bruises, sacral mepilex in place for redness  D/C DAY/GOALS/PLACE: TBD tentative 2-4 days.  OTHER IMPORTANT INFO: See sticky note for physician
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
## 2024-10-23 NOTE — PROGRESS NOTES
Writer attempted to flush G/J tube & administer morning meds thru J tube; unable to flush J tube. Dr. Mars notified. Per Dr. Mars; ok to give a.m. meds thru gastric tube & IR consult placed. IR scheduled for G/J tube change @ 1500.
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
## 2024-10-23 NOTE — IR NOTE
Interventional Radiology Intra-procedural Nursing Note    Patient Name: Keyon Farias  Medical Record Number: [id number]  Today's Date: October 23, 2024    Procedure consented for : G J tube change  Start time: 1445  End time: 1457  Report provided to: st Diaz RN  Patient depart time and location: 1500 to 607    Note: Patient entered Interventional Radiology Suite number 2 via cart. Patient awake, alert and oriented. Assisted onto procedural table in supine position. Prepped and draped.  Dr. Curiel in room. Time out and procedure started.     Administered medication totals:  Lidocaine 1% jelly  topical

## 2024-10-23 NOTE — CONSULTS
Patient is on IR schedule today Wednesday 10/23/24 for a GJ tube exchange.     Keyon Farias is a 64 year old male with history including Keyon Farias is a 64 year old male with history including TBI with aphasia, R sided spastic hemiplegia and dysphagia with GJ-tube for TFs/meds; seizure disorder; panhypopituitarism; and frequent hospital admissions for recurrent pneumonia with 8 prior hospitalizations in 2024 (most recently 9/15-9/18/2024); who presents with fevers and cough. He was found to have a R sided aspiration pneumonia.     During hospitalization the J portion of the GJ tube plugged and an exchange was requested.     -Labs WNL for procedure.    -Orders for NPO have been entered.  -Phone procedural education reviewed with Mother and Guardian Savannah in detail including, but not limited to risks, benefits and alternatives, questions answered with understanding verbalized by her and consent is in IR.     Temp:  [97.4  F (36.3  C)-98.3  F (36.8  C)] 97.4  F (36.3  C)  Pulse:  [72-93] 72  Resp:  [16-18] 18  BP: (106-127)/(63-65) 127/64  SpO2:  [93 %-97 %] 97 %    ROUTINE ICU LABS (Last four results)  CMP  Recent Labs   Lab 10/23/24  0628 10/22/24  1230 10/22/24  0202 10/21/24  2115 10/21/24  0638 10/20/24  0635 10/19/24  2114 10/19/24  0925   NA  --  138  --   --  140 143  --  145   POTASSIUM  --  4.4  --   --  3.9 3.8  --  3.8   CHLORIDE  --  100  --   --  104 108*  --  110*   CO2  --  29  --   --  28 24  --  26   ANIONGAP  --  9  --   --  8 11  --  9   GLC  --  168* 107* 148* 85 108*   < > 96   BUN  --  11.4  --   --  12.2 15.0  --  19.3   CR  --  0.84  0.84  --   --  0.99 0.98  0.97  --  1.10   GFRESTIMATED  --  >90  >90  --   --  86 87  88  --  76   STEVE  --  8.3*  --   --  8.2* 7.7*  --  7.9*   MAG  --   --   --   --  2.3  --   --   --    PHOS 2.9 2.2*  --   --  1.9*  --   --   --     < > = values in this interval not displayed.     CBC  Recent Labs   Lab 10/22/24  1230 10/20/24  1433 10/19/24  0911  10/18/24  1355   WBC 6.2 9.2 11.6* 14.2*   RBC 4.16* 4.21* 4.65 5.16   HGB 11.8* 12.2* 13.1* 14.8   HCT 37.0* 38.4* 42.5 46.6   MCV 89 91 91 90   MCH 28.4 29.0 28.2 28.7   MCHC 31.9 31.8 30.8* 31.8   RDW 15.2* 15.2* 15.4* 15.6*    153 171 246     INRNo lab results found in last 7 days.  Arterial Blood GasNo lab results found in last 7 days.    Please contact the IR department at [phone] for procedural related questions.     Total time: 20 minutes     Thanks, Osiris [facility] Interventional Radiology CNP ([phone]) (phone [phone])

## 2024-10-24 LAB
CREAT SERPL-MCNC: 0.74 MG/DL (ref 0.67–1.17)
EGFRCR SERPLBLD CKD-EPI 2021: >90 ML/MIN/1.73M2
PHOSPHATE SERPL-MCNC: 3 MG/DL (ref 2.5–4.5)

## 2024-10-24 PROCEDURE — 120N000001 HC R&B MED SURG/OB

## 2024-10-24 PROCEDURE — 250N000009 HC RX 250: Performed by: INTERNAL MEDICINE

## 2024-10-24 PROCEDURE — 82565 ASSAY OF CREATININE: CPT | Performed by: HOSPITALIST

## 2024-10-24 PROCEDURE — 94640 AIRWAY INHALATION TREATMENT: CPT

## 2024-10-24 PROCEDURE — 94640 AIRWAY INHALATION TREATMENT: CPT | Mod: 76

## 2024-10-24 PROCEDURE — 250N000013 HC RX MED GY IP 250 OP 250 PS 637: Performed by: INTERNAL MEDICINE

## 2024-10-24 PROCEDURE — 84100 ASSAY OF PHOSPHORUS: CPT | Performed by: INTERNAL MEDICINE

## 2024-10-24 PROCEDURE — 250N000011 HC RX IP 250 OP 636: Performed by: HOSPITALIST

## 2024-10-24 PROCEDURE — 999N000157 HC STATISTIC RCP TIME EA 10 MIN

## 2024-10-24 PROCEDURE — 36415 COLL VENOUS BLD VENIPUNCTURE: CPT | Performed by: HOSPITALIST

## 2024-10-24 PROCEDURE — 99232 SBSQ HOSP IP/OBS MODERATE 35: CPT | Performed by: INTERNAL MEDICINE

## 2024-10-24 RX ADMIN — METOCLOPRAMIDE HYDROCHLORIDE 10 MG: 5 SOLUTION ORAL at 18:37

## 2024-10-24 RX ADMIN — Medication 40 MG: at 10:59

## 2024-10-24 RX ADMIN — ACETYLCYSTEINE 2 ML: 200 SOLUTION ORAL; RESPIRATORY (INHALATION) at 19:10

## 2024-10-24 RX ADMIN — ACETYLCYSTEINE 2 ML: 200 SOLUTION ORAL; RESPIRATORY (INHALATION) at 07:13

## 2024-10-24 RX ADMIN — METOCLOPRAMIDE HYDROCHLORIDE 10 MG: 5 SOLUTION ORAL at 15:10

## 2024-10-24 RX ADMIN — BRIVARACETAM 100 MG: 10 SOLUTION ORAL at 14:38

## 2024-10-24 RX ADMIN — GLYCOPYRROLATE 2 MG: 1 TABLET ORAL at 10:07

## 2024-10-24 RX ADMIN — ALBUTEROL SULFATE 2.5 MG: 2.5 SOLUTION RESPIRATORY (INHALATION) at 17:21

## 2024-10-24 RX ADMIN — CARBAMAZEPINE 150 MG: 100 SUSPENSION ORAL at 06:23

## 2024-10-24 RX ADMIN — CYANOCOBALAMIN TAB 1000 MCG 1000 MCG: 1000 TAB at 10:08

## 2024-10-24 RX ADMIN — METOCLOPRAMIDE HYDROCHLORIDE 10 MG: 5 SOLUTION ORAL at 22:14

## 2024-10-24 RX ADMIN — METOCLOPRAMIDE HYDROCHLORIDE 10 MG: 5 SOLUTION ORAL at 10:56

## 2024-10-24 RX ADMIN — Medication 100 MCG: at 10:08

## 2024-10-24 RX ADMIN — CARBAMAZEPINE 150 MG: 100 SUSPENSION ORAL at 01:44

## 2024-10-24 RX ADMIN — SODIUM CHLORIDE TAB 1 GM 1 G: 1 TAB at 10:55

## 2024-10-24 RX ADMIN — AMPICILLIN SODIUM AND SULBACTAM SODIUM 3 G: 2; 1 INJECTION, POWDER, FOR SOLUTION INTRAMUSCULAR; INTRAVENOUS at 10:02

## 2024-10-24 RX ADMIN — AMPICILLIN SODIUM AND SULBACTAM SODIUM 3 G: 2; 1 INJECTION, POWDER, FOR SOLUTION INTRAMUSCULAR; INTRAVENOUS at 01:45

## 2024-10-24 RX ADMIN — CARBAMAZEPINE 150 MG: 100 SUSPENSION ORAL at 15:11

## 2024-10-24 RX ADMIN — MULTIVITAMIN TABLET 1 TABLET: TABLET at 10:08

## 2024-10-24 RX ADMIN — SODIUM CHLORIDE TAB 1 GM 1 G: 1 TAB at 22:15

## 2024-10-24 RX ADMIN — ALBUTEROL SULFATE 2.5 MG: 2.5 SOLUTION RESPIRATORY (INHALATION) at 07:13

## 2024-10-24 RX ADMIN — ALBUTEROL SULFATE 2.5 MG: 2.5 SOLUTION RESPIRATORY (INHALATION) at 19:11

## 2024-10-24 RX ADMIN — CARBAMAZEPINE 100 MG: 100 SUSPENSION ORAL at 18:37

## 2024-10-24 RX ADMIN — AMPICILLIN SODIUM AND SULBACTAM SODIUM 3 G: 2; 1 INJECTION, POWDER, FOR SOLUTION INTRAMUSCULAR; INTRAVENOUS at 16:50

## 2024-10-24 RX ADMIN — Medication 1 PACKET: at 10:08

## 2024-10-24 RX ADMIN — HYDROCORTISONE: 1 CREAM TOPICAL at 22:39

## 2024-10-24 RX ADMIN — HYDROCORTISONE 15 MG: 10 TABLET ORAL at 10:57

## 2024-10-24 RX ADMIN — HYDROCORTISONE 7.5 MG: 5 TABLET ORAL at 15:10

## 2024-10-24 RX ADMIN — OXYCODONE HYDROCHLORIDE AND ACETAMINOPHEN 3000 MG: 500 TABLET ORAL at 10:08

## 2024-10-24 RX ADMIN — ACETYLCYSTEINE 2 ML: 200 SOLUTION ORAL; RESPIRATORY (INHALATION) at 11:11

## 2024-10-24 RX ADMIN — GLYCOPYRROLATE 2 MG: 1 TABLET ORAL at 22:13

## 2024-10-24 RX ADMIN — LEVOTHYROXINE SODIUM 100 MCG: 100 TABLET ORAL at 06:23

## 2024-10-24 RX ADMIN — CARBAMAZEPINE 150 MG: 100 SUSPENSION ORAL at 22:39

## 2024-10-24 RX ADMIN — AMPICILLIN SODIUM AND SULBACTAM SODIUM 3 G: 2; 1 INJECTION, POWDER, FOR SOLUTION INTRAMUSCULAR; INTRAVENOUS at 22:13

## 2024-10-24 RX ADMIN — BRIVARACETAM 100 MG: 10 SOLUTION ORAL at 22:13

## 2024-10-24 RX ADMIN — ALBUTEROL SULFATE 2.5 MG: 2.5 SOLUTION RESPIRATORY (INHALATION) at 11:11

## 2024-10-24 RX ADMIN — ACETYLCYSTEINE 2 ML: 200 SOLUTION ORAL; RESPIRATORY (INHALATION) at 17:17

## 2024-10-24 RX ADMIN — Medication 40 MG: at 22:15

## 2024-10-24 ASSESSMENT — ACTIVITIES OF DAILY LIVING (ADL)
ADLS_ACUITY_SCORE: 0

## 2024-10-24 NOTE — PLAN OF CARE
Goal Outcome Evaluation:    Summary: Aspiration pneumonia  DATE/TIME: 10/24/2024, 2280-4629  Cognitive Concerns/ Orientation: Alert, nonverbal; cooperative.   BEHAVIOR & AGGRESSION TOOL COLOR: Green  ABNL VS/O2: VSS, room air  MOBILITY: Assist-2 lift; Turn/repo Q2hrs. Spastic right hemiplegia  PAIN MANAGMENT: No non-verbal indicators of pain present.   DIET: NPO, tube feed at goal rate 60mL/hr with 120mL flushes Q 4 hrs-new bag started;  Meds crushed or solutions through g-tube  BOWEL/BLADDER: Incontinent-external catheter in place/Good UOP, 1 large loose BM-miralax held this am.   DRAIN/DEVICES: L PIV saline locked, IV Zosyn Q6hrs;  G/J tube with continuous tube feed  SKIN: Scattered bruises and scabs, redness to sacrum (mepi in place)  D/C DAY/GOALS/PLACE: Possibly 10/25 back home if remaining stable. Ride arranged between [id number].    OTHER IMPORTANT INFO: Congested cough, no signs of respiratory distress, may need deep suction if unable to cough up secretions. Contact precautions maintained for MRSA/VRE. Mother at bedside today and updated on plan of care.

## 2024-10-24 NOTE — PROGRESS NOTES
[id number]  Cognitive Concerns/ Orientation: alert but HECTOR orientation, calm; nonverbal baseline  BEHAVIOR & AGGRESSION TOOL COLOR: Green  ABNL VS/O2: VSS on RA.   MOBILITY: Ax2 lift, T&R  PAIN MANAGMENT: No non verbal indicators of pain noted.  DIET: NPO, tube feed at goal rate 60mL/hr. 120mL flushes Q 4 hrs. Meds crushed or solutions through g-tube  BOWEL/BLADDER: Incontinent B/B.No BM this shift.  DRAIN/DEVICES: L PIV SL/intermittent antibiotics. G/J tube with cont. tube feed.  SKIN: Scattered bruises and scabs, redness to sacrum (mepi in place).  D/C DAY/GOALS/PLACE: pending.  OTHER IMPORTANT INFO: Contact precautions maintained. Infrequent cough. IR replaced new G/J tube today
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
## 2024-10-24 NOTE — PLAN OF CARE
Goal Outcome Evaluation:  10/23/2024 [id number]  Alert. Non-verbal at baseline. VSS on Rm Air. LS course crackles. Infrequent wet cough. G/J tube w/ tube feedings; met resistance w/ J tube, unable to flush. Gastric tube patent. IR placed new G/J tube today. Continues w/ TF @ goal rate; 60 ml/hr. Asstx2 turn & repo. Scattered scratches to R/L hip, BLE. Mepilex to coccyx. Incontinent. External catheter. Passing flatus. No BM. Intermittent abx to PIV L hand. Discharge pending.

## 2024-10-24 NOTE — PLAN OF CARE
Summary: Aspiration pneumonia    DATE/TIME: 10/23/2024 2794-9125  Cognitive Concerns/ Orientation: Alert, nonverbal; cooperative  BEHAVIOR & AGGRESSION TOOL COLOR: Green  ABNL VS/O2: VSS, room air  MOBILITY: Assist-2 lift; Turn/repo Q2hrs  PAIN MANAGMENT: No non-verbal indicators   DIET: NPO, tube feed at goal rate 60mL/hr with 120mL flushes Q 4 hrs;  Meds crushed or solutions through g-tube  BOWEL/BLADDER: Incontinent-external catheter in place, 1 smear BM  DRAIN/DEVICES: L PIV saline locked, IV Zosyn Q6hrs;  G/J tube with continuous tube feed  SKIN: Scattered bruises and scabs, redness to sacrum (mepi in place)  D/C DAY/GOALS/PLACE: Possibly 10/24 (was awaiting GJ tube replacement-completed 10/23 with IR)  OTHER IMPORTANT INFO: Contact precautions maintained for MRSA/VRE

## 2024-10-24 NOTE — PLAN OF CARE
Goal Outcome Evaluation:    Summary: Aspiration pneumonia  DATE/TIME: 10/24/2024, 2059-2603  Cognitive Concerns/ Orientation: Alert, nonverbal; cooperative.   BEHAVIOR & AGGRESSION TOOL COLOR: Green  ABNL VS/O2: VSS, room air  MOBILITY: Assist-2 lift; Turn/repo Q2hrs. Spastic right hemiplegia  PAIN MANAGMENT: No non-verbal indicators of pain present.   DIET: NPO, tube feed at goal rate 60mL/hr with 120mL flushes Q 4 hrs-new bag started;  Meds crushed or solutions through g-tube  BOWEL/BLADDER: Incontinent-external catheter in place/Good UOP, Day shift reported 1 large loose BM-miralax held this am.   DRAIN/DEVICES: L PIV saline locked, IV Zosyn Q6hrs;  G/J tube with continuous tube feed  SKIN: Scattered bruises and scabs, redness to sacrum (mepi in place)  D/C DAY/GOALS/PLACE: Possibly 10/25 back home if remaining stable. Ride arranged between [id number].    OTHER IMPORTANT INFO: Congested cough, no signs of respiratory distress. Contact precautions maintained for MRSA/VRE.     Mother at bedside.

## 2024-10-24 NOTE — PROGRESS NOTES
Care Management Follow Up    Length of Stay (days): 6    Expected Discharge Date: 10/25/2024     Concerns to be Addressed: discharge planning     Patient plan of care discussed at interdisciplinary rounds: Yes    Anticipated Discharge Disposition: Home              Anticipated Discharge Services: PCA, HHC  Anticipated Discharge DME: None    Patient/family educated on Medicare website which has current facility and service quality ratings: no  Education Provided on the Discharge Plan: Yes  Patient/Family in Agreement with the Plan: yes    Referrals Placed by CM/SW: Homecare  Private pay costs discussed: Not applicable    Discussed  Partnership in Safe Discharge Planning  document with patient/family: No     Handoff Completed: Yes, [facility] PCP: Internal handoff referral completed    Additional Information:  Following for discharge planning. Per MD Pt may be ready to go tomorrow.  Called and talked to Pt's mom/guardian Savannah.  She is okay with patient coming home tomorrow.   Needs stretcher transport.  Per Savannah Pt is not open to [facility], [facility] as previously indicated.   She would like to talk to dietician regarding TF and options for decrease in secretions.    Dietician paged, awaiting call back.   Mom notes she is using [employer] and sometimes adds boost.     CC called [facility] [employer].  Per intake Pt is open to RN/PT/HHA/SLP.  This would be helpful for patient at discharge.  MD at discharge should write order to resume [facility].  Entered in [facility], tab    [facility] St[facility] (due to TBI, hemiplegia) set with David for 12:.  PCS done via epic/sent    Follow-up made and added to AVS.  Mom prefers virtual  Oct 29, 2024 12:00 PM  (Arrive by 11:55 AM)  ED/Hospital Follow Up with [facility] ([facility] ) [address], [address]  [facility] [address] [phone]         Next Steps: Follow for medically readiness    Alee Sousa RN

## 2024-10-25 VITALS
SYSTOLIC BLOOD PRESSURE: 114 MMHG | HEIGHT: 70 IN | TEMPERATURE: 97.5 F | HEART RATE: 77 BPM | RESPIRATION RATE: 18 BRPM | BODY MASS INDEX: 21.49 KG/M2 | WEIGHT: 150.13 LBS | DIASTOLIC BLOOD PRESSURE: 64 MMHG | OXYGEN SATURATION: 95 %

## 2024-10-25 PROCEDURE — 94640 AIRWAY INHALATION TREATMENT: CPT

## 2024-10-25 PROCEDURE — 250N000009 HC RX 250: Performed by: INTERNAL MEDICINE

## 2024-10-25 PROCEDURE — 250N000011 HC RX IP 250 OP 636: Performed by: HOSPITALIST

## 2024-10-25 PROCEDURE — 999N000157 HC STATISTIC RCP TIME EA 10 MIN

## 2024-10-25 PROCEDURE — 250N000013 HC RX MED GY IP 250 OP 250 PS 637: Performed by: INTERNAL MEDICINE

## 2024-10-25 PROCEDURE — 99239 HOSP IP/OBS DSCHRG MGMT >30: CPT

## 2024-10-25 RX ADMIN — BRIVARACETAM 100 MG: 10 SOLUTION ORAL at 10:26

## 2024-10-25 RX ADMIN — Medication 40 MG: at 08:24

## 2024-10-25 RX ADMIN — METOCLOPRAMIDE HYDROCHLORIDE 10 MG: 5 SOLUTION ORAL at 08:23

## 2024-10-25 RX ADMIN — SODIUM CHLORIDE TAB 1 GM 1 G: 1 TAB at 08:23

## 2024-10-25 RX ADMIN — Medication 100 MCG: at 08:23

## 2024-10-25 RX ADMIN — HYDROCORTISONE 15 MG: 10 TABLET ORAL at 08:24

## 2024-10-25 RX ADMIN — AMPICILLIN SODIUM AND SULBACTAM SODIUM 3 G: 2; 1 INJECTION, POWDER, FOR SOLUTION INTRAMUSCULAR; INTRAVENOUS at 03:59

## 2024-10-25 RX ADMIN — ALBUTEROL SULFATE 2.5 MG: 2.5 SOLUTION RESPIRATORY (INHALATION) at 11:06

## 2024-10-25 RX ADMIN — ACETYLCYSTEINE 2 ML: 200 SOLUTION ORAL; RESPIRATORY (INHALATION) at 07:27

## 2024-10-25 RX ADMIN — LEVOTHYROXINE SODIUM 100 MCG: 100 TABLET ORAL at 05:43

## 2024-10-25 RX ADMIN — OXYCODONE HYDROCHLORIDE AND ACETAMINOPHEN 3000 MG: 500 TABLET ORAL at 08:23

## 2024-10-25 RX ADMIN — CYANOCOBALAMIN TAB 1000 MCG 1000 MCG: 1000 TAB at 08:23

## 2024-10-25 RX ADMIN — GLYCOPYRROLATE 2 MG: 1 TABLET ORAL at 08:23

## 2024-10-25 RX ADMIN — MULTIVITAMIN TABLET 1 TABLET: TABLET at 08:23

## 2024-10-25 RX ADMIN — AMPICILLIN SODIUM AND SULBACTAM SODIUM 3 G: 2; 1 INJECTION, POWDER, FOR SOLUTION INTRAMUSCULAR; INTRAVENOUS at 10:13

## 2024-10-25 RX ADMIN — CARBAMAZEPINE 150 MG: 100 SUSPENSION ORAL at 05:46

## 2024-10-25 RX ADMIN — ALBUTEROL SULFATE 2.5 MG: 2.5 SOLUTION RESPIRATORY (INHALATION) at 07:27

## 2024-10-25 RX ADMIN — Medication 1 PACKET: at 08:23

## 2024-10-25 RX ADMIN — ACETYLCYSTEINE 2 ML: 200 SOLUTION ORAL; RESPIRATORY (INHALATION) at 11:06

## 2024-10-25 ASSESSMENT — ACTIVITIES OF DAILY LIVING (ADL)
ADLS_ACUITY_SCORE: 0

## 2024-10-25 NOTE — PLAN OF CARE
Goal Outcome Evaluation:      Plan of Care Reviewed With: patient      Summary: Aspiration pneumonia  DATE/TIME: 10/24/2024, [id number]  Cognitive Concerns/ Orientation: Alert, nonverbal; cooperative.   BEHAVIOR & AGGRESSION TOOL COLOR: Green  ABNL VS/O2: VSS, room air  MOBILITY: Assist-2 lift; Turn/repo Q2hrs. Spastic right hemiplegia  PAIN MANAGMENT: No non-verbal indicators of pain present.   DIET: NPO, tube feed at goal rate 60mL/hr with 120mL flushes Q 4 hrs-new bag started;  Meds crushed or solutions through g-tube  BOWEL/BLADDER: Incontinent-external catheter in place/Good UOP, 1 large loose BM this shfit  DRAIN/DEVICES: L PIV saline locked, IV Zosyn Q6hrs;  G/J tube with continuous tube feed  SKIN: Scattered bruises and scabs, redness to sacrum (mepi in place)  D/C DAY/GOALS/PLACE: Possibly 10/25 if remaining stable.   OTHER IMPORTANT INFO: Congested cough, no signs of respiratory distress, may need deep suction if unable to cough up secretions. Contact precautions maintained for MRSA/VRE.

## 2024-10-25 NOTE — PROGRESS NOTES
"Received request to call pt's mother, Savannah, to discuss TF for home    Spoke with Savannah this am  She is very concerned about continued aspiration admissions  She has tried various TF formulas and find that they all cause \"phlegm/secretions\" ([employer]ev[employer], [employer])  She has recently tried Boost High Protein and feels pt might tolerate this a bit better  She receives EN from [facility]  Savannah is requesting MD order for the Boost High Protein (\"very expensive to buy at the store\")  Mother is also requesting a TF volume for the Boost High Protein to give at home  She has also heard about Orgain (plant based oral supplement) - she would like to know how many cartons of this pt would need, if she wanted to try this at home  Mother plans to trial some various formulas to see if secretions decrease    Boost High Protein:   1 bottle (237 mL) = 250 cals, 20 gm protein, 0 gm fiber  Pt would need 6 bottles/day = rate of 60 mL/hr   This provides 1500 cals, 120 gm pro    Boost PLUS:  1 bottle (237 mL) = 360 cals, 14 gm pro  Pt would need 5 bottles/day = rate of 50 mL/hr  This provides 1800 cals, 70 gm pro      Orgain:  1 bottle (330 mL) = 230 cals, 16 gm pro, 2 gm fiber  Pt would need 7 bottles/day = rate of 95 mL/hr  This provides 1610 cals, 112 gm pro, 14 gm fiber    If mother plans to use Boost supplement, would recommend Boost PLUS    Above discussed with unit Care Coordinator    **  Pt should be followed by outpatient RD for continued assessment of TF tolerance/adequacy    Aurora Yousif RD, LD  Clinical Dietitian - [facility]   Pager - [phone]      "

## 2024-10-25 NOTE — PLAN OF CARE
Goal Outcome Evaluation:  [id number]       Goal Outcome Evaluation:     Summary: Aspiration pneumonia  DATE/TIME: 10/24/2024  Cognitive Concerns/ Orientation: Alert, nonverbal; cooperative.   BEHAVIOR & AGGRESSION TOOL COLOR: Green  ABNL VS/O2: VSS, room air  MOBILITY: Assist-2 lift; Turn/repo Q2hrs. Spastic right hemiplegia  PAIN MANAGMENT: No non-verbal indicators of pain present.   DIET: NPO, tube feed at goal rate 60mL/hr with 120mL flushes Q 4 hrs-new bag started;  Meds crushed or solutions through g-tube  BOWEL/BLADDER: Incontinent-external catheter in place/Good UOP. No BM  DRAIN/DEVICES: L PIV saline locked, IV Zosyn Q6hrs;  G/J tube with continuous tube feed  SKIN: Scattered bruises and scabs, redness to sacrum (mepi in place)  D/C DAY/GOALS/PLACE: Possibly 10/25 back home if remaining stable. Ride arranged between [id number].    OTHER IMPORTANT INFO: Congested cough, no signs of respiratory distress. Contact precautions maintained for MRSA/VRE.
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
## 2024-10-25 NOTE — PLAN OF CARE
Goal Outcome Evaluation:      Plan of Care Reviewed With: patient    Overall Patient Progress: improvingOverall Patient Progress: improving         Summary: Aspiration pneumonia    DATE/TIME: 10/25/2024  Day  Cognitive Concerns/ Orientation: Alert, nonverbal, coop.   BEHAVIOR & AGGRESSION TOOL COLOR: Green  ABNL VS/O2: VSS on RA  MOBILITY: Assist-2 lift; Turn/repo Q2hrs. Spastic right hemiplegia.  PAIN MANAGMENT: No non-verbal indicators of pain present..   DIET: NPO, tube feed at goal rate 60mL/hr with 120mL flushes Q 4 hrs-new bag hung;  Meds crushed or solutions through g-tube. Stopped at Noon prior to discharge.   BOWEL/BLADDER: Incontinent urine, no BM.  DRAIN/DEVICES: L PIV saline locked, IV unasyn Q6hrs;  G/J tube with continuous tube feed.  SKIN: Scattered bruises and scabs, redness to sacrum (mepi in place)  D/C DAY/GOALS/PLACE: Stretcher ride home today.   OTHER IMPORTANT INFO: Congested cough, no signs of respiratory distress, did not need deep suction. Contact precautions maintained for MRSA/VRE.    Discharge    Patient discharged to home via stretcher, Mom waiting at home.       Listed belongings gathered and given to patient (including from security/pharmacy). Yes  Care Plan and Patient education resolved: Yes  Prescriptions if needed, hard copies sent with patient  Yes  Medication Bin checked and emptied on discharge Yes  SW/care coordinator/charge RN aware of discharge: Yes

## 2024-10-25 NOTE — DISCHARGE INSTRUCTIONS
Boost High Protein:   1 bottle (237 mL) = 250 cals, 20 gm protein, 0 gm fiber  Pt would need 6 bottles/day = rate of 60 mL/hr   This provides 1500 cals, 120 gm pro     Boost PLUS:  1 bottle (237 mL) = 360 cals, 14 gm pro  Pt would need 5 bottles/day = rate of 50 mL/hr  This provides 1800 cals, 70 gm pro        Orgain:  1 bottle (330 mL) = 230 cals, 16 gm pro, 2 gm fiber  Pt would need 7 bottles/day = rate of 95 mL/hr  This provides 1610 cals, 112 gm pro, 14 gm fiber     If mother plans to use Boost supplement, would recommend Boost PLUS

## 2024-10-25 NOTE — PROGRESS NOTES
Care Management Discharge Note    Discharge Date: 10/25/2024       Discharge Disposition: Home    Discharge Services: PCA    Discharge DME: None    Discharge Transportation: agency, health plan transportation    Private pay costs discussed: Not applicable    Does the patient's insurance plan have a 3 day qualifying hospital stay waiver?  No    PAS Confirmation Code:    Patient/family educated on Medicare website which has current facility and service quality ratings: no    Education Provided on the Discharge Plan: Yes  Persons Notified of Discharge Plans: Mother (Guardian) MD, Bedside RN  Patient/Family in Agreement with the Plan: yes    Handoff Referral Completed: Yes, [facility] PCP: Internal handoff referral completed    Additional Information:  Pt discharge to home with resumption of HHC (RN/PT/SLP/HHA) and PCA services with care also provided by mom/guardian Savannah.     Called and talked with mom Savannah and reviewed plan for home w/ resumption of HHC through [facility], [email or web address].  Reviewed Follow-up appointment. Mom noted dietician had called her and reviewed options for boost.  She would like to try this in hopes it helps a little bit.  Dietician instructions added to AVS. MD paged to add OP dietician (if able) and order boost so Pt might be able to obtain from [facility] [employer].  Encouraged Savannah to talk with C and PCP about TF.  Voiced understanding of above.  Savannah noted she is just trying to take care of her son the best she can.  Supportive listening used.     Updated [facility] intake of discharge.  Orders sent via DOD    Handoff sent to PCP with further dietary information.   PCP follow up  Oct 29, 2024 12:00 PM  (Arrive by 11:55 AM)  ED/Hospital Follow Up with [facility] ([facility] ) [address], [address]  [place] [address] [phone]     [facility] Transport stretcher set up for 12:.   HUC aware.     Bedside RN to  review AVS.     Addendum 1411:  Received script from MD for Boost High Protein.   Talked with Elizabet at [facility].  They do carry that.  Script and supporting notes sent via fax to [phone].  Left message with Savannah that script had been sent.  Encouraged her to follow up with PCP if ongoing issues    Alee Sousa RN

## 2024-10-27 ENCOUNTER — PATIENT OUTREACH (OUTPATIENT)
Dept: CARE COORDINATION | Facility: CLINIC | Age: 64
End: 2024-10-27
Payer: MEDICARE

## 2024-10-27 ENCOUNTER — MEDICAL CORRESPONDENCE (OUTPATIENT)
Dept: HEALTH INFORMATION MANAGEMENT | Facility: CLINIC | Age: 64
End: 2024-10-27

## 2024-10-27 NOTE — PROGRESS NOTES
Connected Care Resource Center:   [facility] Resource Center Contact  [facility]/Voicemail     Clinical Data: Post-Discharge Outreach     Outreach attempted x 2.  Left message on patient's voicemail, providing [facility]'s central phone number of [phone] ([phone]) for questions/concerns and/or to schedule an appt with an [facility] provider, if they do not have a PCP.      Plan:  [facility] will do no further outreaches at this time.       Faby Davison MA  Connected Care Resource Center, [facility]    *Connected Care Resource Team does NOT follow patient ongoing. Referrals are identified based on internal discharge reports and the outreach is to ensure patient has an understanding of their discharge instructions.

## 2024-10-29 ENCOUNTER — VIRTUAL VISIT (OUTPATIENT)
Dept: FAMILY MEDICINE | Facility: CLINIC | Age: 64
End: 2024-10-29
Payer: MEDICARE

## 2024-10-29 ENCOUNTER — TELEPHONE (OUTPATIENT)
Dept: FAMILY MEDICINE | Facility: CLINIC | Age: 64
End: 2024-10-29

## 2024-10-29 DIAGNOSIS — E27.49 SECONDARY ADRENAL INSUFFICIENCY (H): ICD-10-CM

## 2024-10-29 DIAGNOSIS — J69.0 ASPIRATION PNEUMONIA OF RIGHT LUNG, UNSPECIFIED ASPIRATION PNEUMONIA TYPE, UNSPECIFIED PART OF LUNG (H): ICD-10-CM

## 2024-10-29 DIAGNOSIS — R13.10 DYSPHAGIA, UNSPECIFIED TYPE: ICD-10-CM

## 2024-10-29 DIAGNOSIS — Z09 HOSPITAL DISCHARGE FOLLOW-UP: Primary | ICD-10-CM

## 2024-10-29 PROCEDURE — 99213 OFFICE O/P EST LOW 20 MIN: CPT | Mod: 95 | Performed by: PHYSICIAN ASSISTANT

## 2024-10-29 NOTE — TELEPHONE ENCOUNTER
MTM referral from: Transitions of Care (recent hospital discharge, TCU discharge, or ED visit)    MTM referral outreach attempt #2 on October 29, 2024 at 10:34 AM      Outcome: Patient is not interested at this time because feels they just had an mtm appointment not to long ago    Use vbc  for the carrier/Plan on the flowsheet    Dalia Gregory CMA  MTM
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
## 2024-10-31 ENCOUNTER — HOSPITAL ENCOUNTER (INPATIENT)
Facility: CLINIC | Age: 64
LOS: 3 days | Discharge: HOME | DRG: 178 | End: 2024-11-03
Attending: EMERGENCY MEDICINE | Admitting: HOSPITALIST
Payer: MEDICARE

## 2024-10-31 ENCOUNTER — APPOINTMENT (OUTPATIENT)
Dept: GENERAL RADIOLOGY | Facility: CLINIC | Age: 64
DRG: 178 | End: 2024-10-31
Attending: EMERGENCY MEDICINE
Payer: MEDICARE

## 2024-10-31 DIAGNOSIS — J69.0 ASPIRATION PNEUMONIA OF RIGHT LUNG, UNSPECIFIED ASPIRATION PNEUMONIA TYPE, UNSPECIFIED PART OF LUNG (H): ICD-10-CM

## 2024-10-31 DIAGNOSIS — J69.0 ASPIRATION PNEUMONIA OF BOTH LUNGS, UNSPECIFIED ASPIRATION PNEUMONIA TYPE, UNSPECIFIED PART OF LUNG (H): Primary | ICD-10-CM

## 2024-10-31 DIAGNOSIS — S06.9XAA UNSPECIFIED INTRACRANIAL INJURY WITH LOSS OF CONSCIOUSNESS STATUS UNKNOWN, INITIAL ENCOUNTER (H): ICD-10-CM

## 2024-10-31 DIAGNOSIS — J69.0 ASPIRATION PNEUMONIA, UNSPECIFIED ASPIRATION PNEUMONIA TYPE, UNSPECIFIED LATERALITY, UNSPECIFIED PART OF LUNG (H): ICD-10-CM

## 2024-10-31 LAB
ALBUMIN UR-MCNC: NEGATIVE MG/DL
ANION GAP SERPL CALCULATED.3IONS-SCNC: 8 MMOL/L (ref 7–15)
APPEARANCE UR: CLEAR
BASE EXCESS BLDV CALC-SCNC: 1 MMOL/L (ref -3–3)
BASOPHILS # BLD AUTO: 0.1 10E3/UL (ref 0–0.2)
BASOPHILS NFR BLD AUTO: 1 %
BILIRUB UR QL STRIP: NEGATIVE
BUN SERPL-MCNC: 29.7 MG/DL (ref 8–23)
CALCIUM SERPL-MCNC: 8.7 MG/DL (ref 8.8–10.4)
CHLORIDE SERPL-SCNC: 100 MMOL/L (ref 98–107)
COLOR UR AUTO: YELLOW
CREAT SERPL-MCNC: 0.94 MG/DL (ref 0.67–1.17)
EGFRCR SERPLBLD CKD-EPI 2021: >90 ML/MIN/1.73M2
EOSINOPHIL # BLD AUTO: 0.5 10E3/UL (ref 0–0.7)
EOSINOPHIL NFR BLD AUTO: 4 %
ERYTHROCYTE [DISTWIDTH] IN BLOOD BY AUTOMATED COUNT: 14.6 % (ref 10–15)
FLUAV RNA SPEC QL NAA+PROBE: NEGATIVE
FLUBV RNA RESP QL NAA+PROBE: NEGATIVE
GLUCOSE SERPL-MCNC: 128 MG/DL (ref 70–99)
GLUCOSE UR STRIP-MCNC: NEGATIVE MG/DL
HCO3 BLDV-SCNC: 27 MMOL/L (ref 21–28)
HCO3 SERPL-SCNC: 28 MMOL/L (ref 22–29)
HCT VFR BLD AUTO: 41.5 % (ref 40–53)
HGB BLD-MCNC: 13.7 G/DL (ref 13.3–17.7)
HGB UR QL STRIP: NEGATIVE
IMM GRANULOCYTES # BLD: 0 10E3/UL
IMM GRANULOCYTES NFR BLD: 0 %
KETONES UR STRIP-MCNC: NEGATIVE MG/DL
LACTATE BLD-SCNC: 1.7 MMOL/L
LEUKOCYTE ESTERASE UR QL STRIP: NEGATIVE
LYMPHOCYTES # BLD AUTO: 2.2 10E3/UL (ref 0.8–5.3)
LYMPHOCYTES NFR BLD AUTO: 20 %
MCH RBC QN AUTO: 29 PG (ref 26.5–33)
MCHC RBC AUTO-ENTMCNC: 33 G/DL (ref 31.5–36.5)
MCV RBC AUTO: 88 FL (ref 78–100)
MONOCYTES # BLD AUTO: 0.6 10E3/UL (ref 0–1.3)
MONOCYTES NFR BLD AUTO: 6 %
NEUTROPHILS # BLD AUTO: 7.2 10E3/UL (ref 1.6–8.3)
NEUTROPHILS NFR BLD AUTO: 68 %
NITRATE UR QL: NEGATIVE
NRBC # BLD AUTO: 0 10E3/UL
NRBC BLD AUTO-RTO: 0 /100
PCO2 BLDV: 45 MM HG (ref 40–50)
PH BLDV: 7.38 [PH] (ref 7.32–7.43)
PH UR STRIP: 7 [PH] (ref 5–7)
PLATELET # BLD AUTO: 273 10E3/UL (ref 150–450)
PO2 BLDV: 70 MM HG (ref 25–47)
POTASSIUM SERPL-SCNC: 4.2 MMOL/L (ref 3.4–5.3)
RBC # BLD AUTO: 4.72 10E6/UL (ref 4.4–5.9)
RBC URINE: 1 /HPF
RSV RNA SPEC NAA+PROBE: NEGATIVE
SAO2 % BLDV: 93 % (ref 70–75)
SARS-COV-2 RNA RESP QL NAA+PROBE: NEGATIVE
SODIUM SERPL-SCNC: 136 MMOL/L (ref 135–145)
SP GR UR STRIP: 1.03 (ref 1–1.03)
SQUAMOUS EPITHELIAL: <1 /HPF
UROBILINOGEN UR STRIP-MCNC: NORMAL MG/DL
WBC # BLD AUTO: 10.7 10E3/UL (ref 4–11)
WBC URINE: <1 /HPF

## 2024-10-31 PROCEDURE — 85025 COMPLETE CBC W/AUTO DIFF WBC: CPT | Performed by: EMERGENCY MEDICINE

## 2024-10-31 PROCEDURE — 96365 THER/PROPH/DIAG IV INF INIT: CPT

## 2024-10-31 PROCEDURE — 99223 1ST HOSP IP/OBS HIGH 75: CPT | Performed by: NURSE PRACTITIONER

## 2024-10-31 PROCEDURE — 999N000157 HC STATISTIC RCP TIME EA 10 MIN

## 2024-10-31 PROCEDURE — 250N000011 HC RX IP 250 OP 636: Performed by: NURSE PRACTITIONER

## 2024-10-31 PROCEDURE — 81001 URINALYSIS AUTO W/SCOPE: CPT | Performed by: EMERGENCY MEDICINE

## 2024-10-31 PROCEDURE — 36415 COLL VENOUS BLD VENIPUNCTURE: CPT | Performed by: EMERGENCY MEDICINE

## 2024-10-31 PROCEDURE — 80048 BASIC METABOLIC PNL TOTAL CA: CPT | Performed by: EMERGENCY MEDICINE

## 2024-10-31 PROCEDURE — 99285 EMERGENCY DEPT VISIT HI MDM: CPT | Mod: 25

## 2024-10-31 PROCEDURE — 83735 ASSAY OF MAGNESIUM: CPT

## 2024-10-31 PROCEDURE — 250N000009 HC RX 250: Performed by: NURSE PRACTITIONER

## 2024-10-31 PROCEDURE — 87637 SARSCOV2&INF A&B&RSV AMP PRB: CPT | Performed by: EMERGENCY MEDICINE

## 2024-10-31 PROCEDURE — 96375 TX/PRO/DX INJ NEW DRUG ADDON: CPT

## 2024-10-31 PROCEDURE — 87040 BLOOD CULTURE FOR BACTERIA: CPT | Performed by: EMERGENCY MEDICINE

## 2024-10-31 PROCEDURE — 87149 DNA/RNA DIRECT PROBE: CPT | Performed by: EMERGENCY MEDICINE

## 2024-10-31 PROCEDURE — 71046 X-RAY EXAM CHEST 2 VIEWS: CPT

## 2024-10-31 PROCEDURE — 82803 BLOOD GASES ANY COMBINATION: CPT

## 2024-10-31 PROCEDURE — 99207 PR APP CREDIT; MD BILLING SHARED VISIT: CPT | Performed by: HOSPITALIST

## 2024-10-31 PROCEDURE — 87077 CULTURE AEROBIC IDENTIFY: CPT | Performed by: EMERGENCY MEDICINE

## 2024-10-31 PROCEDURE — 250N000013 HC RX MED GY IP 250 OP 250 PS 637: Performed by: NURSE PRACTITIONER

## 2024-10-31 PROCEDURE — 250N000011 HC RX IP 250 OP 636: Performed by: EMERGENCY MEDICINE

## 2024-10-31 PROCEDURE — 94640 AIRWAY INHALATION TREATMENT: CPT | Mod: 76

## 2024-10-31 PROCEDURE — 99207 PR NO BILLABLE SERVICE THIS VISIT: CPT | Performed by: NURSE PRACTITIONER

## 2024-10-31 PROCEDURE — 120N000001 HC R&B MED SURG/OB

## 2024-10-31 RX ORDER — AZITHROMYCIN 250 MG/1
250 TABLET, FILM COATED ORAL DAILY
Status: DISCONTINUED | OUTPATIENT
Start: 2024-11-01 | End: 2024-10-31

## 2024-10-31 RX ORDER — AMPICILLIN AND SULBACTAM 2; 1 G/1; G/1
3 INJECTION, POWDER, FOR SOLUTION INTRAMUSCULAR; INTRAVENOUS ONCE
Status: COMPLETED | OUTPATIENT
Start: 2024-10-31 | End: 2024-10-31

## 2024-10-31 RX ORDER — LEVOTHYROXINE SODIUM 100 UG/1
100 TABLET ORAL DAILY
Status: DISCONTINUED | OUTPATIENT
Start: 2024-11-01 | End: 2024-11-03 | Stop reason: HOSPADM

## 2024-10-31 RX ORDER — ALBUTEROL SULFATE 5 MG/ML
2.5 SOLUTION RESPIRATORY (INHALATION) EVERY 6 HOURS PRN
Status: DISCONTINUED | OUTPATIENT
Start: 2024-10-31 | End: 2024-11-03 | Stop reason: HOSPADM

## 2024-10-31 RX ORDER — AMOXICILLIN 250 MG
2 CAPSULE ORAL 2 TIMES DAILY PRN
Status: DISCONTINUED | OUTPATIENT
Start: 2024-10-31 | End: 2024-10-31

## 2024-10-31 RX ORDER — AMOXICILLIN 250 MG
2 CAPSULE ORAL 2 TIMES DAILY PRN
Status: DISCONTINUED | OUTPATIENT
Start: 2024-10-31 | End: 2024-11-03 | Stop reason: HOSPADM

## 2024-10-31 RX ORDER — AMOXICILLIN 250 MG
1 CAPSULE ORAL 2 TIMES DAILY PRN
Status: DISCONTINUED | OUTPATIENT
Start: 2024-10-31 | End: 2024-11-03 | Stop reason: HOSPADM

## 2024-10-31 RX ORDER — BENZOCAINE/MENTHOL 6 MG-10 MG
LOZENGE MUCOUS MEMBRANE 2 TIMES DAILY PRN
Status: DISCONTINUED | OUTPATIENT
Start: 2024-10-31 | End: 2024-11-03 | Stop reason: HOSPADM

## 2024-10-31 RX ORDER — SENNOSIDES 8.6 MG
1 TABLET ORAL 2 TIMES DAILY PRN
Status: DISCONTINUED | OUTPATIENT
Start: 2024-10-31 | End: 2024-10-31

## 2024-10-31 RX ORDER — AMOXICILLIN 250 MG
1 CAPSULE ORAL 2 TIMES DAILY PRN
Status: DISCONTINUED | OUTPATIENT
Start: 2024-10-31 | End: 2024-10-31

## 2024-10-31 RX ORDER — ONDANSETRON 4 MG/1
4 TABLET, ORALLY DISINTEGRATING ORAL EVERY 6 HOURS PRN
Status: DISCONTINUED | OUTPATIENT
Start: 2024-10-31 | End: 2024-11-03 | Stop reason: HOSPADM

## 2024-10-31 RX ORDER — ACETYLCYSTEINE 200 MG/ML
2 SOLUTION ORAL; RESPIRATORY (INHALATION) 4 TIMES DAILY
Status: DISCONTINUED | OUTPATIENT
Start: 2024-10-31 | End: 2024-11-03 | Stop reason: HOSPADM

## 2024-10-31 RX ORDER — HYDROCORTISONE SODIUM SUCCINATE 100 MG/2ML
50 INJECTION INTRAMUSCULAR; INTRAVENOUS 2 TIMES DAILY
Status: DISCONTINUED | OUTPATIENT
Start: 2024-10-31 | End: 2024-11-03 | Stop reason: HOSPADM

## 2024-10-31 RX ORDER — GLYCOPYRROLATE 1 MG/1
1 TABLET ORAL 2 TIMES DAILY PRN
Status: DISCONTINUED | OUTPATIENT
Start: 2024-10-31 | End: 2024-11-03 | Stop reason: HOSPADM

## 2024-10-31 RX ORDER — AZITHROMYCIN 500 MG/1
500 INJECTION, POWDER, LYOPHILIZED, FOR SOLUTION INTRAVENOUS ONCE
Status: COMPLETED | OUTPATIENT
Start: 2024-10-31 | End: 2024-10-31

## 2024-10-31 RX ORDER — CARBAMAZEPINE 100 MG/5ML
150 SUSPENSION ORAL 3 TIMES DAILY
Status: DISCONTINUED | OUTPATIENT
Start: 2024-11-01 | End: 2024-11-03 | Stop reason: HOSPADM

## 2024-10-31 RX ORDER — GLYCOPYRROLATE 1 MG/1
2 TABLET ORAL 2 TIMES DAILY
Status: DISCONTINUED | OUTPATIENT
Start: 2024-10-31 | End: 2024-11-03 | Stop reason: HOSPADM

## 2024-10-31 RX ORDER — POLYETHYLENE GLYCOL 3350 17 G/17G
17 POWDER, FOR SOLUTION ORAL DAILY
Status: DISCONTINUED | OUTPATIENT
Start: 2024-11-01 | End: 2024-11-03 | Stop reason: HOSPADM

## 2024-10-31 RX ORDER — ONDANSETRON 2 MG/ML
4 INJECTION INTRAMUSCULAR; INTRAVENOUS EVERY 6 HOURS PRN
Status: DISCONTINUED | OUTPATIENT
Start: 2024-10-31 | End: 2024-11-03 | Stop reason: HOSPADM

## 2024-10-31 RX ORDER — ACETAMINOPHEN 325 MG/1
650 TABLET ORAL EVERY 4 HOURS PRN
Status: DISCONTINUED | OUTPATIENT
Start: 2024-10-31 | End: 2024-11-03 | Stop reason: HOSPADM

## 2024-10-31 RX ORDER — POLYETHYLENE GLYCOL 3350 17 G/17G
17 POWDER, FOR SOLUTION ORAL DAILY
Status: DISCONTINUED | OUTPATIENT
Start: 2024-11-01 | End: 2024-10-31

## 2024-10-31 RX ORDER — CARBAMAZEPINE 100 MG/5ML
100 SUSPENSION ORAL DAILY
Status: DISCONTINUED | OUTPATIENT
Start: 2024-10-31 | End: 2024-11-03 | Stop reason: HOSPADM

## 2024-10-31 RX ORDER — AZITHROMYCIN 250 MG/1
250 TABLET, FILM COATED ORAL DAILY
Status: DISCONTINUED | OUTPATIENT
Start: 2024-11-01 | End: 2024-11-01

## 2024-10-31 RX ORDER — AMPICILLIN AND SULBACTAM 2; 1 G/1; G/1
3 INJECTION, POWDER, FOR SOLUTION INTRAMUSCULAR; INTRAVENOUS EVERY 6 HOURS
Status: DISCONTINUED | OUTPATIENT
Start: 2024-10-31 | End: 2024-11-03 | Stop reason: HOSPADM

## 2024-10-31 RX ORDER — ACETAMINOPHEN 650 MG/1
650 SUPPOSITORY RECTAL EVERY 4 HOURS PRN
Status: DISCONTINUED | OUTPATIENT
Start: 2024-10-31 | End: 2024-11-03 | Stop reason: HOSPADM

## 2024-10-31 RX ORDER — METOCLOPRAMIDE HYDROCHLORIDE 5 MG/5ML
10 SOLUTION ORAL
Status: DISCONTINUED | OUTPATIENT
Start: 2024-10-31 | End: 2024-11-03 | Stop reason: HOSPADM

## 2024-10-31 RX ORDER — GLYCOPYRROLATE 1 MG/1
1 TABLET ORAL 2 TIMES DAILY PRN
Status: ON HOLD | COMMUNITY
End: 2024-11-03

## 2024-10-31 RX ORDER — BACITRACIN ZINC 500 [USP'U]/G
OINTMENT TOPICAL DAILY PRN
Status: DISCONTINUED | OUTPATIENT
Start: 2024-10-31 | End: 2024-11-03 | Stop reason: HOSPADM

## 2024-10-31 RX ORDER — SODIUM CHLORIDE 1 G/1
1 TABLET ORAL 2 TIMES DAILY
Status: DISCONTINUED | OUTPATIENT
Start: 2024-10-31 | End: 2024-11-02

## 2024-10-31 RX ORDER — IPRATROPIUM BROMIDE AND ALBUTEROL SULFATE 2.5; .5 MG/3ML; MG/3ML
3 SOLUTION RESPIRATORY (INHALATION) 4 TIMES DAILY
Status: DISCONTINUED | OUTPATIENT
Start: 2024-10-31 | End: 2024-11-03 | Stop reason: HOSPADM

## 2024-10-31 RX ORDER — DEXTROMETHORPHAN HBR AND GUAIFENESIN 5; 100 MG/5ML; MG/5ML
20 LIQUID ORAL EVERY 4 HOURS
COMMUNITY

## 2024-10-31 RX ADMIN — HYDROCORTISONE SODIUM SUCCINATE 50 MG: 100 INJECTION, POWDER, FOR SOLUTION INTRAMUSCULAR; INTRAVENOUS at 20:27

## 2024-10-31 RX ADMIN — ACETYLCYSTEINE 2 ML: 200 SOLUTION ORAL; RESPIRATORY (INHALATION) at 19:35

## 2024-10-31 RX ADMIN — SODIUM CHLORIDE TAB 1 GM 1 G: 1 TAB at 20:41

## 2024-10-31 RX ADMIN — AZITHROMYCIN MONOHYDRATE 500 MG: 500 INJECTION, POWDER, LYOPHILIZED, FOR SOLUTION INTRAVENOUS at 15:03

## 2024-10-31 RX ADMIN — GLYCOPYRROLATE 2 MG: 1 TABLET ORAL at 20:41

## 2024-10-31 RX ADMIN — AMPICILLIN SODIUM AND SULBACTAM SODIUM 3 G: 2; 1 INJECTION, POWDER, FOR SOLUTION INTRAMUSCULAR; INTRAVENOUS at 20:22

## 2024-10-31 RX ADMIN — CARBAMAZEPINE 100 MG: 100 SUSPENSION ORAL at 18:32

## 2024-10-31 RX ADMIN — METOCLOPRAMIDE HYDROCHLORIDE 10 MG: 5 SOLUTION ORAL at 20:34

## 2024-10-31 RX ADMIN — BRIVARACETAM 100 MG: 10 SOLUTION ORAL at 20:40

## 2024-10-31 RX ADMIN — AMPICILLIN SODIUM AND SULBACTAM SODIUM 3 G: 2; 1 INJECTION, POWDER, FOR SOLUTION INTRAMUSCULAR; INTRAVENOUS at 14:41

## 2024-10-31 RX ADMIN — IPRATROPIUM BROMIDE AND ALBUTEROL SULFATE 3 ML: .5; 3 SOLUTION RESPIRATORY (INHALATION) at 19:35

## 2024-10-31 RX ADMIN — Medication 40 MG: at 20:34

## 2024-10-31 RX ADMIN — Medication 1 PACKET: at 18:32

## 2024-10-31 ASSESSMENT — ACTIVITIES OF DAILY LIVING (ADL)
ADLS_ACUITY_SCORE: 0

## 2024-10-31 NOTE — PHARMACY-ADMISSION MEDICATION HISTORY
Pharmacy Intern Admission Medication History    Admission medication history is complete. The information provided in this note is only as accurate as the sources available at the time of the update.    Information Source(s): Family member, Hospital records, and CareEverywhere/SureScripts via phone    Pertinent Information:   The patient's mother and caretaker Savannah verified the medication list.   The patient had his 6 AM and 12 PM dose of carbamazepine today and will need his 6 PM and midnight dose.    Changes made to PTA medication list:  Added: None  Deleted: None  Changed: Mucinex tablets BID PRN--> Mucinex Max liquid 20 mL every 4 hours    Allergies reviewed with patient and updates made in EHR: yes    Medication History Completed By: Mariana Villarreal 10/31/2024 3:56 PM    PTA Med List   Medication Sig Last Dose/Taking    acetaminophen (TYLENOL) 325 MG tablet Take 650 mg by mouth every 6 hours as needed for mild pain Taking As Needed    acetylcysteine (MUCOMYST) 20 % neb solution Take 2 mLs by nebulization 4 times daily (To use along with albuterol nebs) 10/31/2024 at  9:00 AM    albuterol (PROVENTIL) (5 MG/ML) 0.5% neb solution Take 0.5 mLs (2.5 mg) by nebulization every 6 hours as needed for shortness of breath, wheezing or cough 0700 1100 1500 1900 with mucomyst 10/31/2024 at  9:00 AM    bacitracin 500 UNIT/GM external ointment Apply topically daily as needed for wound care To PEG site. Taking As Needed    Brivaracetam (BRIVIACT) 10 MG/ML solution Take 100 mg by mouth or Feeding Tube 2 times daily 0900, 2100 10/31/2024 at  9:00 AM    carBAMazepine (TEGRETOL) 100 MG/5ML suspension Take 7.5 mLs (150 mg) by mouth or Feeding Tube 3 times daily. 10/31/2024 at  6:00 AM    carBAMazepine (TEGRETOL) 100 MG/5ML suspension Place 5 mLs (100 mg) into Feeding Tube daily. 10/30/2024 at  6:00 PM    COMPOUNDED NON-CONTROLLED SUBSTANCE (CMPD RX) - PHARMACY TO MIX COMPOUNDED MEDICATION Scopolamine 0.4mg capsules - take  2  "capsule by feeding tube three times daily as needed Taking    Cyanocobalamin (VITAMIN B-12 PO) Place 1,000 mcg into Feeding Tube every morning. 10/31/2024 at  9:00 AM    Dextromethorphan-guaiFENesin (MUCINEX FAST-MAX DM MAX) 5-100 MG/5ML LIQD Take 20 mLs by mouth every 4 hours. 10/31/2024 at  9:00 AM    glycopyrrolate (ROBINUL) 1 MG tablet Take 1 mg by mouth 2 times daily as needed for other (SECRETIONS). TAKE IN ADDITION TO 2 TABLETS TWICE DAILY AS NEEDED Taking As Needed    glycopyrrolate (ROBINUL) 1 MG tablet Take 2 tablets (2 mg) by mouth 2 times daily. TAKE 1 TABLET(1 MG) BY MOUTH TWICE DAILY AS NEEDED FOR SECRETIONS (Patient taking differently: Take 2 mg by mouth 2 times daily.) 10/31/2024 at  9:00 AM    hydrocortisone (CORTAID) 1 % external cream Apply topically 2 times daily as needed Apply to reddened memo areas as needed Taking As Needed    hydrocortisone (CORTEF) 5 MG tablet Take 15 mg (3 tablets) in the morning and 7.5 mg (1.5 tablet)  at 2:00 PM. During illness patient takes more as a stress dose. Please increase the dose as directed. 10/31/2024 at  9:00 AM    insulin syringe-needle U-100 (29G X 1/2\" 1 ML) 29G X 1/2\" 1 ML miscellaneous Syringe needle use as needed Taking    levothyroxine (SYNTHROID/LEVOTHROID) 100 MCG tablet Take 1 tablet (100 mcg) by mouth daily 10/31/2024 at  9:00 AM    metoclopramide (REGLAN) 10 MG/10ML SOLN solution TAKE 10 ML BY MOUTH FOUR TIMES DAILY(BEFORE MEALS AND NIGHTLY) AT 8 AM, 12 PM, 4 PM, AND 8 PM. 10/31/2024 at  8:00 AM    miconazole (MICATIN) 2 % external powder Apply topically 2 times daily as needed Taking As Needed    multivitamin, therapeutic (THERA-VIT) TABS tablet 1 tablet by Per Feeding Tube route daily 10/31/2024 at  9:00 AM    mupirocin (BACTROBAN) 2 % external ointment APPLY TOPICALLY TO THE AFFECTED AREA TWICE DAILY AS NEEDED Taking    Nutritional Supplements (BOOST HIGH PROTEIN) LIQD Take 6 Cans by mouth daily. 10/31/2024 at 12:00 PM    pantoprazole " (PROTONIX) 2 mg/mL SUSP suspension Place 20 mLs (40 mg) into Feeding Tube 2 times daily 10/31/2024 at  9:00 AM    polyethylene glycol (MIRALAX) 17 GM/Dose powder Take 17 g by mouth daily. 10/31/2024 at  9:00 AM    potassium & sodium phosphates (NEUTRA-PHOS) 280-160-250 MG Packet Take 1 packet by mouth daily 10/30/2024 at  9:00 AM    sennosides (SENOKOT) 8.6 MG tablet Take 1 tablet by mouth 2 times daily as needed for constipation Taking As Needed    sodium chloride 1 GM tablet Place 1 tablet (1 g) into Feeding Tube 2 times daily 10/31/2024 at  9:00 AM    testosterone cypionate (DEPOTESTOSTERONE) 200 MG/ML injection INJECT 0.25ML IN THE MUSCLE ONCE A WEEK. 10/24/2024    vitamin C (ASCORBIC ACID) 1000 MG TABS 3,000 mg by Oral or Feeding Tube route daily 10/31/2024 at  9:00 AM    vitamin D3 (CHOLECALCIFEROL) 2000 units (50 mcg) tablet 4,000 Units by Per Feeding Tube route daily 10/31/2024 at  9:00 AM
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
## 2024-10-31 NOTE — ED NOTES
[facility]  ED Nurse Handoff Report    ED Chief complaint: Fever      ED Diagnosis:   Final diagnoses:   Aspiration pneumonia of right lung, unspecified aspiration pneumonia type, unspecified part of lung (H)       Code Status: MD to determine    Allergies:   Allergies   Allergen Reactions    Valproic Acid Other (See Comments)     Toxicity w/ bone marrow suspension, elevated ammonia levels    Toxicity with bone marrow suspension   Elevated ammonia levels    Poisens system    Scopolamine Hives     Hives with the patch - oral no problem    Vancomycin Other (See Comments)     Vancomycin flushing syndrome - pt tolerated dose at half rate.    Phenytoin Sodium        Patient Story: fever  Focused Assessment:  Patient BIBA from home per EMS for fever. Recently admitted for pneumonia and discharged to home 5 days ago. X ray shows ongoing pneumonia, will be admitted again for continued treatment.     Treatments and/or interventions provided: See MAR  Patient's response to treatments and/or interventions: TBD    To be done/followed up on inpatient unit:  Close monitor    Does this patient have any cognitive concerns?:  na    Activity level - Baseline/Home:  Total Care  Activity Level - Current:   Total Care    Patient's Preferred language: English   Needed?: No    Isolation: None  Infection: Not Applicable  Patient tested for COVID 19 prior to admission: YES  Bariatric?: No    Vital Signs:   Vitals:    10/31/24 1426 10/31/24 1441 10/31/24 1456 10/31/24 1511   BP: 115/71  122/73    Pulse: 79 81 87 91   Resp: 12   16   Temp:       TempSrc:       SpO2: 92% 95% 93% 95%   Weight:       Height:           Cardiac Rhythm:     Was the PSS-3 completed:   Yes  What interventions are required if any?               Family Comments: na  OBS brochure/video discussed/provided to patient/family: N/A              Name of person given brochure if not patient: na              Relationship to patient: na    For the  majority of the shift this patient's behavior was Green.   Behavioral interventions performed were none.    ED NURSE PHONE NUMBER: *[phone]

## 2024-10-31 NOTE — PROGRESS NOTES
Med Reconciliation    - PTA meds reconciled   - will stop PTA cortef and stress dose given mild illness w/ hydrocortisone 50 mg BID    NATHANIEL Suresh CNP  [facility]  Securely message with the Vocera Web Console (learn more here)  Text page via [employer] Paging/Directory

## 2024-10-31 NOTE — ED TRIAGE NOTES
Patient BIBA from home for recurring fever. Discharged from hospital 5 days after being treated for pneumonia. Mother had recorded fever of 102, Tylenol given around 1045.
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
## 2024-10-31 NOTE — PLAN OF CARE
Goal Outcome Evaluation:       Summary:  Aspiration Pneumonia   DATE & TIME: 10/31/24 7413-7416    Cognitive Concerns/ Orientation : Nonverbal, HECTOR   BEHAVIOR & AGGRESSION TOOL COLOR: Green   CIWA SCORE: N/a    ABNL VS/O2: VSS, RA  MOBILITY: Total, turn and repositioning   PAIN MANAGMENT: appears comfortable  DIET: NPO, tube feedings-awaiting orders from nutrition for tube feeding  BOWEL/BLADDER: Incontinent bowel/bladder  x1, external catheter in place now   ABNL LAB/BG: CA 8.7  DRAIN/DEVICES: IV SL   TELEMETRY RHYTHM: N/a   SKIN: Blanchable redness coccyx/Sacrum, Mepilex placed, abrasion/scratches on legs   TESTS/PROCEDURES: Chest xray   D/C DAY/GOALS/PLACE: pending   OTHER IMPORTANT INFO: Lung sounds diminished- on scheduled Nebs.

## 2024-10-31 NOTE — ED TRIAGE NOTES
Triage Assessment (Adult)       Row Name 10/31/24 1248          Triage Assessment    Airway WDL WDL        Respiratory WDL    Respiratory WDL X        Skin Circulation/Temperature WDL    Skin Circulation/Temperature WDL X        Cardiac WDL    Cardiac WDL X        Peripheral/Neurovascular WDL    Peripheral Neurovascular WDL X        Cognitive/Neuro/Behavioral WDL    Cognitive/Neuro/Behavioral WDL X

## 2024-11-01 ENCOUNTER — APPOINTMENT (OUTPATIENT)
Dept: GENERAL RADIOLOGY | Facility: CLINIC | Age: 64
DRG: 178 | End: 2024-11-01
Payer: MEDICARE

## 2024-11-01 LAB
ANION GAP SERPL CALCULATED.3IONS-SCNC: 15 MMOL/L (ref 7–15)
BUN SERPL-MCNC: 19.8 MG/DL (ref 8–23)
CALCIUM SERPL-MCNC: 8.6 MG/DL (ref 8.8–10.4)
CHLORIDE SERPL-SCNC: 100 MMOL/L (ref 98–107)
CREAT SERPL-MCNC: 0.75 MG/DL (ref 0.67–1.17)
EGFRCR SERPLBLD CKD-EPI 2021: >90 ML/MIN/1.73M2
ERYTHROCYTE [DISTWIDTH] IN BLOOD BY AUTOMATED COUNT: 14.4 % (ref 10–15)
GLUCOSE BLDC GLUCOMTR-MCNC: 95 MG/DL (ref 70–99)
GLUCOSE SERPL-MCNC: 121 MG/DL (ref 70–99)
HCO3 SERPL-SCNC: 24 MMOL/L (ref 22–29)
HCT VFR BLD AUTO: 40.4 % (ref 40–53)
HGB BLD-MCNC: 12.8 G/DL (ref 13.3–17.7)
MAGNESIUM SERPL-MCNC: 2.2 MG/DL (ref 1.7–2.3)
MCH RBC QN AUTO: 28.4 PG (ref 26.5–33)
MCHC RBC AUTO-ENTMCNC: 31.7 G/DL (ref 31.5–36.5)
MCV RBC AUTO: 90 FL (ref 78–100)
PLATELET # BLD AUTO: 224 10E3/UL (ref 150–450)
POTASSIUM SERPL-SCNC: 3.8 MMOL/L (ref 3.4–5.3)
RBC # BLD AUTO: 4.51 10E6/UL (ref 4.4–5.9)
SODIUM SERPL-SCNC: 139 MMOL/L (ref 135–145)
WBC # BLD AUTO: 9.3 10E3/UL (ref 4–11)

## 2024-11-01 PROCEDURE — 250N000013 HC RX MED GY IP 250 OP 250 PS 637: Performed by: NURSE PRACTITIONER

## 2024-11-01 PROCEDURE — 250N000011 HC RX IP 250 OP 636: Performed by: NURSE PRACTITIONER

## 2024-11-01 PROCEDURE — 82947 ASSAY GLUCOSE BLOOD QUANT: CPT

## 2024-11-01 PROCEDURE — 85018 HEMOGLOBIN: CPT

## 2024-11-01 PROCEDURE — 120N000001 HC R&B MED SURG/OB

## 2024-11-01 PROCEDURE — 999N000065 XR ABDOMEN 1 VIEW

## 2024-11-01 PROCEDURE — 999N000157 HC STATISTIC RCP TIME EA 10 MIN

## 2024-11-01 PROCEDURE — 94640 AIRWAY INHALATION TREATMENT: CPT

## 2024-11-01 PROCEDURE — 250N000009 HC RX 250: Performed by: NURSE PRACTITIONER

## 2024-11-01 PROCEDURE — 80048 BASIC METABOLIC PNL TOTAL CA: CPT

## 2024-11-01 PROCEDURE — 36415 COLL VENOUS BLD VENIPUNCTURE: CPT

## 2024-11-01 PROCEDURE — 99232 SBSQ HOSP IP/OBS MODERATE 35: CPT

## 2024-11-01 PROCEDURE — 250N000013 HC RX MED GY IP 250 OP 250 PS 637: Performed by: HOSPITALIST

## 2024-11-01 PROCEDURE — 94640 AIRWAY INHALATION TREATMENT: CPT | Mod: 76

## 2024-11-01 RX ORDER — DEXTROSE MONOHYDRATE 100 MG/ML
INJECTION, SOLUTION INTRAVENOUS CONTINUOUS PRN
Status: DISCONTINUED | OUTPATIENT
Start: 2024-11-01 | End: 2024-11-03 | Stop reason: HOSPADM

## 2024-11-01 RX ADMIN — ACETYLCYSTEINE 2 ML: 200 SOLUTION ORAL; RESPIRATORY (INHALATION) at 11:36

## 2024-11-01 RX ADMIN — METOCLOPRAMIDE HYDROCHLORIDE 10 MG: 5 SOLUTION ORAL at 17:50

## 2024-11-01 RX ADMIN — BRIVARACETAM 100 MG: 10 SOLUTION ORAL at 21:45

## 2024-11-01 RX ADMIN — Medication 40 MG: at 21:47

## 2024-11-01 RX ADMIN — IPRATROPIUM BROMIDE AND ALBUTEROL SULFATE 3 ML: .5; 3 SOLUTION RESPIRATORY (INHALATION) at 21:04

## 2024-11-01 RX ADMIN — AMPICILLIN SODIUM AND SULBACTAM SODIUM 3 G: 2; 1 INJECTION, POWDER, FOR SOLUTION INTRAMUSCULAR; INTRAVENOUS at 09:13

## 2024-11-01 RX ADMIN — IPRATROPIUM BROMIDE AND ALBUTEROL SULFATE 3 ML: .5; 3 SOLUTION RESPIRATORY (INHALATION) at 08:29

## 2024-11-01 RX ADMIN — AMPICILLIN SODIUM AND SULBACTAM SODIUM 3 G: 2; 1 INJECTION, POWDER, FOR SOLUTION INTRAMUSCULAR; INTRAVENOUS at 16:56

## 2024-11-01 RX ADMIN — CARBAMAZEPINE 100 MG: 100 SUSPENSION ORAL at 17:53

## 2024-11-01 RX ADMIN — AMPICILLIN SODIUM AND SULBACTAM SODIUM 3 G: 2; 1 INJECTION, POWDER, FOR SOLUTION INTRAMUSCULAR; INTRAVENOUS at 21:41

## 2024-11-01 RX ADMIN — SODIUM CHLORIDE TAB 1 GM 1 G: 1 TAB at 21:47

## 2024-11-01 RX ADMIN — GLYCOPYRROLATE 2 MG: 1 TABLET ORAL at 21:46

## 2024-11-01 RX ADMIN — METOCLOPRAMIDE HYDROCHLORIDE 10 MG: 5 SOLUTION ORAL at 21:47

## 2024-11-01 RX ADMIN — POLYETHYLENE GLYCOL 3350 17 G: 17 POWDER, FOR SOLUTION ORAL at 09:13

## 2024-11-01 RX ADMIN — LEVOTHYROXINE SODIUM 100 MCG: 100 TABLET ORAL at 09:12

## 2024-11-01 RX ADMIN — AZITHROMYCIN DIHYDRATE 250 MG: 250 TABLET ORAL at 09:12

## 2024-11-01 RX ADMIN — Medication 40 MG: at 09:13

## 2024-11-01 RX ADMIN — IPRATROPIUM BROMIDE AND ALBUTEROL SULFATE 3 ML: .5; 3 SOLUTION RESPIRATORY (INHALATION) at 11:35

## 2024-11-01 RX ADMIN — ACETYLCYSTEINE 2 ML: 200 SOLUTION ORAL; RESPIRATORY (INHALATION) at 08:29

## 2024-11-01 RX ADMIN — CARBAMAZEPINE 150 MG: 100 SUSPENSION ORAL at 00:30

## 2024-11-01 RX ADMIN — ACETYLCYSTEINE 2 ML: 200 SOLUTION ORAL; RESPIRATORY (INHALATION) at 21:04

## 2024-11-01 RX ADMIN — HYDROCORTISONE SODIUM SUCCINATE 50 MG: 100 INJECTION, POWDER, FOR SOLUTION INTRAMUSCULAR; INTRAVENOUS at 21:43

## 2024-11-01 RX ADMIN — GLYCOPYRROLATE 2 MG: 1 TABLET ORAL at 09:13

## 2024-11-01 RX ADMIN — CARBAMAZEPINE 150 MG: 100 SUSPENSION ORAL at 05:42

## 2024-11-01 RX ADMIN — Medication 1 PACKET: at 09:12

## 2024-11-01 RX ADMIN — AMPICILLIN SODIUM AND SULBACTAM SODIUM 3 G: 2; 1 INJECTION, POWDER, FOR SOLUTION INTRAMUSCULAR; INTRAVENOUS at 02:44

## 2024-11-01 RX ADMIN — METOCLOPRAMIDE HYDROCHLORIDE 10 MG: 5 SOLUTION ORAL at 09:12

## 2024-11-01 RX ADMIN — HYDROCORTISONE SODIUM SUCCINATE 50 MG: 100 INJECTION, POWDER, FOR SOLUTION INTRAMUSCULAR; INTRAVENOUS at 09:11

## 2024-11-01 RX ADMIN — BRIVARACETAM 100 MG: 10 SOLUTION ORAL at 09:12

## 2024-11-01 ASSESSMENT — ACTIVITIES OF DAILY LIVING (ADL)
ADLS_ACUITY_SCORE: 0

## 2024-11-01 NOTE — CONSULTS
"CLINICAL NUTRITION SERVICES  -  ASSESSMENT NOTE    Recommendations Ordered by Registered Dietitian (RD):     Nutrition Support Enteral:  Type of Feeding Tube: GJ tube - feed into J port   Enteral Frequency:  22 hrs (hold 1 hr before/after Synthroid dose)  Enteral Regimen: - [employer] 1.5 @ goal of 60 ml x 22 hr (1320 ml/day)  Total Enteral Provisions: 1980 kcals (29 kcal/kg), 98 g PRO (1.4 g/kg), 182 g CHO, 12 g fiber, and 924 ml free H2O daily.   Free Water Flush: 120 ml q 4hrs     - Resume TF at 30 ml/hr and advance 15 ml q 4 hrs until reach goal rate       Malnutrition: Patient does not meet two of the above criteria necessary for diagnosing malnutrition     REASON FOR ASSESSMENT  Keyon Farias is a 64 year old male seen by Registered Dietitian for Provider Order - Registered Dietitian to Assess and Order TF per Medical Nutrition Therapy Protocol    PMH of:  TBI with aphasia, R sided spastic hemiplegia and dysphagia with GJ-tube for TFs/meds; seizure disorder; panhypopituitarism; and frequent hospital admissions for recurrent pneumonia with 9 prior hospitalizations in 2024 (most recently 10/18-10/26); who presents with fevers and cough, fever, and treated for pneumonia.     NUTRITION HISTORY  - Unable to obtain nutrition history as pt non-verbal  - Pt know to [facility]  - Briefly stopped by room, checked w/ RN   - Tube feeding history -     Addendum:   11/01 - Spoke w/ Savannah who restated everything she told the previous RD on 10/25. She added she was giving him some type of liquid Mucinex BID, which she thinks help w/ the phlegm. She also noted she has \"about 12 cases\" of [employer] at home. She thinks it's [employer] 1.5 and was told to take a picture of the label to know for sure. She didn't recall using [employer] in the past and is now open to trying it. She said she felt like Pt was sent home too early last time. She also think is lung function has been doing as well as in the past, per " "incentive spirometer ability, and noted that he enjoys using it but their one at home broke.     Change TF orders to TripleLift as follows,   - TripleLift 1.5 @ goal of 60 ml x 22 hr (1320 ml/day) will provide: 1980 kcals (29 kcal/kg), 98 g PRO (1.4 g/kg), 182 g CHO, 12 g fiber, and 924 ml free H2O daily.     10/25:  Received request to call pt's mother, Savannah, to discuss TF for home  Spoke with Savannah this am  She is very concerned about continued aspiration admissions  She has tried various TF formulas and find that they all cause \"phlegm/secretions\" (Karla, TripleLift)  She has recently tried Boost High Protein and feels pt might tolerate this a bit better    10/19:   Type of Feeding Tube: GJ tube - feed into J-port  Enteral Frequency:  Continuous  Enteral Regimen: Jevity 1.5 @ 60 mL/hr (hold for 1 hr before and 1 hr after Synthroid dose)  Total Enteral Provisions:  1980 kcal (30 kcal/kg), 85 g protein (1.3 g/kg), 1003 mL free water, 28 g fiber daily   Free Water Flush: 120 mL every 4 hrs for hydration     Resume TF at 45 mL/hr.  After 4 hrs, increase to goal.     CURRENT NUTRITION ORDERS  Diet Order:   NPO     Current Intake/Tolerance: NPO    NUTRITION FOCUSED PHYSICAL ASSESSMENT FOR DIAGNOSING MALNUTRITION)  Yes           Observed:    No nutrition-related physical findings observed  - Bed bound pt is know to have low lean muscle mass and fat stores     ANTHROPOMETRICS  Height: 5' 10\"  Weight: 150 lbs 0 oz   Body mass index is 21.52 kg/m .  Weight Status:  Normal BMI  IBW: 73 kg  % IBW: 93%  Weight History:   Wt Readings from Last 10 Encounters:   10/31/24 68 kg (150 lb)   10/21/24 68.1 kg (150 lb 2.1 oz)   09/18/24 68.1 kg (150 lb 2.1 oz)   08/31/24 67 kg (147 lb 11.3 oz)   08/05/24 67.6 kg (149 lb)   07/24/24 67.9 kg (149 lb 11.1 oz)   07/12/24 69.4 kg (153 lb)   06/04/24 74.8 kg (165 lb)   05/05/24 75.1 kg (165 lb 9.1 oz)   04/20/24 71.2 kg (156 lb 15.6 oz)     LABS  BUN 29.7 (H), BG " 128    MEDICATIONS  Daily Neutr-Phos  Synthroid once daily  On MAR (not yet given) : Vit B12, Vit C, Vit D3    ASSESSED NUTRITION NEEDS PER APPROVED PRACTICE GUIDELINES:  Dosing Weight 68 kg (admit bed scale)  Estimated Energy Needs: 1724-6300 kcals (25-30 Kcal/Kg)  Justification: maintenance  Estimated Protein Needs:  grams (1.2-1.5 g pro/Kg)  Justification: maintenance  Estimated Fluid Needs: 5553-1114  mL (1 mL/Kcal)  Justification: maintenance    MALNUTRITION:   % Weight Loss:  Weight loss does not meet criteria for malnutrition   % Intake:  Decreased intake does not meet criteria for malnutrition   Subcutaneous Fat Loss:  Chronic low baseline stores  Muscle Loss:  Chronic low baseline stores  Fluid Retention:  None noted    Malnutrition Diagnosis: Patient does not meet two of the above criteria necessary for diagnosing malnutrition    NUTRITION DIAGNOSIS:  No nutrition diagnosis identified at this time     NUTRITION INTERVENTIONS  Recommendations / Nutrition Prescription    Nutrition Support Enteral:  Type of Feeding Tube: GJ tube - feed into J port   Enteral Frequency:  22 hrs (hold 1 hr before/after Synthroid dose)  Enteral Regimen: - [place] [place] 1.5 @ goal of 60 ml x 22 hr (1320 ml/day)  Total Enteral Provisions: 1980 kcals (29 kcal/kg), 98 g PRO (1.4 g/kg), 182 g CHO, 12 g fiber, and 924 ml free H2O daily.   Free Water Flush: 120 ml q 4hrs     - Resume TF at 30 ml/hr and advance to goal after 4 hrs       Implementation  Nutrition education: Per Provider order if indicated   EN Composition, EN Schedule, and Feeding Tube Flush    Nutrition Goals  EN - to meet % estimated needs    MONITORING AND EVALUATION:  Progress towards goals will be monitored and evaluated per protocol and Practice Guidelines    Evangelist Aguilera MS, RD, LD
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
## 2024-11-01 NOTE — PLAN OF CARE
Goal Outcome Evaluation:      Plan of Care Reviewed With: patient           Shift:1900-0730    Summary:  Aspiration Pneumonia       Cognitive Concerns/ Orientation : HECTOR/nonverbal  BEHAVIOR & AGGRESSION TOOL COLOR: Green   CIWA SCORE: N/a    ABNL VS/O2: VSS, RA  MOBILITY: Ax2 with lift,turn and repo  PAIN MANAGMENT: no indicator of pain observed  DIET: NPO, tube feedings-no at this time,awaiting orders from nutrition for tube feeding  BOWEL/BLADDER: Incontinent B& B, external catheter in place  ABNL LAB/BG: BUN 29.7  DRAIN/DEVICES: IV SL   TELEMETRY RHYTHM: N/a   SKIN: Blanchable redness coccyx/Sacrum, Mepilex placed, abrasion/scratches on legs   TESTS/PROCEDURES: Chest xray completed  D/C DAY/GOALS/PLACE: pending /TBD  OTHER IMPORTANT INFO: Lung sounds diminished,Contact precaution maintained for MRSA and VRE.On Unasyn q6hrs

## 2024-11-02 LAB
ANION GAP SERPL CALCULATED.3IONS-SCNC: 16 MMOL/L (ref 7–15)
BUN SERPL-MCNC: 20 MG/DL (ref 8–23)
CALCIUM SERPL-MCNC: 8.4 MG/DL (ref 8.8–10.4)
CHLORIDE SERPL-SCNC: 105 MMOL/L (ref 98–107)
CREAT SERPL-MCNC: 0.76 MG/DL (ref 0.67–1.17)
EGFRCR SERPLBLD CKD-EPI 2021: >90 ML/MIN/1.73M2
ERYTHROCYTE [DISTWIDTH] IN BLOOD BY AUTOMATED COUNT: 14.5 % (ref 10–15)
GLUCOSE BLDC GLUCOMTR-MCNC: 116 MG/DL (ref 70–99)
GLUCOSE SERPL-MCNC: 136 MG/DL (ref 70–99)
HCO3 SERPL-SCNC: 22 MMOL/L (ref 22–29)
HCT VFR BLD AUTO: 37.5 % (ref 40–53)
HGB BLD-MCNC: 12.4 G/DL (ref 13.3–17.7)
MCH RBC QN AUTO: 29 PG (ref 26.5–33)
MCHC RBC AUTO-ENTMCNC: 33.1 G/DL (ref 31.5–36.5)
MCV RBC AUTO: 88 FL (ref 78–100)
PLATELET # BLD AUTO: 259 10E3/UL (ref 150–450)
POTASSIUM SERPL-SCNC: 3.8 MMOL/L (ref 3.4–5.3)
RBC # BLD AUTO: 4.27 10E6/UL (ref 4.4–5.9)
SODIUM SERPL-SCNC: 143 MMOL/L (ref 135–145)
WBC # BLD AUTO: 9.4 10E3/UL (ref 4–11)

## 2024-11-02 PROCEDURE — 250N000013 HC RX MED GY IP 250 OP 250 PS 637: Performed by: NURSE PRACTITIONER

## 2024-11-02 PROCEDURE — 250N000011 HC RX IP 250 OP 636

## 2024-11-02 PROCEDURE — 999N000157 HC STATISTIC RCP TIME EA 10 MIN

## 2024-11-02 PROCEDURE — 80048 BASIC METABOLIC PNL TOTAL CA: CPT

## 2024-11-02 PROCEDURE — 94640 AIRWAY INHALATION TREATMENT: CPT | Mod: 76

## 2024-11-02 PROCEDURE — 258N000003 HC RX IP 258 OP 636

## 2024-11-02 PROCEDURE — 36415 COLL VENOUS BLD VENIPUNCTURE: CPT

## 2024-11-02 PROCEDURE — 94640 AIRWAY INHALATION TREATMENT: CPT

## 2024-11-02 PROCEDURE — 99232 SBSQ HOSP IP/OBS MODERATE 35: CPT | Performed by: INTERNAL MEDICINE

## 2024-11-02 PROCEDURE — 120N000001 HC R&B MED SURG/OB

## 2024-11-02 PROCEDURE — 250N000009 HC RX 250: Performed by: NURSE PRACTITIONER

## 2024-11-02 PROCEDURE — 85014 HEMATOCRIT: CPT

## 2024-11-02 PROCEDURE — 250N000011 HC RX IP 250 OP 636: Performed by: NURSE PRACTITIONER

## 2024-11-02 RX ADMIN — HYDROCORTISONE SODIUM SUCCINATE 50 MG: 100 INJECTION, POWDER, FOR SOLUTION INTRAMUSCULAR; INTRAVENOUS at 20:29

## 2024-11-02 RX ADMIN — BRIVARACETAM 100 MG: 10 SOLUTION ORAL at 09:17

## 2024-11-02 RX ADMIN — HYDROCORTISONE SODIUM SUCCINATE 50 MG: 100 INJECTION, POWDER, FOR SOLUTION INTRAMUSCULAR; INTRAVENOUS at 09:17

## 2024-11-02 RX ADMIN — LEVOTHYROXINE SODIUM 100 MCG: 100 TABLET ORAL at 09:21

## 2024-11-02 RX ADMIN — METOCLOPRAMIDE HYDROCHLORIDE 10 MG: 5 SOLUTION ORAL at 20:31

## 2024-11-02 RX ADMIN — Medication 40 MG: at 20:31

## 2024-11-02 RX ADMIN — Medication 1 PACKET: at 09:21

## 2024-11-02 RX ADMIN — Medication 40 MG: at 09:21

## 2024-11-02 RX ADMIN — SODIUM CHLORIDE TAB 1 GM 1 G: 1 TAB at 09:21

## 2024-11-02 RX ADMIN — IPRATROPIUM BROMIDE AND ALBUTEROL SULFATE 3 ML: .5; 3 SOLUTION RESPIRATORY (INHALATION) at 20:43

## 2024-11-02 RX ADMIN — AZITHROMYCIN MONOHYDRATE 250 MG: 500 INJECTION, POWDER, LYOPHILIZED, FOR SOLUTION INTRAVENOUS at 09:17

## 2024-11-02 RX ADMIN — METOCLOPRAMIDE HYDROCHLORIDE 10 MG: 5 SOLUTION ORAL at 11:53

## 2024-11-02 RX ADMIN — CARBAMAZEPINE 100 MG: 100 SUSPENSION ORAL at 18:05

## 2024-11-02 RX ADMIN — ACETYLCYSTEINE 2 ML: 200 SOLUTION ORAL; RESPIRATORY (INHALATION) at 07:41

## 2024-11-02 RX ADMIN — METOCLOPRAMIDE HYDROCHLORIDE 10 MG: 5 SOLUTION ORAL at 15:24

## 2024-11-02 RX ADMIN — GLYCOPYRROLATE 2 MG: 1 TABLET ORAL at 09:21

## 2024-11-02 RX ADMIN — AMPICILLIN SODIUM AND SULBACTAM SODIUM 3 G: 2; 1 INJECTION, POWDER, FOR SOLUTION INTRAMUSCULAR; INTRAVENOUS at 11:52

## 2024-11-02 RX ADMIN — ACETYLCYSTEINE 2 ML: 200 SOLUTION ORAL; RESPIRATORY (INHALATION) at 11:12

## 2024-11-02 RX ADMIN — IPRATROPIUM BROMIDE AND ALBUTEROL SULFATE 3 ML: .5; 3 SOLUTION RESPIRATORY (INHALATION) at 07:41

## 2024-11-02 RX ADMIN — IPRATROPIUM BROMIDE AND ALBUTEROL SULFATE 3 ML: .5; 3 SOLUTION RESPIRATORY (INHALATION) at 11:12

## 2024-11-02 RX ADMIN — IPRATROPIUM BROMIDE AND ALBUTEROL SULFATE 3 ML: .5; 3 SOLUTION RESPIRATORY (INHALATION) at 14:46

## 2024-11-02 RX ADMIN — AMPICILLIN SODIUM AND SULBACTAM SODIUM 3 G: 2; 1 INJECTION, POWDER, FOR SOLUTION INTRAMUSCULAR; INTRAVENOUS at 18:05

## 2024-11-02 RX ADMIN — AMPICILLIN SODIUM AND SULBACTAM SODIUM 3 G: 2; 1 INJECTION, POWDER, FOR SOLUTION INTRAMUSCULAR; INTRAVENOUS at 06:14

## 2024-11-02 RX ADMIN — BRIVARACETAM 100 MG: 10 SOLUTION ORAL at 22:33

## 2024-11-02 RX ADMIN — ACETYLCYSTEINE 2 ML: 200 SOLUTION ORAL; RESPIRATORY (INHALATION) at 14:46

## 2024-11-02 RX ADMIN — CARBAMAZEPINE 150 MG: 100 SUSPENSION ORAL at 11:53

## 2024-11-02 RX ADMIN — CARBAMAZEPINE 150 MG: 100 SUSPENSION ORAL at 06:16

## 2024-11-02 RX ADMIN — GLYCOPYRROLATE 2 MG: 1 TABLET ORAL at 20:30

## 2024-11-02 RX ADMIN — METOCLOPRAMIDE HYDROCHLORIDE 10 MG: 5 SOLUTION ORAL at 09:20

## 2024-11-02 RX ADMIN — ACETYLCYSTEINE 2 ML: 200 SOLUTION ORAL; RESPIRATORY (INHALATION) at 20:44

## 2024-11-02 RX ADMIN — CARBAMAZEPINE 150 MG: 100 SUSPENSION ORAL at 00:55

## 2024-11-02 ASSESSMENT — ACTIVITIES OF DAILY LIVING (ADL)
ADLS_ACUITY_SCORE: 0
TOILETING: 2-->COMPLETELY DEPENDENT
CONCENTRATING,_REMEMBERING_OR_MAKING_DECISIONS_DIFFICULTY: YES
ADLS_ACUITY_SCORE: 0
DRESSING/BATHING: BATHING DIFFICULTY, DEPENDENT;DRESSING DIFFICULTY, DEPENDENT
ADLS_ACUITY_SCORE: 0
ADLS_ACUITY_SCORE: 0
FALL_HISTORY_WITHIN_LAST_SIX_MONTHS: NO
ADLS_ACUITY_SCORE: 0
DRESSING/BATHING_DIFFICULTY: YES
ADLS_ACUITY_SCORE: 0
DIFFICULTY_COMMUNICATING: YES
ADLS_ACUITY_SCORE: 0
ADLS_ACUITY_SCORE: 0
WALKING_OR_CLIMBING_STAIRS: TRANSFERRING DIFFICULTY, DEPENDENT;AMBULATION DIFFICULTY, DEPENDENT;STAIR CLIMBING DIFFICULTY, DEPENDENT
ADLS_ACUITY_SCORE: 0
DIFFICULTY_EATING/SWALLOWING: YES
EATING/SWALLOWING: SWALLOWING SOLID FOOD;SWALLOWING LIQUIDS;EATING
WEAR_GLASSES_OR_BLIND: NO
TOILETING_ASSISTANCE: TOILETING DIFFICULTY, DEPENDENT
ADLS_ACUITY_SCORE: 0
ADLS_ACUITY_SCORE: 0
WALKING_OR_CLIMBING_STAIRS_DIFFICULTY: YES
TOILETING_ISSUES: YES
ADLS_ACUITY_SCORE: 0
CHANGE_IN_FUNCTIONAL_STATUS_SINCE_ONSET_OF_CURRENT_ILLNESS/INJURY: YES
HEARING_DIFFICULTY_OR_DEAF: NO
ADLS_ACUITY_SCORE: 0
DOING_ERRANDS_INDEPENDENTLY_DIFFICULTY: YES
TOILETING: 2-->COMPLETELY DEPENDENT (NOT DEVELOPMENTALLY APPROPRIATE)

## 2024-11-02 NOTE — PLAN OF CARE
Goal Outcome Evaluation:       11/1/24 - 11/2/24  [id number]  Cognitive Concerns/ Orientation: HECTOR - Nonverbal/alert  BEHAVIOR & AGGRESSION TOOL COLOR: Green   CIWA: NA   ABNL VS/O2: VSS on RA  MOBILITY: Total/lift. T/R Q2 while in bed.  PAIN MANAGMENT: No non-verbal signs of pain  DIET: NPO, tube feeding at goal rate of 60 mL/hr with 120 mL flushes Q4 hours   BOWEL/BLADDER: Incontinent of bowel and bladder, external catheter in place.    ABNL LAB/BG: Ca 8.6  DRAIN/DEVICES: Peripheral IV SL left arm. Intermittent antibiotics and steroids   TELEMETRY RHYTHM: NA  SKIN: Blanchable redness coccyx/sacrum with mepilex in place. Jessica rash - miconazole powder applied. Abrasion/scratches on legs   TESTS/PROCEDURES: XR abdomen completed Friday to verify G-tube placement prior to feedings/Meds  D/C DAY/GOALS/PLACE: pending   OTHER IMPORTANT INFO: Aspiration precautions maintained. Nutrition following
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
## 2024-11-02 NOTE — PLAN OF CARE
Goal Outcome Evaluation:    Summary: Aspiration Pneumonia     DATE & TIME: 11/1/24 7311-9255   Cognitive Concerns/ Orientation: HECTOR - Nonverbal/alert  BEHAVIOR & AGGRESSION TOOL COLOR: Green   CIWA: NA   ABNL VS/O2: VSS on RA  MOBILITY: Total/lift. T/R Q2 while in bed.  PAIN MANAGMENT: No non-verbal signs of pain  DIET: NPO, tube feeding initiated at 1800 @ 30 mL/hr with 120 mL flushes Q4 hours (advance to goal rate 60 mL/hr @ 2200)  BOWEL/BLADDER: Inc. B&B, external catheter in place. BM x4 this shift  ABNL LAB/BG: Ca 8.6  DRAIN/DEVICES: R PIV SL, intermittent abx.  TELEMETRY RHYTHM: NA  SKIN: Blanchable redness coccyx/sacrum with mepilex in place. Jessica rash - miconazole powder applied. Abrasion/scratches on legs   TESTS/PROCEDURES: XR abdomen completed today to verify G-tube placement prior to feedings/Meds  D/C DAY/GOALS/PLACE: pending   OTHER IMPORTANT INFO: Aspiration precautions maintained. Nutrition following

## 2024-11-02 NOTE — PLAN OF CARE
Goal Outcome Evaluation:    Summary: Aspiration Pneumonia     DATE & TIME: 11/2/24 2903-4597  Cognitive Concerns/ Orientation: HECTOR - Nonverbal/alert  BEHAVIOR & AGGRESSION TOOL COLOR: Green   CIWA: NA   ABNL VS/O2: VSS on RA  MOBILITY: Total/lift. T/R Q2 while in bed.  PAIN MANAGMENT: No non-verbal signs of pain  DIET: NPO, tube feeding at goal rate of 60 mL/hr with 120 mL flushes Q4 hours   BOWEL/BLADDER: Inc. B&B. No BM this shift   ABNL LAB/BG: Ca 8.4  DRAIN/DEVICES: L PIV SL, intermittent abx.  TELEMETRY RHYTHM: NA  SKIN: Blanchable redness coccyx/sacrum with mepilex in place. Jessica rash - powder applied. Abrasion/scratches on legs   TESTS/PROCEDURES: none new  D/C DAY/GOALS/PLACE: pending   OTHER IMPORTANT INFO: Aspiration precautions maintained. Nutrition following
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
## 2024-11-03 VITALS
OXYGEN SATURATION: 100 % | HEIGHT: 70 IN | WEIGHT: 150.1 LBS | DIASTOLIC BLOOD PRESSURE: 61 MMHG | SYSTOLIC BLOOD PRESSURE: 134 MMHG | HEART RATE: 85 BPM | BODY MASS INDEX: 21.49 KG/M2 | TEMPERATURE: 96.9 F | RESPIRATION RATE: 18 BRPM

## 2024-11-03 LAB
ENTEROCOCCUS FAECALIS: NOT DETECTED
ENTEROCOCCUS FAECIUM: NOT DETECTED
GLUCOSE BLDC GLUCOMTR-MCNC: 112 MG/DL (ref 70–99)
LISTERIA SPECIES (DETECTED/NOT DETECTED): NOT DETECTED
STAPHYLOCOCCUS AUREUS: NOT DETECTED
STAPHYLOCOCCUS EPIDERMIDIS: NOT DETECTED
STAPHYLOCOCCUS LUGDUNENSIS: NOT DETECTED
STAPHYLOCOCCUS SPECIES: NOT DETECTED
STREPTOCOCCUS AGALACTIAE: NOT DETECTED
STREPTOCOCCUS ANGINOSUS GROUP: NOT DETECTED
STREPTOCOCCUS PNEUMONIAE: NOT DETECTED
STREPTOCOCCUS PYOGENES: NOT DETECTED
STREPTOCOCCUS SPECIES: NOT DETECTED

## 2024-11-03 PROCEDURE — 250N000013 HC RX MED GY IP 250 OP 250 PS 637: Performed by: NURSE PRACTITIONER

## 2024-11-03 PROCEDURE — 250N000011 HC RX IP 250 OP 636: Performed by: NURSE PRACTITIONER

## 2024-11-03 PROCEDURE — 94640 AIRWAY INHALATION TREATMENT: CPT

## 2024-11-03 PROCEDURE — 99207 PR NO BILLABLE SERVICE THIS VISIT: CPT | Performed by: INTERNAL MEDICINE

## 2024-11-03 PROCEDURE — 94640 AIRWAY INHALATION TREATMENT: CPT | Mod: 76

## 2024-11-03 PROCEDURE — 99239 HOSP IP/OBS DSCHRG MGMT >30: CPT | Performed by: INTERNAL MEDICINE

## 2024-11-03 PROCEDURE — 250N000009 HC RX 250: Performed by: NURSE PRACTITIONER

## 2024-11-03 PROCEDURE — 258N000003 HC RX IP 258 OP 636

## 2024-11-03 PROCEDURE — 999N000157 HC STATISTIC RCP TIME EA 10 MIN

## 2024-11-03 PROCEDURE — 250N000011 HC RX IP 250 OP 636

## 2024-11-03 PROCEDURE — 250N000013 HC RX MED GY IP 250 OP 250 PS 637: Performed by: HOSPITALIST

## 2024-11-03 RX ORDER — MULTIVIT WITH IRON,MINERALS
15 LIQUID (ML) ORAL DAILY
Qty: 472 ML | Refills: 0 | Status: SHIPPED | OUTPATIENT
Start: 2024-11-03

## 2024-11-03 RX ORDER — GLYCOPYRROLATE 1 MG/5ML
2 SOLUTION ORAL 2 TIMES DAILY
Qty: 600 ML | Refills: 0 | Status: SHIPPED | OUTPATIENT
Start: 2024-11-03

## 2024-11-03 RX ORDER — ACETAMINOPHEN 160 MG/5ML
650 LIQUID ORAL EVERY 6 HOURS PRN
Qty: 473 ML | Refills: 0 | Status: SHIPPED | OUTPATIENT
Start: 2024-11-03 | End: 2024-11-03

## 2024-11-03 RX ORDER — GLYCOPYRROLATE 1 MG/5ML
2 SOLUTION ORAL 2 TIMES DAILY
Qty: 600 ML | Refills: 0 | Status: SHIPPED | OUTPATIENT
Start: 2024-11-03 | End: 2024-11-03

## 2024-11-03 RX ORDER — MULTIVIT WITH IRON,MINERALS
15 LIQUID (ML) ORAL DAILY
Qty: 472 ML | Refills: 0 | Status: SHIPPED | OUTPATIENT
Start: 2024-11-03 | End: 2024-11-03

## 2024-11-03 RX ORDER — ACETAMINOPHEN 160 MG/5ML
650 LIQUID ORAL EVERY 6 HOURS PRN
Qty: 473 ML | Refills: 0 | Status: SHIPPED | OUTPATIENT
Start: 2024-11-03

## 2024-11-03 RX ADMIN — BRIVARACETAM 100 MG: 10 SOLUTION ORAL at 08:29

## 2024-11-03 RX ADMIN — CARBAMAZEPINE 150 MG: 100 SUSPENSION ORAL at 06:25

## 2024-11-03 RX ADMIN — Medication 1 PACKET: at 08:30

## 2024-11-03 RX ADMIN — METOCLOPRAMIDE HYDROCHLORIDE 10 MG: 5 SOLUTION ORAL at 13:26

## 2024-11-03 RX ADMIN — CARBAMAZEPINE 150 MG: 100 SUSPENSION ORAL at 13:29

## 2024-11-03 RX ADMIN — ACETYLCYSTEINE 2 ML: 200 SOLUTION ORAL; RESPIRATORY (INHALATION) at 14:30

## 2024-11-03 RX ADMIN — AZITHROMYCIN MONOHYDRATE 250 MG: 500 INJECTION, POWDER, LYOPHILIZED, FOR SOLUTION INTRAVENOUS at 08:29

## 2024-11-03 RX ADMIN — Medication 40 MG: at 08:28

## 2024-11-03 RX ADMIN — POLYETHYLENE GLYCOL 3350 17 G: 17 POWDER, FOR SOLUTION ORAL at 08:29

## 2024-11-03 RX ADMIN — AMPICILLIN SODIUM AND SULBACTAM SODIUM 3 G: 2; 1 INJECTION, POWDER, FOR SOLUTION INTRAMUSCULAR; INTRAVENOUS at 06:24

## 2024-11-03 RX ADMIN — LEVOTHYROXINE SODIUM 100 MCG: 100 TABLET ORAL at 08:30

## 2024-11-03 RX ADMIN — IPRATROPIUM BROMIDE AND ALBUTEROL SULFATE 3 ML: .5; 3 SOLUTION RESPIRATORY (INHALATION) at 07:35

## 2024-11-03 RX ADMIN — HYDROCORTISONE SODIUM SUCCINATE 50 MG: 100 INJECTION, POWDER, FOR SOLUTION INTRAMUSCULAR; INTRAVENOUS at 08:28

## 2024-11-03 RX ADMIN — IPRATROPIUM BROMIDE AND ALBUTEROL SULFATE 3 ML: .5; 3 SOLUTION RESPIRATORY (INHALATION) at 14:30

## 2024-11-03 RX ADMIN — METOCLOPRAMIDE HYDROCHLORIDE 10 MG: 5 SOLUTION ORAL at 15:58

## 2024-11-03 RX ADMIN — METOCLOPRAMIDE HYDROCHLORIDE 10 MG: 5 SOLUTION ORAL at 08:30

## 2024-11-03 RX ADMIN — CARBAMAZEPINE 150 MG: 100 SUSPENSION ORAL at 00:07

## 2024-11-03 RX ADMIN — GLYCOPYRROLATE 2 MG: 1 TABLET ORAL at 08:30

## 2024-11-03 RX ADMIN — AMPICILLIN SODIUM AND SULBACTAM SODIUM 3 G: 2; 1 INJECTION, POWDER, FOR SOLUTION INTRAMUSCULAR; INTRAVENOUS at 00:04

## 2024-11-03 RX ADMIN — AMPICILLIN SODIUM AND SULBACTAM SODIUM 3 G: 2; 1 INJECTION, POWDER, FOR SOLUTION INTRAMUSCULAR; INTRAVENOUS at 13:24

## 2024-11-03 RX ADMIN — ACETYLCYSTEINE 2 ML: 200 SOLUTION ORAL; RESPIRATORY (INHALATION) at 07:35

## 2024-11-03 ASSESSMENT — ACTIVITIES OF DAILY LIVING (ADL)
ADLS_ACUITY_SCORE: 0
DEPENDENT_IADLS:: CLEANING;COOKING;LAUNDRY;SHOPPING;MEAL PREPARATION;MEDICATION MANAGEMENT;MONEY MANAGEMENT;TRANSPORTATION
ADLS_ACUITY_SCORE: 0

## 2024-11-03 NOTE — DISCHARGE SUMMARY
Discharge    Patient discharged to home via stretcher with [facility] transportation  Care plan note: Non/verbal but alert. No non-verbal signs of pain. Continuous tube feedings running through GJ tube at goal rate of 60 mL/hour with every four hour 120 mL flushes. BM x1 this shift. Total cares. PIV removed. Discharge medications and paperwork gone over with Mother on the phone and able read back with clear understanding.    Listed belongings gathered and given to patient (including from security/pharmacy). Yes  Care Plan and Patient education resolved: Yes  Prescriptions if needed, hard copies sent with patient  Yes  Medication Bin checked and emptied on discharge Yes  SW/care coordinator/charge RN aware of discharge: Yes

## 2024-11-03 NOTE — PLAN OF CARE
Goal Outcome Evaluation:             11/2/24 - 11/3/24 [id number]  Cognitive Concerns/ Orientation: HECTOR - Nonverbal/alert  BEHAVIOR & AGGRESSION TOOL COLOR: Green   CIWA: NA   ABNL VS/O2: VSS on RA  MOBILITY: Total/lift. T/R Q2 while in bed.  PAIN MANAGMENT: No non-verbal signs of pain  DIET: NPO, tube feeding at goal rate of 60 mL/hr with 120 mL flushes Q4 hours   BOWEL/BLADDER: Incontinent of bowel and bladder   ABNL LAB/BG: Ca 8.4, blood cultures from 10/31 are positive for gram + cocci in linsey, MD aware.   DRAIN/DEVICES: L PIV SL, intermittent abx.  TELEMETRY RHYTHM: NA  SKIN: Blanchable redness coccyx/sacrum with mepilex in place. Jessica rash - powder applied. Abrasion/scratches on legs   TESTS/PROCEDURES: none new  D/C DAY/GOALS/PLACE: pending   OTHER IMPORTANT INFO: Aspiration precautions maintained. Nutrition following

## 2024-11-03 NOTE — PROGRESS NOTES
Care Management Discharge Note    Discharge Date: 11/03/2024       Discharge Disposition: Home Care    Discharge Services:      Discharge DME:      Discharge Transportation: agency ([facility])    Private pay costs discussed: Not applicable    Does the patient's insurance plan have a 3 day qualifying hospital stay waiver?  No    PAS Confirmation Code:    Patient/family educated on Medicare website which has current facility and service quality ratings:      Education Provided on the Discharge Plan:    Persons Notified of Discharge Plans: mother and [facility]  Patient/Family in Agreement with the Plan:  yes    Handoff Referral Completed: No, handoff not indicated or clinically appropriate    Additional Information:  See earlier consult note    Sharon Hathaway, LICSW

## 2024-11-03 NOTE — CONSULTS
Care Management Initial Consult    General Information  Assessment completed with:  Savannah  Type of CM/SW Visit: Initial Assessment    Primary Care Provider verified and updated as needed:     Readmission within the last 30 days: previous discharge plan unsuccessful         Advance Care Planning:            Communication Assessment  Patient's communication style: spoken language (English or Bilingual)    Hearing Difficulty or Deaf: no   Wear Glasses or Blind: no    Cognitive  Cognitive/Neuro/Behavioral: .WDL except, orientation, speech  Level of Consciousness: alert  Arousal Level: opens eyes spontaneously  Orientation: other (see comments) (HECTOR - nonverbal)  Mood/Behavior: calm, cooperative     Speech: unable to speak    Living Environment:   People in home: parent(s)  Savannah  Current living Arrangements: house      Able to return to prior arrangements: yes       Family/Social Support:  Care provided by: parent(s), homecare agency  Provides care for: no one, unable/limited ability to care for self  Marital Status: Single  Support system: Parent(s)          Description of Support System: Supportive, Involved    Support Assessment: Caregiver experiencing high stress    Current Resources:   Patient receiving home care services: Yes  Skilled Home Care Services: Skilled Nursing, Home Health Aid, Physical Therapy, Occupational Therapy     Community Resources:    Equipment currently used at home: hospital bed, lift device, wheelchair, manual  Supplies currently used at home:      Employment/Financial:  Employment Status: disabled        Financial Concerns:             Does the patient's insurance plan have a 3 day qualifying hospital stay waiver?  No    Lifestyle & Psychosocial Needs:  Social Drivers of Health     Food Insecurity: Unknown (11/2/2024)    Food Insecurity     Within the past 12 months, did you worry that your food would run out before you got money to buy more?: Patient unable to answer     Within the  past 12 months, did the food you bought just not last and you didn t have money to get more?: Patient unable to answer   Depression: Not at risk (8/5/2024)    PHQ-2     PHQ-2 Score: 1   Housing Stability: Unknown (11/2/2024)    Housing Stability     Do you have housing? : Patient unable to answer     Are you worried about losing your housing?: Patient unable to answer   Tobacco Use: Medium Risk (10/2/2024)    Received from [facility]    Patient History     Smoking Tobacco Use: Former     Smokeless Tobacco Use: Never     Passive Exposure: Not on file   Financial Resource Strain: Unknown (11/2/2024)    Financial Resource Strain     Within the past 12 months, have you or your family members you live with been unable to get utilities (heat, electricity) when it was really needed?: Patient unable to answer   Alcohol Use: Not on file   Transportation Needs: Unknown (11/2/2024)    Transportation Needs     Within the past 12 months, has lack of transportation kept you from medical appointments, getting your medicines, non-medical meetings or appointments, work, or from getting things that you need?: Patient unable to answer   Physical Activity: Not on file   Interpersonal Safety: Unknown (11/2/2024)    Interpersonal Safety     Do you feel physically and emotionally safe where you currently live?: Patient unable to answer     Within the past 12 months, have you been hit, slapped, kicked or otherwise physically hurt by someone?: Patient unable to answer     Within the past 12 months, have you been humiliated or emotionally abused in other ways by your partner or ex-partner?: Patient unable to answer   Stress: Not on file   Social Connections: Not on file   Health Literacy: Not on file       Functional Status:  Prior to admission patient needed assistance:   Dependent ADLs:: Bathing, Dressing, Eating, Grooming, Incontinence, Positioning, Transfers, Wheelchair-with assist, Toileting  Dependent IADLs:: Cleaning,  Cooking, Laundry, Shopping, Meal Preparation, Medication Management, Money Management, Transportation  Assesssment of Functional Status: At functional baseline    Mental Health Status:  Mental Health Status: No Current Concerns       Chemical Dependency Status:  Chemical Dependency Status: No Current Concerns             Values/Beliefs:  Spiritual, Cultural Beliefs, Zoroastrianism Practices, Values that affect care:                 Discussed  Partnership in Safe Discharge Planning  document with patient/family: No    Additional Information:  Per H&P,  Keyon Farias is a 64 year old male with history including  TBI with aphasia, R sided spastic hemiplegia and dysphagia with GJ-tube for TFs/meds; seizure disorder; panhypopituitarism; and frequent hospital admissions for recurrent pneumonia with 9 prior hospitalizations in 2024 (most recently 10/18-10/26); who presents with fevers and cough, fever, and treated for pneumonia.   .  Coordinated discharge with mother Savannah.     Reviewed with Savannah that resumption home care orders thru [facility] are being sent to the agency. Mother expresses she doesn't like a lot of people coming into the house with potential of spreading germs to Keyon.  She also said therapy comes and gives her exercises for Keyon to be completed by her causing her more work then she already provides in caring for her son.Writer did say say can accept or decline home care services.    Sent orders to [facility], [facility] thru [facility] and spoke with the agency to confirm they received the In[facility].     Arranged the [facility] transport for a time which works for her.  S will arrive here between 4:45 and 5:10.  PCS completed electronically   Communicated to bedside RN.      Next Steps: no further involvement      DEJA Allen

## 2024-11-03 NOTE — SIGNIFICANT EVENT
Significant Event Note    Time of event: 10:40 PM November 2, 2024    Description of event:  I was notified of the following :  Blood culture: Gram positive cocci in clusters    Plan:  Continue Unasyn and Azithromycin   Follow culture results    Discussed with: bedside nurse    Tremayne Leroy MD

## 2024-11-03 NOTE — PROVIDER NOTIFICATION
Blood cultures from 10/31 at 1503:  gram positive cocci in clusters.  Vocera message sent to hospitalist.  No new orders at this time.

## 2024-11-03 NOTE — ED NOTES
Bed: ED07  Expected date: 5/14/20  Expected time: 8:05 PM  Means of arrival: Ambulance  Comments:  Kerri M3 59M vomiting,fever, hyptensive   See H&P.

## 2024-11-03 NOTE — PROGRESS NOTES
"  GASTROENTEROLOGY PROGRESS NOTE     IMPRESSION:  1.  Recurrent aspiration pneumonia-the patient has GJ tube for tube feeds and meds.  Per the chart, it appears that the GJ does get clogged.  The differential diagnosis for recurrent aspiration events includes oropharyngeal aspiration on his oral secretions, possibly related to clogging events of the GJ tube, reflux events (GJ tubes decreased frequency of aspiration events, however, they do not eliminate the possibility of these events) and patient is on twice daily PPI.  - Patient did have an abdominal pelvic CT scan September 2024 which did not show any small bowel abnormalities suggesting an obstructive process.  - Patient did have recent endoscopic evaluation of his upper GI tract July 2024 at time of his endoscopic ultrasound.  There was just evidence of gastrostomy present in the gastric body.  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  - If frequent issues with the GJ tube clogging or malpositioned, could consider surgical jejunostomy  - Agree with plans for all liquid meds  - Would defer any tubefeed rate limit possibilities to nutrition/dietician     2.  Constipation-continue MiraLAX daily.  For more persistent constipation events in the future, could escalate up to MiraLAX twice a day, senna daily, then twice a day, if no bowel movement for 2 to 3 days, could consider fleets enema.    RECOMMENDATIONS:  - We will sign off, please call with any questions or concerns.      Anthony Hernandez MD  [facility]  [phone]      ________________________________________________________________________      SUBJECTIVE:  No acute events overnight, patient is non-verbal.        OBJECTIVE:  /69 (BP Location: Left arm)   Pulse 76   Temp 97.1  F (36.2  C) (Axillary)   Resp 18   Ht 1.778 m (5' 10\")   Wt " 68.1 kg (150 lb 1.6 oz)   SpO2 95%   BMI 21.54 kg/m    Temp (24hrs), Av.3  F (36.3  C), Min:97.1  F (36.2  C), Max:97.6  F (36.4  C)    Patient Vitals for the past 72 hrs:   Weight   24 0623 68.1 kg (150 lb 1.6 oz)   10/31/24 1701 68 kg (150 lb)   10/31/24 1240 68 kg (150 lb)        PHYSICAL EXAM  GEN: NAD.    ABD: +BS, non-tender, non-distended, no rebound or guarding.    Additional Data:  I have reviewed the patient's new clinical lab results:     Recent Labs   Lab Test 24  0533 24  1410 10/31/24  1242 23  0304 23  1840 22  2053 22  0035 22  0327 22  2248   WBC 9.4 9.3 10.7   < > 15.7*   < > 8.4   < >  --    HGB 12.4* 12.8* 13.7   < > 17.2   < > 12.2*   < >  --    MCV 88 90 88   < > 88   < > 89   < >  --     224 273   < > 227   < > 264   < >  --    INR  --   --   --   --  1.17*  --  1.15  --  1.22*    < > = values in this interval not displayed.     Recent Labs   Lab Test 24  0742 24  0830 24  0533 24  1410 24  1107 10/31/24  1242   NA  --   --  143 139  --  136   POTASSIUM  --   --  3.8 3.8  --  4.2   CHLORIDE  --   --  105 100  --  100   CO2  --   --   24  --  28   BUN  --   --  20.0 19.8  --  29.7*   CR  --   --  0.76 0.75  --  0.94   ANIONGAP  --   --  16* 15  --  8   STEVE  --   --  8.4* 8.6*  --  8.7*   * 116* 136* 121*   < > 128*    < > = values in this interval not displayed.     Recent Labs   Lab Test 10/31/24  1344 10/18/24  2301 24  0712 24  0850 09/15/24  0158 24  1830 24  1516 24  1427 24  2220 24  2022 23  1851 23  1840   ALBUMIN  --   --  3.3* 3.6 4.0  --    < > 3.6   < > 3.8   < > 3.7   BILITOTAL  --   --  0.2 0.3 0.4  --    < > 0.2   < > 0.2   < > 0.5   ALT  --   --  33 50 91*  --    < > 25   < > 20   < > 31   AST  --   --  33 53* 138*  --    < > 41   < > 23   < > 85*   PROTEIN Negative Negative  --   --   --  20*   < >  --    < >  --    < >   --    LIPASE  --   --   --   --   --   --   --  76*  --  107*  --  88*    < > = values in this interval not displayed.
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
## 2024-11-03 NOTE — PROVIDER NOTIFICATION
MD Notification    Notified Person: MD    Notified Person Name: Dr. Vicente    Notification Date/Time: 11/3/24 1016    Notification Interaction: Vocera message    Purpose of Notification: FYI blood culture collected on 10/31 was  positive for staphylococcus hominis     Orders Received: Ok    Comments:
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
## 2024-11-04 ENCOUNTER — NURSE TRIAGE (OUTPATIENT)
Dept: NURSING | Facility: CLINIC | Age: 64
End: 2024-11-04
Payer: MEDICARE

## 2024-11-04 ENCOUNTER — HOSPITAL ENCOUNTER (EMERGENCY)
Facility: CLINIC | Age: 64
Discharge: HOME | End: 2024-11-04
Attending: EMERGENCY MEDICINE | Admitting: EMERGENCY MEDICINE
Payer: MEDICARE

## 2024-11-04 ENCOUNTER — PATIENT OUTREACH (OUTPATIENT)
Dept: CARE COORDINATION | Facility: CLINIC | Age: 64
End: 2024-11-04
Payer: MEDICARE

## 2024-11-04 VITALS
RESPIRATION RATE: 18 BRPM | HEART RATE: 63 BPM | DIASTOLIC BLOOD PRESSURE: 72 MMHG | SYSTOLIC BLOOD PRESSURE: 128 MMHG | OXYGEN SATURATION: 98 % | TEMPERATURE: 98.6 F

## 2024-11-04 DIAGNOSIS — J69.0 ASPIRATION PNEUMONIA, UNSPECIFIED ASPIRATION PNEUMONIA TYPE, UNSPECIFIED LATERALITY, UNSPECIFIED PART OF LUNG (H): ICD-10-CM

## 2024-11-04 DIAGNOSIS — J69.0 ASPIRATION PNEUMONIA OF BOTH LUNGS, UNSPECIFIED ASPIRATION PNEUMONIA TYPE, UNSPECIFIED PART OF LUNG (H): ICD-10-CM

## 2024-11-04 DIAGNOSIS — R50.9 FEVER, UNSPECIFIED FEVER CAUSE: ICD-10-CM

## 2024-11-04 LAB
ALBUMIN SERPL BCG-MCNC: 3.7 G/DL (ref 3.5–5.2)
ALP SERPL-CCNC: 131 U/L (ref 40–150)
ALT SERPL W P-5'-P-CCNC: 36 U/L (ref 0–70)
ANION GAP SERPL CALCULATED.3IONS-SCNC: 10 MMOL/L (ref 7–15)
AST SERPL W P-5'-P-CCNC: 34 U/L (ref 0–45)
BACTERIA BLD CULT: ABNORMAL
BACTERIA BLD CULT: ABNORMAL
BASE EXCESS BLDV CALC-SCNC: 2 MMOL/L (ref -3–3)
BASOPHILS # BLD AUTO: 0.1 10E3/UL (ref 0–0.2)
BASOPHILS NFR BLD AUTO: 1 %
BILIRUB SERPL-MCNC: 0.2 MG/DL
BUN SERPL-MCNC: 21.7 MG/DL (ref 8–23)
CALCIUM SERPL-MCNC: 8.4 MG/DL (ref 8.8–10.4)
CHLORIDE SERPL-SCNC: 104 MMOL/L (ref 98–107)
CREAT SERPL-MCNC: 1 MG/DL (ref 0.67–1.17)
EGFRCR SERPLBLD CKD-EPI 2021: 85 ML/MIN/1.73M2
EOSINOPHIL # BLD AUTO: 0.2 10E3/UL (ref 0–0.7)
EOSINOPHIL NFR BLD AUTO: 2 %
ERYTHROCYTE [DISTWIDTH] IN BLOOD BY AUTOMATED COUNT: 14.8 % (ref 10–15)
GLUCOSE SERPL-MCNC: 124 MG/DL (ref 70–99)
HCO3 BLDV-SCNC: 27 MMOL/L (ref 21–28)
HCO3 SERPL-SCNC: 26 MMOL/L (ref 22–29)
HCT VFR BLD AUTO: 38.4 % (ref 40–53)
HGB BLD-MCNC: 12.3 G/DL (ref 13.3–17.7)
HOLD SPECIMEN: NORMAL
IMM GRANULOCYTES # BLD: 0 10E3/UL
IMM GRANULOCYTES NFR BLD: 0 %
LACTATE BLD-SCNC: 1.4 MMOL/L
LYMPHOCYTES # BLD AUTO: 1.7 10E3/UL (ref 0.8–5.3)
LYMPHOCYTES NFR BLD AUTO: 20 %
MCH RBC QN AUTO: 28.7 PG (ref 26.5–33)
MCHC RBC AUTO-ENTMCNC: 32 G/DL (ref 31.5–36.5)
MCV RBC AUTO: 90 FL (ref 78–100)
MONOCYTES # BLD AUTO: 0.3 10E3/UL (ref 0–1.3)
MONOCYTES NFR BLD AUTO: 3 %
NEUTROPHILS # BLD AUTO: 6.3 10E3/UL (ref 1.6–8.3)
NEUTROPHILS NFR BLD AUTO: 73 %
NRBC # BLD AUTO: 0 10E3/UL
NRBC BLD AUTO-RTO: 0 /100
PCO2 BLDV: 43 MM HG (ref 40–50)
PH BLDV: 7.41 [PH] (ref 7.32–7.43)
PLATELET # BLD AUTO: 253 10E3/UL (ref 150–450)
PO2 BLDV: 54 MM HG (ref 25–47)
POTASSIUM SERPL-SCNC: 4 MMOL/L (ref 3.4–5.3)
PROCALCITONIN SERPL IA-MCNC: 0.06 NG/ML
PROT SERPL-MCNC: 7.5 G/DL (ref 6.4–8.3)
RBC # BLD AUTO: 4.28 10E6/UL (ref 4.4–5.9)
SAO2 % BLDV: 88 % (ref 70–75)
SODIUM SERPL-SCNC: 140 MMOL/L (ref 135–145)
WBC # BLD AUTO: 8.6 10E3/UL (ref 4–11)

## 2024-11-04 PROCEDURE — 82803 BLOOD GASES ANY COMBINATION: CPT

## 2024-11-04 PROCEDURE — 84145 PROCALCITONIN (PCT): CPT | Performed by: EMERGENCY MEDICINE

## 2024-11-04 PROCEDURE — 80053 COMPREHEN METABOLIC PANEL: CPT | Performed by: EMERGENCY MEDICINE

## 2024-11-04 PROCEDURE — 99283 EMERGENCY DEPT VISIT LOW MDM: CPT

## 2024-11-04 PROCEDURE — 36415 COLL VENOUS BLD VENIPUNCTURE: CPT | Performed by: EMERGENCY MEDICINE

## 2024-11-04 PROCEDURE — 85004 AUTOMATED DIFF WBC COUNT: CPT | Performed by: EMERGENCY MEDICINE

## 2024-11-04 ASSESSMENT — ACTIVITIES OF DAILY LIVING (ADL)
ADLS_ACUITY_SCORE: 0

## 2024-11-04 NOTE — PROGRESS NOTES
Clinic Care Coordination Contact  Transitions of Care Outreach  Chief Complaint   Patient presents with    Clinic Care Coordination - Post Hospital       Most Recent Admission Date: 10/31/2024   Most Recent Admission Diagnosis: Aspiration pneumonia of right lung, unspecified aspiration pneumonia type, unspecified part of lung (H) - J69.0     Most Recent Discharge Date: 11/3/2024   Most Recent Discharge Diagnosis: Aspiration pneumonia of right lung, unspecified aspiration pneumonia type, unspecified part of lung (H) - J69.0  Aspiration pneumonia of both lungs, unspecified aspiration pneumonia type, unspecified part of lung (H) - J69.0  Aspiration pneumonia, unspecified aspiration pneumonia type, unspecified laterality, unspecified part of lung (H) - J69.0  Unspecified intracranial injury with loss of consciousness status unknown, initial encounter (H) - S06.9XAA     Transitions of Care Assessment    Discharge Assessment  How are you doing now that you are home?: Doing good.  How are your symptoms? (Red Flag symptoms escalate to triage hotline per guidelines): Improved  Do you know how to contact your clinic care team if you have future questions or changes to your health status? : Yes  Does the patient have their discharge instructions? : Yes  Does the patient have questions regarding their discharge instructions? : No  Were you started on any new medications or were there changes to any of your previous medications? : Yes  Does the patient have all of their medications?: Yes (Did have trouble finding Augmentin liquid d/t availability & PA needed.)  Do you have questions regarding any of your medications? : Yes (see comment) (Mom states was told to transition to liquid medications. Referred to schedule with MTM to discuss. Reviewed [facility] compounding pharmacy.)  Do you have all of your needed medical supplies or equipment (DME)?  (i.e. oxygen tank, CPAP, cane, etc.): Yes         Post-op (Clinicians Only)  Did the patient  have surgery or a procedure: No  Fever: No  Chills: No  Eating & Drinking: NPO (Tube feedings.)  PO Intake: other (Tube feeding)  Additional Symptoms:  (Denies)  Bowel Function: normal  Urinary Status: voiding without complaint/concerns    Care Management   None    Follow up Plan     Mom declined Care Coordination services at this time.   Mom is aware of how to initiate Care Coordination services with the clinic in the future.     Mom has medication list reviewed.   Mom alerted me that [facility] consult in this hospitalization recommended changing medications over to liquid.   Reviewed would be good to review with MTM, PCP and GI if needed.   Mom will schedule with MTM & PCP. Will consider moving up GI appointment if needed as well.   Also reviewed that liquid medications would need a specific order plus potentially to be rx'd to a compounding pharmacy due to new regulations.       Discharge Follow-Up  Discharge follow up appointment scheduled in alignment with recommended follow up timeframe or Transitions of Risk Category? (Low = within 30 days; Moderate= within 14 days; High= within 7 days): No  Patient's follow up appointment not scheduled: Patient accepted scheduling support. Appt scheduled/requested per protocol.    Future Appointments   Date Time Provider Department Center   12/18/2024 12:30 PM Mark Huntley MD [facility]    1/6/2025 11:00 AM Shea Bojorquez RD [facility]   5/2/2025 12:15 PM Juana Tierney PA-C [facility]       Outpatient Plan as outlined on AVS reviewed with patient.    For any urgent concerns, please contact our 24 hour nurse triage line: [phone] ([phone])       Courtney Nuñez RN

## 2024-11-05 NOTE — ED TRIAGE NOTES
Patient BIBA from home with recurrent fever.  He was discharged yesterday after being hospitalized for pneumonia.  He was discharged with an antibiotic prescription, but his mother was unable to fill it until this afternoon.  He took his first dose tonight.  Mother noted fever of 100.3 and called EMS to have him reevaluated.     Triage Assessment (Adult)       Row Name 11/04/24 2004          Triage Assessment    Airway WDL WDL        Respiratory WDL    Respiratory WDL X;cough      Cough Frequency infrequent      Cough Type productive         Skin Circulation/Temperature WDL    Skin Circulation/Temperature WDL WDL        Cardiac WDL    Cardiac WDL WDL        Peripheral/Neurovascular WDL    Peripheral Neurovascular WDL WDL        Cognitive/Neuro/Behavioral WDL    Cognitive/Neuro/Behavioral WDL X;orientation;speech     Level of Consciousness alert     Arousal Level opens eyes spontaneously     Orientation other (see comments)   Nonverbal     Speech unable to speak      Mood/Behavior calm;cooperative        [place] Coma Scale    Best Eye Response 4-->(E4) spontaneous     Best Motor Response 6-->(M6) obeys commands     Best Verbal Response 2-->(V2) incomprehensible speech     [place] Coma Scale Score 12

## 2024-11-05 NOTE — ED PROVIDER NOTES
Emergency Department Note      History of Present Illness     Chief Complaint   Fever      HPI   Keyon Farias is a 64 year old male with history of CKD, sepsis, TBI, cardiac arrest who presents at the emergency department for evaluation of fever. Nursing staff report that the patient was discharged yesterday after being hospitalized for pneumonia. They states that he was prescribed antibiotics, but just took his first dose this evening. Staff notes that the patient was brought in due to recurrent fever. Patient history of is limited, due to Keyon's nonverbal status.     Later discussed patient with wife.  Patient did not get his morning dose of antibiotic because she had difficulty getting it filled at the pharmacy.  She provided him with this dose this afternoon.  She noted a fever at home and called a doctor line who directed them to the emergency department.    Independent Historian   Mother as detailed above.    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 Traumatic brain injury   DVT of upper extremity   Gastro-oesophageal reflux disease   Panhypopituitarism   Seizures   Septic shock   Spastic hemiplegia affecting dominant side   Thyroid disease   Tracheostomy care    Traumatic brain injury   Cerebral artery occlusion with cerebral infarction  Urinary tract infection)  Ventricular fibrillation   Ventricular tachyarrhythmia    CKD    Medications   Albuterol  Augmentin  Briviact  Tegretol  Cortef  Cuvposa  Synthroid  Reglan  Centrum  Protonix  Senokot  Depotestosterone    Surgical History   Endoscopic ultrasound  EDG x5  Reconstruction facial surgery  Gastro jejunostomy tube change, recurrent   PICC exchange, left  Appendectomy  Right hand repair orthopedic  Tracheostomy  Vascular surgery    Physical Exam     Patient Vitals for the past 24 hrs:   BP Temp Temp src Pulse Resp SpO2   11/04/24 2004 128/72 98.1  F (36.7  C)  Temporal 80 18 91 %     Physical Exam  Eyes:  The pupils are equal and round    Conjunctivae and sclerae are normal  ENT:    The nose is normal    Pinnae are normal    The oropharynx is dry  CV:  Regular rate and rhythm     No edema  Resp:  Lungs are clear    Non-labored    No rales    No wheezing   GI:  Abdomen is soft and non-distended, there is no rigidity    Enteral tube in place  MS:  Normal muscular tone    No asymmetric leg swelling  Skin:  No rash or acute skin lesions noted  Neuro:   Awake, alert.      Face is symmetric.       Diagnostics     Lab Results   Labs Ordered and Resulted from Time of ED Arrival to Time of ED Departure   COMPREHENSIVE METABOLIC PANEL - Abnormal       Result Value    Sodium 140      Potassium 4.0      Carbon Dioxide (CO2) 26      Anion Gap 10      Urea Nitrogen 21.7      Creatinine 1.00      GFR Estimate 85      Calcium 8.4 (*)     Chloride 104      Glucose 124 (*)     Alkaline Phosphatase 131      AST 34      ALT 36      Protein Total 7.5      Albumin 3.7      Bilirubin Total 0.2     CBC WITH PLATELETS AND DIFFERENTIAL - Abnormal    WBC Count 8.6      RBC Count 4.28 (*)     Hemoglobin 12.3 (*)     Hematocrit 38.4 (*)     MCV 90      MCH 28.7      MCHC 32.0      RDW 14.8      Platelet Count 253      % Neutrophils 73      % Lymphocytes 20      % Monocytes 3      % Eosinophils 2      % Basophils 1      % Immature Granulocytes 0      NRBCs per 100 WBC 0      Absolute Neutrophils 6.3      Absolute Lymphocytes 1.7      Absolute Monocytes 0.3      Absolute Eosinophils 0.2      Absolute Basophils 0.1      Absolute Immature Granulocytes 0.0      Absolute NRBCs 0.0     ISTAT GASES LACTATE VENOUS POCT - Abnormal    Lactic Acid POCT 1.4      Bicarbonate Venous POCT 27      O2 Sat, Venous POCT 88 (*)     pCO2 Venous POCT 43      pH Venous POCT 7.41      pO2 Venous POCT 54 (*)     Base Excess/Deficit (+/-) POCT 2.0     PROCALCITONIN - Normal    Procalcitonin 0.06         Imaging   No orders to  display       Independent Interpretation   None    ED Course      Medications Administered   Medications - No data to display    Procedures   Procedures     Discussion of Management   None    ED Course   ED Course as of 11/05/24 0124 Mon Nov 04, 2024 2026 I obtained history and examined the patient as noted above     2138 I called Sharlene, patient's mother, and explained findings.         Additional Documentation  None    Medical Decision Making / Diagnosis     CMS Diagnoses: None    MIPS       None    MDM   Keyon Farias is a 64 year old male who presents to the emergency department with concerns about a fever.  Patient was discharged from the hospital yesterday for aspiration pneumonia.  There was a delay in getting him one of his doses of antibiotics this morning due to difficulty getting it filled at the pharmacy.  Patient here is afebrile with a normal pulse rate and oxygenation.  Blood pressure is also normal.  Laboratory workup was initiated including a CBC, lactic acid and a procalcitonin level.  Procalcitonin level, lactic acid level and CBC were all normal.  At this time given his vital signs and laboratory workup I do not think that rehospitalization is necessary.  Recommended continue to monitor at home for any worsening of symptoms.  Discussed findings with patient's mother.  Patient will be discharged back to home.  Return with any worrisome symptoms.    Disposition   The patient was discharged.     Diagnosis     ICD-10-CM    1. Fever, unspecified fever cause  R50.9       2. Aspiration pneumonia, unspecified aspiration pneumonia type, unspecified laterality, unspecified part of lung (H)  J69.0              Scribe Disclosure:  IAury, am serving as a scribe at 8:26 PM on 11/4/2024 to document services personally performed by Rome Trejo MD based on my observations and the provider's statements to me.        Rome Trejo MD  11/05/24 0128

## 2024-11-05 NOTE — ED NOTES
Patient's mother called reqeusting an update.  She reported noticing low grade fever around 1630 today when giving his first dose of antibiotics.  She gave 1,000 mg of tylenol at that time. Just prior to calling EMS, she noted his temperature increased to 100.7.  She is requesting an update when results are complete.

## 2024-11-05 NOTE — ED NOTES
Bed: ST03  Expected date:   Expected time:   Means of arrival:   Comments:  Kerri 64M fever, bed ridden

## 2024-11-05 NOTE — TELEPHONE ENCOUNTER
Nurse Triage SBAR    Is this a 2nd Level Triage? YES, LICENSED PRACTITIONER REVIEW IS REQUIRED    Situation: Pneumonia on Abx.post hospitalization    Background:   -I don't know what to do, they wanted to switch all meds to liquids,   Couldn't get to pharm, Prior Auth. Got liquid medicine this afternoon @1630    Assessment:   -Prior to Hospitalization, pt had fever at home, per mom report is not sure when pt. Had last fever in hospital  -Discharged, got home at approx. 1700 last evening after 5 days hospitalized,  Getting IV Abx in hospital, unsure of last fever in hospital or if tylenol being given prn   -Mom could not get all of pt's medicines liquids so start of JT Abx was delayed  -Gave Amox-Clav (Augmentin) via liquid today, 1st dose just given at 1630, (approx. 24 hours had passed), mom just got from Pharmacy due to Prior Auth. delay  -Temperature 100.6 approx. 1600 (axillary) -Tylenol crushed 1000 mg at 1700 given JT , repeat temperature 40 minutes ago 100.3, now recheck temperature @ 1830 back up to 100.6  --Pt. Has G/J tube: Only J tube continuous feeding and pt receives meds (Only Pantoprazole, Metroclopramide in G tube)  -Per mom, she is concerned about pt. This afternoon, pt.does not feel as well as when he came home   -Now Vitals: Oxygen sats low 90's HR 89,   -Mom thought Wheezing, (RN did not hear wheezing, heard audible UAC)  -Per triage questions: no increase in resp. Distress     Protocol Recommended Disposition:   Call PCP Now, See More Appropriate Guideline    Recommendation: -Advised mom to Give 2nd dose of Augmentin at 2330 tonight, via J tube  Explained to Mom, on call provider will be paged, RN will callback with physician's recommendations  Gave [phone], ask  to talk to triage RN  If pt. Is directed to be seen tonight, Mom will call 911 for transportation          Provider consult indicated.     Reason for page: Discharged from hospital with Asp. Pneumonia, 24 hours ago, Mom  "wondering why he has a new fever? Also J tube Abx were just started today @1630, due to prior auth.   -Pt. Has new temp of 100.6, tonight, mom reports he does not look as well as compared to yesterday      Page sent to Dr. Luis Angel Goodrich by RN at [phone].     Karen Hurley RN         Provider, Dr. Goodrich, returning page to Nurse Advisors at [phone]    Provider recommended plan of care: \"If he has temp. And being off Abx. For 24 hours, probably needs to go back in if he's looking worse, he needs to be seen again, Have him go to ER now\".     Phone unsuccessful attempt x1, left voicemail requesting mom to callback  Phone 2nd attempt, successful, reviewed MD's recommendations for pt. To return to ER tonight and mom verbalized understanding. Stated \" I will call 911 for transport to the ER\".    Karen Hurley RN                 Reason for Disposition   Discharged in past month from the hospital with a diagnosis of pneumonia   [1] Caller has URGENT question AND [2] triager unable to answer question    Additional Information   Negative: Sounds like a life-threatening emergency to the triager   Negative: Discharged in past month from the hospital with a diagnosis of chronic obstructive pulmonic disease (COPD)   Negative: Discharged in past month from the hospital with a diagnosis of congestive heart failure (CHF) or heart failure (HF)   Negative: Discharged in past month from the hospital with a diagnosis of heart attack (myocardial infarction)   Negative: SEVERE difficulty breathing (e.g., struggling for each breath, speaks in single words)   Negative: Bluish (or gray) lips or face now   Negative: Difficult to awaken or acting confused (e.g., disoriented, slurred speech)   Negative: Stridor (harsh sound while breathing in)   Negative: Slow, shallow and weak breathing   Negative: Sounds like a life-threatening emergency to the triager   Negative: Recently diagnosed by a doctor (or NP/PA) with pneumonia and not " hospitalized   Negative: New-onset or worsening chest pain   Negative: Oxygen level (e.g., pulse oximetry) 90 percent or lower     Oxygen sats: low 90's, now 92%   Negative: MODERATE difficulty breathing (e.g., speaks in phrases, SOB even at rest, pulse 100-120)   Negative: Fever > 103 F (39.4 C)   Negative: [1] Coughed up blood AND [2] > 1 tablespoon (15 ml)   (Exception: Blood-tinged sputum.)   Negative: Patient sounds very sick or weak to the triager    Protocols used: Post-Hospitalization Follow-up Call-A-, Pneumonia on Antibiotic Post-Hospitalization Follow-up Call-A-  Karen Hurley RN, Triage Nurse Advisor, 11/4/2024 7:11 PM

## 2024-11-07 DIAGNOSIS — Z53.9 DIAGNOSIS NOT YET DEFINED: Primary | ICD-10-CM

## 2024-11-07 PROCEDURE — G0180 MD CERTIFICATION HHA PATIENT: HCPCS | Performed by: INTERNAL MEDICINE

## 2024-11-10 DIAGNOSIS — G40.803 INTRACTABLE EPILEPSY DUE TO EXTERNAL CAUSES WITH STATUS EPILEPTICUS (H): ICD-10-CM

## 2024-11-10 DIAGNOSIS — E03.9 HYPOTHYROIDISM, UNSPECIFIED TYPE: ICD-10-CM

## 2024-11-14 RX ORDER — CARBAMAZEPINE 100 MG/5ML
150 SUSPENSION ORAL 3 TIMES DAILY
Qty: 2025 ML | Refills: 3 | Status: SHIPPED | OUTPATIENT
Start: 2024-11-14

## 2024-11-15 ENCOUNTER — DOCUMENTATION ONLY (OUTPATIENT)
Dept: LAB | Facility: CLINIC | Age: 64
End: 2024-11-15
Payer: MEDICARE

## 2024-11-15 DIAGNOSIS — E03.9 HYPOTHYROIDISM, UNSPECIFIED TYPE: Primary | ICD-10-CM

## 2024-11-15 NOTE — TELEPHONE ENCOUNTER
Patents mom/ guardian was called. Pt has scheduled future lab only visit. TSH order pended. Please sign if correct order and add any PRN labs.     If pt needs fasting labs, lab visit with need to be rescheduled.

## 2024-11-18 ENCOUNTER — LAB (OUTPATIENT)
Dept: LAB | Facility: CLINIC | Age: 64
End: 2024-11-18
Payer: MEDICARE

## 2024-11-18 DIAGNOSIS — E29.1 HYPOGONADISM MALE: ICD-10-CM

## 2024-11-18 DIAGNOSIS — E03.9 HYPOTHYROIDISM, UNSPECIFIED TYPE: ICD-10-CM

## 2024-11-18 LAB — TSH SERPL DL<=0.005 MIU/L-ACNC: <0.01 UIU/ML (ref 0.3–4.2)

## 2024-11-18 PROCEDURE — 36415 COLL VENOUS BLD VENIPUNCTURE: CPT

## 2024-11-18 PROCEDURE — 84403 ASSAY OF TOTAL TESTOSTERONE: CPT

## 2024-11-18 PROCEDURE — 84443 ASSAY THYROID STIM HORMONE: CPT

## 2024-11-19 RX ORDER — LEVOTHYROXINE SODIUM 88 UG/1
88 TABLET ORAL DAILY
Qty: 90 TABLET | Refills: 0 | Status: SHIPPED | OUTPATIENT
Start: 2024-11-19

## 2024-11-20 LAB — TESTOST SERPL-MCNC: 803 NG/DL (ref 240–950)

## 2024-11-29 ENCOUNTER — APPOINTMENT (OUTPATIENT)
Dept: GENERAL RADIOLOGY | Facility: CLINIC | Age: 64
End: 2024-11-29
Attending: PHYSICIAN ASSISTANT
Payer: MEDICARE

## 2024-11-29 ENCOUNTER — HOSPITAL ENCOUNTER (EMERGENCY)
Facility: CLINIC | Age: 64
Discharge: HOME | End: 2024-11-30
Attending: PHYSICIAN ASSISTANT | Admitting: PHYSICIAN ASSISTANT
Payer: MEDICARE

## 2024-11-29 DIAGNOSIS — R50.9 FEVER: ICD-10-CM

## 2024-11-29 LAB
ALBUMIN SERPL BCG-MCNC: 3.7 G/DL (ref 3.5–5.2)
ALBUMIN UR-MCNC: NEGATIVE MG/DL
ALP SERPL-CCNC: 77 U/L (ref 40–150)
ALT SERPL W P-5'-P-CCNC: 28 U/L (ref 0–70)
ANION GAP SERPL CALCULATED.3IONS-SCNC: 13 MMOL/L (ref 7–15)
APPEARANCE UR: CLEAR
AST SERPL W P-5'-P-CCNC: 32 U/L (ref 0–45)
BASE EXCESS BLDV CALC-SCNC: 0 MMOL/L (ref -3–3)
BASE EXCESS BLDV CALC-SCNC: 0 MMOL/L (ref -3–3)
BASOPHILS # BLD AUTO: 0.1 10E3/UL (ref 0–0.2)
BASOPHILS NFR BLD AUTO: 1 %
BILIRUB SERPL-MCNC: 0.2 MG/DL
BILIRUB UR QL STRIP: NEGATIVE
BUN SERPL-MCNC: 25.8 MG/DL (ref 8–23)
CALCIUM SERPL-MCNC: 8.5 MG/DL (ref 8.8–10.4)
CHLORIDE SERPL-SCNC: 104 MMOL/L (ref 98–107)
COLOR UR AUTO: YELLOW
CREAT SERPL-MCNC: 0.92 MG/DL (ref 0.67–1.17)
EGFRCR SERPLBLD CKD-EPI 2021: >90 ML/MIN/1.73M2
EOSINOPHIL # BLD AUTO: 0.4 10E3/UL (ref 0–0.7)
EOSINOPHIL NFR BLD AUTO: 5 %
ERYTHROCYTE [DISTWIDTH] IN BLOOD BY AUTOMATED COUNT: 15.1 % (ref 10–15)
FLUAV RNA SPEC QL NAA+PROBE: NEGATIVE
FLUBV RNA RESP QL NAA+PROBE: NEGATIVE
GLUCOSE SERPL-MCNC: 125 MG/DL (ref 70–99)
GLUCOSE UR STRIP-MCNC: NEGATIVE MG/DL
HCO3 BLDV-SCNC: 26 MMOL/L (ref 21–28)
HCO3 BLDV-SCNC: 26 MMOL/L (ref 21–28)
HCO3 SERPL-SCNC: 25 MMOL/L (ref 22–29)
HCT VFR BLD AUTO: 42.1 % (ref 40–53)
HGB BLD-MCNC: 13.4 G/DL (ref 13.3–17.7)
HGB UR QL STRIP: NEGATIVE
HOLD SPECIMEN: NORMAL
HOLD SPECIMEN: NORMAL
IMM GRANULOCYTES # BLD: 0 10E3/UL
IMM GRANULOCYTES NFR BLD: 0 %
KETONES UR STRIP-MCNC: NEGATIVE MG/DL
LACTATE BLD-SCNC: 1.3 MMOL/L
LACTATE BLD-SCNC: 2.4 MMOL/L
LACTATE SERPL-SCNC: 2.4 MMOL/L (ref 0.7–2)
LEUKOCYTE ESTERASE UR QL STRIP: NEGATIVE
LYMPHOCYTES # BLD AUTO: 2.1 10E3/UL (ref 0.8–5.3)
LYMPHOCYTES NFR BLD AUTO: 28 %
MCH RBC QN AUTO: 28.4 PG (ref 26.5–33)
MCHC RBC AUTO-ENTMCNC: 31.8 G/DL (ref 31.5–36.5)
MCV RBC AUTO: 89 FL (ref 78–100)
MONOCYTES # BLD AUTO: 0.6 10E3/UL (ref 0–1.3)
MONOCYTES NFR BLD AUTO: 8 %
NEUTROPHILS # BLD AUTO: 4.1 10E3/UL (ref 1.6–8.3)
NEUTROPHILS NFR BLD AUTO: 57 %
NITRATE UR QL: NEGATIVE
NRBC # BLD AUTO: 0 10E3/UL
NRBC BLD AUTO-RTO: 0 /100
PCO2 BLDV: 48 MM HG (ref 40–50)
PCO2 BLDV: 52 MM HG (ref 40–50)
PH BLDV: 7.31 [PH] (ref 7.32–7.43)
PH BLDV: 7.34 [PH] (ref 7.32–7.43)
PH UR STRIP: 7 [PH] (ref 5–7)
PLATELET # BLD AUTO: 208 10E3/UL (ref 150–450)
PO2 BLDV: 34 MM HG (ref 25–47)
PO2 BLDV: 56 MM HG (ref 25–47)
POTASSIUM SERPL-SCNC: 4.3 MMOL/L (ref 3.4–5.3)
PROCALCITONIN SERPL IA-MCNC: 0.08 NG/ML
PROT SERPL-MCNC: 7.2 G/DL (ref 6.4–8.3)
RBC # BLD AUTO: 4.72 10E6/UL (ref 4.4–5.9)
RBC URINE: <1 /HPF
RSV RNA SPEC NAA+PROBE: NEGATIVE
SAO2 % BLDV: 59 % (ref 70–75)
SAO2 % BLDV: 86 % (ref 70–75)
SARS-COV-2 RNA RESP QL NAA+PROBE: NEGATIVE
SODIUM SERPL-SCNC: 142 MMOL/L (ref 135–145)
SP GR UR STRIP: 1.03 (ref 1–1.03)
T4 FREE SERPL-MCNC: 0.57 NG/DL (ref 0.9–1.7)
TSH SERPL DL<=0.005 MIU/L-ACNC: <0.01 UIU/ML (ref 0.3–4.2)
UROBILINOGEN UR STRIP-MCNC: NORMAL MG/DL
WBC # BLD AUTO: 7.3 10E3/UL (ref 4–11)
WBC URINE: <1 /HPF

## 2024-11-29 PROCEDURE — 82803 BLOOD GASES ANY COMBINATION: CPT

## 2024-11-29 PROCEDURE — 85014 HEMATOCRIT: CPT | Performed by: PHYSICIAN ASSISTANT

## 2024-11-29 PROCEDURE — 83605 ASSAY OF LACTIC ACID: CPT

## 2024-11-29 PROCEDURE — 84145 PROCALCITONIN (PCT): CPT | Performed by: PHYSICIAN ASSISTANT

## 2024-11-29 PROCEDURE — 84439 ASSAY OF FREE THYROXINE: CPT | Performed by: PHYSICIAN ASSISTANT

## 2024-11-29 PROCEDURE — 83605 ASSAY OF LACTIC ACID: CPT | Performed by: PHYSICIAN ASSISTANT

## 2024-11-29 PROCEDURE — 84443 ASSAY THYROID STIM HORMONE: CPT | Performed by: PHYSICIAN ASSISTANT

## 2024-11-29 PROCEDURE — 87637 SARSCOV2&INF A&B&RSV AMP PRB: CPT | Performed by: PHYSICIAN ASSISTANT

## 2024-11-29 PROCEDURE — 96360 HYDRATION IV INFUSION INIT: CPT

## 2024-11-29 PROCEDURE — 87040 BLOOD CULTURE FOR BACTERIA: CPT | Performed by: PHYSICIAN ASSISTANT

## 2024-11-29 PROCEDURE — 80053 COMPREHEN METABOLIC PANEL: CPT | Performed by: PHYSICIAN ASSISTANT

## 2024-11-29 PROCEDURE — 96361 HYDRATE IV INFUSION ADD-ON: CPT

## 2024-11-29 PROCEDURE — 36415 COLL VENOUS BLD VENIPUNCTURE: CPT | Performed by: PHYSICIAN ASSISTANT

## 2024-11-29 PROCEDURE — 250N000009 HC RX 250: Performed by: PHYSICIAN ASSISTANT

## 2024-11-29 PROCEDURE — 250N000013 HC RX MED GY IP 250 OP 250 PS 637: Performed by: PHYSICIAN ASSISTANT

## 2024-11-29 PROCEDURE — 71046 X-RAY EXAM CHEST 2 VIEWS: CPT

## 2024-11-29 PROCEDURE — 81001 URINALYSIS AUTO W/SCOPE: CPT | Performed by: PHYSICIAN ASSISTANT

## 2024-11-29 PROCEDURE — 99284 EMERGENCY DEPT VISIT MOD MDM: CPT | Mod: 25

## 2024-11-29 PROCEDURE — 85004 AUTOMATED DIFF WBC COUNT: CPT | Performed by: PHYSICIAN ASSISTANT

## 2024-11-29 PROCEDURE — 258N000003 HC RX IP 258 OP 636: Performed by: PHYSICIAN ASSISTANT

## 2024-11-29 RX ORDER — ACETAMINOPHEN 325 MG/10.15ML
10 LIQUID ORAL ONCE
Status: COMPLETED | OUTPATIENT
Start: 2024-11-29 | End: 2024-11-29

## 2024-11-29 RX ORDER — LIDOCAINE HYDROCHLORIDE 20 MG/ML
10 JELLY TOPICAL ONCE
Status: COMPLETED | OUTPATIENT
Start: 2024-11-29 | End: 2024-11-29

## 2024-11-29 RX ADMIN — ACETAMINOPHEN 672 MG: 325 SUSPENSION ORAL at 18:38

## 2024-11-29 RX ADMIN — SODIUM CHLORIDE 1000 ML: 9 INJECTION, SOLUTION INTRAVENOUS at 21:30

## 2024-11-29 RX ADMIN — LIDOCAINE HYDROCHLORIDE 10 ML: 20 JELLY TOPICAL at 21:13

## 2024-11-29 RX ADMIN — SODIUM CHLORIDE 1000 ML: 9 INJECTION, SOLUTION INTRAVENOUS at 18:15

## 2024-11-29 ASSESSMENT — ACTIVITIES OF DAILY LIVING (ADL)
ADLS_ACUITY_SCORE: 63

## 2024-11-29 ASSESSMENT — COLUMBIA-SUICIDE SEVERITY RATING SCALE - C-SSRS: IS THE PATIENT NOT ABLE TO COMPLETE C-SSRS: UNABLE TO VERBALIZE

## 2024-11-29 NOTE — ED NOTES
Bed: ED26  Expected date:   Expected time:   Means of arrival:   Comments:  eKrri: 64M febrile, confused

## 2024-11-29 NOTE — ED TRIAGE NOTES
Pt BIBA from home after mother called 911 due to patient spiking temperature this morning. Patient otherwise stable, mother states that he has been having normal BM's, urinating and taking PO. No other issues other than fever.      Triage Assessment (Adult)       Row Name 11/29/24 7526          Respiratory WDL    Respiratory WDL WDL        Cardiac WDL    Cardiac WDL WDL        Cognitive/Neuro/Behavioral WDL    Cognitive/Neuro/Behavioral WDL X  at baseline

## 2024-11-30 VITALS
RESPIRATION RATE: 16 BRPM | OXYGEN SATURATION: 97 % | HEART RATE: 84 BPM | SYSTOLIC BLOOD PRESSURE: 131 MMHG | DIASTOLIC BLOOD PRESSURE: 73 MMHG | TEMPERATURE: 97.8 F

## 2024-11-30 ASSESSMENT — ACTIVITIES OF DAILY LIVING (ADL): ADLS_ACUITY_SCORE: 63
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
## 2024-11-30 NOTE — DISCHARGE INSTRUCTIONS
The exact cause of Keyon's fever is unclear at this time.  There is no evidence for pneumonia nor evidence for urinary tract infection.  Full skin exam is reassuring at this time.  Continue to monitor Keoyn's fever closely. Should he develop persistent cough or appear markedly more lethargic or if you have any other concerns, have a low threshold to return to the emergency department.

## 2024-12-01 ENCOUNTER — HOSPITAL ENCOUNTER (EMERGENCY)
Facility: CLINIC | Age: 64
Discharge: HOME | End: 2024-12-01
Attending: EMERGENCY MEDICINE | Admitting: EMERGENCY MEDICINE
Payer: MEDICARE

## 2024-12-01 ENCOUNTER — APPOINTMENT (OUTPATIENT)
Dept: GENERAL RADIOLOGY | Facility: CLINIC | Age: 64
End: 2024-12-01
Attending: EMERGENCY MEDICINE
Payer: MEDICARE

## 2024-12-01 ENCOUNTER — NURSE TRIAGE (OUTPATIENT)
Dept: NURSING | Facility: CLINIC | Age: 64
End: 2024-12-01
Payer: MEDICARE

## 2024-12-01 VITALS
OXYGEN SATURATION: 94 % | TEMPERATURE: 97.3 F | DIASTOLIC BLOOD PRESSURE: 81 MMHG | BODY MASS INDEX: 21.52 KG/M2 | WEIGHT: 150 LBS | HEART RATE: 74 BPM | RESPIRATION RATE: 14 BRPM | SYSTOLIC BLOOD PRESSURE: 126 MMHG

## 2024-12-01 DIAGNOSIS — R50.9 FEVER, UNSPECIFIED FEVER CAUSE: ICD-10-CM

## 2024-12-01 LAB
ANION GAP SERPL CALCULATED.3IONS-SCNC: 10 MMOL/L (ref 7–15)
BASOPHILS # BLD AUTO: 0.1 10E3/UL (ref 0–0.2)
BASOPHILS NFR BLD AUTO: 1 %
BUN SERPL-MCNC: 21.8 MG/DL (ref 8–23)
CALCIUM SERPL-MCNC: 8 MG/DL (ref 8.8–10.4)
CHLORIDE SERPL-SCNC: 103 MMOL/L (ref 98–107)
CREAT SERPL-MCNC: 0.86 MG/DL (ref 0.67–1.17)
EGFRCR SERPLBLD CKD-EPI 2021: >90 ML/MIN/1.73M2
EOSINOPHIL # BLD AUTO: 0.4 10E3/UL (ref 0–0.7)
EOSINOPHIL NFR BLD AUTO: 4 %
ERYTHROCYTE [DISTWIDTH] IN BLOOD BY AUTOMATED COUNT: 14.9 % (ref 10–15)
GLUCOSE SERPL-MCNC: 89 MG/DL (ref 70–99)
HCO3 SERPL-SCNC: 26 MMOL/L (ref 22–29)
HCT VFR BLD AUTO: 38.5 % (ref 40–53)
HGB BLD-MCNC: 12.2 G/DL (ref 13.3–17.7)
IMM GRANULOCYTES # BLD: 0 10E3/UL
IMM GRANULOCYTES NFR BLD: 1 %
LACTATE SERPL-SCNC: 1.3 MMOL/L (ref 0.7–2)
LYMPHOCYTES # BLD AUTO: 2.8 10E3/UL (ref 0.8–5.3)
LYMPHOCYTES NFR BLD AUTO: 33 %
MCH RBC QN AUTO: 27.9 PG (ref 26.5–33)
MCHC RBC AUTO-ENTMCNC: 31.7 G/DL (ref 31.5–36.5)
MCV RBC AUTO: 88 FL (ref 78–100)
MONOCYTES # BLD AUTO: 0.8 10E3/UL (ref 0–1.3)
MONOCYTES NFR BLD AUTO: 9 %
NEUTROPHILS # BLD AUTO: 4.5 10E3/UL (ref 1.6–8.3)
NEUTROPHILS NFR BLD AUTO: 52 %
NRBC # BLD AUTO: 0 10E3/UL
NRBC BLD AUTO-RTO: 0 /100
PLATELET # BLD AUTO: 175 10E3/UL (ref 150–450)
POTASSIUM SERPL-SCNC: 4 MMOL/L (ref 3.4–5.3)
RBC # BLD AUTO: 4.38 10E6/UL (ref 4.4–5.9)
SODIUM SERPL-SCNC: 139 MMOL/L (ref 135–145)
WBC # BLD AUTO: 8.6 10E3/UL (ref 4–11)

## 2024-12-01 PROCEDURE — 99284 EMERGENCY DEPT VISIT MOD MDM: CPT | Mod: 25

## 2024-12-01 PROCEDURE — 85004 AUTOMATED DIFF WBC COUNT: CPT | Performed by: EMERGENCY MEDICINE

## 2024-12-01 PROCEDURE — 83605 ASSAY OF LACTIC ACID: CPT | Performed by: EMERGENCY MEDICINE

## 2024-12-01 PROCEDURE — 80048 BASIC METABOLIC PNL TOTAL CA: CPT | Performed by: EMERGENCY MEDICINE

## 2024-12-01 PROCEDURE — 36415 COLL VENOUS BLD VENIPUNCTURE: CPT | Performed by: EMERGENCY MEDICINE

## 2024-12-01 PROCEDURE — 71046 X-RAY EXAM CHEST 2 VIEWS: CPT

## 2024-12-01 ASSESSMENT — ACTIVITIES OF DAILY LIVING (ADL)
ADLS_ACUITY_SCORE: 63

## 2024-12-01 ASSESSMENT — COLUMBIA-SUICIDE SEVERITY RATING SCALE - C-SSRS
6. HAVE YOU EVER DONE ANYTHING, STARTED TO DO ANYTHING, OR PREPARED TO DO ANYTHING TO END YOUR LIFE?: NO
IS THE PATIENT NOT ABLE TO COMPLETE C-SSRS: UNABLE TO VERBALIZE
1. IN THE PAST MONTH, HAVE YOU WISHED YOU WERE DEAD OR WISHED YOU COULD GO TO SLEEP AND NOT WAKE UP?: NO
2. HAVE YOU ACTUALLY HAD ANY THOUGHTS OF KILLING YOURSELF IN THE PAST MONTH?: NO

## 2024-12-01 NOTE — ED NOTES
Bed: ED23  Expected date:   Expected time:   Means of arrival:   Comments:  Kerri 1 64m quadriplegic, bedbound, fever

## 2024-12-01 NOTE — ED PROVIDER NOTES
Emergency Department Note      History of Present Illness     Chief Complaint   Fever      HPI   Keyon Farias is a 64 year old male presents with concern for fever.  Patient's mother had checked his temperature under his armpit and was concerned that he had a temperature of 100 F and sent him to the hospital.  There have not been any other symptoms.  Patient is unable to provide any history and is nonverbal at baseline.    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)     DVT of upper extremity (deep vein thrombosis) (H)     Gastro-oesophageal reflux disease     Panhypopituitarism (H)     Pneumonia     Seizures (H)     Sepsis due to urinary tract infection (H) 01/15/2021    Septic shock (H)     Spastic hemiplegia affecting dominant side (H)     Thyroid disease     Tracheostomy care (H)     Traumatic brain injury (H) 1989    Unspecified cerebral artery occlusion with cerebral infarction 1989    UTI (urinary tract infection)     Ventricular fibrillation (H)     Ventricular tachyarrhythmia (H)        Medications   acetaminophen (TYLENOL) 160 MG/5ML liquid  acetylcysteine (MUCOMYST) 20 % neb solution  albuterol (PROVENTIL) (5 MG/ML) 0.5% neb solution  amoxicillin-clavulanate (AUGMENTIN) 125-31.25 MG/5ML suspension  bacitracin 500 UNIT/GM external ointment  Brivaracetam (BRIVIACT) 10 MG/ML solution  carBAMazepine (TEGRETOL) 100 MG/5ML suspension  carBAMazepine (TEGRETOL) 100 MG/5ML suspension  COMPOUNDED NON-CONTROLLED SUBSTANCE (CMPD RX) - PHARMACY TO MIX COMPOUNDED MEDICATION  Cyanocobalamin (VITAMIN B-12 PO)  Dextromethorphan-guaiFENesin (MUCINEX FAST-MAX DM MAX) 5-100 MG/5ML LIQD  glycopyrrolate (CUVPOSA) 1 MG/5ML solution  hydrocortisone (CORTAID) 1 % external cream  hydrocortisone (CORTEF) 2  "mg/mL SUSP  insulin syringe-needle U-100 (29G X 1/2\" 1 ML) 29G X 1/2\" 1 ML miscellaneous  levothyroxine (SYNTHROID/LEVOTHROID) 88 MCG tablet  metoclopramide (REGLAN) 10 MG/10ML SOLN solution  miconazole (MICATIN) 2 % external powder  multivitamin (CENTRUM) liquid  mupirocin (BACTROBAN) 2 % external ointment  Nutritional Supplements (BOOST HIGH PROTEIN) LIQD  pantoprazole (PROTONIX) 2 mg/mL SUSP suspension  polyethylene glycol (MIRALAX) 17 GM/Dose powder  potassium & sodium phosphates (NEUTRA-PHOS) 280-160-250 MG Packet  sennosides (SENOKOT) 8.6 MG tablet  sodium chloride 1 GM tablet  testosterone cypionate (DEPOTESTOSTERONE) 200 MG/ML injection  vitamin C (ASCORBIC ACID) 1000 MG TABS  vitamin D3 (CHOLECALCIFEROL) 2000 units (50 mcg) tablet        Surgical History   Past Surgical History:   Procedure Laterality Date    ENDOSCOPIC ULTRASOUND UPPER GASTROINTESTINAL TRACT (GI) N/A 1/30/2017    Procedure: ENDOSCOPIC ULTRASOUND, ESOPHAGOSCOPY / UPPER GASTROINTESTINAL TRACT (GI);  Surgeon: Jus Montana MD;  Location: [facility]    ENDOSCOPIC ULTRASOUND, ESOPHAGOSCOPY, GASTROSCOPY, DUODENOSCOPY (EGD), NECROSECTOMY N/A 2/7/2017    Procedure: ENDOSCOPIC ULTRASOUND, ESOPHAGOSCOPY, GASTROSCOPY, DUODENOSCOPY (EGD), NECROSECTOMY;  Surgeon: Jack Marcus MD;  Location: [facility]    ESOPHAGOSCOPY, GASTROSCOPY, DUODENOSCOPY (EGD), COMBINED  3/13/2014    Procedure: COMBINED ESOPHAGOSCOPY, GASTROSCOPY, DUODENOSCOPY (EGD), BIOPSY SINGLE OR MULTIPLE;  gastroscopy;  Surgeon: Digna Rhodes MD;  Location:  GI    ESOPHAGOSCOPY, GASTROSCOPY, DUODENOSCOPY (EGD), COMBINED N/A 12/6/2016    Procedure: COMBINED ESOPHAGOSCOPY, GASTROSCOPY, DUODENOSCOPY (EGD);  Surgeon: Digna Rhodes MD;  [facility]: [facility]    ESOPHAGOSCOPY, GASTROSCOPY, DUODENOSCOPY (EGD), COMBINED N/A 2/7/2017    Procedure: COMBINED ENDOSCOPIC ULTRASOUND, ESOPHAGOSCOPY, GASTROSCOPY, DUODENOSCOPY (EGD), FINE NEEDLE ASPIRATE/BIOPSY;  Surgeon: " Too Thakur MD;  [facility]    ESOPHAGOSCOPY, GASTROSCOPY, DUODENOSCOPY (EGD), COMBINED N/A 7/3/2024    Procedure: Endoscopic ultrasound upper gastrointestinal tract (GI);  Surgeon: Digna Rhodes MD;  Location:  GI    HEAD & NECK SURGERY      reconstructive facial surgery following accident in 1989    IR FOLLOW UP VISIT INPATIENT  2/20/2019    IR GASTRO JEJUNOSTOMY TUBE CHANGE  12/20/2018    IR GASTRO JEJUNOSTOMY TUBE CHANGE  2/4/2019    IR GASTRO JEJUNOSTOMY TUBE CHANGE  3/8/2019    IR GASTRO JEJUNOSTOMY TUBE CHANGE  8/7/2019    IR GASTRO JEJUNOSTOMY TUBE CHANGE  1/13/2020    IR GASTRO JEJUNOSTOMY TUBE CHANGE  1/30/2020    IR GASTRO JEJUNOSTOMY TUBE CHANGE  6/24/2020    IR GASTRO JEJUNOSTOMY TUBE CHANGE  9/17/2020    IR GASTRO JEJUNOSTOMY TUBE CHANGE  10/14/2020    IR GASTRO JEJUNOSTOMY TUBE CHANGE  2/16/2021    IR GASTRO JEJUNOSTOMY TUBE CHANGE  5/6/2021    IR GASTRO JEJUNOSTOMY TUBE CHANGE  5/25/2021    IR GASTRO JEJUNOSTOMY TUBE CHANGE  7/26/2021    IR GASTRO JEJUNOSTOMY TUBE CHANGE  9/29/2021    IR GASTRO JEJUNOSTOMY TUBE CHANGE  11/16/2021    IR GASTRO JEJUNOSTOMY TUBE CHANGE  3/18/2022    IR GASTRO JEJUNOSTOMY TUBE CHANGE  6/8/2022    IR GASTRO JEJUNOSTOMY TUBE CHANGE  7/1/2022    IR GASTRO JEJUNOSTOMY TUBE CHANGE  11/25/2022    IR GASTRO JEJUNOSTOMY TUBE CHANGE  5/1/2023    IR GASTRO JEJUNOSTOMY TUBE CHANGE  8/10/2023    IR GASTRO JEJUNOSTOMY TUBE CHANGE  9/21/2023    IR GASTRO JEJUNOSTOMY TUBE CHANGE  11/7/2023    IR GASTRO JEJUNOSTOMY TUBE CHANGE  5/1/2024    IR GASTRO JEJUNOSTOMY TUBE CHANGE  8/5/2024    IR GASTRO JEJUNOSTOMY TUBE CHANGE  10/23/2024    IR PICC EXCHANGE LEFT  8/15/2019    LAPAROSCOPIC APPENDECTOMY  7/30/2013    Procedure: LAPAROSCOPIC APPENDECTOMY;  LAPAROSCOPIC APPENDECTOMY;  Surgeon: Manish Pierce MD;  Location:  OR    LAPAROSCOPIC ASSISTED INSERTION TUBE GASTROTOMY N/A 9/7/2016    Procedure: LAPAROSCOPIC ASSISTED INSERTION TUBE GASTROSTOMY;  Surgeon:  Manish Pierce MD;  Location:  OR    ORTHOPEDIC SURGERY      right hand repair    TRACHEOSTOMY N/A 9/3/2016    Procedure: TRACHEOSTOMY;  Surgeon: João Ortiz MD;  Location:  OR    TRACHEOSTOMY N/A 12/2/2016    Procedure: TRACHEOSTOMY;  Surgeon: João Ortiz MD;  Location:  OR    VASCULAR SURGERY         Physical Exam     Patient Vitals for the past 24 hrs:   BP Temp Temp src Pulse Resp SpO2 Weight   12/01/24 0400 -- -- -- 81 14 92 % --   12/01/24 0345 -- -- -- 80 10 92 % --   12/01/24 0330 -- -- -- 84 12 93 % --   12/01/24 0315 -- -- -- 82 14 91 % --   12/01/24 0245 -- -- -- 77 19 93 % --   12/01/24 0200 -- -- -- 81 18 95 % --   12/01/24 0131 -- 99.4  F (37.4  C) Rectal -- -- -- --   12/01/24 0130 133/78 -- -- 87 14 95 % --   12/01/24 0127 133/78 98.8  F (37.1  C) Temporal 89 24 96 % 68 kg (150 lb)     Physical Exam  General: Does not appear in acute distress  Head: No signs of trauma.   Mouth/Throat: Oropharynx is clear and dry  Eyes: Conjunctivae are normal.   Neck: Normal range of motion. No nuchal rigidity.   CV: Normal rate and regular rhythm.    Resp: Effort normal and breath sounds normal. No respiratory distress.   GI: Soft. There is no tenderness.  No rebound or guarding.  Normal bowel sounds.  G-tube present  MSK: no edema.   Skin: Skin is warm and dry. No rash noted.       Diagnostics     Lab Results   Labs Ordered and Resulted from Time of ED Arrival to Time of ED Departure   BASIC METABOLIC PANEL - Abnormal       Result Value    Sodium 139      Potassium 4.0      Chloride 103      Carbon Dioxide (CO2) 26      Anion Gap 10      Urea Nitrogen 21.8      Creatinine 0.86      GFR Estimate >90      Calcium 8.0 (*)     Glucose 89     CBC WITH PLATELETS AND DIFFERENTIAL - Abnormal    WBC Count 8.6      RBC Count 4.38 (*)     Hemoglobin 12.2 (*)     Hematocrit 38.5 (*)     MCV 88      MCH 27.9      MCHC 31.7      RDW 14.9      Platelet Count 175      % Neutrophils 52      %  Lymphocytes 33      % Monocytes 9      % Eosinophils 4      % Basophils 1      % Immature Granulocytes 1      NRBCs per 100 WBC 0      Absolute Neutrophils 4.5      Absolute Lymphocytes 2.8      Absolute Monocytes 0.8      Absolute Eosinophils 0.4      Absolute Basophils 0.1      Absolute Immature Granulocytes 0.0      Absolute NRBCs 0.0     LACTIC ACID WHOLE BLOOD WITH 1X REPEAT IN 2 HR WHEN >2 - Normal    Lactic Acid, Initial 1.3         Imaging   XR Chest 2 Views   Final Result   IMPRESSION: Elevation of the right hemidiaphragm. No acute cardiopulmonary process. Stable cardiomediastinal silhouette.            Independent Interpretation   On my independent interpretation of CXR there is no pneumothorax      ED Course      Medications Administered   Medications - No data to display    Procedures   Procedures       ED Course   ED Course as of 12/01/24 0658   Sun Dec 01, 2024   0539 Spoke with patient's mother and discussed the test results and plan.       Additional Documentation  Social Determinants of Health: Transportationvery limited mobility    Medical Decision Making / Diagnosis       ARSENIO Farias is a 64 year old male presents due to a concern for a fever.  Patient has a history of a TBI and aphasia and is unable to provide history.  Patient's mother had checked his temperature under his armpit and noted it to be mildly elevated and sent him to the hospital.  He has not been having any other symptoms.  He has been evaluated for similar complaint on multiple occasions in the past with the last time being 11/29/2024.  His vitals were appropriate in the ER with a normal rectal temperature.  I did obtain blood work which was reassuring with a normal white blood cell count and normal lactic acid.  I did speak with the patient's mother who requested a chest x-ray and this was obtained and did not show any signs of pneumonia and was stable.  Patient's physical exam was overall reassuring.  At this time I do  not see any emergent concerns.  I did speak with the patient's mother and discussed supportive care measures and discussed checking his temperature rectally in order to get an accurate assessment.  I did stressed the importance of follow-up in clinic and return precautions were discussed    Disposition   The patient was discharged.     Diagnosis     ICD-10-CM    1. Fever, unspecified fever cause  R50.9            Discharge Medications   New Prescriptions    No medications on file         MD Deonna Gregg John A, MD  12/02/24 0354

## 2024-12-01 NOTE — DISCHARGE INSTRUCTIONS
Keyon's evaluation tonight is reassuring.  As we discussed, I would recommend checking his temperature rectally if you have concerns.  Can continue with Tylenol plenty of fluids and have him follow-up in clinic.  Please have him return for any further emergent concerns.

## 2024-12-01 NOTE — TELEPHONE ENCOUNTER
Patient's mother hand guardian calling. Patient had a fever yesterday of 101.9 axillary. He was seen in the emergency department at [facility]. Labs were normal. Today he's continued to be feverish. He received 1000 mg of acetaminophen 2 hour ago. Temperature is now 99.9. Mom states no other symptoms, other than when she listens with her ear, she hears a sound in his chest. Sats 93% on room air. , patient tends to run a higher heart rate.     Care advice given for patient to be seen within 4 hours. Second-level triage not initiated d/t mom's anxiety regarding patient's clinical condition.     Corina Hernández RN  [facility]  December 1, 2024, 12:54 AM    Reason for Disposition   [1] Fever > 100.0 F (37.8 C) AND [2] diabetes mellitus or weak immune system (e.g., HIV positive, cancer chemo, splenectomy, organ transplant, chronic steroids)    Additional Information   Negative: Shock suspected (e.g., cold/pale/clammy skin, too weak to stand, low BP, rapid pulse)   Negative: Difficult to awaken or acting confused (e.g., disoriented, slurred speech)   Negative: [1] Difficulty breathing AND [2] bluish lips, tongue or face   Negative: New-onset rash with multiple purple (or blood-colored) spots or dots   Negative: Sounds like a life-threatening emergency to the triager   Negative: Fever in a cancer patient who is currently (or recently) receiving chemotherapy or radiation therapy, or cancer patient who has metastatic or end-stage cancer and is receiving palliative care   Negative: Pregnant   Negative: Postpartum (from 0 to 6 weeks after delivery)   Negative: Fever onset within 24 hours of receiving vaccine   Negative: [1] Fever AND [2] within 14 days of COVID-19 Exposure   Negative: Other symptom is present, see that guideline (e.g., symptoms of cough, runny nose, sore throat, earache, abdominal pain, diarrhea, vomiting)   Negative: [1] Headache AND [2] stiff neck (can't touch chin to chest)    Negative: Difficulty breathing   Negative: IV Drug Use (IVDU)   Negative: [1] Drinking very little AND [2] dehydration suspected (e.g., no urine > 12 hours, very dry mouth, very lightheaded)   Negative: Widespread rash and cause unknown   Negative: Patient sounds very sick or weak to the triager  (Exception: Mild weakness and hasn't taken fever medicine.)   Negative: Fever > 104 F (40 C)   Negative: [1] Fever > 101 F (38.3 C) AND [2] age > 60 years   Negative: [1] Fever > 100.0 F (37.8 C) AND [2] bedridden (e.g., CVA, chronic illness, recovering from surgery)   Negative: [1] Fever > 100.0 F (37.8 C) AND [2] indwelling urinary catheter (e.g., Lerma, Coude)   Negative: [1] Fever > 100.0 F (37.8 C) AND [2] has port (portacath), central line, or PICC line    Protocols used: Fever-A-AH

## 2024-12-03 ENCOUNTER — PATIENT OUTREACH (OUTPATIENT)
Dept: CARE COORDINATION | Facility: CLINIC | Age: 64
End: 2024-12-03
Payer: MEDICARE

## 2024-12-03 NOTE — PROGRESS NOTES
[facility] Care Resource Center Contact  [facility]/Voicemail     Clinical Data: Care Coordination ED-sourced Outreach-     Outreach attempted x 2.  Left message on patient's voicemail, providing [facility]'s 24/7 scheduling and nurse triage phone number [phone] ([phone]) for questions/concerns and/or to schedule an appt with an [facility] provider.      [facility] unable to send Care Coordination introduction letter as patient does not have an active MyChart account. Clinic Care Coordination services remain available via referral if needed.    Plan: [facility] will do no further outreaches at this time.       WALLY Mistry  [facility] Resource [place], [facility]    *Connected Care Resource Team does NOT follow patient ongoing. Referrals are identified based on internal discharge reports and the outreach is to ensure patient has an understanding of their discharge instructions.

## 2024-12-04 ENCOUNTER — PATIENT OUTREACH (OUTPATIENT)
Dept: CARE COORDINATION | Facility: CLINIC | Age: 64
End: 2024-12-04
Payer: MEDICARE

## 2024-12-04 LAB — BACTERIA BLD CULT: NO GROWTH

## 2024-12-04 NOTE — PROGRESS NOTES
[facility]  Medicare ACO Reach Population - Proactive Outreach  Unable to Reach    Background: Patient outreach conducted proactively to support health maintenance initiatives within [facility]'s Medicare ACO Reach Population.     Outreach attempted x 1.  Left message on patient's voicemail briefly explaining this is a proactive outreach from [facility].. Patient encouraged to call the [facility] scheduling team at [phone] with any scheduling needs or questions, or they can conveniently schedule via 5 CUPS and some sugar.    Plan:  Care Coordinator will try to reach patient again in 1-2 business days.    Gracie Srinivasan RN  [facility]

## 2024-12-06 ENCOUNTER — APPOINTMENT (OUTPATIENT)
Dept: CT IMAGING | Facility: CLINIC | Age: 64
DRG: 871 | End: 2024-12-06
Attending: EMERGENCY MEDICINE
Payer: MEDICARE

## 2024-12-06 ENCOUNTER — APPOINTMENT (OUTPATIENT)
Dept: GENERAL RADIOLOGY | Facility: CLINIC | Age: 64
End: 2024-12-06
Attending: EMERGENCY MEDICINE
Payer: MEDICARE

## 2024-12-06 ENCOUNTER — HOSPITAL ENCOUNTER (INPATIENT)
Facility: CLINIC | Age: 64
DRG: 871 | End: 2024-12-06
Attending: EMERGENCY MEDICINE | Admitting: INTERNAL MEDICINE
Payer: MEDICARE

## 2024-12-06 DIAGNOSIS — K52.89 STERCORAL COLITIS: ICD-10-CM

## 2024-12-06 DIAGNOSIS — J69.0 ASPIRATION PNEUMONIA OF BOTH LUNGS, UNSPECIFIED ASPIRATION PNEUMONIA TYPE, UNSPECIFIED PART OF LUNG (H): ICD-10-CM

## 2024-12-06 DIAGNOSIS — J69.0 ASPIRATION PNEUMONIA OF RIGHT LUNG, UNSPECIFIED ASPIRATION PNEUMONIA TYPE, UNSPECIFIED PART OF LUNG (H): ICD-10-CM

## 2024-12-06 DIAGNOSIS — A41.9 SEPSIS, DUE TO UNSPECIFIED ORGANISM, UNSPECIFIED WHETHER ACUTE ORGAN DYSFUNCTION PRESENT (H): Primary | ICD-10-CM

## 2024-12-06 DIAGNOSIS — L25.8 INCONTINENCE ASSOCIATED DERMATITIS: ICD-10-CM

## 2024-12-06 DIAGNOSIS — G81.01 FLACCID HEMIPLEGIA AFFECTING RIGHT DOMINANT SIDE, UNSPECIFIED ETIOLOGY (H): ICD-10-CM

## 2024-12-06 DIAGNOSIS — J69.0 ASPIRATION PNEUMONIA OF RIGHT MIDDLE LOBE, UNSPECIFIED ASPIRATION PNEUMONIA TYPE (H): ICD-10-CM

## 2024-12-06 DIAGNOSIS — J18.9 PNEUMONIA OF BOTH LOWER LOBES DUE TO INFECTIOUS ORGANISM: ICD-10-CM

## 2024-12-06 DIAGNOSIS — E87.1 HYPONATREMIA: ICD-10-CM

## 2024-12-06 DIAGNOSIS — R32 INCONTINENCE ASSOCIATED DERMATITIS: ICD-10-CM

## 2024-12-06 DIAGNOSIS — J69.0 ASPIRATION PNEUMONITIS (H): ICD-10-CM

## 2024-12-06 LAB
ALBUMIN SERPL BCG-MCNC: 3.6 G/DL (ref 3.5–5.2)
ALBUMIN UR-MCNC: 20 MG/DL
ALP SERPL-CCNC: 82 U/L (ref 40–150)
ALT SERPL W P-5'-P-CCNC: 40 U/L (ref 0–70)
ANION GAP SERPL CALCULATED.3IONS-SCNC: 13 MMOL/L (ref 7–15)
APPEARANCE UR: CLEAR
AST SERPL W P-5'-P-CCNC: 62 U/L (ref 0–45)
BASE EXCESS BLDV CALC-SCNC: 0 MMOL/L (ref -3–3)
BASOPHILS # BLD AUTO: 0.1 10E3/UL (ref 0–0.2)
BASOPHILS NFR BLD AUTO: 1 %
BILIRUB SERPL-MCNC: 0.2 MG/DL
BILIRUB UR QL STRIP: NEGATIVE
BUN SERPL-MCNC: 25.2 MG/DL (ref 8–23)
CALCIUM SERPL-MCNC: 8.4 MG/DL (ref 8.8–10.4)
CHLORIDE SERPL-SCNC: 104 MMOL/L (ref 98–107)
COLOR UR AUTO: YELLOW
CREAT SERPL-MCNC: 0.97 MG/DL (ref 0.67–1.17)
EGFRCR SERPLBLD CKD-EPI 2021: 88 ML/MIN/1.73M2
EOSINOPHIL # BLD AUTO: 0 10E3/UL (ref 0–0.7)
EOSINOPHIL NFR BLD AUTO: 0 %
ERYTHROCYTE [DISTWIDTH] IN BLOOD BY AUTOMATED COUNT: 15.7 % (ref 10–15)
FLUAV RNA SPEC QL NAA+PROBE: NEGATIVE
FLUBV RNA RESP QL NAA+PROBE: NEGATIVE
GLUCOSE SERPL-MCNC: 174 MG/DL (ref 70–99)
GLUCOSE UR STRIP-MCNC: NEGATIVE MG/DL
HCO3 BLDV-SCNC: 25 MMOL/L (ref 21–28)
HCO3 SERPL-SCNC: 24 MMOL/L (ref 22–29)
HCT VFR BLD AUTO: 44.3 % (ref 40–53)
HGB BLD-MCNC: 14.2 G/DL (ref 13.3–17.7)
HGB UR QL STRIP: NEGATIVE
HOLD SPECIMEN: NORMAL
IMM GRANULOCYTES # BLD: 0.1 10E3/UL
IMM GRANULOCYTES NFR BLD: 0 %
KETONES UR STRIP-MCNC: NEGATIVE MG/DL
LACTATE BLD-SCNC: 3.4 MMOL/L
LEUKOCYTE ESTERASE UR QL STRIP: NEGATIVE
LYMPHOCYTES # BLD AUTO: 1.3 10E3/UL (ref 0.8–5.3)
LYMPHOCYTES NFR BLD AUTO: 6 %
MCH RBC QN AUTO: 28.3 PG (ref 26.5–33)
MCHC RBC AUTO-ENTMCNC: 32.1 G/DL (ref 31.5–36.5)
MCV RBC AUTO: 88 FL (ref 78–100)
MONOCYTES # BLD AUTO: 1.4 10E3/UL (ref 0–1.3)
MONOCYTES NFR BLD AUTO: 7 %
NEUTROPHILS # BLD AUTO: 17.6 10E3/UL (ref 1.6–8.3)
NEUTROPHILS NFR BLD AUTO: 86 %
NITRATE UR QL: NEGATIVE
NRBC # BLD AUTO: 0 10E3/UL
NRBC BLD AUTO-RTO: 0 /100
PCO2 BLDV: 40 MM HG (ref 40–50)
PH BLDV: 7.4 [PH] (ref 7.32–7.43)
PH UR STRIP: 7.5 [PH] (ref 5–7)
PLATELET # BLD AUTO: 208 10E3/UL (ref 150–450)
PO2 BLDV: 50 MM HG (ref 25–47)
POTASSIUM SERPL-SCNC: 4.2 MMOL/L (ref 3.4–5.3)
PROT SERPL-MCNC: 7.4 G/DL (ref 6.4–8.3)
RBC # BLD AUTO: 5.02 10E6/UL (ref 4.4–5.9)
RBC URINE: 1 /HPF
RSV RNA SPEC NAA+PROBE: NEGATIVE
SAO2 % BLDV: 85 % (ref 70–75)
SARS-COV-2 RNA RESP QL NAA+PROBE: NEGATIVE
SODIUM SERPL-SCNC: 141 MMOL/L (ref 135–145)
SP GR UR STRIP: 1.03 (ref 1–1.03)
UROBILINOGEN UR STRIP-MCNC: 4 MG/DL
WBC # BLD AUTO: 20.5 10E3/UL (ref 4–11)
WBC URINE: 1 /HPF

## 2024-12-06 PROCEDURE — 81003 URINALYSIS AUTO W/O SCOPE: CPT | Performed by: EMERGENCY MEDICINE

## 2024-12-06 PROCEDURE — 81001 URINALYSIS AUTO W/SCOPE: CPT | Performed by: EMERGENCY MEDICINE

## 2024-12-06 PROCEDURE — 250N000013 HC RX MED GY IP 250 OP 250 PS 637

## 2024-12-06 PROCEDURE — 87040 BLOOD CULTURE FOR BACTERIA: CPT | Performed by: EMERGENCY MEDICINE

## 2024-12-06 PROCEDURE — 84155 ASSAY OF PROTEIN SERUM: CPT | Performed by: EMERGENCY MEDICINE

## 2024-12-06 PROCEDURE — 36415 COLL VENOUS BLD VENIPUNCTURE: CPT | Performed by: EMERGENCY MEDICINE

## 2024-12-06 PROCEDURE — 250N000011 HC RX IP 250 OP 636: Performed by: EMERGENCY MEDICINE

## 2024-12-06 PROCEDURE — 96361 HYDRATE IV INFUSION ADD-ON: CPT

## 2024-12-06 PROCEDURE — 82435 ASSAY OF BLOOD CHLORIDE: CPT | Performed by: EMERGENCY MEDICINE

## 2024-12-06 PROCEDURE — 82803 BLOOD GASES ANY COMBINATION: CPT

## 2024-12-06 PROCEDURE — 250N000009 HC RX 250: Performed by: EMERGENCY MEDICINE

## 2024-12-06 PROCEDURE — G1010 CDSM STANSON: HCPCS

## 2024-12-06 PROCEDURE — 93005 ELECTROCARDIOGRAM TRACING: CPT

## 2024-12-06 PROCEDURE — 258N000003 HC RX IP 258 OP 636: Performed by: EMERGENCY MEDICINE

## 2024-12-06 PROCEDURE — 96374 THER/PROPH/DIAG INJ IV PUSH: CPT | Mod: 59

## 2024-12-06 PROCEDURE — 85025 COMPLETE CBC W/AUTO DIFF WBC: CPT | Performed by: EMERGENCY MEDICINE

## 2024-12-06 PROCEDURE — 250N000013 HC RX MED GY IP 250 OP 250 PS 637: Performed by: EMERGENCY MEDICINE

## 2024-12-06 PROCEDURE — 71046 X-RAY EXAM CHEST 2 VIEWS: CPT

## 2024-12-06 PROCEDURE — 87637 SARSCOV2&INF A&B&RSV AMP PRB: CPT | Performed by: EMERGENCY MEDICINE

## 2024-12-06 PROCEDURE — 74177 CT ABD & PELVIS W/CONTRAST: CPT | Mod: MG

## 2024-12-06 PROCEDURE — 99285 EMERGENCY DEPT VISIT HI MDM: CPT | Mod: 25

## 2024-12-06 PROCEDURE — 51798 US URINE CAPACITY MEASURE: CPT

## 2024-12-06 RX ORDER — GLYCOPYRROLATE 1 MG/5ML
2 SOLUTION ORAL ONCE
Status: DISCONTINUED | OUTPATIENT
Start: 2024-12-06 | End: 2024-12-06

## 2024-12-06 RX ORDER — IOPAMIDOL 755 MG/ML
75 INJECTION, SOLUTION INTRAVASCULAR ONCE
Status: COMPLETED | OUTPATIENT
Start: 2024-12-06 | End: 2024-12-06

## 2024-12-06 RX ORDER — MULTIVITAMIN,THERAPEUTIC
1 TABLET ORAL DAILY
COMMUNITY

## 2024-12-06 RX ORDER — ACETAMINOPHEN 325 MG/1
975 TABLET ORAL ONCE
Status: DISCONTINUED | OUTPATIENT
Start: 2024-12-06 | End: 2024-12-06 | Stop reason: DRUGHIGH

## 2024-12-06 RX ORDER — ACETAMINOPHEN 500 MG
500-1000 TABLET ORAL EVERY 6 HOURS PRN
COMMUNITY

## 2024-12-06 RX ORDER — CARBAMAZEPINE 100 MG/5ML
150 SUSPENSION ORAL ONCE
Status: COMPLETED | OUTPATIENT
Start: 2024-12-07 | End: 2024-12-06

## 2024-12-06 RX ORDER — GLYCOPYRROLATE 1 MG/1
2 TABLET ORAL ONCE
Status: COMPLETED | OUTPATIENT
Start: 2024-12-06 | End: 2024-12-06

## 2024-12-06 RX ORDER — ACETAMINOPHEN 650 MG/1
650 SUPPOSITORY RECTAL ONCE
Status: COMPLETED | OUTPATIENT
Start: 2024-12-06 | End: 2024-12-06

## 2024-12-06 RX ORDER — PIPERACILLIN SODIUM, TAZOBACTAM SODIUM 4; .5 G/20ML; G/20ML
4.5 INJECTION, POWDER, LYOPHILIZED, FOR SOLUTION INTRAVENOUS ONCE
Status: COMPLETED | OUTPATIENT
Start: 2024-12-06 | End: 2024-12-06

## 2024-12-06 RX ADMIN — CARBAMAZEPINE 150 MG: 100 SUSPENSION ORAL at 23:49

## 2024-12-06 RX ADMIN — PIPERACILLIN AND TAZOBACTAM 4.5 G: 4; .5 INJECTION, POWDER, FOR SOLUTION INTRAVENOUS at 21:39

## 2024-12-06 RX ADMIN — SODIUM CHLORIDE 63 ML: 9 INJECTION, SOLUTION INTRAVENOUS at 22:49

## 2024-12-06 RX ADMIN — GLYCOPYRROLATE 2 MG: 1 TABLET ORAL at 23:49

## 2024-12-06 RX ADMIN — ACETAMINOPHEN 650 MG: 650 SUPPOSITORY RECTAL at 20:40

## 2024-12-06 RX ADMIN — SODIUM CHLORIDE, POTASSIUM CHLORIDE, SODIUM LACTATE AND CALCIUM CHLORIDE 1000 ML: 600; 310; 30; 20 INJECTION, SOLUTION INTRAVENOUS at 20:15

## 2024-12-06 RX ADMIN — IOPAMIDOL 75 ML: 755 INJECTION, SOLUTION INTRAVENOUS at 22:49

## 2024-12-06 RX ADMIN — BRIVARACETAM 100 MG: 10 SOLUTION ORAL at 23:49

## 2024-12-06 ASSESSMENT — ACTIVITIES OF DAILY LIVING (ADL)
ADLS_ACUITY_SCORE: 63

## 2024-12-06 ASSESSMENT — COLUMBIA-SUICIDE SEVERITY RATING SCALE - C-SSRS: IS THE PATIENT NOT ABLE TO COMPLETE C-SSRS: UNABLE TO VERBALIZE

## 2024-12-07 PROBLEM — K52.89 STERCORAL COLITIS: Status: ACTIVE | Noted: 2024-12-07

## 2024-12-07 PROBLEM — U07.1 COVID-19 VIRUS INFECTION: Status: RESOLVED | Noted: 2023-10-25 | Resolved: 2024-12-07

## 2024-12-07 PROBLEM — U07.1 INFECTION DUE TO 2019 NOVEL CORONAVIRUS: Status: RESOLVED | Noted: 2022-06-17 | Resolved: 2024-12-07

## 2024-12-07 LAB
ANION GAP SERPL CALCULATED.3IONS-SCNC: 12 MMOL/L (ref 7–15)
BASE EXCESS BLDV CALC-SCNC: 1 MMOL/L (ref -3–3)
BASE EXCESS BLDV CALC-SCNC: 2.9 MMOL/L (ref -3–3)
BUN SERPL-MCNC: 20.9 MG/DL (ref 8–23)
CALCIUM SERPL-MCNC: 8 MG/DL (ref 8.8–10.4)
CHLORIDE SERPL-SCNC: 104 MMOL/L (ref 98–107)
CREAT SERPL-MCNC: 1.05 MG/DL (ref 0.67–1.17)
EGFRCR SERPLBLD CKD-EPI 2021: 80 ML/MIN/1.73M2
ERYTHROCYTE [DISTWIDTH] IN BLOOD BY AUTOMATED COUNT: 15.9 % (ref 10–15)
EST. AVERAGE GLUCOSE BLD GHB EST-MCNC: 114 MG/DL
GLUCOSE BLDC GLUCOMTR-MCNC: 110 MG/DL (ref 70–99)
GLUCOSE BLDC GLUCOMTR-MCNC: 133 MG/DL (ref 70–99)
GLUCOSE BLDC GLUCOMTR-MCNC: 143 MG/DL (ref 70–99)
GLUCOSE BLDC GLUCOMTR-MCNC: 145 MG/DL (ref 70–99)
GLUCOSE BLDC GLUCOMTR-MCNC: 164 MG/DL (ref 70–99)
GLUCOSE SERPL-MCNC: 106 MG/DL (ref 70–99)
HBA1C MFR BLD: 5.6 %
HCO3 BLDV-SCNC: 26 MMOL/L (ref 21–28)
HCO3 BLDV-SCNC: 29 MMOL/L (ref 21–28)
HCO3 SERPL-SCNC: 26 MMOL/L (ref 22–29)
HCT VFR BLD AUTO: 40.9 % (ref 40–53)
HGB BLD-MCNC: 13.2 G/DL (ref 13.3–17.7)
HOLD SPECIMEN: NORMAL
LACTATE BLD-SCNC: 3.7 MMOL/L
LACTATE SERPL-SCNC: 2.9 MMOL/L (ref 0.7–2)
LACTATE SERPL-SCNC: 3.5 MMOL/L (ref 0.7–2)
LACTATE SERPL-SCNC: 3.6 MMOL/L (ref 0.7–2)
MAGNESIUM SERPL-MCNC: 2 MG/DL (ref 1.7–2.3)
MCH RBC QN AUTO: 28.6 PG (ref 26.5–33)
MCHC RBC AUTO-ENTMCNC: 32.3 G/DL (ref 31.5–36.5)
MCV RBC AUTO: 89 FL (ref 78–100)
O2/TOTAL GAS SETTING VFR VENT: 100 %
OXYHGB MFR BLDV: 81 % (ref 70–75)
PCO2 BLDV: 39 MM HG (ref 40–50)
PCO2 BLDV: 49 MM HG (ref 40–50)
PH BLDV: 7.37 [PH] (ref 7.32–7.43)
PH BLDV: 7.43 [PH] (ref 7.32–7.43)
PHOSPHATE SERPL-MCNC: 3.3 MG/DL (ref 2.5–4.5)
PLATELET # BLD AUTO: 155 10E3/UL (ref 150–450)
PO2 BLDV: 49 MM HG (ref 25–47)
PO2 BLDV: 50 MM HG (ref 25–47)
POTASSIUM SERPL-SCNC: 4 MMOL/L (ref 3.4–5.3)
RBC # BLD AUTO: 4.61 10E6/UL (ref 4.4–5.9)
SAO2 % BLDV: 82.8 % (ref 70–75)
SAO2 % BLDV: 85 % (ref 70–75)
SODIUM SERPL-SCNC: 142 MMOL/L (ref 135–145)
WBC # BLD AUTO: 18.9 10E3/UL (ref 4–11)

## 2024-12-07 PROCEDURE — 85041 AUTOMATED RBC COUNT: CPT | Performed by: INTERNAL MEDICINE

## 2024-12-07 PROCEDURE — 84100 ASSAY OF PHOSPHORUS: CPT

## 2024-12-07 PROCEDURE — 250N000009 HC RX 250: Performed by: INTERNAL MEDICINE

## 2024-12-07 PROCEDURE — 94640 AIRWAY INHALATION TREATMENT: CPT

## 2024-12-07 PROCEDURE — 999N000127 HC STATISTIC PERIPHERAL IV START W US GUIDANCE

## 2024-12-07 PROCEDURE — 83036 HEMOGLOBIN GLYCOSYLATED A1C: CPT | Performed by: INTERNAL MEDICINE

## 2024-12-07 PROCEDURE — 94640 AIRWAY INHALATION TREATMENT: CPT | Mod: 76

## 2024-12-07 PROCEDURE — 99223 1ST HOSP IP/OBS HIGH 75: CPT | Mod: AI | Performed by: INTERNAL MEDICINE

## 2024-12-07 PROCEDURE — 250N000011 HC RX IP 250 OP 636: Performed by: INTERNAL MEDICINE

## 2024-12-07 PROCEDURE — 99291 CRITICAL CARE FIRST HOUR: CPT | Performed by: INTERNAL MEDICINE

## 2024-12-07 PROCEDURE — 93005 ELECTROCARDIOGRAM TRACING: CPT

## 2024-12-07 PROCEDURE — 999N000040 HC STATISTIC CONSULT NO CHARGE VASC ACCESS

## 2024-12-07 PROCEDURE — 82803 BLOOD GASES ANY COMBINATION: CPT

## 2024-12-07 PROCEDURE — 250N000012 HC RX MED GY IP 250 OP 636 PS 637: Performed by: INTERNAL MEDICINE

## 2024-12-07 PROCEDURE — 36415 COLL VENOUS BLD VENIPUNCTURE: CPT | Performed by: PHYSICIAN ASSISTANT

## 2024-12-07 PROCEDURE — 258N000003 HC RX IP 258 OP 636: Performed by: INTERNAL MEDICINE

## 2024-12-07 PROCEDURE — 85018 HEMOGLOBIN: CPT | Performed by: INTERNAL MEDICINE

## 2024-12-07 PROCEDURE — 93010 ELECTROCARDIOGRAM REPORT: CPT | Performed by: INTERNAL MEDICINE

## 2024-12-07 PROCEDURE — 83735 ASSAY OF MAGNESIUM: CPT | Performed by: INTERNAL MEDICINE

## 2024-12-07 PROCEDURE — 36415 COLL VENOUS BLD VENIPUNCTURE: CPT | Performed by: INTERNAL MEDICINE

## 2024-12-07 PROCEDURE — 999N000157 HC STATISTIC RCP TIME EA 10 MIN

## 2024-12-07 PROCEDURE — 99207 PR APP CREDIT; MD BILLING SHARED VISIT: CPT | Performed by: PHYSICIAN ASSISTANT

## 2024-12-07 PROCEDURE — 3E033XZ INTRODUCTION OF VASOPRESSOR INTO PERIPHERAL VEIN, PERCUTANEOUS APPROACH: ICD-10-PCS | Performed by: INTERNAL MEDICINE

## 2024-12-07 PROCEDURE — 258N000003 HC RX IP 258 OP 636: Performed by: PHYSICIAN ASSISTANT

## 2024-12-07 PROCEDURE — 83605 ASSAY OF LACTIC ACID: CPT | Performed by: INTERNAL MEDICINE

## 2024-12-07 PROCEDURE — 82805 BLOOD GASES W/O2 SATURATION: CPT | Performed by: PHYSICIAN ASSISTANT

## 2024-12-07 PROCEDURE — 258N000003 HC RX IP 258 OP 636: Performed by: EMERGENCY MEDICINE

## 2024-12-07 PROCEDURE — 200N000001 HC R&B ICU

## 2024-12-07 PROCEDURE — 250N000013 HC RX MED GY IP 250 OP 250 PS 637: Performed by: INTERNAL MEDICINE

## 2024-12-07 PROCEDURE — 82435 ASSAY OF BLOOD CHLORIDE: CPT | Performed by: INTERNAL MEDICINE

## 2024-12-07 PROCEDURE — 96361 HYDRATE IV INFUSION ADD-ON: CPT

## 2024-12-07 PROCEDURE — 80048 BASIC METABOLIC PNL TOTAL CA: CPT | Performed by: INTERNAL MEDICINE

## 2024-12-07 RX ORDER — NICOTINE POLACRILEX 4 MG
15-30 LOZENGE BUCCAL
Status: DISCONTINUED | OUTPATIENT
Start: 2024-12-07 | End: 2024-12-07

## 2024-12-07 RX ORDER — GUAIFENESIN/DEXTROMETHORPHAN 100-10MG/5
10 SYRUP ORAL EVERY 4 HOURS PRN
Status: DISCONTINUED | OUTPATIENT
Start: 2024-12-07 | End: 2024-12-13 | Stop reason: HOSPADM

## 2024-12-07 RX ORDER — HYDROCORTISONE SODIUM SUCCINATE 100 MG/2ML
50 INJECTION INTRAMUSCULAR; INTRAVENOUS EVERY 12 HOURS
Status: DISCONTINUED | OUTPATIENT
Start: 2024-12-09 | End: 2024-12-10

## 2024-12-07 RX ORDER — METOCLOPRAMIDE HYDROCHLORIDE 5 MG/5ML
10 SOLUTION ORAL
Status: DISCONTINUED | OUTPATIENT
Start: 2024-12-07 | End: 2024-12-13 | Stop reason: HOSPADM

## 2024-12-07 RX ORDER — SODIUM CHLORIDE 1 G/1
1 TABLET ORAL 2 TIMES DAILY
Status: DISCONTINUED | OUTPATIENT
Start: 2024-12-07 | End: 2024-12-13 | Stop reason: HOSPADM

## 2024-12-07 RX ORDER — LIDOCAINE 40 MG/G
CREAM TOPICAL
Status: DISCONTINUED | OUTPATIENT
Start: 2024-12-07 | End: 2024-12-13 | Stop reason: HOSPADM

## 2024-12-07 RX ORDER — LEVOTHYROXINE SODIUM 88 UG/1
88 TABLET ORAL DAILY
Status: DISCONTINUED | OUTPATIENT
Start: 2024-12-08 | End: 2024-12-13 | Stop reason: HOSPADM

## 2024-12-07 RX ORDER — AMOXICILLIN 250 MG
2 CAPSULE ORAL 2 TIMES DAILY PRN
Status: DISCONTINUED | OUTPATIENT
Start: 2024-12-07 | End: 2024-12-13 | Stop reason: HOSPADM

## 2024-12-07 RX ORDER — SENNOSIDES 8.6 MG
1 TABLET ORAL 2 TIMES DAILY PRN
Status: DISCONTINUED | OUTPATIENT
Start: 2024-12-07 | End: 2024-12-07

## 2024-12-07 RX ORDER — DEXTROSE MONOHYDRATE 100 MG/ML
INJECTION, SOLUTION INTRAVENOUS CONTINUOUS PRN
Status: DISCONTINUED | OUTPATIENT
Start: 2024-12-07 | End: 2024-12-13 | Stop reason: HOSPADM

## 2024-12-07 RX ORDER — CARBAMAZEPINE 100 MG/5ML
100 SUSPENSION ORAL DAILY
Status: DISCONTINUED | OUTPATIENT
Start: 2024-12-07 | End: 2024-12-13 | Stop reason: HOSPADM

## 2024-12-07 RX ORDER — ACETYLCYSTEINE 200 MG/ML
2 SOLUTION ORAL; RESPIRATORY (INHALATION) 4 TIMES DAILY
Status: DISCONTINUED | OUTPATIENT
Start: 2024-12-07 | End: 2024-12-13 | Stop reason: HOSPADM

## 2024-12-07 RX ORDER — ENOXAPARIN SODIUM 100 MG/ML
40 INJECTION SUBCUTANEOUS EVERY 24 HOURS
Status: DISCONTINUED | OUTPATIENT
Start: 2024-12-07 | End: 2024-12-13 | Stop reason: HOSPADM

## 2024-12-07 RX ORDER — SODIUM CHLORIDE, SODIUM LACTATE, POTASSIUM CHLORIDE, CALCIUM CHLORIDE 600; 310; 30; 20 MG/100ML; MG/100ML; MG/100ML; MG/100ML
INJECTION, SOLUTION INTRAVENOUS CONTINUOUS
Status: ACTIVE | OUTPATIENT
Start: 2024-12-07 | End: 2024-12-08

## 2024-12-07 RX ORDER — ACETAMINOPHEN 325 MG/10.15ML
650 LIQUID ORAL EVERY 6 HOURS PRN
Status: DISCONTINUED | OUTPATIENT
Start: 2024-12-07 | End: 2024-12-13 | Stop reason: HOSPADM

## 2024-12-07 RX ORDER — POLYETHYLENE GLYCOL 3350 17 G/17G
17 POWDER, FOR SOLUTION ORAL DAILY PRN
Status: DISCONTINUED | OUTPATIENT
Start: 2024-12-07 | End: 2024-12-13 | Stop reason: HOSPADM

## 2024-12-07 RX ORDER — CEFAZOLIN SODIUM 1 G/50ML
750 SOLUTION INTRAVENOUS EVERY 12 HOURS
Status: DISCONTINUED | OUTPATIENT
Start: 2024-12-07 | End: 2024-12-07

## 2024-12-07 RX ORDER — CARBAMAZEPINE 100 MG/5ML
150 SUSPENSION ORAL 3 TIMES DAILY
Status: DISCONTINUED | OUTPATIENT
Start: 2024-12-07 | End: 2024-12-13 | Stop reason: HOSPADM

## 2024-12-07 RX ORDER — SCOPOLAMINE HYDROBROMIDE
0.8 POWDER (GRAM) MISCELLANEOUS 3 TIMES DAILY
Status: DISCONTINUED | OUTPATIENT
Start: 2024-12-07 | End: 2024-12-07

## 2024-12-07 RX ORDER — AMOXICILLIN 250 MG
1 CAPSULE ORAL 2 TIMES DAILY PRN
Status: DISCONTINUED | OUTPATIENT
Start: 2024-12-07 | End: 2024-12-13 | Stop reason: HOSPADM

## 2024-12-07 RX ORDER — DEXTROSE MONOHYDRATE 25 G/50ML
25-50 INJECTION, SOLUTION INTRAVENOUS
Status: DISCONTINUED | OUTPATIENT
Start: 2024-12-07 | End: 2024-12-07

## 2024-12-07 RX ORDER — POLYETHYLENE GLYCOL 3350 17 G/17G
17 POWDER, FOR SOLUTION ORAL DAILY
Status: DISCONTINUED | OUTPATIENT
Start: 2024-12-07 | End: 2024-12-13 | Stop reason: HOSPADM

## 2024-12-07 RX ORDER — SODIUM CHLORIDE, SODIUM LACTATE, POTASSIUM CHLORIDE, CALCIUM CHLORIDE 600; 310; 30; 20 MG/100ML; MG/100ML; MG/100ML; MG/100ML
INJECTION, SOLUTION INTRAVENOUS CONTINUOUS
Status: ACTIVE | OUTPATIENT
Start: 2024-12-07 | End: 2024-12-07

## 2024-12-07 RX ORDER — BISACODYL 10 MG
10 SUPPOSITORY, RECTAL RECTAL DAILY PRN
Status: DISCONTINUED | OUTPATIENT
Start: 2024-12-07 | End: 2024-12-13 | Stop reason: HOSPADM

## 2024-12-07 RX ORDER — NICOTINE POLACRILEX 4 MG
15-30 LOZENGE BUCCAL
Status: DISCONTINUED | OUTPATIENT
Start: 2024-12-07 | End: 2024-12-13 | Stop reason: HOSPADM

## 2024-12-07 RX ORDER — PIPERACILLIN SODIUM, TAZOBACTAM SODIUM 4; .5 G/20ML; G/20ML
4.5 INJECTION, POWDER, LYOPHILIZED, FOR SOLUTION INTRAVENOUS EVERY 6 HOURS
Status: DISCONTINUED | OUTPATIENT
Start: 2024-12-07 | End: 2024-12-11

## 2024-12-07 RX ORDER — SALIVA STIMULANT COMB. NO.3
2 SPRAY, NON-AEROSOL (ML) MUCOUS MEMBRANE
Status: DISCONTINUED | OUTPATIENT
Start: 2024-12-07 | End: 2024-12-13 | Stop reason: HOSPADM

## 2024-12-07 RX ORDER — HYDROCORTISONE SODIUM SUCCINATE 100 MG/2ML
100 INJECTION INTRAMUSCULAR; INTRAVENOUS EVERY 8 HOURS
Status: COMPLETED | OUTPATIENT
Start: 2024-12-07 | End: 2024-12-07

## 2024-12-07 RX ORDER — ALBUTEROL SULFATE 0.83 MG/ML
2.5 SOLUTION RESPIRATORY (INHALATION)
Status: DISCONTINUED | OUTPATIENT
Start: 2024-12-07 | End: 2024-12-13 | Stop reason: HOSPADM

## 2024-12-07 RX ORDER — GUAIFENESIN 600 MG/1
15 TABLET, EXTENDED RELEASE ORAL DAILY
Status: DISCONTINUED | OUTPATIENT
Start: 2024-12-07 | End: 2024-12-13 | Stop reason: HOSPADM

## 2024-12-07 RX ORDER — BACITRACIN ZINC 500 [USP'U]/G
OINTMENT TOPICAL DAILY PRN
Status: DISCONTINUED | OUTPATIENT
Start: 2024-12-07 | End: 2024-12-13 | Stop reason: HOSPADM

## 2024-12-07 RX ORDER — CEFAZOLIN SODIUM 1 G/50ML
750 SOLUTION INTRAVENOUS EVERY 12 HOURS
Status: DISCONTINUED | OUTPATIENT
Start: 2024-12-07 | End: 2024-12-11

## 2024-12-07 RX ORDER — ONDANSETRON 2 MG/ML
4 INJECTION INTRAMUSCULAR; INTRAVENOUS EVERY 6 HOURS PRN
Status: DISCONTINUED | OUTPATIENT
Start: 2024-12-07 | End: 2024-12-13 | Stop reason: HOSPADM

## 2024-12-07 RX ORDER — AZITHROMYCIN MONOHYDRATE 500 MG/5ML
500 INJECTION, POWDER, LYOPHILIZED, FOR SOLUTION INTRAVENOUS EVERY 24 HOURS
Status: COMPLETED | OUTPATIENT
Start: 2024-12-07 | End: 2024-12-07

## 2024-12-07 RX ORDER — LEVOTHYROXINE SODIUM 88 UG/1
88 TABLET ORAL DAILY
Status: DISCONTINUED | OUTPATIENT
Start: 2024-12-07 | End: 2024-12-07

## 2024-12-07 RX ORDER — PIPERACILLIN SODIUM, TAZOBACTAM SODIUM 3; .375 G/15ML; G/15ML
3.38 INJECTION, POWDER, LYOPHILIZED, FOR SOLUTION INTRAVENOUS EVERY 6 HOURS
Status: DISCONTINUED | OUTPATIENT
Start: 2024-12-07 | End: 2024-12-07 | Stop reason: DRUGHIGH

## 2024-12-07 RX ORDER — DEXTROSE MONOHYDRATE 25 G/50ML
25-50 INJECTION, SOLUTION INTRAVENOUS
Status: DISCONTINUED | OUTPATIENT
Start: 2024-12-07 | End: 2024-12-13 | Stop reason: HOSPADM

## 2024-12-07 RX ORDER — METOCLOPRAMIDE HYDROCHLORIDE 5 MG/5ML
10 SOLUTION ORAL
Status: DISCONTINUED | OUTPATIENT
Start: 2024-12-07 | End: 2024-12-07

## 2024-12-07 RX ORDER — ROPIVACAINE IN 0.9% SOD CHL/PF 0.1 %
.01-.125 PLASTIC BAG, INJECTION (ML) EPIDURAL CONTINUOUS
Status: DISCONTINUED | OUTPATIENT
Start: 2024-12-07 | End: 2024-12-08

## 2024-12-07 RX ORDER — HYDROCORTISONE SODIUM SUCCINATE 100 MG/2ML
50 INJECTION INTRAMUSCULAR; INTRAVENOUS EVERY 8 HOURS
Status: COMPLETED | OUTPATIENT
Start: 2024-12-08 | End: 2024-12-08

## 2024-12-07 RX ADMIN — VANCOMYCIN HYDROCHLORIDE 750 MG: 500 INJECTION, POWDER, LYOPHILIZED, FOR SOLUTION INTRAVENOUS at 22:25

## 2024-12-07 RX ADMIN — INSULIN ASPART 1 UNITS: 100 INJECTION, SOLUTION INTRAVENOUS; SUBCUTANEOUS at 16:24

## 2024-12-07 RX ADMIN — METOCLOPRAMIDE HYDROCHLORIDE 10 MG: 5 SOLUTION ORAL at 08:45

## 2024-12-07 RX ADMIN — PANTOPRAZOLE SODIUM 40 MG: 40 INJECTION, POWDER, FOR SOLUTION INTRAVENOUS at 21:14

## 2024-12-07 RX ADMIN — PANTOPRAZOLE SODIUM 40 MG: 40 INJECTION, POWDER, FOR SOLUTION INTRAVENOUS at 06:48

## 2024-12-07 RX ADMIN — LEVOTHYROXINE SODIUM 88 MCG: 88 TABLET ORAL at 06:47

## 2024-12-07 RX ADMIN — Medication 1 G: at 08:45

## 2024-12-07 RX ADMIN — PIPERACILLIN AND TAZOBACTAM 4.5 G: 4; .5 INJECTION, POWDER, FOR SOLUTION INTRAVENOUS at 16:01

## 2024-12-07 RX ADMIN — SODIUM CHLORIDE, POTASSIUM CHLORIDE, SODIUM LACTATE AND CALCIUM CHLORIDE 500 ML: 600; 310; 30; 20 INJECTION, SOLUTION INTRAVENOUS at 09:00

## 2024-12-07 RX ADMIN — HYDROCORTISONE SODIUM SUCCINATE 100 MG: 100 INJECTION, POWDER, FOR SOLUTION INTRAMUSCULAR; INTRAVENOUS at 03:37

## 2024-12-07 RX ADMIN — HYDROCORTISONE SODIUM SUCCINATE 100 MG: 100 INJECTION, POWDER, FOR SOLUTION INTRAMUSCULAR; INTRAVENOUS at 18:33

## 2024-12-07 RX ADMIN — ALBUTEROL SULFATE 2.5 MG: 2.5 SOLUTION RESPIRATORY (INHALATION) at 20:11

## 2024-12-07 RX ADMIN — BRIVARACETAM 100 MG: 10 SOLUTION ORAL at 22:25

## 2024-12-07 RX ADMIN — ACETYLCYSTEINE 2 ML: 200 SOLUTION ORAL; RESPIRATORY (INHALATION) at 09:01

## 2024-12-07 RX ADMIN — SODIUM CHLORIDE, POTASSIUM CHLORIDE, SODIUM LACTATE AND CALCIUM CHLORIDE 1000 ML: 600; 310; 30; 20 INJECTION, SOLUTION INTRAVENOUS at 00:18

## 2024-12-07 RX ADMIN — Medication 1 G: at 21:42

## 2024-12-07 RX ADMIN — CARBAMAZEPINE 100 MG: 100 SUSPENSION ORAL at 18:31

## 2024-12-07 RX ADMIN — METOCLOPRAMIDE HYDROCHLORIDE 10 MG: 5 SOLUTION ORAL at 16:00

## 2024-12-07 RX ADMIN — ALBUTEROL SULFATE 2.5 MG: 2.5 SOLUTION RESPIRATORY (INHALATION) at 09:01

## 2024-12-07 RX ADMIN — POLYETHYLENE GLYCOL 3350 17 G: 17 POWDER, FOR SOLUTION ORAL at 08:45

## 2024-12-07 RX ADMIN — SODIUM CHLORIDE, POTASSIUM CHLORIDE, SODIUM LACTATE AND CALCIUM CHLORIDE 1000 ML: 600; 310; 30; 20 INJECTION, SOLUTION INTRAVENOUS at 05:00

## 2024-12-07 RX ADMIN — NOREPINEPHRINE BITARTRATE 0.03 MCG/KG/MIN: 0.02 INJECTION, SOLUTION INTRAVENOUS at 11:30

## 2024-12-07 RX ADMIN — ACETYLCYSTEINE 2 ML: 200 SOLUTION ORAL; RESPIRATORY (INHALATION) at 20:11

## 2024-12-07 RX ADMIN — Medication 15 ML: at 12:08

## 2024-12-07 RX ADMIN — CARBAMAZEPINE 150 MG: 100 SUSPENSION ORAL at 06:47

## 2024-12-07 RX ADMIN — INSULIN ASPART 1 UNITS: 100 INJECTION, SOLUTION INTRAVENOUS; SUBCUTANEOUS at 13:25

## 2024-12-07 RX ADMIN — SODIUM CHLORIDE, POTASSIUM CHLORIDE, SODIUM LACTATE AND CALCIUM CHLORIDE: 600; 310; 30; 20 INJECTION, SOLUTION INTRAVENOUS at 22:54

## 2024-12-07 RX ADMIN — VANCOMYCIN HYDROCHLORIDE 1500 MG: 10 INJECTION, POWDER, LYOPHILIZED, FOR SOLUTION INTRAVENOUS at 10:33

## 2024-12-07 RX ADMIN — Medication 1 PACKET: at 08:45

## 2024-12-07 RX ADMIN — METOCLOPRAMIDE HYDROCHLORIDE 10 MG: 5 SOLUTION ORAL at 12:08

## 2024-12-07 RX ADMIN — BACITRACIN: 500 OINTMENT TOPICAL at 06:51

## 2024-12-07 RX ADMIN — PIPERACILLIN AND TAZOBACTAM 4.5 G: 4; .5 INJECTION, POWDER, FOR SOLUTION INTRAVENOUS at 21:13

## 2024-12-07 RX ADMIN — HYDROCORTISONE SODIUM SUCCINATE 100 MG: 100 INJECTION, POWDER, FOR SOLUTION INTRAMUSCULAR; INTRAVENOUS at 10:23

## 2024-12-07 RX ADMIN — AZITHROMYCIN MONOHYDRATE 500 MG: 500 INJECTION, POWDER, LYOPHILIZED, FOR SOLUTION INTRAVENOUS at 04:13

## 2024-12-07 RX ADMIN — METOCLOPRAMIDE HYDROCHLORIDE 10 MG: 5 SOLUTION ORAL at 21:13

## 2024-12-07 RX ADMIN — ENOXAPARIN SODIUM 40 MG: 40 INJECTION SUBCUTANEOUS at 08:45

## 2024-12-07 RX ADMIN — PIPERACILLIN AND TAZOBACTAM 4.5 G: 4; .5 INJECTION, POWDER, FOR SOLUTION INTRAVENOUS at 03:37

## 2024-12-07 RX ADMIN — PIPERACILLIN AND TAZOBACTAM 4.5 G: 4; .5 INJECTION, POWDER, FOR SOLUTION INTRAVENOUS at 10:16

## 2024-12-07 RX ADMIN — SODIUM CHLORIDE, POTASSIUM CHLORIDE, SODIUM LACTATE AND CALCIUM CHLORIDE 250 ML/HR: 600; 310; 30; 20 INJECTION, SOLUTION INTRAVENOUS at 08:27

## 2024-12-07 ASSESSMENT — ACTIVITIES OF DAILY LIVING (ADL)
ADLS_ACUITY_SCORE: 81
ADLS_ACUITY_SCORE: 75
ADLS_ACUITY_SCORE: 63
ADLS_ACUITY_SCORE: 75
ADLS_ACUITY_SCORE: 81
ADLS_ACUITY_SCORE: 75
ADLS_ACUITY_SCORE: 81
ADLS_ACUITY_SCORE: 75
ADLS_ACUITY_SCORE: 63
ADLS_ACUITY_SCORE: 81
ADLS_ACUITY_SCORE: 65
ADLS_ACUITY_SCORE: 63
ADLS_ACUITY_SCORE: 75
ADLS_ACUITY_SCORE: 75
ADLS_ACUITY_SCORE: 81
ADLS_ACUITY_SCORE: 75
ADLS_ACUITY_SCORE: 81

## 2024-12-07 NOTE — PHARMACY-VANCOMYCIN DOSING SERVICE
"Pharmacy Vancomycin Initial Note  Date of Service 2024  Patient's  1960  64 year old, male    Indication: Sepsis    Current estimated CrCl = Estimated Creatinine Clearance: 76.2 mL/min (based on SCr of 1.05 mg/dL).    Creatinine for last 3 days  2024:  8:03 PM Creatinine 0.97 mg/dL  2024:  4:07 AM Creatinine 1.05 mg/dL    Recent Vancomycin Level(s) for last 3 days  No results found for requested labs within last 3 days.      Vancomycin IV Administrations (past 72 hours)        No vancomycin orders with administrations in past 72 hours.                    Nephrotoxins and other renal medications (From now, onward)      Start     Dose/Rate Route Frequency Ordered Stop    24 2100  vancomycin (VANCOCIN) 750 mg in sodium chloride 0.9 % 282.5 mL intermittent infusion         750 mg  over 60 Minutes Intravenous EVERY 12 HOURS 24 0853      24 0900  vancomycin (VANCOCIN) 1,500 mg in 0.9% NaCl 265 mL intermittent infusion         1,500 mg  over 90 Minutes Intravenous ONCE 24 0853      24 0400  piperacillin-tazobactam (ZOSYN) 4.5 g vial to attach to  mL bag        Note to Pharmacy: For [facility], [facility] and [facility]: For Zosyn-naive patients, use the \"Zosyn initial dose + extended infusion\" order panel.    4.5 g  over 30 Minutes Intravenous EVERY 6 HOURS 24 0204 24 0359            Contrast Orders - past 72 hours (72h ago, onward)      Start     Dose/Rate Route Frequency Stop    24 2240  iopamidol (ISOVUE-370) solution 75 mL         75 mL Intravenous ONCE 24 2249            [employer] Prediction of Planned Initial Vancomycin Regimen  Loading dose: 1500 mg at 00:00 2024.  Regimen: 750 mg IV every 12 hours.  Start time: 12:00 on 2024  Exposure target: AUC24 (range)400-600 mg/L.hr   AUC24,ss: 469 mg/L.hr  Probability of AUC24 > 400: 67 %  Ctrough,ss: 15.4 mg/L  Probability of Ctrough,ss > 20: 27 %  Probability of nephrotoxicity (Lodise ERNESTO " 2009): 11 %          Plan:  Start vancomycin  1500 mg IV load, then 750 mg q12hrs  Vancomycin monitoring method: AUC  Vancomycin therapeutic monitoring goal: 400-600 mg*h/L  Pharmacy will check vancomycin levels as appropriate in 1-3 Days.    Serum creatinine levels will be ordered daily for the first week of therapy and at least twice weekly for subsequent weeks.      Lee Hurley RPH

## 2024-12-07 NOTE — PROGRESS NOTES
RECEIVING UNIT ED HANDOFF REVIEW    ED Nurse Handoff Report was reviewed by: Sammy Be RN on December 7, 2024 at 1:39 AM

## 2024-12-07 NOTE — PROGRESS NOTES
Transfer Note    Patient transferred to ICU via cart accompanied by flyer.  Report called to NANCI Cary. Discussed plan of care with patient and family.  Reviewed belongings checklist - checklist and belongings sent with patient: Yes
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
## 2024-12-07 NOTE — CONSULTS
CLINICAL NUTRITION SERVICES  -  ASSESSMENT NOTE      Recommendations Ordered by Registered Dietitian (RD):   - Initiate feeds with [employer] Standard 1.4 @ 10 ml/hr via Jtube  - Adv by 30 ml q4h to eventual goal rate @ 70 ml/hr x 22 hrs.   - FWF of 30 ml q4h for tube patency. Adjust as needed for fluid needs   - Certavite daily to meet micronutrient needs.     Eventual Goal Regimen:  [employer] Standard 1.4 @ 70 ml/hr x 22 hrs via Jtube (hold 1 hr before and after levothyroxine). This provides 1540 ml/day, 2156 kcal (29 kcal/kg), 95 gm protein (1.3 gm/kg), 241 gm CHO, 1093 ml free water, 23 gm fiber.      Future/Additional Recommendations:   Follow WOC assessment      Malnutrition:   Does not meet criteria for malnutrition        REASON FOR ASSESSMENT  Keyon Farias is a 64 year old male seen by Registered Dietitian for Provider Order - Registered Dietitian to Assess and Order TF per Medical Nutrition Therapy Protocol.    PMH includes TBI and panhypopituitarism, prediabetes, chronic aspiration with GJtube dependence. Admitted with severe sepsis 2/2 recurrent aspiration.       NUTRITION HISTORY  Pt with aphasia, RRT called this morning due to decreased responsiveness this morning. Spoke with patient's guardian, Savannah via the phone. She provides the following nutrition hx:    Home TF regimen:  [employer] @ 70 ml/hr x 22 hrs. Savannah is unsure formula's kcal/ml.   FWF: 30 ml after meds, 2 syringes of water every 2-4 hours  Supplements: Vit C, D and B12. Multivitamin, Probiotics         CURRENT NUTRITION ORDERS  Diet Order:     NPO     NUTRITION FOCUSED PHYSICAL ASSESSMENT FOR DIAGNOSING MALNUTRITION)       Observed:    Muscle wasting (refer to documentation in Malnutrition section)  WOC consulted, assessment pending     ANTHROPOMETRICS  Height: Data Unavailable  Weight: 162 lbs 14.72 oz  Body mass index is 23.38 kg/m .  Weight Status:  Normal BMI  IBW: 75.5 kg  % IBW: 98  Weight History: Based on records, weight  around 150 lbs lately. Patient mother feels he's lost muscle mass recently. Appears to be up currently, possible related to fluid fluctuations (?) .   Wt Readings from Last 10 Encounters:   12/07/24 73.9 kg (162 lb 14.7 oz)   12/01/24 68 kg (150 lb)   11/03/24 68.1 kg (150 lb 1.6 oz)   10/21/24 68.1 kg (150 lb 2.1 oz)   09/18/24 68.1 kg (150 lb 2.1 oz)   08/31/24 67 kg (147 lb 11.3 oz)   08/05/24 67.6 kg (149 lb)   07/24/24 67.9 kg (149 lb 11.1 oz)   07/12/24 69.4 kg (153 lb)   06/04/24 74.8 kg (165 lb)       LABS  Labs reviewed  Lactic acid 3.5     MEDICATIONS  Medications reviewed  Medium sliding scale insulin, Reglan QID, IV protonix 40 mg, Miralax, NeutraPhos 1 pkt/day       ASSESSED NUTRITION NEEDS PER APPROVED PRACTICE GUIDELINES:    Dosing Weight 74 kg (actual, admit wt on 12/7)   Estimated Energy Needs: 2834-8653 kcals (25-30 Kcal/Kg)  Justification: maintenance  Estimated Protein Needs:  grams protein (1.2-1.5 g pro/Kg)  Justification: hypercatabolism with acute illness  Estimated Fluid Needs: 8128-4116+  mL (1 mL/Kcal)  Justification: maintenance    MALNUTRITION:  % Weight Loss:  None noted  % Intake:  Decreased intake does not meet criteria for malnutrition   Subcutaneous Fat Loss:  None observed  Muscle Loss:  Scapular bone region mild depletion, Patellar region mild depletion, Anterior thigh region mild depletion, and Posterior calf region mild depletion  Fluid Retention:  None noted    Malnutrition Diagnosis: Patient does not meet two of the above criteria necessary for diagnosing malnutrition      NUTRITION DIAGNOSIS:  Inadequate enteral nutrition infusion related to NPO with GJtube dependence as evidenced by currently meeting 0% nutrition needs without enteral nutrition infusion.       INTERVENTIONS  Recommendations / Nutrition Prescription  See above    Goals  TF to advance to goal rate within 24-48 hours.       MONITORING AND EVALUATION:  Progress towards goals will be monitored and evaluated  per protocol and Practice Guidelines    Irma Schulz RD, LD, CNSC   Available on [facility]

## 2024-12-07 NOTE — ED TRIAGE NOTES
Pt arrives via EMS, mother called d/t ongoing fever and cough. Mother reported given pt tylenol at 1600 and neb treatment but fever remains elevated. O2 sats on room air for EMS high 80s, 2L NC mid 90s.      Triage Assessment (Adult)       Row Name 12/06/24 1947          Triage Assessment    Airway WDL WDL        Respiratory WDL    Respiratory WDL X;cough     Cough Type nonproductive        Cardiac WDL    Cardiac WDL X;all     Pulse Rate & Regularity tachycardic        Cognitive/Neuro/Behavioral WDL    Cognitive/Neuro/Behavioral WDL X     Level of Consciousness somnolent     Arousal Level opens eyes spontaneously     Orientation other (see comments)  unable to verbalize        Pupils (CN II)    Pupil PERRLA yes     Pupil Size Left 2 mm     Pupil Size Right 2 mm

## 2024-12-07 NOTE — PLAN OF CARE
Goal Outcome Evaluation:  Orientation: Alert, HECTOR orientation nonverbal, calm and cooperative. Hx TBI  Aggression Stop Light: green  Activity: strict bedrest, total care, turn and repo q2, HOB at least 30 degrees  Diet/BS Checks: Strict NPO until nutrition sees him for his GJ tube. Q4 .  Tele: n/a  IV Access/Drains: PIV with intermittent abx and boluses  Pain Management: denies  Abnormal VS/Results: Tachy, O2 stable on 2L, Lactic 3.4 - awaiting recheck, WBC 18.9, K and Mag protocol - awaiting mag results  Bowel/Bladder: Incontinent of bowel/bladder; external catheter in place. Tap water enema given due to CT showing large amount of stool in rectum, Large tan BM resulted from tap water enema  Skin/Wounds: dry flaky skin-scattered on face. Scattered bruises, PI on sacrum, redness blanchable sacrum, scattered scabs including R 2nd toe,   Consults: WOC social work, nutrition consult  D/C Disposition: pending  Other Info: hx of epilepsy seizure precautions, L side stronger than R

## 2024-12-07 NOTE — PHARMACY-ADMISSION MEDICATION HISTORY
Pharmacist Admission Medication History    Admission medication history is complete. The information provided in this note is only as accurate as the sources available at the time of the update.    Information Source(s): Family member, Caregiver, and CareEverywhere/SureScripts via phone    Pertinent Information: Talked to lizbeth Nuñez on the phone    Changes made to PTA medication list:  Added: None  Deleted: augmentin  Changed: apap liquid to tabs. Mvi from liquid to tabs. Mother had difficulty obtaining liquids.  -Miralax to prn. Mucinex to prn.   -NaCl 1gm tablets to 1/4 tsp table salt- Mother had difficulty getting the tablets down the FT.    Allergies reviewed with patient and updates made in EHR: yes    Medication History Completed By: Jean Paul Sarabia RPH 12/6/2024 9:13 PM    PTA Med List   Medication Sig Note Last Dose/Taking    acetaminophen (TYLENOL) 500 MG tablet Take 500-1,000 mg by mouth every 6 hours as needed for mild pain.  12/6/2024 at  3:00 PM    albuterol (PROVENTIL) (5 MG/ML) 0.5% neb solution Take 0.5 mLs (2.5 mg) by nebulization every 6 hours as needed for shortness of breath, wheezing or cough 0700 1100 1500 1900 with mucomyst  12/6/2024 at  6:00 PM    bacitracin 500 UNIT/GM external ointment Apply topically daily as needed for wound care To PEG site.  Unknown    Brivaracetam (BRIVIACT) 10 MG/ML solution Take 100 mg by mouth or Feeding Tube 2 times daily 0900, 2100  12/6/2024 Morning    carBAMazepine (TEGRETOL) 100 MG/5ML suspension Take 7.5 mLs (150 mg) by mouth 3 times daily. 12/6/2024: Given at 0600, 1200, 0000 12/6/2024 Noon    carBAMazepine (TEGRETOL) 100 MG/5ML suspension Place 5 mLs (100 mg) into Feeding Tube daily.  12/6/2024 at  6:00 PM    COMPOUNDED NON-CONTROLLED SUBSTANCE (CMPD RX) - PHARMACY TO MIX COMPOUNDED MEDICATION Scopolamine 0.4mg capsules - take  2 capsule by feeding tube three times daily as needed  Unknown    Cyanocobalamin (VITAMIN B-12 PO) Place 1,000 mcg into Feeding  Tube every morning.  12/6/2024 Morning    Dextromethorphan-guaiFENesin (MUCINEX FAST-MAX DM MAX) 5-100 MG/5ML LIQD Take 20 mLs by mouth every 4 hours as needed.  12/6/2024 Morning    glycopyrrolate (CUVPOSA) 1 MG/5ML solution Take 10 mLs (2 mg) by mouth 2 times daily.  12/6/2024 Morning    hydrocortisone (CORTAID) 1 % external cream Apply topically 2 times daily as needed Apply to reddened memo areas as needed  Unknown    hydrocortisone (CORTEF) 2 mg/mL SUSP Take 7.5 mLs (15 mg) by G tube in the morning, and 3.7mls (7.5mg) by G tube at 2PM 12/6/2024: Got double dose for fever as instructed on 12/6/24 12/6/2024 Evening    levothyroxine (SYNTHROID/LEVOTHROID) 88 MCG tablet Take 1 tablet (88 mcg) by mouth daily.  12/6/2024 Morning    metoclopramide (REGLAN) 10 MG/10ML SOLN solution TAKE 10 ML BY MOUTH FOUR TIMES DAILY(BEFORE MEALS AND NIGHTLY) AT 8 AM, 12 PM, 4 PM, AND 8 PM.  12/6/2024 Evening    miconazole (MICATIN) 2 % external powder Apply topically 2 times daily as needed  Unknown    multivitamin, therapeutic (THERA-VIT) TABS tablet Take 1 tablet by mouth daily.  12/6/2024 Morning    NONFORMULARY Place 0.25 teaspoonful into Feeding Tube 2 times daily. Table salt  12/6/2024 Morning    pantoprazole (PROTONIX) 2 mg/mL SUSP suspension Place 20 mLs (40 mg) into Feeding Tube 2 times daily  12/6/2024 Morning    polyethylene glycol (MIRALAX) 17 GM/Dose powder Take 17 g by mouth daily. (Patient taking differently: Take 17 g by mouth daily as needed for constipation.)  12/6/2024 Morning    potassium & sodium phosphates (NEUTRA-PHOS) 280-160-250 MG Packet Take 1 packet by mouth daily  12/6/2024 Morning    sennosides (SENOKOT) 8.6 MG tablet Take 1 tablet by mouth 2 times daily as needed for constipation  12/6/2024 Morning    testosterone cypionate (DEPOTESTOSTERONE) 200 MG/ML injection INJECT 0.25ML IN THE MUSCLE ONCE A WEEK. 12/6/2024: Given on fridays 11/29/2024    vitamin C (ASCORBIC ACID) 1000 MG TABS 3,000 mg by Oral or  Feeding Tube route daily  12/6/2024 Morning    vitamin D3 (CHOLECALCIFEROL) 2000 units (50 mcg) tablet 4,000 Units by Per Feeding Tube route daily  12/6/2024 Morning

## 2024-12-07 NOTE — ED NOTES
Bed: ED08  Expected date:   Expected time:   Means of arrival:   Comments:  Kerri 1 64M infection, bed bound eta 1937   The ECG shows a sinus rhythm. ECG rate  = 98 bpm.
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
## 2024-12-07 NOTE — ED NOTES
[facility]  ED Nurse Handoff Report    ED Chief complaint: Fever      ED Diagnosis:   Final diagnoses:   Sepsis, due to unspecified organism, unspecified whether acute organ dysfunction present (H)   Pneumonia of both lower lobes due to infectious organism   Stercoral colitis       Code Status: Per admitting    Allergies:   Allergies   Allergen Reactions    Valproic Acid Other (See Comments)     Toxicity w/ bone marrow suspension, elevated ammonia levels   Poisons system    Scopolamine Hives     Hives with the patch - oral no problem    Vancomycin Other (See Comments)     Vancomycin flushing syndrome - pt tolerated dose at half rate.    Phenytoin Sodium      Tremor       Patient Story:   Pt presents to ED via EMS from home where his mom is his caregiver. Pt has had a cough and on ongoing fever. Pt's mom gave pt Tylenol and a nebulizer treatment prior to arrival with no improvement. Pt 102.9F rectally initially in ED. Given rectal Tylenol which improved to 100.9F rectal. CT showed pt has pneumonia. Initial lactic acid 3.4 with repeat after 1L LR 3.7. Second liter initiated. Pt's home medications ordered by pharmacy and given through pt's G tube in ER. Nonverbal at baseline. Pt suctioned and had mouth cleaned due to secretions in mouth.    Focused Assessment:    Neuro: Alert, nonverbal at baseline  Respiratory: On room air, pneumonia  Cardiology:  tachycardic 110s   Gastrointestinal: soft, non tender, non distended   Genitourinary/Renal:    Musculoskeletal: moves all extremities   Skin: Intact skin   Lines: 20G L forearm, GJ tube    Labs Ordered and Resulted from Time of ED Arrival to Time of ED Departure   COMPREHENSIVE METABOLIC PANEL - Abnormal       Result Value    Sodium 141      Potassium 4.2      Carbon Dioxide (CO2) 24      Anion Gap 13      Urea Nitrogen 25.2 (*)     Creatinine 0.97      GFR Estimate 88      Calcium 8.4 (*)     Chloride 104      Glucose 174 (*)     Alkaline Phosphatase 82       AST 62 (*)     ALT 40      Protein Total 7.4      Albumin 3.6      Bilirubin Total 0.2     ROUTINE UA WITH MICROSCOPIC REFLEX TO CULTURE - Abnormal    Color Urine Yellow      Appearance Urine Clear      Glucose Urine Negative      Bilirubin Urine Negative      Ketones Urine Negative      Specific Gravity Urine 1.028      Blood Urine Negative      pH Urine 7.5 (*)     Protein Albumin Urine 20 (*)     Urobilinogen Urine 4.0 (*)     Nitrite Urine Negative      Leukocyte Esterase Urine Negative      RBC Urine 1      WBC Urine 1     CBC WITH PLATELETS AND DIFFERENTIAL - Abnormal    WBC Count 20.5 (*)     RBC Count 5.02      Hemoglobin 14.2      Hematocrit 44.3      MCV 88      MCH 28.3      MCHC 32.1      RDW 15.7 (*)     Platelet Count 208      % Neutrophils 86      % Lymphocytes 6      % Monocytes 7      % Eosinophils 0      % Basophils 1      % Immature Granulocytes 0      NRBCs per 100 WBC 0      Absolute Neutrophils 17.6 (*)     Absolute Lymphocytes 1.3      Absolute Monocytes 1.4 (*)     Absolute Eosinophils 0.0      Absolute Basophils 0.1      Absolute Immature Granulocytes 0.1      Absolute NRBCs 0.0     ISTAT GASES LACTATE VENOUS POCT - Abnormal    Lactic Acid POCT 3.4 (*)     Bicarbonate Venous POCT 25      O2 Sat, Venous POCT 85 (*)     pCO2 Venous POCT 40      pH Venous POCT 7.40      pO2 Venous POCT 50 (*)     Base Excess/Deficit (+/-) POCT 0.0     ISTAT GASES LACTATE VENOUS POCT - Abnormal    Lactic Acid POCT 3.7 (*)     Bicarbonate Venous POCT 26      O2 Sat, Venous POCT 85 (*)     pCO2 Venous POCT 39 (*)     pH Venous POCT 7.43      pO2 Venous POCT 49 (*)     Base Excess/Deficit (+/-) POCT 1.0     INFLUENZA A/B, RSV AND SARS-COV2 PCR - Normal    Influenza A PCR Negative      Influenza B PCR Negative      RSV PCR Negative      SARS CoV2 PCR Negative     BLOOD CULTURE   RESPIRATORY AEROBIC BACTERIAL CULTURE        CT Chest/Abdomen/Pelvis w Contrast   Final Result   IMPRESSION:   1.  Extensive bilateral  lower lobe airspace opacities, either multifocal pneumonia or aspiration.      2.  Large amount of stool in the distended rectum. Minimal presacral edema. Correlate for fecal impaction and stercoral colitis.      3.  Unchanged 3.2 cm cystic lesion in the pancreatic tail.      XR Chest 2 Views   Final Result   IMPRESSION: Heart size and pulmonary vascularity normal. Slight elevation right hemidiaphragm. Subsegmental atelectasis in the bases. Lungs otherwise clear. No acute infiltrates or effusions. Feeding tube upper abdomen.            Treatments and/or interventions provided:      Medications   lactated ringers BOLUS 1,000 mL (1,000 mLs Intravenous $New Bag 12/7/24 0018)   lactated ringers BOLUS 1,000 mL (0 mLs Intravenous Stopped 12/7/24 0010)   acetaminophen (TYLENOL) Suppository 650 mg (650 mg Rectal $Given 12/6/24 2040)   piperacillin-tazobactam (ZOSYN) 4.5 g vial to attach to  mL bag (0 g Intravenous Stopped 12/6/24 2258)   iopamidol (ISOVUE-370) solution 75 mL (75 mLs Intravenous $Given 12/6/24 2249)   SalineFlush (63 mLs Intravenous $Given 12/6/24 2249)   Brivaracetam (BRIVIACT) solution 100 mg (100 mg Oral or Feeding Tube $Given 12/6/24 2349)   carBAMazepine (TEGretol) suspension 150 mg (150 mg Oral $Given 12/6/24 2349)   pantoprazole (PROTONIX) 2 mg/mL suspension 40 mg (40 mg Per Feeding Tube $Given 12/6/24 2349)   glycopyrrolate (ROBINUL) tablet 2 mg (2 mg Oral or Feeding Tube $Given 12/6/24 2349)        Patient's response to treatments and/or interventions:   Resting comfortably    To be done/followed up on inpatient unit:    See any in-patient orders    Does this patient have any cognitive concerns?:  hx tbi    Activity level - Baseline/Home:    Total Care    Activity Level - Current:     Total Care    Patient's Preferred language: English     Needed?: No    Isolation: None  Infection: Not Applicable  Patient tested for COVID 19 prior to admission: NO    Bariatric?: No    Vital Signs:    Vitals:    12/06/24 2330 12/06/24 2334 12/07/24 0000 12/07/24 0030   BP: 109/72  104/68 111/60   Pulse:   120 118   Resp:   25 12   Temp:   (!) 100.9  F (38.3  C)    TempSrc:   Rectal    SpO2: 96% 96% 96% 95%   Weight:           Cardiac Rhythm: sinus tachycardia    Was the PSS-3 completed:   Yes    Family Comments: Contact pt's mom with updates    For the majority of the shift this patient's behavior was Green.   Behavioral interventions performed were none    ED NURSE PHONE NUMBER: *[phone]      '

## 2024-12-07 NOTE — PROGRESS NOTES
Patient has been persistently hypotensive with progressively reduced responsiveness.  Please review RRT and H&P notes for further details regarding course so far.  Patient is being transferred to ICU for pressors and care will be resumed by the ICU team.  Hospitalist signing off for now.

## 2024-12-07 NOTE — ED PROVIDER NOTES
Emergency Department Note      History of Present Illness     Chief Complaint   Fever      HPI   Keyon Farias is a 64 year old male with history of TBI and aphasia presents to the emergency department for fever and worsening cough.  History limited as patient is nonverbal and unable to provide any history at baseline.  Per EMS, mother called EMS due to ongoing fever and cough and patient sounding worse with his cough. History obtained with EMS report in addition to nursing telephone triage note. Mother reports fever with axillary temperature and patient was given Tylenol at 3 PM this evening.  Oxygenation has been roughly around 90% at home.  Mother notes that patient has been having increased shortness of breath with congestion and rattling in his chest. Of note, patient was evaluated on 12/1/2024 and 11/29/2024 for concern for fever.  Mother not present at bedside today.    Independent Historian   EMS    Review of External Notes   Nursing telephone triage note from today.  12/1/2024 ED visit note.    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)     DVT of upper extremity (deep vein thrombosis) (H)     Gastro-oesophageal reflux disease     Panhypopituitarism (H)     Pneumonia     Seizures (H)     Sepsis due to urinary tract infection (H) 01/15/2021    Septic shock (H)     Spastic hemiplegia affecting dominant side (H)     Thyroid disease     Tracheostomy care (H)     Traumatic brain injury (H) 1989    Unspecified cerebral artery occlusion with cerebral infarction 1989    UTI (urinary tract infection)     Ventricular fibrillation (H)     Ventricular tachyarrhythmia (H)        Medications   acetaminophen (TYLENOL) 500 MG tablet  albuterol (PROVENTIL) (5 MG/ML) 0.5% neb solution  bacitracin 500 UNIT/GM external ointment  Brivaracetam (BRIVIACT) 10 MG/ML solution  carBAMazepine (TEGRETOL) 100 MG/5ML  "suspension  carBAMazepine (TEGRETOL) 100 MG/5ML suspension  COMPOUNDED NON-CONTROLLED SUBSTANCE (CMPD RX) - PHARMACY TO MIX COMPOUNDED MEDICATION  Cyanocobalamin (VITAMIN B-12 PO)  Dextromethorphan-guaiFENesin (MUCINEX FAST-MAX DM MAX) 5-100 MG/5ML LIQD  glycopyrrolate (CUVPOSA) 1 MG/5ML solution  hydrocortisone (CORTAID) 1 % external cream  hydrocortisone (CORTEF) 2 mg/mL SUSP  levothyroxine (SYNTHROID/LEVOTHROID) 88 MCG tablet  metoclopramide (REGLAN) 10 MG/10ML SOLN solution  miconazole (MICATIN) 2 % external powder  multivitamin, therapeutic (THERA-VIT) TABS tablet  NONFORMULARY  pantoprazole (PROTONIX) 2 mg/mL SUSP suspension  polyethylene glycol (MIRALAX) 17 GM/Dose powder  potassium & sodium phosphates (NEUTRA-PHOS) 280-160-250 MG Packet  sennosides (SENOKOT) 8.6 MG tablet  testosterone cypionate (DEPOTESTOSTERONE) 200 MG/ML injection  vitamin C (ASCORBIC ACID) 1000 MG TABS  vitamin D3 (CHOLECALCIFEROL) 2000 units (50 mcg) tablet  acetaminophen (TYLENOL) 160 MG/5ML liquid  acetylcysteine (MUCOMYST) 20 % neb solution  insulin syringe-needle U-100 (29G X 1/2\" 1 ML) 29G X 1/2\" 1 ML miscellaneous  multivitamin (CENTRUM) liquid  mupirocin (BACTROBAN) 2 % external ointment  Nutritional Supplements (BOOST HIGH PROTEIN) LIQD  sodium chloride 1 GM tablet        Surgical History   Past Surgical History:   Procedure Laterality Date    ENDOSCOPIC ULTRASOUND UPPER GASTROINTESTINAL TRACT (GI) N/A 1/30/2017    Procedure: ENDOSCOPIC ULTRASOUND, ESOPHAGOSCOPY / UPPER GASTROINTESTINAL TRACT (GI);  Surgeon: Jus Montana MD;  Location: [facility] OR    ENDOSCOPIC ULTRASOUND, ESOPHAGOSCOPY, GASTROSCOPY, DUODENOSCOPY (EGD), NECROSECTOMY N/A 2/7/2017    Procedure: ENDOSCOPIC ULTRASOUND, ESOPHAGOSCOPY, GASTROSCOPY, DUODENOSCOPY (EGD), NECROSECTOMY;  Surgeon: Jack Marcus MD;  Location: [facility] OR    ESOPHAGOSCOPY, GASTROSCOPY, DUODENOSCOPY (EGD), COMBINED  3/13/2014    Procedure: COMBINED ESOPHAGOSCOPY, GASTROSCOPY, " DUODENOSCOPY (EGD), BIOPSY SINGLE OR MULTIPLE;  gastroscopy;  Surgeon: Digna Rhodes MD;  Location:  GI    ESOPHAGOSCOPY, GASTROSCOPY, DUODENOSCOPY (EGD), COMBINED N/A 12/6/2016    Procedure: COMBINED ESOPHAGOSCOPY, GASTROSCOPY, DUODENOSCOPY (EGD);  Surgeon: Digna Rhodes MD;  Location:  GI    ESOPHAGOSCOPY, GASTROSCOPY, DUODENOSCOPY (EGD), COMBINED N/A 2/7/2017    Procedure: COMBINED ENDOSCOPIC ULTRASOUND, ESOPHAGOSCOPY, GASTROSCOPY, DUODENOSCOPY (EGD), FINE NEEDLE ASPIRATE/BIOPSY;  Surgeon: Too Thakur MD;  [facility]    ESOPHAGOSCOPY, GASTROSCOPY, DUODENOSCOPY (EGD), COMBINED N/A 7/3/2024    Procedure: Endoscopic ultrasound upper gastrointestinal tract (GI);  Surgeon: Digna Rhodes MD;  Location: [facility]    HEAD & NECK SURGERY      reconstructive facial surgery following accident in 1989    IR FOLLOW UP VISIT INPATIENT  2/20/2019    IR GASTRO JEJUNOSTOMY TUBE CHANGE  12/20/2018    IR GASTRO JEJUNOSTOMY TUBE CHANGE  2/4/2019    IR GASTRO JEJUNOSTOMY TUBE CHANGE  3/8/2019    IR GASTRO JEJUNOSTOMY TUBE CHANGE  8/7/2019    IR GASTRO JEJUNOSTOMY TUBE CHANGE  1/13/2020    IR GASTRO JEJUNOSTOMY TUBE CHANGE  1/30/2020    IR GASTRO JEJUNOSTOMY TUBE CHANGE  6/24/2020    IR GASTRO JEJUNOSTOMY TUBE CHANGE  9/17/2020    IR GASTRO JEJUNOSTOMY TUBE CHANGE  10/14/2020    IR GASTRO JEJUNOSTOMY TUBE CHANGE  2/16/2021    IR GASTRO JEJUNOSTOMY TUBE CHANGE  5/6/2021    IR GASTRO JEJUNOSTOMY TUBE CHANGE  5/25/2021    IR GASTRO JEJUNOSTOMY TUBE CHANGE  7/26/2021    IR GASTRO JEJUNOSTOMY TUBE CHANGE  9/29/2021    IR GASTRO JEJUNOSTOMY TUBE CHANGE  11/16/2021    IR GASTRO JEJUNOSTOMY TUBE CHANGE  3/18/2022    IR GASTRO JEJUNOSTOMY TUBE CHANGE  6/8/2022    IR GASTRO JEJUNOSTOMY TUBE CHANGE  7/1/2022    IR GASTRO JEJUNOSTOMY TUBE CHANGE  11/25/2022    IR GASTRO JEJUNOSTOMY TUBE CHANGE  5/1/2023    IR GASTRO JEJUNOSTOMY TUBE CHANGE  8/10/2023    IR GASTRO JEJUNOSTOMY TUBE CHANGE   9/21/2023    IR GASTRO JEJUNOSTOMY TUBE CHANGE  11/7/2023    IR GASTRO JEJUNOSTOMY TUBE CHANGE  5/1/2024    IR GASTRO JEJUNOSTOMY TUBE CHANGE  8/5/2024    IR GASTRO JEJUNOSTOMY TUBE CHANGE  10/23/2024    IR PICC EXCHANGE LEFT  8/15/2019    LAPAROSCOPIC APPENDECTOMY  7/30/2013    Procedure: LAPAROSCOPIC APPENDECTOMY;  LAPAROSCOPIC APPENDECTOMY;  Surgeon: Manish Pierce MD;  Location:  OR    LAPAROSCOPIC ASSISTED INSERTION TUBE GASTROTOMY N/A 9/7/2016    Procedure: LAPAROSCOPIC ASSISTED INSERTION TUBE GASTROSTOMY;  Surgeon: Manish Pierce MD;  Location:  OR    ORTHOPEDIC SURGERY      right hand repair    TRACHEOSTOMY N/A 9/3/2016    Procedure: TRACHEOSTOMY;  Surgeon: João Ortiz MD;  Location:  OR    TRACHEOSTOMY N/A 12/2/2016    Procedure: TRACHEOSTOMY;  Surgeon: João Ortiz MD;  Location:  OR    VASCULAR SURGERY         Physical Exam     Patient Vitals for the past 24 hrs:   BP Temp Temp src Pulse Resp SpO2 Weight   12/07/24 0030 111/60 -- -- 118 12 95 % --   12/07/24 0000 104/68 (!) 100.9  F (38.3  C) Rectal 120 25 96 % --   12/06/24 2334 -- -- -- -- -- 96 % --   12/06/24 2330 109/72 -- -- -- -- 96 % --   12/06/24 2315 -- -- -- -- -- 94 % --   12/06/24 2313 -- -- -- -- -- 94 % --   12/06/24 2215 -- -- -- -- -- 96 % --   12/06/24 2200 -- -- -- -- -- 97 % --   12/06/24 2130 -- -- -- -- -- 98 % --   12/06/24 2030 -- -- -- (!) 128 (!) 32 96 % --   12/06/24 2015 108/68 -- -- (!) 130 (!) 36 93 % --   12/06/24 1942 122/61 (!) 102.9  F (39.4  C) Rectal (!) 131 26 91 % 68 kg (150 lb)     Physical Exam  Constitutional:       Appearance: Ill-appearing.   HENT:      Head: Normocephalic and atraumatic.   Eyes:      Extraocular Movements: Extraocular movements intact.      Conjunctiva/sclera: Conjunctivae normal.   Cardiovascular:      Rate and Rhythm: Tachycardic rate and regular rhythm.   Pulmonary:      Effort: Pulmonary effort is tachypneic.       Breath sounds: Coarse breath sounds  bilaterally with crackles and wet cough.  Abdominal:      General: Abdomen is flat. There is no distension.      Palpations: Abdomen is soft.      Tenderness: There is no abdominal tenderness.   Musculoskeletal:      Cervical back: Normal range of motion. No rigidity.       Right lower leg: No edema.      Left lower leg: No edema.   Skin:     General: Skin is warm and dry.   Neurological:      General: No focal deficit present.      Mental Status: Alert and oriented to person, place, and time.   Psychiatric:         Mood and Affect: Mood normal.         Behavior: Behavior normal.    Diagnostics     Lab Results   Labs Ordered and Resulted from Time of ED Arrival to Time of ED Departure   COMPREHENSIVE METABOLIC PANEL - Abnormal       Result Value    Sodium 141      Potassium 4.2      Carbon Dioxide (CO2) 24      Anion Gap 13      Urea Nitrogen 25.2 (*)     Creatinine 0.97      GFR Estimate 88      Calcium 8.4 (*)     Chloride 104      Glucose 174 (*)     Alkaline Phosphatase 82      AST 62 (*)     ALT 40      Protein Total 7.4      Albumin 3.6      Bilirubin Total 0.2     ROUTINE UA WITH MICROSCOPIC REFLEX TO CULTURE - Abnormal    Color Urine Yellow      Appearance Urine Clear      Glucose Urine Negative      Bilirubin Urine Negative      Ketones Urine Negative      Specific Gravity Urine 1.028      Blood Urine Negative      pH Urine 7.5 (*)     Protein Albumin Urine 20 (*)     Urobilinogen Urine 4.0 (*)     Nitrite Urine Negative      Leukocyte Esterase Urine Negative      RBC Urine 1      WBC Urine 1     CBC WITH PLATELETS AND DIFFERENTIAL - Abnormal    WBC Count 20.5 (*)     RBC Count 5.02      Hemoglobin 14.2      Hematocrit 44.3      MCV 88      MCH 28.3      MCHC 32.1      RDW 15.7 (*)     Platelet Count 208      % Neutrophils 86      % Lymphocytes 6      % Monocytes 7      % Eosinophils 0      % Basophils 1      % Immature Granulocytes 0      NRBCs per 100 WBC 0      Absolute Neutrophils 17.6 (*)     Absolute  Lymphocytes 1.3      Absolute Monocytes 1.4 (*)     Absolute Eosinophils 0.0      Absolute Basophils 0.1      Absolute Immature Granulocytes 0.1      Absolute NRBCs 0.0     ISTAT GASES LACTATE VENOUS POCT - Abnormal    Lactic Acid POCT 3.4 (*)     Bicarbonate Venous POCT 25      O2 Sat, Venous POCT 85 (*)     pCO2 Venous POCT 40      pH Venous POCT 7.40      pO2 Venous POCT 50 (*)     Base Excess/Deficit (+/-) POCT 0.0     ISTAT GASES LACTATE VENOUS POCT - Abnormal    Lactic Acid POCT 3.7 (*)     Bicarbonate Venous POCT 26      O2 Sat, Venous POCT 85 (*)     pCO2 Venous POCT 39 (*)     pH Venous POCT 7.43      pO2 Venous POCT 49 (*)     Base Excess/Deficit (+/-) POCT 1.0     INFLUENZA A/B, RSV AND SARS-COV2 PCR - Normal    Influenza A PCR Negative      Influenza B PCR Negative      RSV PCR Negative      SARS CoV2 PCR Negative     BLOOD CULTURE   RESPIRATORY AEROBIC BACTERIAL CULTURE       Imaging   CT Chest/Abdomen/Pelvis w Contrast   Final Result   IMPRESSION:   1.  Extensive bilateral lower lobe airspace opacities, either multifocal pneumonia or aspiration.      2.  Large amount of stool in the distended rectum. Minimal presacral edema. Correlate for fecal impaction and stercoral colitis.      3.  Unchanged 3.2 cm cystic lesion in the pancreatic tail.      XR Chest 2 Views   Final Result   IMPRESSION: Heart size and pulmonary vascularity normal. Slight elevation right hemidiaphragm. Subsegmental atelectasis in the bases. Lungs otherwise clear. No acute infiltrates or effusions. Feeding tube upper abdomen.          EKG   None    Independent Interpretation   On my independent interpretation of chest x-ray, there is no obvious new focal opacity, mediastinal widening, or pneumothorax.    ED Course      Medications Administered   Medications   lactated ringers BOLUS 1,000 mL (0 mLs Intravenous Stopped 12/7/24 0010)   acetaminophen (TYLENOL) Suppository 650 mg (650 mg Rectal $Given 12/6/24 2040)    piperacillin-tazobactam (ZOSYN) 4.5 g vial to attach to  mL bag (0 g Intravenous Stopped 12/6/24 2258)   iopamidol (ISOVUE-370) solution 75 mL (75 mLs Intravenous $Given 12/6/24 2249)   SalineFlush (63 mLs Intravenous $Given 12/6/24 2249)   Brivaracetam (BRIVIACT) solution 100 mg (100 mg Oral or Feeding Tube $Given 12/6/24 2349)   carBAMazepine (TEGretol) suspension 150 mg (150 mg Oral $Given 12/6/24 2349)   pantoprazole (PROTONIX) 2 mg/mL suspension 40 mg (40 mg Per Feeding Tube $Given 12/6/24 2349)   glycopyrrolate (ROBINUL) tablet 2 mg (2 mg Oral or Feeding Tube $Given 12/6/24 2349)   lactated ringers BOLUS 1,000 mL (1,000 mLs Intravenous $New Bag 12/7/24 0018)       Procedures   Procedures     Discussion of Management   See ED course    ED Course   ED Course as of 12/07/24 0123   Fri Dec 06, 2024   2031 WBC(!): 20.5   Sat Dec 07, 2024   0016 Discussed patient with hospitalist Dr. Villavicencio who accepted patient for admission.       Additional Documentation  None    Medical Decision Making / Diagnosis     CMS Diagnoses: The patient has signs of sepsis   Sepsis ED evaluation   The patient has signs of sepsis as evidenced by:  1. Presence of 2 SIRS criteria, suspected infection, AND  2. Organ dysfunction: Lactic Acidosis with value >2.0 due to sepsis    Time zero:  2019  on 12/06/24 as this was the time when Lactate was resulted and the level was greater than 2.    Note: Due to a national blood culture bottle shortage, reduced blood cultures may have been drawn on this patient.    Lactic Acid Results:  Recent Labs   Lab Test 12/07/24  0012 12/06/24 2017 12/01/24  0221   LACT 3.7* 3.4* 1.3       3 Hour Bundle 6 Hour Bundle (Reassessment)   Blood Cultures before IV Antibiotics: Yes  Antibiotics given: see below  Prehospital fluid volume (mL):                     Total fluids given (ED +Pre-hospital):  Full 30 mL/kg bolus given based on weight: 2,040 mL   Repeat Lactic Acid Level: Ordered by reflex for 2 hours  "after initial lactic acid collection.  Vasopressors: MAP>65 after initial IVF bolus, will continue to monitor fluid status and vital signs.  Repeat perfusion exam: I attest to having performed a repeat sepsis exam and assessment of perfusion at  0014 .   BMI Readings from Last 1 Encounters:   12/06/24 21.52 kg/m        Anti-infectives (From admission through now)      Start     Dose/Rate Route Frequency Ordered Stop    12/06/24 2035  piperacillin-tazobactam (ZOSYN) 4.5 g vial to attach to  mL bag        Note to Pharmacy: For [facility], [facility] and [facility]: For Zosyn-naive patients, use the \"Zosyn initial dose + extended infusion\" order panel.    4.5 g  over 30 Minutes Intravenous ONCE 12/06/24 2032 12/06/24 2258                [facility]       None    [facility]   Keyon Farias is a 64 year old male as described above presents to the emergency department for fever and worsening cough.  Patient hemodynamically stable at time of evaluation.  Temperature of 102.9  F with associated tachycardia of 131.  On 2 L nasal cannula.  Suspect tachycardia secondary to febrile illness, but patient is at high risk for sepsis.  Patient is nonverbal and unable to provide significant history.  Differential diagnosis considered includes, but not limited to, sepsis, aspiration pneumonia, urinary tract infection, intra-abdominal infection, acute viral syndrome/viral URI, and dehydration.  Sepsis workup ordered.  Will start off of chest x-ray and urine analysis.  Additional workup and orders as listed in chart.    Ultimately, work up shows significant leukocytosis of 20.5 with lactic acidemia of 3.4.  Initial chest x-ray unremarkable and urine analysis does not demonstrate infectious source.  CT chest abdomen pelvis with contrast was ordered for evaluation for sepsis of unknown source which demonstrates bilateral lower lobe pneumonia concerning for aspiration which certainly is likely given patient's risk factors in conjunction with stercoral colitis.  Patient " was given IV Zosyn for sepsis coverage.    Patient was admitted to the hospital service for further evaluation and treatment.    Please refer to ED course above as part of continuation of MDM for details on the patient's treatment course and any potential changes or updates beyond my initial evaluation and MDM creation.    Disposition   The patient was admitted to the hospital.     Diagnosis     ICD-10-CM    1. Sepsis, due to unspecified organism, unspecified whether acute organ dysfunction present (H)  A41.9       2. Pneumonia of both lower lobes due to infectious organism  J18.9       3. Stercoral colitis  K52.89            Discharge Medications   New Prescriptions    No medications on file         DO Adrianna QUEVEDO Ferris, DO  12/07/24 0129

## 2024-12-07 NOTE — PROGRESS NOTES
MD Notification    Notified Person: MD    Notified Person Name: Jeff Del Angel, P.A.    Notification Date/Time:12/07/24 @ 0945    Notification Interaction: Vocera message     Purpose of Notification:  To discuss PICC order. IV team has unsuccessfully attempted PICC placement in past. Unable to get line to thread past the shoulder. Pt also has limited mobility in arms so is a difficult candidate for bedside PICC placement. Confirming that pt necessitates central access. Pt has 2 good working PIV's at this time.    Orders Received:   Spoke with Dr. Evans:  Hold on PICC line for now. Please try to obtain a 3rd PIV if able    Comments: Will complete PICC order for now. Please reconsult if further assistance needed.

## 2024-12-08 LAB
ALBUMIN SERPL BCG-MCNC: 3 G/DL (ref 3.5–5.2)
ALP SERPL-CCNC: 57 U/L (ref 40–150)
ALT SERPL W P-5'-P-CCNC: 36 U/L (ref 0–70)
ANION GAP SERPL CALCULATED.3IONS-SCNC: 11 MMOL/L (ref 7–15)
AST SERPL W P-5'-P-CCNC: 69 U/L (ref 0–45)
ATRIAL RATE - MUSE: 86 BPM
BILIRUB SERPL-MCNC: 0.3 MG/DL
BUN SERPL-MCNC: 12.2 MG/DL (ref 8–23)
C PNEUM DNA SPEC QL NAA+PROBE: NOT DETECTED
CA-I BLD-MCNC: 4.4 MG/DL (ref 4.4–5.2)
CALCIUM SERPL-MCNC: 8.1 MG/DL (ref 8.8–10.4)
CHLORIDE SERPL-SCNC: 113 MMOL/L (ref 98–107)
CREAT SERPL-MCNC: 0.86 MG/DL (ref 0.67–1.17)
DIASTOLIC BLOOD PRESSURE - MUSE: NORMAL MMHG
EGFRCR SERPLBLD CKD-EPI 2021: >90 ML/MIN/1.73M2
ERYTHROCYTE [DISTWIDTH] IN BLOOD BY AUTOMATED COUNT: 15.7 % (ref 10–15)
FLUAV H1 2009 PAND RNA SPEC QL NAA+PROBE: NOT DETECTED
FLUAV H1 RNA SPEC QL NAA+PROBE: NOT DETECTED
FLUAV H3 RNA SPEC QL NAA+PROBE: NOT DETECTED
FLUAV RNA SPEC QL NAA+PROBE: NOT DETECTED
FLUBV RNA SPEC QL NAA+PROBE: NOT DETECTED
GLUCOSE BLDC GLUCOMTR-MCNC: 100 MG/DL (ref 70–99)
GLUCOSE BLDC GLUCOMTR-MCNC: 121 MG/DL (ref 70–99)
GLUCOSE BLDC GLUCOMTR-MCNC: 122 MG/DL (ref 70–99)
GLUCOSE BLDC GLUCOMTR-MCNC: 147 MG/DL (ref 70–99)
GLUCOSE BLDC GLUCOMTR-MCNC: 149 MG/DL (ref 70–99)
GLUCOSE BLDC GLUCOMTR-MCNC: 152 MG/DL (ref 70–99)
GLUCOSE SERPL-MCNC: 119 MG/DL (ref 70–99)
HADV DNA SPEC QL NAA+PROBE: NOT DETECTED
HCO3 SERPL-SCNC: 26 MMOL/L (ref 22–29)
HCOV PNL SPEC NAA+PROBE: NOT DETECTED
HCT VFR BLD AUTO: 36.5 % (ref 40–53)
HGB BLD-MCNC: 11.7 G/DL (ref 13.3–17.7)
HMPV RNA SPEC QL NAA+PROBE: NOT DETECTED
HOLD SPECIMEN: NORMAL
HPIV1 RNA SPEC QL NAA+PROBE: NOT DETECTED
HPIV2 RNA SPEC QL NAA+PROBE: NOT DETECTED
HPIV3 RNA SPEC QL NAA+PROBE: NOT DETECTED
HPIV4 RNA SPEC QL NAA+PROBE: NOT DETECTED
INTERPRETATION ECG - MUSE: NORMAL
LACTATE SERPL-SCNC: 2.4 MMOL/L (ref 0.7–2)
M PNEUMO DNA SPEC QL NAA+PROBE: NOT DETECTED
MAGNESIUM SERPL-MCNC: 2.2 MG/DL (ref 1.7–2.3)
MCH RBC QN AUTO: 29 PG (ref 26.5–33)
MCHC RBC AUTO-ENTMCNC: 32.1 G/DL (ref 31.5–36.5)
MCV RBC AUTO: 90 FL (ref 78–100)
MRSA DNA SPEC QL NAA+PROBE: POSITIVE
P AXIS - MUSE: 67 DEGREES
PHOSPHATE SERPL-MCNC: 2.1 MG/DL (ref 2.5–4.5)
PLATELET # BLD AUTO: 150 10E3/UL (ref 150–450)
POTASSIUM SERPL-SCNC: 3.6 MMOL/L (ref 3.4–5.3)
PR INTERVAL - MUSE: 178 MS
PROT SERPL-MCNC: 6.1 G/DL (ref 6.4–8.3)
QRS DURATION - MUSE: 88 MS
QT - MUSE: 398 MS
QTC - MUSE: 476 MS
R AXIS - MUSE: -48 DEGREES
RBC # BLD AUTO: 4.04 10E6/UL (ref 4.4–5.9)
RSV RNA SPEC QL NAA+PROBE: NOT DETECTED
RSV RNA SPEC QL NAA+PROBE: NOT DETECTED
RV+EV RNA SPEC QL NAA+PROBE: NOT DETECTED
SA TARGET DNA: POSITIVE
SODIUM SERPL-SCNC: 150 MMOL/L (ref 135–145)
SYSTOLIC BLOOD PRESSURE - MUSE: NORMAL MMHG
T AXIS - MUSE: 36 DEGREES
VENTRICULAR RATE- MUSE: 86 BPM
WBC # BLD AUTO: 11.5 10E3/UL (ref 4–11)

## 2024-12-08 PROCEDURE — 99233 SBSQ HOSP IP/OBS HIGH 50: CPT | Performed by: HOSPITALIST

## 2024-12-08 PROCEDURE — 87633 RESP VIRUS 12-25 TARGETS: CPT | Performed by: INTERNAL MEDICINE

## 2024-12-08 PROCEDURE — 87641 MR-STAPH DNA AMP PROBE: CPT | Performed by: INTERNAL MEDICINE

## 2024-12-08 PROCEDURE — 83605 ASSAY OF LACTIC ACID: CPT | Performed by: INTERNAL MEDICINE

## 2024-12-08 PROCEDURE — 250N000013 HC RX MED GY IP 250 OP 250 PS 637: Performed by: INTERNAL MEDICINE

## 2024-12-08 PROCEDURE — 87186 SC STD MICRODIL/AGAR DIL: CPT | Performed by: INTERNAL MEDICINE

## 2024-12-08 PROCEDURE — 87205 SMEAR GRAM STAIN: CPT | Performed by: INTERNAL MEDICINE

## 2024-12-08 PROCEDURE — 258N000003 HC RX IP 258 OP 636: Performed by: INTERNAL MEDICINE

## 2024-12-08 PROCEDURE — 87077 CULTURE AEROBIC IDENTIFY: CPT | Performed by: INTERNAL MEDICINE

## 2024-12-08 PROCEDURE — 87486 CHLMYD PNEUM DNA AMP PROBE: CPT | Performed by: INTERNAL MEDICINE

## 2024-12-08 PROCEDURE — 94640 AIRWAY INHALATION TREATMENT: CPT

## 2024-12-08 PROCEDURE — 83735 ASSAY OF MAGNESIUM: CPT | Performed by: INTERNAL MEDICINE

## 2024-12-08 PROCEDURE — 250N000011 HC RX IP 250 OP 636: Performed by: INTERNAL MEDICINE

## 2024-12-08 PROCEDURE — 250N000009 HC RX 250: Performed by: INTERNAL MEDICINE

## 2024-12-08 PROCEDURE — 999N000157 HC STATISTIC RCP TIME EA 10 MIN

## 2024-12-08 PROCEDURE — 94640 AIRWAY INHALATION TREATMENT: CPT | Mod: 76

## 2024-12-08 PROCEDURE — 84155 ASSAY OF PROTEIN SERUM: CPT | Performed by: INTERNAL MEDICINE

## 2024-12-08 PROCEDURE — 82947 ASSAY GLUCOSE BLOOD QUANT: CPT | Performed by: INTERNAL MEDICINE

## 2024-12-08 PROCEDURE — 82330 ASSAY OF CALCIUM: CPT | Performed by: INTERNAL MEDICINE

## 2024-12-08 PROCEDURE — 87640 STAPH A DNA AMP PROBE: CPT | Performed by: INTERNAL MEDICINE

## 2024-12-08 PROCEDURE — 36415 COLL VENOUS BLD VENIPUNCTURE: CPT | Performed by: INTERNAL MEDICINE

## 2024-12-08 PROCEDURE — 120N000001 HC R&B MED SURG/OB

## 2024-12-08 PROCEDURE — 84100 ASSAY OF PHOSPHORUS: CPT | Performed by: INTERNAL MEDICINE

## 2024-12-08 PROCEDURE — 85018 HEMOGLOBIN: CPT | Performed by: INTERNAL MEDICINE

## 2024-12-08 PROCEDURE — 85014 HEMATOCRIT: CPT | Performed by: INTERNAL MEDICINE

## 2024-12-08 RX ADMIN — VANCOMYCIN HYDROCHLORIDE 750 MG: 500 INJECTION, POWDER, LYOPHILIZED, FOR SOLUTION INTRAVENOUS at 11:09

## 2024-12-08 RX ADMIN — HYDROCORTISONE SODIUM SUCCINATE 50 MG: 100 INJECTION, POWDER, FOR SOLUTION INTRAMUSCULAR; INTRAVENOUS at 20:08

## 2024-12-08 RX ADMIN — ALBUTEROL SULFATE 2.5 MG: 2.5 SOLUTION RESPIRATORY (INHALATION) at 11:05

## 2024-12-08 RX ADMIN — INSULIN ASPART 1 UNITS: 100 INJECTION, SOLUTION INTRAVENOUS; SUBCUTANEOUS at 00:06

## 2024-12-08 RX ADMIN — ALBUTEROL SULFATE 2.5 MG: 2.5 SOLUTION RESPIRATORY (INHALATION) at 19:30

## 2024-12-08 RX ADMIN — CARBAMAZEPINE 150 MG: 100 SUSPENSION ORAL at 11:10

## 2024-12-08 RX ADMIN — PIPERACILLIN AND TAZOBACTAM 4.5 G: 4; .5 INJECTION, POWDER, FOR SOLUTION INTRAVENOUS at 21:20

## 2024-12-08 RX ADMIN — ALBUTEROL SULFATE 2.5 MG: 2.5 SOLUTION RESPIRATORY (INHALATION) at 08:02

## 2024-12-08 RX ADMIN — CARBAMAZEPINE 150 MG: 100 SUSPENSION ORAL at 06:17

## 2024-12-08 RX ADMIN — POTASSIUM & SODIUM PHOSPHATES POWDER PACK 280-160-250 MG 1 PACKET: 280-160-250 PACK at 08:52

## 2024-12-08 RX ADMIN — PIPERACILLIN AND TAZOBACTAM 4.5 G: 4; .5 INJECTION, POWDER, FOR SOLUTION INTRAVENOUS at 09:07

## 2024-12-08 RX ADMIN — METOCLOPRAMIDE HYDROCHLORIDE 10 MG: 5 SOLUTION ORAL at 15:46

## 2024-12-08 RX ADMIN — POTASSIUM & SODIUM PHOSPHATES POWDER PACK 280-160-250 MG 1 PACKET: 280-160-250 PACK at 13:32

## 2024-12-08 RX ADMIN — PIPERACILLIN AND TAZOBACTAM 4.5 G: 4; .5 INJECTION, POWDER, FOR SOLUTION INTRAVENOUS at 04:04

## 2024-12-08 RX ADMIN — BRIVARACETAM 100 MG: 10 SOLUTION ORAL at 10:35

## 2024-12-08 RX ADMIN — ALBUTEROL SULFATE 2.5 MG: 2.5 SOLUTION RESPIRATORY (INHALATION) at 14:50

## 2024-12-08 RX ADMIN — HYDROCORTISONE SODIUM SUCCINATE 50 MG: 100 INJECTION, POWDER, FOR SOLUTION INTRAMUSCULAR; INTRAVENOUS at 04:02

## 2024-12-08 RX ADMIN — ACETYLCYSTEINE 2 ML: 200 SOLUTION ORAL; RESPIRATORY (INHALATION) at 08:02

## 2024-12-08 RX ADMIN — AZITHROMYCIN MONOHYDRATE 250 MG: 500 INJECTION, POWDER, LYOPHILIZED, FOR SOLUTION INTRAVENOUS at 04:43

## 2024-12-08 RX ADMIN — BRIVARACETAM 100 MG: 10 SOLUTION ORAL at 21:20

## 2024-12-08 RX ADMIN — VANCOMYCIN HYDROCHLORIDE 750 MG: 500 INJECTION, POWDER, LYOPHILIZED, FOR SOLUTION INTRAVENOUS at 22:39

## 2024-12-08 RX ADMIN — ACETAMINOPHEN 650 MG: 325 SUSPENSION ORAL at 20:09

## 2024-12-08 RX ADMIN — PANTOPRAZOLE SODIUM 40 MG: 40 INJECTION, POWDER, FOR SOLUTION INTRAVENOUS at 08:52

## 2024-12-08 RX ADMIN — Medication 15 ML: at 08:51

## 2024-12-08 RX ADMIN — METOCLOPRAMIDE HYDROCHLORIDE 10 MG: 5 SOLUTION ORAL at 20:08

## 2024-12-08 RX ADMIN — LEVOTHYROXINE SODIUM 88 MCG: 88 TABLET ORAL at 08:51

## 2024-12-08 RX ADMIN — METOCLOPRAMIDE HYDROCHLORIDE 10 MG: 5 SOLUTION ORAL at 08:54

## 2024-12-08 RX ADMIN — Medication 1 PACKET: at 08:52

## 2024-12-08 RX ADMIN — PIPERACILLIN AND TAZOBACTAM 4.5 G: 4; .5 INJECTION, POWDER, FOR SOLUTION INTRAVENOUS at 15:46

## 2024-12-08 RX ADMIN — POTASSIUM & SODIUM PHOSPHATES POWDER PACK 280-160-250 MG 1 PACKET: 280-160-250 PACK at 16:42

## 2024-12-08 RX ADMIN — ENOXAPARIN SODIUM 40 MG: 40 INJECTION SUBCUTANEOUS at 08:52

## 2024-12-08 RX ADMIN — INSULIN ASPART 1 UNITS: 100 INJECTION, SOLUTION INTRAVENOUS; SUBCUTANEOUS at 20:19

## 2024-12-08 RX ADMIN — PANTOPRAZOLE SODIUM 40 MG: 40 INJECTION, POWDER, FOR SOLUTION INTRAVENOUS at 20:09

## 2024-12-08 RX ADMIN — HYDROCORTISONE SODIUM SUCCINATE 50 MG: 100 INJECTION, POWDER, FOR SOLUTION INTRAMUSCULAR; INTRAVENOUS at 11:10

## 2024-12-08 RX ADMIN — CARBAMAZEPINE 100 MG: 100 SUSPENSION ORAL at 21:20

## 2024-12-08 RX ADMIN — INSULIN ASPART 1 UNITS: 100 INJECTION, SOLUTION INTRAVENOUS; SUBCUTANEOUS at 15:52

## 2024-12-08 RX ADMIN — ACETYLCYSTEINE 2 ML: 200 SOLUTION ORAL; RESPIRATORY (INHALATION) at 19:30

## 2024-12-08 RX ADMIN — ACETYLCYSTEINE 2 ML: 200 SOLUTION ORAL; RESPIRATORY (INHALATION) at 11:05

## 2024-12-08 RX ADMIN — CARBAMAZEPINE 150 MG: 100 SUSPENSION ORAL at 00:06

## 2024-12-08 RX ADMIN — ACETYLCYSTEINE 2 ML: 200 SOLUTION ORAL; RESPIRATORY (INHALATION) at 14:50

## 2024-12-08 RX ADMIN — METOCLOPRAMIDE HYDROCHLORIDE 10 MG: 5 SOLUTION ORAL at 11:12

## 2024-12-08 ASSESSMENT — ACTIVITIES OF DAILY LIVING (ADL)
ADLS_ACUITY_SCORE: 87
ADLS_ACUITY_SCORE: 92
ADLS_ACUITY_SCORE: 75
ADLS_ACUITY_SCORE: 79
ADLS_ACUITY_SCORE: 75
ADLS_ACUITY_SCORE: 75
ADLS_ACUITY_SCORE: 92
ADLS_ACUITY_SCORE: 87
ADLS_ACUITY_SCORE: 87
ADLS_ACUITY_SCORE: 92
ADLS_ACUITY_SCORE: 75
ADLS_ACUITY_SCORE: 79
ADLS_ACUITY_SCORE: 87
ADLS_ACUITY_SCORE: 87
ADLS_ACUITY_SCORE: 75
ADLS_ACUITY_SCORE: 87
ADLS_ACUITY_SCORE: 79
ADLS_ACUITY_SCORE: 87
ADLS_ACUITY_SCORE: 79
ADLS_ACUITY_SCORE: 79
ADLS_ACUITY_SCORE: 87
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
## 2024-12-08 NOTE — PLAN OF CARE
Goal Outcome Evaluation:  Plan of Care Reviewed With: patient  Overall Patient Progress: improving  Outcome Evaluation: Patient alert and awake most of the night. He follows commands. He is irritable, combative, attempts to hit staff and is difficult to redirect at times. He remains afebrile. VSS on room air. External catheter in use with good urine output.    Problem: Adult Inpatient Plan of Care  Goal: Optimal Comfort and Wellbeing  Outcome: Progressing  Intervention: Provide Person-Centered Care  Recent Flowsheet Documentation  Taken 12/8/2024 0400 by Ila Cardoso RN  Trust Relationship/Rapport:   care explained   choices provided   reassurance provided  Taken 12/8/2024 0000 by Ila Cardoso RN  Trust Relationship/Rapport:   care explained   choices provided   reassurance provided  Taken 12/7/2024 2000 by Ila Cardoso RN  Trust Relationship/Rapport:   care explained   choices provided   reassurance provided

## 2024-12-08 NOTE — PLAN OF CARE
Goal Outcome Evaluation:      Plan of Care Reviewed With: patient, guardian    Overall Patient Progress: improvingOverall Patient Progress: improving    Outcome Evaluation: Pt transferred to [facility] at 1645 with all his belongings. Attempted to call momSavannah, but no answer. LS diminished. On RA. SR. BP WNL. External catheter in place with adequate UOP. Watery BMx2 this shift. On TF via PEG. BS WNL. PIVx1 SL on LUE. Afebrile. Up to chair with lift. Will continue to monitor and support.      Problem: Infection  Goal: Absence of Infection Signs and Symptoms  Outcome: Adequate for Care Transition  Intervention: Prevent or Manage Infection  Recent Flowsheet Documentation  Taken 12/8/2024 0800 by Tara Cuevas, RN  Infection Management: aseptic technique maintained  Isolation Precautions: contact precautions maintained

## 2024-12-08 NOTE — PLAN OF CARE
Assumed care of patient at 1600, stopped levo at 1600 MAP 70-80s. LS coarse sats 97% on RA. BM loose, large x 2.T max 99.1

## 2024-12-08 NOTE — PROGRESS NOTES
Brief ICU progress note:    Pt off vasopressors since yesterday afternoon.   On RA.  Signout provided to hospital team (Dr. Evans resuming care as primary provider).    Dominic Boggs MD  [facility],  of Medicine  Pulmonary/Critical Care Medicine  December 8, 2024

## 2024-12-09 LAB
ALBUMIN SERPL BCG-MCNC: 2.9 G/DL (ref 3.5–5.2)
ALP SERPL-CCNC: 66 U/L (ref 40–150)
ALT SERPL W P-5'-P-CCNC: 47 U/L (ref 0–70)
ANION GAP SERPL CALCULATED.3IONS-SCNC: 10 MMOL/L (ref 7–15)
AST SERPL W P-5'-P-CCNC: 96 U/L (ref 0–45)
BILIRUB SERPL-MCNC: 0.2 MG/DL
BUN SERPL-MCNC: 11.6 MG/DL (ref 8–23)
CA-I BLD-MCNC: 4.3 MG/DL (ref 4.4–5.2)
CALCIUM SERPL-MCNC: 7.7 MG/DL (ref 8.8–10.4)
CHLORIDE SERPL-SCNC: 119 MMOL/L (ref 98–107)
CREAT SERPL-MCNC: 0.86 MG/DL (ref 0.67–1.17)
CREAT SERPL-MCNC: 0.87 MG/DL (ref 0.67–1.17)
EGFRCR SERPLBLD CKD-EPI 2021: >90 ML/MIN/1.73M2
EGFRCR SERPLBLD CKD-EPI 2021: >90 ML/MIN/1.73M2
ERYTHROCYTE [DISTWIDTH] IN BLOOD BY AUTOMATED COUNT: 16.1 % (ref 10–15)
GLUCOSE BLDC GLUCOMTR-MCNC: 139 MG/DL (ref 70–99)
GLUCOSE BLDC GLUCOMTR-MCNC: 149 MG/DL (ref 70–99)
GLUCOSE BLDC GLUCOMTR-MCNC: 156 MG/DL (ref 70–99)
GLUCOSE BLDC GLUCOMTR-MCNC: 157 MG/DL (ref 70–99)
GLUCOSE BLDC GLUCOMTR-MCNC: 159 MG/DL (ref 70–99)
GLUCOSE BLDC GLUCOMTR-MCNC: 169 MG/DL (ref 70–99)
GLUCOSE BLDC GLUCOMTR-MCNC: 197 MG/DL (ref 70–99)
GLUCOSE SERPL-MCNC: 135 MG/DL (ref 70–99)
HCO3 SERPL-SCNC: 25 MMOL/L (ref 22–29)
HCT VFR BLD AUTO: 34.1 % (ref 40–53)
HGB BLD-MCNC: 10.5 G/DL (ref 13.3–17.7)
MAGNESIUM SERPL-MCNC: 2.2 MG/DL (ref 1.7–2.3)
MCH RBC QN AUTO: 28.4 PG (ref 26.5–33)
MCHC RBC AUTO-ENTMCNC: 30.8 G/DL (ref 31.5–36.5)
MCV RBC AUTO: 92 FL (ref 78–100)
PHOSPHATE SERPL-MCNC: 1.8 MG/DL (ref 2.5–4.5)
PHOSPHATE SERPL-MCNC: 2.3 MG/DL (ref 2.5–4.5)
PLATELET # BLD AUTO: 158 10E3/UL (ref 150–450)
PLATELET # BLD AUTO: 178 10E3/UL (ref 150–450)
POTASSIUM SERPL-SCNC: 3.3 MMOL/L (ref 3.4–5.3)
POTASSIUM SERPL-SCNC: 3.5 MMOL/L (ref 3.4–5.3)
PROT SERPL-MCNC: 5.6 G/DL (ref 6.4–8.3)
RBC # BLD AUTO: 3.7 10E6/UL (ref 4.4–5.9)
SODIUM SERPL-SCNC: 154 MMOL/L (ref 135–145)
VANCOMYCIN SERPL-MCNC: 16.9 UG/ML
WBC # BLD AUTO: 10 10E3/UL (ref 4–11)

## 2024-12-09 PROCEDURE — 83735 ASSAY OF MAGNESIUM: CPT | Performed by: INTERNAL MEDICINE

## 2024-12-09 PROCEDURE — 258N000003 HC RX IP 258 OP 636: Performed by: INTERNAL MEDICINE

## 2024-12-09 PROCEDURE — 82947 ASSAY GLUCOSE BLOOD QUANT: CPT | Performed by: INTERNAL MEDICINE

## 2024-12-09 PROCEDURE — 999N000157 HC STATISTIC RCP TIME EA 10 MIN

## 2024-12-09 PROCEDURE — 250N000011 HC RX IP 250 OP 636: Performed by: INTERNAL MEDICINE

## 2024-12-09 PROCEDURE — 94640 AIRWAY INHALATION TREATMENT: CPT

## 2024-12-09 PROCEDURE — 82565 ASSAY OF CREATININE: CPT | Performed by: INTERNAL MEDICINE

## 2024-12-09 PROCEDURE — 94640 AIRWAY INHALATION TREATMENT: CPT | Mod: 76

## 2024-12-09 PROCEDURE — 250N000009 HC RX 250: Performed by: INTERNAL MEDICINE

## 2024-12-09 PROCEDURE — 85049 AUTOMATED PLATELET COUNT: CPT | Performed by: INTERNAL MEDICINE

## 2024-12-09 PROCEDURE — 84100 ASSAY OF PHOSPHORUS: CPT | Performed by: HOSPITALIST

## 2024-12-09 PROCEDURE — 80202 ASSAY OF VANCOMYCIN: CPT | Performed by: HOSPITALIST

## 2024-12-09 PROCEDURE — 84450 TRANSFERASE (AST) (SGOT): CPT | Performed by: INTERNAL MEDICINE

## 2024-12-09 PROCEDURE — 85041 AUTOMATED RBC COUNT: CPT | Performed by: INTERNAL MEDICINE

## 2024-12-09 PROCEDURE — 36415 COLL VENOUS BLD VENIPUNCTURE: CPT | Performed by: INTERNAL MEDICINE

## 2024-12-09 PROCEDURE — 120N000001 HC R&B MED SURG/OB

## 2024-12-09 PROCEDURE — 250N000013 HC RX MED GY IP 250 OP 250 PS 637: Performed by: HOSPITALIST

## 2024-12-09 PROCEDURE — 82330 ASSAY OF CALCIUM: CPT | Performed by: INTERNAL MEDICINE

## 2024-12-09 PROCEDURE — G0463 HOSPITAL OUTPT CLINIC VISIT: HCPCS

## 2024-12-09 PROCEDURE — 99232 SBSQ HOSP IP/OBS MODERATE 35: CPT | Performed by: HOSPITALIST

## 2024-12-09 PROCEDURE — 84132 ASSAY OF SERUM POTASSIUM: CPT | Mod: XU | Performed by: HOSPITALIST

## 2024-12-09 PROCEDURE — 84100 ASSAY OF PHOSPHORUS: CPT | Performed by: INTERNAL MEDICINE

## 2024-12-09 PROCEDURE — 36415 COLL VENOUS BLD VENIPUNCTURE: CPT | Performed by: HOSPITALIST

## 2024-12-09 PROCEDURE — 250N000013 HC RX MED GY IP 250 OP 250 PS 637: Performed by: INTERNAL MEDICINE

## 2024-12-09 RX ORDER — POTASSIUM CHLORIDE 1.5 G/1.58G
40 POWDER, FOR SOLUTION ORAL ONCE
Status: COMPLETED | OUTPATIENT
Start: 2024-12-09 | End: 2024-12-09

## 2024-12-09 RX ORDER — BENZOCAINE/MENTHOL 6 MG-10 MG
LOZENGE MUCOUS MEMBRANE 2 TIMES DAILY
Status: DISCONTINUED | OUTPATIENT
Start: 2024-12-09 | End: 2024-12-13 | Stop reason: HOSPADM

## 2024-12-09 RX ADMIN — INSULIN ASPART 1 UNITS: 100 INJECTION, SOLUTION INTRAVENOUS; SUBCUTANEOUS at 16:01

## 2024-12-09 RX ADMIN — POTASSIUM & SODIUM PHOSPHATES POWDER PACK 280-160-250 MG 2 PACKET: 280-160-250 PACK at 08:46

## 2024-12-09 RX ADMIN — ENOXAPARIN SODIUM 40 MG: 40 INJECTION SUBCUTANEOUS at 08:46

## 2024-12-09 RX ADMIN — HYDROCORTISONE: 1 CREAM TOPICAL at 21:51

## 2024-12-09 RX ADMIN — METOCLOPRAMIDE HYDROCHLORIDE 10 MG: 5 SOLUTION ORAL at 12:35

## 2024-12-09 RX ADMIN — METOCLOPRAMIDE HYDROCHLORIDE 10 MG: 5 SOLUTION ORAL at 08:46

## 2024-12-09 RX ADMIN — INSULIN ASPART 2 UNITS: 100 INJECTION, SOLUTION INTRAVENOUS; SUBCUTANEOUS at 21:50

## 2024-12-09 RX ADMIN — HYDROCORTISONE: 1 CREAM TOPICAL at 12:36

## 2024-12-09 RX ADMIN — ACETYLCYSTEINE 2 ML: 200 SOLUTION ORAL; RESPIRATORY (INHALATION) at 16:13

## 2024-12-09 RX ADMIN — ALBUTEROL SULFATE 2.5 MG: 2.5 SOLUTION RESPIRATORY (INHALATION) at 16:13

## 2024-12-09 RX ADMIN — POTASSIUM CHLORIDE 40 MEQ: 1.5 POWDER, FOR SOLUTION ORAL at 08:46

## 2024-12-09 RX ADMIN — CARBAMAZEPINE 150 MG: 100 SUSPENSION ORAL at 00:43

## 2024-12-09 RX ADMIN — Medication 1 PACKET: at 22:39

## 2024-12-09 RX ADMIN — HYDROCORTISONE SODIUM SUCCINATE 50 MG: 100 INJECTION, POWDER, FOR SOLUTION INTRAMUSCULAR; INTRAVENOUS at 04:47

## 2024-12-09 RX ADMIN — CARBAMAZEPINE 150 MG: 100 SUSPENSION ORAL at 12:34

## 2024-12-09 RX ADMIN — INSULIN ASPART 1 UNITS: 100 INJECTION, SOLUTION INTRAVENOUS; SUBCUTANEOUS at 08:47

## 2024-12-09 RX ADMIN — VANCOMYCIN HYDROCHLORIDE 750 MG: 500 INJECTION, POWDER, LYOPHILIZED, FOR SOLUTION INTRAVENOUS at 11:01

## 2024-12-09 RX ADMIN — METOCLOPRAMIDE HYDROCHLORIDE 10 MG: 5 SOLUTION ORAL at 21:49

## 2024-12-09 RX ADMIN — ACETYLCYSTEINE 2 ML: 200 SOLUTION ORAL; RESPIRATORY (INHALATION) at 11:46

## 2024-12-09 RX ADMIN — ACETYLCYSTEINE 2 ML: 200 SOLUTION ORAL; RESPIRATORY (INHALATION) at 19:25

## 2024-12-09 RX ADMIN — BACITRACIN: 500 OINTMENT TOPICAL at 08:48

## 2024-12-09 RX ADMIN — POTASSIUM & SODIUM PHOSPHATES POWDER PACK 280-160-250 MG 2 PACKET: 280-160-250 PACK at 16:00

## 2024-12-09 RX ADMIN — BRIVARACETAM 100 MG: 10 SOLUTION ORAL at 21:49

## 2024-12-09 RX ADMIN — METOCLOPRAMIDE HYDROCHLORIDE 10 MG: 5 SOLUTION ORAL at 16:00

## 2024-12-09 RX ADMIN — POTASSIUM & SODIUM PHOSPHATES POWDER PACK 280-160-250 MG 2 PACKET: 280-160-250 PACK at 12:35

## 2024-12-09 RX ADMIN — PIPERACILLIN AND TAZOBACTAM 4.5 G: 4; .5 INJECTION, POWDER, FOR SOLUTION INTRAVENOUS at 04:11

## 2024-12-09 RX ADMIN — BACITRACIN: 500 OINTMENT TOPICAL at 21:49

## 2024-12-09 RX ADMIN — HYDROCORTISONE SODIUM SUCCINATE 50 MG: 100 INJECTION, POWDER, FOR SOLUTION INTRAMUSCULAR; INTRAVENOUS at 16:00

## 2024-12-09 RX ADMIN — CARBAMAZEPINE 100 MG: 100 SUSPENSION ORAL at 22:01

## 2024-12-09 RX ADMIN — PIPERACILLIN AND TAZOBACTAM 4.5 G: 4; .5 INJECTION, POWDER, FOR SOLUTION INTRAVENOUS at 10:20

## 2024-12-09 RX ADMIN — AZITHROMYCIN MONOHYDRATE 250 MG: 500 INJECTION, POWDER, LYOPHILIZED, FOR SOLUTION INTRAVENOUS at 04:48

## 2024-12-09 RX ADMIN — PANTOPRAZOLE SODIUM 40 MG: 40 INJECTION, POWDER, FOR SOLUTION INTRAVENOUS at 21:51

## 2024-12-09 RX ADMIN — LEVOTHYROXINE SODIUM 88 MCG: 88 TABLET ORAL at 06:31

## 2024-12-09 RX ADMIN — ALBUTEROL SULFATE 2.5 MG: 2.5 SOLUTION RESPIRATORY (INHALATION) at 11:47

## 2024-12-09 RX ADMIN — BRIVARACETAM 100 MG: 10 SOLUTION ORAL at 10:20

## 2024-12-09 RX ADMIN — ALBUTEROL SULFATE 2.5 MG: 2.5 SOLUTION RESPIRATORY (INHALATION) at 08:15

## 2024-12-09 RX ADMIN — INSULIN ASPART 1 UNITS: 100 INJECTION, SOLUTION INTRAVENOUS; SUBCUTANEOUS at 12:37

## 2024-12-09 RX ADMIN — ACETYLCYSTEINE 2 ML: 200 SOLUTION ORAL; RESPIRATORY (INHALATION) at 08:14

## 2024-12-09 RX ADMIN — PIPERACILLIN AND TAZOBACTAM 4.5 G: 4; .5 INJECTION, POWDER, FOR SOLUTION INTRAVENOUS at 16:00

## 2024-12-09 RX ADMIN — VANCOMYCIN HYDROCHLORIDE 750 MG: 500 INJECTION, POWDER, LYOPHILIZED, FOR SOLUTION INTRAVENOUS at 22:39

## 2024-12-09 RX ADMIN — INSULIN ASPART 1 UNITS: 100 INJECTION, SOLUTION INTRAVENOUS; SUBCUTANEOUS at 00:43

## 2024-12-09 RX ADMIN — ALBUTEROL SULFATE 2.5 MG: 2.5 SOLUTION RESPIRATORY (INHALATION) at 19:25

## 2024-12-09 RX ADMIN — Medication 15 ML: at 08:47

## 2024-12-09 RX ADMIN — CARBAMAZEPINE 150 MG: 100 SUSPENSION ORAL at 06:31

## 2024-12-09 RX ADMIN — PIPERACILLIN AND TAZOBACTAM 4.5 G: 4; .5 INJECTION, POWDER, FOR SOLUTION INTRAVENOUS at 21:49

## 2024-12-09 RX ADMIN — PANTOPRAZOLE SODIUM 40 MG: 40 INJECTION, POWDER, FOR SOLUTION INTRAVENOUS at 08:46

## 2024-12-09 ASSESSMENT — ACTIVITIES OF DAILY LIVING (ADL)
ADLS_ACUITY_SCORE: 89
ADLS_ACUITY_SCORE: 89
ADLS_ACUITY_SCORE: 87
ADLS_ACUITY_SCORE: 89
ADLS_ACUITY_SCORE: 87
ADLS_ACUITY_SCORE: 89
ADLS_ACUITY_SCORE: 87
ADLS_ACUITY_SCORE: 89
ADLS_ACUITY_SCORE: 87
ADLS_ACUITY_SCORE: 91
ADLS_ACUITY_SCORE: 87
ADLS_ACUITY_SCORE: 91
ADLS_ACUITY_SCORE: 89

## 2024-12-09 NOTE — PHARMACY-VANCOMYCIN DOSING SERVICE
"Pharmacy Vancomycin Note  Date of Service 2024  Patient's  1960   64 year old, male  TBW = 76.2 kg    Indication: Sepsis  Day of Therapy: 3 (started 2024)  Current vancomycin regimen:  750 mg IV q12h  Current vancomycin monitoring method: AUC  Current vancomycin therapeutic monitoring goal: 400-600 mg*h/L    InsightRX Prediction of Current Vancomycin Regimen  Regimen: 750 mg IV every 12 hours.  Start time: 10:39 on 2024  Exposure target: AUC24 (range)400-600 mg/L.hr   AUC24,ss: 454 mg/L.hr  Probability of AUC24 > 400: 80 %  Ctrough,ss: 14.7 mg/L  Probability of Ctrough,ss > 20: 7 %  Probability of nephrotoxicity (Lodise ERNESTO ): 10 %      Current estimated CrCl = Estimated Creatinine Clearance: 96 mL/min (based on SCr of 0.86 mg/dL).    Creatinine for last 3 days  2024:  8:03 PM Creatinine 0.97 mg/dL  2024:  4:07 AM Creatinine 1.05 mg/dL  2024:  4:41 AM Creatinine 0.86 mg/dL  2024:  6:45 AM Creatinine 0.86 mg/dL    Recent Vancomycin Levels (past 3 days)  2024:  6:45 AM Vancomycin 16.9 ug/mL    Vancomycin IV Administrations (past 72 hours)                     vancomycin (VANCOCIN) 750 mg in sodium chloride 0.9 % 282.5 mL intermittent infusion (mg) 750 mg New Bag 24 2239     750 mg New Bag  1109     750 mg New Bag 24 2225    vancomycin (VANCOCIN) 1,500 mg in 0.9% NaCl 265 mL intermittent infusion (mg) 1,500 mg New Bag 24 1033                    Nephrotoxins and other renal medications (From now, onward)      Start     Dose/Rate Route Frequency Ordered Stop    24 223  vancomycin (VANCOCIN) 750 mg in sodium chloride 0.9 % 282.5 mL intermittent infusion         750 mg  over 120 Minutes Intravenous EVERY 12 HOURS 24 0929      24 0400  piperacillin-tazobactam (ZOSYN) 4.5 g vial to attach to  mL bag        Note to Pharmacy: For [facility], [facility] and [facility]: For Zosyn-naive patients, use the \"Zosyn initial dose + extended infusion\" order " panel.    4.5 g  over 30 Minutes Intravenous EVERY 6 HOURS 12/07/24 0204 12/14/24 0359               Contrast Orders - past 72 hours (72h ago, onward)      Start     Dose/Rate Route Frequency Stop    12/06/24 2240  iopamidol (ISOVUE-370) solution 75 mL         75 mL Intravenous ONCE 12/06/24 2249            Interpretation of levels and current regimen:  Vancomycin level is reflective of -600    Has serum creatinine changed greater than 50% in last 72 hours: No    Urine output:  good urine output    Renal Function: Stable    Plan:  Continue Current Dose  Vancomycin monitoring method: AUC  Vancomycin therapeutic monitoring goal: 400-600 mg*h/L  Pharmacy will check vancomycin levels as appropriate in 3-5 Days.  Serum creatinine levels will be ordered a minimum of twice weekly.    Silvia Ram, JustinD

## 2024-12-09 NOTE — PLAN OF CARE
Summary- Hx TBI, frequent admissions with aspiration pneumonia  Orientation: Alert, HECTOR orientation nonverbal, calm and cooperative. Smiling a lot and interactive this shift. Can give thumbs up/down and understands basic commands  Aggression Stop Light: Green   Activity: Total care, turn and repo q2, HOB at least 30 degrees for tube feeding. Right sided hemiparesis baseline  Diet/BS Checks: Strict NPO, tube feeding running through J tube at 70mL/hr.(Goal rate). FWF programmed at 200mL q4h (increased today for hypernatremia).   ABNL Labs: Q4 , 159. Phos 1.8- replaced and recheck at 2030, K 3.3- replaced and recheck 3.5, Na 154 (sodium tablet on hold). Mg WNL- recheck in AM  IV Access/Drains: PIV with intermittent abx; GJ tube running TF  Pain Management: denies- looks comfortable  Abnormal VS/Results: VSS on RA  Bowel/Bladder: Incontinent of bowel/bladder, no BM yet today but had been very loose/several a day  Skin/Wounds: Scattered bruises, PI on sacrum scabbed/red (clean with sarah/apply barrier cream and leave ANANYA per WOC); scattered scabs including R 2nd toe. +1 bilateral foot and BUE edema.   Consults: WOC, social work, nutrition consult  D/C Disposition: possibly tomorrow pending labs back to home  Other Info: mom updated bedside today. Bacitracin and gauze applied under PEJ tube. Continues on IV abx and nebs. ID consulted

## 2024-12-09 NOTE — PLAN OF CARE
Summary- Hx TBI, frequent admissions with aspiration pneumonia  Orientation: Alert, HECTOR orientation nonverbal, calm and cooperative.  Aggression Stop Light: Green   Activity: Total care, turn and repo q2, HOB at least 30 degrees for tube feeding  Diet/BS Checks: Strict NPO, tube feeding running through GJ tube at 70mL/hr.(Goal rate). FWF programmed at 75mL q2h  ABNL Labs: Q4 ,139. Lactic 2.4 (MD aware), Na 150 (sodium tablet on hold)  IV Access/Drains: LPIV SL with intermittent abx  GJ tube running TF  Pain Management: No non verbal sign present this shift ex tenderness of coccyx w/ incontinence cares   Abnormal VS/Results: VSS on RA  Bowel/Bladder: Incontinent of bowel/bladder; 2 loose/watery stools this shift    Skin/Wounds: Scattered bruises, PI on sacrum (mepilex removed at this time due to loose incontinent stools, barrier cream applied), redness blanchable sacrum (barrier cream applied), scattered scabs including R 2nd toe  Consults: WOC social work, nutrition consult  D/C Disposition: pending  Other Info: slight contraction of RUE. Perineal/scrotal rash- barrier cream applied. Pt scratching at scrotum possibly itching. Mom states that she puts hydrocortisone cream on it at home- will inquire about this tomorrow with MD.
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
## 2024-12-09 NOTE — CONSULTS
[facility] Nurse Inpatient Assessment     Consulted for:  PEG site irritation    Summary:    PEG site: mild redness and irritation, likely from moisture with possible pressure component.   Coccyx/ memo area: superficial scabbing and peeling, likely a mix of pressure, friction, and moisture from incontinence.    Patient History (according to provider note(s):      Keyon Farias is a 64 year old male with a history of TBI and panhypopituitarism, prediabetes, chronic aspiration with G-tube dependence admitted on 12/6/2024 with severe sepsis secondary to recurrent aspiration event and associated acute hypoxic respiratory failure. History of similar presentations in the past.     Areas Assessed:      Areas visualized during today's visit: Sacrum/coccyx and Abdomen    Wound location: PEG tube site    Last photo: 12-9-24      Wound due to: Moisture Associated Skin Damage (MASD), possible pressure component  Wound history/plan of care: pt has chronic PEG tube.  He does not like people touching it.   Wound base: red semi-moist tissue     Palpation of the wound bed: normal      Drainage: scant     Description of drainage: serosanguinous     Measurements (length x width x depth, in cm): patchy areas under the bumper  Periwound skin: Denuded and Erythema- blanchable      Color: pink but clear outside of the bumper      Temperature: normal   Odor: none  Pain: facial expression of distress, tender  Pain interventions prior to dressing change: slow and gentle cares   Treatment goal: Heal , Infection control/prevention, Decrease moisture, and Protection  STATUS: initial assessment  Supplies ordered: supplies stored on unit      Wound location: Coccyx area    Last photo: 12-9-24      Wound due to: Friction and Incontinence Associated Dermatitis (IAD) and possible pressure component  Wound history/plan of care: pt has history of chronic skin issues to area.  Pt is total cares and incontinent of  bowel/bladder.   Wound base: thin red scabbing, peeling/flaking      Palpation of the wound bed: normal      Drainage: none     Description of drainage: none     Measurements (length x width x depth, in cm): approx 6 x 5cm area, cluster measure, no real depth  Periwound skin: Dry/scaly and Erythema- blanchable      Color: pink      Temperature: normal   Odor: none  Pain: mild, tender  Pain interventions prior to dressing change: slow and gentle cares   Treatment goal: Heal , Decrease moisture, and Protection  STATUS: initial assessment  Supplies ordered: supplies stored on unit       Treatment Plan:     PEG tube site: BID and prn:  Cleanse with Vashe-moist gauze.  Dry well.  Apply very thin glaze of barrier paste if skin is raw/irritated.   Place drain sponge under bumper.     Coccyx/ perineal cares: BID and prn:  Cleanse with Rita perineal lotion and soft dry wipes.   Apply thin glaze of barrier paste to any wounds and perianal area.  Leave open to air.   PIP measures.   Add ZULY pump to mattress to help with moisture issues.     Pressure Injury Prevention (PIP) Plan:  -Mattress: Add ZULY pump to mattress PRN moisture issues  -HOB: Maintain at or below 30 degrees, unless contraindicated  -Repositioning in bed: Every 1-2 hours , Left/right positioning; avoid supine, and Raise foot of bed prior to raising head of bed, to reduce patient sliding down (shear)  -Heels: Keep elevated off mattress and Pillows under calves  -Protective Dressing: None  -Moisture Management: Perineal cleansing /protection: Follow Incontinence Protocol  -Under Devices: Inspect skin under all medical devices during skin inspection , Ensure tubes are stabilized without tension, and Ensure patient is not lying on medical devices or equipment when repositioned        Orders: Written    RECOMMEND PRIMARY TEAM ORDER: None, at this time  Education provided: plan of care  Discussed plan of care with: Patient, Family, and Nurse  [facility] nurse follow-up plan:  weekly  Notify [facility] if wound(s) deteriorate.  Nursing to notify the Provider(s) and re-consult the [facility] Nurse if new skin concern.    DATA:     Current support surface: Standard  Standard gel mattress (Isoflex)  Containment of urine/stool: Incontinence Protocol and Brief  BMI: Body mass index is 24.1 kg/m .   Active diet order: Orders Placed This Encounter      NPO for Medical/Clinical Reasons Except for: Meds     Output: I/O last 3 completed shifts:  In: 3191 [I.V.:1025; NG/GT:2046]  Out: 800 [Urine:800]     Labs:   Recent Labs   Lab 12/09/24  0645 12/08/24  0441 12/07/24  0407   ALBUMIN 2.9*   < >  --    HGB 10.5*   < > 13.2*   WBC 10.0   < > 18.9*   A1C  --   --  5.6    < > = values in this interval not displayed.     Pressure injury risk assessment:   Sensory Perception: 3-->slightly limited  Moisture: 3-->occasionally moist  Activity: 2-->chairfast  Mobility: 2-->very limited  Nutrition: 3-->adequate  Friction and Shear: 2-->potential problem  Ricci Score: 15    Kristie Norris RN CWOCN  -Securely message with [employer] [employer])  Preferred  -[facility] Office Phone: [phone] (messages checked periodically Mon-Fri 8a-4p)

## 2024-12-09 NOTE — PROGRESS NOTES
CLINICAL NUTRITION SERVICES - BRIEF NOTE    - Chart check on nutrition support patient  - Na high at 154  - Discussed with MD via joseph, will increase FWF to 200 mL q 4 hours  - Phos 1.8 (L), K 3.3 (L) - both getting replaced    Sun Abad RD, LD  Clinical Dietitian - [facility]

## 2024-12-09 NOTE — PLAN OF CARE
Summary- Hx TBI, frequent admissions with aspiration pneumonia  Orientation: Alert, HECTOR orientation nonverbal, calm and cooperative. Smiling a lot and interactive this shift  Aggression Stop Light: Green since admission to this unit.  Activity: Total care, turn and repo q2, HOB at least 30 degrees for tube feeding  Diet/BS Checks: Strict NPO, tube feeding running through GJ tube at 70mL/hr.(Goal rate). FWF programmed at 75mL q2h  ABNL Labs: Q4 . Lactic 2.4 (MD aware), Na 150 (sodium tablet on hold)  IV Access/Drains: PIV with intermittent abx and boluses, GJ tube running TF  Pain Management: denies- looks comfortable. Given tylenol x1 r/t tenderness of coccyx  Abnormal VS/Results: VSS on RA  Bowel/Bladder: Incontinent of bowel/bladder; external catheter changed x2 this shift due to incontinent loose/watery stools. Removed for now.    Skin/Wounds: Scattered bruises, PI on sacrum (mepilex removed at this time due to loose incontinent stools, barrier cream applied), redness blanchable sacrum (barrier cream applied), scattered scabs including R 2nd toe  Consults: WOC social work, nutrition consult  D/C Disposition: pending  Other Info: slight contraction of RUE. Perineal/scrotal rash- barrier cream applied. Pt scratching at scrotum/pulling off external catheter when applied- possibly itching. Mom states that she puts hydrocortisone cream on it at home- will inquire about this tomorrow with MD.

## 2024-12-10 LAB
ALBUMIN SERPL BCG-MCNC: 3.3 G/DL (ref 3.5–5.2)
ALP SERPL-CCNC: ABNORMAL U/L
ALT SERPL W P-5'-P-CCNC: ABNORMAL U/L
ANION GAP SERPL CALCULATED.3IONS-SCNC: 8 MMOL/L (ref 7–15)
AST SERPL W P-5'-P-CCNC: ABNORMAL U/L
BACTERIA SPEC CULT: ABNORMAL
BILIRUB SERPL-MCNC: 0.2 MG/DL
BUN SERPL-MCNC: 12.5 MG/DL (ref 8–23)
CA-I BLD-MCNC: 4.3 MG/DL (ref 4.4–5.2)
CALCIUM SERPL-MCNC: 8.2 MG/DL (ref 8.8–10.4)
CHLORIDE SERPL-SCNC: 117 MMOL/L (ref 98–107)
CREAT SERPL-MCNC: 0.81 MG/DL (ref 0.67–1.17)
EGFRCR SERPLBLD CKD-EPI 2021: >90 ML/MIN/1.73M2
ERYTHROCYTE [DISTWIDTH] IN BLOOD BY AUTOMATED COUNT: 16.6 % (ref 10–15)
GLUCOSE BLDC GLUCOMTR-MCNC: 114 MG/DL (ref 70–99)
GLUCOSE BLDC GLUCOMTR-MCNC: 115 MG/DL (ref 70–99)
GLUCOSE BLDC GLUCOMTR-MCNC: 137 MG/DL (ref 70–99)
GLUCOSE BLDC GLUCOMTR-MCNC: 158 MG/DL (ref 70–99)
GLUCOSE BLDC GLUCOMTR-MCNC: 221 MG/DL (ref 70–99)
GLUCOSE SERPL-MCNC: 103 MG/DL (ref 70–99)
HCO3 SERPL-SCNC: 31 MMOL/L (ref 22–29)
HCT VFR BLD AUTO: 38.5 % (ref 40–53)
HGB BLD-MCNC: 11.8 G/DL (ref 13.3–17.7)
MAGNESIUM SERPL-MCNC: 2.4 MG/DL (ref 1.7–2.3)
MCH RBC QN AUTO: 28.4 PG (ref 26.5–33)
MCHC RBC AUTO-ENTMCNC: 30.6 G/DL (ref 31.5–36.5)
MCV RBC AUTO: 93 FL (ref 78–100)
PHOSPHATE SERPL-MCNC: 3.4 MG/DL (ref 2.5–4.5)
PLATELET # BLD AUTO: 160 10E3/UL (ref 150–450)
POTASSIUM SERPL-SCNC: 3.7 MMOL/L (ref 3.4–5.3)
POTASSIUM SERPL-SCNC: 4.4 MMOL/L (ref 3.4–5.3)
PROT SERPL-MCNC: 6.9 G/DL (ref 6.4–8.3)
RBC # BLD AUTO: 4.15 10E6/UL (ref 4.4–5.9)
SODIUM SERPL-SCNC: 154 MMOL/L (ref 135–145)
SODIUM SERPL-SCNC: 156 MMOL/L (ref 135–145)
WBC # BLD AUTO: 9.2 10E3/UL (ref 4–11)

## 2024-12-10 PROCEDURE — 83735 ASSAY OF MAGNESIUM: CPT | Performed by: INTERNAL MEDICINE

## 2024-12-10 PROCEDURE — 80069 RENAL FUNCTION PANEL: CPT | Performed by: INTERNAL MEDICINE

## 2024-12-10 PROCEDURE — 250N000009 HC RX 250: Performed by: INTERNAL MEDICINE

## 2024-12-10 PROCEDURE — 999N000157 HC STATISTIC RCP TIME EA 10 MIN

## 2024-12-10 PROCEDURE — 36415 COLL VENOUS BLD VENIPUNCTURE: CPT | Performed by: INTERNAL MEDICINE

## 2024-12-10 PROCEDURE — 84100 ASSAY OF PHOSPHORUS: CPT | Performed by: INTERNAL MEDICINE

## 2024-12-10 PROCEDURE — 84132 ASSAY OF SERUM POTASSIUM: CPT | Performed by: INTERNAL MEDICINE

## 2024-12-10 PROCEDURE — 250N000013 HC RX MED GY IP 250 OP 250 PS 637: Performed by: HOSPITALIST

## 2024-12-10 PROCEDURE — 94640 AIRWAY INHALATION TREATMENT: CPT | Mod: 76

## 2024-12-10 PROCEDURE — 84520 ASSAY OF UREA NITROGEN: CPT | Performed by: INTERNAL MEDICINE

## 2024-12-10 PROCEDURE — 84295 ASSAY OF SERUM SODIUM: CPT | Performed by: INTERNAL MEDICINE

## 2024-12-10 PROCEDURE — 250N000013 HC RX MED GY IP 250 OP 250 PS 637: Performed by: INTERNAL MEDICINE

## 2024-12-10 PROCEDURE — 250N000011 HC RX IP 250 OP 636: Performed by: INTERNAL MEDICINE

## 2024-12-10 PROCEDURE — 99233 SBSQ HOSP IP/OBS HIGH 50: CPT | Performed by: INTERNAL MEDICINE

## 2024-12-10 PROCEDURE — 82330 ASSAY OF CALCIUM: CPT | Performed by: INTERNAL MEDICINE

## 2024-12-10 PROCEDURE — 94640 AIRWAY INHALATION TREATMENT: CPT

## 2024-12-10 PROCEDURE — 258N000003 HC RX IP 258 OP 636: Performed by: INTERNAL MEDICINE

## 2024-12-10 PROCEDURE — 85027 COMPLETE CBC AUTOMATED: CPT | Performed by: INTERNAL MEDICINE

## 2024-12-10 PROCEDURE — 120N000001 HC R&B MED SURG/OB

## 2024-12-10 RX ORDER — HYDROCORTISONE SODIUM SUCCINATE 100 MG/2ML
50 INJECTION INTRAMUSCULAR; INTRAVENOUS EVERY 12 HOURS
Status: COMPLETED | OUTPATIENT
Start: 2024-12-10 | End: 2024-12-10

## 2024-12-10 RX ORDER — HYDROCORTISONE SODIUM SUCCINATE 100 MG/2ML
25 INJECTION INTRAMUSCULAR; INTRAVENOUS 2 TIMES DAILY
Status: COMPLETED | OUTPATIENT
Start: 2024-12-11 | End: 2024-12-11

## 2024-12-10 RX ORDER — TESTOSTERONE CYPIONATE 200 MG/ML
0.25 INJECTION, SOLUTION INTRAMUSCULAR
Status: DISCONTINUED | OUTPATIENT
Start: 2024-12-13 | End: 2024-12-13 | Stop reason: HOSPADM

## 2024-12-10 RX ADMIN — ACETYLCYSTEINE 2 ML: 200 SOLUTION ORAL; RESPIRATORY (INHALATION) at 14:30

## 2024-12-10 RX ADMIN — VANCOMYCIN HYDROCHLORIDE 750 MG: 500 INJECTION, POWDER, LYOPHILIZED, FOR SOLUTION INTRAVENOUS at 10:28

## 2024-12-10 RX ADMIN — ACETYLCYSTEINE 2 ML: 200 SOLUTION ORAL; RESPIRATORY (INHALATION) at 11:21

## 2024-12-10 RX ADMIN — ENOXAPARIN SODIUM 40 MG: 40 INJECTION SUBCUTANEOUS at 09:55

## 2024-12-10 RX ADMIN — CARBAMAZEPINE 150 MG: 100 SUSPENSION ORAL at 00:41

## 2024-12-10 RX ADMIN — PIPERACILLIN AND TAZOBACTAM 4.5 G: 4; .5 INJECTION, POWDER, FOR SOLUTION INTRAVENOUS at 09:52

## 2024-12-10 RX ADMIN — LEVOTHYROXINE SODIUM 88 MCG: 88 TABLET ORAL at 05:31

## 2024-12-10 RX ADMIN — BRIVARACETAM 100 MG: 10 SOLUTION ORAL at 22:22

## 2024-12-10 RX ADMIN — CARBAMAZEPINE 150 MG: 100 SUSPENSION ORAL at 12:43

## 2024-12-10 RX ADMIN — Medication 40 MG: at 21:08

## 2024-12-10 RX ADMIN — INSULIN ASPART 2 UNITS: 100 INJECTION, SOLUTION INTRAVENOUS; SUBCUTANEOUS at 21:19

## 2024-12-10 RX ADMIN — HYDROCORTISONE: 1 CREAM TOPICAL at 21:08

## 2024-12-10 RX ADMIN — CARBAMAZEPINE 150 MG: 100 SUSPENSION ORAL at 05:56

## 2024-12-10 RX ADMIN — METOCLOPRAMIDE HYDROCHLORIDE 10 MG: 5 SOLUTION ORAL at 21:08

## 2024-12-10 RX ADMIN — Medication 15 ML: at 10:06

## 2024-12-10 RX ADMIN — METOCLOPRAMIDE HYDROCHLORIDE 10 MG: 5 SOLUTION ORAL at 12:43

## 2024-12-10 RX ADMIN — CARBAMAZEPINE 150 MG: 100 SUSPENSION ORAL at 23:25

## 2024-12-10 RX ADMIN — Medication 1 PACKET: at 03:08

## 2024-12-10 RX ADMIN — ACETYLCYSTEINE 2 ML: 200 SOLUTION ORAL; RESPIRATORY (INHALATION) at 19:09

## 2024-12-10 RX ADMIN — ALBUTEROL SULFATE 2.5 MG: 2.5 SOLUTION RESPIRATORY (INHALATION) at 07:00

## 2024-12-10 RX ADMIN — AZITHROMYCIN MONOHYDRATE 250 MG: 500 INJECTION, POWDER, LYOPHILIZED, FOR SOLUTION INTRAVENOUS at 05:22

## 2024-12-10 RX ADMIN — VANCOMYCIN HYDROCHLORIDE 750 MG: 500 INJECTION, POWDER, LYOPHILIZED, FOR SOLUTION INTRAVENOUS at 21:51

## 2024-12-10 RX ADMIN — Medication 1 PACKET: at 05:56

## 2024-12-10 RX ADMIN — HYDROCORTISONE: 1 CREAM TOPICAL at 10:23

## 2024-12-10 RX ADMIN — ALBUTEROL SULFATE 2.5 MG: 2.5 SOLUTION RESPIRATORY (INHALATION) at 19:09

## 2024-12-10 RX ADMIN — PIPERACILLIN AND TAZOBACTAM 4.5 G: 4; .5 INJECTION, POWDER, FOR SOLUTION INTRAVENOUS at 21:07

## 2024-12-10 RX ADMIN — PIPERACILLIN AND TAZOBACTAM 4.5 G: 4; .5 INJECTION, POWDER, FOR SOLUTION INTRAVENOUS at 15:14

## 2024-12-10 RX ADMIN — HYDROCORTISONE SODIUM SUCCINATE 50 MG: 100 INJECTION, POWDER, FOR SOLUTION INTRAMUSCULAR; INTRAVENOUS at 17:02

## 2024-12-10 RX ADMIN — ACETYLCYSTEINE 2 ML: 200 SOLUTION ORAL; RESPIRATORY (INHALATION) at 07:00

## 2024-12-10 RX ADMIN — ALBUTEROL SULFATE 2.5 MG: 2.5 SOLUTION RESPIRATORY (INHALATION) at 14:30

## 2024-12-10 RX ADMIN — PIPERACILLIN AND TAZOBACTAM 4.5 G: 4; .5 INJECTION, POWDER, FOR SOLUTION INTRAVENOUS at 04:38

## 2024-12-10 RX ADMIN — INSULIN ASPART 1 UNITS: 100 INJECTION, SOLUTION INTRAVENOUS; SUBCUTANEOUS at 03:07

## 2024-12-10 RX ADMIN — CARBAMAZEPINE 100 MG: 100 SUSPENSION ORAL at 21:08

## 2024-12-10 RX ADMIN — BACITRACIN: 500 OINTMENT TOPICAL at 21:08

## 2024-12-10 RX ADMIN — PANTOPRAZOLE SODIUM 40 MG: 40 INJECTION, POWDER, FOR SOLUTION INTRAVENOUS at 09:52

## 2024-12-10 RX ADMIN — BRIVARACETAM 100 MG: 10 SOLUTION ORAL at 09:56

## 2024-12-10 RX ADMIN — ALBUTEROL SULFATE 2.5 MG: 2.5 SOLUTION RESPIRATORY (INHALATION) at 11:21

## 2024-12-10 RX ADMIN — HYDROCORTISONE SODIUM SUCCINATE 50 MG: 100 INJECTION, POWDER, FOR SOLUTION INTRAMUSCULAR; INTRAVENOUS at 05:24

## 2024-12-10 RX ADMIN — METOCLOPRAMIDE HYDROCHLORIDE 10 MG: 5 SOLUTION ORAL at 15:14

## 2024-12-10 RX ADMIN — METOCLOPRAMIDE HYDROCHLORIDE 10 MG: 5 SOLUTION ORAL at 10:06

## 2024-12-10 RX ADMIN — Medication 1 PACKET: at 09:55

## 2024-12-10 ASSESSMENT — ACTIVITIES OF DAILY LIVING (ADL)
WALKING_OR_CLIMBING_STAIRS: TRANSFERRING DIFFICULTY, DEPENDENT;AMBULATION DIFFICULTY, DEPENDENT;STAIR CLIMBING DIFFICULTY, DEPENDENT
ADLS_ACUITY_SCORE: 91
DIFFICULTY_EATING/SWALLOWING: YES
FALL_HISTORY_WITHIN_LAST_SIX_MONTHS: NO
ADLS_ACUITY_SCORE: 95
ADLS_ACUITY_SCORE: 95
ADLS_ACUITY_SCORE: 97
ADLS_ACUITY_SCORE: 91
ADLS_ACUITY_SCORE: 93
CHANGE_IN_FUNCTIONAL_STATUS_SINCE_ONSET_OF_CURRENT_ILLNESS/INJURY: NO
ADLS_ACUITY_SCORE: 91
ADLS_ACUITY_SCORE: 91
ADLS_ACUITY_SCORE: 97
WALKING_OR_CLIMBING_STAIRS_DIFFICULTY: YES
CONCENTRATING,_REMEMBERING_OR_MAKING_DECISIONS_DIFFICULTY: YES
ADLS_ACUITY_SCORE: 91
ADLS_ACUITY_SCORE: 91
ADLS_ACUITY_SCORE: 93
HEARING_DIFFICULTY_OR_DEAF: NO
ADLS_ACUITY_SCORE: 93
TOILETING: 2-->COMPLETELY DEPENDENT
ADLS_ACUITY_SCORE: 91
EATING/SWALLOWING: SWALLOWING SOLID FOOD;SWALLOWING LIQUIDS;EATING
ADLS_ACUITY_SCORE: 91
ADLS_ACUITY_SCORE: 97
ADLS_ACUITY_SCORE: 91
TOILETING: 2-->COMPLETELY DEPENDENT (NOT DEVELOPMENTALLY APPROPRIATE)
ADLS_ACUITY_SCORE: 95
ADLS_ACUITY_SCORE: 97
WEAR_GLASSES_OR_BLIND: NO
TOILETING_ISSUES: YES
DRESSING/BATHING_DIFFICULTY: YES
DIFFICULTY_COMMUNICATING: YES
DEPENDENT_IADLS:: CLEANING;COOKING;LAUNDRY;SHOPPING;MEAL PREPARATION;MEDICATION MANAGEMENT;MONEY MANAGEMENT;TRANSPORTATION
ADLS_ACUITY_SCORE: 93
DOING_ERRANDS_INDEPENDENTLY_DIFFICULTY: YES
ADLS_ACUITY_SCORE: 95
ADLS_ACUITY_SCORE: 91
ADLS_ACUITY_SCORE: 95
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
## 2024-12-10 NOTE — PLAN OF CARE
Goal Outcome Evaluation:      Plan of Care Reviewed With: patient    Overall Patient Progress: improvingOverall Patient Progress: improving     SUMMARY: Hx TBI, frequent admissions with aspiration pneumonia    DATE & TIME: 2024 1325-7205     Cognitive Concerns/ Orientation: Alert, HECTOR orientation nonverbal, calm and cooperative. Smiling a lot and interactive this shift   BEHAVIOR & AGGRESSION TOOL COLOR: Green  ABNL VS/O2: VSS on RA  MOBILITY: Total care, turn and repo q2, HOB at least 30 degrees for tube feeding. Right sided hemiparesis baseline  PAIN MANAGMENT: No signs of pain this shift  DIET: Strict NPO, tube feeding running through J tube at 70mL/hr.(Goal rate). FWF programmed at 200mL q4h  BOWEL/BLADDER: Incontinent of bowel/bladder; 1 BM this shift   ABNL LAB/BG: B; K 3.5, Mg 2.2- rechecks  placed for AM draw, Ph 2.3 - replaced, recheck placed for 0600  DRAIN/DEVICES: GJ tube running TF; L PIV SL   TELEMETRY RHYTHM: N/A  SKIN: Scattered bruises, PI on sacrum scabbed/red (clean with sarah/apply barrier cream and leave ANANYA per WOC); scattered scabs including R 2nd toe. +1 bilateral foot and BUE edema.   TESTS/PROCEDURES: None this shift   D/C DATE: possibly tomorrow pending labs back to home  OTHER IMPORTANT INFO: WOC, social work, nutrition consult

## 2024-12-10 NOTE — PLAN OF CARE
Goal Outcome Evaluation:                 Outcome Evaluation: Patient will discharge home with home care

## 2024-12-10 NOTE — CONSULTS
Care Management Initial Consult    General Information  Assessment completed with: VM-chart review, Parents, Call placed to Mother/Guardian Savannah, conversed with patient's Sister Betsy  Type of CM/SW Visit: Offer D/C Planning    Primary Care Provider verified and updated as needed: Yes   Readmission within the last 30 days: no previous admission in last 30 days      Reason for Consult: other (see comments), discharge planning (elevated risk)  Advance Care Planning: Advance Care Planning Reviewed: present on chart     General Information Comments: High readmission risk    Communication Assessment  Patient's communication style: spoken language (English or Bilingual)        Cognitive  Cognitive/Neuro/Behavioral: .WDL except  Level of Consciousness: alert  Arousal Level: opens eyes spontaneously  Orientation: other (see comments) (HECTOR-nonverbal)  Mood/Behavior: calm, cooperative  Best Language: 2 - Severe aphasia  Speech: garbled    Living Environment:   People in home: parent(s)     Current living Arrangements: house      Able to return to prior arrangements: yes       Family/Social Support:  Care provided by: parent(s), homecare agency  Provides care for: no one, unable/limited ability to care for self  Marital Status: Single  Support system: Parent(s)          Description of Support System: Involved    Support Assessment: Lacks necessary supervision and assistance, Caregiver experiencing high stress, Limited social contact and support    Current Resources:   Patient receiving home care services: Yes        Community Resources: [facility]  Equipment currently used at home: hospital bed, lift device, wheelchair, manual  Supplies currently used at home: Incontinence Supplies, Enteral Nutrition & Supplies, Nebulizer tubing    Employment/Financial:  Employment Status: disabled        Financial Concerns: none           Does the patient's insurance plan have a 3 day qualifying hospital stay waiver?  Yes      Which insurance plan 3 day waiver is available? [employer]    Will the waiver be used for post-acute placement? Undetermined at this time    Lifestyle & Psychosocial Needs:  Social Drivers of Health     Food Insecurity: Unknown (11/2/2024)    Food Insecurity     Within the past 12 months, did you worry that your food would run out before you got money to buy more?: Patient unable to answer     Within the past 12 months, did the food you bought just not last and you didn t have money to get more?: Patient unable to answer   Depression: Not at risk (8/5/2024)    PHQ-2     PHQ-2 Score: 1   Housing Stability: Unknown (11/2/2024)    Housing Stability     Do you have housing? : Patient unable to answer     Are you worried about losing your housing?: Patient unable to answer   Tobacco Use: Medium Risk (12/1/2024)    Patient History     Smoking Tobacco Use: Former     Smokeless Tobacco Use: Never     Passive Exposure: Not on file   Financial Resource Strain: Unknown (11/2/2024)    Financial Resource Strain     Within the past 12 months, have you or your family members you live with been unable to get utilities (heat, electricity) when it was really needed?: Patient unable to answer   Alcohol Use: Not on file   Transportation Needs: Unknown (11/2/2024)    Transportation Needs     Within the past 12 months, has lack of transportation kept you from medical appointments, getting your medicines, non-medical meetings or appointments, work, or from getting things that you need?: Patient unable to answer   Physical Activity: Not on file   Interpersonal Safety: Unknown (11/2/2024)    Interpersonal Safety     Do you feel physically and emotionally safe where you currently live?: Patient unable to answer     Within the past 12 months, have you been hit, slapped, kicked or otherwise physically hurt by someone?: Patient unable to answer     Within the past 12 months, have you been humiliated or emotionally abused in other ways by  "your partner or ex-partner?: Patient unable to answer   Stress: Not on file   Social Connections: Not on file   Health Literacy: Not on file       Functional Status:  Prior to admission patient needed assistance:   Dependent ADLs:: Bathing, Dressing, Eating, Grooming, Incontinence, Positioning, Transfers, Wheelchair-with assist, Toileting  Dependent IADLs:: Cleaning, Cooking, Laundry, Shopping, Meal Preparation, Medication Management, Money Management, Transportation       Mental Health Status:  No current concerns          Chemical Dependency Status:  No current concerns                Values/Beliefs:  Spiritual, Cultural Beliefs, Caodaism Practices, Values that affect care: no               Discussed  Partnership in Safe Discharge Planning  document with patient/family: No    Additional Information:  Care Coordinator is very familiar with patient.  Have placed a call to his Mother, Savannah, that is also patient's Legal Guardian.  Patient has a history of TBI with aphasia, R sided spastic hemiplegia and dysphagia with GJ-tube for TFs/meds, seizure disorder, panhypopituitarism.   Patient continues to be a total care for all ADL/IADLs and is nonverbal.  He communicates by \"thumbs up\" and uses his eyebrows.    Patient's tube feedings are thru [facility].      This is patient's 12 admission this year with many ED visits in addition.  Patient is care for by his mother.  Three to four years ago, patient was approved for 24 hour care thru [facility] and he had a 12 hr PCA evening and night and then a 12 hr nurse during the day.  Little by little, the ability to find staff for help at home has really decreased to the point that Savannah is doing most of the care giving.    Will need further discussion with Savannah concerning if the night time PCA is still living with them and providing care.      Have had discussions with Savannah concerning transition to a facility that is able to provide round the clock care.  " Savannah will not consider this due to poor care and she has felt she is able to provide the care for him.    Patient was open to [facility]. ([phone]) for SN/PT/OT/SLP and HHA.  Conversed with this agency's Intake this AM.  Patient's certification ended 11/26/24.  They are able to accept patient back for home care services, but patient will need all new orders.  They are aware that discharge is planned for tomorrow potentially.    Addendum 1600: Discharge planning is in 2-4 days.  CC has still not received call back from patient's mother.  Patient's Sister, Betsy, was called and noted maybe her mother accidentally put her phone on silence.  She will check on her mother as she lives three homes from her mother.  Next Steps:   Care Management is following    Karen Collins, RN  Inpatient Care Management  [phone]
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
## 2024-12-11 ENCOUNTER — MEDICAL CORRESPONDENCE (OUTPATIENT)
Dept: HEALTH INFORMATION MANAGEMENT | Facility: CLINIC | Age: 64
End: 2024-12-11

## 2024-12-11 LAB
ALBUMIN SERPL BCG-MCNC: 3.1 G/DL (ref 3.5–5.2)
ALP SERPL-CCNC: 77 U/L (ref 40–150)
ALT SERPL W P-5'-P-CCNC: 87 U/L (ref 0–70)
ANION GAP SERPL CALCULATED.3IONS-SCNC: 8 MMOL/L (ref 7–15)
AST SERPL W P-5'-P-CCNC: 78 U/L (ref 0–45)
BASOPHILS # BLD AUTO: 0.1 10E3/UL (ref 0–0.2)
BASOPHILS NFR BLD AUTO: 1 %
BILIRUB DIRECT SERPL-MCNC: <0.2 MG/DL (ref 0–0.3)
BILIRUB SERPL-MCNC: 0.2 MG/DL
BUN SERPL-MCNC: 17.8 MG/DL (ref 8–23)
CA-I BLD-MCNC: 4.2 MG/DL (ref 4.4–5.2)
CALCIUM SERPL-MCNC: 8 MG/DL (ref 8.8–10.4)
CHLORIDE SERPL-SCNC: 111 MMOL/L (ref 98–107)
CREAT SERPL-MCNC: 0.88 MG/DL (ref 0.67–1.17)
EGFRCR SERPLBLD CKD-EPI 2021: >90 ML/MIN/1.73M2
EOSINOPHIL # BLD AUTO: 0.2 10E3/UL (ref 0–0.7)
EOSINOPHIL NFR BLD AUTO: 2 %
ERYTHROCYTE [DISTWIDTH] IN BLOOD BY AUTOMATED COUNT: 16.6 % (ref 10–15)
GLUCOSE BLDC GLUCOMTR-MCNC: 125 MG/DL (ref 70–99)
GLUCOSE BLDC GLUCOMTR-MCNC: 151 MG/DL (ref 70–99)
GLUCOSE BLDC GLUCOMTR-MCNC: 151 MG/DL (ref 70–99)
GLUCOSE BLDC GLUCOMTR-MCNC: 181 MG/DL (ref 70–99)
GLUCOSE BLDC GLUCOMTR-MCNC: 182 MG/DL (ref 70–99)
GLUCOSE BLDC GLUCOMTR-MCNC: 182 MG/DL (ref 70–99)
GLUCOSE SERPL-MCNC: 180 MG/DL (ref 70–99)
HCO3 SERPL-SCNC: 29 MMOL/L (ref 22–29)
HCT VFR BLD AUTO: 33.9 % (ref 40–53)
HGB BLD-MCNC: 10.7 G/DL (ref 13.3–17.7)
IMM GRANULOCYTES # BLD: 0.1 10E3/UL
IMM GRANULOCYTES NFR BLD: 1 %
LYMPHOCYTES # BLD AUTO: 1.6 10E3/UL (ref 0.8–5.3)
LYMPHOCYTES NFR BLD AUTO: 21 %
MAGNESIUM SERPL-MCNC: 2.3 MG/DL (ref 1.7–2.3)
MCH RBC QN AUTO: 28.9 PG (ref 26.5–33)
MCHC RBC AUTO-ENTMCNC: 31.6 G/DL (ref 31.5–36.5)
MCV RBC AUTO: 92 FL (ref 78–100)
MONOCYTES # BLD AUTO: 0.4 10E3/UL (ref 0–1.3)
MONOCYTES NFR BLD AUTO: 6 %
NEUTROPHILS # BLD AUTO: 5.1 10E3/UL (ref 1.6–8.3)
NEUTROPHILS NFR BLD AUTO: 69 %
NRBC # BLD AUTO: 0 10E3/UL
NRBC BLD AUTO-RTO: 0 /100
PHOSPHATE SERPL-MCNC: 3.3 MG/DL (ref 2.5–4.5)
PLATELET # BLD AUTO: 193 10E3/UL (ref 150–450)
POTASSIUM SERPL-SCNC: 3.7 MMOL/L (ref 3.4–5.3)
PROT SERPL-MCNC: 6.1 G/DL (ref 6.4–8.3)
RBC # BLD AUTO: 3.7 10E6/UL (ref 4.4–5.9)
SODIUM SERPL-SCNC: 148 MMOL/L (ref 135–145)
VANCOMYCIN SERPL-MCNC: 18.9 UG/ML
WBC # BLD AUTO: 7.4 10E3/UL (ref 4–11)

## 2024-12-11 PROCEDURE — 250N000013 HC RX MED GY IP 250 OP 250 PS 637: Performed by: INTERNAL MEDICINE

## 2024-12-11 PROCEDURE — 80053 COMPREHEN METABOLIC PANEL: CPT | Performed by: INTERNAL MEDICINE

## 2024-12-11 PROCEDURE — 250N000011 HC RX IP 250 OP 636: Performed by: INTERNAL MEDICINE

## 2024-12-11 PROCEDURE — 99233 SBSQ HOSP IP/OBS HIGH 50: CPT | Performed by: INTERNAL MEDICINE

## 2024-12-11 PROCEDURE — 80202 ASSAY OF VANCOMYCIN: CPT | Performed by: HOSPITALIST

## 2024-12-11 PROCEDURE — 258N000003 HC RX IP 258 OP 636: Performed by: INTERNAL MEDICINE

## 2024-12-11 PROCEDURE — 120N000001 HC R&B MED SURG/OB

## 2024-12-11 PROCEDURE — 82248 BILIRUBIN DIRECT: CPT | Performed by: INTERNAL MEDICINE

## 2024-12-11 PROCEDURE — 84100 ASSAY OF PHOSPHORUS: CPT | Performed by: INTERNAL MEDICINE

## 2024-12-11 PROCEDURE — 36415 COLL VENOUS BLD VENIPUNCTURE: CPT | Performed by: INTERNAL MEDICINE

## 2024-12-11 PROCEDURE — 94640 AIRWAY INHALATION TREATMENT: CPT

## 2024-12-11 PROCEDURE — 82040 ASSAY OF SERUM ALBUMIN: CPT | Performed by: INTERNAL MEDICINE

## 2024-12-11 PROCEDURE — 82330 ASSAY OF CALCIUM: CPT | Performed by: INTERNAL MEDICINE

## 2024-12-11 PROCEDURE — 85025 COMPLETE CBC W/AUTO DIFF WBC: CPT | Performed by: INTERNAL MEDICINE

## 2024-12-11 PROCEDURE — 999N000157 HC STATISTIC RCP TIME EA 10 MIN

## 2024-12-11 PROCEDURE — 250N000009 HC RX 250: Performed by: INTERNAL MEDICINE

## 2024-12-11 PROCEDURE — 85004 AUTOMATED DIFF WBC COUNT: CPT | Performed by: INTERNAL MEDICINE

## 2024-12-11 PROCEDURE — 83735 ASSAY OF MAGNESIUM: CPT | Performed by: INTERNAL MEDICINE

## 2024-12-11 PROCEDURE — 94640 AIRWAY INHALATION TREATMENT: CPT | Mod: 76

## 2024-12-11 RX ORDER — CEFAZOLIN SODIUM 1 G/50ML
750 SOLUTION INTRAVENOUS EVERY 12 HOURS
Status: COMPLETED | OUTPATIENT
Start: 2024-12-11 | End: 2024-12-11

## 2024-12-11 RX ADMIN — PIPERACILLIN AND TAZOBACTAM 4.5 G: 4; .5 INJECTION, POWDER, FOR SOLUTION INTRAVENOUS at 09:23

## 2024-12-11 RX ADMIN — METOCLOPRAMIDE HYDROCHLORIDE 10 MG: 5 SOLUTION ORAL at 15:32

## 2024-12-11 RX ADMIN — AZITHROMYCIN MONOHYDRATE 250 MG: 500 INJECTION, POWDER, LYOPHILIZED, FOR SOLUTION INTRAVENOUS at 05:41

## 2024-12-11 RX ADMIN — ALBUTEROL SULFATE 2.5 MG: 2.5 SOLUTION RESPIRATORY (INHALATION) at 15:21

## 2024-12-11 RX ADMIN — CARBAMAZEPINE 100 MG: 100 SUSPENSION ORAL at 20:50

## 2024-12-11 RX ADMIN — Medication 1 PACKET: at 09:42

## 2024-12-11 RX ADMIN — HYDROCORTISONE: 1 CREAM TOPICAL at 09:50

## 2024-12-11 RX ADMIN — ALBUTEROL SULFATE 2.5 MG: 2.5 SOLUTION RESPIRATORY (INHALATION) at 11:23

## 2024-12-11 RX ADMIN — INSULIN ASPART 1 UNITS: 100 INJECTION, SOLUTION INTRAVENOUS; SUBCUTANEOUS at 14:53

## 2024-12-11 RX ADMIN — Medication 40 MG: at 20:50

## 2024-12-11 RX ADMIN — PIPERACILLIN AND TAZOBACTAM 4.5 G: 4; .5 INJECTION, POWDER, FOR SOLUTION INTRAVENOUS at 05:16

## 2024-12-11 RX ADMIN — CARBAMAZEPINE 150 MG: 100 SUSPENSION ORAL at 12:09

## 2024-12-11 RX ADMIN — BACITRACIN: 500 OINTMENT TOPICAL at 20:50

## 2024-12-11 RX ADMIN — METOCLOPRAMIDE HYDROCHLORIDE 10 MG: 5 SOLUTION ORAL at 12:09

## 2024-12-11 RX ADMIN — LEVOTHYROXINE SODIUM 88 MCG: 88 TABLET ORAL at 05:41

## 2024-12-11 RX ADMIN — ACETYLCYSTEINE 2 ML: 200 SOLUTION ORAL; RESPIRATORY (INHALATION) at 07:31

## 2024-12-11 RX ADMIN — METOCLOPRAMIDE HYDROCHLORIDE 10 MG: 5 SOLUTION ORAL at 20:50

## 2024-12-11 RX ADMIN — ACETYLCYSTEINE 2 ML: 200 SOLUTION ORAL; RESPIRATORY (INHALATION) at 15:21

## 2024-12-11 RX ADMIN — INSULIN ASPART 1 UNITS: 100 INJECTION, SOLUTION INTRAVENOUS; SUBCUTANEOUS at 21:30

## 2024-12-11 RX ADMIN — VANCOMYCIN HYDROCHLORIDE 750 MG: 500 INJECTION, POWDER, LYOPHILIZED, FOR SOLUTION INTRAVENOUS at 09:55

## 2024-12-11 RX ADMIN — ALBUTEROL SULFATE 2.5 MG: 2.5 SOLUTION RESPIRATORY (INHALATION) at 07:31

## 2024-12-11 RX ADMIN — HYDROCORTISONE: 1 CREAM TOPICAL at 20:50

## 2024-12-11 RX ADMIN — ACETYLCYSTEINE 2 ML: 200 SOLUTION ORAL; RESPIRATORY (INHALATION) at 11:23

## 2024-12-11 RX ADMIN — INSULIN ASPART 1 UNITS: 100 INJECTION, SOLUTION INTRAVENOUS; SUBCUTANEOUS at 18:18

## 2024-12-11 RX ADMIN — HYDROCORTISONE SODIUM SUCCINATE 25 MG: 100 INJECTION, POWDER, FOR SOLUTION INTRAMUSCULAR; INTRAVENOUS at 09:27

## 2024-12-11 RX ADMIN — ACETYLCYSTEINE 2 ML: 200 SOLUTION ORAL; RESPIRATORY (INHALATION) at 19:51

## 2024-12-11 RX ADMIN — BRIVARACETAM 100 MG: 10 SOLUTION ORAL at 21:27

## 2024-12-11 RX ADMIN — CARBAMAZEPINE 150 MG: 100 SUSPENSION ORAL at 05:16

## 2024-12-11 RX ADMIN — INSULIN ASPART 1 UNITS: 100 INJECTION, SOLUTION INTRAVENOUS; SUBCUTANEOUS at 05:45

## 2024-12-11 RX ADMIN — Medication 15 ML: at 09:40

## 2024-12-11 RX ADMIN — HYDROCORTISONE SODIUM SUCCINATE 25 MG: 100 INJECTION, POWDER, FOR SOLUTION INTRAMUSCULAR; INTRAVENOUS at 16:52

## 2024-12-11 RX ADMIN — INSULIN ASPART 1 UNITS: 100 INJECTION, SOLUTION INTRAVENOUS; SUBCUTANEOUS at 01:52

## 2024-12-11 RX ADMIN — ALBUTEROL SULFATE 2.5 MG: 2.5 SOLUTION RESPIRATORY (INHALATION) at 19:51

## 2024-12-11 RX ADMIN — VANCOMYCIN HYDROCHLORIDE 750 MG: 1 INJECTION, POWDER, LYOPHILIZED, FOR SOLUTION INTRAVENOUS at 21:30

## 2024-12-11 RX ADMIN — ENOXAPARIN SODIUM 40 MG: 40 INJECTION SUBCUTANEOUS at 09:29

## 2024-12-11 RX ADMIN — BRIVARACETAM 100 MG: 10 SOLUTION ORAL at 12:03

## 2024-12-11 RX ADMIN — METOCLOPRAMIDE HYDROCHLORIDE 10 MG: 5 SOLUTION ORAL at 09:41

## 2024-12-11 RX ADMIN — Medication 40 MG: at 09:40

## 2024-12-11 RX ADMIN — CARBAMAZEPINE 150 MG: 100 SUSPENSION ORAL at 23:22

## 2024-12-11 ASSESSMENT — ACTIVITIES OF DAILY LIVING (ADL)
ADLS_ACUITY_SCORE: 93
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
## 2024-12-11 NOTE — PROGRESS NOTES
[facility] Medicare ACO Reach Population - Proactive Outreach  Unable to Reach    Background: Patient outreach conducted proactively to support health maintenance initiatives within [facility]'s Medicare ACO Reach Population.     No second attempt made as patient has hospital follow-up scheduled for 12/17/24.    Plan:  Care Coordinator will do no further outreaches at this time.    Gracie Srinivasan RN  [facility]
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
## 2024-12-11 NOTE — PROGRESS NOTES
Clinic Care Coordination Contact  [facility]: Post-Discharge Note  SITUATION                                                      Admission:    Admission Date: 12/27/21   Reason for Admission: Fever and tachycardia  Discharge:   Discharge Date: 12/28/21  Discharge Diagnosis: Fever and tachycardia    BACKGROUND                                                       Mr. 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  History is obtained through discussion with the ED physician and chart review, as the patient is unable to provide any history to me.  I do note that in chart review the patient was admitted to [facility] from 12/05/2021-12/13/2021 for aspiration pneumonia and respiratory failure, where he initially was on vancomycin and Zosyn and then transitioned to Augmentin upon discharge.  The mother today noted the patient had low oxygen saturations with a fever to 101 degrees Fahrenheit and was tachycardic into the 120s.  He was also less interactive than is typical for his baseline.  He came to the Emergency Department, where he was febrile and tachycardic, but this improved with IV fluids.  He is now resting comfortably and does not tell me that he has any concerns with yes-and-no questioning.  A chest x-ray did show some bibasilar opacities, but it seems fairly chronic and no definitive new pneumonia.  His vitals are now stable, and his lab work is unremarkable.  He was given a dose of Unasyn for probable aspiration pneumonia, for which this has become somewhat recurrent issue.  Initial plan was for potentially to discharge to home, but mother declined to take him back, thinking he needed to spend at least one night in the hospital.  He  Patient is a 65-year-old female with PMH of COPD, bipolar disorder, HLD and recent treatment for UTIs who has had multiple ER visits for falls, weakness, confusion and UTI over the course of the past 2 months.  Patient reported back to the ER on 12/7/2024 after sustaining multiple falls in the home.  She presented with behaviors, waving her hands and yelling.  She had reported difficulties with bilateral hand pain related to dry chapped hands.  Indicated she was unable to provide herself lotion to her hands.  Over the course of time patient's behaviors appear to becoming more erratic, question underlying metabolic etiology for toxic metabolic encephalopathy versus psychiatric etiology.  Patient was admitted, psychiatry evaluated patient and feels patient does need inpatient psych care however may have underlying neurological or metabolic causes to her behaviors requesting consultation.  Patient has positive UTI for which medical team has had discussion with infectious disease.  Patient is alert and oriented x 3 however has speech that is tangential.  She gets confused and needs redirecting for simple commands.  She has slightly ataxic gait with freezing type gait pattern to the left lower extremity.  Will get an MRI of the brain for further evaluation of centralized cause for her changes.  Patient is also sexually active without use of protection.  Will assess for RPR and HIV.  Patient also may have some vitamin deficiencies leading to ataxia motor changes will get vitamin D, B12 and  E.    -Fall risk  -MRI of the brain with without contrast- attempted today,  -Labs with RPR, HIV, vitamin D, vitamin B12, vitamin B1   Vitamin D on low normal, would benefit from starting daily replacement.     RPR, B12 are in Normal ranges   HIV is pending consent   - continue with PT/OT  -Avoid medications that can alter patient mentation including benzodiazepines, sedatives and certain antibiotics such as cefepime  - IV fluids per  is thus being admitted for observation.  The patient denies any chest pain or any other discomfort at this point.    ASSESSMENT      Enrollment  Primary Care Care Coordination Status: Not a Candidate    Discharge Assessment  How are you doing now that you are home?: He is doing okay  How are your symptoms? (Red Flag symptoms escalate to triage hotline per guidelines): Unchanged  Do you feel your condition is stable enough to be safe at home until your provider visit?: Yes  Does the patient have their discharge instructions? : Yes  Does the patient have questions regarding their discharge instructions? : No  Were you started on any new medications or were there changes to any of your previous medications? : Yes  Does the patient have all of their medications?: Yes  Do you have questions regarding any of your medications? : No  Do you have all of your needed medical supplies or equipment (DME)?  (i.e. oxygen tank, CPAP, cane, etc.): Yes  Discharge follow-up appointment scheduled within 14 calendar days? : No  Is patient agreeable to assistance with scheduling? : No                  PLAN                                                      Outpatient Plan:Follow up with primary care provider, Carlos Gomez, within 7 days for  hospital follow- up. No follow up labs or test are needed.     Future Appointments   Date Time Provider Department Center   1/5/2022  2:00 PM Faizan Mclean MD [facility]         For any urgent concerns, please contact our 24 hour nurse triage line: [phone])         WALLY Mina  [phone]  [facility]                 medical team  - Antibiotics per medical team

## 2024-12-12 VITALS
RESPIRATION RATE: 16 BRPM | TEMPERATURE: 97 F | SYSTOLIC BLOOD PRESSURE: 131 MMHG | OXYGEN SATURATION: 96 % | BODY MASS INDEX: 25.91 KG/M2 | HEART RATE: 62 BPM | WEIGHT: 180.56 LBS | DIASTOLIC BLOOD PRESSURE: 63 MMHG

## 2024-12-12 LAB
ALBUMIN SERPL BCG-MCNC: 3.3 G/DL (ref 3.5–5.2)
ALP SERPL-CCNC: 84 U/L (ref 40–150)
ALT SERPL W P-5'-P-CCNC: 79 U/L (ref 0–70)
ANION GAP SERPL CALCULATED.3IONS-SCNC: 7 MMOL/L (ref 7–15)
AST SERPL W P-5'-P-CCNC: 53 U/L (ref 0–45)
BACTERIA BLD CULT: NO GROWTH
BACTERIA SPT CULT: ABNORMAL
BILIRUB SERPL-MCNC: 0.2 MG/DL
BUN SERPL-MCNC: 17.9 MG/DL (ref 8–23)
CA-I BLD-MCNC: 4.5 MG/DL (ref 4.4–5.2)
CALCIUM SERPL-MCNC: 8.5 MG/DL (ref 8.8–10.4)
CHLORIDE SERPL-SCNC: 111 MMOL/L (ref 98–107)
CREAT SERPL-MCNC: 0.78 MG/DL (ref 0.67–1.17)
EGFRCR SERPLBLD CKD-EPI 2021: >90 ML/MIN/1.73M2
GLUCOSE BLDC GLUCOMTR-MCNC: 111 MG/DL (ref 70–99)
GLUCOSE BLDC GLUCOMTR-MCNC: 117 MG/DL (ref 70–99)
GLUCOSE BLDC GLUCOMTR-MCNC: 135 MG/DL (ref 70–99)
GLUCOSE BLDC GLUCOMTR-MCNC: 146 MG/DL (ref 70–99)
GLUCOSE BLDC GLUCOMTR-MCNC: 159 MG/DL (ref 70–99)
GLUCOSE BLDC GLUCOMTR-MCNC: 94 MG/DL (ref 70–99)
GLUCOSE SERPL-MCNC: 137 MG/DL (ref 70–99)
GRAM STAIN RESULT: ABNORMAL
GRAM STAIN RESULT: ABNORMAL
HCO3 SERPL-SCNC: 30 MMOL/L (ref 22–29)
MAGNESIUM SERPL-MCNC: 2.4 MG/DL (ref 1.7–2.3)
PHOSPHATE SERPL-MCNC: 3 MG/DL (ref 2.5–4.5)
POTASSIUM SERPL-SCNC: 3.7 MMOL/L (ref 3.4–5.3)
PROT SERPL-MCNC: 6.5 G/DL (ref 6.4–8.3)
SODIUM SERPL-SCNC: 148 MMOL/L (ref 135–145)

## 2024-12-12 PROCEDURE — 99232 SBSQ HOSP IP/OBS MODERATE 35: CPT | Performed by: INTERNAL MEDICINE

## 2024-12-12 PROCEDURE — 83735 ASSAY OF MAGNESIUM: CPT | Performed by: INTERNAL MEDICINE

## 2024-12-12 PROCEDURE — 250N000009 HC RX 250: Performed by: INTERNAL MEDICINE

## 2024-12-12 PROCEDURE — 250N000011 HC RX IP 250 OP 636: Performed by: INTERNAL MEDICINE

## 2024-12-12 PROCEDURE — 250N000013 HC RX MED GY IP 250 OP 250 PS 637: Performed by: INTERNAL MEDICINE

## 2024-12-12 PROCEDURE — 82330 ASSAY OF CALCIUM: CPT | Performed by: INTERNAL MEDICINE

## 2024-12-12 PROCEDURE — 120N000001 HC R&B MED SURG/OB

## 2024-12-12 PROCEDURE — 94640 AIRWAY INHALATION TREATMENT: CPT | Mod: 76

## 2024-12-12 PROCEDURE — 84155 ASSAY OF PROTEIN SERUM: CPT | Performed by: INTERNAL MEDICINE

## 2024-12-12 PROCEDURE — 36415 COLL VENOUS BLD VENIPUNCTURE: CPT | Performed by: INTERNAL MEDICINE

## 2024-12-12 PROCEDURE — 84132 ASSAY OF SERUM POTASSIUM: CPT | Performed by: INTERNAL MEDICINE

## 2024-12-12 PROCEDURE — 94640 AIRWAY INHALATION TREATMENT: CPT

## 2024-12-12 PROCEDURE — 250N000013 HC RX MED GY IP 250 OP 250 PS 637: Performed by: HOSPITALIST

## 2024-12-12 PROCEDURE — 999N000157 HC STATISTIC RCP TIME EA 10 MIN

## 2024-12-12 PROCEDURE — 84100 ASSAY OF PHOSPHORUS: CPT | Performed by: INTERNAL MEDICINE

## 2024-12-12 RX ADMIN — BRIVARACETAM 100 MG: 10 SOLUTION ORAL at 21:49

## 2024-12-12 RX ADMIN — HYDROCORTISONE 15 MG: 10 TABLET ORAL at 09:39

## 2024-12-12 RX ADMIN — Medication 15 ML: at 09:29

## 2024-12-12 RX ADMIN — ALBUTEROL SULFATE 2.5 MG: 2.5 SOLUTION RESPIRATORY (INHALATION) at 08:14

## 2024-12-12 RX ADMIN — CARBAMAZEPINE 150 MG: 100 SUSPENSION ORAL at 13:45

## 2024-12-12 RX ADMIN — ACETYLCYSTEINE 2 ML: 200 SOLUTION ORAL; RESPIRATORY (INHALATION) at 11:01

## 2024-12-12 RX ADMIN — HYDROCORTISONE: 1 CREAM TOPICAL at 09:33

## 2024-12-12 RX ADMIN — HYDROCORTISONE 7.5 MG: 5 TABLET ORAL at 13:45

## 2024-12-12 RX ADMIN — ALBUTEROL SULFATE 2.5 MG: 2.5 SOLUTION RESPIRATORY (INHALATION) at 20:31

## 2024-12-12 RX ADMIN — CARBAMAZEPINE 100 MG: 100 SUSPENSION ORAL at 21:49

## 2024-12-12 RX ADMIN — Medication 40 MG: at 21:49

## 2024-12-12 RX ADMIN — INSULIN ASPART 1 UNITS: 100 INJECTION, SOLUTION INTRAVENOUS; SUBCUTANEOUS at 02:35

## 2024-12-12 RX ADMIN — ACETYLCYSTEINE 2 ML: 200 SOLUTION ORAL; RESPIRATORY (INHALATION) at 20:31

## 2024-12-12 RX ADMIN — ALBUTEROL SULFATE 2.5 MG: 2.5 SOLUTION RESPIRATORY (INHALATION) at 11:01

## 2024-12-12 RX ADMIN — INSULIN ASPART 1 UNITS: 100 INJECTION, SOLUTION INTRAVENOUS; SUBCUTANEOUS at 13:45

## 2024-12-12 RX ADMIN — ACETYLCYSTEINE 2 ML: 200 SOLUTION ORAL; RESPIRATORY (INHALATION) at 08:14

## 2024-12-12 RX ADMIN — LEVOTHYROXINE SODIUM 88 MCG: 88 TABLET ORAL at 06:12

## 2024-12-12 RX ADMIN — METOCLOPRAMIDE HYDROCHLORIDE 10 MG: 5 SOLUTION ORAL at 21:49

## 2024-12-12 RX ADMIN — ACETYLCYSTEINE 2 ML: 200 SOLUTION ORAL; RESPIRATORY (INHALATION) at 14:16

## 2024-12-12 RX ADMIN — BRIVARACETAM 100 MG: 10 SOLUTION ORAL at 11:19

## 2024-12-12 RX ADMIN — Medication 1 PACKET: at 09:39

## 2024-12-12 RX ADMIN — METOCLOPRAMIDE HYDROCHLORIDE 10 MG: 5 SOLUTION ORAL at 09:28

## 2024-12-12 RX ADMIN — METOCLOPRAMIDE HYDROCHLORIDE 10 MG: 5 SOLUTION ORAL at 13:45

## 2024-12-12 RX ADMIN — BACITRACIN: 500 OINTMENT TOPICAL at 21:49

## 2024-12-12 RX ADMIN — ENOXAPARIN SODIUM 40 MG: 40 INJECTION SUBCUTANEOUS at 09:39

## 2024-12-12 RX ADMIN — METOCLOPRAMIDE HYDROCHLORIDE 10 MG: 5 SOLUTION ORAL at 18:06

## 2024-12-12 RX ADMIN — Medication 40 MG: at 09:29

## 2024-12-12 RX ADMIN — HYDROCORTISONE: 1 CREAM TOPICAL at 21:49

## 2024-12-12 RX ADMIN — ALBUTEROL SULFATE 2.5 MG: 2.5 SOLUTION RESPIRATORY (INHALATION) at 14:16

## 2024-12-12 RX ADMIN — CARBAMAZEPINE 150 MG: 100 SUSPENSION ORAL at 06:12

## 2024-12-12 ASSESSMENT — ACTIVITIES OF DAILY LIVING (ADL)
ADLS_ACUITY_SCORE: 93
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
## 2024-12-12 NOTE — PHARMACY-VANCOMYCIN DOSING SERVICE
Pharmacy Vancomycin Note  Date of Service 2024  Patient's  1960   64 year old, male    Indication: Sepsis  Day of Therapy: 5  Current vancomycin regimen:  750 mg IV q12h  Current vancomycin monitoring method: AUC  Current vancomycin therapeutic monitoring goal: 400-600 mg*h/L    Insight[employer] Prediction of Current Vancomycin Regimen  Loading dose: N/A  Regimen: 750 mg IV every 12 hours.  Start time: 21:55 on 2024  Exposure target: AUC24 (range)400-600 mg/L.hr   AUC24,ss: 460 mg/L.hr  Probability of AUC24 > 400: 90 %  Ctrough,ss: 15.2 mg/L  Probability of Ctrough,ss > 20: 4 %  Probability of nephrotoxicity (Lodise ERNESTO ): 10 %    Current estimated CrCl = Estimated Creatinine Clearance: 100.8 mL/min (based on SCr of 0.88 mg/dL).    Creatinine for last 3 days  2024:  6:45 AM Creatinine 0.86 mg/dL;  9:45 PM Creatinine 0.87 mg/dL  12/10/2024:  7:11 AM Creatinine 0.81 mg/dL  2024:  5:38 PM Creatinine 0.88 mg/dL    Recent Vancomycin Levels (past 3 days)  2024:  6:45 AM Vancomycin 16.9 ug/mL  2024:  5:38 PM Vancomycin 18.9 ug/mL    Vancomycin IV Administrations (past 72 hours)                     vancomycin (VANCOCIN) 750 mg in sodium chloride 0.9 % 282.5 mL intermittent infusion (mg) 750 mg New Bag 24 0955     750 mg New Bag 12/10/24 2151     750 mg New Bag  1028     750 mg New Bag 24 2239     750 mg New Bag  1101     750 mg New Bag 24 2239                    Nephrotoxins and other renal medications (From now, onward)      Start     Dose/Rate Route Frequency Ordered Stop    24 2230  vancomycin (VANCOCIN) 750 mg in sodium chloride 0.9 % 282.5 mL intermittent infusion         750 mg  over 120 Minutes Intravenous EVERY 12 HOURS 24 1201 24 1029               Contrast Orders - past 72 hours (72h ago, onward)      None            Interpretation of levels and current regimen:  Vancomycin level is reflective of -600    Has serum creatinine  changed greater than 50% in last 72 hours: No    Urine output:  unable to determine    Renal Function: Stable        Plan:  Continue Current Dose  Vancomycin monitoring method: AUC  Vancomycin therapeutic monitoring goal: 400-600 mg*h/L  Pharmacy will check vancomycin levels as appropriate in 1-3 Days.  Serum creatinine levels will be ordered a minimum of twice weekly.    Noa Flanagan RPH

## 2024-12-13 VITALS
RESPIRATION RATE: 16 BRPM | OXYGEN SATURATION: 97 % | BODY MASS INDEX: 25.94 KG/M2 | SYSTOLIC BLOOD PRESSURE: 128 MMHG | WEIGHT: 180.78 LBS | HEART RATE: 64 BPM | DIASTOLIC BLOOD PRESSURE: 65 MMHG | TEMPERATURE: 97.8 F

## 2024-12-13 LAB
ANION GAP SERPL CALCULATED.3IONS-SCNC: 9 MMOL/L (ref 7–15)
BACTERIA SPT CULT: ABNORMAL
BUN SERPL-MCNC: 21.4 MG/DL (ref 8–23)
CA-I BLD-MCNC: 4.2 MG/DL (ref 4.4–5.2)
CALCIUM SERPL-MCNC: 8.2 MG/DL (ref 8.8–10.4)
CHLORIDE SERPL-SCNC: 106 MMOL/L (ref 98–107)
CREAT SERPL-MCNC: 0.76 MG/DL (ref 0.67–1.17)
EGFRCR SERPLBLD CKD-EPI 2021: >90 ML/MIN/1.73M2
GLUCOSE BLDC GLUCOMTR-MCNC: 111 MG/DL (ref 70–99)
GLUCOSE BLDC GLUCOMTR-MCNC: 116 MG/DL (ref 70–99)
GLUCOSE BLDC GLUCOMTR-MCNC: 154 MG/DL (ref 70–99)
GLUCOSE SERPL-MCNC: 117 MG/DL (ref 70–99)
GRAM STAIN RESULT: ABNORMAL
GRAM STAIN RESULT: ABNORMAL
HCO3 SERPL-SCNC: 28 MMOL/L (ref 22–29)
MAGNESIUM SERPL-MCNC: 2.2 MG/DL (ref 1.7–2.3)
PHOSPHATE SERPL-MCNC: 3.2 MG/DL (ref 2.5–4.5)
PLATELET # BLD AUTO: 191 10E3/UL (ref 150–450)
POTASSIUM SERPL-SCNC: 3.7 MMOL/L (ref 3.4–5.3)
SODIUM SERPL-SCNC: 143 MMOL/L (ref 135–145)

## 2024-12-13 PROCEDURE — 250N000013 HC RX MED GY IP 250 OP 250 PS 637: Performed by: INTERNAL MEDICINE

## 2024-12-13 PROCEDURE — 85049 AUTOMATED PLATELET COUNT: CPT | Performed by: INTERNAL MEDICINE

## 2024-12-13 PROCEDURE — 84100 ASSAY OF PHOSPHORUS: CPT | Performed by: INTERNAL MEDICINE

## 2024-12-13 PROCEDURE — 99239 HOSP IP/OBS DSCHRG MGMT >30: CPT | Performed by: INTERNAL MEDICINE

## 2024-12-13 PROCEDURE — 36415 COLL VENOUS BLD VENIPUNCTURE: CPT | Performed by: INTERNAL MEDICINE

## 2024-12-13 PROCEDURE — 250N000011 HC RX IP 250 OP 636: Performed by: INTERNAL MEDICINE

## 2024-12-13 PROCEDURE — 94640 AIRWAY INHALATION TREATMENT: CPT

## 2024-12-13 PROCEDURE — 94640 AIRWAY INHALATION TREATMENT: CPT | Mod: 76

## 2024-12-13 PROCEDURE — 82330 ASSAY OF CALCIUM: CPT | Performed by: INTERNAL MEDICINE

## 2024-12-13 PROCEDURE — 250N000009 HC RX 250: Performed by: INTERNAL MEDICINE

## 2024-12-13 PROCEDURE — 999N000157 HC STATISTIC RCP TIME EA 10 MIN

## 2024-12-13 PROCEDURE — 80048 BASIC METABOLIC PNL TOTAL CA: CPT | Performed by: INTERNAL MEDICINE

## 2024-12-13 PROCEDURE — 250N000013 HC RX MED GY IP 250 OP 250 PS 637: Performed by: HOSPITALIST

## 2024-12-13 PROCEDURE — 83735 ASSAY OF MAGNESIUM: CPT | Performed by: INTERNAL MEDICINE

## 2024-12-13 RX ORDER — ACETYLCYSTEINE 200 MG/ML
2 SOLUTION ORAL; RESPIRATORY (INHALATION) 4 TIMES DAILY
Qty: 56 ML | Refills: 0 | Status: SHIPPED | OUTPATIENT
Start: 2024-12-13 | End: 2024-12-18

## 2024-12-13 RX ADMIN — INSULIN ASPART 1 UNITS: 100 INJECTION, SOLUTION INTRAVENOUS; SUBCUTANEOUS at 10:24

## 2024-12-13 RX ADMIN — HYDROCORTISONE: 1 CREAM TOPICAL at 08:43

## 2024-12-13 RX ADMIN — TESTOSTERONE CYPIONATE 50 MG: 200 INJECTION, SOLUTION INTRAMUSCULAR at 11:11

## 2024-12-13 RX ADMIN — CARBAMAZEPINE 150 MG: 100 SUSPENSION ORAL at 11:59

## 2024-12-13 RX ADMIN — ACETYLCYSTEINE 2 ML: 200 SOLUTION ORAL; RESPIRATORY (INHALATION) at 07:54

## 2024-12-13 RX ADMIN — METOCLOPRAMIDE HYDROCHLORIDE 10 MG: 5 SOLUTION ORAL at 11:59

## 2024-12-13 RX ADMIN — HYDROCORTISONE 15 MG: 10 TABLET ORAL at 08:42

## 2024-12-13 RX ADMIN — ALBUTEROL SULFATE 2.5 MG: 2.5 SOLUTION RESPIRATORY (INHALATION) at 10:57

## 2024-12-13 RX ADMIN — ALBUTEROL SULFATE 2.5 MG: 2.5 SOLUTION RESPIRATORY (INHALATION) at 07:54

## 2024-12-13 RX ADMIN — Medication 15 ML: at 08:43

## 2024-12-13 RX ADMIN — ENOXAPARIN SODIUM 40 MG: 40 INJECTION SUBCUTANEOUS at 08:42

## 2024-12-13 RX ADMIN — CARBAMAZEPINE 150 MG: 100 SUSPENSION ORAL at 06:15

## 2024-12-13 RX ADMIN — BRIVARACETAM 100 MG: 10 SOLUTION ORAL at 11:13

## 2024-12-13 RX ADMIN — METOCLOPRAMIDE HYDROCHLORIDE 10 MG: 5 SOLUTION ORAL at 08:44

## 2024-12-13 RX ADMIN — Medication 40 MG: at 08:42

## 2024-12-13 RX ADMIN — Medication 1 PACKET: at 08:42

## 2024-12-13 RX ADMIN — ACETYLCYSTEINE 2 ML: 200 SOLUTION ORAL; RESPIRATORY (INHALATION) at 10:58

## 2024-12-13 RX ADMIN — CARBAMAZEPINE 150 MG: 100 SUSPENSION ORAL at 00:09

## 2024-12-13 RX ADMIN — LEVOTHYROXINE SODIUM 88 MCG: 88 TABLET ORAL at 06:15

## 2024-12-13 ASSESSMENT — ACTIVITIES OF DAILY LIVING (ADL)
ADLS_ACUITY_SCORE: 89
ADLS_ACUITY_SCORE: 89
ADLS_ACUITY_SCORE: 93
ADLS_ACUITY_SCORE: 89
ADLS_ACUITY_SCORE: 93
ADLS_ACUITY_SCORE: 89
ADLS_ACUITY_SCORE: 93
ADLS_ACUITY_SCORE: 89

## 2024-12-13 NOTE — DISCHARGE SUMMARY
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 Culture Peripheral Blood [[id number]]   Peripheral Blood    Final result Component Value   Culture No Growth

## 2024-12-13 NOTE — PLAN OF CARE
Goal Outcome Evaluation:      Plan of Care Reviewed With: patient    Overall Patient Progress: improvingOverall Patient Progress: improving     SUMMARY: Hx TBI in distant past , frequent admissions with aspiration pneumonia    DATE & TIME: 12/12/2024 - 12/13/2024 4338-8937     Cognitive Concerns/ Orientation: Alert, unable to assess orientation, pt non verbal  BEHAVIOR & AGGRESSION TOOL COLOR: Green  ABNL VS/O2: VSS on RA  MOBILITY: Assist-2, Total care, turn and repo q2, HOB at least 30 degrees for tube feeding. Right sided hemiparesis baseline.  PAIN MANAGMENT: No signs of pain this shift  DIET: Strict NPO, tube feeding running through J tube at 70mL/hr (Goal rate);  mL/hr programmed, increased from 300ml due to 400 d/t elevated NA  BOWEL/BLADDER: Incontinent of bowel and bladder; external cath in place  ABNL LAB/BG: K 3.7, Mg 2.4, Phos 3.0 rechecks ordered for am; , 116, 111  DRAIN/DEVICES: L PIV SL, GJ tube   TELEMETRY RHYTHM: N/a  SKIN: Scattered bruises, Pressure injury on sacrum scabbed/red (clean with sarah/apply barrier cream and leave ANANYA per WOC); scattered scabs including R 2nd toe  TESTS/PROCEDURES: None this shift   D/C DATE: 12/13 pending sodium  OTHER IMPORTANT INFO: WOC/social work/nutrition are following.

## 2024-12-13 NOTE — PLAN OF CARE
Goal Outcome Evaluation:    SUMMARY: Hx TBI in distant past , frequent admissions with aspiration pneumonia    DATE & TIME: 2024 [id number]      Cognitive Concerns/ Orientation: Alert, unable to assess orientation, pt non verbal  BEHAVIOR & AGGRESSION TOOL COLOR: Green  ABNL VS/O2: VSS on RA   MOBILITY: Assist-2, Total care, turn and repo q2, HOB at least 30 degrees for tube feeding. Right sided hemiparesis baseline.  PAIN MANAGMENT: No signs of pain this shift  DIET: Strict NPO, tube feeding running through J tube at 70mL/hr (Goal rate);  mL/hr programmed, increased from 300ml due to elevated NA  BOWEL/BLADDER: Incontinent of bowel and bladder; external cath in place, 1 BM this shift   ABNL LAB/BG: K 3.7, Mg 2., Phos 3.0 rechecks ordered for am. B, 159, 135  DRAIN/DEVICES: L PIV SL, GJ tube   TELEMETRY RHYTHM: N/a  SKIN: Scattered bruises, Pressure injury on sacrum scabbed/red (clean with sarah/apply barrier cream and leave ANANYA per WOC); scattered scabs including R 2nd toe  TESTS/PROCEDURES: None this shift   D/C DATE: Tomorrow pending sodium    OTHER IMPORTANT INFO: WOC/social work/nutrition are following.
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
## 2024-12-13 NOTE — PLAN OF CARE
Goal Outcome Evaluation:  Discharge    Patient discharged to home via stretcher  Care plan note: Patient alert/oriented, able to give thumbs up. Lungs diminished on RA. Vss. Denies pain. Discharging to home with his mother/HHC. Discharge papers sent with patient.     Listed belongings gathered and given to patient (including from security/pharmacy). Yes  Care Plan and Patient education resolved: Yes  Prescriptions if needed, hard copies sent with patient  NA  Medication Bin checked and emptied on discharge Yes  SW/care coordinator/charge RN aware of discharge: Yes

## 2024-12-13 NOTE — PROGRESS NOTES
CLINICAL NUTRITION SERVICES - REASSESSMENT NOTE    Malnutrition: (12/7)  % Weight Loss:  None noted  % Intake:  Decreased intake does not meet criteria for malnutrition   Subcutaneous Fat Loss:  None observed  Muscle Loss:  Scapular bone region mild depletion, Patellar region mild depletion, Anterior thigh region mild depletion, and Posterior calf region mild depletion  Fluid Retention:  None noted     Malnutrition Diagnosis: Patient does not meet two of the above criteria necessary for diagnosing malnutrition     EVALUATION OF PROGRESS TOWARD GOALS   Diet:  NPO    Nutrition Support:    [place] Standard 1.4 @ 70 ml/hr x 22 hrs via Jtube (hold 1 hr before and after levothyroxine). This provides 1540 ml/day, 2156 kcal (29 kcal/kg), 95 gm protein (1.3 gm/kg), 241 gm CHO, 1093 ml free water, 23 gm fiber.   FWF increased to 400 mL q4h (12/12) due to high Na    ASSESSED NUTRITION NEEDS:  Dosing Weight 74 kg (actual, admit wt on 12/7)   Estimated Energy Needs: 3524-8868 kcals (25-30 Kcal/Kg)  Justification: maintenance  Estimated Protein Needs:  grams protein (1.2-1.5 g pro/Kg)  Justification: hypercatabolism with acute illness  Estimated Fluid Needs: 7118-1009+  mL (1 mL/Kcal)  Justification: maintenance    NEW FINDINGS:   Labs:   Na 154 (12/10) --> 148 (12/11) --> 148 (12/12) --> 143 (12/13)    Meds:  Medium sliding scale insulin  Levothyroxine  Reglan  Liquid MVI/M  Miralax    Skin: WOC following (12/9) ~  Wound location: PEG tube site   Wound due to: Moisture Associated Skin Damage (MASD), possible pressure component   STATUS: initial assessment     Dispo: possibly today pending Na    Wound location: Coccyx area   Wound due to: Friction and Incontinence Associated Dermatitis (IAD) and possible pressure component   STATUS: initial assessment     Previous Goals:   TF to advance to goal rate within 24-48 hours.   Evaluation: Met    Previous Nutrition Diagnosis:   Inadequate enteral nutrition infusion related to  NPO with GJtube dependence as evidenced by currently meeting 0% nutrition needs without enteral nutrition infusion.   Evaluation: No change    CURRENT NUTRITION DIAGNOSIS  No nutrition diagnosis identified at this time as meeting needs with TF    INTERVENTIONS  Recommendations / Nutrition Prescription  Continue current TF regimen    Implementation  EN - continue    Goals  EN to meet % estimated needs at goal    MONITORING AND EVALUATION:  Progress towards goals will be monitored and evaluated per protocol and Practice Guidelines    Sun Abad RD, LD  Clinical Dietitian - [facility]

## 2024-12-16 ENCOUNTER — PATIENT OUTREACH (OUTPATIENT)
Dept: CARE COORDINATION | Facility: CLINIC | Age: 64
End: 2024-12-16
Payer: MEDICARE

## 2024-12-16 ENCOUNTER — MEDICAL CORRESPONDENCE (OUTPATIENT)
Dept: HEALTH INFORMATION MANAGEMENT | Facility: CLINIC | Age: 64
End: 2024-12-16

## 2024-12-16 NOTE — PROGRESS NOTES
Clinic Care Coordination Contact  [facility]/Voicemail    Clinical Data: Care Coordinator Outreach    Outreach Documentation Number of Outreach Attempt   12/16/2024  12:52 PM 1       Left message on patient's voicemail with call back information and requested return call.      Plan: Care Coordinator will try to reach patient again in 1-2 business days.    Courtney Nuñez RN Clinic Care Coordinator  [facility]: [place], [place] (on-site Wednesdays), [facility] (on-site Thursdays) & [facility].  [email or web address]  Phone: [phone]

## 2024-12-16 NOTE — LETTER
[facility] CARE COORDINATION  [address] [place] [address]    December 17, 2024    Keyon Farias  [address] [place] [address]      Dear Savannah Ta,    I am a clinic care coordinator who works with Elsa Queen MD with the [facility]. I recently tried to call to follow up on Keyon's hospital stay and was unable to reach you.     Below is a description of clinic care coordination and how I can further assist you I you are interested.      The clinic care coordination team is made up of a registered nurse, , financial resource worker and community health worker who understand the health care system. The goal of clinic care coordination is to help you manage your health and improve access to the health care system. Our team works alongside your provider to assist you in determining your health and social needs. We can help you obtain health care and community resources, providing you with necessary information and education. We can work with you through any barriers and develop a care plan that helps coordinate and strengthen the communication between you and your care team.  Our services are voluntary and are offered without charge to you personally.    Please feel free to contact me with any questions or concerns regarding care coordination and what we can offer.      We are focused on providing you with the highest-quality healthcare experience possible.    Sincerely,     Courtney Nuñez RN Clinic Care Coordinator  [facility]: [place], [place] (on-site Wednesdays), [facility] (on-site Thursdays) & [facility].  [email or web address]  Phone: [phone]

## 2024-12-17 NOTE — PROGRESS NOTES
Clinic Care Coordination Contact  [facility]/Voicemail    Clinical Data: Care Coordinator Outreach    Outreach Documentation Number of Outreach Attempt   12/16/2024  12:52 PM 1   12/17/2024  11:31 AM 2       Left message on patient's voicemail with call back information and requested return call.      Plan: Care Coordinator will send unable to contact letter with care coordinator contact information via mail. Care Coordinator will do no further outreaches at this time.    Courtney Nuñez RN Clinic Care Coordinator  [facility]: [place], [place] (on-site Wednesdays), [facility] (on-site Thursdays) & [facility].  [email or web address]  Phone: [phone]

## 2024-12-18 ENCOUNTER — OFFICE VISIT (OUTPATIENT)
Dept: PULMONOLOGY | Facility: CLINIC | Age: 64
End: 2024-12-18
Payer: MEDICARE

## 2024-12-18 ENCOUNTER — HOSPITAL ENCOUNTER (OUTPATIENT)
Dept: INTERVENTIONAL RADIOLOGY/VASCULAR | Facility: HOSPITAL | Age: 64
Discharge: HOME | End: 2024-12-18
Attending: NURSE PRACTITIONER | Admitting: RADIOLOGY
Payer: MEDICARE

## 2024-12-18 VITALS
RESPIRATION RATE: 22 BRPM | SYSTOLIC BLOOD PRESSURE: 110 MMHG | DIASTOLIC BLOOD PRESSURE: 76 MMHG | HEART RATE: 69 BPM | OXYGEN SATURATION: 99 %

## 2024-12-18 VITALS
TEMPERATURE: 97.4 F | DIASTOLIC BLOOD PRESSURE: 68 MMHG | HEART RATE: 73 BPM | OXYGEN SATURATION: 100 % | SYSTOLIC BLOOD PRESSURE: 142 MMHG | RESPIRATION RATE: 20 BRPM

## 2024-12-18 DIAGNOSIS — J69.0 ASPIRATION PNEUMONITIS (H): Primary | ICD-10-CM

## 2024-12-18 DIAGNOSIS — Z93.4 GASTROJEJUNOSTOMY TUBE STATUS (H): ICD-10-CM

## 2024-12-18 DIAGNOSIS — R13.10 DYSPHAGIA, UNSPECIFIED TYPE: Primary | ICD-10-CM

## 2024-12-18 PROCEDURE — 49452 REPLACE G-J TUBE PERC: CPT

## 2024-12-18 PROCEDURE — 99204 OFFICE O/P NEW MOD 45 MIN: CPT | Performed by: STUDENT IN AN ORGANIZED HEALTH CARE EDUCATION/TRAINING PROGRAM

## 2024-12-18 PROCEDURE — C1769 GUIDE WIRE: HCPCS

## 2024-12-18 PROCEDURE — 272N000117 HC CATH CR2

## 2024-12-18 RX ORDER — ALBUTEROL SULFATE 5 MG/ML
2.5 SOLUTION RESPIRATORY (INHALATION) EVERY 6 HOURS PRN
Qty: 240 ML | Refills: 5 | Status: SHIPPED | OUTPATIENT
Start: 2024-12-18

## 2024-12-18 RX ORDER — ACETYLCYSTEINE 200 MG/ML
2 SOLUTION ORAL; RESPIRATORY (INHALATION) 4 TIMES DAILY
Qty: 90 ML | Refills: 11 | Status: SHIPPED | OUTPATIENT
Start: 2024-12-18
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
## 2024-12-18 NOTE — PATIENT INSTRUCTIONS
Continue Mucomyst nebs 2-3x per day with albuterol nebs. After use Aerobika device for airway clearance.
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
## 2024-12-18 NOTE — NURSING NOTE
Chief Complaint   Patient presents with    New Patient       Vitals:    12/18/24 1223   BP: 110/76   BP Location: Left arm   Patient Position: Sitting   Cuff Size: Adult Regular   Pulse: 69   Resp: 22   SpO2: 99%       There is no height or weight on file to calculate BMI.

## 2024-12-23 ENCOUNTER — HOSPITAL ENCOUNTER (EMERGENCY)
Facility: CLINIC | Age: 64
Discharge: HOME | End: 2024-12-24
Attending: EMERGENCY MEDICINE
Payer: MEDICARE

## 2024-12-23 DIAGNOSIS — R50.9 FEVER, UNSPECIFIED FEVER CAUSE: ICD-10-CM

## 2024-12-23 PROCEDURE — 93005 ELECTROCARDIOGRAM TRACING: CPT

## 2024-12-23 PROCEDURE — 99285 EMERGENCY DEPT VISIT HI MDM: CPT | Mod: 25

## 2024-12-23 ASSESSMENT — COLUMBIA-SUICIDE SEVERITY RATING SCALE - C-SSRS: IS THE PATIENT NOT ABLE TO COMPLETE C-SSRS: UNABLE TO VERBALIZE

## 2024-12-24 ENCOUNTER — APPOINTMENT (OUTPATIENT)
Dept: GENERAL RADIOLOGY | Facility: CLINIC | Age: 64
End: 2024-12-24
Attending: EMERGENCY MEDICINE
Payer: MEDICARE

## 2024-12-24 VITALS
RESPIRATION RATE: 18 BRPM | SYSTOLIC BLOOD PRESSURE: 117 MMHG | DIASTOLIC BLOOD PRESSURE: 76 MMHG | OXYGEN SATURATION: 97 % | TEMPERATURE: 97.1 F | HEART RATE: 98 BPM

## 2024-12-24 LAB
ALBUMIN SERPL BCG-MCNC: 3.7 G/DL (ref 3.5–5.2)
ALBUMIN UR-MCNC: 20 MG/DL
ALP SERPL-CCNC: 92 U/L (ref 40–150)
ALT SERPL W P-5'-P-CCNC: 28 U/L (ref 0–70)
ANION GAP SERPL CALCULATED.3IONS-SCNC: 13 MMOL/L (ref 7–15)
APPEARANCE UR: CLEAR
AST SERPL W P-5'-P-CCNC: 24 U/L (ref 0–45)
ATRIAL RATE - MUSE: 105 BPM
BASE EXCESS BLDV CALC-SCNC: 6 MMOL/L (ref -3–3)
BASOPHILS # BLD AUTO: 0.2 10E3/UL (ref 0–0.2)
BASOPHILS NFR BLD AUTO: 1 %
BILIRUB SERPL-MCNC: 0.3 MG/DL
BILIRUB UR QL STRIP: NEGATIVE
BUN SERPL-MCNC: 32.7 MG/DL (ref 8–23)
CALCIUM SERPL-MCNC: 8.8 MG/DL (ref 8.8–10.4)
CHLORIDE SERPL-SCNC: 99 MMOL/L (ref 98–107)
COLOR UR AUTO: YELLOW
CREAT SERPL-MCNC: 1.2 MG/DL (ref 0.67–1.17)
DIASTOLIC BLOOD PRESSURE - MUSE: NORMAL MMHG
EGFRCR SERPLBLD CKD-EPI 2021: 68 ML/MIN/1.73M2
EOSINOPHIL # BLD AUTO: 0.3 10E3/UL (ref 0–0.7)
EOSINOPHIL NFR BLD AUTO: 2 %
ERYTHROCYTE [DISTWIDTH] IN BLOOD BY AUTOMATED COUNT: 15.5 % (ref 10–15)
FLUAV RNA SPEC QL NAA+PROBE: NEGATIVE
FLUBV RNA RESP QL NAA+PROBE: NEGATIVE
GLUCOSE SERPL-MCNC: 114 MG/DL (ref 70–99)
GLUCOSE UR STRIP-MCNC: NEGATIVE MG/DL
HCO3 BLDV-SCNC: 32 MMOL/L (ref 21–28)
HCO3 SERPL-SCNC: 29 MMOL/L (ref 22–29)
HCT VFR BLD AUTO: 45.8 % (ref 40–53)
HGB BLD-MCNC: 14.6 G/DL (ref 13.3–17.7)
HGB UR QL STRIP: NEGATIVE
IMM GRANULOCYTES # BLD: 0.1 10E3/UL
IMM GRANULOCYTES NFR BLD: 1 %
INTERPRETATION ECG - MUSE: NORMAL
KETONES UR STRIP-MCNC: ABNORMAL MG/DL
LACTATE BLD-SCNC: 2 MMOL/L
LEUKOCYTE ESTERASE UR QL STRIP: NEGATIVE
LYMPHOCYTES # BLD AUTO: 4.6 10E3/UL (ref 0.8–5.3)
LYMPHOCYTES NFR BLD AUTO: 25 %
MCH RBC QN AUTO: 27.9 PG (ref 26.5–33)
MCHC RBC AUTO-ENTMCNC: 31.9 G/DL (ref 31.5–36.5)
MCV RBC AUTO: 88 FL (ref 78–100)
MONOCYTES # BLD AUTO: 1.4 10E3/UL (ref 0–1.3)
MONOCYTES NFR BLD AUTO: 8 %
NEUTROPHILS # BLD AUTO: 11.8 10E3/UL (ref 1.6–8.3)
NEUTROPHILS NFR BLD AUTO: 64 %
NITRATE UR QL: NEGATIVE
NRBC # BLD AUTO: 0 10E3/UL
NRBC BLD AUTO-RTO: 0 /100
P AXIS - MUSE: 63 DEGREES
PCO2 BLDV: 47 MM HG (ref 40–50)
PH BLDV: 7.44 [PH] (ref 7.32–7.43)
PH UR STRIP: 6.5 [PH] (ref 5–7)
PLATELET # BLD AUTO: 235 10E3/UL (ref 150–450)
PO2 BLDV: 36 MM HG (ref 25–47)
POTASSIUM SERPL-SCNC: 3.6 MMOL/L (ref 3.4–5.3)
PR INTERVAL - MUSE: 170 MS
PROT SERPL-MCNC: 7.9 G/DL (ref 6.4–8.3)
QRS DURATION - MUSE: 86 MS
QT - MUSE: 378 MS
QTC - MUSE: 499 MS
R AXIS - MUSE: -47 DEGREES
RBC # BLD AUTO: 5.23 10E6/UL (ref 4.4–5.9)
RBC URINE: <1 /HPF
RSV RNA SPEC NAA+PROBE: NEGATIVE
SAO2 % BLDV: 70 % (ref 70–75)
SARS-COV-2 RNA RESP QL NAA+PROBE: NEGATIVE
SODIUM SERPL-SCNC: 141 MMOL/L (ref 135–145)
SP GR UR STRIP: 1.03 (ref 1–1.03)
SYSTOLIC BLOOD PRESSURE - MUSE: NORMAL MMHG
T AXIS - MUSE: 96 DEGREES
UROBILINOGEN UR STRIP-MCNC: 2 MG/DL
VENTRICULAR RATE- MUSE: 105 BPM
WBC # BLD AUTO: 18.4 10E3/UL (ref 4–11)
WBC URINE: <1 /HPF

## 2024-12-24 PROCEDURE — 87086 URINE CULTURE/COLONY COUNT: CPT | Performed by: EMERGENCY MEDICINE

## 2024-12-24 PROCEDURE — 87637 SARSCOV2&INF A&B&RSV AMP PRB: CPT | Performed by: EMERGENCY MEDICINE

## 2024-12-24 PROCEDURE — 36415 COLL VENOUS BLD VENIPUNCTURE: CPT | Performed by: EMERGENCY MEDICINE

## 2024-12-24 PROCEDURE — 71046 X-RAY EXAM CHEST 2 VIEWS: CPT

## 2024-12-24 PROCEDURE — 85025 COMPLETE CBC W/AUTO DIFF WBC: CPT | Performed by: EMERGENCY MEDICINE

## 2024-12-24 PROCEDURE — 81001 URINALYSIS AUTO W/SCOPE: CPT | Performed by: EMERGENCY MEDICINE

## 2024-12-24 PROCEDURE — 96361 HYDRATE IV INFUSION ADD-ON: CPT

## 2024-12-24 PROCEDURE — 96360 HYDRATION IV INFUSION INIT: CPT

## 2024-12-24 PROCEDURE — 87040 BLOOD CULTURE FOR BACTERIA: CPT | Performed by: EMERGENCY MEDICINE

## 2024-12-24 PROCEDURE — 250N000013 HC RX MED GY IP 250 OP 250 PS 637: Performed by: EMERGENCY MEDICINE

## 2024-12-24 PROCEDURE — 83605 ASSAY OF LACTIC ACID: CPT

## 2024-12-24 PROCEDURE — 258N000003 HC RX IP 258 OP 636: Performed by: EMERGENCY MEDICINE

## 2024-12-24 PROCEDURE — 80053 COMPREHEN METABOLIC PANEL: CPT | Performed by: EMERGENCY MEDICINE

## 2024-12-24 RX ORDER — AMOXICILLIN AND CLAVULANATE POTASSIUM 400; 57 MG/5ML; MG/5ML
875 POWDER, FOR SUSPENSION ORAL ONCE
Status: COMPLETED | OUTPATIENT
Start: 2024-12-24 | End: 2024-12-24

## 2024-12-24 RX ORDER — DOXYCYCLINE 25 MG/5ML
100 POWDER, FOR SUSPENSION ORAL ONCE
Status: COMPLETED | OUTPATIENT
Start: 2024-12-24 | End: 2024-12-24

## 2024-12-24 RX ORDER — DOXYCYCLINE HYCLATE 100 MG
100 TABLET ORAL 2 TIMES DAILY
Qty: 14 TABLET | Refills: 0 | Status: SHIPPED | OUTPATIENT
Start: 2024-12-24 | End: 2024-12-31

## 2024-12-24 RX ORDER — AMOXICILLIN AND CLAVULANATE POTASSIUM 400; 57 MG/5ML; MG/5ML
875 POWDER, FOR SUSPENSION ORAL 2 TIMES DAILY
Qty: 153.16 ML | Refills: 0 | Status: SHIPPED | OUTPATIENT
Start: 2024-12-24 | End: 2024-12-31

## 2024-12-24 RX ADMIN — DOXYCYCLINE 100 MG: 25 FOR SUSPENSION ORAL at 06:14

## 2024-12-24 RX ADMIN — AMOXICILLIN AND CLAVULANATE POTASSIUM 875 MG: 400; 57 POWDER, FOR SUSPENSION ORAL at 06:14

## 2024-12-24 RX ADMIN — SODIUM CHLORIDE, PRESERVATIVE FREE 1000 ML: 5 INJECTION INTRAVENOUS at 02:40

## 2024-12-24 ASSESSMENT — ACTIVITIES OF DAILY LIVING (ADL)
ADLS_ACUITY_SCORE: 61

## 2024-12-24 NOTE — ED NOTES
Bed: ED19  Expected date:   Expected time:   Means of arrival:   Comments:  [place] wait outside 26 64M/ nonambulatory baseline 103.4 fever

## 2024-12-24 NOTE — ED PROVIDER NOTES
Emergency Department Note      History of Present Illness     Chief Complaint   No chief complaint on file.      HPI   Keyon Farias is a 64 year old male with a history of TBI, DVT, sepsis, seizure, spastic hemiplegia, and hyperlipidemia who presents to the ED via EMS from home for evaluation of a fever. Per EMS report, the patient was noted to have a fever of 103F today by his mother, whom is his primary caregiver and lives with him. His living quarters were noted to be unkempt and he appeared to have lost weight by EMS. His mother reports he has had a fever for 3 days with the last recorded temperature of 101.1F. He had one episode of emesis yesterday but does not think he aspirated. She last administered 500mg Tylenol at 2230 today. Patient denies any pain.    History is limited due to the patient's condition.    Independent Historian   Mother and EMS as detailed above.    Review of External Notes   I reviewed the discharge summary from 12/13/24. The patient was admitted for pneumonia suspected to be due to aspiration.    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  DVT  GERD  Panhypopituitarism  Pneumonia  Seizures  Sepsis  Septic shock  Spastic hemiplegia  Thyroid disease  Cerebral infarction  Thrombocytopenia  Leukocytosis  Esophagitis  Dysphagia  Pancreatitis  MI  Hyperlipidemia  Bladder calculus  Hemiparesis  Panhypopituitarism  Atelectasis  Hyponatremia    Medications   Albuterol  Cortef  Protonix  Briviact  Tegretol  Levothyroxine  Reglan  Depotestosterone    Surgical History   Head and neck surgery   GJ tube change   PICC exchange  Appendectomy  Right hand repair  Tracheostomy  Vascular surgery    Physical Exam     Patient Vitals for the past 24 hrs:   BP Temp Temp src Pulse Resp SpO2   12/24/24 0630 117/76 -- -- 98 18 97 %   12/24/24 0600 124/85 -- -- 102 18 94 %   12/24/24 0430 121/74 -- -- 104 20 95 %   12/24/24 0416 -- 97.1  F (36.2  C) Temporal -- -- --    12/24/24 0400 119/74 -- -- 103 18 93 %   12/24/24 0330 121/75 -- -- 103 18 --   12/24/24 0300 125/68 -- -- 106 18 --   12/24/24 0200 93/72 -- -- 110 20 90 %   12/24/24 0000 113/73 -- -- -- 20 91 %   12/23/24 2356 113/73 98.3  F (36.8  C) Temporal 116 22 92 %     Physical Exam  General: Sitting up in bed  Eyes:  The pupils are equal and round    Conjunctivae and sclerae are normal  ENT:    Atraumatic face, mouth wide open, dry mucous membranes  Neck:  Normal range of motion  CV:  Tachycardic rate, regular rhythm    Skin warm and well perfused   Resp:  Non labored breathing on room air    No tachypnea    No cough heard    Lungs clear bilaterally  GI:  Abdomen is soft, there is no rigidity    No distension    No rebound tenderness     No abdominal tenderness    Feeding tube in LUQ  MS:  Contractures  Skin:  Mild erythema on buttocks with no ulcers or signs of infection  Neuro:   Awake, alert.      Nods his head yes or no to questions    Face is symmetric.     Moves all extremities equally    Diagnostics     Lab Results   Labs Ordered and Resulted from Time of ED Arrival to Time of ED Departure   COMPREHENSIVE METABOLIC PANEL - Abnormal       Result Value    Sodium 141      Potassium 3.6      Carbon Dioxide (CO2) 29      Anion Gap 13      Urea Nitrogen 32.7 (*)     Creatinine 1.20 (*)     GFR Estimate 68      Calcium 8.8      Chloride 99      Glucose 114 (*)     Alkaline Phosphatase 92      AST 24      ALT 28      Protein Total 7.9      Albumin 3.7      Bilirubin Total 0.3     ROUTINE UA WITH MICROSCOPIC - Abnormal    Color Urine Yellow      Appearance Urine Clear      Glucose Urine Negative      Bilirubin Urine Negative      Ketones Urine Trace (*)     Specific Gravity Urine 1.032      Blood Urine Negative      pH Urine 6.5      Protein Albumin Urine 20 (*)     Urobilinogen Urine 2.0      Nitrite Urine Negative      Leukocyte Esterase Urine Negative      RBC Urine <1      WBC Urine <1     CBC WITH PLATELETS AND  DIFFERENTIAL - Abnormal    WBC Count 18.4 (*)     RBC Count 5.23      Hemoglobin 14.6      Hematocrit 45.8      MCV 88      MCH 27.9      MCHC 31.9      RDW 15.5 (*)     Platelet Count 235      % Neutrophils 64      % Lymphocytes 25      % Monocytes 8      % Eosinophils 2      % Basophils 1      % Immature Granulocytes 1      NRBCs per 100 WBC 0      Absolute Neutrophils 11.8 (*)     Absolute Lymphocytes 4.6      Absolute Monocytes 1.4 (*)     Absolute Eosinophils 0.3      Absolute Basophils 0.2      Absolute Immature Granulocytes 0.1      Absolute NRBCs 0.0     ISTAT GASES LACTATE VENOUS POCT - Abnormal    Lactic Acid POCT 2.0      Bicarbonate Venous POCT 32 (*)     O2 Sat, Venous POCT 70      pCO2 Venous POCT 47      pH Venous POCT 7.44 (*)     pO2 Venous POCT 36      Base Excess/Deficit (+/-) POCT 6.0 (*)    INFLUENZA A/B, RSV AND SARS-COV2 PCR - Normal    Influenza A PCR Negative      Influenza B PCR Negative      RSV PCR Negative      SARS CoV2 PCR Negative     BLOOD CULTURE   BLOOD CULTURE   URINE CULTURE       Imaging   XR Chest 2 Views   Final Result   IMPRESSION: Minimal fine fibrosis/atelectasis in both lung bases. Slight elevation right hemidiaphragm. No lung infiltrates, pneumothorax, or active CHF. No significant changes since 12/06/2024 chest radiograph.        Independent Interpretation   CXR: No pneumothorax.    ED Course      Medications Administered   Medications   sodium chloride 0.9% BOLUS 1,000 mL (0 mLs Intravenous Stopped 12/24/24 0547)   amoxicillin-clavulanate (AUGMENTIN) 400-57 MG/5ML suspension 875 mg (875 mg Oral $Given 12/24/24 0614)   doxycycline monohydrate (VIBRAMYCIN) suspension 100 mg (100 mg Oral $Given 12/24/24 0614)     ED Course   ED Course as of 12/24/24 0011   Tue Dec 24, 2024   0002 I spoke with the patient's mother regarding the patient's presentation and plan of care.        Additional Documentation  None    Medical Decision Making / Diagnosis     ARSENIO Farias is a 64  year old male who presented to the ED with fever. Patient at baseline mentation. Mother reports fever. She would like him to come home if possible No clear cause of fever found in ED today. No abdominal tenderness on palpation of abdomen and does not seem to have abdominal pain or be in pain. Viral testing negative. Blood cultures in process. UA not consistent with UTI. Chest xray with no infiltrates. Given history, possible that this represents early aspiration pneumonia versus viral process. But patient not hypoxic, tachycardia improved, lactic normal. Less likely bacteremia causing fever. Mother would like him to come if possible so will discharge back home given no clear source of infection found. Mother and I discussed antibiotics and will do antibiotics for possible aspiration pneumonia. Mother will have patient come back to ED if worsening symptoms.     Disposition   The patient was discharged.     Diagnosis     ICD-10-CM    1. Fever, unspecified fever cause  R50.9            Discharge Medications   Discharge Medication List as of 12/24/2024  6:36 AM        START taking these medications    Details   amoxicillin-clavulanate (AUGMENTIN) 400-57 MG/5ML suspension Take 10.94 mLs (875 mg) by mouth or Feeding Tube 2 times daily for 7 days., Disp-153.16 mL, R-0, E-Prescribe      doxycycline hyclate (VIBRA-TABS) 100 MG tablet Take 1 tablet (100 mg) by mouth or Feeding Tube 2 times daily for 7 days., Disp-14 tablet, R-0, E-Prescribe             Scribe Disclosure:  Marina DYKES, am serving as a scribe at 11:52 PM on 12/23/2024 to document services personally performed by Tamara Thomas MD based on my observations and the provider's statements to me.        Tamara Thomas MD  12/24/24 [id number]

## 2024-12-24 NOTE — DISCHARGE INSTRUCTIONS
No clear cause of infection found today  Will start on antibiotics to treat for possible pneumonia  Return to emergency department if not improving

## 2024-12-26 LAB
BACTERIA BLD CULT: NORMAL
BACTERIA BLD CULT: NORMAL
BACTERIA UR CULT: NORMAL

## 2024-12-27 ENCOUNTER — MEDICAL CORRESPONDENCE (OUTPATIENT)
Dept: HEALTH INFORMATION MANAGEMENT | Facility: CLINIC | Age: 64
End: 2024-12-27
Payer: MEDICARE

## 2024-12-29 LAB
BACTERIA BLD CULT: NO GROWTH
BACTERIA BLD CULT: NO GROWTH

## 2024-12-30 ENCOUNTER — VIRTUAL VISIT (OUTPATIENT)
Dept: PHARMACY | Facility: CLINIC | Age: 64
End: 2024-12-30
Attending: INTERNAL MEDICINE
Payer: MEDICAID

## 2024-12-30 DIAGNOSIS — J96.01 ACUTE RESPIRATORY FAILURE WITH HYPOXIA (H): Primary | ICD-10-CM

## 2024-12-30 DIAGNOSIS — E87.1 HYPONATREMIA: ICD-10-CM

## 2024-12-30 DIAGNOSIS — R52 INTERMITTENT PAIN: ICD-10-CM

## 2024-12-30 DIAGNOSIS — Z78.9 TAKES DIETARY SUPPLEMENTS: ICD-10-CM

## 2024-12-30 DIAGNOSIS — E29.1 HYPOGONADISM MALE: ICD-10-CM

## 2024-12-30 DIAGNOSIS — G40.909 RECURRENT SEIZURES (H): ICD-10-CM

## 2024-12-30 DIAGNOSIS — K21.9 GASTROESOPHAGEAL REFLUX DISEASE, UNSPECIFIED WHETHER ESOPHAGITIS PRESENT: ICD-10-CM

## 2024-12-30 DIAGNOSIS — E23.0 PANHYPOPITUITARISM (H): ICD-10-CM

## 2024-12-30 DIAGNOSIS — R13.10 DYSPHAGIA, UNSPECIFIED TYPE: ICD-10-CM

## 2024-12-30 DIAGNOSIS — E03.9 HYPOTHYROIDISM, UNSPECIFIED TYPE: ICD-10-CM

## 2024-12-30 PROCEDURE — 99207 PR NO CHARGE LOS: CPT | Mod: 93 | Performed by: PHARMACIST

## 2024-12-30 NOTE — PROGRESS NOTES
"Medication Therapy Management (MTM) Encounter    ASSESSMENT:                            Medication Adherence/Access: See below for considerations.    Acute Respiratory Failure/Hyponatremia/Dysphagia: Improved.      History of traumatic brain injury/Recurrent seizures: Stable.     GERD: Stable.      Hypothyroidism: Stable. Last TSH is within normal limits.     Secondary adrenal insufficiency/Hypogonadism: Stable.     Pain:  Stable.      Supplements: Stable.     PLAN:                            Continue to take your current medications as prescribed.     Follow-up: As Needed    SUBJECTIVE/OBJECTIVE:                          Keyon Farias is a 64 year old male seen for a transitions of care visit. He was discharged from [facility] on 12/13/2024 for bilateral pneumonia with severe sepsis. Joined on call today by mother/caregiver Savannah. Patient is non-verbal but present during visit.     Reason for visit: Hospital Follow-up.    Allergies/ADRs: Reviewed in chart  Past Medical History: Reviewed in chart  Tobacco: He reports that he quit smoking about 35 years ago. His smoking use included cigarettes. He has never used smokeless tobacco.  Alcohol: none    Medication Adherence/Access: No issues reported.  Medications are administered via G-tube by mother    Acute Respiratory Failure/Hyponatremia/Dysphagia: Savannah reports that he's doing better following discharge and today he is fever free after 6-7 days of doxycycline and Augmentin, no questions or major concerns for medications. Holding sodium chloride and does take potassium/sodium phosphates packet daily (they pay out of pocket for these - price was better this time)  Keyon also receives albuterol nebs a few times a day. When he's \"rattly\" - Savannah uses scopolamine compounded capsules three times daily as needed/glycopyrrolate and/or Mucinex.  No side effects reported.  Saavnnah reports Keyon's breathing has been stable. For G-tube care, Savannah uses " bacitracin, mupirocin, or hydrocortisone as needed.  She reports this is effective.      History of traumatic brain injury/Recurrent seizures: Patient is taking carbamazepine 150 mg three times daily (0600, 1200, 2400)/carbamazepine 100 mg once daily at 1800 and brivaracetam 100 mg twice daily at 0900/2100.  Sees outside neurologist. Denies any recent episodes.     GERD    Current medications include: Protonix (pantoprazole) 40 mg twice daily and metoclopramide three-four times daily (although prescribed for four times daily). Savannah reports no current symptoms and indicates current regimen is effective.       Hypothyroidism   Levothyroxine 88 mcg daily (dose recently decreased)  Patient is having the following symptoms: none.      TSH   Date Value Ref Range Status   11/29/2024 <0.01 (L) 0.30 - 4.20 uIU/mL Final   05/09/2022 <0.01 (L) 0.40 - 4.00 mU/L Final   07/05/2018 <0.01 (L) 0.40 - 4.00 mU/L Final     T4 Free   Date Value Ref Range Status   04/22/2021 1.40 0.76 - 1.46 ng/dL Final     Free T4   Date Value Ref Range Status   11/29/2024 0.57 (L) 0.90 - 1.70 ng/dL Final     Secondary adrenal insufficiency/Hypogonadism: Patient is receiving hydrocortisone 15 mg every morning/7.5 mg every afternoon and testosterone cypionate 60 mg every 7 days. Follows with endocrinology. Denies any known issues.      Pain:  Patient has acetaminophen available for use, but is not currently needing to use.  No side effects reported when needed.     Supplements   Supplements: Patient is receive vitamin C 1,000 mg daily, vitamin D3 2,000 units daily, vitamin B12 daily, a daily multivitamin. Denies any current issues.        Today's Vitals: There were no vitals taken for this visit.    BP Readings from Last 3 Encounters:   12/24/24 117/76   12/18/24 (!) 142/68   12/18/24 110/76     Wt Readings from Last 5 Encounters:   12/13/24 180 lb 12.4 oz (82 kg)   12/01/24 150 lb (68 kg)   11/03/24 150 lb 1.6 oz (68.1 kg)   10/21/24 150 lb 2.1 oz  (68.1 kg)   09/18/24 150 lb 2.1 oz (68.1 kg)     ----------------  Post Discharge Medication Reconciliation Status: discharge medications reconciled, continue medications without change.    I spent 20 minutes with this patient today.     A summary of these recommendations was declined by the patient.    Naveed Ortiz, PharmD, [facility]  Medication Therapy Management Pharmacist  Voicemail: [phone]    Telemedicine Visit Details  The patient's medications can be safely assessed via a telemedicine encounter.  Type of service:  Telephone visit  Originating Location (pt. Location): Home    Distant Location (provider location):  On-site  Start Time:  1:00 PM  End Time:  1:20 PM     Medication Therapy Recommendations  No medication therapy recommendations to display

## 2025-01-02 ENCOUNTER — MEDICAL CORRESPONDENCE (OUTPATIENT)
Dept: HEALTH INFORMATION MANAGEMENT | Facility: CLINIC | Age: 65
End: 2025-01-02
Payer: MEDICARE

## 2025-01-02 DIAGNOSIS — Z53.9 DIAGNOSIS NOT YET DEFINED: Primary | ICD-10-CM

## 2025-01-10 ENCOUNTER — MEDICAL CORRESPONDENCE (OUTPATIENT)
Dept: HEALTH INFORMATION MANAGEMENT | Facility: CLINIC | Age: 65
End: 2025-01-10
Payer: MEDICARE

## 2025-01-10 DIAGNOSIS — Z53.9 DIAGNOSIS NOT YET DEFINED: Primary | ICD-10-CM

## 2025-01-10 PROCEDURE — G0179 MD RECERTIFICATION HHA PT: HCPCS | Performed by: INTERNAL MEDICINE

## 2025-01-14 ENCOUNTER — TELEPHONE (OUTPATIENT)
Dept: FAMILY MEDICINE | Facility: CLINIC | Age: 65
End: 2025-01-14
Payer: MEDICARE

## 2025-01-14 NOTE — TELEPHONE ENCOUNTER
"Missing Chart notes and questionnaire \"Face to Face mobility guide\" 5 question that need to be answered. Office visit note in the past 120 days.    Phone was disconnected prior to the finish of conversation did try to call medical service back left a message.      Fax: [phone]    Jossie Bartlett RN    "

## 2025-01-23 ENCOUNTER — HOSPITAL ENCOUNTER (INPATIENT)
Facility: CLINIC | Age: 65
DRG: 871 | End: 2025-01-23
Attending: EMERGENCY MEDICINE | Admitting: HOSPITALIST
Payer: MEDICARE

## 2025-01-23 ENCOUNTER — APPOINTMENT (OUTPATIENT)
Dept: CT IMAGING | Facility: CLINIC | Age: 65
DRG: 871 | End: 2025-01-23
Attending: EMERGENCY MEDICINE
Payer: MEDICARE

## 2025-01-23 VITALS
OXYGEN SATURATION: 99 % | RESPIRATION RATE: 16 BRPM | TEMPERATURE: 97.4 F | DIASTOLIC BLOOD PRESSURE: 66 MMHG | HEART RATE: 89 BPM | SYSTOLIC BLOOD PRESSURE: 116 MMHG

## 2025-01-23 DIAGNOSIS — R65.20 SEPSIS WITH ACUTE HYPOXIC RESPIRATORY FAILURE WITHOUT SEPTIC SHOCK, DUE TO UNSPECIFIED ORGANISM (H): ICD-10-CM

## 2025-01-23 DIAGNOSIS — J39.8 INCREASED TRACHEAL SECRETIONS: ICD-10-CM

## 2025-01-23 DIAGNOSIS — A41.9 SEPSIS WITH ACUTE HYPOXIC RESPIRATORY FAILURE WITHOUT SEPTIC SHOCK, DUE TO UNSPECIFIED ORGANISM (H): ICD-10-CM

## 2025-01-23 DIAGNOSIS — J69.0 ASPIRATION PNEUMONIA OF RIGHT LUNG, UNSPECIFIED ASPIRATION PNEUMONIA TYPE, UNSPECIFIED PART OF LUNG (H): ICD-10-CM

## 2025-01-23 DIAGNOSIS — J69.0 RECURRENT ASPIRATION PNEUMONIA (H): Primary | ICD-10-CM

## 2025-01-23 DIAGNOSIS — R50.9 FEVER IN ADULT: ICD-10-CM

## 2025-01-23 DIAGNOSIS — J96.01 ACUTE RESPIRATORY FAILURE WITH HYPOXIA (H): ICD-10-CM

## 2025-01-23 DIAGNOSIS — J96.01 SEPSIS WITH ACUTE HYPOXIC RESPIRATORY FAILURE WITHOUT SEPTIC SHOCK, DUE TO UNSPECIFIED ORGANISM (H): ICD-10-CM

## 2025-01-23 DIAGNOSIS — J69.0 ASPIRATION PNEUMONIA OF BOTH LUNGS, UNSPECIFIED ASPIRATION PNEUMONIA TYPE, UNSPECIFIED PART OF LUNG (H): ICD-10-CM

## 2025-01-23 LAB
ALBUMIN SERPL BCG-MCNC: 3.5 G/DL (ref 3.5–5.2)
ALBUMIN UR-MCNC: NEGATIVE MG/DL
ALP SERPL-CCNC: 67 U/L (ref 40–150)
ALT SERPL W P-5'-P-CCNC: 28 U/L (ref 0–70)
ANION GAP SERPL CALCULATED.3IONS-SCNC: 11 MMOL/L (ref 7–15)
ANION GAP SERPL CALCULATED.3IONS-SCNC: 11 MMOL/L (ref 7–15)
APPEARANCE UR: CLEAR
AST SERPL W P-5'-P-CCNC: 40 U/L (ref 0–45)
ATRIAL RATE - MUSE: 115 BPM
BACTERIA #/AREA URNS HPF: ABNORMAL /HPF
BASE EXCESS BLDV CALC-SCNC: -8 MMOL/L (ref -3–3)
BASE EXCESS BLDV CALC-SCNC: 7 MMOL/L (ref -3–3)
BASOPHILS # BLD AUTO: 0.1 10E3/UL (ref 0–0.2)
BASOPHILS NFR BLD AUTO: 1 %
BILIRUB SERPL-MCNC: 0.3 MG/DL
BILIRUB UR QL STRIP: NEGATIVE
BUN SERPL-MCNC: 26.3 MG/DL (ref 8–23)
BUN SERPL-MCNC: 29.7 MG/DL (ref 8–23)
CALCIUM SERPL-MCNC: 7.4 MG/DL (ref 8.8–10.4)
CALCIUM SERPL-MCNC: 8.5 MG/DL (ref 8.8–10.4)
CHLORIDE SERPL-SCNC: 106 MMOL/L (ref 98–107)
CHLORIDE SERPL-SCNC: 107 MMOL/L (ref 98–107)
COLOR UR AUTO: ABNORMAL
CREAT SERPL-MCNC: 1.18 MG/DL (ref 0.67–1.17)
CREAT SERPL-MCNC: 1.27 MG/DL (ref 0.67–1.17)
DIASTOLIC BLOOD PRESSURE - MUSE: NORMAL MMHG
EGFRCR SERPLBLD CKD-EPI 2021: 64 ML/MIN/1.73M2
EGFRCR SERPLBLD CKD-EPI 2021: 70 ML/MIN/1.73M2
EOSINOPHIL # BLD AUTO: 0.8 10E3/UL (ref 0–0.7)
EOSINOPHIL NFR BLD AUTO: 8 %
ERYTHROCYTE [DISTWIDTH] IN BLOOD BY AUTOMATED COUNT: 15.8 % (ref 10–15)
FLUAV RNA SPEC QL NAA+PROBE: NEGATIVE
FLUBV RNA RESP QL NAA+PROBE: NEGATIVE
GLUCOSE SERPL-MCNC: 133 MG/DL (ref 70–99)
GLUCOSE SERPL-MCNC: 143 MG/DL (ref 70–99)
GLUCOSE UR STRIP-MCNC: NEGATIVE MG/DL
HCO3 BLDV-SCNC: 16 MMOL/L (ref 21–28)
HCO3 BLDV-SCNC: 33 MMOL/L (ref 21–28)
HCO3 SERPL-SCNC: 26 MMOL/L (ref 22–29)
HCO3 SERPL-SCNC: 28 MMOL/L (ref 22–29)
HCT VFR BLD AUTO: 42.1 % (ref 40–53)
HGB BLD-MCNC: 12.8 G/DL (ref 13.3–17.7)
HGB UR QL STRIP: ABNORMAL
IMM GRANULOCYTES # BLD: 0 10E3/UL
IMM GRANULOCYTES NFR BLD: 0 %
INTERPRETATION ECG - MUSE: NORMAL
KETONES UR STRIP-MCNC: NEGATIVE MG/DL
LACTATE BLD-SCNC: 1.6 MMOL/L
LACTATE BLD-SCNC: 3.2 MMOL/L
LACTATE SERPL-SCNC: 2.1 MMOL/L (ref 0.7–2)
LACTATE SERPL-SCNC: 2.3 MMOL/L (ref 0.7–2)
LEUKOCYTE ESTERASE UR QL STRIP: NEGATIVE
LYMPHOCYTES # BLD AUTO: 3 10E3/UL (ref 0.8–5.3)
LYMPHOCYTES NFR BLD AUTO: 33 %
MCH RBC QN AUTO: 27.4 PG (ref 26.5–33)
MCHC RBC AUTO-ENTMCNC: 30.4 G/DL (ref 31.5–36.5)
MCV RBC AUTO: 90 FL (ref 78–100)
MONOCYTES # BLD AUTO: 0.9 10E3/UL (ref 0–1.3)
MONOCYTES NFR BLD AUTO: 10 %
NEUTROPHILS # BLD AUTO: 4.2 10E3/UL (ref 1.6–8.3)
NEUTROPHILS NFR BLD AUTO: 47 %
NITRATE UR QL: NEGATIVE
NRBC # BLD AUTO: 0 10E3/UL
NRBC BLD AUTO-RTO: 0 /100
P AXIS - MUSE: 62 DEGREES
PCO2 BLDV: 24 MM HG (ref 40–50)
PCO2 BLDV: 49 MM HG (ref 40–50)
PH BLDV: 7.43 [PH] (ref 7.32–7.43)
PH BLDV: 7.43 [PH] (ref 7.32–7.43)
PH UR STRIP: 8 [PH] (ref 5–7)
PLATELET # BLD AUTO: 242 10E3/UL (ref 150–450)
PO2 BLDV: 38 MM HG (ref 25–47)
PO2 BLDV: 47 MM HG (ref 25–47)
POTASSIUM SERPL-SCNC: 3.4 MMOL/L (ref 3.4–5.3)
POTASSIUM SERPL-SCNC: 5.4 MMOL/L (ref 3.4–5.3)
PR INTERVAL - MUSE: 164 MS
PROCALCITONIN SERPL IA-MCNC: 0.08 NG/ML
PROT SERPL-MCNC: 7.3 G/DL (ref 6.4–8.3)
QRS DURATION - MUSE: 86 MS
QT - MUSE: 336 MS
QTC - MUSE: 464 MS
R AXIS - MUSE: -48 DEGREES
RBC # BLD AUTO: 4.67 10E6/UL (ref 4.4–5.9)
RBC URINE: <1 /HPF
RSV RNA SPEC NAA+PROBE: NEGATIVE
SAO2 % BLDV: 73 % (ref 70–75)
SAO2 % BLDV: 85 % (ref 70–75)
SARS-COV-2 RNA RESP QL NAA+PROBE: NEGATIVE
SODIUM SERPL-SCNC: 143 MMOL/L (ref 135–145)
SODIUM SERPL-SCNC: 146 MMOL/L (ref 135–145)
SP GR UR STRIP: 1.01 (ref 1–1.03)
SYSTOLIC BLOOD PRESSURE - MUSE: NORMAL MMHG
T AXIS - MUSE: 66 DEGREES
TROPONIN T SERPL HS-MCNC: 46 NG/L
TROPONIN T SERPL HS-MCNC: 50 NG/L
UROBILINOGEN UR STRIP-MCNC: NORMAL MG/DL
VENTRICULAR RATE- MUSE: 115 BPM
WBC # BLD AUTO: 9 10E3/UL (ref 4–11)
WBC URINE: <1 /HPF

## 2025-01-23 PROCEDURE — 87086 URINE CULTURE/COLONY COUNT: CPT | Performed by: EMERGENCY MEDICINE

## 2025-01-23 PROCEDURE — 99233 SBSQ HOSP IP/OBS HIGH 50: CPT | Performed by: STUDENT IN AN ORGANIZED HEALTH CARE EDUCATION/TRAINING PROGRAM

## 2025-01-23 PROCEDURE — 258N000002 HC RX IP 258 OP 250: Performed by: HOSPITALIST

## 2025-01-23 PROCEDURE — 120N000001 HC R&B MED SURG/OB

## 2025-01-23 PROCEDURE — 250N000011 HC RX IP 250 OP 636: Performed by: HOSPITALIST

## 2025-01-23 PROCEDURE — 36415 COLL VENOUS BLD VENIPUNCTURE: CPT

## 2025-01-23 PROCEDURE — 250N000009 HC RX 250: Performed by: HOSPITALIST

## 2025-01-23 PROCEDURE — 83605 ASSAY OF LACTIC ACID: CPT | Performed by: HOSPITALIST

## 2025-01-23 PROCEDURE — 51798 US URINE CAPACITY MEASURE: CPT

## 2025-01-23 PROCEDURE — 80053 COMPREHEN METABOLIC PANEL: CPT | Performed by: EMERGENCY MEDICINE

## 2025-01-23 PROCEDURE — 250N000011 HC RX IP 250 OP 636: Performed by: EMERGENCY MEDICINE

## 2025-01-23 PROCEDURE — 81001 URINALYSIS AUTO W/SCOPE: CPT | Performed by: EMERGENCY MEDICINE

## 2025-01-23 PROCEDURE — 83605 ASSAY OF LACTIC ACID: CPT

## 2025-01-23 PROCEDURE — 84145 PROCALCITONIN (PCT): CPT

## 2025-01-23 PROCEDURE — 87637 SARSCOV2&INF A&B&RSV AMP PRB: CPT | Performed by: EMERGENCY MEDICINE

## 2025-01-23 PROCEDURE — 82803 BLOOD GASES ANY COMBINATION: CPT

## 2025-01-23 PROCEDURE — 99291 CRITICAL CARE FIRST HOUR: CPT | Mod: 25

## 2025-01-23 PROCEDURE — 87040 BLOOD CULTURE FOR BACTERIA: CPT | Performed by: EMERGENCY MEDICINE

## 2025-01-23 PROCEDURE — 36415 COLL VENOUS BLD VENIPUNCTURE: CPT | Performed by: HOSPITALIST

## 2025-01-23 PROCEDURE — 36415 COLL VENOUS BLD VENIPUNCTURE: CPT | Performed by: EMERGENCY MEDICINE

## 2025-01-23 PROCEDURE — 84145 PROCALCITONIN (PCT): CPT | Performed by: EMERGENCY MEDICINE

## 2025-01-23 PROCEDURE — 99223 1ST HOSP IP/OBS HIGH 75: CPT | Mod: AI | Performed by: HOSPITALIST

## 2025-01-23 PROCEDURE — 250N000013 HC RX MED GY IP 250 OP 250 PS 637: Performed by: EMERGENCY MEDICINE

## 2025-01-23 PROCEDURE — 84484 ASSAY OF TROPONIN QUANT: CPT | Performed by: EMERGENCY MEDICINE

## 2025-01-23 PROCEDURE — 258N000003 HC RX IP 258 OP 636: Performed by: EMERGENCY MEDICINE

## 2025-01-23 PROCEDURE — 250N000013 HC RX MED GY IP 250 OP 250 PS 637: Performed by: HOSPITALIST

## 2025-01-23 PROCEDURE — 85025 COMPLETE CBC W/AUTO DIFF WBC: CPT | Performed by: EMERGENCY MEDICINE

## 2025-01-23 PROCEDURE — 80051 ELECTROLYTE PANEL: CPT | Performed by: HOSPITALIST

## 2025-01-23 PROCEDURE — 96360 HYDRATION IV INFUSION INIT: CPT | Mod: 59

## 2025-01-23 PROCEDURE — 258N000003 HC RX IP 258 OP 636: Performed by: HOSPITALIST

## 2025-01-23 PROCEDURE — 93005 ELECTROCARDIOGRAM TRACING: CPT

## 2025-01-23 PROCEDURE — 99292 CRITICAL CARE ADDL 30 MIN: CPT

## 2025-01-23 PROCEDURE — 250N000009 HC RX 250: Performed by: EMERGENCY MEDICINE

## 2025-01-23 PROCEDURE — 71260 CT THORAX DX C+: CPT

## 2025-01-23 RX ORDER — ACETAMINOPHEN 325 MG/10.15ML
650 LIQUID ORAL EVERY 6 HOURS PRN
Status: DISCONTINUED | OUTPATIENT
Start: 2025-01-23 | End: 2025-01-28 | Stop reason: HOSPADM

## 2025-01-23 RX ORDER — AMOXICILLIN 250 MG
2 CAPSULE ORAL 2 TIMES DAILY PRN
Status: DISCONTINUED | OUTPATIENT
Start: 2025-01-23 | End: 2025-01-28 | Stop reason: HOSPADM

## 2025-01-23 RX ORDER — PIPERACILLIN SODIUM, TAZOBACTAM SODIUM 4; .5 G/20ML; G/20ML
4.5 INJECTION, POWDER, LYOPHILIZED, FOR SOLUTION INTRAVENOUS ONCE
Status: COMPLETED | OUTPATIENT
Start: 2025-01-23 | End: 2025-01-23

## 2025-01-23 RX ORDER — ACETYLCYSTEINE 200 MG/ML
2 SOLUTION ORAL; RESPIRATORY (INHALATION) 4 TIMES DAILY
Status: DISCONTINUED | OUTPATIENT
Start: 2025-01-23 | End: 2025-01-28 | Stop reason: HOSPADM

## 2025-01-23 RX ORDER — IOPAMIDOL 755 MG/ML
83 INJECTION, SOLUTION INTRAVASCULAR ONCE
Status: COMPLETED | OUTPATIENT
Start: 2025-01-23 | End: 2025-01-23

## 2025-01-23 RX ORDER — SODIUM CHLORIDE 450 MG/100ML
INJECTION, SOLUTION INTRAVENOUS CONTINUOUS
Status: DISCONTINUED | OUTPATIENT
Start: 2025-01-23 | End: 2025-01-25

## 2025-01-23 RX ORDER — ALBUTEROL SULFATE 5 MG/ML
2.5 SOLUTION RESPIRATORY (INHALATION) EVERY 6 HOURS PRN
Status: DISCONTINUED | OUTPATIENT
Start: 2025-01-23 | End: 2025-01-24

## 2025-01-23 RX ORDER — ACETAMINOPHEN 650 MG/1
650 SUPPOSITORY RECTAL EVERY 4 HOURS PRN
Status: DISCONTINUED | OUTPATIENT
Start: 2025-01-23 | End: 2025-01-23

## 2025-01-23 RX ORDER — PIPERACILLIN SODIUM, TAZOBACTAM SODIUM 4; .5 G/20ML; G/20ML
4.5 INJECTION, POWDER, LYOPHILIZED, FOR SOLUTION INTRAVENOUS EVERY 6 HOURS
Status: DISCONTINUED | OUTPATIENT
Start: 2025-01-23 | End: 2025-01-26

## 2025-01-23 RX ORDER — HYDROCORTISONE SODIUM SUCCINATE 100 MG/2ML
50 INJECTION INTRAMUSCULAR; INTRAVENOUS EVERY 8 HOURS
Status: DISCONTINUED | OUTPATIENT
Start: 2025-01-23 | End: 2025-01-25

## 2025-01-23 RX ORDER — LIDOCAINE 40 MG/G
CREAM TOPICAL
Status: DISCONTINUED | OUTPATIENT
Start: 2025-01-23 | End: 2025-01-24

## 2025-01-23 RX ORDER — ACETAMINOPHEN 650 MG/1
650 SUPPOSITORY RECTAL ONCE
Status: DISCONTINUED | OUTPATIENT
Start: 2025-01-23 | End: 2025-01-23 | Stop reason: ALTCHOICE

## 2025-01-23 RX ORDER — LIDOCAINE 40 MG/G
CREAM TOPICAL
Status: DISCONTINUED | OUTPATIENT
Start: 2025-01-23 | End: 2025-01-28 | Stop reason: HOSPADM

## 2025-01-23 RX ORDER — THERA TABS 400 MCG
1 TAB ORAL DAILY
Status: DISCONTINUED | OUTPATIENT
Start: 2025-01-24 | End: 2025-01-28 | Stop reason: HOSPADM

## 2025-01-23 RX ORDER — CARBAMAZEPINE 100 MG/5ML
100 SUSPENSION ORAL DAILY
Status: DISCONTINUED | OUTPATIENT
Start: 2025-01-23 | End: 2025-01-28 | Stop reason: HOSPADM

## 2025-01-23 RX ORDER — LANOLIN ALCOHOL/MO/W.PET/CERES
1000 CREAM (GRAM) TOPICAL EVERY MORNING
Status: DISCONTINUED | OUTPATIENT
Start: 2025-01-24 | End: 2025-01-28 | Stop reason: HOSPADM

## 2025-01-23 RX ORDER — AMOXICILLIN 250 MG
1 CAPSULE ORAL 2 TIMES DAILY PRN
Status: DISCONTINUED | OUTPATIENT
Start: 2025-01-23 | End: 2025-01-28 | Stop reason: HOSPADM

## 2025-01-23 RX ORDER — ACETAMINOPHEN 325 MG/1
650 TABLET ORAL ONCE
Status: COMPLETED | OUTPATIENT
Start: 2025-01-23 | End: 2025-01-23

## 2025-01-23 RX ORDER — LEVOTHYROXINE SODIUM 88 UG/1
88 TABLET ORAL DAILY
Status: DISCONTINUED | OUTPATIENT
Start: 2025-01-24 | End: 2025-01-28 | Stop reason: HOSPADM

## 2025-01-23 RX ORDER — CALCIUM CARBONATE 500 MG/1
1000 TABLET, CHEWABLE ORAL 4 TIMES DAILY PRN
Status: DISCONTINUED | OUTPATIENT
Start: 2025-01-23 | End: 2025-01-28 | Stop reason: HOSPADM

## 2025-01-23 RX ORDER — CARBAMAZEPINE 100 MG/5ML
150 SUSPENSION ORAL ONCE
Status: COMPLETED | OUTPATIENT
Start: 2025-01-23 | End: 2025-01-23

## 2025-01-23 RX ORDER — ACETAMINOPHEN 325 MG/1
650 TABLET ORAL EVERY 4 HOURS PRN
Status: DISCONTINUED | OUTPATIENT
Start: 2025-01-23 | End: 2025-01-23

## 2025-01-23 RX ORDER — POLYETHYLENE GLYCOL 3350 17 G/17G
17 POWDER, FOR SOLUTION ORAL DAILY
Status: DISCONTINUED | OUTPATIENT
Start: 2025-01-24 | End: 2025-01-28 | Stop reason: HOSPADM

## 2025-01-23 RX ORDER — METOCLOPRAMIDE HYDROCHLORIDE 5 MG/5ML
10 SOLUTION ORAL
Status: DISCONTINUED | OUTPATIENT
Start: 2025-01-23 | End: 2025-01-28 | Stop reason: HOSPADM

## 2025-01-23 RX ORDER — GLYCOPYRROLATE 1 MG/1
2 TABLET ORAL 2 TIMES DAILY
Status: DISCONTINUED | OUTPATIENT
Start: 2025-01-23 | End: 2025-01-28 | Stop reason: HOSPADM

## 2025-01-23 RX ORDER — CARBAMAZEPINE 100 MG/5ML
150 SUSPENSION ORAL 3 TIMES DAILY
Status: DISCONTINUED | OUTPATIENT
Start: 2025-01-24 | End: 2025-01-28 | Stop reason: HOSPADM

## 2025-01-23 RX ORDER — SENNOSIDES 8.6 MG
1 TABLET ORAL 2 TIMES DAILY PRN
Status: DISCONTINUED | OUTPATIENT
Start: 2025-01-23 | End: 2025-01-28 | Stop reason: HOSPADM

## 2025-01-23 RX ADMIN — IOPAMIDOL 83 ML: 755 INJECTION, SOLUTION INTRAVENOUS at 10:46

## 2025-01-23 RX ADMIN — CARBAMAZEPINE 100 MG: 100 SUSPENSION ORAL at 18:40

## 2025-01-23 RX ADMIN — METOCLOPRAMIDE HYDROCHLORIDE 10 MG: 5 SOLUTION ORAL at 21:56

## 2025-01-23 RX ADMIN — Medication 40 MG: at 21:52

## 2025-01-23 RX ADMIN — GLYCOPYRROLATE 2 MG: 1 TABLET ORAL at 21:52

## 2025-01-23 RX ADMIN — CARBAMAZEPINE 150 MG: 100 SUSPENSION ORAL at 13:38

## 2025-01-23 RX ADMIN — SODIUM CHLORIDE, POTASSIUM CHLORIDE, SODIUM LACTATE AND CALCIUM CHLORIDE 1000 ML: 600; 310; 30; 20 INJECTION, SOLUTION INTRAVENOUS at 11:33

## 2025-01-23 RX ADMIN — ACETAMINOPHEN 650 MG: 325 TABLET, FILM COATED ORAL at 11:29

## 2025-01-23 RX ADMIN — PIPERACILLIN AND TAZOBACTAM 4.5 G: 4; .5 INJECTION, POWDER, FOR SOLUTION INTRAVENOUS at 18:43

## 2025-01-23 RX ADMIN — ACETYLCYSTEINE 2 ML: 200 SOLUTION ORAL; RESPIRATORY (INHALATION) at 18:38

## 2025-01-23 RX ADMIN — SODIUM CHLORIDE 1000 ML: 9 INJECTION, SOLUTION INTRAVENOUS at 09:44

## 2025-01-23 RX ADMIN — ALBUTEROL SULFATE 2.5 MG: 2.5 SOLUTION RESPIRATORY (INHALATION) at 18:35

## 2025-01-23 RX ADMIN — BRIVARACETAM 100 MG: 10 SOLUTION ORAL at 21:53

## 2025-01-23 RX ADMIN — SODIUM CHLORIDE 65 ML: 9 INJECTION, SOLUTION INTRAVENOUS at 10:46

## 2025-01-23 RX ADMIN — VANCOMYCIN HYDROCHLORIDE 1750 MG: 10 INJECTION, POWDER, LYOPHILIZED, FOR SOLUTION INTRAVENOUS at 12:16

## 2025-01-23 RX ADMIN — PIPERACILLIN AND TAZOBACTAM 4.5 G: 4; .5 INJECTION, POWDER, FOR SOLUTION INTRAVENOUS at 12:04

## 2025-01-23 RX ADMIN — HYDROCORTISONE SODIUM SUCCINATE 50 MG: 100 INJECTION, POWDER, FOR SOLUTION INTRAMUSCULAR; INTRAVENOUS at 13:39

## 2025-01-23 RX ADMIN — SODIUM CHLORIDE: 4.5 INJECTION, SOLUTION INTRAVENOUS at 16:43

## 2025-01-23 RX ADMIN — SODIUM CHLORIDE 500 ML: 9 INJECTION, SOLUTION INTRAVENOUS at 16:12

## 2025-01-23 ASSESSMENT — ACTIVITIES OF DAILY LIVING (ADL)
ADLS_ACUITY_SCORE: 61
ADLS_ACUITY_SCORE: 67
ADLS_ACUITY_SCORE: 67
ADLS_ACUITY_SCORE: 61
ADLS_ACUITY_SCORE: 68
ADLS_ACUITY_SCORE: 61
ADLS_ACUITY_SCORE: 67
ADLS_ACUITY_SCORE: 61
ADLS_ACUITY_SCORE: 61

## 2025-01-23 NOTE — ED NOTES
[facility]  ED Nurse Handoff Report    ED Chief complaint: Fever      ED Diagnosis:   Final diagnoses:   Recurrent aspiration pneumonia (H)   Acute respiratory failure with hypoxia (H)   Fever in adult       Code Status: Full Code    Allergies:   Allergies   Allergen Reactions    Valproic Acid Other (See Comments)     Toxicity w/ bone marrow suspension, elevated ammonia levels   Poisons system    Scopolamine Hives     Hives with the patch - oral no problem    Vancomycin Other (See Comments)     Vancomycin flushing syndrome - pt tolerated dose at half rate.    Phenytoin Sodium      Tremor       Patient Story: BIBA from home, cough and weakness  Focused Assessment:  febrile with congested cough  CT Chest/Abdomen/Pelvis w Contrast   Preliminary Result   IMPRESSION:   1.  Interval moderate improvement in posterior right lower lobe infiltrates.   2.  Stable mild infiltrate/atelectasis in the left lower lobe.   3.  Tiny amount of free air in the right upper quadrant without evidence of bowel wall thickening or inflammation to suggest perforation. This may be related to previous gastrojejunostomy tube placement, but this is more air than would be expected    greater than one month after placement.   4.  Stable cystic lesion in the tail of the pancreas. No apparent septations or nodularity. MRI with contrast would be recommended if one has not been performed.   5.  Stable low-attenuation lesion in the left hepatic lobe shows density greater than simple fluid. MRI would be useful for this as well.   6.  Subtle possible wall thickening along the right posterior bladder. Follow-up CT with delayed images through the bladder is recommended.        Labs Ordered and Resulted from Time of ED Arrival to Time of ED Departure   COMPREHENSIVE METABOLIC PANEL - Abnormal       Result Value    Sodium 146 (*)     Potassium 3.4      Carbon Dioxide (CO2) 28      Anion Gap 11      Urea Nitrogen 29.7 (*)     Creatinine 1.27  (*)     GFR Estimate 64      Calcium 8.5 (*)     Chloride 107      Glucose 143 (*)     Alkaline Phosphatase 67      AST 40      ALT 28      Protein Total 7.3      Albumin 3.5      Bilirubin Total 0.3     TROPONIN T, HIGH SENSITIVITY - Abnormal    Troponin T, High Sensitivity 46 (*)    CBC WITH PLATELETS AND DIFFERENTIAL - Abnormal    WBC Count 9.0      RBC Count 4.67      Hemoglobin 12.8 (*)     Hematocrit 42.1      MCV 90      MCH 27.4      MCHC 30.4 (*)     RDW 15.8 (*)     Platelet Count 242      % Neutrophils 47      % Lymphocytes 33      % Monocytes 10      % Eosinophils 8      % Basophils 1      % Immature Granulocytes 0      NRBCs per 100 WBC 0      Absolute Neutrophils 4.2      Absolute Lymphocytes 3.0      Absolute Monocytes 0.9      Absolute Eosinophils 0.8 (*)     Absolute Basophils 0.1      Absolute Immature Granulocytes 0.0      Absolute NRBCs 0.0     ISTAT GASES LACTATE VENOUS POCT - Abnormal    Lactic Acid POCT 1.6      Bicarbonate Venous POCT 33 (*)     O2 Sat, Venous POCT 73      pCO2 Venous POCT 49      pH Venous POCT 7.43      pO2 Venous POCT 38      Base Excess/Deficit (+/-) POCT 7.0 (*)    TROPONIN T, HIGH SENSITIVITY - Abnormal    Troponin T, High Sensitivity 50 (*)    PROCALCITONIN - Normal    Procalcitonin 0.08     INFLUENZA A/B, RSV AND SARS-COV2 PCR - Normal    Influenza A PCR Negative      Influenza B PCR Negative      RSV PCR Negative      SARS CoV2 PCR Negative     ROUTINE UA WITH MICROSCOPIC   BLOOD CULTURE   BLOOD CULTURE   URINE CULTURE         Treatments and/or interventions provided: oral care, suction, fluids and abx  Patient's response to treatments and/or interventions: pt became much more alert after 1.5L of fluid; he mouth was dry and had thick mucus secretions; the more alert [place] got, more he resisted oral cares; external cath in place; recent bladder scan 176ml, no urine as of yet    To be done/followed up on inpatient unit:  UA, cont, care    Does this patient have any  cognitive concerns?:  baseline     Activity level - Baseline/Home:  Total Care  Activity Level - Current:   Total Care    Patient's Preferred language: English   Needed?: No    Isolation: Contact   Infection: MRSA  VRE  Patient tested for COVID 19 prior to admission: YES  Bariatric?: No    Vital Signs:   Vitals:    01/23/25 1114 01/23/25 1124 01/23/25 1146 01/23/25 1201   BP: 99/56  101/61    Pulse: 97 97 92 81   Resp: 24 21 24 14   Temp:       TempSrc:       SpO2:  100% 99% 100%       Cardiac Rhythm:     Was the PSS-3 completed:   Yes  What interventions are required if any?               Family Comments: mom has been updated   OBS brochure/video discussed/provided to patient/family: No              Name of person given brochure if not patient:               Relationship to patient:     For the majority of the shift this patient's behavior was Green.   Behavioral interventions performed were .    ED NURSE PHONE NUMBER: *[id number]
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
## 2025-01-23 NOTE — ED PROVIDER NOTES
Emergency Department Note      History of Present Illness     Chief Complaint   Fever    HPI   History is limited because the patient is nonverbal.   Keyon Farias is a 64 year old male presenting to the ED for evaluation of fever and hypoxia. EMS reports that the patient had an axillary temp of 99.5 F and became hypoxic this morning. He is not on oxygen at baseline. It is unclear if he has received Tylenol or ibuprofen today. The patient lives at home with his mother, and they have home health care. No other medical concerns at this time.     Independent Historian   EMS as detailed above.    Review of External Notes   Reviewed patient's discharge summary from 12/13/2024.  Patient was admitted for bilateral pneumonia with severe sepsis suspected aspiration also possible HCAP.  Patient did have Staph aureus, Pseudomonas aeruginosa, and Corynebacterium striatum from sputum.    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  DVT  GERD  Panhypopituitarism  Pneumonia  Seizures  Sepsis  Septic shock  Spastic hemiplegia  Thyroid disease  Tracheostomy care  Unspecific cerebral artery occlusion with cerebral infarction  UTI  Ventricular fibrillation  Ventricular tachyarrhythmia  Thrombocytopenia  Leukocytosis  Esophagitis  Dysphagia  Pancreatitis  Cardiac arrest  Hyperlipidemia  Bladder calculus  Hemiparesis  Panhypopituitarism  Atelectasis  Hyponatremia  Appendicitis    Medications   Albuterol  Cortef  Protonix  Briviact  Tegretol  Levothyroxine  Reglan  Depotestosterone    Surgical History   Endoscopic ultrasound of upper GI  Esophagogastroduodenoscopy x5  Head and neck surgery   Gastro jejunostomy tube change x25  PICC exchange left  Appendectomy  Insert G tube  Right hand repair  Tracheostomy  Vascular surgery     Physical Exam     Patient Vitals for the past 24 hrs:   BP Temp Temp src Pulse Resp SpO2   01/23/25 1316 -- -- -- -- 17 --   01/23/25 1315 97/55 -- -- 86 -- 98 %   01/23/25  1201 -- -- -- 81 14 100 %   01/23/25 1146 101/61 -- -- 92 24 99 %   01/23/25 1124 -- -- -- 97 21 100 %   01/23/25 1114 99/56 -- -- 97 24 --   01/23/25 1039 -- -- -- 105 19 98 %   01/23/25 1035 -- -- -- 104 18 98 %   01/23/25 1034 -- -- -- 105 13 98 %   01/23/25 1033 92/57 -- -- 104 -- --   01/23/25 1015 100/64 -- -- 105 22 96 %   01/23/25 1001 -- -- -- -- 21 --   01/23/25 1000 99/63 -- -- (!) 108 -- 97 %   01/23/25 0945 (!) 87/73 -- -- (!) 115 -- 96 %   01/23/25 0936 -- 101  F (38.3  C) Rectal -- -- --   01/23/25 0930 104/77 -- -- (!) 122 -- (!) 91 %   01/23/25 0915 96/71 -- -- (!) 121 -- 94 %   01/23/25 0914 -- (P) 99.5  F (37.5  C) (P) Axillary -- -- --   01/23/25 0900 112/71 -- -- (!) 124 -- (!) 91 %   01/23/25 0845 99/62 -- -- (!) 119 -- 93 %   01/23/25 0843 100/65 -- -- (!) 120 -- --     Physical Exam  General: Alert, appears chronically ill, paraplegic, non-verbal.     HEENT:  Head:  Atraumatic  Ears:  External ears are normal  Mouth/Throat:  Oropharynx is without erythema or exudate and mucous membranes are dry.   Eyes:   Conjunctivae normal and EOM are normal. No scleral icterus.  CV:  Tachycardic rate, regular rhythm, normal heart sounds and radial pulses are 2+ and symmetric.  No murmur.  Resp:  Breath sounds with crackles to bilateral bases worse in left lower.  On 2LPM O2 by NC.      Mild laboring, no retractions or accessory muscle use  GI:  Abdomen is soft, no distension, no tenderness. No rebound or guarding.  No CVA tenderness bilaterally  MS:  No edema.    Chronic contractures to lower extremities.      Back atraumatic.    No midline cervical, thoracic, or lumbar tenderness  Skin:  Warm and dry.  Feeding tube present to left abdominal wall.  Neuro:   Alert. Baseline neurologic functioning.  Non-verbal.     Diagnostics     Lab Results   Labs Ordered and Resulted from Time of ED Arrival to Time of ED Departure   COMPREHENSIVE METABOLIC PANEL - Abnormal       Result Value    Sodium 146 (*)      Potassium 3.4      Carbon Dioxide (CO2) 28      Anion Gap 11      Urea Nitrogen 29.7 (*)     Creatinine 1.27 (*)     GFR Estimate 64      Calcium 8.5 (*)     Chloride 107      Glucose 143 (*)     Alkaline Phosphatase 67      AST 40      ALT 28      Protein Total 7.3      Albumin 3.5      Bilirubin Total 0.3     TROPONIN T, HIGH SENSITIVITY - Abnormal    Troponin T, High Sensitivity 46 (*)    CBC WITH PLATELETS AND DIFFERENTIAL - Abnormal    WBC Count 9.0      RBC Count 4.67      Hemoglobin 12.8 (*)     Hematocrit 42.1      MCV 90      MCH 27.4      MCHC 30.4 (*)     RDW 15.8 (*)     Platelet Count 242      % Neutrophils 47      % Lymphocytes 33      % Monocytes 10      % Eosinophils 8      % Basophils 1      % Immature Granulocytes 0      NRBCs per 100 WBC 0      Absolute Neutrophils 4.2      Absolute Lymphocytes 3.0      Absolute Monocytes 0.9      Absolute Eosinophils 0.8 (*)     Absolute Basophils 0.1      Absolute Immature Granulocytes 0.0      Absolute NRBCs 0.0     ISTAT GASES LACTATE VENOUS POCT - Abnormal    Lactic Acid POCT 1.6      Bicarbonate Venous POCT 33 (*)     O2 Sat, Venous POCT 73      pCO2 Venous POCT 49      pH Venous POCT 7.43      pO2 Venous POCT 38      Base Excess/Deficit (+/-) POCT 7.0 (*)    TROPONIN T, HIGH SENSITIVITY - Abnormal    Troponin T, High Sensitivity 50 (*)    PROCALCITONIN - Normal    Procalcitonin 0.08     INFLUENZA A/B, RSV AND SARS-COV2 PCR - Normal    Influenza A PCR Negative      Influenza B PCR Negative      RSV PCR Negative      SARS CoV2 PCR Negative     ROUTINE UA WITH MICROSCOPIC   BLOOD CULTURE   BLOOD CULTURE   URINE CULTURE     Imaging   CT Chest/Abdomen/Pelvis w Contrast   Preliminary Result   IMPRESSION:   1.  Interval moderate improvement in posterior right lower lobe infiltrates.   2.  Stable mild infiltrate/atelectasis in the left lower lobe.   3.  Tiny amount of free air in the right upper quadrant without evidence of bowel wall thickening or inflammation to  suggest perforation. This may be related to previous gastrojejunostomy tube placement, but this is more air than would be expected    greater than one month after placement.   4.  Stable cystic lesion in the tail of the pancreas. No apparent septations or nodularity. MRI with contrast would be recommended if one has not been performed.   5.  Stable low-attenuation lesion in the left hepatic lobe shows density greater than simple fluid. MRI would be useful for this as well.   6.  Subtle possible wall thickening along the right posterior bladder. Follow-up CT with delayed images through the bladder is recommended.      Report per radiology.     EKG   ECG taken at 0845, ECG read at 0905  Sinus tachycardia  Left anterior fascicular block   No significant change as compared to prior, dated 12/24/24.  Rate 115 bpm. VA interval 164 ms. QRS duration 86 ms. QT/QTc 336/464 ms. P-R-T axes 62 -48 66.    Independent Interpretation   None    ED Course      Medications Administered   Medications   sodium chloride (PF) 0.9% PF flush 3 mL (has no administration in time range)   sodium chloride (PF) 0.9% PF flush 3 mL (3 mLs Intracatheter Not Given 1/23/25 1134)   vancomycin (VANCOCIN) 1,750 mg in 0.9% NaCl 517.5 mL intermittent infusion (1,750 mg Intravenous $New Bag 1/23/25 1216)   lidocaine 1 % 0.1-1 mL (has no administration in time range)   lidocaine (LMX4) cream (has no administration in time range)   sodium chloride (PF) 0.9% PF flush 3 mL (3 mLs Intracatheter Not Given 1/23/25 1420)   sodium chloride (PF) 0.9% PF flush 3 mL (has no administration in time range)   senna-docusate (SENOKOT-S/PERICOLACE) 8.6-50 MG per tablet 1 tablet (has no administration in time range)     Or   senna-docusate (SENOKOT-S/PERICOLACE) 8.6-50 MG per tablet 2 tablet (has no administration in time range)   calcium carbonate (TUMS) chewable tablet 1,000 mg (has no administration in time range)   piperacillin-tazobactam (ZOSYN) 4.5 g vial to attach to   mL bag (has no administration in time range)   hydrocortisone sodium succinate PF (solu-CORTEF) injection 50 mg (50 mg Intravenous $Given 1/23/25 1339)   sodium chloride 0.45% infusion (has no administration in time range)   acetaminophen (TYLENOL) tablet 650 mg (has no administration in time range)     Or   acetaminophen (TYLENOL) Suppository 650 mg (has no administration in time range)   sodium chloride 0.9% BOLUS 1,000 mL (0 mLs Intravenous Stopped 1/23/25 1056)   lactated ringers BOLUS 1,244 mL (1,000 mLs Intravenous $New Bag 1/23/25 1133)   acetaminophen (TYLENOL) tablet 650 mg (650 mg Per Feeding Tube $Given 1/23/25 1129)   iopamidol (ISOVUE-370) solution 83 mL (83 mLs Intravenous $Given 1/23/25 1046)   sodium chloride 0.9 % bag 500 mL for CT scan flush use (65 mLs Intravenous $Given 1/23/25 1046)   piperacillin-tazobactam (ZOSYN) 4.5 g vial to attach to  mL bag (4.5 g Intravenous Not Given 1/23/25 1129)   piperacillin-tazobactam (ZOSYN) 4.5 g vial to attach to  mL bag (0 g Intravenous Stopped 1/23/25 1319)   carBAMazepine (TEGretol) suspension 150 mg (150 mg Per Feeding Tube $Given 1/23/25 1338)     Procedures   Procedures     ED Course/Discussion of Management   ED Course as of 01/23/25 1441   Thu Jan 23, 2025   0914 I obtained history and examined the patient as noted above.    1035 I spoke with Dr. Lazcano, hospitalist, regarding the patient's history and presentation in the emergency department today. Dr. Lazcano accepted the patient for admission.         Additional Documentation  None    Medical Decision Making / Diagnosis     CMS Diagnoses: The patient has signs of sepsis   Sepsis ED evaluation   The patient has signs of sepsis as evidenced by:  1. Presence of 2 SIRS criteria, suspected infection, AND  2. Organ dysfunction:  Hypoxia in setting of fever and cough.    Time zero: 12:43 PM  on 01/23/25 as this was the time when Provider determined that sepsis was  "present.    Lactic Acid Results:  Recent Labs   Lab Test 01/23/25  0913 12/24/24  0233 12/08/24  0441   LACT 1.6 2.0 2.4*       3 Hour Bundle 6 Hour Bundle (Reassessment)   Blood Cultures before IV Antibiotics: Yes  Antibiotics given: see below  Prehospital fluid volume (mL):       Prehospital IV Intake: 500       Prehospital Fluid Stop Time: 0900     Total fluids given (ED +Pre-hospital):  Full 30 mL/kg bolus given based on weight: 2,240 mL   Repeat Lactic Acid Level: Ordered by reflex for 2 hours after initial lactic acid collection.  Vasopressors: MAP>65 after initial IVF bolus, will continue to monitor fluid status and vital signs.  Repeat perfusion exam: I attest to having performed a repeat sepsis exam and assessment of perfusion at 12:44 PM .   BMI Readings from Last 1 Encounters:   01/09/25 23.68 kg/m        Anti-infectives (From admission through now)      Start     Dose/Rate Route Frequency Ordered Stop    01/23/25 1340  piperacillin-tazobactam (ZOSYN) 4.5 g vial to attach to  mL bag        Note to Pharmacy: For [facility], [facility] and [facility]: For Zosyn-naive patients, use the \"Zosyn initial dose + extended infusion\" order panel.    4.5 g  over 30 Minutes Intravenous EVERY 8 HOURS 01/23/25 1337      01/23/25 1200  piperacillin-tazobactam (ZOSYN) 4.5 g vial to attach to  mL bag        Note to Pharmacy: For [facility], [facility]: For Zosyn-naive patients, use the \"Zosyn initial dose + extended infusion\" order panel.    4.5 g  over 30 Minutes Intravenous ONCE 01/23/25 1157 01/23/25 1319    01/23/25 1115  vancomycin (VANCOCIN) 1,750 mg in 0.9% NaCl 517.5 mL intermittent infusion         1,750 mg  over 3 Hours Intravenous ONCE 01/23/25 1111      01/23/25 1040  piperacillin-tazobactam (ZOSYN) 4.5 g vial to attach to  mL bag        Note to Pharmacy: For [facility], [facility] and [facility]: For Zosyn-naive patients, use the \"Zosyn initial dose + extended infusion\" order panel.    4.5 g  over 30 Minutes Intravenous ONCE 01/23/25 1036 " 01/23/25 1159                [facility]    CT for PE was ordered because the patient is high risk for pulmonary embolism.    ARSENIO Farias is a 64 year old male with a complex past medical history who presents with fever, cough and hypoxia.  Patient had a broad workup pursued and concerning for recurrent aspiration pneumonia.  Patient did have CT imaging showing a interval moderate improvement in the posterior right lower lobe infiltrates.  There was also stable mild infiltrate in the left lower lobe.  There was a tiny amount of free air in the right upper quadrant without evidence of bowel wall thickening or inflammation to suggest perforation.  Unclear etiology for this air.  This could be related to prior feeding tube placement, but ultimately this is more air than would be expected.  Patient is not exquisitely tender in the abdomen or pelvis at this time but will defer to a surgical consultation upon admission.  Patient did receive broad-spectrum antibiotics out of concern for recurrent aspiration pneumonia and sepsis.  Patient is mildly hypoxic requiring supplementary oxygen, 2LPM O2 by NC.  I spoke with Dr. Lazcano of the hospitalist service who is agreeable to inpatient admission.    Critical Care time was 30 minutes for this patient excluding procedures.    Disposition   The patient was admitted to the hospital.     Diagnosis     ICD-10-CM    1. Recurrent aspiration pneumonia (H)  J69.0       2. Acute respiratory failure with hypoxia (H)  J96.01       3. Fever in adult  R50.9       4. Sepsis with acute hypoxic respiratory failure without septic shock, due to unspecified organism (H)  A41.9     R65.20     J96.01          Scribe Disclosure:  IMelany, am serving as a scribe at 9:24 AM on 1/23/2025 to document services personally performed by Anthony Keita MD based on my observations and the provider's statements to me.        Anthony Keita MD  01/23/25 9620       Anthony Keita MD  01/23/25  [id number]

## 2025-01-23 NOTE — PHARMACY-ADMISSION MEDICATION HISTORY
Pharmacist Admission Medication History    Admission medication history is complete. The information provided in this note is only as accurate as the sources available at the time of the update.    Information Source(s): Family member, Caregiver, and CareEverywhere/SureScripts via phone    Pertinent Information: None    Changes made to PTA medication list:  Added: None  Deleted: None  Changed:   Hydrocortisone 15mg AM + 7.5mg afternoon --> 15mg AM + 10mg afternoon  Vitamin C 3000mg daily --> 4000mg daily    Allergies reviewed with patient and updates made in EHR:  RN verified    Medication History Completed By: Louise Link [facility] 1/23/2025 12:44 PM    PTA Med List   Medication Sig Last Dose/Taking    acetaminophen (TYLENOL) 160 MG/5ML liquid Take 20.31 mLs (650 mg) by mouth every 6 hours as needed for mild pain or fever. Taking As Needed    acetaminophen (TYLENOL) 500 MG tablet Placed 500-1,000 mg into NG tube every 6 hours as needed for mild pain. Taking As Needed    acetylcysteine (MUCOMYST) 20 % neb solution Take 2 mLs by nebulization 4 times daily. (To use along with albuterol nebs) Taking    albuterol (PROVENTIL) (5 MG/ML) 0.5% neb solution Take 0.5 mLs (2.5 mg) by nebulization every 6 hours as needed for shortness of breath, wheezing or cough. 0700 1100 1500 1900 with mucomyst Taking As Needed    bacitracin 500 UNIT/GM external ointment Apply topically daily as needed for wound care To PEG site. Taking As Needed    Brivaracetam (BRIVIACT) 10 MG/ML solution Take 100 mg by mouth or Feeding Tube 2 times daily 0900, 2100 Taking    carBAMazepine (TEGRETOL) 100 MG/5ML suspension Take 7.5 mLs (150 mg) by mouth 3 times daily. 1/23/2025 at  6:00 AM    carBAMazepine (TEGRETOL) 100 MG/5ML suspension Place 5 mLs (100 mg) into Feeding Tube daily. 1/23/2025 at  9:00 AM    COMPOUNDED NON-CONTROLLED SUBSTANCE (CMPD RX) - PHARMACY TO MIX COMPOUNDED MEDICATION Scopolamine 0.4mg capsules - take  2 capsule by feeding tube  three times daily as needed Taking    Cyanocobalamin (VITAMIN B-12 PO) Place 1,000 mcg into Feeding Tube every morning. 1/22/2025    Dextromethorphan-guaiFENesin (MUCINEX FAST-MAX DM MAX) 5-100 MG/5ML LIQD Take 20 mLs by mouth every 4 hours as needed. Taking As Needed    glycopyrrolate (CUVPOSA) 1 MG/5ML solution Take 10 mLs (2 mg) by mouth 2 times daily. 1/22/2025    hydrocortisone (CORTAID) 1 % external cream Apply topically 2 times daily as needed Apply to reddened memo areas as needed Taking As Needed    hydrocortisone (CORTEF) 2 mg/mL SUSP Take 7.5 mLs (15 mg) by G tube in the morning, and 3.7mls (7.5mg) by G tube at 2PM (Patient taking differently: Take 7.5 mLs (15 mg) by G tube in the morning, and 5mls (10mg) by G tube at 2PM) 1/22/2025    levothyroxine (SYNTHROID/LEVOTHROID) 88 MCG tablet Take 1 tablet (88 mcg) by mouth daily. 1/23/2025 Morning    metoclopramide (REGLAN) 10 MG/10ML SOLN solution Take 10 mL by mouth 4 times daily 1/22/2025    miconazole (MICATIN) 2 % external powder Apply topically 2 times daily as needed Taking As Needed    multivitamin, therapeutic (THERA-VIT) TABS tablet Take 1 tablet by mouth daily. 1/22/2025    mupirocin (BACTROBAN) 2 % external ointment APPLY TOPICALLY TO THE AFFECTED AREA TWICE DAILY AS NEEDED Taking    NONFORMULARY Place 0.25 teaspoonful into Feeding Tube 2 times daily. Table salt 1/22/2025    pantoprazole (PROTONIX) 2 mg/mL SUSP suspension Place 20 mLs (40 mg) into Feeding Tube 2 times daily 1/22/2025    polyethylene glycol (MIRALAX) 17 GM/Dose powder Take 17 g by mouth daily. 1/22/2025    potassium & sodium phosphates (NEUTRA-PHOS) 280-160-250 MG Packet Take 1 packet by mouth daily 1/22/2025    sennosides (SENOKOT) 8.6 MG tablet Take 1 tablet by mouth 2 times daily as needed for constipation Taking As Needed    testosterone cypionate (DEPOTESTOSTERONE) 200 MG/ML injection INJECT 0.25ML IN THE MUSCLE ONCE A WEEK. 1/16/2025    vitamin C (ASCORBIC ACID) 1000 MG TABS  Take 4,000 mg by mouth or Feeding Tube daily. 1/22/2025    vitamin D3 (CHOLECALCIFEROL) 2000 units (50 mcg) tablet 4,000 Units by Per Feeding Tube route daily 1/22/2025

## 2025-01-24 ENCOUNTER — APPOINTMENT (OUTPATIENT)
Dept: GENERAL RADIOLOGY | Facility: CLINIC | Age: 65
DRG: 871 | End: 2025-01-24
Attending: HOSPITALIST
Payer: MEDICARE

## 2025-01-24 LAB
ALBUMIN SERPL BCG-MCNC: 3.4 G/DL (ref 3.5–5.2)
ALP SERPL-CCNC: 71 U/L (ref 40–150)
ALT SERPL W P-5'-P-CCNC: 38 U/L (ref 0–70)
ANION GAP SERPL CALCULATED.3IONS-SCNC: 10 MMOL/L (ref 7–15)
ANION GAP SERPL CALCULATED.3IONS-SCNC: 16 MMOL/L (ref 7–15)
AST SERPL W P-5'-P-CCNC: 85 U/L (ref 0–45)
B-OH-BUTYR SERPL-SCNC: <0.18 MMOL/L
BACTERIA UR CULT: NO GROWTH
BASE EXCESS BLDV CALC-SCNC: 1.1 MMOL/L (ref -3–3)
BASOPHILS # BLD AUTO: 0.1 10E3/UL (ref 0–0.2)
BASOPHILS NFR BLD AUTO: 1 %
BILIRUB DIRECT SERPL-MCNC: 0.24 MG/DL (ref 0–0.3)
BILIRUB SERPL-MCNC: 0.5 MG/DL
BUN SERPL-MCNC: 12.3 MG/DL (ref 8–23)
BUN SERPL-MCNC: 15.7 MG/DL (ref 8–23)
CALCIUM SERPL-MCNC: 7.5 MG/DL (ref 8.8–10.4)
CALCIUM SERPL-MCNC: 7.7 MG/DL (ref 8.8–10.4)
CHLORIDE SERPL-SCNC: 109 MMOL/L (ref 98–107)
CHLORIDE SERPL-SCNC: 109 MMOL/L (ref 98–107)
CREAT SERPL-MCNC: 0.92 MG/DL (ref 0.67–1.17)
CREAT SERPL-MCNC: 0.96 MG/DL (ref 0.67–1.17)
EGFRCR SERPLBLD CKD-EPI 2021: 89 ML/MIN/1.73M2
EGFRCR SERPLBLD CKD-EPI 2021: >90 ML/MIN/1.73M2
EOSINOPHIL # BLD AUTO: 0.1 10E3/UL (ref 0–0.7)
EOSINOPHIL NFR BLD AUTO: 1 %
ERYTHROCYTE [DISTWIDTH] IN BLOOD BY AUTOMATED COUNT: 15.2 % (ref 10–15)
GLUCOSE BLDC GLUCOMTR-MCNC: 115 MG/DL (ref 70–99)
GLUCOSE BLDC GLUCOMTR-MCNC: 121 MG/DL (ref 70–99)
GLUCOSE BLDC GLUCOMTR-MCNC: 122 MG/DL (ref 70–99)
GLUCOSE BLDC GLUCOMTR-MCNC: 127 MG/DL (ref 70–99)
GLUCOSE BLDC GLUCOMTR-MCNC: 135 MG/DL (ref 70–99)
GLUCOSE BLDC GLUCOMTR-MCNC: 145 MG/DL (ref 70–99)
GLUCOSE SERPL-MCNC: 140 MG/DL (ref 70–99)
GLUCOSE SERPL-MCNC: 150 MG/DL (ref 70–99)
HCO3 BLDV-SCNC: 28 MMOL/L (ref 21–28)
HCO3 SERPL-SCNC: 21 MMOL/L (ref 22–29)
HCO3 SERPL-SCNC: 25 MMOL/L (ref 22–29)
HCT VFR BLD AUTO: 39.1 % (ref 40–53)
HGB BLD-MCNC: 12 G/DL (ref 13.3–17.7)
IMM GRANULOCYTES # BLD: 0.1 10E3/UL
IMM GRANULOCYTES NFR BLD: 1 %
LACTATE SERPL-SCNC: 1.9 MMOL/L (ref 0.7–2)
LACTATE SERPL-SCNC: 3 MMOL/L (ref 0.7–2)
LACTATE SERPL-SCNC: 3.5 MMOL/L (ref 0.7–2)
LACTATE SERPL-SCNC: 4 MMOL/L (ref 0.7–2)
LYMPHOCYTES # BLD AUTO: 1.5 10E3/UL (ref 0.8–5.3)
LYMPHOCYTES NFR BLD AUTO: 14 %
MCH RBC QN AUTO: 27.8 PG (ref 26.5–33)
MCHC RBC AUTO-ENTMCNC: 30.7 G/DL (ref 31.5–36.5)
MCV RBC AUTO: 91 FL (ref 78–100)
MONOCYTES # BLD AUTO: 0.6 10E3/UL (ref 0–1.3)
MONOCYTES NFR BLD AUTO: 5 %
NEUTROPHILS # BLD AUTO: 8.3 10E3/UL (ref 1.6–8.3)
NEUTROPHILS NFR BLD AUTO: 78 %
NRBC # BLD AUTO: 0 10E3/UL
NRBC BLD AUTO-RTO: 0 /100
NT-PROBNP SERPL-MCNC: 1370 PG/ML (ref 0–900)
O2/TOTAL GAS SETTING VFR VENT: 21 %
OXYHGB MFR BLDV: 53 % (ref 70–75)
PCO2 BLDV: 55 MM HG (ref 40–50)
PH BLDV: 7.32 [PH] (ref 7.32–7.43)
PLATELET # BLD AUTO: 168 10E3/UL (ref 150–450)
PO2 BLDV: 32 MM HG (ref 25–47)
POTASSIUM SERPL-SCNC: 3.7 MMOL/L (ref 3.4–5.3)
POTASSIUM SERPL-SCNC: 3.8 MMOL/L (ref 3.4–5.3)
PROCALCITONIN SERPL IA-MCNC: 0.08 NG/ML
PROT SERPL-MCNC: 7 G/DL (ref 6.4–8.3)
RBC # BLD AUTO: 4.31 10E6/UL (ref 4.4–5.9)
SAO2 % BLDV: 54.1 % (ref 70–75)
SODIUM SERPL-SCNC: 144 MMOL/L (ref 135–145)
SODIUM SERPL-SCNC: 146 MMOL/L (ref 135–145)
WBC # BLD AUTO: 10.6 10E3/UL (ref 4–11)

## 2025-01-24 PROCEDURE — 99207 PR NO BILLABLE SERVICE THIS VISIT: CPT

## 2025-01-24 PROCEDURE — 82010 KETONE BODYS QUAN: CPT

## 2025-01-24 PROCEDURE — 250N000011 HC RX IP 250 OP 636: Performed by: HOSPITALIST

## 2025-01-24 PROCEDURE — 250N000013 HC RX MED GY IP 250 OP 250 PS 637: Performed by: HOSPITALIST

## 2025-01-24 PROCEDURE — 83880 ASSAY OF NATRIURETIC PEPTIDE: CPT

## 2025-01-24 PROCEDURE — 83605 ASSAY OF LACTIC ACID: CPT | Performed by: HOSPITALIST

## 2025-01-24 PROCEDURE — 84155 ASSAY OF PROTEIN SERUM: CPT

## 2025-01-24 PROCEDURE — 36415 COLL VENOUS BLD VENIPUNCTURE: CPT | Performed by: HOSPITALIST

## 2025-01-24 PROCEDURE — 94640 AIRWAY INHALATION TREATMENT: CPT | Mod: 76

## 2025-01-24 PROCEDURE — 94640 AIRWAY INHALATION TREATMENT: CPT

## 2025-01-24 PROCEDURE — 36415 COLL VENOUS BLD VENIPUNCTURE: CPT

## 2025-01-24 PROCEDURE — 82248 BILIRUBIN DIRECT: CPT

## 2025-01-24 PROCEDURE — 99231 SBSQ HOSP IP/OBS SF/LOW 25: CPT | Performed by: PHYSICIAN ASSISTANT

## 2025-01-24 PROCEDURE — 250N000009 HC RX 250: Performed by: HOSPITALIST

## 2025-01-24 PROCEDURE — 999N000157 HC STATISTIC RCP TIME EA 10 MIN

## 2025-01-24 PROCEDURE — 82805 BLOOD GASES W/O2 SATURATION: CPT | Performed by: HOSPITALIST

## 2025-01-24 PROCEDURE — 71045 X-RAY EXAM CHEST 1 VIEW: CPT

## 2025-01-24 PROCEDURE — 99233 SBSQ HOSP IP/OBS HIGH 50: CPT | Performed by: HOSPITALIST

## 2025-01-24 PROCEDURE — 250N000012 HC RX MED GY IP 250 OP 636 PS 637: Performed by: HOSPITALIST

## 2025-01-24 PROCEDURE — 258N000003 HC RX IP 258 OP 636: Performed by: HOSPITALIST

## 2025-01-24 PROCEDURE — 83605 ASSAY OF LACTIC ACID: CPT

## 2025-01-24 PROCEDURE — 84075 ASSAY ALKALINE PHOSPHATASE: CPT

## 2025-01-24 PROCEDURE — G0463 HOSPITAL OUTPT CLINIC VISIT: HCPCS

## 2025-01-24 PROCEDURE — 99418 PROLNG IP/OBS E/M EA 15 MIN: CPT | Performed by: HOSPITALIST

## 2025-01-24 PROCEDURE — 120N000001 HC R&B MED SURG/OB

## 2025-01-24 PROCEDURE — 85025 COMPLETE CBC W/AUTO DIFF WBC: CPT | Performed by: HOSPITALIST

## 2025-01-24 PROCEDURE — 80048 BASIC METABOLIC PNL TOTAL CA: CPT | Performed by: HOSPITALIST

## 2025-01-24 PROCEDURE — 82310 ASSAY OF CALCIUM: CPT | Performed by: HOSPITALIST

## 2025-01-24 PROCEDURE — 82565 ASSAY OF CREATININE: CPT | Performed by: HOSPITALIST

## 2025-01-24 RX ORDER — DEXTROSE MONOHYDRATE 100 MG/ML
INJECTION, SOLUTION INTRAVENOUS CONTINUOUS PRN
Status: DISCONTINUED | OUTPATIENT
Start: 2025-01-24 | End: 2025-01-28 | Stop reason: HOSPADM

## 2025-01-24 RX ORDER — NICOTINE POLACRILEX 4 MG
15-30 LOZENGE BUCCAL
Status: DISCONTINUED | OUTPATIENT
Start: 2025-01-24 | End: 2025-01-28 | Stop reason: HOSPADM

## 2025-01-24 RX ORDER — DEXTROSE MONOHYDRATE 25 G/50ML
25-50 INJECTION, SOLUTION INTRAVENOUS
Status: DISCONTINUED | OUTPATIENT
Start: 2025-01-24 | End: 2025-01-28 | Stop reason: HOSPADM

## 2025-01-24 RX ORDER — ALBUTEROL SULFATE 0.83 MG/ML
2.5 SOLUTION RESPIRATORY (INHALATION) EVERY 4 HOURS PRN
Status: DISCONTINUED | OUTPATIENT
Start: 2025-01-24 | End: 2025-01-28 | Stop reason: HOSPADM

## 2025-01-24 RX ORDER — ALBUTEROL SULFATE 0.83 MG/ML
2.5 SOLUTION RESPIRATORY (INHALATION) 4 TIMES DAILY
Status: DISCONTINUED | OUTPATIENT
Start: 2025-01-24 | End: 2025-01-28 | Stop reason: HOSPADM

## 2025-01-24 RX ORDER — ALBUTEROL SULFATE 0.83 MG/ML
2.5 SOLUTION RESPIRATORY (INHALATION) EVERY 6 HOURS PRN
Status: DISCONTINUED | OUTPATIENT
Start: 2025-01-24 | End: 2025-01-24

## 2025-01-24 RX ADMIN — HYDROCORTISONE SODIUM SUCCINATE 50 MG: 100 INJECTION, POWDER, FOR SOLUTION INTRAMUSCULAR; INTRAVENOUS at 17:19

## 2025-01-24 RX ADMIN — HYDROCORTISONE SODIUM SUCCINATE 50 MG: 100 INJECTION, POWDER, FOR SOLUTION INTRAMUSCULAR; INTRAVENOUS at 09:23

## 2025-01-24 RX ADMIN — PIPERACILLIN AND TAZOBACTAM 4.5 G: 4; .5 INJECTION, POWDER, FOR SOLUTION INTRAVENOUS at 20:44

## 2025-01-24 RX ADMIN — ACETYLCYSTEINE 2 ML: 200 SOLUTION ORAL; RESPIRATORY (INHALATION) at 15:32

## 2025-01-24 RX ADMIN — ACETYLCYSTEINE 2 ML: 200 SOLUTION ORAL; RESPIRATORY (INHALATION) at 20:17

## 2025-01-24 RX ADMIN — METOCLOPRAMIDE HYDROCHLORIDE 10 MG: 5 SOLUTION ORAL at 21:42

## 2025-01-24 RX ADMIN — ALBUTEROL SULFATE 2.5 MG: 2.5 SOLUTION RESPIRATORY (INHALATION) at 15:32

## 2025-01-24 RX ADMIN — Medication 40 MG: at 20:44

## 2025-01-24 RX ADMIN — SODIUM CHLORIDE 500 ML: 9 INJECTION, SOLUTION INTRAVENOUS at 15:11

## 2025-01-24 RX ADMIN — Medication 40 MG: at 11:01

## 2025-01-24 RX ADMIN — CARBAMAZEPINE 150 MG: 100 SUSPENSION ORAL at 11:04

## 2025-01-24 RX ADMIN — CARBAMAZEPINE 100 MG: 100 SUSPENSION ORAL at 17:19

## 2025-01-24 RX ADMIN — PIPERACILLIN AND TAZOBACTAM 4.5 G: 4; .5 INJECTION, POWDER, FOR SOLUTION INTRAVENOUS at 02:05

## 2025-01-24 RX ADMIN — CARBAMAZEPINE 150 MG: 100 SUSPENSION ORAL at 06:26

## 2025-01-24 RX ADMIN — METOCLOPRAMIDE HYDROCHLORIDE 10 MG: 5 SOLUTION ORAL at 06:25

## 2025-01-24 RX ADMIN — ALBUTEROL SULFATE 2.5 MG: 2.5 SOLUTION RESPIRATORY (INHALATION) at 11:06

## 2025-01-24 RX ADMIN — LEVOTHYROXINE SODIUM 88 MCG: 88 TABLET ORAL at 11:02

## 2025-01-24 RX ADMIN — HYDROCORTISONE SODIUM SUCCINATE 50 MG: 100 INJECTION, POWDER, FOR SOLUTION INTRAMUSCULAR; INTRAVENOUS at 01:48

## 2025-01-24 RX ADMIN — METOCLOPRAMIDE HYDROCHLORIDE 10 MG: 5 SOLUTION ORAL at 11:03

## 2025-01-24 RX ADMIN — ACETYLCYSTEINE 2 ML: 200 SOLUTION ORAL; RESPIRATORY (INHALATION) at 11:05

## 2025-01-24 RX ADMIN — BRIVARACETAM 100 MG: 10 SOLUTION ORAL at 21:42

## 2025-01-24 RX ADMIN — MULTIVITAMIN TABLET 1 TABLET: TABLET at 11:00

## 2025-01-24 RX ADMIN — CYANOCOBALAMIN TAB 1000 MCG 1000 MCG: 1000 TAB at 11:00

## 2025-01-24 RX ADMIN — BRIVARACETAM 100 MG: 10 SOLUTION ORAL at 12:20

## 2025-01-24 RX ADMIN — GLYCOPYRROLATE 2 MG: 1 TABLET ORAL at 20:44

## 2025-01-24 RX ADMIN — PIPERACILLIN AND TAZOBACTAM 4.5 G: 4; .5 INJECTION, POWDER, FOR SOLUTION INTRAVENOUS at 09:23

## 2025-01-24 RX ADMIN — METOCLOPRAMIDE HYDROCHLORIDE 10 MG: 5 SOLUTION ORAL at 17:18

## 2025-01-24 RX ADMIN — INSULIN ASPART 1 UNITS: 100 INJECTION, SOLUTION INTRAVENOUS; SUBCUTANEOUS at 21:45

## 2025-01-24 RX ADMIN — ALBUTEROL SULFATE 2.5 MG: 2.5 SOLUTION RESPIRATORY (INHALATION) at 20:17

## 2025-01-24 RX ADMIN — CARBAMAZEPINE 150 MG: 100 SUSPENSION ORAL at 00:46

## 2025-01-24 RX ADMIN — GLYCOPYRROLATE 2 MG: 1 TABLET ORAL at 11:01

## 2025-01-24 RX ADMIN — PIPERACILLIN AND TAZOBACTAM 4.5 G: 4; .5 INJECTION, POWDER, FOR SOLUTION INTRAVENOUS at 15:11

## 2025-01-24 RX ADMIN — POLYETHYLENE GLYCOL 3350 17 G: 17 POWDER, FOR SOLUTION ORAL at 11:00

## 2025-01-24 ASSESSMENT — ACTIVITIES OF DAILY LIVING (ADL)
ADLS_ACUITY_SCORE: 72
ADLS_ACUITY_SCORE: 66
ADLS_ACUITY_SCORE: 76
ADLS_ACUITY_SCORE: 66
ADLS_ACUITY_SCORE: 73
DEPENDENT_IADLS:: CLEANING;COOKING;LAUNDRY;SHOPPING;MEAL PREPARATION;MEDICATION MANAGEMENT;MONEY MANAGEMENT;TRANSPORTATION
ADLS_ACUITY_SCORE: 72
ADLS_ACUITY_SCORE: 74
ADLS_ACUITY_SCORE: 73
ADLS_ACUITY_SCORE: 73
ADLS_ACUITY_SCORE: 66
ADLS_ACUITY_SCORE: 73
ADLS_ACUITY_SCORE: 73
ADLS_ACUITY_SCORE: 83
ADLS_ACUITY_SCORE: 73
ADLS_ACUITY_SCORE: 73
ADLS_ACUITY_SCORE: 76
ADLS_ACUITY_SCORE: 73
ADLS_ACUITY_SCORE: 72
ADLS_ACUITY_SCORE: 79
ADLS_ACUITY_SCORE: 79
ADLS_ACUITY_SCORE: 73
ADLS_ACUITY_SCORE: 73
ADLS_ACUITY_SCORE: 72

## 2025-01-24 NOTE — CODE/RAPID RESPONSE
[facility]    Sepsis Evaluation Progress Note    Date of Service: 01/24/2025    I was called to see Keyon Farias due to  elevated lactic acid of 4.0 . He is known to have an infection.     Lab:  Lactic Acid   Date Value Ref Range Status   01/24/2025 4.0 (HH) 0.7 - 2.0 mmol/L Final   05/23/2021 2.1 (H) 0.7 - 2.0 mmol/L Final         Assessment and Plan    Lactic Acidosis suspect 2/2 fluid overload vs. Sepsis vs. Liver dysfunction   Upon arrival patient is non-toxic appearing, not in acute distress. He is alert, at his baseline mental status. His mom is present at bedside. On exam skin is warm and dry. Does not appear to be fluid overloaded on exam. Lungs are clear bilaterally. Abdomen is soft, mildly distended. PEG tube in place, no purulent drainage or erythema surrounding insertion site. Patient has received 1,244 mL LR yesterday, followed by 1/2 NS at 100 mL/hr. Patient may be fluid overloaded.      The SIRS criteria and exam findings are likely due to NO EVIDENCE OF SEPSIS at this time.  Vital sign, physical exam, and lab findings are due to fluid overload..    Interventions:  - procalcitonin 0.08  - hepatic panel; mildly elevated AST (85), which patient has had intermittent elevation over the last 6 months.  - serum ketones < 0.18  - Pro-BNP 1,370  - hold IVFs until proBNP; recommend discontinuing IVFs if proBNP elevated  - Discussed with Dr. Costa who will be discussing recommended CT for further evaluation of bladder wall thickening seen on CT yesterday.    Physical Exam  General:  Appears stated age, no acute distress. At baseline mental status. Nods yes/no appropriately to questions.  Skin:  Warm, dry. No rashes or lesions on exposed skin.  HEENT:  Normocephalic, atraumatic.  Neck:  Supple.  Chest:  Breath sounds CTA and no increased work of breathing on room air.  Cardiovascular:  RRR, no rub or murmur. No peripheral edema.  Abdomen:  Soft, non-tender, slightly distended. PEG tube  "in place without erythema or purulent drainage at insertion site.  Neurological:  No new focal neurological deficits.   Psychiatric:  Affect and mood congruent.    Vital Signs:  Temp: 97.4  F (36.3  C) Temp src: Axillary BP: 113/72 Pulse: 61   Resp: 20 SpO2: 97 % O2 Device: None (Room air) Oxygen Delivery: 2 LPM  ID: The patient is currently on the following antibiotics:  Anti-infectives (From now, onward)      Start     Dose/Rate Route Frequency Ordered Stop    01/23/25 1800  piperacillin-tazobactam (ZOSYN) 4.5 g vial to attach to  mL bag        Note to Pharmacy: For [facility], [facility] and [facility]: For Zosyn-naive patients, use the \"Zosyn initial dose + extended infusion\" order panel.    4.5 g  over 30 Minutes Intravenous EVERY 6 HOURS 01/23/25 1337            Current antibiotic coverage is appropriate for source of infection.      Fluid: Fluid bolus not indicated due to: concern for fluid overload .    Lab: Repeat lactic acid ordered by reflex for 2 hours from initial collection.    Disposition: The patient will remain on the current unit. We will continue to monitor this patient closely. and will be transferred to [facility].      Sammy Grimaldo NP    Securely message with the Vocera Web Console (learn more here)  Text page via [employer] Paging/Directory      Medical Decision Making       30 MINUTES SPENT BY ME on the date of service doing chart review, history, exam, documentation & further activities per the note.       "

## 2025-01-24 NOTE — CONSULTS
"CLINICAL NUTRITION SERVICES - ASSESSMENT NOTE    Malnutrition Status:    % Intake: Decreased intake does not meet criteria  % Weight Loss: Unable to assess  - no current wt on file   Subcutaneous Fat Loss: Orbital: Mild and Triceps: Mild - chronic given hemiplegia   Muscle Loss: Wasting of the temples (temporalis muscle): Mild and Clavicles (pectoralis and deltoids): Mild - chronic given hemiplegia   Fluid Accumulation/Edema: Does not meet criteria  Malnutrition Diagnosis: Patient does not meet criteria for malnutrition     Registered Dietitian Interventions:  - Weigh patient     - Begin TF with goal as follows:   Jevity 1.5 @ 60 mL/hr x22 hours (held x2 hours for levothyroxine) to provide 1320 mL, 1980 kcal, 85 g protein, 285 g CHO, 28 g fiber, 1003 mL free water.   - Start TF @ 15 mL/hr. Advance by 15 mL q 8 hours until goal rate reached.   - Flush 120 mL q 4 hours for hydration        REASON FOR ASSESSMENT  Provider order - Registered Dietitian to order TF per Medical Nutrition Therapy Guidelines    SUBJECTIVE INFORMATION  Assessed patient in room.  Called patient's mother Savannah to obtain recent nutr hx.     NUTRITION HISTORY  Pt is well-known to clinical nutrition services at [facility].   Has a GJT for chronic tube feeds. Tube feeds via J-port, meds via G-port.   - Savannah reports most recent home formula is Boost High Protein x6 cartons per day (20 g protein, 250 kcal per carton) - provides 1500 kcal, 120 g protein.   - She has been trying to find a formula, per her report, that will decrease the \"slime\" in her throat.   - Savannah feels [employer] made the phlegm/secretions worse, it was better when he was getting Jevity.   ** She is OK with restarting Jevity 1.5 here until he is back home **    - She thinks he may be losing weight, wonders if she is giving enough Boost.     CURRENT NUTRITION ORDERS  Diet: NPO    CURRENT INTAKE/TOLERANCE  Tube feeds on hold since admission.    LABS  Nutrition-relevant labs:  Na 146 " "(H)    MEDICATIONS  Nutrition-relevant medications:  Vitamin B12, Low sliding scale insulin, Reglan, Thera vit, miralax, NaCl @ 100 mL/hr, levothyroxine     ANTHROPOMETRICS  Height: 5' 10\"  Most Recent Weight:  74.8 kg - measured 1/9  IBW: ~63 kg - adjusted for hemipletia  BMI not validated in hemiplegia   Weight History:  Wt Readings from Last 10 Encounters:   01/09/25 74.8 kg (165 lb)   12/13/24 82 kg (180 lb 12.4 oz)   12/01/24 68 kg (150 lb)   11/03/24 68.1 kg (150 lb 1.6 oz)   10/21/24 68.1 kg (150 lb 2.1 oz)   09/18/24 68.1 kg (150 lb 2.1 oz)   08/31/24 67 kg (147 lb 11.3 oz)   08/05/24 67.6 kg (149 lb)   07/24/24 67.9 kg (149 lb 11.1 oz)   07/12/24 69.4 kg (153 lb)     ASSESSED NUTRITION NEEDS  Dosing Weight 74 kg (actual, admit wt on 12/7)   Estimated Energy Needs: 3785-8971 kcals (25-30 Kcal/Kg)  Justification: maintenance  Estimated Protein Needs:  grams protein (1.2-1.5 g pro/Kg)  Justification: hypercatabolism with acute illness    SYSTEM FINDINGS    Skin/wounds: intact    MALNUTRITION  % Intake: Decreased intake does not meet criteria  % Weight Loss: Unable to assess  - no current wt on file   Subcutaneous Fat Loss: Orbital: Mild and Triceps: Mild - chronic given hemiplegia   Muscle Loss: Wasting of the temples (temporalis muscle): Mild and Clavicles (pectoralis and deltoids): Mild - chronic given hemiplegia   Fluid Accumulation/Edema: Does not meet criteria  Malnutrition Diagnosis: Patient does not meet criteria for malnutrition     NUTRITION DIAGNOSIS  Inadequate protein energy intake related to transfer of care as evidenced by TF held since admission.     INTERVENTIONS  Enteral nutrition management  Management of flushing of feeding tube  See nutrition interventions above    Goals  TF @ goal to provide % estimated needs.     Monitoring/Evaluation  Progress toward goals will be monitored and evaluated per policy.    Marisel Fernández RD, KECIA  Pager: [phone]  Available on Vocera    "
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
## 2025-01-24 NOTE — PROGRESS NOTES
General Surgery Progress Note    Admission Date: 1/23/2025  Today's Date: 1/24/2025         Assessment:      Keyon Farias is a 64 year old male     Diagnosis: Fever, hypoxia, RUQ free air without other evidence of perforation         Plan:   Ok to restart tube feeds in my opinion.  His abdominal exam is benign.  Watch for changes as start to ramp up to goal.  Discussed with Dr. Foley on for ACS.  General Surgery may just chart check and follow peripherally from now.  Please reach out if you feel we should lay eyes on patient again.  We are happy to see him.        Interval History:   Re-introduced myself to patient.  I helped on his appy in 2013.  He smiled about that.  Many thumbs up when asking about his abdominal pain.  He denies pain.  He is happy no surgical indications at this time.          Physical Exam:   /76   Pulse 61   Temp 97.4  F (36.3  C) (Axillary)   Resp 19   SpO2 99%   I/O last 3 completed shifts:  In: 2776 [I.V.:1487; NG/GT:189; IV Piggyback:1100]  Out: 1050 [Urine:1050]  General: NAD, pleasant, alert and oriented x3  Abdomen: soft, non tender, non distended, + bowel sounds.  G-tube: no erythema near.  Looks appropriately positioned    LABS:  Results for orders placed or performed during the hospital encounter of 01/23/25 (from the past 24 hours)   CT Chest/Abdomen/Pelvis w Contrast    Narrative    EXAM: CT CHEST/ABDOMEN/PELVIS W CONTRAST  LOCATION: [facility]  DATE: 1/23/2025    INDICATION: Hypoxic, fever, sepsis. Concern for recurrent PNA vs intraabdominal source of sepsis. Patient non verbal.  COMPARISON: 12/06/2024.  TECHNIQUE: CT scan of the chest, abdomen, and pelvis was performed following injection of IV contrast. Multiplanar reformats were obtained. Dose reduction techniques were used.   CONTRAST: 83 mL Isovue 370.    FINDINGS:   LUNGS AND PLEURA: Mild infiltrates in the posterior right lower lobe have improved moderately since the previous exam. Mild  infiltrate/atelectasis in the posterior left lower lobe is unchanged. Otherwise, the lungs are clear. Some mucus is seen in the   distal trachea at the marcella. No consolidation or effusion.    MEDIASTINUM/AXILLAE: No axillary or mediastinal adenopathy. The aorta is normal in caliber. No pericardial effusion.    CORONARY ARTERY CALCIFICATION: None.    HEPATOBILIARY: Stable 16 mm low-attenuation lesion in the left hepatic lobe is denser than simple fluid. This may represent a complicated cyst or hemangioma. Dedicated MRI could better evaluate this versus continued follow-up. The portal vein is patent.    PANCREAS: Stable 3.0 cm cystic lesion in the tail of the pancreas. The remainder of the pancreas is unremarkable.    SPLEEN: Normal.    ADRENAL GLANDS: Normal.    KIDNEYS/BLADDER: No enhancing mass or hydronephrosis through either kidney. Along the right posterior aspect of the bladder there is some possible subtle wall thickening measuring up to 14 mm versus debris within the bladder. Follow-up contrast-enhanced   CT with delayed images through the bladder would be recommended.    BOWEL: No bowel obstruction. No colonic wall thickening or inflammatory change. Tiny amount of free air is seen in the right upper quadrant where there is interposition of the right colon in front of the liver. No evidence for wall thickening or   inflammation to suggest perforated viscus. This could be related to the recent placement of the gastrostomy tube, but this is more air than would be expected more than one month after placement. Gastrojejunostomy tube is present and in appropriate   position.    LYMPH NODES: No adenopathy or fluid collections through the abdomen and pelvis.    VASCULATURE: Normal.    PELVIC ORGANS: No free fluid or adenopathy in the pelvis.    MUSCULOSKELETAL: Mild degenerative changes are noted through the spine.      Impression    IMPRESSION:  1.  Interval moderate improvement in posterior right lower lobe  infiltrates.  2.  Stable mild infiltrate/atelectasis in the left lower lobe.  3.  Tiny amount of free air in the right upper quadrant without evidence of bowel wall thickening or inflammation to suggest perforation. This may be related to previous gastrojejunostomy tube placement, but this is more air than would be expected   greater than one month after placement.  4.  Stable cystic lesion in the tail of the pancreas. No apparent septations or nodularity. MRI with contrast would be recommended if one has not been performed.  5.  Stable low-attenuation lesion in the left hepatic lobe shows density greater than simple fluid. MRI would be useful for this as well.  6.  Subtle possible wall thickening along the right posterior bladder. Follow-up CT with delayed images through the bladder is recommended.   Troponin T, High Sensitivity   Result Value Ref Range    Troponin T, High Sensitivity 50 (H) <=22 ng/L   iStat Gases (lactate) venous, POCT   Result Value Ref Range    Lactic Acid POCT 3.2 (H) <=2.0 mmol/L    Bicarbonate Venous POCT 16 (L) 21 - 28 mmol/L    O2 Sat, Venous POCT 85 (H) 70 - 75 %    pCO2 Venous POCT 24 (L) 40 - 50 mm Hg    pH Venous POCT 7.43 7.32 - 7.43    pO2 Venous POCT 47 25 - 47 mm Hg    Base Excess/Deficit (+/-) POCT -8.0 (L) -3.0 - 3.0 mmol/L   Basic metabolic panel   Result Value Ref Range    Sodium 143 135 - 145 mmol/L    Potassium 5.4 (H) 3.4 - 5.3 mmol/L    Chloride 106 98 - 107 mmol/L    Carbon Dioxide (CO2) 26 22 - 29 mmol/L    Anion Gap 11 7 - 15 mmol/L    Urea Nitrogen 26.3 (H) 8.0 - 23.0 mg/dL    Creatinine 1.18 (H) 0.67 - 1.17 mg/dL    GFR Estimate 70 >60 mL/min/1.73m2    Calcium 7.4 (L) 8.8 - 10.4 mg/dL    Glucose 133 (H) 70 - 99 mg/dL   Lactic acid whole blood   Result Value Ref Range    Lactic Acid 2.3 (H) 0.7 - 2.0 mmol/L   Lactic acid whole blood   Result Value Ref Range    Lactic Acid 2.1 (H) 0.7 - 2.0 mmol/L   UA with Microscopic   Result Value Ref Range    Color Urine Light Yellow  Colorless, Straw, Light Yellow, Yellow    Appearance Urine Clear Clear    Glucose Urine Negative Negative mg/dL    Bilirubin Urine Negative Negative    Ketones Urine Negative Negative mg/dL    Specific Gravity Urine 1.015 1.003 - 1.035    Blood Urine Trace (A) Negative    pH Urine 8.0 (H) 5.0 - 7.0    Protein Albumin Urine Negative Negative mg/dL    Urobilinogen Urine Normal Normal, 2.0 mg/dL    Nitrite Urine Negative Negative    Leukocyte Esterase Urine Negative Negative    Bacteria Urine Few (A) None Seen /HPF    RBC Urine <1 <=2 /HPF    WBC Urine <1 <=5 /HPF   Lactic acid whole blood   Result Value Ref Range    Lactic Acid 1.9 0.7 - 2.0 mmol/L   Glucose by meter   Result Value Ref Range    GLUCOSE BY METER POCT 122 (H) 70 - 99 mg/dL   Glucose by meter   Result Value Ref Range    GLUCOSE BY METER POCT 127 (H) 70 - 99 mg/dL     *Note: Due to a large number of results and/or encounters for the requested time period, some results have not been displayed. A complete set of results can be found in Results Review.           David Mckinney PA-C  Pager: [phone]  [facility]: [phone]

## 2025-01-24 NOTE — PLAN OF CARE
[id number]    Orientations: AxOx0-1, alert w/ garbled/incoherent speech. Follows simple commands.  Vitals/Pain: VSS on RA. No obvious non-verbal signs of pain.  Tele: SR  Lines/Drains: PIV x1, infusing 1/2NS at 100. GJ tube.  Skin/Wounds: Blanchable redness to coccyx/buttocks. Peeling/psoriasis to scalp/face. Redness around enteral tube site. Scattered bruises.  GI/: NPO; carb count ordered. External catheter in place with adequate output. No BM.  Labs: BG q4h: 122 at 0600.   Ambulation/Assist: Assist x1-2 w/ lift, turn and repo q2h. Able to help somewhat with turns.  Sleep Quality: Sleeping between cares.    Plan: Continue IV abx, IVF, IV steroids.

## 2025-01-24 NOTE — PLAN OF CARE
Problem: Comorbidity Management  Goal: Maintenance of Behavioral Health Symptom Control  Outcome: Progressing  Intervention: Maintain Behavioral Health Symptom Control  Recent Flowsheet Documentation  Taken 1/24/2025 1200 by Shaneka Schneider RN  Medication Review/Management: medications reviewed  Taken 1/24/2025 0800 by Shaneka Schneider RN  Medication Review/Management: medications reviewed  Goal: Maintenance of Seizure Control  Outcome: Progressing  Intervention: Maintain Seizure Symptom Control  Recent Flowsheet Documentation  Taken 1/24/2025 1200 by Shaneka Schneider RN  Sensory Stimulation Regulation:   care clustered   lighting decreased   quiet environment promoted  Medication Review/Management: medications reviewed  Taken 1/24/2025 0800 by Shaneka Schneider RN  Sensory Stimulation Regulation:   care clustered   lighting decreased   quiet environment promoted  Medication Review/Management: medications reviewed     Problem: Pneumonia  Goal: Fluid Balance  Outcome: Progressing  Intervention: Monitor and Manage Fluid Balance  Recent Flowsheet Documentation  Taken 1/24/2025 1200 by Shaneka Schneider RN  Fluid/Electrolyte Management: fluids provided  Taken 1/24/2025 0800 by Shaneka Schneider RN  Fluid/Electrolyte Management: fluids provided  Goal: Resolution of Infection Signs and Symptoms  Outcome: Progressing  Intervention: Prevent Infection Progression  Recent Flowsheet Documentation  Taken 1/24/2025 1200 by Shaneka Schneider RN  Isolation Precautions: contact precautions maintained  Taken 1/24/2025 0800 by Shaneka Schneider RN  Isolation Precautions: contact precautions maintained     Problem: Skin Injury Risk Increased  Goal: Skin Health and Integrity  Outcome: Progressing  Intervention: Plan: Nurse Driven Intervention: Moisture Management  Recent Flowsheet Documentation  Taken 1/24/2025 1200 by Shaneka Schneider RN  Moisture Interventions: Incontinence pad  Bathing/Skin Care:   dressed/undressed   electrode patches/site rotation  Taken 1/24/2025 0800 by  Schneider, Shaneka, RN  Moisture Interventions: Incontinence pad  Bathing/Skin Care:   dressed/undressed   electrode patches/site rotation  Intervention: Plan: Nurse Driven Intervention: Friction and Shear  Recent Flowsheet Documentation  Taken 1/24/2025 1200 by Shaneka Schneider RN  Friction/Shear Interventions: Silicone foam sacral dressing  Taken 1/24/2025 0800 by Shaneka Schneider RN  Friction/Shear Interventions: Silicone foam sacral dressing  Intervention: Optimize Skin Protection  Recent Flowsheet Documentation  Taken 1/24/2025 1200 by Shaneka Schneider RN  Pressure Reduction Techniques: heels elevated off bed  Pressure Reduction Devices: positioning supports utilized  Skin Protection:   adhesive use limited   incontinence pads utilized  Activity Management: activity adjusted per tolerance  Head of Bed (HOB) Positioning: HOB at 45 degrees  Taken 1/24/2025 0800 by Shaneka Schneider RN  Pressure Reduction Techniques: heels elevated off bed  Pressure Reduction Devices: positioning supports utilized  Skin Protection:   adhesive use limited   incontinence pads utilized  Activity Management: activity adjusted per tolerance  Head of Bed (HOB) Positioning: HOB at 45 degrees     Problem: Gas Exchange Impaired  Goal: Optimal Gas Exchange  Outcome: Met  Intervention: Optimize Oxygenation and Ventilation  Recent Flowsheet Documentation  Taken 1/24/2025 1200 by Shaneka Schneider RN  Airway/Ventilation Management: airway patency maintained  Head of Bed (HOB) Positioning: HOB at 45 degrees  Taken 1/24/2025 0800 by Shaneka Schneider RN  Airway/Ventilation Management: airway patency maintained  Head of Bed (HOB) Positioning: HOB at 45 degrees     Problem: Pneumonia  Goal: Effective Oxygenation and Ventilation  Outcome: Met  Intervention: Promote Airway Secretion Clearance  Recent Flowsheet Documentation  Taken 1/24/2025 1200 by Shaneka Schneider RN  Breathing Techniques/Airway Clearance: deep/controlled cough encouraged  Cough And Deep Breathing: done with  encouragement  Activity Management: activity adjusted per tolerance  Taken 1/24/2025 0800 by Shaneka Schneider RN  Breathing Techniques/Airway Clearance: deep/controlled cough encouraged  Cough And Deep Breathing: done with encouragement  Activity Management: activity adjusted per tolerance  Intervention: Optimize Oxygenation and Ventilation  Recent Flowsheet Documentation  Taken 1/24/2025 1200 by Shaneka Schneider RN  Airway/Ventilation Management: airway patency maintained  Head of Bed (HOB) Positioning: HOB at 45 degrees  Taken 1/24/2025 0800 by Shaneka Schneider RN  Airway/Ventilation Management: airway patency maintained  Head of Bed (HOB) Positioning: HOB at 45 degrees   Goal Outcome Evaluation:    Orientations: Alert and HECTOR orientation. Hx of TBI. Has guardians  Tele:Normal Sinus - discontinued tele  Pain:denies but cries out in pain sometimes with repositioning - patient then gives thumbs up and denies pain. Possible pain in back or right contracted arm...? But patient denies.   Vitals:/69 (BP Location: Left arm)   Pulse 82   Temp 97.8  F (36.6  C) (Oral)   Resp 16   SpO2 99%     Respiratory: The patient demonstrates: congested cough decreased breath sounds throughout. The patient is maintaining saturations >92% on room air at rest.   Cardiac:No signs of bleeding. Regular rhythm and S1 S2. Edema: none  Radial Pulses 2+. Pedial Pulses 2+.  Neuros:Hx of TBI, right spastic hemiplegia, aphasia, and seizure disorder.   GI: guarded, flat, slightly tender near GJ tube with palpation and Bowel sounds normoactive. BM this shift: Yes - incontinent.   Diet: Other - NPO with tube feeds.   Blood glucose monitoring: Yes  : The patient demonstrates incontinence urinating. Urine output is yellow  Skin: redness at GJ tube insertion site. Redness of coccyx (known pressure injury), left elbow abrasion, dryness and flaking of skin everywhere. Peeling of face and ears. Scattered bruising.   Lines/Drains: PIV in left hand infusing  0.45% NaCl @ 100 ml/hr   Labs:Lactic was >4.0 then 3.03 hours later, BNP elevated at 1,370. Chest XR completed.     Electrolyte Replacement: n/a  Ambulation/Assist: assist of 2 and lift - turn and reposition in bed.   Activity: wiggles around in bed often; turning and repositioning with wedges.   Changes to Plan of Care: Chest XR, bolus, decrease nursing level of care.  Wound care orders added. Start PEGJ feedings.   Today's Progress: Tube feedings initiated. Bolus given.  Antibiotics given. Maintaining saturations on room air.     Shaneka Schneider RN on 1/24/2025 at 6:19 PM

## 2025-01-24 NOTE — PLAN OF CARE
Goal Outcome Evaluation:                 Outcome Evaluation: Patient will discharge home with his mother with resumption of home care SN [facility].

## 2025-01-24 NOTE — PROVIDER NOTIFICATION
"Paged on-call hospitalist Domingo:    \"Hi, pt is ordered NPO no exceptions, am I okay to give him meds through his JG tube? Also could you clarify which lumen (G or J) I should use for meds if he can have them?\"  "

## 2025-01-24 NOTE — DISCHARGE INSTRUCTIONS
WOUND CARES  Continue prior to admission wound cares   OR  Continue to use sarah incontinence cleanser and soft dry wipes for each episode         For Home TF:   - Consider 4 bottles Boost High Protein (20 g protein, 250 kcal per bottle, for 1000 kcal, 80 g protein) + 2 bottles Boost Plus (for 720 kcal, 28 g protein)  TOTAL = 1720 kcal, 108 g protein daily (to better meet energy needs without providing excess protein)   - Give 120 mL water every 4 hours (2 syringes 6x per day) for hydration (720 mL total).     OR change over to Boost Plus (14 g protein, 360 kcal per bottle) - 5 bottles/day (for 1800 kcal, 70 g protein)   - Give 120-160 mL water every 4 hours (720-960 mL total)

## 2025-01-24 NOTE — PHARMACY-VANCOMYCIN DOSING SERVICE
"Pharmacy Vancomycin Initial Note  Date of Service 2025  Patient's  1960  64 year old, male    Indication: Sepsis    Current estimated CrCl = Estimated Creatinine Clearance: 68.7 mL/min (A) (based on SCr of 1.18 mg/dL (H)).    Creatinine for last 3 days  2025:  9:10 AM Creatinine 1.27 mg/dL;  6:14 PM Creatinine 1.18 mg/dL    Recent Vancomycin Level(s) for last 3 days  No results found for requested labs within last 3 days.      Vancomycin IV Administrations (past 72 hours)                     vancomycin (VANCOCIN) 1,750 mg in 0.9% NaCl 517.5 mL intermittent infusion (mg) 1,750 mg New Bag 25 1216                    Nephrotoxins and other renal medications (From now, onward)      Start     Dose/Rate Route Frequency Ordered Stop    25 1200  vancomycin (VANCOCIN) 1,500 mg in 0.9% NaCl 265 mL intermittent infusion         1,500 mg  over 180 Minutes Intravenous EVERY 24 HOURS 25 1800  piperacillin-tazobactam (ZOSYN) 4.5 g vial to attach to  mL bag        Note to Pharmacy: For [facility], [facility] and [facility]: For Zosyn-naive patients, use the \"Zosyn initial dose + extended infusion\" order panel.    4.5 g  over 30 Minutes Intravenous EVERY 6 HOURS 25 1337              Contrast Orders - past 72 hours (72h ago, onward)      Start     Dose/Rate Route Frequency Stop    25 1035  iopamidol (ISOVUE-370) solution 83 mL         83 mL Intravenous ONCE 25 1046            [employer] Prediction of Planned Initial Vancomycin Regimen  Regimen: 1500 mg IV every 24 hours.  Start time: 12:00 on 2025  Exposure target: AUC24 (range)400-600 mg/L.hr   AUC24,ss: 515 mg/L.hr  Probability of AUC24 > 400: 77 %  Ctrough,ss: 14.5 mg/L  Probability of Ctrough,ss > 20: 24 %  Probability of nephrotoxicity (Lodise ERNESTO ): 10 %          Plan:  Start vancomycin 1750 mg loading dose then 1500 mg IV q24h.   Vancomycin monitoring method: AUC  Vancomycin therapeutic monitoring goal: " 400-600 mg*h/L  Pharmacy will check vancomycin levels as appropriate in 1-3 Days.    Serum creatinine levels will be ordered every 48 hours.      Liz Rodriguez RPH, PharmD, BCPS

## 2025-01-25 LAB
ANION GAP SERPL CALCULATED.3IONS-SCNC: 10 MMOL/L (ref 7–15)
ANION GAP SERPL CALCULATED.3IONS-SCNC: 11 MMOL/L (ref 7–15)
BASOPHILS # BLD AUTO: 0 10E3/UL (ref 0–0.2)
BASOPHILS NFR BLD AUTO: 1 %
BUN SERPL-MCNC: 10.2 MG/DL (ref 8–23)
BUN SERPL-MCNC: 10.5 MG/DL (ref 8–23)
CALCIUM SERPL-MCNC: 7.5 MG/DL (ref 8.8–10.4)
CALCIUM SERPL-MCNC: 7.5 MG/DL (ref 8.8–10.4)
CHLORIDE SERPL-SCNC: 113 MMOL/L (ref 98–107)
CHLORIDE SERPL-SCNC: 113 MMOL/L (ref 98–107)
CREAT SERPL-MCNC: 0.9 MG/DL (ref 0.67–1.17)
CREAT SERPL-MCNC: 0.94 MG/DL (ref 0.67–1.17)
EGFRCR SERPLBLD CKD-EPI 2021: >90 ML/MIN/1.73M2
EGFRCR SERPLBLD CKD-EPI 2021: >90 ML/MIN/1.73M2
EOSINOPHIL # BLD AUTO: 0 10E3/UL (ref 0–0.7)
EOSINOPHIL NFR BLD AUTO: 0 %
ERYTHROCYTE [DISTWIDTH] IN BLOOD BY AUTOMATED COUNT: 15.4 % (ref 10–15)
GLUCOSE BLDC GLUCOMTR-MCNC: 123 MG/DL (ref 70–99)
GLUCOSE BLDC GLUCOMTR-MCNC: 124 MG/DL (ref 70–99)
GLUCOSE BLDC GLUCOMTR-MCNC: 138 MG/DL (ref 70–99)
GLUCOSE BLDC GLUCOMTR-MCNC: 140 MG/DL (ref 70–99)
GLUCOSE BLDC GLUCOMTR-MCNC: 152 MG/DL (ref 70–99)
GLUCOSE BLDC GLUCOMTR-MCNC: 99 MG/DL (ref 70–99)
GLUCOSE SERPL-MCNC: 119 MG/DL (ref 70–99)
GLUCOSE SERPL-MCNC: 156 MG/DL (ref 70–99)
HCO3 SERPL-SCNC: 26 MMOL/L (ref 22–29)
HCO3 SERPL-SCNC: 27 MMOL/L (ref 22–29)
HCT VFR BLD AUTO: 35 % (ref 40–53)
HGB BLD-MCNC: 10.5 G/DL (ref 13.3–17.7)
IMM GRANULOCYTES # BLD: 0 10E3/UL
IMM GRANULOCYTES NFR BLD: 0 %
LACTATE SERPL-SCNC: 2.1 MMOL/L (ref 0.7–2)
LYMPHOCYTES # BLD AUTO: 0.9 10E3/UL (ref 0.8–5.3)
LYMPHOCYTES NFR BLD AUTO: 12 %
MAGNESIUM SERPL-MCNC: 2.2 MG/DL (ref 1.7–2.3)
MCH RBC QN AUTO: 27.2 PG (ref 26.5–33)
MCHC RBC AUTO-ENTMCNC: 30 G/DL (ref 31.5–36.5)
MCV RBC AUTO: 91 FL (ref 78–100)
MONOCYTES # BLD AUTO: 0.4 10E3/UL (ref 0–1.3)
MONOCYTES NFR BLD AUTO: 6 %
NEUTROPHILS # BLD AUTO: 5.7 10E3/UL (ref 1.6–8.3)
NEUTROPHILS NFR BLD AUTO: 81 %
NRBC # BLD AUTO: 0 10E3/UL
NRBC BLD AUTO-RTO: 0 /100
PHOSPHATE SERPL-MCNC: 1.7 MG/DL (ref 2.5–4.5)
PLATELET # BLD AUTO: 180 10E3/UL (ref 150–450)
POTASSIUM SERPL-SCNC: 3.2 MMOL/L (ref 3.4–5.3)
POTASSIUM SERPL-SCNC: 3.5 MMOL/L (ref 3.4–5.3)
RBC # BLD AUTO: 3.86 10E6/UL (ref 4.4–5.9)
SODIUM SERPL-SCNC: 150 MMOL/L (ref 135–145)
SODIUM SERPL-SCNC: 150 MMOL/L (ref 135–145)
WBC # BLD AUTO: 7.1 10E3/UL (ref 4–11)

## 2025-01-25 PROCEDURE — 83735 ASSAY OF MAGNESIUM: CPT | Performed by: HOSPITALIST

## 2025-01-25 PROCEDURE — 80048 BASIC METABOLIC PNL TOTAL CA: CPT | Mod: 91 | Performed by: HOSPITALIST

## 2025-01-25 PROCEDURE — 99233 SBSQ HOSP IP/OBS HIGH 50: CPT | Performed by: HOSPITALIST

## 2025-01-25 PROCEDURE — 250N000009 HC RX 250: Performed by: HOSPITALIST

## 2025-01-25 PROCEDURE — 36415 COLL VENOUS BLD VENIPUNCTURE: CPT | Performed by: HOSPITALIST

## 2025-01-25 PROCEDURE — 82565 ASSAY OF CREATININE: CPT | Performed by: HOSPITALIST

## 2025-01-25 PROCEDURE — 120N000001 HC R&B MED SURG/OB

## 2025-01-25 PROCEDURE — 94640 AIRWAY INHALATION TREATMENT: CPT | Mod: 76

## 2025-01-25 PROCEDURE — 999N000157 HC STATISTIC RCP TIME EA 10 MIN

## 2025-01-25 PROCEDURE — 84100 ASSAY OF PHOSPHORUS: CPT | Performed by: HOSPITALIST

## 2025-01-25 PROCEDURE — 85025 COMPLETE CBC W/AUTO DIFF WBC: CPT | Performed by: HOSPITALIST

## 2025-01-25 PROCEDURE — 85004 AUTOMATED DIFF WBC COUNT: CPT | Performed by: HOSPITALIST

## 2025-01-25 PROCEDURE — 83605 ASSAY OF LACTIC ACID: CPT | Performed by: HOSPITALIST

## 2025-01-25 PROCEDURE — 94640 AIRWAY INHALATION TREATMENT: CPT

## 2025-01-25 PROCEDURE — 250N000013 HC RX MED GY IP 250 OP 250 PS 637: Performed by: HOSPITALIST

## 2025-01-25 PROCEDURE — 250N000011 HC RX IP 250 OP 636: Performed by: HOSPITALIST

## 2025-01-25 PROCEDURE — 258N000002 HC RX IP 258 OP 250: Performed by: HOSPITALIST

## 2025-01-25 RX ORDER — HYDROCORTISONE SODIUM SUCCINATE 100 MG/2ML
25 INJECTION INTRAMUSCULAR; INTRAVENOUS EVERY 8 HOURS
Status: COMPLETED | OUTPATIENT
Start: 2025-01-25 | End: 2025-01-26

## 2025-01-25 RX ORDER — POTASSIUM CHLORIDE 1.5 G/1.58G
40 POWDER, FOR SOLUTION ORAL ONCE
Status: COMPLETED | OUTPATIENT
Start: 2025-01-25 | End: 2025-01-25

## 2025-01-25 RX ADMIN — ALBUTEROL SULFATE 2.5 MG: 2.5 SOLUTION RESPIRATORY (INHALATION) at 15:12

## 2025-01-25 RX ADMIN — ALBUTEROL SULFATE 2.5 MG: 2.5 SOLUTION RESPIRATORY (INHALATION) at 11:10

## 2025-01-25 RX ADMIN — PIPERACILLIN AND TAZOBACTAM 4.5 G: 4; .5 INJECTION, POWDER, FOR SOLUTION INTRAVENOUS at 14:50

## 2025-01-25 RX ADMIN — MULTIVITAMIN TABLET 1 TABLET: TABLET at 09:01

## 2025-01-25 RX ADMIN — LEVOTHYROXINE SODIUM 88 MCG: 88 TABLET ORAL at 10:42

## 2025-01-25 RX ADMIN — SODIUM CHLORIDE: 4.5 INJECTION, SOLUTION INTRAVENOUS at 11:58

## 2025-01-25 RX ADMIN — Medication 40 MG: at 20:57

## 2025-01-25 RX ADMIN — PIPERACILLIN AND TAZOBACTAM 4.5 G: 4; .5 INJECTION, POWDER, FOR SOLUTION INTRAVENOUS at 09:11

## 2025-01-25 RX ADMIN — METOCLOPRAMIDE HYDROCHLORIDE 10 MG: 5 SOLUTION ORAL at 17:22

## 2025-01-25 RX ADMIN — CYANOCOBALAMIN TAB 1000 MCG 1000 MCG: 1000 TAB at 09:01

## 2025-01-25 RX ADMIN — CARBAMAZEPINE 150 MG: 100 SUSPENSION ORAL at 23:41

## 2025-01-25 RX ADMIN — ALBUTEROL SULFATE 2.5 MG: 2.5 SOLUTION RESPIRATORY (INHALATION) at 19:44

## 2025-01-25 RX ADMIN — HYDROCORTISONE SODIUM SUCCINATE 25 MG: 100 INJECTION, POWDER, FOR SOLUTION INTRAMUSCULAR; INTRAVENOUS at 17:23

## 2025-01-25 RX ADMIN — ALBUTEROL SULFATE 2.5 MG: 2.5 SOLUTION RESPIRATORY (INHALATION) at 07:02

## 2025-01-25 RX ADMIN — HYDROCORTISONE SODIUM SUCCINATE 50 MG: 100 INJECTION, POWDER, FOR SOLUTION INTRAMUSCULAR; INTRAVENOUS at 01:12

## 2025-01-25 RX ADMIN — PIPERACILLIN AND TAZOBACTAM 4.5 G: 4; .5 INJECTION, POWDER, FOR SOLUTION INTRAVENOUS at 20:57

## 2025-01-25 RX ADMIN — BRIVARACETAM 100 MG: 10 SOLUTION ORAL at 09:00

## 2025-01-25 RX ADMIN — BRIVARACETAM 100 MG: 10 SOLUTION ORAL at 20:57

## 2025-01-25 RX ADMIN — HYDROCORTISONE SODIUM SUCCINATE 50 MG: 100 INJECTION, POWDER, FOR SOLUTION INTRAMUSCULAR; INTRAVENOUS at 09:10

## 2025-01-25 RX ADMIN — METOCLOPRAMIDE HYDROCHLORIDE 10 MG: 5 SOLUTION ORAL at 10:44

## 2025-01-25 RX ADMIN — GLYCOPYRROLATE 2 MG: 1 TABLET ORAL at 09:01

## 2025-01-25 RX ADMIN — INSULIN ASPART 1 UNITS: 100 INJECTION, SOLUTION INTRAVENOUS; SUBCUTANEOUS at 05:47

## 2025-01-25 RX ADMIN — ACETYLCYSTEINE 2 ML: 200 SOLUTION ORAL; RESPIRATORY (INHALATION) at 11:10

## 2025-01-25 RX ADMIN — METOCLOPRAMIDE HYDROCHLORIDE 10 MG: 5 SOLUTION ORAL at 06:38

## 2025-01-25 RX ADMIN — METOCLOPRAMIDE HYDROCHLORIDE 10 MG: 5 SOLUTION ORAL at 21:01

## 2025-01-25 RX ADMIN — ACETYLCYSTEINE 2 ML: 200 SOLUTION ORAL; RESPIRATORY (INHALATION) at 15:12

## 2025-01-25 RX ADMIN — CARBAMAZEPINE 100 MG: 100 SUSPENSION ORAL at 17:24

## 2025-01-25 RX ADMIN — GLYCOPYRROLATE 2 MG: 1 TABLET ORAL at 21:01

## 2025-01-25 RX ADMIN — Medication 40 MG: at 09:10

## 2025-01-25 RX ADMIN — PIPERACILLIN AND TAZOBACTAM 4.5 G: 4; .5 INJECTION, POWDER, FOR SOLUTION INTRAVENOUS at 01:13

## 2025-01-25 RX ADMIN — CARBAMAZEPINE 150 MG: 100 SUSPENSION ORAL at 01:12

## 2025-01-25 RX ADMIN — INSULIN ASPART 1 UNITS: 100 INJECTION, SOLUTION INTRAVENOUS; SUBCUTANEOUS at 14:49

## 2025-01-25 RX ADMIN — CARBAMAZEPINE 150 MG: 100 SUSPENSION ORAL at 11:58

## 2025-01-25 RX ADMIN — CARBAMAZEPINE 150 MG: 100 SUSPENSION ORAL at 05:38

## 2025-01-25 RX ADMIN — ACETYLCYSTEINE 2 ML: 200 SOLUTION ORAL; RESPIRATORY (INHALATION) at 19:44

## 2025-01-25 RX ADMIN — POTASSIUM CHLORIDE 40 MEQ: 1.5 POWDER, FOR SOLUTION ORAL at 23:41

## 2025-01-25 RX ADMIN — ACETYLCYSTEINE 2 ML: 200 SOLUTION ORAL; RESPIRATORY (INHALATION) at 07:02

## 2025-01-25 ASSESSMENT — ACTIVITIES OF DAILY LIVING (ADL)
ADLS_ACUITY_SCORE: 83
ADLS_ACUITY_SCORE: 83
ADLS_ACUITY_SCORE: 79
ADLS_ACUITY_SCORE: 79
ADLS_ACUITY_SCORE: 83
ADLS_ACUITY_SCORE: 79
ADLS_ACUITY_SCORE: 83
ADLS_ACUITY_SCORE: 83
ADLS_ACUITY_SCORE: 79
ADLS_ACUITY_SCORE: 83
ADLS_ACUITY_SCORE: 79
ADLS_ACUITY_SCORE: 79
ADLS_ACUITY_SCORE: 83
ADLS_ACUITY_SCORE: 83
ADLS_ACUITY_SCORE: 79
ADLS_ACUITY_SCORE: 83
ADLS_ACUITY_SCORE: 79
ADLS_ACUITY_SCORE: 83
ADLS_ACUITY_SCORE: 79
ADLS_ACUITY_SCORE: 79
ADLS_ACUITY_SCORE: 83

## 2025-01-25 NOTE — PLAN OF CARE
Goal Outcome Evaluation:  [id number]       Pt alert, garbled speech, incomprehensible sound, able to understand simple commands. AVSS on room air. Denies pain but able to make sound or cries out when feels discomfort/pain. On NPO, tolerating tube feeding. With 1 PIV on L dorsal hand X SL. A1-2 turn and repo, able to help with repositioning. Incontinent for both bowel and bladder, 2X BM this shift, voiding adequately. Continue to monitor.

## 2025-01-25 NOTE — PROVIDER NOTIFICATION
"Writer paged on-call Provider Ergando for scheduled Lactic Acid and result was 3.5, vitals are stable. Provided responded, \"No interventions needed. Lactic acid as scheduled in the AM.\"  "
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
## 2025-01-25 NOTE — PLAN OF CARE
Problem: Comorbidity Management  Goal: Maintenance of Behavioral Health Symptom Control  Outcome: Progressing  Intervention: Maintain Behavioral Health Symptom Control  Recent Flowsheet Documentation  Taken 1/25/2025 0800 by Shaneka Schneider RN  Medication Review/Management: medications reviewed     Problem: Pneumonia  Goal: Fluid Balance  Outcome: Progressing  Intervention: Monitor and Manage Fluid Balance  Recent Flowsheet Documentation  Taken 1/25/2025 0800 by Shaneka Schneider RN  Fluid/Electrolyte Management: fluids provided  Goal: Resolution of Infection Signs and Symptoms  Outcome: Progressing  Intervention: Prevent Infection Progression  Recent Flowsheet Documentation  Taken 1/25/2025 0800 by Shaneka Schneider RN  Isolation Precautions: contact precautions maintained     Problem: Skin Injury Risk Increased  Goal: Skin Health and Integrity  Outcome: Progressing  Intervention: Plan: Nurse Driven Intervention: Moisture Management  Recent Flowsheet Documentation  Taken 1/25/2025 0800 by Shaneka Schneider RN  Moisture Interventions: Incontinence pad  Bathing/Skin Care:   dressed/undressed   electrode patches/site rotation  Intervention: Plan: Nurse Driven Intervention: Friction and Shear  Recent Flowsheet Documentation  Taken 1/25/2025 0800 by Shaneka Schneider RN  Friction/Shear Interventions: Silicone foam sacral dressing  Intervention: Optimize Skin Protection  Recent Flowsheet Documentation  Taken 1/25/2025 0800 by Shaneka Schneider RN  Pressure Reduction Techniques: heels elevated off bed  Pressure Reduction Devices: positioning supports utilized  Skin Protection:   adhesive use limited   incontinence pads utilized  Activity Management: activity adjusted per tolerance  Head of Bed (HOB) Positioning: HOB at 45 degrees   Goal Outcome Evaluation:    Orientations: Alert and HECTOR orientation. Hx of TBI. Has guardians  Tele: discontinued tele  Pain:denies but cries out in pain sometimes with repositioning - patient then gives thumbs up and denies  pain.   Vitals:/61 (BP Location: Right arm, Cuff Size: Adult Regular)   Pulse 63   Temp 98.1  F (36.7  C) (Axillary)   Resp 16   SpO2 100%     /67 (BP Location: Left arm)   Pulse 91   Temp 98  F (36.7  C) (Axillary)   Resp 19   SpO2 98%     Respiratory: The patient demonstrates: congested cough decreased breath sounds throughout. The patient is maintaining saturations >92% on room air at rest but desaturates to mid 80% when head laid back.  Cardiac:No signs of bleeding. Edema: none  Radial Pulses 2+. Pedial Pulses 2+.  Neuros:Hx of TBI, right spastic hemiplegia, aphasia, and seizure disorder.   GI: guarded, flat, slightly tender near GJ tube with palpation and Bowel sounds normoactive. BM this shift: Yes, liquid + incontient smear.   Diet: Other - NPO with tube feeds.   Blood glucose monitoring: Yes  : The patient demonstrates incontinence urinating. Urine output is yellow  Skin: redness at GJ tube insertion site. Redness of coccyx (known pressure injury), left elbow abrasion, dryness and flaking of skin everywhere. Peeling of face and ears. Scattered bruising.   Lines/Drains: PIV in left hand infusing 0.45% NaCl @ 100 ml/hr   Labs:Lactic was >4.0 -> 3.03 -> 2.1  Electrolyte Replacement: n/a  Ambulation/Assist: assist of 2 and lift - turn and reposition in bed.   Activity: wiggles around in bed often; turning and repositioning with wedges Q2H.   Changes to Plan of Care: discontinue IV fluids.   Today's Progress: Tube feedings continued. Antibiotics given.     Shaneka Schneider RN on 1/25/2025 at 6:10 PM

## 2025-01-26 LAB
ANION GAP SERPL CALCULATED.3IONS-SCNC: 10 MMOL/L (ref 7–15)
BUN SERPL-MCNC: 10.8 MG/DL (ref 8–23)
CALCIUM SERPL-MCNC: 8 MG/DL (ref 8.8–10.4)
CHLORIDE SERPL-SCNC: 117 MMOL/L (ref 98–107)
CREAT SERPL-MCNC: 0.97 MG/DL (ref 0.67–1.17)
EGFRCR SERPLBLD CKD-EPI 2021: 88 ML/MIN/1.73M2
GLUCOSE BLDC GLUCOMTR-MCNC: 104 MG/DL (ref 70–99)
GLUCOSE BLDC GLUCOMTR-MCNC: 117 MG/DL (ref 70–99)
GLUCOSE BLDC GLUCOMTR-MCNC: 121 MG/DL (ref 70–99)
GLUCOSE BLDC GLUCOMTR-MCNC: 132 MG/DL (ref 70–99)
GLUCOSE BLDC GLUCOMTR-MCNC: 153 MG/DL (ref 70–99)
GLUCOSE BLDC GLUCOMTR-MCNC: 93 MG/DL (ref 70–99)
GLUCOSE SERPL-MCNC: 127 MG/DL (ref 70–99)
HCO3 SERPL-SCNC: 28 MMOL/L (ref 22–29)
HCT VFR BLD AUTO: 36.4 % (ref 40–53)
HGB BLD-MCNC: 11.1 G/DL (ref 13.3–17.7)
MAGNESIUM SERPL-MCNC: 2.5 MG/DL (ref 1.7–2.3)
PHOSPHATE SERPL-MCNC: 1.4 MG/DL (ref 2.5–4.5)
POTASSIUM SERPL-SCNC: 3.6 MMOL/L (ref 3.4–5.3)
SODIUM SERPL-SCNC: 150 MMOL/L (ref 135–145)
SODIUM SERPL-SCNC: 153 MMOL/L (ref 135–145)
SODIUM SERPL-SCNC: 155 MMOL/L (ref 135–145)

## 2025-01-26 PROCEDURE — 84100 ASSAY OF PHOSPHORUS: CPT | Performed by: HOSPITALIST

## 2025-01-26 PROCEDURE — 250N000009 HC RX 250: Performed by: HOSPITALIST

## 2025-01-26 PROCEDURE — 250N000011 HC RX IP 250 OP 636: Performed by: HOSPITALIST

## 2025-01-26 PROCEDURE — 99232 SBSQ HOSP IP/OBS MODERATE 35: CPT | Performed by: HOSPITALIST

## 2025-01-26 PROCEDURE — 94640 AIRWAY INHALATION TREATMENT: CPT

## 2025-01-26 PROCEDURE — 36415 COLL VENOUS BLD VENIPUNCTURE: CPT | Performed by: HOSPITALIST

## 2025-01-26 PROCEDURE — 250N000013 HC RX MED GY IP 250 OP 250 PS 637: Performed by: HOSPITALIST

## 2025-01-26 PROCEDURE — 84295 ASSAY OF SERUM SODIUM: CPT | Mod: XU | Performed by: HOSPITALIST

## 2025-01-26 PROCEDURE — 999N000128 HC STATISTIC PERIPHERAL IV START W/O US GUIDANCE

## 2025-01-26 PROCEDURE — 85014 HEMATOCRIT: CPT | Performed by: HOSPITALIST

## 2025-01-26 PROCEDURE — 82565 ASSAY OF CREATININE: CPT | Performed by: HOSPITALIST

## 2025-01-26 PROCEDURE — 85018 HEMOGLOBIN: CPT | Performed by: HOSPITALIST

## 2025-01-26 PROCEDURE — 999N000157 HC STATISTIC RCP TIME EA 10 MIN

## 2025-01-26 PROCEDURE — 258N000003 HC RX IP 258 OP 636: Performed by: HOSPITALIST

## 2025-01-26 PROCEDURE — 94640 AIRWAY INHALATION TREATMENT: CPT | Mod: 76

## 2025-01-26 PROCEDURE — 120N000001 HC R&B MED SURG/OB

## 2025-01-26 PROCEDURE — 80048 BASIC METABOLIC PNL TOTAL CA: CPT | Performed by: HOSPITALIST

## 2025-01-26 PROCEDURE — 83735 ASSAY OF MAGNESIUM: CPT | Performed by: HOSPITALIST

## 2025-01-26 RX ORDER — DEXTROSE MONOHYDRATE 50 MG/ML
INJECTION, SOLUTION INTRAVENOUS CONTINUOUS
Status: ACTIVE | OUTPATIENT
Start: 2025-01-26 | End: 2025-01-26

## 2025-01-26 RX ORDER — AMOXICILLIN AND CLAVULANATE POTASSIUM 400; 57 MG/5ML; MG/5ML
875 POWDER, FOR SUSPENSION ORAL 2 TIMES DAILY
Status: DISCONTINUED | OUTPATIENT
Start: 2025-01-26 | End: 2025-01-28 | Stop reason: HOSPADM

## 2025-01-26 RX ORDER — DEXTROSE MONOHYDRATE 50 MG/ML
INJECTION, SOLUTION INTRAVENOUS CONTINUOUS
Status: DISCONTINUED | OUTPATIENT
Start: 2025-01-26 | End: 2025-01-26

## 2025-01-26 RX ADMIN — PIPERACILLIN AND TAZOBACTAM 4.5 G: 4; .5 INJECTION, POWDER, FOR SOLUTION INTRAVENOUS at 02:18

## 2025-01-26 RX ADMIN — ACETYLCYSTEINE 2 ML: 200 SOLUTION ORAL; RESPIRATORY (INHALATION) at 14:28

## 2025-01-26 RX ADMIN — CARBAMAZEPINE 150 MG: 100 SUSPENSION ORAL at 11:28

## 2025-01-26 RX ADMIN — ALBUTEROL SULFATE 2.5 MG: 2.5 SOLUTION RESPIRATORY (INHALATION) at 07:54

## 2025-01-26 RX ADMIN — POTASSIUM & SODIUM PHOSPHATES POWDER PACK 280-160-250 MG 2 PACKET: 280-160-250 PACK at 17:47

## 2025-01-26 RX ADMIN — METOCLOPRAMIDE HYDROCHLORIDE 10 MG: 5 SOLUTION ORAL at 11:26

## 2025-01-26 RX ADMIN — ALBUTEROL SULFATE 2.5 MG: 2.5 SOLUTION RESPIRATORY (INHALATION) at 14:27

## 2025-01-26 RX ADMIN — ALBUTEROL SULFATE 2.5 MG: 2.5 SOLUTION RESPIRATORY (INHALATION) at 20:57

## 2025-01-26 RX ADMIN — METOCLOPRAMIDE HYDROCHLORIDE 10 MG: 5 SOLUTION ORAL at 06:14

## 2025-01-26 RX ADMIN — POTASSIUM & SODIUM PHOSPHATES POWDER PACK 280-160-250 MG 2 PACKET: 280-160-250 PACK at 14:46

## 2025-01-26 RX ADMIN — GLYCOPYRROLATE 2 MG: 1 TABLET ORAL at 20:57

## 2025-01-26 RX ADMIN — CARBAMAZEPINE 100 MG: 100 SUSPENSION ORAL at 17:47

## 2025-01-26 RX ADMIN — CARBAMAZEPINE 150 MG: 100 SUSPENSION ORAL at 06:14

## 2025-01-26 RX ADMIN — CARBAMAZEPINE 150 MG: 100 SUSPENSION ORAL at 23:46

## 2025-01-26 RX ADMIN — ACETYLCYSTEINE 2 ML: 200 SOLUTION ORAL; RESPIRATORY (INHALATION) at 20:56

## 2025-01-26 RX ADMIN — INSULIN ASPART 1 UNITS: 100 INJECTION, SOLUTION INTRAVENOUS; SUBCUTANEOUS at 14:44

## 2025-01-26 RX ADMIN — Medication 40 MG: at 07:46

## 2025-01-26 RX ADMIN — MULTIVITAMIN TABLET 1 TABLET: TABLET at 07:51

## 2025-01-26 RX ADMIN — METOCLOPRAMIDE HYDROCHLORIDE 10 MG: 5 SOLUTION ORAL at 21:32

## 2025-01-26 RX ADMIN — AMOXICILLIN AND CLAVULANATE POTASSIUM 875 MG: 400; 57 POWDER, FOR SUSPENSION ORAL at 21:31

## 2025-01-26 RX ADMIN — HYDROCORTISONE SODIUM SUCCINATE 25 MG: 100 INJECTION, POWDER, FOR SOLUTION INTRAMUSCULAR; INTRAVENOUS at 02:18

## 2025-01-26 RX ADMIN — GLYCOPYRROLATE 2 MG: 1 TABLET ORAL at 07:51

## 2025-01-26 RX ADMIN — CYANOCOBALAMIN TAB 1000 MCG 1000 MCG: 1000 TAB at 07:52

## 2025-01-26 RX ADMIN — Medication 40 MG: at 20:56

## 2025-01-26 RX ADMIN — BRIVARACETAM 100 MG: 10 SOLUTION ORAL at 07:51

## 2025-01-26 RX ADMIN — ACETYLCYSTEINE 2 ML: 200 SOLUTION ORAL; RESPIRATORY (INHALATION) at 07:54

## 2025-01-26 RX ADMIN — PIPERACILLIN AND TAZOBACTAM 4.5 G: 4; .5 INJECTION, POWDER, FOR SOLUTION INTRAVENOUS at 07:46

## 2025-01-26 RX ADMIN — BRIVARACETAM 100 MG: 10 SOLUTION ORAL at 20:56

## 2025-01-26 RX ADMIN — DEXTROSE MONOHYDRATE: 50 INJECTION, SOLUTION INTRAVENOUS at 07:38

## 2025-01-26 RX ADMIN — LEVOTHYROXINE SODIUM 88 MCG: 88 TABLET ORAL at 08:43

## 2025-01-26 RX ADMIN — AMOXICILLIN AND CLAVULANATE POTASSIUM 875 MG: 400; 57 POWDER, FOR SUSPENSION ORAL at 11:27

## 2025-01-26 RX ADMIN — ACETYLCYSTEINE 2 ML: 200 SOLUTION ORAL; RESPIRATORY (INHALATION) at 10:39

## 2025-01-26 RX ADMIN — METOCLOPRAMIDE HYDROCHLORIDE 10 MG: 5 SOLUTION ORAL at 16:21

## 2025-01-26 RX ADMIN — HYDROCORTISONE SODIUM SUCCINATE 25 MG: 100 INJECTION, POWDER, FOR SOLUTION INTRAMUSCULAR; INTRAVENOUS at 09:31

## 2025-01-26 RX ADMIN — POTASSIUM & SODIUM PHOSPHATES POWDER PACK 280-160-250 MG 2 PACKET: 280-160-250 PACK at 21:31

## 2025-01-26 RX ADMIN — ALBUTEROL SULFATE 2.5 MG: 2.5 SOLUTION RESPIRATORY (INHALATION) at 10:39

## 2025-01-26 ASSESSMENT — ACTIVITIES OF DAILY LIVING (ADL)
ADLS_ACUITY_SCORE: 79
ADLS_ACUITY_SCORE: 83
ADLS_ACUITY_SCORE: 79
ADLS_ACUITY_SCORE: 78
ADLS_ACUITY_SCORE: 83
ADLS_ACUITY_SCORE: 79
ADLS_ACUITY_SCORE: 78
ADLS_ACUITY_SCORE: 79
ADLS_ACUITY_SCORE: 78
ADLS_ACUITY_SCORE: 78
ADLS_ACUITY_SCORE: 79
ADLS_ACUITY_SCORE: 78
ADLS_ACUITY_SCORE: 83
ADLS_ACUITY_SCORE: 79
ADLS_ACUITY_SCORE: 83
ADLS_ACUITY_SCORE: 78
ADLS_ACUITY_SCORE: 83
ADLS_ACUITY_SCORE: 78
ADLS_ACUITY_SCORE: 79
ADLS_ACUITY_SCORE: 83
ADLS_ACUITY_SCORE: 83

## 2025-01-26 NOTE — PLAN OF CARE
Goal Outcome Evaluation:  [id number]       Pt is alert, unable to assess orientation due to TBI, nonverbal at baseline, making incomprehensible sound/garbled speech. AVSS on room air, not complaining of pain, able to give thumbs up when being asked. Pt able to understand simple command. On NPO, tolerating tube feeds. Pt's head maintained on 30 degrees and up. Incontinent for both bowel and bladder, 2X soft stools this shift. A1-2 with turn and repo, pt able to help with repositioning. 1 PIV on L dorsal hand X SL. Replaced serum K this shift, started on serum K replacement protocol. Continue plan of care. Continue to monitor.
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
## 2025-01-26 NOTE — PLAN OF CARE
Problem: Comorbidity Management  Goal: Maintenance of Behavioral Health Symptom Control  Outcome: Progressing  Intervention: Maintain Behavioral Health Symptom Control  Recent Flowsheet Documentation  Taken 1/26/2025 0800 by Shaneka Schneider RN  Medication Review/Management: medications reviewed     Problem: Pneumonia  Goal: Fluid Balance  Outcome: Progressing  Intervention: Monitor and Manage Fluid Balance  Recent Flowsheet Documentation  Taken 1/26/2025 0800 by Shaneka Schneider RN  Fluid/Electrolyte Management: fluids provided  Goal: Resolution of Infection Signs and Symptoms  Outcome: Progressing  Intervention: Prevent Infection Progression  Recent Flowsheet Documentation  Taken 1/26/2025 0800 by Shaneka Schneider RN  Isolation Precautions: contact precautions maintained     Problem: Skin Injury Risk Increased  Goal: Skin Health and Integrity  Outcome: Progressing  Intervention: Plan: Nurse Driven Intervention: Moisture Management  Recent Flowsheet Documentation  Taken 1/26/2025 0800 by Shaneka Schneider RN  Moisture Interventions: Incontinence pad  Intervention: Plan: Nurse Driven Intervention: Friction and Shear  Recent Flowsheet Documentation  Taken 1/26/2025 0800 by Shaneka Schneider RN  Friction/Shear Interventions: Silicone foam sacral dressing  Intervention: Optimize Skin Protection  Recent Flowsheet Documentation  Taken 1/26/2025 0800 by Shaneka Schneider RN  Pressure Reduction Techniques: heels elevated off bed  Pressure Reduction Devices: positioning supports utilized  Skin Protection:   adhesive use limited   incontinence pads utilized  Activity Management: activity adjusted per tolerance  Head of Bed (HOB) Positioning: HOB at 45 degrees   Goal Outcome Evaluation:    Orientations: Alert and HECTOR orientation. Hx of TBI. Has guardians  Tele: discontinued tele  Pain:denies but cries out in pain sometimes with repositioning - patient then gives thumbs up and denies pain.   Vitals:  /70 (BP Location: Left arm, Cuff Size: Adult  "Regular)   Pulse 74   Temp 97.9  F (36.6  C) (Axillary)   Resp 18   Ht 1.778 m (5' 10\")   Wt 74.4 kg (164 lb 0.4 oz)   SpO2 98%   BMI 23.53 kg/m       Respiratory: The patient demonstrates: congested cough decreased breath sounds throughout. The patient is maintaining saturations >92% on room air at rest but desaturates to mid 80% when head laid back.  Cardiac:No signs of bleeding. Edema: none  Radial Pulses 2+. Pedial Pulses 2+.  Neuros:Hx of TBI, right spastic hemiplegia, aphasia, and seizure disorder.   GI: guarded, flat, slightly tender near GJ tube with palpation and Bowel sounds normoactive. BM this shift: Yes, liquid + incontient X 5+   Diet: Other - NPO with tube feeds.   Blood glucose monitoring: Yes  : The patient demonstrates incontinence urinating. Urine output is yellow  Skin: redness at GJ tube insertion site. Redness of coccyx (known pressure injury), left elbow abrasion, dryness and flaking of skin everywhere. Peeling of face and ears. Scattered bruising.   Lines/Drains: PIV in left hand infusing 0.45% NaCl @ 100 ml/hr   Labs:Sodium up at 155 -> D5W infusion initiated today. Magnesium high, Phosphorus Low, MD aware.   Electrolyte Replacement: Phosphorus replacement added.   Ambulation/Assist: assist of 2 and lift - turn and reposition in bed.   Activity: wiggles around in bed often; turning and repositioning with wedges Q2H.   Changes to Plan of Care: D5W @ 100 ml/hr initiated for 5 hours.  Free water flushes increased from 120 Q4H to 200 Q4H  Today's Progress: Tube feedings continued. Antibiotics given. Sodium came back down. Will continue to monitor.       Shaneka Schneider RN on 1/26/2025 at 6:07 PM    "

## 2025-01-27 LAB
ANION GAP SERPL CALCULATED.3IONS-SCNC: 8 MMOL/L (ref 7–15)
BUN SERPL-MCNC: 11.4 MG/DL (ref 8–23)
CALCIUM SERPL-MCNC: 7.9 MG/DL (ref 8.8–10.4)
CHLORIDE SERPL-SCNC: 112 MMOL/L (ref 98–107)
CREAT SERPL-MCNC: 0.85 MG/DL (ref 0.67–1.17)
EGFRCR SERPLBLD CKD-EPI 2021: >90 ML/MIN/1.73M2
GLUCOSE BLDC GLUCOMTR-MCNC: 103 MG/DL (ref 70–99)
GLUCOSE BLDC GLUCOMTR-MCNC: 116 MG/DL (ref 70–99)
GLUCOSE BLDC GLUCOMTR-MCNC: 135 MG/DL (ref 70–99)
GLUCOSE BLDC GLUCOMTR-MCNC: 136 MG/DL (ref 70–99)
GLUCOSE BLDC GLUCOMTR-MCNC: 95 MG/DL (ref 70–99)
GLUCOSE BLDC GLUCOMTR-MCNC: 95 MG/DL (ref 70–99)
GLUCOSE SERPL-MCNC: 118 MG/DL (ref 70–99)
HCO3 SERPL-SCNC: 30 MMOL/L (ref 22–29)
HOLD SPECIMEN: NORMAL
MAGNESIUM SERPL-MCNC: 2.3 MG/DL (ref 1.7–2.3)
PHOSPHATE SERPL-MCNC: 2.5 MG/DL (ref 2.5–4.5)
POTASSIUM SERPL-SCNC: 3.2 MMOL/L (ref 3.4–5.3)
POTASSIUM SERPL-SCNC: 3.8 MMOL/L (ref 3.4–5.3)
SODIUM SERPL-SCNC: 146 MMOL/L (ref 135–145)
SODIUM SERPL-SCNC: 147 MMOL/L (ref 135–145)
SODIUM SERPL-SCNC: 149 MMOL/L (ref 135–145)
SODIUM SERPL-SCNC: 150 MMOL/L (ref 135–145)

## 2025-01-27 PROCEDURE — 250N000013 HC RX MED GY IP 250 OP 250 PS 637: Performed by: HOSPITALIST

## 2025-01-27 PROCEDURE — 84295 ASSAY OF SERUM SODIUM: CPT | Mod: XU | Performed by: HOSPITALIST

## 2025-01-27 PROCEDURE — 120N000001 HC R&B MED SURG/OB

## 2025-01-27 PROCEDURE — 80048 BASIC METABOLIC PNL TOTAL CA: CPT | Performed by: HOSPITALIST

## 2025-01-27 PROCEDURE — 94640 AIRWAY INHALATION TREATMENT: CPT | Mod: 76

## 2025-01-27 PROCEDURE — 250N000009 HC RX 250: Performed by: HOSPITALIST

## 2025-01-27 PROCEDURE — 999N000157 HC STATISTIC RCP TIME EA 10 MIN

## 2025-01-27 PROCEDURE — 258N000003 HC RX IP 258 OP 636: Performed by: HOSPITALIST

## 2025-01-27 PROCEDURE — 84132 ASSAY OF SERUM POTASSIUM: CPT | Performed by: HOSPITALIST

## 2025-01-27 PROCEDURE — 99233 SBSQ HOSP IP/OBS HIGH 50: CPT | Performed by: HOSPITALIST

## 2025-01-27 PROCEDURE — 84100 ASSAY OF PHOSPHORUS: CPT | Performed by: HOSPITALIST

## 2025-01-27 PROCEDURE — 36415 COLL VENOUS BLD VENIPUNCTURE: CPT | Performed by: HOSPITALIST

## 2025-01-27 PROCEDURE — 83735 ASSAY OF MAGNESIUM: CPT | Performed by: HOSPITALIST

## 2025-01-27 PROCEDURE — 94640 AIRWAY INHALATION TREATMENT: CPT

## 2025-01-27 RX ORDER — DEXTROSE MONOHYDRATE 50 MG/ML
INJECTION, SOLUTION INTRAVENOUS CONTINUOUS
Status: ACTIVE | OUTPATIENT
Start: 2025-01-27 | End: 2025-01-27

## 2025-01-27 RX ORDER — POTASSIUM CHLORIDE 1.5 G/1.58G
40 POWDER, FOR SOLUTION ORAL ONCE
Status: COMPLETED | OUTPATIENT
Start: 2025-01-27 | End: 2025-01-27

## 2025-01-27 RX ADMIN — CARBAMAZEPINE 150 MG: 100 SUSPENSION ORAL at 15:27

## 2025-01-27 RX ADMIN — CARBAMAZEPINE 150 MG: 100 SUSPENSION ORAL at 05:43

## 2025-01-27 RX ADMIN — GLYCOPYRROLATE 2 MG: 1 TABLET ORAL at 20:00

## 2025-01-27 RX ADMIN — ALBUTEROL SULFATE 2.5 MG: 2.5 SOLUTION RESPIRATORY (INHALATION) at 07:18

## 2025-01-27 RX ADMIN — METOCLOPRAMIDE HYDROCHLORIDE 10 MG: 5 SOLUTION ORAL at 22:06

## 2025-01-27 RX ADMIN — ACETYLCYSTEINE 2 ML: 200 SOLUTION ORAL; RESPIRATORY (INHALATION) at 19:43

## 2025-01-27 RX ADMIN — ACETYLCYSTEINE 2 ML: 200 SOLUTION ORAL; RESPIRATORY (INHALATION) at 07:18

## 2025-01-27 RX ADMIN — BRIVARACETAM 100 MG: 10 SOLUTION ORAL at 21:02

## 2025-01-27 RX ADMIN — CARBAMAZEPINE 150 MG: 100 SUSPENSION ORAL at 23:55

## 2025-01-27 RX ADMIN — ACETYLCYSTEINE 2 ML: 200 SOLUTION ORAL; RESPIRATORY (INHALATION) at 15:35

## 2025-01-27 RX ADMIN — GLYCOPYRROLATE 2 MG: 1 TABLET ORAL at 10:05

## 2025-01-27 RX ADMIN — ALBUTEROL SULFATE 2.5 MG: 2.5 SOLUTION RESPIRATORY (INHALATION) at 15:35

## 2025-01-27 RX ADMIN — SENNOSIDES AND DOCUSATE SODIUM 2 TABLET: 50; 8.6 TABLET ORAL at 10:05

## 2025-01-27 RX ADMIN — AMOXICILLIN AND CLAVULANATE POTASSIUM 875 MG: 400; 57 POWDER, FOR SUSPENSION ORAL at 10:05

## 2025-01-27 RX ADMIN — POTASSIUM CHLORIDE 40 MEQ: 1.5 POWDER, FOR SOLUTION ORAL at 05:43

## 2025-01-27 RX ADMIN — Medication 40 MG: at 20:00

## 2025-01-27 RX ADMIN — CYANOCOBALAMIN TAB 1000 MCG 1000 MCG: 1000 TAB at 10:06

## 2025-01-27 RX ADMIN — AMOXICILLIN AND CLAVULANATE POTASSIUM 875 MG: 400; 57 POWDER, FOR SUSPENSION ORAL at 20:01

## 2025-01-27 RX ADMIN — ALBUTEROL SULFATE 2.5 MG: 2.5 SOLUTION RESPIRATORY (INHALATION) at 19:43

## 2025-01-27 RX ADMIN — ALBUTEROL SULFATE 2.5 MG: 2.5 SOLUTION RESPIRATORY (INHALATION) at 11:32

## 2025-01-27 RX ADMIN — METOCLOPRAMIDE HYDROCHLORIDE 10 MG: 5 SOLUTION ORAL at 06:07

## 2025-01-27 RX ADMIN — ACETYLCYSTEINE 2 ML: 200 SOLUTION ORAL; RESPIRATORY (INHALATION) at 11:32

## 2025-01-27 RX ADMIN — DEXTROSE MONOHYDRATE: 50 INJECTION, SOLUTION INTRAVENOUS at 10:56

## 2025-01-27 RX ADMIN — POLYETHYLENE GLYCOL 3350 17 G: 17 POWDER, FOR SOLUTION ORAL at 10:07

## 2025-01-27 RX ADMIN — Medication 40 MG: at 10:06

## 2025-01-27 RX ADMIN — MULTIVITAMIN TABLET 1 TABLET: TABLET at 10:05

## 2025-01-27 RX ADMIN — LEVOTHYROXINE SODIUM 88 MCG: 88 TABLET ORAL at 10:06

## 2025-01-27 RX ADMIN — CARBAMAZEPINE 100 MG: 100 SUSPENSION ORAL at 17:45

## 2025-01-27 RX ADMIN — METOCLOPRAMIDE HYDROCHLORIDE 10 MG: 5 SOLUTION ORAL at 11:20

## 2025-01-27 RX ADMIN — METOCLOPRAMIDE HYDROCHLORIDE 10 MG: 5 SOLUTION ORAL at 17:42

## 2025-01-27 RX ADMIN — BRIVARACETAM 100 MG: 10 SOLUTION ORAL at 11:20

## 2025-01-27 ASSESSMENT — ACTIVITIES OF DAILY LIVING (ADL)
ADLS_ACUITY_SCORE: 83
ADLS_ACUITY_SCORE: 78
ADLS_ACUITY_SCORE: 83
ADLS_ACUITY_SCORE: 78
ADLS_ACUITY_SCORE: 83
ADLS_ACUITY_SCORE: 78
ADLS_ACUITY_SCORE: 78
ADLS_ACUITY_SCORE: 83
ADLS_ACUITY_SCORE: 78
ADLS_ACUITY_SCORE: 83
ADLS_ACUITY_SCORE: 78
ADLS_ACUITY_SCORE: 83
ADLS_ACUITY_SCORE: 78
ADLS_ACUITY_SCORE: 83
ADLS_ACUITY_SCORE: 78
ADLS_ACUITY_SCORE: 83
ADLS_ACUITY_SCORE: 83
ADLS_ACUITY_SCORE: 78
ADLS_ACUITY_SCORE: 83
ADLS_ACUITY_SCORE: 78
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
## 2025-01-27 NOTE — PLAN OF CARE
Goal Outcome Evaluation:  1900-0730       Pt alert, HECTOR orientation. Pt has Hx of TBI, nonverbal at baseline but able to make incomprehensible sound/garbled speech. Pt able to understand simple commands. A2 with lift, A1-2 with turn and repo. Incontinent for both bowel and bladder, 2x BM this shift. On NPO, tolerating tube feeds. 1 PIV on L upper arm X SL.Continue plan of care. Continue to monitor.

## 2025-01-28 VITALS
HEIGHT: 70 IN | OXYGEN SATURATION: 97 % | BODY MASS INDEX: 23.04 KG/M2 | HEART RATE: 74 BPM | DIASTOLIC BLOOD PRESSURE: 67 MMHG | SYSTOLIC BLOOD PRESSURE: 136 MMHG | WEIGHT: 160.94 LBS | RESPIRATION RATE: 18 BRPM | TEMPERATURE: 98 F

## 2025-01-28 LAB
ANION GAP SERPL CALCULATED.3IONS-SCNC: 9 MMOL/L (ref 7–15)
BACTERIA BLD CULT: NO GROWTH
BACTERIA BLD CULT: NO GROWTH
BUN SERPL-MCNC: 13.5 MG/DL (ref 8–23)
CALCIUM SERPL-MCNC: 7.8 MG/DL (ref 8.8–10.4)
CHLORIDE SERPL-SCNC: 106 MMOL/L (ref 98–107)
CREAT SERPL-MCNC: 0.82 MG/DL (ref 0.67–1.17)
EGFRCR SERPLBLD CKD-EPI 2021: >90 ML/MIN/1.73M2
GLUCOSE BLDC GLUCOMTR-MCNC: 102 MG/DL (ref 70–99)
GLUCOSE BLDC GLUCOMTR-MCNC: 109 MG/DL (ref 70–99)
GLUCOSE BLDC GLUCOMTR-MCNC: 110 MG/DL (ref 70–99)
GLUCOSE BLDC GLUCOMTR-MCNC: 112 MG/DL (ref 70–99)
GLUCOSE BLDC GLUCOMTR-MCNC: 78 MG/DL (ref 70–99)
GLUCOSE SERPL-MCNC: 86 MG/DL (ref 70–99)
HCO3 SERPL-SCNC: 28 MMOL/L (ref 22–29)
HOLD SPECIMEN: NORMAL
PHOSPHATE SERPL-MCNC: 3 MG/DL (ref 2.5–4.5)
POTASSIUM SERPL-SCNC: 3.6 MMOL/L (ref 3.4–5.3)
SODIUM SERPL-SCNC: 143 MMOL/L (ref 135–145)

## 2025-01-28 PROCEDURE — 94640 AIRWAY INHALATION TREATMENT: CPT | Mod: 76

## 2025-01-28 PROCEDURE — 80048 BASIC METABOLIC PNL TOTAL CA: CPT | Performed by: HOSPITALIST

## 2025-01-28 PROCEDURE — 36415 COLL VENOUS BLD VENIPUNCTURE: CPT | Performed by: HOSPITALIST

## 2025-01-28 PROCEDURE — 250N000009 HC RX 250: Performed by: HOSPITALIST

## 2025-01-28 PROCEDURE — 99239 HOSP IP/OBS DSCHRG MGMT >30: CPT

## 2025-01-28 PROCEDURE — 250N000013 HC RX MED GY IP 250 OP 250 PS 637: Performed by: HOSPITALIST

## 2025-01-28 PROCEDURE — 84100 ASSAY OF PHOSPHORUS: CPT | Performed by: HOSPITALIST

## 2025-01-28 PROCEDURE — 999N000157 HC STATISTIC RCP TIME EA 10 MIN

## 2025-01-28 PROCEDURE — 94640 AIRWAY INHALATION TREATMENT: CPT

## 2025-01-28 RX ORDER — AMOXICILLIN AND CLAVULANATE POTASSIUM 400; 57 MG/5ML; MG/5ML
875 POWDER, FOR SUSPENSION ORAL 2 TIMES DAILY
Qty: 43.76 ML | Refills: 0 | Status: SHIPPED | OUTPATIENT
Start: 2025-01-28 | End: 2025-01-30

## 2025-01-28 RX ADMIN — GLYCOPYRROLATE 2 MG: 1 TABLET ORAL at 08:23

## 2025-01-28 RX ADMIN — METOCLOPRAMIDE HYDROCHLORIDE 10 MG: 5 SOLUTION ORAL at 15:49

## 2025-01-28 RX ADMIN — ALBUTEROL SULFATE 2.5 MG: 2.5 SOLUTION RESPIRATORY (INHALATION) at 14:20

## 2025-01-28 RX ADMIN — AMOXICILLIN AND CLAVULANATE POTASSIUM 875 MG: 400; 57 POWDER, FOR SUSPENSION ORAL at 08:23

## 2025-01-28 RX ADMIN — METOCLOPRAMIDE HYDROCHLORIDE 10 MG: 5 SOLUTION ORAL at 08:23

## 2025-01-28 RX ADMIN — BRIVARACETAM 100 MG: 10 SOLUTION ORAL at 08:23

## 2025-01-28 RX ADMIN — CARBAMAZEPINE 150 MG: 100 SUSPENSION ORAL at 13:05

## 2025-01-28 RX ADMIN — CARBAMAZEPINE 150 MG: 100 SUSPENSION ORAL at 06:04

## 2025-01-28 RX ADMIN — METOCLOPRAMIDE HYDROCHLORIDE 10 MG: 5 SOLUTION ORAL at 13:05

## 2025-01-28 RX ADMIN — ACETYLCYSTEINE 2 ML: 200 SOLUTION ORAL; RESPIRATORY (INHALATION) at 14:20

## 2025-01-28 RX ADMIN — Medication 40 MG: at 08:23

## 2025-01-28 RX ADMIN — ACETYLCYSTEINE 2 ML: 200 SOLUTION ORAL; RESPIRATORY (INHALATION) at 10:41

## 2025-01-28 RX ADMIN — CYANOCOBALAMIN TAB 1000 MCG 1000 MCG: 1000 TAB at 08:23

## 2025-01-28 RX ADMIN — ACETYLCYSTEINE 2 ML: 200 SOLUTION ORAL; RESPIRATORY (INHALATION) at 07:19

## 2025-01-28 RX ADMIN — MULTIVITAMIN TABLET 1 TABLET: TABLET at 08:23

## 2025-01-28 RX ADMIN — LEVOTHYROXINE SODIUM 88 MCG: 88 TABLET ORAL at 08:23

## 2025-01-28 RX ADMIN — ALBUTEROL SULFATE 2.5 MG: 2.5 SOLUTION RESPIRATORY (INHALATION) at 07:19

## 2025-01-28 RX ADMIN — ALBUTEROL SULFATE 2.5 MG: 2.5 SOLUTION RESPIRATORY (INHALATION) at 10:41

## 2025-01-28 ASSESSMENT — ACTIVITIES OF DAILY LIVING (ADL)
ADLS_ACUITY_SCORE: 83
ADLS_ACUITY_SCORE: 81
ADLS_ACUITY_SCORE: 81
ADLS_ACUITY_SCORE: 83
ADLS_ACUITY_SCORE: 81
ADLS_ACUITY_SCORE: 83

## 2025-01-28 NOTE — PLAN OF CARE
"Goal Outcome Evaluation:       Patient Name: Keyon Farias  MRN: [id number]  Date of Admission: 1/23/2025  Reason for Admission:    Acute respiratory failure with hypoxia (H)    Fever in adult    Recurrent aspiration pneumonia (H)  Level of Care: Acute    Vitals:   BP Readings from Last 1 Encounters:   01/27/25 124/68     Pulse Readings from Last 1 Encounters:   01/27/25 72     Wt Readings from Last 1 Encounters:   01/25/25 74.4 kg (164 lb 0.4 oz)     Ht Readings from Last 1 Encounters:   01/25/25 1.778 m (5' 10\")     Estimated body mass index is 23.53 kg/m  as calculated from the following:    Height as of this encounter: 1.778 m (5' 10\").    Weight as of this encounter: 74.4 kg (164 lb 0.4 oz).  Temp Readings from Last 1 Encounters:   01/27/25 98.2  F (36.8  C) (Axillary)       Pain: Pain goal 0 Pain Rating 0 Effective pain medication/regimen     CV Surgery Patient: No    Assessment    Resp: LS clear/diminished.  Telemetry: N/A  Neuro: Alert, non-verbal. Communicates with moaning, eyes, thumbs up, nodding and fist bumps. Gets angry at times and is very strong with left arm and hand. Appreciates being talked to and walked through things. Enjoys music and [employer] channel on the tv.  GI/: normoactive BS, multiple loose stools. Adequate urine output in external cath.  Skin/Wounds: Red, raw coccyx and perineum, keeping ANANYA, using triad paste.  Lines/Drains: Right PIV, Jpeg running tube feed at 60 ml per hour, at goal rate.  Activity: Bedrest, turn & repo Q2  Sleep: Adequate  Abnormal Labs: Na & phos    Aggression Stop Light: Green          Patient Care Plan: Continue with plan of care. Will discharge to home tomorrow if Na stabilizes.                  "

## 2025-01-28 NOTE — PROGRESS NOTES
Accessed chart to assist with medication administration when primary nurse was not available  Shaneka Schneider RN on 1/27/2025 at 7:03 PM

## 2025-01-28 NOTE — PROGRESS NOTES
CLINICAL NUTRITION SERVICES BRIEF NOTE  Discussed recommended change to home regimen with Savannah over the phone as follows:     For Home TF:   - Consider 4 bottles Boost High Protein (for 1000 kcal, 80 g protein) + 2 bottles Boost Plus (for 720 kcal, 28 g protein)  TOTAL = 1720 kcal, 108 g protein daily (to better meet energy needs without providing excess protein)   - Give 120 mL water every 4 hours (2 syringes 6x per day) for hydration (720 mL total).     OR change over to Boost Plus - 5 bottles/day (for 1800 kcal, 70 g protein)   - Give 120-160 mL water every 4 hours (720-960 mL total)     Discharge orders entered as outlined above.     Savannah repots having several cases of the High Protein. She plans to use it up with the top regimen, then switch over to boost plus only. Reviewed free water recommendations as outlined above.     Marisel Fernández RD, LD  Pager: [phone]  Available on [employer]

## 2025-01-28 NOTE — PROGRESS NOTES
Care Management Follow Up    Length of Stay (days): 5    Expected Discharge Date: 01/28/2025     Concerns to be Addressed: discharge planning  Mother will not consider alternative care facility  Patient plan of care discussed at interdisciplinary rounds: Yes    Anticipated Discharge Disposition: Home              Anticipated Discharge Services: Home Care, Transportation Services  Anticipated Discharge DME: None    Patient/family educated on Medicare website which has current facility and service quality ratings: no  Education Provided on the Discharge Plan: Yes  Patient/Family in Agreement with the Plan: yes    Referrals Placed by CM/SW: Homecare, Internal Clinic Care Coordination  Private pay costs discussed: Not applicable    Discussed  Partnership in Safe Discharge Planning  document with patient/family: No         Additional Information:  Transport set up for stretcher ride via [facility] for today at [phone]. Patient's mom Savannah called and informed her of time of transfer home.  Savannah shared she wants to make sure patient has scopalmine capsules returned to patient at time of discharge and also various toys including mechanical monkey, wheel that spins.   NANCI Vázquez notified of time of transport and patient belongings that need to be sent at discharge listed above.      Next Steps: Plan for transport at time listed above.          Indira Osman, Care Management RN, OCN  [facility]
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
## 2025-01-28 NOTE — PLAN OF CARE
Discharge time/date: 1/28/25 at 1800  Walked or Wheelchair: Ana via EMS  PIV removed: Yes  Reviewed AVS with patient: No - with mother via phone  Medication due times added to AVS in EPIC: Yes  Verbalized understanding of discharge with teachback: Yes  Medications retrieved from pharmacy: Yes  Supplies sent home: Yes  Belongings from security with patient: Yes
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
## 2025-01-28 NOTE — PLAN OF CARE
"Goal Outcome Evaluation:      Plan of Care Reviewed With: patient    Overall Patient Progress: improvingOverall Patient Progress: improving  Patient Name: Rl  MRN: [id number]  Date of Admission: 1/23/2025  Reason for Admission: Aspiration Pneumonia   Level of Care: Med-surge     Vitals:   BP Readings from Last 1 Encounters:   01/28/25 105/56     Pulse Readings from Last 1 Encounters:   01/28/25 68     Wt Readings from Last 1 Encounters:   01/25/25 74.4 kg (164 lb 0.4 oz)     Ht Readings from Last 1 Encounters:   01/25/25 1.778 m (5' 10\")     Estimated body mass index is 23.53 kg/m  as calculated from the following:    Height as of this encounter: 1.778 m (5' 10\").    Weight as of this encounter: 74.4 kg (164 lb 0.4 oz).  Temp Readings from Last 1 Encounters:   01/28/25 97.4  F (36.3  C) (Axillary)       Pain: Pt does not express pain     CV Surgery Patient: No    Assessment    Resp: LSC, RA   Telemetry: No tele, NSR  Neuro: Alert, HECTOR orientation, non-verbal   GI/: Strict NPO, PEGJ Tube w TF at 60, external male purewick, good UO, BM-   Skin/Wounds: redness on bottom   Lines/Drains: LPIV SL, PEGJ   Activity: Bedrest   Sleep: Good   Abnormal Labs: P, K protocol     Aggression Stop Light: Green          Patient Care Plan: Monitor, turn repo q2, possible discharge     "

## 2025-01-29 ENCOUNTER — PATIENT OUTREACH (OUTPATIENT)
Dept: CARE COORDINATION | Facility: CLINIC | Age: 65
End: 2025-01-29
Payer: MEDICARE

## 2025-01-29 NOTE — LETTER
[facility] COORDIN[facility]  [address] [place] [address]    January 29, 2025    Keyon GLADYS Farias  [address] [place] [address]      Dear Keyon,    I am a clinic care coordinator who works with Elsa Queen MD with the [facility]. I wanted to thank you for spending the time to talk with me.  Below is a description of clinic care coordination and how I can further assist you.       The clinic care coordination team is made up of a registered nurse, , financial resource worker and community health worker who understand the health care system. The goal of clinic care coordination is to help you manage your health and improve access to the health care system. Our team works alongside your provider to assist you in determining your health and social needs. We can help you obtain health care and community resources, providing you with necessary information and education. We can work with you through any barriers and develop a care plan that helps coordinate and strengthen the communication between you and your care team.  Our services are voluntary and are offered without charge to you personally.    Please feel free to contact me with any questions or concerns regarding care coordination and what we can offer.      We are focused on providing you with the highest-quality healthcare experience possible.    Sincerely,     Courtney Nuñez RN Clinic Care Coordinator  [facility]: [place], [place] (on-site Wednesdays), [facility] (on-site Thursdays) & [facility].  [email or web address]  Phone: [phone]

## 2025-01-29 NOTE — PROGRESS NOTES
Clinic Care Coordination Contact  Transitions of Care Outreach  Chief Complaint   Patient presents with    Clinic Care Coordination - Post Hospital       Most Recent Admission Date: 1/23/2025   Most Recent Admission Diagnosis: Recurrent aspiration pneumonia (H) - J69.0  Acute respiratory failure with hypoxia (H) - J96.01  Fever in adult - R50.9  Sepsis with acute hypoxic respiratory failure without septic shock, due to unspecified organism (H) - A41.9, R65.20, J96.01     Most Recent Discharge Date: 1/28/2025   Most Recent Discharge Diagnosis: Recurrent aspiration pneumonia (H) - J69.0  Acute respiratory failure with hypoxia (H) - J96.01  Fever in adult - R50.9  Sepsis with acute hypoxic respiratory failure without septic shock, due to unspecified organism (H) - A41.9, R65.20, J96.01  Aspiration pneumonia of right lung, unspecified aspiration pneumonia type, unspecified part of lung (H) - J69.0  Increased tracheal secretions - J39.8  Aspiration pneumonia of both lungs, unspecified aspiration pneumonia type, unspecified part of lung (H) - J69.0     Transitions of Care Assessment    Discharge Assessment  How are you doing now that you are home?: Doing ok. Having some increased mucus.  How are your symptoms? (Red Flag symptoms escalate to triage hotline per guidelines): Improved  Do you know how to contact your clinic care team if you have future questions or changes to your health status? : Yes  Does the patient have their discharge instructions? : Yes  Does the patient have questions regarding their discharge instructions? : No  Were you started on any new medications or were there changes to any of your previous medications? : Yes  Does the patient have all of their medications?: Yes  Do you have questions regarding any of your medications? : No  Do you have all of your needed medical supplies or equipment (DME)?  (i.e. oxygen tank, CPAP, cane, etc.): Yes         Post-op (Clinicians Only)  Did the patient have  surgery or a procedure: No  Fever: No  Chills: No  Eating & Drinking: NPO  PO Intake: other (Tube feeding.)  Additional Symptoms:  (Denies)  Bowel Function: normal  Date of last BM: 01/28/25  Urinary Status: voiding without complaint/concerns    Care Management   None    Follow up Plan     Patient declined Care Coordination services at this time.   Patient is aware of how to initiate Care Coordination services with the clinic in the future.       Discharge Follow-Up  Discharge follow up appointment scheduled in alignment with recommended follow up timeframe or Transitions of Risk Category? (Low = within 30 days; Moderate= within 14 days; High= within 7 days): Yes  Discharge Follow Up Appointment Date: 01/30/25  Discharge Follow Up Appointment Scheduled with?: Primary Care Provider    Future Appointments   Date Time Provider Department Center   1/30/2025  4:30 PM Ledy Rosas APRN CNP [facility]    5/2/2025 12:15 PM Juana Tierney PA-C [facility]       Outpatient Plan as outlined on AVS reviewed with patient.    For any urgent concerns, please contact our 24 hour nurse triage line: [phone] ([phone])       Courtney Nuñez RN

## 2025-01-29 NOTE — PROGRESS NOTES
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 No fever, oxygen level is back to baseline at this time.     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  Develops intermittently hyponatremia that needs treatment with DDAVP.  For example during his last hospitalization he needed DDAVP for treatment of hypernatremia.  Over the last 12 months has not been on DDAVP. Recently checked Na was stable.     Secondary adrenal insufficiency  Continue Hydrocortisone (HC) 15 mg in the AM, 5mg at 3:00 PM     The lowest effective dose of hydrocortisone is what is recommended to be used.  - sick day rules - increase hydrocortisone to 2-3X of the maintenance dose during sickness like flu. Needs injection if unable to keep medication down due to vomiting.   Injectable form of hydrocortisone sent to the pharmacy to be used as needed.      Central hypothyroidism   -continue Levothyroxine 150 mcg daily.  Last free T4 within the recommended range.     Central hypogonadism   -Last testosterone level is slightly higher than the recommended range therefore I have advised to reduce the dose of testosterone to 60 mg every 7 days from previous dose of 76 mg every 7 days.   - PSA stable.  Hematocrit recently checked and was stable.     I will contact the patient with the test results.  Return to clinic in 6 months.  Patient Instructions   Mykel Nuñez   It was a pleasure meeting you.    Secondary adrenal insufficiency  -Hydrocortisone (HC) 15 mg in the AM, 5 mg at 3:00 am.   - sick day rules - increase hydrocortisone to 3X of the maintenance dose during sickness like flu.  (for eg. Hydrocortisone to be increased to 15  mg 3 times per day during illness until he gets better). Then after he improves from illness; dose needs to go back to his usual dose of 15 mg in the morning and 5 mg in the afternoon.   -Needs injection form of hydrocortisone if unable to keep medication down due to vomiting.    - use Injectable form of hydrocortisone as needed.     Central hypothyroidism   -continue Levothyroxine 150 mcg daily     Central hypogonadism  -  testosterone dose to 0.3 ml (60 mg) every 7 days        Central diabetes insipidus   -Follow free water replacement therapy as previously advised  - change in mental status from baseline should prompt sodium level checks.   - If urine output increases to more than 3 L- please check sodium level.   - There is a standing order for Sodium checks as needed. Check a follow up sodium.        Please let us know if any questions.      Test and/or medications prescribed today:  Orders Placed This Encounter   Procedures     Testosterone total       Faizan Mclean MD  Staff Endocrinologist    Division of Endocrinology and Diabetes      Subjective:         HPI: Keyon Farias is a 61 year old male with history of panhypopituitarism.    Admitted with possible recurrent aspiration pneumonitis/pneumonia on 12/27/21 and discharged home. No fever, oxygen level is back to baseline.       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  Develops intermittently hyponatremia that needs treatment with DDAVP.  For example during his last hospitalization he needed DDAVP for treatment of hypernatremia.  Over the last 12 months has not been on DDAVP. Last Na on 12/27/21 was normal. UO and intake is stable.     Secondary adrenal insufficiency  On Hydrocortisone (HC) 15 mg in the AM, 5mg at 3:00 PM     The lowest effective dose of hydrocortisone is what is recommended to be used.  Understands sick day rules - increase hydrocortisone to 2-3X of the maintenance  dose during sickness like flu. Needs injection if unable to keep medication down due to vomiting.   Injectable form of hydrocortisone sent to the pharmacy to be used as needed.      Central hypothyroidism   -continue Levothyroxine 150 mcg daily.  Last free T4 within the recommended range.     Central hypogonadism   -currently on testosterone 60 mg every 7 days.   - PSA stable.  hematocrit stable.    J-tube functioning well.      Urine output has been stable between 1-1.5 L/day.  Free water administration has been about 120 cc every 4 hours.         Allergies   Allergen Reactions     Valproic Acid Other (See Comments)     Toxicity w/ bone marrow suspension, elevated ammonia levels      Dilantin [Phenytoin Sodium] Other (See Comments)     Severe Trembling     Scopolamine Hives     Hives with the patch - oral no problem       Current Outpatient Medications   Medication Sig Dispense Refill     acetaminophen (TYLENOL 8 HOUR) 650 MG CR tablet Take 650 mg by mouth every 8 hours as needed for mild pain or fever       acetylcysteine (MUCOMYST) 20 % neb solution Take 2 mLs by nebulization 4 times daily With albuterol at 0700, 1100, 1500, and 1900        albuterol (PROVENTIL) (5 MG/ML) 0.5% neb solution Take 2.5 mg by nebulization every 4 hours (while awake) 0700 1100 1500 1900 with mucomyst        aspirin (ASA) 81 MG chewable tablet 81 mg by Oral or Feeding Tube route daily At 0900       bacitracin ointment Apply topically daily as needed for wound care To PEG site.        Brivaracetam (BRIVIACT) 10 MG/ML solution 100 mg by Oral or Feeding Tube route 2 times daily 0900, 2100       calcium carbonate 1250 MG/5ML SUSP suspension Take 1,250 mg by mouth 2 times daily 0900, 2100       carBAMazepine (TEGRETOL) 100 MG/5ML suspension Take 100 mg by mouth daily Take at 1800        carBAMazepine (TEGRETOL) 100 MG/5ML suspension 150 mg by Oral or Feeding Tube route 3 times daily At 06:00, 12:00, and 24:00 for seizures        hydrocortisone (CORTAID) 1 % external cream Apply topically 2 times daily as needed Apply to reddened memo areas as needed       hydrocortisone (CORTEF) 5 MG tablet Take 15 mg (3 tablets) in the morning and 5 mg (1 tablet)  at 2:00 PM. During illness patient takes more as a stress dose. Please increase the dose as directed. 400 tablet 3     levothyroxine (SYNTHROID/LEVOTHROID) 150 MCG tablet Take 1 tablet (150 mcg) by mouth daily 90 tablet 3     metoclopramide (REGLAN) 10 MG/10ML SOLN solution Take 10 mg by mouth 4 times daily (before meals and nightly) 0800, 1200, 1600, 2000  Disconnects bag before administration, then waits 45 mins before reconnecting after giving the medication       multivitamin, therapeutic (THERA-VIT) TABS tablet Take 1 tablet by mouth daily       mupirocin (BACTROBAN) 2 % external ointment Apply topically 2 times daily as needed        pantoprazole (PROTONIX) 2 mg/mL SUSP suspension 20 mLs (40 mg) by Per J Tube route daily 400 mL 0     potassium & sodium phosphates (NEUTRA-PHOS) 280-160-250 MG Packet Take 1 packet by mouth 3 times daily        Scopolamine HBr POWD Dispense #90. Mix contents with small amount of water for admin via J-tube.  Administer 0.8 mg three times each day. (Patient taking differently: Dispense #90. Mix contents with small amount of water for admin via J-tube.  Administer 0.8 mg three times each day as needed.) 450 g 1     Skin Protectants, Misc. (BALMEX SKIN PROTECTANT) OINT Externally apply topically 2 times daily as needed (irritation) Applay to reddened memo areas twice daily as needed       sodium bicarbonate 650 MG tablet Take 1 tablet (650 mg) by mouth daily 30 tablet 0     testosterone cypionate (DEPOTESTOSTERONE) 200 MG/ML injection Inject 0.3 mLs (60 mg) into the muscle every 7 days 6 mL 1     vitamin C (ASCORBIC ACID) 1000 MG TABS 1,000 mg by Oral or Feeding Tube route daily        vitamin D3 (CHOLECALCIFEROL) 2000 units (50 mcg) tablet Take 2,000 Units by  mouth daily Crush and feed via j-tube @@ 0900         Review of Systems      as per HPI above      Objective:   Mykel is engaged today.  Tries to use words to communicate.  Other exam was not completed as this was virtual visit    In House Labs:   Hemoglobin A1C POCT   Date Value Ref Range Status   05/23/2021 5.6 0 - 5.6 % Final     Comment:     Normal <5.7% Prediabetes 5.7-6.4%  Diabetes 6.5% or higher - adopted from ADA   consensus guidelines.     08/12/2020 5.9 (H) 0 - 5.6 % Final     Comment:     Normal <5.7% Prediabetes 5.7-6.4%  Diabetes 6.5% or higher - adopted from ADA   consensus guidelines.     08/12/2019 6.1 (H) 0 - 5.6 % Final     Comment:     Normal <5.7% Prediabetes 5.7-6.4%  Diabetes 6.5% or higher - adopted from ADA   consensus guidelines.         T4 Free   Date Value Ref Range Status   04/22/2021 1.40 0.76 - 1.46 ng/dL Final     Free T4   Date Value Ref Range Status   12/12/2021 1.04 0.76 - 1.46 ng/dL Final         Creatinine   Date Value Ref Range Status   12/27/2021 0.90 0.66 - 1.25 mg/dL Final   05/24/2021 0.80 0.66 - 1.25 mg/dL Final   ]  Last Comprehensive Metabolic Panel:  Sodium   Date Value Ref Range Status   12/27/2021 141 133 - 144 mmol/L Final   05/24/2021 143 133 - 144 mmol/L Final     Potassium   Date Value Ref Range Status   12/27/2021 3.9 3.4 - 5.3 mmol/L Final   05/24/2021 4.1 3.4 - 5.3 mmol/L Final     Chloride   Date Value Ref Range Status   12/27/2021 109 94 - 109 mmol/L Final   05/24/2021 112 (H) 94 - 109 mmol/L Final     Carbon Dioxide   Date Value Ref Range Status   05/24/2021 27 20 - 32 mmol/L Final     Carbon Dioxide (CO2)   Date Value Ref Range Status   12/27/2021 30 20 - 32 mmol/L Final     Anion Gap   Date Value Ref Range Status   12/27/2021 2 (L) 3 - 14 mmol/L Final   05/24/2021 4 3 - 14 mmol/L Final     Glucose   Date Value Ref Range Status   12/27/2021 115 (H) 70 - 99 mg/dL Final   05/24/2021 141 (H) 70 - 99 mg/dL Final     Urea Nitrogen   Date Value Ref Range Status    12/27/2021 16 7 - 30 mg/dL Final   05/24/2021 9 7 - 30 mg/dL Final     Creatinine   Date Value Ref Range Status   12/27/2021 0.90 0.66 - 1.25 mg/dL Final   05/24/2021 0.80 0.66 - 1.25 mg/dL Final     GFR Estimate   Date Value Ref Range Status   12/27/2021 >90 >60 mL/min/1.73m2 Final     Comment:     Effective December 21, 2021 eGFRcr in adults is calculated using the 2021 CKD-EPI creatinine equation which includes age and gender (Kyung et al., NEJM, DOI: 10.1056/WTMVwa8699632)   05/24/2021 >90 >60 mL/min/[1.73_m2] Final     Comment:     Non  GFR Calc  Starting 12/18/2018, serum creatinine based estimated GFR (eGFR) will be   calculated using the Chronic Kidney Disease Epidemiology Collaboration   (CKD-EPI) equation.       Calcium   Date Value Ref Range Status   12/27/2021 8.7 8.5 - 10.1 mg/dL Final   05/24/2021 8.3 (L) 8.5 - 10.1 mg/dL Final       Video-Visit Details  Type of service:  Video Visit  Video Start Time: 1:39  Video End Time: 1:58  Originating Location (pt. Location): Home  [place]  [facility] [place] and phone call used for this visit.   More than minutes spent on the date of the encounter doing chart review, history, documentation and further activities per the note.      all other ROS negative except as per HPI

## 2025-01-30 ENCOUNTER — TELEPHONE (OUTPATIENT)
Dept: FAMILY MEDICINE | Facility: CLINIC | Age: 65
End: 2025-01-30

## 2025-01-30 NOTE — TELEPHONE ENCOUNTER
MTM referral from: Transitions of Care (recent hospital discharge, TCU discharge, or ED visit)    MT referral outreach attempt #2 on January 30, 2025 at 2:54 PM      Outcome: Spoke with patient seeing primary    Use vbc for the carrier/Plan on the flowsheet          ESTHER Kang   [phone]

## 2025-02-04 ENCOUNTER — TELEPHONE (OUTPATIENT)
Dept: FAMILY MEDICINE | Facility: CLINIC | Age: 65
End: 2025-02-04
Payer: MEDICARE

## 2025-02-04 DIAGNOSIS — J69.0 ASPIRATION PNEUMONIA OF BOTH LUNGS, UNSPECIFIED ASPIRATION PNEUMONIA TYPE, UNSPECIFIED PART OF LUNG (H): ICD-10-CM

## 2025-02-04 DIAGNOSIS — R63.39 OTHER FEEDING DIFFICULTIES: ICD-10-CM

## 2025-02-04 DIAGNOSIS — E46 MALNUTRITION, UNSPECIFIED TYPE: ICD-10-CM

## 2025-02-04 RX ORDER — SODIUM,POTASSIUM PHOSPHATES 280-250MG
POWDER IN PACKET (EA) ORAL
Qty: 100 PACKET | Refills: 3 | Status: SHIPPED | OUTPATIENT
Start: 2025-02-04

## 2025-02-04 RX ORDER — GLYCOPYRROLATE 1 MG/5ML
SOLUTION ORAL
Qty: 600 ML | Refills: 0 | OUTPATIENT
Start: 2025-02-04

## 2025-02-04 NOTE — TELEPHONE ENCOUNTER
Medication Question or Refill    Contacts       Contact Date/Time Type Contact Phone/Fax    02/04/2025 08:55 AM CST Interface (Incoming) [id number] - [place], [place] - [address] AT [facility] [place], [place] [address] [place] (Pharmacy) [phone]    02/04/2025 09:12 AM CST Phone (Incoming) Savannah Farias (GUARDIAN) (Mother) [phone] (M)            What medication are you calling about (include dose and sig)?: glycopyrrolate and carbamazepine     Preferred Pharmacy:   [id number] - [place], [place] - [address] AT [facility] [place] [place] [address]  [facility] [place] [address]  Phone: [phone] Fax: [phone]    Controlled Substance Agreement on file:   CSA -- Patient Level:    CSA: None found at the patient level.       Who prescribed the medication?: Mark Causey and Dr. Queen     Do you need a refill? Yes    When did you use the medication last? Less than a week     Patient offered an appointment? No    Do you have any questions or concerns?  Yes: patient mom requesting to see if there can be refill ready whenever they are ready for another fill.     Okay to leave a detailed message?: Yes at Cell number on file:    Telephone Information:   Mobile [phone]

## 2025-02-07 DIAGNOSIS — G40.803 INTRACTABLE EPILEPSY DUE TO EXTERNAL CAUSES WITH STATUS EPILEPTICUS (H): ICD-10-CM

## 2025-02-07 NOTE — TELEPHONE ENCOUNTER
Patient has 2 Rx's for carbamazepine.  For 7.5mL and 5mL TID each.    Pharmacy needs new Rx for the 5mL     Radhapreston Omer, RT (R)

## 2025-02-09 ENCOUNTER — NURSE TRIAGE (OUTPATIENT)
Dept: NURSING | Facility: CLINIC | Age: 65
End: 2025-02-09
Payer: MEDICARE

## 2025-02-09 ENCOUNTER — TELEPHONE (OUTPATIENT)
Dept: FAMILY MEDICINE | Facility: CLINIC | Age: 65
End: 2025-02-09
Payer: MEDICARE

## 2025-02-09 NOTE — TELEPHONE ENCOUNTER
Mom is the Guardian of Pt and a consent is not on file; Mom will fill out a form for Consent to Communicate on pt behalf, pt has appointment 2/11/25 and Mom will fill  out Consent to Communicate form.  Mom states she is Pt Legal Guardian; Pt is unable to communicate and give consent.      Mom states pt is out of seizure medication.  Mom requesting refill.  Mom states Pharmacy won't let her pay for the Rx out of pocket because it is not the 15th of the month when refill is due to be refilled.  Pt gets Carbamazepine 100 mg/5mL, give 7.5 ml three times/day and 5.0 mL once per day and Mom has been giving all four doses one bottle she has and Mom states she never picked picked up the 5mL Rx prescribed 9/26/24.  Pt also takes Brivaracetam and has that medication.  Mom states Prior Authorization needed as Insurance won't cover medication as it is not the 15th of the month.  Pt is out of medication; Mom will call Pharmacy now to see if she can get a bridge of medication until Rx refilled.    Please advise Mom can be reached [phone].  Thank you  Emily Cruz RN  FNA Nurse Advisor    Reason for Disposition   Patient has refills remaining on their prescription    Additional Information   Negative: New-onset or worsening symptoms, see that guideline (e.g., diarrhea, runny nose, sore throat)   Negative: Medicine question not related to refill or renewal   Negative: Caller (e.g., patient or pharmacist) requesting information about a new medicine   Negative: Caller requesting information unrelated to medicine   Negative: [1] Prescription refill request for ESSENTIAL medicine (i.e., likelihood of harm to patient if not taken) AND [2] triager unable to refill per department policy   Negative: [1] Prescription not at pharmacy AND [2] was prescribed by PCP recently  (Exception: Triager has access to EMR and prescription is recorded there. Go to Home Care and confirm for pharmacy.)   Negative: [1] Pharmacy calling with  prescription questions AND [2] triager unable to answer question   Negative: Prescription request for new medicine (not a refill)   Negative: Caller requesting a CONTROLLED substance prescription refill (e.g., narcotics, ADHD medicines)   Negative: [1] Prescription refill request for NON-ESSENTIAL medicine (i.e., no harm to patient if med not taken) AND [2] triager unable to refill per department policy   Negative: [1] Caller has NON-URGENT medicine question about med that PCP prescribed AND [2] triager unable to answer question   Negative: [1] Prescription prescribed recently is not at pharmacy AND [2] triager has access to patient's EMR AND [3] prescription is recorded in the EMR   Negative: [1] Caller requesting a prescription renewal (no refills left), no triage required, AND [2] triager able to renew prescription per department policy    Protocols used: Medication Refill and Renewal Call-A-

## 2025-02-09 NOTE — TELEPHONE ENCOUNTER
FYI - Status Update    Who is Calling: Savannah Farias    Update: was calling about the CarBAMazepine 5ML Mom said patient is doing this med 4 doses a day/ she said insurance wont cover this now because it says 3 doses a day and saying its too soon to fill. and she can't afford to pay out of pocket. Patient ran out of this med on 2/9/25    Does caller want a call/response back: Yes     Okay to leave a detailed message?: Yes at Cell number on file:    Telephone Information:   Mobile [phone]

## 2025-02-10 ENCOUNTER — TELEPHONE (OUTPATIENT)
Dept: FAMILY MEDICINE | Facility: CLINIC | Age: 65
End: 2025-02-10
Payer: MEDICARE

## 2025-02-10 DIAGNOSIS — G40.803 INTRACTABLE EPILEPSY DUE TO EXTERNAL CAUSES WITH STATUS EPILEPTICUS (H): ICD-10-CM

## 2025-02-10 RX ORDER — CARBAMAZEPINE 100 MG/5ML
150 SUSPENSION ORAL 3 TIMES DAILY
Qty: 2025 ML | Refills: 3 | Status: SHIPPED | OUTPATIENT
Start: 2025-02-10 | End: 2025-02-11

## 2025-02-10 RX ORDER — CARBAMAZEPINE 100 MG/5ML
100 SUSPENSION ORAL DAILY
Qty: 450 ML | Refills: 3 | Status: SHIPPED | OUTPATIENT
Start: 2025-02-10 | End: 2025-02-11

## 2025-02-10 NOTE — TELEPHONE ENCOUNTER
Pt's mom & guardian calling to state Rx needs to all be on one Rx. Past Rx's have been incorrect.  This is to ensure insurance will cover all of the Rx.     Instructions need to state 7.5 mLs three times a day and 5 mL one time a day. Total amount per day (27.5 mL/day)    Send to Rory Erickson.      Pt is OUT OF MEDICATION.     Ingris Payan on 2/10/2025 at 5:22 PM

## 2025-02-10 NOTE — TELEPHONE ENCOUNTER
To Bret,   Called to relay that medication was ordered to patient's mother, Savannah. Per Savannah, Insurance will only cover one prescription for this. She is requesting that the two prescriptions be combined into one with the instructions saying to take 7.5 mLs three times a day and 5 mL once a day. Please advise on if new prescription for this can be sent or other advice.     Aissatou Hurley RN

## 2025-02-10 NOTE — TELEPHONE ENCOUNTER
Patient's mother called the clinic and stated that they have been running short on the medication every time because the sig is wrong. Patient has been taking 7.5 mL TID and 5 mL daily. Patient is currently out of the medication and is needing a new prescription with updated sig sent to the pharmacy. Routing to provider to review and advise.     Please call mother back when the medication is sent in.     Liliana MADDOX RN  [facility] Triage Team

## 2025-02-10 NOTE — TELEPHONE ENCOUNTER
[facility] was called. Pharmacy is unable to fill  carBAMazepine (TEGRETOL) 100 MG/5ML suspension until 02/15/2025 unless there is dose change.     Triage attempted to call patients mother/guardian.  is full unable to leave . Triage to call mother back to ask her to contact patients insurance for early fill approval.

## 2025-02-10 NOTE — TELEPHONE ENCOUNTER
Pt's mother called back to the clinic regarding pt's Tegretol medication. Pt's mom unable to  script due to insurance not covering the two separate script.     Therefore pt's mom is requesting the Tegretol medication be on one script for insurance to be able to cover it.     Pharmacy is pended for your review if appropriate. Please advise, thank you!    Gilberto Lino RN  [facility]

## 2025-02-11 RX ORDER — CARBAMAZEPINE 100 MG/5ML
SUSPENSION ORAL
Qty: 2025 ML | Refills: 3 | Status: SHIPPED | OUTPATIENT
Start: 2025-02-11

## 2025-02-13 ENCOUNTER — TELEPHONE (OUTPATIENT)
Dept: FAMILY MEDICINE | Facility: CLINIC | Age: 65
End: 2025-02-13
Payer: MEDICARE

## 2025-02-13 NOTE — TELEPHONE ENCOUNTER
Patients guardian calling because [employer] is needing a form filled out miguel has 5 questions on it for the patient to get his wheelchair they state they got the chart notes but not this form, requested they refax incase we dont still have it     It can be sent back to [employer] at one of these numbers mom not sure which number is the phone and which is fax [phone]

## 2025-02-14 ENCOUNTER — VIRTUAL VISIT (OUTPATIENT)
Dept: FAMILY MEDICINE | Facility: CLINIC | Age: 65
End: 2025-02-14
Payer: MEDICARE

## 2025-02-14 ENCOUNTER — MEDICAL CORRESPONDENCE (OUTPATIENT)
Dept: HEALTH INFORMATION MANAGEMENT | Facility: CLINIC | Age: 65
End: 2025-02-14

## 2025-02-14 ENCOUNTER — APPOINTMENT (OUTPATIENT)
Dept: GENERAL RADIOLOGY | Facility: CLINIC | Age: 65
End: 2025-02-14
Attending: EMERGENCY MEDICINE
Payer: MEDICARE

## 2025-02-14 ENCOUNTER — HOSPITAL ENCOUNTER (EMERGENCY)
Facility: CLINIC | Age: 65
Discharge: HOME | End: 2025-02-14
Attending: EMERGENCY MEDICINE | Admitting: EMERGENCY MEDICINE
Payer: MEDICARE

## 2025-02-14 VITALS
SYSTOLIC BLOOD PRESSURE: 113 MMHG | RESPIRATION RATE: 25 BRPM | TEMPERATURE: 98.7 F | HEART RATE: 97 BPM | OXYGEN SATURATION: 93 % | DIASTOLIC BLOOD PRESSURE: 73 MMHG

## 2025-02-14 DIAGNOSIS — G40.909 RECURRENT SEIZURES (H): ICD-10-CM

## 2025-02-14 DIAGNOSIS — E86.0 DEHYDRATION: ICD-10-CM

## 2025-02-14 DIAGNOSIS — S06.9X9S TRAUMATIC BRAIN INJURY WITH LOSS OF CONSCIOUSNESS, SEQUELA: ICD-10-CM

## 2025-02-14 DIAGNOSIS — G81.01 FLACCID HEMIPLEGIA AFFECTING RIGHT DOMINANT SIDE, UNSPECIFIED ETIOLOGY (H): Primary | ICD-10-CM

## 2025-02-14 DIAGNOSIS — R50.9 FEVER, UNSPECIFIED FEVER CAUSE: ICD-10-CM

## 2025-02-14 DIAGNOSIS — Z99.3 WHEELCHAIR DEPENDENCE: ICD-10-CM

## 2025-02-14 LAB
ALBUMIN SERPL BCG-MCNC: 3.8 G/DL (ref 3.5–5.2)
ALBUMIN UR-MCNC: NEGATIVE MG/DL
ALP SERPL-CCNC: 103 U/L (ref 40–150)
ALT SERPL W P-5'-P-CCNC: 28 U/L (ref 0–70)
ANION GAP SERPL CALCULATED.3IONS-SCNC: 14 MMOL/L (ref 7–15)
APPEARANCE UR: CLEAR
AST SERPL W P-5'-P-CCNC: 45 U/L (ref 0–45)
BASOPHILS # BLD AUTO: 0.1 10E3/UL (ref 0–0.2)
BASOPHILS NFR BLD AUTO: 1 %
BILIRUB SERPL-MCNC: 0.3 MG/DL
BILIRUB UR QL STRIP: NEGATIVE
BUN SERPL-MCNC: 33.4 MG/DL (ref 8–23)
CALCIUM SERPL-MCNC: 9 MG/DL (ref 8.8–10.4)
CHLORIDE SERPL-SCNC: 97 MMOL/L (ref 98–107)
COLOR UR AUTO: YELLOW
CREAT SERPL-MCNC: 1.17 MG/DL (ref 0.67–1.17)
EGFRCR SERPLBLD CKD-EPI 2021: 70 ML/MIN/1.73M2
EOSINOPHIL # BLD AUTO: 1 10E3/UL (ref 0–0.7)
EOSINOPHIL NFR BLD AUTO: 7 %
ERYTHROCYTE [DISTWIDTH] IN BLOOD BY AUTOMATED COUNT: 15.2 % (ref 10–15)
FLUAV RNA SPEC QL NAA+PROBE: NEGATIVE
FLUBV RNA RESP QL NAA+PROBE: NEGATIVE
GLUCOSE SERPL-MCNC: 151 MG/DL (ref 70–99)
GLUCOSE UR STRIP-MCNC: NEGATIVE MG/DL
HCO3 SERPL-SCNC: 28 MMOL/L (ref 22–29)
HCT VFR BLD AUTO: 41.4 % (ref 40–53)
HGB BLD-MCNC: 13.3 G/DL (ref 13.3–17.7)
HGB UR QL STRIP: NEGATIVE
IMM GRANULOCYTES # BLD: 0 10E3/UL
IMM GRANULOCYTES NFR BLD: 0 %
KETONES UR STRIP-MCNC: NEGATIVE MG/DL
LACTATE SERPL-SCNC: 1.7 MMOL/L (ref 0.7–2)
LEUKOCYTE ESTERASE UR QL STRIP: NEGATIVE
LYMPHOCYTES # BLD AUTO: 2 10E3/UL (ref 0.8–5.3)
LYMPHOCYTES NFR BLD AUTO: 14 %
MCH RBC QN AUTO: 27 PG (ref 26.5–33)
MCHC RBC AUTO-ENTMCNC: 32.1 G/DL (ref 31.5–36.5)
MCV RBC AUTO: 84 FL (ref 78–100)
MONOCYTES # BLD AUTO: 1.2 10E3/UL (ref 0–1.3)
MONOCYTES NFR BLD AUTO: 8 %
NEUTROPHILS # BLD AUTO: 10.4 10E3/UL (ref 1.6–8.3)
NEUTROPHILS NFR BLD AUTO: 71 %
NITRATE UR QL: NEGATIVE
NRBC # BLD AUTO: 0 10E3/UL
NRBC BLD AUTO-RTO: 0 /100
PH UR STRIP: 7 [PH] (ref 5–7)
PLATELET # BLD AUTO: 271 10E3/UL (ref 150–450)
POTASSIUM SERPL-SCNC: 3.9 MMOL/L (ref 3.4–5.3)
PROT SERPL-MCNC: 7.9 G/DL (ref 6.4–8.3)
RBC # BLD AUTO: 4.93 10E6/UL (ref 4.4–5.9)
RBC URINE: <1 /HPF
RSV RNA SPEC NAA+PROBE: NEGATIVE
SARS-COV-2 RNA RESP QL NAA+PROBE: NEGATIVE
SODIUM SERPL-SCNC: 139 MMOL/L (ref 135–145)
SP GR UR STRIP: 1.02 (ref 1–1.03)
UROBILINOGEN UR STRIP-MCNC: 2 MG/DL
WBC # BLD AUTO: 14.7 10E3/UL (ref 4–11)
WBC URINE: <1 /HPF

## 2025-02-14 PROCEDURE — 99284 EMERGENCY DEPT VISIT MOD MDM: CPT | Mod: 25

## 2025-02-14 PROCEDURE — 96360 HYDRATION IV INFUSION INIT: CPT

## 2025-02-14 PROCEDURE — 98005 SYNCH AUDIO-VIDEO EST LOW 20: CPT | Performed by: PHYSICIAN ASSISTANT

## 2025-02-14 PROCEDURE — 81001 URINALYSIS AUTO W/SCOPE: CPT | Performed by: EMERGENCY MEDICINE

## 2025-02-14 PROCEDURE — 87637 SARSCOV2&INF A&B&RSV AMP PRB: CPT | Performed by: EMERGENCY MEDICINE

## 2025-02-14 PROCEDURE — 36415 COLL VENOUS BLD VENIPUNCTURE: CPT | Performed by: EMERGENCY MEDICINE

## 2025-02-14 PROCEDURE — 71046 X-RAY EXAM CHEST 2 VIEWS: CPT

## 2025-02-14 PROCEDURE — 85025 COMPLETE CBC W/AUTO DIFF WBC: CPT | Performed by: EMERGENCY MEDICINE

## 2025-02-14 PROCEDURE — 83605 ASSAY OF LACTIC ACID: CPT | Performed by: EMERGENCY MEDICINE

## 2025-02-14 PROCEDURE — 96361 HYDRATE IV INFUSION ADD-ON: CPT

## 2025-02-14 PROCEDURE — 258N000003 HC RX IP 258 OP 636: Performed by: EMERGENCY MEDICINE

## 2025-02-14 PROCEDURE — 82435 ASSAY OF BLOOD CHLORIDE: CPT | Performed by: EMERGENCY MEDICINE

## 2025-02-14 RX ORDER — DOXYCYCLINE 100 MG/1
100 CAPSULE ORAL 2 TIMES DAILY
Qty: 10 CAPSULE | Refills: 0 | Status: SHIPPED | OUTPATIENT
Start: 2025-02-14 | End: 2025-02-19

## 2025-02-14 RX ORDER — AMOXICILLIN AND CLAVULANATE POTASSIUM 400; 57 MG/5ML; MG/5ML
875 POWDER, FOR SUSPENSION ORAL 2 TIMES DAILY
Qty: 109.4 ML | Refills: 0 | Status: SHIPPED | OUTPATIENT
Start: 2025-02-14 | End: 2025-02-19

## 2025-02-14 RX ADMIN — SODIUM CHLORIDE, POTASSIUM CHLORIDE, SODIUM LACTATE AND CALCIUM CHLORIDE 1000 ML: 600; 310; 30; 20 INJECTION, SOLUTION INTRAVENOUS at 01:40

## 2025-02-14 RX ADMIN — SODIUM CHLORIDE, POTASSIUM CHLORIDE, SODIUM LACTATE AND CALCIUM CHLORIDE 1000 ML: 600; 310; 30; 20 INJECTION, SOLUTION INTRAVENOUS at 04:20

## 2025-02-14 ASSESSMENT — ACTIVITIES OF DAILY LIVING (ADL)
ADLS_ACUITY_SCORE: 63

## 2025-02-14 ASSESSMENT — COLUMBIA-SUICIDE SEVERITY RATING SCALE - C-SSRS: IS THE PATIENT NOT ABLE TO COMPLETE C-SSRS: UNABLE TO VERBALIZE

## 2025-02-14 NOTE — ED NOTES
Bed: ED21  Expected date:   Expected time:   Means of arrival:   Comments:  Kerri 64M possible sepsis, quadraplegic, eta 0040

## 2025-02-14 NOTE — PROGRESS NOTES
Keyon is a 64 year old who is being evaluated via a billable video visit.    How would you like to obtain your AVS? Mail a copy  If the video visit is dropped, the invitation should be resent by: Text to cell phone: [phone]  Will anyone else be joining your video visit?       Assessment & Plan     Flaccid hemiplegia affecting right dominant side, unspecified etiology (H)  Traumatic brain injury with loss of consciousness, sequela  Recurrent seizures (H)  Wheelchair dependence  See HPI. Signed powerwheel chair equipment order. Preferable chair w/standing component.       The longitudinal plan of care for the diagnosis(es)/condition(s) as documented were addressed during this visit. Due to the added complexity in care, I will continue to support Keyon in the subsequent management and with ongoing continuity of care.      Subjective   Keyon is a 64 year old, presenting for the following health issues:  Forms (Wheelchair documents)    Spoke with Keyon's mother Savannah in regards to need for a new power wheelchair.   Current chair not working properly. Interest in a powerchair with the ability to have a seat for him to stand up with while in the chair.     Questions for mobility assistance form:   He need assistance w/ bathing, toileting,dressing, moving.   Current mobility limitation secondary to TBI w/hemiplagia. Mobility limitation not solved by cane/walker or manual wheelchair. Power wheelchair will improve patient's mobility from room to room and compete ADLs more effectively. Will not need PT/OT eval for new power wheelchair unless requested by patient.             2/14/2025     1:27 PM   Additional Questions   Roomed by Mariano   Accompanied by Savannah Farias (GUARDIAN) (Mother)         2/14/2025   Forms   Any forms needing to be completed Yes     Video Start Time: 2:59 PM    History of Present Illness       Reason for visit:  Wheel chair documents    He eats 0-1 servings of fruits and vegetables daily.He  consumes 0 sweetened beverage(s) daily.He exercises with enough effort to increase his heart rate 9 or less minutes per day.  He exercises with enough effort to increase his heart rate 3 or less days per week.   He is taking medications regularly.           Review of Systems  Constitutional, neuro, ENT, endocrine, pulmonary, cardiac, gastrointestinal, genitourinary, musculoskeletal, integument and psychiatric systems are negative, except as otherwise noted.      Objective             Physical Exam   GENERAL: alert and no distress  EYES: Eyes grossly normal to inspection.  No discharge or erythema, or obvious scleral/conjunctival abnormalities.  RESP: No audible wheeze, cough, or visible cyanosis.    SKIN: Visible skin clear. No significant rash, abnormal pigmentation or lesions.  NEURO: Cranial nerves grossly intact.  Mentation and speech appropriate for age.  PSYCH: Appropriate affect, tone, and pace of words        Video-Visit Details    Type of service:  Video Visit   Video End Time: 3:10 PM   Originating Location (pt. Location): Home    Distant Location (provider location):  On-site  Platform used for Video Visit: Doximity    The likelihood of other entities in the differential is insufficient to justify any further testing for them at this time. This was explained to the patient. The patient was advised that persistent or worsening symptoms would require further evaluation. Patient advised to call the office and if unable to reach to go to the emergency room if they develop any new or worsening symptoms. Expressed understanding and agreement with above stated plan.     Signed Electronically by: Mark Causey PA-C

## 2025-02-14 NOTE — DISCHARGE INSTRUCTIONS
If you check his temperature, I would recommend that you check it rectally as this is more accurate.  Please follow-up in clinic.

## 2025-02-14 NOTE — ED PROVIDER NOTES
Emergency Department Note      History of Present Illness     Chief Complaint   Fever    HPI   Keyon Farias is a 64 year old male with a history of TBI, quadriplegia, hyperlipidemia, sepsis, seizures and cardiac arrest presenting with fever of 101 F. The patient's mother is concerned for sepsis. EMS reports a heart rate of 124 bpm. His blood pressure en route was 102/67 and his BG was 162. The patient was given Tylenol at home.    Independent Historian   EMS as detailed above.  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  DVT  GERD  Panhypopituitarism  Pneumonia  Seizures  Sepsis  Septic shock  Spastic hemiplegia  Thyroid disease  Tracheostomy care  Unspecific cerebral artery occlusion with cerebral infarction  UTI  Ventricular fibrillation  Ventricular tachyarrhythmia  Thrombocytopenia  Leukocytosis  Esophagitis  Dysphagia  Pancreatitis  Cardiac arrest  Hyperlipidemia  Bladder calculus  Hemiparesis  Panhypopituitarism  Atelectasis  Hyponatremia  Appendicitis     Medications   Albuterol  Cortef  Protonix  Briviact  Tegretol  Levothyroxine  Reglan  Depotestosterone     Surgical History   Endoscopic ultrasound of upper GI  Esophagogastroduodenoscopy x5  Head and neck surgery   Gastro jejunostomy tube change x25  PICC exchange left  Appendectomy  Insert G tube  Right hand repair  Tracheostomy  Vascular surgery     Physical Exam     Patient Vitals for the past 24 hrs:   BP Temp Temp src Pulse Resp SpO2   02/14/25 0700 113/73 -- -- 99 27 95 %   02/14/25 0600 110/70 -- -- 101 17 96 %   02/14/25 0500 (!) 105/99 -- -- 98 18 95 %   02/14/25 0400 115/61 98.7  F (37.1  C) Rectal 108 28 96 %   02/14/25 0300 114/80 -- -- 110 26 96 %   02/14/25 0215 105/74 -- -- 112 18 96 %   02/14/25 0200 -- -- -- 113 25 96 %   02/14/25 0145 -- -- -- 113 21 94 %   02/14/25 0053 104/70 --  -- -- -- --   02/14/25 0052 -- 99.6  F (37.6  C) Rectal (!) 123 22 93 %     Physical Exam  Head: No signs of trauma.   Mouth/Throat: Oropharynx is clear and dry  CV: Tachycardia  Resp: Effort normal and breath sounds normal. No respiratory distress.   GI: Soft. There is no apparent tenderness.  No rebound or guarding.  Normal bowel sounds.    MSK: No apparent trauma  Neuro: The patient is non-verbal and has hemiplegia  Skin: Skin is warm and dry. No rash noted.       Diagnostics     Lab Results   Labs Ordered and Resulted from Time of ED Arrival to Time of ED Departure   COMPREHENSIVE METABOLIC PANEL - Abnormal       Result Value    Sodium 139      Potassium 3.9      Carbon Dioxide (CO2) 28      Anion Gap 14      Urea Nitrogen 33.4 (*)     Creatinine 1.17      GFR Estimate 70      Calcium 9.0      Chloride 97 (*)     Glucose 151 (*)     Alkaline Phosphatase 103      AST 45      ALT 28      Protein Total 7.9      Albumin 3.8      Bilirubin Total 0.3     CBC WITH PLATELETS AND DIFFERENTIAL - Abnormal    WBC Count 14.7 (*)     RBC Count 4.93      Hemoglobin 13.3      Hematocrit 41.4      MCV 84      MCH 27.0      MCHC 32.1      RDW 15.2 (*)     Platelet Count 271      % Neutrophils 71      % Lymphocytes 14      % Monocytes 8      % Eosinophils 7      % Basophils 1      % Immature Granulocytes 0      NRBCs per 100 WBC 0      Absolute Neutrophils 10.4 (*)     Absolute Lymphocytes 2.0      Absolute Monocytes 1.2      Absolute Eosinophils 1.0 (*)     Absolute Basophils 0.1      Absolute Immature Granulocytes 0.0      Absolute NRBCs 0.0     LACTIC ACID WHOLE BLOOD WITH 1X REPEAT IN 2 HR WHEN >2 - Normal    Lactic Acid, Initial 1.7     ROUTINE UA WITH MICROSCOPIC REFLEX TO CULTURE - Normal    Color Urine Yellow      Appearance Urine Clear      Glucose Urine Negative      Bilirubin Urine Negative      Ketones Urine Negative      Specific Gravity Urine 1.024      Blood Urine Negative      pH Urine 7.0      Protein Albumin  Urine Negative      Urobilinogen Urine 2.0      Nitrite Urine Negative      Leukocyte Esterase Urine Negative      RBC Urine <1      WBC Urine <1     INFLUENZA A/B, RSV AND SARS-COV2 PCR - Normal    Influenza A PCR Negative      Influenza B PCR Negative      RSV PCR Negative      SARS CoV2 PCR Negative       Imaging   XR Chest 2 Views   Final Result   IMPRESSION: No acute abnormality.        Independent Interpretation   On my independent interpretation of CXR there is no pneumothorax      ED Course      Medications Administered   Medications   lactated ringers BOLUS 1,000 mL (0 mLs Intravenous Stopped 2/14/25 0332)   lactated ringers BOLUS 1,000 mL (0 mLs Intravenous Stopped 2/14/25 0521)     Procedures   Procedures     Discussion of Management   None    ED Course   ED Course as of 02/14/25 0807   Fri Feb 14, 2025 0044 I obtained the history and examined the patient as noted above.      0533 I rechecked and updated the patient.      0533 I called the patient's mother to provide an update.        Additional Documentation  None    Medical Decision Making / Diagnosis     ARSENIO Farias is a 64 year old male presents due to a reported fever.  Patient has history of TBI with aphasia and hemiplegia and history is limited from him.  I was able to speak with the patient's mother, who apparently had checked his temperature tonight axillary and had noted a fever so she sent him to the hospital.  Patient does have a history of aspiration pneumonia.  On arrival, the patient's rectal temperature was afebrile.  He was somewhat tachycardic, which he has had before and clinically he did appear somewhat dehydrated.  Blood work was obtained that did show an elevation of his white blood cell count, but was otherwise reassuring with a normal lactic acid.  Chest x-ray did not show any signs of infiltrate or aspiration, UA was normal, and viral panel was negative.  Patient was given IV fluids and his heart rate did improve.  He was  monitored for a number of hours and continued to be afebrile and appeared at his baseline.  At this time, I did not feel that admission to the hospital was necessary and on chart review it seems that he has done well with antibiotics through the G-tube to cover for an occult aspiration, and I felt that this was reasonable once again.  I did speak with the patient's mother and discussed the findings and plan.  I did recommend that she not take temperatures axillary, but rather recommended a rectal temperature in order to obtain a true core temperature if there is any concern.  She was given return precautions.      Disposition   The patient was discharged.     Diagnosis     ICD-10-CM    1. Fever, unspecified fever cause  R50.9       2. Dehydration  E86.0          Discharge Medications   New Prescriptions    AMOXICILLIN-CLAVULANATE (AUGMENTIN) 400-57 MG/5ML SUSPENSION    Place 10.94 mLs (875 mg) into Feeding Tube 2 times daily for 5 days.    DOXYCYCLINE HYCLATE (VIBRAMYCIN) 100 MG CAPSULE    Place 1 capsule (100 mg) into Feeding Tube 2 times daily for 5 days.     Scribe Disclosure:  Jossie DYKES, am serving as a scribe at 12:47 AM on 2/14/2025 to document services personally performed by Keyon Ying MD based on my observations and the provider's statements to me.        Keyon Ying MD  02/14/25 0854

## 2025-02-14 NOTE — ED TRIAGE NOTES
Pt BIBA from home for an axillary fever of 101F at home from his caregiver. Pt given tylenol at home. Pt tachycardic with soft BPS.     Triage Assessment (Adult)       Row Name 02/14/25 0053          Triage Assessment    Airway WDL WDL        Respiratory WDL    Respiratory WDL X;cough     Cough Frequency infrequent     Cough Type congested        Skin Circulation/Temperature WDL    Skin Circulation/Temperature WDL WDL        Cardiac WDL    Cardiac WDL X;rhythm     Pulse Rate & Regularity tachycardic        Peripheral/Neurovascular WDL    Peripheral Neurovascular WDL WDL        Cognitive/Neuro/Behavioral WDL    Cognitive/Neuro/Behavioral WDL X;speech;orientation     Level of Consciousness alert  nonverbal baseline     Arousal Level opens eyes spontaneously     Speech garbled;incoherent

## 2025-02-20 DIAGNOSIS — Z53.9 DIAGNOSIS NOT YET DEFINED: Primary | ICD-10-CM

## 2025-02-20 PROCEDURE — G0179 MD RECERTIFICATION HHA PT: HCPCS | Performed by: INTERNAL MEDICINE

## 2025-03-04 ENCOUNTER — LAB (OUTPATIENT)
Dept: LAB | Facility: CLINIC | Age: 65
End: 2025-03-04
Payer: MEDICARE

## 2025-03-04 ENCOUNTER — ALLIED HEALTH/NURSE VISIT (OUTPATIENT)
Dept: FAMILY MEDICINE | Facility: CLINIC | Age: 65
End: 2025-03-04
Payer: MEDICARE

## 2025-03-04 VITALS — TEMPERATURE: 95.3 F

## 2025-03-04 DIAGNOSIS — E03.9 HYPOTHYROIDISM, UNSPECIFIED TYPE: ICD-10-CM

## 2025-03-04 DIAGNOSIS — Z23 NEED FOR PROPHYLACTIC VACCINATION AND INOCULATION AGAINST INFLUENZA: ICD-10-CM

## 2025-03-04 DIAGNOSIS — Z23 NEED FOR VACCINATION: Primary | ICD-10-CM

## 2025-03-04 DIAGNOSIS — E87.1 HYPONATREMIA: ICD-10-CM

## 2025-03-04 LAB — SODIUM SERPL-SCNC: 136 MMOL/L (ref 135–145)

## 2025-03-04 PROCEDURE — 84295 ASSAY OF SERUM SODIUM: CPT

## 2025-03-04 PROCEDURE — 36415 COLL VENOUS BLD VENIPUNCTURE: CPT

## 2025-03-04 PROCEDURE — 84439 ASSAY OF FREE THYROXINE: CPT

## 2025-03-04 PROCEDURE — 90677 PCV20 VACCINE IM: CPT

## 2025-03-04 PROCEDURE — G0008 ADMIN INFLUENZA VIRUS VAC: HCPCS

## 2025-03-04 PROCEDURE — 84443 ASSAY THYROID STIM HORMONE: CPT

## 2025-03-04 PROCEDURE — G0009 ADMIN PNEUMOCOCCAL VACCINE: HCPCS

## 2025-03-04 PROCEDURE — 90673 RIV3 VACCINE NO PRESERV IM: CPT

## 2025-03-04 NOTE — PROGRESS NOTES
"Prior to immunization administration, verified patients identity using patient s name and date of birth. Please see Immunization Activity for additional information.     Screening Questionnaire for Adult Immunization    Are you sick today?   No   Do you have allergies to medications, food, a vaccine component or latex?   Yes   Have you ever had a serious reaction after receiving a vaccination?   No   Do you have a long-term health problem with heart, lung, kidney, or metabolic disease (e.g., diabetes), asthma, a blood disorder, no spleen, complement component deficiency, a cochlear implant, or a spinal fluid leak?  Are you on long-term aspirin therapy?   Yes (mom answered no)    Do you have cancer, leukemia, HIV/AIDS, or any other immune system problem?   No   Do you have a parent, brother, or sister with an immune system problem?   No   In the past 3 months, have you taken medications that affect  your immune system, such as prednisone, other steroids, or anticancer drugs; drugs for the treatment of rheumatoid arthritis, Crohn s disease, or psoriasis; or have you had radiation treatments?   No   Have you had a seizure, or a brain or other nervous system problem?   Yes   During the past year, have you received a transfusion of blood or blood    products, or been given immune (gamma) globulin or antiviral drug?   No   For women: Are you pregnant or is there a chance you could become       pregnant during the next month?   No   Have you received any vaccinations in the past 4 weeks?   No     Immunization questionnaire was positive for at least one answer.  Notified no one.    I have reviewed the following standing orders: Not Applicable; care gaps had Pneumo 20 as due \"high risk\" , Flu vac also showing as due.  Guardian thought  2 vaccines today enough,  declined Covid vac    Patient instructed to remain in clinic for 15 minutes afterwards, and to report any adverse reactions.     Screening performed by Kristi" YOLIE James on 3/4/2025 at 2:22 PM.

## 2025-03-05 ENCOUNTER — TELEPHONE (OUTPATIENT)
Dept: FAMILY MEDICINE | Facility: OTHER | Age: 65
End: 2025-03-05
Payer: MEDICARE

## 2025-03-05 LAB
T4 FREE SERPL-MCNC: 1.03 NG/DL (ref 0.9–1.7)
TSH SERPL DL<=0.005 MIU/L-ACNC: <0.01 UIU/ML (ref 0.3–4.2)

## 2025-03-05 NOTE — RESULT ENCOUNTER NOTE
Team - please call patient with results.  They are negative/normal, nothing else needs to be done at this time with these results unless there are questions or concerns    Thank you,    Ricky Hamilton MD

## 2025-03-05 NOTE — TELEPHONE ENCOUNTER
Attempted to reach patient/caregiver. C2C on file. Left message to return call. Please see NM message below.  Malka Wong MA    ----- Message from LORA HAMILTON sent at 3/5/2025  8:59 AM CST -----  Team - please call patient with results.  They are negative/normal, nothing else needs to be done at this time with these results unless there are questions or concerns    Thank you,    Ricky Hamilton MD

## 2025-03-06 NOTE — TELEPHONE ENCOUNTER
Called pt's mother from [facility] on file and relayed message to her, she verbalized understanding.  Joy Taylor, CMA

## 2025-03-27 ENCOUNTER — TELEPHONE (OUTPATIENT)
Dept: INTERVENTIONAL RADIOLOGY/VASCULAR | Facility: CLINIC | Age: 65
End: 2025-03-27
Payer: MEDICARE

## 2025-04-01 ENCOUNTER — APPOINTMENT (OUTPATIENT)
Dept: INTERVENTIONAL RADIOLOGY/VASCULAR | Facility: CLINIC | Age: 65
End: 2025-04-01
Attending: NURSE PRACTITIONER
Payer: MEDICARE

## 2025-04-01 ENCOUNTER — HOSPITAL ENCOUNTER (OUTPATIENT)
Facility: CLINIC | Age: 65
Discharge: HOME | End: 2025-04-01
Admitting: RADIOLOGY
Payer: MEDICARE

## 2025-04-01 VITALS
HEART RATE: 60 BPM | SYSTOLIC BLOOD PRESSURE: 111 MMHG | TEMPERATURE: 97.7 F | RESPIRATION RATE: 18 BRPM | OXYGEN SATURATION: 96 % | DIASTOLIC BLOOD PRESSURE: 62 MMHG

## 2025-04-01 DIAGNOSIS — R13.10 DYSPHAGIA, UNSPECIFIED TYPE: ICD-10-CM

## 2025-04-01 PROCEDURE — 999N000163 HC STATISTIC SIMPLE TUBE INSERTION/CHARGE, PORT, CATH, FISTULOGRAM

## 2025-04-01 PROCEDURE — 49452 REPLACE G-J TUBE PERC: CPT

## 2025-04-01 PROCEDURE — C1769 GUIDE WIRE: HCPCS

## 2025-04-01 ASSESSMENT — ACTIVITIES OF DAILY LIVING (ADL)
ADLS_ACUITY_SCORE: 63

## 2025-04-01 NOTE — PRE-PROCEDURE
GENERAL PRE-PROCEDURE:   Procedure:  Gastro jejunostomy tube exchange  Date/Time:  4/1/2025 11:19 AM    Written consent obtained?: Yes    Risks and benefits: Risks, benefits and alternatives were discussed    DC Plan: Appropriate discharge home plan in place for patients who are going home after procedure   Consent given by:  Guardian  Patient states understanding of procedure being performed: Yes    Procedure consent matches procedure scheduled: Yes    Appropriately NPO:  Yes    Mother Savannah guardian signed the consent.     Total time: 15 minutes    Thanks [facility] Interventional Radiology CNP ([phone]) (phone [phone])

## 2025-04-01 NOTE — DISCHARGE INSTRUCTIONS
G-tube Exchange Discharge Instructions     After you go home:    You may resume your normal diet    Care of Insertion Site:    For the first 48 hrs, check your puncture site every couple hours while you are awake   You may remove/change the dressing tomorrow  You may shower tomorrow  No tub baths, whirlpools or swimming until your puncture site has fully healed     Activity     You may go back to normal activity in 24 hours  Wait 48 hours before lifting, straining, exercise or other strenuous activity    Medicines:    You may resume all medications  Resume your Warfarin/Coumadin at your regular dose today. Follow up with your provider to have your INR rechecked  Resume your Platelet Inhibitors and Aspirin tomorrow at your regular dose  For minor pain, you may take Acetaminophen (Tylenol) or Ibuprofen (Advil)                 Call the provider who ordered this procedure if:    Blood or fluid is draining from the site  The site is red, swollen, hot, tender or there is foul-smelling drainage  Chills or a fever greater than 101 F (38 C)  Increased pain at the site  Any questions or concerns    Call  911 or go to the Emergency Room if:    Severe pain or trouble breathing  Bleeding that you cannot control      If you have questions call:        Inter[facility] Radiology Intake Center @ [phone]    Mon - Fri  7:30 am - 4 pm          Calls will be returned on the next business day  If you need immediate assistance - please be seen   in an Urgent Care or Emergency Department    You may also contact your provider via My Chart      Caring for your G-tube    Tube Maintenance:    For ENFit Tubes:    Do NOT over tighten the connections (the purple end & hub connection).    Clean the connecting ends (especially the hub) every day.    If you are unable to disconnect the tubing ends, soak the connection in warm water (or wrap in a wet warm washcloth) to try and loosen the connection.    Possible problems with your tube may  include:    Clogged with medications or feedings - most obstructions can be cleared with a small (3cc) syringe and warm water. This may be repeated until the tube is unclogged. This can be prevented by frequently flushing the tube with water (60cc) during the day and always after medications & feedings.    Tube pulls out or falls out -cover the opening with gauze & tape. Call [phone] for further instructions    Skin breakdown and/or yeast infections at the insertion site - use of skin barrier ointments and anti-fungal powders can treat most site irritations.  Ask your pharmacist or provider for assistance (a prescription is not necessary).    In general, tube problems (including pulled tubes) are NOT emergency situations. Unless the pulling out of a tube is accompanied by uncontrollable bleeding, please DO NOT GO TO THE EMERGENCY ROOM!  Call [phone] with problems.    Tube Care:    Change the gauze dressing every 24 hours and if soiled (dirty).  Stabilize all tubes securely by using gauze and tape.  Clean tube site with soap & water using a cotton applicator (Q-tip) as needed to prevent irritation.  Flush feeding tube with 60cc of warm or room temperature water before and after meds.  To prevent the tube from clogging, ask your provider or pharmacist if liquid forms of your medications are available. If not, crush the pills well & be sure to flush the tube before & after all medications.  Flush feeding tube a minimum of every 4 hours and when feeding is completed with 60cc of water to keep the tube clear and avoid clogging.  Pt may use an abdominal (waist) binder to protect the G-tube.    If there is continued oozing or bleeding, redness, yellow/green/foul smelling drainage    STOP the feedings & use of the tube immediately if there is:    Continued oozing or bleeding at the site  Redness  Yellow/green or foul smelling drainage at the site  Uncontrolled stomach pain    Many of the supplies mentioned above  can be purchased at your local pharmacy      For issues with your tube, please call:    [facility] Dept at [phone]

## 2025-04-01 NOTE — PROGRESS NOTES
Care Suites Discharge Summary    Discharge Criteria:   Discharge Criteria met per MD orders: Yes.   Vital signs stable.     Pt demonstrates ability to ambulate safely: Yes.  (See discharge questionnaire for additional information)    Discharge instructions & education:   Discharge instructions reviewed with mother. Patient verbalizes  understanding.   Additional patient education provided:  michael    Medications:   Patient will be discharging on new medications- No. Patient verbalizes reason for use, start date, and side effects NA.    Items returned to patient:   Home and hospital acquired medications returned to patient NA   Listed belongings gathered and returned to patient: Yes    Patient discharged to home.     Jerald Henao RN

## 2025-04-01 NOTE — IR NOTE
Procedure Note for IR Procedure Dictation  Fluoro time: 0.1 minutes  Total Fluoro Dose: 0.9 mGy (Air Kerma)  Contrast: 25 mL Omni  Local anesthetic: 0 mL 1% lidocaine  Conscious sedation: None

## 2025-04-01 NOTE — PROGRESS NOTES
Care Suites Admission Nursing Note    Patient Information  Name: Keyon Farias  Age: 64 year old  Reason for admission: gtube change  Care Suites arrival time: 1115    Visitor Information  Name: mother     Patient Admission/Assessment   Pre-procedure assessment complete: Yes  If abnormal assessment/labs, provider notified: N/A  NPO: Yes  Medications held per instructions/orders: Yes  Consent: obtained  If applicable, pregnancy test status: n/a  Patient oriented to room: Yes  Education/questions answered: Yes  Plan/other: proceed    Discharge Planning  Discharge name/phone number: mayra  Overnight post sedation caregiver: mother  Discharge location: home    Jerald Henao RN

## 2025-04-01 NOTE — IR NOTE
Interventional Radiology Intra-procedural Nursing Note    Patient Name: Keyon Farias  Medical Record Number: [id number]  Today's Date: April 1, 2025    Procedure: GJ tube exchange  Start time: 1144  End time: 1149  Report provided to: Jerald CHRISTINE  Patient depart time and location: 1155 to cs19    Note: Patient entered Interventional Radiology Suite number 1 via cart. Patient awake, alert and oriented. Assisted onto procedural table in supine position. Prepped and draped.  Dr. Villafana in room. Time out and procedure started.     Procedure well tolerated by patient without complications. Procedure end with debrief by Dr. Villafana.

## 2025-04-02 DIAGNOSIS — R13.10 DYSPHAGIA, UNSPECIFIED TYPE: Primary | ICD-10-CM

## 2025-04-03 DIAGNOSIS — E29.1 HYPOGONADISM MALE: ICD-10-CM

## 2025-04-03 DIAGNOSIS — E23.0 PANHYPOPITUITARISM: ICD-10-CM

## 2025-04-03 RX ORDER — TESTOSTERONE CYPIONATE 200 MG/ML
INJECTION, SOLUTION INTRAMUSCULAR
Qty: 4 ML | Refills: 0 | Status: SHIPPED | OUTPATIENT
Start: 2025-04-03

## 2025-04-07 ENCOUNTER — OFFICE VISIT (OUTPATIENT)
Dept: FAMILY MEDICINE | Facility: CLINIC | Age: 65
End: 2025-04-07
Payer: MEDICARE

## 2025-04-07 VITALS
TEMPERATURE: 97 F | BODY MASS INDEX: 22.9 KG/M2 | HEART RATE: 86 BPM | DIASTOLIC BLOOD PRESSURE: 73 MMHG | HEIGHT: 70 IN | OXYGEN SATURATION: 97 % | WEIGHT: 160 LBS | SYSTOLIC BLOOD PRESSURE: 109 MMHG | RESPIRATION RATE: 18 BRPM

## 2025-04-07 DIAGNOSIS — R13.10 DYSPHAGIA, UNSPECIFIED TYPE: ICD-10-CM

## 2025-04-07 DIAGNOSIS — Z99.3 WHEELCHAIR DEPENDENCE: Primary | ICD-10-CM

## 2025-04-07 DIAGNOSIS — J69.0 ASPIRATION PNEUMONIA OF BOTH LUNGS, UNSPECIFIED ASPIRATION PNEUMONIA TYPE, UNSPECIFIED PART OF LUNG (H): ICD-10-CM

## 2025-04-07 DIAGNOSIS — S06.9X9S TRAUMATIC BRAIN INJURY WITH LOSS OF CONSCIOUSNESS, SEQUELA: ICD-10-CM

## 2025-04-07 DIAGNOSIS — E78.5 HYPERLIPIDEMIA, UNSPECIFIED HYPERLIPIDEMIA TYPE: ICD-10-CM

## 2025-04-07 DIAGNOSIS — E29.1 HYPOGONADISM MALE: ICD-10-CM

## 2025-04-07 DIAGNOSIS — G81.01 FLACCID HEMIPLEGIA AFFECTING RIGHT DOMINANT SIDE, UNSPECIFIED ETIOLOGY (H): ICD-10-CM

## 2025-04-07 PROCEDURE — 3078F DIAST BP <80 MM HG: CPT | Performed by: NURSE PRACTITIONER

## 2025-04-07 PROCEDURE — 1126F AMNT PAIN NOTED NONE PRSNT: CPT | Performed by: NURSE PRACTITIONER

## 2025-04-07 PROCEDURE — 80061 LIPID PANEL: CPT | Performed by: NURSE PRACTITIONER

## 2025-04-07 PROCEDURE — 36415 COLL VENOUS BLD VENIPUNCTURE: CPT | Performed by: NURSE PRACTITIONER

## 2025-04-07 PROCEDURE — 99214 OFFICE O/P EST MOD 30 MIN: CPT | Performed by: NURSE PRACTITIONER

## 2025-04-07 PROCEDURE — 84403 ASSAY OF TOTAL TESTOSTERONE: CPT | Performed by: NURSE PRACTITIONER

## 2025-04-07 PROCEDURE — 3074F SYST BP LT 130 MM HG: CPT | Performed by: NURSE PRACTITIONER

## 2025-04-07 RX ORDER — CITALOPRAM HYDROBROMIDE 10 MG/1
10 TABLET ORAL DAILY
Qty: 90 TABLET | Refills: 3 | Status: SHIPPED | OUTPATIENT
Start: 2025-04-07

## 2025-04-07 ASSESSMENT — PATIENT HEALTH QUESTIONNAIRE - PHQ9
10. IF YOU CHECKED OFF ANY PROBLEMS, HOW DIFFICULT HAVE THESE PROBLEMS MADE IT FOR YOU TO DO YOUR WORK, TAKE CARE OF THINGS AT HOME, OR GET ALONG WITH OTHER PEOPLE: SOMEWHAT DIFFICULT
SUM OF ALL RESPONSES TO PHQ QUESTIONS 1-9: 4
SUM OF ALL RESPONSES TO PHQ QUESTIONS 1-9: 4

## 2025-04-07 ASSESSMENT — PAIN SCALES - GENERAL: PAINLEVEL_OUTOF10: NO PAIN (0)

## 2025-04-07 NOTE — PATIENT INSTRUCTIONS
Start the Celexa (citalopram). You can start with half pill for about 1-2 weeks then go to the full pill

## 2025-04-07 NOTE — PROGRESS NOTES
"  Assessment & Plan     Wheelchair dependence  Ordered new cushion for wheelchair.  They would also like a power chair.  Requests physical therapy eval for getting patient set up  - Pressure Cushion/Mapping Order; Future  - Electric Wheelchair Order; Future  - Physical Therapy  Referral; Future    Hypogonadism male  Due for labs as he is supplementing  - Testosterone, total; Future  - Testosterone, total    Dysphagia, unspecified type  Mother request speech eval as she would like for him to try eating again  - Home Care Referral    Traumatic brain injury with loss of consciousness, sequela  Has been more sad lately.  Had been on Celexa and is unclear why this was stopped.  We will restart again  - citalopram (CELEXA) 10 MG tablet; Take 1 tablet (10 mg) by mouth daily.  - Home Care Referral  - Physical Therapy  Referral; Future    Hyperlipidemia, unspecified hyperlipidemia type  Due for screening  - Lipid panel reflex to direct LDL Fasting; Future  - Lipid panel reflex to direct LDL Fasting          Minerva Ta is a 64 year old, presenting for the following health issues:  Recheck Medication and Referral (Physical therapy, wheelchair, new cushion for chair)        4/7/2025     1:59 PM   Additional Questions   Roomed by Irish TIJERINA MA     HPI      Here for lab check   Doing well   Needs eval for power chair with physical therapy and also a new cushion        Review of Systems  Detailed as above         Objective    /73 (BP Location: Left arm, Patient Position: Sitting, Cuff Size: Adult Regular)   Pulse 86   Temp 97  F (36.1  C) (Temporal)   Resp 18   Ht 1.778 m (5' 10\")   Wt 72.6 kg (160 lb)   SpO2 97%   BMI 22.96 kg/m    Body mass index is 22.96 kg/m .  Physical Exam   NAD  Sitting in wheelchair   Aphasic             Signed Electronically by: NATHANIEL Ruff CNP    "

## 2025-04-08 LAB
CHOLEST SERPL-MCNC: 129 MG/DL
FASTING STATUS PATIENT QL REPORTED: ABNORMAL
HDLC SERPL-MCNC: 32 MG/DL
LDLC SERPL CALC-MCNC: 73 MG/DL
NONHDLC SERPL-MCNC: 97 MG/DL
TRIGL SERPL-MCNC: 121 MG/DL

## 2025-04-09 ENCOUNTER — MEDICAL CORRESPONDENCE (OUTPATIENT)
Dept: HEALTH INFORMATION MANAGEMENT | Facility: CLINIC | Age: 65
End: 2025-04-09

## 2025-04-10 LAB — TESTOST SERPL-MCNC: 283 NG/DL (ref 240–950)

## 2025-04-18 ENCOUNTER — TELEPHONE (OUTPATIENT)
Dept: NURSING | Facility: CLINIC | Age: 65
End: 2025-04-18
Payer: MEDICARE

## 2025-04-18 DIAGNOSIS — G81.01 FLACCID HEMIPLEGIA AFFECTING RIGHT DOMINANT SIDE, UNSPECIFIED ETIOLOGY (H): Primary | ICD-10-CM

## 2025-04-18 NOTE — TELEPHONE ENCOUNTER
Mom, guardian, calling. He's sleeping a lot. Wants to talk with someone about his medications. Would they cause him to be sleepy? I recommended she talk with his pharmacist. They also need orders for:   gloves, condom catheters and leg bags for urine. Fax it to: [facility]: [phone].    Debi Carlson RN  [facility]

## 2025-04-22 RX ORDER — CATHETER MALE,EXTERNAL 41MM
1 EACH MISCELLANEOUS DAILY
Qty: 12 KIT | Refills: 3 | Status: SHIPPED | OUTPATIENT
Start: 2025-04-22

## 2025-04-23 NOTE — TELEPHONE ENCOUNTER
DME order faxed to [facility] at [phone].      Aislinn LUJAN MA on 4/23/2025 at 12:01 PM

## 2025-04-28 ENCOUNTER — TELEPHONE (OUTPATIENT)
Dept: INTERVENTIONAL RADIOLOGY/VASCULAR | Facility: CLINIC | Age: 65
End: 2025-04-28
Payer: MEDICARE

## 2025-04-28 ENCOUNTER — APPOINTMENT (OUTPATIENT)
Dept: INTERVENTIONAL RADIOLOGY/VASCULAR | Facility: CLINIC | Age: 65
End: 2025-04-28
Attending: NURSE PRACTITIONER
Payer: MEDICARE

## 2025-04-28 ENCOUNTER — HOSPITAL ENCOUNTER (OUTPATIENT)
Facility: CLINIC | Age: 65
Discharge: HOME | End: 2025-04-28
Admitting: STUDENT IN AN ORGANIZED HEALTH CARE EDUCATION/TRAINING PROGRAM
Payer: MEDICARE

## 2025-04-28 VITALS
TEMPERATURE: 97.8 F | BODY MASS INDEX: 22.9 KG/M2 | DIASTOLIC BLOOD PRESSURE: 67 MMHG | HEART RATE: 78 BPM | RESPIRATION RATE: 18 BRPM | HEIGHT: 70 IN | WEIGHT: 160 LBS | SYSTOLIC BLOOD PRESSURE: 105 MMHG | OXYGEN SATURATION: 97 %

## 2025-04-28 DIAGNOSIS — R13.10 DYSPHAGIA, UNSPECIFIED TYPE: ICD-10-CM

## 2025-04-28 PROCEDURE — 250N000009 HC RX 250: Performed by: STUDENT IN AN ORGANIZED HEALTH CARE EDUCATION/TRAINING PROGRAM

## 2025-04-28 PROCEDURE — 999N000163 HC STATISTIC SIMPLE TUBE INSERTION/CHARGE, PORT, CATH, FISTULOGRAM

## 2025-04-28 PROCEDURE — 49452 REPLACE G-J TUBE PERC: CPT

## 2025-04-28 PROCEDURE — C1769 GUIDE WIRE: HCPCS

## 2025-04-28 RX ORDER — LIDOCAINE HYDROCHLORIDE 20 MG/ML
JELLY TOPICAL ONCE
Status: COMPLETED | OUTPATIENT
Start: 2025-04-28 | End: 2025-04-28

## 2025-04-28 RX ADMIN — LIDOCAINE HYDROCHLORIDE 6 ML: 20 JELLY TOPICAL at 14:59

## 2025-04-28 ASSESSMENT — ACTIVITIES OF DAILY LIVING (ADL): ADLS_ACUITY_SCORE: 63

## 2025-04-28 NOTE — PROGRESS NOTES
Pt here for G tube re insert.    Tolerated well, Gtube site CDI with 2x2 gauze. No drainage.    Discharge to home with mother.  AVS given to mother.

## 2025-04-28 NOTE — IR NOTE
Procedure Note for IR Procedure Dictation  Procedure IR GJ Tube Change  Fluoro time: 1.4 minutes  Total Fluoro Dose: 5.01 mGy (Air Kerma)  Contrast: 25 mL Omnipaque 240  Local anesthetic: 6 mL lidocaine gel  Conscious sedation: None

## 2025-04-28 NOTE — PRE-PROCEDURE
GENERAL PRE-PROCEDURE:   Procedure:  Gastro jejunostomy tube exchange  Date/Time:  4/28/2025 2:33 PM    Written consent obtained?: Yes    Risks and benefits: Risks, benefits and alternatives were discussed    DC Plan: Appropriate discharge home plan in place for patients who are going home after procedure   Consent given by:  Guardian  Patient states understanding of procedure being performed: Yes    Patient's understanding of procedure matches consent: Yes    Procedure consent matches procedure scheduled: Yes      Tube came out, tip in to hold open, need replacing     Total time: 15 minutes    Thanks [facility] Interventional Radiology CNP ([phone]) (phone [phone])

## 2025-04-28 NOTE — TELEPHONE ENCOUNTER
Family called IR triage this morning to report that Keyon's GJ tube fell out last night. Tube has been reinserted to keep the tract open. IR 's notified to get Keyon in for an exchange.

## 2025-04-28 NOTE — IR NOTE
Interventional Radiology Intra-procedural Nursing Note    Patient Name: Keyon Farias  Medical Record Number: [id number]  Today's Date: April 28, 2025    Procedure consented for : G J tube change  Start time: 1503  End time: 1508  Report provided to: GALE RN  Patient depart time and location: 1515 to CS 8    Note: Patient entered Interventional Radiology Suite number 2 via cart. Patient awake, alert and oriented. Assisted onto procedural table in supine position. Prepped and draped.  Dr. Mckeon in room. Time out and procedure started. Vital signs stable. Procedure well tolerated by patient without complications. Barrier cream to insertion site    Administered medication totals:    Lidocaine 1% 6 mls topical .

## 2025-04-28 NOTE — DISCHARGE INSTRUCTIONS
G-tube Exchange Discharge Instructions     After you go home:    You may resume your normal diet    Care of Insertion Site:    For the first 48 hrs, check your puncture site every couple hours while you are awake   You may remove/change the dressing tomorrow  You may shower tomorrow  No tub baths, whirlpools or swimming until your puncture site has fully healed     Activity     You may go back to normal activity in 24 hours  Wait 48 hours before lifting, straining, exercise or other strenuous activity    Medicines:    You may resume all medications  Resume your Warfarin/Coumadin at your regular dose today. Follow up with your provider to have your INR rechecked  Resume your Platelet Inhibitors and Aspirin tomorrow at your regular dose  For minor pain, you may take Acetaminophen (Tylenol) or Ibuprofen (Advil)                 Call the provider who ordered this procedure if:    Blood or fluid is draining from the site  The site is red, swollen, hot, tender or there is foul-smelling drainage  Chills or a fever greater than 101 F (38 C)  Increased pain at the site  Any questions or concerns    Call  911 or go to the Emergency Room if:    Severe pain or trouble breathing  Bleeding that you cannot control      If you have questions call:        Interventional Radiology Intake Center @ [phone]    Mon - Fri  7:30 am - 4 pm          Calls will be returned on the next business day  If you need immediate assistance - please be seen   in an Urgent Care or Emergency Department    You may also contact your provider via My Chart      Caring for your G-tube    Tube Maintenance:    For ENFit Tubes:    Do NOT over tighten the connections (the purple end & hub connection).    Clean the connecting ends (especially the hub) every day.    If you are unable to disconnect the tubing ends, soak the connection in warm water (or wrap in a wet warm washcloth) to try and loosen the connection.    Possible problems with your tube may  include:    Clogged with medications or feedings - most obstructions can be cleared with a small (3cc) syringe and warm water. This may be repeated until the tube is unclogged. This can be prevented by frequently flushing the tube with water (60cc) during the day and always after medications & feedings.    Tube pulls out or falls out -cover the opening with gauze & tape. Call [phone] for further instructions    Skin breakdown and/or yeast infections at the insertion site - use of skin barrier ointments and anti-fungal powders can treat most site irritations.  Ask your pharmacist or provider for assistance (a prescription is not necessary).    In general, tube problems (including pulled tubes) are NOT emergency situations. Unless the pulling out of a tube is accompanied by uncontrollable bleeding, please DO NOT GO TO THE EMERGENCY ROOM!  Call [phone] with problems.    Tube Care:    Change the gauze dressing every 24 hours and if soiled (dirty).  Stabilize all tubes securely by using gauze and tape.  Clean tube site with soap & water using a cotton applicator (Q-tip) as needed to prevent irritation.  Flush feeding tube with 60cc of warm or room temperature water before and after meds.  To prevent the tube from clogging, ask your provider or pharmacist if liquid forms of your medications are available. If not, crush the pills well & be sure to flush the tube before & after all medications.  Flush feeding tube a minimum of every 4 hours and when feeding is completed with 60cc of water to keep the tube clear and avoid clogging.  Pt may use an abdominal (waist) binder to protect the G-tube.    If there is continued oozing or bleeding, redness, yellow/green/foul smelling drainage    STOP the feedings & use of the tube immediately if there is:    Continued oozing or bleeding at the site  Redness  Yellow/green or foul smelling drainage at the site  Uncontrolled stomach pain    Many of the supplies mentioned above  can be purchased at your local pharmacy      For issues with your tube, please call:    [facility]ventional Radiology Dept at [phone]

## 2025-04-29 DIAGNOSIS — Z93.4 GASTROJEJUNOSTOMY TUBE STATUS (H): Primary | ICD-10-CM

## 2025-05-15 ENCOUNTER — MEDICAL CORRESPONDENCE (OUTPATIENT)
Dept: HEALTH INFORMATION MANAGEMENT | Facility: CLINIC | Age: 65
End: 2025-05-15

## 2025-05-20 ENCOUNTER — THERAPY VISIT (OUTPATIENT)
Dept: OCCUPATIONAL THERAPY | Facility: CLINIC | Age: 65
End: 2025-05-20
Attending: NURSE PRACTITIONER
Payer: MEDICARE

## 2025-05-20 DIAGNOSIS — Z78.9 IMPAIRED MOBILITY AND ADLS: Primary | ICD-10-CM

## 2025-05-20 DIAGNOSIS — Z99.3 WHEELCHAIR DEPENDENCE: ICD-10-CM

## 2025-05-20 DIAGNOSIS — S06.9X9S TRAUMATIC BRAIN INJURY WITH LOSS OF CONSCIOUSNESS, SEQUELA: ICD-10-CM

## 2025-05-20 DIAGNOSIS — Z74.09 IMPAIRED MOBILITY AND ADLS: Primary | ICD-10-CM

## 2025-05-20 PROCEDURE — 97542 WHEELCHAIR MNGMENT TRAINING: CPT | Mod: GO | Performed by: OCCUPATIONAL THERAPIST
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
## 2025-05-21 NOTE — PHARMACY-ADMISSION MEDICATION HISTORY
Informed pt that no sleep apnea was noted on her sleep study.  Encouraged pt to try to not sleep on her back and follow up with Dr. Monroy next month.  Pt verbalized understanding.   Pharmacy Medication History  Admission medication history interview status for the 10/28/2021  admission is complete. See EPIC admission navigator for prior to admission medications     Location of Interview: Patient room  Medication history sources: Patient's family/friend (mother) and Surescripts    Significant changes made to the medication list:  1. No changes made   2. Azithromycin and cefpodoxime course completed in earlier this month.    In the past week, patient estimated taking medication this percent of the time: greater than 90%    Medication reconciliation completed by provider prior to medication history? No    Time spent in this activity: 20 minutes    Prior to Admission medications    Medication Sig Last Dose Taking? Auth Provider   acetaminophen (TYLENOL 8 HOUR) 650 MG CR tablet Take 650 mg by mouth every 8 hours as needed for mild pain or fever  Yes Unknown, Entered By History   acetylcysteine (MUCOMYST) 20 % neb solution Take 2 mLs by nebulization 4 times daily With albuterol at 0700, 1100, 1500, and 1900  10/28/2021 at am Yes Unknown, Entered By History   albuterol (PROVENTIL) (5 MG/ML) 0.5% neb solution Take 2.5 mg by nebulization every 4 hours (while awake) 0700 1100 1500 1900 with mucomyst  10/28/2021 at am Yes Unknown, Entered By History   aspirin (ASA) 81 MG chewable tablet 81 mg by Oral or Feeding Tube route daily At 0900 10/28/2021 at Unknown time Yes Unknown, Entered By History   bacitracin ointment Apply topically daily as needed for wound care To PEG site.   Yes Unknown, Entered By History   Brivaracetam (BRIVIACT) 10 MG/ML solution 100 mg by Oral or Feeding Tube route 2 times daily 0900, 2100 10/28/2021 at am Yes Unknown, Entered By History   carBAMazepine (TEGRETOL) 100 MG/5ML suspension Take 100 mg by mouth daily Take at 1800  10/27/2021 at Unknown time Yes Unknown, Entered By History   carBAMazepine (TEGRETOL) 100 MG/5ML suspension 150 mg by Oral or Feeding Tube route 3 times daily  At 06:00, 12:00, and 24:00 for seizures 10/28/2021 at amtime Yes Unknown, Entered By History   clotrimazole-betamethasone (LOTRISONE) 1-0.05 % external cream Apply topically 2 times daily as needed   Yes Leticia Santiago MD   Guar Gum (FIBER MODULAR, NUTRISOURCE FIBER,) packet 1 packet by Per Feeding Tube route daily 10/28/2021 at Unknown time Yes Unknown, Entered By History   hydrocortisone (CORTEF) 5 MG tablet Take 15 mg (3 tablets) in the morning and 5 mg (1 tablet)  at 2:00 PM. During illness patient takes more as a stress dose. Please increase the dose as directed. 10/28/2021 at am Yes Faizan Mclean MD   hydrocortisone 1 % CREA cream Place rectally 2 times daily as needed for other Apply to reddened memo areas as needed  Yes Unknown, Entered By History   hydrocortisone sodium succinate PF (SOLU-CORTEF) 100 MG injection Inject 1 mL (50 mg) into the muscle once as needed (If unable to keep oral hydrocortisone due to vomiting.) Dispense Act-O-Vial  Yes Faizan Mclean MD   levothyroxine (SYNTHROID/LEVOTHROID) 150 MCG tablet Take 1 tablet (150 mcg) by mouth daily 10/28/2021 at am Yes Faizan Mclean MD   metoclopramide (REGLAN) 10 MG/10ML SOLN solution Take 10 mg by mouth 4 times daily (before meals and nightly) 0800, 1200, 1600, 2000  Disconnects bag before administration, then waits 45 mins before reconnecting after giving the medication 10/28/2021 at am Yes Unknown, Entered By History   miconazole (MICATIN) 2 % AERP powder Apply topically 2 times daily as needed   Yes Unknown, Entered By History   multivitamin, therapeutic (THERA-VIT) TABS tablet Take 1 tablet by mouth daily 10/28/2021 at Unknown time Yes Reported, Patient   mupirocin (BACTROBAN) 2 % external ointment Apply topically 2 times daily as needed   Yes Reported, Patient   pantoprazole (PROTONIX) 2 mg/mL SUSP suspension 20 mLs (40 mg) by Per J Tube route daily 10/28/2021 at am Yes Alvaro Barahona MD   potassium & sodium phosphates  (NEUTRA-PHOS) 280-160-250 MG Packet Take 1 packet by mouth 3 times daily  10/28/2021 at am Yes Yanely Liriano MD   prochlorperazine (COMPAZINE) 25 MG suppository Place 1 suppository (25 mg) rectally every 12 hours as needed for nausea or vomiting  Yes Alvaro Barahona MD   Scopolamine HBr POWD Dispense #90. Mix contents with small amount of water for admin via J-tube.  Administer 0.8 mg three times each day.  Patient taking differently: Dispense #90. Mix contents with small amount of water for admin via J-tube.  Administer 0.8 mg three times each day as needed.  Yes Jennie Bermudez MD   Skin Protectants, Misc. (BALMEX SKIN PROTECTANT) OINT Externally apply topically 2 times daily as needed (irritation) Applay to reddened memo areas twice daily as needed  Yes Unknown, Entered By History   sodium bicarbonate 650 MG tablet Take 1 tablet (650 mg) by mouth daily 10/28/2021 at am Yes Ricky Torres MD   testosterone cypionate (DEPOTESTOSTERONE) 200 MG/ML injection Inject 0.3 mLs (60 mg) into the muscle every 7 days 10/22/2021 Yes Faizan Mclean MD   vitamin C (ASCORBIC ACID) 1000 MG TABS 1,000 mg by Oral or Feeding Tube route daily  10/28/2021 at am Yes Unknown, Entered By History   vitamin D3 (CHOLECALCIFEROL) 2000 units (50 mcg) tablet Take 2,000 Units by mouth daily Crush and feed via j-tube @@ 0900 10/28/2021 at am Yes Unknown, Entered By History   calcium carbonate 1250 MG/5ML SUSP suspension Take 1,250 mg by mouth 3 times daily 0900, 1500, 2100   Unknown, Entered By History     The information provided in this note is only as accurate as the sources available at the time of update(s)     Ned AndersonD, PGY-1 Resident

## 2025-05-22 NOTE — PLAN OF CARE
Discharge    Patient discharged to home via HE stretcher with home medications and belongings  Care plan note: Vss on RA. Pt. Alert, nonverbal orientation HECTOR. T&Rq2hrs. Ax2, total care, up with lift. G-tube clamped. Incontinent of B&B.     Listed belongings gathered and returned to patient. Yes  Care Plan and Patient education resolved: Yes  Prescriptions if needed, hard copies sent with patient  NA  Home and hospital acquired medications returned to patient: Yes  Medication Bin checked and emptied on discharge Yes  Follow up appointment made for patient: Yes    BR/WA SPX N/A 11/21/2018    DIAGNOSTIC LAPAROSCOPY, LYSIS OF ADHESIONS performed by Job Campos MD at [facility] OR    RENE-EN-Y GASTRIC BYPASS  06/14/2005    Dr. Campos     SHOULDER ARTHROSCOPY Right 01/02/2015    rotator cuff repair, subacromial decompression, debridement    SHOULDER SURGERY  1999    left    TONSILLECTOMY  1959    UPPER GASTROINTESTINAL ENDOSCOPY  10/2004    UPPER GASTROINTESTINAL ENDOSCOPY  04/18/2012    [facility]    UPPER GASTROINTESTINAL ENDOSCOPY N/A 11/20/2018    EGD BIOPSY performed by Karl Encinas MD at [facility] ENDOSCOPY    UPPER GASTROINTESTINAL ENDOSCOPY N/A 01/02/2019    EGD ESOPHAGOGASTRODUODENOSCOPY performed by Job Campos MD at [facility] ENDOSCOPY    WRIST ARTHROSCOPY Right 12/02/2015    DEBRIDEMENT ASPIRATION; RIGHT DEQUARVAINES RELEASE       Social History:    Social History     Tobacco Use    Smoking status: Every Day     Current packs/day: 0.25     Average packs/day: 0.3 packs/day for 35.4 years (8.8 ttl pk-yrs)     Types: Cigarettes     Start date: 1990     Passive exposure: Current    Smokeless tobacco: Never   Substance Use Topics    Alcohol use: Yes     Comment: Occasionally                                Ready to quit: Not Answered  Counseling given: Not Answered      Vital Signs (Current):   Vitals:    05/16/25 1009   Weight: 94.3 kg (208 lb)   Height: 1.524 m (5')                                              BP Readings from Last 3 Encounters:   05/13/25 120/72   04/01/25 121/81   03/25/25 120/70       NPO Status:  >8.H                                                                               BMI:   Wt Readings from Last 3 Encounters:   05/13/25 94.3 kg (208 lb)   04/28/25 90.7 kg (200 lb)   04/14/25 90.7 kg (200 lb)     Body mass index is 40.62 kg/m².    CBC:   Lab Results   Component Value Date/Time    WBC 10.1 05/16/2025 02:03 PM    RBC 4.47 05/16/2025 02:03 PM    HGB 13.6 05/16/2025 02:03 PM    HCT 42.9 05/16/2025 02:03 PM    MCV 96.0 05/16/2025 02:03 PM    RDW 13.8

## 2025-05-28 ENCOUNTER — MEDICAL CORRESPONDENCE (OUTPATIENT)
Dept: HEALTH INFORMATION MANAGEMENT | Facility: CLINIC | Age: 65
End: 2025-05-28

## 2025-06-12 ENCOUNTER — MEDICAL CORRESPONDENCE (OUTPATIENT)
Dept: HEALTH INFORMATION MANAGEMENT | Facility: CLINIC | Age: 65
End: 2025-06-12

## 2025-07-18 ENCOUNTER — TELEPHONE (OUTPATIENT)
Dept: FAMILY MEDICINE | Facility: CLINIC | Age: 65
End: 2025-07-18

## 2025-07-18 PROCEDURE — 99207 PR NON-BILLABLE SERV PER CHARTING: CPT | Performed by: PHYSICIAN ASSISTANT

## 2025-07-18 NOTE — TELEPHONE ENCOUNTER
Patient mom called (c2c on file) stating patient had a Pre-op today and was told to call back with surgeons name and fax number.     Surgeon's name is Joy Lockett   Fax # is [phone].     She then wanted to know why on the AVS it show's that patient has CKD stage 1. She stated no one has ever told them this and is wondering if she should be worried.     From problem list.       Please advise.     Carmen Cheatham RN

## 2025-07-22 NOTE — TELEPHONE ENCOUNTER
Spoke with patient's mother, Savannah.  Confirmed that preop was faxed to number provided.     H/o CKD stage 1, last renal function normal. Reviewed.         Karen Willams PA-C  Same Day Provider  [facility]

## 2025-08-11 DIAGNOSIS — K21.9 GASTROESOPHAGEAL REFLUX DISEASE, UNSPECIFIED WHETHER ESOPHAGITIS PRESENT: ICD-10-CM

## 2025-08-11 DIAGNOSIS — R68.89 EXCESSIVE ORAL SECRETIONS: ICD-10-CM

## 2025-08-13 RX ORDER — GLYCOPYRROLATE 1 MG/5ML
2 SOLUTION ORAL 2 TIMES DAILY
Qty: 600 ML | Refills: 0 | Status: SHIPPED | OUTPATIENT
Start: 2025-08-13

## 2025-08-29 DIAGNOSIS — H10.32 ACUTE CONJUNCTIVITIS OF LEFT EYE, UNSPECIFIED ACUTE CONJUNCTIVITIS TYPE: ICD-10-CM

## 2025-08-29 DIAGNOSIS — E03.9 HYPOTHYROIDISM, UNSPECIFIED TYPE: ICD-10-CM

## 2025-09-02 ENCOUNTER — PATIENT OUTREACH (OUTPATIENT)
Dept: CARE COORDINATION | Facility: CLINIC | Age: 65
End: 2025-09-02
Payer: MEDICARE

## 2025-09-02 ENCOUNTER — TELEPHONE (OUTPATIENT)
Dept: FAMILY MEDICINE | Facility: CLINIC | Age: 65
End: 2025-09-02
Payer: MEDICARE

## 2025-09-02 DIAGNOSIS — S06.9XAA TBI (TRAUMATIC BRAIN INJURY) (H): Primary | ICD-10-CM

## 2025-09-02 RX ORDER — LEVOTHYROXINE SODIUM 88 UG/1
88 TABLET ORAL DAILY
Qty: 90 TABLET | Refills: 1 | Status: SHIPPED | OUTPATIENT
Start: 2025-09-02

## 2025-09-02 RX ORDER — POLYMYXIN B SULFATE AND TRIMETHOPRIM 1; 10000 MG/ML; [USP'U]/ML
1-2 SOLUTION OPHTHALMIC EVERY 6 HOURS
Qty: 10 ML | Refills: 0 | Status: SHIPPED | OUTPATIENT
Start: 2025-09-02 | End: 2025-09-12

## 2025-09-03 ENCOUNTER — PATIENT OUTREACH (OUTPATIENT)
Dept: NURSING | Facility: CLINIC | Age: 65
End: 2025-09-03
Payer: MEDICARE

## 2025-09-03 ASSESSMENT — ACTIVITIES OF DAILY LIVING (ADL)
DEPENDENT_IADLS:: CLEANING;COOKING;LAUNDRY;SHOPPING;MEAL PREPARATION;MEDICATION MANAGEMENT;MONEY MANAGEMENT;TRANSPORTATION;INCONTINENCE

## (undated) DEVICE — DRAPE SHEET MED 44X70" 9355

## (undated) DEVICE — PAD CHUX UNDERPAD 30X30"

## (undated) DEVICE — ENDO TUBING CO2 SMARTCAP STERILE DISP 100145CO2EXT

## (undated) DEVICE — CATH BALLOON ELATION ESOPH/PYLORIC 6-7-8MMX180CM EPB6

## (undated) DEVICE — CUP AND LID 2PK 2OZ STERILE  SSK9006A

## (undated) DEVICE — SOL WATER IRRIG 1000ML BOTTLE 2F7114

## (undated) DEVICE — KIT ENDO FIRST STEP DISINFECTANT 200ML W/POUCH EP-4

## (undated) DEVICE — ENDO CONNECTOR ENDOGATOR AUX WATER JET FOR OLYMPUS SCOPE

## (undated) DEVICE — ENDO CAP AND TUBING STERILE FOR ENDOGATOR  100130

## (undated) DEVICE — INFLATION DEVICE BIG 60 ENDO-AN6012

## (undated) DEVICE — CATH BALLOON ELATION ESOPH/PYLORIC 12-13.5-15MMX180CM EPB12

## (undated) DEVICE — SPECIMEN CONTAINER 3OZ W/FORMALIN 59901

## (undated) DEVICE — ENDO TUBING INSUFFLATOR CO2 EFFICIENT 710324

## (undated) DEVICE — ESU GROUND PAD ADULT W/CORD E7507

## (undated) DEVICE — SUCTION MANIFOLD DORNOCH ULTRA CART UL-CL500

## (undated) DEVICE — SYR 30ML SLIP TIP W/O NDL 302833

## (undated) DEVICE — ENDO CATH BALLOON EXTRACTOR PRO RX 09-12MM 4700

## (undated) DEVICE — WIRE GUIDE 0.025"X450CM STR VISIGLIDE G-240-2545S

## (undated) DEVICE — SPECIMEN TRAP MUCOUS 40ML LUKI C30200A

## (undated) DEVICE — PACK ENDOSCOPY GI CUSTOM UMMC

## (undated) DEVICE — ENDO FUSION OMNI-TOME G31903

## (undated) DEVICE — CYSTOTOME 10FRX165CM

## (undated) DEVICE — ENDO BITE BLOCK ADULT OMNI-BLOC

## (undated) DEVICE — WIRE GUIDE 0.025"X270CM STR VISIGLIDE G-240-2527S

## (undated) DEVICE — TAPE DURAPORE 3" SILK 1538-3

## (undated) DEVICE — ENDO NDL ASPIRATION 19GA FLEX SLIMLINE EXPECT EUS M00555530

## (undated) RX ORDER — LIDOCAINE HYDROCHLORIDE 20 MG/ML
JELLY TOPICAL
Status: DISPENSED
Start: 2022-03-18

## (undated) RX ORDER — SODIUM CHLORIDE, SODIUM LACTATE, POTASSIUM CHLORIDE, CALCIUM CHLORIDE 600; 310; 30; 20 MG/100ML; MG/100ML; MG/100ML; MG/100ML
INJECTION, SOLUTION INTRAVENOUS
Status: DISPENSED
Start: 2017-02-07

## (undated) RX ORDER — LIDOCAINE HYDROCHLORIDE 20 MG/ML
JELLY TOPICAL
Status: DISPENSED
Start: 2022-11-25

## (undated) RX ORDER — LIDOCAINE HYDROCHLORIDE 20 MG/ML
JELLY TOPICAL
Status: DISPENSED
Start: 2024-05-01

## (undated) RX ORDER — LIDOCAINE HYDROCHLORIDE 20 MG/ML
JELLY TOPICAL
Status: DISPENSED
Start: 2024-10-23

## (undated) RX ORDER — LIDOCAINE HYDROCHLORIDE 20 MG/ML
JELLY TOPICAL
Status: DISPENSED
Start: 2024-12-18

## (undated) RX ORDER — LIDOCAINE HYDROCHLORIDE 20 MG/ML
JELLY TOPICAL
Status: DISPENSED
Start: 2025-04-01

## (undated) RX ORDER — LIDOCAINE HYDROCHLORIDE 20 MG/ML
JELLY TOPICAL
Status: DISPENSED
Start: 2023-11-07

## (undated) RX ORDER — LIDOCAINE HYDROCHLORIDE 10 MG/ML
INJECTION, SOLUTION INFILTRATION; PERINEURAL
Status: DISPENSED
Start: 2022-03-18

## (undated) RX ORDER — LIDOCAINE HYDROCHLORIDE 20 MG/ML
JELLY TOPICAL
Status: DISPENSED
Start: 2023-05-01

## (undated) RX ORDER — LIDOCAINE HYDROCHLORIDE 20 MG/ML
JELLY TOPICAL
Status: DISPENSED
Start: 2025-04-28